# Patient Record
Sex: MALE | Race: WHITE | NOT HISPANIC OR LATINO | Employment: OTHER | URBAN - METROPOLITAN AREA
[De-identification: names, ages, dates, MRNs, and addresses within clinical notes are randomized per-mention and may not be internally consistent; named-entity substitution may affect disease eponyms.]

---

## 2017-02-20 ENCOUNTER — OFFICE VISIT (OUTPATIENT)
Dept: FAMILY MEDICINE CLINIC | Facility: CLINIC | Age: 62
End: 2017-02-20

## 2017-02-20 VITALS
HEART RATE: 73 BPM | SYSTOLIC BLOOD PRESSURE: 133 MMHG | RESPIRATION RATE: 16 BRPM | WEIGHT: 204 LBS | TEMPERATURE: 97.6 F | HEIGHT: 72 IN | DIASTOLIC BLOOD PRESSURE: 77 MMHG | BODY MASS INDEX: 27.63 KG/M2

## 2017-02-20 DIAGNOSIS — F32.5 MAJOR DEPRESSIVE DISORDER WITH SINGLE EPISODE, IN FULL REMISSION (HCC): ICD-10-CM

## 2017-02-20 DIAGNOSIS — E55.9 VITAMIN D DEFICIENCY: ICD-10-CM

## 2017-02-20 DIAGNOSIS — Z79.4 TYPE 2 DIABETES MELLITUS WITH MODERATE NONPROLIFERATIVE RETINOPATHY AND MACULAR EDEMA, WITH LONG-TERM CURRENT USE OF INSULIN, UNSPECIFIED LATERALITY (HCC): ICD-10-CM

## 2017-02-20 DIAGNOSIS — I10 ESSENTIAL HYPERTENSION: Primary | ICD-10-CM

## 2017-02-20 DIAGNOSIS — E78.49 OTHER HYPERLIPIDEMIA: ICD-10-CM

## 2017-02-20 DIAGNOSIS — E11.3319 TYPE 2 DIABETES MELLITUS WITH MODERATE NONPROLIFERATIVE RETINOPATHY AND MACULAR EDEMA, WITH LONG-TERM CURRENT USE OF INSULIN, UNSPECIFIED LATERALITY (HCC): ICD-10-CM

## 2017-02-20 PROCEDURE — 99214 OFFICE O/P EST MOD 30 MIN: CPT | Performed by: FAMILY MEDICINE

## 2017-02-20 RX ORDER — EZETIMIBE 10 MG/1
10 TABLET ORAL NIGHTLY
Qty: 30 TABLET | Refills: 5 | Status: SHIPPED | OUTPATIENT
Start: 2017-02-20 | End: 2017-08-21 | Stop reason: SDUPTHER

## 2017-02-20 RX ORDER — VALSARTAN 320 MG/1
320 TABLET ORAL DAILY
Qty: 30 TABLET | Refills: 5 | Status: SHIPPED | OUTPATIENT
Start: 2017-02-20 | End: 2017-08-21 | Stop reason: SDUPTHER

## 2017-02-20 RX ORDER — BUPROPION HYDROCHLORIDE 150 MG/1
150 TABLET ORAL DAILY
Qty: 30 TABLET | Refills: 5 | Status: SHIPPED | OUTPATIENT
Start: 2017-02-20 | End: 2017-08-21 | Stop reason: SDUPTHER

## 2017-02-20 RX ORDER — ATORVASTATIN CALCIUM 40 MG/1
40 TABLET, FILM COATED ORAL DAILY
Qty: 30 TABLET | Refills: 11 | Status: SHIPPED | OUTPATIENT
Start: 2017-02-20 | End: 2017-03-28

## 2017-02-20 NOTE — PROGRESS NOTES
"Chief Complaint   Patient presents with   • Hypertension   • Hyperlipidemia       Subjective   This patient returns the office for medicine refill.  His blood pressure is controlled on current therapy.  Side effect of constipation noted with irbesartan.  For that reason, we will switch to valsartan.  Depression and stress are not fully controlled with  bupropion.  Due to several home situations we will get referral to psychology.  He is overdue for seeing endocrinology and we will obtain labs and get a referral for that condition.  Labs are due.  Review of Systems   Constitutional: Positive for fatigue (exhausted).   Cardiovascular: Negative for chest pain.       Objective   Visit Vitals   • /77   • Pulse 73   • Temp 97.6 °F (36.4 °C) (Oral)   • Resp 16   • Ht 72\" (182.9 cm)   • Wt 204 lb (92.5 kg)   • BMI 27.67 kg/m2     Body mass index is 27.67 kg/(m^2).  Physical Exam   Constitutional: He is cooperative. No distress.   Eyes: Conjunctivae and lids are normal.   Neck: Carotid bruit is not present. No tracheal deviation present.   Cardiovascular: Normal rate, regular rhythm and normal heart sounds.    No murmur heard.  Pulmonary/Chest: Effort normal and breath sounds normal.   Neurological: He is alert. He is not disoriented.   Skin: Skin is warm and dry.   Psychiatric: He has a normal mood and affect. His speech is normal and behavior is normal. He expresses no suicidal ideation. He expresses no suicidal plans.   [facility]   Vitals reviewed.      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     • atorvastatin (LIPITOR) 40 MG tablet Take 1 tablet by mouth Daily. 30 tablet 11   • buPROPion XL (WELLBUTRIN XL) 150 MG 24 hr tablet Take 1 tablet by mouth Daily for 180 days. 30 tablet 5   • docusate sodium (COLACE) 100 MG capsule Take 1 capsule by mouth 2 (two) times a day. 30 capsule 0   • ezetimibe (ZETIA) 10 MG tablet Take 1 tablet by mouth Every Night for 180 days. 30 tablet 5   • insulin aspart (NOVOLOG FLEXPEN) 100 UNIT/ML solution pen-injector sc pen 15 units ac meal tid 15 mL 5   • insulin detemir (LEVEMIR FLEXPEN) 100 UNIT/ML injection 40 units once daily 15 mL 5   • JANUMET XR  MG tablet sustained-release 24 hour Take 2 tablets by mouth daily with dinner.     • RELION GLUCOSE TEST STRIPS test strip 1 each by Other route 2 (two) times a day. Use as instructed     • vitamin D (ERGOCALCIFEROL) 93491 UNITS capsule capsule Take 1 cap twice weekly 24 capsule 1   • [DISCONTINUED] atorvastatin (LIPITOR) 40 MG tablet Take 1 tablet by mouth daily. 30 tablet 11   • [DISCONTINUED] buPROPion XL (WELLBUTRIN XL) 150 MG 24 hr tablet Take 1 tablet by mouth Daily for 180 days. 30 tablet 5   • [DISCONTINUED] ezetimibe (ZETIA) 10 MG tablet Take 1 tablet by mouth Every Night for 180 days. 30 tablet 5   •  [DISCONTINUED] irbesartan (AVAPRO) 300 MG tablet Take 1 tablet by mouth Every Night for 180 days. 30 tablet 5   • valsartan (DIOVAN) 320 MG tablet Take 1 tablet by mouth Daily for 180 days. 30 tablet 5     No facility-administered encounter medications on file as of 2/20/2017.        Orders Placed This Encounter   Procedures   • Comprehensive metabolic panel   • Lipid Panel With LDL/HDL Ratio   • TSH   • Hemoglobin A1c   • MicroAlbumin, Urine, Random   • Vitamin D 25 Hydroxy   • Ambulatory Referral to Endocrinology     Referral Priority:   Routine     Referral Type:   Consultation     Referral Reason:   Specialty Services Required     Referred to Provider:   Corazon Dubois MD     Requested Specialty:   Endocrinology     Number of Visits Requested:   1   • Ambulatory Referral to Psychology     Referral Priority:   Routine     Referral Type:   Behavorial Health/Psych     Referral Reason:   Specialty Services Required     Requested Specialty:   Psychology     Number of Visits Requested:   1       Continue with current treatment plan.         F/U in 6 months

## 2017-03-23 ENCOUNTER — OFFICE VISIT (OUTPATIENT)
Dept: ENDOCRINOLOGY | Age: 62
End: 2017-03-23

## 2017-03-23 VITALS
WEIGHT: 201.4 LBS | DIASTOLIC BLOOD PRESSURE: 70 MMHG | SYSTOLIC BLOOD PRESSURE: 120 MMHG | HEIGHT: 72 IN | BODY MASS INDEX: 27.28 KG/M2

## 2017-03-23 DIAGNOSIS — N52.1 ERECTILE DYSFUNCTION DUE TO DISEASES CLASSIFIED ELSEWHERE: ICD-10-CM

## 2017-03-23 DIAGNOSIS — E78.5 HYPERLIPIDEMIA, UNSPECIFIED HYPERLIPIDEMIA TYPE: ICD-10-CM

## 2017-03-23 DIAGNOSIS — N18.9 CHRONIC KIDNEY DISEASE, UNSPECIFIED STAGE: ICD-10-CM

## 2017-03-23 DIAGNOSIS — E55.9 VITAMIN D DEFICIENCY: ICD-10-CM

## 2017-03-23 DIAGNOSIS — IMO0001 UNCONTROLLED DIABETES MELLITUS TYPE 2 WITHOUT COMPLICATIONS, UNSPECIFIED LONG TERM INSULIN USE STATUS: Primary | ICD-10-CM

## 2017-03-23 DIAGNOSIS — R53.82 CHRONIC FATIGUE: ICD-10-CM

## 2017-03-23 DIAGNOSIS — I10 ESSENTIAL HYPERTENSION: ICD-10-CM

## 2017-03-23 PROCEDURE — 99214 OFFICE O/P EST MOD 30 MIN: CPT | Performed by: NURSE PRACTITIONER

## 2017-03-23 RX ORDER — INSULIN GLARGINE 300 U/ML
40 INJECTION, SOLUTION SUBCUTANEOUS DAILY
Qty: 3 PEN | Refills: 5 | Status: SHIPPED | OUTPATIENT
Start: 2017-03-23 | End: 2017-12-12 | Stop reason: SDUPTHER

## 2017-03-23 RX ORDER — INSULIN GLULISINE 100 [IU]/ML
INJECTION, SOLUTION SUBCUTANEOUS
Qty: 15 ML | Refills: 5 | Status: SHIPPED | OUTPATIENT
Start: 2017-03-23 | End: 2017-12-12 | Stop reason: SDUPTHER

## 2017-03-23 NOTE — PROGRESS NOTES
"Roselyn Mo is a 61 y.o. male is being seen today at the request of García Rehman MD  Chief Complaint   Patient presents with   • Diabetes     no recent labs; didnt bring BG monitor; test BG 1-2XD   • Vitamin D Deficiency     PT STATES INSURANCE WILL NOT COVER LEVAMIR OR THE NOVALOG   • Hyperlipidemia   • Hypertension     /70  Ht 72\" (182.9 cm)  Wt 201 lb 6.4 oz (91.4 kg)  BMI 27.31 kg/m2  Current Outpatient Prescriptions on File Prior to Visit   Medication Sig   • aspirin  MG EC tablet Take 1 tablet by mouth daily.   • atorvastatin (LIPITOR) 40 MG tablet Take 1 tablet by mouth Daily.   • buPROPion XL (WELLBUTRIN XL) 150 MG 24 hr tablet Take 1 tablet by mouth Daily for 180 days.   • ezetimibe (ZETIA) 10 MG tablet Take 1 tablet by mouth Every Night for 180 days.   • JANUMET XR  MG tablet sustained-release 24 hour Take 2 tablets by mouth daily with dinner.   • RELION GLUCOSE TEST STRIPS test strip 1 each by Other route 2 (two) times a day. Use as instructed   • valsartan (DIOVAN) 320 MG tablet Take 1 tablet by mouth Daily for 180 days.   • vitamin D (ERGOCALCIFEROL) 79644 UNITS capsule capsule Take 1 cap twice weekly   • docusate sodium (COLACE) 100 MG capsule Take 1 capsule by mouth 2 (two) times a day.   • insulin aspart (NOVOLOG FLEXPEN) 100 UNIT/ML solution pen-injector sc pen 15 units ac meal tid   • insulin detemir (LEVEMIR FLEXPEN) 100 UNIT/ML injection 40 units once daily     No current facility-administered medications on file prior to visit.      Family History   Problem Relation Age of Onset   • Diabetes Mother    • Hypertension Mother    • Macular degeneration Mother    • Alcohol abuse Father    • Cancer Father      lung   • Alcohol abuse Brother      Social History   Substance Use Topics   • Smoking status: Never Smoker   • Smokeless tobacco: None   • Alcohol use No     Allergies   Allergen Reactions   • Prozac [Fluoxetine Hcl] Other (See Comments)     shaking "         History of Present Illness  Encounter Diagnoses   Name Primary?   • Uncontrolled diabetes mellitus type 2 without complications, unspecified long term insulin use status Yes   • Vitamin D deficiency    • Essential hypertension    • Hyperlipidemia, unspecified hyperlipidemia type    • Erectile dysfunction due to diseases classified elsewhere    • Chronic kidney disease, unspecified stage    • Chronic fatigue     this is a 61-year-old male patient here today for routine follow-up visit of the above-mentioned problems.  He states he has been out of his insulin since the summer because of insurance formulary changes.  He has been taking some old insulin.  He states his insurance will cover NovoLog or Levemir.  He states his blood sugar readings in the 2-300 range.  He did not bring a blood glucose meter in with him to today's visit.  He's had no significant hypoglycemic reaction or any reason which to go the emergency room.  He is taking his other medications as prescribed however he states he wants to come off the Janumet due to the bad taste of the medication.  He has not had any recent labs done.      The following portions of the patient's history were reviewed and updated as appropriate: allergies, current medications, past family history, past medical history, past social history, past surgical history and problem list.    Review of Systems   Constitutional: Positive for fatigue.   HENT: Negative for trouble swallowing.    Eyes: Negative for visual disturbance.   Respiratory: Negative for shortness of breath.    Cardiovascular: Negative for leg swelling.   Gastrointestinal: Negative for nausea.   Endocrine: Negative for polyphagia.   Genitourinary: Negative for urgency.   Musculoskeletal: Negative for joint swelling.   Skin: Negative for wound.   Allergic/Immunologic: Negative for immunocompromised state.   Neurological: Negative for numbness.   Hematological: Negative for adenopathy.    Psychiatric/Behavioral: The patient is not nervous/anxious.        Objective   Physical Exam   Constitutional: He is oriented to person, place, and time. He appears well-developed and well-nourished. No distress.   HENT:   Head: Normocephalic and atraumatic.   Right Ear: External ear normal.   Left Ear: External ear normal.   Nose: Nose normal.   Eyes: Pupils are equal, round, and reactive to light. Right eye exhibits no discharge. Left eye exhibits no discharge.   Neck: Normal range of motion. Neck supple. Carotid bruit is not present. No tracheal deviation, no edema and no erythema present. No thyromegaly present.   Cardiovascular: Normal rate, regular rhythm, normal heart sounds and intact distal pulses.  Exam reveals no gallop and no friction rub.    No murmur heard.  Pulmonary/Chest: Effort normal and breath sounds normal. No respiratory distress. He has no wheezes. He has no rales.   Abdominal: Soft. Bowel sounds are normal. He exhibits no distension. There is no tenderness.   Musculoskeletal: Normal range of motion. He exhibits no edema or deformity.   Lymphadenopathy:     He has no cervical adenopathy.   Neurological: He is alert and oriented to person, place, and time. Coordination normal.   Skin: Skin is warm and dry. No rash noted. He is not diaphoretic. No erythema. No pallor.   Has bedbug bites on both arms   Psychiatric: He has a normal mood and affect. His behavior is normal. Judgment and thought content normal.   Nursing note and vitals reviewed.    Admission on 08/27/2016, Discharged on 08/27/2016   Component Date Value Ref Range Status   • Glucose 08/27/2016 196* 65 - 99 mg/dL Final   • BUN 08/27/2016 23  8 - 23 mg/dL Final   • Creatinine 08/27/2016 1.18  0.76 - 1.27 mg/dL Final   • Sodium 08/27/2016 142  136 - 145 mmol/L Final   • Potassium 08/27/2016 4.3  3.5 - 5.2 mmol/L Final   • Chloride 08/27/2016 103  98 - 107 mmol/L Final   • CO2 08/27/2016 23.4  22.0 - 29.0 mmol/L Final   • Calcium  08/27/2016 9.5  8.6 - 10.5 mg/dL Final   • Total Protein 08/27/2016 6.9  6.0 - 8.5 g/dL Final   • Albumin 08/27/2016 4.10  3.50 - 5.20 g/dL Final   • ALT (SGPT) 08/27/2016 13  1 - 41 U/L Final   • AST (SGOT) 08/27/2016 17  1 - 40 U/L Final   • Alkaline Phosphatase 08/27/2016 58  39 - 117 U/L Final   • Total Bilirubin 08/27/2016 0.4  0.1 - 1.2 mg/dL Final   • eGFR Non African Amer 08/27/2016 63  >60 mL/min/1.73 Final    <15 Indicative of kidney failure.   • eGFR   Amer 08/27/2016   >60 mL/min/1.73 Final    <15 Indicative of kidney failure.   • Globulin 08/27/2016 2.8  gm/dL Final   • A/G Ratio 08/27/2016 1.5  g/dL Final   • BUN/Creatinine Ratio 08/27/2016 19.5  7.0 - 25.0 Final   • Anion Gap 08/27/2016 15.6  mmol/L Final   • Lipase 08/27/2016 30  13 - 60 U/L Final   • Color, UA 08/27/2016 Yellow  Yellow, Straw Final   • Appearance, UA 08/27/2016 Cloudy* Clear Final   • pH, UA 08/27/2016 8.0  5.0 - 8.0 Final   • Specific Gravity, UA 08/27/2016 1.024  1.005 - 1.030 Final   • Glucose, UA 08/27/2016 100 mg/dL (Trace)* Negative Final   • Ketones, UA 08/27/2016 40 mg/dL (2+)* Negative Final   • Bilirubin, UA 08/27/2016 Negative  Negative Final   • Blood, UA 08/27/2016 Negative  Negative Final   • Protein, UA 08/27/2016 100 mg/dL (2+)* Negative Final   • Leuk Esterase, UA 08/27/2016 Negative  Negative Final   • Nitrite, UA 08/27/2016 Negative  Negative Final   • Urobilinogen, UA 08/27/2016 0.2 E.U./dL  0.2 E.U./dL, 1.0 E.U./dL Final   • Extra Tube 08/27/2016 hold for add-on   Final    Auto resulted   • Extra Tube 08/27/2016 Hold for add-ons.   Final    Auto resulted.   • Extra Tube 08/27/2016 hold for add-on   Final    Auto resulted   • Extra Tube 08/27/2016 Hold for add-ons.   Final    Auto resulted.   • WBC 08/27/2016 9.66  4.50 - 10.70 10*3/mm3 Final   • RBC 08/27/2016 4.69  4.60 - 6.00 10*6/mm3 Final   • Hemoglobin 08/27/2016 12.1* 13.7 - 17.6 g/dL Final   • Hematocrit 08/27/2016 36.5* 40.4 - 52.2 % Final   • MCV  08/27/2016 77.8* 79.8 - 96.2 fL Final   • MCH 08/27/2016 25.8* 27.0 - 32.7 pg Final   • MCHC 08/27/2016 33.2  32.6 - 36.4 g/dL Final   • RDW 08/27/2016 13.5  11.5 - 14.5 % Final   • RDW-SD 08/27/2016 38.0  37.0 - 54.0 fl Final   • MPV 08/27/2016 10.0  6.0 - 12.0 fL Final   • Platelets 08/27/2016 276  140 - 500 10*3/mm3 Final   • Neutrophil % 08/27/2016 84.1* 42.7 - 76.0 % Final   • Lymphocyte % 08/27/2016 10.7* 19.6 - 45.3 % Final   • Monocyte % 08/27/2016 4.6* 5.0 - 12.0 % Final   • Eosinophil % 08/27/2016 0.3  0.3 - 6.2 % Final   • Basophil % 08/27/2016 0.1  0.0 - 1.5 % Final   • Immature Grans % 08/27/2016 0.2  0.0 - 0.5 % Final   • Neutrophils, Absolute 08/27/2016 8.13* 1.90 - 8.10 10*3/mm3 Final   • Lymphocytes, Absolute 08/27/2016 1.03  0.90 - 4.80 10*3/mm3 Final   • Monocytes, Absolute 08/27/2016 0.44  0.20 - 1.20 10*3/mm3 Final   • Eosinophils, Absolute 08/27/2016 0.03  0.00 - 0.70 10*3/mm3 Final   • Basophils, Absolute 08/27/2016 0.01  0.00 - 0.20 10*3/mm3 Final   • Immature Grans, Absolute 08/27/2016 0.02  0.00 - 0.03 10*3/mm3 Final   • RBC, UA 08/27/2016 0-2* None Seen /HPF Final   • WBC, UA 08/27/2016 0-2* None Seen /HPF Final   • Bacteria, UA 08/27/2016 None Seen  None Seen /HPF Final   • Squamous Epithelial Cells, UA 08/27/2016 0-2  None Seen, 0-2 /HPF Final   • Hyaline Casts, UA 08/27/2016 0-2  None Seen /LPF Final   • Methodology 08/27/2016 Automated Microscopy   Final           Assessment/Plan   Carlito was seen today for diabetes, vitamin d deficiency, hyperlipidemia and hypertension.    Diagnoses and all orders for this visit:    Uncontrolled diabetes mellitus type 2 without complications, unspecified long term insulin use status    Vitamin D deficiency    Essential hypertension    Hyperlipidemia, unspecified hyperlipidemia type    Erectile dysfunction due to diseases classified elsewhere    Chronic kidney disease, unspecified stage    Chronic fatigue      Summary, patient was seen and examined.  He  has not been taking his insulin as prescribed due to not having any due to insurance formulary changes.  His labs from last August were reviewed.  He will have extensive laboratory evaluation done today and will be notified of the results along with any further recommendations.  I've changed his insulins that they will be covered under his current formulary plan.  If they are not covered we can always do a prior authorization or changed to formulary.  I started him on kombglyze 2.5/1000 2 pills once daily.  He is to follow-up in 4 months with labs prior.  toujeo 40 units once daily to replace novolog  apidra to replace novolog 15 units prior to each meal  Continue all other meds  Monitor blood sugars closely and notify office if you have any low blood sugars         Current Outpatient Prescriptions:   •  APIDRA SOLOSTAR 100 UNIT/ML solution pen-injector, 15 units ac meals tid, Disp: 15 mL, Rfl: 5  •  aspirin  MG EC tablet, Take 1 tablet by mouth daily., Disp: , Rfl:   •  atorvastatin (LIPITOR) 40 MG tablet, Take 1 tablet by mouth Daily., Disp: 30 tablet, Rfl: 11  •  buPROPion XL (WELLBUTRIN XL) 150 MG 24 hr tablet, Take 1 tablet by mouth Daily for 180 days., Disp: 30 tablet, Rfl: 5  •  ezetimibe (ZETIA) 10 MG tablet, Take 1 tablet by mouth Every Night for 180 days., Disp: 30 tablet, Rfl: 5  •  RELION GLUCOSE TEST STRIPS test strip, 1 each by Other route 2 (two) times a day. Use as instructed, Disp: , Rfl:   •  TOUJEO SOLOSTAR 300 UNIT/ML solution pen-injector, Inject 40 Units under the skin Daily., Disp: 3 pen, Rfl: 5  •  valsartan (DIOVAN) 320 MG tablet, Take 1 tablet by mouth Daily for 180 days., Disp: 30 tablet, Rfl: 5  •  vitamin D (ERGOCALCIFEROL) 22505 UNITS capsule capsule, Take 1 cap twice weekly, Disp: 24 capsule, Rfl: 1  •  docusate sodium (COLACE) 100 MG capsule, Take 1 capsule by mouth 2 (two) times a day., Disp: 30 capsule, Rfl: 0  •  SAXagliptin-MetFORMIN ER 2.5-1000 MG tablet sustained-release  24 hour, Take 2 tablets by mouth Daily., Disp: 60 tablet, Rfl: 5

## 2017-03-23 NOTE — PATIENT INSTRUCTIONS
toujeo 40 units once daily to replace novolog  apidra to replace novolog 15 units prior to each meal  Continue all other meds  Monitor blood sugars closely and notify office if you have any low blood sugars

## 2017-03-25 LAB
25(OH)D3+25(OH)D2 SERPL-MCNC: 20.3 NG/ML (ref 30–100)
ALBUMIN SERPL-MCNC: 4.1 G/DL (ref 3.5–5.2)
ALBUMIN/GLOB SERPL: 1.6 G/DL
ALP SERPL-CCNC: 70 U/L (ref 39–117)
ALT SERPL-CCNC: 16 U/L (ref 1–41)
AST SERPL-CCNC: 16 U/L (ref 1–40)
BILIRUB SERPL-MCNC: 0.5 MG/DL (ref 0.1–1.2)
BUN SERPL-MCNC: 15 MG/DL (ref 8–23)
BUN/CREAT SERPL: 11.3 (ref 7–25)
C PEPTIDE SERPL-MCNC: 2.6 NG/ML (ref 1.1–4.4)
CALCIUM SERPL-MCNC: 9.8 MG/DL (ref 8.6–10.5)
CHLORIDE SERPL-SCNC: 98 MMOL/L (ref 98–107)
CHOLEST SERPL-MCNC: 257 MG/DL (ref 0–200)
CO2 SERPL-SCNC: 27.1 MMOL/L (ref 22–29)
CONV COMMENT: NORMAL
CREAT SERPL-MCNC: 1.33 MG/DL (ref 0.76–1.27)
FT4I SERPL CALC-MCNC: 2.1 (ref 1.2–4.9)
GLOBULIN SER CALC-MCNC: 2.5 GM/DL
GLUCOSE SERPL-MCNC: 308 MG/DL (ref 65–99)
HBA1C MFR BLD: 12.79 % (ref 4.8–5.6)
HDLC SERPL-MCNC: 46 MG/DL (ref 40–60)
LDLC SERPL CALC-MCNC: 172 MG/DL (ref 0–100)
MICROALBUMIN UR-MCNC: 1544.2 UG/ML
POTASSIUM SERPL-SCNC: 4.9 MMOL/L (ref 3.5–5.2)
PROT SERPL-MCNC: 6.6 G/DL (ref 6–8.5)
SHBG SERPL-SCNC: 67.2 NMOL/L (ref 19.3–76.4)
SODIUM SERPL-SCNC: 139 MMOL/L (ref 136–145)
T3FREE SERPL-MCNC: 2.9 PG/ML (ref 2–4.4)
T3RU NFR SERPL: 27 % (ref 24–39)
T4 FREE SERPL-MCNC: 1.28 NG/DL (ref 0.93–1.7)
T4 SERPL-MCNC: 7.7 UG/DL (ref 4.5–12)
TESTOST FREE SERPL-MCNC: 6.8 PG/ML (ref 6.6–18.1)
TESTOST SERPL-MCNC: 498 NG/DL (ref 348–1197)
TRIGL SERPL-MCNC: 197 MG/DL (ref 0–150)
TSH SERPL DL<=0.005 MIU/L-ACNC: 4.04 UIU/ML (ref 0.45–4.5)
VLDLC SERPL CALC-MCNC: 39.4 MG/DL (ref 5–40)

## 2017-03-28 ENCOUNTER — TELEPHONE (OUTPATIENT)
Dept: ENDOCRINOLOGY | Age: 62
End: 2017-03-28

## 2017-03-28 DIAGNOSIS — I10 ESSENTIAL HYPERTENSION: ICD-10-CM

## 2017-03-28 RX ORDER — ATORVASTATIN CALCIUM 80 MG/1
80 TABLET, FILM COATED ORAL DAILY
Qty: 30 TABLET | Refills: 5 | Status: SHIPPED | OUTPATIENT
Start: 2017-03-28 | End: 2018-03-28

## 2017-03-28 NOTE — TELEPHONE ENCOUNTER
CALLED PT AND INFORMED OF LAB RESULTS AND MEDS. PT STATED VERBAL UNDERSTANDING        ----- Message from JI Mello sent at 3/28/2017  1:02 PM EDT -----  Let pt know about labs

## 2017-07-20 DIAGNOSIS — E11.3399 TYPE 2 DIABETES MELLITUS WITH MODERATE NONPROLIFERATIVE RETINOPATHY WITHOUT MACULAR EDEMA, WITH LONG-TERM CURRENT USE OF INSULIN, UNSPECIFIED LATERALITY (HCC): Primary | ICD-10-CM

## 2017-07-20 DIAGNOSIS — E55.9 VITAMIN D DEFICIENCY: ICD-10-CM

## 2017-07-20 DIAGNOSIS — Z79.4 TYPE 2 DIABETES MELLITUS WITH MODERATE NONPROLIFERATIVE RETINOPATHY WITHOUT MACULAR EDEMA, WITH LONG-TERM CURRENT USE OF INSULIN, UNSPECIFIED LATERALITY (HCC): Primary | ICD-10-CM

## 2017-08-20 ENCOUNTER — RESULTS ENCOUNTER (OUTPATIENT)
Dept: ENDOCRINOLOGY | Age: 62
End: 2017-08-20

## 2017-08-20 DIAGNOSIS — E11.3399 TYPE 2 DIABETES MELLITUS WITH MODERATE NONPROLIFERATIVE RETINOPATHY WITHOUT MACULAR EDEMA, WITH LONG-TERM CURRENT USE OF INSULIN, UNSPECIFIED LATERALITY (HCC): ICD-10-CM

## 2017-08-20 DIAGNOSIS — E55.9 VITAMIN D DEFICIENCY: ICD-10-CM

## 2017-08-20 DIAGNOSIS — Z79.4 TYPE 2 DIABETES MELLITUS WITH MODERATE NONPROLIFERATIVE RETINOPATHY WITHOUT MACULAR EDEMA, WITH LONG-TERM CURRENT USE OF INSULIN, UNSPECIFIED LATERALITY (HCC): ICD-10-CM

## 2017-08-21 ENCOUNTER — OFFICE VISIT (OUTPATIENT)
Dept: FAMILY MEDICINE CLINIC | Facility: CLINIC | Age: 62
End: 2017-08-21

## 2017-08-21 VITALS
RESPIRATION RATE: 16 BRPM | DIASTOLIC BLOOD PRESSURE: 68 MMHG | BODY MASS INDEX: 25.73 KG/M2 | TEMPERATURE: 97 F | HEIGHT: 72 IN | SYSTOLIC BLOOD PRESSURE: 107 MMHG | WEIGHT: 190 LBS | HEART RATE: 81 BPM

## 2017-08-21 DIAGNOSIS — I10 ESSENTIAL HYPERTENSION: ICD-10-CM

## 2017-08-21 DIAGNOSIS — E78.49 OTHER HYPERLIPIDEMIA: ICD-10-CM

## 2017-08-21 DIAGNOSIS — I25.9 CHRONIC ISCHEMIC HEART DISEASE: ICD-10-CM

## 2017-08-21 DIAGNOSIS — F32.5 MAJOR DEPRESSIVE DISORDER WITH SINGLE EPISODE, IN FULL REMISSION (HCC): Primary | ICD-10-CM

## 2017-08-21 PROCEDURE — 99214 OFFICE O/P EST MOD 30 MIN: CPT | Performed by: FAMILY MEDICINE

## 2017-08-21 RX ORDER — BUPROPION HYDROCHLORIDE 150 MG/1
150 TABLET ORAL DAILY
Qty: 30 TABLET | Refills: 5 | Status: ON HOLD | OUTPATIENT
Start: 2017-08-21 | End: 2018-02-20

## 2017-08-21 RX ORDER — VALSARTAN 320 MG/1
320 TABLET ORAL DAILY
Qty: 30 TABLET | Refills: 5 | Status: SHIPPED | OUTPATIENT
Start: 2017-08-21 | End: 2018-01-18 | Stop reason: SDUPTHER

## 2017-08-21 RX ORDER — EZETIMIBE 10 MG/1
10 TABLET ORAL NIGHTLY
Qty: 30 TABLET | Refills: 5 | Status: SHIPPED | OUTPATIENT
Start: 2017-08-21 | End: 2017-12-06 | Stop reason: HOSPADM

## 2017-08-21 NOTE — PROGRESS NOTES
"Chief Complaint   Patient presents with   • Hypertension   • Hyperlipidemia       Subjective   This patient presents the office to refill medicines.  Her blood pressure is controlled.  No side effects from valsartan.  He continues to take bupropion for depression and that condition is stable.  He continues to take atorvastatin and Zetia for his hyperlipidemia and history of chronic ischemic heart disease.  That condition is stable.  He continues to suffer from slow transit constipation.  He uses MiraLAX for that condition.  Newer Medications are not on his formulary.  I have reviewed and updated his medications, medical history and problem list during today's office visit.        Social History   Substance Use Topics   • Smoking status: Never Smoker   • Smokeless tobacco: Never Used   • Alcohol use No       Review of Systems   Gastrointestinal: Positive for constipation.   Psychiatric/Behavioral: Negative for dysphoric mood.       Objective   /68  Pulse 81  Temp 97 °F (36.1 °C) (Oral)   Resp 16  Ht 72\" (182.9 cm)  Wt 190 lb (86.2 kg)  BMI 25.77 kg/m2  Body mass index is 25.77 kg/(m^2).  Physical Exam   Constitutional: He is cooperative. No distress.   Eyes: Conjunctivae and lids are normal.   Neck: Carotid bruit is not present. No tracheal deviation present.   Cardiovascular: Normal rate, regular rhythm and normal heart sounds.    No murmur heard.  Pulmonary/Chest: Effort normal and breath sounds normal.   Neurological: He is alert. He is not disoriented.   Skin: Skin is warm and dry.   Psychiatric: He has a normal mood and affect. His speech is normal and behavior is normal.   Vitals reviewed.      Data Reviewed:        Assessment/Plan     Problem List Items Addressed This Visit        Cardiovascular and Mediastinum    Chronic ischemic heart disease    Essential hypertension    Relevant Medications    valsartan (DIOVAN) 320 MG tablet    Hyperlipidemia    Relevant Medications    ezetimibe (ZETIA) 10 MG " tablet       Other    Depression - Primary    Relevant Medications    buPROPion XL (WELLBUTRIN XL) 150 MG 24 hr tablet          Outpatient Encounter Prescriptions as of 8/21/2017   Medication Sig Dispense Refill   • aspirin  MG EC tablet Take 1 tablet by mouth daily.     • atorvastatin (LIPITOR) 80 MG tablet Take 1 tablet by mouth Daily. 30 tablet 5   • docusate sodium (COLACE) 100 MG capsule Take 1 capsule by mouth 2 (two) times a day. 30 capsule 0   • RELION GLUCOSE TEST STRIPS test strip 1 each by Other route 2 (two) times a day. Use as instructed     • SAXagliptin-MetFORMIN ER 2.5-1000 MG tablet sustained-release 24 hour Take 2 tablets by mouth Daily. 60 tablet 5   • TOUJEO SOLOSTAR 300 UNIT/ML solution pen-injector Inject 40 Units under the skin Daily. 3 pen 5   • vitamin D (ERGOCALCIFEROL) 90823 UNITS capsule capsule Take 1 cap twice weekly 24 capsule 1   • APIDRA SOLOSTAR 100 UNIT/ML solution pen-injector 15 units ac meals tid 15 mL 5   • buPROPion XL (WELLBUTRIN XL) 150 MG 24 hr tablet Take 1 tablet by mouth Daily for 180 days. 30 tablet 5   • ezetimibe (ZETIA) 10 MG tablet Take 1 tablet by mouth Every Night for 180 days. 30 tablet 5   • valsartan (DIOVAN) 320 MG tablet Take 1 tablet by mouth Daily for 180 days. 30 tablet 5   • [DISCONTINUED] ezetimibe (ZETIA) 10 MG tablet Take 1 tablet by mouth Every Night for 180 days. 30 tablet 5   • [DISCONTINUED] valsartan (DIOVAN) 320 MG tablet Take 1 tablet by mouth Daily for 180 days. 30 tablet 5     No facility-administered encounter medications on file as of 8/21/2017.        No orders of the defined types were placed in this encounter.      Continue with current treatment plan.         F/U in 6 months

## 2017-11-30 ENCOUNTER — APPOINTMENT (OUTPATIENT)
Dept: CT IMAGING | Facility: HOSPITAL | Age: 62
End: 2017-11-30

## 2017-11-30 ENCOUNTER — APPOINTMENT (OUTPATIENT)
Dept: GENERAL RADIOLOGY | Facility: HOSPITAL | Age: 62
End: 2017-11-30

## 2017-11-30 ENCOUNTER — HOSPITAL ENCOUNTER (INPATIENT)
Facility: HOSPITAL | Age: 62
LOS: 6 days | Discharge: HOME HEALTH | End: 2017-12-06
Attending: EMERGENCY MEDICINE | Admitting: INTERNAL MEDICINE

## 2017-11-30 DIAGNOSIS — R11.11 VOMITING WITHOUT NAUSEA, INTRACTABILITY OF VOMITING NOT SPECIFIED, UNSPECIFIED VOMITING TYPE: ICD-10-CM

## 2017-11-30 DIAGNOSIS — R73.9 HYPERGLYCEMIA: ICD-10-CM

## 2017-11-30 DIAGNOSIS — R19.7 DIARRHEA, UNSPECIFIED TYPE: ICD-10-CM

## 2017-11-30 DIAGNOSIS — E87.20 LACTIC ACID ACIDOSIS: ICD-10-CM

## 2017-11-30 DIAGNOSIS — R41.82 ALTERED MENTAL STATUS, UNSPECIFIED ALTERED MENTAL STATUS TYPE: Primary | ICD-10-CM

## 2017-11-30 LAB
ALBUMIN SERPL-MCNC: 3.9 G/DL (ref 3.5–5.2)
ALBUMIN/GLOB SERPL: 1.2 G/DL
ALP SERPL-CCNC: 79 U/L (ref 39–117)
ALT SERPL W P-5'-P-CCNC: 12 U/L (ref 1–41)
AMPHET+METHAMPHET UR QL: NEGATIVE
ANION GAP SERPL CALCULATED.3IONS-SCNC: 20.3 MMOL/L
ARTERIAL PATENCY WRIST A: POSITIVE
AST SERPL-CCNC: 13 U/L (ref 1–40)
ATMOSPHERIC PRESS: 755.7 MMHG
BACTERIA UR QL AUTO: ABNORMAL /HPF
BARBITURATES UR QL SCN: NEGATIVE
BASE EXCESS BLDA CALC-SCNC: -6.7 MMOL/L (ref 0–2)
BASOPHILS # BLD AUTO: 0.01 10*3/MM3 (ref 0–0.2)
BASOPHILS NFR BLD AUTO: 0.1 % (ref 0–1.5)
BDY SITE: ABNORMAL
BENZODIAZ UR QL SCN: NEGATIVE
BILIRUB SERPL-MCNC: 0.4 MG/DL (ref 0.1–1.2)
BILIRUB UR QL STRIP: NEGATIVE
BUN BLD-MCNC: 22 MG/DL (ref 8–23)
BUN/CREAT SERPL: 18.3 (ref 7–25)
CALCIUM SPEC-SCNC: 9.6 MG/DL (ref 8.6–10.5)
CANNABINOIDS SERPL QL: NEGATIVE
CHLORIDE SERPL-SCNC: 93 MMOL/L (ref 98–107)
CK SERPL-CCNC: 80 U/L (ref 20–200)
CLARITY UR: CLEAR
CO2 SERPL-SCNC: 19.7 MMOL/L (ref 22–29)
COCAINE UR QL: NEGATIVE
COD CRY URNS QL: ABNORMAL /HPF
COLOR UR: YELLOW
CREAT BLD-MCNC: 1.2 MG/DL (ref 0.76–1.27)
D-LACTATE SERPL-SCNC: 3.4 MMOL/L (ref 0.5–2)
DEPRECATED RDW RBC AUTO: 37.8 FL (ref 37–54)
EOSINOPHIL # BLD AUTO: 0.15 10*3/MM3 (ref 0–0.7)
EOSINOPHIL NFR BLD AUTO: 1.8 % (ref 0.3–6.2)
ERYTHROCYTE [DISTWIDTH] IN BLOOD BY AUTOMATED COUNT: 13 % (ref 11.5–14.5)
ETHANOL BLD-MCNC: <10 MG/DL (ref 0–10)
ETHANOL UR QL: <0.01 %
GFR SERPL CREATININE-BSD FRML MDRD: 61 ML/MIN/1.73
GLOBULIN UR ELPH-MCNC: 3.3 GM/DL
GLUCOSE BLD-MCNC: 329 MG/DL (ref 65–99)
GLUCOSE UR STRIP-MCNC: NEGATIVE MG/DL
HCO3 BLDA-SCNC: 17.3 MMOL/L (ref 22–28)
HCT VFR BLD AUTO: 40.8 % (ref 40.4–52.2)
HGB BLD-MCNC: 13.6 G/DL (ref 13.7–17.6)
HGB UR QL STRIP.AUTO: ABNORMAL
HYALINE CASTS UR QL AUTO: ABNORMAL /LPF
IMM GRANULOCYTES # BLD: 0.02 10*3/MM3 (ref 0–0.03)
IMM GRANULOCYTES NFR BLD: 0.2 % (ref 0–0.5)
INR PPP: 0.98 (ref 0.9–1.1)
KETONES UR QL STRIP: ABNORMAL
LEUKOCYTE ESTERASE UR QL STRIP.AUTO: NEGATIVE
LIPASE SERPL-CCNC: 58 U/L (ref 13–60)
LYMPHOCYTES # BLD AUTO: 2.4 10*3/MM3 (ref 0.9–4.8)
LYMPHOCYTES NFR BLD AUTO: 28.5 % (ref 19.6–45.3)
MAGNESIUM SERPL-MCNC: 1.7 MG/DL (ref 1.6–2.4)
MCH RBC QN AUTO: 26.6 PG (ref 27–32.7)
MCHC RBC AUTO-ENTMCNC: 33.3 G/DL (ref 32.6–36.4)
MCV RBC AUTO: 79.8 FL (ref 79.8–96.2)
METHADONE UR QL SCN: NEGATIVE
MODALITY: ABNORMAL
MONOCYTES # BLD AUTO: 0.49 10*3/MM3 (ref 0.2–1.2)
MONOCYTES NFR BLD AUTO: 5.8 % (ref 5–12)
MUCOUS THREADS URNS QL MICRO: ABNORMAL /HPF
NEUTROPHILS # BLD AUTO: 5.36 10*3/MM3 (ref 1.9–8.1)
NEUTROPHILS NFR BLD AUTO: 63.6 % (ref 42.7–76)
NITRITE UR QL STRIP: NEGATIVE
OPIATES UR QL: NEGATIVE
OXYCODONE UR QL SCN: NEGATIVE
PCO2 BLDA: 29.4 MM HG (ref 35–45)
PH BLDA: 7.38 PH UNITS (ref 7.35–7.45)
PH UR STRIP.AUTO: 5.5 [PH] (ref 5–8)
PLATELET # BLD AUTO: 256 10*3/MM3 (ref 140–500)
PMV BLD AUTO: 11 FL (ref 6–12)
PO2 BLDA: 102 MM HG (ref 80–100)
POTASSIUM BLD-SCNC: 3.2 MMOL/L (ref 3.5–5.2)
PROT SERPL-MCNC: 7.2 G/DL (ref 6–8.5)
PROT UR QL STRIP: NEGATIVE
PROTHROMBIN TIME: 12.6 SECONDS (ref 11.7–14.2)
RBC # BLD AUTO: 5.11 10*6/MM3 (ref 4.6–6)
RBC # UR: ABNORMAL /HPF
REF LAB TEST METHOD: ABNORMAL
SAO2 % BLDCOA: 97.9 % (ref 92–99)
SODIUM BLD-SCNC: 133 MMOL/L (ref 136–145)
SP GR UR STRIP: 1.02 (ref 1–1.03)
SQUAMOUS #/AREA URNS HPF: ABNORMAL /HPF
TOTAL RATE: 25 BREATHS/MINUTE
TROPONIN T SERPL-MCNC: 0.01 NG/ML (ref 0–0.03)
UROBILINOGEN UR QL STRIP: ABNORMAL
WBC NRBC COR # BLD: 8.43 10*3/MM3 (ref 4.5–10.7)
WBC UR QL AUTO: ABNORMAL /HPF

## 2017-11-30 PROCEDURE — 80053 COMPREHEN METABOLIC PANEL: CPT | Performed by: EMERGENCY MEDICINE

## 2017-11-30 PROCEDURE — 85610 PROTHROMBIN TIME: CPT | Performed by: EMERGENCY MEDICINE

## 2017-11-30 PROCEDURE — 25010000002 ONDANSETRON PER 1 MG: Performed by: EMERGENCY MEDICINE

## 2017-11-30 PROCEDURE — 81001 URINALYSIS AUTO W/SCOPE: CPT | Performed by: EMERGENCY MEDICINE

## 2017-11-30 PROCEDURE — 80307 DRUG TEST PRSMV CHEM ANLYZR: CPT | Performed by: EMERGENCY MEDICINE

## 2017-11-30 PROCEDURE — 70450 CT HEAD/BRAIN W/O DYE: CPT

## 2017-11-30 PROCEDURE — 93010 ELECTROCARDIOGRAM REPORT: CPT | Performed by: INTERNAL MEDICINE

## 2017-11-30 PROCEDURE — 25010000002 CEFEPIME PER 500 MG: Performed by: EMERGENCY MEDICINE

## 2017-11-30 PROCEDURE — 82565 ASSAY OF CREATININE: CPT

## 2017-11-30 PROCEDURE — 25010000002 LORAZEPAM PER 2 MG

## 2017-11-30 PROCEDURE — 82803 BLOOD GASES ANY COMBINATION: CPT

## 2017-11-30 PROCEDURE — 51702 INSERT TEMP BLADDER CATH: CPT

## 2017-11-30 PROCEDURE — 83735 ASSAY OF MAGNESIUM: CPT | Performed by: EMERGENCY MEDICINE

## 2017-11-30 PROCEDURE — 93005 ELECTROCARDIOGRAM TRACING: CPT | Performed by: EMERGENCY MEDICINE

## 2017-11-30 PROCEDURE — 82550 ASSAY OF CK (CPK): CPT | Performed by: EMERGENCY MEDICINE

## 2017-11-30 PROCEDURE — 36415 COLL VENOUS BLD VENIPUNCTURE: CPT

## 2017-11-30 PROCEDURE — 83690 ASSAY OF LIPASE: CPT | Performed by: EMERGENCY MEDICINE

## 2017-11-30 PROCEDURE — 70496 CT ANGIOGRAPHY HEAD: CPT

## 2017-11-30 PROCEDURE — 85025 COMPLETE CBC W/AUTO DIFF WBC: CPT | Performed by: EMERGENCY MEDICINE

## 2017-11-30 PROCEDURE — 0 IOPAMIDOL PER 1 ML: Performed by: EMERGENCY MEDICINE

## 2017-11-30 PROCEDURE — 25010000002 HALOPERIDOL LACTATE PER 5 MG: Performed by: INTERNAL MEDICINE

## 2017-11-30 PROCEDURE — 87040 BLOOD CULTURE FOR BACTERIA: CPT | Performed by: EMERGENCY MEDICINE

## 2017-11-30 PROCEDURE — 83605 ASSAY OF LACTIC ACID: CPT | Performed by: EMERGENCY MEDICINE

## 2017-11-30 PROCEDURE — 70498 CT ANGIOGRAPHY NECK: CPT

## 2017-11-30 PROCEDURE — 25010000002 VANCOMYCIN 10 G RECONSTITUTED SOLUTION: Performed by: EMERGENCY MEDICINE

## 2017-11-30 PROCEDURE — 25010000003 POTASSIUM CHLORIDE 10 MEQ/100ML SOLUTION: Performed by: EMERGENCY MEDICINE

## 2017-11-30 PROCEDURE — 36600 WITHDRAWAL OF ARTERIAL BLOOD: CPT

## 2017-11-30 PROCEDURE — 71010 HC CHEST PA OR AP: CPT

## 2017-11-30 PROCEDURE — 99291 CRITICAL CARE FIRST HOUR: CPT

## 2017-11-30 PROCEDURE — 84484 ASSAY OF TROPONIN QUANT: CPT | Performed by: EMERGENCY MEDICINE

## 2017-11-30 PROCEDURE — 84145 PROCALCITONIN (PCT): CPT | Performed by: INTERNAL MEDICINE

## 2017-11-30 PROCEDURE — 25010000002 LORAZEPAM PER 2 MG: Performed by: EMERGENCY MEDICINE

## 2017-11-30 RX ORDER — LORAZEPAM 2 MG/ML
1 INJECTION INTRAMUSCULAR ONCE
Status: COMPLETED | OUTPATIENT
Start: 2017-11-30 | End: 2017-11-30

## 2017-11-30 RX ORDER — HALOPERIDOL 5 MG/ML
10 INJECTION INTRAMUSCULAR ONCE
Status: COMPLETED | OUTPATIENT
Start: 2017-11-30 | End: 2017-11-30

## 2017-11-30 RX ORDER — SODIUM CHLORIDE 0.9 % (FLUSH) 0.9 %
1-10 SYRINGE (ML) INJECTION AS NEEDED
Status: DISCONTINUED | OUTPATIENT
Start: 2017-11-30 | End: 2017-12-06 | Stop reason: HOSPADM

## 2017-11-30 RX ORDER — ONDANSETRON 2 MG/ML
4 INJECTION INTRAMUSCULAR; INTRAVENOUS ONCE
Status: COMPLETED | OUTPATIENT
Start: 2017-11-30 | End: 2017-11-30

## 2017-11-30 RX ORDER — HEPARIN SODIUM 5000 [USP'U]/ML
5000 INJECTION, SOLUTION INTRAVENOUS; SUBCUTANEOUS EVERY 8 HOURS SCHEDULED
Status: DISCONTINUED | OUTPATIENT
Start: 2017-11-30 | End: 2017-12-01

## 2017-11-30 RX ORDER — DEXMEDETOMIDINE HYDROCHLORIDE 4 UG/ML
.2-1.5 INJECTION, SOLUTION INTRAVENOUS
Status: DISCONTINUED | OUTPATIENT
Start: 2017-11-30 | End: 2017-12-02

## 2017-11-30 RX ORDER — LORAZEPAM 2 MG/ML
INJECTION INTRAMUSCULAR
Status: COMPLETED
Start: 2017-11-30 | End: 2017-11-30

## 2017-11-30 RX ORDER — POTASSIUM CHLORIDE 7.45 MG/ML
10 INJECTION INTRAVENOUS ONCE
Status: COMPLETED | OUTPATIENT
Start: 2017-11-30 | End: 2017-12-01

## 2017-11-30 RX ADMIN — DEXMEDETOMIDINE HYDROCHLORIDE 0.2 MCG/KG/HR: 4 INJECTION, SOLUTION INTRAVENOUS at 23:39

## 2017-11-30 RX ADMIN — VANCOMYCIN HYDROCHLORIDE 1750 MG: 10 INJECTION, POWDER, LYOPHILIZED, FOR SOLUTION INTRAVENOUS at 23:23

## 2017-11-30 RX ADMIN — SODIUM CHLORIDE 2586 ML: 9 INJECTION, SOLUTION INTRAVENOUS at 23:00

## 2017-11-30 RX ADMIN — LORAZEPAM 1 MG: 2 INJECTION INTRAMUSCULAR; INTRAVENOUS at 23:21

## 2017-11-30 RX ADMIN — LORAZEPAM 1 MG: 2 INJECTION INTRAMUSCULAR; INTRAVENOUS at 23:01

## 2017-11-30 RX ADMIN — POTASSIUM CHLORIDE 10 MEQ: 10 INJECTION, SOLUTION INTRAVENOUS at 23:10

## 2017-11-30 RX ADMIN — WATER 2 G: 1 INJECTION INTRAMUSCULAR; INTRAVENOUS; SUBCUTANEOUS at 23:51

## 2017-11-30 RX ADMIN — LORAZEPAM 1 MG: 2 INJECTION, SOLUTION INTRAMUSCULAR; INTRAVENOUS at 22:00

## 2017-11-30 RX ADMIN — LORAZEPAM 1 MG: 2 INJECTION INTRAMUSCULAR at 22:00

## 2017-11-30 RX ADMIN — IOPAMIDOL 95 ML: 755 INJECTION, SOLUTION INTRAVENOUS at 22:35

## 2017-11-30 RX ADMIN — ONDANSETRON 4 MG: 2 INJECTION INTRAMUSCULAR; INTRAVENOUS at 22:24

## 2017-11-30 RX ADMIN — HALOPERIDOL LACTATE 10 MG: 5 INJECTION, SOLUTION INTRAMUSCULAR at 23:37

## 2017-11-30 RX ADMIN — SODIUM CHLORIDE 1000 ML: 9 INJECTION, SOLUTION INTRAVENOUS at 21:46

## 2017-12-01 ENCOUNTER — APPOINTMENT (OUTPATIENT)
Dept: CT IMAGING | Facility: HOSPITAL | Age: 62
End: 2017-12-01

## 2017-12-01 LAB
AMMONIA BLD-SCNC: 16 UMOL/L (ref 16–60)
ANION GAP SERPL CALCULATED.3IONS-SCNC: 13.8 MMOL/L
APPEARANCE CSF: CLEAR
BASOPHILS # BLD AUTO: 0 10*3/MM3 (ref 0–0.2)
BASOPHILS NFR BLD AUTO: 0 % (ref 0–1.5)
BUN BLD-MCNC: 19 MG/DL (ref 8–23)
BUN/CREAT SERPL: 20 (ref 7–25)
CALCIUM SPEC-SCNC: 8.2 MG/DL (ref 8.6–10.5)
CHLORIDE SERPL-SCNC: 97 MMOL/L (ref 98–107)
CO2 SERPL-SCNC: 23.2 MMOL/L (ref 22–29)
COLOR CSF: COLORLESS
COLOR SPUN CSF: COLORLESS
CREAT BLD-MCNC: 0.95 MG/DL (ref 0.76–1.27)
CRYPTOC AG TITR CSF: NEGATIVE {TITER}
D-LACTATE SERPL-SCNC: 1.7 MMOL/L (ref 0.5–2)
DEPRECATED RDW RBC AUTO: 38.4 FL (ref 37–54)
EOSINOPHIL # BLD AUTO: 0.01 10*3/MM3 (ref 0–0.7)
EOSINOPHIL NFR BLD AUTO: 0.1 % (ref 0.3–6.2)
ERYTHROCYTE [DISTWIDTH] IN BLOOD BY AUTOMATED COUNT: 12.9 % (ref 11.5–14.5)
GFR SERPL CREATININE-BSD FRML MDRD: 80 ML/MIN/1.73
GLUCOSE BLD-MCNC: 338 MG/DL (ref 65–99)
GLUCOSE BLDC GLUCOMTR-MCNC: 115 MG/DL (ref 70–130)
GLUCOSE BLDC GLUCOMTR-MCNC: 117 MG/DL (ref 70–130)
GLUCOSE BLDC GLUCOMTR-MCNC: 132 MG/DL (ref 70–130)
GLUCOSE BLDC GLUCOMTR-MCNC: 137 MG/DL (ref 70–130)
GLUCOSE BLDC GLUCOMTR-MCNC: 301 MG/DL (ref 70–130)
GLUCOSE BLDC GLUCOMTR-MCNC: 311 MG/DL (ref 70–130)
GLUCOSE BLDC GLUCOMTR-MCNC: 332 MG/DL (ref 70–130)
GLUCOSE BLDC GLUCOMTR-MCNC: 53 MG/DL (ref 70–130)
GLUCOSE CSF-MCNC: 112 MG/DL (ref 40–70)
HCT VFR BLD AUTO: 35 % (ref 40.4–52.2)
HGB BLD-MCNC: 11.3 G/DL (ref 13.7–17.6)
HOLD SPECIMEN: NORMAL
IMM GRANULOCYTES # BLD: 0.03 10*3/MM3 (ref 0–0.03)
IMM GRANULOCYTES NFR BLD: 0.2 % (ref 0–0.5)
LYMPHOCYTES # BLD AUTO: 0.66 10*3/MM3 (ref 0.9–4.8)
LYMPHOCYTES NFR BLD AUTO: 5.3 % (ref 19.6–45.3)
MCH RBC QN AUTO: 26.3 PG (ref 27–32.7)
MCHC RBC AUTO-ENTMCNC: 32.3 G/DL (ref 32.6–36.4)
MCV RBC AUTO: 81.6 FL (ref 79.8–96.2)
METHOD: ABNORMAL
MONOCYTES # BLD AUTO: 0.4 10*3/MM3 (ref 0.2–1.2)
MONOCYTES NFR BLD AUTO: 3.2 % (ref 5–12)
NEUTROPHILS # BLD AUTO: 11.27 10*3/MM3 (ref 1.9–8.1)
NEUTROPHILS NFR BLD AUTO: 91.2 % (ref 42.7–76)
NUC CELL # CSF MANUAL: 0 /MM3 (ref 0–5)
PLATELET # BLD AUTO: 213 10*3/MM3 (ref 140–500)
PMV BLD AUTO: 11.3 FL (ref 6–12)
POTASSIUM BLD-SCNC: 4.1 MMOL/L (ref 3.5–5.2)
PROCALCITONIN SERPL-MCNC: 0.07 NG/ML (ref 0.1–0.25)
PROT CSF-MCNC: 66 MG/DL (ref 15–45)
RBC # BLD AUTO: 4.29 10*6/MM3 (ref 4.6–6)
RBC # CSF MANUAL: 2 /MM3 (ref 0–0)
SODIUM BLD-SCNC: 134 MMOL/L (ref 136–145)
SPECIMEN VOL CSF: 2 ML
TUBE # CSF: 3
WBC NRBC COR # BLD: 12.37 10*3/MM3 (ref 4.5–10.7)

## 2017-12-01 PROCEDURE — 83605 ASSAY OF LACTIC ACID: CPT | Performed by: EMERGENCY MEDICINE

## 2017-12-01 PROCEDURE — 0 IOPAMIDOL 61 % SOLUTION: Performed by: EMERGENCY MEDICINE

## 2017-12-01 PROCEDURE — 84157 ASSAY OF PROTEIN OTHER: CPT | Performed by: PSYCHIATRY & NEUROLOGY

## 2017-12-01 PROCEDURE — 85025 COMPLETE CBC W/AUTO DIFF WBC: CPT | Performed by: INTERNAL MEDICINE

## 2017-12-01 PROCEDURE — 87205 SMEAR GRAM STAIN: CPT | Performed by: PSYCHIATRY & NEUROLOGY

## 2017-12-01 PROCEDURE — 87070 CULTURE OTHR SPECIMN AEROBIC: CPT | Performed by: PSYCHIATRY & NEUROLOGY

## 2017-12-01 PROCEDURE — 83916 OLIGOCLONAL BANDS: CPT | Performed by: PSYCHIATRY & NEUROLOGY

## 2017-12-01 PROCEDURE — 89050 BODY FLUID CELL COUNT: CPT | Performed by: PSYCHIATRY & NEUROLOGY

## 2017-12-01 PROCEDURE — 009U3ZX DRAINAGE OF SPINAL CANAL, PERCUTANEOUS APPROACH, DIAGNOSTIC: ICD-10-PCS | Performed by: PSYCHIATRY & NEUROLOGY

## 2017-12-01 PROCEDURE — 63710000001 INSULIN ASPART PER 5 UNITS: Performed by: INTERNAL MEDICINE

## 2017-12-01 PROCEDURE — 87040 BLOOD CULTURE FOR BACTERIA: CPT | Performed by: EMERGENCY MEDICINE

## 2017-12-01 PROCEDURE — 86592 SYPHILIS TEST NON-TREP QUAL: CPT | Performed by: PSYCHIATRY & NEUROLOGY

## 2017-12-01 PROCEDURE — 87327 CRYPTOCOCCUS NEOFORM AG IA: CPT | Performed by: PSYCHIATRY & NEUROLOGY

## 2017-12-01 PROCEDURE — 80048 BASIC METABOLIC PNL TOTAL CA: CPT | Performed by: INTERNAL MEDICINE

## 2017-12-01 PROCEDURE — 74177 CT ABD & PELVIS W/CONTRAST: CPT

## 2017-12-01 PROCEDURE — 88108 CYTOPATH CONCENTRATE TECH: CPT | Performed by: PSYCHIATRY & NEUROLOGY

## 2017-12-01 PROCEDURE — 87015 SPECIMEN INFECT AGNT CONCNTJ: CPT | Performed by: PSYCHIATRY & NEUROLOGY

## 2017-12-01 PROCEDURE — 82040 ASSAY OF SERUM ALBUMIN: CPT | Performed by: PSYCHIATRY & NEUROLOGY

## 2017-12-01 PROCEDURE — 25010000002 HEPARIN (PORCINE) PER 1000 UNITS: Performed by: PSYCHIATRY & NEUROLOGY

## 2017-12-01 PROCEDURE — 82042 OTHER SOURCE ALBUMIN QUAN EA: CPT | Performed by: PSYCHIATRY & NEUROLOGY

## 2017-12-01 PROCEDURE — 82962 GLUCOSE BLOOD TEST: CPT

## 2017-12-01 PROCEDURE — 82140 ASSAY OF AMMONIA: CPT | Performed by: INTERNAL MEDICINE

## 2017-12-01 PROCEDURE — 25010000002 HEPARIN (PORCINE) PER 1000 UNITS: Performed by: INTERNAL MEDICINE

## 2017-12-01 PROCEDURE — 99254 IP/OBS CNSLTJ NEW/EST MOD 60: CPT | Performed by: PSYCHIATRY & NEUROLOGY

## 2017-12-01 PROCEDURE — 82945 GLUCOSE OTHER FLUID: CPT | Performed by: PSYCHIATRY & NEUROLOGY

## 2017-12-01 PROCEDURE — 25010000002 ACYCLOVIR PER 5 MG: Performed by: INTERNAL MEDICINE

## 2017-12-01 PROCEDURE — 63710000001 INSULIN DETEMER PER 5 UNITS: Performed by: INTERNAL MEDICINE

## 2017-12-01 PROCEDURE — 62270 DX LMBR SPI PNXR: CPT | Performed by: PSYCHIATRY & NEUROLOGY

## 2017-12-01 RX ORDER — POTASSIUM CHLORIDE 7.45 MG/ML
10 INJECTION INTRAVENOUS
Status: DISCONTINUED | OUTPATIENT
Start: 2017-12-01 | End: 2017-12-06 | Stop reason: HOSPADM

## 2017-12-01 RX ORDER — DEXTROSE MONOHYDRATE 25 G/50ML
50 INJECTION, SOLUTION INTRAVENOUS
Status: DISCONTINUED | OUTPATIENT
Start: 2017-12-01 | End: 2017-12-06 | Stop reason: HOSPADM

## 2017-12-01 RX ORDER — HEPARIN SODIUM 5000 [USP'U]/ML
5000 INJECTION, SOLUTION INTRAVENOUS; SUBCUTANEOUS EVERY 8 HOURS SCHEDULED
Status: DISCONTINUED | OUTPATIENT
Start: 2017-12-01 | End: 2017-12-06 | Stop reason: HOSPADM

## 2017-12-01 RX ORDER — SODIUM CHLORIDE 9 MG/ML
100 INJECTION, SOLUTION INTRAVENOUS CONTINUOUS
Status: DISCONTINUED | OUTPATIENT
Start: 2017-12-01 | End: 2017-12-02

## 2017-12-01 RX ORDER — DEXTROSE MONOHYDRATE 25 G/50ML
INJECTION, SOLUTION INTRAVENOUS
Status: DISPENSED
Start: 2017-12-01 | End: 2017-12-02

## 2017-12-01 RX ADMIN — INSULIN ASPART 7 UNITS: 100 INJECTION, SOLUTION INTRAVENOUS; SUBCUTANEOUS at 08:21

## 2017-12-01 RX ADMIN — HEPARIN SODIUM 5000 UNITS: 5000 INJECTION, SOLUTION INTRAVENOUS; SUBCUTANEOUS at 23:23

## 2017-12-01 RX ADMIN — ACYCLOVIR SODIUM 800 MG: 1000 INJECTION, SOLUTION INTRAVENOUS at 06:14

## 2017-12-01 RX ADMIN — DEXTROSE MONOHYDRATE 50 ML: 25 INJECTION, SOLUTION INTRAVENOUS at 22:11

## 2017-12-01 RX ADMIN — DEXMEDETOMIDINE HYDROCHLORIDE 0.2 MCG/KG/HR: 4 INJECTION, SOLUTION INTRAVENOUS at 18:41

## 2017-12-01 RX ADMIN — IOPAMIDOL 75 ML: 612 INJECTION, SOLUTION INTRAVENOUS at 01:22

## 2017-12-01 RX ADMIN — HEPARIN SODIUM 5000 UNITS: 5000 INJECTION, SOLUTION INTRAVENOUS; SUBCUTANEOUS at 11:34

## 2017-12-01 RX ADMIN — INSULIN ASPART 7 UNITS: 100 INJECTION, SOLUTION INTRAVENOUS; SUBCUTANEOUS at 18:21

## 2017-12-01 RX ADMIN — INSULIN DETEMIR 30 UNITS: 100 INJECTION, SOLUTION SUBCUTANEOUS at 03:43

## 2017-12-01 RX ADMIN — ACYCLOVIR SODIUM 800 MG: 1000 INJECTION, SOLUTION INTRAVENOUS at 14:17

## 2017-12-01 RX ADMIN — DEXMEDETOMIDINE HYDROCHLORIDE 0.5 MCG/KG/HR: 4 INJECTION, SOLUTION INTRAVENOUS at 07:28

## 2017-12-01 RX ADMIN — INSULIN DETEMIR 30 UNITS: 100 INJECTION, SOLUTION SUBCUTANEOUS at 20:00

## 2017-12-01 RX ADMIN — HEPARIN SODIUM 5000 UNITS: 5000 INJECTION, SOLUTION INTRAVENOUS; SUBCUTANEOUS at 03:40

## 2017-12-01 RX ADMIN — ACYCLOVIR SODIUM 800 MG: 1000 INJECTION, SOLUTION INTRAVENOUS at 22:10

## 2017-12-01 NOTE — ED TRIAGE NOTES
EMS reports fever and chills since yesterday.     Today, pt has had episodes of vomiting, confusion, spa, right sided weakness, headache, hypertension, near syncope, hyperglycemia.     Pt rearended a car today due to symptoms.

## 2017-12-01 NOTE — PAYOR COMM NOTE
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 mild, 0-49%.       IMPRESSION CERVICAL CAROTID CT ANGIOGRAM:      1. Bilateral bifurcation region plaque without significant stenosis                         CRANIAL CTA ANGIOGRAM:       FINDINGS:  The bilateral vertebral arteries are patent and unremarkable.   Basilar artery is widely patent and unremarkable. The basilar artery   gives rise to both posterior cerebral arteries. There are ample and   widely patent posterior communicating arteries present.       Moderate calcified plaque is present along the cavernous segments of   both internal carotid arteries without significant focal narrowing. The   bifurcations are normal. A1 segments normal bilaterally. There is a   small, patent anterior communicating artery present. M1 segments and   bilateral trifurcations are unremarkable. The peripheral branch vessels   are unremarkable.        .             IMPRESSION CRANIAL CT ANGIOGRAM:     1. No significant stenosis or aneurysm identified.               This report was finalized on 11/30/2017 10:58 PM by Eliseo Haro MD.          CT Head Without Contrast   Final Result   1. No acute intracranial abnormality.                           This study was performed with techniques to keep radiation doses as low   as reasonably achievable (ALARA).  Individualized dose reduction   techniques using automated exposure control or adjustment of mA and/or   kV according to the patient size were employed.        This report was finalized on 11/30/2017 10:19 PM by Eliseo Haro MD.          CT Abdomen Pelvis With Contrast    (Results Pending)        I ordered the above noted radiological studies. Interpreted by radiologist. Reviewed by me in PACS.       PROCEDURES  Critical Care  Performed by: BELTRAN HERBERT  Authorized by: BELTRAN HERBERT     Critical care provider statement:     Critical care time (minutes):  45    Critical care time was exclusive of:  Separately billable procedures and treating other patients    Critical care was necessary to treat or prevent imminent or life-threatening deterioration of the following conditions:  CNS failure or compromise and metabolic crisis    Critical care was time spent personally by me on the following activities:  Blood draw for specimens, development of treatment plan with patient or surrogate, discussions with consultants, evaluation of patient's response to treatment, examination of patient, interpretation of cardiac output measurements, obtaining history from patient or surrogate, ordering and performing treatments and interventions, ordering and review of laboratory studies, ordering and review of radiographic studies, re-evaluation of patient's condition and review of old charts               EKG           EKG time: 2354  Rhythm/Rate: NSR, 98 BPM  QRS, axis: LAD, RBBB  ST and T waves: diffuse T wave changes     Interpreted Contemporaneously by me, independently viewed  Similar compared to prior 02/2016        PROGRESS AND CONSULTS  ED Course     2151 Ordered blood work including CBC, blood culture, Urine Drug Screen, Ethanol, Lactic acid, Troponin, and POC glucose, EKG, XR chest, and CT head for further evaluation. Ordered IVF bolus for hydration and Zofran for nausea.     2211 Ordered Cardene and Ativan for sedation  for Ctscan.    2212 Discussed pt with Pritesh (stroke neurologist), who recommended to do a CTA head/neck for further evaluation.     2221 Ordered CTA head/neck for further evaluation.     2238 Ordered Vancomycin and Maxipine as elevated lactic acid. He has no fever. Normal WBC. Uncertain of infection at this time. His xray is suggestive atlextasis in bases and with all the vomiting possible aspiration pneumonia.      2239 Ordered CT abd/pel for further evaluation to r/o any abdominal  source as his prominent presentation symptoms is vomiting.     2256 Ordered Ativan due to the pt's agitation. He has also developed diarrhea in Ed as well.     2325 Ativan seemed to increase his agitation. Precedex and Haldol were ordered per 's request. Seems that Ativan might have increased his agitation.     2344 Discussed pt with  (pulmonology), who will admit.     2405 Rechecked pt who is improved after Precedex and Haldol .Heart rate is 90, BP below 100s. Advised spouse at bedside the CTA head/neck, Troponin, XR chest, and CT head were negative, and plan to get blood cultures and CT head for further evaluation. We discussed plan to treat with antibiotics and admit to ICU. Spouse understands and agrees with plan. All questions addressed.      1:03 AM  Dr. Ernst they will follow up with the CT scan of the abdomen and pelvis.    MEDICAL DECISION MAKING  Results were reviewed/discussed with the patient and they were also made aware of online access. Pt also made aware that some labs, such as cultures, will not be resulted during ER visit and follow up with PMD is necessary.     MDM  Number of Diagnoses or Management Options  Altered mental status, unspecified altered mental status type:   Hyperglycemia:   Lactic acid acidosis:   Vomiting without nausea, intractability of vomiting not specified, unspecified vomiting type:      Amount and/or Complexity of Data Reviewed  Clinical lab tests: reviewed and ordered  (Lactic acid 3.4)  Tests in the radiology section of CPT®:  ordered and reviewed (XR chest - no active airspace disease.   Ct head- No acute intracranial abnormality.   CTA head/neck- no significant stenosis or aneurysm.   CT abd/pel-   )  Tests in the medicine section of CPT®:  reviewed and ordered (See note. )  Decide to obtain previous medical records or to obtain history from someone other than the patient: yes  Review and summarize past medical records: yes (Pt has a h/o type 2 diabetes poorly controlled, hypertension, and depression.  )  Discuss the patient with other providers: yes (Pritesh (stroke neurologist),  (pulmonology))  Independent visualization of images, tracings, or specimens: yes    Patient Progress  Patient progress: stable         DIAGNOSIS  Final diagnoses:   Altered mental status, unspecified altered mental status- Acute encephalopathy   Lactic acid acidosis   Vomiting without nausea, intractability of vomiting not specified, unspecified vomiting type   Hyperglycemia   Diarrhea, unspecified type       DISPOSITION  ADMISSION    Discussed treatment plan and reason for admission with pt/family and admitting physician.  Pt/family voiced understanding of the plan for admission for further testing/treatment as needed.         Latest Documented Vital Signs:  As of 1:04 AM  BP- 174/88 HR- 105 Temp- 96.7 °F (35.9 °C) (Tympanic) O2 sat- 94%    --  Documentation assistance provided by lida Reeves for Dr.Mark Joyce MD.  Information recorded by the scribe was done at my direction and has been verified and validated by me.        Elicia Castro  12/01/17 0029       Damian Cook MD  12/01/17 0105       Electronically signed by Damian Cook MD at 12/1/2017  1:05 AM      Ellie Lawler RN at 11/30/2017  9:55 PM          [facility] reports giving 4mg zofran for vomiting en route.     Ellie Lawler RN  11/30/17 [id number]       Electronically signed by Ellie Lawler RN at 11/30/2017  9:55  PM        Vital Signs (last 24 hours)       11/30 0700  -  12/01 0659 12/01 0700  -  12/01 1126   Most Recent    Temp (°F) 96.7 -  98.3      97.9     97.9 (36.6)    Heart Rate 63 -  112    53 -  64     53    Resp 18 -  22    16 -  17     16    /69 -  (!) 224/134    101/56 -  119/67     109/61    SpO2 (%) 92 -  100    95 -  97     97          Lines, Drains & Airways    Active LDAs     Name:   Placement date:   Placement time:   Site:   Days:    Peripheral IV Line - Single Lumen 11/30/17 2145 basilic vein (medial side of arm), right 20 gauge  11/30/17 2145      less than 1    Peripheral IV Line - Single Lumen 11/30/17 basilic vein (medial side of arm), left 18 gauge  11/30/17          1    Urethral Catheter 12/01/17 0220 silicone coated 16 10 10  12/01/17    0220      less than 1                Hospital Medications (all)       Dose Frequency Start End    acyclovir (ZOVIRAX) 800 mg in sodium chloride 0.9 % 250 mL IVPB 10 mg/kg × 79.9 kg (Ideal) Every 8 Hours 12/1/2017 12/8/2017    Sig - Route: Infuse 800 mg into a venous catheter Every 8 (Eight) Hours. - Intravenous    ceFEPime (MAXIPIME) in SWFI 2g/10ml IV PUSH syringe 2 g Once 11/30/2017 11/30/2017    Sig - Route: Infuse 10 mL into a venous catheter 1 (One) Time. - Intravenous    dexmedetomidine (PRECEDEX) 400 mcg/100 mL (4 mcg/mL) infusion 0.2-1.5 mcg/kg/hr × 86.2 kg Titrated 11/30/2017     Sig - Route: Infuse 17..3 mcg/hr into a venous catheter Dose Adjusted By Provider As Needed. - Intravenous    haloperidol lactate (HALDOL) injection 10 mg 10 mg Once 11/30/2017 11/30/2017    Sig - Route: Infuse 2 mL into a venous catheter 1 (One) Time. - Intravenous    heparin (porcine) 5000 UNIT/ML injection 5,000 Units 5,000 Units Every 8 Hours Scheduled 11/30/2017     Sig - Route: Inject 1 mL under the skin Every 8 (Eight) Hours. - Subcutaneous    insulin aspart (novoLOG) injection 0-9 Units 0-9 Units 4 Times Daily With Meals & Nightly 12/1/2017     Sig - Route:  Inject 0-9 Units under the skin 4 (Four) Times a Day With Meals & at Bedtime. - Subcutaneous    insulin detemir (LEVEMIR) injection 30 Units 30 Units Nightly 12/1/2017     Sig - Route: Inject 30 Units under the skin Every Night. - Subcutaneous    iopamidol (ISOVUE-300) 61 % injection 100 mL 100 mL Once in Imaging 12/1/2017 12/1/2017    Sig - Route: Infuse 100 mL into a venous catheter Once. - Intravenous    iopamidol (ISOVUE-370) 76 % injection 100 mL 100 mL Once in Imaging 11/30/2017 11/30/2017    Sig - Route: Infuse 100 mL into a venous catheter Once. - Intravenous    LORazepam (ATIVAN) injection 1 mg 1 mg Once 11/30/2017 11/30/2017    Sig - Route: Infuse 0.5 mL into a venous catheter 1 (One) Time. - Intravenous    LORazepam (ATIVAN) injection 1 mg 1 mg Once 11/30/2017 11/30/2017    Sig - Route: Infuse 0.5 mL into a venous catheter 1 (One) Time. - Intravenous    LORazepam (ATIVAN) injection 1 mg 1 mg Once 11/30/2017 11/30/2017    Sig - Route: Infuse 0.5 mL into a venous catheter 1 (One) Time. - Intravenous    niCARdipine (CARDENE) 25 mg/250 mL (0.1 mg/mL) 0.9% NS VTB infusion 5-15 mg/hr Titrated 11/30/2017     Sig - Route: Infuse 5-15 mg/hr into a venous catheter Dose Adjusted By Provider As Needed. - Intravenous    ondansetron (ZOFRAN) injection 4 mg 4 mg Once 11/30/2017 11/30/2017    Sig - Route: Infuse 2 mL into a venous catheter 1 (One) Time. - Intravenous    potassium chloride 10 mEq in 100 mL IVPB 10 mEq Once 11/30/2017 12/1/2017    Sig - Route: Infuse 100 mL into a venous catheter 1 (One) Time. - Intravenous    potassium chloride 10 mEq in 100 mL IVPB 10 mEq Every 1 Hour PRN 12/1/2017     Sig - Route: Infuse 100 mL into a venous catheter Every 1 (One) Hour As Needed (See admin Instructions.). - Intravenous    sodium chloride 0.9 % bolus 1,000 mL 1,000 mL Once 11/30/2017 11/30/2017    Sig - Route: Infuse 1,000 mL into a venous catheter 1 (One) Time. - Intravenous    sodium chloride 0.9 % bolus 2,586 mL 30  mL/kg × 86.2 kg Continuous 11/30/2017 12/1/2017    Sig - Route: Infuse 2,586 mL into a venous catheter Continuous. - Intravenous    sodium chloride 0.9 % flush 1-10 mL 1-10 mL As Needed 11/30/2017     Sig - Route: Infuse 1-10 mL into a venous catheter As Needed for Line Care. - Intravenous    vancomycin 1750 mg/500 mL 0.9% NS IVPB ([facility]) 20 mg/kg × 86.2 kg Once 11/30/2017 12/1/2017    Sig - Route: Infuse 500 mL into a venous catheter 1 (One) Time. - Intravenous          Orders (last 24 hrs)     Start     Ordered    12/01/17 0821  POC Glucose Fingerstick  Once      12/01/17 0820    12/01/17 0800  insulin aspart (novoLOG) injection 0-9 Units  4 Times Daily With Meals & Nightly      12/01/17 0032    12/01/17 0725  SLP Consult: Eval & Treat  Once      12/01/17 0724    12/01/17 0725  SLP Evaluation - Failed Bedside Dysphagia Screening  Once      12/01/17 0724    12/01/17 0700  POC Glucose Fingerstick  4 Times Daily Before Meals & at Bedtime      12/01/17 0032    12/01/17 0600  POC Glucose Fingerstick  Every 6 Hours      12/01/17 0032    12/01/17 0600  Ammonia  Morning Draw      12/01/17 0036    12/01/17 0600  Basic Metabolic Panel  Morning Draw      12/01/17 0036    12/01/17 0600  CBC & Differential  Morning Draw      12/01/17 0036    12/01/17 0600  CBC Auto Differential  PROCEDURE ONCE      12/01/17 0036    12/01/17 0600  acyclovir (ZOVIRAX) 800 mg in sodium chloride 0.9 % 250 mL IVPB  Every 8 Hours      12/01/17 0533    12/01/17 0213  Insert Indwelling Urinary Catheter  Once     Comments:  PVR >700 ml    12/01/17 0212    12/01/17 0213  Assess Need for Indwelling Urinary Catheter - Follow Removal Protocol  Continuous     Comments:  Indwelling Urinary Catheter Removal Criteria  Discontinue Indwelling Urinary Catheter Unless One of the Following is Present  Urinary Retention or Obstruction  Chronic Chou Catheter Use  End of Life  Critical Illness with Strict I/O   Tract or Abdominal Surgery  Stage 3/4 Sacral / Perineal  Wound  Required Activity Restriction: Trauma  Required Activity Restriction: Spine Surgery  If Patient is Being Followed by Urology Contact Them PRIOR to Removal  Do Not Remove Indwelling Urinary Catheter Order is Present with a CLINICAL REASON to Maintain the Catheter. Provider is Required to Include a Clinical Reason to Maintain a Urinary Catheter    Chronic Chou Catheter Use (Present on Admission)  Assess for Continued Need & Document Medical Necessity  If Infection is Suspected, Contact the Provider        12/01/17 0212    12/01/17 0213  Catheter Care  Every Shift      12/01/17 0212    12/01/17 0210  POC Glucose Fingerstick  Once      12/01/17 0209    12/01/17 0148  Inpatient Consult to Case Management   Once     Provider:  (Not yet assigned)    12/01/17 0148    12/01/17 0148  Inpatient Consult to Nutrition  Once     Provider:  (Not yet assigned)    12/01/17 0148    12/01/17 0147  Lactic Acid, Reflex  STAT      12/01/17 0146    12/01/17 0122  iopamidol (ISOVUE-300) 61 % injection 100 mL  Once in Imaging      12/01/17 0120    12/01/17 0056  potassium chloride 10 mEq in 100 mL IVPB  Every 1 Hour PRN      12/01/17 0056    12/01/17 0033  insulin detemir (LEVEMIR) injection 30 Units  Nightly      12/01/17 0031    12/01/17 0000  Vital Signs Every Hour and Per Hospital Policy Based on Patient Condition  Every Hour      11/30/17 2353    12/01/17 0000  Intake and Output  Every Hour      11/30/17 2353    11/30/17 2355  heparin (porcine) 5000 UNIT/ML injection 5,000 Units  Every 8 Hours Scheduled      11/30/17 2353    11/30/17 2355  Procalcitonin  STAT      11/30/17 2354    11/30/17 2354  Cardiac Monitoring  Continuous      11/30/17 2353    11/30/17 2354  Continuous Pulse Oximetry  Continuous      11/30/17 2353    11/30/17 2354  Strict Bed Rest  Until Discontinued      11/30/17 2353 11/30/17 2354  Use Mobility Guidelines for Advancement of Activity  Continuous      11/30/17 2353 11/30/17 2354  Height  & Weight  Once      11/30/17 2353    11/30/17 2354  Daily Weights  Daily      11/30/17 2353    11/30/17 2354  Insert Peripheral IV  Once      11/30/17 2353    11/30/17 2354  Saline Lock & Maintain IV Access  Continuous      11/30/17 2353    11/30/17 2354  Full Code  Continuous      11/30/17 2353    11/30/17 2354  VTE Risk Assessment - Moderate Risk  Once      11/30/17 2353    11/30/17 2354  Place Sequential Compression Device  Once      11/30/17 2353    11/30/17 2354  Maintain Sequential Compression Device  Continuous      11/30/17 2353    11/30/17 2354  NPO Diet  Diet Effective Now      11/30/17 2353    11/30/17 2353  sodium chloride 0.9 % flush 1-10 mL  As Needed      11/30/17 2353    11/30/17 2352  Inpatient Admission  Once      11/30/17 2352    11/30/17 2343  Critical Care  Once     Comments:  This order was created via procedure documentation    11/30/17 2342    11/30/17 2341  Inpatient Admission  Once      11/30/17 2341    11/30/17 2337  haloperidol lactate (HALDOL) injection 10 mg  Once      11/30/17 2335    11/30/17 2332  Blood Gas, Arterial  Once      11/30/17 2331    11/30/17 2327  dexmedetomidine (PRECEDEX) 400 mcg/100 mL (4 mcg/mL) infusion  Titrated      11/30/17 2325    11/30/17 2324  Urinalysis, Microscopic Only - Urine, Clean Catch  Once      11/30/17 2323    11/30/17 2322  LORazepam (ATIVAN) injection 1 mg  Once      11/30/17 2320    11/30/17 2258  LORazepam (ATIVAN) injection 1 mg  Once      11/30/17 2256    11/30/17 2245  potassium chloride 10 mEq in 100 mL IVPB  Once      11/30/17 2243    11/30/17 2240  ceFEPime (MAXIPIME) in SWFI 2g/10ml IV PUSH syringe  Once      11/30/17 2238    11/30/17 2240  vancomycin 1750 mg/500 mL 0.9% NS IVPB ([facility])  Once      11/30/17 2238    11/30/17 2240  CT Abdomen Pelvis With Contrast  1 Time Imaging     Comments:  IV CONTRAST ONLY      11/30/17 2239 11/30/17 2236  Lactic Acid, Reflex Timer  Once      11/30/17 2235 11/30/17 2235  iopamidol (ISOVUE-370) 76 %  injection 100 mL  Once in Imaging      11/30/17 2233    11/30/17 2221  CT Angiogram Head With Contrast  1 Time Imaging      11/30/17 2221    11/30/17 2221  CT Angiogram Neck With & Without Contrast  1 Time Imaging      11/30/17 2221    11/30/17 2213  LORazepam (ATIVAN) injection 1 mg  Once      11/30/17 2211    11/30/17 2212  niCARdipine (CARDENE) 25 mg/250 mL (0.1 mg/mL) 0.9% NS VTB infusion  Titrated      11/30/17 2210 11/30/17 2152  sodium chloride 0.9 % bolus 2,586 mL  Continuous      11/30/17 2151 11/30/17 2152  ondansetron (ZOFRAN) injection 4 mg  Once      11/30/17 2151 11/30/17 2151  Cardiac Monitoring  Once,   Status:  Canceled      11/30/17 2151 11/30/17 2151  Pulse Oximetry, Continuous  Per Hospital Policy,   Status:  Canceled      11/30/17 2151    11/30/17 2151  XR Chest 1 View  1 Time Imaging      11/30/17 2151 11/30/17 2151  CK  Once      11/30/17 2151 11/30/17 2151  Magnesium  Once      11/30/17 2151 11/30/17 2151  POC Glucose Fingerstick  Once      11/30/17 2151 11/30/17 2150  Comprehensive Metabolic Panel  Once      11/30/17 2151 11/30/17 2150  CBC & Differential  Once      11/30/17 2151 11/30/17 2150  Protime-INR  Once      11/30/17 2151 11/30/17 2150  Lipase  Once      11/30/17 2151 11/30/17 2150  Urinalysis With / Culture If Indicated - Urine, Catheter  Once      11/30/17 2151 11/30/17 2150  Blood Gas, Arterial  Once      11/30/17 2151 11/30/17 2150  Troponin  Once      11/30/17 2151 11/30/17 2150  Lactic Acid, Plasma  Once      11/30/17 2151 11/30/17 2150  Ethanol  Once      11/30/17 2151 11/30/17 2150  Urine Drug Screen - Urine, Clean Catch  Once      11/30/17 2151 11/30/17 2150  ECG 12 Lead  Once      11/30/17 2151 11/30/17 2150  Blood Culture - Blood,  Once      11/30/17 2151 11/30/17 2150  Blood Culture - Blood,  Once      11/30/17 2151 11/30/17 2150  Insert 2nd peripheral IV  Once      11/30/17 2151 11/30/17 2150  CBC Auto  Differential  PROCEDURE ONCE      11/30/17 2151    11/30/17 2148  sodium chloride 0.9 % bolus 1,000 mL  Once      11/30/17 2146    11/30/17 2146  CT Head Without Contrast  1 Time Imaging      11/30/17 2146    Unscheduled  Potassium  As Needed      12/01/17 0056    --  insulin aspart (novoLOG FLEXPEN) 100 UNIT/ML solution pen-injector sc pen  3 Times Daily With Meals      12/01/17 0102    --  SCANNED - TELEMETRY        11/30/17 0000             Physician Progress Notes (last 24 hours) (Notes from 11/30/2017 11:27 AM through 12/1/2017 11:27 AM)      Brice Reza MD at 12/1/2017  5:33 AM  Version 1 of 1         Had urinary retention. Chou inserted.   Noted the normal CT abdomen.   Start empiric Acyclovir for viral encephalitis. Consider MRI.      Electronically signed by Brice Reza MD at 12/1/2017  5:34 AM
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
## 2017-12-01 NOTE — SIGNIFICANT NOTE
12/01/17 1118   Rehab Treatment   Discipline speech language pathologist   Rehab Evaluation   Evaluation Not Performed other (see comments)  (pt sedated on precedex d/t agitation)

## 2017-12-01 NOTE — PROGRESS NOTES
[facility] INPATIENT PROGRESS NOTE         [facility] EMERGENCY DEPARTMENT    12/1/2017      PATIENT IDENTIFICATION:   Name:  Carlito Mo      MRN:  [id number]     62 y.o.  male             LOS 1    Reason for visit: Follow-up altered mental status with encephalopathy, hypertensive emergency and lactic acidosis      SUBJECTIVE:   Sedate on Precedex drip holding in the emergency room.  Appears oversedated.  We'll discuss with nursing staff weaning Precedex.       Objective   OBJECTIVE:    Vital Sign Min/Max for last 24 hours  Temp  Min: 96.7 °F (35.9 °C)  Max: 98.3 °F (36.8 °C)   BP  Min: 101/56  Max: 224/134   Pulse  Min: 49  Max: 112   Resp  Min: 14  Max: 22   SpO2  Min: 92 %  Max: 100 %   No Data Recorded   Weight  Min: 190 lb (86.2 kg)  Max: 190 lb (86.2 kg)                         Body mass index is 25.07 kg/(m^2).    Intake/Output Summary (Last 24 hours) at 12/01/17 1422  Last data filed at 12/01/17 0614   Gross per 24 hour   Intake             3249 ml   Output             1850 ml   Net             1399 ml         Exam:  GEN:  No distress, appears stated age.  Minimally responsive  EYES:   PERRL, anicteric sclera  ENT:    External ears/nose normal, OP clear  NECK:  No adenopathy, midline trachea  LUNGS: Normal chest on inspection, palpation and diminished bilaterally on auscultation  CV:  Normal S1S2, without murmur  ABD:  Non tender, non distended, no hepatosplenomegaly, +BS  EXT:  No edema, cyanosis or clubbing    Scheduled meds:    acyclovir 10 mg/kg (Ideal) Intravenous Q8H   heparin (porcine) 5,000 Units Subcutaneous Q8H   insulin aspart 0-9 Units Subcutaneous 4x Daily With Meals & Nightly   insulin detemir 30 Units Subcutaneous Nightly     IV meds:                        dexmedetomidine 0.2-1.5 mcg/kg/hr Last Rate: 0.5 mcg/kg/hr (12/01/17 1248)   niCARdipine 5-15 mg/hr      Data Review:    Results from last 7 days  Lab Units 12/01/17  0207 11/30/17  2155   SODIUM mmol/L 134* 133*   POTASSIUM  mmol/L 4.1 3.2*   CHLORIDE mmol/L 97* 93*   CO2 mmol/L 23.2 19.7*   BUN mg/dL 19 22   CREATININE mg/dL 0.95 1.20   GLUCOSE mg/dL 338* 329*   CALCIUM mg/dL 8.2* 9.6         Estimated Creatinine Clearance: 98.3 mL/min (by C-G formula based on Cr of 0.95).    Results from last 7 days  Lab Units 12/01/17 0207 11/30/17 2155   WBC 10*3/mm3 12.37* 8.43   HEMOGLOBIN g/dL 11.3* 13.6*   PLATELETS 10*3/mm3 213 256       Results from last 7 days  Lab Units 11/30/17  2155   INR  0.98       Results from last 7 days  Lab Units 11/30/17 2155   ALT (SGPT) U/L 12   AST (SGOT) U/L 13       Results from last 7 days  Lab Units 11/30/17  2328   PH, ARTERIAL pH units 7.378   PO2 ART mm Hg 102.0*   PCO2, ARTERIAL mm Hg 29.4*   HCO3 ART mmol/L 17.3*       Results from last 7 days  Lab Units 12/01/17 0207 11/30/17 2156 11/30/17 2155   PROCALCITONIN ng/mL  --   --  0.07*   LACTATE mmol/L 1.7 3.4*  --          Imaging and ECG reviewed in EPIC    Micro no growth    )Assessment   ASSESSMENT:   Altered mental status/metabolic encephalopathy and possible encephalitis   Hypertensive emergency  Lactic acidosis  Chronic kidney disease at baseline  Right bundle branch block on ECG  Hypokalemia  Type 2 diabetes      PLAN:  Has had significant agitation requiring Ativan and Haldol.  Now on Precedex drip and seems oversedated.  Discussed with nursing staff bedside wean Precedex drip as able.  Awaiting ICU bed.  Consult neurology for concern of encephalitis.  Continue antibiotics with vancomycin, cefepime and acyclovir.  Control BP.  Replacing potassium.  Control glucose closely.  DVT prophylaxis     Discussed with multidisciplinary ICU team on rounds this morning.      Cct: 32 min      Damian Wright MD  Pulmonary and Critical Care Medicine  [facility], [facility]  12/1/2017    2:22 PM
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
## 2017-12-01 NOTE — PROGRESS NOTES
Had urinary retention. Chou inserted.   Noted the normal CT abdomen.   Start empiric Acyclovir for viral encephalitis. Consider MRI.

## 2017-12-02 ENCOUNTER — APPOINTMENT (OUTPATIENT)
Dept: MRI IMAGING | Facility: HOSPITAL | Age: 62
End: 2017-12-02

## 2017-12-02 LAB
ANION GAP SERPL CALCULATED.3IONS-SCNC: 6.7 MMOL/L
BUN BLD-MCNC: 22 MG/DL (ref 8–23)
BUN/CREAT SERPL: 16.9 (ref 7–25)
CALCIUM SPEC-SCNC: 8.2 MG/DL (ref 8.6–10.5)
CHLORIDE SERPL-SCNC: 109 MMOL/L (ref 98–107)
CO2 SERPL-SCNC: 27.3 MMOL/L (ref 22–29)
CREAT BLD-MCNC: 1.3 MG/DL (ref 0.76–1.27)
CRP SERPL-MCNC: 0.88 MG/DL (ref 0–0.5)
DEPRECATED RDW RBC AUTO: 40.2 FL (ref 37–54)
ERYTHROCYTE [DISTWIDTH] IN BLOOD BY AUTOMATED COUNT: 13.1 % (ref 11.5–14.5)
ERYTHROCYTE [SEDIMENTATION RATE] IN BLOOD: 22 MM/HR (ref 0–20)
GFR SERPL CREATININE-BSD FRML MDRD: 56 ML/MIN/1.73
GLUCOSE BLD-MCNC: 96 MG/DL (ref 65–99)
GLUCOSE BLDC GLUCOMTR-MCNC: 144 MG/DL (ref 70–130)
GLUCOSE BLDC GLUCOMTR-MCNC: 159 MG/DL (ref 70–130)
GLUCOSE BLDC GLUCOMTR-MCNC: 183 MG/DL (ref 70–130)
GLUCOSE BLDC GLUCOMTR-MCNC: 56 MG/DL (ref 70–130)
GLUCOSE BLDC GLUCOMTR-MCNC: 57 MG/DL (ref 70–130)
GLUCOSE BLDC GLUCOMTR-MCNC: 62 MG/DL (ref 70–130)
GLUCOSE BLDC GLUCOMTR-MCNC: 81 MG/DL (ref 70–130)
HCT VFR BLD AUTO: 32.7 % (ref 40.4–52.2)
HGB BLD-MCNC: 10.4 G/DL (ref 13.7–17.6)
MCH RBC QN AUTO: 26.3 PG (ref 27–32.7)
MCHC RBC AUTO-ENTMCNC: 31.8 G/DL (ref 32.6–36.4)
MCV RBC AUTO: 82.8 FL (ref 79.8–96.2)
PLATELET # BLD AUTO: 194 10*3/MM3 (ref 140–500)
PMV BLD AUTO: 11.3 FL (ref 6–12)
POTASSIUM BLD-SCNC: 3.6 MMOL/L (ref 3.5–5.2)
RBC # BLD AUTO: 3.95 10*6/MM3 (ref 4.6–6)
SODIUM BLD-SCNC: 143 MMOL/L (ref 136–145)
TSH SERPL DL<=0.05 MIU/L-ACNC: 7.02 MIU/ML (ref 0.27–4.2)
VIT B12 BLD-MCNC: 388 PG/ML (ref 211–946)
WBC NRBC COR # BLD: 9.42 10*3/MM3 (ref 4.5–10.7)

## 2017-12-02 PROCEDURE — 82962 GLUCOSE BLOOD TEST: CPT

## 2017-12-02 PROCEDURE — 70553 MRI BRAIN STEM W/O & W/DYE: CPT

## 2017-12-02 PROCEDURE — 82607 VITAMIN B-12: CPT | Performed by: PSYCHIATRY & NEUROLOGY

## 2017-12-02 PROCEDURE — 25010000002 HEPARIN (PORCINE) PER 1000 UNITS: Performed by: PSYCHIATRY & NEUROLOGY

## 2017-12-02 PROCEDURE — 99231 SBSQ HOSP IP/OBS SF/LOW 25: CPT | Performed by: PSYCHIATRY & NEUROLOGY

## 2017-12-02 PROCEDURE — 25010000002 HYDRALAZINE PER 20 MG: Performed by: INTERNAL MEDICINE

## 2017-12-02 PROCEDURE — 92610 EVALUATE SWALLOWING FUNCTION: CPT

## 2017-12-02 PROCEDURE — A9577 INJ MULTIHANCE: HCPCS | Performed by: INTERNAL MEDICINE

## 2017-12-02 PROCEDURE — 80048 BASIC METABOLIC PNL TOTAL CA: CPT | Performed by: INTERNAL MEDICINE

## 2017-12-02 PROCEDURE — 85652 RBC SED RATE AUTOMATED: CPT | Performed by: PSYCHIATRY & NEUROLOGY

## 2017-12-02 PROCEDURE — 84443 ASSAY THYROID STIM HORMONE: CPT | Performed by: PSYCHIATRY & NEUROLOGY

## 2017-12-02 PROCEDURE — 63710000001 INSULIN ASPART PER 5 UNITS: Performed by: INTERNAL MEDICINE

## 2017-12-02 PROCEDURE — 25010000002 ACYCLOVIR PER 5 MG: Performed by: INTERNAL MEDICINE

## 2017-12-02 PROCEDURE — 85027 COMPLETE CBC AUTOMATED: CPT | Performed by: INTERNAL MEDICINE

## 2017-12-02 PROCEDURE — 0 GADOBENATE DIMEGLUMINE 529 MG/ML SOLUTION: Performed by: INTERNAL MEDICINE

## 2017-12-02 PROCEDURE — 25010000002 ONDANSETRON PER 1 MG: Performed by: INTERNAL MEDICINE

## 2017-12-02 PROCEDURE — 86140 C-REACTIVE PROTEIN: CPT | Performed by: PSYCHIATRY & NEUROLOGY

## 2017-12-02 RX ORDER — AMLODIPINE BESYLATE 10 MG/1
10 TABLET ORAL
Status: DISCONTINUED | OUTPATIENT
Start: 2017-12-02 | End: 2017-12-06 | Stop reason: HOSPADM

## 2017-12-02 RX ORDER — BUPROPION HYDROCHLORIDE 150 MG/1
150 TABLET ORAL DAILY
Status: DISCONTINUED | OUTPATIENT
Start: 2017-12-02 | End: 2017-12-06 | Stop reason: HOSPADM

## 2017-12-02 RX ORDER — METOPROLOL TARTRATE 100 MG/1
100 TABLET ORAL EVERY 12 HOURS SCHEDULED
Status: DISCONTINUED | OUTPATIENT
Start: 2017-12-02 | End: 2017-12-06 | Stop reason: HOSPADM

## 2017-12-02 RX ORDER — DOCUSATE SODIUM 100 MG/1
100 CAPSULE, LIQUID FILLED ORAL 2 TIMES DAILY
Status: DISCONTINUED | OUTPATIENT
Start: 2017-12-02 | End: 2017-12-06 | Stop reason: HOSPADM

## 2017-12-02 RX ORDER — ONDANSETRON 2 MG/ML
4 INJECTION INTRAMUSCULAR; INTRAVENOUS EVERY 6 HOURS PRN
Status: DISCONTINUED | OUTPATIENT
Start: 2017-12-02 | End: 2017-12-06 | Stop reason: HOSPADM

## 2017-12-02 RX ORDER — ATORVASTATIN CALCIUM 80 MG/1
80 TABLET, FILM COATED ORAL DAILY
Status: DISCONTINUED | OUTPATIENT
Start: 2017-12-02 | End: 2017-12-06 | Stop reason: HOSPADM

## 2017-12-02 RX ORDER — VALSARTAN 320 MG/1
320 TABLET ORAL DAILY
Status: DISCONTINUED | OUTPATIENT
Start: 2017-12-02 | End: 2017-12-02

## 2017-12-02 RX ORDER — HYDRALAZINE HYDROCHLORIDE 20 MG/ML
10 INJECTION INTRAMUSCULAR; INTRAVENOUS EVERY 4 HOURS PRN
Status: DISCONTINUED | OUTPATIENT
Start: 2017-12-02 | End: 2017-12-06 | Stop reason: HOSPADM

## 2017-12-02 RX ORDER — ASPIRIN 325 MG
325 TABLET, DELAYED RELEASE (ENTERIC COATED) ORAL DAILY
Status: DISCONTINUED | OUTPATIENT
Start: 2017-12-02 | End: 2017-12-06 | Stop reason: HOSPADM

## 2017-12-02 RX ADMIN — DEXMEDETOMIDINE HYDROCHLORIDE 0.4 MCG/KG/HR: 4 INJECTION, SOLUTION INTRAVENOUS at 07:49

## 2017-12-02 RX ADMIN — ATORVASTATIN CALCIUM 80 MG: 80 TABLET, FILM COATED ORAL at 17:53

## 2017-12-02 RX ADMIN — METOPROLOL TARTRATE 100 MG: 100 TABLET, FILM COATED ORAL at 18:47

## 2017-12-02 RX ADMIN — AMLODIPINE BESYLATE 10 MG: 10 TABLET ORAL at 17:53

## 2017-12-02 RX ADMIN — SODIUM CHLORIDE 100 ML/HR: 9 INJECTION, SOLUTION INTRAVENOUS at 02:22

## 2017-12-02 RX ADMIN — HEPARIN SODIUM 5000 UNITS: 5000 INJECTION, SOLUTION INTRAVENOUS; SUBCUTANEOUS at 19:49

## 2017-12-02 RX ADMIN — GADOBENATE DIMEGLUMINE 20 ML: 529 INJECTION, SOLUTION INTRAVENOUS at 12:50

## 2017-12-02 RX ADMIN — INSULIN ASPART 2 UNITS: 100 INJECTION, SOLUTION INTRAVENOUS; SUBCUTANEOUS at 21:12

## 2017-12-02 RX ADMIN — ACYCLOVIR SODIUM 800 MG: 1000 INJECTION, SOLUTION INTRAVENOUS at 05:34

## 2017-12-02 RX ADMIN — BUPROPION HYDROCHLORIDE 150 MG: 150 TABLET, EXTENDED RELEASE ORAL at 17:53

## 2017-12-02 RX ADMIN — HEPARIN SODIUM 5000 UNITS: 5000 INJECTION, SOLUTION INTRAVENOUS; SUBCUTANEOUS at 13:19

## 2017-12-02 RX ADMIN — HYDRALAZINE HYDROCHLORIDE: 20 INJECTION INTRAMUSCULAR; INTRAVENOUS at 20:44

## 2017-12-02 RX ADMIN — INSULIN DETEMIR 15 UNITS: 100 INJECTION, SOLUTION SUBCUTANEOUS at 20:44

## 2017-12-02 RX ADMIN — ACYCLOVIR SODIUM 800 MG: 1000 INJECTION, SOLUTION INTRAVENOUS at 13:20

## 2017-12-02 RX ADMIN — SODIUM CHLORIDE 100 ML/HR: 9 INJECTION, SOLUTION INTRAVENOUS at 13:19

## 2017-12-02 RX ADMIN — DEXTROSE MONOHYDRATE 50 ML: 25 INJECTION, SOLUTION INTRAVENOUS at 07:36

## 2017-12-02 RX ADMIN — DEXTROSE MONOHYDRATE 50 ML: 25 INJECTION, SOLUTION INTRAVENOUS at 03:36

## 2017-12-02 RX ADMIN — ASPIRIN 325 MG: 325 TABLET, COATED ORAL at 17:53

## 2017-12-02 RX ADMIN — ONDANSETRON 4 MG: 2 INJECTION INTRAMUSCULAR; INTRAVENOUS at 16:54

## 2017-12-02 RX ADMIN — ACYCLOVIR SODIUM 800 MG: 1000 INJECTION, SOLUTION INTRAVENOUS at 21:21

## 2017-12-02 NOTE — THERAPY EVALUATION
Acute Care - Speech Language Pathology   Swallow Initial Evaluation [facility]     Patient Name: Carlito Mo  : 1955  MRN: [id number]  Today's Date: 2017               Admit Date: 2017    SPEECH-LANGUAGE PATHOLOGY EVALUATION - SWALLOW  Subjective: The patient was seen on this date for a Clinical Swallow evaluation.  Pt was lethargic, but cooperative. Per RN, pt on Precedex and being weened today.   Significant history: Pt admitted with AMS and agitation, personality changes recently, N/V. Neuro w/u in progress.   Objective: Textures given included ice, thin liquid, puree consistency and regular consistency.  Assessment: No s/s of aspiration with any PO. Cues needed at times to continue chewing, as he became sleepy at times with solid PO in his mouth. He was able to clear it with no significant oral residue. Suggest starting with [facility] soft diet with assist with PO while still sleepy/on Precedex. OK to advance as tolerated when alert.  SLP Findings:  Patient presents with functional swallow, without esophageal component.   Recommendations: Diet Textures: thin liquid, mechanical soft consistency food. OK FOR RN TO ADVANCE to regular as tolerated when alert, off Precedex. Medications should be taken whole or crushed with thin liquids.    Recommended Strategies: Upright for PO and small bites and sips. Oral care before breakfast, after all meals and PRN.  Other Recommended Evaluations: Cognitive evaluation when appropriate.    Dysphagia therapy is not recommended. Rationale: Diet tolerance only.        Visit Dx:     ICD-10-CM ICD-9-CM   1. Altered mental status, unspecified altered mental status- Acute encephalopathy R41.82 780.97   2. Lactic acid acidosis E87.2 276.2   3. Vomiting without nausea, intractability of vomiting not specified, unspecified vomiting type R11.11 787.03   4. Hyperglycemia R73.9 790.29   5. Diarrhea, unspecified type R19.7 787.91     Patient Active Problem List   Diagnosis   •  Depression   • Hyperlipidemia   • Chronic ischemic heart disease   • Vitamin D deficiency   • Erectile dysfunction   • Chronic fatigue   • Diabetes mellitus   • Skin lesion of chest wall   • Slow transit constipation   • Atherosclerosis of coronary artery   • Benign prostatic hyperplasia   • Chronic kidney disease   • History of basal cell carcinoma   • Essential hypertension   • Altered mental status     Past Medical History:   Diagnosis Date   • Acute sinusitis    • Anemia    • Arthritis    • Chronic ischemic heart disease    • Depression    • Diabetes mellitus type 2, uncontrolled, without complications    • Heart attack    • Hyperlipidemia    • Hypertension    • Screening for prostate cancer 2011   • Type 2 diabetes mellitus    • Vitamin D deficiency      Past Surgical History:   Procedure Laterality Date   • APPENDECTOMY N/A 02/14/2016    Dr. Alexey Dodson   • CORONARY ARTERY BYPASS GRAFT  11/2001          SWALLOW EVALUATION (last 72 hours)      Swallow Evaluation       12/02/17 1200                Rehab Evaluation    Document Type evaluation  -CP        Subjective Information no complaints  -CP        General Information    Patient Profile Review yes  -CP        Current Diet Limitations NPO  -CP        Prior Level of Function- Communication functional in all spheres  -CP        Prior Level of Function- Swallowing no diet consistency restrictions  -CP        Plans/Goals Discussed With patient  -CP        Barriers to Rehab cognitive status  -CP        Clinical Impression    Patient's Goals For Discharge patient did not state  -CP        SLP Swallowing Diagnosis other (see comments)   WFL  -CP        Rehab Potential/Prognosis, Swallowing good, to achieve stated therapy goals  -CP        Criteria for Skilled Therapeutic Interventions Met skilled criteria for dysphagia intervention met  -CP        FCM, Swallowing 4-->Level 4  -CP        Therapy Frequency PRN  -CP        Predicted Duration Therapy Interv (days)  until discharge  -CP        Expected Duration Therapy Session (min) 15-30 minutes  -CP        SLP Diet Recommendation soft textures;chopped;thin liquids;advance diet as tolerated  -CP        Recommended Feeding/Eating Techniques small sips/bites  -CP        SLP Rec. for Method of Medication Administration meds whole in pudding/applesauce;meds whole with thin liquid  -CP        Monitor For Signs Of Aspiration cough;gurgly voice;throat clearing  -CP        Anticipated Discharge Disposition other (see comments)   unknown  -CP        Cognitive Assessment/Intervention    Current Cognitive/Communication Assessment impaired   lethargic but cooperative as able  -CP        Oral Motor Structure and Function    Oral Motor Anatomy and Physiology patient demonstrates anatomy that is WNL  -CP        Dentition Assessment present and adequate  -CP        Secretion Management WNL/WFL  -CP        Mucosal Quality dry  -CP        Oral Musculature General Assessment WFL (within functional limits)  -CP          User Key  (r) = Recorded By, (t) = Taken By, (c) = Cosigned By    Initials Name Effective Dates    CP Lindsay Retana, MS CCC-SLP 04/13/15 -         EDUCATION  The patient has been educated in the following areas:   Dysphagia (Swallowing Impairment) Modified Diet Instruction.    SLP Recommendation and Plan  SLP Swallowing Diagnosis: other (see comments) (WFL)  SLP Diet Recommendation: soft textures, chopped, thin liquids, advance diet as tolerated  Recommended Feeding/Eating Techniques: small sips/bites  SLP Rec. for Method of Medication Administration: meds whole in pudding/applesauce, meds whole with thin liquid  Monitor For Signs Of Aspiration: cough, gurgly voice, throat clearing     Criteria for Skilled Therapeutic Interventions Met: skilled criteria for dysphagia intervention met  Anticipated Discharge Disposition: other (see comments) (unknown)  Rehab Potential/Prognosis, Swallowing: good, to achieve stated therapy  goals  Therapy Frequency: PRN                        IP SLP Goals       12/02/17 1218          Safely Consume Diet    Safely Consume Diet- SLP, Date Established 12/02/17  -CP      Safely Consume Diet- SLP, Time to Achieve by discharge  -CP        User Key  (r) = Recorded By, (t) = Taken By, (c) = Cosigned By    Initials Name Provider Type    ORLIN Retana MS CCC-SLP Speech and Language Pathologist             SLP Outcome Measures (last 72 hours)      SLP Outcome Measures       12/02/17 1200          SLP Outcome Measures    Outcome Measure Used? Adult NOMS  -CP      FCM Scores    FCM Chosen Swallowing  -CP      Swallowing FCM Score 4  -CP        User Key  (r) = Recorded By, (t) = Taken By, (c) = Cosigned By    Initials Name Effective Dates    MS RADHA Ley 04/13/15 -            Time Calculation:         Time Calculation- SLP       12/02/17 1219          Time Calculation- SLP    SLP Start Time 0730  -CP      SLP Stop Time 0830  -CP      SLP Time Calculation (min) 60 min  -CP        User Key  (r) = Recorded By, (t) = Taken By, (c) = Cosigned By    Initials Name Provider Type    ORLIN Retana MS CCC-SLP Speech and Language Pathologist          Therapy Charges for Today     Code Description Service Date Service Provider Modifiers Qty    [id number] HC ST EVAL ORAL PHARYNG SWALLOW 4 12/2/2017 Lindsay Retana MS CCC-SLP GN 1               MS RADHA Melendez  12/2/2017

## 2017-12-02 NOTE — PROCEDURES
After obtaining consent from wife an LP was performed on precodex.  Also given 1 % lidocaine as a local.  Done in lateral position.  20 g needle inserted into the SAS at about L2/L3 without difficulty.  OP is 220.  Fluid clear and colorless and sent to lab as ordered.  Tolerated procedure without difficulty.    Cristi Moss MD

## 2017-12-02 NOTE — PROGRESS NOTES
Jorge Shukla MD                          [phone]      Patient ID:    Name:  Carlito Mo    MRN:  [id number]    1955   62 y.o.  male            Patient Care Team:  García Rehman MD as PCP - General (Family Medicine)  García Rehman MD as PCP - Family Medicine    LOS: 2    CC/Reason for visit:     Subjective: Pt seen and examined this AM. No acute overnight events noted. Doing better. More awake this AM. LP yesterday. Plan for MRI today. On low dose precedex.       ROS:   Denies any fevers/ chills/ CP/ palpitations/ Nausea/ vomiting/ Diarrhoea  No Worsening cough or SOA.     Objective     Vital Signs past 24hrs    BP range: BP: ()/() 92/55  Pulse range: Heart Rate:  [49-69] 59  Resp rate range: Resp:  [14-16] 16  Temp range: Temp (24hrs), Av.1 °F (36.7 °C), Min:97.9 °F (36.6 °C), Max:98.2 °F (36.8 °C)      Ventilator/Non-Invasive Ventilation Settings     None          O2 Device: room air       258 lb (117 kg); Body mass index is 34.04 kg/(m^2).      Intake/Output Summary (Last 24 hours) at 17 0826  Last data filed at 17 0500   Gross per 24 hour   Intake             2439 ml   Output              500 ml   Net             1939 ml       Exam:    GEN:  No respiratory distress, appears stated age, unkempt. Awake and oriented x 3.   EYES:   EOM-i, anicteric sclera bilat  ENT:    External ears/nose normal, OP clear  NECK:  Supple, midline trachea, No JVD or cervical LApathy  LUNGS: Bilateral breath sounds heard, No adventitious sounds  CV:  Regular rate and rhythm, No murmur  ABD:  Non tender, no distended, no palpable liver or masses  EXT:  No cyanosis or clubbing, no peripheral/sacral edema    Scheduled meds:    acyclovir 10 mg/kg (Ideal) Intravenous Q8H   dextrose      heparin (porcine) 5,000 Units Subcutaneous Q8H   insulin aspart 0-9 Units Subcutaneous 4x Daily With Meals & Nightly   insulin detemir 15 Units Subcutaneous Nightly       IV meds:                         dexmedetomidine 0.2-1.5 mcg/kg/hr Last Rate: 0.4 mcg/kg/hr (12/02/17 0749)   hold 1 each    niCARdipine 5-15 mg/hr    sodium chloride 100 mL/hr Last Rate: 100 mL/hr (12/02/17 0222)       Data Review:        Results from last 7 days  Lab Units 12/02/17  0528 12/01/17  0207 11/30/17  2155   SODIUM mmol/L 143 134* 133*   POTASSIUM mmol/L 3.6 4.1 3.2*   CHLORIDE mmol/L 109* 97* 93*   CO2 mmol/L 27.3 23.2 19.7*   BUN mg/dL 22 19 22   CREATININE mg/dL 1.30* 0.95 1.20   CALCIUM mg/dL 8.2* 8.2* 9.6   BILIRUBIN mg/dL  --   --  0.4   ALK PHOS U/L  --   --  79   ALT (SGPT) U/L  --   --  12   AST (SGOT) U/L  --   --  13   GLUCOSE mg/dL 96 338* 329*   WBC 10*3/mm3 9.42 12.37* 8.43   HEMOGLOBIN g/dL 10.4* 11.3* 13.6*   PLATELETS 10*3/mm3 194 213 256   INR   --   --  0.98   PROCALCITONIN ng/mL  --   --  0.07*       Lab Results   Component Value Date    CALCIUM 8.2 (L) 12/02/2017         Results from last 7 days  Lab Units 12/01/17  1940 12/01/17  0205 11/30/17  2200   BLOODCX   --  No growth at 24 hours No growth at 24 hours   GRAM STAIN RESULT  No organisms seen  --   --          Results from last 7 days  Lab Units 11/30/17  2155   TROPONIN T ng/mL 0.013   CK TOTAL U/L 80       Results Review:    I have reviewed the available laboratory results and reviewed the chest imaging personally    Imaging Results (all)     Procedure Component Value Units Date/Time    CT Head Without Contrast [[id number]] Collected:  11/30/17 2217     Updated:  11/30/17 2222    Narrative:       CRANIAL CT SCAN WITHOUT CONTRAST     CLINICAL HISTORY: headache, ams     COMPARISON: None.     TECHNIQUE: Radiation dose reduction techniques were utilized, including  automated exposure control and exposure modulation based on body size.  Multiple axial images of the head were obtained without contrast.      FINDINGS:  There are no abnormal areas of increased density or mass  effect. Mild atrophy. There are moderately extensive scattered areas  of  decreased density in the white matter likely related to chronic ischemic  gliotic changes.    Ventricles, sulci, and cisterns appear normal. Bone  window images are unremarkable. Several mixed attenuation small nodules  are present in the subcutaneous fat of the scalp and likely of  dermatologic etiology. Please correlate clinically.     Case discussed with the referring physician by telephone at 10:07 PM.                Impression:       1. No acute intracranial abnormality.                     This study was performed with techniques to keep radiation doses as low  as reasonably achievable (ALARA). Individualized dose reduction  techniques using automated exposure control or adjustment of mA and/or  kV according to the patient size were employed.      This report was finalized on 11/30/2017 10:19 PM by Eliseo Haro MD.       CT Angiogram Head With Contrast [[id number]] Collected:  11/30/17 2251     Updated:  11/30/17 2301    Narrative:       CONTRAST CRANIAL CT SCAN, CT ANGIOGRAM NECK, CT ANGIOGRAM HEAD.     HISTORY: Acute CVA,      COMPARISON: 10:01 PM same day.     TECHNIQUE: Initially,thin-section contrast enhanced CT angiogram images  obtained from the aortic arch through the calvarial vertex utilizing  angiographic technique. Multi projection 3-D MIP reformatted images were  supplemented and reviewed. Subsequently, postcontrast cranial CT was  supplemented..     FINDINGS CRANIAL CT: No acute hemorrhage or midline shift is  demonstrated..  Ventricular size and configuration is within normal  limits for age..  Chronic-appearing white matter changes are again noted  and unchanged from previous exam. There is no pathologic enhancement.   Bone window images Reveal no significant osseous findings..             Impression:       No abnormal enhancement        CERVICAL CAROTID CT ANGIOGRAM:     FINDINGS:  The great vessels are widely patent at their origins. The  innominate artery bifurcation is widely patent.  Very mild plaque is  present in the cervical portion of the right common carotid artery.  Moderate plaque is present in the distal bulb and bifurcation extending  into the proximal right internal carotid artery but narrowing is less  than 50%.     There is mild plaque in the cervical segment of the left common carotid  artery with mild to moderate plaque in the distal bulb and bifurcation  again extending into the proximal left internal carotid but not  resulting in a significant narrowing.     Both internal carotid arteries are ectatic distally but widely patent to  the skull base.     The vertebral arteries are patent, the bilateral vertebral show some  luminal plaque and narrowing bilaterally at the C7 level. They are  widely patent more distally.        Per NASCET criteria, stenosis mild, 0-49%.     IMPRESSION CERVICAL CAROTID CT ANGIOGRAM:     1. Bilateral bifurcation region plaque without significant stenosis                   CRANIAL CTA ANGIOGRAM:     FINDINGS:  The bilateral vertebral arteries are patent and unremarkable.  Basilar artery is widely patent and unremarkable. The basilar artery  gives rise to both posterior cerebral arteries. There are ample and  widely patent posterior communicating arteries present.     Moderate calcified plaque is present along the cavernous segments of  both internal carotid arteries without significant focal narrowing. The  bifurcations are normal. A1 segments normal bilaterally. There is a  small, patent anterior communicating artery present. M1 segments and  bilateral trifurcations are unremarkable. The peripheral branch vessels  are unremarkable.       .          IMPRESSION CRANIAL CT ANGIOGRAM:    1. No significant stenosis or aneurysm identified.           This report was finalized on 11/30/2017 10:58 PM by Eliseo Haro MD.       CT Angiogram Neck With & Without Contrast [[id number]] Collected:  11/30/17 2251     Updated:  11/30/17 2301    Narrative:       CONTRAST  CRANIAL CT SCAN, CT ANGIOGRAM NECK, CT ANGIOGRAM HEAD.     HISTORY: Acute CVA,      COMPARISON: 10:01 PM same day.     TECHNIQUE: Initially,thin-section contrast enhanced CT angiogram images  obtained from the aortic arch through the calvarial vertex utilizing  angiographic technique. Multi projection 3-D MIP reformatted images were  supplemented and reviewed. Subsequently, postcontrast cranial CT was  supplemented..     FINDINGS CRANIAL CT: No acute hemorrhage or midline shift is  demonstrated..  Ventricular size and configuration is within normal  limits for age..  Chronic-appearing white matter changes are again noted  and unchanged from previous exam. There is no pathologic enhancement.   Bone window images Reveal no significant osseous findings..             Impression:       No abnormal enhancement        CERVICAL CAROTID CT ANGIOGRAM:     FINDINGS:  The great vessels are widely patent at their origins. The  innominate artery bifurcation is widely patent. Very mild plaque is  present in the cervical portion of the right common carotid artery.  Moderate plaque is present in the distal bulb and bifurcation extending  into the proximal right internal carotid artery but narrowing is less  than 50%.     There is mild plaque in the cervical segment of the left common carotid  artery with mild to moderate plaque in the distal bulb and bifurcation  again extending into the proximal left internal carotid but not  resulting in a significant narrowing.     Both internal carotid arteries are ectatic distally but widely patent to  the skull base.     The vertebral arteries are patent, the bilateral vertebral show some  luminal plaque and narrowing bilaterally at the C7 level. They are  widely patent more distally.        Per NASCET criteria, stenosis mild, 0-49%.     IMPRESSION CERVICAL CAROTID CT ANGIOGRAM:     1. Bilateral bifurcation region plaque without significant stenosis                   CRANIAL CTA ANGIOGRAM:      FINDINGS:  The bilateral vertebral arteries are patent and unremarkable.  Basilar artery is widely patent and unremarkable. The basilar artery  gives rise to both posterior cerebral arteries. There are ample and  widely patent posterior communicating arteries present.     Moderate calcified plaque is present along the cavernous segments of  both internal carotid arteries without significant focal narrowing. The  bifurcations are normal. A1 segments normal bilaterally. There is a  small, patent anterior communicating artery present. M1 segments and  bilateral trifurcations are unremarkable. The peripheral branch vessels  are unremarkable.       .          IMPRESSION CRANIAL CT ANGIOGRAM:    1. No significant stenosis or aneurysm identified.           This report was finalized on 11/30/2017 10:58 PM by Eliseo Haro MD.       CT Abdomen Pelvis With Contrast [[id number]] Collected:  12/01/17 0128     Updated:  12/01/17 0135    Narrative:       CT SCANS ABDOMEN AND PELVIS WITH IV CONTRAST.     HISTORY: vomiting woth ams; R41.82-Altered mental status, unspecified;  E87.2-Acidosis; R11.11-Vomiting without nausea; R73.9-Hyperglycemia,  unspecified; R19.7-Diarrhea, unspecified     COMPARISON: 08/27/2016.     TECHNIQUE: Radiation dose reduction techniques were utilized, including  automated exposure control and exposure modulation based on body size.  Axial images were obtained from the lung bases to the symphysis pubis  with IV contrast only.. Oral contrast was not administered per request.     FINDINGS ABDOMEN CT:  There is contrast in the renal collecting systems  and urinary bladder from CT angiogram performed one day earlier. Solid  organs enhance normally. Aorta is nonaneurysmal. The appendix is not  identified with certainty on this exam without oral contrast.     FINDINGS PELVIS CT:  The opacified urinary bladder without filling  defect demonstrated. There is mild prostate gland enlargement,  unchanged. There is  generalized colonic wall thickening but this is  likely due to nondistention. There are no inflammatory changes to  suggest colitis. Unopacified GI tract nonobstructive. Bilateral L5 pars  defects noted.                Impression:       IMPRESSION :   1. Stable mild prostate gland enlargement.  2. Colonic wall thickening likely due to nondistention, no acute  inflammation demonstrated.  3. Bilateral L5 pars defects.     This report was finalized on 12/1/2017 1:32 AM by Eliseo Haro MD.       XR Chest 1 View [[id number]] Collected:  11/30/17 2320     Updated:  12/01/17 0558    Narrative:       PORTABLE CHEST X-RAY     CLINICAL HISTORY: vomiting with ams     COMPARISON: 02/02/2016.     FINDINGS: Portable AP view of the chest was obtained with overlying  monitor leads in place. The lungs are very poorly aerated. There is  central hilar prominence, likely vascular and accentuated by poor  inspiratory effort. Heart is enlarged and may also be accentuated by  poor inspiration. No edema or significant pleural fluid. The patient is  status post CABG.          Impression:       Poor inspiration with hilar prominence felt to be vascular,  no active airspace disease        This report was finalized on 12/1/2017 5:55 AM by Eliseo Haro MD.             ASSESSMENT:                      Altered mental status / metabolic encephalopathy and possible encephalitis   Hypertensive emergency  Lactic acidosis  SYLVAIN on ? CKD  Right bundle branch block on ECG  Hypokalemia  Type 2 diabetes  Hypoglycemia      PLAN:  Noted hypoglycemic events. Will dec levemir to 15.  LP results noted with elevated protien normal glucose no nucleated cells . Would not expect bacterial meningitis with this pattern. Agree with dc abx but c/w acyclovir.    Will start po diet once agitation improves and start home meds including BP meds. Off cardene drip now.   SYLVAIN likely from hypotension episode. Will c/w IVF with acyclovir.   Will wean off precedex. Use prn  meds as needed. He is oriented x4.   Appreciate neuro input. Will get ECHO given concern for small embolic strokes   Replacing potassium.  DVT prophylaxis   OOB as tolerated     I have discussed my findings and recommendations with patient, nursing staff and consulting provider.     Jorge Shukla MD  12/2/2017

## 2017-12-02 NOTE — PLAN OF CARE
Problem: Inpatient SLP  Goal: Dysphagia- Patient will safely consume diet as per recommendation with no signs/symptoms of aspiration    12/02/17 1218   Safely Consume Diet   Safely Consume Diet- SLP, Date Established 12/02/17   Safely Consume Diet- SLP, Time to Achieve by discharge

## 2017-12-02 NOTE — PLAN OF CARE
Problem: Fall Risk (Adult)  Goal: Identify Related Risk Factors and Signs and Symptoms  Outcome: Ongoing (interventions implemented as appropriate)    12/02/17 0612   Fall Risk   Fall Risk: Related Risk Factors age-related changes;environment unfamiliar   Fall Risk: Signs and Symptoms presence of risk factors         Problem: Pressure Ulcer Risk (Alex Scale) (Adult,Obstetrics,Pediatric)  Goal: Identify Related Risk Factors and Signs and Symptoms  Outcome: Ongoing (interventions implemented as appropriate)    Problem: Confusion, Acute (Adult)  Goal: Identify Related Risk Factors and Signs and Symptoms  Outcome: Ongoing (interventions implemented as appropriate)    Problem: Urine Elimination, Impaired (Adult)  Goal: Identify Related Risk Factors and Signs and Symptoms  Outcome: Ongoing (interventions implemented as appropriate)    Problem: Anxiety (Adult)  Goal: Identify Related Risk Factors and Signs and Symptoms  Outcome: Ongoing (interventions implemented as appropriate)    Problem: Diabetes, Type 2 (Adult)  Goal: Signs and Symptoms of Listed Potential Problems Will be Absent or Manageable (Diabetes, Type 2)  Outcome: Ongoing (interventions implemented as appropriate)

## 2017-12-02 NOTE — PROGRESS NOTES
"Progress Note      PATIENT Carlito Mo    1955   MRN [id number]   ADMIT DATE 2017   LENGTH OF STAY 2 days   ATTENDING Brice Reza MD       CHIEF COMPLAINT: Altered Mental Status and Vomiting      DIAGNOSIS: Altered mental status [R41.82]  Lactic acid acidosis [E87.2]  Hyperglycemia [R73.9]  Altered mental status, unspecified altered mental status type [R41.82]  Diarrhea, unspecified type [R19.7]  Vomiting without nausea, intractability of vomiting not specified, unspecified vomiting type [R11.11]    HISTORY OF PRESENT ILLNESS: Mr. Mo is on low-dose Precedex.  The nurse feels he is doing better.  He has been cooperative and follows commands and is oriented this morning.  The patient voices no complaints except he wants something to eat and drink.    PHYSICAL EXAMINATION:  GENERAL: Reveals a man/woman who appears his/her stated age, in no acute distress.  VITAL SIGNS: BP 92/55  Pulse 59  Temp 98.1 °F (36.7 °C) (Oral)   Resp 16  Ht 73\" (185.4 cm)  Wt 258 lb (117 kg)  SpO2 94%  BMI 34.04 kg/m2  NECK: Carotids are equal without bruits.   HEART: Regular rate and rhythm with no significant murmur or gallop.   EXTREMITIES: Nonedematous. Pulses are intact. There is no rash. There is no hepatosplenomegaly.   NEUROLOGIC: He is awake and alert. He is oriented x3. There is no aphasia. Visual fields are full. Cranial nerves are intact. Power is normal in all 4 extremities. Toes are downgoing.  Neck is supple.      DIAGNOSTIC DATA: Spinal fluid results are unremarkable.    IMPRESSION AND PLAN: The patient looks reasonably good today.  He does better on the mental status exam today but was intubated yesterday.  It is possible that he does not have a neurologic disorder but I think we should do an MRI of the brain with and without contrast.  I'm also going to send blood for a sedimentation rate CRP B12 and TSH.  We will follow with you.    If MRI negative then can stop Acyclovir.        Cristi Wright, " MD  12/2/2017  8:48 AM

## 2017-12-02 NOTE — PLAN OF CARE
Problem: Fall Risk (Adult)  Goal: Identify Related Risk Factors and Signs and Symptoms  Outcome: Outcome(s) achieved Date Met:  12/02/17 12/02/17 1805   Fall Risk   Fall Risk: Related Risk Factors environment unfamiliar   Fall Risk: Signs and Symptoms presence of risk factors       Goal: Absence of Falls  Outcome: Ongoing (interventions implemented as appropriate)    12/02/17 1805   Fall Risk (Adult)   Absence of Falls making progress toward outcome         Problem: Pressure Ulcer Risk (Alex Scale) (Adult,Obstetrics,Pediatric)  Goal: Identify Related Risk Factors and Signs and Symptoms  Outcome: Outcome(s) achieved Date Met:  12/02/17 12/02/17 1805   Pressure Ulcer Risk (Alex Scale)   Related Risk Factors (Pressure Ulcer Risk (Alex Scale)) critical care admission       Goal: Skin Integrity  Outcome: Ongoing (interventions implemented as appropriate)    12/02/17 1805   Pressure Ulcer Risk (Alex Scale) (Adult,Obstetrics,Pediatric)   Skin Integrity making progress toward outcome         Problem: Confusion, Acute (Adult)  Goal: Cognitive/Functional Impairments Minimized  Outcome: Ongoing (interventions implemented as appropriate)    12/02/17 1805   Confusion, Acute (Adult)   Cognitive/Functional Impairments Minimized making progress toward outcome       Goal: Safety  Outcome: Ongoing (interventions implemented as appropriate)    12/02/17 1805   Confusion, Acute (Adult)   Safety making progress toward outcome         Problem: Urine Elimination, Impaired (Adult)  Goal: Effective Urinary Elimination  Outcome: Ongoing (interventions implemented as appropriate)    12/02/17 1805   Urine Elimination, Impaired (Adult)   Effective Urinary Elimination making progress toward outcome       Goal: Effective Containment of Urine  Outcome: Ongoing (interventions implemented as appropriate)    12/02/17 1805   Urine Elimination, Impaired (Adult)   Effective Containment of Urine making progress toward outcome       Goal:  Reduced Incontinence Episodes  Outcome: Ongoing (interventions implemented as appropriate)    12/02/17 1805   Urine Elimination, Impaired (Adult)   Reduced Incontinence Episodes making progress toward outcome         Problem: Anxiety (Adult)  Goal: Identify Related Risk Factors and Signs and Symptoms  Outcome: Ongoing (interventions implemented as appropriate)  Goal: Reduction/Resolution  Outcome: Ongoing (interventions implemented as appropriate)    12/02/17 1805   Anxiety (Adult)   Reduction/Resolution making progress toward outcome         Problem: Diabetes, Type 2 (Adult)  Goal: Signs and Symptoms of Listed Potential Problems Will be Absent or Manageable (Diabetes, Type 2)  Outcome: Ongoing (interventions implemented as appropriate)    12/02/17 1805   Diabetes, Type 2   Problems Assessed (Type 2 Diabetes) all   Problems Present (Type 2 Diabetes) impaired glycemic control;hypoglycemia

## 2017-12-02 NOTE — PLAN OF CARE
Problem: Patient Care Overview (Adult)  Goal: Plan of Care Review  Outcome: Ongoing (interventions implemented as appropriate)    12/02/17 2193   Coping/Psychosocial Response Interventions   Plan Of Care Reviewed With patient;spouse   Patient Care Overview   Progress progress toward functional goals is gradual   Outcome Evaluation   Outcome Summary/Follow up Plan pt remains in ccu, pt had MRI today and tolerated well, was able to wean off Precedex gtt, pt has remained calm and cooperative. F/C d/c'd, pt has voided mulitple times, unable to measue due to urine mixed with stool. Pt blood glucose improved t/o day, pt eating now. Pt c/o nausea, zofran ordered and adminsitered. pt resting comfortably at this time. b/p elevated this evening, meds ordered and administered, will contl. to monitor.

## 2017-12-03 ENCOUNTER — APPOINTMENT (OUTPATIENT)
Dept: CARDIOLOGY | Facility: HOSPITAL | Age: 62
End: 2017-12-03
Attending: INTERNAL MEDICINE

## 2017-12-03 LAB
ANION GAP SERPL CALCULATED.3IONS-SCNC: 14 MMOL/L
BASOPHILS # BLD AUTO: 0.01 10*3/MM3 (ref 0–0.2)
BASOPHILS NFR BLD AUTO: 0.1 % (ref 0–1.5)
BUN BLD-MCNC: 17 MG/DL (ref 8–23)
BUN/CREAT SERPL: 12.2 (ref 7–25)
CALCIUM SPEC-SCNC: 8.6 MG/DL (ref 8.6–10.5)
CHLORIDE SERPL-SCNC: 107 MMOL/L (ref 98–107)
CO2 SERPL-SCNC: 23 MMOL/L (ref 22–29)
CREAT BLD-MCNC: 1.39 MG/DL (ref 0.76–1.27)
DEPRECATED RDW RBC AUTO: 41.1 FL (ref 37–54)
EOSINOPHIL # BLD AUTO: 0.03 10*3/MM3 (ref 0–0.7)
EOSINOPHIL NFR BLD AUTO: 0.4 % (ref 0.3–6.2)
ERYTHROCYTE [DISTWIDTH] IN BLOOD BY AUTOMATED COUNT: 13.5 % (ref 11.5–14.5)
GFR SERPL CREATININE-BSD FRML MDRD: 52 ML/MIN/1.73
GLUCOSE BLD-MCNC: 135 MG/DL (ref 65–99)
GLUCOSE BLDC GLUCOMTR-MCNC: 122 MG/DL (ref 70–130)
GLUCOSE BLDC GLUCOMTR-MCNC: 125 MG/DL (ref 70–130)
GLUCOSE BLDC GLUCOMTR-MCNC: 143 MG/DL (ref 70–130)
GLUCOSE BLDC GLUCOMTR-MCNC: 202 MG/DL (ref 70–130)
GLUCOSE BLDC GLUCOMTR-MCNC: 205 MG/DL (ref 70–130)
GLUCOSE BLDC GLUCOMTR-MCNC: 240 MG/DL (ref 70–130)
HCT VFR BLD AUTO: 35.7 % (ref 40.4–52.2)
HGB BLD-MCNC: 11.2 G/DL (ref 13.7–17.6)
IMM GRANULOCYTES # BLD: 0 10*3/MM3 (ref 0–0.03)
IMM GRANULOCYTES NFR BLD: 0 % (ref 0–0.5)
LYMPHOCYTES # BLD AUTO: 1.22 10*3/MM3 (ref 0.9–4.8)
LYMPHOCYTES NFR BLD AUTO: 14.7 % (ref 19.6–45.3)
MAGNESIUM SERPL-MCNC: 1.7 MG/DL (ref 1.6–2.4)
MCH RBC QN AUTO: 26.2 PG (ref 27–32.7)
MCHC RBC AUTO-ENTMCNC: 31.4 G/DL (ref 32.6–36.4)
MCV RBC AUTO: 83.4 FL (ref 79.8–96.2)
MONOCYTES # BLD AUTO: 0.61 10*3/MM3 (ref 0.2–1.2)
MONOCYTES NFR BLD AUTO: 7.3 % (ref 5–12)
NEUTROPHILS # BLD AUTO: 6.43 10*3/MM3 (ref 1.9–8.1)
NEUTROPHILS NFR BLD AUTO: 77.5 % (ref 42.7–76)
PHOSPHATE SERPL-MCNC: 2.8 MG/DL (ref 2.5–4.5)
PLATELET # BLD AUTO: 216 10*3/MM3 (ref 140–500)
PMV BLD AUTO: 11.3 FL (ref 6–12)
POTASSIUM BLD-SCNC: 3.5 MMOL/L (ref 3.5–5.2)
RBC # BLD AUTO: 4.28 10*6/MM3 (ref 4.6–6)
SODIUM BLD-SCNC: 144 MMOL/L (ref 136–145)
WBC NRBC COR # BLD: 8.3 10*3/MM3 (ref 4.5–10.7)

## 2017-12-03 PROCEDURE — 84100 ASSAY OF PHOSPHORUS: CPT | Performed by: INTERNAL MEDICINE

## 2017-12-03 PROCEDURE — 99233 SBSQ HOSP IP/OBS HIGH 50: CPT | Performed by: PSYCHIATRY & NEUROLOGY

## 2017-12-03 PROCEDURE — 80048 BASIC METABOLIC PNL TOTAL CA: CPT | Performed by: INTERNAL MEDICINE

## 2017-12-03 PROCEDURE — 85025 COMPLETE CBC W/AUTO DIFF WBC: CPT | Performed by: INTERNAL MEDICINE

## 2017-12-03 PROCEDURE — 63710000001 INSULIN ASPART PER 5 UNITS: Performed by: INTERNAL MEDICINE

## 2017-12-03 PROCEDURE — 93306 TTE W/DOPPLER COMPLETE: CPT

## 2017-12-03 PROCEDURE — 25010000002 HEPARIN (PORCINE) PER 1000 UNITS: Performed by: PSYCHIATRY & NEUROLOGY

## 2017-12-03 PROCEDURE — 25010000002 PERFLUTREN (DEFINITY) 8.476 MG IN SODIUM CHLORIDE 0.9 % 10 ML INJECTION: Performed by: INTERNAL MEDICINE

## 2017-12-03 PROCEDURE — 25010000002 ONDANSETRON PER 1 MG: Performed by: INTERNAL MEDICINE

## 2017-12-03 PROCEDURE — 93306 TTE W/DOPPLER COMPLETE: CPT | Performed by: INTERNAL MEDICINE

## 2017-12-03 PROCEDURE — 83735 ASSAY OF MAGNESIUM: CPT | Performed by: INTERNAL MEDICINE

## 2017-12-03 PROCEDURE — 87040 BLOOD CULTURE FOR BACTERIA: CPT | Performed by: PSYCHIATRY & NEUROLOGY

## 2017-12-03 PROCEDURE — 82962 GLUCOSE BLOOD TEST: CPT

## 2017-12-03 RX ADMIN — INSULIN DETEMIR 15 UNITS: 100 INJECTION, SOLUTION SUBCUTANEOUS at 21:52

## 2017-12-03 RX ADMIN — HEPARIN SODIUM 5000 UNITS: 5000 INJECTION, SOLUTION INTRAVENOUS; SUBCUTANEOUS at 04:28

## 2017-12-03 RX ADMIN — ATORVASTATIN CALCIUM 80 MG: 80 TABLET, FILM COATED ORAL at 09:00

## 2017-12-03 RX ADMIN — PERFLUTREN 3 ML: 6.52 INJECTION, SUSPENSION INTRAVENOUS at 15:30

## 2017-12-03 RX ADMIN — HEPARIN SODIUM 5000 UNITS: 5000 INJECTION, SOLUTION INTRAVENOUS; SUBCUTANEOUS at 17:22

## 2017-12-03 RX ADMIN — HEPARIN SODIUM 5000 UNITS: 5000 INJECTION, SOLUTION INTRAVENOUS; SUBCUTANEOUS at 11:36

## 2017-12-03 RX ADMIN — INSULIN ASPART 4 UNITS: 100 INJECTION, SOLUTION INTRAVENOUS; SUBCUTANEOUS at 21:52

## 2017-12-03 RX ADMIN — INSULIN ASPART 4 UNITS: 100 INJECTION, SOLUTION INTRAVENOUS; SUBCUTANEOUS at 11:35

## 2017-12-03 RX ADMIN — INSULIN ASPART 4 UNITS: 100 INJECTION, SOLUTION INTRAVENOUS; SUBCUTANEOUS at 17:22

## 2017-12-03 RX ADMIN — AMLODIPINE BESYLATE 10 MG: 10 TABLET ORAL at 09:00

## 2017-12-03 RX ADMIN — ONDANSETRON 4 MG: 2 INJECTION INTRAMUSCULAR; INTRAVENOUS at 11:35

## 2017-12-03 RX ADMIN — DOCUSATE SODIUM 100 MG: 100 CAPSULE, LIQUID FILLED ORAL at 17:22

## 2017-12-03 RX ADMIN — METOPROLOL TARTRATE 100 MG: 100 TABLET, FILM COATED ORAL at 09:00

## 2017-12-03 RX ADMIN — BUPROPION HYDROCHLORIDE 150 MG: 150 TABLET, EXTENDED RELEASE ORAL at 09:00

## 2017-12-03 RX ADMIN — METOPROLOL TARTRATE 100 MG: 100 TABLET, FILM COATED ORAL at 21:52

## 2017-12-03 RX ADMIN — ASPIRIN 325 MG: 325 TABLET, COATED ORAL at 09:00

## 2017-12-03 NOTE — PLAN OF CARE
"Problem: Fall Risk (Adult)  Goal: Absence of Falls  Outcome: Ongoing (interventions implemented as appropriate)    Problem: Pressure Ulcer Risk (Alex Scale) (Adult,Obstetrics,Pediatric)  Goal: Skin Integrity  Outcome: Ongoing (interventions implemented as appropriate)    Problem: Confusion, Acute (Adult)  Goal: Identify Related Risk Factors and Signs and Symptoms  Outcome: Ongoing (interventions implemented as appropriate)  Goal: Cognitive/Functional Impairments Minimized  Outcome: Ongoing (interventions implemented as appropriate)  Goal: Safety  Outcome: Ongoing (interventions implemented as appropriate)    Problem: Urine Elimination, Impaired (Adult)  Goal: Identify Related Risk Factors and Signs and Symptoms  Outcome: Ongoing (interventions implemented as appropriate)  Goal: Effective Urinary Elimination  Outcome: Ongoing (interventions implemented as appropriate)  Goal: Effective Containment of Urine  Outcome: Ongoing (interventions implemented as appropriate)  Goal: Reduced Incontinence Episodes  Outcome: Ongoing (interventions implemented as appropriate)    Problem: Anxiety (Adult)  Goal: Identify Related Risk Factors and Signs and Symptoms  Outcome: Ongoing (interventions implemented as appropriate)  Goal: Reduction/Resolution  Outcome: Ongoing (interventions implemented as appropriate)    Problem: Diabetes, Type 2 (Adult)  Goal: Signs and Symptoms of Listed Potential Problems Will be Absent or Manageable (Diabetes, Type 2)  Outcome: Ongoing (interventions implemented as appropriate)    Problem: Patient Care Overview (Adult)  Goal: Plan of Care Review  Outcome: Ongoing (interventions implemented as appropriate)    12/03/17 0745   Outcome Evaluation   Outcome Summary/Follow up Plan Pt arrived to floor at lunch time. No c/o pain, just states \"did not sleep well.\" A&O x3 and follows commands. Bed alarm in place. Will continue to monitor patient.       Goal: Adult Individualization and Mutuality  Outcome: " Ongoing (interventions implemented as appropriate)  Goal: Discharge Needs Assessment  Outcome: Ongoing (interventions implemented as appropriate)
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
## 2017-12-03 NOTE — PROGRESS NOTES
Jorge Shukla MD                          [phone]      Patient ID:    Name:  Carlito Mo    MRN:  [id number]    1955   62 y.o.  male            Patient Care Team:  García Rehman MD as PCP - General (Family Medicine)  García Rehman MD as PCP - Family Medicine    LOS: 3    CC/Reason for visit:     Subjective: Pt seen and examined this AM. No acute overnight events noted. Doing better. More awake this AM. MRI done and report noted. BP high overnight. Improved with meds.     ROS:   Denies any fevers/ chills/ CP/ palpitations/ Nausea/ vomiting/ Diarrhoea  No Worsening cough or SOA.     Objective     Vital Signs past 24hrs    BP range: BP: (105-215)/() 154/75  Pulse range: Heart Rate:  [] 83  Resp rate range: Resp:  [16] 16  Temp range: Temp (24hrs), Av.7 °F (37.1 °C), Min:97.7 °F (36.5 °C), Max:99.3 °F (37.4 °C)      Ventilator/Non-Invasive Ventilation Settings     None          O2 Device: room air       258 lb (117 kg); Body mass index is 34.04 kg/(m^2).      Intake/Output Summary (Last 24 hours) at 17 0809  Last data filed at 17 0600   Gross per 24 hour   Intake             2774 ml   Output              425 ml   Net             2349 ml       Exam:    GEN:  No respiratory distress, appears stated age, unkempt. Awake and oriented x 3.   EYES:   EOM-i, anicteric sclera bilat   ENT:    External ears/nose normal, OP clear  NECK:  Supple, midline trachea, No JVD or cervical LApathy  LUNGS: Bilateral breath sounds heard, No adventitious sounds  CV:  Regular rate and rhythm, No murmur  ABD:  Non tender, no distended, no palpable liver or masses  EXT:  No cyanosis or clubbing, no peripheral/sacral edema    Scheduled meds:      amLODIPine 10 mg Oral Q24H   aspirin  mg Oral Daily   atorvastatin 80 mg Oral Daily   buPROPion  mg Oral Daily   docusate sodium 100 mg Oral BID   heparin (porcine) 5,000 Units Subcutaneous Q8H   insulin aspart 0-9  Units Subcutaneous 4x Daily With Meals & Nightly   insulin detemir 15 Units Subcutaneous Nightly   metoprolol tartrate 100 mg Oral Q12H       IV meds:                          hold 1 each       Data Review:        Results from last 7 days  Lab Units 12/02/17 0528 12/01/17  0207 11/30/17  2155   SODIUM mmol/L 143 134* 133*   POTASSIUM mmol/L 3.6 4.1 3.2*   CHLORIDE mmol/L 109* 97* 93*   CO2 mmol/L 27.3 23.2 19.7*   BUN mg/dL 22 19 22   CREATININE mg/dL 1.30* 0.95 1.20   CALCIUM mg/dL 8.2* 8.2* 9.6   BILIRUBIN mg/dL  --   --  0.4   ALK PHOS U/L  --   --  79   ALT (SGPT) U/L  --   --  12   AST (SGOT) U/L  --   --  13   GLUCOSE mg/dL 96 338* 329*   WBC 10*3/mm3 9.42 12.37* 8.43   HEMOGLOBIN g/dL 10.4* 11.3* 13.6*   PLATELETS 10*3/mm3 194 213 256   INR   --   --  0.98   PROCALCITONIN ng/mL  --   --  0.07*       Lab Results   Component Value Date    CALCIUM 8.2 (L) 12/02/2017         Results from last 7 days  Lab Units 12/01/17  1940 12/01/17  0205 11/30/17  2200   BLOODCX   --  No growth at 2 days No growth at 2 days   GRAM STAIN RESULT  No organisms seen  --   --          Results from last 7 days  Lab Units 11/30/17  2155   TROPONIN T ng/mL 0.013   CK TOTAL U/L 80       Results Review:    I have reviewed the available laboratory results and reviewed the chest imaging personally    Imaging Results (all)     Procedure Component Value Units Date/Time    CT Head Without Contrast [[id number]] Collected:  11/30/17 2217     Updated:  11/30/17 2222    Narrative:       CRANIAL CT SCAN WITHOUT CONTRAST     CLINICAL HISTORY: headache, ams     COMPARISON: None.     TECHNIQUE: Radiation dose reduction techniques were utilized, including  automated exposure control and exposure modulation based on body size.  Multiple axial images of the head were obtained without contrast.      FINDINGS:  There are no abnormal areas of increased density or mass  effect. Mild atrophy. There are moderately extensive scattered areas of  decreased  density in the white matter likely related to chronic ischemic  gliotic changes.    Ventricles, sulci, and cisterns appear normal. Bone  window images are unremarkable. Several mixed attenuation small nodules  are present in the subcutaneous fat of the scalp and likely of  dermatologic etiology. Please correlate clinically.     Case discussed with the referring physician by telephone at 10:07 PM.                Impression:       1. No acute intracranial abnormality.                     This study was performed with techniques to keep radiation doses as low  as reasonably achievable (ALARA). Individualized dose reduction  techniques using automated exposure control or adjustment of mA and/or  kV according to the patient size were employed.      This report was finalized on 11/30/2017 10:19 PM by Eliseo Haro MD.       CT Angiogram Head With Contrast [[id number]] Collected:  11/30/17 2251     Updated:  11/30/17 2301    Narrative:       CONTRAST CRANIAL CT SCAN, CT ANGIOGRAM NECK, CT ANGIOGRAM HEAD.     HISTORY: Acute CVA,      COMPARISON: 10:01 PM same day.     TECHNIQUE: Initially,thin-section contrast enhanced CT angiogram images  obtained from the aortic arch through the calvarial vertex utilizing  angiographic technique. Multi projection 3-D MIP reformatted images were  supplemented and reviewed. Subsequently, postcontrast cranial CT was  supplemented..     FINDINGS CRANIAL CT: No acute hemorrhage or midline shift is  demonstrated..  Ventricular size and configuration is within normal  limits for age..  Chronic-appearing white matter changes are again noted  and unchanged from previous exam. There is no pathologic enhancement.   Bone window images Reveal no significant osseous findings..             Impression:       No abnormal enhancement        CERVICAL CAROTID CT ANGIOGRAM:     FINDINGS:  The great vessels are widely patent at their origins. The  innominate artery bifurcation is widely patent. Very mild  plaque is  present in the cervical portion of the right common carotid artery.  Moderate plaque is present in the distal bulb and bifurcation extending  into the proximal right internal carotid artery but narrowing is less  than 50%.     There is mild plaque in the cervical segment of the left common carotid  artery with mild to moderate plaque in the distal bulb and bifurcation  again extending into the proximal left internal carotid but not  resulting in a significant narrowing.     Both internal carotid arteries are ectatic distally but widely patent to  the skull base.     The vertebral arteries are patent, the bilateral vertebral show some  luminal plaque and narrowing bilaterally at the C7 level. They are  widely patent more distally.        Per NASCET criteria, stenosis mild, 0-49%.     IMPRESSION CERVICAL CAROTID CT ANGIOGRAM:     1. Bilateral bifurcation region plaque without significant stenosis                   CRANIAL CTA ANGIOGRAM:     FINDINGS:  The bilateral vertebral arteries are patent and unremarkable.  Basilar artery is widely patent and unremarkable. The basilar artery  gives rise to both posterior cerebral arteries. There are ample and  widely patent posterior communicating arteries present.     Moderate calcified plaque is present along the cavernous segments of  both internal carotid arteries without significant focal narrowing. The  bifurcations are normal. A1 segments normal bilaterally. There is a  small, patent anterior communicating artery present. M1 segments and  bilateral trifurcations are unremarkable. The peripheral branch vessels  are unremarkable.       .          IMPRESSION CRANIAL CT ANGIOGRAM:    1. No significant stenosis or aneurysm identified.           This report was finalized on 11/30/2017 10:58 PM by Eliseo Haro MD.       CT Angiogram Neck With & Without Contrast [[id number]] Collected:  11/30/17 2251     Updated:  11/30/17 2301    Narrative:       CONTRAST CRANIAL CT  SCAN, CT ANGIOGRAM NECK, CT ANGIOGRAM HEAD.     HISTORY: Acute CVA,      COMPARISON: 10:01 PM same day.     TECHNIQUE: Initially,thin-section contrast enhanced CT angiogram images  obtained from the aortic arch through the calvarial vertex utilizing  angiographic technique. Multi projection 3-D MIP reformatted images were  supplemented and reviewed. Subsequently, postcontrast cranial CT was  supplemented..     FINDINGS CRANIAL CT: No acute hemorrhage or midline shift is  demonstrated..  Ventricular size and configuration is within normal  limits for age..  Chronic-appearing white matter changes are again noted  and unchanged from previous exam. There is no pathologic enhancement.   Bone window images Reveal no significant osseous findings..             Impression:       No abnormal enhancement        CERVICAL CAROTID CT ANGIOGRAM:     FINDINGS:  The great vessels are widely patent at their origins. The  innominate artery bifurcation is widely patent. Very mild plaque is  present in the cervical portion of the right common carotid artery.  Moderate plaque is present in the distal bulb and bifurcation extending  into the proximal right internal carotid artery but narrowing is less  than 50%.     There is mild plaque in the cervical segment of the left common carotid  artery with mild to moderate plaque in the distal bulb and bifurcation  again extending into the proximal left internal carotid but not  resulting in a significant narrowing.     Both internal carotid arteries are ectatic distally but widely patent to  the skull base.     The vertebral arteries are patent, the bilateral vertebral show some  luminal plaque and narrowing bilaterally at the C7 level. They are  widely patent more distally.        Per NASCET criteria, stenosis mild, 0-49%.     IMPRESSION CERVICAL CAROTID CT ANGIOGRAM:     1. Bilateral bifurcation region plaque without significant stenosis                   CRANIAL CTA ANGIOGRAM:     FINDINGS:   The bilateral vertebral arteries are patent and unremarkable.  Basilar artery is widely patent and unremarkable. The basilar artery  gives rise to both posterior cerebral arteries. There are ample and  widely patent posterior communicating arteries present.     Moderate calcified plaque is present along the cavernous segments of  both internal carotid arteries without significant focal narrowing. The  bifurcations are normal. A1 segments normal bilaterally. There is a  small, patent anterior communicating artery present. M1 segments and  bilateral trifurcations are unremarkable. The peripheral branch vessels  are unremarkable.       .          IMPRESSION CRANIAL CT ANGIOGRAM:    1. No significant stenosis or aneurysm identified.           This report was finalized on 11/30/2017 10:58 PM by Eliseo Haro MD.       CT Abdomen Pelvis With Contrast [[id number]] Collected:  12/01/17 0128     Updated:  12/01/17 0135    Narrative:       CT SCANS ABDOMEN AND PELVIS WITH IV CONTRAST.     HISTORY: vomiting woth ams; R41.82-Altered mental status, unspecified;  E87.2-Acidosis; R11.11-Vomiting without nausea; R73.9-Hyperglycemia,  unspecified; R19.7-Diarrhea, unspecified     COMPARISON: 08/27/2016.     TECHNIQUE: Radiation dose reduction techniques were utilized, including  automated exposure control and exposure modulation based on body size.  Axial images were obtained from the lung bases to the symphysis pubis  with IV contrast only.. Oral contrast was not administered per request.     FINDINGS ABDOMEN CT:  There is contrast in the renal collecting systems  and urinary bladder from CT angiogram performed one day earlier. Solid  organs enhance normally. Aorta is nonaneurysmal. The appendix is not  identified with certainty on this exam without oral contrast.     FINDINGS PELVIS CT:  The opacified urinary bladder without filling  defect demonstrated. There is mild prostate gland enlargement,  unchanged. There is generalized  colonic wall thickening but this is  likely due to nondistention. There are no inflammatory changes to  suggest colitis. Unopacified GI tract nonobstructive. Bilateral L5 pars  defects noted.                Impression:       IMPRESSION :   1. Stable mild prostate gland enlargement.  2. Colonic wall thickening likely due to nondistention, no acute  inflammation demonstrated.  3. Bilateral L5 pars defects.     This report was finalized on 12/1/2017 1:32 AM by Eliseo Haro MD.       XR Chest 1 View [[id number]] Collected:  11/30/17 2320     Updated:  12/01/17 0558    Narrative:       PORTABLE CHEST X-RAY     CLINICAL HISTORY: vomiting with ams     COMPARISON: 02/02/2016.     FINDINGS: Portable AP view of the chest was obtained with overlying  monitor leads in place. The lungs are very poorly aerated. There is  central hilar prominence, likely vascular and accentuated by poor  inspiratory effort. Heart is enlarged and may also be accentuated by  poor inspiration. No edema or significant pleural fluid. The patient is  status post CABG.          Impression:       Poor inspiration with hilar prominence felt to be vascular,  no active airspace disease        This report was finalized on 12/1/2017 5:55 AM by Eliseo Haro MD.             ASSESSMENT:                      Altered mental status / metabolic encephalopathy and possible encephalitis   Hypertensive emergency  Lactic acidosis  SYLVAIN on ? CKD  Right bundle branch block on ECG  Hypokalemia  Type 2 diabetes  Hypoglycemia      PLAN:  Blood sugars better with decreased dose of Levemir.  LP results noted with elevated protien normal glucose no nucleated cells . Would not expect bacterial meningitis with this pattern.  Noted neurology recommendations to hold acyclovir as well if the MRI doesn't show evidence of encephalitis.  Doing well on blood pressure medications.  We will continue  SYLVAIN likely from hypotension episode.  Urine output better but not measured due to Chou  catheter being removed.  Creatinine slightly worse but we will correct continue to monitor   Appreciate neuro input. Will get ECHO given concern for small embolic strokes   Replacing potassium.  DVT prophylaxis   OOB as tolerated   Will tx out of ICU    I have discussed my findings and recommendations with patient, nursing staff and consulting provider.     Jorge Shukla MD  12/3/2017

## 2017-12-03 NOTE — CONSULTS
"Adult Nutrition  Assessment/PES    Patient Name:  Carlito Mo  YOB: 1955  MRN: [id number]  Admit Date:  11/30/2017    Assessment Date:  12/3/2017    Comments:  Consult, MST 2 per nurse admission screen.  Patient reports fine appetite, says today at lunch is first time he's really eaten since being here.  He reports 40# weight loss x past couple of years.  Says this was unintentional, but then goes on to say that him and his wife started making healthier choices, stopped going to buffets to eat, wife has been trying to lose weight.  Looks like possible 11# weight loss since March of this year.      Will follow for intake and will attempt to interview wife, she is not here at time of visit today.          Reason for Assessment       12/03/17 1351    Reason for Assessment    Reason For Assessment/Visit identified at risk by screening criteria;admission assessment;nurse/nurse practitioner consult    Diagnosis Diagnosis    Cardiac HTN;MI    Endocrine DM Type 2    Neurological AMS    Ortho Osteoarthritis    Psychosocial Depression              Nutrition/Diet History       12/03/17 1353    Nutrition/Diet History    Typical Food/Fluid Intake patient reports fine appetite, first actual meal he has had per patient at lunch today            Anthropometrics       12/03/17 1354    Anthropometrics    Height 185.4 cm (72.99\")    Weight 87.2 kg (192 lb 3.9 oz)    RD Documented Current Weight  87.2 kg (192 lb 3.9 oz)    Anthropometrics (Special Considerations)    RD Calculated BMI (kg/m2) 25.35    Ideal Body Weight (IBW)    Ideal Body Weight (IBW), Male (kg) 84.84    % Ideal Body Weight 103    Usual Body Weight (UBW)    Weight Loss 4.99 kg (11 lb)    % Weight Loss  5.5 %    Weight Loss Time Frame 8 months    Body Mass Index (BMI)    BMI (kg/m2) 25.42    BMI Grade 25 - 29.9 - overweight            Labs/Tests/Procedures/Meds       12/03/17 1356    Labs/Tests/Procedures/Meds    Diagnostic Test/Procedure Review reviewed, " pertinent    Labs/Tests Review Reviewed    Procedure Review MRI;SLP    Swallow eval status Done    Type of SLP Evaluation Bedside    Medication Review Reviewed, pertinent    Significant Vitals reviewed, pertinent            Physical Findings       12/03/17 1356    Physical Findings/Assessment    Additional Documentation Physical Appearance (Group)   seems very out of it    Physical Appearance    Overall Physical Appearance overweight    Skin --   B=20, intact              Nutrition Prescription Ordered       12/03/17 1357    Nutrition Prescription PO    Current PO Diet Regular    Common Modifiers Consistent Carbohydrate            Evaluation of Received Nutrient/Fluid Intake       12/03/17 1357    PO Evaluation    Number of Meals 1    % PO Intake 25            Problem/Interventions:        Problem 1       12/03/17 1357    Nutrition Diagnoses Problem 1    Problem 1 Predicted Suboptimal Intake    Etiology (related to) Factors Affecting Nutrition    Appetite Fair    Signs/Symptoms (evidenced by) Report/Observation    Reported/Observed By Patient                    Intervention Goal       12/03/17 1358    Intervention Goal    General Maintain nutrition;Disease management/therapy;Reduce/improve symptoms    PO Increase intake;PO intake (%)    PO Intake % 75 %    Weight No significant weight loss            Nutrition Intervention       12/03/17 1358    Nutrition Intervention    RD/Tech Action Follow Tx progress;Care plan reviewd;Encourage intake              Education/Evaluation       12/03/17 1358    Education    Education Will Instruct as appropriate    Monitor/Evaluation    Monitor Per protocol;PO intake;Weight;Symptoms        Electronically signed by:  Cayla Sam RD  12/03/17 1:59 PM

## 2017-12-03 NOTE — PLAN OF CARE
Problem: Fall Risk (Adult)  Goal: Absence of Falls  Outcome: Ongoing (interventions implemented as appropriate)    12/03/17 0617   Fall Risk (Adult)   Absence of Falls achieves outcome         Problem: Confusion, Acute (Adult)  Goal: Identify Related Risk Factors and Signs and Symptoms  Outcome: Ongoing (interventions implemented as appropriate)    Problem: Urine Elimination, Impaired (Adult)  Goal: Identify Related Risk Factors and Signs and Symptoms  Outcome: Ongoing (interventions implemented as appropriate)    Problem: Anxiety (Adult)  Goal: Identify Related Risk Factors and Signs and Symptoms  Outcome: Ongoing (interventions implemented as appropriate)    Problem: Diabetes, Type 2 (Adult)  Goal: Signs and Symptoms of Listed Potential Problems Will be Absent or Manageable (Diabetes, Type 2)  Outcome: Ongoing (interventions implemented as appropriate)    Problem: Patient Care Overview (Adult)  Goal: Plan of Care Review  Outcome: Ongoing (interventions implemented as appropriate)

## 2017-12-04 ENCOUNTER — TELEPHONE (OUTPATIENT)
Dept: CARDIOLOGY | Facility: CLINIC | Age: 62
End: 2017-12-04

## 2017-12-04 LAB
ANION GAP SERPL CALCULATED.3IONS-SCNC: 11.3 MMOL/L
AORTIC DIMENSIONLESS INDEX: 0.6 (DI)
BACTERIA SPEC AEROBE CULT: NORMAL
BASOPHILS # BLD AUTO: 0.01 10*3/MM3 (ref 0–0.2)
BASOPHILS NFR BLD AUTO: 0.1 % (ref 0–1.5)
BH CV ECHO MEAS - ACS: 1.8 CM
BH CV ECHO MEAS - AO MAX PG: 9 MMHG
BH CV ECHO MEAS - AO MEAN PG (FULL): 3 MMHG
BH CV ECHO MEAS - AO MEAN PG: 5 MMHG
BH CV ECHO MEAS - AO ROOT AREA (BSA CORRECTED): 1.5
BH CV ECHO MEAS - AO ROOT AREA: 7.5 CM^2
BH CV ECHO MEAS - AO ROOT DIAM: 3.1 CM
BH CV ECHO MEAS - AO V2 MAX: 153 CM/SEC
BH CV ECHO MEAS - AO V2 MEAN: 107 CM/SEC
BH CV ECHO MEAS - AO V2 VTI: 30.2 CM
BH CV ECHO MEAS - ASC AORTA: 3 CM
BH CV ECHO MEAS - AVA(I,A): 1.8 CM^2
BH CV ECHO MEAS - AVA(I,D): 1.8 CM^2
BH CV ECHO MEAS - BSA(HAYCOCK): 2.1 M^2
BH CV ECHO MEAS - BSA: 2.1 M^2
BH CV ECHO MEAS - BZI_BMI: 26 KILOGRAMS/M^2
BH CV ECHO MEAS - BZI_METRIC_HEIGHT: 182.9 CM
BH CV ECHO MEAS - BZI_METRIC_WEIGHT: 87.1 KG
BH CV ECHO MEAS - CONTRAST EF (2CH): 68.9 ML/M^2
BH CV ECHO MEAS - CONTRAST EF 4CH: 68.5 ML/M^2
BH CV ECHO MEAS - EDV(CUBED): 97.3 ML
BH CV ECHO MEAS - EDV(MOD-SP2): 90 ML
BH CV ECHO MEAS - EDV(MOD-SP4): 92 ML
BH CV ECHO MEAS - EDV(TEICH): 97.3 ML
BH CV ECHO MEAS - EF(CUBED): 66.3 %
BH CV ECHO MEAS - EF(MOD-SP2): 68.9 %
BH CV ECHO MEAS - EF(MOD-SP4): 68.5 %
BH CV ECHO MEAS - EF(TEICH): 57.9 %
BH CV ECHO MEAS - ESV(CUBED): 32.8 ML
BH CV ECHO MEAS - ESV(MOD-SP2): 28 ML
BH CV ECHO MEAS - ESV(MOD-SP4): 29 ML
BH CV ECHO MEAS - ESV(TEICH): 41 ML
BH CV ECHO MEAS - FS: 30.4 %
BH CV ECHO MEAS - IVS/LVPW: 0.92
BH CV ECHO MEAS - IVSD: 1.1 CM
BH CV ECHO MEAS - LAT PEAK E' VEL: 6 CM/SEC
BH CV ECHO MEAS - LV DIASTOLIC VOL/BSA (35-75): 43.9 ML/M^2
BH CV ECHO MEAS - LV MASS(C)D: 192.9 GRAMS
BH CV ECHO MEAS - LV MASS(C)DI: 92.1 GRAMS/M^2
BH CV ECHO MEAS - LV MEAN PG: 2 MMHG
BH CV ECHO MEAS - LV SYSTOLIC VOL/BSA (12-30): 13.9 ML/M^2
BH CV ECHO MEAS - LV V1 MAX: 101 CM/SEC
BH CV ECHO MEAS - LV V1 MEAN: 67.7 CM/SEC
BH CV ECHO MEAS - LV V1 VTI: 19 CM
BH CV ECHO MEAS - LVIDD: 4.6 CM
BH CV ECHO MEAS - LVIDS: 3.2 CM
BH CV ECHO MEAS - LVLD AP2: 8.4 CM
BH CV ECHO MEAS - LVLD AP4: 10.1 CM
BH CV ECHO MEAS - LVLS AP2: 7.7 CM
BH CV ECHO MEAS - LVLS AP4: 8.3 CM
BH CV ECHO MEAS - LVOT AREA (M): 2.8 CM^2
BH CV ECHO MEAS - LVOT AREA: 2.8 CM^2
BH CV ECHO MEAS - LVOT DIAM: 1.9 CM
BH CV ECHO MEAS - LVPWD: 1.2 CM
BH CV ECHO MEAS - MED PEAK E' VEL: 4 CM/SEC
BH CV ECHO MEAS - MR MAX PG: 10.2 MMHG
BH CV ECHO MEAS - MR MAX VEL: 160 CM/SEC
BH CV ECHO MEAS - MV A DUR: 0.17 SEC
BH CV ECHO MEAS - MV A MAX VEL: 57.3 CM/SEC
BH CV ECHO MEAS - MV DEC SLOPE: 382 CM/SEC^2
BH CV ECHO MEAS - MV DEC TIME: 0.2 SEC
BH CV ECHO MEAS - MV E MAX VEL: 117 CM/SEC
BH CV ECHO MEAS - MV E/A: 2
BH CV ECHO MEAS - MV MAX PG: 6 MMHG
BH CV ECHO MEAS - MV MEAN PG: 2 MMHG
BH CV ECHO MEAS - MV P1/2T MAX VEL: 131 CM/SEC
BH CV ECHO MEAS - MV P1/2T: 100.4 MSEC
BH CV ECHO MEAS - MV V2 MEAN: 74 CM/SEC
BH CV ECHO MEAS - MV V2 VTI: 34.7 CM
BH CV ECHO MEAS - MVA P1/2T LCG: 1.7 CM^2
BH CV ECHO MEAS - MVA(P1/2T): 2.2 CM^2
BH CV ECHO MEAS - MVA(VTI): 1.6 CM^2
BH CV ECHO MEAS - PA ACC SLOPE: 1339 CM/SEC^2
BH CV ECHO MEAS - PA ACC TIME: 0.08 SEC
BH CV ECHO MEAS - PA MAX PG (FULL): 2.9 MMHG
BH CV ECHO MEAS - PA MAX PG: 4.7 MMHG
BH CV ECHO MEAS - PA PR(ACCEL): 44.4 MMHG
BH CV ECHO MEAS - PA V2 MAX: 108 CM/SEC
BH CV ECHO MEAS - PULM A REVS DUR: 0.13 SEC
BH CV ECHO MEAS - PULM A REVS VEL: 24.3 CM/SEC
BH CV ECHO MEAS - PULM DIAS VEL: 53.6 CM/SEC
BH CV ECHO MEAS - PULM S/D: 0.82
BH CV ECHO MEAS - PULM SYS VEL: 43.8 CM/SEC
BH CV ECHO MEAS - PVA(V,A): 2.4 CM^2
BH CV ECHO MEAS - PVA(V,D): 2.4 CM^2
BH CV ECHO MEAS - QP/QS: 1
BH CV ECHO MEAS - RAP SYSTOLE: 8 MMHG
BH CV ECHO MEAS - RV MAX PG: 1.8 MMHG
BH CV ECHO MEAS - RV MEAN PG: 1 MMHG
BH CV ECHO MEAS - RV V1 MAX: 66.9 CM/SEC
BH CV ECHO MEAS - RV V1 MEAN: 45.1 CM/SEC
BH CV ECHO MEAS - RV V1 VTI: 14.2 CM
BH CV ECHO MEAS - RVOT AREA: 3.8 CM^2
BH CV ECHO MEAS - RVOT DIAM: 2.2 CM
BH CV ECHO MEAS - RVSP: 20.7 MMHG
BH CV ECHO MEAS - SI(AO): 108.9 ML/M^2
BH CV ECHO MEAS - SI(CUBED): 30.8 ML/M^2
BH CV ECHO MEAS - SI(LVOT): 25.7 ML/M^2
BH CV ECHO MEAS - SI(MOD-SP2): 29.6 ML/M^2
BH CV ECHO MEAS - SI(MOD-SP4): 30.1 ML/M^2
BH CV ECHO MEAS - SI(TEICH): 26.9 ML/M^2
BH CV ECHO MEAS - SV(AO): 227.9 ML
BH CV ECHO MEAS - SV(CUBED): 64.6 ML
BH CV ECHO MEAS - SV(LVOT): 53.9 ML
BH CV ECHO MEAS - SV(MOD-SP2): 62 ML
BH CV ECHO MEAS - SV(MOD-SP4): 63 ML
BH CV ECHO MEAS - SV(RVOT): 54 ML
BH CV ECHO MEAS - SV(TEICH): 56.4 ML
BH CV ECHO MEAS - TAPSE (>1.6): 1.4 CM2
BH CV ECHO MEAS - TR MAX VEL: 178 CM/SEC
BH CV VAS BP RIGHT ARM: NORMAL MMHG
BH CV XLRA - RV BASE: 3.3 CM
BH CV XLRA - TDI S': 12 CM/SEC
BUN BLD-MCNC: 19 MG/DL (ref 8–23)
BUN/CREAT SERPL: 13.6 (ref 7–25)
CALCIUM SPEC-SCNC: 8.5 MG/DL (ref 8.6–10.5)
CHLORIDE SERPL-SCNC: 106 MMOL/L (ref 98–107)
CO2 SERPL-SCNC: 24.7 MMOL/L (ref 22–29)
CREAT BLD-MCNC: 1.4 MG/DL (ref 0.76–1.27)
CYTO UR: NORMAL
DEPRECATED RDW RBC AUTO: 41 FL (ref 37–54)
E/E' RATIO: 23
EOSINOPHIL # BLD AUTO: 0.22 10*3/MM3 (ref 0–0.7)
EOSINOPHIL NFR BLD AUTO: 3 % (ref 0.3–6.2)
ERYTHROCYTE [DISTWIDTH] IN BLOOD BY AUTOMATED COUNT: 13.5 % (ref 11.5–14.5)
GFR SERPL CREATININE-BSD FRML MDRD: 51 ML/MIN/1.73
GLUCOSE BLD-MCNC: 69 MG/DL (ref 65–99)
GLUCOSE BLDC GLUCOMTR-MCNC: 174 MG/DL (ref 70–130)
GLUCOSE BLDC GLUCOMTR-MCNC: 187 MG/DL (ref 70–130)
GLUCOSE BLDC GLUCOMTR-MCNC: 213 MG/DL (ref 70–130)
GLUCOSE BLDC GLUCOMTR-MCNC: 74 MG/DL (ref 70–130)
GRAM STN SPEC: NORMAL
HCT VFR BLD AUTO: 34.1 % (ref 40.4–52.2)
HGB BLD-MCNC: 10.7 G/DL (ref 13.7–17.6)
IMM GRANULOCYTES # BLD: 0 10*3/MM3 (ref 0–0.03)
IMM GRANULOCYTES NFR BLD: 0 % (ref 0–0.5)
LAB AP CASE REPORT: NORMAL
LEFT ATRIUM VOLUME INDEX: 33 ML/M2
LYMPHOCYTES # BLD AUTO: 1.55 10*3/MM3 (ref 0.9–4.8)
LYMPHOCYTES NFR BLD AUTO: 21.3 % (ref 19.6–45.3)
Lab: NORMAL
MAGNESIUM SERPL-MCNC: 1.8 MG/DL (ref 1.6–2.4)
MAXIMAL PREDICTED HEART RATE: 158 BPM
MCH RBC QN AUTO: 26.3 PG (ref 27–32.7)
MCHC RBC AUTO-ENTMCNC: 31.4 G/DL (ref 32.6–36.4)
MCV RBC AUTO: 83.8 FL (ref 79.8–96.2)
MONOCYTES # BLD AUTO: 0.69 10*3/MM3 (ref 0.2–1.2)
MONOCYTES NFR BLD AUTO: 9.5 % (ref 5–12)
NEUTROPHILS # BLD AUTO: 4.82 10*3/MM3 (ref 1.9–8.1)
NEUTROPHILS NFR BLD AUTO: 66.1 % (ref 42.7–76)
PATH REPORT.FINAL DX SPEC: NORMAL
PATH REPORT.GROSS SPEC: NORMAL
PHOSPHATE SERPL-MCNC: 2.9 MG/DL (ref 2.5–4.5)
PLATELET # BLD AUTO: 213 10*3/MM3 (ref 140–500)
PMV BLD AUTO: 11.1 FL (ref 6–12)
POTASSIUM BLD-SCNC: 3.5 MMOL/L (ref 3.5–5.2)
RBC # BLD AUTO: 4.07 10*6/MM3 (ref 4.6–6)
REAGIN AB CSF QL: NON REACTIVE
SODIUM BLD-SCNC: 142 MMOL/L (ref 136–145)
STRESS TARGET HR: 134 BPM
WBC NRBC COR # BLD: 7.29 10*3/MM3 (ref 4.5–10.7)

## 2017-12-04 PROCEDURE — 99233 SBSQ HOSP IP/OBS HIGH 50: CPT | Performed by: NURSE PRACTITIONER

## 2017-12-04 PROCEDURE — 63710000001 INSULIN ASPART PER 5 UNITS: Performed by: INTERNAL MEDICINE

## 2017-12-04 PROCEDURE — 97162 PT EVAL MOD COMPLEX 30 MIN: CPT | Performed by: PHYSICAL THERAPIST

## 2017-12-04 PROCEDURE — 83735 ASSAY OF MAGNESIUM: CPT | Performed by: INTERNAL MEDICINE

## 2017-12-04 PROCEDURE — 82962 GLUCOSE BLOOD TEST: CPT

## 2017-12-04 PROCEDURE — 84100 ASSAY OF PHOSPHORUS: CPT | Performed by: INTERNAL MEDICINE

## 2017-12-04 PROCEDURE — 99253 IP/OBS CNSLTJ NEW/EST LOW 45: CPT | Performed by: INTERNAL MEDICINE

## 2017-12-04 PROCEDURE — 25010000002 HEPARIN (PORCINE) PER 1000 UNITS: Performed by: PSYCHIATRY & NEUROLOGY

## 2017-12-04 PROCEDURE — 80048 BASIC METABOLIC PNL TOTAL CA: CPT | Performed by: INTERNAL MEDICINE

## 2017-12-04 PROCEDURE — 85025 COMPLETE CBC W/AUTO DIFF WBC: CPT | Performed by: INTERNAL MEDICINE

## 2017-12-04 PROCEDURE — 97110 THERAPEUTIC EXERCISES: CPT | Performed by: PHYSICAL THERAPIST

## 2017-12-04 PROCEDURE — 63710000001 INSULIN DETEMER PER 5 UNITS: Performed by: INTERNAL MEDICINE

## 2017-12-04 RX ADMIN — INSULIN DETEMIR 15 UNITS: 100 INJECTION, SOLUTION SUBCUTANEOUS at 22:54

## 2017-12-04 RX ADMIN — BUPROPION HYDROCHLORIDE 150 MG: 150 TABLET, EXTENDED RELEASE ORAL at 08:18

## 2017-12-04 RX ADMIN — AMLODIPINE BESYLATE 10 MG: 10 TABLET ORAL at 08:18

## 2017-12-04 RX ADMIN — INSULIN ASPART 2 UNITS: 100 INJECTION, SOLUTION INTRAVENOUS; SUBCUTANEOUS at 17:41

## 2017-12-04 RX ADMIN — ATORVASTATIN CALCIUM 80 MG: 80 TABLET, FILM COATED ORAL at 08:18

## 2017-12-04 RX ADMIN — ASPIRIN 325 MG: 325 TABLET, COATED ORAL at 08:18

## 2017-12-04 RX ADMIN — DOCUSATE SODIUM 100 MG: 100 CAPSULE, LIQUID FILLED ORAL at 08:18

## 2017-12-04 RX ADMIN — HEPARIN SODIUM 5000 UNITS: 5000 INJECTION, SOLUTION INTRAVENOUS; SUBCUTANEOUS at 11:34

## 2017-12-04 RX ADMIN — INSULIN ASPART 2 UNITS: 100 INJECTION, SOLUTION INTRAVENOUS; SUBCUTANEOUS at 21:54

## 2017-12-04 RX ADMIN — DOCUSATE SODIUM 100 MG: 100 CAPSULE, LIQUID FILLED ORAL at 17:41

## 2017-12-04 RX ADMIN — METOPROLOL TARTRATE 100 MG: 100 TABLET, FILM COATED ORAL at 21:55

## 2017-12-04 RX ADMIN — HEPARIN SODIUM 5000 UNITS: 5000 INJECTION, SOLUTION INTRAVENOUS; SUBCUTANEOUS at 20:24

## 2017-12-04 RX ADMIN — HEPARIN SODIUM 5000 UNITS: 5000 INJECTION, SOLUTION INTRAVENOUS; SUBCUTANEOUS at 02:12

## 2017-12-04 RX ADMIN — INSULIN ASPART 4 UNITS: 100 INJECTION, SOLUTION INTRAVENOUS; SUBCUTANEOUS at 11:33

## 2017-12-04 RX ADMIN — METOPROLOL TARTRATE 100 MG: 100 TABLET, FILM COATED ORAL at 08:18

## 2017-12-04 NOTE — TELEPHONE ENCOUNTER
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 He has been sent a reminder, and has been placed in the book.   ThanksAlexandrea
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
## 2017-12-04 NOTE — PLAN OF CARE
Problem: Patient Care Overview (Adult)  Goal: Plan of Care Review    12/04/17 1330   Outcome Evaluation   Outcome Summary/Follow up Plan Pt admitted with AMS, N/V. Pt reports he is indendently mobile at baseline, but slightly unsteady when up to bathroom here. He believes he would walke better if he had his glasses and his wife is supposed to be bringing them today. Pt presents impaired balance and unsteady gait. Pt answered all questions appropriately, but was slow to answer and required cues and repetition at times to follow commands.Pt would benefit from PT to address these impairments and plans to go home at d/c.         Problem: Inpatient Physical Therapy  Goal: Bed Mobility Goal LTG- PT    12/04/17 1330   Bed Mobility PT LTG   Bed Mobility PT LTG, Date Established 12/04/17   Bed Mobility PT LTG, Time to Achieve 1 wk   Bed Mobility PT LTG, Activity Type all bed mobility   Bed Mobility PT LTG, [place] Level independent       Goal: Transfer Training Goal 1 LTG- PT    12/04/17 1330   Transfer Training PT LTG   Transfer Training PT LTG, Date Established 12/04/17   Transfer Training PT LTG, Time to Achieve 1 wk   Transfer Training PT LTG, Activity Type all transfers   Transfer Training PT LTG, [place] Level supervision required       Goal: Gait Training Goal LTG- PT    12/04/17 1330   Gait Training PT LTG   Gait Training Goal PT LTG, Date Established 12/04/17   Gait Training Goal PT LTG, Time to Achieve 1 wk   Gait Training Goal PT LTG, [place] Level supervision required   Gait Training Goal PT LTG, Distance to Achieve 300 ft       Goal: Stair Training Goal LTG- PT    12/04/17 1330   Stair Training PT LTG   Stair Training Goal PT LTG, Date Established 12/04/17   Stair Training Goal PT LTG, Time to Achieve 1 wk   Stair Training Goal PT LTG, Number of Steps 6   Stair Training Goal PT LTG, [place] Level contact guard assist   Stair Training Goal PT LTG, Assist Device 1 handrail       Goal: Patient  Education Goal LTG- PT    12/04/17 1330   Patient Education PT LTG   Patient Education PT LTG, Date Established 12/04/17   Patient Education PT LTG, Time to Achieve 1 wk   Patient Education PT LTG, Education Type HEP   Patient Education PT LTG, Education Understanding demonstrate adequately

## 2017-12-04 NOTE — DISCHARGE PLACEMENT REQUEST
"Carlito Mo (62 y.o. Male)     Date of Birth Social Security Number Address Home Phone MRN    1955  [id number] [address] [phone] [id number]    [place] Marital Status          Religious        Admission Date Admission Type Admitting Provider Attending Provider Department, Room/Bed    11/30/17 Emergency Brice Reza MD Jambeih, Rami, MD [address], 653/1    Discharge Date Discharge Disposition Discharge Destination                      Attending Provider: Brice Reza MD     Allergies:  Prozac [Fluoxetine Hcl]    Isolation:  None   Infection:  None   Code Status:  FULL    Ht:  72.99\" (185.4 cm)   Wt:  195 lb 1.6 oz (88.5 kg)    Admission Cmt:  None   Principal Problem:  None                Active Insurance as of 11/30/2017     Primary Coverage     Payor Plan Insurance Group Employer/Plan Group    [employer]HE[employer] MEDICAID AN[place] MEDICAID [id number]     Payor Plan Address Payor Plan Phone Number Effective From Effective To    [address] [address] [phone] 12/1/2015     [place] [address]       Subscriber Name Subscriber Birth Date Member ID       CARLITO MO 1955 [id number]                 Emergency Contacts      (Rel.) Home Phone Work Phone Mobile Phone    Lissette Mo (Spouse) [phone] -- --              "

## 2017-12-04 NOTE — NURSING NOTE
Awaiting PT/OT evals to assist in determining most appropriate level of rehab needed at discharge.   Will follow progress.     Lila Bentley RN  Acute Rehab Admission Nurse

## 2017-12-04 NOTE — PROGRESS NOTES
Discharge Planning Assessment  [facility]     Patient Name: Carlito Mo  MRN: [id number]  Today's Date: 12/4/2017    Admit Date: 11/30/2017          Discharge Needs Assessment       12/04/17 1012    Living Environment    Lives With spouse    Living Arrangements house    Home Accessibility no concerns    Stair Railings at Home none    Type of Financial/Environmental Concern none    Transportation Available car;family or friend will provide    Living Environment    Primary Care Provided By spouse/significant other    Quality Of Family Relationships supportive;helpful;involved    Able to Return to Prior Living Arrangements other (see comments)    Living Arrangement Comments Patient says he has not worked with PT and has only been to [facility] and back with assist.     Discharge Needs Assessment    Concerns To Be Addressed basic needs concerns;discharge planning concerns;home safety concerns    Readmission Within The Last 30 Days no previous admission in last 30 days    Community Agency Name(S) None    Anticipated Changes Related to Illness inability to work;inability to care for self    Equipment Currently Used at Home none    Equipment Needed After Discharge other (see comments)   Needs PT eval.    Discharge Facility/Level Of Care Needs other (see comments)   PT needs to eval for appropriate level of care.    Current Discharge Risk chronically ill;dependent with mobility/activities of daily living;physical impairment    Discharge Disposition still a patient    Discharge Contact Information if Applicable Lissette Mo wife [phone]    Discharge Planning Comments Undetermined - await PT eval            Discharge Plan       12/04/17 1020    Case Management/Social Work Plan    Plan Undetermined - await PT eval    Patient/Family In Agreement With Plan yes    Additional Comments Spoke with patient in room and with his permission his wife Sofiya [phone] by phone. They are both unsure of what level of care he will need. Patient has  not had PT/OT to see. Patient and wife are both agreeable to whatever level of care he will need. [facility] to [facility] for possible acute rehab need. Neither patient or wife have any choices for subacute rehab. [facility] left in room for wife to review if subacute needed. CCP will continue to follow and assist as needed. Patient will need [place] Medicaid precert. Confirmed face sheet correct. No IMM needed.         Discharge Placement     Facility/Agency Request Status Selected? Address Phone Number Fax Number     [facility] Pending - No Request Sent     [address] [place] [address] [phone]         Zia Garrett RN 12/4/2017 10:27    Called to [facility]                   Expected Discharge Date and Time     Expected Discharge Date Expected Discharge Time    Dec 5, 2017               Demographic Summary       12/04/17 1010    Referral Information    Admission Type inpatient    Arrived From admitted as an inpatient;home or self-care    Referral Source admission list    Reason For Consult discharge planning    Record Reviewed clinical discipline documentation;history and physical;medical record    Contact Information    Permission Granted to Share Information With ;family/designee    Comments Patient is agreeable to me speaking with his wife Lissette.    Primary Care Physician Information    Name Dr. García Rehman            Functional Status       12/04/17 1011    Functional Status Current    Ambulation 2-->assistive person    Transferring 2-->assistive person    Toileting 2-->assistive person    Bathing 2-->assistive person    Dressing 2-->assistive person    Eating 0-->independent    Communication 2-->difficulty speaking (not related to language barrier)   Patient says he sometimes forgets what he was saying while he is talking.    Swallowing (if score 2 or more for any item, consult Rehab Services) 0-->swallows foods/liquids without difficulty     Change in Functional Status Since Onset of Current Illness/Injury yes    Functional Status Prior    Ambulation 0-->independent    Transferring 0-->independent    Toileting 0-->independent    Bathing 0-->independent    Dressing 0-->independent    Eating 0-->independent    Communication 0-->understands/communicates without difficulty    Swallowing 0-->swallows foods/liquids without difficulty    Activity Tolerance    Usual Activity Tolerance good    Current Activity Tolerance moderate            Psychosocial     None            Abuse/Neglect     None            Legal     None            Substance Abuse     None            Patient Forms     None          Zia Garrett RN  Continued Stay Note  [facility]     Patient Name: Carlito Mo  MRN: [id number]  Today's Date: 12/4/2017    Admit Date: 11/30/2017          Discharge Plan       12/04/17 1020    Case Management/Social Work Plan    Plan Undetermined - await PT eval    Patient/Family In Agreement With Plan yes    Additional Comments Spoke with patient in room and with his permission his wife Sofiya [phone] by phone. They are both unsure of what level of care he will need. Patient has not had PT/OT to see. Patient and wife are both agreeable to whatever level of care he will need. [facility] for possible acute rehab need. Neither patient or wife have any choices for subacute rehab. [facility] left in room for wife to review if subacute needed. CCP will continue to follow and assist as needed. Patient will need [place] Medicaid precert. Confirmed face sheet correct. No IMM needed.               Discharge Codes     None        Expected Discharge Date and Time     Expected Discharge Date Expected Discharge Time    Dec 5, 2017             Zia Garrett RN

## 2017-12-04 NOTE — PLAN OF CARE
Problem: Fall Risk (Adult)  Goal: Absence of Falls  Outcome: Ongoing (interventions implemented as appropriate)    Problem: Pressure Ulcer Risk (Alex Scale) (Adult,Obstetrics,Pediatric)  Goal: Skin Integrity  Outcome: Ongoing (interventions implemented as appropriate)    Problem: Confusion, Acute (Adult)  Goal: Identify Related Risk Factors and Signs and Symptoms  Outcome: Ongoing (interventions implemented as appropriate)  Goal: Cognitive/Functional Impairments Minimized  Outcome: Ongoing (interventions implemented as appropriate)  Goal: Safety  Outcome: Ongoing (interventions implemented as appropriate)    Problem: Urine Elimination, Impaired (Adult)  Goal: Identify Related Risk Factors and Signs and Symptoms  Outcome: Ongoing (interventions implemented as appropriate)  Goal: Effective Urinary Elimination  Outcome: Ongoing (interventions implemented as appropriate)  Goal: Effective Containment of Urine  Outcome: Ongoing (interventions implemented as appropriate)  Goal: Reduced Incontinence Episodes  Outcome: Ongoing (interventions implemented as appropriate)    Problem: Anxiety (Adult)  Goal: Identify Related Risk Factors and Signs and Symptoms  Outcome: Ongoing (interventions implemented as appropriate)  Goal: Reduction/Resolution  Outcome: Ongoing (interventions implemented as appropriate)    Problem: Diabetes, Type 2 (Adult)  Goal: Signs and Symptoms of Listed Potential Problems Will be Absent or Manageable (Diabetes, Type 2)  Outcome: Ongoing (interventions implemented as appropriate)    Problem: Patient Care Overview (Adult)  Goal: Plan of Care Review  Outcome: Ongoing (interventions implemented as appropriate)    12/04/17 2   Coping/Psychosocial Response Interventions   Plan Of Care Reviewed With patient   Patient Care Overview   Progress improving   Outcome Evaluation   Outcome Summary/Follow up Plan Pt is alert & oriented x 4. No complaints at this time besides a sore throat. VS stable. Will continue  to monitor.        Goal: Adult Individualization and Mutuality  Outcome: Ongoing (interventions implemented as appropriate)  Goal: Discharge Needs Assessment  Outcome: Ongoing (interventions implemented as appropriate)

## 2017-12-04 NOTE — THERAPY EVALUATION
Acute Care - Physical Therapy Initial Evaluation  [facility]     Patient Name: Carlito Mo  : 1955  MRN: [id number]  Today's Date: 2017                Admit Date: 2017     Visit Dx:    ICD-10-CM ICD-9-CM   1. Altered mental status, unspecified altered mental status- Acute encephalopathy R41.82 780.97   2. Lactic acid acidosis E87.2 276.2   3. Vomiting without nausea, intractability of vomiting not specified, unspecified vomiting type R11.11 787.03   4. Hyperglycemia R73.9 790.29   5. Diarrhea, unspecified type R19.7 787.91     Patient Active Problem List   Diagnosis   • Depression   • Hyperlipidemia   • Chronic ischemic heart disease   • Vitamin D deficiency   • Erectile dysfunction   • Chronic fatigue   • Diabetes mellitus   • Skin lesion of chest wall   • Slow transit constipation   • Atherosclerosis of coronary artery   • Benign prostatic hyperplasia   • Chronic kidney disease   • History of basal cell carcinoma   • Essential hypertension   • Altered mental status     Past Medical History:   Diagnosis Date   • Acute sinusitis    • Anemia    • Arthritis    • Chronic ischemic heart disease    • Depression    • Diabetes mellitus type 2, uncontrolled, without complications    • Heart attack    • Hyperlipidemia    • Hypertension    • Screening for prostate cancer    • Type 2 diabetes mellitus    • Vitamin D deficiency      Past Surgical History:   Procedure Laterality Date   • APPENDECTOMY N/A 2016    Dr. Alexey Dodson   • CORONARY ARTERY BYPASS GRAFT  2001          PT ASSESSMENT (last 72 hours)      PT Evaluation       17 1325 17 1012    Rehab Evaluation    Document Type evaluation  -     Subjective Information agree to therapy;no complaints  -KH     Patient Effort, Rehab Treatment good  -     General Information    General Observations IN BED  -     Pertinent History Of Current Problem AMS, acute infarcts  -KH     Precautions/Limitations fall precautions  -      Prior Level of Function independent:  -     Equipment Currently Used at Home none  -KH none  -VN    Plans/Goals Discussed With patient  -     Living Environment    Lives With  spouse  -    Living Arrangements  house  -VN    Home Accessibility  no concerns  -VN    Stair Railings at Home  none  -VN    Type of Financial/Environmental Concern  none  -VN    Transportation Available  car;family or friend will provide  -VN    Clinical Impression    Criteria for Skilled Therapeutic Interventions Met treatment indicated  -     Rehab Potential good, to achieve stated therapy goals  -     Pain Assessment    Pain Assessment No/denies pain  -     Cognitive Assessment/Intervention    Current Cognitive/Communication Assessment impaired  -     Orientation Status oriented to;person;place;time  -     Follows Commands/Answers Questions 100% of the time  -     Personal Safety mild impairment  -     Personal Safety Interventions fall prevention program maintained;gait belt;nonskid shoes/slippers when out of bed  -     ROM (Range of Motion)    General ROM Detail WFL  -     MMT (Manual Muscle Testing)    General MMT Assessment Detail 4+/5  -     Bed Mobility, Assessment/Treatment    Bed Mob, Supine to Sit, [place] independent  -     Bed Mob, Sit to Supine, [place] not tested  -     Transfer Assessment/Treatment    Transfers, Sit-Stand [place] contact guard assist  -     Transfers, Stand-Sit [place] contact guard assist  -     Gait Assessment/Treatment    Gait, [place] Level contact guard assist;minimum assist (75% patient effort)  -     Gait, Distance (Feet) 150  -     Gait, Gait Deviations eli decreased;decreased heel strike;step length decreased  -     Gait, Safety Issues balance decreased during turns;step length decreased  -     Gait, Impairments strength decreased;impaired balance  -     Gait, Comment unsteady, LOB when turning corners, easily distracted with  varied pace and slightly LOB when looking at objects on desk or walls  -     Therapy Exercises    Bilateral Lower Extremities AROM:;10 reps;ankle pumps/circles;LAQ;hip flexion  -     Positioning and Restraints    Pre-Treatment Position in bed  -     Post Treatment Position bed  -     In Bed sitting EOB;call light within reach;encouraged to call for assist;notified nsg;with other staff   MD in room  -       12/02/17 1200       Rehab Evaluation    Document Type evaluation  -CP     Subjective Information no complaints  -CP     Cognitive Assessment/Intervention    Current Cognitive/Communication Assessment impaired   lethargic but cooperative as able  -CP       User Key  (r) = Recorded By, (t) = Taken By, (c) = Cosigned By    Initials Name Provider Type     Jody Burns, PT Physical Therapist    ORLIN Retana MS CCC-SLP Speech and Language Pathologist    ADIS Garrett, DEMAR Case Manager          Physical Therapy Education     Title: PT OT SLP Therapies (Active)     Topic: Physical Therapy (Active)     Point: Mobility training (Done)    Learning Progress Summary    Learner Readiness Method Response Comment Documented by Status   Patient Acceptance E JEAN,NR   12/04/17 1330 Done               Point: Home exercise program (Done)    Learning Progress Summary    Learner Readiness Method Response Comment Documented by Status   Patient Acceptance E ,NR   12/04/17 1330 Done               Point: Precautions (Done)    Learning Progress Summary    Learner Readiness Method Response Comment Documented by Status   Patient Acceptance E ,NR   12/04/17 1330 Done                      User Key     Initials Effective Dates Name Provider Type [facility] 05/18/15 -  Jody Burns, PT Physical Therapist PT                PT Recommendation and Plan  Anticipated Discharge Disposition: home with home health  Planned Therapy Interventions: balance training, bed mobility training, gait  training, home exercise program, patient/family education, strengthening, transfer training  PT Frequency: daily  Plan of Care Review  Outcome Summary/Follow up Plan: Pt admitted with AMS, N/V. Pt reports he is indendently mobile at baseline, but slightly unsteady when up to bathroom here. He believes he would walke better if he had his glasses and his wife is supposed to be bringing them today. Pt presents impaired balance and unsteady gait. Pt  answered all questions appropriately, but was slow to answer and required cues and repetition at times to follow commands.Pt would benefit from PT to address these impairments and plans to go home at d/c.          IP PT Goals       12/04/17 1330          Bed Mobility PT LTG    Bed Mobility PT LTG, Date Established 12/04/17  -KH      Bed Mobility PT LTG, Time to Achieve 1 wk  -KH      Bed Mobility PT LTG, Activity Type all bed mobility  -KH      Bed Mobility PT LTG, [place] Level independent  -KH      Transfer Training PT LTG    Transfer Training PT LTG, Date Established 12/04/17  -KH      Transfer Training PT LTG, Time to Achieve 1 wk  -KH      Transfer Training PT LTG, Activity Type all transfers  -KH      Transfer Training PT LTG, [place] Level supervision required  -KH      Gait Training PT LTG    Gait Training Goal PT LTG, Date Established 12/04/17  -KH      Gait Training Goal PT LTG, Time to Achieve 1 wk  -KH      Gait Training Goal PT LTG, [place] Level supervision required  -KH      Gait Training Goal PT LTG, Distance to Achieve 300 ft  -KH      Stair Training PT LTG    Stair Training Goal PT LTG, Date Established 12/04/17  -KH      Stair Training Goal PT LTG, Time to Achieve 1 wk  -KH      Stair Training Goal PT LTG, Number of Steps 6  -KH      Stair Training Goal PT LTG, [place] Level contact guard assist  -KH      Stair Training Goal PT LTG, Assist Device 1 handrail  -KH      Patient Education PT LTG    Patient Education PT LTG, Date  Established 12/04/17  -TONY      Patient Education PT LTG, Time to Achieve 1 wk  -TONY      Patient Education PT LTG, Education Type HEP  -      Patient Education PT LTG, Education Understanding demonstrate adequately  -        User Key  (r) = Recorded By, (t) = Taken By, (c) = Cosigned By    Initials Name Provider Type    TONY Burns, PT Physical Therapist                Outcome Measures       12/04/17 1336          How much help from another person do you currently need...    Turning from your back to your side while in flat bed without using bedrails? 4  -KH      Moving from lying on back to sitting on the side of a flat bed without bedrails? 4  -KH      Moving to and from a bed to a chair (including a wheelchair)? 3  -KH      Standing up from a chair using your arms (e.g., wheelchair, bedside chair)? 3  -KH      Climbing 3-5 steps with a railing? 2  -KH      To walk in hospital room? 3  -KH      AM-PAC 6 Clicks Score 19  -KH      Functional Assessment    Outcome Measure Options AM-PAC 6 Clicks Basic Mobility (PT)  -        User Key  (r) = Recorded By, (t) = Taken By, (c) = Cosigned By    Initials Name Provider Type    TONY Burns, DENISSE Physical Therapist           Time Calculation:         PT Charges       12/04/17 1336          Time Calculation    Start Time 1312  -      Stop Time 1325  -      Time Calculation (min) 13 min  -      PT Received On 12/04/17  -TONY      PT - Next Appointment 12/05/17  -TONY      PT Goal Re-Cert Due Date 12/11/17  -TONY        User Key  (r) = Recorded By, (t) = Taken By, (c) = Cosigned By    Initials Name Provider Type    TONY Burns, PT Physical Therapist          Therapy Charges for Today     Code Description Service Date Service Provider Modifiers Qty    95457134260 HC PT EVAL MOD COMPLEXITY 2 12/4/2017 Jody Burns, PT GP 1    02444714616 HC PT THER PROC EA 15 MIN 12/4/2017 Jody Burns, PT GP 1          PT  G-Codes  Outcome Measure Options: AM-PAC 6 Clicks Basic Mobility (PT)      Jody Burns, PT  12/4/2017

## 2017-12-04 NOTE — PROGRESS NOTES
[facility]     Mar/chart reviewed  F/u altered mental status.  Doing well no new complaints    Vital Sign Min/Max for last 24 hours  Temp  Min: 97.8 °F (36.6 °C)  Max: 98.4 °F (36.9 °C)   BP  Min: 125/72  Max: 148/76   Pulse  Min: 66  Max: 70   Resp  Min: 16  Max: 18   SpO2  Min: 93 %  Max: 96 %   No Data Recorded   Weight  Min: 192 lb 3.2 oz (87.2 kg)  Max: 195 lb 1.6 oz (88.5 kg)     Nad, axox3,   perrl, eomi, no icterus,  mmm, no jvd, trachea midline, neck supple,  chest cta bilaterally, no crackles, no wheezes,   rrr,   soft, nt, nd +bs,  no c/c/ 1+ edema    Labs:  Cr 1.4  Bicarb 24  Wbc 7.29  hgb 10.7  plts 213    A/P:  1. Altered mental status -- improved  2. Acute CVA -- likely cardioembolic -- needs NEISHA per neurology  3. CKD  4. RBBB  5. DMII -- on insulin; quite a bit of variablility  6. HTN -- bp okay now      Likely home when neuro work up complete

## 2017-12-04 NOTE — PROGRESS NOTES
"DOS: 2017  NAME: Carlito Mo   : 1955  PCP: García Rehman MD  Chief Complaint   Patient presents with   • Altered Mental Status   • Vomiting     CC: Stroke    Subjective: He denies any headache or any new stroke/TIA symptoms and basically is feeling back to his baseline.   Objective:  Vital signs: /76 (BP Location: Left arm, Patient Position: Lying)  Pulse 69  Temp 97.9 °F (36.6 °C) (Oral)   Resp 18  Ht 72.99\" (185.4 cm)  Wt 195 lb 1.6 oz (88.5 kg)  SpO2 94%  BMI 25.75 kg/m2      Physical Exam:  GENERAL: NAD  HEENT: Normocephalic, atraumatic   COR: RRR  Resp: Even and unlabored  Extremities: Equal pulses, no signs of distal embolization.     Neurological:   MS: AO. Language normal. No neglect. Higher integrative function normal  CN: II-XII normal  Motor: Normal strength and tone throughout.  Sensory: Intact  Coordination: Normal    Results Review:     I reviewed the patient's new clinical results.    Current Medications:    amLODIPine 10 mg Oral Q24H   aspirin  mg Oral Daily   atorvastatin 80 mg Oral Daily   buPROPion  mg Oral Daily   docusate sodium 100 mg Oral BID   heparin (porcine) 5,000 Units Subcutaneous Q8H   insulin aspart 0-9 Units Subcutaneous 4x Daily With Meals & Nightly   insulin detemir 15 Units Subcutaneous Nightly   metoprolol tartrate 100 mg Oral Q12H       hold 1 each       Medications Reviewed:    Laboratory results:  Lab Results   Component Value Date    GLUCOSE 69 2017    CALCIUM 8.5 (L) 2017     2017    K 3.5 2017    CO2 24.7 2017     2017    BUN 19 2017    CREATININE 1.40 (H) 2017    EGFRIFAFRI 66 2017    EGFRIFNONA 51 (L) 2017    BCR 13.6 2017    ANIONGAP 11.3 2017     Lab Results   Component Value Date    WBC 7.29 2017    HGB 10.7 (L) 2017    HCT 34.1 (L) 2017    MCV 83.8 2017     2017          Lab Results   Component Value Date    INR " 0.98 11/30/2017    INR 1.1 02/02/2016    PROTIME 12.6 11/30/2017    PROTIME 13.6 02/02/2016     Lab Results   Component Value Date    TSH 7.020 (H) 12/02/2017     No results found for: LDLCALC  Lab Results   Component Value Date    HGBA1C 12.79 (H) 03/23/2017     Blood Culture   Date Value Ref Range Status   12/03/2017 No growth at less than 24 hours  Preliminary   12/03/2017 No growth at less than 24 hours  Preliminary   12/01/2017 No growth at 3 days  Preliminary   11/30/2017 No growth at 3 days  Preliminary     Pain Management Panel     Pain Management Panel Latest Ref Rng & Units 11/30/2017    BARBITURATES SCREEN Negative Negative    BENZODIAZEPINE SCREEN, URINE Negative Negative    COCAINE SCREEN, URINE Negative Negative    METHADONE SCREEN, URINE Negative Negative        Lab Results   Component Value Date    SEDRATE 22 (H) 12/02/2017     Lab Results   Component Value Date    CRP 0.88 (H) 12/02/2017     LUMBAR PUNCTURE Results:   Lab Results   Component Value Date    COLORCSF Colorless 12/01/2017    APPEARCSF Clear 12/01/2017    RBCCSF 2 (H) 12/01/2017    TNUCCELLS 0 12/01/2017    PROTEINCSF 66.0 (H) 12/01/2017    GLUCCSF 112 (H) 12/01/2017     Lab Results   Component Value Date    CSFCX Culture in progress 12/01/2017     Blood Culture   Date Value Ref Range Status   12/03/2017 No growth at less than 24 hours  Preliminary   12/03/2017 No growth at less than 24 hours  Preliminary   12/01/2017 No growth at 3 days  Preliminary   11/30/2017 No growth at 3 days  Preliminary     Lab Results   Component Value Date    TSH 7.020 (H) 12/02/2017     Lab Results   Component Value Date    HSLIPNRC01 388 12/02/2017     No results found for: LDLCALC  LDL 3/23/17: 172    Review and interpretation of imaging: Per Dr. Wright,  CTA h/n 11/30/17: No significant stenosis  MRI brain 12/2/17: Multiple bilateral infarcts in both MCA distributions as well as ?small infarct in the left in left cerebellum.     TTE 12/3/17: LVEF 68%,  normal size, mild concentric hypertrophy, RV normal size; LA mildly dilated, saline tests negative for PFO; RA normal size; MV with myxomatous changes, annular calcification.     Impression: Mr. Mo is a 61 yo with HTN, DM and CAD who presented on 11/30 with two weeks of personality/behavioral changes that was thought to be encephalopathy. He was prophylactically placed on acyclovir and underwent an LP that was unremarkable. He has been on  mg since MI in 2011. He is followed by Dr. Hall. I reviewed his MRI with Dr. Wright which demonstrates bilateral strokes that are likely cardioembolic. Clinically he is back to his baseline, however, his ROS revealed h/o HA, fatigue and chills; however his blood cultures are negative thus far. Will consult cardiology for possible NEISHA. He will need aggressive control of risk factors including diabetes management (HgA1c 12. 59). Continue neurochecks. He will need PT/OT/ST eval. Continue ASA, Lipitor 80 mg.     Plan:  Consult cardiology, NEISHA  Diabetic educator  Aspirin 325mg  Lipitor 80 mg  Lipid panel  Hydrate  Neurochecks  Non-pharmacological DVT prophylaxis  EKG Tele  PT/OT/ST  Stroke Education  Blood pressure control to <130/80  Goal LDL <70-recommend high dose statins-   Serum glucose < 140    I have discussed the above with Dr. Wright, RN and patient.  Alisia Paula, APRN  12/04/17  9:45 AM      Active Problems:    Altered mental status

## 2017-12-04 NOTE — PLAN OF CARE
Problem: Pressure Ulcer Risk (Alex Scale) (Adult,Obstetrics,Pediatric)  Goal: Skin Integrity  Outcome: Ongoing (interventions implemented as appropriate)    Problem: Confusion, Acute (Adult)  Goal: Identify Related Risk Factors and Signs and Symptoms  Outcome: Ongoing (interventions implemented as appropriate)  Goal: Cognitive/Functional Impairments Minimized  Outcome: Ongoing (interventions implemented as appropriate)  Goal: Safety  Outcome: Ongoing (interventions implemented as appropriate)    Problem: Urine Elimination, Impaired (Adult)  Goal: Identify Related Risk Factors and Signs and Symptoms  Outcome: Ongoing (interventions implemented as appropriate)  Goal: Effective Urinary Elimination  Outcome: Ongoing (interventions implemented as appropriate)  Goal: Effective Containment of Urine  Outcome: Ongoing (interventions implemented as appropriate)  Goal: Reduced Incontinence Episodes  Outcome: Ongoing (interventions implemented as appropriate)    Problem: Anxiety (Adult)  Goal: Identify Related Risk Factors and Signs and Symptoms  Outcome: Ongoing (interventions implemented as appropriate)  Goal: Reduction/Resolution  Outcome: Ongoing (interventions implemented as appropriate)    Problem: Diabetes, Type 2 (Adult)  Goal: Signs and Symptoms of Listed Potential Problems Will be Absent or Manageable (Diabetes, Type 2)  Outcome: Ongoing (interventions implemented as appropriate)    Problem: Patient Care Overview (Adult)  Goal: Plan of Care Review  Outcome: Ongoing (interventions implemented as appropriate)    12/04/17 1636   Coping/Psychosocial Response Interventions   Plan Of Care Reviewed With patient   Outcome Evaluation   Outcome Summary/Follow up Plan pt denies pain, given chloraseptic for throat, given SSI, VSS, NIH stroke scale is 0 per 5 [place] nurse, sr on monitor,       Goal: Adult Individualization and Mutuality  Outcome: Ongoing (interventions implemented as appropriate)  Goal: Discharge Needs  Assessment  Outcome: Ongoing (interventions implemented as appropriate)

## 2017-12-04 NOTE — TELEPHONE ENCOUNTER
----- Message from Anju Landaverde RN sent at 12/4/2017  2:44 PM EST -----  Regarding: NEISHA tomorrow on 12/5/17  This inpatient needs a NEISHA done tomorrow on 12/5/17 to rule out source of stroke.     Orders are in Epic. Please let me know what time and with which Doctor. Thanks!    - Anju

## 2017-12-05 ENCOUNTER — APPOINTMENT (OUTPATIENT)
Dept: CARDIOLOGY | Facility: HOSPITAL | Age: 62
End: 2017-12-05
Attending: INTERNAL MEDICINE

## 2017-12-05 LAB
ALBUMIN CSF-MCNC: 48 MG/DL (ref 11–48)
ALBUMIN SERPL-MCNC: 2.8 G/DL (ref 3.6–4.8)
ANION GAP SERPL CALCULATED.3IONS-SCNC: 9.6 MMOL/L
BACTERIA SPEC AEROBE CULT: NORMAL
BASOPHILS # BLD AUTO: 0.01 10*3/MM3 (ref 0–0.2)
BASOPHILS NFR BLD AUTO: 0.2 % (ref 0–1.5)
BH CV ECHO MEAS - BSA(HAYCOCK): 2.1 M^2
BH CV ECHO MEAS - BSA: 2.1 M^2
BH CV ECHO MEAS - BZI_BMI: 24.4 KILOGRAMS/M^2
BH CV ECHO MEAS - BZI_METRIC_HEIGHT: 185.4 CM
BH CV ECHO MEAS - BZI_METRIC_WEIGHT: 83.9 KG
BH CV ECHO MEAS - EF(MOD-SP4): 45 %
BH CV VAS BP RIGHT ARM: NORMAL MMHG
BUN BLD-MCNC: 22 MG/DL (ref 8–23)
BUN/CREAT SERPL: 17.2 (ref 7–25)
CALCIUM SPEC-SCNC: 8.5 MG/DL (ref 8.6–10.5)
CHLORIDE SERPL-SCNC: 104 MMOL/L (ref 98–107)
CHOLEST SERPL-MCNC: 160 MG/DL (ref 0–200)
CO2 SERPL-SCNC: 24.4 MMOL/L (ref 22–29)
CREAT BLD-MCNC: 1.28 MG/DL (ref 0.76–1.27)
DEPRECATED RDW RBC AUTO: 41.1 FL (ref 37–54)
EOSINOPHIL # BLD AUTO: 0.28 10*3/MM3 (ref 0–0.7)
EOSINOPHIL NFR BLD AUTO: 4.3 % (ref 0.3–6.2)
ERYTHROCYTE [DISTWIDTH] IN BLOOD BY AUTOMATED COUNT: 13.5 % (ref 11.5–14.5)
FIBRINOGEN PPP-MCNC: 527 MG/DL (ref 219–464)
GFR SERPL CREATININE-BSD FRML MDRD: 57 ML/MIN/1.73
GLUCOSE BLD-MCNC: 133 MG/DL (ref 65–99)
GLUCOSE BLDC GLUCOMTR-MCNC: 140 MG/DL (ref 70–130)
GLUCOSE BLDC GLUCOMTR-MCNC: 178 MG/DL (ref 70–130)
GLUCOSE BLDC GLUCOMTR-MCNC: 289 MG/DL (ref 70–130)
GLUCOSE BLDC GLUCOMTR-MCNC: 299 MG/DL (ref 70–130)
HCT VFR BLD AUTO: 30.8 % (ref 40.4–52.2)
HDLC SERPL-MCNC: 45 MG/DL (ref 40–60)
HGB BLD-MCNC: 9.9 G/DL (ref 13.7–17.6)
IGG CSF-MCNC: 5.1 MG/DL (ref 0–8.6)
IGG SERPL-MCNC: 492 MG/DL (ref 700–1600)
IGG SYNTH RATE SER+CSF CALC-MRATE: 5.3 MG/DAY
IMM GRANULOCYTES # BLD: 0.02 10*3/MM3 (ref 0–0.03)
IMM GRANULOCYTES NFR BLD: 0.3 % (ref 0–0.5)
LDLC SERPL CALC-MCNC: 96 MG/DL (ref 0–100)
LDLC/HDLC SERPL: 2.13 {RATIO}
LYMPHOCYTES # BLD AUTO: 1.67 10*3/MM3 (ref 0.9–4.8)
LYMPHOCYTES NFR BLD AUTO: 25.8 % (ref 19.6–45.3)
MAGNESIUM SERPL-MCNC: 1.9 MG/DL (ref 1.6–2.4)
MCH RBC QN AUTO: 26.6 PG (ref 27–32.7)
MCHC RBC AUTO-ENTMCNC: 32.1 G/DL (ref 32.6–36.4)
MCV RBC AUTO: 82.8 FL (ref 79.8–96.2)
MONOCYTES # BLD AUTO: 0.52 10*3/MM3 (ref 0.2–1.2)
MONOCYTES NFR BLD AUTO: 8 % (ref 5–12)
NEUTROPHILS # BLD AUTO: 3.98 10*3/MM3 (ref 1.9–8.1)
NEUTROPHILS NFR BLD AUTO: 61.4 % (ref 42.7–76)
OLIGOCLONAL BANDS.IT SER+CSF QL: NORMAL
PHOSPHATE SERPL-MCNC: 3.2 MG/DL (ref 2.5–4.5)
PLATELET # BLD AUTO: 200 10*3/MM3 (ref 140–500)
PMV BLD AUTO: 11.1 FL (ref 6–12)
POTASSIUM BLD-SCNC: 3.6 MMOL/L (ref 3.5–5.2)
RBC # BLD AUTO: 3.72 10*6/MM3 (ref 4.6–6)
SODIUM BLD-SCNC: 138 MMOL/L (ref 136–145)
TRIGL SERPL-MCNC: 95 MG/DL (ref 0–150)
VLDLC SERPL-MCNC: 19 MG/DL (ref 5–40)
WBC NRBC COR # BLD: 6.48 10*3/MM3 (ref 4.5–10.7)

## 2017-12-05 PROCEDURE — 81240 F2 GENE: CPT | Performed by: NURSE PRACTITIONER

## 2017-12-05 PROCEDURE — 93325 DOPPLER ECHO COLOR FLOW MAPG: CPT

## 2017-12-05 PROCEDURE — 25010000002 FENTANYL CITRATE (PF) 100 MCG/2ML SOLUTION: Performed by: INTERNAL MEDICINE

## 2017-12-05 PROCEDURE — 85303 CLOT INHIBIT PROT C ACTIVITY: CPT | Performed by: NURSE PRACTITIONER

## 2017-12-05 PROCEDURE — 63710000001 INSULIN DETEMER PER 5 UNITS: Performed by: INTERNAL MEDICINE

## 2017-12-05 PROCEDURE — 85300 ANTITHROMBIN III ACTIVITY: CPT | Performed by: NURSE PRACTITIONER

## 2017-12-05 PROCEDURE — 97110 THERAPEUTIC EXERCISES: CPT

## 2017-12-05 PROCEDURE — 85384 FIBRINOGEN ACTIVITY: CPT | Performed by: NURSE PRACTITIONER

## 2017-12-05 PROCEDURE — 83735 ASSAY OF MAGNESIUM: CPT | Performed by: INTERNAL MEDICINE

## 2017-12-05 PROCEDURE — 81241 F5 GENE: CPT | Performed by: NURSE PRACTITIONER

## 2017-12-05 PROCEDURE — 82962 GLUCOSE BLOOD TEST: CPT

## 2017-12-05 PROCEDURE — 63710000001 INSULIN ASPART PER 5 UNITS: Performed by: INTERNAL MEDICINE

## 2017-12-05 PROCEDURE — 80048 BASIC METABOLIC PNL TOTAL CA: CPT | Performed by: INTERNAL MEDICINE

## 2017-12-05 PROCEDURE — 85613 RUSSELL VIPER VENOM DILUTED: CPT | Performed by: NURSE PRACTITIONER

## 2017-12-05 PROCEDURE — 85670 THROMBIN TIME PLASMA: CPT | Performed by: NURSE PRACTITIONER

## 2017-12-05 PROCEDURE — 80061 LIPID PANEL: CPT | Performed by: NURSE PRACTITIONER

## 2017-12-05 PROCEDURE — 84100 ASSAY OF PHOSPHORUS: CPT | Performed by: INTERNAL MEDICINE

## 2017-12-05 PROCEDURE — 85025 COMPLETE CBC W/AUTO DIFF WBC: CPT | Performed by: INTERNAL MEDICINE

## 2017-12-05 PROCEDURE — 85732 THROMBOPLASTIN TIME PARTIAL: CPT | Performed by: NURSE PRACTITIONER

## 2017-12-05 PROCEDURE — 93312 ECHO TRANSESOPHAGEAL: CPT

## 2017-12-05 PROCEDURE — 93325 DOPPLER ECHO COLOR FLOW MAPG: CPT | Performed by: INTERNAL MEDICINE

## 2017-12-05 PROCEDURE — 85705 THROMBOPLASTIN INHIBITION: CPT | Performed by: NURSE PRACTITIONER

## 2017-12-05 PROCEDURE — 83090 ASSAY OF HOMOCYSTEINE: CPT | Performed by: NURSE PRACTITIONER

## 2017-12-05 PROCEDURE — 25010000002 MIDAZOLAM PER 1 MG: Performed by: INTERNAL MEDICINE

## 2017-12-05 PROCEDURE — 25010000002 HEPARIN (PORCINE) PER 1000 UNITS: Performed by: PSYCHIATRY & NEUROLOGY

## 2017-12-05 PROCEDURE — 99233 SBSQ HOSP IP/OBS HIGH 50: CPT | Performed by: NURSE PRACTITIONER

## 2017-12-05 PROCEDURE — 93320 DOPPLER ECHO COMPLETE: CPT | Performed by: INTERNAL MEDICINE

## 2017-12-05 PROCEDURE — 93312 ECHO TRANSESOPHAGEAL: CPT | Performed by: INTERNAL MEDICINE

## 2017-12-05 PROCEDURE — 93320 DOPPLER ECHO COMPLETE: CPT

## 2017-12-05 PROCEDURE — 99152 MOD SED SAME PHYS/QHP 5/>YRS: CPT | Performed by: INTERNAL MEDICINE

## 2017-12-05 RX ORDER — SODIUM CHLORIDE 9 MG/ML
INJECTION, SOLUTION INTRAVENOUS
Status: COMPLETED | OUTPATIENT
Start: 2017-12-05 | End: 2017-12-05

## 2017-12-05 RX ORDER — MIDAZOLAM HYDROCHLORIDE 1 MG/ML
INJECTION INTRAMUSCULAR; INTRAVENOUS
Status: COMPLETED | OUTPATIENT
Start: 2017-12-05 | End: 2017-12-05

## 2017-12-05 RX ORDER — FENTANYL CITRATE 50 UG/ML
INJECTION, SOLUTION INTRAMUSCULAR; INTRAVENOUS
Status: COMPLETED | OUTPATIENT
Start: 2017-12-05 | End: 2017-12-05

## 2017-12-05 RX ADMIN — INSULIN ASPART 6 UNITS: 100 INJECTION, SOLUTION INTRAVENOUS; SUBCUTANEOUS at 11:31

## 2017-12-05 RX ADMIN — ATORVASTATIN CALCIUM 80 MG: 80 TABLET, FILM COATED ORAL at 09:21

## 2017-12-05 RX ADMIN — METOPROLOL TARTRATE 100 MG: 100 TABLET, FILM COATED ORAL at 09:21

## 2017-12-05 RX ADMIN — Medication 2 MG: at 07:31

## 2017-12-05 RX ADMIN — INSULIN DETEMIR 15 UNITS: 100 INJECTION, SOLUTION SUBCUTANEOUS at 21:26

## 2017-12-05 RX ADMIN — HEPARIN SODIUM 5000 UNITS: 5000 INJECTION, SOLUTION INTRAVENOUS; SUBCUTANEOUS at 11:32

## 2017-12-05 RX ADMIN — BENZOCAINE, BUTAMBEN, AND TETRACAINE HYDROCHLORIDE 2 SPRAY: .028; .004; .004 AEROSOL, SPRAY TOPICAL at 07:31

## 2017-12-05 RX ADMIN — AMLODIPINE BESYLATE 10 MG: 10 TABLET ORAL at 09:21

## 2017-12-05 RX ADMIN — FENTANYL CITRATE 25 MCG: 50 INJECTION INTRAMUSCULAR; INTRAVENOUS at 07:34

## 2017-12-05 RX ADMIN — FENTANYL CITRATE 25 MCG: 50 INJECTION INTRAMUSCULAR; INTRAVENOUS at 07:31

## 2017-12-05 RX ADMIN — HEPARIN SODIUM 5000 UNITS: 5000 INJECTION, SOLUTION INTRAVENOUS; SUBCUTANEOUS at 18:08

## 2017-12-05 RX ADMIN — BUPROPION HYDROCHLORIDE 150 MG: 150 TABLET, EXTENDED RELEASE ORAL at 09:21

## 2017-12-05 RX ADMIN — SODIUM CHLORIDE 50 ML/HR: 9 INJECTION, SOLUTION INTRAVENOUS at 07:21

## 2017-12-05 RX ADMIN — INSULIN ASPART 2 UNITS: 100 INJECTION, SOLUTION INTRAVENOUS; SUBCUTANEOUS at 18:08

## 2017-12-05 RX ADMIN — DOCUSATE SODIUM 100 MG: 100 CAPSULE, LIQUID FILLED ORAL at 18:08

## 2017-12-05 RX ADMIN — METOPROLOL TARTRATE 100 MG: 100 TABLET, FILM COATED ORAL at 21:25

## 2017-12-05 RX ADMIN — DOCUSATE SODIUM 100 MG: 100 CAPSULE, LIQUID FILLED ORAL at 09:21

## 2017-12-05 RX ADMIN — ASPIRIN 325 MG: 325 TABLET, COATED ORAL at 09:21

## 2017-12-05 RX ADMIN — Medication 1 MG: at 07:34

## 2017-12-05 RX ADMIN — INSULIN ASPART 6 UNITS: 100 INJECTION, SOLUTION INTRAVENOUS; SUBCUTANEOUS at 21:25

## 2017-12-05 NOTE — NURSING NOTE
Continuing to await OT eval to help determine rehab needs. Currently is ambulating 150' CG- min asst. Having blurred vision this afternoon so therapy unable to work with him. Will continue to follow progress to determine most appropriate level of rehab needed at time of dc    Lila Bentley RN  Acute Rehab Admission Nurse

## 2017-12-05 NOTE — PLAN OF CARE
Problem: Fall Risk (Adult)  Goal: Absence of Falls  Outcome: Ongoing (interventions implemented as appropriate)    Problem: Pressure Ulcer Risk (Alex Scale) (Adult,Obstetrics,Pediatric)  Goal: Skin Integrity  Outcome: Ongoing (interventions implemented as appropriate)    Problem: Confusion, Acute (Adult)  Goal: Identify Related Risk Factors and Signs and Symptoms  Outcome: Ongoing (interventions implemented as appropriate)    Problem: Anxiety (Adult)  Goal: Identify Related Risk Factors and Signs and Symptoms  Outcome: Ongoing (interventions implemented as appropriate)    Problem: Diabetes, Type 2 (Adult)  Goal: Signs and Symptoms of Listed Potential Problems Will be Absent or Manageable (Diabetes, Type 2)  Outcome: Ongoing (interventions implemented as appropriate)    Problem: Patient Care Overview (Adult)  Goal: Plan of Care Review  Outcome: Ongoing (interventions implemented as appropriate)    12/05/17 0310   Coping/Psychosocial Response Interventions   Plan Of Care Reviewed With patient   Patient Care Overview   Progress improving   Outcome Evaluation   Outcome Summary/Follow up Plan Patient rested well throughout the night. NIH stroke score 0 during shift. Patient pleasant, cooperative and calm on shift. Patient has been NPO since midnight for NEISHA in the AM; consent signed on chart. WIll continue to monitor.

## 2017-12-05 NOTE — PROGRESS NOTES
"DOS: 2017  NAME: Carlito Mo   : 1955  PCP: García Rehman MD  Chief Complaint   Patient presents with   • Altered Mental Status   • Vomiting     CC: Stroke    Subjective:  Patient reports bilateral blurred vision after having his NEISHA this am. Patient received Precedex and for procedural sedation and reports that he got his glasses back today after being without them for three days.  He denies any headache or any other stroke/TIA or concerning symptoms although he does report a sore throat . Uvula enlarged on exam and visualized on imaging . Will have radiology review scans to advise.     Objective:  Vital signs: /72 (BP Location: Left arm, Patient Position: Sitting)  Pulse 62  Temp 97.9 °F (36.6 °C) (Oral)   Resp 16  Ht 185.4 cm (72.99\")  Wt 90 kg (198 lb 6.4 oz)  SpO2 93%  BMI 26.18 kg/m2      Physical Exam:  GENERAL: NAD  HEENT: Normocephalic, atraumatic   COR: RRR  Resp: Even and unlabored  Extremities: Equal pulses, no signs of distal embolization.     Neurological:   MS: AO. Language normal. No neglect. Higher integrative function normal  CN: II-XII normal; blurred vision no difficulty with reading. No visual field deficits noted on exam.   Motor: Normal strength and tone throughout.  Sensory: Intact  Coordination: Normal  NIH 0     Results Review:     I reviewed the patient's new clinical results.    Current Medications:    amLODIPine 10 mg Oral Q24H   aspirin  mg Oral Daily   atorvastatin 80 mg Oral Daily   buPROPion  mg Oral Daily   docusate sodium 100 mg Oral BID   heparin (porcine) 5,000 Units Subcutaneous Q8H   insulin aspart 0-9 Units Subcutaneous 4x Daily With Meals & Nightly   insulin detemir 15 Units Subcutaneous Nightly   metoprolol tartrate 100 mg Oral Q12H       hold 1 each       Medications Reviewed:    Laboratory results:  Lab Results   Component Value Date    GLUCOSE 133 (H) 2017    CALCIUM 8.5 (L) 2017     2017    K 3.6 2017    " CO2 24.4 12/05/2017     12/05/2017    BUN 22 12/05/2017    CREATININE 1.28 (H) 12/05/2017    EGFRIFAFRI 66 03/23/2017    EGFRIFNONA 57 (L) 12/05/2017    BCR 17.2 12/05/2017    ANIONGAP 9.6 12/05/2017     Lab Results   Component Value Date    WBC 6.48 12/05/2017    HGB 9.9 (L) 12/05/2017    HCT 30.8 (L) 12/05/2017    MCV 82.8 12/05/2017     12/05/2017        Results from last 7 days  Lab Units 12/05/17  0634   CHOLESTEROL mg/dL 160     Lab Results   Component Value Date    INR 0.98 11/30/2017    INR 1.1 02/02/2016    PROTIME 12.6 11/30/2017    PROTIME 13.6 02/02/2016     Lab Results   Component Value Date    TSH 7.020 (H) 12/02/2017     Lab Results   Component Value Date    LDLCALC 96 12/05/2017     Lab Results   Component Value Date    HGBA1C 12.79 (H) 03/23/2017     Blood Culture   Date Value Ref Range Status   12/03/2017 No growth at less than 24 hours  Preliminary   12/03/2017 No growth at less than 24 hours  Preliminary   12/01/2017 No growth at 3 days  Preliminary   11/30/2017 No growth at 3 days  Preliminary     Pain Management Panel     Pain Management Panel Latest Ref Rng & Units 11/30/2017    BARBITURATES SCREEN Negative Negative    BENZODIAZEPINE SCREEN, URINE Negative Negative    COCAINE SCREEN, URINE Negative Negative    METHADONE SCREEN, URINE Negative Negative        Lab Results   Component Value Date    SEDRATE 22 (H) 12/02/2017     Lab Results   Component Value Date    CRP 0.88 (H) 12/02/2017     LUMBAR PUNCTURE Results:   Lab Results   Component Value Date    COLORCSF Colorless 12/01/2017    APPEARCSF Clear 12/01/2017    RBCCSF 2 (H) 12/01/2017    TNUCCELLS 0 12/01/2017    PROTEINCSF 66.0 (H) 12/01/2017    GLUCCSF 112 (H) 12/01/2017     Lab Results   Component Value Date    CSFCX No growth at 3 days 12/01/2017    BARTQIGM Non Reactive 12/01/2017     Blood Culture   Date Value Ref Range Status   12/03/2017 No growth at less than 24 hours  Preliminary   12/03/2017 No growth at less than  24 hours  Preliminary   12/01/2017 No growth at 3 days  Preliminary   11/30/2017 No growth at 3 days  Preliminary     Lab Results   Component Value Date    TSH 7.020 (H) 12/02/2017     Lab Results   Component Value Date    WDZFRCTL90 388 12/02/2017     Lab Results   Component Value Date    LDLCALC 96 12/05/2017     This SmartLink has not been configured with any valid records.       Review and interpretation of imaging: Per Dr. Wright,  CTA h/n 11/30/17: No significant stenosis  MRI brain 12/2/17: Multiple bilateral infarcts in both MCA distributions as well as ?small infarct in the left in left cerebellum.     TTE 12/3/17: LVEF 68%, normal size, mild concentric hypertrophy, RV normal size; LA mildly dilated, saline tests negative for PFO; RA normal size; MV with myxomatous changes, annular calcification.     NEISHA: 12/5/2017    Left Ventricle  Left ventricular systolic function is mildly decreased. Calculated EF = 45%. Normal left ventricular cavity size and wall thickness noted. Global left ventricular wall motion appears abnormal. Left ventricular diastolic dysfunction is noted.      Right Ventricle  Normal right ventricular cavity size, wall thickness, systolic function and septal motion noted.     Left Atrium  Left atrial cavity size is moderately dilated. No evidence of a left atrial thrombus present. Doppler interrogation shows mildly reduced flow within the left atrial appendage. No evidence of a left atrial appendage thrombus was present. No interatrial septal aneurysm present. Small patent foramen ovale present.     Right Atrium  Normal right atrial size noted.     Aortic Valve  The aortic valve is abnormal in structure. There is mild calcification of the aortic valve.No aortic valve regurgitation is present. No aortic valve stenosis is present.     Mitral Valve  The mitral valve is abnormal in structure. There is moderate bileaflet thickening present. Mild mitral valve regurgitation is present. No  significant mitral valve stenosis is present.     QTC > 511      Impression: Mr. Mo is a 63 yo with HTN, DM and CAD who presented on 11/30 with two weeks of personality/behavioral changes that was thought to be encephalopathy; LP and blood cultures negative. Today I reviewed his MRI with Dr. Metzger today who agrees that this event could be cardioembolic in nature; NEISHA done today. NEISHA revealed moderate left atrial dilation and small patent foramen ovale. We recommend hypercoagulable work-up and Zio patch placed at discharge given the increased risk for a-fib with LV dilation and QTC > 480.      Clinically he is back to his baseline, however, his ROS revealed blurry vision since NEISHA with a non-focal exam. He will need aggressive control of risk factors including diabetes management (HgA1c 12. 59). Continue neurochecks. He will need PT/OT/ST eval. Continue ASA, Lipitor 80 mg.       Dr. Aldana reviewed MRI where questionable calcification were seen on independent viewing of imaging (not mentioned in final report). Jovan Masters clarified that these are tonsilloliths; common benign clustered calcifications.     Plan:  Prolonged cardiac monitor at discharge; Cardiology to read/treat   Hypercoagulable labs; call Neuro office in 10-14 days for results   Diabetic educator  Aspirin 325mg  Lipitor 80 mg  Hydrate  Neurochecks  Non-pharmacological DVT prophylaxis  EKG Tele  PT/OT/ST  Stroke Education  Blood pressure control to <130/80  Goal LDL <70-recommend high dose statins-   Serum glucose < 140  Follow up with Sandrine/Alisia in clinic in 3 months   I have discussed the above with Dr. Metzger, RN and patient.    Kae Bunch, APRN  12/05/17  3:05 PM      Active Problems:    Altered mental status

## 2017-12-05 NOTE — PLAN OF CARE
Problem: Pressure Ulcer Risk (Alex Scale) (Adult,Obstetrics,Pediatric)  Goal: Skin Integrity  Outcome: Ongoing (interventions implemented as appropriate)    Problem: Confusion, Acute (Adult)  Goal: Identify Related Risk Factors and Signs and Symptoms  Outcome: Ongoing (interventions implemented as appropriate)  Goal: Cognitive/Functional Impairments Minimized  Outcome: Ongoing (interventions implemented as appropriate)  Goal: Safety  Outcome: Ongoing (interventions implemented as appropriate)    Problem: Urine Elimination, Impaired (Adult)  Goal: Identify Related Risk Factors and Signs and Symptoms  Outcome: Ongoing (interventions implemented as appropriate)  Goal: Effective Urinary Elimination  Outcome: Ongoing (interventions implemented as appropriate)  Goal: Effective Containment of Urine  Outcome: Ongoing (interventions implemented as appropriate)  Goal: Reduced Incontinence Episodes  Outcome: Ongoing (interventions implemented as appropriate)    Problem: Anxiety (Adult)  Goal: Identify Related Risk Factors and Signs and Symptoms  Outcome: Ongoing (interventions implemented as appropriate)  Goal: Reduction/Resolution  Outcome: Ongoing (interventions implemented as appropriate)    Problem: Diabetes, Type 2 (Adult)  Goal: Signs and Symptoms of Listed Potential Problems Will be Absent or Manageable (Diabetes, Type 2)  Outcome: Ongoing (interventions implemented as appropriate)    Problem: Patient Care Overview (Adult)  Goal: Plan of Care Review  Outcome: Ongoing (interventions implemented as appropriate)    12/05/17 1703   Coping/Psychosocial Response Interventions   Plan Of Care Reviewed With patient   Outcome Evaluation   Outcome Summary/Follow up Plan pt denies pain, after NEISHA complaining of blurred vision, MD aware, VSS, given ssi, will continue to monitor.       Goal: Adult Individualization and Mutuality  Outcome: Ongoing (interventions implemented as appropriate)  Goal: Discharge Needs Assessment  Outcome:  Ongoing (interventions implemented as appropriate)

## 2017-12-05 NOTE — THERAPY TREATMENT NOTE
Acute Care - Physical Therapy Treatment Note  [facility]     Patient Name: Carlito Mo  : 1955  MRN: [id number]  Today's Date: 2017             Admit Date: 2017    Visit Dx:    ICD-10-CM ICD-9-CM   1. Altered mental status, unspecified altered mental status- Acute encephalopathy R41.82 780.97   2. Lactic acid acidosis E87.2 276.2   3. Vomiting without nausea, intractability of vomiting not specified, unspecified vomiting type R11.11 787.03   4. Hyperglycemia R73.9 790.29   5. Diarrhea, unspecified type R19.7 787.91     Patient Active Problem List   Diagnosis   • Depression   • Hyperlipidemia   • Chronic ischemic heart disease   • Vitamin D deficiency   • Erectile dysfunction   • Chronic fatigue   • Diabetes mellitus   • Skin lesion of chest wall   • Slow transit constipation   • Atherosclerosis of coronary artery   • Benign prostatic hyperplasia   • Chronic kidney disease   • History of basal cell carcinoma   • Essential hypertension   • Altered mental status               Adult Rehabilitation Note       17 1511          Rehab Assessment/Intervention    Discipline physical therapist  -MA      Document Type therapy note (daily note)  -MA      Subjective Information agree to therapy   blurred vision today, nsg aware  -MA      Patient Effort, Rehab Treatment good  -MA      Precautions/Limitations fall precautions  -MA      Recorded by [MA] Reina Robles, PT      Pain Assessment    Pain Assessment No/denies pain  -MA      Recorded by [MA] Reina Robles, PT      Vision Assessment/Intervention    Visual Impairment blurred  -MA      Recorded by [MA] Reina Robles, PT      Cognitive Assessment/Intervention    Current Cognitive/Communication Assessment impaired  -MA      Orientation Status oriented to;person;place;time  -MA      Follows Commands/Answers Questions 100% of the time;able to follow single-step instructions;needs cueing;needs increased time  -MA      Personal Safety mild  impairment  -MA      Personal Safety Interventions fall prevention program maintained;gait belt;nonskid shoes/slippers when out of bed  -MA      Recorded by [MA] Reina Robles PT      Bed Mobility, Assessment/Treatment    Bed Mob, Supine to Sit, [place] independent  -MA      Bed Mob, Sit to Supine, [place] independent  -MA      Recorded by [MA] Reina Robles PT      Transfer Assessment/Treatment    Transfers, Sit-Stand [place] supervision required  -MA      Transfers, Stand-Sit [place] supervision required  -MA      Transfers, Sit-Stand-Sit, Assist Device --   no AD  -MA      Recorded by [MA] Reina Robles PT      Gait Assessment/Treatment    Gait, [place] Level contact guard assist;minimum assist (75% patient effort)  -MA      Gait, Assistive Device --   no AD  -MA      Gait, Distance (Feet) 150  -MA      Gait, Gait Pattern Analysis swing-through gait  -MA      Gait, Gait Deviations eli decreased;stride length decreased;step length decreased  -MA      Gait, Safety Issues balance decreased during turns  -MA      Gait, Impairments strength decreased;impaired balance  -MA      Gait, Comment slightly unsteady, occasionally required [place] to avoid obstacles in path. Blurred vision limiting further ambulation.  -MA      Recorded by [MA] Reina Robles PT      Therapy Exercises    Bilateral Lower Extremities AROM:;10 reps;ankle pumps/circles;LAQ;hip flexion  -MA      Recorded by [MA] Reina Robles PT      Positioning and Restraints    Pre-Treatment Position in bed  -MA      Post Treatment Position bed  -MA      In Bed notified nsg;supine;call light within reach;encouraged to call for assist  -MA      Recorded by [MA] Reina Robles PT        User Key  (r) = Recorded By, (t) = Taken By, (c) = Cosigned By    Initials Name Effective Dates    THOMAS Robles, PT 12/13/16 -                 IP PT Goals       12/04/17 1330          Bed Mobility PT LTG    Bed  Mobility PT LTG, Date Established 12/04/17  -KH      Bed Mobility PT LTG, Time to Achieve 1 wk  -KH      Bed Mobility PT LTG, Activity Type all bed mobility  -KH      Bed Mobility PT LTG, [place] Level independent  -KH      Transfer Training PT LTG    Transfer Training PT LTG, Date Established 12/04/17  -KH      Transfer Training PT LTG, Time to Achieve 1 wk  -KH      Transfer Training PT LTG, Activity Type all transfers  -KH      Transfer Training PT LTG, [place] Level supervision required  -KH      Gait Training PT LTG    Gait Training Goal PT LTG, Date Established 12/04/17  -KH      Gait Training Goal PT LTG, Time to Achieve 1 wk  -KH      Gait Training Goal PT LTG, [place] Level supervision required  -KH      Gait Training Goal PT LTG, Distance to Achieve 300 ft  -KH      Stair Training PT LTG    Stair Training Goal PT LTG, Date Established 12/04/17  -KH      Stair Training Goal PT LTG, Time to Achieve 1 wk  -KH      Stair Training Goal PT LTG, Number of Steps 6  -KH      Stair Training Goal PT LTG, [place] Level contact guard assist  -KH      Stair Training Goal PT LTG, Assist Device 1 handrail  -KH      Patient Education PT LTG    Patient Education PT LTG, Date Established 12/04/17  -      Patient Education PT LTG, Time to Achieve 1 wk  -KH      Patient Education PT LTG, Education Type HEP  -KH      Patient Education PT LTG, Education Understanding demonstrate adequately  -KH        User Key  (r) = Recorded By, (t) = Taken By, (c) = Cosigned By    Initials Name Provider Type    TONY Burns, PT Physical Therapist          Physical Therapy Education     Title: PT OT SLP Therapies (Active)     Topic: Physical Therapy (Active)     Point: Mobility training (Done)    Learning Progress Summary    Learner Readiness Method Response Comment Documented by Status   Patient Acceptance E VU  MA 12/05/17 1529 Done    Acceptance E RYANNE PENA 12/04/17 1330 Done               Point: Home  exercise program (Done)    Learning Progress Summary    Learner Readiness Method Response Comment Documented by Status   Patient Acceptance E VU  MA 12/05/17 1529 Done    Acceptance E VU,NR   12/04/17 1330 Done               Point: Precautions (Done)    Learning Progress Summary    Learner Readiness Method Response Comment Documented by Status   Patient Acceptance E VU  MA 12/05/17 1529 Done    Acceptance E VU,NR   12/04/17 1330 Done                      User Key     Initials Effective Dates Name Provider Type Discipline     05/18/15 -  Jody Burns, PT Physical Therapist PT    MA 12/13/16 -  Reina Robles, PT Physical Therapist PT                    PT Recommendation and Plan  Anticipated Discharge Disposition: home with home health  Planned Therapy Interventions: balance training, bed mobility training, gait training, home exercise program, patient/family education, strengthening, transfer training  PT Frequency: daily  Plan of Care Review  Plan Of Care Reviewed With: patient  Outcome Summary/Follow up Plan: Complaint of blurred vision today but agreeable to PT. Ambulated 150' with no AD. Will continue to progress as able. Will plan to practice stairs tomorrow if blurred vision subsided.          Outcome Measures       12/05/17 1500 12/04/17 1336       How much help from another person do you currently need...    Turning from your back to your side while in flat bed without using bedrails? 4  -MA 4  -KH     Moving from lying on back to sitting on the side of a flat bed without bedrails? 4  -MA 4  -KH     Moving to and from a bed to a chair (including a wheelchair)? 3  -MA 3  -KH     Standing up from a chair using your arms (e.g., wheelchair, bedside chair)? 3  -MA 3  -KH     Climbing 3-5 steps with a railing? 2  -MA 2  -KH     To walk in hospital room? 3  -MA 3  -KH     AM-PAC 6 Clicks Score 19  -MA 19  -KH     Functional Assessment    Outcome Measure Options AM-PAC 6 Clicks Basic Mobility  (PT)  -MA AM-PAC 6 Clicks Basic Mobility (PT)  -TONY       User Key  (r) = Recorded By, (t) = Taken By, (c) = Cosigned By    Initials Name Provider Type    TONY Burns, PT Physical Therapist    THOMAS Robles PT Physical Therapist           Time Calculation:         PT Charges       12/05/17 1529          Time Calculation    Start Time 1511  -MA      Stop Time 1529  -MA      Time Calculation (min) 18 min  -MA      PT Received On 12/05/17  -MA      PT - Next Appointment 12/06/17  -MA        User Key  (r) = Recorded By, (t) = Taken By, (c) = Cosigned By    Initials Name Provider Type    THOMAS Robles PT Physical Therapist          Therapy Charges for Today     Code Description Service Date Service Provider Modifiers Qty    [id number] HC PT THER PROC EA 15 MIN 12/5/2017 Reina Robles, PT GP 1          PT G-Codes  Outcome Measure Options: AM-PAC 6 Clicks Basic Mobility (PT)    Reina Robles PT  12/5/2017

## 2017-12-05 NOTE — PROGRESS NOTES
[facility]      Mar/chart reviewed  F/u altered mental status.  Doing well no new complaints  S/p NEISHA    Vital Sign Min/Max for last 24 hours  Temp  Min: 97.7 °F (36.5 °C)  Max: 98 °F (36.7 °C)   BP  Min: 129/68  Max: 168/88   Pulse  Min: 60  Max: 68   Resp  Min: 14  Max: 18   SpO2  Min: 92 %  Max: 99 %   No Data Recorded   Weight  Min: 90 kg (198 lb 6.4 oz)  Max: 90 kg (198 lb 6.4 oz)     Nad, axox3,   perrl, eomi, no icterus,  mmm, no jvd, trachea midline, neck supple,  chest cta bilaterally, no crackles, no wheezes,   rrr,   soft, nt, nd +bs,  no c/c/ 1+ edema     Labs;  Cr 1.28    NEISHA: report reviewed; small pfo ef 45%    A/P:  1. Altered mental status -- improved  2. Acute CVA -- likely cardioembolic -- s/p NEISHA   3. CKD  4. RBBB  5. DMII -- on insulin; quite a bit of variablility  6. HTN -- bp okay now        Likely home when neuro work up complete

## 2017-12-05 NOTE — PLAN OF CARE
Problem: Patient Care Overview (Adult)  Goal: Plan of Care Review    12/05/17 1528   Coping/Psychosocial Response Interventions   Plan Of Care Reviewed With patient   Outcome Evaluation   Outcome Summary/Follow up Plan Complaint of blurred vision today but agreeable to PT. Ambulated 150' with no AD. Will continue to progress as able. Will plan to practice stairs tomorrow if blurred vision subsided.

## 2017-12-05 NOTE — SIGNIFICANT NOTE
12/05/17 1342   Rehab Treatment   Discipline occupational therapist   Rehab Evaluation   Evaluation Not Performed other (see comments)  (Discussed with nurse and pt spouse, nsg and spouse states pt with change in vision since NEISHA. Request hold OT at this time.  1340)

## 2017-12-05 NOTE — SIGNIFICANT NOTE
12/05/17 0927   Rehab Treatment   Discipline occupational therapist   Rehab Evaluation   Evaluation Not Performed other (see comments)  (Just returned from NEISHA per nsg. Will follow up at later time today. 927)

## 2017-12-06 VITALS
TEMPERATURE: 97.8 F | SYSTOLIC BLOOD PRESSURE: 118 MMHG | WEIGHT: 198.4 LBS | OXYGEN SATURATION: 97 % | HEIGHT: 73 IN | HEART RATE: 66 BPM | BODY MASS INDEX: 26.29 KG/M2 | RESPIRATION RATE: 16 BRPM | DIASTOLIC BLOOD PRESSURE: 72 MMHG

## 2017-12-06 PROBLEM — R41.82 ALTERED MENTAL STATUS: Status: RESOLVED | Noted: 2017-11-30 | Resolved: 2017-12-06

## 2017-12-06 LAB
ANION GAP SERPL CALCULATED.3IONS-SCNC: 9.5 MMOL/L
BACTERIA SPEC AEROBE CULT: NORMAL
BASOPHILS # BLD AUTO: 0 10*3/MM3 (ref 0–0.2)
BASOPHILS NFR BLD AUTO: 0 % (ref 0–1.5)
BUN BLD-MCNC: 24 MG/DL (ref 8–23)
BUN/CREAT SERPL: 17.8 (ref 7–25)
CALCIUM SPEC-SCNC: 8.5 MG/DL (ref 8.6–10.5)
CARDIOLIPIN IGG SER IA-ACNC: <9 GPL U/ML (ref 0–14)
CARDIOLIPIN IGM SER IA-ACNC: <9 MPL U/ML (ref 0–12)
CHLORIDE SERPL-SCNC: 105 MMOL/L (ref 98–107)
CO2 SERPL-SCNC: 26.5 MMOL/L (ref 22–29)
CREAT BLD-MCNC: 1.35 MG/DL (ref 0.76–1.27)
CREAT BLDA-MCNC: 1 MG/DL (ref 0.6–1.3)
DEPRECATED RDW RBC AUTO: 40.4 FL (ref 37–54)
EOSINOPHIL # BLD AUTO: 0.24 10*3/MM3 (ref 0–0.7)
EOSINOPHIL NFR BLD AUTO: 3.8 % (ref 0.3–6.2)
ERYTHROCYTE [DISTWIDTH] IN BLOOD BY AUTOMATED COUNT: 13.4 % (ref 11.5–14.5)
GFR SERPL CREATININE-BSD FRML MDRD: 54 ML/MIN/1.73
GLUCOSE BLD-MCNC: 98 MG/DL (ref 65–99)
GLUCOSE BLDC GLUCOMTR-MCNC: 100 MG/DL (ref 70–130)
GLUCOSE BLDC GLUCOMTR-MCNC: 187 MG/DL (ref 70–130)
GLUCOSE BLDC GLUCOMTR-MCNC: 315 MG/DL (ref 70–130)
HCT VFR BLD AUTO: 30.7 % (ref 40.4–52.2)
HGB BLD-MCNC: 9.7 G/DL (ref 13.7–17.6)
IMM GRANULOCYTES # BLD: 0 10*3/MM3 (ref 0–0.03)
IMM GRANULOCYTES NFR BLD: 0 % (ref 0–0.5)
LYMPHOCYTES # BLD AUTO: 1.63 10*3/MM3 (ref 0.9–4.8)
LYMPHOCYTES NFR BLD AUTO: 25.8 % (ref 19.6–45.3)
MAGNESIUM SERPL-MCNC: 1.8 MG/DL (ref 1.6–2.4)
MCH RBC QN AUTO: 26.1 PG (ref 27–32.7)
MCHC RBC AUTO-ENTMCNC: 31.6 G/DL (ref 32.6–36.4)
MCV RBC AUTO: 82.5 FL (ref 79.8–96.2)
MONOCYTES # BLD AUTO: 0.52 10*3/MM3 (ref 0.2–1.2)
MONOCYTES NFR BLD AUTO: 8.2 % (ref 5–12)
NEUTROPHILS # BLD AUTO: 3.92 10*3/MM3 (ref 1.9–8.1)
NEUTROPHILS NFR BLD AUTO: 62.2 % (ref 42.7–76)
PHOSPHATE SERPL-MCNC: 4 MG/DL (ref 2.5–4.5)
PLATELET # BLD AUTO: 219 10*3/MM3 (ref 140–500)
PMV BLD AUTO: 11.5 FL (ref 6–12)
POTASSIUM BLD-SCNC: 3.9 MMOL/L (ref 3.5–5.2)
RBC # BLD AUTO: 3.72 10*6/MM3 (ref 4.6–6)
SODIUM BLD-SCNC: 141 MMOL/L (ref 136–145)
WBC NRBC COR # BLD: 6.31 10*3/MM3 (ref 4.5–10.7)

## 2017-12-06 PROCEDURE — 83735 ASSAY OF MAGNESIUM: CPT | Performed by: INTERNAL MEDICINE

## 2017-12-06 PROCEDURE — 84100 ASSAY OF PHOSPHORUS: CPT | Performed by: INTERNAL MEDICINE

## 2017-12-06 PROCEDURE — 92523 SPEECH SOUND LANG COMPREHEN: CPT

## 2017-12-06 PROCEDURE — 85025 COMPLETE CBC W/AUTO DIFF WBC: CPT | Performed by: INTERNAL MEDICINE

## 2017-12-06 PROCEDURE — 80048 BASIC METABOLIC PNL TOTAL CA: CPT | Performed by: INTERNAL MEDICINE

## 2017-12-06 PROCEDURE — 63710000001 INSULIN ASPART PER 5 UNITS: Performed by: INTERNAL MEDICINE

## 2017-12-06 PROCEDURE — 90661 CCIIV3 VAC ABX FR 0.5 ML IM: CPT | Performed by: INTERNAL MEDICINE

## 2017-12-06 PROCEDURE — 97110 THERAPEUTIC EXERCISES: CPT

## 2017-12-06 PROCEDURE — 82962 GLUCOSE BLOOD TEST: CPT

## 2017-12-06 PROCEDURE — G0008 ADMIN INFLUENZA VIRUS VAC: HCPCS | Performed by: INTERNAL MEDICINE

## 2017-12-06 PROCEDURE — 25010000002 HEPARIN (PORCINE) PER 1000 UNITS: Performed by: PSYCHIATRY & NEUROLOGY

## 2017-12-06 PROCEDURE — 25010000002 INFLUENZA VAC SUBUNIT QUAD 0.5 ML SUSPENSION PREFILLED SYRINGE: Performed by: INTERNAL MEDICINE

## 2017-12-06 RX ORDER — METOPROLOL TARTRATE 100 MG/1
100 TABLET ORAL EVERY 12 HOURS SCHEDULED
Start: 2017-12-06 | End: 2018-08-09

## 2017-12-06 RX ADMIN — ATORVASTATIN CALCIUM 80 MG: 80 TABLET, FILM COATED ORAL at 09:45

## 2017-12-06 RX ADMIN — AMLODIPINE BESYLATE 10 MG: 10 TABLET ORAL at 09:46

## 2017-12-06 RX ADMIN — ASPIRIN 325 MG: 325 TABLET, COATED ORAL at 09:45

## 2017-12-06 RX ADMIN — BUPROPION HYDROCHLORIDE 150 MG: 150 TABLET, EXTENDED RELEASE ORAL at 09:45

## 2017-12-06 RX ADMIN — INSULIN ASPART 2 UNITS: 100 INJECTION, SOLUTION INTRAVENOUS; SUBCUTANEOUS at 12:46

## 2017-12-06 RX ADMIN — HEPARIN SODIUM 5000 UNITS: 5000 INJECTION, SOLUTION INTRAVENOUS; SUBCUTANEOUS at 12:46

## 2017-12-06 RX ADMIN — INSULIN ASPART 7 UNITS: 100 INJECTION, SOLUTION INTRAVENOUS; SUBCUTANEOUS at 17:26

## 2017-12-06 RX ADMIN — A/SINGAPORE/GP1908/2015 IVR-180 (H1N1) (AN A/MICHIGAN/45/2015-LIKE VIRUS), A/SINGAPORE/GP2050/2015 (H3N2) (AN A/HONG KONG/4801/2014 - LIKE VIRUS), B/UTAH/9/2014 (A B/PHUKET/3073/2013-LIKE VIRUS), B/HONG KONG/259/2010 (A B/BRISBANE/60/08-LIKE VIRUS) 0.5 ML: 15; 15; 15; 15 INJECTION, SUSPENSION INTRAMUSCULAR at 18:02

## 2017-12-06 RX ADMIN — METOPROLOL TARTRATE 100 MG: 100 TABLET, FILM COATED ORAL at 09:46

## 2017-12-06 RX ADMIN — HEPARIN SODIUM 5000 UNITS: 5000 INJECTION, SOLUTION INTRAVENOUS; SUBCUTANEOUS at 04:31

## 2017-12-06 NOTE — PLAN OF CARE
Problem: Fall Risk (Adult)  Goal: Absence of Falls  Outcome: Ongoing (interventions implemented as appropriate)    Problem: Pressure Ulcer Risk (Alex Scale) (Adult,Obstetrics,Pediatric)  Goal: Skin Integrity  Outcome: Ongoing (interventions implemented as appropriate)    Problem: Confusion, Acute (Adult)  Goal: Identify Related Risk Factors and Signs and Symptoms  Outcome: Ongoing (interventions implemented as appropriate)    Problem: Urine Elimination, Impaired (Adult)  Goal: Identify Related Risk Factors and Signs and Symptoms  Outcome: Ongoing (interventions implemented as appropriate)    Problem: Anxiety (Adult)  Goal: Identify Related Risk Factors and Signs and Symptoms  Outcome: Ongoing (interventions implemented as appropriate)    Problem: Diabetes, Type 2 (Adult)  Goal: Signs and Symptoms of Listed Potential Problems Will be Absent or Manageable (Diabetes, Type 2)  Outcome: Ongoing (interventions implemented as appropriate)    Problem: Patient Care Overview (Adult)  Goal: Plan of Care Review  Outcome: Ongoing (interventions implemented as appropriate)    12/06/17 0327   Coping/Psychosocial Response Interventions   Plan Of Care Reviewed With patient   Patient Care Overview   Progress improving   Outcome Evaluation   Outcome Summary/Follow up Plan Patient states his blurred vision has improved. no complaints or concerns. patient sleep well throughout the night. will continue to monitor.

## 2017-12-06 NOTE — PLAN OF CARE
Problem: Inpatient SLP  Goal: Dysphagia- Patient will safely consume diet as per recommendation with no signs/symptoms of aspiration  Outcome: Outcome(s) achieved Date Met:  12/06/17 12/06/17 1558   Safely Consume Diet   Safely Consume Diet- SLP, Time to Achieve by discharge   Safely Consume Diet- SLP, Outcome goal met         Problem: Patient Care Overview (Adult)  Goal: Plan of Care Review  Outcome: Ongoing (interventions implemented as appropriate)    12/06/17 1558   Coping/Psychosocial Response Interventions   Plan Of Care Reviewed With patient;family   Patient Care Overview   Progress improving   Outcome Evaluation   Outcome Summary/Follow up Plan Pt seen for diet tolerance w/no issues and cog eval. Pt reports he is at baseline with cog at this time. No difficulty noted w.cog eval. ST to sign off. Please reconsult if neg changes occur.

## 2017-12-06 NOTE — PROGRESS NOTES
Continued Stay Note  [facility]     Patient Name: Carlito Mo  MRN: [id number]  Today's Date: 12/6/2017    Admit Date: 11/30/2017          Discharge Plan       12/06/17 1213    Case Management/Social Work Plan    Plan [facility] -  Acute ??    Patient/Family In Agreement With Plan yes    Additional Comments Lila/ Acute continues to follow for discharge. Still trying to determine appropriate level of care for discharge. CCP will continue to follow and assist as needed.               Discharge Codes     None        Expected Discharge Date and Time     Expected Discharge Date Expected Discharge Time    Dec 6, 2017             Zia Garrett RN

## 2017-12-06 NOTE — DISCHARGE INSTR - LAB
Call 's office in 10-14 days for results of blood work.  [phone]     Call [phone] to schedule visit with stroke clinic in 2 months.     [facility] will be in contact with 's office to get approval for PT OT home health, and will see patient within 48 hours.

## 2017-12-06 NOTE — THERAPY DISCHARGE NOTE
Acute Care - Speech Language Pathology Initial Eval/Discharge  [facility]     Patient Name: Carlito Mo  : 1955  MRN: [id number]  Today's Date: 2017               Admit Date: 2017     Speech-Language/Cognitive-Linguistic Evaluation  Subjective: The patient was seen on this date for a Cognitive-Linguistic Evaluation.  Patient was alert and cooperative.    The patient's history is significant for Encephalopathy, Hypertensive emergency, AMS, CVA.   Objective: The patient was assessed utilizing informal assessment comprising portions of the [place] Test for Differential Diagnosis of Aphasia, the [place] Naming Test, the Mini Inventory of Right Brain Function, and informal measures due to inability to fully participate in a standardized assessment Findings were as follows:  Assessment: The patient demonstrated functional speech-language skills and functional cognition. No deficits were noted with cognition and pt reports no residual memory issues. Pt w/continued visual difficulty, awaiting OT eval. Pt oriented x4, answered yes/no questions with 100% accuracy, demonstrated immediate and delayed recall 100%, story recall 90%, problem solving situations with 100% accuracy, category naming with 100% accuracy, deductive reasoning 100%, and sequencing tasks 100% accuracy.    Recommendations: ST to sign off. Please reconsult if negative changes occur.  Other Recommended Evaluations:     Speech Therapy is not recommended. Rationale pt at functional baseline level cognitively  It is anticipated patient will not need continued speech-language pathology services at the next level of care.       Visit Dx:    ICD-10-CM ICD-9-CM   1. Altered mental status, unspecified altered mental status- Acute encephalopathy R41.82 780.97   2. Lactic acid acidosis E87.2 276.2   3. Vomiting without nausea, intractability of vomiting not specified, unspecified vomiting type R11.11 787.03   4. Hyperglycemia R73.9 790.29   5.  Diarrhea, unspecified type R19.7 787.91     Patient Active Problem List   Diagnosis   • Depression   • Hyperlipidemia   • Chronic ischemic heart disease   • Vitamin D deficiency   • Erectile dysfunction   • Chronic fatigue   • Diabetes mellitus   • Skin lesion of chest wall   • Slow transit constipation   • Atherosclerosis of coronary artery   • Benign prostatic hyperplasia   • Chronic kidney disease   • History of basal cell carcinoma   • Essential hypertension     Past Medical History:   Diagnosis Date   • Acute sinusitis    • Anemia    • Arthritis    • Chronic ischemic heart disease    • Depression    • Diabetes mellitus type 2, uncontrolled, without complications    • Heart attack    • Hyperlipidemia    • Hypertension    • Screening for prostate cancer 2011   • Type 2 diabetes mellitus    • Vitamin D deficiency      Past Surgical History:   Procedure Laterality Date   • APPENDECTOMY N/A 02/14/2016    Dr. Alexey Dodson   • CORONARY ARTERY BYPASS GRAFT  11/2001          SLP EVALUATION (last 72 hours)      SLP Evaluation       12/06/17 1553                Rehab Evaluation    Document Type evaluation;discharge summary;therapy note (daily note)  -JT        Subjective Information no complaints;agree to therapy  -JT        Patient Effort, Rehab Treatment good  -JT        Symptoms Noted During/After Treatment none  -JT        General Information    Patient Profile Review yes  -JT        Subjective Patient Observations alert, up in bed  -JT        Pertinent History Of Current Problem AMS  -JT        Current Diet Limitations thin liquids;regular solid  -JT        Precautions/Limitations, Vision vision impairment, bilaterally  -JT        Precautions/Limitations, Hearing WNL  -JT        Prior Level of Function- Communication functional in all spheres  -JT        Prior Level of Function- Swallowing no diet consistency restrictions  -JT        Plans/Goals Discussed With patient and family;agreed upon  -JT        Barriers  to Rehab none identified  -JT        Clinical Impression    Functional Level At Time Of Evaluation WFL  -JT        Patient's Goals For Discharge patient did not state  -JT        Family Goals For Discharge family did not state  -JT        Criteria for Skilled Therapeutic Interventions Met no problems identified which require skilled intervention  -JT        Rehab Potential good, to achieve stated therapy goals  -JT        Therapy Frequency evaluation only  -JT        Predicted Duration of Therapy Intervention (days/wks) until discharge  -JT        Anticipated Discharge Disposition other (see comments)   unknown  -JT        Pain Assessment    Pain Assessment No/denies pain  -JT        Cognitive Assessment/Intervention    Current Cognitive/Communication Assessment functional  -JT        Orientation Status oriented x 4  -JT        Follows Commands/Answers Questions 100% of the time;able to follow single-step instructions  -JT          User Key  (r) = Recorded By, (t) = Taken By, (c) = Cosigned By    Initials Name Effective Dates    JT Parvin Lugo [place] 12/11/15 -            EDUCATION  The patient has been educated in the following areas:   Dysphagia (Swallowing Impairment).    SLP Recommendation and Plan        Rehab Potential: good, to achieve stated therapy goals  Criteria for Skilled Therapeutic Interventions Met: no problems identified which require skilled intervention  Anticipated Discharge Disposition: other (see comments) (awaiting placement)     Therapy Frequency: evaluation only  Predicted Duration of Therapy Intervention (days/wks): until discharge       Plan of Care Review  Plan Of Care Reviewed With: patient, family  Progress: improving  Outcome Summary/Follow up Plan: Pt seen for diet tolerance w/no issues and cog eval. Pt reports he is at baseline with cog at this time. No difficulty noted w.cog eval. ST to sign off. Please reconsult if neg changes occur.          IP SLP Goals       12/06/17 9952  12/02/17 1218       Safely Consume Diet    Safely Consume Diet- SLP, Date Established  12/02/17  -CP     Safely Consume Diet- SLP, Time to Achieve by discharge  -JT by discharge  -CP     Safely Consume Diet- SLP, Outcome goal met  -JT        User Key  (r) = Recorded By, (t) = Taken By, (c) = Cosigned By    Initials Name Provider Type    CP Lindsay Retana, MS CCC-SLP Speech and Language Pathologist    ASIF Stallworth Speech and Language Pathologist             SLP Outcome Measures (last 72 hours)      SLP Outcome Measures       12/06/17 1500          SLP Outcome Measures    Outcome Measure Used? Adult NOMS  -JT      FCM Scores    FCM Chosen Swallowing  -JT      Swallowing FCM Score 7  -JT        User Key  (r) = Recorded By, (t) = Taken By, (c) = Cosigned By    Initials Name Effective Dates    ASIF Stallworth 12/11/15 -           Time Calculation:         Time Calculation- SLP       12/06/17 1600          Time Calculation- SLP    SLP Start Time 1530  -JT      SLP Stop Time 1600  -JT      SLP Time Calculation (min) 30 min  -JT      SLP Received On 12/06/17  -JT        User Key  (r) = Recorded By, (t) = Taken By, (c) = Cosigned By    Initials Name Provider Type    ASIF Stallworth Speech and Language Pathologist          Therapy Charges for Today     Code Description Service Date Service Provider Modifiers Qty    [id number] [facility] EVAL SPEECH AND PROD W LANG  2 12/6/2017 Parvin Stallworth GN 1             ADULT NOMS (last 72 hours)      Adult NOMS       12/06/17 1500                FCM Scores    FCM Chosen Swallowing  -JT        Swallowing FCM Score 7  -JT          User Key  (r) = Recorded By, (t) = Taken By, (c) = Cosigned By    Initials Name Effective Dates    ASIF Stallworth 12/11/15 -              SLP Discharge Summary  Anticipated Discharge Disposition: other (see comments) (awaiting placement)  Reason for Discharge: At baseline function  Outcomes Achieved: Able to achieve  all goals within established timeline  Discharge Destination: other (comment) (awaiting DC placement)    Parvin Stallworth  12/6/2017

## 2017-12-06 NOTE — DISCHARGE PLACEMENT REQUEST
"Carlito Mo (62 y.o. Male)     Date of Birth Social Security Number Address Home Phone MRN    1955  [address] [phone] [id number]    Scientologist Marital Status          Religion        Admission Date Admission Type Admitting Provider Attending Provider Department, Room/Bed    11/30/17 Emergency Brice Reza MD Jambeih, Rami, MD [address], 653/1    Discharge Date Discharge Disposition Discharge Destination         Home or Self Care             Attending Provider: Brice Reza MD     Allergies:  Prozac [Fluoxetine Hcl]    Isolation:  None   Infection:  None   Code Status:  FULL    Ht:  185.4 cm (72.99\")   Wt:  90 kg (198 lb 6.4 oz)    Admission Cmt:  None   Principal Problem:  None                Active Insurance as of 11/30/2017     Primary Coverage     Payor Plan Insurance Group Employer/Plan Group    ANTHEM MEDICAID ANTHEM MEDICAID [id number]     Payor Plan Address Payor Plan Phone Number Effective From Effective To    [address] [address] [phone] 12/1/2015     [place] [address]       Subscriber Name Subscriber Birth Date Member ID       CARLITO MO 1955 [id number]                 Emergency Contacts      (Rel.) Home Phone Work Phone Mobile Phone    Lissette Mo (Spouse) [phone] -- --              "

## 2017-12-06 NOTE — SIGNIFICANT NOTE
12/06/17 0902   Rehab Treatment   Discipline occupational therapist   Rehab Evaluation   Evaluation Not Performed other (see comments)  (Discussed with pt and nurse. Pt states continues with visual changes. Nsg states will call MD and obtain activity . Nsg and ccp aware continue to hold OT at this time)

## 2017-12-06 NOTE — NURSING NOTE
Pt is currently ambulating 150 ft with CG asst. ST eval and signed off. DIscussed safety with wife who is able to stay and care for pt with 24/7 asst. Plans return home with home health.   No need for acute rehab.     Lila Bentley RN  Acute Rehab Admission Nurse

## 2017-12-06 NOTE — SIGNIFICANT NOTE
12/06/17 1449   Rehab Treatment   Discipline occupational therapist   Rehab Evaluation   Evaluation Not Performed other (see comments)  (still awaiting MD to see regarding pts activity level due to vision changes yesterday post NEISHA. check back tomorrow. 1458)   Recommendation   OT - Next Appointment 12/07/17

## 2017-12-06 NOTE — PROGRESS NOTES
Continued Stay Note  [facility]     Patient Name: Carlito Mo  MRN: [id number]  Today's Date: 12/6/2017    Admit Date: 11/30/2017          Discharge Plan       12/06/17 1602    Case Management/Social Work Plan    Plan [facility]    Patient/Family In Agreement With Plan yes    Additional Comments Noted plans for discharge. Lila/BRENDAN Acute here and patient not needing acute rehab. Spoke with patient and wife and they would like a referral to [facility] as they have used them for their son. Called referral to Nancy/Christy .               Discharge Codes     None        Expected Discharge Date and Time     Expected Discharge Date Expected Discharge Time    Dec 6, 2017             Zia Garrett RN

## 2017-12-06 NOTE — THERAPY TREATMENT NOTE
Acute Care - Physical Therapy Treatment Note  [facility]     Patient Name: Carlito Mo  : 1955  MRN: [id number]  Today's Date: 2017             Admit Date: 2017    Visit Dx:    ICD-10-CM ICD-9-CM   1. Altered mental status, unspecified altered mental status- Acute encephalopathy R41.82 780.97   2. Lactic acid acidosis E87.2 276.2   3. Vomiting without nausea, intractability of vomiting not specified, unspecified vomiting type R11.11 787.03   4. Hyperglycemia R73.9 790.29   5. Diarrhea, unspecified type R19.7 787.91     Patient Active Problem List   Diagnosis   • Depression   • Hyperlipidemia   • Chronic ischemic heart disease   • Vitamin D deficiency   • Erectile dysfunction   • Chronic fatigue   • Diabetes mellitus   • Skin lesion of chest wall   • Slow transit constipation   • Atherosclerosis of coronary artery   • Benign prostatic hyperplasia   • Chronic kidney disease   • History of basal cell carcinoma   • Essential hypertension   • Altered mental status               Adult Rehabilitation Note       17 1459 17 1511       Rehab Assessment/Intervention    Discipline physical therapist  -MG physical therapist  -MA     Document Type therapy note (daily note)  -MG therapy note (daily note)  -MA     Subjective Information agree to therapy   blurred vision, RN aware  -MG agree to therapy   blurred vision today, nsg aware  -MA     Patient Effort, Rehab Treatment good  -MG good  -MA     Precautions/Limitations fall precautions  -MG fall precautions  -MA     Recorded by [MG] Megan Gosselin, PT [MA] Reina Robles, PT     Pain Assessment    Pain Assessment No/denies pain  -MG No/denies pain  -MA     Recorded by [MG] Megan Gosselin, PT [MA] Reina Robles, PT     Vision Assessment/Intervention    Visual Impairment  blurred  -MA     Recorded by  [MA] Reina Robles, PT     Cognitive Assessment/Intervention    Current Cognitive/Communication Assessment impaired  -MG impaired  -MA      Orientation Status oriented to;person;place;time  -MG oriented to;person;place;time  -MA     Follows Commands/Answers Questions 100% of the time;able to follow single-step instructions;needs increased time  -% of the time;able to follow single-step instructions;needs cueing;needs increased time  -MA     Personal Safety mild impairment  -MG mild impairment  -MA     Personal Safety Interventions fall prevention program maintained;gait belt  -MG fall prevention program maintained;gait belt;nonskid shoes/slippers when out of bed  -MA     Recorded by [MG] Megan Gosselin, PT [MA] Reina Robles, PT     Bed Mobility, Assessment/Treatment    Bed Mob, Supine to Sit, [place] independent  -MG independent  -MA     Bed Mob, Sit to Supine, [place] independent  -MG independent  -MA     Recorded by [MG] Megan Gosselin, PT [MA] Reina Robles, PT     Transfer Assessment/Treatment    Transfers, Sit-Stand [place] supervision required  -MG supervision required  -MA     Transfers, Stand-Sit [place] supervision required  -MG supervision required  -MA     Transfers, Sit-Stand-Sit, Assist Device --   no AD  -MG --   no AD  -MA     Recorded by [MG] Megan Gosselin, PT [MA] Reina Robles, PT     Gait Assessment/Treatment    Gait, [place] Level contact guard assist  -MG contact guard assist;minimum assist (75% patient effort)  -MA     Gait, Assistive Device --   no AD  -MG --   no AD  -MA     Gait, Distance (Feet) 150  -  -MA     Gait, Gait Pattern Analysis swing-through gait  -MG swing-through gait  -MA     Gait, Gait Deviations  eli decreased;stride length decreased;step length decreased  -MA     Gait, Safety Issues  balance decreased during turns  -MA     Gait, Impairments  strength decreased;impaired balance  -MA     Gait, Comment decreased balance around obstacles and in busy environments  -MG slightly unsteady, occasionally required Shakeel to avoid obstacles in path. Blurred vision  limiting further ambulation.  -MA     Recorded by [MG] Megan Gosselin, PT [MA] Reina Robles, PT     Stairs Assessment/Treatment    Number of Stairs 10  -MG      Stairs, Handrail Location both sides  -MG      Stairs, [place] Level contact guard assist  -MG      Stairs, Technique Used step to step (ascending);step to step (descending)  -MG      Stairs, Safety Issues sequencing ability decreased;balance decreased during turns  -MG      Stairs, Impairments strength decreased;coordination impaired;impaired balance  -MG      Recorded by [MG] Megan Gosselin, PT      Therapy Exercises    Bilateral Lower Extremities  AROM:;10 reps;ankle pumps/circles;LAQ;hip flexion  -MA     Recorded by  [MA] Reina Robles PT     Positioning and Restraints    Pre-Treatment Position in bed  -MG in bed  -MA     Post Treatment Position bed  -MG bed  -MA     In Bed sitting;encouraged to call for assist;call light within reach;with family/caregiver  -MG notified nsg;supine;call light within reach;encouraged to call for assist  -MA     Recorded by [MG] Megan Gosselin, PT [MA] Reina Robles, PT       User Key  (r) = Recorded By, (t) = Taken By, (c) = Cosigned By    Initials Name Effective Dates    THOMAS Robles, PT 12/13/16 -     MG Megan Gosselin, PT 09/13/17 -                 IP PT Goals       12/04/17 1330          Bed Mobility PT LTG    Bed Mobility PT LTG, Date Established 12/04/17  -KH      Bed Mobility PT LTG, Time to Achieve 1 wk  -KH      Bed Mobility PT LTG, Activity Type all bed mobility  -KH      Bed Mobility PT LTG, [place] Level independent  -KH      Transfer Training PT LTG    Transfer Training PT LTG, Date Established 12/04/17  -KH      Transfer Training PT LTG, Time to Achieve 1 wk  -KH      Transfer Training PT LTG, Activity Type all transfers  -KH      Transfer Training PT LTG, [place] Level supervision required  -KH      Gait Training PT LTG    Gait Training Goal PT LTG, Date Established  12/04/17  -KH      Gait Training Goal PT LTG, Time to Achieve 1 wk  -KH      Gait Training Goal PT LTG, [place] Level supervision required  -KH      Gait Training Goal PT LTG, Distance to Achieve 300 ft  -KH      Stair Training PT LTG    Stair Training Goal PT LTG, Date Established 12/04/17  -KH      Stair Training Goal PT LTG, Time to Achieve 1 wk  -KH      Stair Training Goal PT LTG, Number of Steps 6  -KH      Stair Training Goal PT LTG, [place] Level contact guard assist  -KH      Stair Training Goal PT LTG, Assist Device 1 handrail  -KH      Patient Education PT LTG    Patient Education PT LTG, Date Established 12/04/17  -KH      Patient Education PT LTG, Time to Achieve 1 wk  -KH      Patient Education PT LTG, Education Type HEP  -KH      Patient Education PT LTG, Education Understanding demonstrate adequately  -KH        User Key  (r) = Recorded By, (t) = Taken By, (c) = Cosigned By    Initials Name Provider Type    TONY Burns, PT Physical Therapist          Physical Therapy Education     Title: PT OT SLP Therapies (Done)     Topic: Physical Therapy (Done)     Point: Mobility training (Done)    Learning Progress Summary    Learner Readiness Method Response Comment Documented by Status   Patient Acceptance E VU,NR  MG 12/06/17 1501 Done    Acceptance E VU  CS 12/05/17 1709 Done    Acceptance E VU  MA 12/05/17 1529 Done    Acceptance E VU,NR   12/04/17 1330 Done               Point: Home exercise program (Done)    Learning Progress Summary    Learner Readiness Method Response Comment Documented by Status   Patient Acceptance E VU,NR  MG 12/06/17 1501 Done    Acceptance E VU  MA 12/05/17 1529 Done    Acceptance E VU,NR   12/04/17 1330 Done               Point: Body mechanics (Done)    Learning Progress Summary    Learner Readiness Method Response Comment Documented by Status   Patient Acceptance E VU,NR  MG 12/06/17 1501 Done               Point: Precautions (Done)    Learning  Progress Summary    Learner Readiness Method Response Comment Documented by Status   Patient Acceptance E VU,NR  MG 12/06/17 1501 Done    Acceptance E VU  MA 12/05/17 1529 Done    Acceptance E VU,NR   12/04/17 1330 Done                      User Key     Initials Effective Dates Name Provider Type Discipline     05/18/15 -  Jody Burns, PT Physical Therapist PT     06/16/16 -  Ivana Yeager, RN Registered Nurse Nurse    MA 12/13/16 -  Reina Robles, PT Physical Therapist PT     09/13/17 -  Megan Gosselin, PT Physical Therapist PT                    PT Recommendation and Plan  Anticipated Discharge Disposition: home with home health  Planned Therapy Interventions: balance training, bed mobility training, gait training, home exercise program, patient/family education, strengthening, transfer training  PT Frequency: daily  Plan of Care Review  Outcome Summary/Follow up Plan: Continues to have blurred vision, however functional. Progressed pt to stairs. Recommend assist initially on stairs at home. Recommend OPPT for pt upon d/c to improve LE strength and balance. Family in agreement. Notified discharge planner. Will continue to progress high level balance activities to decreased fall risk.            Outcome Measures       12/06/17 1500 12/05/17 1500 12/04/17 1336    How much help from another person do you currently need...    Turning from your back to your side while in flat bed without using bedrails? 4  -MG 4  -MA 4  -KH    Moving from lying on back to sitting on the side of a flat bed without bedrails? 4  -MG 4  -MA 4  -KH    Moving to and from a bed to a chair (including a wheelchair)? 4  -MG 3  -MA 3  -KH    Standing up from a chair using your arms (e.g., wheelchair, bedside chair)? 3  -MG 3  -MA 3  -KH    Climbing 3-5 steps with a railing? 3  -MG 2  -MA 2  -KH    To walk in hospital room? 3  -MG 3  -MA 3  -KH    AM-PAC 6 Clicks Score 21  -MG 19  -MA 19  -KH    Functional Assessment     Outcome Measure Options AM-PAC 6 Clicks Basic Mobility (PT)  -MG AM-PAC 6 Clicks Basic Mobility (PT)  -MA AM-PAC 6 Clicks Basic Mobility (PT)  -KH      User Key  (r) = Recorded By, (t) = Taken By, (c) = Cosigned By    Initials Name Provider Type    TONY Burns, PT Physical Therapist    MA Reina Robles, PT Physical Therapist    MG Megan Gosselin, PT Physical Therapist           Time Calculation:         PT Charges       12/06/17 1503          Time Calculation    Start Time 1444  -MG      Stop Time 1456  -MG      Time Calculation (min) 12 min  -MG      PT Received On 12/06/17  -MG      PT - Next Appointment 12/07/17  -MG        User Key  (r) = Recorded By, (t) = Taken By, (c) = Cosigned By    Initials Name Provider Type    MG Megan Gosselin, PT Physical Therapist          Therapy Charges for Today     Code Description Service Date Service Provider Modifiers Qty    [id number] HC PT THER PROC EA 15 MIN 12/6/2017 Megan Gosselin, PT GP 1          PT G-Codes  Outcome Measure Options: AM-PAC 6 Clicks Basic Mobility (PT)    Megan Gosselin, PT  12/6/2017

## 2017-12-06 NOTE — PLAN OF CARE
Problem: Patient Care Overview (Adult)  Goal: Plan of Care Review    12/06/17 1502   Outcome Evaluation   Outcome Summary/Follow up Plan Continues to have blurred vision, however functional. Progressed pt to stairs. Recommend assist initially on stairs at home. Recommend OPPT for pt upon d/c to improve LE strength and balance. Family in agreement. Notified discharge planner. Will continue to progress high level balance activities to decreased fall risk.
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
## 2017-12-07 ENCOUNTER — TELEPHONE (OUTPATIENT)
Dept: FAMILY MEDICINE CLINIC | Facility: CLINIC | Age: 62
End: 2017-12-07

## 2017-12-07 ENCOUNTER — TRANSCRIBE ORDERS (OUTPATIENT)
Dept: ADMINISTRATIVE | Facility: HOSPITAL | Age: 62
End: 2017-12-07

## 2017-12-07 DIAGNOSIS — I63.9 ACUTE CVA (CEREBROVASCULAR ACCIDENT) (HCC): Primary | ICD-10-CM

## 2017-12-07 LAB
AT III PPP CHRO-ACNC: 103 % (ref 90–134)
LA NT DPL PPP: 36.2 SEC (ref 0–55)
LA NT DPL/LA NT HPL PPP-RTO: 0.97 RATIO (ref 0–1.4)
LA NT PLATELET PPP: 31.5 SEC (ref 0–51.9)
LUPUS ANTICOAGULANT REFLEX: NORMAL
PROT C PPP-ACNC: 167 % (ref 86–163)
SCREEN DRVVT: 30.7 SEC (ref 0–47)
THROMBIN TIME: 17.7 SEC (ref 0–23)
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
## 2017-12-07 NOTE — PROGRESS NOTES
Case Management Discharge Note    Final Note: Plan home with [facility] JAYLENE Taylor, DEMAR    Discharge Placement     Facility/Agency Request Status Selected? Address Phone Number Fax Number    [facility] Accepted    Yes [address], [facility] [address] [phone]    [facility] Accepted     [address], [address], [facility] [address] [phone]        Zia Garrett RN 12/6/2017 16:02    Called to Gavi               [facility] Pending - No Request Sent     [address] [facility] [address] [phone]         Zia Garrett RN 12/4/2017 10:27    Called to Lila                        Discharge Codes: 06  Discharged/transferred to home under care of organized home health service organization in anticipation of skilled care

## 2017-12-08 LAB
BACTERIA SPEC AEROBE CULT: NORMAL
BACTERIA SPEC AEROBE CULT: NORMAL
HCYS SERPL-SCNC: 19.6 UMOL/L (ref 0–15)

## 2017-12-10 LAB
PS IGG SER-ACNC: 2 GPS IGG (ref 0–11)
PS IGM SER-ACNC: 4 MPS IGM (ref 0–25)

## 2017-12-11 LAB
F2 GENE MUT ANL BLD/T: NORMAL
F5 GENE MUT ANL BLD/T: NORMAL
FACTOR V COMMENT: NORMAL
Lab: NORMAL

## 2017-12-12 ENCOUNTER — OFFICE VISIT (OUTPATIENT)
Dept: FAMILY MEDICINE CLINIC | Facility: CLINIC | Age: 62
End: 2017-12-12

## 2017-12-12 VITALS
HEIGHT: 73 IN | RESPIRATION RATE: 18 BRPM | WEIGHT: 191 LBS | SYSTOLIC BLOOD PRESSURE: 150 MMHG | OXYGEN SATURATION: 95 % | DIASTOLIC BLOOD PRESSURE: 80 MMHG | TEMPERATURE: 98.1 F | HEART RATE: 81 BPM | BODY MASS INDEX: 25.31 KG/M2

## 2017-12-12 DIAGNOSIS — I10 ESSENTIAL HYPERTENSION: ICD-10-CM

## 2017-12-12 DIAGNOSIS — N52.1 ERECTILE DYSFUNCTION DUE TO DISEASES CLASSIFIED ELSEWHERE: ICD-10-CM

## 2017-12-12 DIAGNOSIS — E78.5 HYPERLIPIDEMIA, UNSPECIFIED HYPERLIPIDEMIA TYPE: ICD-10-CM

## 2017-12-12 DIAGNOSIS — R53.82 CHRONIC FATIGUE: ICD-10-CM

## 2017-12-12 DIAGNOSIS — D64.9 ANEMIA, UNSPECIFIED TYPE: Primary | ICD-10-CM

## 2017-12-12 DIAGNOSIS — E55.9 VITAMIN D DEFICIENCY: ICD-10-CM

## 2017-12-12 DIAGNOSIS — IMO0001 UNCONTROLLED DIABETES MELLITUS TYPE 2 WITHOUT COMPLICATIONS, UNSPECIFIED LONG TERM INSULIN USE STATUS: ICD-10-CM

## 2017-12-12 PROCEDURE — 99214 OFFICE O/P EST MOD 30 MIN: CPT | Performed by: NURSE PRACTITIONER

## 2017-12-12 RX ORDER — INSULIN GLULISINE 100 [IU]/ML
INJECTION, SOLUTION SUBCUTANEOUS
Qty: 15 ML | Refills: 5 | Status: SHIPPED | OUTPATIENT
Start: 2017-12-12 | End: 2017-12-18

## 2017-12-12 RX ORDER — INSULIN GLARGINE 300 U/ML
40 INJECTION, SOLUTION SUBCUTANEOUS DAILY
Qty: 3 PEN | Refills: 5 | Status: SHIPPED | OUTPATIENT
Start: 2017-12-12 | End: 2018-01-18 | Stop reason: CLARIF
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
## 2017-12-13 DIAGNOSIS — I67.9 CEREBROVASCULAR DISEASE: Primary | ICD-10-CM

## 2017-12-13 LAB
ALBUMIN SERPL-MCNC: 3.8 G/DL (ref 3.5–5.2)
ALBUMIN/GLOB SERPL: 1.3 G/DL
ALP SERPL-CCNC: 82 U/L (ref 39–117)
ALT SERPL-CCNC: 17 U/L (ref 1–41)
ANTI-PHOSPHATIDIC ACID: NORMAL
ANTI-PHOSPHATIDIC,IGA: 3.9 U/ML
ANTI-PHOSPHATIDIC,IGG: 3.4 U/ML
ANTI-PHOSPHATIDIC,IGM: 2.3 U/ML
ANTI-PHOSPHATIDYL GLYCEROL, IGA: 5.3 U/ML
ANTI-PHOSPHATIDYL GLYCEROL, IGG: 4.3 U/ML
ANTI-PHOSPHATIDYL GLYCEROL, IGM: 3.9 U/ML
ANTI-PHOSPHATIDYL GLYCEROL: NORMAL
ANTI-PHOSPHATIDYL INOSITOL: NORMAL
ANTI-PHOSPHATIDYL,IGA: 7.5 U/ML
ANTI-PHOSPHATIDYL,IGG: 3.2 U/ML
ANTI-PHOSPHATIDYL,IGM: 3.1 U/ML
ANTI-PHOSPHATIDYLETHANOLAMINE, IGA: 1.3 U/ML
ANTI-PHOSPHATIDYLETHANOLAMINE, IGG: 1.2 U/ML
ANTI-PHOSPHATIDYLETHANOLAMINE, IGM: 0.9 U/ML
ANTI-PHOSPHATIDYLETHANOLAMINE: NORMAL
AST SERPL-CCNC: 20 U/L (ref 1–40)
BASOPHILS # BLD AUTO: 0 10*3/MM3 (ref 0–0.2)
BASOPHILS NFR BLD AUTO: 0 % (ref 0–1.5)
BILIRUB SERPL-MCNC: 0.4 MG/DL (ref 0.1–1.2)
BUN SERPL-MCNC: 11 MG/DL (ref 8–23)
BUN/CREAT SERPL: 9.2 (ref 7–25)
CALCIUM SERPL-MCNC: 9.4 MG/DL (ref 8.6–10.5)
CHLORIDE SERPL-SCNC: 99 MMOL/L (ref 98–107)
CO2 SERPL-SCNC: 28.4 MMOL/L (ref 22–29)
CREAT SERPL-MCNC: 1.2 MG/DL (ref 0.76–1.27)
EOSINOPHIL # BLD AUTO: 0.17 10*3/MM3 (ref 0–0.7)
EOSINOPHIL NFR BLD AUTO: 2.3 % (ref 0.3–6.2)
ERYTHROCYTE [DISTWIDTH] IN BLOOD BY AUTOMATED COUNT: 13.4 % (ref 11.5–14.5)
GFR SERPLBLD CREATININE-BSD FMLA CKD-EPI: 61 ML/MIN/1.73
GFR SERPLBLD CREATININE-BSD FMLA CKD-EPI: 74 ML/MIN/1.73
GLOBULIN SER CALC-MCNC: 2.9 GM/DL
GLUCOSE SERPL-MCNC: 120 MG/DL (ref 65–99)
HCT VFR BLD AUTO: 39.5 % (ref 40.4–52.2)
HGB BLD-MCNC: 12.1 G/DL (ref 13.7–17.6)
IMM GRANULOCYTES # BLD: 0.02 10*3/MM3 (ref 0–0.03)
IMM GRANULOCYTES NFR BLD: 0.3 % (ref 0–0.5)
LYMPHOCYTES # BLD AUTO: 1.56 10*3/MM3 (ref 0.9–4.8)
LYMPHOCYTES NFR BLD AUTO: 21.2 % (ref 19.6–45.3)
MCH RBC QN AUTO: 26.1 PG (ref 27–32.7)
MCHC RBC AUTO-ENTMCNC: 30.6 G/DL (ref 32.6–36.4)
MCV RBC AUTO: 85.3 FL (ref 79.8–96.2)
MONOCYTES # BLD AUTO: 0.6 10*3/MM3 (ref 0.2–1.2)
MONOCYTES NFR BLD AUTO: 8.1 % (ref 5–12)
NEUTROPHILS # BLD AUTO: 5.02 10*3/MM3 (ref 1.9–8.1)
NEUTROPHILS NFR BLD AUTO: 68.1 % (ref 42.7–76)
PLATELET # BLD AUTO: 402 10*3/MM3 (ref 140–500)
POTASSIUM SERPL-SCNC: 3.9 MMOL/L (ref 3.5–5.2)
PROT SERPL-MCNC: 6.7 G/DL (ref 6–8.5)
RBC # BLD AUTO: 4.63 10*6/MM3 (ref 4.6–6)
SODIUM SERPL-SCNC: 142 MMOL/L (ref 136–145)
WBC # BLD AUTO: 7.37 10*3/MM3 (ref 4.5–10.7)

## 2017-12-15 ENCOUNTER — APPOINTMENT (OUTPATIENT)
Dept: CARDIOLOGY | Facility: HOSPITAL | Age: 62
End: 2017-12-15

## 2017-12-15 ENCOUNTER — HOSPITAL ENCOUNTER (OUTPATIENT)
Dept: CARDIOLOGY | Facility: HOSPITAL | Age: 62
Discharge: HOME | End: 2017-12-15
Admitting: NURSE PRACTITIONER

## 2017-12-15 DIAGNOSIS — I67.9 CEREBROVASCULAR DISEASE: ICD-10-CM

## 2017-12-15 PROCEDURE — 93225 XTRNL ECG REC<48 HRS REC: CPT

## 2017-12-15 PROCEDURE — 93226 XTRNL ECG REC<48 HR SCAN A/R: CPT

## 2017-12-18 ENCOUNTER — OFFICE VISIT (OUTPATIENT)
Dept: ENDOCRINOLOGY | Age: 62
End: 2017-12-18

## 2017-12-18 VITALS
DIASTOLIC BLOOD PRESSURE: 82 MMHG | HEIGHT: 73 IN | WEIGHT: 191.5 LBS | SYSTOLIC BLOOD PRESSURE: 130 MMHG | BODY MASS INDEX: 25.38 KG/M2

## 2017-12-18 DIAGNOSIS — Z79.4 TYPE 2 DIABETES MELLITUS WITH MODERATE NONPROLIFERATIVE RETINOPATHY WITHOUT MACULAR EDEMA, WITH LONG-TERM CURRENT USE OF INSULIN, UNSPECIFIED LATERALITY (HCC): Primary | ICD-10-CM

## 2017-12-18 DIAGNOSIS — R53.82 CHRONIC FATIGUE: ICD-10-CM

## 2017-12-18 DIAGNOSIS — N52.1 ERECTILE DYSFUNCTION DUE TO DISEASES CLASSIFIED ELSEWHERE: ICD-10-CM

## 2017-12-18 DIAGNOSIS — I10 ESSENTIAL HYPERTENSION: ICD-10-CM

## 2017-12-18 DIAGNOSIS — E78.5 HYPERLIPIDEMIA, UNSPECIFIED HYPERLIPIDEMIA TYPE: ICD-10-CM

## 2017-12-18 DIAGNOSIS — E11.8 CONTROLLED DIABETES MELLITUS TYPE 2 WITH COMPLICATIONS, UNSPECIFIED LONG TERM INSULIN USE STATUS: ICD-10-CM

## 2017-12-18 DIAGNOSIS — E11.3399 TYPE 2 DIABETES MELLITUS WITH MODERATE NONPROLIFERATIVE RETINOPATHY WITHOUT MACULAR EDEMA, WITH LONG-TERM CURRENT USE OF INSULIN, UNSPECIFIED LATERALITY (HCC): Primary | ICD-10-CM

## 2017-12-18 PROCEDURE — 93227 XTRNL ECG REC<48 HR R&I: CPT | Performed by: INTERNAL MEDICINE

## 2017-12-18 PROCEDURE — 99214 OFFICE O/P EST MOD 30 MIN: CPT | Performed by: NURSE PRACTITIONER

## 2017-12-18 RX ORDER — ERGOCALCIFEROL 1.25 MG/1
CAPSULE ORAL
Qty: 24 CAPSULE | Refills: 1 | Status: SHIPPED | OUTPATIENT
Start: 2017-12-18 | End: 2017-12-20 | Stop reason: SDUPTHER

## 2017-12-18 RX ORDER — ERGOCALCIFEROL 1.25 MG/1
CAPSULE ORAL
Qty: 24 CAPSULE | Refills: 1 | Status: SHIPPED | OUTPATIENT
Start: 2017-12-18 | End: 2017-12-18 | Stop reason: SDUPTHER

## 2017-12-18 NOTE — PATIENT INSTRUCTIONS
Stop novolog and continue apidra 15 units prior to each meal. Skip dose if you skip meal  Continue toujeo  Labs pending

## 2017-12-18 NOTE — PROGRESS NOTES
"Subjective   Carlito Mo is a 62 y.o. male is here today for follow-up.  Chief Complaint   Patient presents with   • Diabetes     recent labs, pt test BG 1-2x daily, pt did not bring meter   • Hyperlipidemia     patient has not ravi doing insulin due to being out of pen needles x3-4 weeks   • Hypertension   • Vitamin D Deficiency   • Erectile Dysfunction   • Chronic Kidney Disease     /82  Ht 185.4 cm (72.99\")  Wt 86.9 kg (191 lb 8 oz)  BMI 25.27 kg/m2  Current Outpatient Prescriptions on File Prior to Visit   Medication Sig   • aspirin  MG EC tablet Take 1 tablet by mouth daily.   • atorvastatin (LIPITOR) 80 MG tablet Take 1 tablet by mouth Daily.   • buPROPion XL (WELLBUTRIN XL) 150 MG 24 hr tablet Take 1 tablet by mouth Daily for 180 days.   • docusate sodium (COLACE) 100 MG capsule Take 1 capsule by mouth 2 (two) times a day. (Patient taking differently: Take 100 mg by mouth Daily.)   • metoprolol tartrate (LOPRESSOR) 100 MG tablet Take 1 tablet by mouth Every 12 (Twelve) Hours.   • RELION GLUCOSE TEST STRIPS test strip 1 each by Other route 2 (two) times a day. Use as instructed   • SAXagliptin-MetFORMIN ER 2.5-1000 MG tablet sustained-release 24 hour Take 2 tablets by mouth Daily. Pt taking 50/100 - 2 tablets with evening meal   • valsartan (DIOVAN) 320 MG tablet Take 1 tablet by mouth Daily for 180 days.   • [DISCONTINUED] vitamin D (ERGOCALCIFEROL) 79741 UNITS capsule capsule Take 1 cap twice weekly   • APIDRA SOLOSTAR 100 UNIT/ML solution pen-injector 15 units ac meals tid   • insulin aspart (novoLOG FLEXPEN) 100 UNIT/ML solution pen-injector sc pen Inject  under the skin 3 (Three) Times a Day With Meals. Pen found in pt belongs - unsure dose/frequency   • TOUJEO SOLOSTAR 300 UNIT/ML solution pen-injector Inject 40 Units under the skin Daily.     No current facility-administered medications on file prior to visit.      Family History   Problem Relation Age of Onset   • Diabetes Mother    • " Hypertension Mother    • Macular degeneration Mother    • Alcohol abuse Father    • Cancer Father      lung   • Alcohol abuse Brother      Social History   Substance Use Topics   • Smoking status: Never Smoker   • Smokeless tobacco: Never Used   • Alcohol use No     Allergies   Allergen Reactions   • Prozac [Fluoxetine Hcl] Other (See Comments)     shaking         History of Present Illness   Encounter Diagnoses   Name Primary?   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  He states he was recently in the hospital for a stroke on November 30.  He was admitted for 6 days.  He states since being home his blood sugars have been in the 150-160 range.  He did have a low blood sugar reading of 55 in the hospital.  He recently had a Holter monitor done and will follow-up with a cardiologist.  According to his records he does have mitral valve regurgitation.  He states he has not been taking his insulin because he's been out of pen needles for the past several weeks.  His last hemoglobin A1c reflects his diabetes is poorly controlled.  Patient has no showed for his last several appointments.  He states he has been taking Apidra as well as NovoLog.  Patient was educated today that both of these insulins have the same peak onset and duration and he should only be taking one of them and not both.    The following portions of the patient's history were reviewed and updated as appropriate: allergies, current medications, past family history, past medical history, past social history, past surgical history and problem list.    Review of Systems   Constitutional: Negative for fatigue.   HENT: Negative for trouble swallowing.    Eyes: Negative for visual disturbance.    Respiratory: Negative for shortness of breath.    Cardiovascular: Negative for leg swelling.   Endocrine: Negative for polyuria.   Skin: Negative for wound.   Neurological: Negative for numbness.       Objective   Physical Exam   Constitutional: He is oriented to person, place, and time. He appears well-developed and well-nourished. No distress.   HENT:   Head: Normocephalic and atraumatic.   Right Ear: External ear normal.   Left Ear: External ear normal.   Nose: Nose normal.   Eyes: Pupils are equal, round, and reactive to light. Right eye exhibits no discharge. Left eye exhibits no discharge.   Neck: Normal range of motion. Neck supple. Carotid bruit is not present. No tracheal deviation, no edema and no erythema present. No thyromegaly present.   Cardiovascular: Normal rate, regular rhythm, normal heart sounds and intact distal pulses.  Exam reveals no gallop and no friction rub.    No murmur heard.  Pulmonary/Chest: Effort normal and breath sounds normal. No respiratory distress. He has no wheezes. He has no rales.   Abdominal: Soft. Bowel sounds are normal. He exhibits no distension. There is no tenderness.   Musculoskeletal: Normal range of motion. He exhibits no edema or deformity.   Lymphadenopathy:     He has no cervical adenopathy.   Neurological: He is alert and oriented to person, place, and time. Coordination normal.   Skin: Skin is warm and dry. No rash noted. He is not diaphoretic. No erythema. No pallor.   Has bedbug bites on both arms   Psychiatric: He has a normal mood and affect. His behavior is normal. Judgment and thought content normal.   Nursing note and vitals reviewed.    Lab Results   Component Value Date    HGBA1C 12.79 (H) 03/23/2017         Assessment/Plan   Problems Addressed this Visit        Cardiovascular and Mediastinum    Hyperlipidemia    Essential hypertension       Endocrine    Diabetes mellitus - Primary          In summary, patient was seen and examined.  He had recent labs  and his primary care provider's office which were reviewed.  He has not had any recent labs to evaluate his cholesterol or his diabetes.  He will have additional labs done at today's visit will be notified of the results with any further recommendations.  His insulin medications have been refilled per his request.  He was educated today on his mealtime insulin.  He was given samples of Novolog and toujeo at today's visit.  New prescription for pen needles were sent to his pharmacy.  He will follow-up in 4 months with labs.

## 2017-12-19 LAB
25(OH)D3+25(OH)D2 SERPL-MCNC: 18.4 NG/ML (ref 30–100)
C PEPTIDE SERPL-MCNC: 2.1 NG/ML (ref 1.1–4.4)
CHOLEST SERPL-MCNC: 159 MG/DL (ref 0–200)
FT4I SERPL CALC-MCNC: 2 (ref 1.2–4.9)
HBA1C MFR BLD: 12.11 % (ref 4.8–5.6)
HDLC SERPL-MCNC: 49 MG/DL (ref 40–60)
INTERPRETATION: NORMAL
LDLC SERPL CALC-MCNC: 89 MG/DL (ref 0–100)
Lab: NORMAL
MICROALBUMIN UR-MCNC: 3610.9 UG/ML
T3FREE SERPL-MCNC: 3.2 PG/ML (ref 2–4.4)
T3RU NFR SERPL: 28 % (ref 24–39)
T4 FREE SERPL-MCNC: 1.38 NG/DL (ref 0.93–1.7)
T4 SERPL-MCNC: 7.2 UG/DL (ref 4.5–12)
TRIGL SERPL-MCNC: 105 MG/DL (ref 0–150)
TSH SERPL DL<=0.005 MIU/L-ACNC: 5.49 UIU/ML (ref 0.45–4.5)
VLDLC SERPL CALC-MCNC: 21 MG/DL (ref 5–40)

## 2017-12-20 PROBLEM — E03.9 ACQUIRED HYPOTHYROIDISM: Status: ACTIVE | Noted: 2017-12-20

## 2017-12-20 RX ORDER — ERGOCALCIFEROL 1.25 MG/1
CAPSULE ORAL
Qty: 24 CAPSULE | Refills: 1 | Status: SHIPPED | OUTPATIENT
Start: 2017-12-20 | End: 2019-04-23 | Stop reason: DRUGHIGH

## 2017-12-20 RX ORDER — LEVOTHYROXINE SODIUM 0.05 MG/1
50 TABLET ORAL DAILY
Qty: 30 TABLET | Refills: 5 | Status: SHIPPED | OUTPATIENT
Start: 2017-12-20 | End: 2018-12-04 | Stop reason: HOSPADM

## 2017-12-21 ENCOUNTER — TELEPHONE (OUTPATIENT)
Dept: ENDOCRINOLOGY | Age: 62
End: 2017-12-21

## 2017-12-21 NOTE — TELEPHONE ENCOUNTER
----- Message from JI Mello sent at 12/20/2017  1:45 PM EST -----  Let pt know about labs and med changes.    Spoke with patient and he expressed understanding

## 2017-12-26 ENCOUNTER — TELEPHONE (OUTPATIENT)
Dept: NEUROLOGY | Facility: CLINIC | Age: 62
End: 2017-12-26

## 2017-12-26 NOTE — TELEPHONE ENCOUNTER
I s/w patient and let him know his hypercoag labs were normal and did not show any obv clotting disorder. He is scheduled with Alisia on 3/9/18 at 10am for 3 month hospital f/u.

## 2017-12-26 NOTE — TELEPHONE ENCOUNTER
----- Message from JI Dee sent at 12/19/2017  9:12 AM EST -----  Please let patient know that his hypercoag labs are normal; no obv. Clotting disorder.Follow up with Thomas in 3 months.

## 2018-01-04 ENCOUNTER — PRIOR AUTHORIZATION (OUTPATIENT)
Dept: FAMILY MEDICINE CLINIC | Facility: CLINIC | Age: 63
End: 2018-01-04

## 2018-01-04 NOTE — TELEPHONE ENCOUNTER
PA request for Alma Delia Chu denied; patient has to have documentation of trial and failure or contraindication of Basaglar insulin first; please advise patient

## 2018-01-18 ENCOUNTER — OFFICE VISIT (OUTPATIENT)
Dept: FAMILY MEDICINE CLINIC | Facility: CLINIC | Age: 63
End: 2018-01-18

## 2018-01-18 VITALS
BODY MASS INDEX: 23.72 KG/M2 | SYSTOLIC BLOOD PRESSURE: 179 MMHG | HEIGHT: 73 IN | HEART RATE: 80 BPM | RESPIRATION RATE: 16 BRPM | TEMPERATURE: 96.8 F | DIASTOLIC BLOOD PRESSURE: 94 MMHG | WEIGHT: 179 LBS

## 2018-01-18 DIAGNOSIS — I25.9 CHRONIC ISCHEMIC HEART DISEASE: ICD-10-CM

## 2018-01-18 DIAGNOSIS — E11.3399 TYPE 2 DIABETES MELLITUS WITH MODERATE NONPROLIFERATIVE RETINOPATHY WITHOUT MACULAR EDEMA, WITH LONG-TERM CURRENT USE OF INSULIN, UNSPECIFIED LATERALITY (HCC): Primary | ICD-10-CM

## 2018-01-18 DIAGNOSIS — Z79.4 TYPE 2 DIABETES MELLITUS WITH MODERATE NONPROLIFERATIVE RETINOPATHY WITHOUT MACULAR EDEMA, WITH LONG-TERM CURRENT USE OF INSULIN, UNSPECIFIED LATERALITY (HCC): Primary | ICD-10-CM

## 2018-01-18 DIAGNOSIS — E78.49 OTHER HYPERLIPIDEMIA: ICD-10-CM

## 2018-01-18 DIAGNOSIS — I10 ESSENTIAL HYPERTENSION: ICD-10-CM

## 2018-01-18 PROCEDURE — 99214 OFFICE O/P EST MOD 30 MIN: CPT | Performed by: FAMILY MEDICINE

## 2018-01-18 RX ORDER — AMLODIPINE BESYLATE 5 MG/1
5 TABLET ORAL DAILY
Qty: 30 TABLET | Refills: 5 | Status: SHIPPED | OUTPATIENT
Start: 2018-01-18 | End: 2018-08-09 | Stop reason: SDUPTHER

## 2018-01-18 RX ORDER — INSULIN GLARGINE 100 [IU]/ML
40 INJECTION, SOLUTION SUBCUTANEOUS DAILY
Qty: 4 PEN | Refills: 2 | Status: SHIPPED | OUTPATIENT
Start: 2018-01-18 | End: 2018-02-20 | Stop reason: HOSPADM

## 2018-01-18 RX ORDER — VALSARTAN 320 MG/1
320 TABLET ORAL DAILY
Qty: 30 TABLET | Refills: 5 | Status: SHIPPED | OUTPATIENT
Start: 2018-01-18 | End: 2018-07-31

## 2018-01-18 NOTE — PROGRESS NOTES
"Chief Complaint   Patient presents with   • Hyperlipidemia   • Hypertension   • Diabetes     Insurance won't cover Toujeo       Subjective   This patient presents the office to reevaluate his medical conditions.  Blood pressure is not controlled.  He has had some noncompliance issues with his valsartan.  He will continue that medication daily and we will add amlodipine to his current regimen.    Insurance does not cover his long-term insulin and we will make changes.  He will follow-up with his endocrinology for long-term therapy for his diabetes.  Appointment in one month to recheck blood pressure.  I have reviewed and updated his medications, medical history and problem list during today's office visit.        Social History   Substance Use Topics   • Smoking status: Never Smoker   • Smokeless tobacco: Never Used   • Alcohol use No       Review of Systems   Constitutional: Negative for fatigue.   Respiratory: Positive for shortness of breath.    Cardiovascular: Negative for chest pain.       Objective   /94  Pulse 80  Temp 96.8 °F (36 °C) (Oral)   Resp 16  Ht 185.4 cm (72.99\")  Wt 81.2 kg (179 lb)  BMI 23.62 kg/m2  Body mass index is 23.62 kg/(m^2).  Physical Exam   Constitutional: He is cooperative. No distress.   Eyes: Conjunctivae and lids are normal.   Neck: Carotid bruit is not present. No tracheal deviation present.   Cardiovascular: Normal rate, regular rhythm and normal heart sounds.    No murmur heard.  Pulmonary/Chest: Effort normal and breath sounds normal.   Neurological: He is alert. He is not disoriented.   Skin: Skin is warm and dry.   Psychiatric: He has a normal mood and affect. His speech is normal and behavior is normal.   Vitals reviewed.      Data Reviewed:        Assessment/Plan     Problem List Items Addressed This Visit        Cardiovascular and Mediastinum    Chronic ischemic heart disease    Relevant Medications    amLODIPine (NORVASC) 5 MG tablet    Essential hypertension    " Relevant Medications    amLODIPine (NORVASC) 5 MG tablet    valsartan (DIOVAN) 320 MG tablet    Hyperlipidemia       Endocrine    Diabetes mellitus - Primary    Relevant Medications    Insulin Glargine (BASAGLAR KWIKPEN) 100 UNIT/ML injection pen          Outpatient Encounter Prescriptions as of 1/18/2018   Medication Sig Dispense Refill   • aspirin  MG EC tablet Take 1 tablet by mouth daily.     • atorvastatin (LIPITOR) 80 MG tablet Take 1 tablet by mouth Daily. 30 tablet 5   • buPROPion XL (WELLBUTRIN XL) 150 MG 24 hr tablet Take 1 tablet by mouth Daily for 180 days. 30 tablet 5   • docusate sodium (COLACE) 100 MG capsule Take 1 capsule by mouth 2 (two) times a day. (Patient taking differently: Take 100 mg by mouth Daily.) 30 capsule 0   • insulin aspart (novoLOG FLEXPEN) 100 UNIT/ML solution pen-injector sc pen Inject  under the skin 3 (Three) Times a Day With Meals. Pen found in pt belongs - unsure dose/frequency     • Insulin Pen Needle (B-D ULTRAFINE III SHORT PEN) 31G X 8 MM misc Use to inject 4x daily 150 each 5   • levothyroxine (SYNTHROID) 50 MCG tablet Take 1 tablet by mouth Daily. 30 tablet 5   • metoprolol tartrate (LOPRESSOR) 100 MG tablet Take 1 tablet by mouth Every 12 (Twelve) Hours.     • RELION GLUCOSE TEST STRIPS test strip 1 each by Other route 2 (two) times a day. Use as instructed     • SAXagliptin-MetFORMIN ER 2.5-1000 MG tablet sustained-release 24 hour Take 2 tablets by mouth Daily. Pt taking 50/100 - 2 tablets with evening meal 60 tablet 5   • valsartan (DIOVAN) 320 MG tablet Take 1 tablet by mouth Daily for 180 days. 30 tablet 5   • vitamin D (ERGOCALCIFEROL) 57559 units capsule capsule Take 1 cap 3 times weekly 24 capsule 1   • [DISCONTINUED] TOUJEO SOLOSTAR 300 UNIT/ML solution pen-injector Inject 40 Units under the skin Daily. 3 pen 5   • [DISCONTINUED] valsartan (DIOVAN) 320 MG tablet Take 1 tablet by mouth Daily for 180 days. 30 tablet 5   • amLODIPine (NORVASC) 5 MG tablet  Take 1 tablet by mouth Daily for 180 days. 30 tablet 5   • Insulin Glargine (BASAGLAR KWIKPEN) 100 UNIT/ML injection pen Inject 40 Units under the skin Daily for 90 days. 4 pen 2     No facility-administered encounter medications on file as of 1/18/2018.        No orders of the defined types were placed in this encounter.             F/U in one month

## 2018-02-16 ENCOUNTER — HOSPITAL ENCOUNTER (INPATIENT)
Facility: HOSPITAL | Age: 63
LOS: 4 days | Discharge: HOME | End: 2018-02-20
Attending: EMERGENCY MEDICINE | Admitting: INTERNAL MEDICINE

## 2018-02-16 ENCOUNTER — APPOINTMENT (OUTPATIENT)
Dept: GENERAL RADIOLOGY | Facility: HOSPITAL | Age: 63
End: 2018-02-16

## 2018-02-16 ENCOUNTER — APPOINTMENT (OUTPATIENT)
Dept: CT IMAGING | Facility: HOSPITAL | Age: 63
End: 2018-02-16

## 2018-02-16 DIAGNOSIS — I16.0 HYPERTENSIVE URGENCY: Primary | ICD-10-CM

## 2018-02-16 DIAGNOSIS — F32.5 MAJOR DEPRESSIVE DISORDER WITH SINGLE EPISODE, IN FULL REMISSION (HCC): ICD-10-CM

## 2018-02-16 DIAGNOSIS — G93.40 ENCEPHALOPATHY ACUTE: ICD-10-CM

## 2018-02-16 DIAGNOSIS — Z74.09 IMPAIRED MOBILITY: ICD-10-CM

## 2018-02-16 LAB
ABO GROUP BLD: NORMAL
ALBUMIN SERPL-MCNC: 3.7 G/DL (ref 3.5–5.2)
ALBUMIN/GLOB SERPL: 1.2 G/DL
ALP SERPL-CCNC: 83 U/L (ref 39–117)
ALT SERPL W P-5'-P-CCNC: 14 U/L (ref 1–41)
ANION GAP SERPL CALCULATED.3IONS-SCNC: 15.4 MMOL/L
APTT PPP: 22.8 SECONDS (ref 22.7–35.4)
ARTICHOKE IGE QN: 220 MG/DL (ref 0–100)
AST SERPL-CCNC: 15 U/L (ref 1–40)
BASOPHILS # BLD AUTO: 0.01 10*3/MM3 (ref 0–0.2)
BASOPHILS NFR BLD AUTO: 0.2 % (ref 0–1.5)
BILIRUB SERPL-MCNC: 0.2 MG/DL (ref 0.1–1.2)
BLD GP AB SCN SERPL QL: NEGATIVE
BUN BLD-MCNC: 20 MG/DL (ref 8–23)
BUN/CREAT SERPL: 14.5 (ref 7–25)
CALCIUM SPEC-SCNC: 9.7 MG/DL (ref 8.6–10.5)
CHLORIDE SERPL-SCNC: 91 MMOL/L (ref 98–107)
CHOLEST SERPL-MCNC: 331 MG/DL (ref 0–200)
CO2 SERPL-SCNC: 24.6 MMOL/L (ref 22–29)
CREAT BLD-MCNC: 1.38 MG/DL (ref 0.76–1.27)
DEPRECATED RDW RBC AUTO: 39.3 FL (ref 37–54)
EOSINOPHIL # BLD AUTO: 0.11 10*3/MM3 (ref 0–0.7)
EOSINOPHIL NFR BLD AUTO: 1.7 % (ref 0.3–6.2)
ERYTHROCYTE [DISTWIDTH] IN BLOOD BY AUTOMATED COUNT: 13.8 % (ref 11.5–14.5)
GFR SERPL CREATININE-BSD FRML MDRD: 52 ML/MIN/1.73
GLOBULIN UR ELPH-MCNC: 3.1 GM/DL
GLUCOSE BLD-MCNC: 488 MG/DL (ref 65–99)
HCT VFR BLD AUTO: 37.5 % (ref 40.4–52.2)
HDLC SERPL-MCNC: 47 MG/DL (ref 40–60)
HGB BLD-MCNC: 12.3 G/DL (ref 13.7–17.6)
HOLD SPECIMEN: NORMAL
HOLD SPECIMEN: NORMAL
IMM GRANULOCYTES # BLD: 0.02 10*3/MM3 (ref 0–0.03)
IMM GRANULOCYTES NFR BLD: 0.3 % (ref 0–0.5)
INR PPP: 0.98 (ref 0.9–1.1)
LDLC SERPL CALC-MCNC: ABNORMAL MG/DL (ref 0–100)
LDLC/HDLC SERPL: ABNORMAL {RATIO}
LYMPHOCYTES # BLD AUTO: 1.49 10*3/MM3 (ref 0.9–4.8)
LYMPHOCYTES NFR BLD AUTO: 22.5 % (ref 19.6–45.3)
MAGNESIUM SERPL-MCNC: 1.6 MG/DL (ref 1.6–2.4)
MCH RBC QN AUTO: 25.9 PG (ref 27–32.7)
MCHC RBC AUTO-ENTMCNC: 32.8 G/DL (ref 32.6–36.4)
MCV RBC AUTO: 78.9 FL (ref 79.8–96.2)
MONOCYTES # BLD AUTO: 0.35 10*3/MM3 (ref 0.2–1.2)
MONOCYTES NFR BLD AUTO: 5.3 % (ref 5–12)
NEUTROPHILS # BLD AUTO: 4.63 10*3/MM3 (ref 1.9–8.1)
NEUTROPHILS NFR BLD AUTO: 70 % (ref 42.7–76)
PLATELET # BLD AUTO: 262 10*3/MM3 (ref 140–500)
PMV BLD AUTO: 10.9 FL (ref 6–12)
POTASSIUM BLD-SCNC: 4.4 MMOL/L (ref 3.5–5.2)
PROCALCITONIN SERPL-MCNC: 0.04 NG/ML (ref 0.1–0.25)
PROT SERPL-MCNC: 6.8 G/DL (ref 6–8.5)
PROTHROMBIN TIME: 12.7 SECONDS (ref 11.7–14.2)
RBC # BLD AUTO: 4.75 10*6/MM3 (ref 4.6–6)
RH BLD: POSITIVE
SODIUM BLD-SCNC: 131 MMOL/L (ref 136–145)
TRIGL SERPL-MCNC: 416 MG/DL (ref 0–150)
TROPONIN T SERPL-MCNC: 0.02 NG/ML (ref 0–0.03)
VLDLC SERPL-MCNC: ABNORMAL MG/DL (ref 5–40)
WBC NRBC COR # BLD: 6.61 10*3/MM3 (ref 4.5–10.7)
WHOLE BLOOD HOLD SPECIMEN: NORMAL
WHOLE BLOOD HOLD SPECIMEN: NORMAL

## 2018-02-16 PROCEDURE — 70496 CT ANGIOGRAPHY HEAD: CPT

## 2018-02-16 PROCEDURE — 99291 CRITICAL CARE FIRST HOUR: CPT

## 2018-02-16 PROCEDURE — 81001 URINALYSIS AUTO W/SCOPE: CPT | Performed by: INTERNAL MEDICINE

## 2018-02-16 PROCEDURE — 93005 ELECTROCARDIOGRAM TRACING: CPT | Performed by: EMERGENCY MEDICINE

## 2018-02-16 PROCEDURE — 87086 URINE CULTURE/COLONY COUNT: CPT | Performed by: INTERNAL MEDICINE

## 2018-02-16 PROCEDURE — 83721 ASSAY OF BLOOD LIPOPROTEIN: CPT | Performed by: INTERNAL MEDICINE

## 2018-02-16 PROCEDURE — 70498 CT ANGIOGRAPHY NECK: CPT

## 2018-02-16 PROCEDURE — 85610 PROTHROMBIN TIME: CPT | Performed by: EMERGENCY MEDICINE

## 2018-02-16 PROCEDURE — 84484 ASSAY OF TROPONIN QUANT: CPT | Performed by: EMERGENCY MEDICINE

## 2018-02-16 PROCEDURE — 80053 COMPREHEN METABOLIC PANEL: CPT | Performed by: EMERGENCY MEDICINE

## 2018-02-16 PROCEDURE — 0 IOPAMIDOL PER 1 ML: Performed by: EMERGENCY MEDICINE

## 2018-02-16 PROCEDURE — 85730 THROMBOPLASTIN TIME PARTIAL: CPT | Performed by: EMERGENCY MEDICINE

## 2018-02-16 PROCEDURE — 82565 ASSAY OF CREATININE: CPT

## 2018-02-16 PROCEDURE — 80061 LIPID PANEL: CPT | Performed by: INTERNAL MEDICINE

## 2018-02-16 PROCEDURE — 86850 RBC ANTIBODY SCREEN: CPT | Performed by: EMERGENCY MEDICINE

## 2018-02-16 PROCEDURE — 86901 BLOOD TYPING SEROLOGIC RH(D): CPT | Performed by: EMERGENCY MEDICINE

## 2018-02-16 PROCEDURE — 71045 X-RAY EXAM CHEST 1 VIEW: CPT

## 2018-02-16 PROCEDURE — 84145 PROCALCITONIN (PCT): CPT | Performed by: INTERNAL MEDICINE

## 2018-02-16 PROCEDURE — 93010 ELECTROCARDIOGRAM REPORT: CPT | Performed by: INTERNAL MEDICINE

## 2018-02-16 PROCEDURE — 86900 BLOOD TYPING SEROLOGIC ABO: CPT | Performed by: EMERGENCY MEDICINE

## 2018-02-16 PROCEDURE — 25010000002 ONDANSETRON PER 1 MG: Performed by: EMERGENCY MEDICINE

## 2018-02-16 PROCEDURE — 85025 COMPLETE CBC W/AUTO DIFF WBC: CPT | Performed by: EMERGENCY MEDICINE

## 2018-02-16 PROCEDURE — 83735 ASSAY OF MAGNESIUM: CPT | Performed by: INTERNAL MEDICINE

## 2018-02-16 PROCEDURE — 0042T HC CT CEREBRAL PERFUSION W/WO CONTRAST: CPT

## 2018-02-16 RX ORDER — ONDANSETRON 2 MG/ML
4 INJECTION INTRAMUSCULAR; INTRAVENOUS ONCE
Status: COMPLETED | OUTPATIENT
Start: 2018-02-16 | End: 2018-02-16

## 2018-02-16 RX ORDER — SODIUM CHLORIDE 0.9 % (FLUSH) 0.9 %
1-10 SYRINGE (ML) INJECTION AS NEEDED
Status: DISCONTINUED | OUTPATIENT
Start: 2018-02-16 | End: 2018-02-20 | Stop reason: HOSPADM

## 2018-02-16 RX ORDER — SODIUM CHLORIDE 0.9 % (FLUSH) 0.9 %
10 SYRINGE (ML) INJECTION AS NEEDED
Status: DISCONTINUED | OUTPATIENT
Start: 2018-02-16 | End: 2018-02-20 | Stop reason: HOSPADM

## 2018-02-16 RX ORDER — ONDANSETRON 2 MG/ML
4 INJECTION INTRAMUSCULAR; INTRAVENOUS EVERY 6 HOURS PRN
Status: DISCONTINUED | OUTPATIENT
Start: 2018-02-16 | End: 2018-02-20 | Stop reason: HOSPADM

## 2018-02-16 RX ADMIN — ONDANSETRON 4 MG: 2 INJECTION INTRAMUSCULAR; INTRAVENOUS at 20:51

## 2018-02-16 RX ADMIN — NICARDIPINE HYDROCHLORIDE 5 MG/HR: 2.5 INJECTION INTRAVENOUS at 21:17

## 2018-02-16 RX ADMIN — IOPAMIDOL 150 ML: 755 INJECTION, SOLUTION INTRAVENOUS at 20:46

## 2018-02-17 ENCOUNTER — APPOINTMENT (OUTPATIENT)
Dept: MRI IMAGING | Facility: HOSPITAL | Age: 63
End: 2018-02-17

## 2018-02-17 ENCOUNTER — APPOINTMENT (OUTPATIENT)
Dept: CARDIOLOGY | Facility: HOSPITAL | Age: 63
End: 2018-02-17
Attending: PSYCHIATRY & NEUROLOGY

## 2018-02-17 LAB
AMPHET+METHAMPHET UR QL: NEGATIVE
ANION GAP SERPL CALCULATED.3IONS-SCNC: 10.7 MMOL/L
ANION GAP SERPL CALCULATED.3IONS-SCNC: 14.8 MMOL/L
BACTERIA UR QL AUTO: NORMAL /HPF
BARBITURATES UR QL SCN: NEGATIVE
BASOPHILS # BLD AUTO: 0 10*3/MM3 (ref 0–0.2)
BASOPHILS # BLD AUTO: 0 10*3/MM3 (ref 0–0.2)
BASOPHILS NFR BLD AUTO: 0 % (ref 0–1.5)
BASOPHILS NFR BLD AUTO: 0 % (ref 0–1.5)
BENZODIAZ UR QL SCN: NEGATIVE
BH CV ECHO MEAS - ACS: 2.2 CM
BH CV ECHO MEAS - AO MEAN PG (FULL): 2 MMHG
BH CV ECHO MEAS - AO MEAN PG: 4 MMHG
BH CV ECHO MEAS - AO ROOT AREA (BSA CORRECTED): 1.4
BH CV ECHO MEAS - AO ROOT AREA: 7.5 CM^2
BH CV ECHO MEAS - AO ROOT DIAM: 3.1 CM
BH CV ECHO MEAS - AO V2 MAX: 141 CM/SEC
BH CV ECHO MEAS - AO V2 MEAN: 97.6 CM/SEC
BH CV ECHO MEAS - AO V2 VTI: 29.7 CM
BH CV ECHO MEAS - AVA(I,A): 2 CM^2
BH CV ECHO MEAS - AVA(I,D): 2 CM^2
BH CV ECHO MEAS - BSA(HAYCOCK): 2.2 M^2
BH CV ECHO MEAS - BSA: 2.2 M^2
BH CV ECHO MEAS - BZI_BMI: 26.8 KILOGRAMS/M^2
BH CV ECHO MEAS - BZI_METRIC_HEIGHT: 185.4 CM
BH CV ECHO MEAS - BZI_METRIC_WEIGHT: 92.1 KG
BH CV ECHO MEAS - CONTRAST EF (2CH): 60.4 ML/M^2
BH CV ECHO MEAS - CONTRAST EF 4CH: 62.9 ML/M^2
BH CV ECHO MEAS - EDV(CUBED): 117.6 ML
BH CV ECHO MEAS - EDV(MOD-SP2): 139 ML
BH CV ECHO MEAS - EDV(MOD-SP4): 116 ML
BH CV ECHO MEAS - EDV(TEICH): 112.8 ML
BH CV ECHO MEAS - EF(CUBED): 66.6 %
BH CV ECHO MEAS - EF(MOD-SP2): 60.4 %
BH CV ECHO MEAS - EF(MOD-SP4): 62.9 %
BH CV ECHO MEAS - EF(TEICH): 58 %
BH CV ECHO MEAS - ESV(CUBED): 39.3 ML
BH CV ECHO MEAS - ESV(MOD-SP2): 55 ML
BH CV ECHO MEAS - ESV(MOD-SP4): 43 ML
BH CV ECHO MEAS - ESV(TEICH): 47.4 ML
BH CV ECHO MEAS - FS: 30.6 %
BH CV ECHO MEAS - IVS/LVPW: 1
BH CV ECHO MEAS - IVSD: 1.1 CM
BH CV ECHO MEAS - LAT PEAK E' VEL: 8 CM/SEC
BH CV ECHO MEAS - LV DIASTOLIC VOL/BSA (35-75): 53.6 ML/M^2
BH CV ECHO MEAS - LV MASS(C)D: 200.5 GRAMS
BH CV ECHO MEAS - LV MASS(C)DI: 92.6 GRAMS/M^2
BH CV ECHO MEAS - LV MEAN PG: 2 MMHG
BH CV ECHO MEAS - LV SYSTOLIC VOL/BSA (12-30): 19.9 ML/M^2
BH CV ECHO MEAS - LV V1 MAX: 100 CM/SEC
BH CV ECHO MEAS - LV V1 MEAN: 61.5 CM/SEC
BH CV ECHO MEAS - LV V1 VTI: 19 CM
BH CV ECHO MEAS - LVIDD: 4.9 CM
BH CV ECHO MEAS - LVIDS: 3.4 CM
BH CV ECHO MEAS - LVLD AP2: 9.6 CM
BH CV ECHO MEAS - LVLD AP4: 9.7 CM
BH CV ECHO MEAS - LVLS AP2: 8.3 CM
BH CV ECHO MEAS - LVLS AP4: 8.1 CM
BH CV ECHO MEAS - LVOT AREA (M): 3.1 CM^2
BH CV ECHO MEAS - LVOT AREA: 3.1 CM^2
BH CV ECHO MEAS - LVOT DIAM: 2 CM
BH CV ECHO MEAS - LVPWD: 1.1 CM
BH CV ECHO MEAS - MED PEAK E' VEL: 5 CM/SEC
BH CV ECHO MEAS - MR MAX PG: 43.8 MMHG
BH CV ECHO MEAS - MR MAX VEL: 331 CM/SEC
BH CV ECHO MEAS - MV A DUR: 0.11 SEC
BH CV ECHO MEAS - MV A MAX VEL: 65.4 CM/SEC
BH CV ECHO MEAS - MV DEC SLOPE: 434 CM/SEC^2
BH CV ECHO MEAS - MV DEC TIME: 0.16 SEC
BH CV ECHO MEAS - MV E MAX VEL: 115 CM/SEC
BH CV ECHO MEAS - MV E/A: 1.8
BH CV ECHO MEAS - MV MEAN PG: 2 MMHG
BH CV ECHO MEAS - MV P1/2T MAX VEL: 112 CM/SEC
BH CV ECHO MEAS - MV P1/2T: 75.6 MSEC
BH CV ECHO MEAS - MV V2 MEAN: 61.2 CM/SEC
BH CV ECHO MEAS - MV V2 VTI: 32.5 CM
BH CV ECHO MEAS - MVA P1/2T LCG: 2 CM^2
BH CV ECHO MEAS - MVA(P1/2T): 2.9 CM^2
BH CV ECHO MEAS - MVA(VTI): 1.8 CM^2
BH CV ECHO MEAS - PA ACC SLOPE: 9 CM/SEC^2
BH CV ECHO MEAS - PA ACC TIME: 0.13 SEC
BH CV ECHO MEAS - PA MAX PG (FULL): 1.5 MMHG
BH CV ECHO MEAS - PA MAX PG: 6.7 MMHG
BH CV ECHO MEAS - PA PR(ACCEL): 18.7 MMHG
BH CV ECHO MEAS - PA V2 MAX: 129 CM/SEC
BH CV ECHO MEAS - PULM A REVS DUR: 0.13 SEC
BH CV ECHO MEAS - PULM A REVS VEL: 28.1 CM/SEC
BH CV ECHO MEAS - PULM DIAS VEL: 74.9 CM/SEC
BH CV ECHO MEAS - PULM S/D: 0.69
BH CV ECHO MEAS - PULM SYS VEL: 51.5 CM/SEC
BH CV ECHO MEAS - PVA(V,A): 4 CM^2
BH CV ECHO MEAS - PVA(V,D): 4 CM^2
BH CV ECHO MEAS - QP/QS: 1.6
BH CV ECHO MEAS - RAP SYSTOLE: 8 MMHG
BH CV ECHO MEAS - RV MAX PG: 5.1 MMHG
BH CV ECHO MEAS - RV MEAN PG: 3 MMHG
BH CV ECHO MEAS - RV V1 MAX: 113 CM/SEC
BH CV ECHO MEAS - RV V1 MEAN: 72.2 CM/SEC
BH CV ECHO MEAS - RV V1 VTI: 20.8 CM
BH CV ECHO MEAS - RVOT AREA: 4.5 CM^2
BH CV ECHO MEAS - RVOT DIAM: 2.4 CM
BH CV ECHO MEAS - RVSP: 22.1 MMHG
BH CV ECHO MEAS - SI(AO): 103.5 ML/M^2
BH CV ECHO MEAS - SI(CUBED): 36.2 ML/M^2
BH CV ECHO MEAS - SI(LVOT): 27.6 ML/M^2
BH CV ECHO MEAS - SI(MOD-SP2): 38.8 ML/M^2
BH CV ECHO MEAS - SI(MOD-SP4): 33.7 ML/M^2
BH CV ECHO MEAS - SI(TEICH): 30.2 ML/M^2
BH CV ECHO MEAS - SV(AO): 224.2 ML
BH CV ECHO MEAS - SV(CUBED): 78.3 ML
BH CV ECHO MEAS - SV(LVOT): 59.7 ML
BH CV ECHO MEAS - SV(MOD-SP2): 84 ML
BH CV ECHO MEAS - SV(MOD-SP4): 73 ML
BH CV ECHO MEAS - SV(RVOT): 94.1 ML
BH CV ECHO MEAS - SV(TEICH): 65.4 ML
BH CV ECHO MEAS - TAPSE (>1.6): 2.2 CM2
BH CV ECHO MEAS - TR MAX VEL: 188 CM/SEC
BH CV VAS BP RIGHT ARM: NORMAL MMHG
BH CV XLRA - RV BASE: 3.1 CM
BH CV XLRA - TDI S': 13 CM/SEC
BILIRUB UR QL STRIP: NEGATIVE
BUN BLD-MCNC: 20 MG/DL (ref 8–23)
BUN BLD-MCNC: 21 MG/DL (ref 8–23)
BUN/CREAT SERPL: 13.6 (ref 7–25)
BUN/CREAT SERPL: 15.7 (ref 7–25)
CALCIUM SPEC-SCNC: 9 MG/DL (ref 8.6–10.5)
CALCIUM SPEC-SCNC: 9.1 MG/DL (ref 8.6–10.5)
CANNABINOIDS SERPL QL: NEGATIVE
CHLORIDE SERPL-SCNC: 96 MMOL/L (ref 98–107)
CHLORIDE SERPL-SCNC: 96 MMOL/L (ref 98–107)
CLARITY UR: CLEAR
CO2 SERPL-SCNC: 25.2 MMOL/L (ref 22–29)
CO2 SERPL-SCNC: 29.3 MMOL/L (ref 22–29)
COCAINE UR QL: NEGATIVE
COLOR UR: YELLOW
CREAT BLD-MCNC: 1.27 MG/DL (ref 0.76–1.27)
CREAT BLD-MCNC: 1.54 MG/DL (ref 0.76–1.27)
DEPRECATED RDW RBC AUTO: 38.9 FL (ref 37–54)
DEPRECATED RDW RBC AUTO: 40.2 FL (ref 37–54)
E/E' RATIO: 19
EOSINOPHIL # BLD AUTO: 0 10*3/MM3 (ref 0–0.7)
EOSINOPHIL # BLD AUTO: 0 10*3/MM3 (ref 0–0.7)
EOSINOPHIL NFR BLD AUTO: 0 % (ref 0.3–6.2)
EOSINOPHIL NFR BLD AUTO: 0 % (ref 0.3–6.2)
ERYTHROCYTE [DISTWIDTH] IN BLOOD BY AUTOMATED COUNT: 13.8 % (ref 11.5–14.5)
ERYTHROCYTE [DISTWIDTH] IN BLOOD BY AUTOMATED COUNT: 14 % (ref 11.5–14.5)
GFR SERPL CREATININE-BSD FRML MDRD: 46 ML/MIN/1.73
GFR SERPL CREATININE-BSD FRML MDRD: 57 ML/MIN/1.73
GLUCOSE BLD-MCNC: 228 MG/DL (ref 65–99)
GLUCOSE BLD-MCNC: 451 MG/DL (ref 65–99)
GLUCOSE BLDC GLUCOMTR-MCNC: 134 MG/DL (ref 70–130)
GLUCOSE BLDC GLUCOMTR-MCNC: 200 MG/DL (ref 70–130)
GLUCOSE BLDC GLUCOMTR-MCNC: 203 MG/DL (ref 70–130)
GLUCOSE BLDC GLUCOMTR-MCNC: 231 MG/DL (ref 70–130)
GLUCOSE BLDC GLUCOMTR-MCNC: 403 MG/DL (ref 70–130)
GLUCOSE BLDC GLUCOMTR-MCNC: 403 MG/DL (ref 70–130)
GLUCOSE UR STRIP-MCNC: ABNORMAL MG/DL
HBA1C MFR BLD: 11.6 % (ref 4.8–5.6)
HCT VFR BLD AUTO: 34.5 % (ref 40.4–52.2)
HCT VFR BLD AUTO: 36.2 % (ref 40.4–52.2)
HGB BLD-MCNC: 11.3 G/DL (ref 13.7–17.6)
HGB BLD-MCNC: 11.6 G/DL (ref 13.7–17.6)
HGB UR QL STRIP.AUTO: ABNORMAL
HYALINE CASTS UR QL AUTO: NORMAL /LPF
IMM GRANULOCYTES # BLD: 0.02 10*3/MM3 (ref 0–0.03)
IMM GRANULOCYTES # BLD: 0.04 10*3/MM3 (ref 0–0.03)
IMM GRANULOCYTES NFR BLD: 0.1 % (ref 0–0.5)
IMM GRANULOCYTES NFR BLD: 0.2 % (ref 0–0.5)
INR PPP: 1.02 (ref 0.9–1.1)
INR PPP: 1.04 (ref 0.9–1.1)
KETONES UR QL STRIP: ABNORMAL
LEFT ATRIUM VOLUME INDEX: 30 ML/M2
LEUKOCYTE ESTERASE UR QL STRIP.AUTO: NEGATIVE
LYMPHOCYTES # BLD AUTO: 0.86 10*3/MM3 (ref 0.9–4.8)
LYMPHOCYTES # BLD AUTO: 0.95 10*3/MM3 (ref 0.9–4.8)
LYMPHOCYTES NFR BLD AUTO: 5.3 % (ref 19.6–45.3)
LYMPHOCYTES NFR BLD AUTO: 6.9 % (ref 19.6–45.3)
MAGNESIUM SERPL-MCNC: 1.4 MG/DL (ref 1.6–2.4)
MAXIMAL PREDICTED HEART RATE: 158 BPM
MCH RBC QN AUTO: 25.4 PG (ref 27–32.7)
MCH RBC QN AUTO: 25.9 PG (ref 27–32.7)
MCHC RBC AUTO-ENTMCNC: 32 G/DL (ref 32.6–36.4)
MCHC RBC AUTO-ENTMCNC: 32.8 G/DL (ref 32.6–36.4)
MCV RBC AUTO: 79.1 FL (ref 79.8–96.2)
MCV RBC AUTO: 79.4 FL (ref 79.8–96.2)
METHADONE UR QL SCN: NEGATIVE
MONOCYTES # BLD AUTO: 0.4 10*3/MM3 (ref 0.2–1.2)
MONOCYTES # BLD AUTO: 0.47 10*3/MM3 (ref 0.2–1.2)
MONOCYTES NFR BLD AUTO: 2.9 % (ref 5–12)
MONOCYTES NFR BLD AUTO: 2.9 % (ref 5–12)
NEUTROPHILS # BLD AUTO: 12.43 10*3/MM3 (ref 1.9–8.1)
NEUTROPHILS # BLD AUTO: 14.75 10*3/MM3 (ref 1.9–8.1)
NEUTROPHILS NFR BLD AUTO: 90.1 % (ref 42.7–76)
NEUTROPHILS NFR BLD AUTO: 91.6 % (ref 42.7–76)
NITRITE UR QL STRIP: NEGATIVE
OPIATES UR QL: NEGATIVE
OXYCODONE UR QL SCN: NEGATIVE
PH UR STRIP.AUTO: 7.5 [PH] (ref 5–8)
PLATELET # BLD AUTO: 265 10*3/MM3 (ref 140–500)
PLATELET # BLD AUTO: 272 10*3/MM3 (ref 140–500)
PMV BLD AUTO: 10.6 FL (ref 6–12)
PMV BLD AUTO: 10.9 FL (ref 6–12)
POTASSIUM BLD-SCNC: 3.7 MMOL/L (ref 3.5–5.2)
POTASSIUM BLD-SCNC: 3.8 MMOL/L (ref 3.5–5.2)
PROT UR QL STRIP: ABNORMAL
PROTHROMBIN TIME: 13.2 SECONDS (ref 11.7–14.2)
PROTHROMBIN TIME: 13.4 SECONDS (ref 11.7–14.2)
RBC # BLD AUTO: 4.36 10*6/MM3 (ref 4.6–6)
RBC # BLD AUTO: 4.56 10*6/MM3 (ref 4.6–6)
RBC # UR: NORMAL /HPF
REF LAB TEST METHOD: NORMAL
SODIUM BLD-SCNC: 136 MMOL/L (ref 136–145)
SODIUM BLD-SCNC: 136 MMOL/L (ref 136–145)
SP GR UR STRIP: >=1.03 (ref 1–1.03)
SQUAMOUS #/AREA URNS HPF: NORMAL /HPF
STRESS TARGET HR: 134 BPM
TROPONIN T SERPL-MCNC: 0.02 NG/ML (ref 0–0.03)
TROPONIN T SERPL-MCNC: 0.03 NG/ML (ref 0–0.03)
TROPONIN T SERPL-MCNC: 0.04 NG/ML (ref 0–0.03)
UROBILINOGEN UR QL STRIP: ABNORMAL
WBC NRBC COR # BLD: 13.8 10*3/MM3 (ref 4.5–10.7)
WBC NRBC COR # BLD: 16.12 10*3/MM3 (ref 4.5–10.7)
WBC UR QL AUTO: NORMAL /HPF

## 2018-02-17 PROCEDURE — 63710000001 INSULIN ASPART PER 5 UNITS: Performed by: INTERNAL MEDICINE

## 2018-02-17 PROCEDURE — 84484 ASSAY OF TROPONIN QUANT: CPT | Performed by: INTERNAL MEDICINE

## 2018-02-17 PROCEDURE — 83735 ASSAY OF MAGNESIUM: CPT | Performed by: INTERNAL MEDICINE

## 2018-02-17 PROCEDURE — 80307 DRUG TEST PRSMV CHEM ANLYZR: CPT | Performed by: PSYCHIATRY & NEUROLOGY

## 2018-02-17 PROCEDURE — 63710000001 INSULIN DETEMER PER 5 UNITS: Performed by: INTERNAL MEDICINE

## 2018-02-17 PROCEDURE — 85610 PROTHROMBIN TIME: CPT | Performed by: INTERNAL MEDICINE

## 2018-02-17 PROCEDURE — 70551 MRI BRAIN STEM W/O DYE: CPT

## 2018-02-17 PROCEDURE — 99254 IP/OBS CNSLTJ NEW/EST MOD 60: CPT | Performed by: PSYCHIATRY & NEUROLOGY

## 2018-02-17 PROCEDURE — 85025 COMPLETE CBC W/AUTO DIFF WBC: CPT | Performed by: INTERNAL MEDICINE

## 2018-02-17 PROCEDURE — 82962 GLUCOSE BLOOD TEST: CPT

## 2018-02-17 PROCEDURE — 25010000002 MAGNESIUM SULFATE IN D5W 1G/100ML (PREMIX) 1-5 GM/100ML-% SOLUTION: Performed by: INTERNAL MEDICINE

## 2018-02-17 PROCEDURE — 93306 TTE W/DOPPLER COMPLETE: CPT

## 2018-02-17 PROCEDURE — 80048 BASIC METABOLIC PNL TOTAL CA: CPT | Performed by: INTERNAL MEDICINE

## 2018-02-17 PROCEDURE — 92610 EVALUATE SWALLOWING FUNCTION: CPT

## 2018-02-17 PROCEDURE — 25010000002 ONDANSETRON PER 1 MG: Performed by: INTERNAL MEDICINE

## 2018-02-17 PROCEDURE — 83036 HEMOGLOBIN GLYCOSYLATED A1C: CPT | Performed by: INTERNAL MEDICINE

## 2018-02-17 PROCEDURE — 93306 TTE W/DOPPLER COMPLETE: CPT | Performed by: INTERNAL MEDICINE

## 2018-02-17 RX ORDER — CLOPIDOGREL BISULFATE 75 MG/1
75 TABLET ORAL DAILY
Status: DISCONTINUED | OUTPATIENT
Start: 2018-02-17 | End: 2018-02-20 | Stop reason: HOSPADM

## 2018-02-17 RX ORDER — NICOTINE POLACRILEX 4 MG
15 LOZENGE BUCCAL
Status: DISCONTINUED | OUTPATIENT
Start: 2018-02-17 | End: 2018-02-20 | Stop reason: HOSPADM

## 2018-02-17 RX ORDER — DEXTROSE MONOHYDRATE 25 G/50ML
25 INJECTION, SOLUTION INTRAVENOUS
Status: DISCONTINUED | OUTPATIENT
Start: 2018-02-17 | End: 2018-02-20 | Stop reason: HOSPADM

## 2018-02-17 RX ORDER — MAGNESIUM SULFATE HEPTAHYDRATE 40 MG/ML
2 INJECTION, SOLUTION INTRAVENOUS AS NEEDED
Status: DISCONTINUED | OUTPATIENT
Start: 2018-02-17 | End: 2018-02-20 | Stop reason: HOSPADM

## 2018-02-17 RX ORDER — ATORVASTATIN CALCIUM 80 MG/1
80 TABLET, FILM COATED ORAL NIGHTLY
Status: DISCONTINUED | OUTPATIENT
Start: 2018-02-17 | End: 2018-02-20 | Stop reason: HOSPADM

## 2018-02-17 RX ORDER — MAGNESIUM SULFATE HEPTAHYDRATE 40 MG/ML
4 INJECTION, SOLUTION INTRAVENOUS AS NEEDED
Status: DISCONTINUED | OUTPATIENT
Start: 2018-02-17 | End: 2018-02-20 | Stop reason: HOSPADM

## 2018-02-17 RX ORDER — CHOLECALCIFEROL (VITAMIN D3) 125 MCG
1000 CAPSULE ORAL DAILY
Status: DISCONTINUED | OUTPATIENT
Start: 2018-02-17 | End: 2018-02-20 | Stop reason: HOSPADM

## 2018-02-17 RX ORDER — MAGNESIUM SULFATE 1 G/100ML
1 INJECTION INTRAVENOUS
Status: COMPLETED | OUTPATIENT
Start: 2018-02-17 | End: 2018-02-17

## 2018-02-17 RX ORDER — POTASSIUM CHLORIDE 7.45 MG/ML
10 INJECTION INTRAVENOUS
Status: DISCONTINUED | OUTPATIENT
Start: 2018-02-17 | End: 2018-02-20 | Stop reason: HOSPADM

## 2018-02-17 RX ORDER — ASPIRIN 81 MG/1
81 TABLET ORAL DAILY
Status: DISCONTINUED | OUTPATIENT
Start: 2018-02-18 | End: 2018-02-20 | Stop reason: HOSPADM

## 2018-02-17 RX ORDER — ASPIRIN 300 MG/1
300 SUPPOSITORY RECTAL DAILY
Status: DISCONTINUED | OUTPATIENT
Start: 2018-02-17 | End: 2018-02-17

## 2018-02-17 RX ORDER — ASPIRIN 325 MG
325 TABLET ORAL DAILY
Status: DISCONTINUED | OUTPATIENT
Start: 2018-02-17 | End: 2018-02-17

## 2018-02-17 RX ORDER — SODIUM CHLORIDE 0.9 % (FLUSH) 0.9 %
1-10 SYRINGE (ML) INJECTION AS NEEDED
Status: DISCONTINUED | OUTPATIENT
Start: 2018-02-17 | End: 2018-02-20 | Stop reason: HOSPADM

## 2018-02-17 RX ORDER — AMLODIPINE BESYLATE 5 MG/1
5 TABLET ORAL
Status: DISCONTINUED | OUTPATIENT
Start: 2018-02-17 | End: 2018-02-20 | Stop reason: HOSPADM

## 2018-02-17 RX ADMIN — INSULIN ASPART 5 UNITS: 100 INJECTION, SOLUTION INTRAVENOUS; SUBCUTANEOUS at 08:02

## 2018-02-17 RX ADMIN — INSULIN ASPART 5 UNITS: 100 INJECTION, SOLUTION INTRAVENOUS; SUBCUTANEOUS at 20:44

## 2018-02-17 RX ADMIN — ATORVASTATIN CALCIUM 80 MG: 80 TABLET, FILM COATED ORAL at 20:44

## 2018-02-17 RX ADMIN — INSULIN DETEMIR 20 UNITS: 100 INJECTION, SOLUTION SUBCUTANEOUS at 00:29

## 2018-02-17 RX ADMIN — NICARDIPINE HYDROCHLORIDE 3 MG/HR: 2.5 INJECTION INTRAVENOUS at 03:46

## 2018-02-17 RX ADMIN — ONDANSETRON 4 MG: 2 INJECTION INTRAMUSCULAR; INTRAVENOUS at 02:47

## 2018-02-17 RX ADMIN — ASPIRIN 300 MG: 300 SUPPOSITORY RECTAL at 09:37

## 2018-02-17 RX ADMIN — MAGNESIUM SULFATE HEPTAHYDRATE 1 G: 1 INJECTION, SOLUTION INTRAVENOUS at 04:00

## 2018-02-17 RX ADMIN — INSULIN DETEMIR 20 UNITS: 100 INJECTION, SOLUTION SUBCUTANEOUS at 20:45

## 2018-02-17 RX ADMIN — INSULIN ASPART 14 UNITS: 100 INJECTION, SOLUTION INTRAVENOUS; SUBCUTANEOUS at 01:30

## 2018-02-17 RX ADMIN — MAGNESIUM SULFATE HEPTAHYDRATE 1 G: 1 INJECTION, SOLUTION INTRAVENOUS at 03:09

## 2018-02-17 RX ADMIN — CLOPIDOGREL 75 MG: 75 TABLET, FILM COATED ORAL at 18:26

## 2018-02-17 NOTE — ED PROVIDER NOTES
EMERGENCY DEPARTMENT ENCOUNTER    CHIEF COMPLAINT  Chief Complaint: AMS  History given by: EMS   History limited by: mental status change, aphasia  Room Number: 36/36  PMD: García Rehman MD      HPI:  Pt is a 62 y.o. male who presents with reports of AMS. Per EMS, the pt has a Hx of TIA, and state that they were called as the pt's family reports that the pt's symptoms are similar to his prior TIA. Per EMS, pt was last seen normal by his family at 1330 today.     Duration:  Last known normal at 1330 today   Onset: unknown   Timing: constant   Quality: aphasia   Intensity/Severity: severe  Progression: unchanged   Associated Symptoms: none stated   Aggravating Factors: none stated   Alleviating Factors: none stated   Previous Episodes: pt's symptoms similar compared to prior TIA   Treatment before arrival: none    PAST MEDICAL HISTORY  Active Ambulatory Problems     Diagnosis Date Noted   • Depression    • Hyperlipidemia    • Chronic ischemic heart disease    • Vitamin D deficiency 12/17/2015   • Erectile dysfunction 12/17/2015   • Chronic fatigue 04/25/2016   • Diabetes mellitus 04/25/2016   • Skin lesion of chest wall 06/20/2016   • Slow transit constipation 09/01/2016   • Atherosclerosis of coronary artery 10/16/2012   • Benign prostatic hyperplasia 12/20/2016   • Chronic kidney disease 12/20/2016   • History of basal cell carcinoma 12/20/2016   • Essential hypertension 12/20/2016   • Acquired hypothyroidism 12/20/2017     Resolved Ambulatory Problems     Diagnosis Date Noted   • Anemia    • Arthritis    • Diabetes mellitus out of control    • Type 2 diabetes mellitus    • Acute sinusitis    • Accumulation of fluid in tissues 12/17/2015   • Fatigue 12/17/2015   • Cardiomyopathy, ischemic 12/17/2015   • Acute appendicitis 03/02/2016   • Altered mental status 11/30/2017     Past Medical History:   Diagnosis Date   • Acute sinusitis    • Anemia    • Arthritis    • Chronic ischemic heart disease    • Depression    •  Diabetes mellitus type 2, uncontrolled, without complications    • Heart attack    • Hyperlipidemia    • Hypertension    • Screening for prostate cancer 2011   • TIA (transient ischemic attack)    • Type 2 diabetes mellitus    • Vitamin D deficiency        PAST SURGICAL HISTORY  Past Surgical History:   Procedure Laterality Date   • APPENDECTOMY N/A 02/14/2016    Dr. Alexey Dodson   • CORONARY ARTERY BYPASS GRAFT  11/2001       FAMILY HISTORY  Family History   Problem Relation Age of Onset   • Diabetes Mother    • Hypertension Mother    • Macular degeneration Mother    • Alcohol abuse Father    • Cancer Father      lung   • Alcohol abuse Brother        SOCIAL HISTORY  Social History     Social History   • Marital status:      Spouse name: N/A   • Number of children: N/A   • Years of education: N/A     Occupational History   • Not on file.     Social History Main Topics   • Smoking status: Never Smoker   • Smokeless tobacco: Never Used   • Alcohol use No   • Drug use: No   • Sexual activity: Defer     Other Topics Concern   • Not on file     Social History Narrative       ALLERGIES  Prozac [fluoxetine hcl]    REVIEW OF SYSTEMS  Review of Systems   Unable to perform ROS: Mental status change       PHYSICAL EXAM  ED Triage Vitals   Temp Pulse Resp BP SpO2   -- -- -- -- --             Temp src Heart Rate Source Patient Position BP Location FiO2 (%)   -- -- -- -- --              Physical Exam   Constitutional: He is well-developed, well-nourished, and in no distress.   HENT:   Head: Normocephalic and atraumatic.   Eyes: EOM are normal. Pupils are equal, round, and reactive to light.   Neck: Normal range of motion. Neck supple.   Cardiovascular: Normal rate, regular rhythm and normal heart sounds.    Pulmonary/Chest: Effort normal and breath sounds normal. No respiratory distress.   Abdominal: Soft. There is no tenderness. There is no rebound and no guarding.   Musculoskeletal: Normal range of motion. He  exhibits no edema.   Neurological: He is alert. He has normal sensation and normal strength.   Pt does not follow commands; and withdraws to noxious stimuli.    Skin: Skin is warm and dry.   Psychiatric: Mood and affect normal.   Nursing note and vitals reviewed.      LAB RESULTS  Lab Results (last 24 hours)     Procedure Component Value Units Date/Time    CBC & Differential [[id number]] Collected:  02/16/18 2019    Specimen:  Blood Updated:  02/16/18 2027    Narrative:       The following orders were created for panel order CBC & Differential.  Procedure                               Abnormality         Status                     ---------                               -----------         ------                     CBC Auto Differential[[id number]]        Abnormal            Final result                 Please view results for these tests on the individual orders.    CBC Auto Differential [[id number]]  (Abnormal) Collected:  02/16/18 2019    Specimen:  Blood Updated:  02/16/18 2027     WBC 6.61 10*3/mm3      RBC 4.75 10*6/mm3      Hemoglobin 12.3 (L) g/dL      Hematocrit 37.5 (L) %      MCV 78.9 (L) fL      MCH 25.9 (L) pg      MCHC 32.8 g/dL      RDW 13.8 %      RDW-SD 39.3 fl      MPV 10.9 fL      Platelets 262 10*3/mm3      Neutrophil % 70.0 %      Lymphocyte % 22.5 %      Monocyte % 5.3 %      Eosinophil % 1.7 %      Basophil % 0.2 %      Immature Grans % 0.3 %      Neutrophils, Absolute 4.63 10*3/mm3      Lymphocytes, Absolute 1.49 10*3/mm3      Monocytes, Absolute 0.35 10*3/mm3      Eosinophils, Absolute 0.11 10*3/mm3      Basophils, Absolute 0.01 10*3/mm3      Immature Grans, Absolute 0.02 10*3/mm3     Comprehensive Metabolic Panel [[id number]]  (Abnormal) Collected:  02/16/18 2020    Specimen:  Blood Updated:  02/16/18 2104     Glucose 488 (C) mg/dL      BUN 20 mg/dL      Creatinine 1.38 (H) mg/dL      Sodium 131 (L) mmol/L      Potassium 4.4 mmol/L      Chloride 91 (L) mmol/L      CO2 24.6 mmol/L      Calcium  9.7 mg/dL      Total Protein 6.8 g/dL      Albumin 3.70 g/dL      ALT (SGPT) 14 U/L      AST (SGOT) 15 U/L      Alkaline Phosphatase 83 U/L      Total Bilirubin 0.2 mg/dL      eGFR Non African Amer 52 (L) mL/min/1.73      Globulin 3.1 gm/dL      A/G Ratio 1.2 g/dL      BUN/Creatinine Ratio 14.5     Anion Gap 15.4 mmol/L     Protime-INR [[id number]]  (Normal) Collected:  02/16/18 2020    Specimen:  Blood Updated:  02/16/18 2038     Protime 12.7 Seconds      INR 0.98    aPTT [[id number]]  (Normal) Collected:  02/16/18 2020    Specimen:  Blood Updated:  02/16/18 2038     PTT 22.8 seconds     Troponin [[id number]]  (Normal) Collected:  02/16/18 2020    Specimen:  Blood Updated:  02/16/18 2059     Troponin T 0.023 ng/mL     Narrative:       Troponin T Reference Ranges:  Less than 0.03 ng/mL:    Negative for AMI  0.03 to 0.09 ng/mL:      Indeterminant for AMI  Greater than 0.09 ng/mL: Positive for AMI          I ordered the above labs and reviewed the results    RADIOLOGY  XR Chest 1 View   Preliminary Result   Pulmonary congestion, no definite consolidation or effusion.              CT Angiogram Head With & Without Contrast    (Results Pending)   CT Angiogram Neck With & Without Contrast    (Results Pending)   CT Cerebral Perfusion With & Without Contrast    (Results Pending)        I ordered the above noted radiological studies. Interpreted by radiologist. Discussed with radiologist (Dr. Aldana). Reviewed by me in PACS.       PROCEDURES  Critical Care  Performed by: BELTRAN KEITH  Authorized by: BELTRAN KEITH     Critical care provider statement:     Critical care time (minutes):  40    Critical care was necessary to treat or prevent imminent or life-threatening deterioration of the following conditions:  CNS failure or compromise    Critical care was time spent personally by me on the following activities:  Blood draw for specimens, development of treatment plan with patient or surrogate, discussions with consultants,  evaluation of patient's response to treatment, examination of patient, obtaining history from patient or surrogate, review of old charts, re-evaluation of patient's condition, pulse oximetry, ordering and review of radiographic studies, ordering and review of laboratory studies and ordering and performing treatments and interventions        Interval: baseline at presentation    1a. Level Of Consciousness: 0-->Alert: keenly responsive  1b. LOC Questions: 2-->Answers neither question correctly  1c. LOC Commands: 2-->Performs neither task correctly  2. Best Gaze: 0-->Normal  3. Visual: 0-->No visual loss (cannot assess)  4. Facial Palsy: 0-->Normal symmetrical movements  5a. Motor Arm, Left: 0-->No drift: limb holds 90 (or 45) degrees for full 10 secs  5b. Motor Arm, Right: 0-->No drift: limb holds 90 (or 45) degrees for full 10 secs  6a. Motor Leg, Left: 0-->No drift: leg holds 30 degree position for full 5 secs  6b. Motor Leg, Right: 0-->No drift: leg holds 30 degree position for full 5 secs  7. Limb Ataxia: 0-->Absent (cannot assess)  8. Sensory: 0-->Normal: no sensory loss  9. Best Language: 3-->Mute, global aphasia: no usable speech or auditory comprehension  10. Dysarthria: (UN) Intubated or other physical barrier (limited by aphasia)  11. Extinction and Inattention (formerly Neglect): 0-->No abnormality (unable to assess)    Total (NIH Stroke Scale): 7      Exam limited due to pt's inability to cooperate.     PROGRESS AND CONSULTS  ED Course   2015  Received a call from Dr. Reddy, stroke neurologist and discussed pt's case. Dr. Reddy recommended a CTA head and neck, as well as a CTP. Per Dr. Reddy, pt is not a tPA candidate, but he may be a retrieval candidate in the case of obstruction.   2018  Ordered labs, CXR, CT cerebral perfusion, CTA neck, CTA head, CT head, and EKG for further evaluation, and consults to neurology and neurosurgery.   2045  Per the Rn, the pt has begun vomiting. Ordered zofran.    2047  Rechecked pt. Pt's wife is now at bedside. Pt is now able to speak intermittently without dysarthria, but will not consistently answer questions. Pt's wife states that she came home a 1830 and found the patient having difficulty answering questions, but states that he was able to speak. Per the pt's wife, the pt has become more agitated in the past after ativan.   2056  Per the RN, the pt's b/p is 238/134.  2057  Ordered cardene for hypertension.   2107  Per the RN, the pt is unable to urinate independently, and that the pt has 513cc in his bladder.   2113  Called Dr. Aldana and discussed the pt's case. Per Dr. Aldana the pt's CT and CTA results that show no acute abnormality.   2116  Ordered consult to pulmonology.   2122  Rechecked pt, who is resting comfortably. Discussed with the pt's wife, the pt's negative acute CT or CTA results. Plan to admit the pt to the ICU. Pt's wife understands and agrees with the plan, and all questions were answered.   2136  Ordered insertion of an indwelling catheter.   2138  Received a call from Dr. García and discussed pt's case. Dr. García agreed to admit the pt to the ICU.  2145  Received a call from Dr. Reddy and discussed pt's case. Dr. Reddy said to control the pt's b/p in 140-180 systolic and give the pt a full ASA.  2146  Dr. García is here to see the pt.   MEDICAL DECISION MAKING  Results were reviewed/discussed with the patient and they were also made aware of online access. Pt also made aware that some labs, such as cultures, will not be resulted during ER visit and follow up with PMD is necessary.     MDM  Number of Diagnoses or Management Options  Encephalopathy acute:   Hypertensive urgency:      Amount and/or Complexity of Data Reviewed  Clinical lab tests: reviewed and ordered (Glu 488, Creatinine 1.38, Troponin 0.023, WBC 6.61)  Tests in the radiology section of CPT®: reviewed and ordered (CXR: pulmonary congestion. CTA head: negative acute. CTA neck: negative  acute. CTP: negative acute)  Tests in the medicine section of CPT®: reviewed and ordered (See procedures section for EKG. )  Decide to obtain previous medical records or to obtain history from someone other than the patient: yes (Pt's records in EPIC)  Review and summarize past medical records: yes (Pt was admitted in 11/17 for encephalopathy, hypertensive emergency, and CVA. )  Discuss the patient with other providers: yes (Dr. Reddy (neurologist), Dr. García (pulmonologist))    Critical Care  Total time providing critical care: 30-74 minutes    Patient Progress  Patient progress: other (comment) (Pt is guarded)       Pt is not a tPA candidate due to onset of 7 hours ago.   DIAGNOSIS  Final diagnoses:   Hypertensive urgency   Encephalopathy acute       DISPOSITION  ADMISSION by Dr. García    Discussed treatment plan and reason for admission with pt/family and admitting physician.  Pt/family voiced understanding of the plan for admission for further testing/treatment as needed.     Latest Documented Vital Signs:  As of 9:46 PM  BP- (!) 208/117 HR- 111 Temp- 99 °F (37.2 °C) (Oral) O2 sat- 100%    --  Documentation assistance provided by lida Littlejhon for Dr. Noriega.  Information recorded by the lida was done at my direction and has been verified and validated by me.     Conor Littlejohn  02/16/18 6575       Damian Noriega MD  02/16/18 7854

## 2018-02-17 NOTE — H&P
.     Admission Note    Patient Identification:  Carlito Mo  62 y.o.  male  1955  [id number]            CC: acting strange    History of Present Illness:  Mr. Mo is a 62-year-old prior admission for TIA hypertensive emergency in December with severe diabetes and hypertension who is noncompliant with his medications.  He is brought into the emergency room by EMS secondary to family concerns of altered mental status.  Patient's wife provides much of the history as the patient is completely aphasic.  She does relate that the patient was alert and driving her son to different errands today.  Son reports that the patient was last normal at 4:00 upon interview with the wife.  There is some discrepancy regarding the timing of this is the wife reports that the son is autistic and that accurate history is difficult to obtain from him.  Wife return back to the house around 6:00 this evening and found him to be altered.    In the immediate emergency room his blood pressure is noted to have a systolic blood pressure over 220.  CT scan showed no acute abnormality.  Stroke team was contacted and patient was deemed not to be a TPA candidate.  He was started on Cardene drip and will be admitted to the ICU frequent neurochecks.    Undetected interview the patient will not interact with interviewer.  He will not follow any directions.  He intermittently is dry heaving and apparently is spitting at staff in the emergency room.  He is moving all extremities and his extraocular movement is intact.    Past Medical History:   Diagnosis Date   • Acute sinusitis    • Anemia    • Arthritis    • Chronic ischemic heart disease    • Depression    • Diabetes mellitus type 2, uncontrolled, without complications    • Heart attack    • Hyperlipidemia    • Hypertension    • Screening for prostate cancer 2011   • TIA (transient ischemic attack)    • Type 2 diabetes mellitus    • Vitamin D deficiency        Past Surgical History:   Procedure  Laterality Date   • APPENDECTOMY N/A 02/14/2016    Dr. Alexey Dodson   • CORONARY ARTERY BYPASS GRAFT  11/2001        Social History     Social History   • Marital status:      Spouse name: N/A   • Number of children: N/A   • Years of education: N/A     Occupational History   • Not on file.     Social History Main Topics   • Smoking status: Never Smoker   • Smokeless tobacco: Never Used   • Alcohol use No   • Drug use: No   • Sexual activity: Defer     Other Topics Concern   • Not on file     Social History Narrative       Family History   Problem Relation Age of Onset   • Diabetes Mother    • Hypertension Mother    • Macular degeneration Mother    • Alcohol abuse Father    • Cancer Father      lung   • Alcohol abuse Brother          (Not in a hospital admission)    Allergies   Allergen Reactions   • Prozac [Fluoxetine Hcl] Other (See Comments)     shaking       Review of Systems:  Unable to obtain accurate ROS as patient is unable to answer questions due to Aphasia      Physical Exam:  Vitals:  Vitals:    02/16/18 2020 02/16/18 2055 02/16/18 2115 02/16/18 2141   BP:  (!) 238/134 (!) 224/123 (!) 208/117   BP Location:  Left arm Left arm    Patient Position:  Lying Lying    Pulse:   95 111   Resp:       Temp: 99 °F (37.2 °C)      TempSrc: Oral      SpO2:   100%    Weight:       Height:               Body mass index is 25.07 kg/(m^2).  No intake or output data in the 24 hours ending 02/16/18 2200    Exam:  GENERAL:Ill appearing not oriented will not interact poorly kept chronically ill-appearing  HEENT:  EOMI, nonicteric sclera, no JVD moist mucous membranes  PULMONARY:    CTAB, no wheeze, no crackles, no rhonchi, symmetric air entry  CARDIAC:  Tachycardia regular  ABD: SNTND BS+  EXT: no c/c/e, pulses symmetric 2+ bilaterally  NEURO:  Patient is a phasic will not follow commands will not interact with provider does have movement in all extremities  PSYCH: Agitated  LYMPH: no cervical LAD    Scheduled meds:        Data Review:   I reviewed the patient's medications and new clinical results.  Lab Results   Component Value Date    CALCIUM 9.7 02/16/2018    PHOS 4.0 12/06/2017     Results from last 7 days  Lab Units 02/16/18 2020 02/16/18 2019   AST (SGOT) U/L 15  --    SODIUM mmol/L 131*  --    POTASSIUM mmol/L 4.4  --    CHLORIDE mmol/L 91*  --    CO2 mmol/L 24.6  --    BUN mg/dL 20  --    CREATININE mg/dL 1.38*  --    GLUCOSE mg/dL 488*  --    CALCIUM mg/dL 9.7  --    WBC 10*3/mm3  --  6.61   HEMOGLOBIN g/dL  --  12.3*   PLATELETS 10*3/mm3  --  262   ALT (SGPT) U/L 14  --        Results from last 7 days  Lab Units 02/16/18 2020   TROPONIN T ng/mL 0.023         Estimated Creatinine Clearance: 67.7 mL/min (by C-G formula based on Cr of 1.38).    IMAGING:  I have reviewed all imaging studies since my last documentation.  Imaging Results (most recent)     Procedure Component Value Units Date/Time    CT Angiogram Head With & Without Contrast [[id number]] Updated:  02/16/18 2046    CT Angiogram Neck With & Without Contrast [[id number]] Updated:  02/16/18 2046    CT Cerebral Perfusion With & Without Contrast [[id number]] Updated:  02/16/18 2046    XR Chest 1 View [[id number]] Collected:  02/16/18 2119     Updated:  02/16/18 2119    Narrative:       X-RAY CHEST 1 VIEW.     HISTORY: Acute stroke.     COMPARISON: 11/30/2017.     FINDINGS:  Cardiomediastinal silhouette is within normal limits.         There is no consolidation or effusion. Lung volumes are low. Pulmonary  congestion remains.          Impression:       Pulmonary congestion, no definite consolidation or effusion.                 ASSESSMENT /   PLAN:  HTN Emergency   CVA Versus metabolic encephalopathy  Noncompliance  Hyperglycemia  Hyponatremia  Acute kidney injury  Type II NSTEMI  DMII  HTN  CAD   BPH  CKD  ED  HLD  Depression    Admit to ICU for frequent neurochecks  Stroke care per neurology stroke team - they have been contacted via ED physicians and are managing  this.  SQ insulin per protocol  In AM consideration of psychiatric consult to explore depression as cause of his noncompliance with medications  cardene drip to target SBP of 180, wouldn't go lower given severity of htn unless stroke service feels differently.  HGa1c  Trend trops        Total critical care time was 55 minutes, excluding any separately billable procedure time.    Jean-Paul García MD  [facility]  02/16/18  10:00 PM

## 2018-02-17 NOTE — THERAPY EVALUATION
Acute Care - Speech Language Pathology Initial Evaluation  [facility]     Patient Name: Carlito Mo  : 1955  MRN: [id number]  Today's Date: 2018               SPEECH-LANGUAGE PATHOLOGY EVALUATION - SWALLOW  Subjective: The patient was seen on this date for a Clinical Swallow evaluation.  Patient was alert and cooperative, but slightly drowsy.  Significant history:  Admitted with altered mental status, agitation, aphasia- similar episode in dec 2017 with pt experiencing CVA at that time; Current CT- brain- no acute infarcts identified-MD notes indicate probable encephalopathy; hx- noncompliance with medications.  Objective: Textures given included ice, thin liquid, puree consistency, mechanical soft consistency and regular consistency.  Assessment: Difficulties were noted with none of the above consistencies.  SLP Findings:  Patient presents with functional swallow.  Recommendations: Diet Textures: thin liquid, regular consistency food.  Medications should be taken with thin liquids or  whole with puree   Recommended Strategies: Upright for PO and small bites and sips. Oral care before breakfast, after all meals and PRN.  FEED ONLY WHEN ALERT.    Dysphagia therapy is not recommended. Rationale: Swallow appears functional.    Admit Date: 2018     Visit Dx:    ICD-10-CM ICD-9-CM   1. Hypertensive urgency I16.0 401.9   2. Encephalopathy acute G93.40 348.30     Patient Active Problem List   Diagnosis   • Depression   • Hyperlipidemia   • Chronic ischemic heart disease   • Vitamin D deficiency   • Erectile dysfunction   • Chronic fatigue   • Diabetes mellitus   • Skin lesion of chest wall   • Slow transit constipation   • Atherosclerosis of coronary artery   • Benign prostatic hyperplasia   • Chronic kidney disease   • History of basal cell carcinoma   • Essential hypertension   • Acquired hypothyroidism   • Hypertensive urgency     Past Medical History:   Diagnosis Date   • Acute sinusitis    • Anemia     • Arthritis    • Chronic ischemic heart disease    • Depression    • Diabetes mellitus type 2, uncontrolled, without complications    • Heart attack    • Hyperlipidemia    • Hypertension    • Screening for prostate cancer 2011   • TIA (transient ischemic attack)    • Type 2 diabetes mellitus    • Vitamin D deficiency      Past Surgical History:   Procedure Laterality Date   • APPENDECTOMY N/A 02/14/2016    Dr. Alexey Dodson   • CORONARY ARTERY BYPASS GRAFT  11/2001            EDUCATION  The patient has been educated in the following areas:   Dysphagia (Swallowing Impairment).    SLP Recommendation and Plan              Anticipated Discharge Disposition: home                           IP SLP Goals       02/17/18 1535          Safely Consume Diet    Safely Consume Diet- SLP, Time to Achieve by discharge  -        User Key  (r) = Recorded By, (t) = Taken By, (c) = Cosigned By    Initials Name Provider Type    LAKEISHA George MS CCC-SLP Speech and Language Pathologist             SLP Outcome Measures (last 72 hours)      SLP Outcome Measures       02/17/18 1500          SLP Outcome Measures    Outcome Measure Used? Adult NOMS  -      FCM Scores    Swallowing FCM Score 7  -        User Key  (r) = Recorded By, (t) = Taken By, (c) = Cosigned By    Initials Name Effective Dates    LAKEISHA George MS CCC-SLP 04/13/15 -           Time Calculation:         Time Calculation- SLP       02/17/18 1535          Time Calculation- SLP    SLP Received On 02/17/18  -        User Key  (r) = Recorded By, (t) = Taken By, (c) = Cosigned By    Initials Name Provider Type    LAKEISHA George MS CCC-SLP Speech and Language Pathologist          Therapy Charges for Today     Code Description Service Date Service Provider Modifiers Qty    [id number] HC ST EVAL ORAL PHARYNG SWALLOW 4 2/17/2018 Heike George MS CCC-SLP GN 1             ADULT NOMS (last 72 hours)      Adult NOMS       02/17/18 1500                FCM Scores    Swallowing FCM  Score 7  -          User Key  (r) = Recorded By, (t) = Taken By, (c) = Cosigned By    Initials Name Effective Dates    LAKEIHSA George, MS MALONE-SLP 04/13/15 -                Heike George, MS MALONE-HA  2/17/2018

## 2018-02-17 NOTE — PLAN OF CARE
Problem: Inpatient SLP  Goal: Dysphagia- Patient will safely consume diet as per recommendation with no signs/symptoms of aspiration   02/17/18 1535   Safely Consume Diet   Safely Consume Diet- SLP, Time to Achieve by discharge

## 2018-02-17 NOTE — PROGRESS NOTES
Dr. MEGHAN Amaya    [facility] [address]    2/17/2018    Patient ID:  Name:  Carlito Mo  MRN:  [id number]  1955  62 y.o.  male            CC/Reason for visit: Follow-up for altered mental status    HPI: Patient is now completely oriented, awake, alert without any specific complaints.  He said that when he arrived he was acting strange, and he knew he was confused but he did not understand why he was confused.  He says he was told he had a stroke.  He does have a history of noncompliance with medications.    Vitals:  Vitals:    02/17/18 0753 02/17/18 0805 02/17/18 0900 02/17/18 1158   BP:  156/89 140/81 141/78   BP Location:  Left arm Left arm Right arm   Patient Position:  Lying Lying Lying   Pulse:  95 94 96   Resp:  16 16 16   Temp: 97.8 °F (36.6 °C)   98.3 °F (36.8 °C)   TempSrc: Oral   Oral   SpO2:  100% 99% 100%   Weight:       Height:               Body mass index is 26.85 kg/(m^2).    Intake/Output Summary (Last 24 hours) at 02/17/18 1824  Last data filed at 02/17/18 0446   Gross per 24 hour   Intake              347 ml   Output             1125 ml   Net             -778 ml       Exam:  GEN:  No distress, appears stated age  EYES:   EOM-i, anicteric sclera bilat  ENT:    External ears/nose normal, OP clear  NECK:  Supple, midline trachea  LUNGS: Clear breath sounds bilat, nonlabored breathing  CV:  Normal S1S2, without murmur, no edema  ABD:  Non tender, no enlarged liver or masses  EXT:  No cyanosis or clubbing    Scheduled meds:    amLODIPine 5 mg Oral Q24H   [START ON 2/18/2018] aspirin 81 mg Oral Daily   atorvastatin 80 mg Oral Nightly   clopidogrel 75 mg Oral Daily   insulin aspart 0-14 Units Subcutaneous 4x Daily With Meals & Nightly   insulin detemir 20 Units Subcutaneous Nightly   vitamin B-12 1,000 mcg Oral Daily     IV meds:                          Data Review:   I reviewed the patient's medications and new clinical results.      Results from last 7 days  Lab Units 02/17/18  0224  02/17/18  0149 02/16/18 2020 02/16/18 2019   SODIUM mmol/L 136 136 131*  --    POTASSIUM mmol/L 3.7 3.8 4.4  --    CHLORIDE mmol/L 96* 96* 91*  --    CO2 mmol/L 29.3* 25.2 24.6  --    BUN mg/dL 21 20 20  --    CREATININE mg/dL 1.54* 1.27 1.38*  --    CALCIUM mg/dL 9.0 9.1 9.7  --    BILIRUBIN mg/dL  --   --  0.2  --    ALK PHOS U/L  --   --  83  --    ALT (SGPT) U/L  --   --  14  --    AST (SGOT) U/L  --   --  15  --    GLUCOSE mg/dL 228* 451* 488*  --    WBC 10*3/mm3 13.80* 16.12*  --  6.61   HEMOGLOBIN g/dL 11.6* 11.3*  --  12.3*   PLATELETS 10*3/mm3 265 272  --  262   INR  1.02 1.04 0.98  --    PROCALCITONIN ng/mL  --   --  0.04*  --          Results from last 7 days  Lab Units 02/17/18  1614 02/17/18  0544 02/17/18  0149   TROPONIN T ng/mL 0.038* 0.034* 0.022            ASSESSMENT:   Active Problems:    Hypertensive urgency  Altered mental status  Elevated troponin  Acute kidney injury on chronic kidney disease  Uncontrolled diabetes mellitus type 2  Strip coronary artery disease      PLAN:  He is unclear why this patient was encephalopathic.  It may have been due to severe hypertensive urgency with target organ involvement.  He is now completely oriented.  His blood pressure has normalized on medications.  There is a history of noncompliance with medications.  He is now only on one antihypertensive agent, namely low-dose Norvasc and his blood pressure is already within normal parameters.  We should be careful with aggressive blood pressure lowering in this patient.  Appreciate input from neurology.  Recheck troponin.  If troponin continues to increase, we'll consult cardiology.  The patient does have a history of coronary disease and is on Plavix.        Bernardo Amaya MD  2/17/2018

## 2018-02-17 NOTE — ED NOTES
Pt to ER via EMS. Last seen normal at 1330 by family. Pt is awake but lethargic. Unable to answer questions. Pt holding his hand on his head. Nods when asked if he has a headache.     Ariela Fletcher RN  02/16/18 2005       Ariela Fletcher RN  02/16/18 2008

## 2018-02-17 NOTE — CONSULTS
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      Monocytes, Absolute 0.40 10*3/mm3      Eosinophils, Absolute 0.00 10*3/mm3      Basophils, Absolute 0.00 10*3/mm3      Immature Grans, Absolute 0.02 10*3/mm3     Basic Metabolic Panel [[id number]]  (Abnormal) Collected:  02/17/18 0544    Specimen:  Blood Updated:  02/17/18 0616     Glucose 228 (H) mg/dL      BUN 21 mg/dL      Creatinine 1.54 (H) mg/dL      Sodium 136 mmol/L      Potassium 3.7 mmol/L      Chloride 96 (L) mmol/L      CO2 29.3 (H) mmol/L      Calcium 9.0 mg/dL      eGFR Non African Amer 46 (L) mL/min/1.73      BUN/Creatinine Ratio 13.6     Anion Gap 10.7 mmol/L     Narrative:       GFR Normal >60  Chronic Kidney Disease <60  Kidney Failure <15    Protime-INR [[id number]]  (Normal) Collected:  02/17/18 0544    Specimen:  Blood Updated:  02/17/18 0616     Protime 13.2 Seconds      INR 1.02    Troponin [[id number]]  (Abnormal) Collected:  02/17/18 0544    Specimen:  Blood Updated:  02/17/18 0618     Troponin T 0.034 (H) ng/mL     Narrative:       Troponin T Reference Ranges:  Less than 0.03 ng/mL:    Negative for AMI  0.03 to 0.09 ng/mL:      Indeterminant for AMI  Greater than 0.09 ng/mL: Positive for AMI    POC Glucose Once [[id number]]  (Abnormal) Collected:  02/17/18 0706    Specimen:  Blood Updated:  02/17/18 0707     Glucose 203  (H) mg/dL     Narrative:       Meter: [id number] : [id number] Xiao GONZALEZ RN          .  Imaging Results (last 24 hours)     Procedure Component Value Units Date/Time    XR Chest 1 View [[id number]] Collected:  02/16/18 2119     Updated:  02/16/18 2119    Narrative:       X-RAY CHEST 1 VIEW.     HISTORY: Acute stroke.     COMPARISON: 11/30/2017.     FINDINGS:  Cardiomediastinal silhouette is within normal limits.         There is no consolidation or effusion. Lung volumes are low. Pulmonary  congestion remains.          Impression:       Pulmonary congestion, no definite consolidation or effusion.           CT Angiogram Head With & Without Contrast [[id number]] Collected:  02/16/18 2319     Updated:  02/16/18 2320    Narrative:       STAT CONTRAST-ENHANCED CT ANGIOGRAM OF THE HEAD AND NECK AND CT  PERFUSION STUDY OF THE HEAD 02/16/2018     CLINICAL HISTORY: Acute stroke symptoms team D.      TECHNIQUE: Initially spiral CT images were obtained from the base of the  skull to the vertex without intravenous contrast and images were  reformatted and submitted in 3 mm thick axial CT section with brain  algorithm. There is moderate small vessel disease in the cerebral white  matter, old lacunar infarct in the body of the left caudate nucleus,  posterior limb of the left internal capsule, lateral right thalamus,  left corona radiata region and superior frontoparietal subcortical white  matter but no obvious acute infarct and no intracranial hemorrhage seen  on the noncontrast head CT and the results of the study were  communicated to Dr. Reddy by telephone 02/16/2018 at 8:20 PM and he  requested a CT angiogram of the head and neck and CT perfusion study of  the head and subsequent spiral CT images were obtained through the head  during the arterial phase of contrast and images were reformatted and  analyzed with rapid software analysis for CT perfusion study of the head  and subsequent spiral CT images were obtained  from the top of the aortic  arch up through the great vessels of the head and neck during the  arterial phase of contrast and images were reformatted and submitted in  1 mm thick axial CT section, 1 mm thick sagittal and coronal  reconstructions and 3-D reconstructions were performed to complete the  CT angios of the head and neck and finally spiral CT images were  obtained from the base of the skull to the vertex delayed following  intravenous contrast and images were reformatted and submitted in 3 mm  thick axial CT section with brain algorithm.     COMPARISON: This is correlated to prior CT angiogram of the head and  neck from [facility] on 11/30/2017, prior MRI of the  head 12/02/2017.     FINDINGS: CT HEAD: There is some patchy low-density in the  periventricular extending into the subcortical white matter of the  cerebral hemispheres consistent with moderate small vessel disease.  There is a 3 mm old lacunar infarct extending from the lateral left  thalamus into the posterior limb of the left internal capsule and there  is a 8 x 3 mm old lacunar infarct involving the lateral right thalamus  extending into the posterior limb of the right internal capsule. There  is an old lacunar infarct in the posterior body of the left caudate  nucleus measuring up to 6 mm in size, small old superior left frontal  and parietal subcortical white matter infarcts. The ventricles are  normal in size. I see no mass effect and no midline shift. No  extra-axial fluid collections are identified and there is no evidence of  acute intracranial hemorrhage. No abnormal areas of enhancement are seen  in the head. There is a 1.5 cm mucus retention cyst in the posterior  inferior right maxillary sinus, otherwise the paranasal sinuses, mastoid  air cells and middle ear cavities are clear.      CT PERFUSION STUDY: The CT perfusion study is nondiagnostic     CT ANGIOGRAM OF THE HEAD AND NECK: The nasopharyngeal and  oropharyngeal  airway is collapsed limiting evaluation. The hypopharynx is  unremarkable. The true cords and subglottic airway are unremarkable. The  thyroid gland is within normal limits. The lung apices are clear. The  parotid,  parapharyngeal and submandibular spaces are  symmetric and are normal in appearance. There is disc space narrowing,  degenerative endplate changes at C5-6, posterior spurring, mild canal  and moderate right foraminal narrowing at C5-6. There is mild canal and  mild-to-moderate left foraminal narrowing at C6-7.     CT angiogram images through the neck demonstrate scattered calcified  plaques in the aortic arch. There is anatomic origin of the great  vessels off the aortic arch. The left subclavian artery origin is normal  in appearance. There is an atherosclerotic plaque mildly narrowing the  mid to distal left subclavian artery. There is atherosclerotic plaque  that appears to result in severe stenosis at the left vertebral artery  origin. There are areas of mild-to-moderate stenosis in the proximal  left vertebral artery at the C6-7 level. The remainder of the left  vertebral artery is widely patent to the vertebrobasilar junction. The  left common carotid origin is normal in appearance. No stenosis seen in  the left common carotid artery. There is mixed calcified and  noncalcified plaque involving the posterior lateral wall of the left  common carotid bifurcation extending into the origin of the left  internal carotid artery but there is essentially no stenosis in the left  internal carotid artery using the NASCET criteria. The brachycephalic  artery origin is normal in appearance and no stenosis seen in the  brachycephalic artery, its bifurcation in the right subclavian, the  common carotid artery is normal in appearance and no stenosis seen in  the right subclavian artery. There is a focal mild/moderate stenosis of  the right vertebral origin. There is multifocal up to  moderate stenosis  of the proximal right vertebral artery most pronounced at the C6-7  cervical level that appears slightly more prominent and may be on the  basis of atherosclerotic disease, less likely chronic dissection. The  upper cervical right vertebral artery is within normal limits. There is  a small size intracranial segment right vertebral artery that is patent  to the vertebrobasilar junction. The right common carotid origin is  normal in appearance and no stenosis seen in the right common carotid  artery. There is calcified plaque involving the posterior aspect of the  common carotid bifurcation extending into the medial posterior and  lateral right internal carotid origin but resulting in a mild only 20%  stenosis of the proximal right internal carotid arteries using the  NASCET criteria. The remainder of the right internal carotid artery is  normal in appearance.      CT angiogram images through the head demonstrate small diameter  intracranial segment distal right vertebral artery that is patent to the  vertebrobasilar junction. The larger diameter left vertebral artery is  also patent without stenosis to the vertebrobasilar junction. The  basilar artery and basilar tip is patent. There are hypoplastic P1  segments of the posterior cerebral arteries bilaterally with dominant  bilateral posterior communicating arteries that supply the bulk of the  blood flow to the posterior cerebral artery territories. The upper  cervical petrous segments of the internal carotid arteries are normal in  appearance. Calcified plaques mildly narrow the cavernous internal  carotids. The internal carotid termini are normal in appearance. The  anterior and middle cerebral arteries are normal in appearance. The  anterior communicating artery origin and middle cerebral artery  trifurcation and the posterior communicating artery origins are normal  in appearance.       Impression:          1. CT perfusion study is  nondiagnostic     2. There is moderate small vessel disease in cerebral white matter, tiny  3 mm old lacunar infarct posterior limb left internal capsule and 8 x 3  mm old lacunar infarct extending from the lateral right thalamus into  the posterior limb of the right internal capsule, tiny old lacunar  infarcts in posterior body of the left caudate nucleus, the left corona  radiata region and tiny old lacunar infarcts of the superior left  frontal parietal subcortical white matter. No convincing acute completed  infarct and no intracranial hemorrhage is seen on this exam     3. There is moderate-to-severe stenosis at the left vertebral origin and  mild/moderate stenosis of the proximal cervical segment of the left  vertebral artery at C6-7 cervical level. Otherwise the more dominant  left vertebral artery is widely patent to the vertebrobasilar junction  without additional stenosis. There is a moderate stenosis at the right  vertebral origin and the right vertebral artery is very diseased and  stenotic with moderate stenosis at the C6-7 level that appears slightly  more stenotic at this level than it was on a CT angiogram of the head  and neck 11/30/2017 which may be due to progressive atherosclerotic  disease, conceivably it could be due to a chronic dissection. The right  vertebral artery is patent beyond this point to the vertebrobasilar  junction. The basilar artery is patent but small in diameter and there  is an atretic P1 segment right posterior cerebral artery and markedly  hypoplastic P1 segment left posterior cerebral artery with persistent  fetal origin of the right posterior cerebral artery off the back wall,  supracavernous right internal carotid artery and near persistent fetal  origin of the posterior cerebral artery off the back wall of the  supracavernous left internal carotid artery and thus the vast majority  of the blood flow to the posterior cerebral artery territories is from  the internal  carotid arteries and not the basilar artery and this likely  accounts for the small diameter basilar artery.      4. There is mild 10 to 0% stenosis of the origin of the proximal right  internal carotid arteries on the NASCET criteria, otherwise no carotid  stenosis seen on this exam. The remainder of the CT angiogram of the  head and neck is unremarkable. If there remains clinical suspicion of  acute stroke I recommend a MRI of the brain for more complete assessment  and at the time of MRI of the brain an MRA of the neck could be obtained  with thin cut fat-suppressed T1 and T2-weighted images through the  vertebral arteries to further characterize the areas of stenosis to the  proximal cervical vertebral arteries. The results and recommendations  from this study were communicated to Dr. Reddy by telephone on  02/16/2018 at 9 PM.     Radiation dose reduction techniques were utilized, including automated  exposure control and exposure modulation based on body size.          CT Angiogram Neck With & Without Contrast [[id number]] Collected:  02/16/18 2319     Updated:  02/16/18 2320    Narrative:       STAT CONTRAST-ENHANCED CT ANGIOGRAM OF THE HEAD AND NECK AND CT  PERFUSION STUDY OF THE HEAD 02/16/2018     CLINICAL HISTORY: Acute stroke symptoms team D.      TECHNIQUE: Initially spiral CT images were obtained from the base of the  skull to the vertex without intravenous contrast and images were  reformatted and submitted in 3 mm thick axial CT section with brain  algorithm. There is moderate small vessel disease in the cerebral white  matter, old lacunar infarct in the body of the left caudate nucleus,  posterior limb of the left internal capsule, lateral right thalamus,  left corona radiata region and superior frontoparietal subcortical white  matter but no obvious acute infarct and no intracranial hemorrhage seen  on the noncontrast head CT and the results of the study were  communicated to Dr. Reddy by telephone  02/16/2018 at 8:20 PM and he  requested a CT angiogram of the head and neck and CT perfusion study of  the head and subsequent spiral CT images were obtained through the head  during the arterial phase of contrast and images were reformatted and  analyzed with rapid software analysis for CT perfusion study of the head  and subsequent spiral CT images were obtained from the top of the aortic  arch up through the great vessels of the head and neck during the  arterial phase of contrast and images were reformatted and submitted in  1 mm thick axial CT section, 1 mm thick sagittal and coronal  reconstructions and 3-D reconstructions were performed to complete the  CT angios of the head and neck and finally spiral CT images were  obtained from the base of the skull to the vertex delayed following  intravenous contrast and images were reformatted and submitted in 3 mm  thick axial CT section with brain algorithm.     COMPARISON: This is correlated to prior CT angiogram of the head and  neck from [facility] on 11/30/2017, prior MRI of the  head 12/02/2017.     FINDINGS: CT HEAD: There is some patchy low-density in the  periventricular extending into the subcortical white matter of the  cerebral hemispheres consistent with moderate small vessel disease.  There is a 3 mm old lacunar infarct extending from the lateral left  thalamus into the posterior limb of the left internal capsule and there  is a 8 x 3 mm old lacunar infarct involving the lateral right thalamus  extending into the posterior limb of the right internal capsule. There  is an old lacunar infarct in the posterior body of the left caudate  nucleus measuring up to 6 mm in size, small old superior left frontal  and parietal subcortical white matter infarcts. The ventricles are  normal in size. I see no mass effect and no midline shift. No  extra-axial fluid collections are identified and there is no evidence of  acute intracranial hemorrhage.  No abnormal areas of enhancement are seen  in the head. There is a 1.5 cm mucus retention cyst in the posterior  inferior right maxillary sinus, otherwise the paranasal sinuses, mastoid  air cells and middle ear cavities are clear.      CT PERFUSION STUDY: The CT perfusion study is nondiagnostic     CT ANGIOGRAM OF THE HEAD AND NECK: The nasopharyngeal and oropharyngeal  airway is collapsed limiting evaluation. The hypopharynx is  unremarkable. The true cords and subglottic airway are unremarkable. The  thyroid gland is within normal limits. The lung apices are clear. The  parotid,  parapharyngeal and submandibular spaces are  symmetric and are normal in appearance. There is disc space narrowing,  degenerative endplate changes at C5-6, posterior spurring, mild canal  and moderate right foraminal narrowing at C5-6. There is mild canal and  mild-to-moderate left foraminal narrowing at C6-7.     CT angiogram images through the neck demonstrate scattered calcified  plaques in the aortic arch. There is anatomic origin of the great  vessels off the aortic arch. The left subclavian artery origin is normal  in appearance. There is an atherosclerotic plaque mildly narrowing the  mid to distal left subclavian artery. There is atherosclerotic plaque  that appears to result in severe stenosis at the left vertebral artery  origin. There are areas of mild-to-moderate stenosis in the proximal  left vertebral artery at the C6-7 level. The remainder of the left  vertebral artery is widely patent to the vertebrobasilar junction. The  left common carotid origin is normal in appearance. No stenosis seen in  the left common carotid artery. There is mixed calcified and  noncalcified plaque involving the posterior lateral wall of the left  common carotid bifurcation extending into the origin of the left  internal carotid artery but there is essentially no stenosis in the left  internal carotid artery using the NASCET criteria.  The brachycephalic  artery origin is normal in appearance and no stenosis seen in the  brachycephalic artery, its bifurcation in the right subclavian, the  common carotid artery is normal in appearance and no stenosis seen in  the right subclavian artery. There is a focal mild/moderate stenosis of  the right vertebral origin. There is multifocal up to moderate stenosis  of the proximal right vertebral artery most pronounced at the C6-7  cervical level that appears slightly more prominent and may be on the  basis of atherosclerotic disease, less likely chronic dissection. The  upper cervical right vertebral artery is within normal limits. There is  a small size intracranial segment right vertebral artery that is patent  to the vertebrobasilar junction. The right common carotid origin is  normal in appearance and no stenosis seen in the right common carotid  artery. There is calcified plaque involving the posterior aspect of the  common carotid bifurcation extending into the medial posterior and  lateral right internal carotid origin but resulting in a mild only 20%  stenosis of the proximal right internal carotid arteries using the  NASCET criteria. The remainder of the right internal carotid artery is  normal in appearance.      CT angiogram images through the head demonstrate small diameter  intracranial segment distal right vertebral artery that is patent to the  vertebrobasilar junction. The larger diameter left vertebral artery is  also patent without stenosis to the vertebrobasilar junction. The  basilar artery and basilar tip is patent. There are hypoplastic P1  segments of the posterior cerebral arteries bilaterally with dominant  bilateral posterior communicating arteries that supply the bulk of the  blood flow to the posterior cerebral artery territories. The upper  cervical petrous segments of the internal carotid arteries are normal in  appearance. Calcified plaques mildly narrow the cavernous  internal  carotids. The internal carotid termini are normal in appearance. The  anterior and middle cerebral arteries are normal in appearance. The  anterior communicating artery origin and middle cerebral artery  trifurcation and the posterior communicating artery origins are normal  in appearance.       Impression:          1. CT perfusion study is nondiagnostic     2. There is moderate small vessel disease in cerebral white matter, tiny  3 mm old lacunar infarct posterior limb left internal capsule and 8 x 3  mm old lacunar infarct extending from the lateral right thalamus into  the posterior limb of the right internal capsule, tiny old lacunar  infarcts in posterior body of the left caudate nucleus, the left corona  radiata region and tiny old lacunar infarcts of the superior left  frontal parietal subcortical white matter. No convincing acute completed  infarct and no intracranial hemorrhage is seen on this exam     3. There is moderate-to-severe stenosis at the left vertebral origin and  mild/moderate stenosis of the proximal cervical segment of the left  vertebral artery at C6-7 cervical level. Otherwise the more dominant  left vertebral artery is widely patent to the vertebrobasilar junction  without additional stenosis. There is a moderate stenosis at the right  vertebral origin and the right vertebral artery is very diseased and  stenotic with moderate stenosis at the C6-7 level that appears slightly  more stenotic at this level than it was on a CT angiogram of the head  and neck 11/30/2017 which may be due to progressive atherosclerotic  disease, conceivably it could be due to a chronic dissection. The right  vertebral artery is patent beyond this point to the vertebrobasilar  junction. The basilar artery is patent but small in diameter and there  is an atretic P1 segment right posterior cerebral artery and markedly  hypoplastic P1 segment left posterior cerebral artery with persistent  fetal origin of  the right posterior cerebral artery off the back wall,  supracavernous right internal carotid artery and near persistent fetal  origin of the posterior cerebral artery off the back wall of the  supracavernous left internal carotid artery and thus the vast majority  of the blood flow to the posterior cerebral artery territories is from  the internal carotid arteries and not the basilar artery and this likely  accounts for the small diameter basilar artery.      4. There is mild 10 to 0% stenosis of the origin of the proximal right  internal carotid arteries on the NASCET criteria, otherwise no carotid  stenosis seen on this exam. The remainder of the CT angiogram of the  head and neck is unremarkable. If there remains clinical suspicion of  acute stroke I recommend a MRI of the brain for more complete assessment  and at the time of MRI of the brain an MRA of the neck could be obtained  with thin cut fat-suppressed T1 and T2-weighted images through the  vertebral arteries to further characterize the areas of stenosis to the  proximal cervical vertebral arteries. The results and recommendations  from this study were communicated to Dr. Reddy by telephone on  02/16/2018 at 9 PM.     Radiation dose reduction techniques were utilized, including automated  exposure control and exposure modulation based on body size.          CT Cerebral Perfusion With & Without Contrast [[id number]] Collected:  02/16/18 2319     Updated:  02/16/18 2320    Narrative:       STAT CONTRAST-ENHANCED CT ANGIOGRAM OF THE HEAD AND NECK AND CT  PERFUSION STUDY OF THE HEAD 02/16/2018     CLINICAL HISTORY: Acute stroke symptoms team D.      TECHNIQUE: Initially spiral CT images were obtained from the base of the  skull to the vertex without intravenous contrast and images were  reformatted and submitted in 3 mm thick axial CT section with brain  algorithm. There is moderate small vessel disease in the cerebral white  matter, old lacunar infarct in the  body of the left caudate nucleus,  posterior limb of the left internal capsule, lateral right thalamus,  left corona radiata region and superior frontoparietal subcortical white  matter but no obvious acute infarct and no intracranial hemorrhage seen  on the noncontrast head CT and the results of the study were  communicated to Dr. Reddy by telephone 02/16/2018 at 8:20 PM and he  requested a CT angiogram of the head and neck and CT perfusion study of  the head and subsequent spiral CT images were obtained through the head  during the arterial phase of contrast and images were reformatted and  analyzed with rapid software analysis for CT perfusion study of the head  and subsequent spiral CT images were obtained from the top of the aortic  arch up through the great vessels of the head and neck during the  arterial phase of contrast and images were reformatted and submitted in  1 mm thick axial CT section, 1 mm thick sagittal and coronal  reconstructions and 3-D reconstructions were performed to complete the  CT angios of the head and neck and finally spiral CT images were  obtained from the base of the skull to the vertex delayed following  intravenous contrast and images were reformatted and submitted in 3 mm  thick axial CT section with brain algorithm.     COMPARISON: This is correlated to prior CT angiogram [facility] the head and  neck from [facility] on 11/30/2017, prior MRI of the  head 12/02/2017.     FINDINGS: CT HEAD: There is some patchy low-density in the  periventricular extending into the subcortical white matter of the  cerebral hemispheres consistent with moderate small vessel disease.  There is a 3 mm old lacunar infarct extending from the lateral left  thalamus into the posterior limb of the left internal capsule and there  is a 8 x 3 mm old lacunar infarct involving the lateral right thalamus  extending into the posterior limb of the right internal capsule. There  is an old lacunar  infarct in the posterior body of the left caudate  nucleus measuring up to 6 mm in size, small old superior left frontal  and parietal subcortical white matter infarcts. The ventricles are  normal in size. I see no mass effect and no midline shift. No  extra-axial fluid collections are identified and there is no evidence of  acute intracranial hemorrhage. No abnormal areas of enhancement are seen  in the head. There is a 1.5 cm mucus retention cyst in the posterior  inferior right maxillary sinus, otherwise the paranasal sinuses, mastoid  air cells and middle ear cavities are clear.      CT PERFUSION STUDY: The CT perfusion study is nondiagnostic     CT ANGIOGRAM OF THE HEAD AND NECK: The nasopharyngeal and oropharyngeal  airway is collapsed limiting evaluation. The hypopharynx is  unremarkable. The true cords and subglottic airway are unremarkable. The  thyroid gland is within normal limits. The lung apices are clear. The  parotid,  parapharyngeal and submandibular spaces are  symmetric and are normal in appearance. There is disc space narrowing,  degenerative endplate changes at C5-6, posterior spurring, mild canal  and moderate right foraminal narrowing at C5-6. There is mild canal and  mild-to-moderate left foraminal narrowing at C6-7.     CT angiogram images through the neck demonstrate scattered calcified  plaques in the aortic arch. There is anatomic origin of the great  vessels off the aortic arch. The left subclavian artery origin is normal  in appearance. There is an atherosclerotic plaque mildly narrowing the  mid to distal left subclavian artery. There is atherosclerotic plaque  that appears to result in severe stenosis at the left vertebral artery  origin. There are areas of mild-to-moderate stenosis in the proximal  left vertebral artery at the C6-7 level. The remainder of the left  vertebral artery is widely patent to the vertebrobasilar junction. The  left common carotid origin is normal in  appearance. No stenosis seen in  the left common carotid artery. There is mixed calcified and  noncalcified plaque involving the posterior lateral wall of the left  common carotid bifurcation extending into the origin of the left  internal carotid artery but there is essentially no stenosis in the left  internal carotid artery using the NASCET criteria. The brachycephalic  artery origin is normal in appearance and no stenosis seen in the  brachycephalic artery, its bifurcation in the right subclavian, the  common carotid artery is normal in appearance and no stenosis seen in  the right subclavian artery. There is a focal mild/moderate stenosis of  the right vertebral origin. There is multifocal up to moderate stenosis  of the proximal right vertebral artery most pronounced at the C6-7  cervical level that appears slightly more prominent and may be on the  basis of atherosclerotic disease, less likely chronic dissection. The  upper cervical right vertebral artery is within normal limits. There is  a small size intracranial segment right vertebral artery that is patent  to the vertebrobasilar junction. The right common carotid origin is  normal in appearance and no stenosis seen in the right common carotid  artery. There is calcified plaque involving the posterior aspect of the  common carotid bifurcation extending into the medial posterior and  lateral right internal carotid origin but resulting in a mild only 20%  stenosis of the proximal right internal carotid arteries using the  NASCET criteria. The remainder of the right internal carotid artery is  normal in appearance.      CT angiogram images through the head demonstrate small diameter  intracranial segment distal right vertebral artery that is patent to the  vertebrobasilar junction. The larger diameter left vertebral artery is  also patent without stenosis to the vertebrobasilar junction. The  basilar artery and basilar tip is patent. There are hypoplastic  P1  segments of the posterior cerebral arteries bilaterally with dominant  bilateral posterior communicating arteries that supply the bulk of the  blood flow to the posterior cerebral artery territories. The upper  cervical petrous segments of the internal carotid arteries are normal in  appearance. Calcified plaques mildly narrow the cavernous internal  carotids. The internal carotid termini are normal in appearance. The  anterior and middle cerebral arteries are normal in appearance. The  anterior communicating artery origin and middle cerebral artery  trifurcation and the posterior communicating artery origins are normal  in appearance.       Impression:          1. CT perfusion study is nondiagnostic     2. There is moderate small vessel disease in cerebral white matter, tiny  3 mm old lacunar infarct posterior limb left internal capsule and 8 x 3  mm old lacunar infarct extending from the lateral right thalamus into  the posterior limb of the right internal capsule, tiny old lacunar  infarcts in posterior body of the left caudate nucleus, the left corona  radiata region and tiny old lacunar infarcts of the superior left  frontal parietal subcortical white matter. No convincing acute completed  infarct and no intracranial hemorrhage is seen on this exam     3. There is moderate-to-severe stenosis at the left vertebral origin and  mild/moderate stenosis of the proximal cervical segment of the left  vertebral artery at C6-7 cervical level. Otherwise the more dominant  left vertebral artery is widely patent to the vertebrobasilar junction  without additional stenosis. There is a moderate stenosis at the right  vertebral origin and the right vertebral artery is very diseased and  stenotic with moderate stenosis at the C6-7 level that appears slightly  more stenotic at this level than it was on a CT angiogram of the head  and neck 11/30/2017 which may be due to progressive atherosclerotic  disease, conceivably it  could be due to a chronic dissection. The right  vertebral artery is patent beyond this point to the vertebrobasilar  junction. The basilar artery is patent but small in diameter and there  is an atretic P1 segment right posterior cerebral artery and markedly  hypoplastic P1 segment left posterior cerebral artery with persistent  fetal origin of the right posterior cerebral artery off the back wall,  supracavernous right internal carotid artery and near persistent fetal  origin of the posterior cerebral artery off the back wall of the  supracavernous left internal carotid artery and thus the vast majority  of the blood flow to the posterior cerebral artery territories is from  the internal carotid arteries and not the basilar artery and this likely  accounts for the small diameter basilar artery.      4. There is mild 10 to 0% stenosis of the origin of the proximal right  internal carotid arteries on the NASCET criteria, otherwise no carotid  stenosis seen on this exam. The remainder of the CT angiogram of the  head and neck is unremarkable. If there remains clinical suspicion of  acute stroke I recommend a MRI of the brain for more complete assessment  and at the time of MRI of the brain an MRA of the neck could be obtained  with thin cut fat-suppressed T1 and T2-weighted images through the  vertebral arteries to further characterize the areas of stenosis to the  proximal cervical vertebral arteries. The results and recommendations  from this study were communicated to Dr. Reddy by telephone on  02/16/2018 at 9 PM.     Radiation dose reduction techniques were utilized, including automated  exposure control and exposure modulation based on body size.                For this problem, the following was ordered:  Orders Placed This Encounter   Procedures   • Critical Care   • Urine Culture - Urine, Urine, Clean Catch   • XR Chest 1 View   • CT Angiogram Head With & Without Contrast   • CT Angiogram Neck With & Without  Contrast   • CT Cerebral Perfusion With & Without Contrast   • [place] Draw   • Comprehensive Metabolic Panel   • Protime-INR   • aPTT   • Troponin   • CBC Auto Differential   • Basic Metabolic Panel   • Magnesium   • Protime-INR   • Cholesterol, Total   • Lipid Panel   • Triglycerides   • Troponin   • Urinalysis With / Culture If Indicated - Urine, Clean Catch   • Procalcitonin   • CBC Auto Differential   • LDL Cholesterol, Direct   • Urinalysis, Microscopic Only - Urine, Clean Catch   • CBC Auto Differential   • Hemoglobin A1c   • NPO Diet   • Initiate Department's Acute Stroke Process (Team D, Code 19, etc.)   • Measure Weight   • Head of bed 30 degrees or less   • Vital signs   • Neuro checks   • No Hypotonic Fluids   • Nursing swallow assessment   • Insert Indwelling Urinary Catheter   • Assess Need for Indwelling Urinary Catheter - Follow Removal Protocol   • Catheter Care   • Vital Signs Every Hour and Per Hospital Policy Based on Patient Condition   • Cardiac Monitoring   • Continuous Pulse Oximetry   • Strict Bed Rest   • Use Mobility Guidelines for Advancement of Activity   • Height & Weight   • Daily Weights   • Intake and Output   • Saline Lock & Maintain IV Access   • Place Sequential Compression Device   • Maintain Sequential Compression Device   • Do NOT Hold Basal Insulin When Patient is NPO, Hold Bolus Dose if NPO   • Follow [facility] Hypoglycemia Protocol For Blood Glucose Less Than 70 mg/dL   • Hypoglycemia Treatment - Alert Patient That is Not NPO and Can Safely Swallow   • Hypoglycemia Treatment - Patient Has IV Access - Unresponsive, NPO or Unable To Safely Swallow   • Hypoglycemia Treatment - Patient Without IV Access - Unresponsive, NPO or Unable To Safely Swallow   • Notify Provider of event. Communicate needs and document in EMR.   • Document event and patients response to treatment in EMR, any medications given to be documented on MAR.   • Full Code   • Consult Interventional Neurologist and/or  Stroke Team   • Inpatient Consult to Neurology (on call physician/group)   • Pulmonology (on-call MD unless specified)   • Inpatient Consult to Neurology   • Inpatient Consult to Case Management    • PT Consult: Eval & Treat   • Oxygen Therapy- Nasal Cannula; 2 LPM; Titrate for SPO2: equal to or greater than, 94%   • SLP Consult: Eval & Treat   • SLP Evaluation - Failed Bedside Dysphagia Screening   • POC Glucose Once   • POC Glucose Once   • POC Glucose Q6H   • POC Glucose Once   • POC Glucose Once   • POC Glucose Once   • POC Glucose Once   • ECG 12 Lead   • Type & Screen   • Insert large-bore peripheral IV - Right AC preferred   • Insert Peripheral IV   • Inpatient Admission   • CBC & Differential   • Light Blue Top   • Green Top (Gel)   • Lavender Top   • Gold Top - SST   • CBC & Differential       Problem List Items Addressed This Visit     Hypertensive urgency - Primary      Other Visit Diagnoses     Encephalopathy acute              ASSESSMENT/PLAN: This is a 62 y.o. male who has   Past Medical History:   Diagnosis Date   • Acute sinusitis    • Anemia    • Arthritis    • Chronic ischemic heart disease    • Depression    • Diabetes mellitus type 2, uncontrolled, without complications    • Heart attack    • Hyperlipidemia    • Hypertension    • Screening for prostate cancer 2011   • TIA (transient ischemic attack)    • Type 2 diabetes mellitus    • Vitamin D deficiency     presents with Altered mental status, found to have elevated blood sugars and blood pressure.  Currently on Cardene drip.  Patient had similar presentation in December and was found to have embolic strokes.    1.  Acute encephalopathy (Improved significantly) secondary to acute embolic CVA in combination with uncontrolled hypertension and diabetes: Patient is extremely noncompliant with his medications.  Patient had embolic CVA recently in December 2017.  At that time he had NEISHA.    - Stroke labs; B12, TSH, lipid and A1c.  B12  388 A1c 11.6 TSH 5.4 LDL 89 back in December  - MRI brain: Multiple embolic stroke in different vascular distribution  - Telemetry   - PTOT speech rehabilitation assessment  - DVT prophylaxis  - Swallow study  - Echocardiogram ejection fraction 45% in December.  - Antiplatelet therapy and statins.  - ZIO patch as outpatient at discharge to rule out atrial fibrillation, would like to anticoagulate patient however we do not have a definitive evidence of embolism from atrial fibrillation; also other problem is patient's noncompliance.   - B12 replacement  - Urine drug screen Negative  - Anticoagulation will be considered in the future if there is evidence of atrial fibrillation or other type of hypercoagulable state or if he fails antiplatelet therapy in spite of good compliance.  - At discharge Secondary stroke prophylaxis: Ideal targets for stroke prevention would be Blood pressure < 130/80; B12 > 500 TSH in normal range and LDL < 70; HbA1c < 6.5 and smoking cessation if applicable.        Thank you for allowing us to participate in the care of this patient.    Noelle Reddy MD  02/17/18  8:50 AM

## 2018-02-17 NOTE — SIGNIFICANT NOTE
02/17/18 1356   Rehab Treatment   Discipline occupational therapist   Rehab Evaluation   Evaluation Not Performed patient unavailable for evaluation  (Spoke with RN, pt off floor for ECHO. Will f/u next OT service date.)   Recommendation   OT - Next Appointment 02/19/18

## 2018-02-17 NOTE — PLAN OF CARE
Problem: Fall Risk (Adult)  Goal: Identify Related Risk Factors and Signs and Symptoms  Outcome: Outcome(s) achieved Date Met: 02/17/18    Goal: Absence of Falls  Outcome: Ongoing (interventions implemented as appropriate)      Problem: Infection, Risk/Actual (Adult)  Goal: Identify Related Risk Factors and Signs and Symptoms  Outcome: Outcome(s) achieved Date Met: 02/17/18    Goal: Infection Prevention/Resolution  Outcome: Ongoing (interventions implemented as appropriate)      Problem: Stroke (Ischemic) (Adult)  Goal: Signs and Symptoms of Listed Potential Problems Will be Absent or Manageable (Stroke)  Outcome: Ongoing (interventions implemented as appropriate)      Problem: Confusion, Acute (Adult)  Goal: Identify Related Risk Factors and Signs and Symptoms  Outcome: Outcome(s) achieved Date Met: 02/17/18    Goal: Cognitive/Functional Impairments Minimized  Outcome: Ongoing (interventions implemented as appropriate)    Goal: Safety  Outcome: Ongoing (interventions implemented as appropriate)      Problem: Hypertensive Disease/Crisis (Arterial) (Adult)  Goal: Signs and Symptoms of Listed Potential Problems Will be Absent or Manageable (Hypertensive Disease/Crisis)  Outcome: Ongoing (interventions implemented as appropriate)      Problem: Patient Care Overview (Adult)  Goal: Plan of Care Review  Outcome: Ongoing (interventions implemented as appropriate)   02/17/18 0318   Coping/Psychosocial Response Interventions   Plan Of Care Reviewed With patient   Patient Care Overview   Progress progress toward functional goals as expected   Outcome Evaluation   Outcome Summary/Follow up Plan meds per order, prn meds per request, pt A+O, anxious at times, cardene gtt cont, see labs, see nih, fc in place, no falls, see vs     Goal: Adult Individualization and Mutuality  Outcome: Ongoing (interventions implemented as appropriate)    Goal: Discharge Needs Assessment  Outcome: Ongoing (interventions implemented as  appropriate)

## 2018-02-17 NOTE — PLAN OF CARE
Problem: Patient Care Overview (Adult)  Goal: Plan of Care Review  Outcome: Ongoing (interventions implemented as appropriate)   02/17/18 0318 02/17/18 1207 02/17/18 1343   Coping/Psychosocial Response Interventions   Plan Of Care Reviewed With --  patient --    Patient Care Overview   Progress progress toward functional goals as expected --  --    Outcome Evaluation   Outcome Summary/Follow up Plan --  --  Pt admitted from ED. MRI showed infarct. Swallow failed in ED, awaiting SLP to see. Pt lethargic but aawakened by voice. Has some mild aphasia. Chou for acute retention. WBC elevated. Will need Zio at d/c. VSS. Will continue to monitor.     Goal: Adult Individualization and Mutuality  Outcome: Ongoing (interventions implemented as appropriate)    Goal: Discharge Needs Assessment  Outcome: Ongoing (interventions implemented as appropriate)

## 2018-02-18 LAB
ANION GAP SERPL CALCULATED.3IONS-SCNC: 10.8 MMOL/L
BACTERIA SPEC AEROBE CULT: NO GROWTH
BASOPHILS # BLD AUTO: 0.01 10*3/MM3 (ref 0–0.2)
BASOPHILS NFR BLD AUTO: 0.1 % (ref 0–1.5)
BUN BLD-MCNC: 21 MG/DL (ref 8–23)
BUN/CREAT SERPL: 14.6 (ref 7–25)
CALCIUM SPEC-SCNC: 8.4 MG/DL (ref 8.6–10.5)
CHLORIDE SERPL-SCNC: 101 MMOL/L (ref 98–107)
CHOLEST SERPL-MCNC: 257 MG/DL (ref 0–200)
CO2 SERPL-SCNC: 28.2 MMOL/L (ref 22–29)
CREAT BLD-MCNC: 1.44 MG/DL (ref 0.76–1.27)
DEPRECATED RDW RBC AUTO: 42.8 FL (ref 37–54)
EOSINOPHIL # BLD AUTO: 0.08 10*3/MM3 (ref 0–0.7)
EOSINOPHIL NFR BLD AUTO: 0.8 % (ref 0.3–6.2)
ERYTHROCYTE [DISTWIDTH] IN BLOOD BY AUTOMATED COUNT: 14.3 % (ref 11.5–14.5)
GFR SERPL CREATININE-BSD FRML MDRD: 50 ML/MIN/1.73
GLUCOSE BLD-MCNC: 122 MG/DL (ref 65–99)
GLUCOSE BLDC GLUCOMTR-MCNC: 124 MG/DL (ref 70–130)
GLUCOSE BLDC GLUCOMTR-MCNC: 187 MG/DL (ref 70–130)
GLUCOSE BLDC GLUCOMTR-MCNC: 252 MG/DL (ref 70–130)
GLUCOSE BLDC GLUCOMTR-MCNC: 265 MG/DL (ref 70–130)
HCT VFR BLD AUTO: 34.2 % (ref 40.4–52.2)
HDLC SERPL-MCNC: 50 MG/DL (ref 40–60)
HGB BLD-MCNC: 10.7 G/DL (ref 13.7–17.6)
IMM GRANULOCYTES # BLD: 0.03 10*3/MM3 (ref 0–0.03)
IMM GRANULOCYTES NFR BLD: 0.3 % (ref 0–0.5)
INR PPP: 1.09 (ref 0.9–1.1)
LDLC SERPL CALC-MCNC: 190 MG/DL (ref 0–100)
LDLC/HDLC SERPL: 3.8 {RATIO}
LYMPHOCYTES # BLD AUTO: 2.06 10*3/MM3 (ref 0.9–4.8)
LYMPHOCYTES NFR BLD AUTO: 21.8 % (ref 19.6–45.3)
MAGNESIUM SERPL-MCNC: 2.2 MG/DL (ref 1.6–2.4)
MCH RBC QN AUTO: 25.6 PG (ref 27–32.7)
MCHC RBC AUTO-ENTMCNC: 31.3 G/DL (ref 32.6–36.4)
MCV RBC AUTO: 81.8 FL (ref 79.8–96.2)
MONOCYTES # BLD AUTO: 0.63 10*3/MM3 (ref 0.2–1.2)
MONOCYTES NFR BLD AUTO: 6.7 % (ref 5–12)
NEUTROPHILS # BLD AUTO: 6.62 10*3/MM3 (ref 1.9–8.1)
NEUTROPHILS NFR BLD AUTO: 70.3 % (ref 42.7–76)
PHOSPHATE SERPL-MCNC: 4.9 MG/DL (ref 2.5–4.5)
PLATELET # BLD AUTO: 223 10*3/MM3 (ref 140–500)
PMV BLD AUTO: 10.8 FL (ref 6–12)
POTASSIUM BLD-SCNC: 3.9 MMOL/L (ref 3.5–5.2)
PROTHROMBIN TIME: 13.9 SECONDS (ref 11.7–14.2)
RBC # BLD AUTO: 4.18 10*6/MM3 (ref 4.6–6)
SODIUM BLD-SCNC: 140 MMOL/L (ref 136–145)
TRIGL SERPL-MCNC: 84 MG/DL (ref 0–150)
VLDLC SERPL-MCNC: 16.8 MG/DL (ref 5–40)
WBC NRBC COR # BLD: 9.43 10*3/MM3 (ref 4.5–10.7)

## 2018-02-18 PROCEDURE — 85610 PROTHROMBIN TIME: CPT | Performed by: INTERNAL MEDICINE

## 2018-02-18 PROCEDURE — 63710000001 INSULIN ASPART PER 5 UNITS: Performed by: INTERNAL MEDICINE

## 2018-02-18 PROCEDURE — 36415 COLL VENOUS BLD VENIPUNCTURE: CPT | Performed by: INTERNAL MEDICINE

## 2018-02-18 PROCEDURE — 82962 GLUCOSE BLOOD TEST: CPT

## 2018-02-18 PROCEDURE — 99221 1ST HOSP IP/OBS SF/LOW 40: CPT | Performed by: INTERNAL MEDICINE

## 2018-02-18 PROCEDURE — 97110 THERAPEUTIC EXERCISES: CPT | Performed by: PHYSICAL THERAPIST

## 2018-02-18 PROCEDURE — 84100 ASSAY OF PHOSPHORUS: CPT | Performed by: INTERNAL MEDICINE

## 2018-02-18 PROCEDURE — 85025 COMPLETE CBC W/AUTO DIFF WBC: CPT | Performed by: INTERNAL MEDICINE

## 2018-02-18 PROCEDURE — 80048 BASIC METABOLIC PNL TOTAL CA: CPT | Performed by: INTERNAL MEDICINE

## 2018-02-18 PROCEDURE — 97161 PT EVAL LOW COMPLEX 20 MIN: CPT | Performed by: PHYSICAL THERAPIST

## 2018-02-18 PROCEDURE — 80061 LIPID PANEL: CPT | Performed by: PSYCHIATRY & NEUROLOGY

## 2018-02-18 PROCEDURE — 83735 ASSAY OF MAGNESIUM: CPT | Performed by: INTERNAL MEDICINE

## 2018-02-18 RX ADMIN — INSULIN ASPART 3 UNITS: 100 INJECTION, SOLUTION INTRAVENOUS; SUBCUTANEOUS at 18:22

## 2018-02-18 RX ADMIN — INSULIN DETEMIR 20 UNITS: 100 INJECTION, SOLUTION SUBCUTANEOUS at 21:11

## 2018-02-18 RX ADMIN — CYANOCOBALAMIN TAB 500 MCG 1000 MCG: 500 TAB at 08:46

## 2018-02-18 RX ADMIN — INSULIN ASPART 8 UNITS: 100 INJECTION, SOLUTION INTRAVENOUS; SUBCUTANEOUS at 21:13

## 2018-02-18 RX ADMIN — ASPIRIN 81 MG: 81 TABLET ORAL at 08:46

## 2018-02-18 RX ADMIN — AMLODIPINE BESYLATE 5 MG: 5 TABLET ORAL at 08:46

## 2018-02-18 RX ADMIN — CLOPIDOGREL 75 MG: 75 TABLET, FILM COATED ORAL at 08:46

## 2018-02-18 RX ADMIN — ATORVASTATIN CALCIUM 80 MG: 80 TABLET, FILM COATED ORAL at 21:11

## 2018-02-18 RX ADMIN — INSULIN ASPART 8 UNITS: 100 INJECTION, SOLUTION INTRAVENOUS; SUBCUTANEOUS at 12:53

## 2018-02-18 NOTE — CONSULTS
Patient Name: Carlito Mo  :1955  62 y.o.    Date of Admission: 2018  Date of Consultation:  18  Encounter Provider: Heike Alvarez RN  Place of Service: [facility] CARDIOLOGY  Referring Provider: Jean-Paul García MD  Patient Care Team:  García Rehman MD as PCP - General (Family Medicine)  García Rehman MD as PCP - Family Medicine      Chief complaint: AMS    Consulted for: CVA-cardiac cause?, elevated troponin    History of Present Illness: Mr. Mo is a   62-year-old gentleman with embolic stroke and was worked up in 2017.  NEISHA showed ejection fraction 45% moderate dilated left atrial cavity mildly decreased reduced flow in left atrial appendage.  He did have a small PFO.  He now presents with a hypertensive crisis and a new stroke.  He wore a Holter monitor on prior hospitalization was unremarkable.  We are asked to see for further evaluation        Cardiac Testing:  Echo 18  · Calculated EF = 62.9%  · Left ventricular systolic function is normal.  · Left ventricular diastolic dysfunction (grade I) consistent with impaired relaxation.  · Mild mitral valve regurgitation is present  · Saline test results are negative  Holter Monitor   Study Findings  No symptoms reported during the monitoring period. No complications noted. The predominant rhythm noted during the testing period was sinus rhythm. Premature atrial contractions occured rarely. There were no episodes of supraventricular tachycardia. Premature ventricular contractions occured rarely. There were no episodes of ventricular tachycardia. Sinoatrial node conduction was normal. No atrioventricular block noted.    NEISHA 2017  · Left ventricular systolic function is mildly decreased. Calculated EF = 45%. Normal left ventricular cavity size and wall thickness noted.  · The following segments are akinetic: mid anteroseptal, mid inferoseptal, apical anterior, apical septal and apex.  · Left  atrial cavity size is moderately dilated.  · Doppler interrogation shows mildly reduced flow within the left atrial appendage.  · Small patent foramen ovale present.  · The mitral valve is abnormal in structure. There is moderate bileaflet thickening present.  · Mild mitral valve regurgitation is present.      Past Medical History:   Diagnosis Date   • Acute sinusitis    • Anemia    • Arthritis    • Chronic ischemic heart disease    • Depression    • Diabetes mellitus type 2, uncontrolled, without complications    • Heart attack    • Hyperlipidemia    • Hypertension    • Screening for prostate cancer 2011   • TIA (transient ischemic attack)    • Type 2 diabetes mellitus    • Vitamin D deficiency        Past Surgical History:   Procedure Laterality Date   • APPENDECTOMY N/A 02/14/2016    Dr. Alexey Dodson   • CORONARY ARTERY BYPASS GRAFT  11/2001         Prior to Admission medications    Medication Sig Start Date End Date Taking? Authorizing Provider   amLODIPine (NORVASC) 5 MG tablet Take 1 tablet by mouth Daily for 180 days. 1/18/18 7/17/18 Yes García Rehman MD   aspirin  MG EC tablet Take 1 tablet by mouth daily. 6/27/12  Yes Historical Provider, MD   atorvastatin (LIPITOR) 80 MG tablet Take 1 tablet by mouth Daily. 3/28/17 3/28/18 Yes JI Mello   buPROPion XL (WELLBUTRIN XL) 150 MG 24 hr tablet Take 1 tablet by mouth Daily for 180 days. 8/21/17 2/17/18 Yes García Rehman MD   docusate sodium (COLACE) 100 MG capsule Take 1 capsule by mouth 2 (two) times a day.  Patient taking differently: Take 100 mg by mouth Daily. 8/27/16  Yes NICOLE Mckeon   insulin aspart (novoLOG FLEXPEN) 100 UNIT/ML solution pen-injector sc pen Inject  under the skin 3 (Three) Times a Day With Meals. Pen found in pt belongs - unsure dose/frequency   Yes Historical Provider, MD   Insulin Glargine (BASAGLAR KWIKPEN) 100 UNIT/ML injection pen Inject 40 Units under the skin Daily for 90 days. 1/18/18 4/18/18 Yes García  MD Ori   Insulin Pen Needle (B-D ULTRAFINE III SHORT PEN) 31G X 8 MM misc Use to inject 4x daily 12/18/17  Yes JI Mello   levothyroxine (SYNTHROID) 50 MCG tablet Take 1 tablet by mouth Daily. 12/20/17 12/20/18 Yes JI Mello   metoprolol tartrate (LOPRESSOR) 100 MG tablet Take 1 tablet by mouth Every 12 (Twelve) Hours. 12/6/17  Yes Erich García MD   RELION GLUCOSE TEST STRIPS test strip 1 each by Other route 2 (two) times a day. Use as instructed   Yes Historical Provider, MD   SAXagliptin-MetFORMIN ER 2.5-1000 MG tablet sustained-release 24 hour Take 2 tablets by mouth Daily. Pt taking 50/100 - 2 tablets with evening meal 12/12/17  Yes JI Herman   valsartan (DIOVAN) 320 MG tablet Take 1 tablet by mouth Daily for 180 days. 1/18/18 7/17/18 Yes García Rehman MD   vitamin D (ERGOCALCIFEROL) 17968 units capsule capsule Take 1 cap 3 times weekly 12/20/17  Yes JI Mello       Allergies   Allergen Reactions   • Prozac [Fluoxetine Hcl] Other (See Comments)     shaking       Social History     Social History   • Marital status:      Spouse name: N/A   • Number of children: N/A   • Years of education: N/A     Social History Main Topics   • Smoking status: Never Smoker   • Smokeless tobacco: Never Used   • Alcohol use No   • Drug use: No   • Sexual activity: Defer     Other Topics Concern   • None     Social History Narrative       Family History   Problem Relation Age of Onset   • Diabetes Mother    • Hypertension Mother    • Macular degeneration Mother    • Alcohol abuse Father    • Cancer Father      lung   • Alcohol abuse Brother        REVIEW OF SYSTEMS:   All systems reviewed.  Pertinent positives identified in HPI.  All other systems are negative.      Objective:     Vitals:    02/17/18 1823 02/17/18 2010 02/18/18 0500 02/18/18 0846   BP: 127/65 152/82 141/87 136/71   BP Location:  Left arm Left arm    Patient Position:  Lying Lying    Pulse: 75 75 71 73    Resp: 16 16 15    Temp: 98.2 °F (36.8 °C) 98.4 °F (36.9 °C) 98 °F (36.7 °C)    TempSrc: Oral Oral Oral    SpO2: 98% 96% 97%    Weight:       Height:         Body mass index is 26.85 kg/(m^2).    General Appearance:    Alert, cooperative, in no acute distress   Head:    Normocephalic, without obvious abnormality, atraumatic   Eyes:            Lids and lashes normal, conjunctivae and sclerae normal, no   icterus, no pallor, corneas clear, PERRLA   Ears:    Ears appear intact with no abnormalities noted   Throat:   No oral lesions, no thrush, oral mucosa moist   Neck:   No adenopathy, supple, trachea midline, no thyromegaly, no   carotid bruit, no JVD   Back:     No kyphosis present, no scoliosis present, no skin lesions, erythema or scars, no tenderness to percussion or palpation, range of motion normal   Lungs:     Clear to auscultation,respirations regular, even and unlabored    Heart:    Regular rhythm and normal rate, normal S1 and S2, no murmur, no gallop, no rub, no click   Chest Wall:    No abnormalities observed   Abdomen:     Normal bowel sounds, no masses, no organomegaly, soft        non-tender, non-distended, no guarding, no rebound  tenderness   Extremities:   Moves all extremities well, no edema, no cyanosis, no redness   Pulses:   Pulses palpable and equal bilaterally. Normal radial, carotid, femoral, dorsalis pedis and posterior tibial pulses bilaterally. Normal abdominal aorta   Skin:  Psychiatric:   No bleeding, bruising or rash    Alert and oriented x 3, normal mood and affect   Lab Review:       Results from last 7 days  Lab Units 02/18/18  0503  02/16/18 2020   SODIUM mmol/L 140  < > 131*   POTASSIUM mmol/L 3.9  < > 4.4   CHLORIDE mmol/L 101  < > 91*   CO2 mmol/L 28.2  < > 24.6   BUN mg/dL 21  < > 20   CREATININE mg/dL 1.44*  < > 1.38*   CALCIUM mg/dL 8.4*  < > 9.7   BILIRUBIN mg/dL  --   --  0.2   ALK PHOS U/L  --   --  83   ALT (SGPT) U/L  --   --  14   AST (SGOT) U/L  --   --  15   GLUCOSE  mg/dL 122*  < > 488*   < > = values in this interval not displayed.    Results from last 7 days  Lab Units 02/17/18  1614 02/17/18  0544 02/17/18  0149   TROPONIN T ng/mL 0.038* 0.034* 0.022       Results from last 7 days  Lab Units 02/18/18  0503   WBC 10*3/mm3 9.43   HEMOGLOBIN g/dL 10.7*   HEMATOCRIT % 34.2*   PLATELETS 10*3/mm3 223       Results from last 7 days  Lab Units 02/18/18  0503 02/17/18  0544 02/17/18  0149 02/16/18 2020   INR  1.09 1.02 1.04 0.98   APTT seconds  --   --   --  22.8       Results from last 7 days  Lab Units 02/18/18  0503   MAGNESIUM mg/dL 2.2       Results from last 7 days  Lab Units 02/18/18  0503   CHOLESTEROL mg/dL 257*   TRIGLYCERIDES mg/dL 84   HDL CHOL mg/dL 50   LDL CHOL mg/dL 190*                  I personally viewed and interpreted the patient's EKG/Telemetry data.        Assessment and Plan:       1.  Hypertensive emergency.  Blood pressure clinically better.  2.  Minimally elevated troponin.  This could of course be from enzyme leak from the hypertensive emergency.  Also sometimes troponin can go up with neurologic events.  It is rare but does happen and is described in the literature.  Do not think troponin represents anything clinically significant.  3.  Agree with monitor place a 14 day continuous monitor upon discharge.    Heike Alvarez RN  02/18/18  9:17 AM      Alverto Templeton MD  [facility]  2/18/2018 3:52 PM

## 2018-02-18 NOTE — PROGRESS NOTES
"  Dr. MEGHAN Amaya    [facility] [address]    2/18/2018    Patient ID:  Name:  Carlito Mo  MRN:  [id number]  1955  62 y.o.  male            CC/Reason for visit: Follow-up for altered mental status    HPI: Patient has been completely coherent since yesterday.  His only and main complaint today is anal pruritus.  His wife is in the room, stating that he is back to \"normal\".  Cardiology has seen the patient for his elevated troponin and history of coronary disease.    Vitals:  Vitals:    02/17/18 2010 02/18/18 0500 02/18/18 0846 02/18/18 1323   BP: 152/82 141/87 136/71 154/90   BP Location: Left arm Left arm     Patient Position: Lying Lying     Pulse: 75 71 73 70   Resp: 16 15 18 18   Temp: 98.4 °F (36.9 °C) 98 °F (36.7 °C) 98 °F (36.7 °C) 98 °F (36.7 °C)   TempSrc: Oral Oral Oral Oral   SpO2: 96% 97% 99% 95%   Weight:       Height:               Body mass index is 26.85 kg/(m^2).    Intake/Output Summary (Last 24 hours) at 02/18/18 1711  Last data filed at 02/18/18 0600   Gross per 24 hour   Intake                0 ml   Output              570 ml   Net             -570 ml       Exam:  GEN:  No distress, appears stated age  EYES:   EOM-i, anicteric sclera bilat  ENT:    External ears/nose normal, OP clear  NECK:  Supple, midline trachea  LUNGS: Clear breath sounds bilat, nonlabored breathing  CV:  Normal S1S2, without murmur, no edema  ABD:  Non tender, no enlarged liver or masses  EXT:  No cyanosis or clubbing    Scheduled meds:    amLODIPine 5 mg Oral Q24H   aspirin 81 mg Oral Daily   atorvastatin 80 mg Oral Nightly   clopidogrel 75 mg Oral Daily   insulin aspart 0-14 Units Subcutaneous 4x Daily With Meals & Nightly   insulin detemir 20 Units Subcutaneous Nightly   vitamin B-12 1,000 mcg Oral Daily     IV meds:                           Data Review:   I reviewed the patient's medications and new clinical results.  Lab Results   Component Value Date    CALCIUM 8.4 (L) 02/18/2018    PHOS 4.9 (H) " 02/18/2018    MG 2.2 02/18/2018    MG 1.4 (C) 02/17/2018    MG 1.6 02/16/2018       Results from last 7 days  Lab Units 02/18/18  0503 02/17/18  0544 02/17/18  0149 02/16/18 2020   SODIUM mmol/L 140 136 136 131*   POTASSIUM mmol/L 3.9 3.7 3.8 4.4   CHLORIDE mmol/L 101 96* 96* 91*   CO2 mmol/L 28.2 29.3* 25.2 24.6   BUN mg/dL 21 21 20 20   CREATININE mg/dL 1.44* 1.54* 1.27 1.38*   CALCIUM mg/dL 8.4* 9.0 9.1 9.7   BILIRUBIN mg/dL  --   --   --  0.2   ALK PHOS U/L  --   --   --  83   ALT (SGPT) U/L  --   --   --  14   AST (SGOT) U/L  --   --   --  15   GLUCOSE mg/dL 122* 228* 451* 488*   WBC 10*3/mm3 9.43 13.80* 16.12*  --    HEMOGLOBIN g/dL 10.7* 11.6* 11.3*  --    PLATELETS 10*3/mm3 223 265 272  --    INR  1.09 1.02 1.04 0.98   PROCALCITONIN ng/mL  --   --   --  0.04*       Results from last 7 days  Lab Units 02/16/18  2313   URINECX  No growth             Results from last 7 days  Lab Units 02/17/18  1614 02/17/18  0544 02/17/18  0149   TROPONIN T ng/mL 0.038* 0.034* 0.022         ASSESSMENT:   Hypertensive urgency  Altered mental status  Elevated troponin  Acute kidney injury on chronic kidney disease  Uncontrolled diabetes mellitus type 2  Strip coronary artery disease      PLAN:  Await further workup from cardiology.  Appreciate input from neurology.  I believe the patient became encephalopathic due to hypertensive emergency.  At baseline he seems to be somewhat inappropriate, borderline personality issues.          Bernardo Amaya MD  2/18/2018

## 2018-02-18 NOTE — PLAN OF CARE
Problem: Infection, Risk/Actual (Adult)  Goal: Infection Prevention/Resolution  Outcome: Ongoing (interventions implemented as appropriate)      Problem: Stroke (Ischemic) (Adult)  Goal: Signs and Symptoms of Listed Potential Problems Will be Absent or Manageable (Stroke)  Outcome: Ongoing (interventions implemented as appropriate)      Problem: Patient Care Overview (Adult)  Goal: Plan of Care Review  Outcome: Ongoing (interventions implemented as appropriate)   02/18/18 0612   Coping/Psychosocial Response Interventions   Plan Of Care Reviewed With patient   Patient Care Overview   Progress improving   Outcome Evaluation   Outcome Summary/Follow up Plan Lethargic at beginning of shift, arousable, AxO x 4. awake during PM shift, watching television, no complaints of pain or discomfort, VSS. cont to mon.

## 2018-02-18 NOTE — THERAPY EVALUATION
Acute Care - Physical Therapy Initial Evaluation  [facility]     Patient Name: Carlito Mo  : 1955  MRN: [id number]  Today's Date: 2018         Referring Physician: Monique      Admit Date: 2018     Visit Dx:    ICD-10-CM ICD-9-CM   1. Hypertensive urgency I16.0 401.9   2. Encephalopathy acute G93.40 348.30   3. Impaired mobility Z74.09 799.89     Patient Active Problem List   Diagnosis   • Depression   • Hyperlipidemia   • Chronic ischemic heart disease   • Vitamin D deficiency   • Erectile dysfunction   • Chronic fatigue   • Diabetes mellitus   • Skin lesion of chest wall   • Slow transit constipation   • Atherosclerosis of coronary artery   • Benign prostatic hyperplasia   • Chronic kidney disease   • History of basal cell carcinoma   • Essential hypertension   • Acquired hypothyroidism   • Hypertensive urgency     Past Medical History:   Diagnosis Date   • Acute sinusitis    • Anemia    • Arthritis    • Chronic ischemic heart disease    • Depression    • Diabetes mellitus type 2, uncontrolled, without complications    • Heart attack    • Hyperlipidemia    • Hypertension    • Screening for prostate cancer    • TIA (transient ischemic attack)    • Type 2 diabetes mellitus    • Vitamin D deficiency      Past Surgical History:   Procedure Laterality Date   • APPENDECTOMY N/A 2016    Dr. Alexey Dodsno   • CORONARY ARTERY BYPASS GRAFT  2001          PT ASSESSMENT (last 72 hours)      PT Evaluation       18 1400 18 1528    Rehab Evaluation    Document Type evaluation  -CK evaluation  -SL    Subjective Information agree to therapy;complains of;weakness;dizziness  -CK agree to therapy  -SL    Patient Effort, Rehab Treatment adequate  -CK adequate  -SL    Symptoms Noted During/After Treatment dizziness  -CK none  -SL    General Information    Patient Profile Review yes  -CK     Referring Physician Monique  -WILI     General Observations supine in bed, no acute distress  -CK      Precautions/Limitations fall precautions   Blood pressure  -CK     Prior Level of Function independent:  -CK     Equipment Currently Used at Home none  -CK     Plans/Goals Discussed With patient;agreed upon  -CK     Living Environment    Lives With spouse  -CK     Living Arrangements house  -CK     Transportation Available car;family or friend will provide  -CK     Clinical Impression    PT Diagnosis Impaired mobility  -CK     Criteria for Skilled Therapeutic Interventions Met yes;treatment indicated  -CK     Pathology/Pathophysiology Noted (Describe Specifically for Each System) cardiovascular;musculoskeletal;neuromuscular  -CK     Impairments Found (describe specific impairments) muscle performance;gait, locomotion, and balance;aerobic capacity/endurance  -CK     Rehab Potential good, to achieve stated therapy goals  -CK     Vital Signs    Pre Systolic BP Rehab 160   seated  -CK     Pre Treatment Diastolic BP 84  -CK     Intra Systolic BP Rehab 154   standing  -CK     Intra Treatment Diastolic BP 90  -CK     O2 Delivery Pre Treatment room air  -CK     Pain Assessment    Pain Assessment No/denies pain  -CK     Vision Assessment/Intervention    Visual Impairment WFL with corrective lenses  -CK     Cognitive Assessment/Intervention    Current Cognitive/Communication Assessment functional  -CK functional  -SL    Orientation Status oriented x 4  -CK oriented x 4  -SL    Follows Commands/Answers Questions 100% of the time;able to follow multi-step instructions  -% of the time;able to follow single-step instructions;needs increased time;needs repetition  -SL    Personal Safety decreased awareness, need for assist;decreased awareness, need for safety  -CK decreased insight to deficits  -SL    Personal Safety Interventions supervised activity;nonskid shoes/slippers when out of bed;muscle strengthening facilitated;gait belt;fall prevention program maintained  -CK     ROM (Range of Motion)    General ROM Detail BUE/BLE  AROM full  -CK     MMT (Manual Muscle Testing)    General MMT Assessment Detail BUE/BLE MMT grossly 4/5 with upper/lower quarter screens  -CK     Bed Mobility, Assessment/Treatment    Bed Mob, Supine to Sit, [place] independent  -CK     Bed Mob, Sit to Supine, [place] independent  -CK     Transfer Assessment/Treatment    Transfers, Sit-Stand [place] independent  -CK     Transfers, Stand-Sit [place] independent  -CK     Gait Assessment/Treatment    Gait, [place] Level hand held assist  -CK     Gait, Distance (Feet) 30  -CK     Gait, Gait Deviations eli decreased;decreased heel strike;narrow base;step length decreased   unsteady  -CK     Gait, Safety Issues balance decreased during turns;step length decreased  -CK     Gait, Impairments coordination impaired;impaired balance  -CK     Therapy Exercises    Bilateral Lower Extremities AROM:;15 reps;sitting;ankle pumps/circles;glut sets;hip abduction/adduction;LAQ;quad sets  -CK     Positioning and Restraints    Pre-Treatment Position in bed  -CK     Post Treatment Position chair  -CK     In Chair sitting;reclined;call light within reach;exit alarm on  -CK       02/17/18 1356 02/17/18 1210    Rehab Evaluation    Evaluation Not Performed patient unavailable for evaluation   Spoke with RN, pt off floor for ECHO. Will f/u next OT service date.  -HC     General Information    Equipment Currently Used at Home  none  -RO      02/17/18 0403 02/16/18 2331    Muscle Tone Assessment    Muscle Tone Assessment  Right-Side Extremities  -PB    Right-Side Extremities Muscle Tone Assessment moderately decreased tone  -PB moderately decreased tone  -PB    RLE Muscle Tone Assessment  --  -PB      02/16/18 2330 02/16/18 2230    General Information    Equipment Currently Used at Home  none  -PB    Living Environment    Lives With spouse  -PB     Living Arrangements house  -PB     Home Accessibility bed and bath are not on the first floor  -PB     Stair Railings at  Home inside, present at both sides  -PB     Type of Financial/Environmental Concern none  -PB     Transportation Available car;family or friend will provide  -PB       User Key  (r) = Recorded By, (t) = Taken By, (c) = Cosigned By    Initials Name Provider Type    CK Yoni Rodriguez PT Physical Therapist    LAKEISHA George, MS CCC-SLP Speech and Language Pathologist    PB Lidia Hagen, RN Registered Nurse    LAURIE Pierre OTR Occupational Therapist    IVANA Daniels RN Registered Nurse          Physical Therapy Education     Title: PT OT SLP Therapies (Active)     Topic: Physical Therapy (Active)     Point: Mobility training (Done)    Learning Progress Summary    Learner Readiness Method Response Comment Documented by Status   Patient Acceptance E VU Benefits of therapy and mobility to prevent muscle atrophy CK 02/18/18 1433 Done               Point: Precautions (Done)    Learning Progress Summary    Learner Readiness Method Response Comment Documented by Status   Patient Acceptance E VU Benefits of therapy and mobility to prevent muscle atrophy CK 02/18/18 1433 Done                      User Key     Initials Effective Dates Name Provider Type Discipline    CK 04/24/15 -  Yoni Rodriguez PT Physical Therapist PT                PT Recommendation and Plan  PT Frequency: daily  Plan of Care Review  Plan Of Care Reviewed With: patient  Progress: progress toward functional goals is gradual  Outcome Summary/Follow up Plan: Evaluation complete for skilled therapy services. Pt had not yet been OOB for ambulation activities. BP taken in sitting 160/84 and again in standing 154/90.  Hand held assist for ambulation but increased unsteadiness and reports of dizziness reported so returned to room.  Seated exercises performed and dizziness resolved. He is appropriate for skilled therapy at this time to improve his current level of function and safety in order to return to prior independent status.           IP PT Goals        02/18/18 1434          Bed Mobility PT LTG    Bed Mobility PT LTG, Date Established 02/18/18  -CK      Bed Mobility PT LTG, Time to Achieve 5 days  -CK      Bed Mobility PT LTG, Activity Type all bed mobility  -CK      Bed Mobility PT LTG, [place] Level independent  -CK      Transfer Training PT LTG    Transfer Training PT LTG, Date Established 02/18/18  -CK      Transfer Training PT LTG, Time to Achieve 5 days  -CK      Transfer Training PT LTG, Activity Type bed to chair /chair to bed  -CK      Transfer Training PT LTG, [place] Level independent  -CK      Transfer Training 2 PT LTG    Transfer Training PT 2 LTG, Date Established 02/18/18  -CK      Transfer Training PT 2 LTG, Time to Achieve 5 days  -CK      Transfer Training PT 2 LTG, Activity Type sit to stand/stand to sit  -CK      Transfer Training PT 2 LTG, [place] Level independent  -CK      Gait Training PT LTG    Gait Training Goal PT LTG, Date Established 02/18/18  -CK      Gait Training Goal PT LTG, Time to Achieve 5 days  -CK      Gait Training Goal PT LTG, [place] Level independent  -CK      Gait Training Goal PT LTG, Distance to Achieve 400 feet  -CK      Stair Training PT LTG    Stair Training Goal PT LTG, Date Established 02/18/18  -CK      Stair Training Goal PT LTG, Time to Achieve 5 days  -CK      Stair Training Goal PT LTG, Number of Steps 4  -CK      Stair Training Goal PT LTG, [place] Level independent  -CK      Stair Training Goal PT LTG, Assist Device 1 handrail  -CK        User Key  (r) = Recorded By, (t) = Taken By, (c) = Cosigned By    Initials Name Provider Type    WILI Rodriguez PT Physical Therapist                Outcome Measures       02/18/18 1400          How much help from another person do you currently need...    Turning from your back to your side while in flat bed without using bedrails? 4  -CK      Moving from lying on back to sitting on the side of a flat bed without bedrails? 4  -CK      Moving  to and from a bed to a chair (including a wheelchair)? 3  -CK      Standing up from a chair using your arms (e.g., wheelchair, bedside chair)? 3  -CK      Climbing 3-5 steps with a railing? 2  -CK      To walk in hospital room? 3  -CK      AM-PAC 6 Clicks Score 19  -CK      Functional Assessment    Outcome Measure Options AM-PAC 6 Clicks Basic Mobility (PT)  -CK        User Key  (r) = Recorded By, (t) = Taken By, (c) = Cosigned By    Initials Name Provider Type    WILI Rodriguez, PT Physical Therapist           Time Calculation:         PT Charges       02/18/18 1438          Time Calculation    Start Time 1405  -CK      Stop Time 1430  -CK      Time Calculation (min) 25 min  -CK        User Key  (r) = Recorded By, (t) = Taken By, (c) = Cosigned By    Initials Name Provider Type    WILI Rodriguez, PT Physical Therapist          Therapy Charges for Today     Code Description Service Date Service Provider Modifiers Qty    [id number]  PT THER PROC EA 15 MIN 2/18/2018 Yoni Rodriguez, PT GP 1    [id number]  PT EVAL LOW COMPLEXITY 1 2/18/2018 Yoni Rodriguez, PT GP 1          PT G-Codes  Outcome Measure Options: AM-PAC 6 Clicks Basic Mobility (PT)      Yoni Rodriguez PT  2/18/2018

## 2018-02-18 NOTE — PLAN OF CARE
Problem: Patient Care Overview (Adult)  Goal: Plan of Care Review   02/18/18 1434   Coping/Psychosocial Response Interventions   Plan Of Care Reviewed With patient   Patient Care Overview   Progress progress toward functional goals is gradual   Outcome Evaluation   Outcome Summary/Follow up Plan Evaluation complete for skilled therapy services. Pt had not yet been OOB for ambulation activities. BP taken in sitting 160/84 and again in standing 154/90. Hand held assist for ambulation but increased unsteadiness and reports of dizziness reported so returned to room. Seated exercises performed and dizziness resolved. He is appropriate for skilled therapy at this time to improve his current level of function and safety in order to return to prior independent status.        Problem: Inpatient Physical Therapy  Goal: Bed Mobility Goal LTG- PT   02/18/18 1434   Bed Mobility PT LTG   Bed Mobility PT LTG, Date Established 02/18/18   Bed Mobility PT LTG, Time to Achieve 5 days   Bed Mobility PT LTG, Activity Type all bed mobility   Bed Mobility PT LTG, [place] Level independent     Goal: Transfer Training Goal 1 LTG- PT   02/18/18 1434   Transfer Training PT LTG   Transfer Training PT LTG, Date Established 02/18/18   Transfer Training PT LTG, Time to Achieve 5 days   Transfer Training PT LTG, Activity Type bed to chair /chair to bed   Transfer Training PT LTG, [place] Level independent     Goal: Transfer Training Goal 2 LTG- PT   02/18/18 1434   Transfer Training 2 PT LTG   Transfer Training PT 2 LTG, Date Established 02/18/18   Transfer Training PT 2 LTG, Time to Achieve 5 days   Transfer Training PT 2 LTG, Activity Type sit to stand/stand to sit   Transfer Training PT 2 LTG, [place] Level independent     Goal: Gait Training Goal LTG- PT   02/18/18 1434   Gait Training PT LTG   Gait Training Goal PT LTG, Date Established 02/18/18   Gait Training Goal PT LTG, Time to Achieve 5 days   Gait Training Goal PT LTG,  [place] Level independent   Gait Training Goal PT LTG, Distance to Achieve 400 feet     Goal: Stair Training Goal LTG- PT   02/18/18 1434   Stair Training PT LTG   Stair Training Goal PT LTG, Date Established 02/18/18   Stair Training Goal PT LTG, Time to Achieve 5 days   Stair Training Goal PT LTG, Number of Steps 4   Stair Training Goal PT LTG, [place] Level independent   Stair Training Goal PT LTG, Assist Device 1 handrail

## 2018-02-18 NOTE — PLAN OF CARE
Problem: Patient Care Overview (Adult)  Goal: Plan of Care Review  Outcome: Ongoing (interventions implemented as appropriate)   02/18/18 1434 02/18/18 1447   Coping/Psychosocial Response Interventions   Plan Of Care Reviewed With patient --    Patient Care Overview   Progress progress toward functional goals is gradual --    Outcome Evaluation   Outcome Summary/Follow up Plan --  No c/o of pain. Pt ambulates well with stand by assist. Pt more alert today. Has sat in chair for most of day. VSS. Will continue to monitor.     Goal: Adult Individualization and Mutuality  Outcome: Ongoing (interventions implemented as appropriate)    Goal: Discharge Needs Assessment  Outcome: Ongoing (interventions implemented as appropriate)

## 2018-02-19 LAB
ANION GAP SERPL CALCULATED.3IONS-SCNC: 13.3 MMOL/L
BASOPHILS # BLD AUTO: 0.01 10*3/MM3 (ref 0–0.2)
BASOPHILS NFR BLD AUTO: 0.1 % (ref 0–1.5)
BUN BLD-MCNC: 21 MG/DL (ref 8–23)
BUN/CREAT SERPL: 17.6 (ref 7–25)
CALCIUM SPEC-SCNC: 8.7 MG/DL (ref 8.6–10.5)
CHLORIDE SERPL-SCNC: 98 MMOL/L (ref 98–107)
CO2 SERPL-SCNC: 26.7 MMOL/L (ref 22–29)
CREAT BLD-MCNC: 1.19 MG/DL (ref 0.76–1.27)
CREAT BLDA-MCNC: 1.3 MG/DL (ref 0.6–1.3)
DEPRECATED RDW RBC AUTO: 40.6 FL (ref 37–54)
EOSINOPHIL # BLD AUTO: 0.18 10*3/MM3 (ref 0–0.7)
EOSINOPHIL NFR BLD AUTO: 1.9 % (ref 0.3–6.2)
ERYTHROCYTE [DISTWIDTH] IN BLOOD BY AUTOMATED COUNT: 13.8 % (ref 11.5–14.5)
GFR SERPL CREATININE-BSD FRML MDRD: 62 ML/MIN/1.73
GLUCOSE BLD-MCNC: 67 MG/DL (ref 65–99)
GLUCOSE BLDC GLUCOMTR-MCNC: 110 MG/DL (ref 70–130)
GLUCOSE BLDC GLUCOMTR-MCNC: 141 MG/DL (ref 70–130)
GLUCOSE BLDC GLUCOMTR-MCNC: 228 MG/DL (ref 70–130)
GLUCOSE BLDC GLUCOMTR-MCNC: 362 MG/DL (ref 70–130)
GLUCOSE BLDC GLUCOMTR-MCNC: 59 MG/DL (ref 70–130)
GLUCOSE BLDC GLUCOMTR-MCNC: 67 MG/DL (ref 70–130)
HCT VFR BLD AUTO: 36.2 % (ref 40.4–52.2)
HGB BLD-MCNC: 11.7 G/DL (ref 13.7–17.6)
IMM GRANULOCYTES # BLD: 0.02 10*3/MM3 (ref 0–0.03)
IMM GRANULOCYTES NFR BLD: 0.2 % (ref 0–0.5)
INR PPP: 1.06 (ref 0.9–1.1)
LYMPHOCYTES # BLD AUTO: 2.32 10*3/MM3 (ref 0.9–4.8)
LYMPHOCYTES NFR BLD AUTO: 24 % (ref 19.6–45.3)
MAGNESIUM SERPL-MCNC: 1.9 MG/DL (ref 1.6–2.4)
MCH RBC QN AUTO: 26 PG (ref 27–32.7)
MCHC RBC AUTO-ENTMCNC: 32.3 G/DL (ref 32.6–36.4)
MCV RBC AUTO: 80.4 FL (ref 79.8–96.2)
MONOCYTES # BLD AUTO: 0.7 10*3/MM3 (ref 0.2–1.2)
MONOCYTES NFR BLD AUTO: 7.2 % (ref 5–12)
NEUTROPHILS # BLD AUTO: 6.45 10*3/MM3 (ref 1.9–8.1)
NEUTROPHILS NFR BLD AUTO: 66.6 % (ref 42.7–76)
PLATELET # BLD AUTO: 273 10*3/MM3 (ref 140–500)
PMV BLD AUTO: 10.6 FL (ref 6–12)
POTASSIUM BLD-SCNC: 3.4 MMOL/L (ref 3.5–5.2)
POTASSIUM BLD-SCNC: 4.4 MMOL/L (ref 3.5–5.2)
PROTHROMBIN TIME: 13.6 SECONDS (ref 11.7–14.2)
RBC # BLD AUTO: 4.5 10*6/MM3 (ref 4.6–6)
SODIUM BLD-SCNC: 138 MMOL/L (ref 136–145)
WBC NRBC COR # BLD: 9.68 10*3/MM3 (ref 4.5–10.7)

## 2018-02-19 PROCEDURE — 83735 ASSAY OF MAGNESIUM: CPT | Performed by: INTERNAL MEDICINE

## 2018-02-19 PROCEDURE — 85610 PROTHROMBIN TIME: CPT | Performed by: INTERNAL MEDICINE

## 2018-02-19 PROCEDURE — 97110 THERAPEUTIC EXERCISES: CPT

## 2018-02-19 PROCEDURE — 84132 ASSAY OF SERUM POTASSIUM: CPT | Performed by: INTERNAL MEDICINE

## 2018-02-19 PROCEDURE — 25010000002 POTASSIUM CHLORIDE PER 2 MEQ OF POTASSIUM: Performed by: INTERNAL MEDICINE

## 2018-02-19 PROCEDURE — 99233 SBSQ HOSP IP/OBS HIGH 50: CPT | Performed by: NURSE PRACTITIONER

## 2018-02-19 PROCEDURE — 82962 GLUCOSE BLOOD TEST: CPT

## 2018-02-19 PROCEDURE — 80048 BASIC METABOLIC PNL TOTAL CA: CPT | Performed by: INTERNAL MEDICINE

## 2018-02-19 PROCEDURE — 99232 SBSQ HOSP IP/OBS MODERATE 35: CPT | Performed by: NURSE PRACTITIONER

## 2018-02-19 PROCEDURE — 63710000001 INSULIN ASPART PER 5 UNITS: Performed by: INTERNAL MEDICINE

## 2018-02-19 PROCEDURE — 97165 OT EVAL LOW COMPLEX 30 MIN: CPT

## 2018-02-19 PROCEDURE — 85025 COMPLETE CBC W/AUTO DIFF WBC: CPT | Performed by: INTERNAL MEDICINE

## 2018-02-19 RX ORDER — ESCITALOPRAM OXALATE 10 MG/1
10 TABLET ORAL DAILY
Status: DISCONTINUED | OUTPATIENT
Start: 2018-02-19 | End: 2018-02-20 | Stop reason: HOSPADM

## 2018-02-19 RX ORDER — METOPROLOL TARTRATE 100 MG/1
100 TABLET ORAL EVERY 12 HOURS SCHEDULED
Status: DISCONTINUED | OUTPATIENT
Start: 2018-02-19 | End: 2018-02-20 | Stop reason: HOSPADM

## 2018-02-19 RX ORDER — VALSARTAN 320 MG/1
320 TABLET ORAL DAILY
Status: DISCONTINUED | OUTPATIENT
Start: 2018-02-19 | End: 2018-02-20 | Stop reason: HOSPADM

## 2018-02-19 RX ADMIN — CYANOCOBALAMIN TAB 500 MCG 1000 MCG: 500 TAB at 08:53

## 2018-02-19 RX ADMIN — ESCITALOPRAM 10 MG: 10 TABLET, FILM COATED ORAL at 13:09

## 2018-02-19 RX ADMIN — CLOPIDOGREL 75 MG: 75 TABLET, FILM COATED ORAL at 08:53

## 2018-02-19 RX ADMIN — POTASSIUM CHLORIDE 10 MEQ: 2 INJECTION, SOLUTION, CONCENTRATE INTRAVENOUS at 04:50

## 2018-02-19 RX ADMIN — AMLODIPINE BESYLATE 5 MG: 5 TABLET ORAL at 08:53

## 2018-02-19 RX ADMIN — INSULIN DETEMIR 20 UNITS: 100 INJECTION, SOLUTION SUBCUTANEOUS at 21:13

## 2018-02-19 RX ADMIN — INSULIN ASPART 12 UNITS: 100 INJECTION, SOLUTION INTRAVENOUS; SUBCUTANEOUS at 21:13

## 2018-02-19 RX ADMIN — ASPIRIN 81 MG: 81 TABLET ORAL at 08:53

## 2018-02-19 RX ADMIN — METOPROLOL TARTRATE 100 MG: 100 TABLET, FILM COATED ORAL at 21:12

## 2018-02-19 RX ADMIN — ATORVASTATIN CALCIUM 80 MG: 80 TABLET, FILM COATED ORAL at 21:12

## 2018-02-19 RX ADMIN — INSULIN ASPART 5 UNITS: 100 INJECTION, SOLUTION INTRAVENOUS; SUBCUTANEOUS at 12:08

## 2018-02-19 RX ADMIN — VALSARTAN 320 MG: 320 TABLET, FILM COATED ORAL at 14:57

## 2018-02-19 RX ADMIN — POTASSIUM CHLORIDE 10 MEQ: 2 INJECTION, SOLUTION, CONCENTRATE INTRAVENOUS at 06:01

## 2018-02-19 RX ADMIN — METOPROLOL TARTRATE 100 MG: 100 TABLET, FILM COATED ORAL at 14:56

## 2018-02-19 NOTE — THERAPY DISCHARGE NOTE
Acute Care - Occupational Therapy Initial Eval/Discharge  [facility]     Patient Name: Carlito Mo  : 1955  MRN: [id number]  Today's Date: 2018        Referring Physician:  Maureen      Admit Date: 2018       ICD-10-CM ICD-9-CM   1. Hypertensive urgency I16.0 401.9   2. Encephalopathy acute G93.40 348.30   3. Impaired mobility Z74.09 799.89     Patient Active Problem List   Diagnosis   • Depression   • Hyperlipidemia   • Chronic ischemic heart disease   • Vitamin D deficiency   • Erectile dysfunction   • Chronic fatigue   • Diabetes mellitus   • Skin lesion of chest wall   • Slow transit constipation   • Atherosclerosis of coronary artery   • Benign prostatic hyperplasia   • Chronic kidney disease   • History of basal cell carcinoma   • Essential hypertension   • Acquired hypothyroidism   • Hypertensive urgency     Past Medical History:   Diagnosis Date   • Acute sinusitis    • Anemia    • Arthritis    • Chronic ischemic heart disease    • Depression    • Diabetes mellitus type 2, uncontrolled, without complications    • Heart attack    • Hyperlipidemia    • Hypertension    • Screening for prostate cancer    • TIA (transient ischemic attack)    • Type 2 diabetes mellitus    • Vitamin D deficiency      Past Surgical History:   Procedure Laterality Date   • APPENDECTOMY N/A 2016    Dr. Alexey Dodson   • CORONARY ARTERY BYPASS GRAFT  2001          OT ASSESSMENT FLOWSHEET (last 72 hours)      OT Evaluation       18 1003 18 0953 18 1400 18 1528 18 1356    Rehab Evaluation    Document Type  evaluation;discharge summary  -LE evaluation  -CK evaluation  -SL     Subjective Information  agree to therapy  -LE agree to therapy;complains of;weakness;dizziness  -CK agree to therapy  -SL     Evaluation Not Performed     patient unavailable for evaluation   Spoke with RN, pt off floor for ECHO. Will f/u next OT service date.  -HC    Patient Effort, Rehab Treatment   "good  -LE adequate  -CK adequate  -SL     Symptoms Noted During/After Treatment   dizziness  -CK none  -SL     General Information    Patient Profile Review  yes  -LE yes  -CK      Referring Physician   Maureen Amaya  -CK      General Observations  sitting in chair.  aid in room.  just finished bath with set up per aid/pt  -LE supine in bed, no acute distress  -CK      Pertinent History Of Current Problem  from home with \"incoherence\".  states resolved.   -LE       Precautions/Limitations  fall precautions  -LE fall precautions   Blood pressure  -CK      Prior Level of Function  independent:;driving;ADL's;transfer  -LE independent:  -CK      Equipment Currently Used at Home  grab bar  -LE none  -CK      Plans/Goals Discussed With   patient;agreed upon  -CK      Living Environment    Lives With  spouse  -LE spouse  -CK      Living Arrangements   house  -CK      Transportation Available   car;family or friend will provide  -CK      Clinical Impression    OT Diagnosis  need for assist with personal care  -LE       Criteria for Skilled Therapeutic Interventions Met  current level of function same as previous level of function  -LE       Therapy Frequency  evaluation only  -LE       Anticipated Discharge Disposition home with assist  -LE home with assist   had d/c home last admit with wife supervision for safety  -LE       Vital Signs    Pre Systolic BP Rehab   160   seated  -CK      Pre Treatment Diastolic BP   84  -CK      Intra Systolic BP Rehab   154   standing  -CK      Intra Treatment Diastolic BP   90  -CK      O2 Delivery Pre Treatment  room air  -LE room air  -CK      Pain Assessment    Pain Assessment  No/denies pain  -LE No/denies pain  -CK      Vision Assessment/Intervention    Visual Impairment  --   states usually wears glasses, pt going to ask wife to bring  -LE WFL with corrective lenses  -CK      Vision Comment  denies gross changes with vision  -LE       Cognitive Assessment/Intervention    Current " Cognitive/Communication Assessment  functional  -LE functional  -CK functional  -SL     Orientation Status  oriented x 4  -LE oriented x 4  -CK oriented x 4  -SL     Follows Commands/Answers Questions   100% of the time;able to follow multi-step instructions  -% of the time;able to follow single-step instructions;needs increased time;needs repetition  -SL     Personal Safety  --   slower processing.    -LE decreased awareness, need for assist;decreased awareness, need for safety  -CK decreased insight to deficits  -SL     Personal Safety Interventions  gait belt;fall prevention program maintained;nonskid shoes/slippers when out of bed  -LE supervised activity;nonskid shoes/slippers when out of bed;muscle strengthening facilitated;gait belt;fall prevention program maintained  -CK      ROM (Range of Motion)    General ROM  no range of motion deficits identified   B UE  -LE       General ROM Detail   BUE/BLE AROM full  -CK      MMT (Manual Muscle Testing)    General MMT Assessment  --   B UE 4+/5  -LE       General MMT Assessment Detail   BUE/BLE MMT grossly 4/5 with upper/lower quarter screens  -CK      Bed Mobility, Assessment/Treatment    Bed Mob, Supine to Sit, [place]   independent  -CK      Bed Mob, Sit to Supine, [place]   independent  -CK      Bed Mobility, Comment  already up in chair  -LE       Transfer Assessment/Treatment    Transfers, Sit-Stand [place]  set up required   OT manages IV pole/ catheter.   -LE independent  -CK      Transfers, Stand-Sit [place]  set up required  -LE independent  -CK      Toilet Transfer, [place]  set up required   OT manages IV pole/ catheter.  -LE       Functional Mobility    Functional Mobility- Ind. Level  conditional independence;set up required  -LE       Functional Mobility- Device  --   OT manages IV pole/ catheter.  -LE       Functional Mobility-Distance (Feet)  20   chair to bathroom to toilet to sink to chair  -LE       Functional  Mobility- Comment  --   no LOB  -LE       Upper Body Bathing Assessment/Training    UB Bathing Assess/Train, Comment  set up per aid  -LE       Lower Body Bathing Assessment/Training    LB Bathing Assess/Train, Comment  set up per aid  -LE       Lower Body Dressing Assessment/Training    LB Dressing Assess/Train, Clothing Type  doffing:;donning:;slipper socks  -LE       LB Dressing Assess/Train, Position  supported sitting  -LE       LB Dressing Assess/Train, [place]  conditional independence  -LE       Toileting Assessment/Training    Toileting Assess/Train, Assistive Device  --   pt sits on toilet, attempt to have BM, only flatulence.   -LE       Toileting Assess/Train, Position  sitting;standing  -LE       Toileting Assess/Train, Indepen Level  set up required   OT manage catheter.  pt does hygiene after flatulence.   -LE       Grooming Assessment/Training    Grooming Assess/Train, Position  standing;sink side  -LE       Grooming Assess/Train, Indepen Level  conditional independence  -LE       Therapy Exercises    Bilateral Lower Extremities   AROM:;15 reps;sitting;ankle pumps/circles;glut sets;hip abduction/adduction;LAQ;quad sets  -CK      Sensory Assessment/Intervention    Sensory Impairment  --   denies numb/tingle, clumsiness with B UE  -LE       Positioning and Restraints    Pre-Treatment Position  sitting in chair/recliner  -LE in bed  -CK      Post Treatment Position  chair  -LE chair  -CK      In Chair  notified nsg;reclined;call light within reach;encouraged to call for assist;exit alarm on   with aid  -LE sitting;reclined;call light within reach;exit alarm on  -CK        02/17/18 1210 02/17/18 0403 02/16/18 2331 02/16/18 2330 02/16/18 2230    General Information    Equipment Currently Used at Home none  -RO    none  -PB    Living Environment    Lives With    spouse  -PB     Living Arrangements    house  -PB     Home Accessibility    bed and bath are not on the first floor  -PB     Stair Railings  at Home    inside, present at both sides  -PB     Type of Financial/Environmental Concern    none  -PB     Transportation Available    car;family or friend will provide  -PB     Functional Level Prior    Ambulation 0-->independent  -RO   0-->independent  -PB 0-->independent  -PB    Transferring 0-->independent  -RO   0-->independent  -PB 0-->independent  -PB    Toileting 0-->independent  -RO   0-->independent  -PB 0-->independent  -PB    Bathing 0-->independent  -RO   0-->independent  -PB 0-->independent  -PB    Dressing 0-->independent  -RO   0-->independent  -PB 0-->independent  -PB    Eating 0-->independent  -RO   0-->independent  -PB 0-->independent  -PB    Communication 0-->understands/communicates without difficulty  -RO   0-->understands/communicates without difficulty  -PB 0-->understands/communicates without difficulty  -PB    Swallowing 0-->swallows foods/liquids without difficulty  -RO   0-->swallows foods/liquids without difficulty  -PB 0-->swallows foods/liquids without difficulty  -PB    Muscle Tone Assessment    Muscle Tone Assessment   Right-Side Extremities  -PB      Right-Side Extremities Muscle Tone Assessment  moderately decreased tone  -PB moderately decreased tone  -PB      RLE Muscle Tone Assessment   --  -PB        User Key  (r) = Recorded By, (t) = Taken By, (c) = Cosigned By    Initials Name Effective Dates    ANAHI Alisia Barlow, OTR 04/13/15 -     CK Yoni Rodriguez, PT 04/24/15 -     SL Heike George, MS CCC-SLP 04/13/15 -     PB Lidia Hagen, RN 06/16/16 -     LAURIE Pierre, OTR 08/17/17 -     RO Merari Daniels RN 11/09/17 -           Occupational Therapy Education     Title: PT OT SLP Therapies (Active)     Topic: Occupational Therapy (Resolved)     Point: ADL training (Resolved)    Description: Instruct learner(s) on proper safety adaptation and remediation techniques during self care or transfers.   Instruct in proper use of assistive devices.    Learning Progress Summary    Learner  Readiness Method Response Comment Documented by Status   Patient Acceptance D,TB,E VU,DU Review use of grab bar in shower at home.  Recommend cont SBA while in hospital due to IV/catheter. LE 02/19/18 1000 Done                      User Key     Initials Effective Dates Name Provider Type Discipline    ANAHI 04/13/15 -  FADI Pepe Occupational Therapist OT                OT Recommendation and Plan  Anticipated Discharge Disposition: home with assist  Therapy Frequency: evaluation only  Plan of Care Review  Plan Of Care Reviewed With: patient  Outcome Summary/Follow up Plan: Pt is performing at baseline for ADL and bathroom mobility.  Assist only for IV pole/catheter management.  UE function intact. Pt denies ADL concerns for d/c home.  Will sign off for OT at this time.  Discuss with RN.            OT Goals       02/19/18 1001          Patient Education OT LTG    Patient Education OT LTG, Date Established 02/19/18  -LE      Patient Education OT LTG, Time to Achieve 1 day  -LE      Patient Education OT LTG, Education Type home safety  -LE      Patient Education OT LTG, Education Understanding verbalizes understanding  -LE      Patient Education OT LTG Outcome goal met  -LE        User Key  (r) = Recorded By, (t) = Taken By, (c) = Cosigned By    Initials Name Provider Type    FADI Hartley Occupational Therapist                Outcome Measures       02/19/18 1003 02/19/18 1000 02/18/18 1400    How much help from another person do you currently need...    Turning from your back to your side while in flat bed without using bedrails?   4  -CK    Moving from lying on back to sitting on the side of a flat bed without bedrails?   4  -CK    Moving to and from a bed to a chair (including a wheelchair)?   3  -CK    Standing up from a chair using your arms (e.g., wheelchair, bedside chair)?   3  -CK    Climbing 3-5 steps with a railing?   2  -CK    To walk in hospital room?   3  -CK    AM-PAC 6 Clicks Score   19  -CK     How much help from another is currently needed...    Putting on and taking off regular lower body clothing? 4  -LE      Bathing (including washing, rinsing, and drying) 4  -LE      Toileting (which includes using toilet bed pan or urinal) 4  -LE      Putting on and taking off regular upper body clothing 4  -LE      Taking care of personal grooming (such as brushing teeth) 4  -LE      Eating meals 4  -LE      Score 24  -LE      Functional Assessment    Outcome Measure Options  AM-PAC 6 Clicks Daily Activity (OT)  -LE AM-PAC 6 Clicks Basic Mobility (PT)  -CK      User Key  (r) = Recorded By, (t) = Taken By, (c) = Cosigned By    Initials Name Provider Type    FADI Hartley Occupational Therapist    WILI Rodriguez, PT Physical Therapist          Time Calculation:         Time Calculation- OT       02/19/18 1003          Time Calculation- OT    OT Start Time 0935  -LE      OT Stop Time 0949  -LE      OT Time Calculation (min) 14 min  -LE      OT Received On 02/19/18  -LE        User Key  (r) = Recorded By, (t) = Taken By, (c) = Cosigned By    Initials Name Provider Type    FADI Hartley Occupational Therapist          Therapy Charges for Today     Code Description Service Date Service Provider Modifiers Qty    [id number]  OT EVAL LOW COMPLEXITY 2 2/19/2018 FADI Pepe GO 1               OT Discharge Summary  Anticipated Discharge Disposition: home with assist  Reason for Discharge: At baseline function  Discharge Destination: Home with assist    FADI Pepe  2/19/2018

## 2018-02-19 NOTE — NURSING NOTE
"Diabetes Education  Assessment/Teaching    Patient Name:  Carlito Mo  YOB: 1955  MRN: [id number]  Admit Date:  2/16/2018      Assessment Date:  2/19/2018       Most Recent Value    General Information      Referral From:  MD cardoza    Height  185.4 cm (72.99\")    Height Method  Stated    Weight  92.3 kg (203 lb 7.8 oz)    What type of diabetes do you have?  Type 2    Length of Diabetes Diagnosis  10 + years    Education Preferences     Barriers to Learning  -- [emotional. pt presents with very flat affect. ]    Assessment Topics     Reducing Risk - Assessment  Needs education    Healthy Coping - Assessment  Needs education    DM Goals To resume counseling sessions for mental health.                Most Recent Value    DM Education Needs     Medication  Insulin    Problem Solving  Hyperglycemia    Reducing Risks  Eye exam, Foot care, Neuropathy [Advise pt to start daily foot care/exam at home. ]    Healthy Coping  Depressed. Pt has supportive spouse at bedside. pt reports being depressed. Encourage pt to f/u at [facility] for counseling.     Discharge Plan  Home, Follow-up with PCP, Follow-up with endocrinolgoist [pt reports outpt endo f/u with Matilda Velez. ]    Motivation  Moderate    Teaching Method  Discussion    Patient Response  Verbalized understanding, Needs reinforcement            Electronically signed by:  Lindsay Reza, RN, BSN, CDE   02/19/18 4:57 PM  "

## 2018-02-19 NOTE — PROGRESS NOTES
"  PROGRESS NOTE  Patient Name: Carlito Mo  Age/Sex: 62 y.o. male  : 1955  MRN: [id number]    Date of Admission: 2018  Date of Encounter Visit: 18   LOS: 3 days   Patient Care Team:  García Rehman MD as PCP - General (Family Medicine)  García Rehman MD as PCP - Family Medicine    Chief Complaint: Recurrent CVA presented with altered mental status that has cleared    Interval History: Admitted with altered mental status brain imaging showed new multiple scattered arterial infarct that are likely embolic.  Patient had a recent admission in December with a stroke that he recovered from and at the time NEISHA and Holter were normal, the plan is to go home on a Zio patch and he does have some hypertensive emergency and he was just resumed on his on his home medication and the plan is to discharge home tomorrow if his blood pressure profile is better    REVIEW OF SYSTEMS:   No fever no chills no night sweats no cough no chest pain no dysphagia and no aphasia no tingling or numbness no complaints from the balance problem  Ventilator/Non-Invasive Ventilation Settings     None            Vital Signs  Temp:  [97.5 °F (36.4 °C)-98.7 °F (37.1 °C)] 97.5 °F (36.4 °C)  Heart Rate:  [60-76] 73  Resp:  [18] 18  BP: (137-163)/() 157/111  SpO2:  [97 %-99 %] 98 %  on    O2 Device: room air    Intake/Output Summary (Last 24 hours) at 18 1837  Last data filed at 18 1512   Gross per 24 hour   Intake                0 ml   Output              850 ml   Net             -850 ml     Flowsheet Rows         First Filed Value    Admission Height  185.4 cm (73\") [Previously documented height] Documented at 2018    Admission Weight  86.2 kg (190 lb) Documented at 2018        Body mass index is 26.85 kg/(m^2).  Last 3 weights    18  8812   Weight: 86.2 kg (190 lb) 92.3 kg (203 lb 7.8 oz)       Physical Exam:  GEN:  No acute distress, alert, cooperative, well developed   EYES: "   Sclera clear. No icterus. PERRL. Normal EOM  ENT:   External ears/nose normal, no oral lesions, no thrush, mucous membranes moist  NECK:  Supple, midline trachea, no JVD  LUNGS: Normal chest on inspection, CTAB, no wheezes. No rhonchi. No crackles. Respirations regular, even and unlabored.   CV:  Regular rhythm and rate. Normal S1/S2. No murmurs, gallops, or rubs noted.  ABD:  Soft, non-tender and non-distended. Normal bowel sounds. No guarding  EXT:  Moves all extremities well. No cyanosis. No redness. No edema.   Skin: dry, intact, no bleeding    Results Review:        Results from last 7 days  Lab Units 02/19/18  1140 02/19/18  0332 02/18/18  0503 02/17/18  0544 02/17/18  0149 02/16/18 2025 02/16/18 2020   SODIUM mmol/L  --  138 140 136 136  --  131*   POTASSIUM mmol/L 4.4 3.4* 3.9 3.7 3.8  --  4.4   CHLORIDE mmol/L  --  98 101 96* 96*  --  91*   CO2 mmol/L  --  26.7 28.2 29.3* 25.2  --  24.6   BUN mg/dL  --  21 21 21 20  --  20   CREATININE mg/dL  --  1.19 1.44* 1.54* 1.27 1.30 1.38*   CALCIUM mg/dL  --  8.7 8.4* 9.0 9.1  --  9.7   AST (SGOT) U/L  --   --   --   --   --   --  15   ALT (SGPT) U/L  --   --   --   --   --   --  14   ANION GAP mmol/L  --  13.3 10.8 10.7 14.8  --  15.4   ALBUMIN g/dL  --   --   --   --   --   --  3.70       Results from last 7 days  Lab Units 02/17/18  1614 02/17/18  0544 02/17/18  0149   TROPONIN T ng/mL 0.038* 0.034* 0.022               Results from last 7 days  Lab Units 02/19/18  0332 02/18/18  0503 02/17/18  0544 02/17/18  0149 02/16/18 2019   WBC 10*3/mm3 9.68 9.43 13.80* 16.12* 6.61   HEMOGLOBIN g/dL 11.7* 10.7* 11.6* 11.3* 12.3*   HEMATOCRIT % 36.2* 34.2* 36.2* 34.5* 37.5*   PLATELETS 10*3/mm3 273 223 265 272 262   MCV fL 80.4 81.8 79.4* 79.1* 78.9*   NEUTROPHIL % % 66.6 70.3 90.1* 91.6* 70.0   LYMPHOCYTE % % 24.0 21.8 6.9* 5.3* 22.5   MONOCYTES % % 7.2 6.7 2.9* 2.9* 5.3   EOSINOPHIL % % 1.9 0.8 0.0* 0.0* 1.7   BASOPHIL % % 0.1 0.1 0.0 0.0 0.2   IMM GRAN % % 0.2 0.3 0.1  0.2 0.3       Results from last 7 days  Lab Units 02/19/18  0332 02/18/18  0503 02/17/18  0544  02/16/18 2020   INR  1.06 1.09 1.02  < > 0.98   APTT seconds  --   --   --   --  22.8   < > = values in this interval not displayed.    Results from last 7 days  Lab Units 02/19/18  0332 02/18/18  0503 02/17/18  0149 02/16/18 2020   MAGNESIUM mg/dL 1.9 2.2 1.4* 1.6       Results from last 7 days  Lab Units 02/18/18  0503 02/16/18 2020   CHOLESTEROL mg/dL 257* 331*   TRIGLYCERIDES mg/dL 84 416*   HDL CHOL mg/dL 50 47           Results from last 7 days  Lab Units 02/17/18 0149   HEMOGLOBIN A1C % 11.60*     Glucose   Date/Time Value Ref Range Status   02/19/2018 1701 141 (H) 70 - 130 mg/dL Final   02/19/2018 1057 228 (H) 70 - 130 mg/dL Final   02/19/2018 0651 110 70 - 130 mg/dL Final   02/19/2018 0625 67 (L) 70 - 130 mg/dL Final   02/19/2018 0600 59 (L) 70 - 130 mg/dL Final   02/18/2018 2100 252 (H) 70 - 130 mg/dL Final   02/18/2018 1708 187 (H) 70 - 130 mg/dL Final   02/18/2018 1112 265 (H) 70 - 130 mg/dL Final       Results from last 7 days  Lab Units 02/16/18 2020   PROCALCITONIN ng/mL 0.04*       Results from last 7 days  Lab Units 02/16/18  2313   URINECX  No growth       Results from last 7 days  Lab Units 02/16/18  2313   NITRITE UA  Negative   WBC UA /HPF 0-2   BACTERIA UA /HPF None Seen   SQUAM EPITHEL UA /HPF None Seen   URINECX  No growth               TTE 2/17/18:    · Calculated EF = 62.9%  · Left ventricular systolic function is normal.  · Left ventricular diastolic dysfunction (grade I) consistent with impaired relaxation.  · Mild mitral valve regurgitation is present  · Saline test results are negative       Imaging:   Imaging Results (all)     Procedure Component Value Units Date/Time    XR Chest 1 View [[id number]] Collected:  02/16/18 2119     Updated:  02/19/18 0746    Narrative:       X-RAY CHEST 1 VIEW.     HISTORY: Acute stroke.     COMPARISON: 11/30/2017.     FINDINGS:  Cardiomediastinal  silhouette is within normal limits.         There is no consolidation or effusion. Lung volumes are low. Pulmonary  congestion remains.          Impression:       Pulmonary congestion, no definite consolidation or effusion.         This report was finalized on 2/19/2018 7:43 AM by Dr. Steve Elise MD.             I reviewed the patient's new clinical results.  I personally viewed and interpreted the patient's imaging results:        Medication Review:     amLODIPine 5 mg Oral Q24H   aspirin 81 mg Oral Daily   atorvastatin 80 mg Oral Nightly   clopidogrel 75 mg Oral Daily   escitalopram 10 mg Oral Daily   insulin aspart 0-14 Units Subcutaneous 4x Daily With Meals & Nightly   insulin detemir 20 Units Subcutaneous Nightly   metoprolol tartrate 100 mg Oral Q12H   valsartan 320 mg Oral Daily   vitamin B-12 1,000 mcg Oral Daily            ASSESSMENT:   Acute CVA with history of recent stroke back in December 2017, being discharged back then on aspirin and Lipitor with recurrence  Hypertensive emergency  Coronary artery disease  Chronic kidney disease  Hypertension  Hyperlipidemia  Uncontrolled diabetes mellitus    PLAN:  Zio patch at discharge, on aspirin and Plavix and Lipitor  The repeat imaging showed multiple distribution of small infarct that are likely cardioembolic, and the patient is being managed by cardiology and neurology.  Discussed with     Disposition:     Ivelisse Nicholson MD  02/19/18  6:37 PM      EMR Dragon/Transcription:   Much of this encounter note is an electronic transcription/translation of spoken language to printed text. The electronic translation of spoken language may permit erroneous, or at times, nonsensical words or phrases to be inadvertently transcribed; Although I have reviewed the note for such errors, some may still exist.

## 2018-02-19 NOTE — DISCHARGE INSTRUCTIONS
Patient Risk Factors  -History of Stroke  -Hypertension  -Diabetes Mellitus  -High Cholesterol  -LDL= 190  -A1C=11    Please continue taking Norvasc, Diovan, Lopressor (high blood pressure): aspirin, Plavix (antiplatelet) until further notice from MD. Please keep all follow up appointments.

## 2018-02-19 NOTE — PLAN OF CARE
Problem: Fall Risk (Adult)  Goal: Absence of Falls  Outcome: Ongoing (interventions implemented as appropriate)      Problem: Infection, Risk/Actual (Adult)  Goal: Infection Prevention/Resolution  Outcome: Ongoing (interventions implemented as appropriate)      Problem: Stroke (Ischemic) (Adult)  Goal: Signs and Symptoms of Listed Potential Problems Will be Absent or Manageable (Stroke)  Outcome: Ongoing (interventions implemented as appropriate)   02/19/18 0319   Stroke (Ischemic)   Problems Assessed (Stroke (Ischemic)/TIA) all   Problems Present (Stroke (Ischemic)/TIA) none       Problem: Confusion, Acute (Adult)  Goal: Cognitive/Functional Impairments Minimized  Outcome: Ongoing (interventions implemented as appropriate)    Goal: Safety  Outcome: Ongoing (interventions implemented as appropriate)      Problem: Diabetes, Type 2 (Adult)  Goal: Signs and Symptoms of Listed Potential Problems Will be Absent or Manageable (Diabetes, Type 2)  Outcome: Ongoing (interventions implemented as appropriate)   02/19/18 0319   Diabetes, Type 2   Problems Assessed (Type 2 Diabetes) all   Problems Present (Type 2 Diabetes) impaired glycemic control       Problem: Hypertensive Disease/Crisis (Arterial) (Adult)  Goal: Signs and Symptoms of Listed Potential Problems Will be Absent or Manageable (Hypertensive Disease/Crisis)  Outcome: Ongoing (interventions implemented as appropriate)   02/19/18 0319   Hypertensive Disease/Crisis (Arterial)   Problems Assessed (Hypertensive Disease/Crisis (Arterial)) all   Problems Present (Hypertensive Disease/Crisis (Arterial)) none       Problem: Patient Care Overview (Adult)  Goal: Plan of Care Review  Outcome: Ongoing (interventions implemented as appropriate)   02/19/18 1729   Coping/Psychosocial Response Interventions   Plan Of Care Reviewed With patient   Patient Care Overview   Progress no change   Outcome Evaluation   Outcome Summary/Follow up Plan NIH 0. vital signs stable. LOCX4. no C/O  nausea or vomiting. Chou Cath D/C'ed per protocol this shift. PT started on Lexapro 10 mg PO, no adverse reaction noted this shift. PT Lopressor 100 mg & Diovan 320 mg restarted this shift no adverse reaction noted this shift.

## 2018-02-19 NOTE — THERAPY TREATMENT NOTE
Acute Care - Physical Therapy Treatment Note  [facility]     Patient Name: Carlito Mo  : 1955  MRN: [id number]  Today's Date: 2018        Referring Physician:  Maureen    Admit Date: 2018    Visit Dx:    ICD-10-CM ICD-9-CM   1. Hypertensive urgency I16.0 401.9   2. Encephalopathy acute G93.40 348.30   3. Impaired mobility Z74.09 799.89     Patient Active Problem List   Diagnosis   • Depression   • Hyperlipidemia   • Chronic ischemic heart disease   • Vitamin D deficiency   • Erectile dysfunction   • Chronic fatigue   • Diabetes mellitus   • Skin lesion of chest wall   • Slow transit constipation   • Atherosclerosis of coronary artery   • Benign prostatic hyperplasia   • Chronic kidney disease   • History of basal cell carcinoma   • Essential hypertension   • Acquired hypothyroidism   • Hypertensive urgency               Adult Rehabilitation Note       18 1022          Rehab Assessment/Intervention    Discipline (P)  physical therapist  -BJ      Document Type (P)  therapy note (daily note)  -BJ      Subjective Information (P)  agree to therapy;complains of;fatigue   States he didn't sleep last night  -BJ      Patient Effort, Rehab Treatment (P)  good  -BJ      Precautions/Limitations (P)  fall precautions  -BJ      Recorded by [BJ] Scooby Gorman, PT Student      Pain Assessment    Pain Assessment (P)  No/denies pain  -BJ      Recorded by [BJ] Scooby Gorman, PT Student      Cognitive Assessment/Intervention    Current Cognitive/Communication Assessment (P)  functional  -BJ      Orientation Status (P)  oriented x 4  -BJ      Follows Commands/Answers Questions (P)  100% of the time  -BJ      Personal Safety (P)  WNL/WFL  -BJ      Personal Safety Interventions (P)  fall prevention program maintained;gait belt;muscle strengthening facilitated;nonskid shoes/slippers when out of bed  -BJ      Recorded by [BJ] Scooby Gorman, PT Student      ROM (Range of Motion)    General ROM (P)  no range  of motion deficits identified  -BJ      Recorded by [BJ] Scooby Gorman PT Student      MMT (Manual Muscle Testing)    General MMT Assessment (P)  no strength deficits identified  -BJ      Recorded by [BJ] Scooby Gorman PT Student      Transfer Assessment/Treatment    Transfers, Sit-Stand [place] (P)  stand by assist   IV and catheter management  -BJ      Transfers, Stand-Sit [place] (P)  stand by assist   IV and catheter management  -BJ      Recorded by [BJ] Scooby Gorman PT Student      Gait Assessment/Treatment    Gait, [place] Level (P)  hand held assist  -BJ      Gait, Distance (Feet) (P)  400  -BJ      Gait, Gait Deviations (P)  decreased heel strike;narrow base;step length decreased;stride length decreased  -BJ      Gait, Safety Issues (P)  step length decreased  -BJ      Gait, Impairments (P)  impaired balance  -BJ      Gait, Comment (P)  He was able to walk 400 ft w/o much difficulty.  -BJ      Recorded by [BJ] Scooby Gorman PT Student      Motor Skills/Interventions    Additional Documentation (P)  Balance Skills Training (Group)  -BJ      Recorded by [BJ] Scooby Gorman PT Student      Balance Skills Training    Standing-Balance Activities (P)  Single Limb Stance   [place], backwards walking  -BJ      Standing Balance # of Minutes (P)  1  -BJ      Gait Balance-Level of Assistance (P)  Contact guard  -BJ      Gait Balance Activities (P)  backwards;braiding / front;grapevine;tandem  -BJ      Gait Balance # of Minutes (P)  2  -BJ      Recorded by [BJ] Scooby Gorman, PT Student      Positioning and Restraints    Pre-Treatment Position (P)  sitting in chair/recliner  -BJ      Post Treatment Position (P)  chair  -BJ      In Chair (P)  reclined;notified nsg;call light within reach;encouraged to call for assist;exit alarm on  -BJ      Recorded by [BJ] Scooby Gorman, PT Student        User Key  (r) = Recorded By, (t) = Taken By, (c) = Cosigned By    Initials Name Effective  Dates    BJ Scooby Gorman, PT Student 01/08/18 -                 IP PT Goals       02/18/18 1434          Bed Mobility PT LTG    Bed Mobility PT LTG, Date Established 02/18/18  -CK      Bed Mobility PT LTG, Time to Achieve 5 days  -CK      Bed Mobility PT LTG, Activity Type all bed mobility  -CK      Bed Mobility PT LTG, [place] Level independent  -CK      Transfer Training PT LTG    Transfer Training PT LTG, Date Established 02/18/18  -CK      Transfer Training PT LTG, Time to Achieve 5 days  -CK      Transfer Training PT LTG, Activity Type bed to chair /chair to bed  -CK      Transfer Training PT LTG, [place] Level independent  -CK      Transfer Training 2 PT LTG    Transfer Training PT 2 LTG, Date Established 02/18/18  -CK      Transfer Training PT 2 LTG, Time to Achieve 5 days  -CK      Transfer Training PT 2 LTG, Activity Type sit to stand/stand to sit  -CK      Transfer Training PT 2 LTG, [place] Level independent  -CK      Gait Training PT LTG    Gait Training Goal PT LTG, Date Established 02/18/18  -CK      Gait Training Goal PT LTG, Time to Achieve 5 days  -CK      Gait Training Goal PT LTG, [place] Level independent  -CK      Gait Training Goal PT LTG, Distance to Achieve 400 feet  -CK      Stair Training PT LTG    Stair Training Goal PT LTG, Date Established 02/18/18  -CK      Stair Training Goal PT LTG, Time to Achieve 5 days  -CK      Stair Training Goal PT LTG, Number of Steps 4  -CK      Stair Training Goal PT LTG, [place] Level independent  -CK      Stair Training Goal PT LTG, Assist Device 1 handrail  -CK        User Key  (r) = Recorded By, (t) = Taken By, (c) = Cosigned By    Initials Name Provider Type    CK Yoni Rodriguez PT Physical Therapist          Physical Therapy Education     Title: PT OT SLP Therapies (Active)     Topic: Physical Therapy (Active)     Point: Mobility training (Done)    Learning Progress Summary    Learner Readiness Method Response Comment  Documented by Status   Patient Acceptance E VU  BJ 02/19/18 1031 Done    Acceptance E VU Benefits of therapy and mobility to prevent muscle atrophy CK 02/18/18 1433 Done               Point: Home exercise program (Done)    Learning Progress Summary    Learner Readiness Method Response Comment Documented by Status   Patient Acceptance E VU  BJ 02/19/18 1031 Done               Point: Precautions (Done)    Learning Progress Summary    Learner Readiness Method Response Comment Documented by Status   Patient Acceptance E VU Benefits of therapy and mobility to prevent muscle atrophy CK 02/18/18 1433 Done                      User Key     Initials Effective Dates Name Provider Type Discipline    CK 04/24/15 -  Yoni Rodriguez, PT Physical Therapist PT    BJ 01/08/18 -  Scooby Gorman, PT Student PT Student PT                    PT Recommendation and Plan  PT Frequency: daily  Plan of Care Review  Outcome Summary/Follow up Plan: (P) Pt much improved this date, ambulating 400 ft HHA. He was able to perform some higher level balance activities, but struggled with grapevine/braiding. Plan is to address stairs next session. PT will continue to see to progress balance and ensure safe return to independence.          Outcome Measures       02/19/18 1034 02/19/18 1003 02/19/18 1000    How much help from another person do you currently need...    Turning from your back to your side while in flat bed without using bedrails? (P)  4  -BJ      Moving from lying on back to sitting on the side of a flat bed without bedrails? (P)  4  -BJ      Moving to and from a bed to a chair (including a wheelchair)? (P)  4  -BJ      Standing up from a chair using your arms (e.g., wheelchair, bedside chair)? (P)  4  -BJ      Climbing 3-5 steps with a railing? (P)  3  -BJ      To walk in hospital room? (P)  4  -BJ      AM-PAC 6 Clicks Score (P)  23  -BJ      How much help from another is currently needed...    Putting on and taking off regular lower  body clothing?  4  -LE     Bathing (including washing, rinsing, and drying)  4  -LE     Toileting (which includes using toilet bed pan or urinal)  4  -LE     Putting on and taking off regular upper body clothing  4  -LE     Taking care of personal grooming (such as brushing teeth)  4  -LE     Eating meals  4  -LE     Score  24  -LE     Functional Assessment    Outcome Measure Options (P)  AM-PAC 6 Clicks Basic Mobility (PT)  -BJ  AM-PAC 6 Clicks Daily Activity (OT)  -LE      02/18/18 1400          How much help from another person do you currently need...    Turning from your back to your side while in flat bed without using bedrails? 4  -CK      Moving from lying on back to sitting on the side of a flat bed without bedrails? 4  -CK      Moving to and from a bed to a chair (including a wheelchair)? 3  -CK      Standing up from a chair using your arms (e.g., wheelchair, bedside chair)? 3  -CK      Climbing 3-5 steps with a railing? 2  -CK      To walk in hospital room? 3  -CK      AM-PAC 6 Clicks Score 19  -CK      Functional Assessment    Outcome Measure Options AM-PAC 6 Clicks Basic Mobility (PT)  -CK        User Key  (r) = Recorded By, (t) = Taken By, (c) = Cosigned By    Initials Name Provider Type    ANAHI Barlow, OTR Occupational Therapist    WILI Rodriguez, PT Physical Therapist    ARVIND Gorman, PT Student PT Student           Time Calculation:         PT Charges       02/19/18 1035          Time Calculation    Start Time (P)  1006  -      Stop Time (P)  1020  -      Time Calculation (min) (P)  14 min  -      PT Received On (P)  02/19/18  -      PT - Next Appointment (P)  02/20/18  -        User Key  (r) = Recorded By, (t) = Taken By, (c) = Cosigned By    Initials Name Provider Type    ARVIND Gorman, PT Student PT Student          Therapy Charges for Today     Code Description Service Date Service Provider Modifiers Qty    [id number]  PT THER PROC EA 15 MIN 2/19/2018 Scooby  Sherif, PT Student GP 1          PT G-Codes  Outcome Measure Options: (P) AM-PAC 6 Clicks Basic Mobility (PT)    Scooby Gorman, PT Student  2/19/2018

## 2018-02-19 NOTE — PLAN OF CARE
Problem: Fall Risk (Adult)  Goal: Absence of Falls  Outcome: Ongoing (interventions implemented as appropriate)      Problem: Stroke (Ischemic) (Adult)  Goal: Signs and Symptoms of Listed Potential Problems Will be Absent or Manageable (Stroke)  Outcome: Ongoing (interventions implemented as appropriate)      Problem: Diabetes, Type 2 (Adult)  Goal: Signs and Symptoms of Listed Potential Problems Will be Absent or Manageable (Diabetes, Type 2)  Outcome: Ongoing (interventions implemented as appropriate)      Problem: Hypertensive Disease/Crisis (Arterial) (Adult)  Goal: Signs and Symptoms of Listed Potential Problems Will be Absent or Manageable (Hypertensive Disease/Crisis)  Outcome: Ongoing (interventions implemented as appropriate)      Problem: Patient Care Overview (Adult)  Goal: Plan of Care Review  Outcome: Ongoing (interventions implemented as appropriate)   02/19/18 0318   Coping/Psychosocial Response Interventions   Plan Of Care Reviewed With patient   Patient Care Overview   Progress improving   Outcome Evaluation   Outcome Summary/Follow up Plan A&Ox4. Behavior is appropriate. NIH remains 0. Patient noncompliant with diabetes and hypertension medications. Educated patient on importance of taking medications as prescribed. Will need ziopatch upon discharge.

## 2018-02-19 NOTE — PLAN OF CARE
Problem: Patient Care Overview (Adult)  Goal: Plan of Care Review  Outcome: Outcome(s) achieved Date Met: 02/19/18 02/19/18 1001   Coping/Psychosocial Response Interventions   Plan Of Care Reviewed With patient   Outcome Evaluation   Outcome Summary/Follow up Plan Pt is performing at baseline for ADL and bathroom mobility. Assist only for IV pole/catheter management. UE function intact. Pt denies ADL concerns for d/c home. Will sign off for OT at this time. Discuss with RN.        Problem: Inpatient Occupational Therapy  Goal: Patient Education Goal LTG- OT  Outcome: Outcome(s) achieved Date Met: 02/19/18 02/19/18 1001   Patient Education OT LTG   Patient Education OT LTG, Date Established 02/19/18   Patient Education OT LTG, Time to Achieve 1 day   Patient Education OT LTG, Education Type home safety   Patient Education OT LTG, Education Understanding verbalizes understanding   Patient Education OT LTG Outcome goal met

## 2018-02-19 NOTE — PLAN OF CARE
Problem: Patient Care Overview (Adult)  Goal: Plan of Care Review   02/19/18 1032   Outcome Evaluation   Outcome Summary/Follow up Plan Pt much improved this date, ambulating 400 ft HHA. He was able to perform some higher level balance activities, but struggled with grapevine/braiding. Plan is to address stairs next session. PT will continue to see to progress balance and ensure safe return to independence.

## 2018-02-19 NOTE — PROGRESS NOTES
"DOS: 2018  NAME: Carlito Mo   : 1955  PCP: García Rehman MD  Chief Complaint   Patient presents with   • Altered Mental Status   • Headache     CC: Stroke    Subjective: No new events overnight per RN. Patient OOB in chair, denies headache or any new stroke/TIA symptoms. He does admit to some feelings of depression, denies SI/HI.     Interval History  History taken from: patient chart RN    Objective:  Vital signs: /86 (BP Location: Right arm, Patient Position: Lying)  Pulse 69  Temp 97.7 °F (36.5 °C) (Oral)   Resp 18  Ht 185.4 cm (72.99\")  Wt 92.3 kg (203 lb 7.8 oz)  SpO2 99%  BMI 26.85 kg/m2      Physical Exam:  GENERAL: NAD  HEENT: Normocephalic, atraumatic   COR: RRR  Resp: Even and unlabored  Extremities: Equal pulses, no signs of distal embolization.     Neurological:   MS: AO. Language normal. No neglect. Higher integrative function normal  CN: II-XII normal  Motor: Normal strength and tone throughout.  Sensory: Intact  Coordination: Normal    Results Review:     I reviewed the patient's new clinical results.    Current Medications:  Scheduled Medications:  amLODIPine 5 mg Oral Q24H   aspirin 81 mg Oral Daily   atorvastatin 80 mg Oral Nightly   clopidogrel 75 mg Oral Daily   insulin aspart 0-14 Units Subcutaneous 4x Daily With Meals & Nightly   insulin detemir 20 Units Subcutaneous Nightly   vitamin B-12 1,000 mcg Oral Daily     Infusions:    PRN Medications:  dextrose  •  dextrose  •  glucagon (human recombinant)  •  magnesium sulfate **OR** magnesium sulfate **OR** magnesium sulfate  •  ondansetron  •  potassium chloride  •  sodium chloride  •  sodium chloride  •  sodium chloride    Medications Reviewed: Changes made    Laboratory results:  Lab Results   Component Value Date    GLUCOSE 67 2018    CALCIUM 8.7 2018     2018    K 3.4 (L) 2018    CO2 26.7 2018    CL 98 2018    BUN 21 2018    CREATININE 1.19 2018    EGFRIFAFRI 74 " 12/12/2017    EGFRIFNONA 62 02/19/2018    BCR 17.6 02/19/2018    ANIONGAP 13.3 02/19/2018     Lab Results   Component Value Date    WBC 9.68 02/19/2018    HGB 11.7 (L) 02/19/2018    HCT 36.2 (L) 02/19/2018    MCV 80.4 02/19/2018     02/19/2018        Results from last 7 days  Lab Units 02/18/18  0503 02/16/18 2020   CHOLESTEROL mg/dL 257* 331*     Lab Results   Component Value Date    INR 1.06 02/19/2018    INR 1.09 02/18/2018    INR 1.02 02/17/2018    PROTIME 13.6 02/19/2018    PROTIME 13.9 02/18/2018    PROTIME 13.2 02/17/2018     Lab Results   Component Value Date    TSH 5.490 (H) 12/18/2017     Lab Results   Component Value Date    HGBA1C 11.60 (H) 02/17/2018     Lab Results   Component Value Date    CHOL 257 (H) 02/18/2018    CHLPL 159 12/18/2017    TRIG 84 02/18/2018    HDL 50 02/18/2018     (H) 02/18/2018     Lab Results   Component Value Date    CKTOTAL 80 11/30/2017    TROPONINT 0.038 (H) 02/17/2018     Lab Results   Component Value Date    YVRJABHC24 388 12/02/2017     Review and interpretation of imaging:  CTA/CTP 2/17/18: IMPRESSION:  1. CT perfusion study is nondiagnostic  2. There is moderate small vessel disease in cerebral white matter, tiny  3 mm old lacunar infarct posterior limb left internal capsule and 8 x 3  mm old lacunar infarct extending from the lateral right thalamus into  the posterior limb of the right internal capsule, tiny old lacunar  infarcts in posterior body of the left caudate nucleus, the left corona  radiata region and tiny old lacunar infarcts of the superior left  frontal parietal subcortical white matter. No convincing acute completed  infarct and no intracranial hemorrhage is seen on this exam  3. There is moderate-to-severe stenosis at the left vertebral origin and  mild/moderate stenosis of the proximal cervical segment of the left  vertebral artery at C6-7 cervical level. Otherwise, the more dominant  left vertebral artery is widely patent to the  vertebrobasilar junction  without additional stenosis. There is a moderate stenosis at the right  vertebral origin and the right vertebral artery is very diseased and  stenotic with moderate stenosis at the C6-7 level that appears slightly  more stenotic at this level than it was on a CT angiogram of the head  and neck 11/30/2017 which may be due to progressive atherosclerotic  disease, conceivably it could be due to a chronic dissection but favor  it is atherosclerotic in origin. The right vertebral artery is patent  beyond this point to the vertebrobasilar junction. The basilar artery is  patent but small in diameter and there is an atretic P1 segment right  posterior cerebral artery and markedly hypoplastic P1 segment left  posterior cerebral artery with persistent fetal origin of the right  posterior cerebral artery off the back wall of the supracavernous  segment of the right internal carotid artery and near persistent fetal  origin of the posterior cerebral artery off the back wall of the  supracavernous left internal carotid artery and thus the vast majority  of the blood flow to the posterior cerebral artery territories is from  the internal carotid arteries and not the basilar artery and this likely  accounts for the small diameter basilar artery.  4. There is mild 10 to 20% stenosis of the origin of the proximal right  internal carotid arteries on the NASCET criteria, otherwise no carotid  stenosis seen on this exam. The remainder of the CT angiogram of the  head and neck is unremarkable. If there remains clinical suspicion of  acute stroke I recommend a MRI of the brain for more complete assessment  and at the time of MRI of the brain an MRA of the neck could be obtained  with thin cut fat-suppressed T1 and T2-weighted images through the  vertebral arteries to further characterize the areas of stenosis to the  proximal cervical vertebral arteries.     MRI brain 2/17/18: IMPRESSION:   Several small foci of  acute infarct may represent embolic phenomenon.  Moderate to prominent periventricular white matter disease suggesting  chronic small vessel ischemic change    EKG 2/17/18: SR, RBBB  TTE 2/17/18: Interpretation Summary   · Calculated EF = 62.9%  · Left ventricular systolic function is normal.  · Left ventricular diastolic dysfunction (grade I) consistent with impaired relaxation.  · Mild mitral valve regurgitation is present  · Saline test results are negative     Impression: Mr. Mo is a 61 yo male with DM (uncontrolled), CAD, CKD, HTN, HLD and h/o recent stroke (Dec. 2017)  who presented on 2/16 with AMS.  I discussed his imaging with Dr. Reddy which shows infarcts in multiple arterial distributions likely cardioembolic. He had recent cardiology work-up during his hospitalization in December including NEISHA that was negative and he was discharged on ASA, Lipitor. The patient reports not being compliant with medications (as evidenced by his LDL of 190 and A1C of 11.6). I discussed at length the importance of medication compliance and aggressive risk factor control for stroke prevention. Plavix has been added to his regimen. Plans are for prolonged cardiac monitoring at discharge. Will also start trial of lexapro for depression, discussed R/B and side-effects. Continue ASA, Plavix and Lipitor. Diabetic educator to see. No further neurologic work up. He has follow-up appointment scheduled in March. Please call with questions or concerns.     Plan:  ZIO patch at D/C  Lexapro 10 mg  Aspirin 81mg  Plavix 75mg  Lipitor 80 mg (, goal < 70)  Hydrate  Neurochecks  Non-pharmacological DVT prophylaxis  EKG Tele  PT/OT/ST  Stroke Education  Blood pressure control to <130/80  Goal LDL <70-recommend high dose statins-   Serum glucose < 140    Call 911 for stroke/TIA symptoms  F/U in stroke clinic in March - appointment already scheduled.     I have discussed the above with Dr. Reddy, RN, and patient.  Alisia Paula,  JI  02/19/18  11:07 AM      Active Problems:    Hypertensive urgency

## 2018-02-20 VITALS
DIASTOLIC BLOOD PRESSURE: 68 MMHG | SYSTOLIC BLOOD PRESSURE: 117 MMHG | WEIGHT: 203.48 LBS | TEMPERATURE: 98.1 F | HEART RATE: 56 BPM | RESPIRATION RATE: 16 BRPM | BODY MASS INDEX: 26.97 KG/M2 | HEIGHT: 73 IN | OXYGEN SATURATION: 98 %

## 2018-02-20 PROBLEM — I63.10 CEREBROVASCULAR ACCIDENT (CVA) DUE TO EMBOLISM OF PRECEREBRAL ARTERY: Status: ACTIVE | Noted: 2018-02-20

## 2018-02-20 LAB
ANION GAP SERPL CALCULATED.3IONS-SCNC: 10.9 MMOL/L
BASOPHILS # BLD AUTO: 0.01 10*3/MM3 (ref 0–0.2)
BASOPHILS NFR BLD AUTO: 0.1 % (ref 0–1.5)
BUN BLD-MCNC: 20 MG/DL (ref 8–23)
BUN/CREAT SERPL: 17.1 (ref 7–25)
CALCIUM SPEC-SCNC: 8.6 MG/DL (ref 8.6–10.5)
CHLORIDE SERPL-SCNC: 101 MMOL/L (ref 98–107)
CO2 SERPL-SCNC: 26.1 MMOL/L (ref 22–29)
CREAT BLD-MCNC: 1.17 MG/DL (ref 0.76–1.27)
DEPRECATED RDW RBC AUTO: 41.4 FL (ref 37–54)
EOSINOPHIL # BLD AUTO: 0.21 10*3/MM3 (ref 0–0.7)
EOSINOPHIL NFR BLD AUTO: 2.3 % (ref 0.3–6.2)
ERYTHROCYTE [DISTWIDTH] IN BLOOD BY AUTOMATED COUNT: 14 % (ref 11.5–14.5)
GFR SERPL CREATININE-BSD FRML MDRD: 63 ML/MIN/1.73
GLUCOSE BLD-MCNC: 55 MG/DL (ref 65–99)
GLUCOSE BLDC GLUCOMTR-MCNC: 231 MG/DL (ref 70–130)
GLUCOSE BLDC GLUCOMTR-MCNC: 58 MG/DL (ref 70–130)
GLUCOSE BLDC GLUCOMTR-MCNC: 83 MG/DL (ref 70–130)
GLUCOSE BLDC GLUCOMTR-MCNC: 98 MG/DL (ref 70–130)
HCT VFR BLD AUTO: 35 % (ref 40.4–52.2)
HGB BLD-MCNC: 11 G/DL (ref 13.7–17.6)
IMM GRANULOCYTES # BLD: 0.04 10*3/MM3 (ref 0–0.03)
IMM GRANULOCYTES NFR BLD: 0.4 % (ref 0–0.5)
INR PPP: 1.08 (ref 0.9–1.1)
LYMPHOCYTES # BLD AUTO: 1.73 10*3/MM3 (ref 0.9–4.8)
LYMPHOCYTES NFR BLD AUTO: 19.2 % (ref 19.6–45.3)
MAGNESIUM SERPL-MCNC: 2.1 MG/DL (ref 1.6–2.4)
MCH RBC QN AUTO: 25.6 PG (ref 27–32.7)
MCHC RBC AUTO-ENTMCNC: 31.4 G/DL (ref 32.6–36.4)
MCV RBC AUTO: 81.4 FL (ref 79.8–96.2)
MONOCYTES # BLD AUTO: 0.66 10*3/MM3 (ref 0.2–1.2)
MONOCYTES NFR BLD AUTO: 7.3 % (ref 5–12)
NEUTROPHILS # BLD AUTO: 6.34 10*3/MM3 (ref 1.9–8.1)
NEUTROPHILS NFR BLD AUTO: 70.7 % (ref 42.7–76)
PLATELET # BLD AUTO: 308 10*3/MM3 (ref 140–500)
PMV BLD AUTO: 11.3 FL (ref 6–12)
POTASSIUM BLD-SCNC: 4 MMOL/L (ref 3.5–5.2)
PROTHROMBIN TIME: 13.8 SECONDS (ref 11.7–14.2)
RBC # BLD AUTO: 4.3 10*6/MM3 (ref 4.6–6)
SODIUM BLD-SCNC: 138 MMOL/L (ref 136–145)
WBC NRBC COR # BLD: 8.99 10*3/MM3 (ref 4.5–10.7)

## 2018-02-20 PROCEDURE — 85025 COMPLETE CBC W/AUTO DIFF WBC: CPT | Performed by: INTERNAL MEDICINE

## 2018-02-20 PROCEDURE — 83735 ASSAY OF MAGNESIUM: CPT | Performed by: INTERNAL MEDICINE

## 2018-02-20 PROCEDURE — 97110 THERAPEUTIC EXERCISES: CPT

## 2018-02-20 PROCEDURE — 36415 COLL VENOUS BLD VENIPUNCTURE: CPT | Performed by: INTERNAL MEDICINE

## 2018-02-20 PROCEDURE — 80048 BASIC METABOLIC PNL TOTAL CA: CPT | Performed by: INTERNAL MEDICINE

## 2018-02-20 PROCEDURE — 63710000001 INSULIN ASPART PER 5 UNITS: Performed by: INTERNAL MEDICINE

## 2018-02-20 PROCEDURE — 82962 GLUCOSE BLOOD TEST: CPT

## 2018-02-20 PROCEDURE — 85610 PROTHROMBIN TIME: CPT | Performed by: INTERNAL MEDICINE

## 2018-02-20 RX ORDER — ASPIRIN 81 MG/1
81 TABLET ORAL DAILY
Qty: 30 TABLET | Refills: 3 | Status: SHIPPED | OUTPATIENT
Start: 2018-02-21 | End: 2018-08-09 | Stop reason: SDUPTHER

## 2018-02-20 RX ORDER — BUPROPION HYDROCHLORIDE 150 MG/1
150 TABLET ORAL DAILY
Qty: 30 TABLET | Refills: 1 | Status: SHIPPED | OUTPATIENT
Start: 2018-02-20 | End: 2018-08-09 | Stop reason: SDUPTHER

## 2018-02-20 RX ORDER — CLOPIDOGREL BISULFATE 75 MG/1
75 TABLET ORAL DAILY
Qty: 30 TABLET | Refills: 3 | Status: SHIPPED | OUTPATIENT
Start: 2018-02-21 | End: 2018-08-09 | Stop reason: SDUPTHER

## 2018-02-20 RX ORDER — LEVOTHYROXINE SODIUM 0.05 MG/1
50 TABLET ORAL DAILY
Status: DISCONTINUED | OUTPATIENT
Start: 2018-02-20 | End: 2018-02-20 | Stop reason: HOSPADM

## 2018-02-20 RX ADMIN — METOPROLOL TARTRATE 100 MG: 100 TABLET, FILM COATED ORAL at 08:02

## 2018-02-20 RX ADMIN — CYANOCOBALAMIN TAB 500 MCG 1000 MCG: 500 TAB at 08:01

## 2018-02-20 RX ADMIN — VALSARTAN 320 MG: 320 TABLET, FILM COATED ORAL at 08:02

## 2018-02-20 RX ADMIN — LEVOTHYROXINE SODIUM 50 MCG: 50 TABLET ORAL at 12:15

## 2018-02-20 RX ADMIN — INSULIN ASPART 5 UNITS: 100 INJECTION, SOLUTION INTRAVENOUS; SUBCUTANEOUS at 12:14

## 2018-02-20 RX ADMIN — AMLODIPINE BESYLATE 5 MG: 5 TABLET ORAL at 08:01

## 2018-02-20 RX ADMIN — ASPIRIN 81 MG: 81 TABLET ORAL at 08:01

## 2018-02-20 RX ADMIN — CLOPIDOGREL 75 MG: 75 TABLET, FILM COATED ORAL at 08:01

## 2018-02-20 RX ADMIN — ESCITALOPRAM 10 MG: 10 TABLET, FILM COATED ORAL at 08:01

## 2018-02-20 NOTE — PLAN OF CARE
Problem: Patient Care Overview (Adult)  Goal: Plan of Care Review   02/20/18 0933   Outcome Evaluation   Outcome Summary/Follow up Plan Pt able to climb 13 total stairs this date, 3 w/o railing. He was improved on his higher level balance ex, requiring little to no UE to support himself. He is at or near baseline. PT will d/c as he has no further acute PT needs. Pt in agreement.

## 2018-02-20 NOTE — DISCHARGE SUMMARY
DISCHARGE SUMMARY    Patient Name: Carlito Mo  Age/Sex: 62 y.o. male  : 1955  MRN: [id number]  Patient Care Team:  García Rehman MD as PCP - General (Family Medicine)  García Rehman MD as PCP - Family Medicine       Date of Admit: 2018  Date of Discharge:  18  Discharge Condition: Good    Discharge Diagnoses:  1. Acute CVA with history of recent stroke back in 2017  2. Hypertensive emergency  3. Coronary artery disease  4. Chronic kidney disease  5. Hypertension  6. Hyperlipidemia  7. Uncontrolled diabetes mellitus    History of present illness from H&P from 2018:   Mr. Mo is a 62-year-old prior admission for TIA hypertensive emergency in December with severe diabetes and hypertension who is noncompliant with his medications.  He is brought into the emergency room by EMS secondary to family concerns of altered mental status.  Patient's wife provides much of the history as the patient is completely aphasic.  She does relate that the patient was alert and driving her son to different errands today.  Son reports that the patient was last normal at 4:00 upon interview with the wife.  There is some discrepancy regarding the timing of this is the wife reports that the son is autistic and that accurate history is difficult to obtain from him.  Wife return back to the house around 6:00 this evening and found him to be altered.     In the immediate emergency room his blood pressure is noted to have a systolic blood pressure over 220.  CT scan showed no acute abnormality.  Stroke team was contacted and patient was deemed not to be a TPA candidate.  He was started on Cardene drip and will be admitted to the ICU frequent neurochecks.     Undetected interview the patient will not interact with interviewer.  He will not follow any directions.  He intermittently is dry heaving and apparently is spitting at staff in the emergency room.  He is moving all extremities and his extraocular movement  is intact.    Hospital Course:   Carlito Mo presented to [facility] with complaints of altered mental status on 2/16/18 and was admitted with an acute stroke. CTA of the head and neck showed old lacunar infarcts, moderate to severe stenosis of left vertebral artery, moderate stenosis of right vertebral artery and no sign of acute infarct. MRI on 2/17/18 showed several small foci of acute infarcts suggestive of embolic. He had an echocardiogram on 2/17/18 that showed a normal EF with grade 1 diastolic dysfunction, mild mitral valve regurgitation and negative saline test.  Patient had had a NEISHA back in December that was negative.  Patient has been noncompliant with medications at home and was noted to have an elevated LDL of 190 and A1c of 11.6.  Plavix was added to his home regimen.  He did have a Holter monitor after his December admission that did show sinus rhythm with some PACs and PVCs, but no sign of atrial fibrillation.  He will be discharged on a Zio patch for 2 weeks of cardiac monitoring.  He will continue on his home aspirin and Lipitor as previously prescribed in December.  He did have complaints of worsening depression and was started on Lexapro and will discontinue his home Wellbutrin.  His home metoprolol and valsartan were continued.  He did have an elevated troponin that was likely secondary to hypertensive urgency and denied any anginal symptoms.  He was evaluated by speech therapy and felt to have functional swallowing and no aspiration.    He will follow-up in the stroke clinic in March as previously scheduled.  He will follow-up with Matilda Velez to discuss further management of diabetes.  Follow-up with cardiology for blood pressure management and follow-up on Zio patch.  He was working with physical therapy and was doing well and able to climb stairs and has no further acute PT or OT needs.    Consults:   IP CONSULT TO NEUROLOGY  IP CONSULT TO NEUROLOGY  IP CONSULT TO  PULMONOLOGY  IP CONSULT TO NEUROLOGY  IP CONSULT TO CASE MANAGEMENT   IP CONSULT TO NEURO CLINICAL SPECIALIST  IP CONSULT TO REHAB ADMISSION  IP CONSULT TO CASE MANAGEMENT   IP CONSULT TO DIABETES EDUCATOR  IP CONSULT TO CARDIOLOGY    Significant Discharge Diagnostics   Procedures Performed:         Pertinent Lab Results:    Results from last 7 days  Lab Units 02/20/18  0319 02/19/18  1140 02/19/18  0332 02/18/18  0503 02/17/18  0544 02/17/18  0149 02/16/18 2025 02/16/18 2020   SODIUM mmol/L 138  --  138 140 136 136  --  131*   POTASSIUM mmol/L 4.0 4.4 3.4* 3.9 3.7 3.8  --  4.4   CHLORIDE mmol/L 101  --  98 101 96* 96*  --  91*   CO2 mmol/L 26.1  --  26.7 28.2 29.3* 25.2  --  24.6   BUN mg/dL 20  --  21 21 21 20  --  20   CREATININE mg/dL 1.17  --  1.19 1.44* 1.54* 1.27 1.30 1.38*   GLUCOSE mg/dL 55*  --  67 122* 228* 451*  --  488*   CALCIUM mg/dL 8.6  --  8.7 8.4* 9.0 9.1  --  9.7   AST (SGOT) U/L  --   --   --   --   --   --   --  15   ALT (SGPT) U/L  --   --   --   --   --   --   --  14       Results from last 7 days  Lab Units 02/17/18  1614 02/17/18  0544 02/17/18 0149 02/16/18 2020   TROPONIN T ng/mL 0.038* 0.034* 0.022 0.023       Results from last 7 days  Lab Units 02/20/18  0319 02/19/18  0332 02/18/18  0503 02/17/18  0544 02/17/18 0149 02/16/18 2019   WBC 10*3/mm3 8.99 9.68 9.43 13.80* 16.12* 6.61   HEMOGLOBIN g/dL 11.0* 11.7* 10.7* 11.6* 11.3* 12.3*   HEMATOCRIT % 35.0* 36.2* 34.2* 36.2* 34.5* 37.5*   PLATELETS 10*3/mm3 308 273 223 265 272 262   MCV fL 81.4 80.4 81.8 79.4* 79.1* 78.9*   MCH pg 25.6* 26.0* 25.6* 25.4* 25.9* 25.9*   MCHC g/dL 31.4* 32.3* 31.3* 32.0* 32.8 32.8   RDW % 14.0 13.8 14.3 14.0 13.8 13.8   RDW-SD fl 41.4 40.6 42.8 40.2 38.9 39.3   MPV fL 11.3 10.6 10.8 10.6 10.9 10.9   NEUTROPHIL % % 70.7 66.6 70.3 90.1* 91.6* 70.0   LYMPHOCYTE % % 19.2* 24.0 21.8 6.9* 5.3* 22.5   MONOCYTES % % 7.3 7.2 6.7 2.9* 2.9* 5.3   EOSINOPHIL % % 2.3 1.9 0.8 0.0* 0.0* 1.7    BASOPHIL % % 0.1 0.1 0.1 0.0 0.0 0.2   IMM GRAN % % 0.4 0.2 0.3 0.1 0.2 0.3   NEUTROS ABS 10*3/mm3 6.34 6.45 6.62 12.43* 14.75* 4.63   LYMPHS ABS 10*3/mm3 1.73 2.32 2.06 0.95 0.86* 1.49   MONOS ABS 10*3/mm3 0.66 0.70 0.63 0.40 0.47 0.35   EOS ABS 10*3/mm3 0.21 0.18 0.08 0.00 0.00 0.11   BASOS ABS 10*3/mm3 0.01 0.01 0.01 0.00 0.00 0.01   IMMATURE GRANS (ABS) 10*3/mm3 0.04* 0.02 0.03 0.02 0.04* 0.02       Results from last 7 days  Lab Units 02/20/18  0319 02/19/18  0332 02/18/18  0503 02/17/18  0544 02/17/18  0149 02/16/18 2020   INR  1.08 1.06 1.09 1.02 1.04 0.98   APTT seconds  --   --   --   --   --  22.8       Results from last 7 days  Lab Units 02/20/18  0319 02/19/18  0332 02/18/18  0503 02/17/18  0149 02/16/18 2020   MAGNESIUM mg/dL 2.1 1.9 2.2 1.4* 1.6       Results from last 7 days  Lab Units 02/18/18  0503 02/16/18 2020   CHOLESTEROL mg/dL 257* 331*   TRIGLYCERIDES mg/dL 84 416*   HDL CHOL mg/dL 50 47                   Results from last 7 days  Lab Units 02/16/18 2020   PROCALCITONIN ng/mL 0.04*               Results from last 7 days  Lab Units 02/16/18 2313   URINECX  No growth       Results from last 7 days  Lab Units 02/16/18 2313   NITRITE UA  Negative   WBC UA /HPF 0-2   BACTERIA UA /HPF None Seen   SQUAM EPITHEL UA /HPF None Seen   URINECX  No growth               Imaging Results:  Imaging Results (all)     Procedure Component Value Units Date/Time    MRI Brain Without Contrast [[id number]] Collected:  02/17/18 1132     Updated:  02/17/18 1146    Narrative:       MRI BRAIN WO CONTRAST-     INDICATIONS: Stroke     TECHNIQUE: Multiplanar multisequence noncontrast magnetic resonance  imaging of the brain.     COMPARISON: 12/02/2017, correlated with CT from 02/16/2018     FINDINGS:     The diffusion-weighted images show several small foci of acute infarct  may represent embolic process. For example, left corona radiata, 6 mm on  image 50, right corona radiata, 5 mm on image 54, left malcolm, 7 mm  image  38.     The midline structures appear unremarkable.     The brain parenchyma shows moderate to prominent T2 FLAIR signal  hyperintensities in the periventricular white matter suggesting chronic  small vessel ischemic change in a patient this age.     Flow voids in the major arteries at the base of the brain appear  unremarkable.     Likely mucous retention cyst in the right maxillary sinus. The paranasal  sinuses, mastoid air cells, and orbits appear otherwise unremarkable.  Several subcutaneous densities of the scalp, could for example be  sebaceous cyst, but nonspecific, correlate with clinical exam.             Impression:           Several small foci of acute infarct may represent embolic phenomenon.  Moderate to prominent periventricular white matter disease suggesting  chronic small vessel ischemic change.     Discussed by telephone with patient's nurse, Jonnathan, at time of  interpretation, 1143, 02/17/2018.     This report was finalized on 2/17/2018 11:43 AM by Dr. Drake Dennis MD.       CT Angiogram Neck With & Without Contrast [[id number]] Collected:  02/16/18 2319     Updated:  02/17/18 1209    Narrative:       STAT CONTRAST-ENHANCED CT ANGIOGRAM OF THE HEAD AND NECK AND CT  PERFUSION STUDY OF THE HEAD 02/16/2018     CLINICAL HISTORY: Acute stroke symptoms team D.      TECHNIQUE: Initially spiral CT images were obtained from the base of the  skull to the vertex without intravenous contrast and images were  reformatted and submitted in 3 mm thick axial CT section with brain  algorithm. There is moderate small vessel disease in the cerebral white  matter, old lacunar infarct in the body of the left caudate nucleus,  posterior limb of the left internal capsule, lateral right thalamus,  left corona radiata region and superior frontoparietal subcortical white  matter but no obvious acute infarct and no intracranial hemorrhage seen  on the noncontrast head CT and the results of the study  were  communicated to Dr. Reddy by telephone 02/16/2018 at 8:20 PM and he  requested a CT angiogram of the head and neck and CT perfusion study of  the head and subsequent spiral CT images were obtained through the head  during the arterial phase of contrast and images were reformatted and  analyzed with rapid software analysis for CT perfusion study of the head  and subsequent spiral CT images were obtained from the top of the aortic  arch up through the great vessels of the head and neck during the  arterial phase of contrast and images were reformatted and submitted in  1 mm thick axial CT section, 1 mm thick sagittal and coronal  reconstructions and 3-D reconstructions were performed to complete the  CT angios of the head and neck and finally spiral CT images were  obtained from the base of the skull to the vertex delayed following  intravenous contrast and images were reformatted and submitted in 3 mm  thick axial CT section with brain algorithm.     COMPARISON: This is correlated to prior CT angiogram of the head and  neck from [facility] on 11/30/2017, prior MRI of the  head 12/02/2017.     FINDINGS: CT HEAD: There is some patchy low-density in the  periventricular extending into the subcortical white matter of the  cerebral hemispheres consistent with moderate small vessel disease.  There is a 3 mm old lacunar infarct extending from the lateral left  thalamus into the posterior limb of the left internal capsule and there  is a 8 x 3 mm old lacunar infarct involving the lateral right thalamus  extending into the posterior limb of the right internal capsule. There  is an old lacunar infarct in the posterior body of the left caudate  nucleus measuring up to 6 mm in size, small old superior left frontal  and parietal subcortical white matter infarcts. The ventricles are  normal in size. I see no mass effect and no midline shift. No  extra-axial fluid collections are identified and there is no  evidence of  acute intracranial hemorrhage. No abnormal areas of enhancement are seen  in the head. There is a 1.5 cm mucus retention cyst in the posterior  inferior right maxillary sinus, otherwise the paranasal sinuses, mastoid  air cells and middle ear cavities are clear.      CT PERFUSION STUDY: The CT perfusion study is nondiagnostic     CT ANGIOGRAM OF THE HEAD AND NECK: The nasopharyngeal and oropharyngeal  airway is collapsed limiting evaluation. The hypopharynx is  unremarkable. The true cords and subglottic airway are unremarkable. The  thyroid gland is within normal limits. The lung apices are clear. The  parotid,  parapharyngeal and submandibular spaces are  symmetric and are normal in appearance. There is disc space narrowing,  degenerative endplate changes at C5-6, posterior spurring, mild canal  and moderate right foraminal narrowing at C5-6. There is mild canal and  mild-to-moderate left foraminal narrowing at C6-7.     CT angiogram images through the neck demonstrate scattered calcified  plaques in the aortic arch. There is anatomic origin of the great  vessels off the aortic arch. The left subclavian artery origin is normal  in appearance. There is an atherosclerotic plaque mildly narrowing the  mid to distal left subclavian artery. There is atherosclerotic plaque  that appears to result in severe stenosis at the left vertebral artery  origin. There are areas of mild-to-moderate stenosis in the proximal  left vertebral artery at the C6-7 level. The remainder of the left  vertebral artery is widely patent to the vertebrobasilar junction. The  left common carotid origin is normal in appearance. No stenosis seen in  the left common carotid artery. There is mixed calcified and  noncalcified plaque involving the posterior lateral wall of the left  common carotid bifurcation extending into the origin of the left  internal carotid artery but there is essentially no stenosis in the left  internal  carotid artery using the NASCET criteria. The brachycephalic  artery origin is normal in appearance and no stenosis seen in the  brachycephalic artery, its bifurcation in the right subclavian, the  common carotid artery is normal in appearance and no stenosis seen in  the right subclavian artery. There is a focal mild/moderate stenosis of  the right vertebral origin. There is multifocal up to moderate stenosis  of the proximal right vertebral artery most pronounced at the C6-7  cervical level that appears slightly more prominent and may be on the  basis of atherosclerotic disease, less likely chronic dissection. The  upper cervical right vertebral artery is within normal limits. There is  a small size intracranial segment right vertebral artery that is patent  to the vertebrobasilar junction. The right common carotid origin is  normal in appearance and no stenosis seen in the right common carotid  artery. There is calcified plaque involving the posterior aspect of the  common carotid bifurcation extending into the medial posterior and  lateral right internal carotid origin but resulting in a mild only 20%  stenosis of the proximal right internal carotid arteries using the  NASCET criteria. The remainder of the right internal carotid artery is  normal in appearance.      CT angiogram images through the head demonstrate small diameter  intracranial segment distal right vertebral artery that is patent to the  vertebrobasilar junction. The larger diameter left vertebral artery is  also patent without stenosis to the vertebrobasilar junction. The  basilar artery and basilar tip is patent. There are hypoplastic P1  segments of the posterior cerebral arteries bilaterally with dominant  bilateral posterior communicating arteries that supply the bulk of the  blood flow to the posterior cerebral artery territories. The upper  cervical petrous segments of the internal carotid arteries are normal in  appearance. Calcified  plaques mildly narrow the cavernous internal  carotids. The internal carotid termini are normal in appearance. The  anterior and middle cerebral arteries are normal in appearance. The  anterior communicating artery origin and middle cerebral artery  trifurcation and the posterior communicating artery origins are normal  in appearance.       Impression:          1. CT perfusion study is nondiagnostic     2. There is moderate small vessel disease in cerebral white matter, tiny  3 mm old lacunar infarct posterior limb left internal capsule and 8 x 3  mm old lacunar infarct extending from the lateral right thalamus into  the posterior limb of the right internal capsule, tiny old lacunar  infarcts in posterior body of the left caudate nucleus, the left corona  radiata region and tiny old lacunar infarcts of the superior left  frontal parietal subcortical white matter. No convincing acute completed  infarct and no intracranial hemorrhage is seen on this exam     3. There is moderate-to-severe stenosis at the left vertebral origin and  mild/moderate stenosis of the proximal cervical segment of the left  vertebral artery at C6-7 cervical level. Otherwise, the more dominant  left vertebral artery is widely patent to the vertebrobasilar junction  without additional stenosis. There is a moderate stenosis at the right  vertebral origin and the right vertebral artery is very diseased and  stenotic with moderate stenosis at the C6-7 level that appears slightly  more stenotic at this level than it was on a CT angiogram of the head  and neck 11/30/2017 which may be due to progressive atherosclerotic  disease, conceivably it could be due to a chronic dissection but favor  it is atherosclerotic in origin. The right vertebral artery is patent  beyond this point to the vertebrobasilar junction. The basilar artery is  patent but small in diameter and there is an atretic P1 segment right  posterior cerebral artery and markedly  hypoplastic P1 segment left  posterior cerebral artery with persistent fetal origin of the right  posterior cerebral artery off the back wall of the supracavernous  segment of the right internal carotid artery and near persistent fetal  origin of the posterior cerebral artery off the back wall of the  supracavernous left internal carotid artery and thus the vast majority  of the blood flow to the posterior cerebral artery territories is from  the internal carotid arteries and not the basilar artery and this likely  accounts for the small diameter basilar artery.      4. There is mild 10 to 20% stenosis of the origin of the proximal right  internal carotid arteries on the NASCET criteria, otherwise no carotid  stenosis seen on this exam. The remainder of the CT angiogram of the  head and neck is unremarkable. If there remains clinical suspicion of  acute stroke I recommend a MRI of the brain for more complete assessment  and at the time of MRI of the brain an MRA of the neck could be obtained  with thin cut fat-suppressed T1 and T2-weighted images through the  vertebral arteries to further characterize the areas of stenosis to the  proximal cervical vertebral arteries. The results and recommendations  from this study were communicated to Dr. Reddy by telephone on  02/16/2018 at 9 PM.     Radiation dose reduction techniques were utilized, including automated  exposure control and exposure modulation based on body size.     This report was finalized on 2/17/2018 12:06 PM by Dr. Alexey Aldana MD.       CT Cerebral Perfusion With & Without Contrast [[id number]] Collected:  02/16/18 2319     Updated:  02/17/18 1209    Narrative:       STAT CONTRAST-ENHANCED CT ANGIOGRAM OF THE HEAD AND NECK AND CT  PERFUSION STUDY OF THE HEAD 02/16/2018     CLINICAL HISTORY: Acute stroke symptoms team D.      TECHNIQUE: Initially spiral CT images were obtained from the base of the  skull to the vertex without intravenous contrast and images  were  reformatted and submitted in 3 mm thick axial CT section with brain  algorithm. There is moderate small vessel disease in the cerebral white  matter, old lacunar infarct in the body of the left caudate nucleus,  posterior limb of the left internal capsule, lateral right thalamus,  left corona radiata region and superior frontoparietal subcortical white  matter but no obvious acute infarct and no intracranial hemorrhage seen  on the noncontrast head CT and the results of the study were  communicated to Dr. Reddy by telephone 02/16/2018 at 8:20 PM and he  requested a CT angiogram of the head and neck and CT perfusion study of  the head and subsequent spiral CT images were obtained through the head  during the arterial phase of contrast and images were reformatted and  analyzed with rapid software analysis for CT perfusion study of the head  and subsequent spiral CT images were obtained from the top of the aortic  arch up through the great vessels of the head and neck during the  arterial phase of contrast and images were reformatted and submitted in  1 mm thick axial CT section, 1 mm thick sagittal and coronal  reconstructions and 3-D reconstructions were performed to complete the  CT angios of the head and neck and finally spiral CT images were  obtained from the base of the skull to the vertex delayed following  intravenous contrast and images were reformatted and submitted in 3 mm  thick axial CT section with brain algorithm.     COMPARISON: This is correlated to prior CT angiogram of the head and  neck from [facility] on 11/30/2017, prior MRI of the  head 12/02/2017.     FINDINGS: CT HEAD: There is some patchy low-density in the  periventricular extending into the subcortical white matter of the  cerebral hemispheres consistent with moderate small vessel disease.  There is a 3 mm old lacunar infarct extending from the lateral left  thalamus into the posterior limb of the left internal  capsule and there  is a 8 x 3 mm old lacunar infarct involving the lateral right thalamus  extending into the posterior limb of the right internal capsule. There  is an old lacunar infarct in the posterior body of the left caudate  nucleus measuring up to 6 mm in size, small old superior left frontal  and parietal subcortical white matter infarcts. The ventricles are  normal in size. I see no mass effect and no midline shift. No  extra-axial fluid collections are identified and there is no evidence of  acute intracranial hemorrhage. No abnormal areas of enhancement are seen  in the head. There is a 1.5 cm mucus retention cyst in the posterior  inferior right maxillary sinus, otherwise the paranasal sinuses, mastoid  air cells and middle ear cavities are clear.      CT PERFUSION STUDY: The CT perfusion study is nondiagnostic     CT ANGIOGRAM OF THE HEAD AND NECK: The nasopharyngeal and oropharyngeal  airway is collapsed limiting evaluation. The hypopharynx is  unremarkable. The true cords and subglottic airway are unremarkable. The  thyroid gland is within normal limits. The lung apices are clear. The  parotid,  parapharyngeal and submandibular spaces are  symmetric and are normal in appearance. There is disc space narrowing,  degenerative endplate changes at C5-6, posterior spurring, mild canal  and moderate right foraminal narrowing at C5-6. There is mild canal and  mild-to-moderate left foraminal narrowing at C6-7.     CT angiogram images through the neck demonstrate scattered calcified  plaques in the aortic arch. There is anatomic origin of the great  vessels off the aortic arch. The left subclavian artery origin is normal  in appearance. There is an atherosclerotic plaque mildly narrowing the  mid to distal left subclavian artery. There is atherosclerotic plaque  that appears to result in severe stenosis at the left vertebral artery  origin. There are areas of mild-to-moderate stenosis in the  proximal  left vertebral artery at the C6-7 level. The remainder of the left  vertebral artery is widely patent to the vertebrobasilar junction. The  left common carotid origin is normal in appearance. No stenosis seen in  the left common carotid artery. There is mixed calcified and  noncalcified plaque involving the posterior lateral wall of the left  common carotid bifurcation extending into the origin of the left  internal carotid artery but there is essentially no stenosis in the left  internal carotid artery using the NASCET criteria. The brachycephalic  artery origin is normal in appearance and no stenosis seen in the  brachycephalic artery, its bifurcation in the right subclavian, the  common carotid artery is normal in appearance and no stenosis seen in  the right subclavian artery. There is a focal mild/moderate stenosis of  the right vertebral origin. There is multifocal up to moderate stenosis  of the proximal right vertebral artery most pronounced at the C6-7  cervical level that appears slightly more prominent and may be on the  basis of atherosclerotic disease, less likely chronic dissection. The  upper cervical right vertebral artery is within normal limits. There is  a small size intracranial segment right vertebral artery that is patent  to the vertebrobasilar junction. The right common carotid origin is  normal in appearance and no stenosis seen in the right common carotid  artery. There is calcified plaque involving the posterior aspect of the  common carotid bifurcation extending into the medial posterior and  lateral right internal carotid origin but resulting in a mild only 20%  stenosis of the proximal right internal carotid arteries using the  NASCET criteria. The remainder of the right internal carotid artery is  normal in appearance.      CT angiogram images through the head demonstrate small diameter  intracranial segment distal right vertebral artery that is patent to the  vertebrobasilar  junction. The larger diameter left vertebral artery is  also patent without stenosis to the vertebrobasilar junction. The  basilar artery and basilar tip is patent. There are hypoplastic P1  segments of the posterior cerebral arteries bilaterally with dominant  bilateral posterior communicating arteries that supply the bulk of the  blood flow to the posterior cerebral artery territories. The upper  cervical petrous segments of the internal carotid arteries are normal in  appearance. Calcified plaques mildly narrow the cavernous internal  carotids. The internal carotid termini are normal in appearance. The  anterior and middle cerebral arteries are normal in appearance. The  anterior communicating artery origin and middle cerebral artery  trifurcation and the posterior communicating artery origins are normal  in appearance.       Impression:          1. CT perfusion study is nondiagnostic     2. There is moderate small vessel disease in cerebral white matter, tiny  3 mm old lacunar infarct posterior limb left internal capsule and 8 x 3  mm old lacunar infarct extending from the lateral right thalamus into  the posterior limb of the right internal capsule, tiny old lacunar  infarcts in posterior body of the left caudate nucleus, the left corona  radiata region and tiny old lacunar infarcts of the superior left  frontal parietal subcortical white matter. No convincing acute completed  infarct and no intracranial hemorrhage is seen on this exam     3. There is moderate-to-severe stenosis at the left vertebral origin and  mild/moderate stenosis of the proximal cervical segment of the left  vertebral artery at C6-7 cervical level. Otherwise, the more dominant  left vertebral artery is widely patent to the vertebrobasilar junction  without additional stenosis. There is a moderate stenosis at the right  vertebral origin and the right vertebral artery is very diseased and  stenotic with moderate stenosis at the C6-7 level  that appears slightly  more stenotic at this level than it was on a CT angiogram of the head  and neck 11/30/2017 which may be due to progressive atherosclerotic  disease, conceivably it could be due to a chronic dissection but favor  it is atherosclerotic in origin. The right vertebral artery is patent  beyond this point to the vertebrobasilar junction. The basilar artery is  patent but small in diameter and there is an atretic P1 segment right  posterior cerebral artery and markedly hypoplastic P1 segment left  posterior cerebral artery with persistent fetal origin of the right  posterior cerebral artery off the back wall of the supracavernous  segment of the right internal carotid artery and near persistent fetal  origin of the posterior cerebral artery off the back wall of the  supracavernous left internal carotid artery and thus the vast majority  of the blood flow to the posterior cerebral artery territories is from  the internal carotid arteries and not the basilar artery and this likely  accounts for the small diameter basilar artery.      4. There is mild 10 to 20% stenosis of the origin of the proximal right  internal carotid arteries on the NASCET criteria, otherwise no carotid  stenosis seen on this exam. The remainder of the CT angiogram of the  head and neck is unremarkable. If there remains clinical suspicion of  acute stroke I recommend a MRI of the brain for more complete assessment  and at the time of MRI of the brain an MRA of the neck could be obtained  with thin cut fat-suppressed T1 and T2-weighted images through the  vertebral arteries to further characterize the areas of stenosis to the  proximal cervical vertebral arteries. The results and recommendations  from this study were communicated to Dr. Reddy by telephone on  02/16/2018 at 9 PM.     Radiation dose reduction techniques were utilized, including automated  exposure control and exposure modulation based on body size.     This report  was finalized on 2/17/2018 12:06 PM by Dr. Alexey Aldana MD.       CT Angiogram Head With & Without Contrast [[id number]] Collected:  02/16/18 2319     Updated:  02/17/18 1209    Narrative:       STAT CONTRAST-ENHANCED CT ANGIOGRAM OF THE HEAD AND NECK AND CT  PERFUSION STUDY OF THE HEAD 02/16/2018     CLINICAL HISTORY: Acute stroke symptoms team DBreonna      TECHNIQUE: Initially spiral CT images were obtained from the base of the  skull to the vertex without intravenous contrast and images were  reformatted and submitted in 3 mm thick axial CT section with brain  algorithm. There is moderate small vessel disease in the cerebral white  matter, old lacunar infarct in the body of the left caudate nucleus,  posterior limb of the left internal capsule, lateral right thalamus,  left corona radiata region and superior frontoparietal subcortical white  matter but no obvious acute infarct and no intracranial hemorrhage seen  on the noncontrast head CT and the results of the study were  communicated to Dr. Reddy by telephone 02/16/2018 at 8:20 PM and he  requested a CT angiogram of the head and neck and CT perfusion study of  the head and subsequent spiral CT images were obtained through the head  during the arterial phase of contrast and images were reformatted and  analyzed with rapid software analysis for CT perfusion study of the head  and subsequent spiral CT images were obtained from the top of the aortic  arch up through the great vessels of the head and neck during the  arterial phase of contrast and images were reformatted and submitted in  1 mm thick axial CT section, 1 mm thick sagittal and coronal  reconstructions and 3-D reconstructions were performed to complete the  CT angios of the head and neck and finally spiral CT images were  obtained from the base of the skull to the vertex delayed following  intravenous contrast and images were reformatted and submitted in 3 mm  thick axial CT section with brain algorithm.      COMPARISON: This is correlated to prior CT angiogram of the head and  neck from [facility] on 11/30/2017, prior MRI of the  head 12/02/2017.     FINDINGS: CT HEAD: There is some patchy low-density in the  periventricular extending into the subcortical white matter of the  cerebral hemispheres consistent with moderate small vessel disease.  There is a 3 mm old lacunar infarct extending from the lateral left  thalamus into the posterior limb of the left internal capsule and there  is a 8 x 3 mm old lacunar infarct involving the lateral right thalamus  extending into the posterior limb of the right internal capsule. There  is an old lacunar infarct in the posterior body of the left caudate  nucleus measuring up to 6 mm in size, small old superior left frontal  and parietal subcortical white matter infarcts. The ventricles are  normal in size. I see no mass effect and no midline shift. No  extra-axial fluid collections are identified and there is no evidence of  acute intracranial hemorrhage. No abnormal areas of enhancement are seen  in the head. There is a 1.5 cm mucus retention cyst in the posterior  inferior right maxillary sinus, otherwise the paranasal sinuses, mastoid  air cells and middle ear cavities are clear.      CT PERFUSION STUDY: The CT perfusion study is nondiagnostic     CT ANGIOGRAM OF THE HEAD AND NECK: The nasopharyngeal and oropharyngeal  airway is collapsed limiting evaluation. The hypopharynx is  unremarkable. The true cords and subglottic airway are unremarkable. The  thyroid gland is within normal limits. The lung apices are clear. The  parotid,  parapharyngeal and submandibular spaces are  symmetric and are normal in appearance. There is disc space narrowing,  degenerative endplate changes at C5-6, posterior spurring, mild canal  and moderate right foraminal narrowing at C5-6. There is mild canal and  mild-to-moderate left foraminal narrowing at C6-7.     CT  angiogram images through the neck demonstrate scattered calcified  plaques in the aortic arch. There is anatomic origin of the great  vessels off the aortic arch. The left subclavian artery origin is normal  in appearance. There is an atherosclerotic plaque mildly narrowing the  mid to distal left subclavian artery. There is atherosclerotic plaque  that appears to result in severe stenosis at the left vertebral artery  origin. There are areas of mild-to-moderate stenosis in the proximal  left vertebral artery at the C6-7 level. The remainder of the left  vertebral artery is widely patent to the vertebrobasilar junction. The  left common carotid origin is normal in appearance. No stenosis seen in  the left common carotid artery. There is mixed calcified and  noncalcified plaque involving the posterior lateral wall of the left  common carotid bifurcation extending into the origin of the left  internal carotid artery but there is essentially no stenosis in the left  internal carotid artery using the NASCET criteria. The brachycephalic  artery origin is normal in appearance and no stenosis seen in the  brachycephalic artery, its bifurcation in the right subclavian, the  common carotid artery is normal in appearance and no stenosis seen in  the right subclavian artery. There is a focal mild/moderate stenosis of  the right vertebral origin. There is multifocal up to moderate stenosis  of the proximal right vertebral artery most pronounced at the C6-7  cervical level that appears slightly more prominent and may be on the  basis of atherosclerotic disease, less likely chronic dissection. The  upper cervical right vertebral artery is within normal limits. There is  a small size intracranial segment right vertebral artery that is patent  to the vertebrobasilar junction. The right common carotid origin is  normal in appearance and no stenosis seen in the right common carotid  artery. There is calcified plaque involving the  posterior aspect of the  common carotid bifurcation extending into the medial posterior and  lateral right internal carotid origin but resulting in a mild only 20%  stenosis of the proximal right internal carotid arteries using the  NASCET criteria. The remainder of the right internal carotid artery is  normal in appearance.      CT angiogram images through the head demonstrate small diameter  intracranial segment distal right vertebral artery that is patent to the  vertebrobasilar junction. The larger diameter left vertebral artery is  also patent without stenosis to the vertebrobasilar junction. The  basilar artery and basilar tip is patent. There are hypoplastic P1  segments of the posterior cerebral arteries bilaterally with dominant  bilateral posterior communicating arteries that supply the bulk of the  blood flow to the posterior cerebral artery territories. The upper  cervical petrous segments of the internal carotid arteries are normal in  appearance. Calcified plaques mildly narrow the cavernous internal  carotids. The internal carotid termini are normal in appearance. The  anterior and middle cerebral arteries are normal in appearance. The  anterior communicating artery origin and middle cerebral artery  trifurcation and the posterior communicating artery origins are normal  in appearance.       Impression:          1. CT perfusion study is nondiagnostic     2. There is moderate small vessel disease in cerebral white matter, tiny  3 mm old lacunar infarct posterior limb left internal capsule and 8 x 3  mm old lacunar infarct extending from the lateral right thalamus into  the posterior limb of the right internal capsule, tiny old lacunar  infarcts in posterior body of the left caudate nucleus, the left corona  radiata region and tiny old lacunar infarcts of the superior left  frontal parietal subcortical white matter. No convincing acute completed  infarct and no intracranial hemorrhage is seen on this  exam     3. There is moderate-to-severe stenosis at the left vertebral origin and  mild/moderate stenosis of the proximal cervical segment of the left  vertebral artery at C6-7 cervical level. Otherwise, the more dominant  left vertebral artery is widely patent to the vertebrobasilar junction  without additional stenosis. There is a moderate stenosis at the right  vertebral origin and the right vertebral artery is very diseased and  stenotic with moderate stenosis at the C6-7 level that appears slightly  more stenotic at this level than it was on a CT angiogram of the head  and neck 11/30/2017 which may be due to progressive atherosclerotic  disease, conceivably it could be due to a chronic dissection but favor  it is atherosclerotic in origin. The right vertebral artery is patent  beyond this point to the vertebrobasilar junction. The basilar artery is  patent but small in diameter and there is an atretic P1 segment right  posterior cerebral artery and markedly hypoplastic P1 segment left  posterior cerebral artery with persistent fetal origin of the right  posterior cerebral artery off the back wall of the supracavernous  segment of the right internal carotid artery and near persistent fetal  origin of the posterior cerebral artery off the back wall of the  supracavernous left internal carotid artery and thus the vast majority  of the blood flow to the posterior cerebral artery territories is from  the internal carotid arteries and not the basilar artery and this likely  accounts for the small diameter basilar artery.      4. There is mild 10 to 20% stenosis of the origin of the proximal right  internal carotid arteries on the NASCET criteria, otherwise no carotid  stenosis seen on this exam. The remainder of the CT angiogram of the  head and neck is unremarkable. If there remains clinical suspicion of  acute stroke I recommend a MRI of the brain for more complete assessment  and at the time of MRI of the brain an  MRA of the neck could be obtained  with thin cut fat-suppressed T1 and T2-weighted images through the  vertebral arteries to further characterize the areas of stenosis to the  proximal cervical vertebral arteries. The results and recommendations  from this study were communicated to Dr. Reddy by telephone on  02/16/2018 at 9 PM.     Radiation dose reduction techniques were utilized, including automated  exposure control and exposure modulation based on body size.     This report was finalized on 2/17/2018 12:06 PM by Dr. Alexey Aldana MD.       XR Chest 1 View [[id number]] Collected:  02/16/18 2119     Updated:  02/19/18 0746    Narrative:       X-RAY CHEST 1 VIEW.     HISTORY: Acute stroke.     COMPARISON: 11/30/2017.     FINDINGS:  Cardiomediastinal silhouette is within normal limits.         There is no consolidation or effusion. Lung volumes are low. Pulmonary  congestion remains.          Impression:       Pulmonary congestion, no definite consolidation or effusion.         This report was finalized on 2/19/2018 7:43 AM by Dr. Steve Elise MD.             Objective:   Temp:  [97.5 °F (36.4 °C)-98.1 °F (36.7 °C)] 98.1 °F (36.7 °C)  Heart Rate:  [56-73] 56  Resp:  [16-18] 16  BP: (117-157)/() 117/68   SpO2:  [98 %] 98 %  on    O2 Device: room air    Intake/Output Summary (Last 24 hours) at 02/20/18 1156  Last data filed at 02/20/18 1011   Gross per 24 hour   Intake              420 ml   Output              200 ml   Net              220 ml     Body mass index is 26.85 kg/(m^2).  Last 3 weights    02/16/18 2009 02/16/18  5285   Weight: 86.2 kg (190 lb) 92.3 kg (203 lb 7.8 oz)     Weight change:     Physical Exam:      General: Alert, cooperative, in no acute distress. Slow to respond, but responds appropriately. Less dizziness          HEENT:  No oral lesions. No thrush. Oral mucosa moist.   Chest Wall:  No abnormalities observed.             Neck:  Trachea midline. No JVD.   Pulmonary:  CTAB. No wheezes.  Respirations regular, even and unlabored.          Cardio:  Regular rhythm and normal rate. Normal S1 and S2. No murmur, gallop, rub or click.    Abdominal:  Soft, non-tender and non-distended. Normal bowel sounds.  Extremities:  Moves all extremities well. No cyanosis. No redness. No edema.     Discharge Medications and Instructions:     Discharge Medications   Carlito Mo   Home Medication Instructions LICHA:[id number]    Printed on:02/20/18 8499   Medication Information                      amLODIPine (NORVASC) 5 MG tablet  Take 1 tablet by mouth Daily for 180 days.             aspirin 81 MG EC tablet  Take 1 tablet by mouth Daily.             atorvastatin (LIPITOR) 80 MG tablet  Take 1 tablet by mouth Daily.             buPROPion XL (WELLBUTRIN XL) 150 MG 24 hr tablet  Take 1 tablet by mouth Daily for 180 days.             clopidogrel (PLAVIX) 75 MG tablet  Take 1 tablet by mouth Daily.             docusate sodium (COLACE) 100 MG capsule  Take 1 capsule by mouth 2 (two) times a day.             insulin aspart (novoLOG FLEXPEN) 100 UNIT/ML solution pen-injector sc pen  150-199 mg/dL- 3 units  200-249 mg/dL- 5 units  250-299 mg/dL- 8 units  300-349 mg/dL- 10 units  350-400 mg/dL- 12 units  > 400 - call MD             Insulin Glargine (LANTUS SOLOSTAR) 100 UNIT/ML injection pen  Inject 20 Units under the skin Every Night.             Insulin Pen Needle (B-D ULTRAFINE III SHORT PEN) 31G X 8 MM misc  Use to inject 4x daily             levothyroxine (SYNTHROID) 50 MCG tablet  Take 1 tablet by mouth Daily.             metoprolol tartrate (LOPRESSOR) 100 MG tablet  Take 1 tablet by mouth Every 12 (Twelve) Hours.             RELION GLUCOSE TEST STRIPS test strip  1 each by Other route 2 (two) times a day. Use as instructed             SAXagliptin-MetFORMIN ER 2.5-1000 MG tablet sustained-release 24 hour  Take 2 tablets by mouth Daily. Pt taking 50/100 - 2 tablets with evening meal             valsartan (DIOVAN) 320 MG  tablet  Take 1 tablet by mouth Daily for 180 days.             vitamin D (ERGOCALCIFEROL) 27319 units capsule capsule  Take 1 cap 3 times weekly                 Discharge Diet:         Dietary Orders            Start     Ordered    02/17/18 1525  Diet Regular  Diet Effective Now     Question:  Diet Texture / Consistency  Answer:  Regular    02/17/18 1524          Activity at Discharge:    Activity Instructions     Activity as Tolerated                      Discharge disposition: Home    Discharge Instructions and Follow ups:    Follow-up Information     Follow up with García Rehman MD Follow up in 1 week(s).    Specialty:  Family Medicine    Why:  Bp check and review recent admission    Contact information:    [address]  [place] [id number]  [phone]          Follow up with JI Mello .    Specialty:  Endocrinology    Contact information:    [address] [facility] [address]  [place] [address]  [phone]          Follow up with JI Acosta .    Specialty:  Neurology    Contact information:    [address] [facility]  [place] [address]  [phone]          Future Appointments  Date Time Provider Department Center   3/9/2018 10:00 AM JI Acosta [facility] None   5/18/2018 2:40 PM [facility] None   6/1/2018 2:40 PM JI Mello [facility] None        Medication Reconciliation: Please see electronically completed Med Rec.    Total time spent discharging patient including evaluation, medication reconciliation, arranging follow up, and post hospitalization instructions and education total time exceeds 30 minutes.     Ivelisse Nicholson MD  02/20/18  11:56 AM    EMR Dragon/Transcription:   Much of this encounter note is an electronic transcription/translation of spoken language to printed text. The electronic translation of spoken language may permit erroneous, or at times, nonsensical words or phrases to be inadvertently  transcribed; Although I have reviewed the note for such errors, some may still exist.

## 2018-02-20 NOTE — PROGRESS NOTES
Discharge Planning Assessment  [facility]     Patient Name: Carlito Mo  MRN: [id number]  Today's Date: 2/20/2018    Admit Date: 2/16/2018          Discharge Needs Assessment       02/20/18 1159    Living Environment    Lives With spouse    Living Arrangements house    Home Accessibility bed and bath on same level    Transportation Available car;family or friend will provide    Discharge Needs Assessment    Concerns To Be Addressed denies needs/concerns at this time;medication concerns    Anticipated Changes Related to Illness none    Equipment Currently Used at Home none    Equipment Needed After Discharge none    Discharge Disposition home or self-care            Discharge Plan       02/20/18 1200    Case Management/Social Work Plan    Plan Home w/ spouse    Patient/Family In Agreement With Plan yes    Additional Comments Met w/ patient in room and introduced self and explained role.  Facesheet verified.  Discussed medication costs/ spouse also updated.  Medicaid Presumptive Eligibility paperwork/ [place] with [employer] ([phone]) at bedside.  [employer] prescription drug saving card membered# [id number] attached.  Provided GoodRx coupons found for valsartan 320mg, bupropion xl 150mg, clopidogrel 75mg and $4 list for amlodipine 5mg, metoprolol 50mg, metformin 1000levothyroxine 50mcg.  Provided Pt assistance programs forms for lantus and novolog.          Discharge Placement     No information found        Expected Discharge Date and Time     Expected Discharge Date Expected Discharge Time    Feb 20, 2018               Demographic Summary       02/20/18 1158    Referral Information    Admission Type inpatient    Arrived From admitted as an inpatient    Reason For Consult medication concerns    Contact Information    Permission Granted to Share Information With family/designee            Functional Status       02/20/18 1158    Functional Status Current    Ambulation 0-->independent    Transferring 0-->independent     Toileting 0-->independent    Bathing 0-->independent    Dressing 0-->independent    Eating 0-->independent    Communication 0-->understands/communicates without difficulty    Swallowing (if score 2 or more for any item, consult Rehab Services) 0-->swallows foods/liquids without difficulty    Functional Status Prior    Ambulation 0-->independent    Transferring 0-->independent    Toileting 0-->independent    Bathing 0-->independent    Dressing 0-->independent    Eating 0-->independent    Communication 0-->understands/communicates without difficulty    Swallowing 0-->swallows foods/liquids without difficulty    IADL    Medications independent    Meal Preparation independent    Housekeeping independent    Laundry independent    Shopping independent    Oral Care independent    Cognitive/Perceptual/Developmental    Current Mental Status/Cognitive Functioning no deficits noted                   Wilmer Rincon RN

## 2018-02-20 NOTE — PLAN OF CARE
Problem: Fall Risk (Adult)  Goal: Absence of Falls  Outcome: Ongoing (interventions implemented as appropriate)   02/20/18 0405   Fall Risk (Adult)   Absence of Falls making progress toward outcome       Problem: Patient Care Overview (Adult)  Goal: Plan of Care Review  Outcome: Ongoing (interventions implemented as appropriate)   02/20/18 0405   Coping/Psychosocial Response Interventions   Plan Of Care Reviewed With patient   Patient Care Overview   Progress no change   Outcome Evaluation   Outcome Summary/Follow up Plan NIH 0. AXOX4. VSS. No neurological changes noted throughout shift. Patient resting comfortably at this time. Will continue to monitor progress of patient care.

## 2018-02-20 NOTE — DISCHARGE INSTR - APPOINTMENTS
Call Geovanna with [employer] in 2- 3 days to ID# for medicaid (see paperwork provided by Geovanna with [employer]).  Once you have this number you can submit it to pharmacy for reimbursement under your passport plan.

## 2018-02-20 NOTE — NURSING NOTE
Pt is at baseline with both PT and OT- both have signed off. No need for acute rehab at this time. Please re consult if needed or changes occur.   Will sign off for now.     Lila Bentley RN  Acute Rehab Admission Nurse

## 2018-02-20 NOTE — THERAPY DISCHARGE NOTE
Acute Care - Physical Therapy Treatment Note/Discharge  [facility]     Patient Name: Carlito Mo  : 1955  MRN: [id number]  Today's Date: 2018        Referring Physician:  Maureen    Admit Date: 2018    Visit Dx:    ICD-10-CM ICD-9-CM   1. Hypertensive urgency I16.0 401.9   2. Encephalopathy acute G93.40 348.30   3. Impaired mobility Z74.09 799.89     Patient Active Problem List   Diagnosis   • Depression   • Hyperlipidemia   • Chronic ischemic heart disease   • Vitamin D deficiency   • Erectile dysfunction   • Chronic fatigue   • Diabetes mellitus   • Skin lesion of chest wall   • Slow transit constipation   • Atherosclerosis of coronary artery   • Benign prostatic hyperplasia   • Chronic kidney disease   • History of basal cell carcinoma   • Essential hypertension   • Acquired hypothyroidism   • Hypertensive urgency       Physical Therapy Education     Title: PT OT SLP Therapies (Done)     Topic: Physical Therapy (Resolved)     Point: Mobility training (Resolved)    Learning Progress Summary    Learner Readiness Method Response Comment Documented by Status   Patient Acceptance E VU   18 0932 Done    Acceptance E VU   18 1031 Done    Acceptance E VU Benefits of therapy and mobility to prevent muscle atrophy CK 18 1433 Done               Point: Home exercise program (Resolved)    Learning Progress Summary    Learner Readiness Method Response Comment Documented by Status   Patient Acceptance E VU   18 0932 Done    Acceptance E VU   18 1031 Done               Point: Body mechanics (Resolved)    Learning Progress Summary    Learner Readiness Method Response Comment Documented by Status   Patient Acceptance E VU   18 0932 Done               Point: Precautions (Resolved)    Learning Progress Summary    Learner Readiness Method Response Comment Documented by Status   Patient Acceptance E VU   18 0932 Done    Acceptance E VU Benefits of therapy and  mobility to prevent muscle atrophy CK 02/18/18 1433 Done                      User Key     Initials Effective Dates Name Provider Type Discipline    CK 04/24/15 -  Yoni Rodriguez, PT Physical Therapist PT    BJ 01/08/18 -  Scooby Gorman, PT Student PT Student PT                    IP PT Goals       02/18/18 1434          Bed Mobility PT LTG    Bed Mobility PT LTG, Date Established 02/18/18  -CK      Bed Mobility PT LTG, Time to Achieve 5 days  -CK      Bed Mobility PT LTG, Activity Type all bed mobility  -CK      Bed Mobility PT LTG, [place] Level independent  -CK      Transfer Training PT LTG    Transfer Training PT LTG, Date Established 02/18/18  -CK      Transfer Training PT LTG, Time to Achieve 5 days  -CK      Transfer Training PT LTG, Activity Type bed to chair /chair to bed  -CK      Transfer Training PT LTG, [place] Level independent  -CK      Transfer Training 2 PT LTG    Transfer Training PT 2 LTG, Date Established 02/18/18  -CK      Transfer Training PT 2 LTG, Time to Achieve 5 days  -CK      Transfer Training PT 2 LTG, Activity Type sit to stand/stand to sit  -CK      Transfer Training PT 2 LTG, [place] Level independent  -CK      Gait Training PT LTG    Gait Training Goal PT LTG, Date Established 02/18/18  -CK      Gait Training Goal PT LTG, Time to Achieve 5 days  -CK      Gait Training Goal PT LTG, [place] Level independent  -CK      Gait Training Goal PT LTG, Distance to Achieve 400 feet  -CK      Stair Training PT LTG    Stair Training Goal PT LTG, Date Established 02/18/18  -CK      Stair Training Goal PT LTG, Time to Achieve 5 days  -CK      Stair Training Goal PT LTG, Number of Steps 4  -CK      Stair Training Goal PT LTG, [place] Level independent  -CK      Stair Training Goal PT LTG, Assist Device 1 handrail  -CK        User Key  (r) = Recorded By, (t) = Taken By, (c) = Cosigned By    Initials Name Provider Type    CK Yoni Rodriguez, PT Physical Therapist               Adult Rehabilitation Note       02/20/18 0925 02/19/18 1022       Rehab Assessment/Intervention    Discipline (P)  physical therapist  - physical therapist  -TONY,BJ,KH2     Document Type (P)  therapy note (daily note);discharge summary  - therapy note (daily note)  -KH,BJ,KH2     Subjective Information (P)  agree to therapy;complains of;fatigue   Still not sleeping well.  -BJ agree to therapy;complains of;fatigue   States he didn't sleep last night  -TONY,BJ,KH2     Patient Effort, Rehab Treatment (P)  good  -BJ good  -KH,BJ,KH2     Precautions/Limitations (P)  fall precautions  -BJ fall precautions  -KH,BJ,KH2     Recorded by [ARVIND] Scooby Gorman, PT Student [TONY,BJ,KH2] Jody Burns, PT (r) Scooby Gorman, PT Student (t) Jody Burns, PT (c)     Pain Assessment    Pain Assessment (P)  No/denies pain  -BJ No/denies pain  -KH,BJ,KH2     Recorded by [BJ] Scooby Gorman, PT Student [TONY,BJ,KH2] Jody Burns, PT (r) Scooby Gorman PT Student (t) Jody Burns, PT (c)     Cognitive Assessment/Intervention    Current Cognitive/Communication Assessment  functional  -KH,BJ,KH2     Orientation Status  oriented x 4  -KH,BJ,KH2     Follows Commands/Answers Questions  100% of the time  -KH,BJ,KH2     Personal Safety  WNL/WFL  -KH,BJ,KH2     Personal Safety Interventions  fall prevention program maintained;gait belt;muscle strengthening facilitated;nonskid shoes/slippers when out of bed  -KH,BJ,KH2     Recorded by  [TONY,BJ,KH2] Jody Burns, PT (r) Scooby Gorman PT Student (t) Jody Burns, PT (c)     ROM (Range of Motion)    General ROM  no range of motion deficits identified  -KH,BJ,KH2     Recorded by  [TONY,BJ,KH2] Jody Burns, PT (r) Scooby Gorman PT Student (t) Jody Burns, PT (c)     MMT (Manual Muscle Testing)    General MMT Assessment  no strength deficits identified  -TONY,ARVIND,KH2     Recorded by  [TONY,ARVIND,KH2] Jody Lanier  Katy, PT (r) Scooby Gorman PT Student (t) Jody Burns, PT (c)     Bed Mobility, Assessment/Treatment    Bed Mob, Supine to Sit, [place] (P)  conditional independence  -BJ      Bed Mob, Sit to Supine, [place] (P)  conditional independence  -BJ      Recorded by [BJ] Scooby Gorman PT Student      Transfer Assessment/Treatment    Transfers, Sit-Stand [place] (P)  stand by assist  -BJ stand by assist   IV and catheter management  -KH,BJ,KH2     Transfers, Stand-Sit [place] (P)  stand by assist  -BJ stand by assist   IV and catheter management  -KH,BJ,KH2     Recorded by [BJ] Scooby Gorman, PT Student [KH,BJ,KH2] Jody Burns, PT (r) Scooby Gorman PT Student (t) Jody Burns, PT (c)     Gait Assessment/Treatment    Gait, [place] Level (P)  hand held assist  -BJ hand held assist  -KH,BJ,KH2     Gait, Distance (Feet) (P)  250  -  -KH,BJ,KH2     Gait, Gait Deviations (P)  eli decreased;decreased heel strike;narrow base;step length decreased  -BJ decreased heel strike;narrow base;step length decreased;stride length decreased  -KH,BJ,KH2     Gait, Safety Issues (P)  step length decreased  -BJ step length decreased  -KH,BJ,KH2     Gait, Impairments (P)  impaired balance  -BJ impaired balance  -KH,BJ,KH2     Gait, Comment (P)  He walked 250 ft in order to test stair ability this date.  -BJ He was able to walk 400 ft w/o much difficulty.  -KH,BJ,KH2     Recorded by [BJ] Scooby Gorman, PT Student [KH,BJ,KH2] Jody Burns, PT (r) Scooby Gorman, PT Student (t) Jody Burns, PT (c)     Stairs Assessment/Treatment    Number of Stairs (P)  10   +3 more w/o rail  -BJ      Stairs, Handrail Location (P)  left side (ascending)  -BJ      Stairs, [place] Level (P)  contact guard assist  -BJ      Stairs, Technique Used (P)  step over step (ascending);step over step (descending)  -BJ      Recorded by [BJ] Scooby Gorman, PT  Student      Motor Skills/Interventions    Additional Documentation (P)  Balance Skills Training (Group)  -BJ Balance Skills Training (Group)  -KH,BJ,KH2     Recorded by [BJ] Scooby Gorman, PT Student [KH,BJ,KH2] Jody Burns, PT (r) Scooby Gorman PT Student (t) Jody Burns, PT (c)     Balance Skills Training    Standing-Balance Activities (P)  Single Limb Stance  -BJ Single Limb Stance   Gate, backwards walking  -KH,BJ,KH2     Standing Balance # of Minutes (P)  1   -BJ 1  -KH,BJ,KH2     Gait Balance-Level of Assistance (P)  Contact guard  -BJ Contact guard  -KH,BJ,KH2     Gait Balance Activities (P)  backwards;braiding / front;grapevine;tandem   Improved w/ higher level balance activities  -BJ backwards;braiding / front;grapevine;tandem   Braiding and grapevine needing UE support  -KH,BJ,KH2     Gait Balance # of Minutes (P)  2  -BJ 2  -KH,BJ,KH2     Recorded by [BJ] Scooby Gorman, PT Student [KH,BJ,KH2] Jody Burns, PT (r) Scooby Gorman PT Student (t) Jody Burns, PT (c)     Therapy Exercises    Bilateral Lower Extremities (P)  AROM:;15 reps;sitting;ankle pumps/circles;glut sets;hip abduction/adduction;LAQ;quad sets  -BJ      Recorded by [BJ] Scooby Gorman PT Student      Positioning and Restraints    Pre-Treatment Position (P)  in bed  -BJ sitting in chair/recliner  -KH,BJ,KH2     Post Treatment Position (P)  bed  -BJ chair  -KH,BJ,KH2     In Bed (P)  supine;call light within reach;encouraged to call for assist  -BJ      In Chair  reclined;notified nsg;call light within reach;encouraged to call for assist;exit alarm on  -KH,BJ,KH2     Recorded by [BJ] Scooby Gorman, PT Student [KH,BJ,KH2] Jody Burns, PT (r) Scooby Gorman PT Student (t) Jody Burns, PT (c)       User Key  (r) = Recorded By, (t) = Taken By, (c) = Cosigned By    Initials Name Effective Dates    TONY Burns, PT 05/18/15 -     ARVIND Almodovar  Sherif, PT Student 01/08/18 -           PT Recommendation and Plan  PT Frequency: daily  Plan of Care Review  Outcome Summary/Follow up Plan: (P) Pt able to climb 13 total stairs this date, 3 w/o railing. He was improved on his higher level balance ex, requiring little to no UE to support himself. He is at or near baseline. PT will d/c as he has no further acute PT needs. Pt in agreement.           Outcome Measures       02/20/18 0900 02/19/18 1034 02/19/18 1003    How much help from another person do you currently need...    Turning from your back to your side while in flat bed without using bedrails? (P)  4  -BJ 4  -KH (r) BJ (t) KH (c)     Moving from lying on back to sitting on the side of a flat bed without bedrails? (P)  4  -BJ 4  -KH (r) BJ (t) KH (c)     Moving to and from a bed to a chair (including a wheelchair)? (P)  4  -BJ 4  -KH (r) BJ (t) KH (c)     Standing up from a chair using your arms (e.g., wheelchair, bedside chair)? (P)  4  -BJ 4  -KH (r) BJ (t) KH (c)     Climbing 3-5 steps with a railing? (P)  4  -BJ 3  -KH (r) BJ (t) KH (c)     To walk in hospital room? (P)  4  -BJ 4  -KH (r) BJ (t) KH (c)     AM-PAC 6 Clicks Score (P)  24  -BJ 23  -KH (r) BJ (t)     How much help from another is currently needed...    Putting on and taking off regular lower body clothing?   4  -LE    Bathing (including washing, rinsing, and drying)   4  -LE    Toileting (which includes using toilet bed pan or urinal)   4  -LE    Putting on and taking off regular upper body clothing   4  -LE    Taking care of personal grooming (such as brushing teeth)   4  -LE    Eating meals   4  -LE    Score   24  -LE    Functional Assessment    Outcome Measure Options (P)  AM-PAC 6 Clicks Basic Mobility (PT)  -BJ AM-PAC 6 Clicks Basic Mobility (PT)  -KH (r) BJ (t) KH (c)       02/19/18 1000 02/18/18 1400       How much help from another person do you currently need...    Turning from your back to your side while in flat bed without using  bedrails?  4  -CK     Moving from lying on back to sitting on the side of a flat bed without bedrails?  4  -CK     Moving to and from a bed to a chair (including a wheelchair)?  3  -CK     Standing up from a chair using your arms (e.g., wheelchair, bedside chair)?  3  -CK     Climbing 3-5 steps with a railing?  2  -CK     To walk in hospital room?  3  -CK     AM-PAC 6 Clicks Score  19  -CK     Functional Assessment    Outcome Measure Options AM-PAC 6 Clicks Daily Activity (OT)  -LE AM-PAC 6 Clicks Basic Mobility (PT)  -CK       User Key  (r) = Recorded By, (t) = Taken By, (c) = Cosigned By    Initials Name Provider Type    KH Jody Burns, PT Physical Therapist    ANAHI Barlow, OTR Occupational Therapist    WILI Rodriguez, PT Physical Therapist    ARVIND Gorman, PT Student PT Student           Time Calculation:         PT Charges       02/20/18 0936          Time Calculation    Start Time (P)  0912  -      Stop Time (P)  0925  -      Time Calculation (min) (P)  13 min  -BJ      PT Received On (P)  02/20/18  -        User Key  (r) = Recorded By, (t) = Taken By, (c) = Cosigned By    Initials Name Provider Type    ARVIND Gorman, PT Student PT Student          Therapy Charges for Today     Code Description Service Date Service Provider Modifiers Qty    68858451097 HC PT THER PROC EA 15 MIN 2/19/2018 Scooby Gorman PT Student GP 1    07905664024 HC PT THER PROC EA 15 MIN 2/20/2018 Scooby Gorman PT Student GP 1          PT G-Codes  Outcome Measure Options: (P) AM-PAC 6 Clicks Basic Mobility (PT)         Scooby Gorman PT Student  2/20/2018

## 2018-02-21 ENCOUNTER — HOSPITAL ENCOUNTER (OUTPATIENT)
Dept: CARDIOLOGY | Facility: HOSPITAL | Age: 63
Discharge: HOME | End: 2018-02-21
Admitting: NURSE PRACTITIONER

## 2018-02-21 DIAGNOSIS — R00.2 PALPITATIONS: Primary | ICD-10-CM

## 2018-02-21 DIAGNOSIS — R42 DIZZINESS: ICD-10-CM

## 2018-02-21 PROCEDURE — 0296T HC EXT ECG > 48HR TO 21 DAY RCRD W/CONECT INTL RCRD: CPT

## 2018-03-04 ENCOUNTER — HOSPITAL ENCOUNTER (EMERGENCY)
Facility: HOSPITAL | Age: 63
Discharge: HOME | End: 2018-03-04
Attending: EMERGENCY MEDICINE | Admitting: EMERGENCY MEDICINE

## 2018-03-04 VITALS
DIASTOLIC BLOOD PRESSURE: 79 MMHG | SYSTOLIC BLOOD PRESSURE: 164 MMHG | TEMPERATURE: 98.4 F | WEIGHT: 195 LBS | BODY MASS INDEX: 23.02 KG/M2 | RESPIRATION RATE: 18 BRPM | OXYGEN SATURATION: 96 % | HEIGHT: 77 IN | HEART RATE: 69 BPM

## 2018-03-04 DIAGNOSIS — J06.9 VIRAL UPPER RESPIRATORY TRACT INFECTION: Primary | ICD-10-CM

## 2018-03-04 PROCEDURE — 99283 EMERGENCY DEPT VISIT LOW MDM: CPT

## 2018-03-04 RX ORDER — ONDANSETRON 4 MG/1
4 TABLET, ORALLY DISINTEGRATING ORAL EVERY 6 HOURS PRN
Qty: 12 TABLET | Refills: 0 | Status: SHIPPED | OUTPATIENT
Start: 2018-03-04 | End: 2018-07-31

## 2018-03-04 NOTE — ED PROVIDER NOTES
EMERGENCY DEPARTMENT ENCOUNTER    CHIEF COMPLAINT  Chief Complaint: sore throat  History given by: patient  History limited by: N/A  Room Number: 37/37  PMD: García Rehman MD      HPI:  Pt is a 62 y.o. male who presents with sore throat that started about 2 days ago. He has also had productive cough with clear sputum, mild nausea, 1 episode of vomiting today, and loose stools. He denies trouble swallowing, ear pain, chest pain, dyspnea, fevers, chills, abd pain, and pain and difficulty with urination. He reports that he has had sick contact with spouse who has also had similar sx and was recently diagnosed with URI at [facility]. He reports that he has taken chloraseptic throat spray for sore throat without significant relief. Past Medical History of stroke (on plavix and 81mg ASA), CAD, diabetes, and HTN.     Duration: started about 2 days ago  Timing: intermittent   Location: throat  Radiation: none  Quality: sore  Intensity/Severity: moderate  Progression: unchanged  Associated Symptoms: productive cough with clear sputum, mild nausea, 1 episode of vomiting today, loose stools  Aggravating Factors: none  Alleviating Factors: none  Previous Episodes: none mentioned  Treatment before arrival: Pt reports that he has taken chloraseptic throat spray for sore throat without significant relief.     PAST MEDICAL HISTORY  Active Ambulatory Problems     Diagnosis Date Noted   • Depression    • Hyperlipidemia    • Chronic ischemic heart disease    • Vitamin D deficiency 12/17/2015   • Erectile dysfunction 12/17/2015   • Chronic fatigue 04/25/2016   • Diabetes mellitus 04/25/2016   • Skin lesion of chest wall 06/20/2016   • Slow transit constipation 09/01/2016   • Atherosclerosis of coronary artery 10/16/2012   • Benign prostatic hyperplasia 12/20/2016   • Chronic kidney disease 12/20/2016   • History of basal cell carcinoma 12/20/2016   • Essential hypertension 12/20/2016   • Acquired hypothyroidism 12/20/2017   • Hypertensive  urgency 02/16/2018   • Cerebrovascular accident (CVA) due to embolism of precerebral artery 02/20/2018     Resolved Ambulatory Problems     Diagnosis Date Noted   • Anemia    • Arthritis    • Diabetes mellitus out of control    • Type 2 diabetes mellitus    • Acute sinusitis    • Accumulation of fluid in tissues 12/17/2015   • Fatigue 12/17/2015   • Cardiomyopathy, ischemic 12/17/2015   • Acute appendicitis 03/02/2016   • Altered mental status 11/30/2017     Past Medical History:   Diagnosis Date   • Acute sinusitis    • Anemia    • Arthritis    • Chronic ischemic heart disease    • Depression    • Diabetes mellitus type 2, uncontrolled, without complications    • Heart attack    • Hyperlipidemia    • Hypertension    • Screening for prostate cancer 2011   • TIA (transient ischemic attack)    • Type 2 diabetes mellitus    • Vitamin D deficiency        PAST SURGICAL HISTORY  Past Surgical History:   Procedure Laterality Date   • APPENDECTOMY N/A 02/14/2016    Dr. Alexey Dodson   • CORONARY ARTERY BYPASS GRAFT  11/2001       FAMILY HISTORY  Family History   Problem Relation Age of Onset   • Diabetes Mother    • Hypertension Mother    • Macular degeneration Mother    • Alcohol abuse Father    • Cancer Father      lung   • Alcohol abuse Brother        SOCIAL HISTORY  Social History     Social History   • Marital status:      Spouse name: N/A   • Number of children: N/A   • Years of education: N/A     Occupational History   • Not on file.     Social History Main Topics   • Smoking status: Never Smoker   • Smokeless tobacco: Never Used   • Alcohol use No   • Drug use: No   • Sexual activity: Defer     Other Topics Concern   • Not on file     Social History Narrative         ALLERGIES  Prozac [fluoxetine hcl]    REVIEW OF SYSTEMS  Review of Systems   Constitutional: Negative for chills and fever.   HENT: Positive for sore throat. Negative for ear pain and trouble swallowing.    Respiratory: Positive for cough  (productive cough with clear sputum). Negative for shortness of breath.    Cardiovascular: Negative for chest pain.   Gastrointestinal: Positive for diarrhea (loose stools), nausea (mild) and vomiting (1 episode today). Negative for abdominal pain.   Genitourinary: Negative for difficulty urinating and dysuria.   Musculoskeletal: Negative for back pain.   Skin: Negative for rash.   Neurological: Negative for dizziness.   Psychiatric/Behavioral: The patient is not nervous/anxious.        PHYSICAL EXAM  ED Triage Vitals   Temp Heart Rate Resp BP SpO2   03/04/18 1432 03/04/18 1429 03/04/18 1429 03/04/18 1429 03/04/18 1429   98.4 °F (36.9 °C) 73 18 152/88 97 % WNL     Physical Exam   Constitutional: He is oriented to person, place, and time and well-developed, well-nourished, and in no distress. No distress.   HENT:   Head: Normocephalic and atraumatic.   Right Ear: Tympanic membrane and ear canal normal.   Left Ear: Tympanic membrane and ear canal normal.   Mouth/Throat: Mucous membranes are normal. No oropharyngeal exudate, posterior oropharyngeal edema or posterior oropharyngeal erythema.   Eyes: EOM are normal. No scleral icterus.   Neck: Normal range of motion. Neck supple.   Cardiovascular: Normal rate, regular rhythm and normal heart sounds.    Pulmonary/Chest: Effort normal and breath sounds normal. No respiratory distress.   Abdominal: Soft. There is no tenderness. There is no rebound and no guarding.   Musculoskeletal: Normal range of motion.   Lymphadenopathy:     He has no cervical adenopathy.   Neurological: He is alert and oriented to person, place, and time. He has normal motor skills and normal sensation.   Skin: Skin is warm and dry.   Psychiatric: Mood and affect normal.   Nursing note and vitals reviewed.        PROGRESS AND CONSULTS  3:00 PM- Reviewed implications of results, diagnosis, meds, responsibility to follow up, warning signs and symptoms of possible worsening, potential complications and  "reasons to return to ER with patient. Discussed all results and noted any abnormalities with patient.  Discussed absolute need to recheck abnormalities and condition with PMD. Informed pt that his sx could possibly be due to viral URI. Notified pt of plan to prescribe antiemetic. Advised pt to rest, to well hydrate, and to take OTC cough syrup and mucinex.   Discussed plan for discharge, as there is no emergent indication for admission.  Pt is agreeable and understands need for follow up and repeat testing.  Pt is aware that discharge does not mean that nothing is wrong but it indicates no emergency is present.  Pt is discharged with instructions to follow up with primary care doctor to have their blood pressure rechecked.   3:37 PM- Reviewed pt's history and workup with Dr. Marquez.  At bedside evaluation, they agree with the plan of care.      DIAGNOSIS  Final diagnoses:   Viral upper respiratory tract infection       FOLLOW UP   García Rehman MD  [address] [facility] [address]  [place] [id number]  [phone]    Schedule an appointment as soon as possible for a visit  As needed      RX     Medication List      ondansetron ODT 4 MG disintegrating tablet   Commonly known as:  ZOFRAN-ODT   Take 1 tablet by mouth Every 6 (Six) Hours As Needed for Nausea or   Vomiting.       MEDICATIONS GIVEN IN ER  Medications - No data to display    /79  Pulse 69  Temp 98.4 °F (36.9 °C) (Oral)   Resp 18  Ht 195.6 cm (77\")  Wt 88.5 kg (195 lb)  SpO2 96%  BMI 23.12 kg/m2      I personally reviewed the past medical history, past surgical history, social history, family history, current medications and allergies as they appear in this chart.  The scribe's note accurately reflects the work and decisions made by me.     Documentation assistance provided by lida Mcelroy for ELEUTERIO Zendejas on 3/4/2018 at 3:39 PM. Information recorded by the myriamibagustín was done at my direction and has been verified and validated by " me.     Mojgan Mcelroy  03/04/18 1553       Myrna Elliott, APRN  03/04/18 0303

## 2018-03-04 NOTE — ED PROVIDER NOTES
Pt presents to ED c/o sore throat that began two days ago. Pt also c/o cough and decreased sleep. Pt's wife had similar symptoms recently.    PHYSICAL EXAM    Constitutional: A&O x 3, Pt is resting comfortably and in no acute distress.  HENT: HENT unremarkable    Discussed diagnosis with pt and plan to discharge with URI. Pt understands and agrees with the plan, all questions answered.    I supervised care provided by the midlevel provider.    We have discussed this patient's history, physical exam, and treatment plan.   I have reviewed the note and personally saw and examined the patient and agree with the plan of care.    Documentation assistance provided by lida Rodarte for Dr. Marquez.  Information recorded by the scribe was done at my direction and has been verified and validated by me.         Pamela Rodarte  03/04/18 4479       Elvin Marquez MD  03/04/18 9961

## 2018-03-04 NOTE — ED NOTES
"Pt states \"my throat is sore. My son called 911 because I threw up. My wife was recently diagnosed with a URI\"          Amanda Sorenson RN  03/04/18 9920    "

## 2018-03-04 NOTE — DISCHARGE INSTRUCTIONS
Medications as ordered  Rest, increase fluids  Tylenol or Motrin as needed  Over the counter cough syrup and mucinex  Follow up with pmd as needed  Return to er for fever, chills, vomiting, diarrhea, chest pain, shortness of air, inability to tolerate po fluids or any new or worsening symptoms

## 2018-03-04 NOTE — ED TRIAGE NOTES
Sore throat, cough, vomiting and diarrhea x 3 days. Pt wife had same thing went to [facility] dx with URI. Pt son called EMS.

## 2018-03-05 ENCOUNTER — TELEPHONE (OUTPATIENT)
Dept: SOCIAL WORK | Facility: HOSPITAL | Age: 63
End: 2018-03-05

## 2018-03-05 NOTE — TELEPHONE ENCOUNTER
ED f/u phone call: states he is drinking plenty of fluids, taking meds as advised and not yet feeling any better. No questions/concerns

## 2018-03-09 PROCEDURE — 0298T HOLTER MONITOR - 72 HOUR UP TO 21 DAY: CPT | Performed by: INTERNAL MEDICINE

## 2018-03-14 ENCOUNTER — TELEPHONE (OUTPATIENT)
Dept: NEUROLOGY | Facility: CLINIC | Age: 63
End: 2018-03-14

## 2018-03-14 NOTE — TELEPHONE ENCOUNTER
----- Message from JI Dee sent at 3/14/2018 11:27 AM EDT -----  Per Dr. Reddy; LT cardiac monitor WNL.     No cardiac w/u indicated at this time.

## 2018-03-14 NOTE — TELEPHONE ENCOUNTER
I called and spoke with Mr. Mo.  I let him know that the cardiac monitor he wore was within normal limits as per Dr. Reddy.  No cardiac workup needed at this time.  KD Moore RN

## 2018-04-09 ENCOUNTER — TELEPHONE (OUTPATIENT)
Dept: NEUROLOGY | Facility: CLINIC | Age: 63
End: 2018-04-09

## 2018-04-09 NOTE — TELEPHONE ENCOUNTER
"I called and spoke with Mr. Mo.  He told me he is always very tired.  Not sleeping at night has a 33 year old live in child that wakes him up very early every morning.  Mr. Mo was in the hospital the first part of March with a sore throat but was not admitted.  He said he felt awful.  Better now.  He is driving now without difficult.  I reminded him of his follow up appointment with Alisia WORKMAN on June 4th.  He does not remember seeing the paper work so I resent a packet with date, time and location.  He did tell me his blood pressures have been a little high, 180/105 this morning.  Also his blood sugars run \"a little on the katherine side\".  I encouraged him to make an appointment with his PCP Dr. Rehman to have it checked.  His home medications include:  Norvasc 5 mg daily, Diovan 320 mg daily, Metformin ER 2.5/1000 mg 2 times a day,Lantus at night time, ASA 81 mg EC daily and Plavix 75 mg daily.   mRS 1.  KD Moore RN  "

## 2018-04-18 ENCOUNTER — OFFICE VISIT (OUTPATIENT)
Dept: CARDIOLOGY | Facility: CLINIC | Age: 63
End: 2018-04-18

## 2018-04-18 VITALS
SYSTOLIC BLOOD PRESSURE: 154 MMHG | WEIGHT: 180.2 LBS | BODY MASS INDEX: 24.41 KG/M2 | HEART RATE: 74 BPM | DIASTOLIC BLOOD PRESSURE: 72 MMHG | HEIGHT: 72 IN

## 2018-04-18 DIAGNOSIS — I10 ESSENTIAL HYPERTENSION: Primary | ICD-10-CM

## 2018-04-18 PROCEDURE — 93000 ELECTROCARDIOGRAM COMPLETE: CPT | Performed by: INTERNAL MEDICINE

## 2018-04-18 PROCEDURE — 99214 OFFICE O/P EST MOD 30 MIN: CPT | Performed by: INTERNAL MEDICINE
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
## 2018-04-30 ENCOUNTER — APPOINTMENT (OUTPATIENT)
Dept: CT IMAGING | Facility: HOSPITAL | Age: 63
End: 2018-04-30

## 2018-04-30 ENCOUNTER — HOSPITAL ENCOUNTER (INPATIENT)
Facility: HOSPITAL | Age: 63
LOS: 4 days | Discharge: HOME | End: 2018-05-04
Attending: EMERGENCY MEDICINE | Admitting: INTERNAL MEDICINE

## 2018-04-30 DIAGNOSIS — I67.4 HYPERTENSIVE ENCEPHALOPATHY SYNDROME: Primary | ICD-10-CM

## 2018-04-30 DIAGNOSIS — Z91.14 HISTORY OF MEDICATION NONCOMPLIANCE: ICD-10-CM

## 2018-04-30 DIAGNOSIS — R26.2 DIFFICULTY WALKING: ICD-10-CM

## 2018-04-30 DIAGNOSIS — R73.9 HYPERGLYCEMIA: ICD-10-CM

## 2018-04-30 LAB
ALBUMIN SERPL-MCNC: 4.2 G/DL (ref 3.5–5.2)
ALBUMIN/GLOB SERPL: 1.4 G/DL
ALP SERPL-CCNC: 82 U/L (ref 39–117)
ALT SERPL W P-5'-P-CCNC: 12 U/L (ref 1–41)
AMPHET+METHAMPHET UR QL: NEGATIVE
ANION GAP SERPL CALCULATED.3IONS-SCNC: 12.1 MMOL/L
AST SERPL-CCNC: 12 U/L (ref 1–40)
B-OH-BUTYR SERPL-SCNC: 0.09 MMOL/L (ref 0.02–0.27)
BARBITURATES UR QL SCN: NEGATIVE
BASOPHILS # BLD AUTO: 0 10*3/MM3 (ref 0–0.2)
BASOPHILS NFR BLD AUTO: 0 % (ref 0–1.5)
BENZODIAZ UR QL SCN: NEGATIVE
BILIRUB SERPL-MCNC: 0.3 MG/DL (ref 0.1–1.2)
BUN BLD-MCNC: 16 MG/DL (ref 8–23)
BUN/CREAT SERPL: 10.4 (ref 7–25)
CALCIUM SPEC-SCNC: 10.2 MG/DL (ref 8.6–10.5)
CANNABINOIDS SERPL QL: NEGATIVE
CHLORIDE SERPL-SCNC: 95 MMOL/L (ref 98–107)
CO2 SERPL-SCNC: 28.9 MMOL/L (ref 22–29)
COCAINE UR QL: NEGATIVE
CREAT BLD-MCNC: 1.54 MG/DL (ref 0.76–1.27)
D-LACTATE SERPL-SCNC: 1.9 MMOL/L (ref 0.5–2)
DEPRECATED RDW RBC AUTO: 41.2 FL (ref 37–54)
EOSINOPHIL # BLD AUTO: 0.17 10*3/MM3 (ref 0–0.7)
EOSINOPHIL NFR BLD AUTO: 2.8 % (ref 0.3–6.2)
ERYTHROCYTE [DISTWIDTH] IN BLOOD BY AUTOMATED COUNT: 14.1 % (ref 11.5–14.5)
ETHANOL BLD-MCNC: <10 MG/DL (ref 0–10)
ETHANOL UR QL: <0.01 %
GFR SERPL CREATININE-BSD FRML MDRD: 46 ML/MIN/1.73
GLOBULIN UR ELPH-MCNC: 3 GM/DL
GLUCOSE BLD-MCNC: 368 MG/DL (ref 65–99)
GLUCOSE BLDC GLUCOMTR-MCNC: 229 MG/DL (ref 70–130)
HCT VFR BLD AUTO: 41.4 % (ref 40.4–52.2)
HGB BLD-MCNC: 13.1 G/DL (ref 13.7–17.6)
IMM GRANULOCYTES # BLD: 0 10*3/MM3 (ref 0–0.03)
IMM GRANULOCYTES NFR BLD: 0 % (ref 0–0.5)
LYMPHOCYTES # BLD AUTO: 1.7 10*3/MM3 (ref 0.9–4.8)
LYMPHOCYTES NFR BLD AUTO: 28.2 % (ref 19.6–45.3)
MCH RBC QN AUTO: 25.5 PG (ref 27–32.7)
MCHC RBC AUTO-ENTMCNC: 31.6 G/DL (ref 32.6–36.4)
MCV RBC AUTO: 80.7 FL (ref 79.8–96.2)
METHADONE UR QL SCN: NEGATIVE
MONOCYTES # BLD AUTO: 0.41 10*3/MM3 (ref 0.2–1.2)
MONOCYTES NFR BLD AUTO: 6.8 % (ref 5–12)
NEUTROPHILS # BLD AUTO: 3.74 10*3/MM3 (ref 1.9–8.1)
NEUTROPHILS NFR BLD AUTO: 62.2 % (ref 42.7–76)
OPIATES UR QL: NEGATIVE
OXYCODONE UR QL SCN: NEGATIVE
PLATELET # BLD AUTO: 285 10*3/MM3 (ref 140–500)
PMV BLD AUTO: 11.3 FL (ref 6–12)
POTASSIUM BLD-SCNC: 4.4 MMOL/L (ref 3.5–5.2)
PROT SERPL-MCNC: 7.2 G/DL (ref 6–8.5)
RBC # BLD AUTO: 5.13 10*6/MM3 (ref 4.6–6)
SODIUM BLD-SCNC: 136 MMOL/L (ref 136–145)
WBC NRBC COR # BLD: 6.02 10*3/MM3 (ref 4.5–10.7)

## 2018-04-30 PROCEDURE — 82962 GLUCOSE BLOOD TEST: CPT

## 2018-04-30 PROCEDURE — 25010000002 LORAZEPAM PER 2 MG: Performed by: EMERGENCY MEDICINE

## 2018-04-30 PROCEDURE — 82010 KETONE BODYS QUAN: CPT | Performed by: EMERGENCY MEDICINE

## 2018-04-30 PROCEDURE — 80053 COMPREHEN METABOLIC PANEL: CPT | Performed by: EMERGENCY MEDICINE

## 2018-04-30 PROCEDURE — 80307 DRUG TEST PRSMV CHEM ANLYZR: CPT | Performed by: EMERGENCY MEDICINE

## 2018-04-30 PROCEDURE — 83605 ASSAY OF LACTIC ACID: CPT | Performed by: EMERGENCY MEDICINE

## 2018-04-30 PROCEDURE — 85025 COMPLETE CBC W/AUTO DIFF WBC: CPT | Performed by: EMERGENCY MEDICINE

## 2018-04-30 PROCEDURE — 63710000001 INSULIN REGULAR HUMAN PER 5 UNITS: Performed by: EMERGENCY MEDICINE

## 2018-04-30 PROCEDURE — 36415 COLL VENOUS BLD VENIPUNCTURE: CPT

## 2018-04-30 PROCEDURE — 99285 EMERGENCY DEPT VISIT HI MDM: CPT

## 2018-04-30 PROCEDURE — 25010000002 ONDANSETRON PER 1 MG

## 2018-04-30 PROCEDURE — 70450 CT HEAD/BRAIN W/O DYE: CPT

## 2018-04-30 RX ORDER — SODIUM CHLORIDE 0.9 % (FLUSH) 0.9 %
10 SYRINGE (ML) INJECTION AS NEEDED
Status: DISCONTINUED | OUTPATIENT
Start: 2018-04-30 | End: 2018-05-04 | Stop reason: HOSPADM

## 2018-04-30 RX ORDER — ONDANSETRON 2 MG/ML
4 INJECTION INTRAMUSCULAR; INTRAVENOUS ONCE
Status: COMPLETED | OUTPATIENT
Start: 2018-04-30 | End: 2018-04-30

## 2018-04-30 RX ORDER — OLANZAPINE 10 MG/1
10 INJECTION, POWDER, LYOPHILIZED, FOR SOLUTION INTRAMUSCULAR ONCE
Status: COMPLETED | OUTPATIENT
Start: 2018-04-30 | End: 2018-04-30

## 2018-04-30 RX ORDER — ONDANSETRON 2 MG/ML
INJECTION INTRAMUSCULAR; INTRAVENOUS
Status: COMPLETED
Start: 2018-04-30 | End: 2018-04-30

## 2018-04-30 RX ORDER — LORAZEPAM 2 MG/ML
1 INJECTION INTRAMUSCULAR ONCE
Status: COMPLETED | OUTPATIENT
Start: 2018-04-30 | End: 2018-04-30

## 2018-04-30 RX ADMIN — SODIUM CHLORIDE 1000 ML: 9 INJECTION, SOLUTION INTRAVENOUS at 18:04

## 2018-04-30 RX ADMIN — NICARDIPINE HYDROCHLORIDE 5 MG/HR: 2.5 INJECTION INTRAVENOUS at 23:45

## 2018-04-30 RX ADMIN — HUMAN INSULIN 10 UNITS: 100 INJECTION, SOLUTION SUBCUTANEOUS at 21:35

## 2018-04-30 RX ADMIN — LORAZEPAM 1 MG: 2 INJECTION INTRAMUSCULAR; INTRAVENOUS at 21:36

## 2018-04-30 RX ADMIN — ONDANSETRON 4 MG: 2 INJECTION INTRAMUSCULAR; INTRAVENOUS at 21:36

## 2018-04-30 RX ADMIN — OLANZAPINE 10 MG: 10 INJECTION, POWDER, FOR SOLUTION INTRAMUSCULAR at 17:46

## 2018-04-30 RX ADMIN — METOPROLOL TARTRATE 5 MG: 5 INJECTION, SOLUTION INTRAVENOUS at 21:37

## 2018-04-30 RX ADMIN — METOPROLOL TARTRATE 5 MG: 5 INJECTION, SOLUTION INTRAVENOUS at 18:20

## 2018-05-01 ENCOUNTER — APPOINTMENT (OUTPATIENT)
Dept: MRI IMAGING | Facility: HOSPITAL | Age: 63
End: 2018-05-01

## 2018-05-01 LAB
ANION GAP SERPL CALCULATED.3IONS-SCNC: 12.9 MMOL/L
BASOPHILS # BLD AUTO: 0 10*3/MM3 (ref 0–0.2)
BASOPHILS NFR BLD AUTO: 0 % (ref 0–1.5)
BUN BLD-MCNC: 16 MG/DL (ref 8–23)
BUN/CREAT SERPL: 14.7 (ref 7–25)
CALCIUM SPEC-SCNC: 8.5 MG/DL (ref 8.6–10.5)
CHLORIDE SERPL-SCNC: 101 MMOL/L (ref 98–107)
CO2 SERPL-SCNC: 25.1 MMOL/L (ref 22–29)
CREAT BLD-MCNC: 1.09 MG/DL (ref 0.76–1.27)
DEPRECATED RDW RBC AUTO: 41.7 FL (ref 37–54)
EOSINOPHIL # BLD AUTO: 0.03 10*3/MM3 (ref 0–0.7)
EOSINOPHIL NFR BLD AUTO: 0.4 % (ref 0.3–6.2)
ERYTHROCYTE [DISTWIDTH] IN BLOOD BY AUTOMATED COUNT: 14.3 % (ref 11.5–14.5)
GFR SERPL CREATININE-BSD FRML MDRD: 69 ML/MIN/1.73
GLUCOSE BLD-MCNC: 179 MG/DL (ref 65–99)
GLUCOSE BLDC GLUCOMTR-MCNC: 114 MG/DL (ref 70–130)
GLUCOSE BLDC GLUCOMTR-MCNC: 140 MG/DL (ref 70–130)
GLUCOSE BLDC GLUCOMTR-MCNC: 147 MG/DL (ref 70–130)
GLUCOSE BLDC GLUCOMTR-MCNC: 167 MG/DL (ref 70–130)
GLUCOSE BLDC GLUCOMTR-MCNC: 176 MG/DL (ref 70–130)
GLUCOSE BLDC GLUCOMTR-MCNC: 176 MG/DL (ref 70–130)
HCT VFR BLD AUTO: 33.9 % (ref 40.4–52.2)
HGB BLD-MCNC: 10.7 G/DL (ref 13.7–17.6)
IMM GRANULOCYTES # BLD: 0.02 10*3/MM3 (ref 0–0.03)
IMM GRANULOCYTES NFR BLD: 0.2 % (ref 0–0.5)
LYMPHOCYTES # BLD AUTO: 1.16 10*3/MM3 (ref 0.9–4.8)
LYMPHOCYTES NFR BLD AUTO: 14.5 % (ref 19.6–45.3)
MCH RBC QN AUTO: 25.4 PG (ref 27–32.7)
MCHC RBC AUTO-ENTMCNC: 31.6 G/DL (ref 32.6–36.4)
MCV RBC AUTO: 80.3 FL (ref 79.8–96.2)
MONOCYTES # BLD AUTO: 0.35 10*3/MM3 (ref 0.2–1.2)
MONOCYTES NFR BLD AUTO: 4.4 % (ref 5–12)
NEUTROPHILS # BLD AUTO: 6.48 10*3/MM3 (ref 1.9–8.1)
NEUTROPHILS NFR BLD AUTO: 80.7 % (ref 42.7–76)
PLATELET # BLD AUTO: 233 10*3/MM3 (ref 140–500)
PMV BLD AUTO: 10.8 FL (ref 6–12)
POTASSIUM BLD-SCNC: 4 MMOL/L (ref 3.5–5.2)
PROCALCITONIN SERPL-MCNC: 0.05 NG/ML (ref 0.1–0.25)
RBC # BLD AUTO: 4.22 10*6/MM3 (ref 4.6–6)
SODIUM BLD-SCNC: 139 MMOL/L (ref 136–145)
TROPONIN T SERPL-MCNC: 0.02 NG/ML (ref 0–0.03)
TROPONIN T SERPL-MCNC: 0.03 NG/ML (ref 0–0.03)
TROPONIN T SERPL-MCNC: 0.04 NG/ML (ref 0–0.03)
WBC NRBC COR # BLD: 8.02 10*3/MM3 (ref 4.5–10.7)

## 2018-05-01 PROCEDURE — 84484 ASSAY OF TROPONIN QUANT: CPT | Performed by: INTERNAL MEDICINE

## 2018-05-01 PROCEDURE — 93005 ELECTROCARDIOGRAM TRACING: CPT | Performed by: INTERNAL MEDICINE

## 2018-05-01 PROCEDURE — 25010000002 ENOXAPARIN PER 10 MG: Performed by: INTERNAL MEDICINE

## 2018-05-01 PROCEDURE — 80048 BASIC METABOLIC PNL TOTAL CA: CPT | Performed by: INTERNAL MEDICINE

## 2018-05-01 PROCEDURE — 70551 MRI BRAIN STEM W/O DYE: CPT

## 2018-05-01 PROCEDURE — 99254 IP/OBS CNSLTJ NEW/EST MOD 60: CPT | Performed by: INTERNAL MEDICINE

## 2018-05-01 PROCEDURE — 82962 GLUCOSE BLOOD TEST: CPT

## 2018-05-01 PROCEDURE — 63710000001 INSULIN DETEMER PER 5 UNITS: Performed by: INTERNAL MEDICINE

## 2018-05-01 PROCEDURE — 84145 PROCALCITONIN (PCT): CPT | Performed by: INTERNAL MEDICINE

## 2018-05-01 PROCEDURE — 99253 IP/OBS CNSLTJ NEW/EST LOW 45: CPT | Performed by: PSYCHIATRY & NEUROLOGY

## 2018-05-01 PROCEDURE — 85025 COMPLETE CBC W/AUTO DIFF WBC: CPT | Performed by: INTERNAL MEDICINE

## 2018-05-01 PROCEDURE — 93010 ELECTROCARDIOGRAM REPORT: CPT | Performed by: INTERNAL MEDICINE

## 2018-05-01 PROCEDURE — 63710000001 INSULIN ASPART PER 5 UNITS: Performed by: INTERNAL MEDICINE

## 2018-05-01 RX ORDER — SODIUM CHLORIDE 9 MG/ML
75 INJECTION, SOLUTION INTRAVENOUS CONTINUOUS
Status: DISCONTINUED | OUTPATIENT
Start: 2018-05-01 | End: 2018-05-01

## 2018-05-01 RX ORDER — DOCUSATE SODIUM 100 MG/1
100 CAPSULE, LIQUID FILLED ORAL DAILY
Status: DISCONTINUED | OUTPATIENT
Start: 2018-05-01 | End: 2018-05-04 | Stop reason: HOSPADM

## 2018-05-01 RX ORDER — AMLODIPINE BESYLATE 5 MG/1
5 TABLET ORAL DAILY
Status: DISCONTINUED | OUTPATIENT
Start: 2018-05-01 | End: 2018-05-04 | Stop reason: HOSPADM

## 2018-05-01 RX ORDER — ASPIRIN 81 MG/1
81 TABLET ORAL DAILY
Status: DISCONTINUED | OUTPATIENT
Start: 2018-05-01 | End: 2018-05-04 | Stop reason: HOSPADM

## 2018-05-01 RX ORDER — NICOTINE POLACRILEX 4 MG
15 LOZENGE BUCCAL
Status: DISCONTINUED | OUTPATIENT
Start: 2018-05-01 | End: 2018-05-04 | Stop reason: HOSPADM

## 2018-05-01 RX ORDER — LEVOTHYROXINE SODIUM 0.05 MG/1
50 TABLET ORAL
Status: DISCONTINUED | OUTPATIENT
Start: 2018-05-01 | End: 2018-05-04 | Stop reason: HOSPADM

## 2018-05-01 RX ORDER — METOPROLOL TARTRATE 100 MG/1
100 TABLET ORAL EVERY 12 HOURS SCHEDULED
Status: DISCONTINUED | OUTPATIENT
Start: 2018-05-01 | End: 2018-05-04 | Stop reason: HOSPADM

## 2018-05-01 RX ORDER — SODIUM CHLORIDE 0.9 % (FLUSH) 0.9 %
1-10 SYRINGE (ML) INJECTION AS NEEDED
Status: DISCONTINUED | OUTPATIENT
Start: 2018-05-01 | End: 2018-05-04 | Stop reason: HOSPADM

## 2018-05-01 RX ORDER — ATORVASTATIN CALCIUM 80 MG/1
80 TABLET, FILM COATED ORAL DAILY
Status: DISCONTINUED | OUTPATIENT
Start: 2018-05-01 | End: 2018-05-04 | Stop reason: HOSPADM

## 2018-05-01 RX ORDER — PROMETHAZINE HYDROCHLORIDE 25 MG/ML
12.5 INJECTION, SOLUTION INTRAMUSCULAR; INTRAVENOUS EVERY 6 HOURS PRN
Status: DISCONTINUED | OUTPATIENT
Start: 2018-05-01 | End: 2018-05-04 | Stop reason: HOSPADM

## 2018-05-01 RX ORDER — CLOPIDOGREL BISULFATE 75 MG/1
75 TABLET ORAL DAILY
Status: DISCONTINUED | OUTPATIENT
Start: 2018-05-01 | End: 2018-05-04 | Stop reason: HOSPADM

## 2018-05-01 RX ORDER — DEXTROSE MONOHYDRATE 25 G/50ML
25 INJECTION, SOLUTION INTRAVENOUS
Status: DISCONTINUED | OUTPATIENT
Start: 2018-05-01 | End: 2018-05-04 | Stop reason: HOSPADM

## 2018-05-01 RX ADMIN — CLOPIDOGREL 75 MG: 75 TABLET, FILM COATED ORAL at 13:09

## 2018-05-01 RX ADMIN — ATORVASTATIN CALCIUM 80 MG: 80 TABLET, FILM COATED ORAL at 13:11

## 2018-05-01 RX ADMIN — ENOXAPARIN SODIUM 40 MG: 40 INJECTION SUBCUTANEOUS at 11:48

## 2018-05-01 RX ADMIN — SODIUM CHLORIDE 75 ML/HR: 9 INJECTION, SOLUTION INTRAVENOUS at 00:32

## 2018-05-01 RX ADMIN — METOPROLOL TARTRATE 100 MG: 100 TABLET ORAL at 13:09

## 2018-05-01 RX ADMIN — INSULIN ASPART 2 UNITS: 100 INJECTION, SOLUTION INTRAVENOUS; SUBCUTANEOUS at 21:28

## 2018-05-01 RX ADMIN — INSULIN ASPART 2 UNITS: 100 INJECTION, SOLUTION INTRAVENOUS; SUBCUTANEOUS at 17:50

## 2018-05-01 RX ADMIN — INSULIN DETEMIR 10 UNITS: 100 INJECTION, SOLUTION SUBCUTANEOUS at 21:28

## 2018-05-01 RX ADMIN — ASPIRIN 81 MG: 81 TABLET ORAL at 13:09

## 2018-05-01 NOTE — CONSULTS
Patient Name: Carlito Mo  :1955  62 y.o.    Date of Admission: 2018  Encounter Provider: Amber Murguia MD  Date of Encounter Visit: 18  Place of Service: [facility] CARDIOLOGY  Referring Provider: Robi Castañeda MD  Patient Care Team:  García Rehman MD as PCP - General (Family Medicine)  García Rehman MD as PCP - Family Medicine      Chief complaint: AMS    Consulted for: elevated troponin    History of Present Illness: Mr. Mo is a 61 y/o with a history of ICM, CAD s/p CABG , DM, BPH, CKD, hypothyroidism, embolic CVA  and hyperlipidemia. He presented to the ED last night with c/o AMS and hyperglycemia. On arrival his bp was 212/109, HR 83. On arrival his glucose was 368, bun/creat 16/1.54, unremarkable cbc and neg drug/alcohol screen. CT head was negative. He was started on IVF, lopressor 5mg IV x 2 was given and he was admitted to ICU. He was started on Cardene, which was stopped just after midnight.     He is followed by me and was last seen by Dr. Witt in office on 18. At that time he had no cardiac complaints and bp was 154/72. EKG showed NSR with RBBB. We also saw in  when he was admitted with an embolic stroke, elevated troponin and HTN. An echo was done that showed an EF of 62% and grade I diastolic dysfunction. He wore a Ziopatch that was abnormal-NSR with rare PAC, PVC, and SVT (9 episodes). The elevated troponin (0.038) was not felt to be cardiac in nature so no work up was done for that.     We have been asked to see for elevated troponin, although there is not one done this admission. His current bp is 158/85, HR 90. He has been in NSR with rare PVCs. He is very sleepy this am and difficult to arouse. When admitted he was extremely agitate and received Ativan and Zyprexa. It is reported that there were no complaints; just confusion, agitation and aggression. Home bp meds have not been started.     He is sleepy. Wakes and  answers questions. Wife not present. Spoke with RN. Denies CP or SOA. Aggitation better.     Cardiac Testing:  Echo 2/17/18  · Calculated EF = 62.9%  · Left ventricular systolic function is normal.  · Left ventricular diastolic dysfunction (grade I) consistent with impaired relaxation.  · Mild mitral valve regurgitation is present  · Saline test results are negative.  Holter Monitor 2/21/18  · An abnormal monitor study.  · NSR WITH RARE PAC, PVC, NSSVT, VT    Past Medical History:   Diagnosis Date   • Acute sinusitis    • Anemia    • Arthritis    • Cancer     skin   • Chronic ischemic heart disease    • Depression    • Diabetes mellitus type 2, uncontrolled, without complications    • Heart attack    • Hyperlipidemia    • Hypertension    • Screening for prostate cancer 2011   • Stroke    • TIA (transient ischemic attack)    • Type 2 diabetes mellitus    • Vitamin D deficiency        Past Surgical History:   Procedure Laterality Date   • APPENDECTOMY N/A 02/14/2016    Dr. Alexey Dodson   • CORONARY ARTERY BYPASS GRAFT  11/2001         Prior to Admission medications    Medication Sig Start Date End Date Taking? Authorizing Provider   aspirin 81 MG EC tablet Take 1 tablet by mouth Daily. 2/21/18  Yes JI Hearn   Atorvastatin Calcium (LIPITOR PO) Take  by mouth.   Yes Historical Provider, MD   buPROPion XL (WELLBUTRIN XL) 150 MG 24 hr tablet Take 1 tablet by mouth Daily for 180 days. 2/20/18 8/19/18 Yes JI Hearn   clopidogrel (PLAVIX) 75 MG tablet Take 1 tablet by mouth Daily. 2/21/18  Yes JI Hearn   FEXOFENADINE HCL PO Take  by mouth.   Yes Historical Provider, MD   Insulin Glulisine (APIDRA SC) Inject  under the skin.   Yes Historical Provider, MD   ipratropium-albuterol (COMBIVENT RESPIMAT)  MCG/ACT inhaler Inhale 1 puff.   Yes Historical Provider, MD   metoprolol tartrate (LOPRESSOR) 100 MG tablet Take 1 tablet by mouth Every 12 (Twelve) Hours. 12/6/17  Yes Erich García,  MD   valsartan (DIOVAN) 320 MG tablet Take 1 tablet by mouth Daily for 180 days. 1/18/18 7/17/18 Yes García Rehman MD   vitamin D (ERGOCALCIFEROL) 07618 units capsule capsule Take 1 cap 3 times weekly 12/20/17  Yes JI Mello   amLODIPine (NORVASC) 5 MG tablet Take 1 tablet by mouth Daily for 180 days. 1/18/18 7/17/18  García Rehman MD   docusate sodium (COLACE) 100 MG capsule Take 1 capsule by mouth 2 (two) times a day.  Patient taking differently: Take 100 mg by mouth Daily. 8/27/16   NICOLE Mckeon   insulin aspart (novoLOG FLEXPEN) 100 UNIT/ML solution pen-injector sc pen 150-199 mg/dL- 3 units  200-249 mg/dL- 5 units  250-299 mg/dL- 8 units  300-349 mg/dL- 10 units  350-400 mg/dL- 12 units  > 400 - call MD 2/20/18   JI Hearn   Insulin Glargine (LANTUS SOLOSTAR) 100 UNIT/ML injection pen Inject 20 Units under the skin Every Night. 2/20/18   JI Hearn   Insulin Pen Needle (B-D ULTRAFINE III SHORT PEN) 31G X 8 MM misc Use to inject 4x daily 12/18/17   JI Mello   levothyroxine (SYNTHROID) 50 MCG tablet Take 1 tablet by mouth Daily. 12/20/17 12/20/18  JI Mello   ondansetron ODT (ZOFRAN-ODT) 4 MG disintegrating tablet Take 1 tablet by mouth Every 6 (Six) Hours As Needed for Nausea or Vomiting. 3/4/18   JI Reed   RELION GLUCOSE TEST STRIPS test strip 1 each by Other route 2 (two) times a day. Use as instructed    Historical Provider, MD   SAXagliptin-MetFORMIN ER 2.5-1000 MG tablet sustained-release 24 hour Take 2 tablets by mouth Daily. Pt taking 50/100 - 2 tablets with evening meal 12/12/17   JI Herman       Allergies   Allergen Reactions   • Fluoxetine Other (See Comments)   • Prozac [Fluoxetine Hcl] Other (See Comments)     shaking       Social History     Social History   • Marital status:      Social History Main Topics   • Smoking status: Never Smoker   • Smokeless tobacco: Never Used   • Alcohol use No   •  Drug use: No   • Sexual activity: Defer     Other Topics Concern   • Not on file       Family History   Problem Relation Age of Onset   • Diabetes Mother    • Hypertension Mother    • Macular degeneration Mother    • Alcohol abuse Father    • Cancer Father      lung   • Alcohol abuse Brother        REVIEW OF SYSTEMS:   All other systems reviewed and negative.        Objective:     Vitals:    05/01/18 0705 05/01/18 0735 05/01/18 0805 05/01/18 0835   BP: 153/86 149/87 141/83 153/83   Pulse: 89 87 87 96   Resp:       Temp:       TempSrc:       SpO2: 93% 94% 92% 94%   Weight:       Height:         Body mass index is 24.31 kg/m².    Intake/Output Summary (Last 24 hours) at 05/01/18 0938  Last data filed at 05/01/18 0605   Gross per 24 hour   Intake             1451 ml   Output                0 ml   Net             1451 ml     Constitutional: He is oriented to person, place, and time. He appears well-developed. He does not appear ill.   HENT:   Head: Normocephalic and atraumatic. Head is without contusion.   Right Ear: Hearing normal. No drainage.   Left Ear: Hearing normal. No drainage.   Nose: No nasal deformity. No epistaxis.   Eyes: Lids are normal. Right eye exhibits no exudate. Left eye exhibits no exudate.  Neck: No JVD present. Carotid bruit is not present. No tracheal deviation present. No thyroid mass and no thyromegaly present.   Cardiovascular: Normal rate, regular rhythm and normal heart sounds.    Pulses:       Posterior tibial pulses are 2+ on the right side, and 2+ on the left side.   Pulmonary/Chest: Effort normal and breath sounds normal.   Abdominal: Soft. Normal appearance and bowel sounds are normal. There is no tenderness.   Musculoskeletal: Normal range of motion.        Right shoulder: He exhibits no deformity.        Left shoulder: He exhibits no deformity.   Neurological: He is alert and oriented to person, place, and time. He has normal strength.   Skin: Skin is warm, dry and intact. No rash  noted.   Psychiatric: He has a normal mood and affect. His behavior is normal. Thought content normal.   Vitals reviewed      Lab Review:       Results from last 7 days  Lab Units 04/30/18  1744   SODIUM mmol/L 136   POTASSIUM mmol/L 4.4   CHLORIDE mmol/L 95*   CO2 mmol/L 28.9   BUN mg/dL 16   CREATININE mg/dL 1.54*   CALCIUM mg/dL 10.2   BILIRUBIN mg/dL 0.3   ALK PHOS U/L 82   ALT (SGPT) U/L 12   AST (SGOT) U/L 12   GLUCOSE mg/dL 368*       Results from last 7 days  Lab Units 05/01/18  0041   TROPONIN T ng/mL 0.017       Results from last 7 days  Lab Units 04/30/18  1744   WBC 10*3/mm3 6.02   HEMOGLOBIN g/dL 13.1*   HEMATOCRIT % 41.4   PLATELETS 10*3/mm3 285                    I personally viewed and interpreted the patient's EKG/Telemetry data.        Assessment and Plan:       1.  Chronically elevated troponin.  No acute changes on EKG.  2.  History of stroke.  Transesophageal echocardiogram did not show cardiac source of embolus.  Zio patch did not show atrial fibrillation.  3.  History of coronary artery disease with remote history of bypass surgery in 2001.  3.  Hypertension  4.  Hyperlipidemia  5.  Diabetes  6.  Chronic kidney disease  7.  Acute mental status change.  Neurology consulted.  8.  Right bundle branch block    - Repeat troponin 6 hours after the first.  If stable, no further cardiac workup  - Blood pressure control  - Keep follow-up appointment with me in July    Amber Murguia MD  05/01/18  9:38 AM

## 2018-05-01 NOTE — PLAN OF CARE
Problem: Fall Risk (Adult)  Goal: Identify Related Risk Factors and Signs and Symptoms  Outcome: Ongoing (interventions implemented as appropriate)   05/01/18 0509   Fall Risk (Adult)   Related Risk Factors (Fall Risk) confusion/agitation;culprit medication(s);fatigue/slow reaction;gait/mobility problems;neuro disease/injury;sensory deficits;slippery/uneven surfaces;environment unfamiliar   Signs and Symptoms (Fall Risk) presence of risk factors     Goal: Absence of Fall  Outcome: Ongoing (interventions implemented as appropriate)   05/01/18 0509   Fall Risk (Adult)   Absence of Fall making progress toward outcome       Problem: Skin Injury Risk (Adult)  Goal: Identify Related Risk Factors and Signs and Symptoms  Outcome: Ongoing (interventions implemented as appropriate)   05/01/18 0509   Skin Injury Risk (Adult)   Related Risk Factors (Skin Injury Risk) cognitive impairment;critical care admission;mobility impaired     Goal: Skin Health and Integrity  Outcome: Ongoing (interventions implemented as appropriate)   05/01/18 0509   Skin Injury Risk (Adult)   Skin Health and Integrity making progress toward outcome       Problem: Hypertensive Disease/Crisis (Arterial) (Adult)  Goal: Signs and Symptoms of Listed Potential Problems Will be Absent, Minimized or Managed (Hypertensive Disease/Crisis)  Outcome: Ongoing (interventions implemented as appropriate)   05/01/18 0509   Goal/Outcome Evaluation   Problems Assessed (Hypertensive Disease/Crisis (Arterial)) all   Problems Present (Hypertensive Disease) neurologic deterioration;situational response       Problem: Confusion, Acute (Adult)  Goal: Identify Related Risk Factors and Signs and Symptoms  Outcome: Ongoing (interventions implemented as appropriate)   05/01/18 0509   Confusion, Acute (Adult)   Related Risk Factors (Acute Confusion) other (see comments)  (hypertensive encephalopathy)   Signs and Symptoms (Acute Confusion) acute onset of  symptoms;disorientation;emotional/behavioral disturbances;thought process diminished/disorganized;reasoning/planning disturbed;understanding disturbed     Goal: Cognitive/Functional Impairments Minimized  Outcome: Ongoing (interventions implemented as appropriate)   05/01/18 0509   Confusion, Acute (Adult)   Cognitive/Functional Impairments Minimized making progress toward outcome     Goal: Safety  Outcome: Ongoing (interventions implemented as appropriate)   05/01/18 0509   Confusion, Acute (Adult)   Safety making progress toward outcome

## 2018-05-01 NOTE — ED NOTES
CT brings pt back at this time and reports that they were unable to get pt onto CT bed due to pt flailing around. This RN and DEMAR Argueta attempted to put brief back onto pt and he would not stay still. MD Marquez notified; no new orders received. Will continue to monitor pt and keep CT tech up to date on pt condition.      Theresa Smith RN  04/30/18 2008

## 2018-05-01 NOTE — ED NOTES
RN went into room to give ativan and metoprolol as ordered. Pt began vomiting prior to med administration. MD Marquez notified. Order given for zofran. Pt linens changed and pt cleaned up. Will continue to monitor.      Ellie Carrero RN  04/30/18 7299

## 2018-05-01 NOTE — PLAN OF CARE
Problem: Patient Care Overview  Goal: Plan of Care Review  Outcome: Ongoing (interventions implemented as appropriate)   05/01/18 0511   Coping/Psychosocial   Plan of Care Reviewed With patient   Coping/Psychosocial   Patient Agreement with Plan of Care unable to participate   Plan of Care Review   Progress no change   OTHER   Outcome Summary Pt admitted from ER for hypertensive encephalopathy, pt's BP 200s over 1teens on admission, cardene gtt initiated, pt at that time was agitated and confused. Pt currently off of cardene gtt with BP 120s-140s, pt mostly sleeping, easily arouses but falls right back asleep. Occasionally answers questions but uncooperative with neuro exams. Pt vomited once and ER and once upon arrival to unit, abdomen soft and nondistended. Low grade temps this shift, procal checked and was 0.05. Spouse did not come to hopsital with patient and stated she did not wish to be contacted regarding pt's care. Will continue to monitor. SAGE Resendiz RN

## 2018-05-01 NOTE — H&P
Group: [facility]         H/p  NOTE    Patient Identification:  Carlito Mo  62 y.o.  male  1955  [id number]              CC:     History of Present Illness:  62-year-old gentleman history of diabetes mellitus coronary artery disease and CVA presented to the emergency room with altered mental status.  Patient currently unable to give me any meaningful history.  Per records patient was agitated at home and per EMS patient has had history of multiple similar visit due to noncompliance of his medications.  No family at bedside.  In the emergency room patient was noted to be in hypertensive encephalopathy.  No history of aspiration or neck pain was reported.      Review of Systems  Unable to get with the patient's present condition  Past Medical History:  Past Medical History:   Diagnosis Date   • Acute sinusitis    • Anemia    • Arthritis    • Cancer     skin   • Chronic ischemic heart disease    • Depression    • Diabetes mellitus type 2, uncontrolled, without complications    • Heart attack    • Hyperlipidemia    • Hypertension    • Screening for prostate cancer 2011   • Stroke    • TIA (transient ischemic attack)    • Type 2 diabetes mellitus    • Vitamin D deficiency        Past Surgical History:  Past Surgical History:   Procedure Laterality Date   • APPENDECTOMY N/A 02/14/2016    Dr. Alexey Dodson   • CORONARY ARTERY BYPASS GRAFT  11/2001        Home Meds:  Prescriptions Prior to Admission   Medication Sig Dispense Refill Last Dose   • aspirin 81 MG EC tablet Take 1 tablet by mouth Daily. 30 tablet 3 Taking   • Atorvastatin Calcium (LIPITOR PO) Take  by mouth.      • buPROPion XL (WELLBUTRIN XL) 150 MG 24 hr tablet Take 1 tablet by mouth Daily for 180 days. 30 tablet 1 Taking   • clopidogrel (PLAVIX) 75 MG tablet Take 1 tablet by mouth Daily. 30 tablet 3 Taking   • FEXOFENADINE HCL PO Take  by mouth.      • Insulin Glulisine (APIDRA SC) Inject  under the skin.      • ipratropium-albuterol  (COMBIVENT RESPIMAT)  MCG/ACT inhaler Inhale 1 puff.      • metoprolol tartrate (LOPRESSOR) 100 MG tablet Take 1 tablet by mouth Every 12 (Twelve) Hours.   Unknown   • valsartan (DIOVAN) 320 MG tablet Take 1 tablet by mouth Daily for 180 days. 30 tablet 5 Taking   • vitamin D (ERGOCALCIFEROL) 55993 units capsule capsule Take 1 cap 3 times weekly 24 capsule 1 Taking   • amLODIPine (NORVASC) 5 MG tablet Take 1 tablet by mouth Daily for 180 days. 30 tablet 5 Unknown   • docusate sodium (COLACE) 100 MG capsule Take 1 capsule by mouth 2 (two) times a day. (Patient taking differently: Take 100 mg by mouth Daily.) 30 capsule 0 Taking   • insulin aspart (novoLOG FLEXPEN) 100 UNIT/ML solution pen-injector sc pen 150-199 mg/dL- 3 units  200-249 mg/dL- 5 units  250-299 mg/dL- 8 units  300-349 mg/dL- 10 units  350-400 mg/dL- 12 units  > 400 - call  Units 2 Taking   • Insulin Glargine (LANTUS SOLOSTAR) 100 UNIT/ML injection pen Inject 20 Units under the skin Every Night. 1 pen 3 Taking   • Insulin Pen Needle (B-D ULTRAFINE III SHORT PEN) 31G X 8 MM misc Use to inject 4x daily 150 each 5 Taking   • levothyroxine (SYNTHROID) 50 MCG tablet Take 1 tablet by mouth Daily. 30 tablet 5 Unknown   • ondansetron ODT (ZOFRAN-ODT) 4 MG disintegrating tablet Take 1 tablet by mouth Every 6 (Six) Hours As Needed for Nausea or Vomiting. 12 tablet 0 Unknown   • RELION GLUCOSE TEST STRIPS test strip 1 each by Other route 2 (two) times a day. Use as instructed   Taking   • SAXagliptin-MetFORMIN ER 2.5-1000 MG tablet sustained-release 24 hour Take 2 tablets by mouth Daily. Pt taking 50/100 - 2 tablets with evening meal 60 tablet 5 Unknown       Allergies:  Allergies   Allergen Reactions   • Fluoxetine Other (See Comments)   • Prozac [Fluoxetine Hcl] Other (See Comments)     shaking       Social History:   Social History     Social History   • Marital status:      Spouse name: N/A   • Number of children: N/A   • Years of education:  "N/A     Occupational History   • Not on file.     Social History Main Topics   • Smoking status: Never Smoker   • Smokeless tobacco: Never Used   • Alcohol use No   • Drug use: No   • Sexual activity: Defer     Other Topics Concern   • Not on file     Social History Narrative   • No narrative on file       Family History:  Family History   Problem Relation Age of Onset   • Diabetes Mother    • Hypertension Mother    • Macular degeneration Mother    • Alcohol abuse Father    • Cancer Father      lung   • Alcohol abuse Brother        Physical Exam:  BP (!) 192/113   Pulse 87   Temp 99.3 °F (37.4 °C) (Tympanic)   Resp 18   Ht 182.9 cm (72\")   Wt 86.2 kg (190 lb)   SpO2 95%   BMI 25.77 kg/m²  Body mass index is 25.77 kg/m². 95% 86.2 kg (190 lb)  Physical Exam  Sleepy lethargic and confused  He appears to be disheveled  Neck is supple no JVD no bruit no adenopathy  Chest diminished breath sounds no wheezing or rales  CV is regular rate and rhythm tachycardic  Abdomen is soft nontender no masses felt  Extremities no edema the sinuses no clubbing  CNS disoriented but moves all extremities  No hallucination or delusion or joint deformities no skin rashes    LABS:  Lab Results   Component Value Date    CALCIUM 10.2 04/30/2018    PHOS 4.9 (H) 02/18/2018     Results from last 7 days  Lab Units 04/30/18  1744   SODIUM mmol/L 136   POTASSIUM mmol/L 4.4   CHLORIDE mmol/L 95*   CO2 mmol/L 28.9   BUN mg/dL 16   CREATININE mg/dL 1.54*   GLUCOSE mg/dL 368*   CALCIUM mg/dL 10.2   WBC 10*3/mm3 6.02   HEMOGLOBIN g/dL 13.1*   PLATELETS 10*3/mm3 285   ALT (SGPT) U/L 12   AST (SGOT) U/L 12     Lab Results   Component Value Date    CKTOTAL 80 11/30/2017    TROPONINT 0.038 (H) 02/17/2018               Results from last 7 days  Lab Units 04/30/18  1744   LACTATE mmol/L 1.9                     Lab Results   Component Value Date    TSH 5.490 (H) 12/18/2017     Estimated Creatinine Clearance: 60.6 mL/min (by C-G formula based on SCr of " 1.54 mg/dL (H)).         Imaging: I personally visualized the images of scans/x-rays performed within last 3 days.      Assessment:  Altered mental status  Hypertensive encephalopathy  Hyperglycemia  History of medical noncompliance  Elevated troponin  Acute renal failure  History of CVA      Recommendations:  Etiology of altered mental status possibly hypertensive encephalopathy  We will use Cardene drip to lower blood pressures gradually  Neurology will be consulted for altered mental status  Cardiology reconsulted for elevated troponin  We'll check baseline EKG  Will attempt to lower blood pressure is slowly and gradually and monitor mental status slowly  ICU core measures  No family currently at bedside  Continue to monitor clinical status closely    Critical care time 35 minutes          Robi Castañeda MD  5/1/2018  12:07 AM      Much of this encounter note is an electronic transcription/translation of spoken language to printed text using Dragon Software.

## 2018-05-01 NOTE — ED NOTES
Hematoma noted to right wrist. No witnessed injury. Pt unable to verbalize cause of injury. Ice therapy to be initiated      Ellie Carrero RN  04/30/18 [id number]

## 2018-05-01 NOTE — H&P
Encounter Date: 5/1/2018    CHIEF COMPLAINT:  Altered mental status and referred by .    Patient Active Problem List   Diagnosis   • Depression   • Hyperlipidemia   • Chronic ischemic heart disease   • Vitamin D deficiency   • Erectile dysfunction   • Chronic fatigue   • Diabetes mellitus   • Skin lesion of chest wall   • Slow transit constipation   • Atherosclerosis of coronary artery   • Benign prostatic hyperplasia   • Chronic kidney disease   • History of basal cell carcinoma   • Essential hypertension   • Acquired hypothyroidism   • Hypertensive urgency   • Cerebrovascular accident (CVA) due to embolism of precerebral artery   • Hypertensive encephalopathy syndrome       PRESENT ILLNESS:    Portions of this notes is copied from previous physician encounters, reviewed and edited appropriately.    Background:     Carlito Mo is a 62 y.o. male with history of coronary artery disease, diabetes, TIA, hypertension dyslipidemia now delivered with altered mental status. December 2017 for altered mental status and at that time was diagnosed to have acute CVA likely cardioembolic and had NEISHA which demonstrated a small PFO.  February is admitted with altered mental status.  At that time his blood pressure and blood sugars were significantly elevated.  At that time he had embolic CVA and multiple*vascular distribution. B12 388 A1c 11.6 TSH 5.4 LDL 89.given his noncompliance without the stroke was secondary to his uncontrolled risk factors.  Patient was continued with his Antiplatelet therapy and plan for his Holter monitoring as outpatient.    Patient was readmitted last night with altered mental status.  The emergency was blood sugar and blood pressure was significantly elevated and was found to be encephalopathic.  No family available to give history about medication compliance.  Multiple admissions in the past for medication noncompliance.    Patient examined by the bedside: Patient blood pressure and blood  sugar much better controlled and is completed back to his baseline where he is alert oriented.  Denies any weakness      Review of systems   No neck stiffness  No photophobia  All systems reviewed and are negative.         Past Medical History:   Diagnosis Date   • Acute sinusitis    • Anemia    • Arthritis    • Cancer     skin   • Chronic ischemic heart disease    • Depression    • Diabetes mellitus type 2, uncontrolled, without complications    • Heart attack    • Hyperlipidemia    • Hypertension    • Screening for prostate cancer 2011   • Stroke    • TIA (transient ischemic attack)    • Type 2 diabetes mellitus    • Vitamin D deficiency        Past Surgical History:   Procedure Laterality Date   • APPENDECTOMY N/A 02/14/2016    Dr. Alexey Dodson   • CORONARY ARTERY BYPASS GRAFT  11/2001       Current Facility-Administered Medications   Medication Dose Route Frequency Provider Last Rate Last Dose   • amLODIPine (NORVASC) tablet 5 mg  5 mg Oral Daily Jorge Shukla MD       • aspirin EC tablet 81 mg  81 mg Oral Daily Robi Castañeda MD   81 mg at 05/01/18 1309   • atorvastatin (LIPITOR) tablet 80 mg  80 mg Oral Daily Robi Castañeda MD   80 mg at 05/01/18 1311   • clopidogrel (PLAVIX) tablet 75 mg  75 mg Oral Daily Robi Castañeda MD   75 mg at 05/01/18 1309   • dextrose (D50W) solution 25 g  25 g Intravenous Q15 Min PRN Jorge Shukla MD       • dextrose (GLUTOSE) oral gel 15 g  15 g Oral Q15 Min PRN Jorge Shukla MD       • docusate sodium (COLACE) capsule 100 mg  100 mg Oral Daily Jorge Shukla MD       • enoxaparin (LOVENOX) syringe 40 mg  40 mg Subcutaneous Q24H Jorge Shukla MD   40 mg at 05/01/18 1148   • glucagon (human recombinant) (GLUCAGEN DIAGNOSTIC) injection 1 mg  1 mg Subcutaneous PRN Jorge Shukla MD       • insulin aspart (novoLOG) injection 0-9 Units  0-9 Units Subcutaneous 4x Daily With Meals & Nightly Jorge Shukla MD       •  insulin detemir (LEVEMIR) injection 10 Units  10 Units Subcutaneous Nightly Jorge Shukla MD       • levothyroxine (SYNTHROID, LEVOTHROID) tablet 50 mcg  50 mcg Oral Q AM Robi Castañeda MD       • metoprolol tartrate (LOPRESSOR) tablet 100 mg  100 mg Oral Q12H Jorge Shukla MD   100 mg at 18 1309   • niCARdipine (CARDENE) 25 mg/250 mL (0.1 mg/mL) 0.9% NS VTB infusion  5-15 mg/hr Intravenous Titrated Elvin Marquez MD   Stopped at 18 0042   • promethazine (PHENERGAN) injection 12.5 mg  12.5 mg Intravenous Q6H PRN Robi Castañeda MD       • sodium chloride 0.9 % flush 1-10 mL  1-10 mL Intravenous PRN Robi Castañeda MD       • sodium chloride 0.9 % flush 10 mL  10 mL Intravenous PRN Elvin Marquez MD           Allergies   Allergen Reactions   • Fluoxetine Other (See Comments)   • Prozac [Fluoxetine Hcl] Other (See Comments)     shaking       Social History     Social History   • Marital status:      Spouse name: N/A   • Number of children: N/A   • Years of education: N/A     Occupational History   • Not on file.     Social History Main Topics   • Smoking status: Never Smoker   • Smokeless tobacco: Never Used   • Alcohol use No   • Drug use: No   • Sexual activity: Defer     Other Topics Concern   • Not on file     Social History Narrative   • No narrative on file       Family History   Problem Relation Age of Onset   • Diabetes Mother    • Hypertension Mother    • Macular degeneration Mother    • Alcohol abuse Father    • Cancer Father      lung   • Alcohol abuse Brother        Family Status   Relation Status   • Mother    • Father    • Brother        No current facility-administered medications on file prior to encounter.      Current Outpatient Prescriptions on File Prior to Encounter   Medication Sig   • aspirin 81 MG EC tablet Take 1 tablet by mouth Daily.   • buPROPion XL (WELLBUTRIN XL) 150 MG 24 hr tablet Take 1 tablet by mouth Daily for 180 days.    • clopidogrel (PLAVIX) 75 MG tablet Take 1 tablet by mouth Daily.   • metoprolol tartrate (LOPRESSOR) 100 MG tablet Take 1 tablet by mouth Every 12 (Twelve) Hours.   • valsartan (DIOVAN) 320 MG tablet Take 1 tablet by mouth Daily for 180 days.   • vitamin D (ERGOCALCIFEROL) 31556 units capsule capsule Take 1 cap 3 times weekly   • amLODIPine (NORVASC) 5 MG tablet Take 1 tablet by mouth Daily for 180 days.   • docusate sodium (COLACE) 100 MG capsule Take 1 capsule by mouth 2 (two) times a day. (Patient taking differently: Take 100 mg by mouth Daily.)   • insulin aspart (novoLOG FLEXPEN) 100 UNIT/ML solution pen-injector sc pen 150-199 mg/dL- 3 units  200-249 mg/dL- 5 units  250-299 mg/dL- 8 units  300-349 mg/dL- 10 units  350-400 mg/dL- 12 units  > 400 - call MD   • Insulin Glargine (LANTUS SOLOSTAR) 100 UNIT/ML injection pen Inject 20 Units under the skin Every Night.   • Insulin Pen Needle (B-D ULTRAFINE III SHORT PEN) 31G X 8 MM misc Use to inject 4x daily   • levothyroxine (SYNTHROID) 50 MCG tablet Take 1 tablet by mouth Daily.   • ondansetron ODT (ZOFRAN-ODT) 4 MG disintegrating tablet Take 1 tablet by mouth Every 6 (Six) Hours As Needed for Nausea or Vomiting.   • RELION GLUCOSE TEST STRIPS test strip 1 each by Other route 2 (two) times a day. Use as instructed   • SAXagliptin-MetFORMIN ER 2.5-1000 MG tablet sustained-release 24 hour Take 2 tablets by mouth Daily. Pt taking 50/100 - 2 tablets with evening meal           PHYSICAL EXAMINATION:    Vitals: Patient Vitals for the past 4 hrs:   BP Temp Temp src Pulse SpO2   05/01/18 1309 138/72 - - 73 -   05/01/18 1205 119/70 - - 70 94 %   05/01/18 1200 - 97.4 °F (36.3 °C) Oral - -     Temp:  [97.4 °F (36.3 °C)-99.7 °F (37.6 °C)] 97.4 °F (36.3 °C)  Heart Rate:  [] 73  Resp:  [14-18] 16  BP: (101-215)/() 138/72    General:  pleasant in no acute distress.  HEENT: No pallor or icterus. Neck supple. No Carotid bruits.  Heart: S1 and S2 normal with regular  rhythm.  No murmur.       GENERAL NEUROLOGIC EXAM     Mental Status: Awake alert and oriented to person place and time. Language is normal for comprehension, repetition, naming and fluency. Recent and remote memory were normal.  Attention span and concentration were normal. Fund of knowledge was normal.      Cranial nerves:    Pupils bilaterally equal, no facial asymmetry, tongue is in the midline     Motor:  mild weakness in the right upper and lower extremity      Sensation:  withdraws to pain bilaterally equally     Reflexes: 1+ bilaterally symmetrical with down going toes.    Coordination: finger nose and heel shin test normal bilaterally.     Gait:  deffered     Labs:     Lab Results   Component Value Date    HGB 10.7 (L) 05/01/2018    HCT 33.9 (L) 05/01/2018    WBC 8.02 05/01/2018     05/01/2018     Lab Results   Component Value Date    BUN 16 05/01/2018    CALCIUM 8.5 (L) 05/01/2018     05/01/2018    K 4.0 05/01/2018     05/01/2018    CO2 25.1 05/01/2018     (H) 12/12/2017     Lab Results   Component Value Date    ALT 12 04/30/2018    AST 12 04/30/2018    BILITOT 0.3 04/30/2018     Lab Results   Component Value Date    PHOS 4.9 (H) 02/18/2018    MG 2.1 02/20/2018    PROTIME 13.8 02/20/2018    INR 1.08 02/20/2018     No components found for: POCGLUC  No components found for: A1C  Lab Results   Component Value Date    HDL 50 02/18/2018     (H) 02/18/2018     No components found for: B12  Lab Results   Component Value Date    TSH 5.490 (H) 12/18/2017       Lab Results (last 24 hours)     Procedure Component Value Units Date/Time    POC Glucose Once [[id number]]  (Abnormal) Collected:  05/01/18 1533    Specimen:  Blood Updated:  05/01/18 1534     Glucose 176 (H) mg/dL     Narrative:       Meter: [id number] : [id number] Srinath RAMIREZ    Troponin [[id number]] Collected:  05/01/18 1431    Specimen:  Blood from Arm, Right Updated:  05/01/18 1442    POC Glucose Once [[id number]]   (Abnormal) Collected:  05/01/18 1139    Specimen:  Blood Updated:  05/01/18 1151     Glucose 167 (H) mg/dL     Narrative:       Meter: [id number] : [id number] Cruz Moreno RN    Troponin [[id number]]  (Abnormal) Collected:  05/01/18 0910    Specimen:  Blood Updated:  05/01/18 1027     Troponin T 0.032 (H) ng/mL     Narrative:       Troponin T Reference Ranges:  Less than 0.03 ng/mL:    Negative for AMI  0.03 to 0.09 ng/mL:      Indeterminant for AMI  Greater than 0.09 ng/mL: Positive for AMI    Basic Metabolic Panel [[id number]]  (Abnormal) Collected:  05/01/18 0910    Specimen:  Blood Updated:  05/01/18 1025     Glucose 179 (H) mg/dL      BUN 16 mg/dL      Creatinine 1.09 mg/dL      Sodium 139 mmol/L      Potassium 4.0 mmol/L      Chloride 101 mmol/L      CO2 25.1 mmol/L      Calcium 8.5 (L) mg/dL      eGFR Non African Amer 69 mL/min/1.73      BUN/Creatinine Ratio 14.7     Anion Gap 12.9 mmol/L     Narrative:       GFR Normal >60  Chronic Kidney Disease <60  Kidney Failure <15    CBC & Differential [[id number]] Collected:  05/01/18 0910    Specimen:  Blood Updated:  05/01/18 1002    Narrative:       The following orders were created for panel order CBC & Differential.  Procedure                               Abnormality         Status                     ---------                               -----------         ------                     CBC Auto Differential[[id number]]        Abnormal            Final result                 Please view results for these tests on the individual orders.    CBC Auto Differential [[id number]]  (Abnormal) Collected:  05/01/18 0910    Specimen:  Blood Updated:  05/01/18 1002     WBC 8.02 10*3/mm3      RBC 4.22 (L) 10*6/mm3      Hemoglobin 10.7 (L) g/dL      Hematocrit 33.9 (L) %      MCV 80.3 fL      MCH 25.4 (L) pg      MCHC 31.6 (L) g/dL      RDW 14.3 %      RDW-SD 41.7 fl      MPV 10.8 fL      Platelets 233 10*3/mm3      Neutrophil % 80.7 (H) %      Lymphocyte % 14.5 (L) %       "Monocyte % 4.4 (L) %      Eosinophil % 0.4 %      Basophil % 0.0 %      Immature Grans % 0.2 %      Neutrophils, Absolute 6.48 10*3/mm3      Lymphocytes, Absolute 1.16 10*3/mm3      Monocytes, Absolute 0.35 10*3/mm3      Eosinophils, Absolute 0.03 10*3/mm3      Basophils, Absolute 0.00 10*3/mm3      Immature Grans, Absolute 0.02 10*3/mm3     Troponin [[id number]]  (Normal) Collected:  05/01/18 0041    Specimen:  Blood Updated:  05/01/18 0821     Troponin T 0.017 ng/mL     Narrative:       Troponin T Reference Ranges:  Less than 0.03 ng/mL:    Negative for AMI  0.03 to 0.09 ng/mL:      Indeterminant for AMI  Greater than 0.09 ng/mL: Positive for AMI    POC Glucose Once [[id number]]  (Abnormal) Collected:  05/01/18 0657    Specimen:  Blood Updated:  05/01/18 0723     Glucose 147 (H) mg/dL     Narrative:       Meter: [id number] : [id number] Legal Egg HETAL    POC Glucose Once [[id number]]  (Normal) Collected:  05/01/18 0319    Specimen:  Blood Updated:  05/01/18 0336     Glucose 114 mg/dL     Narrative:       Meter: [id number] : [id number] Spritzsha HETAL    Procalcitonin [[id number]]  (Abnormal) Collected:  05/01/18 0041    Specimen:  Blood Updated:  05/01/18 0209     Procalcitonin 0.05 (L) ng/mL     Narrative:       As a Marker for Sepsis (Non-Neonates):   1. <0.5 ng/mL represents a low risk of severe sepsis and/or septic shock.  1. >2 ng/mL represents a high risk of severe sepsis and/or septic shock.    As a Marker for Lower Respiratory Tract Infections that require antibiotic therapy:  PCT on Admission     Antibiotic Therapy             6-12 Hrs later  > 0.5                Strongly Recommended            >0.25 - <0.5         Recommended  0.1 - 0.25           Discouraged                   Remeasure/reassess PCT  <0.1                 Strongly Discouraged          Remeasure/reassess PCT      As 28 day mortality risk marker: \"Change in Procalcitonin Result\" (> 80 % or <=80 %) if Day 0 (or Day 1) and Day " 4 values are available. Refer to http://[email or web address]-pct-calculator.com/   Change in PCT <=80 %   A decrease of PCT levels below or equal to 80 % defines a positive change in PCT test result representing a higher risk for 28-day all-cause mortality of patients diagnosed with severe sepsis or septic shock.  Change in PCT > 80 %   A decrease of PCT levels of more than 80 % defines a negative change in PCT result representing a lower risk for 28-day all-cause mortality of patients diagnosed with severe sepsis or septic shock.                POC Glucose Once [[id number]]  (Abnormal) Collected:  05/01/18 0012    Specimen:  Blood Updated:  05/01/18 0024     Glucose 140 (H) mg/dL     Narrative:       Meter: [id number] : [id number] Sindy Laguerre RN    POC Glucose Once [[id number]]  (Abnormal) Collected:  04/30/18 2256    Specimen:  Blood Updated:  04/30/18 2257     Glucose 229 (H) mg/dL     Narrative:       Meter: [id number] : [id number] Luis Cardenas RN    Urine Drug Screen - Urine, Clean Catch [[id number]]  (Normal) Collected:  04/30/18 1748    Specimen:  Urine from Urine, Clean Catch Updated:  04/30/18 1831     Amphet/Methamphet, Screen Negative     Barbiturates Screen, Urine Negative     Benzodiazepine Screen, Urine Negative     Cocaine Screen, Urine Negative     Opiate Screen Negative     THC, Screen, Urine Negative     Methadone Screen, Urine Negative     Oxycodone Screen, Urine Negative    Narrative:       Negative Thresholds For Drugs Screened:     Amphetamines               500 ng/ml   Barbiturates               200 ng/ml   Benzodiazepines            100 ng/ml   Cocaine                    300 ng/ml   Methadone                  300 ng/ml   Opiates                    300 ng/ml   Oxycodone                  100 ng/ml   THC                        50 ng/ml    The Normal Value for all drugs tested is negative. This report includes final unconfirmed screening results to be used for medical treatment purposes only.  Unconfirmed results must not be used for non-medical purposes such as employment or legal testing. Clinical consideration should be applied to any drug of abuse test, particulary when unconfirmed results are used.    Ethanol [[id number]] Collected:  04/30/18 1744    Specimen:  Blood Updated:  04/30/18 1825     Ethanol <10 mg/dL      Ethanol % <0.010 %     CBC & Differential [[id number]] Collected:  04/30/18 1744    Specimen:  Blood Updated:  04/30/18 1817    Narrative:       The following orders were created for panel order CBC & Differential.  Procedure                               Abnormality         Status                     ---------                               -----------         ------                     CBC Auto Differential[[id number]]        Abnormal            Final result                 Please view results for these tests on the individual orders.    CBC Auto Differential [[id number]]  (Abnormal) Collected:  04/30/18 1744    Specimen:  Blood Updated:  04/30/18 1817     WBC 6.02 10*3/mm3      RBC 5.13 10*6/mm3      Hemoglobin 13.1 (L) g/dL      Hematocrit 41.4 %      MCV 80.7 fL      MCH 25.5 (L) pg      MCHC 31.6 (L) g/dL      RDW 14.1 %      RDW-SD 41.2 fl      MPV 11.3 fL      Platelets 285 10*3/mm3      Neutrophil % 62.2 %      Lymphocyte % 28.2 %      Monocyte % 6.8 %      Eosinophil % 2.8 %      Basophil % 0.0 %      Immature Grans % 0.0 %      Neutrophils, Absolute 3.74 10*3/mm3      Lymphocytes, Absolute 1.70 10*3/mm3      Monocytes, Absolute 0.41 10*3/mm3      Eosinophils, Absolute 0.17 10*3/mm3      Basophils, Absolute 0.00 10*3/mm3      Immature Grans, Absolute 0.00 10*3/mm3     Ketone Bodies, Serum (Not performed at [place]) [[id number]] Collected:  04/30/18 1744    Specimen:  Blood Updated:  04/30/18 1815    Narrative:       The following orders were created for panel order Ketone Bodies, Serum (Not performed at [place]).  Procedure                               Abnormality         Status                      ---------                               -----------         ------                     Beta Hydroxybutyrate Les...[[id number]]  Normal              Final result                 Please view results for these tests on the individual orders.    Beta Hydroxybutyrate Quantitative [[id number]]  (Normal) Collected:  04/30/18 1744    Specimen:  Blood Updated:  04/30/18 1815     Beta-Hydroxybutyrate Quant 0.090 mmol/L     Comprehensive Metabolic Panel [[id number]]  (Abnormal) Collected:  04/30/18 1744    Specimen:  Blood Updated:  04/30/18 1814     Glucose 368 (H) mg/dL      BUN 16 mg/dL      Creatinine 1.54 (H) mg/dL      Sodium 136 mmol/L      Potassium 4.4 mmol/L      Chloride 95 (L) mmol/L      CO2 28.9 mmol/L      Calcium 10.2 mg/dL      Total Protein 7.2 g/dL      Albumin 4.20 g/dL      ALT (SGPT) 12 U/L      AST (SGOT) 12 U/L      Alkaline Phosphatase 82 U/L      Total Bilirubin 0.3 mg/dL      eGFR Non African Amer 46 (L) mL/min/1.73      Globulin 3.0 gm/dL      A/G Ratio 1.4 g/dL      BUN/Creatinine Ratio 10.4     Anion Gap 12.1 mmol/L     Lactic Acid, Plasma [[id number]]  (Normal) Collected:  04/30/18 1744    Specimen:  Blood Updated:  04/30/18 1812     Lactate 1.9 mmol/L           .  Imaging Results (last 24 hours)     Procedure Component Value Units Date/Time    CT Head Without Contrast [[id number]] Collected:  04/30/18 2141     Updated:  05/01/18 1244    Narrative:       CT HEAD WITHOUT CONTRAST     HISTORY: Encephalopathy.     TECHNIQUE: A noncontrasted CT examination of the brain was performed.     FINDINGS: The brain and ventricles are symmetrical. There is no evidence  of intracranial hemorrhage, hydrocephalus or of abnormal extra-axial  fluid. No focal area of decreased attenuation to suggest acute  infarction is identified. Areas of decreased attenuation are appreciated  involving the white matter of the cerebral bilaterally, nonspecific, but  suggesting small vessels changes. Partially  calcified nodules are  identified involving the soft tissues of the scalp and the left parietal  region posterolaterally.       Impression:       No acute process identified. See above. Further evaluation  could be performed with a MRI examination of brain as indicated.                 Radiation dose reduction techniques were utilized, including automated  exposure control and exposure modulation based on body size.     This report was finalized on 5/1/2018 12:41 PM by Dr. Delmar Yuen MD.             For this problem, the following was ordered:  Orders Placed This Encounter   Procedures   • Critical Care   • CT Head Without Contrast   • Comprehensive Metabolic Panel   • Lactic Acid, Plasma   • CBC Auto Differential   • Beta Hydroxybutyrate Quantitative   • Ethanol   • Urine Drug Screen - Urine, Clean Catch   • Procalcitonin   • Troponin   • Basic Metabolic Panel   • CBC Auto Differential   • Troponin   • Troponin   • Troponin   • Diet Regular; Consistent Carbohydrate   • Vital Signs Every Hour and Per Hospital Policy Based on Patient Condition   • Cardiac Monitoring   • Continuous Pulse Oximetry   • Strict Bed Rest   • Use Mobility Guidelines for Advancement of Activity   • Height & Weight   • Daily Weights   • Intake and Output   • Saline Lock & Maintain IV Access   • Place Compression Stockings (TEDs)   • Remove & Replace Compression Stockings (TEDS) Daily   • Do NOT Hold Basal Insulin When Patient is NPO, Hold Bolus Dose if NPO   • Follow [facility] Hypoglycemia Protocol For Blood Glucose Less Than 70 mg/dL   • Hypoglycemia Treatment - Alert Patient That is Not NPO and Can Safely Swallow   • Hypoglycemia Treatment - Patient Has IV Access - Unresponsive, NPO or Unable To Safely Swallow   • Hypoglycemia Treatment - Patient Without IV Access - Unresponsive, NPO or Unable To Safely Swallow   • Notify Provider of event. Communicate needs and document in EMR.   • Document event and patients response to treatment in EMR,  any medications given to be documented on MAR.   • Full Code   • [facility] (on-call MD unless specified)   • Inpatient Cardiology Consult   • Inpatient Neurology Consult   • POC Glucose Once   • POC Glucose Once   • POC Glucose Once   • POC Glucose Once   • POC Glucose Once   • POC Glucose 4x Daily AC & at Bedtime   • POC Glucose Once   • ECG 12 Lead   • Insert peripheral IV   • Insert Peripheral IV   • Inpatient Admission   • Transfer Patient   • CBC & Differential   • Ketone Bodies, Serum (Not performed at [place])   • CBC & Differential       Problem List Items Addressed This Visit     Hypertensive encephalopathy syndrome - Primary      Other Visit Diagnoses     Hyperglycemia        History of medication noncompliance              ASSESSMENT/PLAN: This is a 62 y.o. male who has   Past Medical History:   Diagnosis Date   • Acute sinusitis    • Anemia    • Arthritis    • Cancer     skin   • Chronic ischemic heart disease    • Depression    • Diabetes mellitus type 2, uncontrolled, without complications    • Heart attack    • Hyperlipidemia    • Hypertension    • Screening for prostate cancer 2011   • Stroke    • TIA (transient ischemic attack)    • Type 2 diabetes mellitus    • Vitamin D deficiency     presents with Acute encephalopathy most likely secondary to uncontrolled hypertension and diabetes.  Cannot rule out acute CVA from noncompliance of his medications.    1.  Acute encephalopathy from uncontrolled hypertension and diabetes but would like Rule out CVA:  Brain MRI  Continue antiplatelet therapy and statins    2. At discharge Secondary stroke prophylaxis: Ideal targets for stroke prevention would be Blood pressure < 130/80; B12 > 500 TSH in normal range and LDL < 70; HbA1c < 6.5 and smoking cessation if applicable.    Will follow with the brain MRI results.    Thank you for allowing us to participate in the care of this patient.    Noelle Reddy MD  05/01/18  3:45 PM

## 2018-05-01 NOTE — ED NOTES
Pt yelling and becoming increasingly anxious. After multiple attempts to calm pt and redirect pt continues to  Mower and swing arms at staff while they are attempting to provide care. When spoken to, pt will answer questions in complete and coherent sentences before returning to yelling and being non cooperative with care. MD Marquez notified and order for ativan placed.      Ellie Carrero RN  04/30/18 [id number]

## 2018-05-01 NOTE — ED NOTES
RN witness pt tear off brief and throw in floor. Bed soaked in urine. Bed cleaned and linins changed. Pt still answering questions and obeying few commands when addressed directly.     Ellie Carrero RN  04/30/18 [id number]

## 2018-05-02 LAB
ANION GAP SERPL CALCULATED.3IONS-SCNC: 10.7 MMOL/L
BASOPHILS # BLD AUTO: 0 10*3/MM3 (ref 0–0.2)
BASOPHILS NFR BLD AUTO: 0 % (ref 0–1.5)
BUN BLD-MCNC: 21 MG/DL (ref 8–23)
BUN/CREAT SERPL: 14.8 (ref 7–25)
CALCIUM SPEC-SCNC: 8.7 MG/DL (ref 8.6–10.5)
CHLORIDE SERPL-SCNC: 103 MMOL/L (ref 98–107)
CHOLEST SERPL-MCNC: 275 MG/DL (ref 0–200)
CO2 SERPL-SCNC: 27.3 MMOL/L (ref 22–29)
CREAT BLD-MCNC: 1.42 MG/DL (ref 0.76–1.27)
DEPRECATED RDW RBC AUTO: 43.1 FL (ref 37–54)
EOSINOPHIL # BLD AUTO: 0.12 10*3/MM3 (ref 0–0.7)
EOSINOPHIL NFR BLD AUTO: 1.4 % (ref 0.3–6.2)
ERYTHROCYTE [DISTWIDTH] IN BLOOD BY AUTOMATED COUNT: 14.5 % (ref 11.5–14.5)
GFR SERPL CREATININE-BSD FRML MDRD: 51 ML/MIN/1.73
GLUCOSE BLD-MCNC: 107 MG/DL (ref 65–99)
GLUCOSE BLDC GLUCOMTR-MCNC: 103 MG/DL (ref 70–130)
GLUCOSE BLDC GLUCOMTR-MCNC: 107 MG/DL (ref 70–130)
GLUCOSE BLDC GLUCOMTR-MCNC: 180 MG/DL (ref 70–130)
GLUCOSE BLDC GLUCOMTR-MCNC: 222 MG/DL (ref 70–130)
HBA1C MFR BLD: 10.6 % (ref 4.8–5.6)
HCT VFR BLD AUTO: 35 % (ref 40.4–52.2)
HDLC SERPL-MCNC: 50 MG/DL (ref 40–60)
HGB BLD-MCNC: 10.8 G/DL (ref 13.7–17.6)
IMM GRANULOCYTES # BLD: 0.02 10*3/MM3 (ref 0–0.03)
IMM GRANULOCYTES NFR BLD: 0.2 % (ref 0–0.5)
LDLC SERPL CALC-MCNC: 209 MG/DL (ref 0–100)
LDLC/HDLC SERPL: 4.18 {RATIO}
LYMPHOCYTES # BLD AUTO: 1.46 10*3/MM3 (ref 0.9–4.8)
LYMPHOCYTES NFR BLD AUTO: 16.8 % (ref 19.6–45.3)
MCH RBC QN AUTO: 25.2 PG (ref 27–32.7)
MCHC RBC AUTO-ENTMCNC: 30.9 G/DL (ref 32.6–36.4)
MCV RBC AUTO: 81.6 FL (ref 79.8–96.2)
MONOCYTES # BLD AUTO: 0.58 10*3/MM3 (ref 0.2–1.2)
MONOCYTES NFR BLD AUTO: 6.7 % (ref 5–12)
NEUTROPHILS # BLD AUTO: 6.53 10*3/MM3 (ref 1.9–8.1)
NEUTROPHILS NFR BLD AUTO: 74.9 % (ref 42.7–76)
PLATELET # BLD AUTO: 240 10*3/MM3 (ref 140–500)
PMV BLD AUTO: 11.1 FL (ref 6–12)
POTASSIUM BLD-SCNC: 3.9 MMOL/L (ref 3.5–5.2)
RBC # BLD AUTO: 4.29 10*6/MM3 (ref 4.6–6)
SODIUM BLD-SCNC: 141 MMOL/L (ref 136–145)
TRIGL SERPL-MCNC: 81 MG/DL (ref 0–150)
TROPONIN T SERPL-MCNC: 0.04 NG/ML (ref 0–0.03)
VLDLC SERPL-MCNC: 16.2 MG/DL (ref 5–40)
WBC NRBC COR # BLD: 8.71 10*3/MM3 (ref 4.5–10.7)

## 2018-05-02 PROCEDURE — 80048 BASIC METABOLIC PNL TOTAL CA: CPT | Performed by: INTERNAL MEDICINE

## 2018-05-02 PROCEDURE — 94760 N-INVAS EAR/PLS OXIMETRY 1: CPT

## 2018-05-02 PROCEDURE — 80061 LIPID PANEL: CPT | Performed by: PSYCHIATRY & NEUROLOGY

## 2018-05-02 PROCEDURE — 83036 HEMOGLOBIN GLYCOSYLATED A1C: CPT | Performed by: PSYCHIATRY & NEUROLOGY

## 2018-05-02 PROCEDURE — 92610 EVALUATE SWALLOWING FUNCTION: CPT

## 2018-05-02 PROCEDURE — 25010000002 ENOXAPARIN PER 10 MG: Performed by: INTERNAL MEDICINE

## 2018-05-02 PROCEDURE — 97161 PT EVAL LOW COMPLEX 20 MIN: CPT

## 2018-05-02 PROCEDURE — 97165 OT EVAL LOW COMPLEX 30 MIN: CPT | Performed by: OCCUPATIONAL THERAPIST

## 2018-05-02 PROCEDURE — 63710000001 INSULIN ASPART PER 5 UNITS: Performed by: INTERNAL MEDICINE

## 2018-05-02 PROCEDURE — 99232 SBSQ HOSP IP/OBS MODERATE 35: CPT | Performed by: NURSE PRACTITIONER

## 2018-05-02 PROCEDURE — 92523 SPEECH SOUND LANG COMPREHEN: CPT

## 2018-05-02 PROCEDURE — 82962 GLUCOSE BLOOD TEST: CPT

## 2018-05-02 PROCEDURE — 99232 SBSQ HOSP IP/OBS MODERATE 35: CPT | Performed by: INTERNAL MEDICINE

## 2018-05-02 PROCEDURE — 94799 UNLISTED PULMONARY SVC/PX: CPT

## 2018-05-02 PROCEDURE — 84484 ASSAY OF TROPONIN QUANT: CPT | Performed by: INTERNAL MEDICINE

## 2018-05-02 PROCEDURE — 85025 COMPLETE CBC W/AUTO DIFF WBC: CPT | Performed by: INTERNAL MEDICINE

## 2018-05-02 PROCEDURE — 97110 THERAPEUTIC EXERCISES: CPT

## 2018-05-02 RX ORDER — CHOLECALCIFEROL (VITAMIN D3) 125 MCG
1000 CAPSULE ORAL DAILY
Status: DISCONTINUED | OUTPATIENT
Start: 2018-05-02 | End: 2018-05-04 | Stop reason: HOSPADM

## 2018-05-02 RX ADMIN — AMLODIPINE BESYLATE 5 MG: 5 TABLET ORAL at 09:09

## 2018-05-02 RX ADMIN — ATORVASTATIN CALCIUM 80 MG: 80 TABLET, FILM COATED ORAL at 09:08

## 2018-05-02 RX ADMIN — METOPROLOL TARTRATE 100 MG: 100 TABLET ORAL at 20:40

## 2018-05-02 RX ADMIN — CLOPIDOGREL 75 MG: 75 TABLET, FILM COATED ORAL at 09:08

## 2018-05-02 RX ADMIN — METOPROLOL TARTRATE 100 MG: 100 TABLET ORAL at 09:09

## 2018-05-02 RX ADMIN — INSULIN DETEMIR 10 UNITS: 100 INJECTION, SOLUTION SUBCUTANEOUS at 22:01

## 2018-05-02 RX ADMIN — LEVOTHYROXINE SODIUM 50 MCG: 50 TABLET ORAL at 05:53

## 2018-05-02 RX ADMIN — ENOXAPARIN SODIUM 40 MG: 40 INJECTION SUBCUTANEOUS at 11:52

## 2018-05-02 RX ADMIN — DOCUSATE SODIUM 100 MG: 100 CAPSULE, LIQUID FILLED ORAL at 09:08

## 2018-05-02 RX ADMIN — INSULIN ASPART 2 UNITS: 100 INJECTION, SOLUTION INTRAVENOUS; SUBCUTANEOUS at 11:53

## 2018-05-02 RX ADMIN — INSULIN ASPART 4 UNITS: 100 INJECTION, SOLUTION INTRAVENOUS; SUBCUTANEOUS at 18:16

## 2018-05-02 RX ADMIN — Medication 1000 MCG: at 18:17

## 2018-05-02 RX ADMIN — ASPIRIN 81 MG: 81 TABLET ORAL at 09:08

## 2018-05-02 NOTE — PLAN OF CARE
Problem: Patient Care Overview  Goal: Plan of Care Review  Outcome: Ongoing (interventions implemented as appropriate)      Problem: Patient Care Overview  Goal: Plan of Care Review  Outcome: Ongoing (interventions implemented as appropriate)   05/02/18 9492   Coping/Psychosocial   Plan of Care Reviewed With patient   OTHER   Outcome Summary pt presents with gait instability, very ataxic gait, he will benefit from PT to address for safe d/c home.

## 2018-05-02 NOTE — PLAN OF CARE
Problem: Fall Risk (Adult)  Goal: Identify Related Risk Factors and Signs and Symptoms  Outcome: Outcome(s) achieved Date Met: 05/02/18 05/02/18 0903   Fall Risk (Adult)   Related Risk Factors (Fall Risk) age-related changes;confusion/agitation;culprit medication(s);depression/anxiety;fatigue/slow reaction;fear of falling;gait/mobility problems;impaired vision;inadequate lighting;neuro disease/injury;objects hard to reach;polypharmacy;sensory deficits;slippery/uneven surfaces;environment unfamiliar   Signs and Symptoms (Fall Risk) presence of risk factors     Goal: Absence of Fall  Outcome: Ongoing (interventions implemented as appropriate)   05/02/18 0903   Fall Risk (Adult)   Absence of Fall making progress toward outcome       Problem: Skin Injury Risk (Adult)  Goal: Identify Related Risk Factors and Signs and Symptoms  Outcome: Outcome(s) achieved Date Met: 05/02/18 05/02/18 0903   Skin Injury Risk (Adult)   Related Risk Factors (Skin Injury Risk) advanced age;cognitive impairment;critical care admission;fluid intake inadequate;mechanical forces;medication;mobility impaired;nutritional deficiencies;tissue perfusion altered     Goal: Skin Health and Integrity  Outcome: Ongoing (interventions implemented as appropriate)   05/02/18 0903   Skin Injury Risk (Adult)   Skin Health and Integrity making progress toward outcome       Problem: Hypertensive Disease/Crisis (Arterial) (Adult)  Goal: Signs and Symptoms of Listed Potential Problems Will be Absent, Minimized or Managed (Hypertensive Disease/Crisis)  Outcome: Ongoing (interventions implemented as appropriate)   05/02/18 0903   Goal/Outcome Evaluation   Problems Assessed (Hypertensive Disease/Crisis (Arterial)) all   Problems Present (Hypertensive Disease) neurologic deterioration;situational response       Problem: Confusion, Acute (Adult)  Goal: Identify Related Risk Factors and Signs and Symptoms  Outcome: Outcome(s) achieved Date Met: 05/02/18 05/02/18  0903   Confusion, Acute (Adult)   Related Risk Factors (Acute Confusion) advanced age;cognitive impairment;dehydration;hypoxemia;infection;malnutrition;medication effects;metabolic abnormalities;mobility decreased;neurological impairment;pain;polypharmacy;sensory deprivation;sleep disturbance;substance use/abuse;urinary retention   Signs and Symptoms (Acute Confusion) acute onset of symptoms;communication disturbed;disorientation;emotional/behavioral disturbances;memory disturbed;perceptions disturbed;reasoning/planning disturbed;thought process diminished/disorganized;understanding disturbed     Goal: Cognitive/Functional Impairments Minimized  Outcome: Ongoing (interventions implemented as appropriate)   05/02/18 0903   Confusion, Acute (Adult)   Cognitive/Functional Impairments Minimized making progress toward outcome     Goal: Safety  Outcome: Ongoing (interventions implemented as appropriate)   05/02/18 0903   Confusion, Acute (Adult)   Safety making progress toward outcome       Problem: Patient Care Overview  Goal: Plan of Care Review   05/02/18 0903   Coping/Psychosocial   Plan of Care Reviewed With patient   Coping/Psychosocial   Patient Agreement with Plan of Care agrees   Plan of Care Review   Progress no change   OTHER   Outcome Summary sleeping, awakens to voice, alert with some confusion. denies pain, no distress noted, vss, meds administered. nih assessed. will continue to monitor.

## 2018-05-02 NOTE — PLAN OF CARE
Problem: Patient Care Overview  Goal: Plan of Care Review   05/02/18 1529   Coping/Psychosocial   Plan of Care Reviewed With patient   Plan of Care Review   Progress no change   OTHER   Outcome Summary pt evaluated for OT. pt was SBA w. tsf, SBA w. LBD socks. UE ROM is 8/8. FMC is good w. opposition. Pt states he showered this am but per nsg he did not, need to assess ADLs to determine independence level w. self-care and if he has further OT needs self-care wise.

## 2018-05-02 NOTE — DISCHARGE INSTRUCTIONS
Ideal targets for stroke prevention would be :  Blood pressure < 130/80; B12 > 500 TSH in normal range and LDL < 70; HbA1c < 6.5 and smoking cessation if applicable.

## 2018-05-02 NOTE — PROGRESS NOTES
Jorge Shukla MD                          [phone]      Patient ID:    Name:  Carlito Mo    MRN:  [id number]    1955   62 y.o.  male            Patient Care Team:  García Rehman MD as PCP - General (Family Medicine)  García Rehman MD as PCP - Family Medicine      CC/Reason for visit:     Subjective: Pt seen and examined this AM. No acute overnight events noted. Doing better.     ROS:   Denies any fevers/ chills/ CP/ palpitations/ Nausea/ vomiting/ Diarrhoea  No Worsening cough or SOA.     Objective     Vital Signs past 24hrs    BP range: BP: (107-143)/(59-87) 126/68  Pulse range: Heart Rate:  [56-76] 60  Resp rate range: Resp:  [16] 16  Temp range: Temp (24hrs), Av.2 °F (36.8 °C), Min:97.9 °F (36.6 °C), Max:98.5 °F (36.9 °C)      Ventilator/Non-Invasive Ventilation Settings     None          Device (Oxygen Therapy): room air       80.4 kg (177 lb 4 oz); Body mass index is 24.04 kg/m².      Intake/Output Summary (Last 24 hours) at 18 1507  Last data filed at 18 1326   Gross per 24 hour   Intake              920 ml   Output              200 ml   Net              720 ml       Exam:    GEN:  No respiratory distress, appears stated age  EYES:   EOM-i, anicteric sclera bilat  ENT:    External ears/nose normal, OP clear  NECK:  Supple, midline trachea, No JVD or cervical LApathy  LUNGS: Bilateral breath sounds heard, Dec BS @ bases  CV:  Regular rate and rhythm, No murmur  ABD:  Non tender, no distended, no palpable liver or masses  EXT:  No cyanosis or clubbing, No peripheral/sacral edema    Scheduled meds:    amLODIPine 5 mg Oral Daily   aspirin 81 mg Oral Daily   atorvastatin 80 mg Oral Daily   clopidogrel 75 mg Oral Daily   docusate sodium 100 mg Oral Daily   enoxaparin 40 mg Subcutaneous Q24H   insulin aspart 0-9 Units Subcutaneous 4x Daily With Meals & Nightly   insulin detemir 10 Units Subcutaneous Nightly   levothyroxine 50 mcg Oral Q AM   metoprolol  tartrate 100 mg Oral Q12H   vitamin B-12 1,000 mcg Oral Daily       IV meds:                        niCARdipine 5-15 mg/hr Last Rate: Stopped (05/01/18 0042)       Data Review:        Results from last 7 days  Lab Units 05/02/18  0454 05/01/18  0910 05/01/18  0041 04/30/18  1744   SODIUM mmol/L 141 139  --  136   POTASSIUM mmol/L 3.9 4.0  --  4.4   CHLORIDE mmol/L 103 101  --  95*   CO2 mmol/L 27.3 25.1  --  28.9   BUN mg/dL 21 16  --  16   CREATININE mg/dL 1.42* 1.09  --  1.54*   CALCIUM mg/dL 8.7 8.5*  --  10.2   BILIRUBIN mg/dL  --   --   --  0.3   ALK PHOS U/L  --   --   --  82   ALT (SGPT) U/L  --   --   --  12   AST (SGOT) U/L  --   --   --  12   GLUCOSE mg/dL 107* 179*  --  368*   WBC 10*3/mm3 8.71 8.02  --  6.02   HEMOGLOBIN g/dL 10.8* 10.7*  --  13.1*   PLATELETS 10*3/mm3 240 233  --  285   PROCALCITONIN ng/mL  --   --  0.05*  --        Lab Results   Component Value Date    CALCIUM 8.7 05/02/2018    PHOS 4.9 (H) 02/18/2018               Results from last 7 days  Lab Units 05/02/18  0454 05/01/18  1431 05/01/18  0910 05/01/18 0041   TROPONIN T ng/mL 0.036* 0.040* 0.032* 0.017       Results Review:    I have reviewed the available laboratory results and reviewed the chest imaging personally    Imaging Results (all)     Procedure Component Value Units Date/Time    MRI Brain Without Contrast [[id number]] Collected:  05/01/18 1914     Updated:  05/01/18 1930    Narrative:       MRI BRAIN WITHOUT CONTRAST     HISTORY: 62-year-old male with agitation. Patient is combative and  confused.     TECHNIQUE: Brain MRI without contrast includes sagittal T1 as well as  axial diffusion, FLAIR, T1, T2, gradient echo sequences.     COMPARISON: Head CT without contrast 04/30/2018, MRI brain without  contrast 02/17/2018, 12/02/2017.     FINDINGS: Within the central superior left frontal lobe white matter  there is an 8 mm focus of restricted diffusion that is new when compared  to the previous MRI of 02/17/2018. Within the left  superior, medial  occipital lobe there is a 5 mm focus restricted diffusion consistent  with an acute infarction. Within the right inferolateral parietal lobe  there is a 5 mm focus restricted diffusion consistent with an acute  infarction. There has been continued evolution of the infarction  demonstrated to be acute on previous brain MRI 02/17/2018. Within the  medial right cerebellar hemisphere there is a very faint focus  restricted diffusion measuring 6 mm consistent with a subacute  infarction. This is anterior to a chronic area of encephalomalacia that  measures 10 mm consistent with a chronic infarction. Additionally,  within the left parietal lobe there is a small cortical area of faint  restricted diffusion consistent with subacute infarction. There is  extensive confluent periventricular and patchy deep cerebral white  matter FLAIR hyperintense signal that is consistent with chronic small  vessel ischemic white matter change. There is no evidence for mass  effect or shift of midline structures. Ventricles are not changed in  size or configuration. No extra-axial fluid collection is evident.     Two small left parietal and single right frontal scalp and right  posterior suboccipital nodules are likely sebaceous cyst and are without  change. Mastoid air cells appear clear. A right maxillary sinus mucus  retention cyst measures 17 x 13 mm.       Impression:       1. Subcentimeter acute bilateral cerebral hemisphere infarctions. There  are additional cerebral and cerebellar infarctions of various ages with  some appearing subacute and some chronic and there has been evolution of  the small infarctions that were demonstrated to be acute on previous  brain MRI 02/17/2018. Infarcts are in various vascular territories  supporting the presence of embolic phenomena.  2. Chronic small vessel ischemic white matter change similar to the  previous exam.     Discussed with Ellie, nurse caring for the patient on  05/01/2018 at 7  PM.     This report was finalized on 5/1/2018 7:27 PM by Dr. Keon Santoyo M.D..       CT Head Without Contrast [[id number]] Collected:  04/30/18 2141     Updated:  05/01/18 1244    Narrative:       CT HEAD WITHOUT CONTRAST     HISTORY: Encephalopathy.     TECHNIQUE: A noncontrasted CT examination of the brain was performed.     FINDINGS: The brain and ventricles are symmetrical. There is no evidence  of intracranial hemorrhage, hydrocephalus or of abnormal extra-axial  fluid. No focal area of decreased attenuation to suggest acute  infarction is identified. Areas of decreased attenuation are appreciated  involving the white matter of the cerebral bilaterally, nonspecific, but  suggesting small vessels changes. Partially calcified nodules are  identified involving the soft tissues of the scalp and the left parietal  region posterolaterally.       Impression:       No acute process identified. See above. Further evaluation  could be performed with a MRI examination of brain as indicated.                 Radiation dose reduction techniques were utilized, including automated  exposure control and exposure modulation based on body size.     This report was finalized on 5/1/2018 12:41 PM by Dr. Delmar Yuen MD.               ASSESSMENT:  Acute on chronic b/l hemispheric CVA   Hypertensive encephalopathy  Hyperglycemia  Medical noncompliance  SYLVAIN on CKD  H/o cardioembolic CVA(12/17)  CAD status post CABG in 2001    PLAN:  Noted for an from cardiology and neurology  Cardiology recommends full anticoagulation but neurology has recommended only dual antiplatelet therapy.  No definitive cardioembolic source. A. fibrillation ruled out with Ziopatch.  We will await final consensus  Had a long discussion with the patient regarding his noncompliance with medications.  He says that it is because his time is taken away by his son who has Asperger syndrome.  I have explained to him the importance of him being  healthy for him to be taking care of a sick child.  He understands and promises to be more compliant  PT ordered.   Taking diet   Statin ordered.   Full code   D/c soon based on PT assessment and final AC plans.    I have discussed my findings and recommendations with patient and nursing staff.     Jorge Shukla MD  5/2/2018

## 2018-05-02 NOTE — THERAPY EVALUATION
Acute Care - Speech Language Pathology Initial Evaluation   [facility]     Patient Name: Carlito Mo  : 1955  MRN: [id number]  Today's Date: 2018               Admit Date: 2018     Visit Dx:    ICD-10-CM ICD-9-CM   1. Hypertensive encephalopathy syndrome I67.4 437.2   2. Hyperglycemia R73.9 790.29   3. History of medication noncompliance Z91.14 V15.81     Patient Active Problem List   Diagnosis   • Depression   • Hyperlipidemia   • Chronic ischemic heart disease   • Vitamin D deficiency   • Erectile dysfunction   • Chronic fatigue   • Diabetes mellitus   • Skin lesion of chest wall   • Slow transit constipation   • Atherosclerosis of coronary artery   • Benign prostatic hyperplasia   • Chronic kidney disease   • History of basal cell carcinoma   • Essential hypertension   • Acquired hypothyroidism   • Hypertensive urgency   • Cerebrovascular accident (CVA) due to embolism of precerebral artery   • Hypertensive encephalopathy syndrome     Past Medical History:   Diagnosis Date   • Acute sinusitis    • Anemia    • Arthritis    • Cancer     skin   • Chronic ischemic heart disease    • Depression    • Diabetes mellitus type 2, uncontrolled, without complications    • Heart attack    • Hyperlipidemia    • Hypertension    • Screening for prostate cancer    • Stroke    • TIA (transient ischemic attack)    • Type 2 diabetes mellitus    • Vitamin D deficiency      Past Surgical History:   Procedure Laterality Date   • APPENDECTOMY N/A 2016    Dr. Alexey Dodson   • CORONARY ARTERY BYPASS GRAFT  2001          SLP EVALUATION (last 72 hours)      SLP SLC Evaluation     Row Name 18 1000                   Communication Assessment/Intervention    Document Type evaluation  -SH        Patient Observations alert;cooperative  -SH        Patient Effort excellent  -SH           General Information    Patient Profile Reviewed yes  -SH        Pertinent History Of Current Problem acute bilateral  strokes as well as chronic and subacute  -        Plans/Goals Discussed with patient  -        Barriers to Rehab cognitive status  -        Patient's Goals for Discharge patient did not state  -           Pain Assessment    Additional Documentation Pain Scale: FACES Pre/Post-Treatment (Group)  -           Pain Scale: Numbers Pre/Post-Treatment    Pain Scale: Numbers, Pretreatment 0/10 - no pain  -           Comprehension Assessment/Intervention    Comprehension Assessment/Intervention Auditory Comprehension  -           Auditory Comprehension Assessment/Intervention    Auditory Comprehension (Communication) mild impairment;other (see comments)   suspect deficits related to short term memory deficits  -        Able to Identify Objects/Pictures (Communication) body part;familiar objects;pictures of common objects;WFL  -        Answers Questions (Communication) yes/no;wh questions;personal;simple;L  -        Able to Follow Commands (Communication) 1-step;2-step;WFL;3-step;mild impairment  -        Successful Auditory Strategies (Communication) repetition  -        Auditory Comprehension Communication, Comment SLP questions short term memory. Will follow for cognitive assessment.  -           Expression Assessment/Intervention    Expression Assessment/Intervention verbal expression  -           Verbal Expression Assessment/Intervention    Verbal Expression mild impairment;other (see comments)   suspect 2/2 short term memory deficits   -        Automatic Speech (Communication) response to greeting;alphabet;counting 1-20;days of week;WFL  -        Repetition sounds;words;phrases;WFL;sentences;mild impairment  -        Phrase Completion automatic/predictable;unpredictable;L  -SH        Responsive Naming simple;complex;WFL  -SH        Confrontational Naming high frequency;low frequency;L  -        Spontaneous/Functional Words simple;complex;L  -SH        Sentence Formulation  simple;WFL  -        Conversational Discourse/Fluency WFL  -        Verbal Expression, Comment Pt presents with errors with repetition with sentences. SLP suspects 2/2 to cognitive deficits. Will follow for cognitive evaluation.   -           Oral Motor Structure and Function    Dentition Assessment natural, present and adequate  -           Motor Speech Assessment/Intervention    Motor Speech Function mild impairment  -        Characteristics Consistent with Dysarthria decreased articulation;slurred speech  -        Initiation of Phonation (Communication) voluntary;involuntary - (e.g. sneezing, laughing, yawning);WFL  -        Automatic Speech (Communication) response to greeting;alphabet;counting 1-20;days of week;mild impairment;other (see comments)   mild imprecisions  -        Verbal Repetition (Communication) monosyllabic words;polysyllabic words;words of increasing length;phrases;WFL;sentences;mild impairment  -        Phase Completion automatic/predictable;unpredictable;WFL  -SH        Conversational Speech (Communication) simple;mild impairment;other (see comments)   mild consonant imprecisions  -        Motor Speech, Comment Pt presents with mild dysarthria in conversation and with diadochokinetic speech tasks  -           SLP Clinical Impressions    SLP Diagnosis Mild dysarthria.   -        Rehab Potential/Prognosis excellent  -        Criteria for Skilled Therapy Interventions Met yes  -        Functional Impact no impact on function  -           Recommendations    Therapy Frequency ([facility] SLC) PRN  -        Predicted Duration Therapy Intervention (Days) until discharge  -        Anticipated Dischage Disposition unknown  -           Communication Treatment Objective and Progress Goals (SLP)    Motor Speech/Dysarthria Treatment Objectives Motor Speech/Dysarthria Treatment Objectives (Group)  -           Motor Speech/Dysarthria Treatment Objectives    Articulation  Selection articulation, SLP goal 1  -           Articulation Goal 1 (SLP)    Improve Articulation Goal 1 (SLP) by over-articulating at word level;by over-articulating at phrase level;by over-articulating in connected speech;90%;independently (over 90% accuracy)  -[facility] Discharge Summary    Discharge Destination unknown  -          User Key  (r) = Recorded By, (t) = Taken By, (c) = Cosigned By    Initials Name Effective Dates     Amanda Gan MS CCC-SLP 03/07/18 -                EDUCATION  The patient has been educated in the following areas:     Communication Impairment.    SLP Recommendation and Plan    SLP Diagnosis: Mild dysarthria.     Rehab Potential/Prognosis: excellent    Criteria for Skilled Therapeutic Interventions Met: demonstrates skilled criteria  Criteria for Skilled Therapy Interventions Met: yes    Anticipated Dischage Disposition: unknown         Therapy Frequency (Swallow): PRN    Predicted Duration Therapy Intervention (Days): until discharge          Plan of Care Review  Plan of Care Reviewed With: patient  Plan of Care Reviewed With: patient  Progress: improving  Outcome Summary: Bedside swallow revealed functional swallow. Pt exhibiting mild dysarthia and c/o cognitive changes, unsure if acute. Pt did exhibit errors with sentence repetitions and 3 step commands. SLP suspects memory deficit. Will follow for cog eval.               SLP GOALS     Row Name 05/02/18 1100 05/02/18 1000          Oral Nutrition/Hydration Goal 1 (SLP)    Oral Nutrition/Hydration Goal 1, SLP Pt will tolerate regular and thins without overt s/s of asp.   -  --        Articulation Goal 1 (SLP)    Improve Articulation Goal 1 (SLP)  -- by over-articulating at word level;by over-articulating at phrase level;by over-articulating in connected speech;90%;independently (over 90% accuracy)  -       User Key  (r) = Recorded By, (t) = Taken By, (c) = Cosigned By    Initials Name Provider Type    OMID SIMONS  MS Malik CCC-SLP Speech and Language Pathologist                 SLP Outcome Measures (last 72 hours)      SLP Outcome Measures     Row Name 05/02/18 1100             SLP Outcome Measures    Outcome Measure Used? Adult NOMS  -         FCM Scores    FCM Chosen Motor Speech  -      Motor Speech FCM Score 6  -SH        User Key  (r) = Recorded By, (t) = Taken By, (c) = Cosigned By    Initials Name Effective Dates     Amanda SIMONS MS FAISAL Gan-SLP 03/07/18 -               Time Calculation:           Time Calculation- SLP     Row Name 05/02/18 1114             Time Calculation- SLP    SLP Start Time 1000  -      SLP Received On 05/02/18  -        User Key  (r) = Recorded By, (t) = Taken By, (c) = Cosigned By    Initials Name Provider Type     Amanda KRISTYN Gan MS CCC-SLP Speech and Language Pathologist          Therapy Charges for Today     Code Description Service Date Service Provider Modifiers Qty    18007866601 HC ST EVAL ORAL PHARYNG SWALLOW 3 5/2/2018 Amanda Gan MS CCC-SLP GN 1    [id number] HC ST EVAL SPEECH AND PROD W LANG  3 5/2/2018 Amanda A MS FAISAL Gan-SLP GN 1             ADULT NOMS (last 72 hours)      Adult NOMS     Row Name 05/02/18 1100                   FCM Scores    FCM Chosen Motor Speech  -        Motor Speech FCM Score 6  -SH          User Key  (r) = Recorded By, (t) = Taken By, (c) = Cosigned By    Initials Name Effective Dates     Amanda A MS FAISAL Gan-SLP 03/07/18 -                MS RADHA Jesus  5/2/2018

## 2018-05-02 NOTE — PROGRESS NOTES
Discharge Planning Assessment   [facility]     Patient Name: Carlito Mo  MRN: [id number]  Today's Date: 5/2/2018    Admit Date: 4/30/2018          Discharge Needs Assessment     Row Name 05/02/18 2541       Living Environment    Lives With spouse;child(carlos), dependent    Current Living Arrangements home/apartment/condo    Potentially Unsafe Housing Conditions other (see comments)   no concerns     Primary Care Provided by self    Provides Primary Care For child(carlos)    Family Caregiver if Needed spouse    Family Caregiver Names Sofiya Mo     Quality of Family Relationships involved;helpful;supportive    Able to Return to Prior Arrangements yes       Resource/Environmental Concerns    Resource/Environmental Concerns none    Transportation Concerns car, none       Transition Planning    Patient/Family Anticipates Transition to home with help/services    Patient/Family Anticipated Services at Transition none    Transportation Anticipated family or friend will provide       Discharge Needs Assessment    Readmission Within the Last 30 Days no previous admission in last 30 days    Concerns to be Addressed no discharge needs identified;denies needs/concerns at this time    Equipment Currently Used at Home none    Anticipated Changes Related to Illness none    Equipment Needed After Discharge none            Discharge Plan     Row Name 05/02/18 2324       Plan    Plan Home with HH/outpatient physical therapy     Patient/Family in Agreement with Plan yes    Plan Comments CCP met with patient at bedside. CCP role explained and discharge planning discussed. Face sheet verified. Patient does not have a POA/living will. Patient's emergency contact is Sofiya Mo [phone]. Patient's PCP is Dr. Rehman. Patient has three steps to the entrance of the home. Patient does have grab bars present in his bathroom. Patient has not used home health or been to SNF. Patient uses [facility] on [address]; patient denies any trouble  remembering to take his medications. Patient states his medications are expensive at times. CCP will follow for HH choice or outpatient PT choice. Glenys GUERRIER         Destination     No service coordination in this encounter.      Durable Medical Equipment     No service coordination in this encounter.      Dialysis/Infusion     No service coordination in this encounter.      Home Medical Care     No service coordination in this encounter.      Social Care     No service coordination in this encounter.                Demographic Summary     Row Name 05/02/18 1643       General Information    Admission Type inpatient    Arrived From emergency department    Referral Source admission list    Reason for Consult discharge planning    Preferred Language English     Used During This Interaction no            Functional Status     Row Name 05/02/18 1643       Functional Status    Usual Activity Tolerance good    Current Activity Tolerance moderate       Functional Status, IADL    Medications independent    Meal Preparation independent    Housekeeping independent    Laundry independent    Shopping independent       Mental Status    General Appearance WDL WDL       Mental Status Summary    Recent Changes in Mental Status/Cognitive Functioning no changes            Psychosocial    No documentation.           Abuse/Neglect    No documentation.           Legal    No documentation.           Substance Abuse    No documentation.           Patient Forms    No documentation.         FAREED Bullock

## 2018-05-02 NOTE — THERAPY EVALUATION
Acute Care - Physical Therapy Initial Evaluation   [facility]     Patient Name: Carlito Mo  : 1955  MRN: [id number]  Today's Date: 2018   Onset of Illness/Injury or Date of Surgery: 18  Date of Referral to PT: 18  Referring Physician: Maureen      Admit Date: 2018    Visit Dx:     ICD-10-CM ICD-9-CM   1. Hypertensive encephalopathy syndrome I67.4 437.2   2. Hyperglycemia R73.9 790.29   3. History of medication noncompliance Z91.14 V15.81   4. Difficulty walking R26.2 719.7     Patient Active Problem List   Diagnosis   • Depression   • Hyperlipidemia   • Chronic ischemic heart disease   • Vitamin D deficiency   • Erectile dysfunction   • Chronic fatigue   • Diabetes mellitus   • Skin lesion of chest wall   • Slow transit constipation   • Atherosclerosis of coronary artery   • Benign prostatic hyperplasia   • Chronic kidney disease   • History of basal cell carcinoma   • Essential hypertension   • Acquired hypothyroidism   • Hypertensive urgency   • Cerebrovascular accident (CVA) due to embolism of precerebral artery   • Hypertensive encephalopathy syndrome     Past Medical History:   Diagnosis Date   • Acute sinusitis    • Anemia    • Arthritis    • Cancer     skin   • Chronic ischemic heart disease    • Depression    • Diabetes mellitus type 2, uncontrolled, without complications    • Heart attack    • Hyperlipidemia    • Hypertension    • Screening for prostate cancer    • Stroke    • TIA (transient ischemic attack)    • Type 2 diabetes mellitus    • Vitamin D deficiency      Past Surgical History:   Procedure Laterality Date   • APPENDECTOMY N/A 2016    Dr. Alexey Dodson   • CORONARY ARTERY BYPASS GRAFT  2001        PT ASSESSMENT (last 12 hours)      Physical Therapy Evaluation     Row Name 18 1448          PT Evaluation Time/Intention    Subjective Information no complaints  -PC     Document Type evaluation  -PC     Mode of Treatment physical therapy  -PC      Patient Effort good  -     Row Name 05/02/18 1448          General Information    Patient Profile Reviewed? yes  -PC     Onset of Illness/Injury or Date of Surgery 04/30/18  -PC     Referring Physician Maureen  -PC     Patient Observations alert;cooperative  -PC     Patient/Family Observations pt is in bed, no acute distress  -PC     Prior Level of Function independent:;all household mobility;community mobility  -     Existing Precautions/Restrictions fall  -PC     Row Name 05/02/18 1448          Relationship/Environment    Lives With spouse  -     Row Name 05/02/18 1448          Cognitive Assessment/Intervention- PT/OT    Orientation Status (Cognition) oriented to;person;place;situation  -PC     Follows Commands (Cognition) WNL  -     Safety Deficit (Cognitive) impulsivity;mild deficit;insight into deficits/self awareness  -     Row Name 05/02/18 1448          Bed Mobility Assessment/Treatment    Bed Mobility Assessment/Treatment supine-sit;sit-supine  -     Supine-Sit Dolores (Bed Mobility) independent  -     Sit-Supine Dolores (Bed Mobility) independent  -     Row Name 05/02/18 1448          Transfer Assessment/Treatment    Transfer Assessment/Treatment sit-stand transfer;stand-sit transfer  -     Sit-Stand Dolores (Transfers) supervision  -     Stand-Sit Dolores (Transfers) supervision  -     Row Name 05/02/18 1448          Gait/Stairs Assessment/Training    Dolores Level (Gait) contact guard  -     Distance in Feet (Gait) 150 ft  -PC     Pattern (Gait) step-through  -PC     Deviations/Abnormal Patterns (Gait) ataxic  -PC     Comment (Gait/Stairs) gait was very ataxic, needed assist for balance, dec balance remi on turns  -     Row Name 05/02/18 1448          General ROM    GENERAL ROM COMMENTS WFL  -PC     Row Name 05/02/18 1448          General Assessment (Manual Muscle Testing)    Comment, General Manual Muscle Testing (MMT) Assessment B UE 4+/5, BLE 4+/5  -PC      Row Name 05/02/18 1448          Pain Scale: Numbers Pre/Post-Treatment    Pain Scale: Numbers, Pretreatment 0/10 - no pain  -PC     Row Name 05/02/18 1448          Physical Therapy Clinical Impression    Date of Referral to PT 05/02/18  -PC     Patient/Family Goals Statement (PT Clinical Impression) return to PLOF  -PC     Criteria for Skilled Interventions Met (PT Clinical Impression) yes;treatment indicated  -PC     Impairments Found (describe specific impairments) gait, locomotion, and balance  -PC     Rehab Potential (PT Clinical Summary) good, to achieve stated therapy goals  -PC     Row Name 05/02/18 1448          Physical Therapy Goals    Gait Training Goal Selection (PT) gait training, PT goal 1  -PC     Row Name 05/02/18 1448          Gait Training Goal 1 (PT)    Activity/Assistive Device (Gait Training Goal 1, PT) gait (walking locomotion);improve balance and speed  -PC     [place] Level (Gait Training Goal 1, PT) supervision required  -PC     Distance (Gait Goal 1, PT) 300 ft  -PC     Time Frame (Gait Training Goal 1, PT) 1 week  -PC     Row Name 05/02/18 1448          Positioning and Restraints    Pre-Treatment Position in bed  -PC     Post Treatment Position bed  -PC     In Bed supine;call light within reach;encouraged to call for assist;exit alarm on  -PC       User Key  (r) = Recorded By, (t) = Taken By, (c) = Cosigned By    Initials Name Provider Type    PC Denise Espinal, PT Physical Therapist              PT Recommendation and Plan  Planned Therapy Interventions (PT Eval): gait training, balance training  Therapy Frequency (PT Clinical Impression): daily  Plan of Care Reviewed With: patient  Outcome Summary: pt presents with gait instability, very ataxic gait, he will benefit from PT to address for safe d/c home.          Outcome Measures     Row Name 05/02/18 1400             How much help from another person do you currently need...    Turning from your back to your side while in flat bed  without using bedrails? 4  -PC      Moving from lying on back to sitting on the side of a flat bed without bedrails? 4  -PC      Moving to and from a bed to a chair (including a wheelchair)? 3  -PC      Standing up from a chair using your arms (e.g., wheelchair, bedside chair)? 4  -PC      Climbing 3-5 steps with a railing? 3  -PC      To walk in hospital room? 3  -PC      AM-PAC 6 Clicks Score 21  -PC         Functional Assessment    Outcome Measure Options AM-PAC 6 Clicks Basic Mobility (PT)  -PC        User Key  (r) = Recorded By, (t) = Taken By, (c) = Cosigned By    Initials Name Provider Type    PC Denise Espinal PT Physical Therapist           Time Calculation:         PT Charges     Row Name 05/02/18 1458             Time Calculation    Start Time 1433  -PC      Stop Time 1446  -PC      Time Calculation (min) 13 min  -PC      PT Received On 05/02/18  -PC      PT - Next Appointment 05/03/18  -PC      PT Goal Re-Cert Due Date 05/10/18  -PC        User Key  (r) = Recorded By, (t) = Taken By, (c) = Cosigned By    Initials Name Provider Type    PC Denise Espinal PT Physical Therapist          Therapy Charges for Today     Code Description Service Date Service Provider Modifiers Qty    81103007138 HC PT EVAL LOW COMPLEXITY 2 5/2/2018 Denise Espinal, PT GP 1    53368247070 HC PT THER PROC EA 15 MIN 5/2/2018 Denise Espinal, PT GP 1          PT G-Codes  Outcome Measure Options: AM-PAC 6 Clicks Basic Mobility (PT)      Denise Espinal PT  5/2/2018

## 2018-05-02 NOTE — PROGRESS NOTES
"DOS: 2018  NAME: Carlito Mo   : 1955  PCP: García Rehman MD  Chief Complaint   Patient presents with   • Altered Mental Status     Stroke    Subjective: No events overnight. No complaints/issues reported.     Patient denies HA, double/blurred vision, dizziness, numbness or loss of sensation or any other new neurological complaints at this time.       Objective:  Vital signs: /68 (BP Location: Left arm, Patient Position: Lying)   Pulse 60   Temp 97.9 °F (36.6 °C) (Oral)   Resp 16   Ht 182.9 cm (72\")   Wt 80.4 kg (177 lb 4 oz)   SpO2 97%   BMI 24.04 kg/m²       HEENT: Normocephalic, atraumatic   COR: RRR  Resp: Even and unlabored  Extremities: Equal pulses, non distal embolization    Neurological:   MS: AO. Language normal. No neglect. Higher integrative function normal  CN: II-XII normal  Motor: 5/5, normal tone  Sensory: Intact  Coordination: Normal (finger to nose and heel to shin)     Laboratory results:  Lab Results   Component Value Date    GLUCOSE 107 (H) 2018    CALCIUM 8.7 2018     2018    K 3.9 2018    CO2 27.3 2018     2018    BUN 21 2018    CREATININE 1.42 (H) 2018    EGFRIFAFRI 74 2017    EGFRIFNONA 51 (L) 2018    BCR 14.8 2018    ANIONGAP 10.7 2018     Lab Results   Component Value Date    WBC 8.71 2018    HGB 10.8 (L) 2018    HCT 35.0 (L) 2018    MCV 81.6 2018     2018     Lab Results   Component Value Date    CHOL 275 (H) 2018    CHOL 257 (H) 2018    CHOL 331 (H) 2018     Lab Results   Component Value Date    HDL 50 2018    HDL 50 2018    HDL 47 2018     Lab Results   Component Value Date     (H) 2018     (H) 2018    LDL  2018      Comment:      Unable to calculate     (H) 2018     Lab Results   Component Value Date    HGBA1C 10.60 (H) 2018     Lab Results   Component Value " Date    CHOL 275 (H) 05/02/2018    CHLPL 159 12/18/2017    TRIG 81 05/02/2018    HDL 50 05/02/2018     (H) 05/02/2018     Lab Results   Component Value Date    TSH 5.490 (H) 12/18/2017     Lab Results   Component Value Date    QVMHIPBY81 388 12/02/2017 12/5/17 TTE   Interpretation Summary     · Left ventricular systolic function is mildly decreased. Calculated EF = 45%. Normal left ventricular cavity size and wall thickness noted.  · The following segments are akinetic: mid anteroseptal, mid inferoseptal, apical anterior, apical septal and apex.  · Left atrial cavity size is moderately dilated.  · Doppler interrogation shows mildly reduced flow within the left atrial appendage.  · Small patent foramen ovale present.  · The mitral valve is abnormal in structure. There is moderate bileaflet thickening present.  · Mild mitral valve regurgitation is present.          02/17/18 NEISHA  Interpretation Summary     · Calculated EF = 62.9%  · Left ventricular systolic function is normal.  · Left ventricular diastolic dysfunction (grade I) consistent with impaired relaxation.  · Mild mitral valve regurgitation is present  · Saline test results are negative.     02/21 ZIO PAtch results           Review and interpretation of imaging:  CT Head   IMPRESSION:  No acute process identified. See above. Further evaluation  could be performed with a MRI examination of brain as indicated    MRI Brain   IMPRESSION:  1. Subcentimeter acute bilateral cerebral hemisphere infarctions. There  are additional cerebral and cerebellar infarctions of various ages with  some appearing subacute and some chronic and there has been evolution of  the small infarctions that were demonstrated to be acute on previous  brain MRI 02/17/2018. Infarcts are in various vascular territories  supporting the presence of embolic phenomena.  2. Chronic small vessel ischemic white matter change similar to the  previous exam.    Impression: This is a 63 yo male  "who presented to [facility] with complaint of AMS. Patient with prior diagnosis of CVA in 12/17 likely cardioembolic and had NEISHA which demonstrated a small PFO. He was admitted again admitted in February 2018 w/ embolic CVA and multiple vascular distribution thought to be d/t noncompliance with modifiable risk factor management.   Imaging revealed cute bilateral cerebral hemisphere infarctions. We recommend aggressive risk factor control and compliance with recommendations. Patient reports that his reason for non-compliance is not d/t cost he says that he has an autistic son who consumes his time and requires him to take his \"train watching\" therefore he is unable to comply with recommendations. We recommend follow up this month in stroke clinic (from prev. Event). Patient reports that he has an appointment in early June. I stressed the importance of keeping the planned follow up. Patient has agreed to need to consult dietician/diabetic educator to assist with lifestyle changes. No further neurology workup indicated. We will sign off and see again upon request.      Plan:  · B-12 oral (as written) B-12 (388)  · Dietician consult   · Diabetic educator consult   · Aggressive risk factor management  · Control BP   · Diabetes   · Cholesterol   · Weight Management   · DAPT as written   · Atorvastatin 80mg as written (LDL >200)   · Follow up in stroke clinic per previous recommendation; if no appointment planned follow up this month      Ideal targets for stroke prevention would be :  Blood pressure < 130/80; B12 > 500 TSH in normal range and LDL < 70; HbA1c < 6.5 and smoking cessation if applicable.      Case reviewed with Dr. Reddy and he agrees w/ plan.     "

## 2018-05-02 NOTE — PLAN OF CARE
Problem: Confusion, Acute (Adult)  Goal: Identify Related Risk Factors and Signs and Symptoms  Outcome: Ongoing (interventions implemented as appropriate)    Goal: Cognitive/Functional Impairments Minimized  Outcome: Ongoing (interventions implemented as appropriate)    Goal: Safety  Outcome: Ongoing (interventions implemented as appropriate)      Problem: Patient Care Overview  Goal: Plan of Care Review  Outcome: Ongoing (interventions implemented as appropriate)   05/02/18 9321   Coping/Psychosocial   Plan of Care Reviewed With patient   Plan of Care Review   Progress improving   OTHER   Outcome Summary Pt slept through the night, lethargic when doing NIH test, no complaints of pain,vss, will continue to monitor.

## 2018-05-02 NOTE — THERAPY EVALUATION
Acute Care - Occupational Therapy Initial Evaluation   [facility]     Patient Name: Carlito Mo  : 1955  MRN: [id number]  Today's Date: 2018  Onset of Illness/Injury or Date of Surgery: 18     Referring Physician: Maureen    Admit Date: 2018       ICD-10-CM ICD-9-CM   1. Hypertensive encephalopathy syndrome I67.4 437.2   2. Hyperglycemia R73.9 790.29   3. History of medication noncompliance Z91.14 V15.81   4. Difficulty walking R26.2 719.7     Patient Active Problem List   Diagnosis   • Depression   • Hyperlipidemia   • Chronic ischemic heart disease   • Vitamin D deficiency   • Erectile dysfunction   • Chronic fatigue   • Diabetes mellitus   • Skin lesion of chest wall   • Slow transit constipation   • Atherosclerosis of coronary artery   • Benign prostatic hyperplasia   • Chronic kidney disease   • History of basal cell carcinoma   • Essential hypertension   • Acquired hypothyroidism   • Hypertensive urgency   • Cerebrovascular accident (CVA) due to embolism of precerebral artery   • Hypertensive encephalopathy syndrome     Past Medical History:   Diagnosis Date   • Acute sinusitis    • Anemia    • Arthritis    • Cancer     skin   • Chronic ischemic heart disease    • Depression    • Diabetes mellitus type 2, uncontrolled, without complications    • Heart attack    • Hyperlipidemia    • Hypertension    • Screening for prostate cancer    • Stroke    • TIA (transient ischemic attack)    • Type 2 diabetes mellitus    • Vitamin D deficiency      Past Surgical History:   Procedure Laterality Date   • APPENDECTOMY N/A 2016    Dr. Alexey Dodson   • CORONARY ARTERY BYPASS GRAFT  2001          OT ASSESSMENT FLOWSHEET (last 72 hours)      Occupational Therapy Evaluation     Row Name 18 1500                   OT Evaluation Time/Intention    Subjective Information complains of;fatigue  -        Document Type evaluation;therapy note (daily note)  -        Mode of Treatment  individual therapy;occupational therapy  -KP        Patient Effort good  -KP           General Information    Patient Profile Reviewed? yes  -KP        Patient Observations cooperative;agree to therapy;lethargic   very tired  -KP        Prior Level of Function independent:;ADL's;transfer  -        Equipment Currently Used at Home none  -KP        Benefits Reviewed patient:;improve function;increase independence;increase strength;increase balance  -           Cognitive Assessment/Intervention- PT/OT    Orientation Status (Cognition) oriented x 4  -KP        Follows Commands (Cognition) WFL  -KP        Safety Deficit (Cognitive) mild deficit  -           Bed Mobility Assessment/Treatment    Supine-Sit [place] (Bed Mobility) independent  -        Sit-Supine [place] (Bed Mobility) independent  -           Functional Mobility    Functional Mobility- Ind. Level supervision required;set up required  -        Functional Mobility- Comment walks in room, to BR, to bed  -           Transfer Assessment/Treatment    Transfer Assessment/Treatment sit-stand transfer;stand-sit transfer  -        Sit-Stand [place] (Transfers) set up;supervision  -        Stand-Sit [place] (Transfers) set up;supervision  -           ADL Assessment/Intervention    BADL Assessment/Intervention lower body dressing  -           Lower Body Dressing Assessment/Training    Lower Body Dressing [place] Level doff;don;socks;supervision  -        Lower Body Dressing Position edge of bed sitting  -           General ROM    GENERAL ROM COMMENTS B UE 8/8  -KP           General Assessment (Manual Muscle Testing)    Comment, General Manual Muscle Testing (MMT) Assessment B UE 4/5  -KP           Motor Assessment/Interventions    Additional Documentation Balance (Group);Balance Interventions (Group);Fine Motor Testing & Training (Group);Functional Endurance Training (Group);Therapeutic Exercise Interventions  (Group);Therapeutic Exercise (Group)  -           Balance    Balance static sitting balance;static standing balance  -           Static Sitting Balance    Level of [place] (Unsupported Sitting, Static Balance) supervision  -        Sitting Position (Unsupported Sitting, Static Balance) sitting on edge of bed  -        Time Able to Maintain Position (Unsupported Sitting, Static Balance) 3 to 4 minutes  -           Static Standing Balance    Level of [place] (Supported Standing, Static Balance) supervision  -        Time Able to Maintain Position (Supported Standing, Static Balance) 2 to 3 minutes  -           Fine Motor Testing & Training    Training Activity, Fine Motor Coordination other (see comments)   opposition in tact B hands  -KP           Functional Endurance Training    Comment, Functional Endurance fair +  -KP           Sensory Assessment/Intervention    Sensory General Assessment no sensation deficits identified  -KP           Positioning and Restraints    Pre-Treatment Position in bed  -KP        Post Treatment Position bed  -KP        In Bed supine;call light within reach;exit alarm on;encouraged to call for assist  -           Pain Scale: Numbers Pre/Post-Treatment    Pain Scale: Numbers, Pretreatment 0/10 - no pain  -           Plan of Care Review    Plan of Care Reviewed With patient  -KP           Clinical Impression (OT)    Criteria for Skilled Therapeutic Interventions Met (OT Eval) treatment indicated;yes  -KP        Rehab Potential (OT Eval) good, to achieve stated therapy goals  -KP        Therapy Frequency (OT Eval) 5 times/wk  -        Care Plan Review (OT) evaluation/treatment results reviewed  -        Anticipated Discharge Disposition (OT) home or self care   home w. family  -           OT Goals    Transfer Goal Selection (OT) transfer, OT goal 1  -        Bathing Goal Selection (OT) bathing, OT goal 1  -KP           Transfer Goal 1 (OT)     Activity/Assistive Device (Transfer Goal 1, OT) toilet;sit-to-stand/stand-to-sit  -KP        [place] Level/Cues Needed (Transfer Goal 1, OT) conditional independence  -KP        Time Frame (Transfer Goal 1, OT) long term goal (LTG);by discharge  -KP        Progress/Outcome (Transfer Goal 1, OT) goal ongoing  -KP           Bathing Goal 1 (OT)    Activity/Assistive Device (Bathing Goal 1, OT) bathing skills, all  -KP        [place] Level/Cues Needed (Bathing Goal 1, OT) conditional independence  -KP        Time Frame (Bathing Goal 1, OT) long term goal (LTG);by discharge  -KP        Progress/Outcomes (Bathing Goal 1, OT) goal ongoing  -KP           Patient Education Goal (OT)    Activity (Patient Education Goal, OT) pt ed on safety w. ADLs and tsf  -KP        [place]/Cues/Accuracy (Memory Goal 2, OT) independent  -KP        Time Frame (Patient Education Goal, OT) long term goal (LTG);by discharge  -KP        Progress/Outcome (Patient Education Goal, OT) goal ongoing  -KP          User Key  (r) = Recorded By, (t) = Taken By, (c) = Cosigned By    Initials Name Effective Dates     Beatriz Balderrama, OTR 04/13/15 -            Occupational Therapy Education     Title: PT OT SLP Therapies (Active)     Topic: Occupational Therapy (Done)     Point: ADL training (Done)     Description: Instruct learner(s) on proper safety adaptation and remediation techniques during self care or transfers.   Instruct in proper use of assistive devices.   Learning Progress Summary     Learner Status Readiness Method Response Comment Documented by    Patient Done Acceptance E,D NR,JEAN,EBONI ed pt on role of OT. ed on POC w. OT. ed on safety w. ADLs and tsf. pt a little unsteady but also very tired and was just woken up by therapist. pt aware of why he is here  05/02/18 6898          Point: Precautions (Done)     Description: Instruct learner(s) on prescribed precautions during self-care and functional transfers.   Learning  Progress Summary     Learner Status Readiness Method Response Comment Documented by    Patient Done Acceptance E,D NR,VU,DU ed pt on role of OT. ed on POC w. OT. ed on safety w. ADLs and tsf. pt a little unsteady but also very tired and was just woken up by therapist. pt aware of why he is here  05/02/18 1528          Point: Body mechanics (Done)     Description: Instruct learner(s) on proper positioning and spine alignment during self-care, functional mobility activities and/or exercises.   Learning Progress Summary     Learner Status Readiness Method Response Comment Documented by    Patient Done Acceptance E,D NR,VU,DU ed pt on role of OT. ed on POC w. OT. ed on safety w. ADLs and tsf. pt a little unsteady but also very tired and was just woken up by therapist. pt aware of why he is here  05/02/18 1528                      User Key     Initials Effective Dates Name Provider Type Discipline     04/13/15 -  Beatriz Balderrama, OTR Occupational Therapist OT                  OT Recommendation and Plan  Outcome Summary/Treatment Plan (OT)  Anticipated Discharge Disposition (OT): home or self care (home w. family)  Therapy Frequency (OT Eval): 5 times/wk  Plan of Care Review  Plan of Care Reviewed With: patient  Plan of Care Reviewed With: patient  Outcome Summary: pt evaluated for OT. pt was SBA w. tsf, SBA w. LBD socks. UE ROM is 8/8. FMC is good w. opposition. Pt states he showered this am but per nsg he did not, need to assess ADLs to determine independence level w. self-care and if he has further OT needs self-care wise.          Outcome Measures     Row Name 05/02/18 1530 05/02/18 1400          How much help from another person do you currently need...    Turning from your back to your side while in flat bed without using bedrails?  -- 4  -PC     Moving from lying on back to sitting on the side of a flat bed without bedrails?  -- 4  -PC     Moving to and from a bed to a chair (including a wheelchair)?   -- 3  -PC     Standing up from a chair using your arms (e.g., wheelchair, bedside chair)?  -- 4  -PC     Climbing 3-5 steps with a railing?  -- 3  -PC     To walk in hospital room?  -- 3  -PC     AM-PAC 6 Clicks Score  -- 21  -PC        How much help from another is currently needed...    Putting on and taking off regular lower body clothing? 3  -KP  --     Bathing (including washing, rinsing, and drying) 3  -KP  --     Toileting (which includes using toilet bed pan or urinal) 3  -KP  --     Putting on and taking off regular upper body clothing 4  -KP  --     Taking care of personal grooming (such as brushing teeth) 4  -KP  --     Eating meals 4  -KP  --     Score 21  -KP  --        Modified [place] Scale    Modified [place] Scale 2 - Slight disability.  Unable to carry out all previous activities but able to look after own affairs without assistance.  -KP  --        Functional Assessment    Outcome Measure Options AM-PAC 6 Clicks Daily Activity (OT);Modified [place]  -KP AM-PAC 6 Clicks Basic Mobility (PT)  -PC       User Key  (r) = Recorded By, (t) = Taken By, (c) = Cosigned By    Initials Name Provider Type     Beatriz Balderrama OTR Occupational Therapist    PC Denise Espinal, PT Physical Therapist          Time Calculation:   OT Start Time: 1406  OT Stop Time: 1416  OT Time Calculation (min): 10 min    Therapy Charges for Today     Code Description Service Date Service Provider Modifiers Qty    [id number]  OT EVAL LOW COMPLEXITY 2 5/2/2018 FADI Smart GO 1               Beatriz Balderrama OTR  5/2/2018

## 2018-05-02 NOTE — PLAN OF CARE
Problem: Patient Care Overview  Goal: Plan of Care Review   05/02/18 1113   Coping/Psychosocial   Plan of Care Reviewed With patient   Coping/Psychosocial   Patient Agreement with Plan of Care agrees   Plan of Care Review   Progress improving   OTHER   Outcome Summary Bedside swallow revealed functional swallow. Pt exhibiting mild dysarthia and c/o cognitive changes, unsure if acute. Pt did exhibit errors with sentence repetitions and 3 step commands. SLP suspects memory deficit. Will follow for cog eval.

## 2018-05-02 NOTE — THERAPY EVALUATION
Acute Care - Speech Language Pathology   Swallow Initial Evaluation  [facility]     Patient Name: Carlito Mo  : 1955  MRN: [id number]  Today's Date: 2018               Admit Date: 2018    Visit Dx:     ICD-10-CM ICD-9-CM   1. Hypertensive encephalopathy syndrome I67.4 437.2   2. Hyperglycemia R73.9 790.29   3. History of medication noncompliance Z91.14 V15.81     Patient Active Problem List   Diagnosis   • Depression   • Hyperlipidemia   • Chronic ischemic heart disease   • Vitamin D deficiency   • Erectile dysfunction   • Chronic fatigue   • Diabetes mellitus   • Skin lesion of chest wall   • Slow transit constipation   • Atherosclerosis of coronary artery   • Benign prostatic hyperplasia   • Chronic kidney disease   • History of basal cell carcinoma   • Essential hypertension   • Acquired hypothyroidism   • Hypertensive urgency   • Cerebrovascular accident (CVA) due to embolism of precerebral artery   • Hypertensive encephalopathy syndrome     Past Medical History:   Diagnosis Date   • Acute sinusitis    • Anemia    • Arthritis    • Cancer     skin   • Chronic ischemic heart disease    • Depression    • Diabetes mellitus type 2, uncontrolled, without complications    • Heart attack    • Hyperlipidemia    • Hypertension    • Screening for prostate cancer    • Stroke    • TIA (transient ischemic attack)    • Type 2 diabetes mellitus    • Vitamin D deficiency      Past Surgical History:   Procedure Laterality Date   • APPENDECTOMY N/A 2016    Dr. Alexey Dodson   • CORONARY ARTERY BYPASS GRAFT  2001          SWALLOW EVALUATION (last 72 hours)      SLP Adult Swallow Evaluation     Row Name 18 1100                   Rehab Evaluation    Document Type evaluation  -SH        Patient Observations alert;cooperative  -SH        Patient Effort excellent  -SH           General Information    Patient Profile Reviewed yes  -SH        Pertinent History Of Current Problem acute bilateral  infarcts, subacute and chronic infarcts as well  -        Current Method of Nutrition regular textures;thin liquids  -        Prior Level of Function-Swallowing no diet consistency restrictions  -        Plans/Goals Discussed with patient  -        Barriers to Rehab cognitive status  -        Patient's Goals for Discharge patient did not state  -           Pain Assessment    Additional Documentation Pain Scale: Numbers Pre/Post-Treatment (Group)  -           Pain Scale: Numbers Pre/Post-Treatment    Pain Scale: Numbers, Pretreatment 0/10 - no pain  -           Oral Motor and Function    Dentition Assessment natural, present and adequate  -        Secretion Management WNL/WFL  -        Volitional Swallow WFL  -        Volitional Cough WFL  -           Oral Musculature and Cranial Nerve Assessment    Oral Motor General Assessment WFL  -           Clinical Swallow Eval    Oral Prep Phase WFL  -        Oral Transit WFL  -        Oral Residue WFL  -        Pharyngeal Phase WFL  -        Clinical Swallow Evaluation Summary No overt s/s of aspiration across trials of thins, puree, mech soft, or regular. Voice clear post swallow. Laryngeal elevation adequate with timely initiation. No oral residue post swallow. Pt denies dysphagia. SLP recs regular and thins.    -           Clinical Impression    SLP Swallowing Diagnosis functional oral phase;functional pharyngeal phase  -        Functional Impact no impact on function  -        Criteria for Skilled Therapeutic Interventions Met demonstrates skilled criteria  -           Recommendations    Therapy Frequency (Swallow) PRN  -        Predicted Duration Therapy Intervention (Days) until discharge  -        SLP Diet Recommendation regular textures;thin liquids  -        Recommended Precautions and Strategies upright posture during/after eating  -        SLP Rec. for Method of Medication Administration meds whole;with thin liquids   -        Monitor for Signs of Aspiration yes;notify SLP if any concerns  -           Swallow Goals (SLP)    Oral Nutrition/Hydration Goal Selection (SLP) oral nutrition/hydration, SLP goal 1  -           Oral Nutrition/Hydration Goal 1 (SLP)    Oral Nutrition/Hydration Goal 1, SLP Pt will tolerate regular and thins without overt s/s of asp.   -          User Key  (r) = Recorded By, (t) = Taken By, (c) = Cosigned By    Initials Name Effective Dates     Amandajohnny Gan MS CCC-SLP 03/07/18 -         EDUCATION  The patient has been educated in the following areas:   Dysphagia (Swallowing Impairment).    SLP Recommendation and Plan  SLP Swallowing Diagnosis: functional oral phase, functional pharyngeal phase  SLP Diet Recommendation: regular textures, thin liquids  Recommended Precautions and Strategies: upright posture during/after eating     Monitor for Signs of Aspiration: yes, notify SLP if any concerns     Criteria for Skilled Therapeutic Interventions Met: demonstrates skilled criteria  Anticipated Dischage Disposition: unknown     Therapy Frequency (Swallow): PRN  Predicted Duration Therapy Intervention (Days): until discharge       Plan of Care Reviewed With: patient  Plan of Care Review  Plan of Care Reviewed With: patient  Progress: improving  Outcome Summary: Bedside swallow revealed functional swallow. Pt exhibiting mild dysarthia and c/o cognitive changes, unsure if acute. Pt did exhibit errors with sentence repetitions and 3 step commands. SLP suspects memory deficit. Will follow for cog eval.           [facility] GOALS     Row Name 05/02/18 1100 05/02/18 1000          Oral Nutrition/Hydration Goal 1 (SLP)    Oral Nutrition/Hydration Goal 1, SLP Pt will tolerate regular and thins without overt s/s of asp.   -  --        Articulation Goal 1 (SLP)    Improve Articulation Goal 1 (SLP)  -- by over-articulating at word level;by over-articulating at phrase level;by over-articulating in connected  speech;90%;independently (over 90% accuracy)  -       User Key  (r) = Recorded By, (t) = Taken By, (c) = Cosigned By    Initials Name Provider Type    OMID Amanda Gan MS CCC-SLP Speech and Language Pathologist             SLP Outcome Measures (last 72 hours)      SLP Outcome Measures     Row Name 05/02/18 1100             SLP Outcome Measures    Outcome Measure Used? Adult NOMS  -         FCM Scores    FCM Chosen Motor Speech  -      Motor Speech FCM Score 6  -        User Key  (r) = Recorded By, (t) = Taken By, (c) = Cosigned By    Initials Name Effective Dates    OMID SIMONS MS FAISAL Gan-SLP 03/07/18 -            Time Calculation:         Time Calculation- SLP     Row Name 05/02/18 1114             Time Calculation- SLP    SLP Start Time 1000  -      SLP Received On 05/02/18  -        User Key  (r) = Recorded By, (t) = Taken By, (c) = Cosigned By    Initials Name Provider Type    OMID Gan MS CCC-SLP Speech and Language Pathologist          Therapy Charges for Today     Code Description Service Date Service Provider Modifiers Qty    [id number]  ST EVAL ORAL PHARYNG SWALLOW 3 5/2/2018 Amanda Gan MS CCC-SLP GN 1    [id number] HC ST EVAL SPEECH AND PROD W LANG  3 5/2/2018 Amanda Gan MS CCC-SLP GN 1               Amanda Gan MS CCC-HA  5/2/2018

## 2018-05-02 NOTE — PROGRESS NOTES
Continued Stay Note   [facility]     Patient Name: Carlito Mo  MRN: [id number]  Today's Date: 5/2/2018    Admit Date: 4/30/2018          Discharge Plan     Row Name 05/02/18 9695       Plan    Plan Home; outpatient PT/OT ([facility]) vs. [facility]     Patient/Family in Agreement with Plan yes    Plan Comments CCP provided patient with [employer] Coupons for his prescriptions. Patient was open to using [facility] if he qualifies for home health; Patient is also open to continuing physical therapy at outpatient [facility]. CCP will f/u tomorrow with [facility]. Glenys DAVE     Row Name 05/02/18 6003       Plan    Plan Home with HH/outpatient physical therapy     Patient/Family in Agreement with Plan yes    Plan Comments CCP met with patient at bedside. CCP role explained and discharge planning discussed. Face sheet verified. Patient does not have a POA/living will. Patient's emergency contact is [facility] [phone]. Patient's PCP is Dr. Rehman. Patient has three steps to the entrance of the home. Patient does have grab bars present in his bathroom. Patient has not used home health or been to SNF. Patient uses [facility] on [address]; patient denies any trouble remembering to take his medications. Patient states his medications are expensive at times. CCP will follow for HH choice or outpatient PT choice. Glenys GUERRIER               Discharge Codes    No documentation.           FAREED Bullock

## 2018-05-02 NOTE — PROGRESS NOTES
"Patient Name: Carlito Mo  :1955  62 y.o.      Patient Care Team:  García Rehman MD as PCP - General (Family Medicine)  García Rehman MD as PCP - Family Medicine    Interval History:   Several troponins checked and all hovering in the same range    Subjective:  Following for chronically elevated troponin     Objective   Vital Signs  Temp:  [97.4 °F (36.3 °C)-98.5 °F (36.9 °C)] 98.3 °F (36.8 °C)  Heart Rate:  [56-81] 62  Resp:  [16] 16  BP: (107-144)/(59-93) 143/87    Intake/Output Summary (Last 24 hours) at 18 0907  Last data filed at 18 0900   Gross per 24 hour   Intake              780 ml   Output              650 ml   Net              130 ml     Flowsheet Rows    Flowsheet Row First Filed Value   Admission Height 182.9 cm (72\") Documented at 2018 1712   Admission Weight 86.2 kg (190 lb) Documented at 2018 1712          Physical Exam:   General Appearance:    Alert, cooperative, in no acute distress   Lungs:     Clear to auscultation.  Normal respiratory effort and rate.      Heart:    Regular rhythm and normal rate, normal S1 and S2, no murmurs, gallops or rubs.     Chest Wall:    No abnormalities observed   Abdomen:     Soft, nontender, positive bowel sounds.     Extremities:   no cyanosis, clubbing or edema.  No marked joint deformities.  Adequate musculoskeletal strength.       Results Review:      Results from last 7 days  Lab Units 18  0454   SODIUM mmol/L 141   POTASSIUM mmol/L 3.9   CHLORIDE mmol/L 103   CO2 mmol/L 27.3   BUN mg/dL 21   CREATININE mg/dL 1.42*   GLUCOSE mg/dL 107*   CALCIUM mg/dL 8.7       Results from last 7 days  Lab Units 18  0454 18  1431 18  0910   TROPONIN T ng/mL 0.036* 0.040* 0.032*       Results from last 7 days  Lab Units 18  0454   WBC 10*3/mm3 8.71   HEMOGLOBIN g/dL 10.8*   HEMATOCRIT % 35.0*   PLATELETS 10*3/mm3 240           Results from last 7 days  Lab Units 18  0454   CHOLESTEROL mg/dL 275*           Results " from last 7 days  Lab Units 05/02/18  0454   CHOLESTEROL mg/dL 275*   TRIGLYCERIDES mg/dL 81   HDL CHOL mg/dL 50   LDL CHOL mg/dL 209*         Medication Review:     amLODIPine 5 mg Oral Daily   aspirin 81 mg Oral Daily   atorvastatin 80 mg Oral Daily   clopidogrel 75 mg Oral Daily   docusate sodium 100 mg Oral Daily   enoxaparin 40 mg Subcutaneous Q24H   insulin aspart 0-9 Units Subcutaneous 4x Daily With Meals & Nightly   insulin detemir 10 Units Subcutaneous Nightly   levothyroxine 50 mcg Oral Q AM   metoprolol tartrate 100 mg Oral Q12H          niCARdipine 5-15 mg/hr Last Rate: Stopped (05/01/18 0042)       Assessment/Plan     1.  Chronically elevated troponin.  No acute changes on EKG.  2.  History of stroke.  Transesophageal echocardiogram did not show cardiac source of embolus.  Zio patch did not show atrial fibrillation.  3.  History of coronary artery disease with remote history of bypass surgery in 2001.  3.  Hypertension  4.  Hyperlipidemia  5.  Diabetes  6.  Chronic kidney disease  7.  Acute mental status change.  Neurology consulted.  8.  Right bundle branch block     - Troponin elevated but stable.  No sign of acute coronary syndrome.  - MRI shows acute and chronic strokes consistent with embolic event.  Previous stroke workup was negative.  I will defer to neurology but would consider adding Eliquis 5 mg twice a day for presumed atrial fibrillation.   - Keep follow-up appointment with me in July    Amber Murguia MD, [facility]  05/02/18  9:07 AM

## 2018-05-03 LAB
ANION GAP SERPL CALCULATED.3IONS-SCNC: 10.4 MMOL/L
BASOPHILS # BLD AUTO: 0 10*3/MM3 (ref 0–0.2)
BASOPHILS NFR BLD AUTO: 0 % (ref 0–1.5)
BUN BLD-MCNC: 21 MG/DL (ref 8–23)
BUN/CREAT SERPL: 17.5 (ref 7–25)
CALCIUM SPEC-SCNC: 8.5 MG/DL (ref 8.6–10.5)
CHLORIDE SERPL-SCNC: 105 MMOL/L (ref 98–107)
CO2 SERPL-SCNC: 26.6 MMOL/L (ref 22–29)
CREAT BLD-MCNC: 1.2 MG/DL (ref 0.76–1.27)
DEPRECATED RDW RBC AUTO: 42.3 FL (ref 37–54)
EOSINOPHIL # BLD AUTO: 0.31 10*3/MM3 (ref 0–0.7)
EOSINOPHIL NFR BLD AUTO: 5.2 % (ref 0.3–6.2)
ERYTHROCYTE [DISTWIDTH] IN BLOOD BY AUTOMATED COUNT: 14.2 % (ref 11.5–14.5)
GFR SERPL CREATININE-BSD FRML MDRD: 61 ML/MIN/1.73
GLUCOSE BLD-MCNC: 78 MG/DL (ref 65–99)
GLUCOSE BLDC GLUCOMTR-MCNC: 104 MG/DL (ref 70–130)
GLUCOSE BLDC GLUCOMTR-MCNC: 194 MG/DL (ref 70–130)
GLUCOSE BLDC GLUCOMTR-MCNC: 230 MG/DL (ref 70–130)
HCT VFR BLD AUTO: 32.7 % (ref 40.4–52.2)
HGB BLD-MCNC: 10 G/DL (ref 13.7–17.6)
IMM GRANULOCYTES # BLD: 0.02 10*3/MM3 (ref 0–0.03)
IMM GRANULOCYTES NFR BLD: 0.3 % (ref 0–0.5)
LYMPHOCYTES # BLD AUTO: 2.06 10*3/MM3 (ref 0.9–4.8)
LYMPHOCYTES NFR BLD AUTO: 34.3 % (ref 19.6–45.3)
MCH RBC QN AUTO: 24.8 PG (ref 27–32.7)
MCHC RBC AUTO-ENTMCNC: 30.6 G/DL (ref 32.6–36.4)
MCV RBC AUTO: 80.9 FL (ref 79.8–96.2)
MONOCYTES # BLD AUTO: 0.47 10*3/MM3 (ref 0.2–1.2)
MONOCYTES NFR BLD AUTO: 7.8 % (ref 5–12)
NEUTROPHILS # BLD AUTO: 3.14 10*3/MM3 (ref 1.9–8.1)
NEUTROPHILS NFR BLD AUTO: 52.4 % (ref 42.7–76)
PLATELET # BLD AUTO: 216 10*3/MM3 (ref 140–500)
PMV BLD AUTO: 10.3 FL (ref 6–12)
POTASSIUM BLD-SCNC: 4.2 MMOL/L (ref 3.5–5.2)
RBC # BLD AUTO: 4.04 10*6/MM3 (ref 4.6–6)
SODIUM BLD-SCNC: 142 MMOL/L (ref 136–145)
WBC NRBC COR # BLD: 6 10*3/MM3 (ref 4.5–10.7)

## 2018-05-03 PROCEDURE — 80048 BASIC METABOLIC PNL TOTAL CA: CPT | Performed by: INTERNAL MEDICINE

## 2018-05-03 PROCEDURE — 97535 SELF CARE MNGMENT TRAINING: CPT

## 2018-05-03 PROCEDURE — 97110 THERAPEUTIC EXERCISES: CPT

## 2018-05-03 PROCEDURE — 92507 TX SP LANG VOICE COMM INDIV: CPT

## 2018-05-03 PROCEDURE — 85025 COMPLETE CBC W/AUTO DIFF WBC: CPT | Performed by: INTERNAL MEDICINE

## 2018-05-03 PROCEDURE — 82962 GLUCOSE BLOOD TEST: CPT

## 2018-05-03 PROCEDURE — 63710000001 INSULIN ASPART PER 5 UNITS: Performed by: INTERNAL MEDICINE

## 2018-05-03 PROCEDURE — 25010000002 ENOXAPARIN PER 10 MG: Performed by: INTERNAL MEDICINE

## 2018-05-03 RX ADMIN — ATORVASTATIN CALCIUM 80 MG: 80 TABLET, FILM COATED ORAL at 09:14

## 2018-05-03 RX ADMIN — ASPIRIN 81 MG: 81 TABLET ORAL at 09:14

## 2018-05-03 RX ADMIN — LEVOTHYROXINE SODIUM 50 MCG: 50 TABLET ORAL at 06:35

## 2018-05-03 RX ADMIN — METOPROLOL TARTRATE 100 MG: 100 TABLET ORAL at 09:14

## 2018-05-03 RX ADMIN — DOCUSATE SODIUM 100 MG: 100 CAPSULE, LIQUID FILLED ORAL at 09:13

## 2018-05-03 RX ADMIN — METOPROLOL TARTRATE 100 MG: 100 TABLET ORAL at 20:20

## 2018-05-03 RX ADMIN — INSULIN ASPART 2 UNITS: 100 INJECTION, SOLUTION INTRAVENOUS; SUBCUTANEOUS at 11:45

## 2018-05-03 RX ADMIN — CLOPIDOGREL 75 MG: 75 TABLET, FILM COATED ORAL at 09:14

## 2018-05-03 RX ADMIN — INSULIN ASPART 4 UNITS: 100 INJECTION, SOLUTION INTRAVENOUS; SUBCUTANEOUS at 22:17

## 2018-05-03 RX ADMIN — AMLODIPINE BESYLATE 5 MG: 5 TABLET ORAL at 09:14

## 2018-05-03 RX ADMIN — ENOXAPARIN SODIUM 40 MG: 40 INJECTION SUBCUTANEOUS at 11:44

## 2018-05-03 RX ADMIN — Medication 1000 MCG: at 09:14

## 2018-05-03 NOTE — PLAN OF CARE
Problem: Patient Care Overview  Goal: Plan of Care Review  Outcome: Ongoing (interventions implemented as appropriate)   05/03/18 1000   Coping/Psychosocial   Plan of Care Reviewed With patient   OTHER   Outcome Summary Attempt for DM ed w/pt this morning at bedside. This pt known to this RN from previous hospital admit. pt again describes depression as a major barrier to better DM care as an outpt. pt has not followed up on counseling- as we discussed at the past admission.

## 2018-05-03 NOTE — PLAN OF CARE
Problem: Fall Risk (Adult)  Goal: Absence of Fall  Outcome: Ongoing (interventions implemented as appropriate)      Problem: Patient Care Overview  Goal: Plan of Care Review  Outcome: Ongoing (interventions implemented as appropriate)   05/03/18 0450   Coping/Psychosocial   Plan of Care Reviewed With patient   Plan of Care Review   Progress improving   OTHER   Outcome Summary Patient with steady gait, ambulating in room, no complaints, possible dc today, will continue to monitor.

## 2018-05-03 NOTE — THERAPY EVALUATION
Acute Care - Speech Language Pathology Initial Evaluation   [facility]     Patient Name: Carlito Mo  : 1955  MRN: [id number]  Today's Date: 5/3/2018  Onset of Illness/Injury or Date of Surgery: 18     Referring Physician: Maureen      Admit Date: 2018     Visit Dx:    ICD-10-CM ICD-9-CM   1. Hypertensive encephalopathy syndrome I67.4 437.2   2. Hyperglycemia R73.9 790.29   3. History of medication noncompliance Z91.14 V15.81   4. Difficulty walking R26.2 719.7     Patient Active Problem List   Diagnosis   • Depression   • Hyperlipidemia   • Chronic ischemic heart disease   • Vitamin D deficiency   • Erectile dysfunction   • Chronic fatigue   • Diabetes mellitus   • Skin lesion of chest wall   • Slow transit constipation   • Atherosclerosis of coronary artery   • Benign prostatic hyperplasia   • Chronic kidney disease   • History of basal cell carcinoma   • Essential hypertension   • Acquired hypothyroidism   • Hypertensive urgency   • Cerebrovascular accident (CVA) due to embolism of precerebral artery   • Hypertensive encephalopathy syndrome     Past Medical History:   Diagnosis Date   • Acute sinusitis    • Anemia    • Arthritis    • Cancer     skin   • Chronic ischemic heart disease    • Depression    • Diabetes mellitus type 2, uncontrolled, without complications    • Heart attack    • Hyperlipidemia    • Hypertension    • Screening for prostate cancer    • Stroke    • TIA (transient ischemic attack)    • Type 2 diabetes mellitus    • Vitamin D deficiency      Past Surgical History:   Procedure Laterality Date   • APPENDECTOMY N/A 2016    Dr. Alexey Dodson   • CORONARY ARTERY BYPASS GRAFT  2001          SLP EVALUATION (last 72 hours)      SLP SLC Evaluation     Row Name 18 1600 18 1000                Communication Assessment/Intervention    Document Type evaluation  -SL evaluation  -SH       Subjective Information no complaints  -SL  --       Patient Observations  alert;cooperative  - alert;cooperative  -       Patient Effort excellent  - excellent  -       Symptoms Noted During/After Treatment none  -  --          General Information    Patient Profile Reviewed yes  - yes  -       Pertinent History Of Current Problem hypertensive encephalopathy  - acute bilateral strokes as well as chronic and subacute  -       Precautions/Limitations, Vision [facility]  --       Precautions/Limitations, Hearing [facility]  --       Prior Level of Function-Communication [facility]  --       Plans/Goals Discussed with patient  - patient  -       Barriers to Rehab none identified  - cognitive status  -       Patient's Goals for Discharge return to home  - patient did not state  -       Family Goals for Discharge family did not state  -  --       Standardized Assessment Used --   informal assessment  -  --          Pain Assessment    Additional Documentation  -- Pain Scale: FACES Pre/Post-Treatment (Group)  -          Pain Scale: Numbers Pre/Post-Treatment    Pain Scale: Numbers, Pretreatment  -- 0/10 - no pain  -          Comprehension Assessment/Intervention    Comprehension Assessment/Intervention  -- Auditory Comprehension  -          Auditory Comprehension Assessment/Intervention    Auditory Comprehension (Communication)  -- mild impairment;other (see comments)   suspect deficits related to short term memory deficits  -       Able to Identify Objects/Pictures (Communication)  -- body part;familiar objects;pictures of common objects;[facility]  -       Answers Questions (Communication)  -- yes/no;wh questions;personal;simple;[facility]  -       Able to Follow Commands (Communication)  -- 1-step;2-step;WFL;3-step;mild impairment  -       Successful Auditory Strategies (Communication)  -- repetition  -       Auditory Comprehension Communication, Comment  -- SLP questions short term memory. Will follow for cognitive assessment.  -          Expression  Assessment/Intervention    Expression Assessment/Intervention  -- verbal expression  -SH          Verbal Expression Assessment/Intervention    Verbal Expression  -- mild impairment;other (see comments)   suspect 2/2 short term memory deficits   -       Automatic Speech (Communication)  -- response to greeting;alphabet;counting 1-20;days of week;WFL  -SH       Repetition  -- sounds;words;phrases;WFL;sentences;mild impairment  -SH       Phrase Completion  -- automatic/predictable;unpredictable;WFL  -SH       Responsive Naming  -- simple;complex;WFL  -SH       Confrontational Naming  -- high frequency;low frequency;L  -SH       Spontaneous/Functional Words  -- simple;complex;WFL  -SH       Sentence Formulation  -- simple;WFL  -SH       Conversational Discourse/Fluency  -- WFL  -       Verbal Expression, Comment  -- Pt presents with errors with repetition with sentences. SLP suspects 2/2 to cognitive deficits. Will follow for cognitive evaluation.   -          Oral Motor Structure and Function    Dentition Assessment  -- natural, present and adequate  -          Motor Speech Assessment/Intervention    Motor Speech Function  -- mild impairment  -       Characteristics Consistent with Dysarthria  -- decreased articulation;slurred speech  -       Initiation of Phonation (Communication)  -- voluntary;involuntary - (e.g. sneezing, laughing, yawning);WFL  -SH       Automatic Speech (Communication)  -- response to greeting;alphabet;counting 1-20;days of week;mild impairment;other (see comments)   mild imprecisions  -       Verbal Repetition (Communication)  -- monosyllabic words;polysyllabic words;words of increasing length;phrases;WFL;sentences;mild impairment  -       Phase Completion  -- automatic/predictable;unpredictable;WFL  -SH       Conversational Speech (Communication)  -- simple;mild impairment;other (see comments)   mild consonant imprecisions  -SH       Motor Speech, Comment  -- Pt presents with  mild dysarthria in conversation and with diadochokinetic speech tasks  -          Cognitive Assessment Intervention- SLP    Cognitive Function (Cognition) WFL  -SL  --       Orientation Status (Cognition) WNL  -SL  --       Memory (Cognitive) simple;functional;immediate;short-term;auditory  -SL  --       Attention (Cognitive) quiet environment;WFL  -SL  --       Thought Organization (Cognitive) concrete divergent;abstract convergent;verbal sequencing;WFL  -SL  --       Reasoning (Cognitive) simple;mod-complex;deductive;analogies;WFL  -SL  --       Problem Solving (Cognitive) simple;multifactorial;WFL  -SL  --       Functional Math (Cognitive) simple;word problems;L  -SL  --       Cognition, Comment Overall, informal assessemnt revealed cognition to be fxnl- 100% for situational problem solving, word deductions, homographs, similarities/differences, complex questions, math word probs, analogies, divergent thinking for thought organziation 9 15 items in 1 min), abstract convergent thinking;   90% for recall of short stories, and for recall of digit seq and sentences; mild tangential thought, but pt easily redirected  -SL  --          SLP Clinical Impressions    SLP Diagnosis Fxnl cognitive linguistic skills  -SL Mild dysarthria.   -       Rehab Potential/Prognosis good  -SL excellent  -       Criteria for Skilled Therapy Interventions Met no problems identified which require skilled intervention;baseline status   pt denies any cog changes/deficits  -SL yes  -SH       Functional Impact no impact on function  -SL no impact on function  -          Recommendations    Therapy Frequency ([facility]) evaluation only  - PRN  -       Predicted Duration Therapy Intervention (Days)  -- until discharge  -       Anticipated Dischage Disposition home  -SL unknown  -          Communication Treatment Objective and Progress Goals (SLP)    Motor Speech/Dysarthria Treatment Objectives  -- Motor Speech/Dysarthria Treatment  Objectives (Group)  -          Motor Speech/Dysarthria Treatment Objectives    Articulation Selection  -- articulation, SLP goal 1  -          Articulation Goal 1 (SLP)    Improve Articulation Goal 1 (SLP)  -- by over-articulating at word level;by over-articulating at phrase level;by over-articulating in connected speech;90%;independently (over 90% accuracy)  -          SLP Discharge Summary    Discharge Destination  -- unknown  -         User Key  (r) = Recorded By, (t) = Taken By, (c) = Cosigned By    Initials Name Effective Dates     Heike George MS CCC-SLP 04/13/15 -      Amanda Gan MS CCC-SLP 03/07/18 -                EDUCATION  The patient has been educated in the following areas:     Cognitive Impairment.    SLP Recommendation and Plan    SLP Diagnosis: Fxnl cognitive linguistic skills    Rehab Potential/Prognosis: good    Criteria for Skilled Therapy Interventions Met: no problems identified which require skilled intervention, baseline status (pt denies any cog changes/deficits)    Anticipated Dischage Disposition: home                         Plan of Care Review  Plan of Care Reviewed With: patient  Plan of Care Reviewed With: patient  Outcome Summary: Informal cognitive eval completed- pt displayed fxnl cognitive  linguistic skills              SLP GOALS     Row Name 05/02/18 1100 05/02/18 1000          Oral Nutrition/Hydration Goal 1 (SLP)    Oral Nutrition/Hydration Goal 1, SLP Pt will tolerate regular and thins without overt s/s of asp.   -  --        Articulation Goal 1 (SLP)    Improve Articulation Goal 1 (SLP)  -- by over-articulating at word level;by over-articulating at phrase level;by over-articulating in connected speech;90%;independently (over 90% accuracy)  -       User Key  (r) = Recorded By, (t) = Taken By, (c) = Cosigned By    Initials Name Provider Type     Amanda Gan, MS CCC-SLP Speech and Language Pathologist                 SLP Outcome Measures (last 72 hours)       SLP Outcome Measures     Row Name 05/02/18 1100             SLP Outcome Measures    Outcome Measure Used? Adult NOMS  -SH         FCM Scores    FCM Chosen Motor Speech  -SH      Motor Speech FCM Score 6  -SH        User Key  (r) = Recorded By, (t) = Taken By, (c) = Cosigned By    Initials Name Effective Dates    OMID Gan MS CCC-SLP 03/07/18 -               Time Calculation:           Time Calculation- SLP     Row Name 05/03/18 1617             Time Calculation- SLP    SLP Received On 05/03/18  -        User Key  (r) = Recorded By, (t) = Taken By, (c) = Cosigned By    Initials Name Provider Type    LAKEISHA George MS CCC-SLP Speech and Language Pathologist          Therapy Charges for Today     Code Description Service Date Service Provider Modifiers Qty    [id number]  ST TREATMENT SPEECH 4 5/3/2018 Heike George MS CCC-SLP GN 1             ADULT NOMS (last 72 hours)      Adult NOMS     Row Name 05/02/18 1100                   FCM Scores    FCM Chosen Motor Speech  -SH        Motor Speech FCM Score 6  -SH          User Key  (r) = Recorded By, (t) = Taken By, (c) = Cosigned By    Initials Name Effective Dates    OMID SIMONS Malik MS CCC-SLP 03/07/18 -                Heike George MS CCC-SLP  5/3/2018

## 2018-05-03 NOTE — CONSULTS
"Adult Nutrition  Assessment/PES    Patient Name:  Carlito Mo  YOB: 1955  MRN: [id number]  Admit Date:  4/30/2018    Assessment Date:  5/3/2018    Comments:  Reviewed diabetic diet with pt and encouraged compliance.          Adult Nutrition Assessment     Row Name 05/03/18 0810       Nutrition Prescription PO    Current PO Diet Regular    Common Modifiers Consistent Carbohydrate       PO Evaluation    % PO Intake 100%    Row Name 05/03/18 0809       Physical Findings    Skin --   intact    Row Name 05/03/18 0808       Nutrition/Diet History    Typical Food/Fluid Intake appetite good       Anthropometrics    Height 182.9 cm (72.01\")    Weight 82.6 kg (182 lb 1.6 oz)       Ideal Body Weight (IBW)    Ideal Body Weight (IBW) (kg) 82.09    % Ideal Body Weight 100.62       Body Mass Index (BMI)    BMI (kg/m2) 24.74    BMI Assessment BMI 25-29.9: overweight       IBW Adjustment, Para/Tetraplegia    5% Adjustment, Para (IBW) 77.99    10% Adjustment, Para (IBW) 73.88    10% Adjustment, Tetra (IBW) 73.88    15% Adjustment, Tetra (IBW) 69.78       Labs/Procedures/Meds    Lab Results Reviewed reviewed    Lab Results Comments glu, A1c, cr, hgb/hct    Row Name 05/03/18 0807       Reason for Assessment    Reason For Assessment physician consult    Diagnosis --   hypertensive enephalopathy, hx DM, depression, CKD, HTN, brain injury    Row Name 05/03/18 0500       Anthropometrics    Weight 82.6 kg (182 lb 1.6 oz)          Problem/Interventions:        Problem 1     Row Name 05/03/18 0810       Nutrition Diagnoses Problem 1    Problem 1 Knowledge Deficit    Etiology (related to) Medical Diagnosis    Endocrine DM                    Intervention Goal     Row Name 05/03/18 0811       Intervention Goal    General Maintain nutrition    PO Maintain intake    Weight Maintain weight            Nutrition Intervention     Row Name 05/03/18 0811       Nutrition Intervention    RD/Tech Action Care plan reviewd;Follow Tx progress "              Education/Evaluation     Row Name 05/03/18 0811       Education    Education Provided education regarding    Provided education regarding Healthy eating for diabetes;Key food habit change       Monitor/Evaluation    Monitor Per protocol        Electronically signed by:  Tracy Walls RD  05/03/18 8:11 AM

## 2018-05-03 NOTE — PLAN OF CARE
Problem: Patient Care Overview  Goal: Plan of Care Review  Outcome: Ongoing (interventions implemented as appropriate)   05/03/18 1206   Coping/Psychosocial   Plan of Care Reviewed With patient   Plan of Care Review   Progress improving   OTHER   Outcome Summary Pt making progress for safety and independence for adls

## 2018-05-03 NOTE — PLAN OF CARE
Problem: Patient Care Overview  Goal: Plan of Care Review   05/03/18 1618   Coping/Psychosocial   Plan of Care Reviewed With patient   OTHER   Outcome Summary Informal cognitive eval completed- pt displayed fxnl cognitive linguistic skills

## 2018-05-03 NOTE — PROGRESS NOTES
Jorge Shukla MD                          [phone]      Patient ID:    Name:  Carlito Mo    MRN:  [id number]    1955   62 y.o.  male            Patient Care Team:  García Rehman MD as PCP - General (Family Medicine)  García Rehman MD as PCP - Family Medicine      CC/Reason for visit:     Subjective: Pt seen and examined this AM. No acute overnight events noted. Doing better.     ROS:   Denies any fevers/ chills/ CP/ palpitations/ Nausea/ vomiting/ Diarrhoea  No Worsening cough or SOA.     Objective     Vital Signs past 24hrs    BP range: BP: (129-148)/(79-86) 143/86  Pulse range: Heart Rate:  [57-69] 57  Resp rate range: Resp:  [16-18] 18  Temp range: Temp (24hrs), Av.9 °F (36.6 °C), Min:97.8 °F (36.6 °C), Max:98.1 °F (36.7 °C)      Ventilator/Non-Invasive Ventilation Settings     None          Device (Oxygen Therapy): room air       82.6 kg (182 lb 1.6 oz); Body mass index is 24.69 kg/m².      Intake/Output Summary (Last 24 hours) at 18 1624  Last data filed at 18 1700   Gross per 24 hour   Intake              240 ml   Output                0 ml   Net              240 ml       Exam:    GEN:  No respiratory distress, appears stated age  EYES:   EOM-i, anicteric sclera bilat  ENT:    External ears/nose normal, OP clear  NECK:  Supple, midline trachea, No JVD or cervical LApathy  LUNGS: Bilateral breath sounds heard, Dec BS @ bases  CV:  Regular rate and rhythm, No murmur  ABD:  Non tender, no distended, no palpable liver or masses  EXT:  No cyanosis or clubbing, No peripheral/sacral edema    Scheduled meds:      amLODIPine 5 mg Oral Daily   aspirin 81 mg Oral Daily   atorvastatin 80 mg Oral Daily   clopidogrel 75 mg Oral Daily   docusate sodium 100 mg Oral Daily   enoxaparin 40 mg Subcutaneous Q24H   insulin aspart 0-9 Units Subcutaneous 4x Daily With Meals & Nightly   insulin detemir 10 Units Subcutaneous Nightly   levothyroxine 50 mcg Oral Q AM    metoprolol tartrate 100 mg Oral Q12H   vitamin B-12 1,000 mcg Oral Daily       IV meds:                           Data Review:        Results from last 7 days  Lab Units 05/03/18  0334 05/02/18  0454 05/01/18  0910 05/01/18  0041 04/30/18  1744   SODIUM mmol/L 142 141 139  --  136   POTASSIUM mmol/L 4.2 3.9 4.0  --  4.4   CHLORIDE mmol/L 105 103 101  --  95*   CO2 mmol/L 26.6 27.3 25.1  --  28.9   BUN mg/dL 21 21 16  --  16   CREATININE mg/dL 1.20 1.42* 1.09  --  1.54*   CALCIUM mg/dL 8.5* 8.7 8.5*  --  10.2   BILIRUBIN mg/dL  --   --   --   --  0.3   ALK PHOS U/L  --   --   --   --  82   ALT (SGPT) U/L  --   --   --   --  12   AST (SGOT) U/L  --   --   --   --  12   GLUCOSE mg/dL 78 107* 179*  --  368*   WBC 10*3/mm3 6.00 8.71 8.02  --  6.02   HEMOGLOBIN g/dL 10.0* 10.8* 10.7*  --  13.1*   PLATELETS 10*3/mm3 216 240 233  --  285   PROCALCITONIN ng/mL  --   --   --  0.05*  --        Lab Results   Component Value Date    CALCIUM 8.5 (L) 05/03/2018    PHOS 4.9 (H) 02/18/2018               Results from last 7 days  Lab Units 05/02/18  0454 05/01/18  1431 05/01/18  0910 05/01/18  0041   TROPONIN T ng/mL 0.036* 0.040* 0.032* 0.017       Results Review:    I have reviewed the available laboratory results and reviewed the chest imaging personally    Imaging Results (all)     Procedure Component Value Units Date/Time    MRI Brain Without Contrast [[id number]] Collected:  05/01/18 1914     Updated:  05/01/18 1930    Narrative:       MRI BRAIN WITHOUT CONTRAST     HISTORY: 62-year-old male with agitation. Patient is combative and  confused.     TECHNIQUE: Brain MRI without contrast includes sagittal T1 as well as  axial diffusion, FLAIR, T1, T2, gradient echo sequences.     COMPARISON: Head CT without contrast 04/30/2018, MRI brain without  contrast 02/17/2018, 12/02/2017.     FINDINGS: Within the central superior left frontal lobe white matter  there is an 8 mm focus of restricted diffusion that is new when compared  to the  previous MRI of 02/17/2018. Within the left superior, medial  occipital lobe there is a 5 mm focus restricted diffusion consistent  with an acute infarction. Within the right inferolateral parietal lobe  there is a 5 mm focus restricted diffusion consistent with an acute  infarction. There has been continued evolution of the infarction  demonstrated to be acute on previous brain MRI 02/17/2018. Within the  medial right cerebellar hemisphere there is a very faint focus  restricted diffusion measuring 6 mm consistent with a subacute  infarction. This is anterior to a chronic area of encephalomalacia that  measures 10 mm consistent with a chronic infarction. Additionally,  within the left parietal lobe there is a small cortical area of faint  restricted diffusion consistent with subacute infarction. There is  extensive confluent periventricular and patchy deep cerebral white  matter FLAIR hyperintense signal that is consistent with chronic small  vessel ischemic white matter change. There is no evidence for mass  effect or shift of midline structures. Ventricles are not changed in  size or configuration. No extra-axial fluid collection is evident.     Two small left parietal and single right frontal scalp and right  posterior suboccipital nodules are likely sebaceous cyst and are without  change. Mastoid air cells appear clear. A right maxillary sinus mucus  retention cyst measures 17 x 13 mm.       Impression:       1. Subcentimeter acute bilateral cerebral hemisphere infarctions. There  are additional cerebral and cerebellar infarctions of various ages with  some appearing subacute and some chronic and there has been evolution of  the small infarctions that were demonstrated to be acute on previous  brain MRI 02/17/2018. Infarcts are in various vascular territories  supporting the presence of embolic phenomena.  2. Chronic small vessel ischemic white matter change similar to the  previous exam.     Discussed with  Ellie, nurse caring for the patient on 05/01/2018 at 7  PM.     This report was finalized on 5/1/2018 7:27 PM by Dr. Keon Santoyo M.D..       CT Head Without Contrast [[id number]] Collected:  04/30/18 2141     Updated:  05/01/18 1244    Narrative:       CT HEAD WITHOUT CONTRAST     HISTORY: Encephalopathy.     TECHNIQUE: A noncontrasted CT examination of the brain was performed.     FINDINGS: The brain and ventricles are symmetrical. There is no evidence  of intracranial hemorrhage, hydrocephalus or of abnormal extra-axial  fluid. No focal area of decreased attenuation to suggest acute  infarction is identified. Areas of decreased attenuation are appreciated  involving the white matter of the cerebral bilaterally, nonspecific, but  suggesting small vessels changes. Partially calcified nodules are  identified involving the soft tissues of the scalp and the left parietal  region posterolaterally.       Impression:       No acute process identified. See above. Further evaluation  could be performed with a MRI examination of brain as indicated.                 Radiation dose reduction techniques were utilized, including automated  exposure control and exposure modulation based on body size.     This report was finalized on 5/1/2018 12:41 PM by Dr. Delmar Yuen MD.               ASSESSMENT:  Acute on chronic b/l hemispheric CVA   Hypertensive encephalopathy  Hyperglycemia  Medical noncompliance  SYLVAIN on CKD  H/o cardioembolic CVA(12/17)  CAD status post CABG in 2001    PLAN:  Noted plans from cardiology and neurology  Cardiology recommends full anticoagulation but defers to neurology who have recommended only dual antiplatelet therapy. No definitive cardioembolic source. A. fibrillation ruled out with [facility]  Non-compliance due to him taking care of son with asperger's. Promises he will do better now.   PT/ST/OT input noted.    Statin ordered.   Full code   D/c in AM. Home with HH.      I have discussed my  findings and recommendations with patient and nursing staff.     Jorge Shukla MD  5/3/2018

## 2018-05-03 NOTE — PLAN OF CARE
Problem: Patient Care Overview  Goal: Plan of Care Review  Outcome: Ongoing (interventions implemented as appropriate)   05/03/18 0859   Coping/Psychosocial   Plan of Care Reviewed With patient   OTHER   Outcome Summary Pt able to increase ambulation distance, but still unsteady w/ ambulation. Occassionally reaching for counters. 3 LOB occurred during turns while ambulating.

## 2018-05-03 NOTE — THERAPY TREATMENT NOTE
Acute Care - Physical Therapy Treatment Note   [facility]     Patient Name: Carlito Mo  : 1955  MRN: [id number]  Today's Date: 5/3/2018  Onset of Illness/Injury or Date of Surgery: 18  Date of Referral to PT: 18  Referring Physician: Maureen    Admit Date: 2018    Visit Dx:    ICD-10-CM ICD-9-CM   1. Hypertensive encephalopathy syndrome I67.4 437.2   2. Hyperglycemia R73.9 790.29   3. History of medication noncompliance Z91.14 V15.81   4. Difficulty walking R26.2 719.7     Patient Active Problem List   Diagnosis   • Depression   • Hyperlipidemia   • Chronic ischemic heart disease   • Vitamin D deficiency   • Erectile dysfunction   • Chronic fatigue   • Diabetes mellitus   • Skin lesion of chest wall   • Slow transit constipation   • Atherosclerosis of coronary artery   • Benign prostatic hyperplasia   • Chronic kidney disease   • History of basal cell carcinoma   • Essential hypertension   • Acquired hypothyroidism   • Hypertensive urgency   • Cerebrovascular accident (CVA) due to embolism of precerebral artery   • Hypertensive encephalopathy syndrome       Therapy Treatment          Rehabilitation Treatment Summary     Row Name 18 0830             Treatment Time/Intention    Discipline physical therapy assistant  -RW      Document Type therapy note (daily note)  -RW      Subjective Information no complaints  -RW      Patient Effort good  -RW      Existing Precautions/Restrictions fall  -RW      Recorded by [RW] Judi Alejandre PTA 18 0842 1842      Row Name 18 0830             Cognitive Assessment/Intervention- PT/OT    Orientation Status (Cognition) oriented x 4  -RW      Follows Commands (Cognition) WFL  -RW      Safety Deficit (Cognitive) mild deficit;insight into deficits/self awareness;awareness of need for assistance  -RW      Personal Safety Interventions fall prevention program maintained;gait belt;nonskid shoes/slippers when out of bed  -RW       Recorded by [RW] Judi Alejandre, PTA 05/03/18 0842 05/03/18 0842      Row Name 05/03/18 0830             Safety Issues, Functional Mobility    Safety Issues Affecting Function (Mobility) at risk behavior observed;awareness of need for assistance;insight into deficits/self awareness;safety precaution awareness;safety precautions follow-through/compliance  -RW      Impairments Affecting Function (Mobility) balance  -RW      Recorded by [RW] Judi Alejandre, RUBINA 05/03/18 0842 05/03/18 0842      Row Name 05/03/18 0830             Bed Mobility Assessment/Treatment    Supine-Sit [place] (Bed Mobility) not tested   Pt up in bathroom  -RW      Sit-Supine [place] (Bed Mobility) independent  -RW      Recorded by [RW] Judi Alejandre PTA 05/03/18 0842 05/03/18 0842      Row Name 05/03/18 0830             Transfer Assessment/Treatment    Transfer Assessment/Treatment sit-stand transfer;stand-sit transfer  -RW      Sit-Stand [place] (Transfers) not tested   Pt up in restroom  -RW      Stand-Sit [place] (Transfers) supervision  -RW      Recorded by [RW] Judi Alejandre, RUBINA 05/03/18 0842 05/03/18 0842      Row Name 05/03/18 0830             Gait/Stairs Assessment/Training    [place] Level (Gait) contact guard;minimum assist (75% patient effort);verbal cues  -RW      Distance in Feet (Gait) 300  -RW      Pattern (Gait) step-through  -RW      Deviations/Abnormal Patterns (Gait) base of support, narrow;ataxic;gait speed decreased  -RW      Comment (Gait/Stairs) 3 LOB during turns requiring Shakeel to correct  -RW      Recorded by [RW] Judi Alejandre, RUBINA 05/03/18 0842 05/03/18 0842      Row Name 05/03/18 0830             Positioning and Restraints    Pre-Treatment Position bathroom  -RW      Post Treatment Position bed  -RW      In Bed supine;call light within reach;encouraged to call for assist;exit alarm on  -RW      Recorded by [RW] Judi Alejandre, PTA 05/03/18 0842 05/03/18 0842      Row Name 05/03/18  0830             Pain Scale: Numbers Pre/Post-Treatment    Pain Scale: Numbers, Pretreatment 0/10 - no pain  -RW      Recorded by [RW] Judi Alejandre PTA 05/03/18 0842 05/03/18 0842      Row Name 05/03/18 0830             Outcome Summary/Treatment Plan (PT)    Anticipated Discharge Disposition (PT) home with home health care  -RW      Recorded by [RW] Judi Alejandre PTA 05/03/18 0842 05/03/18 0842        User Key  (r) = Recorded By, (t) = Taken By, (c) = Cosigned By    Initials Name Effective Dates Discipline     Judi Alejandre PTA 03/07/18 -  PT                     Physical Therapy Education     Title: PT OT SLP Therapies (Active)     Topic: Physical Therapy (Active)     Point: Mobility training (Done)    Learning Progress Summary     Learner Status Readiness Method Response Comment Documented by    Patient Done Acceptance E VU,NR   05/03/18 0839          Point: Body mechanics (Done)    Learning Progress Summary     Learner Status Readiness Method Response Comment Documented by    Patient Done Acceptance E VU,NR   05/03/18 0839          Point: Precautions (Done)    Learning Progress Summary     Learner Status Readiness Method Response Comment Documented by    Patient Done Acceptance E VU,NR   05/03/18 0839                      User Key     Initials Effective Dates Name Provider Type Discipline     03/07/18 -  Judi Alejandre [facility] Physical Therapy Assistant PT                    PT Recommendation and Plan  Anticipated Discharge Disposition (PT): home with home health care  Outcome Summary/Treatment Plan (PT)  Anticipated Discharge Disposition (PT): home with home health care  Plan of Care Reviewed With: patient  Outcome Summary: Pt able to increase ambulation distance, but still unsteady w/ ambulation. Occassionally reaching for counters. 3 LOB occurred during turns while ambulating.           Outcome Measures     Row Name 05/03/18 0830 05/02/18 1530 05/02/18 1400       How much help from another  person do you currently need...    Turning from your back to your side while in flat bed without using bedrails? 4  -RW  -- 4  -PC    Moving from lying on back to sitting on the side of a flat bed without bedrails? 4  -RW  -- 4  -PC    Moving to and from a bed to a chair (including a wheelchair)? 3  -RW  -- 3  -PC    Standing up from a chair using your arms (e.g., wheelchair, bedside chair)? 4  -RW  -- 4  -PC    Climbing 3-5 steps with a railing? 3  -RW  -- 3  -PC    To walk in hospital room? 3  -RW  -- 3  -PC    AM-PAC 6 Clicks Score 21  -RW  -- 21  -PC       How much help from another is currently needed...    Putting on and taking off regular lower body clothing?  -- 3  -KP  --    Bathing (including washing, rinsing, and drying)  -- 3  -KP  --    Toileting (which includes using toilet bed pan or urinal)  -- 3  -KP  --    Putting on and taking off regular upper body clothing  -- 4  -KP  --    Taking care of personal grooming (such as brushing teeth)  -- 4  -KP  --    Eating meals  -- 4  -KP  --    Score  -- 21  -KP  --       Modified [place] Scale    Modified [place] Scale  -- 2 - Slight disability.  Unable to carry out all previous activities but able to look after own affairs without assistance.  -KP  --       Functional Assessment    Outcome Measure Options AM-PAC 6 Clicks Basic Mobility (PT)  -RW AM-PAC 6 Clicks Daily Activity (OT);Modified [place]  -KP AM-PAC 6 Clicks Basic Mobility (PT)  -PC      User Key  (r) = Recorded By, (t) = Taken By, (c) = Cosigned By    Initials Name Provider Type     Beatriz Balderrama, OTR Occupational Therapist    ANNIKA Espinal, PT Physical Therapist    RW Judi Alejandre, PTA Physical Therapy Assistant           Time Calculation:         PT Charges     Row Name 05/03/18 0833             Time Calculation    Start Time 0825 -RW      Stop Time 0833 -RW      Time Calculation (min) 8 min  -RW      PT Received On 05/03/18  -RW      PT - Next Appointment 05/04/18  -RW         User Key  (r) = Recorded By, (t) = Taken By, (c) = Cosigned By    Initials Name Provider Type     Judi Alejandre PTA Physical Therapy Assistant          Therapy Charges for Today     Code Description Service Date Service Provider Modifiers Qty    12616626587 HC PT THER PROC EA 15 MIN 5/3/2018 Judi Alejandre PTA GP 1          PT G-Codes  Outcome Measure Options: AM-PAC 6 Clicks Basic Mobility (PT)    Judi Alejandre PTA  5/3/2018

## 2018-05-03 NOTE — NURSING NOTE
"Diabetes Education  Assessment/Teaching    Patient Name:  Carlito Mo  YOB: 1955  MRN: [id number]  Admit Date:  4/30/2018      Assessment Date:  5/3/2018  Flowsheet Row Most Recent Value   General Information    Referral From: A1c   Height 182.9 cm (72.01\")   Height Method Estimated   Weight 82.6 kg (182 lb 1.6 oz)   Do you test your blood sugar at home? yes   Frequency of checks -- ~every other day per pt   Have you had high blood sugar? (>140mg/dl) yes [based upon current a1C]   Education Preferences   Barriers to Learning -- motivation to change self-care behaviors   Assessment Topics   Reducing Risk - Assessment Needs education   Healthy Coping - Assessment Needs education       Flowsheet Row Most Recent Value   DM Education Needs   Meter Has own   Medication Insulin [Apidra, Lantus rx'd as outpt. ]   Reducing Risks A1C testing [inform pt of current a1C level]   Healthy Coping -- [pt presents w/very flat affect. Pt describes f/u with psychiatry q3 mos. encourage outpt counseling as well.]   Teaching Method Discussion   Patient Response Needs reinforcement          Electronically signed by:  Lindsay Reza, RN, BSN, CDE   05/03/18 10:52 AM  "

## 2018-05-03 NOTE — PLAN OF CARE
Problem: Patient Care Overview  Goal: Plan of Care Review   05/03/18 1717   Coping/Psychosocial   Plan of Care Reviewed With patient   Coping/Psychosocial   Patient Agreement with Plan of Care agrees   Plan of Care Review   Progress improving   OTHER   Outcome Summary VSS. No complaints. Pt has worked with therapies today. Will CTM.

## 2018-05-03 NOTE — THERAPY TREATMENT NOTE
Acute Care - Occupational Therapy Progress Note   [facility]     Patient Name: Carlito Mo  : 1955  MRN: [id number]  Today's Date: 5/3/2018  Onset of Illness/Injury or Date of Surgery: 18     Referring Physician: Maureen    Admit Date: 2018       ICD-10-CM ICD-9-CM   1. Hypertensive encephalopathy syndrome I67.4 437.2   2. Hyperglycemia R73.9 790.29   3. History of medication noncompliance Z91.14 V15.81   4. Difficulty walking R26.2 719.7     Patient Active Problem List   Diagnosis   • Depression   • Hyperlipidemia   • Chronic ischemic heart disease   • Vitamin D deficiency   • Erectile dysfunction   • Chronic fatigue   • Diabetes mellitus   • Skin lesion of chest wall   • Slow transit constipation   • Atherosclerosis of coronary artery   • Benign prostatic hyperplasia   • Chronic kidney disease   • History of basal cell carcinoma   • Essential hypertension   • Acquired hypothyroidism   • Hypertensive urgency   • Cerebrovascular accident (CVA) due to embolism of precerebral artery   • Hypertensive encephalopathy syndrome     Past Medical History:   Diagnosis Date   • Acute sinusitis    • Anemia    • Arthritis    • Cancer     skin   • Chronic ischemic heart disease    • Depression    • Diabetes mellitus type 2, uncontrolled, without complications    • Heart attack    • Hyperlipidemia    • Hypertension    • Screening for prostate cancer    • Stroke    • TIA (transient ischemic attack)    • Type 2 diabetes mellitus    • Vitamin D deficiency      Past Surgical History:   Procedure Laterality Date   • APPENDECTOMY N/A 2016    Dr. Alexey Dodson   • CORONARY ARTERY BYPASS GRAFT  2001       Therapy Treatment          Rehabilitation Treatment Summary     Row Name 18 1155 18 0830          Treatment Time/Intention    Discipline occupational therapist  -SG physical therapy assistant  -RW     Document Type therapy note (daily note)  -SG therapy note (daily note)  -RW     Subjective  Information no complaints  -SG no complaints  -RW     Mode of Treatment occupational therapy  -SG  --     Patient Effort good  -SG good  -RW     Existing Precautions/Restrictions  -- fall  -RW     Recorded by [SG] Heike Louie, OTR 05/03/18 1206 05/03/18 1206 [RW] Judi Alejandre, PTA 05/03/18 0842 05/03/18 0842     Row Name 05/03/18 1155 05/03/18 0830          Cognitive Assessment/Intervention- PT/OT    Orientation Status (Cognition) oriented x 4  -SG oriented x 4  -RW     Follows Commands (Cognition) WFL  -SG WFL  -RW     Safety Deficit (Cognitive)  -- mild deficit;insight into deficits/self awareness;awareness of need for assistance  -RW     Personal Safety Interventions  -- fall prevention program maintained;gait belt;nonskid shoes/slippers when out of bed  -RW     Recorded by [SG] Heike Louie, OTR 05/03/18 1206 05/03/18 1206 [RW] Judi Alejandre, PTA 05/03/18 0842 05/03/18 0842     Row Name 05/03/18 0830             Safety Issues, Functional Mobility    Safety Issues Affecting Function (Mobility) at risk behavior observed;awareness of need for assistance;insight into deficits/self awareness;safety precaution awareness;safety precautions follow-through/compliance  -RW      Impairments Affecting Function (Mobility) balance  -RW      Recorded by [RW] Judi Alejandre, PTA 05/03/18 0842 05/03/18 0842      Row Name 05/03/18 0830             Bed Mobility Assessment/Treatment    Supine-Sit [place] (Bed Mobility) not tested   Pt up in bathroom  -RW      Sit-Supine [place] (Bed Mobility) independent  -RW      Recorded by [RW] Judi Alejandre, PTA 05/03/18 0842 05/03/18 0842      Row Name 05/03/18 0830             Transfer Assessment/Treatment    Transfer Assessment/Treatment sit-stand transfer;stand-sit transfer  -RW      Sit-Stand [place] (Transfers) not tested   Pt up in restroom  -RW      Stand-Sit [place] (Transfers) supervision  -RW      Recorded by [RW] Judi Alejandre, PTA 05/03/18 0842  05/03/18 0842      Row Name 05/03/18 0830             Gait/Stairs Assessment/Training    [place] Level (Gait) contact guard;minimum assist (75% patient effort);verbal cues  -RW      Distance in Feet (Gait) 300  -RW      Pattern (Gait) step-through  -RW      Deviations/Abnormal Patterns (Gait) base of support, narrow;ataxic;gait speed decreased  -RW      Comment (Gait/Stairs) 3 LOB during turns requiring Shakeel to correct  -RW      Recorded by [RW] Judi Alejandre, PTA 05/03/18 0842 05/03/18 0842      Row Name 05/03/18 1155             ADL Assessment/Intervention    BADL Assessment/Intervention --   Pt states has grab bar in shower  -SG      Recorded by [SG] Heike Louie OTR 05/03/18 1206 05/03/18 1206      Row Name 05/03/18 1155             Lower Body Dressing Assessment/Training    Lower Body Dressing [place] Level lower body dressing skills;doff;don;supervision  -SG      Recorded by [SG] FADI Maldonado 05/03/18 1206 05/03/18 1206      Row Name 05/03/18 1155             Static Sitting Balance    Level of [place] (Unsupported Sitting, Static Balance) supervision  -SG      Recorded by [SG] Heike Louie OTR 05/03/18 1206 05/03/18 1206      Row Name 05/03/18 1155 05/03/18 0830          Positioning and Restraints    Pre-Treatment Position in bed  -SG bathroom  -RW     Post Treatment Position bed  -SG bed  -RW     In Bed  -- supine;call light within reach;encouraged to call for assist;exit alarm on  -RW     Recorded by [SG] Heike Louie OTR 05/03/18 1206 05/03/18 1206 [RW] Judi Alejandre, PTA 05/03/18 0842 05/03/18 0842     Row Name 05/03/18 1155 05/03/18 0830          Pain Scale: Numbers Pre/Post-Treatment    Pain Scale: Numbers, Pretreatment 0/10 - no pain  -SG 0/10 - no pain  -RW     Recorded by [SG] Heike Louie OTR 05/03/18 1206 05/03/18 1206 [RW] Judi Alejandre, PTA 05/03/18 0842 05/03/18 0842     Row Name 05/03/18 1155             Plan of Care Review    Plan of Care Reviewed  With patient  -SG      Recorded by [SG] Heike Louie, OTR 05/03/18 1206 05/03/18 1206      Row Name 05/03/18 1155             Outcome Summary/Treatment Plan (OT)    Daily Summary of Progress (OT) progress toward functional goals is good  -SG      Recorded by [SG] Heike Louie, OTR 05/03/18 1206 05/03/18 1206      Row Name 05/03/18 0830             Outcome Summary/Treatment Plan (PT)    Anticipated Discharge Disposition (PT) home with home health care  -RW      Recorded by [RW] Judi Alejandre, PTA 05/03/18 0842 05/03/18 0842        User Key  (r) = Recorded By, (t) = Taken By, (c) = Cosigned By    Initials Name Effective Dates Discipline    SG Heike Louie, OTR 03/07/18 -  OT    RW Judi Alejandre, PTA 03/07/18 -  PT               Occupational Therapy Education     Title: PT OT SLP Therapies (Active)     Topic: Occupational Therapy (Done)     Point: ADL training (Done)     Description: Instruct learner(s) on proper safety adaptation and remediation techniques during self care or transfers.   Instruct in proper use of assistive devices.   Learning Progress Summary     Learner Status Readiness Method Response Comment Documented by    Patient Done Acceptance E,D NR,VU,DU ed pt on role of OT. ed on POC w. OT. ed on safety w. ADLs and tsf. pt a little unsteady but also very tired and was just woken up by therapist. pt aware of why he is here  05/02/18 1528          Point: Precautions (Done)     Description: Instruct learner(s) on prescribed precautions during self-care and functional transfers.   Learning Progress Summary     Learner Status Readiness Method Response Comment Documented by    Patient Done Acceptance E,D NR,VU,DU ed pt on role of OT. ed on POC w. OT. ed on safety w. ADLs and tsf. pt a little unsteady but also very tired and was just woken up by therapist. pt aware of why he is here  05/02/18 1528          Point: Body mechanics (Done)     Description: Instruct learner(s) on proper positioning and  spine alignment during self-care, functional mobility activities and/or exercises.   Learning Progress Summary     Learner Status Readiness Method Response Comment Documented by    Patient Done Acceptance E,D NR,JEAN,EBONI ed pt on role of OT. ed on POC w. OT. ed on safety w. ADLs and tsf. pt a little unsteady but also very tired and was just woken up by therapist. pt aware of why he is here  05/02/18 1528                      User Key     Initials Effective Dates Name Provider Type Discipline     04/13/15 -  Beatriz Balderrama, OTR Occupational Therapist OT                OT Recommendation and Plan  Outcome Summary/Treatment Plan (OT)  Daily Summary of Progress (OT): progress toward functional goals is good  Daily Summary of Progress (OT): progress toward functional goals is good  Plan of Care Review  Plan of Care Reviewed With: patient  Plan of Care Reviewed With: patient  Outcome Summary: Pt making progress for safety and independence for adls        Outcome Measures     Row Name 05/03/18 1208 05/03/18 0830 05/02/18 1530       How much help from another person do you currently need...    Turning from your back to your side while in flat bed without using bedrails?  -- 4  -RW  --    Moving from lying on back to sitting on the side of a flat bed without bedrails?  -- 4  -RW  --    Moving to and from a bed to a chair (including a wheelchair)?  -- 3  -RW  --    Standing up from a chair using your arms (e.g., wheelchair, bedside chair)?  -- 4  -RW  --    Climbing 3-5 steps with a railing?  -- 3  -RW  --    To walk in hospital room?  -- 3  -RW  --    AM-PAC 6 Clicks Score  -- 21  -RW  --       How much help from another is currently needed...    Putting on and taking off regular lower body clothing? 3  -SG  -- 3  -KP    Bathing (including washing, rinsing, and drying) 3  -SG  -- 3  -KP    Toileting (which includes using toilet bed pan or urinal) 3  -SG  -- 3  -KP    Putting on and taking off regular upper body  clothing 3  -SG  -- 4  -KP    Taking care of personal grooming (such as brushing teeth) 3  -SG  -- 4  -KP    Eating meals 4  -SG  -- 4  -KP    Score 19  -SG  -- 21  -KP       Modified [place] Scale    Modified [place] Scale  --  -- 2 - Slight disability.  Unable to carry out all previous activities but able to look after own affairs without assistance.  -       Functional Assessment    Outcome Measure Options  -- AM-PAC 6 Clicks Basic Mobility (PT)  -RW AM-PAC 6 Clicks Daily Activity (OT);Modified [place]  -KP    Row Name 05/02/18 1400             How much help from another person do you currently need...    Turning from your back to your side while in flat bed without using bedrails? 4  -PC      Moving from lying on back to sitting on the side of a flat bed without bedrails? 4  -PC      Moving to and from a bed to a chair (including a wheelchair)? 3  -PC      Standing up from a chair using your arms (e.g., wheelchair, bedside chair)? 4  -PC      Climbing 3-5 steps with a railing? 3  -PC      To walk in hospital room? 3  -PC      AM-PAC 6 Clicks Score 21  -PC         Functional Assessment    Outcome Measure Options AM-PAC 6 Clicks Basic Mobility (PT)  -PC        User Key  (r) = Recorded By, (t) = Taken By, (c) = Cosigned By    Initials Name Provider Type    FLORENTINO Louie OTR Occupational Therapist    MARIBEL Balderrama, OTR Occupational Therapist    PC Denise Espinal, PT Physical Therapist    RW Judi Alejandre, PTA Physical Therapy Assistant           Time Calculation:         Time Calculation- OT     Row Name 05/03/18 1208             Time Calculation- OT    OT Start Time 0844  -      OT Stop Time 0901  -      OT Time Calculation (min) 17 min  -      OT Received On 05/03/18  -        User Key  (r) = Recorded By, (t) = Taken By, (c) = Cosigned By    Initials Name Provider Type    FLORENTINO Louie OTR Occupational Therapist           Therapy Charges for Today     Code Description Service Date  Service Provider Modifiers Qty    90326211335 HC OT SELF CARE/MGMT/TRAIN EA 15 MIN 5/3/2018 Heike Louie, OTR GO 1               Heike Louie OTR  5/3/2018

## 2018-05-04 VITALS
TEMPERATURE: 97.7 F | HEART RATE: 60 BPM | HEIGHT: 72 IN | BODY MASS INDEX: 24.46 KG/M2 | SYSTOLIC BLOOD PRESSURE: 143 MMHG | DIASTOLIC BLOOD PRESSURE: 80 MMHG | OXYGEN SATURATION: 96 % | RESPIRATION RATE: 20 BRPM | WEIGHT: 180.56 LBS

## 2018-05-04 PROBLEM — Z91.14 HISTORY OF MEDICATION NONCOMPLIANCE: Status: ACTIVE | Noted: 2018-05-04

## 2018-05-04 PROBLEM — Z91.148 HISTORY OF MEDICATION NONCOMPLIANCE: Status: RESOLVED | Noted: 2018-05-04 | Resolved: 2018-05-04

## 2018-05-04 PROBLEM — R73.9 HYPERGLYCEMIA: Status: ACTIVE | Noted: 2018-05-04

## 2018-05-04 PROBLEM — R73.9 HYPERGLYCEMIA: Status: RESOLVED | Noted: 2018-05-04 | Resolved: 2018-05-04

## 2018-05-04 PROBLEM — I67.4 HYPERTENSIVE ENCEPHALOPATHY SYNDROME: Status: RESOLVED | Noted: 2018-04-30 | Resolved: 2018-05-04

## 2018-05-04 PROBLEM — Z91.148 HISTORY OF MEDICATION NONCOMPLIANCE: Status: ACTIVE | Noted: 2018-05-04

## 2018-05-04 PROBLEM — Z91.14 HISTORY OF MEDICATION NONCOMPLIANCE: Status: RESOLVED | Noted: 2018-05-04 | Resolved: 2018-05-04

## 2018-05-04 LAB
ACANTHOCYTES BLD QL SMEAR: NORMAL
ANION GAP SERPL CALCULATED.3IONS-SCNC: 10.9 MMOL/L
BASOPHILS # BLD AUTO: 0.01 10*3/MM3 (ref 0–0.2)
BASOPHILS NFR BLD AUTO: 0.2 % (ref 0–1.5)
BUN BLD-MCNC: 20 MG/DL (ref 8–23)
BUN/CREAT SERPL: 17.7 (ref 7–25)
CALCIUM SPEC-SCNC: 8.6 MG/DL (ref 8.6–10.5)
CHLORIDE SERPL-SCNC: 105 MMOL/L (ref 98–107)
CO2 SERPL-SCNC: 23.1 MMOL/L (ref 22–29)
CREAT BLD-MCNC: 1.13 MG/DL (ref 0.76–1.27)
DEPRECATED RDW RBC AUTO: 43 FL (ref 37–54)
EOSINOPHIL # BLD AUTO: 0.29 10*3/MM3 (ref 0–0.7)
EOSINOPHIL NFR BLD AUTO: 5.3 % (ref 0.3–6.2)
ERYTHROCYTE [DISTWIDTH] IN BLOOD BY AUTOMATED COUNT: 14.2 % (ref 11.5–14.5)
GFR SERPL CREATININE-BSD FRML MDRD: 66 ML/MIN/1.73
GLUCOSE BLD-MCNC: 81 MG/DL (ref 65–99)
GLUCOSE BLDC GLUCOMTR-MCNC: 240 MG/DL (ref 70–130)
GLUCOSE BLDC GLUCOMTR-MCNC: 78 MG/DL (ref 70–130)
HCT VFR BLD AUTO: 33.4 % (ref 40.4–52.2)
HGB BLD-MCNC: 10.2 G/DL (ref 13.7–17.6)
IMM GRANULOCYTES # BLD: 0.03 10*3/MM3 (ref 0–0.03)
IMM GRANULOCYTES NFR BLD: 0.6 % (ref 0–0.5)
LARGE PLATELETS: NORMAL
LYMPHOCYTES # BLD AUTO: 1.87 10*3/MM3 (ref 0.9–4.8)
LYMPHOCYTES NFR BLD AUTO: 34.4 % (ref 19.6–45.3)
MCH RBC QN AUTO: 25.1 PG (ref 27–32.7)
MCHC RBC AUTO-ENTMCNC: 30.5 G/DL (ref 32.6–36.4)
MCV RBC AUTO: 82.3 FL (ref 79.8–96.2)
MONOCYTES # BLD AUTO: 0.46 10*3/MM3 (ref 0.2–1.2)
MONOCYTES NFR BLD AUTO: 8.5 % (ref 5–12)
NEUTROPHILS # BLD AUTO: 2.8 10*3/MM3 (ref 1.9–8.1)
NEUTROPHILS NFR BLD AUTO: 51.6 % (ref 42.7–76)
PLATELET # BLD AUTO: 225 10*3/MM3 (ref 140–500)
PMV BLD AUTO: 10.9 FL (ref 6–12)
POTASSIUM BLD-SCNC: 4.7 MMOL/L (ref 3.5–5.2)
RBC # BLD AUTO: 4.06 10*6/MM3 (ref 4.6–6)
SCHISTOCYTES BLD QL SMEAR: NORMAL
SODIUM BLD-SCNC: 139 MMOL/L (ref 136–145)
WBC MORPH BLD: NORMAL
WBC NRBC COR # BLD: 5.43 10*3/MM3 (ref 4.5–10.7)

## 2018-05-04 PROCEDURE — 25010000002 ENOXAPARIN PER 10 MG: Performed by: INTERNAL MEDICINE

## 2018-05-04 PROCEDURE — 80048 BASIC METABOLIC PNL TOTAL CA: CPT | Performed by: INTERNAL MEDICINE

## 2018-05-04 PROCEDURE — 85025 COMPLETE CBC W/AUTO DIFF WBC: CPT | Performed by: INTERNAL MEDICINE

## 2018-05-04 PROCEDURE — 63710000001 INSULIN ASPART PER 5 UNITS: Performed by: INTERNAL MEDICINE

## 2018-05-04 PROCEDURE — 82962 GLUCOSE BLOOD TEST: CPT

## 2018-05-04 PROCEDURE — 63710000001 INSULIN DETEMER PER 5 UNITS: Performed by: INTERNAL MEDICINE

## 2018-05-04 PROCEDURE — 85007 BL SMEAR W/DIFF WBC COUNT: CPT | Performed by: INTERNAL MEDICINE

## 2018-05-04 RX ADMIN — Medication 1000 MCG: at 08:43

## 2018-05-04 RX ADMIN — METOPROLOL TARTRATE 100 MG: 100 TABLET ORAL at 08:43

## 2018-05-04 RX ADMIN — CLOPIDOGREL 75 MG: 75 TABLET, FILM COATED ORAL at 08:43

## 2018-05-04 RX ADMIN — ATORVASTATIN CALCIUM 80 MG: 80 TABLET, FILM COATED ORAL at 08:43

## 2018-05-04 RX ADMIN — ASPIRIN 81 MG: 81 TABLET ORAL at 08:43

## 2018-05-04 RX ADMIN — AMLODIPINE BESYLATE 5 MG: 5 TABLET ORAL at 08:43

## 2018-05-04 RX ADMIN — ENOXAPARIN SODIUM 40 MG: 40 INJECTION SUBCUTANEOUS at 11:27

## 2018-05-04 RX ADMIN — LEVOTHYROXINE SODIUM 50 MCG: 50 TABLET ORAL at 06:26

## 2018-05-04 RX ADMIN — DOCUSATE SODIUM 100 MG: 100 CAPSULE, LIQUID FILLED ORAL at 08:43

## 2018-05-04 RX ADMIN — INSULIN DETEMIR 10 UNITS: 100 INJECTION, SOLUTION SUBCUTANEOUS at 00:17

## 2018-05-04 RX ADMIN — INSULIN ASPART 4 UNITS: 100 INJECTION, SOLUTION INTRAVENOUS; SUBCUTANEOUS at 11:27

## 2018-05-04 NOTE — NURSING NOTE
Received referral through stroke order set. Based on evals, no determined need for inpatient rehab. Will sign off. Thanks, Nahed RN rehab admission nurse [phone]

## 2018-05-04 NOTE — PROGRESS NOTES
Continued Stay Note   [facility]     Patient Name: Carlito Mo  MRN: [id number]  Today's Date: 5/4/2018    Admit Date: 4/30/2018          Discharge Plan     Row Name 05/04/18 1215       Plan    Plan Home with wife, denies any dc needs or concerns    Patient/Family in Agreement with Plan yes    Plan Comments Spoke with Nancy/Ravinder and she states pt does not have insurance and it medicaid pending pts submission of documents to determine eligability. Nancy states pt does not even have PE benefits. CCP spoke with pt and explained, he states she gave him a letter with a stamp on it and will complete when he returns home. Pt stated his wife could assist him. Pt states he has not had insurance since the beginning of the year but has been able to afford his home medications including insulin etc. Pt denies PT or HH need. Pt states wife will drive home. Explained to pt how important submitting his documents are for insurance registration. Nannette BENZ/CCP              Discharge Codes    No documentation.       Expected Discharge Date and Time     Expected Discharge Date Expected Discharge Time    May 4, 2018             Jody Martins RN

## 2018-05-04 NOTE — SIGNIFICANT NOTE
05/04/18 1136   Rehab Treatment   Discipline physical therapy assistant   Reason Treatment Not Performed other (see comments)  (Pt being discharged home. No question or concerns for PT at this time. )

## 2018-05-04 NOTE — PLAN OF CARE
Problem: Patient Care Overview  Goal: Plan of Care Review   05/04/18 0433   Coping/Psychosocial   Plan of Care Reviewed With patient   Plan of Care Review   Progress no change   OTHER   Outcome Summary Pt denies any pain or discomfort.Pt IV was out last night.Called and talked to Dr Castañeda and ok not to replace IV.Possible d/c today.Will continue to monitor.

## 2018-05-10 DIAGNOSIS — E78.49 OTHER HYPERLIPIDEMIA: ICD-10-CM

## 2018-05-10 DIAGNOSIS — E55.9 VITAMIN D DEFICIENCY: ICD-10-CM

## 2018-05-10 DIAGNOSIS — E03.9 ACQUIRED HYPOTHYROIDISM: Primary | ICD-10-CM

## 2018-05-10 DIAGNOSIS — E11.3399 TYPE 2 DIABETES MELLITUS WITH MODERATE NONPROLIFERATIVE RETINOPATHY WITHOUT MACULAR EDEMA, WITH LONG-TERM CURRENT USE OF INSULIN, UNSPECIFIED LATERALITY (HCC): ICD-10-CM

## 2018-05-10 DIAGNOSIS — Z79.4 TYPE 2 DIABETES MELLITUS WITH MODERATE NONPROLIFERATIVE RETINOPATHY WITHOUT MACULAR EDEMA, WITH LONG-TERM CURRENT USE OF INSULIN, UNSPECIFIED LATERALITY (HCC): ICD-10-CM

## 2018-05-22 NOTE — DISCHARGE SUMMARY
"                                                                  PHYSICIAN DISCHARGE SUMMARY                                                                          [facility]      Patient Identification:    Name: Carlito Mo  Age: 62 y.o.  Sex: male  :  1955  MRN: [id number]    Admit date: 2018    Discharge date 2018    Discharged Condition: Stable    Discharge Diagnoses:    Acute on chronic b/l hemispheric CVA             Hypertensive encephalopathy  Hyperglycemia  Medical noncompliance  SYLVAIN on CKD  H/o cardioembolic CVA()  CAD status post CABG in     HPI  \" 62-year-old gentleman history of diabetes mellitus coronary artery disease and CVA presented to the emergency room with altered mental status.  Patient currently unable to give me any meaningful history.  Per records patient was agitated at home and per EMS patient has had history of multiple similar visit due to noncompliance of his medications.  No family at bedside.  In the emergency room patient was noted to be in hypertensive encephalopathy.  No history of aspiration or neck pain was reported\"    Consults:   Patient Care Team:  García Rehman MD as PCP - General (Family Medicine)  García Rehman MD as PCP - Family Medicine    Procedures Performed:         Hospital Course:     Patient was evaluated with the help of cardiology and neurology. He was recommended to be on anticoagulation by cardiology, but neurology felt that dual antiplatelet therapy with aggressive risk factor management would be appropriate.  Given that cardiology has deferred the final decision to neurology.  Patient was discharged on dual antiplatelet medication.  He has no obvious source of cardioembolic phenomenon.  He will be discharged on patch for concerns of undiagnosed atrial fibrillation.  He has recurrent admissions for noncompliance of medications.  He says that he has been busy taking care of his son who has autism spectrum disorder.  He " now understands the importance of being healthy to be able to get for him.  He'll be discharged and follow up with his primary care as well as cardiology and neurology.    Objective:          on       No intake or output data in the 24 hours ending 05/22/18 0901  Body mass index is 24.48 kg/m².  1    05/03/18  0500 05/03/18  0808 05/04/18  0505   Weight: 82.6 kg (182 lb 1.6 oz) 82.6 kg (182 lb 1.6 oz) 81.9 kg (180 lb 8.9 oz)     Weight change:       Physical Exam:  GEN:               No respiratory distress, appears stated age  EYES:              EOM-i, anicteric sclera bilat  ENT:                External ears/nose normal, OP clear  NECK:             Supple, midline trachea, No JVD or cervical LApathy  LUNGS:           Bilateral breath sounds heard, Dec BS @ bases  CV:                  Regular rate and rhythm, No murmur  ABD:                Non tender, no distended, no palpable liver or masses  EXT:                No cyanosis or clubbing, No peripheral/sacral edema      Significant Discharge Diagnostics     Pertinent Lab Results:                            Invalid input(s): LDLCALC                                            Imaging Results:  Imaging Results (all)     Procedure Component Value Units Date/Time    MRI Brain Without Contrast [[id number]] Collected:  05/01/18 1914     Updated:  05/01/18 1930    Narrative:       MRI BRAIN WITHOUT CONTRAST     HISTORY: 62-year-old male with agitation. Patient is combative and  confused.     TECHNIQUE: Brain MRI without contrast includes sagittal T1 as well as  axial diffusion, FLAIR, T1, T2, gradient echo sequences.     COMPARISON: Head CT without contrast 04/30/2018, MRI brain without  contrast 02/17/2018, 12/02/2017.     FINDINGS: Within the central superior left frontal lobe white matter  there is an 8 mm focus of restricted diffusion that is new when compared  to the previous MRI of 02/17/2018. Within the left superior, medial  occipital lobe there is a 5 mm focus  restricted diffusion consistent  with an acute infarction. Within the right inferolateral parietal lobe  there is a 5 mm focus restricted diffusion consistent with an acute  infarction. There has been continued evolution of the infarction  demonstrated to be acute on previous brain MRI 02/17/2018. Within the  medial right cerebellar hemisphere there is a very faint focus  restricted diffusion measuring 6 mm consistent with a subacute  infarction. This is anterior to a chronic area of encephalomalacia that  measures 10 mm consistent with a chronic infarction. Additionally,  within the left parietal lobe there is a small cortical area of faint  restricted diffusion consistent with subacute infarction. There is  extensive confluent periventricular and patchy deep cerebral white  matter FLAIR hyperintense signal that is consistent with chronic small  vessel ischemic white matter change. There is no evidence for mass  effect or shift of midline structures. Ventricles are not changed in  size or configuration. No extra-axial fluid collection is evident.     Two small left parietal and single right frontal scalp and right  posterior suboccipital nodules are likely sebaceous cyst and are without  change. Mastoid air cells appear clear. A right maxillary sinus mucus  retention cyst measures 17 x 13 mm.       Impression:       1. Subcentimeter acute bilateral cerebral hemisphere infarctions. There  are additional cerebral and cerebellar infarctions of various ages with  some appearing subacute and some chronic and there has been evolution of  the small infarctions that were demonstrated to be acute on previous  brain MRI 02/17/2018. Infarcts are in various vascular territories  supporting the presence of embolic phenomena.  2. Chronic small vessel ischemic white matter change similar to the  previous exam.     Discussed with Ellie nurse caring for the patient on 05/01/2018 at 7  PM.     This report was finalized on 5/1/2018  7:27 PM by Dr. Keon Santoyo M.D..       CT Head Without Contrast [[id number]] Collected:  04/30/18 2141     Updated:  05/01/18 1244    Narrative:       CT HEAD WITHOUT CONTRAST     HISTORY: Encephalopathy.     TECHNIQUE: A noncontrasted CT examination of the brain was performed.     FINDINGS: The brain and ventricles are symmetrical. There is no evidence  of intracranial hemorrhage, hydrocephalus or of abnormal extra-axial  fluid. No focal area of decreased attenuation to suggest acute  infarction is identified. Areas of decreased attenuation are appreciated  involving the white matter of the cerebral bilaterally, nonspecific, but  suggesting small vessels changes. Partially calcified nodules are  identified involving the soft tissues of the scalp and the left parietal  region posterolaterally.       Impression:       No acute process identified. See above. Further evaluation  could be performed with a MRI examination of brain as indicated.                 Radiation dose reduction techniques were utilized, including automated  exposure control and exposure modulation based on body size.     This report was finalized on 5/1/2018 12:41 PM by Dr. Delmar Yuen MD.             Discharge Medications and Instructions:     Discharge Medications   [facility] Medication Instructions LICHA:[id number]    Printed on:05/22/18 0901   Medication Information                      amLODIPine (NORVASC) 5 MG tablet  Take 1 tablet by mouth Daily for 180 days.             aspirin 81 MG EC tablet  Take 1 tablet by mouth Daily.             Atorvastatin Calcium (LIPITOR PO)  Take  by mouth.             buPROPion XL (WELLBUTRIN XL) 150 MG 24 hr tablet  Take 1 tablet by mouth Daily for 180 days.             clopidogrel (PLAVIX) 75 MG tablet  Take 1 tablet by mouth Daily.             docusate sodium (COLACE) 100 MG capsule  Take 1 capsule by mouth 2 (two) times a day.             FEXOFENADINE HCL PO  Take  by mouth.              insulin aspart (novoLOG FLEXPEN) 100 UNIT/ML solution pen-injector sc pen  150-199 mg/dL- 3 units  200-249 mg/dL- 5 units  250-299 mg/dL- 8 units  300-349 mg/dL- 10 units  350-400 mg/dL- 12 units  > 400 - call MD             Insulin Glargine (LANTUS SOLOSTAR) 100 UNIT/ML injection pen  Inject 20 Units under the skin Every Night.             Insulin Pen Needle (B-D ULTRAFINE III SHORT PEN) 31G X 8 MM misc  Use to inject 4x daily             ipratropium-albuterol (COMBIVENT RESPIMAT)  MCG/ACT inhaler  Inhale 1 puff.             levothyroxine (SYNTHROID) 50 MCG tablet  Take 1 tablet by mouth Daily.             metoprolol tartrate (LOPRESSOR) 100 MG tablet  Take 1 tablet by mouth Every 12 (Twelve) Hours.             ondansetron ODT (ZOFRAN-ODT) 4 MG disintegrating tablet  Take 1 tablet by mouth Every 6 (Six) Hours As Needed for Nausea or Vomiting.             RELION GLUCOSE TEST STRIPS test strip  1 each by Other route 2 (two) times a day. Use as instructed             SAXagliptin-MetFORMIN ER 2.5-1000 MG tablet sustained-release 24 hour  Take 2 tablets by mouth Daily. Pt taking 50/100 - 2 tablets with evening meal             valsartan (DIOVAN) 320 MG tablet  Take 1 tablet by mouth Daily for 180 days.             vitamin D (ERGOCALCIFEROL) 64788 units capsule capsule  Take 1 cap 3 times weekly                 Disposition:  Home    Follow-up Information     JI Acosta Follow up in 1 month(s).    Specialty:  Neurology  Contact information:  [address]  [facility] [id number] [phone]             García Rehman MD. Schedule an appointment as soon as possible for a visit in 1 week(s).    Specialty:  Family Medicine  Contact information:  [address] [facility] [address]  [facility] [id number] [phone]             Amber Murguia MD. Schedule an appointment as soon as possible for a visit in 1 month(s).    Specialty:  Cardiology  Contact information:  [address] [facility]  [address]  [place] [address] [phone]             Rachael Ramos MD. Schedule an appointment as soon as possible for a visit in 1 month(s).    Specialty:  Endocrinology  Why:  DM management  Contact information:  [address] [facility] [address]  [place] [address] [phone]                   Total time spent discharging patient including evaluation, medication reconciliation, arranging follow up, and post hospitalization instructions and education total time exceeds 30 minutes.    Signed:  Jorge Shukla MD  5/22/2018  9:01 AM

## 2018-06-10 ENCOUNTER — RESULTS ENCOUNTER (OUTPATIENT)
Dept: ENDOCRINOLOGY | Age: 63
End: 2018-06-10

## 2018-06-10 DIAGNOSIS — E11.3399 TYPE 2 DIABETES MELLITUS WITH MODERATE NONPROLIFERATIVE RETINOPATHY WITHOUT MACULAR EDEMA, WITH LONG-TERM CURRENT USE OF INSULIN, UNSPECIFIED LATERALITY (HCC): ICD-10-CM

## 2018-06-10 DIAGNOSIS — E55.9 VITAMIN D DEFICIENCY: ICD-10-CM

## 2018-06-10 DIAGNOSIS — Z79.4 TYPE 2 DIABETES MELLITUS WITH MODERATE NONPROLIFERATIVE RETINOPATHY WITHOUT MACULAR EDEMA, WITH LONG-TERM CURRENT USE OF INSULIN, UNSPECIFIED LATERALITY (HCC): ICD-10-CM

## 2018-06-10 DIAGNOSIS — E03.9 ACQUIRED HYPOTHYROIDISM: ICD-10-CM

## 2018-06-10 DIAGNOSIS — E78.49 OTHER HYPERLIPIDEMIA: ICD-10-CM

## 2018-06-12 RX ORDER — ATORVASTATIN CALCIUM 80 MG/1
TABLET, FILM COATED ORAL
Qty: 90 TABLET | Refills: 4 | OUTPATIENT
Start: 2018-06-12

## 2018-07-02 DIAGNOSIS — E78.5 HYPERLIPIDEMIA: ICD-10-CM

## 2018-07-02 RX ORDER — ATORVASTATIN CALCIUM 40 MG/1
TABLET, FILM COATED ORAL
Qty: 30 TABLET | Refills: 4 | OUTPATIENT
Start: 2018-07-02

## 2018-07-03 RX ORDER — LOSARTAN POTASSIUM 100 MG/1
TABLET ORAL
Qty: 30 TABLET | Refills: 4 | OUTPATIENT
Start: 2018-07-03

## 2018-07-31 ENCOUNTER — OFFICE VISIT (OUTPATIENT)
Dept: CARDIOLOGY | Facility: CLINIC | Age: 63
End: 2018-07-31

## 2018-07-31 ENCOUNTER — TELEPHONE (OUTPATIENT)
Dept: CARDIOLOGY | Facility: CLINIC | Age: 63
End: 2018-07-31

## 2018-07-31 VITALS
RESPIRATION RATE: 16 BRPM | SYSTOLIC BLOOD PRESSURE: 152 MMHG | BODY MASS INDEX: 24.64 KG/M2 | WEIGHT: 181.9 LBS | HEIGHT: 72 IN | HEART RATE: 82 BPM | DIASTOLIC BLOOD PRESSURE: 100 MMHG

## 2018-07-31 DIAGNOSIS — I25.10 ATHEROSCLEROSIS OF CORONARY ARTERY OF NATIVE HEART WITHOUT ANGINA PECTORIS, UNSPECIFIED VESSEL OR LESION TYPE: ICD-10-CM

## 2018-07-31 DIAGNOSIS — I25.9 CHRONIC ISCHEMIC HEART DISEASE: ICD-10-CM

## 2018-07-31 DIAGNOSIS — I10 ESSENTIAL HYPERTENSION: Primary | ICD-10-CM

## 2018-07-31 DIAGNOSIS — E78.49 OTHER HYPERLIPIDEMIA: ICD-10-CM

## 2018-07-31 PROCEDURE — 93000 ELECTROCARDIOGRAM COMPLETE: CPT | Performed by: INTERNAL MEDICINE

## 2018-07-31 PROCEDURE — 99214 OFFICE O/P EST MOD 30 MIN: CPT | Performed by: INTERNAL MEDICINE

## 2018-07-31 RX ORDER — ATORVASTATIN CALCIUM 40 MG/1
40 TABLET, FILM COATED ORAL NIGHTLY
Qty: 30 TABLET | Refills: 11 | Status: SHIPPED | OUTPATIENT
Start: 2018-07-31 | End: 2018-08-09 | Stop reason: SDUPTHER

## 2018-07-31 RX ORDER — LOSARTAN POTASSIUM 100 MG/1
100 TABLET ORAL DAILY
Qty: 30 TABLET | Refills: 11 | Status: SHIPPED | OUTPATIENT
Start: 2018-07-31 | End: 2018-08-09 | Stop reason: SDUPTHER

## 2018-07-31 NOTE — PROGRESS NOTES
PATIENTINFORMATION    Date of Office Visit: 2018  Encounter Provider: Amber Murguia MD  Place of Service: [facility] CARDIOLOGY  Patient Name: Carlito Mo  : 1955    Subjective:     Encounter Date:2018      Patient ID: Carlito Mo is a 63 y.o. male.      History of Present Illness    Mr. Carlito Mo is a 62 year old male patient with a history of CAD/MI (CABG ), HTN, hyperlipidemia, type 2 diabetes mellitus, anemia, chronic kidney disease, and basal cell carcinoma.  I first saw him in 2017 when he was admitted with altered mental status.  He was diagnosed with multiple small acute infarcts.  His echocardiogram from 2017 showed mild left ventricular hypertrophy, mild left atrial enlargement, negative bubble study, normal LV systolic function with an ejection fraction of 68%.  Transesophageal echo in 2017 showed that his LV function was mildly low with an ejection fraction of 45% with focal wall motion abnormalities primarily in the septum and anterior wall and apex.  Small patent foramen ovale was noted there was no significant valvular heart disease or cardiac source of embolus minus the small PFO.  Holter monitor showed a couple of short bursts of SVT but no sustained arrhythmia.     He came back to the hospital in 2017 again with altered mental status.  At this time he had an echocardiogram which shows ejection fraction was 63%, grade 1 diastolic dysfunction and no significant valvular heart disease.  He wore a Zio patch which showed PACs, PVCs and nonsustained SVT.    He was back in the hospital in May 2018 with acute mental status changes and a blood pressure of 212/109.  He was noted at that time to have a chronically elevated troponin without acute EKG changes.    He comes in today for follow-up.  If the first time I've seen him in the office.  His biggest complaint is fatigue.  His wife thinks he may be depressed.  He is  "also not sleeping well at night.  She says at home he is mostly in bed.  He will go on outings with her but then usually uses a cart when he's at the store.  He says he short of breath at rest and with exertion.  He can't think of any exacerbating factors.  He denies any chest pain or palpitations. He has some chronic left leg swelling but no acute edema.    Review of Systems   Constitution: Positive for malaise/fatigue. Negative for fever, weight gain and weight loss.   HENT: Negative for ear pain, hearing loss, nosebleeds and sore throat.    Eyes: Negative for double vision, pain, vision loss in left eye and vision loss in right eye.   Cardiovascular:        See history of present illness.   Respiratory: Negative for cough, shortness of breath, sleep disturbances due to breathing, snoring and wheezing.    Endocrine: Negative for cold intolerance, heat intolerance and polyuria.   Skin: Negative for itching, poor wound healing and rash.   Musculoskeletal: Negative for joint pain, joint swelling and myalgias.   Gastrointestinal: Negative for abdominal pain, diarrhea, hematochezia, nausea and vomiting.   Genitourinary: Negative for hematuria and hesitancy.   Neurological: Negative for numbness, paresthesias and seizures.   Psychiatric/Behavioral: Negative for depression. The patient is not nervous/anxious.            ECG 12 Lead  Date/Time: 7/31/2018 1:34 PM  Performed by: LUDIN BROWN  Authorized by: LUDIN BROWN   Comparison: compared with previous ECG from 4/18/2018  Rhythm: sinus rhythm  BPM: 82  Conduction: right bundle branch block  Clinical impression: abnormal ECG               Objective:     /100 (BP Location: Right arm, Patient Position: Sitting, Cuff Size: Adult)   Pulse 82   Resp 16   Ht 182.9 cm (72\")   Wt 82.5 kg (181 lb 14.4 oz)   BMI 24.67 kg/m²  Body mass index is 24.67 kg/m².     Physical Exam   Constitutional: He appears well-developed.   HENT:   Head: Normocephalic and " atraumatic.   Eyes: Pupils are equal, round, and reactive to light. Conjunctivae and lids are normal. Lids are everted and swept, no foreign bodies found.   Neck: Normal range of motion. No JVD present. Carotid bruit is not present. No tracheal deviation present. No thyroid mass present.   Cardiovascular: Normal rate, regular rhythm and normal heart sounds.    Pulses:       Dorsalis pedis pulses are 2+ on the right side, and 2+ on the left side.   Pulmonary/Chest: Effort normal and breath sounds normal.   Abdominal: Normal appearance and bowel sounds are normal.   Musculoskeletal: Normal range of motion.   Neurological: He is alert. He has normal strength.   Skin: Skin is warm, dry and intact.   Psychiatric: He has a normal mood and affect. His behavior is normal.   Vitals reviewed.          Assessment/Plan:       1.  Chronically elevated troponin.  No acute changes on EKG.  2.  History of stroke.  Transesophageal echocardiogram did not show cardiac source of embolus.  Zio patch did not show atrial fibrillation.  3.  History of coronary artery disease with remote history of bypass surgery in 2001.  Coronary Artery Disease  Assessment  • The patient has no angina    Plan  • Lifestyle modifications discussed include adhering to a heart healthy diet, medication compliance and regular exercise    Subjective - Objective  • There is a history of previous coronary artery bypass graft on or around 1/1/2001  • Current antiplatelet therapy includes aspirin 81 mg and clopidogrel 75 mg      3.  Hypertension.  His blood pressure here is elevated at home generally runs 110 over 160s.  4.  Hyperlipidemia.  He has not been taking his atorvastatin at home.  I refilled it.  5.  Diabetes  6.  Chronic kidney disease  7.  Fatigue and lack of energy.  I strongly encouraged him to try to get more activity in his life and move around more.  I also encouraged him to call Dr. Rehman as I am concerned about depression.  8.  Right bundle  branch block    He will follow up in 6 months with [facility].    Orders Placed This Encounter   Procedures   • ECG 12 Lead     This order was created via procedure documentation        Discharge Medications          Accurate as of 7/31/18  2:20 PM. If you have any questions, ask your nurse or doctor.               New Medications      Instructions Start Date   losartan 100 MG tablet  Commonly known as:  COZAAR  Started by:  Amber Murguia MD   100 mg, Oral, Daily         Changes to Medications      Instructions Start Date   atorvastatin 40 MG tablet  Commonly known as:  LIPITOR  What changed:  · medication strength  · how much to take  · when to take this  Changed by:  Amber Murguia MD   40 mg, Oral, Nightly      docusate sodium 100 MG capsule  Commonly known as:  COLACE  What changed:  when to take this   100 mg, Oral, 2 Times Daily         Continue These Medications      Instructions Start Date   amLODIPine 5 MG tablet  Commonly known as:  NORVASC   5 mg, Oral, Daily      aspirin 81 MG EC tablet   81 mg, Oral, Daily      buPROPion  MG 24 hr tablet  Commonly known as:  WELLBUTRIN XL   150 mg, Oral, Daily      clopidogrel 75 MG tablet  Commonly known as:  PLAVIX   75 mg, Oral, Daily      FEXOFENADINE HCL PO   Oral      insulin aspart 100 UNIT/ML solution pen-injector sc pen  Commonly known as:  novoLOG FLEXPEN   150-199 mg/dL- 3 units 200-249 mg/dL- 5 units 250-299 mg/dL- 8 units 300-349 mg/dL- 10 units 350-400 mg/dL- 12 units > 400 - call MD      Insulin Glargine 100 UNIT/ML injection pen  Commonly known as:  LANTUS SOLOSTAR   20 Units, Subcutaneous, Nightly      Insulin Pen Needle 31G X 8 MM misc  Commonly known as:  B-D ULTRAFINE III SHORT PEN   Use to inject 4x daily      levothyroxine 50 MCG tablet  Commonly known as:  SYNTHROID   50 mcg, Oral, Daily      metoprolol tartrate 100 MG tablet  Commonly known as:  LOPRESSOR   100 mg, Oral, Every 12 Hours Scheduled      RELION GLUCOSE TEST STRIPS test  strip  Generic drug:  glucose blood   1 each, Other, 2 Times Daily, Use as instructed       vitamin D 27754 units capsule capsule  Commonly known as:  ERGOCALCIFEROL   Take 1 cap 3 times weekly         Stop These Medications    ipratropium-albuterol  MCG/ACT inhaler  Commonly known as:  COMBIVENT RESPIMAT  Stopped by:  Amber Murguia MD     ondansetron ODT 4 MG disintegrating tablet  Commonly known as:  ZOFRAN-ODT  Stopped by:  Amber Murguia MD     SAXagliptin-MetFORMIN ER 2.5-1000 MG tablet sustained-release 24 hour  Stopped by:  Amber Murguia MD     valsartan 320 MG tablet  Commonly known as:  DIOVAN  Stopped by:  MD Amber Padilla MD  07/31/18  2:20 PM

## 2018-07-31 NOTE — TELEPHONE ENCOUNTER
At 2:30 pm we found Mr. Mo's container of medications in an exam room.  I called patient at 2:35pm and left patient a message that we had his medications and we would be open until 5pm today.  At 3:00pm patient's wife called back and said she received my message.  Mrs. Mo said their son had an eye appt and she was not for sure if they could come back today to  his medications or not.  I informed Mrs. Mo if they did not  the medications today we would lock them up overnight.  Mrs. Mo said they would either come today or tomorrow to .  gabbie

## 2018-08-09 ENCOUNTER — OFFICE VISIT (OUTPATIENT)
Dept: FAMILY MEDICINE CLINIC | Facility: CLINIC | Age: 63
End: 2018-08-09

## 2018-08-09 VITALS
RESPIRATION RATE: 16 BRPM | HEIGHT: 72 IN | HEART RATE: 78 BPM | TEMPERATURE: 97.1 F | SYSTOLIC BLOOD PRESSURE: 143 MMHG | BODY MASS INDEX: 24.65 KG/M2 | WEIGHT: 182 LBS | DIASTOLIC BLOOD PRESSURE: 77 MMHG

## 2018-08-09 DIAGNOSIS — F32.4 MAJOR DEPRESSIVE DISORDER WITH SINGLE EPISODE, IN PARTIAL REMISSION (HCC): ICD-10-CM

## 2018-08-09 DIAGNOSIS — N18.9 CHRONIC KIDNEY DISEASE, UNSPECIFIED CKD STAGE: ICD-10-CM

## 2018-08-09 DIAGNOSIS — E78.49 OTHER HYPERLIPIDEMIA: ICD-10-CM

## 2018-08-09 DIAGNOSIS — I10 ESSENTIAL HYPERTENSION: Primary | ICD-10-CM

## 2018-08-09 DIAGNOSIS — E55.9 VITAMIN D DEFICIENCY: ICD-10-CM

## 2018-08-09 DIAGNOSIS — K59.01 SLOW TRANSIT CONSTIPATION: ICD-10-CM

## 2018-08-09 DIAGNOSIS — F32.5 MAJOR DEPRESSIVE DISORDER WITH SINGLE EPISODE, IN FULL REMISSION (HCC): ICD-10-CM

## 2018-08-09 DIAGNOSIS — I25.9 CHRONIC ISCHEMIC HEART DISEASE: ICD-10-CM

## 2018-08-09 DIAGNOSIS — I63.10 CEREBROVASCULAR ACCIDENT (CVA) DUE TO EMBOLISM OF PRECEREBRAL ARTERY (HCC): ICD-10-CM

## 2018-08-09 PROBLEM — I16.0 HYPERTENSIVE URGENCY: Status: RESOLVED | Noted: 2018-02-16 | Resolved: 2018-08-09

## 2018-08-09 PROCEDURE — 99214 OFFICE O/P EST MOD 30 MIN: CPT | Performed by: FAMILY MEDICINE

## 2018-08-09 RX ORDER — CLOPIDOGREL BISULFATE 75 MG/1
75 TABLET ORAL DAILY
Qty: 90 TABLET | Refills: 1 | Status: SHIPPED | OUTPATIENT
Start: 2018-08-09 | End: 2019-01-18

## 2018-08-09 RX ORDER — ASPIRIN 81 MG/1
81 TABLET ORAL DAILY
Qty: 90 TABLET | Refills: 1 | Status: SHIPPED | OUTPATIENT
Start: 2018-08-09 | End: 2019-04-23

## 2018-08-09 RX ORDER — BUPROPION HYDROCHLORIDE 150 MG/1
150 TABLET ORAL DAILY
Qty: 90 TABLET | Refills: 1 | Status: SHIPPED | OUTPATIENT
Start: 2018-08-09 | End: 2019-02-06

## 2018-08-09 RX ORDER — ATORVASTATIN CALCIUM 40 MG/1
40 TABLET, FILM COATED ORAL NIGHTLY
Qty: 90 TABLET | Refills: 1 | Status: SHIPPED | OUTPATIENT
Start: 2018-08-09 | End: 2019-04-23

## 2018-08-09 RX ORDER — LOSARTAN POTASSIUM 100 MG/1
100 TABLET ORAL DAILY
Qty: 90 TABLET | Refills: 1 | Status: SHIPPED | OUTPATIENT
Start: 2018-08-09 | End: 2018-09-24 | Stop reason: HOSPADM

## 2018-08-09 RX ORDER — AMLODIPINE BESYLATE 5 MG/1
5 TABLET ORAL DAILY
Qty: 90 TABLET | Refills: 1 | Status: SHIPPED | OUTPATIENT
Start: 2018-08-09 | End: 2019-04-23

## 2018-08-09 RX ORDER — METOPROLOL TARTRATE 100 MG/1
100 TABLET ORAL 2 TIMES DAILY
Qty: 180 TABLET | Refills: 1 | Status: SHIPPED | OUTPATIENT
Start: 2018-08-09 | End: 2018-12-04 | Stop reason: HOSPADM

## 2018-08-09 NOTE — PROGRESS NOTES
"Chief Complaint   Patient presents with   • Cerebrovascular Accident   • Hypertension       Subjective   This patient presents the office to follow-up on recent events.  He ran out of insurance and now has resumed having insurance.  Since our last visit, he has had a stroke.  The MRI has been reviewed.  Results are seen below.  His blood pressure is slightly above goal.  His amlodipine has not been taken recently and we will add that back to his regimen.  He continues to take dual antiplatelet therapy at the rate quest of his neurologist.  Please refer to the discharge summary note on April 2018.  He continues to have problems with slow transit constipation and is overdue for colonoscopy.  He has history of chronic kidney disease and will determine type of that problem based on upcoming labs which are due to be drawn tomorrow.  BPH symptoms are stable.  He remains under the care of cardiology for history of chronic ischemic heart disease.  He also sees endocrinology for diabetes and vitamin D deficiency and thyroid disease.  Medication list has been updated during today's visit.  I have reviewed and updated his medications, medical history and problem list during today's office visit.     Patient Care Team:  García Rehman MD as PCP - General (Family Medicine)  García Rehman MD as PCP - Family Medicine  Amber Murguia MD as Consulting Physician (Cardiology)    Social History   Substance Use Topics   • Smoking status: Never Smoker   • Smokeless tobacco: Never Used      Comment: daily caffiene   • Alcohol use No       Review of Systems   Constitutional: Negative for fatigue.   HENT: Positive for hearing loss.    Cardiovascular: Negative for chest pain.   Gastrointestinal: Positive for constipation.       Objective     /77   Pulse 78   Temp 97.1 °F (36.2 °C) (Oral)   Resp 16   Ht 182.9 cm (72\")   Wt 82.6 kg (182 lb)   BMI 24.68 kg/m²     Body mass index is 24.68 kg/m².    Physical Exam   Constitutional: " He is oriented to person, place, and time. He appears well-developed. No distress.   Eyes: Conjunctivae and lids are normal.   Neck: Carotid bruit is not present.   Cardiovascular: Normal rate, regular rhythm and normal heart sounds.    Pulmonary/Chest: Effort normal. He has decreased breath sounds in the right lower field. He has rales in the right lower field.   Neurological: He is alert and oriented to person, place, and time.   Skin: Skin is warm and dry.   Psychiatric: He has a normal mood and affect. His behavior is normal.   Vitals reviewed.       Data Reviewed:  MRI Brain Without Contrast [[id number]] Collected:  05/01/18 1914        Updated:  05/01/18 1930     Narrative:        MRI BRAIN WITHOUT CONTRAST     HISTORY: 62-year-old male with agitation. Patient is combative and  confused.     TECHNIQUE: Brain MRI without contrast includes sagittal T1 as well as  axial diffusion, FLAIR, T1, T2, gradient echo sequences.     COMPARISON: Head CT without contrast 04/30/2018, MRI brain without  contrast 02/17/2018, 12/02/2017.     FINDINGS: Within the central superior left frontal lobe white matter  there is an 8 mm focus of restricted diffusion that is new when compared  to the previous MRI of 02/17/2018. Within the left superior, medial  occipital lobe there is a 5 mm focus restricted diffusion consistent  with an acute infarction. Within the right inferolateral parietal lobe  there is a 5 mm focus restricted diffusion consistent with an acute  infarction. There has been continued evolution of the infarction  demonstrated to be acute on previous brain MRI 02/17/2018. Within the  medial right cerebellar hemisphere there is a very faint focus  restricted diffusion measuring 6 mm consistent with a subacute  infarction. This is anterior to a chronic area of encephalomalacia that  measures 10 mm consistent with a chronic infarction. Additionally,  within the left parietal lobe there is a small cortical area of  faint  restricted diffusion consistent with subacute infarction. There is  extensive confluent periventricular and patchy deep cerebral white  matter FLAIR hyperintense signal that is consistent with chronic small  vessel ischemic white matter change. There is no evidence for mass  effect or shift of midline structures. Ventricles are not changed in  size or configuration. No extra-axial fluid collection is evident.     Two small left parietal and single right frontal scalp and right  posterior suboccipital nodules are likely sebaceous cyst and are without  change. Mastoid air cells appear clear. A right maxillary sinus mucus  retention cyst measures 17 x 13 mm.        Impression:        1. Subcentimeter acute bilateral cerebral hemisphere infarctions. There  are additional cerebral and cerebellar infarctions of various ages with  some appearing subacute and some chronic and there has been evolution of  the small infarctions that were demonstrated to be acute on previous  brain MRI 02/17/2018. Infarcts are in various vascular territories  supporting the presence of embolic phenomena.  2. Chronic small vessel ischemic white matter change similar to the  previous exam.                Assessment/Plan     Problem List Items Addressed This Visit     Major depressive disorder with single episode, in partial remission (CMS/HCC)    Relevant Medications    buPROPion XL (WELLBUTRIN XL) 150 MG 24 hr tablet    Slow transit constipation    Relevant Orders    Ambulatory Referral to Gastroenterology    Chronic kidney disease    Essential hypertension - Primary    Relevant Medications    metoprolol tartrate (LOPRESSOR) 100 MG tablet    amLODIPine (NORVASC) 5 MG tablet    losartan (COZAAR) 100 MG tablet    Cerebrovascular accident (CVA) due to embolism of precerebral artery (CMS/HCC)    Relevant Medications    clopidogrel (PLAVIX) 75 MG tablet    aspirin 81 MG EC tablet    Other hyperlipidemia    Relevant Medications    atorvastatin  (LIPITOR) 40 MG tablet    Chronic ischemic heart disease    Relevant Medications    metoprolol tartrate (LOPRESSOR) 100 MG tablet    amLODIPine (NORVASC) 5 MG tablet    clopidogrel (PLAVIX) 75 MG tablet    Vitamin D deficiency      Other Visit Diagnoses     Major depressive disorder with single episode, in full remission (CMS/[facility])        Relevant Medications    buPROPion XL (WELLBUTRIN XL) 150 MG 24 hr tablet          Orders Placed This Encounter   Procedures   • Ambulatory Referral to Gastroenterology     Referral Priority:   Routine     Referral Type:   Consultation     Referral Reason:   Specialty Services Required     Referred to Provider:   Grupo Miranda MD     Requested Specialty:   Gastroenterology     Number of Visits Requested:   1         Current Outpatient Prescriptions:   •  aspirin 81 MG EC tablet, Take 1 tablet by mouth Daily for 180 days., Disp: 90 tablet, Rfl: 1  •  atorvastatin (LIPITOR) 40 MG tablet, Take 1 tablet by mouth Every Night for 180 days., Disp: 90 tablet, Rfl: 1  •  buPROPion XL (WELLBUTRIN XL) 150 MG 24 hr tablet, Take 1 tablet by mouth Daily for 180 days., Disp: 90 tablet, Rfl: 1  •  clopidogrel (PLAVIX) 75 MG tablet, Take 1 tablet by mouth Daily for 180 days., Disp: 90 tablet, Rfl: 1  •  docusate sodium (COLACE) 100 MG capsule, Take 1 capsule by mouth 2 (two) times a day. (Patient taking differently: Take 100 mg by mouth Daily.), Disp: 30 capsule, Rfl: 0  •  insulin glulisine (APIDRA) 100 UNIT/ML injection, Inject  under the skin into the appropriate area as directed 3 (Three) Times a Day Before Meals. Sliding scale, Disp: , Rfl:   •  Insulin Pen Needle (B-D ULTRAFINE III SHORT PEN) 31G X 8 MM misc, Use to inject 4x daily, Disp: 150 each, Rfl: 5  •  levothyroxine (SYNTHROID) 50 MCG tablet, Take 1 tablet by mouth Daily., Disp: 30 tablet, Rfl: 5  •  losartan (COZAAR) 100 MG tablet, Take 1 tablet by mouth Daily for 180 days., Disp: 90 tablet, Rfl: 1  •  RELION GLUCOSE TEST STRIPS test  strip, 1 each by Other route 2 (two) times a day. Use as instructed, Disp: , Rfl:   •  vitamin D (ERGOCALCIFEROL) 56073 units capsule capsule, Take 1 cap 3 times weekly, Disp: 24 capsule, Rfl: 1  •  amLODIPine (NORVASC) 5 MG tablet, Take 1 tablet by mouth Daily for 180 days., Disp: 90 tablet, Rfl: 1  •  metoprolol tartrate (LOPRESSOR) 100 MG tablet, Take 1 tablet by mouth 2 (Two) Times a Day for 180 days., Disp: 180 tablet, Rfl: 1    Return in about 1 week (around 8/16/2018) for Recheck hearing and lungs.

## 2018-08-22 ENCOUNTER — OFFICE VISIT (OUTPATIENT)
Dept: GASTROENTEROLOGY | Facility: CLINIC | Age: 63
End: 2018-08-22

## 2018-08-22 ENCOUNTER — TELEPHONE (OUTPATIENT)
Dept: ENDOCRINOLOGY | Age: 63
End: 2018-08-22

## 2018-08-22 VITALS
TEMPERATURE: 97.6 F | DIASTOLIC BLOOD PRESSURE: 82 MMHG | SYSTOLIC BLOOD PRESSURE: 144 MMHG | HEIGHT: 73 IN | BODY MASS INDEX: 24.25 KG/M2 | WEIGHT: 183 LBS

## 2018-08-22 DIAGNOSIS — Z12.11 SCREENING FOR COLORECTAL CANCER: ICD-10-CM

## 2018-08-22 DIAGNOSIS — Z12.12 SCREENING FOR COLORECTAL CANCER: ICD-10-CM

## 2018-08-22 DIAGNOSIS — K59.00 CONSTIPATION, UNSPECIFIED CONSTIPATION TYPE: Primary | ICD-10-CM

## 2018-08-22 PROCEDURE — 99204 OFFICE O/P NEW MOD 45 MIN: CPT | Performed by: INTERNAL MEDICINE

## 2018-08-22 NOTE — PROGRESS NOTES
Chief Complaint   Patient presents with   • Constipation   • Suzanne Mo is a  63 y.o. male here for an initial visit for constipation and screening for colorectal cancer    HPI this 63-year-old white male patient of Dr. García Rehman presents with complaints of constipation for the past 6 months.  His bowel pattern is currently 2-3 days between movements whereas normal bowel function was daily or every other day.  He admits to some rectal pain with straining.  He has tried Colace but with without significant relief.  He believes this may be associated with medication although cannot recall specific precipitating factors.  He also has had cerebrovascular accidents dating back to December of last year as well as May of this year.  He denies any rectal bleeding.  He has undergone previous screening examination of his colon more than 10 years past.  Per his account that evaluation was normal.  He does have a family history of diverticular disease but negative family history for colorectal cancer.  He admits to decrease in physical activity subsequent to his CVAs.  We discussed a trial of water-soluble fiber and I offered colonoscopic evaluation to assess his anatomy and for screening evaluation.    Past Medical History:   Diagnosis Date   • Acute sinusitis    • SYLVAIN (acute kidney injury) (CMS/HCC)     on CKD   • Anemia    • Arthritis    • Cancer (CMS/HCC)     skin   • Chronic ischemic heart disease    • CKD (chronic kidney disease)    • Depression    • Diabetes mellitus type 2, uncontrolled, without complications (CMS/HCC)    • Heart attack    • History of coronary artery disease     with remote history of bypass surgery in 2001   • Hyperglycemia    • Hyperlipidemia    • Hypertension    • Hypertensive encephalopathy    • Mental status change     Acute   • RBBB (right bundle branch block)    • Screening for prostate cancer 2011   • Stroke (CMS/HCC)    • TIA (transient ischemic attack)    • Type 2 diabetes  mellitus (CMS/[facility])    • Vitamin D deficiency        Current Outpatient Prescriptions   Medication Sig Dispense Refill   • amLODIPine (NORVASC) 5 MG tablet Take 1 tablet by mouth Daily for 180 days. 90 tablet 1   • aspirin 81 MG EC tablet Take 1 tablet by mouth Daily for 180 days. 90 tablet 1   • atorvastatin (LIPITOR) 40 MG tablet Take 1 tablet by mouth Every Night for 180 days. 90 tablet 1   • buPROPion XL (WELLBUTRIN XL) 150 MG 24 hr tablet Take 1 tablet by mouth Daily for 180 days. 90 tablet 1   • clopidogrel (PLAVIX) 75 MG tablet Take 1 tablet by mouth Daily for 180 days. 90 tablet 1   • docusate sodium (COLACE) 100 MG capsule Take 1 capsule by mouth 2 (two) times a day. (Patient taking differently: Take 100 mg by mouth Daily.) 30 capsule 0   • insulin glulisine (APIDRA) 100 UNIT/ML injection Inject  under the skin into the appropriate area as directed 3 (Three) Times a Day Before Meals. Sliding scale     • Insulin Pen Needle (B-D ULTRAFINE III SHORT PEN) 31G X 8 MM misc Use to inject 4x daily 150 each 5   • levothyroxine (SYNTHROID) 50 MCG tablet Take 1 tablet by mouth Daily. 30 tablet 5   • losartan (COZAAR) 100 MG tablet Take 1 tablet by mouth Daily for 180 days. 90 tablet 1   • metoprolol tartrate (LOPRESSOR) 100 MG tablet Take 1 tablet by mouth 2 (Two) Times a Day for 180 days. 180 tablet 1   • RELION GLUCOSE TEST STRIPS test strip 1 each by Other route 2 (two) times a day. Use as instructed     • vitamin D (ERGOCALCIFEROL) 55567 units capsule capsule Take 1 cap 3 times weekly 24 capsule 1     No current facility-administered medications for this visit.        PRN Meds:.    Allergies   Allergen Reactions   • Fluoxetine Other (See Comments)   • Prozac [Fluoxetine Hcl] Other (See Comments)     shaking       Social History     Social History   • Marital status:      Spouse name: N/A   • Number of children: N/A   • Years of education: N/A     Occupational History   • Not on file.     Social History Main  Topics   • Smoking status: Never Smoker   • Smokeless tobacco: Never Used      Comment: daily caffiene   • Alcohol use No   • Drug use: No   • Sexual activity: Defer     Other Topics Concern   • Not on file     Social History Narrative   • No narrative on file       Family History   Problem Relation Age of Onset   • Diabetes Mother    • Hypertension Mother    • Macular degeneration Mother    • Alcohol abuse Father    • Cancer Father         lung   • Alcohol abuse Brother        Review of Systems   Constitutional: Negative for activity change, appetite change, fatigue and unexpected weight change.   HENT: Negative for congestion, facial swelling, sore throat, trouble swallowing and voice change.    Eyes: Negative for photophobia and visual disturbance.   Respiratory: Negative for cough and choking.    Cardiovascular: Negative for chest pain.   Gastrointestinal: Positive for abdominal distention and constipation. Negative for abdominal pain, anal bleeding, blood in stool, diarrhea, nausea, rectal pain and vomiting.   Endocrine: Negative for polyphagia.   Musculoskeletal: Negative for arthralgias, gait problem and joint swelling.   Skin: Negative for color change, pallor and rash.   Allergic/Immunologic: Negative for food allergies.   Neurological: Negative for speech difficulty and headaches.   Hematological: Does not bruise/bleed easily.   Psychiatric/Behavioral: Negative for agitation, confusion and sleep disturbance.       Vitals:    08/22/18 0850   BP: 144/82   Temp: 97.6 °F (36.4 °C)       Physical Exam   Constitutional: He is oriented to person, place, and time. He appears well-developed and well-nourished. No distress.   HENT:   Head: Normocephalic.   Mouth/Throat: Oropharynx is clear and moist. No oropharyngeal exudate.   Eyes: Conjunctivae and EOM are normal. No scleral icterus.   Neck: Normal range of motion. No thyromegaly present.   Cardiovascular: Normal rate and regular rhythm.    No murmur  heard.  Pulmonary/Chest: Breath sounds normal. No respiratory distress. He has no wheezes. He has no rales.   Abdominal: Soft. Bowel sounds are normal. He exhibits no distension and no mass. There is no hepatosplenomegaly. There is no tenderness.   Musculoskeletal: Normal range of motion. He exhibits no edema or tenderness.   Lymphadenopathy:     He has no cervical adenopathy.   Neurological: He is alert and oriented to person, place, and time.   Skin: Skin is warm and dry. No rash noted. He is not diaphoretic. No erythema.   Psychiatric: He has a normal mood and affect. His behavior is normal.   Somewhat slow to respond   Vitals reviewed.      ASSESSMENT   #1 constipation: May be associated with recent neurologic issues, may also be medication related.  #2 screening for colorectal cancer      PLAN  Schedule colonoscopy  Initiate water-soluble fiber      ICD-10-CM ICD-9-CM   1. Constipation, unspecified constipation type K59.00 564.00

## 2018-08-22 NOTE — TELEPHONE ENCOUNTER
----- Message from Matthew Subramanian sent at 8/22/2018 11:20 AM EDT -----  Contact: DIAZ PERALES [phone] CALLED TO HAVE A PRESCRIPTION REPLACEMENT FOR insulin glulisine (APIDRA) 100 UNIT/ML injection TO BE CHANGED TO [facility] CALLED INTO [id number] - [place], [place] - [address] - [phone]  - [phone] (Phone)  [phone] (Fax)          Spoke to the pharmacy and informed them that the patient would need to schedule an appointment for any refills. Pharmacy stated that they would relay the message to the patient.

## 2018-08-29 ENCOUNTER — HOSPITAL ENCOUNTER (EMERGENCY)
Facility: HOSPITAL | Age: 63
Discharge: HOME | End: 2018-08-29
Attending: EMERGENCY MEDICINE | Admitting: EMERGENCY MEDICINE

## 2018-08-29 ENCOUNTER — APPOINTMENT (OUTPATIENT)
Dept: CT IMAGING | Facility: HOSPITAL | Age: 63
End: 2018-08-29

## 2018-08-29 VITALS
WEIGHT: 185 LBS | SYSTOLIC BLOOD PRESSURE: 179 MMHG | HEIGHT: 72 IN | OXYGEN SATURATION: 97 % | DIASTOLIC BLOOD PRESSURE: 98 MMHG | BODY MASS INDEX: 25.06 KG/M2 | TEMPERATURE: 97.4 F | HEART RATE: 69 BPM | RESPIRATION RATE: 18 BRPM

## 2018-08-29 DIAGNOSIS — N28.9 RENAL INSUFFICIENCY: ICD-10-CM

## 2018-08-29 DIAGNOSIS — R42 LIGHTHEADEDNESS: Primary | ICD-10-CM

## 2018-08-29 LAB
ALBUMIN SERPL-MCNC: 3.9 G/DL (ref 3.5–5.2)
ALBUMIN/GLOB SERPL: 1.6 G/DL
ALP SERPL-CCNC: 74 U/L (ref 39–117)
ALT SERPL W P-5'-P-CCNC: 17 U/L (ref 1–41)
ANION GAP SERPL CALCULATED.3IONS-SCNC: 10.7 MMOL/L
AST SERPL-CCNC: 13 U/L (ref 1–40)
BASOPHILS # BLD AUTO: 0.01 10*3/MM3 (ref 0–0.2)
BASOPHILS NFR BLD AUTO: 0.1 % (ref 0–1.5)
BILIRUB SERPL-MCNC: 0.4 MG/DL (ref 0.1–1.2)
BUN BLD-MCNC: 26 MG/DL (ref 8–23)
BUN/CREAT SERPL: 13.6 (ref 7–25)
CALCIUM SPEC-SCNC: 9.4 MG/DL (ref 8.6–10.5)
CHLORIDE SERPL-SCNC: 104 MMOL/L (ref 98–107)
CO2 SERPL-SCNC: 26.3 MMOL/L (ref 22–29)
CREAT BLD-MCNC: 1.91 MG/DL (ref 0.76–1.27)
DEPRECATED RDW RBC AUTO: 40.7 FL (ref 37–54)
EOSINOPHIL # BLD AUTO: 0.12 10*3/MM3 (ref 0–0.7)
EOSINOPHIL NFR BLD AUTO: 1.5 % (ref 0.3–6.2)
ERYTHROCYTE [DISTWIDTH] IN BLOOD BY AUTOMATED COUNT: 13.9 % (ref 11.5–14.5)
GFR SERPL CREATININE-BSD FRML MDRD: 36 ML/MIN/1.73
GLOBULIN UR ELPH-MCNC: 2.4 GM/DL
GLUCOSE BLD-MCNC: 259 MG/DL (ref 65–99)
HCT VFR BLD AUTO: 39.6 % (ref 40.4–52.2)
HGB BLD-MCNC: 12.5 G/DL (ref 13.7–17.6)
HOLD SPECIMEN: NORMAL
HOLD SPECIMEN: NORMAL
IMM GRANULOCYTES # BLD: 0.03 10*3/MM3 (ref 0–0.03)
IMM GRANULOCYTES NFR BLD: 0.4 % (ref 0–0.5)
LYMPHOCYTES # BLD AUTO: 1.31 10*3/MM3 (ref 0.9–4.8)
LYMPHOCYTES NFR BLD AUTO: 15.9 % (ref 19.6–45.3)
MCH RBC QN AUTO: 25.6 PG (ref 27–32.7)
MCHC RBC AUTO-ENTMCNC: 31.6 G/DL (ref 32.6–36.4)
MCV RBC AUTO: 81.1 FL (ref 79.8–96.2)
MONOCYTES # BLD AUTO: 0.44 10*3/MM3 (ref 0.2–1.2)
MONOCYTES NFR BLD AUTO: 5.4 % (ref 5–12)
NEUTROPHILS # BLD AUTO: 6.31 10*3/MM3 (ref 1.9–8.1)
NEUTROPHILS NFR BLD AUTO: 76.7 % (ref 42.7–76)
PLATELET # BLD AUTO: 317 10*3/MM3 (ref 140–500)
PMV BLD AUTO: 10.7 FL (ref 6–12)
POTASSIUM BLD-SCNC: 4.1 MMOL/L (ref 3.5–5.2)
PROT SERPL-MCNC: 6.3 G/DL (ref 6–8.5)
RBC # BLD AUTO: 4.88 10*6/MM3 (ref 4.6–6)
SODIUM BLD-SCNC: 141 MMOL/L (ref 136–145)
TROPONIN T SERPL-MCNC: 0.03 NG/ML (ref 0–0.03)
TROPONIN T SERPL-MCNC: 0.05 NG/ML (ref 0–0.03)
WBC NRBC COR # BLD: 8.22 10*3/MM3 (ref 4.5–10.7)
WHOLE BLOOD HOLD SPECIMEN: NORMAL
WHOLE BLOOD HOLD SPECIMEN: NORMAL

## 2018-08-29 PROCEDURE — 80053 COMPREHEN METABOLIC PANEL: CPT | Performed by: PHYSICIAN ASSISTANT

## 2018-08-29 PROCEDURE — 84484 ASSAY OF TROPONIN QUANT: CPT | Performed by: PHYSICIAN ASSISTANT

## 2018-08-29 PROCEDURE — 85025 COMPLETE CBC W/AUTO DIFF WBC: CPT | Performed by: PHYSICIAN ASSISTANT

## 2018-08-29 PROCEDURE — 93005 ELECTROCARDIOGRAM TRACING: CPT | Performed by: PHYSICIAN ASSISTANT

## 2018-08-29 PROCEDURE — 84484 ASSAY OF TROPONIN QUANT: CPT | Performed by: EMERGENCY MEDICINE

## 2018-08-29 PROCEDURE — 70450 CT HEAD/BRAIN W/O DYE: CPT

## 2018-08-29 PROCEDURE — 93010 ELECTROCARDIOGRAM REPORT: CPT | Performed by: INTERNAL MEDICINE

## 2018-08-29 PROCEDURE — 96374 THER/PROPH/DIAG INJ IV PUSH: CPT

## 2018-08-29 PROCEDURE — 25010000002 PROMETHAZINE PER 50 MG: Performed by: EMERGENCY MEDICINE

## 2018-08-29 PROCEDURE — 99283 EMERGENCY DEPT VISIT LOW MDM: CPT

## 2018-08-29 PROCEDURE — 96361 HYDRATE IV INFUSION ADD-ON: CPT

## 2018-08-29 RX ORDER — SODIUM CHLORIDE 0.9 % (FLUSH) 0.9 %
10 SYRINGE (ML) INJECTION AS NEEDED
Status: DISCONTINUED | OUTPATIENT
Start: 2018-08-29 | End: 2018-08-29 | Stop reason: HOSPADM

## 2018-08-29 RX ORDER — PROMETHAZINE HYDROCHLORIDE 25 MG/ML
6.25 INJECTION, SOLUTION INTRAMUSCULAR; INTRAVENOUS ONCE
Status: COMPLETED | OUTPATIENT
Start: 2018-08-29 | End: 2018-08-29

## 2018-08-29 RX ADMIN — PROMETHAZINE HYDROCHLORIDE 6.25 MG: 25 INJECTION INTRAMUSCULAR; INTRAVENOUS at 19:15

## 2018-08-29 RX ADMIN — SODIUM CHLORIDE 1000 ML: 9 INJECTION, SOLUTION INTRAVENOUS at 19:14

## 2018-09-17 ENCOUNTER — PRIOR AUTHORIZATION (OUTPATIENT)
Dept: FAMILY MEDICINE CLINIC | Facility: CLINIC | Age: 63
End: 2018-09-17

## 2018-09-18 ENCOUNTER — ANESTHESIA EVENT (OUTPATIENT)
Dept: GASTROENTEROLOGY | Facility: HOSPITAL | Age: 63
End: 2018-09-18

## 2018-09-18 ENCOUNTER — HOSPITAL ENCOUNTER (OUTPATIENT)
Facility: HOSPITAL | Age: 63
Setting detail: HOSPITAL OUTPATIENT SURGERY
Discharge: HOME | End: 2018-09-18
Attending: INTERNAL MEDICINE | Admitting: INTERNAL MEDICINE

## 2018-09-18 ENCOUNTER — ANESTHESIA (OUTPATIENT)
Dept: GASTROENTEROLOGY | Facility: HOSPITAL | Age: 63
End: 2018-09-18

## 2018-09-18 VITALS
TEMPERATURE: 98.3 F | SYSTOLIC BLOOD PRESSURE: 157 MMHG | OXYGEN SATURATION: 92 % | WEIGHT: 189.7 LBS | HEART RATE: 71 BPM | RESPIRATION RATE: 16 BRPM | DIASTOLIC BLOOD PRESSURE: 86 MMHG | HEIGHT: 72 IN | BODY MASS INDEX: 25.7 KG/M2

## 2018-09-18 DIAGNOSIS — K59.00 CONSTIPATION, UNSPECIFIED CONSTIPATION TYPE: Primary | ICD-10-CM

## 2018-09-18 DIAGNOSIS — Z12.11 SCREENING FOR COLORECTAL CANCER: ICD-10-CM

## 2018-09-18 DIAGNOSIS — Z12.12 SCREENING FOR COLORECTAL CANCER: ICD-10-CM

## 2018-09-18 LAB — GLUCOSE BLDC GLUCOMTR-MCNC: 144 MG/DL (ref 70–130)

## 2018-09-18 PROCEDURE — 82962 GLUCOSE BLOOD TEST: CPT

## 2018-09-18 PROCEDURE — 25010000002 PROPOFOL 10 MG/ML EMULSION: Performed by: ANESTHESIOLOGY

## 2018-09-18 PROCEDURE — 45378 DIAGNOSTIC COLONOSCOPY: CPT | Performed by: INTERNAL MEDICINE

## 2018-09-18 PROCEDURE — S0260 H&P FOR SURGERY: HCPCS | Performed by: INTERNAL MEDICINE

## 2018-09-18 PROCEDURE — 0DJD8ZZ INSPECTION OF LOWER INTESTINAL TRACT, VIA NATURAL OR ARTIFICIAL OPENING ENDOSCOPIC: ICD-10-PCS | Performed by: INTERNAL MEDICINE

## 2018-09-18 RX ORDER — SODIUM CHLORIDE 0.9 % (FLUSH) 0.9 %
3 SYRINGE (ML) INJECTION AS NEEDED
Status: DISCONTINUED | OUTPATIENT
Start: 2018-09-18 | End: 2018-09-18 | Stop reason: HOSPADM

## 2018-09-18 RX ORDER — GLYCOPYRROLATE 0.2 MG/ML
INJECTION INTRAMUSCULAR; INTRAVENOUS AS NEEDED
Status: DISCONTINUED | OUTPATIENT
Start: 2018-09-18 | End: 2018-09-18 | Stop reason: SURG

## 2018-09-18 RX ORDER — LIDOCAINE HYDROCHLORIDE 20 MG/ML
INJECTION, SOLUTION INFILTRATION; PERINEURAL AS NEEDED
Status: DISCONTINUED | OUTPATIENT
Start: 2018-09-18 | End: 2018-09-18 | Stop reason: SURG

## 2018-09-18 RX ORDER — PROPOFOL 10 MG/ML
VIAL (ML) INTRAVENOUS AS NEEDED
Status: DISCONTINUED | OUTPATIENT
Start: 2018-09-18 | End: 2018-09-18 | Stop reason: SURG

## 2018-09-18 RX ORDER — SODIUM CHLORIDE 9 MG/ML
1000 INJECTION, SOLUTION INTRAVENOUS CONTINUOUS
Status: DISCONTINUED | OUTPATIENT
Start: 2018-09-18 | End: 2018-09-18 | Stop reason: HOSPADM

## 2018-09-18 RX ORDER — PROPOFOL 10 MG/ML
VIAL (ML) INTRAVENOUS CONTINUOUS PRN
Status: DISCONTINUED | OUTPATIENT
Start: 2018-09-18 | End: 2018-09-18 | Stop reason: SURG

## 2018-09-18 RX ORDER — LIDOCAINE HYDROCHLORIDE 10 MG/ML
0.5 INJECTION, SOLUTION INFILTRATION; PERINEURAL ONCE AS NEEDED
Status: DISCONTINUED | OUTPATIENT
Start: 2018-09-18 | End: 2018-09-18 | Stop reason: HOSPADM

## 2018-09-18 RX ADMIN — LIDOCAINE HYDROCHLORIDE 60 MG: 20 INJECTION, SOLUTION INFILTRATION; PERINEURAL at 16:02

## 2018-09-18 RX ADMIN — GLYCOPYRROLATE 0.2 MG: 0.2 INJECTION INTRAMUSCULAR; INTRAVENOUS at 16:02

## 2018-09-18 RX ADMIN — PROPOFOL 100 MG: 10 INJECTION, EMULSION INTRAVENOUS at 16:02

## 2018-09-18 RX ADMIN — SODIUM CHLORIDE 1000 ML: 9 INJECTION, SOLUTION INTRAVENOUS at 14:03

## 2018-09-18 RX ADMIN — PROPOFOL 140 MCG/KG/MIN: 10 INJECTION, EMULSION INTRAVENOUS at 16:02

## 2018-09-18 NOTE — ANESTHESIA POSTPROCEDURE EVALUATION
"Patient: Carlito Mo    Procedure Summary     Date:  09/18/18 Room / Location:   [facility]    Anesthesia Start:  1555 Anesthesia Stop:  1618    Procedure:  COLONOSCOPY (N/A ) Diagnosis:       Screening for colorectal cancer      Constipation, unspecified constipation type      (Screening for colorectal cancer [Z12.11, Z12.12])      (Constipation, unspecified constipation type [K59.00])    Surgeon:  Jose Enrique Louie MD Provider:  Drake Yeager MD    Anesthesia Type:  MAC ASA Status:  3          Anesthesia Type: MAC  Last vitals  BP   174/94 (09/18/18 1343)   Temp   36.3 °C (97.3 °F) (09/18/18 1343)   Pulse   64 (09/18/18 1343)   Resp   16 (09/18/18 1343)     SpO2   95 % (09/18/18 1343)     Post Anesthesia Care and Evaluation    Patient location during evaluation: PHASE II  Patient participation: complete - patient participated  Level of consciousness: awake  Pain management: adequate  Airway patency: patent  Anesthetic complications: No anesthetic complications    Cardiovascular status: acceptable  Respiratory status: acceptable  Hydration status: acceptable    Comments: /94 (BP Location: Left arm, Patient Position: Lying)   Pulse 64   Temp 36.3 °C (97.3 °F) (Oral)   Resp 16   Ht 182.9 cm (72\")   Wt 86 kg (189 lb 11.2 oz)   SpO2 95%   BMI 25.73 kg/m²         "

## 2018-09-18 NOTE — ANESTHESIA PREPROCEDURE EVALUATION
Anesthesia Evaluation     Patient summary reviewed and Nursing notes reviewed                Airway   Mallampati: III  TM distance: <3 FB  Neck ROM: full  Possible difficult intubation  Dental - normal exam   (+) poor dentition    Pulmonary    (+) rhonchi,   Cardiovascular - normal exam    ECG reviewed    (+) hypertension, valvular problems/murmurs MR, past MI  >12 months, CABG >6 Months, dysrhythmias, PVD, hyperlipidemia,       Neuro/Psych  (+) TIA, CVA, psychiatric history Depression,     GI/Hepatic/Renal/Endo    (+)   renal disease CRI, diabetes mellitus type 2, hypothyroidism,     Musculoskeletal     Abdominal  - normal exam   Substance History      OB/GYN          Other   (+) arthritis                   Anesthesia Plan    ASA 3     MAC     intravenous induction   Anesthetic plan, all risks, benefits, and alternatives have been provided, discussed and informed consent has been obtained with: patient.

## 2018-09-19 ENCOUNTER — APPOINTMENT (OUTPATIENT)
Dept: GENERAL RADIOLOGY | Facility: HOSPITAL | Age: 63
End: 2018-09-19

## 2018-09-19 ENCOUNTER — ANESTHESIA EVENT (OUTPATIENT)
Dept: GASTROENTEROLOGY | Facility: HOSPITAL | Age: 63
End: 2018-09-19

## 2018-09-19 ENCOUNTER — ANESTHESIA (OUTPATIENT)
Dept: GASTROENTEROLOGY | Facility: HOSPITAL | Age: 63
End: 2018-09-19

## 2018-09-19 ENCOUNTER — HOSPITAL ENCOUNTER (INPATIENT)
Facility: HOSPITAL | Age: 63
LOS: 5 days | Discharge: HOME | End: 2018-09-24
Attending: INTERNAL MEDICINE | Admitting: INTERNAL MEDICINE

## 2018-09-19 DIAGNOSIS — Z12.11 SCREENING FOR COLORECTAL CANCER: ICD-10-CM

## 2018-09-19 DIAGNOSIS — K59.00 CONSTIPATION, UNSPECIFIED CONSTIPATION TYPE: ICD-10-CM

## 2018-09-19 DIAGNOSIS — Z12.12 SCREENING FOR COLORECTAL CANCER: ICD-10-CM

## 2018-09-19 DIAGNOSIS — J96.01 ACUTE RESPIRATORY FAILURE WITH HYPOXIA (HCC): Primary | ICD-10-CM

## 2018-09-19 PROBLEM — R09.02 POSTOPERATIVE HYPOXIA: Status: ACTIVE | Noted: 2018-09-19

## 2018-09-19 PROBLEM — Z98.890 POSTOPERATIVE HYPOXIA: Status: ACTIVE | Noted: 2018-09-19

## 2018-09-19 LAB
GLUCOSE BLDC GLUCOMTR-MCNC: 119 MG/DL (ref 70–130)
GLUCOSE BLDC GLUCOMTR-MCNC: 137 MG/DL (ref 70–130)
TROPONIN T SERPL-MCNC: 0.05 NG/ML (ref 0–0.03)

## 2018-09-19 PROCEDURE — 0DBN8ZZ EXCISION OF SIGMOID COLON, VIA NATURAL OR ARTIFICIAL OPENING ENDOSCOPIC: ICD-10-PCS | Performed by: INTERNAL MEDICINE

## 2018-09-19 PROCEDURE — 25010000002 FUROSEMIDE PER 20 MG: Performed by: ANESTHESIOLOGY

## 2018-09-19 PROCEDURE — 94640 AIRWAY INHALATION TREATMENT: CPT

## 2018-09-19 PROCEDURE — 25010000002 PROPOFOL 10 MG/ML EMULSION: Performed by: ANESTHESIOLOGY

## 2018-09-19 PROCEDURE — 88305 TISSUE EXAM BY PATHOLOGIST: CPT | Performed by: INTERNAL MEDICINE

## 2018-09-19 PROCEDURE — 82962 GLUCOSE BLOOD TEST: CPT

## 2018-09-19 PROCEDURE — 0DBK8ZZ EXCISION OF ASCENDING COLON, VIA NATURAL OR ARTIFICIAL OPENING ENDOSCOPIC: ICD-10-PCS | Performed by: INTERNAL MEDICINE

## 2018-09-19 PROCEDURE — 0DBL8ZZ EXCISION OF TRANSVERSE COLON, VIA NATURAL OR ARTIFICIAL OPENING ENDOSCOPIC: ICD-10-PCS | Performed by: INTERNAL MEDICINE

## 2018-09-19 PROCEDURE — 45385 COLONOSCOPY W/LESION REMOVAL: CPT | Performed by: INTERNAL MEDICINE

## 2018-09-19 PROCEDURE — P9612 CATHETERIZE FOR URINE SPEC: HCPCS

## 2018-09-19 PROCEDURE — 45381 COLONOSCOPY SUBMUCOUS NJX: CPT | Performed by: INTERNAL MEDICINE

## 2018-09-19 PROCEDURE — 25010000002 ONDANSETRON PER 1 MG: Performed by: ANESTHESIOLOGY

## 2018-09-19 PROCEDURE — S0260 H&P FOR SURGERY: HCPCS | Performed by: INTERNAL MEDICINE

## 2018-09-19 PROCEDURE — 71045 X-RAY EXAM CHEST 1 VIEW: CPT

## 2018-09-19 PROCEDURE — 84484 ASSAY OF TROPONIN QUANT: CPT | Performed by: INTERNAL MEDICINE

## 2018-09-19 PROCEDURE — G0378 HOSPITAL OBSERVATION PER HR: HCPCS

## 2018-09-19 PROCEDURE — 94799 UNLISTED PULMONARY SVC/PX: CPT

## 2018-09-19 RX ORDER — PROMETHAZINE HYDROCHLORIDE 12.5 MG/1
12.5 TABLET ORAL ONCE AS NEEDED
Status: DISCONTINUED | OUTPATIENT
Start: 2018-09-19 | End: 2018-09-19 | Stop reason: HOSPADM

## 2018-09-19 RX ORDER — MELATONIN
5000 DAILY
Status: DISCONTINUED | OUTPATIENT
Start: 2018-09-20 | End: 2018-09-24 | Stop reason: HOSPADM

## 2018-09-19 RX ORDER — PROMETHAZINE HYDROCHLORIDE 25 MG/ML
12.5 INJECTION, SOLUTION INTRAMUSCULAR; INTRAVENOUS ONCE AS NEEDED
Status: DISCONTINUED | OUTPATIENT
Start: 2018-09-19 | End: 2018-09-19 | Stop reason: HOSPADM

## 2018-09-19 RX ORDER — LIDOCAINE HYDROCHLORIDE 20 MG/ML
INJECTION, SOLUTION INFILTRATION; PERINEURAL AS NEEDED
Status: DISCONTINUED | OUTPATIENT
Start: 2018-09-19 | End: 2018-09-19 | Stop reason: SURG

## 2018-09-19 RX ORDER — NALOXONE HCL 0.4 MG/ML
0.2 VIAL (ML) INJECTION AS NEEDED
Status: DISCONTINUED | OUTPATIENT
Start: 2018-09-19 | End: 2018-09-19 | Stop reason: HOSPADM

## 2018-09-19 RX ORDER — FENTANYL CITRATE 50 UG/ML
50 INJECTION, SOLUTION INTRAMUSCULAR; INTRAVENOUS
Status: DISCONTINUED | OUTPATIENT
Start: 2018-09-19 | End: 2018-09-19 | Stop reason: HOSPADM

## 2018-09-19 RX ORDER — POTASSIUM CHLORIDE 1.5 G/1.77G
40 POWDER, FOR SOLUTION ORAL AS NEEDED
Status: DISCONTINUED | OUTPATIENT
Start: 2018-09-19 | End: 2018-09-24 | Stop reason: HOSPADM

## 2018-09-19 RX ORDER — PROMETHAZINE HYDROCHLORIDE 25 MG/1
25 SUPPOSITORY RECTAL ONCE AS NEEDED
Status: DISCONTINUED | OUTPATIENT
Start: 2018-09-19 | End: 2018-09-19 | Stop reason: HOSPADM

## 2018-09-19 RX ORDER — LEVOTHYROXINE SODIUM 0.05 MG/1
50 TABLET ORAL DAILY
Status: DISCONTINUED | OUTPATIENT
Start: 2018-09-20 | End: 2018-09-24 | Stop reason: HOSPADM

## 2018-09-19 RX ORDER — LOSARTAN POTASSIUM 100 MG/1
100 TABLET ORAL DAILY
Status: DISCONTINUED | OUTPATIENT
Start: 2018-09-20 | End: 2018-09-20

## 2018-09-19 RX ORDER — ATORVASTATIN CALCIUM 20 MG/1
40 TABLET, FILM COATED ORAL NIGHTLY
Status: DISCONTINUED | OUTPATIENT
Start: 2018-09-19 | End: 2018-09-24 | Stop reason: HOSPADM

## 2018-09-19 RX ORDER — POTASSIUM CHLORIDE 7.45 MG/ML
10 INJECTION INTRAVENOUS
Status: DISCONTINUED | OUTPATIENT
Start: 2018-09-19 | End: 2018-09-24 | Stop reason: HOSPADM

## 2018-09-19 RX ORDER — HYDROMORPHONE HCL 110MG/55ML
0.5 PATIENT CONTROLLED ANALGESIA SYRINGE INTRAVENOUS
Status: DISCONTINUED | OUTPATIENT
Start: 2018-09-19 | End: 2018-09-19 | Stop reason: HOSPADM

## 2018-09-19 RX ORDER — EPHEDRINE SULFATE 50 MG/ML
5 INJECTION, SOLUTION INTRAVENOUS ONCE AS NEEDED
Status: DISCONTINUED | OUTPATIENT
Start: 2018-09-19 | End: 2018-09-19 | Stop reason: HOSPADM

## 2018-09-19 RX ORDER — OXYCODONE AND ACETAMINOPHEN 7.5; 325 MG/1; MG/1
1 TABLET ORAL ONCE AS NEEDED
Status: DISCONTINUED | OUTPATIENT
Start: 2018-09-19 | End: 2018-09-19 | Stop reason: HOSPADM

## 2018-09-19 RX ORDER — PROPOFOL 10 MG/ML
VIAL (ML) INTRAVENOUS AS NEEDED
Status: DISCONTINUED | OUTPATIENT
Start: 2018-09-19 | End: 2018-09-19 | Stop reason: SURG

## 2018-09-19 RX ORDER — FLUMAZENIL 0.1 MG/ML
0.2 INJECTION INTRAVENOUS AS NEEDED
Status: DISCONTINUED | OUTPATIENT
Start: 2018-09-19 | End: 2018-09-19 | Stop reason: HOSPADM

## 2018-09-19 RX ORDER — POTASSIUM CHLORIDE 750 MG/1
40 CAPSULE, EXTENDED RELEASE ORAL AS NEEDED
Status: DISCONTINUED | OUTPATIENT
Start: 2018-09-19 | End: 2018-09-24 | Stop reason: HOSPADM

## 2018-09-19 RX ORDER — PROMETHAZINE HYDROCHLORIDE 25 MG/1
25 TABLET ORAL ONCE AS NEEDED
Status: DISCONTINUED | OUTPATIENT
Start: 2018-09-19 | End: 2018-09-19 | Stop reason: HOSPADM

## 2018-09-19 RX ORDER — MAGNESIUM SULFATE HEPTAHYDRATE 40 MG/ML
2 INJECTION, SOLUTION INTRAVENOUS AS NEEDED
Status: DISCONTINUED | OUTPATIENT
Start: 2018-09-19 | End: 2018-09-24 | Stop reason: HOSPADM

## 2018-09-19 RX ORDER — METOPROLOL TARTRATE 100 MG/1
100 TABLET ORAL 2 TIMES DAILY
Status: DISCONTINUED | OUTPATIENT
Start: 2018-09-19 | End: 2018-09-24 | Stop reason: HOSPADM

## 2018-09-19 RX ORDER — ONDANSETRON 2 MG/ML
4 INJECTION INTRAMUSCULAR; INTRAVENOUS EVERY 6 HOURS PRN
Status: DISCONTINUED | OUTPATIENT
Start: 2018-09-19 | End: 2018-09-24 | Stop reason: HOSPADM

## 2018-09-19 RX ORDER — IPRATROPIUM BROMIDE AND ALBUTEROL SULFATE 2.5; .5 MG/3ML; MG/3ML
3 SOLUTION RESPIRATORY (INHALATION)
Status: DISCONTINUED | OUTPATIENT
Start: 2018-09-19 | End: 2018-09-24 | Stop reason: HOSPADM

## 2018-09-19 RX ORDER — SODIUM CHLORIDE, SODIUM LACTATE, POTASSIUM CHLORIDE, CALCIUM CHLORIDE 600; 310; 30; 20 MG/100ML; MG/100ML; MG/100ML; MG/100ML
1000 INJECTION, SOLUTION INTRAVENOUS CONTINUOUS
Status: DISCONTINUED | OUTPATIENT
Start: 2018-09-19 | End: 2018-09-19

## 2018-09-19 RX ORDER — DOCUSATE SODIUM 100 MG/1
100 CAPSULE, LIQUID FILLED ORAL DAILY PRN
Status: DISCONTINUED | OUTPATIENT
Start: 2018-09-19 | End: 2018-09-23

## 2018-09-19 RX ORDER — ONDANSETRON 2 MG/ML
4 INJECTION INTRAMUSCULAR; INTRAVENOUS ONCE AS NEEDED
Status: COMPLETED | OUTPATIENT
Start: 2018-09-19 | End: 2018-09-19

## 2018-09-19 RX ORDER — DIPHENHYDRAMINE HYDROCHLORIDE 50 MG/ML
12.5 INJECTION INTRAMUSCULAR; INTRAVENOUS
Status: DISCONTINUED | OUTPATIENT
Start: 2018-09-19 | End: 2018-09-19 | Stop reason: HOSPADM

## 2018-09-19 RX ORDER — FUROSEMIDE 10 MG/ML
INJECTION INTRAMUSCULAR; INTRAVENOUS AS NEEDED
Status: DISCONTINUED | OUTPATIENT
Start: 2018-09-19 | End: 2018-09-19 | Stop reason: SURG

## 2018-09-19 RX ORDER — LABETALOL HYDROCHLORIDE 5 MG/ML
5 INJECTION, SOLUTION INTRAVENOUS
Status: DISCONTINUED | OUTPATIENT
Start: 2018-09-19 | End: 2018-09-19 | Stop reason: HOSPADM

## 2018-09-19 RX ORDER — NICOTINE POLACRILEX 4 MG
15 LOZENGE BUCCAL
Status: DISCONTINUED | OUTPATIENT
Start: 2018-09-19 | End: 2018-09-24 | Stop reason: HOSPADM

## 2018-09-19 RX ORDER — ASPIRIN 81 MG/1
81 TABLET ORAL DAILY
Status: DISCONTINUED | OUTPATIENT
Start: 2018-09-20 | End: 2018-09-24 | Stop reason: HOSPADM

## 2018-09-19 RX ORDER — CLOPIDOGREL BISULFATE 75 MG/1
75 TABLET ORAL DAILY
Status: DISCONTINUED | OUTPATIENT
Start: 2018-09-20 | End: 2018-09-24 | Stop reason: HOSPADM

## 2018-09-19 RX ORDER — ACETAMINOPHEN 325 MG/1
650 TABLET ORAL EVERY 4 HOURS PRN
Status: DISCONTINUED | OUTPATIENT
Start: 2018-09-19 | End: 2018-09-24 | Stop reason: HOSPADM

## 2018-09-19 RX ORDER — HYDROCODONE BITARTRATE AND ACETAMINOPHEN 7.5; 325 MG/1; MG/1
1 TABLET ORAL ONCE AS NEEDED
Status: DISCONTINUED | OUTPATIENT
Start: 2018-09-19 | End: 2018-09-19 | Stop reason: HOSPADM

## 2018-09-19 RX ORDER — MEPERIDINE HYDROCHLORIDE 50 MG/ML
INJECTION INTRAMUSCULAR; INTRAVENOUS; SUBCUTANEOUS
Status: COMPLETED
Start: 2018-09-19 | End: 2018-09-19

## 2018-09-19 RX ORDER — MAGNESIUM SULFATE HEPTAHYDRATE 40 MG/ML
4 INJECTION, SOLUTION INTRAVENOUS AS NEEDED
Status: DISCONTINUED | OUTPATIENT
Start: 2018-09-19 | End: 2018-09-24 | Stop reason: HOSPADM

## 2018-09-19 RX ORDER — AMLODIPINE BESYLATE 5 MG/1
5 TABLET ORAL DAILY
Status: DISCONTINUED | OUTPATIENT
Start: 2018-09-20 | End: 2018-09-24 | Stop reason: HOSPADM

## 2018-09-19 RX ORDER — DEXTROSE MONOHYDRATE 25 G/50ML
25 INJECTION, SOLUTION INTRAVENOUS
Status: DISCONTINUED | OUTPATIENT
Start: 2018-09-19 | End: 2018-09-24 | Stop reason: HOSPADM

## 2018-09-19 RX ORDER — SODIUM CHLORIDE 0.9 % (FLUSH) 0.9 %
1-10 SYRINGE (ML) INJECTION AS NEEDED
Status: DISCONTINUED | OUTPATIENT
Start: 2018-09-19 | End: 2018-09-24 | Stop reason: HOSPADM

## 2018-09-19 RX ADMIN — FUROSEMIDE 10 MG: 10 INJECTION, SOLUTION INTRAMUSCULAR; INTRAVENOUS at 15:55

## 2018-09-19 RX ADMIN — LABETALOL HYDROCHLORIDE 10 MG: 5 INJECTION, SOLUTION INTRAVENOUS at 16:05

## 2018-09-19 RX ADMIN — ONDANSETRON 4 MG: 2 INJECTION INTRAMUSCULAR; INTRAVENOUS at 17:09

## 2018-09-19 RX ADMIN — SODIUM CHLORIDE, POTASSIUM CHLORIDE, SODIUM LACTATE AND CALCIUM CHLORIDE: 600; 310; 30; 20 INJECTION, SOLUTION INTRAVENOUS at 14:58

## 2018-09-19 RX ADMIN — LIDOCAINE HYDROCHLORIDE 40 MG: 20 INJECTION, SOLUTION INFILTRATION; PERINEURAL at 14:58

## 2018-09-19 RX ADMIN — MEPERIDINE HYDROCHLORIDE 12.5 MG: 50 INJECTION INTRAMUSCULAR; INTRAVENOUS; SUBCUTANEOUS at 18:44

## 2018-09-19 RX ADMIN — ATORVASTATIN CALCIUM 40 MG: 20 TABLET, FILM COATED ORAL at 23:00

## 2018-09-19 RX ADMIN — SODIUM CHLORIDE, POTASSIUM CHLORIDE, SODIUM LACTATE AND CALCIUM CHLORIDE 1000 ML: 600; 310; 30; 20 INJECTION, SOLUTION INTRAVENOUS at 13:19

## 2018-09-19 RX ADMIN — PROPOFOL 150 MG: 10 INJECTION, EMULSION INTRAVENOUS at 14:58

## 2018-09-19 RX ADMIN — IPRATROPIUM BROMIDE AND ALBUTEROL SULFATE 3 ML: .5; 3 SOLUTION RESPIRATORY (INHALATION) at 23:02

## 2018-09-19 RX ADMIN — FUROSEMIDE 10 MG: 10 INJECTION, SOLUTION INTRAMUSCULAR; INTRAVENOUS at 16:10

## 2018-09-19 RX ADMIN — Medication 10 ML: at 23:00

## 2018-09-19 RX ADMIN — PROPOFOL 150 MG: 10 INJECTION, EMULSION INTRAVENOUS at 15:15

## 2018-09-19 RX ADMIN — METOPROLOL TARTRATE 100 MG: 100 TABLET, FILM COATED ORAL at 23:00

## 2018-09-19 RX ADMIN — LABETALOL HYDROCHLORIDE 10 MG: 5 INJECTION, SOLUTION INTRAVENOUS at 16:30

## 2018-09-19 NOTE — ANESTHESIA POSTPROCEDURE EVALUATION
Patient: Carlito Mo    Procedure Summary     Date:  09/19/18 Room / Location:  [facility] /  [facility]    Anesthesia Start:  1458 Anesthesia Stop:      Procedure:  COLONOSCOPY into cecum and TI with hot snare polypectomies, with ascending polyp lifted with 3 ml saline and tattooed with 1 ml ink (N/A ) Diagnosis:       Colon polyps      Hemorrhoids      Tortuous colon      (Screening for colorectal cancer [Z12.11, Z12.12])      (Constipation, unspecified constipation type [K59.00])    Surgeon:  Jose Enrique Louie MD Provider:  Heladio Akins MD    Anesthesia Type:  MAC ASA Status:  3          Anesthesia Type: MAC  Last vitals  BP   170/100 (09/19/18 1625)   Temp   36.4 °C (97.5 °F) (09/19/18 1614)   Pulse   75 (09/19/18 1625)   Resp   20 (09/19/18 1625)     SpO2   95 % (09/19/18 1625)     Post Anesthesia Care and Evaluation      Comments: Pt. Discharged prior to being evaluated by anesthesia.  Chart is reviewed and no complications are noted.  THIS CASE IS NOT MEDICALLY DIRECTED

## 2018-09-19 NOTE — ANESTHESIA PREPROCEDURE EVALUATION
Anesthesia Evaluation     Patient summary reviewed   NPO Solid Status: > 8 hours  NPO Liquid Status: > 2 hours           Airway   Dental      Pulmonary    (-) COPD, asthma, not a smoker  Cardiovascular     ECG reviewed    (+) hypertension, past MI , CAD, CABG >6 Months, dysrhythmias, PVD, hyperlipidemia,   (-) angina      Neuro/Psych  (+) TIA, CVA, psychiatric history,     GI/Hepatic/Renal/Endo    (+)   diabetes mellitus type 2, hypothyroidism,     Musculoskeletal     Abdominal    Substance History      OB/GYN          Other   (+) arthritis   history of cancer                  Anesthesia Plan    ASA 3     MAC   total IV anesthesia  intravenous induction   Anesthetic plan, all risks, benefits, and alternatives have been provided, discussed and informed consent has been obtained with: patient.

## 2018-09-20 ENCOUNTER — APPOINTMENT (OUTPATIENT)
Dept: CT IMAGING | Facility: HOSPITAL | Age: 63
End: 2018-09-20

## 2018-09-20 ENCOUNTER — APPOINTMENT (OUTPATIENT)
Dept: CARDIOLOGY | Facility: HOSPITAL | Age: 63
End: 2018-09-20
Attending: INTERNAL MEDICINE

## 2018-09-20 PROBLEM — N17.9 AKI (ACUTE KIDNEY INJURY) (HCC): Status: ACTIVE | Noted: 2018-09-20

## 2018-09-20 PROBLEM — Z12.11 SCREENING FOR COLORECTAL CANCER: Status: RESOLVED | Noted: 2018-08-22 | Resolved: 2018-09-20

## 2018-09-20 PROBLEM — K59.00 CONSTIPATION: Status: RESOLVED | Noted: 2018-08-22 | Resolved: 2018-09-20

## 2018-09-20 PROBLEM — R33.9 URINARY RETENTION: Status: ACTIVE | Noted: 2018-09-20

## 2018-09-20 PROBLEM — Z12.12 SCREENING FOR COLORECTAL CANCER: Status: RESOLVED | Noted: 2018-08-22 | Resolved: 2018-09-20

## 2018-09-20 LAB
ALBUMIN SERPL-MCNC: 2.8 G/DL (ref 3.5–5.2)
ALBUMIN/GLOB SERPL: 1.1 G/DL
ALP SERPL-CCNC: 60 U/L (ref 39–117)
ALT SERPL W P-5'-P-CCNC: 12 U/L (ref 1–41)
ANION GAP SERPL CALCULATED.3IONS-SCNC: 11.2 MMOL/L
AORTIC ARCH: 3.39 CM
AORTIC DIMENSIONLESS INDEX: 0.7 (DI)
ASCENDING AORTA: 2.8 CM
AST SERPL-CCNC: 11 U/L (ref 1–40)
B PERT DNA SPEC QL NAA+PROBE: NOT DETECTED
BASOPHILS # BLD AUTO: 0.01 10*3/MM3 (ref 0–0.2)
BASOPHILS NFR BLD AUTO: 0 % (ref 0–1.5)
BH CV ECHO MEAS - ACS: 2.1 CM
BH CV ECHO MEAS - AO MAX PG (FULL): 4.9 MMHG
BH CV ECHO MEAS - AO MAX PG: 9.1 MMHG
BH CV ECHO MEAS - AO MEAN PG (FULL): 3 MMHG
BH CV ECHO MEAS - AO MEAN PG: 5 MMHG
BH CV ECHO MEAS - AO ROOT AREA (BSA CORRECTED): 1.7
BH CV ECHO MEAS - AO ROOT AREA: 9.6 CM^2
BH CV ECHO MEAS - AO ROOT DIAM: 3.5 CM
BH CV ECHO MEAS - AO V2 MAX: 151 CM/SEC
BH CV ECHO MEAS - AO V2 MEAN: 104 CM/SEC
BH CV ECHO MEAS - AO V2 VTI: 26.8 CM
BH CV ECHO MEAS - ASC AORTA: 2.8 CM
BH CV ECHO MEAS - AVA(I,A): 2.5 CM^2
BH CV ECHO MEAS - AVA(I,D): 2.5 CM^2
BH CV ECHO MEAS - AVA(V,A): 2.4 CM^2
BH CV ECHO MEAS - AVA(V,D): 2.4 CM^2
BH CV ECHO MEAS - BSA(HAYCOCK): 2.1 M^2
BH CV ECHO MEAS - BSA: 2.1 M^2
BH CV ECHO MEAS - BZI_BMI: 25.6 KILOGRAMS/M^2
BH CV ECHO MEAS - BZI_METRIC_HEIGHT: 182.9 CM
BH CV ECHO MEAS - BZI_METRIC_WEIGHT: 85.7 KG
BH CV ECHO MEAS - EDV(CUBED): 110.6 ML
BH CV ECHO MEAS - EDV(MOD-SP2): 152 ML
BH CV ECHO MEAS - EDV(MOD-SP4): 154 ML
BH CV ECHO MEAS - EDV(TEICH): 107.5 ML
BH CV ECHO MEAS - EF(CUBED): 67.5 %
BH CV ECHO MEAS - EF(MOD-BP): 46 %
BH CV ECHO MEAS - EF(MOD-SP2): 50 %
BH CV ECHO MEAS - EF(MOD-SP4): 40.9 %
BH CV ECHO MEAS - EF(TEICH): 59 %
BH CV ECHO MEAS - ESV(CUBED): 35.9 ML
BH CV ECHO MEAS - ESV(MOD-SP2): 76 ML
BH CV ECHO MEAS - ESV(MOD-SP4): 91 ML
BH CV ECHO MEAS - ESV(TEICH): 44.1 ML
BH CV ECHO MEAS - FS: 31.3 %
BH CV ECHO MEAS - IVS/LVPW: 1
BH CV ECHO MEAS - IVSD: 1.2 CM
BH CV ECHO MEAS - LAT PEAK E' VEL: 5 CM/SEC
BH CV ECHO MEAS - LV DIASTOLIC VOL/BSA (35-75): 74 ML/M^2
BH CV ECHO MEAS - LV MASS(C)D: 219.1 GRAMS
BH CV ECHO MEAS - LV MASS(C)DI: 105.4 GRAMS/M^2
BH CV ECHO MEAS - LV MAX PG: 4.2 MMHG
BH CV ECHO MEAS - LV MEAN PG: 2 MMHG
BH CV ECHO MEAS - LV SYSTOLIC VOL/BSA (12-30): 43.8 ML/M^2
BH CV ECHO MEAS - LV V1 MAX: 103 CM/SEC
BH CV ECHO MEAS - LV V1 MEAN: 71.1 CM/SEC
BH CV ECHO MEAS - LV V1 VTI: 19 CM
BH CV ECHO MEAS - LVIDD: 4.8 CM
BH CV ECHO MEAS - LVIDS: 3.3 CM
BH CV ECHO MEAS - LVLD AP2: 9.4 CM
BH CV ECHO MEAS - LVLD AP4: 9.5 CM
BH CV ECHO MEAS - LVLS AP2: 8.9 CM
BH CV ECHO MEAS - LVLS AP4: 8.6 CM
BH CV ECHO MEAS - LVOT AREA (M): 3.5 CM^2
BH CV ECHO MEAS - LVOT AREA: 3.5 CM^2
BH CV ECHO MEAS - LVOT DIAM: 2.1 CM
BH CV ECHO MEAS - LVPWD: 1.2 CM
BH CV ECHO MEAS - MED PEAK E' VEL: 4 CM/SEC
BH CV ECHO MEAS - MV A DUR: 0.11 SEC
BH CV ECHO MEAS - MV A MAX VEL: 75.2 CM/SEC
BH CV ECHO MEAS - MV DEC SLOPE: 342 CM/SEC^2
BH CV ECHO MEAS - MV DEC TIME: 0.28 SEC
BH CV ECHO MEAS - MV E MAX VEL: 88.8 CM/SEC
BH CV ECHO MEAS - MV E/A: 1.2
BH CV ECHO MEAS - MV MAX PG: 4.1 MMHG
BH CV ECHO MEAS - MV MEAN PG: 2 MMHG
BH CV ECHO MEAS - MV P1/2T MAX VEL: 101 CM/SEC
BH CV ECHO MEAS - MV P1/2T: 86.5 MSEC
BH CV ECHO MEAS - MV V2 MAX: 101 CM/SEC
BH CV ECHO MEAS - MV V2 MEAN: 66.7 CM/SEC
BH CV ECHO MEAS - MV V2 VTI: 40.3 CM
BH CV ECHO MEAS - MVA P1/2T LCG: 2.2 CM^2
BH CV ECHO MEAS - MVA(P1/2T): 2.5 CM^2
BH CV ECHO MEAS - MVA(VTI): 1.6 CM^2
BH CV ECHO MEAS - PA ACC TIME: 0.06 SEC
BH CV ECHO MEAS - PA MAX PG (FULL): 3.5 MMHG
BH CV ECHO MEAS - PA MAX PG: 5.4 MMHG
BH CV ECHO MEAS - PA PR(ACCEL): 50.7 MMHG
BH CV ECHO MEAS - PA V2 MAX: 116 CM/SEC
BH CV ECHO MEAS - PULM A REVS DUR: 0.1 SEC
BH CV ECHO MEAS - PULM A REVS VEL: 23 CM/SEC
BH CV ECHO MEAS - PULM DIAS VEL: 56.9 CM/SEC
BH CV ECHO MEAS - PULM S/D: 0.89
BH CV ECHO MEAS - PULM SYS VEL: 50.5 CM/SEC
BH CV ECHO MEAS - PVA(V,A): 2.7 CM^2
BH CV ECHO MEAS - PVA(V,D): 2.7 CM^2
BH CV ECHO MEAS - QP/QS: 0.85
BH CV ECHO MEAS - RAP SYSTOLE: 8 MMHG
BH CV ECHO MEAS - RV MAX PG: 1.8 MMHG
BH CV ECHO MEAS - RV MEAN PG: 1 MMHG
BH CV ECHO MEAS - RV V1 MAX: 68 CM/SEC
BH CV ECHO MEAS - RV V1 MEAN: 47.7 CM/SEC
BH CV ECHO MEAS - RV V1 VTI: 12.4 CM
BH CV ECHO MEAS - RVOT AREA: 4.5 CM^2
BH CV ECHO MEAS - RVOT DIAM: 2.4 CM
BH CV ECHO MEAS - RVSP: 41 MMHG
BH CV ECHO MEAS - SI(AO): 124 ML/M^2
BH CV ECHO MEAS - SI(CUBED): 35.9 ML/M^2
BH CV ECHO MEAS - SI(LVOT): 31.6 ML/M^2
BH CV ECHO MEAS - SI(MOD-SP2): 36.5 ML/M^2
BH CV ECHO MEAS - SI(MOD-SP4): 30.3 ML/M^2
BH CV ECHO MEAS - SI(TEICH): 30.5 ML/M^2
BH CV ECHO MEAS - SUP REN AO DIAM: 2.11 CM
BH CV ECHO MEAS - SV(AO): 257.8 ML
BH CV ECHO MEAS - SV(CUBED): 74.7 ML
BH CV ECHO MEAS - SV(LVOT): 65.8 ML
BH CV ECHO MEAS - SV(MOD-SP2): 76 ML
BH CV ECHO MEAS - SV(MOD-SP4): 63 ML
BH CV ECHO MEAS - SV(RVOT): 56.1 ML
BH CV ECHO MEAS - SV(TEICH): 63.4 ML
BH CV ECHO MEAS - TAPSE (>1.6): 2.39 CM2
BH CV ECHO MEAS - TR MAX VEL: 285 CM/SEC
BH CV ECHO MEASUREMENTS AVERAGE E/E' RATIO: 19.73
BH CV VAS BP RIGHT ARM: NORMAL MMHG
BH CV XLRA - RV BASE: 3.43 CM
BH CV XLRA - TDI S': 12.1 CM/SEC
BILIRUB SERPL-MCNC: 0.4 MG/DL (ref 0.1–1.2)
BUN BLD-MCNC: 24 MG/DL (ref 8–23)
BUN/CREAT SERPL: 12.2 (ref 7–25)
C PNEUM DNA NPH QL NAA+NON-PROBE: NOT DETECTED
CALCIUM SPEC-SCNC: 8.6 MG/DL (ref 8.6–10.5)
CHLORIDE SERPL-SCNC: 101 MMOL/L (ref 98–107)
CO2 SERPL-SCNC: 26.8 MMOL/L (ref 22–29)
CREAT BLD-MCNC: 1.97 MG/DL (ref 0.76–1.27)
DEPRECATED RDW RBC AUTO: 41.6 FL (ref 37–54)
EOSINOPHIL # BLD AUTO: 0.12 10*3/MM3 (ref 0–0.7)
EOSINOPHIL NFR BLD AUTO: 0.6 % (ref 0.3–6.2)
ERYTHROCYTE [DISTWIDTH] IN BLOOD BY AUTOMATED COUNT: 14.2 % (ref 11.5–14.5)
FLUAV H1 2009 PAND RNA NPH QL NAA+PROBE: NOT DETECTED
FLUAV H1 HA GENE NPH QL NAA+PROBE: NOT DETECTED
FLUAV H3 RNA NPH QL NAA+PROBE: NOT DETECTED
FLUAV SUBTYP SPEC NAA+PROBE: NOT DETECTED
FLUBV RNA ISLT QL NAA+PROBE: NOT DETECTED
GFR SERPL CREATININE-BSD FRML MDRD: 35 ML/MIN/1.73
GLOBULIN UR ELPH-MCNC: 2.5 GM/DL
GLUCOSE BLD-MCNC: 106 MG/DL (ref 65–99)
GLUCOSE BLDC GLUCOMTR-MCNC: 120 MG/DL (ref 70–130)
GLUCOSE BLDC GLUCOMTR-MCNC: 181 MG/DL (ref 70–130)
GLUCOSE BLDC GLUCOMTR-MCNC: 201 MG/DL (ref 70–130)
GLUCOSE BLDC GLUCOMTR-MCNC: 228 MG/DL (ref 70–130)
HADV DNA SPEC NAA+PROBE: NOT DETECTED
HCOV 229E RNA SPEC QL NAA+PROBE: NOT DETECTED
HCOV HKU1 RNA SPEC QL NAA+PROBE: NOT DETECTED
HCOV NL63 RNA SPEC QL NAA+PROBE: NOT DETECTED
HCOV OC43 RNA SPEC QL NAA+PROBE: NOT DETECTED
HCT VFR BLD AUTO: 36 % (ref 40.4–52.2)
HGB BLD-MCNC: 11.2 G/DL (ref 13.7–17.6)
HMPV RNA NPH QL NAA+NON-PROBE: NOT DETECTED
HPIV1 RNA SPEC QL NAA+PROBE: NOT DETECTED
HPIV2 RNA SPEC QL NAA+PROBE: NOT DETECTED
HPIV3 RNA NPH QL NAA+PROBE: NOT DETECTED
HPIV4 P GENE NPH QL NAA+PROBE: NOT DETECTED
IMM GRANULOCYTES # BLD: 0.07 10*3/MM3 (ref 0–0.03)
IMM GRANULOCYTES NFR BLD: 0.3 % (ref 0–0.5)
LEFT ATRIUM VOLUME INDEX: 38 ML/M2
LYMPHOCYTES # BLD AUTO: 1.29 10*3/MM3 (ref 0.9–4.8)
LYMPHOCYTES NFR BLD AUTO: 6.2 % (ref 19.6–45.3)
M PNEUMO IGG SER IA-ACNC: NOT DETECTED
MAXIMAL PREDICTED HEART RATE: 157 BPM
MCH RBC QN AUTO: 25.1 PG (ref 27–32.7)
MCHC RBC AUTO-ENTMCNC: 31.1 G/DL (ref 32.6–36.4)
MCV RBC AUTO: 80.5 FL (ref 79.8–96.2)
MONOCYTES # BLD AUTO: 1.05 10*3/MM3 (ref 0.2–1.2)
MONOCYTES NFR BLD AUTO: 5 % (ref 5–12)
NEUTROPHILS # BLD AUTO: 18.28 10*3/MM3 (ref 1.9–8.1)
NEUTROPHILS NFR BLD AUTO: 87.9 % (ref 42.7–76)
NT-PROBNP SERPL-MCNC: 4907 PG/ML (ref 5–900)
PLATELET # BLD AUTO: 264 10*3/MM3 (ref 140–500)
PMV BLD AUTO: 10.6 FL (ref 6–12)
POTASSIUM BLD-SCNC: 4.3 MMOL/L (ref 3.5–5.2)
PROCALCITONIN SERPL-MCNC: 16.38 NG/ML (ref 0.1–0.25)
PROT SERPL-MCNC: 5.3 G/DL (ref 6–8.5)
RBC # BLD AUTO: 4.47 10*6/MM3 (ref 4.6–6)
RHINOVIRUS RNA SPEC NAA+PROBE: NOT DETECTED
RSV RNA NPH QL NAA+NON-PROBE: NOT DETECTED
SODIUM BLD-SCNC: 139 MMOL/L (ref 136–145)
STRESS TARGET HR: 133 BPM
WBC NRBC COR # BLD: 20.82 10*3/MM3 (ref 4.5–10.7)

## 2018-09-20 PROCEDURE — G0378 HOSPITAL OBSERVATION PER HR: HCPCS

## 2018-09-20 PROCEDURE — 93306 TTE W/DOPPLER COMPLETE: CPT

## 2018-09-20 PROCEDURE — 99253 IP/OBS CNSLTJ NEW/EST LOW 45: CPT | Performed by: INTERNAL MEDICINE

## 2018-09-20 PROCEDURE — 83880 ASSAY OF NATRIURETIC PEPTIDE: CPT | Performed by: INTERNAL MEDICINE

## 2018-09-20 PROCEDURE — 87486 CHLMYD PNEUM DNA AMP PROBE: CPT | Performed by: INTERNAL MEDICINE

## 2018-09-20 PROCEDURE — 87070 CULTURE OTHR SPECIMN AEROBIC: CPT | Performed by: INTERNAL MEDICINE

## 2018-09-20 PROCEDURE — 82962 GLUCOSE BLOOD TEST: CPT

## 2018-09-20 PROCEDURE — 87040 BLOOD CULTURE FOR BACTERIA: CPT | Performed by: INTERNAL MEDICINE

## 2018-09-20 PROCEDURE — 87205 SMEAR GRAM STAIN: CPT | Performed by: INTERNAL MEDICINE

## 2018-09-20 PROCEDURE — 93005 ELECTROCARDIOGRAM TRACING: CPT | Performed by: INTERNAL MEDICINE

## 2018-09-20 PROCEDURE — 94799 UNLISTED PULMONARY SVC/PX: CPT

## 2018-09-20 PROCEDURE — 87581 M.PNEUMON DNA AMP PROBE: CPT | Performed by: INTERNAL MEDICINE

## 2018-09-20 PROCEDURE — 63710000001 INSULIN ASPART PER 5 UNITS: Performed by: INTERNAL MEDICINE

## 2018-09-20 PROCEDURE — 87633 RESP VIRUS 12-25 TARGETS: CPT | Performed by: INTERNAL MEDICINE

## 2018-09-20 PROCEDURE — 99213 OFFICE O/P EST LOW 20 MIN: CPT | Performed by: INTERNAL MEDICINE

## 2018-09-20 PROCEDURE — 85025 COMPLETE CBC W/AUTO DIFF WBC: CPT | Performed by: INTERNAL MEDICINE

## 2018-09-20 PROCEDURE — 93306 TTE W/DOPPLER COMPLETE: CPT | Performed by: INTERNAL MEDICINE

## 2018-09-20 PROCEDURE — 71250 CT THORAX DX C-: CPT

## 2018-09-20 PROCEDURE — 93010 ELECTROCARDIOGRAM REPORT: CPT | Performed by: INTERNAL MEDICINE

## 2018-09-20 PROCEDURE — 70450 CT HEAD/BRAIN W/O DYE: CPT

## 2018-09-20 PROCEDURE — 84145 PROCALCITONIN (PCT): CPT | Performed by: INTERNAL MEDICINE

## 2018-09-20 PROCEDURE — 87798 DETECT AGENT NOS DNA AMP: CPT | Performed by: INTERNAL MEDICINE

## 2018-09-20 PROCEDURE — 25010000002 PERFLUTREN (DEFINITY) 8.476 MG IN SODIUM CHLORIDE 0.9 % 10 ML INJECTION: Performed by: INTERNAL MEDICINE

## 2018-09-20 PROCEDURE — 80053 COMPREHEN METABOLIC PANEL: CPT | Performed by: INTERNAL MEDICINE

## 2018-09-20 PROCEDURE — 25010000002 PIPERACILLIN SOD-TAZOBACTAM PER 1 G: Performed by: INTERNAL MEDICINE

## 2018-09-20 RX ORDER — FUROSEMIDE 40 MG/1
40 TABLET ORAL
Status: DISCONTINUED | OUTPATIENT
Start: 2018-09-20 | End: 2018-09-20

## 2018-09-20 RX ORDER — TAMSULOSIN HYDROCHLORIDE 0.4 MG/1
0.4 CAPSULE ORAL DAILY
Status: DISCONTINUED | OUTPATIENT
Start: 2018-09-20 | End: 2018-09-21

## 2018-09-20 RX ADMIN — METOPROLOL TARTRATE 100 MG: 100 TABLET, FILM COATED ORAL at 08:36

## 2018-09-20 RX ADMIN — FUROSEMIDE 40 MG: 40 TABLET ORAL at 08:36

## 2018-09-20 RX ADMIN — IPRATROPIUM BROMIDE AND ALBUTEROL SULFATE 3 ML: .5; 3 SOLUTION RESPIRATORY (INHALATION) at 15:30

## 2018-09-20 RX ADMIN — AMLODIPINE BESYLATE 5 MG: 5 TABLET ORAL at 08:36

## 2018-09-20 RX ADMIN — LOSARTAN POTASSIUM 100 MG: 100 TABLET, FILM COATED ORAL at 11:10

## 2018-09-20 RX ADMIN — INSULIN ASPART 4 UNITS: 100 INJECTION, SOLUTION INTRAVENOUS; SUBCUTANEOUS at 21:13

## 2018-09-20 RX ADMIN — LEVOTHYROXINE SODIUM 50 MCG: 50 TABLET ORAL at 08:36

## 2018-09-20 RX ADMIN — TAZOBACTAM SODIUM AND PIPERACILLIN SODIUM 4.5 G: 500; 4 INJECTION, SOLUTION INTRAVENOUS at 19:30

## 2018-09-20 RX ADMIN — VITAMIN D, TAB 1000IU (100/BT) 5000 UNITS: 25 TAB at 08:36

## 2018-09-20 RX ADMIN — INSULIN ASPART 4 UNITS: 100 INJECTION, SOLUTION INTRAVENOUS; SUBCUTANEOUS at 08:43

## 2018-09-20 RX ADMIN — INSULIN ASPART 2 UNITS: 100 INJECTION, SOLUTION INTRAVENOUS; SUBCUTANEOUS at 12:01

## 2018-09-20 RX ADMIN — IPRATROPIUM BROMIDE AND ALBUTEROL SULFATE 3 ML: .5; 3 SOLUTION RESPIRATORY (INHALATION) at 20:34

## 2018-09-20 RX ADMIN — ASPIRIN 81 MG: 81 TABLET, FILM COATED ORAL at 08:36

## 2018-09-20 RX ADMIN — CLOPIDOGREL 75 MG: 75 TABLET, FILM COATED ORAL at 08:36

## 2018-09-20 RX ADMIN — IPRATROPIUM BROMIDE AND ALBUTEROL SULFATE 3 ML: .5; 3 SOLUTION RESPIRATORY (INHALATION) at 06:36

## 2018-09-20 RX ADMIN — TAMSULOSIN HYDROCHLORIDE 0.4 MG: 0.4 CAPSULE ORAL at 17:58

## 2018-09-20 RX ADMIN — ATORVASTATIN CALCIUM 40 MG: 20 TABLET, FILM COATED ORAL at 21:13

## 2018-09-20 RX ADMIN — PERFLUTREN 2 ML: 6.52 INJECTION, SUSPENSION INTRAVENOUS at 10:30

## 2018-09-20 RX ADMIN — FUROSEMIDE 40 MG: 40 TABLET ORAL at 17:58

## 2018-09-20 RX ADMIN — METOPROLOL TARTRATE 100 MG: 100 TABLET, FILM COATED ORAL at 21:13

## 2018-09-21 ENCOUNTER — TELEPHONE (OUTPATIENT)
Dept: GASTROENTEROLOGY | Facility: CLINIC | Age: 63
End: 2018-09-21

## 2018-09-21 PROBLEM — I50.9 ACUTE CONGESTIVE HEART FAILURE (HCC): Status: ACTIVE | Noted: 2018-09-21

## 2018-09-21 PROBLEM — J18.9 PNEUMONIA: Status: ACTIVE | Noted: 2018-09-21

## 2018-09-21 PROBLEM — J96.01 ACUTE RESPIRATORY FAILURE WITH HYPOXIA (HCC): Status: ACTIVE | Noted: 2018-09-21

## 2018-09-21 LAB
ANION GAP SERPL CALCULATED.3IONS-SCNC: 11.1 MMOL/L
BACTERIA UR QL AUTO: ABNORMAL /HPF
BASOPHILS # BLD AUTO: 0 10*3/MM3 (ref 0–0.2)
BASOPHILS NFR BLD AUTO: 0 % (ref 0–1.5)
BILIRUB UR QL STRIP: NEGATIVE
BUN BLD-MCNC: 25 MG/DL (ref 8–23)
BUN/CREAT SERPL: 11.8 (ref 7–25)
CALCIUM SPEC-SCNC: 8.4 MG/DL (ref 8.6–10.5)
CHLORIDE SERPL-SCNC: 100 MMOL/L (ref 98–107)
CLARITY UR: ABNORMAL
CO2 SERPL-SCNC: 26.9 MMOL/L (ref 22–29)
COLOR UR: YELLOW
CREAT BLD-MCNC: 2.11 MG/DL (ref 0.76–1.27)
CYTO UR: NORMAL
DEPRECATED RDW RBC AUTO: 41.8 FL (ref 37–54)
EOSINOPHIL # BLD AUTO: 0.42 10*3/MM3 (ref 0–0.7)
EOSINOPHIL NFR BLD AUTO: 2.9 % (ref 0.3–6.2)
ERYTHROCYTE [DISTWIDTH] IN BLOOD BY AUTOMATED COUNT: 14.3 % (ref 11.5–14.5)
GFR SERPL CREATININE-BSD FRML MDRD: 32 ML/MIN/1.73
GLUCOSE BLD-MCNC: 87 MG/DL (ref 65–99)
GLUCOSE BLDC GLUCOMTR-MCNC: 102 MG/DL (ref 70–130)
GLUCOSE BLDC GLUCOMTR-MCNC: 175 MG/DL (ref 70–130)
GLUCOSE BLDC GLUCOMTR-MCNC: 184 MG/DL (ref 70–130)
GLUCOSE BLDC GLUCOMTR-MCNC: 218 MG/DL (ref 70–130)
GLUCOSE UR STRIP-MCNC: ABNORMAL MG/DL
GRAN CASTS URNS QL MICRO: ABNORMAL /LPF
HCT VFR BLD AUTO: 38 % (ref 40.4–52.2)
HGB BLD-MCNC: 11.7 G/DL (ref 13.7–17.6)
HGB UR QL STRIP.AUTO: ABNORMAL
HYALINE CASTS UR QL AUTO: ABNORMAL /LPF
IMM GRANULOCYTES # BLD: 0.03 10*3/MM3 (ref 0–0.03)
IMM GRANULOCYTES NFR BLD: 0.2 % (ref 0–0.5)
KETONES UR QL STRIP: NEGATIVE
LAB AP CASE REPORT: NORMAL
LEUKOCYTE ESTERASE UR QL STRIP.AUTO: NEGATIVE
LYMPHOCYTES # BLD AUTO: 1.21 10*3/MM3 (ref 0.9–4.8)
LYMPHOCYTES NFR BLD AUTO: 8.4 % (ref 19.6–45.3)
MCH RBC QN AUTO: 24.8 PG (ref 27–32.7)
MCHC RBC AUTO-ENTMCNC: 30.8 G/DL (ref 32.6–36.4)
MCV RBC AUTO: 80.5 FL (ref 79.8–96.2)
MONOCYTES # BLD AUTO: 0.9 10*3/MM3 (ref 0.2–1.2)
MONOCYTES NFR BLD AUTO: 6.2 % (ref 5–12)
NEUTROPHILS # BLD AUTO: 11.9 10*3/MM3 (ref 1.9–8.1)
NEUTROPHILS NFR BLD AUTO: 82.3 % (ref 42.7–76)
NITRITE UR QL STRIP: NEGATIVE
PATH REPORT.FINAL DX SPEC: NORMAL
PATH REPORT.GROSS SPEC: NORMAL
PH UR STRIP.AUTO: <=5 [PH] (ref 5–8)
PLATELET # BLD AUTO: 232 10*3/MM3 (ref 140–500)
PMV BLD AUTO: 10.6 FL (ref 6–12)
POTASSIUM BLD-SCNC: 4 MMOL/L (ref 3.5–5.2)
PROCALCITONIN SERPL-MCNC: 13.89 NG/ML (ref 0.1–0.25)
PROT UR QL STRIP: ABNORMAL
RBC # BLD AUTO: 4.72 10*6/MM3 (ref 4.6–6)
RBC # UR: ABNORMAL /HPF
REF LAB TEST METHOD: ABNORMAL
SODIUM BLD-SCNC: 138 MMOL/L (ref 136–145)
SP GR UR STRIP: 1.02 (ref 1–1.03)
SQUAMOUS #/AREA URNS HPF: ABNORMAL /HPF
UROBILINOGEN UR QL STRIP: ABNORMAL
WBC NRBC COR # BLD: 14.46 10*3/MM3 (ref 4.5–10.7)
WBC UR QL AUTO: ABNORMAL /HPF

## 2018-09-21 PROCEDURE — 63710000001 INSULIN ASPART PER 5 UNITS: Performed by: INTERNAL MEDICINE

## 2018-09-21 PROCEDURE — 99213 OFFICE O/P EST LOW 20 MIN: CPT | Performed by: INTERNAL MEDICINE

## 2018-09-21 PROCEDURE — 80048 BASIC METABOLIC PNL TOTAL CA: CPT | Performed by: INTERNAL MEDICINE

## 2018-09-21 PROCEDURE — 81001 URINALYSIS AUTO W/SCOPE: CPT | Performed by: INTERNAL MEDICINE

## 2018-09-21 PROCEDURE — 94799 UNLISTED PULMONARY SVC/PX: CPT

## 2018-09-21 PROCEDURE — 84145 PROCALCITONIN (PCT): CPT | Performed by: INTERNAL MEDICINE

## 2018-09-21 PROCEDURE — 25010000002 PIPERACILLIN SOD-TAZOBACTAM PER 1 G: Performed by: INTERNAL MEDICINE

## 2018-09-21 PROCEDURE — 99233 SBSQ HOSP IP/OBS HIGH 50: CPT | Performed by: INTERNAL MEDICINE

## 2018-09-21 PROCEDURE — 85025 COMPLETE CBC W/AUTO DIFF WBC: CPT | Performed by: INTERNAL MEDICINE

## 2018-09-21 PROCEDURE — 25010000002 FUROSEMIDE PER 20 MG: Performed by: INTERNAL MEDICINE

## 2018-09-21 PROCEDURE — 82962 GLUCOSE BLOOD TEST: CPT

## 2018-09-21 RX ORDER — TAMSULOSIN HYDROCHLORIDE 0.4 MG/1
0.4 CAPSULE ORAL 2 TIMES DAILY
Status: DISCONTINUED | OUTPATIENT
Start: 2018-09-21 | End: 2018-09-24 | Stop reason: HOSPADM

## 2018-09-21 RX ORDER — FUROSEMIDE 10 MG/ML
40 INJECTION INTRAMUSCULAR; INTRAVENOUS EVERY 6 HOURS
Status: COMPLETED | OUTPATIENT
Start: 2018-09-21 | End: 2018-09-21

## 2018-09-21 RX ADMIN — TAZOBACTAM SODIUM AND PIPERACILLIN SODIUM 3.38 G: 375; 3 INJECTION, SOLUTION INTRAVENOUS at 18:14

## 2018-09-21 RX ADMIN — IPRATROPIUM BROMIDE AND ALBUTEROL SULFATE 3 ML: .5; 3 SOLUTION RESPIRATORY (INHALATION) at 21:10

## 2018-09-21 RX ADMIN — INSULIN ASPART 4 UNITS: 100 INJECTION, SOLUTION INTRAVENOUS; SUBCUTANEOUS at 21:35

## 2018-09-21 RX ADMIN — Medication 10 ML: at 12:20

## 2018-09-21 RX ADMIN — AMLODIPINE BESYLATE 5 MG: 5 TABLET ORAL at 08:52

## 2018-09-21 RX ADMIN — ASPIRIN 81 MG: 81 TABLET, FILM COATED ORAL at 08:52

## 2018-09-21 RX ADMIN — INSULIN ASPART 2 UNITS: 100 INJECTION, SOLUTION INTRAVENOUS; SUBCUTANEOUS at 12:20

## 2018-09-21 RX ADMIN — LEVOTHYROXINE SODIUM 50 MCG: 50 TABLET ORAL at 06:32

## 2018-09-21 RX ADMIN — METOPROLOL TARTRATE 100 MG: 100 TABLET, FILM COATED ORAL at 21:34

## 2018-09-21 RX ADMIN — METOPROLOL TARTRATE 100 MG: 100 TABLET, FILM COATED ORAL at 08:52

## 2018-09-21 RX ADMIN — VITAMIN D, TAB 1000IU (100/BT) 5000 UNITS: 25 TAB at 08:52

## 2018-09-21 RX ADMIN — INSULIN ASPART 2 UNITS: 100 INJECTION, SOLUTION INTRAVENOUS; SUBCUTANEOUS at 18:14

## 2018-09-21 RX ADMIN — CLOPIDOGREL 75 MG: 75 TABLET, FILM COATED ORAL at 08:52

## 2018-09-21 RX ADMIN — IPRATROPIUM BROMIDE AND ALBUTEROL SULFATE 3 ML: .5; 3 SOLUTION RESPIRATORY (INHALATION) at 07:07

## 2018-09-21 RX ADMIN — TAZOBACTAM SODIUM AND PIPERACILLIN SODIUM 3.38 G: 375; 3 INJECTION, SOLUTION INTRAVENOUS at 08:52

## 2018-09-21 RX ADMIN — ATORVASTATIN CALCIUM 40 MG: 20 TABLET, FILM COATED ORAL at 21:33

## 2018-09-21 RX ADMIN — FUROSEMIDE 40 MG: 10 INJECTION, SOLUTION INTRAMUSCULAR; INTRAVENOUS at 18:14

## 2018-09-21 RX ADMIN — TAMSULOSIN HYDROCHLORIDE 0.4 MG: 0.4 CAPSULE ORAL at 21:33

## 2018-09-21 RX ADMIN — TAZOBACTAM SODIUM AND PIPERACILLIN SODIUM 3.38 G: 375; 3 INJECTION, SOLUTION INTRAVENOUS at 00:46

## 2018-09-21 RX ADMIN — IPRATROPIUM BROMIDE AND ALBUTEROL SULFATE 3 ML: .5; 3 SOLUTION RESPIRATORY (INHALATION) at 15:04

## 2018-09-21 RX ADMIN — TAMSULOSIN HYDROCHLORIDE 0.4 MG: 0.4 CAPSULE ORAL at 08:52

## 2018-09-21 RX ADMIN — FUROSEMIDE 40 MG: 10 INJECTION, SOLUTION INTRAMUSCULAR; INTRAVENOUS at 12:20

## 2018-09-21 RX ADMIN — Medication 10 ML: at 18:14

## 2018-09-21 NOTE — TELEPHONE ENCOUNTER
Called pt and advised per Dr Louie that the colon polyps that removed were not cancerous but 3 were precancerous .  He recommends a repeat c/s in 3 yrs and f/u sooner as needed.     Pt verb understanding.     C/s placed in recall for 09/19/2021.

## 2018-09-21 NOTE — TELEPHONE ENCOUNTER
----- Message from Jose Enrique PONCE MD sent at 9/21/2018  3:17 PM EDT -----  Regarding: Biopsy results  Okay to call results, recommend follow-up colonoscopy in 3 years.  Office follow up sooner as needed.  ----- Message -----  From: Lab, Background User  Sent: 9/21/2018   3:05 PM  To: Jose Enrique PONCE MD

## 2018-09-22 LAB
ANION GAP SERPL CALCULATED.3IONS-SCNC: 10.8 MMOL/L
ANION GAP SERPL CALCULATED.3IONS-SCNC: 11.8 MMOL/L
BACTERIA SPEC RESP CULT: NORMAL
BASOPHILS # BLD AUTO: 0.01 10*3/MM3 (ref 0–0.2)
BASOPHILS NFR BLD AUTO: 0.1 % (ref 0–1.5)
BUN BLD-MCNC: 23 MG/DL (ref 8–23)
BUN BLD-MCNC: 24 MG/DL (ref 8–23)
BUN/CREAT SERPL: 11.5 (ref 7–25)
BUN/CREAT SERPL: 13.6 (ref 7–25)
CALCIUM SPEC-SCNC: 8.4 MG/DL (ref 8.6–10.5)
CALCIUM SPEC-SCNC: 8.5 MG/DL (ref 8.6–10.5)
CHLORIDE SERPL-SCNC: 97 MMOL/L (ref 98–107)
CHLORIDE SERPL-SCNC: 98 MMOL/L (ref 98–107)
CO2 SERPL-SCNC: 26.2 MMOL/L (ref 22–29)
CO2 SERPL-SCNC: 29.2 MMOL/L (ref 22–29)
CREAT BLD-MCNC: 1.77 MG/DL (ref 0.76–1.27)
CREAT BLD-MCNC: 2 MG/DL (ref 0.76–1.27)
DEPRECATED RDW RBC AUTO: 40.7 FL (ref 37–54)
EOSINOPHIL # BLD AUTO: 0.5 10*3/MM3 (ref 0–0.7)
EOSINOPHIL NFR BLD AUTO: 4.7 % (ref 0.3–6.2)
ERYTHROCYTE [DISTWIDTH] IN BLOOD BY AUTOMATED COUNT: 14 % (ref 11.5–14.5)
GFR SERPL CREATININE-BSD FRML MDRD: 34 ML/MIN/1.73
GFR SERPL CREATININE-BSD FRML MDRD: 39 ML/MIN/1.73
GLUCOSE BLD-MCNC: 155 MG/DL (ref 65–99)
GLUCOSE BLD-MCNC: 228 MG/DL (ref 65–99)
GLUCOSE BLDC GLUCOMTR-MCNC: 151 MG/DL (ref 70–130)
GLUCOSE BLDC GLUCOMTR-MCNC: 217 MG/DL (ref 70–130)
GLUCOSE BLDC GLUCOMTR-MCNC: 230 MG/DL (ref 70–130)
GLUCOSE BLDC GLUCOMTR-MCNC: 231 MG/DL (ref 70–130)
GRAM STN SPEC: NORMAL
HCT VFR BLD AUTO: 35.1 % (ref 40.4–52.2)
HGB BLD-MCNC: 10.9 G/DL (ref 13.7–17.6)
IMM GRANULOCYTES # BLD: 0.03 10*3/MM3 (ref 0–0.03)
IMM GRANULOCYTES NFR BLD: 0.3 % (ref 0–0.5)
LYMPHOCYTES # BLD AUTO: 1.2 10*3/MM3 (ref 0.9–4.8)
LYMPHOCYTES NFR BLD AUTO: 11.4 % (ref 19.6–45.3)
MAGNESIUM SERPL-MCNC: 1.8 MG/DL (ref 1.6–2.4)
MCH RBC QN AUTO: 24.9 PG (ref 27–32.7)
MCHC RBC AUTO-ENTMCNC: 31.1 G/DL (ref 32.6–36.4)
MCV RBC AUTO: 80.3 FL (ref 79.8–96.2)
MONOCYTES # BLD AUTO: 0.7 10*3/MM3 (ref 0.2–1.2)
MONOCYTES NFR BLD AUTO: 6.6 % (ref 5–12)
NEUTROPHILS # BLD AUTO: 8.13 10*3/MM3 (ref 1.9–8.1)
NEUTROPHILS NFR BLD AUTO: 76.9 % (ref 42.7–76)
NT-PROBNP SERPL-MCNC: 2863 PG/ML (ref 5–900)
PLATELET # BLD AUTO: 226 10*3/MM3 (ref 140–500)
PMV BLD AUTO: 10.6 FL (ref 6–12)
POTASSIUM BLD-SCNC: 3.8 MMOL/L (ref 3.5–5.2)
POTASSIUM BLD-SCNC: 4 MMOL/L (ref 3.5–5.2)
PROCALCITONIN SERPL-MCNC: 7.16 NG/ML (ref 0.1–0.25)
RBC # BLD AUTO: 4.37 10*6/MM3 (ref 4.6–6)
SODIUM BLD-SCNC: 136 MMOL/L (ref 136–145)
SODIUM BLD-SCNC: 137 MMOL/L (ref 136–145)
WBC NRBC COR # BLD: 10.57 10*3/MM3 (ref 4.5–10.7)

## 2018-09-22 PROCEDURE — 99232 SBSQ HOSP IP/OBS MODERATE 35: CPT | Performed by: INTERNAL MEDICINE

## 2018-09-22 PROCEDURE — 83880 ASSAY OF NATRIURETIC PEPTIDE: CPT | Performed by: INTERNAL MEDICINE

## 2018-09-22 PROCEDURE — 93010 ELECTROCARDIOGRAM REPORT: CPT | Performed by: INTERNAL MEDICINE

## 2018-09-22 PROCEDURE — 80048 BASIC METABOLIC PNL TOTAL CA: CPT | Performed by: NURSE PRACTITIONER

## 2018-09-22 PROCEDURE — 82962 GLUCOSE BLOOD TEST: CPT

## 2018-09-22 PROCEDURE — 80048 BASIC METABOLIC PNL TOTAL CA: CPT | Performed by: INTERNAL MEDICINE

## 2018-09-22 PROCEDURE — 83735 ASSAY OF MAGNESIUM: CPT | Performed by: NURSE PRACTITIONER

## 2018-09-22 PROCEDURE — 94799 UNLISTED PULMONARY SVC/PX: CPT

## 2018-09-22 PROCEDURE — 85025 COMPLETE CBC W/AUTO DIFF WBC: CPT | Performed by: INTERNAL MEDICINE

## 2018-09-22 PROCEDURE — 63710000001 INSULIN ASPART PER 5 UNITS: Performed by: INTERNAL MEDICINE

## 2018-09-22 PROCEDURE — 84145 PROCALCITONIN (PCT): CPT | Performed by: INTERNAL MEDICINE

## 2018-09-22 PROCEDURE — 93005 ELECTROCARDIOGRAM TRACING: CPT | Performed by: INTERNAL MEDICINE

## 2018-09-22 PROCEDURE — 25010000002 PIPERACILLIN SOD-TAZOBACTAM PER 1 G: Performed by: INTERNAL MEDICINE

## 2018-09-22 RX ADMIN — INSULIN ASPART 2 UNITS: 100 INJECTION, SOLUTION INTRAVENOUS; SUBCUTANEOUS at 08:11

## 2018-09-22 RX ADMIN — INSULIN ASPART 4 UNITS: 100 INJECTION, SOLUTION INTRAVENOUS; SUBCUTANEOUS at 21:37

## 2018-09-22 RX ADMIN — TAZOBACTAM SODIUM AND PIPERACILLIN SODIUM 3.38 G: 375; 3 INJECTION, SOLUTION INTRAVENOUS at 08:10

## 2018-09-22 RX ADMIN — IPRATROPIUM BROMIDE AND ALBUTEROL SULFATE 3 ML: .5; 3 SOLUTION RESPIRATORY (INHALATION) at 06:41

## 2018-09-22 RX ADMIN — INSULIN ASPART 4 UNITS: 100 INJECTION, SOLUTION INTRAVENOUS; SUBCUTANEOUS at 12:34

## 2018-09-22 RX ADMIN — INSULIN ASPART 4 UNITS: 100 INJECTION, SOLUTION INTRAVENOUS; SUBCUTANEOUS at 17:37

## 2018-09-22 RX ADMIN — ATORVASTATIN CALCIUM 40 MG: 20 TABLET, FILM COATED ORAL at 21:37

## 2018-09-22 RX ADMIN — ASPIRIN 81 MG: 81 TABLET, FILM COATED ORAL at 08:10

## 2018-09-22 RX ADMIN — IPRATROPIUM BROMIDE AND ALBUTEROL SULFATE 3 ML: .5; 3 SOLUTION RESPIRATORY (INHALATION) at 20:37

## 2018-09-22 RX ADMIN — LEVOTHYROXINE SODIUM 50 MCG: 50 TABLET ORAL at 06:14

## 2018-09-22 RX ADMIN — METOPROLOL TARTRATE 100 MG: 100 TABLET, FILM COATED ORAL at 21:37

## 2018-09-22 RX ADMIN — TAMSULOSIN HYDROCHLORIDE 0.4 MG: 0.4 CAPSULE ORAL at 08:10

## 2018-09-22 RX ADMIN — CLOPIDOGREL 75 MG: 75 TABLET, FILM COATED ORAL at 08:11

## 2018-09-22 RX ADMIN — METOPROLOL TARTRATE 100 MG: 100 TABLET, FILM COATED ORAL at 08:11

## 2018-09-22 RX ADMIN — TAMSULOSIN HYDROCHLORIDE 0.4 MG: 0.4 CAPSULE ORAL at 21:37

## 2018-09-22 RX ADMIN — TAZOBACTAM SODIUM AND PIPERACILLIN SODIUM 3.38 G: 375; 3 INJECTION, SOLUTION INTRAVENOUS at 01:03

## 2018-09-22 RX ADMIN — IPRATROPIUM BROMIDE AND ALBUTEROL SULFATE 3 ML: .5; 3 SOLUTION RESPIRATORY (INHALATION) at 14:53

## 2018-09-22 RX ADMIN — AMLODIPINE BESYLATE 5 MG: 5 TABLET ORAL at 08:11

## 2018-09-22 RX ADMIN — IPRATROPIUM BROMIDE AND ALBUTEROL SULFATE 3 ML: .5; 3 SOLUTION RESPIRATORY (INHALATION) at 10:20

## 2018-09-22 RX ADMIN — MAGNESIUM SULFATE HEPTAHYDRATE 4 G: 40 INJECTION, SOLUTION INTRAVENOUS at 22:56

## 2018-09-22 RX ADMIN — VITAMIN D, TAB 1000IU (100/BT) 5000 UNITS: 25 TAB at 08:11

## 2018-09-22 RX ADMIN — TAZOBACTAM SODIUM AND PIPERACILLIN SODIUM 3.38 G: 375; 3 INJECTION, SOLUTION INTRAVENOUS at 17:37

## 2018-09-23 LAB
ANION GAP SERPL CALCULATED.3IONS-SCNC: 11.7 MMOL/L
BASOPHILS # BLD AUTO: 0 10*3/MM3 (ref 0–0.2)
BASOPHILS NFR BLD AUTO: 0 % (ref 0–1.5)
BUN BLD-MCNC: 21 MG/DL (ref 8–23)
BUN/CREAT SERPL: 11.9 (ref 7–25)
CALCIUM SPEC-SCNC: 8.5 MG/DL (ref 8.6–10.5)
CHLORIDE SERPL-SCNC: 99 MMOL/L (ref 98–107)
CO2 SERPL-SCNC: 28.3 MMOL/L (ref 22–29)
CREAT BLD-MCNC: 1.76 MG/DL (ref 0.76–1.27)
DEPRECATED RDW RBC AUTO: 40.3 FL (ref 37–54)
EOSINOPHIL # BLD AUTO: 0.41 10*3/MM3 (ref 0–0.7)
EOSINOPHIL NFR BLD AUTO: 4.8 % (ref 0.3–6.2)
ERYTHROCYTE [DISTWIDTH] IN BLOOD BY AUTOMATED COUNT: 13.7 % (ref 11.5–14.5)
GFR SERPL CREATININE-BSD FRML MDRD: 39 ML/MIN/1.73
GLUCOSE BLD-MCNC: 132 MG/DL (ref 65–99)
GLUCOSE BLDC GLUCOMTR-MCNC: 129 MG/DL (ref 70–130)
GLUCOSE BLDC GLUCOMTR-MCNC: 199 MG/DL (ref 70–130)
GLUCOSE BLDC GLUCOMTR-MCNC: 210 MG/DL (ref 70–130)
GLUCOSE BLDC GLUCOMTR-MCNC: 218 MG/DL (ref 70–130)
HCT VFR BLD AUTO: 35 % (ref 40.4–52.2)
HGB BLD-MCNC: 10.9 G/DL (ref 13.7–17.6)
IMM GRANULOCYTES # BLD: 0.02 10*3/MM3 (ref 0–0.03)
IMM GRANULOCYTES NFR BLD: 0.2 % (ref 0–0.5)
LYMPHOCYTES # BLD AUTO: 1.46 10*3/MM3 (ref 0.9–4.8)
LYMPHOCYTES NFR BLD AUTO: 17.1 % (ref 19.6–45.3)
MAGNESIUM SERPL-MCNC: 2.5 MG/DL (ref 1.6–2.4)
MCH RBC QN AUTO: 25.1 PG (ref 27–32.7)
MCHC RBC AUTO-ENTMCNC: 31.1 G/DL (ref 32.6–36.4)
MCV RBC AUTO: 80.5 FL (ref 79.8–96.2)
MONOCYTES # BLD AUTO: 0.73 10*3/MM3 (ref 0.2–1.2)
MONOCYTES NFR BLD AUTO: 8.6 % (ref 5–12)
NEUTROPHILS # BLD AUTO: 5.91 10*3/MM3 (ref 1.9–8.1)
NEUTROPHILS NFR BLD AUTO: 69.3 % (ref 42.7–76)
PLATELET # BLD AUTO: 248 10*3/MM3 (ref 140–500)
PMV BLD AUTO: 10.9 FL (ref 6–12)
POTASSIUM BLD-SCNC: 4.2 MMOL/L (ref 3.5–5.2)
RBC # BLD AUTO: 4.35 10*6/MM3 (ref 4.6–6)
SODIUM BLD-SCNC: 139 MMOL/L (ref 136–145)
WBC NRBC COR # BLD: 8.53 10*3/MM3 (ref 4.5–10.7)

## 2018-09-23 PROCEDURE — 99232 SBSQ HOSP IP/OBS MODERATE 35: CPT | Performed by: INTERNAL MEDICINE

## 2018-09-23 PROCEDURE — 25010000002 PIPERACILLIN SOD-TAZOBACTAM PER 1 G: Performed by: INTERNAL MEDICINE

## 2018-09-23 PROCEDURE — 82962 GLUCOSE BLOOD TEST: CPT

## 2018-09-23 PROCEDURE — 85025 COMPLETE CBC W/AUTO DIFF WBC: CPT | Performed by: INTERNAL MEDICINE

## 2018-09-23 PROCEDURE — 80048 BASIC METABOLIC PNL TOTAL CA: CPT | Performed by: INTERNAL MEDICINE

## 2018-09-23 PROCEDURE — 94799 UNLISTED PULMONARY SVC/PX: CPT

## 2018-09-23 PROCEDURE — 63710000001 INSULIN ASPART PER 5 UNITS: Performed by: INTERNAL MEDICINE

## 2018-09-23 PROCEDURE — 25010000002 ONDANSETRON PER 1 MG: Performed by: INTERNAL MEDICINE

## 2018-09-23 PROCEDURE — 94762 N-INVAS EAR/PLS OXIMTRY CONT: CPT

## 2018-09-23 PROCEDURE — 83735 ASSAY OF MAGNESIUM: CPT | Performed by: INTERNAL MEDICINE

## 2018-09-23 RX ORDER — FUROSEMIDE 40 MG/1
40 TABLET ORAL
Status: DISCONTINUED | OUTPATIENT
Start: 2018-09-23 | End: 2018-09-24 | Stop reason: HOSPADM

## 2018-09-23 RX ORDER — DOCUSATE SODIUM 100 MG/1
100 CAPSULE, LIQUID FILLED ORAL 2 TIMES DAILY
Status: DISCONTINUED | OUTPATIENT
Start: 2018-09-23 | End: 2018-09-24 | Stop reason: HOSPADM

## 2018-09-23 RX ORDER — AMOXICILLIN AND CLAVULANATE POTASSIUM 875; 125 MG/1; MG/1
1 TABLET, FILM COATED ORAL EVERY 12 HOURS SCHEDULED
Status: DISCONTINUED | OUTPATIENT
Start: 2018-09-23 | End: 2018-09-24 | Stop reason: HOSPADM

## 2018-09-23 RX ADMIN — ONDANSETRON 4 MG: 2 INJECTION INTRAMUSCULAR; INTRAVENOUS at 23:15

## 2018-09-23 RX ADMIN — DOCUSATE SODIUM 100 MG: 100 CAPSULE, LIQUID FILLED ORAL at 11:24

## 2018-09-23 RX ADMIN — TAMSULOSIN HYDROCHLORIDE 0.4 MG: 0.4 CAPSULE ORAL at 21:15

## 2018-09-23 RX ADMIN — IPRATROPIUM BROMIDE AND ALBUTEROL SULFATE 3 ML: .5; 3 SOLUTION RESPIRATORY (INHALATION) at 19:50

## 2018-09-23 RX ADMIN — DOCUSATE SODIUM 100 MG: 100 CAPSULE, LIQUID FILLED ORAL at 21:27

## 2018-09-23 RX ADMIN — INSULIN ASPART 4 UNITS: 100 INJECTION, SOLUTION INTRAVENOUS; SUBCUTANEOUS at 11:30

## 2018-09-23 RX ADMIN — IPRATROPIUM BROMIDE AND ALBUTEROL SULFATE 3 ML: .5; 3 SOLUTION RESPIRATORY (INHALATION) at 10:15

## 2018-09-23 RX ADMIN — IPRATROPIUM BROMIDE AND ALBUTEROL SULFATE 3 ML: .5; 3 SOLUTION RESPIRATORY (INHALATION) at 14:14

## 2018-09-23 RX ADMIN — CLOPIDOGREL 75 MG: 75 TABLET, FILM COATED ORAL at 09:07

## 2018-09-23 RX ADMIN — TAZOBACTAM SODIUM AND PIPERACILLIN SODIUM 3.38 G: 375; 3 INJECTION, SOLUTION INTRAVENOUS at 03:20

## 2018-09-23 RX ADMIN — VITAMIN D, TAB 1000IU (100/BT) 5000 UNITS: 25 TAB at 09:08

## 2018-09-23 RX ADMIN — AMOXICILLIN AND CLAVULANATE POTASSIUM 1 TABLET: 875; 125 TABLET, FILM COATED ORAL at 11:24

## 2018-09-23 RX ADMIN — METOPROLOL TARTRATE 100 MG: 100 TABLET, FILM COATED ORAL at 09:07

## 2018-09-23 RX ADMIN — LEVOTHYROXINE SODIUM 50 MCG: 50 TABLET ORAL at 06:26

## 2018-09-23 RX ADMIN — AMLODIPINE BESYLATE 5 MG: 5 TABLET ORAL at 09:07

## 2018-09-23 RX ADMIN — AMOXICILLIN AND CLAVULANATE POTASSIUM 1 TABLET: 875; 125 TABLET, FILM COATED ORAL at 21:15

## 2018-09-23 RX ADMIN — METOPROLOL TARTRATE 100 MG: 100 TABLET, FILM COATED ORAL at 21:15

## 2018-09-23 RX ADMIN — TAMSULOSIN HYDROCHLORIDE 0.4 MG: 0.4 CAPSULE ORAL at 09:07

## 2018-09-23 RX ADMIN — ASPIRIN 81 MG: 81 TABLET, FILM COATED ORAL at 09:07

## 2018-09-23 RX ADMIN — ATORVASTATIN CALCIUM 40 MG: 20 TABLET, FILM COATED ORAL at 21:15

## 2018-09-23 RX ADMIN — FUROSEMIDE 40 MG: 40 TABLET ORAL at 17:21

## 2018-09-23 RX ADMIN — IPRATROPIUM BROMIDE AND ALBUTEROL SULFATE 3 ML: .5; 3 SOLUTION RESPIRATORY (INHALATION) at 06:30

## 2018-09-23 RX ADMIN — FUROSEMIDE 40 MG: 40 TABLET ORAL at 11:24

## 2018-09-23 RX ADMIN — INSULIN ASPART 4 UNITS: 100 INJECTION, SOLUTION INTRAVENOUS; SUBCUTANEOUS at 17:21

## 2018-09-23 RX ADMIN — INSULIN ASPART 2 UNITS: 100 INJECTION, SOLUTION INTRAVENOUS; SUBCUTANEOUS at 21:27

## 2018-09-24 VITALS
TEMPERATURE: 98.1 F | OXYGEN SATURATION: 87 % | BODY MASS INDEX: 24.64 KG/M2 | WEIGHT: 181.9 LBS | DIASTOLIC BLOOD PRESSURE: 77 MMHG | HEART RATE: 66 BPM | HEIGHT: 72 IN | SYSTOLIC BLOOD PRESSURE: 136 MMHG | RESPIRATION RATE: 20 BRPM

## 2018-09-24 LAB
ANION GAP SERPL CALCULATED.3IONS-SCNC: 9.6 MMOL/L
BUN BLD-MCNC: 20 MG/DL (ref 8–23)
BUN/CREAT SERPL: 13.2 (ref 7–25)
CALCIUM SPEC-SCNC: 8.5 MG/DL (ref 8.6–10.5)
CHLORIDE SERPL-SCNC: 99 MMOL/L (ref 98–107)
CO2 SERPL-SCNC: 28.4 MMOL/L (ref 22–29)
CREAT BLD-MCNC: 1.52 MG/DL (ref 0.76–1.27)
GFR SERPL CREATININE-BSD FRML MDRD: 47 ML/MIN/1.73
GLUCOSE BLD-MCNC: 164 MG/DL (ref 65–99)
GLUCOSE BLDC GLUCOMTR-MCNC: 159 MG/DL (ref 70–130)
GLUCOSE BLDC GLUCOMTR-MCNC: 209 MG/DL (ref 70–130)
POTASSIUM BLD-SCNC: 4.1 MMOL/L (ref 3.5–5.2)
SODIUM BLD-SCNC: 137 MMOL/L (ref 136–145)

## 2018-09-24 PROCEDURE — 94799 UNLISTED PULMONARY SVC/PX: CPT

## 2018-09-24 PROCEDURE — 82962 GLUCOSE BLOOD TEST: CPT

## 2018-09-24 PROCEDURE — 63710000001 INSULIN ASPART PER 5 UNITS: Performed by: INTERNAL MEDICINE

## 2018-09-24 PROCEDURE — 99233 SBSQ HOSP IP/OBS HIGH 50: CPT | Performed by: INTERNAL MEDICINE

## 2018-09-24 PROCEDURE — 80048 BASIC METABOLIC PNL TOTAL CA: CPT | Performed by: INTERNAL MEDICINE

## 2018-09-24 RX ORDER — AMOXICILLIN AND CLAVULANATE POTASSIUM 875; 125 MG/1; MG/1
1 TABLET, FILM COATED ORAL EVERY 12 HOURS SCHEDULED
Qty: 5 TABLET | Refills: 0 | Status: SHIPPED | OUTPATIENT
Start: 2018-09-24 | End: 2018-09-27

## 2018-09-24 RX ORDER — POTASSIUM CHLORIDE 750 MG/1
20 TABLET, FILM COATED, EXTENDED RELEASE ORAL DAILY
Qty: 60 TABLET | Refills: 0 | Status: SHIPPED | OUTPATIENT
Start: 2018-09-24 | End: 2018-10-24

## 2018-09-24 RX ORDER — FUROSEMIDE 40 MG/1
40 TABLET ORAL 2 TIMES DAILY
Qty: 60 TABLET | Refills: 0 | OUTPATIENT
Start: 2018-09-24 | End: 2021-05-24 | Stop reason: HOSPADM

## 2018-09-24 RX ORDER — TAMSULOSIN HYDROCHLORIDE 0.4 MG/1
0.4 CAPSULE ORAL 2 TIMES DAILY
Qty: 60 CAPSULE | Refills: 0 | Status: SHIPPED | OUTPATIENT
Start: 2018-09-24 | End: 2018-10-24

## 2018-09-24 RX ADMIN — FUROSEMIDE 40 MG: 40 TABLET ORAL at 08:39

## 2018-09-24 RX ADMIN — TAMSULOSIN HYDROCHLORIDE 0.4 MG: 0.4 CAPSULE ORAL at 08:38

## 2018-09-24 RX ADMIN — INSULIN ASPART 2 UNITS: 100 INJECTION, SOLUTION INTRAVENOUS; SUBCUTANEOUS at 08:39

## 2018-09-24 RX ADMIN — VITAMIN D, TAB 1000IU (100/BT) 5000 UNITS: 25 TAB at 08:38

## 2018-09-24 RX ADMIN — ASPIRIN 81 MG: 81 TABLET, FILM COATED ORAL at 08:38

## 2018-09-24 RX ADMIN — LEVOTHYROXINE SODIUM 50 MCG: 50 TABLET ORAL at 06:36

## 2018-09-24 RX ADMIN — AMOXICILLIN AND CLAVULANATE POTASSIUM 1 TABLET: 875; 125 TABLET, FILM COATED ORAL at 08:39

## 2018-09-24 RX ADMIN — AMLODIPINE BESYLATE 5 MG: 5 TABLET ORAL at 08:39

## 2018-09-24 RX ADMIN — DOCUSATE SODIUM 100 MG: 100 CAPSULE, LIQUID FILLED ORAL at 08:38

## 2018-09-24 RX ADMIN — INSULIN ASPART 4 UNITS: 100 INJECTION, SOLUTION INTRAVENOUS; SUBCUTANEOUS at 12:55

## 2018-09-24 RX ADMIN — METOPROLOL TARTRATE 100 MG: 100 TABLET, FILM COATED ORAL at 08:39

## 2018-09-24 RX ADMIN — IPRATROPIUM BROMIDE AND ALBUTEROL SULFATE 3 ML: .5; 3 SOLUTION RESPIRATORY (INHALATION) at 13:16

## 2018-09-24 RX ADMIN — CLOPIDOGREL 75 MG: 75 TABLET, FILM COATED ORAL at 08:39

## 2018-09-25 ENCOUNTER — READMISSION MANAGEMENT (OUTPATIENT)
Dept: CALL CENTER | Facility: HOSPITAL | Age: 63
End: 2018-09-25

## 2018-09-25 LAB
BACTERIA SPEC AEROBE CULT: NORMAL
BACTERIA SPEC AEROBE CULT: NORMAL

## 2018-09-25 NOTE — OUTREACH NOTE
Prep Survey      Responses   Facility patient discharged from?  [place]   Is patient eligible?  Yes   Discharge diagnosis  **Pneumonia    Does the patient have one of the following disease processes/diagnoses(primary or secondary)?  COPD/Pneumonia   Is there a DME ordered?  Yes   What DME was ordered?  [employer]   Prep survey completed?  Yes          Mary Ellen Domingo RN

## 2018-09-27 ENCOUNTER — READMISSION MANAGEMENT (OUTPATIENT)
Dept: CALL CENTER | Facility: HOSPITAL | Age: 63
End: 2018-09-27

## 2018-09-27 NOTE — OUTREACH NOTE
COPD/PN Week 1 Survey      Responses   Facility patient discharged from?  [place]   Does the patient have one of the following disease processes/diagnoses(primary or secondary)?  COPD/Pneumonia   Is there a successful TCM telephone encounter documented?  No   Was the primary reason for admission:  Pneumonia   Week 1 attempt successful?  Yes   Call start time  1522   Call end time  1528   Discharge diagnosis  **Pneumonia    Meds reviewed with patient/caregiver?  Yes   Is the patient having any side effects they believe may be caused by any medication additions or changes?  No   Does the patient have all medications ordered at discharge?  Yes   Is the patient taking all medications as directed (includes completed medication regime)?  Yes   Does the patient have a primary care provider?   Yes   Does the patient have an appointment with their PCP or pulmonologist within 7 days of discharge?  Yes   Has home health visited the patient within 72 hours of discharge?  N/A   What DME was ordered?  [employer]   Has all DME been delivered?  Yes   Comments  Patient states that his wife is in the hospital and is going to be transferred to a rehab facility.   Did the patient receive a copy of their discharge instructions?  Yes   Nursing interventions  Reviewed instructions with patient   What is the patient's perception of their health status since discharge?  Same   Are the patient's immunizations up to date?   No   Nursing interventions  Advised patient to discuss with provider at next visit   Is the patient/caregiver able to teach back the hierarchy of who to call/visit for symptoms/problems? PCP, Specialist, Home health nurse, Urgent Care, ED, 911  Yes   Week 1 call completed?  Yes          Heike Barahona RN

## 2018-10-06 ENCOUNTER — READMISSION MANAGEMENT (OUTPATIENT)
Dept: CALL CENTER | Facility: HOSPITAL | Age: 63
End: 2018-10-06

## 2018-10-06 NOTE — OUTREACH NOTE
COPD/PN Week 2 Survey      Responses   Facility patient discharged from?  [place]   Does the patient have one of the following disease processes/diagnoses(primary or secondary)?  COPD/Pneumonia   Was the primary reason for admission:  Pneumonia   Week 2 attempt successful?  No   Unsuccessful attempts  Attempt 1          Cara Swann RN

## 2018-10-10 ENCOUNTER — READMISSION MANAGEMENT (OUTPATIENT)
Dept: CALL CENTER | Facility: HOSPITAL | Age: 63
End: 2018-10-10

## 2018-10-10 NOTE — OUTREACH NOTE
COPD/PN Week 2 Survey      Responses   Facility patient discharged from?  [facility]   Does the patient have one of the following disease processes/diagnoses(primary or secondary)?  COPD/Pneumonia   Was the primary reason for admission:  Pneumonia   Week 2 attempt successful?  Yes   Call start time  1436   Call end time  1444   Discharge diagnosis  **Pneumonia    Meds reviewed with patient/caregiver?  Yes   Is the patient taking all medications as directed (includes completed medication regime)?  Yes   Has the patient kept scheduled appointments due by today?  N/A   DME comments  Pt says he does not have any portable O2 Advised to call [facility] and let them know and explain that he needs instructions on how to use   What is the patient's perception of their health status since discharge?  Improving   Additional teach back comments  Pt says he is breathing Ok now with out O2. Wife is having surgery and will be spending night in hospital. Pt will be able to go home tonight. Ecnouraged to rest when possible.   Is the patient/caregiver able to teach back signs and symptoms of worsening condition:  Fever/chills, Shortness of breath, Chest pain   Is the patient/caregiver able to teach back importance of completing antibiotic course of treatment?  Yes   Week 2 call completed?  Yes          Tatiana Ramos RN

## 2018-10-16 ENCOUNTER — APPOINTMENT (OUTPATIENT)
Dept: LAB | Facility: HOSPITAL | Age: 63
End: 2018-10-16

## 2018-10-16 ENCOUNTER — OFFICE VISIT (OUTPATIENT)
Dept: CARDIOLOGY | Facility: CLINIC | Age: 63
End: 2018-10-16

## 2018-10-16 VITALS
DIASTOLIC BLOOD PRESSURE: 110 MMHG | HEIGHT: 72 IN | WEIGHT: 183.4 LBS | BODY MASS INDEX: 24.84 KG/M2 | HEART RATE: 82 BPM | SYSTOLIC BLOOD PRESSURE: 186 MMHG

## 2018-10-16 DIAGNOSIS — N18.9 CHRONIC KIDNEY DISEASE, UNSPECIFIED CKD STAGE: ICD-10-CM

## 2018-10-16 DIAGNOSIS — E78.49 OTHER HYPERLIPIDEMIA: ICD-10-CM

## 2018-10-16 DIAGNOSIS — E11.3399 TYPE 2 DIABETES MELLITUS WITH MODERATE NONPROLIFERATIVE RETINOPATHY WITHOUT MACULAR EDEMA, WITH LONG-TERM CURRENT USE OF INSULIN, UNSPECIFIED LATERALITY (HCC): ICD-10-CM

## 2018-10-16 DIAGNOSIS — Z79.4 TYPE 2 DIABETES MELLITUS WITH MODERATE NONPROLIFERATIVE RETINOPATHY WITHOUT MACULAR EDEMA, WITH LONG-TERM CURRENT USE OF INSULIN, UNSPECIFIED LATERALITY (HCC): ICD-10-CM

## 2018-10-16 DIAGNOSIS — I10 ESSENTIAL HYPERTENSION: ICD-10-CM

## 2018-10-16 DIAGNOSIS — I10 ESSENTIAL HYPERTENSION: Primary | ICD-10-CM

## 2018-10-16 DIAGNOSIS — I63.10 CEREBROVASCULAR ACCIDENT (CVA) DUE TO EMBOLISM OF PRECEREBRAL ARTERY (HCC): ICD-10-CM

## 2018-10-16 DIAGNOSIS — R29.818 SUSPECTED SLEEP APNEA: ICD-10-CM

## 2018-10-16 DIAGNOSIS — I50.42 CHRONIC COMBINED SYSTOLIC AND DIASTOLIC CONGESTIVE HEART FAILURE (HCC): Primary | ICD-10-CM

## 2018-10-16 LAB
ANION GAP SERPL CALCULATED.3IONS-SCNC: 8.5 MMOL/L
BUN BLD-MCNC: 18 MG/DL (ref 8–23)
BUN/CREAT SERPL: 11.8 (ref 7–25)
CALCIUM SPEC-SCNC: 9.7 MG/DL (ref 8.6–10.5)
CHLORIDE SERPL-SCNC: 102 MMOL/L (ref 98–107)
CO2 SERPL-SCNC: 29.5 MMOL/L (ref 22–29)
CREAT BLD-MCNC: 1.53 MG/DL (ref 0.76–1.27)
GFR SERPL CREATININE-BSD FRML MDRD: 46 ML/MIN/1.73
GLUCOSE BLD-MCNC: 171 MG/DL (ref 65–99)
POTASSIUM BLD-SCNC: 4.5 MMOL/L (ref 3.5–5.2)
SODIUM BLD-SCNC: 140 MMOL/L (ref 136–145)

## 2018-10-16 PROCEDURE — 99214 OFFICE O/P EST MOD 30 MIN: CPT | Performed by: NURSE PRACTITIONER

## 2018-10-16 PROCEDURE — 36415 COLL VENOUS BLD VENIPUNCTURE: CPT | Performed by: NURSE PRACTITIONER

## 2018-10-16 PROCEDURE — 93000 ELECTROCARDIOGRAM COMPLETE: CPT | Performed by: NURSE PRACTITIONER

## 2018-10-16 PROCEDURE — 80048 BASIC METABOLIC PNL TOTAL CA: CPT | Performed by: NURSE PRACTITIONER

## 2018-10-16 RX ORDER — AMOXICILLIN AND CLAVULANATE POTASSIUM 875; 125 MG/1; MG/1
1 TABLET, FILM COATED ORAL 2 TIMES DAILY
COMMUNITY
End: 2018-11-07

## 2018-10-16 NOTE — PROGRESS NOTES
Date of Office Visit: 10/16/2018  Encounter Provider: Nadine Blake, JULIO C, APRN  Place of Service: [facility] CARDIOLOGY  Patient Name: Carlito Mo  :1955      Subjective:     Chief Complaint:  Hospital follow-up congestive heart failure, acute respiratory failure, chronic ischemic heart disease.    History of Present Illness:  Carlito Mo is a 63 y.o. male patient of Dr. Murguia.  This is my first time seeing this patient in the office and I reviewed his records.    Patient has a history of coronary artery disease, congestive heart failure, respiratory failure, chronic kidney disease, hypothyroidism, type 2 diabetes.    Patient was first seen by Dr. Murguia 2017 when he was admitted with altered mental status.  He was diagnosed with multiple small acute infarcts.  Echocardiogram 2017 showed mild left ventricular hypertrophy, mild left atrial enlargement, negative bubble study, normal LV systolic function with an ejection fraction of 60%.  Transesophageal echo 2017 showed that his LV function was mildly low with an ejection fraction of 45% with focal wall motion abnormalities primarily in the septum and anterior wall and apex.  Small patent foramen ovale was noted and no significant valvular heart disease or cardiac source of emboli minus a small PFO were seen.  Holter monitor showed a couple of short bursts of SVT but no sustained arrhythmia.    He came back to the hospital in 2017 again with altered mental status.  At this time he had an echocardiogram which shows ejection fraction was 63%, grade 1 diastolic dysfunction and no significant valvular heart disease.  He wore a Zio patch which showed PACs, PVCs and nonsustained SVT.     He was back in the hospital in May 2018 with acute mental status changes and a blood pressure of 212/109.  He was noted at that time to have a chronically elevated troponin without acute EKG changes.    Patient was  last seen in office by Dr. Murguia 7/31/18.  At this point it was noted that this was the first time he had been seen in the office.  His biggest complaint was fatigue.  His wife thought that he may be depressed.  He was not sleeping well at night.  He would stay home a lot in bed.  He reported shortness of breath both at rest and with exertion.  He denied chest pain or palpitations.  He reported some chronic left leg swelling but no acute edema.  He was instructed to follow-up in 6 months or sooner if needed.    Patient was admitted to the hospital 9/19/18-9/24/18 due to postoperative hypoxia and persistent lethargy after having a colonoscopy.  He underwent workup for the hypoxia, including chest x-ray followed by chest CT and these were suggestive of pneumonia.  Patient had a significantly elevated pro calcitonin and white blood cell count.  He was started on Zosyn and responded well to antibiotics.  He was transitioned to Augmentin to complete one week total course of antibiotics.  He was seen by cardiology for potential heart failure exacerbation.  He was found to have a reduced ejection fraction compared with prior studies and was felt to have mild exacerbation of heart failure.  Patient with diuresis.  He was able to be taken off of oxygen during the day and only required 2 L at night.  He was prescribed oxygen to wear at nighttime upon discharge.  He had some urinary retention and was seen by urology and started on Flomax.  Patient was felt to be medically stable.  He was discharged home and instructed to follow-up outpatient with gastroenterology, cardiology, and primary care.    Patient presents to office today for hospital follow-up appointment.  Patient reports he has been feeling tired since hospital discharge due to his wife recently braking her leg so he has been having to take care of her as well as taking care of their special needs son.  Patient reports a history of some chronic intermittent chest wall  pain and muscle soreness that occurs to a scar that he has; reproducible with palpation.  He denies any chest pain with activities.  He has a history of some shortness of breath with activities and reports that this is about the same as it was since hospital discharge, not worsening.  He has a history of some mild intermittent dependent edema.  He reports he has some chronic intermittent dizziness when he goes down the stairs but that this is not new or worsening.  Patient denies any palpitations, racing heartbeat sensation, syncope, near-syncope, recent falls, abnormal bleeding.  Patient was discharged on Lasix 40 mg twice a day, however he thinks that he may only be taking the Lasix once a day.  He will check Lasix dose when he gets home.  Patient's blood pressure is elevated in the office today however he reports he has not taken his blood pressure medications yet today and that his blood pressure outside the office has been staying closer to 140/90.  At this point we would recommend sleep medicine referral to rule out sleep apnea.  Patient has a follow-up appointment with primary care provider 10/30/18 and will keep this as scheduled.  Patient to continue to monitor blood pressure regularly, keep a log, and call office with readings in about 2 weeks.    Patient has a follow-up appointment scheduled with Dr. Murguia 11/14/18.  He will keep his follow-up appointment as scheduled or follow-up sooner if needed for any new or worsening symptoms or other problems/concerns.        Past Medical History:   Diagnosis Date   • Acquired hypothyroidism    • Acute congestive heart failure (CMS/HCC)    • Acute respiratory failure with hypoxia (CMS/HCC)    • Acute sinusitis    • SYLVAIN (acute kidney injury) (CMS/HCC)     on CKD   • Anemia    • Arthritis    • Cancer (CMS/HCC)     skin   • Chronic ischemic heart disease    • CKD (chronic kidney disease)    • Depression    • Diabetes mellitus type 2, uncontrolled, without complications  (CMS/HCC)    • Disease of thyroid gland    • Fatigue    • Heart attack (CMS/HCC)    • History of coronary artery disease     with remote history of bypass surgery in 2001   • History of transfusion    • Hyperglycemia    • Hyperlipidemia    • Hypertension    • Hypertensive encephalopathy    • Mental status change     Acute   • Pneumonia    • RBBB (right bundle branch block)    • Screening for prostate cancer 2011   • Stroke (CMS/HCC)    • TIA (transient ischemic attack)    • Type 2 diabetes mellitus (CMS/HCC)    • Urinary retention    • Vitamin D deficiency      Past Surgical History:   Procedure Laterality Date   • APPENDECTOMY N/A 02/14/2016    Dr. Alexey Dodson   • COLONOSCOPY      over 20 years ago.  no polyps    • COLONOSCOPY N/A 9/18/2018    Procedure: COLONOSCOPY;  Surgeon: Jose Enrique Louie MD;  Location: [facility] ENDOSCOPY;  Service: Gastroenterology   • COLONOSCOPY N/A 9/19/2018    Procedure: COLONOSCOPY into cecum and TI with hot snare polypectomies, with ascending polyp lifted with 3 ml saline and tattooed with 1 ml ink;  Surgeon: Jose Enrique Louie MD;  Location: [facility] ENDOSCOPY;  Service: Gastroenterology   • CORONARY ARTERY BYPASS GRAFT  11/2001   • TONSILLECTOMY     • VASECTOMY       Outpatient Medications Prior to Visit   Medication Sig Dispense Refill   • amLODIPine (NORVASC) 5 MG tablet Take 1 tablet by mouth Daily for 180 days. 90 tablet 1   • aspirin 81 MG EC tablet Take 1 tablet by mouth Daily for 180 days. 90 tablet 1   • atorvastatin (LIPITOR) 40 MG tablet Take 1 tablet by mouth Every Night for 180 days. 90 tablet 1   • buPROPion XL (WELLBUTRIN XL) 150 MG 24 hr tablet Take 1 tablet by mouth Daily for 180 days. 90 tablet 1   • clopidogrel (PLAVIX) 75 MG tablet Take 1 tablet by mouth Daily for 180 days. 90 tablet 1   • docusate sodium (COLACE) 100 MG capsule Take 1 capsule by mouth 2 (two) times a day. (Patient taking differently: Take 100 mg by mouth Daily.) 30 capsule 0   • furosemide  (LASIX) 40 MG tablet Take 1 tablet by mouth 2 (Two) Times a Day for 30 days. 60 tablet 0   • insulin glulisine (APIDRA) 100 UNIT/ML injection Inject  under the skin into the appropriate area as directed 3 (Three) Times a Day Before Meals. Sliding scale     • Insulin Pen Needle (B-D ULTRAFINE III SHORT PEN) 31G X 8 MM misc Use to inject 4x daily 150 each 5   • levothyroxine (SYNTHROID) 50 MCG tablet Take 1 tablet by mouth Daily. 30 tablet 5   • metoprolol tartrate (LOPRESSOR) 100 MG tablet Take 1 tablet by mouth 2 (Two) Times a Day for 180 days. 180 tablet 1   • potassium chloride (K-DUR) 10 MEQ CR tablet Take 2 tablets by mouth Daily for 30 days. 60 tablet 0   • RELION GLUCOSE TEST STRIPS test strip 1 each by Other route 2 (two) times a day. Use as instructed     • tamsulosin (FLOMAX) 0.4 MG capsule 24 hr capsule Take 1 capsule by mouth 2 (Two) Times a Day for 30 days. 60 capsule 0   • vitamin D (ERGOCALCIFEROL) 05172 units capsule capsule Take 1 cap 3 times weekly 24 capsule 1     No facility-administered medications prior to visit.        Allergies as of 10/16/2018 - Reviewed 10/16/2018   Allergen Reaction Noted   • Fluoxetine Other (See Comments) 03/12/2018   • Prozac [fluoxetine hcl] Other (See Comments) 12/17/2015     Social History     Social History   • Marital status:      Spouse name: N/A   • Number of children: N/A   • Years of education: N/A     Occupational History   • Not on file.     Social History Main Topics   • Smoking status: Never Smoker   • Smokeless tobacco: Never Used      Comment: daily caffiene   • Alcohol use No   • Drug use: No   • Sexual activity: Defer     Other Topics Concern   • Not on file     Social History Narrative   • No narrative on file     Family History   Problem Relation Age of Onset   • Diabetes Mother    • Hypertension Mother    • Macular degeneration Mother    • Alcohol abuse Father    • Cancer Father         lung   • Alcohol abuse Brother        Review of Systems  "  Constitution: Positive for malaise/fatigue. Negative for chills, fever, night sweats, weight gain and weight loss.   HENT: Negative for ear pain, hearing loss, nosebleeds and sore throat.    Eyes: Negative for blurred vision, double vision, redness, vision loss in left eye, vision loss in right eye and visual disturbance.   Cardiovascular:        SEE HPI.    Respiratory: Positive for cough. Negative for hemoptysis, shortness of breath, snoring and wheezing.    Endocrine: Negative for cold intolerance and heat intolerance.   Skin: Negative for itching, rash and suspicious lesions.   Musculoskeletal: Negative for joint pain, joint swelling and myalgias.   Gastrointestinal: Negative for abdominal pain, diarrhea, hematemesis, melena, nausea and vomiting.   Genitourinary: Negative for dysuria, frequency and hematuria.   Neurological: Negative for dizziness, headaches, numbness, paresthesias and seizures.   Psychiatric/Behavioral: Positive for depression. Negative for altered mental status. The patient is not nervous/anxious.           Objective:     Vitals:    10/16/18 1336   BP: (!) 186/110   BP Location: Right arm   Pulse: 82   Weight: 83.2 kg (183 lb 6.4 oz)   Height: 182.9 cm (72\")   Patient reports he has not taken BP meds yet today.  Asymptomatic in office.      Body mass index is 24.87 kg/m².        PHYSICAL EXAM:  Physical Exam   Constitutional: He is oriented to person, place, and time. He appears well-developed and well-nourished. No distress.   HENT:   Head: Normocephalic and atraumatic.   Eyes: Pupils are equal, round, and reactive to light. No scleral icterus.   Neck: Neck supple. No JVD present. Carotid bruit is not present. No tracheal deviation present.   Cardiovascular: Normal rate, regular rhythm, normal heart sounds and intact distal pulses.  Exam reveals no gallop and no friction rub.    No murmur heard.  Pulses:       Radial pulses are 2+ on the right side, and 2+ on the left side.        Posterior " tibial pulses are 2+ on the right side, and 2+ on the left side.   Pulmonary/Chest: Effort normal and breath sounds normal. No respiratory distress. He has no wheezes. He has no rales.   Abdominal: Soft. Bowel sounds are normal. He exhibits no distension. There is no tenderness. There is no rebound and no guarding.   Musculoskeletal: He exhibits edema (1+ bilateral ankle edema.  ). He exhibits no tenderness or deformity.   Neurological: He is alert and oriented to person, place, and time.   Skin: Skin is warm and dry. No rash noted. He is not diaphoretic. No erythema.   Psychiatric: He has a normal mood and affect. His behavior is normal. Judgment normal.             ECG 12 Lead  Date/Time: 10/16/2018 1:51 PM  Performed by: ZUNILDA MARX  Authorized by: ZUNILDA MARX   Comparison: compared with previous ECG from 9/22/2018  Similar to previous ECG  Rhythm: sinus rhythm  Rate: normal  BPM: 82  Conduction: right bundle branch block  Other findings comments: anterior infarct, age indeterminate  Clinical impression: abnormal ECG            Assessment:       Diagnosis Plan   1. Chronic combined systolic and diastolic congestive heart failure (CMS/HCC)     2. Essential hypertension  ECG 12 Lead   3. Suspected sleep apnea  Ambulatory Referral to Sleep Medicine   4. Chronic kidney disease, unspecified CKD stage     5. Other hyperlipidemia     6. Cerebrovascular accident (CVA) due to embolism of precerebral artery (CMS/HCC)     7. 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 Chronic systolic and diastolic heart failure: Patient denies any worsening shortness of breath since hospital discharge.  He reports mild intermittent lower extremity edema.  He denies worsening edema since hospital discharge.  2. History of stroke: Patient takes aspirin 81 mg daily, atorvastatin 40 mg  nightly, Plavix 75 mg daily.  3. Coronary artery disease: History of bypass surgery in 2001.  Patient on aspirin, atorvastatin.  4. Hypertension: Patient on amlodipine, Lasix, metoprolol.  Patient unsure of the current Lasix dose that he is taking.  5. Hyperlipidemia: Managed by outside provider.  Patient on atorvastatin daily.  6. Diabetes: Managed by outside provider.  Patient on insulin therapy.  7. Chronic kidney disease  8. Chronically elevated troponin  9. Right bundle branch block: Still present on today's EKG.      Keep your 11/14/18 follow-up appointment with Dr. Murguia as scheduled or follow-up sooner if needed for any new or worsening symptoms or other problems/concerns.             Your medication list          Accurate as of 10/16/18 11:59 PM. If you have any questions, ask your nurse or doctor.               CHANGE how you take these medications      Instructions Last Dose Given Next Dose Due   docusate sodium 100 MG capsule  Commonly known as:  COLACE  What changed:  when to take this      Take 1 capsule by mouth 2 (two) times a day.          CONTINUE taking these medications      Instructions Last Dose Given Next Dose Due   amLODIPine 5 MG tablet  Commonly known as:  NORVASC      Take 1 tablet by mouth Daily for 180 days.       amoxicillin-clavulanate 875-125 MG per tablet  Commonly known as:  AUGMENTIN      Take 1 tablet by mouth 2 (Two) Times a Day.       APIDRA 100 UNIT/ML injection  Generic drug:  insulin glulisine      Inject  under the skin into the appropriate area as directed 3 (Three) Times a Day Before Meals. Sliding scale       aspirin 81 MG EC tablet      Take 1 tablet by mouth Daily for 180 days.       atorvastatin 40 MG tablet  Commonly known as:  LIPITOR      Take 1 tablet by mouth Every Night for 180 days.       buPROPion  MG 24 hr tablet  Commonly known as:  WELLBUTRIN XL      Take 1 tablet by mouth Daily for 180 days.       clopidogrel 75 MG tablet  Commonly known as:  PLAVIX       Take 1 tablet by mouth Daily for 180 days.       furosemide 40 MG tablet  Commonly known as:  LASIX      Take 1 tablet by mouth 2 (Two) Times a Day for 30 days.       Insulin Pen Needle 31G X 8 MM misc  Commonly known as:  B-D ULTRAFINE III SHORT PEN      Use to inject 4x daily       levothyroxine 50 MCG tablet  Commonly known as:  SYNTHROID      Take 1 tablet by mouth Daily.       metoprolol tartrate 100 MG tablet  Commonly known as:  LOPRESSOR      Take 1 tablet by mouth 2 (Two) Times a Day for 180 days.       potassium chloride 10 MEQ CR tablet  Commonly known as:  K-DUR      Take 2 tablets by mouth Daily for 30 days.       RELION GLUCOSE TEST STRIPS test strip  Generic drug:  glucose blood      1 each by Other route 2 (two) times a day. Use as instructed       tamsulosin 0.4 MG capsule 24 hr capsule  Commonly known as:  FLOMAX      Take 1 capsule by mouth 2 (Two) Times a Day for 30 days.       vitamin D 83609 units capsule capsule  Commonly known as:  ERGOCALCIFEROL      Take 1 cap 3 times weekly          I did not stop or change the above medications.  Patient's medication list was updated to reflect medications he is currently taking including medication changes made by other providers.               Nadine Blake, JULIO C, APRN  10/16/2018       Dictated utilizing Dragon dictation

## 2018-10-19 ENCOUNTER — READMISSION MANAGEMENT (OUTPATIENT)
Dept: CALL CENTER | Facility: HOSPITAL | Age: 63
End: 2018-10-19

## 2018-10-22 ENCOUNTER — TELEPHONE (OUTPATIENT)
Dept: CARDIOLOGY | Facility: CLINIC | Age: 63
End: 2018-10-22

## 2018-10-22 NOTE — TELEPHONE ENCOUNTER
Please let patient know that his recent BMP looks pretty similar to his previous one.    He needs to keep his November follow-up appointment with Dr. Murguia as scheduled.    We will repeat a BMP level when he comes into the office in November.    He also needs to follow-up with his primary care provider if he has not already done so.    His blood sugar was elevated and he needs to follow-up with his primary care provider regarding this.

## 2018-10-28 ENCOUNTER — READMISSION MANAGEMENT (OUTPATIENT)
Dept: CALL CENTER | Facility: HOSPITAL | Age: 63
End: 2018-10-28

## 2018-10-28 NOTE — OUTREACH NOTE
COPD/PN Week 4 Survey      Responses   Facility patient discharged from?  [facility]   Does the patient have one of the following disease processes/diagnoses(primary or secondary)?  COPD/Pneumonia   Was the primary reason for admission:  Pneumonia   Week 4 attempt successful?  No          Heike Barahona RN

## 2018-10-29 ENCOUNTER — TELEPHONE (OUTPATIENT)
Dept: CARDIOLOGY | Facility: CLINIC | Age: 63
End: 2018-10-29

## 2018-10-29 PROBLEM — R29.818 SUSPECTED SLEEP APNEA: Status: ACTIVE | Noted: 2018-10-29

## 2018-11-07 ENCOUNTER — OFFICE VISIT (OUTPATIENT)
Dept: GASTROENTEROLOGY | Facility: CLINIC | Age: 63
End: 2018-11-07

## 2018-11-07 VITALS
HEIGHT: 72 IN | BODY MASS INDEX: 23.86 KG/M2 | TEMPERATURE: 98.1 F | DIASTOLIC BLOOD PRESSURE: 80 MMHG | SYSTOLIC BLOOD PRESSURE: 128 MMHG | WEIGHT: 176.2 LBS

## 2018-11-07 DIAGNOSIS — K59.00 CONSTIPATION, UNSPECIFIED CONSTIPATION TYPE: Primary | ICD-10-CM

## 2018-11-07 DIAGNOSIS — D12.6 ADENOMATOUS POLYP OF COLON, UNSPECIFIED PART OF COLON: ICD-10-CM

## 2018-11-07 PROCEDURE — 99213 OFFICE O/P EST LOW 20 MIN: CPT | Performed by: INTERNAL MEDICINE

## 2018-11-07 NOTE — PROGRESS NOTES
Chief Complaint   Patient presents with   • Follow-up   • Constipation        Carlito oM is a  63 y.o. male here for a follow up visit for polyps, history of constipation    HPI this 63-year-old white male patient of Dr. Jyoti Reynolds returns in follow-up since he had undergone colonoscopic evaluation September 19.  That study revealed polyps in the sigmoid and transverse colon as well as the ascending colon.  The ascending colon polyp was hyperplastic and removed in a piecemeal fashion with tattoo applied.  The other polyps were removed in total and all 3 of those were adenomatous.  I advised she have follow-up examination of his colon in 3 years time.  He was admitted post-procedurally in September because of level of sedation and respiratory depression.  He did undergo evaluation by the cardiology and urology services at that time.  He does have a history of constipation but reports this is well controlled at present.  I advised he call as needed for follow-up otherwise we will anticipate follow-up examination of his colon in September 2021.    Past Medical History:   Diagnosis Date   • Acquired hypothyroidism    • Acute congestive heart failure (CMS/HCC)    • Acute respiratory failure with hypoxia (CMS/HCC)    • Acute sinusitis    • SYLVAIN (acute kidney injury) (CMS/HCC)     on CKD   • Anemia    • Arthritis    • Cancer (CMS/HCC)     skin   • Chronic ischemic heart disease    • CKD (chronic kidney disease)    • Depression    • Diabetes mellitus type 2, uncontrolled, without complications (CMS/HCC)    • Disease of thyroid gland    • Fatigue    • Heart attack (CMS/HCC)    • History of coronary artery disease     with remote history of bypass surgery in 2001   • History of transfusion    • Hyperglycemia    • Hyperlipidemia    • Hypertension    • Hypertensive encephalopathy    • Mental status change     Acute   • Pneumonia    • RBBB (right bundle branch block)    • Screening for prostate cancer 2011   • Stroke  (CMS/[facility])    • TIA (transient ischemic attack)    • Type 2 diabetes mellitus (CMS/[facility])    • Urinary retention    • Vitamin D deficiency        Current Outpatient Prescriptions   Medication Sig Dispense Refill   • amLODIPine (NORVASC) 5 MG tablet Take 1 tablet by mouth Daily for 180 days. 90 tablet 1   • aspirin 81 MG EC tablet Take 1 tablet by mouth Daily for 180 days. 90 tablet 1   • atorvastatin (LIPITOR) 40 MG tablet Take 1 tablet by mouth Every Night for 180 days. 90 tablet 1   • buPROPion XL (WELLBUTRIN XL) 150 MG 24 hr tablet Take 1 tablet by mouth Daily for 180 days. 90 tablet 1   • clopidogrel (PLAVIX) 75 MG tablet Take 1 tablet by mouth Daily for 180 days. 90 tablet 1   • docusate sodium (COLACE) 100 MG capsule Take 1 capsule by mouth 2 (two) times a day. (Patient taking differently: Take 100 mg by mouth Daily.) 30 capsule 0   • insulin glulisine (APIDRA) 100 UNIT/ML injection Inject  under the skin into the appropriate area as directed 3 (Three) Times a Day Before Meals. Sliding scale     • Insulin Pen Needle (B-D ULTRAFINE III SHORT PEN) 31G X 8 MM misc Use to inject 4x daily 150 each 5   • levothyroxine (SYNTHROID) 50 MCG tablet Take 1 tablet by mouth Daily. 30 tablet 5   • metoprolol tartrate (LOPRESSOR) 100 MG tablet Take 1 tablet by mouth 2 (Two) Times a Day for 180 days. 180 tablet 1   • RELION GLUCOSE TEST STRIPS test strip 1 each by Other route 2 (two) times a day. Use as instructed     • vitamin D (ERGOCALCIFEROL) 77250 units capsule capsule Take 1 cap 3 times weekly 24 capsule 1     No current facility-administered medications for this visit.        PRN Meds:.    Allergies   Allergen Reactions   • Fluoxetine Other (See Comments)   • Prozac [Fluoxetine Hcl] Other (See Comments)     shaking       Social History     Social History   • Marital status:      Spouse name: N/A   • Number of children: N/A   • Years of education: N/A     Occupational History   • Not on file.     Social History Main  Topics   • Smoking status: Never Smoker   • Smokeless tobacco: Never Used      Comment: daily caffiene   • Alcohol use No   • Drug use: No   • Sexual activity: Defer     Other Topics Concern   • Not on file     Social History Narrative   • No narrative on file       Family History   Problem Relation Age of Onset   • Diabetes Mother    • Hypertension Mother    • Macular degeneration Mother    • Alcohol abuse Father    • Cancer Father         lung   • Alcohol abuse Brother        Review of Systems   Constitutional: Negative for activity change, appetite change, fatigue and unexpected weight change.   HENT: Negative for congestion, facial swelling, sore throat, trouble swallowing and voice change.    Eyes: Negative for photophobia and visual disturbance.   Respiratory: Negative for cough and choking.    Cardiovascular: Negative for chest pain.   Gastrointestinal: Positive for constipation. Negative for abdominal distention, abdominal pain, anal bleeding, blood in stool, diarrhea, nausea, rectal pain and vomiting.   Endocrine: Negative for polyphagia.   Musculoskeletal: Negative for arthralgias, gait problem and joint swelling.   Skin: Negative for color change, pallor and rash.   Allergic/Immunologic: Negative for food allergies.   Neurological: Negative for speech difficulty and headaches.   Hematological: Does not bruise/bleed easily.   Psychiatric/Behavioral: Negative for agitation, confusion and sleep disturbance.       Vitals:    11/07/18 0904   BP: 128/80   Temp: 98.1 °F (36.7 °C)       Physical Exam   Constitutional: He is oriented to person, place, and time. He appears well-developed and well-nourished.   HENT:   Head: Normocephalic.   Mouth/Throat: Oropharynx is clear and moist.   Eyes: Conjunctivae and EOM are normal.   Neck: Normal range of motion.   Cardiovascular: Normal rate and regular rhythm.    Pulmonary/Chest: Breath sounds normal.   Abdominal: Soft. Bowel sounds are normal.   Musculoskeletal: Normal  range of motion.   Neurological: He is alert and oriented to person, place, and time.   Skin: Skin is warm and dry.   Psychiatric: He has a normal mood and affect. His behavior is normal.       ASSESSMENT  #1 constipation  #2 history of polyps      PLAN  Office follow-up as needed  Follow-up colonoscopy September 2021      ICD-10-CM ICD-9-CM   1. Constipation, unspecified constipation type K59.00 564.00   2. Adenomatous polyp of colon, unspecified part of colon D12.6 211.3

## 2018-11-14 ENCOUNTER — OFFICE VISIT (OUTPATIENT)
Dept: CARDIOLOGY | Facility: CLINIC | Age: 63
End: 2018-11-14

## 2018-11-14 VITALS
BODY MASS INDEX: 23.57 KG/M2 | HEART RATE: 62 BPM | HEIGHT: 72 IN | WEIGHT: 174 LBS | SYSTOLIC BLOOD PRESSURE: 150 MMHG | DIASTOLIC BLOOD PRESSURE: 88 MMHG | RESPIRATION RATE: 16 BRPM

## 2018-11-14 DIAGNOSIS — I50.43 ACUTE ON CHRONIC COMBINED SYSTOLIC AND DIASTOLIC CONGESTIVE HEART FAILURE (HCC): Primary | ICD-10-CM

## 2018-11-14 PROCEDURE — 99214 OFFICE O/P EST MOD 30 MIN: CPT | Performed by: INTERNAL MEDICINE

## 2018-11-14 PROCEDURE — 93000 ELECTROCARDIOGRAM COMPLETE: CPT | Performed by: INTERNAL MEDICINE

## 2018-11-14 NOTE — PROGRESS NOTES
PATIENTINFORMATION    Date of Office Visit: 2018  Encounter Provider: Amber Murguia MD  Place of Service: [facility] CARDIOLOGY  Patient Name: Carlito Mo  : 1955    Subjective:     Encounter Date:2018      Patient ID: Carlito Mo is a 63 y.o. male.      History of Present Illness    Mr. Carlito Mo is a 62 year old male patient with a history of CAD/MI (CABG ), HTN, hyperlipidemia, type 2 diabetes mellitus, anemia, chronic kidney disease, and basal cell carcinoma.  I first saw him in 2017 when he was admitted with altered mental status.  He was diagnosed with multiple small acute infarcts.  His echocardiogram from 2017 showed mild left ventricular hypertrophy, mild left atrial enlargement, negative bubble study, normal LV systolic function with an ejection fraction of 68%.  Transesophageal echo in 2017 showed that his LV function was mildly low with an ejection fraction of 45% with focal wall motion abnormalities primarily in the septum and anterior wall and apex.  Small patent foramen ovale was noted there was no significant valvular heart disease or cardiac source of embolus minus the small PFO.  Holter monitor showed a couple of short bursts of SVT but no sustained arrhythmia.      He came back to the hospital in 2017 again with altered mental status.  At this time he had an echocardiogram which shows ejection fraction was 63%, grade 1 diastolic dysfunction and no significant valvular heart disease.  He wore a Zio patch which showed PACs, PVCs and nonsustained SVT.     He was back in the hospital in May 2018 with acute mental status changes and a blood pressure of 212/109.  He was noted at that time to have a chronically elevated troponin without acute EKG changes.     Sadly he was hospitalized again in 2018 after he was hypoxic and persistently lethargic after a colonoscopy.  During that admission he did have an  echocardiogram which revealed his LV function had decreased and his ejection fraction was now 46%.  There was grade II diastolic dysfunction and mildly elevated pulmonary pressures with a left pleural effusion.  He was treated for pneumonia.  He was also diuresed and seemed to be back to his baseline although he was discharged with 2 liters of oxygen at night.      He comes in to followup with me today and appears to be a bit more unkempt than usual.  His wife broke her leg and is in a rehabilitation facility.  They have a son who is on the autism spectrum, and cannot take care of himself, and so the patient has been trying to help his son.  He feels like his breathing is better.  He still has some occasional edema.  This is better when he uses Lasix.  He has been using the oxygen at night on an as needed basis if he feels short of breath.  He is no longer having chest pain.      Review of Systems   Constitution: Positive for malaise/fatigue. Negative for fever, weight gain and weight loss.   HENT: Negative for ear pain, hearing loss, nosebleeds and sore throat.    Eyes: Negative for double vision, pain, vision loss in left eye and vision loss in right eye.   Cardiovascular:        See history of present illness.   Respiratory: Negative for cough, shortness of breath, sleep disturbances due to breathing, snoring and wheezing.    Endocrine: Positive for cold intolerance and heat intolerance. Negative for polyuria.   Skin: Negative for itching, poor wound healing and rash.   Musculoskeletal: Negative for joint pain, joint swelling and myalgias.   Gastrointestinal: Negative for abdominal pain, diarrhea, hematochezia, nausea and vomiting.   Genitourinary: Negative for hematuria and hesitancy.   Neurological: Negative for numbness, paresthesias and seizures.   Psychiatric/Behavioral: Positive for depression. The patient is nervous/anxious.            ECG 12 Lead  Date/Time: 11/14/2018 1:34 PM  Performed by: Shantal  "Amber SIMONS MD  Authorized by: Amber Murguia MD   Comparison: compared with previous ECG from 10/16/2018  Similar to previous ECG  Rhythm: sinus rhythm  BPM: 62  Conduction: right bundle branch block  Other findings: LVH with strain  Clinical impression: abnormal ECG               Objective:     /88 (BP Location: Right arm, Patient Position: Sitting, Cuff Size: Adult)   Pulse 62   Resp 16   Ht 182.9 cm (72\")   Wt 78.9 kg (174 lb)   BMI 23.60 kg/m²  Body mass index is 23.6 kg/m².     Physical Exam   Constitutional: He appears well-developed. He has a sickly appearance.   HENT:   Head: Normocephalic and atraumatic.   Eyes: Conjunctivae and lids are normal. Pupils are equal, round, and reactive to light. Lids are everted and swept, no foreign bodies found.   Neck: Normal range of motion. No JVD present. Carotid bruit is not present. No tracheal deviation present. No thyroid mass present.   Cardiovascular: Normal rate, regular rhythm and normal heart sounds.   Pulses:       Dorsalis pedis pulses are 2+ on the right side, and 2+ on the left side.   Pulmonary/Chest: Effort normal and breath sounds normal.   Abdominal: Normal appearance and bowel sounds are normal.   Musculoskeletal: Normal range of motion.   Neurological: He is alert. He has normal strength.   Skin: Skin is warm, dry and intact.   Psychiatric: He has a normal mood and affect. His behavior is normal.   Vitals reviewed.          Assessment/Plan:          1.  Acute on chronic systolic and diastolic heart failure.  On oral Lasix.  BUN and creatinine stable.  He appears euvolemic.  Heart Failure  Subjective/Objective    • Physical exam findings negative for elevated JVP.      2.  History of stroke.  Transesophageal echocardiogram did not show cardiac source of embolus.  Zio patch did not show atrial fibrillation.  3.  History of coronary artery disease with remote history of bypass surgery in 2001.  3.  Hypertension.  Controlled.   4. "  Hyperlipidemia.  He is supposed to be on atorvastatin but has questionable compliance.  5.  Diabetes  6.  Chronic kidney disease  7.  Fatigue and lack of energy.  8.  Right bundle branch block  9.  Chronically elevated troponin.    He will see Massiel in 3 months      Orders Placed This Encounter   Procedures   • ECG 12 Lead     This order was created via procedure documentation        Discharge Medications           Accurate as of 11/14/18  2:19 PM. If you have any questions, ask your nurse or doctor.               Changes to Medications      Instructions Start Date   docusate sodium 100 MG capsule  Commonly known as:  COLACE  What changed:  when to take this   100 mg, Oral, 2 Times Daily         Continue These Medications      Instructions Start Date   amLODIPine 5 MG tablet  Commonly known as:  NORVASC   5 mg, Oral, Daily      APIDRA 100 UNIT/ML injection  Generic drug:  insulin glulisine   Subcutaneous, 3 Times Daily Before Meals, Sliding scale       aspirin 81 MG EC tablet   81 mg, Oral, Daily      atorvastatin 40 MG tablet  Commonly known as:  LIPITOR   40 mg, Oral, Nightly      buPROPion  MG 24 hr tablet  Commonly known as:  WELLBUTRIN XL   150 mg, Oral, Daily      clopidogrel 75 MG tablet  Commonly known as:  PLAVIX   75 mg, Oral, Daily      Insulin Pen Needle 31G X 8 MM misc  Commonly known as:  B-D ULTRAFINE III SHORT PEN   Use to inject 4x daily      levothyroxine 50 MCG tablet  Commonly known as:  SYNTHROID   50 mcg, Oral, Daily      metoprolol tartrate 100 MG tablet  Commonly known as:  LOPRESSOR   100 mg, Oral, 2 Times Daily      RELION GLUCOSE TEST STRIPS test strip  Generic drug:  glucose blood   1 each, Other, 2 Times Daily, Use as instructed       vitamin D 19498 units capsule capsule  Commonly known as:  ERGOCALCIFEROL   Take 1 cap 3 times weekly                    Amber Murguia MD  11/14/18  2:19 PM

## 2018-11-30 ENCOUNTER — HOSPITAL ENCOUNTER (INPATIENT)
Facility: HOSPITAL | Age: 63
LOS: 4 days | Discharge: HOME | End: 2018-12-04
Attending: EMERGENCY MEDICINE | Admitting: INTERNAL MEDICINE

## 2018-11-30 ENCOUNTER — APPOINTMENT (OUTPATIENT)
Dept: GENERAL RADIOLOGY | Facility: HOSPITAL | Age: 63
End: 2018-11-30

## 2018-11-30 ENCOUNTER — APPOINTMENT (OUTPATIENT)
Dept: CT IMAGING | Facility: HOSPITAL | Age: 63
End: 2018-11-30

## 2018-11-30 DIAGNOSIS — N18.30 CKD (CHRONIC KIDNEY DISEASE) STAGE 3, GFR 30-59 ML/MIN (HCC): ICD-10-CM

## 2018-11-30 DIAGNOSIS — R59.1 LYMPHADENOPATHY: ICD-10-CM

## 2018-11-30 DIAGNOSIS — I50.9 ACUTE CONGESTIVE HEART FAILURE, UNSPECIFIED HEART FAILURE TYPE (HCC): Primary | ICD-10-CM

## 2018-11-30 DIAGNOSIS — R41.0 CONFUSION: ICD-10-CM

## 2018-11-30 DIAGNOSIS — J90 PLEURAL EFFUSION, BILATERAL: ICD-10-CM

## 2018-11-30 DIAGNOSIS — Z74.09 IMPAIRED FUNCTIONAL MOBILITY, BALANCE, GAIT, AND ENDURANCE: ICD-10-CM

## 2018-11-30 LAB
ALBUMIN SERPL-MCNC: 3.3 G/DL (ref 3.5–5.2)
ALBUMIN/GLOB SERPL: 1.3 G/DL
ALP SERPL-CCNC: 76 U/L (ref 39–117)
ALT SERPL W P-5'-P-CCNC: 19 U/L (ref 1–41)
ANION GAP SERPL CALCULATED.3IONS-SCNC: 9.8 MMOL/L
AST SERPL-CCNC: 16 U/L (ref 1–40)
BASOPHILS # BLD AUTO: 0 10*3/MM3 (ref 0–0.2)
BASOPHILS NFR BLD AUTO: 0 % (ref 0–1.5)
BILIRUB SERPL-MCNC: 0.3 MG/DL (ref 0.1–1.2)
BUN BLD-MCNC: 26 MG/DL (ref 8–23)
BUN/CREAT SERPL: 14.3 (ref 7–25)
CALCIUM SPEC-SCNC: 8.7 MG/DL (ref 8.6–10.5)
CHLORIDE SERPL-SCNC: 108 MMOL/L (ref 98–107)
CO2 SERPL-SCNC: 25.2 MMOL/L (ref 22–29)
CREAT BLD-MCNC: 1.82 MG/DL (ref 0.76–1.27)
DEPRECATED RDW RBC AUTO: 41.7 FL (ref 37–54)
EOSINOPHIL # BLD AUTO: 0.22 10*3/MM3 (ref 0–0.7)
EOSINOPHIL NFR BLD AUTO: 3.2 % (ref 0.3–6.2)
ERYTHROCYTE [DISTWIDTH] IN BLOOD BY AUTOMATED COUNT: 13.8 % (ref 11.5–14.5)
GFR SERPL CREATININE-BSD FRML MDRD: 38 ML/MIN/1.73
GLOBULIN UR ELPH-MCNC: 2.6 GM/DL
GLUCOSE BLD-MCNC: 212 MG/DL (ref 65–99)
HCT VFR BLD AUTO: 32.4 % (ref 40.4–52.2)
HGB BLD-MCNC: 10.1 G/DL (ref 13.7–17.6)
IMM GRANULOCYTES # BLD: 0 10*3/MM3 (ref 0–0.03)
IMM GRANULOCYTES NFR BLD: 0 % (ref 0–0.5)
LYMPHOCYTES # BLD AUTO: 1.46 10*3/MM3 (ref 0.9–4.8)
LYMPHOCYTES NFR BLD AUTO: 20.9 % (ref 19.6–45.3)
MCH RBC QN AUTO: 25.5 PG (ref 27–32.7)
MCHC RBC AUTO-ENTMCNC: 31.2 G/DL (ref 32.6–36.4)
MCV RBC AUTO: 81.8 FL (ref 79.8–96.2)
MONOCYTES # BLD AUTO: 0.64 10*3/MM3 (ref 0.2–1.2)
MONOCYTES NFR BLD AUTO: 9.2 % (ref 5–12)
NEUTROPHILS # BLD AUTO: 4.66 10*3/MM3 (ref 1.9–8.1)
NEUTROPHILS NFR BLD AUTO: 66.7 % (ref 42.7–76)
NT-PROBNP SERPL-MCNC: 2555 PG/ML (ref 5–900)
PLATELET # BLD AUTO: 224 10*3/MM3 (ref 140–500)
PMV BLD AUTO: 10.4 FL (ref 6–12)
POTASSIUM BLD-SCNC: 4.5 MMOL/L (ref 3.5–5.2)
PROT SERPL-MCNC: 5.9 G/DL (ref 6–8.5)
RBC # BLD AUTO: 3.96 10*6/MM3 (ref 4.6–6)
SODIUM BLD-SCNC: 143 MMOL/L (ref 136–145)
TROPONIN T SERPL-MCNC: 0.05 NG/ML (ref 0–0.03)
WBC NRBC COR # BLD: 6.98 10*3/MM3 (ref 4.5–10.7)

## 2018-11-30 PROCEDURE — 84484 ASSAY OF TROPONIN QUANT: CPT | Performed by: EMERGENCY MEDICINE

## 2018-11-30 PROCEDURE — 93005 ELECTROCARDIOGRAM TRACING: CPT | Performed by: EMERGENCY MEDICINE

## 2018-11-30 PROCEDURE — 71046 X-RAY EXAM CHEST 2 VIEWS: CPT

## 2018-11-30 PROCEDURE — 93010 ELECTROCARDIOGRAM REPORT: CPT | Performed by: INTERNAL MEDICINE

## 2018-11-30 PROCEDURE — 85025 COMPLETE CBC W/AUTO DIFF WBC: CPT | Performed by: EMERGENCY MEDICINE

## 2018-11-30 PROCEDURE — 83880 ASSAY OF NATRIURETIC PEPTIDE: CPT | Performed by: EMERGENCY MEDICINE

## 2018-11-30 PROCEDURE — 70450 CT HEAD/BRAIN W/O DYE: CPT

## 2018-11-30 PROCEDURE — 99285 EMERGENCY DEPT VISIT HI MDM: CPT

## 2018-11-30 PROCEDURE — 25010000002 FUROSEMIDE PER 20 MG: Performed by: EMERGENCY MEDICINE

## 2018-11-30 PROCEDURE — 80053 COMPREHEN METABOLIC PANEL: CPT | Performed by: EMERGENCY MEDICINE

## 2018-11-30 RX ORDER — SODIUM CHLORIDE 0.9 % (FLUSH) 0.9 %
3 SYRINGE (ML) INJECTION EVERY 12 HOURS SCHEDULED
Status: DISCONTINUED | OUTPATIENT
Start: 2018-11-30 | End: 2018-12-05 | Stop reason: HOSPADM

## 2018-11-30 RX ORDER — DEXTROSE MONOHYDRATE 25 G/50ML
25 INJECTION, SOLUTION INTRAVENOUS
Status: DISCONTINUED | OUTPATIENT
Start: 2018-11-30 | End: 2018-12-05 | Stop reason: HOSPADM

## 2018-11-30 RX ORDER — FUROSEMIDE 10 MG/ML
40 INJECTION INTRAMUSCULAR; INTRAVENOUS ONCE
Status: COMPLETED | OUTPATIENT
Start: 2018-11-30 | End: 2018-11-30

## 2018-11-30 RX ORDER — SODIUM CHLORIDE 0.9 % (FLUSH) 0.9 %
1-10 SYRINGE (ML) INJECTION AS NEEDED
Status: DISCONTINUED | OUTPATIENT
Start: 2018-11-30 | End: 2018-12-05 | Stop reason: HOSPADM

## 2018-11-30 RX ORDER — ACETAMINOPHEN 325 MG/1
650 TABLET ORAL EVERY 4 HOURS PRN
Status: DISCONTINUED | OUTPATIENT
Start: 2018-11-30 | End: 2018-12-05 | Stop reason: HOSPADM

## 2018-11-30 RX ORDER — NICOTINE POLACRILEX 4 MG
15 LOZENGE BUCCAL
Status: DISCONTINUED | OUTPATIENT
Start: 2018-11-30 | End: 2018-12-05 | Stop reason: HOSPADM

## 2018-11-30 RX ADMIN — FUROSEMIDE 40 MG: 10 INJECTION, SOLUTION INTRAMUSCULAR; INTRAVENOUS at 23:41

## 2018-12-01 ENCOUNTER — APPOINTMENT (OUTPATIENT)
Dept: ULTRASOUND IMAGING | Facility: HOSPITAL | Age: 63
End: 2018-12-01

## 2018-12-01 PROBLEM — J90 PLEURAL EFFUSION: Status: ACTIVE | Noted: 2018-12-01

## 2018-12-01 PROBLEM — I50.43 ACUTE ON CHRONIC COMBINED SYSTOLIC AND DIASTOLIC CONGESTIVE HEART FAILURE (HCC): Status: ACTIVE | Noted: 2018-09-21

## 2018-12-01 LAB
ANION GAP SERPL CALCULATED.3IONS-SCNC: 11.9 MMOL/L
APPEARANCE FLD: ABNORMAL
B PERT DNA SPEC QL NAA+PROBE: NOT DETECTED
BACTERIA UR QL AUTO: NORMAL /HPF
BILIRUB UR QL STRIP: NEGATIVE
BUN BLD-MCNC: 24 MG/DL (ref 8–23)
BUN/CREAT SERPL: 14.6 (ref 7–25)
C PNEUM DNA NPH QL NAA+NON-PROBE: NOT DETECTED
CALCIUM SPEC-SCNC: 8.8 MG/DL (ref 8.6–10.5)
CHLORIDE SERPL-SCNC: 106 MMOL/L (ref 98–107)
CLARITY UR: CLEAR
CO2 SERPL-SCNC: 23.1 MMOL/L (ref 22–29)
COLOR FLD: YELLOW
COLOR UR: YELLOW
CREAT BLD-MCNC: 1.64 MG/DL (ref 0.76–1.27)
DEPRECATED RDW RBC AUTO: 41.6 FL (ref 37–54)
ERYTHROCYTE [DISTWIDTH] IN BLOOD BY AUTOMATED COUNT: 13.7 % (ref 11.5–14.5)
FLUAV H1 2009 PAND RNA NPH QL NAA+PROBE: NOT DETECTED
FLUAV H1 HA GENE NPH QL NAA+PROBE: NOT DETECTED
FLUAV H3 RNA NPH QL NAA+PROBE: NOT DETECTED
FLUAV SUBTYP SPEC NAA+PROBE: NOT DETECTED
FLUBV RNA ISLT QL NAA+PROBE: NOT DETECTED
GFR SERPL CREATININE-BSD FRML MDRD: 43 ML/MIN/1.73
GLUCOSE BLD-MCNC: 100 MG/DL (ref 65–99)
GLUCOSE BLDC GLUCOMTR-MCNC: 102 MG/DL (ref 70–130)
GLUCOSE BLDC GLUCOMTR-MCNC: 130 MG/DL (ref 70–130)
GLUCOSE BLDC GLUCOMTR-MCNC: 178 MG/DL (ref 70–130)
GLUCOSE BLDC GLUCOMTR-MCNC: 230 MG/DL (ref 70–130)
GLUCOSE BLDC GLUCOMTR-MCNC: 238 MG/DL (ref 70–130)
GLUCOSE FLD-MCNC: 186 MG/DL
GLUCOSE UR STRIP-MCNC: NEGATIVE MG/DL
HADV DNA SPEC NAA+PROBE: NOT DETECTED
HCOV 229E RNA SPEC QL NAA+PROBE: NOT DETECTED
HCOV HKU1 RNA SPEC QL NAA+PROBE: NOT DETECTED
HCOV NL63 RNA SPEC QL NAA+PROBE: NOT DETECTED
HCOV OC43 RNA SPEC QL NAA+PROBE: NOT DETECTED
HCT VFR BLD AUTO: 32.5 % (ref 40.4–52.2)
HGB BLD-MCNC: 10 G/DL (ref 13.7–17.6)
HGB UR QL STRIP.AUTO: NEGATIVE
HMPV RNA NPH QL NAA+NON-PROBE: NOT DETECTED
HPIV1 RNA SPEC QL NAA+PROBE: NOT DETECTED
HPIV2 RNA SPEC QL NAA+PROBE: NOT DETECTED
HPIV3 RNA NPH QL NAA+PROBE: NOT DETECTED
HPIV4 P GENE NPH QL NAA+PROBE: NOT DETECTED
HYALINE CASTS UR QL AUTO: NORMAL /LPF
INR PPP: 1.14 (ref 0.9–1.1)
KETONES UR QL STRIP: NEGATIVE
LDH FLD-CCNC: 37 U/L
LDH SERPL-CCNC: 179 U/L (ref 135–225)
LEUKOCYTE ESTERASE UR QL STRIP.AUTO: NEGATIVE
LYMPHOCYTES NFR FLD MANUAL: 87 %
M PNEUMO IGG SER IA-ACNC: NOT DETECTED
MCH RBC QN AUTO: 25.3 PG (ref 27–32.7)
MCHC RBC AUTO-ENTMCNC: 30.8 G/DL (ref 32.6–36.4)
MCV RBC AUTO: 82.1 FL (ref 79.8–96.2)
MONOS+MACROS NFR FLD: 4 %
NEUTROPHILS NFR FLD MANUAL: 9 %
NITRITE UR QL STRIP: NEGATIVE
PH FLD: 7.57 [PH]
PH UR STRIP.AUTO: 5.5 [PH] (ref 5–8)
PLATELET # BLD AUTO: 211 10*3/MM3 (ref 140–500)
PMV BLD AUTO: 10.2 FL (ref 6–12)
POTASSIUM BLD-SCNC: 3.7 MMOL/L (ref 3.5–5.2)
PROCALCITONIN SERPL-MCNC: 0.03 NG/ML (ref 0.1–0.25)
PROT FLD-MCNC: 1.3 G/DL
PROT UR QL STRIP: ABNORMAL
PROTHROMBIN TIME: 14.4 SECONDS (ref 11.7–14.2)
RBC # BLD AUTO: 3.96 10*6/MM3 (ref 4.6–6)
RBC # FLD AUTO: 55 /MM3
RBC # UR: NORMAL /HPF
REF LAB TEST METHOD: NORMAL
RHINOVIRUS RNA SPEC NAA+PROBE: NOT DETECTED
RSV RNA NPH QL NAA+NON-PROBE: NOT DETECTED
SODIUM BLD-SCNC: 141 MMOL/L (ref 136–145)
SP GR UR STRIP: 1.01 (ref 1–1.03)
SQUAMOUS #/AREA URNS HPF: NORMAL /HPF
TROPONIN T SERPL-MCNC: 0.04 NG/ML (ref 0–0.03)
TROPONIN T SERPL-MCNC: 0.05 NG/ML (ref 0–0.03)
TSH SERPL DL<=0.05 MIU/L-ACNC: 5.34 MIU/ML (ref 0.27–4.2)
URATE SERPL-MCNC: 6.9 MG/DL (ref 3.4–7)
UROBILINOGEN UR QL STRIP: ABNORMAL
WBC # FLD: 244 /MM3
WBC NRBC COR # BLD: 6.21 10*3/MM3 (ref 4.5–10.7)
WBC UR QL AUTO: NORMAL /HPF

## 2018-12-01 PROCEDURE — 85027 COMPLETE CBC AUTOMATED: CPT | Performed by: NURSE PRACTITIONER

## 2018-12-01 PROCEDURE — 63710000001 INSULIN LISPRO (HUMAN) PER 5 UNITS: Performed by: NURSE PRACTITIONER

## 2018-12-01 PROCEDURE — 87070 CULTURE OTHR SPECIMN AEROBIC: CPT | Performed by: INTERNAL MEDICINE

## 2018-12-01 PROCEDURE — 84484 ASSAY OF TROPONIN QUANT: CPT | Performed by: NURSE PRACTITIONER

## 2018-12-01 PROCEDURE — 87075 CULTR BACTERIA EXCEPT BLOOD: CPT | Performed by: INTERNAL MEDICINE

## 2018-12-01 PROCEDURE — 82962 GLUCOSE BLOOD TEST: CPT

## 2018-12-01 PROCEDURE — 87633 RESP VIRUS 12-25 TARGETS: CPT | Performed by: INTERNAL MEDICINE

## 2018-12-01 PROCEDURE — 84550 ASSAY OF BLOOD/URIC ACID: CPT | Performed by: INTERNAL MEDICINE

## 2018-12-01 PROCEDURE — 81001 URINALYSIS AUTO W/SCOPE: CPT | Performed by: EMERGENCY MEDICINE

## 2018-12-01 PROCEDURE — 83615 LACTATE (LD) (LDH) ENZYME: CPT | Performed by: INTERNAL MEDICINE

## 2018-12-01 PROCEDURE — 80048 BASIC METABOLIC PNL TOTAL CA: CPT | Performed by: NURSE PRACTITIONER

## 2018-12-01 PROCEDURE — 87486 CHLMYD PNEUM DNA AMP PROBE: CPT | Performed by: INTERNAL MEDICINE

## 2018-12-01 PROCEDURE — 85610 PROTHROMBIN TIME: CPT | Performed by: INTERNAL MEDICINE

## 2018-12-01 PROCEDURE — 87581 M.PNEUMON DNA AMP PROBE: CPT | Performed by: INTERNAL MEDICINE

## 2018-12-01 PROCEDURE — 36415 COLL VENOUS BLD VENIPUNCTURE: CPT | Performed by: NURSE PRACTITIONER

## 2018-12-01 PROCEDURE — 84145 PROCALCITONIN (PCT): CPT | Performed by: INTERNAL MEDICINE

## 2018-12-01 PROCEDURE — 84157 ASSAY OF PROTEIN OTHER: CPT | Performed by: INTERNAL MEDICINE

## 2018-12-01 PROCEDURE — 99254 IP/OBS CNSLTJ NEW/EST MOD 60: CPT | Performed by: INTERNAL MEDICINE

## 2018-12-01 PROCEDURE — 89051 BODY FLUID CELL COUNT: CPT | Performed by: INTERNAL MEDICINE

## 2018-12-01 PROCEDURE — 88112 CYTOPATH CELL ENHANCE TECH: CPT | Performed by: INTERNAL MEDICINE

## 2018-12-01 PROCEDURE — 25010000002 FUROSEMIDE PER 20 MG: Performed by: INTERNAL MEDICINE

## 2018-12-01 PROCEDURE — 84443 ASSAY THYROID STIM HORMONE: CPT | Performed by: INTERNAL MEDICINE

## 2018-12-01 PROCEDURE — 87015 SPECIMEN INFECT AGNT CONCNTJ: CPT | Performed by: INTERNAL MEDICINE

## 2018-12-01 PROCEDURE — 87798 DETECT AGENT NOS DNA AMP: CPT | Performed by: INTERNAL MEDICINE

## 2018-12-01 PROCEDURE — 82945 GLUCOSE OTHER FLUID: CPT | Performed by: INTERNAL MEDICINE

## 2018-12-01 PROCEDURE — 99255 IP/OBS CONSLTJ NEW/EST HI 80: CPT | Performed by: PSYCHIATRY & NEUROLOGY

## 2018-12-01 PROCEDURE — 87205 SMEAR GRAM STAIN: CPT | Performed by: INTERNAL MEDICINE

## 2018-12-01 PROCEDURE — 0W993ZZ DRAINAGE OF RIGHT PLEURAL CAVITY, PERCUTANEOUS APPROACH: ICD-10-PCS | Performed by: RADIOLOGY

## 2018-12-01 PROCEDURE — 83986 ASSAY PH BODY FLUID NOS: CPT | Performed by: INTERNAL MEDICINE

## 2018-12-01 PROCEDURE — 76942 ECHO GUIDE FOR BIOPSY: CPT

## 2018-12-01 RX ORDER — METOPROLOL TARTRATE 100 MG/1
100 TABLET ORAL 2 TIMES DAILY
Status: DISCONTINUED | OUTPATIENT
Start: 2018-12-01 | End: 2018-12-03

## 2018-12-01 RX ORDER — CLOPIDOGREL BISULFATE 75 MG/1
75 TABLET ORAL DAILY
Status: DISCONTINUED | OUTPATIENT
Start: 2018-12-01 | End: 2018-12-01

## 2018-12-01 RX ORDER — FUROSEMIDE 10 MG/ML
40 INJECTION INTRAMUSCULAR; INTRAVENOUS EVERY 12 HOURS
Status: COMPLETED | OUTPATIENT
Start: 2018-12-01 | End: 2018-12-02

## 2018-12-01 RX ORDER — ATORVASTATIN CALCIUM 20 MG/1
40 TABLET, FILM COATED ORAL NIGHTLY
Status: DISCONTINUED | OUTPATIENT
Start: 2018-12-01 | End: 2018-12-05 | Stop reason: HOSPADM

## 2018-12-01 RX ORDER — ASPIRIN 81 MG/1
81 TABLET ORAL DAILY
Status: DISCONTINUED | OUTPATIENT
Start: 2018-12-01 | End: 2018-12-05 | Stop reason: HOSPADM

## 2018-12-01 RX ORDER — CLOPIDOGREL BISULFATE 75 MG/1
75 TABLET ORAL DAILY
Status: DISCONTINUED | OUTPATIENT
Start: 2018-12-02 | End: 2018-12-05 | Stop reason: HOSPADM

## 2018-12-01 RX ORDER — LEVOTHYROXINE SODIUM 0.05 MG/1
50 TABLET ORAL DAILY
Status: DISCONTINUED | OUTPATIENT
Start: 2018-12-01 | End: 2018-12-02

## 2018-12-01 RX ORDER — BUPROPION HYDROCHLORIDE 150 MG/1
150 TABLET ORAL DAILY
Status: DISCONTINUED | OUTPATIENT
Start: 2018-12-01 | End: 2018-12-05 | Stop reason: HOSPADM

## 2018-12-01 RX ORDER — LIDOCAINE HYDROCHLORIDE 10 MG/ML
20 INJECTION, SOLUTION INFILTRATION; PERINEURAL ONCE
Status: COMPLETED | OUTPATIENT
Start: 2018-12-01 | End: 2018-12-01

## 2018-12-01 RX ADMIN — INSULIN LISPRO 4 UNITS: 100 INJECTION, SOLUTION INTRAVENOUS; SUBCUTANEOUS at 12:09

## 2018-12-01 RX ADMIN — FUROSEMIDE 40 MG: 10 INJECTION, SOLUTION INTRAMUSCULAR; INTRAVENOUS at 09:45

## 2018-12-01 RX ADMIN — LEVOTHYROXINE SODIUM 50 MCG: 50 TABLET ORAL at 08:40

## 2018-12-01 RX ADMIN — BUPROPION HYDROCHLORIDE 150 MG: 150 TABLET, FILM COATED, EXTENDED RELEASE ORAL at 08:40

## 2018-12-01 RX ADMIN — INSULIN LISPRO 4 UNITS: 100 INJECTION, SOLUTION INTRAVENOUS; SUBCUTANEOUS at 21:34

## 2018-12-01 RX ADMIN — ATORVASTATIN CALCIUM 40 MG: 20 TABLET, FILM COATED ORAL at 02:10

## 2018-12-01 RX ADMIN — SODIUM CHLORIDE, PRESERVATIVE FREE 3 ML: 5 INJECTION INTRAVENOUS at 01:35

## 2018-12-01 RX ADMIN — ASPIRIN 81 MG: 81 TABLET, DELAYED RELEASE ORAL at 08:40

## 2018-12-01 RX ADMIN — SODIUM CHLORIDE, PRESERVATIVE FREE 3 ML: 5 INJECTION INTRAVENOUS at 20:01

## 2018-12-01 RX ADMIN — LIDOCAINE HYDROCHLORIDE 20 ML: 10 INJECTION, SOLUTION INFILTRATION; PERINEURAL at 14:23

## 2018-12-01 RX ADMIN — INSULIN LISPRO 3 UNITS: 100 INJECTION, SOLUTION INTRAVENOUS; SUBCUTANEOUS at 01:36

## 2018-12-01 RX ADMIN — ACETAMINOPHEN 650 MG: 325 TABLET, FILM COATED ORAL at 16:06

## 2018-12-01 RX ADMIN — CLOPIDOGREL 75 MG: 75 TABLET, FILM COATED ORAL at 08:40

## 2018-12-01 RX ADMIN — METOPROLOL TARTRATE 100 MG: 100 TABLET, FILM COATED ORAL at 20:00

## 2018-12-01 RX ADMIN — ATORVASTATIN CALCIUM 40 MG: 20 TABLET, FILM COATED ORAL at 20:00

## 2018-12-01 RX ADMIN — FUROSEMIDE 40 MG: 10 INJECTION, SOLUTION INTRAMUSCULAR; INTRAVENOUS at 21:34

## 2018-12-01 RX ADMIN — METOPROLOL TARTRATE 100 MG: 100 TABLET, FILM COATED ORAL at 08:40

## 2018-12-01 NOTE — ED NOTES
Pt has been notified of urine specimen.   Pt stated he urinated right before coming in      [facility]  11/30/18 2032

## 2018-12-01 NOTE — CONSULTS
Date of Consultation: 18    Referral Provider: Asim Hogue MD     Reason for Consultation: Shortness of breath, CHF    Encounter Provider: Mauricio Corona MD    Group of Service: [facility]     Patient Name: Carlito Mo    :1955    Chief complaint:  Shortness of breath    History of Present Illness:    Mr. Mo is a 63 year old man who follows with Dr. Murguia and has history coronary artery disease s/p CABG , hypertension, hyperlipidemia, type 2 diabetes, and chronic kidney disease. He was initially seen in consultation in 2017 when admitted for multiple, small CVAs. His echocardiogram showed LVH, mild left atrial enlargement, and EF 68%. He had a NEISHA which showed EF 45% with focal wall abnormalities in the septum, anterior wall, and apex. A small PFO was also noted. He was readmitted in 2017 with AMS. An echocardiogram showed EF 63% with grade I LV dysfunction. A Zio patch showed PACs, PVCs, and NSVT. He was again hospitalized in May 2018 with AMS and /109. He had an elevated troponin which was noted to be chronically elevated; no EKG changes noted. He had colonoscopy in 2018 and became hypoxic following procedure. He was admitted and had echocardiogram which showed EF 46%, grade II diastolic dysfunction, RVSP 35-45mmHg and left pleural effusion. He was last seen in office on 18; no changes were warranted at that time.     He presented to ED on 18 with intermittent dyspnea with exertion. He had a CT chest on 18 at a [facility] which showed bilateral pleural effusions and pulmonary edema. He was referred to ED. Upon arrival, /83 with HR 62; ekg showed sinus bradycardia. Labs: BUN 26, Crea 1.82 proBNP 2550 and troponin 0.047, 0.042, and 0.048. He was treated with IV lasix and admitted for further evaluation.  He does state that he has been short of breath the last several weeks.  This is mainly with  activity, and has been worsening.  He has not had any true chest pain, although he has felt pressure when he is short of breath.  He also seems to be confused at times, although he has had this in the past as well.    Telemetry:  Echocardiogram 9/20/18  Interpretation Summary     · Left ventricular systolic function is mildly decreased. Calculated EF = 46%. Estimated EF was in agreement with the calculated EF. Normal left ventricular cavity size noted. Global left ventricular wall motion appears abnormal. Left ventricular wall thickness is consistent with mild concentric hypertrophy. Left ventricular diastolic dysfunction is noted (grade II w/high LAP) consistent with pseudonormalization. There is no evidence of a left ventricular thrombus present.  · See wall scoring digram.  · Estimated right ventricular systolic pressure from tricuspid regurgitation is mildly elevated (35-45 mmHg). Calculated right ventricular systolic pressure from tricuspid regurgitation is 41 mmHg.  · There is a left pleural effusion.     Echocardiogram 2/17/18  Interpretation Summary     · Calculated EF = 62.9%  · Left ventricular systolic function is normal.  · Left ventricular diastolic dysfunction (grade I) consistent with impaired relaxation.  · Mild mitral valve regurgitation is present  · Saline test results are negative.     NEISHA 12/5/17  Interpretation Summary     · Left ventricular systolic function is mildly decreased. Calculated EF = 45%. Normal left ventricular cavity size and wall thickness noted.  · The following segments are akinetic: mid anteroseptal, mid inferoseptal, apical anterior, apical septal and apex.  · Left atrial cavity size is moderately dilated.  · Doppler interrogation shows mildly reduced flow within the left atrial appendage.  · Small patent foramen ovale present.  · The mitral valve is abnormal in structure. There is moderate bileaflet thickening present.  · Mild mitral valve regurgitation is present.      Echocardiogram 12/3/17  Interpretation Summary     · Peak velocity of the flow distal to the aortic valve is 153 cm/s.  · Aortic valve maximum pressure gradient is 9 mmHg.  · Aortic valve dimensionless index is 0.6.  · The mitral valve peak gradient is 6 mmHg.  · Left ventricular wall thickness is consistent with mild concentric hypertrophy.  · Left atrial cavity size is mildly dilated.  · SALINE BUBBLE STUDY NEG FOR PFO  · Left Ventricle: Calculated EF = 68.5%.  · There is no evidence of pericardial effusion       Past Medical History:   Diagnosis Date   • Acquired hypothyroidism    • Acute congestive heart failure (CMS/HCC)    • Acute respiratory failure with hypoxia (CMS/HCC)    • Acute sinusitis    • SYLVAIN (acute kidney injury) (CMS/HCC)     on CKD   • Anemia    • Arthritis    • Cancer (CMS/HCC)     skin   • Chronic ischemic heart disease    • CKD (chronic kidney disease)    • Depression    • Diabetes mellitus type 2, uncontrolled, without complications (CMS/HCC)    • Disease of thyroid gland    • Fatigue    • Heart attack (CMS/HCC)    • History of coronary artery disease     with remote history of bypass surgery in 2001   • History of transfusion    • Hyperglycemia    • Hyperlipidemia    • Hypertension    • Hypertensive encephalopathy    • Mental status change     Acute   • Pneumonia    • RBBB (right bundle branch block)    • Screening for prostate cancer 2011   • Stroke (CMS/HCC)    • TIA (transient ischemic attack)    • Type 2 diabetes mellitus (CMS/HCC)    • Urinary retention    • Vitamin D deficiency          Past Surgical History:   Procedure Laterality Date   • APPENDECTOMY N/A 02/14/2016    Dr. Alexey Dodson   • COLONOSCOPY      over 20 years ago.  no polyps    • CORONARY ARTERY BYPASS GRAFT  11/2001   • TONSILLECTOMY     • VASECTOMY           Allergies   Allergen Reactions   • Fluoxetine Other (See Comments)   • Prozac [Fluoxetine Hcl] Other (See Comments)     shaking         No current  "facility-administered medications on file prior to encounter.      Current Outpatient Medications on File Prior to Encounter   Medication Sig Dispense Refill   • amLODIPine (NORVASC) 5 MG tablet Take 1 tablet by mouth Daily for 180 days. 90 tablet 1   • aspirin 81 MG EC tablet Take 1 tablet by mouth Daily for 180 days. 90 tablet 1   • atorvastatin (LIPITOR) 40 MG tablet Take 1 tablet by mouth Every Night for 180 days. 90 tablet 1   • buPROPion XL (WELLBUTRIN XL) 150 MG 24 hr tablet Take 1 tablet by mouth Daily for 180 days. 90 tablet 1   • clopidogrel (PLAVIX) 75 MG tablet Take 1 tablet by mouth Daily for 180 days. 90 tablet 1   • docusate sodium (COLACE) 100 MG capsule Take 1 capsule by mouth 2 (two) times a day. (Patient taking differently: Take 100 mg by mouth As Needed for Constipation.) 30 capsule 0   • insulin glulisine (APIDRA) 100 UNIT/ML injection Inject  under the skin into the appropriate area as directed 3 (Three) Times a Day Before Meals. Sliding scale    Pt states \"I rarely take it, maybe once a week. I just don't like sticking my self.\"     • Insulin Pen Needle (B-D ULTRAFINE III SHORT PEN) 31G X 8 MM misc Use to inject 4x daily 150 each 5   • levothyroxine (SYNTHROID) 50 MCG tablet Take 1 tablet by mouth Daily. 30 tablet 5   • metoprolol tartrate (LOPRESSOR) 100 MG tablet Take 1 tablet by mouth 2 (Two) Times a Day for 180 days. 180 tablet 1   • RELION GLUCOSE TEST STRIPS test strip 1 each by Other route 2 (two) times a day. Use as instructed     • vitamin D (ERGOCALCIFEROL) 68883 units capsule capsule Take 1 cap 3 times weekly 24 capsule 1         Social History     Socioeconomic History   • Marital status:      Spouse name: Not on file   • Number of children: Not on file   • Years of education: Not on file   • Highest education level: Not on file   Social Needs   • Financial resource strain: Not on file   • Food insecurity - worry: Not on file   • Food insecurity - inability: Not on file   • " "Transportation needs - medical: Not on file   • Transportation needs - non-medical: Not on file   Occupational History   • Not on file   Tobacco Use   • Smoking status: Never Smoker   • Smokeless tobacco: Never Used   • Tobacco comment: daily caffiene   Substance and Sexual Activity   • Alcohol use: No   • Drug use: No   • Sexual activity: Defer   Other Topics Concern   • Not on file   Social History Narrative   • Not on file         Family History   Problem Relation Age of Onset   • Diabetes Mother    • Hypertension Mother    • Macular degeneration Mother    • Alcohol abuse Father    • Cancer Father         lung   • Alcohol abuse Brother        REVIEW OF SYSTEMS:   Pertinent positives were noted in the history of present illness above.  Otherwise, all other systems were reviewed, and are negative.     Objective:     Vitals:    12/01/18 1400 12/01/18 1440 12/01/18 1933 12/01/18 2000   BP: 136/77 135/75 133/74    BP Location: Left arm Left arm Left arm    Patient Position: Lying Lying Lying    Pulse: 57 55 54 58   Resp: 18  18    Temp:   97.8 °F (36.6 °C)    TempSrc:   Oral    SpO2: 98% 100%     Weight:       Height:         Body mass index is 24.43 kg/m².  Flowsheet Rows      First Filed Value   Admission Height  182.9 cm (72\") Documented at 11/30/2018 1904   Admission Weight  87.2 kg (192 lb 3.2 oz) Documented at 11/30/2018 2000           General:    No acute distress, alert and oriented x4, pleasant                   Head:    Normocephalic, atraumatic.   Eyes:          Conjunctivae and sclerae normal, no icterus, PERRLA   Throat:   No oral lesions, no thrush, oral mucosa moist.    Neck:   Supple, trachea midline.   Lungs:     Decreased breath sounds right base, bilateral rales at bases    Heart:    Regular rhythm and normal rate.  II/VI SM throughout.   Abdomen:     Soft, non-tender, non-distended, positive bowel sounds.    Extremities:   1-2+ edema.     Pulses:   Pulses palpable and equal bilaterally.    Skin:   " No bleeding or rash.   Neuro:   Non-focal.  Moves all extremities well.    Psychiatric:   Mildly blunted affect.     Lab Review:                Results from last 7 days   Lab Units  12/01/18   0548   SODIUM mmol/L  141   POTASSIUM mmol/L  3.7   CHLORIDE mmol/L  106   CO2 mmol/L  23.1   BUN mg/dL  24*   CREATININE mg/dL  1.64*   GLUCOSE mg/dL  100*   CALCIUM mg/dL  8.8     Results from last 7 days   Lab Units  12/01/18   0548  12/01/18   0202  11/30/18 2008   TROPONIN T ng/mL  0.048*  0.042*  0.047*     Results from last 7 days   Lab Units  12/01/18   0548   WBC 10*3/mm3  6.21   HEMOGLOBIN g/dL  10.0*   HEMATOCRIT %  32.5*   PLATELETS 10*3/mm3  211     Results from last 7 days   Lab Units  12/01/18   0945   INR   1.14*                  Telemetry:      EKG (reviewed by me personally):          Assessment:   1. Acute on chronic combined CHF (last EF about 45%)  2. Bilateral pleural effusions  3. Intermittent confusion and altered mental status  4. CAD status post CABG in 2001  5. Multiple small CVAs 11/2017  6. Acute kidney injury with CKD  7. Chronically elevated troponin   8. Mediastinal lymphadenopathy of CT scan   9. Diabetes   10. Chronic anemia     Plan:       The patient currently has multiple issues.  However, he does have clear evidence of worsening congestive heart failure.  He had pulmonary edema and significant bilateral pleural effusions on his outpatient CT scan of the chest prior to admission.  He is renal function is slightly worse than his baseline, although it improved overnight after diuresis.  I agree with continuing him on Lasix at 40 mg every 12 hours for 3 more doses.  He is on metoprolol 100 mg twice a day, although pending his echocardiogram, this may need to be changed to one of the beta blockers approved for congestive heart failure.  He is not on an ACE inhibitor secondary to his renal function.  An echocardiogram has been ordered and is pending.  I will defer to pulmonology as to whether  the pleural effusions need to be drained.  He will continue on aspirin and Plavix, as well as Lipitor for his history of coronary artery disease.  I will also check thyroid function tests in the morning to ensure that hypothyroidism is not contributing to his congestive heart failure.  If his ejection fraction has dropped significantly, he may need an eventual cardiac catheterization.  The echocardiogram will determine several next steps.  Neurology is following him for his altered mental status.    Thank you very much this consult.    Jonatan Corona M.D.

## 2018-12-01 NOTE — PLAN OF CARE
Problem: Patient Care Overview  Goal: Plan of Care Review  Outcome: Ongoing (interventions implemented as appropriate)   12/01/18 9428   Coping/Psychosocial   Plan of Care Reviewed With patient   Plan of Care Review   Progress improving   OTHER   Outcome Summary VSS. Medicated for pain x1. Thoracentesis- 2L off. Dressing to R side of back C/D/I. Respiratory panel negative. Pulmonology consulted. MRI brain, EEG, and echo scheduled. Up with stand-by assist. Bed alarm in place. Continue to monitor.

## 2018-12-01 NOTE — CONSULTS
"[facility]    Reason for consult: lad, pleural effusion, possible pna    HPI:  Mr. Mo is a 64yo WM with a history of chronic systolic chf.  He presented to the Emergency room last night with a history of intermittent shortness of breath for about a week now.  It is worse with exertion and better with rest. Symptoms moderate.   A CT chest was ordered and done at a [facility] facility, I can see the report in Care everywhere desrcribing bilateral effusions and infiltrates, this was done on 11/28/2018.  He had some \"mild mediastinal lad\" as well with largest measured value listed being 1.2cm.  CXR here actually looks improved from the last one in our system and the left lung look pretty clear to me.    Past Medical History:   Diagnosis Date   • Acquired hypothyroidism    • Acute congestive heart failure (CMS/HCC)    • Acute respiratory failure with hypoxia (CMS/HCC)    • Acute sinusitis    • SYLVAIN (acute kidney injury) (CMS/HCC)     on CKD   • Anemia    • Arthritis    • Cancer (CMS/HCC)     skin   • Chronic ischemic heart disease    • CKD (chronic kidney disease)    • Depression    • Diabetes mellitus type 2, uncontrolled, without complications (CMS/HCC)    • Disease of thyroid gland    • Fatigue    • Heart attack (CMS/HCC)    • History of coronary artery disease     with remote history of bypass surgery in 2001   • History of transfusion    • Hyperglycemia    • Hyperlipidemia    • Hypertension    • Hypertensive encephalopathy    • Mental status change     Acute   • Pneumonia    • RBBB (right bundle branch block)    • Screening for prostate cancer 2011   • Stroke (CMS/HCC)    • TIA (transient ischemic attack)    • Type 2 diabetes mellitus (CMS/HCC)    • Urinary retention    • Vitamin D deficiency      Social History     Socioeconomic History   • Marital status:      Spouse name: Not on file   • Number of children: Not on file   • Years of education: Not on file   • Highest education level: Not on file "   Tobacco Use   • Smoking status: Never Smoker   • Smokeless tobacco: Never Used   • Tobacco comment: daily caffiene   Substance and Sexual Activity   • Alcohol use: No   • Drug use: No   • Sexual activity: Defer       MEDS: reviewed   ALL: prozac  ROS:  Unable to perform ROS: Other (pt is a vague historian)   Constitutional: Negative for fever.   Respiratory: Positive for shortness of breath. Negative for cough.    Cardiovascular: Positive for chest pain and leg swelling (BLE edema).   Gastrointestinal: Negative for abdominal pain, nausea and vomiting.   Psychiatric/Behavioral: Positive for confusion.     Vital Sign Min/Max for last 24 hours  Temp  Min: 97.5 °F (36.4 °C)  Max: 98.1 °F (36.7 °C)   BP  Min: 134/71  Max: 155/95   Pulse  Min: 57  Max: 65   Resp  Min: 16  Max: 20   SpO2  Min: 93 %  Max: 97 %   No Data Recorded   Weight  Min: 81.1 kg (178 lb 12.8 oz)  Max: 87.2 kg (192 lb 3.2 oz)     GEN:   appears ill, alert, a little confused  HEENT: PERRL, EOMI, no icterus, mmm, no jvd, trachea midline, neck supple  CHEST: decreased right base, no wheezes, + crackles bibasilar, no use of accessory muscles  CV: RRR, no m/g/r  ABD: soft, nt, nd +bs, no hepatosplenomegaly  EXT: no c/c/ 1+edema  SKIN: no rashes, no xanthomas, nl turgor  LYMPH: no palpable cervical or supraclavicular lymphadenopathy  NEURO: CN 2-12 intact and symmetric bilaterally  PSYCH: nl affect, nl orientation, nl judgement, nl mood  MSK: no kyphoscoliosis, 5/5 strength  ue and le bilaterally    Labs:  Wbc 6.9 (nl diff)  hgb 10   plts 211  Cr 1.64 (1.82)  Bicarb 23  Trop 0.048  probnp 2555    CXR: reviewed, agree with dictated report  CT chest: reviewed images in our system from prior; reviewed report in [employer] system    A/P:  1. Acute on chronic systolic chf -- agree with diuresis  2. Pleural effusions -- actually appears improved on cxr compared with prior imaging, but is substantial and persistent, may very well be contributing to DE LEON, can do  diagnostic and therapeutic thoracentesis.  3. Mediastinal lymphadenopathy -- mild, likely reactive, would recommend repeat ct chest in 4 months time  4. CKD  5. Anemia  6. Elevated troponin -- suggest cards to see  7. H/o CVA    Discussed with Dr. deleon and Dr. dunham

## 2018-12-01 NOTE — CONSULTS
"CC:AMS    HPI:  Carlito Mo is a  63 y.o.  left-handed white male who we are asked to see in neurologic consultation by Ashley Campbell NP with altered mental status.  The patient was admitted yesterday with an approximate two-week history of feeling as though he was \"in a fog is \"as well as some recent falls.  Patient was having shortness of breath or chest pain and was found to have a large right pleural effusion/infiltrate lower lobe and pulmonary edema on chest CT ordered by his primary physician and he was sent for admission through the emergency room.  A head CT was obtained which shows some chronic white matter changes and some encephalomalacia/arachnoid cyst involving the left cerebellar hemisphere.  No acute changes are seen.  There were no changes compared to a previous head CT 9/20/18.    Patient describes some balance trouble and some postural lightheadedness.  He describes numbness tingling and burning in his feet recently also.  He has a 10-15 year history of diabetes.  He also has hyperlipidemia, hypertension, hypothyroidism, chronic kidney disease, coronary artery disease status post MI 2011.    Laboratory evaluation showed troponin levels were elevated but his creatinine is also elevated at 1.64.  His CBC was normal.  Urinalysis was negative.  Blood sugar 100.  He describes some additional symptoms such as chills but no fevers and no cough at all.  He describes a minor dull headache.    Background symptoms include depression which she states is not any better as well as daytime drowsiness and knowledge of nocturnal snoring.  He states he has been told at least a couple times of the suspicion he has sleep apnea.    He denies history of significant head trauma meningitis seizures or syncope.  He states that he had a stroke in January of this year and that it caused memory trouble.  He denies that it caused any one-sided symptoms such as weakness numbness tingling visual change or speech or language " disturbance.  He states he has been having some trouble with his vision which is progressively getting worse but he has not yet seen his eye doctor regarding this.  He also indicates that he does not smoke or drink alcoholic beverages.     Patient indicates his wife is currently in rehabilitation for a leg fracture.        Past Medical History:   Diagnosis Date   • Acquired hypothyroidism    • Acute congestive heart failure (CMS/HCC)    • Acute respiratory failure with hypoxia (CMS/HCC)    • Acute sinusitis    • SYLVAIN (acute kidney injury) (CMS/[facility])     on CKD   • Anemia    • Arthritis    • Cancer (CMS/HCC)     skin   • Chronic ischemic heart disease    • CKD (chronic kidney disease)    • Depression    • Diabetes mellitus type 2, uncontrolled, without complications (CMS/HCC)    • Disease of thyroid gland    • Fatigue    • Heart attack (CMS/HCC)    • History of coronary artery disease     with remote history of bypass surgery in 2001   • History of transfusion    • Hyperglycemia    • Hyperlipidemia    • Hypertension    • Hypertensive encephalopathy    • Mental status change     Acute   • Pneumonia    • RBBB (right bundle branch block)    • Screening for prostate cancer 2011   • Stroke (CMS/[facility])    • TIA (transient ischemic attack)    • Type 2 diabetes mellitus (CMS/HCC)    • Urinary retention    • Vitamin D deficiency          Past Surgical History:   Procedure Laterality Date   • APPENDECTOMY N/A 02/14/2016    Dr. Alexey Dodson   • COLONOSCOPY      over 20 years ago.  no polyps    • CORONARY ARTERY BYPASS GRAFT  11/2001   • TONSILLECTOMY     • VASECTOMY             Current Facility-Administered Medications:   •  acetaminophen (TYLENOL) tablet 650 mg, 650 mg, Oral, Q4H PRN, Ashley Campbell E, APRN  •  aspirin EC tablet 81 mg, 81 mg, Oral, Daily, Ashley Campbell, APRN, 81 mg at 12/01/18 0840  •  atorvastatin (LIPITOR) tablet 40 mg, 40 mg, Oral, Nightly, Ashley Campbell E, APRN, 40 mg at 12/01/18 0210  •  buPROPion XL  (WELLBUTRIN XL) 24 hr tablet 150 mg, 150 mg, Oral, Daily, Ashley Campbell, APRN, 150 mg at 12/01/18 0840  •  [START ON 12/2/2018] clopidogrel (PLAVIX) tablet 75 mg, 75 mg, Oral, Daily, Jean-Paul García MD  •  dextrose (D50W) 25 g/ 50mL Intravenous Solution 25 g, 25 g, Intravenous, Q15 Min PRN, Ashley Campbell, APRN  •  dextrose (GLUTOSE) oral gel 15 g, 15 g, Oral, Q15 Min PRN, Ashley Campbell E, APRN  •  furosemide (LASIX) injection 40 mg, 40 mg, Intravenous, Q12H, Alexey Haddad MD  •  glucagon (human recombinant) (GLUCAGEN DIAGNOSTIC) injection 1 mg, 1 mg, Subcutaneous, PRN, Ashley Campbell E, APRN  •  Hold medication, 1 each, Does not apply, Continuous PRN, Alexey Haddad MD  •  insulin lispro (humaLOG) injection 0-9 Units, 0-9 Units, Subcutaneous, 4x Daily With Meals & Nightly, Ashley Campbell, APRN, 3 Units at 12/01/18 0136  •  levothyroxine (SYNTHROID, LEVOTHROID) tablet 50 mcg, 50 mcg, Oral, Daily, Ashley Campbell, APRN, 50 mcg at 12/01/18 0840  •  metoprolol tartrate (LOPRESSOR) tablet 100 mg, 100 mg, Oral, BID, Ashley Campbell, APRN, 100 mg at 12/01/18 0840  •  sodium chloride 0.9 % flush 1-10 mL, 1-10 mL, Intravenous, PRN, Ashley Campbell E, APRN  •  sodium chloride 0.9 % flush 3 mL, 3 mL, Intravenous, Q12H, Ashley Campbell, APRN, 3 mL at 12/01/18 0135      Family History   Problem Relation Age of Onset   • Diabetes Mother    • Hypertension Mother    • Macular degeneration Mother    • Alcohol abuse Father    • Cancer Father         lung   • Alcohol abuse Brother          Social History     Socioeconomic History   • Marital status:      Spouse name: Not on file   • Number of children: Not on file   • Years of education: Not on file   • Highest education level: Not on file   Social Needs   • Financial resource strain: Not on file   • Food insecurity - worry: Not on file   • Food insecurity - inability: Not on file   • Transportation needs - medical: Not on file   • Transportation needs -  non-medical: Not on file   Occupational History   • Not on file   Tobacco Use   • Smoking status: Never Smoker   • Smokeless tobacco: Never Used   • Tobacco comment: daily caffiene   Substance and Sexual Activity   • Alcohol use: No   • Drug use: No   • Sexual activity: Defer   Other Topics Concern   • Not on file   Social History Narrative   • Not on file         Allergies   Allergen Reactions   • Fluoxetine Other (See Comments)   • Prozac [Fluoxetine Hcl] Other (See Comments)     shaking         Pain Scale: 1/10        ROS:  Review of Systems   Constitutional: Negative for activity change, appetite change .  Positive for fatigue.   HENT: Negative for rhinorrhea and trouble swallowing. Negative for ear pain, facial swelling and hearing loss.    Eyes: Positive for visual disturbance. Negative for pain, redness and itching.   Respiratory: Negative for cough, choking.  Positive for shortness of breath.    Cardiovascular: positive for chest pain and leg swelling.   Gastrointestinal: Negative for abdominal pain, nausea and vomiting.   Endocrine: Negative for cold intolerance and heat intolerance.   Musculoskeletal: Positive for gait problem.   Skin: Positive for bruise right lateral thigh for falling on his wife's wheelchair.  Out of car.    Allergic/Immunologic: Negative for environmental allergies. Negative for food allergies.   Neurological: Negative for tremors.  Positive for numbness in the feet.  Positive for orthostatic dizziness negative for seizures, syncope, facial asymmetry, speech difficulty,   Hematological: Negative for adenopathy. Does not bruise/bleed easily.   Psychiatric/Behavioral: Negative for agitation, behavioral problems.  Positive for confusion, decreased concentration, dysphoric mood, positive for sleep disturbance . The patient is not nervous/anxious and is not hyperactive.            Physical Exam:  Vitals:    12/01/18 0210 12/01/18 0410 12/01/18 0546 12/01/18 0733   BP:  134/71  150/75   BP  Location:  Left arm  Left arm   Patient Position:  Lying  Lying   Pulse: 58 57  62   Resp:  18  16   Temp:  97.5 °F (36.4 °C)  97.5 °F (36.4 °C)   TempSrc:  Oral  Oral   SpO2:  95%  95%   Weight:   81.7 kg (180 lb 1.9 oz)    Height:         Supine blood pressure 160/90; standing blood pressure 140/70 and delayed 3 minutes 144/70.  This was in the left arm.    Body mass index is 24.43 kg/m².    Physical Exam  Gen.: Well-developed white male no acute distress  HEENT: Normocephalic no evidence of trauma.  Discs are poorly seen due to cataracts.  Throat negative  Neck: Supple.  No thyromegaly.  No cervical bruits.  Radial pulses were strong and simultaneous.  Heart: Regular rate and rhythm with a grade 1 to 2/6 systolic murmur.  Pedal edema present more in the left leg and (chronic after vein stripping for his bypass surgery)      Neurological Exam:   Mental status: Patient is awake alert oriented conversant provides a good history with evidence of depressed affect.  There is frequently a slight delay in his responses.  HCF: No aphasia or apraxia or dysarthria.  Recent and remote memory intact.  Knowledge of recent events intact.  Mini-Mental state score was 30/30 and his clock draw was 4/4.  Cranial nerves: 2-12 intact  Motor: Normal tone and bulk.  There is mild weakness of intrinsic hand muscles and toe extension and an flexion area all other muscles tested in the arms and legs were 5/5.  Reflexes: Trace to +1 in the arms +2 at the knees.  Downgoing toe signs  Sensory: Reduced light touch and pinprick in the feet.  Light touch was also reduced just above the ankles.  Vibration sense was reduced at the ankles and position sense was intact in the great toes.  Cerebellar: Finger to nose rapid movement heel-to-shin normal  Gait and Station: Mildly broad-based.  Mildly unsteady.  He states he is mildly lightheaded.  No Romberg no drift        Results:      Lab Results   Component Value Date    GLUCOSE 100 (H) 12/01/2018     BUN 24 (H) 12/01/2018    CREATININE 1.64 (H) 12/01/2018    EGFRIFNONA 43 (L) 12/01/2018    EGFRIFAFRI 74 12/12/2017    BCR 14.6 12/01/2018    CO2 23.1 12/01/2018    CALCIUM 8.8 12/01/2018    PROTENTOTREF 6.7 12/12/2017    ALBUMIN 3.30 (L) 11/30/2018    LABIL2 1.3 12/12/2017    AST 16 11/30/2018    ALT 19 11/30/2018       Lab Results   Component Value Date    WBC 6.21 12/01/2018    HGB 10.0 (L) 12/01/2018    HCT 32.5 (L) 12/01/2018    MCV 82.1 12/01/2018     12/01/2018         .No results found for: RPR      Lab Results   Component Value Date    TSH 5.490 (H) 12/18/2017    B3LZFZH 7.2 12/18/2017    THYROIDAB 12 08/20/2015         Lab Results   Component Value Date    KTYTTMYD06 388 12/02/2017         No results found for: FOLATE      Lab Results   Component Value Date    HGBA1C 10.60 (H) 05/02/2018       CT brain films reviewed as noted above      Assessment:   1.  Altered mental status-suspect combination of depression, sleep apnea with excessive daytime somnolence and medical conditions such as his heart failure to be origin of this.  He has a normal Mini-Mental state and clock draw.  2.  Abnormal CT scan of the brain-small vessel changes seen on head CT.  No change since previous study.  3.  Diabetic polyneuropathy due to poorly controlled diabetes which appears moderate in severity.  4.  Orthostatic hypotension most likely due to his diabetic neuropathy and may be exacerbated by diuretics and antihypertensives.  5.  Excessive daytime drowsiness-most likely associated with depression and sleep apnea          Plan:  1.  Agree with MRI of the brain and EEG  2.  Labs including sedimentation rate, RPR, B12 and folate, thyroid functions  3.  Recommend target control of his vascular risk factors and aspirin 81 mg daily  4.  Recommend outpatient evaluation of sleep apnea and continued efforts to treat his depression which appears to be treatment resistant.                            .

## 2018-12-01 NOTE — ED NOTES
"Patient states, \"on Thursday my primary care ordered a CT scan and I got results today and he told me to come the ER, the scan showed I had pneumonia, CHF, and pulmonary edema. I went to my doctor because chance been confused, chest pain and swelling in my legs, its hard to concentrate.\"   C/o SOB, tired with exertion.   Denies n/v/d.  Cardiac hx, hx CVA.   Patient denies CP, SOB, or any other s/s at this time. Patient in NAD at this time, VSS, call light within reach, patient alert.        Nida Schmitt, RN  11/30/18 1957    "

## 2018-12-01 NOTE — H&P
[facility] Admission H&P    Patient Care Team:  Jyoti Reynolds MD as PCP - General (Internal Medicine)  Amber Murguia MD as Consulting Physician (Cardiology)    Chief complaint: Shortness of breath    History of Present Illness    This is a 63-year-old male who is known to me from recent admission back in September when he had respiratory failure after colonoscopy and was found to have pneumonia.  He read presented to the emergency room last night at the past of his primary care physician after he went to the office complaining of dyspnea on exertion.  He had a CT scan of the chest that showed moderate size bilateral pleural effusions and possible right-sided pneumonia along with persistent mild mediastinal lymphadenopathy.  The patient was admitted overnight last night.  Today he states that he is mildly short of breath at rest but more so when he gets up and exerts himself.  He denies any cough or fever.  He states that his legs have been swelling over the past several weeks.  He has a history of noncompliance of medications and is unable to tell me if he has been taking his diuretics at home.  He denies any chest pain or palpitations.  X-ray in the emergency room showed a persistent right-sided pleural effusion.  He states that he has been feeling quite confused over the past several days but is currently awake, alert, and oriented ×3.    Past Medical History:   Diagnosis Date   • Acquired hypothyroidism    • Acute congestive heart failure (CMS/HCC)    • Acute respiratory failure with hypoxia (CMS/HCC)    • Acute sinusitis    • SYLVAIN (acute kidney injury) (CMS/HCC)     on CKD   • Anemia    • Arthritis    • Cancer (CMS/HCC)     skin   • Chronic ischemic heart disease    • CKD (chronic kidney disease)    • Depression    • Diabetes mellitus type 2, uncontrolled, without complications (CMS/HCC)    • Disease of thyroid gland    • Fatigue    • Heart attack (CMS/HCC)    • History of coronary artery disease     with  remote history of bypass surgery in 2001   • History of transfusion    • Hyperglycemia    • Hyperlipidemia    • Hypertension    • Hypertensive encephalopathy    • Mental status change     Acute   • Pneumonia    • RBBB (right bundle branch block)    • Screening for prostate cancer 2011   • Stroke (CMS/HCC)    • TIA (transient ischemic attack)    • Type 2 diabetes mellitus (CMS/HCC)    • Urinary retention    • Vitamin D deficiency      Past Surgical History:   Procedure Laterality Date   • APPENDECTOMY N/A 02/14/2016    Dr. Alexey Dodson   • COLONOSCOPY      over 20 years ago.  no polyps    • CORONARY ARTERY BYPASS GRAFT  11/2001   • TONSILLECTOMY     • VASECTOMY       Family History   Problem Relation Age of Onset   • Diabetes Mother    • Hypertension Mother    • Macular degeneration Mother    • Alcohol abuse Father    • Cancer Father         lung   • Alcohol abuse Brother      Social History     Tobacco Use   • Smoking status: Never Smoker   • Smokeless tobacco: Never Used   • Tobacco comment: daily caffiene   Substance Use Topics   • Alcohol use: No   • Drug use: No     Medications Prior to Admission   Medication Sig Dispense Refill Last Dose   • amLODIPine (NORVASC) 5 MG tablet Take 1 tablet by mouth Daily for 180 days. 90 tablet 1 Taking   • aspirin 81 MG EC tablet Take 1 tablet by mouth Daily for 180 days. 90 tablet 1 Taking   • atorvastatin (LIPITOR) 40 MG tablet Take 1 tablet by mouth Every Night for 180 days. 90 tablet 1 Taking   • buPROPion XL (WELLBUTRIN XL) 150 MG 24 hr tablet Take 1 tablet by mouth Daily for 180 days. 90 tablet 1 Taking   • clopidogrel (PLAVIX) 75 MG tablet Take 1 tablet by mouth Daily for 180 days. 90 tablet 1 Taking   • docusate sodium (COLACE) 100 MG capsule Take 1 capsule by mouth 2 (two) times a day. (Patient taking differently: Take 100 mg by mouth As Needed for Constipation.) 30 capsule 0 Taking   • insulin glulisine (APIDRA) 100 UNIT/ML injection Inject  under the skin into the  "appropriate area as directed 3 (Three) Times a Day Before Meals. Sliding scale    Pt states \"I rarely take it, maybe once a week. I just don't like sticking my self.\"   Patient Taking Differently at Unknown time   • Insulin Pen Needle (B-D ULTRAFINE III SHORT PEN) 31G X 8 MM misc Use to inject 4x daily 150 each 5 Taking   • levothyroxine (SYNTHROID) 50 MCG tablet Take 1 tablet by mouth Daily. 30 tablet 5 Taking   • metoprolol tartrate (LOPRESSOR) 100 MG tablet Take 1 tablet by mouth 2 (Two) Times a Day for 180 days. 180 tablet 1 Taking   • RELION GLUCOSE TEST STRIPS test strip 1 each by Other route 2 (two) times a day. Use as instructed   Taking   • vitamin D (ERGOCALCIFEROL) 90783 units capsule capsule Take 1 cap 3 times weekly 24 capsule 1 Taking     Allergies:  Fluoxetine and Prozac [fluoxetine hcl]    Review of Systems   Constitutional: Negative for chills and fever.   HENT: Negative for congestion and sore throat.    Eyes: Negative for visual disturbance.   Respiratory: Positive for shortness of breath. Negative for cough, chest tightness and wheezing.    Cardiovascular: Positive for leg swelling. Negative for chest pain and palpitations.   Gastrointestinal: Negative for abdominal distention, abdominal pain, diarrhea, nausea and vomiting.   Endocrine: Negative for polydipsia and polyuria.   Genitourinary: Negative for difficulty urinating, dysuria, frequency and urgency.   Musculoskeletal: Negative for arthralgias and myalgias.   Skin: Negative for color change and rash.   Neurological: Negative for dizziness and light-headedness.   Psychiatric/Behavioral: Positive for confusion.        PHYSICAL EXAM    Vital Signs  tMax 24 hrs:  Temp (24hrs), Av.7 °F (36.5 °C), Min:97.5 °F (36.4 °C), Max:98.1 °F (36.7 °C)    Vitals Ranges:  Temp:  [97.5 °F (36.4 °C)-98.1 °F (36.7 °C)] 97.5 °F (36.4 °C)  Heart Rate:  [57-65] 62  Resp:  [16-20] 16  BP: (134-155)/(71-95) 150/75    Physical Exam   Constitutional: He is " oriented to person, place, and time. He appears well-developed and well-nourished.   HENT:   Head: Normocephalic and atraumatic.   Eyes: EOM are normal. Pupils are equal, round, and reactive to light.   Neck: Neck supple. No tracheal deviation present.   Cardiovascular: Normal rate and regular rhythm. Exam reveals no gallop.   No murmur heard.  Pulmonary/Chest: Effort normal. No respiratory distress. He has no wheezes.   Decreased breath sounds bilaterally, right greater than left   Abdominal: Soft. Bowel sounds are normal. He exhibits no distension. There is no tenderness.   Musculoskeletal: He exhibits edema. He exhibits no tenderness.   2+ bilateral pitting lower extremity edema   Neurological: He is alert and oriented to person, place, and time. No cranial nerve deficit.   Skin: Skin is warm and dry.   Nursing note and vitals reviewed.      Results Review:  Results from last 7 days   Lab Units  12/01/18 0548   WBC 10*3/mm3  6.21   HEMOGLOBIN g/dL  10.0*   HEMATOCRIT %  32.5*   PLATELETS 10*3/mm3  211     Results from last 7 days   Lab Units  12/01/18 0548  11/30/18 2008   SODIUM mmol/L  141  143   POTASSIUM mmol/L  3.7  4.5   CHLORIDE mmol/L  106  108*   CO2 mmol/L  23.1  25.2   BUN mg/dL  24*  26*   CREATININE mg/dL  1.64*  1.82*   CALCIUM mg/dL  8.8  8.7   BILIRUBIN mg/dL   --   0.3   ALK PHOS U/L   --   76   ALT (SGPT) U/L   --   19   AST (SGOT) U/L   --   16   GLUCOSE mg/dL  100*  212*     Head CT:  The brain and ventricles are symmetrical. There is no evidence  of intracranial hemorrhage, hydrocephalus or of abnormal extra-axial  fluid. Areas of decreased attenuation are appreciated involving the  white matter of the cerebral hemispheres bilaterally, nonspecific but  suggesting small vessel ischemic disease, moderate. The appearance is  similar to the prior examination.     Partially calcified scalp nodules are present in the left parietal  region, unchanged. A mucus retention cysts involving the  right maxillary  sinus is noted.     Further evaluation could be performed with a MRI examination the brain  as indicated.    CXR:  There is a moderate-sized subpulmonic right pleural effusion. A similar  sized effusion was present on the previous chest x-ray. This produces  compressive atelectasis of the right lower lobe. Superimposed right  lower lobe pneumonia cannot be excluded. The left lung appears free of  infiltrates. Only a very tiny left pleural effusion is evident. The  heart is normal in size. Stigmata of previous coronary artery bypass  procedure are evident.     I reviewed the patient's new clinical results.  I reviewed the patient's new imaging results and agree with the interpretation.        Active Hospital Problems    Diagnosis Date Noted   • Pleural effusion [J90] 12/01/2018   • Acute on chronic combined systolic and diastolic congestive heart failure (CMS/HCC) [I50.43] 09/21/2018   • Cerebrovascular accident (CVA) due to embolism of precerebral artery (CMS/[facility]) [I63.10] 02/20/2018   • Acquired hypothyroidism [E03.9] 12/20/2017   • Essential hypertension [I10] 12/20/2016   • CKD (chronic kidney disease) stage 3, GFR 30-59 ml/min (CMS/[facility]) [N18.3] 12/20/2016   • Type 2 diabetes mellitus with ophthalmic complication (CMS/[facility]) [E11.39] 04/25/2016      Resolved Hospital Problems   No resolved problems to display.       Assessment & Plan    The patient was admitted overnight last night.  He has persistent pleural effusion based upon imaging and has never had a thoracentesis before.  Therefore, we will go ahead and proceed with this with appropriate studies.  I do not think he has pneumonia.  This may be more heart failure exacerbation.  I will start him on IV diuretics.  I have discussed with Dr. García of pulmonology who was kind enough to assist in his care.  He will continue to have follow-up scans to monitor the mild mediastinal lymphadenopathy.  We will also ask cardiology to see him.  Neurology  evaluated the patient this morning and is recommending MRI and EEG along with lab work.  We will monitor his renal function with diuresis.  Currently appears near his baseline.  Additional plans based on his clinical course.    I discussed the patients findings and my recommendations with patient, nursing staff and consulting provider    Alexey Haddad MD  12/01/18  10:03 AM

## 2018-12-01 NOTE — PLAN OF CARE
Problem: Patient Care Overview  Goal: Plan of Care Review  Outcome: Ongoing (interventions implemented as appropriate)   12/01/18 0510   Coping/Psychosocial   Plan of Care Reviewed With patient   Plan of Care Review   Progress no change   OTHER   Outcome Summary VSS; sinus meagan at time - metoprolol held; pt admitted w/acute CHF; gen weakness; no c/o pain, n/v, CP, or soa at this time; pt slept well; RML & RLL diminished breath sounds; no acute distress noted; will continue to monitor       Problem: Fall Risk (Adult)  Goal: Identify Related Risk Factors and Signs and Symptoms  Outcome: Ongoing (interventions implemented as appropriate)      Problem: Cardiac: Heart Failure (Adult)  Goal: Signs and Symptoms of Listed Potential Problems Will be Absent, Minimized or Managed (Cardiac: Heart Failure)  Outcome: Ongoing (interventions implemented as appropriate)

## 2018-12-01 NOTE — ED PROVIDER NOTES
" EMERGENCY DEPARTMENT ENCOUNTER    Room Number:  18/18  PCP: Jyoti Reynolds MD   Cardiologist: Dr. Murguia  Historian: Patient, Family  History Limited By: Patient is a vague historian       HPI  Chief Complaint: Dyspnea  Context: Carlito Mo is a 63 y.o. male with h/o stroke who presents to the ED c/o intermittent episodes of dyspnea onset about a week ago. Pt reports that his dyspnea worsens with exertion and improves with rest. Pt reports that he has also had intermittent episodes of sternal chest pain, confusion, and BLE edema. Pt denies documented fever, cough, nausea, vomiting, and abdominal pain. Pt states that he was seen at his PMD's office for his symptoms on 11/28/18 and had a CT Chest performed. Pt reports that earlier today, pt received a phone call from his PMD's office informing him that pt's CT Chest showed bilateral pleural effusions/pulmonary edema c/w CHF and possible right-sided pneumonia. Consequently, pt was referred to the ER for further evaluation. There are no other complaints at this time.       Location: Respiratory/cardiac  Radiation: N/A  Character: \"difficulty breathing\"  Duration: Onset about a week ago  Severity: Moderate  Progression: Waxing and waning  Aggravating Factors: Exertion  Alleviating Factors: Rest        MEDICAL RECORD REVIEW    Pt was seen at his PMD's office on 11/28/18 secondary to enlarged lymph nodes. Pt had a CT Chest performed at [facility] that showed:   1. Interval development of moderate to large bilateral pleural effusions as well as mild pulmonary edema.    2. Moderate right infrahilar atelectasis/infiltrate.    3. Mild mediastinal adenopathy, likely reactive. Follow-up contrast-enhanced CT examination can be performed in 3 months for surveillance      Pt's creatinine was 1.53 about a month ago. Pt's BNP was 2,863 about 2 months ago.           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, in partial remission (CMS/[facility])    • Other hyperlipidemia    • Chronic ischemic heart disease    • Vitamin D deficiency 12/17/2015   • Erectile dysfunction 12/17/2015   • Chronic fatigue 04/25/2016   • Type 2 diabetes mellitus with ophthalmic complication (CMS/[facility]) 04/25/2016   • Skin lesion of chest wall 06/20/2016   • Slow transit constipation 09/01/2016   • Benign prostatic hyperplasia with nocturia 12/20/2016   • Chronic kidney disease 12/20/2016   • History of basal cell carcinoma 12/20/2016   • Essential hypertension 12/20/2016   • Acquired hypothyroidism 12/20/2017   • Cerebrovascular accident (CVA) due to embolism of precerebral artery (CMS/[facility]) 02/20/2018   • Urinary retention 09/20/2018   • SYLVAIN (acute kidney injury) (CMS/[facility]) 09/20/2018   • Pneumonia 09/21/2018   • Acute congestive heart failure (CMS/[facility]) 09/21/2018   • Acute respiratory failure with hypoxia (CMS/[facility]) 09/21/2018   • Suspected sleep apnea 10/29/2018     Resolved Ambulatory Problems     Diagnosis Date Noted   • Anemia    • Arthritis    • Diabetes mellitus out of control (CMS/[facility])    • Type 2 diabetes mellitus (CMS/[facility])    • Acute sinusitis    • Accumulation of fluid in tissues 12/17/2015   • Fatigue 12/17/2015   • Cardiomyopathy, ischemic 12/17/2015   • Acute appendicitis 03/02/2016   • Atherosclerosis of coronary artery 10/16/2012   • Altered mental status 11/30/2017   • Hypertensive urgency 02/16/2018   • Hypertensive encephalopathy syndrome 04/30/2018   • History of medication noncompliance 05/04/2018   • Hyperglycemia 05/04/2018   • Screening for colorectal cancer 08/22/2018   • Constipation 08/22/2018     Past Medical History:   Diagnosis Date   • Acquired hypothyroidism    • Acute congestive heart failure (CMS/[facility])    • Acute respiratory failure with hypoxia (CMS/[facility])    • Acute sinusitis    • SYLVAIN (acute kidney injury) (CMS/[facility])    • Anemia    • Arthritis    • Cancer (CMS/[facility])    • Chronic ischemic heart disease    • CKD  (chronic kidney disease)    • Depression    • Diabetes mellitus type 2, uncontrolled, without complications (CMS/HCC)    • Disease of thyroid gland    • Fatigue    • Heart attack (CMS/HCC)    • History of coronary artery disease    • History of transfusion    • Hyperglycemia    • Hyperlipidemia    • Hypertension    • Hypertensive encephalopathy    • Mental status change    • Pneumonia    • RBBB (right bundle branch block)    • Screening for prostate cancer 2011   • Stroke (CMS/HCC)    • TIA (transient ischemic attack)    • Type 2 diabetes mellitus (CMS/HCC)    • Urinary retention    • Vitamin D deficiency          PAST SURGICAL HISTORY  Past Surgical History:   Procedure Laterality Date   • APPENDECTOMY N/A 02/14/2016    Dr. Alexey Dodson   • COLONOSCOPY      over 20 years ago.  no polyps    • CORONARY ARTERY BYPASS GRAFT  11/2001   • TONSILLECTOMY     • VASECTOMY           FAMILY HISTORY  Family History   Problem Relation Age of Onset   • Diabetes Mother    • Hypertension Mother    • Macular degeneration Mother    • Alcohol abuse Father    • Cancer Father         lung   • Alcohol abuse Brother          SOCIAL HISTORY  Social History     Socioeconomic History   • Marital status:      Spouse name: Not on file   • Number of children: Not on file   • Years of education: Not on file   • Highest education level: Not on file   Social Needs   • Financial resource strain: Not on file   • Food insecurity - worry: Not on file   • Food insecurity - inability: Not on file   • Transportation needs - medical: Not on file   • Transportation needs - non-medical: Not on file   Occupational History   • Not on file   Tobacco Use   • Smoking status: Never Smoker   • Smokeless tobacco: Never Used   • Tobacco comment: daily caffiene   Substance and Sexual Activity   • Alcohol use: No   • Drug use: No   • Sexual activity: Defer   Other Topics Concern   • Not on file   Social History Narrative   • Not on file          ALLERGIES  Fluoxetine and Prozac [fluoxetine hcl]        REVIEW OF SYSTEMS  Review of Systems   Unable to perform ROS: Other (pt is a vague historian)   Constitutional: Negative for fever.   Respiratory: Positive for shortness of breath. Negative for cough.    Cardiovascular: Positive for chest pain and leg swelling (BLE edema).   Gastrointestinal: Negative for abdominal pain, nausea and vomiting.   Psychiatric/Behavioral: Positive for confusion.            PHYSICAL EXAM  ED Triage Vitals   Temp Heart Rate Resp BP SpO2   11/30/18 1904 11/30/18 1904 11/30/18 1904 11/30/18 2000 11/30/18 1904   98.1 °F (36.7 °C) 62 20 149/83 96 %      Temp src Heart Rate Source Patient Position BP Location FiO2 (%)   11/30/18 1904 11/30/18 1904 -- -- --   Oral Monitor            Physical Exam   Constitutional: He is oriented to person, place, and time. No distress.   HENT:   Head: Normocephalic.   Mouth/Throat: Mucous membranes are normal.   Eyes: EOM are normal. Pupils are equal, round, and reactive to light.   Neck: Normal range of motion. Neck supple.   Cardiovascular: Normal rate, regular rhythm and normal heart sounds.   Pulmonary/Chest: Effort normal. No respiratory distress. He has decreased breath sounds (bilaterally). He has no wheezes. He has no rhonchi. He has rales (bilaterally).   Abdominal: Soft. There is no tenderness. There is no rebound and no guarding.   Musculoskeletal: Normal range of motion. He exhibits edema (2+ BLE edema).   Neurological: He is alert and oriented to person, place, and time. He has normal sensation.   Pt is slow to answer questions, but answers the questions appropriately.    Skin: Skin is warm and dry.   Psychiatric: Mood and affect normal.   Nursing note and vitals reviewed.          LAB RESULTS  Recent Results (from the past 24 hour(s))   Comprehensive Metabolic Panel    Collection Time: 11/30/18  8:08 PM   Result Value Ref Range    Glucose 212 (H) 65 - 99 mg/dL    BUN 26 (H) 8 - 23  mg/dL    Creatinine 1.82 (H) 0.76 - 1.27 mg/dL    Sodium 143 136 - 145 mmol/L    Potassium 4.5 3.5 - 5.2 mmol/L    Chloride 108 (H) 98 - 107 mmol/L    CO2 25.2 22.0 - 29.0 mmol/L    Calcium 8.7 8.6 - 10.5 mg/dL    Total Protein 5.9 (L) 6.0 - 8.5 g/dL    Albumin 3.30 (L) 3.50 - 5.20 g/dL    ALT (SGPT) 19 1 - 41 U/L    AST (SGOT) 16 1 - 40 U/L    Alkaline Phosphatase 76 39 - 117 U/L    Total Bilirubin 0.3 0.1 - 1.2 mg/dL    eGFR Non African Amer 38 (L) >60 mL/min/1.73    Globulin 2.6 gm/dL    A/G Ratio 1.3 g/dL    BUN/Creatinine Ratio 14.3 7.0 - 25.0    Anion Gap 9.8 mmol/L   BNP    Collection Time: 11/30/18  8:08 PM   Result Value Ref Range    proBNP 2,555.0 (H) 5.0 - 900.0 pg/mL   Troponin    Collection Time: 11/30/18  8:08 PM   Result Value Ref Range    Troponin T 0.047 (H) 0.000 - 0.030 ng/mL   CBC Auto Differential    Collection Time: 11/30/18  8:08 PM   Result Value Ref Range    WBC 6.98 4.50 - 10.70 10*3/mm3    RBC 3.96 (L) 4.60 - 6.00 10*6/mm3    Hemoglobin 10.1 (L) 13.7 - 17.6 g/dL    Hematocrit 32.4 (L) 40.4 - 52.2 %    MCV 81.8 79.8 - 96.2 fL    MCH 25.5 (L) 27.0 - 32.7 pg    MCHC 31.2 (L) 32.6 - 36.4 g/dL    RDW 13.8 11.5 - 14.5 %    RDW-SD 41.7 37.0 - 54.0 fl    MPV 10.4 6.0 - 12.0 fL    Platelets 224 140 - 500 10*3/mm3    Neutrophil % 66.7 42.7 - 76.0 %    Lymphocyte % 20.9 19.6 - 45.3 %    Monocyte % 9.2 5.0 - 12.0 %    Eosinophil % 3.2 0.3 - 6.2 %    Basophil % 0.0 0.0 - 1.5 %    Immature Grans % 0.0 0.0 - 0.5 %    Neutrophils, Absolute 4.66 1.90 - 8.10 10*3/mm3    Lymphocytes, Absolute 1.46 0.90 - 4.80 10*3/mm3    Monocytes, Absolute 0.64 0.20 - 1.20 10*3/mm3    Eosinophils, Absolute 0.22 0.00 - 0.70 10*3/mm3    Basophils, Absolute 0.00 0.00 - 0.20 10*3/mm3    Immature Grans, Absolute 0.00 0.00 - 0.03 10*3/mm3       Ordered the above labs and reviewed the results.        RADIOLOGY  CT Head Without Contrast   Final Result    FINDINGS: The brain and ventricles are symmetrical. There is no evidence  of  intracranial hemorrhage, hydrocephalus or of abnormal extra-axial  fluid. Areas of decreased attenuation are appreciated involving the  white matter of the cerebral hemispheres bilaterally, nonspecific but  suggesting small vessel ischemic disease, moderate. The appearance is  similar to the prior examination.     Partially calcified scalp nodules are present in the left parietal  region, unchanged. A mucus retention cysts involving the right maxillary  sinus is noted.     Further evaluation could be performed with a MRI examination the brain  as indicated.      XR Chest 2 View   Final Result    There is a moderate-sized subpulmonic right pleural effusion. A similar  sized effusion was present on the previous chest x-ray. This produces  compressive atelectasis of the right lower lobe. Superimposed right  lower lobe pneumonia cannot be excluded. The left lung appears free of  infiltrates. Only a very tiny left pleural effusion is evident. The  heart is normal in size. Stigmata of previous coronary artery bypass  procedure are evident.           Ordered the above noted radiological studies. Reviewed by me in PACS.  Spoke with Dr. Yuen (radiologist) regarding CT Head scan results.          PROCEDURES  Procedures      EKG:          EKG time: 20:01  Rhythm/Rate: Sinus bradycardia rate 53  P waves and MN: Normal P waves  QRS, axis: RBBB   ST and T waves: T wave inversions in V2, V3     Interpreted Contemporaneously by me, independently viewed  Similar compared to prior 09/22/18        MEDICATIONS GIVEN IN ER  Medications   dextrose (GLUTOSE) oral gel 15 g (not administered)   dextrose (D50W) 25 g/ 50mL Intravenous Solution 25 g (not administered)   glucagon (human recombinant) (GLUCAGEN DIAGNOSTIC) injection 1 mg (not administered)   sodium chloride 0.9 % flush 3 mL (not administered)   sodium chloride 0.9 % flush 1-10 mL (not administered)   acetaminophen (TYLENOL) tablet 650 mg (not administered)   insulin lispro  (humaLOG) injection 0-9 Units (0 Units Subcutaneous Hold 11/30/18 2346)   furosemide (LASIX) injection 40 mg (40 mg Intravenous Given 11/30/18 2341)             PROGRESS AND CONSULTS  ED Course as of Dec 01 0000   Fri Nov 30, 2018   2304 11:04 PM  Patient here for confusion and CT showing CHF.  His doctor told him to come in immediately.  His confusion is intermittent.  CT head negative.  His CT chest and labs show that he is in CHF.  Given lasix.  Discussed with Dr. Corona and Ashley with [facility].  Will admit.  [SL]      ED Course User Index  [SL] Jerome Liu MD       8:07 PM:  Blood work, UA, CXR, CT Head, and EKG ordered for further evaluation.     9:06 PM:  Placed call to the cardiologist to discuss further course of care.     9:13 PM:  Rechecked pt. Pt is resting comfortably and appears in no acute distress. Informed pt and family that pt's BNP today is 2,555 and creatinine is 1.82. Pt's CT Head shows chronic degenerative changes, but nothing acute otherwise. Pt and family were also informed of pt's CT Chest results from 11/28/18, suggesting that there has been development of bilateral pleural effusions/pulmonary edema. Will discuss further course of care with the cardiologist. However, need for pt's admission to the hospital for further evaluation and management/treatment. Pt and family agree with plan. Decision time to admit: Now.     9:22 PM:  Discussed the case with Dr. Corona, cardiologist. He would like pt admitted to the hospitalist and he will consult during the admission.     10:06 PM:  Placed call to A for admission.     10:13 PM:  Discussed case with Ms. Ramirez, NP for Dr. Hogue, hospitalist. They will admit pt to a telemetry bed for further evaluation and management/treatment.     11:05 PM:  Lasix ordered to diurese pt.    Pt reports that he is unable to provide a urine specimen at this time.       MEDICAL DECISION MAKING      MDM  Number of Diagnoses or Management Options     Amount  and/or Complexity of Data Reviewed  Clinical lab tests: ordered and reviewed (BNP is 2,555.)  Tests in the radiology section of CPT®: ordered and reviewed (CXR:  There is a moderate-sized subpulmonic right pleural effusion. A similar  sized effusion was present on the previous chest x-ray. This produces  compressive atelectasis of the right lower lobe. Superimposed right  lower lobe pneumonia cannot be excluded. The left lung appears free of  infiltrates. Only a very tiny left pleural effusion is evident. The  heart is normal in size. Stigmata of previous coronary artery bypass  procedure are evident.)  Tests in the medicine section of CPT®: reviewed and ordered (EKG interpreted.   )  Discussion of test results with the performing providers: yes (CT Head results d/w radiologist.   )  Decide to obtain previous medical records or to obtain history from someone other than the patient: yes  Discuss the patient with other providers: yes (Discussed the case with Dr. Corona, cardiologist.   Discussed case with Ms. Ramirez, NP for Dr. Hogue, hospitalist.)    Patient Progress  Patient progress: stable             DIAGNOSIS  Final diagnoses:   Acute congestive heart failure, unspecified heart failure type (CMS/HCC)   Pleural effusion, bilateral   Confusion           DISPOSITION  Pt admitted to telemetry.    ADMISSION    Discussed treatment plan and reason for admission with pt/family and admitting physician.  Pt/family voiced understanding of the plan for admission for further testing/treatment as needed.         Latest Documented Vital Signs:  As of 11:46 PM  BP- 138/74 HR- 60 Temp- 98.1 °F (36.7 °C) (Oral) O2 sat- 94%        --  Documentation assistance provided by lida Mcelroy for Dr. Noe MD.  Information recorded by the scribe was done at my direction and has been verified and validated by me.       Luisito Mcelroy  12/01/18 0000       Jerome Liu MD  12/01/18 0149

## 2018-12-02 ENCOUNTER — APPOINTMENT (OUTPATIENT)
Dept: CARDIOLOGY | Facility: HOSPITAL | Age: 63
End: 2018-12-02
Attending: INTERNAL MEDICINE

## 2018-12-02 ENCOUNTER — APPOINTMENT (OUTPATIENT)
Dept: MRI IMAGING | Facility: HOSPITAL | Age: 63
End: 2018-12-02

## 2018-12-02 LAB
ANION GAP SERPL CALCULATED.3IONS-SCNC: 10.8 MMOL/L
AORTIC DIMENSIONLESS INDEX: 0.6 (DI)
BASOPHILS # BLD AUTO: 0.01 10*3/MM3 (ref 0–0.2)
BASOPHILS NFR BLD AUTO: 0.2 % (ref 0–1.5)
BH CV ECHO MEAS - ACS: 1.8 CM
BH CV ECHO MEAS - AO MAX PG (FULL): 4.1 MMHG
BH CV ECHO MEAS - AO MAX PG: 6.5 MMHG
BH CV ECHO MEAS - AO MEAN PG (FULL): 3 MMHG
BH CV ECHO MEAS - AO MEAN PG: 4 MMHG
BH CV ECHO MEAS - AO ROOT AREA (BSA CORRECTED): 1.1
BH CV ECHO MEAS - AO ROOT AREA: 3.8 CM^2
BH CV ECHO MEAS - AO ROOT DIAM: 2.2 CM
BH CV ECHO MEAS - AO V2 MAX: 127 CM/SEC
BH CV ECHO MEAS - AO V2 MEAN: 91.1 CM/SEC
BH CV ECHO MEAS - AO V2 VTI: 30.1 CM
BH CV ECHO MEAS - AVA(I,A): 2 CM^2
BH CV ECHO MEAS - AVA(I,D): 2 CM^2
BH CV ECHO MEAS - AVA(V,A): 1.9 CM^2
BH CV ECHO MEAS - AVA(V,D): 1.9 CM^2
BH CV ECHO MEAS - BSA(HAYCOCK): 2 M^2
BH CV ECHO MEAS - BSA: 2 M^2
BH CV ECHO MEAS - BZI_BMI: 23.2 KILOGRAMS/M^2
BH CV ECHO MEAS - BZI_METRIC_HEIGHT: 182.9 CM
BH CV ECHO MEAS - BZI_METRIC_WEIGHT: 77.6 KG
BH CV ECHO MEAS - EDV(CUBED): 103.8 ML
BH CV ECHO MEAS - EDV(MOD-SP2): 112 ML
BH CV ECHO MEAS - EDV(MOD-SP4): 126 ML
BH CV ECHO MEAS - EDV(TEICH): 102.4 ML
BH CV ECHO MEAS - EF(CUBED): 58.7 %
BH CV ECHO MEAS - EF(MOD-BP): 43 %
BH CV ECHO MEAS - EF(MOD-SP2): 49.1 %
BH CV ECHO MEAS - EF(MOD-SP4): 40.5 %
BH CV ECHO MEAS - EF(TEICH): 50.3 %
BH CV ECHO MEAS - ESV(CUBED): 42.9 ML
BH CV ECHO MEAS - ESV(MOD-SP2): 57 ML
BH CV ECHO MEAS - ESV(MOD-SP4): 75 ML
BH CV ECHO MEAS - ESV(TEICH): 50.9 ML
BH CV ECHO MEAS - FS: 25.5 %
BH CV ECHO MEAS - IVS/LVPW: 0.93
BH CV ECHO MEAS - IVSD: 1.4 CM
BH CV ECHO MEAS - LAT PEAK E' VEL: 7 CM/SEC
BH CV ECHO MEAS - LV DIASTOLIC VOL/BSA (35-75): 63.2 ML/M^2
BH CV ECHO MEAS - LV MASS(C)D: 279.4 GRAMS
BH CV ECHO MEAS - LV MASS(C)DI: 140.2 GRAMS/M^2
BH CV ECHO MEAS - LV MAX PG: 2.4 MMHG
BH CV ECHO MEAS - LV MEAN PG: 1 MMHG
BH CV ECHO MEAS - LV SYSTOLIC VOL/BSA (12-30): 37.6 ML/M^2
BH CV ECHO MEAS - LV V1 MAX: 76.7 CM/SEC
BH CV ECHO MEAS - LV V1 MEAN: 55.9 CM/SEC
BH CV ECHO MEAS - LV V1 VTI: 19.2 CM
BH CV ECHO MEAS - LVIDD: 4.7 CM
BH CV ECHO MEAS - LVIDS: 3.5 CM
BH CV ECHO MEAS - LVLD AP2: 8.9 CM
BH CV ECHO MEAS - LVLD AP4: 9 CM
BH CV ECHO MEAS - LVLS AP2: 8.1 CM
BH CV ECHO MEAS - LVLS AP4: 8.4 CM
BH CV ECHO MEAS - LVOT AREA (M): 3.1 CM^2
BH CV ECHO MEAS - LVOT AREA: 3.1 CM^2
BH CV ECHO MEAS - LVOT DIAM: 2 CM
BH CV ECHO MEAS - LVPWD: 1.5 CM
BH CV ECHO MEAS - MED PEAK E' VEL: 4 CM/SEC
BH CV ECHO MEAS - MR MAX PG: 74.6 MMHG
BH CV ECHO MEAS - MR MAX VEL: 432 CM/SEC
BH CV ECHO MEAS - MV A DUR: 0.16 SEC
BH CV ECHO MEAS - MV A MAX VEL: 29 CM/SEC
BH CV ECHO MEAS - MV DEC SLOPE: 317 CM/SEC^2
BH CV ECHO MEAS - MV DEC TIME: 0.31 SEC
BH CV ECHO MEAS - MV E MAX VEL: 112 CM/SEC
BH CV ECHO MEAS - MV E/A: 3.9
BH CV ECHO MEAS - MV MAX PG: 8 MMHG
BH CV ECHO MEAS - MV MEAN PG: 2 MMHG
BH CV ECHO MEAS - MV P1/2T MAX VEL: 135 CM/SEC
BH CV ECHO MEAS - MV P1/2T: 124.7 MSEC
BH CV ECHO MEAS - MV V2 MAX: 141 CM/SEC
BH CV ECHO MEAS - MV V2 MEAN: 56.1 CM/SEC
BH CV ECHO MEAS - MV V2 VTI: 37.9 CM
BH CV ECHO MEAS - MVA P1/2T LCG: 1.6 CM^2
BH CV ECHO MEAS - MVA(P1/2T): 1.8 CM^2
BH CV ECHO MEAS - MVA(VTI): 1.6 CM^2
BH CV ECHO MEAS - PA ACC TIME: 0.09 SEC
BH CV ECHO MEAS - PA MAX PG (FULL): 2 MMHG
BH CV ECHO MEAS - PA MAX PG: 3.9 MMHG
BH CV ECHO MEAS - PA PR(ACCEL): 40.8 MMHG
BH CV ECHO MEAS - PA V2 MAX: 99 CM/SEC
BH CV ECHO MEAS - PVA(V,A): 2.7 CM^2
BH CV ECHO MEAS - PVA(V,D): 2.7 CM^2
BH CV ECHO MEAS - QP/QS: 1.1
BH CV ECHO MEAS - RV MAX PG: 1.9 MMHG
BH CV ECHO MEAS - RV MEAN PG: 1 MMHG
BH CV ECHO MEAS - RV V1 MAX: 69.5 CM/SEC
BH CV ECHO MEAS - RV V1 MEAN: 45.2 CM/SEC
BH CV ECHO MEAS - RV V1 VTI: 17.1 CM
BH CV ECHO MEAS - RVOT AREA: 3.8 CM^2
BH CV ECHO MEAS - RVOT DIAM: 2.2 CM
BH CV ECHO MEAS - SI(AO): 57.4 ML/M^2
BH CV ECHO MEAS - SI(CUBED): 30.6 ML/M^2
BH CV ECHO MEAS - SI(LVOT): 30.3 ML/M^2
BH CV ECHO MEAS - SI(MOD-SP2): 27.6 ML/M^2
BH CV ECHO MEAS - SI(MOD-SP4): 25.6 ML/M^2
BH CV ECHO MEAS - SI(TEICH): 25.8 ML/M^2
BH CV ECHO MEAS - SV(AO): 114.4 ML
BH CV ECHO MEAS - SV(CUBED): 60.9 ML
BH CV ECHO MEAS - SV(LVOT): 60.3 ML
BH CV ECHO MEAS - SV(MOD-SP2): 55 ML
BH CV ECHO MEAS - SV(MOD-SP4): 51 ML
BH CV ECHO MEAS - SV(RVOT): 65 ML
BH CV ECHO MEAS - SV(TEICH): 51.5 ML
BH CV ECHO MEAS - TAPSE (>1.6): 2.6 CM2
BH CV ECHO MEAS - TR MAX VEL: 324 CM/SEC
BH CV ECHO MEASUREMENTS AVERAGE E/E' RATIO: 20.36
BH CV VAS BP RIGHT ARM: NORMAL MMHG
BH CV XLRA - RV BASE: 3.6 CM
BH CV XLRA - TDI S': 11 CM/SEC
BUN BLD-MCNC: 25 MG/DL (ref 8–23)
BUN/CREAT SERPL: 14 (ref 7–25)
CALCIUM SPEC-SCNC: 9 MG/DL (ref 8.6–10.5)
CHLORIDE SERPL-SCNC: 103 MMOL/L (ref 98–107)
CO2 SERPL-SCNC: 27.2 MMOL/L (ref 22–29)
CREAT BLD-MCNC: 1.78 MG/DL (ref 0.76–1.27)
DEPRECATED RDW RBC AUTO: 41.2 FL (ref 37–54)
EOSINOPHIL # BLD AUTO: 0.2 10*3/MM3 (ref 0–0.7)
EOSINOPHIL NFR BLD AUTO: 3.2 % (ref 0.3–6.2)
ERYTHROCYTE [DISTWIDTH] IN BLOOD BY AUTOMATED COUNT: 13.8 % (ref 11.5–14.5)
GFR SERPL CREATININE-BSD FRML MDRD: 39 ML/MIN/1.73
GLUCOSE BLD-MCNC: 89 MG/DL (ref 65–99)
GLUCOSE BLDC GLUCOMTR-MCNC: 144 MG/DL (ref 70–130)
GLUCOSE BLDC GLUCOMTR-MCNC: 228 MG/DL (ref 70–130)
GLUCOSE BLDC GLUCOMTR-MCNC: 88 MG/DL (ref 70–130)
HCT VFR BLD AUTO: 35.1 % (ref 40.4–52.2)
HGB BLD-MCNC: 10.6 G/DL (ref 13.7–17.6)
IMM GRANULOCYTES # BLD: 0 10*3/MM3 (ref 0–0.03)
IMM GRANULOCYTES NFR BLD: 0 % (ref 0–0.5)
LEFT ATRIUM VOLUME INDEX: 36 ML/M2
LYMPHOCYTES # BLD AUTO: 1.31 10*3/MM3 (ref 0.9–4.8)
LYMPHOCYTES NFR BLD AUTO: 20.7 % (ref 19.6–45.3)
MAXIMAL PREDICTED HEART RATE: 157 BPM
MCH RBC QN AUTO: 24.7 PG (ref 27–32.7)
MCHC RBC AUTO-ENTMCNC: 30.2 G/DL (ref 32.6–36.4)
MCV RBC AUTO: 81.8 FL (ref 79.8–96.2)
MONOCYTES # BLD AUTO: 0.58 10*3/MM3 (ref 0.2–1.2)
MONOCYTES NFR BLD AUTO: 9.1 % (ref 5–12)
NEUTROPHILS # BLD AUTO: 4.24 10*3/MM3 (ref 1.9–8.1)
NEUTROPHILS NFR BLD AUTO: 66.8 % (ref 42.7–76)
PLATELET # BLD AUTO: 241 10*3/MM3 (ref 140–500)
PMV BLD AUTO: 10.5 FL (ref 6–12)
POTASSIUM BLD-SCNC: 4.4 MMOL/L (ref 3.5–5.2)
RBC # BLD AUTO: 4.29 10*6/MM3 (ref 4.6–6)
SODIUM BLD-SCNC: 141 MMOL/L (ref 136–145)
STRESS TARGET HR: 133 BPM
T4 FREE SERPL-MCNC: 1.27 NG/DL (ref 0.93–1.7)
TSH SERPL DL<=0.05 MIU/L-ACNC: 4.93 MIU/ML (ref 0.27–4.2)
WBC NRBC COR # BLD: 6.34 10*3/MM3 (ref 4.5–10.7)

## 2018-12-02 PROCEDURE — 70551 MRI BRAIN STEM W/O DYE: CPT

## 2018-12-02 PROCEDURE — 93306 TTE W/DOPPLER COMPLETE: CPT

## 2018-12-02 PROCEDURE — 99232 SBSQ HOSP IP/OBS MODERATE 35: CPT | Performed by: INTERNAL MEDICINE

## 2018-12-02 PROCEDURE — 80048 BASIC METABOLIC PNL TOTAL CA: CPT | Performed by: INTERNAL MEDICINE

## 2018-12-02 PROCEDURE — 84443 ASSAY THYROID STIM HORMONE: CPT | Performed by: INTERNAL MEDICINE

## 2018-12-02 PROCEDURE — 93306 TTE W/DOPPLER COMPLETE: CPT | Performed by: INTERNAL MEDICINE

## 2018-12-02 PROCEDURE — 63710000001 INSULIN LISPRO (HUMAN) PER 5 UNITS: Performed by: NURSE PRACTITIONER

## 2018-12-02 PROCEDURE — 86593 SYPHILIS TEST NON-TREP QUANT: CPT | Performed by: INTERNAL MEDICINE

## 2018-12-02 PROCEDURE — 85025 COMPLETE CBC W/AUTO DIFF WBC: CPT | Performed by: INTERNAL MEDICINE

## 2018-12-02 PROCEDURE — 25010000002 FUROSEMIDE PER 20 MG: Performed by: INTERNAL MEDICINE

## 2018-12-02 PROCEDURE — 82962 GLUCOSE BLOOD TEST: CPT

## 2018-12-02 PROCEDURE — 84439 ASSAY OF FREE THYROXINE: CPT | Performed by: INTERNAL MEDICINE

## 2018-12-02 PROCEDURE — 25010000002 PERFLUTREN (DEFINITY) 8.476 MG IN SODIUM CHLORIDE 0.9 % 10 ML INJECTION: Performed by: INTERNAL MEDICINE

## 2018-12-02 RX ORDER — LEVOTHYROXINE SODIUM 0.07 MG/1
75 TABLET ORAL DAILY
Status: DISCONTINUED | OUTPATIENT
Start: 2018-12-03 | End: 2018-12-05 | Stop reason: HOSPADM

## 2018-12-02 RX ORDER — AMLODIPINE BESYLATE 5 MG/1
5 TABLET ORAL
Status: DISCONTINUED | OUTPATIENT
Start: 2018-12-02 | End: 2018-12-05 | Stop reason: HOSPADM

## 2018-12-02 RX ADMIN — METOPROLOL TARTRATE 100 MG: 100 TABLET, FILM COATED ORAL at 22:40

## 2018-12-02 RX ADMIN — INSULIN LISPRO 4 UNITS: 100 INJECTION, SOLUTION INTRAVENOUS; SUBCUTANEOUS at 13:49

## 2018-12-02 RX ADMIN — CLOPIDOGREL 75 MG: 75 TABLET, FILM COATED ORAL at 08:51

## 2018-12-02 RX ADMIN — METOPROLOL TARTRATE 100 MG: 100 TABLET, FILM COATED ORAL at 08:51

## 2018-12-02 RX ADMIN — PERFLUTREN 3 ML: 6.52 INJECTION, SUSPENSION INTRAVENOUS at 13:00

## 2018-12-02 RX ADMIN — SODIUM CHLORIDE, PRESERVATIVE FREE 3 ML: 5 INJECTION INTRAVENOUS at 08:51

## 2018-12-02 RX ADMIN — ATORVASTATIN CALCIUM 40 MG: 20 TABLET, FILM COATED ORAL at 22:40

## 2018-12-02 RX ADMIN — BUPROPION HYDROCHLORIDE 150 MG: 150 TABLET, FILM COATED, EXTENDED RELEASE ORAL at 08:51

## 2018-12-02 RX ADMIN — SODIUM CHLORIDE, PRESERVATIVE FREE 3 ML: 5 INJECTION INTRAVENOUS at 22:42

## 2018-12-02 RX ADMIN — LEVOTHYROXINE SODIUM 50 MCG: 50 TABLET ORAL at 08:51

## 2018-12-02 RX ADMIN — INSULIN LISPRO 4 UNITS: 100 INJECTION, SOLUTION INTRAVENOUS; SUBCUTANEOUS at 17:22

## 2018-12-02 RX ADMIN — AMLODIPINE BESYLATE 5 MG: 5 TABLET ORAL at 15:10

## 2018-12-02 RX ADMIN — FUROSEMIDE 40 MG: 10 INJECTION, SOLUTION INTRAMUSCULAR; INTRAVENOUS at 08:50

## 2018-12-02 RX ADMIN — ASPIRIN 81 MG: 81 TABLET, DELAYED RELEASE ORAL at 08:51

## 2018-12-02 NOTE — PLAN OF CARE
Problem: Patient Care Overview  Goal: Plan of Care Review  Outcome: Ongoing (interventions implemented as appropriate)   12/02/18 0511   Coping/Psychosocial   Plan of Care Reviewed With patient   OTHER   Outcome Summary Patient resting in bed. Not in any disttess. Vital signs as charted. Dressing to right side post Thoracenthesos dry and intact. Fall precautions enforced. Monitored.     Goal: Individualization and Mutuality  Outcome: Ongoing (interventions implemented as appropriate)    Goal: Discharge Needs Assessment  Outcome: Ongoing (interventions implemented as appropriate)      Problem: Fall Risk (Adult)  Goal: Identify Related Risk Factors and Signs and Symptoms  Outcome: Ongoing (interventions implemented as appropriate)    Goal: Absence of Fall  Outcome: Ongoing (interventions implemented as appropriate)      Problem: Cardiac: Heart Failure (Adult)  Goal: Signs and Symptoms of Listed Potential Problems Will be Absent, Minimized or Managed (Cardiac: Heart Failure)  Outcome: Ongoing (interventions implemented as appropriate)

## 2018-12-02 NOTE — PLAN OF CARE
Problem: Patient Care Overview  Goal: Plan of Care Review  Outcome: Ongoing (interventions implemented as appropriate)   12/02/18 1769   Coping/Psychosocial   Plan of Care Reviewed With patient   Plan of Care Review   Progress improving   OTHER   Outcome Summary Transfer from  because of abnormal MRI, Nihss 0, no complaint of pain     Goal: Individualization and Mutuality  Outcome: Ongoing (interventions implemented as appropriate)      Problem: Fall Risk (Adult)  Goal: Absence of Fall  Outcome: Ongoing (interventions implemented as appropriate)      Problem: Cardiac: Heart Failure (Adult)  Goal: Signs and Symptoms of Listed Potential Problems Will be Absent, Minimized or Managed (Cardiac: Heart Failure)  Outcome: Ongoing (interventions implemented as appropriate)

## 2018-12-02 NOTE — PROGRESS NOTES
" LOS: 2 days     Name: Carlito Mo  Age: 63 y.o.  Sex: male  :  1955  MRN: [id number]         Primary Care Physician: Jyoti Reynolds MD    Subjective   Subjective  No new complaints at present.  Denies shortness of breath or chest pain.  Feels better after having thoracentesis yesterday.    Objective   Vital Signs  Temp:  [97.8 °F (36.6 °C)-98.2 °F (36.8 °C)] 97.9 °F (36.6 °C)  Heart Rate:  [54-60] 60  Resp:  [16-18] 16  BP: (126-150)/(66-78) 150/77  Body mass index is 23.19 kg/m².    Objective:  General Appearance:  Comfortable and in no acute distress.    Vital signs: (most recent): Blood pressure 150/77, pulse 60, temperature 97.9 °F (36.6 °C), temperature source Oral, resp. rate 16, height 182.9 cm (72\"), weight 77.6 kg (171 lb), SpO2 98 %.    Lungs:  Normal effort and normal respiratory rate.  There are decreased breath sounds.  (Decreased breath sounds at the bilateral bases)  Heart: Normal rate.  Regular rhythm.    Abdomen: Abdomen is soft.  Bowel sounds are normal.   There is no abdominal tenderness.     Extremities: There is no dependent edema or local swelling.    Neurological: Patient is alert and oriented to person, place and time.    Skin:  Warm and dry.              Results Review:       I reviewed the patient's new clinical results.    Results from last 7 days   Lab Units  18   [id number]  18   WBC 10*3/mm3  6.34  6.21  6.98   HEMOGLOBIN g/dL  10.6*  10.0*  10.1*   PLATELETS 10*3/mm3  241  211  224     Results from last 7 days   Lab Units  18   0606  1848  18   SODIUM mmol/L  141  141  143   POTASSIUM mmol/L  4.4  3.7  4.5   CHLORIDE mmol/L  103  106  108*   CO2 mmol/L  27.2  23.1  25.2   BUN mg/dL  25*  24*  26*   CREATININE mg/dL  1.78*  1.64*  1.82*   CALCIUM mg/dL  9.0  8.8  8.7   GLUCOSE mg/dL  89  100*  212*     Results from last 7 days   Lab Units  18   0945   INR   1.14*     Scheduled Meds:     aspirin 81 mg Oral " Daily   atorvastatin 40 mg Oral Nightly   buPROPion  mg Oral Daily   clopidogrel 75 mg Oral Daily   insulin lispro 0-9 Units Subcutaneous 4x Daily With Meals & Nightly   levothyroxine 50 mcg Oral Daily   metoprolol tartrate 100 mg Oral BID   sodium chloride 3 mL Intravenous Q12H     PRN Meds:   •  acetaminophen  •  dextrose  •  dextrose  •  glucagon (human recombinant)  •  hold  •  sodium chloride  Continuous Infusions:    hold 1 each       Assessment/Plan   Active Hospital Problems    Diagnosis Date Noted   • Pleural effusion [J90] 12/01/2018   • Acute on chronic combined systolic and diastolic congestive heart failure (CMS/[facility]) [I50.43] 09/21/2018   • Cerebrovascular accident (CVA) due to embolism of precerebral artery (CMS/[facility]) [I63.10] 02/20/2018   • Acquired hypothyroidism [E03.9] 12/20/2017   • Essential hypertension [I10] 12/20/2016   • CKD (chronic kidney disease) stage 3, GFR 30-59 ml/min (CMS/[facility]) [N18.3] 12/20/2016   • Type 2 diabetes mellitus with ophthalmic complication (CMS/[facility]) [E11.39] 04/25/2016      Resolved Hospital Problems   No resolved problems to display.       Assessment & Plan    - Pleural effusion appears to be transudative.  Likely related to his heart failure.  Cytology pending.  - No hypoxia today and no respiratory distress.  - Received 3 doses of IV Lasix and appears euvolemic at this time.  - Echocardiogram has been ordered with results pending.  He'll uses to help determine his medication regimen moving forward and whether he needs cardiac catheterization.  Cardiology following.  - MRI of the brain has been reported to show both acute and subacute infarcts.  This was phoned to the nurse just a little while ago with official report pending.  Continue aspirin and statin.  Additional workup per neurology.  EEG pending  - I will go ahead and transfer him to a neuro floor  - Renal function appears stable.  Continue to monitor.  - Glucose stable  - TSH is mildly elevated.  I will  slightly increase the dose of his Synthroid.    Alexey Haddad MD  [facility]  12/02/18  1:35 PM

## 2018-12-02 NOTE — PROGRESS NOTES
LOS: 2 days   Patient Care Team:  Jyoti Reynolds MD as PCP - General (Internal Medicine)  Amber Murguia MD as Consulting Physician (Cardiology)    Chief Complaint: Follow-up for acute on chronic diastolic CHF, bilateral pleural effusions, CAD status post CABG    Interval History: Found to have multiple infarcts on MRI of brain, including a small right frontal lobe infarct.  Echo with EF 43%.  Had 2 liters drained off the right lung.  SOA slightly better.  No CP.      Vital Signs:  Temp:  [97.4 °F (36.3 °C)-98.2 °F (36.8 °C)] 97.4 °F (36.3 °C)  Heart Rate:  [54-60] 55  Resp:  [16-18] 16  BP: (126-150)/(66-81) 146/81    Intake/Output Summary (Last 24 hours) at 12/2/2018 1543  Last data filed at 12/2/2018 0341  Gross per 24 hour   Intake 340 ml   Output 1475 ml   Net -1135 ml       Physical Exam:   General Appearance:    No acute distress, alert and oriented x4   Lungs:     Clear to auscultation bilaterally     Heart:    Regular rhythm and normal rate.  II/VI SM throughout.   Abdomen:     Soft, non-tender, non-distended.    Extremities:   1-2+ edema      Results Review:    Results from last 7 days   Lab Units  12/02/18   0606   SODIUM mmol/L  141   POTASSIUM mmol/L  4.4   CHLORIDE mmol/L  103   CO2 mmol/L  27.2   BUN mg/dL  25*   CREATININE mg/dL  1.78*   GLUCOSE mg/dL  89   CALCIUM mg/dL  9.0     Results from last 7 days   Lab Units  12/01/18   0548  12/01/18   0202  11/30/18 2008   TROPONIN T ng/mL  0.048*  0.042*  0.047*     Results from last 7 days   Lab Units  12/02/18   0606   WBC 10*3/mm3  6.34   HEMOGLOBIN g/dL  10.6*   HEMATOCRIT %  35.1*   PLATELETS 10*3/mm3  241     Results from last 7 days   Lab Units  12/01/18   0945   INR   1.14*                   I reviewed the patient's new clinical results.        Assessment:  1. Acute on chronic combined CHF - EF 43%  2. Bilateral pleural effusions - status post thoracentesis on right on 12/2/18 (2 liters)  3. Intermittent confusion and altered mental  status  4. CAD status post CABG in 2001  5. Multiple small CVAs 11/2017.  Small acute infarct right frontal lobe/  6. Acute kidney injury with CKD  7. Chronically elevated troponin   8. Mediastinal lymphadenopathy of CT scan   9. Diabetes   10. Chronic anemia     Plan:  -Small acute CVA noted on MRI of brain.  No obvious embolic source on echo.  If NEISHA is needed, this will need to wait until he is better from a CHF standpoint.  Neuro following.  -2 liters drained from right lung today.  -Continue Lasix 40 mg IV bid.  Watch renal function.  -ASA and Plavix given CAD  -Not on ACEI secondary to renal function.    Mauricio Corona MD  12/02/18  3:43 PM

## 2018-12-02 NOTE — PROGRESS NOTES
[facility]     Mar/chart reviewed  F/u pleural effusion  Pt not in room    Vital Sign Min/Max for last 24 hours  Temp  Min: 97.8 °F (36.6 °C)  Max: 98.2 °F (36.8 °C)   BP  Min: 126/78  Max: 150/77   Pulse  Min: 54  Max: 60   Resp  Min: 16  Max: 18   SpO2  Min: 96 %  Max: 100 %   No Data Recorded   Weight  Min: 77.6 kg (171 lb)  Max: 77.6 kg (171 lb 1.2 oz)     Exam deferred off floor    Labs:  Total protein 1.3  ldh 3.7  Glucose 186    A/P:  1. Acute on chronic systolic chf -- agree with diuresis  2. Pleural effusions -- clearly transudate  3. Mediastinal lymphadenopathy -- mild, likely reactive, would recommend repeat ct chest in 4 months time  4. CKD  5. Anemia  6. Elevated troponin -- per cards  7. H/o CVA

## 2018-12-03 ENCOUNTER — APPOINTMENT (OUTPATIENT)
Dept: GENERAL RADIOLOGY | Facility: HOSPITAL | Age: 63
End: 2018-12-03
Attending: INTERNAL MEDICINE

## 2018-12-03 ENCOUNTER — APPOINTMENT (OUTPATIENT)
Dept: NEUROLOGY | Facility: HOSPITAL | Age: 63
End: 2018-12-03
Attending: INTERNAL MEDICINE

## 2018-12-03 LAB
ANION GAP SERPL CALCULATED.3IONS-SCNC: 12 MMOL/L
BASOPHILS # BLD AUTO: 0.01 10*3/MM3 (ref 0–0.2)
BASOPHILS NFR BLD AUTO: 0.1 % (ref 0–1.5)
BUN BLD-MCNC: 27 MG/DL (ref 8–23)
BUN/CREAT SERPL: 16.6 (ref 7–25)
CALCIUM SPEC-SCNC: 8.7 MG/DL (ref 8.6–10.5)
CHLORIDE SERPL-SCNC: 100 MMOL/L (ref 98–107)
CHOLEST SERPL-MCNC: 124 MG/DL (ref 0–200)
CO2 SERPL-SCNC: 27 MMOL/L (ref 22–29)
CREAT BLD-MCNC: 1.63 MG/DL (ref 0.76–1.27)
DEPRECATED RDW RBC AUTO: 41.2 FL (ref 37–54)
EOSINOPHIL # BLD AUTO: 0.26 10*3/MM3 (ref 0–0.7)
EOSINOPHIL NFR BLD AUTO: 3.5 % (ref 0.3–6.2)
ERYTHROCYTE [DISTWIDTH] IN BLOOD BY AUTOMATED COUNT: 13.7 % (ref 11.5–14.5)
GFR SERPL CREATININE-BSD FRML MDRD: 43 ML/MIN/1.73
GLUCOSE BLD-MCNC: 123 MG/DL (ref 65–99)
GLUCOSE BLDC GLUCOMTR-MCNC: 192 MG/DL (ref 70–130)
GLUCOSE BLDC GLUCOMTR-MCNC: 200 MG/DL (ref 70–130)
HBA1C MFR BLD: 8.8 % (ref 4.8–5.6)
HCT VFR BLD AUTO: 33.9 % (ref 40.4–52.2)
HDLC SERPL-MCNC: 49 MG/DL (ref 40–60)
HGB BLD-MCNC: 10.4 G/DL (ref 13.7–17.6)
IMM GRANULOCYTES # BLD: 0 10*3/MM3 (ref 0–0.03)
IMM GRANULOCYTES NFR BLD: 0 % (ref 0–0.5)
LDLC SERPL CALC-MCNC: 61 MG/DL (ref 0–100)
LDLC/HDLC SERPL: 1.24 {RATIO}
LYMPHOCYTES # BLD AUTO: 1.83 10*3/MM3 (ref 0.9–4.8)
LYMPHOCYTES NFR BLD AUTO: 24.8 % (ref 19.6–45.3)
MCH RBC QN AUTO: 25.1 PG (ref 27–32.7)
MCHC RBC AUTO-ENTMCNC: 30.7 G/DL (ref 32.6–36.4)
MCV RBC AUTO: 81.7 FL (ref 79.8–96.2)
MONOCYTES # BLD AUTO: 0.63 10*3/MM3 (ref 0.2–1.2)
MONOCYTES NFR BLD AUTO: 8.5 % (ref 5–12)
NEUTROPHILS # BLD AUTO: 4.65 10*3/MM3 (ref 1.9–8.1)
NEUTROPHILS NFR BLD AUTO: 63.1 % (ref 42.7–76)
PLATELET # BLD AUTO: 252 10*3/MM3 (ref 140–500)
PMV BLD AUTO: 10.4 FL (ref 6–12)
POTASSIUM BLD-SCNC: 4.1 MMOL/L (ref 3.5–5.2)
RBC # BLD AUTO: 4.15 10*6/MM3 (ref 4.6–6)
SODIUM BLD-SCNC: 139 MMOL/L (ref 136–145)
TRIGL SERPL-MCNC: 72 MG/DL (ref 0–150)
VLDLC SERPL-MCNC: 14.4 MG/DL (ref 5–40)
WBC NRBC COR # BLD: 7.38 10*3/MM3 (ref 4.5–10.7)

## 2018-12-03 PROCEDURE — 85025 COMPLETE CBC W/AUTO DIFF WBC: CPT | Performed by: INTERNAL MEDICINE

## 2018-12-03 PROCEDURE — 95816 EEG AWAKE AND DROWSY: CPT | Performed by: PSYCHIATRY & NEUROLOGY

## 2018-12-03 PROCEDURE — 99233 SBSQ HOSP IP/OBS HIGH 50: CPT | Performed by: INTERNAL MEDICINE

## 2018-12-03 PROCEDURE — 83036 HEMOGLOBIN GLYCOSYLATED A1C: CPT | Performed by: INTERNAL MEDICINE

## 2018-12-03 PROCEDURE — 80061 LIPID PANEL: CPT | Performed by: INTERNAL MEDICINE

## 2018-12-03 PROCEDURE — 80048 BASIC METABOLIC PNL TOTAL CA: CPT | Performed by: INTERNAL MEDICINE

## 2018-12-03 PROCEDURE — 82962 GLUCOSE BLOOD TEST: CPT

## 2018-12-03 PROCEDURE — 97162 PT EVAL MOD COMPLEX 30 MIN: CPT

## 2018-12-03 PROCEDURE — 95816 EEG AWAKE AND DROWSY: CPT

## 2018-12-03 PROCEDURE — 63710000001 INSULIN LISPRO (HUMAN) PER 5 UNITS: Performed by: NURSE PRACTITIONER

## 2018-12-03 PROCEDURE — 71046 X-RAY EXAM CHEST 2 VIEWS: CPT

## 2018-12-03 RX ORDER — FUROSEMIDE 40 MG/1
40 TABLET ORAL DAILY
Status: DISCONTINUED | OUTPATIENT
Start: 2018-12-03 | End: 2018-12-05 | Stop reason: HOSPADM

## 2018-12-03 RX ORDER — METOPROLOL SUCCINATE 100 MG/1
100 TABLET, EXTENDED RELEASE ORAL
Status: DISCONTINUED | OUTPATIENT
Start: 2018-12-04 | End: 2018-12-05 | Stop reason: HOSPADM

## 2018-12-03 RX ADMIN — METOPROLOL TARTRATE 100 MG: 100 TABLET, FILM COATED ORAL at 08:34

## 2018-12-03 RX ADMIN — INSULIN LISPRO 4 UNITS: 100 INJECTION, SOLUTION INTRAVENOUS; SUBCUTANEOUS at 22:10

## 2018-12-03 RX ADMIN — SODIUM CHLORIDE, PRESERVATIVE FREE 3 ML: 5 INJECTION INTRAVENOUS at 22:12

## 2018-12-03 RX ADMIN — CLOPIDOGREL 75 MG: 75 TABLET, FILM COATED ORAL at 08:34

## 2018-12-03 RX ADMIN — ATORVASTATIN CALCIUM 40 MG: 20 TABLET, FILM COATED ORAL at 22:10

## 2018-12-03 RX ADMIN — AMLODIPINE BESYLATE 5 MG: 5 TABLET ORAL at 08:34

## 2018-12-03 RX ADMIN — BUPROPION HYDROCHLORIDE 150 MG: 150 TABLET, FILM COATED, EXTENDED RELEASE ORAL at 08:34

## 2018-12-03 RX ADMIN — INSULIN LISPRO 2 UNITS: 100 INJECTION, SOLUTION INTRAVENOUS; SUBCUTANEOUS at 17:03

## 2018-12-03 RX ADMIN — LEVOTHYROXINE SODIUM 75 MCG: 75 TABLET ORAL at 08:34

## 2018-12-03 RX ADMIN — SODIUM CHLORIDE, PRESERVATIVE FREE 3 ML: 5 INJECTION INTRAVENOUS at 08:34

## 2018-12-03 RX ADMIN — FUROSEMIDE 40 MG: 40 TABLET ORAL at 12:23

## 2018-12-03 RX ADMIN — ASPIRIN 81 MG: 81 TABLET, DELAYED RELEASE ORAL at 08:34

## 2018-12-03 NOTE — PROGRESS NOTES
" LOS: 3 days     Name: Carlito Mo  Age: 63 y.o.  Sex: male  :  1955  MRN: [id number]         Primary Care Physician: Jyoti Reynolds MD    Subjective   Subjective  No new complaints.  Feels better today.  No overnight events.    Objective   Vital Signs  Temp:  [97.3 °F (36.3 °C)-97.8 °F (36.6 °C)] 97.3 °F (36.3 °C)  Heart Rate:  [53-60] 56  Resp:  [16-18] 18  BP: (125-152)/(65-85) 152/85  Body mass index is 23.86 kg/m².    Objective:  General Appearance:  Comfortable and in no acute distress.    Vital signs: (most recent): Blood pressure 152/85, pulse 56, temperature 97.3 °F (36.3 °C), temperature source Oral, resp. rate 18, height 182.9 cm (72\"), weight 79.8 kg (175 lb 14.4 oz), SpO2 96 %.    Lungs:  Normal effort and normal respiratory rate.    Heart: Normal rate.  Regular rhythm.    Abdomen: Abdomen is soft.  Bowel sounds are normal.   There is no abdominal tenderness.     Extremities: There is no dependent edema or local swelling.    Neurological: Patient is alert and oriented to person, place and time.    Skin:  Warm and dry.              Results Review:       I reviewed the patient's new clinical results.    Results from last 7 days   Lab Units  18   0449  1848  18   WBC 10*3/mm3  7.38  6.34  6.21  6.98   HEMOGLOBIN g/dL  10.4*  10.6*  10.0*  10.1*   PLATELETS 10*3/mm3  252  241  211  224     Results from last 7 days   Lab Units  18   0450  18   0606  1848  18   SODIUM mmol/L  139  141  141  143   POTASSIUM mmol/L  4.1  4.4  3.7  4.5   CHLORIDE mmol/L  100  103  106  108*   CO2 mmol/L  27.0  27.2  23.1  25.2   BUN mg/dL  27*  25*  24*  26*   CREATININE mg/dL  1.63*  1.78*  1.64*  1.82*   CALCIUM mg/dL  8.7  9.0  8.8  8.7   GLUCOSE mg/dL  123*  89  100*  212*     Results from last 7 days   Lab Units  18   0945   INR   1.14*       Scheduled Meds:     amLODIPine 5 mg Oral Q24H   aspirin 81 mg Oral Daily "   atorvastatin 40 mg Oral Nightly   buPROPion  mg Oral Daily   clopidogrel 75 mg Oral Daily   furosemide 40 mg Oral Daily   insulin lispro 0-9 Units Subcutaneous 4x Daily With Meals & Nightly   levothyroxine 75 mcg Oral Daily   [START ON 12/4/2018] metoprolol succinate  mg Oral Q24H   sodium chloride 3 mL Intravenous Q12H     PRN Meds:   •  acetaminophen  •  dextrose  •  dextrose  •  glucagon (human recombinant)  •  hold  •  sodium chloride  Continuous Infusions:    hold 1 each       Assessment/Plan   Active Hospital Problems    Diagnosis Date Noted   • Pleural effusion [J90] 12/01/2018   • Acute on chronic combined systolic and diastolic congestive heart failure (CMS/[facility]) [I50.43] 09/21/2018   • Cerebrovascular accident (CVA) due to embolism of precerebral artery (CMS/[facility]) [I63.10] 02/20/2018   • Acquired hypothyroidism [E03.9] 12/20/2017   • Essential hypertension [I10] 12/20/2016   • CKD (chronic kidney disease) stage 3, GFR 30-59 ml/min (CMS/[facility]) [N18.3] 12/20/2016   • Type 2 diabetes mellitus with ophthalmic complication (CMS/[facility]) [E11.39] 04/25/2016      Resolved Hospital Problems   No resolved problems to display.       Assessment & Plan    - Pleural effusion appears to be transudative.  Likely related to his heart failure.  Cytology pending.  - No hypoxia today and no respiratory distress.  - Received 3 doses of IV Lasix and appears euvolemic at this time.  Oral Lasix has been started  -  discussed with Dr. garcia of cardiology who has cleared him for discharge.  He will follow-up in one week for lab work  - MRI of the brain has been reported to show both acute and subacute infarcts.  Continue aspirin and statin.   Awaiting additional input from neurology.  EEG pending  - Renal function appears stable.  Continue to monitor.  - Glucose stable  - TSH is mildly elevated.  I will slightly increase the dose of his Synthroid.  - Discharge home today if okay with neurology      Alexey Haddad,  MD [facility]  12/03/18  12:41 PM

## 2018-12-03 NOTE — PAYOR COMM NOTE
"Carlito Sanchez (63 y.o. Male)     PLEASE SEE ATTACHED CLINICAL REVIEW. PT. WAS ADMITTED TO [facility] ON 11/30/18 TO A MONITORED TELEM FLOOR.     REF#[id number]    PLEASE CALL   OR  [phone] WITH INPT AUTH AND DAYS APPROVED. [facility] IS DRG WITH LOCAL [place] [facility].     THANK YOU    JERALD MCCALLUM LPN [facility]    Date of Birth Social Security Number Address Home Phone MRN    1955  [id number] [place] [phone] [id number]    [employer]abapt[employer] Marital Status          Nondenominational        Admission Date Admission Type Admitting Provider Attending Provider Department, Room/Bed    11/30/18 Emergency Alexey Haddad MD [place], Robert Russell, MD [address], [address]    Discharge Date Discharge Disposition Discharge Destination                       Attending Provider:  Alexey Haddad MD    Allergies:  Fluoxetine, Prozac [Fluoxetine Hcl]    Isolation:  None   Infection:  None   Code Status:  CPR    Ht:  182.9 cm (72\")   Wt:  79.8 kg (175 lb 14.4 oz)    Admission Cmt:  None   Principal Problem:  None                Active Insurance as of 11/30/2018     Primary Coverage     Payor Plan Insurance Group Employer/Plan Group    ANTHEM MEDICAID ANTHEM MEDICAID [id number]     Payor Plan Address Payor Plan Phone Number Payor Plan Fax Number Effective Dates    [address] [address] [phone]  7/1/2018 - None Entered    [facility] [id number]       Subscriber Name Subscriber Birth Date Member ID       CARLITO SANCHEZ 1955 [id number]                 Emergency Contacts      (Rel.) Home Phone Work Phone Mobile Phone    Lissette Sanchez (Spouse) [phone] -- [phone]               History & Physical      Alexey Haddad MD at 12/1/2018 10:03 AM          A Admission H&P    Patient Care Team:  Jyoti Reynolds MD as PCP - General (Internal Medicine)  Amber Murguia MD as Consulting Physician (Cardiology)    Chief complaint: Shortness " of breath    History of Present Illness    This is a 63-year-old male who is known to me from recent admission back in September when he had respiratory failure after colonoscopy and was found to have pneumonia.  He read presented to the emergency room last night at the past of his primary care physician after he went to the office complaining of dyspnea on exertion.  He had a CT scan of the chest that showed moderate size bilateral pleural effusions and possible right-sided pneumonia along with persistent mild mediastinal lymphadenopathy.  The patient was admitted overnight last night.  Today he states that he is mildly short of breath at rest but more so when he gets up and exerts himself.  He denies any cough or fever.  He states that his legs have been swelling over the past several weeks.  He has a history of noncompliance of medications and is unable to tell me if he has been taking his diuretics at home.  He denies any chest pain or palpitations.  X-ray in the emergency room showed a persistent right-sided pleural effusion.  He states that he has been feeling quite confused over the past several days but is currently awake, alert, and oriented ×3.    Past Medical History:   Diagnosis Date   • Acquired hypothyroidism    • Acute congestive heart failure (CMS/HCC)    • Acute respiratory failure with hypoxia (CMS/HCC)    • Acute sinusitis    • SYLVAIN (acute kidney injury) (CMS/HCC)     on CKD   • Anemia    • Arthritis    • Cancer (CMS/HCC)     skin   • Chronic ischemic heart disease    • CKD (chronic kidney disease)    • Depression    • Diabetes mellitus type 2, uncontrolled, without complications (CMS/HCC)    • Disease of thyroid gland    • Fatigue    • Heart attack (CMS/HCC)    • History of coronary artery disease     with remote history of bypass surgery in 2001   • History of transfusion    • Hyperglycemia    • Hyperlipidemia    • Hypertension    • Hypertensive encephalopathy    • Mental status change     Acute  "  • Pneumonia    • RBBB (right bundle branch block)    • Screening for prostate cancer 2011   • Stroke (CMS/HCC)    • TIA (transient ischemic attack)    • Type 2 diabetes mellitus (CMS/HCC)    • Urinary retention    • Vitamin D deficiency      Past Surgical History:   Procedure Laterality Date   • APPENDECTOMY N/A 02/14/2016    Dr. Alexey Dodson   • COLONOSCOPY      over 20 years ago.  no polyps    • CORONARY ARTERY BYPASS GRAFT  11/2001   • TONSILLECTOMY     • VASECTOMY       Family History   Problem Relation Age of Onset   • Diabetes Mother    • Hypertension Mother    • Macular degeneration Mother    • Alcohol abuse Father    • Cancer Father         lung   • Alcohol abuse Brother      Social History     Tobacco Use   • Smoking status: Never Smoker   • Smokeless tobacco: Never Used   • Tobacco comment: daily caffiene   Substance Use Topics   • Alcohol use: No   • Drug use: No     Medications Prior to Admission   Medication Sig Dispense Refill Last Dose   • amLODIPine (NORVASC) 5 MG tablet Take 1 tablet by mouth Daily for 180 days. 90 tablet 1 Taking   • aspirin 81 MG EC tablet Take 1 tablet by mouth Daily for 180 days. 90 tablet 1 Taking   • atorvastatin (LIPITOR) 40 MG tablet Take 1 tablet by mouth Every Night for 180 days. 90 tablet 1 Taking   • buPROPion XL (WELLBUTRIN XL) 150 MG 24 hr tablet Take 1 tablet by mouth Daily for 180 days. 90 tablet 1 Taking   • clopidogrel (PLAVIX) 75 MG tablet Take 1 tablet by mouth Daily for 180 days. 90 tablet 1 Taking   • docusate sodium (COLACE) 100 MG capsule Take 1 capsule by mouth 2 (two) times a day. (Patient taking differently: Take 100 mg by mouth As Needed for Constipation.) 30 capsule 0 Taking   • insulin glulisine (APIDRA) 100 UNIT/ML injection Inject  under the skin into the appropriate area as directed 3 (Three) Times a Day Before Meals. Sliding scale    Pt states \"I rarely take it, maybe once a week. I just don't like sticking my self.\"   Patient Taking " Differently at Unknown time   • Insulin Pen Needle (B-D ULTRAFINE III SHORT PEN) 31G X 8 MM misc Use to inject 4x daily 150 each 5 Taking   • levothyroxine (SYNTHROID) 50 MCG tablet Take 1 tablet by mouth Daily. 30 tablet 5 Taking   • metoprolol tartrate (LOPRESSOR) 100 MG tablet Take 1 tablet by mouth 2 (Two) Times a Day for 180 days. 180 tablet 1 Taking   • RELION GLUCOSE TEST STRIPS test strip 1 each by Other route 2 (two) times a day. Use as instructed   Taking   • vitamin D (ERGOCALCIFEROL) 78659 units capsule capsule Take 1 cap 3 times weekly 24 capsule 1 Taking     Allergies:  Fluoxetine and Prozac [fluoxetine hcl]    Review of Systems   Constitutional: Negative for chills and fever.   HENT: Negative for congestion and sore throat.    Eyes: Negative for visual disturbance.   Respiratory: Positive for shortness of breath. Negative for cough, chest tightness and wheezing.    Cardiovascular: Positive for leg swelling. Negative for chest pain and palpitations.   Gastrointestinal: Negative for abdominal distention, abdominal pain, diarrhea, nausea and vomiting.   Endocrine: Negative for polydipsia and polyuria.   Genitourinary: Negative for difficulty urinating, dysuria, frequency and urgency.   Musculoskeletal: Negative for arthralgias and myalgias.   Skin: Negative for color change and rash.   Neurological: Negative for dizziness and light-headedness.   Psychiatric/Behavioral: Positive for confusion.        PHYSICAL EXAM    Vital Signs  tMax 24 hrs:  Temp (24hrs), Av.7 °F (36.5 °C), Min:97.5 °F (36.4 °C), Max:98.1 °F (36.7 °C)    Vitals Ranges:  Temp:  [97.5 °F (36.4 °C)-98.1 °F (36.7 °C)] 97.5 °F (36.4 °C)  Heart Rate:  [57-65] 62  Resp:  [16-20] 16  BP: (134-155)/(71-95) 150/75    Physical Exam   Constitutional: He is oriented to person, place, and time. He appears well-developed and well-nourished.   HENT:   Head: Normocephalic and atraumatic.   Eyes: EOM are normal. Pupils are equal, round, and reactive  to light.   Neck: Neck supple. No tracheal deviation present.   Cardiovascular: Normal rate and regular rhythm. Exam reveals no gallop.   No murmur heard.  Pulmonary/Chest: Effort normal. No respiratory distress. He has no wheezes.   Decreased breath sounds bilaterally, right greater than left   Abdominal: Soft. Bowel sounds are normal. He exhibits no distension. There is no tenderness.   Musculoskeletal: He exhibits edema. He exhibits no tenderness.   2+ bilateral pitting lower extremity edema   Neurological: He is alert and oriented to person, place, and time. No cranial nerve deficit.   Skin: Skin is warm and dry.   Nursing note and vitals reviewed.        Active Hospital Problems    Diagnosis Date Noted   • Pleural effusion [J90] 12/01/2018   • Acute on chronic combined systolic and diastolic congestive heart failure (CMS/[facility]) [I50.43] 09/21/2018   • Cerebrovascular accident (CVA) due to embolism of precerebral artery (CMS/[facility]) [I63.10] 02/20/2018   • Acquired hypothyroidism [E03.9] 12/20/2017   • Essential hypertension [I10] 12/20/2016   • CKD (chronic kidney disease) stage 3, GFR 30-59 ml/min (CMS/[facility]) [N18.3] 12/20/2016   • Type 2 diabetes mellitus with ophthalmic complication (CMS/[facility]) [E11.39] 04/25/2016      Resolved Hospital Problems   No resolved problems to display.       Assessment & Plan    The patient was admitted overnight last night.  He has persistent pleural effusion based upon imaging and has never had a thoracentesis before.  Therefore, we will go ahead and proceed with this with appropriate studies.  I do not think he has pneumonia.  This may be more heart failure exacerbation.  I will start him on IV diuretics.  I have discussed with Dr. García of pulmonology who was kind enough to assist in his care.  He will continue to have follow-up scans to monitor the mild mediastinal lymphadenopathy.  We will also ask cardiology to see him.  Neurology evaluated the patient this morning and is  "recommending MRI and EEG along with lab work.  We will monitor his renal function with diuresis.  Currently appears near his baseline.  Additional plans based on his clinical course.    I discussed the patients findings and my recommendations with patient, nursing staff and consulting provider    Alexey Haddad MD  12/01/18  10:03 AM              Electronically signed by Alexey Haddad MD at 12/1/2018 10:12 AM          Emergency Department Notes      Jerome Liu MD at 11/30/2018  7:50 PM           EMERGENCY DEPARTMENT ENCOUNTER    Room Number:  18/18  PCP: Jyoti Reynolds MD   Cardiologist: Dr. Murguia  Historian: Patient, Family  History Limited By: Patient is a vague historian       HPI  Chief Complaint: Dyspnea  Context: Carlito Mo is a 63 y.o. male with h/o stroke who presents to the ED c/o intermittent episodes of dyspnea onset about a week ago. Pt reports that his dyspnea worsens with exertion and improves with rest. Pt reports that he has also had intermittent episodes of sternal chest pain, confusion, and BLE edema. Pt denies documented fever, cough, nausea, vomiting, and abdominal pain. Pt states that he was seen at his PMD's office for his symptoms on 11/28/18 and had a CT Chest performed. Pt reports that earlier today, pt received a phone call from his PMD's office informing him that pt's CT Chest showed bilateral pleural effusions/pulmonary edema c/w CHF and possible right-sided pneumonia. Consequently, pt was referred to the ER for further evaluation. There are no other complaints at this time.       Location: Respiratory/cardiac  Radiation: N/A  Character: \"difficulty breathing\"  Duration: Onset about a week ago  Severity: Moderate  Progression: Waxing and waning  Aggravating Factors: Exertion  Alleviating Factors: Rest        MEDICAL RECORD REVIEW    Pt was seen at his PMD's office on 11/28/18 secondary to enlarged lymph nodes. Pt had a CT Chest [facility] at [facility] [facility] that showed:   1. Interval development of moderate to large bilateral pleural effusions as well as mild pulmonary edema.    2. Moderate right infrahilar atelectasis/infiltrate.    3. Mild mediastinal adenopathy, likely reactive. Follow-up contrast-enhanced CT examination can be performed in 3 months for surveillance      Pt's creatinine was 1.53 about a month ago. Pt's BNP was 2,863 about 2 months ago.           PAST MEDICAL HISTORY  Active Ambulatory Problems     Diagnosis Date Noted   • Major depressive disorder with single episode, in partial remission (CMS/[facility])    • Other hyperlipidemia    • Chronic ischemic heart disease    • Vitamin D deficiency 12/17/2015   • Erectile dysfunction 12/17/2015   • Chronic fatigue 04/25/2016   • Type 2 diabetes mellitus with ophthalmic complication (CMS/[facility]) 04/25/2016   • Skin lesion of chest wall 06/20/2016   • Slow transit constipation 09/01/2016   • Benign prostatic hyperplasia with nocturia 12/20/2016   • Chronic kidney disease 12/20/2016   • History of basal cell carcinoma 12/20/2016   • Essential hypertension 12/20/2016   • Acquired hypothyroidism 12/20/2017   • Cerebrovascular accident (CVA) due to embolism of precerebral artery (CMS/[facility]) 02/20/2018   • Urinary retention 09/20/2018   • SYLVAIN (acute kidney injury) (CMS/[facility]) 09/20/2018   • Pneumonia 09/21/2018   • Acute congestive heart failure (CMS/[facility]) 09/21/2018   • Acute respiratory failure with hypoxia (CMS/[facility]) 09/21/2018   • Suspected sleep apnea 10/29/2018     Resolved Ambulatory Problems     Diagnosis Date Noted   • Anemia    • Arthritis    • Diabetes mellitus out of control (CMS/[facility])    • Type 2 diabetes mellitus (CMS/[facility])    • Acute sinusitis    • Accumulation of fluid in tissues 12/17/2015   • Fatigue 12/17/2015   • Cardiomyopathy, ischemic 12/17/2015   • Acute appendicitis 03/02/2016   • Atherosclerosis of coronary artery 10/16/2012   • Altered mental status 11/30/2017   • Hypertensive urgency 02/16/2018    • Hypertensive encephalopathy syndrome 04/30/2018   • History of medication noncompliance 05/04/2018   • Hyperglycemia 05/04/2018   • Screening for colorectal cancer 08/22/2018   • Constipation 08/22/2018     Past Medical History:   Diagnosis Date   • Acquired hypothyroidism    • Acute congestive heart failure (CMS/HCC)    • Acute respiratory failure with hypoxia (CMS/HCC)    • Acute sinusitis    • SYLVAIN (acute kidney injury) (CMS/HCC)    • Anemia    • Arthritis    • Cancer (CMS/HCC)    • Chronic ischemic heart disease    • CKD (chronic kidney disease)    • Depression    • Diabetes mellitus type 2, uncontrolled, without complications (CMS/HCC)    • Disease of thyroid gland    • Fatigue    • Heart attack (CMS/HCC)    • History of coronary artery disease    • History of transfusion    • Hyperglycemia    • Hyperlipidemia    • Hypertension    • Hypertensive encephalopathy    • Mental status change    • Pneumonia    • RBBB (right bundle branch block)    • Screening for prostate cancer 2011   • Stroke (CMS/HCC)    • TIA (transient ischemic attack)    • Type 2 diabetes mellitus (CMS/HCC)    • Urinary retention    • Vitamin D deficiency          PAST SURGICAL HISTORY  Past Surgical History:   Procedure Laterality Date   • APPENDECTOMY N/A 02/14/2016    Dr. Alexey Dodson   • COLONOSCOPY      over 20 years ago.  no polyps    • CORONARY ARTERY BYPASS GRAFT  11/2001   • TONSILLECTOMY     • VASECTOMY           FAMILY HISTORY  Family History   Problem Relation Age of Onset   • Diabetes Mother    • Hypertension Mother    • Macular degeneration Mother    • Alcohol abuse Father    • Cancer Father         lung   • Alcohol abuse Brother          SOCIAL HISTORY  Social History     Socioeconomic History   • Marital status:      Spouse name: Not on file   • Number of children: Not on file   • Years of education: Not on file   • Highest education level: Not on file   Social Needs   • Financial resource strain: Not on file   •  Food insecurity - worry: Not on file   • Food insecurity - inability: Not on file   • Transportation needs - medical: Not on file   • Transportation needs - non-medical: Not on file   Occupational History   • Not on file   Tobacco Use   • Smoking status: Never Smoker   • Smokeless tobacco: Never Used   • Tobacco comment: daily caffiene   Substance and Sexual Activity   • Alcohol use: No   • Drug use: No   • Sexual activity: Defer   Other Topics Concern   • Not on file   Social History Narrative   • Not on file         ALLERGIES  Fluoxetine and Prozac [fluoxetine hcl]        REVIEW OF SYSTEMS  Review of Systems   Unable to perform ROS: Other (pt is a vague historian)   Constitutional: Negative for fever.   Respiratory: Positive for shortness of breath. Negative for cough.    Cardiovascular: Positive for chest pain and leg swelling (BLE edema).   Gastrointestinal: Negative for abdominal pain, nausea and vomiting.   Psychiatric/Behavioral: Positive for confusion.            PHYSICAL EXAM  ED Triage Vitals   Temp Heart Rate Resp BP SpO2   11/30/18 1904 11/30/18 1904 11/30/18 1904 11/30/18 2000 11/30/18 1904   98.1 °F (36.7 °C) 62 20 149/83 96 %      Temp src Heart Rate Source Patient Position BP Location FiO2 (%)   11/30/18 1904 11/30/18 1904 -- -- --   Oral Monitor            Physical Exam   Constitutional: He is oriented to person, place, and time. No distress.   HENT:   Head: Normocephalic.   Mouth/Throat: Mucous membranes are normal.   Eyes: EOM are normal. Pupils are equal, round, and reactive to light.   Neck: Normal range of motion. Neck supple.   Cardiovascular: Normal rate, regular rhythm and normal heart sounds.   Pulmonary/Chest: Effort normal. No respiratory distress. He has decreased breath sounds (bilaterally). He has no wheezes. He has no rhonchi. He has rales (bilaterally).   Abdominal: Soft. There is no tenderness. There is no rebound and no guarding.   Musculoskeletal: Normal range of motion. He  exhibits edema (2+ BLE edema).   Neurological: He is alert and oriented to person, place, and time. He has normal sensation.   Pt is slow to answer questions, but answers the questions appropriately.    Skin: Skin is warm and dry.   Psychiatric: Mood and affect normal.   Nursing note and vitals reviewed.              PROCEDURES  Procedures      EKG:          EKG time: 20:01  Rhythm/Rate: Sinus bradycardia rate 53  P waves and MI: Normal P waves  QRS, axis: RBBB   ST and T waves: T wave inversions in V2, V3     Interpreted Contemporaneously by me, independently viewed  Similar compared to prior 09/22/18        MEDICATIONS GIVEN IN ER  Medications   dextrose (GLUTOSE) oral gel 15 g (not administered)   dextrose (D50W) 25 g/ 50mL Intravenous Solution 25 g (not administered)   glucagon (human recombinant) (GLUCAGEN DIAGNOSTIC) injection 1 mg (not administered)   sodium chloride 0.9 % flush 3 mL (not administered)   sodium chloride 0.9 % flush 1-10 mL (not administered)   acetaminophen (TYLENOL) tablet 650 mg (not administered)   insulin lispro (humaLOG) injection 0-9 Units (0 Units Subcutaneous Hold 11/30/18 2346)   furosemide (LASIX) injection 40 mg (40 mg Intravenous Given 11/30/18 2341)             PROGRESS AND CONSULTS  ED Course as of Dec 01 0000   Fri Nov 30, 2018   2304 11:04 PM  Patient here for confusion and CT showing CHF.  His doctor told him to come in immediately.  His confusion is intermittent.  CT head negative.  His CT chest and labs show that he is in CHF.  Given lasix.  Discussed with Dr. Corona and Ashley with A.  Will admit.  [SL]      ED Course User Index  [SL] Jerome Liu MD       8:07 PM:  Blood work, UA, CXR, CT Head, and EKG ordered for further evaluation.     9:06 PM:  Placed call to the cardiologist to discuss further course of care.     9:13 PM:  Rechecked pt. Pt is resting comfortably and appears in no acute distress. Informed pt and family that pt's BNP today is 2,555 and  creatinine is 1.82. Pt's CT Head shows chronic degenerative changes, but nothing acute otherwise. Pt and family were also informed of pt's CT Chest results from 11/28/18, suggesting that there has been development of bilateral pleural effusions/pulmonary edema. Will discuss further course of care with the cardiologist. However, need for pt's admission to the hospital for further evaluation and management/treatment. Pt and family agree with plan. Decision time to admit: Now.     9:22 PM:  Discussed the case with Dr. Corona, cardiologist. He would like pt admitted to the hospitalist and he will consult during the admission.     10:06 PM:  Placed call to [facility] for admission.     10:13 PM:  Discussed case with Ms. Ramirez, NP for Dr. Hogue, hospitalist. They will admit pt to a telemetry bed for further evaluation and management/treatment.     11:05 PM:  Lasix ordered to [facility] pt.    Pt reports that he is unable to provide a urine specimen at this time.       MEDICAL DECISION MAKING      MDM  Number of Diagnoses or Management Options     Amount and/or Complexity of Data Reviewed  Clinical lab tests: ordered and reviewed (BNP is 2,555.)  Tests in the radiology section of CPT®:  ordered and reviewed (CXR:  There is a moderate-sized subpulmonic right pleural effusion. A similar  sized effusion was present on the previous chest x-ray. This produces  compressive atelectasis of the right lower lobe. Superimposed right  lower lobe pneumonia cannot be excluded. The left lung appears free of  infiltrates. Only a very tiny left pleural effusion is evident. The  heart is normal in size. Stigmata of previous coronary artery bypass  procedure are evident.)  Tests in the medicine section of CPT®:  reviewed and ordered (EKG interpreted.   )  Discussion of test results with the performing providers: yes (CT Head results d/w radiologist.   )  Decide to obtain previous medical records or to obtain history from someone other than the  "patient: yes  Discuss the patient with other providers: yes (Discussed the case with Dr. Corona, cardiologist.   Discussed case with Ms. Ramirez, NP for Dr. Hogue, hospitalist.)    Patient Progress  Patient progress: stable             DIAGNOSIS  Final diagnoses:   Acute congestive heart failure, unspecified heart failure type (CMS/HCC)   Pleural effusion, bilateral   Confusion           DISPOSITION  Pt admitted to telemetry.    ADMISSION    Discussed treatment plan and reason for admission with pt/family and admitting physician.  Pt/family voiced understanding of the plan for admission for further testing/treatment as needed.         Latest Documented Vital Signs:  As of 11:46 PM  BP- 138/74 HR- 60 Temp- 98.1 °F (36.7 °C) (Oral) O2 sat- 94%        --  Documentation assistance provided by lida Mcelroy for Dr. Noe MD.  Information recorded by the scribe was done at my direction and has been verified and validated by me.       Luisito Mcelroy  12/01/18 0000       Jerome Liu MD  12/01/18 0149      Electronically signed by Jerome Liu MD at 12/1/2018  1:49 AM     Nida Schmitt RN at 11/30/2018  7:52 PM        Patient states, \"on Thursday my primary care ordered a CT scan and I got results today and he told me to come the ER, the scan showed I had pneumonia, CHF, and pulmonary edema. I went to my doctor because chance been confused, chest pain and swelling in my legs, its hard to concentrate.\"   C/o SOB, tired with exertion.   Denies n/v/d.  Cardiac hx, hx CVA.   Patient denies CP, SOB, or any other s/s at this time. Patient in NAD at this time, VSS, call light within reach, patient alert.        Nida Schmitt RN  11/30/18 1957      Electronically signed by Nida Schmitt RN at 11/30/2018  7:57 PM     Maegan Mcgill at 11/30/2018  8:31 PM        Pt has been notified of urine specimen.   Pt stated he urinated right before coming in      Maegan Mcgill  11/30/18 " 2032      Electronically signed by Maegan Mcgill at 11/30/2018  8:32 PM     Maegan Mcgill at 11/30/2018  9:49 PM        Pt encouraged to urinate.  Pt stated he is trying.     Maegan Mcgill  11/30/18 2150      Electronically signed by Maegan Mcgill at 11/30/2018  9:50 PM       Intake & Output (last 3 days)       11/30 0701 - 12/01 0700 12/01 0701 - 12/02 0700 12/02 0701 - 12/03 0700 12/03 0701 - 12/04 0700    P.O.  580      Total Intake(mL/kg)  580 (7.5)      Urine (mL/kg/hr) 1250 2475 (1.3)      Other  2000      Total Output 1250 4475      Net -1250 -3895              Urine Unmeasured Occurrence  1 x 1 x     Stool Unmeasured Occurrence   1 x         Lines, Drains & Airways    Active LDAs     Name:   Placement date:   Placement time:   Site:   Days:    Peripheral IV 11/30/18 2009 Right Antecubital   11/30/18 2009    Antecubital   2                Lab Results (all)     Procedure Component Value Units Date/Time    Non-gynecologic Cytology [[id number]] Collected:  12/01/18 1424    Specimen:  Body Fluid from Pleural Cavity Updated:  12/03/18 0731    Hemoglobin A1c [[id number]]  (Abnormal) Collected:  12/03/18 0449    Specimen:  Blood Updated:  12/03/18 0638     Hemoglobin A1C 8.80 %     Narrative:       Basic Metabolic Panel [[id number]]  (Abnormal) Collected:  12/03/18 0450    Specimen:  Blood Updated:  12/03/18 0553     Glucose 123 mg/dL      BUN 27 mg/dL      Creatinine 1.63 mg/dL      Sodium 139 mmol/L      Potassium 4.1 mmol/L      Chloride 100 mmol/L      CO2 27.0 mmol/L      Calcium 8.7 mg/dL      eGFR Non African Amer 43 mL/min/1.73      BUN/Creatinine Ratio 16.6     Anion Gap 12.0 mmol/L     Narrative:       GFR Normal >60  Chronic Kidney Disease <60  Kidney Failure <15    Lipid Panel [[id number]] Collected:  12/03/18 0450    Specimen:  Blood Updated:  12/03/18 0553     Total Cholesterol 124 mg/dL      Triglycerides 72 mg/dL      HDL Cholesterol 49 mg/dL      LDL Cholesterol  61 mg/dL       VLDL Cholesterol 14.4 mg/dL      LDL/HDL Ratio 1.24    Narrative:       CBC & Differential [[id number]] Collected:  12/03/18 0449    Specimen:  Blood Updated:  12/03/18 0543    Narrative:       CBC Auto Differential [[id number]]  (Abnormal) Collected:  12/03/18 0449    Specimen:  Blood Updated:  12/03/18 0543     WBC 7.38 10*3/mm3      RBC 4.15 10*6/mm3      Hemoglobin 10.4 g/dL      Hematocrit 33.9 %      MCV 81.7 fL      MCH 25.1 pg      MCHC 30.7 g/dL      RDW 13.7 %      RDW-SD 41.2 fl      MPV 10.4 fL      Platelets 252 10*3/mm3      Neutrophil % 63.1 %      Lymphocyte % 24.8 %      Monocyte % 8.5 %      Eosinophil % 3.5 %      Basophil % 0.1 %      Immature Grans % 0.0 %      Neutrophils, Absolute 4.65 10*3/mm3      Lymphocytes, Absolute 1.83 10*3/mm3      Monocytes, Absolute 0.63 10*3/mm3      Eosinophils, Absolute 0.26 10*3/mm3      Basophils, Absolute 0.01 10*3/mm3      Immature Grans, Absolute 0.00 10*3/mm3     POC Glucose Once [[id number]]  (Abnormal) Collected:  12/02/18 2203    Specimen:  Blood Updated:  12/02/18 2205     Glucose 144 mg/dL     T4, Free [[id number]]  (Normal) Collected:  12/02/18 0606    Specimen:  Blood Updated:  12/02/18 1424     Free T4 1.27 ng/dL     POC Glucose Once [[id number]]  (Abnormal) Collected:  12/02/18 1346    Specimen:  Blood Updated:  12/02/18 1347     Glucose 228 mg/dL     Body Fluid Culture - Body Fluid, Pleural Cavity [[id number]] Collected:  12/01/18 1424    Specimen:  Body Fluid from Pleural Cavity Updated:  12/02/18 0956     BF Culture No growth     Gram Stain No organisms seen      Rare (1+) WBCs seen    TSH [[id number]]  (Abnormal) Collected:  12/02/18 0606    Specimen:  Blood Updated:  12/02/18 0716     TSH 4.930 mIU/mL     Basic Metabolic Panel [[id number]]  (Abnormal) Collected:  12/02/18 0606    Specimen:  Blood Updated:  12/02/18 0707     Glucose 89 mg/dL      BUN 25 mg/dL      Creatinine 1.78 mg/dL      Sodium 141 mmol/L      Potassium 4.4 mmol/L      Chloride 103  mmol/L      CO2 27.2 mmol/L      Calcium 9.0 mg/dL      eGFR Non African Amer 39 mL/min/1.73      BUN/Creatinine Ratio 14.0     Anion Gap 10.8 mmol/L     Narrative:       GFR Normal >60  Chronic Kidney Disease <60  Kidney Failure <15    CBC & Differential [[id number]] Collected:  12/02/18 0606    Specimen:  Blood Updated:  12/02/18 0659    Narrative:       CBC Auto Differential [[id number]]  (Abnormal) Collected:  12/02/18 0606    Specimen:  Blood Updated:  12/02/18 0659     WBC 6.34 10*3/mm3      RBC 4.29 10*6/mm3      Hemoglobin 10.6 g/dL      Hematocrit 35.1 %      MCV 81.8 fL      MCH 24.7 pg      MCHC 30.2 g/dL      RDW 13.8 %      RDW-SD 41.2 fl      MPV 10.5 fL      Platelets 241 10*3/mm3      Neutrophil % 66.8 %      Lymphocyte % 20.7 %      Monocyte % 9.1 %      Eosinophil % 3.2 %      Basophil % 0.2 %      Immature Grans % 0.0 %      Neutrophils, Absolute 4.24 10*3/mm3      Lymphocytes, Absolute 1.31 10*3/mm3      Monocytes, Absolute 0.58 10*3/mm3      Eosinophils, Absolute 0.20 10*3/mm3      Basophils, Absolute 0.01 10*3/mm3      Immature Grans, Absolute 0.00 10*3/mm3     RPR Quant [[id number]] Collected:  12/02/18 0606    Specimen:  Blood Updated:  12/02/18 0634    POC Glucose Once [[id number]]  (Normal) Collected:  12/02/18 0620    Specimen:  Blood Updated:  12/02/18 0621     Glucose 88 mg/dL     POC Glucose Once [[id number]]  (Abnormal) Collected:  12/01/18 2025    Specimen:  Blood Updated:  12/01/18 2028     Glucose 238 mg/dL     Lactate Dehydrogenase, Body Fluid - Body Fluid, Pleural Cavity [[id number]] Collected:  12/01/18 1424    Specimen:  Body Fluid from Pleural Cavity Updated:  12/01/18 1759     Lactate Dehydrogenase (LD), Fluid 37 U/L     Narrative:       Body Fluid Cell Count With Differential - Body Fluid, Pleural Cavity [[id number]] Collected:  12/01/18 1424    Specimen:  Body Fluid from Pleural Cavity Updated:  12/01/18 1291    Narrative:       Body fluid differential - Body Fluid, Pleural Cavity  [[id number]] Collected:  12/01/18 1424    Specimen:  Body Fluid from Pleural Cavity Updated:  12/01/18 1745     Neutrophils, Fluid 9 %      Lymphocytes, Fluid 87 %      Mononuclear, Fluid 4 %     Protein, Body Fluid - Pleural Fluid, Pleural Cavity [[id number]] Collected:  12/01/18 1424    Specimen:  Pleural Fluid from Pleural Cavity Updated:  12/01/18 1734     Protein, Total, Fluid 1.3 g/dL     Narrative:       Glucose, Body Fluid - Pleural Fluid, Pleural Cavity [[id number]] Collected:  12/01/18 1424    Specimen:  Pleural Fluid from Pleural Cavity Updated:  12/01/18 1734     Glucose, Fluid 186 mg/dL     Narrative:       Body fluid cell count - Body Fluid, Pleural Cavity [[id number]]  (Abnormal) Collected:  12/01/18 1424    Specimen:  Body Fluid from Pleural Cavity Updated:  12/01/18 1718     Color, Fluid Yellow     Appearance, Fluid Hazy     WBC, Fluid 244 /mm3      RBC, Fluid 55 /mm3     pH, Body Fluid - Body Fluid, Pleural Cavity [[id number]] Collected:  12/01/18 1424    Specimen:  Body Fluid from Pleural Cavity Updated:  12/01/18 1718     pH, Fluid 7.57    Narrative:       Anaerobic Culture - Pleural Fluid, Pleural Cavity [[id number]] Collected:  12/01/18 1424    Specimen:  Pleural Fluid from Pleural Cavity Updated:  12/01/18 1618    POC Glucose Once [[id number]]  (Normal) Collected:  12/01/18 1601    Specimen:  Blood Updated:  12/01/18 1602     Glucose 130 mg/dL     Respiratory Panel, PCR - Swab, Nasopharynx [[id number]]  (Normal) Collected:  12/01/18 0950    Specimen:  Swab from Nasopharynx Updated:  12/01/18 1306     ADENOVIRUS, PCR Not Detected     Coronavirus 229E Not Detected     Coronavirus HKU1 Not Detected     Coronavirus NL63 Not Detected     Coronavirus OC43 Not Detected     Human Metapneumovirus Not Detected     Human Rhinovirus/Enterovirus Not Detected     Influenza B PCR Not Detected     Parainfluenza Virus 1 Not Detected     Parainfluenza Virus 2 Not Detected     Parainfluenza Virus 3 Not Detected      Parainfluenza Virus 4 Not Detected     Bordetella pertussis pcr Not Detected     Influenza A H1 2009 PCR Not Detected     Chlamydophila pneumoniae PCR Not Detected     Mycoplasma pneumo by PCR Not Detected     Influenza A PCR Not Detected     Influenza A H3 Not Detected     Influenza A H1 Not Detected     RSV, PCR Not Detected    POC Glucose Once [[id number]]  (Abnormal) Collected:  12/01/18 1055    Specimen:  Blood Updated:  12/01/18 1056     Glucose 230 mg/dL     Protime-INR [[id number]]  (Abnormal) Collected:  12/01/18 0945    Specimen:  Blood Updated:  12/01/18 1023     Protime 14.4 Seconds      INR 1.14    TSH [[id number]]  (Abnormal) Collected:  12/01/18 0907    Specimen:  Blood Updated:  12/01/18 0947     TSH 5.340 mIU/mL     Procalcitonin [[id number]]  (Abnormal) Collected:  12/01/18 0600    Specimen:  Blood Updated:  12/01/18 0946     Procalcitonin 0.03 ng/mL     Narrative:       Lactate Dehydrogenase [[id number]]  (Normal) Collected:  12/01/18 0907    Specimen:  Blood Updated:  12/01/18 0940      U/L     Uric Acid [[id number]]  (Normal) Collected:  12/01/18 0548    Specimen:  Blood Updated:  12/01/18 0915     Uric Acid 6.9 mg/dL     Troponin [[id number]]  (Abnormal) Collected:  12/01/18 0548    Specimen:  Blood Updated:  12/01/18 0721     Troponin T 0.048 ng/mL     Narrative:       Basic Metabolic Panel [[id number]]  (Abnormal) Collected:  12/01/18 0548    Specimen:  Blood Updated:  12/01/18 0641     Glucose 100 mg/dL      BUN 24 mg/dL      Creatinine 1.64 mg/dL      Sodium 141 mmol/L      Potassium 3.7 mmol/L      Chloride 106 mmol/L      CO2 23.1 mmol/L      Calcium 8.8 mg/dL      eGFR Non African Amer 43 mL/min/1.73      BUN/Creatinine Ratio 14.6     Anion Gap 11.9 mmol/L     Narrative:       GFR Normal >60  Chronic Kidney Disease <60  Kidney Failure <15    CBC (No Diff) [[id number]]  (Abnormal) Collected:  12/01/18 0548    Specimen:  Blood Updated:  12/01/18 0621     WBC 6.21 10*3/mm3      RBC 3.96  10*6/mm3      Hemoglobin 10.0 g/dL      Hematocrit 32.5 %      MCV 82.1 fL      MCH 25.3 pg      MCHC 30.8 g/dL      RDW 13.7 %      RDW-SD 41.6 fl      MPV 10.2 fL      Platelets 211 10*3/mm3     POC Glucose Once [[id number]]  (Normal) Collected:  12/01/18 0607    Specimen:  Blood Updated:  12/01/18 0608     Glucose 102 mg/dL     Troponin [[id number]]  (Abnormal) Collected:  12/01/18 0202    Specimen:  Blood Updated:  12/01/18 0248     Troponin T 0.042 ng/mL     Narrative:       POC Glucose Once [[id number]]  (Abnormal) Collected:  12/01/18 0121    Specimen:  Blood Updated:  12/01/18 0123     Glucose 178 mg/dL     Urinalysis With Microscopic If Indicated (No Culture) - Urine, Clean Catch [[id number]]  (Abnormal) Collected:  12/01/18 0046    Specimen:  Urine, Clean Catch Updated:  12/01/18 0056     Color, UA Yellow     Appearance, UA Clear     pH, UA 5.5     Specific Gravity, UA 1.014     Glucose, UA Negative     Ketones, UA Negative     Bilirubin, UA Negative     Blood, UA Negative     Protein,  mg/dL (2+)     Leuk Esterase, UA Negative     Nitrite, UA Negative     Urobilinogen, UA 0.2 E.U./dL    Urinalysis, Microscopic Only - Urine, Clean Catch [[id number]] Collected:  12/01/18 0046    Specimen:  Urine, Clean Catch Updated:  12/01/18 0056     RBC, UA 0-2 /HPF      WBC, UA 0-2 /HPF      Bacteria, UA None Seen /HPF      Squamous Epithelial Cells, UA 0-2 /HPF      Hyaline Casts, UA 0-2 /LPF      Methodology Automated Microscopy    Troponin [[id number]]  (Abnormal) Collected:  11/30/18 2008    Specimen:  Blood from Arm, Right Updated:  11/30/18 2040     Troponin T 0.047 ng/mL     Narrative:       Comprehensive Metabolic Panel [[id number]]  (Abnormal) Collected:  11/30/18 2008    Specimen:  Blood from Arm, Right Updated:  11/30/18 2038     Glucose 212 mg/dL      BUN 26 mg/dL      Creatinine 1.82 mg/dL      Sodium 143 mmol/L      Potassium 4.5 mmol/L      Chloride 108 mmol/L      CO2 25.2 mmol/L      Calcium 8.7 mg/dL       Total Protein 5.9 g/dL      Albumin 3.30 g/dL      ALT (SGPT) 19 U/L      AST (SGOT) 16 U/L      Alkaline Phosphatase 76 U/L      Total Bilirubin 0.3 mg/dL      eGFR Non African Amer 38 mL/min/1.73      Globulin 2.6 gm/dL      A/G Ratio 1.3 g/dL      BUN/Creatinine Ratio 14.3     Anion Gap 9.8 mmol/L     BNP [[id number]]  (Abnormal) Collected:  11/30/18 2008    Specimen:  Blood from Arm, Right Updated:  11/30/18 2036     proBNP 2,555.0 pg/mL     Narrative:       CBC & Differential [[id number]] Collected:  11/30/18 2008    Specimen:  Blood Updated:  11/30/18 2033    Narrative:       CBC Auto Differential [[id number]]  (Abnormal) Collected:  11/30/18 2008    Specimen:  Blood from Arm, Right Updated:  11/30/18 2033     WBC 6.98 10*3/mm3      RBC 3.96 10*6/mm3      Hemoglobin 10.1 g/dL      Hematocrit 32.4 %      MCV 81.8 fL      MCH 25.5 pg      MCHC 31.2 g/dL      RDW 13.8 %      RDW-SD 41.7 fl      MPV 10.4 fL      Platelets 224 10*3/mm3      Neutrophil % 66.7 %      Lymphocyte % 20.9 %      Monocyte % 9.2 %      Eosinophil % 3.2 %      Basophil % 0.0 %      Immature Grans % 0.0 %      Neutrophils, Absolute 4.66 10*3/mm3      Lymphocytes, Absolute 1.46 10*3/mm3      Monocytes, Absolute 0.64 10*3/mm3      Eosinophils, Absolute 0.22 10*3/mm3      Basophils, Absolute 0.00 10*3/mm3      Immature Grans, Absolute 0.00 10*3/mm3           Imaging Results (all)     Procedure Component Value Units Date/Time    XR Chest PA & Lateral [[id number]] Updated:  12/03/18 0757    MRI Brain Without Contrast [[id number]] Collected:  12/02/18 1434     Updated:  12/03/18 0640    Narrative:       MRI BRAIN WITHOUT CONTRAST     HISTORY: Confusion.     COMPARISON: CT head 11/30/2018.     TECHNIQUE: A MRI examination of the brain was performed utilizing  sagittal T1, axial diffusion, T1, T2, gradient echo T2, and T2 FLAIR  sequences.     FINDINGS: The diffusion sequence demonstrates an area of increased  signal intensity involving the  subcortical white matter of left frontal  lobe superiorly and posteriorly measuring 8 mm in diameter. No  convincing signal loss is noted on the ADC map. This does correspond to  an area of increased signal intensity on the T2 FLAIR sequence and may  represent a T2 shine through effect. This area of T2 hyperintensity was  not present on the MRI examination of 05/01/2018. An area of increased  signal intensity is noted involving the subcortical white matter of the  right frontal lobe superiolaterally measuring 8 mm in size on the  diffusion sequence as well. This does demonstrate signal loss on the ADC  map consistent with a small acute infarct.     There is expected flow-void in the basilar artery and in the distal  aspect of the internal carotid arteries bilaterally on the axial T2  sequence. There is moderate to severe small vessel ischemic disease and  multiple remote infarcts including multiple small infarcts involving the  cerebral hemispheres bilaterally, the malcolm to the right of midline,  lacunar infarcts involving the thalamus on the left, posterior limb of  the internal the right and posterior body of the caudate nucleus on the  left. There is no evidence of signal loss on the ADC map.     Multiple scalp nodules are appreciated.       Impression:       Extensive small vessel ischemic disease and multiple lacunar  infarcts. There is a small acute infarct involving the subcortical white  matter of the right frontal lobe superiolaterally. There is increased  signal intensity on the diffusion weighted sequence without convincing  signal loss on the ADC map involving of the superior aspect of the left  frontal lobe posteriorly as well as. This may represent a T2 shine  through effect or potentially a subacute infarct. It corresponds to an  area of T2 FLAIR and T2 hyperintensity which is new versus 05/01/2018.  The presence of potential infarction multiple vascular distributions  raise the possibility of a  proximal or cardiac source.     The above information was called to the patient's nurse at time of  dictation. The patient's nurse is to relay the information to the  clinical service.     This report was finalized on 12/3/2018 6:37 AM by Dr. Delmar Yuen M.D.        Thoracentesis [[id number]] Collected:  12/01/18 1615    Specimen:  Body Fluid Updated:  12/01/18 1627    Narrative:       ULTRASOUND-GUIDED RIGHT THORACENTESIS     HISTORY: Right pleural effusion. Shortness of breath.     TECHNIQUE: Following sterile prep and local anesthetic, a special  thoracentesis catheter was inserted into the right-sided pleural  effusion by Dr. Pearson. Then 2000 mL of pleural fluid was then  removed with suction technique. The patient tolerated the procedure well  and there were no immediate complications.     This report was finalized on 12/1/2018 4:24 PM by Dr. Nigel Pearson M.D.       CT Head Without Contrast [[id number]] Collected:  11/30/18 2044     Updated:  11/30/18 2057    Narrative:       CT HEAD WITHOUT CONTRAST     HISTORY: Confusion.     COMPARISON: CT head 9/20/2018.     FINDINGS: The brain and ventricles are symmetrical. There is no evidence  of intracranial hemorrhage, hydrocephalus or of abnormal extra-axial  fluid. Areas of decreased attenuation are appreciated involving the  white matter of the cerebral hemispheres bilaterally, nonspecific but  suggesting small vessel ischemic disease, moderate. The appearance is  similar to the prior examination.     Partially calcified scalp nodules are present in the left parietal  region, unchanged. A mucus retention cysts involving the right maxillary  sinus is noted.     Further evaluation could be performed with a MRI examination the brain  as indicated.     The above information was called to and discussed with Dr. Liu.           Radiation dose reduction techniques were utilized, including automated  exposure control and exposure modulation based on body  size.     This report was finalized on 11/30/2018 8:54 PM by Dr. Delmar Yuen M.D.       XR Chest 2 View [[id number]] Collected:  11/30/18 2043     Updated:  11/30/18 2050    Narrative:       PA AND LATERAL CHEST     CLINICAL HISTORY: Dyspnea     Compared to previous chest x-ray dated 9/19/2018.     There is a moderate-sized subpulmonic right pleural effusion. A similar  sized effusion was present on the previous chest x-ray. This produces  compressive atelectasis of the right lower lobe. Superimposed right  lower lobe pneumonia cannot be excluded. The left lung appears free of  infiltrates. Only a very tiny left pleural effusion is evident. The  heart is normal in size. Stigmata of previous coronary artery bypass  procedure are evident.     This report was finalized on 11/30/2018 8:47 PM by Dr. Javad Mckee M.D.             ECG/EMG Results (all)     Procedure Component Value Units Date/Time    ECG 12 Lead [[id number]] Collected:  11/30/18 2001     Updated:  12/01/18 1343    Narrative:       RR Interval= 1053 ms  AR Interval= 195 ms  QRSD Interval= 139 ms  QT Interval= 475 ms  QTc Interval= 463 ms  Heart Rate= 57 ms  P Axis= 4 deg  QRS Axis= -66 deg  T Wave Axis= 63 deg  I: 40 Axis= 58 deg  T: 40 Axis= 174 deg  ST Axis= 90 deg  Sinus rhythm  Right bundle branch block  Anteroseptal infarct, age indeterminate  No Change from Prior Tracing  Electronically Signed by:  [facility] 01-Dec-2018 13:41:41  Date and Time of Study: 2018-11-30 20:01:45    Adult Transthoracic Echo Complete W/ Cont if Necessary Per Protocol [[id number]] Collected:  12/02/18 1225     Updated:  12/02/18 1538    Narrative:       · There is severe hypokinesis of the distal anteroseptum and the anterior   apex.  · Left ventricular systolic function is mildly decreased. Calculated EF =   43%  · Normal right ventricular systolic function noted. Right ventricular   cavity is mildly dilated.  · Left atrial cavity size is dilated.  · The  mitral valve leaflets are thickened and myxomatous. There is mild   bileaflet mitral valve prolapse.  · Mild mitral valve regurgitation is present  · There is a trivial pericardial effusion.             Orders (all)     Start     Ordered    12/02/18 1600  Neuro Checks  Every 4 Hours      12/02/18 1341    12/02/18 1342  NIHSS Assessment  Every Shift     Comments:  Turn off all sedation medications prior to performing assessment. Assessment to be performed upon admission, transfer to another unit, discharge, and with neurological decline. If NIHSS change is greater than or equal to 4 and/or neurological decline is noted notify physician.    12/02/18 1341    12/02/18 1332  Transfer Patient  Once      12/02/18 1332    12/01/18 0855  Inpatient Pulmonology Consult  Once     Specialty:  Pulmonary Disease  Provider:  Jean-Paul García MD    12/01/18 0855    12/01/18 0834  Diet Regular; Cardiac, Consistent Carbohydrate, Low Sodium  Diet Effective Now      12/01/18 0833    12/01/18 0700  POC Glucose 4x Daily AC & at Bedtime  4 Times Daily Before Meals & at Bedtime      11/30/18 2217    12/01/18 0200  Troponin  Every 6 Hours      11/30/18 2217    12/01/18 0000  Vital Signs  Every 4 Hours      11/30/18 2217    11/30/18 2310  Inpatient Admission  Once      11/30/18 2309    11/30/18 2216  Place Sequential Compression Device  Once      11/30/18 2217    11/30/18 2216  Maintain Sequential Compression Device  Continuous      11/30/18 2217    11/30/18 2216  Diet Regular; Cardiac, Consistent Carbohydrate  Diet Effective Now,   Status:  Canceled      11/30/18 2217    11/30/18 2216  Inpatient Cardiology Consult  Once     Specialty:  Cardiology  Provider:  Tapan Jean Baptiste III, MD    11/30/18 2217 11/30/18 2216  Inpatient Neurology Consult General  Once     Specialty:  Neurology  Provider:  Klever Sahni MD    11/30/18 2217 11/30/18 2215  sodium chloride 0.9 % flush 1-10 mL  As Needed      11/30/18 2217 11/30/18 2215  Intake  & Output  Every Shift      11/30/18 2217 11/30/18 2215  Weigh Patient  Once      11/30/18 2217 11/30/18 2215  Do NOT Hold Basal Insulin When Patient is NPO, Hold Bolus Dose if NPO  Continuous      11/30/18 2217 11/30/18 2215  Follow [facility] Hypoglycemia Protocol For Blood Glucose Less Than 70 mg/dL  Until Discontinued      11/30/18 2217 11/30/18 2215  dextrose (GLUTOSE) oral gel 15 g  Every 15 Minutes PRN      11/30/18 2217 11/30/18 2215  dextrose (D50W) 25 g/ 50mL Intravenous Solution 25 g  Every 15 Minutes PRN      11/30/18 2217 11/30/18 2215  glucagon (human recombinant) (GLUCAGEN DIAGNOSTIC) injection 1 mg  As Needed      11/30/18 2217 11/30/18 2215  Hypoglycemia Treatment - Alert Patient That is Not NPO and Can Safely Swallow  Until Discontinued     Comments:  Administer 4 oz Fruit Juice OR 4 oz Regular Soda OR 8 oz Milk OR 15-30 grams (1 tube) of Glucose Gel.  Recheck Blood Glucose 15 Minutes After Ingestion, Repeat Treatment & Continue to Recheck Blood Sugar Every 15 Minutes Until Blood Glucose is 70 mg/dL or Higher.  Once Blood Glucose is 70 mg/dL or Higher and if It Will Be more than 60 Minutes Until the Next Meal, Provide Appropriate Snack (Including Carbohydrate Food) Based on Meal Plan Order. Give Meal Tray As Soon As Possible.    11/30/18 2217 11/30/18 2215  Hypoglycemia Treatment - Patient Has IV Access - Unresponsive, NPO or Unable To Safely Swallow  Until Discontinued     Comments:  Administer 25g (50ml) D50W IV Push.  Recheck Blood Glucose 15 Minutes After Administration, if Blood Glucose Remains Less Than 70 mg/dl, Repeat Treatment   Recheck Blood Glucose 15 Minutes After 2nd Administration, if Blood Glucose Remains Less Than 70 mg/dL After 2nd Dose of D50W, Contact Provider for Further Treatment Orders & Consider Adding IVF With D5W for Maintenance    11/30/18 2217 11/30/18 2215  Hypoglycemia Treatment - Patient Without IV Access - Unresponsive, NPO or Unable To Safely  Swallow  Until Discontinued     Comments:  Administer 1mg Glucagon SQ & Establish IV Access.  Turn Patient on Side - Nausea / Vomiting May Occur.  Recheck Blood Glucose 15 Minutes After Administration.  If Blood Glucose Remains Less Than 70, Administer 25g D50W IV Push (50ml).  Recheck Blood Glucose 15 Minutes After Administration of D50W, if Blood Glucose Remains Less Than 70 mg/dl, Contact Provider for Further Treatment Orders & Consider Adding IVF With D5 for Maintenance    11/30/18 2217 11/30/18 2207  [facility] (on-call MD unless specified)  Once     Specialty:  Internal Medicine  Provider:  (Not yet assigned)    11/30/18 2206 11/30/18 2107  [facility] (on-call MD unless specified)  Once     Specialty:  Cardiology  Provider:  Mauricio Corona MD    11/30/18 2106    Signed and Held  Catheter Irrigation  As Needed      Signed and Held             Physician Progress Notes (all)      Mauricio Corona MD at 12/2/2018  3:42 PM             LOS: 2 days   Patient Care Team:  Jyoti Reynolds MD as PCP - General (Internal Medicine)  Amber Murguia MD as Consulting Physician (Cardiology)    Chief Complaint: Follow-up for acute on chronic diastolic CHF, bilateral pleural effusions, CAD status post CABG    Interval History: Found to have multiple infarcts on MRI of brain, including a small right frontal lobe infarct.  Echo with EF 43%.  Had 2 liters drained off the right lung.  SOA slightly better.  No CP.      Vital Signs:  Temp:  [97.4 °F (36.3 °C)-98.2 °F (36.8 °C)] 97.4 °F (36.3 °C)  Heart Rate:  [54-60] 55  Resp:  [16-18] 16  BP: (126-150)/(66-81) 146/81    Intake/Output Summary (Last 24 hours) at 12/2/2018 1543  Last data filed at 12/2/2018 0341  Gross per 24 hour   Intake 340 ml   Output 1475 ml   Net -1135 ml       Physical Exam:   General Appearance:    No acute distress, alert and oriented x4   Lungs:     Clear to auscultation bilaterally     Heart:    Regular rhythm and normal rate.  II/VI SM throughout.  "  Abdomen:     Soft, non-tender, non-distended.    Extremities:   1-2+ edema          I reviewed the patient's new clinical results.        Assessment:  1. Acute on chronic combined CHF - EF 43%  2. Bilateral pleural effusions - status post thoracentesis on right on 18 (2 liters)  3. Intermittent confusion and altered mental status  4. CAD status post CABG in   5. Multiple small CVAs 2017.  Small acute infarct right frontal lobe/  6. Acute kidney injury with CKD  7. Chronically elevated troponin   8. Mediastinal lymphadenopathy of CT scan   9. Diabetes   10. Chronic anemia     Plan:  -Small acute CVA noted on MRI of brain.  No obvious embolic source on echo.  If NEISHA is needed, this will need to wait until he is better from a CHF standpoint.  Neuro following.  -2 liters drained from right lung today.  -Continue Lasix 40 mg IV bid.  Watch renal function.  -ASA and Plavix given CAD  -Not on ACEI secondary to renal function.    Mauricio Corona MD  18  3:43 PM        Electronically signed by Mauricio Corona MD at 2018  4:00 PM     Alexey Haddad MD at 2018  1:35 PM           LOS: 2 days     Name: Carlito Mo  Age: 63 y.o.  Sex: male  :  1955  MRN: [id number]         Primary Care Physician: Jyoti Reynolds MD    Subjective   Subjective  No new complaints at present.  Denies shortness of breath or chest pain.  Feels better after having thoracentesis yesterday.    Objective   Vital Signs  Temp:  [97.8 °F (36.6 °C)-98.2 °F (36.8 °C)] 97.9 °F (36.6 °C)  Heart Rate:  [54-60] 60  Resp:  [16-18] 16  BP: (126-150)/(66-78) 150/77  Body mass index is 23.19 kg/m².    Objective:  General Appearance:  Comfortable and in no acute distress.    Vital signs: (most recent): Blood pressure 150/77, pulse 60, temperature 97.9 °F (36.6 °C), temperature source Oral, resp. rate 16, height 182.9 cm (72\"), weight 77.6 kg (171 lb), SpO2 98 %.    Lungs:  Normal effort and normal " respiratory rate.  There are decreased breath sounds.  (Decreased breath sounds at the bilateral bases)  Heart: Normal rate.  Regular rhythm.    Abdomen: Abdomen is soft.  Bowel sounds are normal.   There is no abdominal tenderness.     Extremities: There is no dependent edema or local swelling.    Neurological: Patient is alert and oriented to person, place and time.    Skin:  Warm and dry.            Assessment/Plan   Active Hospital Problems    Diagnosis Date Noted   • Pleural effusion [J90] 12/01/2018   • Acute on chronic combined systolic and diastolic congestive heart failure (CMS/[facility]) [I50.43] 09/21/2018   • Cerebrovascular accident (CVA) due to embolism of precerebral artery (CMS/[facility]) [I63.10] 02/20/2018   • Acquired hypothyroidism [E03.9] 12/20/2017   • Essential hypertension [I10] 12/20/2016   • CKD (chronic kidney disease) stage 3, GFR 30-59 ml/min (CMS/HCC) [N18.3] 12/20/2016   • Type 2 diabetes mellitus with ophthalmic complication (CMS/HCC) [E11.39] 04/25/2016      Resolved Hospital Problems   No resolved problems to display.       Assessment & Plan    - Pleural effusion appears to be transudative.  Likely related to his heart failure.  Cytology pending.  - No hypoxia today and no respiratory distress.  - Received 3 doses of IV Lasix and appears euvolemic at this time.  - Echocardiogram has been ordered with results pending.  He'll uses to help determine his medication regimen moving forward and whether he needs cardiac catheterization.  Cardiology following.  - MRI of the brain has been reported to show both acute and subacute infarcts.  This was phoned to the nurse just a little while ago with official report pending.  Continue aspirin and statin.  Additional workup per neurology.  EEG pending  - I will go ahead and transfer him to a neuro floor  - Renal function appears stable.  Continue to monitor.  - Glucose stable  - TSH is mildly elevated.  I will slightly increase the dose of his  "Synthroid.    Alexey Haddad MD  [facility]  12/02/18  1:35 PM    Electronically signed by Alexey Haddad MD at 12/2/2018  1:41 PM     Erich García MD at 12/2/2018  1:07 PM          [facility]     Mar/chart reviewed  F/u pleural effusion  Pt not in room    Vital Sign Min/Max for last 24 hours  Temp  Min: 97.8 °F (36.6 °C)  Max: 98.2 °F (36.8 °C)   BP  Min: 126/78  Max: 150/77   Pulse  Min: 54  Max: 60   Resp  Min: 16  Max: 18   SpO2  Min: 96 %  Max: 100 %   No Data Recorded   Weight  Min: 77.6 kg (171 lb)  Max: 77.6 kg (171 lb 1.2 oz)     Exam deferred off floor    Labs:  Total protein 1.3  ldh 3.7  Glucose 186    A/P:  1. Acute on chronic systolic chf -- agree with diuresis  2. Pleural effusions -- clearly transudate  3. Mediastinal lymphadenopathy -- mild, likely reactive, would recommend repeat ct chest in 4 months time  4. CKD  5. Anemia  6. Elevated troponin -- per cards  7. H/o CVA        Electronically signed by Erich García MD at 12/2/2018  1:09 PM          Consult Notes (all)      Jonatan Marie MD at 12/1/2018  9:26 AM          CC:AMS    HPI:  Carlito Mo is a  63 y.o.  left-handed white male who we are asked to see in neurologic consultation by Ashley Campbell NP with altered mental status.  The patient was admitted yesterday with an approximate two-week history of feeling as though he was \"in a fog is \"as well as some recent falls.  Patient was having shortness of breath or chest pain and was found to have a large right pleural effusion/infiltrate lower lobe and pulmonary edema on chest CT ordered by his primary physician and he was sent for admission through the emergency room.  A head CT was obtained which shows some chronic white matter changes and some encephalomalacia/arachnoid cyst involving the left cerebellar hemisphere.  No acute changes are seen.  There were no changes compared to a previous head CT 9/20/18.    Patient " describes some balance trouble and some postural lightheadedness.  He describes numbness tingling and burning in his feet recently also.  He has a 10-15 year history of diabetes.  He also has hyperlipidemia, hypertension, hypothyroidism, chronic kidney disease, coronary artery disease status post MI 2011.    Laboratory evaluation showed troponin levels were elevated but his creatinine is also elevated at 1.64.  His CBC was normal.  Urinalysis was negative.  Blood sugar 100.  He describes some additional symptoms such as chills but no fevers and no cough at all.  He describes a minor dull headache.    Background symptoms include depression which she states is not any better as well as daytime drowsiness and knowledge of nocturnal snoring.  He states he has been told at least a couple times of the suspicion he has sleep apnea.    He denies history of significant head trauma meningitis seizures or syncope.  He states that he had a stroke in January of this year and that it caused memory trouble.  He denies that it caused any one-sided symptoms such as weakness numbness tingling visual change or speech or language disturbance.  He states he has been having some trouble with his vision which is progressively getting worse but he has not yet seen his eye doctor regarding this.  He also indicates that he does not smoke or drink alcoholic beverages.     Patient indicates his wife is currently in rehabilitation for a leg fracture.      Pain Scale: 1/10        ROS:  Review of Systems   Constitutional: Negative for activity change, appetite change .  Positive for fatigue.   HENT: Negative for rhinorrhea and trouble swallowing. Negative for ear pain, facial swelling and hearing loss.    Eyes: Positive for visual disturbance. Negative for pain, redness and itching.   Respiratory: Negative for cough, choking.  Positive for shortness of breath.    Cardiovascular: positive for chest pain and leg swelling.   Gastrointestinal:  Negative for abdominal pain, nausea and vomiting.   Endocrine: Negative for cold intolerance and heat intolerance.   Musculoskeletal: Positive for gait problem.   Skin: Positive for bruise right lateral thigh for falling on his wife's wheelchair.  Out of car.    Allergic/Immunologic: Negative for environmental allergies. Negative for food allergies.   Neurological: Negative for tremors.  Positive for numbness in the feet.  Positive for orthostatic dizziness negative for seizures, syncope, facial asymmetry, speech difficulty,   Hematological: Negative for adenopathy. Does not bruise/bleed easily.   Psychiatric/Behavioral: Negative for agitation, behavioral problems.  Positive for confusion, decreased concentration, dysphoric mood, positive for sleep disturbance . The patient is not nervous/anxious and is not hyperactive.            Physical Exam:  Vitals:    12/01/18 0210 12/01/18 0410 12/01/18 0546 12/01/18 0733   BP:  134/71  150/75   BP Location:  Left arm  Left arm   Patient Position:  Lying  Lying   Pulse: 58 57  62   Resp:  18  16   Temp:  97.5 °F (36.4 °C)  97.5 °F (36.4 °C)   TempSrc:  Oral  Oral   SpO2:  95%  95%   Weight:   81.7 kg (180 lb 1.9 oz)    Height:         Supine blood pressure 160/90; standing blood pressure 140/70 and delayed 3 minutes 144/70.  This was in the left arm.    Body mass index is 24.43 kg/m².    Physical Exam  Gen.: Well-developed white male no acute distress  HEENT: Normocephalic no evidence of trauma.  Discs are poorly seen due to cataracts.  Throat negative  Neck: Supple.  No thyromegaly.  No cervical bruits.  Radial pulses were strong and simultaneous.  Heart: Regular rate and rhythm with a grade 1 to 2/6 systolic murmur.  Pedal edema present more in the left leg and (chronic after vein stripping for his bypass surgery)      Neurological Exam:   Mental status: Patient is awake alert oriented conversant provides a good history with evidence of depressed affect.  There is  frequently a slight delay in his responses.  HCF: No aphasia or apraxia or dysarthria.  Recent and remote memory intact.  Knowledge of recent events intact.  Mini-Mental state score was 30/30 and his clock draw was 4/4.  Cranial nerves: 2-12 intact  Motor: Normal tone and bulk.  There is mild weakness of intrinsic hand muscles and toe extension and an flexion area all other muscles tested in the arms and legs were 5/5.  Reflexes: Trace to +1 in the arms +2 at the knees.  Downgoing toe signs  Sensory: Reduced light touch and pinprick in the feet.  Light touch was also reduced just above the ankles.  Vibration sense was reduced at the ankles and position sense was intact in the great toes.  Cerebellar: Finger to nose rapid movement heel-to-shin normal  Gait and Station: Mildly broad-based.  Mildly unsteady.  He states he is mildly lightheaded.  No Romberg no drift        Lab Results   Component Value Date    TSH 5.490 (H) 12/18/2017    E3BZTIF 7.2 12/18/2017    THYROIDAB 12 08/20/2015         Lab Results   Component Value Date    GZTVOZOK73 388 12/02/2017         No results found for: FOLATE      Lab Results   Component Value Date    HGBA1C 10.60 (H) 05/02/2018       CT brain films reviewed as noted above      Assessment:   1.  Altered mental status-suspect combination of depression, sleep apnea with excessive daytime somnolence and medical conditions such as his heart failure to be origin of this.  He has a normal Mini-Mental state and clock draw.  2.  Abnormal CT scan of the brain-small vessel changes seen on head CT.  No change since previous study.  3.  Diabetic polyneuropathy due to poorly controlled diabetes which appears moderate in severity.  4.  Orthostatic hypotension most likely due to his diabetic neuropathy and may be exacerbated by diuretics and antihypertensives.  5.  Excessive daytime drowsiness-most likely associated with depression and sleep apnea          Plan:  1.  Agree with MRI of the brain and  "EEG  2.  Labs including sedimentation rate, RPR, B12 and folate, thyroid functions  3.  Recommend target control of his vascular risk factors and aspirin 81 mg daily  4.  Recommend outpatient evaluation of sleep apnea and continued efforts to treat his depression which appears to be treatment resistant.                            .    Electronically signed by Jonatan Marie MD at 12/1/2018  9:57 AM     Erich García MD at 12/1/2018  9:16 AM      Consult Orders    1. Inpatient Pulmonology Consult [[id number]] ordered by Alexey Haddad MD at 12/01/18 0855                [facility]    Reason for consult: lad, pleural effusion, possible pna    HPI:  Mr. Mo is a 64yo WM with a history of chronic systolic chf.  He presented to the Emergency room last night with a history of intermittent shortness of breath for about a week now.  It is worse with exertion and better with rest. Symptoms moderate.   A CT chest was ordered and done at a [place] facility, I can see the report in Care everywhere desrcribing bilateral effusions and infiltrates, this was done on 11/28/2018.  He had some \"mild mediastinal lad\" as well with largest measured value listed being 1.2cm.  CXR here actually looks improved from the last one in our system and the left lung look pretty clear to me.  MEDS: reviewed   ALL: prozac  ROS:  Unable to perform ROS: Other (pt is a vague historian)   Constitutional: Negative for fever.   Respiratory: Positive for shortness of breath. Negative for cough.    Cardiovascular: Positive for chest pain and leg swelling (BLE edema).   Gastrointestinal: Negative for abdominal pain, nausea and vomiting.   Psychiatric/Behavioral: Positive for confusion.     Vital Sign Min/Max for last 24 hours  Temp  Min: 97.5 °F (36.4 °C)  Max: 98.1 °F (36.7 °C)   BP  Min: 134/71  Max: 155/95   Pulse  Min: 57  Max: 65   Resp  Min: 16  Max: 20   SpO2  Min: 93 %  Max: 97 %   No Data Recorded   Weight  Min: 81.1 kg " (178 lb 12.8 oz)  Max: 87.2 kg (192 lb 3.2 oz)     GEN:   appears ill, alert, a little confused  HEENT: PERRL, EOMI, no icterus, mmm, no jvd, trachea midline, neck supple  CHEST: decreased right base, no wheezes, + crackles bibasilar, no use of accessory muscles  CV: RRR, no m/g/r  ABD: soft, nt, nd +bs, no hepatosplenomegaly  EXT: no c/c/ 1+edema  SKIN: no rashes, no xanthomas, nl turgor  LYMPH: no palpable cervical or supraclavicular lymphadenopathy  NEURO: CN 2-12 intact and symmetric bilaterally  PSYCH: nl affect, nl orientation, nl judgement, nl mood  MSK: no kyphoscoliosis, 5/5 strength  ue and le bilaterally    Labs:  Wbc 6.9 (nl diff)  hgb 10   plts 211  Cr 1.64 (1.82)  Bicarb 23  Trop 0.048  probnp 2555    CXR: reviewed, agree with dictated report  CT chest: reviewed images in our system from prior; reviewed report in [employer] system    A/P:  1. Acute on chronic systolic chf -- agree with diuresis  2. Pleural effusions -- actually appears improved on cxr compared with prior imaging, but is substantial and persistent, may very well be contributing to DE LEON, can do diagnostic and therapeutic thoracentesis.  3. Mediastinal lymphadenopathy -- mild, likely reactive, would recommend repeat ct chest in 4 months time  4. CKD  5. Anemia  6. Elevated troponin -- suggest cards to see  7. H/o CVA    Discussed with Dr. deleon and Dr. dunham      Electronically signed by Erich García MD at 2018  9:46 AM     Mauricio Corona MD at 2018  8:15 AM      Consult Orders    1. Inpatient Cardiology Consult [[id number]] ordered by Ashley Campbell APRN at 183                Date of Consultation: 18    Referral Provider: Asim Hogue MD     Reason for Consultation: Shortness of breath, CHF    Encounter Provider: Mauricio Corona MD    Group of Service: [facility]     Patient Name: Carlito Mo    :1955    Chief complaint:  Shortness of breath    History of  Present Illness:    Mr. Mo is a 63 year old man who follows with Dr. Murguia and has history coronary artery disease s/p CABG 2001, hypertension, hyperlipidemia, type 2 diabetes, and chronic kidney disease. He was initially seen in consultation in November 2017 when admitted for multiple, small CVAs. His echocardiogram showed LVH, mild left atrial enlargement, and EF 68%. He had a NEISHA which showed EF 45% with focal wall abnormalities in the septum, anterior wall, and apex. A small PFO was also noted. He was readmitted in February 2017 with AMS. An echocardiogram showed EF 63% with grade I LV dysfunction. A Zio patch showed PACs, PVCs, and NSVT. He was again hospitalized in May 2018 with AMS and /109. He had an elevated troponin which was noted to be chronically elevated; no EKG changes noted. He had colonoscopy in September 2018 and became hypoxic following procedure. He was admitted and had echocardiogram which showed EF 46%, grade II diastolic dysfunction, RVSP 35-45mmHg and left pleural effusion. He was last seen in office on 11/14/18; no changes were warranted at that time.     He presented to ED on 11/20/18 with intermittent dyspnea with exertion. He had a CT chest on 11/28/18 at a [facility] which showed bilateral pleural effusions and pulmonary edema. He was referred to ED. Upon arrival, /83 with HR 62; ekg showed sinus bradycardia. Labs: BUN 26, Crea 1.82 proBNP 2550 and troponin 0.047, 0.042, and 0.048. He was treated with IV lasix and admitted for further evaluation.  He does state that he has been short of breath the last several weeks.  This is mainly with activity, and has been worsening.  He has not had any true chest pain, although he has felt pressure when he is short of breath.  He also seems to be confused at times, although he has had this in the past as well.    REVIEW OF SYSTEMS:   Pertinent positives were noted in the history of present illness above.  Otherwise, all  "other systems were reviewed, and are negative.     Objective:     Vitals:    12/01/18 1400 12/01/18 1440 12/01/18 1933 12/01/18 2000   BP: 136/77 135/75 133/74    BP Location: Left arm Left arm Left arm    Patient Position: Lying Lying Lying    Pulse: 57 55 54 58   Resp: 18 18    Temp:   97.8 °F (36.6 °C)    TempSrc:   Oral    SpO2: 98% 100%     Weight:       Height:         Body mass index is 24.43 kg/m².  Flowsheet Rows      First Filed Value   Admission Height  182.9 cm (72\") Documented at 11/30/2018 1904   Admission Weight  87.2 kg (192 lb 3.2 oz) Documented at 11/30/2018 2000           General:    No acute distress, alert and oriented x4, pleasant                   Head:    Normocephalic, atraumatic.   Eyes:          Conjunctivae and sclerae normal, no icterus, PERRLA   Throat:   No oral lesions, no thrush, oral mucosa moist.    Neck:   Supple, trachea midline.   Lungs:     Decreased breath sounds right base, bilateral rales at bases    Heart:    Regular rhythm and normal rate.  II/VI SM throughout.   Abdomen:     Soft, non-tender, non-distended, positive bowel sounds.    Extremities:   1-2+ edema.     Pulses:   Pulses palpable and equal bilaterally.    Skin:   No bleeding or rash.   Neuro:   Non-focal.  Moves all extremities well.    Psychiatric:   Mildly blunted affect.       Assessment:   1. Acute on chronic combined CHF (last EF about 45%)  2. Bilateral pleural effusions  3. Intermittent confusion and altered mental status  4. CAD status post CABG in 2001  5. Multiple small CVAs 11/2017  6. Acute kidney injury with CKD  7. Chronically elevated troponin   8. Mediastinal lymphadenopathy of CT scan   9. Diabetes   10. Chronic anemia     Plan:       The patient currently has multiple issues.  However, he does have clear evidence of worsening congestive heart failure.  He had pulmonary edema and significant bilateral pleural effusions on his outpatient CT scan of the chest prior to admission.  He is renal " function is slightly worse than his baseline, although it improved overnight after diuresis.  I agree with continuing him on Lasix at 40 mg every 12 hours for 3 more doses.  He is on metoprolol 100 mg twice a day, although pending his echocardiogram, this may need to be changed to one of the beta blockers approved for congestive heart failure.  He is not on an ACE inhibitor secondary to his renal function.  An echocardiogram has been ordered and is pending.  I will defer to pulmonology as to whether the pleural effusions need to be drained.  He will continue on aspirin and Plavix, as well as Lipitor for his history of coronary artery disease.  I will also check thyroid function tests in the morning to ensure that hypothyroidism is not contributing to his congestive heart failure.  If his ejection fraction has dropped significantly, he may need an eventual cardiac catheterization.  The echocardiogram will determine several next steps.  Neurology is following him for his altered mental status.    Thank you very much this consult.    Jonatan Corona M.D.  Electronically signed by Mauricio Corona MD at 12/1/2018  8:56 PM       All medication doses during the admission are shown, including meds that are no longer on order.   Scheduled Meds Sorted by Name   for Carlito Mo as of 11/27/18 through 12/3/18     1 Day 3 Days 7 Days 10 Days < Today >    Legend:                          Inactive     Active     Other Encounter    Linked               Medications 11/27/18 11/28/18 11/29/18 11/30/18 12/01/18 12/02/18 12/03/18   amLODIPine (NORVASC) tablet 5 mg   Dose: 5 mg  Freq: Every 24 Hours Scheduled Route: PO  Start: 12/02/18 1430    Admin Instructions:   Caution: Look alike/sound alike drug alert. Avoid grapefruit juice.         [id number]        aspirin EC tablet 81 mg   Dose: 81 mg  Freq: Daily Route: PO  Start: 12/01/18 0900    Admin Instructions:   Swallow tablet whole.  Do not crush, chew, or split.  Do not  exceed 4 grams of aspirin in a 24 hr period.    If given for pain, use the following pain scale:   Mild Pain = Pain Score of 1-3, CPOT 1-2  Moderate Pain = Pain Score of 4-6, CPOT 3-4  Severe Pain = Pain Score of 7-10, CPOT 5-8        0840      0851      0834        atorvastatin (LIPITOR) tablet 40 mg   Dose: 40 mg  Freq: Nightly Route: PO  Start: 12/01/18 0052    Admin Instructions:   Avoid grapefruit juice.        0210 2000 2240 2100        buPROPion XL (WELLBUTRIN XL) 24 hr tablet 150 mg   Dose: 150 mg  Freq: Daily Route: PO  Start: 12/01/18 0900    Admin Instructions:   Swallow whole. Do not crush, chew, or split tablet.        0840      0851      0834        clopidogrel (PLAVIX) tablet 75 mg   Dose: 75 mg  Freq: Daily Route: PO  Start: 12/02/18 0900         0851      0834        clopidogrel (PLAVIX) tablet 75 mg   Dose: 75 mg  Freq: Daily Route: PO  Start: 12/01/18 0900 End: 12/01/18 0856        0840     0856-D/C'd        furosemide (LASIX) injection 40 mg   Dose: 40 mg  Freq: Every 12 Hours Route: IV  Start: 12/01/18 0945 End: 12/02/18 0850        0945     2134      0850         furosemide (LASIX) injection 40 mg   Dose: 40 mg  Freq: Once Route: IV  Start: 11/30/18 2307 End: 11/30/18 2341       2341           insulin lispro (humaLOG) injection 0-9 Units   Dose: 0-9 Units  Freq: 4 Times Daily With Meals & Nightly Route: SC  Start: 11/30/18 221    Admin Instructions:   Correction - Moderate Dose.  40-60 units/day total insulin dose or average weight, on oral agents    Blood glucose 150-199 mg/dL - 2 units  Blood glucose 200-249 mg/dL - 4 units  Blood glucose 250-299 mg/dL - 6 units  Blood glucose 300-349 mg/dL - 7 units  Blood glucose 350-400 mg/dL - 8 units  Blood glucose greater than 400 mg/dL - 9 units and call provider   Caution: Look alike/sound alike drug alert       2346 [C]      0138 [phone]     (2229)      (0822)     1200      1800     2100        levothyroxine (SYNTHROID, LEVOTHROID) tablet 50 mcg   Dose: 50 mcg  Freq: Daily Route: PO  Start: 12/01/18 0900 End: 12/02/18 1341    Admin Instructions:   Take on empty stomach.        0840      0851     1341-D/C'd       levothyroxine (SYNTHROID, LEVOTHROID) tablet 75 mcg   Dose: 75 mcg  Freq: Daily Route: PO  Start: 12/03/18 0900 End: 12/20/18 0859    Admin Instructions:   Take on empty stomach.          0834        lidocaine (XYLOCAINE) 1 % injection 20 mL   Dose: 20 mL  Freq: Once Route: SC  Start: 12/01/18 1530 End: 12/01/18 1423        1423 [C]          metoprolol tartrate (LOPRESSOR) tablet 100 mg   Dose: 100 mg  Freq: 2 Times Daily Route: PO  Start: 12/01/18 0052        (0210) [C]     0840     2000      0851     2240      0834     2100        perflutren (DEFINITY) 8.476 mg in sodium chloride 0.9 % 10 mL injection   Dose: 3 mL  Freq: Once Route: IV  Start: 12/02/18 1300 End: 12/02/18 1300    Admin Instructions:   Mix 1.3 mL of mechanically activated Definity with 8.7 mL of normal saline. A total of 3 mL of the resulting Definity solution was administered.         1300         sodium chloride 0.9 % flush 3 mL   Dose: 3 mL  Freq: Every 12 Hours Scheduled Route: IV  Start: 11/30/18 2219        0135        2001 0851     2242      0834     2100        Medications 11/27/18 11/28/18 11/29/18 11/30/18 12/01/18 12/02/18 12/03/18       Continuous Meds Sorted by Name   for Carlito Mo as of 11/27/18 through 12/3/18    Legend:                          Inactive     Active     Other Encounter    Linked               Medications 11/27/18 11/28/18 11/29/18 11/30/18 12/01/18 12/02/18 12/03/18   Hold medication   Dose: 1 each  Freq: Continuous PRN Route: XX  PRN Comment: For 24 Hours Prior to Thoracentesis  Start: 12/01/18 0852    Order specific questions:   Medication(s) Anticoagulation  Hold Details Hold next dose(s) (please specify in comment field)  Which Dose Future dose (specify in  rationale/details)  Rationale/Details For 24 hours before thoracentesis                 PRN Meds Sorted by Name   for Carlito Mo as of 11/27/18 through 12/3/18    Legend:                          Inactive     Active     Other Encounter    Linked               Medications 11/27/18 11/28/18 11/29/18 11/30/18 12/01/18 12/02/18 12/03/18   acetaminophen (TYLENOL) tablet 650 mg   Dose: 650 mg  Freq: Every 4 Hours PRN Route: PO  PRN Reason: Mild Pain   Start: 11/30/18 2216    Admin Instructions:   Do not exceed 4 grams of acetaminophen in a 24 hr period.    If given for pain, use the following pain scale:   Mild Pain = Pain Score of 1-3, CPOT 1-2  Moderate Pain = Pain Score of 4-6, CPOT 3-4  Severe Pain = Pain Score of 7-10, CPOT 5-8        1606          dextrose (D50W) 25 g/ 50mL Intravenous Solution 25 g   Dose: 25 g  Freq: Every 15 Minutes PRN Route: IV  PRN Reason: Low Blood Sugar  PRN Comment: Blood Sugar Less Than 70  Start: 11/30/18 2215    Admin Instructions:   Blood sugar less than 70; patient has IV access - Unresponsive, NPO or Unable To Safely Swallow             dextrose (GLUTOSE) oral gel 15 g   Dose: 15 g  Freq: Every 15 Minutes PRN Route: PO  PRN Reason: Low Blood Sugar  PRN Comment: Blood sugar less than 70  Start: 11/30/18 2215    Admin Instructions:   BS<70, Patient Alert, Is not NPO, Can safely swallow.             glucagon (human recombinant) (GLUCAGEN DIAGNOSTIC) injection 1 mg   Dose: 1 mg  Freq: As Needed Route: SC  PRN Comment: Blood Glucose Less Than 70  Start: 11/30/18 2215    Admin Instructions:   Blood Glucose Less Than 70 - Patient Without IV Access - Unresponsive, NPO or Unable To Safely Swallow             Hold medication   Dose: 1 each  Freq: Continuous PRN Route: XX  PRN Comment: For 24 Hours Prior to Thoracentesis  Start: 12/01/18 0852    Order specific questions:   Medication(s) Anticoagulation  Hold Details Hold next dose(s) (please specify in comment field)  Which Dose Future dose  (specify in rationale/details)  Rationale/Details For 24 hours before thoracentesis             sodium chloride 0.9 % flush 1-10 mL   Dose: 1-10 mL  Freq: As Needed Route: IV  PRN Reason: Line Care  Start: 11/30/18 2215             Medications 11/27/18 11/28/18 11/29/18 11/30/18 12/01/18 12/02/18 12/03/18

## 2018-12-03 NOTE — PLAN OF CARE
Problem: Patient Care Overview  Goal: Plan of Care Review   12/03/18 8491   OTHER   Outcome Summary Pt A&Ox4. VSS. Up moving well. no complaints. Will monitor.

## 2018-12-03 NOTE — PLAN OF CARE
Problem: Patient Care Overview  Goal: Plan of Care Review  Outcome: Ongoing (interventions implemented as appropriate)   12/03/18 0440   Coping/Psychosocial   Plan of Care Reviewed With patient   Plan of Care Review   Progress improving   OTHER   Outcome Summary Nihss was zero, pt was medicated as ordered & tolerated well, vss, no distress I will continue to monitor.     Goal: Individualization and Mutuality  Outcome: Ongoing (interventions implemented as appropriate)    Goal: Discharge Needs Assessment  Outcome: Ongoing (interventions implemented as appropriate)    Goal: Interprofessional Rounds/Family Conf  Outcome: Ongoing (interventions implemented as appropriate)      Problem: Fall Risk (Adult)  Goal: Identify Related Risk Factors and Signs and Symptoms  Outcome: Ongoing (interventions implemented as appropriate)    Goal: Absence of Fall  Outcome: Ongoing (interventions implemented as appropriate)      Problem: Cardiac: Heart Failure (Adult)  Goal: Signs and Symptoms of Listed Potential Problems Will be Absent, Minimized or Managed (Cardiac: Heart Failure)  Outcome: Ongoing (interventions implemented as appropriate)

## 2018-12-03 NOTE — PROGRESS NOTES
"Patient Name: Carlito Mo  :1955  63 y.o.      Patient Care Team:  Jyoti Reynolds MD as PCP - General (Internal Medicine)  Amber Murguia MD as Consulting Physician (Cardiology)    Interval History:   No ins and outs recorded.  Weight unreliable as it was done on a bed scale today for reason.    Subjective:  Following for acute systolic congestive heart failure     Objective   Vital Signs  Temp:  [97.3 °F (36.3 °C)-97.8 °F (36.6 °C)] 97.3 °F (36.3 °C)  Heart Rate:  [53-60] 56  Resp:  [16-18] 18  BP: (125-152)/(65-85) 152/85  No intake or output data in the 24 hours ending 18 0847  Flowsheet Rows      First Filed Value   Admission Height  182.9 cm (72\") Documented at 2018 1904   Admission Weight  87.2 kg (192 lb 3.2 oz) Documented at 2018          Physical Exam:   General Appearance:    Alert, cooperative, in no acute distress   Lungs:     Clear to auscultation.  Normal respiratory effort and rate.      Heart:    Regular rhythm and normal rate, normal S1 and S2, no murmurs, gallops or rubs.     Chest Wall:    No abnormalities observed   Abdomen:     Soft, nontender, positive bowel sounds.     Extremities:   no cyanosis, clubbing or edema.  No marked joint deformities.  Adequate musculoskeletal strength.       Results Review:    Results from last 7 days   Lab Units  18   0450   SODIUM mmol/L  139   POTASSIUM mmol/L  4.1   CHLORIDE mmol/L  100   CO2 mmol/L  27.0   BUN mg/dL  27*   CREATININE mg/dL  1.63*   GLUCOSE mg/dL  123*   CALCIUM mg/dL  8.7     Results from last 7 days   Lab Units  18   0548  18   0202  18   TROPONIN T ng/mL  0.048*  0.042*  0.047*     Results from last 7 days   Lab Units  18   0449   WBC 10*3/mm3  7.38   HEMOGLOBIN g/dL  10.4*   HEMATOCRIT %  33.9*   PLATELETS 10*3/mm3  252     Results from last 7 days   Lab Units  18   0945   INR   1.14*     Results from last 7 days   Lab Units  18   0450   CHOLESTEROL mg/dL "  124         Results from last 7 days   Lab Units  12/03/18   0450   CHOLESTEROL mg/dL  124   TRIGLYCERIDES mg/dL  72   HDL CHOL mg/dL  49   LDL CHOL mg/dL  61         Medication Review:     amLODIPine 5 mg Oral Q24H   aspirin 81 mg Oral Daily   atorvastatin 40 mg Oral Nightly   buPROPion  mg Oral Daily   clopidogrel 75 mg Oral Daily   insulin lispro 0-9 Units Subcutaneous 4x Daily With Meals & Nightly   levothyroxine 75 mcg Oral Daily   metoprolol tartrate 100 mg Oral BID   sodium chloride 3 mL Intravenous Q12H          hold 1 each       Assessment/Plan     1.  Acute on chronic systolic congestive heart failure.  Repeat echo ejection fraction 43% which is not much different than his last ejection fraction.  We'll transition to Toprol-XL since metoprolol tartrate is not indicated for congestive heart failure.  I do not see that he is on a diuretic as an outpatient.  Kidney function handled IV diuresis well.  I will start him on oral Lasix today.  No ACE inhibitor or angiotensin receptor blocker secondary to kidney disease.  2.  Coronary artery disease status post CABG in 2001.  3.  History of multiple small strokes in November 2017.  Diagnosed on this admission with a small acute stroke.  4.  Chronic kidney disease  5.  Chronically elevated troponin.  No acute changes.  6.  Diabetes  7.  Chronic anemia  8.  Pleural effusion.  2 L drained from the right lung on December 1, 2018.    - PO lasix  - Toprol XL    Amber Murguia MD, [facility]  12/03/18  8:47 AM

## 2018-12-03 NOTE — PROGRESS NOTES
Discharge Planning Assessment  [facility]     Patient Name: Carlito Mo  MRN: [id number]  Today's Date: 12/3/2018    Admit Date: 11/30/2018    Discharge Needs Assessment     Row Name 12/03/18 1410       Living Environment    Lives With  child(carlos), adult;spouse    Current Living Arrangements  home/apartment/condo tri level home    Primary Care Provided by  self    Quality of Family Relationships  supportive    Able to Return to Prior Arrangements  yes       Transition Planning    Patient/Family Anticipates Transition to  home with family       Discharge Needs Assessment    Readmission Within the Last 30 Days  no previous admission in last 30 days    Concerns to be Addressed  no discharge needs identified;denies needs/concerns at this time    Equipment Currently Used at Home  oxygen        Discharge Plan     Row Name 12/03/18 1966       Plan    Plan  Plans are home.  CCP will follow     Patient/Family in Agreement with Plan  yes    Plan Comments  CCP spoke with pt @ bedside, confirmed face sheet and pharmacy info with pt.  Pt states he is IDAL's, has only o2 @ hs.  Pt admits he is not very compliant with use of o2, but he also states his tubing needs replacing.  CCP made call to [facility] to have them follow up after dc to assist with equipment needs.  Pt states he lives with spouse and 2 adult sons in tri level home. Pt's wife is currently in rehab for broken leg ([place]) and due to be released home on 12/4.  Pt states plans are home with no needs anticipated at this time. CCP will follow. Zahida Jimenez RN        Destination      No service coordination in this encounter.      Durable Medical Equipment      No service coordination in this encounter.      Dialysis/Infusion      No service coordination in this encounter.      Home Medical Care      No service coordination in this encounter.      Community Resources      No service coordination in this encounter.          Demographic Summary    No  documentation.       Functional Status     Row Name 12/03/18 1327       Functional Status, IADL    Medications  independent    Meal Preparation  independent       Mental Status Summary    Recent Changes in Mental Status/Cognitive Functioning  no changes        Psychosocial    No documentation.       Abuse/Neglect    No documentation.       Legal     Row Name 12/03/18 1327       Financial/Legal    Who Manages Finances if Patient Unable  Pt states he has a living will, he is aware copy not on file.  Encouraged pt to bring copy for medical records        Substance Abuse    No documentation.       Patient Forms    No documentation.           Zahida Jimenez RN

## 2018-12-03 NOTE — THERAPY EVALUATION
Acute Care - Physical Therapy Initial Evaluation  [facility]     Patient Name: Carlito Mo  : 1955  MRN: [id number]  Today's Date: 12/3/2018   Onset of Illness/Injury or Date of Surgery: 18  Date of Referral to PT: 18  Referring Physician: Catie      Admit Date: 2018    Visit Dx:     ICD-10-CM ICD-9-CM   1. Acute congestive heart failure, unspecified heart failure type (CMS/HCC) I50.9 428.0   2. Pleural effusion, bilateral J90 511.9   3. Confusion R41.0 298.9   4. Impaired functional mobility, balance, gait, and endurance Z74.09 V49.89     Patient Active Problem List   Diagnosis   • Major depressive disorder with single episode, in partial remission (CMS/HCC)   • Other hyperlipidemia   • Chronic ischemic heart disease   • Vitamin D deficiency   • Erectile dysfunction   • Chronic fatigue   • Type 2 diabetes mellitus with ophthalmic complication (CMS/[facility])   • Skin lesion of chest wall   • Slow transit constipation   • Benign prostatic hyperplasia with nocturia   • CKD (chronic kidney disease) stage 3, GFR 30-59 ml/min (CMS/HCC)   • History of basal cell carcinoma   • Essential hypertension   • Acquired hypothyroidism   • Cerebrovascular accident (CVA) due to embolism of precerebral artery (CMS/HCC)   • Urinary retention   • SYLVAIN (acute kidney injury) (CMS/HCC)   • Pneumonia   • Acute on chronic combined systolic and diastolic congestive heart failure (CMS/HCC)   • Acute respiratory failure with hypoxia (CMS/HCC)   • Suspected sleep apnea   • Pleural effusion     Past Medical History:   Diagnosis Date   • Acquired hypothyroidism    • Acute congestive heart failure (CMS/HCC)    • Acute respiratory failure with hypoxia (CMS/HCC)    • Acute sinusitis    • SYLVAIN (acute kidney injury) (CMS/HCC)     on CKD   • Anemia    • Arthritis    • Cancer (CMS/HCC)     skin   • Chronic ischemic heart disease    • CKD (chronic kidney disease)    • Depression    • Diabetes mellitus type 2, uncontrolled,  without complications (CMS/HCC)    • Disease of thyroid gland    • Fatigue    • Heart attack (CMS/HCC)    • History of coronary artery disease     with remote history of bypass surgery in 2001   • History of transfusion    • Hyperglycemia    • Hyperlipidemia    • Hypertension    • Hypertensive encephalopathy    • Mental status change     Acute   • Pneumonia    • RBBB (right bundle branch block)    • Screening for prostate cancer 2011   • Stroke (CMS/HCC)    • TIA (transient ischemic attack)    • Type 2 diabetes mellitus (CMS/HCC)    • Urinary retention    • Vitamin D deficiency      Past Surgical History:   Procedure Laterality Date   • APPENDECTOMY N/A 02/14/2016    Dr. Alexey Dodson   • COLONOSCOPY      over 20 years ago.  no polyps    • CORONARY ARTERY BYPASS GRAFT  11/2001   • TONSILLECTOMY     • VASECTOMY          PT ASSESSMENT (last 12 hours)      Physical Therapy Evaluation     Row Name 12/03/18 1532          PT Evaluation Time/Intention    Subjective Information  no complaints  -     Document Type  evaluation  -     Mode of Treatment  individual therapy;physical therapy  -     Patient Effort  good  -     Symptoms Noted During/After Treatment  none  -     Row Name 12/03/18 1535          General Information    Patient Profile Reviewed?  yes  -     Onset of Illness/Injury or Date of Surgery  11/30/18  -     Referring Physician  Catie  -     Patient Observations  cooperative;agree to therapy;alert  -     General Observations of Patient  pt supine in bed no acute distress  -     Equipment Currently Used at Home  none  -     Pertinent History of Current Functional Problem  CVA  -     Existing Precautions/Restrictions  fall  -     Risks Reviewed  patient:  -     Benefits Reviewed  patient:  -     Row Name 12/03/18 1534          Cognitive Assessment/Intervention- PT/OT    Orientation Status (Cognition)  oriented x 3  -     Follows Commands (Cognition)  WFL  -     Safety  Deficit (Cognitive)  mild deficit  -[facility] Name 12/03/18 1531          Bed Mobility Assessment/Treatment    Bed Mobility Assessment/Treatment  supine-sit;sit-supine  -     Supine-Sit [place] (Bed Mobility)  supervision  -     Sit-Supine [place] (Bed Mobility)  supervision  -[facility] Name 12/03/18 1531          Transfer Assessment/Treatment    Transfer Assessment/Treatment  sit-stand transfer;stand-sit transfer  -     Sit-Stand [place] (Transfers)  contact guard  -     Stand-Sit [place] (Transfers)  contact guard  -[facility] Name 12/03/18 1531          Gait/Stairs Assessment/Training    [place] Level (Gait)  contact guard;stand by assist  -     Distance in Feet (Gait)  150  -     Pattern (Gait)  swing-through  -     Deviations/Abnormal Patterns (Gait)  eli decreased;base of support, wide;ataxic  -     Comment (Gait/Stairs)  occassionally reaching for counter tops and wall. VCs for improved mechanics  -[facility] Name 12/03/18 1531          General ROM    GENERAL ROM COMMENTS  BLEs WFL  -[facility] Name 12/03/18 1531          MMT (Manual Muscle Testing)    General MMT Comments  BLEs grossly at least 4/5  -[facility] Name 12/03/18 1531          Pain Assessment    Additional Documentation  Pain Scale: Numbers Pre/Post-Treatment (Group)  -[facility] Name 12/03/18 1531          Pain Scale: Numbers Pre/Post-Treatment    Pain Scale: Numbers, Pretreatment  0/10 - no pain  -     Pain Scale: Numbers, Post-Treatment  0/10 - no pain  -[facility] Name 12/03/18 1531          Plan of Care Review    Plan of Care Reviewed With  patient  -[facility] Name 12/03/18 [facility]          Physical Therapy Clinical Impression    Date of Referral to PT  12/03/18  -     Criteria for Skilled Interventions Met (PT Clinical Impression)  yes  -     Pathology/Pathophysiology Noted (Describe Specifically for Each System)  musculoskeletal;neuromuscular  -     Impairments Found (describe specific  impairments)  ROM;joint integrity and mobility;gait, locomotion, and balance  -     Functional Limitations in Following Categories (Describe Specific Limitations)  home management;self-care;work  -     Rehab Potential (PT Clinical Summary)  good, to achieve stated therapy goals  -     Row Name 12/03/18 1531          Physical Therapy Goals    Transfer Goal Selection (PT)  transfer, PT goal 1  -     Gait Training Goal Selection (PT)  gait training, PT goal 1  -     Stairs Goal Selection (PT)  stairs, PT goal 1  -     Additional Documentation  Stairs Goal Selection (PT) (Row)  -[facility] Name 12/03/18 1531          Transfer Goal 1 (PT)    Activity/Assistive Device (Transfer Goal 1, PT)  transfers, all  -     [place] Level/Cues Needed (Transfer Goal 1, PT)  independent  -     Time Frame (Transfer Goal 1, PT)  long term goal (LTG);1 week  -[facility] Name 12/03/18 1531          Gait Training Goal 1 (PT)    [place] Level (Gait Training Goal 1, PT)  independent  -     Distance (Gait Goal 1, PT)  300  -     Time Frame (Gait Training Goal 1, PT)  long term goal (LTG);1 week  -[facility] Name 12/03/18 1531          Stairs Goal 1 (PT)    Activity/Assistive Device (Stairs Goal 1, PT)  ascending stairs;descending stairs  -     [place] Level/Cues Needed (Stairs Goal 1, PT)  supervision required  -     Number of Stairs (Stairs Goal 1, PT)  8  -     Time Frame (Stairs Goal 1, PT)  long term goal (LTG);1 week  -[facility] Name 12/03/18 1531          Positioning and Restraints    Pre-Treatment Position  in bed  -     Post Treatment Position  bed  -     In Bed  supine;call light within reach;encouraged to call for assist;exit alarm on  -       User Key  (r) = Recorded By, (t) = Taken By, (c) = Cosigned By    Initials Name Provider Type     Alisia Luna, PT Physical Therapist        Physical Therapy Education     Title: PT OT SLP Therapies (Done)     Topic: Physical Therapy (Done)      Point: Mobility training (Done)     Learning Progress Summary           Patient Acceptance, E, VU,NR by  at 12/3/2018  3:41 PM                   Point: Home exercise program (Done)     Learning Progress Summary           Patient Acceptance, E, VU,NR by  at 12/3/2018  3:41 PM                   Point: Body mechanics (Done)     Learning Progress Summary           Patient Acceptance, E, VU,NR by  at 12/3/2018  3:41 PM                   Point: Precautions (Done)     Learning Progress Summary           Patient Acceptance, E, VU,NR by  at 12/3/2018  3:41 PM                               User Key     Initials Effective Dates Name Provider Type Discipline     04/03/18 -  Alisia Luna, PT Physical Therapist PT              PT Recommendation and Plan  Anticipated Discharge Disposition (PT): home with OP services  Planned Therapy Interventions (PT Eval): balance training, bed mobility training, gait training, home exercise program, ROM (range of motion), stair training, strengthening, stretching, transfer training  Therapy Frequency (PT Clinical Impression): daily  Outcome Summary/Treatment Plan (PT)  Anticipated Discharge Disposition (PT): home with OP services  Plan of Care Reviewed With: patient  Outcome Summary: pt presents w, altered gait mechanics (slowed eli, WBOS, mild ataxia) reaches for furniture, VCs for improved gait/balance, s/p acute/subacute CVAs. pt reports independent baseline, does use AD however reports multiple falls in past 6 mos. pt may benefit from skilled PT to address functional deficits, recommend [facility] vs [place] PT at [place].   Outcome Measures     Row Name 12/03/18 1500             How much help from another person do you currently need...    Turning from your back to your side while in flat bed without using bedrails?  4  -LH      Moving from lying on back to sitting on the side of a flat bed without bedrails?  4  -LH      Moving to and from a bed to a chair (including a wheelchair)?  4  -LH       Standing up from a chair using your arms (e.g., wheelchair, bedside chair)?  3  -      Climbing 3-5 steps with a railing?  3  -      To walk in hospital room?  3  -      AM-PAC 6 Clicks Score  21  -         Functional Assessment    Outcome Measure Options  AM-PAC 6 Clicks Basic Mobility (PT)  -        User Key  (r) = Recorded By, (t) = Taken By, (c) = Cosigned By    Initials Name Provider Type     Alisia Luna, PT Physical Therapist         Time Calculation:   PT Charges     Row Name 12/03/18 1544             Time Calculation    Start Time  1514  -      Stop Time  1530  -      Time Calculation (min)  16 min  -      PT Received On  12/03/18  -      PT - Next Appointment  12/04/18  -      PT Goal Re-Cert Due Date  12/10/18  -        User Key  (r) = Recorded By, (t) = Taken By, (c) = Cosigned By    Initials Name Provider Type     Alisia Luna, PT Physical Therapist        Therapy Suggested Charges     Code   Minutes Charges    None           Therapy Charges for Today     Code Description Service Date Service Provider Modifiers Qty    44486712329 HC PT EVAL MOD COMPLEXITY 2 12/3/2018 Alisia Luna, PT GP 1          PT G-Codes  Outcome Measure Options: AM-PAC 6 Clicks Basic Mobility (PT)  AM-PAC 6 Clicks Score: 21      Alisia Luna, PT  12/3/2018

## 2018-12-03 NOTE — PLAN OF CARE
Problem: Patient Care Overview  Goal: Plan of Care Review   12/03/18 1250   Coping/Psychosocial   Plan of Care Reviewed With patient   OTHER   Outcome Summary pt presents w, altered gait mechanics (slowed eli, WBOS, mild ataxia) reaches for furniture, VCs for improved gait/balance, s/p acute/subacute CVAs. pt reports independent baseline, does use AD however reports multiple falls in past 6 mos. pt may benefit from skilled PT to address functional deficits, recommend [facility] vs [place] PT at [place].

## 2018-12-04 VITALS
DIASTOLIC BLOOD PRESSURE: 77 MMHG | OXYGEN SATURATION: 100 % | SYSTOLIC BLOOD PRESSURE: 144 MMHG | HEART RATE: 72 BPM | BODY MASS INDEX: 24.22 KG/M2 | WEIGHT: 178.8 LBS | RESPIRATION RATE: 16 BRPM | HEIGHT: 72 IN | TEMPERATURE: 97.8 F

## 2018-12-04 LAB
CYTO UR: NORMAL
GLUCOSE BLDC GLUCOMTR-MCNC: 186 MG/DL (ref 70–130)
GLUCOSE BLDC GLUCOMTR-MCNC: 189 MG/DL (ref 70–130)
GLUCOSE BLDC GLUCOMTR-MCNC: 193 MG/DL (ref 70–130)
GLUCOSE BLDC GLUCOMTR-MCNC: 93 MG/DL (ref 70–130)
LAB AP CASE REPORT: NORMAL
PATH REPORT.FINAL DX SPEC: NORMAL
PATH REPORT.GROSS SPEC: NORMAL
RPR SER-TITR: NON REACTIVE {TITER}

## 2018-12-04 PROCEDURE — 82962 GLUCOSE BLOOD TEST: CPT

## 2018-12-04 PROCEDURE — 99232 SBSQ HOSP IP/OBS MODERATE 35: CPT | Performed by: PSYCHIATRY & NEUROLOGY

## 2018-12-04 PROCEDURE — 63710000001 INSULIN LISPRO (HUMAN) PER 5 UNITS: Performed by: NURSE PRACTITIONER

## 2018-12-04 RX ORDER — METOPROLOL SUCCINATE 100 MG/1
100 TABLET, EXTENDED RELEASE ORAL
Qty: 30 TABLET | Refills: 0 | Status: SHIPPED | OUTPATIENT
Start: 2018-12-05 | End: 2019-01-04

## 2018-12-04 RX ORDER — FUROSEMIDE 40 MG/1
40 TABLET ORAL DAILY
Qty: 30 TABLET | Refills: 0 | OUTPATIENT
Start: 2018-12-05 | End: 2021-05-24 | Stop reason: HOSPADM

## 2018-12-04 RX ORDER — LEVOTHYROXINE SODIUM 0.07 MG/1
75 TABLET ORAL DAILY
Qty: 30 TABLET | Refills: 0 | Status: SHIPPED | OUTPATIENT
Start: 2018-12-05 | End: 2019-01-04

## 2018-12-04 RX ADMIN — BUPROPION HYDROCHLORIDE 150 MG: 150 TABLET, FILM COATED, EXTENDED RELEASE ORAL at 08:12

## 2018-12-04 RX ADMIN — METOPROLOL SUCCINATE 100 MG: 100 TABLET, FILM COATED, EXTENDED RELEASE ORAL at 08:12

## 2018-12-04 RX ADMIN — INSULIN LISPRO 2 UNITS: 100 INJECTION, SOLUTION INTRAVENOUS; SUBCUTANEOUS at 17:15

## 2018-12-04 RX ADMIN — SODIUM CHLORIDE, PRESERVATIVE FREE 3 ML: 5 INJECTION INTRAVENOUS at 08:12

## 2018-12-04 RX ADMIN — INSULIN LISPRO 2 UNITS: 100 INJECTION, SOLUTION INTRAVENOUS; SUBCUTANEOUS at 12:08

## 2018-12-04 RX ADMIN — ASPIRIN 81 MG: 81 TABLET, DELAYED RELEASE ORAL at 08:12

## 2018-12-04 RX ADMIN — CLOPIDOGREL 75 MG: 75 TABLET, FILM COATED ORAL at 08:12

## 2018-12-04 RX ADMIN — AMLODIPINE BESYLATE 5 MG: 5 TABLET ORAL at 08:12

## 2018-12-04 RX ADMIN — FUROSEMIDE 40 MG: 40 TABLET ORAL at 08:12

## 2018-12-04 RX ADMIN — LEVOTHYROXINE SODIUM 75 MCG: 75 TABLET ORAL at 08:12

## 2018-12-04 RX ADMIN — SODIUM CHLORIDE, PRESERVATIVE FREE 3 ML: 5 INJECTION INTRAVENOUS at 20:12

## 2018-12-04 NOTE — CONSULTS
"Diabetes Education  Assessment/Teaching    Patient Name:  Carlito Mo  YOB: 1955  MRN: [id number]  Admit Date:  11/30/2018      Assessment Date:  12/4/2018    Most Recent Value   General Information    Height  182.9 cm (72\")   Weight  81.1 kg (178 lb 12.8 oz)   Weight Method  Bed scale   Pregnancy Assessment   Diabetes History   What type of diabetes do you have?  Type 2   Do you test your blood sugar at home?  yes   Frequency of checks  every other day   Who performs the test?  self   Typical readings  100's   Do you have any diabetes complications?  heart disease, circulation problems   Education Preferences   Nutrition Information   Assessment Topics   Taking Medication - Assessment  Needs education   Problem Solving - Assessment  Needs education   Reducing Risk - Assessment  Needs education   Healthy Coping - Assessment  Competent   Monitoring - Assessment  Needs education   DM Goals   Taking Medication - Goal  Tomorrow            Most Recent Value   DM Education Needs   Meter  Has own   Frequency of Testing  Daily [Encouraged to take blood sugars at least daily]   Medication  Insulin   Problem Solving  Hypoglycemia, Hyperglycemia, Sick days, Signs, Symptoms, Treatment   Reducing Risks  A1C testing   Discharge Plan  Home   Motivation  Moderate   Teaching Method  Explanation, Discussion   Patient Response  Verbalized understanding            Other Comments:  Patient states that he went to  insulin last week but the pharmacy was out of this particular insulin.  He states that it is currently ready and he will  on the way home.  He says that he has been on and off of insulin for a year and at one point did not have insurance and could not afford it.  He currently has Medicaid and pays for his insulin in full.  Thank you for this referral.        Electronically signed by:  Myah Reyes RN  12/04/18 2:23 PM  "

## 2018-12-04 NOTE — PROGRESS NOTES
Continued Stay Note  [facility]     Patient Name: Carlito Mo  MRN: [id number]  Today's Date: 12/4/2018    Admit Date: 11/30/2018    Discharge Plan     Row Name 12/04/18 1402       Plan    Plan Comments  CCP received a call from Dr. Reynolds office regarding pt's meds.  They state pt has insulin ready, he has not picked up from [facility] pharmacy for 2 weeks.  CCP spoke with pt @ bedside, he states he didn't  because he was here, then when I reminded him it was ready prior to adm, pt states he didn't know it was there.  He said he picked up other meds last week and they did not tell him about the insulin.  CCP discussed that pt had told nursing staff on adm that he was non compliant since he didn't like giving himself shots. He states he will  meds after dc.   DM educator consult made to see prior to dc. Zahida Jimenez RN        Discharge Codes    No documentation.       Expected Discharge Date and Time     Expected Discharge Date Expected Discharge Time    Dec 4, 2018             Zahida Jimenez RN

## 2018-12-04 NOTE — PLAN OF CARE
Problem: Patient Care Overview  Goal: Plan of Care Review  Outcome: Ongoing (interventions implemented as appropriate)   12/04/18 0608   Coping/Psychosocial   Plan of Care Reviewed With patient   Plan of Care Review   Progress improving   OTHER   Outcome Summary Pt slept well with stable vital signs,Nihss was zero, no distress noticed, I will continue to monitor.     Goal: Individualization and Mutuality  Outcome: Ongoing (interventions implemented as appropriate)    Goal: Discharge Needs Assessment  Outcome: Ongoing (interventions implemented as appropriate)    Goal: Interprofessional Rounds/Family Conf  Outcome: Ongoing (interventions implemented as appropriate)      Problem: Fall Risk (Adult)  Goal: Identify Related Risk Factors and Signs and Symptoms  Outcome: Ongoing (interventions implemented as appropriate)    Goal: Absence of Fall  Outcome: Ongoing (interventions implemented as appropriate)      Problem: Cardiac: Heart Failure (Adult)  Goal: Signs and Symptoms of Listed Potential Problems Will be Absent, Minimized or Managed (Cardiac: Heart Failure)  Outcome: Ongoing (interventions implemented as appropriate)

## 2018-12-04 NOTE — NURSING NOTE
Spoke with Dr. Marie with neurology who said he would not be by to see the patient until after his office hours today. Called Dr. Haddad to update him. Dr. Haddad said to continue with discharge after seen by neuro this evening.

## 2018-12-04 NOTE — DISCHARGE SUMMARY
Date of Admission: 11/30/2018  Date of Discharge:  12/4/2018  Primary Care Physician: Jyoti Reynolds MD     Discharge Diagnosis:  Active Hospital Problems    Diagnosis Date Noted   • **Acute on chronic combined systolic and diastolic congestive heart failure (CMS/HCC) [I50.43] 09/21/2018   • Pleural effusion [J90] 12/01/2018   • Cerebrovascular accident (CVA) due to embolism of precerebral artery (CMS/HCC) [I63.10] 02/20/2018   • Acquired hypothyroidism [E03.9] 12/20/2017   • Essential hypertension [I10] 12/20/2016   • CKD (chronic kidney disease) stage 3, GFR 30-59 ml/min (CMS/[facility]) [N18.3] 12/20/2016   • Type 2 diabetes mellitus with ophthalmic complication (CMS/[facility]) [E11.39] 04/25/2016      Resolved Hospital Problems   No resolved problems to display.       DETAILS OF HOSPITAL STAY     Pertinent Test Results and Procedures Performed    Chest x-ray:  There is a moderate-sized subpulmonic right pleural effusion. A similar  sized effusion was present on the previous chest x-ray. This produces  compressive atelectasis of the right lower lobe. Superimposed right  lower lobe pneumonia cannot be excluded. The left lung appears free of  infiltrates. Only a very tiny left pleural effusion is evident. The  heart is normal in size. Stigmata of previous coronary artery bypass  procedure are evident.    Head CT:  The brain and ventricles are symmetrical. There is no evidence  of intracranial hemorrhage, hydrocephalus or of abnormal extra-axial  fluid. Areas of decreased attenuation are appreciated involving the  white matter of the cerebral hemispheres bilaterally, nonspecific but  suggesting small vessel ischemic disease, moderate. The appearance is  similar to the prior examination.     Partially calcified scalp nodules are present in the left parietal  region, unchanged. A mucus retention cysts involving the right maxillary  sinus is noted.    Brain MRI:  Extensive small vessel ischemic disease and multiple  lacunar  infarcts. There is a small acute infarct involving the subcortical white  matter of the right frontal lobe superiolaterally. There is increased  signal intensity on the diffusion weighted sequence without convincing  signal loss on the ADC map involving of the superior aspect of the left  frontal lobe posteriorly as well as. This may represent a T2 shine  through effect or potentially a subacute infarct. It corresponds to an  area of T2 FLAIR and T2 hyperintensity which is new versus 05/01/2018.  The presence of potential infarction multiple vascular distributions  raise the possibility of a proximal or cardiac source.    EEG:  Normal EEG during wakefulness and early drowsiness.  No focal or epileptiform activities were seen.    Hospital Course  This is a 63-year-old male who presented to the emergency room at the behest of his primary care physician after he was found to have worsening dyspnea on exertion and a CT scan of the chest that showed moderate size bilateral pleural effusions and possible right-sided pneumonia along with persistent mild mediastinal lymphadenopathy.  Upon admission he underwent right-sided thoracentesis that demonstrated a transudative process.  He was evaluated by pulmonology and cardiology.  He was felt to have acute exacerbation of heart failure and given diuretics to which he responded nicely.  He is no longer hypoxic and symptoms of shortness of breath are improved.  He has been transitioned to oral diuretics.  Cardiology has cleared him for discharge.  Pulmonology would like to repeat a chest CT in 4 months.  This has been ordered and he will follow-up with Dr. Erich García after this has been performed.  He did have some intermittent confusion prior to hospitalization and was evaluated by neurology who checked an MRI of the brain that showed evidence of an acute stroke as described above.  Looking to the chart it does not appear that neurology has seen him since the results  of his brain MRI.  I have reconsult them this morning to reevaluate his case to determine if he needs any additional workup or adjustment of his medications.  He is on aspirin and Plavix.  Pending neurology clearance for discharge today I anticipate he will be released home.  He is currently medically stable.    I have instructed the patient and written order for him to have a BMP in 1 week with results sent to Dr. Murguia of cardiology.  Additionally he should have a repeat TSH in 6 weeks through his primary care physician's office.      Physical Exam at Discharge:  General: No acute distress, AAOx3  HEENT: EOMI, PERRL  Cardiovascular: +s1 and s2, RRR  Lungs: No rhonchi or wheezing  Abdomen: soft, nontender    Consults:   Consults     Date and Time Order Name Status Description    12/4/2018 0839 Inpatient Neurology Consult Stroke      12/1/2018 0855 Inpatient Pulmonology Consult Completed     11/30/2018 2217 Inpatient Neurology Consult General      11/30/2018 2217 Inpatient Cardiology Consult Completed     11/30/2018 2206 [facility] (on-call MD unless specified) Completed     11/30/2018 2106 [facility] (on-call MD unless specified) Completed             Condition on Discharge: Stable, improved    Discharge Disposition  Home or Self Care    Discharge Medications     Discharge Medications      New Medications      Instructions Start Date   furosemide 40 MG tablet  Commonly known as:  LASIX   40 mg, Oral, Daily      metoprolol succinate  MG 24 hr tablet  Commonly known as:  TOPROL-XL   100 mg, Oral, Every 24 Hours Scheduled         Changes to Medications      Instructions Start Date   docusate sodium 100 MG capsule  Commonly known as:  COLACE  What changed:    · when to take this  · reasons to take this   100 mg, Oral, 2 Times Daily      levothyroxine 75 MCG tablet  Commonly known as:  SYNTHROID, LEVOTHROID  What changed:    · medication strength  · how much to take   75 mcg, Oral, Daily         Continue These Medications       "Instructions Start Date   amLODIPine 5 MG tablet  Commonly known as:  NORVASC   5 mg, Oral, Daily      APIDRA 100 UNIT/ML injection  Generic drug:  insulin glulisine   Subcutaneous, 3 Times Daily Before Meals, Sliding scale  Pt states \"I rarely take it, maybe once a week. I just don't like sticking my self.\"      aspirin 81 MG EC tablet   81 mg, Oral, Daily      atorvastatin 40 MG tablet  Commonly known as:  LIPITOR   40 mg, Oral, Nightly      buPROPion  MG 24 hr tablet  Commonly known as:  WELLBUTRIN XL   150 mg, Oral, Daily      clopidogrel 75 MG tablet  Commonly known as:  PLAVIX   75 mg, Oral, Daily      Insulin Pen Needle 31G X 8 MM misc  Commonly known as:  B-D ULTRAFINE III SHORT PEN   Use to inject 4x daily      RELION GLUCOSE TEST STRIPS test strip  Generic drug:  glucose blood   1 each, Other, 2 Times Daily, Use as instructed       vitamin D 33071 units capsule capsule  Commonly known as:  ERGOCALCIFEROL   Take 1 cap 3 times weekly         Stop These Medications    metoprolol tartrate 100 MG tablet  Commonly known as:  LOPRESSOR            Discharge Diet:   Diet Instructions     Diet: Regular, Consistent Carbohydrate, Cardiac, Specialty Diet; Thin Liquids, No Restrictions; Low Sodium      Discharge Diet:   Regular  Consistent Carbohydrate  Cardiac  Specialty Diet       Fluid Consistency:  Thin Liquids, No Restrictions    Specialty Diets:  Low Sodium          Activity at Discharge:   Activity Instructions     Activity as Tolerated            Follow-up Appointments  Future Appointments   Date Time Provider Department Center   12/13/2018  1:45 PM Albert Wing MD [facility] None   1/31/2019  1:00 PM Nadine Blake DNP, JI [facility] None   2/15/2019  3:00 PM Nadine Blake DNP, APRNEREYDA [facility] None     Additional Instructions for the Follow-ups that You Need to Schedule     Discharge Follow-up with PCP   As directed       Currently Documented PCP:    Jyoti Reynolds MD    PCP " Phone Number:    [phone]     Follow Up Details:  1 week         Discharge Follow-up with Specialty: Dr. Erich García [facility] follow up in office [phone] in four months after ct chest with contrast for lymphadenopathy   As directed      Specialty:  Dr. Erich García [facility] follow up in office [phone] in four months after ct chest with contrast for lymphadenopathy         Discharge Follow-up with Specialty: Neurology stroke clinic in 3 months   As directed      Specialty:  Neurology stroke clinic in 3 months         Discharge Follow-up with Specified Provider: Dr. García of pulmonology in 4 months   As directed      To:  Dr. García of pulmonology in 4 months         Discharge Follow-up with Specified Provider: Dr. Murguia of cardiology in 3-4 weeks   As directed      To:  Dr. Murguia of cardiology in 3-4 weeks         Basic Metabolic Panel    Dec 11, 2018 (Approximate)      CT Chest With Contrast    Apr 08, 2019 (Approximate)            Test Results Pending at Discharge   Order Current Status    Anaerobic Culture - Pleural Fluid, Pleural Cavity In process    Non-gynecologic Cytology In process    Body Fluid Culture - Body Fluid, Pleural Cavity Preliminary result          I have examined and discussed discharge planning with the patient today.     Alexey Haddad MD  12/04/18  8:43 AM    Time: Discharge greater than 30 min

## 2018-12-04 NOTE — PROGRESS NOTES
I see that he is going to be discharged today.  I'm okay with that from my standpoint.  I will arrange for my  to call and set up a follow-up with my nurse practitioner in one week.  He will need a basic metabolic panel this time.  I will see him back in the office in 6 weeks.  Discharge on current medications including Toprol-XL and Lasix (which is new)

## 2018-12-05 ENCOUNTER — READMISSION MANAGEMENT (OUTPATIENT)
Dept: CALL CENTER | Facility: HOSPITAL | Age: 63
End: 2018-12-05

## 2018-12-05 NOTE — NURSING NOTE
Pt discharged home with his belongings and his wife.  Pt refused vital signs at discharge.  Pt discharged.

## 2018-12-05 NOTE — PAYOR COMM NOTE
"Carlito Sanchez (63 y.o. Male)     PLEASE SEE ATTACHED DC SUMMARY    REF#[id number]    THANK YOU    JERALD MCCALLUM LPN [facility]    Date of Birth Social Security Number Address Home Phone MRN    1955  [id number] [place] [address] [phone] [id number]    Mandaen Marital Status          Cheondoism        Admission Date Admission Type Admitting Provider Attending Provider Department, Room/Bed    11/30/18 Emergency Alexey Haddad MD  [address], S503/1    Discharge Date Discharge Disposition Discharge Destination        12/4/2018 Home or Self Care              Attending Provider:  (none)   Allergies:  Fluoxetine, Prozac [Fluoxetine Hcl]    Isolation:  None   Infection:  None   Code Status:  Prior    Ht:  182.9 cm (72\")   Wt:  81.1 kg (178 lb 12.8 oz)    Admission Cmt:  None   Principal Problem:  Acute on chronic combined systolic and diastolic congestive heart failure (CMS/HCC) [I50.43]                 Active Insurance as of 11/30/2018     Primary Coverage     Payor Plan Insurance Group Employer/Plan Group    [employer]HE[employer] MEDICAID ANTH MEDICAID [id number]     Payor Plan Address Payor Plan Phone Number Payor Plan Fax Number Effective Dates    [address] [address] [phone]  7/1/2018 - None Entered    [facility] [address]       Subscriber Name Subscriber Birth Date Member ID       CARLITO SANCHEZ 1955 [id number]                 Emergency Contacts      (Rel.) Home Phone Work Phone Mobile Phone    Lissette Sanchez (Spouse) [phone] -- [phone]               Discharge Summary      Alexey Haddad MD at 12/4/2018  8:43 AM            Date of Admission: 11/30/2018  Date of Discharge:  12/4/2018  Primary Care Physician: Jyoti Reynolds MD     Discharge Diagnosis:  Active Hospital Problems    Diagnosis Date Noted   • **Acute on chronic combined systolic and diastolic congestive heart failure (CMS/HCC) [I50.43] 09/21/2018   • Pleural effusion " [J90] 12/01/2018   • Cerebrovascular accident (CVA) due to embolism of precerebral artery (CMS/[facility]) [I63.10] 02/20/2018   • Acquired hypothyroidism [E03.9] 12/20/2017   • Essential hypertension [I10] 12/20/2016   • CKD (chronic kidney disease) stage 3, GFR 30-59 ml/min (CMS/[facility]) [N18.3] 12/20/2016   • Type 2 diabetes mellitus with ophthalmic complication (CMS/[facility]) [E11.39] 04/25/2016      Resolved Hospital Problems   No resolved problems to display.       DETAILS OF HOSPITAL STAY     Pertinent Test Results and Procedures Performed    Chest x-ray:  There is a moderate-sized subpulmonic right pleural effusion. A similar  sized effusion was present on the previous chest x-ray. This produces  compressive atelectasis of the right lower lobe. Superimposed right  lower lobe pneumonia cannot be excluded. The left lung appears free of  infiltrates. Only a very tiny left pleural effusion is evident. The  heart is normal in size. Stigmata of previous coronary artery bypass  procedure are evident.    Head CT:  The brain and ventricles are symmetrical. There is no evidence  of intracranial hemorrhage, hydrocephalus or of abnormal extra-axial  fluid. Areas of decreased attenuation are appreciated involving the  white matter of the cerebral hemispheres bilaterally, nonspecific but  suggesting small vessel ischemic disease, moderate. The appearance is  similar to the prior examination.     Partially calcified scalp nodules are present in the left parietal  region, unchanged. A mucus retention cysts involving the right maxillary  sinus is noted.    Brain MRI:  Extensive small vessel ischemic disease and multiple lacunar  infarcts. There is a small acute infarct involving the subcortical white  matter of the right frontal lobe superiolaterally. There is increased  signal intensity on the diffusion weighted sequence without convincing  signal loss on the ADC map involving of the superior aspect of the left  frontal lobe posteriorly as  well as. This may represent a T2 shine  through effect or potentially a subacute infarct. It corresponds to an  area of T2 FLAIR and T2 hyperintensity which is new versus 05/01/2018.  The presence of potential infarction multiple vascular distributions  raise the possibility of a proximal or cardiac source.    EEG:  Normal EEG during wakefulness and early drowsiness.  No focal or epileptiform activities were seen.    Hospital Course  This is a 63-year-old male who presented to the emergency room at the behest of his primary care physician after he was found to have worsening dyspnea on exertion and a CT scan of the chest that showed moderate size bilateral pleural effusions and possible right-sided pneumonia along with persistent mild mediastinal lymphadenopathy.  Upon admission he underwent right-sided thoracentesis that demonstrated a transudative process.  He was evaluated by pulmonology and cardiology.  He was felt to have acute exacerbation of heart failure and given diuretics to which he responded nicely.  He is no longer hypoxic and symptoms of shortness of breath are improved.  He has been transitioned to oral diuretics.  Cardiology has cleared him for discharge.  Pulmonology would like to repeat a chest CT in 4 months.  This has been ordered and he will follow-up with Dr. Erich García after this has been performed.  He did have some intermittent confusion prior to hospitalization and was evaluated by neurology who checked an MRI of the brain that showed evidence of an acute stroke as described above.  Looking to the chart it does not appear that neurology has seen him since the results of his brain MRI.  I have reconsult them this morning to reevaluate his case to determine if he needs any additional workup or adjustment of his medications.  He is on aspirin and Plavix.  Pending neurology clearance for discharge today I anticipate he will be released home.  He is currently medically stable.    I have  "instructed the patient and written order for him to have a BMP in 1 week with results sent to Dr. Murguia of cardiology.  Additionally he should have a repeat TSH in 6 weeks through his primary care physician's office.      Physical Exam at Discharge:  General: No acute distress, AAOx3  HEENT: EOMI, PERRL  Cardiovascular: +s1 and s2, RRR  Lungs: No rhonchi or wheezing  Abdomen: soft, nontender    Consults:   Consults     Date and Time Order Name Status Description    12/4/2018 0839 Inpatient Neurology Consult Stroke      12/1/2018 0855 Inpatient Pulmonology Consult Completed     11/30/2018 2217 Inpatient Neurology Consult General      11/30/2018 2217 Inpatient Cardiology Consult Completed     11/30/2018 2206 [facility] (on-call MD unless specified) Completed     11/30/2018 2106 [facility] (on-call MD unless specified) Completed             Condition on Discharge: Stable, improved    Discharge Disposition  Home or Self Care    Discharge Medications     Discharge Medications      New Medications      Instructions Start Date   furosemide 40 MG tablet  Commonly known as:  LASIX   40 mg, Oral, Daily      metoprolol succinate  MG 24 hr tablet  Commonly known as:  TOPROL-XL   100 mg, Oral, Every 24 Hours Scheduled         Changes to Medications      Instructions Start Date   docusate sodium 100 MG capsule  Commonly known as:  COLACE  What changed:    · when to take this  · reasons to take this   100 mg, Oral, 2 Times Daily      levothyroxine 75 MCG tablet  Commonly known as:  SYNTHROID, LEVOTHROID  What changed:    · medication strength  · how much to take   75 mcg, Oral, Daily         Continue These Medications      Instructions Start Date   amLODIPine 5 MG tablet  Commonly known as:  NORVASC   5 mg, Oral, Daily      APIDRA 100 UNIT/ML injection  Generic drug:  insulin glulisine   Subcutaneous, 3 Times Daily Before Meals, Sliding scale  Pt states \"I rarely take it, maybe once a week. I just don't like sticking my self.\"      aspirin " 81 MG EC tablet   81 mg, Oral, Daily      atorvastatin 40 MG tablet  Commonly known as:  LIPITOR   40 mg, Oral, Nightly      buPROPion  MG 24 hr tablet  Commonly known as:  WELLBUTRIN XL   150 mg, Oral, Daily      clopidogrel 75 MG tablet  Commonly known as:  PLAVIX   75 mg, Oral, Daily      Insulin Pen Needle 31G X 8 MM misc  Commonly known as:  B-D ULTRAFINE III SHORT PEN   Use to inject 4x daily      RELION GLUCOSE TEST STRIPS test strip  Generic drug:  glucose blood   1 each, Other, 2 Times Daily, Use as instructed       vitamin D 77255 units capsule capsule  Commonly known as:  ERGOCALCIFEROL   Take 1 cap 3 times weekly         Stop These Medications    metoprolol tartrate 100 MG tablet  Commonly known as:  LOPRESSOR            Discharge Diet:   Diet Instructions     Diet: Regular, Consistent Carbohydrate, Cardiac, Specialty Diet; Thin Liquids, No Restrictions; Low Sodium      Discharge Diet:   Regular  Consistent Carbohydrate  Cardiac  Specialty Diet       Fluid Consistency:  Thin Liquids, No Restrictions    Specialty Diets:  Low Sodium          Activity at Discharge:   Activity Instructions     Activity as Tolerated            Follow-up Appointments  Future Appointments   Date Time Provider Department Center   12/13/2018  1:45 PM Albert Wing MD [facility] None   1/31/2019  1:00 PM Nadine Blake DNP, [facility] None   2/15/2019  3:00 PM Nadine Blake DNP, [facility] None     Additional Instructions for the Follow-ups that You Need to Schedule     Discharge Follow-up with PCP   As directed       Currently Documented PCP:    Jyoti Reynolds MD    PCP Phone Number:    [phone]     Follow Up Details:  1 week         Discharge Follow-up with Specialty: Dr. [facility] follow up in office [phone] in four months after ct chest with contrast for lymphadenopathy   As directed      Specialty:  Dr. [facility] follow  up in office [phone] in four months after ct chest with contrast for lymphadenopathy         Discharge Follow-up with Specialty: Neurology stroke clinic in 3 months   As directed      Specialty:  Neurology stroke clinic in 3 months         Discharge Follow-up with Specified Provider: Dr. García of pulmonology in 4 months   As directed      To:  Dr. García of pulmonology in 4 months         Discharge Follow-up with Specified Provider: Dr. Murguia of cardiology in 3-4 weeks   As directed      To:  Dr. Murguia of cardiology in 3-4 weeks         Basic Metabolic Panel    Dec 11, 2018 (Approximate)      CT Chest With Contrast    Apr 08, 2019 (Approximate)            Test Results Pending at Discharge   Order Current Status    Anaerobic Culture - Pleural Fluid, Pleural Cavity In process    Non-gynecologic Cytology In process    Body Fluid Culture - Body Fluid, Pleural Cavity Preliminary result          I have examined and discussed discharge planning with the patient today.     Alexey Haddad MD  12/04/18  8:43 AM    Time: Discharge greater than 30 min            Electronically signed by Alexey Haddad MD at 12/4/2018  8:47 AM

## 2018-12-05 NOTE — PROGRESS NOTES
Neurology follow-up note    CC: Altered mental status    S: No new symptoms other than some feeling of weakness while standing at the sink shaving.    O: vitals: 145/70-66-18; 97.7  Exam: Patient is awake alert and conversant.  No evidence of any aphasia or apraxia.  Cranial nerves II-12 is intact  Motor: No drift  Cerebellar: Coordination intact    MRI of the brain films and report reviewed.  There are 2 diffusion weighted abnormalities in the cortex 1 on the right one the left.  They're quite small.  They mention ADC mapping abnormality but I am not convinced.  There are T2 lesions in the same locations.  I believe these represent T2 shine through.  The patient's presentation does not fit with stroke and he has no EEG abnormality to go along with it.    Echocardiogram shows reduced ejection fraction with focal hypokinesis of the distal anteroseptal region consistent with old infarction.  EF equal 43%.  Left atrium is dilated.    Hemoglobin A1c was 8.8%.  Cholesterol 124 HDL 49 and LDL 61 paternal strength 72    The patient's previous CT angiogram was reviewed ROM 2/16/18.  There was no hemodynamically significant carotid territory stenosis.  The vertebrobasilar system demonstrates tandem stenoses in the left vertebral artery and moderate stenosis of the right vertebral artery.    A/P:  1.  Altered mental status, multifactorial-resolved.  2.  Rather extensive small vessel disease of the brain.  3.  Poorly controlled diabetes-encourage better control.    4.  Orthostatic hypotension-discussed use of support hose and following supine and standing blood pressures at home.  The patient states he does have a blood pressure monitoring device.    No further investigation is needed from the neurological perspective.  He can be released.

## 2018-12-06 ENCOUNTER — READMISSION MANAGEMENT (OUTPATIENT)
Dept: CALL CENTER | Facility: HOSPITAL | Age: 63
End: 2018-12-06

## 2018-12-06 ENCOUNTER — NURSE TRIAGE (OUTPATIENT)
Dept: CALL CENTER | Facility: HOSPITAL | Age: 63
End: 2018-12-06

## 2018-12-06 LAB
BACTERIA FLD CULT: NORMAL
BACTERIA SPEC ANAEROBE CULT: NORMAL
GRAM STN SPEC: NORMAL
GRAM STN SPEC: NORMAL

## 2018-12-06 NOTE — TELEPHONE ENCOUNTER
"He was having trouble reaching Dr. Trujillo office, . This nurse called the number on discharge papers,  said they are having phone issues today not all calls are making it through, caleb pt. To keep trying this number [phone] for the appt.     Reason for Disposition  • Health Information question, no triage required and triager able to answer question    Additional Information  • Negative: [1] Caller is not with the adult (patient) AND [2] reporting urgent symptoms  • Negative: Lab result questions  • Negative: Medication questions  • Negative: Caller cannot be reached by phone  • Negative: Caller has already spoken to PCP or another triager  • Negative: RN needs further essential information from caller in order to complete triage  • Negative: Requesting regular office appointment  • Negative: [1] Caller requesting NON-URGENT health information AND [2] PCP's office is the best resource  • Negative: General information question, no triage required and triager able to answer question  • Negative: Question about upcoming scheduled test, no triage required and triager able to answer question  • Negative: [1] Caller is not with the adult (patient) AND [2] probable NON-URGENT symptoms    Answer Assessment - Initial Assessment Questions  1. REASON FOR CALL or QUESTION: \"What is your reason for calling today?\" or \"How can I best help you?\" or \"What question do you have that I can help answer?\"      Needing information on Dr. García, Dr. Trujillo    Protocols used: INFORMATION ONLY CALL-ADULT-      "

## 2018-12-06 NOTE — OUTREACH NOTE
CHF Week 1 Survey      Responses   Facility patient discharged from?  [facility]   Does the patient have one of the following disease processes/diagnoses(primary or secondary)?  CHF   Is there a successful TCM telephone encounter documented?  No   CHF Week 1 attempt successful?  Yes   Call start time  1456   Rescheduled  Rescheduled-pt requested [driving, requested to reschedule]   Call end time  1457   Discharge diagnosis  Acute on chronic combined systolic and diastolic congestive heart failure          Amber Mcmillan RN

## 2018-12-06 NOTE — OUTREACH NOTE
Prep Survey      Responses   Facility patient discharged from?  [facility]   Is patient eligible?  Yes   Discharge diagnosis  Acute on chronic combined systolic and diastolic congestive heart failure   Does the patient have one of the following disease processes/diagnoses(primary or secondary)?  CHF   Does the patient have Home health ordered?  No   Is there a DME ordered?  No   Prep survey completed?  Yes          Cara Swann RN

## 2018-12-08 ENCOUNTER — READMISSION MANAGEMENT (OUTPATIENT)
Dept: CALL CENTER | Facility: HOSPITAL | Age: 63
End: 2018-12-08

## 2018-12-08 NOTE — OUTREACH NOTE
CHF Week 1 Survey      Responses   Facility patient discharged from?  [facility]   Does the patient have one of the following disease processes/diagnoses(primary or secondary)?  CHF   Is there a successful TCM telephone encounter documented?  No   CHF Week 1 attempt successful?  No   Rescheduled  Revoked [no answer after asking to reschedue]          Jason Rehman RN

## 2018-12-12 ENCOUNTER — OFFICE VISIT (OUTPATIENT)
Dept: CARDIOLOGY | Facility: CLINIC | Age: 63
End: 2018-12-12

## 2018-12-12 VITALS
SYSTOLIC BLOOD PRESSURE: 122 MMHG | BODY MASS INDEX: 23.03 KG/M2 | DIASTOLIC BLOOD PRESSURE: 70 MMHG | HEIGHT: 72 IN | WEIGHT: 170 LBS | HEART RATE: 85 BPM

## 2018-12-12 DIAGNOSIS — J90 PLEURAL EFFUSION: ICD-10-CM

## 2018-12-12 DIAGNOSIS — I63.9 CEREBROVASCULAR ACCIDENT (CVA), UNSPECIFIED MECHANISM (HCC): ICD-10-CM

## 2018-12-12 DIAGNOSIS — I10 ESSENTIAL HYPERTENSION: ICD-10-CM

## 2018-12-12 DIAGNOSIS — Z79.4 TYPE 2 DIABETES MELLITUS WITH MODERATE NONPROLIFERATIVE RETINOPATHY WITHOUT MACULAR EDEMA, WITH LONG-TERM CURRENT USE OF INSULIN, UNSPECIFIED LATERALITY (HCC): ICD-10-CM

## 2018-12-12 DIAGNOSIS — I50.42 CHRONIC COMBINED SYSTOLIC AND DIASTOLIC CONGESTIVE HEART FAILURE (HCC): Primary | ICD-10-CM

## 2018-12-12 DIAGNOSIS — N18.30 CKD (CHRONIC KIDNEY DISEASE) STAGE 3, GFR 30-59 ML/MIN (HCC): ICD-10-CM

## 2018-12-12 DIAGNOSIS — I25.9 CHRONIC ISCHEMIC HEART DISEASE: ICD-10-CM

## 2018-12-12 DIAGNOSIS — E11.3399 TYPE 2 DIABETES MELLITUS WITH MODERATE NONPROLIFERATIVE RETINOPATHY WITHOUT MACULAR EDEMA, WITH LONG-TERM CURRENT USE OF INSULIN, UNSPECIFIED LATERALITY (HCC): ICD-10-CM

## 2018-12-12 PROBLEM — I50.9 CHRONIC CHF (CONGESTIVE HEART FAILURE): Status: ACTIVE | Noted: 2018-12-12

## 2018-12-12 PROCEDURE — 93000 ELECTROCARDIOGRAM COMPLETE: CPT | Performed by: NURSE PRACTITIONER

## 2018-12-12 PROCEDURE — 99214 OFFICE O/P EST MOD 30 MIN: CPT | Performed by: NURSE PRACTITIONER

## 2018-12-12 NOTE — PROGRESS NOTES
Date of Office Visit: 2018  Encounter Provider: Nadine Blake, JULIO C, APRN  Place of Service: [facility] CARDIOLOGY  Patient Name: Carlito Mo  :1955      Subjective:     Chief Complaint:  Hospital follow-up, hypertension, CHF.    History of Present Illness:  Carlito Mo is a 63 y.o. male patient of Dr. Murguia.    Patient has a history of coronary artery disease, congestive heart failure, respiratory failure, chronic kidney disease, hypothyroidism, type 2 diabetes.     Patient was first seen by Dr. Murguia 2017 when he was admitted with altered mental status.  He was diagnosed with multiple small acute infarcts.  Echocardiogram 2017 showed mild left ventricular hypertrophy, mild left atrial enlargement, negative bubble study, normal LV systolic function with an ejection fraction of 60%.  Transesophageal echo 2017 showed that his LV function was mildly low with an ejection fraction of 45% with focal wall motion abnormalities primarily in the septum and anterior wall and apex.  Small patent foramen ovale was noted and no significant valvular heart disease or cardiac source of emboli minus a small PFO were seen.  Holter monitor showed a couple of short bursts of SVT but no sustained arrhythmia.     He came back to the hospital in 2017 again with altered mental status.  At this time he had an echocardiogram which shows ejection fraction was 63%, grade 1 diastolic dysfunction and no significant valvular heart disease.  He wore a Zio patch which showed PACs, PVCs and nonsustained SVT.     He was back in the hospital in May 2018 with acute mental status changes and a blood pressure of 212/109.  He was noted at that time to have a chronically elevated troponin without acute EKG changes.     Patient was last seen in office by Dr. Murguia 18.  At this point it was noted that this was the first time he had been seen in the office.  His biggest  complaint was fatigue.  His wife thought that he may be depressed.  He was not sleeping well at night.  He would stay home a lot in bed.  He reported shortness of breath both at rest and with exertion.  He denied chest pain or palpitations.  He reported some chronic left leg swelling but no acute edema.  He was instructed to follow-up in 6 months or sooner if needed.     Patient was admitted to the hospital 9/19/18-9/24/18 due to postoperative hypoxia and persistent lethargy after having a colonoscopy.  He underwent workup for the hypoxia, including chest x-ray followed by chest CT and these were suggestive of pneumonia.  Patient had a significantly elevated pro calcitonin and white blood cell count.  He was started on Zosyn and responded well to antibiotics.  He was transitioned to Augmentin to complete one week total course of antibiotics.  He was seen by cardiology for potential heart failure exacerbation.  He was found to have a reduced ejection fraction compared with prior studies and was felt to have mild exacerbation of heart failure.  Patient with diuresis.  He was able to be taken off of oxygen during the day and only required 2 L at night.  He was prescribed oxygen to wear at nighttime upon discharge.  He had some urinary retention and was seen by urology and started on Flomax.  Patient was felt to be medically stable.  He was discharged home and instructed to follow-up outpatient with gastroenterology, cardiology, and primary care.     Patient presented to office 10/16/18 for hospital follow-up appointment.  Patient reported he had been feeling tired since hospital discharge due to his wife recently braking her leg so he had been having to take care of her as well as taking care of their special needs son.  Patient reported a history of some chronic intermittent chest wall pain and muscle soreness that occurs to a scar that he has; reproducible with palpation.  He denied any chest pain with activities.   He had a history of some shortness of breath with activities and reported that this was about the same as it was since hospital discharge, not worsening.  He had a history of some mild intermittent dependent edema. Patient denied any palpitations, racing heartbeat sensation, syncope, near-syncope, recent falls, abnormal bleeding.  Patient was discharged on Lasix 40 mg twice a day, however he thought that he may only be taking the Lasix once a day.  He was asked to check Lasix dose when he got home.  Patient's blood pressure was elevated in the office however he reported he had not taken his blood pressure medications yet and that his blood pressure outside the office has been staying closer to 140/90.  At this point sleep medicine referral was recommended to rule out sleep apnea.  Patient had a follow-up appointment with primary care provider 10/30/18 and was asked to keep this appointment.  Patient to continue to monitor blood pressure regularly, keep a log, and call office with readings in about 2 weeks.    Patient was seen in the office for follow-up by Dr. Murguia 11/14/18.  At this point it was noted that he appeared a bit more unkempt than usual.  He felt like his breathing was better.  He still had occasional edema.  This was better when he uses Lasix.  He had been using oxygen at night on an as-needed basis if he felt short of breath.  He was no longer having chest pain.  He was instructed to follow-up in office in 3 months or sooner if needed.    Patient presented to the ER 11/20/18 with intermittent dyspnea on exertion.  He had a CT of his chest at [facility] that showed bilateral pleural effusions and pulmonary edema.  He was referred to the ED.  Upon arrival blood pressure was 149/83.  Heart rate 62 bpm.  EKG showed sinus bradycardia.  He was treated with IV Lasix and admitted for further evaluation.  He mainly noticed the shortness of breath with activities, and felt like it had been worsening over the last  several weeks.  He had not had any true chest pain, although he did have some pressure when he was short of breath.  He also seemed confused at times, although this had been noted in the past as well.  He diuresed well in the hospital.  His condition improved.  He was changed to metoprolol succinate since tartrate was not dictated for CHF.    Patient had echocardiogram done 12/2/18:  Interpretation Summary   · There is severe hypokinesis of the distal anteroseptum and the anterior apex.  · Left ventricular systolic function is mildly decreased. Calculated EF = 43%  · Normal right ventricular systolic function noted. Right ventricular cavity is mildly dilated.  · Left atrial cavity size is dilated.  · The mitral valve leaflets are thickened and myxomatous. There is mild bileaflet mitral valve prolapse.  · Mild mitral valve regurgitation is present  · There is a trivial pericardial effusion.       Patient presents to office today for hospital follow-up appointment.  Patient's wife is with him in the office today, per patient preference.  Patient reports he still feels tired and somewhat weak since hospital discharge.  He denies any new or worsening symptoms.  He continues to have shortness of breath with exertion, even with going down the stairs, however he reports that this is not worsening since hospital discharge and it is not occurring at rest.  He uses oxygen at night.  He continues to have some lower extremity edema, however it has not worsened since hospital discharge.  He actually has very little edema present on exam today.  He feels some intermittent dizziness that occurs both at rest and with activities, although it does seem worse when he is standing for long periods of time.  He denies any chest pain, PND, cough, wheezing, palpitations, racing heartbeat sensation, syncope, falls, abnormal bleeding.  He appears stable and pretty euvolemic in office today.  Patient has not been monitoring blood pressure,  heart rate, or weight since hospital discharge.  He was given a blood pressure log in office today and asked to record daily blood pressure readings 1-2 hours after morning medications, as well as recording his heart rate and weight.  If he gains 3 pounds in one day or 5 pounds in a week he is to call the office as he may need an extra dose of diuretic.  He has seen his primary care provider's partner since hospital discharge.  He will bring his blood pressure cuff to his next office visit with us as we can check it against a manual reading to ensure it is accurate.  He has a follow-up appointment with pulmonology January 10, a follow-up appointment with nephrology January 15, an appointment with neurology March 9.  He reports that he could not get into see neurology any sooner than this, though he reports he is on a waiting list for sooner appointment.    Patient to schedule 6 week hospital follow-up appointment with Dr. Murguia or follow-up sooner if needed for any new, recurrent, or worsening symptoms or other problems/concerns.        Past Medical History:   Diagnosis Date   • Acquired hypothyroidism    • Acute congestive heart failure (CMS/HCC)    • Acute respiratory failure with hypoxia (CMS/HCC)    • Acute sinusitis    • SYLVAIN (acute kidney injury) (CMS/HCC)     on CKD   • Anemia    • Arthritis    • Cancer (CMS/HCC)     skin   • Chronic ischemic heart disease    • CKD (chronic kidney disease)    • Depression    • Diabetes mellitus type 2, uncontrolled, without complications (CMS/HCC)    • Disease of thyroid gland    • Fatigue    • Heart attack (CMS/HCC)    • History of coronary artery disease     with remote history of bypass surgery in 2001   • History of transfusion    • Hyperglycemia    • Hyperlipidemia    • Hypertension    • Hypertensive encephalopathy    • Mental status change     Acute   • Pleural effusion    • Pneumonia    • RBBB (right bundle branch block)    • Screening for prostate cancer 2011   • Stroke  "(CMS/[facility])    • TIA (transient ischemic attack)    • Type 2 diabetes mellitus (CMS/[facility])    • Urinary retention    • Vitamin D deficiency      Past Surgical History:   Procedure Laterality Date   • APPENDECTOMY N/A 02/14/2016    Dr. Alexey Dodson   • COLONOSCOPY      over 20 years ago.  no polyps    • CORONARY ARTERY BYPASS GRAFT  11/2001   • TONSILLECTOMY     • VASECTOMY       Outpatient Medications Prior to Visit   Medication Sig Dispense Refill   • amLODIPine (NORVASC) 5 MG tablet Take 1 tablet by mouth Daily for 180 days. 90 tablet 1   • aspirin 81 MG EC tablet Take 1 tablet by mouth Daily for 180 days. 90 tablet 1   • atorvastatin (LIPITOR) 40 MG tablet Take 1 tablet by mouth Every Night for 180 days. 90 tablet 1   • buPROPion XL (WELLBUTRIN XL) 150 MG 24 hr tablet Take 1 tablet by mouth Daily for 180 days. 90 tablet 1   • clopidogrel (PLAVIX) 75 MG tablet Take 1 tablet by mouth Daily for 180 days. 90 tablet 1   • docusate sodium (COLACE) 100 MG capsule Take 1 capsule by mouth 2 (two) times a day. (Patient taking differently: Take 100 mg by mouth As Needed for Constipation.) 30 capsule 0   • furosemide (LASIX) 40 MG tablet Take 1 tablet by mouth Daily for 30 days. 30 tablet 0   • insulin glulisine (APIDRA) 100 UNIT/ML injection Inject  under the skin into the appropriate area as directed 3 (Three) Times a Day Before Meals. Sliding scale    Pt states \"I rarely take it, maybe once a week. I just don't like sticking my self.\"     • Insulin Pen Needle (B-D ULTRAFINE III SHORT PEN) 31G X 8 MM misc Use to inject 4x daily 150 each 5   • levothyroxine (SYNTHROID, LEVOTHROID) 75 MCG tablet Take 1 tablet by mouth Daily for 30 doses. 30 tablet 0   • metoprolol succinate XL (TOPROL-XL) 100 MG 24 hr tablet Take 1 tablet by mouth Daily for 30 days. 30 tablet 0   • RELION GLUCOSE TEST STRIPS test strip 1 each by Other route 2 (two) times a day. Use as instructed     • vitamin D (ERGOCALCIFEROL) 95881 units capsule capsule " Take 1 cap 3 times weekly 24 capsule 1     No facility-administered medications prior to visit.        Allergies as of 12/12/2018 - Reviewed 12/12/2018   Allergen Reaction Noted   • Fluoxetine Other (See Comments) 03/12/2018   • Prozac [fluoxetine hcl] Other (See Comments) 12/17/2015     Social History     Socioeconomic History   • Marital status:      Spouse name: Not on file   • Number of children: Not on file   • Years of education: Not on file   • Highest education level: Not on file   Social Needs   • Financial resource strain: Not on file   • Food insecurity - worry: Not on file   • Food insecurity - inability: Not on file   • Transportation needs - medical: Not on file   • Transportation needs - non-medical: Not on file   Occupational History   • Not on file   Tobacco Use   • Smoking status: Never Smoker   • Smokeless tobacco: Never Used   • Tobacco comment: daily caffiene   Substance and Sexual Activity   • Alcohol use: No   • Drug use: No   • Sexual activity: Defer   Other Topics Concern   • Not on file   Social History Narrative   • Not on file     Family History   Problem Relation Age of Onset   • Diabetes Mother    • Hypertension Mother    • Macular degeneration Mother    • Alcohol abuse Father    • Cancer Father         lung   • Alcohol abuse Brother        Review of Systems   Constitution: Positive for malaise/fatigue and weight loss (3 lb). Negative for chills, fever, night sweats and weight gain.   HENT: Negative for ear pain, hearing loss, nosebleeds and sore throat.    Eyes: Positive for blurred vision. Negative for double vision, redness, vision loss in left eye and vision loss in right eye.   Cardiovascular: Positive for leg swelling.   Respiratory: Positive for shortness of breath. Negative for cough, hemoptysis, snoring and wheezing.    Endocrine: Negative for cold intolerance and heat intolerance.   Skin: Positive for itching and rash. Negative for color change and poor wound healing.  "  Musculoskeletal: Negative for joint pain, joint swelling and myalgias.   Gastrointestinal: Negative for abdominal pain, diarrhea, hematemesis, melena, nausea and vomiting.   Genitourinary: Positive for decreased libido and frequency. Negative for dysuria and hematuria.        ED   Neurological: Positive for dizziness. Negative for headaches, numbness, paresthesias and seizures.        Hx stroke   Psychiatric/Behavioral: Positive for depression. Negative for altered mental status. The patient is not nervous/anxious.           Objective:     Vitals:    12/12/18 1129   BP: 122/70   BP Location: Right arm   Pulse: 85   Weight: 77.1 kg (170 lb)   Height: 182.9 cm (72\")     Body mass index is 23.06 kg/m².      PHYSICAL EXAM:  Physical Exam   Constitutional: He is oriented to person, place, and time. He appears well-developed and well-nourished. No distress.   HENT:   Head: Normocephalic and atraumatic.   Eyes: Pupils are equal, round, and reactive to light.   Neck: Neck supple. No JVD present. Carotid bruit is not present. No tracheal deviation present.   Cardiovascular: Normal rate, regular rhythm and intact distal pulses. Exam reveals no gallop and no friction rub.   Murmur (Grade I/VI systolic murmur throughout ) heard.  Pulses:       Radial pulses are 2+ on the right side, and 2+ on the left side.        Posterior tibial pulses are 2+ on the right side, and 2+ on the left side.   Pulmonary/Chest: Effort normal and breath sounds normal. No respiratory distress. He has no wheezes. He has no rales.   Abdominal: Soft. Bowel sounds are normal. He exhibits no distension. There is no tenderness. There is no guarding.   Musculoskeletal: He exhibits edema (Trace sockline edema). He exhibits no tenderness or deformity.   Neurological: He is alert and oriented to person, place, and time.   Skin: Skin is warm and dry. No rash noted. He is not diaphoretic. No erythema.   Psychiatric: He has a normal mood and affect. His behavior " is normal. Judgment normal.         ECG 12 Lead  Date/Time: 12/12/2018 11:46 AM  Performed by: Nadine Blake DNP, APRN  Authorized by: Nadine Blake DNP, JI   Comparison: compared with previous ECG from 11/30/2018  Similar to previous ECG  Rhythm: sinus rhythm  Rate: normal  BPM: 85  Conduction: right bundle branch block  Other findings comments: Probable prior anterospetal infart- also seen on previous ECG  Clinical impression: abnormal ECG            Assessment:       Diagnosis Plan   1. Chronic combined systolic and diastolic congestive heart failure (CMS/HCC)     2. Essential hypertension     3. Chronic ischemic heart disease     4. Cerebrovascular accident (CVA), unspecified mechanism (CMS/[facility])     5. CKD (chronic kidney disease) stage 3, GFR 30-59 ml/min (CMS/[facility])     6. Pleural effusion     7. 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 Chronic systolic congestive heart failure: Patient appears pretty euvolemic in the office today.  Only trace sock line edema present.  No pitting edema.  Shortness of breath has not worsened since hospital discharge.  He is no longer short of breath at rest.  No PND.  We will continue his 40 mg of Lasix daily.  Patient is to monitor heart rate, blood pressure, and daily weights.  He will call the office if weight increases 3 pounds in 1 day or 5 pounds in a week, at which point another diuretic pill may be indicated.  2. Hypertension: Blood pressure well controlled in the office today at 122/70 mmHg.  No ACE inhibitor or angiotensin receptor blocker secondary to kidney disease.  Patient reports he has not been checking blood pressure recently at home.  He will start keeping a log and call if it is staying above 130 systolic or above 80 diastolic.  He will bring blood pressure cuff to next appointment.  3. Coronary artery  "disease: Status post CABG in 2001.  4. Stroke: History of multiple small strokes in November 2017.  Diagnosed on this admission with a small acute stroke.  Patient has appointment with neurology March 9.  He also reports he is on a waiting list for us sooner neurology follow-up.  5. Chronic kidney disease.  Patient has appointment with nephrology 1/15/18.  6. Chronically elevated troponin.  7. Diabetes: Managed by outside provider.  8. Pleural effusion: 2 L drained from right lung on 12/1/18.  Patient has appointment with pulmonology 1/10/18.    Patient to schedule 6 week hospital follow-up appointment with Dr. Murguia or follow-up sooner if needed for any new, recurrent, or worsening symptoms or other problems/concerns.           Your medication list           Accurate as of 12/12/18  1:02 PM. If you have any questions, ask your nurse or doctor.               CHANGE how you take these medications      Instructions Last Dose Given Next Dose Due   docusate sodium 100 MG capsule  Commonly known as:  COLACE  What changed:    · when to take this  · reasons to take this      Take 1 capsule by mouth 2 (two) times a day.          CONTINUE taking these medications      Instructions Last Dose Given Next Dose Due   amLODIPine 5 MG tablet  Commonly known as:  NORVASC      Take 1 tablet by mouth Daily for 180 days.       APIDRA 100 UNIT/ML injection  Generic drug:  insulin glulisine      Inject  under the skin into the appropriate area as directed 3 (Three) Times a Day Before Meals. Sliding scale    Pt states \"I rarely take it, maybe once a week. I just don't like sticking my self.\"       aspirin 81 MG EC tablet      Take 1 tablet by mouth Daily for 180 days.       atorvastatin 40 MG tablet  Commonly known as:  LIPITOR      Take 1 tablet by mouth Every Night for 180 days.       buPROPion  MG 24 hr tablet  Commonly known as:  WELLBUTRIN XL      Take 1 tablet by mouth Daily for 180 days.       clopidogrel 75 MG " tablet  Commonly known as:  PLAVIX      Take 1 tablet by mouth Daily for 180 days.       furosemide 40 MG tablet  Commonly known as:  LASIX      Take 1 tablet by mouth Daily for 30 days.       Insulin Pen Needle 31G X 8 MM misc  Commonly known as:  B-D ULTRAFINE III SHORT PEN      Use to inject 4x daily       levothyroxine 75 MCG tablet  Commonly known as:  SYNTHROID, LEVOTHROID      Take 1 tablet by mouth Daily for 30 doses.       metoprolol succinate  MG 24 hr tablet  Commonly known as:  TOPROL-XL      Take 1 tablet by mouth Daily for 30 days.       RELION GLUCOSE TEST STRIPS test strip  Generic drug:  glucose blood      1 each by Other route 2 (two) times a day. Use as instructed       vitamin D 14804 units capsule capsule  Commonly known as:  ERGOCALCIFEROL      Take 1 cap 3 times weekly          I did not stop or change the above medications.  Patient's medication list was updated to reflect medications he is currently taking including medication changes made by other providers.           Nadine Blake, JULIO C, APRN  12/12/2018       Dictated utilizing Dragon dictation

## 2018-12-13 ENCOUNTER — OFFICE VISIT (OUTPATIENT)
Dept: SLEEP MEDICINE | Facility: HOSPITAL | Age: 63
End: 2018-12-13
Attending: INTERNAL MEDICINE

## 2018-12-13 ENCOUNTER — TELEPHONE (OUTPATIENT)
Dept: NEUROLOGY | Facility: CLINIC | Age: 63
End: 2018-12-13

## 2018-12-13 VITALS
WEIGHT: 170 LBS | HEIGHT: 72 IN | HEART RATE: 79 BPM | BODY MASS INDEX: 23.03 KG/M2 | DIASTOLIC BLOOD PRESSURE: 59 MMHG | SYSTOLIC BLOOD PRESSURE: 115 MMHG

## 2018-12-13 DIAGNOSIS — I63.10 CEREBROVASCULAR ACCIDENT (CVA) DUE TO EMBOLISM OF PRECEREBRAL ARTERY (HCC): Primary | ICD-10-CM

## 2018-12-13 DIAGNOSIS — R06.83 SNORING: ICD-10-CM

## 2018-12-13 DIAGNOSIS — G47.33 OSA (OBSTRUCTIVE SLEEP APNEA): ICD-10-CM

## 2018-12-13 DIAGNOSIS — I50.43 ACUTE ON CHRONIC COMBINED SYSTOLIC AND DIASTOLIC CONGESTIVE HEART FAILURE (HCC): ICD-10-CM

## 2018-12-13 DIAGNOSIS — I10 ESSENTIAL HYPERTENSION: ICD-10-CM

## 2018-12-13 DIAGNOSIS — R29.818 SUSPECTED SLEEP APNEA: ICD-10-CM

## 2018-12-13 PROCEDURE — G0463 HOSPITAL OUTPT CLINIC VISIT: HCPCS

## 2018-12-13 NOTE — PROGRESS NOTES
[facility] SLEEP MEDICINE      Carlito Mo  63 y.o.  male  1955    PCP:Jyoti Reynolds MD  Referring physician: Amber Murguia MD      Type of service: Initial consult.    Chief complaint: Snoring, nocturnal hypoxia, recent history of stroke and congestive heart failure      History of present illness;  This is a 63 y.o. male being referred for evaluation of sleep apnea.  The patient reports symptoms of snoring, daytime excessive sleepiness and fatigue.  In addition patient also gives a history of waking up choking and gasping for breath.  Normally goes to bed around 11p.m. and wakes up around 8 a.m, still feels not rested well and tired.     Patient has multiple medical problems which includes congestive heart failure with ejection fraction of 43%.  History of pleural effusion secondary to heart failure and underwent thoracenteses.  On also he has multiple strokes.  While in the hospital he was also found to have nonsteroidal hypoxic respiratory failure and was sent home on oxygen until the sleep studies done.      Past Medical History:   Diagnosis Date   • Acquired hypothyroidism    • Acute congestive heart failure (CMS/HCC)    • Acute respiratory failure with hypoxia (CMS/HCC)    • Acute sinusitis    • SYLVAIN (acute kidney injury) (CMS/HCC)     on CKD   • Anemia    • Arthritis    • Cancer (CMS/HCC)     skin   • Chronic ischemic heart disease    • CKD (chronic kidney disease)    • Depression    • Diabetes mellitus type 2, uncontrolled, without complications (CMS/HCC)    • Disease of thyroid gland    • Fatigue    • Heart attack (CMS/HCC)    • History of coronary artery disease     with remote history of bypass surgery in 2001   • History of transfusion    • Hyperglycemia    • Hyperlipidemia    • Hypertension    • Hypertensive encephalopathy    • Mental status change     Acute   • Pleural effusion    • Pneumonia    • RBBB (right bundle branch block)    • Screening for prostate cancer 2011   •  "Stroke (CMS/[facility])    • TIA (transient ischemic attack)    • Type 2 diabetes mellitus (CMS/[facility])    • Urinary retention    • Vitamin D deficiency        Medications:    Current Outpatient Medications:   •  amLODIPine (NORVASC) 5 MG tablet, Take 1 tablet by mouth Daily for 180 days., Disp: 90 tablet, Rfl: 1  •  aspirin 81 MG EC tablet, Take 1 tablet by mouth Daily for 180 days., Disp: 90 tablet, Rfl: 1  •  atorvastatin (LIPITOR) 40 MG tablet, Take 1 tablet by mouth Every Night for 180 days., Disp: 90 tablet, Rfl: 1  •  buPROPion XL (WELLBUTRIN XL) 150 MG 24 hr tablet, Take 1 tablet by mouth Daily for 180 days., Disp: 90 tablet, Rfl: 1  •  clopidogrel (PLAVIX) 75 MG tablet, Take 1 tablet by mouth Daily for 180 days., Disp: 90 tablet, Rfl: 1  •  docusate sodium (COLACE) 100 MG capsule, Take 1 capsule by mouth 2 (two) times a day. (Patient taking differently: Take 100 mg by mouth As Needed for Constipation.), Disp: 30 capsule, Rfl: 0  •  furosemide (LASIX) 40 MG tablet, Take 1 tablet by mouth Daily for 30 days., Disp: 30 tablet, Rfl: 0  •  insulin glulisine (APIDRA) 100 UNIT/ML injection, Inject  under the skin into the appropriate area as directed 3 (Three) Times a Day Before Meals. Sliding scale  Pt states \"I rarely take it, maybe once a week. I just don't like sticking my self.\", Disp: , Rfl:   •  Insulin Pen Needle (B-D ULTRAFINE III SHORT PEN) 31G X 8 MM misc, Use to inject 4x daily, Disp: 150 each, Rfl: 5  •  levothyroxine (SYNTHROID, LEVOTHROID) 75 MCG tablet, Take 1 tablet by mouth Daily for 30 doses., Disp: 30 tablet, Rfl: 0  •  metoprolol succinate XL (TOPROL-XL) 100 MG 24 hr tablet, Take 1 tablet by mouth Daily for 30 days., Disp: 30 tablet, Rfl: 0  •  RELION GLUCOSE TEST STRIPS test strip, 1 each by Other route 2 (two) times a day. Use as instructed, Disp: , Rfl:   •  vitamin D (ERGOCALCIFEROL) 30205 units capsule capsule, Take 1 cap 3 times weekly, Disp: 24 capsule, Rfl: 1    Social history:  Shift work: " "no  Tobacco use: no  Alcohol use: no  Caffeinated drinks: 2  Over-the-counter sleeping aid: no  Narcotic medications: no    Review of systems:  [place] Sleepiness Scale: Total score: 6   Positive for snoring, witnessed apneas, fatigue and daytime excessive sleepiness,   Negative for shortness of breath, cough, wheezing, chest pain, nausea and vomiting, Swelling of feet  Morning symptoms  Dry mouth yes  Moring headaches no  Nasal Congestion no  Leg movements no  Nocturia (how many times/night) 2-4  Memory Problems yes    Physical exam:  Vitals:    12/13/18 1300   BP: 115/59   Pulse: 79   Weight: 77.1 kg (170 lb)   Height: 182.9 cm (72\")    Body mass index is 23.06 kg/m². Neck Circumference: 14 inches  HEENT: Head is atraumatic, pupils are round equal and reacting to light,  no nasal septal defects or deviation and the nasal passages are clear, tonsils are not enlarged, oral airway Mallampati class III  NECK: No lymphadenopathy, trachea is in the midline  RESPIRATORY SYSTEM: Breath sounds are equal on both sides, there are no wheezes or crackles  CARDIOVASULAR SYSTEM: Heart sounds are regular and normal, there are no murmurs or thrills  ABDOMEN: Soft, no hepatosplenomegaly, no evidence of ascites  EXTREMITES: No cyanosis, clubbing. 1+ edema  NEUROLOGICAL SYSTEM: Oriented x 3, no gross neurological defects    Assessment and plan:  · Mixed sleep apnea:  I strongly suspect that the patient has mixed sleep apnea which includes both obstructive and central apnea.  He has multiple risk factors, which includes heart failure, stroke and nocturnal hypoxia.  He needs a split night sleep study to evaluate his sleep apnea.  If he has obstructive and central sleep apnea he may need a BiPAP with the backup rate or ASV titration to treat his sleep apnea.  I have scheduled a in lab split night study.  Once the studies done, I would recommend further treatment  · Congestive heart failure  · History of stroke  · Nighttime hypoxia  · Hx " of  pleural effusion secondary to heart failure      Albert Wing MD, FCCP  Pulmonary, Critical Care and sleep Medicine

## 2018-12-13 NOTE — TELEPHONE ENCOUNTER
----- Message from Jenifer Theodore sent at 12/7/2018 12:12 PM EST -----  Contact: Patient  Pt called to [facility] appt w/Dr Trujillo for today.  Pt sts his d/c paperwork says to call and make a f/u w/Cassandra in 3 days from d/c.  I see one is [facility] with Alisia on 03/09/19 as a 3 mo f/u.    Pt sts he is feeling okay other than feeling a little week.

## 2018-12-13 NOTE — TELEPHONE ENCOUNTER
I s/w pt and scheduled his hospital f/u appt with Alisia on 1/16/19 at 4pm. Mailed packet today as well. Pt requested earlier appt then 3 months due to his weakness. I let him know to contact me anytime with any questions/concerns.

## 2018-12-17 ENCOUNTER — TELEPHONE (OUTPATIENT)
Dept: CARDIOLOGY | Facility: CLINIC | Age: 63
End: 2018-12-17

## 2018-12-17 NOTE — TELEPHONE ENCOUNTER
Called pt and told him to take Lasix BID until the weight comes off and then go back to QD. And to call back if the weight continues to rise.    Pt agreeable.    Maryanne Costa RN

## 2018-12-17 NOTE — TELEPHONE ENCOUNTER
12/17/18  11:26 AM  Carlito Mo  1955    Home Phone [phone]   Mobile [phone]       Carlito Mo is a patient of Dr. Murguia, calling in w/SOB, dizziness and a 5-lb weight gain from 12/13/18-12/17/18.    Current BP/HR/weights are:    12/12 122/70 85   12/13 109/73 85 162.4lb  12/14 131/89 87 164.8  12/15 140/86 77 165.8  12/16 122/77 83 165.6  12/17 114/75 92 167.6    His current cardiac meds are:    Furosemide 40mg daily  Metoprolol succinate XL 100mg daily  Amlodipine 5mg daily    Does he need to double up on his Lasix for a day or two?    He is scheduled to be seen on 1/18/19 by Dr. Murguia.    Maryanne Costa RN

## 2018-12-17 NOTE — TELEPHONE ENCOUNTER
Increase lasix to BID until weight back to normal and then back to QD. Call back if weight continues to go up

## 2018-12-26 ENCOUNTER — TELEPHONE (OUTPATIENT)
Dept: CARDIOLOGY | Facility: CLINIC | Age: 63
End: 2018-12-26

## 2018-12-26 NOTE — TELEPHONE ENCOUNTER
Spoke with patient. He has some medications at [facility] pharmacy that he needs to . He plans to do that by Thursday. He has not been following good diet the past couple days with the holiday. He evi increase lasix x 2 days from 40 mg to 60 mg. He will continue on losartan 100 mg daily now. He does not have Toprol Xl.     He is to keep following his weights daily. Will call him back on Friday to reassess his symptoms and revisit his medication list once he has picked up his medications.     He verbalized understanding.     AL

## 2018-12-26 NOTE — TELEPHONE ENCOUNTER
12/26/18  10:31 AM  Carlito Mo  1955    Home Phone [phone]   Mobile [phone]       Carlito Mo is a patient of Dr Murguia and Massiel, who are both out.  He is calling in today stating he gained 11 lbs in about 30 hours.  He did over do it on michelle. He is complaining of increase SOA, no edema, feeling weak and wooseie, feels heaviness in his chest.  bp today 159/93 hr 78 wt 174    Cardiac meds reviewed with patient.  Please help clarify meds as patient taking meds not on epic list!    Furosemide 40mg daily (taking)  Metoprolol succinate 100mg dialy (he is not taking, doesn't have prescription on this med)  Amlodipine 5mg daily (taking)  Clopidogrel 75 mg (taking)  Losartan potassium 100mg dialy (taking, not on epic list.  Last noted when pt saw Dr Murguia on 7/31/18, then it dropped off, but I cant find where is was changed)    Does he need a med change?  Should he be taking the metoprolol and Losartan?  Does he need to be seen?  Alisia Hernandez RN  Triage nurse

## 2018-12-28 ENCOUNTER — TELEPHONE (OUTPATIENT)
Dept: CARDIOLOGY | Facility: CLINIC | Age: 63
End: 2018-12-28

## 2018-12-28 ENCOUNTER — HOSPITAL ENCOUNTER (OUTPATIENT)
Dept: SLEEP MEDICINE | Facility: HOSPITAL | Age: 63
Discharge: HOME | End: 2018-12-28
Attending: INTERNAL MEDICINE | Admitting: INTERNAL MEDICINE

## 2018-12-28 DIAGNOSIS — I10 ESSENTIAL HYPERTENSION: ICD-10-CM

## 2018-12-28 DIAGNOSIS — I50.43 ACUTE ON CHRONIC COMBINED SYSTOLIC AND DIASTOLIC CONGESTIVE HEART FAILURE (HCC): ICD-10-CM

## 2018-12-28 DIAGNOSIS — R06.83 SNORING: ICD-10-CM

## 2018-12-28 DIAGNOSIS — R29.818 SUSPECTED SLEEP APNEA: ICD-10-CM

## 2018-12-28 DIAGNOSIS — I63.10 CEREBROVASCULAR ACCIDENT (CVA) DUE TO EMBOLISM OF PRECEREBRAL ARTERY (HCC): ICD-10-CM

## 2018-12-28 DIAGNOSIS — G47.33 OSA (OBSTRUCTIVE SLEEP APNEA): ICD-10-CM

## 2018-12-28 PROCEDURE — 95810 POLYSOM 6/> YRS 4/> PARAM: CPT

## 2018-12-28 NOTE — TELEPHONE ENCOUNTER
Called patient to follow up on shortness of breath, weights and to verify medications. I left voicemail for him to return my call.      AL

## 2019-01-11 ENCOUNTER — TRANSCRIBE ORDERS (OUTPATIENT)
Dept: ADMINISTRATIVE | Facility: HOSPITAL | Age: 64
End: 2019-01-11

## 2019-01-11 DIAGNOSIS — R59.1 LAD (LYMPHADENOPATHY): Primary | ICD-10-CM

## 2019-01-16 ENCOUNTER — OFFICE VISIT (OUTPATIENT)
Dept: NEUROLOGY | Facility: CLINIC | Age: 64
End: 2019-01-16

## 2019-01-16 VITALS
DIASTOLIC BLOOD PRESSURE: 60 MMHG | BODY MASS INDEX: 23.46 KG/M2 | HEIGHT: 73 IN | WEIGHT: 177 LBS | OXYGEN SATURATION: 93 % | SYSTOLIC BLOOD PRESSURE: 130 MMHG | HEART RATE: 74 BPM

## 2019-01-16 DIAGNOSIS — E78.49 OTHER HYPERLIPIDEMIA: ICD-10-CM

## 2019-01-16 DIAGNOSIS — Z79.4 TYPE 2 DIABETES MELLITUS WITH MODERATE NONPROLIFERATIVE RETINOPATHY WITHOUT MACULAR EDEMA, WITH LONG-TERM CURRENT USE OF INSULIN, UNSPECIFIED LATERALITY (HCC): ICD-10-CM

## 2019-01-16 DIAGNOSIS — I10 ESSENTIAL HYPERTENSION: ICD-10-CM

## 2019-01-16 DIAGNOSIS — I63.10 CEREBROVASCULAR ACCIDENT (CVA) DUE TO EMBOLISM OF PRECEREBRAL ARTERY (HCC): Primary | ICD-10-CM

## 2019-01-16 DIAGNOSIS — E11.3399 TYPE 2 DIABETES MELLITUS WITH MODERATE NONPROLIFERATIVE RETINOPATHY WITHOUT MACULAR EDEMA, WITH LONG-TERM CURRENT USE OF INSULIN, UNSPECIFIED LATERALITY (HCC): ICD-10-CM

## 2019-01-16 PROCEDURE — 99215 OFFICE O/P EST HI 40 MIN: CPT | Performed by: NURSE PRACTITIONER

## 2019-01-16 RX ORDER — FUROSEMIDE 20 MG/1
20 TABLET ORAL 2 TIMES DAILY
COMMUNITY
End: 2019-02-06

## 2019-01-16 RX ORDER — LOSARTAN POTASSIUM 100 MG/1
100 TABLET ORAL DAILY
Status: ON HOLD | COMMUNITY
End: 2019-04-25 | Stop reason: SDUPTHER

## 2019-01-16 RX ORDER — TAMSULOSIN HYDROCHLORIDE 0.4 MG/1
1 CAPSULE ORAL DAILY
COMMUNITY

## 2019-01-16 RX ORDER — FEXOFENADINE HCL 180 MG/1
180 TABLET ORAL DAILY
COMMUNITY
End: 2019-02-06

## 2019-01-16 RX ORDER — LEVOTHYROXINE SODIUM 0.07 MG/1
75 TABLET ORAL DAILY
Status: ON HOLD | COMMUNITY
End: 2021-05-21

## 2019-01-16 NOTE — PROGRESS NOTES
"DOS: 2019  NAME: Carlito Mo   : 1955  PCP: Jyoti Reynolds MD    Chief Complaint   Patient presents with   • Cerebrovascular Accident      SUBJECTIVE  Neurological Problem:  63 y.o. LHW  male CAD (CABG x 4, ), DM, CKD, HTN, HLD,CHF, YAW and strokes who presents today for stroke f/u. He is unaccompanied.     Interval History:   -Dec 2017: Mr. Mo presented on 17 with two weeks of personality/behavioral changes that was thought to be encephalopathy. He was prophylactically placed on acyclovir and underwent an LP that was unremarkable. He had been on  mg since MI in , followed by Dr. Murguia. His MRI  Showed bilateral strokes that were likely cardioembolic. He had a NEISHA that showed akinetic segments, moderately dilated LA and small PFO. Prolonged cardiac monitoring showed no evidence of afib. D/C summary notes patient was discharged on  mg and Lipitor 80 mg.     -2018: Patient presented again with AMS and alteration in speech in the setting of hypertensive urgency and hyperglycemia secondary to medication noncompliance. He was found to have new infarcts in multiple arterial distributions. Per Dr Reddy's notes, anticoagulation was considered; however with no definitive evidence for source of embolism and patient's noncompliance patient was discharged on DAPT, atatin and urged better medication compliance and aggressive control of risk factors.     -May 2018: Patient again presented with AMS and reports of medication noncompliance and was found to have small acute bilateral cerebral hemisphere infarcts. Per review of records, cardiology recommended empiric treatment with Eliquis but deferred to neurology. Per D/C summary patient was discharged on ASA 81 mg, Plavix 75 mg and statin.      -Dec 2018: Patient presented with two weeks of feeling \"in a fog\" and was found to have a large right pleural effusion s/p thoracentesis with acute on chronic CHF. He was evaluated by Dr." "Marie for AMS. MRI showed DWI abnormalities, T2 shine through vs. Small cortical acute infarcts. No changes were made to patient's regimen.     He presents today and continues on ASA, Plavix and Lipitor 40 mg and is tolerating well. He reports missing medications once or twice a week he does not take his medication due to having an autisitc son and not having enough time to take care of himself. Hisautistic son called several times during exam, which clearly was irritating patient. He denies headaches but he feels that he has residual symptoms of \"mentally missing something\" with poor memory, slurring speech and \"just slow\". He denies any vision changes, weakness or any new stroke/TIA symptoms.  He had a sleep study last year and was tole he did not have YAW. He denies smoking or alcohol use. He denies any other drug use. 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    Review of Systems:Review of Systems   Constitutional: Positive for fatigue. Negative for activity change, appetite change, chills, diaphoresis, fever and unexpected weight change.   HENT: Negative.    Eyes: Negative.    Respiratory: Negative.    Cardiovascular: Negative.    Gastrointestinal: Negative.    Endocrine: Negative.    Genitourinary: Negative.    Musculoskeletal: Negative.    Skin: Negative.    Allergic/Immunologic: Negative.    Neurological: Positive for dizziness, tremors, speech difficulty and weakness. Negative for seizures, syncope, facial asymmetry, light-headedness, numbness and headaches.   Hematological: Negative.    Psychiatric/Behavioral: Positive for sleep disturbance. Negative for agitation, behavioral problems, confusion, decreased concentration, dysphoric mood, hallucinations, self-injury and suicidal ideas. The patient is not nervous/anxious and is not hyperactive.      Current Medications:   Current Outpatient Medications:   •  amLODIPine (NORVASC) 5 MG tablet, Take 1 tablet by " "mouth Daily for 180 days., Disp: 90 tablet, Rfl: 1  •  aspirin 81 MG EC tablet, Take 1 tablet by mouth Daily for 180 days., Disp: 90 tablet, Rfl: 1  •  atorvastatin (LIPITOR) 40 MG tablet, Take 1 tablet by mouth Every Night for 180 days., Disp: 90 tablet, Rfl: 1  •  buPROPion XL (WELLBUTRIN XL) 150 MG 24 hr tablet, Take 1 tablet by mouth Daily for 180 days., Disp: 90 tablet, Rfl: 1  •  clopidogrel (PLAVIX) 75 MG tablet, Take 1 tablet by mouth Daily for 180 days., Disp: 90 tablet, Rfl: 1  •  docusate sodium (COLACE) 100 MG capsule, Take 1 capsule by mouth 2 (two) times a day. (Patient taking differently: Take 100 mg by mouth As Needed for Constipation.), Disp: 30 capsule, Rfl: 0  •  fexofenadine (ALLEGRA) 180 MG tablet, Take 180 mg by mouth Daily., Disp: , Rfl:   •  furosemide (LASIX) 20 MG tablet, Take 20 mg by mouth 2 (Two) Times a Day., Disp: , Rfl:   •  insulin glulisine (APIDRA) 100 UNIT/ML injection, Inject  under the skin into the appropriate area as directed 3 (Three) Times a Day Before Meals. Sliding scale  Pt states \"I rarely take it, maybe once a week. I just don't like sticking my self.\", Disp: , Rfl:   •  Insulin Pen Needle (B-D ULTRAFINE III SHORT PEN) 31G X 8 MM misc, Use to inject 4x daily, Disp: 150 each, Rfl: 5  •  levothyroxine (SYNTHROID, LEVOTHROID) 75 MCG tablet, Take 75 mcg by mouth Daily., Disp: , Rfl:   •  losartan (COZAAR) 100 MG tablet, Take 100 mg by mouth Daily., Disp: , Rfl:   •  RELION GLUCOSE TEST STRIPS test strip, 1 each by Other route 2 (two) times a day. Use as instructed, Disp: , Rfl:   •  tamsulosin (FLOMAX) 0.4 MG capsule 24 hr capsule, Take 1 capsule by mouth Every Night., Disp: , Rfl:   •  vitamin D (ERGOCALCIFEROL) 12343 units capsule capsule, Take 1 cap 3 times weekly, Disp: 24 capsule, Rfl: 1    The following portions of the patient's history were reviewed and updated as appropriate: allergies, current medications, past family history, past medical history, past social " history, past surgical history and problem list.    OBJECTIVE  Vitals:    01/16/19 1559   BP: 130/60   Pulse: 74   SpO2: 93%       Diagnostics:  MRI brain 12/2/17: Moderate diffuse atrophy and prominent chronic small vessel  ischemic change. There are multiple small acute infarcts in both  cerebral hemispheres as described above and raising the concern of  cardiac etiology and further clinical correlation is recommended  MRI brain 2/17/18: Several small foci of acute infarct may represent embolic phenomenon.  Moderate to prominent periventricular white matter disease suggesting  chronic small vessel ischemic change.  MRI brain 12/2/18: IMPRESSION:  Extensive small vessel ischemic disease and multiple lacunar  infarcts. There is a small acute infarct involving the subcortical white  matter of the right frontal lobe superiolaterally. There is increased  signal intensity on the diffusion weighted sequence without convincing  signal loss on the ADC map involving of the superior aspect of the left  frontal lobe posteriorly as well as. This may represent a T2 shine  through effect or potentially a subacute infarct. It corresponds to an  area of T2 FLAIR and T2 hyperintensity which is new versus 05/01/2018.  The presence of potential infarction multiple vascular distributions  raise the possibility of a proximal or cardiac source.    CTA head/neck 11/30/17:   CTP, CTA h/n 2/16/18: IMPRESSION:  1. CT perfusion study is nondiagnostic  2. There is moderate small vessel disease in cerebral white matter, tiny  3 mm old lacunar infarct posterior limb left internal capsule and 8 x 3  mm old lacunar infarct extending from the lateral right thalamus into  the posterior limb of the right internal capsule, tiny old lacunar  infarcts in posterior body of the left caudate nucleus, the left corona  radiata region and tiny old lacunar infarcts of the superior left  frontal parietal subcortical white matter. No convincing acute  completed  infarct and no intracranial hemorrhage is seen on this exam  3. There is moderate-to-severe stenosis at the left vertebral origin and  mild/moderate stenosis of the proximal cervical segment of the left  vertebral artery at C6-7 cervical level. Otherwise, the more dominant  left vertebral artery is widely patent to the vertebrobasilar junction  without additional stenosis. There is a moderate stenosis at the right  vertebral origin and the right vertebral artery is very diseased and  stenotic with moderate stenosis at the C6-7 level that appears slightly  more stenotic at this level than it was on a CT angiogram of the head  and neck 11/30/2017 which may be due to progressive atherosclerotic  disease, conceivably it could be due to a chronic dissection but favor  it is atherosclerotic in origin. The right vertebral artery is patent  beyond this point to the vertebrobasilar junction. The basilar artery is  patent but small in diameter and there is an atretic P1 segment right  posterior cerebral artery and markedly hypoplastic P1 segment left  posterior cerebral artery with persistent fetal origin of the right  posterior cerebral artery off the back wall of the supracavernous  segment of the right internal carotid artery and near persistent fetal  origin of the posterior cerebral artery off the back wall of the  supracavernous left internal carotid artery and thus the vast majority  of the blood flow to the posterior cerebral artery territories is from  the internal carotid arteries and not the basilar artery and this likely  accounts for the small diameter basilar artery.   4. There is mild 10 to 20% stenosis of the origin of the proximal right  internal carotid arteries on the NASCET criteria, otherwise no carotid  stenosis seen on this exam. The remainder of the CT angiogram of the  head and neck is unremarkable. If there remains clinical suspicion of  acute stroke I recommend a MRI of the brain for more  complete assessment  and at the time of MRI of the brain an MRA of the neck could be obtained  with thin cut fat-suppressed T1 and T2-weighted images through the  vertebral arteries to further characterize the areas of stenosis to the  proximal cervical vertebral arteries. The results and recommendations  from this study were communicated to Dr. Reddy by telephone on  02/16/2018 at 9 PM.    NEISHA 12/4/17: Interpretation Summary   · Left ventricular systolic function is mildly decreased. Calculated EF = 45%. Normal left ventricular cavity size and wall thickness noted.  · The following segments are akinetic: mid anteroseptal, mid inferoseptal, apical anterior, apical septal and apex.  · Left atrial cavity size is moderately dilated.  · Doppler interrogation shows mildly reduced flow within the left atrial appendage.  · Small patent foramen ovale present.  · The mitral valve is abnormal in structure. There is moderate bileaflet thickening present.  · Mild mitral valve regurgitation is present.       Laboratory Results:         Lab Results   Component Value Date    WBC 7.38 12/03/2018    HGB 10.4 (L) 12/03/2018    HCT 33.9 (L) 12/03/2018    MCV 81.7 12/03/2018     12/03/2018     Lab Results   Component Value Date    GLUCOSE 123 (H) 12/03/2018    BUN 27 (H) 12/03/2018    CREATININE 1.63 (H) 12/03/2018    EGFRIFNONA 43 (L) 12/03/2018    EGFRIFAFRI 74 12/12/2017    BCR 16.6 12/03/2018    K 4.1 12/03/2018    CO2 27.0 12/03/2018    CALCIUM 8.7 12/03/2018    PROTENTOTREF 6.7 12/12/2017    ALBUMIN 3.30 (L) 11/30/2018    LABIL2 1.3 12/12/2017    AST 16 11/30/2018    ALT 19 11/30/2018     Lab Results   Component Value Date    HGBA1C 8.80 (H) 12/03/2018     Lab Results   Component Value Date    CHOL 124 12/03/2018    CHOL 275 (H) 05/02/2018    CHOL 257 (H) 02/18/2018     Lab Results   Component Value Date    HDL 49 12/03/2018    HDL 50 05/02/2018    HDL 50 02/18/2018     Lab Results   Component Value Date    LDL 61  12/03/2018     (H) 05/02/2018     (H) 02/18/2018     Lab Results   Component Value Date    TRIG 72 12/03/2018    TRIG 81 05/02/2018    TRIG 84 02/18/2018     No results found for: RPR  Lab Results   Component Value Date    TSH 4.930 (H) 12/02/2018     Lab Results   Component Value Date    [id number] 388 12/02/2017       Physical Examination:   General Appearance:   Well developed, well nourished, disheveled appearance, alert, and cooperative.  HEENT: Normocephalic.  Smaller palpebral fissure on the right  Neck and Spine: Normal range of motion.  Normal alignment. No mass or tenderness. No bruits.  Cardiac: Regular rate and rhythm. No murmurs.  Peripheral Vasculature: Radial pulses are equal and symmetric. No signs of distal embolization.  Extremities:    No edema or deformities. Normal joint ROM.  Skin:    No rashes or birth marks.    Neurological examination:  Higher Integrative  Function: Oriented to time, place and person. Normal registration, recall (3/3) attention span and concentration. Normal language including comprehension, spontaneous speech, repetition, reading, writing, naming and vocabulary. No neglect with normal visual-spatial function and construction. Normal fund of knowledge and higher integrative function.  CN II: Pupils are equal, round, and reactive to light. Normal visual acuity and visual fields.    CN III IV VI: Extraocular movements are full without nystagmus.   CN V: Normal facial sensation and strength of muscles of mastication.  CN VII: Facial movements are symmetric. No weakness.  CN VIII:   Auditory acuity is normal.  CN IX & X:   Symmetric palatal movement.  CN XI: Sternocleidomastoid and trapezius are normal.  No weakness.  CN XII:   The tongue is midline.  No atrophy or fasciculations.  Motor: Normal muscle strength, bulk and tone in upper and lower extremities.  No fasciculations, rigidity, spasticity, or abnormal movements.  Sensation: Normal to light touch,  vibration, temperature, and proprioception in arms and legs. Normal graphesthesia and no extinction on DSS.  Station and Gait: Normal gait and station.    Coordination: Finger to nose test shows no dysmetria.  Heel to shin normal.    Impression:  Mr. Mo is dong fair following his multiple admissions in the past year for recurrent infarcts in multiple vascular territories in the setting of uncontrolled risk factors and poor compliance with medication regimen. His neurologic exam today is essentially normal. The etiology of his events could certainly be due to atherosclerotic disease secondary to his uncontrolled risk risk factors and poor compliance, however cardioembolic source cannot be totally excluded as patient has moderately dilated LA, PFO and multiple akinetic segments. I d/w Dr. Trujillo and given patients repeated failures on ASA and Plavix, would recommend empiric treatment with anticoagulation, along with f/u MRI brain in 6 months. Patient has scheduled f/u with cardiology this week. I discussed with patient at length the risk factors for stroke and the importance of medication compliance and aggressive risk factor control.    Plan:     Recommend empiric Tx with anticoagulation   Strongly encouraged medication compliance  Continue statin therapy for goal LDL < 70  Monitor BP regularly and record  Secondary stroke prevention: Ideal targets for stroke prevention would be Blood pressure < 130/80; B12 > 500 TSH in normal range and LDL < 70; HbA1c < 6.5 and smoking cessation if applicable.    Call 911 for stroke symptoms  Follow-up in 6 months with repeat MRI    I spent 40 minutes face to face with patient, with  > 50% spent counseling patient regarding diagnosis, review of diagnostics, personal risk factors for stroke and importance of risk factor control for stroke prevention.     Carlito was seen today for cerebrovascular accident.    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

## 2019-01-17 ENCOUNTER — TELEPHONE (OUTPATIENT)
Dept: NEUROLOGY | Facility: CLINIC | Age: 64
End: 2019-01-17

## 2019-01-17 DIAGNOSIS — I63.89 CEREBROVASCULAR ACCIDENT (CVA) DUE TO OTHER MECHANISM (HCC): Primary | ICD-10-CM

## 2019-01-17 NOTE — TELEPHONE ENCOUNTER
Notified patient that Alisia Paula had reviewed his case with Dr. Trujillo and because patient has had recurrent strokes on ASA and Plavix, that the patient should be empirically be placed on a blood thinner.  Notified the patient that he can d/w cardiology tomorrow and if they agree, would recommend anticoagulation.   Notified that he will need to have repeat MRI in 6 months, and order will be placed

## 2019-01-17 NOTE — TELEPHONE ENCOUNTER
----- Message from JI Acosta sent at 1/17/2019  1:38 PM EST -----  Can you please notify patient that I reviewed his case with Dr. Trujillo and because he has had recurrent strokes on ASA and Plavix, that the patient should be empirically be placed on a blood thinner. He can d/w cardiology tomorrow and if they agree, would recommend anticoagulation. He would also like the patient to have a repeat MRI in 6 months.

## 2019-01-18 ENCOUNTER — OFFICE VISIT (OUTPATIENT)
Dept: CARDIOLOGY | Facility: CLINIC | Age: 64
End: 2019-01-18

## 2019-01-18 ENCOUNTER — TELEPHONE (OUTPATIENT)
Dept: PHARMACY | Facility: HOSPITAL | Age: 64
End: 2019-01-18

## 2019-01-18 VITALS
DIASTOLIC BLOOD PRESSURE: 90 MMHG | SYSTOLIC BLOOD PRESSURE: 160 MMHG | WEIGHT: 179.8 LBS | RESPIRATION RATE: 16 BRPM | HEART RATE: 75 BPM | HEIGHT: 73 IN | BODY MASS INDEX: 23.83 KG/M2

## 2019-01-18 DIAGNOSIS — I50.43 ACUTE ON CHRONIC COMBINED SYSTOLIC AND DIASTOLIC CONGESTIVE HEART FAILURE (HCC): Primary | ICD-10-CM

## 2019-01-18 DIAGNOSIS — I63.10 CEREBROVASCULAR ACCIDENT (CVA) DUE TO EMBOLISM OF PRECEREBRAL ARTERY (HCC): ICD-10-CM

## 2019-01-18 DIAGNOSIS — E11.3399 TYPE 2 DIABETES MELLITUS WITH MODERATE NONPROLIFERATIVE RETINOPATHY WITHOUT MACULAR EDEMA, WITH LONG-TERM CURRENT USE OF INSULIN, UNSPECIFIED LATERALITY (HCC): ICD-10-CM

## 2019-01-18 DIAGNOSIS — G47.33 OSA (OBSTRUCTIVE SLEEP APNEA): ICD-10-CM

## 2019-01-18 DIAGNOSIS — I10 ESSENTIAL HYPERTENSION: ICD-10-CM

## 2019-01-18 DIAGNOSIS — N18.30 CKD (CHRONIC KIDNEY DISEASE) STAGE 3, GFR 30-59 ML/MIN (HCC): ICD-10-CM

## 2019-01-18 DIAGNOSIS — Z79.4 TYPE 2 DIABETES MELLITUS WITH MODERATE NONPROLIFERATIVE RETINOPATHY WITHOUT MACULAR EDEMA, WITH LONG-TERM CURRENT USE OF INSULIN, UNSPECIFIED LATERALITY (HCC): ICD-10-CM

## 2019-01-18 DIAGNOSIS — E78.49 OTHER HYPERLIPIDEMIA: ICD-10-CM

## 2019-01-18 DIAGNOSIS — I25.9 CHRONIC ISCHEMIC HEART DISEASE: ICD-10-CM

## 2019-01-18 DIAGNOSIS — J96.01 ACUTE RESPIRATORY FAILURE WITH HYPOXIA (HCC): ICD-10-CM

## 2019-01-18 PROCEDURE — 99214 OFFICE O/P EST MOD 30 MIN: CPT | Performed by: INTERNAL MEDICINE

## 2019-01-18 PROCEDURE — 93000 ELECTROCARDIOGRAM COMPLETE: CPT | Performed by: INTERNAL MEDICINE

## 2019-01-18 RX ORDER — WARFARIN SODIUM 2 MG/1
2 TABLET ORAL DAILY
Qty: 60 TABLET | Refills: 2 | Status: SHIPPED | OUTPATIENT
Start: 2019-01-18 | End: 2019-01-28 | Stop reason: SDUPTHER

## 2019-01-18 NOTE — PROGRESS NOTES
PATIENTINFORMATION    Date of Office Visit: 2019  Encounter Provider: Amber Murguia MD  Place of Service: [facility] CARDIOLOGY  Patient Name: Carlito Mo  : 1955    Subjective:     Encounter Date:2019      Patient ID: Carlito Mo is a 63 y.o. male.      History of Present Illness    Mr. Carlito Mo is a 62 year old male patient with a history of CAD/MI (CABG ), HTN, hyperlipidemia, type 2 diabetes mellitus, anemia, chronic kidney disease, and basal cell carcinoma.  I first saw him in 2017 when he was admitted with altered mental status.  He was diagnosed with multiple small acute infarcts.  His echocardiogram from 2017 showed mild left ventricular hypertrophy, mild left atrial enlargement, negative bubble study, normal LV systolic function with an ejection fraction of 68%.  Transesophageal echo in 2017 showed that his LV function was mildly low with an ejection fraction of 45% with focal wall motion abnormalities primarily in the septum and anterior wall and apex.  Small patent foramen ovale was noted there was no significant valvular heart disease or cardiac source of embolus minus the small PFO.  Holter monitor showed a couple of short bursts of SVT but no sustained arrhythmia.      He came back to the hospital in 2017 again with altered mental status.  At this time he had an echocardiogram which shows ejection fraction was 63%, grade 1 diastolic dysfunction and no significant valvular heart disease.  He wore a Zio patch which showed PACs, PVCs and nonsustained SVT.     He was back in the hospital in May 2018 with acute mental status changes and a blood pressure of 212/109.  He was noted at that time to have a chronically elevated troponin without acute EKG changes.     Sadly he was hospitalized again in 2018 after he was hypoxic and persistently lethargic after a colonoscopy.  During that admission he did have an  echocardiogram which revealed his LV function had decreased and his ejection fraction was now 46%.  There was grade II diastolic dysfunction and mildly elevated pulmonary pressures with a left pleural effusion.  He was treated for pneumonia.  He was also diuresed and seemed to be back to his baseline although he was discharged with 2 liters of oxygen at night.       He comes in today for followup. He denies any chest pain. He says he feels some shortness of breath when he goes down stairs. He does not notice it so much when he goes up stairs. He says he is afraid he is going to fall when he goes down stairs. He has had increased emotional stress because of his son whom he cares for who has Asperger's syndrome. He has noted some mild lower extremity edema. He has not had orthopnea. He did have 1 episode of paroxysmal nocturnal dyspnea a week or so ago which resolved when he got up to go to the bathroom.      Review of Systems   Constitution: Positive for malaise/fatigue. Negative for fever, weight gain and weight loss.   HENT: Positive for sore throat. Negative for ear pain, hearing loss and nosebleeds.    Eyes: Positive for blurred vision. Negative for double vision, pain, vision loss in left eye and vision loss in right eye.   Cardiovascular: Positive for dyspnea on exertion.        See history of present illness.   Respiratory: Positive for shortness of breath and snoring. Negative for cough, sleep disturbances due to breathing and wheezing.    Endocrine: Negative for cold intolerance, heat intolerance and polyuria.   Skin: Negative for itching, poor wound healing and rash.   Musculoskeletal: Negative for joint pain, joint swelling and myalgias.   Gastrointestinal: Negative for abdominal pain, diarrhea, hematochezia, nausea and vomiting.   Genitourinary: Positive for frequency. Negative for hematuria and hesitancy.   Neurological: Positive for dizziness and light-headedness. Negative for numbness, paresthesias and  "seizures.   Psychiatric/Behavioral: Positive for depression. The patient is nervous/anxious.            ECG 12 Lead  Date/Time: 1/18/2019 11:50 AM  Performed by: Amber Murguia MD  Authorized by: Amber Murguia MD   Comparison: compared with previous ECG from 12/12/2018  Similar to previous ECG  Rhythm: sinus rhythm  BPM: 75  Conduction: right bundle branch block  Clinical impression: abnormal ECG               Objective:     /90 (BP Location: Left arm, Patient Position: Sitting, Cuff Size: Adult)   Pulse 75   Resp 16   Ht 185.4 cm (73\")   Wt 81.6 kg (179 lb 12.8 oz)   BMI 23.72 kg/m²  Body mass index is 23.72 kg/m².     Physical Exam   Constitutional: He appears well-developed.   HENT:   Head: Normocephalic and atraumatic.   Eyes: Conjunctivae and lids are normal. Pupils are equal, round, and reactive to light. Lids are everted and swept, no foreign bodies found.   Neck: Normal range of motion. No JVD present. Carotid bruit is not present. No tracheal deviation present. No thyroid mass present.   Cardiovascular: Normal rate, regular rhythm and normal heart sounds.   Pulses:       Dorsalis pedis pulses are 2+ on the right side, and 2+ on the left side.   Pulmonary/Chest: Effort normal and breath sounds normal.   Abdominal: Normal appearance and bowel sounds are normal.   Musculoskeletal: Normal range of motion.   Neurological: He is alert. He has normal strength.   Skin: Skin is warm, dry and intact.   Psychiatric: He has a normal mood and affect. His behavior is normal.   Vitals reviewed.            Assessment/Plan:       1.  Acute on chronic systolic and diastolic heart failure.  On oral Lasix.  BUN and creatinine stable.  He appears euvolemic.  Heart Failure  Subjective/Objective  • The patient reports dyspnea    • Physical exam findings negative for elevated JVP.       2.  History of stroke.  Transesophageal echocardiogram did not show cardiac source of embolus.  Zio patch did not show atrial " fibrillation.  I reviewed the notes from his neurologist which stated he has had further strokes on aspirin and Plavix and they recommended anticoagulation.  Given chronic kidney disease, I recommend warfarin.  I referred him to the anticoagulation clinic for INR monitoring.    3.  History of coronary artery disease with remote history of bypass surgery in 2001.  Coronary Artery Disease  Assessment  • The patient has no angina    Plan  • Lifestyle modifications discussed include adhering to a heart healthy diet, regular exercise and regular monitoring of cholesterol and blood pressure    Subjective - Objective  • Current antiplatelet therapy includes aspirin 81 mg      3.  Hypertension.  Controlled.   4.  Hyperlipidemia.  He is supposed to be on atorvastatin but has questionable compliance.  5.  Diabetes  6.  Chronic kidney disease  7.  Fatigue and lack of energy.  8.  Right bundle branch block  9.  Chronically elevated troponin.    Follow up with Massiel in 3 months to make sure he is tolerating anticoagulation.     Orders Placed This Encounter   Procedures   • Ambulatory Referral to Anticoagulation Monitoring     Referral Priority:   Routine     Referral Type:   Monitoring     Referral Reason:   Specialty Services Required     Requested Specialty:   Cardiology     Number of Visits Requested:   1   • ECG 12 Lead     This order was created via procedure documentation        Discharge Medications           Accurate as of 1/18/19 12:34 PM. If you have any questions, ask your nurse or doctor.               New Medications      Instructions Start Date   warfarin 2 MG tablet  Commonly known as:  COUMADIN  Started by:  Amber Murguia MD   2 mg, Oral, Daily         Changes to Medications      Instructions Start Date   docusate sodium 100 MG capsule  Commonly known as:  COLACE  What changed:    · when to take this  · reasons to take this   100 mg, Oral, 2 Times Daily         Continue These Medications      Instructions  "Start Date   amLODIPine 5 MG tablet  Commonly known as:  NORVASC   5 mg, Oral, Daily      APIDRA 100 UNIT/ML injection  Generic drug:  insulin glulisine   Subcutaneous, 3 Times Daily Before Meals, Sliding scale  Pt states \"I rarely take it, maybe once a week. I just don't like sticking my self.\"      aspirin 81 MG EC tablet   81 mg, Oral, Daily      atorvastatin 40 MG tablet  Commonly known as:  LIPITOR   40 mg, Oral, Nightly      buPROPion  MG 24 hr tablet  Commonly known as:  WELLBUTRIN XL   150 mg, Oral, Daily      fexofenadine 180 MG tablet  Commonly known as:  ALLEGRA   180 mg, Oral, Daily      furosemide 20 MG tablet  Commonly known as:  LASIX   20 mg, Oral, 2 Times Daily      Insulin Pen Needle 31G X 8 MM misc  Commonly known as:  B-D ULTRAFINE III SHORT PEN   Use to inject 4x daily      levothyroxine 75 MCG tablet  Commonly known as:  SYNTHROID, LEVOTHROID   75 mcg, Oral, Daily      losartan 100 MG tablet  Commonly known as:  COZAAR   100 mg, Oral, Daily      RELION GLUCOSE TEST STRIPS test strip  Generic drug:  glucose blood   1 each, Other, 2 Times Daily, Use as instructed       tamsulosin 0.4 MG capsule 24 hr capsule  Commonly known as:  FLOMAX   1 capsule, Oral, Nightly      vitamin D 56207 units capsule capsule  Commonly known as:  ERGOCALCIFEROL   Take 1 cap 3 times weekly         Stop These Medications    clopidogrel 75 MG tablet  Commonly known as:  PLAVIX  Stopped by:  MD Amber Padilla MD  01/18/19  12:34 PM    "

## 2019-01-21 ENCOUNTER — ANTICOAGULATION VISIT (OUTPATIENT)
Dept: PHARMACY | Facility: HOSPITAL | Age: 64
End: 2019-01-21

## 2019-01-21 DIAGNOSIS — I63.10 CEREBROVASCULAR ACCIDENT (CVA) DUE TO EMBOLISM OF PRECEREBRAL ARTERY (HCC): ICD-10-CM

## 2019-01-21 LAB
INR PPP: 1 (ref 0.91–1.09)
PROTHROMBIN TIME: 12.3 SECONDS (ref 10–13.8)

## 2019-01-21 PROCEDURE — 36416 COLLJ CAPILLARY BLOOD SPEC: CPT

## 2019-01-21 PROCEDURE — G0463 HOSPITAL OUTPT CLINIC VISIT: HCPCS

## 2019-01-21 PROCEDURE — 85610 PROTHROMBIN TIME: CPT

## 2019-01-21 NOTE — PROGRESS NOTES
Anticoagulation Clinic Progress Note  Anticoagulation Summary  As of 2019    INR goal:   2.0-3.0   TTR:   --   INR used for dosing:      Warfarin maintenance plan:   4 mg every day   Weekly warfarin total:   28 mg   Plan last modified:   Aron Ulloa RPH (2019)   Next INR check:   2019   Target end date:       Indications    Cerebrovascular accident (CVA) due to embolism of precerebral artery (CMS/HCC) [I63.10]             Anticoagulation Episode Summary     INR check location:       Preferred lab:       Send INR reminders to:    [facility] [place]    Comments:         Anticoagulation Care Providers     Provider Role Specialty Phone number    Amber Murguia MD Referring Cardiology [phone]          Clinic Interview:  Patient Findings     Negatives:   Signs/symptoms of thrombosis, Signs/symptoms of bleeding,   Laboratory test error suspected, Change in health, Change in alcohol use,   Change in activity, Upcoming invasive procedure, Emergency department   visit, Upcoming dental procedure, Missed doses, Extra doses, Change in   medications, Change in diet/appetite, Hospital admission, Bruising, Other   complaints      Clinical Outcomes     Negatives:   Major bleeding event, Thromboembolic event,   Anticoagulation-related hospital admission, Anticoagulation-related ED   visit, Anticoagulation-related fatality        Education:  Carlito Mo is a new start in the [facility]. We discussed the followin) Warfarin's indication, mechanism, and dosing  2) Enforced the importance of taking warfarin as instructed and at the same time every day, preferably in the evening so that we can make dose adjustments more easily following subsequent clinic visits  3) What he should do about a missed dose; pts can take missed doses within about 12 hours of their usual scheduled dose, but he was instructed on the importance of not doubling up on doses unless told to do so by the  [facility]  4) Explained possible side effects of warfarin therapy, including increased risk of bleeding, s/sx of bleeding and s/sx of any additional clots/PE/CVA.   5) Discussed monitoring of warfarin, the INR, goal INR range, and the frequency of monitoring  6) Reviewed drug/food/tobacco/EtOH interactions and provided written information covering these topics in more detail, explaining that green, leafy vegetables interact most heavily with warfarin  7) Instructed the pt not to take or discontinue any medications without informing his physician/pharmacist and reminded him to inform us of any dietary changes, as well  8) Explained that he would be coming into the clinic more frequently in these first few weeks of therapy as we try to adjust his dose and achieve a therapeutic INR x 2 consecutive readings. Once that is achieved, patient will follow up in clinic every 4 weeks, on average.    He stated no problems with transportation or scheduling clinic appts in this manner. he expressed understanding of the information provided and has no additional questions at this time.    Carlito Mo was presented with a copy of the Patients Rights and Responsibilities. he expressed verbal consent and agreement to receive care in the [facility] under the current collaborative care agreement with [facility].     PT IS A NEW START TO WARFARIN    Plan:  1. INR is Subtherapeutic today- see above in Anticoagulation Summary.   Will instruct Carlito Mo to Change their warfarin regimen- see above in Anticoagulation Summary.  2. Follow up in 3 days  3. Patient declines warfarin refills.  4. Verbal and written information provided. Patient expresses understanding and has no further questions at this time.    Aron Ulloa [facility]

## 2019-01-24 ENCOUNTER — ANTICOAGULATION VISIT (OUTPATIENT)
Dept: PHARMACY | Facility: HOSPITAL | Age: 64
End: 2019-01-24

## 2019-01-24 DIAGNOSIS — I63.10 CEREBROVASCULAR ACCIDENT (CVA) DUE TO EMBOLISM OF PRECEREBRAL ARTERY (HCC): ICD-10-CM

## 2019-01-24 LAB
INR PPP: 1.2 (ref 0.91–1.09)
PROTHROMBIN TIME: 14.4 SECONDS (ref 10–13.8)

## 2019-01-24 PROCEDURE — G0463 HOSPITAL OUTPT CLINIC VISIT: HCPCS

## 2019-01-24 PROCEDURE — 36416 COLLJ CAPILLARY BLOOD SPEC: CPT

## 2019-01-24 PROCEDURE — 85610 PROTHROMBIN TIME: CPT

## 2019-01-24 NOTE — PROGRESS NOTES
Anticoagulation Clinic Progress Note    Anticoagulation Summary  As of 2019    INR goal:   2.0-3.0   TTR:   --   INR used for dosin.2! (2019)   Warfarin maintenance plan:   4 mg every day   Weekly warfarin total:   28 mg   Plan last modified:   Aron Ulloa RPH (2019)   Next INR check:   2019   Priority:   High   Target end date:       Indications    Cerebrovascular accident (CVA) due to embolism of precerebral artery (CMS/HCC) [I63.10]             Anticoagulation Episode Summary     INR check location:       Preferred lab:       Send INR reminders to:    [facility]    Comments:         Anticoagulation Care Providers     Provider Role Specialty Phone number    Amber Murguia MD Referring Cardiology [phone]          Clinic Interview:  Patient Findings     Negatives:   Signs/symptoms of thrombosis, Signs/symptoms of bleeding,   Laboratory test error suspected, Change in health, Change in alcohol use,   Change in activity, Upcoming invasive procedure, Emergency department   visit, Upcoming dental procedure, Missed doses, Extra doses, Change in   medications, Change in diet/appetite, Hospital admission, Bruising, Other   complaints      Clinical Outcomes     Negatives:   Major bleeding event, Thromboembolic event,   Anticoagulation-related hospital admission, Anticoagulation-related ED   visit, Anticoagulation-related fatality        INR History:  Anticoagulation Monitoring 2019   INR 1.0 1.2   INR Date 2019   INR Goal 2.0-3.0 2.0-3.0   Trend - Same   Last Week Total 2 mg 14 mg   Next Week Total 28 mg 38 mg   Sun - 4 mg   Mon 4 mg -   Tue 4 mg -   Wed 4 mg -   Thu - 8 mg ()   Fri - 8 mg ()   Sat - 6 mg ()   Visit Report - -       Plan:  1. INR is Subtherapeutic today- see above in Anticoagulation Summary.  Will instruct Carlito JANINE Mo to Increase their warfarin regimen- see above in Anticoagulation Summary.  2. Follow up in 4  days  3. Patient declines warfarin refills.  4. Verbal and written information provided. Patient expresses understanding and has no further questions at this time.    Aron Ulloa RP

## 2019-01-24 NOTE — PROGRESS NOTES
Anticoagulation Clinic Progress Note    Anticoagulation Summary  As of 2019    INR goal:   2.0-3.0   TTR:   --   INR used for dosin.2! (2019)   Warfarin maintenance plan:   4 mg every day   Weekly warfarin total:   28 mg   Plan last modified:   Aron Ulloa RPH (2019)   Next INR check:   2019   Priority:   High   Target end date:       Indications    Cerebrovascular accident (CVA) due to embolism of precerebral artery (CMS/HCC) [I63.10]             Anticoagulation Episode Summary     INR check location:       Preferred lab:       Send INR reminders to:    [facility]    Comments:         Anticoagulation Care Providers     Provider Role Specialty Phone number    Amber Murguia MD Referring Cardiology [phone]          Clinic Interview:  Patient Findings     Negatives:   Signs/symptoms of thrombosis, Signs/symptoms of bleeding,   Laboratory test error suspected, Change in health, Change in alcohol use,   Change in activity, Upcoming invasive procedure, Emergency department   visit, Upcoming dental procedure, Missed doses, Extra doses, Change in   medications, Change in diet/appetite, Hospital admission, Bruising, Other   complaints      Clinical Outcomes     Negatives:   Major bleeding event, Thromboembolic event,   Anticoagulation-related hospital admission, Anticoagulation-related ED   visit, Anticoagulation-related fatality        INR History:  Anticoagulation Monitoring 2019   INR 1.0 1.2   INR Date 2019   INR Goal 2.0-3.0 2.0-3.0   Trend - Same   Last Week Total 2 mg 14 mg   Next Week Total 28 mg 38 mg   Sun - 4 mg   Mon 4 mg -   Tue 4 mg -   Wed 4 mg -   Thu - 8 mg ()   Fri - 8 mg ()   Sat - 6 mg ()   Visit Report - -       Plan:  1. INR is Subtherapeutic today- see above in Anticoagulation Summary.  New start--will increase doses and recheck early next week.    Will instruct Carlito Mo to Increase their warfarin  regimen- see above in Anticoagulation Summary.  2. Follow up in 4 days  3. Patient declines warfarin refills.  4. Verbal and written information provided. Discussed risks of clotting if INR low and bleeding if INR high. Patient expresses understanding and has no further questions at this time.    Sera La [facility]

## 2019-01-24 NOTE — PROGRESS NOTES
Anticoagulation Clinic Progress Note    Anticoagulation Summary  As of 2019    INR goal:   2.0-3.0   TTR:   --   INR used for dosin.2! (2019)   Warfarin maintenance plan:   4 mg every day   Weekly warfarin total:   28 mg   Plan last modified:   Aron Ulloa RPH (2019)   Next INR check:   2019   Target end date:       Indications    Cerebrovascular accident (CVA) due to embolism of precerebral artery (CMS/HCC) [I63.10]             Anticoagulation Episode Summary     INR check location:       Preferred lab:       Send INR reminders to:   [facility] [place]    Comments:         Anticoagulation Care Providers     Provider Role Specialty Phone number    Amber Murguia MD Referring Cardiology [phone]          Clinic Interview:  Patient Findings     Negatives:   Signs/symptoms of thrombosis, Signs/symptoms of bleeding,   Laboratory test error suspected, Change in health, Change in alcohol use,   Change in activity, Upcoming invasive procedure, Emergency department   visit, Upcoming dental procedure, Missed doses, Extra doses, Change in   medications, Change in diet/appetite, Hospital admission, Bruising, Other   complaints      Clinical Outcomes     Negatives:   Major bleeding event, Thromboembolic event,   Anticoagulation-related hospital admission, Anticoagulation-related ED   visit, Anticoagulation-related fatality        INR History:  Anticoagulation Monitoring 2019   INR 1.0 1.2   INR Date 2019   INR Goal 2.0-3.0 2.0-3.0   Trend - Same   Last Week Total 2 mg 14 mg   Next Week Total 28 mg 38 mg   Sun - 4 mg   Mon 4 mg -   Tue 4 mg -   Wed 4 mg -   Thu - 8 mg ()   Fri - 8 mg ()   Sat - 6 mg ()   Visit Report - -       Plan:  1. INR is therapeutic today- see above in Anticoagulation Summary.   Will instruct Carlito JANINE Mo to continue their warfarin regimen- see above in Anticoagulation Summary.  2. Follow up in 1 weeks.  3. Patient  declines warfarin refills.  4. Verbal and written information provided. Patient expresses understanding and has no further questions at this time.    Kae Alberts

## 2019-01-28 ENCOUNTER — ANTICOAGULATION VISIT (OUTPATIENT)
Dept: PHARMACY | Facility: HOSPITAL | Age: 64
End: 2019-01-28

## 2019-01-28 DIAGNOSIS — I63.10 CEREBROVASCULAR ACCIDENT (CVA) DUE TO EMBOLISM OF PRECEREBRAL ARTERY (HCC): ICD-10-CM

## 2019-01-28 LAB
INR PPP: 1.7 (ref 0.91–1.09)
PROTHROMBIN TIME: 20.5 SECONDS (ref 10–13.8)

## 2019-01-28 PROCEDURE — 36416 COLLJ CAPILLARY BLOOD SPEC: CPT

## 2019-01-28 PROCEDURE — 85610 PROTHROMBIN TIME: CPT

## 2019-01-28 PROCEDURE — G0463 HOSPITAL OUTPT CLINIC VISIT: HCPCS

## 2019-01-28 RX ORDER — WARFARIN SODIUM 2 MG/1
TABLET ORAL
Qty: 90 TABLET | Refills: 0 | Status: SHIPPED | OUTPATIENT
Start: 2019-01-28 | End: 2019-03-04 | Stop reason: SDUPTHER

## 2019-01-28 NOTE — PROGRESS NOTES
Anticoagulation Clinic Progress Note    Anticoagulation Summary  As of 1/28/2019    INR goal:   2.0-3.0   TTR:   --   INR used for dosing:      Warfarin maintenance plan:   6 mg on Mon, Wed, Fri; 4 mg all other days   Weekly warfarin total:   34 mg   Plan last modified:   Aron Ulloa RPH (1/28/2019)   Next INR check:   2/1/2019   Priority:   High   Target end date:       Indications    Cerebrovascular accident (CVA) due to embolism of precerebral artery (CMS/HCC) [I63.10]             Anticoagulation Episode Summary     INR check location:       Preferred lab:       Send INR reminders to:    [facility]    Comments:         Anticoagulation Care Providers     Provider Role Specialty Phone number    Amber Murguia MD Referring Cardiology [phone]          Clinic Interview:  Patient Findings     Negatives:   Signs/symptoms of thrombosis, Signs/symptoms of bleeding,   Laboratory test error suspected, Change in health, Change in alcohol use,   Change in activity, Upcoming invasive procedure, Emergency department   visit, Upcoming dental procedure, Missed doses, Extra doses, Change in   medications, Change in diet/appetite, Hospital admission, Bruising, Other   complaints      Clinical Outcomes     Negatives:   Major bleeding event, Thromboembolic event,   Anticoagulation-related hospital admission, Anticoagulation-related ED   visit, Anticoagulation-related fatality        INR History:  Anticoagulation Monitoring 1/21/2019 1/24/2019 1/28/2019   INR 1.0 1.2 -   INR Date 1/21/2019 1/24/2019 -   INR Goal 2.0-3.0 2.0-3.0 2.0-3.0   Trend - Same Up   Last Week Total 2 mg 14 mg 38 mg   Next Week Total 28 mg 38 mg 34 mg   Sun - 4 mg -   Mon 4 mg - 6 mg   Tue 4 mg - 4 mg   Wed 4 mg - 6 mg   Thu - 8 mg (1/24) 4 mg   Fri - 8 mg (1/25) -   Sat - 6 mg (1/26) -   Visit Report - - -   Some recent data might be hidden       Plan:  1. INR is Subtherapeutic today- see above in Anticoagulation Summary.  Will  instruct Carlito Mo to Increase their warfarin regimen- see above in Anticoagulation Summary.  2. Follow up in 4 days  3. Patient desires warfarin refills.  4. Verbal and written information provided. Patient expresses understanding and has no further questions at this time.    Aron Ulloa [facility]

## 2019-01-31 ENCOUNTER — ANTICOAGULATION VISIT (OUTPATIENT)
Dept: PHARMACY | Facility: HOSPITAL | Age: 64
End: 2019-01-31

## 2019-01-31 DIAGNOSIS — G47.33 OSA (OBSTRUCTIVE SLEEP APNEA): Primary | ICD-10-CM

## 2019-01-31 DIAGNOSIS — I63.10 CEREBROVASCULAR ACCIDENT (CVA) DUE TO EMBOLISM OF PRECEREBRAL ARTERY (HCC): ICD-10-CM

## 2019-01-31 DIAGNOSIS — R06.83 SNORING: ICD-10-CM

## 2019-01-31 LAB
INR PPP: 1.9 (ref 0.91–1.09)
PROTHROMBIN TIME: 22.6 SECONDS (ref 10–13.8)

## 2019-01-31 PROCEDURE — 85610 PROTHROMBIN TIME: CPT

## 2019-01-31 PROCEDURE — 36416 COLLJ CAPILLARY BLOOD SPEC: CPT

## 2019-01-31 RX ORDER — ZOLPIDEM TARTRATE 5 MG/1
5 TABLET ORAL NIGHTLY PRN
Qty: 2 TABLET | Refills: 0 | Status: SHIPPED | OUTPATIENT
Start: 2019-01-31 | End: 2019-04-18

## 2019-01-31 NOTE — PROGRESS NOTES
Anticoagulation Clinic Progress Note    Anticoagulation Summary  As of 2019    INR goal:   2.0-3.0   TTR:   0.0 % (3 d)   INR used for dosin.9! (2019)   Warfarin maintenance plan:   6 mg on Mon, Wed, Fri; 4 mg all other days   Weekly warfarin total:   34 mg   No change documented:   Victorina Perry   Plan last modified:   Aron Ulloa RP (2019)   Next INR check:   2019   Priority:   Critical   Target end date:       Indications    Cerebrovascular accident (CVA) due to embolism of precerebral artery (CMS/HCC) [I63.10]             Anticoagulation Episode Summary     INR check location:       Preferred lab:       Send INR reminders to:    [facility] [place]    Comments:         Anticoagulation Care Providers     Provider Role Specialty Phone number    Amber Murguia MD Referring Cardiology [phone]          Clinic Interview:  Patient Findings     Negatives:   Signs/symptoms of thrombosis, Signs/symptoms of bleeding,   Laboratory test error suspected, Change in health, Change in alcohol use,   Change in activity, Upcoming invasive procedure, Emergency department   visit, Upcoming dental procedure, Missed doses, Extra doses, Change in   medications, Change in diet/appetite, Hospital admission, Bruising, Other   complaints      Clinical Outcomes     Negatives:   Major bleeding event, Thromboembolic event,   Anticoagulation-related hospital admission, Anticoagulation-related ED   visit, Anticoagulation-related fatality        INR History:  Anticoagulation Monitoring 2019   INR 1.2 1.7 1.9   INR Date 2019   INR Goal 2.0-3.0 2.0-3.0 2.0-3.0   Trend Same Up Same   Last Week Total 14 mg 38 mg 42 mg   Next Week Total 38 mg 34 mg 34 mg   Sun 4 mg - 4 mg   Mon - 6 mg 6 mg   Tue - 4 mg -   Wed - 6 mg -   Thu 8 mg () 4 mg 4 mg   Fri 8 mg () - 6 mg   Sat 6 mg () - 4 mg   Visit Report - - -   Some recent data might be  hidden       Plan:  1. INR is therapeutic today- see above in Anticoagulation Summary.   Will instruct Carlito Mo to continue their warfarin regimen- see above in Anticoagulation Summary.  2. Follow up in 1 weeks.  3. Patient declines warfarin refills.  4. Verbal and written information provided. Patient expresses understanding and has no further questions at this time.    Victorina Perry

## 2019-02-05 ENCOUNTER — ANTICOAGULATION VISIT (OUTPATIENT)
Dept: PHARMACY | Facility: HOSPITAL | Age: 64
End: 2019-02-05

## 2019-02-05 DIAGNOSIS — I63.10 CEREBROVASCULAR ACCIDENT (CVA) DUE TO EMBOLISM OF PRECEREBRAL ARTERY (HCC): ICD-10-CM

## 2019-02-05 LAB
INR PPP: 1.7 (ref 0.91–1.09)
PROTHROMBIN TIME: 20.9 SECONDS (ref 10–13.8)

## 2019-02-05 PROCEDURE — 36416 COLLJ CAPILLARY BLOOD SPEC: CPT

## 2019-02-05 PROCEDURE — G0463 HOSPITAL OUTPT CLINIC VISIT: HCPCS

## 2019-02-05 PROCEDURE — 85610 PROTHROMBIN TIME: CPT

## 2019-02-05 NOTE — PROGRESS NOTES
Anticoagulation Clinic Progress Note    Anticoagulation Summary  As of 2/5/2019    INR goal:   2.0-3.0   TTR:   0.0 % (1.1 wk)   INR used for dosing:      Warfarin maintenance plan:   4 mg on Sun, Thu; 6 mg all other days   Weekly warfarin total:   38 mg   Plan last modified:   Sera La RPH (2/5/2019)   Next INR check:   2/12/2019   Priority:   Critical   Target end date:       Indications    Cerebrovascular accident (CVA) due to embolism of precerebral artery (CMS/HCC) [I63.10]             Anticoagulation Episode Summary     INR check location:       Preferred lab:       [facility]    Comments:         Anticoagulation Care Providers     Provider Role Specialty Phone number    Amber Murguia MD Referring Cardiology [phone]          Clinic Interview:      INR History:  Anticoagulation Monitoring 1/28/2019 1/31/2019 2/5/2019   INR 1.7 1.9 -   INR Date 1/28/2019 1/31/2019 -   INR Goal 2.0-3.0 2.0-3.0 2.0-3.0   Trend Up Same Up   Last Week Total 38 mg 42 mg 34 mg   Next Week Total 34 mg 34 mg 38 mg   Sun - 4 mg 4 mg   Mon 6 mg 6 mg 6 mg   Tue 4 mg - 6 mg   Wed 6 mg - 6 mg   Thu 4 mg 4 mg 4 mg   Fri - 6 mg 6 mg   Sat - 4 mg 6 mg   Visit Report - - -   Some recent data might be hidden       Plan:  1. INR is Subtherapeutic today- see above in Anticoagulation Summary.  Will instruct Carlito MEHTA Chepe to Increase their warfarin regimen- see above in Anticoagulation Summary.  2. Follow up in 1 week  3. Patient declines warfarin refills.  4. Verbal and written information provided. Patient expresses understanding and has no further questions at this time.    Sera La RPH

## 2019-02-06 RX ORDER — POTASSIUM CHLORIDE 750 MG/1
20 TABLET, FILM COATED, EXTENDED RELEASE ORAL DAILY
Status: ON HOLD | COMMUNITY
End: 2019-04-25 | Stop reason: SDUPTHER

## 2019-02-06 RX ORDER — FUROSEMIDE 40 MG/1
40 TABLET ORAL DAILY
Status: ON HOLD | COMMUNITY
End: 2019-04-25 | Stop reason: SDUPTHER

## 2019-02-06 RX ORDER — INSULIN GLARGINE 100 [IU]/ML
14 INJECTION, SOLUTION SUBCUTANEOUS NIGHTLY
Status: ON HOLD | COMMUNITY
End: 2019-07-06 | Stop reason: SDUPTHER

## 2019-02-07 ENCOUNTER — HOSPITAL ENCOUNTER (OUTPATIENT)
Dept: SLEEP MEDICINE | Facility: HOSPITAL | Age: 64
Discharge: HOME | End: 2019-02-07
Attending: INTERNAL MEDICINE | Admitting: INTERNAL MEDICINE

## 2019-02-07 DIAGNOSIS — G47.33 OSA (OBSTRUCTIVE SLEEP APNEA): ICD-10-CM

## 2019-02-07 DIAGNOSIS — R06.83 SNORING: ICD-10-CM

## 2019-02-07 PROCEDURE — 95811 POLYSOM 6/>YRS CPAP 4/> PARM: CPT

## 2019-02-11 ENCOUNTER — TELEPHONE (OUTPATIENT)
Dept: SLEEP MEDICINE | Facility: HOSPITAL | Age: 64
End: 2019-02-11

## 2019-02-11 NOTE — TELEPHONE ENCOUNTER
Patient is to return call if he has questions about sleep study results or had a preferred DME (sending orders to [facility]) patient needs follow up as well

## 2019-02-12 ENCOUNTER — ANTICOAGULATION VISIT (OUTPATIENT)
Dept: PHARMACY | Facility: HOSPITAL | Age: 64
End: 2019-02-12

## 2019-02-12 DIAGNOSIS — I63.10 CEREBROVASCULAR ACCIDENT (CVA) DUE TO EMBOLISM OF PRECEREBRAL ARTERY (HCC): ICD-10-CM

## 2019-02-12 LAB
INR PPP: 1.9 (ref 0.91–1.09)
PROTHROMBIN TIME: 23.3 SECONDS (ref 10–13.8)

## 2019-02-12 PROCEDURE — 85610 PROTHROMBIN TIME: CPT

## 2019-02-12 PROCEDURE — 36416 COLLJ CAPILLARY BLOOD SPEC: CPT

## 2019-02-12 PROCEDURE — G0463 HOSPITAL OUTPT CLINIC VISIT: HCPCS

## 2019-02-12 NOTE — PROGRESS NOTES
Anticoagulation Clinic Progress Note    Anticoagulation Summary  As of 2019    INR goal:   2.0-3.0   TTR:   0.0 % (2.1 wk)   INR used for dosin.9! (2019)   Warfarin maintenance plan:   6 mg every day   Weekly warfarin total:   42 mg   Plan last modified:   Aron Ulloa RPH (2019)   Next INR check:   2019   Priority:   Critical   Target end date:       Indications    Cerebrovascular accident (CVA) due to embolism of precerebral artery (CMS/HCC) [I63.10]             Anticoagulation Episode Summary     INR check location:       Preferred lab:       Send INR reminders to:    [facility]    Comments:         Anticoagulation Care Providers     Provider Role Specialty Phone number    Amber Murguia MD Referring Cardiology [phone]          Clinic Interview:  Patient Findings     Negatives:   Signs/symptoms of thrombosis, Signs/symptoms of bleeding,   Laboratory test error suspected, Change in health, Change in alcohol use,   Change in activity, Upcoming invasive procedure, Emergency department   visit, Upcoming dental procedure, Missed doses, Extra doses, Change in   medications, Change in diet/appetite, Hospital admission, Bruising, Other   complaints      Clinical Outcomes     Negatives:   Major bleeding event, Thromboembolic event,   Anticoagulation-related hospital admission, Anticoagulation-related ED   visit, Anticoagulation-related fatality        INR History:  Anticoagulation Monitoring 2019   INR 1.9 1.7 1.9   INR Date 2019   INR Goal 2.0-3.0 2.0-3.0 2.0-3.0   Trend Same Up Up   Last Week Total 42 mg 34 mg 38 mg   Next Week Total 34 mg 38 mg 42 mg   Sun 4 mg 4 mg 6 mg   Mon 6 mg 6 mg 6 mg   Tue - 6 mg 6 mg   Wed - 6 mg 6 mg   Thu 4 mg 4 mg 6 mg   Fri 6 mg 6 mg 6 mg   Sat 4 mg 6 mg 6 mg   Visit Report - - -   Some recent data might be hidden       Plan:  1. INR is therapeutic today- see above in Anticoagulation  Summary.   Will instruct Carlito JANINE Mo to continue their warfarin regimen- see above in Anticoagulation Summary.  2. Follow up in 1 weeks.  3. Patient declines warfarin refills.  4. Verbal and written information provided. Patient expresses understanding and has no further questions at this time.    Victorina Perry

## 2019-02-12 NOTE — PROGRESS NOTES
Anticoagulation Clinic Progress Note    Anticoagulation Summary  As of 2019    INR goal:   2.0-3.0   TTR:   0.0 % (2.1 wk)   INR used for dosin.9! (2019)   Warfarin maintenance plan:   6 mg every day   Weekly warfarin total:   42 mg   Plan last modified:   Aron Ulloa RPH (2019)   Next INR check:   2019   Priority:   Critical   Target end date:       Indications    Cerebrovascular accident (CVA) due to embolism of precerebral artery (CMS/HCC) [I63.10]             Anticoagulation Episode Summary     INR check location:       Preferred lab:       Send INR reminders to:    [facility]    Comments:         Anticoagulation Care Providers     Provider Role Specialty Phone number    Amber Murguia MD Referring Cardiology [phone]          Clinic Interview:  Patient Findings     Negatives:   Signs/symptoms of thrombosis, Signs/symptoms of bleeding,   Laboratory test error suspected, Change in health, Change in alcohol use,   Change in activity, Upcoming invasive procedure, Emergency department   visit, Upcoming dental procedure, Missed doses, Extra doses, Change in   medications, Change in diet/appetite, Hospital admission, Bruising, Other   complaints      Clinical Outcomes     Negatives:   Major bleeding event, Thromboembolic event,   Anticoagulation-related hospital admission, Anticoagulation-related ED   visit, Anticoagulation-related fatality        INR History:  Anticoagulation Monitoring 2019   INR 1.9 1.7 1.9   INR Date 2019   INR Goal 2.0-3.0 2.0-3.0 2.0-3.0   Trend Same Up Up   Last Week Total 42 mg 34 mg 38 mg   Next Week Total 34 mg 38 mg 42 mg   Sun 4 mg 4 mg 6 mg   Mon 6 mg 6 mg 6 mg   Tue - 6 mg 6 mg   Wed - 6 mg 6 mg   Thu 4 mg 4 mg 6 mg   Fri 6 mg 6 mg 6 mg   Sat 4 mg 6 mg 6 mg   Visit Report - - -   Some recent data might be hidden       Plan:  1. INR is Subtherapeutic today- see above in Anticoagulation  Summary.  Will instruct Carlito MEHTA Chepe to Increase their warfarin regimen- see above in Anticoagulation Summary.  2. Follow up in 1 week  3. Patient declines warfarin refills.  4. Verbal and written information provided. Patient expresses understanding and has no further questions at this time.    Aron Ulloa [facility]

## 2019-02-19 ENCOUNTER — ANTICOAGULATION VISIT (OUTPATIENT)
Dept: PHARMACY | Facility: HOSPITAL | Age: 64
End: 2019-02-19

## 2019-02-19 DIAGNOSIS — I63.10 CEREBROVASCULAR ACCIDENT (CVA) DUE TO EMBOLISM OF PRECEREBRAL ARTERY (HCC): ICD-10-CM

## 2019-02-19 LAB
INR PPP: 2.1 (ref 0.91–1.09)
PROTHROMBIN TIME: 24.8 SECONDS (ref 10–13.8)

## 2019-02-19 PROCEDURE — 85610 PROTHROMBIN TIME: CPT

## 2019-02-19 PROCEDURE — 36416 COLLJ CAPILLARY BLOOD SPEC: CPT

## 2019-02-19 NOTE — PROGRESS NOTES
Anticoagulation Clinic Progress Note    Anticoagulation Summary  As of 2019    INR goal:   2.0-3.0   TTR:   15.6 % (3.1 wk)   INR used for dosin.1 (2019)   Warfarin maintenance plan:   6 mg every day   Weekly warfarin total:   42 mg   No change documented:   Margareth Diaz   Plan last modified:   Aron Ulloa [facility] (2019)   Next INR check:   3/5/2019   Priority:   High   Target end date:       Indications    Cerebrovascular accident (CVA) due to embolism of precerebral artery (CMS/HCC) [I63.10]             Anticoagulation Episode Summary     INR check location:       Preferred lab:       Send INR reminders to:    [facility]    Comments:         Anticoagulation Care Providers     Provider Role Specialty Phone number    Amber Murguia MD Referring Cardiology [phone]          Clinic Interview:  Patient Findings     Negatives:   Signs/symptoms of thrombosis, Signs/symptoms of bleeding,   Laboratory test error suspected, Change in health, Change in alcohol use,   Change in activity, Upcoming invasive procedure, Emergency department   visit, Upcoming dental procedure, Missed doses, Extra doses, Change in   medications, Change in diet/appetite, Hospital admission, Bruising, Other   complaints      Clinical Outcomes     Negatives:   Major bleeding event, Thromboembolic event,   Anticoagulation-related hospital admission, Anticoagulation-related ED   visit, Anticoagulation-related fatality        INR History:  Anticoagulation Monitoring 2019   INR 1.7 1.9 2.1   INR Date 2019   INR Goal 2.0-3.0 2.0-3.0 2.0-3.0   Trend Up Up Same   Last Week Total 34 mg 38 mg 42 mg   Next Week Total 38 mg 42 mg 42 mg   Sun 4 mg 6 mg 6 mg   Mon 6 mg 6 mg 6 mg   Tue 6 mg 6 mg 6 mg   Wed 6 mg 6 mg 6 mg   Thu 4 mg 6 mg 6 mg   Fri 6 mg 6 mg 6 mg   Sat 6 mg 6 mg 6 mg   Visit Report - - -   Some recent data might be hidden       Plan:  1. INR is  therapeutic today- see above in Anticoagulation Summary.   Will instruct Carlito Mo to continue their warfarin regimen- see above in Anticoagulation Summary.  2. Follow up in 2 weeks.  3. Patient declines warfarin refills.  4. Verbal and written information provided. Patient expresses understanding and has no further questions at this time.    Margareth Diaz

## 2019-02-25 ENCOUNTER — HOSPITAL ENCOUNTER (OUTPATIENT)
Dept: CT IMAGING | Facility: HOSPITAL | Age: 64
Discharge: HOME | End: 2019-02-25
Attending: INTERNAL MEDICINE | Admitting: INTERNAL MEDICINE

## 2019-02-25 ENCOUNTER — APPOINTMENT (OUTPATIENT)
Dept: CT IMAGING | Facility: HOSPITAL | Age: 64
End: 2019-02-25
Attending: INTERNAL MEDICINE

## 2019-02-25 DIAGNOSIS — R59.1 LAD (LYMPHADENOPATHY): ICD-10-CM

## 2019-02-25 PROCEDURE — 82565 ASSAY OF CREATININE: CPT

## 2019-02-25 PROCEDURE — 71260 CT THORAX DX C+: CPT

## 2019-02-25 PROCEDURE — 25010000002 IOPAMIDOL 61 % SOLUTION: Performed by: INTERNAL MEDICINE

## 2019-02-25 RX ADMIN — IOPAMIDOL 100 ML: 612 INJECTION, SOLUTION INTRAVENOUS at 13:17

## 2019-02-26 LAB — CREAT BLDA-MCNC: 1.9 MG/DL (ref 0.6–1.3)

## 2019-03-04 RX ORDER — WARFARIN SODIUM 2 MG/1
TABLET ORAL
Qty: 270 TABLET | Refills: 0 | Status: SHIPPED | OUTPATIENT
Start: 2019-03-04 | End: 2019-03-26 | Stop reason: SDUPTHER

## 2019-03-05 ENCOUNTER — ANTICOAGULATION VISIT (OUTPATIENT)
Dept: PHARMACY | Facility: HOSPITAL | Age: 64
End: 2019-03-05

## 2019-03-05 DIAGNOSIS — I63.10 CEREBROVASCULAR ACCIDENT (CVA) DUE TO EMBOLISM OF PRECEREBRAL ARTERY (HCC): ICD-10-CM

## 2019-03-05 LAB — INR PPP: 1.8 (ref 0.9–1.1)

## 2019-03-05 PROCEDURE — G0463 HOSPITAL OUTPT CLINIC VISIT: HCPCS

## 2019-03-05 PROCEDURE — 85610 PROTHROMBIN TIME: CPT

## 2019-03-05 PROCEDURE — 36416 COLLJ CAPILLARY BLOOD SPEC: CPT

## 2019-03-05 NOTE — PROGRESS NOTES
Anticoagulation Clinic Progress Note    Anticoagulation Summary  As of 3/5/2019    INR goal:   2.0-3.0   TTR:   22.2 % (1.2 mo)   INR used for dosin.80! (3/5/2019)   Warfarin maintenance plan:   6 mg every day   Weekly warfarin total:   42 mg   Plan last modified:   Sera La RPH (3/5/2019)   Next INR check:   3/12/2019   Priority:   High   Target end date:       Indications    Cerebrovascular accident (CVA) due to embolism of precerebral artery (CMS/HCC) [I63.10]             Anticoagulation Episode Summary     INR check location:       Preferred lab:       [facility] [place]    Comments:         Anticoagulation Care Providers     Provider Role Specialty Phone number    Amber Murguia MD Referring Cardiology [phone]          Clinic Interview:      INR History:  Anticoagulation Monitoring 2019 2019 3/5/2019   INR 1.9 2.1 1.80   INR Date 2019 2019 3/5/2019   INR Goal 2.0-3.0 2.0-3.0 2.0-3.0   Trend Up Same Same   Last Week Total 38 mg 42 mg 40 mg   Next Week Total 42 mg 42 mg 44 mg   Sun 6 mg 6 mg 6 mg   Mon 6 mg 6 mg 6 mg   Tue 6 mg 6 mg 8 mg (3/5)   Wed 6 mg 6 mg 6 mg   Thu 6 mg 6 mg 6 mg   Fri 6 mg 6 mg 6 mg   Sat 6 mg 6 mg 6 mg   Visit Report - - -   Some recent data might be hidden       Plan:  1. INR is Subtherapeutic today- see above in Anticoagulation Summary.  Will instruct Carlito Mo to Continue their warfarin regimen- see above in Anticoagulation Summary.  He will take 8mg today instead of 6mg then resume 6mg daily.  2. Follow up in 1 week  3. Patient declines warfarin refills.  4. Verbal and written information provided. Patient expresses understanding and has no further questions at this time.    Sera La RPH

## 2019-03-12 ENCOUNTER — ANTICOAGULATION VISIT (OUTPATIENT)
Dept: PHARMACY | Facility: HOSPITAL | Age: 64
End: 2019-03-12

## 2019-03-12 DIAGNOSIS — I63.10 CEREBROVASCULAR ACCIDENT (CVA) DUE TO EMBOLISM OF PRECEREBRAL ARTERY (HCC): ICD-10-CM

## 2019-03-12 LAB
INR PPP: 2.5 (ref 0.91–1.09)
PROTHROMBIN TIME: 29.9 SECONDS (ref 10–13.8)

## 2019-03-12 PROCEDURE — 85610 PROTHROMBIN TIME: CPT

## 2019-03-12 PROCEDURE — 36416 COLLJ CAPILLARY BLOOD SPEC: CPT

## 2019-03-12 NOTE — PROGRESS NOTES
Anticoagulation Clinic Progress Note    Anticoagulation Summary  As of 3/12/2019    INR goal:   2.0-3.0   TTR:   30.8 % (1.4 mo)   INR used for dosin.5 (3/12/2019)   Warfarin maintenance plan:   6 mg every day   Weekly warfarin total:   42 mg   Plan last modified:   Sera La RPH (3/5/2019)   Next INR check:   3/26/2019   Priority:   High   Target end date:       Indications    Cerebrovascular accident (CVA) due to embolism of precerebral artery (CMS/HCC) [I63.10]             Anticoagulation Episode Summary     INR check location:       Preferred lab:       [facility] [place]    Comments:         Anticoagulation Care Providers     Provider Role Specialty Phone number    Amber Murguia MD Referring Cardiology [phone]          Clinic Interview:      INR History:  Anticoagulation Monitoring 2019 3/5/2019 3/12/2019   INR 2.1 1.80 2.5   INR Date 2019 3/5/2019 3/12/2019   INR Goal 2.0-3.0 2.0-3.0 2.0-3.0   Trend Same Same Same   Last Week Total 42 mg 40 mg 44 mg   Next Week Total 42 mg 44 mg 42 mg   Sun 6 mg 6 mg 6 mg   Mon 6 mg 6 mg 6 mg   Tue 6 mg 8 mg (3/5) 6 mg   Wed 6 mg 6 mg 6 mg   Thu 6 mg 6 mg 6 mg   Fri 6 mg 6 mg 6 mg   Sat 6 mg 6 mg 6 mg   Visit Report - - -   Some recent data might be hidden       Plan:  1. INR is Therapeutic today- see above in Anticoagulation Summary.  Will instruct Carlito JANINE Mo to Continue their warfarin regimen- see above in Anticoagulation Summary.  2. Follow up in 2 weeks  3. Patient declines warfarin refills.  4. Verbal and written information provided. Patient expresses understanding and has no further questions at this time.    Sera La RPH

## 2019-03-22 ENCOUNTER — TELEPHONE (OUTPATIENT)
Dept: SLEEP MEDICINE | Facility: HOSPITAL | Age: 64
End: 2019-03-22

## 2019-03-26 ENCOUNTER — ANTICOAGULATION VISIT (OUTPATIENT)
Dept: PHARMACY | Facility: HOSPITAL | Age: 64
End: 2019-03-26

## 2019-03-26 DIAGNOSIS — I63.10 CEREBROVASCULAR ACCIDENT (CVA) DUE TO EMBOLISM OF PRECEREBRAL ARTERY (HCC): ICD-10-CM

## 2019-03-26 LAB
INR PPP: 2.5 (ref 0.91–1.09)
PROTHROMBIN TIME: 29.7 SECONDS (ref 10–13.8)

## 2019-03-26 PROCEDURE — 36416 COLLJ CAPILLARY BLOOD SPEC: CPT

## 2019-03-26 PROCEDURE — 85610 PROTHROMBIN TIME: CPT

## 2019-03-26 RX ORDER — WARFARIN SODIUM 2 MG/1
TABLET ORAL
Qty: 270 TABLET | Refills: 0 | Status: SHIPPED | OUTPATIENT
Start: 2019-03-26 | End: 2019-04-23

## 2019-03-26 NOTE — PROGRESS NOTES
Anticoagulation Clinic Progress Note    Anticoagulation Summary  As of 3/26/2019    INR goal:   2.0-3.0   TTR:   47.6 % (1.9 mo)   INR used for dosin.5 (3/26/2019)   Warfarin maintenance plan:   6 mg every day   Weekly warfarin total:   42 mg   No change documented:   Kae Alberts   Plan last modified:   Sera La RPH (3/5/2019)   Next INR check:   2019   Priority:   High   Target end date:       Indications    Cerebrovascular accident (CVA) due to embolism of precerebral artery (CMS/HCC) [I63.10]             Anticoagulation Episode Summary     INR check location:       Preferred lab:       Send INR reminders to:    [facility]    Comments:         Anticoagulation Care Providers     Provider Role Specialty Phone number    Amber Murguia MD Referring Cardiology [phone]          Clinic Interview:  Patient Findings     Negatives:   Signs/symptoms of thrombosis, Signs/symptoms of bleeding,   Laboratory test error suspected, Change in health, Change in alcohol use,   Change in activity, Upcoming invasive procedure, Emergency department   visit, Upcoming dental procedure, Missed doses, Extra doses, Change in   medications, Change in diet/appetite, Hospital admission, Bruising, Other   complaints      Clinical Outcomes     Negatives:   Major bleeding event, Thromboembolic event,   Anticoagulation-related hospital admission, Anticoagulation-related ED   visit, Anticoagulation-related fatality        INR History:  Anticoagulation Monitoring 3/5/2019 3/12/2019 3/26/2019   INR 1.80 2.5 2.5   INR Date 3/5/2019 3/12/2019 3/26/2019   INR Goal 2.0-3.0 2.0-3.0 2.0-3.0   Trend Same Same Same   Last Week Total 40 mg 44 mg 42 mg   Next Week Total 44 mg 42 mg 42 mg   Sun 6 mg 6 mg 6 mg   Mon 6 mg 6 mg 6 mg   Tue 8 mg (3/5) 6 mg 6 mg   Wed 6 mg 6 mg 6 mg   Thu 6 mg 6 mg 6 mg   Fri 6 mg 6 mg 6 mg   Sat 6 mg 6 mg 6 mg   Visit Report - - -   Some recent data might be hidden       Plan:  1. INR is  therapeutic today- see above in Anticoagulation Summary.   Will instruct Carlito Mo to continue their warfarin regimen- see above in Anticoagulation Summary.  2. Follow up in 4 weeks.  3. Patient desires warfarin refills.  4. Verbal and written information provided. Patient expresses understanding and has no further questions at this time.    Kae Alberts

## 2019-04-18 ENCOUNTER — OFFICE VISIT (OUTPATIENT)
Dept: CARDIOLOGY | Facility: CLINIC | Age: 64
End: 2019-04-18

## 2019-04-18 VITALS
HEART RATE: 74 BPM | WEIGHT: 196.8 LBS | HEIGHT: 73 IN | SYSTOLIC BLOOD PRESSURE: 130 MMHG | BODY MASS INDEX: 26.08 KG/M2 | DIASTOLIC BLOOD PRESSURE: 78 MMHG

## 2019-04-18 DIAGNOSIS — I50.42 CHRONIC COMBINED SYSTOLIC AND DIASTOLIC CONGESTIVE HEART FAILURE (HCC): ICD-10-CM

## 2019-04-18 DIAGNOSIS — N18.30 CKD (CHRONIC KIDNEY DISEASE) STAGE 3, GFR 30-59 ML/MIN (HCC): ICD-10-CM

## 2019-04-18 DIAGNOSIS — I63.10 CEREBROVASCULAR ACCIDENT (CVA) DUE TO EMBOLISM OF PRECEREBRAL ARTERY (HCC): ICD-10-CM

## 2019-04-18 DIAGNOSIS — I10 ESSENTIAL HYPERTENSION: ICD-10-CM

## 2019-04-18 DIAGNOSIS — I49.3 FREQUENT PVCS: Primary | ICD-10-CM

## 2019-04-18 DIAGNOSIS — E78.49 OTHER HYPERLIPIDEMIA: ICD-10-CM

## 2019-04-18 DIAGNOSIS — I25.10 CORONARY ARTERY DISEASE INVOLVING NATIVE CORONARY ARTERY OF NATIVE HEART WITHOUT ANGINA PECTORIS: ICD-10-CM

## 2019-04-18 PROCEDURE — 93000 ELECTROCARDIOGRAM COMPLETE: CPT | Performed by: NURSE PRACTITIONER

## 2019-04-18 PROCEDURE — 99214 OFFICE O/P EST MOD 30 MIN: CPT | Performed by: NURSE PRACTITIONER

## 2019-04-18 RX ORDER — BUPROPION HYDROCHLORIDE 150 MG/1
150 TABLET ORAL DAILY
Status: ON HOLD | COMMUNITY
Start: 2019-02-07 | End: 2021-05-21

## 2019-04-18 NOTE — PROGRESS NOTES
Date of Office Visit: 2019  Encounter Provider: Nadine Blake, JULIO C, APRN  Place of Service: [facility] CARDIOLOGY  Patient Name: Carlito Mo  :1955        Subjective:     Chief Complaint:  Follow-up, coronary artery disease, congestive heart failure, anticoagulation therapy, frequent PVCs.      History of Present Illness:  Carlito Mo is a 63 y.o. male patient of Dr. Murguia.  I am seeing this patient in the office today and I reviewed his records.    Patient has a history of coronary artery disease, congestive heart failure, respiratory failure, chronic kidney disease, hypothyroidism, type 2 diabetes.     Patient was first seen by Dr. Murguia 2017 when he was admitted with altered mental status.  He was diagnosed with multiple small acute infarcts.  Echocardiogram 2017 showed mild left ventricular hypertrophy, mild left atrial enlargement, negative bubble study, normal LV systolic function with an ejection fraction of 60%.  Transesophageal echo 2017 showed that his LV function was mildly low with an ejection fraction of 45% with focal wall motion abnormalities primarily in the septum and anterior wall and apex.  Small patent foramen ovale was noted and no significant valvular heart disease or cardiac source of emboli minus a small PFO were seen.  Holter monitor showed a couple of short bursts of SVT but no sustained arrhythmia.     He came back to the hospital in 2017 again with altered mental status.  At this time he had an echocardiogram which shows ejection fraction was 63%, grade 1 diastolic dysfunction and no significant valvular heart disease.  He wore a Zio patch which showed PACs, PVCs and nonsustained SVT.     He was back in the hospital in May 2018 with acute mental status changes and a blood pressure of 212/109.  He was noted at that time to have a chronically elevated troponin without acute EKG changes.     Patient was last  seen in office by Dr. Murguia 7/31/18.  At this point it was noted that this was the first time he had been seen in the office.  His biggest complaint was fatigue.  His wife thought that he may be depressed.  He was not sleeping well at night.  He would stay home a lot in bed.  He reported shortness of breath both at rest and with exertion.  He denied chest pain or palpitations.  He reported some chronic left leg swelling but no acute edema.  He was instructed to follow-up in 6 months or sooner if needed.     Patient was admitted to the hospital 9/19/18-9/24/18 due to postoperative hypoxia and persistent lethargy after having a colonoscopy.  He underwent workup for the hypoxia, including chest x-ray followed by chest CT and these were suggestive of pneumonia.  Patient had a significantly elevated pro calcitonin and white blood cell count.  He was started on Zosyn and responded well to antibiotics.  He was transitioned to Augmentin to complete one week total course of antibiotics.  He was seen by cardiology for potential heart failure exacerbation.  He was found to have a reduced ejection fraction compared with prior studies and was felt to have mild exacerbation of heart failure.  Patient with diuresis.  He was able to be taken off of oxygen during the day and only required 2 L at night.  He was prescribed oxygen to wear at nighttime upon discharge.  He had some urinary retention and was seen by urology and started on Flomax.  Patient was felt to be medically stable.  He was discharged home and instructed to follow-up outpatient with gastroenterology, cardiology, and primary care.     Patient presented to the ER 11/20/18 with intermittent dyspnea on exertion.  He had a CT of his chest at [facility] that showed bilateral pleural effusions and pulmonary edema.  He was referred to the ED.  Upon arrival blood pressure was 149/83.  Heart rate 62 bpm.  EKG showed sinus bradycardia.  He was treated with IV Lasix and admitted  for further evaluation.  He mainly noticed the shortness of breath with activities, and felt like it had been worsening over the last several weeks.  He had not had any true chest pain, although he did have some pressure when he was short of breath.  He also seemed confused at times, although this had been noted in the past as well.  He diuresed well in the hospital.  His condition improved.  He was changed to metoprolol succinate since tartrate was not dictated for CHF.     Patient had echocardiogram done 12/2/18:  Interpretation Summary   · There is severe hypokinesis of the distal anteroseptum and the anterior apex.  · Left ventricular systolic function is mildly decreased. Calculated EF = 43%  · Normal right ventricular systolic function noted. Right ventricular cavity is mildly dilated.  · Left atrial cavity size is dilated.  · The mitral valve leaflets are thickened and myxomatous. There is mild bileaflet mitral valve prolapse.  · Mild mitral valve regurgitation is present  · There is a trivial pericardial effusion.        Patient was seen back in office by Dr. Murguia 1/18/19.  At this point he denied chest pain.  He has some shortness of breath going down the stairs.  Did not notice as much going up the stairs.  He was noted to have increased emotional stress.  Mild lower extremity swelling seen.  No orthopnea.  Had one episode of PND a week or so prior which had resolved when he got up to go the bathroom.  He was started on anticoagulation with warfarin due to history of chronic kidney disease.  He was referred to the anticoagulation clinic.  Was instructed to follow-up in 3 months.      Patient presents to office today for follow-up appointment.  Patient reports he has been doing okay overall since last visit.  About the same.  He is still under a lot of stress due to caring for his autistic son.  He reports that he does not get enough sleep.  On days when he is very deficient in sleep he has some dizziness,  which is better after taking a nap.  No other associated symptoms.  He has some chronic lower extremity swelling, however he reports that is at baseline today.  Left greater than right, which she reports is chronic due to history of vein grafting from left lower extremity.  He has some chronic fatigue, about the same as last visit.  History of obstructive sleep apnea and using CPAP every night.  Denies chest pain, shortness of breath, shortness of breath with exertion, palpitations, fluttering sensation, racing heartbeat sensation, syncope, near syncope, falls, or abnormal bleeding. It appears that INRs have been therapeutic since 2/19/19.  He went into the bigeminy on the end of his EKG today.  He had frequent extrasystoles when auscultated, however not still in bigeminy by the sound of it.  He reports he drinks about 44 ounces of caffeine a day.  He was advised to cut this back to no more than 8 ounces of caffeine, if he must have some.  Completely asymptomatic and not having any symptoms in the office while having PVCs.  Would like to get a 24-hour Holter monitor to see PVC burden.  We will get this scheduled today.  Blood pressures have been elevated recently at home, however he has been checking them first thing in the morning before medications.  Blood pressure is normal in the office today.  Recommended that he check blood pressure at least 2 hours after morning medications after resting 10-15 minutes, keep a log, and call if they are staying greater than 130 systolic or 80 diastolic.  Patient verbalized understanding of plan.  He has not been measuring weight using consistent scale.  Recommend he use the same scale every morning and measure weight first thing in the morning before eating or drinking anything after emptying bladder.  Keep a log and call if he gains 3 pounds in 1 day or 5 pounds in a week.    Patient will follow up with Dr. Murguia in 6 months or sooner if needed for any new, recurrent, or  worsening symptoms or other problems/concerns.  He would also like to follow-up with APRN in the meantime in 3 months.        Past Medical History:   Diagnosis Date   • Acquired hypothyroidism    • Acute congestive heart failure (CMS/HCC)    • Acute respiratory failure with hypoxia (CMS/HCC)    • Acute sinusitis    • SYLVAIN (acute kidney injury) (CMS/HCC)     on CKD   • Anemia    • Arthritis    • Cancer (CMS/HCC)     skin   • Chronic ischemic heart disease    • CKD (chronic kidney disease)    • Depression    • Diabetes mellitus type 2, uncontrolled, without complications (CMS/HCC)    • Disease of thyroid gland    • Fatigue    • Heart attack (CMS/HCC)    • History of coronary artery disease     with remote history of bypass surgery in 2001   • History of transfusion    • Hyperglycemia    • Hyperlipidemia    • Hypertension    • Hypertensive encephalopathy    • Mental status change     Acute   • Pleural effusion    • Pneumonia    • RBBB (right bundle branch block)    • Screening for prostate cancer 2011   • Stroke (CMS/HCC)    • TIA (transient ischemic attack)    • Type 2 diabetes mellitus (CMS/HCC)    • Urinary retention    • Vitamin D deficiency      Past Surgical History:   Procedure Laterality Date   • APPENDECTOMY N/A 02/14/2016    Dr. Alexey Dodson   • COLONOSCOPY      over 20 years ago.  no polyps    • COLONOSCOPY N/A 9/18/2018    Procedure: COLONOSCOPY;  Surgeon: Jose Enrique Louie MD;  Location: [facility] ENDOSCOPY;  Service: Gastroenterology   • COLONOSCOPY N/A 9/19/2018    IH, EH, polyps (TAs w/low grade dysplasia)   • CORONARY ARTERY BYPASS GRAFT  11/2001   • TONSILLECTOMY     • VASECTOMY       Outpatient Medications Prior to Visit   Medication Sig Dispense Refill   • buPROPion XL (WELLBUTRIN XL) 150 MG 24 hr tablet Take 1 tablet by mouth Daily.     • docusate sodium (COLACE) 100 MG capsule Take 1 capsule by mouth 2 (two) times a day. (Patient taking differently: Take 100 mg by mouth As Needed for  Constipation.) 30 capsule 0   • furosemide (LASIX) 40 MG tablet Take 40 mg by mouth Daily. Half tablet daily     • Insulin Glargine (BASAGLAR KWIKPEN) 100 UNIT/ML injection pen Inject 14 Units under the skin into the appropriate area as directed Every Night.     • Insulin Lispro (ADMELOG SOLOSTAR SC) Inject 2 Units under the skin into the appropriate area as directed 3 (Three) Times a Day.     • Insulin Pen Needle (B-D ULTRAFINE III SHORT PEN) 31G X 8 MM misc Use to inject 4x daily 150 each 5   • levothyroxine (SYNTHROID, LEVOTHROID) 75 MCG tablet Take 75 mcg by mouth Daily.     • losartan (COZAAR) 100 MG tablet Take 100 mg by mouth Daily.     • potassium chloride (K-DUR) 10 MEQ CR tablet Take 10 mEq by mouth 2 (Two) Times a Day.     • RELION GLUCOSE TEST STRIPS test strip 1 each by Other route 2 (two) times a day. Use as instructed     • tamsulosin (FLOMAX) 0.4 MG capsule 24 hr capsule Take 1 capsule by mouth Every Night.     • vitamin D (ERGOCALCIFEROL) 64751 units capsule capsule Take 1 cap 3 times weekly 24 capsule 1   • warfarin (COUMADIN) 2 MG tablet Take three tablets by mouth daily, or as directed by the [facility]. 270 tablet 0   • amLODIPine (NORVASC) 5 MG tablet Take 1 tablet by mouth Daily for 180 days. 90 tablet 1   • aspirin 81 MG EC tablet Take 1 tablet by mouth Daily for 180 days. 90 tablet 1   • atorvastatin (LIPITOR) 40 MG tablet Take 1 tablet by mouth Every Night for 180 days. 90 tablet 1   • ipratropium-albuterol (COMBIVENT RESPIMAT)  MCG/ACT inhaler Inhale 1 puff Every 6 (Six) Hours.     • zolpidem (AMBIEN) 5 MG tablet Take 1 tablet by mouth At Night As Needed for Sleep. Bring the medication to the sleep lab  DO NOT USE AT HOME 2 tablet 0     No facility-administered medications prior to visit.        Allergies as of 04/18/2019 - Reviewed 04/18/2019   Allergen Reaction Noted   • Fluoxetine Other (See Comments) 03/12/2018   • Prozac [fluoxetine hcl] Other (See Comments)  "12/17/2015     Social History     Socioeconomic History   • Marital status:      Spouse name: Not on file   • Number of children: Not on file   • Years of education: Not on file   • Highest education level: Not on file   Tobacco Use   • Smoking status: Never Smoker   • Smokeless tobacco: Never Used   • Tobacco comment: daily caffiene   Substance and Sexual Activity   • Alcohol use: No   • Drug use: No   • Sexual activity: Defer     Family History   Problem Relation Age of Onset   • Diabetes Mother    • Hypertension Mother    • Macular degeneration Mother    • Alcohol abuse Father    • Cancer Father         lung   • Alcohol abuse Brother        Review of Systems   Constitution: Negative for chills, fever, malaise/fatigue, weight gain and weight loss.   HENT: Negative for ear pain, hearing loss, nosebleeds and sore throat.    Eyes: Negative for blurred vision, double vision, redness, vision loss in left eye, vision loss in right eye and visual disturbance.   Cardiovascular:        SEE HPI.    Respiratory: Negative for cough, shortness of breath, snoring and wheezing.    Endocrine: Negative for cold intolerance and heat intolerance.   Skin: Negative for itching, rash and suspicious lesions.   Musculoskeletal: Negative for joint pain, joint swelling and myalgias.   Gastrointestinal: Negative for abdominal pain, diarrhea, hematemesis, melena, nausea and vomiting.   Genitourinary: Negative for dysuria, frequency and hematuria.   Neurological: Negative for dizziness, headaches, numbness, paresthesias and seizures.   Psychiatric/Behavioral: Negative for altered mental status and depression. The patient is not nervous/anxious.           Objective:     Vitals:    04/18/19 1519   BP: 130/78   BP Location: Right arm   Pulse: 74   Weight: 89.3 kg (196 lb 12.8 oz)   Height: 185.4 cm (73\")     Body mass index is 25.96 kg/m².      PHYSICAL EXAM:  Physical Exam   Constitutional: He is oriented to person, place, and time. He " appears well-developed and well-nourished. No distress.   HENT:   Head: Normocephalic and atraumatic.   Eyes: Pupils are equal, round, and reactive to light.   Neck: Neck supple. No JVD present. Carotid bruit is not present. No tracheal deviation present.   Cardiovascular: Normal rate, regular rhythm, normal heart sounds and intact distal pulses. Frequent extrasystoles are present. Exam reveals no gallop and no friction rub.   No murmur heard.  Pulses:       Radial pulses are 2+ on the right side, and 2+ on the left side.        Posterior tibial pulses are 2+ on the right side, and 2+ on the left side.   Pulmonary/Chest: Effort normal and breath sounds normal. No respiratory distress. He has no wheezes. He has no rales.   Abdominal: Soft. Bowel sounds are normal. He exhibits no distension. There is no tenderness.   Musculoskeletal: He exhibits edema (Trace edema to bilateral ankles). He exhibits no tenderness or deformity.   Neurological: He is alert and oriented to person, place, and time.   Skin: Skin is warm and dry. No rash noted. He is not diaphoretic. No erythema.   Psychiatric: He has a normal mood and affect. His behavior is normal. Judgment normal.           ECG 12 Lead  Date/Time: 4/18/2019 3:37 PM  Performed by: Nadine Blake DNP, APRN  Authorized by: Nadine Blake DNP, APRN   Comparison: compared with previous ECG from 1/18/2019  Comparison to previous ECG: PVCs not seen on previous ECG.  Rhythm: sinus rhythm  Ectopy comments: Frequent PVCs  Rate: normal  BPM: 74  Conduction: right bundle branch block    Clinical impression: abnormal EKG            Assessment:       Diagnosis Plan   1. Frequent PVCs  Holter Monitor - 24 Hour   2. Chronic combined systolic and diastolic congestive heart failure (CMS/HCC)     3. Coronary artery disease involving native coronary artery of native heart without angina pectoris     4. Cerebrovascular accident (CVA) due to embolism of precerebral artery (CMS/HCC)      5. Essential hypertension     6. Other hyperlipidemia     7. CKD (chronic kidney disease) stage 3, GFR 30-59 ml/min (CMS/[facility])           Plan:     1. Frequent PVCs: Went into bigeminy on the end of EKG, however on auscultation he was no longer in bigeminy.  Ordering 24-hour Holter monitor today.  Patient currently drinking 44 ounces of caffeine a day.  Recommended that he drink no more than 8 ounces a day, and he must have some caffeine.  Limit as much as possible.  2. Chronic systolic and diastolic heart failure: Remains on Lasix.  Appears euvolemic in the office today.  Lungs clear.  Monitor daily weights and call if he gains 3 pounds in 1 day or 5 pounds in a week.  3. Coronary artery disease: With remote history of bypass graft in 2001.  Denies anginal symptoms.  4. History of stroke: Patient had further strokes on aspirin and Plavix.  He was started on warfarin anticoagulation therapy.  Managed by anticoagulation clinic.  5. Hypertension: Blood pressure well controlled in the office today.  However patient reports some elevated readings outside the office, often in the 140s.  However he has been checking this first thing in the morning before taking medications.  Recommended checking blood pressure at least 2 hours after morning medications after resting 10-15 minutes, keep a log, and call if blood pressure staying greater than 130/80.  He verbalized understanding.  6. Hyperlipidemia: Managed by outside provider.  On atorvastatin.  7. Diabetes: Managed by outside provider.  8. Chronic kidney disease: Managed by outside provider.  9. Right bundle branch block.  10. Chronically elevated troponin.    Patient will follow up with Dr. Murguia in 6 months or sooner if needed for any new, recurrent, or worsening symptoms or other problems/concerns.  He would also like to follow-up with APRN in the meantime in 3 months.    Greater than 13 minutes of 25-minute office visit was spent face-to-face with patient discussing  blood pressure monitoring, heart rate monitoring, monitoring of symptoms of heart failure, watching salt intake, and recommendations to use compression socks, recommendations for Holter monitor and limiting caffeine, as well as findings and symptoms that warrant sooner call to our office, sooner follow-up visit, or ER visit.           Your medication list           Accurate as of 4/18/19  4:11 PM. If you have any questions, ask your nurse or doctor.               CHANGE how you take these medications      Instructions Last Dose Given Next Dose Due   docusate sodium 100 MG capsule  Commonly known as:  COLACE  What changed:    · when to take this  · reasons to take this      Take 1 capsule by mouth 2 (two) times a day.          CONTINUE taking these medications      Instructions Last Dose Given Next Dose Due   ADMELOG SOLOSTAR SC      Inject 2 Units under the skin into the appropriate area as directed 3 (Three) Times a Day.       amLODIPine 5 MG tablet  Commonly known as:  NORVASC      Take 1 tablet by mouth Daily for 180 days.       aspirin 81 MG EC tablet      Take 1 tablet by mouth Daily for 180 days.       atorvastatin 40 MG tablet  Commonly known as:  LIPITOR      Take 1 tablet by mouth Every Night for 180 days.       buPROPion  MG 24 hr tablet  Commonly known as:  WELLBUTRIN XL      Take 1 tablet by mouth Daily.       furosemide 40 MG tablet  Commonly known as:  LASIX      Take 40 mg by mouth Daily. Half tablet daily       Insulin Glargine 100 UNIT/ML injection pen  Commonly known as:  BASAGLAR KWIKPEN      Inject 14 Units under the skin into the appropriate area as directed Every Night.       Insulin Pen Needle 31G X 8 MM misc  Commonly known as:  B-D ULTRAFINE III SHORT PEN      Use to inject 4x daily       levothyroxine 75 MCG tablet  Commonly known as:  SYNTHROID, LEVOTHROID      Take 75 mcg by mouth Daily.       losartan 100 MG tablet  Commonly known as:  COZAAR      Take 100 mg by mouth Daily.        potassium chloride 10 MEQ CR tablet  Commonly known as:  K-DUR      Take 10 mEq by mouth 2 (Two) Times a Day.       RELION GLUCOSE TEST STRIPS test strip  Generic drug:  glucose blood      1 each by Other route 2 (two) times a day. Use as instructed       tamsulosin 0.4 MG capsule 24 hr capsule  Commonly known as:  FLOMAX      Take 1 capsule by mouth Every Night.       vitamin D 48705 units capsule capsule  Commonly known as:  ERGOCALCIFEROL      Take 1 cap 3 times weekly       warfarin 2 MG tablet  Commonly known as:  COUMADIN      Take three tablets by mouth daily, or as directed by the [facility].          STOP taking these medications    ipratropium-albuterol  MCG/ACT inhaler  Commonly known as:  COMBIVENT RESPIMAT  Stopped by:  Nadine Blake DNP, APRN        zolpidem 5 MG tablet  Commonly known as:  AMBIEN  Stopped by:  Nadine Blake DNP, APRN               I did not stop or change the above medications.  Patient's medication list was updated to reflect medications they are currently taking including medication changes made by other providers.          Thanks,    Nadine Blake DNP, APRN  04/18/2019             Dictated utilizing Dragon dictation

## 2019-04-23 ENCOUNTER — APPOINTMENT (OUTPATIENT)
Dept: GENERAL RADIOLOGY | Facility: HOSPITAL | Age: 64
End: 2019-04-23

## 2019-04-23 ENCOUNTER — HOSPITAL ENCOUNTER (OUTPATIENT)
Facility: HOSPITAL | Age: 64
Setting detail: OBSERVATION
Discharge: HOME | End: 2019-04-26
Attending: EMERGENCY MEDICINE | Admitting: INTERNAL MEDICINE

## 2019-04-23 DIAGNOSIS — I25.10 CORONARY ARTERY DISEASE INVOLVING NATIVE CORONARY ARTERY, ANGINA PRESENCE UNSPECIFIED, UNSPECIFIED WHETHER NATIVE OR TRANSPLANTED HEART: ICD-10-CM

## 2019-04-23 DIAGNOSIS — I49.3 PVC (PREMATURE VENTRICULAR CONTRACTION): ICD-10-CM

## 2019-04-23 DIAGNOSIS — I25.118 CORONARY ARTERY DISEASE INVOLVING NATIVE CORONARY ARTERY OF NATIVE HEART WITH OTHER FORM OF ANGINA PECTORIS (HCC): Primary | ICD-10-CM

## 2019-04-23 LAB
ALBUMIN SERPL-MCNC: 3.8 G/DL (ref 3.5–5.2)
ALBUMIN/GLOB SERPL: 1.6 G/DL
ALP SERPL-CCNC: 79 U/L (ref 39–117)
ALT SERPL W P-5'-P-CCNC: 24 U/L (ref 1–41)
ANION GAP SERPL CALCULATED.3IONS-SCNC: 12.2 MMOL/L
AST SERPL-CCNC: 23 U/L (ref 1–40)
BASOPHILS # BLD AUTO: 0.01 10*3/MM3 (ref 0–0.2)
BASOPHILS NFR BLD AUTO: 0.2 % (ref 0–1.5)
BILIRUB SERPL-MCNC: 0.3 MG/DL (ref 0.2–1.2)
BUN BLD-MCNC: 31 MG/DL (ref 8–23)
BUN/CREAT SERPL: 14.2 (ref 7–25)
CALCIUM SPEC-SCNC: 8.7 MG/DL (ref 8.6–10.5)
CHLORIDE SERPL-SCNC: 101 MMOL/L (ref 98–107)
CO2 SERPL-SCNC: 23.8 MMOL/L (ref 22–29)
CREAT BLD-MCNC: 2.19 MG/DL (ref 0.76–1.27)
DEPRECATED RDW RBC AUTO: 39.4 FL (ref 37–54)
EOSINOPHIL # BLD AUTO: 0.22 10*3/MM3 (ref 0–0.4)
EOSINOPHIL NFR BLD AUTO: 3.4 % (ref 0.3–6.2)
ERYTHROCYTE [DISTWIDTH] IN BLOOD BY AUTOMATED COUNT: 13 % (ref 12.3–15.4)
GFR SERPL CREATININE-BSD FRML MDRD: 31 ML/MIN/1.73
GLOBULIN UR ELPH-MCNC: 2.4 GM/DL
GLUCOSE BLD-MCNC: 195 MG/DL (ref 65–99)
GLUCOSE BLDC GLUCOMTR-MCNC: 214 MG/DL (ref 70–130)
HCT VFR BLD AUTO: 32.2 % (ref 37.5–51)
HGB BLD-MCNC: 10 G/DL (ref 13–17.7)
HOLD SPECIMEN: NORMAL
HOLD SPECIMEN: NORMAL
IMM GRANULOCYTES # BLD AUTO: 0.02 10*3/MM3 (ref 0–0.05)
IMM GRANULOCYTES NFR BLD AUTO: 0.3 % (ref 0–0.5)
INR PPP: 1.74 (ref 0.9–1.1)
LYMPHOCYTES # BLD AUTO: 1.14 10*3/MM3 (ref 0.7–3.1)
LYMPHOCYTES NFR BLD AUTO: 17.4 % (ref 19.6–45.3)
MAGNESIUM SERPL-MCNC: 1.9 MG/DL (ref 1.6–2.4)
MCH RBC QN AUTO: 25.6 PG (ref 26.6–33)
MCHC RBC AUTO-ENTMCNC: 31.1 G/DL (ref 31.5–35.7)
MCV RBC AUTO: 82.4 FL (ref 79–97)
MONOCYTES # BLD AUTO: 0.56 10*3/MM3 (ref 0.1–0.9)
MONOCYTES NFR BLD AUTO: 8.6 % (ref 5–12)
NEUTROPHILS # BLD AUTO: 4.59 10*3/MM3 (ref 1.7–7)
NEUTROPHILS NFR BLD AUTO: 70.1 % (ref 42.7–76)
NRBC BLD AUTO-RTO: 0 /100 WBC (ref 0–0.2)
PLATELET # BLD AUTO: 225 10*3/MM3 (ref 140–450)
PMV BLD AUTO: 10.9 FL (ref 6–12)
POTASSIUM BLD-SCNC: 4.6 MMOL/L (ref 3.5–5.2)
PROT SERPL-MCNC: 6.2 G/DL (ref 6–8.5)
PROTHROMBIN TIME: 20 SECONDS (ref 11.7–14.2)
RBC # BLD AUTO: 3.91 10*6/MM3 (ref 4.14–5.8)
SODIUM BLD-SCNC: 137 MMOL/L (ref 136–145)
TROPONIN T SERPL-MCNC: 0.04 NG/ML (ref 0–0.03)
TROPONIN T SERPL-MCNC: 0.06 NG/ML (ref 0–0.03)
WBC NRBC COR # BLD: 6.54 10*3/MM3 (ref 3.4–10.8)
WHOLE BLOOD HOLD SPECIMEN: NORMAL
WHOLE BLOOD HOLD SPECIMEN: NORMAL

## 2019-04-23 PROCEDURE — 71045 X-RAY EXAM CHEST 1 VIEW: CPT

## 2019-04-23 PROCEDURE — 93005 ELECTROCARDIOGRAM TRACING: CPT

## 2019-04-23 PROCEDURE — 82962 GLUCOSE BLOOD TEST: CPT

## 2019-04-23 PROCEDURE — 63710000001 INSULIN GLARGINE PER 5 UNITS: Performed by: INTERNAL MEDICINE

## 2019-04-23 PROCEDURE — G0378 HOSPITAL OBSERVATION PER HR: HCPCS

## 2019-04-23 PROCEDURE — 93010 ELECTROCARDIOGRAM REPORT: CPT | Performed by: INTERNAL MEDICINE

## 2019-04-23 PROCEDURE — 85025 COMPLETE CBC W/AUTO DIFF WBC: CPT | Performed by: EMERGENCY MEDICINE

## 2019-04-23 PROCEDURE — 83735 ASSAY OF MAGNESIUM: CPT | Performed by: EMERGENCY MEDICINE

## 2019-04-23 PROCEDURE — 99285 EMERGENCY DEPT VISIT HI MDM: CPT

## 2019-04-23 PROCEDURE — 84484 ASSAY OF TROPONIN QUANT: CPT | Performed by: INTERNAL MEDICINE

## 2019-04-23 PROCEDURE — 80053 COMPREHEN METABOLIC PANEL: CPT | Performed by: EMERGENCY MEDICINE

## 2019-04-23 PROCEDURE — 93005 ELECTROCARDIOGRAM TRACING: CPT | Performed by: EMERGENCY MEDICINE

## 2019-04-23 PROCEDURE — 85610 PROTHROMBIN TIME: CPT | Performed by: EMERGENCY MEDICINE

## 2019-04-23 PROCEDURE — 84484 ASSAY OF TROPONIN QUANT: CPT | Performed by: EMERGENCY MEDICINE

## 2019-04-23 RX ORDER — POTASSIUM CHLORIDE 1.5 G/1.77G
20 POWDER, FOR SOLUTION ORAL DAILY
Status: DISCONTINUED | OUTPATIENT
Start: 2019-04-24 | End: 2019-04-26 | Stop reason: HOSPADM

## 2019-04-23 RX ORDER — AMLODIPINE BESYLATE 5 MG/1
5 TABLET ORAL EVERY MORNING
COMMUNITY
End: 2019-04-26 | Stop reason: HOSPADM

## 2019-04-23 RX ORDER — LEVOTHYROXINE SODIUM 0.07 MG/1
75 TABLET ORAL
Status: DISCONTINUED | OUTPATIENT
Start: 2019-04-24 | End: 2019-04-26 | Stop reason: HOSPADM

## 2019-04-23 RX ORDER — SODIUM CHLORIDE 0.9 % (FLUSH) 0.9 %
10 SYRINGE (ML) INJECTION AS NEEDED
Status: DISCONTINUED | OUTPATIENT
Start: 2019-04-23 | End: 2019-04-26 | Stop reason: HOSPADM

## 2019-04-23 RX ORDER — ASPIRIN 81 MG/1
81 TABLET ORAL EVERY MORNING
COMMUNITY
End: 2020-07-22

## 2019-04-23 RX ORDER — WARFARIN SODIUM 2 MG/1
6 TABLET ORAL DAILY
COMMUNITY
End: 2019-08-30 | Stop reason: SDUPTHER

## 2019-04-23 RX ORDER — TAMSULOSIN HYDROCHLORIDE 0.4 MG/1
0.4 CAPSULE ORAL NIGHTLY
Status: DISCONTINUED | OUTPATIENT
Start: 2019-04-23 | End: 2019-04-26 | Stop reason: HOSPADM

## 2019-04-23 RX ORDER — AMLODIPINE BESYLATE 5 MG/1
5 TABLET ORAL EVERY MORNING
Status: DISCONTINUED | OUTPATIENT
Start: 2019-04-24 | End: 2019-04-25

## 2019-04-23 RX ORDER — ASPIRIN 81 MG/1
81 TABLET ORAL EVERY MORNING
Status: DISCONTINUED | OUTPATIENT
Start: 2019-04-24 | End: 2019-04-26 | Stop reason: HOSPADM

## 2019-04-23 RX ORDER — INSULIN GLARGINE 100 [IU]/ML
14 INJECTION, SOLUTION SUBCUTANEOUS NIGHTLY
Status: DISCONTINUED | OUTPATIENT
Start: 2019-04-23 | End: 2019-04-26 | Stop reason: HOSPADM

## 2019-04-23 RX ORDER — ATORVASTATIN CALCIUM 40 MG/1
40 TABLET, FILM COATED ORAL NIGHTLY
COMMUNITY
End: 2021-05-24 | Stop reason: HOSPADM

## 2019-04-23 RX ORDER — ATORVASTATIN CALCIUM 20 MG/1
40 TABLET, FILM COATED ORAL NIGHTLY
Status: DISCONTINUED | OUTPATIENT
Start: 2019-04-23 | End: 2019-04-26 | Stop reason: HOSPADM

## 2019-04-23 RX ORDER — BUPROPION HYDROCHLORIDE 150 MG/1
150 TABLET ORAL DAILY
Status: DISCONTINUED | OUTPATIENT
Start: 2019-04-24 | End: 2019-04-26 | Stop reason: HOSPADM

## 2019-04-23 RX ORDER — DOCUSATE SODIUM 100 MG/1
100 CAPSULE, LIQUID FILLED ORAL AS NEEDED
Status: DISCONTINUED | OUTPATIENT
Start: 2019-04-23 | End: 2019-04-26 | Stop reason: HOSPADM

## 2019-04-23 RX ORDER — LOSARTAN POTASSIUM 100 MG/1
100 TABLET ORAL DAILY
Status: DISCONTINUED | OUTPATIENT
Start: 2019-04-24 | End: 2019-04-25

## 2019-04-23 RX ORDER — NITROGLYCERIN 0.4 MG/1
0.4 TABLET SUBLINGUAL
Status: DISCONTINUED | OUTPATIENT
Start: 2019-04-23 | End: 2019-04-26 | Stop reason: HOSPADM

## 2019-04-23 RX ADMIN — TAMSULOSIN HYDROCHLORIDE 0.4 MG: 0.4 CAPSULE ORAL at 23:20

## 2019-04-23 RX ADMIN — INSULIN GLARGINE 14 UNITS: 100 INJECTION, SOLUTION SUBCUTANEOUS at 22:23

## 2019-04-23 RX ADMIN — NITROGLYCERIN 1 INCH: 20 OINTMENT TOPICAL at 23:20

## 2019-04-23 RX ADMIN — NITROGLYCERIN 1 INCH: 20 OINTMENT TOPICAL at 16:58

## 2019-04-23 RX ADMIN — ATORVASTATIN CALCIUM 40 MG: 20 TABLET, FILM COATED ORAL at 22:23

## 2019-04-23 RX ADMIN — NITROGLYCERIN 0.4 MG: 0.4 TABLET SUBLINGUAL at 16:06

## 2019-04-24 ENCOUNTER — APPOINTMENT (OUTPATIENT)
Dept: NUCLEAR MEDICINE | Facility: HOSPITAL | Age: 64
End: 2019-04-24

## 2019-04-24 LAB
ANION GAP SERPL CALCULATED.3IONS-SCNC: 9.6 MMOL/L
BH CV STRESS COMMENTS STAGE 1: NORMAL
BH CV STRESS DOSE REGADENOSON STAGE 1: 0.4
BH CV STRESS DURATION MIN STAGE 1: 0
BH CV STRESS DURATION SEC STAGE 1: 10
BH CV STRESS PROTOCOL 1: NORMAL
BH CV STRESS RECOVERY BP: NORMAL MMHG
BH CV STRESS RECOVERY HR: 76 BPM
BH CV STRESS STAGE 1: 1
BUN BLD-MCNC: 31 MG/DL (ref 8–23)
BUN/CREAT SERPL: 16.3 (ref 7–25)
CALCIUM SPEC-SCNC: 8.4 MG/DL (ref 8.6–10.5)
CHLORIDE SERPL-SCNC: 104 MMOL/L (ref 98–107)
CO2 SERPL-SCNC: 25.4 MMOL/L (ref 22–29)
CREAT BLD-MCNC: 1.9 MG/DL (ref 0.76–1.27)
GFR SERPL CREATININE-BSD FRML MDRD: 36 ML/MIN/1.73
GLUCOSE BLD-MCNC: 85 MG/DL (ref 65–99)
GLUCOSE BLDC GLUCOMTR-MCNC: 194 MG/DL (ref 70–130)
GLUCOSE BLDC GLUCOMTR-MCNC: 69 MG/DL (ref 70–130)
GLUCOSE BLDC GLUCOMTR-MCNC: 78 MG/DL (ref 70–130)
LV EF NUC BP: 44 %
MAXIMAL PREDICTED HEART RATE: 157 BPM
PERCENT MAX PREDICTED HR: 57.96 %
POTASSIUM BLD-SCNC: 4.4 MMOL/L (ref 3.5–5.2)
SODIUM BLD-SCNC: 139 MMOL/L (ref 136–145)
STRESS BASELINE BP: NORMAL MMHG
STRESS BASELINE HR: 69 BPM
STRESS PERCENT HR: 68 %
STRESS POST PEAK BP: NORMAL MMHG
STRESS POST PEAK HR: 91 BPM
STRESS TARGET HR: 133 BPM
TROPONIN T SERPL-MCNC: 0.04 NG/ML (ref 0–0.03)

## 2019-04-24 PROCEDURE — 80048 BASIC METABOLIC PNL TOTAL CA: CPT | Performed by: NURSE PRACTITIONER

## 2019-04-24 PROCEDURE — G0378 HOSPITAL OBSERVATION PER HR: HCPCS

## 2019-04-24 PROCEDURE — 63710000001 INSULIN GLARGINE PER 5 UNITS: Performed by: INTERNAL MEDICINE

## 2019-04-24 PROCEDURE — A9500 TC99M SESTAMIBI: HCPCS | Performed by: INTERNAL MEDICINE

## 2019-04-24 PROCEDURE — 93016 CV STRESS TEST SUPVJ ONLY: CPT | Performed by: INTERNAL MEDICINE

## 2019-04-24 PROCEDURE — 99220 PR INITIAL OBSERVATION CARE/DAY 70 MINUTES: CPT | Performed by: INTERNAL MEDICINE

## 2019-04-24 PROCEDURE — 82962 GLUCOSE BLOOD TEST: CPT

## 2019-04-24 PROCEDURE — 63710000001 INSULIN LISPRO (HUMAN) PER 5 UNITS: Performed by: INTERNAL MEDICINE

## 2019-04-24 PROCEDURE — 93005 ELECTROCARDIOGRAM TRACING: CPT | Performed by: NURSE PRACTITIONER

## 2019-04-24 PROCEDURE — 84484 ASSAY OF TROPONIN QUANT: CPT | Performed by: INTERNAL MEDICINE

## 2019-04-24 PROCEDURE — 25010000002 REGADENOSON 0.4 MG/5ML SOLUTION: Performed by: INTERNAL MEDICINE

## 2019-04-24 PROCEDURE — 78452 HT MUSCLE IMAGE SPECT MULT: CPT

## 2019-04-24 PROCEDURE — 93017 CV STRESS TEST TRACING ONLY: CPT

## 2019-04-24 PROCEDURE — 93010 ELECTROCARDIOGRAM REPORT: CPT | Performed by: INTERNAL MEDICINE

## 2019-04-24 PROCEDURE — 0 TECHNETIUM SESTAMIBI: Performed by: INTERNAL MEDICINE

## 2019-04-24 PROCEDURE — 93018 CV STRESS TEST I&R ONLY: CPT | Performed by: INTERNAL MEDICINE

## 2019-04-24 PROCEDURE — 78452 HT MUSCLE IMAGE SPECT MULT: CPT | Performed by: INTERNAL MEDICINE

## 2019-04-24 RX ORDER — SODIUM CHLORIDE 0.9 % (FLUSH) 0.9 %
3 SYRINGE (ML) INJECTION EVERY 12 HOURS SCHEDULED
Status: DISCONTINUED | OUTPATIENT
Start: 2019-04-24 | End: 2019-04-26 | Stop reason: HOSPADM

## 2019-04-24 RX ORDER — SODIUM CHLORIDE 0.9 % (FLUSH) 0.9 %
3-10 SYRINGE (ML) INJECTION AS NEEDED
Status: DISCONTINUED | OUTPATIENT
Start: 2019-04-24 | End: 2019-04-26 | Stop reason: HOSPADM

## 2019-04-24 RX ORDER — ACETAMINOPHEN 325 MG/1
650 TABLET ORAL EVERY 4 HOURS PRN
Status: DISCONTINUED | OUTPATIENT
Start: 2019-04-24 | End: 2019-04-26 | Stop reason: HOSPADM

## 2019-04-24 RX ORDER — METOPROLOL SUCCINATE 25 MG/1
25 TABLET, EXTENDED RELEASE ORAL
Status: DISCONTINUED | OUTPATIENT
Start: 2019-04-24 | End: 2019-04-26 | Stop reason: HOSPADM

## 2019-04-24 RX ADMIN — NITROGLYCERIN 1 INCH: 20 OINTMENT TOPICAL at 05:47

## 2019-04-24 RX ADMIN — LOSARTAN POTASSIUM 100 MG: 100 TABLET, FILM COATED ORAL at 13:30

## 2019-04-24 RX ADMIN — TAMSULOSIN HYDROCHLORIDE 0.4 MG: 0.4 CAPSULE ORAL at 20:10

## 2019-04-24 RX ADMIN — ATORVASTATIN CALCIUM 40 MG: 20 TABLET, FILM COATED ORAL at 20:10

## 2019-04-24 RX ADMIN — LEVOTHYROXINE SODIUM 75 MCG: 75 TABLET ORAL at 05:47

## 2019-04-24 RX ADMIN — INSULIN LISPRO 2 UNITS: 100 INJECTION, SOLUTION INTRAVENOUS; SUBCUTANEOUS at 17:38

## 2019-04-24 RX ADMIN — NITROGLYCERIN 1 INCH: 20 OINTMENT TOPICAL at 13:32

## 2019-04-24 RX ADMIN — BUPROPION HYDROCHLORIDE 150 MG: 150 TABLET, FILM COATED, EXTENDED RELEASE ORAL at 13:29

## 2019-04-24 RX ADMIN — REGADENOSON 0.4 MG: 0.08 INJECTION, SOLUTION INTRAVENOUS at 11:40

## 2019-04-24 RX ADMIN — TECHNETIUM TC 99M SESTAMIBI 1 DOSE: 1 INJECTION INTRAVENOUS at 11:40

## 2019-04-24 RX ADMIN — TECHNETIUM TC 99M SESTAMIBI 1 DOSE: 1 INJECTION INTRAVENOUS at 09:35

## 2019-04-24 RX ADMIN — NITROGLYCERIN 1 INCH: 20 OINTMENT TOPICAL at 17:38

## 2019-04-24 RX ADMIN — INSULIN GLARGINE 14 UNITS: 100 INJECTION, SOLUTION SUBCUTANEOUS at 22:36

## 2019-04-24 RX ADMIN — SODIUM CHLORIDE, PRESERVATIVE FREE 3 ML: 5 INJECTION INTRAVENOUS at 22:37

## 2019-04-24 RX ADMIN — AMLODIPINE BESYLATE 5 MG: 5 TABLET ORAL at 13:30

## 2019-04-24 RX ADMIN — METOPROLOL SUCCINATE 25 MG: 25 TABLET, FILM COATED, EXTENDED RELEASE ORAL at 17:38

## 2019-04-24 RX ADMIN — ASPIRIN 81 MG: 81 TABLET, DELAYED RELEASE ORAL at 13:29

## 2019-04-24 RX ADMIN — DOCUSATE SODIUM 100 MG: 100 CAPSULE, LIQUID FILLED ORAL at 20:11

## 2019-04-25 ENCOUNTER — APPOINTMENT (OUTPATIENT)
Dept: SLEEP MEDICINE | Facility: HOSPITAL | Age: 64
End: 2019-04-25

## 2019-04-25 LAB
ANION GAP SERPL CALCULATED.3IONS-SCNC: 7.7 MMOL/L
BUN BLD-MCNC: 29 MG/DL (ref 8–23)
BUN/CREAT SERPL: 14.6 (ref 7–25)
CALCIUM SPEC-SCNC: 8 MG/DL (ref 8.6–10.5)
CHLORIDE SERPL-SCNC: 107 MMOL/L (ref 98–107)
CO2 SERPL-SCNC: 25.3 MMOL/L (ref 22–29)
CREAT BLD-MCNC: 1.98 MG/DL (ref 0.76–1.27)
GFR SERPL CREATININE-BSD FRML MDRD: 34 ML/MIN/1.73
GLUCOSE BLD-MCNC: 123 MG/DL (ref 65–99)
GLUCOSE BLDC GLUCOMTR-MCNC: 109 MG/DL (ref 70–130)
GLUCOSE BLDC GLUCOMTR-MCNC: 128 MG/DL (ref 70–130)
GLUCOSE BLDC GLUCOMTR-MCNC: 167 MG/DL (ref 70–130)
POTASSIUM BLD-SCNC: 4.4 MMOL/L (ref 3.5–5.2)
SODIUM BLD-SCNC: 140 MMOL/L (ref 136–145)

## 2019-04-25 PROCEDURE — 82962 GLUCOSE BLOOD TEST: CPT

## 2019-04-25 PROCEDURE — G0378 HOSPITAL OBSERVATION PER HR: HCPCS

## 2019-04-25 PROCEDURE — 63710000001 INSULIN GLARGINE PER 5 UNITS: Performed by: INTERNAL MEDICINE

## 2019-04-25 PROCEDURE — 99217 PR OBSERVATION CARE DISCHARGE MANAGEMENT: CPT | Performed by: NURSE PRACTITIONER

## 2019-04-25 PROCEDURE — 80048 BASIC METABOLIC PNL TOTAL CA: CPT | Performed by: INTERNAL MEDICINE

## 2019-04-25 RX ORDER — LOSARTAN POTASSIUM 50 MG/1
50 TABLET ORAL ONCE
Status: COMPLETED | OUTPATIENT
Start: 2019-04-25 | End: 2019-04-25

## 2019-04-25 RX ORDER — LOSARTAN POTASSIUM 50 MG/1
100 TABLET ORAL DAILY
Qty: 30 TABLET | Refills: 11 | Status: SHIPPED | OUTPATIENT
Start: 2019-04-25 | End: 2019-04-26 | Stop reason: SDUPTHER

## 2019-04-25 RX ORDER — METOPROLOL SUCCINATE 25 MG/1
25 TABLET, EXTENDED RELEASE ORAL
Qty: 30 TABLET | Refills: 11 | Status: SHIPPED | OUTPATIENT
Start: 2019-04-26 | End: 2020-06-04 | Stop reason: HOSPADM

## 2019-04-25 RX ORDER — METOPROLOL SUCCINATE 25 MG/1
25 TABLET, EXTENDED RELEASE ORAL ONCE
Status: COMPLETED | OUTPATIENT
Start: 2019-04-25 | End: 2019-04-25

## 2019-04-25 RX ORDER — FUROSEMIDE 40 MG/1
20 TABLET ORAL DAILY
Status: ON HOLD
Start: 2019-04-25 | End: 2019-07-06 | Stop reason: SDUPTHER

## 2019-04-25 RX ORDER — LOSARTAN POTASSIUM 50 MG/1
50 TABLET ORAL DAILY
Status: DISCONTINUED | OUTPATIENT
Start: 2019-04-26 | End: 2019-04-26 | Stop reason: HOSPADM

## 2019-04-25 RX ORDER — POTASSIUM CHLORIDE 750 MG/1
10 TABLET, FILM COATED, EXTENDED RELEASE ORAL DAILY
Start: 2019-04-25 | End: 2019-07-02 | Stop reason: ALTCHOICE

## 2019-04-25 RX ORDER — WARFARIN SODIUM 6 MG/1
6 TABLET ORAL DAILY
Status: DISCONTINUED | OUTPATIENT
Start: 2019-04-25 | End: 2019-04-26 | Stop reason: HOSPADM

## 2019-04-25 RX ADMIN — METOPROLOL SUCCINATE 25 MG: 25 TABLET, FILM COATED, EXTENDED RELEASE ORAL at 14:22

## 2019-04-25 RX ADMIN — SODIUM CHLORIDE, PRESERVATIVE FREE 3 ML: 5 INJECTION INTRAVENOUS at 09:57

## 2019-04-25 RX ADMIN — SODIUM CHLORIDE, PRESERVATIVE FREE 3 ML: 5 INJECTION INTRAVENOUS at 21:15

## 2019-04-25 RX ADMIN — AMLODIPINE BESYLATE 5 MG: 5 TABLET ORAL at 07:11

## 2019-04-25 RX ADMIN — WARFARIN SODIUM 6 MG: 6 TABLET ORAL at 10:54

## 2019-04-25 RX ADMIN — INSULIN GLARGINE 14 UNITS: 100 INJECTION, SOLUTION SUBCUTANEOUS at 21:15

## 2019-04-25 RX ADMIN — BUPROPION HYDROCHLORIDE 150 MG: 150 TABLET, FILM COATED, EXTENDED RELEASE ORAL at 09:57

## 2019-04-25 RX ADMIN — POTASSIUM CHLORIDE 20 MEQ: 1.5 POWDER, FOR SOLUTION ORAL at 09:57

## 2019-04-25 RX ADMIN — ASPIRIN 81 MG: 81 TABLET, DELAYED RELEASE ORAL at 07:09

## 2019-04-25 RX ADMIN — LOSARTAN POTASSIUM 50 MG: 100 TABLET, FILM COATED ORAL at 14:22

## 2019-04-25 RX ADMIN — TAMSULOSIN HYDROCHLORIDE 0.4 MG: 0.4 CAPSULE ORAL at 21:15

## 2019-04-25 RX ADMIN — ATORVASTATIN CALCIUM 40 MG: 20 TABLET, FILM COATED ORAL at 21:14

## 2019-04-25 RX ADMIN — LEVOTHYROXINE SODIUM 75 MCG: 75 TABLET ORAL at 07:11

## 2019-04-26 VITALS
BODY MASS INDEX: 25.44 KG/M2 | DIASTOLIC BLOOD PRESSURE: 70 MMHG | SYSTOLIC BLOOD PRESSURE: 118 MMHG | RESPIRATION RATE: 16 BRPM | HEART RATE: 71 BPM | TEMPERATURE: 97.5 F | OXYGEN SATURATION: 96 % | WEIGHT: 187.8 LBS | HEIGHT: 72 IN

## 2019-04-26 LAB
ANION GAP SERPL CALCULATED.3IONS-SCNC: 8.1 MMOL/L
BUN BLD-MCNC: 35 MG/DL (ref 8–23)
BUN/CREAT SERPL: 16.4 (ref 7–25)
CALCIUM SPEC-SCNC: 8.6 MG/DL (ref 8.6–10.5)
CHLORIDE SERPL-SCNC: 108 MMOL/L (ref 98–107)
CO2 SERPL-SCNC: 24.9 MMOL/L (ref 22–29)
CREAT BLD-MCNC: 2.14 MG/DL (ref 0.76–1.27)
GFR SERPL CREATININE-BSD FRML MDRD: 31 ML/MIN/1.73
GLUCOSE BLD-MCNC: 70 MG/DL (ref 65–99)
GLUCOSE BLDC GLUCOMTR-MCNC: 66 MG/DL (ref 70–130)
GLUCOSE BLDC GLUCOMTR-MCNC: 69 MG/DL (ref 70–130)
INR PPP: 1.73 (ref 0.9–1.1)
POTASSIUM BLD-SCNC: 4.6 MMOL/L (ref 3.5–5.2)
PROTHROMBIN TIME: 19.9 SECONDS (ref 11.7–14.2)
SODIUM BLD-SCNC: 141 MMOL/L (ref 136–145)

## 2019-04-26 PROCEDURE — 63710000001 INSULIN LISPRO (HUMAN) PER 5 UNITS: Performed by: INTERNAL MEDICINE

## 2019-04-26 PROCEDURE — 85610 PROTHROMBIN TIME: CPT | Performed by: NURSE PRACTITIONER

## 2019-04-26 PROCEDURE — G0378 HOSPITAL OBSERVATION PER HR: HCPCS

## 2019-04-26 PROCEDURE — 99213 OFFICE O/P EST LOW 20 MIN: CPT | Performed by: NURSE PRACTITIONER

## 2019-04-26 PROCEDURE — 80048 BASIC METABOLIC PNL TOTAL CA: CPT | Performed by: NURSE PRACTITIONER

## 2019-04-26 PROCEDURE — 82962 GLUCOSE BLOOD TEST: CPT

## 2019-04-26 RX ORDER — LOSARTAN POTASSIUM 50 MG/1
50 TABLET ORAL DAILY
Qty: 30 TABLET | Refills: 11 | Status: SHIPPED | OUTPATIENT
Start: 2019-04-26 | End: 2019-11-13

## 2019-04-26 RX ADMIN — SODIUM CHLORIDE, PRESERVATIVE FREE 3 ML: 5 INJECTION INTRAVENOUS at 08:28

## 2019-04-26 RX ADMIN — WARFARIN SODIUM 6 MG: 6 TABLET ORAL at 08:33

## 2019-04-26 RX ADMIN — BUPROPION HYDROCHLORIDE 150 MG: 150 TABLET, FILM COATED, EXTENDED RELEASE ORAL at 08:33

## 2019-04-26 RX ADMIN — LOSARTAN POTASSIUM 50 MG: 50 TABLET, FILM COATED ORAL at 08:33

## 2019-04-26 RX ADMIN — LEVOTHYROXINE SODIUM 75 MCG: 75 TABLET ORAL at 06:16

## 2019-04-26 RX ADMIN — POTASSIUM CHLORIDE 20 MEQ: 1.5 POWDER, FOR SOLUTION ORAL at 08:33

## 2019-04-26 RX ADMIN — METOPROLOL SUCCINATE 25 MG: 25 TABLET, FILM COATED, EXTENDED RELEASE ORAL at 08:33

## 2019-04-26 RX ADMIN — ASPIRIN 81 MG: 81 TABLET, DELAYED RELEASE ORAL at 06:16

## 2019-04-26 RX ADMIN — INSULIN LISPRO 2 UNITS: 100 INJECTION, SOLUTION INTRAVENOUS; SUBCUTANEOUS at 08:33

## 2019-04-27 ENCOUNTER — READMISSION MANAGEMENT (OUTPATIENT)
Dept: CALL CENTER | Facility: HOSPITAL | Age: 64
End: 2019-04-27

## 2019-04-28 NOTE — OUTREACH NOTE
Prep Survey      Responses   Facility patient discharged from?  [place]   Is patient eligible?  Yes   Discharge diagnosis  Coronary artery disease   Does the patient have one of the following disease processes/diagnoses(primary or secondary)?  Other   Does the patient have Home health ordered?  Yes   What is the Home health agency?   [facility]   Is there a DME ordered?  No   Prep survey completed?  Yes          Nadira Perdue RN

## 2019-04-29 ENCOUNTER — READMISSION MANAGEMENT (OUTPATIENT)
Dept: CALL CENTER | Facility: HOSPITAL | Age: 64
End: 2019-04-29

## 2019-04-29 NOTE — OUTREACH NOTE
Medical Week 1 Survey      Responses   Facility patient discharged from?  [place]   Does the patient have one of the following disease processes/diagnoses(primary or secondary)?  Other   Is there a successful TCM telephone encounter documented?  No   Week 1 attempt successful?  Yes   Call start time  1836   Call end time  1840   Discharge diagnosis  Coronary artery disease   Is patient permission given to speak with other caregiver?  Yes   Meds reviewed with patient/caregiver?  Yes   Is the patient having any side effects they believe may be caused by any medication additions or changes?  No   Does the patient have all medications ordered at discharge?  Yes   Is the patient taking all medications as directed (includes completed medication regime)?  Yes   Does the patient have a primary care provider?   Yes   Does the patient have an appointment with their PCP within 7 days of discharge?  Yes   Has the patient kept scheduled appointments due by today?  N/A   What is the Home health agency?   [facility]   Has home health visited the patient within 72 hours of discharge?  N/A   Psychosocial issues?  No   Did the patient receive a copy of their discharge instructions?  Yes   Nursing interventions  Reviewed instructions with patient   What is the patient's perception of their health status since discharge?  Improving   Is the patient/caregiver able to teach back signs and symptoms related to disease process for when to call PCP?  Yes   Is the patient/caregiver able to teach back signs and symptoms related to disease process for when to call 911?  Yes   Is the patient/caregiver able to teach back the hierarchy of who to call/visit for symptoms/problems? PCP, Specialist, Home health nurse, Urgent Care, ED, 911  Yes   Additional teach back comments  He says he is doing.  Will  batteries to begin daily weights.  Has appts this week.   Week 1 call completed?  Yes          Cara Swann RN

## 2019-05-02 ENCOUNTER — ANTICOAGULATION VISIT (OUTPATIENT)
Dept: PHARMACY | Facility: HOSPITAL | Age: 64
End: 2019-05-02

## 2019-05-02 ENCOUNTER — TELEPHONE (OUTPATIENT)
Dept: CARDIOLOGY | Facility: CLINIC | Age: 64
End: 2019-05-02

## 2019-05-02 ENCOUNTER — OFFICE VISIT (OUTPATIENT)
Dept: CARDIOLOGY | Facility: CLINIC | Age: 64
End: 2019-05-02

## 2019-05-02 VITALS
HEIGHT: 73 IN | DIASTOLIC BLOOD PRESSURE: 70 MMHG | SYSTOLIC BLOOD PRESSURE: 122 MMHG | HEART RATE: 62 BPM | WEIGHT: 197.6 LBS | BODY MASS INDEX: 26.19 KG/M2

## 2019-05-02 DIAGNOSIS — G47.33 OSA (OBSTRUCTIVE SLEEP APNEA): ICD-10-CM

## 2019-05-02 DIAGNOSIS — N28.9 RENAL INSUFFICIENCY: ICD-10-CM

## 2019-05-02 DIAGNOSIS — I10 ESSENTIAL HYPERTENSION: ICD-10-CM

## 2019-05-02 DIAGNOSIS — I63.10 CEREBROVASCULAR ACCIDENT (CVA) DUE TO EMBOLISM OF PRECEREBRAL ARTERY (HCC): ICD-10-CM

## 2019-05-02 DIAGNOSIS — I50.9 CHRONIC CONGESTIVE HEART FAILURE, UNSPECIFIED HEART FAILURE TYPE (HCC): ICD-10-CM

## 2019-05-02 DIAGNOSIS — I25.10 CORONARY ARTERY DISEASE INVOLVING NATIVE CORONARY ARTERY OF NATIVE HEART WITHOUT ANGINA PECTORIS: Primary | ICD-10-CM

## 2019-05-02 LAB
INR PPP: 1.7 (ref 0.91–1.09)
PROTHROMBIN TIME: 20.6 SECONDS (ref 10–13.8)

## 2019-05-02 PROCEDURE — 36416 COLLJ CAPILLARY BLOOD SPEC: CPT

## 2019-05-02 PROCEDURE — 85610 PROTHROMBIN TIME: CPT

## 2019-05-02 PROCEDURE — G0463 HOSPITAL OUTPT CLINIC VISIT: HCPCS

## 2019-05-02 PROCEDURE — 99214 OFFICE O/P EST MOD 30 MIN: CPT | Performed by: NURSE PRACTITIONER

## 2019-05-02 PROCEDURE — 93000 ELECTROCARDIOGRAM COMPLETE: CPT | Performed by: NURSE PRACTITIONER

## 2019-05-02 NOTE — TELEPHONE ENCOUNTER
5/2/19  I spoke with patient - he had his inr ckd today and is to have the HM next week - he will have labs drawn then/shine

## 2019-05-02 NOTE — PROGRESS NOTES
Anticoagulation Clinic Progress Note    Anticoagulation Summary  As of 2019    INR goal:   2.0-3.0   TTR:   48.5 % (3.1 mo)   INR used for dosin.7! (2019)   Warfarin maintenance plan:   9 mg every Thu; 6 mg all other days   Weekly warfarin total:   45 mg   Plan last modified:   Hong Burgess RPH (2019)   Next INR check:   2019   Priority:   High   Target end date:       Indications    Cerebrovascular accident (CVA) due to embolism of precerebral artery (CMS/HCC) [I63.10]             Anticoagulation Episode Summary     INR check location:       Preferred lab:       Send INR reminders to:    [facility]    Comments:         Anticoagulation Care Providers     Provider Role Specialty Phone number    Amber Murguia MD Referring Cardiology [phone]          Clinic Interview:  Patient Findings     Positives:   Change in medications, Hospital admission    Negatives:   Signs/symptoms of thrombosis, Signs/symptoms of bleeding,   Laboratory test error suspected, Change in health, Change in alcohol use,   Change in activity, Upcoming invasive procedure, Emergency department   visit, Upcoming dental procedure, Missed doses, Extra doses, Change in   diet/appetite, Bruising, Other complaints    Comments:   Hospitalized for chest pain. Metoprolol added to home meds.      Clinical Outcomes     Negatives:   Major bleeding event, Thromboembolic event,   Anticoagulation-related hospital admission, Anticoagulation-related ED   visit, Anticoagulation-related fatality    Comments:   Hospitalized for chest pain. Metoprolol added to home meds.        INR History:  Anticoagulation Monitoring 3/12/2019 3/26/2019 2019   INR 2.5 2.5 1.7   INR Date 3/12/2019 3/26/2019 2019   INR Goal 2.0-3.0 2.0-3.0 2.0-3.0   Trend Same Same Up   Last Week Total 44 mg 42 mg 42 mg   Next Week Total 42 mg 42 mg 45 mg   Sun 6 mg 6 mg 6 mg   Mon 6 mg 6 mg 6 mg   Tue 6 mg 6 mg 6 mg   Wed 6 mg 6 mg 6 mg   Thu 6  mg 6 mg 9 mg   Fri 6 mg 6 mg 6 mg   Sat 6 mg 6 mg 6 mg   Visit Report - - -   Some recent data might be hidden       Plan:  1. INR is Subtherapeutic today- see above in Anticoagulation Summary.  Will instruct Carlito MEHTA Chepe to Increase their warfarin regimen- see above in Anticoagulation Summary.  2. Follow up in 2 weeks  3. Patient declines warfarin refills.  4. Verbal and written information provided. Patient expresses understanding and has no further questions at this time.    Hong Burgess RP

## 2019-05-02 NOTE — PROGRESS NOTES
Date of Office Visit: 2019  Encounter Provider: Nadine Blake, JULIO C, APRN  Place of Service: [facility] CARDIOLOGY  Patient Name: Carlito Mo  :1955        Subjective:     Chief Complaint:  Follow-up, coronary artery disease, cardiomyopathy, congestive heart failure, hospital follow-up.      History of Present Illness:  Carlito Mo is a 63 y.o. male patient of Dr. Murguia.  I am seeing this patient in the office today and I reviewed his records.    Patient has a history of coronary artery disease, cardiomyopathy, congestive heart failure, respiratory failure, chronic kidney disease, hypothyroidism, type 2 diabetes, history of stroke while on dual antiplatelet therapy now on warfarin.     Patient was first seen by Dr. Murguia 2017 when he was admitted with altered mental status.  He was diagnosed with multiple small acute infarcts.  Echocardiogram 2017 showed mild left ventricular hypertrophy, mild left atrial enlargement, negative bubble study, normal LV systolic function with an ejection fraction of 60%.  Transesophageal echo 2017 showed that his LV function was mildly low with an ejection fraction of 45% with focal wall motion abnormalities primarily in the septum and anterior wall and apex.  Small patent foramen ovale was noted and no significant valvular heart disease or cardiac source of emboli minus a small PFO were seen.  Holter monitor showed a couple of short bursts of SVT but no sustained arrhythmia.  He was seen back in the hospital 2017 in May 2018 with altered mental status, as well as heart failure exacerbations.  Last echocardiogram 2018 showed mildly decreased left ventricular systolic function with EF of 43%.  Severe hypokinesis of the distal anterior septum and anterior apex noted.  Mild mitral valve regurgitation was present, left atrial cavity size was dilated, right ventricular cavity was mildly dilated, with  trivial pericardial effusion.    Patient presented to the ER 4/23/2019 complaining of intermittent chest discomfort overnight.  Troponin was elevated 0.045 and 0.044.  Troponins have been elevated in this range prior admissions as well.  He had nitroglycerin paste which helped with the pain.  Prior to this chest pain episode he had not had any other episodes of chest pain.  Nuclear stress test was ordered which showed medium sized infarct in the apex without significant ischemia.  Beta-blockade was restarted.  Cardiac catheterization will be considered of chest pain persisted.  He was discharged 4/26/2019.      Patient presents to office today for follow-up appointment.  Patient reports he still has been under a lot of stress since he got out of the hospital.  His mother-in-law is now in the hospital.  He continues to have some shortness of breath with activities, which she reports is chronic and not new or worsening.  He continues to have palpitations a couple times a week that last a couple of seconds.  He has not gotten his Holter monitor done yet and will have him schedule this on his way out today.  He feels like his lower extremity swelling is about at baseline.  He still has the occasional dizziness when he is very tired or has not gotten enough sleep.  Chronic fatigue which she attributes to not enough sleep and stress.  He denies any recurrent chest pain since he has been out of the hospital.  Denies syncope, near syncope, falls, or abnormal bleeding.  He has been using his CPAP machine every night.  Blood pressure and heart rate are well controlled in the office today.  Unfortunately he has a [employer] fish box with him in the office today.  I discussed with him that there is a lot of salt in fast foods and fried foods and that that is something that could worsen his heart failure and land him back in the hospital.  He verbalized understanding.  He is going to continue to monitor daily weights and  call if he gains 3 pounds in 1 day or 5 pounds in a week or if he has any increased shortness of breath or lower extremity swelling.  He will also call right away if he develops any recurrent chest pain episodes or go to the ER depending on severity.          Past Medical History:   Diagnosis Date   • Acquired hypothyroidism    • Acute congestive heart failure (CMS/HCC)    • Acute respiratory failure with hypoxia (CMS/HCC)    • Acute sinusitis    • SYLVAIN (acute kidney injury) (CMS/HCC)     on CKD   • Anemia    • Arthritis    • CAD (coronary artery disease)    • Cancer (CMS/HCC)     skin   • Chronic combined systolic and diastolic congestive heart failure (CMS/HCC)    • Chronic ischemic heart disease    • CKD (chronic kidney disease)    • Depression    • Diabetes mellitus type 2, uncontrolled, without complications (CMS/HCC)    • Disease of thyroid gland    • Fatigue    • Frequent PVCs    • Heart attack (CMS/HCC)    • History of coronary artery disease     with remote history of bypass surgery in 2001   • History of transfusion    • Hyperglycemia    • Hyperlipidemia    • Hypertension    • Hypertensive encephalopathy    • Mental status change     Acute   • Pleural effusion    • Pneumonia    • RBBB (right bundle branch block)    • Screening for prostate cancer 2011   • Stroke (CMS/HCC)    • TIA (transient ischemic attack)    • Type 2 diabetes mellitus (CMS/HCC)    • Urinary retention    • Vitamin D deficiency      Past Surgical History:   Procedure Laterality Date   • APPENDECTOMY N/A 02/14/2016    Dr. Alexey Dodson   • COLONOSCOPY      over 20 years ago.  no polyps    • COLONOSCOPY N/A 9/18/2018    Procedure: COLONOSCOPY;  Surgeon: Jose Enrique Louie MD;  Location: [facility] ENDOSCOPY;  Service: Gastroenterology   • COLONOSCOPY N/A 9/19/2018    IH, EH, polyps (TAs w/low grade dysplasia)   • CORONARY ARTERY BYPASS GRAFT  11/2001   • TONSILLECTOMY     • VASECTOMY       Outpatient Medications Prior to Visit   Medication  Sig Dispense Refill   • aspirin 81 MG EC tablet Take 81 mg by mouth Every Morning.     • atorvastatin (LIPITOR) 40 MG tablet Take 40 mg by mouth Every Night.     • buPROPion XL (WELLBUTRIN XL) 150 MG 24 hr tablet Take 1 tablet by mouth Daily.     • Cholecalciferol (VITAMIN D3) 5000 units capsule capsule Take 5,000 Units by mouth Daily.     • docusate sodium (COLACE) 100 MG capsule Take 1 capsule by mouth 2 (two) times a day. (Patient taking differently: Take 100 mg by mouth As Needed for Constipation.) 30 capsule 0   • furosemide (LASIX) 40 MG tablet Take 0.5 tablets by mouth Daily.     • Insulin Glargine (BASAGLAR KWIKPEN) 100 UNIT/ML injection pen Inject 14 Units under the skin into the appropriate area as directed Every Night.     • insulin glulisine (APIDRA) 100 UNIT/ML injection Inject 2 Units under the skin into the appropriate area as directed 3 (Three) Times a Day Before Meals.     • levothyroxine (SYNTHROID, LEVOTHROID) 75 MCG tablet Take 75 mcg by mouth Daily.     • losartan (COZAAR) 50 MG tablet Take 1 tablet by mouth Daily. 30 tablet 11   • metoprolol succinate XL (TOPROL-XL) 25 MG 24 hr tablet Take 1 tablet by mouth Daily. 30 tablet 11   • potassium chloride (K-DUR) 10 MEQ CR tablet Take 1 tablet by mouth Daily.     • tamsulosin (FLOMAX) 0.4 MG capsule 24 hr capsule Take 1 capsule by mouth Every Night.     • warfarin (COUMADIN) 2 MG tablet Take 6 mg by mouth Daily.       No facility-administered medications prior to visit.        Allergies as of 05/02/2019 - Reviewed 05/02/2019   Allergen Reaction Noted   • Fluoxetine Other (See Comments) 03/12/2018   • Prozac [fluoxetine hcl] Other (See Comments) 12/17/2015     Social History     Socioeconomic History   • Marital status:      Spouse name: Not on file   • Number of children: Not on file   • Years of education: Not on file   • Highest education level: Not on file   Tobacco Use   • Smoking status: Never Smoker   • Smokeless tobacco: Never Used   •  "Tobacco comment: daily caffiene   Substance and Sexual Activity   • Alcohol use: No   • Drug use: No   • Sexual activity: Defer     Family History   Problem Relation Age of Onset   • Diabetes Mother    • Hypertension Mother    • Macular degeneration Mother    • Alcohol abuse Father    • Cancer Father         lung   • Alcohol abuse Brother        Review of Systems   Constitution: Negative for chills, fever, malaise/fatigue, weight gain and weight loss.   HENT: Negative for ear pain, hearing loss, nosebleeds and sore throat.    Eyes: Negative for blurred vision, double vision, redness, vision loss in left eye, vision loss in right eye and visual disturbance.   Cardiovascular:        SEE HPI.    Respiratory: Negative for cough, shortness of breath, snoring and wheezing.    Endocrine: Negative for cold intolerance and heat intolerance.   Skin: Negative for itching, rash and suspicious lesions.   Musculoskeletal: Negative for joint pain, joint swelling and myalgias.   Gastrointestinal: Negative for abdominal pain, diarrhea, hematemesis, melena, nausea and vomiting.   Genitourinary: Negative for dysuria, frequency and hematuria.   Neurological: Negative for dizziness, headaches, numbness, paresthesias and seizures.   Psychiatric/Behavioral: Negative for altered mental status and depression. The patient is not nervous/anxious.           Objective:     Vitals:    05/02/19 1344   BP: 122/70   BP Location: Left arm   Pulse: 62   Weight: 89.6 kg (197 lb 9.6 oz)   Height: 185.4 cm (73\")     Body mass index is 26.07 kg/m².      PHYSICAL EXAM:  Physical Exam   Constitutional: He is oriented to person, place, and time. He appears well-developed and well-nourished. No distress.   HENT:   Head: Normocephalic and atraumatic.   Eyes: Pupils are equal, round, and reactive to light.   Neck: Neck supple. No JVD present. Carotid bruit is not present. No tracheal deviation present.   Cardiovascular: Normal rate, regular rhythm, normal " heart sounds and intact distal pulses. Exam reveals no gallop and no friction rub.   No murmur heard.  Pulses:       Radial pulses are 2+ on the right side, and 2+ on the left side.        Posterior tibial pulses are 2+ on the right side, and 2+ on the left side.   Pulmonary/Chest: Effort normal and breath sounds normal. No respiratory distress. He has no wheezes. He has no rales.   Abdominal: Soft. Bowel sounds are normal. He exhibits no distension. There is no tenderness.   Musculoskeletal: He exhibits edema (trace to bilateral ankles). He exhibits no tenderness or deformity.   Neurological: He is alert and oriented to person, place, and time.   Skin: Skin is warm and dry. No rash noted. He is not diaphoretic. No erythema.   Psychiatric: He has a normal mood and affect. His behavior is normal. Judgment normal.           ECG 12 Lead  Date/Time: 5/2/2019 2:05 PM  Performed by: Nadine Blake DNP, JI  Authorized by: Nadine Blake DNP, JI   Comparison: compared with previous ECG from 4/24/2019  Similar to previous ECG  Rhythm: sinus rhythm  Rate: normal  BPM: 62  Conduction: right bundle branch block  Other findings: non-specific ST-T wave changes  Other findings comments: Previous infarct, age indeterminate.  Comments: No significant changes from previous ECG.              Assessment:       Diagnosis Plan   1. Coronary artery disease involving native coronary artery of native heart without angina pectoris  ECG 12 Lead   2. Chronic congestive heart failure, unspecified heart failure type (CMS/HCC)  ECG 12 Lead    Basic Metabolic Panel   3. YAW (obstructive sleep apnea)     4. Essential hypertension  ECG 12 Lead   5. Renal insufficiency  Basic Metabolic Panel         Plan:     1. Coronary artery disease: Recent stress test that showed prior infarct but no significant ischemia.  Patient denies any recurrent chest pain since hospital discharge.  Beta-blocker has been added to medical therapy.  He will notify  the office right away if he develops any recurrent symptoms, at which point we will need to consider possible heart catheterization based on the nature of symptoms.  2. Cardiomyopathy and recurrent heart failure: Patient actually appears pretty euvolemic in the office today.  He only has trace edema on exam, which he has on a chronic basis.  His lungs are clear.  3. Palpitations and history of frequent PVCs: Patient to schedule Holter monitor on way out today.  4. Hypertension: Blood pressure well controlled in the office today.  5. History of recurrent CVA while on dapsone therapy, now on warfarin.  6. Hyperlipidemia: Managed by outside provider.  7. Diabetes: Managed by outside provider.  8. Chronic kidney disease: Managed by outside provider.  We will have patient recheck a BMP within the next couple of weeks.  9. Obstructive sleep apnea: Has been using CPAP every night.    Patient to schedule 3 month hospital follow-up appointment with Dr. Murguia or follow-up sooner if needed for any new, recurrent, or worsening symptoms or other problems/concerns.    Signs/symptoms of when to call the office for sooner appointment discussed in detail, including calling for increased shortness of breath, increased lower extremity swelling, weight gain of 3 pounds in 1 day or 5 pounds in a week, any recurrent chest discomfort or other new or worsening symptoms.  Also discussed importance of low-salt diet.           Your medication list           Accurate as of 5/2/19  2:24 PM. If you have any questions, ask your nurse or doctor.               CHANGE how you take these medications      Instructions Last Dose Given Next Dose Due   docusate sodium 100 MG capsule  Commonly known as:  COLACE  What changed:    · when to take this  · reasons to take this      Take 1 capsule by mouth 2 (two) times a day.          CONTINUE taking these medications      Instructions Last Dose Given Next Dose Due   APIDRA 100 UNIT/ML injection  Generic drug:   insulin glulisine      Inject 2 Units under the skin into the appropriate area as directed 3 (Three) Times a Day Before Meals.       aspirin 81 MG EC tablet      Take 81 mg by mouth Every Morning.       atorvastatin 40 MG tablet  Commonly known as:  LIPITOR      Take 40 mg by mouth Every Night.       buPROPion  MG 24 hr tablet  Commonly known as:  WELLBUTRIN XL      Take 1 tablet by mouth Daily.       furosemide 40 MG tablet  Commonly known as:  LASIX      Take 0.5 tablets by mouth Daily.       Insulin Glargine 100 UNIT/ML injection pen  Commonly known as:  BASAGLAR KWIKPEN      Inject 14 Units under the skin into the appropriate area as directed Every Night.       levothyroxine 75 MCG tablet  Commonly known as:  SYNTHROID, LEVOTHROID      Take 75 mcg by mouth Daily.       losartan 50 MG tablet  Commonly known as:  COZAAR      Take 1 tablet by mouth Daily.       metoprolol succinate XL 25 MG 24 hr tablet  Commonly known as:  TOPROL-XL      Take 1 tablet by mouth Daily.       potassium chloride 10 MEQ CR tablet  Commonly known as:  K-DUR      Take 1 tablet by mouth Daily.       tamsulosin 0.4 MG capsule 24 hr capsule  Commonly known as:  FLOMAX      Take 1 capsule by mouth Every Night.       vitamin D3 5000 units capsule capsule      Take 5,000 Units by mouth Daily.       warfarin 2 MG tablet  Commonly known as:  COUMADIN      Take 6 mg by mouth Daily.              I did not stop or change the above medications.  Patient's medication list was updated to reflect medications they are currently taking including medication changes made by other providers.          Thanks,    Nadine Blake, JULIO C, APRN  05/02/2019             Dictated utilizing Dragon dictation

## 2019-05-02 NOTE — TELEPHONE ENCOUNTER
Please call patient and ask him to stop by the outpatient lab at the hospital when he comes in for next INR check or when he comes in to get his Holter monitor placed.     Please also remind him to get INR rechecked this week through anticoagulation clinic.

## 2019-05-07 ENCOUNTER — READMISSION MANAGEMENT (OUTPATIENT)
Dept: CALL CENTER | Facility: HOSPITAL | Age: 64
End: 2019-05-07

## 2019-05-07 NOTE — OUTREACH NOTE
Medical Week 2 Survey      Responses   Facility patient discharged from?  [facility]   Does the patient have one of the following disease processes/diagnoses(primary or secondary)?  Other   Week 2 attempt successful?  Yes   Call start time  1231   Discharge diagnosis  Coronary artery disease   Call end time  1233   Meds reviewed with patient/caregiver?  Yes   Is the patient having any side effects they believe may be caused by any medication additions or changes?  No   Does the patient have all medications ordered at discharge?  Yes   Is the patient taking all medications as directed (includes completed medication regime)?  Yes   Does the patient have a primary care provider?   Yes   Does the patient have an appointment with their PCP within 7 days of discharge?  Yes   Has the patient kept scheduled appointments due by today?  N/A   What is the Home health agency?   [facility]   Has home health visited the patient within 72 hours of discharge?  Yes   Has all DME been delivered?  Yes   Psychosocial issues?  No   Did the patient receive a copy of their discharge instructions?  Yes   Nursing interventions  Reviewed instructions with patient   What is the patient's perception of their health status since discharge?  Improving   Is the patient/caregiver able to teach back signs and symptoms related to disease process for when to call PCP?  Yes   Is the patient/caregiver able to teach back signs and symptoms related to disease process for when to call 911?  Yes   Is the patient/caregiver able to teach back the hierarchy of who to call/visit for symptoms/problems? PCP, Specialist, Home health nurse, Urgent Care, ED, 911  Yes   Additional teach back comments  He says he is doing.  Will  batteries to begin daily weights.  Has appts this week.   Week 2 Call Completed?  Yes          Jason Rehman RN

## 2019-05-09 ENCOUNTER — OFFICE VISIT (OUTPATIENT)
Dept: SLEEP MEDICINE | Facility: HOSPITAL | Age: 64
End: 2019-05-09

## 2019-05-09 VITALS
HEIGHT: 72 IN | SYSTOLIC BLOOD PRESSURE: 105 MMHG | HEART RATE: 63 BPM | WEIGHT: 205 LBS | OXYGEN SATURATION: 91 % | BODY MASS INDEX: 27.77 KG/M2 | DIASTOLIC BLOOD PRESSURE: 60 MMHG

## 2019-05-09 DIAGNOSIS — G47.33 OSA ON CPAP: Primary | ICD-10-CM

## 2019-05-09 DIAGNOSIS — R06.83 SNORING: ICD-10-CM

## 2019-05-09 DIAGNOSIS — Z99.89 OSA ON CPAP: Primary | ICD-10-CM

## 2019-05-09 PROCEDURE — 99213 OFFICE O/P EST LOW 20 MIN: CPT | Performed by: INTERNAL MEDICINE

## 2019-05-09 PROCEDURE — G0463 HOSPITAL OUTPT CLINIC VISIT: HCPCS

## 2019-05-09 NOTE — PROGRESS NOTES
SLEEP CLINIC FOLLOW UP PROGRESS NOTE.    [facility] SLEEP MEDICINE    Carlito Mo  63 y.o.  male  1955    PCP: Jyoti Reynolds MD      Date of visit: 5/9/2019      INTERM HISTORY:  This is a 63 y.o. years old patient who has a history of sleep apnea and is on CPAP.  Patient's smartcard download shows 96% compliance and more than 4 hours usage is also 76%.  Unfortunately the residual AHI is still high at 18.9/h.  Patient has a very large leak which is amounting to 63% and average duration of 4 hours and 21 minutes.  Because of his large leak his sleep apnea is not well treated.  He underwent mask fitting in the sleep center by our technician and hopefully we can reduce the leak as the patient was not putting on the mask properly    The Smart card download has been reviewed by me and shows the following..  Compliance;86 %  > 4 hr use, 76 %  Residual AHI: 18.9 /hr (normal less than 5)      PAST MEDICAL HISTORY:  · Obstructive sleep apnea  Past Medical History:   Diagnosis Date   • Acquired hypothyroidism    • Acute congestive heart failure (CMS/HCC)    • Acute respiratory failure with hypoxia (CMS/HCC)    • Acute sinusitis    • SYLVAIN (acute kidney injury) (CMS/HCC)     on CKD   • Anemia    • Arthritis    • CAD (coronary artery disease)    • Cancer (CMS/HCC)     skin   • Chronic combined systolic and diastolic congestive heart failure (CMS/HCC)    • Chronic ischemic heart disease    • CKD (chronic kidney disease)    • Depression    • Diabetes mellitus type 2, uncontrolled, without complications (CMS/HCC)    • Disease of thyroid gland    • Fatigue    • Frequent PVCs    • Heart attack (CMS/HCC)    • History of coronary artery disease     with remote history of bypass surgery in 2001   • History of transfusion    • Hyperglycemia    • Hyperlipidemia    • Hypertension    • Hypertensive encephalopathy    • Mental status change     Acute   • Pleural effusion    • Pneumonia    • RBBB (right bundle  branch block)    • Screening for prostate cancer 2011   • Stroke (CMS/HCC)    • TIA (transient ischemic attack)    • Type 2 diabetes mellitus (CMS/HCC)    • Urinary retention    • Vitamin D deficiency        MEDICATIONS: reviewed by me    Current Outpatient Medications:   •  aspirin 81 MG EC tablet, Take 81 mg by mouth Every Morning., Disp: , Rfl:   •  atorvastatin (LIPITOR) 40 MG tablet, Take 40 mg by mouth Every Night., Disp: , Rfl:   •  buPROPion XL (WELLBUTRIN XL) 150 MG 24 hr tablet, Take 1 tablet by mouth Daily., Disp: , Rfl:   •  Cholecalciferol (VITAMIN D3) 5000 units capsule capsule, Take 5,000 Units by mouth Daily., Disp: , Rfl:   •  docusate sodium (COLACE) 100 MG capsule, Take 1 capsule by mouth 2 (two) times a day. (Patient taking differently: Take 100 mg by mouth As Needed for Constipation.), Disp: 30 capsule, Rfl: 0  •  furosemide (LASIX) 40 MG tablet, Take 0.5 tablets by mouth Daily., Disp: , Rfl:   •  Insulin Glargine (BASAGLAR KWIKPEN) 100 UNIT/ML injection pen, Inject 14 Units under the skin into the appropriate area as directed Every Night., Disp: , Rfl:   •  insulin glulisine (APIDRA) 100 UNIT/ML injection, Inject 2 Units under the skin into the appropriate area as directed 3 (Three) Times a Day Before Meals., Disp: , Rfl:   •  levothyroxine (SYNTHROID, LEVOTHROID) 75 MCG tablet, Take 75 mcg by mouth Daily., Disp: , Rfl:   •  losartan (COZAAR) 50 MG tablet, Take 1 tablet by mouth Daily., Disp: 30 tablet, Rfl: 11  •  metoprolol succinate XL (TOPROL-XL) 25 MG 24 hr tablet, Take 1 tablet by mouth Daily., Disp: 30 tablet, Rfl: 11  •  potassium chloride (K-DUR) 10 MEQ CR tablet, Take 1 tablet by mouth Daily., Disp: , Rfl:   •  tamsulosin (FLOMAX) 0.4 MG capsule 24 hr capsule, Take 1 capsule by mouth Every Night., Disp: , Rfl:   •  warfarin (COUMADIN) 2 MG tablet, Take 6 mg by mouth Daily., Disp: , Rfl:     Allergies   Allergen Reactions   • Fluoxetine Other (See Comments)   • Prozac [Fluoxetine Hcl]  "Other (See Comments)     shaking    reviewed by me    SOCIAL, FAMILY HISTORY: Medical records are reviewed and noted by me.    REVIEW OF SYSTEMS:   [place] Sleepiness Scale :Total score: 9   Snoring: Yes  Feels refreshed after waking up: No  Morning headache: No  Nasal congestion: No  Leg movements: No    PHYSICAL EXAMINATION:  Vitals:    05/09/19 1420   BP: 105/60   Pulse: 63   SpO2: 91%   Weight: 93 kg (205 lb)   Height: 182.9 cm (72\")    Body mass index is 27.8 kg/m².    HEENT: Unremarkable, pupils are round equal and reacting to light, nasal passage is clear   NECK: No lymphadenopathy, throat is clear, oral airway Mallampati class 3  RESPRATORY SYSTEM: Breath sounds are equal on both sides and are normal, no wheezes or crackles  CARDIOVASULAR SYSTEM: Heart rate is regular without murmur  ABDOMEN: Soft, no ascites, no hepatosplenomegaly.  EXTREMITIES: No cyanosis, clubbing or edema       ASSESSMENT AND PLAN:  · Obstructive sleep apnea, patient has a very large leak.  Mask fitting was done and patient will continue to use the CPAP for the next 4 to 6 weeks and return to the clinic for another compliance check.  The DME company is [employer]  · Large mask leak  · Snoring        Albert Wing MD, [facility]  Sleep Medicine. 5/9/2019   [facility]                "

## 2019-05-14 ENCOUNTER — READMISSION MANAGEMENT (OUTPATIENT)
Dept: CALL CENTER | Facility: HOSPITAL | Age: 64
End: 2019-05-14

## 2019-05-14 NOTE — OUTREACH NOTE
Medical Week 3 Survey      Responses   Facility patient discharged from?  [facility]   Does the patient have one of the following disease processes/diagnoses(primary or secondary)?  Other   Week 3 attempt successful?  Yes   Call start time  1619   Call end time  1622   Medication alerts for this patient  no  medication added   Meds reviewed with patient/caregiver?  Yes   Is the patient taking all medications as directed (includes completed medication regime)?  Yes   Comments regarding appointments  has seen the doctor , wanting  him to continue with medication   Has the patient kept scheduled appointments due by today?  Yes   What is the patient's perception of their health status since discharge?  New symptoms unrelated to diagnosis [Son is Aust[place] and is having problems and he is under stress]   Week 3 Call Completed?  Yes   Wrap up additional comments  not sleeping, under stress          Kenya Lyn RN

## 2019-05-16 ENCOUNTER — TELEPHONE (OUTPATIENT)
Dept: CARDIOLOGY | Facility: CLINIC | Age: 64
End: 2019-05-16

## 2019-05-16 NOTE — TELEPHONE ENCOUNTER
Patient's Holter monitor showed only occasional PVCs, though they did occur in a bigeminal pattern when present.  First-degree AV block noted.    Called and discussed with patient.    Still taking metoprolol.  Reports only occasional palpitations. Avoids alcohol but drinks 32oz caffeine daily.    Advised that he drink no more than 8 ounces of caffeine a day, if that.  New, recurrent, or worsening symptoms prior to next visit.  At this point we will keep metoprolol dose the same.

## 2019-05-22 ENCOUNTER — READMISSION MANAGEMENT (OUTPATIENT)
Dept: CALL CENTER | Facility: HOSPITAL | Age: 64
End: 2019-05-22

## 2019-05-22 NOTE — OUTREACH NOTE
Medical Week 4 Survey      Responses   Facility patient discharged from?  [facility]   Does the patient have one of the following disease processes/diagnoses(primary or secondary)?  Other   Week 4 attempt successful?  No          Amber Mcmillan RN

## 2019-05-25 ENCOUNTER — APPOINTMENT (OUTPATIENT)
Dept: GENERAL RADIOLOGY | Facility: HOSPITAL | Age: 64
End: 2019-05-25

## 2019-05-25 ENCOUNTER — HOSPITAL ENCOUNTER (EMERGENCY)
Facility: HOSPITAL | Age: 64
Discharge: HOME | End: 2019-05-25
Attending: EMERGENCY MEDICINE

## 2019-05-25 VITALS
BODY MASS INDEX: 28.13 KG/M2 | OXYGEN SATURATION: 92 % | DIASTOLIC BLOOD PRESSURE: 74 MMHG | TEMPERATURE: 97.5 F | RESPIRATION RATE: 20 BRPM | WEIGHT: 207.7 LBS | HEIGHT: 72 IN | HEART RATE: 63 BPM | SYSTOLIC BLOOD PRESSURE: 136 MMHG

## 2019-05-25 DIAGNOSIS — N18.9 HISTORY OF ANEMIA DUE TO CHRONIC KIDNEY DISEASE: ICD-10-CM

## 2019-05-25 DIAGNOSIS — Z86.2 HISTORY OF ANEMIA DUE TO CHRONIC KIDNEY DISEASE: ICD-10-CM

## 2019-05-25 DIAGNOSIS — R60.0 BILATERAL LOWER EXTREMITY EDEMA: Primary | ICD-10-CM

## 2019-05-25 LAB
ALBUMIN SERPL-MCNC: 3.7 G/DL (ref 3.5–5.2)
ALBUMIN/GLOB SERPL: 1.7 G/DL
ALP SERPL-CCNC: 80 U/L (ref 39–117)
ALT SERPL W P-5'-P-CCNC: 26 U/L (ref 1–41)
ANION GAP SERPL CALCULATED.3IONS-SCNC: 11.9 MMOL/L
AST SERPL-CCNC: 19 U/L (ref 1–40)
BASOPHILS # BLD AUTO: 0.01 10*3/MM3 (ref 0–0.2)
BASOPHILS NFR BLD AUTO: 0.2 % (ref 0–1.5)
BILIRUB SERPL-MCNC: 0.4 MG/DL (ref 0.2–1.2)
BUN BLD-MCNC: 33 MG/DL (ref 8–23)
BUN/CREAT SERPL: 12.7 (ref 7–25)
CALCIUM SPEC-SCNC: 8.5 MG/DL (ref 8.6–10.5)
CHLORIDE SERPL-SCNC: 107 MMOL/L (ref 98–107)
CO2 SERPL-SCNC: 22.1 MMOL/L (ref 22–29)
CREAT BLD-MCNC: 2.59 MG/DL (ref 0.76–1.27)
DEPRECATED RDW RBC AUTO: 41.1 FL (ref 37–54)
EOSINOPHIL # BLD AUTO: 0.17 10*3/MM3 (ref 0–0.4)
EOSINOPHIL NFR BLD AUTO: 2.7 % (ref 0.3–6.2)
ERYTHROCYTE [DISTWIDTH] IN BLOOD BY AUTOMATED COUNT: 13.6 % (ref 12.3–15.4)
GFR SERPL CREATININE-BSD FRML MDRD: 25 ML/MIN/1.73
GLOBULIN UR ELPH-MCNC: 2.2 GM/DL
GLUCOSE BLD-MCNC: 261 MG/DL (ref 65–99)
HCT VFR BLD AUTO: 31.5 % (ref 37.5–51)
HGB BLD-MCNC: 9.4 G/DL (ref 13–17.7)
HOLD SPECIMEN: NORMAL
HOLD SPECIMEN: NORMAL
IMM GRANULOCYTES # BLD AUTO: 0.01 10*3/MM3 (ref 0–0.05)
IMM GRANULOCYTES NFR BLD AUTO: 0.2 % (ref 0–0.5)
INR PPP: 1.29 (ref 0.9–1.1)
LYMPHOCYTES # BLD AUTO: 1.06 10*3/MM3 (ref 0.7–3.1)
LYMPHOCYTES NFR BLD AUTO: 16.8 % (ref 19.6–45.3)
MCH RBC QN AUTO: 24.6 PG (ref 26.6–33)
MCHC RBC AUTO-ENTMCNC: 29.8 G/DL (ref 31.5–35.7)
MCV RBC AUTO: 82.5 FL (ref 79–97)
MONOCYTES # BLD AUTO: 0.55 10*3/MM3 (ref 0.1–0.9)
MONOCYTES NFR BLD AUTO: 8.7 % (ref 5–12)
NEUTROPHILS # BLD AUTO: 4.52 10*3/MM3 (ref 1.7–7)
NEUTROPHILS NFR BLD AUTO: 71.4 % (ref 42.7–76)
NRBC BLD AUTO-RTO: 0 /100 WBC (ref 0–0.2)
NT-PROBNP SERPL-MCNC: 2887 PG/ML (ref 5–900)
PLATELET # BLD AUTO: 266 10*3/MM3 (ref 140–450)
PMV BLD AUTO: 11.1 FL (ref 6–12)
POTASSIUM BLD-SCNC: 5 MMOL/L (ref 3.5–5.2)
PROT SERPL-MCNC: 5.9 G/DL (ref 6–8.5)
PROTHROMBIN TIME: 15.8 SECONDS (ref 11.7–14.2)
RBC # BLD AUTO: 3.82 10*6/MM3 (ref 4.14–5.8)
SODIUM BLD-SCNC: 141 MMOL/L (ref 136–145)
TROPONIN T SERPL-MCNC: 0.07 NG/ML (ref 0–0.03)
WBC NRBC COR # BLD: 6.32 10*3/MM3 (ref 3.4–10.8)
WHOLE BLOOD HOLD SPECIMEN: NORMAL
WHOLE BLOOD HOLD SPECIMEN: NORMAL

## 2019-05-25 PROCEDURE — 80053 COMPREHEN METABOLIC PANEL: CPT | Performed by: EMERGENCY MEDICINE

## 2019-05-25 PROCEDURE — 99284 EMERGENCY DEPT VISIT MOD MDM: CPT

## 2019-05-25 PROCEDURE — 84484 ASSAY OF TROPONIN QUANT: CPT | Performed by: EMERGENCY MEDICINE

## 2019-05-25 PROCEDURE — 85610 PROTHROMBIN TIME: CPT

## 2019-05-25 PROCEDURE — 71046 X-RAY EXAM CHEST 2 VIEWS: CPT

## 2019-05-25 PROCEDURE — 83880 ASSAY OF NATRIURETIC PEPTIDE: CPT | Performed by: EMERGENCY MEDICINE

## 2019-05-25 PROCEDURE — 85025 COMPLETE CBC W/AUTO DIFF WBC: CPT

## 2019-05-25 PROCEDURE — 93005 ELECTROCARDIOGRAM TRACING: CPT | Performed by: EMERGENCY MEDICINE

## 2019-05-25 RX ORDER — SODIUM CHLORIDE 0.9 % (FLUSH) 0.9 %
10 SYRINGE (ML) INJECTION AS NEEDED
Status: DISCONTINUED | OUTPATIENT
Start: 2019-05-25 | End: 2019-05-26 | Stop reason: HOSPADM

## 2019-05-31 ENCOUNTER — TELEPHONE (OUTPATIENT)
Dept: PHARMACY | Facility: HOSPITAL | Age: 64
End: 2019-05-31

## 2019-06-03 ENCOUNTER — ANTICOAGULATION VISIT (OUTPATIENT)
Dept: PHARMACY | Facility: HOSPITAL | Age: 64
End: 2019-06-03

## 2019-06-03 DIAGNOSIS — I63.10 CEREBROVASCULAR ACCIDENT (CVA) DUE TO EMBOLISM OF PRECEREBRAL ARTERY (HCC): ICD-10-CM

## 2019-06-03 LAB
INR PPP: 1.8 (ref 0.91–1.09)
PROTHROMBIN TIME: 21.8 SECONDS (ref 10–13.8)

## 2019-06-03 PROCEDURE — G0463 HOSPITAL OUTPT CLINIC VISIT: HCPCS

## 2019-06-03 PROCEDURE — 36416 COLLJ CAPILLARY BLOOD SPEC: CPT

## 2019-06-03 PROCEDURE — 85610 PROTHROMBIN TIME: CPT

## 2019-06-03 NOTE — PROGRESS NOTES
"Anticoagulation Clinic Progress Note    Anticoagulation Summary  As of 6/3/2019    INR goal:   2.0-3.0   TTR:   36.2 % (4.2 mo)   INR used for dosin.8! (6/3/2019)   Warfarin maintenance plan:   8 mg every Mon, Thu; 6 mg all other days   Weekly warfarin total:   46 mg   Plan last modified:   Gregory Mei RPH (6/3/2019)   Next INR check:   2019   Priority:   High   Target end date:       Indications    Cerebrovascular accident (CVA) due to embolism of precerebral artery (CMS/HCC) [I63.10]             Anticoagulation Episode Summary     INR check location:       Preferred lab:       Send INR reminders to:    [facility]    Comments:         Anticoagulation Care Providers     Provider Role Specialty Phone number    Amber Murguia MD Referring Cardiology [phone]          Clinic Interview:  Patient Findings     Positives:   Change in health, Missed doses, Hospital admission    Negatives:   Signs/symptoms of thrombosis, Signs/symptoms of bleeding,   Laboratory test error suspected, Change in alcohol use, Change in   activity, Upcoming invasive procedure, Emergency department visit,   Upcoming dental procedure, Extra doses, Change in medications, Change in   diet/appetite, Bruising, Other complaints    Comments:   Admitted with CHF exacerbation in [facility] in   May; discharged ~; reports he was off warfarin during this admission   but cannot describe why (\"they messed up my medications\"). Pt requests to   not split warfarin tablets.      Clinical Outcomes     Negatives:   Major bleeding event, Thromboembolic event,   Anticoagulation-related hospital admission, Anticoagulation-related ED   visit, Anticoagulation-related fatality    Comments:   Admitted with CHF exacerbation in [facility] in   May; discharged ~; reports he was off warfarin during this admission   but cannot describe why (\"they messed up my medications\"). Pt requests to   not split " warfarin tablets.        INR History:  Anticoagulation Monitoring 3/26/2019 5/2/2019 6/3/2019   INR 2.5 1.7 1.8   INR Date 3/26/2019 5/2/2019 6/3/2019   INR Goal 2.0-3.0 2.0-3.0 2.0-3.0   Trend Same Up Up   Last Week Total 42 mg 42 mg 45 mg   Next Week Total 42 mg 45 mg 46 mg   Sun 6 mg 6 mg 6 mg   Mon 6 mg 6 mg 8 mg   Tue 6 mg 6 mg 6 mg   Wed 6 mg 6 mg 6 mg   Thu 6 mg 9 mg 8 mg   Fri 6 mg 6 mg 6 mg   Sat 6 mg 6 mg 6 mg   Visit Report - - -   Some recent data might be hidden       Plan:  1. INR is Subtherapeutic today- see above in Anticoagulation Summary.  Will instruct Carlito JANINE Mo to Increase their warfarin regimen- see above in Anticoagulation Summary.  2. Follow up in 2 weeks  3. Patient declines warfarin refills.  4. Verbal and written information provided. Patient expresses understanding and has no further questions at this time.    Gregory Mei RP

## 2019-06-20 ENCOUNTER — ANTICOAGULATION VISIT (OUTPATIENT)
Dept: PHARMACY | Facility: HOSPITAL | Age: 64
End: 2019-06-20

## 2019-06-20 ENCOUNTER — OFFICE VISIT (OUTPATIENT)
Dept: SLEEP MEDICINE | Facility: HOSPITAL | Age: 64
End: 2019-06-20

## 2019-06-20 VITALS
OXYGEN SATURATION: 94 % | HEART RATE: 64 BPM | WEIGHT: 194.6 LBS | BODY MASS INDEX: 26.36 KG/M2 | SYSTOLIC BLOOD PRESSURE: 138 MMHG | DIASTOLIC BLOOD PRESSURE: 77 MMHG | HEIGHT: 72 IN

## 2019-06-20 DIAGNOSIS — G47.33 OSA ON CPAP: Primary | ICD-10-CM

## 2019-06-20 DIAGNOSIS — I63.10 CEREBROVASCULAR ACCIDENT (CVA) DUE TO EMBOLISM OF PRECEREBRAL ARTERY (HCC): ICD-10-CM

## 2019-06-20 DIAGNOSIS — Z99.89 OSA ON CPAP: Primary | ICD-10-CM

## 2019-06-20 LAB
INR PPP: 2.4 (ref 0.91–1.09)
PROTHROMBIN TIME: 29.3 SECONDS (ref 10–13.8)

## 2019-06-20 PROCEDURE — G0463 HOSPITAL OUTPT CLINIC VISIT: HCPCS

## 2019-06-20 PROCEDURE — 99213 OFFICE O/P EST LOW 20 MIN: CPT | Performed by: INTERNAL MEDICINE

## 2019-06-20 PROCEDURE — 85610 PROTHROMBIN TIME: CPT

## 2019-06-20 PROCEDURE — 36416 COLLJ CAPILLARY BLOOD SPEC: CPT

## 2019-06-20 NOTE — PROGRESS NOTES
Anticoagulation Clinic Progress Note    Anticoagulation Summary  As of 2019    INR goal:   2.0-3.0   TTR:   39.8 % (4.8 mo)   INR used for dosin.4 (2019)   Warfarin maintenance plan:   8 mg every Mon, Thu; 6 mg all other days   Weekly warfarin total:   46 mg   No change documented:   Aron Ulloa RPH   Plan last modified:   Gregory Mei RPH (6/3/2019)   Next INR check:   2019   Priority:   High   Target end date:       Indications    Cerebrovascular accident (CVA) due to embolism of precerebral artery (CMS/HCC) [I63.10]             Anticoagulation Episode Summary     INR check location:       Preferred lab:       Send INR reminders to:    [facility]    Comments:         Anticoagulation Care Providers     Provider Role Specialty Phone number    Amber Murguia MD Referring Cardiology [phone]          Clinic Interview:  Patient Findings     Positives:   Missed doses        INR History:  Anticoagulation Monitoring 2019 6/3/2019 2019   INR 1.7 1.8 2.4   INR Date 2019 6/3/2019 2019   INR Goal 2.0-3.0 2.0-3.0 2.0-3.0   Trend Up Up Same   Last Week Total 42 mg 45 mg 34 mg   Next Week Total 45 mg 46 mg 46 mg   Sun 6 mg 6 mg 6 mg   Mon 6 mg 8 mg 8 mg   Tue 6 mg 6 mg 6 mg   Wed 6 mg 6 mg 6 mg   Thu 9 mg 8 mg 8 mg   Fri 6 mg 6 mg 6 mg   Sat 6 mg 6 mg 6 mg   Visit Report - - -   Some recent data might be hidden       Plan:  1. INR is therapeutic today- see above in Anticoagulation Summary.   Will instruct Carlito Mo to continue their warfarin regimen- see above in Anticoagulation Summary.  2. Follow up in 2 weeks.  3. Patient declines warfarin refills.  4. Verbal and written information provided. Patient expresses understanding and has no further questions at this time.    Victorina Perry

## 2019-06-20 NOTE — PROGRESS NOTES
[facility]  [address]  3rd [place] [address]  Phone   Fax     SLEEP CLINIC FOLLOW UP PROGRESS NOTE.    Carlito MEHTA Chepe  1955  64 y.o.  male      PCP: Jyoti Reynolds MD      Date of visit: 6/20/2019    Chief Complaint   Patient presents with   • Follow-up   • Sleep Apnea       INTERM HISTORY:  This is a 64 y.o. years old patient who has a history of sleep apnea and is on CPAP.  He was seen in the clinic on 9 May where he had a large leak.  We have fitted him and mask.  Patient is here for another follow-up.  Unfortunately I still see a large leak which amounts to about 62% with an average duration of 4 hours and 7 minutes.  He still has a high residual AHI at 14/h.  After talking to the patient it was determined that the patient was not tightening the straps.  We have reeducated the patient about tightening and also we have given him a medium air touch cushion mask.  The pressure was tested at 13 cm and the leak he is down to 38% which is acceptable.  Patient is instructed to use this mask for 4 to 6 weeks and follow-up in the clinic.    Sleep schedule  Normally goes to bed at 11 PM  Wakes up at 7 AM  Feels refreshed after waking up: No    The Smart card downloaded on 6/20/2019 has been reviewed by me and shows the following..  Compliance; 100% %  > 4 hr use, 90% %  Average use of the device 6 hours and 41 minutes per night  Residual AHI: 14 /hr (normal less than 5).  Large leak is detected      PAST MEDICAL HISTORY:  · Obstructive sleep apnea  Past Medical History:   Diagnosis Date   • Acquired hypothyroidism    • Acute congestive heart failure (CMS/HCC)    • Acute respiratory failure with hypoxia (CMS/HCC)    • Acute sinusitis    • SYLVAIN (acute kidney injury) (CMS/HCC)     on CKD   • Anemia    • Arthritis    • CAD (coronary artery disease)    • Cancer (CMS/HCC)     skin   • Chronic combined systolic and diastolic congestive heart failure (CMS/HCC)    •  Chronic ischemic heart disease    • CKD (chronic kidney disease)    • Depression    • Diabetes mellitus type 2, uncontrolled, without complications (CMS/HCC)    • Disease of thyroid gland    • Fatigue    • Frequent PVCs    • Heart attack (CMS/HCC)    • History of coronary artery disease     with remote history of bypass surgery in 2001   • History of transfusion    • Hyperglycemia    • Hyperlipidemia    • Hypertension    • Hypertensive encephalopathy    • Mental status change     Acute   • Pleural effusion    • Pneumonia    • RBBB (right bundle branch block)    • Screening for prostate cancer 2011   • Stroke (CMS/HCC)    • TIA (transient ischemic attack)    • Type 2 diabetes mellitus (CMS/HCC)    • Urinary retention    • Vitamin D deficiency        MEDICATIONS: reviewed by me    Current Outpatient Medications:   •  aspirin 81 MG EC tablet, Take 81 mg by mouth Every Morning., Disp: , Rfl:   •  atorvastatin (LIPITOR) 40 MG tablet, Take 40 mg by mouth Every Night., Disp: , Rfl:   •  buPROPion XL (WELLBUTRIN XL) 150 MG 24 hr tablet, Take 1 tablet by mouth Daily., Disp: , Rfl:   •  Cholecalciferol (VITAMIN D3) 5000 units capsule capsule, Take 5,000 Units by mouth Daily., Disp: , Rfl:   •  docusate sodium (COLACE) 100 MG capsule, Take 1 capsule by mouth 2 (two) times a day. (Patient taking differently: Take 100 mg by mouth As Needed for Constipation.), Disp: 30 capsule, Rfl: 0  •  furosemide (LASIX) 40 MG tablet, Take 0.5 tablets by mouth Daily., Disp: , Rfl:   •  Insulin Glargine (BASAGLAR KWIKPEN) 100 UNIT/ML injection pen, Inject 14 Units under the skin into the appropriate area as directed Every Night., Disp: , Rfl:   •  insulin glulisine (APIDRA) 100 UNIT/ML injection, Inject 2 Units under the skin into the appropriate area as directed 3 (Three) Times a Day Before Meals., Disp: , Rfl:   •  levothyroxine (SYNTHROID, LEVOTHROID) 75 MCG tablet, Take 75 mcg by mouth Daily., Disp: , Rfl:   •  losartan (COZAAR) 50 MG  "tablet, Take 1 tablet by mouth Daily., Disp: 30 tablet, Rfl: 11  •  metoprolol succinate XL (TOPROL-XL) 25 MG 24 hr tablet, Take 1 tablet by mouth Daily., Disp: 30 tablet, Rfl: 11  •  potassium chloride (K-DUR) 10 MEQ CR tablet, Take 1 tablet by mouth Daily., Disp: , Rfl:   •  tamsulosin (FLOMAX) 0.4 MG capsule 24 hr capsule, Take 1 capsule by mouth Every Night., Disp: , Rfl:   •  warfarin (COUMADIN) 2 MG tablet, Take 6 mg by mouth Daily., Disp: , Rfl:     Allergies   Allergen Reactions   • Fluoxetine Other (See Comments)   • Prozac [Fluoxetine Hcl] Other (See Comments)     shaking    reviewed by me    SOCIAL, FAMILY HISTORY: Medical records are reviewed and noted by me.    REVIEW OF SYSTEMS:   [place] Sleepiness Scale :Total score: 7   Snoring: Yes  Morning headache: No  Nasal congestion: No  Leg movements: No  Leg swelling no  Irregular heart beat no  Heart burn no    PHYSICAL EXAMINATION:  Vitals:    06/20/19 1446   BP: 138/77   BP Location: Left arm   Patient Position: Sitting   Cuff Size: Large Adult   Pulse: 64   SpO2: 94%   Weight: 88.3 kg (194 lb 9.6 oz)   Height: 182.9 cm (72\")    Body mass index is 26.39 kg/m².    HEENT: pupils are round equal and reacting to light and accommodation, nasal passage is clear, no nasal polyps, no lymphadenopathy, throat is clear, oral airway Mallampati class 3  RESPRATORY SYSTEM: Breath sounds are equal on both sides and are normal, no wheezes or crackles  CARDIOVASULAR SYSTEM: Heart rate is regular without murmur  ABDOMEN: Soft, no ascites, no hepatosplenomegaly.  EXTREMITIES: No cyanosis, clubbing or edema       ASSESSMENT AND PLAN:  · Obstructive sleep apnea, patient's compliance is good but he still has a very high leak.  Patient was fitted with a medium air touch cushion mask pressure tested for 13 cm and the air leak is only 38%.  Patient is instructed to use this mask and see me in about 4 to 6 weeks for follow-up.  If the patient continues to exhibit large leak then " it means that the patient is not tightening the straps.  The DME company is [employer]  · Mask fitting done in the clinic  ·         Albert Wing MD, [facility]  Sleep [facility]. 6/20/2019   [facility]

## 2019-07-02 ENCOUNTER — APPOINTMENT (OUTPATIENT)
Dept: GENERAL RADIOLOGY | Facility: HOSPITAL | Age: 64
End: 2019-07-02

## 2019-07-02 ENCOUNTER — HOSPITAL ENCOUNTER (INPATIENT)
Facility: HOSPITAL | Age: 64
LOS: 4 days | Discharge: HOME | End: 2019-07-06
Attending: EMERGENCY MEDICINE | Admitting: INTERNAL MEDICINE

## 2019-07-02 DIAGNOSIS — R06.02 SHORTNESS OF BREATH: ICD-10-CM

## 2019-07-02 DIAGNOSIS — I50.9 ACUTE ON CHRONIC CONGESTIVE HEART FAILURE, UNSPECIFIED HEART FAILURE TYPE (HCC): Primary | ICD-10-CM

## 2019-07-02 DIAGNOSIS — N18.30 CKD (CHRONIC KIDNEY DISEASE) STAGE 3, GFR 30-59 ML/MIN (HCC): ICD-10-CM

## 2019-07-02 DIAGNOSIS — I50.23 ACUTE ON CHRONIC SYSTOLIC CONGESTIVE HEART FAILURE (HCC): ICD-10-CM

## 2019-07-02 DIAGNOSIS — I25.9 CHRONIC ISCHEMIC HEART DISEASE: ICD-10-CM

## 2019-07-02 PROBLEM — R77.8 ELEVATED TROPONIN: Status: ACTIVE | Noted: 2019-07-02

## 2019-07-02 PROBLEM — R79.89 ELEVATED TROPONIN: Status: ACTIVE | Noted: 2019-07-02

## 2019-07-02 LAB
ANION GAP SERPL CALCULATED.3IONS-SCNC: 12.2 MMOL/L (ref 5–15)
BASOPHILS # BLD AUTO: 0.01 10*3/MM3 (ref 0–0.2)
BASOPHILS NFR BLD AUTO: 0.2 % (ref 0–1.5)
BUN BLD-MCNC: 31 MG/DL (ref 8–23)
BUN/CREAT SERPL: 19.5 (ref 7–25)
CALCIUM SPEC-SCNC: 9.5 MG/DL (ref 8.6–10.5)
CHLORIDE SERPL-SCNC: 111 MMOL/L (ref 98–107)
CO2 SERPL-SCNC: 22.8 MMOL/L (ref 22–29)
CREAT BLD-MCNC: 1.59 MG/DL (ref 0.76–1.27)
DEPRECATED RDW RBC AUTO: 43.7 FL (ref 37–54)
EOSINOPHIL # BLD AUTO: 0.2 10*3/MM3 (ref 0–0.4)
EOSINOPHIL NFR BLD AUTO: 3 % (ref 0.3–6.2)
ERYTHROCYTE [DISTWIDTH] IN BLOOD BY AUTOMATED COUNT: 14.9 % (ref 12.3–15.4)
GFR SERPL CREATININE-BSD FRML MDRD: 44 ML/MIN/1.73
GLUCOSE BLD-MCNC: 119 MG/DL (ref 65–99)
GLUCOSE BLDC GLUCOMTR-MCNC: 243 MG/DL (ref 70–130)
HCT VFR BLD AUTO: 33.4 % (ref 37.5–51)
HGB BLD-MCNC: 10.1 G/DL (ref 13–17.7)
IMM GRANULOCYTES # BLD AUTO: 0.01 10*3/MM3 (ref 0–0.05)
IMM GRANULOCYTES NFR BLD AUTO: 0.2 % (ref 0–0.5)
INR PPP: 1.62 (ref 0.9–1.1)
LYMPHOCYTES # BLD AUTO: 1.38 10*3/MM3 (ref 0.7–3.1)
LYMPHOCYTES NFR BLD AUTO: 20.8 % (ref 19.6–45.3)
MCH RBC QN AUTO: 24.5 PG (ref 26.6–33)
MCHC RBC AUTO-ENTMCNC: 30.2 G/DL (ref 31.5–35.7)
MCV RBC AUTO: 80.9 FL (ref 79–97)
MONOCYTES # BLD AUTO: 0.6 10*3/MM3 (ref 0.1–0.9)
MONOCYTES NFR BLD AUTO: 9 % (ref 5–12)
NEUTROPHILS # BLD AUTO: 4.43 10*3/MM3 (ref 1.7–7)
NEUTROPHILS NFR BLD AUTO: 66.8 % (ref 42.7–76)
NRBC BLD AUTO-RTO: 0 /100 WBC (ref 0–0.2)
NT-PROBNP SERPL-MCNC: 5677 PG/ML (ref 5–900)
PLATELET # BLD AUTO: 278 10*3/MM3 (ref 140–450)
PMV BLD AUTO: 10.3 FL (ref 6–12)
POTASSIUM BLD-SCNC: 4.3 MMOL/L (ref 3.5–5.2)
PROTHROMBIN TIME: 18.9 SECONDS (ref 11.7–14.2)
RBC # BLD AUTO: 4.13 10*6/MM3 (ref 4.14–5.8)
SODIUM BLD-SCNC: 146 MMOL/L (ref 136–145)
TROPONIN T SERPL-MCNC: 0.08 NG/ML (ref 0–0.03)
WBC NRBC COR # BLD: 6.63 10*3/MM3 (ref 3.4–10.8)

## 2019-07-02 PROCEDURE — 85025 COMPLETE CBC W/AUTO DIFF WBC: CPT | Performed by: EMERGENCY MEDICINE

## 2019-07-02 PROCEDURE — 85610 PROTHROMBIN TIME: CPT | Performed by: INTERNAL MEDICINE

## 2019-07-02 PROCEDURE — 99284 EMERGENCY DEPT VISIT MOD MDM: CPT

## 2019-07-02 PROCEDURE — 84484 ASSAY OF TROPONIN QUANT: CPT | Performed by: EMERGENCY MEDICINE

## 2019-07-02 PROCEDURE — 93005 ELECTROCARDIOGRAM TRACING: CPT | Performed by: EMERGENCY MEDICINE

## 2019-07-02 PROCEDURE — 93010 ELECTROCARDIOGRAM REPORT: CPT | Performed by: INTERNAL MEDICINE

## 2019-07-02 PROCEDURE — 85610 PROTHROMBIN TIME: CPT | Performed by: EMERGENCY MEDICINE

## 2019-07-02 PROCEDURE — 83880 ASSAY OF NATRIURETIC PEPTIDE: CPT | Performed by: EMERGENCY MEDICINE

## 2019-07-02 PROCEDURE — 25010000002 FUROSEMIDE PER 20 MG: Performed by: EMERGENCY MEDICINE

## 2019-07-02 PROCEDURE — 71046 X-RAY EXAM CHEST 2 VIEWS: CPT

## 2019-07-02 PROCEDURE — 82962 GLUCOSE BLOOD TEST: CPT

## 2019-07-02 PROCEDURE — 80048 BASIC METABOLIC PNL TOTAL CA: CPT | Performed by: EMERGENCY MEDICINE

## 2019-07-02 RX ORDER — SODIUM CHLORIDE 0.9 % (FLUSH) 0.9 %
10 SYRINGE (ML) INJECTION AS NEEDED
Status: DISCONTINUED | OUTPATIENT
Start: 2019-07-02 | End: 2019-07-06 | Stop reason: HOSPADM

## 2019-07-02 RX ORDER — NICOTINE POLACRILEX 4 MG
15 LOZENGE BUCCAL
Status: DISCONTINUED | OUTPATIENT
Start: 2019-07-02 | End: 2019-07-05 | Stop reason: SDUPTHER

## 2019-07-02 RX ORDER — FERROUS SULFATE 325(65) MG
325 TABLET ORAL
Status: DISCONTINUED | OUTPATIENT
Start: 2019-07-03 | End: 2019-07-06 | Stop reason: HOSPADM

## 2019-07-02 RX ORDER — MELATONIN
1000 DAILY
Status: DISCONTINUED | OUTPATIENT
Start: 2019-07-03 | End: 2019-07-06 | Stop reason: HOSPADM

## 2019-07-02 RX ORDER — SODIUM CHLORIDE 0.9 % (FLUSH) 0.9 %
3 SYRINGE (ML) INJECTION EVERY 12 HOURS SCHEDULED
Status: DISCONTINUED | OUTPATIENT
Start: 2019-07-03 | End: 2019-07-06 | Stop reason: HOSPADM

## 2019-07-02 RX ORDER — AMLODIPINE BESYLATE 5 MG/1
5 TABLET ORAL DAILY
COMMUNITY
End: 2020-06-04 | Stop reason: HOSPADM

## 2019-07-02 RX ORDER — ASPIRIN 81 MG/1
324 TABLET, CHEWABLE ORAL ONCE
Status: COMPLETED | OUTPATIENT
Start: 2019-07-03 | End: 2019-07-03

## 2019-07-02 RX ORDER — ASPIRIN 81 MG/1
81 TABLET ORAL EVERY MORNING
Status: DISCONTINUED | OUTPATIENT
Start: 2019-07-03 | End: 2019-07-06 | Stop reason: HOSPADM

## 2019-07-02 RX ORDER — DEXTROSE MONOHYDRATE 25 G/50ML
25 INJECTION, SOLUTION INTRAVENOUS
Status: DISCONTINUED | OUTPATIENT
Start: 2019-07-02 | End: 2019-07-05 | Stop reason: SDUPTHER

## 2019-07-02 RX ORDER — FUROSEMIDE 10 MG/ML
60 INJECTION INTRAMUSCULAR; INTRAVENOUS
Status: DISCONTINUED | OUTPATIENT
Start: 2019-07-03 | End: 2019-07-05

## 2019-07-02 RX ORDER — INSULIN GLARGINE 100 [IU]/ML
14 INJECTION, SOLUTION SUBCUTANEOUS NIGHTLY
Status: DISCONTINUED | OUTPATIENT
Start: 2019-07-03 | End: 2019-07-04

## 2019-07-02 RX ORDER — ASPIRIN 81 MG/1
324 TABLET, CHEWABLE ORAL ONCE
Status: COMPLETED | OUTPATIENT
Start: 2019-07-02 | End: 2019-07-02

## 2019-07-02 RX ORDER — ONDANSETRON 2 MG/ML
4 INJECTION INTRAMUSCULAR; INTRAVENOUS EVERY 6 HOURS PRN
Status: DISCONTINUED | OUTPATIENT
Start: 2019-07-02 | End: 2019-07-06 | Stop reason: HOSPADM

## 2019-07-02 RX ORDER — METOPROLOL SUCCINATE 25 MG/1
25 TABLET, EXTENDED RELEASE ORAL
Status: DISCONTINUED | OUTPATIENT
Start: 2019-07-03 | End: 2019-07-06 | Stop reason: HOSPADM

## 2019-07-02 RX ORDER — SODIUM CHLORIDE 0.9 % (FLUSH) 0.9 %
3-10 SYRINGE (ML) INJECTION AS NEEDED
Status: DISCONTINUED | OUTPATIENT
Start: 2019-07-02 | End: 2019-07-06 | Stop reason: HOSPADM

## 2019-07-02 RX ORDER — BUPROPION HYDROCHLORIDE 150 MG/1
150 TABLET ORAL DAILY
Status: DISCONTINUED | OUTPATIENT
Start: 2019-07-03 | End: 2019-07-06 | Stop reason: HOSPADM

## 2019-07-02 RX ORDER — LOSARTAN POTASSIUM 50 MG/1
50 TABLET ORAL DAILY
Status: DISCONTINUED | OUTPATIENT
Start: 2019-07-03 | End: 2019-07-06 | Stop reason: HOSPADM

## 2019-07-02 RX ORDER — FUROSEMIDE 10 MG/ML
60 INJECTION INTRAMUSCULAR; INTRAVENOUS ONCE
Status: COMPLETED | OUTPATIENT
Start: 2019-07-02 | End: 2019-07-02

## 2019-07-02 RX ORDER — ATORVASTATIN CALCIUM 20 MG/1
40 TABLET, FILM COATED ORAL NIGHTLY
Status: DISCONTINUED | OUTPATIENT
Start: 2019-07-03 | End: 2019-07-06 | Stop reason: HOSPADM

## 2019-07-02 RX ORDER — IPRATROPIUM BROMIDE AND ALBUTEROL SULFATE 2.5; .5 MG/3ML; MG/3ML
3 SOLUTION RESPIRATORY (INHALATION)
Status: DISCONTINUED | OUTPATIENT
Start: 2019-07-03 | End: 2019-07-05

## 2019-07-02 RX ORDER — LEVOTHYROXINE SODIUM 0.07 MG/1
75 TABLET ORAL
Status: DISCONTINUED | OUTPATIENT
Start: 2019-07-03 | End: 2019-07-06 | Stop reason: HOSPADM

## 2019-07-02 RX ORDER — TAMSULOSIN HYDROCHLORIDE 0.4 MG/1
0.4 CAPSULE ORAL NIGHTLY
Status: DISCONTINUED | OUTPATIENT
Start: 2019-07-03 | End: 2019-07-06 | Stop reason: HOSPADM

## 2019-07-02 RX ORDER — NITROGLYCERIN 0.4 MG/1
0.4 TABLET SUBLINGUAL
Status: DISCONTINUED | OUTPATIENT
Start: 2019-07-02 | End: 2019-07-06 | Stop reason: HOSPADM

## 2019-07-02 RX ORDER — AMLODIPINE BESYLATE 5 MG/1
5 TABLET ORAL DAILY
Status: DISCONTINUED | OUTPATIENT
Start: 2019-07-03 | End: 2019-07-06 | Stop reason: HOSPADM

## 2019-07-02 RX ORDER — WARFARIN SODIUM 6 MG/1
6 TABLET ORAL DAILY
Status: DISCONTINUED | OUTPATIENT
Start: 2019-07-03 | End: 2019-07-02 | Stop reason: DRUGHIGH

## 2019-07-02 RX ORDER — FERROUS SULFATE 325(65) MG
325 TABLET ORAL
Status: ON HOLD | COMMUNITY
End: 2021-05-21

## 2019-07-02 RX ADMIN — ASPIRIN 324 MG: 81 TABLET, CHEWABLE ORAL at 19:39

## 2019-07-02 RX ADMIN — SODIUM CHLORIDE, PRESERVATIVE FREE 3 ML: 5 INJECTION INTRAVENOUS at 23:33

## 2019-07-02 RX ADMIN — FUROSEMIDE 60 MG: 10 INJECTION, SOLUTION INTRAMUSCULAR; INTRAVENOUS at 19:36

## 2019-07-02 RX ADMIN — SODIUM CHLORIDE, PRESERVATIVE FREE 10 ML: 5 INJECTION INTRAVENOUS at 22:30

## 2019-07-02 NOTE — ED PROVIDER NOTES
EMERGENCY DEPARTMENT ENCOUNTER    Room Number:  40/40  Date seen:  7/2/2019  Time seen: 6:15 PM  PCP: Jyoti Reynolds MD  Historian: patient      HPI:  Chief Complaint: shortness of breath  A complete HPI/ROS/PMH/PSH/SH/FH are unobtainable due to: nothing  Context: Carlito Mo is a 64 y.o. male who presents to the ED c/o shortness of breath which has been ongoing for the past two days. The patient denies orthopnea. The patient also complains of associated worsening of chronic bilateral leg swelling for the past day. The patient reports he was seen at [facility] at which time he was diagnosed with congestive heart failure and was advised to come to the ER for further evaluation. The patient reports he had midsternal chest pain while he was at  which has since resolved. The patient reports hx of CABG x 4 in 2001. The patient also reports a hx of COPD and combined systolic and diastolic congestive heart failure. The patient reports he is not compliant with his supplemental home oxygen that he was directed to wear 16 hours daily. The patient denies being a current or former smoker but states his son smokes. There are no other complaints at this time.    The patient reports his dry weight is 190 pounds. His current weight is 200 pounds.    Location: respiratory  Quality: shortness of breath  Intensity/Severity: moderate  Duration: two days  Onset quality: gradual  Timing: constant  Progression: not specified  Aggravating Factors: The patient denies orthopnea.   Alleviating Factors: none specified  Previous Episodes: The patient has a hx of COPD.  Treatment before arrival: The patient reports he was seen at [facility] at which time he was diagnosed with congestive heart failure and was advised to come to the ER for further evaluation.  Associated Symptoms: bilateral lower leg swelling, unexpected weight gain of 10 pounds, and midsternal chest pain (resolved)    PAST MEDICAL HISTORY  Active Ambulatory Problems      Diagnosis Date Noted   • Major depressive disorder with single episode, in partial remission (CMS/HCC)    • Other hyperlipidemia    • Chronic ischemic heart disease    • Vitamin D deficiency 12/17/2015   • Erectile dysfunction 12/17/2015   • Chronic fatigue 04/25/2016   • Type 2 diabetes mellitus with ophthalmic complication (CMS/HCC) 04/25/2016   • Skin lesion of chest wall 06/20/2016   • Slow transit constipation 09/01/2016   • Benign prostatic hyperplasia with nocturia 12/20/2016   • CKD (chronic kidney disease) stage 3, GFR 30-59 ml/min (CMS/HCC) 12/20/2016   • History of basal cell carcinoma 12/20/2016   • Essential hypertension 12/20/2016   • Acquired hypothyroidism 12/20/2017   • Cerebrovascular accident (CVA) due to embolism of precerebral artery (CMS/HCC) 02/20/2018   • Urinary retention 09/20/2018   • SYLVAIN (acute kidney injury) (CMS/HCC) 09/20/2018   • Pneumonia 09/21/2018   • Chronic combined systolic and diastolic congestive heart failure (CMS/HCC) 09/21/2018   • Acute respiratory failure with hypoxia (CMS/HCC) 09/21/2018   • Suspected sleep apnea 10/29/2018   • Pleural effusion 12/01/2018   • Chronic CHF (congestive heart failure) (CMS/HCC) 12/12/2018   • Snoring 12/13/2018   • YAW on CPAP 12/13/2018   • Coronary artery disease involving native coronary artery of native heart without angina pectoris 04/18/2019   • Coronary artery disease 04/23/2019     Resolved Ambulatory Problems     Diagnosis Date Noted   • Anemia    • Arthritis    • Diabetes mellitus out of control (CMS/HCC)    • Type 2 diabetes mellitus (CMS/HCC)    • Acute sinusitis    • Accumulation of fluid in tissues 12/17/2015   • Fatigue 12/17/2015   • Cardiomyopathy, ischemic 12/17/2015   • Acute appendicitis 03/02/2016   • Atherosclerosis of coronary artery 10/16/2012   • Altered mental status 11/30/2017   • Hypertensive urgency 02/16/2018   • Hypertensive encephalopathy syndrome 04/30/2018   • History of medication noncompliance  05/04/2018   • Hyperglycemia 05/04/2018   • Screening for colorectal cancer 08/22/2018   • Constipation 08/22/2018     Past Medical History:   Diagnosis Date   • Acquired hypothyroidism    • Acute congestive heart failure (CMS/HCC)    • Acute respiratory failure with hypoxia (CMS/HCC)    • Acute sinusitis    • SYLVAIN (acute kidney injury) (CMS/HCC)    • Anemia    • Arthritis    • CAD (coronary artery disease)    • Cancer (CMS/HCC)    • Chronic combined systolic and diastolic congestive heart failure (CMS/HCC)    • Chronic ischemic heart disease    • CKD (chronic kidney disease)    • Depression    • Diabetes mellitus type 2, uncontrolled, without complications (CMS/HCC)    • Disease of thyroid gland    • Fatigue    • Frequent PVCs    • Heart attack (CMS/HCC)    • History of coronary artery disease    • History of transfusion    • Hyperglycemia    • Hyperlipidemia    • Hypertension    • Hypertensive encephalopathy    • Mental status change    • Pleural effusion    • Pneumonia    • RBBB (right bundle branch block)    • Screening for prostate cancer 2011   • Stroke (CMS/HCC)    • TIA (transient ischemic attack)    • Type 2 diabetes mellitus (CMS/HCC)    • Urinary retention    • Vitamin D deficiency          PAST SURGICAL HISTORY  Past Surgical History:   Procedure Laterality Date   • APPENDECTOMY N/A 02/14/2016    Dr. Alexey Dodson   • COLONOSCOPY      over 20 years ago.  no polyps    • COLONOSCOPY N/A 9/18/2018    Procedure: COLONOSCOPY;  Surgeon: Jose Enrique Louie MD;  Location: [facility] ENDOSCOPY;  Service: Gastroenterology   • COLONOSCOPY N/A 9/19/2018    IH, EH, polyps (TAs w/low grade dysplasia)   • CORONARY ARTERY BYPASS GRAFT  11/2001   • TONSILLECTOMY     • VASECTOMY           FAMILY HISTORY  Family History   Problem Relation Age of Onset   • Diabetes Mother    • Hypertension Mother    • Macular degeneration Mother    • Alcohol abuse Father    • Cancer Father         lung   • Alcohol abuse Brother           SOCIAL HISTORY  Social History     Socioeconomic History   • Marital status:      Spouse name: Not on file   • Number of children: Not on file   • Years of education: Not on file   • Highest education level: Not on file   Tobacco Use   • Smoking status: Never Smoker   • Smokeless tobacco: Never Used   • Tobacco comment: daily caffiene   Substance and Sexual Activity   • Alcohol use: No   • Drug use: No   • Sexual activity: Defer         ALLERGIES  Fluoxetine and Prozac [fluoxetine hcl]        REVIEW OF SYSTEMS  Review of Systems   Constitutional: Positive for unexpected weight change (10 pound weight gain). Negative for fever.   HENT: Negative for sore throat.    Respiratory: Positive for shortness of breath.    Cardiovascular: Positive for chest pain (midsternal; resolved) and leg swelling (worsening of chronic).   Gastrointestinal: Negative for abdominal pain.   Endocrine: Negative for polyuria.   Genitourinary: Negative for dysuria.   Musculoskeletal: Negative for neck pain.   Skin: Negative for rash.   Neurological: Negative for headaches.   All other systems reviewed and are negative.           PHYSICAL EXAM  ED Triage Vitals [07/02/19 1757]   Temp Heart Rate Resp BP SpO2   97 °F (36.1 °C) 69 15 -- 91 %      Temp src Heart Rate Source Patient Position BP Location FiO2 (%)   -- -- -- -- --         GENERAL: not distressed  HENT: nares patent, JVD to the lower mandible  EYES: no scleral icterus  CV: regular rhythm, regular rate, S3  RESPIRATORY: normal effort, bibasilar crackles, 2+ radial pulses bilaterally  ABDOMEN: soft, nontender  MUSCULOSKELETAL: no deformity, 3+ edema bilaterally with left worse than right  NEURO: alert, LIGHT, FC  SKIN: warm, dry    Vital signs and nursing notes reviewed.          LAB RESULTS  Recent Results (from the past 24 hour(s))   Basic Metabolic Panel    Collection Time: 07/02/19  6:14 PM   Result Value Ref Range    Glucose 119 (H) 65 - 99 mg/dL    BUN 31 (H) 8 - 23  mg/dL    Creatinine 1.59 (H) 0.76 - 1.27 mg/dL    Sodium 146 (H) 136 - 145 mmol/L    Potassium 4.3 3.5 - 5.2 mmol/L    Chloride 111 (H) 98 - 107 mmol/L    CO2 22.8 22.0 - 29.0 mmol/L    Calcium 9.5 8.6 - 10.5 mg/dL    eGFR Non African Amer 44 (L) >60 mL/min/1.73    BUN/Creatinine Ratio 19.5 7.0 - 25.0    Anion Gap 12.2 5.0 - 15.0 mmol/L   BNP    Collection Time: 07/02/19  6:14 PM   Result Value Ref Range    proBNP 5,677.0 (H) 5.0 - 900.0 pg/mL   Troponin    Collection Time: 07/02/19  6:14 PM   Result Value Ref Range    Troponin T 0.077 (C) 0.000 - 0.030 ng/mL   Protime-INR    Collection Time: 07/02/19  6:14 PM   Result Value Ref Range    Protime 18.9 (H) 11.7 - 14.2 Seconds    INR 1.62 (H) 0.90 - 1.10   CBC Auto Differential    Collection Time: 07/02/19  6:14 PM   Result Value Ref Range    WBC 6.63 3.40 - 10.80 10*3/mm3    RBC 4.13 (L) 4.14 - 5.80 10*6/mm3    Hemoglobin 10.1 (L) 13.0 - 17.7 g/dL    Hematocrit 33.4 (L) 37.5 - 51.0 %    MCV 80.9 79.0 - 97.0 fL    MCH 24.5 (L) 26.6 - 33.0 pg    MCHC 30.2 (L) 31.5 - 35.7 g/dL    RDW 14.9 12.3 - 15.4 %    RDW-SD 43.7 37.0 - 54.0 fl    MPV 10.3 6.0 - 12.0 fL    Platelets 278 140 - 450 10*3/mm3    Neutrophil % 66.8 42.7 - 76.0 %    Lymphocyte % 20.8 19.6 - 45.3 %    Monocyte % 9.0 5.0 - 12.0 %    Eosinophil % 3.0 0.3 - 6.2 %    Basophil % 0.2 0.0 - 1.5 %    Immature Grans % 0.2 0.0 - 0.5 %    Neutrophils, Absolute 4.43 1.70 - 7.00 10*3/mm3    Lymphocytes, Absolute 1.38 0.70 - 3.10 10*3/mm3    Monocytes, Absolute 0.60 0.10 - 0.90 10*3/mm3    Eosinophils, Absolute 0.20 0.00 - 0.40 10*3/mm3    Basophils, Absolute 0.01 0.00 - 0.20 10*3/mm3    Immature Grans, Absolute 0.01 0.00 - 0.05 10*3/mm3    nRBC 0.0 0.0 - 0.2 /100 WBC       Ordered the above labs and reviewed the results.        RADIOLOGY  XR Chest 2 View   Final Result   Interval worsening, with increased opacities at the mid to   lower lungs, increased pleural effusions. Continued follow-up   recommended.       This  report was finalized on 7/2/2019 6:50 PM by Dr. Drake Dennis M.D.                   PROCEDURES  Procedures        EKG:           EKG time: 1817  Rhythm/Rate: SR, 66  P waves and AL: nml; nml  QRS, axis: RBBB  ST and T waves: nml; nml     Interpreted Contemporaneously by me, independently viewed  Unchanged compared to prior from 05/25/2019.              MEDICATIONS GIVEN IN ER  Medications   sodium chloride 0.9 % flush 10 mL (not administered)   furosemide (LASIX) injection 60 mg (60 mg Intravenous Given 7/2/19 1936)   aspirin chewable tablet 324 mg (324 mg Oral Given 7/2/19 1939)                   PROGRESS AND CONSULTS  ED Course as of Jul 02 1959 Tue Jul 02, 2019 1822 Read outside records from [facility].  He was diagnosed with heart failure.  I agree with this conclusion as he has notable lower extremity edema with an S3 and bibasilar crackles.  [TD]      ED Course User Index  [TD] Riki Ambrosio II, MD     1806 Ordered CXR, EKG, and blood work for further evaluation.    1908 Ordered Lasix to treat fluid retention. Also ordered 324 mg ASA as an anticoagulant. Placed call to [facility] for admission.    1949 Received a call from Dr. Awad ([facility]) and discussed pt's case. Dr. Awad agreed with plan to admit the patient to [facility] for further evaluation and management.      MEDICAL DECISION MAKING      MDM  Number of Diagnoses or Management Options  Acute on chronic congestive heart failure, unspecified heart failure type (CMS/HCC):   Shortness of breath:   Diagnosis management comments: Patient presents with CHF exacerbation.  Admit for IV diuresis.  Have extremely low suspicion for pulmonary embolism, ACS (troponin is at his baseline), thoracic aortic dissection, pneumonia.       Amount and/or Complexity of Data Reviewed  Clinical lab tests: ordered and reviewed (BNP: 5677)  Tests in the radiology section of CPT®: ordered and reviewed (Xr Chest 2 View:  Interval worsening, with increased opacities  at the mid to lower lungs, increased pleural effusions. Continued follow-up recommended.)  Tests in the medicine section of CPT®: reviewed and ordered (See procedure notes for EKG interpretation.)  Decide to obtain previous medical records or to obtain history from someone other than the patient: yes (Epic)  Review and summarize past medical records: yes (The patient was seen at [facility] at which time he was diagnosed with congestive heart failure and was advised to come to the ER for further evaluation.)  Discuss the patient with other providers: yes (Dr. Awad ([facility]))  Independent visualization of images, tracings, or specimens: yes    Patient Progress  Patient progress: stable             DIAGNOSIS  Final diagnoses:   Acute on chronic congestive heart failure, unspecified heart failure type (CMS/HCC)   Shortness of breath         DISPOSITION  ADMISSION TO [facility]    Discussed treatment plan and reason for admission with pt/family and admitting physician.  Pt/family voiced understanding of the plan for admission for further testing/treatment as needed.            Latest Documented Vital Signs:  As of 7:59 PM  BP- 171/98 HR- 64 Temp- 97 °F (36.1 °C) O2 sat- 93%        --  Documentation assistance provided by lida Apodaca for Dr. Hebert MD.  Information recorded by the scribe was done at my direction and has been verified and validated by me.       Nathaly Apodaca  07/02/19 1959       Riki Ambrosio II, MD  07/02/19 7145

## 2019-07-02 NOTE — ED NOTES
"Pt non compliant with home O2, states \"I am prescribed 16 hours a day to wear it, but I just cant\"       Jojo Al, RN  07/02/19 7234    "

## 2019-07-03 LAB
ALBUMIN SERPL-MCNC: 3.4 G/DL (ref 3.5–5.2)
ALBUMIN/GLOB SERPL: 1.4 G/DL
ALP SERPL-CCNC: 72 U/L (ref 39–117)
ALT SERPL W P-5'-P-CCNC: 17 U/L (ref 1–41)
ANION GAP SERPL CALCULATED.3IONS-SCNC: 11.1 MMOL/L (ref 5–15)
AST SERPL-CCNC: 15 U/L (ref 1–40)
B PARAPERT DNA SPEC QL NAA+PROBE: NOT DETECTED
B PERT DNA SPEC QL NAA+PROBE: NOT DETECTED
BASOPHILS # BLD AUTO: 0.01 10*3/MM3 (ref 0–0.2)
BASOPHILS NFR BLD AUTO: 0.2 % (ref 0–1.5)
BILIRUB SERPL-MCNC: 0.3 MG/DL (ref 0.2–1.2)
BUN BLD-MCNC: 31 MG/DL (ref 8–23)
BUN/CREAT SERPL: 18.9 (ref 7–25)
C PNEUM DNA NPH QL NAA+NON-PROBE: NOT DETECTED
CALCIUM SPEC-SCNC: 9 MG/DL (ref 8.6–10.5)
CHLORIDE SERPL-SCNC: 108 MMOL/L (ref 98–107)
CO2 SERPL-SCNC: 23.9 MMOL/L (ref 22–29)
CREAT BLD-MCNC: 1.64 MG/DL (ref 0.76–1.27)
DEPRECATED RDW RBC AUTO: 42.9 FL (ref 37–54)
EOSINOPHIL # BLD AUTO: 0.22 10*3/MM3 (ref 0–0.4)
EOSINOPHIL NFR BLD AUTO: 3.6 % (ref 0.3–6.2)
ERYTHROCYTE [DISTWIDTH] IN BLOOD BY AUTOMATED COUNT: 14.7 % (ref 12.3–15.4)
FLUAV H1 2009 PAND RNA NPH QL NAA+PROBE: NOT DETECTED
FLUAV H1 HA GENE NPH QL NAA+PROBE: NOT DETECTED
FLUAV H3 RNA NPH QL NAA+PROBE: NOT DETECTED
FLUAV SUBTYP SPEC NAA+PROBE: NOT DETECTED
FLUBV RNA ISLT QL NAA+PROBE: NOT DETECTED
GFR SERPL CREATININE-BSD FRML MDRD: 43 ML/MIN/1.73
GLOBULIN UR ELPH-MCNC: 2.4 GM/DL
GLUCOSE BLD-MCNC: 166 MG/DL (ref 65–99)
GLUCOSE BLDC GLUCOMTR-MCNC: 119 MG/DL (ref 70–130)
GLUCOSE BLDC GLUCOMTR-MCNC: 84 MG/DL (ref 70–130)
GLUCOSE BLDC GLUCOMTR-MCNC: 86 MG/DL (ref 70–130)
GLUCOSE BLDC GLUCOMTR-MCNC: 89 MG/DL (ref 70–130)
HADV DNA SPEC NAA+PROBE: NOT DETECTED
HBA1C MFR BLD: 8.3 % (ref 4.8–5.6)
HCOV 229E RNA SPEC QL NAA+PROBE: NOT DETECTED
HCOV HKU1 RNA SPEC QL NAA+PROBE: NOT DETECTED
HCOV NL63 RNA SPEC QL NAA+PROBE: NOT DETECTED
HCOV OC43 RNA SPEC QL NAA+PROBE: NOT DETECTED
HCT VFR BLD AUTO: 30.2 % (ref 37.5–51)
HGB BLD-MCNC: 9.2 G/DL (ref 13–17.7)
HMPV RNA NPH QL NAA+NON-PROBE: NOT DETECTED
HPIV1 RNA SPEC QL NAA+PROBE: NOT DETECTED
HPIV2 RNA SPEC QL NAA+PROBE: NOT DETECTED
HPIV3 RNA NPH QL NAA+PROBE: NOT DETECTED
HPIV4 P GENE NPH QL NAA+PROBE: NOT DETECTED
IMM GRANULOCYTES # BLD AUTO: 0.01 10*3/MM3 (ref 0–0.05)
IMM GRANULOCYTES NFR BLD AUTO: 0.2 % (ref 0–0.5)
INR PPP: 1.63 (ref 0.9–1.1)
INR PPP: 1.72 (ref 0.9–1.1)
LYMPHOCYTES # BLD AUTO: 1.2 10*3/MM3 (ref 0.7–3.1)
LYMPHOCYTES NFR BLD AUTO: 19.7 % (ref 19.6–45.3)
M PNEUMO IGG SER IA-ACNC: NOT DETECTED
MCH RBC QN AUTO: 24.7 PG (ref 26.6–33)
MCHC RBC AUTO-ENTMCNC: 30.5 G/DL (ref 31.5–35.7)
MCV RBC AUTO: 81 FL (ref 79–97)
MONOCYTES # BLD AUTO: 0.55 10*3/MM3 (ref 0.1–0.9)
MONOCYTES NFR BLD AUTO: 9 % (ref 5–12)
NEUTROPHILS # BLD AUTO: 4.11 10*3/MM3 (ref 1.7–7)
NEUTROPHILS NFR BLD AUTO: 67.3 % (ref 42.7–76)
NRBC BLD AUTO-RTO: 0 /100 WBC (ref 0–0.2)
PLATELET # BLD AUTO: 220 10*3/MM3 (ref 140–450)
PMV BLD AUTO: 10.3 FL (ref 6–12)
POTASSIUM BLD-SCNC: 3.8 MMOL/L (ref 3.5–5.2)
PROT SERPL-MCNC: 5.8 G/DL (ref 6–8.5)
PROTHROMBIN TIME: 19 SECONDS (ref 11.7–14.2)
PROTHROMBIN TIME: 19.9 SECONDS (ref 11.7–14.2)
RBC # BLD AUTO: 3.73 10*6/MM3 (ref 4.14–5.8)
RHINOVIRUS RNA SPEC NAA+PROBE: NOT DETECTED
RSV RNA NPH QL NAA+NON-PROBE: NOT DETECTED
SODIUM BLD-SCNC: 143 MMOL/L (ref 136–145)
WBC NRBC COR # BLD: 6.1 10*3/MM3 (ref 3.4–10.8)

## 2019-07-03 PROCEDURE — 83036 HEMOGLOBIN GLYCOSYLATED A1C: CPT | Performed by: INTERNAL MEDICINE

## 2019-07-03 PROCEDURE — 0100U HC BIOFIRE FILMARRAY RESP PANEL 2: CPT | Performed by: INTERNAL MEDICINE

## 2019-07-03 PROCEDURE — 82962 GLUCOSE BLOOD TEST: CPT

## 2019-07-03 PROCEDURE — 85025 COMPLETE CBC W/AUTO DIFF WBC: CPT | Performed by: INTERNAL MEDICINE

## 2019-07-03 PROCEDURE — 87581 M.PNEUMON DNA AMP PROBE: CPT | Performed by: INTERNAL MEDICINE

## 2019-07-03 PROCEDURE — 87798 DETECT AGENT NOS DNA AMP: CPT | Performed by: INTERNAL MEDICINE

## 2019-07-03 PROCEDURE — 87486 CHLMYD PNEUM DNA AMP PROBE: CPT | Performed by: INTERNAL MEDICINE

## 2019-07-03 PROCEDURE — 94799 UNLISTED PULMONARY SVC/PX: CPT

## 2019-07-03 PROCEDURE — 63710000001 INSULIN LISPRO (HUMAN) PER 5 UNITS: Performed by: INTERNAL MEDICINE

## 2019-07-03 PROCEDURE — 80053 COMPREHEN METABOLIC PANEL: CPT | Performed by: INTERNAL MEDICINE

## 2019-07-03 PROCEDURE — 63710000001 INSULIN GLARGINE PER 5 UNITS: Performed by: INTERNAL MEDICINE

## 2019-07-03 PROCEDURE — 85610 PROTHROMBIN TIME: CPT | Performed by: INTERNAL MEDICINE

## 2019-07-03 PROCEDURE — 94640 AIRWAY INHALATION TREATMENT: CPT

## 2019-07-03 PROCEDURE — 25010000002 FUROSEMIDE PER 20 MG: Performed by: INTERNAL MEDICINE

## 2019-07-03 PROCEDURE — 87633 RESP VIRUS 12-25 TARGETS: CPT | Performed by: INTERNAL MEDICINE

## 2019-07-03 RX ORDER — WARFARIN SODIUM 7.5 MG/1
7.5 TABLET ORAL
Status: DISCONTINUED | OUTPATIENT
Start: 2019-07-03 | End: 2019-07-05 | Stop reason: DRUGHIGH

## 2019-07-03 RX ADMIN — ASPIRIN 324 MG: 81 TABLET, CHEWABLE ORAL at 02:20

## 2019-07-03 RX ADMIN — IPRATROPIUM BROMIDE AND ALBUTEROL SULFATE 3 ML: 2.5; .5 SOLUTION RESPIRATORY (INHALATION) at 19:55

## 2019-07-03 RX ADMIN — INSULIN GLARGINE 14 UNITS: 100 INJECTION, SOLUTION SUBCUTANEOUS at 02:17

## 2019-07-03 RX ADMIN — ASPIRIN 81 MG: 81 TABLET, COATED ORAL at 06:23

## 2019-07-03 RX ADMIN — ATORVASTATIN CALCIUM 40 MG: 20 TABLET, FILM COATED ORAL at 02:19

## 2019-07-03 RX ADMIN — IPRATROPIUM BROMIDE AND ALBUTEROL SULFATE 3 ML: 2.5; .5 SOLUTION RESPIRATORY (INHALATION) at 11:05

## 2019-07-03 RX ADMIN — INSULIN GLARGINE 14 UNITS: 100 INJECTION, SOLUTION SUBCUTANEOUS at 22:03

## 2019-07-03 RX ADMIN — METOPROLOL SUCCINATE 25 MG: 25 TABLET, FILM COATED, EXTENDED RELEASE ORAL at 08:05

## 2019-07-03 RX ADMIN — INSULIN LISPRO 2 UNITS: 100 INJECTION, SOLUTION INTRAVENOUS; SUBCUTANEOUS at 07:13

## 2019-07-03 RX ADMIN — FUROSEMIDE 60 MG: 10 INJECTION, SOLUTION INTRAMUSCULAR; INTRAVENOUS at 17:40

## 2019-07-03 RX ADMIN — TAMSULOSIN HYDROCHLORIDE 0.4 MG: 0.4 CAPSULE ORAL at 22:03

## 2019-07-03 RX ADMIN — FERROUS SULFATE TAB 325 MG (65 MG ELEMENTAL FE) 325 MG: 325 (65 FE) TAB at 08:05

## 2019-07-03 RX ADMIN — AMLODIPINE BESYLATE 5 MG: 5 TABLET ORAL at 08:05

## 2019-07-03 RX ADMIN — LEVOTHYROXINE SODIUM 75 MCG: 75 TABLET ORAL at 06:23

## 2019-07-03 RX ADMIN — IPRATROPIUM BROMIDE AND ALBUTEROL SULFATE 3 ML: 2.5; .5 SOLUTION RESPIRATORY (INHALATION) at 15:44

## 2019-07-03 RX ADMIN — TAMSULOSIN HYDROCHLORIDE 0.4 MG: 0.4 CAPSULE ORAL at 02:19

## 2019-07-03 RX ADMIN — ATORVASTATIN CALCIUM 40 MG: 20 TABLET, FILM COATED ORAL at 22:03

## 2019-07-03 RX ADMIN — WARFARIN SODIUM 7.5 MG: 7.5 TABLET ORAL at 17:40

## 2019-07-03 RX ADMIN — WARFARIN SODIUM 6.5 MG: 2.5 TABLET ORAL at 02:19

## 2019-07-03 RX ADMIN — VITAMIN D, TAB 1000IU (100/BT) 1000 UNITS: 25 TAB at 08:05

## 2019-07-03 RX ADMIN — FUROSEMIDE 60 MG: 10 INJECTION, SOLUTION INTRAMUSCULAR; INTRAVENOUS at 08:05

## 2019-07-03 RX ADMIN — INSULIN LISPRO 2 UNITS: 100 INJECTION, SOLUTION INTRAVENOUS; SUBCUTANEOUS at 11:25

## 2019-07-03 RX ADMIN — SODIUM CHLORIDE, PRESERVATIVE FREE 3 ML: 5 INJECTION INTRAVENOUS at 08:05

## 2019-07-03 RX ADMIN — IPRATROPIUM BROMIDE AND ALBUTEROL SULFATE 3 ML: 2.5; .5 SOLUTION RESPIRATORY (INHALATION) at 08:15

## 2019-07-03 RX ADMIN — LOSARTAN POTASSIUM 50 MG: 50 TABLET, FILM COATED ORAL at 08:05

## 2019-07-03 RX ADMIN — SODIUM CHLORIDE, PRESERVATIVE FREE 3 ML: 5 INJECTION INTRAVENOUS at 22:03

## 2019-07-03 RX ADMIN — BUPROPION HYDROCHLORIDE 150 MG: 150 TABLET, FILM COATED, EXTENDED RELEASE ORAL at 08:05

## 2019-07-03 RX ADMIN — INSULIN LISPRO 2 UNITS: 100 INJECTION, SOLUTION INTRAVENOUS; SUBCUTANEOUS at 17:40

## 2019-07-03 NOTE — H&P
Internal medicine history and physical  INTERNAL MEDICINE   [facility]       Patient Identification:  Name: Carlito Mo  Age: 64 y.o.  Sex: male  :  1955  MRN: [id number]                   Primary Care Physician: Jyoti Reynolds MD                                   Chief Complaint: Increasing swelling of the legs and shortness of breath worse over the course of last week.    History of Present Illness:   Patient is a 64-year-old male with complicated past medical history has been noticing progressive weight gain of 10 pounds over the course of last 1 month, history of shortness of breath over the course of last 2 weeks and increasing swelling of his legs over the course of last 7 days has been having significant worsening in the last 2 days resulting in him being brought to the emergency room after he was advised by the [facility] earlier as it was thought that he was having exacerbation of underlying congestive heart failure.  Patient also had an episode of chest discomfort that lasted for a few minutes and went away and currently does not have any symptoms of chest pain.  Patient is a nonchalant vague historian and requires lots of effort to get the account of his symptoms from him.  Last week or so has been complaining of chills and has been having sore throat for a day or so.      Past Medical History:  Past Medical History:   Diagnosis Date   • Acquired hypothyroidism    • Acute congestive heart failure (CMS/HCC)    • Acute respiratory failure with hypoxia (CMS/HCC)    • Acute sinusitis    • SYLVAIN (acute kidney injury) (CMS/HCC)     on CKD   • Anemia    • Arthritis    • CAD (coronary artery disease)    • Cancer (CMS/HCC)     skin   • Chronic combined systolic and diastolic congestive heart failure (CMS/HCC)    • Chronic ischemic heart disease    • CKD (chronic kidney disease)    • COPD (chronic obstructive pulmonary disease) (CMS/HCC)    • Depression    • Diabetes  mellitus type 2, uncontrolled, without complications (CMS/HCC)    • Disease of thyroid gland    • Fatigue    • Frequent PVCs    • Heart attack (CMS/HCC)    • History of coronary artery disease     with remote history of bypass surgery in 2001   • History of transfusion    • Hyperglycemia    • Hyperlipidemia    • Hypertension    • Hypertensive encephalopathy    • Mental status change     Acute   • Pleural effusion    • Pneumonia    • RBBB (right bundle branch block)    • Screening for prostate cancer 2011   • Stroke (CMS/HCC)    • TIA (transient ischemic attack)    • Type 2 diabetes mellitus (CMS/HCC)    • Urinary retention    • Vitamin D deficiency      Past Surgical History:  Past Surgical History:   Procedure Laterality Date   • APPENDECTOMY N/A 02/14/2016    Dr. Alexey Dodson   • COLONOSCOPY      over 20 years ago.  no polyps    • COLONOSCOPY N/A 9/18/2018    Procedure: COLONOSCOPY;  Surgeon: Jose Enrique Louie MD;  Location: [facility] ENDOSCOPY;  Service: Gastroenterology   • COLONOSCOPY N/A 9/19/2018    IH, EH, polyps (TAs w/low grade dysplasia)   • CORONARY ARTERY BYPASS GRAFT  11/2001   • TONSILLECTOMY     • VASECTOMY        Home Meds:  Medications Prior to Admission   Medication Sig Dispense Refill Last Dose   • amLODIPine (NORVASC) 5 MG tablet Take 5 mg by mouth Daily.      • aspirin 81 MG EC tablet Take 81 mg by mouth Every Morning.   Taking   • atorvastatin (LIPITOR) 40 MG tablet Take 40 mg by mouth Every Night.   Taking   • buPROPion XL (WELLBUTRIN XL) 150 MG 24 hr tablet Take 150 mg by mouth Daily.   Taking   • Cholecalciferol (VITAMIN D3) 5000 units capsule capsule Take 5,000 Units by mouth Daily.   Taking   • ferrous sulfate 325 (65 FE) MG tablet Take 325 mg by mouth Daily With Breakfast.      • furosemide (LASIX) 40 MG tablet Take 0.5 tablets by mouth Daily.   Taking   • Insulin Glargine (BASAGLAR KWIKPEN) 100 UNIT/ML injection pen Inject 14 Units under the skin into the appropriate area as  directed Every Night. Patient says he does not take consistently   Taking   • insulin glulisine (APIDRA) 100 UNIT/ML injection Inject 2 Units under the skin into the appropriate area as directed 3 (Three) Times a Day Before Meals. Patient says he does not take consistently   Taking   • ipratropium-albuterol (COMBIVENT RESPIMAT)  MCG/ACT inhaler Inhale 1 puff 4 (Four) Times a Day As Needed for Wheezing.      • levothyroxine (SYNTHROID, LEVOTHROID) 75 MCG tablet Take 75 mcg by mouth Daily.   Taking   • losartan (COZAAR) 50 MG tablet Take 1 tablet by mouth Daily. 30 tablet 11 Taking   • metoprolol succinate XL (TOPROL-XL) 25 MG 24 hr tablet Take 1 tablet by mouth Daily. 30 tablet 11 Taking   • tamsulosin (FLOMAX) 0.4 MG capsule 24 hr capsule Take 1 capsule by mouth Every Night.   Taking   • warfarin (COUMADIN) 2 MG tablet Take 6 mg by mouth Daily.   7/2/2019 at 1400     Current Meds:     Current Facility-Administered Medications:   •  [COMPLETED] Insert peripheral IV, , , Once **AND** sodium chloride 0.9 % flush 10 mL, 10 mL, Intravenous, PRN, Riki Ambrosio II, MD, 10 mL at 07/02/19 5040  Allergies:  Allergies   Allergen Reactions   • Fluoxetine Other (See Comments)   • Prozac [Fluoxetine Hcl] Other (See Comments)     shaking     Social History:   Social History     Tobacco Use   • Smoking status: Never Smoker   • Smokeless tobacco: Never Used   • Tobacco comment: daily caffiene   Substance Use Topics   • Alcohol use: No      Family History:  Family History   Problem Relation Age of Onset   • Diabetes Mother    • Hypertension Mother    • Macular degeneration Mother    • Alcohol abuse Father    • Cancer Father         lung   • Alcohol abuse Brother           Review of Systems  See history of present illness and past medical history.   Constitutional: Positive for unexpected weight change (10 pound weight gain). Negative for fever.   HENT: Negative for sore throat.    Respiratory: Positive for shortness of  "breath.    Cardiovascular: Positive for chest pain (midsternal; resolved) and leg swelling (worsening of chronic).   Gastrointestinal: Negative for abdominal pain.   Endocrine: Negative for polyuria.   Genitourinary: Negative for dysuria.   Musculoskeletal: Negative for neck pain.   Skin: Negative for rash.   Neurological: Negative for headaches.   All other systems reviewed and are negative.        Vitals:   /98 (BP Location: Left arm, Patient Position: Lying)   Pulse 88   Temp 97.8 °F (36.6 °C) (Oral)   Resp 20   Ht 182.9 cm (72\")   Wt 86.4 kg (190 lb 8 oz)   SpO2 90%   BMI 25.84 kg/m²   I/O:     Intake/Output Summary (Last 24 hours) at 7/2/2019 9135  Last data filed at 7/2/2019 2138  Gross per 24 hour   Intake --   Output 200 ml   Net -200 ml     Exam:  General Appearance:    Alert, cooperative, no distress, appears stated age and currently does not appear to be in any acute distress.   Head:    Normocephalic, without obvious abnormality, atraumatic   Eyes:    PERRL, conjunctiva/corneas clear, EOM's intact, both eyes   Ears:    Normal external ear canals, both ears   Nose:   Nares normal, septum midline, mucosa normal, no drainage    or sinus tenderness   Throat:   Lips, tongue, gums normal; oral mucosa pink and moist   Neck:   Supple, symmetrical, trachea midline, no adenopathy;     thyroid:  no enlargement/tenderness/nodules; no carotid    bruit or JVD   Back:     Symmetric, no curvature, ROM normal, no CVA tenderness   Lungs:    Bibasilar crackles no use of accessory muscles of breathing noted scattered rhonchi anteriorly   Chest Wall:    No tenderness or deformity    Heart:    Regular rate and rhythm, S1 and S2 normal, no murmur, rub   or gallop   Abdomen:     Soft, non-tender, bowel sounds active all four quadrants,     no masses, no hepatomegaly, no splenomegaly   Extremities:  Bilateral lower extremity edema   Pulses:   Pulses palpable in all extremities; symmetric all extremities   Skin:   " Skin color normal, Skin is warm and dry,  no rashes or palpable lesions   Neurologic:   CNII-XII intact, motor strength grossly intact, sensation grossly intact to light touch, no focal deficits noted       Data Review:      I reviewed the patient's new clinical results.  Results from last 7 days   Lab Units 07/02/19  1814   WBC 10*3/mm3 6.63   HEMOGLOBIN g/dL 10.1*   PLATELETS 10*3/mm3 278     Results from last 7 days   Lab Units 07/02/19  1814   SODIUM mmol/L 146*   POTASSIUM mmol/L 4.3   CHLORIDE mmol/L 111*   CO2 mmol/L 22.8   BUN mg/dL 31*   CREATININE mg/dL 1.59*   CALCIUM mg/dL 9.5   GLUCOSE mg/dL 119*     Xr Chest 2 View    Result Date: 7/2/2019  Interval worsening, with increased opacities at the mid to lower lungs, increased pleural effusions. Continued follow-up recommended.  This report was finalized on 7/2/2019 6:50 PM by Dr. Drake Dennis M.D.      ECG 12 Lead   Preliminary Result   HEART RATE= 66  bpm   RR Interval= 908  ms   FL Interval= 189  ms   P Horizontal Axis= -9  deg   P Front Axis= 2  deg   QRSD Interval= 140  ms   QT Interval= 444  ms   QRS Axis= 162  deg   T Wave Axis= 29  deg   - ABNORMAL ECG -   Sinus rhythm   Nonspecific intraventricular conduction delay   Inferior infarct, old   Anteroseptal infarct, age indeterminate   Electronically Signed By:    Date and Time of Study: 2019-07-02 18:17:53        Troponin 0 0.077  BNP 5677  Assessment:  Active Hospital Problems    Diagnosis POA   • **Acute on chronic congestive heart failure (CMS/HCC) [I50.9] Yes   • Elevated troponin [R74.8] Unknown   • YAW on CPAP [G47.33, Z99.89] Not Applicable   • Acute respiratory failure with hypoxia (CMS/[facility]) [J96.01] Yes   • Chronic combined systolic and diastolic congestive heart failure (CMS/HCC) [I50.42] Yes   • SYLVAIN (acute kidney injury) (CMS/[facility]) [N17.9] Yes   • Acquired hypothyroidism [E03.9] Yes   • Benign prostatic hyperplasia with nocturia [N40.1, R35.1] Yes     0-1x/night     • CKD (chronic  kidney disease) stage 3, GFR 30-59 ml/min (CMS/[facility]) [N18.3] Yes   • Type 2 diabetes mellitus with ophthalmic complication (CMS/[facility]) [E11.39] Yes   • Chronic ischemic heart disease [I25.9] Yes       Medical decision making:  Acute exacerbation of chronic combined systolic/diastolic congestive heart failure manifested his weight gain, lower extremity edema and increasing shortness of breath.  Plan is to admit the patient provide him with oxygen IV diuretics Daily weight and consider cardiology evaluation.  Systemic subjective chills with sore throat-rule out systemic respiratory viral illness contributing to this exacerbation check respiratory viral panel.  Mild chest pain with indeterminate elevation of troponin-plan is to check serial troponin and cardiology evaluation.  COPD with hypoxia and CPAP but patient appears and claims that he is not completely compliant with this recommendation and does not use oxygen as he is supposed to use his CPAP as he supposed to.  Family is concerned about his ongoing smoking though patient denies it.  Plan is to monitor his oxygen saturation provide him with as needed nebulizer treatment.  Diabetes mellitus-continue his home regimen provided with Accu-Cheks and sliding scale coverage.  Hypothyroidism-continues thyroid replacement therapy.  Chronic kidney disease-monitor his renal function avoid nephrotoxic agent and hypotensive episodes.      Tamara Awad MD   7/2/2019  11:24 PM  Much of this encounter note is an electronic transcription/translation of spoken language to printed text. The electronic translation of spoken language may permit erroneous, or at times, nonsensical words or phrases to be inadvertently transcribed; Although I have reviewed the note for such errors, some may still exist

## 2019-07-03 NOTE — PROGRESS NOTES
Discharge Planning Assessment  [facility]     Patient Name: Carlito Mo  MRN: [id number]  Today's Date: 7/3/2019    Admit Date: 7/2/2019    Discharge Needs Assessment     Row Name 07/03/19 7751       Living Environment    Lives With  spouse    Current Living Arrangements  home/apartment/condo    Primary Care Provided by  self    Provides Primary Care For  no one    Family Caregiver if Needed  spouse    Family Caregiver Names  spouse, Lissette Mo, [phone]       Resource/Environmental Concerns    Resource/Environmental Concerns  none    Transportation Concerns  car, none       Transition Planning    Patient/Family Anticipates Transition to  home with family    Patient/Family Anticipated Services at Transition  none    Transportation Anticipated  family or friend will provide       Discharge Needs Assessment    Readmission Within the Last 30 Days  no previous admission in last 30 days    Concerns to be Addressed  no discharge needs identified;denies needs/concerns at this time    Equipment Currently Used at Home  bipap/cpap;glucometer;oxygen    Anticipated Changes Related to Illness  none    Equipment Needed After Discharge  none    Offered/Gave Vendor List  yes        Discharge Plan     Row Name 07/03/19 7638       Plan    Plan  Home with spouse    Patient/Family in Agreement with Plan  yes    Plan Comments  Facesheet information was verified.  Patient lives with his spouse, Lissette oM, [phone], in a house and is IADLs with use of his home O2.  [facility] provides his DME of oxygen and CPAP.  He also uses a glucometer.  He prefers [facility] for his medications and his PCP is documented in EPIC.  He has had no history of rehab.  He plans home at [place] and his spouse will transport..........................Venessa Pacheco RN        Destination      No service coordination in this encounter.      Durable Medical Equipment      No service coordination in this encounter.      Dialysis/Infusion      No service coordination  in this encounter.      Home Medical Care      No service coordination in this encounter.      Therapy      No service coordination in this encounter.      Community Resources      No service coordination in this encounter.          Demographic Summary     Row Name 07/03/19 1721       General Information    Admission Type  inpatient    Arrived From  home    Referral Source  admission list    Preferred Language  English       Contact Information    Permission Granted to Share Info With  family/designee;    Contact Information Comments  spouse, Lissette Mo, [phone]        Functional Status     Row Name 07/03/19 1722       Functional Status    Usual Activity Tolerance  good    Current Activity Tolerance  moderate       Functional Status, IADL    Medications  independent    Meal Preparation  independent    Housekeeping  independent    Laundry  independent    Shopping  independent       Mental Status    General Appearance WDL  WDL       Mental Status Summary    Recent Changes in Mental Status/Cognitive Functioning  no changes       Employment/    Employment Status  retired        Psychosocial    No documentation.       Abuse/Neglect    No documentation.       Legal    No documentation.       Substance Abuse    No documentation.       Patient Forms    No documentation.           Venessa Pacheco RN

## 2019-07-03 NOTE — PROGRESS NOTES
Clinical Pharmacy Services: Medication History    Carlito Mo is a 64 y.o. male presenting to [facility] for   Chief Complaint   Patient presents with   • Shortness of Breath       He  has a past medical history of Acquired hypothyroidism, Acute congestive heart failure (CMS/HCC), Acute respiratory failure with hypoxia (CMS/[facility]), Acute sinusitis, SYLVAIN (acute kidney injury) (CMS/[facility]), Anemia, Arthritis, CAD (coronary artery disease), Cancer (CMS/[facility]), Chronic combined systolic and diastolic congestive heart failure (CMS/[facility]), Chronic ischemic heart disease, CKD (chronic kidney disease), Depression, Diabetes mellitus type 2, uncontrolled, without complications (CMS/[facility]), Disease of thyroid gland, Fatigue, Frequent PVCs, Heart attack (CMS/[facility]), History of coronary artery disease, History of transfusion, Hyperglycemia, Hyperlipidemia, Hypertension, Hypertensive encephalopathy, Mental status change, Pleural effusion, Pneumonia, RBBB (right bundle branch block), Screening for prostate cancer (2011), Stroke (CMS/[facility]), TIA (transient ischemic attack), Type 2 diabetes mellitus (CMS/[facility]), Urinary retention, and Vitamin D deficiency.    Allergies as of 07/02/2019 - Reviewed 07/02/2019   Allergen Reaction Noted   • Fluoxetine Other (See Comments) 03/12/2018   • Prozac [fluoxetine hcl] Other (See Comments) 12/17/2015       Medication information was obtained from: Patient Medication Pack  Pharmacy and Phone Number: [facility] [phone]    Prior to Admission Medications     Prescriptions Last Dose Informant Patient Reported? Taking?    amLODIPine (NORVASC) 5 MG tablet  Medication Bottle Yes Yes    Take 5 mg by mouth Daily.    aspirin 81 MG EC tablet  Medication Bottle Yes Yes    Take 81 mg by mouth Every Morning.    atorvastatin (LIPITOR) 40 MG tablet  Medication Bottle Yes Yes    Take 40 mg by mouth Every Night.    buPROPion XL (WELLBUTRIN XL) 150 MG 24 hr tablet  Medication Bottle Yes Yes    Take 150 mg by  mouth Daily.    Cholecalciferol (VITAMIN D3) 5000 units capsule capsule  Medication Bottle Yes Yes    Take 5,000 Units by mouth Daily.    ferrous sulfate 325 (65 FE) MG tablet  Medication Bottle Yes Yes    Take 325 mg by mouth Daily With Breakfast.    furosemide (LASIX) 40 MG tablet  Medication Bottle No Yes    Take 0.5 tablets by mouth Daily.    Insulin Glargine (BASAGLAR KWIKPEN) 100 UNIT/ML injection pen  Medication Bottle Yes Yes    Inject 14 Units under the skin into the appropriate area as directed Every Night. Patient says he does not take consistently    insulin glulisine (APIDRA) 100 UNIT/ML injection  Medication Bottle Yes Yes    Inject 2 Units under the skin into the appropriate area as directed 3 (Three) Times a Day Before Meals. Patient says he does not take consistently    ipratropium-albuterol (COMBIVENT RESPIMAT)  MCG/ACT inhaler  Self Yes Yes    Inhale 1 puff 4 (Four) Times a Day As Needed for Wheezing.    levothyroxine (SYNTHROID, LEVOTHROID) 75 MCG tablet  Medication Bottle Yes Yes    Take 75 mcg by mouth Daily.    losartan (COZAAR) 50 MG tablet  Medication Bottle No Yes    Take 1 tablet by mouth Daily.    metoprolol succinate XL (TOPROL-XL) 25 MG 24 hr tablet  Medication Bottle No Yes    Take 1 tablet by mouth Daily.    tamsulosin (FLOMAX) 0.4 MG capsule 24 hr capsule  Medication Bottle Yes Yes    Take 1 capsule by mouth Every Night.    warfarin (COUMADIN) 2 MG tablet  Medication Bottle Yes Yes    Take 6 mg by mouth Daily.            Medication notes: Removed: docusate, potassium chloride per patient. No longer taking.   Added: Amlodipine, Ferrous Sulfate, and Combivent (per patient and medication bottles brought in). Patient reported he is not consistent with insulin because he doesn't like injections.       This medication list is complete to the best of my knowledge as of 7/2/2019    Please call if questions.    Marely Melendez, Medication History Technician  7/2/2019 9:33 PM

## 2019-07-03 NOTE — PLAN OF CARE
Problem: Patient Care Overview  Goal: Plan of Care Review  Outcome: Ongoing (interventions implemented as appropriate)   07/03/19 9169   Coping/Psychosocial   Plan of Care Reviewed With patient;spouse   Plan of Care Review   Progress improving   OTHER   Outcome Summary remains on 2L oxygen per baseline for patient. denies pain. good UO w/IV lasix. cardiology consulted. VSS. family at bedside. will continue to monitor--CHRISTIAN Lindsey RN     Goal: Individualization and Mutuality  Outcome: Ongoing (interventions implemented as appropriate)    Goal: Discharge Needs Assessment  Outcome: Ongoing (interventions implemented as appropriate)    Goal: Interprofessional Rounds/Family Conf  Outcome: Ongoing (interventions implemented as appropriate)      Problem: Fall Risk (Adult)  Goal: Identify Related Risk Factors and Signs and Symptoms  Outcome: Ongoing (interventions implemented as appropriate)    Goal: Absence of Fall  Outcome: Ongoing (interventions implemented as appropriate)

## 2019-07-03 NOTE — PROGRESS NOTES
Pharmacy Consult: Warfarin Dosing/ Monitoring    Carlito Mo is a 64 y.o. male, estimated creatinine clearance is 57.4 mL/min (A) (by C-G formula based on SCr of 1.59 mg/dL (H)). weighing 86.4 kg (190 lb 8 oz).     has a past medical history of Acquired hypothyroidism, Acute congestive heart failure (CMS/[facility]), Acute respiratory failure with hypoxia (CMS/[facility]), Acute sinusitis, SYLVAIN (acute kidney injury) (CMS/[facility]), Anemia, Arthritis, CAD (coronary artery disease), Cancer (CMS/[facility]), Chronic combined systolic and diastolic congestive heart failure (CMS/[facility]), Chronic ischemic heart disease, CKD (chronic kidney disease), COPD (chronic obstructive pulmonary disease) (CMS/[facility]), Depression, Diabetes mellitus type 2, uncontrolled, without complications (CMS/[facility]), Disease of thyroid gland, Fatigue, Frequent PVCs, Heart attack (CMS/[facility]), History of coronary artery disease, History of transfusion, Hyperglycemia, Hyperlipidemia, Hypertension, Hypertensive encephalopathy, Mental status change, Pleural effusion, Pneumonia, RBBB (right bundle branch block), Screening for prostate cancer (2011), Stroke (CMS/[facility]), TIA (transient ischemic attack), Type 2 diabetes mellitus (CMS/[facility]), Urinary retention, and Vitamin D deficiency.    Social History     Tobacco Use   • Smoking status: Never Smoker   • Smokeless tobacco: Never Used   • Tobacco comment: daily caffiene   Substance Use Topics   • Alcohol use: No   • Drug use: No       Results from last 7 days   Lab Units 07/02/19  1814   INR  1.62*   HEMOGLOBIN g/dL 10.1*   HEMATOCRIT % 33.4*   PLATELETS 10*3/mm3 278     Results from last 7 days   Lab Units 07/02/19  1814   SODIUM mmol/L 146*   POTASSIUM mmol/L 4.3   CHLORIDE mmol/L 111*   CO2 mmol/L 22.8   BUN mg/dL 31*   CREATININE mg/dL 1.59*   CALCIUM mg/dL 9.5   GLUCOSE mg/dL 119*     Anticoagulation history: Coumadin 6 mg po daily    Hospital Anticoagulation:  Consulting provider: Tamara Awad  Start date: 7/2/19  Indication: A Fib  Target  INR: 2-3  Expected duration:     Bridge Therapy:                 Date 6/3/19 6/20 7/2          INR 1.8 2.4 1.62          Warfarin dose               Potential drug interactions:      Relevant nutrition status:      Other:      Education complete?/ Date:      Assessment/Plan:  Dose : Increase to 6.5 mg po daily  Monitor INR daily  Follow up      Pharmacy will continue to follow until discharge or discontinuation of warfarin.   Damien Stubbs [facility]  7/2/2019

## 2019-07-03 NOTE — PAYOR COMM NOTE
"Carlito Sanchez (64 y.o. Male)                             ATTENTION; AUTH PENDING [id number] , CLINICAL FOR NURSE REVIEW,                         FACILITY NPI [id number], REPLY TO UR DEPT, RADHAMES VARELA [facility]  OR                         UR  [phone]         Date of Birth Social Security Number Address Home Phone MRN    1955  [id number] [facility] [address] [phone] [id number]    Yazidi Marital Status          Religious        Admission Date Admission Type Admitting Provider Attending Provider Department, Room/Bed    19 Emergency Tamara Awad MD Broaddus, Emmett J., MD [address], [address]/    Discharge Date Discharge Disposition Discharge Destination                       Attending Provider:  Flo Sherman MD    Allergies:  Fluoxetine, Prozac [Fluoxetine Hcl]    Isolation:  None   Infection:  None   Code Status:  CPR    Ht:  182.9 cm (72\")   Wt:  85.5 kg (188 lb 9.6 oz)    Admission Cmt:  None   Principal Problem:  Acute on chronic congestive heart failure (CMS/HCC) [I50.9]                 Active Insurance as of 2019     Primary Coverage     Payor Plan Insurance Group Employer/Plan Group    ANTHEM MEDICAID ANTHEM MEDICAID [id number]     Payor Plan Address Payor Plan Phone Number Payor Plan Fax Number Effective Dates    [address] [address] [phone]  2018 - None Entered    [facility] [id number]       Subscriber Name Subscriber Birth Date Member ID       CARLITO SANCHEZ 1955 [id number]                 Emergency Contacts      (Rel.) Home Phone Work Phone Mobile Phone    Lissette Sanchez (Spouse) [phone] -- --               History & Physical      Tamara Awad MD at 2019 11:24 PM          Internal medicine history and physical  INTERNAL MEDICINE   [facility]       Patient Identification:  Name: Carlito Sanchez  Age: 64 y.o.  Sex: male  :  1955  MRN: [id number]                   Primary Care " Physician: Jyoti Reynolds MD                                   Chief Complaint: Increasing swelling of the legs and shortness of breath worse over the course of last week.    History of Present Illness:   Patient is a 64-year-old male with complicated past medical history has been noticing progressive weight gain of 10 pounds over the course of last 1 month, history of shortness of breath over the course of last 2 weeks and increasing swelling of his legs over the course of last 7 days has been having significant worsening in the last 2 days resulting in him being brought to the emergency room after he was advised by the [facility] earlier as it was thought that he was having exacerbation of underlying congestive heart failure.  Patient also had an episode of chest discomfort that lasted for a few minutes and went away and currently does not have any symptoms of chest pain.  Patient is a nonchalant vague historian and requires lots of effort to get the account of his symptoms from him.  Last week or so has been complaining of chills and has been having sore throat for a day or so.      Past Medical History:  Past Medical History:   Diagnosis Date   • Acquired hypothyroidism    • Acute congestive heart failure (CMS/HCC)    • Acute respiratory failure with hypoxia (CMS/HCC)    • Acute sinusitis    • SYLVAIN (acute kidney injury) (CMS/HCC)     on CKD   • Anemia    • Arthritis    • CAD (coronary artery disease)    • Cancer (CMS/HCC)     skin   • Chronic combined systolic and diastolic congestive heart failure (CMS/HCC)    • Chronic ischemic heart disease    • CKD (chronic kidney disease)    • COPD (chronic obstructive pulmonary disease) (CMS/HCC)    • Depression    • Diabetes mellitus type 2, uncontrolled, without complications (CMS/HCC)    • Disease of thyroid gland    • Fatigue    • Frequent PVCs    • Heart attack (CMS/HCC)    • History of coronary artery disease     with remote history of bypass  surgery in 2001   • History of transfusion    • Hyperglycemia    • Hyperlipidemia    • Hypertension    • Hypertensive encephalopathy    • Mental status change     Acute   • Pleural effusion    • Pneumonia    • RBBB (right bundle branch block)    • Screening for prostate cancer 2011   • Stroke (CMS/HCC)    • TIA (transient ischemic attack)    • Type 2 diabetes mellitus (CMS/HCC)    • Urinary retention    • Vitamin D deficiency      Past Surgical History:  Past Surgical History:   Procedure Laterality Date   • APPENDECTOMY N/A 02/14/2016    Dr. Alexey Dodson   • COLONOSCOPY      over 20 years ago.  no polyps    • COLONOSCOPY N/A 9/18/2018    Procedure: COLONOSCOPY;  Surgeon: Jose Enrique Louie MD;  Location: [facility] ENDOSCOPY;  Service: Gastroenterology   • COLONOSCOPY N/A 9/19/2018    IH, EH, polyps (TAs w/low grade dysplasia)   • CORONARY ARTERY BYPASS GRAFT  11/2001   • TONSILLECTOMY     • VASECTOMY        Home Meds:  Medications Prior to Admission   Medication Sig Dispense Refill Last Dose   • amLODIPine (NORVASC) 5 MG tablet Take 5 mg by mouth Daily.      • aspirin 81 MG EC tablet Take 81 mg by mouth Every Morning.   Taking   • atorvastatin (LIPITOR) 40 MG tablet Take 40 mg by mouth Every Night.   Taking   • buPROPion XL (WELLBUTRIN XL) 150 MG 24 hr tablet Take 150 mg by mouth Daily.   Taking   • Cholecalciferol (VITAMIN D3) 5000 units capsule capsule Take 5,000 Units by mouth Daily.   Taking   • ferrous sulfate 325 (65 FE) MG tablet Take 325 mg by mouth Daily With Breakfast.      • furosemide (LASIX) 40 MG tablet Take 0.5 tablets by mouth Daily.   Taking   • Insulin Glargine (BASAGLAR KWIKPEN) 100 UNIT/ML injection pen Inject 14 Units under the skin into the appropriate area as directed Every Night. Patient says he does not take consistently   Taking   • insulin glulisine (APIDRA) 100 UNIT/ML injection Inject 2 Units under the skin into the appropriate area as directed 3 (Three) Times a Day Before Meals.  Patient says he does not take consistently   Taking   • ipratropium-albuterol (COMBIVENT RESPIMAT)  MCG/ACT inhaler Inhale 1 puff 4 (Four) Times a Day As Needed for Wheezing.      • levothyroxine (SYNTHROID, LEVOTHROID) 75 MCG tablet Take 75 mcg by mouth Daily.   Taking   • losartan (COZAAR) 50 MG tablet Take 1 tablet by mouth Daily. 30 tablet 11 Taking   • metoprolol succinate XL (TOPROL-XL) 25 MG 24 hr tablet Take 1 tablet by mouth Daily. 30 tablet 11 Taking   • tamsulosin (FLOMAX) 0.4 MG capsule 24 hr capsule Take 1 capsule by mouth Every Night.   Taking   • warfarin (COUMADIN) 2 MG tablet Take 6 mg by mouth Daily.   7/2/2019 at 1400     Current Meds:     Current Facility-Administered Medications:   •  [COMPLETED] Insert peripheral IV, , , Once **AND** sodium chloride 0.9 % flush 10 mL, 10 mL, Intravenous, PRN, Riki Ambrosio II, MD, 10 mL at 07/02/19 6440  Allergies:  Allergies   Allergen Reactions   • Fluoxetine Other (See Comments)   • Prozac [Fluoxetine Hcl] Other (See Comments)     shaking     Social History:   Social History     Tobacco Use   • Smoking status: Never Smoker   • Smokeless tobacco: Never Used   • Tobacco comment: daily caffiene   Substance Use Topics   • Alcohol use: No      Family History:  Family History   Problem Relation Age of Onset   • Diabetes Mother    • Hypertension Mother    • Macular degeneration Mother    • Alcohol abuse Father    • Cancer Father         lung   • Alcohol abuse Brother           Review of Systems  See history of present illness and past medical history.   Constitutional: Positive for unexpected weight change (10 pound weight gain). Negative for fever.   HENT: Negative for sore throat.    Respiratory: Positive for shortness of breath.    Cardiovascular: Positive for chest pain (midsternal; resolved) and leg swelling (worsening of chronic).   Gastrointestinal: Negative for abdominal pain.   Endocrine: Negative for polyuria.   Genitourinary: Negative for  "dysuria.   Musculoskeletal: Negative for neck pain.   Skin: Negative for rash.   Neurological: Negative for headaches.   All other systems reviewed and are negative.        Vitals:   /98 (BP Location: Left arm, Patient Position: Lying)   Pulse 88   Temp 97.8 °F (36.6 °C) (Oral)   Resp 20   Ht 182.9 cm (72\")   Wt 86.4 kg (190 lb 8 oz)   SpO2 90%   BMI 25.84 kg/m²    I/O:     Intake/Output Summary (Last 24 hours) at 7/2/2019 1000  Last data filed at 7/2/2019 2138  Gross per 24 hour   Intake --   Output 200 ml   Net -200 ml     Exam:  General Appearance:    Alert, cooperative, no distress, appears stated age and currently does not appear to be in any acute distress.   Head:    Normocephalic, without obvious abnormality, atraumatic   Eyes:    PERRL, conjunctiva/corneas clear, EOM's intact, both eyes   Ears:    Normal external ear canals, both ears   Nose:   Nares normal, septum midline, mucosa normal, no drainage    or sinus tenderness   Throat:   Lips, tongue, gums normal; oral mucosa pink and moist   Neck:   Supple, symmetrical, trachea midline, no adenopathy;     thyroid:  no enlargement/tenderness/nodules; no carotid    bruit or JVD   Back:     Symmetric, no curvature, ROM normal, no CVA tenderness   Lungs:    Bibasilar crackles no use of accessory muscles of breathing noted scattered rhonchi anteriorly   Chest Wall:    No tenderness or deformity    Heart:    Regular rate and rhythm, S1 and S2 normal, no murmur, rub   or gallop   Abdomen:     Soft, non-tender, bowel sounds active all four quadrants,     no masses, no hepatomegaly, no splenomegaly   Extremities:  Bilateral lower extremity edema   Pulses:   Pulses palpable in all extremities; symmetric all extremities   Skin:   Skin color normal, Skin is warm and dry,  no rashes or palpable lesions   Neurologic:   CNII-XII intact, motor strength grossly intact, sensation grossly intact to light touch, no focal deficits noted       Data Review:      I " reviewed the patient's new clinical results.  Results from last 7 days   Lab Units 07/02/19  1814   WBC 10*3/mm3 6.63   HEMOGLOBIN g/dL 10.1*   PLATELETS 10*3/mm3 278     Results from last 7 days   Lab Units 07/02/19  1814   SODIUM mmol/L 146*   POTASSIUM mmol/L 4.3   CHLORIDE mmol/L 111*   CO2 mmol/L 22.8   BUN mg/dL 31*   CREATININE mg/dL 1.59*   CALCIUM mg/dL 9.5   GLUCOSE mg/dL 119*     Xr Chest 2 View    Result Date: 7/2/2019  Interval worsening, with increased opacities at the mid to lower lungs, increased pleural effusions. Continued follow-up recommended.  This report was finalized on 7/2/2019 6:50 PM by Dr. Drake Dennis M.D.      ECG 12 Lead   Preliminary Result   HEART RATE= 66  bpm   RR Interval= 908  ms   IL Interval= 189  ms   P Horizontal Axis= -9  deg   P Front Axis= 2  deg   QRSD Interval= 140  ms   QT Interval= 444  ms   QRS Axis= 162  deg   T Wave Axis= 29  deg   - ABNORMAL ECG -   Sinus rhythm   Nonspecific intraventricular conduction delay   Inferior infarct, old   Anteroseptal infarct, age indeterminate   Electronically Signed By:    Date and Time of Study: 2019-07-02 18:17:53        Troponin 0 0.077  BNP 5677  Assessment:  Active Hospital Problems    Diagnosis POA   • **Acute on chronic congestive heart failure (CMS/HCC) [I50.9] Yes   • Elevated troponin [R74.8] Unknown   • YAW on CPAP [G47.33, Z99.89] Not Applicable   • Acute respiratory failure with hypoxia (CMS/[facility]) [J96.01] Yes   • Chronic combined systolic and diastolic congestive heart failure (CMS/HCC) [I50.42] Yes   • SYLVAIN (acute kidney injury) (CMS/[facility]) [N17.9] Yes   • Acquired hypothyroidism [E03.9] Yes   • Benign prostatic hyperplasia with nocturia [N40.1, R35.1] Yes     0-1x/night     • CKD (chronic kidney disease) stage 3, GFR 30-59 ml/min (CMS/[facility]) [N18.3] Yes   • Type 2 diabetes mellitus with ophthalmic complication (CMS/[facility]) [E11.39] Yes   • Chronic ischemic heart disease [I25.9] Yes       Medical decision making:  Acute  exacerbation of chronic combined systolic/diastolic congestive heart failure manifested his weight gain, lower extremity edema and increasing shortness of breath.  Plan is to admit the patient provide him with oxygen IV diuretics Daily weight and consider cardiology evaluation.  Systemic subjective chills with sore throat-rule out systemic respiratory viral illness contributing to this exacerbation check respiratory viral panel.  Mild chest pain with indeterminate elevation of troponin-plan is to check serial troponin and cardiology evaluation.  COPD with hypoxia and CPAP but patient appears and claims that he is not completely compliant with this recommendation and does not use oxygen as he is supposed to use his CPAP as he supposed to.  Family is concerned about his ongoing smoking though patient denies it.  Plan is to monitor his oxygen saturation provide him with as needed nebulizer treatment.  Diabetes mellitus-continue his home regimen provided with Accu-Cheks and sliding scale coverage.  Hypothyroidism-continues thyroid replacement therapy.  Chronic kidney disease-monitor his renal function avoid nephrotoxic agent and hypotensive episodes.      Tamara Awad MD   7/2/2019  11:24 PM  Much of this encounter note is an electronic transcription/translation of spoken language to printed text. The electronic translation of spoken language may permit erroneous, or at times, nonsensical words or phrases to be inadvertently transcribed; Although I have reviewed the note for such errors, some may still exist      Electronically signed by Tamara Awad MD at 7/3/2019  5:46 AM          Emergency Department Notes      Massiel Mccrary, RN at 7/2/2019  5:57 PM        Pt c/o SOA and wheezing x a few days, sent from urgent care.     Electronically signed by Massiel Mccrary RN at 7/2/2019  5:59 PM     Riki Ambrosio II, MD at 7/2/2019  6:15 PM           EMERGENCY DEPARTMENT ENCOUNTER    Room Number:  40/40  Date seen:   7/2/2019  Time seen: 6:15 PM  PCP: Jyoti Reynolds MD  Historian: patient      HPI:  Chief Complaint: shortness of breath  A complete HPI/ROS/PMH/PSH/SH/FH are unobtainable due to: nothing  Context: Carltio Mo is a 64 y.o. male who presents to the ED c/o shortness of breath which has been ongoing for the past two days. The patient denies orthopnea. The patient also complains of associated worsening of chronic bilateral leg swelling for the past day. The patient reports he was seen at [facility] at which time he was diagnosed with congestive heart failure and was advised to come to the ER for further evaluation. The patient reports he had midsternal chest pain while he was at  which has since resolved. The patient reports hx of CABG x 4 in 2001. The patient also reports a hx of COPD and combined systolic and diastolic congestive heart failure. The patient reports he is not compliant with his supplemental home oxygen that he was directed to wear 16 hours daily. The patient denies being a current or former smoker but states his son smokes. There are no other complaints at this time.    The patient reports his dry weight is 190 pounds. His current weight is 200 pounds.    Location: respiratory  Quality: shortness of breath  Intensity/Severity: moderate  Duration: two days  Onset quality: gradual  Timing: constant  Progression: not specified  Aggravating Factors: The patient denies orthopnea.   Alleviating Factors: none specified  Previous Episodes: The patient has a hx of COPD.  Treatment before arrival: The patient reports he was seen at [facility] at which time he was diagnosed with congestive heart failure and was advised to come to the ER for further evaluation.  Associated Symptoms: bilateral lower leg swelling, unexpected weight gain of 10 pounds, and midsternal chest pain (resolved)    PAST MEDICAL HISTORY  Active Ambulatory Problems     Diagnosis Date Noted   • Major depressive disorder with single  episode, in partial remission (CMS/HCC)    • Other hyperlipidemia    • Chronic ischemic heart disease    • Vitamin D deficiency 12/17/2015   • Erectile dysfunction 12/17/2015   • Chronic fatigue 04/25/2016   • Type 2 diabetes mellitus with ophthalmic complication (CMS/HCC) 04/25/2016   • Skin lesion of chest wall 06/20/2016   • Slow transit constipation 09/01/2016   • Benign prostatic hyperplasia with nocturia 12/20/2016   • CKD (chronic kidney disease) stage 3, GFR 30-59 ml/min (CMS/HCC) 12/20/2016   • History of basal cell carcinoma 12/20/2016   • Essential hypertension 12/20/2016   • Acquired hypothyroidism 12/20/2017   • Cerebrovascular accident (CVA) due to embolism of precerebral artery (CMS/HCC) 02/20/2018   • Urinary retention 09/20/2018   • SYLVAIN (acute kidney injury) (CMS/HCC) 09/20/2018   • Pneumonia 09/21/2018   • Chronic combined systolic and diastolic congestive heart failure (CMS/HCC) 09/21/2018   • Acute respiratory failure with hypoxia (CMS/HCC) 09/21/2018   • Suspected sleep apnea 10/29/2018   • Pleural effusion 12/01/2018   • Chronic CHF (congestive heart failure) (CMS/HCC) 12/12/2018   • Snoring 12/13/2018   • YAW on CPAP 12/13/2018   • Coronary artery disease involving native coronary artery of native heart without angina pectoris 04/18/2019   • Coronary artery disease 04/23/2019     Resolved Ambulatory Problems     Diagnosis Date Noted   • Anemia    • Arthritis    • Diabetes mellitus out of control (CMS/HCC)    • Type 2 diabetes mellitus (CMS/HCC)    • Acute sinusitis    • Accumulation of fluid in tissues 12/17/2015   • Fatigue 12/17/2015   • Cardiomyopathy, ischemic 12/17/2015   • Acute appendicitis 03/02/2016   • Atherosclerosis of coronary artery 10/16/2012   • Altered mental status 11/30/2017   • Hypertensive urgency 02/16/2018   • Hypertensive encephalopathy syndrome 04/30/2018   • History of medication noncompliance 05/04/2018   • Hyperglycemia 05/04/2018   • Screening for colorectal cancer  08/22/2018   • Constipation 08/22/2018     Past Medical History:   Diagnosis Date   • Acquired hypothyroidism    • Acute congestive heart failure (CMS/HCC)    • Acute respiratory failure with hypoxia (CMS/HCC)    • Acute sinusitis    • SYLVAIN (acute kidney injury) (CMS/HCC)    • Anemia    • Arthritis    • CAD (coronary artery disease)    • Cancer (CMS/HCC)    • Chronic combined systolic and diastolic congestive heart failure (CMS/HCC)    • Chronic ischemic heart disease    • CKD (chronic kidney disease)    • Depression    • Diabetes mellitus type 2, uncontrolled, without complications (CMS/HCC)    • Disease of thyroid gland    • Fatigue    • Frequent PVCs    • Heart attack (CMS/HCC)    • History of coronary artery disease    • History of transfusion    • Hyperglycemia    • Hyperlipidemia    • Hypertension    • Hypertensive encephalopathy    • Mental status change    • Pleural effusion    • Pneumonia    • RBBB (right bundle branch block)    • Screening for prostate cancer 2011   • Stroke (CMS/HCC)    • TIA (transient ischemic attack)    • Type 2 diabetes mellitus (CMS/HCC)    • Urinary retention    • Vitamin D deficiency          PAST SURGICAL HISTORY  Past Surgical History:   Procedure Laterality Date   • APPENDECTOMY N/A 02/14/2016    Dr. Alexey Dodson   • COLONOSCOPY      over 20 years ago.  no polyps    • COLONOSCOPY N/A 9/18/2018    Procedure: COLONOSCOPY;  Surgeon: Jose Enrique Louie MD;  Location: [facility] ENDOSCOPY;  Service: Gastroenterology   • COLONOSCOPY N/A 9/19/2018    IH, EH, polyps (TAs w/low grade dysplasia)   • CORONARY ARTERY BYPASS GRAFT  11/2001   • TONSILLECTOMY     • VASECTOMY           FAMILY HISTORY  Family History   Problem Relation Age of Onset   • Diabetes Mother    • Hypertension Mother    • Macular degeneration Mother    • Alcohol abuse Father    • Cancer Father         lung   • Alcohol abuse Brother          SOCIAL HISTORY  Social History     Socioeconomic History   • Marital status:       Spouse name: Not on file   • Number of children: Not on file   • Years of education: Not on file   • Highest education level: Not on file   Tobacco Use   • Smoking status: Never Smoker   • Smokeless tobacco: Never Used   • Tobacco comment: daily caffiene   Substance and Sexual Activity   • Alcohol use: No   • Drug use: No   • Sexual activity: Defer         ALLERGIES  Fluoxetine and Prozac [fluoxetine hcl]        REVIEW OF SYSTEMS  Review of Systems   Constitutional: Positive for unexpected weight change (10 pound weight gain). Negative for fever.   HENT: Negative for sore throat.    Respiratory: Positive for shortness of breath.    Cardiovascular: Positive for chest pain (midsternal; resolved) and leg swelling (worsening of chronic).   Gastrointestinal: Negative for abdominal pain.   Endocrine: Negative for polyuria.   Genitourinary: Negative for dysuria.   Musculoskeletal: Negative for neck pain.   Skin: Negative for rash.   Neurological: Negative for headaches.   All other systems reviewed and are negative.           PHYSICAL EXAM  ED Triage Vitals [07/02/19 1757]   Temp Heart Rate Resp BP SpO2   97 °F (36.1 °C) 69 15 -- 91 %      Temp src Heart Rate Source Patient Position BP Location FiO2 (%)   -- -- -- -- --         GENERAL: not distressed  HENT: nares patent, JVD to the lower mandible  EYES: no scleral icterus  CV: regular rhythm, regular rate, S3  RESPIRATORY: normal effort, bibasilar crackles, 2+ radial pulses bilaterally  ABDOMEN: soft, nontender  MUSCULOSKELETAL: no deformity, 3+ edema bilaterally with left worse than right  NEURO: alert, LIGHT, FC  SKIN: warm, dry    Vital signs and nursing notes reviewed.          LAB RESULTS  Recent Results (from the past 24 hour(s))   Basic Metabolic Panel    Collection Time: 07/02/19  6:14 PM   Result Value Ref Range    Glucose 119 (H) 65 - 99 mg/dL    BUN 31 (H) 8 - 23 mg/dL    Creatinine 1.59 (H) 0.76 - 1.27 mg/dL    Sodium 146 (H) 136 - 145 mmol/L     Potassium 4.3 3.5 - 5.2 mmol/L    Chloride 111 (H) 98 - 107 mmol/L    CO2 22.8 22.0 - 29.0 mmol/L    Calcium 9.5 8.6 - 10.5 mg/dL    eGFR Non African Amer 44 (L) >60 mL/min/1.73    BUN/Creatinine Ratio 19.5 7.0 - 25.0    Anion Gap 12.2 5.0 - 15.0 mmol/L   BNP    Collection Time: 07/02/19  6:14 PM   Result Value Ref Range    proBNP 5,677.0 (H) 5.0 - 900.0 pg/mL   Troponin    Collection Time: 07/02/19  6:14 PM   Result Value Ref Range    Troponin T 0.077 (C) 0.000 - 0.030 ng/mL   Protime-INR    Collection Time: 07/02/19  6:14 PM   Result Value Ref Range    Protime 18.9 (H) 11.7 - 14.2 Seconds    INR 1.62 (H) 0.90 - 1.10   CBC Auto Differential    Collection Time: 07/02/19  6:14 PM   Result Value Ref Range    WBC 6.63 3.40 - 10.80 10*3/mm3    RBC 4.13 (L) 4.14 - 5.80 10*6/mm3    Hemoglobin 10.1 (L) 13.0 - 17.7 g/dL    Hematocrit 33.4 (L) 37.5 - 51.0 %    MCV 80.9 79.0 - 97.0 fL    MCH 24.5 (L) 26.6 - 33.0 pg    MCHC 30.2 (L) 31.5 - 35.7 g/dL    RDW 14.9 12.3 - 15.4 %    RDW-SD 43.7 37.0 - 54.0 fl    MPV 10.3 6.0 - 12.0 fL    Platelets 278 140 - 450 10*3/mm3    Neutrophil % 66.8 42.7 - 76.0 %    Lymphocyte % 20.8 19.6 - 45.3 %    Monocyte % 9.0 5.0 - 12.0 %    Eosinophil % 3.0 0.3 - 6.2 %    Basophil % 0.2 0.0 - 1.5 %    Immature Grans % 0.2 0.0 - 0.5 %    Neutrophils, Absolute 4.43 1.70 - 7.00 10*3/mm3    Lymphocytes, Absolute 1.38 0.70 - 3.10 10*3/mm3    Monocytes, Absolute 0.60 0.10 - 0.90 10*3/mm3    Eosinophils, Absolute 0.20 0.00 - 0.40 10*3/mm3    Basophils, Absolute 0.01 0.00 - 0.20 10*3/mm3    Immature Grans, Absolute 0.01 0.00 - 0.05 10*3/mm3    nRBC 0.0 0.0 - 0.2 /100 WBC       Ordered the above labs and reviewed the results.        RADIOLOGY  XR Chest 2 View   Final Result   Interval worsening, with increased opacities at the mid to   lower lungs, increased pleural effusions. Continued follow-up   recommended.       This report was finalized on 7/2/2019 6:50 PM by Dr. Drake Dennis M.D.                    PROCEDURES  Procedures        EKG:           EKG time: 1817  Rhythm/Rate: SR, 66  P waves and [place]: nml; nml  QRS, axis: RBBB  ST and T waves: nml; nml     Interpreted Contemporaneously by me, independently viewed  Unchanged compared to prior from 05/25/2019.              MEDICATIONS GIVEN IN ER  Medications   sodium chloride 0.9 % flush 10 mL (not administered)   furosemide (LASIX) injection 60 mg (60 mg Intravenous Given 7/2/19 1936)   aspirin chewable tablet 324 mg (324 mg Oral Given 7/2/19 1939)                   PROGRESS AND CONSULTS  ED Course as of Jul 02 1959   Tue Jul 02, 2019   1822 Read outside records from [facility].  He was diagnosed with heart failure.  I agree with this conclusion as he has notable lower extremity edema with an S3 and bibasilar crackles.  [TD]      ED Course User Index  [TD] Riki Ambrosio II, MD     1806 Ordered CXR, EKG, and blood work for further evaluation.    1908 Ordered Lasix to treat fluid retention. Also ordered 324 mg ASA as an anticoagulant. Placed call to [facility] for admission.    1949 Received a call from Dr. Awad ([facility]) and discussed pt's case. Dr. Awad agreed with plan to admit the patient to [facility] for further evaluation and management.      MEDICAL DECISION MAKING      MDM  Number of Diagnoses or Management Options  Acute on chronic congestive heart failure, unspecified heart failure type (CMS/HCC):   Shortness of breath:   Diagnosis management comments: Patient presents with CHF exacerbation.  Admit for IV diuresis.  Have extremely low suspicion for pulmonary embolism, ACS (troponin is at his baseline), thoracic aortic dissection, pneumonia.       Amount and/or Complexity of Data Reviewed  Clinical lab tests: ordered and reviewed (BNP: 5677)  Tests in the radiology section of CPT®:  ordered and reviewed (Xr Chest 2 View:  Interval worsening, with increased opacities at the mid to lower lungs, increased pleural effusions. Continued follow-up  "recommended.)  Tests in the medicine section of CPT®:  reviewed and ordered (See procedure notes for EKG interpretation.)  Decide to obtain previous medical records or to obtain history from someone other than the patient: yes (Epic)  Review and summarize past medical records: yes (The patient was seen at [facility] at which time he was diagnosed with congestive heart failure and was advised to come to the ER for further evaluation.)  Discuss the patient with other providers: yes (Dr. Awad ([facility]))  Independent visualization of images, tracings, or specimens: yes    Patient Progress  Patient progress: stable             DIAGNOSIS  Final diagnoses:   Acute on chronic congestive heart failure, unspecified heart failure type (CMS/HCC)   Shortness of breath         DISPOSITION  ADMISSION TO [facility]    Discussed treatment plan and reason for admission with pt/family and admitting physician.  Pt/family voiced understanding of the plan for admission for further testing/treatment as needed.            Latest Documented Vital Signs:  As of 7:59 PM  BP- 171/98 HR- 64 Temp- 97 °F (36.1 °C) O2 sat- 93%        --  Documentation assistance provided by lida Apodaca for Dr. Hebert MD.  Information recorded by the scribe was done at my direction and has been verified and validated by me.       Nathaly Apodaca  07/02/19 1959       Riki Ambrosio II, MD  07/02/19 2228      Electronically signed by Riki Ambrosio II, MD at 7/2/2019 10:28 PM     Jojo Al RN at 7/2/2019  6:20 PM        Pt non compliant with home O2, states \"I am prescribed 16 hours a day to wear it, but I just cant\"       Jojo Al, DEMAR  07/02/19 1821      Electronically signed by Jojo Al RN at 7/2/2019  6:21 PM       ICU Vital Signs     Row Name 07/03/19 1225 07/03/19 1100 07/03/19 1015 07/03/19 0816 07/03/19 0805       Vitals    Pulse  65  --  67  --  64    Heart Rate Source  --  Monitor  --  Monitor  Monitor    Resp  " "--  16  --  18  18    Resp Rate Source  --  --  --  --  Visual       Oxygen Therapy    SpO2  95 %  --  93 %  --  92 %    Pulse Oximetry Type  Continuous  Continuous  Continuous  Continuous  Continuous    Device (Oxygen Therapy)  nasal cannula  nasal cannula  nasal cannula  nasal cannula  nasal cannula    Flow (L/min)  2  2  2  2  2    Row Name 07/03/19 0725 07/03/19 0500 07/03/19 0219 07/03/19 0146 07/03/19 0100       Height and Weight    Weight  --  85.5 kg (188 lb 9.6 oz)  --  --  --    Weight Method  --  Standing scale  --  --  --       Vitals    Temp  97.4 °F (36.3 °C)  --  --  97.6 °F (36.4 °C)  --    Temp src  Oral  --  --  Oral  --    Pulse  65  --  --  66  66    Heart Rate Source  Monitor  --  --  Monitor  --    Resp  19  --  --  16  --    Resp Rate Source  Visual  --  --  Visual  --    BP  134/75  --  --  143/92  --    BP Location  Left arm  --  --  Left arm  --    BP Method  Automatic  --  --  Automatic  --    Patient Position  Lying  --  --  Lying  --       Oxygen Therapy    SpO2  92 %  --  --  94 %  --    Pulse Oximetry Type  Continuous  --  --  Continuous  --    Device (Oxygen Therapy)  nasal cannula  --  nasal cannula  nasal cannula  --    Flow (L/min)  2  --  2  2  --    Row Name 07/02/19 2300 07/02/19 2220 07/02/19 2158 07/02/19 2035 07/02/19 1903       Height and Weight    Height  --  --  182.9 cm (72\")  --  --    Height Method  --  --  Stated  --  --    Weight  --  --  86.4 kg (190 lb 8 oz)  --  --    Weight Method  --  --  Bed scale  --  --    Ideal Body Weight (IBW) (kg)  --  --  82.07  --  --    BSA (Calculated - sq m)  --  --  2.09 sq meters  --  --    BMI (Calculated)  --  --  25.8  --  --    Weight in (lb) to have BMI = 25  --  --  183.9  --  --       Vitals    Temp  --  --  97.8 °F (36.6 °C)  --  --    Temp src  --  --  Oral  --  --    Pulse  --  --  88  77  64    Heart Rate Source  --  --  Monitor  --  --    Resp  --  --  20  --  --    Resp Rate Source  --  --  Visual  --  --    BP  --  --  " "175/98  176/99  171/98    Noninvasive MAP (mmHg)  --  --  --  113  144    BP Location  --  --  Left arm  --  --    BP Method  --  --  Automatic  --  --    Patient Position  --  --  Lying  --  --       Oxygen Therapy    SpO2  91 %  --  90 %  93 %  93 %    Pulse Oximetry Type  --  --  Continuous  --  --    Device (Oxygen Therapy)  --  nasal cannula  nasal cannula  --  --    Flow (L/min)  --  2  2  --  --    Row Name 07/02/19 18:15:04 07/02/19 1757                Height and Weight    Height  --  182.9 cm (72\")       Height Method  --  Stated       Weight  86.2 kg (190 lb)  --       Weight Method  Stated  --       Ideal Body Weight (IBW) (kg)  --  82.07       Weight in (lb) to have BMI = 25  --  183.9          Vitals    Temp  --  97 °F (36.1 °C)       Pulse  --  69       Resp  --  15       BP  152/97  --          Oxygen Therapy    SpO2  --  91 %       Device (Oxygen Therapy)  --  room air           Lines, Drains & Airways    Active LDAs     Name:   Placement date:   Placement time:   Site:   Days:    Peripheral IV 07/02/19 1822 Right Antecubital   07/02/19 1822    Antecubital   less than 1         Inactive LDAs     None                Hospital Medications (all)       Dose Frequency Start End    amLODIPine (NORVASC) tablet 5 mg 5 mg Daily 7/3/2019     Sig - Route: Take 1 tablet by mouth Daily. - Oral    aspirin chewable tablet 324 mg 324 mg Once 7/2/2019 7/2/2019    Sig - Route: Chew 4 tablets 1 (One) Time. - Oral    aspirin chewable tablet 324 mg 324 mg Once 7/3/2019 7/3/2019    Sig - Route: Chew 4 tablets 1 (One) Time. - Oral    aspirin EC tablet 81 mg 81 mg Every Morning 7/3/2019     Sig - Route: Take 1 tablet by mouth Every Morning. - Oral    atorvastatin (LIPITOR) tablet 40 mg 40 mg Nightly 7/3/2019     Sig - Route: Take 2 tablets by mouth Every Night. - Oral    buPROPion XL (WELLBUTRIN XL) 24 hr tablet 150 mg 150 mg Daily 7/3/2019     Sig - Route: Take 1 tablet by mouth Daily. - Oral    cholecalciferol (VITAMIN " D3) tablet 1,000 Units 1,000 Units Daily 7/3/2019     Sig - Route: Take 1 tablet by mouth Daily. - Oral    dextrose (D50W) 25 g/ 50mL Intravenous Solution 25 g 25 g Every 15 Minutes PRN 7/2/2019     Sig - Route: Infuse 50 mL into a venous catheter Every 15 (Fifteen) Minutes As Needed for Low Blood Sugar (Blood Sugar Less Than 70). - Intravenous    dextrose (GLUTOSE) oral gel 15 g 15 g Every 15 Minutes PRN 7/2/2019     Sig - Route: Take 15 application by mouth Every 15 (Fifteen) Minutes As Needed for Low Blood Sugar (Blood sugar less than 70). - Oral    ferrous sulfate tablet 325 mg 325 mg Daily With Breakfast 7/3/2019     Sig - Route: Take 1 tablet by mouth Daily With Breakfast. - Oral    furosemide (LASIX) injection 60 mg 60 mg Once 7/2/2019 7/2/2019    Sig - Route: Infuse 6 mL into a venous catheter 1 (One) Time. - Intravenous    furosemide (LASIX) injection 60 mg 60 mg 2 Times Daily (Diuretics) 7/3/2019     Sig - Route: Infuse 6 mL into a venous catheter 2 (Two) Times a Day. - Intravenous    glucagon (human recombinant) (GLUCAGEN DIAGNOSTIC) injection 1 mg 1 mg As Needed 7/2/2019     Sig - Route: Inject 1 mg under the skin into the appropriate area as directed As Needed for Low Blood Sugar (Blood Glucose Less Than 70). - Subcutaneous    insulin glargine (LANTUS) injection 14 Units 14 Units Nightly 7/3/2019     Sig - Route: Inject 14 Units under the skin into the appropriate area as directed Every Night. - Subcutaneous    insulin lispro (humaLOG) injection 0-9 Units 0-9 Units 4 Times Daily With Meals & Nightly 7/3/2019     Sig - Route: Inject 0-9 Units under the skin into the appropriate area as directed 4 (Four) Times a Day With Meals & at Bedtime. - Subcutaneous    insulin lispro (humaLOG) injection 2 Units 2 Units 3 Times Daily Before Meals 7/3/2019     Sig - Route: Inject 2 Units under the skin into the appropriate area as directed 3 (Three) Times a Day Before Meals. - Subcutaneous    ipratropium-albuterol  "(DUO-NEB) nebulizer solution 3 mL 3 mL 4 Times Daily - RT 7/3/2019     Sig - Route: Take 3 mL by nebulization 4 (Four) Times a Day. - Nebulization    levothyroxine (SYNTHROID, LEVOTHROID) tablet 75 mcg 75 mcg Every Early Morning 7/3/2019     Sig - Route: Take 1 tablet by mouth Every Morning. - Oral    losartan (COZAAR) tablet 50 mg 50 mg Daily 7/3/2019     Sig - Route: Take 1 tablet by mouth Daily. - Oral    metoprolol succinate XL (TOPROL-XL) 24 hr tablet 25 mg 25 mg Every 24 Hours Scheduled 7/3/2019     Sig - Route: Take 1 tablet by mouth Daily. - Oral    nitroglycerin (NITROSTAT) SL tablet 0.4 mg 0.4 mg Every 5 Minutes PRN 7/2/2019     Sig - Route: Place 1 tablet under the tongue Every 5 (Five) Minutes As Needed for Chest Pain (Chest Pain With Systolic Blood Pressure Greater Than 100). - Sublingual    ondansetron (ZOFRAN) injection 4 mg 4 mg Every 6 Hours PRN 7/2/2019     Sig - Route: Infuse 2 mL into a venous catheter Every 6 (Six) Hours As Needed for Nausea or Vomiting. - Intravenous    Pharmacy to dose warfarin  Continuous PRN 7/2/2019     Sig - Route: Continuous As Needed for Consult (to keep INR ). - Does not apply    sodium chloride 0.9 % flush 10 mL 10 mL As Needed 7/2/2019     Sig - Route: Infuse 10 mL into a venous catheter As Needed for Line Care. - Intravenous    Linked Group 1:  \"And\" Linked Group Details        sodium chloride 0.9 % flush 3 mL 3 mL Every 12 Hours Scheduled 7/3/2019     Sig - Route: Infuse 3 mL into a venous catheter Every 12 (Twelve) Hours. - Intravenous    sodium chloride 0.9 % flush 3-10 mL 3-10 mL As Needed 7/2/2019     Sig - Route: Infuse 3-10 mL into a venous catheter As Needed for Line Care. - Intravenous    tamsulosin (FLOMAX) 24 hr capsule 0.4 mg 0.4 mg Nightly 7/3/2019     Sig - Route: Take 1 capsule by mouth Every Night. - Oral    warfarin (COUMADIN) tablet 7.5 mg 7.5 mg Daily Warfarin 7/3/2019     Sig - Route: Take 1 tablet by mouth Daily. - Oral    warfarin (COUMADIN) " tablet 6 mg (Discontinued) 6 mg Daily 7/3/2019 7/2/2019    Sig - Route: Take 1 tablet by mouth Daily. - Oral    Reason for Discontinue: Dose adjustment    warfarin (COUMADIN) tablet 6.5 mg (Discontinued) 6.5 mg Daily Warfarin 7/3/2019 7/3/2019    Sig - Route: Take 6.5 mg by mouth Daily. - Oral    Reason for Discontinue: Dose adjustment          Orders (last 24 hrs)     Start     Ordered    07/03/19 1800  warfarin (COUMADIN) tablet 7.5 mg  Daily Warfarin      07/03/19 1235    07/03/19 1119  POC Glucose Once  Once      07/03/19 1109    07/03/19 0900  furosemide (LASIX) injection 60 mg  2 Times Daily (Diuretics)      07/02/19 2327 07/03/19 0900  buPROPion XL (WELLBUTRIN XL) 24 hr tablet 150 mg  Daily      07/02/19 2327 07/03/19 0900  cholecalciferol (VITAMIN D3) tablet 1,000 Units  Daily      07/02/19 2327 07/03/19 0900  warfarin (COUMADIN) tablet 6 mg  Daily,   Status:  Discontinued      07/02/19 2327 07/03/19 0900  metoprolol succinate XL (TOPROL-XL) 24 hr tablet 25 mg  Every 24 Hours Scheduled      07/02/19 2327 07/03/19 0900  losartan (COZAAR) tablet 50 mg  Daily      07/02/19 2327 07/03/19 0900  amLODIPine (NORVASC) tablet 5 mg  Daily      07/02/19 2327 07/03/19 0800  ferrous sulfate tablet 325 mg  Daily With Breakfast      07/02/19 2327 07/03/19 0800  insulin lispro (humaLOG) injection 0-9 Units  4 Times Daily With Meals & Nightly      07/02/19 2327 07/03/19 0730  insulin lispro (humaLOG) injection 2 Units  3 Times Daily Before Meals      07/02/19 2327 07/03/19 0700  aspirin EC tablet 81 mg  Every Morning      07/02/19 2327 07/03/19 0700  POC Glucose 4x Daily AC & at Bedtime  4 Times Daily Before Meals & at Bedtime      07/02/19 2327 07/03/19 0638  POC Glucose Once  Once      07/03/19 0635    07/03/19 0600  levothyroxine (SYNTHROID, LEVOTHROID) tablet 75 mcg  Every Early Morning      07/02/19 3199    07/03/19 0600  Comprehensive Metabolic Panel  Morning Draw      07/02/19 0826     07/03/19 0600  CBC Auto Differential  Morning Draw      07/02/19 2327 07/03/19 0600  Protime-INR  Morning Draw,   Status:  Canceled      07/02/19 2327 07/03/19 0600  Hemoglobin A1c  Morning Draw      07/02/19 2327 07/03/19 0030  aspirin chewable tablet 324 mg  Once      07/02/19 2327 07/03/19 0030  tamsulosin (FLOMAX) 24 hr capsule 0.4 mg  Nightly      07/02/19 2327 07/03/19 0030  atorvastatin (LIPITOR) tablet 40 mg  Nightly      07/02/19 2327 07/03/19 0015  insulin glargine (LANTUS) injection 14 Units  Nightly      07/02/19 2327 07/03/19 0015  ipratropium-albuterol (DUO-NEB) nebulizer solution 3 mL  4 Times Daily - RT      07/02/19 2327 07/03/19 0015  sodium chloride 0.9 % flush 3 mL  Every 12 Hours Scheduled      07/02/19 2327 07/03/19 0008  Respiratory Panel, PCR - Swab, Nasopharynx  Once      07/03/19 0007    07/03/19 0000  Vital Signs  Every 4 Hours      07/02/19 2327 07/03/19 0000  warfarin (COUMADIN) tablet 6.5 mg  Daily Warfarin,   Status:  Discontinued      07/02/19 2349 07/02/19 2328  Protime-INR  Daily      07/02/19 2327 07/02/19 2327  Diet Regular; Cardiac, Consistent Carbohydrate, Renal  Diet Effective Now      07/02/19 2327 07/02/19 2326  ondansetron (ZOFRAN) injection 4 mg  Every 6 Hours PRN      07/02/19 2327 07/02/19 2326  Code Status and Medical Interventions:  Continuous      07/02/19 2327 07/02/19 2326  Intake & Output  Every Shift      07/02/19 2327 07/02/19 2326  Weigh Patient  Once      07/02/19 2327 07/02/19 2326  Do NOT Hold Basal or Correction Scale Insulin When Patient is NPO, Hold Scheduled Mealtime (Bolus) Insulin if NPO  Continuous      07/02/19 2327 07/02/19 2326  Follow [facility] Hypoglycemia Standing Orders For Blood Glucose Less Than 70 mg/dL  Until Discontinued      07/02/19 2327 07/02/19 2326  Hypoglycemia Treatment - Alert Patient That is Not NPO and Can Safely Swallow  Until Discontinued     Comments:  Administer 4 oz Fruit  Juice OR 4 oz Regular Soda OR 8 oz Milk OR 15-30 grams (1 tube) of Glucose Gel.  Recheck Blood Glucose 15 Minutes After Ingestion, Repeat Treatment & Continue to Recheck Blood Sugar Every 15 Minutes Until Blood Glucose is 70 mg/dL or Higher.  Once Blood Glucose is 70 mg/dL or Higher and if It Will Be more than 60 Minutes Until the Next Meal, Provide Appropriate Snack (Including Carbohydrate Food) Based on Meal Plan Order. Give Meal Tray As Soon As Possible.    07/02/19 2327 07/02/19 2326  Hypoglycemia Treatment - Patient Has IV Access - Unresponsive, NPO or Unable To Safely Swallow  Until Discontinued     Comments:  Administer 25g (50ml) D50W IV Push.  Recheck Blood Glucose 15 Minutes After Administration, if Blood Glucose Remains Less Than 70 mg/dl, Repeat Treatment   Recheck Blood Glucose 15 Minutes After 2nd Administration, if Blood Glucose Remains Less Than 70 mg/dL After 2nd Dose of D50W, Contact Provider for Further Treatment Orders & Consider Adding IVF With D5W for Maintenance    07/02/19 2327 07/02/19 2326  Hypoglycemia Treatment - Patient Without IV Access - Unresponsive, NPO or Unable To Safely Swallow  Until Discontinued     Comments:  Administer 1mg Glucagon SQ & Establish IV Access.  Turn Patient on Side - Nausea / Vomiting May Occur.  Recheck Blood Glucose 15 Minutes After Administration.  If Blood Glucose Remains Less Than 70, Administer 25g D50W IV Push (50ml).  Recheck Blood Glucose 15 Minutes After Administration of D50W, if Blood Glucose Remains Less Than 70 mg/dl, Contact Provider for Further Treatment Orders & Consider Adding IVF With D5 for Maintenance    07/02/19 2327 07/02/19 2326  Hypoglycemia Treatment - Document Event & Patient Response to Interventions EMR, Document Medications on MAR  Continuous      07/02/19 2327 07/02/19 2326  Notify Provider - Hypoglycemia Treatment  Until Discontinued      07/02/19 2327 07/02/19 2326  Oxygen Therapy- Nasal Cannula; Titrate for SPO2:  90% - 95%  Continuous,   Status:  Canceled      07/02/19 2327    07/02/19 2326  Insert Peripheral IV  Once      07/02/19 2327 07/02/19 2326  Saline Lock & Maintain IV Access  Continuous,   Status:  Canceled      07/02/19 2327    07/02/19 2326  VTE Prophylaxis Not Indicated:  Once      07/02/19 2327    07/02/19 2326  Saline Lock  Continuous      07/02/19 2327 07/02/19 2326  Continuous Cardiac Monitoring  Continuous      07/02/19 2327 07/02/19 2326  Continuous Pulse Oximetry  Continuous     Comments:  For Unresponsiveness, Acute Dyspnea, Cyanosis or Suspected Hypoxemia.  Notify Physician    07/02/19 2327 07/02/19 2326  Oxygen Therapy- Nasal Cannula; Titrate for SPO2: 90% - 95%  Continuous     Comments:  For Unresponsiveness, Acute Dyspnea, Cyanosis or Suspected Hypoxemia.  Notify Physician    07/02/19 2327 07/02/19 2326  May Be Off Telemetry for Tests  Continuous      07/02/19 2327 07/02/19 2326  ACLS Protocol For Life Threatening Dysrhythmias (Unless Code Status Indicates Otherwise)  Continuous      07/02/19 2327 07/02/19 2326  Notify Provider if ACLS Protocol Activated  Until Discontinued      07/02/19 2327 07/02/19 2325  nitroglycerin (NITROSTAT) SL tablet 0.4 mg  Every 5 Minutes PRN      07/02/19 2327 07/02/19 2325  dextrose (GLUTOSE) oral gel 15 g  Every 15 Minutes PRN      07/02/19 2327 07/02/19 2325  dextrose (D50W) 25 g/ 50mL Intravenous Solution 25 g  Every 15 Minutes PRN      07/02/19 2327 07/02/19 2325  glucagon (human recombinant) (GLUCAGEN DIAGNOSTIC) injection 1 mg  As Needed      07/02/19 2327 07/02/19 2325  sodium chloride 0.9 % flush 3-10 mL  As Needed      07/02/19 2327 07/02/19 2324  Pharmacy to dose warfarin  Continuous PRN      07/02/19 2327 07/02/19 2243  POC Glucose Once  Once      07/02/19 2240    07/02/19 1950  Inpatient Admission  Once      07/02/19 1949    07/02/19 1910  aspirin chewable tablet 324 mg  Once      07/02/19 1908    07/02/19 1907   furosemide (LASIX) injection 60 mg  Once      07/02/19 1907    07/02/19 1909  [facility] (on-call MD unless specified) Details  Once     Specialty:  Hospitalist  Provider:  (Not yet assigned)    07/02/19 1908    07/02/19 1817  ECG 12 Lead  Once      07/03/19 1118    07/02/19 1807  CBC & Differential  Once      07/02/19 1806    07/02/19 1807  Basic Metabolic Panel  Once      07/02/19 1806    07/02/19 1807  BNP  Once      07/02/19 1806    07/02/19 1807  Troponin  Once      07/02/19 1806    07/02/19 1807  Protime-INR  Once      07/02/19 1806    07/02/19 1807  Insert peripheral IV  Once      07/02/19 1806    07/02/19 1807  Monitor Blood Pressure  Per Hospital Policy      07/02/19 1806    07/02/19 1807  Cardiac Monitoring  Once,   Status:  Canceled      07/02/19 1806    07/02/19 1807  Pulse Oximetry, Continuous  Continuous,   Status:  Canceled      07/02/19 1806    07/02/19 1807  XR Chest 2 View  1 Time Imaging      07/02/19 1806    07/02/19 1807  ECG 12 Lead  Once      07/02/19 1806    07/02/19 1807  CBC Auto Differential  PROCEDURE ONCE      07/02/19 1806    07/02/19 1806  sodium chloride 0.9 % flush 10 mL  As Needed      07/02/19 1806    Unscheduled  ECG 12 Lead  As Needed     Comments:  Nurse to Release if Patient Expericences Acute Chest Pain or Dysrhythmias    07/02/19 2327    Unscheduled  Potassium  As Needed     Comments:  For Ventricular Arrhythmias      07/02/19 2327    Unscheduled  Magnesium  As Needed     Comments:  For Ventricular Arrhythmias      07/02/19 2327    Unscheduled  Troponin  As Needed     Comments:  For Chest Pain      07/02/19 2327    Unscheduled  Digoxin Level  As Needed     Comments:  For Atrial Arrhythmias      07/02/19 2327    Unscheduled  Blood Gas, Arterial  As Needed     Comments:  Per O2 PolicyNotify Physician      07/02/19 2327    --  amLODIPine (NORVASC) 5 MG tablet  Daily      07/02/19 2131    --  ferrous sulfate 325 (65 FE) MG tablet  Daily With Breakfast      07/02/19 2131    --   ipratropium-albuterol (COMBIVENT RESPIMAT)  MCG/ACT inhaler  4 Times Daily PRN      07/02/19 0150

## 2019-07-03 NOTE — PLAN OF CARE
Problem: Patient Care Overview  Goal: Plan of Care Review  Outcome: Ongoing (interventions implemented as appropriate)   07/02/19 0041   Coping/Psychosocial   Plan of Care Reviewed With patient   Plan of Care Review   Progress no change   OTHER   Outcome Summary Pt admitted to the unit w/ SOA and increased BLE edema. A/C CHF. Given IV lasix in ED. Awaiting admission orders. VSS. BP elevated. NSR. 2LO2. Pt is unsure which medications he has had today.       Goal: Individualization and Mutuality  Outcome: Ongoing (interventions implemented as appropriate)    Goal: Discharge Needs Assessment  Outcome: Ongoing (interventions implemented as appropriate)    Goal: Interprofessional Rounds/Family Conf  Outcome: Ongoing (interventions implemented as appropriate)      Problem: Fall Risk (Adult)  Goal: Identify Related Risk Factors and Signs and Symptoms  Outcome: Ongoing (interventions implemented as appropriate)    Goal: Absence of Fall  Outcome: Ongoing (interventions implemented as appropriate)      Problem: Cardiac Output Decreased (Adult)  Goal: Identify Related Risk Factors and Signs and Symptoms  Outcome: Ongoing (interventions implemented as appropriate)    Goal: Effective Tissue Perfusion  Outcome: Ongoing (interventions implemented as appropriate)      Problem: Fluid Volume Excess (Adult)  Goal: Identify Related Risk Factors and Signs and Symptoms  Outcome: Ongoing (interventions implemented as appropriate)    Goal: Optimal Fluid Balance  Outcome: Ongoing (interventions implemented as appropriate)

## 2019-07-03 NOTE — PROGRESS NOTES
"DAILY PROGRESS NOTE  [facility]    Patient Identification:  Name: Carlito Mo  Age: 64 y.o.  Sex: male  :  1955  MRN: [id number]         Primary Care Physician: Jyoti Reynolds MD      Subjective  No new complaints.  Believes he is breathing somewhat better.    Objective:  General Appearance:  Comfortable, well-appearing, in no acute distress and not in pain.    Vital signs: (most recent): Blood pressure 138/79, pulse 61, temperature 97.3 °F (36.3 °C), temperature source Oral, resp. rate 16, height 182.9 cm (72\"), weight 85.5 kg (188 lb 9.6 oz), SpO2 92 %.    Lungs:  Normal effort and normal respiratory rate.  He is not in respiratory distress.  No stridor.  There are decreased breath sounds.  No rales, wheezes or rhonchi.  (Dull bases.)  Heart: Normal rate.  Regular rhythm.  Positive for murmur.    Extremities: There is dependent edema.  (Nearly 3+ pretibial edema bilaterally.)  Neurological: Patient is alert and oriented to person, place and time.    Skin:  Warm and dry.                Vital signs in last 24 hours:  Temp:  [97 °F (36.1 °C)-97.8 °F (36.6 °C)] 97.3 °F (36.3 °C)  Heart Rate:  [61-88] 61  Resp:  [15-20] 16  BP: (134-176)/(75-99) 138/79    Intake/Output:    Intake/Output Summary (Last 24 hours) at 7/3/2019 1556  Last data filed at 7/3/2019 1337  Gross per 24 hour   Intake 250 ml   Output 3895 ml   Net -3645 ml         Results from last 7 days   Lab Units 19  0320 19  1814   WBC 10*3/mm3 6.10 6.63   HEMOGLOBIN g/dL 9.2* 10.1*   PLATELETS 10*3/mm3 220 278     Results from last 7 days   Lab Units 19  0320 19  1814   SODIUM mmol/L 143 146*   POTASSIUM mmol/L 3.8 4.3   CHLORIDE mmol/L 108* 111*   CO2 mmol/L 23.9 22.8   BUN mg/dL 31* 31*   CREATININE mg/dL 1.64* 1.59*   GLUCOSE mg/dL 166* 119*   Estimated Creatinine Clearance: 55 mL/min (A) (by C-G formula based on SCr of 1.64 mg/dL (H)).  Results from last 7 days   Lab Units 19  0320 19  1814 "   CALCIUM mg/dL 9.0 9.5   ALBUMIN g/dL 3.40*  --      Results from last 7 days   Lab Units 07/03/19  0320   ALBUMIN g/dL 3.40*   BILIRUBIN mg/dL 0.3   ALK PHOS U/L 72   AST (SGOT) U/L 15   ALT (SGPT) U/L 17       Assessment:    Acute on chronic congestive heart failure: Continue diuresis.  Bring his cardiologist in on the case.    Chronic ischemic heart disease    Type 2 diabetes mellitus with ophthalmic complication: Continue present regime.    CKD (chronic kidney disease) stage 3, GFR 30-59 ml/min (CMS/HCC)    Acquired hypothyroidism: Check A1c.    Acute respiratory failure with hypoxia (CMS/HCC)    YAW on CPAP    Elevated troponin    Anemia: Check iron, folate etc.  Monitor.        Plan:  Please see above.    Flo Sherman MD  7/3/2019  3:56 PM

## 2019-07-03 NOTE — PROGRESS NOTES
Pharmacy Consult: Warfarin Dosing/ Monitoring    Carlito Mo is a 64 y.o. male, estimated creatinine clearance is 55 mL/min (A) (by C-G formula based on SCr of 1.64 mg/dL (H)). weighing 85.5 kg (188 lb 9.6 oz).     has a past medical history of Acquired hypothyroidism, Acute congestive heart failure (CMS/[facility]), Acute respiratory failure with hypoxia (CMS/[facility]), Acute sinusitis, SYLVAIN (acute kidney injury) (CMS/[facility]), Anemia, Arthritis, CAD (coronary artery disease), Cancer (CMS/[facility]), Chronic combined systolic and diastolic congestive heart failure (CMS/[facility]), Chronic ischemic heart disease, CKD (chronic kidney disease), COPD (chronic obstructive pulmonary disease) (CMS/[facility]), Depression, Diabetes mellitus type 2, uncontrolled, without complications (CMS/[facility]), Disease of thyroid gland, Fatigue, Frequent PVCs, Heart attack (CMS/[facility]), History of coronary artery disease, History of transfusion, Hyperglycemia, Hyperlipidemia, Hypertension, Hypertensive encephalopathy, Mental status change, Pleural effusion, Pneumonia, RBBB (right bundle branch block), Screening for prostate cancer (2011), Stroke (CMS/[facility]), TIA (transient ischemic attack), Type 2 diabetes mellitus (CMS/[facility]), Urinary retention, and Vitamin D deficiency.    Social History     Tobacco Use   • Smoking status: Never Smoker   • Smokeless tobacco: Never Used   • Tobacco comment: daily caffiene   Substance Use Topics   • Alcohol use: No   • Drug use: No       Results from last 7 days   Lab Units 07/03/19  0320 07/02/19  2349 07/02/19  1814   INR  1.72* 1.63* 1.62*   HEMOGLOBIN g/dL 9.2*  --  10.1*   HEMATOCRIT % 30.2*  --  33.4*   PLATELETS 10*3/mm3 220  --  278     Results from last 7 days   Lab Units 07/03/19  0320 07/02/19  1814   SODIUM mmol/L 143 146*   POTASSIUM mmol/L 3.8 4.3   CHLORIDE mmol/L 108* 111*   CO2 mmol/L 23.9 22.8   BUN mg/dL 31* 31*   CREATININE mg/dL 1.64* 1.59*   CALCIUM mg/dL 9.0 9.5   BILIRUBIN mg/dL 0.3  --    ALK PHOS U/L 72  --    ALT (SGPT)  U/L 17  --    AST (SGOT) U/L 15  --    GLUCOSE mg/dL 166* 119*     Anticoagulation history: 6mg daily (took 2mg tablets x3)    Hospital Anticoagulation:  Consulting provider: Dr. Awad  Start date: 7/2/19  Indication: AFIB  Target INR: 2-3  Expected duration: indefinite  Bridge Therapy: none    Date 7/2 7/3           INR 1.63 1.72           Warfarin dose 6.5mg 7.5mg             Potential drug interactions:     Relevant nutrition status: Diet Regular; Cardiac, Consistent Carbohydrate, Renal     Education complete?/ Date: not yet    Assessment/Plan:  Dose: increase daily dose to 7.5mg (INR is under target of 2-3)  Monitor INR daily.  Follow up tomorrow am.    Pharmacy will continue to follow until discharge or discontinuation of warfarin.   García Gerber BRYAN  7/3/2019

## 2019-07-04 LAB
ANION GAP SERPL CALCULATED.3IONS-SCNC: 12.6 MMOL/L (ref 5–15)
BASOPHILS # BLD AUTO: 0.01 10*3/MM3 (ref 0–0.2)
BASOPHILS NFR BLD AUTO: 0.1 % (ref 0–1.5)
BUN BLD-MCNC: 33 MG/DL (ref 8–23)
BUN/CREAT SERPL: 18.6 (ref 7–25)
CALCIUM SPEC-SCNC: 9.3 MG/DL (ref 8.6–10.5)
CHLORIDE SERPL-SCNC: 103 MMOL/L (ref 98–107)
CO2 SERPL-SCNC: 25.4 MMOL/L (ref 22–29)
CREAT BLD-MCNC: 1.77 MG/DL (ref 0.76–1.27)
DEPRECATED RDW RBC AUTO: 41.9 FL (ref 37–54)
EOSINOPHIL # BLD AUTO: 0.08 10*3/MM3 (ref 0–0.4)
EOSINOPHIL NFR BLD AUTO: 1 % (ref 0.3–6.2)
ERYTHROCYTE [DISTWIDTH] IN BLOOD BY AUTOMATED COUNT: 14.5 % (ref 12.3–15.4)
FOLATE SERPL-MCNC: 9.24 NG/ML (ref 4.78–24.2)
GFR SERPL CREATININE-BSD FRML MDRD: 39 ML/MIN/1.73
GLUCOSE BLD-MCNC: 44 MG/DL (ref 65–99)
GLUCOSE BLDC GLUCOMTR-MCNC: 195 MG/DL (ref 70–130)
GLUCOSE BLDC GLUCOMTR-MCNC: 260 MG/DL (ref 70–130)
GLUCOSE BLDC GLUCOMTR-MCNC: 45 MG/DL (ref 70–130)
GLUCOSE BLDC GLUCOMTR-MCNC: 47 MG/DL (ref 70–130)
GLUCOSE BLDC GLUCOMTR-MCNC: 56 MG/DL (ref 70–130)
GLUCOSE BLDC GLUCOMTR-MCNC: 71 MG/DL (ref 70–130)
HCT VFR BLD AUTO: 34 % (ref 37.5–51)
HGB BLD-MCNC: 10.5 G/DL (ref 13–17.7)
IMM GRANULOCYTES # BLD AUTO: 0.03 10*3/MM3 (ref 0–0.05)
IMM GRANULOCYTES NFR BLD AUTO: 0.4 % (ref 0–0.5)
INR PPP: 1.99 (ref 0.9–1.1)
IRON 24H UR-MRATE: 23 MCG/DL (ref 59–158)
IRON SATN MFR SERPL: 7 % (ref 20–50)
LYMPHOCYTES # BLD AUTO: 0.97 10*3/MM3 (ref 0.7–3.1)
LYMPHOCYTES NFR BLD AUTO: 12.3 % (ref 19.6–45.3)
MCH RBC QN AUTO: 24.4 PG (ref 26.6–33)
MCHC RBC AUTO-ENTMCNC: 30.9 G/DL (ref 31.5–35.7)
MCV RBC AUTO: 78.9 FL (ref 79–97)
MONOCYTES # BLD AUTO: 0.54 10*3/MM3 (ref 0.1–0.9)
MONOCYTES NFR BLD AUTO: 6.9 % (ref 5–12)
NEUTROPHILS # BLD AUTO: 6.25 10*3/MM3 (ref 1.7–7)
NEUTROPHILS NFR BLD AUTO: 79.3 % (ref 42.7–76)
NRBC BLD AUTO-RTO: 0 /100 WBC (ref 0–0.2)
PLATELET # BLD AUTO: 293 10*3/MM3 (ref 140–450)
PMV BLD AUTO: 11 FL (ref 6–12)
POTASSIUM BLD-SCNC: 3.6 MMOL/L (ref 3.5–5.2)
PROTHROMBIN TIME: 22.3 SECONDS (ref 11.7–14.2)
RBC # BLD AUTO: 4.31 10*6/MM3 (ref 4.14–5.8)
RETICS # AUTO: 0.04 10*6/MM3 (ref 0.02–0.13)
RETICS/RBC NFR AUTO: 0.93 % (ref 0.7–1.9)
SODIUM BLD-SCNC: 141 MMOL/L (ref 136–145)
TIBC SERPL-MCNC: 349 MCG/DL (ref 298–536)
TRANSFERRIN SERPL-MCNC: 234 MG/DL (ref 200–360)
TSH SERPL DL<=0.05 MIU/L-ACNC: 2.61 MIU/ML (ref 0.27–4.2)
VIT B12 BLD-MCNC: 416 PG/ML (ref 211–946)
WBC NRBC COR # BLD: 7.88 10*3/MM3 (ref 3.4–10.8)

## 2019-07-04 PROCEDURE — 85610 PROTHROMBIN TIME: CPT | Performed by: INTERNAL MEDICINE

## 2019-07-04 PROCEDURE — 63710000001 INSULIN LISPRO (HUMAN) PER 5 UNITS: Performed by: INTERNAL MEDICINE

## 2019-07-04 PROCEDURE — 25010000002 FUROSEMIDE PER 20 MG: Performed by: INTERNAL MEDICINE

## 2019-07-04 PROCEDURE — 99253 IP/OBS CNSLTJ NEW/EST LOW 45: CPT | Performed by: INTERNAL MEDICINE

## 2019-07-04 PROCEDURE — 80048 BASIC METABOLIC PNL TOTAL CA: CPT | Performed by: HOSPITALIST

## 2019-07-04 PROCEDURE — 83540 ASSAY OF IRON: CPT | Performed by: HOSPITALIST

## 2019-07-04 PROCEDURE — 82962 GLUCOSE BLOOD TEST: CPT

## 2019-07-04 PROCEDURE — 85025 COMPLETE CBC W/AUTO DIFF WBC: CPT | Performed by: HOSPITALIST

## 2019-07-04 PROCEDURE — 82607 VITAMIN B-12: CPT | Performed by: HOSPITALIST

## 2019-07-04 PROCEDURE — 82746 ASSAY OF FOLIC ACID SERUM: CPT | Performed by: HOSPITALIST

## 2019-07-04 PROCEDURE — 63710000001 INSULIN LISPRO (HUMAN) PER 5 UNITS: Performed by: HOSPITALIST

## 2019-07-04 PROCEDURE — 63710000001 INSULIN GLARGINE PER 5 UNITS: Performed by: HOSPITALIST

## 2019-07-04 PROCEDURE — 94799 UNLISTED PULMONARY SVC/PX: CPT

## 2019-07-04 PROCEDURE — 85045 AUTOMATED RETICULOCYTE COUNT: CPT | Performed by: HOSPITALIST

## 2019-07-04 PROCEDURE — 84443 ASSAY THYROID STIM HORMONE: CPT | Performed by: HOSPITALIST

## 2019-07-04 PROCEDURE — 84466 ASSAY OF TRANSFERRIN: CPT | Performed by: HOSPITALIST

## 2019-07-04 RX ORDER — INSULIN GLARGINE 100 [IU]/ML
8 INJECTION, SOLUTION SUBCUTANEOUS NIGHTLY
Status: DISCONTINUED | OUTPATIENT
Start: 2019-07-04 | End: 2019-07-06 | Stop reason: HOSPADM

## 2019-07-04 RX ORDER — DEXTROSE MONOHYDRATE 25 G/50ML
25 INJECTION, SOLUTION INTRAVENOUS
Status: DISCONTINUED | OUTPATIENT
Start: 2019-07-04 | End: 2019-07-06 | Stop reason: HOSPADM

## 2019-07-04 RX ORDER — NICOTINE POLACRILEX 4 MG
15 LOZENGE BUCCAL
Status: DISCONTINUED | OUTPATIENT
Start: 2019-07-04 | End: 2019-07-06 | Stop reason: HOSPADM

## 2019-07-04 RX ADMIN — SODIUM CHLORIDE, PRESERVATIVE FREE 3 ML: 5 INJECTION INTRAVENOUS at 20:00

## 2019-07-04 RX ADMIN — LEVOTHYROXINE SODIUM 75 MCG: 75 TABLET ORAL at 06:44

## 2019-07-04 RX ADMIN — LOSARTAN POTASSIUM 50 MG: 50 TABLET, FILM COATED ORAL at 09:10

## 2019-07-04 RX ADMIN — WARFARIN SODIUM 7.5 MG: 7.5 TABLET ORAL at 20:00

## 2019-07-04 RX ADMIN — SODIUM CHLORIDE, PRESERVATIVE FREE 3 ML: 5 INJECTION INTRAVENOUS at 09:11

## 2019-07-04 RX ADMIN — ATORVASTATIN CALCIUM 40 MG: 20 TABLET, FILM COATED ORAL at 20:00

## 2019-07-04 RX ADMIN — VITAMIN D, TAB 1000IU (100/BT) 1000 UNITS: 25 TAB at 09:10

## 2019-07-04 RX ADMIN — INSULIN LISPRO 2 UNITS: 100 INJECTION, SOLUTION INTRAVENOUS; SUBCUTANEOUS at 21:29

## 2019-07-04 RX ADMIN — IPRATROPIUM BROMIDE AND ALBUTEROL SULFATE 3 ML: 2.5; .5 SOLUTION RESPIRATORY (INHALATION) at 07:53

## 2019-07-04 RX ADMIN — ASPIRIN 81 MG: 81 TABLET, COATED ORAL at 06:44

## 2019-07-04 RX ADMIN — IPRATROPIUM BROMIDE AND ALBUTEROL SULFATE 3 ML: 2.5; .5 SOLUTION RESPIRATORY (INHALATION) at 15:30

## 2019-07-04 RX ADMIN — FERROUS SULFATE TAB 325 MG (65 MG ELEMENTAL FE) 325 MG: 325 (65 FE) TAB at 09:10

## 2019-07-04 RX ADMIN — INSULIN LISPRO 6 UNITS: 100 INJECTION, SOLUTION INTRAVENOUS; SUBCUTANEOUS at 11:46

## 2019-07-04 RX ADMIN — IPRATROPIUM BROMIDE AND ALBUTEROL SULFATE 3 ML: 2.5; .5 SOLUTION RESPIRATORY (INHALATION) at 19:55

## 2019-07-04 RX ADMIN — METOPROLOL SUCCINATE 25 MG: 25 TABLET, FILM COATED, EXTENDED RELEASE ORAL at 09:10

## 2019-07-04 RX ADMIN — FUROSEMIDE 60 MG: 10 INJECTION, SOLUTION INTRAMUSCULAR; INTRAVENOUS at 20:01

## 2019-07-04 RX ADMIN — BUPROPION HYDROCHLORIDE 150 MG: 150 TABLET, FILM COATED, EXTENDED RELEASE ORAL at 09:10

## 2019-07-04 RX ADMIN — FUROSEMIDE 60 MG: 10 INJECTION, SOLUTION INTRAMUSCULAR; INTRAVENOUS at 09:10

## 2019-07-04 RX ADMIN — AMLODIPINE BESYLATE 5 MG: 5 TABLET ORAL at 09:10

## 2019-07-04 RX ADMIN — INSULIN GLARGINE 8 UNITS: 100 INJECTION, SOLUTION SUBCUTANEOUS at 21:28

## 2019-07-04 RX ADMIN — TAMSULOSIN HYDROCHLORIDE 0.4 MG: 0.4 CAPSULE ORAL at 20:00

## 2019-07-04 RX ADMIN — INSULIN LISPRO 2 UNITS: 100 INJECTION, SOLUTION INTRAVENOUS; SUBCUTANEOUS at 11:46

## 2019-07-04 NOTE — PROGRESS NOTES
"DAILY PROGRESS NOTE  [facility]    Patient Identification:  Name: Carlito Mo  Age: 64 y.o.  Sex: male  :  1955  MRN: [id number]         Primary Care Physician: Jyoti Reynolds MD      Subjective  No new complaints.  States he is breathing easier.    Objective:  General Appearance:  Comfortable, well-appearing, in no acute distress and not in pain.    Vital signs: (most recent): Blood pressure 131/67, pulse 69, temperature 97.3 °F (36.3 °C), temperature source Oral, resp. rate 20, height 182.9 cm (72\"), weight 82.9 kg (182 lb 12.8 oz), SpO2 98 %.    Lungs:  Normal effort and normal respiratory rate.  He is not in respiratory distress.  No stridor.  There are decreased breath sounds.  No rales, wheezes or rhonchi.  (Dull bases.)  Heart: Normal rate.  Regular rhythm.    Extremities: There is dependent edema.  (Partially pitting pretibial edema.)  Neurological: Patient is alert and oriented to person, place and time.    Skin:  Warm and dry.                Vital signs in last 24 hours:  Temp:  [97.3 °F (36.3 °C)-98 °F (36.7 °C)] 97.3 °F (36.3 °C)  Heart Rate:  [62-69] 69  Resp:  [16-20] 20  BP: (127-166)/(66-87) 131/67    Intake/Output:    Intake/Output Summary (Last 24 hours) at 2019 1817  Last data filed at 2019 1725  Gross per 24 hour   Intake 240 ml   Output 3255 ml   Net -3015 ml         Results from last 7 days   Lab Units 19  0631 19  0320 19  1814   WBC 10*3/mm3 7.88 6.10 6.63   HEMOGLOBIN g/dL 10.5* 9.2* 10.1*   PLATELETS 10*3/mm3 293 220 278     Results from last 7 days   Lab Units 19  0631 19  0320 19  1814   SODIUM mmol/L 141 143 146*   POTASSIUM mmol/L 3.6 3.8 4.3   CHLORIDE mmol/L 103 108* 111*   CO2 mmol/L 25.4 23.9 22.8   BUN mg/dL 33* 31* 31*   CREATININE mg/dL 1.77* 1.64* 1.59*   GLUCOSE mg/dL 44* 166* 119*   Estimated Creatinine Clearance: 49.4 mL/min (A) (by C-G formula based on SCr of 1.77 mg/dL (H)).  Results from last 7 days "   Lab Units 07/04/19  0631 07/03/19  0320 07/02/19  1814   CALCIUM mg/dL 9.3 9.0 9.5   ALBUMIN g/dL  --  3.40*  --      Results from last 7 days   Lab Units 07/03/19  0320   ALBUMIN g/dL 3.40*   BILIRUBIN mg/dL 0.3   ALK PHOS U/L 72   AST (SGOT) U/L 15   ALT (SGPT) U/L 17       Assessment:     Acute on chronic congestive heart failure: Continue diuresis. Cardiology input appreciated.     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     CKD (chronic kidney disease) stage 3, GFR 30-59 ml/min:  Creat about his baseline.     Acquired hypothyroidism:     Acute respiratory failure with hypoxia:  Wean O2.     YAW on CPAP    Elevated troponin    Anemia - VIJAYA/ACD:       Plan:  Please see above.    Flo Sherman MD  7/4/2019  6:17 PM

## 2019-07-04 NOTE — CONSULTS
Patient Name: Carlito Mo  :1955  64 y.o.    Date of Admission: 2019  Date of Consultation:  19  Encounter Provider: Maude Cervantes MD  Place of Service: [facility] CARDIOLOGY  Referring Provider: Tamara Awad MD  Patient Care Team:  Jyoti Reynolds MD as PCP - General (Internal Medicine)  Amber Murguia MD as Consulting Physician (Cardiology)  Sera La RP as Pharmacist      Chief complaint:  CHF- new to me  History of Present Illness:    Carlito Mo is a 63 y.o. male patient of Dr. Murguia. Patient has a history of coronary artery disease, cardiomyopathy ischemic, EF 40-45%, respiratory failure, chronic kidney disease, hypothyroidism, type 2 diabetes, history of stroke while on dual antiplatelet therapy now on warfarin.     Patient was first seen by Dr. Murguia 2017 when he was admitted with altered mental status.  He was diagnosed with multiple small acute infarcts.  Transesophageal echo 2017 showed that his LV function was mildly low with an ejection fraction of 45% with focal wall motion abnormalities primarily in the septum and anterior wall and apex.  Small patent foramen ovale was noted and no significant valvular heart disease or cardiac source of emboli minus a small PFO were seen.  Holter monitor showed a couple of short bursts of SVT but no sustained atrial fib.      Patient presented to the ER 2019 complaining of intermittent chest discomfort overnight.  Troponins were mildly elevated in the setting of kidney injury and plateaued aroun 0.04.  Nuclear stress test was ordered which showed medium sized infarct in the apex without significant ischemia.  Beta-blockade was restarted.       He was seen in clinic for follow-up in May 2019 by our nurse practitioner Massiel Blake.  At that time he complained of shortness of breath and dizziness.  He was noncompliant with sodium restricted diet.  His diuretics were not changed.      He  returned to the hospital on July 2, 2019.  He complained of a progressive weight gain of 10 pounds, lower extremity edema, chest discomfort/shortness of breath.  He has been diuresed by the hospitalist service to good effect with IV Lasix.  This morning he complains of ongoing dyspnea and lower extremity edema, though he states is improved.  He states that while he was at home he had minimal urine output with his oral Lasix regimen.  He also admits to being noncompliant with sodium restricted diet.  He does say that he has been taking his medications regularly.  Chest pain has resolved.  He complains of mild orthopnea without PND.        Past Medical History:   Diagnosis Date   • Acquired hypothyroidism    • Acute congestive heart failure (CMS/HCC)    • Acute respiratory failure with hypoxia (CMS/[facility])    • Acute sinusitis    • SYLVAIN (acute kidney injury) (CMS/[facility])     on CKD   • Anemia    • Arthritis    • CAD (coronary artery disease)    • Cancer (CMS/[facility])     skin   • Chronic combined systolic and diastolic congestive heart failure (CMS/[facility])    • Chronic ischemic heart disease    • CKD (chronic kidney disease)    • COPD (chronic obstructive pulmonary disease) (CMS/[facility])    • Depression    • Diabetes mellitus type 2, uncontrolled, without complications (CMS/[facility])    • Disease of thyroid gland    • Fatigue    • Frequent PVCs    • Heart attack (CMS/[facility])    • History of coronary artery disease     with remote history of bypass surgery in 2001   • History of transfusion    • Hyperglycemia    • Hyperlipidemia    • Hypertension    • Hypertensive encephalopathy    • Mental status change     Acute   • Pleural effusion    • Pneumonia    • RBBB (right bundle branch block)    • Screening for prostate cancer 2011   • Stroke (CMS/[facility])    • TIA (transient ischemic attack)    • Type 2 diabetes mellitus (CMS/[facility])    • Urinary retention    • Vitamin D deficiency        Past Surgical History:   Procedure Laterality Date   • APPENDECTOMY  N/A 02/14/2016    Dr. Alexey Dodson   • COLONOSCOPY      over 20 years ago.  no polyps    • COLONOSCOPY N/A 9/18/2018    Procedure: COLONOSCOPY;  Surgeon: Jose Enrique Louie MD;  Location: [facility] ENDOSCOPY;  Service: Gastroenterology   • COLONOSCOPY N/A 9/19/2018    IH, EH, polyps (TAs w/low grade dysplasia)   • CORONARY ARTERY BYPASS GRAFT  11/2001   • TONSILLECTOMY     • VASECTOMY           Prior to Admission medications    Medication Sig Start Date End Date Taking? Authorizing Provider   amLODIPine (NORVASC) 5 MG tablet Take 5 mg by mouth Daily.   Yes Alonzo Dean MD   aspirin 81 MG EC tablet Take 81 mg by mouth Every Morning.   Yes Alonzo Dean MD   atorvastatin (LIPITOR) 40 MG tablet Take 40 mg by mouth Every Night.   Yes Alonzo Dean MD   buPROPion XL (WELLBUTRIN XL) 150 MG 24 hr tablet Take 150 mg by mouth Daily. 2/7/19  Yes Alonzo Dean MD   Cholecalciferol (VITAMIN D3) 5000 units capsule capsule Take 5,000 Units by mouth Daily.   Yes Alonzo Dean MD   ferrous sulfate 325 (65 FE) MG tablet Take 325 mg by mouth Daily With Breakfast.   Yes Alonzo Dean MD   furosemide (LASIX) 40 MG tablet Take 0.5 tablets by mouth Daily. 4/25/19  Yes Noemy Rea APRN   Insulin Glargine (BASAGLAR KWIKPEN) 100 UNIT/ML injection pen Inject 14 Units under the skin into the appropriate area as directed Every Night. Patient says he does not take consistently   Yes Alonzo Dean MD   insulin glulisine (APIDRA) 100 UNIT/ML injection Inject 2 Units under the skin into the appropriate area as directed 3 (Three) Times a Day Before Meals. Patient says he does not take consistently   Yes Alonzo Dean MD   ipratropium-albuterol (COMBIVENT RESPIMAT)  MCG/ACT inhaler Inhale 1 puff 4 (Four) Times a Day As Needed for Wheezing.   Yes Alonzo Dean MD   levothyroxine (SYNTHROID, LEVOTHROID) 75 MCG tablet Take 75 mcg by mouth Daily.   Yes Jermaine  MD Alonzo   losartan (COZAAR) 50 MG tablet Take 1 tablet by mouth Daily. 4/26/19  Yes Noemy Rea APRN   metoprolol succinate XL (TOPROL-XL) 25 MG 24 hr tablet Take 1 tablet by mouth Daily. 4/26/19  Yes Noemy Rea APRN   tamsulosin (FLOMAX) 0.4 MG capsule 24 hr capsule Take 1 capsule by mouth Every Night.   Yes Provider, MD Alonzo   warfarin (COUMADIN) 2 MG tablet Take 6 mg by mouth Daily.   Yes Provider, MD Alonzo       Allergies   Allergen Reactions   • Fluoxetine Other (See Comments)   • Prozac [Fluoxetine Hcl] Other (See Comments)     shaking       Social History     Socioeconomic History   • Marital status:      Spouse name: Not on file   • Number of children: Not on file   • Years of education: Not on file   • Highest education level: Not on file   Tobacco Use   • Smoking status: Never Smoker   • Smokeless tobacco: Never Used   • Tobacco comment: daily caffiene   Substance and Sexual Activity   • Alcohol use: No   • Drug use: No   • Sexual activity: Defer       Family History   Problem Relation Age of Onset   • Diabetes Mother    • Hypertension Mother    • Macular degeneration Mother    • Alcohol abuse Father    • Cancer Father         lung   • Alcohol abuse Brother        REVIEW OF SYSTEMS:   All systems reviewed.  Pertinent positives identified in HPI.  All other systems are negative.      Objective:     Vitals:    07/04/19 0018 07/04/19 0649 07/04/19 0746 07/04/19 0753   BP: 142/78  127/66    BP Location: Left arm  Left arm    Patient Position: Lying  Sitting    Pulse: 68  64 65   Resp: 16  16 16   Temp: 97.7 °F (36.5 °C)  98 °F (36.7 °C)    TempSrc: Oral  Oral    SpO2: 94%   96%   Weight:  82.9 kg (182 lb 12.8 oz)     Height:         Body mass index is 24.79 kg/m².    General Appearance:    Alert, cooperative, in no acute distress   Head:    Normocephalic, without obvious abnormality, atraumatic   Eyes:            Lids and lashes normal, conjunctivae and sclerae normal,  no   icterus, no pallor, corneas clear, PERRLA   Ears:    Ears appear intact with no abnormalities noted   Throat:   No oral lesions, no thrush, oral mucosa moist   Neck:   No adenopathy, supple, trachea midline, no thyromegaly, no   carotid bruit, neck veins are flat when laying at 30 degrees.   Back:     No kyphosis present, no scoliosis present, no skin lesions, erythema or scars, no tenderness to percussion or palpation, range of motion normal   Lungs:    Decreased sounds bilateral bases.    Heart:    Regular rhythm and normal rate, normal S1 and S2, no murmur, no gallop, no rub, no click   Chest Wall:    No abnormalities observed   Abdomen:     Normal bowel sounds, no masses, no organomegaly, soft        non-tender, non-distended, no guarding, no rebound  tenderness   Extremities:  +1 lower extremity edema.   Pulses:   Pulses palpable and equal bilaterally. Normal radial, carotid, femoral, dorsalis pedis and posterior tibial pulses bilaterally. Normal abdominal aorta   Skin:  Psychiatric:   No bleeding, bruising or rash    Alert and oriented x 3, normal mood and affect   Lab Review:     Results from last 7 days   Lab Units 07/04/19  0631 07/03/19  0320   SODIUM mmol/L 141 143   POTASSIUM mmol/L 3.6 3.8   CHLORIDE mmol/L 103 108*   CO2 mmol/L 25.4 23.9   BUN mg/dL 33* 31*   CREATININE mg/dL 1.77* 1.64*   CALCIUM mg/dL 9.3 9.0   BILIRUBIN mg/dL  --  0.3   ALK PHOS U/L  --  72   ALT (SGPT) U/L  --  17   AST (SGOT) U/L  --  15   GLUCOSE mg/dL 44* 166*     Results from last 7 days   Lab Units 07/02/19  1814   TROPONIN T ng/mL 0.077*     Results from last 7 days   Lab Units 07/04/19  0631   WBC 10*3/mm3 7.88   HEMOGLOBIN g/dL 10.5*   HEMATOCRIT % 34.0*   PLATELETS 10*3/mm3 293     Results from last 7 days   Lab Units 07/04/19  0631 07/03/19  0320 07/02/19  2349   INR  1.99* 1.72* 1.63*                       I personally viewed and interpreted the patient's EKG/Telemetry data.  Normal sinus rhythm, inferior infarct,  old.  Unchanged from prior EKG.      Assessment and Plan:       This is a 64-year-old man with chronic systolic heart failure, who presents with acute on chronic decompensated heart failure exacerbation.  He has diuresed well with Lasix 60 IV twice daily.  I would continue this dose of 60mg IV BID.  The goal should be 1 to 2 L of urine output daily.  He is on Toprol 25 daily.  He is not on an ACE or an ARB as far as I can tell.  I presume this is for chronic kidney insufficiency.     CAD:  No further chest pain.  EKG is unchanged.  Continue beta-blocker.  Recent nuclear stress showed inferior infarct without ischemia.    CKD: Renal function around baseline.  Will continue diuretic as above.    CVA, remote: No cardioembolic source is been identified based on prior testing.  Is on warfarin as an outpatient.  INR is currently subtherapeutic at 1.99.      Thank you for including me in the care of this patient. Will continue to follow. Please call with any further questions or concerns.     Maude Cervantes MD  07/04/19  10:10 AM

## 2019-07-04 NOTE — PLAN OF CARE
Problem: Patient Care Overview  Goal: Plan of Care Review  Outcome: Ongoing (interventions implemented as appropriate)   07/04/19 0043   Coping/Psychosocial   Plan of Care Reviewed With patient   Plan of Care Review   Progress improving   OTHER   Outcome Summary No significant changes overnight. On 2LO2. Pt taking shallow breaths w/ pursed lips. Gave pt IS and encouraged T/C/D. BLE edema. Pt remains on IV lasix. Blood sugar dropped this am to 45. Tx and back up to 70s.  VSS. afebrile. NSR.      Goal: Individualization and Mutuality  Outcome: Ongoing (interventions implemented as appropriate)    Goal: Discharge Needs Assessment  Outcome: Ongoing (interventions implemented as appropriate)    Goal: Interprofessional Rounds/Family Conf  Outcome: Ongoing (interventions implemented as appropriate)      Problem: Fall Risk (Adult)  Goal: Identify Related Risk Factors and Signs and Symptoms  Outcome: Ongoing (interventions implemented as appropriate)    Goal: Absence of Fall  Outcome: Ongoing (interventions implemented as appropriate)      Problem: Cardiac Output Decreased (Adult)  Goal: Identify Related Risk Factors and Signs and Symptoms  Outcome: Ongoing (interventions implemented as appropriate)    Goal: Effective Tissue Perfusion  Outcome: Ongoing (interventions implemented as appropriate)      Problem: Fluid Volume Excess (Adult)  Goal: Identify Related Risk Factors and Signs and Symptoms  Outcome: Ongoing (interventions implemented as appropriate)    Goal: Optimal Fluid Balance  Outcome: Ongoing (interventions implemented as appropriate)

## 2019-07-04 NOTE — PROGRESS NOTES
Pharmacy Consult: Warfarin Dosing/ Monitoring    Carlito Mo is a 64 y.o. male, estimated creatinine clearance is 49.4 mL/min (A) (by C-G formula based on SCr of 1.77 mg/dL (H)). weighing 82.9 kg (182 lb 12.8 oz).     has a past medical history of Acquired hypothyroidism, Acute congestive heart failure (CMS/[facility]), Acute respiratory failure with hypoxia (CMS/[facility]), Acute sinusitis, SYLVAIN (acute kidney injury) (CMS/[facility]), Anemia, Arthritis, CAD (coronary artery disease), Cancer (CMS/[facility]), Chronic combined systolic and diastolic congestive heart failure (CMS/[facility]), Chronic ischemic heart disease, CKD (chronic kidney disease), COPD (chronic obstructive pulmonary disease) (CMS/[facility]), Depression, Diabetes mellitus type 2, uncontrolled, without complications (CMS/[facility]), Disease of thyroid gland, Fatigue, Frequent PVCs, Heart attack (CMS/[facility]), History of coronary artery disease, History of transfusion, Hyperglycemia, Hyperlipidemia, Hypertension, Hypertensive encephalopathy, Mental status change, Pleural effusion, Pneumonia, RBBB (right bundle branch block), Screening for prostate cancer (2011), Stroke (CMS/[facility]), TIA (transient ischemic attack), Type 2 diabetes mellitus (CMS/[facility]), Urinary retention, and Vitamin D deficiency.    Social History     Tobacco Use   • Smoking status: Never Smoker   • Smokeless tobacco: Never Used   • Tobacco comment: daily caffiene   Substance Use Topics   • Alcohol use: No   • Drug use: No       Results from last 7 days   Lab Units 07/04/19  0631 07/03/19  0320 07/02/19  2349 07/02/19  1814   INR  1.99* 1.72* 1.63* 1.62*   HEMOGLOBIN g/dL 10.5* 9.2*  --  10.1*   HEMATOCRIT % 34.0* 30.2*  --  33.4*   PLATELETS 10*3/mm3 293 220  --  278     Results from last 7 days   Lab Units 07/04/19  0631 07/03/19  0320 07/02/19  1814   SODIUM mmol/L 141 143 146*   POTASSIUM mmol/L 3.6 3.8 4.3   CHLORIDE mmol/L 103 108* 111*   CO2 mmol/L 25.4 23.9 22.8   BUN mg/dL 33* 31* 31*   CREATININE mg/dL 1.77* 1.64* 1.59*    CALCIUM mg/dL 9.3 9.0 9.5   BILIRUBIN mg/dL  --  0.3  --    ALK PHOS U/L  --  72  --    ALT (SGPT) U/L  --  17  --    AST (SGOT) U/L  --  15  --    GLUCOSE mg/dL 44* 166* 119*     Anticoagulation history: Per [facility] anticoagulation clinic visit note on 6/20/19 - home dose of warfarin is 8 mg every Mon, Thu; 6 mg all other days (weekly dose = 46 mg)    Hospital Anticoagulation:  Consulting provider: Dr. Awad  Start date: 7/2/19  Indication: CVA  Target INR: 2-3  Expected duration: indefinite  Bridge Therapy: none    Date 6/20 7/2 7/3 7/4          INR 2.4 1.63 1.72 1.99          Warfarin dose  6.5mg 7.5mg 7.5 mg            Potential drug interactions:   Aspirin and Levothyroxine - can increase INR (both are home meds)    Relevant nutrition status: Diet Regular; Cardiac, Consistent Carbohydrate, Renal     Education complete?/ Date: not yet    Assessment/Plan:  INR 1.99 today - will give 7.5 mg dose today and may consider changing to home dose of 8 mg every Mon, Thu; 6 mg all other days tomorrow.  Monitor INR daily   Follow up tomorrow am.    Pharmacy will continue to follow until discharge or discontinuation of warfarin.     Lubna Nieves, PharmD, BCPS

## 2019-07-04 NOTE — PLAN OF CARE
Problem: Patient Care Overview  Goal: Plan of Care Review  Outcome: Ongoing (interventions implemented as appropriate)   07/04/19 3150   Coping/Psychosocial   Plan of Care Reviewed With patient   Plan of Care Review   Progress improving   OTHER   Outcome Summary lasix remains iv. diuresing well. no complaints. safety maintained will continue to monitor. resting comfortably.        Problem: Fall Risk (Adult)  Goal: Identify Related Risk Factors and Signs and Symptoms  Outcome: Outcome(s) achieved Date Met: 07/04/19    Goal: Absence of Fall  Outcome: Ongoing (interventions implemented as appropriate)      Problem: Cardiac Output Decreased (Adult)  Goal: Identify Related Risk Factors and Signs and Symptoms  Outcome: Outcome(s) achieved Date Met: 07/04/19    Goal: Effective Tissue Perfusion  Outcome: Ongoing (interventions implemented as appropriate)      Problem: Fluid Volume Excess (Adult)  Goal: Identify Related Risk Factors and Signs and Symptoms  Outcome: Outcome(s) achieved Date Met: 07/04/19    Goal: Optimal Fluid Balance  Outcome: Ongoing (interventions implemented as appropriate)

## 2019-07-05 LAB
ANION GAP SERPL CALCULATED.3IONS-SCNC: 11.5 MMOL/L (ref 5–15)
BUN BLD-MCNC: 35 MG/DL (ref 8–23)
BUN/CREAT SERPL: 18.6 (ref 7–25)
CALCIUM SPEC-SCNC: 8.6 MG/DL (ref 8.6–10.5)
CHLORIDE SERPL-SCNC: 102 MMOL/L (ref 98–107)
CO2 SERPL-SCNC: 28.5 MMOL/L (ref 22–29)
CREAT BLD-MCNC: 1.88 MG/DL (ref 0.76–1.27)
GFR SERPL CREATININE-BSD FRML MDRD: 36 ML/MIN/1.73
GLUCOSE BLD-MCNC: 73 MG/DL (ref 65–99)
GLUCOSE BLDC GLUCOMTR-MCNC: 103 MG/DL (ref 70–130)
GLUCOSE BLDC GLUCOMTR-MCNC: 116 MG/DL (ref 70–130)
GLUCOSE BLDC GLUCOMTR-MCNC: 132 MG/DL (ref 70–130)
GLUCOSE BLDC GLUCOMTR-MCNC: 73 MG/DL (ref 70–130)
INR PPP: 2.26 (ref 0.9–1.1)
POTASSIUM BLD-SCNC: 4 MMOL/L (ref 3.5–5.2)
PROTHROMBIN TIME: 24.6 SECONDS (ref 11.7–14.2)
SODIUM BLD-SCNC: 142 MMOL/L (ref 136–145)

## 2019-07-05 PROCEDURE — 63710000001 INSULIN GLARGINE PER 5 UNITS: Performed by: HOSPITALIST

## 2019-07-05 PROCEDURE — 99233 SBSQ HOSP IP/OBS HIGH 50: CPT | Performed by: INTERNAL MEDICINE

## 2019-07-05 PROCEDURE — 85610 PROTHROMBIN TIME: CPT | Performed by: INTERNAL MEDICINE

## 2019-07-05 PROCEDURE — 63710000001 INSULIN LISPRO (HUMAN) PER 5 UNITS: Performed by: INTERNAL MEDICINE

## 2019-07-05 PROCEDURE — 94799 UNLISTED PULMONARY SVC/PX: CPT

## 2019-07-05 PROCEDURE — 25010000002 FUROSEMIDE PER 20 MG: Performed by: INTERNAL MEDICINE

## 2019-07-05 PROCEDURE — 82962 GLUCOSE BLOOD TEST: CPT

## 2019-07-05 PROCEDURE — 80048 BASIC METABOLIC PNL TOTAL CA: CPT | Performed by: HOSPITALIST

## 2019-07-05 RX ORDER — WARFARIN SODIUM 4 MG/1
8 TABLET ORAL
Status: DISCONTINUED | OUTPATIENT
Start: 2019-07-08 | End: 2019-07-06 | Stop reason: HOSPADM

## 2019-07-05 RX ORDER — WARFARIN SODIUM 6 MG/1
6 TABLET ORAL
Status: DISCONTINUED | OUTPATIENT
Start: 2019-07-05 | End: 2019-07-06 | Stop reason: HOSPADM

## 2019-07-05 RX ORDER — FUROSEMIDE 40 MG/1
40 TABLET ORAL DAILY
Status: DISCONTINUED | OUTPATIENT
Start: 2019-07-05 | End: 2019-07-06 | Stop reason: HOSPADM

## 2019-07-05 RX ORDER — IPRATROPIUM BROMIDE AND ALBUTEROL SULFATE 2.5; .5 MG/3ML; MG/3ML
3 SOLUTION RESPIRATORY (INHALATION) EVERY 4 HOURS PRN
Status: DISCONTINUED | OUTPATIENT
Start: 2019-07-05 | End: 2019-07-06 | Stop reason: HOSPADM

## 2019-07-05 RX ADMIN — METOPROLOL SUCCINATE 25 MG: 25 TABLET, FILM COATED, EXTENDED RELEASE ORAL at 08:44

## 2019-07-05 RX ADMIN — LEVOTHYROXINE SODIUM 75 MCG: 75 TABLET ORAL at 05:37

## 2019-07-05 RX ADMIN — INSULIN LISPRO 2 UNITS: 100 INJECTION, SOLUTION INTRAVENOUS; SUBCUTANEOUS at 11:43

## 2019-07-05 RX ADMIN — AMLODIPINE BESYLATE 5 MG: 5 TABLET ORAL at 08:44

## 2019-07-05 RX ADMIN — INSULIN GLARGINE 8 UNITS: 100 INJECTION, SOLUTION SUBCUTANEOUS at 21:17

## 2019-07-05 RX ADMIN — FERROUS SULFATE TAB 325 MG (65 MG ELEMENTAL FE) 325 MG: 325 (65 FE) TAB at 08:44

## 2019-07-05 RX ADMIN — FUROSEMIDE 60 MG: 10 INJECTION, SOLUTION INTRAMUSCULAR; INTRAVENOUS at 08:44

## 2019-07-05 RX ADMIN — INSULIN LISPRO 2 UNITS: 100 INJECTION, SOLUTION INTRAVENOUS; SUBCUTANEOUS at 17:13

## 2019-07-05 RX ADMIN — SODIUM CHLORIDE, PRESERVATIVE FREE 3 ML: 5 INJECTION INTRAVENOUS at 21:18

## 2019-07-05 RX ADMIN — FUROSEMIDE 40 MG: 40 TABLET ORAL at 16:34

## 2019-07-05 RX ADMIN — BUPROPION HYDROCHLORIDE 150 MG: 150 TABLET, FILM COATED, EXTENDED RELEASE ORAL at 08:44

## 2019-07-05 RX ADMIN — SODIUM CHLORIDE, PRESERVATIVE FREE 3 ML: 5 INJECTION INTRAVENOUS at 08:45

## 2019-07-05 RX ADMIN — VITAMIN D, TAB 1000IU (100/BT) 1000 UNITS: 25 TAB at 08:44

## 2019-07-05 RX ADMIN — ASPIRIN 81 MG: 81 TABLET, COATED ORAL at 05:37

## 2019-07-05 RX ADMIN — IPRATROPIUM BROMIDE AND ALBUTEROL SULFATE 3 ML: 2.5; .5 SOLUTION RESPIRATORY (INHALATION) at 06:52

## 2019-07-05 RX ADMIN — WARFARIN SODIUM 6 MG: 6 TABLET ORAL at 17:14

## 2019-07-05 RX ADMIN — TAMSULOSIN HYDROCHLORIDE 0.4 MG: 0.4 CAPSULE ORAL at 21:17

## 2019-07-05 RX ADMIN — LOSARTAN POTASSIUM 50 MG: 50 TABLET, FILM COATED ORAL at 08:44

## 2019-07-05 RX ADMIN — ATORVASTATIN CALCIUM 40 MG: 20 TABLET, FILM COATED ORAL at 21:17

## 2019-07-05 NOTE — PROGRESS NOTES
"Patient Name: Carlito Mo  :1955  64 y.o.      Patient Care Team:  Jyoti Reynolds MD as PCP - General (Internal Medicine)  Amber Murguia MD as Consulting Physician (Cardiology)  Sera La RPH as Pharmacist    Interval History:   Diuresed well.    Subjective:  Following for acute on chronic systolic congestive heart failure    Objective   Vital Signs  Temp:  [97.3 °F (36.3 °C)-98 °F (36.7 °C)] 97.5 °F (36.4 °C)  Heart Rate:  [66-74] 66  Resp:  [16-20] 20  BP: (114-131)/(67-75) 130/75    Intake/Output Summary (Last 24 hours) at 2019 1000  Last data filed at 2019 0800  Gross per 24 hour   Intake 360 ml   Output 2600 ml   Net -2240 ml     Flowsheet Rows      First Filed Value   Admission Height  182.9 cm (72\") Documented at 2019 1757   Admission Weight  86.2 kg (190 lb) Documented at 2019 1815          Physical Exam:   General Appearance:    Alert, cooperative, in no acute distress   Lungs:     Clear to auscultation.  Normal respiratory effort and rate.      Heart:    Regular rhythm and normal rate, normal S1 and S2, no murmurs, gallops or rubs.     Chest Wall:    No abnormalities observed   Abdomen:     Soft, nontender, positive bowel sounds.     Extremities:   no cyanosis, clubbing or edema.  No marked joint deformities.  Adequate musculoskeletal strength.       Results Review:    Results from last 7 days   Lab Units 19  0432   SODIUM mmol/L 142   POTASSIUM mmol/L 4.0   CHLORIDE mmol/L 102   CO2 mmol/L 28.5   BUN mg/dL 35*   CREATININE mg/dL 1.88*   GLUCOSE mg/dL 73   CALCIUM mg/dL 8.6     Results from last 7 days   Lab Units 19  1814   TROPONIN T ng/mL 0.077*     Results from last 7 days   Lab Units 19  0631   WBC 10*3/mm3 7.88   HEMOGLOBIN g/dL 10.5*   HEMATOCRIT % 34.0*   PLATELETS 10*3/mm3 293     Results from last 7 days   Lab Units 19  0432 19  0631 19  0320   INR  2.26* 1.99* 1.72*                     Medication Review:     amLODIPine " 5 mg Oral Daily   aspirin 81 mg Oral QAM   atorvastatin 40 mg Oral Nightly   buPROPion  mg Oral Daily   cholecalciferol 1,000 Units Oral Daily   ferrous sulfate 325 mg Oral Daily With Breakfast   furosemide 60 mg Intravenous BID   insulin glargine 8 Units Subcutaneous Nightly   insulin lispro 0-7 Units Subcutaneous 4x Daily With Meals & Nightly   insulin lispro 2 Units Subcutaneous TID AC   ipratropium-albuterol 3 mL Nebulization 4x Daily - RT   levothyroxine 75 mcg Oral Q AM   losartan 50 mg Oral Daily   metoprolol succinate XL 25 mg Oral Q24H   sodium chloride 3 mL Intravenous Q12H   tamsulosin 0.4 mg Oral Nightly   [START ON 7/8/2019] warfarin 8 mg Oral Once per day on Mon Thu   And      warfarin 6 mg Oral Once per day on Sun Tue Wed Fri Sat          Pharmacy to dose warfarin        Assessment/Plan     1.  Acute on chronic systolic congestive heart failure.  BUN, creatinine and CO2 are climbing.   2.  Coronary artery disease with a history of an apical infarct.  Chronically elevated troponin.  3.  Chronic kidney disease  4.  History of stroke.  On warfarin.  5.  Diabetes  6.  Hypothyroid  7.  Hypertension    Stop IV diuretics today and restart oral diuretics.  Hopefully home tomorrow.    Amber Murguia MD, [facility]  07/05/19  10:00 AM

## 2019-07-05 NOTE — PROGRESS NOTES
"DAILY PROGRESS NOTE  [facility]    Patient Identification:  Name: Carlito Mo  Age: 64 y.o.  Sex: male  :  1955  MRN: [id number]         Primary Care Physician: Jyoti Reynolds MD      Subjective  No c/o    Objective:  General Appearance:  Comfortable, well-appearing, in no acute distress and not in pain.    Vital signs: (most recent): Blood pressure 124/76, pulse 67, temperature 97.6 °F (36.4 °C), temperature source Oral, resp. rate 16, height 182.9 cm (72\"), weight 82.9 kg (182 lb 12.8 oz), SpO2 95 %.    Lungs:  Normal effort and normal respiratory rate.  Breath sounds clear to auscultation.    Heart: Normal rate.  Regular rhythm.    Neurological: Patient is alert and oriented to person, place and time.    Skin:  Warm and dry.                Vital signs in last 24 hours:  Temp:  [97.5 °F (36.4 °C)-98 °F (36.7 °C)] 97.6 °F (36.4 °C)  Heart Rate:  [66-74] 67  Resp:  [16-20] 16  BP: (114-130)/(68-76) 124/76    Intake/Output:    Intake/Output Summary (Last 24 hours) at 2019 191  Last data filed at 2019 1751  Gross per 24 hour   Intake 240 ml   Output 2275 ml   Net -2035 ml         Results from last 7 days   Lab Units 19  0631 19  0320 19  1814   WBC 10*3/mm3 7.88 6.10 6.63   HEMOGLOBIN g/dL 10.5* 9.2* 10.1*   PLATELETS 10*3/mm3 293 220 278     Results from last 7 days   Lab Units 19  0432 19  0631 19  0320 19  1814   SODIUM mmol/L 142 141 143 146*   POTASSIUM mmol/L 4.0 3.6 3.8 4.3   CHLORIDE mmol/L 102 103 108* 111*   CO2 mmol/L 28.5 25.4 23.9 22.8   BUN mg/dL 35* 33* 31* 31*   CREATININE mg/dL 1.88* 1.77* 1.64* 1.59*   GLUCOSE mg/dL 73 44* 166* 119*   Estimated Creatinine Clearance: 46.5 mL/min (A) (by C-G formula based on SCr of 1.88 mg/dL (H)).  Results from last 7 days   Lab Units 19  0432 19  0631 19  0320 19  1814   CALCIUM mg/dL 8.6 9.3 9.0 9.5   ALBUMIN g/dL  --   --  3.40*  --      Results from last 7 days "   Lab Units 07/03/19  0320   ALBUMIN g/dL 3.40*   BILIRUBIN mg/dL 0.3   ALK PHOS U/L 72   AST (SGOT) U/L 15   ALT (SGPT) U/L 17       Assessment:  Acute on chronic congestive heart failure:  Switch to PO diuretics today. Cardiology input appreciated.     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     CKD (chronic kidney disease) stage 3, GFR 30-59 ml/min:  Creat about his baseline.     Acquired hypothyroidism:     Acute respiratory failure with hypoxia:  Wean O2.     YAW on CPAP    Elevated troponin    Anemia - VIJAYA/ACD:       Plan:  Please see above.  Probably D/C tomorrow.     Flo Sherman MD  7/5/2019  7:19 PM

## 2019-07-05 NOTE — PLAN OF CARE
Problem: Patient Care Overview  Goal: Plan of Care Review  Outcome: Ongoing (interventions implemented as appropriate)   07/05/19 1602   Coping/Psychosocial   Plan of Care Reviewed With patient;spouse   Plan of Care Review   Progress improving   OTHER   Outcome Summary diuretic changed to po. hopefully home tomorrow. room air. no complaints. resting comfortably with spouse at side. safety maintained will continue to monitor.       Problem: Fall Risk (Adult)  Goal: Absence of Fall  Outcome: Ongoing (interventions implemented as appropriate)      Problem: Cardiac Output Decreased (Adult)  Goal: Effective Tissue Perfusion  Outcome: Ongoing (interventions implemented as appropriate)      Problem: Fluid Volume Excess (Adult)  Goal: Optimal Fluid Balance  Outcome: Ongoing (interventions implemented as appropriate)

## 2019-07-05 NOTE — PLAN OF CARE
Problem: Patient Care Overview  Goal: Plan of Care Review  Outcome: Ongoing (interventions implemented as appropriate)    Goal: Individualization and Mutuality  Outcome: Ongoing (interventions implemented as appropriate)    Goal: Discharge Needs Assessment  Outcome: Ongoing (interventions implemented as appropriate)    Goal: Interprofessional Rounds/Family Conf  Outcome: Ongoing (interventions implemented as appropriate)      Problem: Fall Risk (Adult)  Goal: Absence of Fall  Outcome: Ongoing (interventions implemented as appropriate)      Problem: Cardiac Output Decreased (Adult)  Goal: Effective Tissue Perfusion  Outcome: Ongoing (interventions implemented as appropriate)      Problem: Fluid Volume Excess (Adult)  Goal: Optimal Fluid Balance  Outcome: Ongoing (interventions implemented as appropriate)

## 2019-07-05 NOTE — PROGRESS NOTES
Pharmacy Consult: Warfarin Dosing/ Monitoring    Carlito Mo is a 64 y.o. male, estimated creatinine clearance is 46.5 mL/min (A) (by C-G formula based on SCr of 1.88 mg/dL (H)). weighing 82.9 kg (182 lb 12.8 oz).     has a past medical history of Acquired hypothyroidism, Acute congestive heart failure (CMS/[facility]), Acute respiratory failure with hypoxia (CMS/[facility]), Acute sinusitis, SYLVAIN (acute kidney injury) (CMS/[facility]), Anemia, Arthritis, CAD (coronary artery disease), Cancer (CMS/[facility]), Chronic combined systolic and diastolic congestive heart failure (CMS/[facility]), Chronic ischemic heart disease, CKD (chronic kidney disease), COPD (chronic obstructive pulmonary disease) (CMS/[facility]), Depression, Diabetes mellitus type 2, uncontrolled, without complications (CMS/[facility]), Disease of thyroid gland, Fatigue, Frequent PVCs, Heart attack (CMS/[facility]), History of coronary artery disease, History of transfusion, Hyperglycemia, Hyperlipidemia, Hypertension, Hypertensive encephalopathy, Mental status change, Pleural effusion, Pneumonia, RBBB (right bundle branch block), Screening for prostate cancer (2011), Stroke (CMS/[facility]), TIA (transient ischemic attack), Type 2 diabetes mellitus (CMS/[facility]), Urinary retention, and Vitamin D deficiency.    Social History     Tobacco Use   • Smoking status: Never Smoker   • Smokeless tobacco: Never Used   • Tobacco comment: daily caffiene   Substance Use Topics   • Alcohol use: No   • Drug use: No       Results from last 7 days   Lab Units 07/05/19 0432 07/04/19  0631 07/03/19  0320 07/02/19  2349 07/02/19  1814   INR  2.26* 1.99* 1.72* 1.63* 1.62*   HEMOGLOBIN g/dL  --  10.5* 9.2*  --  10.1*   HEMATOCRIT %  --  34.0* 30.2*  --  33.4*   PLATELETS 10*3/mm3  --  293 220  --  278     Results from last 7 days   Lab Units 07/05/19  0432 07/04/19  0631 07/03/19  0320   SODIUM mmol/L 142 141 143   POTASSIUM mmol/L 4.0 3.6 3.8   CHLORIDE mmol/L 102 103 108*   CO2 mmol/L 28.5 25.4 23.9   BUN mg/dL 35* 33* 31*    CREATININE mg/dL 1.88* 1.77* 1.64*   CALCIUM mg/dL 8.6 9.3 9.0   BILIRUBIN mg/dL  --   --  0.3   ALK PHOS U/L  --   --  72   ALT (SGPT) U/L  --   --  17   AST (SGOT) U/L  --   --  15   GLUCOSE mg/dL 73 44* 166*     Anticoagulation history: Per [facility] anticoagulation clinic visit note on 6/20/19 - home dose of warfarin is 8 mg every Mon, Thu; 6 mg all other days (weekly dose = 46 mg)    Hospital Anticoagulation:  Consulting provider: Dr. Awad  Start date: 7/2/19  Indication: CVA  Target INR: 2-3  Expected duration: indefinite  Bridge Therapy: none    Date 6/20 7/2 7/3 7/4 7/5         INR 2.4 1.63 1.72 1.99 2.26         Warfarin dose  6.5mg 7.5mg 7.5 mg            Potential drug interactions:   Aspirin and Levothyroxine - can increase INR (both are home meds)    Relevant nutrition status: Diet Regular; Cardiac, Consistent Carbohydrate, Renal     Education complete?/ Date: not yet    Assessment/Plan:  INR 2.26 today at goal 2-3 - will  resume home dose of 8 mg every Mon, Thu; 6 mg all other days   Monitor INR daily   Follow up tomorrow am.    Pharmacy will continue to follow until discharge or discontinuation of warfarin.     Yo Verma [facility]

## 2019-07-06 ENCOUNTER — TELEPHONE (OUTPATIENT)
Dept: CARDIOLOGY | Facility: CLINIC | Age: 64
End: 2019-07-06

## 2019-07-06 VITALS
TEMPERATURE: 97.8 F | RESPIRATION RATE: 16 BRPM | HEART RATE: 65 BPM | HEIGHT: 72 IN | BODY MASS INDEX: 23.8 KG/M2 | SYSTOLIC BLOOD PRESSURE: 136 MMHG | WEIGHT: 175.7 LBS | DIASTOLIC BLOOD PRESSURE: 84 MMHG | OXYGEN SATURATION: 93 %

## 2019-07-06 LAB
ANION GAP SERPL CALCULATED.3IONS-SCNC: 11.5 MMOL/L (ref 5–15)
BUN BLD-MCNC: 34 MG/DL (ref 8–23)
BUN/CREAT SERPL: 17.7 (ref 7–25)
CALCIUM SPEC-SCNC: 8.9 MG/DL (ref 8.6–10.5)
CHLORIDE SERPL-SCNC: 99 MMOL/L (ref 98–107)
CO2 SERPL-SCNC: 28.5 MMOL/L (ref 22–29)
CREAT BLD-MCNC: 1.92 MG/DL (ref 0.76–1.27)
GFR SERPL CREATININE-BSD FRML MDRD: 35 ML/MIN/1.73
GLUCOSE BLD-MCNC: 100 MG/DL (ref 65–99)
GLUCOSE BLDC GLUCOMTR-MCNC: 110 MG/DL (ref 70–130)
GLUCOSE BLDC GLUCOMTR-MCNC: 168 MG/DL (ref 70–130)
GLUCOSE BLDC GLUCOMTR-MCNC: 62 MG/DL (ref 70–130)
INR PPP: 2.33 (ref 0.9–1.1)
POTASSIUM BLD-SCNC: 4 MMOL/L (ref 3.5–5.2)
PROTHROMBIN TIME: 25.3 SECONDS (ref 11.7–14.2)
SODIUM BLD-SCNC: 139 MMOL/L (ref 136–145)

## 2019-07-06 PROCEDURE — 85610 PROTHROMBIN TIME: CPT | Performed by: INTERNAL MEDICINE

## 2019-07-06 PROCEDURE — 63710000001 INSULIN LISPRO (HUMAN) PER 5 UNITS: Performed by: HOSPITALIST

## 2019-07-06 PROCEDURE — 82962 GLUCOSE BLOOD TEST: CPT

## 2019-07-06 PROCEDURE — 80048 BASIC METABOLIC PNL TOTAL CA: CPT | Performed by: HOSPITALIST

## 2019-07-06 PROCEDURE — 99232 SBSQ HOSP IP/OBS MODERATE 35: CPT | Performed by: NURSE PRACTITIONER

## 2019-07-06 PROCEDURE — 63710000001 INSULIN LISPRO (HUMAN) PER 5 UNITS: Performed by: INTERNAL MEDICINE

## 2019-07-06 RX ORDER — FUROSEMIDE 40 MG/1
40 TABLET ORAL DAILY
Start: 2019-07-06 | End: 2020-06-04 | Stop reason: HOSPADM

## 2019-07-06 RX ORDER — INSULIN GLARGINE 100 [IU]/ML
8 INJECTION, SOLUTION SUBCUTANEOUS NIGHTLY
Status: ON HOLD
Start: 2019-07-06 | End: 2021-05-24 | Stop reason: SDUPTHER

## 2019-07-06 RX ADMIN — METOPROLOL SUCCINATE 25 MG: 25 TABLET, FILM COATED, EXTENDED RELEASE ORAL at 09:00

## 2019-07-06 RX ADMIN — VITAMIN D, TAB 1000IU (100/BT) 1000 UNITS: 25 TAB at 09:01

## 2019-07-06 RX ADMIN — LOSARTAN POTASSIUM 50 MG: 50 TABLET, FILM COATED ORAL at 09:01

## 2019-07-06 RX ADMIN — ASPIRIN 81 MG: 81 TABLET, COATED ORAL at 06:14

## 2019-07-06 RX ADMIN — FUROSEMIDE 40 MG: 40 TABLET ORAL at 09:01

## 2019-07-06 RX ADMIN — SODIUM CHLORIDE, PRESERVATIVE FREE 3 ML: 5 INJECTION INTRAVENOUS at 09:02

## 2019-07-06 RX ADMIN — LEVOTHYROXINE SODIUM 75 MCG: 75 TABLET ORAL at 06:14

## 2019-07-06 RX ADMIN — BUPROPION HYDROCHLORIDE 150 MG: 150 TABLET, FILM COATED, EXTENDED RELEASE ORAL at 09:01

## 2019-07-06 RX ADMIN — FERROUS SULFATE TAB 325 MG (65 MG ELEMENTAL FE) 325 MG: 325 (65 FE) TAB at 09:01

## 2019-07-06 RX ADMIN — INSULIN LISPRO 2 UNITS: 100 INJECTION, SOLUTION INTRAVENOUS; SUBCUTANEOUS at 11:26

## 2019-07-06 RX ADMIN — INSULIN LISPRO 2 UNITS: 100 INJECTION, SOLUTION INTRAVENOUS; SUBCUTANEOUS at 11:16

## 2019-07-06 RX ADMIN — AMLODIPINE BESYLATE 5 MG: 5 TABLET ORAL at 09:01

## 2019-07-06 NOTE — DISCHARGE SUMMARY
PHYSICIAN DISCHARGE SUMMARY                                                                        [facility]    Patient Identification:  Name: Carlito Mo  Age: 64 y.o.  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  Presently 1.92.    Acquired hypothyroidism:     Acute respiratory failure with hypoxia:      YAW on CPAP    Elevated troponin    Anemia - VIJAYA/ACD:      Hospital Course:  64-year-old gentleman with a history of CHF with last echocardiogram showing a ejection fraction of 43% with associated severe hypokinesia of the distal anterior septum and anterior apex.  He presents with a history of increased leg swelling as well as shortness of breath over the previous week.  Please H&P for full details.  Work-up included a chest x-ray which showed increase in his previously noted pleural effusions.  BNP was 5677 which is significantly above his baseline.  Troponin was negative and EKG was nonacute.  Patient was admitted and his cardiologist consulted in case.  He was diuresed initially with intravenous loop diuretics.  He responded very nicely and yesterday was switched with oral loop diuretics.  He is continued to show stability on this and feels well this morning.  He does have chronic kidney disease with baseline creatinine bouncing around anywhere between 1.7 and 2.0.  This morning is 1.92.  He will be discharged on Lasix 40 mg daily which is increased from 20 prior to admission.  I will have his renal function and lites  "rechecked on Tuesday as well as a follow-up INR as he is on chronic Coumadin.      Consults:     Consults     Date and Time Order Name Status Description    7/3/2019 9745 Inpatient Cardiology Consult Completed     7/2/2019 3290 [facility] (on-call MD unless specified) Details Completed             Discharge Exam:  Physical Exam   General Appearance:  Comfortable, well-appearing, in no acute distress and not in pain.    Vital signs: (most recent): Blood pressure 123/71, pulse 62, temperature 97.9 °F (36.6 °C), temperature source Oral, resp. rate 16, height 182.9 cm (72\"), weight 79.7 kg (175 lb 11.2 oz), SpO2 95 %.    Lungs:  Normal effort and normal respiratory rate.  Breath sounds clear to auscultation.    Heart: Normal rate.  Regular rhythm.    Extremities: There is no dependent edema.    Neurological: Patient is alert and oriented to person, place and time.    Skin:  Warm and dry.         Disposition:  Home    Patient Instructions:      Discharge Medications      Changes to Medications      Instructions Start Date   furosemide 40 MG tablet  Commonly known as:  LASIX  What changed:  how much to take   40 mg, Oral, Daily      Insulin Glargine 100 UNIT/ML injection pen  Commonly known as:  BASAGLAR KWIKPEN  What changed:  how much to take   8 Units, Subcutaneous, Nightly, Patient says he does not take consistently         Continue These Medications      Instructions Start Date   amLODIPine 5 MG tablet  Commonly known as:  NORVASC   5 mg, Oral, Daily      APIDRA 100 UNIT/ML injection  Generic drug:  insulin glulisine   2 Units, Subcutaneous, 3 Times Daily Before Meals, Patient says he does not take consistently      aspirin 81 MG EC tablet   81 mg, Oral, Every Morning      atorvastatin 40 MG tablet  Commonly known as:  LIPITOR   40 mg, Oral, Nightly      buPROPion  MG 24 hr tablet  Commonly known as:  WELLBUTRIN XL   150 mg, Oral, Daily      ferrous sulfate 325 (65 FE) MG tablet   325 mg, Oral, Daily With Breakfast    "   ipratropium-albuterol  MCG/ACT inhaler  Commonly known as:  COMBIVENT RESPIMAT   1 puff, Inhalation, 4 Times Daily PRN      levothyroxine 75 MCG tablet  Commonly known as:  SYNTHROID, LEVOTHROID   75 mcg, Oral, Daily      losartan 50 MG tablet  Commonly known as:  COZAAR   50 mg, Oral, Daily      metoprolol succinate XL 25 MG 24 hr tablet  Commonly known as:  TOPROL-XL   25 mg, Oral, Every 24 Hours Scheduled      tamsulosin 0.4 MG capsule 24 hr capsule  Commonly known as:  FLOMAX   1 capsule, Oral, Nightly      vitamin D3 5000 units capsule capsule   5,000 Units, Oral, Daily      warfarin 2 MG tablet  Commonly known as:  COUMADIN   6 mg, Oral, Daily, Patient takes 8 mg every Mon, Thu; 6 mg all other days           Diet Instructions     Diet: Consistent Carbohydrate, Cardiac      Discharge Diet:   Consistent Carbohydrate  Cardiac           Future Appointments   Date Time Provider Department Center   7/25/2019  2:30 PM Albert Wing MD [facility] None   7/29/2019  1:45 PM [facility] 1 [facility]   7/31/2019  1:30 PM Alisia Paula APRN [facility] None   8/30/2019 11:40 AM Amber Murguia MD [facility] None     Additional Instructions for the Follow-ups that You Need to Schedule     Discharge Follow-up with PCP   As directed       Currently Documented PCP:    Jyoti Reynolds MD    PCP Phone Number:    [phone]     Follow Up Details:  1 week         Discharge Follow-up with Specialty: Cardiology, Nephrology   As directed      Specialty:  Cardiology, Nephrology    Follow Up Details:  As directed.         Basic metabolic panel    Jul 09, 2019 (Approximate)      Protime-INR    Jul 09, 2019 (Approximate)      Basic Metabolic Panel    Jul 11, 2019 (Approximate)        Follow-up Information     Nadine Blake, JULIO C, APRN. Schedule an appointment as soon as possible for a visit in 1 week(s).    Specialties:  Nurse Practitioner, Cardiology  Contact information:  [address]  [facility] [address] [phone]             Amber Murguia MD Follow up on 8/30/2019.    Specialty:  Cardiology  Why:  Keep your 8/30/19 follow-up appointment at 11:40am with Dr. Murguia as scheduled.    Contact information:  [address]  [place] [address]  [phone]             Trevor Huddleston MD. Schedule an appointment as soon as possible for a visit in 1 week(s).    Specialty:  Nephrology  Contact information:  [address]  [place] [address]  [phone]             [facility] CARDIOLOGY Follow up in 1 week(s).    Specialty:  Cardiology  Why:  Call for appointment with APRN within one week after discharge.  Contact information:  [address] [facility] [address] [phone]           Jyoti Reynolds MD .    Specialty:  Internal Medicine  Why:  1 week  Contact information:  [address]  [facility] [address]  [phone]                 Discharge Order (From admission, onward)    Start     Ordered    07/06/19 1414  Discharge patient  Once     Expected Discharge Date:  07/06/19    Discharge Disposition:  Home or Self Care    Physician of Record for Attribution - Please select from Treatment Team:  FLO DUMONT [[id number]]    Review needed by CMO to determine Physician of Record:  No       Question Answer Comment   Physician of Record for Attribution - Please select from Treatment Team FLO DUMONT    Review needed by CMO to determine Physician of Record No        07/06/19 1420            Total time spent discharging patient including evaluation,post hospitalization follow up,  medication and post hospitalization instructions and education total time exceeds 30 minutes.    Signed:  Flo Dumont MD  7/6/2019  2:21 PM    EMR Dragon/Transcription disclaimer:   Much of this encounter note is an electronic transcription/translation of spoken language to printed text. The electronic translation of  spoken language may permit erroneous, or at times, nonsensical words or phrases to be inadvertently transcribed; Although I have reviewed the note for such errors, some may still exist.

## 2019-07-06 NOTE — PLAN OF CARE
Problem: Patient Care Overview  Goal: Plan of Care Review   07/06/19 1314   Coping/Psychosocial   Plan of Care Reviewed With patient   Plan of Care Review   Progress improving   OTHER   Outcome Summary Pt VSS. Pt blood glucose monitored closely. Pt continues to recieve diuretics per MD order. will continune to monitor pt.     Goal: Individualization and Mutuality  Outcome: Ongoing (interventions implemented as appropriate)    Goal: Discharge Needs Assessment  Outcome: Ongoing (interventions implemented as appropriate)    Goal: Interprofessional Rounds/Family Conf  Outcome: Ongoing (interventions implemented as appropriate)      Problem: Fall Risk (Adult)  Goal: Absence of Fall  Outcome: Ongoing (interventions implemented as appropriate)      Problem: Cardiac Output Decreased (Adult)  Goal: Effective Tissue Perfusion  Outcome: Ongoing (interventions implemented as appropriate)      Problem: Fluid Volume Excess (Adult)  Goal: Optimal Fluid Balance  Outcome: Ongoing (interventions implemented as appropriate)

## 2019-07-06 NOTE — PROGRESS NOTES
Pharmacy Consult: Warfarin Dosing/ Monitoring    Carlito Mo is a 64 y.o. male, estimated creatinine clearance is 43.8 mL/min (A) (by C-G formula based on SCr of 1.92 mg/dL (H)). weighing 79.7 kg (175 lb 11.2 oz).     has a past medical history of Acquired hypothyroidism, Acute congestive heart failure (CMS/[facility]), Acute respiratory failure with hypoxia (CMS/Formerly [facility]), Acute sinusitis, SYLVAIN (acute kidney injury) (CMS/Formerly [facility]), Anemia, Arthritis, CAD (coronary artery disease), Cancer (CMS/[facility]ly [facility]), Chronic combined systolic and diastolic congestive heart failure (CMS/[facility]), Chronic ischemic heart disease, CKD (chronic kidney disease), COPD (chronic obstructive pulmonary disease) (CMS/[facility]), Depression, Diabetes mellitus type 2, uncontrolled, without complications (CMS/[facility]), Disease of thyroid gland, Fatigue, Frequent PVCs, Heart attack (CMS/[facility]), History of coronary artery disease, History of transfusion, Hyperglycemia, Hyperlipidemia, Hypertension, Hypertensive encephalopathy, Mental status change, Pleural effusion, Pneumonia, RBBB (right bundle branch block), Screening for prostate cancer (2011), Stroke (CMS/[facility]), TIA (transient ischemic attack), Type 2 diabetes mellitus (CMS/[facility]ly [facility]), Urinary retention, and Vitamin D deficiency.    Social History     Tobacco Use   • Smoking status: Never Smoker   • Smokeless tobacco: Never Used   • Tobacco comment: daily caffiene   Substance Use Topics   • Alcohol use: No   • Drug use: No       Results from last 7 days   Lab Units 07/06/19  0308 07/05/19  0432 07/04/19 0631 07/03/19  0320 07/02/19  2349 07/02/19  1814   INR  2.33* 2.26* 1.99* 1.72* 1.63* 1.62*   HEMOGLOBIN g/dL  --   --  10.5* 9.2*  --  10.1*   HEMATOCRIT %  --   --  34.0* 30.2*  --  33.4*   PLATELETS 10*3/mm3  --   --  293 220  --  278     Results from last 7 days   Lab Units 07/06/19  0308 07/05/19  0432 07/04/19  0631 07/03/19  0320   SODIUM mmol/L 139 142 141 143   POTASSIUM mmol/L 4.0 4.0 3.6 3.8   CHLORIDE mmol/L 99 102 103  108*   CO2 mmol/L 28.5 28.5 25.4 23.9   BUN mg/dL 34* 35* 33* 31*   CREATININE mg/dL 1.92* 1.88* 1.77* 1.64*   CALCIUM mg/dL 8.9 8.6 9.3 9.0   BILIRUBIN mg/dL  --   --   --  0.3   ALK PHOS U/L  --   --   --  72   ALT (SGPT) U/L  --   --   --  17   AST (SGOT) U/L  --   --   --  15   GLUCOSE mg/dL 100* 73 44* 166*     Anticoagulation history: Per [facility] anticoagulation clinic visit note on 6/20/19 - home dose of warfarin is 8 mg every Mon, Thu; 6 mg all other days (weekly dose = 46 mg)    Hospital Anticoagulation:  Consulting provider: Dr. Awad  Start date: 7/2/19  Indication: CVA  Target INR: 2-3  Expected duration: indefinite  Bridge Therapy: none    Date 6/20 7/2 7/3 7/4 7/5 7/6        INR 2.4 1.63 1.72 1.99 2.26 2.33        Warfarin dose  6.5mg 7.5mg 7.5 mg 6mg 6mg          Potential drug interactions:   Aspirin and Levothyroxine - can increase INR (both are home meds)    Relevant nutrition status: Diet Regular; Cardiac, Consistent Carbohydrate, Renal     Education complete?/ Date: not yet    Assessment/Plan:  INR 2.33 today at goal 2-3 - will  resume home dose of 8 mg every Mon, Thu; 6 mg all other days   Monitor INR daily   Follow up tomorrow am.    Pharmacy will continue to follow until discharge or discontinuation of warfarin.   Rhoda Lima, PharmD, BCACP

## 2019-07-06 NOTE — PROGRESS NOTES
"DAILY PROGRESS NOTE  [facility]    Patient Identification:  Name: Carlito Mo  Age: 64 y.o.  Sex: male  :  1955  MRN: [id number]         Primary Care Physician: Jyoti Reynolds MD      Subjective  No complaints.    Objective:  General Appearance:  Comfortable, well-appearing, in no acute distress and not in pain.    Vital signs: (most recent): Blood pressure 123/71, pulse 62, temperature 97.9 °F (36.6 °C), temperature source Oral, resp. rate 16, height 182.9 cm (72\"), weight 79.7 kg (175 lb 11.2 oz), SpO2 95 %.    Lungs:  Normal effort and normal respiratory rate.  Breath sounds clear to auscultation.    Heart: Normal rate.  Regular rhythm.    Extremities: There is no dependent edema.    Neurological: Patient is alert and oriented to person, place and time.    Skin:  Warm and dry.                Vital signs in last 24 hours:  Temp:  [97.4 °F (36.3 °C)-97.9 °F (36.6 °C)] 97.9 °F (36.6 °C)  Heart Rate:  [62-67] 62  Resp:  [16] 16  BP: (123-143)/(71-82) 123/71    Intake/Output:    Intake/Output Summary (Last 24 hours) at 2019 1016  Last data filed at 2019 0904  Gross per 24 hour   Intake 720 ml   Output 2100 ml   Net -1380 ml         Results from last 7 days   Lab Units 19  0631 19  0320 19  1814   WBC 10*3/mm3 7.88 6.10 6.63   HEMOGLOBIN g/dL 10.5* 9.2* 10.1*   PLATELETS 10*3/mm3 293 220 278     Results from last 7 days   Lab Units 19  0308 19  0432 19  0631 19  0320 19  1814   SODIUM mmol/L 139 142 141 143 146*   POTASSIUM mmol/L 4.0 4.0 3.6 3.8 4.3   CHLORIDE mmol/L 99 102 103 108* 111*   CO2 mmol/L 28.5 28.5 25.4 23.9 22.8   BUN mg/dL 34* 35* 33* 31* 31*   CREATININE mg/dL 1.92* 1.88* 1.77* 1.64* 1.59*   GLUCOSE mg/dL 100* 73 44* 166* 119*   Estimated Creatinine Clearance: 43.8 mL/min (A) (by C-G formula based on SCr of 1.92 mg/dL (H)).  Results from last 7 days   Lab Units 19  0308 19  0432 19  0631 " 07/03/19  0320 07/02/19  1814   CALCIUM mg/dL 8.9 8.6 9.3 9.0 9.5   ALBUMIN g/dL  --   --   --  3.40*  --      Results from last 7 days   Lab Units 07/03/19  0320   ALBUMIN g/dL 3.40*   BILIRUBIN mg/dL 0.3   ALK PHOS U/L 72   AST (SGOT) U/L 15   ALT (SGPT) U/L 17       Assessment:  Acute on chronic congestive heart failure:  Now on p.o. diuretics. Cardiology input appreciated.     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    CKD (chronic kidney disease) stage 3, GFR 30-59 ml/min: Baseline creatinine runs about 1.7-2.0.  Presently 1.92.  Will need to be followed fairly closely on discharge.  Consider holding Cozaar if it continues to increase.    Acquired hypothyroidism:     Acute respiratory failure with hypoxia:  Wean O2.     YAW on CPAP    Elevated troponin    Anemia - VIJAYA/ACD:        Plan:  Please see above.  Discharge when okay with cardiology.    Flo Sherman MD  7/6/2019  10:16 AM

## 2019-07-06 NOTE — PLAN OF CARE
Problem: Patient Care Overview  Goal: Plan of Care Review  Outcome: Ongoing (interventions implemented as appropriate)   07/06/19 0516   Coping/Psychosocial   Plan of Care Reviewed With patient   Plan of Care Review   Progress improving   OTHER   Outcome Summary Pt a&ox4. Handling po lasix well. Hopefully dc home today. No c/o pain or discomfort. VS stable. Currently resting comfortably in bed. Will continue to monitor.      Goal: Individualization and Mutuality  Outcome: Ongoing (interventions implemented as appropriate)    Goal: Discharge Needs Assessment  Outcome: Ongoing (interventions implemented as appropriate)    Goal: Interprofessional Rounds/Family Conf  Outcome: Ongoing (interventions implemented as appropriate)      Problem: Fall Risk (Adult)  Goal: Absence of Fall  Outcome: Ongoing (interventions implemented as appropriate)      Problem: Cardiac Output Decreased (Adult)  Goal: Effective Tissue Perfusion  Outcome: Ongoing (interventions implemented as appropriate)      Problem: Fluid Volume Excess (Adult)  Goal: Optimal Fluid Balance  Outcome: Ongoing (interventions implemented as appropriate)

## 2019-07-06 NOTE — TELEPHONE ENCOUNTER
Please call patient to have him schedule appointment to see me within the next week and also have him keep his 8/30/19 follow-up with Dr. Murguia as scheduled.     Okay to add him onto my schedule at 9:30am on Thursday or Friday (7/11 or 7/12) if these spots are not already taken. If these are already full then please come to me for another place to schedule him.      Thanks,  JI Reed

## 2019-07-06 NOTE — PROGRESS NOTES
LOS: 4 days   Patient Care Team:  Jyoti Reynolds MD as PCP - General (Internal Medicine)  Amber Murguia MD as Consulting Physician (Cardiology)  Sera La RPH as Pharmacist      Chief Complaint:  Acute on chronic systolic congestive heart failure      Interval History:    Patient on PO diuretics.  Baseline creatinine usually 1.7-2.0, now 1.92 this AM.  Patient reports he has upcoming nephrology appointment scheduled.  Appears euvolemic.  Denies chest discomfort or SOA.  Reports he feels ready to go home.  On room air.          Objective   Vital Signs  Temp:  [97.4 °F (36.3 °C)-97.9 °F (36.6 °C)] 97.9 °F (36.6 °C)  Heart Rate:  [62-67] 62  Resp:  [16] 16  BP: (123-143)/(71-82) 123/71    Intake/Output Summary (Last 24 hours) at 7/6/2019 1203  Last data filed at 7/6/2019 0904  Gross per 24 hour   Intake 720 ml   Output 1800 ml   Net -1080 ml           Physical Exam   Constitutional: He is oriented to person, place, and time. He appears well-developed and well-nourished. No distress.   Neck: Neck supple. No JVD present.   Cardiovascular: Normal rate, regular rhythm, normal heart sounds and intact distal pulses. Exam reveals no gallop and no friction rub.   No murmur heard.  Pulmonary/Chest: Effort normal and breath sounds normal. No respiratory distress. He has no wheezes. He has no rales.   Abdominal: Soft. Bowel sounds are normal. He exhibits no distension. There is no tenderness. There is no guarding.   Musculoskeletal: Normal range of motion. He exhibits no edema or tenderness.   Neurological: He is alert and oriented to person, place, and time.   Skin: Skin is warm and dry. He is not diaphoretic. No erythema.   Psychiatric: He has a normal mood and affect.           Results Review:      Results from last 7 days   Lab Units 07/06/19  0308 07/05/19  0432 07/04/19  0631   SODIUM mmol/L 139 142 141   POTASSIUM mmol/L 4.0 4.0 3.6   CHLORIDE mmol/L 99 102 103   CO2 mmol/L 28.5 28.5 25.4   BUN mg/dL 34* 35*  33*   CREATININE mg/dL 1.92* 1.88* 1.77*   GLUCOSE mg/dL 100* 73 44*   CALCIUM mg/dL 8.9 8.6 9.3     Results from last 7 days   Lab Units 07/02/19  1814   TROPONIN T ng/mL 0.077*     Results from last 7 days   Lab Units 07/04/19  0631 07/03/19  0320 07/02/19  1814   WBC 10*3/mm3 7.88 6.10 6.63   HEMOGLOBIN g/dL 10.5* 9.2* 10.1*   HEMATOCRIT % 34.0* 30.2* 33.4*   PLATELETS 10*3/mm3 293 220 278     Results from last 7 days   Lab Units 07/06/19  0308 07/05/19  0432 07/04/19  0631   INR  2.33* 2.26* 1.99*                   I reviewed the patient's new clinical results.  I reviewed patient's EKG/Telemetry data.            Medication Review:     amLODIPine 5 mg Oral Daily   aspirin 81 mg Oral QAM   atorvastatin 40 mg Oral Nightly   buPROPion  mg Oral Daily   cholecalciferol 1,000 Units Oral Daily   ferrous sulfate 325 mg Oral Daily With Breakfast   furosemide 40 mg Oral Daily   insulin glargine 8 Units Subcutaneous Nightly   insulin lispro 0-7 Units Subcutaneous 4x Daily With Meals & Nightly   insulin lispro 2 Units Subcutaneous TID AC   levothyroxine 75 mcg Oral Q AM   losartan 50 mg Oral Daily   metoprolol succinate XL 25 mg Oral Q24H   sodium chloride 3 mL Intravenous Q12H   tamsulosin 0.4 mg Oral Nightly   [START ON 7/8/2019] warfarin 8 mg Oral Once per day on Mon Thu   And      warfarin 6 mg Oral Once per day on Sun Tue Wed Fri Sat         Pharmacy to dose warfarin              Assessment/Plan     1.  Acute on chronic systolic congestive heart failure: IV diuretics were stopped yesterday and patient was transitioned to oral diuretics.  Continues to appear euvolemic.  Creatinine did go up today, though not higher than it has been fluctuating within the last few of months. Patient reports he has appointment with nephrology coming up within the next few weeks, which he was asked to keep.  Will need BMP within 1 week of discharge.  Will need to follow-up with cardiology within 1 week of discharge.      2.  Coronary  artery disease with history of apical infarct and chronically elevated troponin    3.  Chronic kidney disease: as above.  BMP within 1 week of discharge.  Patient to verify upcoming follow-up with nephrology within the next 3 weeks.    4.  History of stroke: On warfarin.    5.  Hypertension:  BP stable.      6.  Diabetes    7.  Hypothyroidism      -Ok to discharge from cardiac standpoint.  -When patient is discharged he will need close follow-up with cardiology APRN within 1 week of discharge as well as keeping 8/30/2019 follow-up with Dr. Murguia.  -He will need BMP within 1 week of hospital discharge as well.   -Keep upcoming appointment with nephrology.           Thanks,    Nadine Blake, JULIO C, APRN  07/06/19  12:03 PM

## 2019-07-07 ENCOUNTER — READMISSION MANAGEMENT (OUTPATIENT)
Dept: CALL CENTER | Facility: HOSPITAL | Age: 64
End: 2019-07-07

## 2019-07-07 NOTE — OUTREACH NOTE
Prep Survey      Responses   Facility patient discharged from?  [facility]   Is patient eligible?  Yes   Discharge diagnosis  Acute on chronic congestive heart failure   Does the patient have one of the following disease processes/diagnoses(primary or secondary)?  CHF   Does the patient have Home health ordered?  No   Is there a DME ordered?  Yes   What DME was ordered?  Oxygen and CPAP   Prep survey completed?  Yes          Liliana Mcmillan RN

## 2019-07-09 ENCOUNTER — READMISSION MANAGEMENT (OUTPATIENT)
Dept: CALL CENTER | Facility: HOSPITAL | Age: 64
End: 2019-07-09

## 2019-07-09 NOTE — OUTREACH NOTE
CHF Week 1 Survey      Responses   Facility patient discharged from?  [facility]   Does the patient have one of the following disease processes/diagnoses(primary or secondary)?  CHF   Is there a successful TCM telephone encounter documented?  No   CHF Week 1 attempt successful?  Yes   Call start time  1006   Call end time  1017   Medication alerts for this patient  has started  his new change in the Lasix,  still taking 20mg    Meds reviewed with patient/caregiver?  Yes   Is the patient having any side effects they believe may be caused by any medication additions or changes?  No   Does the patient have all medications ordered at discharge?  No   Comments regarding appointments  advise to call his provider for an appointment this coming week   Does the patient have a primary care provider?   Yes   Does the patient have an appointment with their PCP within 7 days of discharge?  No   What is preventing the patient from scheduling follow up appointments within 7 days of discharge?  Haven't had time   Nursing Interventions  Advised patient to make appointment   Has the patient kept scheduled appointments due by today?  N/A [has not made the appointment, but other appointments are schedule for 8/30]   Has home health visited the patient within 72 hours of discharge?  N/A   What DME was ordered?  Oxygen and CPAP   Has all DME been delivered?  Yes   Psychosocial issues?  No   Did the patient receive a copy of their discharge instructions?  Yes   Nursing interventions  Reviewed instructions with patient   What is the patient's perception of their health status since discharge?  Improving   Is the patient weighing daily?  No [stress the importance of weighing daily]   Does the patient have scales?  Yes   Daily weight interventions  Education provided on importance of daily weight   Is the patient able to teach back Heart Failure diet management?  Yes   Is the patient able to teach back Heart Failure Zones?  No [review the  zones with the patient and he feels he is in the yellow zone]   Is the patient able to teach back signs and symptoms of worsening condition? (i.e. weight gain, shortness of air, etc.)  No    CHF Week 1 call completed?  Yes   Wrap up additional comments  encourage the patient to take the correct dose of the Lasix          Kenya Lyn RN

## 2019-07-09 NOTE — PROGRESS NOTES
Continued Stay Note  [facility]     Patient Name: Carlito Mo  MRN: [id number]  Today's Date: 7/9/2019    Admit Date: 7/2/2019    Discharge Plan     Row Name 07/09/19 1645       Plan    Final Discharge Disposition Code  01 - home or self-care    Final Note  home        Discharge Codes    No documentation.       Expected Discharge Date and Time     Expected Discharge Date Expected Discharge Time    Jul 6, 2019             Venessa Pacheco RN

## 2019-07-16 ENCOUNTER — TELEPHONE (OUTPATIENT)
Dept: CARDIOLOGY | Facility: CLINIC | Age: 64
End: 2019-07-16

## 2019-07-16 ENCOUNTER — OFFICE VISIT (OUTPATIENT)
Dept: CARDIOLOGY | Facility: CLINIC | Age: 64
End: 2019-07-16

## 2019-07-16 VITALS
WEIGHT: 190 LBS | BODY MASS INDEX: 25.73 KG/M2 | OXYGEN SATURATION: 98 % | SYSTOLIC BLOOD PRESSURE: 150 MMHG | HEART RATE: 57 BPM | DIASTOLIC BLOOD PRESSURE: 80 MMHG | HEIGHT: 72 IN

## 2019-07-16 DIAGNOSIS — I10 ESSENTIAL HYPERTENSION: ICD-10-CM

## 2019-07-16 DIAGNOSIS — E78.49 OTHER HYPERLIPIDEMIA: ICD-10-CM

## 2019-07-16 DIAGNOSIS — I25.10 CORONARY ARTERY DISEASE INVOLVING NATIVE CORONARY ARTERY OF NATIVE HEART WITHOUT ANGINA PECTORIS: ICD-10-CM

## 2019-07-16 DIAGNOSIS — I50.42 CHRONIC COMBINED SYSTOLIC AND DIASTOLIC CONGESTIVE HEART FAILURE (HCC): Primary | ICD-10-CM

## 2019-07-16 DIAGNOSIS — G47.33 OSA ON CPAP: ICD-10-CM

## 2019-07-16 DIAGNOSIS — Z99.89 OSA ON CPAP: ICD-10-CM

## 2019-07-16 PROCEDURE — 99214 OFFICE O/P EST MOD 30 MIN: CPT | Performed by: NURSE PRACTITIONER

## 2019-07-16 PROCEDURE — 93000 ELECTROCARDIOGRAM COMPLETE: CPT | Performed by: NURSE PRACTITIONER

## 2019-07-16 NOTE — PROGRESS NOTES
Date of Office Visit: 2019  Encounter Provider: Nadine Blake, JULIO C, APRN  Place of Service: [facility] CARDIOLOGY  Patient Name: Carlito Mo  :1955        Subjective:     Chief Complaint:  Follow-up, congestive heart failure, coronary artery disease, cardiomyopathy.      History of Present Illness:  Carlito Mo is a 64 y.o. male patient of Dr. Murguia.  I am seeing this patient in the office today and I reviewed his records.    Patient has a history of   Patient has a history of coronary artery disease, cardiomyopathy, congestive heart failure, respiratory failure, chronic kidney disease, hypothyroidism, type 2 diabetes, history of stroke while on dual antiplatelet therapy now on warfarin.    Patient was first seen by Dr. Murguia 2017 when he was admitted with altered mental status.  He was diagnosed with multiple small acute infarcts.  Echocardiogram 2017 showed mild left ventricular hypertrophy, mild left atrial enlargement, negative bubble study, normal LV systolic function with an ejection fraction of 60%.  Transesophageal echo 2017 showed that his LV function was mildly low with an ejection fraction of 45% with focal wall motion abnormalities primarily in the septum and anterior wall and apex.  Small patent foramen ovale was noted and no significant valvular heart disease or cardiac source of emboli minus a small PFO were seen.  Holter monitor showed a couple of short bursts of SVT but no sustained arrhythmia.  He was seen back in the hospital 2017 in May 2018 with altered mental status, as well as heart failure exacerbations.  Last echocardiogram 2018 showed mildly decreased left ventricular systolic function with EF of 43%.  Severe hypokinesis of the distal anterior septum and anterior apex noted.  Mild mitral valve regurgitation was present, left atrial cavity size was dilated, right ventricular cavity was mildly dilated, with  trivial pericardial effusion.     Patient presented to the ER 4/23/2019 complaining of intermittent chest discomfort overnight.  Troponin was elevated 0.045 and 0.044.  Troponins have been elevated in this range prior admissions as well.  He had nitroglycerin paste which helped with the pain.  Prior to this chest pain episode he had not had any other episodes of chest pain.  Nuclear stress test was ordered which showed medium sized infarct in the apex without significant ischemia.  Beta-blockade was restarted.  Cardiac catheterization to be considered if chest pain recurs or becomes persistent.      Patient presented to the hospital 7/2/2019 reporting increased leg swelling and shortness of breath over the previous week.  Chest x-ray showed increased and previously noted pleural effusions.  BNP was elevated above baseline.  Troponin was negative and EKG did not show acute changes.  Patient was admitted for IV diuresis and was then transitioned to oral diuretics.  He continued to improve with creatinine elevated though not beyond previous baseline range.  He was discharged 7/6/2019.      Patient presents to office today for follow-up appointment.  Patient reports he has been doing okay overall since hospital discharge.  He is trying to avoid salt.  Unfortunately his scale is broken so he has not been checking daily weights.  He is going to try to purchase a new one.  His blood pressure is slightly elevated in the office today, however he reports it has been lower at home, closer to 130 systolic.  He will continue to monitor and call if it staying greater than 130/80.  He continues to have some chronic shortness of breath with exertion which is about the same as what it usually is.  He does feel that his lower extremity swelling has improved since discharge.  He uses compression socks occasionally and does feel that the swelling is better with compression socks.  He gets some lightheadedness with rapid position  changes and we discussed staying hydrated and changing positions more slowly.  He continues to have some chronic fatigue and reports he has been using his CPAP every night but does not feel like he sleeps well with it.  He has an appointment with sleep medicine coming up which she will keep to discuss with them in more detail.  He denies any chest pain, palpitations, racing heartbeat sensation, syncope, near syncope, falls, or abnormal bleeding.  He has an appointment with nephrology this afternoon where he expects to have lab work done.          Past Medical History:   Diagnosis Date   • Acquired hypothyroidism    • Acute congestive heart failure (CMS/HCC)    • Acute respiratory failure with hypoxia (CMS/HCC)    • Acute sinusitis    • SYLVAIN (acute kidney injury) (CMS/[facility])     on CKD   • Anemia    • Arthritis    • CAD (coronary artery disease)    • Cancer (CMS/[facility])     skin   • Chronic combined systolic and diastolic congestive heart failure (CMS/HCC)    • Chronic ischemic heart disease    • CKD (chronic kidney disease)    • COPD (chronic obstructive pulmonary disease) (CMS/[facility])    • Depression    • Diabetes mellitus type 2, uncontrolled, without complications (CMS/[facility])    • Disease of thyroid gland    • Fatigue    • Frequent PVCs    • Heart attack (CMS/HCC)    • History of coronary artery disease     with remote history of bypass surgery in 2001   • History of transfusion    • Hyperglycemia    • Hyperlipidemia    • Hypertension    • Hypertensive encephalopathy    • Mental status change     Acute   • YAW on CPAP    • Pleural effusion    • Pneumonia    • RBBB (right bundle branch block)    • Screening for prostate cancer 2011   • Stroke (CMS/[facility])    • TIA (transient ischemic attack)    • Type 2 diabetes mellitus (CMS/[facility])    • Urinary retention    • Vitamin D deficiency      Past Surgical History:   Procedure Laterality Date   • APPENDECTOMY N/A 02/14/2016    Dr. Alexey Dodson   • COLONOSCOPY      over 20 years ago.   no polyps    • COLONOSCOPY N/A 9/18/2018    Procedure: COLONOSCOPY;  Surgeon: Jose Enrique Louie MD;  Location: [facility] ENDOSCOPY;  Service: Gastroenterology   • COLONOSCOPY N/A 9/19/2018    IH, EH, polyps (TAs w/low grade dysplasia)   • CORONARY ARTERY BYPASS GRAFT  11/2001   • TONSILLECTOMY     • VASECTOMY       Outpatient Medications Prior to Visit   Medication Sig Dispense Refill   • amLODIPine (NORVASC) 5 MG tablet Take 5 mg by mouth Daily.     • aspirin 81 MG EC tablet Take 81 mg by mouth Every Morning.     • atorvastatin (LIPITOR) 40 MG tablet Take 40 mg by mouth Every Night.     • buPROPion XL (WELLBUTRIN XL) 150 MG 24 hr tablet Take 150 mg by mouth Daily.     • Cholecalciferol (VITAMIN D3) 5000 units capsule capsule Take 5,000 Units by mouth Daily.     • ferrous sulfate 325 (65 FE) MG tablet Take 325 mg by mouth Daily With Breakfast.     • furosemide (LASIX) 40 MG tablet Take 1 tablet by mouth Daily.     • Insulin Glargine (BASAGLAR KWIKPEN) 100 UNIT/ML injection pen Inject 8 Units under the skin into the appropriate area as directed Every Night. Patient says he does not take consistently     • insulin glulisine (APIDRA) 100 UNIT/ML injection Inject 2 Units under the skin into the appropriate area as directed 3 (Three) Times a Day Before Meals. Patient says he does not take consistently     • levothyroxine (SYNTHROID, LEVOTHROID) 75 MCG tablet Take 75 mcg by mouth Daily.     • losartan (COZAAR) 50 MG tablet Take 1 tablet by mouth Daily. 30 tablet 11   • metoprolol succinate XL (TOPROL-XL) 25 MG 24 hr tablet Take 1 tablet by mouth Daily. 30 tablet 11   • tamsulosin (FLOMAX) 0.4 MG capsule 24 hr capsule Take 1 capsule by mouth Every Night.     • warfarin (COUMADIN) 2 MG tablet Take 6 mg by mouth Daily. Patient takes 8 mg every Mon, Thu; 6 mg all other days     • ipratropium-albuterol (COMBIVENT RESPIMAT)  MCG/ACT inhaler Inhale 1 puff 4 (Four) Times a Day As Needed for Wheezing.       No  facility-administered medications prior to visit.        Allergies as of 07/16/2019 - Reviewed 07/16/2019   Allergen Reaction Noted   • Fluoxetine Other (See Comments) 03/12/2018   • Prozac [fluoxetine hcl] Other (See Comments) 12/17/2015     Social History     Socioeconomic History   • Marital status:      Spouse name: Not on file   • Number of children: Not on file   • Years of education: Not on file   • Highest education level: Not on file   Tobacco Use   • Smoking status: Never Smoker   • Smokeless tobacco: Never Used   • Tobacco comment: daily caffiene   Substance and Sexual Activity   • Alcohol use: No   • Drug use: No   • Sexual activity: Defer     Family History   Problem Relation Age of Onset   • Diabetes Mother    • Hypertension Mother    • Macular degeneration Mother    • Alcohol abuse Father    • Cancer Father         lung   • Alcohol abuse Brother        Review of Systems   Constitution: Positive for malaise/fatigue. Negative for chills, fever, weight gain and weight loss.   HENT: Negative for ear pain, hearing loss, nosebleeds and sore throat.    Eyes: Negative for blurred vision, double vision, redness, vision loss in left eye and vision loss in right eye.   Cardiovascular: Positive for leg swelling.        SEE HPI.    Respiratory: Positive for shortness of breath. Negative for cough, snoring and wheezing.    Endocrine: Positive for cold intolerance. Negative for heat intolerance.   Skin: Negative for itching, rash and suspicious lesions.   Musculoskeletal: Negative for joint pain, joint swelling and myalgias.   Gastrointestinal: Negative for abdominal pain, diarrhea, hematemesis, melena, nausea and vomiting.   Genitourinary: Positive for decreased libido and frequency. Negative for dysuria and hematuria.        ED   Neurological: Negative for dizziness, headaches, numbness, paresthesias and seizures.   Psychiatric/Behavioral: Positive for depression. Negative for altered mental status. The  "patient is not nervous/anxious.           Objective:     Vitals:    07/16/19 1040   BP: 150/80   BP Location: Left arm   Pulse: 57   SpO2: 98%   Weight: 86.2 kg (190 lb)   Height: 182.9 cm (72\")     Body mass index is 25.77 kg/m².      PHYSICAL EXAM:  Physical Exam   Constitutional: He is oriented to person, place, and time. He appears well-developed and well-nourished. No distress.   HENT:   Head: Normocephalic and atraumatic.   Eyes: Pupils are equal, round, and reactive to light.   Neck: Neck supple. No JVD present. Carotid bruit is not present. No tracheal deviation present.   Cardiovascular: Normal rate, regular rhythm, normal heart sounds and intact distal pulses. Exam reveals no gallop and no friction rub.   No murmur heard.  Pulses:       Radial pulses are 2+ on the right side, and 2+ on the left side.        Posterior tibial pulses are 2+ on the right side, and 2+ on the left side.   Pulmonary/Chest: Effort normal and breath sounds normal. No respiratory distress. He has no wheezes. He has no rales.   Abdominal: Soft. Bowel sounds are normal. He exhibits no distension. There is no tenderness.   Musculoskeletal: He exhibits edema (trace nonpitting ). He exhibits no tenderness or deformity.   Neurological: He is alert and oriented to person, place, and time.   Skin: Skin is warm and dry. No rash noted. He is not diaphoretic. No erythema.   Psychiatric: He has a normal mood and affect. His behavior is normal. Judgment normal.           ECG 12 Lead  Date/Time: 7/16/2019 10:55 AM  Performed by: Nadine Blake DNP, APRN  Authorized by: Nadine Blake DNP, APRN   Comparison: compared with previous ECG from 7/2/2019  Rhythm: sinus rhythm  Rate: bradycardic  BPM: 58  Conduction: right bundle branch block  Other findings comments: Anterior infarct, age indeterminant; also seen on previous ECG.    Clinical impression: abnormal EKG  Comments: No significant changes from previous ECG.              Assessment:    "    Diagnosis Plan   1. Chronic combined systolic and diastolic congestive heart failure (CMS/HCC)     2. Coronary artery disease involving native coronary artery of native heart without angina pectoris     3. Essential hypertension     4. Other hyperlipidemia     5. YAW on CPAP           Plan:     1. Chronic combined systolic and diastolic congestive heart failure: Appears euvolemic in the office today.  Lungs clear and only trace nonpitting edema.  Patient sees nephrology this afternoon where he will have repeat kidney function test done.  He will continue to avoid salt.  Unfortunately his scale is broken but he is going to try to purchase a new one.  He will call right away for any increased shortness of breath, lower extremity swelling, or weight gain.    Heart Failure  Plan  • The patient has received heart failure education on the following topics: dietary sodium restriction, medication instructions, symptom management and weight monitoring    Subjective/Objective  • The patient reports dyspnea    • Physical exam findings negative for rales and elevated JVP.      2. Coronary artery disease: Recent stress test showed prior infarct but no significant ischemia.  Patient remains on beta-blocker therapy.  Denies anginal symptoms at this time.  May need to consider heart catheterization if he has recurrence of chest discomfort symptoms.    Coronary Artery Disease  Assessment  • The patient has no angina    Subjective - Objective  • Current antiplatelet therapy includes aspirin 81 mg      3. Palpitations with history of PVCs: Remains on metoprolol.  Denies palpitations.  4. Hypertension: Blood pressure somewhat elevated in the office today, however he reports he has been checking it at home and it has been closer to 130 systolic.  He will continue to monitor and call if it is staying greater than 130/80.  5. History of recurrent CVA: Now on warfarin.  Followed by neurology with upcoming appointment  scheduled.  6. Hyperlipidemia: Managed by outside provider.  7. Diabetes: Managed by outside provider.  8. Chronic kidney disease: Managed by nephrology.  Patient to keep upcoming nephrology appointment.  Will recheck BMP.  9. Obstructive sleep apnea: On CPAP therapy.      Patient to keep 8/30/2019 follow-up with Dr. Murguia as scheduled or follow-up sooner if needed for any new, recurrent, or worsening symptoms or other problems/concerns.    Greater than 13 of 25-minute office visit was spent face-to-face with patient discussing signs/symptoms of when to call the office for sooner appointment, including calling for increased shortness of breath, increased lower extremity swelling, weight gain, chest discomfort or other new or worsening symptoms, importance of low-salt diet, blood pressure goals and monitoring, and needed follow-up with our office and outside offices.           Your medication list           Accurate as of 7/16/19 10:58 AM. If you have any questions, ask your nurse or doctor.               CONTINUE taking these medications      Instructions Last Dose Given Next Dose Due   amLODIPine 5 MG tablet  Commonly known as:  NORVASC      Take 5 mg by mouth Daily.       APIDRA 100 UNIT/ML injection  Generic drug:  insulin glulisine      Inject 2 Units under the skin into the appropriate area as directed 3 (Three) Times a Day Before Meals. Patient says he does not take consistently       aspirin 81 MG EC tablet      Take 81 mg by mouth Every Morning.       atorvastatin 40 MG tablet  Commonly known as:  LIPITOR      Take 40 mg by mouth Every Night.       buPROPion  MG 24 hr tablet  Commonly known as:  WELLBUTRIN XL      Take 150 mg by mouth Daily.       ferrous sulfate 325 (65 FE) MG tablet      Take 325 mg by mouth Daily With Breakfast.       furosemide 40 MG tablet  Commonly known as:  LASIX      Take 1 tablet by mouth Daily.       Insulin Glargine 100 UNIT/ML injection pen  Commonly known as:  BASAGLAR  KWIKPEN      Inject 8 Units under the skin into the appropriate area as directed Every Night. Patient says he does not take consistently       ipratropium-albuterol  MCG/ACT inhaler  Commonly known as:  COMBIVENT RESPIMAT      Inhale 1 puff 4 (Four) Times a Day As Needed for Wheezing.       levothyroxine 75 MCG tablet  Commonly known as:  SYNTHROID, LEVOTHROID      Take 75 mcg by mouth Daily.       losartan 50 MG tablet  Commonly known as:  COZAAR      Take 1 tablet by mouth Daily.       metoprolol succinate XL 25 MG 24 hr tablet  Commonly known as:  TOPROL-XL      Take 1 tablet by mouth Daily.       tamsulosin 0.4 MG capsule 24 hr capsule  Commonly known as:  FLOMAX      Take 1 capsule by mouth Every Night.       vitamin D3 5000 units capsule capsule      Take 5,000 Units by mouth Daily.       warfarin 2 MG tablet  Commonly known as:  COUMADIN      Take 6 mg by mouth Daily. Patient takes 8 mg every Mon, Thu; 6 mg all other days              I did not stop or change the above medications..  Patient's medication list was updated to reflect medications they are currently taking including medication changes made by other providers.        Thanks,    Nadine Blake, JULIO C, APRN  07/16/2019         Dictated utilizing Dragon dictation

## 2019-07-17 ENCOUNTER — ANTICOAGULATION VISIT (OUTPATIENT)
Dept: PHARMACY | Facility: HOSPITAL | Age: 64
End: 2019-07-17

## 2019-07-17 ENCOUNTER — READMISSION MANAGEMENT (OUTPATIENT)
Dept: CALL CENTER | Facility: HOSPITAL | Age: 64
End: 2019-07-17

## 2019-07-17 DIAGNOSIS — I63.10 CEREBROVASCULAR ACCIDENT (CVA) DUE TO EMBOLISM OF PRECEREBRAL ARTERY (HCC): ICD-10-CM

## 2019-07-17 LAB
INR PPP: 1.8 (ref 0.91–1.09)
PROTHROMBIN TIME: 21 SECONDS (ref 10–13.8)

## 2019-07-17 PROCEDURE — 36416 COLLJ CAPILLARY BLOOD SPEC: CPT

## 2019-07-17 PROCEDURE — G0463 HOSPITAL OUTPT CLINIC VISIT: HCPCS

## 2019-07-17 PROCEDURE — 85610 PROTHROMBIN TIME: CPT

## 2019-07-17 NOTE — PROGRESS NOTES
Anticoagulation Clinic Progress Note    Anticoagulation Summary  As of 2019    INR goal:   2.0-3.0   TTR:   40.7 % (5.2 mo)   INR used for dosin.8! (2019)   Warfarin maintenance plan:   8 mg every Mon, Thu; 6 mg all other days   Weekly warfarin total:   46 mg   Plan last modified:   Gregory Mei RPH (6/3/2019)   Next INR check:   2019   Priority:   High   Target end date:       Indications    Cerebrovascular accident (CVA) due to embolism of precerebral artery (CMS/HCC) [I63.10]             Anticoagulation Episode Summary     INR check location:       Preferred lab:       Send INR reminders to:    [facility] CLINICAL POOL    Comments:         Anticoagulation Care Providers     Provider Role Specialty Phone number    Amber Murguia MD Referring Cardiology [phone]          Clinic Interview:  Patient Findings     Positives:   Change in medications, Hospital admission    Negatives:   Signs/symptoms of thrombosis, Signs/symptoms of bleeding,   Laboratory test error suspected, Change in health, Change in alcohol use,   Change in activity, Upcoming invasive procedure, Emergency department   visit, Upcoming dental procedure, Missed doses, Extra doses, Change in   diet/appetite, Bruising, Other complaints    Comments:   CHF hospitalization 2 wks ago. Increased furosemide.      Clinical Outcomes     Negatives:   Major bleeding event, Thromboembolic event,   Anticoagulation-related hospital admission, Anticoagulation-related ED   visit, Anticoagulation-related fatality    Comments:   CHF hospitalization 2 wks ago. Increased furosemide.        INR History:  Anticoagulation Monitoring 6/3/2019 2019 2019   INR 1.8 2.4 1.8   INR Date 6/3/2019 2019 2019   INR Goal 2.0-3.0 2.0-3.0 2.0-3.0   Trend Up Same Same   Last Week Total 45 mg 34 mg 46 mg   Next Week Total 46 mg 46 mg 48 mg   Sun 6 mg 6 mg 6 mg   Mon 8 mg 8 mg 8 mg   Tue 6 mg 6 mg 6 mg   Wed 6 mg 6 mg 8 mg (); Otherwise  6 mg   Thu 8 mg 8 mg 8 mg   Fri 6 mg 6 mg 6 mg   Sat 6 mg 6 mg 6 mg   Visit Report - - -   Some recent data might be hidden       Plan:  1. INR is Subtherapeutic today- see above in Anticoagulation Summary.  Will instruct Carlito MEHTA Chepe to Change their warfarin regimen- see above in Anticoagulation Summary.  2. Follow up in 2 weeks  3. Patient declines warfarin refills.  4. Verbal and written information provided. Patient expresses understanding and has no further questions at this time.    Gregory Mei [facility]

## 2019-07-17 NOTE — OUTREACH NOTE
CHF Week 2 Survey      Responses   Facility patient discharged from?  [place]   Does the patient have one of the following disease processes/diagnoses(primary or secondary)?  CHF   Week 2 attempt successful?  Yes   Call start time  1012   Call end time  1025   Discharge diagnosis  Acute on chronic congestive heart failure   Meds reviewed with patient/caregiver?  Yes   Is the patient taking all medications as directed (includes completed medication regime)?  No [Taking Lasix 20 mg tablet daily. The dose change of 40 mg daily at discharge, the pharmacy did not receive script for change.]   Nursing Interventions  Nurse provided patient education   Medication comments  Message sent to casemanagement   Does the patient have a primary care provider?   Yes   Has the patient kept scheduled appointments due by today?  Yes   Comments  Has seen PCP.   Has home health visited the patient within 72 hours of discharge?  N/A   What DME was ordered?  Has O2 and CPAP wears O2 off and on throughout the day Cpap at HS   Psychosocial issues?  No   Did the patient receive a copy of their discharge instructions?  Yes   What is the patient's perception of their health status since discharge?  Improving   Nursing interventions  Nurse provided patient education   Is the patient weighing daily?  No   Does the patient have scales?  -- [Patient says he needs a new scale.]   Daily weight interventions  Education provided on importance of daily weight   Is the patient able to teach back Heart Failure Zones?  Yes   Is the patient able to teach back signs and symptoms of worsening condition? (i.e. weight gain, shortness of air, etc.)  Yes   CHF Week 2 call completed?  Yes   Wrap up additional comments  Note sent to case management regarding Lasix and scales.          Татьяна Sahni RN

## 2019-07-17 NOTE — OUTREACH NOTE
Case Management Call Center Follow-up      Responses   [facility] Call Center Tracking Reason?  No Script   Has the Call Center Case Management Follow-up issue been resolved?  Yes   Follow-up Comments  HR CCP reviewed all notes and spoke with Mr. Mo and the pharmacist at [facility].  Pt was discharged on 7/6/2019 on Lasix 40mg Daily.  No prescription was sent at that time to [facility] due to the fact that Mr. Mo had a supply of Lasix 40 tablets and was taking 1/2 tablet or 20mg daily prior to admission.  On 7/10/2019 Mr. Mo's PCP Dr. DICK Reynolds ( or her representative) called [facility] and gave them a new prescription for Lasix 40mg tabs with the instructions to take 20mg daily.  Mr. Mo has been using his supply of Lasix 20mg to take a total dose of 40mg daily.  He is now needing a refill but [facility] does not have the newest dosage.  CCP discussed this with pt and at his request, CCP placed a call to Dr. DICK Reynolds's office and spoke with Aida.  She will give the request for a new prescription to Dr. Reynolds and they will see that he has the proper dosage.  Referred her to the visit notes from his visit with his cardiologist dated yesterday 7/16/2019 for any clarification needed and provided CCP contact information as well.  Mr. Mo knows to follow up with [facility] to make sure the new prescription has been called in and to call Dr. Reynolds directly if not.  Addendum:  Call placed back to Mr. Mo to discuss his broken scale.  He states that yes his was broken and he has not yet been able to get a replacement.  CCP offered a replacement scale if pt is able to come to the hospital to pick it up.  He was appreciative and states that he will come today before 5 pm.          Cassandra Tinsley RN    7/17/2019, 2:31 PM

## 2019-07-25 ENCOUNTER — OFFICE VISIT (OUTPATIENT)
Dept: SLEEP MEDICINE | Facility: HOSPITAL | Age: 64
End: 2019-07-25

## 2019-07-25 ENCOUNTER — READMISSION MANAGEMENT (OUTPATIENT)
Dept: CALL CENTER | Facility: HOSPITAL | Age: 64
End: 2019-07-25

## 2019-07-25 VITALS
BODY MASS INDEX: 26.57 KG/M2 | HEIGHT: 72 IN | SYSTOLIC BLOOD PRESSURE: 131 MMHG | OXYGEN SATURATION: 95 % | DIASTOLIC BLOOD PRESSURE: 66 MMHG | HEART RATE: 62 BPM | WEIGHT: 196.2 LBS

## 2019-07-25 DIAGNOSIS — Z79.4 TYPE 2 DIABETES MELLITUS WITH MODERATE NONPROLIFERATIVE RETINOPATHY WITHOUT MACULAR EDEMA, WITH LONG-TERM CURRENT USE OF INSULIN, UNSPECIFIED LATERALITY (HCC): ICD-10-CM

## 2019-07-25 DIAGNOSIS — Z99.89 OSA ON CPAP: Primary | ICD-10-CM

## 2019-07-25 DIAGNOSIS — G47.33 OSA ON CPAP: Primary | ICD-10-CM

## 2019-07-25 DIAGNOSIS — E11.3399 TYPE 2 DIABETES MELLITUS WITH MODERATE NONPROLIFERATIVE RETINOPATHY WITHOUT MACULAR EDEMA, WITH LONG-TERM CURRENT USE OF INSULIN, UNSPECIFIED LATERALITY (HCC): ICD-10-CM

## 2019-07-25 DIAGNOSIS — I50.42 CHRONIC COMBINED SYSTOLIC AND DIASTOLIC CONGESTIVE HEART FAILURE (HCC): ICD-10-CM

## 2019-07-25 PROBLEM — R06.83 SNORING: Status: RESOLVED | Noted: 2018-12-13 | Resolved: 2019-07-25

## 2019-07-25 PROCEDURE — G0463 HOSPITAL OUTPT CLINIC VISIT: HCPCS

## 2019-07-25 PROCEDURE — 99214 OFFICE O/P EST MOD 30 MIN: CPT | Performed by: INTERNAL MEDICINE

## 2019-07-25 NOTE — PROGRESS NOTES
[facility]  [address] [address]  Phone   Fax       SLEEP CLINIC FOLLOW UP PROGRESS NOTE.    Carlito MEHTA Chepe  1955  64 y.o.  male      PCP: Jyoti Reynolds MD      Date of visit: 7/25/2019    Chief Complaint   Patient presents with   • Sleep Apnea   • Follow-up       INTERM HISTORY:  This is a 64 y.o. years old patient who has a history of sleep apnea and is on CPAP.  Patient reports good compliance with the device.  Patient also has a history of diabetes mellitus and congestive heart failure was recently in the hospital.  Patient reports that his compliance is much better and he feels much better.  He uses oxygen at nighttime with the CPAP.    Sleep schedule  Normally goes to bed at 11 PM  Wakes up at 7 AM  Feels refreshed after waking up:     The Smart card downloaded on 7/25/2019 has been reviewed by me and shows the following..  Compliance; 85 %  > 4 hr use, 20 %  Average use of the device 5 hr with 53 minutes per night  Residual AHI: 6 /hr (normal less than 5)      PAST MEDICAL HISTORY:  · Obstructive sleep apnea  Past Medical History:   Diagnosis Date   • Acquired hypothyroidism    • Acute congestive heart failure (CMS/HCC)    • Acute respiratory failure with hypoxia (CMS/HCC)    • Acute sinusitis    • SYLVAIN (acute kidney injury) (CMS/HCC)     on CKD   • Anemia    • Arthritis    • CAD (coronary artery disease)    • Cancer (CMS/HCC)     skin   • Chronic combined systolic and diastolic congestive heart failure (CMS/HCC)    • Chronic ischemic heart disease    • CKD (chronic kidney disease)    • COPD (chronic obstructive pulmonary disease) (CMS/HCC)    • Depression    • Diabetes mellitus type 2, uncontrolled, without complications (CMS/HCC)    • Disease of thyroid gland    • Fatigue    • Frequent PVCs    • Heart attack (CMS/HCC)    • History of coronary artery disease     with remote history of bypass surgery in 2001   • History of  transfusion    • Hyperglycemia    • Hyperlipidemia    • Hypertension    • Hypertensive encephalopathy    • Mental status change     Acute   • YAW on CPAP    • Pleural effusion    • Pneumonia    • RBBB (right bundle branch block)    • Screening for prostate cancer 2011   • Stroke (CMS/[facility])    • TIA (transient ischemic attack)    • Type 2 diabetes mellitus (CMS/[facility])    • Urinary retention    • Vitamin D deficiency        MEDICATIONS: reviewed by me    Current Outpatient Medications:   •  amLODIPine (NORVASC) 5 MG tablet, Take 5 mg by mouth Daily., Disp: , Rfl:   •  aspirin 81 MG EC tablet, Take 81 mg by mouth Every Morning., Disp: , Rfl:   •  atorvastatin (LIPITOR) 40 MG tablet, Take 40 mg by mouth Every Night., Disp: , Rfl:   •  buPROPion XL (WELLBUTRIN XL) 150 MG 24 hr tablet, Take 150 mg by mouth Daily., Disp: , Rfl:   •  Cholecalciferol (VITAMIN D3) 5000 units capsule capsule, Take 5,000 Units by mouth Daily., Disp: , Rfl:   •  ferrous sulfate 325 (65 FE) MG tablet, Take 325 mg by mouth Daily With Breakfast., Disp: , Rfl:   •  furosemide (LASIX) 40 MG tablet, Take 1 tablet by mouth Daily., Disp: , Rfl:   •  Insulin Glargine (BASAGLAR KWIKPEN) 100 UNIT/ML injection pen, Inject 8 Units under the skin into the appropriate area as directed Every Night. Patient says he does not take consistently, Disp: , Rfl:   •  insulin glulisine (APIDRA) 100 UNIT/ML injection, Inject 2 Units under the skin into the appropriate area as directed 3 (Three) Times a Day Before Meals. Patient says he does not take consistently, Disp: , Rfl:   •  ipratropium-albuterol (COMBIVENT RESPIMAT)  MCG/ACT inhaler, Inhale 1 puff 4 (Four) Times a Day As Needed for Wheezing., Disp: , Rfl:   •  levothyroxine (SYNTHROID, LEVOTHROID) 75 MCG tablet, Take 75 mcg by mouth Daily., Disp: , Rfl:   •  losartan (COZAAR) 50 MG tablet, Take 1 tablet by mouth Daily., Disp: 30 tablet, Rfl: 11  •  metoprolol succinate XL (TOPROL-XL) 25 MG 24 hr tablet, Take 1  "tablet by mouth Daily., Disp: 30 tablet, Rfl: 11  •  tamsulosin (FLOMAX) 0.4 MG capsule 24 hr capsule, Take 1 capsule by mouth Every Night., Disp: , Rfl:   •  warfarin (COUMADIN) 2 MG tablet, Take 6 mg by mouth Daily. Patient takes 8 mg every Mon, Thu; 6 mg all other days, Disp: , Rfl:     Allergies   Allergen Reactions   • Fluoxetine Other (See Comments)   • Prozac [Fluoxetine Hcl] Other (See Comments)     shaking    reviewed by me    SOCIAL, FAMILY HISTORY: Medical records are reviewed and noted by me.    REVIEW OF SYSTEMS:   Pollock Sleepiness Scale :Total score: 9   Snoring: No  Morning headache: No  Nasal congestion: No  Leg movements: No  Leg swelling yes  Irregular heart beat no  Heart burn no    PHYSICAL EXAMINATION:  Vitals:    07/25/19 1300   BP: 131/66   BP Location: Left arm   Patient Position: Sitting   Pulse: 62   SpO2: 95%   Weight: 89 kg (196 lb 3.2 oz)   Height: 182.9 cm (72\")    Body mass index is 26.61 kg/m².    HEENT: pupils are round equal and reacting to light and accommodation, nasal passage is clear, no nasal polyps, no lymphadenopathy, throat is clear, oral airway Mallampati class 3  RESPRATORY SYSTEM: Breath sounds are equal on both sides and are normal, no wheezes or crackles  CARDIOVASULAR SYSTEM: Heart rate is regular without murmur  ABDOMEN: Soft, no ascites, no hepatosplenomegaly.  EXTREMITIES: No cyanosis, clubbing.  1+ pitting edema      ASSESSMENT AND PLAN:  · Obstructive sleep apnea, patient is using the CPAP with good compliance for treatment of sleep apnea.  I have reviewed the smart card down load and discussed with the patient the download data and encouarged the patient to continue to use the CPAP. The residual AHI is acceptable.  The device is benefiting the patient.  The patient is also instructed to get the CPAP supplies from the [employer] and see me in 1 year for follow-up.  The prescription for supplies has been sent to the [employer].  The [employer] company is " [place]  · Nocturnal hypoxia, patient is on oxygen at nighttime.  I have encouraged him to use it with the CPAP  · Diabetes mellitus  · History of congestive heart failure who is on diuretics recently got discharged from the hospital          Albert Wing MD, [facility]  Sleep Medicine.(Board-certified)  [facility]   7/25/2019

## 2019-07-25 NOTE — OUTREACH NOTE
CHF Week 3 Survey      Responses   Facility patient discharged from?  [place]   Does the patient have one of the following disease processes/diagnoses(primary or secondary)?  CHF   Week 3 attempt successful?  Yes   Call start time  1107   Call end time  1113   Discharge diagnosis  Acute on chronic congestive heart failure   Meds reviewed with patient/caregiver?  Yes   Is the patient taking all medications as directed (includes completed medication regime)?  Yes   Medication comments  Feels congested in  nose and I suggested he may wish to call PCP for suggestions of OTC allergy meds or he may wish to try to clear nostril with saline spary.    Has the patient kept scheduled appointments due by today?  Yes   Psychosocial issues?  No   What is the patient's perception of their health status since discharge?  Improving   Nursing interventions  Nurse provided patient education   Is the patient weighing daily?  No   Is the patient able to teach back Heart Failure Zones?  Yes   Is the patient able to teach back signs and symptoms of worsening condition? (i.e. weight gain, shortness of air, etc.)  Yes   Is the patient/caregiver able to teach back the hierarchy of who to call/visit for symptoms/problems? PCP, Specialist, Home health nurse, Urgent Care, ED, 911  Yes   Additional teach back comments  Pt is not weighing daily and I discussed this with him and what s/s to watch for regarding FO.    CHF Week 3 call completed?  Yes          Leeanna Conley RN

## 2019-07-29 ENCOUNTER — HOSPITAL ENCOUNTER (OUTPATIENT)
Dept: MRI IMAGING | Facility: HOSPITAL | Age: 64
Discharge: HOME | End: 2019-07-29
Admitting: NURSE PRACTITIONER

## 2019-07-29 DIAGNOSIS — I63.89 CEREBROVASCULAR ACCIDENT (CVA) DUE TO OTHER MECHANISM (HCC): ICD-10-CM

## 2019-07-29 PROCEDURE — 70553 MRI BRAIN STEM W/O & W/DYE: CPT

## 2019-07-29 PROCEDURE — 82565 ASSAY OF CREATININE: CPT

## 2019-07-29 PROCEDURE — A9577 INJ MULTIHANCE: HCPCS | Performed by: NURSE PRACTITIONER

## 2019-07-29 PROCEDURE — 0 GADOBENATE DIMEGLUMINE 529 MG/ML SOLUTION: Performed by: NURSE PRACTITIONER

## 2019-07-29 RX ADMIN — GADOBENATE DIMEGLUMINE 18 ML: 529 INJECTION, SOLUTION INTRAVENOUS at 15:03

## 2019-07-30 ENCOUNTER — TELEPHONE (OUTPATIENT)
Dept: NEUROLOGY | Facility: CLINIC | Age: 64
End: 2019-07-30

## 2019-07-30 LAB — CREAT BLDA-MCNC: 1.9 MG/DL (ref 0.6–1.3)

## 2019-07-30 NOTE — TELEPHONE ENCOUNTER
Dr Salas with [facility] called in reference to patient's MRI he had completed.  Dr Salas is requesting a phone call back.

## 2019-07-30 NOTE — TELEPHONE ENCOUNTER
I took a phone call from  and/or concerning this patient's most recent brain MRI which showed 2 new infarcts,  5 mm left temporal and right frontal.  + hemosiderin deposition, throughout,    [Vs Dec 2018 MRI]    Summary: He is having multiple CVAs, on old and new MRI.      DDX- amyloid vs other [ vasculitis or cardioembolic]    Thus the infarct are continuing, multiple vascular regions.    Record review from the January office visit noted   1] cardial infarction in 2011 with bilateral infarcts on MRI and abnormal NEISHA but no atrial fibrillation treated with aspirin plus Lipitor   2] February 2018 evaluation for new infarcts in the setting of hypertensive urgency and evaluated by Dr. Reddy, noted to have some noncompliance.  Aggressive control of risk factors urged.  3] May 2018 several new infarcts noted with altered mental status on admission.  Discharged on aspirin plus Plavix plus statin.  Eliquis was discussed  4] December 2018 presented with pleural effusion.  Possible small cortical acute infarcts on MRI.  This was not definite as he also had acute on chronic heart failure.  Medicines were not changed  5] January 17, 2019-office visit tolerating aspirin plus Plavix plus Lipitor.  He he noted residual memory.  Still employed.  Al[place] discussed with Dr. Trujillo and empiric treatment with anticoagulation discussed because of recurrent strokes on aspirin and Plavix.  The plan was to have patient discuss with cardiology the same week  6] cardiology visit with Dr. Murguia.  NEISHA was negative.  ZIO Patch was negative for fibrillation.  Due to stroke history but also chronic kidney disease warfarin was recommended.  EKG revealed right bundle branch block.  Troponin chronically elevated  7] out reach note from Katrin Coronado reconciled his medicines.  Metoprolol and Plavix have been stopped  8] February 6 PCP visit noted fatigue  9] hospital admission April 23 through April 25 for coronary artery disease presented with  shortness of breath.  Medium size infarct in the apex noted on stress test.  Still on Coumadin    I will discuss with Ms. Gagnon.  APpartently Dr Jackson has been involved with this case. 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

## 2019-07-31 ENCOUNTER — ANTICOAGULATION VISIT (OUTPATIENT)
Dept: PHARMACY | Facility: HOSPITAL | Age: 64
End: 2019-07-31

## 2019-07-31 ENCOUNTER — OFFICE VISIT (OUTPATIENT)
Dept: NEUROLOGY | Facility: CLINIC | Age: 64
End: 2019-07-31

## 2019-07-31 VITALS
SYSTOLIC BLOOD PRESSURE: 152 MMHG | WEIGHT: 202.8 LBS | HEIGHT: 72 IN | RESPIRATION RATE: 18 BRPM | DIASTOLIC BLOOD PRESSURE: 84 MMHG | BODY MASS INDEX: 27.47 KG/M2 | HEART RATE: 65 BPM | OXYGEN SATURATION: 96 %

## 2019-07-31 DIAGNOSIS — I15.2 HIGH BLOOD PRESSURE ASSOCIATED WITH DIABETES (HCC): ICD-10-CM

## 2019-07-31 DIAGNOSIS — I25.9 CHRONIC ISCHEMIC HEART DISEASE: ICD-10-CM

## 2019-07-31 DIAGNOSIS — I63.10 CEREBROVASCULAR ACCIDENT (CVA) DUE TO EMBOLISM OF PRECEREBRAL ARTERY (HCC): ICD-10-CM

## 2019-07-31 DIAGNOSIS — E11.59 HIGH BLOOD PRESSURE ASSOCIATED WITH DIABETES (HCC): ICD-10-CM

## 2019-07-31 DIAGNOSIS — E11.3399 TYPE 2 DIABETES MELLITUS WITH MODERATE NONPROLIFERATIVE RETINOPATHY WITHOUT MACULAR EDEMA, WITH LONG-TERM CURRENT USE OF INSULIN, UNSPECIFIED LATERALITY (HCC): ICD-10-CM

## 2019-07-31 DIAGNOSIS — I63.10 CEREBROVASCULAR ACCIDENT (CVA) DUE TO EMBOLISM OF PRECEREBRAL ARTERY (HCC): Primary | ICD-10-CM

## 2019-07-31 DIAGNOSIS — E78.49 OTHER HYPERLIPIDEMIA: ICD-10-CM

## 2019-07-31 DIAGNOSIS — Z79.4 TYPE 2 DIABETES MELLITUS WITH MODERATE NONPROLIFERATIVE RETINOPATHY WITHOUT MACULAR EDEMA, WITH LONG-TERM CURRENT USE OF INSULIN, UNSPECIFIED LATERALITY (HCC): ICD-10-CM

## 2019-07-31 DIAGNOSIS — I25.10 CORONARY ARTERY DISEASE INVOLVING NATIVE CORONARY ARTERY OF NATIVE HEART WITHOUT ANGINA PECTORIS: ICD-10-CM

## 2019-07-31 PROBLEM — I63.9 RECURRENT STROKES: Status: ACTIVE | Noted: 2018-02-20

## 2019-07-31 PROBLEM — G47.00 INSOMNIA: Status: ACTIVE | Noted: 2019-02-06

## 2019-07-31 PROBLEM — Z09 HOSPITAL DISCHARGE FOLLOW-UP: Status: ACTIVE | Noted: 2019-05-31

## 2019-07-31 PROBLEM — Z12.11 COLON CANCER SCREENING: Status: ACTIVE | Noted: 2018-10-30

## 2019-07-31 PROBLEM — R59.9 ENLARGED LYMPH NODES: Status: ACTIVE | Noted: 2018-10-30

## 2019-07-31 PROBLEM — D50.9 IRON DEFICIENCY ANEMIA: Status: ACTIVE | Noted: 2018-10-02

## 2019-07-31 PROBLEM — R26.89 FUNCTIONAL GAIT ABNORMALITY: Status: ACTIVE | Noted: 2018-10-30

## 2019-07-31 PROBLEM — I25.2 HISTORY OF MYOCARDIAL INFARCTION: Status: ACTIVE | Noted: 2019-02-06

## 2019-07-31 LAB
INR PPP: 2.8 (ref 0.91–1.09)
PROTHROMBIN TIME: 33.8 SECONDS (ref 10–13.8)

## 2019-07-31 PROCEDURE — 99214 OFFICE O/P EST MOD 30 MIN: CPT | Performed by: NURSE PRACTITIONER

## 2019-07-31 PROCEDURE — 85610 PROTHROMBIN TIME: CPT

## 2019-07-31 PROCEDURE — 36416 COLLJ CAPILLARY BLOOD SPEC: CPT

## 2019-07-31 RX ORDER — WARFARIN SODIUM 2 MG/1
TABLET ORAL
Qty: 270 TABLET | Refills: 0 | Status: SHIPPED | OUTPATIENT
Start: 2019-07-31 | End: 2019-08-30 | Stop reason: SDUPTHER

## 2019-07-31 RX ORDER — WARFARIN SODIUM 6 MG/1
TABLET ORAL
COMMUNITY
Start: 2019-01-28 | End: 2019-08-30 | Stop reason: SDUPTHER

## 2019-07-31 NOTE — PROGRESS NOTES
"DOS: 2019  NAME: Carlito Mo   : 1955  PCP: Jyoti Reynolds MD    Chief Complaint   Patient presents with   • Cerebrovascular Accident     6 month, follow up      SUBJECTIVE  Neurological Problem:  64 y.o. LHW male with CAD (CABG x 4, ), DM, CKD, HTN, HLD,CHF, YAW (on CPAP) and strokes who presents today for stroke f/u. He is unaccompanied but spouse is on speaker phone.     Interval History:   Mr. Mo has a history of recurrent strokes:   -Dec 2017: presented with two weeks of personality/behavioral changes that was thought to be encephalopathy. He was prophylactically placed on acyclovir and underwent an LP that was unremarkable. He had been on  mg since MI in , followed by Dr. Murguia. His MRI showed bilateral strokes that were likely cardioembolic. He had a NESIHA that showed akinetic segments, moderately dilated LA and small PFO. Prolonged cardiac monitoring showed no evidence of afib. D/C summary notes patient was discharged on  mg and Lipitor 80 mg.     -2018: presented again with AMS and alteration in speech in the setting of hypertensive urgency and hyperglycemia secondary to medication noncompliance. He was found to have new infarcts in multiple arterial distributions. Per Dr Reddy's notes, anticoagulation was considered; however with no definitive evidence for source of embolism and patient's noncompliance patient was discharged on DAPT, statin and urged better medication compliance and aggressive control of risk factors.     -May 2018: Patient again presented with AMS and reports of medication noncompliance, hypertensive and hyperglycemic and was found to have small acute bilateral cerebral hemisphere infarcts. Per review of records, cardiology recommended empiric treatment with Eliquis but deferred to neurology. Per D/C summary patient was discharged on ASA 81 mg, Plavix 75 mg and statin.      -Dec 2018: Patient presented with two weeks of feeling \"in a fog\" and was " found to have a large right pleural effusion s/p thoracentesis with acute on chronic CHF. He was evaluated by Dr. Marie for AMS. MRI showed DWI abnormalities, T2 shine through vs. Small cortical acute infarcts. No changes were made to patient's regimen.     -Jan 2019: F/U office visit, on on ASA, Plavix and Lipitor 40 mg, no new neurologic events. D/W Dr. Trujillo and given patients repeated failures on ASA and Plavix, recommended empiric treatment with anticoagulation, along with f/u MRI brain in 6 months. He subsequently followed up with cardiology and started on anticoagulation, warfarin being monitored at INR clinic.    -Apr 2019: presented to ER with CP, indeterminate troponins, stress test showed infarct of apex/peripical areas, no ischemia, mildly reduced EF. INR on admission 1.72.  D/C'd on warfarin, ASA 81 and Lipitor 40    -July 2019: Admitted to [facility] with CHF exacerbation. INR on admission 1.62, eventually therapeutic during course of hospital stay, then again subtherapeutic following d/c at INR clinic on 7/17 at 1.8.    He presents today after having an outpatient f/u MRI done yesterday (results reported to Dr. Martínez) that again showed two new small bilateral hemispheric infarcts, small vessel ischemic changes and also multifocal areas of hemosiderin deposition, concerning for amyloid vs  cardioembolic etiology or other vasculopathy.     He denies any new stroke/TIA symptoms, including no headaches, vision changes, speech difficulty or unilateral weakness. He denies any recurrence of confusion. He does report some SOA but denies CP. He reports being compliant with medications. INR today is therapeutic.   He denies smoking or alcohol use. He has an autistic son. He is retired .       Review of Systems:Review of Systems   Constitutional: Negative for activity change, appetite change and fatigue.   HENT: Negative for facial swelling, trouble swallowing and voice change.    Eyes: Negative  for photophobia, pain and visual disturbance.   Respiratory: Negative for chest tightness, shortness of breath and wheezing.    Cardiovascular: Positive for leg swelling. Negative for chest pain and palpitations.   Gastrointestinal: Positive for diarrhea. Negative for abdominal pain, constipation, nausea and vomiting.   Endocrine: Negative for polydipsia and polyphagia.   Musculoskeletal: Negative for back pain, joint swelling and neck pain.   Skin: Negative for rash and wound.   Neurological: Positive for weakness and light-headedness. Negative for dizziness, tremors, seizures, syncope, facial asymmetry, speech difficulty, numbness and headaches.   Hematological: Bruises/bleeds easily.   Psychiatric/Behavioral: Positive for sleep disturbance. Negative for agitation, behavioral problems, confusion, decreased concentration, dysphoric mood, hallucinations, self-injury and suicidal ideas. The patient is not nervous/anxious and is not hyperactive.     Above ROS reviewed    Current Medications:   Current Outpatient Medications:   •  amLODIPine (NORVASC) 5 MG tablet, Take 5 mg by mouth Daily., Disp: , Rfl:   •  aspirin 81 MG EC tablet, Take 81 mg by mouth Every Morning., Disp: , Rfl:   •  atorvastatin (LIPITOR) 40 MG tablet, Take 40 mg by mouth Every Night., Disp: , Rfl:   •  Cholecalciferol (VITAMIN D3) 5000 units capsule capsule, Take 5,000 Units by mouth Daily., Disp: , Rfl:   •  ferrous sulfate 325 (65 FE) MG tablet, Take 325 mg by mouth Daily With Breakfast., Disp: , Rfl:   •  furosemide (LASIX) 40 MG tablet, Take 1 tablet by mouth Daily., Disp: , Rfl:   •  Insulin Glargine (BASAGLAR KWIKPEN) 100 UNIT/ML injection pen, Inject 8 Units under the skin into the appropriate area as directed Every Night. Patient says he does not take consistently, Disp: , Rfl:   •  insulin glulisine (APIDRA) 100 UNIT/ML injection, Inject 2 Units under the skin into the appropriate area as directed 3 (Three) Times a Day Before Meals.  Patient says he does not take consistently, Disp: , Rfl:   •  losartan (COZAAR) 50 MG tablet, Take 1 tablet by mouth Daily., Disp: 30 tablet, Rfl: 11  •  metoprolol succinate XL (TOPROL-XL) 25 MG 24 hr tablet, Take 1 tablet by mouth Daily., Disp: 30 tablet, Rfl: 11  •  warfarin (COUMADIN) 2 MG tablet, Take 6 mg by mouth Daily. Patient takes 8 mg every Mon, Thu; 6 mg all other days, Disp: , Rfl:   •  warfarin (COUMADIN) 6 MG tablet, Takes 6mg Daily, 8mg Mondays and Thursdays, Disp: , Rfl:   •  buPROPion XL (WELLBUTRIN XL) 150 MG 24 hr tablet, Take 150 mg by mouth Daily., Disp: , Rfl:   •  ipratropium-albuterol (COMBIVENT RESPIMAT)  MCG/ACT inhaler, Inhale 1 puff 4 (Four) Times a Day As Needed for Wheezing., Disp: , Rfl:   •  levothyroxine (SYNTHROID, LEVOTHROID) 75 MCG tablet, Take 75 mcg by mouth Daily., Disp: , Rfl:   •  tamsulosin (FLOMAX) 0.4 MG capsule 24 hr capsule, Take 1 capsule by mouth Every Night., Disp: , Rfl:   •  warfarin (COUMADIN) 2 MG tablet, Take three tablets by mouth daily, or as directed by the [facility]., Disp: 270 tablet, Rfl: 0    The following portions of the patient's history were reviewed and updated as appropriate: allergies, current medications, past family history, past medical history, past social history, past surgical history and problem list.    OBJECTIVE  Vitals:    07/31/19 1302   BP: 152/84   Pulse: 65   Resp: 18   SpO2: 96%       Diagnostics:  MRI brain 7/30/19: IMPRESSION:  There is a new area of diffusion hyperintensity involving  the white matter of the left temporal lobe posteriorly and with a second  smaller infarct involving the subcortical white matter of the right  frontal lobe superiorly and posteriorly. These are in multiple vascular  distributions. Considerations would include amyloid angiopathy, a  proximal/cardiac source or underlying vasculopathy.      EKG 7/16/19: Sinus David      Laboratory Results:         Lab Results   Component Value  Date    WBC 7.88 07/04/2019    HGB 10.5 (L) 07/04/2019    HCT 34.0 (L) 07/04/2019    MCV 78.9 (L) 07/04/2019     07/04/2019     Lab Results   Component Value Date    GLUCOSE 100 (H) 07/06/2019    BUN 34 (H) 07/06/2019    CREATININE 1.90 (H) 07/29/2019    EGFRIFNONA 35 (L) 07/06/2019    EGFRIFAFRI 74 12/12/2017    BCR 17.7 07/06/2019    K 4.0 07/06/2019    CO2 28.5 07/06/2019    CALCIUM 8.9 07/06/2019    PROTENTOTREF 6.7 12/12/2017    ALBUMIN 3.40 (L) 07/03/2019    LABIL2 1.5 05/31/2019    AST 15 07/03/2019    ALT 17 07/03/2019     Lab Results   Component Value Date    HGBA1C 8.30 (H) 07/03/2019     Lab Results   Component Value Date    CHOL 124 12/03/2018    CHOL 275 (H) 05/02/2018    CHOL 257 (H) 02/18/2018     Lab Results   Component Value Date    HDL 53 04/09/2019    HDL 49 12/03/2018    HDL 50 05/02/2018     Lab Results   Component Value Date    LDL 66 04/09/2019    LDL 61 12/03/2018     (H) 05/02/2018     Lab Results   Component Value Date    TRIG 84 04/09/2019    TRIG 72 12/03/2018    TRIG 81 05/02/2018     No results found for: RPR  Lab Results   Component Value Date    TSH 2.610 07/04/2019     Lab Results   Component Value Date    RCRNEDIR76 416 07/04/2019     Lab Results   Component Value Date    INR 2.8 (H) 07/31/2019    INR 1.8 (H) 07/17/2019    INR 2.33 (H) 07/06/2019    PROTIME 33.8 (H) 07/31/2019    PROTIME 21.0 (H) 07/17/2019    PROTIME 25.3 (H) 07/06/2019         Physical Examination:   General Appearance:   Well developed, well nourished, somewhat dissheveled, alert, and cooperative.  HEENT: Normocephalic.    Neck and Spine: Normal range of motion.  Normal alignment. No mass or tenderness. No bruits.  Cardiac: Regular rate and rhythm.   Peripheral Vasculature: Radial pulses are equal and symmetric. No signs of distal embolization.  Extremities:    BLE edema. .  Skin:    No rashes or birth marks. Pallor.  Psychiatric:    Euthymic. Mildly flat.    Neurological examination:  Higher  Integrative  Function: Oriented to time, place and person. Normal registration, attention span and concentration. Normal language including comprehension, spontaneous speech, repetition, reading, naming and vocabulary. No neglect. Normal fund of knowledge and higher integrative function.  CN II: Pupils are equal, round, and reactive to light. Normal visual acuity and visual fields.    CN III IV VI: Extraocular movements are full without nystagmus.   CN V: Normal facial sensation and strength of muscles of mastication.  CN VII: Facial movements are symmetric. No weakness.  CN VIII:   Auditory acuity is normal.  CN IX & X:   Symmetric palatal movement.  CN XI: Sternocleidomastoid and trapezius are normal.  No weakness.  CN XII:   The tongue is midline.  No atrophy or fasciculations.  Motor: Normal muscle strength, bulk and tone in upper and lower extremities.  No fasciculations, rigidity, spasticity, or abnormal movements.   Sensation: Normal to light touch in arms and legs. Normal graphesthesia and no extinction on DSS.  Station and Gait: Normal gait and station.    Coordination: Finger to nose test shows no dysmetria.  Heel to shin normal.    Impression:  Mr. Mo presents for follow-up for recurrent infarcts in multiple vascular territories with repeat MRI done yesterday again showing new small b/l strokes. Previously he was treated with DAPT + statin with recurrence in the setting ?compliance and uncontrolled RFs. He has been treated with warfarin + ASA 81 mg + statin since January but has had of multiple documented instances of subtherapeutic INR. The etiology of his recurrent insults is not clear but distribution points to a proximal source ie cardioembolic (LV areas with severe hypokinesis, dilated LA and PFO), also however, his most recent MRI also shows multiple focal areas of hemosiderin deposition concerning for amyloid and increased risk of ICH with anticoagulation. I reviewed this case with Dr. Kramer  today who recommends close monitoring at INR clinic and referral to  vascular neurolgoy for further evaluation and diagnostic/treatment recommendations. I discussed this with patient (and spouse via speaker phone) and they voice understanding and agree with plan. We reviewed the signs/symptoms of stroke and the importance of calling 911 if he were to have any of these. I reiterated the importance of BP control as he was elevated today. He will monitor regularly.     Plan:     Referral to [facility] vascular neurology  For now continue current medication regimen  Monitor BP regularly, SPB goal < 130  Call 911 for signs/symptoms of stroke   Secondary stroke prevention: Ideal targets for stroke prevention would be Blood pressure < 130/80; B12 > 500 TSH in normal range and LDL < 70; HbA1c < 6.5 and smoking cessation if applicable.    I spent 30 minutes face to face with patient (and spouse via phone), with  > 50% spent counseling patient regarding diagnosis, review of diagnostics, personal risk factors for stroke and importance of risk factor control for stroke prevention.     Carlito was seen today for cerebrovascular accident.    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

## 2019-07-31 NOTE — PROGRESS NOTES
Anticoagulation Clinic Progress Note    Anticoagulation Summary  As of 2019    INR goal:   2.0-3.0   TTR:   44.0 % (5.6 mo)   INR used for dosin.8 (2019)   Warfarin maintenance plan:   8 mg every Mon, Thu; 6 mg all other days   Weekly warfarin total:   46 mg   No change documented:   Kae Alberts   Plan last modified:   Gregory Mei RPH (6/3/2019)   Next INR check:   8/15/2019   Priority:   High   Target end date:       Indications    Cerebrovascular accident (CVA) due to embolism of precerebral artery (CMS/HCC) [I63.10]             Anticoagulation Episode Summary     INR check location:       Preferred lab:       Send INR reminders to:   [facility]    Comments:         Anticoagulation Care Providers     Provider Role Specialty Phone number    Amber Murguia MD Referring Cardiology [phone]          Clinic Interview:  Patient Findings     Negatives:   Signs/symptoms of thrombosis, Signs/symptoms of bleeding,   Laboratory test error suspected, Change in health, Change in alcohol use,   Change in activity, Upcoming invasive procedure, Emergency department   visit, Upcoming dental procedure, Missed doses, Extra doses, Change in   medications, Change in diet/appetite, Hospital admission, Bruising, Other   complaints      Clinical Outcomes     Negatives:   Major bleeding event, Thromboembolic event,   Anticoagulation-related hospital admission, Anticoagulation-related ED   visit, Anticoagulation-related fatality        INR History:  Anticoagulation Monitoring 2019   INR 2.4 1.8 2.8   INR Date 2019   INR Goal 2.0-3.0 2.0-3.0 2.0-3.0   Trend Same Same Same   Last Week Total 34 mg 46 mg 46 mg   Next Week Total 46 mg 48 mg 46 mg   Sun 6 mg 6 mg 6 mg   Mon 8 mg 8 mg 8 mg   Tue 6 mg 6 mg 6 mg   Wed 6 mg 8 mg (); Otherwise 6 mg 6 mg   Thu 8 mg 8 mg 8 mg   Fri 6 mg 6 mg 6 mg   Sat 6 mg 6 mg 6 mg   Visit Report - - -   Some recent  data might be hidden       Plan:  1. INR is therapeutic today- see above in Anticoagulation Summary.   Will instruct Carlito Mo to continue their warfarin regimen- see above in Anticoagulation Summary.  2. Follow up in 2 weeks.  3. Patient declines warfarin refills.  4. Verbal and written information provided. Patient expresses understanding and has no further questions at this time.    Kae Alberts

## 2019-08-01 ENCOUNTER — READMISSION MANAGEMENT (OUTPATIENT)
Dept: CALL CENTER | Facility: HOSPITAL | Age: 64
End: 2019-08-01

## 2019-08-01 NOTE — OUTREACH NOTE
CHF Week 4 Survey      Responses   Facility patient discharged from?  [place]   Does the patient have one of the following disease processes/diagnoses(primary or secondary)?  CHF   Week 4 attempt successful?  Yes   Call start time  1437   Call end time  1442   Meds reviewed with patient/caregiver?  Yes   Is the patient taking all medications as directed (includes completed medication regime)?  Yes   Medication comments  Coumadin dosage is being re-calculated due to INR.   Has the patient kept scheduled appointments due by today?  Yes   What is the patient's perception of their health status since discharge?  Improving   Nursing interventions  Nurse provided patient education   Is the patient weighing daily?  No   Is the patient able to teach back Heart Failure Zones?  Yes   Additional teach back comments  Pt states he is doing better but is tired. Pt reports he has an autistic son who is difficult to mangae. Pt has been to most all follow up appointmetns.   Week 4 Call Completed?  Yes   Would the patient like one additional call?  No   Graduated  Yes   Did the patient feel the follow up calls were helpful during their recovery period?  Yes   Was the number of calls appropriate?  Yes          Heike Cox RN

## 2019-08-30 ENCOUNTER — ANTICOAGULATION VISIT (OUTPATIENT)
Dept: PHARMACY | Facility: HOSPITAL | Age: 64
End: 2019-08-30

## 2019-08-30 ENCOUNTER — OFFICE VISIT (OUTPATIENT)
Dept: CARDIOLOGY | Facility: CLINIC | Age: 64
End: 2019-08-30

## 2019-08-30 VITALS
RESPIRATION RATE: 16 BRPM | BODY MASS INDEX: 25.47 KG/M2 | HEART RATE: 61 BPM | SYSTOLIC BLOOD PRESSURE: 140 MMHG | WEIGHT: 188 LBS | DIASTOLIC BLOOD PRESSURE: 84 MMHG | HEIGHT: 72 IN

## 2019-08-30 DIAGNOSIS — I63.9 RECURRENT STROKES (HCC): ICD-10-CM

## 2019-08-30 DIAGNOSIS — I15.2 HIGH BLOOD PRESSURE ASSOCIATED WITH DIABETES (HCC): ICD-10-CM

## 2019-08-30 DIAGNOSIS — I50.22 CHRONIC SYSTOLIC CONGESTIVE HEART FAILURE (HCC): Primary | ICD-10-CM

## 2019-08-30 DIAGNOSIS — E11.59 HIGH BLOOD PRESSURE ASSOCIATED WITH DIABETES (HCC): ICD-10-CM

## 2019-08-30 DIAGNOSIS — I25.10 CORONARY ARTERY DISEASE INVOLVING NATIVE CORONARY ARTERY OF NATIVE HEART WITHOUT ANGINA PECTORIS: ICD-10-CM

## 2019-08-30 LAB
INR PPP: 2.8 (ref 0.91–1.09)
PROTHROMBIN TIME: 33.6 SECONDS (ref 10–13.8)

## 2019-08-30 PROCEDURE — 93000 ELECTROCARDIOGRAM COMPLETE: CPT | Performed by: INTERNAL MEDICINE

## 2019-08-30 PROCEDURE — 36416 COLLJ CAPILLARY BLOOD SPEC: CPT

## 2019-08-30 PROCEDURE — 85610 PROTHROMBIN TIME: CPT

## 2019-08-30 PROCEDURE — 99214 OFFICE O/P EST MOD 30 MIN: CPT | Performed by: INTERNAL MEDICINE

## 2019-08-30 RX ORDER — WARFARIN SODIUM 2 MG/1
TABLET ORAL
Qty: 300 TABLET | Refills: 0 | Status: SHIPPED | OUTPATIENT
Start: 2019-08-30 | End: 2019-10-21 | Stop reason: SDUPTHER

## 2019-08-30 NOTE — PROGRESS NOTES
PATIENTINFORMATION    Date of Office Visit: 2019  Encounter Provider: Amber Murguia MD  Place of Service: [facility] CARDIOLOGY  Patient Name: Carlito Mo  : 1955    Subjective:     Encounter Date:2019      Patient ID: Carlito Mo is a 64 y.o. male.      History of Present Illness    Mr. Carlito Mo is a male patient with a history of CAD/MI (CABG ), HTN, hyperlipidemia, type 2 diabetes mellitus, anemia, chronic kidney disease, and basal cell carcinoma.  I first saw him in 2017 when he was admitted with altered mental status.  He was diagnosed with multiple small acute infarcts.  His echocardiogram from 2017 showed mild left ventricular hypertrophy, mild left atrial enlargement, negative bubble study, normal LV systolic function with an ejection fraction of 68%.  Transesophageal echo in 2017 showed that his LV function was mildly low with an ejection fraction of 45% with focal wall motion abnormalities primarily in the septum and anterior wall and apex.  Small patent foramen ovale was noted there was no significant valvular heart disease or cardiac source of embolus minus the small PFO.  Holter monitor showed a couple of short bursts of SVT but no sustained arrhythmia.      He came back to the hospital in 2017 again with altered mental status.  At this time he had an echocardiogram which shows ejection fraction was 63%, grade 1 diastolic dysfunction and no significant valvular heart disease.  He wore a Zio patch which showed PACs, PVCs and nonsustained SVT.     He was back in the hospital in May 2018 with acute mental status changes and a blood pressure of 212/109.  He was noted at that time to have a chronically elevated troponin without acute EKG changes.     Sadly he was hospitalized again in 2018 after he was hypoxic and persistently lethargic after a colonoscopy.  During that admission he did have an echocardiogram  which revealed his LV function had decreased and his ejection fraction was now 46%.  There was grade II diastolic dysfunction and mildly elevated pulmonary pressures with a left pleural effusion.  He was treated for pneumonia.  He was also diuresed and seemed to be back to his baseline although he was discharged with 2 liters of oxygen at night.      He comes in today for follow-up.  He has shortness of breath but says is at baseline.  He denies orthopnea.  He reports compliance with his CPAP.  He has chronic left lower extremity edema from where his vein was removed.  He monitors his blood pressure at home and its normal.     Review of Systems   Constitution: Positive for chills, malaise/fatigue and weight gain. Negative for fever and weight loss.   HENT: Negative for ear pain, hearing loss, nosebleeds and sore throat.    Eyes: Positive for blurred vision. Negative for double vision, pain, vision loss in left eye and vision loss in right eye.   Cardiovascular:        See history of present illness.   Respiratory: Positive for shortness of breath. Negative for cough, sleep disturbances due to breathing, snoring and wheezing.    Endocrine: Positive for cold intolerance. Negative for heat intolerance and polyuria.   Skin: Negative for itching, poor wound healing and rash.   Musculoskeletal: Positive for joint swelling. Negative for joint pain and myalgias.   Gastrointestinal: Negative for abdominal pain, diarrhea, hematochezia, nausea and vomiting.   Genitourinary: Negative for hematuria and hesitancy.   Neurological: Positive for headaches. Negative for numbness, paresthesias and seizures.   Psychiatric/Behavioral: Positive for depression. The patient is not nervous/anxious.            ECG 12 Lead  Date/Time: 8/30/2019 11:55 AM  Performed by: Amber Murguia MD  Authorized by: Amber Murguia MD   Comparison: compared with previous ECG from 1/18/2019  Similar to previous ECG  Rhythm: sinus rhythm  BPM:  "61  Conduction: right bundle branch block  Q waves: V2 and V3      Clinical impression: abnormal EKG               Objective:     /84 (BP Location: Left arm, Patient Position: Sitting, Cuff Size: Adult)   Pulse 61   Resp 16   Ht 182.9 cm (72\")   Wt 85.3 kg (188 lb)   BMI 25.50 kg/m²  Body mass index is 25.5 kg/m².     Physical Exam   Constitutional: He appears well-developed.   HENT:   Head: Normocephalic and atraumatic.   Eyes: Conjunctivae and lids are normal. Pupils are equal, round, and reactive to light. Lids are everted and swept, no foreign bodies found.   Neck: Normal range of motion. No JVD present. Carotid bruit is not present. No tracheal deviation present. No thyroid mass present.   Cardiovascular: Normal rate, regular rhythm and normal heart sounds.   Pulses:       Dorsalis pedis pulses are 2+ on the right side, and 2+ on the left side.   LLE edema   Pulmonary/Chest: Effort normal and breath sounds normal.   Abdominal: Normal appearance and bowel sounds are normal.   Musculoskeletal: Normal range of motion.   Neurological: He is alert. He has normal strength.   Skin: Skin is warm, dry and intact.   Psychiatric: He has a normal mood and affect. His behavior is normal.   Vitals reviewed.          Assessment/Plan:       1.  Acute on chronic systolic and diastolic heart failure.  On oral Lasix.  BUN and creatinine stable.  He appears euvolemic.  Heart Failure  Subjective/Objective  • The patient reports dyspnea    • Physical exam findings negative for elevated JVP.        2.  History of stroke.  Transesophageal echocardiogram did not show cardiac source of embolus.  Zio patch did not show atrial fibrillation.  I reviewed the notes from his neurologist which stated he has had further strokes on aspirin and Plavix and they recommended anticoagulation.  Given chronic kidney disease, I recommend warfarin.  I referred him to the anticoagulation clinic for INR monitoring.     3.  History of coronary " artery disease with remote history of bypass surgery in 2001.  Coronary Artery Disease  Assessment  • The patient has no angina     Plan  • Lifestyle modifications discussed include adhering to a heart healthy diet, regular exercise and regular monitoring of cholesterol and blood pressure     Subjective - Objective  • Current antiplatelet therapy includes aspirin 81 mg      3.  Hypertension.  Controlled.   4.  Hyperlipidemia.  He is supposed to be on atorvastatin but has questionable compliance.  5.  Diabetes  6.  Chronic kidney disease  7.  Fatigue and lack of energy.  8.  Right bundle branch block  9.  Chronically elevated troponin.    6 months with [facility]    Orders Placed This Encounter   Procedures   • ECG 12 Lead     This order was created via procedure documentation        Discharge Medications           Accurate as of 8/30/19  3:26 PM. If you have any questions, ask your nurse or doctor.               Changes to Medications      Instructions Start Date   warfarin 2 MG tablet  Commonly known as:  COUMADIN  What changed:    · how much to take  · how to take this  · when to take this  · additional instructions   Take 4 tablets (8 mg) by mouth every Mon and Thurs, and take 3 tablets (6 mg) by mouth all other days or as directed.         Continue These Medications      Instructions Start Date   amLODIPine 5 MG tablet  Commonly known as:  NORVASC   5 mg, Oral, Daily      APIDRA 100 UNIT/ML injection  Generic drug:  insulin glulisine   2 Units, Subcutaneous, 3 Times Daily Before Meals, Patient says he does not take consistently      aspirin 81 MG EC tablet   81 mg, Oral, Every Morning      atorvastatin 40 MG tablet  Commonly known as:  LIPITOR   40 mg, Oral, Nightly      buPROPion  MG 24 hr tablet  Commonly known as:  WELLBUTRIN XL   150 mg, Oral, Daily      ferrous sulfate 325 (65 FE) MG tablet   325 mg, Oral, Daily With Breakfast      furosemide 40 MG tablet  Commonly known as:  LASIX   40 mg, Oral, Daily       Insulin Glargine 100 UNIT/ML injection pen  Commonly known as:  BASAGLAR KWIKPEN   8 Units, Subcutaneous, Nightly, Patient says he does not take consistently      levothyroxine 75 MCG tablet  Commonly known as:  SYNTHROID, LEVOTHROID   75 mcg, Oral, Daily      losartan 50 MG tablet  Commonly known as:  COZAAR   50 mg, Oral, Daily      metoprolol succinate XL 25 MG 24 hr tablet  Commonly known as:  TOPROL-XL   25 mg, Oral, Every 24 Hours Scheduled      tamsulosin 0.4 MG capsule 24 hr capsule  Commonly known as:  FLOMAX   1 capsule, Oral, Nightly      vitamin D3 5000 units capsule capsule   5,000 Units, Oral, Daily         Stop These Medications    ipratropium-albuterol  MCG/ACT inhaler  Commonly known as:  COMBIVENT RESPIMAT  Stopped by:  MD Amber Padilla MD  08/30/19  3:26 PM

## 2019-08-30 NOTE — PROGRESS NOTES
Anticoagulation Clinic Progress Note    Anticoagulation Summary  As of 2019    INR goal:   2.0-3.0   TTR:   52.4 % (6.6 mo)   INR used for dosin.8 (2019)   Warfarin maintenance plan:   8 mg every Mon, Thu; 6 mg all other days   Weekly warfarin total:   46 mg   No change documented:   Gregory Mei RPH   Plan last modified:   Gregory Mei RPH (6/3/2019)   Next INR check:   2019   Priority:   High   Target end date:       Indications    Recurrent strokes (CMS/HCC) [I63.9]             Anticoagulation Episode Summary     INR check location:       Preferred lab:       Send INR reminders to:    [facility]    Comments:         Anticoagulation Care Providers     Provider Role Specialty Phone number    Amber Murguia MD Referring Cardiology [phone]          Clinic Interview:  Patient Findings     Negatives:   Signs/symptoms of thrombosis, Signs/symptoms of bleeding,   Laboratory test error suspected, Change in health, Change in alcohol use,   Change in activity, Upcoming invasive procedure, Emergency department   visit, Upcoming dental procedure, Missed doses, Extra doses, Change in   medications, Change in diet/appetite, Hospital admission, Bruising, Other   complaints      Clinical Outcomes     Negatives:   Major bleeding event, Thromboembolic event,   Anticoagulation-related hospital admission, Anticoagulation-related ED   visit, Anticoagulation-related fatality        INR History:  Anticoagulation Monitoring 2019   INR 1.8 2.8 2.8   INR Date 2019   INR Goal 2.0-3.0 2.0-3.0 2.0-3.0   Trend Same Same Same   Last Week Total 46 mg 46 mg 46 mg   Next Week Total 48 mg 46 mg 46 mg   Sun 6 mg 6 mg 6 mg   Mon 8 mg 8 mg 8 mg   Tue 6 mg 6 mg 6 mg   Wed 8 mg (); Otherwise 6 mg 6 mg 6 mg   Thu 8 mg 8 mg 8 mg   Fri 6 mg 6 mg 6 mg   Sat 6 mg 6 mg 6 mg   Visit Report - - -   Some recent data might be hidden       Plan:  1. INR is  Therapeutic today- see above in Anticoagulation Summary.  Will instruct Carlito Mo to Continue their warfarin regimen- see above in Anticoagulation Summary.  2. Follow up in 4 weeks  3. Patient desires warfarin refills with updated warfarin instructions.  4. Verbal and written information provided. Patient expresses understanding and has no further questions at this time.    Gregory Mei [facility]

## 2019-10-07 ENCOUNTER — ANTICOAGULATION VISIT (OUTPATIENT)
Dept: PHARMACY | Facility: HOSPITAL | Age: 64
End: 2019-10-07

## 2019-10-07 DIAGNOSIS — I63.9 RECURRENT STROKES (HCC): ICD-10-CM

## 2019-10-07 LAB
INR PPP: 3.5 (ref 0.91–1.09)
PROTHROMBIN TIME: 41.4 SECONDS (ref 10–13.8)

## 2019-10-07 PROCEDURE — 85610 PROTHROMBIN TIME: CPT

## 2019-10-07 PROCEDURE — G0463 HOSPITAL OUTPT CLINIC VISIT: HCPCS

## 2019-10-07 PROCEDURE — 36416 COLLJ CAPILLARY BLOOD SPEC: CPT

## 2019-10-07 NOTE — PROGRESS NOTES
Anticoagulation Clinic Progress Note    Anticoagulation Summary  As of 10/7/2019    INR goal:   2.0-3.0   TTR:   48.5 % (7.9 mo)   INR used for dosing:   3.5! (10/7/2019)   Warfarin maintenance plan:   8 mg every Mon, Thu; 6 mg all other days   Weekly warfarin total:   46 mg   Plan last modified:   Gregory Mei RPH (6/3/2019)   Next INR check:   10/21/2019   Priority:   High   Target end date:       Indications    Recurrent strokes (CMS/HCC) [I63.9]             Anticoagulation Episode Summary     INR check location:       Preferred lab:       Send INR reminders to:    [facility]    Comments:         Anticoagulation Care Providers     Provider Role Specialty Phone number    Amber Murguia MD Referring Cardiology [phone]          Clinic Interview:  Patient Findings     Positives:   Change in health    Negatives:   Signs/symptoms of thrombosis, Signs/symptoms of bleeding,   Laboratory test error suspected, Change in alcohol use, Change in   activity, Upcoming invasive procedure, Emergency department visit,   Upcoming dental procedure, Missed doses, Extra doses, Change in   medications, Change in diet/appetite, Hospital admission, Bruising, Other   complaints    Comments:   Patient reports swelling in legs for the past 1-2 weeks.      Clinical Outcomes     Negatives:   Major bleeding event, Thromboembolic event,   Anticoagulation-related hospital admission, Anticoagulation-related ED   visit, Anticoagulation-related fatality    Comments:   Patient reports swelling in legs for the past 1-2 weeks.        INR History:  Anticoagulation Monitoring 7/31/2019 8/30/2019 10/7/2019   INR 2.8 2.8 3.5   INR Date 7/31/2019 8/30/2019 10/7/2019   INR Goal 2.0-3.0 2.0-3.0 2.0-3.0   Trend Same Same Same   Last Week Total 46 mg 46 mg 46 mg   Next Week Total 46 mg 46 mg 42 mg   Sun 6 mg 6 mg 6 mg   Mon 8 mg 8 mg 4 mg (10/7); Otherwise 8 mg   Tue 6 mg 6 mg 6 mg   Wed 6 mg 6 mg 6 mg   Thu 8 mg 8 mg 8 mg   Fri 6 mg  6 mg 6 mg   Sat 6 mg 6 mg 6 mg   Visit Report - - -   Some recent data might be hidden       Plan:  1. INR is Supratherapeutic today- see above in Anticoagulation Summary.  Will instruct Carlito JANINE Mo to Change their warfarin regimen- see above in Anticoagulation Summary.  2. Follow up in 2 weeks  3. Patient declines warfarin refills.  4. Verbal and written information provided. Patient expresses understanding and has no further questions at this time.    Mitali Francis, Pharmacy Intern

## 2019-10-07 NOTE — PROGRESS NOTES
I have supervised and reviewed the notes, assessments, and/or procedures performed by Mitali Francis, PharmD Candidate. The documented assessment and plan were developed cooperatively, and the plan was implemented in my presence. I concur with her documentation of this patient.    Tavares Ortega, IvonD

## 2019-10-21 ENCOUNTER — ANTICOAGULATION VISIT (OUTPATIENT)
Dept: PHARMACY | Facility: HOSPITAL | Age: 64
End: 2019-10-21

## 2019-10-21 DIAGNOSIS — I63.9 RECURRENT STROKES (HCC): ICD-10-CM

## 2019-10-21 LAB
INR PPP: 2.5 (ref 0.91–1.09)
PROTHROMBIN TIME: 30.1 SECONDS (ref 10–13.8)

## 2019-10-21 PROCEDURE — 36416 COLLJ CAPILLARY BLOOD SPEC: CPT

## 2019-10-21 PROCEDURE — 85610 PROTHROMBIN TIME: CPT

## 2019-10-21 RX ORDER — WARFARIN SODIUM 2 MG/1
TABLET ORAL
Qty: 300 TABLET | Refills: 0 | Status: SHIPPED | OUTPATIENT
Start: 2019-10-21 | End: 2019-11-18 | Stop reason: SDUPTHER

## 2019-10-21 NOTE — PROGRESS NOTES
Anticoagulation Clinic Progress Note    Anticoagulation Summary  As of 10/21/2019    INR goal:   2.0-3.0   TTR:   48.6 % (8.4 mo)   INR used for dosin.5 (10/21/2019)   Warfarin maintenance plan:   8 mg every Mon, Thu; 6 mg all other days   Weekly warfarin total:   46 mg   No change documented:   Victorina Perry   Plan last modified:   Gregory Mei [facility] (6/3/2019)   Next INR check:   2019   Priority:   High   Target end date:       Indications    Recurrent strokes (CMS/HCC) [I63.9]             Anticoagulation Episode Summary     INR check location:       Preferred lab:       Send INR reminders to:   [facility] CLINICAL POOL    Comments:         Anticoagulation Care Providers     Provider Role Specialty Phone number    Amber Murguia MD Referring Cardiology [phone]          Clinic Interview:      INR History:  Anticoagulation Monitoring 2019 10/7/2019 10/21/2019   INR 2.8 3.5 2.5   INR Date 2019 10/7/2019 10/21/2019   INR Goal 2.0-3.0 2.0-3.0 2.0-3.0   Trend Same Same Same   Last Week Total 46 mg 46 mg 46 mg   Next Week Total 46 mg 42 mg 46 mg   Sun 6 mg 6 mg 6 mg   Mon 8 mg 4 mg (10/7); Otherwise 8 mg 8 mg   Tue 6 mg 6 mg 6 mg   Wed 6 mg 6 mg 6 mg   Thu 8 mg 8 mg 8 mg   Fri 6 mg 6 mg 6 mg   Sat 6 mg 6 mg 6 mg   Visit Report - - -   Some recent data might be hidden       Plan:  1. INR is therapeutic today- see above in Anticoagulation Summary.   Will instruct Carlito MEHTA Chepe to continue their warfarin regimen- see above in Anticoagulation Summary.  2. Follow up in 4 weeks.  3. Patient declines warfarin refills.  4. Verbal and written information provided. Patient expresses understanding and has no further questions at this time.    Victorina Perry

## 2019-11-13 ENCOUNTER — TELEPHONE (OUTPATIENT)
Dept: CARDIOLOGY | Facility: CLINIC | Age: 64
End: 2019-11-13

## 2019-11-13 RX ORDER — LISINOPRIL 20 MG/1
20 TABLET ORAL DAILY
Qty: 30 TABLET | Refills: 11 | Status: SHIPPED | OUTPATIENT
Start: 2019-11-13 | End: 2020-04-07 | Stop reason: SDUPTHER

## 2019-11-13 NOTE — TELEPHONE ENCOUNTER
"11/13/19  Katarina with [facility] called regarding pt's Rx for Losartan - all \"sartans\" are on backorder - she is asking if a change to Lisinopril would be acceptable.  Ph [phone]./shine  "

## 2019-11-13 NOTE — TELEPHONE ENCOUNTER
I placed an order for lisinopril 20 mg a day.  Let him know that if he starts to get a cough to let me know it could be from the lisinopril.

## 2019-11-14 NOTE — TELEPHONE ENCOUNTER
11/14/19  I called pt - got [facility] that said mail box is full and cannot accept messages.  - Maya please try to call him tomorrow/shine

## 2019-11-18 ENCOUNTER — ANTICOAGULATION VISIT (OUTPATIENT)
Dept: PHARMACY | Facility: HOSPITAL | Age: 64
End: 2019-11-18

## 2019-11-18 DIAGNOSIS — I63.9 RECURRENT STROKES (HCC): ICD-10-CM

## 2019-11-18 LAB
INR PPP: 2.6 (ref 0.91–1.09)
PROTHROMBIN TIME: 31.5 SECONDS (ref 10–13.8)

## 2019-11-18 PROCEDURE — 85610 PROTHROMBIN TIME: CPT

## 2019-11-18 PROCEDURE — 36416 COLLJ CAPILLARY BLOOD SPEC: CPT

## 2019-11-18 RX ORDER — WARFARIN SODIUM 2 MG/1
TABLET ORAL
Qty: 300 TABLET | Refills: 1 | Status: SHIPPED | OUTPATIENT
Start: 2019-11-18 | End: 2020-06-04 | Stop reason: HOSPADM

## 2019-11-18 NOTE — PROGRESS NOTES
Anticoagulation Clinic Progress Note    Anticoagulation Summary  As of 2019    INR goal:   2.0-3.0   TTR:   53.8 % (9.3 mo)   INR used for dosin.6 (2019)   Warfarin maintenance plan:   8 mg every Mon, Thu; 6 mg all other days   Weekly warfarin total:   46 mg   No change documented:   Victorina Perry   Plan last modified:   Gregory Mei RP (6/3/2019)   Next INR check:   2019   Priority:   High   Target end date:       Indications    Recurrent strokes (CMS/HCC) [I63.9]             Anticoagulation Episode Summary     INR check location:       Preferred lab:       [facility]    Comments:         Anticoagulation Care Providers     Provider Role Specialty Phone number    Amber Murguia MD Referring Cardiology [phone]          Clinic Interview:  Patient Findings     Negatives:   Signs/symptoms of thrombosis, Signs/symptoms of bleeding,   Laboratory test error suspected, Change in health, Change in alcohol use,   Change in activity, Upcoming invasive procedure, Emergency department   visit, Upcoming dental procedure, Missed doses, Extra doses, Change in   medications, Change in diet/appetite, Hospital admission, Bruising, Other   complaints      Clinical Outcomes     Negatives:   Major bleeding event, Thromboembolic event,   Anticoagulation-related hospital admission, Anticoagulation-related ED   visit, Anticoagulation-related fatality        INR History:  Anticoagulation Monitoring 10/7/2019 10/21/2019 2019   INR 3.5 2.5 2.6   INR Date 10/7/2019 10/21/2019 2019   INR Goal 2.0-3.0 2.0-3.0 2.0-3.0   Trend Same Same Same   Last Week Total 46 mg 46 mg 46 mg   Next Week Total 42 mg 46 mg 46 mg   Sun 6 mg 6 mg 6 mg   Mon 4 mg (10/7); Otherwise 8 mg 8 mg 8 mg   Tue 6 mg 6 mg 6 mg   Wed 6 mg 6 mg 6 mg   Thu 8 mg 8 mg 8 mg   Fri 6 mg 6 mg 6 mg   Sat 6 mg 6 mg 6 mg   Visit Report - - -   Some recent data might be hidden       Plan:  1.  INR is therapeutic today- see above in Anticoagulation Summary.   Will instruct Carlito Mo to continue their warfarin regimen- see above in Anticoagulation Summary.  2. Follow up in 4 weeks.  3. Patient desires warfarin refills.  4. Verbal and written information provided. Patient expresses understanding and has no further questions at this time.    Victorina Perry

## 2019-11-22 ENCOUNTER — TELEPHONE (OUTPATIENT)
Dept: CARDIOLOGY | Facility: CLINIC | Age: 64
End: 2019-11-22

## 2019-11-22 NOTE — TELEPHONE ENCOUNTER
requesting cardiac clearance for inguinal hernia repair.     Assessment/Plan:      Assessment       1.  Acute on chronic systolic and diastolic heart failure.  On oral Lasix.  BUN and creatinine stable.  He appears euvolemic.  Heart Failure  Subjective/Objective  • The patient reports dyspnea    • Physical exam findings negative for elevated JVP.        2.  History of stroke.  Transesophageal echocardiogram did not show cardiac source of embolus.  Zio patch did not show atrial fibrillation.  I reviewed the notes from his neurologist which stated he has had further strokes on aspirin and Plavix and they recommended anticoagulation.  Given chronic kidney disease, I recommend warfarin.  I referred him to the anticoagulation clinic for INR monitoring.     3.  History of coronary artery disease with remote history of bypass surgery in 2001.  Coronary Artery Disease  Assessment  • The patient has no angina     Plan  • Lifestyle modifications discussed include adhering to a heart healthy diet, regular exercise and regular monitoring of cholesterol and blood pressure     Subjective - Objective  • Current antiplatelet therapy includes aspirin 81 mg      3.  Hypertension.  Controlled.   4.  Hyperlipidemia.  He is supposed to be on atorvastatin but has questionable compliance.  5.  Diabetes  6.  Chronic kidney disease  7.  Fatigue and lack of energy.  8.  Right bundle branch block  9.  Chronically elevated troponin.     6 months with [facility]

## 2019-11-25 NOTE — TELEPHONE ENCOUNTER
The cardiac release paperwork was in her in box in her office. If you could please get that and fax it along with the letter in the Pt's chart for their cardiac clearance please

## 2019-11-26 NOTE — TELEPHONE ENCOUNTER
Then I will fax the letter and the most recent office note from Jovana Caceres. Sending this to you in case  office calls about anything

## 2019-11-26 NOTE — TELEPHONE ENCOUNTER
I looked for it yesterday afternoon and it was not in there. Her inbox was empty except for what I had put in there.

## 2019-12-16 ENCOUNTER — ANTICOAGULATION VISIT (OUTPATIENT)
Dept: PHARMACY | Facility: HOSPITAL | Age: 64
End: 2019-12-16

## 2019-12-16 DIAGNOSIS — I63.9 RECURRENT STROKES (HCC): ICD-10-CM

## 2019-12-16 LAB
INR PPP: 1.2 (ref 0.91–1.09)
PROTHROMBIN TIME: 13.8 SECONDS (ref 10–13.8)

## 2019-12-16 PROCEDURE — 36416 COLLJ CAPILLARY BLOOD SPEC: CPT

## 2019-12-16 PROCEDURE — 85610 PROTHROMBIN TIME: CPT

## 2019-12-16 PROCEDURE — G0463 HOSPITAL OUTPT CLINIC VISIT: HCPCS

## 2019-12-16 NOTE — PROGRESS NOTES
Anticoagulation Clinic Progress Note    Anticoagulation Summary  As of 2019    INR goal:   2.0-3.0   TTR:   52.8 % (10.2 mo)   INR used for dosin.2! (2019)   Warfarin maintenance plan:   8 mg every Mon, Thu; 6 mg all other days   Weekly warfarin total:   46 mg   Plan last modified:   Gregory Mei RPH (6/3/2019)   Next INR check:   2019   Priority:   High   Target end date:       Indications    Recurrent strokes (CMS/HCC) [I63.9]             Anticoagulation Episode Summary     INR check location:       Preferred lab:       Send INR reminders to:    [facility]    Comments:         Anticoagulation Care Providers     Provider Role Specialty Phone number    Amber Murguia MD Referring Cardiology [phone]          Clinic Interview:  Patient Findings     Positives:   Missed doses, Other complaints    Negatives:   Signs/symptoms of thrombosis, Signs/symptoms of bleeding,   Laboratory test error suspected, Change in health, Change in alcohol use,   Change in activity, Upcoming invasive procedure, Emergency department   visit, Upcoming dental procedure, Extra doses, Change in medications,   Change in diet/appetite, Hospital admission, Bruising    Comments:   Held warfarin x 5 days for hernia procedure ; resumed   . Fell  (tripped over cable); no trauma to head.      Clinical Outcomes     Negatives:   Major bleeding event, Thromboembolic event,   Anticoagulation-related hospital admission, Anticoagulation-related ED   visit, Anticoagulation-related fatality    Comments:   Held warfarin x 5 days for hernia procedure ; resumed   . Fell  (tripped over cable); no trauma to head.        INR History:  Anticoagulation Monitoring 10/21/2019 2019 2019   INR 2.5 2.6 1.2   INR Date 10/21/2019 2019 2019   INR Goal 2.0-3.0 2.0-3.0 2.0-3.0   Trend Same Same Same   Last Week Total 46 mg 46 mg 46 mg   Next Week Total 46 mg 46 mg 52 mg   Sun 6  mg 6 mg 6 mg   Mon 8 mg 8 mg 10 mg (12/16)   Tue 6 mg 6 mg 10 mg (12/17)   Wed 6 mg 6 mg 6 mg   Thu 8 mg 8 mg 8 mg   Fri 6 mg 6 mg 6 mg   Sat 6 mg 6 mg 6 mg   Visit Report - - -   Some recent data might be hidden       Plan:  1. INR is Subtherapeutic today- see above in Anticoagulation Summary.  Will instruct Carlito MEHTA Chepe to Change their warfarin regimen- see above in Anticoagulation Summary.  2. Follow up in 1 week  3. Patient declines warfarin refills.  4. Verbal and written information provided. Patient expresses understanding and has no further questions at this time.    Gregory Mei [facility]

## 2019-12-23 ENCOUNTER — ANTICOAGULATION VISIT (OUTPATIENT)
Dept: PHARMACY | Facility: HOSPITAL | Age: 64
End: 2019-12-23

## 2019-12-23 DIAGNOSIS — I63.9 RECURRENT STROKES (HCC): ICD-10-CM

## 2019-12-23 LAB
INR PPP: 2 (ref 0.91–1.09)
PROTHROMBIN TIME: 24.3 SECONDS (ref 10–13.8)

## 2019-12-23 PROCEDURE — 85610 PROTHROMBIN TIME: CPT

## 2019-12-23 PROCEDURE — 36416 COLLJ CAPILLARY BLOOD SPEC: CPT

## 2019-12-23 NOTE — PROGRESS NOTES
I have supervised and reviewed the notes, assessments, and/or procedures performed by our PharmD Candidate. The documented assessment and plan were developed cooperatively. I concur with the documentation of this patient encounter.    Sera La RP

## 2019-12-23 NOTE — PROGRESS NOTES
Anticoagulation Clinic Progress Note    Anticoagulation Summary  As of 2019    INR goal:   2.0-3.0   TTR:   51.6 % (10.5 mo)   INR used for dosin.0 (2019)   Warfarin maintenance plan:   8 mg every Mon, Thu; 6 mg all other days   Weekly warfarin total:   46 mg   No change documented:   Sadia Chawla, Pharmacy Intern   Plan last modified:   Gregory Mei [facility] (6/3/2019)   Next INR check:   2020   Priority:   High   Target end date:       Indications    Recurrent strokes (CMS/HCC) [I63.9]             Anticoagulation Episode Summary     INR check location:       Preferred lab:       Send INR reminders to:    [facility]    Comments:         Anticoagulation Care Providers     Provider Role Specialty Phone number    Amber Murguia MD Referring Cardiology [phone]          Clinic Interview:  Patient Findings     Negatives:   Signs/symptoms of thrombosis, Signs/symptoms of bleeding,   Laboratory test error suspected, Change in health, Change in alcohol use,   Change in activity, Upcoming invasive procedure, Emergency department   visit, Upcoming dental procedure, Missed doses, Extra doses, Change in   medications, Change in diet/appetite, Hospital admission, Bruising, Other   complaints      Clinical Outcomes     Negatives:   Major bleeding event, Thromboembolic event,   Anticoagulation-related hospital admission, Anticoagulation-related ED   visit, Anticoagulation-related fatality        INR History:  Anticoagulation Monitoring 2019   INR 2.6 1.2 2.0   INR Date 2019   INR Goal 2.0-3.0 2.0-3.0 2.0-3.0   Trend Same Same Same   Last Week Total 46 mg 46 mg 52 mg   Next Week Total 46 mg 52 mg 46 mg   Sun 6 mg 6 mg 6 mg   Mon 8 mg 10 mg () 8 mg   Tue 6 mg 10 mg () 6 mg   Wed 6 mg 6 mg 6 mg   Thu 8 mg 8 mg 8 mg   Fri 6 mg 6 mg 6 mg   Sat 6 mg 6 mg 6 mg   Visit Report - - -   Some recent data might be hidden        Plan:  1. INR is Therapeutic today- see above in Anticoagulation Summary.  Will instruct Carlito Mo to Continue their warfarin regimen- see above in Anticoagulation Summary.  2. Follow up in 1 month  3. Patient declines warfarin refills.  4. Verbal and written information provided. Patient expresses understanding and has no further questions at this time.    Sadia Chawla, Pharmacy Intern

## 2020-02-11 ENCOUNTER — TELEPHONE (OUTPATIENT)
Dept: PHARMACY | Facility: HOSPITAL | Age: 65
End: 2020-02-11

## 2020-02-17 ENCOUNTER — ANTICOAGULATION VISIT (OUTPATIENT)
Dept: PHARMACY | Facility: HOSPITAL | Age: 65
End: 2020-02-17

## 2020-02-17 DIAGNOSIS — I63.9 RECURRENT STROKES (HCC): ICD-10-CM

## 2020-02-17 LAB
INR PPP: 2 (ref 0.91–1.09)
PROTHROMBIN TIME: 24.5 SECONDS (ref 10–13.8)

## 2020-02-17 PROCEDURE — 36416 COLLJ CAPILLARY BLOOD SPEC: CPT

## 2020-02-17 PROCEDURE — 85610 PROTHROMBIN TIME: CPT

## 2020-02-17 NOTE — PROGRESS NOTES
"Anticoagulation Clinic Progress Note    Anticoagulation Summary  As of 2020    INR goal:   2.0-3.0   TTR:   58.9 % (1 y)   INR used for dosin.0 (2020)   Warfarin maintenance plan:   8 mg every Mon, Thu; 6 mg all other days   Weekly warfarin total:   46 mg   Plan last modified:   Gregory Mei RPH (6/3/2019)   Next INR check:   3/2/2020   Priority:   High   Target end date:       Indications    Recurrent strokes (CMS/HCC) [I63.9]             Anticoagulation Episode Summary     INR check location:       Preferred lab:       Send INR reminders to:    [facility]    Comments:         Anticoagulation Care Providers     Provider Role Specialty Phone number    Amber Murguia MD Referring Cardiology [phone]          Clinic Interview:  Patient Findings     Positives:   Missed doses, Extra doses, Change in diet/appetite    Negatives:   Signs/symptoms of thrombosis, Signs/symptoms of bleeding,   Laboratory test error suspected, Change in health, Change in alcohol use,   Change in activity, Upcoming invasive procedure, Emergency department   visit, Upcoming dental procedure, Change in medications, Hospital   admission, Bruising, Other complaints    Comments:   Pt reports missing \"at least 2\" doses the last 4 days &   taking an extra 2 mg on 2 days when he had to take 6 mg to make up for his   missed doses. Pt reports having jaspal tea a couple of times and having   dark tarry stools the last few months. Pt was instructed to follow up with   his PCP for dark/tarry stools.       Clinical Outcomes     Negatives:   Major bleeding event, Thromboembolic event,   Anticoagulation-related hospital admission, Anticoagulation-related ED   visit, Anticoagulation-related fatality    Comments:   Pt reports missing \"at least 2\" doses the last 4 days &   taking an extra 2 mg on 2 days when he had to take 6 mg to make up for his   missed doses. Pt reports having jaspal tea a couple of times and having "   dark tarry stools the last few months. Pt was instructed to follow up with   his PCP for dark/tarry stools.         INR History:  Anticoagulation Monitoring 12/16/2019 12/23/2019 2/17/2020   INR 1.2 2.0 2.0   INR Date 12/16/2019 12/23/2019 2/17/2020   INR Goal 2.0-3.0 2.0-3.0 2.0-3.0   Trend Same Same Same   Last Week Total 46 mg 52 mg 36 mg   Next Week Total 52 mg 46 mg 48 mg   Sun 6 mg 6 mg 6 mg   Mon 10 mg (12/16) 8 mg 10 mg (2/17); Otherwise 8 mg   Tue 10 mg (12/17) 6 mg 6 mg   Wed 6 mg 6 mg 6 mg   Thu 8 mg 8 mg 8 mg   Fri 6 mg 6 mg 6 mg   Sat 6 mg 6 mg 6 mg   Visit Report - - -   Some recent data might be hidden       Plan:  1. INR is Therapeutic today- see above in Anticoagulation Summary.  Will instruct Carlito JANINE Mo to Change their warfarin regimen (boost with 10 mg today)- see above in Anticoagulation Summary.  2. Follow up in 2 weeks  3. Patient declines warfarin refills.  4. Verbal and written information provided. Patient expresses understanding and has no further questions at this time.    Zohaib Francis, Pharmacy Intern

## 2020-02-27 ENCOUNTER — CONSULT (OUTPATIENT)
Dept: ONCOLOGY | Facility: CLINIC | Age: 65
End: 2020-02-27

## 2020-02-27 ENCOUNTER — LAB (OUTPATIENT)
Dept: OTHER | Facility: HOSPITAL | Age: 65
End: 2020-02-27

## 2020-02-27 VITALS
TEMPERATURE: 97.7 F | OXYGEN SATURATION: 97 % | WEIGHT: 202.5 LBS | SYSTOLIC BLOOD PRESSURE: 164 MMHG | DIASTOLIC BLOOD PRESSURE: 82 MMHG | BODY MASS INDEX: 28.35 KG/M2 | HEART RATE: 73 BPM | RESPIRATION RATE: 16 BRPM | HEIGHT: 71 IN

## 2020-02-27 DIAGNOSIS — D50.9 IRON DEFICIENCY ANEMIA, UNSPECIFIED IRON DEFICIENCY ANEMIA TYPE: Primary | ICD-10-CM

## 2020-02-27 DIAGNOSIS — N18.30 ANEMIA, CHRONIC RENAL FAILURE, STAGE 3 (MODERATE) (HCC): ICD-10-CM

## 2020-02-27 DIAGNOSIS — D64.9 CHRONIC ANEMIA: Primary | ICD-10-CM

## 2020-02-27 DIAGNOSIS — D63.1 ANEMIA, CHRONIC RENAL FAILURE, STAGE 3 (MODERATE) (HCC): ICD-10-CM

## 2020-02-27 LAB
BASOPHILS # BLD AUTO: 0.01 10*3/MM3 (ref 0–0.2)
BASOPHILS NFR BLD AUTO: 0.2 % (ref 0–1.5)
DEPRECATED RDW RBC AUTO: 41.1 FL (ref 37–54)
EOSINOPHIL # BLD AUTO: 0.33 10*3/MM3 (ref 0–0.4)
EOSINOPHIL NFR BLD AUTO: 5.1 % (ref 0.3–6.2)
ERYTHROCYTE [DISTWIDTH] IN BLOOD BY AUTOMATED COUNT: 14 % (ref 12.3–15.4)
FERRITIN SERPL-MCNC: 74.1 NG/ML (ref 30–400)
HCT VFR BLD AUTO: 30.3 % (ref 37.5–51)
HGB BLD-MCNC: 9.8 G/DL (ref 13–17.7)
IMM GRANULOCYTES # BLD AUTO: 0.04 10*3/MM3 (ref 0–0.05)
IMM GRANULOCYTES NFR BLD AUTO: 0.6 % (ref 0–0.5)
IRON 24H UR-MRATE: 36 MCG/DL (ref 59–158)
IRON SATN MFR SERPL: 11 % (ref 20–50)
LYMPHOCYTES # BLD AUTO: 1.15 10*3/MM3 (ref 0.7–3.1)
LYMPHOCYTES NFR BLD AUTO: 17.6 % (ref 19.6–45.3)
MCH RBC QN AUTO: 25.7 PG (ref 26.6–33)
MCHC RBC AUTO-ENTMCNC: 32.3 G/DL (ref 31.5–35.7)
MCV RBC AUTO: 79.5 FL (ref 79–97)
MONOCYTES # BLD AUTO: 0.57 10*3/MM3 (ref 0.1–0.9)
MONOCYTES NFR BLD AUTO: 8.7 % (ref 5–12)
NEUTROPHILS # BLD AUTO: 4.42 10*3/MM3 (ref 1.7–7)
NEUTROPHILS NFR BLD AUTO: 67.8 % (ref 42.7–76)
NRBC BLD AUTO-RTO: 0 /100 WBC (ref 0–0.2)
PLATELET # BLD AUTO: 231 10*3/MM3 (ref 140–450)
PMV BLD AUTO: 10.3 FL (ref 6–12)
RBC # BLD AUTO: 3.81 10*6/MM3 (ref 4.14–5.8)
TIBC SERPL-MCNC: 335 MCG/DL (ref 298–536)
TRANSFERRIN SERPL-MCNC: 225 MG/DL (ref 200–360)
WBC NRBC COR # BLD: 6.52 10*3/MM3 (ref 3.4–10.8)

## 2020-02-27 PROCEDURE — 99205 OFFICE O/P NEW HI 60 MIN: CPT | Performed by: INTERNAL MEDICINE

## 2020-02-27 PROCEDURE — 82728 ASSAY OF FERRITIN: CPT | Performed by: INTERNAL MEDICINE

## 2020-02-27 PROCEDURE — 36415 COLL VENOUS BLD VENIPUNCTURE: CPT

## 2020-02-27 PROCEDURE — 83540 ASSAY OF IRON: CPT | Performed by: INTERNAL MEDICINE

## 2020-02-27 PROCEDURE — 84466 ASSAY OF TRANSFERRIN: CPT | Performed by: INTERNAL MEDICINE

## 2020-02-27 PROCEDURE — 85025 COMPLETE CBC W/AUTO DIFF WBC: CPT | Performed by: INTERNAL MEDICINE

## 2020-02-27 RX ORDER — FOLIC ACID 1 MG/1
1 TABLET ORAL DAILY
Qty: 90 TABLET | Refills: 3 | Status: SHIPPED | OUTPATIENT
Start: 2020-02-27 | End: 2021-02-09

## 2020-02-27 RX ORDER — DIPHENHYDRAMINE HYDROCHLORIDE 50 MG/ML
50 INJECTION INTRAMUSCULAR; INTRAVENOUS AS NEEDED
Status: CANCELLED | OUTPATIENT
Start: 2020-03-03

## 2020-02-27 RX ORDER — SODIUM CHLORIDE 9 MG/ML
250 INJECTION, SOLUTION INTRAVENOUS ONCE
Status: CANCELLED | OUTPATIENT
Start: 2020-03-03

## 2020-02-27 RX ORDER — METHYLPREDNISOLONE SODIUM SUCCINATE 125 MG/2ML
125 INJECTION, POWDER, LYOPHILIZED, FOR SOLUTION INTRAMUSCULAR; INTRAVENOUS AS NEEDED
Status: CANCELLED | OUTPATIENT
Start: 2020-03-03

## 2020-02-27 RX ORDER — LANOLIN ALCOHOL/MO/W.PET/CERES
1000 CREAM (GRAM) TOPICAL DAILY
Qty: 90 TABLET | Refills: 3 | Status: SHIPPED | OUTPATIENT
Start: 2020-02-27 | End: 2021-02-09

## 2020-02-27 NOTE — PROGRESS NOTES
REASON FOR CONSULTATION:     Provide an opinion on any further workup or treatment for chronic anemia                             REQUESTING PHYSICIAN: Jerome Diallo MD     RECORDS OBTAINED:  Records of the patients history including those obtained from the referring provider were reviewed and summarized in detail.    HISTORY OF PRESENT ILLNESS:  The patient is a 64 y.o. male with history of hypertension, congestive heart failure, diabetes who presents today for initial evaluation of chronic anemia.  The patient presents by himself today.    He notes that he was officially diagnosed with anemia about 6 months ago.  He was started on oral ferrous sulfate 325 mg twice a day at that time, but this was increased to three times a day about a month ago.  He reports having very dark and solid stools and some constipation since he started taking the oral iron.  He reports taking a stool softener only as needed for his constipation.     He is not taking any vitamin supplements at this time.      Today, he reports feeling very exhausted, especially with taking care of his son with Autism.  He also reports difficulty sleeping at night due to this.  He also reports occasional mediastinal chest pain, but he denies any abdominal pain.    The patient is anticoagulated on Coumadin due to his history of recurrent strokes.  He denies any abnormal bleeding, such as melena or hematochezia.  He reports he has had chronic leg swelling since he had coronary bypass surgery about 9 years ago.  However, he denies history of a prior DVT/PE.     He has history of stage III CKD, and his nephrologist is Dr. Nima Huddleston.    He also has history of diabetes mellitus, and his glucose level is typically around the 150's.    The patient denies any history of alcohol or tobacco use.  He lives at home with both of his sons, and he reports that his son with Autism smokes cigarettes.  He reports his other son is on dialysis.    Per our  records, the patient has had persistent anemia since August 2015.  I have reviewed laboratory studies from 8/20/2015, he had hemoglobin 12.5.  His hemoglobin electrodes over time, and most recently has been less than 10 g since 11/20/2019.  He is laboratory study on 10/8/2018 reported ferritin 43.3 ng/mL, free iron 45 TIBC 295 and iron saturation 15%.  His vitamin B12 level was 495 pg/mL.  His hemoglobin was 11.6 MCV 77.9 MCHC 31.9, with WBC 6100 and platelets 384,000 10/2/2018.     On 7/4/2019, had a repeat laboratory study reporting iron saturation 7%, free iron 23 and a TIBC 349, without a ferritin level.  Serum folate was 9.2 ng/mL and vitamin B12 at 416 pg/mL.  His hemoglobin today was 10.5, MCV 78.9 in the normal platelets 293,000 WBC 7900.  Patient has stage III chronic and will insufficiency with most recent baseline creatinine 1.8 on 1/16/2020.    Laboratory studies today, 2/27/2020, show anemia hemoglobin 9.8 MCV 79.5, MCHC 32.3, platelets 231,000 and the WBC 6520.    Past Medical History:   Diagnosis Date   • Acquired hypothyroidism    • Acute congestive heart failure (CMS/HCC)    • Acute respiratory failure with hypoxia (CMS/HCC)    • Acute sinusitis    • SYLVAIN (acute kidney injury) (CMS/[facility])     on CKD   • Anemia    • Arthritis    • CAD (coronary artery disease)    • Cancer (CMS/HCC)     skin   • Chronic combined systolic and diastolic congestive heart failure (CMS/HCC)    • Chronic ischemic heart disease    • CKD (chronic kidney disease)    • COPD (chronic obstructive pulmonary disease) (CMS/HCC)    • Depression    • Diabetes mellitus type 2, uncontrolled, without complications (CMS/HCC)    • Disease of thyroid gland    • Fatigue    • Frequent PVCs    • Heart attack (CMS/HCC)    • History of coronary artery disease     with remote history of bypass surgery in 2001   • History of transfusion    • Hyperglycemia    • Hyperlipidemia    • Hypertension    • Hypertensive encephalopathy    • Mental status change      Acute   • YAW on CPAP    • Pleural effusion    • Pneumonia    • RBBB (right bundle branch block)    • Screening for prostate cancer 2011   • Stroke (CMS/HCC)    • TIA (transient ischemic attack)    • Type 2 diabetes mellitus (CMS/HCC)    • Urinary retention    • Vitamin D deficiency      Past Surgical History:   Procedure Laterality Date   • APPENDECTOMY N/A 02/14/2016    Dr. Alexey Dodson   • COLONOSCOPY      over 20 years ago.  no polyps    • COLONOSCOPY N/A 9/18/2018    Procedure: COLONOSCOPY;  Surgeon: Jose Enrique Louie MD;  Location: [facility] ENDOSCOPY;  Service: Gastroenterology   • COLONOSCOPY N/A 9/19/2018    IH, EH, polyps (TAs w/low grade dysplasia)   • CORONARY ARTERY BYPASS GRAFT  11/2001   • HERNIA REPAIR Left 12/05/2019   • TONSILLECTOMY     • VASECTOMY       ONCOLOGIC/HEMATOLOGIC HISTORY: (History from previous dates can be found in the separate document.)   See HPI.    MEDICATIONS    Current Outpatient Medications:   •  amLODIPine (NORVASC) 5 MG tablet, Take 5 mg by mouth Daily., Disp: , Rfl:   •  aspirin 81 MG EC tablet, Take 81 mg by mouth Every Morning., Disp: , Rfl:   •  atorvastatin (LIPITOR) 40 MG tablet, Take 40 mg by mouth Every Night., Disp: , Rfl:   •  buPROPion XL (WELLBUTRIN XL) 150 MG 24 hr tablet, Take 150 mg by mouth Daily., Disp: , Rfl:   •  Cholecalciferol (VITAMIN D3) 5000 units capsule capsule, Take 5,000 Units by mouth Daily., Disp: , Rfl:   •  ferrous sulfate 325 (65 FE) MG tablet, Take 325 mg by mouth Daily With Breakfast., Disp: , Rfl:   •  furosemide (LASIX) 40 MG tablet, Take 1 tablet by mouth Daily., Disp: , Rfl:   •  Insulin Glargine (BASAGLAR KWIKPEN) 100 UNIT/ML injection pen, Inject 8 Units under the skin into the appropriate area as directed Every Night. Patient says he does not take consistently, Disp: , Rfl:   •  insulin glulisine (APIDRA) 100 UNIT/ML injection, Inject 2 Units under the skin into the appropriate area as directed 3 (Three) Times a Day Before  Meals. Patient says he does not take consistently, Disp: , Rfl:   •  levothyroxine (SYNTHROID, LEVOTHROID) 75 MCG tablet, Take 75 mcg by mouth Daily., Disp: , Rfl:   •  lisinopril (PRINIVIL,ZESTRIL) 20 MG tablet, Take 1 tablet by mouth Daily., Disp: 30 tablet, Rfl: 11  •  metoprolol succinate XL (TOPROL-XL) 25 MG 24 hr tablet, Take 1 tablet by mouth Daily., Disp: 30 tablet, Rfl: 11  •  tamsulosin (FLOMAX) 0.4 MG capsule 24 hr capsule, Take 1 capsule by mouth Every Night., Disp: , Rfl:   •  warfarin (COUMADIN) 2 MG tablet, Take 4 tablets (8 mg) by mouth every Mon and Thurs, and take 3 tablets (6 mg) by mouth all other days or as directed., Disp: 300 tablet, Rfl: 1  •  folic acid (FOLVITE) 1 MG tablet, Take 1 tablet by mouth Daily., Disp: 90 tablet, Rfl: 3  •  vitamin B-12 (CYANOCOBALAMIN) 1000 MCG tablet, Take 1 tablet by mouth Daily., Disp: 90 tablet, Rfl: 3    ALLERGIES:     Allergies   Allergen Reactions   • Fluoxetine Unknown - High Severity   • Prozac [Fluoxetine Hcl] Other (See Comments)     shaking       SOCIAL HISTORY:       Social History     Socioeconomic History   • Marital status:      Spouse name: Lissette   • Number of children: Not on file   • Years of education: Not on file   • Highest education level: Not on file   Tobacco Use   • Smoking status: Never Smoker   • Smokeless tobacco: Never Used   • Tobacco comment: daily caffeine   Substance and Sexual Activity   • Alcohol use: No   • Drug use: No   • Sexual activity: Defer   College education, retired  and .      FAMILY HISTORY:   Father  of lung cancer at age 65.  Mother  of accident from a fall at age of 93.  Brother was diagnosed of liver cancer at age 45, passed away at about age of 50, he also had liver cirrhosis.  Patient has 3 children, one his kidney failure on dialysis with diabetes.  Another son he has autism.  Eldest child is in good health condition.    Family History   Problem Relation Age of  "Onset   • Diabetes Mother    • Hypertension Mother    • Macular degeneration Mother    • Alcohol abuse Father    • Cancer Father         lung   • Heart disease Father    • Alcohol abuse Brother        REVIEW OF SYSTEMS:  Review of Systems   Constitutional: Positive for diaphoresis (Some night sweats), fatigue and unexpected weight change (Weight gain). Negative for chills and fever.   HENT: Positive for sore throat. Negative for hearing loss, nosebleeds and tinnitus.    Eyes: Positive for visual disturbance (Poor vision). Negative for pain.   Respiratory: Positive for shortness of breath (Exertional dyspnea). Negative for cough and wheezing.    Cardiovascular: Positive for chest pain (occasional) and leg swelling (chronic). Negative for palpitations.   Gastrointestinal: Positive for constipation (occasional). Negative for abdominal pain, blood in stool, diarrhea, nausea and vomiting.        Very dark stools with taking oral iron   Endocrine: Positive for cold intolerance. Negative for heat intolerance, polydipsia and polyuria.   Genitourinary: Positive for frequency. Negative for difficulty urinating, dysuria and hematuria.   Musculoskeletal: Positive for joint swelling (Ankle swelling). Negative for arthralgias and myalgias.   Skin: Negative for rash and wound.        Pruritus without skin rashes.   Allergic/Immunologic: Negative for environmental allergies.   Neurological: Positive for dizziness. Negative for syncope and numbness.   Hematological: Does not bruise/bleed easily.   Psychiatric/Behavioral: Positive for sleep disturbance (difficulty sleeping). Negative for dysphoric mood. The patient is not nervous/anxious.               Vitals:    02/27/20 1306   BP: 164/82   Pulse: 73   Resp: 16   Temp: 97.7 °F (36.5 °C)   SpO2: 97%   Weight: 91.9 kg (202 lb 8 oz)   Height: 180 cm (70.87\")   PainSc:   3   PainLoc: Comment: chest area     Current Status 2/27/2020   ECOG score 0       PHYSICAL EXAM:    CONSTITUTIONAL:  " Vital signs reviewed.  No distress, looks comfortable.  EYES:  Conjunctiva and lids unremarkable.  PERRLA  EARS,NOSE,MOUTH,THROAT:  Ears and nose appear unremarkable.  Lips, teeth, gums appear unremarkable.  RESPIRATORY:  Normal respiratory effort.  Lungs clear to auscultation bilaterally.  CARDIOVASCULAR:  Normal S1, S2.  No murmurs rubs or gallops.  2+ edema in the left leg, 1+ edema in the right leg.  GASTROINTESTINAL: Abdomen appears unremarkable.  Nontender.  No hepatomegaly.  No splenomegaly.  LYMPHATIC:  No cervical, supraclavicular, axillary lymphadenopathy.  MUSCULOSKELETAL:  Unremarkable gait and station.  Unremarkable digits/nails.  No cyanosis or clubbing.   SKIN:  Warm.  No rashes.  PSYCHIATRIC:  Normal judgment and insight.  Normal mood and affect.    RECENT LABS:    Lab Results   Component Value Date    NEUTROABS 4.42 02/27/2020      Lab Results   Component Value Date    WBC 6.52 02/27/2020    HGB 9.8 (L) 02/27/2020    HCT 30.3 (L) 02/27/2020    MCV 79.5 02/27/2020     02/27/2020     Lab Results   Component Value Date    GLUCOSE 100 (H) 07/06/2019    BUN 26 (H) 01/16/2020    CREATININE 1.8 (H) 01/16/2020    EGFRIFNONA 35 (L) 07/06/2019    EGFRIFAFRI 74 12/12/2017    BCR 14.4 01/16/2020    K 4.5 01/16/2020    CO2 26 01/16/2020    CALCIUM 9.1 01/16/2020    PROTENTOTREF 6.7 12/12/2017    ALBUMIN 4.0 11/20/2019    LABIL2 1.5 11/20/2019    AST 20 11/20/2019    ALT 18 11/20/2019        Lab Results   Component Value Date    IRON 61 11/20/2019    TIBC 248 (L) 11/20/2019    FERRITIN 43.3 10/08/2018        Assessment/Plan   Iron deficiency anemia, unspecified iron deficiency anemia type  - Ferritin  - Iron Profile    Anemia, chronic renal failure, stage 3 (moderate) (CMS/HCC)    Carlito Mo     ASSESSMENT:  1.  Chronic anemia with evidence of iron deficiency.  Per our records, the patient has had persistent anemia since August 2015.    · I have reviewed laboratory studies for the past 4 years, with  documented iron deficiency.    · He was started on oral ferrous sulfate 325 mg twice daily about 6 months ago, but this was increased to three times a day about a month ago.  He reports very dark stools and occasional constipation with taking the oral iron.  · Laboratory studies today, 2/27/2020, show persistent anemia.  We will also check his iron studies today.  This patient likely has anemia with multiple factorial including iron deficiency, and the stage III renal insufficiency more other things.  He is a candidate for HAI such as Procrit injection, however his iron deficiency may be fixed first.  · I discussed with the patient today that I recommend giving him IV Injectafer.  We will arrange for 2 doses of weekly IV Injectafer, starting next week.  We will also start him on Procrit injections in 4 weeks, repeating every 2 weeks.  · He is not taking any vitamin supplements at this time.  I recommend starting the patient on oral folic acid and vitamin B12 supplementation.  I will prescribe the patient oral folic acid 1 mg daily and vitamin B12 1000 mcg daily.  I E-scribed the prescriptions to the patient's pharmacy today, for a 1-year supply.   · He may continue on the oral iron three times a day.   · I will follow up with him in 6 weeks for re-evaluation with labs CBC, iron profile, and ferritin.    2.  History of CKD, stage III.  Followed by nephrology, Dr. Nima Huddleston.      PLAN:  1. We will check his iron profile and ferritin level today.  We will arrange for 2 doses of weekly IV Injectafer, starting next week.    2. We will also start him on Procrit injections in 4 weeks, repeating every 2 weeks.  3. Start oral folic acid 1 mg daily and vitamin B12 at 1000 mcg daily.  I E-scribed the prescriptions to the patient's pharmacy today, for a 1-year supply.   4. He may continue on oral ferrous sulfate 325 mg three times a day.   5. Continue anticoagulation with Coumadin.  6. Continue taking stool softener only as  needed for his constipation.  7. Continue follow up with Dr. Nima Huddleston for his CKD.  8. Follow up with me in 6 weeks with labs CBC, iron profile, and ferritin.    By signing my name here, I Luis Franklin, attest that all documentation on 02/27/20 at 2:10 PM has been prepared under the direction and in the presence of Dr. Boogie Hoyos MD.    I reviewed the note scribed by Luis Franklin and made appropriate corrections.     Addendum:  Lab Results   Component Value Date    IRON 36 (L) 02/27/2020    TIBC 335 02/27/2020    FERRITIN 74.10 02/27/2020     Iron saturation 11%     This patient has iron deficiency in the background of stage III chronic renal insufficiency.  He qualifies for intravenous iron therapy.  We will treat him as planned.        BOOGIE HOYOS M.D., Ph.D.    2/27/2020          CC: Jyoti Reynolds M.D.      Florencia Caballero M.D.   Amber Murguia M.D.   Jerome Diallo M.D.   Nima Huddleston M.D.

## 2020-03-05 ENCOUNTER — INFUSION (OUTPATIENT)
Dept: ONCOLOGY | Facility: HOSPITAL | Age: 65
End: 2020-03-05

## 2020-03-05 VITALS
TEMPERATURE: 98.1 F | BODY MASS INDEX: 28.7 KG/M2 | WEIGHT: 205 LBS | SYSTOLIC BLOOD PRESSURE: 158 MMHG | OXYGEN SATURATION: 88 % | HEART RATE: 62 BPM | DIASTOLIC BLOOD PRESSURE: 81 MMHG

## 2020-03-05 DIAGNOSIS — N18.30 ANEMIA, CHRONIC RENAL FAILURE, STAGE 3 (MODERATE) (HCC): Primary | ICD-10-CM

## 2020-03-05 DIAGNOSIS — D63.1 ANEMIA, CHRONIC RENAL FAILURE, STAGE 3 (MODERATE) (HCC): Primary | ICD-10-CM

## 2020-03-05 DIAGNOSIS — D50.9 IRON DEFICIENCY ANEMIA, UNSPECIFIED IRON DEFICIENCY ANEMIA TYPE: ICD-10-CM

## 2020-03-05 PROCEDURE — 25010000002 FERRIC CARBOXYMALTOSE 750 MG/15ML SOLUTION 15 ML VIAL: Performed by: INTERNAL MEDICINE

## 2020-03-05 PROCEDURE — 63710000001 PROCHLORPERAZINE MALEATE PER 5 MG: Performed by: INTERNAL MEDICINE

## 2020-03-05 PROCEDURE — 96365 THER/PROPH/DIAG IV INF INIT: CPT

## 2020-03-05 RX ORDER — SODIUM CHLORIDE 9 MG/ML
250 INJECTION, SOLUTION INTRAVENOUS ONCE
Status: CANCELLED | OUTPATIENT
Start: 2020-03-12

## 2020-03-05 RX ORDER — SODIUM CHLORIDE 9 MG/ML
250 INJECTION, SOLUTION INTRAVENOUS ONCE
Status: COMPLETED | OUTPATIENT
Start: 2020-03-05 | End: 2020-03-05

## 2020-03-05 RX ORDER — PROCHLORPERAZINE MALEATE 5 MG/1
10 TABLET ORAL ONCE
Status: COMPLETED | OUTPATIENT
Start: 2020-03-05 | End: 2020-03-05

## 2020-03-05 RX ORDER — DIPHENHYDRAMINE HYDROCHLORIDE 50 MG/ML
50 INJECTION INTRAMUSCULAR; INTRAVENOUS AS NEEDED
Status: CANCELLED | OUTPATIENT
Start: 2020-03-12

## 2020-03-05 RX ORDER — METHYLPREDNISOLONE SODIUM SUCCINATE 125 MG/2ML
125 INJECTION, POWDER, LYOPHILIZED, FOR SOLUTION INTRAMUSCULAR; INTRAVENOUS AS NEEDED
Status: CANCELLED | OUTPATIENT
Start: 2020-03-12

## 2020-03-05 RX ORDER — PROCHLORPERAZINE MALEATE 5 MG/1
10 TABLET ORAL ONCE
Status: CANCELLED
Start: 2020-03-12

## 2020-03-05 RX ADMIN — PROCHLORPERAZINE MALEATE 10 MG: 5 TABLET ORAL at 15:27

## 2020-03-05 RX ADMIN — FERRIC CARBOXYMALTOSE INJECTION 750 MG: 50 INJECTION, SOLUTION INTRAVENOUS at 15:29

## 2020-03-05 RX ADMIN — SODIUM CHLORIDE 250 ML: 9 INJECTION, SOLUTION INTRAVENOUS at 15:28

## 2020-03-06 ENCOUNTER — ANTICOAGULATION VISIT (OUTPATIENT)
Dept: PHARMACY | Facility: HOSPITAL | Age: 65
End: 2020-03-06

## 2020-03-06 DIAGNOSIS — I63.9 RECURRENT STROKES (HCC): ICD-10-CM

## 2020-03-06 DIAGNOSIS — I63.10 CEREBROVASCULAR ACCIDENT (CVA) DUE TO EMBOLISM OF PRECEREBRAL ARTERY (HCC): Primary | ICD-10-CM

## 2020-03-06 LAB
INR PPP: 2.7 (ref 0.91–1.09)
PROTHROMBIN TIME: 33 SECONDS (ref 10–13.8)

## 2020-03-06 PROCEDURE — 85610 PROTHROMBIN TIME: CPT

## 2020-03-06 PROCEDURE — 36416 COLLJ CAPILLARY BLOOD SPEC: CPT

## 2020-03-06 NOTE — PROGRESS NOTES
I have supervised and reviewed the notes, assessments, and/or procedures performed by our PharmD Candidate. The documented assessment and plan were developed cooperatively. I concur with the documentation of this patient encounter.    Gregory Mei RP

## 2020-03-06 NOTE — PROGRESS NOTES
Anticoagulation Clinic Progress Note    Anticoagulation Summary  As of 3/6/2020    INR goal:   2.0-3.0   TTR:   60.8 % (1.1 y)   INR used for dosin.7 (3/6/2020)   Warfarin maintenance plan:   8 mg every Mon, Thu; 6 mg all other days   Weekly warfarin total:   46 mg   No change documented:   Zohaib Francis, Pharmacy Intern   Plan last modified:   Gregory Mei RP (6/3/2019)   Next INR check:   3/26/2020   Priority:   High   Target end date:       Indications    Recurrent strokes (CMS/HCC) [I63.9]             Anticoagulation Episode Summary     INR check location:       Preferred lab:       Send INR reminders to:    [facility]    Comments:         Anticoagulation Care Providers     Provider Role Specialty Phone number    Amber Murguia MD Referring Cardiology [phone]          Clinic Interview:  Patient Findings     Positives:   Change in health, Change in medications    Negatives:   Signs/symptoms of thrombosis, Signs/symptoms of bleeding,   Laboratory test error suspected, Change in alcohol use, Change in   activity, Upcoming invasive procedure, Emergency department visit,   Upcoming dental procedure, Missed doses, Extra doses, Change in   diet/appetite, Hospital admission, Bruising, Other complaints    Comments:   Pt started B12, folic acid, increased iron, increased   lisinopril last week (no intxn). Pt notes increased swelling since last   appt (has not talked to PCP about it).      Clinical Outcomes     Negatives:   Major bleeding event, Thromboembolic event,   Anticoagulation-related hospital admission, Anticoagulation-related ED   visit, Anticoagulation-related fatality    Comments:   Pt started B12, folic acid, increased iron, increased   lisinopril last week (no intxn). Pt notes increased swelling since last   appt (has not talked to PCP about it).        INR History:  Anticoagulation Monitoring 2019 2020 3/6/2020   INR 2.0 2.0 2.7   INR Date 2019  3/6/2020   INR Goal 2.0-3.0 2.0-3.0 2.0-3.0   Trend Same Same Same   Last Week Total 52 mg 36 mg 46 mg   Next Week Total 46 mg 48 mg 46 mg   Sun 6 mg 6 mg 6 mg   Mon 8 mg 10 mg (2/17); Otherwise 8 mg 8 mg   Tue 6 mg 6 mg 6 mg   Wed 6 mg 6 mg 6 mg   Thu 8 mg 8 mg 8 mg   Fri 6 mg 6 mg 6 mg   Sat 6 mg 6 mg 6 mg   Visit Report - - -   Some recent data might be hidden       Plan:  1. INR is Therapeutic today- see above in Anticoagulation Summary.  Will instruct Carlito Mo to Continue their warfarin regimen- see above in Anticoagulation Summary.  2. Follow up in ~3 weeks in lab (pt has CBC lab on 3/26, will coordinate with this draw to have INR done, as well).  3. Patient declines warfarin refills.  4. Verbal and written information provided. Patient expresses understanding and has no further questions at this time.    Zohaib Francis, Pharmacy Intern

## 2020-03-10 ENCOUNTER — OFFICE VISIT (OUTPATIENT)
Dept: CARDIOLOGY | Facility: CLINIC | Age: 65
End: 2020-03-10

## 2020-03-10 VITALS
DIASTOLIC BLOOD PRESSURE: 82 MMHG | WEIGHT: 198.8 LBS | HEART RATE: 66 BPM | BODY MASS INDEX: 27.83 KG/M2 | HEIGHT: 71 IN | SYSTOLIC BLOOD PRESSURE: 150 MMHG

## 2020-03-10 DIAGNOSIS — I10 ESSENTIAL HYPERTENSION: ICD-10-CM

## 2020-03-10 DIAGNOSIS — I25.10 CORONARY ARTERY DISEASE INVOLVING NATIVE CORONARY ARTERY OF NATIVE HEART WITHOUT ANGINA PECTORIS: ICD-10-CM

## 2020-03-10 DIAGNOSIS — Z99.89 OSA ON CPAP: ICD-10-CM

## 2020-03-10 DIAGNOSIS — G47.33 OSA ON CPAP: ICD-10-CM

## 2020-03-10 DIAGNOSIS — I50.42 CHRONIC COMBINED SYSTOLIC AND DIASTOLIC CONGESTIVE HEART FAILURE (HCC): Primary | ICD-10-CM

## 2020-03-10 DIAGNOSIS — I63.9 RECURRENT STROKES (HCC): ICD-10-CM

## 2020-03-10 PROCEDURE — 99213 OFFICE O/P EST LOW 20 MIN: CPT | Performed by: NURSE PRACTITIONER

## 2020-03-10 PROCEDURE — 93000 ELECTROCARDIOGRAM COMPLETE: CPT | Performed by: NURSE PRACTITIONER

## 2020-03-10 NOTE — PROGRESS NOTES
Date of Office Visit: 03/10/2020  Encounter Provider: Nadine Blake, JULIO C, APRN  Place of Service: [facility] CARDIOLOGY  Patient Name: Carlito Mo  :1955        Subjective:     Chief Complaint:  Follow-up, chronic combined systolic and diastolic heart failure, coronary disease, hypertension.      History of Present Illness:  Carlito Mo is a 64 y.o. male patient of Dr. Murguia.  I am seeing this patient in the office today and I have reviewed his records.    Patient has a history of   Patient has a history of coronary artery disease, cardiomyopathy, congestive heart failure, respiratory failure, chronic kidney disease, hypothyroidism, type 2 diabetes, history of stroke while on dual antiplatelet therapy now on warfarin.     Patient was first seen by Dr. Murguia 2017 when he was admitted with altered mental status.  He was diagnosed with multiple small acute infarcts.  Echocardiogram 2017 showed mild left ventricular hypertrophy, mild left atrial enlargement, negative bubble study, normal LV systolic function with an ejection fraction of 60%.  Transesophageal echo 2017 showed that his LV function was mildly low with an ejection fraction of 45% with focal wall motion abnormalities primarily in the septum and anterior wall and apex.  Small patent foramen ovale was noted and no significant valvular heart disease or cardiac source of emboli minus a small PFO were seen.  Holter monitor showed a couple of short bursts of SVT but no sustained arrhythmia.  He was seen back in the hospital 2017 in May 2018 with altered mental status, as well as heart failure exacerbations.  Last echocardiogram 2018 showed mildly decreased left ventricular systolic function with EF of 43%.  Severe hypokinesis of the distal anterior septum and anterior apex noted.  Mild mitral valve regurgitation was present, left atrial cavity size was dilated, right ventricular  cavity was mildly dilated, with trivial pericardial effusion. Patient presented to the ER 4/23/2019 complaining of intermittent chest discomfort overnight.  Troponin was elevated 0.045 and 0.044.  Troponins have been elevated in this range prior admissions as well.  He had nitroglycerin paste which helped with the pain.  Prior to this chest pain episode he had not had any other episodes of chest pain.  Nuclear stress test was ordered which showed medium sized infarct in the apex without significant ischemia.  Beta-blockade was restarted.  Cardiac catheterization to be considered if chest pain recurs or becomes persistent.  Patient presented to the hospital 7/2/2019 reporting increased leg swelling and shortness of breath over the previous week.  Chest x-ray showed increased and previously noted pleural effusions.  BNP was elevated above baseline.  Troponin was negative and EKG did not show acute changes.  Patient was admitted for IV diuresis and was then transitioned to oral diuretics.  He continued to improve with creatinine elevated though not beyond previous baseline range.  He was discharged 7/6/2019.      Patient presents to office today for follow-up appointment.  Patient reports he continues to have a lot of fatigue.  He has a son with special needs who does not let him sleep much.  Overall from a heart standpoint he is doing about the same as last visit.  He is now being followed by [facility].  He reports that he does get some weight fluctuation and some occasional increased swelling from baseline and has been trying to watch his salt which consists of not adding salt to foods and avoiding French fries but he still is not reading nutrition labels and he still continues to eat other fast food.  Recommended looking at nutrition labels and staying less than 2000 mg of sodium a day and avoiding fast food.  He appears fairly euvolemic in the office today though he is not wearing compression  socks and I encouraged him to start using these again.  He has some chronic left lower extremity swelling where he had vein grafting in the past.  He feels that his swelling is at baseline and reports his weight has been stable over the last week.  He gets some lightheadedness when he first gets up in the morning and he reports he gets up quickly.  Recommended changing positions more slowly and notifying office if this does not improve or if it worsens.  He might get a rare nosebleed which she attributes to the dryness of his CPAP and if this recurs he will discuss with the sleep apnea doctor whether or not humidification is needed.  He reports his shortness of breath is at baseline.  He might get some rare incisional chest discomfort at rest however nothing exertional.  He is followed by nephrology, Dr. Huddleston.  He is also followed by hematology for chronic anemia.  He reports blood pressure at home runs about 130s over 80s to 90 however this is before medications.  He denies any syncope, falls, or abnormal bleeding.          Past Medical History:   Diagnosis Date   • Acquired hypothyroidism    • Acute congestive heart failure (CMS/HCC)    • Acute respiratory failure with hypoxia (CMS/[facility])    • Acute sinusitis    • SYLVAIN (acute kidney injury) (CMS/[facility])     on CKD   • Anemia    • Arthritis    • CAD (coronary artery disease)    • Cancer (CMS/HCC)     skin   • Chronic combined systolic and diastolic congestive heart failure (CMS/HCC)    • Chronic ischemic heart disease    • CKD (chronic kidney disease)    • COPD (chronic obstructive pulmonary disease) (CMS/HCC)    • Depression    • Diabetes mellitus type 2, uncontrolled, without complications (CMS/HCC)    • Disease of thyroid gland    • Fatigue    • Frequent PVCs    • Heart attack (CMS/HCC)    • History of coronary artery disease     with remote history of bypass surgery in 2001   • History of transfusion    • Hyperglycemia    • Hyperlipidemia    • Hypertension    •  Hypertensive encephalopathy    • Mental status change     Acute   • YAW on CPAP    • Pleural effusion    • Pneumonia    • RBBB (right bundle branch block)    • Screening for prostate cancer 2011   • Stroke (CMS/HCC)    • TIA (transient ischemic attack)    • Type 2 diabetes mellitus (CMS/HCC)    • Urinary retention    • Vitamin D deficiency      Past Surgical History:   Procedure Laterality Date   • APPENDECTOMY N/A 02/14/2016    Dr. Alexey Dodson   • COLONOSCOPY      over 20 years ago.  no polyps    • COLONOSCOPY N/A 9/18/2018    Procedure: COLONOSCOPY;  Surgeon: Jose Enrique Louie MD;  Location: [facility] ENDOSCOPY;  Service: Gastroenterology   • COLONOSCOPY N/A 9/19/2018    IH, EH, polyps (TAs w/low grade dysplasia)   • CORONARY ARTERY BYPASS GRAFT  11/2001   • HERNIA REPAIR Left 12/05/2019   • TONSILLECTOMY     • VASECTOMY       Outpatient Medications Prior to Visit   Medication Sig Dispense Refill   • amLODIPine (NORVASC) 5 MG tablet Take 5 mg by mouth Daily.     • aspirin 81 MG EC tablet Take 81 mg by mouth Every Morning.     • atorvastatin (LIPITOR) 40 MG tablet Take 40 mg by mouth Every Night.     • buPROPion XL (WELLBUTRIN XL) 150 MG 24 hr tablet Take 150 mg by mouth Daily.     • Cholecalciferol (VITAMIN D3) 5000 units capsule capsule Take 5,000 Units by mouth Daily.     • ferrous sulfate 325 (65 FE) MG tablet Take 325 mg by mouth 3 (Three) Times a Day With Meals.     • folic acid (FOLVITE) 1 MG tablet Take 1 tablet by mouth Daily. 90 tablet 3   • furosemide (LASIX) 40 MG tablet Take 1 tablet by mouth Daily.     • Insulin Glargine (BASAGLAR KWIKPEN) 100 UNIT/ML injection pen Inject 8 Units under the skin into the appropriate area as directed Every Night. Patient says he does not take consistently     • insulin glulisine (APIDRA) 100 UNIT/ML injection Inject 2 Units under the skin into the appropriate area as directed 3 (Three) Times a Day Before Meals. Patient says he does not take consistently     •  levothyroxine (SYNTHROID, LEVOTHROID) 75 MCG tablet Take 75 mcg by mouth Daily.     • lisinopril (PRINIVIL,ZESTRIL) 20 MG tablet Take 1 tablet by mouth Daily. (Patient taking differently: Take 40 mg by mouth Daily.) 30 tablet 11   • metoprolol succinate XL (TOPROL-XL) 25 MG 24 hr tablet Take 1 tablet by mouth Daily. 30 tablet 11   • tamsulosin (FLOMAX) 0.4 MG capsule 24 hr capsule Take 1 capsule by mouth Every Night.     • vitamin B-12 (CYANOCOBALAMIN) 1000 MCG tablet Take 1 tablet by mouth Daily. 90 tablet 3   • warfarin (COUMADIN) 2 MG tablet Take 4 tablets (8 mg) by mouth every Mon and Thurs, and take 3 tablets (6 mg) by mouth all other days or as directed. 300 tablet 1     No facility-administered medications prior to visit.        Allergies as of 03/10/2020 - Reviewed 03/10/2020   Allergen Reaction Noted   • Fluoxetine Unknown - High Severity 03/12/2018   • Prozac [fluoxetine hcl] Other (See Comments) 12/17/2015     Social History     Socioeconomic History   • Marital status:      Spouse name: Lissette   • Number of children: Not on file   • Years of education: Not on file   • Highest education level: Not on file   Tobacco Use   • Smoking status: Never Smoker   • Smokeless tobacco: Never Used   • Tobacco comment: daily caffeine   Substance and Sexual Activity   • Alcohol use: No   • Drug use: No   • Sexual activity: Defer     Family History   Problem Relation Age of Onset   • Diabetes Mother    • Hypertension Mother    • Macular degeneration Mother    • Alcohol abuse Father    • Cancer Father         lung   • Heart disease Father    • Alcohol abuse Brother        Review of Systems   Constitution: Positive for chills, decreased appetite and malaise/fatigue. Negative for fever, weight gain and weight loss.   HENT: Positive for nosebleeds and sore throat. Negative for ear pain and hearing loss.    Eyes: Positive for blurred vision and pain. Negative for double vision, redness, vision loss in left eye and vision  "loss in right eye.   Cardiovascular: Positive for leg swelling.   Respiratory: Positive for shortness of breath and snoring. Negative for cough and wheezing.    Endocrine: Positive for cold intolerance. Negative for heat intolerance.   Skin: Negative for itching, rash and suspicious lesions.   Musculoskeletal: Positive for joint swelling. Negative for joint pain and myalgias.   Gastrointestinal: Negative for abdominal pain, diarrhea, hematemesis, melena, nausea and vomiting.   Genitourinary: Positive for decreased libido and frequency. Negative for dysuria and hematuria.        ED   Neurological: Positive for dizziness. Negative for headaches, numbness and seizures.        History of stroke   Psychiatric/Behavioral: Positive for depression. Negative for altered mental status. The patient is nervous/anxious.           Objective:     Vitals:    03/10/20 1144   BP: 150/82   BP Location: Right arm   Pulse: 66   Weight: 90.2 kg (198 lb 12.8 oz)   Height: 180.3 cm (71\")     Body mass index is 27.73 kg/m².      PHYSICAL EXAM:  Physical Exam   Constitutional: He is oriented to person, place, and time. He appears well-developed and well-nourished. No distress.   HENT:   Head: Normocephalic and atraumatic.   Eyes: Pupils are equal, round, and reactive to light.   Neck: Neck supple. No JVD present. Carotid bruit is not present.   Cardiovascular: Normal rate, regular rhythm, normal heart sounds and intact distal pulses. Exam reveals no gallop and no friction rub.   No murmur heard.  Pulses:       Radial pulses are 2+ on the right side, and 2+ on the left side.        Posterior tibial pulses are 2+ on the right side, and 2+ on the left side.   Pulmonary/Chest: Effort normal and breath sounds normal. No respiratory distress. He has no wheezes. He has no rales.   Abdominal: Soft. Bowel sounds are normal. He exhibits no distension. There is no tenderness.   Musculoskeletal: He exhibits edema (Trace to 1+, bilateral ankles). He " exhibits no tenderness.   Neurological: He is alert and oriented to person, place, and time.   Skin: Skin is warm and dry. He is not diaphoretic. No erythema.   Psychiatric: He has a normal mood and affect. His behavior is normal. Judgment normal.           ECG 12 Lead  Date/Time: 3/10/2020 1:04 PM  Performed by: Nadine Blake DNP, APRN  Authorized by: Nadine Blake DNP, JI   Comparison: compared with previous ECG from 5/25/2019  Similar to previous ECG  Rhythm: sinus rhythm  Rate: normal  BPM: 66  Conduction: right bundle branch block and 1st degree AV block  Other findings comments: Previous infarct    Clinical impression: abnormal EKG              Assessment:       Diagnosis Plan   1. Chronic combined systolic and diastolic congestive heart failure (CMS/HCC)     2. Coronary artery disease involving native coronary artery of native heart without angina pectoris     3. YAW on CPAP     4. Recurrent strokes (CMS/HCC)     5. Essential hypertension           Plan:     1. Chronic systolic and diastolic heart failure: On PO Lasix.  He does have some lower extremity swelling on exam however this is mild and he feels that his swelling is at baseline and he reports that his weight has stayed stable over the last week on his home scale (~192 lb).  Recommended using compression socks, continuing to avoid salt and staying less than 2000 mg a day, checking daily weights and calling for weight gain of more than 2 pounds in 1 day or 5 pounds in a week.  He is now followed by [facility] as well.  2. Coronary artery disease: With remote history of bypass graft surgery in 2001.  Stress test 4/2019 showed medium size infarct in the apex/periapical areas but no significant ischemia.  EF was mildly reduced at 44%, similar to EF on previous echocardiogram.  Patient on beta-blocker therapy, aspirin, atorvastatin.  Denies anginal symptoms.  We will continue to monitor.  3. History of stroke: NEISHA did not show  cardiac source of embolus.  ZIO Patch did not show atrial fibrillation.  Given that patient had recurrent strokes on aspirin and Plavix anticoagulation therapy was recommended by neurology.  Given chronic kidney disease warfarin was recommended.  4. Hypertension: Blood pressure is elevated in the office today however patient has not taken medications yet.  He reports blood pressure usually running 130s over 80s to 90 before medications at home.  I asked him to check blood pressure after medications and call if staying greater than 130/80 on average.  5. Hyperlipidemia: Managed by outside provider.  On statin therapy.  6. Diabetes: Managed by outside provider  7. Chronic kidney disease: Followed by Dr. Huddleston.  Lisinopril recently increased by Dr. Huddleston.  8. Chronic anemia: Followed by nephrology and hematology.  Denies abnormal bleeding.  9. Chronic fatigue  10. Chronically elevated troponin  11. Right bundle branch block    Patient to follow-up with Dr. Murguia in 6 months or sooner if needed for any new, recurrent, or worsening symptoms or other problems/concerns.           Your medication list           Accurate as of March 10, 2020  1:16 PM. If you have any questions, ask your nurse or doctor.               CHANGE how you take these medications      Instructions Last Dose Given Next Dose Due   lisinopril 20 MG tablet  Commonly known as:  PRINIVIL,ZESTRIL  What changed:  how much to take      Take 1 tablet by mouth Daily.          CONTINUE taking these medications      Instructions Last Dose Given Next Dose Due   amLODIPine 5 MG tablet  Commonly known as:  NORVASC      Take 5 mg by mouth Daily.       APIDRA 100 UNIT/ML injection  Generic drug:  insulin glulisine      Inject 2 Units under the skin into the appropriate area as directed 3 (Three) Times a Day Before Meals. Patient says he does not take consistently       aspirin 81 MG EC tablet      Take 81 mg by mouth Every Morning.       atorvastatin 40 MG  tablet  Commonly known as:  LIPITOR      Take 40 mg by mouth Every Night.       buPROPion  MG 24 hr tablet  Commonly known as:  WELLBUTRIN XL      Take 150 mg by mouth Daily.       ferrous sulfate 325 (65 FE) MG tablet      Take 325 mg by mouth 3 (Three) Times a Day With Meals.       folic acid 1 MG tablet  Commonly known as:  FOLVITE      Take 1 tablet by mouth Daily.       furosemide 40 MG tablet  Commonly known as:  LASIX      Take 1 tablet by mouth Daily.       Insulin Glargine 100 UNIT/ML injection pen  Commonly known as:  BASAGLAR KWIKPEN      Inject 8 Units under the skin into the appropriate area as directed Every Night. Patient says he does not take consistently       levothyroxine 75 MCG tablet  Commonly known as:  SYNTHROID, LEVOTHROID      Take 75 mcg by mouth Daily.       metoprolol succinate XL 25 MG 24 hr tablet  Commonly known as:  TOPROL-XL      Take 1 tablet by mouth Daily.       tamsulosin 0.4 MG capsule 24 hr capsule  Commonly known as:  FLOMAX      Take 1 capsule by mouth Every Night.       vitamin B-12 1000 MCG tablet  Commonly known as:  CYANOCOBALAMIN      Take 1 tablet by mouth Daily.       vitamin D3 125 MCG (5000 UT) capsule capsule      Take 5,000 Units by mouth Daily.       warfarin 2 MG tablet  Commonly known as:  COUMADIN      Take 4 tablets (8 mg) by mouth every Mon and Thurs, and take 3 tablets (6 mg) by mouth all other days or as directed.              I did not stop or change the above medications.  Patient's medication list was updated to reflect medications they are currently taking including medication changes made by other providers.        Thanks,    Nadine Blake, JULIO C, APRN  03/10/2020         Dictated utilizing Dragon dictation

## 2020-03-12 ENCOUNTER — INFUSION (OUTPATIENT)
Dept: ONCOLOGY | Facility: HOSPITAL | Age: 65
End: 2020-03-12

## 2020-03-12 VITALS
OXYGEN SATURATION: 93 % | HEART RATE: 64 BPM | TEMPERATURE: 98.8 F | BODY MASS INDEX: 28.03 KG/M2 | SYSTOLIC BLOOD PRESSURE: 153 MMHG | DIASTOLIC BLOOD PRESSURE: 78 MMHG | WEIGHT: 201 LBS

## 2020-03-12 DIAGNOSIS — N18.30 ANEMIA, CHRONIC RENAL FAILURE, STAGE 3 (MODERATE) (HCC): Primary | ICD-10-CM

## 2020-03-12 DIAGNOSIS — D63.1 ANEMIA, CHRONIC RENAL FAILURE, STAGE 3 (MODERATE) (HCC): Primary | ICD-10-CM

## 2020-03-12 DIAGNOSIS — D50.9 IRON DEFICIENCY ANEMIA, UNSPECIFIED IRON DEFICIENCY ANEMIA TYPE: ICD-10-CM

## 2020-03-12 PROCEDURE — 25010000002 FERRIC CARBOXYMALTOSE 750 MG/15ML SOLUTION 15 ML VIAL: Performed by: INTERNAL MEDICINE

## 2020-03-12 PROCEDURE — 63710000001 PROCHLORPERAZINE MALEATE PER 5 MG: Performed by: INTERNAL MEDICINE

## 2020-03-12 PROCEDURE — 96365 THER/PROPH/DIAG IV INF INIT: CPT

## 2020-03-12 PROCEDURE — 96374 THER/PROPH/DIAG INJ IV PUSH: CPT

## 2020-03-12 RX ORDER — SODIUM CHLORIDE 9 MG/ML
250 INJECTION, SOLUTION INTRAVENOUS ONCE
Status: CANCELLED | OUTPATIENT
Start: 2020-03-19

## 2020-03-12 RX ORDER — CARVEDILOL 6.25 MG/1
6.25 TABLET ORAL 2 TIMES DAILY WITH MEALS
COMMUNITY
Start: 2020-03-04 | End: 2020-06-04 | Stop reason: HOSPADM

## 2020-03-12 RX ORDER — PROCHLORPERAZINE MALEATE 5 MG/1
10 TABLET ORAL ONCE
Status: COMPLETED | OUTPATIENT
Start: 2020-03-12 | End: 2020-03-12

## 2020-03-12 RX ORDER — PROCHLORPERAZINE MALEATE 5 MG/1
10 TABLET ORAL ONCE
Status: CANCELLED
Start: 2020-03-19

## 2020-03-12 RX ORDER — METHYLPREDNISOLONE SODIUM SUCCINATE 125 MG/2ML
125 INJECTION, POWDER, LYOPHILIZED, FOR SOLUTION INTRAMUSCULAR; INTRAVENOUS AS NEEDED
Status: CANCELLED | OUTPATIENT
Start: 2020-03-19

## 2020-03-12 RX ORDER — DIPHENHYDRAMINE HYDROCHLORIDE 50 MG/ML
50 INJECTION INTRAMUSCULAR; INTRAVENOUS AS NEEDED
Status: CANCELLED | OUTPATIENT
Start: 2020-03-19

## 2020-03-12 RX ORDER — SODIUM CHLORIDE 9 MG/ML
250 INJECTION, SOLUTION INTRAVENOUS ONCE
Status: COMPLETED | OUTPATIENT
Start: 2020-03-12 | End: 2020-03-12

## 2020-03-12 RX ORDER — LISINOPRIL 40 MG/1
40 TABLET ORAL DAILY
COMMUNITY
Start: 2020-03-04 | End: 2020-06-04 | Stop reason: HOSPADM

## 2020-03-12 RX ADMIN — FERRIC CARBOXYMALTOSE INJECTION 750 MG: 50 INJECTION, SOLUTION INTRAVENOUS at 16:13

## 2020-03-12 RX ADMIN — PROCHLORPERAZINE MALEATE 10 MG: 5 TABLET ORAL at 16:10

## 2020-03-12 RX ADMIN — SODIUM CHLORIDE 250 ML: 9 INJECTION, SOLUTION INTRAVENOUS at 16:13

## 2020-03-26 ENCOUNTER — LAB (OUTPATIENT)
Dept: LAB | Facility: HOSPITAL | Age: 65
End: 2020-03-26

## 2020-03-26 ENCOUNTER — INFUSION (OUTPATIENT)
Dept: ONCOLOGY | Facility: HOSPITAL | Age: 65
End: 2020-03-26

## 2020-03-26 DIAGNOSIS — N18.30 ANEMIA, CHRONIC RENAL FAILURE, STAGE 3 (MODERATE) (HCC): ICD-10-CM

## 2020-03-26 DIAGNOSIS — D50.9 IRON DEFICIENCY ANEMIA, UNSPECIFIED IRON DEFICIENCY ANEMIA TYPE: ICD-10-CM

## 2020-03-26 DIAGNOSIS — D63.1 ANEMIA, CHRONIC RENAL FAILURE, STAGE 3 (MODERATE) (HCC): ICD-10-CM

## 2020-03-26 LAB
BASOPHILS # BLD AUTO: 0.01 10*3/MM3 (ref 0–0.2)
BASOPHILS NFR BLD AUTO: 0.1 % (ref 0–1.5)
DEPRECATED RDW RBC AUTO: 45.8 FL (ref 37–54)
EOSINOPHIL # BLD AUTO: 0.2 10*3/MM3 (ref 0–0.4)
EOSINOPHIL NFR BLD AUTO: 2.8 % (ref 0.3–6.2)
ERYTHROCYTE [DISTWIDTH] IN BLOOD BY AUTOMATED COUNT: 14.8 % (ref 12.3–15.4)
FERRITIN SERPL-MCNC: 860.8 NG/ML (ref 30–400)
HCT VFR BLD AUTO: 34 % (ref 37.5–51)
HGB BLD-MCNC: 10.6 G/DL (ref 13–17.7)
IMM GRANULOCYTES # BLD AUTO: 0.02 10*3/MM3 (ref 0–0.05)
IMM GRANULOCYTES NFR BLD AUTO: 0.3 % (ref 0–0.5)
INR PPP: 2.48 (ref 0.9–1.1)
IRON 24H UR-MRATE: 66 MCG/DL (ref 59–158)
IRON SATN MFR SERPL: 24 % (ref 14–48)
LYMPHOCYTES # BLD AUTO: 0.94 10*3/MM3 (ref 0.7–3.1)
LYMPHOCYTES NFR BLD AUTO: 13.1 % (ref 19.6–45.3)
MCH RBC QN AUTO: 26.5 PG (ref 26.6–33)
MCHC RBC AUTO-ENTMCNC: 31.2 G/DL (ref 31.5–35.7)
MCV RBC AUTO: 85 FL (ref 79–97)
MONOCYTES # BLD AUTO: 0.57 10*3/MM3 (ref 0.1–0.9)
MONOCYTES NFR BLD AUTO: 7.9 % (ref 5–12)
NEUTROPHILS # BLD AUTO: 5.44 10*3/MM3 (ref 1.7–7)
NEUTROPHILS NFR BLD AUTO: 75.8 % (ref 42.7–76)
NRBC BLD AUTO-RTO: 0 /100 WBC (ref 0–0.2)
PLATELET # BLD AUTO: 191 10*3/MM3 (ref 140–450)
PMV BLD AUTO: 9.5 FL (ref 6–12)
PROTHROMBIN TIME: 26.6 SECONDS (ref 11.7–14.2)
RBC # BLD AUTO: 4 10*6/MM3 (ref 4.14–5.8)
TIBC SERPL-MCNC: 274 MCG/DL (ref 249–505)
TRANSFERRIN SERPL-MCNC: 196 MG/DL (ref 200–360)
WBC NRBC COR # BLD: 7.18 10*3/MM3 (ref 3.4–10.8)

## 2020-03-26 PROCEDURE — 82728 ASSAY OF FERRITIN: CPT

## 2020-03-26 PROCEDURE — 85025 COMPLETE CBC W/AUTO DIFF WBC: CPT

## 2020-03-26 PROCEDURE — 36415 COLL VENOUS BLD VENIPUNCTURE: CPT

## 2020-03-26 PROCEDURE — 84466 ASSAY OF TRANSFERRIN: CPT

## 2020-03-26 PROCEDURE — G0463 HOSPITAL OUTPT CLINIC VISIT: HCPCS

## 2020-03-26 PROCEDURE — 85610 PROTHROMBIN TIME: CPT

## 2020-03-26 PROCEDURE — 83540 ASSAY OF IRON: CPT

## 2020-03-26 NOTE — PROGRESS NOTES
Hgb today is 10.6.  No procrit today per guidelines.   Iron labs pending.  Patient without questions or concerns at this time.   Given copy of labs.

## 2020-03-27 ENCOUNTER — ANTICOAGULATION VISIT (OUTPATIENT)
Dept: PHARMACY | Facility: HOSPITAL | Age: 65
End: 2020-03-27

## 2020-03-27 DIAGNOSIS — I63.9 RECURRENT STROKES (HCC): ICD-10-CM

## 2020-03-27 NOTE — PROGRESS NOTES
Anticoagulation Clinic Progress Note    Anticoagulation Summary  As of 3/27/2020    INR goal:   2.0-3.0   TTR:   62.7 % (1.1 y)   INR used for dosin.48 (3/26/2020)   Warfarin maintenance plan:   8 mg every Mon, Thu; 6 mg all other days   Weekly warfarin total:   46 mg   No change documented:   Gregory Mei RPH   Plan last modified:   Gregory Mei RPH (6/3/2019)   Next INR check:   2020   Priority:   High   Target end date:       Indications    Recurrent strokes (CMS/HCC) [I63.9]             Anticoagulation Episode Summary     INR check location:       Preferred lab:       Send INR reminders to:    [facility]    Comments:         Anticoagulation Care Providers     Provider Role Specialty Phone number    Amber Murguia MD Referring Cardiology [phone]            Clinic Interview:  Patient Findings     Negatives:   Signs/symptoms of thrombosis, Signs/symptoms of bleeding,   Laboratory test error suspected, Change in health, Change in alcohol use,   Change in activity, Upcoming invasive procedure, Emergency department   visit, Upcoming dental procedure, Missed doses, Extra doses, Change in   medications, Change in diet/appetite, Hospital admission, Bruising, Other   complaints      Clinical Outcomes     Negatives:   Major bleeding event, Thromboembolic event,   Anticoagulation-related hospital admission, Anticoagulation-related ED   visit, Anticoagulation-related fatality        INR History:  Anticoagulation Monitoring 2020 3/6/2020 3/27/2020   INR 2.0 2.7 2.48   INR Date 2020 3/6/2020 3/26/2020   INR Goal 2.0-3.0 2.0-3.0 2.0-3.0   Trend Same Same Same   Last Week Total 36 mg 46 mg 46 mg   Next Week Total 48 mg 46 mg 46 mg   Sun 6 mg 6 mg 6 mg   Mon 10 mg (); Otherwise 8 mg 8 mg 8 mg   Tue 6 mg 6 mg 6 mg   Wed 6 mg 6 mg 6 mg   Thu 8 mg 8 mg 8 mg   Fri 6 mg 6 mg 6 mg   Sat 6 mg 6 mg 6 mg   Visit Report - - -   Some recent data might be hidden       Plan:  1. INR is  Therapeutic today- see above in Anticoagulation Summary.   Will instruct Carlito Mo to Continue their warfarin regimen- see above in Anticoagulation Summary.  2. Follow up in 6 weeks  3. They have been instructed to call if any changes in medications, doses, concerns, etc. Patient expresses understanding and has no further questions at this time.    Gregory Mei [facility]

## 2020-04-07 ENCOUNTER — OFFICE VISIT (OUTPATIENT)
Dept: ONCOLOGY | Facility: CLINIC | Age: 65
End: 2020-04-07

## 2020-04-07 ENCOUNTER — LAB (OUTPATIENT)
Dept: LAB | Facility: HOSPITAL | Age: 65
End: 2020-04-07

## 2020-04-07 ENCOUNTER — APPOINTMENT (OUTPATIENT)
Dept: ONCOLOGY | Facility: HOSPITAL | Age: 65
End: 2020-04-07

## 2020-04-07 VITALS
DIASTOLIC BLOOD PRESSURE: 70 MMHG | HEIGHT: 71 IN | TEMPERATURE: 98.1 F | OXYGEN SATURATION: 96 % | BODY MASS INDEX: 25.26 KG/M2 | RESPIRATION RATE: 18 BRPM | WEIGHT: 180.4 LBS | SYSTOLIC BLOOD PRESSURE: 137 MMHG | HEART RATE: 67 BPM

## 2020-04-07 DIAGNOSIS — N18.30 ANEMIA, CHRONIC RENAL FAILURE, STAGE 3 (MODERATE) (HCC): ICD-10-CM

## 2020-04-07 DIAGNOSIS — D50.9 IRON DEFICIENCY ANEMIA, UNSPECIFIED IRON DEFICIENCY ANEMIA TYPE: Primary | ICD-10-CM

## 2020-04-07 DIAGNOSIS — D63.1 ANEMIA, CHRONIC RENAL FAILURE, STAGE 3 (MODERATE) (HCC): ICD-10-CM

## 2020-04-07 DIAGNOSIS — R53.82 CHRONIC FATIGUE: ICD-10-CM

## 2020-04-07 LAB
BASOPHILS # BLD AUTO: 0.01 10*3/MM3 (ref 0–0.2)
BASOPHILS NFR BLD AUTO: 0.2 % (ref 0–1.5)
DEPRECATED RDW RBC AUTO: 44.5 FL (ref 37–54)
EOSINOPHIL # BLD AUTO: 0.21 10*3/MM3 (ref 0–0.4)
EOSINOPHIL NFR BLD AUTO: 3.7 % (ref 0.3–6.2)
ERYTHROCYTE [DISTWIDTH] IN BLOOD BY AUTOMATED COUNT: 14.6 % (ref 12.3–15.4)
FERRITIN SERPL-MCNC: 741.8 NG/ML (ref 30–400)
HCT VFR BLD AUTO: 36.1 % (ref 37.5–51)
HGB BLD-MCNC: 11.5 G/DL (ref 13–17.7)
IMM GRANULOCYTES # BLD AUTO: 0.01 10*3/MM3 (ref 0–0.05)
IMM GRANULOCYTES NFR BLD AUTO: 0.2 % (ref 0–0.5)
IRON 24H UR-MRATE: 71 MCG/DL (ref 59–158)
IRON SATN MFR SERPL: 27 % (ref 14–48)
LYMPHOCYTES # BLD AUTO: 0.82 10*3/MM3 (ref 0.7–3.1)
LYMPHOCYTES NFR BLD AUTO: 14.5 % (ref 19.6–45.3)
MCH RBC QN AUTO: 26.9 PG (ref 26.6–33)
MCHC RBC AUTO-ENTMCNC: 31.9 G/DL (ref 31.5–35.7)
MCV RBC AUTO: 84.3 FL (ref 79–97)
MONOCYTES # BLD AUTO: 0.45 10*3/MM3 (ref 0.1–0.9)
MONOCYTES NFR BLD AUTO: 7.9 % (ref 5–12)
NEUTROPHILS # BLD AUTO: 4.17 10*3/MM3 (ref 1.7–7)
NEUTROPHILS NFR BLD AUTO: 73.5 % (ref 42.7–76)
NRBC BLD AUTO-RTO: 0 /100 WBC (ref 0–0.2)
PLATELET # BLD AUTO: 191 10*3/MM3 (ref 140–450)
PMV BLD AUTO: 10.4 FL (ref 6–12)
RBC # BLD AUTO: 4.28 10*6/MM3 (ref 4.14–5.8)
TIBC SERPL-MCNC: 265 MCG/DL (ref 249–505)
TRANSFERRIN SERPL-MCNC: 189 MG/DL (ref 200–360)
WBC NRBC COR # BLD: 5.67 10*3/MM3 (ref 3.4–10.8)

## 2020-04-07 PROCEDURE — 83540 ASSAY OF IRON: CPT

## 2020-04-07 PROCEDURE — 82728 ASSAY OF FERRITIN: CPT

## 2020-04-07 PROCEDURE — 85025 COMPLETE CBC W/AUTO DIFF WBC: CPT

## 2020-04-07 PROCEDURE — 36415 COLL VENOUS BLD VENIPUNCTURE: CPT

## 2020-04-07 PROCEDURE — 99213 OFFICE O/P EST LOW 20 MIN: CPT | Performed by: NURSE PRACTITIONER

## 2020-04-07 PROCEDURE — 84466 ASSAY OF TRANSFERRIN: CPT

## 2020-04-07 NOTE — PROGRESS NOTES
REASON FOR FOLLOW-UP:   Multifactorial anemia in the setting of stage III CKD and iron deficiency.    HISTORY OF PRESENT ILLNESS:  The patient is a 64 y.o. male with a history of chronic anemia in the setting of CKD and iron deficiency.  The patient was seen in consultation 6 weeks ago and found to be iron deficient with a ferritin of 73, iron saturation of 11%.  We discussed at that time that he could be a candidate for Procrit as well though we needed to get his iron stores replete.  Dr. Hoyos also recommended the patient start on oral folic acid as well as vitamin B12.    The patient went on to receive IV Injectafer x2 doses in early March and repeat iron studies done 3/26/2020 showed improvement in iron saturation of 24%.    As he is reviewed back today we discussed that his hemoglobin has markedly improved up to 11.5.  He is encouraged by this.  Nevertheless he remains fatigued though he understands this is multifaceted including that he cares for his autistic son.  We discussed at least from an iron deficiency standpoint we have corrected this and this is no longer attributing to his fatigue.    Past Medical History:   Diagnosis Date   • Acquired hypothyroidism    • Acute congestive heart failure (CMS/HCC)    • Acute respiratory failure with hypoxia (CMS/HCC)    • Acute sinusitis    • SYLVAIN (acute kidney injury) (CMS/HCC)     on CKD   • Anemia    • Arthritis    • CAD (coronary artery disease)    • Cancer (CMS/HCC)     skin   • Chronic combined systolic and diastolic congestive heart failure (CMS/HCC)    • Chronic ischemic heart disease    • CKD (chronic kidney disease)    • COPD (chronic obstructive pulmonary disease) (CMS/HCC)    • Depression    • Diabetes mellitus type 2, uncontrolled, without complications (CMS/HCC)    • Disease of thyroid gland    • Fatigue    • Frequent PVCs    • Heart attack (CMS/HCC)    • History of coronary artery disease     with remote history of bypass surgery in 2001   •  History of transfusion    • Hyperglycemia    • Hyperlipidemia    • Hypertension    • Hypertensive encephalopathy    • Mental status change     Acute   • YAW on CPAP    • Pleural effusion    • Pneumonia    • RBBB (right bundle branch block)    • Screening for prostate cancer 2011   • Stroke (CMS/HCC)    • TIA (transient ischemic attack)    • Type 2 diabetes mellitus (CMS/HCC)    • Urinary retention    • Vitamin D deficiency      Past Surgical History:   Procedure Laterality Date   • APPENDECTOMY N/A 02/14/2016    Dr. Alexey Dodson   • COLONOSCOPY      over 20 years ago.  no polyps    • COLONOSCOPY N/A 9/18/2018    Procedure: COLONOSCOPY;  Surgeon: Jose Enrique Louie MD;  Location: [facility] ENDOSCOPY;  Service: Gastroenterology   • COLONOSCOPY N/A 9/19/2018    IH, EH, polyps (TAs w/low grade dysplasia)   • CORONARY ARTERY BYPASS GRAFT  11/2001   • HERNIA REPAIR Left 12/05/2019   • TONSILLECTOMY     • VASECTOMY       ONCOLOGIC/HEMATOLOGIC HISTORY:  history of hypertension, congestive heart failure, diabetes who presents today for initial evaluation of chronic anemia.  The patient presents by himself today.    He notes that he was officially diagnosed with anemia about 6 months ago.  He was started on oral ferrous sulfate 325 mg twice a day at that time, but this was increased to three times a day about a month ago.  He reports having very dark and solid stools and some constipation since he started taking the oral iron.  He reports taking a stool softener only as needed for his constipation.     He is not taking any vitamin supplements at this time.      Today, he reports feeling very exhausted, especially with taking care of his son with Autism.  He also reports difficulty sleeping at night due to this.  He also reports occasional mediastinal chest pain, but he denies any abdominal pain.    The patient is anticoagulated on Coumadin due to his history of recurrent strokes.  He denies any abnormal bleeding, such as  melena or hematochezia.  He reports he has had chronic leg swelling since he had coronary bypass surgery about 9 years ago.  However, he denies history of a prior DVT/PE.     He has history of stage III CKD, and his nephrologist is Dr. Nima Huddleston.    He also has history of diabetes mellitus, and his glucose level is typically around the 150's.    The patient denies any history of alcohol or tobacco use.  He lives at home with both of his sons, and he reports that his son with Autism smokes cigarettes.  He reports his other son is on dialysis.    Per our records, the patient has had persistent anemia since August 2015.  I have reviewed laboratory studies from 8/20/2015, he had hemoglobin 12.5.  His hemoglobin electrodes over time, and most recently has been less than 10 g since 11/20/2019.  He is laboratory study on 10/8/2018 reported ferritin 43.3 ng/mL, free iron 45 TIBC 295 and iron saturation 15%.  His vitamin B12 level was 495 pg/mL.  His hemoglobin was 11.6 MCV 77.9 MCHC 31.9, with WBC 6100 and platelets 384,000 10/2/2018.     On 7/4/2019, had a repeat laboratory study reporting iron saturation 7%, free iron 23 and a TIBC 349, without a ferritin level.  Serum folate was 9.2 ng/mL and vitamin B12 at 416 pg/mL.  His hemoglobin today was 10.5, MCV 78.9 in the normal platelets 293,000 WBC 7900.  Patient has stage III chronic and will insufficiency with most recent baseline creatinine 1.8 on 1/16/2020.    Laboratory studies 2/27/2020 show anemia hemoglobin 9.8 MCV 79.5, MCHC 32.3, platelets 231,000 and the WBC 6520.    Patient given IV Injectafer x2 doses on 3/5 and 3/12/2020.  Repeat iron studies 3/26/2020 with iron saturation of 24%, TIBC 274, iron 66, ferritin 860.  Hemoglobin improving up to 11.5 as of 4/7/2020.    MEDICATIONS    Current Outpatient Medications:   •  amLODIPine (NORVASC) 5 MG tablet, Take 5 mg by mouth Daily., Disp: , Rfl:   •  aspirin 81 MG EC tablet, Take 81 mg by mouth Every Morning., Disp:  , Rfl:   •  atorvastatin (LIPITOR) 40 MG tablet, Take 40 mg by mouth Every Night., Disp: , Rfl:   •  buPROPion XL (WELLBUTRIN XL) 150 MG 24 hr tablet, Take 150 mg by mouth Daily., Disp: , Rfl:   •  carvedilol (COREG) 6.25 MG tablet, Take 6.25 mg by mouth 2 (Two) Times a Day With Meals., Disp: , Rfl:   •  Cholecalciferol (VITAMIN D3) 5000 units capsule capsule, Take 5,000 Units by mouth Daily., Disp: , Rfl:   •  ferrous sulfate 325 (65 FE) MG tablet, Take 325 mg by mouth 3 (Three) Times a Day With Meals., Disp: , Rfl:   •  folic acid (FOLVITE) 1 MG tablet, Take 1 tablet by mouth Daily., Disp: 90 tablet, Rfl: 3  •  furosemide (LASIX) 40 MG tablet, Take 1 tablet by mouth Daily., Disp: , Rfl:   •  Insulin Glargine (BASAGLAR KWIKPEN) 100 UNIT/ML injection pen, Inject 8 Units under the skin into the appropriate area as directed Every Night. Patient says he does not take consistently, Disp: , Rfl:   •  insulin glulisine (APIDRA) 100 UNIT/ML injection, Inject 2 Units under the skin into the appropriate area as directed 3 (Three) Times a Day Before Meals. Patient says he does not take consistently, Disp: , Rfl:   •  levothyroxine (SYNTHROID, LEVOTHROID) 75 MCG tablet, Take 75 mcg by mouth Daily., Disp: , Rfl:   •  lisinopril (PRINIVIL,ZESTRIL) 40 MG tablet, Take 40 mg by mouth Daily., Disp: , Rfl:   •  metoprolol succinate XL (TOPROL-XL) 25 MG 24 hr tablet, Take 1 tablet by mouth Daily., Disp: 30 tablet, Rfl: 11  •  tamsulosin (FLOMAX) 0.4 MG capsule 24 hr capsule, Take 1 capsule by mouth Every Night., Disp: , Rfl:   •  vitamin B-12 (CYANOCOBALAMIN) 1000 MCG tablet, Take 1 tablet by mouth Daily., Disp: 90 tablet, Rfl: 3  •  warfarin (COUMADIN) 2 MG tablet, Take 4 tablets (8 mg) by mouth every Mon and Thurs, and take 3 tablets (6 mg) by mouth all other days or as directed., Disp: 300 tablet, Rfl: 1    ALLERGIES:     Allergies   Allergen Reactions   • Fluoxetine Unknown - High Severity   • Prozac [Fluoxetine Hcl] Other (See  Comments)     shaking       SOCIAL HISTORY:       Social History     Socioeconomic History   • Marital status:      Spouse name: Lissette   • Number of children: 3   • Years of education: college   • Highest education level: Not on file   Occupational History     Employer: RETIRED   Tobacco Use   • Smoking status: Never Smoker   • Smokeless tobacco: Never Used   • Tobacco comment: daily caffeine   Substance and Sexual Activity   • Alcohol use: No   • Drug use: No   • Sexual activity: Defer   College education, retired  and .      FAMILY HISTORY:   Father  of lung cancer at age 65.  Mother  of accident from a fall at age of 93.  Brother was diagnosed of liver cancer at age 45, passed away at about age of 50, he also had liver cirrhosis.  Patient has 3 children, one his kidney failure on dialysis with diabetes.  Another son he has autism.  Eldest child is in good health condition.    Family History   Problem Relation Age of Onset   • Diabetes Mother    • Hypertension Mother    • Macular degeneration Mother    • Alcohol abuse Father    • Cancer Father         lung,  age 65   • Heart disease Father    • Alcohol abuse Brother    • Cirrhosis Brother          age 50   • Liver cancer Brother 45   • Diabetes Son    • Kidney failure Son    • Autism Son        REVIEW OF SYSTEMS:  Review of Systems   Constitutional: Positive for diaphoresis (Some night sweats), fatigue and unexpected weight change (Weight gain). Negative for chills and fever.   HENT: Positive for sore throat. Negative for hearing loss, nosebleeds and tinnitus.    Eyes: Positive for visual disturbance (Poor vision). Negative for pain.   Respiratory: Positive for shortness of breath (Exertional dyspnea). Negative for cough and wheezing.    Cardiovascular: Positive for chest pain (occasional) and leg swelling (chronic). Negative for palpitations.   Gastrointestinal: Positive for constipation (occasional).  "Negative for abdominal pain, blood in stool, diarrhea, nausea and vomiting.   Endocrine: Positive for cold intolerance. Negative for heat intolerance, polydipsia and polyuria.   Genitourinary: Positive for frequency. Negative for difficulty urinating, dysuria and hematuria.   Musculoskeletal: Positive for joint swelling (Ankle swelling). Negative for arthralgias and myalgias.   Skin: Negative for rash and wound.        Pruritus without skin rashes.   Allergic/Immunologic: Negative for environmental allergies.   Neurological: Positive for dizziness. Negative for syncope and numbness.   Hematological: Does not bruise/bleed easily.   Psychiatric/Behavioral: Positive for sleep disturbance (difficulty sleeping). Negative for dysphoric mood. The patient is not nervous/anxious.               Vitals:    04/07/20 1303   BP: 137/70   Pulse: 67   Resp: 18   Temp: 98.1 °F (36.7 °C)   TempSrc: Oral   SpO2: 96%   Weight: 81.8 kg (180 lb 6.4 oz)   Height: 180.3 cm (70.98\")   PainSc: 0-No pain     Current Status 4/7/2020   ECOG score 0       PHYSICAL EXAM:    CONSTITUTIONAL:  Vital signs reviewed.    No distress, looks comfortable.  EYES:  Conjunctiva and lids unremarkable.  Extraocular eye movements intact.  EARS,NOSE,MOUTH,THROAT:  Ears and nose appear unremarkable.  Lips, teeth, gums appear unremarkable.  RESPIRATORY:  Normal respiratory effort.    CARDIOVASCULAR:    No significant lower extremity edema.  SKIN: No wounds.  No rashes.  MUSCULOSKELETAL/EXTREMITIES: No clubbing or cyanosis.  No apparent unilateral weakness.  NEURO: CN 2-12 appear intact. No focal neurological deficits noted.  PSYCHIATRIC:  Normal judgment and insight.  Normal mood and affect.      RECENT LABS:    Lab Results   Component Value Date    NEUTROABS 4.17 04/07/2020      Lab Results   Component Value Date    WBC 5.67 04/07/2020    HGB 11.5 (L) 04/07/2020    HCT 36.1 (L) 04/07/2020    MCV 84.3 04/07/2020     04/07/2020     Lab Results   Component " Value Date    GLUCOSE 100 (H) 07/06/2019    BUN 26 (H) 01/16/2020    CREATININE 1.8 (H) 01/16/2020    EGFRIFNONA 35 (L) 07/06/2019    EGFRIFAFRI 74 12/12/2017    BCR 14.4 01/16/2020    K 4.5 01/16/2020    CO2 26 01/16/2020    CALCIUM 9.1 01/16/2020    PROTENTOTREF 6.7 12/12/2017    ALBUMIN 4.0 11/20/2019    LABIL2 1.5 11/20/2019    AST 20 11/20/2019    ALT 18 11/20/2019        Lab Results   Component Value Date    IRON 66 03/26/2020    TIBC 274 03/26/2020    FERRITIN 860.80 (H) 03/26/2020        Assessment/Plan   There are no diagnoses linked to this encounter.  Carlito Mo     ASSESSMENT:  1.  Chronic anemia with evidence of iron deficiency.  Per our records, the patient has had persistent anemia since August 2015.    · I have reviewed laboratory studies for the past 4 years, with documented iron deficiency.    · He was started on oral ferrous sulfate 325 mg twice daily about 6 months ago, but this was increased to three times a day as of January 2020. Laboratory studies today, 2/27/2020, show persistent anemia.  We will also check his iron studies today.  This patient likely has anemia with multiple factorial including iron deficiency, and the stage III renal insufficiency more other things.  He is a candidate for HAI such as Procrit injection, however iron deficiency needs correcting first.  Patient also started on oral B12 and folic acid.  · Patient receiving IV Injectafer x2 doses on 3/5 and 3/12/2020.   · Repeat iron studies 3/26/2020 showing repletion of iron with iron saturation of 24%, ferritin 860.  Hemoglobin up to 10.6.  · Patient reviewed today, 4/7/2020.  Hemoglobin has improved further up to 11.5.  We discussed that this point he has responded well to IV iron with significant improvement in his hemoglobin at this time he is not a candidate for Procrit.  We discussed that if his hemoglobin were to drop below 10 we could then proceed with Procrit injections.  We would tentatively plan to give these  every 2 weeks as needed.  For now considering the covert pandemic and the desire to keep him away from the office is much as possible I think it is reasonable to wait 6 weeks before bringing him back and he is agreeable to this.  I did encourage him to call should he develop worsening symptoms such as worsening fatigue or shortness of breath.  He verbalized understanding.    2.  History of CKD, stage III.  Followed by nephrology, Dr. Nima Huddleston.      PLAN:  1. Per Dr. Hoyos, patient to continue oral iron 3 times a day as tolerated.  2. Continue oral folic acid and B12.  3. Continue anticoagulation with Coumadin.  4. Continue follow-up with Dr. Nima Huddleston for CKD.  5. Patient will return in 6 weeks for follow-up with Dr. Hoyos, with CBC, CMP, ferritin and iron profile.  6. Continue taking stool softener only as needed for his constipation.    CC: Jyoti Reynolds M.D.      Florencia Caballero M.D.   Amber Murguia M.D.   Jerome Diallo M.D.   Nima Huddleston M.D.

## 2020-05-02 ENCOUNTER — HOSPITAL ENCOUNTER (EMERGENCY)
Facility: HOSPITAL | Age: 65
Discharge: HOME | End: 2020-05-02
Attending: EMERGENCY MEDICINE | Admitting: EMERGENCY MEDICINE

## 2020-05-02 ENCOUNTER — APPOINTMENT (OUTPATIENT)
Dept: GENERAL RADIOLOGY | Facility: HOSPITAL | Age: 65
End: 2020-05-02

## 2020-05-02 VITALS
RESPIRATION RATE: 14 BRPM | WEIGHT: 177 LBS | HEIGHT: 71 IN | DIASTOLIC BLOOD PRESSURE: 74 MMHG | HEART RATE: 80 BPM | BODY MASS INDEX: 24.78 KG/M2 | OXYGEN SATURATION: 99 % | SYSTOLIC BLOOD PRESSURE: 127 MMHG | TEMPERATURE: 98.3 F

## 2020-05-02 DIAGNOSIS — R06.02 SHORTNESS OF BREATH: Primary | ICD-10-CM

## 2020-05-02 DIAGNOSIS — M25.511 ACUTE PAIN OF RIGHT SHOULDER: ICD-10-CM

## 2020-05-02 LAB
ALBUMIN SERPL-MCNC: 3.3 G/DL (ref 3.5–5.2)
ALBUMIN/GLOB SERPL: 1.2 G/DL
ALP SERPL-CCNC: 69 U/L (ref 39–117)
ALT SERPL W P-5'-P-CCNC: 35 U/L (ref 1–41)
ANION GAP SERPL CALCULATED.3IONS-SCNC: 11.6 MMOL/L (ref 5–15)
AST SERPL-CCNC: 24 U/L (ref 1–40)
BASOPHILS # BLD AUTO: 0.01 10*3/MM3 (ref 0–0.2)
BASOPHILS NFR BLD AUTO: 0.2 % (ref 0–1.5)
BILIRUB SERPL-MCNC: 0.3 MG/DL (ref 0.2–1.2)
BUN BLD-MCNC: 36 MG/DL (ref 8–23)
BUN/CREAT SERPL: 19.6 (ref 7–25)
CALCIUM SPEC-SCNC: 8.7 MG/DL (ref 8.6–10.5)
CHLORIDE SERPL-SCNC: 104 MMOL/L (ref 98–107)
CO2 SERPL-SCNC: 24.4 MMOL/L (ref 22–29)
CREAT BLD-MCNC: 1.84 MG/DL (ref 0.76–1.27)
DEPRECATED RDW RBC AUTO: 44.9 FL (ref 37–54)
EOSINOPHIL # BLD AUTO: 0.13 10*3/MM3 (ref 0–0.4)
EOSINOPHIL NFR BLD AUTO: 2.3 % (ref 0.3–6.2)
ERYTHROCYTE [DISTWIDTH] IN BLOOD BY AUTOMATED COUNT: 14.7 % (ref 12.3–15.4)
GFR SERPL CREATININE-BSD FRML MDRD: 37 ML/MIN/1.73
GLOBULIN UR ELPH-MCNC: 2.7 GM/DL
GLUCOSE BLD-MCNC: 185 MG/DL (ref 65–99)
HCT VFR BLD AUTO: 31.6 % (ref 37.5–51)
HGB BLD-MCNC: 10.3 G/DL (ref 13–17.7)
IMM GRANULOCYTES # BLD AUTO: 0.02 10*3/MM3 (ref 0–0.05)
IMM GRANULOCYTES NFR BLD AUTO: 0.4 % (ref 0–0.5)
INR PPP: 2.42 (ref 0.9–1.1)
LYMPHOCYTES # BLD AUTO: 1.09 10*3/MM3 (ref 0.7–3.1)
LYMPHOCYTES NFR BLD AUTO: 19.2 % (ref 19.6–45.3)
MCH RBC QN AUTO: 27 PG (ref 26.6–33)
MCHC RBC AUTO-ENTMCNC: 32.6 G/DL (ref 31.5–35.7)
MCV RBC AUTO: 82.9 FL (ref 79–97)
MONOCYTES # BLD AUTO: 0.47 10*3/MM3 (ref 0.1–0.9)
MONOCYTES NFR BLD AUTO: 8.3 % (ref 5–12)
NEUTROPHILS # BLD AUTO: 3.95 10*3/MM3 (ref 1.7–7)
NEUTROPHILS NFR BLD AUTO: 69.6 % (ref 42.7–76)
NRBC BLD AUTO-RTO: 0 /100 WBC (ref 0–0.2)
NT-PROBNP SERPL-MCNC: 1533 PG/ML (ref 5–900)
PLATELET # BLD AUTO: 197 10*3/MM3 (ref 140–450)
PMV BLD AUTO: 10.3 FL (ref 6–12)
POTASSIUM BLD-SCNC: 3.9 MMOL/L (ref 3.5–5.2)
PROT SERPL-MCNC: 6 G/DL (ref 6–8.5)
PROTHROMBIN TIME: 26.1 SECONDS (ref 11.7–14.2)
RBC # BLD AUTO: 3.81 10*6/MM3 (ref 4.14–5.8)
SODIUM BLD-SCNC: 140 MMOL/L (ref 136–145)
TROPONIN T SERPL-MCNC: 0.09 NG/ML (ref 0–0.03)
WBC NRBC COR # BLD: 5.67 10*3/MM3 (ref 3.4–10.8)

## 2020-05-02 PROCEDURE — 99283 EMERGENCY DEPT VISIT LOW MDM: CPT

## 2020-05-02 PROCEDURE — 99284 EMERGENCY DEPT VISIT MOD MDM: CPT

## 2020-05-02 PROCEDURE — 80053 COMPREHEN METABOLIC PANEL: CPT | Performed by: EMERGENCY MEDICINE

## 2020-05-02 PROCEDURE — 93005 ELECTROCARDIOGRAM TRACING: CPT | Performed by: EMERGENCY MEDICINE

## 2020-05-02 PROCEDURE — 83880 ASSAY OF NATRIURETIC PEPTIDE: CPT | Performed by: EMERGENCY MEDICINE

## 2020-05-02 PROCEDURE — 25010000002 KETOROLAC TROMETHAMINE PER 15 MG: Performed by: EMERGENCY MEDICINE

## 2020-05-02 PROCEDURE — 71045 X-RAY EXAM CHEST 1 VIEW: CPT

## 2020-05-02 PROCEDURE — 85610 PROTHROMBIN TIME: CPT | Performed by: EMERGENCY MEDICINE

## 2020-05-02 PROCEDURE — 85025 COMPLETE CBC W/AUTO DIFF WBC: CPT | Performed by: EMERGENCY MEDICINE

## 2020-05-02 PROCEDURE — 96372 THER/PROPH/DIAG INJ SC/IM: CPT

## 2020-05-02 PROCEDURE — 84484 ASSAY OF TROPONIN QUANT: CPT | Performed by: EMERGENCY MEDICINE

## 2020-05-02 PROCEDURE — 93010 ELECTROCARDIOGRAM REPORT: CPT | Performed by: INTERNAL MEDICINE

## 2020-05-02 PROCEDURE — 96374 THER/PROPH/DIAG INJ IV PUSH: CPT

## 2020-05-02 RX ORDER — SODIUM CHLORIDE 0.9 % (FLUSH) 0.9 %
10 SYRINGE (ML) INJECTION AS NEEDED
Status: DISCONTINUED | OUTPATIENT
Start: 2020-05-02 | End: 2020-05-02 | Stop reason: HOSPADM

## 2020-05-02 RX ORDER — HYDROCODONE BITARTRATE AND ACETAMINOPHEN 5; 325 MG/1; MG/1
1 TABLET ORAL ONCE
Status: COMPLETED | OUTPATIENT
Start: 2020-05-02 | End: 2020-05-02

## 2020-05-02 RX ORDER — KETOROLAC TROMETHAMINE 30 MG/ML
30 INJECTION, SOLUTION INTRAMUSCULAR; INTRAVENOUS ONCE
Status: COMPLETED | OUTPATIENT
Start: 2020-05-02 | End: 2020-05-02

## 2020-05-02 RX ADMIN — KETOROLAC TROMETHAMINE 30 MG: 30 INJECTION, SOLUTION INTRAMUSCULAR at 16:43

## 2020-05-02 RX ADMIN — HYDROCODONE BITARTRATE AND ACETAMINOPHEN 1 TABLET: 5; 325 TABLET ORAL at 15:38

## 2020-05-02 NOTE — ED PROVIDER NOTES
EMERGENCY DEPARTMENT ENCOUNTER    Room Number:  15/15  Date seen:  5/2/2020  Time seen: 3:23 PM  PCP: Florencia Caballero MD  Historian: Patient      HPI:  Chief Complaint: Right shoulder pain, short of breath  A complete HPI/ROS/PMH/PSH/SH/FH are unobtainable due to: Nothing  Context: Carlito Mo is a 64 y.o. male who presents to the ED c/o right shoulder pain and shortness of breath.  He reports he had the right shoulder pain yesterday went to urgent care where he was reportedly diagnosed with pneumonia.  He was prescribed cefdinir and also albuterol.  He denies fever or chills.  He has had no cough.  He does report some shortness of breath.  He reports that he thinks he strained something causing his right shoulder pain.  He has had no chest pain other than some chronic pain he has in his old sternotomy incision site from CABG surgery 9 years ago.  He has had some nausea and vomiting as well.  He denies abdominal pain.  He has taken Tylenol for the shoulder pain without improvement.            PAST MEDICAL HISTORY  Active Ambulatory Problems     Diagnosis Date Noted   • Major depressive disorder with single episode, in partial remission (CMS/[facility])    • Other hyperlipidemia    • Chronic ischemic heart disease    • Vitamin D deficiency 12/17/2015   • Erectile dysfunction 12/17/2015   • Chronic fatigue 04/25/2016   • Type 2 diabetes mellitus with ophthalmic complication (CMS/[facility]) 04/25/2016   • Skin lesion of chest wall 06/20/2016   • Slow transit constipation 09/01/2016   • Benign prostatic hyperplasia with nocturia 12/20/2016   • CKD (chronic kidney disease) stage 3, GFR 30-59 ml/min (CMS/[facility]) 12/20/2016   • History of basal cell carcinoma 12/20/2016   • High blood pressure associated with diabetes (CMS/[facility]) 12/20/2016   • Acquired hypothyroidism 12/20/2017   • Recurrent strokes (CMS/[facility]) 02/20/2018   • Urinary retention 09/20/2018   • SYLVAIN (acute kidney injury) (CMS/[facility]) 09/20/2018   • Pneumonia 09/21/2018   •  Chronic combined systolic and diastolic congestive heart failure (CMS/[facility]) 09/21/2018   • Acute respiratory failure with hypoxia (CMS/[facility]) 09/21/2018   • Suspected sleep apnea 10/29/2018   • Pleural effusion 12/01/2018   • Chronic CHF (congestive heart failure) (CMS/[facility]) 12/12/2018   • YAW on CPAP 12/13/2018   • Coronary artery disease involving native coronary artery of native heart without angina pectoris 04/18/2019   • Coronary artery disease 04/23/2019   • Acute on chronic congestive heart failure (CMS/[facility]) 07/02/2019   • Elevated troponin 07/02/2019   • Colon cancer screening 10/30/2018   • Enlarged lymph nodes 10/30/2018   • Functional gait abnormality 10/30/2018   • History of myocardial infarction 02/06/2019   • Hospital discharge follow-up 05/31/2019   • Insomnia 02/06/2019   • Iron deficiency anemia 10/02/2018   • Major depression, recurrent (CMS/[facility]) 10/16/2012   • Coronary artery disease involving native coronary artery of native heart without angina pectoris 10/16/2012   • Anemia, chronic renal failure, stage 3 (moderate) (CMS/[facility]) 02/27/2020   • Essential hypertension 03/10/2020     Resolved Ambulatory Problems     Diagnosis Date Noted   • Anemia    • Arthritis    • Diabetes mellitus out of control (CMS/[facility])    • Type 2 diabetes mellitus (CMS/[facility])    • Acute sinusitis    • Accumulation of fluid in tissues 12/17/2015   • Fatigue 12/17/2015   • Cardiomyopathy, ischemic 12/17/2015   • Acute appendicitis 03/02/2016   • Atherosclerosis of coronary artery 10/16/2012   • Altered mental status 11/30/2017   • Hypertensive urgency 02/16/2018   • Hypertensive encephalopathy syndrome 04/30/2018   • History of medication noncompliance 05/04/2018   • Hyperglycemia 05/04/2018   • Screening for colorectal cancer 08/22/2018   • Constipation 08/22/2018   • Snoring 12/13/2018     Past Medical History:   Diagnosis Date   • Acute congestive heart failure (CMS/[facility])    • CAD (coronary artery disease)    • Cancer (CMS/[facility])     • CKD (chronic kidney disease)    • COPD (chronic obstructive pulmonary disease) (CMS/HCC)    • Depression    • Diabetes mellitus type 2, uncontrolled, without complications (CMS/HCC)    • Disease of thyroid gland    • Frequent PVCs    • Heart attack (CMS/HCC)    • History of coronary artery disease    • History of transfusion    • Hyperlipidemia    • Hypertension    • Hypertensive encephalopathy    • Mental status change    • RBBB (right bundle branch block)    • Screening for prostate cancer    • Stroke (CMS/HCC)    • TIA (transient ischemic attack)          PAST SURGICAL HISTORY  Past Surgical History:   Procedure Laterality Date   • APPENDECTOMY N/A 2016    Dr. Alexey Dodson   • COLONOSCOPY      over 20 years ago.  no polyps    • COLONOSCOPY N/A 2018    Procedure: COLONOSCOPY;  Surgeon: Jose Enrique Louie MD;  Location: [facility] ENDOSCOPY;  Service: Gastroenterology   • COLONOSCOPY N/A 2018    IH, EH, polyps (TAs w/low grade dysplasia)   • CORONARY ARTERY BYPASS GRAFT  2001   • HERNIA REPAIR Left 2019   • TONSILLECTOMY     • VASECTOMY           FAMILY HISTORY  Family History   Problem Relation Age of Onset   • Diabetes Mother    • Hypertension Mother    • Macular degeneration Mother    • Alcohol abuse Father    • Cancer Father         lung,  age 65   • Heart disease Father    • Alcohol abuse Brother    • Cirrhosis Brother          age 50   • Liver cancer Brother 45   • Diabetes Son    • Kidney failure Son    • Autism Son          SOCIAL HISTORY  Social History     Socioeconomic History   • Marital status:      Spouse name: Lissette   • Number of children: 3   • Years of education: college   • Highest education level: Not on file   Occupational History     Employer: RETIRED   Tobacco Use   • Smoking status: Never Smoker   • Smokeless tobacco: Never Used   • Tobacco comment: daily caffeine   Substance and Sexual Activity   • Alcohol use: No   • Drug use: No   •  Sexual activity: Defer         ALLERGIES  Fluoxetine and Prozac [fluoxetine hcl]        REVIEW OF SYSTEMS  Review of Systems   Constitutional: Negative for diaphoresis and fever.   HENT: Negative for congestion.    Eyes: Negative for visual disturbance.   Respiratory: Positive for shortness of breath. Negative for cough.    Cardiovascular: Negative for palpitations.   Gastrointestinal: Positive for nausea and vomiting. Negative for blood in stool.   Endocrine: Negative for polyuria.   Genitourinary: Negative for flank pain.   Musculoskeletal: Positive for arthralgias. Negative for joint swelling.   Skin: Negative for wound.   Neurological: Negative for seizures.   Hematological: Negative for adenopathy.   Psychiatric/Behavioral: Negative for sleep disturbance.            PHYSICAL EXAM  ED Triage Vitals [05/02/20 1422]   Temp Heart Rate Resp BP SpO2   98.3 °F (36.8 °C) 66 22 171/79 100 %      Temp src Heart Rate Source Patient Position BP Location FiO2 (%)   Oral -- -- -- --         GENERAL: Awake and alert, no acute distress  HENT: nares patent  EYES: no scleral icterus  CV: regular rhythm, normal rate, no murmur, old sternotomy scar  RESPIRATORY: normal effort, lungs clear to auscultation bilaterally  ABDOMEN: soft, nondistended, nontender throughout  MUSCULOSKELETAL: no deformity, mild point tenderness over the right anterior deltoid.  2+ right radial pulse.  NEURO: alert, moves all extremities, follows commands  SKIN: warm, dry    Vital signs and nursing notes reviewed.          LAB RESULTS  Recent Results (from the past 24 hour(s))   Protime-INR    Collection Time: 05/02/20  3:12 PM   Result Value Ref Range    Protime 26.1 (H) 11.7 - 14.2 Seconds    INR 2.42 (H) 0.90 - 1.10   Comprehensive Metabolic Panel    Collection Time: 05/02/20  3:12 PM   Result Value Ref Range    Glucose 185 (H) 65 - 99 mg/dL    BUN 36 (H) 8 - 23 mg/dL    Creatinine 1.84 (H) 0.76 - 1.27 mg/dL    Sodium 140 136 - 145 mmol/L    Potassium  3.9 3.5 - 5.2 mmol/L    Chloride 104 98 - 107 mmol/L    CO2 24.4 22.0 - 29.0 mmol/L    Calcium 8.7 8.6 - 10.5 mg/dL    Total Protein 6.0 6.0 - 8.5 g/dL    Albumin 3.30 (L) 3.50 - 5.20 g/dL    ALT (SGPT) 35 1 - 41 U/L    AST (SGOT) 24 1 - 40 U/L    Alkaline Phosphatase 69 39 - 117 U/L    Total Bilirubin 0.3 0.2 - 1.2 mg/dL    eGFR Non African Amer 37 (L) >60 mL/min/1.73    Globulin 2.7 gm/dL    A/G Ratio 1.2 g/dL    BUN/Creatinine Ratio 19.6 7.0 - 25.0    Anion Gap 11.6 5.0 - 15.0 mmol/L   BNP    Collection Time: 05/02/20  3:12 PM   Result Value Ref Range    proBNP 1,533.0 (H) 5.0 - 900.0 pg/mL   Troponin    Collection Time: 05/02/20  3:12 PM   Result Value Ref Range    Troponin T 0.089 (C) 0.000 - 0.030 ng/mL   CBC Auto Differential    Collection Time: 05/02/20  3:12 PM   Result Value Ref Range    WBC 5.67 3.40 - 10.80 10*3/mm3    RBC 3.81 (L) 4.14 - 5.80 10*6/mm3    Hemoglobin 10.3 (L) 13.0 - 17.7 g/dL    Hematocrit 31.6 (L) 37.5 - 51.0 %    MCV 82.9 79.0 - 97.0 fL    MCH 27.0 26.6 - 33.0 pg    MCHC 32.6 31.5 - 35.7 g/dL    RDW 14.7 12.3 - 15.4 %    RDW-SD 44.9 37.0 - 54.0 fl    MPV 10.3 6.0 - 12.0 fL    Platelets 197 140 - 450 10*3/mm3    Neutrophil % 69.6 42.7 - 76.0 %    Lymphocyte % 19.2 (L) 19.6 - 45.3 %    Monocyte % 8.3 5.0 - 12.0 %    Eosinophil % 2.3 0.3 - 6.2 %    Basophil % 0.2 0.0 - 1.5 %    Immature Grans % 0.4 0.0 - 0.5 %    Neutrophils, Absolute 3.95 1.70 - 7.00 10*3/mm3    Lymphocytes, Absolute 1.09 0.70 - 3.10 10*3/mm3    Monocytes, Absolute 0.47 0.10 - 0.90 10*3/mm3    Eosinophils, Absolute 0.13 0.00 - 0.40 10*3/mm3    Basophils, Absolute 0.01 0.00 - 0.20 10*3/mm3    Immature Grans, Absolute 0.02 0.00 - 0.05 10*3/mm3    nRBC 0.0 0.0 - 0.2 /100 WBC       Ordered the above labs and reviewed the results.        RADIOLOGY  Xr Chest 1 View    Result Date: 5/2/2020  PORTABLE CHEST X-RAY  HISTORY: Shortness of breath.  Portable frontal chest x-ray is provided and correlated chest x-ray 07/02/2019 and  04/23/2019.  FINDINGS: There are sternal wires and mediastinal clips. The cardiomediastinal silhouette is normal. The lungs are clear and the costophrenic sulci are dry. Vascular volume is normal.      Prior sternotomy and coronary bypass. No acute normality is present.  This report was finalized on 5/2/2020 3:33 PM by Dr. Klever Guerrero M.D.        Ordered the above noted radiological studies. Reviewed by me in PACS.            PROCEDURES  Procedures            MEDICATIONS GIVEN IN ER  Medications   HYDROcodone-acetaminophen (NORCO) 5-325 MG per tablet 1 tablet (1 tablet Oral Given 5/2/20 1538)   ketorolac (TORADOL) injection 30 mg (30 mg Intramuscular Given 5/2/20 1643)                   MEDICAL DECISION MAKING, PROGRESS, and CONSULTS    ED Course as of May 02 2234   Sat May 02, 2020   1512 Echo reviewed from 12-18 which showed slightly decreased LV systolic function with EF 43%.  There is also severe hypokinesis of the distal anterior septum and anterior apex.  Patient has a history of congestive heart failure, diabetes, hyperlipidemia, hypertension, right bundle branch block, coronary artery disease.  I see no prior cath report available for review.  It appears she had a stress test with myocardial perfusion 4/24/2019 there was a medium size infarct located in the apex with no significant ischemia noted.  EF reported at 44% at that time.    [JR]   1542 EKG          EKG time: 1535  Rhythm/Rate: Sinus rhythm, 61  P waves and DC: Short DC  QRS, axis: RBBB and LAFB  ST and T waves: No acute ischemic changes    Interpreted Contemporaneously by me, independently viewed  Similar compared to prior 7/2/2019          [JR]   1613 Appears chronically elevated   Troponin T(!!): 0.089 [JR]   1614 Stable compared to prior   Creatinine(!): 1.84 [JR]   1614 INR(!): 2.42 [JR]   1614 stable   Hemoglobin(!): 10.3 [JR]   1614 CXR reviewed and clear.     [JR]   1616 Patient has a chronically elevated troponin as was documented  during his cardiology office visit on 3/10/2020.  I do not suspect acute coronary syndrome today.    [JR]   1616 He has no hypoxia.  He has baseline renal insufficiency, chronic anemia, clear chest x-ray, therapeutic INR of 2.42, and chronically elevated troponin.  His right shoulder pain is reproducible with palpation and therefore not likely suggestive of an anginal equivalent.  He has no indication for admission at this time.  He is appropriate for discharge home with PCP follow-up.    [JR]   1617 Given he is afebrile and has a normal chest x-ray, I do not suspect COVID at this time.    [JR]      ED Course User Index  [JR] Wilner Gillis MD       MDM       Patient was placed in face mask in first look. Patient was wearing facemask when I entered the room and throughout our encounter. I wore full protective equipment throughout this patient encounter including a face mask, eye shield, gown and gloves. Hand hygiene was performed before donning protective equipment and after removal when leaving the room.      DIAGNOSIS  Final diagnoses:   Shortness of breath   Acute pain of right shoulder         DISPOSITION  DISCHARGE    Patient discharged in stable condition.    Reviewed implications of results, diagnosis, meds, responsibility to follow up, warning signs and symptoms of possible worsening, potential complications and reasons to return to ER.    Patient/Family voiced understanding of above instructions.    Discussed plan for discharge, as there is no emergent indication for admission. Patient referred to primary care provider for BP management due to today's BP. Pt/family is agreeable and understands need for follow up and repeat testing.  Pt is aware that discharge does not mean that nothing is wrong but it indicates no emergency is present that requires admission and they must continue care with follow-up as given below or physician of their choice.     FOLLOW-UP  Florencia Caballero MD  [address] [facility]  [place] [address]  [phone]    Schedule an appointment as soon as possible for a visit            Medication List      No changes were made to your prescriptions during this visit.                   Latest Documented Vital Signs:  As of 22:34  BP- 127/74 HR- 80 Temp- 98.3 °F (36.8 °C) (Oral) O2 sat- 99%        --    Please note that portions of this were completed with a voice recognition program.          Wilner Gillis MD  05/02/20 [id number]

## 2020-05-02 NOTE — ED TRIAGE NOTES
Ems reports pt was at urgent care yesterday and was diagnosed with Pneumonia. Pt reports right sided  Shoulder pain and upper abdominal pain when taking a deep breath.   Patient placed in a mask at triage. Triage RN also wearing a mask.

## 2020-05-19 ENCOUNTER — APPOINTMENT (OUTPATIENT)
Dept: ONCOLOGY | Facility: HOSPITAL | Age: 65
End: 2020-05-19

## 2020-05-19 ENCOUNTER — TELEMEDICINE (OUTPATIENT)
Dept: ONCOLOGY | Facility: CLINIC | Age: 65
End: 2020-05-19

## 2020-05-19 ENCOUNTER — APPOINTMENT (OUTPATIENT)
Dept: LAB | Facility: HOSPITAL | Age: 65
End: 2020-05-19

## 2020-05-19 DIAGNOSIS — T45.4X5A ADVERSE EFFECT OF IRON AND ITS COMPOUNDS, INITIAL ENCOUNTER: ICD-10-CM

## 2020-05-19 DIAGNOSIS — D63.1 ANEMIA, CHRONIC RENAL FAILURE, STAGE 3 (MODERATE) (HCC): Primary | ICD-10-CM

## 2020-05-19 DIAGNOSIS — N18.30 ANEMIA, CHRONIC RENAL FAILURE, STAGE 3 (MODERATE) (HCC): Primary | ICD-10-CM

## 2020-05-19 DIAGNOSIS — D50.9 IRON DEFICIENCY ANEMIA, UNSPECIFIED IRON DEFICIENCY ANEMIA TYPE: ICD-10-CM

## 2020-05-19 PROCEDURE — 99213 OFFICE O/P EST LOW 20 MIN: CPT | Performed by: INTERNAL MEDICINE

## 2020-05-19 NOTE — PROGRESS NOTES
REASON FOR FOLLOW-UP:     1. Multifactorial anemia in the setting of stage III CKD and iron deficiency.  Patient is on oral iron supplement.   2. Patient was given Injectafer 2 doses in March 2020.    HISTORY OF PRESENT ILLNESS:  The patient is a 64 y.o. male with a history of chronic anemia in the setting of CKD and iron deficiency.      Patient reports he has been taking oral iron 3 times a day, with significant constipation.  He also takes oral folic acid and vitamin B12.    The patient received IV Injectafer x 2 doses in early March and repeat iron studies done 3/26/2020 showed improvement in iron saturation of 24% and ferritin 860 ng/mL.    His hemoglobin improved to 11.5 on 4/7/2020.    Recently on 5/2/2020 his hemoglobin drops at 10.3 with MCV 82.9, MCHC 32.6, normal platelets 197,000 and a WBC 5670.      Patient takes Coumadin, his INR was therapeutic at 2.42 on 5/2/2020.    Patient reports no abnormal bleeding.      Past Medical History:   Diagnosis Date   • Acquired hypothyroidism    • Acute congestive heart failure (CMS/HCC)    • Acute respiratory failure with hypoxia (CMS/HCC)    • Acute sinusitis    • SYLVAIN (acute kidney injury) (CMS/HCC)     on CKD   • Anemia    • Arthritis    • CAD (coronary artery disease)    • Cancer (CMS/HCC)     skin   • Chronic combined systolic and diastolic congestive heart failure (CMS/HCC)    • Chronic ischemic heart disease    • CKD (chronic kidney disease)    • COPD (chronic obstructive pulmonary disease) (CMS/HCC)    • Depression    • Diabetes mellitus type 2, uncontrolled, without complications (CMS/HCC)    • Disease of thyroid gland    • Fatigue    • Frequent PVCs    • Heart attack (CMS/HCC)    • History of coronary artery disease     with remote history of bypass surgery in 2001   • History of transfusion    • Hyperglycemia    • Hyperlipidemia    • Hypertension    • Hypertensive encephalopathy    • Mental status change     Acute   • YAW on CPAP    • Pleural effusion     • Pneumonia    • RBBB (right bundle branch block)    • Screening for prostate cancer 2011   • Stroke (CMS/HCC)    • TIA (transient ischemic attack)    • Type 2 diabetes mellitus (CMS/HCC)    • Urinary retention    • Vitamin D deficiency      Past Surgical History:   Procedure Laterality Date   • APPENDECTOMY N/A 02/14/2016    Dr. Alexey Dodson   • COLONOSCOPY      over 20 years ago.  no polyps    • COLONOSCOPY N/A 9/18/2018    Procedure: COLONOSCOPY;  Surgeon: Jose Enrique Louie MD;  Location: [facility] ENDOSCOPY;  Service: Gastroenterology   • COLONOSCOPY N/A 9/19/2018    IH, EH, polyps (TAs w/low grade dysplasia)   • CORONARY ARTERY BYPASS GRAFT  11/2001   • HERNIA REPAIR Left 12/05/2019   • TONSILLECTOMY     • VASECTOMY       ONCOLOGIC/HEMATOLOGIC HISTORY:  history of hypertension, congestive heart failure, diabetes who presents today for initial evaluation of chronic anemia.  The patient presents by himself today.    He notes that he was officially diagnosed with anemia about 6 months ago.  He was started on oral ferrous sulfate 325 mg twice a day at that time, but this was increased to three times a day about a month ago.  He reports having very dark and solid stools and some constipation since he started taking the oral iron.  He reports taking a stool softener only as needed for his constipation.     He is not taking any vitamin supplements at this time.      Today, he reports feeling very exhausted, especially with taking care of his son with Autism.  He also reports difficulty sleeping at night due to this.  He also reports occasional mediastinal chest pain, but he denies any abdominal pain.    The patient is anticoagulated on Coumadin due to his history of recurrent strokes.  He denies any abnormal bleeding, such as melena or hematochezia.  He reports he has had chronic leg swelling since he had coronary bypass surgery about 9 years ago.  However, he denies history of a prior DVT/PE.     He has  history of stage III CKD, and his nephrologist is Dr. Nima Huddleston.    He also has history of diabetes mellitus, and his glucose level is typically around the 150's.    The patient denies any history of alcohol or tobacco use.  He lives at home with both of his sons, and he reports that his son with Autism smokes cigarettes.  He reports his other son is on dialysis.    Per our records, the patient has had persistent anemia since August 2015.  I have reviewed laboratory studies from 8/20/2015, he had hemoglobin 12.5.  His hemoglobin electrodes over time, and most recently has been less than 10 g since 11/20/2019.  He is laboratory study on 10/8/2018 reported ferritin 43.3 ng/mL, free iron 45 TIBC 295 and iron saturation 15%.  His vitamin B12 level was 495 pg/mL.  His hemoglobin was 11.6 MCV 77.9 MCHC 31.9, with WBC 6100 and platelets 384,000 10/2/2018.     On 7/4/2019, had a repeat laboratory study reporting iron saturation 7%, free iron 23 and a TIBC 349, without a ferritin level.  Serum folate was 9.2 ng/mL and vitamin B12 at 416 pg/mL.  His hemoglobin today was 10.5, MCV 78.9 in the normal platelets 293,000 WBC 7900.  Patient has stage III chronic and will insufficiency with most recent baseline creatinine 1.8 on 1/16/2020.    Laboratory studies 2/27/2020 show anemia hemoglobin 9.8 MCV 79.5, MCHC 32.3, platelets 231,000 and the WBC 6520.    Patient given IV Injectafer x2 doses on 3/5 and 3/12/2020.  Repeat iron studies 3/26/2020 with iron saturation of 24%, TIBC 274, iron 66, ferritin 860.  Hemoglobin improving up to 11.5 as of 4/7/2020.    MEDICATIONS    Current Outpatient Medications:   •  amLODIPine (NORVASC) 5 MG tablet, Take 5 mg by mouth Daily., Disp: , Rfl:   •  aspirin 81 MG EC tablet, Take 81 mg by mouth Every Morning., Disp: , Rfl:   •  atorvastatin (LIPITOR) 40 MG tablet, Take 40 mg by mouth Every Night., Disp: , Rfl:   •  buPROPion XL (WELLBUTRIN XL) 150 MG 24 hr tablet, Take 150 mg by mouth Daily.,  Disp: , Rfl:   •  carvedilol (COREG) 6.25 MG tablet, Take 6.25 mg by mouth 2 (Two) Times a Day With Meals., Disp: , Rfl:   •  Cholecalciferol (VITAMIN D3) 5000 units capsule capsule, Take 5,000 Units by mouth Daily., Disp: , Rfl:   •  ferrous sulfate 325 (65 FE) MG tablet, Take 325 mg by mouth 3 (Three) Times a Day With Meals., Disp: , Rfl:   •  folic acid (FOLVITE) 1 MG tablet, Take 1 tablet by mouth Daily., Disp: 90 tablet, Rfl: 3  •  furosemide (LASIX) 40 MG tablet, Take 1 tablet by mouth Daily., Disp: , Rfl:   •  Insulin Glargine (BASAGLAR KWIKPEN) 100 UNIT/ML injection pen, Inject 8 Units under the skin into the appropriate area as directed Every Night. Patient says he does not take consistently, Disp: , Rfl:   •  insulin glulisine (APIDRA) 100 UNIT/ML injection, Inject 2 Units under the skin into the appropriate area as directed 3 (Three) Times a Day Before Meals. Patient says he does not take consistently, Disp: , Rfl:   •  levothyroxine (SYNTHROID, LEVOTHROID) 75 MCG tablet, Take 75 mcg by mouth Daily., Disp: , Rfl:   •  lisinopril (PRINIVIL,ZESTRIL) 40 MG tablet, Take 40 mg by mouth Daily., Disp: , Rfl:   •  metoprolol succinate XL (TOPROL-XL) 25 MG 24 hr tablet, Take 1 tablet by mouth Daily., Disp: 30 tablet, Rfl: 11  •  tamsulosin (FLOMAX) 0.4 MG capsule 24 hr capsule, Take 1 capsule by mouth Every Night., Disp: , Rfl:   •  vitamin B-12 (CYANOCOBALAMIN) 1000 MCG tablet, Take 1 tablet by mouth Daily., Disp: 90 tablet, Rfl: 3  •  warfarin (COUMADIN) 2 MG tablet, Take 4 tablets (8 mg) by mouth every Mon and Thurs, and take 3 tablets (6 mg) by mouth all other days or as directed., Disp: 300 tablet, Rfl: 1    ALLERGIES:     Allergies   Allergen Reactions   • Fluoxetine Unknown - High Severity   • Prozac [Fluoxetine Hcl] Other (See Comments)     shaking       SOCIAL HISTORY:       Social History     Socioeconomic History   • Marital status:      Spouse name: Lissette   • Number of children: 3   • Years of  education: college   • Highest education level: Not on file   Occupational History     Employer: RETIRED   Tobacco Use   • Smoking status: Never Smoker   • Smokeless tobacco: Never Used   • Tobacco comment: daily caffeine   Substance and Sexual Activity   • Alcohol use: No   • Drug use: No   • Sexual activity: Defer   College education, retired  and .      FAMILY HISTORY:   Father  of lung cancer at age 65.  Mother  of accident from a fall at age of 93.  Brother was diagnosed of liver cancer at age 45, passed away at about age of 50, he also had liver cirrhosis.  Patient has 3 children, one his kidney failure on dialysis with diabetes.  Another son he has autism.  Eldest child is in good health condition.    Family History   Problem Relation Age of Onset   • Diabetes Mother    • Hypertension Mother    • Macular degeneration Mother    • Alcohol abuse Father    • Cancer Father         lung,  age 65   • Heart disease Father    • Alcohol abuse Brother    • Cirrhosis Brother          age 50   • Liver cancer Brother 45   • Diabetes Son    • Kidney failure Son    • Autism Son        REVIEW OF SYSTEMS:  Review of Systems   Constitutional: Positive for diaphoresis (Some night sweats), fatigue and unexpected weight change (Weight gain). Negative for chills and fever.   HENT: Positive for sore throat. Negative for hearing loss, nosebleeds and tinnitus.    Eyes: Positive for visual disturbance (Poor vision). Negative for pain.   Respiratory: Positive for shortness of breath (Exertional dyspnea). Negative for cough and wheezing.    Cardiovascular: Positive for chest pain (occasional) and leg swelling (chronic). Negative for palpitations.   Gastrointestinal: Positive for constipation (occasional). Negative for abdominal pain, blood in stool, diarrhea, nausea and vomiting.   Endocrine: Positive for cold intolerance. Negative for heat intolerance, polydipsia and polyuria.    Genitourinary: Positive for frequency. Negative for difficulty urinating, dysuria and hematuria.   Musculoskeletal: Positive for joint swelling (Ankle swelling). Negative for arthralgias and myalgias.   Skin: Negative for rash and wound.        Pruritus without skin rashes.   Allergic/Immunologic: Negative for environmental allergies.   Neurological: Positive for dizziness. Negative for syncope and numbness.   Hematological: Does not bruise/bleed easily.   Psychiatric/Behavioral: Positive for sleep disturbance (difficulty sleeping). Negative for dysphoric mood. The patient is not nervous/anxious.               There were no vitals filed for this visit.   This is a telemedicine, through video conference.  Limited to visual examination only.      PHYSICAL EXAM:    CONSTITUTIONAL: No distress, looks comfortable.  EYES:  Conjunctiva and lids unremarkable.    EARS,NOSE,MOUTH,THROAT:  Lips appear unremarkable.  RESPIRATORY:  Normal respiratory effort.    PSYCHIATRIC:  Normal judgment and insight.  Normal mood and affect.      RECENT LABS:    Lab Results   Component Value Date    NEUTROABS 3.95 05/02/2020      Lab Results   Component Value Date    WBC 5.67 05/02/2020    HGB 10.3 (L) 05/02/2020    HCT 31.6 (L) 05/02/2020    MCV 82.9 05/02/2020     05/02/2020     Lab Results   Component Value Date    GLUCOSE 185 (H) 05/02/2020    BUN 36 (H) 05/02/2020    CREATININE 1.84 (H) 05/02/2020    EGFRIFNONA 37 (L) 05/02/2020    EGFRIFAFRI 74 12/12/2017    BCR 19.6 05/02/2020    K 3.9 05/02/2020    CO2 24.4 05/02/2020    CALCIUM 8.7 05/02/2020    PROTENTOTREF 6.7 12/12/2017    ALBUMIN 3.30 (L) 05/02/2020    LABIL2 1.5 11/20/2019    AST 24 05/02/2020    ALT 35 05/02/2020        Lab Results   Component Value Date    IRON 71 04/07/2020    TIBC 265 04/07/2020    FERRITIN 741.80 (H) 04/07/2020   Iron saturation 27%.    Assessment/Plan     ASSESSMENT:  1.  Chronic anemia with evidence of iron deficiency in the setting of stage III  chronic renal insufficiency.    · Per our records, the patient has had persistent anemia since August 2015.  We have reviewed laboratory studies for the past 4 years, with documented iron deficiency.    · He was started on oral ferrous sulfate 325 mg twice daily about 6 months ago, but this was increased to three times a day as of January 2020. Laboratory studies today, 2/27/2020, show persistent anemia.  We will also check his iron studies today.  This patient likely has anemia with multiple factorial including iron deficiency, and the stage III renal insufficiency more other things.  He is a candidate for HAI such as Procrit injection, however iron deficiency needs correcting first.  Patient also started on oral B12 and folic acid.  · Patient receiving IV Injectafer x2 doses on 3/5 and 3/12/2020.   · Repeat iron studies 3/26/2020 showing repletion of iron with iron saturation of 24%, ferritin 860.  Hemoglobin up to 10.6.  · Patient reviewed 4/7/2020.  Hemoglobin has improved further up to 11.5.  We discussed that this point he has responded well to IV iron with significant improvement in his hemoglobin at this time he is not a candidate for Procrit.  We discussed that if his hemoglobin were to drop below 10 we could then proceed with Procrit injections.    · Hemoglobin decreased at 10.3 on 5/2/2020.    · On 5/19/2020 patient reports no bleeding.  He is on Coumadin anticoagulation.  Patient reports having significant constipation on oral iron 3 times a day.  We discussed and asked him to decrease to twice a day.    2.  History of CKD, stage III.  Followed by nephrology, Dr. Nima Huddleston.      PLAN:  1. Decrease oral iron 2 times a day.  2. Continue oral folic acid and B12 daily.  3. Continue anticoagulation with Coumadin.  4. Continue follow-up with Dr. Nima Huddleston for CKD.  5. Patient will return in 2 months for follow-up with me including labs CBC, CMP, ferritin and iron profile.  6. Continue taking stool  softener only as needed for his constipation.    This is a video visit to discuss his recent laboratory study results and treatment plan.  Due to the pandemic coronavirus infection, to decrease the risk for exposure, we arranged this video conference and the patient is agreeable.      BOOGIE CABRALES M.D., Ph.D.    5/19/2020      CC: Jyoti Reynolds M.D.      Florencia Caballero M.D.   Amber Murguia M.D.   Jerome Diallo M.D.   Nima Huddleston M.D.

## 2020-05-23 PROBLEM — T45.4X5A ADVERSE EFFECT OF IRON AND ITS COMPOUNDS, INITIAL ENCOUNTER: Status: ACTIVE | Noted: 2020-05-23

## 2020-05-25 ENCOUNTER — APPOINTMENT (OUTPATIENT)
Dept: GENERAL RADIOLOGY | Facility: HOSPITAL | Age: 65
End: 2020-05-25

## 2020-05-25 ENCOUNTER — HOSPITAL ENCOUNTER (INPATIENT)
Facility: HOSPITAL | Age: 65
LOS: 9 days | Discharge: HOME HEALTH | End: 2020-06-04
Attending: EMERGENCY MEDICINE | Admitting: INTERNAL MEDICINE

## 2020-05-25 ENCOUNTER — APPOINTMENT (OUTPATIENT)
Dept: CT IMAGING | Facility: HOSPITAL | Age: 65
End: 2020-05-25

## 2020-05-25 DIAGNOSIS — R19.5 HEME POSITIVE STOOL: ICD-10-CM

## 2020-05-25 DIAGNOSIS — G90.3 NEUROGENIC ORTHOSTATIC HYPOTENSION (HCC): ICD-10-CM

## 2020-05-25 DIAGNOSIS — N18.9 ACUTE ON CHRONIC RENAL INSUFFICIENCY: Primary | ICD-10-CM

## 2020-05-25 DIAGNOSIS — Z86.010 HISTORY OF COLON POLYPS: ICD-10-CM

## 2020-05-25 DIAGNOSIS — R53.1 GENERALIZED WEAKNESS: ICD-10-CM

## 2020-05-25 DIAGNOSIS — D64.9 ANEMIA: ICD-10-CM

## 2020-05-25 DIAGNOSIS — D63.1 ANEMIA, CHRONIC RENAL FAILURE, STAGE 3 (MODERATE) (HCC): ICD-10-CM

## 2020-05-25 DIAGNOSIS — R77.8 ELEVATED TROPONIN: ICD-10-CM

## 2020-05-25 DIAGNOSIS — S40.011A CONTUSION OF RIGHT SHOULDER, INITIAL ENCOUNTER: ICD-10-CM

## 2020-05-25 DIAGNOSIS — N28.9 ACUTE ON CHRONIC RENAL INSUFFICIENCY: Primary | ICD-10-CM

## 2020-05-25 DIAGNOSIS — S29.012A UPPER BACK STRAIN, INITIAL ENCOUNTER: ICD-10-CM

## 2020-05-25 DIAGNOSIS — R42 LIGHTHEADEDNESS: ICD-10-CM

## 2020-05-25 DIAGNOSIS — N18.30 ANEMIA, CHRONIC RENAL FAILURE, STAGE 3 (MODERATE) (HCC): ICD-10-CM

## 2020-05-25 DIAGNOSIS — D64.9 ACUTE ON CHRONIC ANEMIA: ICD-10-CM

## 2020-05-25 PROBLEM — E11.65 TYPE 2 DIABETES MELLITUS WITH HYPERGLYCEMIA: Status: ACTIVE | Noted: 2020-05-25

## 2020-05-25 PROBLEM — R29.6 FALLS FREQUENTLY: Status: ACTIVE | Noted: 2020-05-25

## 2020-05-25 LAB
ALBUMIN SERPL-MCNC: 3.7 G/DL (ref 3.5–5.2)
ALBUMIN/GLOB SERPL: 1.7 G/DL
ALP SERPL-CCNC: 72 U/L (ref 39–117)
ALT SERPL W P-5'-P-CCNC: 54 U/L (ref 1–41)
ANION GAP SERPL CALCULATED.3IONS-SCNC: 9.7 MMOL/L (ref 5–15)
AST SERPL-CCNC: 15 U/L (ref 1–40)
BACTERIA UR QL AUTO: NORMAL /HPF
BASOPHILS # BLD AUTO: 0.02 10*3/MM3 (ref 0–0.2)
BASOPHILS NFR BLD AUTO: 0.2 % (ref 0–1.5)
BILIRUB SERPL-MCNC: 0.3 MG/DL (ref 0.2–1.2)
BILIRUB UR QL STRIP: NEGATIVE
BUN BLD-MCNC: 60 MG/DL (ref 8–23)
BUN/CREAT SERPL: 23.2 (ref 7–25)
CALCIUM SPEC-SCNC: 8.8 MG/DL (ref 8.6–10.5)
CHLORIDE SERPL-SCNC: 99 MMOL/L (ref 98–107)
CLARITY UR: CLEAR
CO2 SERPL-SCNC: 25.3 MMOL/L (ref 22–29)
COLOR UR: YELLOW
CREAT BLD-MCNC: 2.59 MG/DL (ref 0.76–1.27)
DEPRECATED RDW RBC AUTO: 45.4 FL (ref 37–54)
EOSINOPHIL # BLD AUTO: 0.12 10*3/MM3 (ref 0–0.4)
EOSINOPHIL NFR BLD AUTO: 1.5 % (ref 0.3–6.2)
ERYTHROCYTE [DISTWIDTH] IN BLOOD BY AUTOMATED COUNT: 14.6 % (ref 12.3–15.4)
GFR SERPL CREATININE-BSD FRML MDRD: 25 ML/MIN/1.73
GLOBULIN UR ELPH-MCNC: 2.2 GM/DL
GLUCOSE BLD-MCNC: 398 MG/DL (ref 65–99)
GLUCOSE BLDC GLUCOMTR-MCNC: 439 MG/DL (ref 70–130)
GLUCOSE UR STRIP-MCNC: ABNORMAL MG/DL
HCT VFR BLD AUTO: 35.8 % (ref 37.5–51)
HGB BLD-MCNC: 11.5 G/DL (ref 13–17.7)
HGB UR QL STRIP.AUTO: NEGATIVE
HOLD SPECIMEN: NORMAL
HOLD SPECIMEN: NORMAL
HYALINE CASTS UR QL AUTO: NORMAL /LPF
IMM GRANULOCYTES # BLD AUTO: 0.07 10*3/MM3 (ref 0–0.05)
IMM GRANULOCYTES NFR BLD AUTO: 0.8 % (ref 0–0.5)
INR PPP: 1.68 (ref 0.9–1.1)
INR PPP: 1.69 (ref 0.9–1.1)
KETONES UR QL STRIP: NEGATIVE
LEUKOCYTE ESTERASE UR QL STRIP.AUTO: NEGATIVE
LYMPHOCYTES # BLD AUTO: 1.33 10*3/MM3 (ref 0.7–3.1)
LYMPHOCYTES NFR BLD AUTO: 16.1 % (ref 19.6–45.3)
MAGNESIUM SERPL-MCNC: 2.1 MG/DL (ref 1.6–2.4)
MCH RBC QN AUTO: 27.2 PG (ref 26.6–33)
MCHC RBC AUTO-ENTMCNC: 32.1 G/DL (ref 31.5–35.7)
MCV RBC AUTO: 84.6 FL (ref 79–97)
MONOCYTES # BLD AUTO: 0.68 10*3/MM3 (ref 0.1–0.9)
MONOCYTES NFR BLD AUTO: 8.2 % (ref 5–12)
NEUTROPHILS # BLD AUTO: 6.05 10*3/MM3 (ref 1.7–7)
NEUTROPHILS NFR BLD AUTO: 73.2 % (ref 42.7–76)
NITRITE UR QL STRIP: NEGATIVE
NRBC BLD AUTO-RTO: 0 /100 WBC (ref 0–0.2)
PH UR STRIP.AUTO: <=5 [PH] (ref 5–8)
PLATELET # BLD AUTO: 241 10*3/MM3 (ref 140–450)
PMV BLD AUTO: 10.5 FL (ref 6–12)
POTASSIUM BLD-SCNC: 4.7 MMOL/L (ref 3.5–5.2)
PROT SERPL-MCNC: 5.9 G/DL (ref 6–8.5)
PROT UR QL STRIP: ABNORMAL
PROTHROMBIN TIME: 19.5 SECONDS (ref 11.7–14.2)
PROTHROMBIN TIME: 19.5 SECONDS (ref 11.7–14.2)
RBC # BLD AUTO: 4.23 10*6/MM3 (ref 4.14–5.8)
RBC # UR: NORMAL /HPF
REF LAB TEST METHOD: NORMAL
SODIUM BLD-SCNC: 134 MMOL/L (ref 136–145)
SP GR UR STRIP: 1.02 (ref 1–1.03)
SQUAMOUS #/AREA URNS HPF: NORMAL /HPF
T4 FREE SERPL-MCNC: 1.67 NG/DL (ref 0.93–1.7)
TROPONIN T SERPL-MCNC: 0.1 NG/ML (ref 0–0.03)
TSH SERPL DL<=0.05 MIU/L-ACNC: 1.72 UIU/ML (ref 0.27–4.2)
UROBILINOGEN UR QL STRIP: ABNORMAL
WBC NRBC COR # BLD: 8.27 10*3/MM3 (ref 3.4–10.8)
WBC UR QL AUTO: NORMAL /HPF
WHOLE BLOOD HOLD SPECIMEN: NORMAL
WHOLE BLOOD HOLD SPECIMEN: NORMAL

## 2020-05-25 PROCEDURE — 82962 GLUCOSE BLOOD TEST: CPT

## 2020-05-25 PROCEDURE — 85610 PROTHROMBIN TIME: CPT | Performed by: EMERGENCY MEDICINE

## 2020-05-25 PROCEDURE — 85610 PROTHROMBIN TIME: CPT | Performed by: INTERNAL MEDICINE

## 2020-05-25 PROCEDURE — G0378 HOSPITAL OBSERVATION PER HR: HCPCS

## 2020-05-25 PROCEDURE — 73030 X-RAY EXAM OF SHOULDER: CPT

## 2020-05-25 PROCEDURE — 81001 URINALYSIS AUTO W/SCOPE: CPT | Performed by: EMERGENCY MEDICINE

## 2020-05-25 PROCEDURE — 72072 X-RAY EXAM THORAC SPINE 3VWS: CPT

## 2020-05-25 PROCEDURE — 84443 ASSAY THYROID STIM HORMONE: CPT | Performed by: EMERGENCY MEDICINE

## 2020-05-25 PROCEDURE — 84439 ASSAY OF FREE THYROXINE: CPT | Performed by: EMERGENCY MEDICINE

## 2020-05-25 PROCEDURE — 63710000001 INSULIN LISPRO (HUMAN) PER 5 UNITS: Performed by: INTERNAL MEDICINE

## 2020-05-25 PROCEDURE — 84484 ASSAY OF TROPONIN QUANT: CPT | Performed by: EMERGENCY MEDICINE

## 2020-05-25 PROCEDURE — 80053 COMPREHEN METABOLIC PANEL: CPT | Performed by: EMERGENCY MEDICINE

## 2020-05-25 PROCEDURE — 85025 COMPLETE CBC W/AUTO DIFF WBC: CPT | Performed by: EMERGENCY MEDICINE

## 2020-05-25 PROCEDURE — 93010 ELECTROCARDIOGRAM REPORT: CPT | Performed by: INTERNAL MEDICINE

## 2020-05-25 PROCEDURE — 83735 ASSAY OF MAGNESIUM: CPT | Performed by: EMERGENCY MEDICINE

## 2020-05-25 PROCEDURE — 99284 EMERGENCY DEPT VISIT MOD MDM: CPT

## 2020-05-25 PROCEDURE — 70450 CT HEAD/BRAIN W/O DYE: CPT

## 2020-05-25 PROCEDURE — 63710000001 INSULIN GLARGINE PER 5 UNITS: Performed by: INTERNAL MEDICINE

## 2020-05-25 PROCEDURE — 93005 ELECTROCARDIOGRAM TRACING: CPT | Performed by: EMERGENCY MEDICINE

## 2020-05-25 RX ORDER — NICOTINE POLACRILEX 4 MG
15 LOZENGE BUCCAL
Status: DISCONTINUED | OUTPATIENT
Start: 2020-05-25 | End: 2020-06-04 | Stop reason: HOSPADM

## 2020-05-25 RX ORDER — WARFARIN SODIUM 4 MG/1
8 TABLET ORAL
Status: DISCONTINUED | OUTPATIENT
Start: 2020-05-28 | End: 2020-05-27

## 2020-05-25 RX ORDER — SODIUM CHLORIDE 0.9 % (FLUSH) 0.9 %
10 SYRINGE (ML) INJECTION AS NEEDED
Status: DISCONTINUED | OUTPATIENT
Start: 2020-05-25 | End: 2020-05-27

## 2020-05-25 RX ORDER — INSULIN GLARGINE 100 [IU]/ML
12 INJECTION, SOLUTION SUBCUTANEOUS ONCE
Status: COMPLETED | OUTPATIENT
Start: 2020-05-25 | End: 2020-05-25

## 2020-05-25 RX ORDER — INSULIN GLARGINE 100 [IU]/ML
8 INJECTION, SOLUTION SUBCUTANEOUS NIGHTLY
Status: DISCONTINUED | OUTPATIENT
Start: 2020-05-25 | End: 2020-05-25 | Stop reason: CLARIF

## 2020-05-25 RX ORDER — WARFARIN SODIUM 1 MG/1
1 TABLET ORAL
Status: DISCONTINUED | OUTPATIENT
Start: 2020-05-25 | End: 2020-05-25

## 2020-05-25 RX ORDER — SODIUM CHLORIDE 0.9 % (FLUSH) 0.9 %
10 SYRINGE (ML) INJECTION EVERY 12 HOURS SCHEDULED
Status: DISCONTINUED | OUTPATIENT
Start: 2020-05-25 | End: 2020-05-27

## 2020-05-25 RX ORDER — FOLIC ACID 1 MG/1
1 TABLET ORAL DAILY
Status: DISCONTINUED | OUTPATIENT
Start: 2020-05-25 | End: 2020-06-04 | Stop reason: HOSPADM

## 2020-05-25 RX ORDER — TAMSULOSIN HYDROCHLORIDE 0.4 MG/1
0.4 CAPSULE ORAL NIGHTLY
Status: DISCONTINUED | OUTPATIENT
Start: 2020-05-25 | End: 2020-06-04 | Stop reason: HOSPADM

## 2020-05-25 RX ORDER — SODIUM CHLORIDE 0.9 % (FLUSH) 0.9 %
10 SYRINGE (ML) INJECTION AS NEEDED
Status: DISCONTINUED | OUTPATIENT
Start: 2020-05-25 | End: 2020-06-04 | Stop reason: HOSPADM

## 2020-05-25 RX ORDER — MELATONIN
1000 DAILY
Status: DISCONTINUED | OUTPATIENT
Start: 2020-05-25 | End: 2020-06-04 | Stop reason: HOSPADM

## 2020-05-25 RX ORDER — LEVOTHYROXINE SODIUM 0.07 MG/1
75 TABLET ORAL DAILY
Status: DISCONTINUED | OUTPATIENT
Start: 2020-05-25 | End: 2020-06-02

## 2020-05-25 RX ORDER — DEXTROSE MONOHYDRATE 25 G/50ML
25 INJECTION, SOLUTION INTRAVENOUS
Status: DISCONTINUED | OUTPATIENT
Start: 2020-05-25 | End: 2020-06-04 | Stop reason: HOSPADM

## 2020-05-25 RX ORDER — CHOLECALCIFEROL (VITAMIN D3) 125 MCG
1000 CAPSULE ORAL DAILY
Status: DISCONTINUED | OUTPATIENT
Start: 2020-05-25 | End: 2020-06-04 | Stop reason: HOSPADM

## 2020-05-25 RX ORDER — CARVEDILOL 6.25 MG/1
6.25 TABLET ORAL 2 TIMES DAILY WITH MEALS
Status: DISCONTINUED | OUTPATIENT
Start: 2020-05-25 | End: 2020-05-27

## 2020-05-25 RX ORDER — ASPIRIN 325 MG
325 TABLET ORAL ONCE
Status: COMPLETED | OUTPATIENT
Start: 2020-05-25 | End: 2020-05-25

## 2020-05-25 RX ORDER — WARFARIN SODIUM 6 MG/1
6 TABLET ORAL
Status: DISCONTINUED | OUTPATIENT
Start: 2020-05-26 | End: 2020-05-27

## 2020-05-25 RX ORDER — AMLODIPINE BESYLATE 5 MG/1
5 TABLET ORAL DAILY
Status: DISCONTINUED | OUTPATIENT
Start: 2020-05-25 | End: 2020-05-28

## 2020-05-25 RX ORDER — ONDANSETRON 2 MG/ML
4 INJECTION INTRAMUSCULAR; INTRAVENOUS EVERY 6 HOURS PRN
Status: DISCONTINUED | OUTPATIENT
Start: 2020-05-25 | End: 2020-06-04 | Stop reason: HOSPADM

## 2020-05-25 RX ORDER — SODIUM CHLORIDE 9 MG/ML
100 INJECTION, SOLUTION INTRAVENOUS CONTINUOUS
Status: ACTIVE | OUTPATIENT
Start: 2020-05-25 | End: 2020-05-26

## 2020-05-25 RX ORDER — ASPIRIN 81 MG/1
81 TABLET ORAL EVERY MORNING
Status: DISCONTINUED | OUTPATIENT
Start: 2020-05-26 | End: 2020-06-04 | Stop reason: HOSPADM

## 2020-05-25 RX ORDER — INSULIN GLARGINE 100 [IU]/ML
20 INJECTION, SOLUTION SUBCUTANEOUS NIGHTLY
Status: DISCONTINUED | OUTPATIENT
Start: 2020-05-26 | End: 2020-05-28

## 2020-05-25 RX ORDER — BUPROPION HYDROCHLORIDE 150 MG/1
150 TABLET ORAL DAILY
Status: DISCONTINUED | OUTPATIENT
Start: 2020-05-25 | End: 2020-06-04 | Stop reason: HOSPADM

## 2020-05-25 RX ORDER — INSULIN GLARGINE 100 [IU]/ML
8 INJECTION, SOLUTION SUBCUTANEOUS NIGHTLY
Status: DISCONTINUED | OUTPATIENT
Start: 2020-05-25 | End: 2020-05-25

## 2020-05-25 RX ORDER — ATORVASTATIN CALCIUM 20 MG/1
40 TABLET, FILM COATED ORAL NIGHTLY
Status: DISCONTINUED | OUTPATIENT
Start: 2020-05-25 | End: 2020-06-04 | Stop reason: HOSPADM

## 2020-05-25 RX ORDER — NITROGLYCERIN 0.4 MG/1
0.4 TABLET SUBLINGUAL
Status: DISCONTINUED | OUTPATIENT
Start: 2020-05-25 | End: 2020-06-04 | Stop reason: HOSPADM

## 2020-05-25 RX ORDER — FERROUS SULFATE 325(65) MG
325 TABLET ORAL
Status: DISCONTINUED | OUTPATIENT
Start: 2020-05-25 | End: 2020-06-04 | Stop reason: HOSPADM

## 2020-05-25 RX ADMIN — CARVEDILOL 6.25 MG: 6.25 TABLET, FILM COATED ORAL at 20:25

## 2020-05-25 RX ADMIN — SODIUM CHLORIDE 100 ML/HR: 9 INJECTION, SOLUTION INTRAVENOUS at 20:28

## 2020-05-25 RX ADMIN — FERROUS SULFATE TAB 325 MG (65 MG ELEMENTAL FE) 325 MG: 325 (65 FE) TAB at 20:25

## 2020-05-25 RX ADMIN — INSULIN GLARGINE 12 UNITS: 100 INJECTION, SOLUTION SUBCUTANEOUS at 21:54

## 2020-05-25 RX ADMIN — ASPIRIN 325 MG: 325 TABLET ORAL at 17:15

## 2020-05-25 RX ADMIN — FOLIC ACID 1 MG: 1 TABLET ORAL at 20:25

## 2020-05-25 RX ADMIN — INSULIN GLARGINE 8 UNITS: 100 INJECTION, SOLUTION SUBCUTANEOUS at 21:07

## 2020-05-25 RX ADMIN — BUPROPION HYDROCHLORIDE 150 MG: 150 TABLET, FILM COATED, EXTENDED RELEASE ORAL at 20:25

## 2020-05-25 RX ADMIN — INSULIN LISPRO 2 UNITS: 100 INJECTION, SOLUTION INTRAVENOUS; SUBCUTANEOUS at 21:08

## 2020-05-25 RX ADMIN — SODIUM CHLORIDE, PRESERVATIVE FREE 10 ML: 5 INJECTION INTRAVENOUS at 20:26

## 2020-05-25 RX ADMIN — INSULIN LISPRO 9 UNITS: 100 INJECTION, SOLUTION INTRAVENOUS; SUBCUTANEOUS at 21:07

## 2020-05-25 RX ADMIN — Medication 1000 MCG: at 20:26

## 2020-05-25 RX ADMIN — VITAMIN D, TAB 1000IU (100/BT) 1000 UNITS: 25 TAB at 20:26

## 2020-05-25 RX ADMIN — AMLODIPINE BESYLATE 5 MG: 5 TABLET ORAL at 20:26

## 2020-05-25 RX ADMIN — SODIUM CHLORIDE 1000 ML: 9 INJECTION, SOLUTION INTRAVENOUS at 17:15

## 2020-05-25 RX ADMIN — ATORVASTATIN CALCIUM 40 MG: 20 TABLET, FILM COATED ORAL at 20:26

## 2020-05-25 RX ADMIN — TAMSULOSIN HYDROCHLORIDE 0.4 MG: 0.4 CAPSULE ORAL at 20:25

## 2020-05-26 PROBLEM — I50.42 CHRONIC COMBINED SYSTOLIC AND DIASTOLIC CONGESTIVE HEART FAILURE: Status: ACTIVE | Noted: 2018-12-12

## 2020-05-26 LAB
ALBUMIN SERPL-MCNC: 3.3 G/DL (ref 3.5–5.2)
ALBUMIN/GLOB SERPL: 1.6 G/DL
ALP SERPL-CCNC: 61 U/L (ref 39–117)
ALT SERPL W P-5'-P-CCNC: 46 U/L (ref 1–41)
ANION GAP SERPL CALCULATED.3IONS-SCNC: 7.1 MMOL/L (ref 5–15)
AST SERPL-CCNC: 15 U/L (ref 1–40)
BASOPHILS # BLD AUTO: 0.02 10*3/MM3 (ref 0–0.2)
BASOPHILS NFR BLD AUTO: 0.2 % (ref 0–1.5)
BILIRUB SERPL-MCNC: 0.2 MG/DL (ref 0.2–1.2)
BUN BLD-MCNC: 51 MG/DL (ref 8–23)
BUN/CREAT SERPL: 26.2 (ref 7–25)
CALCIUM SPEC-SCNC: 8.7 MG/DL (ref 8.6–10.5)
CHLORIDE SERPL-SCNC: 107 MMOL/L (ref 98–107)
CO2 SERPL-SCNC: 24.9 MMOL/L (ref 22–29)
CREAT BLD-MCNC: 1.95 MG/DL (ref 0.76–1.27)
D-LACTATE SERPL-SCNC: 0.8 MMOL/L (ref 0.5–2)
DEPRECATED RDW RBC AUTO: 44.4 FL (ref 37–54)
EOSINOPHIL # BLD AUTO: 0.1 10*3/MM3 (ref 0–0.4)
EOSINOPHIL NFR BLD AUTO: 1.1 % (ref 0.3–6.2)
ERYTHROCYTE [DISTWIDTH] IN BLOOD BY AUTOMATED COUNT: 14.7 % (ref 12.3–15.4)
GFR SERPL CREATININE-BSD FRML MDRD: 35 ML/MIN/1.73
GLOBULIN UR ELPH-MCNC: 2.1 GM/DL
GLUCOSE BLD-MCNC: 78 MG/DL (ref 65–99)
GLUCOSE BLDC GLUCOMTR-MCNC: 130 MG/DL (ref 70–130)
GLUCOSE BLDC GLUCOMTR-MCNC: 202 MG/DL (ref 70–130)
GLUCOSE BLDC GLUCOMTR-MCNC: 209 MG/DL (ref 70–130)
GLUCOSE BLDC GLUCOMTR-MCNC: 75 MG/DL (ref 70–130)
HBA1C MFR BLD: 9.6 % (ref 4.8–5.6)
HCT VFR BLD AUTO: 32.4 % (ref 37.5–51)
HEMOCCULT STL QL: POSITIVE
HGB BLD-MCNC: 10.9 G/DL (ref 13–17.7)
IMM GRANULOCYTES # BLD AUTO: 0.05 10*3/MM3 (ref 0–0.05)
IMM GRANULOCYTES NFR BLD AUTO: 0.6 % (ref 0–0.5)
INR PPP: 2.04 (ref 0.9–1.1)
LYMPHOCYTES # BLD AUTO: 1.79 10*3/MM3 (ref 0.7–3.1)
LYMPHOCYTES NFR BLD AUTO: 20 % (ref 19.6–45.3)
MCH RBC QN AUTO: 27.6 PG (ref 26.6–33)
MCHC RBC AUTO-ENTMCNC: 33.6 G/DL (ref 31.5–35.7)
MCV RBC AUTO: 82 FL (ref 79–97)
MONOCYTES # BLD AUTO: 0.74 10*3/MM3 (ref 0.1–0.9)
MONOCYTES NFR BLD AUTO: 8.3 % (ref 5–12)
NEUTROPHILS # BLD AUTO: 6.24 10*3/MM3 (ref 1.7–7)
NEUTROPHILS NFR BLD AUTO: 69.8 % (ref 42.7–76)
NRBC BLD AUTO-RTO: 0 /100 WBC (ref 0–0.2)
PLATELET # BLD AUTO: 221 10*3/MM3 (ref 140–450)
PMV BLD AUTO: 10.4 FL (ref 6–12)
POTASSIUM BLD-SCNC: 4.3 MMOL/L (ref 3.5–5.2)
PROT SERPL-MCNC: 5.4 G/DL (ref 6–8.5)
PROTHROMBIN TIME: 22.7 SECONDS (ref 11.7–14.2)
RBC # BLD AUTO: 3.95 10*6/MM3 (ref 4.14–5.8)
SODIUM BLD-SCNC: 139 MMOL/L (ref 136–145)
TROPONIN T SERPL-MCNC: 0.07 NG/ML (ref 0–0.03)
WBC NRBC COR # BLD: 8.94 10*3/MM3 (ref 3.4–10.8)

## 2020-05-26 PROCEDURE — 85025 COMPLETE CBC W/AUTO DIFF WBC: CPT | Performed by: INTERNAL MEDICINE

## 2020-05-26 PROCEDURE — 82962 GLUCOSE BLOOD TEST: CPT

## 2020-05-26 PROCEDURE — 85610 PROTHROMBIN TIME: CPT | Performed by: INTERNAL MEDICINE

## 2020-05-26 PROCEDURE — 97162 PT EVAL MOD COMPLEX 30 MIN: CPT

## 2020-05-26 PROCEDURE — 84484 ASSAY OF TROPONIN QUANT: CPT | Performed by: INTERNAL MEDICINE

## 2020-05-26 PROCEDURE — 82272 OCCULT BLD FECES 1-3 TESTS: CPT | Performed by: INTERNAL MEDICINE

## 2020-05-26 PROCEDURE — 99222 1ST HOSP IP/OBS MODERATE 55: CPT | Performed by: NURSE PRACTITIONER

## 2020-05-26 PROCEDURE — 83036 HEMOGLOBIN GLYCOSYLATED A1C: CPT | Performed by: INTERNAL MEDICINE

## 2020-05-26 PROCEDURE — 83605 ASSAY OF LACTIC ACID: CPT | Performed by: INTERNAL MEDICINE

## 2020-05-26 PROCEDURE — 63710000001 INSULIN LISPRO (HUMAN) PER 5 UNITS: Performed by: INTERNAL MEDICINE

## 2020-05-26 PROCEDURE — 63710000001 INSULIN GLARGINE PER 5 UNITS: Performed by: INTERNAL MEDICINE

## 2020-05-26 PROCEDURE — 80053 COMPREHEN METABOLIC PANEL: CPT | Performed by: INTERNAL MEDICINE

## 2020-05-26 RX ORDER — SODIUM CHLORIDE 9 MG/ML
75 INJECTION, SOLUTION INTRAVENOUS CONTINUOUS
Status: ACTIVE | OUTPATIENT
Start: 2020-05-26 | End: 2020-05-27

## 2020-05-26 RX ADMIN — INSULIN GLARGINE 20 UNITS: 100 INJECTION, SOLUTION SUBCUTANEOUS at 20:24

## 2020-05-26 RX ADMIN — INSULIN LISPRO 4 UNITS: 100 INJECTION, SOLUTION INTRAVENOUS; SUBCUTANEOUS at 11:56

## 2020-05-26 RX ADMIN — SODIUM CHLORIDE 75 ML/HR: 9 INJECTION, SOLUTION INTRAVENOUS at 18:45

## 2020-05-26 RX ADMIN — FERROUS SULFATE TAB 325 MG (65 MG ELEMENTAL FE) 325 MG: 325 (65 FE) TAB at 11:57

## 2020-05-26 RX ADMIN — SODIUM CHLORIDE, PRESERVATIVE FREE 10 ML: 5 INJECTION INTRAVENOUS at 08:46

## 2020-05-26 RX ADMIN — LEVOTHYROXINE SODIUM 75 MCG: 75 TABLET ORAL at 08:45

## 2020-05-26 RX ADMIN — AMLODIPINE BESYLATE 5 MG: 5 TABLET ORAL at 08:45

## 2020-05-26 RX ADMIN — BUPROPION HYDROCHLORIDE 150 MG: 150 TABLET, FILM COATED, EXTENDED RELEASE ORAL at 08:45

## 2020-05-26 RX ADMIN — ATORVASTATIN CALCIUM 40 MG: 20 TABLET, FILM COATED ORAL at 20:22

## 2020-05-26 RX ADMIN — FERROUS SULFATE TAB 325 MG (65 MG ELEMENTAL FE) 325 MG: 325 (65 FE) TAB at 17:34

## 2020-05-26 RX ADMIN — FERROUS SULFATE TAB 325 MG (65 MG ELEMENTAL FE) 325 MG: 325 (65 FE) TAB at 08:45

## 2020-05-26 RX ADMIN — WARFARIN 6 MG: 6 TABLET ORAL at 17:34

## 2020-05-26 RX ADMIN — CARVEDILOL 6.25 MG: 6.25 TABLET, FILM COATED ORAL at 17:34

## 2020-05-26 RX ADMIN — VITAMIN D, TAB 1000IU (100/BT) 1000 UNITS: 25 TAB at 08:45

## 2020-05-26 RX ADMIN — SODIUM CHLORIDE, PRESERVATIVE FREE 10 ML: 5 INJECTION INTRAVENOUS at 20:22

## 2020-05-26 RX ADMIN — TAMSULOSIN HYDROCHLORIDE 0.4 MG: 0.4 CAPSULE ORAL at 20:21

## 2020-05-26 RX ADMIN — Medication 1000 MCG: at 08:45

## 2020-05-26 RX ADMIN — FOLIC ACID 1 MG: 1 TABLET ORAL at 08:45

## 2020-05-26 RX ADMIN — ASPIRIN 81 MG: 81 TABLET, COATED ORAL at 06:16

## 2020-05-26 RX ADMIN — CARVEDILOL 6.25 MG: 6.25 TABLET, FILM COATED ORAL at 08:46

## 2020-05-27 ENCOUNTER — APPOINTMENT (OUTPATIENT)
Dept: CARDIOLOGY | Facility: HOSPITAL | Age: 65
End: 2020-05-27

## 2020-05-27 ENCOUNTER — APPOINTMENT (OUTPATIENT)
Dept: GENERAL RADIOLOGY | Facility: HOSPITAL | Age: 65
End: 2020-05-27

## 2020-05-27 PROBLEM — R10.30 LOWER ABDOMINAL PAIN: Status: ACTIVE | Noted: 2020-05-27

## 2020-05-27 PROBLEM — M25.511 ACUTE PAIN OF RIGHT SHOULDER: Status: ACTIVE | Noted: 2020-05-27

## 2020-05-27 LAB
ALBUMIN SERPL-MCNC: 3.1 G/DL (ref 3.5–5.2)
ALBUMIN/GLOB SERPL: 1.5 G/DL
ALP SERPL-CCNC: 56 U/L (ref 39–117)
ALT SERPL W P-5'-P-CCNC: 41 U/L (ref 1–41)
ANION GAP SERPL CALCULATED.3IONS-SCNC: 7.3 MMOL/L (ref 5–15)
AORTIC DIMENSIONLESS INDEX: 0.7 (DI)
AST SERPL-CCNC: 19 U/L (ref 1–40)
BH CV ECHO MEAS - ACS: 2.1 CM
BH CV ECHO MEAS - AO MAX PG: 7 MMHG
BH CV ECHO MEAS - AO MEAN PG (FULL): 2 MMHG
BH CV ECHO MEAS - AO MEAN PG: 4 MMHG
BH CV ECHO MEAS - AO ROOT AREA (BSA CORRECTED): 1.8
BH CV ECHO MEAS - AO ROOT AREA: 9.6 CM^2
BH CV ECHO MEAS - AO ROOT DIAM: 3.5 CM
BH CV ECHO MEAS - AO V2 MAX: 134 CM/SEC
BH CV ECHO MEAS - AO V2 MEAN: 102 CM/SEC
BH CV ECHO MEAS - AO V2 VTI: 28 CM
BH CV ECHO MEAS - AVA(I,A): 2.2 CM^2
BH CV ECHO MEAS - AVA(I,D): 2.2 CM^2
BH CV ECHO MEAS - BSA(HAYCOCK): 1.9 M^2
BH CV ECHO MEAS - BSA: 1.9 M^2
BH CV ECHO MEAS - BZI_BMI: 22.6 KILOGRAMS/M^2
BH CV ECHO MEAS - BZI_METRIC_HEIGHT: 180.3 CM
BH CV ECHO MEAS - BZI_METRIC_WEIGHT: 73.5 KG
BH CV ECHO MEAS - EDV(CUBED): 91.1 ML
BH CV ECHO MEAS - EDV(MOD-SP2): 136 ML
BH CV ECHO MEAS - EDV(MOD-SP4): 165 ML
BH CV ECHO MEAS - EDV(TEICH): 92.4 ML
BH CV ECHO MEAS - EF(CUBED): 73.2 %
BH CV ECHO MEAS - EF(MOD-BP): 60 %
BH CV ECHO MEAS - EF(MOD-SP2): 60.3 %
BH CV ECHO MEAS - EF(MOD-SP4): 61.8 %
BH CV ECHO MEAS - EF(TEICH): 65.2 %
BH CV ECHO MEAS - ESV(CUBED): 24.4 ML
BH CV ECHO MEAS - ESV(MOD-SP2): 54 ML
BH CV ECHO MEAS - ESV(MOD-SP4): 63 ML
BH CV ECHO MEAS - ESV(TEICH): 32.2 ML
BH CV ECHO MEAS - FS: 35.6 %
BH CV ECHO MEAS - IVS/LVPW: 1.3
BH CV ECHO MEAS - IVSD: 1.5 CM
BH CV ECHO MEAS - LA DIMENSION: 4.7 CM
BH CV ECHO MEAS - LA/AO: 1.3
BH CV ECHO MEAS - LAT PEAK E' VEL: 8.7 CM/SEC
BH CV ECHO MEAS - LV DIASTOLIC VOL/BSA (35-75): 85.6 ML/M^2
BH CV ECHO MEAS - LV MASS(C)D: 235.3 GRAMS
BH CV ECHO MEAS - LV MASS(C)DI: 122 GRAMS/M^2
BH CV ECHO MEAS - LV MEAN PG: 2 MMHG
BH CV ECHO MEAS - LV SYSTOLIC VOL/BSA (12-30): 32.7 ML/M^2
BH CV ECHO MEAS - LV V1 MAX: 89 CM/SEC
BH CV ECHO MEAS - LV V1 MEAN: 63.3 CM/SEC
BH CV ECHO MEAS - LV V1 VTI: 19.6 CM
BH CV ECHO MEAS - LVIDD: 4.5 CM
BH CV ECHO MEAS - LVIDS: 2.9 CM
BH CV ECHO MEAS - LVLD AP2: 9.3 CM
BH CV ECHO MEAS - LVLD AP4: 9.6 CM
BH CV ECHO MEAS - LVLS AP2: 9.1 CM
BH CV ECHO MEAS - LVLS AP4: 8.3 CM
BH CV ECHO MEAS - LVOT AREA (M): 3.1 CM^2
BH CV ECHO MEAS - LVOT AREA: 3.1 CM^2
BH CV ECHO MEAS - LVOT DIAM: 2 CM
BH CV ECHO MEAS - LVPWD: 1.2 CM
BH CV ECHO MEAS - MED PEAK E' VEL: 5.8 CM/SEC
BH CV ECHO MEAS - MV A DUR: 0.22 SEC
BH CV ECHO MEAS - MV A MAX VEL: 90.8 CM/SEC
BH CV ECHO MEAS - MV DEC SLOPE: 188 CM/SEC^2
BH CV ECHO MEAS - MV DEC TIME: 0.27 SEC
BH CV ECHO MEAS - MV E MAX VEL: 75.5 CM/SEC
BH CV ECHO MEAS - MV E/A: 0.83
BH CV ECHO MEAS - MV MEAN PG: 2 MMHG
BH CV ECHO MEAS - MV P1/2T MAX VEL: 90.2 CM/SEC
BH CV ECHO MEAS - MV P1/2T: 140.5 MSEC
BH CV ECHO MEAS - MV V2 MEAN: 64 CM/SEC
BH CV ECHO MEAS - MV V2 VTI: 32.3 CM
BH CV ECHO MEAS - MVA P1/2T LCG: 2.4 CM^2
BH CV ECHO MEAS - MVA(P1/2T): 1.6 CM^2
BH CV ECHO MEAS - MVA(VTI): 1.9 CM^2
BH CV ECHO MEAS - PA ACC SLOPE: 707 CM/SEC^2
BH CV ECHO MEAS - PA ACC TIME: 0.13 SEC
BH CV ECHO MEAS - PA MAX PG: 3.5 MMHG
BH CV ECHO MEAS - PA PR(ACCEL): 21.9 MMHG
BH CV ECHO MEAS - PA V2 MAX: 93.7 CM/SEC
BH CV ECHO MEAS - PULM A REVS DUR: 0.16 SEC
BH CV ECHO MEAS - PULM A REVS VEL: 21.2 CM/SEC
BH CV ECHO MEAS - PULM DIAS VEL: 32.4 CM/SEC
BH CV ECHO MEAS - PULM S/D: 0.93
BH CV ECHO MEAS - PULM SYS VEL: 30.1 CM/SEC
BH CV ECHO MEAS - QP/QS: 0.91
BH CV ECHO MEAS - RV MEAN PG: 1 MMHG
BH CV ECHO MEAS - RV V1 MEAN: 49.1 CM/SEC
BH CV ECHO MEAS - RV V1 VTI: 16.1 CM
BH CV ECHO MEAS - RVOT AREA: 3.5 CM^2
BH CV ECHO MEAS - RVOT DIAM: 2.1 CM
BH CV ECHO MEAS - SI(AO): 139.7 ML/M^2
BH CV ECHO MEAS - SI(CUBED): 34.6 ML/M^2
BH CV ECHO MEAS - SI(LVOT): 31.9 ML/M^2
BH CV ECHO MEAS - SI(MOD-SP2): 42.5 ML/M^2
BH CV ECHO MEAS - SI(MOD-SP4): 52.9 ML/M^2
BH CV ECHO MEAS - SI(TEICH): 31.2 ML/M^2
BH CV ECHO MEAS - SV(AO): 269.4 ML
BH CV ECHO MEAS - SV(CUBED): 66.7 ML
BH CV ECHO MEAS - SV(LVOT): 61.6 ML
BH CV ECHO MEAS - SV(MOD-SP2): 82 ML
BH CV ECHO MEAS - SV(MOD-SP4): 102 ML
BH CV ECHO MEAS - SV(RVOT): 55.8 ML
BH CV ECHO MEAS - SV(TEICH): 60.2 ML
BH CV ECHO MEAS - TAPSE (>1.6): 2.3 CM2
BH CV ECHO MEASUREMENTS AVERAGE E/E' RATIO: 10.41
BH CV XLRA - RV BASE: 3.7 CM
BH CV XLRA - RV LENGTH: 8.5 CM
BH CV XLRA - RV MID: 3.8 CM
BH CV XLRA - TDI S': 10.4 CM/SEC
BILIRUB SERPL-MCNC: 0.2 MG/DL (ref 0.2–1.2)
BUN BLD-MCNC: 35 MG/DL (ref 8–23)
BUN/CREAT SERPL: 22.3 (ref 7–25)
CALCIUM SPEC-SCNC: 8.4 MG/DL (ref 8.6–10.5)
CHLORIDE SERPL-SCNC: 109 MMOL/L (ref 98–107)
CO2 SERPL-SCNC: 24.7 MMOL/L (ref 22–29)
CREAT BLD-MCNC: 1.57 MG/DL (ref 0.76–1.27)
DEPRECATED RDW RBC AUTO: 45.1 FL (ref 37–54)
ERYTHROCYTE [DISTWIDTH] IN BLOOD BY AUTOMATED COUNT: 14.6 % (ref 12.3–15.4)
GFR SERPL CREATININE-BSD FRML MDRD: 45 ML/MIN/1.73
GLOBULIN UR ELPH-MCNC: 2.1 GM/DL
GLUCOSE BLD-MCNC: 60 MG/DL (ref 65–99)
GLUCOSE BLDC GLUCOMTR-MCNC: 143 MG/DL (ref 70–130)
GLUCOSE BLDC GLUCOMTR-MCNC: 191 MG/DL (ref 70–130)
GLUCOSE BLDC GLUCOMTR-MCNC: 224 MG/DL (ref 70–130)
GLUCOSE BLDC GLUCOMTR-MCNC: 58 MG/DL (ref 70–130)
HCT VFR BLD AUTO: 32.3 % (ref 37.5–51)
HGB BLD-MCNC: 10.5 G/DL (ref 13–17.7)
INR PPP: 2.12 (ref 0.9–1.1)
LEFT ATRIUM VOLUME INDEX: 37 ML/M2
MAGNESIUM SERPL-MCNC: 2.1 MG/DL (ref 1.6–2.4)
MCH RBC QN AUTO: 26.9 PG (ref 26.6–33)
MCHC RBC AUTO-ENTMCNC: 32.5 G/DL (ref 31.5–35.7)
MCV RBC AUTO: 82.8 FL (ref 79–97)
PHOSPHATE SERPL-MCNC: 3 MG/DL (ref 2.5–4.5)
PLATELET # BLD AUTO: 205 10*3/MM3 (ref 140–450)
PMV BLD AUTO: 10.1 FL (ref 6–12)
POTASSIUM BLD-SCNC: 4 MMOL/L (ref 3.5–5.2)
PROT SERPL-MCNC: 5.2 G/DL (ref 6–8.5)
PROTHROMBIN TIME: 23.4 SECONDS (ref 11.7–14.2)
RBC # BLD AUTO: 3.9 10*6/MM3 (ref 4.14–5.8)
SODIUM BLD-SCNC: 141 MMOL/L (ref 136–145)
WBC NRBC COR # BLD: 5.93 10*3/MM3 (ref 3.4–10.8)

## 2020-05-27 PROCEDURE — 82962 GLUCOSE BLOOD TEST: CPT

## 2020-05-27 PROCEDURE — 93306 TTE W/DOPPLER COMPLETE: CPT

## 2020-05-27 PROCEDURE — 84100 ASSAY OF PHOSPHORUS: CPT | Performed by: INTERNAL MEDICINE

## 2020-05-27 PROCEDURE — 25010000002 PERFLUTREN (DEFINITY) 8.476 MG IN SODIUM CHLORIDE (PF) 0.9 % 10 ML INJECTION: Performed by: NURSE PRACTITIONER

## 2020-05-27 PROCEDURE — 63710000001 INSULIN LISPRO (HUMAN) PER 5 UNITS: Performed by: INTERNAL MEDICINE

## 2020-05-27 PROCEDURE — 85610 PROTHROMBIN TIME: CPT | Performed by: INTERNAL MEDICINE

## 2020-05-27 PROCEDURE — 83735 ASSAY OF MAGNESIUM: CPT | Performed by: INTERNAL MEDICINE

## 2020-05-27 PROCEDURE — 99222 1ST HOSP IP/OBS MODERATE 55: CPT | Performed by: INTERNAL MEDICINE

## 2020-05-27 PROCEDURE — 93306 TTE W/DOPPLER COMPLETE: CPT | Performed by: INTERNAL MEDICINE

## 2020-05-27 PROCEDURE — 85027 COMPLETE CBC AUTOMATED: CPT | Performed by: NURSE PRACTITIONER

## 2020-05-27 PROCEDURE — 74018 RADEX ABDOMEN 1 VIEW: CPT

## 2020-05-27 PROCEDURE — 80053 COMPREHEN METABOLIC PANEL: CPT | Performed by: INTERNAL MEDICINE

## 2020-05-27 PROCEDURE — 63710000001 INSULIN GLARGINE PER 5 UNITS: Performed by: INTERNAL MEDICINE

## 2020-05-27 PROCEDURE — 99232 SBSQ HOSP IP/OBS MODERATE 35: CPT | Performed by: INTERNAL MEDICINE

## 2020-05-27 RX ORDER — LIDOCAINE 50 MG/G
1 PATCH TOPICAL
Status: DISCONTINUED | OUTPATIENT
Start: 2020-05-27 | End: 2020-06-04 | Stop reason: HOSPADM

## 2020-05-27 RX ORDER — METOPROLOL SUCCINATE 25 MG/1
25 TABLET, EXTENDED RELEASE ORAL
Status: DISCONTINUED | OUTPATIENT
Start: 2020-05-27 | End: 2020-06-03

## 2020-05-27 RX ADMIN — LEVOTHYROXINE SODIUM 75 MCG: 75 TABLET ORAL at 09:42

## 2020-05-27 RX ADMIN — ASPIRIN 81 MG: 81 TABLET, COATED ORAL at 05:17

## 2020-05-27 RX ADMIN — VITAMIN D, TAB 1000IU (100/BT) 1000 UNITS: 25 TAB at 09:42

## 2020-05-27 RX ADMIN — Medication 1000 MCG: at 09:43

## 2020-05-27 RX ADMIN — LIDOCAINE 1 PATCH: 50 PATCH TOPICAL at 14:27

## 2020-05-27 RX ADMIN — FERROUS SULFATE TAB 325 MG (65 MG ELEMENTAL FE) 325 MG: 325 (65 FE) TAB at 09:43

## 2020-05-27 RX ADMIN — CARVEDILOL 6.25 MG: 6.25 TABLET, FILM COATED ORAL at 09:43

## 2020-05-27 RX ADMIN — ATORVASTATIN CALCIUM 40 MG: 20 TABLET, FILM COATED ORAL at 20:41

## 2020-05-27 RX ADMIN — SODIUM CHLORIDE, PRESERVATIVE FREE 10 ML: 5 INJECTION INTRAVENOUS at 09:43

## 2020-05-27 RX ADMIN — AMLODIPINE BESYLATE 5 MG: 5 TABLET ORAL at 09:43

## 2020-05-27 RX ADMIN — BUPROPION HYDROCHLORIDE 150 MG: 150 TABLET, FILM COATED, EXTENDED RELEASE ORAL at 09:42

## 2020-05-27 RX ADMIN — TAMSULOSIN HYDROCHLORIDE 0.4 MG: 0.4 CAPSULE ORAL at 20:52

## 2020-05-27 RX ADMIN — SODIUM CHLORIDE 75 ML/HR: 9 INJECTION, SOLUTION INTRAVENOUS at 09:42

## 2020-05-27 RX ADMIN — FERROUS SULFATE TAB 325 MG (65 MG ELEMENTAL FE) 325 MG: 325 (65 FE) TAB at 12:26

## 2020-05-27 RX ADMIN — INSULIN LISPRO 4 UNITS: 100 INJECTION, SOLUTION INTRAVENOUS; SUBCUTANEOUS at 17:37

## 2020-05-27 RX ADMIN — METOPROLOL SUCCINATE 25 MG: 25 TABLET, EXTENDED RELEASE ORAL at 17:36

## 2020-05-27 RX ADMIN — FERROUS SULFATE TAB 325 MG (65 MG ELEMENTAL FE) 325 MG: 325 (65 FE) TAB at 17:37

## 2020-05-27 RX ADMIN — PERFLUTREN 2 ML: 6.52 INJECTION, SUSPENSION INTRAVENOUS at 09:45

## 2020-05-27 RX ADMIN — FOLIC ACID 1 MG: 1 TABLET ORAL at 09:43

## 2020-05-27 RX ADMIN — INSULIN GLARGINE 20 UNITS: 100 INJECTION, SOLUTION SUBCUTANEOUS at 20:52

## 2020-05-28 LAB
ALBUMIN SERPL-MCNC: 3 G/DL (ref 3.5–5.2)
ANION GAP SERPL CALCULATED.3IONS-SCNC: 6.5 MMOL/L (ref 5–15)
BUN BLD-MCNC: 32 MG/DL (ref 8–23)
BUN/CREAT SERPL: 18.7 (ref 7–25)
CALCIUM SPEC-SCNC: 8.5 MG/DL (ref 8.6–10.5)
CHLORIDE SERPL-SCNC: 107 MMOL/L (ref 98–107)
CO2 SERPL-SCNC: 23.5 MMOL/L (ref 22–29)
CREAT BLD-MCNC: 1.71 MG/DL (ref 0.76–1.27)
DEPRECATED RDW RBC AUTO: 42.5 FL (ref 37–54)
ERYTHROCYTE [DISTWIDTH] IN BLOOD BY AUTOMATED COUNT: 14.5 % (ref 12.3–15.4)
GFR SERPL CREATININE-BSD FRML MDRD: 40 ML/MIN/1.73
GLUCOSE BLD-MCNC: 98 MG/DL (ref 65–99)
GLUCOSE BLDC GLUCOMTR-MCNC: 120 MG/DL (ref 70–130)
GLUCOSE BLDC GLUCOMTR-MCNC: 58 MG/DL (ref 70–130)
GLUCOSE BLDC GLUCOMTR-MCNC: 72 MG/DL (ref 70–130)
GLUCOSE BLDC GLUCOMTR-MCNC: 91 MG/DL (ref 70–130)
GLUCOSE BLDC GLUCOMTR-MCNC: 98 MG/DL (ref 70–130)
HCT VFR BLD AUTO: 30.3 % (ref 37.5–51)
HGB BLD-MCNC: 9.9 G/DL (ref 13–17.7)
INR PPP: 2.04 (ref 0.9–1.1)
MCH RBC QN AUTO: 26.8 PG (ref 26.6–33)
MCHC RBC AUTO-ENTMCNC: 32.7 G/DL (ref 31.5–35.7)
MCV RBC AUTO: 81.9 FL (ref 79–97)
PHOSPHATE SERPL-MCNC: 3.2 MG/DL (ref 2.5–4.5)
PLATELET # BLD AUTO: 185 10*3/MM3 (ref 140–450)
PMV BLD AUTO: 10.7 FL (ref 6–12)
POTASSIUM BLD-SCNC: 4.2 MMOL/L (ref 3.5–5.2)
PROTHROMBIN TIME: 22.7 SECONDS (ref 11.7–14.2)
RBC # BLD AUTO: 3.7 10*6/MM3 (ref 4.14–5.8)
SODIUM BLD-SCNC: 137 MMOL/L (ref 136–145)
WBC NRBC COR # BLD: 6.9 10*3/MM3 (ref 3.4–10.8)

## 2020-05-28 PROCEDURE — 97110 THERAPEUTIC EXERCISES: CPT

## 2020-05-28 PROCEDURE — 80069 RENAL FUNCTION PANEL: CPT | Performed by: INTERNAL MEDICINE

## 2020-05-28 PROCEDURE — 63710000001 INSULIN LISPRO (HUMAN) PER 5 UNITS: Performed by: INTERNAL MEDICINE

## 2020-05-28 PROCEDURE — 99232 SBSQ HOSP IP/OBS MODERATE 35: CPT | Performed by: NURSE PRACTITIONER

## 2020-05-28 PROCEDURE — 82962 GLUCOSE BLOOD TEST: CPT

## 2020-05-28 PROCEDURE — 85610 PROTHROMBIN TIME: CPT | Performed by: INTERNAL MEDICINE

## 2020-05-28 PROCEDURE — 85027 COMPLETE CBC AUTOMATED: CPT | Performed by: NURSE PRACTITIONER

## 2020-05-28 PROCEDURE — 99232 SBSQ HOSP IP/OBS MODERATE 35: CPT | Performed by: INTERNAL MEDICINE

## 2020-05-28 PROCEDURE — 63710000001 INSULIN GLARGINE PER 5 UNITS: Performed by: NURSE PRACTITIONER

## 2020-05-28 RX ORDER — INSULIN GLARGINE 100 [IU]/ML
15 INJECTION, SOLUTION SUBCUTANEOUS NIGHTLY
Status: DISCONTINUED | OUTPATIENT
Start: 2020-05-28 | End: 2020-05-29

## 2020-05-28 RX ORDER — LIDOCAINE 50 MG/G
OINTMENT TOPICAL AS NEEDED
Status: DISCONTINUED | OUTPATIENT
Start: 2020-05-28 | End: 2020-06-04 | Stop reason: HOSPADM

## 2020-05-28 RX ORDER — AMLODIPINE BESYLATE 2.5 MG/1
2.5 TABLET ORAL DAILY
Status: DISCONTINUED | OUTPATIENT
Start: 2020-05-29 | End: 2020-05-29

## 2020-05-28 RX ADMIN — SODIUM CHLORIDE, PRESERVATIVE FREE 10 ML: 5 INJECTION INTRAVENOUS at 08:27

## 2020-05-28 RX ADMIN — BUPROPION HYDROCHLORIDE 150 MG: 150 TABLET, FILM COATED, EXTENDED RELEASE ORAL at 08:26

## 2020-05-28 RX ADMIN — FOLIC ACID 1 MG: 1 TABLET ORAL at 08:26

## 2020-05-28 RX ADMIN — VITAMIN D, TAB 1000IU (100/BT) 1000 UNITS: 25 TAB at 08:26

## 2020-05-28 RX ADMIN — TAMSULOSIN HYDROCHLORIDE 0.4 MG: 0.4 CAPSULE ORAL at 21:06

## 2020-05-28 RX ADMIN — AMLODIPINE BESYLATE 5 MG: 5 TABLET ORAL at 08:25

## 2020-05-28 RX ADMIN — FERROUS SULFATE TAB 325 MG (65 MG ELEMENTAL FE) 325 MG: 325 (65 FE) TAB at 12:20

## 2020-05-28 RX ADMIN — LIDOCAINE 1 PATCH: 50 PATCH TOPICAL at 08:27

## 2020-05-28 RX ADMIN — Medication 1000 MCG: at 08:26

## 2020-05-28 RX ADMIN — INSULIN LISPRO 5 UNITS: 100 INJECTION, SOLUTION INTRAVENOUS; SUBCUTANEOUS at 08:26

## 2020-05-28 RX ADMIN — INSULIN LISPRO 5 UNITS: 100 INJECTION, SOLUTION INTRAVENOUS; SUBCUTANEOUS at 17:34

## 2020-05-28 RX ADMIN — METOPROLOL SUCCINATE 25 MG: 25 TABLET, EXTENDED RELEASE ORAL at 12:20

## 2020-05-28 RX ADMIN — LIDOCAINE: 50 OINTMENT TOPICAL at 17:34

## 2020-05-28 RX ADMIN — ASPIRIN 81 MG: 81 TABLET, COATED ORAL at 08:26

## 2020-05-28 RX ADMIN — INSULIN GLARGINE 15 UNITS: 100 INJECTION, SOLUTION SUBCUTANEOUS at 21:06

## 2020-05-28 RX ADMIN — LEVOTHYROXINE SODIUM 75 MCG: 75 TABLET ORAL at 08:26

## 2020-05-28 RX ADMIN — INSULIN LISPRO 5 UNITS: 100 INJECTION, SOLUTION INTRAVENOUS; SUBCUTANEOUS at 12:20

## 2020-05-28 RX ADMIN — FERROUS SULFATE TAB 325 MG (65 MG ELEMENTAL FE) 325 MG: 325 (65 FE) TAB at 08:26

## 2020-05-28 RX ADMIN — FERROUS SULFATE TAB 325 MG (65 MG ELEMENTAL FE) 325 MG: 325 (65 FE) TAB at 17:34

## 2020-05-28 RX ADMIN — ATORVASTATIN CALCIUM 40 MG: 20 TABLET, FILM COATED ORAL at 21:06

## 2020-05-29 ENCOUNTER — ANESTHESIA EVENT (OUTPATIENT)
Dept: GASTROENTEROLOGY | Facility: HOSPITAL | Age: 65
End: 2020-05-29

## 2020-05-29 ENCOUNTER — ANESTHESIA (OUTPATIENT)
Dept: GASTROENTEROLOGY | Facility: HOSPITAL | Age: 65
End: 2020-05-29

## 2020-05-29 PROBLEM — R19.5 HEME POSITIVE STOOL: Status: ACTIVE | Noted: 2020-05-25

## 2020-05-29 PROBLEM — D64.9 ACUTE ON CHRONIC ANEMIA: Status: ACTIVE | Noted: 2020-05-25

## 2020-05-29 LAB
ALBUMIN SERPL-MCNC: 2.7 G/DL (ref 3.5–5.2)
ANION GAP SERPL CALCULATED.3IONS-SCNC: 5.9 MMOL/L (ref 5–15)
BUN BLD-MCNC: 37 MG/DL (ref 8–23)
BUN/CREAT SERPL: 21.8 (ref 7–25)
CALCIUM SPEC-SCNC: 8.3 MG/DL (ref 8.6–10.5)
CHLORIDE SERPL-SCNC: 107 MMOL/L (ref 98–107)
CO2 SERPL-SCNC: 23.1 MMOL/L (ref 22–29)
CREAT BLD-MCNC: 1.7 MG/DL (ref 0.76–1.27)
DEPRECATED RDW RBC AUTO: 44.4 FL (ref 37–54)
ERYTHROCYTE [DISTWIDTH] IN BLOOD BY AUTOMATED COUNT: 14.6 % (ref 12.3–15.4)
GFR SERPL CREATININE-BSD FRML MDRD: 41 ML/MIN/1.73
GLUCOSE BLD-MCNC: 131 MG/DL (ref 65–99)
GLUCOSE BLDC GLUCOMTR-MCNC: 124 MG/DL (ref 70–130)
GLUCOSE BLDC GLUCOMTR-MCNC: 145 MG/DL (ref 70–130)
GLUCOSE BLDC GLUCOMTR-MCNC: 246 MG/DL (ref 70–130)
GLUCOSE BLDC GLUCOMTR-MCNC: 42 MG/DL (ref 70–130)
GLUCOSE BLDC GLUCOMTR-MCNC: 52 MG/DL (ref 70–130)
GLUCOSE BLDC GLUCOMTR-MCNC: 67 MG/DL (ref 70–130)
GLUCOSE BLDC GLUCOMTR-MCNC: 77 MG/DL (ref 70–130)
HCT VFR BLD AUTO: 29.4 % (ref 37.5–51)
HGB BLD-MCNC: 9.7 G/DL (ref 13–17.7)
INR PPP: 1.5 (ref 0.9–1.1)
MCH RBC QN AUTO: 27.4 PG (ref 26.6–33)
MCHC RBC AUTO-ENTMCNC: 33 G/DL (ref 31.5–35.7)
MCV RBC AUTO: 83.1 FL (ref 79–97)
PHOSPHATE SERPL-MCNC: 3.4 MG/DL (ref 2.5–4.5)
PLATELET # BLD AUTO: 168 10*3/MM3 (ref 140–450)
PMV BLD AUTO: 9.6 FL (ref 6–12)
POTASSIUM BLD-SCNC: 4.5 MMOL/L (ref 3.5–5.2)
PROTHROMBIN TIME: 17.8 SECONDS (ref 11.7–14.2)
RBC # BLD AUTO: 3.54 10*6/MM3 (ref 4.14–5.8)
SARS-COV-2 RNA RESP QL NAA+PROBE: NOT DETECTED
SODIUM BLD-SCNC: 136 MMOL/L (ref 136–145)
WBC NRBC COR # BLD: 7.02 10*3/MM3 (ref 3.4–10.8)

## 2020-05-29 PROCEDURE — 25010000002 GLUCAGON (HUMAN RECOMBINANT) 1 MG RECONSTITUTED SOLUTION: Performed by: INTERNAL MEDICINE

## 2020-05-29 PROCEDURE — 87635 SARS-COV-2 COVID-19 AMP PRB: CPT | Performed by: INTERNAL MEDICINE

## 2020-05-29 PROCEDURE — 99232 SBSQ HOSP IP/OBS MODERATE 35: CPT | Performed by: NURSE PRACTITIONER

## 2020-05-29 PROCEDURE — 82962 GLUCOSE BLOOD TEST: CPT

## 2020-05-29 PROCEDURE — 0DB68ZX EXCISION OF STOMACH, VIA NATURAL OR ARTIFICIAL OPENING ENDOSCOPIC, DIAGNOSTIC: ICD-10-PCS | Performed by: INTERNAL MEDICINE

## 2020-05-29 PROCEDURE — 88305 TISSUE EXAM BY PATHOLOGIST: CPT | Performed by: INTERNAL MEDICINE

## 2020-05-29 PROCEDURE — 80069 RENAL FUNCTION PANEL: CPT | Performed by: INTERNAL MEDICINE

## 2020-05-29 PROCEDURE — 0DB58ZX EXCISION OF ESOPHAGUS, VIA NATURAL OR ARTIFICIAL OPENING ENDOSCOPIC, DIAGNOSTIC: ICD-10-PCS | Performed by: INTERNAL MEDICINE

## 2020-05-29 PROCEDURE — 43239 EGD BIOPSY SINGLE/MULTIPLE: CPT | Performed by: INTERNAL MEDICINE

## 2020-05-29 PROCEDURE — 99232 SBSQ HOSP IP/OBS MODERATE 35: CPT | Performed by: INTERNAL MEDICINE

## 2020-05-29 PROCEDURE — 85610 PROTHROMBIN TIME: CPT | Performed by: INTERNAL MEDICINE

## 2020-05-29 PROCEDURE — 25010000002 PROPOFOL 10 MG/ML EMULSION: Performed by: ANESTHESIOLOGY

## 2020-05-29 PROCEDURE — 0DB98ZX EXCISION OF DUODENUM, VIA NATURAL OR ARTIFICIAL OPENING ENDOSCOPIC, DIAGNOSTIC: ICD-10-PCS | Performed by: INTERNAL MEDICINE

## 2020-05-29 PROCEDURE — 85027 COMPLETE CBC AUTOMATED: CPT | Performed by: NURSE PRACTITIONER

## 2020-05-29 RX ORDER — SODIUM CHLORIDE 9 MG/ML
30 INJECTION, SOLUTION INTRAVENOUS CONTINUOUS PRN
Status: DISCONTINUED | OUTPATIENT
Start: 2020-05-29 | End: 2020-05-30

## 2020-05-29 RX ORDER — PROPOFOL 10 MG/ML
VIAL (ML) INTRAVENOUS AS NEEDED
Status: DISCONTINUED | OUTPATIENT
Start: 2020-05-29 | End: 2020-05-29 | Stop reason: SURG

## 2020-05-29 RX ORDER — PROPOFOL 10 MG/ML
VIAL (ML) INTRAVENOUS CONTINUOUS PRN
Status: DISCONTINUED | OUTPATIENT
Start: 2020-05-29 | End: 2020-05-29 | Stop reason: SURG

## 2020-05-29 RX ORDER — SODIUM CHLORIDE, SODIUM LACTATE, POTASSIUM CHLORIDE, CALCIUM CHLORIDE 600; 310; 30; 20 MG/100ML; MG/100ML; MG/100ML; MG/100ML
30 INJECTION, SOLUTION INTRAVENOUS CONTINUOUS
Status: DISCONTINUED | OUTPATIENT
Start: 2020-05-29 | End: 2020-05-30

## 2020-05-29 RX ORDER — INSULIN GLARGINE 100 [IU]/ML
15 INJECTION, SOLUTION SUBCUTANEOUS EVERY MORNING
Status: DISCONTINUED | OUTPATIENT
Start: 2020-05-30 | End: 2020-06-04 | Stop reason: HOSPADM

## 2020-05-29 RX ORDER — SODIUM CHLORIDE, SODIUM LACTATE, POTASSIUM CHLORIDE, CALCIUM CHLORIDE 600; 310; 30; 20 MG/100ML; MG/100ML; MG/100ML; MG/100ML
INJECTION, SOLUTION INTRAVENOUS CONTINUOUS PRN
Status: DISCONTINUED | OUTPATIENT
Start: 2020-05-29 | End: 2020-05-29 | Stop reason: SURG

## 2020-05-29 RX ORDER — LIDOCAINE HYDROCHLORIDE 20 MG/ML
INJECTION, SOLUTION INFILTRATION; PERINEURAL AS NEEDED
Status: DISCONTINUED | OUTPATIENT
Start: 2020-05-29 | End: 2020-05-29 | Stop reason: SURG

## 2020-05-29 RX ADMIN — LIDOCAINE: 50 OINTMENT TOPICAL at 21:32

## 2020-05-29 RX ADMIN — SODIUM CHLORIDE 30 ML/HR: 9 INJECTION, SOLUTION INTRAVENOUS at 13:42

## 2020-05-29 RX ADMIN — SODIUM CHLORIDE, POTASSIUM CHLORIDE, SODIUM LACTATE AND CALCIUM CHLORIDE: 600; 310; 30; 20 INJECTION, SOLUTION INTRAVENOUS at 16:33

## 2020-05-29 RX ADMIN — TAMSULOSIN HYDROCHLORIDE 0.4 MG: 0.4 CAPSULE ORAL at 21:32

## 2020-05-29 RX ADMIN — PROPOFOL 140 MG: 10 INJECTION, EMULSION INTRAVENOUS at 16:34

## 2020-05-29 RX ADMIN — LIDOCAINE 1 PATCH: 50 PATCH TOPICAL at 09:19

## 2020-05-29 RX ADMIN — SODIUM CHLORIDE, PRESERVATIVE FREE 10 ML: 5 INJECTION INTRAVENOUS at 09:22

## 2020-05-29 RX ADMIN — LIDOCAINE HYDROCHLORIDE 40 MG: 20 INJECTION, SOLUTION INFILTRATION; PERINEURAL at 16:33

## 2020-05-29 RX ADMIN — PROPOFOL 14 MCG/KG/MIN: 10 INJECTION, EMULSION INTRAVENOUS at 16:34

## 2020-05-29 RX ADMIN — DEXTROSE MONOHYDRATE 25 G: 500 INJECTION PARENTERAL at 17:23

## 2020-05-29 RX ADMIN — DEXTROSE MONOHYDRATE 25 G: 500 INJECTION PARENTERAL at 06:33

## 2020-05-29 RX ADMIN — FERROUS SULFATE TAB 325 MG (65 MG ELEMENTAL FE) 325 MG: 325 (65 FE) TAB at 18:31

## 2020-05-29 RX ADMIN — GLUCAGON HYDROCHLORIDE 1 MG: KIT at 11:53

## 2020-05-29 RX ADMIN — ATORVASTATIN CALCIUM 40 MG: 20 TABLET, FILM COATED ORAL at 21:32

## 2020-05-29 NOTE — ANESTHESIA POSTPROCEDURE EVALUATION
"Patient: Carlito Mo    Procedure Summary     Date:  05/29/20 Room / Location:  [facility] ENDOSCOPY 8 / [facility] ENDOSCOPY    Anesthesia Start:  1632 Anesthesia Stop:  1652    Procedure:  ESOPHAGOGASTRODUODENOSCOPY with biopsies (N/A Esophagus) Diagnosis:       Acute on chronic anemia      Heme positive stool      (Acute on chronic anemia [D64.9])      (Heme positive stool [R19.5])    Surgeon:  Amanda Lovelace MD Provider:  Angella Marie MD    Anesthesia Type:  MAC ASA Status:  4          Anesthesia Type: MAC    Vitals  Vitals Value Taken Time   /49 5/29/2020  5:00 PM   Temp     Pulse 54 5/29/2020  5:00 PM   Resp 16 5/29/2020  5:00 PM   SpO2 98 % 5/29/2020  5:00 PM           Post Anesthesia Care and Evaluation    Patient location during evaluation: PHASE II  Patient participation: complete - patient participated  Level of consciousness: awake and alert  Pain management: adequate  Airway patency: patent  Anesthetic complications: No anesthetic complications    Cardiovascular status: acceptable  Respiratory status: acceptable  Hydration status: acceptable    Comments: /49 (BP Location: Right arm, Patient Position: Lying)   Pulse 54   Temp 36.4 °C (97.6 °F) (Oral)   Resp 16   Ht 180.3 cm (71\")   Wt 78.7 kg (173 lb 6.4 oz)   SpO2 98%   BMI 24.18 kg/m²         "

## 2020-05-29 NOTE — ANESTHESIA PREPROCEDURE EVALUATION
Anesthesia Evaluation     Patient summary reviewed and Nursing notes reviewed                Airway   Mallampati: I  TM distance: >3 FB  Neck ROM: full  No difficulty expected  Dental - normal exam     Pulmonary - normal exam   (+) pneumonia , pleural effusion, COPD, sleep apnea,   Cardiovascular - normal exam    (+) hypertension, past MI , CAD, CABG, dysrhythmias, CHF , hyperlipidemia,       Neuro/Psych  (+) TIA, CVA, psychiatric history Depression,     GI/Hepatic/Renal/Endo    (+)   renal disease CRI, diabetes mellitus,     Musculoskeletal     Abdominal  - normal exam    Bowel sounds: normal.   Substance History - negative use     OB/GYN negative ob/gyn ROS         Other   arthritis,                      Anesthesia Plan    ASA 4     MAC       Anesthetic plan, all risks, benefits, and alternatives have been provided, discussed and informed consent has been obtained with: patient.

## 2020-05-30 PROBLEM — G90.3 NEUROGENIC ORTHOSTATIC HYPOTENSION (HCC): Status: ACTIVE | Noted: 2020-05-30

## 2020-05-30 LAB
ALBUMIN SERPL-MCNC: 3 G/DL (ref 3.5–5.2)
ANION GAP SERPL CALCULATED.3IONS-SCNC: 7.7 MMOL/L (ref 5–15)
BASOPHILS # BLD AUTO: 0.01 10*3/MM3 (ref 0–0.2)
BASOPHILS NFR BLD AUTO: 0.1 % (ref 0–1.5)
BUN BLD-MCNC: 33 MG/DL (ref 8–23)
BUN/CREAT SERPL: 20.5 (ref 7–25)
CALCIUM SPEC-SCNC: 8.6 MG/DL (ref 8.6–10.5)
CHLORIDE SERPL-SCNC: 107 MMOL/L (ref 98–107)
CO2 SERPL-SCNC: 25.3 MMOL/L (ref 22–29)
CREAT BLD-MCNC: 1.61 MG/DL (ref 0.76–1.27)
DEPRECATED RDW RBC AUTO: 43.4 FL (ref 37–54)
EOSINOPHIL # BLD AUTO: 0.19 10*3/MM3 (ref 0–0.4)
EOSINOPHIL NFR BLD AUTO: 2.5 % (ref 0.3–6.2)
ERYTHROCYTE [DISTWIDTH] IN BLOOD BY AUTOMATED COUNT: 14.4 % (ref 12.3–15.4)
GFR SERPL CREATININE-BSD FRML MDRD: 43 ML/MIN/1.73
GLUCOSE BLD-MCNC: 232 MG/DL (ref 65–99)
GLUCOSE BLDC GLUCOMTR-MCNC: 210 MG/DL (ref 70–130)
GLUCOSE BLDC GLUCOMTR-MCNC: 218 MG/DL (ref 70–130)
GLUCOSE BLDC GLUCOMTR-MCNC: 243 MG/DL (ref 70–130)
HCT VFR BLD AUTO: 30.6 % (ref 37.5–51)
HGB BLD-MCNC: 10.1 G/DL (ref 13–17.7)
IMM GRANULOCYTES # BLD AUTO: 0.04 10*3/MM3 (ref 0–0.05)
IMM GRANULOCYTES NFR BLD AUTO: 0.5 % (ref 0–0.5)
INR PPP: 1.24 (ref 0.9–1.1)
LYMPHOCYTES # BLD AUTO: 1.03 10*3/MM3 (ref 0.7–3.1)
LYMPHOCYTES NFR BLD AUTO: 13.3 % (ref 19.6–45.3)
MCH RBC QN AUTO: 27.4 PG (ref 26.6–33)
MCHC RBC AUTO-ENTMCNC: 33 G/DL (ref 31.5–35.7)
MCV RBC AUTO: 82.9 FL (ref 79–97)
MONOCYTES # BLD AUTO: 0.67 10*3/MM3 (ref 0.1–0.9)
MONOCYTES NFR BLD AUTO: 8.7 % (ref 5–12)
NEUTROPHILS # BLD AUTO: 5.78 10*3/MM3 (ref 1.7–7)
NEUTROPHILS NFR BLD AUTO: 74.9 % (ref 42.7–76)
NRBC BLD AUTO-RTO: 0 /100 WBC (ref 0–0.2)
PHOSPHATE SERPL-MCNC: 3.1 MG/DL (ref 2.5–4.5)
PLATELET # BLD AUTO: 180 10*3/MM3 (ref 140–450)
PMV BLD AUTO: 10.3 FL (ref 6–12)
POTASSIUM BLD-SCNC: 4.9 MMOL/L (ref 3.5–5.2)
PROTHROMBIN TIME: 15.3 SECONDS (ref 11.7–14.2)
RBC # BLD AUTO: 3.69 10*6/MM3 (ref 4.14–5.8)
SODIUM BLD-SCNC: 140 MMOL/L (ref 136–145)
WBC NRBC COR # BLD: 7.72 10*3/MM3 (ref 3.4–10.8)

## 2020-05-30 PROCEDURE — 63710000001 INSULIN LISPRO (HUMAN) PER 5 UNITS: Performed by: INTERNAL MEDICINE

## 2020-05-30 PROCEDURE — 82962 GLUCOSE BLOOD TEST: CPT

## 2020-05-30 PROCEDURE — 85610 PROTHROMBIN TIME: CPT | Performed by: INTERNAL MEDICINE

## 2020-05-30 PROCEDURE — 85025 COMPLETE CBC W/AUTO DIFF WBC: CPT | Performed by: INTERNAL MEDICINE

## 2020-05-30 PROCEDURE — 99233 SBSQ HOSP IP/OBS HIGH 50: CPT | Performed by: INTERNAL MEDICINE

## 2020-05-30 PROCEDURE — 80069 RENAL FUNCTION PANEL: CPT | Performed by: INTERNAL MEDICINE

## 2020-05-30 PROCEDURE — 99232 SBSQ HOSP IP/OBS MODERATE 35: CPT | Performed by: INTERNAL MEDICINE

## 2020-05-30 PROCEDURE — 97110 THERAPEUTIC EXERCISES: CPT

## 2020-05-30 PROCEDURE — 63710000001 INSULIN GLARGINE PER 5 UNITS: Performed by: INTERNAL MEDICINE

## 2020-05-30 RX ORDER — MIDODRINE HYDROCHLORIDE 2.5 MG/1
2.5 TABLET ORAL
Status: DISCONTINUED | OUTPATIENT
Start: 2020-05-30 | End: 2020-06-02

## 2020-05-30 RX ADMIN — SODIUM CHLORIDE, POTASSIUM CHLORIDE, SODIUM LACTATE AND CALCIUM CHLORIDE 30 ML/HR: 600; 310; 30; 20 INJECTION, SOLUTION INTRAVENOUS at 06:27

## 2020-05-30 RX ADMIN — MIDODRINE HYDROCHLORIDE 2.5 MG: 2.5 TABLET ORAL at 17:50

## 2020-05-30 RX ADMIN — TAMSULOSIN HYDROCHLORIDE 0.4 MG: 0.4 CAPSULE ORAL at 20:50

## 2020-05-30 RX ADMIN — INSULIN LISPRO 3 UNITS: 100 INJECTION, SOLUTION INTRAVENOUS; SUBCUTANEOUS at 12:36

## 2020-05-30 RX ADMIN — FERROUS SULFATE TAB 325 MG (65 MG ELEMENTAL FE) 325 MG: 325 (65 FE) TAB at 09:30

## 2020-05-30 RX ADMIN — ASPIRIN 81 MG: 81 TABLET, COATED ORAL at 06:28

## 2020-05-30 RX ADMIN — METOPROLOL SUCCINATE 25 MG: 25 TABLET, EXTENDED RELEASE ORAL at 09:32

## 2020-05-30 RX ADMIN — ATORVASTATIN CALCIUM 40 MG: 20 TABLET, FILM COATED ORAL at 20:50

## 2020-05-30 RX ADMIN — FOLIC ACID 1 MG: 1 TABLET ORAL at 09:31

## 2020-05-30 RX ADMIN — FERROUS SULFATE TAB 325 MG (65 MG ELEMENTAL FE) 325 MG: 325 (65 FE) TAB at 17:50

## 2020-05-30 RX ADMIN — VITAMIN D, TAB 1000IU (100/BT) 1000 UNITS: 25 TAB at 09:30

## 2020-05-30 RX ADMIN — MIDODRINE HYDROCHLORIDE 2.5 MG: 2.5 TABLET ORAL at 12:36

## 2020-05-30 RX ADMIN — INSULIN LISPRO 3 UNITS: 100 INJECTION, SOLUTION INTRAVENOUS; SUBCUTANEOUS at 08:13

## 2020-05-30 RX ADMIN — BUPROPION HYDROCHLORIDE 150 MG: 150 TABLET, FILM COATED, EXTENDED RELEASE ORAL at 09:30

## 2020-05-30 RX ADMIN — FERROUS SULFATE TAB 325 MG (65 MG ELEMENTAL FE) 325 MG: 325 (65 FE) TAB at 12:36

## 2020-05-30 RX ADMIN — INSULIN LISPRO 3 UNITS: 100 INJECTION, SOLUTION INTRAVENOUS; SUBCUTANEOUS at 17:50

## 2020-05-30 RX ADMIN — Medication 1000 MCG: at 09:32

## 2020-05-30 RX ADMIN — LIDOCAINE 1 PATCH: 50 PATCH TOPICAL at 09:31

## 2020-05-30 RX ADMIN — INSULIN GLARGINE 15 UNITS: 100 INJECTION, SOLUTION SUBCUTANEOUS at 07:33

## 2020-05-30 RX ADMIN — LEVOTHYROXINE SODIUM 75 MCG: 75 TABLET ORAL at 09:31

## 2020-05-31 ENCOUNTER — PREP FOR SURGERY (OUTPATIENT)
Dept: OTHER | Facility: HOSPITAL | Age: 65
End: 2020-05-31

## 2020-05-31 DIAGNOSIS — Z86.010 HISTORY OF COLON POLYPS: ICD-10-CM

## 2020-05-31 DIAGNOSIS — D64.9 ANEMIA: Primary | ICD-10-CM

## 2020-05-31 PROBLEM — Z86.0100 HISTORY OF COLON POLYPS: Status: ACTIVE | Noted: 2020-05-25

## 2020-05-31 LAB
ALBUMIN SERPL-MCNC: 2.8 G/DL (ref 3.5–5.2)
ANION GAP SERPL CALCULATED.3IONS-SCNC: 5 MMOL/L (ref 5–15)
BUN BLD-MCNC: 41 MG/DL (ref 8–23)
BUN/CREAT SERPL: 23 (ref 7–25)
CALCIUM SPEC-SCNC: 8.2 MG/DL (ref 8.6–10.5)
CHLORIDE SERPL-SCNC: 108 MMOL/L (ref 98–107)
CO2 SERPL-SCNC: 23 MMOL/L (ref 22–29)
CREAT BLD-MCNC: 1.78 MG/DL (ref 0.76–1.27)
GFR SERPL CREATININE-BSD FRML MDRD: 39 ML/MIN/1.73
GLUCOSE BLD-MCNC: 109 MG/DL (ref 65–99)
GLUCOSE BLDC GLUCOMTR-MCNC: 112 MG/DL (ref 70–130)
GLUCOSE BLDC GLUCOMTR-MCNC: 175 MG/DL (ref 70–130)
GLUCOSE BLDC GLUCOMTR-MCNC: 242 MG/DL (ref 70–130)
GLUCOSE BLDC GLUCOMTR-MCNC: 279 MG/DL (ref 70–130)
INR PPP: 1.17 (ref 0.9–1.1)
MAGNESIUM SERPL-MCNC: 2 MG/DL (ref 1.6–2.4)
PHOSPHATE SERPL-MCNC: 3.1 MG/DL (ref 2.5–4.5)
POTASSIUM BLD-SCNC: 5.1 MMOL/L (ref 3.5–5.2)
PROTHROMBIN TIME: 14.6 SECONDS (ref 11.7–14.2)
SODIUM BLD-SCNC: 136 MMOL/L (ref 136–145)

## 2020-05-31 PROCEDURE — 63710000001 INSULIN GLARGINE PER 5 UNITS: Performed by: INTERNAL MEDICINE

## 2020-05-31 PROCEDURE — 99232 SBSQ HOSP IP/OBS MODERATE 35: CPT | Performed by: INTERNAL MEDICINE

## 2020-05-31 PROCEDURE — 99232 SBSQ HOSP IP/OBS MODERATE 35: CPT | Performed by: NURSE PRACTITIONER

## 2020-05-31 PROCEDURE — 63710000001 INSULIN LISPRO (HUMAN) PER 5 UNITS: Performed by: INTERNAL MEDICINE

## 2020-05-31 PROCEDURE — 83735 ASSAY OF MAGNESIUM: CPT | Performed by: INTERNAL MEDICINE

## 2020-05-31 PROCEDURE — 85610 PROTHROMBIN TIME: CPT | Performed by: INTERNAL MEDICINE

## 2020-05-31 PROCEDURE — 82962 GLUCOSE BLOOD TEST: CPT

## 2020-05-31 PROCEDURE — 80069 RENAL FUNCTION PANEL: CPT | Performed by: INTERNAL MEDICINE

## 2020-05-31 RX ADMIN — ATORVASTATIN CALCIUM 40 MG: 20 TABLET, FILM COATED ORAL at 20:51

## 2020-05-31 RX ADMIN — METOPROLOL SUCCINATE 25 MG: 25 TABLET, EXTENDED RELEASE ORAL at 09:05

## 2020-05-31 RX ADMIN — Medication 1000 MCG: at 09:05

## 2020-05-31 RX ADMIN — FERROUS SULFATE TAB 325 MG (65 MG ELEMENTAL FE) 325 MG: 325 (65 FE) TAB at 09:04

## 2020-05-31 RX ADMIN — BUPROPION HYDROCHLORIDE 150 MG: 150 TABLET, FILM COATED, EXTENDED RELEASE ORAL at 09:03

## 2020-05-31 RX ADMIN — TAMSULOSIN HYDROCHLORIDE 0.4 MG: 0.4 CAPSULE ORAL at 20:51

## 2020-05-31 RX ADMIN — ASPIRIN 81 MG: 81 TABLET, COATED ORAL at 06:34

## 2020-05-31 RX ADMIN — VITAMIN D, TAB 1000IU (100/BT) 1000 UNITS: 25 TAB at 09:03

## 2020-05-31 RX ADMIN — INSULIN GLARGINE 15 UNITS: 100 INJECTION, SOLUTION SUBCUTANEOUS at 07:29

## 2020-05-31 RX ADMIN — LEVOTHYROXINE SODIUM 75 MCG: 75 TABLET ORAL at 09:05

## 2020-05-31 RX ADMIN — POLYETHYLENE GLYCOL 3350 119 G: 17 POWDER, FOR SOLUTION ORAL at 18:32

## 2020-05-31 RX ADMIN — FOLIC ACID 1 MG: 1 TABLET ORAL at 09:04

## 2020-05-31 RX ADMIN — FERROUS SULFATE TAB 325 MG (65 MG ELEMENTAL FE) 325 MG: 325 (65 FE) TAB at 11:39

## 2020-05-31 RX ADMIN — MIDODRINE HYDROCHLORIDE 2.5 MG: 2.5 TABLET ORAL at 06:34

## 2020-05-31 RX ADMIN — LIDOCAINE 1 PATCH: 50 PATCH TOPICAL at 09:05

## 2020-05-31 RX ADMIN — MIDODRINE HYDROCHLORIDE 2.5 MG: 2.5 TABLET ORAL at 17:21

## 2020-05-31 RX ADMIN — MIDODRINE HYDROCHLORIDE 2.5 MG: 2.5 TABLET ORAL at 11:40

## 2020-05-31 RX ADMIN — INSULIN LISPRO 3 UNITS: 100 INJECTION, SOLUTION INTRAVENOUS; SUBCUTANEOUS at 17:21

## 2020-05-31 RX ADMIN — INSULIN LISPRO 4 UNITS: 100 INJECTION, SOLUTION INTRAVENOUS; SUBCUTANEOUS at 11:39

## 2020-05-31 RX ADMIN — FERROUS SULFATE TAB 325 MG (65 MG ELEMENTAL FE) 325 MG: 325 (65 FE) TAB at 17:20

## 2020-06-01 ENCOUNTER — ANESTHESIA (OUTPATIENT)
Dept: GASTROENTEROLOGY | Facility: HOSPITAL | Age: 65
End: 2020-06-01

## 2020-06-01 ENCOUNTER — ANESTHESIA EVENT (OUTPATIENT)
Dept: GASTROENTEROLOGY | Facility: HOSPITAL | Age: 65
End: 2020-06-01

## 2020-06-01 LAB
ANION GAP SERPL CALCULATED.3IONS-SCNC: 7 MMOL/L (ref 5–15)
BUN BLD-MCNC: 39 MG/DL (ref 8–23)
BUN/CREAT SERPL: 23.8 (ref 7–25)
CALCIUM SPEC-SCNC: 8.5 MG/DL (ref 8.6–10.5)
CHLORIDE SERPL-SCNC: 106 MMOL/L (ref 98–107)
CO2 SERPL-SCNC: 24 MMOL/L (ref 22–29)
CREAT BLD-MCNC: 1.64 MG/DL (ref 0.76–1.27)
CYTO UR: NORMAL
GFR SERPL CREATININE-BSD FRML MDRD: 42 ML/MIN/1.73
GLUCOSE BLD-MCNC: 114 MG/DL (ref 65–99)
GLUCOSE BLDC GLUCOMTR-MCNC: 209 MG/DL (ref 70–130)
GLUCOSE BLDC GLUCOMTR-MCNC: 278 MG/DL (ref 70–130)
GLUCOSE BLDC GLUCOMTR-MCNC: 89 MG/DL (ref 70–130)
GLUCOSE BLDC GLUCOMTR-MCNC: 95 MG/DL (ref 70–130)
INR PPP: 1.11 (ref 0.9–1.1)
LAB AP CASE REPORT: NORMAL
LAB AP CLINICAL INFORMATION: NORMAL
PATH REPORT.FINAL DX SPEC: NORMAL
PATH REPORT.GROSS SPEC: NORMAL
POTASSIUM BLD-SCNC: 5.2 MMOL/L (ref 3.5–5.2)
PROTHROMBIN TIME: 14 SECONDS (ref 11.7–14.2)
SODIUM BLD-SCNC: 137 MMOL/L (ref 136–145)

## 2020-06-01 PROCEDURE — 0DBK8ZZ EXCISION OF ASCENDING COLON, VIA NATURAL OR ARTIFICIAL OPENING ENDOSCOPIC: ICD-10-PCS | Performed by: INTERNAL MEDICINE

## 2020-06-01 PROCEDURE — 80048 BASIC METABOLIC PNL TOTAL CA: CPT | Performed by: INTERNAL MEDICINE

## 2020-06-01 PROCEDURE — 85610 PROTHROMBIN TIME: CPT | Performed by: INTERNAL MEDICINE

## 2020-06-01 PROCEDURE — 99232 SBSQ HOSP IP/OBS MODERATE 35: CPT | Performed by: NURSE PRACTITIONER

## 2020-06-01 PROCEDURE — 63710000001 INSULIN LISPRO (HUMAN) PER 5 UNITS: Performed by: INTERNAL MEDICINE

## 2020-06-01 PROCEDURE — 45385 COLONOSCOPY W/LESION REMOVAL: CPT | Performed by: INTERNAL MEDICINE

## 2020-06-01 PROCEDURE — 88305 TISSUE EXAM BY PATHOLOGIST: CPT | Performed by: INTERNAL MEDICINE

## 2020-06-01 PROCEDURE — 82962 GLUCOSE BLOOD TEST: CPT

## 2020-06-01 PROCEDURE — 45381 COLONOSCOPY SUBMUCOUS NJX: CPT | Performed by: INTERNAL MEDICINE

## 2020-06-01 PROCEDURE — 25010000002 PROPOFOL 10 MG/ML EMULSION: Performed by: ANESTHESIOLOGY

## 2020-06-01 DEVICE — DEV CLIP ENDO RESOLUTION360 CONTRL ROT 235CM: Type: IMPLANTABLE DEVICE | Site: ASCENDING COLON | Status: FUNCTIONAL

## 2020-06-01 RX ORDER — SODIUM CHLORIDE 9 MG/ML
1000 INJECTION, SOLUTION INTRAVENOUS CONTINUOUS
Status: DISCONTINUED | OUTPATIENT
Start: 2020-06-01 | End: 2020-06-01

## 2020-06-01 RX ORDER — PROPOFOL 10 MG/ML
VIAL (ML) INTRAVENOUS CONTINUOUS PRN
Status: DISCONTINUED | OUTPATIENT
Start: 2020-06-01 | End: 2020-06-01 | Stop reason: SURG

## 2020-06-01 RX ORDER — PROPOFOL 10 MG/ML
VIAL (ML) INTRAVENOUS AS NEEDED
Status: DISCONTINUED | OUTPATIENT
Start: 2020-06-01 | End: 2020-06-01 | Stop reason: SURG

## 2020-06-01 RX ORDER — LIDOCAINE HYDROCHLORIDE 20 MG/ML
INJECTION, SOLUTION INFILTRATION; PERINEURAL AS NEEDED
Status: DISCONTINUED | OUTPATIENT
Start: 2020-06-01 | End: 2020-06-01 | Stop reason: SURG

## 2020-06-01 RX ADMIN — PROPOFOL 100 MG: 10 INJECTION, EMULSION INTRAVENOUS at 10:45

## 2020-06-01 RX ADMIN — INSULIN LISPRO 3 UNITS: 100 INJECTION, SOLUTION INTRAVENOUS; SUBCUTANEOUS at 17:25

## 2020-06-01 RX ADMIN — LEVOTHYROXINE SODIUM 75 MCG: 75 TABLET ORAL at 08:29

## 2020-06-01 RX ADMIN — LIDOCAINE 1 PATCH: 50 PATCH TOPICAL at 08:29

## 2020-06-01 RX ADMIN — MIDODRINE HYDROCHLORIDE 2.5 MG: 2.5 TABLET ORAL at 12:02

## 2020-06-01 RX ADMIN — FERROUS SULFATE TAB 325 MG (65 MG ELEMENTAL FE) 325 MG: 325 (65 FE) TAB at 12:02

## 2020-06-01 RX ADMIN — MIDODRINE HYDROCHLORIDE 2.5 MG: 2.5 TABLET ORAL at 08:29

## 2020-06-01 RX ADMIN — PROPOFOL 100 MCG/KG/MIN: 10 INJECTION, EMULSION INTRAVENOUS at 10:45

## 2020-06-01 RX ADMIN — POLYETHYLENE GLYCOL 3350 119 G: 17 POWDER, FOR SOLUTION ORAL at 05:26

## 2020-06-01 RX ADMIN — LIDOCAINE HYDROCHLORIDE 60 MG: 20 INJECTION, SOLUTION INFILTRATION; PERINEURAL at 10:45

## 2020-06-01 RX ADMIN — FERROUS SULFATE TAB 325 MG (65 MG ELEMENTAL FE) 325 MG: 325 (65 FE) TAB at 17:24

## 2020-06-01 RX ADMIN — SODIUM CHLORIDE 1000 ML: 9 INJECTION, SOLUTION INTRAVENOUS at 10:04

## 2020-06-01 RX ADMIN — MIDODRINE HYDROCHLORIDE 2.5 MG: 2.5 TABLET ORAL at 17:25

## 2020-06-01 RX ADMIN — ATORVASTATIN CALCIUM 40 MG: 20 TABLET, FILM COATED ORAL at 21:18

## 2020-06-01 RX ADMIN — METOPROLOL SUCCINATE 25 MG: 25 TABLET, EXTENDED RELEASE ORAL at 08:29

## 2020-06-01 RX ADMIN — TAMSULOSIN HYDROCHLORIDE 0.4 MG: 0.4 CAPSULE ORAL at 21:18

## 2020-06-01 NOTE — PROGRESS NOTES
Continued Stay Note  [facility]     Patient Name: Carlito Mo  MRN: [id number]  Today's Date: 6/1/2020    Admit Date: 5/25/2020    Discharge Plan     Row Name 06/01/20 1502       Plan    Plan  Home with [facility]    Plan Comments  CCP met with patient at bedside for follow up. Patient stated his plan remains to return home with [facility]. Patient denies any discharge needs at this time. CCP will continue to follow for needs. Jaclyn GUERRIER        Discharge Codes    No documentation.             JEANETTE Lewis

## 2020-06-01 NOTE — PROGRESS NOTES
"    Patient Name: Carlito Mo  :1955  65 y.o.      Patient Care Team:  Florencia Caballero MD as PCP - General (Internal Medicine)  Amber Murguia MD as Consulting Physician (Cardiology)  Sera La [facility] as Pharmacist  Seth Ladd MD as Surgeon (General Surgery)  Kim Hoyos MD PhD as Consulting Physician (Hematology and Oncology)  Jerome Diallo MD as Referring Physician (Family Medicine)    Chief Complaint: follow up chest pain, orthostasis    Interval History: he is just back from endoscopy. He feels wiped out.        Objective   Vital Signs  Temp:  [97.7 °F (36.5 °C)-98 °F (36.7 °C)] 97.7 °F (36.5 °C)  Heart Rate:  [55-87] 57  Resp:  [12-18] 16  BP: ()/(45-84) 140/84    Intake/Output Summary (Last 24 hours) at 2020 1450  Last data filed at 2020 0734  Gross per 24 hour   Intake --   Output 1045 ml   Net -1045 ml     Flowsheet Rows      First Filed Value   Admission Height  180.3 cm (71\") Documented at 2020 1547   Admission Weight  74.8 kg (165 lb) Documented at 2020 1547          Physical Exam:   General Appearance:    Alert, cooperative, in no acute distress   Lungs:     Clear to auscultation.  Normal respiratory effort and rate.      Heart:    Regular rhythm and normal rate, normal S1 and S2, no murmurs, gallops or rubs.     Chest Wall:    No abnormalities observed   Abdomen:     Soft, nontender, positive bowel sounds.     Extremities:   no cyanosis, clubbing or edema.  No marked joint deformities.  Adequate musculoskeletal strength.       Results Review:    Results from last 7 days   Lab Units 20  0625   SODIUM mmol/L 137   POTASSIUM mmol/L 5.2   CHLORIDE mmol/L 106   CO2 mmol/L 24.0   BUN mg/dL 39*   CREATININE mg/dL 1.64*   GLUCOSE mg/dL 114*   CALCIUM mg/dL 8.5*     Results from last 7 days   Lab Units 20  0603 20  1557   TROPONIN T ng/mL 0.065* 0.104*     Results from last 7 days   Lab Units 20  0643   WBC 10*3/mm3 7.72 "   HEMOGLOBIN g/dL 10.1*   HEMATOCRIT % 30.6*   PLATELETS 10*3/mm3 180     Results from last 7 days   Lab Units 06/01/20  0625 05/31/20  0632 05/30/20  0643   INR  1.11* 1.17* 1.24*     Results from last 7 days   Lab Units 05/31/20  0632   MAGNESIUM mg/dL 2.0                   Medication Review:     aspirin 81 mg Oral QAM   atorvastatin 40 mg Oral Nightly   buPROPion  mg Oral Daily   cholecalciferol 1,000 Units Oral Daily   ferrous sulfate 325 mg Oral TID With Meals   folic acid 1 mg Oral Daily   insulin glargine 15 Units Subcutaneous QAM   insulin lispro 0-7 Units Subcutaneous TID AC   levothyroxine 75 mcg Oral Daily   lidocaine 1 patch Transdermal Q24H   metoprolol succinate XL 25 mg Oral Q24H   midodrine 2.5 mg Oral TID AC   tamsulosin 0.4 mg Oral Nightly   vitamin B-12 1,000 mcg Oral Daily          Pharmacy to dose warfarin     sodium chloride 1,000 mL Last Rate: 1,000 mL (06/01/20 1004)       Assessment/Plan   1. Elevated troponin - this is chronic. EKG nonischemic. Stress one year ago with apical infarct, no ischemia, echocardiogram this admission with wall motion abnormality consistent with this.     Given comorbidities, favor medical therapy. We had to stop amlodipine. Nitrate would worsen ortho stasis. Symptoms are pretty stable at this point. Continue beta blocker.      2. SYLVAIN on chronic kidney disease - nephrology is following.      3. Falls/weakness/fatigue/weight loss - still with SBP 80's with position change when working with physical therapy. He was symptomatic.       4. Diabetes mellitus type II - could be an autonomic component to ortho stasis.     5. History of stroke , small PFO. On warfarin which is on hold.     6. Obstructive sleep apnea - cpap     7. Chronic combined systolic and diastolic heart failure - compensated without diuretic.  Monitor. He follows with [place] [facility].     8. History of coronary artery disease - remote bypass 2001. On aspirin/statin.      9.  Hypertension with orthostatic hypotension - He is normotensive when supine and still seems orthostatic. Midodrine was added. Remains orthostatic today, although likely volume depleted from colon prep. Continue same and reassess tomorrow. He is getting one liter fluid bolus.     10. Anemia, positive heme occult stool - EGD with esophagitis. Colonoscopy with one polyp which was resected. No source of bleeding. Outpatient capsule study recommended.     JI Mcmullen  [facility]  06/01/20  14:50

## 2020-06-01 NOTE — PROGRESS NOTES
"   LOS: 6 days    Patient Care Team:  Florencia Caballero MD as PCP - General (Internal Medicine)  Amber Murguia MD as Consulting Physician (Cardiology)  [facility] as Pharmacist  Seth Ladd MD as Surgeon (General Surgery)  Kim Hoyos MD PhD as Consulting Physician (Hematology and Oncology)  Jerome Diallo MD as Referring Physician (Family Medicine)    Chief Complaint:    Chief Complaint   Patient presents with   • Fall   • Weakness - Generalized     Follow UP SYLVAIN CKD3  Subjective     Interval History:  Anxious about colonoscopy but o/w feels well; no dyspnea       Objective     Vital Signs  Temp:  [97.7 °F (36.5 °C)-98.2 °F (36.8 °C)] 97.7 °F (36.5 °C)  Heart Rate:  [56-87] 67  Resp:  [16-18] 16  BP: ()/(45-78) 127/76    Flowsheet Rows      First Filed Value   Admission Height  180.3 cm (71\") Documented at 05/25/2020 1547   Admission Weight  74.8 kg (165 lb) Documented at 05/25/2020 1547          I/O this shift:  In: -   Out: 120 [Urine:120]  I/O last 3 completed shifts:  In: 720 [P.O.:720]  Out: 1875 [Urine:1875]    Intake/Output Summary (Last 24 hours) at 6/1/2020 0809  Last data filed at 6/1/2020 0734  Gross per 24 hour   Intake 480 ml   Output 1345 ml   Net -865 ml       Physical Exam:  GEN NAD, alert, comfortable  Neck supple no JVD  Lungs CTA bilat no rales  CV RRR no m/g  abd soft NT/ND  vasc no pedal/ankle edema        Results Review:    Results from last 7 days   Lab Units 06/01/20  0625 05/31/20  0632 05/30/20  0643  05/27/20  0608 05/26/20  0603 05/25/20  1557   SODIUM mmol/L 137 136 140   < > 141 139 134*   POTASSIUM mmol/L 5.2 5.1 4.9   < > 4.0 4.3 4.7   CHLORIDE mmol/L 106 108* 107   < > 109* 107 99   CO2 mmol/L 24.0 23.0 25.3   < > 24.7 24.9 25.3   BUN mg/dL 39* 41* 33*   < > 35* 51* 60*   CREATININE mg/dL 1.64* 1.78* 1.61*   < > 1.57* 1.95* 2.59*   CALCIUM mg/dL 8.5* 8.2* 8.6   < > 8.4* 8.7 8.8   BILIRUBIN mg/dL  --   --   --   --  0.2 0.2 0.3   ALK PHOS U/L  --   " --   --   --  56 61 72   ALT (SGPT) U/L  --   --   --   --  41 46* 54*   AST (SGOT) U/L  --   --   --   --  19 15 15   GLUCOSE mg/dL 114* 109* 232*   < > 60* 78 398*    < > = values in this interval not displayed.       Estimated Creatinine Clearance: 50.6 mL/min (A) (by C-G formula based on SCr of 1.64 mg/dL (H)).    Results from last 7 days   Lab Units 05/31/20  0632 05/30/20  0643 05/29/20  0704  05/27/20  0608 05/25/20  1557   MAGNESIUM mg/dL 2.0  --   --   --  2.1 2.1   PHOSPHORUS mg/dL 3.1 3.1 3.4   < > 3.0  --     < > = values in this interval not displayed.             Results from last 7 days   Lab Units 05/30/20  0643 05/29/20  0704 05/28/20  0653 05/27/20  0608 05/26/20  0603   WBC 10*3/mm3 7.72 7.02 6.90 5.93 8.94   HEMOGLOBIN g/dL 10.1* 9.7* 9.9* 10.5* 10.9*   PLATELETS 10*3/mm3 180 168 185 205 221       Results from last 7 days   Lab Units 06/01/20  0625 05/31/20  0632 05/30/20  0643 05/29/20  0704 05/28/20  0653   INR  1.11* 1.17* 1.24* 1.50* 2.04*         Imaging Results (Last 24 Hours)     ** No results found for the last 24 hours. **          aspirin 81 mg Oral QAM   atorvastatin 40 mg Oral Nightly   buPROPion  mg Oral Daily   cholecalciferol 1,000 Units Oral Daily   ferrous sulfate 325 mg Oral TID With Meals   folic acid 1 mg Oral Daily   insulin glargine 15 Units Subcutaneous QAM   insulin lispro 0-7 Units Subcutaneous TID AC   levothyroxine 75 mcg Oral Daily   lidocaine 1 patch Transdermal Q24H   metoprolol succinate XL 25 mg Oral Q24H   midodrine 2.5 mg Oral TID AC   tamsulosin 0.4 mg Oral Nightly   vitamin B-12 1,000 mcg Oral Daily       Pharmacy to dose warfarin        Medication Review:   Current Facility-Administered Medications   Medication Dose Route Frequency Provider Last Rate Last Dose   • aspirin EC tablet 81 mg  81 mg Oral QAM Amanda Lovelace MD   Stopped at 06/01/20 0700   • atorvastatin (LIPITOR) tablet 40 mg  40 mg Oral Nightly Amanda Lovelace MD   40 mg at 05/31/20 2051    • buPROPion XL (WELLBUTRIN XL) 24 hr tablet 150 mg  150 mg Oral Daily Amanda Lovelace MD   150 mg at 05/31/20 0903   • cholecalciferol (VITAMIN D3) tablet 1,000 Units  1,000 Units Oral Daily Amanda Lovelace MD   1,000 Units at 05/31/20 0903   • dextrose (D50W) 25 g/ 50mL Intravenous Solution 25 g  25 g Intravenous Q15 Min PRN Amanda Lovelace MD   25 g at 05/29/20 1723   • dextrose (GLUTOSE) oral gel 15 g  15 g Oral Q15 Min PRN Amanda Lovelace MD       • ferrous sulfate tablet 325 mg  325 mg Oral TID With Meals Amanda Lovelace MD   325 mg at 05/31/20 1720   • folic acid (FOLVITE) tablet 1 mg  1 mg Oral Daily Amanda Lovelace MD   1 mg at 05/31/20 0904   • glucagon (human recombinant) (GLUCAGEN DIAGNOSTIC) injection 1 mg  1 mg Subcutaneous Q15 Min PRN Amanda Lovelace MD   1 mg at 05/29/20 1153   • insulin glargine (LANTUS) injection 15 Units  15 Units Subcutaneous QAM Amanda Lovelace MD   Stopped at 06/01/20 0805   • insulin lispro (humaLOG) injection 0-7 Units  0-7 Units Subcutaneous TID AC Amanda Lovelace MD   3 Units at 05/31/20 1721   • levothyroxine (SYNTHROID, LEVOTHROID) tablet 75 mcg  75 mcg Oral Daily Amanda Lovelace MD   75 mcg at 05/31/20 0905   • lidocaine (LIDODERM) 5 % 1 patch  1 patch Transdermal Q24H Amanda Lovelace MD   1 patch at 05/31/20 0905   • lidocaine (XYLOCAINE) 5 % ointment   Topical PRN Amanda Lovelace MD       • metoprolol succinate XL (TOPROL-XL) 24 hr tablet 25 mg  25 mg Oral Q24H Amanda Lovelace MD   25 mg at 05/31/20 0905   • midodrine (PROAMATINE) tablet 2.5 mg  2.5 mg Oral TID AC Christiano Badillo MD   2.5 mg at 05/31/20 1721   • nitroglycerin (NITROSTAT) SL tablet 0.4 mg  0.4 mg Sublingual Q5 Min PRN Amanda Lovelace MD       • ondansetron (ZOFRAN) injection 4 mg  4 mg Intravenous Q6H PRN Amanda Lovelace MD       • Pharmacy to dose warfarin   Does not apply Continuous PRN Amanda Lovelace MD       • sodium chloride 0.9 % flush 10 mL  10 mL Intravenous PRN Amanda Lovelace,  MD   10 mL at 05/29/20 0922   • tamsulosin (FLOMAX) 24 hr capsule 0.4 mg  0.4 mg Oral Nightly Amanda Lovelace MD   0.4 mg at 05/31/20 2051   • vitamin B-12 (CYANOCOBALAMIN) tablet 1,000 mcg  1,000 mcg Oral Daily Amanda Lovelace MD   1,000 mcg at 05/31/20 0905       Assessment/Plan   1.  SYLVAIN on CKD3: nonoliguric, likely due to hypotension and hypovolemia. improved, Cr stable 1.6.  Followed by Dr. rByan Huddleston in our office.  ACE inhibitor is on hold.  K low 5's.    2.  Progressive weakness and recent falls; has lost 30 pounds over the last 6 months  3.  Anemia: last iron stores look good on 4/7/2020.  Heme-positive stools, peptic ulcer disease on EGD; hb 10.1  4.  PVD with prior strokes on AC  5.  DM2 with early DR; poor control by A1c  6.  Orthostatic hypotension.  BP stable on low dose midodrine.  Compression stockings ordered but not in place yet; on low dose toprol XL  7.  Ischemic cardiomyopathy - compensated w/o diuretic     Plan  - colonoscopy today  - change boost supplement to renvela due to mild hyperkalemia       SYLVAIN (acute kidney injury) (CMS/HCC)    Chronic ischemic heart disease    CKD (chronic kidney disease) stage 3, GFR 30-59 ml/min (CMS/HCC)    Acquired hypothyroidism    Recurrent strokes (CMS/HCC)    Chronic combined systolic and diastolic congestive heart failure (CMS/HCC)    YAW on CPAP    Elevated troponin    Anemia, chronic renal failure, stage 3 (moderate) (CMS/HCC)    Essential hypertension    Acute on chronic renal insufficiency    Generalized weakness    Falls frequently    Type 2 diabetes mellitus with hyperglycemia (CMS/HCC)    Acute pain of right shoulder    Lower abdominal pain    Acute on chronic anemia    Heme positive stool    Neurogenic orthostatic hypotension (CMS/HCC)    Anemia    History of colon polyps              Jose Enrique Montelongo MD  06/01/20  08:09

## 2020-06-01 NOTE — NURSING NOTE
Spoke with Tracy Nicole.  She is aware patient needs to be fitted for Jobst stocking.  CASSIE Salazar RN

## 2020-06-01 NOTE — PROGRESS NOTES
Name: Carlito Mo ADMIT: 2020   : 1955  PCP: Florencia Caballero MD    MRN: [id number] LOS: 6 days   AGE/SEX: 65 y.o. male  ROOM: [facility]/     Subjective   Subjective   CC: generalized weakness, light-headedness on standing  No acute events. Patient has no new complaints. Taking PO. No CP/dyspnea/f/c/n/v/d.  No melena or hematochezia. Had colonoscopy today and tolerated well.    Objective   Objective   Vital Signs  Temp:  [97.7 °F (36.5 °C)-98 °F (36.7 °C)] 97.7 °F (36.5 °C)  Heart Rate:  [55-87] 57  Resp:  [12-18] 16  BP: ()/(50-84) 140/84  SpO2:  [94 %-100 %] 97 %  on  Flow (L/min):  [2] 2;   Device (Oxygen Therapy): nasal cannula  Body mass index is 24.49 kg/m².  Physical Exam   Constitutional: He is oriented to person, place, and time. He has a sickly appearance. No distress.   HENT:   Head: Normocephalic and atraumatic.   Mouth/Throat: Oropharynx is clear and moist.   Eyes: Pupils are equal, round, and reactive to light. Conjunctivae and EOM are normal.   Neck: Neck supple. No JVD present.   Cardiovascular: Normal rate, normal heart sounds and intact distal pulses.   Pulmonary/Chest: Effort normal and breath sounds normal. No respiratory distress.   Abdominal: Soft. Bowel sounds are normal. He exhibits no distension. There is no tenderness.   Musculoskeletal: He exhibits no edema or deformity.   Neurological: He is alert and oriented to person, place, and time.   Skin: Skin is warm and dry. He is not diaphoretic.   Psychiatric: He has a normal mood and affect. His behavior is normal.   Nursing note and vitals reviewed.     Results Review:       I reviewed the patient's new clinical results.  I reviewed the patient's telemetry.  Results from last 7 days   Lab Units 20  0643 20  0704 20  0653 20  0608   WBC 10*3/mm3 7.72 7.02 6.90 5.93   HEMOGLOBIN g/dL 10.1* 9.7* 9.9* 10.5*   PLATELETS 10*3/mm3 180 168 185 205     Results from last 7 days   Lab Units 20  0629  05/31/20 0632 05/30/20  0643 05/29/20  0704   SODIUM mmol/L 137 136 140 136   POTASSIUM mmol/L 5.2 5.1 4.9 4.5   CHLORIDE mmol/L 106 108* 107 107   CO2 mmol/L 24.0 23.0 25.3 23.1   BUN mg/dL 39* 41* 33* 37*   CREATININE mg/dL 1.64* 1.78* 1.61* 1.70*   GLUCOSE mg/dL 114* 109* 232* 131*   Estimated Creatinine Clearance: 50.6 mL/min (A) (by C-G formula based on SCr of 1.64 mg/dL (H)).  Results from last 7 days   Lab Units 05/31/20 0632 05/30/20 0643 05/29/20  0704 05/28/20  0653 05/27/20  0608 05/26/20  0603   ALBUMIN g/dL 2.80* 3.00* 2.70* 3.00* 3.10* 3.30*   BILIRUBIN mg/dL  --   --   --   --  0.2 0.2   ALK PHOS U/L  --   --   --   --  56 61   AST (SGOT) U/L  --   --   --   --  19 15   ALT (SGPT) U/L  --   --   --   --  41 46*     Results from last 7 days   Lab Units 06/01/20  0625 05/31/20 0632 05/30/20  0643 05/29/20  0704 05/28/20  0653 05/27/20  0608   CALCIUM mg/dL 8.5* 8.2* 8.6 8.3* 8.5* 8.4*   ALBUMIN g/dL  --  2.80* 3.00* 2.70* 3.00* 3.10*   MAGNESIUM mg/dL  --  2.0  --   --   --  2.1   PHOSPHORUS mg/dL  --  3.1 3.1 3.4 3.2 3.0     Results from last 7 days   Lab Units 05/26/20  0603   LACTATE mmol/L 0.8     Glucose   Date/Time Value Ref Range Status   06/01/2020 1651 209 (H) 70 - 130 mg/dL Final   06/01/2020 1202 95 70 - 130 mg/dL Final   06/01/2020 0555 89 70 - 130 mg/dL Final   05/31/2020 2024 175 (H) 70 - 130 mg/dL Final   05/31/2020 1636 242 (H) 70 - 130 mg/dL Final   05/31/2020 1109 279 (H) 70 - 130 mg/dL Final   05/31/2020 0657 112 70 - 130 mg/dL Final       XR Abdomen KUB  ONE VIEW ABDOMEN     HISTORY: Abdominal pain.     There is a moderate amount of bowel gas in the stomach and to a lesser  extent bowel gas scattered in the colon. There is no evidence of  small-bowel obstruction or significant ileus.     This report was finalized on 5/27/2020 2:47 PM by Dr. Nigel Pearson M.D.     Adult Transthoracic Echo Complete W/ Cont if Necessary Per Protocol  · Left ventricular systolic function is normal.  Calculated EF = 60%. There   is hypokinesis of the distal anteroseptal, distal inferoseptal and the   apex. Diastolic function is indeterminate.  · There is mild concentric hypertrophy of the left ventricle.  · Normal right ventricular cavity size, wall thickness, systolic function  · Left atrial cavity size is mildly dilated.  · No significant valvular abnormality noted.  · Compared to prior study on 12/2/2018 left ventricular ejection fraction   has improved.         aspirin 81 mg Oral QAM   atorvastatin 40 mg Oral Nightly   buPROPion  mg Oral Daily   cholecalciferol 1,000 Units Oral Daily   ferrous sulfate 325 mg Oral TID With Meals   folic acid 1 mg Oral Daily   insulin glargine 15 Units Subcutaneous QAM   insulin lispro 0-7 Units Subcutaneous TID AC   levothyroxine 75 mcg Oral Daily   lidocaine 1 patch Transdermal Q24H   metoprolol succinate XL 25 mg Oral Q24H   midodrine 2.5 mg Oral TID AC   tamsulosin 0.4 mg Oral Nightly   vitamin B-12 1,000 mcg Oral Daily       Pharmacy to dose warfarin     sodium chloride 1,000 mL Last Rate: 1,000 mL (06/01/20 1004)   Diet Regular; Consistent Carbohydrate       Assessment/Plan     Active Hospital Problems    Diagnosis  POA   • **SYLVAIN (acute kidney injury) (CMS/HCC) [N17.9]  Yes   • Neurogenic orthostatic hypotension (CMS/HCC) [G90.3]  Yes   • Acute pain of right shoulder [M25.511]  Yes   • Lower abdominal pain [R10.30]  Yes   • Acute on chronic renal insufficiency [N28.9, N18.9]  Yes   • Generalized weakness [R53.1]  Yes   • Falls frequently [R29.6]  Not Applicable   • Type 2 diabetes mellitus with hyperglycemia (CMS/HCC) [E11.65]  Yes   • Acute on chronic anemia [D64.9]  Yes   • Heme positive stool [R19.5]  Yes   • Anemia [D64.9]  Unknown   • History of colon polyps [Z86.010]  Unknown   • Essential hypertension [I10]  Yes   • Anemia, chronic renal failure, stage 3 (moderate) (CMS/HCC) [N18.3, D63.1]  Yes   • Elevated troponin [R79.89]  Yes   • YAW on CPAP [G47.33,  Z99.89]  Not Applicable   • Chronic combined systolic and diastolic congestive heart failure (CMS/HCC) [I50.42]  Yes   • Recurrent strokes (CMS/HCC) [I63.9]  Yes   • Acquired hypothyroidism [E03.9]  Yes   • CKD (chronic kidney disease) stage 3, GFR 30-59 ml/min (CMS/[facility]) [N18.3]  Yes   • Chronic ischemic heart disease [I25.9]  Yes      Resolved Hospital Problems   No resolved problems to display.   Symptomatic Anemia  - likely source of weakness and falls along with orthostasis  - Hgb stable and no acute bleeding  - FOBT is positive-EGD 5/29 with esophagitis and gastritis (biopsied) as well as small hiatal hernia, continue on BID PPI-will need follow up EGD in 3 months to assess healing  - colonoscopy 6/1/20 with stool in the colon and an ascending colon polyp which was removed  - continue Fe replacement  - appreciate GI recs    SYLVAIN on CKD Stage 3  - pre-renal from hypotension, hypovolemia in the setting of lisinopril and lasix which are held  - creatinine 2.59 on admission, improving-baseline creatinine is 1.8  - appreciate nephrology recs    Orthostatic Hypotension  - persistent despite volume correction and holding amlodipine  - likely due to autonomic neuropathy from diabetes mellitus  - midodrine started-will follow orthostatic blood pressures  - awaiting compression stockings    Ischemic Heart Disease with Chronic Combined Systolic and Diastolic CHF  - no anginal symptoms, continue ASA, statin; troponin is chronically elevated  - off amlodipine due to orthostatic hypotension  - appears euvolemic  - continue on metoprolol  - monitor volume status in the hospital  - appreciate cardiology recs    Type 2 DM  - complications include nephropathy, retinopathy, and neuropathy  - BG improved  - continue 15 units lantus QAM   - cover with ssi/hypoglycemia protocol    SCDs for DVT prophylaxis.  Full code.  Discussed with patient and nursing staff.  Disposition home tomorrow or Wednesday      Eliseo Gross,  MD [facility]  06/01/20  17:49

## 2020-06-01 NOTE — ANESTHESIA POSTPROCEDURE EVALUATION
"Patient: Carlito Mo    Procedure Summary     Date:  06/01/20 Room / Location:  [facility] ENDOSCOPY 1 /  [facility]    Anesthesia Start:  1042 Anesthesia Stop:  1118    Procedure:  COLONOSCOPY (N/A ) Diagnosis:       Colon polyps      (Anemia [D64.9])      (History of colon polyps [Z86.010])    Surgeon:  Jose Enrique Louie MD Provider:  Javad Juares MD    Anesthesia Type:  MAC ASA Status:  4          Anesthesia Type: MAC    Vitals  Vitals Value Taken Time   /69 6/1/2020 11:25 AM   Temp 36.7 °C (98 °F) 6/1/2020 11:17 AM   Pulse 65 6/1/2020 11:25 AM   Resp 14 6/1/2020 11:25 AM   SpO2 100 % 6/1/2020 11:25 AM           Post Anesthesia Care and Evaluation    Patient location during evaluation: PACU  Patient participation: complete - patient participated  Level of consciousness: awake  Pain score: 0  Pain management: adequate  Airway patency: patent  Anesthetic complications: No anesthetic complications  PONV Status: none  Cardiovascular status: acceptable  Respiratory status: acceptable  Hydration status: acceptable    Comments: /69   Pulse 65   Temp 36.7 °C (98 °F) (Oral)   Resp 14   Ht 180.3 cm (71\")   Wt 79.7 kg (175 lb 9.6 oz)   SpO2 100%   BMI 24.49 kg/m²       "

## 2020-06-01 NOTE — PROGRESS NOTES
Pharmacy Consult: Warfarin Dosing/ Monitoring    Carlito Mo is a 65 y.o. male, estimated creatinine clearance is 50.6 mL/min (A) (by C-G formula based on SCr of 1.64 mg/dL (H)). weighing 79.7 kg (175 lb 9.6 oz).     has a past medical history of Acquired hypothyroidism, Acute congestive heart failure (CMS/HCC), Acute respiratory failure with hypoxia (CMS/[facility]), Acute sinusitis, SYLVAIN (acute kidney injury) (CMS/[facility]), Anemia, Arthritis, CAD (coronary artery disease), Cancer (CMS/HCC), Chronic combined systolic and diastolic congestive heart failure (CMS/[facility]), Chronic ischemic heart disease, CKD (chronic kidney disease), COPD (chronic obstructive pulmonary disease) (CMS/[facility]), Depression, Diabetes mellitus type 2, uncontrolled, without complications (CMS/[facility]), Disease of thyroid gland, Fatigue, Frequent PVCs, Heart attack (CMS/[facility]), History of coronary artery disease, History of transfusion, Hyperglycemia, Hyperlipidemia, Hypertension, Hypertensive encephalopathy, Mental status change, YAW on CPAP, Pleural effusion, Pneumonia, RBBB (right bundle branch block), Screening for prostate cancer (2011), Stroke (CMS/[facility]), TIA (transient ischemic attack), Type 2 diabetes mellitus (CMS/[facility]), Urinary retention, and Vitamin D deficiency.    Social History     Tobacco Use    Smoking status: Never Smoker    Smokeless tobacco: Never Used    Tobacco comment: daily caffeine   Substance Use Topics    Alcohol use: No    Drug use: No       Results from last 7 days   Lab Units 06/01/20  0625 05/31/20  0632 05/30/20  0643 05/29/20  0704 05/28/20  0653 05/27/20  0608 05/26/20  0603  05/25/20  1557   INR  1.11* 1.17* 1.24* 1.50* 2.04* 2.12* 2.04*   < > 1.69*   HEMOGLOBIN g/dL  --   --  10.1* 9.7* 9.9* 10.5* 10.9*  --  11.5*   HEMATOCRIT %  --   --  30.6* 29.4* 30.3* 32.3* 32.4*  --  35.8*   PLATELETS 10*3/mm3  --   --  180 168 185 205 221  --  241    < > = values in this interval not displayed.     Results from last 7 days   Lab Units  06/01/20  0625 05/31/20  0632 05/30/20  0643  05/27/20  0608 05/26/20  0603 05/25/20  1557   SODIUM mmol/L 137 136 140   < > 141 139 134*   POTASSIUM mmol/L 5.2 5.1 4.9   < > 4.0 4.3 4.7   CHLORIDE mmol/L 106 108* 107   < > 109* 107 99   CO2 mmol/L 24.0 23.0 25.3   < > 24.7 24.9 25.3   BUN mg/dL 39* 41* 33*   < > 35* 51* 60*   CREATININE mg/dL 1.64* 1.78* 1.61*   < > 1.57* 1.95* 2.59*   CALCIUM mg/dL 8.5* 8.2* 8.6   < > 8.4* 8.7 8.8   BILIRUBIN mg/dL  --   --   --   --  0.2 0.2 0.3   ALK PHOS U/L  --   --   --   --  56 61 72   ALT (SGPT) U/L  --   --   --   --  41 46* 54*   AST (SGOT) U/L  --   --   --   --  19 15 15   GLUCOSE mg/dL 114* 109* 232*   < > 60* 78 398*    < > = values in this interval not displayed.     Anticoagulation history: h/o recurrent strokes anticoagulated on warfarin. Managed by BRENDAN Ramesh, last noted checkup 3/27/20 - INR 2.48, warfarin 8mg M/Th and Warfarin 6mg all other days (also confirmed by patient). Regimen has largely provided therapeutic concentrations over past 6 months.    Hospital Anticoagulation:  Consulting provider: Dr. Awad  Start date: 5/25/20  Indication: Other-full anticoagulation, recurrent strokes  Target INR: 2-3  Expected duration: Long-term   Bridge Therapy: No    Date 5/25 5/26 5/27 5/28 5/29 5/30 5/31 6/1      INR 1.69 2.04 2.12 2.04 1.50 1.24 1.17 1.11     Warfarin dose 8mg (taken pta) 6mg held held held held held held       Potential drug interactions:   Aspirin - may increase risk of bleeding  Levothyroxine - May enhance the anticoagulant effect of warfarin.     Relevant nutrition status: Regular cardiac diet    Other: 5/29 EGD- significant esophagitis     Education complete?/ Date: Not at this time    Assessment/Plan:  Today's INR=1.1 which is below goal range of 2-3. Warfarin is currently being held for C-scope planned for 6/1/20 - last dose received 5/26/20. Spoke to Ofelia Morris with [facility] and she clarified with Dr. Badillo that patient is having a  C-scope on Monday and warfarin will continued to be held until after procedure. Daily INR on order. Clinical Rph to follow-up tomorrow AM with lab value.     Pharmacy will continue to follow until discharge or discontinuation of warfarin.     Thank you for allowing me to participate in the care of your patient,    García De Jesus, PharmD  06/01/20 09:12

## 2020-06-01 NOTE — PLAN OF CARE
Problem: Patient Care Overview  Goal: Plan of Care Review  Outcome: Ongoing (interventions implemented as appropriate)  Flowsheets (Taken 6/1/2020 2751)  Outcome Summary: Pt is alert and oriented.  He has not c/o pain.  Colonoscopy completed this am.  Still have dark liquid stools.  VSS.  [employer] contacted to get jobst stockings ordered for patient.  Will continue to monitor patient.  Goal: Individualization and Mutuality  Outcome: Ongoing (interventions implemented as appropriate)  Goal: Discharge Needs Assessment  Outcome: Ongoing (interventions implemented as appropriate)  Goal: Interprofessional Rounds/Family Conf  Outcome: Ongoing (interventions implemented as appropriate)     Problem: Fall Risk (Adult)  Goal: Absence of Fall  Outcome: Ongoing (interventions implemented as appropriate)     Problem: Renal Failure/Kidney Injury, Acute (Adult)  Goal: Signs and Symptoms of Listed Potential Problems Will be Absent, Minimized or Managed (Renal Failure/Kidney Injury, Acute)  Outcome: Ongoing (interventions implemented as appropriate)     Problem: Pain, Acute (Adult)  Goal: Acceptable Pain Control/Comfort Level  Outcome: Ongoing (interventions implemented as appropriate)

## 2020-06-01 NOTE — ANESTHESIA PREPROCEDURE EVALUATION
Anesthesia Evaluation     Patient summary reviewed and Nursing notes reviewed                Airway   Mallampati: I  TM distance: >3 FB  Neck ROM: full  No difficulty expected  Dental - normal exam     Pulmonary - normal exam   (+) pneumonia , pleural effusion, COPD, sleep apnea,   Cardiovascular - normal exam    (+) hypertension, past MI , CAD, CABG, dysrhythmias, CHF , hyperlipidemia,       Neuro/Psych  (+) TIA, CVA,     GI/Hepatic/Renal/Endo    (+)   renal disease, diabetes mellitus,     Musculoskeletal     Abdominal  - normal exam    Bowel sounds: normal.   Substance History - negative use     OB/GYN negative ob/gyn ROS         Other   arthritis,    history of cancer                    Anesthesia Plan    ASA 4     MAC       Anesthetic plan, all risks, benefits, and alternatives have been provided, discussed and informed consent has been obtained with: patient.

## 2020-06-02 ENCOUNTER — APPOINTMENT (OUTPATIENT)
Dept: NUCLEAR MEDICINE | Facility: HOSPITAL | Age: 65
End: 2020-06-02

## 2020-06-02 LAB
ALBUMIN SERPL-MCNC: 2.7 G/DL (ref 3.5–5.2)
ANION GAP SERPL CALCULATED.3IONS-SCNC: 4 MMOL/L (ref 5–15)
BASOPHILS # BLD AUTO: 0.01 10*3/MM3 (ref 0–0.2)
BASOPHILS NFR BLD AUTO: 0.1 % (ref 0–1.5)
BUN BLD-MCNC: 34 MG/DL (ref 8–23)
BUN/CREAT SERPL: 21.9 (ref 7–25)
CALCIUM SPEC-SCNC: 8.5 MG/DL (ref 8.6–10.5)
CHLORIDE SERPL-SCNC: 104 MMOL/L (ref 98–107)
CO2 SERPL-SCNC: 23 MMOL/L (ref 22–29)
CREAT BLD-MCNC: 1.55 MG/DL (ref 0.76–1.27)
CYTO UR: NORMAL
DEPRECATED RDW RBC AUTO: 44.8 FL (ref 37–54)
EOSINOPHIL # BLD AUTO: 0.14 10*3/MM3 (ref 0–0.4)
EOSINOPHIL NFR BLD AUTO: 1.3 % (ref 0.3–6.2)
ERYTHROCYTE [DISTWIDTH] IN BLOOD BY AUTOMATED COUNT: 14.5 % (ref 12.3–15.4)
GFR SERPL CREATININE-BSD FRML MDRD: 45 ML/MIN/1.73
GLUCOSE BLD-MCNC: 160 MG/DL (ref 65–99)
GLUCOSE BLDC GLUCOMTR-MCNC: 156 MG/DL (ref 70–130)
GLUCOSE BLDC GLUCOMTR-MCNC: 164 MG/DL (ref 70–130)
GLUCOSE BLDC GLUCOMTR-MCNC: 167 MG/DL (ref 70–130)
GLUCOSE BLDC GLUCOMTR-MCNC: 294 MG/DL (ref 70–130)
HCT VFR BLD AUTO: 24.9 % (ref 37.5–51)
HCT VFR BLD AUTO: 25.5 % (ref 37.5–51)
HGB BLD-MCNC: 8.3 G/DL (ref 13–17.7)
HGB BLD-MCNC: 8.4 G/DL (ref 13–17.7)
IMM GRANULOCYTES # BLD AUTO: 0.04 10*3/MM3 (ref 0–0.05)
IMM GRANULOCYTES NFR BLD AUTO: 0.4 % (ref 0–0.5)
INR PPP: 1.15 (ref 0.9–1.1)
LAB AP CASE REPORT: NORMAL
LAB AP DIAGNOSIS COMMENT: NORMAL
LYMPHOCYTES # BLD AUTO: 1.01 10*3/MM3 (ref 0.7–3.1)
LYMPHOCYTES NFR BLD AUTO: 9.5 % (ref 19.6–45.3)
MCH RBC QN AUTO: 28 PG (ref 26.6–33)
MCHC RBC AUTO-ENTMCNC: 33.3 G/DL (ref 31.5–35.7)
MCV RBC AUTO: 84.1 FL (ref 79–97)
MONOCYTES # BLD AUTO: 0.65 10*3/MM3 (ref 0.1–0.9)
MONOCYTES NFR BLD AUTO: 6.1 % (ref 5–12)
NEUTROPHILS # BLD AUTO: 8.73 10*3/MM3 (ref 1.7–7)
NEUTROPHILS NFR BLD AUTO: 82.6 % (ref 42.7–76)
NRBC BLD AUTO-RTO: 0 /100 WBC (ref 0–0.2)
PATH REPORT.FINAL DX SPEC: NORMAL
PATH REPORT.GROSS SPEC: NORMAL
PHOSPHATE SERPL-MCNC: 2.8 MG/DL (ref 2.5–4.5)
PLATELET # BLD AUTO: 168 10*3/MM3 (ref 140–450)
PMV BLD AUTO: 10.3 FL (ref 6–12)
POTASSIUM BLD-SCNC: 4.7 MMOL/L (ref 3.5–5.2)
PROTHROMBIN TIME: 14.4 SECONDS (ref 11.7–14.2)
RBC # BLD AUTO: 2.96 10*6/MM3 (ref 4.14–5.8)
SODIUM BLD-SCNC: 131 MMOL/L (ref 136–145)
WBC NRBC COR # BLD: 10.58 10*3/MM3 (ref 3.4–10.8)

## 2020-06-02 PROCEDURE — 85610 PROTHROMBIN TIME: CPT | Performed by: INTERNAL MEDICINE

## 2020-06-02 PROCEDURE — 63710000001 INSULIN LISPRO (HUMAN) PER 5 UNITS: Performed by: INTERNAL MEDICINE

## 2020-06-02 PROCEDURE — A9560 TC99M LABELED RBC: HCPCS | Performed by: INTERNAL MEDICINE

## 2020-06-02 PROCEDURE — 85014 HEMATOCRIT: CPT | Performed by: INTERNAL MEDICINE

## 2020-06-02 PROCEDURE — 0 TECHNETIUM LABELED RED BLOOD CELLS: Performed by: INTERNAL MEDICINE

## 2020-06-02 PROCEDURE — 85018 HEMOGLOBIN: CPT | Performed by: INTERNAL MEDICINE

## 2020-06-02 PROCEDURE — 82962 GLUCOSE BLOOD TEST: CPT

## 2020-06-02 PROCEDURE — 78278 ACUTE GI BLOOD LOSS IMAGING: CPT

## 2020-06-02 PROCEDURE — 80069 RENAL FUNCTION PANEL: CPT | Performed by: INTERNAL MEDICINE

## 2020-06-02 PROCEDURE — 99232 SBSQ HOSP IP/OBS MODERATE 35: CPT | Performed by: INTERNAL MEDICINE

## 2020-06-02 PROCEDURE — 99232 SBSQ HOSP IP/OBS MODERATE 35: CPT | Performed by: NURSE PRACTITIONER

## 2020-06-02 PROCEDURE — 97110 THERAPEUTIC EXERCISES: CPT | Performed by: PHYSICAL THERAPIST

## 2020-06-02 PROCEDURE — 63710000001 INSULIN GLARGINE PER 5 UNITS: Performed by: INTERNAL MEDICINE

## 2020-06-02 PROCEDURE — 85025 COMPLETE CBC W/AUTO DIFF WBC: CPT | Performed by: INTERNAL MEDICINE

## 2020-06-02 RX ORDER — MIDODRINE HYDROCHLORIDE 5 MG/1
10 TABLET ORAL
Status: DISCONTINUED | OUTPATIENT
Start: 2020-06-02 | End: 2020-06-04 | Stop reason: HOSPADM

## 2020-06-02 RX ORDER — MIDODRINE HYDROCHLORIDE 5 MG/1
5 TABLET ORAL
Status: DISCONTINUED | OUTPATIENT
Start: 2020-06-02 | End: 2020-06-02

## 2020-06-02 RX ORDER — HEPARIN SODIUM (PORCINE) LOCK FLUSH IV SOLN 100 UNIT/ML 100 UNIT/ML
0.3 SOLUTION INTRAVENOUS
Status: DISCONTINUED | OUTPATIENT
Start: 2020-06-02 | End: 2020-06-04 | Stop reason: HOSPADM

## 2020-06-02 RX ORDER — WARFARIN SODIUM 4 MG/1
8 TABLET ORAL
Status: DISCONTINUED | OUTPATIENT
Start: 2020-06-02 | End: 2020-06-02

## 2020-06-02 RX ORDER — LEVOTHYROXINE SODIUM 0.07 MG/1
75 TABLET ORAL
Status: DISCONTINUED | OUTPATIENT
Start: 2020-06-03 | End: 2020-06-04 | Stop reason: HOSPADM

## 2020-06-02 RX ADMIN — FERROUS SULFATE TAB 325 MG (65 MG ELEMENTAL FE) 325 MG: 325 (65 FE) TAB at 08:41

## 2020-06-02 RX ADMIN — MIDODRINE HYDROCHLORIDE 2.5 MG: 2.5 TABLET ORAL at 07:28

## 2020-06-02 RX ADMIN — MIDODRINE HYDROCHLORIDE 5 MG: 5 TABLET ORAL at 12:30

## 2020-06-02 RX ADMIN — METOPROLOL SUCCINATE 25 MG: 25 TABLET, EXTENDED RELEASE ORAL at 08:41

## 2020-06-02 RX ADMIN — FERROUS SULFATE TAB 325 MG (65 MG ELEMENTAL FE) 325 MG: 325 (65 FE) TAB at 12:31

## 2020-06-02 RX ADMIN — LIDOCAINE 1 PATCH: 50 PATCH TOPICAL at 08:41

## 2020-06-02 RX ADMIN — ATORVASTATIN CALCIUM 40 MG: 20 TABLET, FILM COATED ORAL at 21:10

## 2020-06-02 RX ADMIN — MIDODRINE HYDROCHLORIDE 10 MG: 5 TABLET ORAL at 17:30

## 2020-06-02 RX ADMIN — INSULIN LISPRO 2 UNITS: 100 INJECTION, SOLUTION INTRAVENOUS; SUBCUTANEOUS at 17:30

## 2020-06-02 RX ADMIN — Medication 1000 MCG: at 08:41

## 2020-06-02 RX ADMIN — SODIUM CHLORIDE 500 ML: 9 INJECTION, SOLUTION INTRAVENOUS at 12:31

## 2020-06-02 RX ADMIN — FOLIC ACID 1 MG: 1 TABLET ORAL at 08:41

## 2020-06-02 RX ADMIN — ASPIRIN 81 MG: 81 TABLET, COATED ORAL at 07:28

## 2020-06-02 RX ADMIN — LIDOCAINE: 50 OINTMENT TOPICAL at 21:09

## 2020-06-02 RX ADMIN — SODIUM CHLORIDE, PRESERVATIVE FREE 10 ML: 5 INJECTION INTRAVENOUS at 21:10

## 2020-06-02 RX ADMIN — BUPROPION HYDROCHLORIDE 150 MG: 150 TABLET, FILM COATED, EXTENDED RELEASE ORAL at 08:41

## 2020-06-02 RX ADMIN — LEVOTHYROXINE SODIUM 75 MCG: 75 TABLET ORAL at 07:28

## 2020-06-02 RX ADMIN — INSULIN LISPRO 2 UNITS: 100 INJECTION, SOLUTION INTRAVENOUS; SUBCUTANEOUS at 08:40

## 2020-06-02 RX ADMIN — INSULIN LISPRO 5 UNITS: 100 INJECTION, SOLUTION INTRAVENOUS; SUBCUTANEOUS at 17:31

## 2020-06-02 RX ADMIN — VITAMIN D, TAB 1000IU (100/BT) 1000 UNITS: 25 TAB at 08:41

## 2020-06-02 RX ADMIN — TECHNETIUM TC 99M-LABELED RED BLOOD CELLS 1 DOSE: KIT at 13:20

## 2020-06-02 RX ADMIN — INSULIN GLARGINE 15 UNITS: 100 INJECTION, SOLUTION SUBCUTANEOUS at 08:42

## 2020-06-02 RX ADMIN — TAMSULOSIN HYDROCHLORIDE 0.4 MG: 0.4 CAPSULE ORAL at 21:09

## 2020-06-02 RX ADMIN — FERROUS SULFATE TAB 325 MG (65 MG ELEMENTAL FE) 325 MG: 325 (65 FE) TAB at 17:30

## 2020-06-02 RX ADMIN — INSULIN LISPRO 4 UNITS: 100 INJECTION, SOLUTION INTRAVENOUS; SUBCUTANEOUS at 12:38

## 2020-06-02 NOTE — PLAN OF CARE
Denies pain or SOA but reports feeling tired. Pt reporting black stools. H/H noted. NM RBC tag scan completed. B/P drops when stands. Midodrine increased  and 500 ml NS bolus given. Telemetry SR w/ 1*AVB and BBB.  Problem: Patient Care Overview  Goal: Plan of Care Review  Outcome: Ongoing (interventions implemented as appropriate)  Goal: Individualization and Mutuality  Outcome: Ongoing (interventions implemented as appropriate)  Goal: Discharge Needs Assessment  Outcome: Ongoing (interventions implemented as appropriate)  Goal: Interprofessional Rounds/Family Conf  Outcome: Ongoing (interventions implemented as appropriate)     Problem: Patient Care Overview  Goal: Plan of Care Review  Outcome: Ongoing (interventions implemented as appropriate)  Goal: Individualization and Mutuality  Outcome: Ongoing (interventions implemented as appropriate)  Goal: Discharge Needs Assessment  Outcome: Ongoing (interventions implemented as appropriate)  Goal: Interprofessional Rounds/Family Conf  Outcome: Ongoing (interventions implemented as appropriate)     Problem: Fall Risk (Adult)  Goal: Absence of Fall  Outcome: Ongoing (interventions implemented as appropriate)     Problem: Renal Failure/Kidney Injury, Acute (Adult)  Goal: Signs and Symptoms of Listed Potential Problems Will be Absent, Minimized or Managed (Renal Failure/Kidney Injury, Acute)  Outcome: Ongoing (interventions implemented as appropriate)     Problem: Pain, Acute (Adult)  Goal: Acceptable Pain Control/Comfort Level  Outcome: Ongoing (interventions implemented as appropriate)

## 2020-06-02 NOTE — THERAPY TREATMENT NOTE
Patient Name: Carlito Mo  : 1955    MRN: [id number]                              Today's Date: 2020       Admit Date: 2020    Visit Dx:     ICD-10-CM ICD-9-CM   1. Acute on chronic renal insufficiency N28.9 593.9    N18.9 585.9   2. Generalized weakness R53.1 780.79   3. Elevated troponin R79.89 790.6   4. Contusion of right shoulder, initial encounter S40.011A 923.00   5. Upper back strain, initial encounter S29.012A 847.1   6. Lightheadedness R42 780.4   7. Acute on chronic anemia D64.9 285.9   8. Heme positive stool R19.5 792.1   9. Anemia D64.9 285.9   10. History of colon polyps Z86.010 V12.72     Patient Active Problem List   Diagnosis   • Major depressive disorder with single episode, in partial remission (CMS/[facility])   • Other hyperlipidemia   • Chronic ischemic heart disease   • Vitamin D deficiency   • Erectile dysfunction   • Chronic fatigue   • Type 2 diabetes mellitus with ophthalmic complication (CMS/[facility])   • Skin lesion of chest wall   • Slow transit constipation   • Benign prostatic hyperplasia with nocturia   • CKD (chronic kidney disease) stage 3, GFR 30-59 ml/min (CMS/[facility])   • History of basal cell carcinoma   • High blood pressure associated with diabetes (CMS/[facility])   • Acquired hypothyroidism   • Recurrent strokes (CMS/[facility])   • Urinary retention   • SYLVAIN (acute kidney injury) (CMS/[facility])   • Pneumonia   • Chronic combined systolic and diastolic congestive heart failure (CMS/HCC)   • Acute respiratory failure with hypoxia (CMS/[facility])   • Suspected sleep apnea   • Pleural effusion   • Chronic combined systolic and diastolic congestive heart failure (CMS/HCC)   • YAW on CPAP   • Coronary artery disease involving native coronary artery of native heart without angina pectoris   • Coronary artery disease   • Acute on chronic congestive heart failure (CMS/HCC)   • Elevated troponin   • Colon cancer screening   • Enlarged lymph nodes   • Functional gait abnormality   • History of myocardial  infarction   • Hospital discharge follow-up   • Insomnia   • Iron deficiency anemia   • Major depression, recurrent (CMS/HCC)   • Coronary artery disease involving native coronary artery of native heart without angina pectoris   • Anemia, chronic renal failure, stage 3 (moderate) (CMS/HCC)   • Essential hypertension   • Adverse effect of iron and its compounds, initial encounter   • Acute on chronic renal insufficiency   • Generalized weakness   • Falls frequently   • Type 2 diabetes mellitus with hyperglycemia (CMS/HCC)   • Acute pain of right shoulder   • Lower abdominal pain   • Acute on chronic anemia   • Heme positive stool   • Neurogenic orthostatic hypotension (CMS/HCC)   • Anemia   • History of colon polyps     Past Medical History:   Diagnosis Date   • Acquired hypothyroidism    • Acute congestive heart failure (CMS/HCC)    • Acute respiratory failure with hypoxia (CMS/HCC)    • Acute sinusitis    • SYLVAIN (acute kidney injury) (CMS/HCC)     on CKD   • Anemia    • Arthritis    • CAD (coronary artery disease)    • Cancer (CMS/HCC)     skin   • Chronic combined systolic and diastolic congestive heart failure (CMS/HCC)    • Chronic ischemic heart disease    • CKD (chronic kidney disease)    • COPD (chronic obstructive pulmonary disease) (CMS/HCC)    • Depression    • Diabetes mellitus type 2, uncontrolled, without complications (CMS/HCC)    • Disease of thyroid gland    • Fatigue    • Frequent PVCs    • Heart attack (CMS/HCC)    • History of coronary artery disease     with remote history of bypass surgery in 2001   • History of transfusion    • Hyperglycemia    • Hyperlipidemia    • Hypertension    • Hypertensive encephalopathy    • Mental status change     Acute   • YAW on CPAP    • Pleural effusion    • Pneumonia    • RBBB (right bundle branch block)    • Screening for prostate cancer 2011   • Stroke (CMS/HCC)    • TIA (transient ischemic attack)    • Type 2 diabetes mellitus (CMS/HCC)    • Urinary retention     • Vitamin D deficiency      Past Surgical History:   Procedure Laterality Date   • APPENDECTOMY N/A 02/14/2016    Dr. Alexey Dodson   • COLONOSCOPY      over 20 years ago.  no polyps    • COLONOSCOPY N/A 9/18/2018    Procedure: COLONOSCOPY;  Surgeon: Jose Enrique Louie MD;  Location: [facility] ENDOSCOPY;  Service: Gastroenterology   • COLONOSCOPY N/A 9/19/2018    IH, EH, polyps (TAs w/low grade dysplasia)   • COLONOSCOPY N/A 6/1/2020    Procedure: COLONOSCOPY;  Surgeon: Jose Enrique Louie MD;  Location: [facility] ENDOSCOPY;  Service: Gastroenterology;  Laterality: N/A;  Pre op-Anemia, Melena, History of Polyps  Post op-To Cecum/TI, Polyp, Poor Prep   • CORONARY ARTERY BYPASS GRAFT  11/2001   • ENDOSCOPY N/A 5/29/2020    Procedure: ESOPHAGOGASTRODUODENOSCOPY with biopsies;  Surgeon: Amanda Lovelace MD;  Location: [facility] ENDOSCOPY;  Service: Gastroenterology;  Laterality: N/A;  pre-anemia, dark stools  post-esophagitis, hiatal hernia   • HERNIA REPAIR Left 12/05/2019   • TONSILLECTOMY     • VASECTOMY       General Information     Row Name 06/02/20 1159          PT Evaluation Time/Intention    Document Type  therapy note (daily note)  -     Mode of Treatment  physical therapy  -       User Key  (r) = Recorded By, (t) = Taken By, (c) = Cosigned By    Initials Name Provider Type    Jody Nelson, PT Physical Therapist        Mobility     Row Name 06/02/20 1159          Bed Mobility Assessment/Treatment    Bed Mobility Assessment/Treatment  supine-sit;sit-supine  -     Supine-Sit [place] (Bed Mobility)  supervision  -     Sit-Supine [place] (Bed Mobility)  supervision;verbal cues  -     Row Name 06/02/20 1159          Sit-Stand Transfer    Sit-Stand [place] (Transfers)  contact guard  -     Assistive Device (Sit-Stand Transfers)  walker, front-wheeled  -     Row Name 06/02/20 1159          Gait/Stairs Assessment/Training    Gait/Stairs Assessment/Training  gait/ambulation  independence  -     [place] Level (Gait)  contact guard  -     Assistive Device (Gait)  cane, straight  -     Distance in Feet (Gait)  20 ft, 60 ft with seated rest between  -     Deviations/Abnormal Patterns (Gait)  gait speed decreased  -     Bilateral Gait Deviations  forward flexed posture  -       User Key  (r) = Recorded By, (t) = Taken By, (c) = Cosigned By    Initials Name Provider Type    Jody Nelson PT Physical Therapist        Obj/Interventions     [facility] Name 06/02/20 1200          Therapeutic Exercise    Comment (Therapeutic Exercise)  BLE AP, LAQ, seated marching x 10 reps  -       User Key  (r) = Recorded By, (t) = Taken By, (c) = Cosigned By    Initials Name Provider Type    Jody Nelson PT Physical Therapist        Goals/Plan    No documentation.       Clinical Impression     Row Name 06/02/20 1200          Pain Assessment    Additional Documentation  Pain Scale: Numbers Pre/Post-Treatment (Group)  -KH     Row Name 06/02/20 1200          Pain Scale: Numbers Pre/Post-Treatment    Pain Scale: Numbers, Pretreatment  0/10 - no pain  -     Pain Scale: Numbers, Post-Treatment  0/10 - no pain  -KH     Row Name 06/02/20 1200          Plan of Care Review    Plan of Care Reviewed With  patient  -     Outcome Summary  Pt was agreeable to therapy. He tolerated increased ambulation distance despite c/o dizziness. he ambualted 20 ft on first walk then 60 ft after a seated rest break.   -[facility] Name 06/02/20 1200          Positioning and Restraints    Pre-Treatment Position  in bed  -     Post Treatment Position  chair  -     In Chair  reclined;call light within reach;encouraged to call for assist;exit alarm on;notified nsg  -       User Key  (r) = Recorded By, (t) = Taken By, (c) = Cosigned By    Initials Name Provider Type    Jody Nelson PT Physical Therapist        Outcome Measures     [facility] Name 06/02/20 1202          How much help from  another person do you currently need...    Turning from your back to your side while in flat bed without using bedrails?  4  -KH     Moving from lying on back to sitting on the side of a flat bed without bedrails?  4  -KH     Moving to and from a bed to a chair (including a wheelchair)?  3  -KH     Standing up from a chair using your arms (e.g., wheelchair, bedside chair)?  3  -KH     Climbing 3-5 steps with a railing?  3  -KH     To walk in hospital room?  3  -KH     AM-PAC 6 Clicks Score (PT)  20  -KH     Row Name 06/02/20 1202          Functional Assessment    Outcome Measure Options  AM-PAC 6 Clicks Basic Mobility (PT)  -       User Key  (r) = Recorded By, (t) = Taken By, (c) = Cosigned By    Initials Name Provider Type    Jody Nelson, PT Physical Therapist        Physical Therapy Education                 Title: PT OT SLP Therapies (Done)     Topic: Physical Therapy (Done)     Point: Mobility training (Done)     Description:   Instruct learner(s) on safety and technique for assisting patient out of bed, chair or wheelchair.  Instruct in the proper use of assistive devices, such as walker, crutches, cane or brace.              Patient Friendly Description:   It's important to get you on your feet again, but we need to do so in a way that is safe for you. Falling has serious consequences, and your personal safety is the most important thing of all.        When it's time to get out of bed, one of us or a family member will sit next to you on the bed to give you support.     If your doctor or nurse tells you to use a walker, crutches, a cane, or a brace, be sure you use it every time you get out of bed, even if you think you don't need it.    Learning Progress Summary           Patient Acceptance, E,TB, VU by TONY at 6/2/2020 1202    Acceptance, E, VU by EM at 5/30/2020 1351    Acceptance, E, VU,NR by MS at 5/28/2020 1157    Acceptance, E, VU,NR by MS at 5/26/2020 1524                   Point: Home  exercise program (Done)     Description:   Instruct learner(s) on appropriate technique for monitoring, assisting and/or progressing patient with therapeutic exercises and activities.              Learning Progress Summary           Patient Acceptance, E,TB, VU by  at 6/2/2020 1202                   Point: Body mechanics (Done)     Description:   Instruct learner(s) on proper positioning and spine alignment for patient and/or caregiver during mobility tasks and/or exercises.              Learning Progress Summary           Patient Acceptance, E,TB, VU by  at 6/2/2020 1202    Acceptance, E, VU,NR by MS at 5/28/2020 1157    Acceptance, E, VU,NR by MS at 5/26/2020 1524                   Point: Precautions (Done)     Description:   Instruct learner(s) on prescribed precautions during mobility and gait tasks              Learning Progress Summary           Patient Acceptance, E,TB, VU by  at 6/2/2020 1202    Acceptance, E, VU,NR by MS at 5/28/2020 1157    Acceptance, E, VU,NR by MS at 5/26/2020 1524                               User Key     Initials Effective Dates Name Provider Type Discipline     02/05/19 -  Jody Burns, PT Physical Therapist PT    EM 04/03/18 -  Mary Jo Hernandez, PT Physical Therapist PT    MS 03/04/19 -  Reina Jean-Baptiste, PT Physical Therapist PT              PT Recommendation and Plan     Plan of Care Reviewed With: patient  Outcome Summary: Pt was agreeable to therapy. He tolerated increased ambulation distance despite c/o dizziness. he ambualted 20 ft on first walk then 60 ft after a seated rest break.      Time Calculation:   PT Charges     Row Name 06/02/20 1159             Time Calculation    Start Time  0935  -      Stop Time  0952  -      Time Calculation (min)  17 min  -      PT Received On  06/02/20  -      PT - Next Appointment  06/03/20  -         Time Calculation- PT    Total Timed Code Minutes- PT  17 minute(s)  -        User Key  (r) = Recorded  By, (t) = Taken By, (c) = Cosigned By    Initials Name Provider Type     Jody Burns, PT Physical Therapist        Therapy Charges for Today     Code Description Service Date Service Provider Modifiers Qty    86757971239 HC PT THER PROC EA 15 MIN 6/2/2020 Jody Burns, PT GP 1          PT G-Codes  Outcome Measure Options: AM-PAC 6 Clicks Basic Mobility (PT)  AM-PAC 6 Clicks Score (PT): 20    Jody Burns, PT  6/2/2020

## 2020-06-02 NOTE — PROGRESS NOTES
Name: Carlito Mo ADMIT: 2020   : 1955  PCP: Florencia Caballero MD    MRN: [id number] LOS: 7 days   AGE/SEX: 65 y.o. male  ROOM: [facility]     Subjective   Subjective   CC: generalized weakness, light-headedness on standing  No acute events. Patient feels a little weaker and more light-headed today. Taking PO. No CP/dyspnea/f/c/n/v/d.      Objective   Objective   Vital Signs  Temp:  [97.9 °F (36.6 °C)-98.4 °F (36.9 °C)] 98.3 °F (36.8 °C)  Heart Rate:  [64-96] 96  Resp:  [16-18] 18  BP: ()/(49-79) 101/77  SpO2:  [94 %-97 %] 95 %  on   ;   Device (Oxygen Therapy): room air  Body mass index is 24.11 kg/m².  Physical Exam   Constitutional: He is oriented to person, place, and time. He has a sickly appearance. No distress.   HENT:   Head: Normocephalic and atraumatic.   Mouth/Throat: Oropharynx is clear and moist.   Eyes: Pupils are equal, round, and reactive to light. Conjunctivae and EOM are normal.   Neck: Neck supple. No JVD present.   Cardiovascular: Normal rate, normal heart sounds and intact distal pulses.   Pulmonary/Chest: Effort normal and breath sounds normal. No respiratory distress.   Abdominal: Soft. Bowel sounds are normal. He exhibits no distension. There is no tenderness.   Musculoskeletal: He exhibits no edema or deformity.   Neurological: He is alert and oriented to person, place, and time.   Skin: Skin is warm and dry. He is not diaphoretic.   Psychiatric: He has a normal mood and affect. His behavior is normal.   Nursing note and vitals reviewed.     Results Review:       I reviewed the patient's new clinical results.  I reviewed the patient's telemetry.  Results from last 7 days   Lab Units 20  1239 20  0640 20  0643 20  0704 20  0653   WBC 10*3/mm3  --  10.58 7.72 7.02 6.90   HEMOGLOBIN g/dL 8.4* 8.3* 10.1* 9.7* 9.9*   PLATELETS 10*3/mm3  --  168 180 168 185     Results from last 7 days   Lab Units 20  0640 20  0625 20  0632  05/30/20 0643   SODIUM mmol/L 131* 137 136 140   POTASSIUM mmol/L 4.7 5.2 5.1 4.9   CHLORIDE mmol/L 104 106 108* 107   CO2 mmol/L 23.0 24.0 23.0 25.3   BUN mg/dL 34* 39* 41* 33*   CREATININE mg/dL 1.55* 1.64* 1.78* 1.61*   GLUCOSE mg/dL 160* 114* 109* 232*   Estimated Creatinine Clearance: 52.7 mL/min (A) (by C-G formula based on SCr of 1.55 mg/dL (H)).  Results from last 7 days   Lab Units 06/02/20 0640 05/31/20 0632 05/30/20 0643 05/29/20 0704 05/27/20  0608   ALBUMIN g/dL 2.70* 2.80* 3.00* 2.70*   < > 3.10*   BILIRUBIN mg/dL  --   --   --   --   --  0.2   ALK PHOS U/L  --   --   --   --   --  56   AST (SGOT) U/L  --   --   --   --   --  19   ALT (SGPT) U/L  --   --   --   --   --  41    < > = values in this interval not displayed.     Results from last 7 days   Lab Units 06/02/20 0640 06/01/20 0625 05/31/20 0632 05/30/20 0643 05/29/20  0704 05/27/20  0608   CALCIUM mg/dL 8.5* 8.5* 8.2* 8.6 8.3*   < > 8.4*   ALBUMIN g/dL 2.70*  --  2.80* 3.00* 2.70*   < > 3.10*   MAGNESIUM mg/dL  --   --  2.0  --   --   --  2.1   PHOSPHORUS mg/dL 2.8  --  3.1 3.1 3.4   < > 3.0    < > = values in this interval not displayed.         Glucose   Date/Time Value Ref Range Status   06/02/2020 1141 294 (H) 70 - 130 mg/dL Final   06/02/2020 0556 167 (H) 70 - 130 mg/dL Final   06/01/2020 2101 278 (H) 70 - 130 mg/dL Final   06/01/2020 1651 209 (H) 70 - 130 mg/dL Final   06/01/2020 1202 95 70 - 130 mg/dL Final   06/01/2020 0555 89 70 - 130 mg/dL Final   05/31/2020 2024 175 (H) 70 - 130 mg/dL Final       XR Abdomen KUB  ONE VIEW ABDOMEN     HISTORY: Abdominal pain.     There is a moderate amount of bowel gas in the stomach and to a lesser  extent bowel gas scattered in the colon. There is no evidence of  small-bowel obstruction or significant ileus.     This report was finalized on 5/27/2020 2:47 PM by Dr. Nigel Pearson M.D.     Adult Transthoracic Echo Complete W/ Cont if Necessary Per Protocol  · Left ventricular systolic  function is normal. Calculated EF = 60%. There   is hypokinesis of the distal anteroseptal, distal inferoseptal and the   apex. Diastolic function is indeterminate.  · There is mild concentric hypertrophy of the left ventricle.  · Normal right ventricular cavity size, wall thickness, systolic function  · Left atrial cavity size is mildly dilated.  · No significant valvular abnormality noted.  · Compared to prior study on 12/2/2018 left ventricular ejection fraction   has improved.         aspirin 81 mg Oral QAM   atorvastatin 40 mg Oral Nightly   buPROPion  mg Oral Daily   cholecalciferol 1,000 Units Oral Daily   ferrous sulfate 325 mg Oral TID With Meals   folic acid 1 mg Oral Daily   heparin 0.3 mL Other Once in imaging   insulin glargine 15 Units Subcutaneous QAM   insulin lispro 0-7 Units Subcutaneous TID AC   insulin lispro 5 Units Subcutaneous TID With Meals   [START ON 6/3/2020] levothyroxine 75 mcg Oral Q AM   lidocaine 1 patch Transdermal Q24H   metoprolol succinate XL 25 mg Oral Q24H   midodrine 10 mg Oral TID AC   tamsulosin 0.4 mg Oral Nightly   vitamin B-12 1,000 mcg Oral Daily       Pharmacy to dose warfarin    Diet Regular; Consistent Carbohydrate       Assessment/Plan     Active Hospital Problems    Diagnosis  POA   • **SYLVAIN (acute kidney injury) (CMS/HCC) [N17.9]  Yes   • Neurogenic orthostatic hypotension (CMS/HCC) [G90.3]  Yes   • Acute pain of right shoulder [M25.511]  Yes   • Lower abdominal pain [R10.30]  Yes   • Acute on chronic renal insufficiency [N28.9, N18.9]  Yes   • Generalized weakness [R53.1]  Yes   • Falls frequently [R29.6]  Not Applicable   • Type 2 diabetes mellitus with hyperglycemia (CMS/HCC) [E11.65]  Yes   • Acute on chronic anemia [D64.9]  Yes   • Heme positive stool [R19.5]  Yes   • Anemia [D64.9]  Unknown   • History of colon polyps [Z86.010]  Unknown   • Essential hypertension [I10]  Yes   • Anemia, chronic renal failure, stage 3 (moderate) (CMS/HCC) [N18.3, D63.1]   Yes   • Elevated troponin [R79.89]  Yes   • YAW on CPAP [G47.33, Z99.89]  Not Applicable   • Chronic combined systolic and diastolic congestive heart failure (CMS/HCC) [I50.42]  Yes   • Recurrent strokes (CMS/HCC) [I63.9]  Yes   • Acquired hypothyroidism [E03.9]  Yes   • CKD (chronic kidney disease) stage 3, GFR 30-59 ml/min (CMS/HCC) [N18.3]  Yes   • Chronic ischemic heart disease [I25.9]  Yes      Resolved Hospital Problems   No resolved problems to display.   Symptomatic Anemia  - likely source of weakness and falls and likely a factor in his orthostasis  - FOBT is positive-EGD 5/29 with esophagitis and gastritis (biopsied) as well as small hiatal hernia, continue on BID PPI-will need follow up EGD in 3 months to assess healing  - colonoscopy 6/1/20 with stool in the colon and an ascending colon polyp which was removed  - continue Fe replacement  - hgb is lower today and I have ordered a repeat-he will also have a tagged RBC scan  - appreciate GI recs    SYLVAIN on CKD Stage 3  - pre-renal from hypotension, hypovolemia in the setting of lisinopril and lasix which are held  - creatinine 2.59 on admission, improving-baseline creatinine is 1.8  - appreciate nephrology recs    Orthostatic Hypotension  - persistent despite volume correction and holding amlodipine  - likely due to autonomic neuropathy from diabetes mellitus  - midodrine increased and compression stockings given-will follow orthostatic blood pressures    Ischemic Heart Disease with Chronic Combined Systolic and Diastolic CHF  - no anginal symptoms, continue ASA, statin; troponin is chronically elevated  - off amlodipine due to orthostatic hypotension  - continue on metoprolol  - monitor volume status in the hospital  - appreciate cardiology recs    Type 2 DM  - complications include nephropathy, retinopathy, and neuropathy  - continue 15 units lantus QAM   - start humalog 5 units TID with meals  - cover with ssi/hypoglycemia protocol    SCDs for DVT  prophylaxis.  Full code.  Discussed with patient and nursing staff.  Disposition to be determined.    Eliseo Gross MD  [facility]  06/02/20  14:31

## 2020-06-02 NOTE — PROGRESS NOTES
"    Patient Name: Carlito Mo  :1955  65 y.o.      Patient Care Team:  Florencia Caballero MD as PCP - General (Internal Medicine)  Amber Murguia MD as Consulting Physician (Cardiology)  [facility] as Pharmacist  Seth Ladd MD as Surgeon (General Surgery)  Kim Hoyos MD PhD as Consulting Physician (Hematology and Oncology)  Jerome Diallo MD as Referring Physician (Family Medicine)    Chief Complaint: follow up chest pain, orthostasis    Interval History: walked into the [facility] today. Still symptomatic with light headed and dizziness.         Objective   Vital Signs  Temp:  [97.7 °F (36.5 °C)-98.4 °F (36.9 °C)] 98.3 °F (36.8 °C)  Heart Rate:  [57-96] 96  Resp:  [16-18] 18  BP: ()/(49-84) 101/77    Intake/Output Summary (Last 24 hours) at 2020 1125  Last data filed at 2020 0937  Gross per 24 hour   Intake 360 ml   Output 1200 ml   Net -840 ml     Flowsheet Rows      First Filed Value   Admission Height  180.3 cm (71\") Documented at 2020 1547   Admission Weight  74.8 kg (165 lb) Documented at 2020 1547          Physical Exam:   General Appearance:    Alert, cooperative, in no acute distress   Lungs:     Clear to auscultation.  Normal respiratory effort and rate.      Heart:    Regular rhythm and normal rate, normal S1 and S2, no murmurs, gallops or rubs.     Chest Wall:    No abnormalities observed   Abdomen:     Soft, nontender, positive bowel sounds.     Extremities:   no cyanosis, clubbing or edema.  No marked joint deformities.  Adequate musculoskeletal strength.       Results Review:    Results from last 7 days   Lab Units 20  0640   SODIUM mmol/L 131*   POTASSIUM mmol/L 4.7   CHLORIDE mmol/L 104   CO2 mmol/L 23.0   BUN mg/dL 34*   CREATININE mg/dL 1.55*   GLUCOSE mg/dL 160*   CALCIUM mg/dL 8.5*         Results from last 7 days   Lab Units 20  0640   WBC 10*3/mm3 10.58   HEMOGLOBIN g/dL 8.3*   HEMATOCRIT % 24.9*   PLATELETS 10*3/mm3 168 "     Results from last 7 days   Lab Units 06/02/20  0640 06/01/20  0625 05/31/20  0632   INR  1.15* 1.11* 1.17*     Results from last 7 days   Lab Units 05/31/20  0632   MAGNESIUM mg/dL 2.0                   Medication Review:     aspirin 81 mg Oral QAM   atorvastatin 40 mg Oral Nightly   buPROPion  mg Oral Daily   cholecalciferol 1,000 Units Oral Daily   ferrous sulfate 325 mg Oral TID With Meals   folic acid 1 mg Oral Daily   insulin glargine 15 Units Subcutaneous QAM   insulin lispro 0-7 Units Subcutaneous TID AC   [START ON 6/3/2020] levothyroxine 75 mcg Oral Q AM   lidocaine 1 patch Transdermal Q24H   metoprolol succinate XL 25 mg Oral Q24H   midodrine 5 mg Oral TID AC   sodium chloride 500 mL Intravenous Once   tamsulosin 0.4 mg Oral Nightly   vitamin B-12 1,000 mcg Oral Daily          Pharmacy to dose warfarin        Assessment/Plan   1. Elevated troponin - this is chronic. EKG nonischemic. Stress one year ago with apical infarct, no ischemia, echocardiogram this admission with wall motion abnormality consistent with this.     Given comorbidities, favor medical therapy. We had to stop amlodipine. Nitrate would worsen ortho stasis. Symptoms are pretty stable at this point. Continue beta blocker.      2. SYLVAIN on chronic kidney disease - nephrology is following. Near baseline.      3. Falls/weakness/fatigue/weight loss - will symptomatic from orthostasis.     4. Diabetes mellitus type II - autonomic component to ortho stasis.     5. History of stroke , small PFO. On warfarin which is on hold. Resume today.      6. Obstructive sleep apnea - cpap     7. Chronic combined systolic and diastolic heart failure - compensated without diuretic.  Monitor. He follows with [facility].     8. History of coronary artery disease - remote bypass 2001. On aspirin/statin.      9. Hypertension with orthostatic hypotension - He is normotensive when supine and still seems orthostatic. Midodrine was added.  Remains orthostatic today, s/p IVF yesterday.     10. Anemia, positive heme occult stool - EGD with esophagitis. Colonoscopy with one polyp which was resected. No source of bleeding. Outpatient capsule study recommended.     Resume warfarin when ok with GI. Hgb dropped today and tagged red blood cell scan has been ordered. Increase midodrine.     JI Mcmullen  [facility]  06/02/20  11:25

## 2020-06-02 NOTE — PROGRESS NOTES
"   LOS: 7 days    Patient Care Team:  Florencia Caballero MD as PCP - General (Internal Medicine)  Amber Murguia MD as Consulting Physician (Cardiology)  [facility] as Pharmacist  Seth Ladd MD as Surgeon (General Surgery)  Kim Hoyos MD PhD as Consulting Physician (Hematology and Oncology)  Jerome Diallo MD as Referring Physician (Family Medicine)    Chief Complaint:    Chief Complaint   Patient presents with   • Fall   • Weakness - Generalized     Follow UP SYLVAIN CKD3  Subjective     Interval History: colonoscopy yest.  C/o fatigue; no dyspnea.      Objective     Vital Signs  Temp:  [97.7 °F (36.5 °C)-98.4 °F (36.9 °C)] 98.3 °F (36.8 °C)  Heart Rate:  [55-91] 67  Resp:  [12-18] 16  BP: ()/(50-84) 115/71    Flowsheet Rows      First Filed Value   Admission Height  180.3 cm (71\") Documented at 05/25/2020 1547   Admission Weight  74.8 kg (165 lb) Documented at 05/25/2020 1547          No intake/output data recorded.  I/O last 3 completed shifts:  In: 120 [P.O.:120]  Out: 2095 [Urine:2095]    Intake/Output Summary (Last 24 hours) at 6/2/2020 0736  Last data filed at 6/2/2020 0557  Gross per 24 hour   Intake 120 ml   Output 1200 ml   Net -1080 ml       Physical Exam:  GEN frail WM I nno acute distress  Neck supple no JVD  Lungs CTA bilat no rales  CV RRR no m/ga  abd soft NT/ND  vasc no pedal/ankle edema, +comp stcokings     Results Review:    Results from last 7 days   Lab Units 06/01/20  0625 05/31/20  0632 05/30/20  0643  05/27/20  0608   SODIUM mmol/L 137 136 140   < > 141   POTASSIUM mmol/L 5.2 5.1 4.9   < > 4.0   CHLORIDE mmol/L 106 108* 107   < > 109*   CO2 mmol/L 24.0 23.0 25.3   < > 24.7   BUN mg/dL 39* 41* 33*   < > 35*   CREATININE mg/dL 1.64* 1.78* 1.61*   < > 1.57*   CALCIUM mg/dL 8.5* 8.2* 8.6   < > 8.4*   BILIRUBIN mg/dL  --   --   --   --  0.2   ALK PHOS U/L  --   --   --   --  56   ALT (SGPT) U/L  --   --   --   --  41   AST (SGOT) U/L  --   --   --   --  19   GLUCOSE " mg/dL 114* 109* 232*   < > 60*    < > = values in this interval not displayed.       Estimated Creatinine Clearance: 49.8 mL/min (A) (by C-G formula based on SCr of 1.64 mg/dL (H)).    Results from last 7 days   Lab Units 05/31/20  0632 05/30/20  0643 05/29/20  0704  05/27/20  0608   MAGNESIUM mg/dL 2.0  --   --   --  2.1   PHOSPHORUS mg/dL 3.1 3.1 3.4   < > 3.0    < > = values in this interval not displayed.             Results from last 7 days   Lab Units 06/02/20  0640 05/30/20  0643 05/29/20  0704 05/28/20  0653 05/27/20  0608   WBC 10*3/mm3 10.58 7.72 7.02 6.90 5.93   HEMOGLOBIN g/dL 8.3* 10.1* 9.7* 9.9* 10.5*   PLATELETS 10*3/mm3 168 180 168 185 205       Results from last 7 days   Lab Units 06/02/20  0640 06/01/20  0625 05/31/20  0632 05/30/20  0643 05/29/20  0704   INR  1.15* 1.11* 1.17* 1.24* 1.50*         Imaging Results (Last 24 Hours)     ** No results found for the last 24 hours. **          aspirin 81 mg Oral QAM   atorvastatin 40 mg Oral Nightly   buPROPion  mg Oral Daily   cholecalciferol 1,000 Units Oral Daily   ferrous sulfate 325 mg Oral TID With Meals   folic acid 1 mg Oral Daily   insulin glargine 15 Units Subcutaneous QAM   insulin lispro 0-7 Units Subcutaneous TID AC   levothyroxine 75 mcg Oral Daily   lidocaine 1 patch Transdermal Q24H   metoprolol succinate XL 25 mg Oral Q24H   midodrine 2.5 mg Oral TID AC   tamsulosin 0.4 mg Oral Nightly   vitamin B-12 1,000 mcg Oral Daily       Pharmacy to dose warfarin        Medication Review:   Current Facility-Administered Medications   Medication Dose Route Frequency Provider Last Rate Last Dose   • aspirin EC tablet 81 mg  81 mg Oral QAM Jose Enrique Louie MD   81 mg at 06/02/20 0728   • atorvastatin (LIPITOR) tablet 40 mg  40 mg Oral Nightly Jose Enrique Louie MD   40 mg at 06/01/20 2118   • buPROPion XL (WELLBUTRIN XL) 24 hr tablet 150 mg  150 mg Oral Daily Jose Enrique Louie MD   150 mg at 05/31/20 0903   • cholecalciferol (VITAMIN  D3) tablet 1,000 Units  1,000 Units Oral Daily Jose Enrique Louie MD   1,000 Units at 05/31/20 0903   • dextrose (D50W) 25 g/ 50mL Intravenous Solution 25 g  25 g Intravenous Q15 Min PRN Jose Enrique Louie MD   25 g at 05/29/20 1723   • dextrose (GLUTOSE) oral gel 15 g  15 g Oral Q15 Min PRN Jose Enrique Louie MD       • ferrous sulfate tablet 325 mg  325 mg Oral TID With Meals Jose Enrique Louie MD   325 mg at 06/01/20 1724   • folic acid (FOLVITE) tablet 1 mg  1 mg Oral Daily Jose Enrique Louie MD   1 mg at 05/31/20 0904   • glucagon (human recombinant) (GLUCAGEN DIAGNOSTIC) injection 1 mg  1 mg Subcutaneous Q15 Min PRN Jose Enrique Louie MD   1 mg at 05/29/20 1153   • insulin glargine (LANTUS) injection 15 Units  15 Units Subcutaneous QAM Jose Enrique Louie MD   Stopped at 06/01/20 0805   • insulin lispro (humaLOG) injection 0-7 Units  0-7 Units Subcutaneous TID AC Jose Enrique Louie MD   3 Units at 06/01/20 1725   • levothyroxine (SYNTHROID, LEVOTHROID) tablet 75 mcg  75 mcg Oral Daily Jose Enrique Louie MD   75 mcg at 06/02/20 0728   • lidocaine (LIDODERM) 5 % 1 patch  1 patch Transdermal Q24H Jose Enrique Louie MD   1 patch at 06/01/20 0829   • lidocaine (XYLOCAINE) 5 % ointment   Topical PRN Jose Enrique Louie MD       • metoprolol succinate XL (TOPROL-XL) 24 hr tablet 25 mg  25 mg Oral Q24H Jose Enrique Louie MD   25 mg at 06/01/20 0829   • midodrine (PROAMATINE) tablet 2.5 mg  2.5 mg Oral TID AC Jose Enrique Louie MD   2.5 mg at 06/02/20 0728   • nitroglycerin (NITROSTAT) SL tablet 0.4 mg  0.4 mg Sublingual Q5 Min PRN Jose Enrique Louie MD       • ondansetron (ZOFRAN) injection 4 mg  4 mg Intravenous Q6H PRN Jose Enrique Louie MD       • Pharmacy to dose warfarin   Does not apply Continuous PRN Jose Enrique Louie MD       • sodium chloride 0.9 % flush 10 mL  10 mL Intravenous PRN Jose Enrique Louie MD   10 mL at 05/29/20 0922   • tamsulosin (FLOMAX) 24 hr  capsule 0.4 mg  0.4 mg Oral Nightly Jose Enrique Louie MD   0.4 mg at 06/01/20 2118   • vitamin B-12 (CYANOCOBALAMIN) tablet 1,000 mcg  1,000 mcg Oral Daily Jose Enrique Louie MD   1,000 mcg at 05/31/20 0905       Assessment/Plan   1.  SYLVAIN on CKD3: nonoliguric, likely due to hypotension and hypovolemia. improved, Cr stable 1.6 (yest, labs pending today).  Followed by Dr. Bryan Huddleston in our office.  ACE inhibitor is on hold.  K been low 5's so changed boost to renvela supplement.    2.  Progressive weakness and recent falls; has lost 30 pounds over the last 6 months  3.  Anemia: last iron stores look good on 4/7/2020.  Heme-positive stools, peptic ulcer disease on EGD; cscope with polyp, resected; hgb down 8.3  4.  PVD with prior strokes on AC  5.  DM2 with early DR; poor control by A1c  6.  HTN with orthostatic hypotension.   Compression stockings in place; on midodrine 2.5 TID; low dose toprol; s/p IVF yest  7.  Ischemic cardiomyopathy - compensated w/o diuretic    Plan  - follow up labs & repeat orthostatics this AM; compression stockings placed       SYLVAIN (acute kidney injury) (CMS/HCC)    Chronic ischemic heart disease    CKD (chronic kidney disease) stage 3, GFR 30-59 ml/min (CMS/HCC)    Acquired hypothyroidism    Recurrent strokes (CMS/HCC)    Chronic combined systolic and diastolic congestive heart failure (CMS/HCC)    YAW on CPAP    Elevated troponin    Anemia, chronic renal failure, stage 3 (moderate) (CMS/HCC)    Essential hypertension    Acute on chronic renal insufficiency    Generalized weakness    Falls frequently    Type 2 diabetes mellitus with hyperglycemia (CMS/HCC)    Acute pain of right shoulder    Lower abdominal pain    Acute on chronic anemia    Heme positive stool    Neurogenic orthostatic hypotension (CMS/HCC)    Anemia    History of colon polyps              Jose Enrique Montelongo MD  06/02/20  07:36

## 2020-06-02 NOTE — PLAN OF CARE
Patient  resting  at  this  time.   Has  not  voiced  complaint  of  pain.  Jobst   stocking  on.  Continues  orthostatic  blood  pressure   every   shift  with   noted  drops.    No  stools  this  shift.    SR  with  BBB  and   1 AV  block  on  monitor.   Possible  discharge  today.   Nursing  will  continue  to monitor.  Problem: Patient Care Overview  Goal: Plan of Care Review  Outcome: Ongoing (interventions implemented as appropriate)  Flowsheets (Taken 6/2/2020 4046)  Progress: improving  Plan of Care Reviewed With: patient     Problem: Pain, Acute (Adult)  Goal: Acceptable Pain Control/Comfort Level  Outcome: Ongoing (interventions implemented as appropriate)  Flowsheets (Taken 6/2/2020 7899)  Acceptable Pain Control/Comfort Level: making progress toward outcome

## 2020-06-02 NOTE — PLAN OF CARE
Problem: Patient Care Overview  Goal: Plan of Care Review  Flowsheets (Taken 6/2/2020 1200)  Outcome Summary: Pt was agreeable to therapy. He tolerated increased ambulation distance despite c/o dizziness. he ambualted 20 ft on first walk then 60 ft after a seated rest break.   Patient was wearing a face mask during this therapy encounter. Therapist used appropriate personal protective equipment including mask and gloves.  Mask used was standard procedure mask. Appropriate PPE was worn during the entire therapy session. Hand hygiene was completed before and after therapy session. Patient is not in enhanced droplet precautions.

## 2020-06-02 NOTE — PROGRESS NOTES
"[facility], [facility]  Inpatient Progress Note    Reason for Follow Up: Chronic anemia    Subjective     Interval History: Patient up in chair, describes some lethargy.  Still passing \"black\" stool, he is on iron therapy.  Drop in hemoglobin noted.  Advised with negative findings from upper and lower endoscopy that we consider some form of small bowel evaluation.  Will order a tagged red blood cell scan to see if we can localize source.  We will also monitor H&H in the event it drops further and he requires transfusion.      Current Facility-Administered Medications:   •  aspirin EC tablet 81 mg, 81 mg, Oral, Liban MUIR Michael V, MD, 81 mg at 06/02/20 0728  •  atorvastatin (LIPITOR) tablet 40 mg, 40 mg, Oral, Nightly, Jose Enrique Louie MD, 40 mg at 06/01/20 2118  •  buPROPion XL (WELLBUTRIN XL) 24 hr tablet 150 mg, 150 mg, Oral, Daily, Jose Enrique Louie MD, 150 mg at 06/02/20 0841  •  cholecalciferol (VITAMIN D3) tablet 1,000 Units, 1,000 Units, Oral, Daily, Jose Enrique Louie MD, 1,000 Units at 06/02/20 0841  •  dextrose (D50W) 25 g/ 50mL Intravenous Solution 25 g, 25 g, Intravenous, Q15 Min PRN, Jose Enrique Louie MD, 25 g at 05/29/20 1723  •  dextrose (GLUTOSE) oral gel 15 g, 15 g, Oral, Q15 Min PRN, Jose Enrique Louie MD  •  ferrous sulfate tablet 325 mg, 325 mg, Oral, TID With Meals, Jose Enrique Louie MD, 325 mg at 06/02/20 0841  •  folic acid (FOLVITE) tablet 1 mg, 1 mg, Oral, Daily, Jose Enrique Louie MD, 1 mg at 06/02/20 0841  •  glucagon (human recombinant) (GLUCAGEN DIAGNOSTIC) injection 1 mg, 1 mg, Subcutaneous, Q15 Min PRN, Jose Enrique Louie MD, 1 mg at 05/29/20 1153  •  insulin glargine (LANTUS) injection 15 Units, 15 Units, Subcutaneous, Liban MUIR Michael V, MD, 15 Units at 06/02/20 0842  •  insulin lispro (humaLOG) injection 0-7 Units, 0-7 Units, Subcutaneous, TID AC, Jose Enrique Louie MD, 2 Units at 06/02/20 0840  •  [START ON 6/3/2020] " levothyroxine (SYNTHROID, LEVOTHROID) tablet 75 mcg, 75 mcg, Oral, Q AM, Eliseo Gross MD  •  lidocaine (LIDODERM) 5 % 1 patch, 1 patch, Transdermal, Q24H, Jose Enrique Louie MD, 1 patch at 06/02/20 0841  •  lidocaine (XYLOCAINE) 5 % ointment, , Topical, PRN, Jose Enrique Louie MD  •  metoprolol succinate XL (TOPROL-XL) 24 hr tablet 25 mg, 25 mg, Oral, Q24H, Jose Enrique Louie MD, 25 mg at 06/02/20 0841  •  midodrine (PROAMATINE) tablet 2.5 mg, 2.5 mg, Oral, TID AC, Jose Enrique Louie MD, 2.5 mg at 06/02/20 0728  •  nitroglycerin (NITROSTAT) SL tablet 0.4 mg, 0.4 mg, Sublingual, Q5 Min PRN, Jose Enrique Louie MD  •  ondansetron (ZOFRAN) injection 4 mg, 4 mg, Intravenous, Q6H PRN, Jose Enrique Louie MD  •  Pharmacy to dose warfarin, , Does not apply, Continuous PRN, Jose Enrique Louie MD  •  sodium chloride 0.9 % flush 10 mL, 10 mL, Intravenous, PRN, Jose Enrique Louie MD, 10 mL at 05/29/20 0922  •  tamsulosin (FLOMAX) 24 hr capsule 0.4 mg, 0.4 mg, Oral, Nightly, Jose Enrique Louie MD, 0.4 mg at 06/01/20 2118  •  vitamin B-12 (CYANOCOBALAMIN) tablet 1,000 mcg, 1,000 mcg, Oral, Daily, Jose Enrique Louie MD, 1,000 mcg at 06/02/20 0841  Review of Systems:    All systems were reviewed and negative except for:  Gastrointestinal: positive for  See HPI    Objective     Vital Signs  Temp:  [97.7 °F (36.5 °C)-98.4 °F (36.9 °C)] 98.3 °F (36.8 °C)  Heart Rate:  [55-91] 67  Resp:  [12-18] 16  BP: ()/(50-84) 115/71  Body mass index is 24.11 kg/m².    Intake/Output Summary (Last 24 hours) at 6/2/2020 1021  Last data filed at 6/2/2020 0557  Gross per 24 hour   Intake 120 ml   Output 1200 ml   Net -1080 ml     No intake/output data recorded.     Physical Exam:   General: patient awake, alert and cooperative   Eyes: Normal lids and lashes, no scleral icterus   Neck: supple, normal ROM   Skin: warm and dry, not jaundiced   Cardiovascular: regular rhythm and rate, no murmurs auscultated   Pulm:  clear to auscultation bilaterally, regular and unlabored   Abdomen: soft, nontender, nondistended; normal bowel sounds   Extremities: no rash or edema   Psychiatric: Normal mood and behavior; memory intact     Results Review:     I reviewed the patient's new clinical results.    Results from last 7 days   Lab Units 06/02/20  0640 05/30/20  0643 05/29/20  0704   WBC 10*3/mm3 10.58 7.72 7.02   HEMOGLOBIN g/dL 8.3* 10.1* 9.7*   HEMATOCRIT % 24.9* 30.6* 29.4*   PLATELETS 10*3/mm3 168 180 168     Results from last 7 days   Lab Units 06/02/20  0640 06/01/20  0625 05/31/20  0632  05/27/20  0608   SODIUM mmol/L 131* 137 136   < > 141   POTASSIUM mmol/L 4.7 5.2 5.1   < > 4.0   CHLORIDE mmol/L 104 106 108*   < > 109*   CO2 mmol/L 23.0 24.0 23.0   < > 24.7   BUN mg/dL 34* 39* 41*   < > 35*   CREATININE mg/dL 1.55* 1.64* 1.78*   < > 1.57*   CALCIUM mg/dL 8.5* 8.5* 8.2*   < > 8.4*   BILIRUBIN mg/dL  --   --   --   --  0.2   ALK PHOS U/L  --   --   --   --  56   ALT (SGPT) U/L  --   --   --   --  41   AST (SGOT) U/L  --   --   --   --  19   GLUCOSE mg/dL 160* 114* 109*   < > 60*    < > = values in this interval not displayed.     Results from last 7 days   Lab Units 06/02/20  0640 06/01/20  0625 05/31/20  0632   INR  1.15* 1.11* 1.17*     Lab Results   Lab Value Date/Time    LIPASE 58 11/30/2017 2155    LIPASE 30 08/27/2016 1627    LIPASE 30 02/14/2016 0635       Radiology:  XR Abdomen KUB   Final Result      CT Head Without Contrast   Final Result   Evidence of moderate small vessel chronic ischemic change as   described. Right maxillary sinus polyp. No acute intracranial   abnormalities were identified.       This report was finalized on 5/26/2020 2:11 AM by Dr. Javad Mckee M.D.          XR Shoulder 2+ View Right   Final Result      XR Spine Thoracic 3 View   Final Result          Assessment/Plan     Patient Active Problem List   Diagnosis   • Major depressive disorder with single episode, in partial remission (CMS/[facility])    • Other hyperlipidemia   • Chronic ischemic heart disease   • Vitamin D deficiency   • Erectile dysfunction   • Chronic fatigue   • Type 2 diabetes mellitus with ophthalmic complication (CMS/HCC)   • Skin lesion of chest wall   • Slow transit constipation   • Benign prostatic hyperplasia with nocturia   • CKD (chronic kidney disease) stage 3, GFR 30-59 ml/min (CMS/HCC)   • History of basal cell carcinoma   • High blood pressure associated with diabetes (CMS/HCC)   • Acquired hypothyroidism   • Recurrent strokes (CMS/HCC)   • Urinary retention   • SYLVAIN (acute kidney injury) (CMS/HCC)   • Pneumonia   • Chronic combined systolic and diastolic congestive heart failure (CMS/HCC)   • Acute respiratory failure with hypoxia (CMS/HCC)   • Suspected sleep apnea   • Pleural effusion   • Chronic combined systolic and diastolic congestive heart failure (CMS/HCC)   • YAW on CPAP   • Coronary artery disease involving native coronary artery of native heart without angina pectoris   • Coronary artery disease   • Acute on chronic congestive heart failure (CMS/HCC)   • Elevated troponin   • Colon cancer screening   • Enlarged lymph nodes   • Functional gait abnormality   • History of myocardial infarction   • Hospital discharge follow-up   • Insomnia   • Iron deficiency anemia   • Major depression, recurrent (CMS/HCC)   • Coronary artery disease involving native coronary artery of native heart without angina pectoris   • Anemia, chronic renal failure, stage 3 (moderate) (CMS/HCC)   • Essential hypertension   • Adverse effect of iron and its compounds, initial encounter   • Acute on chronic renal insufficiency   • Generalized weakness   • Falls frequently   • Type 2 diabetes mellitus with hyperglycemia (CMS/HCC)   • Acute pain of right shoulder   • Lower abdominal pain   • Acute on chronic anemia   • Heme positive stool   • Neurogenic orthostatic hypotension (CMS/HCC)   • Anemia   • History of colon polyps        Assessment:  1. Chronic anemia: Drop in hemoglobin.  2. Status post EGD with esophagitis and colonoscopy with small ascending colon polyp removed.  3. History of polyps.      Plan:  · Schedule tagged red blood cell scan  · Monitor H&H every 12 hours  I discussed the patients findings and my recommendations with patient and nursing staff.    Jose Enrique Louie MD

## 2020-06-02 NOTE — PROGRESS NOTES
Pharmacy Consult: Warfarin Dosing/ Monitoring    Carlito Mo is a 65 y.o. male, estimated creatinine clearance is 50.6 mL/min (A) (by C-G formula based on SCr of 1.64 mg/dL (H)). weighing 79.7 kg (175 lb 9.6 oz).     has a past medical history of Acquired hypothyroidism, Acute congestive heart failure (CMS/HCC), Acute respiratory failure with hypoxia (CMS/[facility]), Acute sinusitis, SYLVAIN (acute kidney injury) (CMS/[facility]), Anemia, Arthritis, CAD (coronary artery disease), Cancer (CMS/HCC), Chronic combined systolic and diastolic congestive heart failure (CMS/[facility]), Chronic ischemic heart disease, CKD (chronic kidney disease), COPD (chronic obstructive pulmonary disease) (CMS/[facility]), Depression, Diabetes mellitus type 2, uncontrolled, without complications (CMS/[facility]), Disease of thyroid gland, Fatigue, Frequent PVCs, Heart attack (CMS/[facility]), History of coronary artery disease, History of transfusion, Hyperglycemia, Hyperlipidemia, Hypertension, Hypertensive encephalopathy, Mental status change, YAW on CPAP, Pleural effusion, Pneumonia, RBBB (right bundle branch block), Screening for prostate cancer (2011), Stroke (CMS/[facility]), TIA (transient ischemic attack), Type 2 diabetes mellitus (CMS/[facility]), Urinary retention, and Vitamin D deficiency.    Social History     Tobacco Use    Smoking status: Never Smoker    Smokeless tobacco: Never Used    Tobacco comment: daily caffeine   Substance Use Topics    Alcohol use: No    Drug use: No       Results from last 7 days   Lab Units 06/01/20  0625 05/31/20  0632 05/30/20  0643 05/29/20  0704 05/28/20  0653 05/27/20  0608 05/26/20  0603  05/25/20  1557   INR  1.11* 1.17* 1.24* 1.50* 2.04* 2.12* 2.04*   < > 1.69*   HEMOGLOBIN g/dL  --   --  10.1* 9.7* 9.9* 10.5* 10.9*  --  11.5*   HEMATOCRIT %  --   --  30.6* 29.4* 30.3* 32.3* 32.4*  --  35.8*   PLATELETS 10*3/mm3  --   --  180 168 185 205 221  --  241    < > = values in this interval not displayed.     Results from last 7 days   Lab Units  06/01/20  0625 05/31/20  0632 05/30/20  0643  05/27/20  0608 05/26/20  0603 05/25/20  1557   SODIUM mmol/L 137 136 140   < > 141 139 134*   POTASSIUM mmol/L 5.2 5.1 4.9   < > 4.0 4.3 4.7   CHLORIDE mmol/L 106 108* 107   < > 109* 107 99   CO2 mmol/L 24.0 23.0 25.3   < > 24.7 24.9 25.3   BUN mg/dL 39* 41* 33*   < > 35* 51* 60*   CREATININE mg/dL 1.64* 1.78* 1.61*   < > 1.57* 1.95* 2.59*   CALCIUM mg/dL 8.5* 8.2* 8.6   < > 8.4* 8.7 8.8   BILIRUBIN mg/dL  --   --   --   --  0.2 0.2 0.3   ALK PHOS U/L  --   --   --   --  56 61 72   ALT (SGPT) U/L  --   --   --   --  41 46* 54*   AST (SGOT) U/L  --   --   --   --  19 15 15   GLUCOSE mg/dL 114* 109* 232*   < > 60* 78 398*    < > = values in this interval not displayed.     Anticoagulation history: h/o recurrent strokes anticoagulated on warfarin. Managed by BRENDAN Ramesh, last noted checkup 3/27/20 - INR 2.48, warfarin 8mg M/Th and Warfarin 6mg all other days (also confirmed by patient). Regimen has largely provided therapeutic concentrations over past 6 months.    Hospital Anticoagulation:  Consulting provider: Dr. Awad  Start date: 5/25/20  Indication: Other-full anticoagulation, recurrent strokes  Target INR: 2-3  Expected duration: Long-term   Bridge Therapy: No    Date 5/25 5/26 5/27 5/28 5/29 5/30 5/31 6/1   6/2    INR 1.69 2.04 2.12 2.04 1.50 1.24 1.17 1.11 1.15    Warfarin dose 8mg (taken pta) 6mg held held held held held held  held      Potential drug interactions:   Aspirin - may increase risk of bleeding  Levothyroxine - May enhance the anticoagulant effect of warfarin.     Relevant nutrition status: Regular cardiac diet    Other: 5/29 EGD- significant esophagitis     Education complete?/ Date: Not at this time    Assessment/Plan:  Today's INR= 1.15   per cardiology::::Resume warfarin when ok with GI. Hgb dropped today and tagged red blood cell scan has been ordered by GI    Pharmacy will continue to follow until discharge or discontinuation of warfarin.     Thank you  for allowing me to participate in the care of your patient,   Rhonda Haque, [facility]  6/2/20

## 2020-06-03 ENCOUNTER — TELEPHONE (OUTPATIENT)
Dept: GASTROENTEROLOGY | Facility: CLINIC | Age: 65
End: 2020-06-03

## 2020-06-03 DIAGNOSIS — K20.90 ESOPHAGITIS: Primary | ICD-10-CM

## 2020-06-03 PROBLEM — N17.9 AKI (ACUTE KIDNEY INJURY): Status: RESOLVED | Noted: 2018-09-20 | Resolved: 2020-06-03

## 2020-06-03 LAB
ABO GROUP BLD: NORMAL
ALBUMIN SERPL-MCNC: 2.6 G/DL (ref 3.5–5.2)
ANION GAP SERPL CALCULATED.3IONS-SCNC: 5.7 MMOL/L (ref 5–15)
BASOPHILS # BLD AUTO: 0.01 10*3/MM3 (ref 0–0.2)
BASOPHILS NFR BLD AUTO: 0.1 % (ref 0–1.5)
BLD GP AB SCN SERPL QL: NEGATIVE
BUN BLD-MCNC: 42 MG/DL (ref 8–23)
BUN/CREAT SERPL: 26.9 (ref 7–25)
CALCIUM SPEC-SCNC: 8.4 MG/DL (ref 8.6–10.5)
CHLORIDE SERPL-SCNC: 108 MMOL/L (ref 98–107)
CO2 SERPL-SCNC: 24.3 MMOL/L (ref 22–29)
CREAT BLD-MCNC: 1.56 MG/DL (ref 0.76–1.27)
DEPRECATED RDW RBC AUTO: 44.2 FL (ref 37–54)
EOSINOPHIL # BLD AUTO: 0.18 10*3/MM3 (ref 0–0.4)
EOSINOPHIL NFR BLD AUTO: 2.6 % (ref 0.3–6.2)
ERYTHROCYTE [DISTWIDTH] IN BLOOD BY AUTOMATED COUNT: 14.6 % (ref 12.3–15.4)
GFR SERPL CREATININE-BSD FRML MDRD: 45 ML/MIN/1.73
GLUCOSE BLD-MCNC: 98 MG/DL (ref 65–99)
GLUCOSE BLDC GLUCOMTR-MCNC: 105 MG/DL (ref 70–130)
GLUCOSE BLDC GLUCOMTR-MCNC: 194 MG/DL (ref 70–130)
GLUCOSE BLDC GLUCOMTR-MCNC: 202 MG/DL (ref 70–130)
GLUCOSE BLDC GLUCOMTR-MCNC: 261 MG/DL (ref 70–130)
HCT VFR BLD AUTO: 23.8 % (ref 37.5–51)
HGB BLD-MCNC: 7.8 G/DL (ref 13–17.7)
IMM GRANULOCYTES # BLD AUTO: 0.04 10*3/MM3 (ref 0–0.05)
IMM GRANULOCYTES NFR BLD AUTO: 0.6 % (ref 0–0.5)
INR PPP: 1.14 (ref 0.9–1.1)
LYMPHOCYTES # BLD AUTO: 1.18 10*3/MM3 (ref 0.7–3.1)
LYMPHOCYTES NFR BLD AUTO: 17.3 % (ref 19.6–45.3)
MCH RBC QN AUTO: 27.2 PG (ref 26.6–33)
MCHC RBC AUTO-ENTMCNC: 32.8 G/DL (ref 31.5–35.7)
MCV RBC AUTO: 82.9 FL (ref 79–97)
MONOCYTES # BLD AUTO: 0.65 10*3/MM3 (ref 0.1–0.9)
MONOCYTES NFR BLD AUTO: 9.5 % (ref 5–12)
NEUTROPHILS # BLD AUTO: 4.78 10*3/MM3 (ref 1.7–7)
NEUTROPHILS NFR BLD AUTO: 69.9 % (ref 42.7–76)
NRBC BLD AUTO-RTO: 0 /100 WBC (ref 0–0.2)
PHOSPHATE SERPL-MCNC: 3.5 MG/DL (ref 2.5–4.5)
PLATELET # BLD AUTO: 160 10*3/MM3 (ref 140–450)
PMV BLD AUTO: 10.3 FL (ref 6–12)
POTASSIUM BLD-SCNC: 5 MMOL/L (ref 3.5–5.2)
PROTHROMBIN TIME: 14.3 SECONDS (ref 11.7–14.2)
RBC # BLD AUTO: 2.87 10*6/MM3 (ref 4.14–5.8)
RH BLD: POSITIVE
SODIUM BLD-SCNC: 138 MMOL/L (ref 136–145)
T&S EXPIRATION DATE: NORMAL
WBC NRBC COR # BLD: 6.84 10*3/MM3 (ref 3.4–10.8)

## 2020-06-03 PROCEDURE — 99232 SBSQ HOSP IP/OBS MODERATE 35: CPT | Performed by: NURSE PRACTITIONER

## 2020-06-03 PROCEDURE — 85025 COMPLETE CBC W/AUTO DIFF WBC: CPT | Performed by: INTERNAL MEDICINE

## 2020-06-03 PROCEDURE — 97110 THERAPEUTIC EXERCISES: CPT

## 2020-06-03 PROCEDURE — P9016 RBC LEUKOCYTES REDUCED: HCPCS

## 2020-06-03 PROCEDURE — 86901 BLOOD TYPING SEROLOGIC RH(D): CPT | Performed by: INTERNAL MEDICINE

## 2020-06-03 PROCEDURE — 86923 COMPATIBILITY TEST ELECTRIC: CPT

## 2020-06-03 PROCEDURE — 85610 PROTHROMBIN TIME: CPT | Performed by: INTERNAL MEDICINE

## 2020-06-03 PROCEDURE — 86900 BLOOD TYPING SEROLOGIC ABO: CPT

## 2020-06-03 PROCEDURE — 63710000001 INSULIN LISPRO (HUMAN) PER 5 UNITS: Performed by: INTERNAL MEDICINE

## 2020-06-03 PROCEDURE — 80069 RENAL FUNCTION PANEL: CPT | Performed by: INTERNAL MEDICINE

## 2020-06-03 PROCEDURE — 86900 BLOOD TYPING SEROLOGIC ABO: CPT | Performed by: INTERNAL MEDICINE

## 2020-06-03 PROCEDURE — 99232 SBSQ HOSP IP/OBS MODERATE 35: CPT | Performed by: INTERNAL MEDICINE

## 2020-06-03 PROCEDURE — 82962 GLUCOSE BLOOD TEST: CPT

## 2020-06-03 PROCEDURE — 86850 RBC ANTIBODY SCREEN: CPT | Performed by: INTERNAL MEDICINE

## 2020-06-03 PROCEDURE — 63710000001 INSULIN GLARGINE PER 5 UNITS: Performed by: INTERNAL MEDICINE

## 2020-06-03 PROCEDURE — 36430 TRANSFUSION BLD/BLD COMPNT: CPT

## 2020-06-03 RX ORDER — PANTOPRAZOLE SODIUM 40 MG/1
40 TABLET, DELAYED RELEASE ORAL
Status: DISCONTINUED | OUTPATIENT
Start: 2020-06-04 | End: 2020-06-04 | Stop reason: HOSPADM

## 2020-06-03 RX ORDER — BISMUTH SUBSALICYLATE 262 MG/1
524 TABLET, CHEWABLE ORAL EVERY 6 HOURS
Status: DISCONTINUED | OUTPATIENT
Start: 2020-06-03 | End: 2020-06-04 | Stop reason: HOSPADM

## 2020-06-03 RX ORDER — DOXYCYCLINE 100 MG/1
100 CAPSULE ORAL EVERY 12 HOURS SCHEDULED
Status: DISCONTINUED | OUTPATIENT
Start: 2020-06-03 | End: 2020-06-04 | Stop reason: HOSPADM

## 2020-06-03 RX ORDER — METRONIDAZOLE 250 MG/1
250 TABLET ORAL EVERY 6 HOURS SCHEDULED
Status: DISCONTINUED | OUTPATIENT
Start: 2020-06-03 | End: 2020-06-04 | Stop reason: HOSPADM

## 2020-06-03 RX ADMIN — Medication 1000 MCG: at 09:18

## 2020-06-03 RX ADMIN — INSULIN GLARGINE 15 UNITS: 100 INJECTION, SOLUTION SUBCUTANEOUS at 07:04

## 2020-06-03 RX ADMIN — Medication 524 MG: at 20:15

## 2020-06-03 RX ADMIN — TAMSULOSIN HYDROCHLORIDE 0.4 MG: 0.4 CAPSULE ORAL at 20:15

## 2020-06-03 RX ADMIN — INSULIN LISPRO 3 UNITS: 100 INJECTION, SOLUTION INTRAVENOUS; SUBCUTANEOUS at 11:55

## 2020-06-03 RX ADMIN — MIDODRINE HYDROCHLORIDE 10 MG: 5 TABLET ORAL at 07:04

## 2020-06-03 RX ADMIN — INSULIN LISPRO 2 UNITS: 100 INJECTION, SOLUTION INTRAVENOUS; SUBCUTANEOUS at 17:55

## 2020-06-03 RX ADMIN — ATORVASTATIN CALCIUM 40 MG: 20 TABLET, FILM COATED ORAL at 20:15

## 2020-06-03 RX ADMIN — INSULIN LISPRO 5 UNITS: 100 INJECTION, SOLUTION INTRAVENOUS; SUBCUTANEOUS at 11:55

## 2020-06-03 RX ADMIN — MIDODRINE HYDROCHLORIDE 10 MG: 5 TABLET ORAL at 11:54

## 2020-06-03 RX ADMIN — Medication 524 MG: at 14:54

## 2020-06-03 RX ADMIN — SODIUM CHLORIDE, PRESERVATIVE FREE 10 ML: 5 INJECTION INTRAVENOUS at 09:19

## 2020-06-03 RX ADMIN — FERROUS SULFATE TAB 325 MG (65 MG ELEMENTAL FE) 325 MG: 325 (65 FE) TAB at 09:18

## 2020-06-03 RX ADMIN — FOLIC ACID 1 MG: 1 TABLET ORAL at 09:18

## 2020-06-03 RX ADMIN — METRONIDAZOLE 250 MG: 250 TABLET ORAL at 14:39

## 2020-06-03 RX ADMIN — DOXYCYCLINE 100 MG: 100 CAPSULE ORAL at 20:15

## 2020-06-03 RX ADMIN — INSULIN LISPRO 5 UNITS: 100 INJECTION, SOLUTION INTRAVENOUS; SUBCUTANEOUS at 09:19

## 2020-06-03 RX ADMIN — LIDOCAINE: 50 OINTMENT TOPICAL at 20:15

## 2020-06-03 RX ADMIN — MIDODRINE HYDROCHLORIDE 10 MG: 5 TABLET ORAL at 17:54

## 2020-06-03 RX ADMIN — VITAMIN D, TAB 1000IU (100/BT) 1000 UNITS: 25 TAB at 09:19

## 2020-06-03 RX ADMIN — INSULIN LISPRO 5 UNITS: 100 INJECTION, SOLUTION INTRAVENOUS; SUBCUTANEOUS at 17:55

## 2020-06-03 RX ADMIN — DOXYCYCLINE 100 MG: 100 CAPSULE ORAL at 14:39

## 2020-06-03 RX ADMIN — FERROUS SULFATE TAB 325 MG (65 MG ELEMENTAL FE) 325 MG: 325 (65 FE) TAB at 11:54

## 2020-06-03 RX ADMIN — LEVOTHYROXINE SODIUM 75 MCG: 75 TABLET ORAL at 07:00

## 2020-06-03 RX ADMIN — ASPIRIN 81 MG: 81 TABLET, COATED ORAL at 07:04

## 2020-06-03 RX ADMIN — LIDOCAINE 1 PATCH: 50 PATCH TOPICAL at 09:19

## 2020-06-03 RX ADMIN — BUPROPION HYDROCHLORIDE 150 MG: 150 TABLET, FILM COATED, EXTENDED RELEASE ORAL at 09:19

## 2020-06-03 RX ADMIN — FERROUS SULFATE TAB 325 MG (65 MG ELEMENTAL FE) 325 MG: 325 (65 FE) TAB at 17:54

## 2020-06-03 RX ADMIN — METRONIDAZOLE 250 MG: 250 TABLET ORAL at 23:26

## 2020-06-03 NOTE — TELEPHONE ENCOUNTER
He will need 3 year recall on the Kaur's but office follow up with Dr Louie as he will likely need to repeat the EGD in ~3 months to reassess esophagitis

## 2020-06-03 NOTE — PROGRESS NOTES
Name: Carlito Mo ADMIT: 2020   : 1955  PCP: Florencia Caballero MD    MRN: [id number] LOS: 8 days   AGE/SEX: 65 y.o. male  ROOM: [facility]     Subjective   Subjective   CC: generalized weakness, light-headedness on standing  No acute events. Patient feels about the same. Taking PO. No CP/dyspnea/f/c/n/v/d.  Orthostatic symptoms are unchanged.      Objective   Objective   Vital Signs  Temp:  [97.7 °F (36.5 °C)-97.9 °F (36.6 °C)] 97.7 °F (36.5 °C)  Heart Rate:  [59-68] 59  Resp:  [16-18] 16  BP: ()/(52-78) 135/78  SpO2:  [94 %-98 %] 95 %  on   ;   Device (Oxygen Therapy): room air  Body mass index is 24.24 kg/m².  Physical Exam   Constitutional: He is oriented to person, place, and time. He has a sickly appearance. No distress.   HENT:   Head: Normocephalic and atraumatic.   Mouth/Throat: Oropharynx is clear and moist.   Eyes: Pupils are equal, round, and reactive to light. Conjunctivae and EOM are normal.   Neck: Neck supple. No JVD present.   Cardiovascular: Normal rate, normal heart sounds and intact distal pulses.   Pulmonary/Chest: Effort normal and breath sounds normal. No respiratory distress.   Abdominal: Soft. Bowel sounds are normal. He exhibits no distension. There is no tenderness.   Musculoskeletal: He exhibits no edema or deformity.   Neurological: He is alert and oriented to person, place, and time.   Skin: Skin is warm and dry. He is not diaphoretic.   Psychiatric: He has a normal mood and affect. His behavior is normal.   Nursing note and vitals reviewed.     Results Review:       I reviewed the patient's new clinical results.  I reviewed the patient's telemetry.  Results from last 7 days   Lab Units 20  0602 20  1239 20  0640 20  0643 20  0704   WBC 10*3/mm3 6.84  --  10.58 7.72 7.02   HEMOGLOBIN g/dL 7.8* 8.4* 8.3* 10.1* 9.7*   PLATELETS 10*3/mm3 160  --  168 180 168     Results from last 7 days   Lab Units 20  0602 20  0640  06/01/20  0625 05/31/20  0632   SODIUM mmol/L 138 131* 137 136   POTASSIUM mmol/L 5.0 4.7 5.2 5.1   CHLORIDE mmol/L 108* 104 106 108*   CO2 mmol/L 24.3 23.0 24.0 23.0   BUN mg/dL 42* 34* 39* 41*   CREATININE mg/dL 1.56* 1.55* 1.64* 1.78*   GLUCOSE mg/dL 98 160* 114* 109*   Estimated Creatinine Clearance: 52.6 mL/min (A) (by C-G formula based on SCr of 1.56 mg/dL (H)).  Results from last 7 days   Lab Units 06/03/20  0602 06/02/20  0640 05/31/20  0632 05/30/20  0643   ALBUMIN g/dL 2.60* 2.70* 2.80* 3.00*     Results from last 7 days   Lab Units 06/03/20  0602 06/02/20  0640 06/01/20  0625 05/31/20  0632 05/30/20  0643   CALCIUM mg/dL 8.4* 8.5* 8.5* 8.2* 8.6   ALBUMIN g/dL 2.60* 2.70*  --  2.80* 3.00*   MAGNESIUM mg/dL  --   --   --  2.0  --    PHOSPHORUS mg/dL 3.5 2.8  --  3.1 3.1         Glucose   Date/Time Value Ref Range Status   06/03/2020 1110 202 (H) 70 - 130 mg/dL Final   06/03/2020 0602 105 70 - 130 mg/dL Final   06/02/2020 2122 156 (H) 70 - 130 mg/dL Final   06/02/2020 1642 164 (H) 70 - 130 mg/dL Final   06/02/2020 1141 294 (H) 70 - 130 mg/dL Final   06/02/2020 0556 167 (H) 70 - 130 mg/dL Final   06/01/2020 2101 278 (H) 70 - 130 mg/dL Final       NM GI Blood Loss  Narrative: NM GI BLOOD LOSS-     INDICATIONS: Gastrointestinal bleeding     TECHNIQUE: Bleeding scan     COMPARISON: None available     FINDINGS:     Radiotracer: 30.3 mCi technetium 99m radiolabeled red blood cells,  intravenous. Projections of the abdomen and pelvis were obtained at 2  seconds interval is for 2 minutes, then 2 minute intervals for 60  minutes. Cine sequence was created.        Expected soft tissue localization of radiotracer is noted. No intestinal  hemorrhage is identified at the time of the exam.           Impression:    No intestinal hemorrhage is identified at the time of the exam.  Follow-up/further evaluation can be obtained as indications persist.     This report was finalized on 6/2/2020 2:35 PM by Dr. Drake FIGUEROA  NOEMÍ Dennis.         aspirin 81 mg Oral QAM   atorvastatin 40 mg Oral Nightly   buPROPion  mg Oral Daily   cholecalciferol 1,000 Units Oral Daily   ferrous sulfate 325 mg Oral TID With Meals   folic acid 1 mg Oral Daily   heparin 0.3 mL Other Once in imaging   insulin glargine 15 Units Subcutaneous QAM   insulin lispro 0-7 Units Subcutaneous TID AC   insulin lispro 5 Units Subcutaneous TID With Meals   levothyroxine 75 mcg Oral Q AM   lidocaine 1 patch Transdermal Q24H   midodrine 10 mg Oral TID AC   tamsulosin 0.4 mg Oral Nightly   vitamin B-12 1,000 mcg Oral Daily       Pharmacy to dose warfarin    Diet Regular; Consistent Carbohydrate, Low Potassium       Assessment/Plan     Active Hospital Problems    Diagnosis  POA   • **SYLVAIN (acute kidney injury) (CMS/HCC) [N17.9]  Yes   • Neurogenic orthostatic hypotension (CMS/HCC) [G90.3]  Yes   • Acute pain of right shoulder [M25.511]  Yes   • Lower abdominal pain [R10.30]  Yes   • Acute on chronic renal insufficiency [N28.9, N18.9]  Yes   • Generalized weakness [R53.1]  Yes   • Falls frequently [R29.6]  Not Applicable   • Type 2 diabetes mellitus with hyperglycemia (CMS/[facility]) [E11.65]  Yes   • Acute on chronic anemia [D64.9]  Yes   • Heme positive stool [R19.5]  Yes   • Anemia [D64.9]  Unknown   • History of colon polyps [Z86.010]  Unknown   • Essential hypertension [I10]  Yes   • Anemia, chronic renal failure, stage 3 (moderate) (CMS/HCC) [N18.3, D63.1]  Yes   • Elevated troponin [R79.89]  Yes   • YAW on CPAP [G47.33, Z99.89]  Not Applicable   • Chronic combined systolic and diastolic congestive heart failure (CMS/HCC) [I50.42]  Yes   • Recurrent strokes (CMS/HCC) [I63.9]  Yes   • Acquired hypothyroidism [E03.9]  Yes   • CKD (chronic kidney disease) stage 3, GFR 30-59 ml/min (CMS/HCC) [N18.3]  Yes   • Chronic ischemic heart disease [I25.9]  Yes      Resolved Hospital Problems   No resolved problems to display.   Symptomatic Anemia  - likely source of weakness and  falls and likely a factor in his orthostasis  - acute due to blood loss superimposed on chronic anemia from CKD stage 3  - FOBT is positive-EGD 5/29 with esophagitis and gastritis (biopsied) as well as small hiatal hernia, continue on BID PPI-will need follow up EGD in 3 months to assess healing  - colonoscopy 6/1/20 with stool in the colon and an ascending colon polyp which was removed   - continue Fe replacement  - tagged RBC scan is negative  - given ongoing symptoms and orthostasis will transfuse a unit of PRBCs  - appreciate GI recs    SYLVAIN on CKD Stage 3  - pre-renal from hypotension, hypovolemia in the setting of lisinopril and lasix which are held  - creatinine 2.59 on admission, stable-baseline creatinine is 1.8  - appreciate nephrology recs    Orthostatic Hypotension  - persistent despite volume correction and holding amlodipine  - likely due to autonomic neuropathy from diabetes mellitus  - midodrine increased   - compression stockings changed to thigh-high  - PRBCs as above  - can consider abdominal binder if this issue persists and volume/anemia optimized  - will follow orthostatic blood pressures  - stop metoprolol    Ischemic Heart Disease with Chronic Combined Systolic and Diastolic CHF  - no anginal symptoms, continue ASA, statin; troponin is chronically elevated  - off amlodipine due to orthostatic hypotension, also stopped metoprolol-will monitor for anginal symptoms  - monitor volume status in the hospital  - appreciate cardiology recs    Type 2 DM  - complications include nephropathy, retinopathy, and neuropathy  - continue 15 units lantus QAM   - humalog 5 units TID with meals  - cover with ssi/hypoglycemia protocol    SCDs for DVT prophylaxis.  Full code.  Discussed with patient, nursing staff, consulting provider and care team on multidisciplinary rounds.  Disposition to be determined.    D/w Dr. Reginald Gross MD  [facility]  06/03/20  11:59

## 2020-06-03 NOTE — PLAN OF CARE
Patient alert follows commands, assist with brp. No c/o pain or nausea noted. All oral meds given. Removed lidocaine patch from right shoulder. 328 bladder scan this am. Orthostatic completed this shift. No acute distress noted will continue to monitor.

## 2020-06-03 NOTE — PLAN OF CARE
Problem: Patient Care Overview  Goal: Plan of Care Review  Outcome: Ongoing (interventions implemented as appropriate)  Flowsheets  Taken 6/3/2020 1428  Progress: no change  Plan of Care Reviewed With: patient  Taken 6/3/2020 1423  Outcome Summary: Pt amb 10-15' to bathroom and then 20' back to recliner chair using cane. Pt c/o fatigue and exhaustion after attempting to use bathroom. Pt may benefit from trial of r wx due to decreased balance, c/o dizziness, and decreased endurance with activities. Pt has cane at home that he uses with stairs but he may benefit from r wx for amb of any significant distances. Cont PT for ther ex, gt/transfer training, HEP progression, and balance as tolerated.  Patient was wearing a face mask during this therapy encounter. Therapist used appropriate personal protective equipment including mask and gloves.  Mask used was standard procedure mask. Appropriate PPE was worn during the entire therapy session. Hand hygiene was completed before and after therapy session. Patient is not in enhanced droplet precautions.

## 2020-06-03 NOTE — THERAPY TREATMENT NOTE
Patient Name: Carlito Mo  : 1955    MRN: [id number]                              Today's Date: 6/3/2020       Admit Date: 2020    Visit Dx:     ICD-10-CM ICD-9-CM   1. Acute on chronic renal insufficiency N28.9 593.9    N18.9 585.9   2. Generalized weakness R53.1 780.79   3. Elevated troponin R79.89 790.6   4. Contusion of right shoulder, initial encounter S40.011A 923.00   5. Upper back strain, initial encounter S29.012A 847.1   6. Lightheadedness R42 780.4   7. Acute on chronic anemia D64.9 285.9   8. Heme positive stool R19.5 792.1   9. Anemia D64.9 285.9   10. History of colon polyps Z86.010 V12.72     Patient Active Problem List   Diagnosis   • Major depressive disorder with single episode, in partial remission (CMS/HCC)   • Other hyperlipidemia   • Chronic ischemic heart disease   • Vitamin D deficiency   • Erectile dysfunction   • Chronic fatigue   • Type 2 diabetes mellitus with ophthalmic complication (CMS/HCC)   • Skin lesion of chest wall   • Slow transit constipation   • Benign prostatic hyperplasia with nocturia   • CKD (chronic kidney disease) stage 3, GFR 30-59 ml/min (CMS/HCC)   • History of basal cell carcinoma   • High blood pressure associated with diabetes (CMS/HCC)   • Acquired hypothyroidism   • Recurrent strokes (CMS/HCC)   • Urinary retention   • Pneumonia   • Chronic combined systolic and diastolic congestive heart failure (CMS/HCC)   • Acute respiratory failure with hypoxia (CMS/HCC)   • Suspected sleep apnea   • Pleural effusion   • Chronic combined systolic and diastolic congestive heart failure (CMS/HCC)   • YAW on CPAP   • Coronary artery disease involving native coronary artery of native heart without angina pectoris   • Coronary artery disease   • Acute on chronic congestive heart failure (CMS/HCC)   • Elevated troponin   • Colon cancer screening   • Enlarged lymph nodes   • Functional gait abnormality   • History of myocardial infarction   • Hospital discharge  follow-up   • Insomnia   • Iron deficiency anemia   • Major depression, recurrent (CMS/HCC)   • Coronary artery disease involving native coronary artery of native heart without angina pectoris   • Anemia, chronic renal failure, stage 3 (moderate) (CMS/HCC)   • Essential hypertension   • Adverse effect of iron and its compounds, initial encounter   • Acute on chronic renal insufficiency   • Generalized weakness   • Falls frequently   • Type 2 diabetes mellitus with hyperglycemia (CMS/HCC)   • Acute pain of right shoulder   • Lower abdominal pain   • Acute on chronic anemia   • Heme positive stool   • Neurogenic orthostatic hypotension (CMS/HCC)   • Symptomatic anemia   • History of colon polyps     Past Medical History:   Diagnosis Date   • Acquired hypothyroidism    • Acute congestive heart failure (CMS/HCC)    • Acute respiratory failure with hypoxia (CMS/HCC)    • Acute sinusitis    • SYLVAIN (acute kidney injury) (CMS/HCC)     on CKD   • Anemia    • Arthritis    • CAD (coronary artery disease)    • Cancer (CMS/HCC)     skin   • Chronic combined systolic and diastolic congestive heart failure (CMS/HCC)    • Chronic ischemic heart disease    • CKD (chronic kidney disease)    • COPD (chronic obstructive pulmonary disease) (CMS/HCC)    • Depression    • Diabetes mellitus type 2, uncontrolled, without complications (CMS/HCC)    • Disease of thyroid gland    • Fatigue    • Frequent PVCs    • Heart attack (CMS/HCC)    • History of coronary artery disease     with remote history of bypass surgery in 2001   • History of transfusion    • Hyperglycemia    • Hyperlipidemia    • Hypertension    • Hypertensive encephalopathy    • Mental status change     Acute   • YAW on CPAP    • Pleural effusion    • Pneumonia    • RBBB (right bundle branch block)    • Screening for prostate cancer 2011   • Stroke (CMS/HCC)    • TIA (transient ischemic attack)    • Type 2 diabetes mellitus (CMS/HCC)    • Urinary retention    • Vitamin D  deficiency      Past Surgical History:   Procedure Laterality Date   • APPENDECTOMY N/A 02/14/2016    Dr. Alexey Dodson   • COLONOSCOPY      over 20 years ago.  no polyps    • COLONOSCOPY N/A 9/18/2018    Procedure: COLONOSCOPY;  Surgeon: Jose Enrique Louie MD;  Location: [facility] ENDOSCOPY;  Service: Gastroenterology   • COLONOSCOPY N/A 9/19/2018    IH, EH, polyps (TAs w/low grade dysplasia)   • COLONOSCOPY N/A 6/1/2020    Procedure: COLONOSCOPY;  Surgeon: Jose Enrique Louie MD;  Location: [facility] ENDOSCOPY;  Service: Gastroenterology;  Laterality: N/A;  Pre op-Anemia, Melena, History of Polyps  Post op-To Cecum/TI, Polyp, Poor Prep   • CORONARY ARTERY BYPASS GRAFT  11/2001   • ENDOSCOPY N/A 5/29/2020    Procedure: ESOPHAGOGASTRODUODENOSCOPY with biopsies;  Surgeon: Amanda Lovelace MD;  Location: [facility] ENDOSCOPY;  Service: Gastroenterology;  Laterality: N/A;  pre-anemia, dark stools  post-esophagitis, hiatal hernia   • HERNIA REPAIR Left 12/05/2019   • TONSILLECTOMY     • VASECTOMY       General Information     Row Name 06/03/20 1404          PT Evaluation Time/Intention    Document Type  therapy note (daily note)  -DJ     Mode of Treatment  physical therapy;individual therapy  -     Row Name 06/03/20 1405          General Information    Patient Profile Reviewed?  yes  -DJ     Existing Precautions/Restrictions  fall  -DJ     Row Name 06/03/20 1405          Cognitive Assessment/Intervention- PT/OT    Orientation Status (Cognition)  oriented x 3  -DJ     Cognitive Assessment/Intervention Comment  Pleasant and cooperative; eager to get up and use bathroom  -     Row Name 06/03/20 1406          Safety Issues, Functional Mobility    Impairments Affecting Function (Mobility)  endurance/activity tolerance;balance  -DJ       User Key  (r) = Recorded By, (t) = Taken By, (c) = Cosigned By    Initials Name Provider Type    Leslie Patel, PT Physical Therapist        Mobility     Row Name 06/03/20 1407           Bed Mobility Assessment/Treatment    Bed Mobility Assessment/Treatment  supine-sit  -DJ     Supine-Sit Robeson (Bed Mobility)  contact guard;verbal cues  -DJ     Row Name 06/03/20 1406          Transfer Assessment/Treatment    Comment (Transfers)  sit/stand from EOB and commode using cane  -DJ     Row Name 06/03/20 1406          Bed-Chair Transfer    Bed-Chair Robeson (Transfers)  not tested  -DJ     Row Name 06/03/20 1406          Sit-Stand Transfer    Sit-Stand Robeson (Transfers)  contact guard;verbal cues  -DJ     Assistive Device (Sit-Stand Transfers)  walker, front-wheeled  -DJ     Row Name 06/03/20 1406          Gait/Stairs Assessment/Training    Gait/Stairs Assessment/Training  gait/ambulation assistive device;distance ambulated;gait pattern  -DJ     Robeson Level (Gait)  contact guard;2 person assist  -DJ     Assistive Device (Gait)  cane, straight  -DJ     Distance in Feet (Gait)  15' to bathroom; 20-25' back to recliner chair  -DJ     Pattern (Gait)  step-to  -DJ     Deviations/Abnormal Patterns (Gait)  gait speed decreased;eli decreased;festinating/shuffling  -DJ     Bilateral Gait Deviations  forward flexed posture  -DJ     Comment (Gait/Stairs)  Pt amb 10-15' to bathroom and then 20' back to recliner chair using cane. Pt c/o fatigue and exhaustion after attempting to use bathroom. Pt may benefit from trial of r wx due to decreased balance, c/o dizziness, and decreased endurance with activities. Pt has cane at home that he uses with stairs but he may benefit from r wx for amb of any significance  -DJ       User Key  (r) = Recorded By, (t) = Taken By, (c) = Cosigned By    Initials Name Provider Type    Leslie Patel, PT Physical Therapist        Obj/Interventions     Row Name 06/03/20 1412          Static Sitting Balance    Level of Robeson (Unsupported Sitting, Static Balance)  supervision  -DJ     Sitting Position (Unsupported Sitting, Static Balance)  sitting on edge of  bed  -DJ     Time Able to Maintain Position (Unsupported Sitting, Static Balance)  45 to 60 seconds  -DJ       User Key  (r) = Recorded By, (t) = Taken By, (c) = Cosigned By    Initials Name Provider Type    Leslie Patel, PT Physical Therapist        Goals/Plan    No documentation.       Clinical Impression     Row Name 06/03/20 1423          Pain Scale: Numbers Pre/Post-Treatment    Pre/Post Treatment Pain Comment  c/c is exhaustion but he does voice that he wants to try to amb later in day with nsg staff  -DJ     Row Name 06/03/20 1423          Plan of Care Review    Plan of Care Reviewed With  patient  -DJ     Progress  no change  -DJ     Outcome Summary  Pt amb 10-15' to bathroom and then 20' back to recliner chair using cane. Pt c/o fatigue and exhaustion after attempting to use bathroom. Pt may benefit from trial of r wx due to decreased balance, c/o dizziness, and decreased endurance with activities. Pt has cane at home that he uses with stairs but he may benefit from r wx for amb of any significance Cont PT for ther ex, gt/transfer training, HEP progression, and balance as tolerated.  -DJ     Row Name 06/03/20 1423          Physical Therapy Clinical Impression    Criteria for Skilled Interventions Met (PT Clinical Impression)  yes;treatment indicated  -DJ     Row Name 06/03/20 1423          Vital Signs    Post Systolic BP Rehab  108  -DJ     Post Treatment Diastolic BP  61  -DJ     O2 Delivery Pre Treatment  room air  -DJ     O2 Delivery Intra Treatment  room air  -DJ     O2 Delivery Post Treatment  room air  -DJ     Pre Patient Position  Supine  -DJ     Intra Patient Position  Standing  -DJ     Post Patient Position  Sitting  -DJ     Row Name 06/03/20 1423          Positioning and Restraints    Pre-Treatment Position  in bed  -DJ     Post Treatment Position  chair  -DJ     In Chair  reclined;call light within reach;notified nsg;encouraged to call for assist;exit alarm on;legs elevated  -DJ       User Key   (r) = Recorded By, (t) = Taken By, (c) = Cosigned By    Initials Name Provider Type    Leslie Patel PT Physical Therapist        Outcome Measures     Row Name 06/03/20 1427          How much help from another person do you currently need...    Turning from your back to your side while in flat bed without using bedrails?  4  -DJ     Moving from lying on back to sitting on the side of a flat bed without bedrails?  4  -DJ     Moving to and from a bed to a chair (including a wheelchair)?  3  -DJ     Standing up from a chair using your arms (e.g., wheelchair, bedside chair)?  3  -DJ     Climbing 3-5 steps with a railing?  2  -DJ     To walk in hospital room?  3  -DJ     AM-PAC 6 Clicks Score (PT)  19  -DJ     Row Name 06/03/20 1427          Functional Assessment    Outcome Measure Options  AM-PAC 6 Clicks Basic Mobility (PT)  -DJ       User Key  (r) = Recorded By, (t) = Taken By, (c) = Cosigned By    Initials Name Provider Type    Leslie Patel PT Physical Therapist        Physical Therapy Education                 Title: PT OT SLP Therapies (Done)     Topic: Physical Therapy (Done)     Point: Mobility training (Done)     Description:   Instruct learner(s) on safety and technique for assisting patient out of bed, chair or wheelchair.  Instruct in the proper use of assistive devices, such as walker, crutches, cane or brace.              Patient Friendly Description:   It's important to get you on your feet again, but we need to do so in a way that is safe for you. Falling has serious consequences, and your personal safety is the most important thing of all.        When it's time to get out of bed, one of us or a family member will sit next to you on the bed to give you support.     If your doctor or nurse tells you to use a walker, crutches, a cane, or a brace, be sure you use it every time you get out of bed, even if you think you don't need it.    Learning Progress Summary           Patient Acceptance, E,D,  VU,NR by DJ at 6/3/2020 1427    Acceptance, E,TB, VU by KH at 6/2/2020 1202    Acceptance, E, VU by EM at 5/30/2020 1351    Acceptance, E, VU,NR by MS at 5/28/2020 1157    Acceptance, E, VU,NR by MS at 5/26/2020 1524                   Point: Home exercise program (Done)     Description:   Instruct learner(s) on appropriate technique for monitoring, assisting and/or progressing patient with therapeutic exercises and activities.              Learning Progress Summary           Patient Acceptance, E,TB, VU by KH at 6/2/2020 1202                   Point: Body mechanics (Done)     Description:   Instruct learner(s) on proper positioning and spine alignment for patient and/or caregiver during mobility tasks and/or exercises.              Learning Progress Summary           Patient Acceptance, E,D, VU,NR by DJ at 6/3/2020 1427    Acceptance, E,TB, VU by KH at 6/2/2020 1202    Acceptance, E, VU,NR by MS at 5/28/2020 1157    Acceptance, E, VU,NR by MS at 5/26/2020 1524                   Point: Precautions (Done)     Description:   Instruct learner(s) on prescribed precautions during mobility and gait tasks              Learning Progress Summary           Patient Acceptance, E,D, VU,NR by  at 6/3/2020 1427    Acceptance, E,TB, VU by  at 6/2/2020 1202    Acceptance, E, VU,NR by MS at 5/28/2020 1157    Acceptance, E, VU,NR by MS at 5/26/2020 1524                               User Key     Initials Effective Dates Name Provider Type Discipline     02/05/19 -  Jody Burns, PT Physical Therapist PT    EM 04/03/18 -  Mary Jo Hernandez PT Physical Therapist PT    MS 03/04/19 -  Reina Jean-Baptiste, PT Physical Therapist PT    DJ 10/25/19 -  Leslie Rodriges PT Physical Therapist PT              PT Recommendation and Plan     Plan of Care Reviewed With: patient  Progress: no change  Outcome Summary: Pt amb 10-15' to bathroom and then 20' back to recliner chair using cane. Pt c/o fatigue and exhaustion after  attempting to use bathroom. Pt may benefit from trial of r wx due to decreased balance, c/o dizziness, and decreased endurance with activities. Pt has cane at home that he uses with stairs but he may benefit from r wx for amb of any significance Cont PT for ther ex, gt/transfer training, HEP progression, and balance as tolerated.     Time Calculation:   PT Charges     Row Name 06/03/20 1430 06/03/20 1429          Time Calculation    Start Time  --  1330  -DJ     Stop Time  --  1348  -DJ     Time Calculation (min)  --  18 min  -DJ     PT Non-Billable Time (min)  10 min  -DJ  --     PT Received On  06/03/20  -DJ  --     PT - Next Appointment  06/04/20  -DJ  --       User Key  (r) = Recorded By, (t) = Taken By, (c) = Cosigned By    Initials Name Provider Type    Leslie Patel PT Physical Therapist        Therapy Charges for Today     Code Description Service Date Service Provider Modifiers Qty    90053512341 HC PT THER PROC EA 15 MIN 6/3/2020 Leslie Rodriges, PT GP 1    27824255765 HC PT THER SUPP EA 15 MIN 6/3/2020 Leslie Rodriges, PT GP 1          PT G-Codes  Outcome Measure Options: AM-PAC 6 Clicks Basic Mobility (PT)  AM-PAC 6 Clicks Score (PT): 19    Leslie Rodriges PT  6/3/2020

## 2020-06-03 NOTE — PROGRESS NOTES
"   LOS: 8 days    Patient Care Team:  Florencia Caballero MD as PCP - General (Internal Medicine)  Amber Murguia MD as Consulting Physician (Cardiology)  [facility] as Pharmacist  Seth Ladd MD as Surgeon (General Surgery)  Kim Hoyos MD PhD as Consulting Physician (Hematology and Oncology)  Jerome Diallo MD as Referring Physician (Family Medicine)    Chief Complaint:    Chief Complaint   Patient presents with   • Fall   • Weakness - Generalized     Follow UP CKD3  Subjective     Interval History: still severely orthostatic, SBP mid 70s; very fatigued; no dyspnea      Objective     Vital Signs  Temp:  [97.9 °F (36.6 °C)-98.3 °F (36.8 °C)] 97.9 °F (36.6 °C)  Heart Rate:  [64-96] 64  Resp:  [16-18] 16  BP: ()/(49-77) 75/61    Flowsheet Rows      First Filed Value   Admission Height  180.3 cm (71\") Documented at 05/25/2020 1547   Admission Weight  74.8 kg (165 lb) Documented at 05/25/2020 1547          No intake/output data recorded.  I/O last 3 completed shifts:  In: 360 [P.O.:360]  Out: 1475 [Urine:1475]    Intake/Output Summary (Last 24 hours) at 6/3/2020 0704  Last data filed at 6/3/2020 0612  Gross per 24 hour   Intake 240 ml   Output 575 ml   Net -335 ml       Physical Exam:  GEN nad, fatigued  Neck supple no JVD  Lungs CTA bilat no rales  CV RRR no m/g  abd soft NT/ND  vasc no edema, BLE knee high compression stockings        Results Review:    Results from last 7 days   Lab Units 06/03/20  0602 06/02/20  0640 06/01/20  0625   SODIUM mmol/L 138 131* 137   POTASSIUM mmol/L 5.0 4.7 5.2   CHLORIDE mmol/L 108* 104 106   CO2 mmol/L 24.3 23.0 24.0   BUN mg/dL 42* 34* 39*   CREATININE mg/dL 1.56* 1.55* 1.64*   CALCIUM mg/dL 8.4* 8.5* 8.5*   GLUCOSE mg/dL 98 160* 114*       Estimated Creatinine Clearance: 52.6 mL/min (A) (by C-G formula based on SCr of 1.56 mg/dL (H)).    Results from last 7 days   Lab Units 06/03/20  0602 06/02/20  0640 05/31/20  0632   MAGNESIUM mg/dL  --   --  2.0 "   PHOSPHORUS mg/dL 3.5 2.8 3.1             Results from last 7 days   Lab Units 06/03/20  0602 06/02/20  1239 06/02/20  0640 05/30/20  0643 05/29/20  0704 05/28/20  0653   WBC 10*3/mm3 6.84  --  10.58 7.72 7.02 6.90   HEMOGLOBIN g/dL 7.8* 8.4* 8.3* 10.1* 9.7* 9.9*   PLATELETS 10*3/mm3 160  --  168 180 168 185       Results from last 7 days   Lab Units 06/03/20  0602 06/02/20  0640 06/01/20  0625 05/31/20  0632 05/30/20  0643   INR  1.14* 1.15* 1.11* 1.17* 1.24*         Imaging Results (Last 24 Hours)     Procedure Component Value Units Date/Time    NM GI Blood Loss [[id number]] Collected:  06/02/20 1434     Updated:  06/02/20 1438    Narrative:       NM GI BLOOD LOSS-     INDICATIONS: Gastrointestinal bleeding     TECHNIQUE: Bleeding scan     COMPARISON: None available     FINDINGS:     Radiotracer: 30.3 mCi technetium 99m radiolabeled red blood cells,  intravenous. Projections of the abdomen and pelvis were obtained at 2  seconds interval is for 2 minutes, then 2 minute intervals for 60  minutes. Cine sequence was created.        Expected soft tissue localization of radiotracer is noted. No intestinal  hemorrhage is identified at the time of the exam.             Impression:          No intestinal hemorrhage is identified at the time of the exam.  Follow-up/further evaluation can be obtained as indications persist.     This report was finalized on 6/2/2020 2:35 PM by Dr. Drake Dennis M.D.             aspirin 81 mg Oral QAM   atorvastatin 40 mg Oral Nightly   buPROPion  mg Oral Daily   cholecalciferol 1,000 Units Oral Daily   ferrous sulfate 325 mg Oral TID With Meals   folic acid 1 mg Oral Daily   heparin 0.3 mL Other Once in imaging   insulin glargine 15 Units Subcutaneous QAM   insulin lispro 0-7 Units Subcutaneous TID AC   insulin lispro 5 Units Subcutaneous TID With Meals   levothyroxine 75 mcg Oral Q AM   lidocaine 1 patch Transdermal Q24H   metoprolol succinate XL 25 mg Oral Q24H   midodrine 10 mg  Oral TID AC   tamsulosin 0.4 mg Oral Nightly   vitamin B-12 1,000 mcg Oral Daily       Pharmacy to dose warfarin        Medication Review:   Current Facility-Administered Medications   Medication Dose Route Frequency Provider Last Rate Last Dose   • aspirin EC tablet 81 mg  81 mg Oral Jose Enrique Jacobson MD   81 mg at 06/02/20 0728   • atorvastatin (LIPITOR) tablet 40 mg  40 mg Oral Nightly Jose Enrique Louie MD   40 mg at 06/02/20 2110   • buPROPion XL (WELLBUTRIN XL) 24 hr tablet 150 mg  150 mg Oral Daily Jose Enrique Louie MD   150 mg at 06/02/20 0841   • cholecalciferol (VITAMIN D3) tablet 1,000 Units  1,000 Units Oral Daily Jose Enrique Louie MD   1,000 Units at 06/02/20 0841   • dextrose (D50W) 25 g/ 50mL Intravenous Solution 25 g  25 g Intravenous Q15 Min PRN Jose Enrique Louie MD   25 g at 05/29/20 1723   • dextrose (GLUTOSE) oral gel 15 g  15 g Oral Q15 Min PRN Jose Enrique Louie MD       • ferrous sulfate tablet 325 mg  325 mg Oral TID With Meals Jose Enrique Louie MD   325 mg at 06/02/20 1730   • folic acid (FOLVITE) tablet 1 mg  1 mg Oral Daily Jose Enrique Louie MD   1 mg at 06/02/20 0841   • glucagon (human recombinant) (GLUCAGEN DIAGNOSTIC) injection 1 mg  1 mg Subcutaneous Q15 Min PRN Jose Enrique Louie MD   1 mg at 05/29/20 1153   • heparin injection 30 Units  0.3 mL Other Once in imaging Eliseo Gross MD       • insulin glargine (LANTUS) injection 15 Units  15 Units Subcutaneous Jose Enrique Jacobson MD   15 Units at 06/02/20 0842   • insulin lispro (humaLOG) injection 0-7 Units  0-7 Units Subcutaneous TID AC Jose Enrique Louie MD   2 Units at 06/02/20 1730   • insulin lispro (humaLOG) injection 5 Units  5 Units Subcutaneous TID With Meals Eliseo Gross MD   5 Units at 06/02/20 1731   • levothyroxine (SYNTHROID, LEVOTHROID) tablet 75 mcg  75 mcg Oral Q AM Eliseo Gross MD       • lidocaine (LIDODERM) 5 % 1 patch  1 patch Transdermal Q24H  Jose Enrique Louie MD   1 patch at 06/02/20 0841   • lidocaine (XYLOCAINE) 5 % ointment   Topical PRN Jose Enrique Louie MD       • metoprolol succinate XL (TOPROL-XL) 24 hr tablet 25 mg  25 mg Oral Q24H Jose Enrique Louie MD   25 mg at 06/02/20 0841   • midodrine (PROAMATINE) tablet 10 mg  10 mg Oral TID AC Noemy Rea, APRN   10 mg at 06/02/20 1730   • nitroglycerin (NITROSTAT) SL tablet 0.4 mg  0.4 mg Sublingual Q5 Min PRN Jose Enrique Louie MD       • ondansetron (ZOFRAN) injection 4 mg  4 mg Intravenous Q6H PRN Jose Enrique Louie MD       • Pharmacy to dose warfarin   Does not apply Continuous PRN Jose Enrique Louie MD       • sodium chloride 0.9 % flush 10 mL  10 mL Intravenous PRN Jose Enrique Louie MD   10 mL at 06/02/20 2110   • tamsulosin (FLOMAX) 24 hr capsule 0.4 mg  0.4 mg Oral Nightly Jose Enrique Louie MD   0.4 mg at 06/02/20 2109   • vitamin B-12 (CYANOCOBALAMIN) tablet 1,000 mcg  1,000 mcg Oral Daily Jose Enrique Louie MD   1,000 mcg at 06/02/20 0841       Assessment/Plan   1.  SYLVAIN on CKD3: nonoliguric, likely due to hypotension and hypovolemia. Resolved, Cr stable 1.5.  Followed by Dr. Bryan Huddleston in our office.  ACE inhibitor is on hold.  K been low 5's so changed boost to renvela supplement and will add K restriction to diet    2.  Progressive weakness and recent falls; has lost 30 pounds over the last 6 months  3.  Anemia: Heme-positive stools, peptic ulcer disease on EGD; cscope with polyp, resected; hgb down 7.8; tagged RBC scan NEG  4.  PVD with prior strokes on AC  5.  DM2 with early DR; poor control by A1c  6.  HTN with severe orthostatic hypotension.  Intermittent IVF has not helped.  SBP mid 70s with standing this AM.  Midodrine inc'd 10 TID.  Will try to change knee high to thigh compression  -- BP up to 135/75 back in bed   7.  Ischemic cardiomyopathy - compensated w/o diuretic    Plan  - midodrine inc'd 10 TID  - try to get thigh high compression, d/w  RN  - questions for cardiology: can toprol XL be stopped?  And is there any role for pyridostigmine in this situation?   - may need transfusion, defer to Dr Gross       SYLVAIN (acute kidney injury) (CMS/HCC)    Chronic ischemic heart disease    CKD (chronic kidney disease) stage 3, GFR 30-59 ml/min (CMS/HCC)    Acquired hypothyroidism    Recurrent strokes (CMS/HCC)    Chronic combined systolic and diastolic congestive heart failure (CMS/HCC)    YAW on CPAP    Elevated troponin    Anemia, chronic renal failure, stage 3 (moderate) (CMS/HCC)    Essential hypertension    Acute on chronic renal insufficiency    Generalized weakness    Falls frequently    Type 2 diabetes mellitus with hyperglycemia (CMS/HCC)    Acute pain of right shoulder    Lower abdominal pain    Acute on chronic anemia    Heme positive stool    Neurogenic orthostatic hypotension (CMS/HCC)    Anemia    History of colon polyps              Jose Enrique Montelongo MD  06/03/20  07:04

## 2020-06-03 NOTE — PROGRESS NOTES
Continued Stay Note  [facility]     Patient Name: Carlito Mo  MRN: [id number]  Today's Date: 6/3/2020    Admit Date: 5/25/2020    Discharge Plan     Row Name 06/03/20 1632       Plan    Plan  Home with [facility] and rolling walker     Patient/Family in Agreement with Plan  yes    Plan Comments  CCP met with patient at bedside. Discussed d/c planning. Patient confirmed plan is to return home with [facility]. Patient reports he normally uses a cane for mobility. Patient requested CCP talk with his spouse, Lissette Mo. [facility] made outbound call to [facility] [phone]; discussed d/c planning. Patient's spouse confirmed plan is to return home with [facility] and states he will need rolling walker and would like to use [employer]. CCP notified Rhonda/[employer].  Glenys GUERRIER         Discharge Codes    No documentation.             FAREED Bullock

## 2020-06-03 NOTE — PLAN OF CARE
C/O feeling tired. 1 unit of PRBC's transfused.  Orthostatic blood pressures noted. Started on meds for H Pylori. Telemetry SR w/ 1*AVB.  Problem: Patient Care Overview  Goal: Plan of Care Review  Outcome: Ongoing (interventions implemented as appropriate)  Goal: Individualization and Mutuality  Outcome: Ongoing (interventions implemented as appropriate)  Goal: Discharge Needs Assessment  Outcome: Ongoing (interventions implemented as appropriate)  Goal: Interprofessional Rounds/Family Conf  Outcome: Ongoing (interventions implemented as appropriate)     Problem: Patient Care Overview  Goal: Plan of Care Review  Outcome: Ongoing (interventions implemented as appropriate)  Goal: Individualization and Mutuality  Outcome: Ongoing (interventions implemented as appropriate)  Goal: Discharge Needs Assessment  Outcome: Ongoing (interventions implemented as appropriate)  Goal: Interprofessional Rounds/Family Conf  Outcome: Ongoing (interventions implemented as appropriate)     Problem: Fall Risk (Adult)  Goal: Absence of Fall  Outcome: Ongoing (interventions implemented as appropriate)     Problem: Renal Failure/Kidney Injury, Acute (Adult)  Goal: Signs and Symptoms of Listed Potential Problems Will be Absent, Minimized or Managed (Renal Failure/Kidney Injury, Acute)  Outcome: Ongoing (interventions implemented as appropriate)     Problem: Pain, Acute (Adult)  Goal: Acceptable Pain Control/Comfort Level  Outcome: Ongoing (interventions implemented as appropriate)

## 2020-06-03 NOTE — PROGRESS NOTES
"    Patient Name: Carlito Mo  :1955  65 y.o.      Patient Care Team:  Florencia Caballero MD as PCP - General (Internal Medicine)  Amber Murguia MD as Consulting Physician (Cardiology)  [facility] as Pharmacist  Seth Ladd MD as Surgeon (General Surgery)  Kim Hoyos MD PhD as Consulting Physician (Hematology and Oncology)  Jerome Diallo MD as Referring Physician (Family Medicine)    Chief Complaint: follow up chest pain, orthostasis    Interval History: he has not had any more chest pain. Still symptomatic with orthostatic hypotension and it is limiting physical activity.     He feels tired today.        Objective   Vital Signs  Temp:  [97.7 °F (36.5 °C)-97.9 °F (36.6 °C)] 97.7 °F (36.5 °C)  Heart Rate:  [59-96] 59  Resp:  [16-18] 16  BP: ()/(49-78) 135/78    Intake/Output Summary (Last 24 hours) at 6/3/2020 0957  Last data filed at 6/3/2020 0612  Gross per 24 hour   Intake --   Output 575 ml   Net -575 ml     Flowsheet Rows      First Filed Value   Admission Height  180.3 cm (71\") Documented at 2020 1547   Admission Weight  74.8 kg (165 lb) Documented at 2020 1547          Physical Exam:   General Appearance:    Alert, cooperative, in no acute distress   Lungs:     Clear to auscultation.  Normal respiratory effort and rate.      Heart:    Regular rhythm and normal rate, normal S1 and S2, no murmurs, gallops or rubs.     Chest Wall:    No abnormalities observed   Abdomen:     Soft, nontender, positive bowel sounds.     Extremities:   no cyanosis, clubbing or edema.  No marked joint deformities.  Adequate musculoskeletal strength.       Results Review:    Results from last 7 days   Lab Units 20  0602   SODIUM mmol/L 138   POTASSIUM mmol/L 5.0   CHLORIDE mmol/L 108*   CO2 mmol/L 24.3   BUN mg/dL 42*   CREATININE mg/dL 1.56*   GLUCOSE mg/dL 98   CALCIUM mg/dL 8.4*         Results from last 7 days   Lab Units 20  0602   WBC 10*3/mm3 6.84   HEMOGLOBIN " g/dL 7.8*   HEMATOCRIT % 23.8*   PLATELETS 10*3/mm3 160     Results from last 7 days   Lab Units 06/03/20  0602 06/02/20  0640 06/01/20  0625   INR  1.14* 1.15* 1.11*     Results from last 7 days   Lab Units 05/31/20  0632   MAGNESIUM mg/dL 2.0                   Medication Review:     aspirin 81 mg Oral QAM   atorvastatin 40 mg Oral Nightly   buPROPion  mg Oral Daily   cholecalciferol 1,000 Units Oral Daily   ferrous sulfate 325 mg Oral TID With Meals   folic acid 1 mg Oral Daily   heparin 0.3 mL Other Once in imaging   insulin glargine 15 Units Subcutaneous QAM   insulin lispro 0-7 Units Subcutaneous TID AC   insulin lispro 5 Units Subcutaneous TID With Meals   levothyroxine 75 mcg Oral Q AM   lidocaine 1 patch Transdermal Q24H   midodrine 10 mg Oral TID AC   tamsulosin 0.4 mg Oral Nightly   vitamin B-12 1,000 mcg Oral Daily          Pharmacy to dose warfarin        Assessment/Plan   1. Elevated troponin - this is chronic. EKG nonischemic. Stress one year ago with apical infarct, no ischemia, echocardiogram this admission with wall motion abnormality consistent with this.     Given comorbidities, favor medical therapy. We had to stop amlodipine. Nitrate would worsen ortho stasis. Symptoms are pretty stable at this point.      2. SYLVAIN on chronic kidney disease - nephrology is following. Near baseline.      3. Falls/weakness/fatigue/weight loss - still symptomatic from orthostasis.     4. Diabetes mellitus type II - autonomic component to ortho stasis.     5. History of stroke , small PFO. On warfarin which is on hold. Resume today.      6. Obstructive sleep apnea - cpap     7. Chronic combined systolic and diastolic heart failure - compensated without diuretic.  Monitor. He follows with [facility].     8. History of coronary artery disease - remote bypass 2001. On aspirin/statin.      9. Hypertension with orthostatic hypotension - He is normotensive when supine and still seems orthostatic.  Midodrine was added and increased 6/2 Remains orthostatic today.     10. Anemia, positive heme occult stool - EGD with esophagitis. Colonoscopy with one polyp which was resected. No source of bleeding. Outpatient capsule study recommended. Nuclear bleeding scan was negative. Warfarin is still on hold. Hgb down to 7.8 today. May benefit from transfusion given CAD/angina. Did not complain of chest pain today.     Stop metoprolol succinate. EF is now preserved. He may have angina but we will have to see how he does. Discussed with Dr. Badillo/Dr. Montelongo, no role for pyridostigmine in this situation.     JI Mcmullen  [facility]  06/03/20  09:57

## 2020-06-03 NOTE — TELEPHONE ENCOUNTER
----- Message from Amanda Lovelace MD sent at 6/3/2020  1:10 PM EDT -----  H pylori gastritis on his EGD.  Will start treatment while he is hospitalized.    Evidence of esophageal inflammation, non dysplastic Kaur's.

## 2020-06-03 NOTE — PROGRESS NOTES
H pylori gastritis on his EGD.  Will start treatment while he is hospitalized.    Evidence of esophageal inflammation, non dysplastic Kaur's.

## 2020-06-03 NOTE — PROGRESS NOTES
[facility]  Inpatient Progress Note    Reason for Follow Up: Chronic anemia    Subjective     Interval History:   Tagged RBC scan was negative, still slight drop in H&H.  Path negative on polyp removed.    Current Facility-Administered Medications:   •  aspirin EC tablet 81 mg, 81 mg, Oral, Liban MUIR Michael V, MD, 81 mg at 06/03/20 0704  •  atorvastatin (LIPITOR) tablet 40 mg, 40 mg, Oral, Nightly, Jose Enrique Louie MD, 40 mg at 06/02/20 2110  •  bismuth subsalicylate (PEPTO BISMOL) chewable tablet 524 mg, 524 mg, Oral, Q6H, Amanda Lovelace MD  •  buPROPion XL (WELLBUTRIN XL) 24 hr tablet 150 mg, 150 mg, Oral, Daily, Jose Enrique Louie MD, 150 mg at 06/03/20 0919  •  cholecalciferol (VITAMIN D3) tablet 1,000 Units, 1,000 Units, Oral, Daily, Jose Enrique Louie MD, 1,000 Units at 06/03/20 0919  •  dextrose (D50W) 25 g/ 50mL Intravenous Solution 25 g, 25 g, Intravenous, Q15 Min PRN, Jose Enrique Louie MD, 25 g at 05/29/20 1723  •  dextrose (GLUTOSE) oral gel 15 g, 15 g, Oral, Q15 Min PRN, Jose Enrique Louie MD  •  doxycycline (MONODOX) capsule 100 mg, 100 mg, Oral, Q12H, Amanda Lovelace MD  •  ferrous sulfate tablet 325 mg, 325 mg, Oral, TID With Meals, Jose Enrique Louie MD, 325 mg at 06/03/20 1154  •  folic acid (FOLVITE) tablet 1 mg, 1 mg, Oral, Daily, Jose Enrique Louie MD, 1 mg at 06/03/20 0918  •  glucagon (human recombinant) (GLUCAGEN DIAGNOSTIC) injection 1 mg, 1 mg, Subcutaneous, Q15 Min PRN, Jose Enrique Louie MD, 1 mg at 05/29/20 1153  •  heparin injection 30 Units, 0.3 mL, Other, Once in imaging, Eliseo Gross MD  •  insulin glargine (LANTUS) injection 15 Units, 15 Units, Subcutaneous, QALiban MOORE Michael V, MD, 15 Units at 06/03/20 0704  •  insulin lispro (humaLOG) injection 0-7 Units, 0-7 Units, Subcutaneous, TID ACLiban Michael V, MD, 3 Units at 06/03/20 1155  •  insulin lispro (humaLOG) injection 5 Units, 5 Units, Subcutaneous,  TID With Meals, Eliseo Gross MD, 5 Units at 06/03/20 1155  •  levothyroxine (SYNTHROID, LEVOTHROID) tablet 75 mcg, 75 mcg, Oral, Q AM, Eliseo Gross MD, 75 mcg at 06/03/20 0700  •  lidocaine (LIDODERM) 5 % 1 patch, 1 patch, Transdermal, Q24H, Jose Enrique Louie MD, 1 patch at 06/03/20 0919  •  lidocaine (XYLOCAINE) 5 % ointment, , Topical, PRN, Jose Enrique Louie MD  •  metroNIDAZOLE (FLAGYL) tablet 250 mg, 250 mg, Oral, Q6H, Amanda Lovelace MD  •  midodrine (PROAMATINE) tablet 10 mg, 10 mg, Oral, TID AC, Noemy Rea, APRN, 10 mg at 06/03/20 1154  •  nitroglycerin (NITROSTAT) SL tablet 0.4 mg, 0.4 mg, Sublingual, Q5 Min PRN, Jose Enrique Louie MD  •  ondansetron (ZOFRAN) injection 4 mg, 4 mg, Intravenous, Q6H PRN, Jose Enrique Louie MD  •  [START ON 6/4/2020] pantoprazole (PROTONIX) EC tablet 40 mg, 40 mg, Oral, Q AM, Amanda Lovelace MD  •  Pharmacy to dose warfarin, , Does not apply, Continuous PRN, Jose Enrique Louie MD  •  sodium chloride 0.9 % flush 10 mL, 10 mL, Intravenous, PRN, Jose Enrique Louie MD, 10 mL at 06/03/20 0919  •  tamsulosin (FLOMAX) 24 hr capsule 0.4 mg, 0.4 mg, Oral, Nightly, Jose Enrique Louie MD, 0.4 mg at 06/02/20 2109  •  vitamin B-12 (CYANOCOBALAMIN) tablet 1,000 mcg, 1,000 mcg, Oral, Daily, Jose Enrique Louie MD, 1,000 mcg at 06/03/20 0918  Review of Systems:    All systems were reviewed and negative except for:  Gastrointestinal: positive for  See HPI    Objective     Vital Signs  Temp:  [97.7 °F (36.5 °C)-97.9 °F (36.6 °C)] 97.7 °F (36.5 °C)  Heart Rate:  [59-68] 59  Resp:  [16-18] 16  BP: ()/(52-78) 135/78  Body mass index is 24.24 kg/m².    Intake/Output Summary (Last 24 hours) at 6/3/2020 1338  Last data filed at 6/3/2020 0612  Gross per 24 hour   Intake --   Output 575 ml   Net -575 ml     No intake/output data recorded.     Physical Exam:   General: patient awake, alert and cooperative   Eyes: Normal lids and lashes, no scleral  icterus   Neck: supple, normal ROM   Skin: warm and dry, not jaundiced   Cardiovascular: regular rhythm and rate, no murmurs auscultated   Pulm: clear to auscultation bilaterally, regular and unlabored   Abdomen: soft, nontender, nondistended; normal bowel sounds   Extremities: no rash or edema   Psychiatric: Normal mood and behavior; memory intact     Results Review:     I reviewed the patient's new clinical results.    Results from last 7 days   Lab Units 06/03/20  0602 06/02/20  1239 06/02/20  0640 05/30/20  0643   WBC 10*3/mm3 6.84  --  10.58 7.72   HEMOGLOBIN g/dL 7.8* 8.4* 8.3* 10.1*   HEMATOCRIT % 23.8* 25.5* 24.9* 30.6*   PLATELETS 10*3/mm3 160  --  168 180     Results from last 7 days   Lab Units 06/03/20  0602 06/02/20  0640 06/01/20  0625   SODIUM mmol/L 138 131* 137   POTASSIUM mmol/L 5.0 4.7 5.2   CHLORIDE mmol/L 108* 104 106   CO2 mmol/L 24.3 23.0 24.0   BUN mg/dL 42* 34* 39*   CREATININE mg/dL 1.56* 1.55* 1.64*   CALCIUM mg/dL 8.4* 8.5* 8.5*   GLUCOSE mg/dL 98 160* 114*     Results from last 7 days   Lab Units 06/03/20  0602 06/02/20  0640 06/01/20  0625   INR  1.14* 1.15* 1.11*     Lab Results   Lab Value Date/Time    LIPASE 58 11/30/2017 2155    LIPASE 30 08/27/2016 1627    LIPASE 30 02/14/2016 0635       Radiology:  NM GI Blood Loss   Final Result       No intestinal hemorrhage is identified at the time of the exam.   Follow-up/further evaluation can be obtained as indications persist.       This report was finalized on 6/2/2020 2:35 PM by Dr. Drake Dennis M.D.          XR Abdomen KUB   Final Result      CT Head Without Contrast   Final Result   Evidence of moderate small vessel chronic ischemic change as   described. Right maxillary sinus polyp. No acute intracranial   abnormalities were identified.       This report was finalized on 5/26/2020 2:11 AM by Dr. Javad Jolgren,   M.D.          XR Shoulder 2+ View Right   Final Result      XR Spine Thoracic 3 View   Final Result           Assessment/Plan     Patient Active Problem List   Diagnosis   • Major depressive disorder with single episode, in partial remission (CMS/HCC)   • Other hyperlipidemia   • Chronic ischemic heart disease   • Vitamin D deficiency   • Erectile dysfunction   • Chronic fatigue   • Type 2 diabetes mellitus with ophthalmic complication (CMS/HCC)   • Skin lesion of chest wall   • Slow transit constipation   • Benign prostatic hyperplasia with nocturia   • CKD (chronic kidney disease) stage 3, GFR 30-59 ml/min (CMS/HCC)   • History of basal cell carcinoma   • High blood pressure associated with diabetes (CMS/HCC)   • Acquired hypothyroidism   • Recurrent strokes (CMS/HCC)   • Urinary retention   • Pneumonia   • Chronic combined systolic and diastolic congestive heart failure (CMS/HCC)   • Acute respiratory failure with hypoxia (CMS/HCC)   • Suspected sleep apnea   • Pleural effusion   • Chronic combined systolic and diastolic congestive heart failure (CMS/HCC)   • YAW on CPAP   • Coronary artery disease involving native coronary artery of native heart without angina pectoris   • Coronary artery disease   • Acute on chronic congestive heart failure (CMS/HCC)   • Elevated troponin   • Colon cancer screening   • Enlarged lymph nodes   • Functional gait abnormality   • History of myocardial infarction   • Hospital discharge follow-up   • Insomnia   • Iron deficiency anemia   • Major depression, recurrent (CMS/HCC)   • Coronary artery disease involving native coronary artery of native heart without angina pectoris   • Anemia, chronic renal failure, stage 3 (moderate) (CMS/HCC)   • Essential hypertension   • Adverse effect of iron and its compounds, initial encounter   • Acute on chronic renal insufficiency   • Generalized weakness   • Falls frequently   • Type 2 diabetes mellitus with hyperglycemia (CMS/HCC)   • Acute pain of right shoulder   • Lower abdominal pain   • Acute on chronic anemia   • Heme positive stool   •  Neurogenic orthostatic hypotension (CMS/HCC)   • Symptomatic anemia   • History of colon polyps       Assessment:  1. Chronic anemia: Continued drop in hemoglobin  2. Status post EGD and colonoscopy  3. History of polyps      Plan:  · Continue to monitor H&H daily  · Would offer capsule enteroscopy in outpatient setting upon discharge  I discussed the patients findings and my recommendations with patient.    Jose Enrique Louie MD

## 2020-06-04 ENCOUNTER — TELEPHONE (OUTPATIENT)
Dept: GASTROENTEROLOGY | Facility: CLINIC | Age: 65
End: 2020-06-04

## 2020-06-04 VITALS
SYSTOLIC BLOOD PRESSURE: 120 MMHG | HEIGHT: 71 IN | DIASTOLIC BLOOD PRESSURE: 63 MMHG | TEMPERATURE: 97.7 F | BODY MASS INDEX: 24.74 KG/M2 | WEIGHT: 176.7 LBS | HEART RATE: 79 BPM | RESPIRATION RATE: 18 BRPM | OXYGEN SATURATION: 92 %

## 2020-06-04 PROBLEM — B96.81 GASTRITIS DUE TO HELICOBACTER SPECIES: Status: ACTIVE | Noted: 2020-06-04

## 2020-06-04 PROBLEM — K29.70 GASTRITIS DUE TO HELICOBACTER SPECIES: Status: ACTIVE | Noted: 2020-06-04

## 2020-06-04 PROBLEM — R10.30 LOWER ABDOMINAL PAIN: Status: RESOLVED | Noted: 2020-05-27 | Resolved: 2020-06-04

## 2020-06-04 LAB
ALBUMIN SERPL-MCNC: 2.6 G/DL (ref 3.5–5.2)
ANION GAP SERPL CALCULATED.3IONS-SCNC: 7.1 MMOL/L (ref 5–15)
BASOPHILS # BLD AUTO: 0 10*3/MM3 (ref 0–0.2)
BASOPHILS NFR BLD AUTO: 0 % (ref 0–1.5)
BH BB BLOOD EXPIRATION DATE: NORMAL
BH BB BLOOD TYPE BARCODE: 7300
BH BB DISPENSE STATUS: NORMAL
BH BB PRODUCT CODE: NORMAL
BH BB UNIT NUMBER: NORMAL
BUN BLD-MCNC: 40 MG/DL (ref 8–23)
BUN/CREAT SERPL: 25.6 (ref 7–25)
CALCIUM SPEC-SCNC: 8.8 MG/DL (ref 8.6–10.5)
CHLORIDE SERPL-SCNC: 108 MMOL/L (ref 98–107)
CO2 SERPL-SCNC: 22.9 MMOL/L (ref 22–29)
CREAT BLD-MCNC: 1.56 MG/DL (ref 0.76–1.27)
CROSSMATCH INTERPRETATION: NORMAL
DEPRECATED RDW RBC AUTO: 44.7 FL (ref 37–54)
EOSINOPHIL # BLD AUTO: 0.16 10*3/MM3 (ref 0–0.4)
EOSINOPHIL NFR BLD AUTO: 2.5 % (ref 0.3–6.2)
ERYTHROCYTE [DISTWIDTH] IN BLOOD BY AUTOMATED COUNT: 14.6 % (ref 12.3–15.4)
GFR SERPL CREATININE-BSD FRML MDRD: 45 ML/MIN/1.73
GLUCOSE BLD-MCNC: 129 MG/DL (ref 65–99)
GLUCOSE BLDC GLUCOMTR-MCNC: 112 MG/DL (ref 70–130)
GLUCOSE BLDC GLUCOMTR-MCNC: 168 MG/DL (ref 70–130)
HCT VFR BLD AUTO: 24.9 % (ref 37.5–51)
HGB BLD-MCNC: 8.2 G/DL (ref 13–17.7)
IMM GRANULOCYTES # BLD AUTO: 0.04 10*3/MM3 (ref 0–0.05)
IMM GRANULOCYTES NFR BLD AUTO: 0.6 % (ref 0–0.5)
INR PPP: 1.07 (ref 0.9–1.1)
LYMPHOCYTES # BLD AUTO: 0.98 10*3/MM3 (ref 0.7–3.1)
LYMPHOCYTES NFR BLD AUTO: 15.5 % (ref 19.6–45.3)
MCH RBC QN AUTO: 27.7 PG (ref 26.6–33)
MCHC RBC AUTO-ENTMCNC: 32.9 G/DL (ref 31.5–35.7)
MCV RBC AUTO: 84.1 FL (ref 79–97)
MONOCYTES # BLD AUTO: 0.64 10*3/MM3 (ref 0.1–0.9)
MONOCYTES NFR BLD AUTO: 10.1 % (ref 5–12)
NEUTROPHILS # BLD AUTO: 4.52 10*3/MM3 (ref 1.7–7)
NEUTROPHILS NFR BLD AUTO: 71.3 % (ref 42.7–76)
NRBC BLD AUTO-RTO: 0 /100 WBC (ref 0–0.2)
PHOSPHATE SERPL-MCNC: 3.4 MG/DL (ref 2.5–4.5)
PLATELET # BLD AUTO: 174 10*3/MM3 (ref 140–450)
PMV BLD AUTO: 10.2 FL (ref 6–12)
POTASSIUM BLD-SCNC: 4.7 MMOL/L (ref 3.5–5.2)
PROTHROMBIN TIME: 13.6 SECONDS (ref 11.7–14.2)
RBC # BLD AUTO: 2.96 10*6/MM3 (ref 4.14–5.8)
SODIUM BLD-SCNC: 138 MMOL/L (ref 136–145)
UNIT  ABO: NORMAL
UNIT  RH: NORMAL
WBC NRBC COR # BLD: 6.34 10*3/MM3 (ref 3.4–10.8)

## 2020-06-04 PROCEDURE — 80069 RENAL FUNCTION PANEL: CPT | Performed by: INTERNAL MEDICINE

## 2020-06-04 PROCEDURE — 63710000001 INSULIN LISPRO (HUMAN) PER 5 UNITS: Performed by: INTERNAL MEDICINE

## 2020-06-04 PROCEDURE — 99232 SBSQ HOSP IP/OBS MODERATE 35: CPT | Performed by: NURSE PRACTITIONER

## 2020-06-04 PROCEDURE — 85610 PROTHROMBIN TIME: CPT | Performed by: INTERNAL MEDICINE

## 2020-06-04 PROCEDURE — 82962 GLUCOSE BLOOD TEST: CPT

## 2020-06-04 PROCEDURE — 99232 SBSQ HOSP IP/OBS MODERATE 35: CPT | Performed by: INTERNAL MEDICINE

## 2020-06-04 PROCEDURE — 85025 COMPLETE CBC W/AUTO DIFF WBC: CPT | Performed by: INTERNAL MEDICINE

## 2020-06-04 PROCEDURE — 63710000001 INSULIN GLARGINE PER 5 UNITS: Performed by: INTERNAL MEDICINE

## 2020-06-04 RX ORDER — BISMUTH SUBSALICYLATE 262 MG/1
524 TABLET, CHEWABLE ORAL EVERY 6 HOURS
Qty: 72 TABLET | Refills: 0 | Status: SHIPPED | OUTPATIENT
Start: 2020-06-04 | End: 2020-06-13

## 2020-06-04 RX ORDER — PANTOPRAZOLE SODIUM 40 MG/1
40 TABLET, DELAYED RELEASE ORAL DAILY
Qty: 30 TABLET | Refills: 0 | Status: ON HOLD | OUTPATIENT
Start: 2020-06-04 | End: 2021-05-21

## 2020-06-04 RX ORDER — DOXYCYCLINE 100 MG/1
100 CAPSULE ORAL EVERY 12 HOURS SCHEDULED
Qty: 17 CAPSULE | Refills: 0 | Status: SHIPPED | OUTPATIENT
Start: 2020-06-04 | End: 2020-06-13

## 2020-06-04 RX ORDER — MIDODRINE HYDROCHLORIDE 10 MG/1
10 TABLET ORAL
Qty: 30 TABLET | Refills: 0 | Status: SHIPPED | OUTPATIENT
Start: 2020-06-04 | End: 2020-07-22

## 2020-06-04 RX ORDER — METRONIDAZOLE 250 MG/1
250 TABLET ORAL EVERY 6 HOURS SCHEDULED
Qty: 37 TABLET | Refills: 0 | Status: SHIPPED | OUTPATIENT
Start: 2020-06-04 | End: 2020-06-14

## 2020-06-04 RX ORDER — LIDOCAINE 50 MG/G
1 PATCH TOPICAL
Qty: 15 EACH | Refills: 0 | Status: SHIPPED | OUTPATIENT
Start: 2020-06-05 | End: 2020-07-22

## 2020-06-04 RX ADMIN — INSULIN GLARGINE 15 UNITS: 100 INJECTION, SOLUTION SUBCUTANEOUS at 08:19

## 2020-06-04 RX ADMIN — SODIUM CHLORIDE, PRESERVATIVE FREE 10 ML: 5 INJECTION INTRAVENOUS at 08:20

## 2020-06-04 RX ADMIN — FERROUS SULFATE TAB 325 MG (65 MG ELEMENTAL FE) 325 MG: 325 (65 FE) TAB at 12:19

## 2020-06-04 RX ADMIN — MIDODRINE HYDROCHLORIDE 10 MG: 5 TABLET ORAL at 06:53

## 2020-06-04 RX ADMIN — FOLIC ACID 1 MG: 1 TABLET ORAL at 08:19

## 2020-06-04 RX ADMIN — LEVOTHYROXINE SODIUM 75 MCG: 75 TABLET ORAL at 05:49

## 2020-06-04 RX ADMIN — LIDOCAINE 1 PATCH: 50 PATCH TOPICAL at 08:19

## 2020-06-04 RX ADMIN — VITAMIN D, TAB 1000IU (100/BT) 1000 UNITS: 25 TAB at 08:19

## 2020-06-04 RX ADMIN — INSULIN LISPRO 2 UNITS: 100 INJECTION, SOLUTION INTRAVENOUS; SUBCUTANEOUS at 08:33

## 2020-06-04 RX ADMIN — INSULIN LISPRO 5 UNITS: 100 INJECTION, SOLUTION INTRAVENOUS; SUBCUTANEOUS at 12:19

## 2020-06-04 RX ADMIN — METRONIDAZOLE 250 MG: 250 TABLET ORAL at 12:19

## 2020-06-04 RX ADMIN — Medication 1000 MCG: at 08:19

## 2020-06-04 RX ADMIN — Medication 524 MG: at 13:57

## 2020-06-04 RX ADMIN — BUPROPION HYDROCHLORIDE 150 MG: 150 TABLET, FILM COATED, EXTENDED RELEASE ORAL at 08:19

## 2020-06-04 RX ADMIN — Medication 524 MG: at 08:19

## 2020-06-04 RX ADMIN — MIDODRINE HYDROCHLORIDE 10 MG: 5 TABLET ORAL at 12:19

## 2020-06-04 RX ADMIN — PANTOPRAZOLE SODIUM 40 MG: 40 TABLET, DELAYED RELEASE ORAL at 06:53

## 2020-06-04 RX ADMIN — FERROUS SULFATE TAB 325 MG (65 MG ELEMENTAL FE) 325 MG: 325 (65 FE) TAB at 08:19

## 2020-06-04 RX ADMIN — METRONIDAZOLE 250 MG: 250 TABLET ORAL at 05:49

## 2020-06-04 RX ADMIN — Medication 524 MG: at 02:39

## 2020-06-04 RX ADMIN — DOXYCYCLINE 100 MG: 100 CAPSULE ORAL at 08:19

## 2020-06-04 RX ADMIN — ASPIRIN 81 MG: 81 TABLET, COATED ORAL at 06:53

## 2020-06-04 RX ADMIN — INSULIN LISPRO 5 UNITS: 100 INJECTION, SOLUTION INTRAVENOUS; SUBCUTANEOUS at 08:19

## 2020-06-04 NOTE — PLAN OF CARE
Pt is alert and oriented.  He is discharging this afternoon.  Not able to get through to his PCP to make a 1 week follow up.  Instructions in discharge paperwork.  Bonnie will be delivering his jobst stockings.  Will monitor patient until he is discharged.  CASSIE Salazar RN

## 2020-06-04 NOTE — TELEPHONE ENCOUNTER
----- Message from Helen Kraus sent at 6/4/2020  3:02 PM EDT -----  Regarding: FW: Schedule outpatient capsule  Please place order for Capsule per Dr. Blancas    ----- Message -----  From: Yesenia Blancas MD  Sent: 6/4/2020   2:54 PM EDT  To: Marely Bello  Subject: Schedule outpatient capsule                      Patient needs patient capsule study due to melena, iron deficiency anemia

## 2020-06-04 NOTE — DISCHARGE SUMMARY
Date of Admission: 5/25/2020  Date of Discharge:  6/4/2020  Primary Care Physician: Florencia Caballero MD     Discharge Diagnosis:  Active Hospital Problems    Diagnosis  POA   • Helicobacter pylori gastritis [K29.70, B96.81]  Yes   • Neurogenic orthostatic hypotension (CMS/HCC) [G90.3]  Yes   • Acute pain of right shoulder [M25.511]  Yes   • Acute on chronic renal insufficiency [N28.9, N18.9]  Yes   • Generalized weakness [R53.1]  Yes   • Falls frequently [R29.6]  Not Applicable   • Type 2 diabetes mellitus with hyperglycemia (CMS/HCC) [E11.65]  Yes   • Acute on chronic anemia [D64.9]  Yes   • Heme positive stool [R19.5]  Yes   • Symptomatic anemia [D64.9]  Yes   • History of colon polyps [Z86.010]  Yes   • Essential hypertension [I10]  Yes   • Anemia, chronic renal failure, stage 3 (moderate) (CMS/HCC) [N18.3, D63.1]  Yes   • Elevated troponin [R79.89]  Yes   • YAW on CPAP [G47.33, Z99.89]  Not Applicable   • Chronic combined systolic and diastolic congestive heart failure (CMS/HCC) [I50.42]  Yes   • Recurrent strokes (CMS/HCC) [I63.9]  Yes   • Acquired hypothyroidism [E03.9]  Yes   • CKD (chronic kidney disease) stage 3, GFR 30-59 ml/min (CMS/HCC) [N18.3]  Yes   • Chronic ischemic heart disease [I25.9]  Yes      Resolved Hospital Problems    Diagnosis Date Resolved POA   • **SYLVAIN (acute kidney injury) (CMS/HCC) [N17.9] 06/03/2020 Yes   • Lower abdominal pain [R10.30] 06/04/2020 Yes       Presenting Problem/History of Present Illness:  Lightheadedness [R42]  Elevated troponin [R79.89]  Acute on chronic renal insufficiency [N28.9, N18.9]  Generalized weakness [R53.1]  Upper back strain, initial encounter [S29.012A]  Contusion of right shoulder, initial encounter [S40.011A]  Acute on chronic renal insufficiency [N28.9, N18.9]     Hospital Course:  The patient is a 65 y.o. male with a history of ischemic heart disease, chronic combined systolic and diastolic congestive heart failure, type 2 diabetes mellitus, atrial  fibrillation on warfarin, and CVA who presented with progressive generalized weakness and falls. Please see admission H&P from 5/25/20 for further details.  He was found to have an acute kidney injury and anemia. He did have a small amount of IV fluid resuscitation after which his SYLVAIN resolved. GI was consulted for anemia. He had no gross bleeding initially but hemoccult was positive.  Gastroenterology was consulted. He had an ECG which showed non-bleeding gastritis, and biopsy was positive for H. Pylori for which he will be treated with a course of antibiotics. He had a colonoscopy which showed some old blood and a polyp in the ascending colon which was removed-pathology is pending on this.  He will need to follow up with gastroenterology for these issues and a capsule endoscopy will be considered as an outpatient. His hemoglobin was stable and there were no signs of bleeding at the time of discharge. He will be continued on his aspirin and his warfarin will be held for the time being. He should keep close outpatient follow up with his outpatient physicians to determine when he can restart his coumadin-optimally would want to insure his hemoglobin is at least stable.   The patient had rather pronounced orthostatic hypotension.  His antihypertensives were discontinued and his volume status corrected. This issue still remained, dropping to the 70s systolic on standing.  He was started on midodrine and transfused a unit of PRBCs which did help-he was still orthostatic at the time of discharge but he is only dropping to the mid-90s systolic now and his symptoms are much better. I have ordered thigh-high compression stockings which his family will  as an outpatient.  I strongly suspect that he has a component of autonomic neuropathy from his diabetes.    The patient is feeling much better and will be discharged home. He will need to keep follow up with his outpatient physicians as below.    Exam Today:  Blood  "pressure 120/63, pulse 79, temperature 97.7 °F (36.5 °C), temperature source Oral, resp. rate 18, height 180.3 cm (71\"), weight 80.2 kg (176 lb 11.2 oz), SpO2 92 %.  Constitutional: He is oriented to person, place, and time. He has a sickly appearance. No distress.   Head: Normocephalic and atraumatic.   Mouth/Throat: Oropharynx is clear and moist.   Eyes: Pupils are equal, round, and reactive to light. Conjunctivae and EOM are normal.   Neck: Neck supple. No JVD present.   Cardiovascular: Normal rate, normal heart sounds and intact distal pulses.   Pulmonary/Chest: Effort normal and breath sounds normal. No respiratory distress.   Abdominal: Soft. Bowel sounds are normal. He exhibits no distension. There is no tenderness.   Musculoskeletal: He exhibits no edema or deformity.   Neurological: He is alert and oriented to person, place, and time.   Skin: Skin is warm and dry. He is not diaphoretic.   Psychiatric: He has a normal mood and affect. His behavior is normal.   Nursing note and vitals reviewed.    Procedures Performed:  Procedure(s):  COLONOSCOPY            Consults:   Consults     Date and Time Order Name Status Description    5/27/2020 1225 Inpatient Gastroenterology Consult Completed     5/26/2020 1348 Inpatient Cardiology Consult Completed     5/25/2020 1843 Inpatient Nephrology Consult Completed     5/25/2020 1724 [facility] (on-call MD unless specified) Details Completed            Discharge Disposition:  Home or Self Care    Discharge Medications:     Discharge Medications      New Medications      Instructions Start Date   bismuth subsalicylate 262 MG chewable tablet  Commonly known as:  PEPTO BISMOL   524 mg, Oral, Every 6 Hours      doxycycline 100 MG capsule  Commonly known as:  MONODOX   100 mg, Oral, Every 12 Hours Scheduled      lidocaine 5 %  Commonly known as:  LIDODERM   1 patch, Transdermal, Every 24 Hours Scheduled, Remove & Discard patch within 12 hours or as directed by MD   Start Date:  June 5, " 2020     metroNIDAZOLE 250 MG tablet  Commonly known as:  FLAGYL   250 mg, Oral, Every 6 Hours Scheduled      midodrine 10 MG tablet  Commonly known as:  PROAMATINE   10 mg, Oral, 3 Times Daily Before Meals      pantoprazole 40 MG EC tablet  Commonly known as:  PROTONIX   40 mg, Oral, Daily         Continue These Medications      Instructions Start Date   Apidra 100 UNIT/ML injection  Generic drug:  insulin glulisine   2 Units, Subcutaneous, 3 Times Daily Before Meals, Patient says he does not take consistently      aspirin 81 MG EC tablet   81 mg, Oral, Every Morning      atorvastatin 40 MG tablet  Commonly known as:  LIPITOR   40 mg, Oral, Nightly      buPROPion  MG 24 hr tablet  Commonly known as:  WELLBUTRIN XL   150 mg, Oral, Daily      ferrous sulfate 325 (65 FE) MG tablet   325 mg, Oral, 3 Times Daily With Meals      folic acid 1 MG tablet  Commonly known as:  FOLVITE   1 mg, Oral, Daily      Insulin Glargine 100 UNIT/ML injection pen  Commonly known as:  BASAGLAR KWIKPEN   8 Units, Subcutaneous, Nightly, Patient says he does not take consistently      levothyroxine 75 MCG tablet  Commonly known as:  SYNTHROID, LEVOTHROID   75 mcg, Oral, Daily      tamsulosin 0.4 MG capsule 24 hr capsule  Commonly known as:  FLOMAX   1 capsule, Oral, Nightly      vitamin B-12 1000 MCG tablet  Commonly known as:  CYANOCOBALAMIN   1,000 mcg, Oral, Daily      vitamin D3 125 MCG (5000 UT) capsule capsule   5,000 Units, Oral, Daily         Stop These Medications    amLODIPine 5 MG tablet  Commonly known as:  NORVASC     carvedilol 6.25 MG tablet  Commonly known as:  COREG     furosemide 40 MG tablet  Commonly known as:  LASIX     lisinopril 40 MG tablet  Commonly known as:  PRINIVIL,ZESTRIL     metoprolol succinate XL 25 MG 24 hr tablet  Commonly known as:  TOPROL-XL     warfarin 2 MG tablet  Commonly known as:  COUMADIN            Discharge Diet:   Diet Instructions     Diet: Consistent Carbohydrate, Regular, Specialty  Diet; Thin Liquids, No Restrictions; Low Potassium      Discharge Diet:   Consistent Carbohydrate  Regular  Specialty Diet       Fluid Consistency:  Thin Liquids, No Restrictions    Specialty Diets:  Low Potassium          Activity at Discharge:   Activity Instructions     Activity as Tolerated            Follow-up Appointments:  Future Appointments   Date Time Provider Department Center   6/11/2020  3:00 PM Candy Dale APRN [facility] None   7/14/2020 12:00 PM LAB CHAIR 4 [facility]  LAB [facility]   7/14/2020 12:40 PM Kim Hoyos MD PhD [facility]   7/23/2020  2:00 PM Albert Wing MD [facility] None   10/5/2020  2:20 PM Amber Murguia MD [facility] None     Additional Instructions for the Follow-ups that You Need to Schedule     Discharge Follow-up with PCP   As directed       Currently Documented PCP:    Florencia Caballero MD    PCP Phone Number:    [phone]     Follow Up Details:  1 week         Discharge Follow-up with Specialty: Gastroenterology   As directed      Specialty:  Gastroenterology    Follow Up Details:  2 weeks or as scheduled         Discharge Follow-up with Specialty: cardiology   As directed      Specialty:  cardiology    Follow Up Details:  1 week or as scheduled         Discharge Follow-up with Specified Provider: Dr. Montelongo (Nephrology); 2 Weeks   As directed      To:  Dr. Montelongo (Nephrology)    Follow Up:  2 Weeks         CBC & Differential    Jun 09, 2020 (Approximate)      Manual Differential:  No                  Eliseo Gross MD  06/04/20  14:37    Time Spent on Discharge Activities: Greater than 30 minutes.

## 2020-06-04 NOTE — PROGRESS NOTES
"[facility]    Patient Name: Carlito oM  :1955  65 y.o.  LOS: 9  Encounter Provider: JI Guthrie      Patient Care Team:  Florencia Caballero MD as PCP - General (Internal Medicine)  Amber Murguia MD as Consulting Physician (Cardiology)  [facility] as Pharmacist  Seth Ladd MD as Surgeon (General Surgery)  Kim Hoyos MD PhD as Consulting Physician (Hematology and Oncology)  Jerome Diallo MD as Referring Physician (Family Medicine)    Chief Complaint: Follow-up elevated troponin, CHF, CAD, orthostatic hypotension    Interval History: Patient reports decreased episodes of lightheadedness today.  He reports that since stopping beta-blocker therapy he has had one episode of chest pain this morning that occurred while eating breakfast.  Denies any subsequent episodes.  Currently denies chest pain or shortness of breath.       Objective   Vital Signs  Temp:  [97.6 °F (36.4 °C)-98.2 °F (36.8 °C)] 97.8 °F (36.6 °C)  Heart Rate:  [] 79  Resp:  [16-20] 18  BP: ()/(59-83) 106/61    Intake/Output Summary (Last 24 hours) at 2020 1236  Last data filed at 2020 1057  Gross per 24 hour   Intake 1095 ml   Output 1550 ml   Net -455 ml     Flowsheet Rows      First Filed Value   Admission Height  180.3 cm (71\") Documented at 2020 1547   Admission Weight  74.8 kg (165 lb) Documented at 2020 1547            Physical Exam   Constitutional: He is oriented to person, place, and time. Vital signs are normal. He appears well-developed and well-nourished.   Eyes: Conjunctivae are normal.   Neck: Carotid bruit is not present.   Cardiovascular: Normal rate, regular rhythm and normal heart sounds.   No murmur heard.  Pulmonary/Chest: Effort normal and breath sounds normal.   Abdominal: Normal appearance.   Musculoskeletal: Normal range of motion.   No pedal edema   Neurological: He is alert and oriented to person, place, and time. GCS eye " subscore is 4. GCS verbal subscore is 5. GCS motor subscore is 6.   Skin: Skin is warm and dry.   Psychiatric: He has a normal mood and affect. His speech is normal and behavior is normal. Judgment and thought content normal. Cognition and memory are normal.         Pertinent Test Results:  Results from last 7 days   Lab Units 06/04/20  0712 06/03/20  0602 06/02/20  0640 06/01/20  0625 05/31/20  0632 05/30/20  0643 05/29/20  0704   SODIUM mmol/L 138 138 131* 137 136 140 136   POTASSIUM mmol/L 4.7 5.0 4.7 5.2 5.1 4.9 4.5   CHLORIDE mmol/L 108* 108* 104 106 108* 107 107   CO2 mmol/L 22.9 24.3 23.0 24.0 23.0 25.3 23.1   BUN mg/dL 40* 42* 34* 39* 41* 33* 37*   CREATININE mg/dL 1.56* 1.56* 1.55* 1.64* 1.78* 1.61* 1.70*   GLUCOSE mg/dL 129* 98 160* 114* 109* 232* 131*   CALCIUM mg/dL 8.8 8.4* 8.5* 8.5* 8.2* 8.6 8.3*         Results from last 7 days   Lab Units 06/04/20  0712 06/03/20  0602 06/02/20  1239 06/02/20  0640 05/30/20  0643 05/29/20  0704   WBC 10*3/mm3 6.34 6.84  --  10.58 7.72 7.02   HEMOGLOBIN g/dL 8.2* 7.8* 8.4* 8.3* 10.1* 9.7*   HEMATOCRIT % 24.9* 23.8* 25.5* 24.9* 30.6* 29.4*   PLATELETS 10*3/mm3 174 160  --  168 180 168     Results from last 7 days   Lab Units 06/04/20  0712 06/03/20  0602 06/02/20  0640 06/01/20  0625 05/31/20  0632 05/30/20  0643 05/29/20  0704   INR  1.07 1.14* 1.15* 1.11* 1.17* 1.24* 1.50*     Results from last 7 days   Lab Units 05/31/20  0632   MAGNESIUM mg/dL 2.0           Invalid input(s): LDLCALC                Medication Review:     aspirin 81 mg Oral QAM   atorvastatin 40 mg Oral Nightly   bismuth subsalicylate 524 mg Oral Q6H   buPROPion  mg Oral Daily   cholecalciferol 1,000 Units Oral Daily   doxycycline 100 mg Oral Q12H   ferrous sulfate 325 mg Oral TID With Meals   folic acid 1 mg Oral Daily   heparin 0.3 mL Other Once in imaging   insulin glargine 15 Units Subcutaneous QAM   insulin lispro 0-7 Units Subcutaneous TID AC   insulin lispro 5 Units Subcutaneous TID With  Meals   levothyroxine 75 mcg Oral Q AM   lidocaine 1 patch Transdermal Q24H   metroNIDAZOLE 250 mg Oral Q6H   midodrine 10 mg Oral TID AC   pantoprazole 40 mg Oral Q AM   tamsulosin 0.4 mg Oral Nightly   vitamin B-12 1,000 mcg Oral Daily          Pharmacy to dose warfarin        Assessment/Plan   1. Elevated troponin: This is chronic.  No ischemic changes noted on ECG.  Stress 1 year ago with apical infarct, no ischemia.  Echocardiogram this admission with wall motion abnormality consistent with this.  Given patient's multiple comorbidities favor medical therapy.  Amlodipine stopped.  Add nitrates would worsen orthostasis.  Symptoms stable thus far.  2. SYLVAIN on CKD: Nephrology following.  Creatinine near baseline.  3. Frequent falls with generalized weakness, fatigue, weight loss: Symptomatic from orthostasis  4. Type 2 diabetes  5. History of stroke: Small PFO.  Pharmacy dosing Coumadin.  6. YAW on CPAP  7. Chronic combined heart failure: Compensated without diuretic.  Continue to monitor.  Patient follows [place] [facility]  8. History of CAD with CABG 2001: On aspirin and statin therapy  9. HTN with orthostatic hypotension.  Beta-blocker stopped yesterday.  Midodrin was added to his regimen and increased on 6/2.  Patient still orthostatic this morning.  10. Anemia with positive Hemoccult stool: EGD with esophagitis.  Colonoscopy with 1 polyp which was resected.  No source of bleeding.  Outpatient capsule study recommended.  Nuclear bleeding scan negative.  Pharmacy now dosing Coumadin, will monitor for bleeding closely.  Hemoglobin 8.2 today.    -No episodes of angina since stopping beta-blocker therapy.    -Orthostasis improved with increased doses of Midodrin.    -Okay to discharge from cardiovascular standpoint.  Follow-up JI Burt in 1 week.  We will discuss resumption of warfarin therapy at the follow-up appointment.  Discussed with [facility] and would prefer to hold and ensure patient's  hemoglobin is stable in 1 week.  Repeat CBC ordered for 1 week.    JI Guthrie  [facility]  06/04/20  12:36 PM

## 2020-06-04 NOTE — NURSING NOTE
Spoke with [facility].  Measurements for stockings given and they will be delivered to the hospital.  CASSIE Salazar RN

## 2020-06-04 NOTE — TELEPHONE ENCOUNTER
----- Message from Yesenia Blancas MD sent at 6/4/2020  2:54 PM EDT -----  Regarding: Schedule outpatient capsule  Patient needs patient capsule study due to melena, iron deficiency anemia

## 2020-06-04 NOTE — PROGRESS NOTES
Continued Stay Note  [facility]     Patient Name: Carlito Mo  MRN: [id number]  Today's Date: 6/4/2020    Admit Date: 5/25/2020    Discharge Plan     Row Name 06/04/20 1352       Plan    Plan  Home with wife and [facility]    Plan Comments  Met with patient at bedside, confirmed discharge plan is to return home with family support and [facility].  Left message for Jovana, regarding patient discharge this afternoon.  Per patient, wife will transport.         Discharge Codes    No documentation.       Expected Discharge Date and Time     Expected Discharge Date Expected Discharge Time    Jun 4, 2020             Mary Ellen Villela RN

## 2020-06-04 NOTE — PROGRESS NOTES
[facility]  Inpatient Progress Note    Reason for Follow Up:  Chronic anemia    Subjective     Interval History:   Denies abdominal pain or nausea.  Tolerating antibiotics.    Current Facility-Administered Medications:   •  aspirin EC tablet 81 mg, 81 mg, Oral, QAM, Jose Enrique Louie MD, 81 mg at 06/04/20 0653  •  atorvastatin (LIPITOR) tablet 40 mg, 40 mg, Oral, Nightly, Jose Enrique Louie MD, 40 mg at 06/03/20 2015  •  bismuth subsalicylate (PEPTO BISMOL) chewable tablet 524 mg, 524 mg, Oral, Q6H, Amanda Lovelace MD, 524 mg at 06/04/20 1357  •  buPROPion XL (WELLBUTRIN XL) 24 hr tablet 150 mg, 150 mg, Oral, Daily, Jose Enrique Louie MD, 150 mg at 06/04/20 0819  •  cholecalciferol (VITAMIN D3) tablet 1,000 Units, 1,000 Units, Oral, Daily, Jose Enrique Louie MD, 1,000 Units at 06/04/20 0819  •  dextrose (D50W) 25 g/ 50mL Intravenous Solution 25 g, 25 g, Intravenous, Q15 Min PRN, Jose Enrique Louie MD, 25 g at 05/29/20 1723  •  dextrose (GLUTOSE) oral gel 15 g, 15 g, Oral, Q15 Min PRN, Jose Enrique Louie MD  •  doxycycline (MONODOX) capsule 100 mg, 100 mg, Oral, Q12H, Amanda Lovelace MD, 100 mg at 06/04/20 0819  •  ferrous sulfate tablet 325 mg, 325 mg, Oral, TID With Meals, Jose Enrique Louie MD, 325 mg at 06/04/20 1219  •  folic acid (FOLVITE) tablet 1 mg, 1 mg, Oral, Daily, Jose Enrique Louie MD, 1 mg at 06/04/20 0819  •  glucagon (human recombinant) (GLUCAGEN DIAGNOSTIC) injection 1 mg, 1 mg, Subcutaneous, Q15 Min PRN, Jose Enrique Louie MD, 1 mg at 05/29/20 1153  •  heparin injection 30 Units, 0.3 mL, Other, Once in imaging, Eliseo Gross MD  •  insulin glargine (LANTUS) injection 15 Units, 15 Units, Subcutaneous, QAMLiban Michael V, MD, 15 Units at 06/04/20 0819  •  insulin lispro (humaLOG) injection 0-7 Units, 0-7 Units, Subcutaneous, TID ACLiban Michael V, MD, 2 Units at 06/04/20 0833  •  insulin lispro (humaLOG) injection 5 Units, 5  Units, Subcutaneous, TID With Meals, Eliseo Gross MD, 5 Units at 06/04/20 1219  •  levothyroxine (SYNTHROID, LEVOTHROID) tablet 75 mcg, 75 mcg, Oral, Q AM, Eliseo Gross MD, 75 mcg at 06/04/20 0549  •  lidocaine (LIDODERM) 5 % 1 patch, 1 patch, Transdermal, Q24H, Jose Enrique Louie MD, 1 patch at 06/04/20 0819  •  lidocaine (XYLOCAINE) 5 % ointment, , Topical, PRN, Jose Enrique Louie MD  •  metroNIDAZOLE (FLAGYL) tablet 250 mg, 250 mg, Oral, Q6H, Amanda Lovelace MD, 250 mg at 06/04/20 1219  •  midodrine (PROAMATINE) tablet 10 mg, 10 mg, Oral, TID AC, Noemy Rea, APRN, 10 mg at 06/04/20 1219  •  nitroglycerin (NITROSTAT) SL tablet 0.4 mg, 0.4 mg, Sublingual, Q5 Min PRN, Jose Enrique Louie MD  •  ondansetron (ZOFRAN) injection 4 mg, 4 mg, Intravenous, Q6H PRN, Jose Enrique Louie MD  •  pantoprazole (PROTONIX) EC tablet 40 mg, 40 mg, Oral, Q AM, Amanda Lovelace MD, 40 mg at 06/04/20 0653  •  Pharmacy to dose warfarin, , Does not apply, Continuous PRN, Jose Enrique Louie MD  •  sodium chloride 0.9 % flush 10 mL, 10 mL, Intravenous, PRN, Jose Enrique Louie MD, 10 mL at 06/04/20 0820  •  tamsulosin (FLOMAX) 24 hr capsule 0.4 mg, 0.4 mg, Oral, Nightly, Jose Enrique Louie MD, 0.4 mg at 06/03/20 2015  •  vitamin B-12 (CYANOCOBALAMIN) tablet 1,000 mcg, 1,000 mcg, Oral, Daily, Jose Enrique Louie MD, 1,000 mcg at 06/04/20 0819  Review of Systems:   Negative for fevers or chills, negative for shortness of air or cough    Objective     Vital Signs  Temp:  [97.7 °F (36.5 °C)-98.2 °F (36.8 °C)] 97.7 °F (36.5 °C)  Heart Rate:  [] 79  Resp:  [16-20] 18  BP: ()/(59-83) 120/63  Body mass index is 24.64 kg/m².    Intake/Output Summary (Last 24 hours) at 6/4/2020 1430  Last data filed at 6/4/2020 1356  Gross per 24 hour   Intake 735 ml   Output 1550 ml   Net -815 ml     I/O this shift:  In: 240 [P.O.:240]  Out: 725 [Urine:725]     Physical Exam:   General: patient awake, alert and  cooperative   Eyes: Normal lids and lashes, no scleral icterus   Neck: supple, normal ROM   Skin: warm and dry, not jaundiced   Pulm:  regular and unlabored   Abdomen: soft, nontender, nondistended   Extremities: no rash or edema   Psychiatric: Normal mood and behavior; memory intact     Results Review:     I reviewed the patient's new clinical results.    Results from last 7 days   Lab Units 06/04/20  0712 06/03/20  0602 06/02/20  1239 06/02/20  0640   WBC 10*3/mm3 6.34 6.84  --  10.58   HEMOGLOBIN g/dL 8.2* 7.8* 8.4* 8.3*   HEMATOCRIT % 24.9* 23.8* 25.5* 24.9*   PLATELETS 10*3/mm3 174 160  --  168     Results from last 7 days   Lab Units 06/04/20  0712 06/03/20  0602 06/02/20  0640   SODIUM mmol/L 138 138 131*   POTASSIUM mmol/L 4.7 5.0 4.7   CHLORIDE mmol/L 108* 108* 104   CO2 mmol/L 22.9 24.3 23.0   BUN mg/dL 40* 42* 34*   CREATININE mg/dL 1.56* 1.56* 1.55*   CALCIUM mg/dL 8.8 8.4* 8.5*   GLUCOSE mg/dL 129* 98 160*     Results from last 7 days   Lab Units 06/04/20  0712 06/03/20  0602 06/02/20  0640   INR  1.07 1.14* 1.15*     Lab Results   Lab Value Date/Time    LIPASE 58 11/30/2017 2155    LIPASE 30 08/27/2016 1627    LIPASE 30 02/14/2016 0635       Radiology:  NM GI Blood Loss   Final Result       No intestinal hemorrhage is identified at the time of the exam.   Follow-up/further evaluation can be obtained as indications persist.       This report was finalized on 6/2/2020 2:35 PM by Dr. Drake Dennis M.D.          XR Abdomen KUB   Final Result      CT Head Without Contrast   Final Result   Evidence of moderate small vessel chronic ischemic change as   described. Right maxillary sinus polyp. No acute intracranial   abnormalities were identified.       This report was finalized on 5/26/2020 2:11 AM by Dr. Javad Mckee M.D.          XR Shoulder 2+ View Right   Final Result      XR Spine Thoracic 3 View   Final Result          Assessment/Plan     Patient Active Problem List   Diagnosis   • Major  depressive disorder with single episode, in partial remission (CMS/HCC)   • Other hyperlipidemia   • Chronic ischemic heart disease   • Vitamin D deficiency   • Erectile dysfunction   • Chronic fatigue   • Type 2 diabetes mellitus with ophthalmic complication (CMS/HCC)   • Skin lesion of chest wall   • Slow transit constipation   • Benign prostatic hyperplasia with nocturia   • CKD (chronic kidney disease) stage 3, GFR 30-59 ml/min (CMS/HCC)   • History of basal cell carcinoma   • High blood pressure associated with diabetes (CMS/HCC)   • Acquired hypothyroidism   • Recurrent strokes (CMS/HCC)   • Urinary retention   • Pneumonia   • Chronic combined systolic and diastolic congestive heart failure (CMS/HCC)   • Acute respiratory failure with hypoxia (CMS/HCC)   • Suspected sleep apnea   • Pleural effusion   • Chronic combined systolic and diastolic congestive heart failure (CMS/HCC)   • YAW on CPAP   • Coronary artery disease involving native coronary artery of native heart without angina pectoris   • Coronary artery disease   • Acute on chronic congestive heart failure (CMS/HCC)   • Elevated troponin   • Colon cancer screening   • Enlarged lymph nodes   • Functional gait abnormality   • History of myocardial infarction   • Hospital discharge follow-up   • Insomnia   • Iron deficiency anemia   • Major depression, recurrent (CMS/HCC)   • Coronary artery disease involving native coronary artery of native heart without angina pectoris   • Anemia, chronic renal failure, stage 3 (moderate) (CMS/HCC)   • Essential hypertension   • Adverse effect of iron and its compounds, initial encounter   • Acute on chronic renal insufficiency   • Generalized weakness   • Falls frequently   • Type 2 diabetes mellitus with hyperglycemia (CMS/HCC)   • Acute pain of right shoulder   • Acute on chronic anemia   • Heme positive stool   • Neurogenic orthostatic hypotension (CMS/HCC)   • Symptomatic anemia   • History of colon polyps   •  Helicobacter pylori gastritis       Assessment:  1. Chronic anemia   2. Status post EGD and colonoscopy  3. History of polyps  4. H. pylori gastritis-treatment initiated with doxycycline, Flagyl, bismuth and PPI x 14 days  5. GERD with Kaur's      Plan:  · hemolobin stable-plans for discharge noted  · Complete therapy for H. pylori- will need outpatient breath test  · Daily PPI due to Kaur's for completion of H. pylori regimen  · continue iron supplementation  · Will arrange for outpatient capsule evaluation due to anemia  · Will need outpatient follow-up with Dr. Louie    I discussed the patients findings and my recommendations with patient.    Yesenia Blancas MD

## 2020-06-04 NOTE — TELEPHONE ENCOUNTER
Sent staff message to Cassandra Cervantes to place order. Also sent to Alice Ambrosio for Approval before scheduling

## 2020-06-04 NOTE — PLAN OF CARE
Patient  resting  at this  time.  Sleeping off  and on.  Continue  treatment for  H Pylori. No  dark  stools  at this time.  Ambulated  about  50 feet this  shift.  PRN  Lidocaine  applied  right  shoulder for  pain. Needs  Prescription  for   thigh  high  JOBST  stocking.  SR  with AV block  and BBB  on monitor. Possible  discharge  today.  Nursing will  continue  to monitor.  Problem: Patient Care Overview  Goal: Plan of Care Review  Outcome: Ongoing (interventions implemented as appropriate)  Flowsheets (Taken 6/4/2020 0327)  Progress: improving  Plan of Care Reviewed With: patient     Problem: Fall Risk (Adult)  Goal: Absence of Fall  Outcome: Ongoing (interventions implemented as appropriate)  Flowsheets (Taken 6/4/2020 0327)  Absence of Fall: making progress toward outcome     Problem: Renal Failure/Kidney Injury, Acute (Adult)  Goal: Signs and Symptoms of Listed Potential Problems Will be Absent, Minimized or Managed (Renal Failure/Kidney Injury, Acute)  Outcome: Ongoing (interventions implemented as appropriate)  Flowsheets (Taken 6/4/2020 0327)  Problems Assessed (Acute Renal Failure/Kidney Injury): all  Problems Present (ARF/Kidney Injury): electrolyte imbalance; situational response     Problem: Pain, Acute (Adult)  Goal: Acceptable Pain Control/Comfort Level  Outcome: Ongoing (interventions implemented as appropriate)  Flowsheets (Taken 6/4/2020 0327)  Acceptable Pain Control/Comfort Level: making progress toward outcome

## 2020-06-04 NOTE — PROGRESS NOTES
"   LOS: 9 days    Patient Care Team:  Florencia Caballero MD as PCP - General (Internal Medicine)  Amber Murguia MD as Consulting Physician (Cardiology)  Sera [facility] as Pharmacist  Seth Ladd MD as Surgeon (General Surgery)  Kim Hoyos MD PhD as Consulting Physician (Hematology and Oncology)  Jerome Diallo MD as Referring Physician (Family Medicine)    Chief Complaint:    Chief Complaint   Patient presents with   • Fall   • Weakness - Generalized     Follow UP SYLVAIN CKD3  Subjective     Interval History: transfused 1u PRBC yest; more stable with ambulation yest; denies dyspnea; still fatigued; resting BPs better ie 150s systolic this aM      Objective     Vital Signs  Temp:  [97.6 °F (36.4 °C)-98.2 °F (36.8 °C)] 97.8 °F (36.6 °C)  Heart Rate:  [] 64  Resp:  [17-20] 18  BP: (108-182)/(60-80) 156/79    Flowsheet Rows      First Filed Value   Admission Height  180.3 cm (71\") Documented at 05/25/2020 1547   Admission Weight  74.8 kg (165 lb) Documented at 05/25/2020 1547          I/O this shift:  In: -   Out: 250 [Urine:250]  I/O last 3 completed shifts:  In: 855 [P.O.:570; Blood:285]  Out: 1925 [Urine:1925]    Intake/Output Summary (Last 24 hours) at 6/4/2020 0751  Last data filed at 6/4/2020 0735  Gross per 24 hour   Intake 855 ml   Output 1600 ml   Net -745 ml       Physical Exam:  GEN frail WM in no distress  Neck supple no JVD  Lungs CTA bilat no rales  CV RRR no m/g  abd soft NT/ND  vasc no pedal/ankle edema 2+ radial pulses, knee high compression        Results Review:    Results from last 7 days   Lab Units 06/03/20  0602 06/02/20  0640 06/01/20  0625   SODIUM mmol/L 138 131* 137   POTASSIUM mmol/L 5.0 4.7 5.2   CHLORIDE mmol/L 108* 104 106   CO2 mmol/L 24.3 23.0 24.0   BUN mg/dL 42* 34* 39*   CREATININE mg/dL 1.56* 1.55* 1.64*   CALCIUM mg/dL 8.4* 8.5* 8.5*   GLUCOSE mg/dL 98 160* 114*       Estimated Creatinine Clearance: 53.6 mL/min (A) (by C-G formula based on SCr of 1.56 " mg/dL (H)).    Results from last 7 days   Lab Units 06/03/20  0602 06/02/20  0640 05/31/20  0632   MAGNESIUM mg/dL  --   --  2.0   PHOSPHORUS mg/dL 3.5 2.8 3.1             Results from last 7 days   Lab Units 06/04/20  0712 06/03/20  0602 06/02/20  1239 06/02/20  0640 05/30/20  0643 05/29/20  0704   WBC 10*3/mm3 6.34 6.84  --  10.58 7.72 7.02   HEMOGLOBIN g/dL 8.2* 7.8* 8.4* 8.3* 10.1* 9.7*   PLATELETS 10*3/mm3 174 160  --  168 180 168       Results from last 7 days   Lab Units 06/04/20  0712 06/03/20  0602 06/02/20  0640 06/01/20  0625 05/31/20  0632   INR  1.07 1.14* 1.15* 1.11* 1.17*         Imaging Results (Last 24 Hours)     ** No results found for the last 24 hours. **          aspirin 81 mg Oral QAM   atorvastatin 40 mg Oral Nightly   bismuth subsalicylate 524 mg Oral Q6H   buPROPion  mg Oral Daily   cholecalciferol 1,000 Units Oral Daily   doxycycline 100 mg Oral Q12H   ferrous sulfate 325 mg Oral TID With Meals   folic acid 1 mg Oral Daily   heparin 0.3 mL Other Once in imaging   insulin glargine 15 Units Subcutaneous QAM   insulin lispro 0-7 Units Subcutaneous TID AC   insulin lispro 5 Units Subcutaneous TID With Meals   levothyroxine 75 mcg Oral Q AM   lidocaine 1 patch Transdermal Q24H   metroNIDAZOLE 250 mg Oral Q6H   midodrine 10 mg Oral TID AC   pantoprazole 40 mg Oral Q AM   tamsulosin 0.4 mg Oral Nightly   vitamin B-12 1,000 mcg Oral Daily       Pharmacy to dose warfarin        Medication Review:   Current Facility-Administered Medications   Medication Dose Route Frequency Provider Last Rate Last Dose   • aspirin EC tablet 81 mg  81 mg Oral QAM Jose Enrique Louie MD   81 mg at 06/04/20 0653   • atorvastatin (LIPITOR) tablet 40 mg  40 mg Oral Nightly Jose Enrique Louie MD   40 mg at 06/03/20 2015   • bismuth subsalicylate (PEPTO BISMOL) chewable tablet 524 mg  524 mg Oral Q6H Amanda Lovelace MD   524 mg at 06/04/20 0239   • buPROPion XL (WELLBUTRIN XL) 24 hr tablet 150 mg  150 mg Oral  Daily Jose Enrique Louie MD   150 mg at 06/03/20 0919   • cholecalciferol (VITAMIN D3) tablet 1,000 Units  1,000 Units Oral Daily Jose Enrique Louie MD   1,000 Units at 06/03/20 0919   • dextrose (D50W) 25 g/ 50mL Intravenous Solution 25 g  25 g Intravenous Q15 Min PRN Jose Enrique Louie MD   25 g at 05/29/20 1723   • dextrose (GLUTOSE) oral gel 15 g  15 g Oral Q15 Min PRN Jose Enrique Louie MD       • doxycycline (MONODOX) capsule 100 mg  100 mg Oral Q12H Amanda Lovelace MD   100 mg at 06/03/20 2015   • ferrous sulfate tablet 325 mg  325 mg Oral TID With Meals Jose Enrique Louie MD   325 mg at 06/03/20 1754   • folic acid (FOLVITE) tablet 1 mg  1 mg Oral Daily Jose Enrique Louie MD   1 mg at 06/03/20 0918   • glucagon (human recombinant) (GLUCAGEN DIAGNOSTIC) injection 1 mg  1 mg Subcutaneous Q15 Min PRN Jose Enrique Louie MD   1 mg at 05/29/20 1153   • heparin injection 30 Units  0.3 mL Other Once in imaging Eliseo Gross MD       • insulin glargine (LANTUS) injection 15 Units  15 Units Subcutaneous QAM Jose Enrique Louie MD   15 Units at 06/03/20 0704   • insulin lispro (humaLOG) injection 0-7 Units  0-7 Units Subcutaneous TID AC Jose Enrique Louie MD   2 Units at 06/03/20 1755   • insulin lispro (humaLOG) injection 5 Units  5 Units Subcutaneous TID With Meals Eliseo Gross MD   5 Units at 06/03/20 1755   • levothyroxine (SYNTHROID, LEVOTHROID) tablet 75 mcg  75 mcg Oral Q AM Eliseo Gross MD   75 mcg at 06/04/20 0549   • lidocaine (LIDODERM) 5 % 1 patch  1 patch Transdermal Q24H Jose Enrique Louie MD   1 patch at 06/03/20 0919   • lidocaine (XYLOCAINE) 5 % ointment   Topical PRN Jose Enrique Louie MD       • metroNIDAZOLE (FLAGYL) tablet 250 mg  250 mg Oral Q6H Amanda Lovelace MD   250 mg at 06/04/20 0549   • midodrine (PROAMATINE) tablet 10 mg  10 mg Oral TID AC Noemy Rea APRN   10 mg at 06/04/20 0653   • nitroglycerin (NITROSTAT) SL tablet 0.4 mg  0.4  mg Sublingual Q5 Min PRN Jose Enriuqe Louie MD       • ondansetron (ZOFRAN) injection 4 mg  4 mg Intravenous Q6H PRN Jose Enrique Louie MD       • pantoprazole (PROTONIX) EC tablet 40 mg  40 mg Oral Q AM Amanda Lovelace MD   40 mg at 06/04/20 0653   • Pharmacy to dose warfarin   Does not apply Continuous PRN Jose Enrique Louie MD       • sodium chloride 0.9 % flush 10 mL  10 mL Intravenous PRN Jose Enrique Louie MD   10 mL at 06/03/20 0919   • tamsulosin (FLOMAX) 24 hr capsule 0.4 mg  0.4 mg Oral Nightly Jose Enrique Louie MD   0.4 mg at 06/03/20 2015   • vitamin B-12 (CYANOCOBALAMIN) tablet 1,000 mcg  1,000 mcg Oral Daily Jose Enrique Louie MD   1,000 mcg at 06/03/20 0918       Assessment/Plan   1.  SYLVAIN on CKD3: nonoliguric, likely due to hypotension and hypovolemia. Resolved, Cr stable 1.5 (yest).  Followed by Dr. Bryan Huddleston in our office.  ACE inhibitor is on hold.  K been low 5's so changed boost to renvela supplement and added K restriction to diet    2.  Progressive weakness and recent falls; has lost 30 pounds over the last 6 months  3.  Anemia: Heme-positive stools, peptic ulcer disease on EGD; cscope with polyp, resected; hgb up slightly 7.8 to 8.2 following 1u PRBC yest; tagged RBC scan NEG  4.  PVD with prior strokes on AC  5.  DM2 with early DR; poor control by A1c  6.  HTN with severe orthostatic hypotension.  Intermittent IVF has not helped.  Midodrine inc'd 10 TID.  Will try to change knee high to thigh compression; resting BPs better following transfusion, will see if less orthostatic  -- d/w Dr Badillo yesterday  7.  Ischemic cardiomyopathy - compensated w/o diuretic; wt fluctuating low to mid 170s    Plan  - follow up labs & repeat orthostatics this AM  - d/w RN, may get Rx from Dr Gross for thigh high compression to obtain as outpatient   - note plan for DC home per CCP rather than rehab       Chronic ischemic heart disease    CKD (chronic kidney disease) stage 3, GFR 30-59 ml/min  (CMS/HCC)    Acquired hypothyroidism    Recurrent strokes (CMS/HCC)    Chronic combined systolic and diastolic congestive heart failure (CMS/HCC)    YAW on CPAP    Elevated troponin    Anemia, chronic renal failure, stage 3 (moderate) (CMS/HCC)    Essential hypertension    Acute on chronic renal insufficiency    Generalized weakness    Falls frequently    Type 2 diabetes mellitus with hyperglycemia (CMS/HCC)    Acute pain of right shoulder    Lower abdominal pain    Acute on chronic anemia    Heme positive stool    Neurogenic orthostatic hypotension (CMS/HCC)    Symptomatic anemia    History of colon polyps              Jose Enrique Montelongo MD  06/04/20  07:51

## 2020-06-04 NOTE — PROGRESS NOTES
Pharmacy Consult: Warfarin Dosing/ Monitoring    Carlito Mo is a 65 y.o. male, estimated creatinine clearance is 50.6 mL/min (A) (by C-G formula based on SCr of 1.64 mg/dL (H)). weighing 79.7 kg (175 lb 9.6 oz).     has a past medical history of Acquired hypothyroidism, Acute congestive heart failure (CMS/HCC), Acute respiratory failure with hypoxia (CMS/[facility]), Acute sinusitis, SYLVAIN (acute kidney injury) (CMS/[facility]), Anemia, Arthritis, CAD (coronary artery disease), Cancer (CMS/HCC), Chronic combined systolic and diastolic congestive heart failure (CMS/[facility]), Chronic ischemic heart disease, CKD (chronic kidney disease), COPD (chronic obstructive pulmonary disease) (CMS/[facility]), Depression, Diabetes mellitus type 2, uncontrolled, without complications (CMS/[facility]), Disease of thyroid gland, Fatigue, Frequent PVCs, Heart attack (CMS/[facility]), History of coronary artery disease, History of transfusion, Hyperglycemia, Hyperlipidemia, Hypertension, Hypertensive encephalopathy, Mental status change, YAW on CPAP, Pleural effusion, Pneumonia, RBBB (right bundle branch block), Screening for prostate cancer (2011), Stroke (CMS/[facility]), TIA (transient ischemic attack), Type 2 diabetes mellitus (CMS/[facility]), Urinary retention, and Vitamin D deficiency.    Social History     Tobacco Use    Smoking status: Never Smoker    Smokeless tobacco: Never Used    Tobacco comment: daily caffeine   Substance Use Topics    Alcohol use: No    Drug use: No       Results from last 7 days   Lab Units 06/01/20  0625 05/31/20  0632 05/30/20  0643 05/29/20  0704 05/28/20  0653 05/27/20  0608 05/26/20  0603  05/25/20  1557   INR  1.11* 1.17* 1.24* 1.50* 2.04* 2.12* 2.04*   < > 1.69*   HEMOGLOBIN g/dL  --   --  10.1* 9.7* 9.9* 10.5* 10.9*  --  11.5*   HEMATOCRIT %  --   --  30.6* 29.4* 30.3* 32.3* 32.4*  --  35.8*   PLATELETS 10*3/mm3  --   --  180 168 185 205 221  --  241    < > = values in this interval not displayed.     Results from last 7 days   Lab Units  06/01/20  0625 05/31/20  0632 05/30/20  0643  05/27/20  0608 05/26/20  0603 05/25/20  1557   SODIUM mmol/L 137 136 140   < > 141 139 134*   POTASSIUM mmol/L 5.2 5.1 4.9   < > 4.0 4.3 4.7   CHLORIDE mmol/L 106 108* 107   < > 109* 107 99   CO2 mmol/L 24.0 23.0 25.3   < > 24.7 24.9 25.3   BUN mg/dL 39* 41* 33*   < > 35* 51* 60*   CREATININE mg/dL 1.64* 1.78* 1.61*   < > 1.57* 1.95* 2.59*   CALCIUM mg/dL 8.5* 8.2* 8.6   < > 8.4* 8.7 8.8   BILIRUBIN mg/dL  --   --   --   --  0.2 0.2 0.3   ALK PHOS U/L  --   --   --   --  56 61 72   ALT (SGPT) U/L  --   --   --   --  41 46* 54*   AST (SGOT) U/L  --   --   --   --  19 15 15   GLUCOSE mg/dL 114* 109* 232*   < > 60* 78 398*    < > = values in this interval not displayed.     Anticoagulation history: h/o recurrent strokes anticoagulated on warfarin. Managed by BRENDAN Ramesh, last noted checkup 3/27/20 - INR 2.48, warfarin 8mg M/Th and Warfarin 6mg all other days (also confirmed by patient). Regimen has largely provided therapeutic concentrations over past 6 months.    Hospital Anticoagulation:  Consulting provider: Dr. Awad  Start date: 5/25/20  Indication: Other-full anticoagulation, recurrent strokes  Target INR: 2-3  Expected duration: Long-term   Bridge Therapy: No    Date 5/25 5/26 5/27 5/28 5/29 5/30 5/31 6/1   6/2 6/3 6/4   INR 1.69 2.04 2.12 2.04 1.50 1.24 1.17 1.11 1.15 1.14 1.07   Warfarin dose 8mg (taken pta) 6mg held held held held held held  held held hold     Potential drug interactions:   Aspirin (home med), bismuth subsalicylate - may increase risk of bleeding  Levothyroxine (home med), metronidazole, doxycycline - May enhance the anticoagulant effect of warfarin.     Relevant nutrition status: Regular cardiac diet    Other: 5/29 EGD+colonoscopy- significant esophagitis, gastritis 2/2 H.pylori (Quad therapy started), colon polp removed.     Education complete?/ Date: TBD    Assessment/Plan:  Today's INR= 1.07. S/P 1 unit of blood yesterday with response observed in  Hgb. Continue to hold warfarin and follow GI/cardiology plans for appropriate resumption of therapy.    Pharmacy will continue to follow until discharge or discontinuation of warfarin.     Thank you for allowing me to participate in the care of your patient,  Eugenia Brambila, PharmD

## 2020-06-05 ENCOUNTER — TELEPHONE (OUTPATIENT)
Dept: GASTROENTEROLOGY | Facility: CLINIC | Age: 65
End: 2020-06-05

## 2020-06-05 ENCOUNTER — READMISSION MANAGEMENT (OUTPATIENT)
Dept: CALL CENTER | Facility: HOSPITAL | Age: 65
End: 2020-06-05

## 2020-06-05 NOTE — OUTREACH NOTE
Prep Survey      Responses   [facility] facility patient discharged from?  [facility]   Is LACE score < 7 ?  No   Eligibility  Readm Mgmt   Discharge diagnosis  CHF, colonoscopy   COVID-19 Test Status  Negative   Does the patient have one of the following disease processes/diagnoses(primary or secondary)?  CHF   Does the patient have Home health ordered?  Yes   What is the Home health agency?   [facility]   Is there a DME ordered?  Yes   What DME was ordered?  [employer] stockings from [place]   Prep survey completed?  Yes          Cara Swann RN

## 2020-06-05 NOTE — TELEPHONE ENCOUNTER
Called and left patient a voicemail to contact the office to make a 4-6 week hospital follow up with .

## 2020-06-05 NOTE — TELEPHONE ENCOUNTER
Heidi Elmore  You 33 minutes ago (10:21 AM)      Can you place a order for me please     **Case request placed for EGD in 3mo's - message to Dr Louie.  Helen Cruz RN.

## 2020-06-08 ENCOUNTER — READMISSION MANAGEMENT (OUTPATIENT)
Dept: CALL CENTER | Facility: HOSPITAL | Age: 65
End: 2020-06-08

## 2020-06-08 ENCOUNTER — TELEPHONE (OUTPATIENT)
Dept: SOCIAL WORK | Facility: HOSPITAL | Age: 65
End: 2020-06-08

## 2020-06-08 NOTE — OUTREACH NOTE
CHF Week 1 Survey      Responses   [facility] patient discharged from?  [place]   Does the patient have one of the following disease processes/diagnoses(primary or secondary)?  CHF   Is there a successful TCM telephone encounter documented?  No   CHF Week 1 attempt successful?  Yes   Call start time  1103   Call end time  1113   Discharge diagnosis  CHF, colonoscopy   Is patient permission given to speak with other caregiver?  Yes   List who call center can speak with  wife, Jenifer Simmons reviewed with patient/caregiver?  Yes   Is the patient having any side effects they believe may be caused by any medication additions or changes?  No   Does the patient have all medications ordered at discharge?  Yes   Is the patient taking all medications as directed (includes completed medication regime)?  Yes   Does the patient have a primary care provider?   Yes   Does the patient have an appointment with their PCP within 7 days of discharge?  Yes   Has the patient kept scheduled appointments due by today?  N/A   Comments  Dr Murguia Thursday 06/11/2020 Will make appt with PCP today   What is the Home health agency?   [facility]   Has home health visited the patient within 72 hours of discharge?  Yes   What DME was ordered?  Jobst stockings from [employer]   Has all DME been delivered?  Yes   DME comments  Wears O2 most nights   Psychosocial issues?  No   Comments  Says stools are still dark. Advised to call MD. Discussed Pepto can cause dark stools.   Did the patient receive a copy of their discharge instructions?  Yes   Nursing interventions  Reviewed instructions with patient   What is the patient's perception of their health status since discharge?  Improving   Nursing interventions  Nurse provided patient education   Is the patient weighing daily?  No   Does the patient have scales?  Yes   Is the patient able to teach back Heart Failure diet management?  Yes   Is the patient able to teach back Heart Failure Zones?  Yes [between  green yellow zone]   Is the patient able to teach back signs and symptoms of worsening condition? (i.e. weight gain, shortness of air, etc.)  Yes   Is the patient/caregiver able to teach back the hierarchy of who to call/visit for symptoms/problems? PCP, Specialist, Home health nurse, Urgent Care, ED, 911  Yes    CHF Week 1 call completed?  Yes          Татьяна Sahni RN

## 2020-06-08 NOTE — TELEPHONE ENCOUNTER
Spoke with Amber with [employer]  [phone]- Ref ID [id number]-itvo morning and she noted that the prior auth was still pending. Received inbound fax with approval- placed call to [facility] to inform- spoke with Eliseo- he informed that he would run it through- he noted it processed without problem- he stated that they would be reaching out to patient- I informed I called this morning and had to leave vmm- he called back this afternoon and left vmm- I called him back and received vmm again.  Eliseo informed that they deliver his medications- they would reach out to him and schedule delivery.  Angelito BENZ,CCP

## 2020-06-10 PROBLEM — K20.90 ESOPHAGITIS: Status: ACTIVE | Noted: 2020-06-10

## 2020-06-11 ENCOUNTER — OFFICE VISIT (OUTPATIENT)
Dept: CARDIOLOGY | Facility: CLINIC | Age: 65
End: 2020-06-11

## 2020-06-11 ENCOUNTER — TELEPHONE (OUTPATIENT)
Dept: PHARMACY | Facility: HOSPITAL | Age: 65
End: 2020-06-11

## 2020-06-11 ENCOUNTER — HOSPITAL ENCOUNTER (OUTPATIENT)
Dept: CARDIOLOGY | Facility: HOSPITAL | Age: 65
Discharge: HOME | End: 2020-06-11
Admitting: NURSE PRACTITIONER

## 2020-06-11 VITALS
DIASTOLIC BLOOD PRESSURE: 76 MMHG | HEIGHT: 71 IN | WEIGHT: 179 LBS | OXYGEN SATURATION: 98 % | SYSTOLIC BLOOD PRESSURE: 180 MMHG | HEART RATE: 70 BPM | BODY MASS INDEX: 25.06 KG/M2

## 2020-06-11 DIAGNOSIS — D63.1 ANEMIA, CHRONIC RENAL FAILURE, STAGE 3 (MODERATE) (HCC): ICD-10-CM

## 2020-06-11 DIAGNOSIS — G47.33 OSA ON CPAP: ICD-10-CM

## 2020-06-11 DIAGNOSIS — I95.1 ORTHOSTATIC HYPOTENSION: ICD-10-CM

## 2020-06-11 DIAGNOSIS — I25.10 CORONARY ARTERY DISEASE INVOLVING NATIVE CORONARY ARTERY OF NATIVE HEART WITHOUT ANGINA PECTORIS: ICD-10-CM

## 2020-06-11 DIAGNOSIS — N18.30 ANEMIA, CHRONIC RENAL FAILURE, STAGE 3 (MODERATE) (HCC): ICD-10-CM

## 2020-06-11 DIAGNOSIS — I10 ESSENTIAL HYPERTENSION: ICD-10-CM

## 2020-06-11 DIAGNOSIS — Z99.89 OSA ON CPAP: ICD-10-CM

## 2020-06-11 DIAGNOSIS — I50.42 CHRONIC COMBINED SYSTOLIC AND DIASTOLIC CONGESTIVE HEART FAILURE (HCC): Primary | ICD-10-CM

## 2020-06-11 LAB
BASOPHILS # BLD AUTO: 0.01 10*3/MM3 (ref 0–0.2)
BASOPHILS NFR BLD AUTO: 0.2 % (ref 0–1.5)
DEPRECATED RDW RBC AUTO: 46.3 FL (ref 37–54)
EOSINOPHIL # BLD AUTO: 0.2 10*3/MM3 (ref 0–0.4)
EOSINOPHIL NFR BLD AUTO: 3.8 % (ref 0.3–6.2)
ERYTHROCYTE [DISTWIDTH] IN BLOOD BY AUTOMATED COUNT: 14.5 % (ref 12.3–15.4)
HCT VFR BLD AUTO: 28.8 % (ref 37.5–51)
HGB BLD-MCNC: 9.4 G/DL (ref 13–17.7)
IMM GRANULOCYTES # BLD AUTO: 0.02 10*3/MM3 (ref 0–0.05)
IMM GRANULOCYTES NFR BLD AUTO: 0.4 % (ref 0–0.5)
LYMPHOCYTES # BLD AUTO: 0.98 10*3/MM3 (ref 0.7–3.1)
LYMPHOCYTES NFR BLD AUTO: 18.6 % (ref 19.6–45.3)
MCH RBC QN AUTO: 28.3 PG (ref 26.6–33)
MCHC RBC AUTO-ENTMCNC: 32.6 G/DL (ref 31.5–35.7)
MCV RBC AUTO: 86.7 FL (ref 79–97)
MONOCYTES # BLD AUTO: 0.5 10*3/MM3 (ref 0.1–0.9)
MONOCYTES NFR BLD AUTO: 9.5 % (ref 5–12)
NEUTROPHILS # BLD AUTO: 3.56 10*3/MM3 (ref 1.7–7)
NEUTROPHILS NFR BLD AUTO: 67.5 % (ref 42.7–76)
NRBC BLD AUTO-RTO: 0 /100 WBC (ref 0–0.2)
PLATELET # BLD AUTO: 221 10*3/MM3 (ref 140–450)
PMV BLD AUTO: 9.5 FL (ref 6–12)
RBC # BLD AUTO: 3.32 10*6/MM3 (ref 4.14–5.8)
WBC NRBC COR # BLD: 5.27 10*3/MM3 (ref 3.4–10.8)

## 2020-06-11 PROCEDURE — 85025 COMPLETE CBC W/AUTO DIFF WBC: CPT | Performed by: NURSE PRACTITIONER

## 2020-06-11 PROCEDURE — 93000 ELECTROCARDIOGRAM COMPLETE: CPT | Performed by: NURSE PRACTITIONER

## 2020-06-11 PROCEDURE — 99214 OFFICE O/P EST MOD 30 MIN: CPT | Performed by: NURSE PRACTITIONER

## 2020-06-11 PROCEDURE — 36415 COLL VENOUS BLD VENIPUNCTURE: CPT

## 2020-06-11 NOTE — PROGRESS NOTES
Date of Office Visit: 2020  Encounter Provider: Nadine Blake, JULIO C, APRN  Place of Service: [facility] CARDIOLOGY  Patient Name: Carlito Mo  :1955        Subjective:     Chief Complaint:  Follow-up, heart failure, coronary disease, hypertension with orthostatic hypotension.    History of Present Illness:  Carlito Mo is a 65 y.o. male patient of Dr. Murguia.  I am seeing this patient in the office today and I have reviewed his records.    Patient has a history of   Patient has a history of coronary artery disease, cardiomyopathy, congestive heart failure, respiratory failure, chronic kidney disease, hypothyroidism, type 2 diabetes, history of stroke while on dual antiplatelet therapy now on warfarin.     Patient was first seen by Dr. Murguia 2017 when he was admitted with altered mental status.  He was diagnosed with multiple small acute infarcts.  Echocardiogram 2017 showed mild left ventricular hypertrophy, mild left atrial enlargement, negative bubble study, normal LV systolic function with an ejection fraction of 60%.  Transesophageal echo 2017 showed that his LV function was mildly low with an ejection fraction of 45% with focal wall motion abnormalities primarily in the septum and anterior wall and apex.  Small patent foramen ovale was noted and no significant valvular heart disease or cardiac source of emboli minus a small PFO were seen.  Holter monitor showed a couple of short bursts of SVT but no sustained arrhythmia.  He was seen back in the hospital 2017 in May 2018 with altered mental status, as well as heart failure exacerbations.  Last echocardiogram 2018 showed mildly decreased left ventricular systolic function with EF of 43%.  Severe hypokinesis of the distal anterior septum and anterior apex noted.  Mild mitral valve regurgitation was present, left atrial cavity size was dilated, right ventricular cavity was mildly  dilated, with trivial pericardial effusion. Patient presented to the ER 4/23/2019 complaining of intermittent chest discomfort overnight.  Troponin was elevated 0.045 and 0.044.  Troponins have been elevated in this range prior admissions as well.  He had nitroglycerin paste which helped with the pain.  Prior to this chest pain episode he had not had any other episodes of chest pain.  Nuclear stress test was ordered which showed medium sized infarct in the apex without significant ischemia.  Beta-blockade was restarted.  Cardiac catheterization to be considered if chest pain recurs or becomes persistent.  Patient presented to the hospital 7/2/2019 reporting increased leg swelling and shortness of breath over the previous week.  Chest x-ray showed increased and previously noted pleural effusions.  BNP was elevated above baseline.  Troponin was negative and EKG did not show acute changes.  Patient was admitted for IV diuresis and was then transitioned to oral diuretics.  He continued to improve with creatinine elevated though not beyond previous baseline range.  He was discharged 7/6/2019.  When patient was seen in office 3/2020 by APRN he denied anginal symptoms.  He was being followed by [facility] for his chronic heart failure.    Patient was hospitalized 5/25/20-6/4/20 with acute kidney injury and anemia.  Echocardiogram 5/27/2020 showed normal LV systolic function with EF of 60%, hypokinesis of the distal anteroseptal, distal inferior septal, and apex, mild concentric LVH, mildly dilated left atrial cavity.  He received small amount of IV fluid resuscitation after which SYLVAIN resolved.  GI was consulted for anemia.  No gross bleeding initially but Hemoccult was positive.  Patient had EGD showing nonbleeding gastritis and biopsies were positive for H. pylori for which he was to be treated with antibiotics.  Colonoscopy showed some old blood and a polyp in the ascending colon which was removed.  He  was instructed to follow-up with gastroenterology as an outpatient with consideration for possible capsule endoscopy.  Hemoglobin was stable at discharge with no signs of bleeding.  He was continued on aspirin warfarin was held for the time being.  Antihypertensives were discontinued for orthostatic hypotension.  He was started on midodrine for continued orthostatic hypotension despite fluid resuscitation and discontinuation of blood pressure medications.  He was transfused with a unit of packed red blood cells.  Compression socks are recommended as well.  Orthostatic hypotension was felt to be a component of autonomic neuropathy from diabetes.      Patient presents to office today for follow-up appointment.  Patient reports he is doing okay since hospital discharge.  He still has some fatigue but feels that this is improving since hospital discharge.  He still gets some mild shortness of breath with activities but also feels that this is improving since hospital discharge.  Denies any chest pain, palpitations, syncope, near syncope, falls, or edema.  He still gets them lightheadedness with rapid position changes but reports that this is improved on the Middaugh drain and with use of compression socks.  He denies falls since hospital discharge.  He reports that he has not seen any abnormal bleeding--no blood in urine or stool that he is seen--however he does continue to have dark stool though he was told that this could be from Pepto-Bismol and this was going on in the hospital as well.  He appears euvolemic on exam.  Orthostatic blood pressures in the office today showed quite a drop from lying, to sitting, to standing.  However patient reports that the blood pressures were checked on his right arm and he has some joint pain to his right shoulder and so some of the high readings could have been from the shoulder pain.  Blood pressure on recheck while sitting on exam table was 148/80.  He reports that he has been  monitoring blood pressure at home since hospital discharge and has been staying around 130/90.        Past Medical History:   Diagnosis Date   • Acquired hypothyroidism    • Acute congestive heart failure (CMS/HCC)    • Acute respiratory failure with hypoxia (CMS/HCC)    • Acute sinusitis    • SYLVAIN (acute kidney injury) (CMS/HCC)     on CKD   • Anemia    • Arthritis    • CAD (coronary artery disease)    • Cancer (CMS/HCC)     skin   • Chronic combined systolic and diastolic congestive heart failure (CMS/HCC)    • Chronic ischemic heart disease    • CKD (chronic kidney disease)    • COPD (chronic obstructive pulmonary disease) (CMS/HCC)    • Depression    • Diabetes mellitus type 2, uncontrolled, without complications    • Disease of thyroid gland    • Fatigue    • Frequent PVCs    • Heart attack (CMS/HCC)    • History of coronary artery disease     with remote history of bypass surgery in 2001   • History of transfusion    • Hyperglycemia    • Hyperlipidemia    • Hypertension    • Hypertensive encephalopathy    • Mental status change     Acute   • YAW on CPAP    • Pleural effusion    • Pneumonia    • RBBB (right bundle branch block)    • Screening for prostate cancer 2011   • Stroke (CMS/HCC)    • TIA (transient ischemic attack)    • Type 2 diabetes mellitus (CMS/HCC)    • Urinary retention    • Vitamin D deficiency      Past Surgical History:   Procedure Laterality Date   • APPENDECTOMY N/A 02/14/2016    Dr. Alexey Dodson   • COLONOSCOPY      over 20 years ago.  no polyps    • COLONOSCOPY N/A 9/18/2018    Procedure: COLONOSCOPY;  Surgeon: Jose Enrique Louie MD;  Location: [facility] ENDOSCOPY;  Service: Gastroenterology   • COLONOSCOPY N/A 9/19/2018    IH, EH, polyps (TAs w/low grade dysplasia)   • COLONOSCOPY N/A 6/1/2020    Procedure: COLONOSCOPY;  Surgeon: Jose Enrique Louie MD;  Location: [facility] ENDOSCOPY;  Service: Gastroenterology;  Laterality: N/A;  Pre op-Anemia, Melena, History of Polyps  Post op-To  Cecum/TI, Polyp, Poor Prep   • CORONARY ARTERY BYPASS GRAFT  11/2001   • ENDOSCOPY N/A 5/29/2020    Procedure: ESOPHAGOGASTRODUODENOSCOPY with biopsies;  Surgeon: Amanda Lovelace MD;  Location: [facility] ENDOSCOPY;  Service: Gastroenterology;  Laterality: N/A;  pre-anemia, dark stools  post-esophagitis, hiatal hernia   • HERNIA REPAIR Left 12/05/2019   • TONSILLECTOMY     • VASECTOMY       Outpatient Medications Prior to Visit   Medication Sig Dispense Refill   • aspirin 81 MG EC tablet Take 81 mg by mouth Every Morning.     • atorvastatin (LIPITOR) 40 MG tablet Take 40 mg by mouth Every Night.     • bismuth subsalicylate (PEPTO BISMOL) 262 MG chewable tablet Chew 2 tablets Every 6 (Six) Hours for 36 doses. 72 tablet 0   • buPROPion XL (WELLBUTRIN XL) 150 MG 24 hr tablet Take 150 mg by mouth Daily.     • Cholecalciferol (VITAMIN D3) 5000 units capsule capsule Take 5,000 Units by mouth Daily.     • doxycycline (MONODOX) 100 MG capsule Take 1 capsule by mouth Every 12 (Twelve) Hours for 17 doses. Indications: Infection Within the Abdomen 17 capsule 0   • ferrous sulfate 325 (65 FE) MG tablet Take 325 mg by mouth 3 (Three) Times a Day With Meals.     • folic acid (FOLVITE) 1 MG tablet Take 1 tablet by mouth Daily. 90 tablet 3   • Insulin Glargine (BASAGLAR KWIKPEN) 100 UNIT/ML injection pen Inject 8 Units under the skin into the appropriate area as directed Every Night. Patient says he does not take consistently     • insulin glulisine (APIDRA) 100 UNIT/ML injection Inject 2 Units under the skin into the appropriate area as directed 3 (Three) Times a Day Before Meals. Patient says he does not take consistently     • levothyroxine (SYNTHROID, LEVOTHROID) 75 MCG tablet Take 75 mcg by mouth Daily.     • lidocaine (LIDODERM) 5 % Place 1 patch on the skin as directed by provider Daily. Remove & Discard patch within 12 hours or as directed by MD 15 each 0   • metroNIDAZOLE (FLAGYL) 250 MG tablet Take 1 tablet by mouth Every 6  (Six) Hours for 37 doses. Indications: Infection Within the Abdomen 37 tablet 0   • midodrine (PROAMATINE) 10 MG tablet Take 1 tablet by mouth 3 (Three) Times a Day Before Meals. 30 tablet 0   • pantoprazole (PROTONIX) 40 MG EC tablet Take 1 tablet by mouth Daily. 30 tablet 0   • tamsulosin (FLOMAX) 0.4 MG capsule 24 hr capsule Take 1 capsule by mouth Every Night.     • vitamin B-12 (CYANOCOBALAMIN) 1000 MCG tablet Take 1 tablet by mouth Daily. 90 tablet 3     No facility-administered medications prior to visit.        Allergies as of 2020 - Reviewed 2020   Allergen Reaction Noted   • Fluoxetine Unknown - High Severity 2018   • Prozac [fluoxetine hcl] Other (See Comments) 2015     Social History     Socioeconomic History   • Marital status:      Spouse name: Lissette   • Number of children: 3   • Years of education: college   • Highest education level: Not on file   Occupational History     Employer: RETIRED   Tobacco Use   • Smoking status: Never Smoker   • Smokeless tobacco: Never Used   • Tobacco comment: daily caffeine   Substance and Sexual Activity   • Alcohol use: No   • Drug use: No   • Sexual activity: Defer     Family History   Problem Relation Age of Onset   • Diabetes Mother    • Hypertension Mother    • Macular degeneration Mother    • Alcohol abuse Father    • Cancer Father         lung,  age 65   • Heart disease Father    • Alcohol abuse Brother    • Cirrhosis Brother          age 50   • Liver cancer Brother 45   • Diabetes Son    • Kidney failure Son    • Autism Son        Review of Systems   Constitution: Positive for decreased appetite and malaise/fatigue. Negative for chills, fever, weight gain and weight loss.   HENT: Negative for ear pain, hearing loss, nosebleeds and sore throat.    Eyes: Negative for double vision, redness, vision loss in left eye and vision loss in right eye.   Cardiovascular:        SEE HPI   Respiratory: Negative for cough,  "shortness of breath, snoring and wheezing.    Endocrine: Negative for cold intolerance and heat intolerance.   Skin: Negative for itching, rash and suspicious lesions.   Musculoskeletal: Positive for joint pain and myalgias. Negative for joint swelling.   Gastrointestinal: Negative for abdominal pain, diarrhea, hematemesis, melena, nausea and vomiting.   Genitourinary: Negative for dysuria, frequency and hematuria.        ED   Neurological: Positive for dizziness. Negative for headaches, numbness, paresthesias and seizures.   Psychiatric/Behavioral: Positive for depression. Negative for altered mental status. The patient is not nervous/anxious.           Objective:     Vitals:    06/11/20 0958 06/11/20 1013 06/11/20 1014   BP: 160/78 136/76 180/76   BP Location: Right arm Right arm    Patient Position: Sitting Standing    Pulse: 79 80 70   SpO2: 98% 99% 98%   Weight: 81.2 kg (179 lb)     Height: 180.3 cm (71\")       Patient reports that he was having shoulder pain when lying down and therefore attributes this to the high blood pressure reading with lying.    BP ON RECHECK 148/80.       Body mass index is 24.97 kg/m².      PHYSICAL EXAM:  Physical Exam   Constitutional: He is oriented to person, place, and time. He appears well-developed and well-nourished. No distress.   HENT:   Head: Normocephalic and atraumatic.   Eyes: Pupils are equal, round, and reactive to light.   Neck: Neck supple. No JVD present.   Cardiovascular: Normal rate, regular rhythm, normal heart sounds and intact distal pulses. Exam reveals no gallop and no friction rub.   No murmur heard.  Pulses:       Radial pulses are 2+ on the right side, and 2+ on the left side.        Posterior tibial pulses are 2+ on the right side, and 2+ on the left side.   Pulmonary/Chest: Effort normal and breath sounds normal. No respiratory distress. He has no wheezes. He has no rales.   Abdominal: Soft. Bowel sounds are normal. He exhibits no distension. There is no " tenderness.   Musculoskeletal: He exhibits no edema, tenderness or deformity.   No edema.  Compression socks in place bilaterally.   Neurological: He is alert and oriented to person, place, and time.   Skin: Skin is warm and dry. No rash noted. He is not diaphoretic. No erythema.   Psychiatric: He has a normal mood and affect. His behavior is normal. Judgment normal.           ECG 12 Lead  Date/Time: 6/11/2020 10:37 AM  Performed by: Nadine Blake DNP, APRN  Authorized by: Nadine Blake DNP, JI   Comparison: compared with previous ECG from 5/25/2020  Similar to previous ECG  Rhythm: sinus rhythm  Rate: normal  BPM: 74  Conduction: right bundle branch block  Other findings comments: Previous anteroseptal infarct  Comments: No significant changes from previous EKG              Assessment:       Diagnosis Plan   1. Chronic combined systolic and diastolic congestive heart failure (CMS/HCC)     2. Essential hypertension     3. Orthostatic hypotension     4. Coronary artery disease involving native coronary artery of native heart without angina pectoris     5. YAW on CPAP           Plan:     1. Heart failure: Normal systolic function on last echo though wall abnormalities still seen consistent with previous MI.  He appears euvolemic on exam today.  2. Hypertension with recent orthostatic hypotension: Cannot use fludrocortisone due to renal dysfunction and CHF.  On midodrine.  Orthostatic hypotension possibly related to diabetic autonomic dysfunction.  He reports his dizziness with position changes and stable with the midodrine.  He has not had any falls, syncope, or near syncope since hospital discharge.  Blood pressure little high with lying in the office today however he reports it hurts his right shoulder to lie down so this may not be accurate.  Blood pressure on recheck a little high at 148/80.  He reports he has been checking blood pressure at home and has been staying around 130/90.  I asked that he  keep a log daily and call with blood pressure readings in 1 week or sooner for high or low readings.  3. Anemia: Has been followed by nephrology and hematology in the past.  Now also followed by GI.  Has follow-up with them scheduled in a few weeks.  Patient to get repeat CBC today to ensure hemoglobin stable.  If stable then will check with GI to find out if he is safe to resume warfarin with close monitoring of hemoglobin and repeat CBC 1 week after restarting or if they prefer for him to hold off until they see him in the office.  4. Chronically elevated troponin  5. Right bundle branch block  6. CKD with recent SYLVAIN: Follows with nephrology, Dr. Huddleston.  7. Type 2 diabetes: Managed by outside provider.  8. History of stroke: NEISHA in the past without cardiac source of embolus.  ZIO Patch did not show atrial fibrillation.  However given recurrent strokes on aspirin and Plavix and concomitant kidney disease neurology had recommended warfarin anticoagulation therapy.  9. YAW on CPAP: Has been using nighttime oxygen recently but has not been using his CPAP recently due to needing a new filter.  Discussed that untreated sleep apnea could also be contributing to his orthostasis and recommended replacing parts as indicated and using nightly if possible.  Continue to follow with sleep medicine.  10. Esophagitis with H pylori: Being followed by GI.  To get possible capsule study as an outpatient.    Patient to schedule 4-6 week hospital follow-up with Dr. Murguia or follow-up sooner if needed for any new, recurrent, or worsening symptoms or other issues/concerns.           Your medication list           Accurate as of June 11, 2020 10:40 AM. If you have any questions, ask your nurse or doctor.               CONTINUE taking these medications      Instructions Last Dose Given Next Dose Due   Apidra 100 UNIT/ML injection  Generic drug:  insulin glulisine      Inject 2 Units under the skin into the appropriate area as directed 3  (Three) Times a Day Before Meals. Patient says he does not take consistently       aspirin 81 MG EC tablet      Take 81 mg by mouth Every Morning.       atorvastatin 40 MG tablet  Commonly known as:  LIPITOR      Take 40 mg by mouth Every Night.       bismuth subsalicylate 262 MG chewable tablet  Commonly known as:  PEPTO BISMOL      Chew 2 tablets Every 6 (Six) Hours for 36 doses.       buPROPion  MG 24 hr tablet  Commonly known as:  WELLBUTRIN XL      Take 150 mg by mouth Daily.       doxycycline 100 MG capsule  Commonly known as:  MONODOX      Take 1 capsule by mouth Every 12 (Twelve) Hours for 17 doses. Indications: Infection Within the Abdomen       ferrous sulfate 325 (65 FE) MG tablet      Take 325 mg by mouth 3 (Three) Times a Day With Meals.       folic acid 1 MG tablet  Commonly known as:  FOLVITE      Take 1 tablet by mouth Daily.       Insulin Glargine 100 UNIT/ML injection pen  Commonly known as:  BASAGLAR KWIKPEN      Inject 8 Units under the skin into the appropriate area as directed Every Night. Patient says he does not take consistently       levothyroxine 75 MCG tablet  Commonly known as:  SYNTHROID, LEVOTHROID      Take 75 mcg by mouth Daily.       lidocaine 5 %  Commonly known as:  LIDODERM      Place 1 patch on the skin as directed by provider Daily. Remove & Discard patch within 12 hours or as directed by MD       metroNIDAZOLE 250 MG tablet  Commonly known as:  FLAGYL      Take 1 tablet by mouth Every 6 (Six) Hours for 37 doses. Indications: Infection Within the Abdomen       midodrine 10 MG tablet  Commonly known as:  PROAMATINE      Take 1 tablet by mouth 3 (Three) Times a Day Before Meals.       pantoprazole 40 MG EC tablet  Commonly known as:  PROTONIX      Take 1 tablet by mouth Daily.       tamsulosin 0.4 MG capsule 24 hr capsule  Commonly known as:  FLOMAX      Take 1 capsule by mouth Every Night.       vitamin B-12 1000 MCG tablet  Commonly known as:  CYANOCOBALAMIN      Take 1  tablet by mouth Daily.       vitamin D3 125 MCG (5000 UT) capsule capsule      Take 5,000 Units by mouth Daily.              I did not stop or change the above medications.  Patient's medication list was updated to reflect medications they are currently taking including medication changes made by other providers.        Thanks,    Nadine Blake, DNP, APRN  06/11/2020         Dictated utilizing Dragon dictation

## 2020-06-15 ENCOUNTER — READMISSION MANAGEMENT (OUTPATIENT)
Dept: CALL CENTER | Facility: HOSPITAL | Age: 65
End: 2020-06-15

## 2020-06-15 ENCOUNTER — TELEPHONE (OUTPATIENT)
Dept: GASTROENTEROLOGY | Facility: CLINIC | Age: 65
End: 2020-06-15

## 2020-06-15 ENCOUNTER — TELEPHONE (OUTPATIENT)
Dept: CARDIOLOGY | Facility: CLINIC | Age: 65
End: 2020-06-15

## 2020-06-15 NOTE — TELEPHONE ENCOUNTER
----- Message from Alice Ambroiso sent at 6/12/2020  9:13 AM EDT -----  Regarding: RE: Schedule outpatient capsule  Pt has medicare does not need prior auth he meets medical guidelines both egd cls done in the last year and documented anemia  ----- Message -----  From: Helen Kraus  Sent: 6/11/2020  11:14 AM EDT  To: Alice Hebert  Subject: FW: Schedule outpatient capsule                  Could you please get this approved so it can be scheduled. Thank you!    ----- Message -----  From: Marely Velez  Sent: 6/9/2020   2:20 PM EDT  To: Helen Kraus  Subject: FW: Schedule outpatient capsule                  Can you schedule this please?  Thanks!  ----- Message -----  From: Yesenia Blancas MD  Sent: 6/4/2020   2:54 PM EDT  To: Marely Bello  Subject: Schedule outpatient capsule                      Patient needs patient capsule study due to melena, iron deficiency anemia

## 2020-06-15 NOTE — OUTREACH NOTE
CHF Week 2 Survey      Responses   [facility] patient discharged from?  [place]   Does the patient have one of the following disease processes/diagnoses(primary or secondary)?  CHF   Week 2 attempt successful?  No   Unsuccessful attempts  Attempt 1          Jenae Sidhu RN

## 2020-06-17 ENCOUNTER — READMISSION MANAGEMENT (OUTPATIENT)
Dept: CALL CENTER | Facility: HOSPITAL | Age: 65
End: 2020-06-17

## 2020-06-17 NOTE — OUTREACH NOTE
CHF Week 2 Survey      Responses   [facility] patient discharged from?  [place]   Does the patient have one of the following disease processes/diagnoses(primary or secondary)?  CHF   Week 2 attempt successful?  No   Unsuccessful attempts  Attempt 2          Ingrid Candelario LPN

## 2020-06-24 ENCOUNTER — READMISSION MANAGEMENT (OUTPATIENT)
Dept: CALL CENTER | Facility: HOSPITAL | Age: 65
End: 2020-06-24

## 2020-06-24 NOTE — OUTREACH NOTE
CHF Week 3 Survey      Responses   [facility] patient discharged from?  [place]   Does the patient have one of the following disease processes/diagnoses(primary or secondary)?  CHF   Week 3 attempt successful?  No   Unsuccessful attempts  Attempt 1          Jason Rehman RN

## 2020-06-26 ENCOUNTER — READMISSION MANAGEMENT (OUTPATIENT)
Dept: CALL CENTER | Facility: HOSPITAL | Age: 65
End: 2020-06-26

## 2020-06-26 NOTE — OUTREACH NOTE
CHF Week 3 Survey      Responses   [facility] [place] patient discharged from?  [place]   Does the patient have one of the following disease processes/diagnoses(primary or secondary)?  CHF   Week 3 attempt successful?  Yes   Call start time  1653   Call end time  1656   Discharge diagnosis  CHF, colonoscopy   Meds reviewed with patient/caregiver?  Yes   Is the patient taking all medications as directed (includes completed medication regime)?  Yes   Has the patient kept scheduled appointments due by today?  Yes   What is the Home health agency?   [facility]   Has home health visited the patient within 72 hours of discharge?  Yes   DME comments  Wears O2 most nights   Psychosocial issues?  No   Comments  Pt reports that he is still having SOA and anemia and arm pain. Still having weakness, he is working with PT thru . MD has done some labs to recheck H/H.    What is the patient's perception of their health status since discharge?  Same   Is the patient weighing daily?  No   Does the patient have scales?  Yes   Is the patient/caregiver able to teach back the hierarchy of who to call/visit for symptoms/problems? PCP, Specialist, Home health nurse, Urgent Care, ED, 911  Yes   CHF Week 3 call completed?  Yes          Jenae Sidhu RN

## 2020-07-06 ENCOUNTER — OFFICE VISIT (OUTPATIENT)
Dept: GASTROENTEROLOGY | Facility: CLINIC | Age: 65
End: 2020-07-06

## 2020-07-06 VITALS — BODY MASS INDEX: 25.06 KG/M2 | TEMPERATURE: 98.4 F | HEIGHT: 71 IN | WEIGHT: 179 LBS

## 2020-07-06 DIAGNOSIS — D50.9 IRON DEFICIENCY ANEMIA, UNSPECIFIED IRON DEFICIENCY ANEMIA TYPE: Primary | ICD-10-CM

## 2020-07-06 PROCEDURE — 99214 OFFICE O/P EST MOD 30 MIN: CPT | Performed by: INTERNAL MEDICINE

## 2020-07-06 NOTE — PROGRESS NOTES
Chief Complaint   Patient presents with   • Follow-up     hospital follow up   • Anemia   • Abdominal Pain        Carlito Mo is a  65 y.o. male here for a follow up visit for hospital follow-up since seen in June of this year.    HPI this 65-year-old white male patient of Dr. Florencia Caballero was seen in late May and June while hospitalized for melena and iron deficiency anemia.  He had undergone upper endoscopy on May 29 that revealed grade C esophagitis.  Colonoscopy performed on June 1 did reveal polyp that was removed.  Unfortunately, the path results were inconclusive for that polyp.  He states he continues to have black stools and complains of weakness and dyspnea on exertion.  He reports he has not had lab work done since discharge.  Review of records indicate lab work done on June 26 with a hemoglobin of 9.4 hematocrit of 31.5.  I will repeat his CBC as well as iron studies at this time.  He was also being considered for capsule enteroscopy and this will be scheduled accordingly.    Past Medical History:   Diagnosis Date   • Acquired hypothyroidism    • Acute congestive heart failure (CMS/HCC)    • Acute respiratory failure with hypoxia (CMS/HCC)    • Acute sinusitis    • SYLVAIN (acute kidney injury) (CMS/HCC)     on CKD   • Anemia    • Arthritis    • CAD (coronary artery disease)    • Cancer (CMS/HCC)     skin   • Chronic combined systolic and diastolic congestive heart failure (CMS/HCC)    • Chronic ischemic heart disease    • CKD (chronic kidney disease)    • COPD (chronic obstructive pulmonary disease) (CMS/HCC)    • Depression    • Diabetes mellitus type 2, uncontrolled, without complications    • Disease of thyroid gland    • Fatigue    • Frequent PVCs    • Heart attack (CMS/HCC)    • History of coronary artery disease     with remote history of bypass surgery in 2001   • History of transfusion    • Hyperglycemia    • Hyperlipidemia    • Hypertension    • Hypertensive encephalopathy    • Mental status  change     Acute   • YAW on CPAP    • Pleural effusion    • Pneumonia    • RBBB (right bundle branch block)    • Screening for prostate cancer 2011   • Stroke (CMS/[facility])    • TIA (transient ischemic attack)    • Type 2 diabetes mellitus (CMS/HCC)    • Urinary retention    • Vitamin D deficiency        Current Outpatient Medications   Medication Sig Dispense Refill   • aspirin 81 MG EC tablet Take 81 mg by mouth Every Morning.     • atorvastatin (LIPITOR) 40 MG tablet Take 40 mg by mouth Every Night.     • buPROPion XL (WELLBUTRIN XL) 150 MG 24 hr tablet Take 150 mg by mouth Daily.     • Cholecalciferol (VITAMIN D3) 5000 units capsule capsule Take 5,000 Units by mouth Daily.     • DOXYCYCLINE PO Take  by mouth 2 (two) times a day.     • ferrous sulfate 325 (65 FE) MG tablet Take 325 mg by mouth 3 (Three) Times a Day With Meals.     • folic acid (FOLVITE) 1 MG tablet Take 1 tablet by mouth Daily. 90 tablet 3   • Insulin Glargine (BASAGLAR KWIKPEN) 100 UNIT/ML injection pen Inject 8 Units under the skin into the appropriate area as directed Every Night. Patient says he does not take consistently     • insulin glulisine (APIDRA) 100 UNIT/ML injection Inject 2 Units under the skin into the appropriate area as directed 3 (Three) Times a Day Before Meals. Patient says he does not take consistently     • levothyroxine (SYNTHROID, LEVOTHROID) 75 MCG tablet Take 75 mcg by mouth Daily.     • lidocaine (LIDODERM) 5 % Place 1 patch on the skin as directed by provider Daily. Remove & Discard patch within 12 hours or as directed by MD 15 each 0   • midodrine (PROAMATINE) 10 MG tablet Take 1 tablet by mouth 3 (Three) Times a Day Before Meals. 30 tablet 0   • pantoprazole (PROTONIX) 40 MG EC tablet Take 1 tablet by mouth Daily. 30 tablet 0   • tamsulosin (FLOMAX) 0.4 MG capsule 24 hr capsule Take 1 capsule by mouth Every Night.     • vitamin B-12 (CYANOCOBALAMIN) 1000 MCG tablet Take 1 tablet by mouth Daily. 90 tablet 3     No  current facility-administered medications for this visit.        PRN Meds:.    Allergies   Allergen Reactions   • Fluoxetine Unknown - High Severity   • Prozac [Fluoxetine Hcl] Other (See Comments)     shaking       Social History     Socioeconomic History   • Marital status:      Spouse name: Lissette   • Number of children: 3   • Years of education: college   • Highest education level: Not on file   Occupational History     Employer: RETIRED   Tobacco Use   • Smoking status: Never Smoker   • Smokeless tobacco: Never Used   • Tobacco comment: daily caffeine   Substance and Sexual Activity   • Alcohol use: No   • Drug use: No   • Sexual activity: Defer       Family History   Problem Relation Age of Onset   • Diabetes Mother    • Hypertension Mother    • Macular degeneration Mother    • Alcohol abuse Father    • Cancer Father         lung,  age 65   • Heart disease Father    • Alcohol abuse Brother    • Cirrhosis Brother          age 50   • Liver cancer Brother 45   • Diabetes Son    • Kidney failure Son    • Autism Son        Review of Systems   Constitutional: Positive for fatigue. Negative for activity change, appetite change and unexpected weight change.   HENT: Negative for congestion, facial swelling, sore throat, trouble swallowing and voice change.    Eyes: Negative for photophobia and visual disturbance.   Respiratory: Positive for shortness of breath. Negative for cough and choking.    Cardiovascular: Negative for chest pain.   Gastrointestinal: Negative for abdominal distention, abdominal pain, anal bleeding, blood in stool, constipation, diarrhea, nausea, rectal pain and vomiting.        Melena   Endocrine: Negative for polyphagia.   Musculoskeletal: Negative for arthralgias, gait problem and joint swelling.   Skin: Negative for color change, pallor and rash.   Allergic/Immunologic: Negative for food allergies.   Neurological: Negative for speech difficulty and headaches.    Hematological: Does not bruise/bleed easily.   Psychiatric/Behavioral: Negative for agitation, confusion and sleep disturbance.       Vitals:    07/06/20 1510   Temp: 98.4 °F (36.9 °C)       Physical Exam   Constitutional: He is oriented to person, place, and time. He appears well-developed and well-nourished.   HENT:   Head: Normocephalic.   Mouth/Throat: Oropharynx is clear and moist.   Eyes: Conjunctivae and EOM are normal.   Neck: Normal range of motion.   Cardiovascular: Normal rate and regular rhythm.   Pulmonary/Chest: Breath sounds normal.   Abdominal: Soft. Bowel sounds are normal.   Musculoskeletal: Normal range of motion.   Neurological: He is alert and oriented to person, place, and time.   Skin: Skin is warm and dry.   Psychiatric: He has a normal mood and affect. His behavior is normal.       ASSESSMENT  #1 iron deficiency anemia: Work-up has included EGD and colonoscopy, capsule enteroscopy is pending.  #2 melena: Concern with ongoing blood loss      PLAN  CBC, iron studies  Schedule capsule enteroscopy  We will call patient with results and further recommendations      ICD-10-CM ICD-9-CM   1. Iron deficiency anemia, unspecified iron deficiency anemia type D50.9 280.9

## 2020-07-07 ENCOUNTER — READMISSION MANAGEMENT (OUTPATIENT)
Dept: CALL CENTER | Facility: HOSPITAL | Age: 65
End: 2020-07-07

## 2020-07-07 NOTE — OUTREACH NOTE
"CHF Week 4 Survey      Responses   [facility] [place] patient discharged from?  [place]   Does the patient have one of the following disease processes/diagnoses(primary or secondary)?  CHF   Week 4 attempt successful?  Yes   Call start time  1251   Call end time  1253   Discharge diagnosis  CHF, colonoscopy   Meds reviewed with patient/caregiver?  Yes   Is the patient taking all medications as directed (includes completed medication regime)?  Yes   Has the patient kept scheduled appointments due by today?  Yes   Is the patient still receiving Home Health Services?  No   Pulse Ox monitoring  None   What is the patient's perception of their health status since discharge?  Improving   Is the patient weighing daily?  No [Pt reports, \"I'm weighing every other day.\"]   Is the patient able to teach back Heart Failure Zones?  Yes   Is the patient able to teach back signs and symptoms of worsening condition? (i.e. weight gain, shortness of air, etc.)  Yes   Week 4 Call Completed?  Yes   Would the patient like one additional call?  No   Graduated  Yes   Did the patient feel the follow up calls were helpful during their recovery period?  Yes   Was the number of calls appropriate?  Yes          Ariane Izaguirre RN  "

## 2020-07-14 ENCOUNTER — LAB (OUTPATIENT)
Dept: LAB | Facility: HOSPITAL | Age: 65
End: 2020-07-14

## 2020-07-14 ENCOUNTER — OFFICE VISIT (OUTPATIENT)
Dept: ONCOLOGY | Facility: CLINIC | Age: 65
End: 2020-07-14

## 2020-07-14 VITALS
DIASTOLIC BLOOD PRESSURE: 78 MMHG | HEART RATE: 83 BPM | SYSTOLIC BLOOD PRESSURE: 148 MMHG | TEMPERATURE: 99.5 F | BODY MASS INDEX: 26.44 KG/M2 | HEIGHT: 71 IN | RESPIRATION RATE: 16 BRPM | WEIGHT: 188.9 LBS | OXYGEN SATURATION: 93 %

## 2020-07-14 DIAGNOSIS — D63.1 ANEMIA, CHRONIC RENAL FAILURE, STAGE 3 (MODERATE) (HCC): ICD-10-CM

## 2020-07-14 DIAGNOSIS — D63.1 ANEMIA, CHRONIC RENAL FAILURE, STAGE 3 (MODERATE) (HCC): Primary | ICD-10-CM

## 2020-07-14 DIAGNOSIS — N18.30 ANEMIA, CHRONIC RENAL FAILURE, STAGE 3 (MODERATE) (HCC): ICD-10-CM

## 2020-07-14 DIAGNOSIS — D50.9 IRON DEFICIENCY ANEMIA, UNSPECIFIED IRON DEFICIENCY ANEMIA TYPE: ICD-10-CM

## 2020-07-14 DIAGNOSIS — N18.30 ANEMIA, CHRONIC RENAL FAILURE, STAGE 3 (MODERATE) (HCC): Primary | ICD-10-CM

## 2020-07-14 LAB
ALBUMIN SERPL-MCNC: 3.7 G/DL (ref 3.5–5.2)
ALBUMIN/GLOB SERPL: 1.4 G/DL (ref 1.1–2.4)
ALP SERPL-CCNC: 78 U/L (ref 38–116)
ALT SERPL W P-5'-P-CCNC: 18 U/L (ref 0–41)
ANION GAP SERPL CALCULATED.3IONS-SCNC: 10.4 MMOL/L (ref 5–15)
AST SERPL-CCNC: 19 U/L (ref 0–40)
BASOPHILS # BLD AUTO: 0.01 10*3/MM3 (ref 0–0.2)
BASOPHILS NFR BLD AUTO: 0.1 % (ref 0–1.5)
BILIRUB SERPL-MCNC: 0.4 MG/DL (ref 0.2–1.2)
BUN SERPL-MCNC: 27 MG/DL (ref 6–20)
BUN/CREAT SERPL: 13.6 (ref 7.3–30)
CALCIUM SPEC-SCNC: 9.1 MG/DL (ref 8.5–10.2)
CHLORIDE SERPL-SCNC: 105 MMOL/L (ref 98–107)
CO2 SERPL-SCNC: 24.6 MMOL/L (ref 22–29)
CREAT SERPL-MCNC: 1.99 MG/DL (ref 0.7–1.3)
DEPRECATED RDW RBC AUTO: 43.5 FL (ref 37–54)
EOSINOPHIL # BLD AUTO: 0.23 10*3/MM3 (ref 0–0.4)
EOSINOPHIL NFR BLD AUTO: 3.2 % (ref 0.3–6.2)
ERYTHROCYTE [DISTWIDTH] IN BLOOD BY AUTOMATED COUNT: 13.2 % (ref 12.3–15.4)
FERRITIN SERPL-MCNC: 437.5 NG/ML (ref 30–400)
FOLATE SERPL-MCNC: >20 NG/ML (ref 4.78–24.2)
GFR SERPL CREATININE-BSD FRML MDRD: 34 ML/MIN/1.73
GLOBULIN UR ELPH-MCNC: 2.6 GM/DL (ref 1.8–3.5)
GLUCOSE SERPL-MCNC: 202 MG/DL (ref 74–124)
HCT VFR BLD AUTO: 31.8 % (ref 37.5–51)
HGB BLD-MCNC: 10.2 G/DL (ref 13–17.7)
IMM GRANULOCYTES # BLD AUTO: 0.05 10*3/MM3 (ref 0–0.05)
IMM GRANULOCYTES NFR BLD AUTO: 0.7 % (ref 0–0.5)
IRON 24H UR-MRATE: 52 MCG/DL (ref 59–158)
IRON SATN MFR SERPL: 19 % (ref 14–48)
LYMPHOCYTES # BLD AUTO: 0.93 10*3/MM3 (ref 0.7–3.1)
LYMPHOCYTES NFR BLD AUTO: 12.8 % (ref 19.6–45.3)
MCH RBC QN AUTO: 28.4 PG (ref 26.6–33)
MCHC RBC AUTO-ENTMCNC: 32.1 G/DL (ref 31.5–35.7)
MCV RBC AUTO: 88.6 FL (ref 79–97)
MONOCYTES # BLD AUTO: 0.51 10*3/MM3 (ref 0.1–0.9)
MONOCYTES NFR BLD AUTO: 7 % (ref 5–12)
NEUTROPHILS NFR BLD AUTO: 5.56 10*3/MM3 (ref 1.7–7)
NEUTROPHILS NFR BLD AUTO: 76.2 % (ref 42.7–76)
NRBC BLD AUTO-RTO: 0 /100 WBC (ref 0–0.2)
PLATELET # BLD AUTO: 215 10*3/MM3 (ref 140–450)
PMV BLD AUTO: 9.1 FL (ref 6–12)
POTASSIUM SERPL-SCNC: 4.3 MMOL/L (ref 3.5–4.7)
PROT SERPL-MCNC: 6.3 G/DL (ref 6.3–8)
RBC # BLD AUTO: 3.59 10*6/MM3 (ref 4.14–5.8)
SODIUM SERPL-SCNC: 140 MMOL/L (ref 134–145)
TIBC SERPL-MCNC: 267 MCG/DL (ref 249–505)
TRANSFERRIN SERPL-MCNC: 191 MG/DL (ref 200–360)
VIT B12 BLD-MCNC: 1636 PG/ML (ref 211–946)
WBC # BLD AUTO: 7.29 10*3/MM3 (ref 3.4–10.8)

## 2020-07-14 PROCEDURE — 84466 ASSAY OF TRANSFERRIN: CPT

## 2020-07-14 PROCEDURE — 99214 OFFICE O/P EST MOD 30 MIN: CPT | Performed by: INTERNAL MEDICINE

## 2020-07-14 PROCEDURE — 82607 VITAMIN B-12: CPT | Performed by: INTERNAL MEDICINE

## 2020-07-14 PROCEDURE — 36415 COLL VENOUS BLD VENIPUNCTURE: CPT

## 2020-07-14 PROCEDURE — 82746 ASSAY OF FOLIC ACID SERUM: CPT | Performed by: INTERNAL MEDICINE

## 2020-07-14 PROCEDURE — 82728 ASSAY OF FERRITIN: CPT

## 2020-07-14 PROCEDURE — 85025 COMPLETE CBC W/AUTO DIFF WBC: CPT

## 2020-07-14 PROCEDURE — 80053 COMPREHEN METABOLIC PANEL: CPT

## 2020-07-14 PROCEDURE — 83540 ASSAY OF IRON: CPT

## 2020-07-22 ENCOUNTER — OFFICE VISIT (OUTPATIENT)
Dept: CARDIOLOGY | Facility: CLINIC | Age: 65
End: 2020-07-22

## 2020-07-22 VITALS
DIASTOLIC BLOOD PRESSURE: 80 MMHG | HEART RATE: 77 BPM | BODY MASS INDEX: 26.32 KG/M2 | OXYGEN SATURATION: 98 % | HEIGHT: 71 IN | WEIGHT: 188 LBS | SYSTOLIC BLOOD PRESSURE: 140 MMHG

## 2020-07-22 DIAGNOSIS — Z99.89 OSA ON CPAP: ICD-10-CM

## 2020-07-22 DIAGNOSIS — E11.3399 TYPE 2 DIABETES MELLITUS WITH MODERATE NONPROLIFERATIVE RETINOPATHY WITHOUT MACULAR EDEMA, WITH LONG-TERM CURRENT USE OF INSULIN, UNSPECIFIED LATERALITY (HCC): ICD-10-CM

## 2020-07-22 DIAGNOSIS — Z79.4 TYPE 2 DIABETES MELLITUS WITH MODERATE NONPROLIFERATIVE RETINOPATHY WITHOUT MACULAR EDEMA, WITH LONG-TERM CURRENT USE OF INSULIN, UNSPECIFIED LATERALITY (HCC): ICD-10-CM

## 2020-07-22 DIAGNOSIS — I50.42 CHRONIC COMBINED SYSTOLIC AND DIASTOLIC CONGESTIVE HEART FAILURE (HCC): Primary | ICD-10-CM

## 2020-07-22 DIAGNOSIS — I25.10 CORONARY ARTERY DISEASE INVOLVING NATIVE CORONARY ARTERY OF NATIVE HEART WITHOUT ANGINA PECTORIS: ICD-10-CM

## 2020-07-22 DIAGNOSIS — I10 ESSENTIAL HYPERTENSION: ICD-10-CM

## 2020-07-22 DIAGNOSIS — G47.33 OSA ON CPAP: ICD-10-CM

## 2020-07-22 DIAGNOSIS — I63.9 RECURRENT STROKES (HCC): ICD-10-CM

## 2020-07-22 DIAGNOSIS — G90.3 NEUROGENIC ORTHOSTATIC HYPOTENSION (HCC): ICD-10-CM

## 2020-07-22 DIAGNOSIS — E78.49 OTHER HYPERLIPIDEMIA: ICD-10-CM

## 2020-07-22 PROBLEM — I48.91 ATRIAL FIBRILLATION (HCC): Status: ACTIVE | Noted: 2020-06-26

## 2020-07-22 PROCEDURE — 99214 OFFICE O/P EST MOD 30 MIN: CPT | Performed by: INTERNAL MEDICINE

## 2020-07-22 PROCEDURE — 93000 ELECTROCARDIOGRAM COMPLETE: CPT | Performed by: INTERNAL MEDICINE

## 2020-07-22 NOTE — PROGRESS NOTES
PATIENTINFORMATION    Date of Office Visit: 20  Encounter Provider: Amber Murguia MD  Place of Service: [facility] CARDIOLOGY  Patient Name: Carlito Mo  : 1955    Subjective:         Patient ID: Carlito Mo is a 65 y.o. male.      History of Present Illness    Mr. Carlito Mo is a male patient with a history of CAD/MI (CABG ), HTN, hyperlipidemia, type 2 diabetes mellitus, anemia, chronic kidney disease, and basal cell carcinoma.  I first saw him in 2017 when he was admitted with altered mental status.  He was diagnosed with multiple small acute infarcts.  His echocardiogram from 2017 showed mild left ventricular hypertrophy, mild left atrial enlargement, negative bubble study, normal LV systolic function with an ejection fraction of 68%.  Transesophageal echo in 2017 showed that his LV function was mildly low with an ejection fraction of 45% with focal wall motion abnormalities primarily in the septum and anterior wall and apex.  Small patent foramen ovale was noted there was no significant valvular heart disease or cardiac source of embolus minus the small PFO.  Holter monitor showed a couple of short bursts of SVT but no sustained arrhythmia.      He came back to the hospital in 2017 again with altered mental status.  At this time he had an echocardiogram which shows ejection fraction was 63%, grade 1 diastolic dysfunction and no significant valvular heart disease.  He wore a Zio patch which showed PACs, PVCs and nonsustained SVT.     He was back in the hospital in May 2018 with acute mental status changes and a blood pressure of 212/109.  He was noted at that time to have a chronically elevated troponin without acute EKG changes.     Sadly he was hospitalized again in 2018 after he was hypoxic and persistently lethargic after a colonoscopy.  During that admission he did have an echocardiogram which revealed his LV  function had decreased and his ejection fraction was now 46%.  There was grade II diastolic dysfunction and mildly elevated pulmonary pressures with a left pleural effusion.  He was treated for pneumonia.  He was also diuresed and seemed to be back to his baseline although he was discharged with 2 liters of oxygen at night.      He was hospitalized in June 2020 with progressive weakness and acute kidney injury with anemia.  He required some fluid resuscitation.  EGD showed  non-showed nonbleeding gastritis he had pronounced orthostatic hypotension and his medications were discontinued.  He followed up with [facility] in June 2020 he was not having any bleeding at that time.  He was orthostatic in the office at that visit he was on Midrin.  He is off all hypertensive medications.  He has been following with Dr. Louie.  Systolic murmur at the right upper sternal border.      Unfortunately he is continuing to have melena.  He is still orthostatic.  He feels dizzy when he stands up.  He denies any chest pain.  He says he is not exercising as much as he was.  He was walking with his son in preparation for a trip to [place] but since that was canceled he has not been regularly exercising.  He has some occasional shortness of breath.    Review of Systems   Constitution: Positive for malaise/fatigue. Negative for fever, weight gain and weight loss.   HENT: Negative for ear pain, hearing loss, nosebleeds and sore throat.    Eyes: Positive for blurred vision, vision loss in left eye and vision loss in right eye. Negative for double vision and pain.   Cardiovascular:        See history of present illness.   Respiratory: Negative for cough, shortness of breath, sleep disturbances due to breathing, snoring and wheezing.    Endocrine: Negative for cold intolerance, heat intolerance and polyuria.   Skin: Negative for itching, poor wound healing and rash.   Musculoskeletal: Negative for joint pain, joint swelling and myalgias.  "  Gastrointestinal: Negative for abdominal pain, diarrhea, hematochezia, nausea and vomiting.   Genitourinary: Negative for hematuria and hesitancy.   Neurological: Positive for light-headedness. Negative for numbness, paresthesias and seizures.   Psychiatric/Behavioral: Positive for depression. The patient is not nervous/anxious.            ECG 12 Lead  Date/Time: 7/22/2020 3:25 PM  Performed by: Amber Murguia MD  Authorized by: Amber Murguia MD   Comparison: compared with previous ECG from 6/11/2020  Similar to previous ECG  Rhythm: sinus rhythm  BPM: 72  Conduction: right bundle branch block    Clinical impression: abnormal EKG               Objective:     /80 (BP Location: Left arm)   Pulse 77   Ht 180.3 cm (71\")   Wt 85.3 kg (188 lb)   SpO2 98%   BMI 26.22 kg/m²  Body mass index is 26.22 kg/m².     Physical Exam   Constitutional: He appears well-developed.   HENT:   Head: Normocephalic and atraumatic.   Eyes: Pupils are equal, round, and reactive to light. Conjunctivae and lids are normal. Lids are everted and swept, no foreign bodies found.   Neck: Normal range of motion. No JVD present. Carotid bruit is not present. No tracheal deviation present. No thyroid mass present.   Cardiovascular: Normal rate, regular rhythm and normal heart sounds.   Pulses:       Dorsalis pedis pulses are 2+ on the right side, and 2+ on the left side.   Mild bilateral nonpitting edema   Pulmonary/Chest: Effort normal and breath sounds normal.   Abdominal: Normal appearance and bowel sounds are normal.   Musculoskeletal: Normal range of motion.   Neurological: He is alert. He has normal strength.   Skin: Skin is warm, dry and intact.   Psychiatric: He has a normal mood and affect. His behavior is normal.   Vitals reviewed.      Lab Review: I reviewed labs from July 2020      Assessment/Plan:       1.  Chronic systolic and diastolic heart failure.  Unfortunately he is not clear on his medications.  He says he is " taking Lasix but he does not know how much it is.  He says his medications just come in a box and he takes them.  I know he is off of beta-blockers and angiotensin receptor blockers because of his significant orthostatic hypotension.  I am not going to try to resume any today.  Maybe we can add back a beta-blocker if he remains stable.    2.  Coronary artery disease.  He had a bypass surgery in 2001.  I am going to stop his aspirin since he is still having melena and a recent EGD showed gastritis.  3.  History of stroke.  Transesophageal echocardiogram did not show cardiac source of embolus.  Zio patch did not show atrial fibrillation.  His neurologist recommended anticoagulation since he had repeat strokes on aspirin and Plavix.  He was on warfarin but that has been discontinued due to his GI bleeding.  4.  Hypertension as per #1.  He believes he is no longer taking Midrin but he is not sure.  5.  Hyperlipidemia.  Atorvastatin is listed in his med list.  6.  Chronic kidney disease.  Baseline creatinine is about 1.6  7.  Right bundle branch block  8.  Chronically elevated troponin  9.  GI bleed with gastritis but continuing to have melena.  Follows with Dr. Louie  10.  Diabetes on insulin  11.  Obstructive sleep apnea on CPAP  12.  Orthostatic hypotension likely secondary to neuropathy from diabetes.    I think we does need to keep a really close eye on him and hopefully get him back on at least a beta-blocker.  I will have him follow-up with Massiel next month.    No orders of the defined types were placed in this encounter.       Discharge Medications           Accurate as of July 22, 2020  1:58 PM. If you have any questions, ask your nurse or doctor.               Continue These Medications      Instructions Start Date   Apidra 100 UNIT/ML injection  Generic drug:  insulin glulisine   2 Units, Subcutaneous, 3 Times Daily Before Meals, Patient says he does not take consistently      aspirin 81 MG EC tablet   81  mg, Oral, Every Morning      atorvastatin 40 MG tablet  Commonly known as:  LIPITOR   40 mg, Oral, Nightly      buPROPion  MG 24 hr tablet  Commonly known as:  WELLBUTRIN XL   150 mg, Oral, Daily      ferrous sulfate 325 (65 FE) MG tablet   325 mg, Oral, 3 Times Daily With Meals      folic acid 1 MG tablet  Commonly known as:  FOLVITE   1 mg, Oral, Daily      Insulin Glargine 100 UNIT/ML injection pen  Commonly known as:  BASAGLAR KWIKPEN   8 Units, Subcutaneous, Nightly, Patient says he does not take consistently      levothyroxine 75 MCG tablet  Commonly known as:  SYNTHROID, LEVOTHROID   75 mcg, Oral, Daily      pantoprazole 40 MG EC tablet  Commonly known as:  PROTONIX   40 mg, Oral, Daily      tamsulosin 0.4 MG capsule 24 hr capsule  Commonly known as:  FLOMAX   1 capsule, Oral, Nightly      vitamin B-12 1000 MCG tablet  Commonly known as:  CYANOCOBALAMIN   1,000 mcg, Oral, Daily      vitamin D3 125 MCG (5000 UT) capsule capsule   5,000 Units, Oral, Daily         Stop These Medications    DOXYCYCLINE PO  Stopped by:  Amber Murguia MD     lidocaine 5 %  Commonly known as:  LIDODERM  Stopped by:  Amber Murguia MD     midodrine 10 MG tablet  Commonly known as:  PROAMATINE  Stopped by:  MD Amber Padilla MD  07/22/20  13:58

## 2020-07-23 ENCOUNTER — OFFICE VISIT (OUTPATIENT)
Dept: SLEEP MEDICINE | Facility: HOSPITAL | Age: 65
End: 2020-07-23

## 2020-07-23 VITALS
BODY MASS INDEX: 26.4 KG/M2 | HEART RATE: 80 BPM | OXYGEN SATURATION: 95 % | HEIGHT: 71 IN | DIASTOLIC BLOOD PRESSURE: 79 MMHG | SYSTOLIC BLOOD PRESSURE: 143 MMHG | WEIGHT: 188.6 LBS

## 2020-07-23 DIAGNOSIS — I63.9 RECURRENT STROKES (HCC): ICD-10-CM

## 2020-07-23 DIAGNOSIS — Z79.4 TYPE 2 DIABETES MELLITUS WITH MODERATE NONPROLIFERATIVE RETINOPATHY WITHOUT MACULAR EDEMA, WITH LONG-TERM CURRENT USE OF INSULIN, UNSPECIFIED LATERALITY (HCC): ICD-10-CM

## 2020-07-23 DIAGNOSIS — Z99.89 OSA ON CPAP: Primary | ICD-10-CM

## 2020-07-23 DIAGNOSIS — E11.3399 TYPE 2 DIABETES MELLITUS WITH MODERATE NONPROLIFERATIVE RETINOPATHY WITHOUT MACULAR EDEMA, WITH LONG-TERM CURRENT USE OF INSULIN, UNSPECIFIED LATERALITY (HCC): ICD-10-CM

## 2020-07-23 DIAGNOSIS — G47.34 SLEEP RELATED HYPOXIA: ICD-10-CM

## 2020-07-23 DIAGNOSIS — G47.33 OSA ON CPAP: Primary | ICD-10-CM

## 2020-07-23 DIAGNOSIS — N18.30 CKD (CHRONIC KIDNEY DISEASE) STAGE 3, GFR 30-59 ML/MIN (HCC): ICD-10-CM

## 2020-07-23 PROCEDURE — 99212 OFFICE O/P EST SF 10 MIN: CPT | Performed by: INTERNAL MEDICINE

## 2020-07-23 PROCEDURE — G0463 HOSPITAL OUTPT CLINIC VISIT: HCPCS

## 2020-07-23 NOTE — PROGRESS NOTES
[facility]  [address] [facility] [address] [address]  Phone   Fax       SLEEP CLINIC FOLLOW UP PROGRESS NOTE.    Carlito MEHTA Chepe  1955  65 y.o.  male      PCP: Florencia Caballero MD      Date of visit: 7/23/2020    Chief Complaint   Patient presents with   • Sleep Apnea       INTERM HISTORY:  This is a 65 y.o. years old patient who has a history of sleep apnea and is on CPAP.  Unfortunately patient is not able to use the CPAP.  He uses oxygen at nighttime at 2 L.  He has multiple medical problems which includes recurrent strokes, chronic kidney disease and diabetes with ophthalmic complications and neuropathy.  Quite recently patient was discharged from the hospital after having a GI bleed.    Sleep schedule  Normally goes to bed at 12 midnight  Wakes up at 8 AM  Do you feel refreshed after waking up ?:  No      Patient is unable to use the CPAP  He is using oxygen at 2 L at nighttime  DME: [facility]      Past Medical History:   Diagnosis Date   • Acquired hypothyroidism    • Acute congestive heart failure (CMS/HCC)    • Acute respiratory failure with hypoxia (CMS/HCC)    • Acute sinusitis    • SYLVAIN (acute kidney injury) (CMS/HCC)     on CKD   • Anemia    • Arthritis    • CAD (coronary artery disease)    • Cancer (CMS/HCC)     skin   • Chronic combined systolic and diastolic congestive heart failure (CMS/HCC)    • Chronic ischemic heart disease    • CKD (chronic kidney disease)    • COPD (chronic obstructive pulmonary disease) (CMS/HCC)    • Depression    • Diabetes mellitus type 2, uncontrolled, without complications    • Disease of thyroid gland    • Fatigue    • Frequent PVCs    • Heart attack (CMS/HCC)    • History of coronary artery disease     with remote history of bypass surgery in 2001   • History of transfusion    • Hyperglycemia    • Hyperlipidemia    • Hypertension    • Hypertensive encephalopathy    • Mental status change     Acute   • YAW on CPAP     • Pleural effusion    • Pneumonia    • RBBB (right bundle branch block)    • Screening for prostate cancer 2011   • Stroke (CMS/HCC)    • TIA (transient ischemic attack)    • Type 2 diabetes mellitus (CMS/HCC)    • Urinary retention    • Vitamin D deficiency        MEDICATIONS: reviewed by me    Current Outpatient Medications:   •  atorvastatin (LIPITOR) 40 MG tablet, Take 40 mg by mouth Every Night., Disp: , Rfl:   •  buPROPion XL (WELLBUTRIN XL) 150 MG 24 hr tablet, Take 150 mg by mouth Daily., Disp: , Rfl:   •  Cholecalciferol (VITAMIN D3) 5000 units capsule capsule, Take 5,000 Units by mouth Daily., Disp: , Rfl:   •  ferrous sulfate 325 (65 FE) MG tablet, Take 325 mg by mouth 3 (Three) Times a Day With Meals., Disp: , Rfl:   •  folic acid (FOLVITE) 1 MG tablet, Take 1 tablet by mouth Daily., Disp: 90 tablet, Rfl: 3  •  Insulin Glargine (BASAGLAR KWIKPEN) 100 UNIT/ML injection pen, Inject 8 Units under the skin into the appropriate area as directed Every Night. Patient says he does not take consistently, Disp: , Rfl:   •  insulin glulisine (APIDRA) 100 UNIT/ML injection, Inject 2 Units under the skin into the appropriate area as directed 3 (Three) Times a Day Before Meals. Patient says he does not take consistently, Disp: , Rfl:   •  levothyroxine (SYNTHROID, LEVOTHROID) 75 MCG tablet, Take 75 mcg by mouth Daily., Disp: , Rfl:   •  pantoprazole (PROTONIX) 40 MG EC tablet, Take 1 tablet by mouth Daily., Disp: 30 tablet, Rfl: 0  •  tamsulosin (FLOMAX) 0.4 MG capsule 24 hr capsule, Take 1 capsule by mouth Every Night., Disp: , Rfl:   •  vitamin B-12 (CYANOCOBALAMIN) 1000 MCG tablet, Take 1 tablet by mouth Daily., Disp: 90 tablet, Rfl: 3    Allergies   Allergen Reactions   • Fluoxetine Unknown - High Severity   • Prozac [Fluoxetine Hcl] Other (See Comments)     shaking    reviewed by me    SOCIAL, FAMILY HISTORY: Medical records are reviewed and noted by me.  History of smoking no  History of alcohol use  "no  Caffeine use 2-3    REVIEW OF SYSTEMS:   Jewell Sleepiness Scale :Total score: 4   Snoring: No      PHYSICAL EXAMINATION:  Vitals:    07/23/20 1424   BP: 143/79   Pulse: 80   SpO2: 95%   Weight: 85.5 kg (188 lb 9.6 oz)   Height: 180.3 cm (71\")    Body mass index is 26.3 kg/m².    Katrin patient looks weak and pale uses a walker for ambulation.  He looks chronically sick.  HEENT: pupils are round equal and reacting to light and accommodation, nasal passage is clear, no nasal polyps, no lymphadenopathy, throat is clear,  RESPRATORY SYSTEM: Breath sounds are equal on both sides and are diminished, no wheezes or crackles  CARDIOVASULAR SYSTEM: Heart rate is regular without murmur  ABDOMEN: Soft, no ascites, no hepatosplenomegaly.  EXTREMITIES: No cyanosis, clubbing or edema       ASSESSMENT AND PLAN:  · Obstructive sleep apnea, patient is unable to use the CPAP  · Sleep-related hypoxia, I have talked to the patient at great length and instructed him to continue to use oxygen as it would benefit him.  · Chronic kidney disease  · Multiple strokes  · Cognitive impairment  · Return to clinic as needed for follow-up        Albert Wing MD, [facility]  Sleep Medicine.(Board-certified)  [facility]  Medical Director for [facility]  7/23/2020               "

## 2020-08-26 ENCOUNTER — OFFICE VISIT (OUTPATIENT)
Dept: CARDIOLOGY | Facility: CLINIC | Age: 65
End: 2020-08-26

## 2020-08-26 VITALS
SYSTOLIC BLOOD PRESSURE: 118 MMHG | HEIGHT: 71 IN | WEIGHT: 188.4 LBS | HEART RATE: 75 BPM | BODY MASS INDEX: 26.38 KG/M2 | DIASTOLIC BLOOD PRESSURE: 62 MMHG

## 2020-08-26 DIAGNOSIS — I25.10 CORONARY ARTERY DISEASE INVOLVING NATIVE CORONARY ARTERY OF NATIVE HEART WITHOUT ANGINA PECTORIS: Primary | ICD-10-CM

## 2020-08-26 DIAGNOSIS — I63.9 RECURRENT STROKES (HCC): ICD-10-CM

## 2020-08-26 DIAGNOSIS — G47.34 SLEEP RELATED HYPOXIA: ICD-10-CM

## 2020-08-26 DIAGNOSIS — I50.32 CHRONIC DIASTOLIC HEART FAILURE (HCC): ICD-10-CM

## 2020-08-26 DIAGNOSIS — G47.33 OSA (OBSTRUCTIVE SLEEP APNEA): ICD-10-CM

## 2020-08-26 DIAGNOSIS — I10 ESSENTIAL HYPERTENSION: ICD-10-CM

## 2020-08-26 PROCEDURE — 93000 ELECTROCARDIOGRAM COMPLETE: CPT | Performed by: NURSE PRACTITIONER

## 2020-08-26 PROCEDURE — 99214 OFFICE O/P EST MOD 30 MIN: CPT | Performed by: NURSE PRACTITIONER

## 2020-08-26 RX ORDER — ASPIRIN 81 MG/1
81 TABLET ORAL DAILY
COMMUNITY
End: 2022-09-19 | Stop reason: HOSPADM

## 2020-08-26 RX ORDER — LISINOPRIL 20 MG/1
20 TABLET ORAL DAILY
COMMUNITY
Start: 2020-08-06 | End: 2020-10-27 | Stop reason: SDUPTHER

## 2020-08-26 RX ORDER — AMLODIPINE BESYLATE 5 MG/1
5 TABLET ORAL DAILY
COMMUNITY
Start: 2020-07-30 | End: 2020-08-26

## 2020-08-26 RX ORDER — METOPROLOL SUCCINATE 25 MG/1
25 TABLET, EXTENDED RELEASE ORAL DAILY
Qty: 30 TABLET | Refills: 0 | Status: SHIPPED | OUTPATIENT
Start: 2020-08-26 | End: 2020-10-27 | Stop reason: SDUPTHER

## 2020-08-26 RX ORDER — FUROSEMIDE 20 MG/1
20 TABLET ORAL DAILY
Status: ON HOLD | COMMUNITY
Start: 2020-08-06 | End: 2020-09-15

## 2020-08-26 NOTE — PROGRESS NOTES
Date of Office Visit: 2020  Encounter Provider: Nadine Blake, JULIO C, APRN  Place of Service: [facility] CARDIOLOGY  Patient Name: Carlito Mo  :1955        Subjective:     Chief Complaint:  Follow-up, heart failure, orthostatic hypotension      History of Present Illness:  Carlito Mo is a 65 y.o. male patient of Dr. Murguia.  I am seeing this patient in the office today and I have reviewed his records.    Patient has a history of   Patient has a history of coronary artery disease, cardiomyopathy, congestive heart failure, respiratory failure, chronic kidney disease, hypothyroidism, type 2 diabetes, the static hypotension, history of stroke while on dual antiplatelet therapy with warfarin recommended.     Patient was first seen by Dr. Murguia 2017 when he was admitted with altered mental status.  He was diagnosed with multiple small acute infarcts.  Echocardiogram 2017 showed mild left ventricular hypertrophy, mild left atrial enlargement, negative bubble study, normal LV systolic function with an ejection fraction of 60%.  Transesophageal echo 2017 showed that his LV function was mildly low with an ejection fraction of 45% with focal wall motion abnormalities primarily in the septum and anterior wall and apex.  Small patent foramen ovale was noted and no significant valvular heart disease or cardiac source of emboli minus a small PFO were seen.  Holter monitor showed a couple of short bursts of SVT but no sustained arrhythmia.  He was seen back in the hospital 2017 in May 2018 with altered mental status, as well as heart failure exacerbations.  Last echocardiogram 2018 showed mildly decreased left ventricular systolic function with EF of 43%.  Severe hypokinesis of the distal anterior septum and anterior apex noted.  Mild mitral valve regurgitation was present, left atrial cavity size was dilated, right ventricular cavity was mildly  dilated, with trivial pericardial effusion. Patient presented to the ER 4/23/2019 complaining of intermittent chest discomfort overnight.  Troponin was elevated 0.045 and 0.044.  Troponins have been elevated in this range prior admissions as well.  He had nitroglycerin paste which helped with the pain.  Prior to this chest pain episode he had not had any other episodes of chest pain.  Nuclear stress test was ordered which showed medium sized infarct in the apex without significant ischemia.  Beta-blockade was restarted.  Cardiac catheterization to be considered if chest pain recurs or becomes persistent.  Patient presented to the hospital 7/2/2019 reporting increased leg swelling and shortness of breath over the previous week.  Chest x-ray showed increased and previously noted pleural effusions.  BNP was elevated above baseline.  Troponin was negative and EKG did not show acute changes.  Patient was admitted for IV diuresis and was then transitioned to oral diuretics.  He continued to improve with creatinine elevated though not beyond previous baseline range.  He was discharged 7/6/2019.  When patient was seen in office 3/2020 by JI he denied anginal symptoms.  He was being followed by [facility] for his chronic heart failure.  He was hospitalized 5/2020 with SYLVAIN and anemia.  Echo 5/27/2020 showed normal LV systolic functionwith EF of 60%, hypokinesis of the distal anteroseptal, distal inferior septal, and apex, mild concentric LVH, mildly dilated left atrial cavity.  EGD showed nonbleeding gastritis and he had pronounced orthostatic hypotension.  Antihypertensives were stopped and he was placed on Midodrine.  He was seen back in the office 7/2020 by Dr. Murguia at which point he was still orthostatic and continuing to have melena.  Patient was instructed to follow-up with APRN in 1 month and consider resuming low-dose beta-blocker if he remains stable.      Patient presents to office today for  follow-up appointment.  Patient reports he is doing okay overall since last visit, about the same.  He is not sure how he started taking amlodipine again, does not recall any high blood pressure readings recently, and there is some question whether he ever stopped it after discharge.  He continues to get some dizziness with position changes, about the same as last visit.  He is mildly orthostatic in the office today.  He denies any chest pain/discomfort.  His chronic shortness of breath is unchanged.  Denies palpitations or racing heartbeat sensation.  He gets some occasional lower extremity swelling and uses compression socks as needed though it was recommended to use them daily.  He reports weight has remaining stable in 180s.  He continues to have a lot of fatigue.  He recently saw sleep medicine and he was reported that he could not use CPAP regularly so he would use oxygen when not using CPAP machine.  He reports that he does use his CPAP sometimes though.            Past Medical History:   Diagnosis Date   • Acquired hypothyroidism    • Acute congestive heart failure (CMS/HCC)    • Acute respiratory failure with hypoxia (CMS/HCC)    • Acute sinusitis    • SYLVAIN (acute kidney injury) (CMS/[facility])     on CKD   • Anemia    • Arthritis    • CAD (coronary artery disease)    • Cancer (CMS/HCC)     skin   • Chronic combined systolic and diastolic congestive heart failure (CMS/HCC)    • Chronic ischemic heart disease    • CKD (chronic kidney disease)    • COPD (chronic obstructive pulmonary disease) (CMS/HCC)    • Depression    • Diabetes mellitus type 2, uncontrolled, without complications    • Disease of thyroid gland    • Fatigue    • Frequent PVCs    • Heart attack (CMS/HCC)    • History of coronary artery disease     with remote history of bypass surgery in 2001   • History of transfusion    • Hyperglycemia    • Hyperlipidemia    • Hypertension    • Hypertensive encephalopathy    • Mental status change     Acute   •  YAW on CPAP    • Pleural effusion    • Pneumonia    • RBBB (right bundle branch block)    • Screening for prostate cancer 2011   • Stroke (CMS/HCC)    • TIA (transient ischemic attack)    • Type 2 diabetes mellitus (CMS/HCC)    • Urinary retention    • Vitamin D deficiency      Past Surgical History:   Procedure Laterality Date   • APPENDECTOMY N/A 02/14/2016    Dr. Alexey Dodson   • COLONOSCOPY      over 20 years ago.  no polyps    • COLONOSCOPY N/A 9/18/2018    Procedure: COLONOSCOPY;  Surgeon: Jose Enrique Louie MD;  Location: [facility] ENDOSCOPY;  Service: Gastroenterology   • COLONOSCOPY N/A 9/19/2018    IH, EH, polyps (TAs w/low grade dysplasia)   • COLONOSCOPY N/A 6/1/2020    Procedure: COLONOSCOPY;  Surgeon: Jose Enrique Louie MD;  Location: [facility] ENDOSCOPY;  Service: Gastroenterology;  Laterality: N/A;  Pre op-Anemia, Melena, History of Polyps  Post op-To Cecum/TI, Polyp, Poor Prep   • CORONARY ARTERY BYPASS GRAFT  11/2001   • ENDOSCOPY N/A 5/29/2020    Procedure: ESOPHAGOGASTRODUODENOSCOPY with biopsies;  Surgeon: Amanda Lovelace MD;  Location: [facility] ENDOSCOPY;  Service: Gastroenterology;  Laterality: N/A;  pre-anemia, dark stools  post-esophagitis, hiatal hernia   • HERNIA REPAIR Left 12/05/2019   • TONSILLECTOMY     • VASECTOMY       Outpatient Medications Prior to Visit   Medication Sig Dispense Refill   • aspirin 81 MG EC tablet Take 81 mg by mouth Daily.     • atorvastatin (LIPITOR) 40 MG tablet Take 40 mg by mouth Every Night.     • buPROPion XL (WELLBUTRIN XL) 150 MG 24 hr tablet Take 150 mg by mouth Daily.     • Cholecalciferol (VITAMIN D3) 5000 units capsule capsule Take 5,000 Units by mouth Daily.     • ferrous sulfate 325 (65 FE) MG tablet Take 325 mg by mouth 3 (Three) Times a Day With Meals.     • folic acid (FOLVITE) 1 MG tablet Take 1 tablet by mouth Daily. 90 tablet 3   • furosemide (LASIX) 20 MG tablet Take 20 mg by mouth Daily.     • Insulin Glargine (BASAGLAR KWIKPEN) 100  UNIT/ML injection pen Inject 8 Units under the skin into the appropriate area as directed Every Night. Patient says he does not take consistently     • insulin glulisine (APIDRA) 100 UNIT/ML injection Inject 2 Units under the skin into the appropriate area as directed 3 (Three) Times a Day Before Meals. Patient says he does not take consistently     • levothyroxine (SYNTHROID, LEVOTHROID) 75 MCG tablet Take 75 mcg by mouth Daily.     • lisinopril (PRINIVIL,ZESTRIL) 20 MG tablet Take 20 mg by mouth Daily.     • tamsulosin (FLOMAX) 0.4 MG capsule 24 hr capsule Take 1 capsule by mouth Every Night.     • vitamin B-12 (CYANOCOBALAMIN) 1000 MCG tablet Take 1 tablet by mouth Daily. 90 tablet 3   • amLODIPine (NORVASC) 5 MG tablet Take 5 mg by mouth Daily.     • pantoprazole (PROTONIX) 40 MG EC tablet Take 1 tablet by mouth Daily. 30 tablet 0     No facility-administered medications prior to visit.        Allergies as of 2020 - Reviewed 2020   Allergen Reaction Noted   • Fluoxetine Unknown - High Severity 2018   • Prozac [fluoxetine hcl] Other (See Comments) 2015     Social History     Socioeconomic History   • Marital status:      Spouse name: Lissette   • Number of children: 3   • Years of education: college   • Highest education level: Not on file   Occupational History     Employer: RETIRED   Tobacco Use   • Smoking status: Never Smoker   • Smokeless tobacco: Never Used   • Tobacco comment: daily caffeine   Substance and Sexual Activity   • Alcohol use: No   • Drug use: No   • Sexual activity: Defer     Family History   Problem Relation Age of Onset   • Diabetes Mother    • Hypertension Mother    • Macular degeneration Mother    • Alcohol abuse Father    • Cancer Father         lung,  age 65   • Heart disease Father    • Alcohol abuse Brother    • Cirrhosis Brother          age 50   • Liver cancer Brother 45   • Diabetes Son    • Kidney failure Son    • Autism Son        Review  "of Systems   Constitution: Positive for malaise/fatigue. Negative for chills, fever, weight gain and weight loss.   HENT: Negative for ear pain, hearing loss, nosebleeds and sore throat.    Eyes: Positive for blurred vision (chronic, not acute). Negative for double vision and redness.   Cardiovascular:        See hpi   Respiratory: Negative for cough, shortness of breath, snoring and wheezing.    Endocrine: Positive for cold intolerance. Negative for heat intolerance.   Skin: Negative for itching, rash and suspicious lesions.   Musculoskeletal: Negative for joint pain, joint swelling and myalgias.   Gastrointestinal: Negative for abdominal pain, diarrhea, hematemesis, melena, nausea and vomiting.   Genitourinary: Positive for frequency. Negative for dysuria and hematuria.        ED   Neurological: Positive for dizziness. Negative for headaches, numbness, paresthesias and seizures.   Psychiatric/Behavioral: Negative for altered mental status and depression. The patient is not nervous/anxious.           Objective:     Vitals:    08/26/20 1410   BP: 118/62   BP Location: Left arm   Cuff Size: Adult   Pulse: 75   Weight: 85.5 kg (188 lb 6.4 oz)   Height: 180.3 cm (71\")     Body mass index is 26.28 kg/m².      PHYSICAL EXAM:  Physical Exam   Constitutional: He is oriented to person, place, and time. He appears well-developed and well-nourished. No distress.   HENT:   Head: Normocephalic and atraumatic.   Eyes: Pupils are equal, round, and reactive to light.   Neck: Neck supple. No JVD present. Carotid bruit is not present.   Cardiovascular: Normal rate, regular rhythm, normal heart sounds and intact distal pulses. Exam reveals no gallop and no friction rub.   No murmur heard.  Pulses:       Radial pulses are 2+ on the right side, and 2+ on the left side.        Posterior tibial pulses are 2+ on the right side, and 2+ on the left side.   Pulmonary/Chest: Effort normal and breath sounds normal. No respiratory distress. He " has no wheezes. He has no rales.   Abdominal: Soft. Bowel sounds are normal. He exhibits no distension.   Musculoskeletal: He exhibits edema (trace ankle). He exhibits no tenderness or deformity.   Neurological: He is alert and oriented to person, place, and time.   Skin: Skin is warm and dry. No rash noted. He is not diaphoretic. No erythema.   Psychiatric: He has a normal mood and affect. His behavior is normal. Judgment normal.           ECG 12 Lead  Date/Time: 8/26/2020 2:50 PM  Performed by: Nadine Blake DNP, APRN  Authorized by: Nadine Blake DNP, APRN   Comparison: compared with previous ECG from 7/22/2020  Similar to previous ECG  Rhythm: sinus rhythm  Rate: normal  BPM: 75  Conduction: right bundle branch block    Clinical impression: abnormal EKG  Comments: No significant changes from previous EKG              Assessment:       Diagnosis Plan   1. Coronary artery disease involving native coronary artery of native heart without angina pectoris     2. Chronic diastolic heart failure (CMS/HCC)     3. Recurrent strokes (CMS/HCC)     4. Sleep related hypoxia     5. YAW (obstructive sleep apnea)     6. Essential hypertension           Plan:     1. Chronic combined systolic and diastolic heart failure: EF actually normal on last echo.  He appears fairly euvolemic on exam.  Lungs clear and only trace lower extremity swelling which is normal for him.  2. Orthostatic hypotension: Felt to be secondary to neuropathy from diabetes.  He somehow got back on amlodipine which is likely lacerating his symptoms.  He does not believe that he has been taking midodrine but is not sure.  Nephrology did want him on lisinopril and he has been taking this.  At this point will stop amlodipine.  Add back low-dose metoprolol XL given history of cardiomyopathy.  Continue low-dose lisinopril per nephrology recommendations.  He will call with updated heart rate and blood pressure readings next week or sooner for issues or  concerns.  Will make further changes from there.  3. Coronary artery disease: With bypass grafting in 2001.  Denies anginal symptoms.  4. History of stroke: Transesophageal echo did not show cardiac source of embolus.  ZIO Patch did not show atrial fibrillation.  However neurologist had recommended anticoagulation given repeat stroke on aspirin and Plavix.  He was on warfarin but this was discontinued due to GI bleeding.  He will continue to follow with GI to see if he is able to resume this at some point once symptoms resolve or are felt to be controlled.  5. Hypertension: Blood pressure well controlled in the office today.  Patient to continue to monitor and call with updated readings next week, as above.  6. Hyperlipidemia: On statin therapy.  7. Chronic kidney disease: On lisinopril per nephrology.  8. Right bundle branch block  9. Chronically elevated troponin  10. GI bleed with gastritis: Followed by Dr. Louie.  He is going to call to see if he needs sooner appointment as he continues to have some dark stool that was also on iron.  He does believe that they are aware of this but will confirm.  11. Diabetes on insulin  12. Obstructive sleep apnea: Only occasionally using CPAP.  Using oxygen at night otherwise.  Followed by sleep medicine slow recently.  13. Chronic fatigue: Multifactorial.    Patient to keep October follow-up with Dr. Murguia as scheduled or follow-up sooner if needed for any new, recurrent, or worsening symptoms or other issues/concerns.             Your medication list           Accurate as of August 26, 2020  2:48 PM. If you have any questions, ask your nurse or doctor.               START taking these medications      Instructions Last Dose Given Next Dose Due   metoprolol succinate XL 25 MG 24 hr tablet  Commonly known as:  TOPROL-XL  Started by:  Nadine Blake, JULIO C, APRN      Take 1 tablet by mouth Daily.          CONTINUE taking these medications      Instructions Last Dose Given  Next Dose Due   Apidra 100 UNIT/ML injection  Generic drug:  insulin glulisine      Inject 2 Units under the skin into the appropriate area as directed 3 (Three) Times a Day Before Meals. Patient says he does not take consistently       aspirin 81 MG EC tablet      Take 81 mg by mouth Daily.       atorvastatin 40 MG tablet  Commonly known as:  LIPITOR      Take 40 mg by mouth Every Night.       buPROPion  MG 24 hr tablet  Commonly known as:  WELLBUTRIN XL      Take 150 mg by mouth Daily.       ferrous sulfate 325 (65 FE) MG tablet      Take 325 mg by mouth 3 (Three) Times a Day With Meals.       folic acid 1 MG tablet  Commonly known as:  FOLVITE      Take 1 tablet by mouth Daily.       furosemide 20 MG tablet  Commonly known as:  LASIX      Take 20 mg by mouth Daily.       Insulin Glargine 100 UNIT/ML injection pen  Commonly known as:  BASAGLAR KWIKPEN      Inject 8 Units under the skin into the appropriate area as directed Every Night. Patient says he does not take consistently       levothyroxine 75 MCG tablet  Commonly known as:  SYNTHROID, LEVOTHROID      Take 75 mcg by mouth Daily.       lisinopril 20 MG tablet  Commonly known as:  PRINIVIL,ZESTRIL      Take 20 mg by mouth Daily.       pantoprazole 40 MG EC tablet  Commonly known as:  PROTONIX      Take 1 tablet by mouth Daily.       tamsulosin 0.4 MG capsule 24 hr capsule  Commonly known as:  FLOMAX      Take 1 capsule by mouth Every Night.       vitamin B-12 1000 MCG tablet  Commonly known as:  CYANOCOBALAMIN      Take 1 tablet by mouth Daily.       vitamin D3 125 MCG (5000 UT) capsule capsule      Take 5,000 Units by mouth Daily.          STOP taking these medications    amLODIPine 5 MG tablet  Commonly known as:  NORVASC  Stopped by:  Nadine Blake, JULIO C, APRN              Where to Get Your Medications      These medications were sent to [facility] [place] - [address] [facility] - [phone]  - [phone]   [id number]  [address], [facility] [address]    Phone:  [phone]   · metoprolol succinate XL 25 MG 24 hr tablet         The above medication changes may not have been made by this provider.  Patient's medication list was updated to reflect medications they are currently taking including medication changes made by other providers.        Thanks,    Nadine Blake, JULIO C, APRN  08/26/2020           Dictated utilizing Dragon dictation

## 2020-09-11 ENCOUNTER — TRANSCRIBE ORDERS (OUTPATIENT)
Dept: ADMINISTRATIVE | Facility: HOSPITAL | Age: 65
End: 2020-09-11

## 2020-09-11 DIAGNOSIS — Z01.818 OTHER SPECIFIED PRE-OPERATIVE EXAMINATION: Primary | ICD-10-CM

## 2020-09-12 ENCOUNTER — LAB (OUTPATIENT)
Dept: LAB | Facility: HOSPITAL | Age: 65
End: 2020-09-12

## 2020-09-12 DIAGNOSIS — Z01.818 OTHER SPECIFIED PRE-OPERATIVE EXAMINATION: ICD-10-CM

## 2020-09-12 PROCEDURE — U0004 COV-19 TEST NON-CDC HGH THRU: HCPCS

## 2020-09-12 PROCEDURE — C9803 HOPD COVID-19 SPEC COLLECT: HCPCS

## 2020-09-14 LAB — SARS-COV-2 RNA RESP QL NAA+PROBE: NOT DETECTED

## 2020-09-15 ENCOUNTER — HOSPITAL ENCOUNTER (OUTPATIENT)
Facility: HOSPITAL | Age: 65
Setting detail: HOSPITAL OUTPATIENT SURGERY
Discharge: HOME | End: 2020-09-15
Attending: INTERNAL MEDICINE | Admitting: INTERNAL MEDICINE

## 2020-09-15 ENCOUNTER — ANESTHESIA (OUTPATIENT)
Dept: GASTROENTEROLOGY | Facility: HOSPITAL | Age: 65
End: 2020-09-15

## 2020-09-15 ENCOUNTER — ANESTHESIA EVENT (OUTPATIENT)
Dept: GASTROENTEROLOGY | Facility: HOSPITAL | Age: 65
End: 2020-09-15

## 2020-09-15 VITALS
RESPIRATION RATE: 16 BRPM | HEART RATE: 77 BPM | OXYGEN SATURATION: 99 % | DIASTOLIC BLOOD PRESSURE: 75 MMHG | SYSTOLIC BLOOD PRESSURE: 143 MMHG

## 2020-09-15 DIAGNOSIS — K20.90 ESOPHAGITIS: ICD-10-CM

## 2020-09-15 LAB
GLUCOSE BLDC GLUCOMTR-MCNC: 125 MG/DL (ref 70–130)
UREASE TISS QL: POSITIVE

## 2020-09-15 PROCEDURE — S0260 H&P FOR SURGERY: HCPCS | Performed by: INTERNAL MEDICINE

## 2020-09-15 PROCEDURE — 25010000002 PROPOFOL 10 MG/ML EMULSION: Performed by: ANESTHESIOLOGY

## 2020-09-15 PROCEDURE — 88305 TISSUE EXAM BY PATHOLOGIST: CPT | Performed by: INTERNAL MEDICINE

## 2020-09-15 PROCEDURE — 43239 EGD BIOPSY SINGLE/MULTIPLE: CPT | Performed by: INTERNAL MEDICINE

## 2020-09-15 PROCEDURE — 87081 CULTURE SCREEN ONLY: CPT | Performed by: INTERNAL MEDICINE

## 2020-09-15 PROCEDURE — 82962 GLUCOSE BLOOD TEST: CPT

## 2020-09-15 RX ORDER — LIDOCAINE HYDROCHLORIDE 20 MG/ML
INJECTION, SOLUTION INFILTRATION; PERINEURAL AS NEEDED
Status: DISCONTINUED | OUTPATIENT
Start: 2020-09-15 | End: 2020-09-15 | Stop reason: SURG

## 2020-09-15 RX ORDER — SODIUM CHLORIDE, SODIUM LACTATE, POTASSIUM CHLORIDE, CALCIUM CHLORIDE 600; 310; 30; 20 MG/100ML; MG/100ML; MG/100ML; MG/100ML
30 INJECTION, SOLUTION INTRAVENOUS CONTINUOUS PRN
Status: DISCONTINUED | OUTPATIENT
Start: 2020-09-15 | End: 2020-09-15 | Stop reason: HOSPADM

## 2020-09-15 RX ORDER — PROPOFOL 10 MG/ML
VIAL (ML) INTRAVENOUS AS NEEDED
Status: DISCONTINUED | OUTPATIENT
Start: 2020-09-15 | End: 2020-09-15 | Stop reason: SURG

## 2020-09-15 RX ORDER — PROPOFOL 10 MG/ML
VIAL (ML) INTRAVENOUS CONTINUOUS PRN
Status: DISCONTINUED | OUTPATIENT
Start: 2020-09-15 | End: 2020-09-15 | Stop reason: SURG

## 2020-09-15 RX ORDER — GLYCOPYRROLATE 0.2 MG/ML
INJECTION INTRAMUSCULAR; INTRAVENOUS AS NEEDED
Status: DISCONTINUED | OUTPATIENT
Start: 2020-09-15 | End: 2020-09-15 | Stop reason: SURG

## 2020-09-15 RX ADMIN — SODIUM CHLORIDE, POTASSIUM CHLORIDE, SODIUM LACTATE AND CALCIUM CHLORIDE 30 ML/HR: 600; 310; 30; 20 INJECTION, SOLUTION INTRAVENOUS at 13:10

## 2020-09-15 RX ADMIN — GLYCOPYRROLATE 0.2 MCG: 0.2 INJECTION INTRAMUSCULAR; INTRAVENOUS at 13:50

## 2020-09-15 RX ADMIN — PROPOFOL 100 MG: 10 INJECTION, EMULSION INTRAVENOUS at 13:50

## 2020-09-15 RX ADMIN — PROPOFOL 100 MCG/KG/MIN: 10 INJECTION, EMULSION INTRAVENOUS at 13:50

## 2020-09-15 RX ADMIN — LIDOCAINE HYDROCHLORIDE 50 MG: 20 INJECTION, SOLUTION INFILTRATION; PERINEURAL at 13:50

## 2020-09-15 NOTE — NURSING NOTE
Awaiting for pt's wife to pull around the building to  pt. Pt's wife car is being jumpstarted by the security in the parking lot

## 2020-09-15 NOTE — H&P
[facility]  Pre Procedure History & Physical    Chief Complaint:   Esophagitis, melena    Subjective     HPI:   This 65-year-old male presents the endoscopy suite for upper endoscopic evaluation.  He had a history of hospitalization with iron deficiency anemia that warranted upper and lower endoscopy.  Those studies revealed a colon polyp and grade C esophagitis.  He has reported subsequent melena.  He is undergoing upper endoscopy to reassess for bleeding source.    Past Medical History:   Past Medical History:   Diagnosis Date   • Acquired hypothyroidism    • Acute congestive heart failure (CMS/HCC)    • Acute respiratory failure with hypoxia (CMS/HCC)    • Acute sinusitis    • SYLVAIN (acute kidney injury) (CMS/HCC)     on CKD   • Anemia    • Arthritis    • CAD (coronary artery disease)    • Cancer (CMS/HCC)     skin   • Chronic combined systolic and diastolic congestive heart failure (CMS/HCC)    • Chronic ischemic heart disease    • CKD (chronic kidney disease)    • COPD (chronic obstructive pulmonary disease) (CMS/HCC)    • Depression    • Diabetes mellitus type 2, uncontrolled, without complications    • Disease of thyroid gland    • Fatigue    • Frequent PVCs    • Heart attack (CMS/HCC)    • History of coronary artery disease     with remote history of bypass surgery in 2001   • History of transfusion    • Hyperglycemia    • Hyperlipidemia    • Hypertension    • Hypertensive encephalopathy    • Mental status change     Acute   • YAW on CPAP    • Pleural effusion    • Pneumonia    • RBBB (right bundle branch block)    • Screening for prostate cancer 2011   • Stroke (CMS/HCC)    • TIA (transient ischemic attack)    • Type 2 diabetes mellitus (CMS/HCC)    • Urinary retention    • Vitamin D deficiency        Past Surgical History:  Past Surgical History:   Procedure Laterality Date   • APPENDECTOMY N/A 02/14/2016    Dr. Alexey Dodson   • COLONOSCOPY      over 20 years ago.  no polyps    •  COLONOSCOPY N/A 2018    Procedure: COLONOSCOPY;  Surgeon: Jose Enrique Louie MD;  Location:  [facility];  Service: Gastroenterology   • COLONOSCOPY N/A 2018    IH, EH, polyps (TAs w/low grade dysplasia)   • COLONOSCOPY N/A 2020    Procedure: COLONOSCOPY;  Surgeon: Jose Enrique Louie MD;  Location:  [facility];  Service: Gastroenterology;  Laterality: N/A;  Pre op-Anemia, Melena, History of Polyps  Post op-To Cecum/TI, Polyp, Poor Prep   • CORONARY ARTERY BYPASS GRAFT  2001   • ENDOSCOPY N/A 2020    Procedure: ESOPHAGOGASTRODUODENOSCOPY with biopsies;  Surgeon: mAanda Lovelace MD;  Location:  [facility];  Service: Gastroenterology;  Laterality: N/A;  pre-anemia, dark stools  post-esophagitis, hiatal hernia   • HERNIA REPAIR Left 2019   • TONSILLECTOMY     • VASECTOMY         Family History:  Family History   Problem Relation Age of Onset   • Diabetes Mother    • Hypertension Mother    • Macular degeneration Mother    • Alcohol abuse Father    • Cancer Father         lung,  age 65   • Heart disease Father    • Alcohol abuse Brother    • Cirrhosis Brother          age 50   • Liver cancer Brother 45   • Diabetes Son    • Kidney failure Son    • Autism Son        Social History:   reports that he has never smoked. He has never used smokeless tobacco. He reports that he does not drink alcohol or use drugs.    Medications:   No medications prior to admission.       Allergies:  Fluoxetine and Prozac [fluoxetine hcl]    ROS:    Pertinent items are noted in HPI, all other systems reviewed and negative     Objective     There were no vitals taken for this visit.    Physical Exam   Constitutional: Pt is oriented to person, place, and time and well-developed, well-nourished, and in no distress.   Mouth/Throat: Oropharynx is clear and moist.   Neck: Normal range of motion.   Cardiovascular: Normal rate, regular rhythm and normal heart sounds.    Pulmonary/Chest:  Effort normal and breath sounds normal.   Abdominal: Soft. Nontender  Skin: Skin is warm and dry.   Psychiatric: Mood, memory, affect and judgment normal.     Assessment/Plan     Diagnosis:  Esophagitis  Melena    Anticipated Surgical Procedure:  EGD    The risks, benefits, and alternatives of this procedure have been discussed with the patient or the responsible party- the patient understands and agrees to proceed.

## 2020-09-15 NOTE — ANESTHESIA POSTPROCEDURE EVALUATION
Patient: Carlito Mo    Procedure Summary     Date: 09/15/20 Room / Location:  [facility] /  [facility]    Anesthesia Start: 1347 Anesthesia Stop: 1413    Procedure: ESOPHAGOGASTRODUODENOSCOPY WITH BIOPSIES (N/A Esophagus) Diagnosis:       Esophagitis      Gastritis      Hiatal hernia      (Esophagitis [K20.9])    Surgeon: Jose Enrique Louie MD Provider: Javad Juares MD    Anesthesia Type: MAC ASA Status: 4          Anesthesia Type: MAC    Vitals  Vitals Value Taken Time   /75 09/15/20 1436   Temp     Pulse 77 09/15/20 1436   Resp 16 09/15/20 1436   SpO2 99 % 09/15/20 1436           Post Anesthesia Care and Evaluation    Patient location during evaluation: PACU  Patient participation: complete - patient participated  Level of consciousness: awake  Pain score: 0  Pain management: adequate  Airway patency: patent  Anesthetic complications: No anesthetic complications  PONV Status: none  Cardiovascular status: acceptable  Respiratory status: acceptable  Hydration status: acceptable    Comments: /75 (BP Location: Left arm, Patient Position: Lying)   Pulse 77   Resp 16   SpO2 99%

## 2020-09-17 LAB
LAB AP CASE REPORT: NORMAL
PATH REPORT.FINAL DX SPEC: NORMAL
PATH REPORT.GROSS SPEC: NORMAL

## 2020-09-21 ENCOUNTER — TELEPHONE (OUTPATIENT)
Dept: GASTROENTEROLOGY | Facility: CLINIC | Age: 65
End: 2020-09-21

## 2020-09-21 RX ORDER — CLARITHROMYCIN 500 MG/1
500 TABLET, COATED ORAL 2 TIMES DAILY
Qty: 28 TABLET | Refills: 0 | Status: SHIPPED | OUTPATIENT
Start: 2020-09-21 | End: 2020-10-27

## 2020-09-21 RX ORDER — AMOXICILLIN 500 MG/1
1000 CAPSULE ORAL 2 TIMES DAILY
Qty: 56 CAPSULE | Refills: 0 | Status: SHIPPED | OUTPATIENT
Start: 2020-09-21 | End: 2020-10-06

## 2020-09-21 RX ORDER — LANSOPRAZOLE 30 MG/1
30 CAPSULE, DELAYED RELEASE ORAL 2 TIMES DAILY
Qty: 28 CAPSULE | Refills: 0 | Status: ON HOLD | OUTPATIENT
Start: 2020-09-21 | End: 2021-05-21

## 2020-09-21 NOTE — TELEPHONE ENCOUNTER
----- Message from Jose Enrique PONCE MD sent at 9/19/2020  1:02 PM EDT -----  Regarding: Biopsy results  Okay to notify patient tested positive for helical factor pylori, would treat with Prevpac or Pylera and once complete resume once daily PPI.  ----- Message -----  From: Lab, Background User  Sent: 9/15/2020   4:03 PM EDT  To: Jose Enrique PONCE MD

## 2020-09-21 NOTE — TELEPHONE ENCOUNTER
Called pt and advised per Dr Louie that his duodenum bx was bening.  The stomach bx showed h pylori gastritis. The ge junction bx showed reflux esophagitis.  He recommends treatment with prevacid bid, amoxicillin bid and biaxin bid for two weeks.  Confirmed allergies with pt . Advised pt to hold his lipitor and his pantoprazole for the 2 wks he is taking this treatment . ADvised he can resume them once he has completed his meds. Advised we will escribe the meds to his [facility].  Pt verb understanding.

## 2020-10-06 ENCOUNTER — APPOINTMENT (OUTPATIENT)
Dept: ONCOLOGY | Facility: HOSPITAL | Age: 65
End: 2020-10-06

## 2020-10-06 ENCOUNTER — LAB (OUTPATIENT)
Dept: LAB | Facility: HOSPITAL | Age: 65
End: 2020-10-06

## 2020-10-06 ENCOUNTER — OFFICE VISIT (OUTPATIENT)
Dept: ONCOLOGY | Facility: CLINIC | Age: 65
End: 2020-10-06

## 2020-10-06 VITALS
DIASTOLIC BLOOD PRESSURE: 68 MMHG | RESPIRATION RATE: 16 BRPM | TEMPERATURE: 96.6 F | HEART RATE: 75 BPM | OXYGEN SATURATION: 92 % | BODY MASS INDEX: 26.9 KG/M2 | HEIGHT: 71 IN | WEIGHT: 192.1 LBS | SYSTOLIC BLOOD PRESSURE: 119 MMHG

## 2020-10-06 DIAGNOSIS — N18.30 ANEMIA, CHRONIC RENAL FAILURE, STAGE 3 (MODERATE) (HCC): Primary | ICD-10-CM

## 2020-10-06 DIAGNOSIS — N18.30 ANEMIA, CHRONIC RENAL FAILURE, STAGE 3 (MODERATE) (HCC): ICD-10-CM

## 2020-10-06 DIAGNOSIS — D63.1 ANEMIA, CHRONIC RENAL FAILURE, STAGE 3 (MODERATE) (HCC): ICD-10-CM

## 2020-10-06 DIAGNOSIS — N18.9 ACUTE ON CHRONIC RENAL INSUFFICIENCY: ICD-10-CM

## 2020-10-06 DIAGNOSIS — N28.9 ACUTE ON CHRONIC RENAL INSUFFICIENCY: ICD-10-CM

## 2020-10-06 DIAGNOSIS — D63.1 ANEMIA, CHRONIC RENAL FAILURE, STAGE 3 (MODERATE) (HCC): Primary | ICD-10-CM

## 2020-10-06 LAB
ALBUMIN SERPL-MCNC: 3.8 G/DL (ref 3.5–5.2)
ALBUMIN/GLOB SERPL: 1.5 G/DL (ref 1.1–2.4)
ALP SERPL-CCNC: 92 U/L (ref 38–116)
ALT SERPL W P-5'-P-CCNC: 27 U/L (ref 0–41)
ANION GAP SERPL CALCULATED.3IONS-SCNC: 7.8 MMOL/L (ref 5–15)
AST SERPL-CCNC: 25 U/L (ref 0–40)
BASOPHILS # BLD AUTO: 0.02 10*3/MM3 (ref 0–0.2)
BASOPHILS NFR BLD AUTO: 0.3 % (ref 0–1.5)
BILIRUB SERPL-MCNC: 0.3 MG/DL (ref 0.2–1.2)
BUN SERPL-MCNC: 46 MG/DL (ref 6–20)
BUN/CREAT SERPL: 17.2 (ref 7.3–30)
CALCIUM SPEC-SCNC: 9.2 MG/DL (ref 8.5–10.2)
CHLORIDE SERPL-SCNC: 109 MMOL/L (ref 98–107)
CO2 SERPL-SCNC: 24.2 MMOL/L (ref 22–29)
CREAT SERPL-MCNC: 2.67 MG/DL (ref 0.7–1.3)
DEPRECATED RDW RBC AUTO: 39.8 FL (ref 37–54)
EOSINOPHIL # BLD AUTO: 0.28 10*3/MM3 (ref 0–0.4)
EOSINOPHIL NFR BLD AUTO: 4.5 % (ref 0.3–6.2)
ERYTHROCYTE [DISTWIDTH] IN BLOOD BY AUTOMATED COUNT: 13 % (ref 12.3–15.4)
FERRITIN SERPL-MCNC: 329.1 NG/ML (ref 30–400)
GFR SERPL CREATININE-BSD FRML MDRD: 24 ML/MIN/1.73
GLOBULIN UR ELPH-MCNC: 2.6 GM/DL (ref 1.8–3.5)
GLUCOSE SERPL-MCNC: 170 MG/DL (ref 74–124)
HCT VFR BLD AUTO: 34.7 % (ref 37.5–51)
HGB BLD-MCNC: 10.8 G/DL (ref 13–17.7)
IMM GRANULOCYTES # BLD AUTO: 0.02 10*3/MM3 (ref 0–0.05)
IMM GRANULOCYTES NFR BLD AUTO: 0.3 % (ref 0–0.5)
IRON 24H UR-MRATE: 48 MCG/DL (ref 59–158)
IRON SATN MFR SERPL: 16 % (ref 14–48)
LYMPHOCYTES # BLD AUTO: 1.09 10*3/MM3 (ref 0.7–3.1)
LYMPHOCYTES NFR BLD AUTO: 17.6 % (ref 19.6–45.3)
MCH RBC QN AUTO: 26.5 PG (ref 26.6–33)
MCHC RBC AUTO-ENTMCNC: 31.1 G/DL (ref 31.5–35.7)
MCV RBC AUTO: 85 FL (ref 79–97)
MONOCYTES # BLD AUTO: 0.4 10*3/MM3 (ref 0.1–0.9)
MONOCYTES NFR BLD AUTO: 6.5 % (ref 5–12)
NEUTROPHILS NFR BLD AUTO: 4.39 10*3/MM3 (ref 1.7–7)
NEUTROPHILS NFR BLD AUTO: 70.8 % (ref 42.7–76)
NRBC BLD AUTO-RTO: 0 /100 WBC (ref 0–0.2)
PLATELET # BLD AUTO: 211 10*3/MM3 (ref 140–450)
PMV BLD AUTO: 9.4 FL (ref 6–12)
POTASSIUM SERPL-SCNC: 4.7 MMOL/L (ref 3.5–4.7)
PROT SERPL-MCNC: 6.4 G/DL (ref 6.3–8)
RBC # BLD AUTO: 4.08 10*6/MM3 (ref 4.14–5.8)
SODIUM SERPL-SCNC: 141 MMOL/L (ref 134–145)
TIBC SERPL-MCNC: 298 MCG/DL (ref 249–505)
TRANSFERRIN SERPL-MCNC: 213 MG/DL (ref 200–360)
WBC # BLD AUTO: 6.2 10*3/MM3 (ref 3.4–10.8)

## 2020-10-06 PROCEDURE — 36415 COLL VENOUS BLD VENIPUNCTURE: CPT

## 2020-10-06 PROCEDURE — 83540 ASSAY OF IRON: CPT

## 2020-10-06 PROCEDURE — 80053 COMPREHEN METABOLIC PANEL: CPT

## 2020-10-06 PROCEDURE — 82728 ASSAY OF FERRITIN: CPT

## 2020-10-06 PROCEDURE — 85025 COMPLETE CBC W/AUTO DIFF WBC: CPT

## 2020-10-06 PROCEDURE — 99214 OFFICE O/P EST MOD 30 MIN: CPT | Performed by: INTERNAL MEDICINE

## 2020-10-06 PROCEDURE — 84466 ASSAY OF TRANSFERRIN: CPT

## 2020-10-27 ENCOUNTER — OFFICE VISIT (OUTPATIENT)
Dept: CARDIOLOGY | Facility: CLINIC | Age: 65
End: 2020-10-27

## 2020-10-27 VITALS
HEART RATE: 71 BPM | TEMPERATURE: 97 F | BODY MASS INDEX: 26.04 KG/M2 | WEIGHT: 186 LBS | SYSTOLIC BLOOD PRESSURE: 144 MMHG | HEIGHT: 71 IN | OXYGEN SATURATION: 98 % | DIASTOLIC BLOOD PRESSURE: 82 MMHG

## 2020-10-27 DIAGNOSIS — I50.32 CHRONIC DIASTOLIC HEART FAILURE (HCC): ICD-10-CM

## 2020-10-27 DIAGNOSIS — E78.49 OTHER HYPERLIPIDEMIA: ICD-10-CM

## 2020-10-27 DIAGNOSIS — I10 ESSENTIAL HYPERTENSION: ICD-10-CM

## 2020-10-27 DIAGNOSIS — I25.10 CORONARY ARTERY DISEASE INVOLVING NATIVE CORONARY ARTERY OF NATIVE HEART WITHOUT ANGINA PECTORIS: Primary | ICD-10-CM

## 2020-10-27 PROCEDURE — 93000 ELECTROCARDIOGRAM COMPLETE: CPT | Performed by: INTERNAL MEDICINE

## 2020-10-27 PROCEDURE — 99214 OFFICE O/P EST MOD 30 MIN: CPT | Performed by: INTERNAL MEDICINE

## 2020-10-27 RX ORDER — METOPROLOL SUCCINATE 50 MG/1
50 TABLET, EXTENDED RELEASE ORAL DAILY
Qty: 90 TABLET | Refills: 3 | Status: SHIPPED | OUTPATIENT
Start: 2020-10-27 | End: 2021-05-24 | Stop reason: HOSPADM

## 2020-10-27 RX ORDER — LISINOPRIL 20 MG/1
20 TABLET ORAL DAILY
Qty: 90 TABLET | Refills: 3 | Status: ON HOLD | OUTPATIENT
Start: 2020-10-27 | End: 2021-05-21

## 2020-10-27 NOTE — PROGRESS NOTES
PATIENTINFORMATION    Date of Office Visit: 10/27/20  Encounter Provider: Amber Murguia MD  Place of Service: [facility]  Patient Name: Carlito Mo  : 1955    Subjective:         Patient ID: Carlito Mo is a 65 y.o. male.      History of Present Illness    Mr. Carlito Mo is a male patient with a history of CAD/MI (CABG ), HTN, hyperlipidemia, type 2 diabetes mellitus, anemia, chronic kidney disease, and basal cell carcinoma.  I first saw him in 2017 when he was admitted with altered mental status.  He was diagnosed with multiple small acute infarcts.  His echocardiogram from 2017 showed mild left ventricular hypertrophy, mild left atrial enlargement, negative bubble study, normal LV systolic function with an ejection fraction of 68%.  Transesophageal echo in 2017 showed that his LV function was mildly low with an ejection fraction of 45% with focal wall motion abnormalities primarily in the septum and anterior wall and apex.  Small patent foramen ovale was noted there was no significant valvular heart disease or cardiac source of embolus minus the small PFO.  Holter monitor showed a couple of short bursts of SVT but no sustained arrhythmia.      He came back to the hospital in 2017 again with altered mental status.  At this time he had an echocardiogram which shows ejection fraction was 63%, grade 1 diastolic dysfunction and no significant valvular heart disease.  He wore a Zio patch which showed PACs, PVCs and nonsustained SVT.     He was back in the hospital in May 2018 with acute mental status changes and a blood pressure of 212/109.  He was noted at that time to have a chronically elevated troponin without acute EKG changes.     Sadly he was hospitalized again in 2018 after he was hypoxic and persistently lethargic after a colonoscopy.  During that admission he did have an echocardiogram which revealed his LV  function had decreased and his ejection fraction was now 46%.  There was grade II diastolic dysfunction and mildly elevated pulmonary pressures with a left pleural effusion.  He was treated for pneumonia.  He was also diuresed and seemed to be back to his baseline although he was discharged with 2 liters of oxygen at night.      Echo 5/27/20  · Left ventricular systolic function is normal. Calculated EF = 60%. There is hypokinesis of the distal anteroseptal, distal inferoseptal and the apex. Diastolic function is indeterminate.  · There is mild concentric hypertrophy of the left ventricle.  · Normal right ventricular cavity size, wall thickness, systolic function  · Left atrial cavity size is mildly dilated.  · No significant valvular abnormality noted.  · Compared to prior study on 12/2/2018 left ventricular ejection fraction has improved.     He was hospitalized in June 2020 with progressive weakness and acute kidney injury with anemia.  He required some fluid resuscitation.  EGD showed  non-showed nonbleeding gastritis he had pronounced orthostatic hypotension and his medications were discontinued.  He followed up with [facility] in June 2020 he was not having any bleeding at that time.  He was orthostatic in the office at that visit he was on Midrin.  He is off all hypertensive medications.  He has been following with Dr. Louie for gastritis and esophagitis and melena.  This is better.  He is on iron.    He has some chronic lower extremity edema.  He denies chest pain.  He still having some slight dizziness but better than over the summer.  His breathing is at baseline.  His blood pressure is up.  He is off Midrin.  He is back on some lisinopril and Toprol     Review of Systems   Constitution: Positive for malaise/fatigue. Negative for fever, weight gain and weight loss.   HENT: Negative for ear pain, hearing loss, nosebleeds and sore throat.    Eyes: Positive for blurred vision. Negative for double vision, pain,  "vision loss in left eye and vision loss in right eye.   Cardiovascular: Positive for dyspnea on exertion.        See history of present illness.   Respiratory: Positive for snoring. Negative for cough, shortness of breath, sleep disturbances due to breathing and wheezing.    Endocrine: Positive for cold intolerance. Negative for heat intolerance and polyuria.   Skin: Negative for itching, poor wound healing and rash.   Musculoskeletal: Negative for joint pain, joint swelling and myalgias.   Gastrointestinal: Negative for abdominal pain, diarrhea, hematochezia, nausea and vomiting.   Genitourinary: Positive for decreased libido. Negative for hematuria and hesitancy.   Neurological: Positive for excessive daytime sleepiness. Negative for numbness, paresthesias and seizures.   Psychiatric/Behavioral: Positive for depression. The patient is not nervous/anxious.            ECG 12 Lead    Date/Time: 10/27/2020 12:19 PM  Performed by: Amber Murguia MD  Authorized by: Amber Murguia MD   Comparison: compared with previous ECG from 8/26/2020  Similar to previous ECG  Rhythm: sinus rhythm  BPM: 67  Conduction: right bundle branch block  ST Segments: ST segments normal  T Waves: T waves normal    Clinical impression: abnormal EKG               Objective:     /82 (BP Location: Left arm)   Pulse 71   Temp 97 °F (36.1 °C) (Infrared)   Ht 180.3 cm (71\")   Wt 84.4 kg (186 lb)   SpO2 98%   BMI 25.94 kg/m²  Body mass index is 25.94 kg/m².     Vitals signs reviewed.   Constitutional:       Appearance: Normal appearance. Well-developed.   Eyes:      General: Lids are normal. Lids are everted, no foreign bodies appreciated.      Conjunctiva/sclera: Conjunctivae normal.      Pupils: Pupils are equal, round, and reactive to light.   HENT:      Head: Normocephalic and atraumatic.   Neck:      Musculoskeletal: Normal range of motion.      Thyroid: No thyroid mass.      Vascular: No carotid bruit or JVD.      Trachea: No " tracheal deviation.   Pulmonary:      Effort: Pulmonary effort is normal.      Breath sounds: Normal breath sounds.   Cardiovascular:      Normal rate. Regular rhythm.   Pulses:     Dorsalis pedis: 2+ bilaterally.  Abdominal:      General: Bowel sounds are normal.   Musculoskeletal: Normal range of motion.   Skin:     General: Skin is warm and dry.   Neurological:      Mental Status: Alert.   Psychiatric:         Behavior: Behavior normal.         Lab Review:   Component      Latest Ref Rng & Units 6/26/2020 7/14/2020 9/15/2020 10/6/2020                Glucose      74 - 124 mg/dL 197 (H) 202 (H) 125 170 (H)   BUN      6 - 20 mg/dL 23 (H) 27 (H)  46 (H)   Creatinine      0.70 - 1.30 mg/dL 1.7 (H) 1.99 (HH)  2.67 (HH)   Sodium      134 - 145 mmol/L 139 140  141   Potassium      3.5 - 4.7 mmol/L 4.6 4.3  4.7   Chloride      98 - 107 mmol/L 105 105  109 (H)   CO2      22.0 - 29.0 mmol/L  24.6  24.2   Calcium      8.5 - 10.2 mg/dL 8.9 9.1  9.2   Total Protein      6.3 - 8.0 g/dL  6.3  6.4   Albumin      3.50 - 5.20 g/dL  3.70  3.80   ALT (SGPT)      0 - 41 U/L  18  27   AST (SGOT)      0 - 40 U/L  19  25   Alkaline Phosphatase      38 - 116 U/L  78  92   Total Bilirubin      0.2 - 1.2 mg/dL  0.4  0.3   eGFR Non African Am      >60 mL/min/1.73  34 (L)  24 (L)   Globulin      1.8 - 3.5 gm/dL  2.6  2.6   A/G Ratio      1.1 - 2.4 g/dL  1.4  1.5   BUN/Creatinine Ratio      7.3 - 30.0 13.0 13.6  17.2   Anion Gap      5.0 - 15.0 mmol/L  10.4  7.8         Assessment/Plan:       1.  Chronic systolic and diastolic heart failure.  He has back on lisinopril and Toprol-XL.  He is not on any diuretics.  He looks pretty euvolemic.  I am going to defer any diuretic therapy to his nephrologist given his kidney function.  2.  Coronary artery disease.  He had a bypass surgery in 2001.  He is off aspirin due to melena.  3.  History of stroke.  Transesophageal echocardiogram did not show cardiac source of embolus.  Zio patch did not show  atrial fibrillation.  His neurologist recommended anticoagulation since he had repeat strokes on aspirin and Plavix.  He was on warfarin but that has been discontinued due to his GI bleeding.  4.  Hypertension.  His blood pressure is up a little bit.  I am going to increase Toprol-XL up to 25 mg a day.  5.  Hyperlipidemia.  Atorvastatin.  His lipids are followed by his primary doctor.  6.  Chronic kidney disease.  Baseline creatinine is about 1.6.  Most recent creatinine was 2.67 on October 6 of 2020.  He follows with Dr. Huddleston  7.  Right bundle branch block  8.  Chronically elevated troponin  9.    Gastritis.  Followed by Dr. Louie.  10.  Diabetes on insulin  11.  Obstructive sleep apnea on CPAP  12.  Orthostatic hypotension likely secondary to neuropathy from diabetes.    He is going to start checking his blood pressure at home and let me know if it is a problem.  Follow-up with [facility]iel in 3 months.     Orders Placed This Encounter   Procedures   • ECG 12 Lead     This order was created via procedure documentation        Discharge Medications          Accurate as of October 27, 2020 12:23 PM. If you have any questions, ask your nurse or doctor.            Changes to Medications      Instructions Start Date   metoprolol succinate XL 50 MG 24 hr tablet  Commonly known as: TOPROL-XL  What changed:   · medication strength  · how much to take  Changed by: Amber Murguia MD   50 mg, Oral, Daily         Continue These Medications      Instructions Start Date   Apidra 100 UNIT/ML injection  Generic drug: insulin glulisine   2 Units, Subcutaneous, 3 Times Daily Before Meals, Patient says he does not take consistently      aspirin 81 MG EC tablet   81 mg, Oral, Daily      atorvastatin 40 MG tablet  Commonly known as: LIPITOR   40 mg, Oral, Nightly      buPROPion  MG 24 hr tablet  Commonly known as: WELLBUTRIN XL   150 mg, Oral, Daily      ferrous sulfate 325 (65 FE) MG tablet   325 mg, Oral, 3 Times Daily With  Meals      folic acid 1 MG tablet  Commonly known as: FOLVITE   1 mg, Oral, Daily      Insulin Glargine 100 UNIT/ML injection pen  Commonly known as: BASAGLAR KWIKPEN   8 Units, Subcutaneous, Nightly, Patient says he does not take consistently      lansoprazole 30 MG capsule  Commonly known as: PREVACID   30 mg, Oral, 2 Times Daily      levothyroxine 75 MCG tablet  Commonly known as: SYNTHROID, LEVOTHROID   75 mcg, Oral, Daily      lisinopril 20 MG tablet  Commonly known as: PRINIVIL,ZESTRIL   20 mg, Oral, Daily      pantoprazole 40 MG EC tablet  Commonly known as: PROTONIX   40 mg, Oral, Daily      tamsulosin 0.4 MG capsule 24 hr capsule  Commonly known as: FLOMAX   1 capsule, Oral, Nightly      vitamin B-12 1000 MCG tablet  Commonly known as: CYANOCOBALAMIN   1,000 mcg, Oral, Daily      vitamin D3 125 MCG (5000 UT) capsule capsule   5,000 Units, Oral, Daily         Stop These Medications    clarithromycin 500 MG tablet  Commonly known as: BIAXIN  Stopped by: MD Amber Padilla MD  10/27/20  12:23 EDT

## 2020-10-29 ENCOUNTER — ANTICOAGULATION VISIT (OUTPATIENT)
Dept: PHARMACY | Facility: HOSPITAL | Age: 65
End: 2020-10-29

## 2020-10-29 DIAGNOSIS — I63.9 RECURRENT STROKES (HCC): ICD-10-CM

## 2020-10-29 NOTE — PROGRESS NOTES
"This visit is for documentation purposes only: Please see 10/27/20 Cardiology Note: \"His neurologist recommended anticoagulation since he had repeat strokes on aspirin and Plavix.  He was on warfarin but that has been discontinued due to his GI bleeding.\"    It appears that warfarin is not planned to be restarted, as Mr. Marina warfarin has been discontinued now since his 5/25/20 hospital admission  No longer following in the [facility]. It has been a pleasure being part of his care. If warfarin is resumed in future, we would be happy to again participate in his care upon referral.   "

## 2021-01-06 ENCOUNTER — LAB (OUTPATIENT)
Dept: LAB | Facility: HOSPITAL | Age: 66
End: 2021-01-06

## 2021-01-06 ENCOUNTER — OFFICE VISIT (OUTPATIENT)
Dept: ONCOLOGY | Facility: CLINIC | Age: 66
End: 2021-01-06

## 2021-01-06 VITALS
RESPIRATION RATE: 16 BRPM | OXYGEN SATURATION: 98 % | DIASTOLIC BLOOD PRESSURE: 78 MMHG | BODY MASS INDEX: 25.34 KG/M2 | WEIGHT: 181 LBS | HEART RATE: 56 BPM | SYSTOLIC BLOOD PRESSURE: 130 MMHG | TEMPERATURE: 97.8 F | HEIGHT: 71 IN

## 2021-01-06 DIAGNOSIS — N18.30 ANEMIA, CHRONIC RENAL FAILURE, STAGE 3 (MODERATE) (HCC): Primary | ICD-10-CM

## 2021-01-06 DIAGNOSIS — N18.30 ANEMIA, CHRONIC RENAL FAILURE, STAGE 3 (MODERATE) (HCC): ICD-10-CM

## 2021-01-06 DIAGNOSIS — D63.1 ANEMIA, CHRONIC RENAL FAILURE, STAGE 3 (MODERATE) (HCC): ICD-10-CM

## 2021-01-06 DIAGNOSIS — D50.9 IRON DEFICIENCY ANEMIA, UNSPECIFIED IRON DEFICIENCY ANEMIA TYPE: ICD-10-CM

## 2021-01-06 DIAGNOSIS — D63.1 ANEMIA, CHRONIC RENAL FAILURE, STAGE 3 (MODERATE) (HCC): Primary | ICD-10-CM

## 2021-01-06 LAB
ANION GAP SERPL CALCULATED.3IONS-SCNC: 8.4 MMOL/L (ref 5–15)
BASOPHILS # BLD AUTO: 0.02 10*3/MM3 (ref 0–0.2)
BASOPHILS NFR BLD AUTO: 0.3 % (ref 0–1.5)
BUN SERPL-MCNC: 45 MG/DL (ref 6–20)
BUN/CREAT SERPL: 19 (ref 7.3–30)
CALCIUM SPEC-SCNC: 9.3 MG/DL (ref 8.5–10.2)
CHLORIDE SERPL-SCNC: 104 MMOL/L (ref 98–107)
CO2 SERPL-SCNC: 25.6 MMOL/L (ref 22–29)
CREAT SERPL-MCNC: 2.37 MG/DL (ref 0.7–1.3)
DEPRECATED RDW RBC AUTO: 41.3 FL (ref 37–54)
EOSINOPHIL # BLD AUTO: 0.23 10*3/MM3 (ref 0–0.4)
EOSINOPHIL NFR BLD AUTO: 3.3 % (ref 0.3–6.2)
ERYTHROCYTE [DISTWIDTH] IN BLOOD BY AUTOMATED COUNT: 13.3 % (ref 12.3–15.4)
FERRITIN SERPL-MCNC: 415.2 NG/ML (ref 30–400)
GFR SERPL CREATININE-BSD FRML MDRD: 28 ML/MIN/1.73
GLUCOSE SERPL-MCNC: 228 MG/DL (ref 74–124)
HCT VFR BLD AUTO: 33.7 % (ref 37.5–51)
HGB BLD-MCNC: 10.7 G/DL (ref 13–17.7)
IMM GRANULOCYTES # BLD AUTO: 0.04 10*3/MM3 (ref 0–0.05)
IMM GRANULOCYTES NFR BLD AUTO: 0.6 % (ref 0–0.5)
IRON 24H UR-MRATE: 63 MCG/DL (ref 59–158)
IRON SATN MFR SERPL: 21 % (ref 14–48)
LYMPHOCYTES # BLD AUTO: 1.2 10*3/MM3 (ref 0.7–3.1)
LYMPHOCYTES NFR BLD AUTO: 17 % (ref 19.6–45.3)
MCH RBC QN AUTO: 27.4 PG (ref 26.6–33)
MCHC RBC AUTO-ENTMCNC: 31.8 G/DL (ref 31.5–35.7)
MCV RBC AUTO: 86.2 FL (ref 79–97)
MONOCYTES # BLD AUTO: 0.53 10*3/MM3 (ref 0.1–0.9)
MONOCYTES NFR BLD AUTO: 7.5 % (ref 5–12)
NEUTROPHILS NFR BLD AUTO: 5.04 10*3/MM3 (ref 1.7–7)
NEUTROPHILS NFR BLD AUTO: 71.3 % (ref 42.7–76)
NRBC BLD AUTO-RTO: 0 /100 WBC (ref 0–0.2)
PLATELET # BLD AUTO: 220 10*3/MM3 (ref 140–450)
PMV BLD AUTO: 9.7 FL (ref 6–12)
POTASSIUM SERPL-SCNC: 4.8 MMOL/L (ref 3.5–4.7)
RBC # BLD AUTO: 3.91 10*6/MM3 (ref 4.14–5.8)
SODIUM SERPL-SCNC: 138 MMOL/L (ref 134–145)
TIBC SERPL-MCNC: 301 MCG/DL (ref 249–505)
TRANSFERRIN SERPL-MCNC: 215 MG/DL (ref 200–360)
WBC # BLD AUTO: 7.06 10*3/MM3 (ref 3.4–10.8)

## 2021-01-06 PROCEDURE — 80048 BASIC METABOLIC PNL TOTAL CA: CPT

## 2021-01-06 PROCEDURE — 85025 COMPLETE CBC W/AUTO DIFF WBC: CPT

## 2021-01-06 PROCEDURE — 99213 OFFICE O/P EST LOW 20 MIN: CPT | Performed by: INTERNAL MEDICINE

## 2021-01-06 PROCEDURE — 82728 ASSAY OF FERRITIN: CPT

## 2021-01-06 PROCEDURE — 83540 ASSAY OF IRON: CPT

## 2021-01-06 PROCEDURE — 84466 ASSAY OF TRANSFERRIN: CPT

## 2021-01-06 PROCEDURE — 36415 COLL VENOUS BLD VENIPUNCTURE: CPT

## 2021-01-06 RX ORDER — CARVEDILOL 6.25 MG/1
6.25 TABLET ORAL 2 TIMES DAILY WITH MEALS
COMMUNITY
Start: 2020-12-11 | End: 2021-05-24 | Stop reason: HOSPADM

## 2021-01-06 RX ORDER — FUROSEMIDE 20 MG/1
20 TABLET ORAL
COMMUNITY
Start: 2020-12-17 | End: 2021-05-24 | Stop reason: HOSPADM

## 2021-01-06 RX ORDER — FUROSEMIDE 40 MG/1
40 TABLET ORAL 3 TIMES WEEKLY
Status: ON HOLD | COMMUNITY
Start: 2020-11-17 | End: 2021-05-24 | Stop reason: SDUPTHER

## 2021-01-06 RX ORDER — HYDRALAZINE HYDROCHLORIDE 50 MG/1
50 TABLET, FILM COATED ORAL 3 TIMES DAILY
COMMUNITY
Start: 2020-12-17 | End: 2021-05-24 | Stop reason: HOSPADM

## 2021-01-06 RX ORDER — HYDRALAZINE HYDROCHLORIDE 25 MG/1
50 TABLET, FILM COATED ORAL 3 TIMES DAILY
Status: ON HOLD | COMMUNITY
Start: 2020-12-15 | End: 2021-05-21

## 2021-01-11 NOTE — PROGRESS NOTES
REASON FOR FOLLOW-UP:     1. Multifactorial anemia in the setting of stage III CKD and iron deficiency.  Patient is on oral iron supplement 3 times a day.   2. Persistent iron deficiency despite oral iron treatment, patient was given Injectafer 2 doses in March 2020.  3. Decreased oral iron to twice a day in October 2020 due to constipation.    HISTORY OF PRESENT ILLNESS:  The patient is a 65 y.o. male with history of chronic anemia in the setting of CKD and iron deficiency, presented today for 3-month evaluation.  Patient reports he is at baseline condition denies bleeding.  He is taking oral iron twice a day with some improvement of his constipation.  He continues on oral vitamin B12 and folic acid.    Laboratory study today on 1/6/2021 reported stable anemia with Hb 10.7, normal platelets and the WBC.  Iron study reported ferritin 450 and iron saturation 21%.      Past Medical History:   Diagnosis Date   • Acquired hypothyroidism    • Acute congestive heart failure (CMS/HCC)    • Acute respiratory failure with hypoxia (CMS/HCC)    • Acute sinusitis    • SYLVAIN (acute kidney injury) (CMS/HCC)     on CKD   • Anemia    • Arthritis    • CAD (coronary artery disease)    • Cancer (CMS/HCC)     skin   • Chronic combined systolic and diastolic congestive heart failure (CMS/HCC)    • Chronic ischemic heart disease    • CKD (chronic kidney disease)    • COPD (chronic obstructive pulmonary disease) (CMS/HCC)    • Depression    • Diabetes mellitus type 2, uncontrolled, without complications    • Disease of thyroid gland    • Fatigue    • Frequent PVCs    • Heart attack (CMS/HCC)    • History of coronary artery disease     with remote history of bypass surgery in 2001   • History of transfusion    • Hyperglycemia    • Hyperlipidemia    • Hypertension    • Hypertensive encephalopathy    • Mental status change     Acute   • YAW on CPAP    • Pleural effusion    • Pneumonia    • RBBB (right bundle branch block)    • Screening  for prostate cancer 2011   • Stroke (CMS/HCC)    • TIA (transient ischemic attack)    • Type 2 diabetes mellitus (CMS/[facility])    • Urinary retention    • Vitamin D deficiency      Past Surgical History:   Procedure Laterality Date   • APPENDECTOMY N/A 02/14/2016    Dr. Alexey Dodson   • COLONOSCOPY      over 20 years ago.  no polyps    • COLONOSCOPY N/A 9/18/2018    Procedure: COLONOSCOPY;  Surgeon: Jose Enrique Louie MD;  Location: [facility] ENDOSCOPY;  Service: Gastroenterology   • COLONOSCOPY N/A 9/19/2018    IH, EH, polyps (TAs w/low grade dysplasia)   • COLONOSCOPY N/A 6/1/2020    Procedure: COLONOSCOPY;  Surgeon: Jose Enrique Louie MD;  Location: [facility] ENDOSCOPY;  Service: Gastroenterology;  Laterality: N/A;  Pre op-Anemia, Melena, History of Polyps  Post op-To Cecum/TI, Polyp, Poor Prep   • CORONARY ARTERY BYPASS GRAFT  11/2001   • ENDOSCOPY N/A 5/29/2020    Procedure: ESOPHAGOGASTRODUODENOSCOPY with biopsies;  Surgeon: Amanda Lovelace MD;  Location: [facility] ENDOSCOPY;  Service: Gastroenterology;  Laterality: N/A;  pre-anemia, dark stools  post-esophagitis, hiatal hernia   • ENDOSCOPY N/A 9/15/2020    Procedure: ESOPHAGOGASTRODUODENOSCOPY WITH BIOPSIES;  Surgeon: Jose Enrique Louie MD;  Location: [facility] ENDOSCOPY;  Service: Gastroenterology;  Laterality: N/A;  pre: history of melena and esophagitis  post: mild esophagitis and gastritis, small hiatal hernia   • HERNIA REPAIR Left 12/05/2019   • TONSILLECTOMY     • VASECTOMY       ONCOLOGIC/HEMATOLOGIC HISTORY:  history of hypertension, congestive heart failure, diabetes who presents today for initial evaluation of chronic anemia.  The patient presents by himself today.    He notes that he was officially diagnosed with anemia about 6 months ago.  He was started on oral ferrous sulfate 325 mg twice a day at that time, but this was increased to three times a day about a month ago.  He reports having very dark and solid stools and some constipation since he  started taking the oral iron.  He reports taking a stool softener only as needed for his constipation.     He is not taking any vitamin supplements at this time.      Today, he reports feeling very exhausted, especially with taking care of his son with Autism.  He also reports difficulty sleeping at night due to this.  He also reports occasional mediastinal chest pain, but he denies any abdominal pain.    The patient is anticoagulated on Coumadin due to his history of recurrent strokes.  He denies any abnormal bleeding, such as melena or hematochezia.  He reports he has had chronic leg swelling since he had coronary bypass surgery about 9 years ago.  However, he denies history of a prior DVT/PE.     He has history of stage III CKD, and his nephrologist is Dr. Nima Huddleston.    He also has history of diabetes mellitus, and his glucose level is typically around the 150's.    The patient denies any history of alcohol or tobacco use.  He lives at home with both of his sons, and he reports that his son with Autism smokes cigarettes.  He reports his other son is on dialysis.    Per our records, the patient has had persistent anemia since August 2015.  I have reviewed laboratory studies from 8/20/2015, he had hemoglobin 12.5.  His hemoglobin electrodes over time, and most recently has been less than 10 g since 11/20/2019.  He is laboratory study on 10/8/2018 reported ferritin 43.3 ng/mL, free iron 45 TIBC 295 and iron saturation 15%.  His vitamin B12 level was 495 pg/mL.  His hemoglobin was 11.6 MCV 77.9 MCHC 31.9, with WBC 6100 and platelets 384,000 10/2/2018.     On 7/4/2019, had a repeat laboratory study reporting iron saturation 7%, free iron 23 and a TIBC 349, without a ferritin level.  Serum folate was 9.2 ng/mL and vitamin B12 at 416 pg/mL.  His hemoglobin today was 10.5, MCV 78.9 in the normal platelets 293,000 WBC 7900.  Patient has stage III chronic and will insufficiency with most recent baseline creatinine 1.8  on 1/16/2020.    Laboratory studies 2/27/2020 show anemia hemoglobin 9.8 MCV 79.5, MCHC 32.3, platelets 231,000 and the WBC 6520.    Patient given IV Injectafer x2 doses on 3/5 and 3/12/2020.  Repeat iron studies 3/26/2020 with iron saturation of 24%, TIBC 274, iron 66, ferritin 860.  Hemoglobin improving up to 11.5 as of 4/7/2020.    Patient reported on 5/19/2020 he has been taking oral iron 3 times a day, with significant constipation.  He also takes oral folic acid and vitamin B12.    The patient received IV Injectafer x 2 doses in early March and repeat iron studies done 3/26/2020 showed improvement in iron saturation of 24% and ferritin 860 ng/mL.    His hemoglobin improved to 11.5 on 4/7/2020.    Recently on 5/2/2020 his hemoglobin drops at 10.3 with MCV 82.9, MCHC 32.6, normal platelets 197,000 and a WBC 5670.      Patient takes Coumadin, his INR was therapeutic at 2.42 on 5/2/2020.    Patient reports no abnormal bleeding.      Laboratory studies, 7/14/2020, show Hb 10.2 MCV 88.6, platelets 215,000 and WBC 7290.  Iron study reported ferritin 437, iron saturation 19% with free iron 52 TIBC 267.  Chemistry lab reported creatinine 1.99, otherwise unremarkable.      Laboratory study on 10/6/2020 reported slightly improved hemoglobin 10.8, normal platelets 211,000 and a WBC 6200.  Iron study reported ferritin 329 and iron saturation 16%.     Chemistry lab on 10/6/2020 reported worsening renal function with creatinine 2.67, and elevated glucose 170.  Normal liver function panel and electrolytes.       MEDICATIONS    Current Outpatient Medications:   •  aspirin 81 MG EC tablet, Take 81 mg by mouth Daily., Disp: , Rfl:   •  atorvastatin (LIPITOR) 40 MG tablet, Take 40 mg by mouth Every Night., Disp: , Rfl:   •  buPROPion XL (WELLBUTRIN XL) 150 MG 24 hr tablet, Take 150 mg by mouth Daily., Disp: , Rfl:   •  carvedilol (COREG) 6.25 MG tablet, , Disp: , Rfl:   •  Cholecalciferol (VITAMIN D3) 5000 units capsule capsule,  Take 5,000 Units by mouth Daily., Disp: , Rfl:   •  ferrous sulfate 325 (65 FE) MG tablet, Take 325 mg by mouth 3 (Three) Times a Day With Meals., Disp: , Rfl:   •  folic acid (FOLVITE) 1 MG tablet, Take 1 tablet by mouth Daily., Disp: 90 tablet, Rfl: 3  •  furosemide (LASIX) 20 MG tablet, , Disp: , Rfl:   •  furosemide (LASIX) 40 MG tablet, , Disp: , Rfl:   •  hydrALAZINE (APRESOLINE) 25 MG tablet, Take 25 mg by mouth Daily., Disp: , Rfl:   •  hydrALAZINE (APRESOLINE) 50 MG tablet, , Disp: , Rfl:   •  Insulin Glargine (BASAGLAR KWIKPEN) 100 UNIT/ML injection pen, Inject 8 Units under the skin into the appropriate area as directed Every Night. Patient says he does not take consistently, Disp: , Rfl:   •  insulin glulisine (APIDRA) 100 UNIT/ML injection, Inject 2 Units under the skin into the appropriate area as directed 3 (Three) Times a Day Before Meals. Patient says he does not take consistently, Disp: , Rfl:   •  lansoprazole (PREVACID) 30 MG capsule, Take 1 capsule by mouth 2 (Two) Times a Day., Disp: 28 capsule, Rfl: 0  •  levothyroxine (SYNTHROID, LEVOTHROID) 75 MCG tablet, Take 75 mcg by mouth Daily., Disp: , Rfl:   •  lisinopril (PRINIVIL,ZESTRIL) 20 MG tablet, Take 1 tablet by mouth Daily., Disp: 90 tablet, Rfl: 3  •  metoprolol succinate XL (TOPROL-XL) 50 MG 24 hr tablet, Take 1 tablet by mouth Daily., Disp: 90 tablet, Rfl: 3  •  pantoprazole (PROTONIX) 40 MG EC tablet, Take 1 tablet by mouth Daily., Disp: 30 tablet, Rfl: 0  •  tamsulosin (FLOMAX) 0.4 MG capsule 24 hr capsule, Take 1 capsule by mouth Every Night., Disp: , Rfl:   •  vitamin B-12 (CYANOCOBALAMIN) 1000 MCG tablet, Take 1 tablet by mouth Daily., Disp: 90 tablet, Rfl: 3    ALLERGIES:     Allergies   Allergen Reactions   • Prozac [Fluoxetine Hcl] Other (See Comments)     shaking       SOCIAL HISTORY:       Social History     Socioeconomic History   • Marital status:      Spouse name: Lissette   • Number of children: 3   • Years of education:  college   • Highest education level: Not on file   Occupational History     Employer: RETIRED   Tobacco Use   • Smoking status: Never Smoker   • Smokeless tobacco: Never Used   • Tobacco comment: daily caffeine   Substance and Sexual Activity   • Alcohol use: No   • Drug use: No   • Sexual activity: Defer   College education, retired  and .      FAMILY HISTORY:   Father  of lung cancer at age 65.  Mother  of accident from a fall at age of 93.  Brother was diagnosed of liver cancer at age 45, passed away at about age of 50, he also had liver cirrhosis.  Patient has 3 children, one his kidney failure on dialysis with diabetes.  Another son he has autism.  Eldest child is in good health condition.    Family History   Problem Relation Age of Onset   • Diabetes Mother    • Hypertension Mother    • Macular degeneration Mother    • Alcohol abuse Father    • Cancer Father         lung,  age 65   • Heart disease Father    • Alcohol abuse Brother    • Cirrhosis Brother          age 50   • Liver cancer Brother 45   • Diabetes Son    • Kidney failure Son    • Autism Son        REVIEW OF SYSTEMS:  Review of Systems   Constitutional: Positive for fatigue. Negative for activity change, chills, diaphoresis, fever and unexpected weight change.   HENT: Negative for hearing loss, nosebleeds and sore throat.    Eyes: Positive for visual disturbance (Poor vision). Negative for pain.   Respiratory: Positive for shortness of breath (Exertional dyspnea). Negative for cough and wheezing.    Cardiovascular: Positive for chest pain (occasional) and leg swelling (chronic, improved). Negative for palpitations.   Gastrointestinal: Positive for constipation (Improved). Negative for abdominal pain, blood in stool, diarrhea, nausea and vomiting.   Endocrine: Positive for cold intolerance. Negative for heat intolerance, polydipsia and polyuria.   Genitourinary: Negative for difficulty  "urinating, dysuria and hematuria.   Musculoskeletal: Negative for arthralgias, joint swelling and myalgias.   Skin: Negative for color change and pallor.        Pruritus without skin rashes.   Allergic/Immunologic: Negative for environmental allergies and immunocompromised state.   Neurological: Positive for dizziness. Negative for syncope and numbness.   Hematological: Does not bruise/bleed easily.   Psychiatric/Behavioral: Positive for sleep disturbance (difficulty sleeping). Negative for agitation and dysphoric mood. The patient is not nervous/anxious.               Vitals:    01/06/21 1426   BP: 130/78   Pulse: 56   Resp: 16   Temp: 97.8 °F (36.6 °C)   SpO2: 98%   Weight: 82.1 kg (181 lb)   Height: 180.3 cm (70.98\")   PainSc: 0-No pain        PHYSICAL EXAM:      GENERAL:  Well-developed, well-nourished male in no acute distress.   SKIN:  Warm, dry without rashes, purpura or petechiae.  HEAD:  Normocephalic.  EYES:  Pupils equal, round.  Conjunctivae normal.  NOSE:  Patient wears mask due to the pandemic coronavirus infection.   NECK:  Supple; no thyromegaly or masses.  CHEST: Normal respiratory effort.  Lungs clear to auscultation. Good airflow.  CARDIAC:  Regular rate and rhythm without murmurs. Normal S1,S2.  ABDOMEN:  Soft, nontender with no organomegaly or masses.  EXTREMITIES:  No clubbing, cyanosis, bilateral 1+ LE.   NEUROLOGICAL:  Cranial Nerves II-XII grossly intact.  PSYCHIATRIC:  Normal affect and mood.        RECENT LABS:    Lab Results   Component Value Date    NEUTROABS 5.04 01/06/2021      Lab Results   Component Value Date    WBC 7.06 01/06/2021    HGB 10.7 (L) 01/06/2021    HCT 33.7 (L) 01/06/2021    MCV 86.2 01/06/2021     01/06/2021     Lab Results   Component Value Date    GLUCOSE 228 (H) 01/06/2021    BUN 45 (H) 01/06/2021    CREATININE 2.37 (C) 01/06/2021    EGFRIFNONA 28 (L) 01/06/2021    EGFRIFAFRI 74 12/12/2017    BCR 19.0 01/06/2021    K 4.8 (H) 01/06/2021    CO2 25.6 01/06/2021 "    CALCIUM 9.3 01/06/2021    PROTENTOTREF 6.7 12/12/2017    ALBUMIN 3.80 10/06/2020    LABIL2 1.5 11/20/2019    AST 25 10/06/2020    ALT 27 10/06/2020        Lab Results   Component Value Date    IRON 63 01/06/2021    TIBC 301 01/06/2021    FERRITIN 415.20 (H) 01/06/2021   Iron saturation 21% on 1/6/2021        Assessment/Plan     ASSESSMENT:  1.  Chronic anemia with evidence of iron deficiency in the setting of stage III chronic renal insufficiency.    · Per our records, the patient has had persistent anemia since August 2015.  We have reviewed laboratory studies for the past 4 years, with documented iron deficiency.    · He was started on oral ferrous sulfate 325 mg twice daily about 6 months ago, but this was increased to three times a day as of January 2020. Laboratory studies today, 2/27/2020, show persistent anemia.  We will also check his iron studies today.  This patient likely has anemia with multiple factorial including iron deficiency, and the stage III renal insufficiency more other things.  He is a candidate for HAI such as Procrit injection, however iron deficiency needs correcting first.  Patient also started on oral B12 and folic acid.  · Patient receiving IV Injectafer x2 doses on 3/5 and 3/12/2020.   · Repeat iron studies 3/26/2020 showing repletion of iron with iron saturation of 24%, ferritin 860.  Hemoglobin up to 10.6.  · Patient reviewed 4/7/2020.  Hemoglobin has improved further up to 11.5.  We discussed that this point he has responded well to IV iron with significant improvement in his hemoglobin at this time he is not a candidate for Procrit.  We discussed that if his hemoglobin were to drop below 10 we could then proceed with Procrit injections.    · Hemoglobin decreased at 10.3 on 5/2/2020.   · On 5/19/2020 patient reports no bleeding.  He is on Coumadin anticoagulation.  Patient reports having significant constipation on oral iron 3 times a day.  We discussed and asked him to decrease  to twice a day.  · 7/14/2020 Hemoglobin is 10.2.   · On 10/6/2020, hemoglobin 10.8, free iron 48 TIBC 298 and iron saturation 16% with a ferritin 329 ng/mL.   · Lab study 1/6/2021 reported hemoglobin 10.7, ferritin 450 and iron saturation 21%.  Continue oral iron twice a day.    2.  History of CKD, stage III.  Followed by nephrology, Dr. Nima Huddleston.  · Worsening renal function with creatinine 2.67 on 10/6/2020, has acute on chronic renal insufficiency.  This is probably due to Lasix.  I asked patient to hold Lasix and lisinopril for 3 days because of elevated creatinine level.  He should follow up with his nephrologist Dr. Huddleston.   · Improved creatinine 2.37 on 1/6/2021.  Patient will continue follow-up with nephrology service.    PLAN:  1. Patient will continue to take Ferrous Sulfate 2-3 times daily.  2. Continue follow-up with Dr. Huddleston for CKD.    3. Continue oral folic acid and B12 daily.   4. Continue anticoagulation with Coumadin.   5. Patient will return in 6 months for follow-up with me including labs CBC, BMP, ferritin and iron profile.       BOOGIE CABRALES M.D., Ph.D.    1/6/2021    CC:    Amber Murguia M.D.   Jerome Diallo M.D.   GUILLERMINA Huddleston M.D.

## 2021-02-09 RX ORDER — LANOLIN ALCOHOL/MO/W.PET/CERES
CREAM (GRAM) TOPICAL
Qty: 205 TABLET | Refills: 0 | Status: SHIPPED | OUTPATIENT
Start: 2021-02-09 | End: 2022-02-04 | Stop reason: HOSPADM

## 2021-02-09 RX ORDER — FOLIC ACID 1 MG/1
TABLET ORAL
Qty: 205 TABLET | Refills: 0 | Status: SHIPPED | OUTPATIENT
Start: 2021-02-09 | End: 2022-02-04 | Stop reason: HOSPADM

## 2021-02-12 ENCOUNTER — TELEPHONE (OUTPATIENT)
Dept: ONCOLOGY | Facility: CLINIC | Age: 66
End: 2021-02-12

## 2021-02-12 NOTE — TELEPHONE ENCOUNTER
Caller: PETE    Relationship to patient: [facility]    Best call back number: [phone]    [facility] NORMALLY HAS RX OF levothyroxine (SYNTHROID, LEVOTHROID) 75 MCG  AND buPROPion XL (WELLBUTRIN XL) 150 MG FILLED BY DR GREER, BUT PHARMACY HAS BEEN UNABLE TO REACH HER OFFICE. PHARMACY IS ASKING IF DR CABRALES COULD SIGN OFF ON HAVING THESE REFILLED? PLEASE CALL BACK WHEN POSSIBLE

## 2021-02-12 NOTE — TELEPHONE ENCOUNTER
Returned call to pharmacy and to patient informing them that pts prescribing physician needs to refill his levothyroxine and wellbutrin  Left VM  message at pharmacy and with pt

## 2021-03-16 ENCOUNTER — BULK ORDERING (OUTPATIENT)
Dept: CASE MANAGEMENT | Facility: OTHER | Age: 66
End: 2021-03-16

## 2021-03-16 DIAGNOSIS — Z23 IMMUNIZATION DUE: ICD-10-CM

## 2021-05-20 ENCOUNTER — APPOINTMENT (OUTPATIENT)
Dept: GENERAL RADIOLOGY | Facility: HOSPITAL | Age: 66
End: 2021-05-20

## 2021-05-20 ENCOUNTER — APPOINTMENT (OUTPATIENT)
Dept: CT IMAGING | Facility: HOSPITAL | Age: 66
End: 2021-05-20

## 2021-05-20 ENCOUNTER — HOSPITAL ENCOUNTER (INPATIENT)
Facility: HOSPITAL | Age: 66
LOS: 4 days | Discharge: HOME | End: 2021-05-24
Attending: EMERGENCY MEDICINE | Admitting: INTERNAL MEDICINE

## 2021-05-20 DIAGNOSIS — D64.9 ANEMIA, UNSPECIFIED TYPE: ICD-10-CM

## 2021-05-20 DIAGNOSIS — R47.01 EXPRESSIVE APHASIA: Primary | ICD-10-CM

## 2021-05-20 DIAGNOSIS — I50.9 ACUTE CONGESTIVE HEART FAILURE, UNSPECIFIED HEART FAILURE TYPE (HCC): ICD-10-CM

## 2021-05-20 LAB
ALBUMIN SERPL-MCNC: 3.3 G/DL (ref 3.5–5.2)
ALBUMIN/GLOB SERPL: 1.4 G/DL
ALP SERPL-CCNC: 81 U/L (ref 39–117)
ALT SERPL W P-5'-P-CCNC: 9 U/L (ref 1–41)
ANION GAP SERPL CALCULATED.3IONS-SCNC: 6 MMOL/L (ref 5–15)
AST SERPL-CCNC: 9 U/L (ref 1–40)
BASOPHILS # BLD AUTO: 0 10*3/MM3 (ref 0–0.2)
BASOPHILS NFR BLD AUTO: 0 % (ref 0–1.5)
BILIRUB SERPL-MCNC: 0.2 MG/DL (ref 0–1.2)
BUN SERPL-MCNC: 33 MG/DL (ref 8–23)
BUN/CREAT SERPL: 15.4 (ref 7–25)
CALCIUM SPEC-SCNC: 8 MG/DL (ref 8.6–10.5)
CHLORIDE SERPL-SCNC: 111 MMOL/L (ref 98–107)
CO2 SERPL-SCNC: 24 MMOL/L (ref 22–29)
CREAT SERPL-MCNC: 2.14 MG/DL (ref 0.76–1.27)
DEPRECATED RDW RBC AUTO: 39.7 FL (ref 37–54)
EOSINOPHIL # BLD AUTO: 0.24 10*3/MM3 (ref 0–0.4)
EOSINOPHIL NFR BLD AUTO: 4.3 % (ref 0.3–6.2)
ERYTHROCYTE [DISTWIDTH] IN BLOOD BY AUTOMATED COUNT: 13.1 % (ref 12.3–15.4)
GFR SERPL CREATININE-BSD FRML MDRD: 31 ML/MIN/1.73
GLOBULIN UR ELPH-MCNC: 2.3 GM/DL
GLUCOSE BLDC GLUCOMTR-MCNC: 212 MG/DL (ref 70–130)
GLUCOSE SERPL-MCNC: 183 MG/DL (ref 65–99)
HCT VFR BLD AUTO: 23.3 % (ref 37.5–51)
HGB BLD-MCNC: 7.1 G/DL (ref 13–17.7)
IMM GRANULOCYTES # BLD AUTO: 0.03 10*3/MM3 (ref 0–0.05)
IMM GRANULOCYTES NFR BLD AUTO: 0.5 % (ref 0–0.5)
LYMPHOCYTES # BLD AUTO: 0.9 10*3/MM3 (ref 0.7–3.1)
LYMPHOCYTES NFR BLD AUTO: 16.1 % (ref 19.6–45.3)
MCH RBC QN AUTO: 25.8 PG (ref 26.6–33)
MCHC RBC AUTO-ENTMCNC: 30.5 G/DL (ref 31.5–35.7)
MCV RBC AUTO: 84.7 FL (ref 79–97)
MONOCYTES # BLD AUTO: 0.54 10*3/MM3 (ref 0.1–0.9)
MONOCYTES NFR BLD AUTO: 9.7 % (ref 5–12)
NEUTROPHILS NFR BLD AUTO: 3.87 10*3/MM3 (ref 1.7–7)
NEUTROPHILS NFR BLD AUTO: 69.4 % (ref 42.7–76)
NRBC BLD AUTO-RTO: 0 /100 WBC (ref 0–0.2)
NT-PROBNP SERPL-MCNC: ABNORMAL PG/ML (ref 0–900)
PLATELET # BLD AUTO: 201 10*3/MM3 (ref 140–450)
PMV BLD AUTO: 11 FL (ref 6–12)
POTASSIUM SERPL-SCNC: 4.1 MMOL/L (ref 3.5–5.2)
PROT SERPL-MCNC: 5.6 G/DL (ref 6–8.5)
RBC # BLD AUTO: 2.75 10*6/MM3 (ref 4.14–5.8)
SODIUM SERPL-SCNC: 141 MMOL/L (ref 136–145)
TROPONIN T SERPL-MCNC: 0.08 NG/ML (ref 0–0.03)
WBC # BLD AUTO: 5.58 10*3/MM3 (ref 3.4–10.8)

## 2021-05-20 PROCEDURE — 85045 AUTOMATED RETICULOCYTE COUNT: CPT | Performed by: NURSE PRACTITIONER

## 2021-05-20 PROCEDURE — 93005 ELECTROCARDIOGRAM TRACING: CPT | Performed by: EMERGENCY MEDICINE

## 2021-05-20 PROCEDURE — 82962 GLUCOSE BLOOD TEST: CPT

## 2021-05-20 PROCEDURE — U0005 INFEC AGEN DETEC AMPLI PROBE: HCPCS | Performed by: EMERGENCY MEDICINE

## 2021-05-20 PROCEDURE — 93010 ELECTROCARDIOGRAM REPORT: CPT | Performed by: INTERNAL MEDICINE

## 2021-05-20 PROCEDURE — 99285 EMERGENCY DEPT VISIT HI MDM: CPT

## 2021-05-20 PROCEDURE — 82728 ASSAY OF FERRITIN: CPT | Performed by: NURSE PRACTITIONER

## 2021-05-20 PROCEDURE — 84484 ASSAY OF TROPONIN QUANT: CPT | Performed by: EMERGENCY MEDICINE

## 2021-05-20 PROCEDURE — 84466 ASSAY OF TRANSFERRIN: CPT | Performed by: NURSE PRACTITIONER

## 2021-05-20 PROCEDURE — 83540 ASSAY OF IRON: CPT | Performed by: NURSE PRACTITIONER

## 2021-05-20 PROCEDURE — 71045 X-RAY EXAM CHEST 1 VIEW: CPT

## 2021-05-20 PROCEDURE — 83880 ASSAY OF NATRIURETIC PEPTIDE: CPT | Performed by: EMERGENCY MEDICINE

## 2021-05-20 PROCEDURE — 70450 CT HEAD/BRAIN W/O DYE: CPT

## 2021-05-20 PROCEDURE — U0004 COV-19 TEST NON-CDC HGH THRU: HCPCS | Performed by: EMERGENCY MEDICINE

## 2021-05-20 PROCEDURE — 85025 COMPLETE CBC W/AUTO DIFF WBC: CPT | Performed by: EMERGENCY MEDICINE

## 2021-05-20 PROCEDURE — 80053 COMPREHEN METABOLIC PANEL: CPT | Performed by: EMERGENCY MEDICINE

## 2021-05-20 RX ORDER — FUROSEMIDE 10 MG/ML
40 INJECTION INTRAMUSCULAR; INTRAVENOUS ONCE
Status: COMPLETED | OUTPATIENT
Start: 2021-05-20 | End: 2021-05-21

## 2021-05-20 RX ORDER — SODIUM CHLORIDE 0.9 % (FLUSH) 0.9 %
10 SYRINGE (ML) INJECTION AS NEEDED
Status: DISCONTINUED | OUTPATIENT
Start: 2021-05-20 | End: 2021-05-24 | Stop reason: HOSPADM

## 2021-05-21 ENCOUNTER — APPOINTMENT (OUTPATIENT)
Dept: CARDIOLOGY | Facility: HOSPITAL | Age: 66
End: 2021-05-21

## 2021-05-21 ENCOUNTER — APPOINTMENT (OUTPATIENT)
Dept: MRI IMAGING | Facility: HOSPITAL | Age: 66
End: 2021-05-21

## 2021-05-21 PROBLEM — I50.33 ACUTE ON CHRONIC DIASTOLIC HEART FAILURE (HCC): Status: ACTIVE | Noted: 2018-09-21

## 2021-05-21 PROBLEM — R53.81 DEBILITY: Status: ACTIVE | Noted: 2020-05-25

## 2021-05-21 PROBLEM — I63.9 CVA (CEREBRAL VASCULAR ACCIDENT) (HCC): Status: ACTIVE | Noted: 2021-05-20

## 2021-05-21 LAB
ABO GROUP BLD: NORMAL
ALBUMIN SERPL-MCNC: 3.4 G/DL (ref 3.5–5.2)
ALBUMIN/GLOB SERPL: 1.5 G/DL
ALP SERPL-CCNC: 79 U/L (ref 39–117)
ALT SERPL W P-5'-P-CCNC: 9 U/L (ref 1–41)
AMPHET+METHAMPHET UR QL: NEGATIVE
ANION GAP SERPL CALCULATED.3IONS-SCNC: 9.3 MMOL/L (ref 5–15)
AORTIC DIMENSIONLESS INDEX: 0.7 (DI)
AST SERPL-CCNC: 10 U/L (ref 1–40)
BARBITURATES UR QL SCN: NEGATIVE
BENZODIAZ UR QL SCN: NEGATIVE
BH CV ECHO MEAS - AO MAX PG (FULL): 2.2 MMHG
BH CV ECHO MEAS - AO MAX PG: 4.2 MMHG
BH CV ECHO MEAS - AO MEAN PG (FULL): 2 MMHG
BH CV ECHO MEAS - AO MEAN PG: 3 MMHG
BH CV ECHO MEAS - AO ROOT AREA (BSA CORRECTED): 1.6
BH CV ECHO MEAS - AO ROOT AREA: 8.6 CM^2
BH CV ECHO MEAS - AO ROOT DIAM: 3.3 CM
BH CV ECHO MEAS - AO V2 MAX: 103 CM/SEC
BH CV ECHO MEAS - AO V2 MEAN: 74.5 CM/SEC
BH CV ECHO MEAS - AO V2 VTI: 24.5 CM
BH CV ECHO MEAS - ASC AORTA: 2.8 CM
BH CV ECHO MEAS - AVA(I,A): 2.3 CM^2
BH CV ECHO MEAS - AVA(I,D): 2.3 CM^2
BH CV ECHO MEAS - AVA(V,A): 2.2 CM^2
BH CV ECHO MEAS - AVA(V,D): 2.2 CM^2
BH CV ECHO MEAS - BSA(HAYCOCK): 2.1 M^2
BH CV ECHO MEAS - BSA: 2 M^2
BH CV ECHO MEAS - BZI_BMI: 24.7 KILOGRAMS/M^2
BH CV ECHO MEAS - BZI_METRIC_HEIGHT: 182.9 CM
BH CV ECHO MEAS - BZI_METRIC_WEIGHT: 82.6 KG
BH CV ECHO MEAS - EDV(CUBED): 117.6 ML
BH CV ECHO MEAS - EDV(MOD-SP2): 142 ML
BH CV ECHO MEAS - EDV(MOD-SP4): 98 ML
BH CV ECHO MEAS - EDV(TEICH): 112.8 ML
BH CV ECHO MEAS - EF(CUBED): 66.6 %
BH CV ECHO MEAS - EF(MOD-BP): 47.9 %
BH CV ECHO MEAS - EF(MOD-SP2): 46.5 %
BH CV ECHO MEAS - EF(MOD-SP4): 49 %
BH CV ECHO MEAS - EF(TEICH): 58 %
BH CV ECHO MEAS - ESV(CUBED): 39.3 ML
BH CV ECHO MEAS - ESV(MOD-SP2): 76 ML
BH CV ECHO MEAS - ESV(MOD-SP4): 50 ML
BH CV ECHO MEAS - ESV(TEICH): 47.4 ML
BH CV ECHO MEAS - FS: 30.6 %
BH CV ECHO MEAS - IVS/LVPW: 1.1
BH CV ECHO MEAS - IVSD: 1 CM
BH CV ECHO MEAS - LAT PEAK E' VEL: 4 CM/SEC
BH CV ECHO MEAS - LV DIASTOLIC VOL/BSA (35-75): 47.9 ML/M^2
BH CV ECHO MEAS - LV MASS(C)D: 164.3 GRAMS
BH CV ECHO MEAS - LV MASS(C)DI: 80.3 GRAMS/M^2
BH CV ECHO MEAS - LV MAX PG: 2 MMHG
BH CV ECHO MEAS - LV MEAN PG: 1 MMHG
BH CV ECHO MEAS - LV SYSTOLIC VOL/BSA (12-30): 24.4 ML/M^2
BH CV ECHO MEAS - LV V1 MAX: 70.9 CM/SEC
BH CV ECHO MEAS - LV V1 MEAN: 45.8 CM/SEC
BH CV ECHO MEAS - LV V1 VTI: 17.6 CM
BH CV ECHO MEAS - LVIDD: 4.9 CM
BH CV ECHO MEAS - LVIDS: 3.4 CM
BH CV ECHO MEAS - LVLD AP2: 9.2 CM
BH CV ECHO MEAS - LVLD AP4: 8.6 CM
BH CV ECHO MEAS - LVLS AP2: 9.1 CM
BH CV ECHO MEAS - LVLS AP4: 8.6 CM
BH CV ECHO MEAS - LVOT AREA (M): 3.1 CM^2
BH CV ECHO MEAS - LVOT AREA: 3.1 CM^2
BH CV ECHO MEAS - LVOT DIAM: 2 CM
BH CV ECHO MEAS - LVPWD: 0.9 CM
BH CV ECHO MEAS - MED PEAK E' VEL: 4.5 CM/SEC
BH CV ECHO MEAS - MR MAX PG: 76.4 MMHG
BH CV ECHO MEAS - MR MAX VEL: 437 CM/SEC
BH CV ECHO MEAS - MV A DUR: 0.13 SEC
BH CV ECHO MEAS - MV A MAX VEL: 27.9 CM/SEC
BH CV ECHO MEAS - MV DEC SLOPE: 575 CM/SEC^2
BH CV ECHO MEAS - MV DEC TIME: 157 SEC
BH CV ECHO MEAS - MV E MAX VEL: 87 CM/SEC
BH CV ECHO MEAS - MV E/A: 3.1
BH CV ECHO MEAS - MV MAX PG: 5.4 MMHG
BH CV ECHO MEAS - MV MEAN PG: 2 MMHG
BH CV ECHO MEAS - MV P1/2T MAX VEL: 113.5 CM/SEC
BH CV ECHO MEAS - MV P1/2T: 57.8 MSEC
BH CV ECHO MEAS - MV V2 MAX: 115.5 CM/SEC
BH CV ECHO MEAS - MV V2 MEAN: 52.6 CM/SEC
BH CV ECHO MEAS - MV V2 VTI: 35.4 CM
BH CV ECHO MEAS - MVA P1/2T LCG: 1.9 CM^2
BH CV ECHO MEAS - MVA(P1/2T): 3.8 CM^2
BH CV ECHO MEAS - MVA(VTI): 1.6 CM^2
BH CV ECHO MEAS - RAP SYSTOLE: 8 MMHG
BH CV ECHO MEAS - SI(AO): 102.4 ML/M^2
BH CV ECHO MEAS - SI(CUBED): 38.3 ML/M^2
BH CV ECHO MEAS - SI(LVOT): 27 ML/M^2
BH CV ECHO MEAS - SI(MOD-SP2): 32.2 ML/M^2
BH CV ECHO MEAS - SI(MOD-SP4): 23.5 ML/M^2
BH CV ECHO MEAS - SI(TEICH): 31.9 ML/M^2
BH CV ECHO MEAS - SV(AO): 209.5 ML
BH CV ECHO MEAS - SV(CUBED): 78.3 ML
BH CV ECHO MEAS - SV(LVOT): 55.3 ML
BH CV ECHO MEAS - SV(MOD-SP2): 66 ML
BH CV ECHO MEAS - SV(MOD-SP4): 48 ML
BH CV ECHO MEAS - SV(TEICH): 65.4 ML
BH CV ECHO MEAS - TAPSE (>1.6): 1.9 CM
BH CV ECHO MEASUREMENTS AVERAGE E/E' RATIO: 20.47
BH CV XLRA - RV BASE: 3.5 CM
BH CV XLRA - TDI S': 7 CM/SEC
BH CV XLRA MEAS LEFT DIST CCA EDV: -10.9 CM/SEC
BH CV XLRA MEAS LEFT DIST CCA PSV: -58.3 CM/SEC
BH CV XLRA MEAS LEFT DIST ICA EDV: -20.1 CM/SEC
BH CV XLRA MEAS LEFT DIST ICA PSV: -68.2 CM/SEC
BH CV XLRA MEAS LEFT MID ICA EDV: -19.8 CM/SEC
BH CV XLRA MEAS LEFT MID ICA PSV: -91.3 CM/SEC
BH CV XLRA MEAS LEFT PROX CCA EDV: 14.2 CM/SEC
BH CV XLRA MEAS LEFT PROX CCA PSV: 67.3 CM/SEC
BH CV XLRA MEAS LEFT PROX ECA EDV: -6.6 CM/SEC
BH CV XLRA MEAS LEFT PROX ECA PSV: -92.1 CM/SEC
BH CV XLRA MEAS LEFT PROX ICA EDV: -20.5 CM/SEC
BH CV XLRA MEAS LEFT PROX ICA PSV: -67.9 CM/SEC
BH CV XLRA MEAS LEFT PROX SCLA PSV: 138 CM/SEC
BH CV XLRA MEAS LEFT VERTEBRAL A EDV: -5.5 CM/SEC
BH CV XLRA MEAS LEFT VERTEBRAL A PSV: -31.3 CM/SEC
BH CV XLRA MEAS RIGHT DIST CCA EDV: -11.4 CM/SEC
BH CV XLRA MEAS RIGHT DIST CCA PSV: -55.5 CM/SEC
BH CV XLRA MEAS RIGHT DIST ICA EDV: -17.1 CM/SEC
BH CV XLRA MEAS RIGHT DIST ICA PSV: -58.3 CM/SEC
BH CV XLRA MEAS RIGHT MID ICA EDV: -19 CM/SEC
BH CV XLRA MEAS RIGHT MID ICA PSV: -64 CM/SEC
BH CV XLRA MEAS RIGHT PROX CCA EDV: 11.9 CM/SEC
BH CV XLRA MEAS RIGHT PROX CCA PSV: 53.6 CM/SEC
BH CV XLRA MEAS RIGHT PROX ECA PSV: -66.9 CM/SEC
BH CV XLRA MEAS RIGHT PROX ICA EDV: -12.8 CM/SEC
BH CV XLRA MEAS RIGHT PROX ICA PSV: -57.8 CM/SEC
BH CV XLRA MEAS RIGHT PROX SCLA PSV: 54.6 CM/SEC
BH CV XLRA MEAS RIGHT VERTEBRAL A EDV: -7.6 CM/SEC
BH CV XLRA MEAS RIGHT VERTEBRAL A PSV: -37.5 CM/SEC
BILIRUB SERPL-MCNC: 0.2 MG/DL (ref 0–1.2)
BLD GP AB SCN SERPL QL: NEGATIVE
BUN SERPL-MCNC: 35 MG/DL (ref 8–23)
BUN/CREAT SERPL: 14.8 (ref 7–25)
CALCIUM SPEC-SCNC: 8.6 MG/DL (ref 8.6–10.5)
CANNABINOIDS SERPL QL: NEGATIVE
CHLORIDE SERPL-SCNC: 107 MMOL/L (ref 98–107)
CHOLEST SERPL-MCNC: 166 MG/DL (ref 0–200)
CO2 SERPL-SCNC: 23.7 MMOL/L (ref 22–29)
COCAINE UR QL: NEGATIVE
CREAT SERPL-MCNC: 2.37 MG/DL (ref 0.76–1.27)
DEPRECATED RDW RBC AUTO: 41.8 FL (ref 37–54)
ERYTHROCYTE [DISTWIDTH] IN BLOOD BY AUTOMATED COUNT: 13.4 % (ref 12.3–15.4)
FERRITIN SERPL-MCNC: 335 NG/ML (ref 30–400)
FOLATE SERPL-MCNC: >20 NG/ML (ref 4.78–24.2)
GFR SERPL CREATININE-BSD FRML MDRD: 28 ML/MIN/1.73
GLOBULIN UR ELPH-MCNC: 2.3 GM/DL
GLUCOSE BLDC GLUCOMTR-MCNC: 123 MG/DL (ref 70–130)
GLUCOSE BLDC GLUCOMTR-MCNC: 144 MG/DL (ref 70–130)
GLUCOSE BLDC GLUCOMTR-MCNC: 238 MG/DL (ref 70–130)
GLUCOSE BLDC GLUCOMTR-MCNC: 245 MG/DL (ref 70–130)
GLUCOSE SERPL-MCNC: 134 MG/DL (ref 65–99)
HBA1C MFR BLD: 8.3 % (ref 4.8–5.6)
HCT VFR BLD AUTO: 32.1 % (ref 37.5–51)
HCT VFR BLD AUTO: 32.7 % (ref 37.5–51)
HDLC SERPL-MCNC: 46 MG/DL (ref 40–60)
HEMOCCULT STL QL: NEGATIVE
HGB BLD-MCNC: 10 G/DL (ref 13–17.7)
HGB BLD-MCNC: 10.1 G/DL (ref 13–17.7)
IRON 24H UR-MRATE: 37 MCG/DL (ref 59–158)
IRON SATN MFR SERPL: 14 % (ref 20–50)
LDLC SERPL CALC-MCNC: 108 MG/DL (ref 0–100)
LDLC/HDLC SERPL: 2.34 {RATIO}
LEFT ARM BP: NORMAL MMHG
LEFT ATRIUM VOLUME INDEX: 33 ML/M2
MAXIMAL PREDICTED HEART RATE: 155 BPM
MCH RBC QN AUTO: 26.6 PG (ref 26.6–33)
MCHC RBC AUTO-ENTMCNC: 31.5 G/DL (ref 31.5–35.7)
MCV RBC AUTO: 84.7 FL (ref 79–97)
METHADONE UR QL SCN: NEGATIVE
OPIATES UR QL: NEGATIVE
OXYCODONE UR QL SCN: NEGATIVE
PLATELET # BLD AUTO: 229 10*3/MM3 (ref 140–450)
PMV BLD AUTO: 11 FL (ref 6–12)
POTASSIUM SERPL-SCNC: 4.1 MMOL/L (ref 3.5–5.2)
PROT SERPL-MCNC: 5.7 G/DL (ref 6–8.5)
QT INTERVAL: 469 MS
RBC # BLD AUTO: 3.79 10*6/MM3 (ref 4.14–5.8)
RETICS # AUTO: 0.03 10*6/MM3 (ref 0.02–0.13)
RETICS/RBC NFR AUTO: 1 % (ref 0.7–1.9)
RH BLD: POSITIVE
RIGHT ARM BP: NORMAL MMHG
SARS-COV-2 ORF1AB RESP QL NAA+PROBE: NOT DETECTED
SODIUM SERPL-SCNC: 140 MMOL/L (ref 136–145)
STRESS TARGET HR: 132 BPM
T&S EXPIRATION DATE: NORMAL
T4 FREE SERPL-MCNC: 1.47 NG/DL (ref 0.93–1.7)
TIBC SERPL-MCNC: 268 MCG/DL (ref 298–536)
TRANSFERRIN SERPL-MCNC: 180 MG/DL (ref 200–360)
TRIGL SERPL-MCNC: 61 MG/DL (ref 0–150)
TROPONIN T SERPL-MCNC: 0.08 NG/ML (ref 0–0.03)
TSH SERPL DL<=0.05 MIU/L-ACNC: 3.87 UIU/ML (ref 0.27–4.2)
VIT B12 BLD-MCNC: 1374 PG/ML (ref 211–946)
VLDLC SERPL-MCNC: 12 MG/DL (ref 5–40)
WBC # BLD AUTO: 7.2 10*3/MM3 (ref 3.4–10.8)

## 2021-05-21 PROCEDURE — 85018 HEMOGLOBIN: CPT | Performed by: NURSE PRACTITIONER

## 2021-05-21 PROCEDURE — 25010000002 FUROSEMIDE PER 20 MG: Performed by: EMERGENCY MEDICINE

## 2021-05-21 PROCEDURE — 97166 OT EVAL MOD COMPLEX 45 MIN: CPT

## 2021-05-21 PROCEDURE — 86901 BLOOD TYPING SEROLOGIC RH(D): CPT | Performed by: EMERGENCY MEDICINE

## 2021-05-21 PROCEDURE — 80307 DRUG TEST PRSMV CHEM ANLYZR: CPT | Performed by: PSYCHIATRY & NEUROLOGY

## 2021-05-21 PROCEDURE — 93306 TTE W/DOPPLER COMPLETE: CPT

## 2021-05-21 PROCEDURE — 80053 COMPREHEN METABOLIC PANEL: CPT | Performed by: NURSE PRACTITIONER

## 2021-05-21 PROCEDURE — 82962 GLUCOSE BLOOD TEST: CPT

## 2021-05-21 PROCEDURE — 70551 MRI BRAIN STEM W/O DYE: CPT

## 2021-05-21 PROCEDURE — 86850 RBC ANTIBODY SCREEN: CPT | Performed by: EMERGENCY MEDICINE

## 2021-05-21 PROCEDURE — 83036 HEMOGLOBIN GLYCOSYLATED A1C: CPT | Performed by: NURSE PRACTITIONER

## 2021-05-21 PROCEDURE — 63710000001 INSULIN LISPRO (HUMAN) PER 5 UNITS: Performed by: INTERNAL MEDICINE

## 2021-05-21 PROCEDURE — 93306 TTE W/DOPPLER COMPLETE: CPT | Performed by: INTERNAL MEDICINE

## 2021-05-21 PROCEDURE — 84484 ASSAY OF TROPONIN QUANT: CPT | Performed by: INTERNAL MEDICINE

## 2021-05-21 PROCEDURE — 63710000001 INSULIN GLARGINE PER 5 UNITS: Performed by: NURSE PRACTITIONER

## 2021-05-21 PROCEDURE — 80061 LIPID PANEL: CPT | Performed by: NURSE PRACTITIONER

## 2021-05-21 PROCEDURE — 85027 COMPLETE CBC AUTOMATED: CPT | Performed by: NURSE PRACTITIONER

## 2021-05-21 PROCEDURE — 97535 SELF CARE MNGMENT TRAINING: CPT

## 2021-05-21 PROCEDURE — 82272 OCCULT BLD FECES 1-3 TESTS: CPT | Performed by: NURSE PRACTITIONER

## 2021-05-21 PROCEDURE — 84439 ASSAY OF FREE THYROXINE: CPT | Performed by: PSYCHIATRY & NEUROLOGY

## 2021-05-21 PROCEDURE — 82746 ASSAY OF FOLIC ACID SERUM: CPT | Performed by: NURSE PRACTITIONER

## 2021-05-21 PROCEDURE — 25010000002 FUROSEMIDE PER 20 MG: Performed by: NURSE PRACTITIONER

## 2021-05-21 PROCEDURE — 99222 1ST HOSP IP/OBS MODERATE 55: CPT | Performed by: PSYCHIATRY & NEUROLOGY

## 2021-05-21 PROCEDURE — 85014 HEMATOCRIT: CPT | Performed by: NURSE PRACTITIONER

## 2021-05-21 PROCEDURE — 99222 1ST HOSP IP/OBS MODERATE 55: CPT | Performed by: INTERNAL MEDICINE

## 2021-05-21 PROCEDURE — 82607 VITAMIN B-12: CPT | Performed by: NURSE PRACTITIONER

## 2021-05-21 PROCEDURE — 93880 EXTRACRANIAL BILAT STUDY: CPT

## 2021-05-21 PROCEDURE — 36415 COLL VENOUS BLD VENIPUNCTURE: CPT | Performed by: NURSE PRACTITIONER

## 2021-05-21 PROCEDURE — 84443 ASSAY THYROID STIM HORMONE: CPT | Performed by: PSYCHIATRY & NEUROLOGY

## 2021-05-21 PROCEDURE — 86900 BLOOD TYPING SEROLOGIC ABO: CPT | Performed by: EMERGENCY MEDICINE

## 2021-05-21 RX ORDER — LISINOPRIL 10 MG/1
10 TABLET ORAL DAILY
Status: ON HOLD | COMMUNITY
End: 2021-05-24 | Stop reason: SDUPTHER

## 2021-05-21 RX ORDER — TAMSULOSIN HYDROCHLORIDE 0.4 MG/1
0.4 CAPSULE ORAL NIGHTLY
Status: DISCONTINUED | OUTPATIENT
Start: 2021-05-21 | End: 2021-05-24 | Stop reason: HOSPADM

## 2021-05-21 RX ORDER — NICOTINE POLACRILEX 4 MG
15 LOZENGE BUCCAL
Status: DISCONTINUED | OUTPATIENT
Start: 2021-05-21 | End: 2021-05-24 | Stop reason: HOSPADM

## 2021-05-21 RX ORDER — ONDANSETRON 2 MG/ML
4 INJECTION INTRAMUSCULAR; INTRAVENOUS EVERY 6 HOURS PRN
Status: DISCONTINUED | OUTPATIENT
Start: 2021-05-21 | End: 2021-05-24 | Stop reason: HOSPADM

## 2021-05-21 RX ORDER — ASPIRIN 81 MG/1
81 TABLET ORAL DAILY
Status: DISCONTINUED | OUTPATIENT
Start: 2021-05-21 | End: 2021-05-24 | Stop reason: HOSPADM

## 2021-05-21 RX ORDER — CARVEDILOL 6.25 MG/1
6.25 TABLET ORAL 2 TIMES DAILY WITH MEALS
Status: DISCONTINUED | OUTPATIENT
Start: 2021-05-21 | End: 2021-05-21

## 2021-05-21 RX ORDER — CARVEDILOL 6.25 MG/1
6.25 TABLET ORAL 2 TIMES DAILY WITH MEALS
Status: DISCONTINUED | OUTPATIENT
Start: 2021-05-21 | End: 2021-05-22

## 2021-05-21 RX ORDER — ASPIRIN 300 MG/1
300 SUPPOSITORY RECTAL DAILY
Status: DISCONTINUED | OUTPATIENT
Start: 2021-05-21 | End: 2021-05-21

## 2021-05-21 RX ORDER — INSULIN GLARGINE 100 [IU]/ML
8 INJECTION, SOLUTION SUBCUTANEOUS NIGHTLY
Status: DISCONTINUED | OUTPATIENT
Start: 2021-05-21 | End: 2021-05-22

## 2021-05-21 RX ORDER — FOLIC ACID 1 MG/1
1000 TABLET ORAL DAILY
Status: DISCONTINUED | OUTPATIENT
Start: 2021-05-21 | End: 2021-05-24 | Stop reason: HOSPADM

## 2021-05-21 RX ORDER — SODIUM CHLORIDE 0.9 % (FLUSH) 0.9 %
10 SYRINGE (ML) INJECTION EVERY 12 HOURS SCHEDULED
Status: DISCONTINUED | OUTPATIENT
Start: 2021-05-21 | End: 2021-05-24 | Stop reason: HOSPADM

## 2021-05-21 RX ORDER — CHOLECALCIFEROL (VITAMIN D3) 125 MCG
1000 CAPSULE ORAL DAILY
Status: DISCONTINUED | OUTPATIENT
Start: 2021-05-21 | End: 2021-05-24 | Stop reason: HOSPADM

## 2021-05-21 RX ORDER — ASPIRIN 325 MG
325 TABLET ORAL DAILY
Status: DISCONTINUED | OUTPATIENT
Start: 2021-05-21 | End: 2021-05-21

## 2021-05-21 RX ORDER — ACETAMINOPHEN 325 MG/1
650 TABLET ORAL EVERY 4 HOURS PRN
Status: DISCONTINUED | OUTPATIENT
Start: 2021-05-21 | End: 2021-05-24 | Stop reason: HOSPADM

## 2021-05-21 RX ORDER — INSULIN GLARGINE 100 [IU]/ML
8 INJECTION, SOLUTION SUBCUTANEOUS NIGHTLY
Status: DISCONTINUED | OUTPATIENT
Start: 2021-05-21 | End: 2021-05-21 | Stop reason: CLARIF

## 2021-05-21 RX ORDER — ATORVASTATIN CALCIUM 80 MG/1
80 TABLET, FILM COATED ORAL NIGHTLY
Status: DISCONTINUED | OUTPATIENT
Start: 2021-05-21 | End: 2021-05-24 | Stop reason: HOSPADM

## 2021-05-21 RX ORDER — ACETAMINOPHEN 650 MG/1
650 SUPPOSITORY RECTAL EVERY 4 HOURS PRN
Status: DISCONTINUED | OUTPATIENT
Start: 2021-05-21 | End: 2021-05-24 | Stop reason: HOSPADM

## 2021-05-21 RX ORDER — INSULIN LISPRO 100 [IU]/ML
0-7 INJECTION, SOLUTION INTRAVENOUS; SUBCUTANEOUS
Status: DISCONTINUED | OUTPATIENT
Start: 2021-05-21 | End: 2021-05-24 | Stop reason: HOSPADM

## 2021-05-21 RX ORDER — DEXTROSE MONOHYDRATE 25 G/50ML
25 INJECTION, SOLUTION INTRAVENOUS
Status: DISCONTINUED | OUTPATIENT
Start: 2021-05-21 | End: 2021-05-24 | Stop reason: HOSPADM

## 2021-05-21 RX ORDER — SODIUM CHLORIDE 0.9 % (FLUSH) 0.9 %
10 SYRINGE (ML) INJECTION AS NEEDED
Status: DISCONTINUED | OUTPATIENT
Start: 2021-05-21 | End: 2021-05-24 | Stop reason: HOSPADM

## 2021-05-21 RX ORDER — FUROSEMIDE 10 MG/ML
40 INJECTION INTRAMUSCULAR; INTRAVENOUS ONCE
Status: COMPLETED | OUTPATIENT
Start: 2021-05-21 | End: 2021-05-21

## 2021-05-21 RX ADMIN — ASPIRIN 325 MG: 325 TABLET ORAL at 08:31

## 2021-05-21 RX ADMIN — FOLIC ACID 1000 MCG: 1 TABLET ORAL at 14:33

## 2021-05-21 RX ADMIN — SODIUM CHLORIDE, PRESERVATIVE FREE 10 ML: 5 INJECTION INTRAVENOUS at 01:18

## 2021-05-21 RX ADMIN — SODIUM CHLORIDE, PRESERVATIVE FREE 10 ML: 5 INJECTION INTRAVENOUS at 08:32

## 2021-05-21 RX ADMIN — ATORVASTATIN CALCIUM 80 MG: 80 TABLET, FILM COATED ORAL at 01:17

## 2021-05-21 RX ADMIN — ATORVASTATIN CALCIUM 80 MG: 80 TABLET, FILM COATED ORAL at 21:22

## 2021-05-21 RX ADMIN — SODIUM CHLORIDE, PRESERVATIVE FREE 10 ML: 5 INJECTION INTRAVENOUS at 21:22

## 2021-05-21 RX ADMIN — TAMSULOSIN HYDROCHLORIDE 0.4 MG: 0.4 CAPSULE ORAL at 21:22

## 2021-05-21 RX ADMIN — INSULIN GLARGINE 8 UNITS: 100 INJECTION, SOLUTION SUBCUTANEOUS at 21:22

## 2021-05-21 RX ADMIN — FUROSEMIDE 40 MG: 10 INJECTION, SOLUTION INTRAMUSCULAR; INTRAVENOUS at 00:10

## 2021-05-21 RX ADMIN — FUROSEMIDE 40 MG: 10 INJECTION, SOLUTION INTRAMUSCULAR; INTRAVENOUS at 11:42

## 2021-05-21 RX ADMIN — APIXABAN 5 MG: 5 TABLET, FILM COATED ORAL at 21:22

## 2021-05-21 RX ADMIN — Medication 1000 MCG: at 14:33

## 2021-05-21 RX ADMIN — INSULIN LISPRO 3 UNITS: 100 INJECTION, SOLUTION INTRAVENOUS; SUBCUTANEOUS at 11:42

## 2021-05-21 RX ADMIN — APIXABAN 5 MG: 5 TABLET, FILM COATED ORAL at 14:33

## 2021-05-21 RX ADMIN — CARVEDILOL 6.25 MG: 6.25 TABLET, FILM COATED ORAL at 14:33

## 2021-05-22 PROBLEM — Q21.12 PATENT FORAMEN OVALE: Status: ACTIVE | Noted: 2021-05-22

## 2021-05-22 PROBLEM — J90 PLEURAL EFFUSION: Status: ACTIVE | Noted: 2021-05-22

## 2021-05-22 LAB
ALBUMIN SERPL-MCNC: 2.8 G/DL (ref 3.5–5.2)
ALBUMIN/GLOB SERPL: 1.2 G/DL
ALP SERPL-CCNC: 72 U/L (ref 39–117)
ALT SERPL W P-5'-P-CCNC: 10 U/L (ref 1–41)
ANION GAP SERPL CALCULATED.3IONS-SCNC: 6.1 MMOL/L (ref 5–15)
AST SERPL-CCNC: 11 U/L (ref 1–40)
BILIRUB SERPL-MCNC: 0.3 MG/DL (ref 0–1.2)
BUN SERPL-MCNC: 34 MG/DL (ref 8–23)
BUN/CREAT SERPL: 16.7 (ref 7–25)
CALCIUM SPEC-SCNC: 8.4 MG/DL (ref 8.6–10.5)
CHLORIDE SERPL-SCNC: 106 MMOL/L (ref 98–107)
CO2 SERPL-SCNC: 26.9 MMOL/L (ref 22–29)
CREAT SERPL-MCNC: 2.04 MG/DL (ref 0.76–1.27)
DEPRECATED RDW RBC AUTO: 40.1 FL (ref 37–54)
ERYTHROCYTE [DISTWIDTH] IN BLOOD BY AUTOMATED COUNT: 13.2 % (ref 12.3–15.4)
GFR SERPL CREATININE-BSD FRML MDRD: 33 ML/MIN/1.73
GLOBULIN UR ELPH-MCNC: 2.4 GM/DL
GLUCOSE BLDC GLUCOMTR-MCNC: 133 MG/DL (ref 70–130)
GLUCOSE BLDC GLUCOMTR-MCNC: 153 MG/DL (ref 70–130)
GLUCOSE BLDC GLUCOMTR-MCNC: 161 MG/DL (ref 70–130)
GLUCOSE BLDC GLUCOMTR-MCNC: 75 MG/DL (ref 70–130)
GLUCOSE SERPL-MCNC: 70 MG/DL (ref 65–99)
HCT VFR BLD AUTO: 30.1 % (ref 37.5–51)
HGB BLD-MCNC: 9.7 G/DL (ref 13–17.7)
MCH RBC QN AUTO: 26.9 PG (ref 26.6–33)
MCHC RBC AUTO-ENTMCNC: 32.2 G/DL (ref 31.5–35.7)
MCV RBC AUTO: 83.6 FL (ref 79–97)
PLATELET # BLD AUTO: 222 10*3/MM3 (ref 140–450)
PMV BLD AUTO: 10.9 FL (ref 6–12)
POTASSIUM SERPL-SCNC: 4.3 MMOL/L (ref 3.5–5.2)
PROT SERPL-MCNC: 5.2 G/DL (ref 6–8.5)
RBC # BLD AUTO: 3.6 10*6/MM3 (ref 4.14–5.8)
SODIUM SERPL-SCNC: 139 MMOL/L (ref 136–145)
WBC # BLD AUTO: 5.47 10*3/MM3 (ref 3.4–10.8)

## 2021-05-22 PROCEDURE — 63710000001 INSULIN GLARGINE PER 5 UNITS: Performed by: INTERNAL MEDICINE

## 2021-05-22 PROCEDURE — 36415 COLL VENOUS BLD VENIPUNCTURE: CPT | Performed by: NURSE PRACTITIONER

## 2021-05-22 PROCEDURE — 25010000002 FUROSEMIDE PER 20 MG: Performed by: INTERNAL MEDICINE

## 2021-05-22 PROCEDURE — 82962 GLUCOSE BLOOD TEST: CPT

## 2021-05-22 PROCEDURE — 80053 COMPREHEN METABOLIC PANEL: CPT | Performed by: NURSE PRACTITIONER

## 2021-05-22 PROCEDURE — 85027 COMPLETE CBC AUTOMATED: CPT | Performed by: NURSE PRACTITIONER

## 2021-05-22 PROCEDURE — 63710000001 INSULIN LISPRO (HUMAN) PER 5 UNITS: Performed by: INTERNAL MEDICINE

## 2021-05-22 PROCEDURE — 99233 SBSQ HOSP IP/OBS HIGH 50: CPT | Performed by: INTERNAL MEDICINE

## 2021-05-22 RX ORDER — UREA 10 %
3 LOTION (ML) TOPICAL NIGHTLY
Status: DISCONTINUED | OUTPATIENT
Start: 2021-05-22 | End: 2021-05-24 | Stop reason: HOSPADM

## 2021-05-22 RX ORDER — FUROSEMIDE 40 MG/1
40 TABLET ORAL DAILY
Status: DISCONTINUED | OUTPATIENT
Start: 2021-05-22 | End: 2021-05-24 | Stop reason: HOSPADM

## 2021-05-22 RX ORDER — INSULIN GLARGINE 100 [IU]/ML
5 INJECTION, SOLUTION SUBCUTANEOUS NIGHTLY
Status: DISCONTINUED | OUTPATIENT
Start: 2021-05-22 | End: 2021-05-23

## 2021-05-22 RX ORDER — HYDRALAZINE HYDROCHLORIDE 50 MG/1
50 TABLET, FILM COATED ORAL EVERY 8 HOURS SCHEDULED
Status: DISCONTINUED | OUTPATIENT
Start: 2021-05-22 | End: 2021-05-22

## 2021-05-22 RX ORDER — SPIRONOLACTONE 25 MG/1
25 TABLET ORAL DAILY
Status: DISCONTINUED | OUTPATIENT
Start: 2021-05-22 | End: 2021-05-24 | Stop reason: HOSPADM

## 2021-05-22 RX ORDER — LISINOPRIL 10 MG/1
10 TABLET ORAL DAILY
Status: DISCONTINUED | OUTPATIENT
Start: 2021-05-22 | End: 2021-05-22

## 2021-05-22 RX ORDER — FUROSEMIDE 10 MG/ML
40 INJECTION INTRAMUSCULAR; INTRAVENOUS ONCE
Status: COMPLETED | OUTPATIENT
Start: 2021-05-22 | End: 2021-05-22

## 2021-05-22 RX ORDER — LISINOPRIL 20 MG/1
20 TABLET ORAL DAILY
Status: DISCONTINUED | OUTPATIENT
Start: 2021-05-23 | End: 2021-05-23

## 2021-05-22 RX ORDER — CARVEDILOL 12.5 MG/1
12.5 TABLET ORAL 2 TIMES DAILY WITH MEALS
Status: DISCONTINUED | OUTPATIENT
Start: 2021-05-22 | End: 2021-05-23

## 2021-05-22 RX ORDER — HYDRALAZINE HYDROCHLORIDE 50 MG/1
50 TABLET, FILM COATED ORAL 3 TIMES DAILY PRN
Status: DISCONTINUED | OUTPATIENT
Start: 2021-05-22 | End: 2021-05-24 | Stop reason: HOSPADM

## 2021-05-22 RX ADMIN — SODIUM CHLORIDE, PRESERVATIVE FREE 10 ML: 5 INJECTION INTRAVENOUS at 08:10

## 2021-05-22 RX ADMIN — APIXABAN 5 MG: 5 TABLET, FILM COATED ORAL at 21:12

## 2021-05-22 RX ADMIN — Medication 3 MG: at 21:12

## 2021-05-22 RX ADMIN — LISINOPRIL 10 MG: 10 TABLET ORAL at 08:10

## 2021-05-22 RX ADMIN — TAMSULOSIN HYDROCHLORIDE 0.4 MG: 0.4 CAPSULE ORAL at 21:12

## 2021-05-22 RX ADMIN — HYDRALAZINE HYDROCHLORIDE 50 MG: 50 TABLET, FILM COATED ORAL at 06:52

## 2021-05-22 RX ADMIN — INSULIN GLARGINE 5 UNITS: 100 INJECTION, SOLUTION SUBCUTANEOUS at 21:13

## 2021-05-22 RX ADMIN — INSULIN LISPRO 2 UNITS: 100 INJECTION, SOLUTION INTRAVENOUS; SUBCUTANEOUS at 11:32

## 2021-05-22 RX ADMIN — CARVEDILOL 6.25 MG: 6.25 TABLET, FILM COATED ORAL at 08:10

## 2021-05-22 RX ADMIN — FUROSEMIDE 40 MG: 40 TABLET ORAL at 08:10

## 2021-05-22 RX ADMIN — CARVEDILOL 12.5 MG: 12.5 TABLET, FILM COATED ORAL at 18:10

## 2021-05-22 RX ADMIN — FOLIC ACID 1000 MCG: 1 TABLET ORAL at 08:10

## 2021-05-22 RX ADMIN — ASPIRIN 81 MG: 81 TABLET, COATED ORAL at 08:10

## 2021-05-22 RX ADMIN — ATORVASTATIN CALCIUM 80 MG: 80 TABLET, FILM COATED ORAL at 21:12

## 2021-05-22 RX ADMIN — APIXABAN 5 MG: 5 TABLET, FILM COATED ORAL at 08:10

## 2021-05-22 RX ADMIN — SPIRONOLACTONE 25 MG: 25 TABLET ORAL at 11:32

## 2021-05-22 RX ADMIN — SODIUM CHLORIDE, PRESERVATIVE FREE 10 ML: 5 INJECTION INTRAVENOUS at 21:12

## 2021-05-22 RX ADMIN — Medication 1000 MCG: at 08:10

## 2021-05-22 RX ADMIN — FUROSEMIDE 40 MG: 10 INJECTION, SOLUTION INTRAVENOUS at 11:33

## 2021-05-22 NOTE — PROGRESS NOTES
[facility] Medicine Services  PROGRESS NOTE    Patient Name: Carlito Mo  : 1955  MRN: [id number]    Date of Admission: 2021  Primary Care Physician: Florencia Caballero MD    Subjective   Subjective     CC:  Follow-up stroke.    HPI:  Patient remains relatively poor historian but is resting comfortably in bed.  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  NIH currently 1.  Patient denies new complaints.  He is unable to give me much information regarding his home medicines.    Review of Systems  No current fevers or chills  No current shortness of breath or cough  No current nausea, vomiting, or diarrhea  No current chest pain or palpitations    Objective   Objective     Vital Signs:   Temp:  [97.4 °F (36.3 °C)-97.9 °F (36.6 °C)] 97.9 °F (36.6 °C)  Heart Rate:  [58-61] 61  Resp:  [16-18] 16  BP: (151-184)/(85-96) 153/86  Total (NIH Stroke Scale): 1     Physical Exam:  Constitutional:Awake, alert  HENT: NCAT, mucous membranes moist, neck supple  Respiratory: Clear to auscultation bilaterally, respiratory effort normal, nonlabored breathing   Cardiovascular: RRR, normal radial pulses  Gastrointestinal: Positive bowel sounds, soft, nontender, nondistended  Musculoskeletal: Normal musculature for age, no lower extremity edema, BMI  Psychiatric: Flat affect, cooperative, conversational  Neurologic: Word finding difficulties improved, oriented, follows commands  Skin: No rashes or jaundice, warm      Results Reviewed:  Results from last 7 days   Lab Units 21  0531 21  0941 21  2150   WBC 10*3/mm3 5.47  --  7.20 5.58   HEMOGLOBIN g/dL 9.7* 10.0* 10.1* 7.1*   HEMATOCRIT % 30.1* 32.7* 32.1* 23.3*   PLATELETS 10*3/mm3 222  --  229 201     Results from last 7 days   Lab Units 21  0531 21  0941 21  2150   SODIUM mmol/L 139  --  140 141   POTASSIUM mmol/L 4.3  --  4.1 4.1   CHLORIDE mmol/L 106  --  107 111*   CO2 mmol/L 26.9  --  23.7 24.0   BUN mg/dL  34*  --  35* 33*   CREATININE mg/dL 2.04*  --  2.37* 2.14*   GLUCOSE mg/dL 70  --  134* 183*   CALCIUM mg/dL 8.4*  --  8.6 8.0*   ALT (SGPT) U/L 10  --  9 9   AST (SGOT) U/L 11  --  10 9   TROPONIN T ng/mL  --  0.082*  --  0.076*   PROBNP pg/mL  --   --   --  10,859.0*     Estimated Creatinine Clearance: 43.1 mL/min (A) (by C-G formula based on SCr of 2.04 mg/dL (H)).    Microbiology Results Abnormal     Procedure Component Value - Date/Time    COVID PRE-OP / PRE-PROCEDURE SCREENING ORDER (NO ISOLATION) - Swab, Nasopharynx [[id number]]  (Normal) Collected: 05/20/21 2356    Lab Status: Final result Specimen: Swab from Nasopharynx Updated: 05/21/21 1310    Narrative:      The following orders were created for panel order COVID PRE-OP / PRE-PROCEDURE SCREENING ORDER (NO ISOLATION) - Swab, Nasopharynx.  Procedure                               Abnormality         Status                     ---------                               -----------         ------                     COVID-19,APTIMA PANTHER,...[[id number]]  Normal              Final result                 Please view results for these tests on the individual orders.    COVID-19,APTIMA PANTHER,[facility]AR IN-HOUSE, NP/OP SWAB IN UTM/VTM/SALINE TRANSPORT MEDIA,24 HR TAT - Swab, Nasopharynx [[id number]]  (Normal) Collected: 05/20/21 2356    Lab Status: Final result Specimen: Swab from Nasopharynx Updated: 05/21/21 1310     COVID19 Not Detected    Narrative:      Fact sheet for providers: https://www.fda.gov/media/699691/download     Fact sheet for patients: https://www.fda.gov/media/951019/download    Test performed by RT PCR.          Imaging Results (Last 24 Hours)     ** No results found for the last 24 hours. **          Results for orders placed during the hospital encounter of 05/20/21    Adult Transthoracic Echo Complete W/ Cont if Necessary Per Protocol (With Agitated Saline)    Interpretation Summary  · There is moderate to severe global hypokinesis  · Left  ventricular ejection fraction appears to be 31 - 35%.  · Left ventricular diastolic function is consistent with (grade III w/high LAP) fixed restrictive pattern  · The right ventricular cavity is mildly dilated. Moderately reduced right ventricular systolic function noted.  · The left atrial cavity is moderately dilated.  · Mild mitral valve regurgitation is present  · There is a small (<1cm) pericardial effusion.  · There is a large left pleural effusion.      I have reviewed the medications:  Scheduled Meds:apixaban, 5 mg, Oral, Q12H  aspirin, 81 mg, Oral, Daily  atorvastatin, 80 mg, Oral, Nightly  carvedilol, 6.25 mg, Oral, BID With Meals  folic acid, 1,000 mcg, Oral, Daily  furosemide, 40 mg, Oral, Daily  hydrALAZINE, 50 mg, Oral, Q8H  insulin glargine, 8 Units, Subcutaneous, Nightly  insulin lispro, 0-7 Units, Subcutaneous, TID AC  lisinopril, 10 mg, Oral, Daily  melatonin, 3 mg, Oral, Nightly  sodium chloride, 10 mL, Intravenous, Q12H  tamsulosin, 0.4 mg, Oral, Nightly  vitamin B-12, 1,000 mcg, Oral, Daily      Continuous Infusions:   PRN Meds:.•  acetaminophen **OR** acetaminophen  •  dextrose  •  dextrose  •  glucagon (human recombinant)  •  ondansetron  •  [COMPLETED] Insert peripheral IV **AND** sodium chloride  •  sodium chloride    Assessment/Plan   Assessment & Plan     Active Hospital Problems    Diagnosis  POA   • **Embolic stroke (CMS/HCC) [I63.9]  Yes   • Patent foramen ovale [Q21.1]  Not Applicable   • Debility [R53.81]  Yes   • Essential hypertension [I10]  Yes   • Anemia, chronic renal failure, stage 3 (moderate) (CMS/HCC) [N18.30, D63.1]  Yes   • Chronically elevated troponin [R77.8]  Yes   • Coronary artery disease involving native coronary artery of native heart without angina pectoris [I25.10]  Yes   • Insomnia [G47.00]  Yes   • YAW (obstructive sleep apnea) [G47.33]  Yes   • Acute on chronic combined systolic and diastolic congestive heart failure (CMS/HCC) [I50.43]  Yes   • Acquired  hypothyroidism [E03.9]  Yes   • Stage 3b chronic kidney disease (CMS/HCC) [N18.32]  Yes   • 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        Brief Hospital Course to date:  Carlito Mo is a 65 y.o. male presents with word finding difficulty and found to have embolic strokes.    Embolic strokes:  MRI showing multiple new areas of infarction with chronic microhemorrhages. Echo 5/27/2020 with EF 60% and normal systolic function. TTE repeat ordered. Neurology consulted. Allowing permissive hypertension for now, but will normalize BP once cleared by neurology. ASA/statin continued. Previously had recurrent strokes with Asa/plavix and had GI bleeding while on warfarin.  Neurology now have patient on aspirin and Eliquis and recommend follow-up in neurology clinic in 3 months.    Acute on chronic diastolic HF/CAD/hypertension/chronically elevated troponin: Cardiology consulted. Not felt to be cardiac chest pain and troponin chronically up d/t renal issues. Received IV Lasix 40 mg x 2 doses for hypervolemia and pulmonary edema.  Now transition to oral Lasix..  Home medications include both carvedilol and metoprolol.  Most recent cardiology note from last year indicates metoprolol succinate 50 mg daily.  Patient now may benefit better from carvedilol for improved blood pressure control.  Continue carvedilol and monitor.    Chronic kidney disease:  Followed by Dr. Huddleston. Creatinine close to more recent baseline of 2.3-2.6.  Currently at baseline renal function. Avoid nephrotoxins    Type 2 diabetes mellitus:  Basal insulin decreased from home levels.  Correction scale.  Monitor glucose and adjust as needed.    Hyperlipidemia: Statin.  Stable.    Essential hypertension: Initially allowed permissive hypertension following stroke.  Gradually working to normalize.  Hydralazine and lisinopril  restarted now.    Anemia:  Initial ER hemoglobin 7.1.  Repeat was approximately 10.  We suspect lab error.  His chronic baseline is around 10.  Plan to monitor.  He is reportedly plan to do capsule endoscopy with outpatient gastroenterology at some point.  We recommend he continue with this plan.    DVT Prophylaxis: Anticoagulation    Disposition: Patient would like to go home with home health when stabilized.    CODE STATUS:   Code Status and Medical Interventions:   Ordered at: 05/21/21 0023     Code Status:    CPR     Medical Interventions (Level of Support Prior to Arrest):    Full       Delmar Carmichael MD  05/22/21

## 2021-05-22 NOTE — PLAN OF CARE
Goal Outcome Evaluation:  Plan of Care Reviewed With: patient  Progress: no change  Outcome Summary: VSS overnight on room air, NIH=1, no changes observed to neuro status since initial shift assess, no c/o pain, no s/s distress observed, will cont to monitor.

## 2021-05-22 NOTE — PROGRESS NOTES
Hospital Follow Up    LOS:  LOS: 2 days   Patient Name: Carlito Mo  Age/Sex: 65 y.o. male  : 1955  MRN: [id number]    Day of Service: 21   Length of Stay: 2  Encounter Provider: Alverto Templeton MD  Place of Service: [facility] CARDIOLOGY  Patient Care Team:  Florencia Caballero MD as PCP - General (Internal Medicine)  Amber Murguia MD as Consulting Physician (Cardiology)  Sera La BRYAN as Pharmacist  OSeth Conte MD as Surgeon (General Surgery)  Kim Hoyos MD PhD as Consulting Physician (Hematology and Oncology)  Jerome Diallo MD as Referring Physician (Family Medicine)  Jose Enrique Louie MD as Consulting Physician (Gastroenterology)  Nima Huddleston MD as Consulting Physician (Nephrology)    Subjective:     Chief Complaint: Recurrent strokes    Interval History: Patient says is back to baseline doing well with no new issues.    Objective:     Objective:  Temp:  [97.4 °F (36.3 °C)-97.9 °F (36.6 °C)] 97.9 °F (36.6 °C)  Heart Rate:  [58-61] 61  Resp:  [16-18] 16  BP: (151-184)/(85-96) 153/86     Intake/Output Summary (Last 24 hours) at 2021 0917  Last data filed at 2021 2300  Gross per 24 hour   Intake 600 ml   Output 955 ml   Net -355 ml     Body mass index is 25.22 kg/m².      21  0050 21  1439 21  0300   Weight: 82.8 kg (182 lb 9.6 oz) 82.6 kg (182 lb) 84.4 kg (186 lb)     Weight change: -0.272 kg (-9.6 oz)      Physical Exam:   General : Alert, cooperative, in no acute distress.  Neuro: Alert,cooperative and oriented.  Lungs: CTAB. Normal respiratory effort and rate.  CV: Regular rate and rhythm, normal S1 and S2, no murmurs, gallops or rubs.  ABD: Soft, nontender, nondistended. Positive bowel sounds.  Extr: No edema or cyanosis, moves all extremities.    Lab Review:   Results from last 7 days   Lab Units 21  0531 21  0411   SODIUM mmol/L 139 140   POTASSIUM mmol/L 4.3 4.1   CHLORIDE mmol/L  106 107   CO2 mmol/L 26.9 23.7   BUN mg/dL 34* 35*   CREATININE mg/dL 2.04* 2.37*   GLUCOSE mg/dL 70 134*   CALCIUM mg/dL 8.4* 8.6   AST (SGOT) U/L 11 10   ALT (SGPT) U/L 10 9     Results from last 7 days   Lab Units 05/21/21  0941 05/20/21  2150   TROPONIN T ng/mL 0.082* 0.076*     Results from last 7 days   Lab Units 05/22/21  0531 05/21/21  0941 05/21/21  0411   WBC 10*3/mm3 5.47  --  7.20   HEMOGLOBIN g/dL 9.7* 10.0* 10.1*   HEMATOCRIT % 30.1* 32.7* 32.1*   PLATELETS 10*3/mm3 222  --  229             Results from last 7 days   Lab Units 05/21/21  0411   CHOLESTEROL mg/dL 166   TRIGLYCERIDES mg/dL 61   HDL CHOL mg/dL 46     Results from last 7 days   Lab Units 05/20/21  2150   PROBNP pg/mL 10,859.0*     Results from last 7 days   Lab Units 05/21/21  0941   TSH uIU/mL 3.870       Current Medications:   Scheduled Meds:apixaban, 5 mg, Oral, Q12H  aspirin, 81 mg, Oral, Daily  atorvastatin, 80 mg, Oral, Nightly  carvedilol, 6.25 mg, Oral, BID With Meals  folic acid, 1,000 mcg, Oral, Daily  furosemide, 40 mg, Oral, Daily  hydrALAZINE, 50 mg, Oral, Q8H  insulin glargine, 5 Units, Subcutaneous, Nightly  insulin lispro, 0-7 Units, Subcutaneous, TID AC  lisinopril, 10 mg, Oral, Daily  melatonin, 3 mg, Oral, Nightly  sodium chloride, 10 mL, Intravenous, Q12H  tamsulosin, 0.4 mg, Oral, Nightly  vitamin B-12, 1,000 mcg, Oral, Daily    Interpretation Summary    · There is moderate to severe global hypokinesis  · Left ventricular ejection fraction appears to be 31 - 35%.  · Left ventricular diastolic function is consistent with (grade III w/high LAP) fixed restrictive pattern  · The right ventricular cavity is mildly dilated. Moderately reduced right ventricular systolic function noted.  · The left atrial cavity is moderately dilated.  · Mild mitral valve regurgitation is present  · There is a small (<1cm) pericardial effusion.  · There is a large left pleural effusion.       Continuous Infusions:     Allergies:  Allergies    Allergen Reactions   • Prozac [Fluoxetine Hcl] Other (See Comments)     shaking       Assessment:       Embolic stroke (CMS/HCC)    Other hyperlipidemia    Type 2 diabetes mellitus with ophthalmic complication (CMS/HCC)    Stage 3b chronic kidney disease (CMS/HCC)    Acquired hypothyroidism    Acute on chronic combined systolic and diastolic congestive heart failure (CMS/HCC)    YAW (obstructive sleep apnea)    Coronary artery disease involving native coronary artery of native heart without angina pectoris    Chronically elevated troponin    Insomnia    Anemia, chronic renal failure, stage 3 (moderate) (CMS/HCC)    Essential hypertension    Debility    Patent foramen ovale        Plan:   1.  Recurrent strokes.  Reviewed neurology's note.  Absolutely agree with the Eliquis.  2.  Cardiomyopathy echo on 5/27/2020 showed ejection fraction of 60%.  His EF is now half of that.  He is on a beta-blocker and an ACE inhibitor.  His cardiomyopathy in my opinion go along with the atrial fibrillation although we have never officially seen it it would be highly consistent with it.  He has not had any types of chest discomfort.  Again anticoagulation with Eliquis optimizing medications.  He also has a large pleural effusion we will start some diuretics and see if we can get him feeling better from aspect.  3.  Hypertension again going to initiate diuretics should help this also.        Alverto Templeton MD  05/22/21  09:17 EDT

## 2021-05-23 ENCOUNTER — APPOINTMENT (OUTPATIENT)
Dept: CARDIOLOGY | Facility: HOSPITAL | Age: 66
End: 2021-05-23

## 2021-05-23 LAB
ALBUMIN SERPL-MCNC: 3 G/DL (ref 3.5–5.2)
ALBUMIN/GLOB SERPL: 1.4 G/DL
ALP SERPL-CCNC: 73 U/L (ref 39–117)
ALT SERPL W P-5'-P-CCNC: 11 U/L (ref 1–41)
ANION GAP SERPL CALCULATED.3IONS-SCNC: 8 MMOL/L (ref 5–15)
AST SERPL-CCNC: 12 U/L (ref 1–40)
BH CV LOWER VASCULAR LEFT COMMON FEMORAL AUGMENT: NORMAL
BH CV LOWER VASCULAR LEFT COMMON FEMORAL COMPETENT: NORMAL
BH CV LOWER VASCULAR LEFT COMMON FEMORAL COMPRESS: NORMAL
BH CV LOWER VASCULAR LEFT COMMON FEMORAL PHASIC: NORMAL
BH CV LOWER VASCULAR LEFT COMMON FEMORAL SPONT: NORMAL
BH CV LOWER VASCULAR LEFT DISTAL FEMORAL COMPRESS: NORMAL
BH CV LOWER VASCULAR LEFT GASTRONEMIUS COMPRESS: NORMAL
BH CV LOWER VASCULAR LEFT GREATER SAPH BK COMPRESS: NORMAL
BH CV LOWER VASCULAR LEFT LESSER SAPH COMPRESS: NORMAL
BH CV LOWER VASCULAR LEFT MID FEMORAL AUGMENT: NORMAL
BH CV LOWER VASCULAR LEFT MID FEMORAL COMPETENT: NORMAL
BH CV LOWER VASCULAR LEFT MID FEMORAL COMPRESS: NORMAL
BH CV LOWER VASCULAR LEFT MID FEMORAL PHASIC: NORMAL
BH CV LOWER VASCULAR LEFT MID FEMORAL SPONT: NORMAL
BH CV LOWER VASCULAR LEFT PERONEAL COMPRESS: NORMAL
BH CV LOWER VASCULAR LEFT POPLITEAL AUGMENT: NORMAL
BH CV LOWER VASCULAR LEFT POPLITEAL COMPETENT: NORMAL
BH CV LOWER VASCULAR LEFT POPLITEAL COMPRESS: NORMAL
BH CV LOWER VASCULAR LEFT POPLITEAL PHASIC: NORMAL
BH CV LOWER VASCULAR LEFT POPLITEAL SPONT: NORMAL
BH CV LOWER VASCULAR LEFT POSTERIOR TIBIAL COMPRESS: NORMAL
BH CV LOWER VASCULAR LEFT PROFUNDA FEMORAL COMPRESS: NORMAL
BH CV LOWER VASCULAR LEFT PROXIMAL FEMORAL COMPRESS: NORMAL
BH CV LOWER VASCULAR LEFT SAPHENOFEMORAL JUNCTION COMPRESS: NORMAL
BH CV LOWER VASCULAR RIGHT COMMON FEMORAL AUGMENT: NORMAL
BH CV LOWER VASCULAR RIGHT COMMON FEMORAL COMPETENT: NORMAL
BH CV LOWER VASCULAR RIGHT COMMON FEMORAL COMPRESS: NORMAL
BH CV LOWER VASCULAR RIGHT COMMON FEMORAL PHASIC: NORMAL
BH CV LOWER VASCULAR RIGHT COMMON FEMORAL SPONT: NORMAL
BH CV LOWER VASCULAR RIGHT DISTAL FEMORAL COMPRESS: NORMAL
BH CV LOWER VASCULAR RIGHT GASTRONEMIUS COMPRESS: NORMAL
BH CV LOWER VASCULAR RIGHT GREATER SAPH AK COMPRESS: NORMAL
BH CV LOWER VASCULAR RIGHT GREATER SAPH BK COMPRESS: NORMAL
BH CV LOWER VASCULAR RIGHT LESSER SAPH COMPRESS: NORMAL
BH CV LOWER VASCULAR RIGHT MID FEMORAL AUGMENT: NORMAL
BH CV LOWER VASCULAR RIGHT MID FEMORAL COMPETENT: NORMAL
BH CV LOWER VASCULAR RIGHT MID FEMORAL COMPRESS: NORMAL
BH CV LOWER VASCULAR RIGHT MID FEMORAL PHASIC: NORMAL
BH CV LOWER VASCULAR RIGHT MID FEMORAL SPONT: NORMAL
BH CV LOWER VASCULAR RIGHT PERONEAL COMPRESS: NORMAL
BH CV LOWER VASCULAR RIGHT POPLITEAL AUGMENT: NORMAL
BH CV LOWER VASCULAR RIGHT POPLITEAL COMPETENT: NORMAL
BH CV LOWER VASCULAR RIGHT POPLITEAL COMPRESS: NORMAL
BH CV LOWER VASCULAR RIGHT POPLITEAL PHASIC: NORMAL
BH CV LOWER VASCULAR RIGHT POPLITEAL SPONT: NORMAL
BH CV LOWER VASCULAR RIGHT POSTERIOR TIBIAL COMPRESS: NORMAL
BH CV LOWER VASCULAR RIGHT PROFUNDA FEMORAL COMPRESS: NORMAL
BH CV LOWER VASCULAR RIGHT PROXIMAL FEMORAL COMPRESS: NORMAL
BH CV LOWER VASCULAR RIGHT SAPHENOFEMORAL JUNCTION COMPRESS: NORMAL
BILIRUB SERPL-MCNC: 0.3 MG/DL (ref 0–1.2)
BUN SERPL-MCNC: 35 MG/DL (ref 8–23)
BUN/CREAT SERPL: 18.4 (ref 7–25)
CALCIUM SPEC-SCNC: 8.4 MG/DL (ref 8.6–10.5)
CHLORIDE SERPL-SCNC: 104 MMOL/L (ref 98–107)
CO2 SERPL-SCNC: 27 MMOL/L (ref 22–29)
CREAT SERPL-MCNC: 1.9 MG/DL (ref 0.76–1.27)
DEPRECATED RDW RBC AUTO: 38.8 FL (ref 37–54)
ERYTHROCYTE [DISTWIDTH] IN BLOOD BY AUTOMATED COUNT: 13 % (ref 12.3–15.4)
GFR SERPL CREATININE-BSD FRML MDRD: 36 ML/MIN/1.73
GLOBULIN UR ELPH-MCNC: 2.1 GM/DL
GLUCOSE BLDC GLUCOMTR-MCNC: 181 MG/DL (ref 70–130)
GLUCOSE BLDC GLUCOMTR-MCNC: 193 MG/DL (ref 70–130)
GLUCOSE BLDC GLUCOMTR-MCNC: 201 MG/DL (ref 70–130)
GLUCOSE BLDC GLUCOMTR-MCNC: 97 MG/DL (ref 70–130)
GLUCOSE SERPL-MCNC: 85 MG/DL (ref 65–99)
HCT VFR BLD AUTO: 32 % (ref 37.5–51)
HGB BLD-MCNC: 10.1 G/DL (ref 13–17.7)
MCH RBC QN AUTO: 26.3 PG (ref 26.6–33)
MCHC RBC AUTO-ENTMCNC: 31.6 G/DL (ref 31.5–35.7)
MCV RBC AUTO: 83.3 FL (ref 79–97)
PLATELET # BLD AUTO: 241 10*3/MM3 (ref 140–450)
PMV BLD AUTO: 10.4 FL (ref 6–12)
POTASSIUM SERPL-SCNC: 4.2 MMOL/L (ref 3.5–5.2)
PROT SERPL-MCNC: 5.1 G/DL (ref 6–8.5)
RBC # BLD AUTO: 3.84 10*6/MM3 (ref 4.14–5.8)
SODIUM SERPL-SCNC: 139 MMOL/L (ref 136–145)
WBC # BLD AUTO: 5.74 10*3/MM3 (ref 3.4–10.8)

## 2021-05-23 PROCEDURE — 80053 COMPREHEN METABOLIC PANEL: CPT | Performed by: NURSE PRACTITIONER

## 2021-05-23 PROCEDURE — 93970 EXTREMITY STUDY: CPT

## 2021-05-23 PROCEDURE — 25010000002 FUROSEMIDE PER 20 MG: Performed by: INTERNAL MEDICINE

## 2021-05-23 PROCEDURE — 63710000001 INSULIN GLARGINE PER 5 UNITS: Performed by: INTERNAL MEDICINE

## 2021-05-23 PROCEDURE — 99233 SBSQ HOSP IP/OBS HIGH 50: CPT | Performed by: INTERNAL MEDICINE

## 2021-05-23 PROCEDURE — 63710000001 INSULIN LISPRO (HUMAN) PER 5 UNITS: Performed by: INTERNAL MEDICINE

## 2021-05-23 PROCEDURE — 85027 COMPLETE CBC AUTOMATED: CPT | Performed by: NURSE PRACTITIONER

## 2021-05-23 PROCEDURE — 82962 GLUCOSE BLOOD TEST: CPT

## 2021-05-23 RX ORDER — INSULIN GLARGINE 100 [IU]/ML
3 INJECTION, SOLUTION SUBCUTANEOUS NIGHTLY
Status: DISCONTINUED | OUTPATIENT
Start: 2021-05-23 | End: 2021-05-24 | Stop reason: HOSPADM

## 2021-05-23 RX ORDER — FUROSEMIDE 10 MG/ML
40 INJECTION INTRAMUSCULAR; INTRAVENOUS ONCE
Status: COMPLETED | OUTPATIENT
Start: 2021-05-23 | End: 2021-05-23

## 2021-05-23 RX ORDER — INSULIN LISPRO 100 [IU]/ML
2 INJECTION, SOLUTION INTRAVENOUS; SUBCUTANEOUS
Status: DISCONTINUED | OUTPATIENT
Start: 2021-05-23 | End: 2021-05-23

## 2021-05-23 RX ORDER — CARVEDILOL 25 MG/1
25 TABLET ORAL 2 TIMES DAILY WITH MEALS
Status: DISCONTINUED | OUTPATIENT
Start: 2021-05-23 | End: 2021-05-24 | Stop reason: HOSPADM

## 2021-05-23 RX ADMIN — INSULIN LISPRO 2 UNITS: 100 INJECTION, SOLUTION INTRAVENOUS; SUBCUTANEOUS at 17:25

## 2021-05-23 RX ADMIN — Medication 1000 MCG: at 09:26

## 2021-05-23 RX ADMIN — LISINOPRIL 20 MG: 20 TABLET ORAL at 09:26

## 2021-05-23 RX ADMIN — FOLIC ACID 1000 MCG: 1 TABLET ORAL at 09:26

## 2021-05-23 RX ADMIN — INSULIN GLARGINE 3 UNITS: 100 INJECTION, SOLUTION SUBCUTANEOUS at 22:21

## 2021-05-23 RX ADMIN — APIXABAN 5 MG: 5 TABLET, FILM COATED ORAL at 09:26

## 2021-05-23 RX ADMIN — FUROSEMIDE 40 MG: 10 INJECTION, SOLUTION INTRAVENOUS at 14:51

## 2021-05-23 RX ADMIN — ATORVASTATIN CALCIUM 80 MG: 80 TABLET, FILM COATED ORAL at 20:28

## 2021-05-23 RX ADMIN — SODIUM CHLORIDE, PRESERVATIVE FREE 10 ML: 5 INJECTION INTRAVENOUS at 09:26

## 2021-05-23 RX ADMIN — CARVEDILOL 12.5 MG: 12.5 TABLET, FILM COATED ORAL at 09:26

## 2021-05-23 RX ADMIN — ASPIRIN 81 MG: 81 TABLET, COATED ORAL at 09:26

## 2021-05-23 RX ADMIN — CARVEDILOL 25 MG: 25 TABLET, FILM COATED ORAL at 17:26

## 2021-05-23 RX ADMIN — SODIUM CHLORIDE, PRESERVATIVE FREE 10 ML: 5 INJECTION INTRAVENOUS at 20:28

## 2021-05-23 RX ADMIN — FUROSEMIDE 40 MG: 40 TABLET ORAL at 09:26

## 2021-05-23 RX ADMIN — APIXABAN 5 MG: 5 TABLET, FILM COATED ORAL at 20:28

## 2021-05-23 RX ADMIN — TAMSULOSIN HYDROCHLORIDE 0.4 MG: 0.4 CAPSULE ORAL at 20:28

## 2021-05-23 RX ADMIN — SPIRONOLACTONE 25 MG: 25 TABLET ORAL at 09:26

## 2021-05-23 RX ADMIN — Medication 3 MG: at 20:28

## 2021-05-23 NOTE — PROGRESS NOTES
[facility] Medicine Services  PROGRESS NOTE    Patient Name: Carlito Mo  : 1955  MRN: [id number]    Date of Admission: 2021  Primary Care Physician: Florencia Caballero MD    Subjective   Subjective     CC:  Follow-up stroke.    HPI:  Continues to be poor historian.  No new complaints.     Review of Systems  No current fevers or chills  No current shortness of breath or cough  No current nausea, vomiting, or diarrhea  No current chest pain or palpitations    Objective   Objective     Vital Signs:   Temp:  [97.2 °F (36.2 °C)-98 °F (36.7 °C)] 97.3 °F (36.3 °C)  Heart Rate:  [57-73] 61  Resp:  [16-18] 16  BP: (125-176)/(82-96) 158/85  Total (NIH Stroke Scale): 0     Physical Exam:  Constitutional:Awake, alert  HENT: NCAT, mucous membranes moist, neck supple  Respiratory: Clear to auscultation bilaterally, respiratory effort normal, nonlabored breathing   Cardiovascular: RRR, normal radial pulses  Gastrointestinal: Positive bowel sounds, soft, nontender, nondistended  Musculoskeletal: Normal musculature for age, no lower extremity edema, BMI 25  Psychiatric: Flat affect, cooperative, conversational  Neurologic: Word finding difficulties improved, oriented, follows commands  Skin: No rashes or jaundice, warm      Results Reviewed:  Results from last 7 days   Lab Units 21  0721  0521  0941 21  0411   WBC 10*3/mm3 5.74 5.47  --  7.20   HEMOGLOBIN g/dL 10.1* 9.7* 10.0* 10.1*   HEMATOCRIT % 32.0* 30.1* 32.7* 32.1*   PLATELETS 10*3/mm3 241 222  --  229     Results from last 7 days   Lab Units 21  0705 21  0531 21  0941 21  0411 21  2150   SODIUM mmol/L 139 139  --  140 141   POTASSIUM mmol/L 4.2 4.3  --  4.1 4.1   CHLORIDE mmol/L 104 106  --  107 111*   CO2 mmol/L 27.0 26.9  --  23.7 24.0   BUN mg/dL 35* 34*  --  35* 33*   CREATININE mg/dL 1.90* 2.04*  --  2.37* 2.14*   GLUCOSE mg/dL 85 70  --  134* 183*   CALCIUM mg/dL 8.4* 8.4*  --  8.6 8.0*    ALT (SGPT) U/L 11 10  --  9 9   AST (SGOT) U/L 12 11  --  10 9   TROPONIN T ng/mL  --   --  0.082*  --  0.076*   PROBNP pg/mL  --   --   --   --  10,859.0*     Estimated Creatinine Clearance: 46.3 mL/min (A) (by C-G formula based on SCr of 1.9 mg/dL (H)).    Microbiology Results Abnormal     Procedure Component Value - Date/Time    COVID PRE-OP / PRE-PROCEDURE SCREENING ORDER (NO ISOLATION) - Swab, Nasopharynx [[id number]]  (Normal) Collected: 05/20/21 2356    Lab Status: Final result Specimen: Swab from Nasopharynx Updated: 05/21/21 1310    Narrative:      The following orders were created for panel order COVID PRE-OP / PRE-PROCEDURE SCREENING ORDER (NO ISOLATION) - Swab, Nasopharynx.  Procedure                               Abnormality         Status                     ---------                               -----------         ------                     COVID-19,APTIMA PANTHER,...[[id number]]  Normal              Final result                 Please view results for these tests on the individual orders.    COVID-19,APTIMA PANTHER,SUKUMAR IN-HOUSE, NP/OP SWAB IN UTM/VTM/SALINE TRANSPORT MEDIA,24 HR TAT - Swab, Nasopharynx [[id number]]  (Normal) Collected: 05/20/21 2356    Lab Status: Final result Specimen: Swab from Nasopharynx Updated: 05/21/21 1310     COVID19 Not Detected    Narrative:      Fact sheet for providers: https://www.fda.gov/media/137718/download     Fact sheet for patients: https://www.fda.gov/media/176349/download    Test performed by RT PCR.          Imaging Results (Last 24 Hours)     ** No results found for the last 24 hours. **          Results for orders placed during the hospital encounter of 05/20/21    Adult Transthoracic Echo Complete W/ Cont if Necessary Per Protocol (With Agitated Saline)    Interpretation Summary  · There is moderate to severe global hypokinesis  · Left ventricular ejection fraction appears to be 31 - 35%.  · Left ventricular diastolic function is consistent with (grade III  w/high LAP) fixed restrictive pattern  · The right ventricular cavity is mildly dilated. Moderately reduced right ventricular systolic function noted.  · The left atrial cavity is moderately dilated.  · Mild mitral valve regurgitation is present  · There is a small (<1cm) pericardial effusion.  · There is a large left pleural effusion.      I have reviewed the medications:  Scheduled Meds:apixaban, 5 mg, Oral, Q12H  aspirin, 81 mg, Oral, Daily  atorvastatin, 80 mg, Oral, Nightly  carvedilol, 25 mg, Oral, BID With Meals  folic acid, 1,000 mcg, Oral, Daily  furosemide, 40 mg, Intravenous, Once  furosemide, 40 mg, Oral, Daily  insulin glargine, 5 Units, Subcutaneous, Nightly  insulin lispro, 0-7 Units, Subcutaneous, TID AC  [START ON 5/24/2021] lisinopril, 30 mg, Oral, Daily  melatonin, 3 mg, Oral, Nightly  sodium chloride, 10 mL, Intravenous, Q12H  spironolactone, 25 mg, Oral, Daily  tamsulosin, 0.4 mg, Oral, Nightly  vitamin B-12, 1,000 mcg, Oral, Daily      Continuous Infusions:   PRN Meds:.•  acetaminophen **OR** acetaminophen  •  dextrose  •  dextrose  •  glucagon (human recombinant)  •  hydrALAZINE  •  ondansetron  •  [COMPLETED] Insert peripheral IV **AND** sodium chloride  •  sodium chloride    Assessment/Plan   Assessment & Plan     Active Hospital Problems    Diagnosis  POA   • **Embolic stroke (CMS/HCC) [I63.9]  Yes   • Patent foramen ovale [Q21.1]  Not Applicable   • Pleural effusion [J90]  Yes   • Debility [R53.81]  Yes   • Essential hypertension [I10]  Yes   • Anemia, chronic renal failure, stage 3 (moderate) (CMS/HCC) [N18.30, D63.1]  Yes   • Chronically elevated troponin [R77.8]  Yes   • Coronary artery disease involving native coronary artery of native heart without angina pectoris [I25.10]  Yes   • Insomnia [G47.00]  Yes   • YAW (obstructive sleep apnea) [G47.33]  Yes   • Acute on chronic combined systolic and diastolic congestive heart failure (CMS/HCC) [I50.43]  Yes   • Acquired hypothyroidism  [E03.9]  Yes   • Stage 3b chronic kidney disease (CMS/HCC) [N18.32]  Yes   • 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        Brief Hospital Course to date:  Carlito Mo is a 65 y.o. male presents with word finding difficulty and found to have embolic strokes.    Discussion: Stable today. Insulin further adjusted.  Laboratory stable to and renal function below baseline.  Plan is for diuresis for pleural effusion that is felt to be heart failure related.  Appreciate cardiology recommendations.  Possibly home in the next couple days as long as volume status is improved and if patient has good support at home.    Embolic strokes:  MRI showing multiple new areas of infarction with chronic microhemorrhages. Echo 5/27/2020 with EF 60% and normal systolic function. TTE repeat ordered. Neurology consulted. Allowing permissive hypertension for now, but will normalize BP once cleared by neurology. ASA/statin continued. Previously had recurrent strokes with Asa/plavix and had GI bleeding while on warfarin.  Neurology now have patient on aspirin and Eliquis and recommend follow-up in neurology clinic in 3 months.    Acute on chronic diastolic HF/CAD/hypertension/chronically elevated troponin: Cardiology consulted. Not felt to be cardiac chest pain and troponin chronically up d/t renal issues. Received IV Lasix for acute heart failure doses for hypervolemia and pulmonary edema.  Now transitioned to oral Lasix..  Home medications include both carvedilol and metoprolol.  Most recent cardiology note from last year indicates metoprolol succinate 50 mg daily.  Patient now may benefit better from carvedilol for improved blood pressure control.  Continue carvedilol and monitor.    Chronic kidney disease:  Followed by Dr. Huddleston. Creatinine close to more recent baseline of 2.3-2.6.  Currently at  baseline renal function. Avoid nephrotoxins    Type 2 diabetes mellitus:  Basal insulin further decreased from home levels.  Correction scale.  Monitor glucose and adjust as needed.    Hyperlipidemia: Statin.  Stable.    Essential hypertension: Initially allowed permissive hypertension following stroke.  Gradually working to normalize.  Hydralazine and lisinopril restarted now.    Anemia:  Initial ER hemoglobin 7.1.  Repeat was approximately 10.  We suspect lab error.  His chronic baseline is around 10.  Plan to monitor.  He is reportedly plan to do capsule endoscopy with outpatient gastroenterology at some point.  We recommend he continue with this plan.    DVT Prophylaxis: Anticoagulation    Disposition: Patient would like to go home with home health when stabilized.    CODE STATUS:   Code Status and Medical Interventions:   Ordered at: 05/21/21 0023     Code Status:    CPR     Medical Interventions (Level of Support Prior to Arrest):    Full       Delmar Carmichael MD  05/23/21

## 2021-05-23 NOTE — PLAN OF CARE
Goal Outcome Evaluation:  Plan of Care Reviewed With: patient  Progress: improving  Outcome Summary: NIH 0 this shift, very sleepy he states he hasn't slept well. Vascular studies complete and pending. BP meds adjusted today per md. updated wife. She is concerned that he has stairs to enter the house and to get to bedrm, bathroom but they are on the same level. PT consulted to yaniv tomorrow. no c/o

## 2021-05-23 NOTE — PROGRESS NOTES
Hospital Follow Up    LOS:  LOS: 3 days   Patient Name: Carlito Mo  Age/Sex: 65 y.o. male  : 1955  MRN: [id number]    Day of Service: 21   Length of Stay: 3  Encounter Provider: Alverto Templeton MD  Place of Service: [facility] CARDIOLOGY  Patient Care Team:  Florencia Caballero MD as PCP - General (Internal Medicine)  Amber Murguia MD as Consulting Physician (Cardiology)  Sera La RPH as Pharmacist  OSeth Conte MD as Surgeon (General Surgery)  Kim Hoyos MD PhD as Consulting Physician (Hematology and Oncology)  Jerome Diallo MD as Referring Physician (Family Medicine)  Jose Enrique Louie MD as Consulting Physician (Gastroenterology)  Nima Huddleston MD as Consulting Physician (Nephrology)    Subjective:     Chief Complaint: Recurrent strokes    Interval History: Patient says he is no different today.    Objective:     Objective:  Temp:  [97.2 °F (36.2 °C)-98 °F (36.7 °C)] 97.3 °F (36.3 °C)  Heart Rate:  [57-73] 61  Resp:  [16-18] 16  BP: (125-176)/(82-96) 158/85     Intake/Output Summary (Last 24 hours) at 2021 0953  Last data filed at 2021 0500  Gross per 24 hour   Intake 180 ml   Output 2760 ml   Net -2580 ml     Body mass index is 25.22 kg/m².      21  0050 21  1439 21  0300   Weight: 82.8 kg (182 lb 9.6 oz) 82.6 kg (182 lb) 84.4 kg (186 lb)     Weight change:       Physical Exam:   General : Alert, cooperative, in no acute distress.  Neuro: Alert,cooperative and oriented.  Lungs: CTAB. Normal respiratory effort and rate.  CV: Regular rate and rhythm, normal S1 and S2, no murmurs, gallops or rubs.  ABD: Soft, nontender, nondistended. Positive bowel sounds.  Extr: No edema or cyanosis, moves all extremities.    Lab Review:   Results from last 7 days   Lab Units 21  0705 21  0531   SODIUM mmol/L 139 139   POTASSIUM mmol/L 4.2 4.3   CHLORIDE mmol/L 104 106   CO2 mmol/L 27.0 26.9   BUN mg/dL  35* 34*   CREATININE mg/dL 1.90* 2.04*   GLUCOSE mg/dL 85 70   CALCIUM mg/dL 8.4* 8.4*   AST (SGOT) U/L 12 11   ALT (SGPT) U/L 11 10     Results from last 7 days   Lab Units 05/21/21  0941 05/20/21  2150   TROPONIN T ng/mL 0.082* 0.076*     Results from last 7 days   Lab Units 05/23/21  0706 05/22/21  0531   WBC 10*3/mm3 5.74 5.47   HEMOGLOBIN g/dL 10.1* 9.7*   HEMATOCRIT % 32.0* 30.1*   PLATELETS 10*3/mm3 241 222             Results from last 7 days   Lab Units 05/21/21  0411   CHOLESTEROL mg/dL 166   TRIGLYCERIDES mg/dL 61   HDL CHOL mg/dL 46     Results from last 7 days   Lab Units 05/20/21  2150   PROBNP pg/mL 10,859.0*     Results from last 7 days   Lab Units 05/21/21  0941   TSH uIU/mL 3.870       Current Medications:   Scheduled Meds:apixaban, 5 mg, Oral, Q12H  aspirin, 81 mg, Oral, Daily  atorvastatin, 80 mg, Oral, Nightly  carvedilol, 12.5 mg, Oral, BID With Meals  folic acid, 1,000 mcg, Oral, Daily  furosemide, 40 mg, Oral, Daily  insulin glargine, 5 Units, Subcutaneous, Nightly  insulin lispro, 0-7 Units, Subcutaneous, TID AC  lisinopril, 20 mg, Oral, Daily  melatonin, 3 mg, Oral, Nightly  sodium chloride, 10 mL, Intravenous, Q12H  spironolactone, 25 mg, Oral, Daily  tamsulosin, 0.4 mg, Oral, Nightly  vitamin B-12, 1,000 mcg, Oral, Daily      Continuous Infusions:     Allergies:  Allergies   Allergen Reactions   • Prozac [Fluoxetine Hcl] Other (See Comments)     shaking       Assessment:       Embolic stroke (CMS/[facility])    Other hyperlipidemia    Type 2 diabetes mellitus with ophthalmic complication (CMS/[facility])    Stage 3b chronic kidney disease (CMS/[facility])    Acquired hypothyroidism    Acute on chronic combined systolic and diastolic congestive heart failure (CMS/[facility])    YAW (obstructive sleep apnea)    Coronary artery disease involving native coronary artery of native heart without angina pectoris    Chronically elevated troponin    Insomnia    Anemia, chronic renal failure, stage 3 (moderate) (CMS/[facility])     Essential hypertension    Debility    Patent foramen ovale    Pleural effusion        Plan:      1. Recurrent strokes. Patient is on Eliquis tolerating it well.  2. New onset cardiomyopathy. Patient ejection fraction on 5/27 2020 was 60% now is 30 to 35%. proBNP is elevated I added low-dose Aldactone yesterday. We will give a another dose of IV Lasix today see if he responds better to that. Blood pressure will tolerate titrating both his ACE inhibitor and his beta-blocker. We will adjust all his medications.  3. Hypertension see problem #2  4. Patent foramen ovale  5. Chronic renal insufficiency kidney functions holding nicely. Increasing ACE inhibitor as well as getting more diuretics today need to follow closely.  6. We will see if he responds to medicine adjustment.    Alverto Templeton MD  05/23/21  09:53 EDT

## 2021-05-24 ENCOUNTER — TRANSCRIBE ORDERS (OUTPATIENT)
Dept: HOME HEALTH SERVICES | Facility: HOME HEALTHCARE | Age: 66
End: 2021-05-24

## 2021-05-24 VITALS
WEIGHT: 186 LBS | OXYGEN SATURATION: 95 % | DIASTOLIC BLOOD PRESSURE: 79 MMHG | HEART RATE: 59 BPM | HEIGHT: 72 IN | SYSTOLIC BLOOD PRESSURE: 137 MMHG | RESPIRATION RATE: 16 BRPM | BODY MASS INDEX: 25.19 KG/M2 | TEMPERATURE: 98 F

## 2021-05-24 PROBLEM — I42.9 CARDIOMYOPATHY: Status: ACTIVE | Noted: 2021-05-24

## 2021-05-24 LAB
ALBUMIN SERPL-MCNC: 3.1 G/DL (ref 3.5–5.2)
ALBUMIN/GLOB SERPL: 1.3 G/DL
ALP SERPL-CCNC: 84 U/L (ref 39–117)
ALT SERPL W P-5'-P-CCNC: 11 U/L (ref 1–41)
ANION GAP SERPL CALCULATED.3IONS-SCNC: 7.9 MMOL/L (ref 5–15)
AST SERPL-CCNC: 10 U/L (ref 1–40)
BILIRUB SERPL-MCNC: 0.3 MG/DL (ref 0–1.2)
BUN SERPL-MCNC: 40 MG/DL (ref 8–23)
BUN/CREAT SERPL: 20 (ref 7–25)
CALCIUM SPEC-SCNC: 8.6 MG/DL (ref 8.6–10.5)
CHLORIDE SERPL-SCNC: 104 MMOL/L (ref 98–107)
CO2 SERPL-SCNC: 26.1 MMOL/L (ref 22–29)
CREAT SERPL-MCNC: 2 MG/DL (ref 0.76–1.27)
DEPRECATED RDW RBC AUTO: 39.2 FL (ref 37–54)
ERYTHROCYTE [DISTWIDTH] IN BLOOD BY AUTOMATED COUNT: 13.2 % (ref 12.3–15.4)
GFR SERPL CREATININE-BSD FRML MDRD: 34 ML/MIN/1.73
GLOBULIN UR ELPH-MCNC: 2.4 GM/DL
GLUCOSE BLDC GLUCOMTR-MCNC: 124 MG/DL (ref 70–130)
GLUCOSE SERPL-MCNC: 107 MG/DL (ref 65–99)
HCT VFR BLD AUTO: 32.5 % (ref 37.5–51)
HGB BLD-MCNC: 10.6 G/DL (ref 13–17.7)
MCH RBC QN AUTO: 26.8 PG (ref 26.6–33)
MCHC RBC AUTO-ENTMCNC: 32.6 G/DL (ref 31.5–35.7)
MCV RBC AUTO: 82.1 FL (ref 79–97)
PLATELET # BLD AUTO: 238 10*3/MM3 (ref 140–450)
PMV BLD AUTO: 10.7 FL (ref 6–12)
POTASSIUM SERPL-SCNC: 4.6 MMOL/L (ref 3.5–5.2)
PROT SERPL-MCNC: 5.5 G/DL (ref 6–8.5)
RBC # BLD AUTO: 3.96 10*6/MM3 (ref 4.14–5.8)
SODIUM SERPL-SCNC: 138 MMOL/L (ref 136–145)
WBC # BLD AUTO: 6.16 10*3/MM3 (ref 3.4–10.8)

## 2021-05-24 PROCEDURE — 99232 SBSQ HOSP IP/OBS MODERATE 35: CPT | Performed by: INTERNAL MEDICINE

## 2021-05-24 PROCEDURE — 85027 COMPLETE CBC AUTOMATED: CPT | Performed by: NURSE PRACTITIONER

## 2021-05-24 PROCEDURE — 97110 THERAPEUTIC EXERCISES: CPT

## 2021-05-24 PROCEDURE — 97162 PT EVAL MOD COMPLEX 30 MIN: CPT

## 2021-05-24 PROCEDURE — 80053 COMPREHEN METABOLIC PANEL: CPT | Performed by: NURSE PRACTITIONER

## 2021-05-24 PROCEDURE — 82962 GLUCOSE BLOOD TEST: CPT

## 2021-05-24 PROCEDURE — 36415 COLL VENOUS BLD VENIPUNCTURE: CPT | Performed by: NURSE PRACTITIONER

## 2021-05-24 RX ORDER — ACETAMINOPHEN 325 MG/1
650 TABLET ORAL EVERY 4 HOURS PRN
Status: ON HOLD
Start: 2021-05-24 | End: 2022-02-01

## 2021-05-24 RX ORDER — SPIRONOLACTONE 25 MG/1
25 TABLET ORAL DAILY
Qty: 30 TABLET | Refills: 0 | Status: SHIPPED | OUTPATIENT
Start: 2021-05-25 | End: 2021-12-14

## 2021-05-24 RX ORDER — LISINOPRIL 10 MG/1
20 TABLET ORAL DAILY
Start: 2021-05-24 | End: 2021-12-22 | Stop reason: HOSPADM

## 2021-05-24 RX ORDER — CARVEDILOL 25 MG/1
25 TABLET ORAL 2 TIMES DAILY WITH MEALS
Qty: 60 TABLET | Refills: 0 | Status: ON HOLD | OUTPATIENT
Start: 2021-05-24 | End: 2021-12-22 | Stop reason: SDUPTHER

## 2021-05-24 RX ORDER — LANOLIN ALCOHOL/MO/W.PET/CERES
3 CREAM (GRAM) TOPICAL NIGHTLY
Qty: 30 TABLET | Refills: 0 | Status: ON HOLD | OUTPATIENT
Start: 2021-05-24 | End: 2022-02-01

## 2021-05-24 RX ORDER — INSULIN GLARGINE 100 [IU]/ML
4 INJECTION, SOLUTION SUBCUTANEOUS NIGHTLY
Start: 2021-05-24 | End: 2022-02-08 | Stop reason: SDUPTHER

## 2021-05-24 RX ORDER — ATORVASTATIN CALCIUM 80 MG/1
80 TABLET, FILM COATED ORAL NIGHTLY
Qty: 30 TABLET | Refills: 0 | Status: ON HOLD | OUTPATIENT
Start: 2021-05-24 | End: 2022-02-01

## 2021-05-24 RX ORDER — FUROSEMIDE 40 MG/1
40 TABLET ORAL DAILY
Start: 2021-05-24 | End: 2021-12-22 | Stop reason: HOSPADM

## 2021-05-24 RX ADMIN — CARVEDILOL 25 MG: 25 TABLET, FILM COATED ORAL at 10:25

## 2021-05-24 RX ADMIN — LISINOPRIL 30 MG: 20 TABLET ORAL at 10:25

## 2021-05-24 RX ADMIN — ASPIRIN 81 MG: 81 TABLET, COATED ORAL at 13:03

## 2021-05-24 RX ADMIN — SPIRONOLACTONE 25 MG: 25 TABLET ORAL at 13:03

## 2021-05-24 RX ADMIN — FOLIC ACID 1000 MCG: 1 TABLET ORAL at 10:25

## 2021-05-24 RX ADMIN — FUROSEMIDE 40 MG: 40 TABLET ORAL at 10:25

## 2021-05-24 RX ADMIN — CARVEDILOL 25 MG: 25 TABLET, FILM COATED ORAL at 16:33

## 2021-05-24 RX ADMIN — SODIUM CHLORIDE, PRESERVATIVE FREE 10 ML: 5 INJECTION INTRAVENOUS at 10:26

## 2021-05-24 RX ADMIN — APIXABAN 5 MG: 5 TABLET, FILM COATED ORAL at 10:26

## 2021-05-24 RX ADMIN — Medication 1000 MCG: at 10:26

## 2021-05-24 NOTE — THERAPY EVALUATION
Patient Name: Carlito Mo  : 1955    MRN: [id number]                              Today's Date: 2021       Admit Date: 2021    Visit Dx:     ICD-10-CM ICD-9-CM   1. Expressive aphasia  R47.01 784.3   2. Anemia, unspecified type  D64.9 285.9   3. Acute congestive heart failure, unspecified heart failure type (CMS/HCC)  I50.9 428.0     Patient Active Problem List   Diagnosis   • Major depressive disorder with single episode, in partial remission (CMS/HCC)   • Other hyperlipidemia   • Vitamin D deficiency   • Erectile dysfunction   • Chronic fatigue   • Type 2 diabetes mellitus with ophthalmic complication (CMS/HCC)   • Skin lesion of chest wall   • Slow transit constipation   • Benign prostatic hyperplasia with nocturia   • Stage 3b chronic kidney disease (CMS/HCC)   • History of basal cell carcinoma   • High blood pressure associated with diabetes (CMS/HCC)   • Acquired hypothyroidism   • Recurrent strokes (CMS/[facility])   • Urinary retention   • Pneumonia   • Acute on chronic combined systolic and diastolic congestive heart failure (CMS/HCC)   • Acute respiratory failure with hypoxia (CMS/HCC)   • Suspected sleep apnea   • Pleural effusion   • YAW (obstructive sleep apnea)   • Coronary artery disease involving native coronary artery of native heart without angina pectoris   • Chronically elevated troponin   • Colon cancer screening   • Enlarged lymph nodes   • Functional gait abnormality   • History of myocardial infarction   • Hospital discharge follow-up   • Insomnia   • Iron deficiency anemia   • Major depression, recurrent (CMS/HCC)   • Anemia, chronic renal failure, stage 3 (moderate) (CMS/HCC)   • Essential hypertension   • Adverse effect of iron and its compounds, initial encounter   • Acute on chronic renal insufficiency   • Debility   • Falls frequently   • Acute pain of right shoulder   • Acute on chronic anemia   • Heme positive stool   • Neurogenic orthostatic hypotension (CMS/HCC)   •  Symptomatic anemia   • History of colon polyps   • Helicobacter pylori gastritis   • Esophagitis   • Sleep related hypoxia   • Embolic stroke (CMS/HCC)   • Patent foramen ovale   • Pleural effusion   • Cardiomyopathy (CMS/HCC)     Past Medical History:   Diagnosis Date   • Acquired hypothyroidism    • Acute congestive heart failure (CMS/HCC)    • Acute respiratory failure with hypoxia (CMS/HCC)    • Acute sinusitis    • SYLVAIN (acute kidney injury) (CMS/HCC)     on CKD   • Anemia    • Arthritis    • CAD (coronary artery disease)    • Cancer (CMS/HCC)     skin   • Chronic combined systolic and diastolic congestive heart failure (CMS/HCC)    • Chronic ischemic heart disease    • CKD (chronic kidney disease)    • COPD (chronic obstructive pulmonary disease) (CMS/HCC)    • Depression    • Diabetes mellitus type 2, uncontrolled, without complications    • Disease of thyroid gland    • Fatigue    • Frequent PVCs    • Heart attack (CMS/HCC)    • History of coronary artery disease     with remote history of bypass surgery in 2001   • History of transfusion    • Hyperglycemia    • Hyperlipidemia    • Hypertension    • Hypertensive encephalopathy    • Mental status change     Acute   • YAW on CPAP    • Pleural effusion    • Pneumonia    • RBBB (right bundle branch block)    • Screening for prostate cancer 2011   • Stroke (CMS/HCC)    • TIA (transient ischemic attack)    • Type 2 diabetes mellitus (CMS/HCC)    • Urinary retention    • Vitamin D deficiency      Past Surgical History:   Procedure Laterality Date   • APPENDECTOMY N/A 02/14/2016    Dr. Alexey Dodson   • COLONOSCOPY      over 20 years ago.  no polyps    • COLONOSCOPY N/A 9/18/2018    Procedure: COLONOSCOPY;  Surgeon: Jose Enrique Louie MD;  Location: [facility] ENDOSCOPY;  Service: Gastroenterology   • COLONOSCOPY N/A 9/19/2018    IH, EH, polyps (TAs w/low grade dysplasia)   • COLONOSCOPY N/A 6/1/2020    Procedure: COLONOSCOPY;  Surgeon: Jose Enrique Louie MD;   Location:  [facility] ENDOSCOPY;  Service: Gastroenterology;  Laterality: N/A;  Pre op-Anemia, Melena, History of Polyps  Post op-To Cecum/TI, Polyp, Poor Prep   • CORONARY ARTERY BYPASS GRAFT  11/2001   • ENDOSCOPY N/A 5/29/2020    Procedure: ESOPHAGOGASTRODUODENOSCOPY with biopsies;  Surgeon: Amanda Lovelace MD;  Location:  [facility] ENDOSCOPY;  Service: Gastroenterology;  Laterality: N/A;  pre-anemia, dark stools  post-esophagitis, hiatal hernia   • ENDOSCOPY N/A 9/15/2020    Procedure: ESOPHAGOGASTRODUODENOSCOPY WITH BIOPSIES;  Surgeon: Jose Enrique Louie MD;  [facility];  Service: Gastroenterology;  Laterality: N/A;  pre: history of melena and esophagitis  post: mild esophagitis and gastritis, small hiatal hernia   • HERNIA REPAIR Left 12/05/2019   • TONSILLECTOMY     • VASECTOMY       General Information     Row Name 05/24/21 1348          Physical Therapy Time and Intention    Document Type  evaluation;therapy note (daily note)  -PC     Mode of Treatment  physical therapy  -PC     Row Name 05/24/21 1348          General Information    Patient Profile Reviewed  yes  -PC     Prior Level of Function  independent:;all household mobility  -PC     Existing Precautions/Restrictions  fall  -PC     Row Name 05/24/21 1348          Home Main Entrance    Number of Stairs, Main Entrance  none  -PC     Row Name 05/24/21 1348          Stairs Within Home, Primary    Stairs, Within Home, Primary  (S) pt lives in a tri level with 4 stairs to floor with family room/kitchen, 8-10 stairs to bedroom  -PC     Row Name 05/24/21 1348          Cognition    Orientation Status (Cognition)  oriented x 4  -PC     Row Name 05/24/21 1345          Safety Issues, Functional Mobility    Impairments Affecting Function (Mobility)  balance;endurance/activity tolerance  -PC       User Key  (r) = Recorded By, (t) = Taken By, (c) = Cosigned By    Initials Name Provider Type    PC Denise Espinal, PT Physical Therapist        Mobility      [facility] Name 05/24/21 1349          Bed Mobility    Supine-Sit [place] (Bed Mobility)  independent  -PC     Row Name 05/24/21 1349          Sit-Stand Transfer    Sit-Stand [place] (Transfers)  supervision  -PC     Row Name 05/24/21 1349          Gait/Stairs (Locomotion)    [place] Level (Gait)  standby assist  -PC     Distance in Feet (Gait)  150 ft, pt reaching for walls, furniture, uses cane at home, several standing rests due to fatigue, SOA  -PC     [place] Level (Stairs)  supervision  -PC     Handrail Location (Stairs)  both sides  -     Number of Steps (Stairs)  10  -PC     Ascending Technique (Stairs)  step-over-step  -PC     Descending Technique (Stairs)  step-to-step  -PC     Comment (Gait/Stairs)  fatigue with needing frequent rests  -       User Key  (r) = Recorded By, (t) = Taken By, (c) = Cosigned By    Initials Name Provider Type    PC Denise Espinal, PT Physical Therapist        Obj/Interventions     Row Name 05/24/21 1352          Range of Motion Comprehensive    General Range of Motion  no range of motion deficits identified  -PC     Row Name 05/24/21 1352          Strength Comprehensive (MMT)    Comment, General Manual Muscle Testing (MMT) Assessment  no focal deficits, fatigues quickly, thin with dec mm mass  -PC     Row Name 05/24/21 1352          Balance    Static Sitting Balance  WFL  -PC     Dynamic Sitting Balance  WFL  -PC     Static Standing Balance  WFL  -PC     Dynamic Standing Balance  mild impairment  -PC     Row Name 05/24/21 1352          Sensory Assessment (Somatosensory)    Sensory Assessment (Somatosensory)  sensation intact  -       User Key  (r) = Recorded By, (t) = Taken By, (c) = Cosigned By    Initials Name Provider Type    PC Denise Espinal, PT Physical Therapist        Goals/Plan     Row Name 05/24/21 1402          Gait Training Goal 1 (PT)    Activity/Assistive Device (Gait Training Goal 1, PT)  gait (walking locomotion);assistive device use  -      [place] Level (Gait Training Goal 1, PT)  standby assist  -PC     Distance (Gait Training Goal 1, PT)  150 ft  -PC     Time Frame (Gait Training Goal 1, PT)  1 week  -PC       User Key  (r) = Recorded By, (t) = Taken By, (c) = Cosigned By    Initials Name Provider Type    PC Denise Espinal, PT Physical Therapist        Clinical Impression     Row Name 05/24/21 1990          Pain Scale: FACES Pre/Post-Treatment    Pain: FACES Scale, Pretreatment  0-->no hurt  -PC     Row Name 05/24/21 7918          Plan of Care Review    Plan of Care Reviewed With  patient  -PC     Outcome Summary  Pt adm with B frontal CVAs with exp aphasia which has resolved, pt has hx of previous CVA with L sided weakness. Pt presents with general weakness, decreased activity tolerance with fatigue with activity. Pt is moving fairly well but needs frequent rest breaks due to feeling SOA, reaching for walls and furniture, rec pt use his cane at home, pt lives with spouse and autistic son, wife reports that she is not able to assist physically, pt has a tri level home with 4 stairs to family room, 8 -10 stiars to bedroom, Pt was independent with bed mobility and sit to stand transfers, required supervision to walk and to go up and down 10 stairs. Pt needed frequent rest breaks but did not have difficulty with going up/down a step physically. Pt is appropriate to go home with assist of wife but there is an inherent fall risk due to chronic medical conditions. PT will follow pt while here to maximize safety for home  -PC     Row Name 05/24/21 8754          Therapy Assessment/Plan (PT)    Rehab Potential (PT)  good, to achieve stated therapy goals  -PC     Criteria for Skilled Interventions Met (PT)  yes;meets criteria  -PC     Row Name 05/24/21 4725          Positioning and Restraints    Pre-Treatment Position  in bed  -PC     Post Treatment Position  bed  -PC     In Bed  supine;call light within reach;encouraged to call for assist  -PC        User Key  (r) = Recorded By, (t) = Taken By, (c) = Cosigned By    Initials Name Provider Type    PC Denise Espinal, PT Physical Therapist        Outcome Measures     Row Name 05/24/21 1403          How much help from another person do you currently need...    Turning from your back to your side while in flat bed without using bedrails?  4  -PC     Moving from lying on back to sitting on the side of a flat bed without bedrails?  4  -PC     Moving to and from a bed to a chair (including a wheelchair)?  4  -PC     Standing up from a chair using your arms (e.g., wheelchair, bedside chair)?  4  -PC     Climbing 3-5 steps with a railing?  3  -PC     To walk in hospital room?  3  -PC     AM-PAC 6 Clicks Score (PT)  22  -PC     Row Name 05/24/21 1403          Modified Jay Scale    Modified Jay Scale  3 - Moderate disability.  Requiring some help, but able to walk without assistance.  -PC     Row Name 05/24/21 1403          Functional Assessment    Outcome Measure Options  AM-PAC 6 Clicks Basic Mobility (PT)  -PC       User Key  (r) = Recorded By, (t) = Taken By, (c) = Cosigned By    Initials Name Provider Type    PC Denise Espinal PT Physical Therapist        Physical Therapy Education                 Title: PT OT SLP Therapies (In Progress)     Topic: Physical Therapy (In Progress)     Point: Mobility training (Done)     Learning Progress Summary           Patient Acceptance, E,D, DU by  at 5/24/2021 1404                   Point: Home exercise program (Not Started)     Learner Progress:  Not documented in this visit.          Point: Body mechanics (Not Started)     Learner Progress:  Not documented in this visit.          Point: Precautions (Not Started)     Learner Progress:  Not documented in this visit.                      User Key     Initials Effective Dates Name Provider Type Discipline     04/03/18 -  Denise Espinal PT Physical Therapist PT              PT Recommendation and Plan  Planned Therapy  Interventions (PT): strengthening, gait training  Plan of Care Reviewed With: patient  Outcome Summary: Pt adm with B frontal CVAs with exp aphasia which has resolved, pt has hx of previous CVA with L sided weakness. Pt presents with general weakness, decreased activity tolerance with fatigue with activity. Pt is moving fairly well but needs frequent rest breaks due to feeling SOA, reaching for walls and furniture, rec pt use his cane at home, pt lives with spouse and autistic son, wife reports that she is not able to assist physically, pt has a tri level home with 4 stairs to family room, 8 -10 stiars to bedroom, Pt was independent with bed mobility and sit to stand transfers, required supervision to walk and to go up and down 10 stairs. Pt needed frequent rest breaks but did not have difficulty with going up/down a step physically. Pt is appropriate to go home with assist of wife but there is an inherent fall risk due to chronic medical conditions. PT will follow pt while here to maximize safety for home     Time Calculation:   PT Charges     Row Name 05/24/21 1404             Time Calculation    Start Time  1315  -PC      Stop Time  1346  -PC      Time Calculation (min)  31 min  -PC      PT Received On  05/24/21  -PC      PT - Next Appointment  05/25/21  -PC      PT Goal Re-Cert Due Date  05/31/21  -PC        User Key  (r) = Recorded By, (t) = Taken By, (c) = Cosigned By    Initials Name Provider Type    PC Denise Espinal PT Physical Therapist        Therapy Charges for Today     Code Description Service Date Service Provider Modifiers Qty    07318556175 HC PT EVAL MOD COMPLEXITY 2 5/24/2021 Denise Espinal, PT GP 1    24297503714 HC PT THER PROC EA 15 MIN 5/24/2021 Denise Espinal, PT GP 1          PT G-Codes  Outcome Measure Options: AM-PAC 6 Clicks Basic Mobility (PT)  AM-PAC 6 Clicks Score (PT): 22  AM-PAC 6 Clicks Score (OT): 19  Modified Keren Scale: 3 - Moderate disability.  Requiring some help, but  able to walk without assistance.    Denise Espinal, PT  5/24/2021

## 2021-05-24 NOTE — PLAN OF CARE
Goal Outcome Evaluation:  Plan of Care Reviewed With: patient     Outcome Summary: Pt adm with B frontal CVAs with exp aphasia which has resolved, pt has hx of previous CVA with L sided weakness. Pt presents with general weakness, decreased activity tolerance with fatigue with activity. Pt is moving fairly well but needs frequent rest breaks due to feeling SOA, reaching for walls and furniture, rec pt use his cane at home, pt lives with spouse and autistic son, wife reports that she is not able to assist physically, pt has a tri level home with 4 stairs to family room, 8 -10 stiars to bedroom, Pt was independent with bed mobility and sit to stand transfers, required supervision to walk and to go up and down 10 stairs. Pt needed frequent rest breaks but did not have difficulty with going up/down a step physically. Pt is appropriate to go home with assist of wife but there is an inherent fall risk due to chronic medical conditions. PT will follow pt while here to maximize safety for home  Patient was intermittently wearing a face mask during this therapy encounter. Therapist used appropriate personal protective equipment including eye protection, mask, and gloves.  Mask used was standard procedure mask. Appropriate PPE was worn during the entire therapy session. Hand hygiene was completed before and after therapy session. Patient is not in enhanced droplet precautions.

## 2021-05-24 NOTE — PROGRESS NOTES
Patient was counseled extensively on Eliquis including the followin) Eliquis's indication, mechanism of action, and dosing  2) Enforced the importance of taking Eliquis as instructed every day and that it is a twice a day medication.  3) Explained possible side effects of Eliquis therapy, including increased risk of bleeding, s/sx of bleeding, and s/sx of any additional clots/PE/CVA.   4) Emphasized the importance of going to the emergency room if any of the following occur: Falling and hitting your head; noticing bright red blood in urine or dark/tarry stools; vomiting up blood or vomit has a coffee-ground like texture; coughing up blood  5) Discussed the importance of speaking with physician/pharmacist when starting any new medications to check for drug interactions with Eliquis  6) Discussed the importance of informing any surgeon or proceduralist that you are on Eliquis and may need to go off prior to the procedure (including spinal/epidural procedures)  7) Discussed what to do about a missed dose (Take as soon as you remember and if it is closer to the time for your next dose, skip the missed dose and go back to your normal time; DO NOT double up doses)  8) Instructed the pt not to take or discontinue any medications without informing his physician/pharmacist     I also explained to the patient that this medication may have a high copay associated with it and to let the cardiologist know if it is unaffordable.     Patient expressed understanding and had no further questions.       Francine Clark, PharmD

## 2021-05-24 NOTE — PROGRESS NOTES
[facility] Medicine Services  PROGRESS NOTE    Patient Name: Carlito Mo  : 1955  MRN: [id number]    Date of Admission: 2021  Primary Care Physician: Florencia Caballero MD    Subjective   Subjective     CC:  Follow-up stroke.    HPI:  Reports he is doing well today.  Denies any new issues.  Breathing comfortably on room air.    Review of Systems  No current fevers or chills  No current shortness of breath or cough  No current nausea, vomiting, or diarrhea  No current chest pain or palpitations    Objective   Objective     Vital Signs:   Temp:  [97.5 °F (36.4 °C)-98.5 °F (36.9 °C)] 97.5 °F (36.4 °C)  Heart Rate:  [55-69] 55  Resp:  [16-18] 17  BP: (123-150)/(63-82) 123/73  Total (NIH Stroke Scale): 0     Physical Exam:  Constitutional:Awake, alert  HENT: NCAT, mucous membranes moist, neck supple  Respiratory: Clear to auscultation bilaterally, respiratory effort normal, nonlabored breathing   Cardiovascular: RRR, normal radial pulses  Gastrointestinal: Positive bowel sounds, soft, nontender, nondistended  Musculoskeletal: Normal musculature for age, no lower extremity edema, BMI 25  Psychiatric: Flat affect, cooperative, conversational  Neurologic: Word finding difficulties improved with relatively clear speech, oriented, follows commands  Skin: No rashes or jaundice, warm      Results Reviewed:  Results from last 7 days   Lab Units 21  0510 05/23/21  0706 21  0531   WBC 10*3/mm3 6.16 5.74 5.47   HEMOGLOBIN g/dL 10.6* 10.1* 9.7*   HEMATOCRIT % 32.5* 32.0* 30.1*   PLATELETS 10*3/mm3 238 241 222     Results from last 7 days   Lab Units 21  0510 21  0705 21  0531 21  0941 21  2150   SODIUM mmol/L 138 139 139  --  141   POTASSIUM mmol/L 4.6 4.2 4.3  --  4.1   CHLORIDE mmol/L 104 104 106  --  111*   CO2 mmol/L 26.1 27.0 26.9  --  24.0   BUN mg/dL 40* 35* 34*  --  33*   CREATININE mg/dL 2.00* 1.90* 2.04*  --  2.14*   GLUCOSE mg/dL 107* 85 70  --  183*   CALCIUM  mg/dL 8.6 8.4* 8.4*  --  8.0*   ALT (SGPT) U/L 11 11 10  --  9   AST (SGOT) U/L 10 12 11  --  9   TROPONIN T ng/mL  --   --   --  0.082* 0.076*   PROBNP pg/mL  --   --   --   --  10,859.0*     Estimated Creatinine Clearance: 43.4 mL/min (A) (by C-G formula based on SCr of 2 mg/dL (H)).    Microbiology Results Abnormal     Procedure Component Value - Date/Time    COVID PRE-OP / PRE-PROCEDURE SCREENING ORDER (NO ISOLATION) - Swab, Nasopharynx [[id number]]  (Normal) Collected: 05/20/21 2356    Lab Status: Final result Specimen: Swab from Nasopharynx Updated: 05/21/21 1310    Narrative:      The following orders were created for panel order COVID PRE-OP / PRE-PROCEDURE SCREENING ORDER (NO ISOLATION) - Swab, Nasopharynx.  Procedure                               Abnormality         Status                     ---------                               -----------         ------                     COVID-19,APTIMA PANTHER,...[[id number]]  Normal              Final result                 Please view results for these tests on the individual orders.    COVID-19,APTIMA PANTHER,[facility]AR IN-HOUSE, NP/OP SWAB IN UTM/VTM/SALINE TRANSPORT MEDIA,24 HR TAT - Swab, Nasopharynx [[id number]]  (Normal) Collected: 05/20/21 2356    Lab Status: Final result Specimen: Swab from Nasopharynx Updated: 05/21/21 1310     COVID19 Not Detected    Narrative:      Fact sheet for providers: https://www.fda.gov/media/648426/download     Fact sheet for patients: https://www.fda.gov/media/246547/download    Test performed by RT PCR.          Imaging Results (Last 24 Hours)     ** No results found for the last 24 hours. **          Results for orders placed during the hospital encounter of 05/20/21    Adult Transthoracic Echo Complete W/ Cont if Necessary Per Protocol (With Agitated Saline)    Interpretation Summary  · There is moderate to severe global hypokinesis  · Left ventricular ejection fraction appears to be 31 - 35%.  · Left ventricular diastolic function  is consistent with (grade III w/high LAP) fixed restrictive pattern  · The right ventricular cavity is mildly dilated. Moderately reduced right ventricular systolic function noted.  · The left atrial cavity is moderately dilated.  · Mild mitral valve regurgitation is present  · There is a small (<1cm) pericardial effusion.  · There is a large left pleural effusion.      I have reviewed the medications:  Scheduled Meds:apixaban, 5 mg, Oral, Q12H  aspirin, 81 mg, Oral, Daily  atorvastatin, 80 mg, Oral, Nightly  carvedilol, 25 mg, Oral, BID With Meals  folic acid, 1,000 mcg, Oral, Daily  furosemide, 40 mg, Oral, Daily  insulin glargine, 3 Units, Subcutaneous, Nightly  insulin lispro, 0-7 Units, Subcutaneous, TID AC  lisinopril, 30 mg, Oral, Daily  melatonin, 3 mg, Oral, Nightly  sodium chloride, 10 mL, Intravenous, Q12H  spironolactone, 25 mg, Oral, Daily  tamsulosin, 0.4 mg, Oral, Nightly  vitamin B-12, 1,000 mcg, Oral, Daily      Continuous Infusions:   PRN Meds:.•  acetaminophen **OR** acetaminophen  •  dextrose  •  dextrose  •  glucagon (human recombinant)  •  hydrALAZINE  •  ondansetron  •  [COMPLETED] Insert peripheral IV **AND** sodium chloride  •  sodium chloride    Assessment/Plan   Assessment & Plan     Active Hospital Problems    Diagnosis  POA   • **Embolic stroke (CMS/HCC) [I63.9]  Yes   • Cardiomyopathy (CMS/HCC) [I42.9]  Yes   • Patent foramen ovale [Q21.1]  Not Applicable   • Pleural effusion [J90]  Yes   • Debility [R53.81]  Yes   • Essential hypertension [I10]  Yes   • Anemia, chronic renal failure, stage 3 (moderate) (CMS/HCC) [N18.30, D63.1]  Yes   • Chronically elevated troponin [R77.8]  Yes   • Coronary artery disease involving native coronary artery of native heart without angina pectoris [I25.10]  Yes   • Insomnia [G47.00]  Yes   • YAW (obstructive sleep apnea) [G47.33]  Yes   • Acute on chronic combined systolic and diastolic congestive heart failure (CMS/HCC) [I50.43]  Yes   • Acquired  hypothyroidism [E03.9]  Yes   • Stage 3b chronic kidney disease (CMS/HCC) [N18.32]  Yes   • 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        Brief Hospital Course to date:  Carlito Mo is a 66 y.o. male presents with word finding difficulty and found to have embolic strokes.    Discussion:   Remained stable.  Morning glucose 107.  Continue current insulin regimen.  Currently on oral diuretics.  Receiving medical management for new cardiomyopathy.  Volume status has improved.  Possible for discharge home with home health today if cardiology in agreement    Embolic strokes:  MRI showing multiple new areas of infarction with chronic microhemorrhages. Echo 5/27/2020 with EF 60% and normal systolic function. TTE repeat ordered. Neurology consulted. Allowing permissive hypertension for now, but will normalize BP once cleared by neurology. ASA/statin continued. Previously had recurrent strokes with Asa/plavix and had GI bleeding while on warfarin.  Neurology now have patient on aspirin and Eliquis and recommend follow-up in neurology clinic in 3 months.    Acute on chronic diastolic HF/CAD/hypertension/chronically elevated troponin: Cardiology consulted. Not felt to be cardiac chest pain and troponin chronically up d/t renal issues. Received IV Lasix for acute heart failure doses for hypervolemia and pulmonary edema.  Now transitioned to oral Lasix..  Home medications include both carvedilol and metoprolol.  Most recent cardiology note from last year indicates metoprolol succinate 50 mg daily.  Patient now may benefit better from carvedilol for improved blood pressure control.  Continue carvedilol and monitor.    Chronic kidney disease:  Followed by Dr. Huddleston. Creatinine close to more recent baseline of 2.3-2.6.  Currently at baseline renal function. Avoid nephrotoxins    Type 2  diabetes mellitus:  Basal insulin further decreased from home levels.  Correction scale.  Monitor glucose and adjust as needed.    Hyperlipidemia: Statin.  Stable.    Essential hypertension: Initially allowed permissive hypertension following stroke.  Gradually working to normalize.  Hydralazine and lisinopril restarted now.    Anemia:  Initial ER hemoglobin 7.1.  Repeat was approximately 10.  We suspect lab error.  His chronic baseline is around 10.  Plan to monitor.  He is reportedly plan to do capsule endoscopy with outpatient gastroenterology at some point.  We recommend he continue with this plan.    DVT Prophylaxis: Anticoagulation    Disposition: Patient would like to go home with home health when stabilized.    CODE STATUS:   Code Status and Medical Interventions:   Ordered at: 05/21/21 0023     Code Status:    CPR     Medical Interventions (Level of Support Prior to Arrest):    Full       Delmar Carmichael MD  05/24/21

## 2021-05-24 NOTE — DISCHARGE SUMMARY
[facility] Medicine Services  DISCHARGE SUMMARY    Patient Name: Carlito Mo  : 1955  MRN: [id number]    Date of Admission: 2021  9:40 PM  Date of Discharge:  2021  Primary Care Physician: Florencia Caballero MD    Consults     Date and Time Order Name Status Description    2021 12:23 AM Inpatient Neurology Consult Stroke Completed     2021 12:21 AM Inpatient Cardiology Consult Completed     2021 11:20 PM [facility] (on-call MD unless specified) Details Completed           Hospital Course     Presenting Problem:   Expressive aphasia [R47.01]  Anemia, unspecified type [D64.9]  Acute congestive heart failure, unspecified heart failure type (CMS/HCC) [I50.9]    Active Hospital Problems    Diagnosis  POA   • **Embolic stroke (CMS/HCC) [I63.9]  Yes   • Cardiomyopathy (CMS/HCC) [I42.9]  Yes   • Patent foramen ovale [Q21.1]  Not Applicable   • Pleural effusion [J90]  Yes   • Debility [R53.81]  Yes   • Essential hypertension [I10]  Yes   • Anemia, chronic renal failure, stage 3 (moderate) (CMS/HCC) [N18.30, D63.1]  Yes   • Chronically elevated troponin [R77.8]  Yes   • Coronary artery disease involving native coronary artery of native heart without angina pectoris [I25.10]  Yes   • Insomnia [G47.00]  Yes   • YAW (obstructive sleep apnea) [G47.33]  Yes   • Acute on chronic combined systolic and diastolic congestive heart failure (CMS/HCC) [I50.43]  Yes   • Acquired hypothyroidism [E03.9]  Yes   • Stage 3b chronic kidney disease (CMS/HCC) [N18.32]  Yes   • 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          Hospital Course:  Carlito Mo is a 66 y.o. male presents with word finding difficulty and found to have embolic strokes.     Discussion:   Evaluated by physical therapy today and felt to be stable to discharge home.  Currently able to do stairs.  Case  discussed with cardiology consult team who is cleared for discharge today.  I discussed discharge cardiac medications with their service and ordered as below.     Embolic strokes:  MRI showing multiple new areas of infarction felt to be embolic with chronic microhemorrhages. Echo 5/27/2020 with EF 60% and normal systolic function. TTE repeat ordered. Neurology consulted. Allowing permissive hypertension for now, but will normalize BP once cleared by neurology. ASA/statin continued. Previously had recurrent strokes with Asa/plavix and had GI bleeding while on warfarin.  Neurology now have patient on aspirin and Eliquis and recommend follow-up in neurology clinic in 3 months.     Acute on chronic diastolic HF/CAD/hypertension/chronically elevated troponin: Cardiology consulted. Not felt to be cardiac chest pain and troponin chronically up d/t renal issues. Received IV Lasix for acute heart failure doses for hypervolemia and pulmonary edema.  Now transitioned to oral Lasix and cardiology recommended 40 mg daily..   New cardiomyopathy on echocardiogram.  Beta-blocker now carvedilol increased, lisinopril increased, spironolactone added and hydralazine stopped per cardiology.  Cardiology to arrange follow-up in their clinic in 1 week and I recommend BMP to continue monitoring of potassium level and renal function.     Chronic kidney disease:  Followed by Dr. Huddleston. Creatinine close to more recent baseline of 2.3-2.6.  Currently at baseline renal function.  Patient will follow up with his nephrologist at discharge.     Type 2 diabetes mellitus:  Monitor glucose and adjust insulin as needed.  Eating less after acute illness.  Home basal insulin decreased for discharge.  May need to return back to home dose if appetite increases.  Primary care follow-up.     Hyperlipidemia: Statin.  Stable.     Essential hypertension: Medications adjusted per my discussions with cardiology as per above.     Anemia:  Initial ER hemoglobin 7.1.   Repeat was approximately 10.  We suspect lab error.  His chronic baseline is around 10.  Plan to monitor.  He is reportedly plan to do capsule endoscopy with outpatient gastroenterology at some point.  We recommend he continue with this previous plan.     At the time of discharge patient was told to take all medications as prescribed, keep all follow-up appointments, and call their doctor or return to the hospital with any worsening or concerning symptoms.    Please note that this note was made using Dragon voice recognition software        Day of Discharge     HPI:   Reports he is doing well today.  Denies any new issues.  Breathing comfortably on room air.     ROS:  No current fevers or chills  No current shortness of breath or cough  No current nausea, vomiting, or diarrhea  No current chest pain or palpitations    Vital Signs:   Temp:  [97.5 °F (36.4 °C)-98.5 °F (36.9 °C)] 98 °F (36.7 °C)  Heart Rate:  [55-66] 59  Resp:  [16-18] 16  BP: (123-150)/(63-82) 137/79     Physical Exam:  Constitutional:Awake, alert  HENT: NCAT, mucous membranes moist, neck supple  Respiratory: Clear to auscultation bilaterally, respiratory effort normal, nonlabored breathing   Cardiovascular: RRR, normal radial pulses  Gastrointestinal: Positive bowel sounds, soft, nontender, nondistended  Musculoskeletal: Normal musculature for age, no lower extremity edema, BMI 25  Psychiatric: Flat affect, cooperative, conversational  Neurologic: Word finding difficulties improved with relatively clear speech, oriented, follows commands  Skin: No rashes or jaundice, warm    Pertinent  and/or Most Recent Results     Results from last 7 days   Lab Units 05/24/21  0510 05/23/21  0706 05/23/21  0705 05/22/21  0531 05/21/21  0941 05/21/21  0411 05/20/21  2150   WBC 10*3/mm3 6.16 5.74  --  5.47  --  7.20 5.58   HEMOGLOBIN g/dL 10.6* 10.1*  --  9.7* 10.0* 10.1* 7.1*   HEMATOCRIT % 32.5* 32.0*  --  30.1* 32.7* 32.1* 23.3*   PLATELETS 10*3/mm3 238 241  --   222  --  229 201   SODIUM mmol/L 138  --  139 139  --  140 141   POTASSIUM mmol/L 4.6  --  4.2 4.3  --  4.1 4.1   CHLORIDE mmol/L 104  --  104 106  --  107 111*   CO2 mmol/L 26.1  --  27.0 26.9  --  23.7 24.0   BUN mg/dL 40*  --  35* 34*  --  35* 33*   CREATININE mg/dL 2.00*  --  1.90* 2.04*  --  2.37* 2.14*   GLUCOSE mg/dL 107*  --  85 70  --  134* 183*   CALCIUM mg/dL 8.6  --  8.4* 8.4*  --  8.6 8.0*     Results from last 7 days   Lab Units 05/24/21  0510 05/23/21  0705 05/22/21  0531 05/21/21  0411 05/20/21  2150   BILIRUBIN mg/dL 0.3 0.3 0.3 0.2 0.2   ALK PHOS U/L 84 73 72 79 81   ALT (SGPT) U/L 11 11 10 9 9   AST (SGOT) U/L 10 12 11 10 9     Results from last 7 days   Lab Units 05/21/21  0411   CHOLESTEROL mg/dL 166   TRIGLYCERIDES mg/dL 61   HDL CHOL mg/dL 46     Results from last 7 days   Lab Units 05/21/21  0941 05/21/21  0411 05/20/21  2150   TSH uIU/mL 3.870  --   --    HEMOGLOBIN A1C %  --  8.30*  --    PROBNP pg/mL  --   --  10,859.0*   TROPONIN T ng/mL 0.082*  --  0.076*       Brief Urine Lab Results  (Last result in the past 365 days)      Color   Clarity   Blood   Leuk Est   Nitrite   Protein   CREAT   Urine HCG        05/25/20 1717 Yellow Clear Negative Negative Negative 100 mg/dL (2+)               Microbiology Results Abnormal     Procedure Component Value - Date/Time    COVID PRE-OP / PRE-PROCEDURE SCREENING ORDER (NO ISOLATION) - Swab, Nasopharynx [[id number]]  (Normal) Collected: 05/20/21 4075    Lab Status: Final result Specimen: Swab from Nasopharynx Updated: 05/21/21 1310    Narrative:      The following orders were created for panel order COVID PRE-OP / PRE-PROCEDURE SCREENING ORDER (NO ISOLATION) - Swab, Nasopharynx.  Procedure                               Abnormality         Status                     ---------                               -----------         ------                     COVID-19,APTIMA PANTHER,...[[id number]]  Normal              Final result                 Please view  results for these tests on the individual orders.    COVID-19,APTIMA PANTHER,[facility] IN-HOUSE, NP/OP SWAB IN UTM/VTM/SALINE TRANSPORT MEDIA,24 HR TAT - Swab, Nasopharynx [[id number]]  (Normal) Collected: 05/20/21 2356    Lab Status: Final result Specimen: Swab from Nasopharynx Updated: 05/21/21 1310     COVID19 Not Detected    Narrative:      Fact sheet for providers: https://www.fda.gov/media/065861/download     Fact sheet for patients: https://www.fda.gov/media/080907/download    Test performed by RT PCR.          Imaging Results (All)     Procedure Component Value Units Date/Time    MRI Brain Without Contrast [[id number]] Collected: 05/21/21 0853     Updated: 05/21/21 0906    Narrative:      MRI OF THE BRAIN WITHOUT CONTRAST     CLINICAL HISTORY: Expressive aphasia.     TECHNIQUE: MRI of the brain was obtained with sagittal T1, axial T1,  axial FLAIR, axial T2, axial diffusion, and axial gradient echo images.     COMPARISON: Comparison is made to previous MRI of the brain dated  07/29/2019.     FINDINGS:     There are foci of restricted diffusion which involve the anterior left  frontal lobe cortex and white matter, in addition to the left insular  cortex. These are seen as multiple discontiguous foci of small areas of  restricted diffusion. The largest of these areas measures approximately  11 mm in diameter. For the most part, these areas are subcentimeter in  size. Findings are compatible with areas of acute infarction within left  MCA distribution. Additionally, there is a focus of restricted diffusion  involving the white matter of the right cingulate gyrus measuring up to  approximately 11 mm in diameter, compatible with an acute infarct within  the right GARTH distribution.     There are severe and confluent changes of chronic small vessel ischemic  phenomena. Old lacunar disease is seen within the thalami, lentiform  nuclei, and the caudates.     There are findings compatible with a small chronic infarct within  the  right cerebellar hemisphere extending to the adjacent cerebellar vermis.  This focus of encephalomalacia measures up to 1.7 x 1.1 cm in greatest  axial dimensions. Subtle areas of susceptibility artifact are seen at  this site on the prior exam suggesting some chronic blood products at  this site. This chronic infarct is unchanged in appearance since the  prior examination. Additional subcentimeter chronic infarcts are  identified within the left cerebellar hemisphere, compatible with  chronic infarcts within the left PICA distribution. Within the superior  and lateral aspect of the left parietal lobe, there is a focus of  encephalomalacia measuring up to approximately 2.9 x 2.1 cm in greatest  axial dimensions, compatible with a chronic infarct within the left MCA  distribution.     On the prior study of 07/29/2019, there were punctate areas of  susceptibility artifact appreciated within the white matter of the right  cerebellar hemisphere, the corticomedullary interface of the right  frontal lobe, the left internal capsule, the thalami, and the  corticomedullary interfaces of the left frontal lobe, as well as both  parietal lobes. Most of these areas are not well delineated on the less  sensitive gradient echo sequence. The findings within the thalami and  the the right cerebral hemisphere are suggestive of hypertensive  encephalopathic changes, although some of the additional areas of  susceptibility artifact could be due to chronic microhemorrhages  secondary to amyloid. Overall, no convincing interval change is noted.     The major intracranial flow related signal voids are unremarkable. The  midline intracranial anatomy is unremarkable.     There is mild-to-moderate mucosal thickening within the sphenoid sinus  ethmoid air cells. A mucous retention cyst is appreciated within the  right maxillary sinus.       Impression:         Multiple small scattered discontiguous foci of restricted diffusion are  noted  within the anterior aspect left frontal lobe and the left insular  cortex compatible with multiple small foci of acute infarction. These  acute infarcts are within the left MCA distribution. Also, within the  anterior aspect of the right frontal lobe involving the white matter of  the right cingulate gyrus, there is an additional focus of restricted  diffusion measuring up to 11 mm in diameter, compatible with an acute  infarct within the right GARTH distribution.     Again noted are severe extensive and confluent changes of chronic small  vessel ischemic phenomena. Extensive old lacunar disease is also  identified.     Chronic microhemorrhages are appreciated potentially representative of a  combination of hypertensive encephalopathic changes and chronic  microhemorrhages secondary to amyloid.     Again noted is a chronic infarct within the right cerebellar hemisphere  within the right PICA distribution. Also, there are findings compatible  with a chronic infarct within the superior aspect of the left parietal  lobe and the left MCA distribution. These chronic infarcts were also  evident on the prior examination.     These findings were discussed with Dr. Kramer on 05/21/2021 at  approximately 8:27 AM.     This report was finalized on 5/21/2021 9:03 AM by Dr. Matthieu Ortega M.D.       CT Head Without Contrast [[id number]] Collected: 05/20/21 2353     Updated: 05/20/21 2359    Narrative:      CT HEAD WITHOUT CONTRAST     HISTORY: Expressive achalasia     COMPARISON: 05/26/2020     TECHNIQUE: Axial CT imaging was obtained through the brain. No IV  contrast was administered.     FINDINGS:  No acute intracranial hemorrhage is seen. There is diffuse atrophy.  There is fairly extensive periventricular and deep white matter  microangiopathic change. There is no midline shift or mass effect.  Mucous retention cyst is again noted within the right maxillary sinus.  Partially calcified scalp lesions are noted, and may represent  sebaceous  cysts.       Impression:      No acute intracranial findings.     Radiation dose reduction techniques were utilized, including automated  exposure control and exposure modulation based on body size.     This report was finalized on 5/20/2021 11:56 PM by Dr. Alice Allen M.D.       XR Chest 1 View [[id number]] Collected: 05/20/21 2213     Updated: 05/20/21 2217    Narrative:      SINGLE VIEW OF THE CHEST     HISTORY: Shortness of breath     COMPARISON: 05/02/2020     FINDINGS:  Cardiomegaly is present. There is vascular congestion. Patient is status  post median sternotomy with coronary artery bypass grafting. Dense  bibasilar consolidation is identified, as are bilateral pleural  effusions, right greater than left. No pneumothorax is seen.       Impression:         1. Cardiomegaly with vascular congestion.  2. Nonspecific bibasilar consolidation and bilateral pleural effusions.     This report was finalized on 5/20/2021 10:14 PM by Dr. Alice Allen M.D.             Results for orders placed during the hospital encounter of 05/20/21    Duplex Venous Lower Extremity - Bilateral CAR    Interpretation Summary  · Normal bilateral lower extremity venous duplex scan.      Results for orders placed during the hospital encounter of 05/20/21    Duplex Venous Lower Extremity - Bilateral CAR    Interpretation Summary  · Normal bilateral lower extremity venous duplex scan.      Results for orders placed during the hospital encounter of 05/20/21    Adult Transthoracic Echo Complete W/ Cont if Necessary Per Protocol (With Agitated Saline)    Interpretation Summary  · There is moderate to severe global hypokinesis  · Left ventricular ejection fraction appears to be 31 - 35%.  · Left ventricular diastolic function is consistent with (grade III w/high LAP) fixed restrictive pattern  · The right ventricular cavity is mildly dilated. Moderately reduced right ventricular systolic function noted.  · The left atrial  cavity is moderately dilated.  · Mild mitral valve regurgitation is present  · There is a small (<1cm) pericardial effusion.  · There is a large left pleural effusion.      Discharge Details        Discharge Medications      New Medications      Instructions Start Date   acetaminophen 325 MG tablet  Commonly known as: TYLENOL   650 mg, Oral, Every 4 Hours PRN      apixaban 5 MG tablet tablet  Commonly known as: ELIQUIS   5 mg, Oral, Every 12 Hours Scheduled      melatonin 3 MG tablet   3 mg, Oral, Nightly      spironolactone 25 MG tablet  Commonly known as: ALDACTONE   25 mg, Oral, Daily   Start Date: May 25, 2021        Changes to Medications      Instructions Start Date   atorvastatin 80 MG tablet  Commonly known as: LIPITOR  What changed:   · medication strength  · how much to take   80 mg, Oral, Nightly      BASAGLAR KWIKPEN 100 UNIT/ML injection pen  What changed: how much to take   4 Units, Subcutaneous, Nightly, Patient says he does not take consistently      carvedilol 25 MG tablet  Commonly known as: COREG  What changed:   · medication strength  · how much to take  · how to take this   25 mg, Oral, 2 Times Daily With Meals      furosemide 40 MG tablet  Commonly known as: LASIX  What changed:   · how to take this  · when to take this  · additional instructions  · Another medication with the same name was removed. Continue taking this medication, and follow the directions you see here.   40 mg, Oral, Daily      lisinopril 10 MG tablet  Commonly known as: PRINIVIL,ZESTRIL  What changed: how much to take   20 mg, Oral, Daily         Continue These Medications      Instructions Start Date   Apidra 100 UNIT/ML injection  Generic drug: insulin glulisine   2 Units, Subcutaneous, 3 Times Daily Before Meals, Patient says he does not take consistently      aspirin 81 MG EC tablet   81 mg, Oral, Daily      folic acid 1 MG tablet  Commonly known as: FOLVITE   TAKE ONE TABLET BY MOUTH DAILY      tamsulosin 0.4 MG capsule  24 hr capsule  Commonly known as: FLOMAX   1 capsule, Oral, Nightly      vitamin B-12 1000 MCG tablet  Commonly known as: CYANOCOBALAMIN   TAKE ONE TABLET BY MOUTH DAILY      vitamin D3 125 MCG (5000 UT) capsule capsule   5,000 Units, Oral, Daily         Stop These Medications    hydrALAZINE 50 MG tablet  Commonly known as: APRESOLINE     metoprolol succinate XL 50 MG 24 hr tablet  Commonly known as: TOPROL-XL            Allergies   Allergen Reactions   • Prozac [Fluoxetine Hcl] Other (See Comments)     shaking         Discharge Disposition:  Home or Self Care    Diet:  Hospital:  Diet Order   Procedures   • Diet Regular; Cardiac, Consistent Carbohydrate       Activity:  Activity Instructions     Up WIth Assist                 CODE STATUS:    Code Status and Medical Interventions:   Ordered at: 05/21/21 0023     Code Status:    CPR     Medical Interventions (Level of Support Prior to Arrest):    Full       Future Appointments   Date Time Provider Department Center   6/2/2021 10:30 AM Nida Lopes APRN [facility]   7/7/2021  2:10 PM LAB CHAIR 4 [facility]  LAB [facility] [place]   7/7/2021  2:40 PM Kim Hoyos MD PhD [facility] [place]   9/29/2021  9:00 AM Alisia Paula APRN [facility]       Additional Instructions for the Follow-ups that You Need to Schedule     Discharge Follow-up with PCP   As directed       Currently Documented PCP:    Florencia Caballero MD    PCP Phone Number:    [phone]     Follow Up Details: 1 to 2 weeks primary care follow-up.         Discharge Follow-up with Specified Provider: [facility] in 3 months.   As directed      To: [facility] follow-[facility] in 3 months.         Discharge Follow-up with Specified Provider: Cardiology clinic follow-up in 1 week.  Call cardiology clinic with questions.   As directed      To: Cardiology clinic follow-up in 1 week.  Call cardiology clinic with questions.         Discharge Follow-up with  Specified Provider: Follow-up with your nephrologist in 2 to 4 weeks.   As directed      To: Follow-up with your nephrologist in 2 to 4 weeks.                     Delmar Carmichael MD  05/24/21      Time Spent on Discharge:  I spent approximately 40  minutes on this discharge activity which included: face-to-face encounter with the patient, reviewing the data in the system, coordination of the care with the nursing staff as well as consultants, documentation, and entering orders.

## 2021-05-24 NOTE — PROGRESS NOTES
"Patient Name: Carlito Mo  :1955  66 y.o.      Patient Care Team:  Florencia Caballero MD as PCP - General (Internal Medicine)  Amber Murguia MD as Consulting Physician (Cardiology)  Sera La [facility] as Pharmacist  Seth Ladd MD as Surgeon (General Surgery)  Kim Hoyos MD PhD as Consulting Physician (Hematology and Oncology)  Jerome Diallo MD as Referring Physician (Family Medicine)  Jose Enrique Louie MD as Consulting Physician (Gastroenterology)  Nima Huddleston MD as Consulting Physician (Nephrology)    Interval History:   Admitted with strokelike symptoms.    Subjective:  Following for cardiomyopathy    Objective 1.  Leukocytosis and urinary tract infection.  2.  Mitral valve prolapse with mitral regurgitation 1.  Leukocytosis with urinary tract infection.  Decided to stop hello hello hello  Vital Signs  Temp:  [97.5 °F (36.4 °C)-98.5 °F (36.9 °C)] 97.5 °F (36.4 °C)  Heart Rate:  [55-69] 62  Resp:  [16-18] 17  BP: (123-150)/(63-82) 123/73    Intake/Output Summary (Last 24 hours) at 2021 1253  Last data filed at 2021 2222  Gross per 24 hour   Intake 100 ml   Output 1135 ml   Net -1035 ml     Flowsheet Rows      First Filed Value   Admission Height  182.9 cm (72\") Documented at 2021 0050   Admission Weight  82.8 kg (182 lb 9.6 oz) Documented at 2021 0050          Physical Exam:   General Appearance:    Alert, cooperative, in no acute distress   Lungs:     Clear to auscultation.  Normal respiratory effort and rate.      Heart:    Regular rhythm and normal rate, normal S1 and S2, no murmurs, gallops or rubs.     Chest Wall:    No abnormalities observed   Abdomen:     Soft, nontender, positive bowel sounds.     Extremities:   no cyanosis, clubbing or edema.  No marked joint deformities.  Adequate musculoskeletal strength.       Results Review:    Results from last 7 days   Lab Units 21  0510   SODIUM mmol/L 138   POTASSIUM mmol/L 4.6   CHLORIDE " mmol/L 104   CO2 mmol/L 26.1   BUN mg/dL 40*   CREATININE mg/dL 2.00*   GLUCOSE mg/dL 107*   CALCIUM mg/dL 8.6     Results from last 7 days   Lab Units 05/21/21  0941 05/20/21  2150   TROPONIN T ng/mL 0.082* 0.076*     Results from last 7 days   Lab Units 05/24/21  0510   WBC 10*3/mm3 6.16   HEMOGLOBIN g/dL 10.6*   HEMATOCRIT % 32.5*   PLATELETS 10*3/mm3 238         Results from last 7 days   Lab Units 05/21/21  0411   CHOLESTEROL mg/dL 166         Results from last 7 days   Lab Units 05/21/21  0411   CHOLESTEROL mg/dL 166   TRIGLYCERIDES mg/dL 61   HDL CHOL mg/dL 46   LDL CHOL mg/dL 108*         Medication Review:   apixaban, 5 mg, Oral, Q12H  aspirin, 81 mg, Oral, Daily  atorvastatin, 80 mg, Oral, Nightly  carvedilol, 25 mg, Oral, BID With Meals  folic acid, 1,000 mcg, Oral, Daily  furosemide, 40 mg, Oral, Daily  insulin glargine, 3 Units, Subcutaneous, Nightly  insulin lispro, 0-7 Units, Subcutaneous, TID AC  lisinopril, 30 mg, Oral, Daily  melatonin, 3 mg, Oral, Nightly  sodium chloride, 10 mL, Intravenous, Q12H  spironolactone, 25 mg, Oral, Daily  tamsulosin, 0.4 mg, Oral, Nightly  vitamin B-12, 1,000 mcg, Oral, Daily              Assessment/Plan         1.  Chronic systolic and diastolic heart failure.    Currently on carvedilol, lisinopril, spironolactone and oral Lasix.  2.  Coronary artery disease.  He had a bypass surgery in 2001.   He is currently on aspirin 81 mg a day.  In the past we have had to stop that because of melena.  3.  Stroke.  This appears to be acute.  He has a history of strokes.  He had a transesophageal echocardiogram which did not show a cardiac source of embolus.  Zio patch did not show atrial fibrillation.  Full anticoagulation with Eliquis was recommended by his neurologist because he had a stroke on aspirin and Plavix combination.  He had a transthoracic echocardiogram showing his ejection fraction to be about 30 to 35%.  I do not think a repeat transesophageal echocardiogram will  yield much information.  He probably just needs to be on full anticoagulation and will have to see if he tolerates it.  4.  Hypertension.    Monitor.  5.  Hyperlipidemia.  Continue atorvastatin 80 a day.  6.  Chronic kidney disease.  He follows with Dr. Huddleston  7.  Right bundle branch block  8.  Chronically elevated troponin  9.    Gastritis.  Followed by Dr. Louie.  10.  Diabetes on insulin  11.  Obstructive sleep apnea on CPAP  12.  Orthostatic hypotension likely secondary to neuropathy from diabetes.    I agree with discharge today on 40 mg of Lasix.  I will contact our schedulers to get him set up for a follow-up appointment in 1 week.       Amber Murguia MD, [facility]  05/24/21  12:53 EDT

## 2021-05-24 NOTE — CASE MANAGEMENT/SOCIAL WORK
Continued Stay Note  [facility]     Patient Name: Carlito Mo  MRN: [id number]  Today's Date: 5/24/2021    Admit Date: 5/20/2021    Discharge Plan     Row Name 05/24/21 1538       Plan    Plan  Plans are home with [facility].        Discharge Codes    No documentation.       Expected Discharge Date and Time     Expected Discharge Date Expected Discharge Time    May 24, 2021             Zahida Jimenez RN

## 2021-05-24 NOTE — DISCHARGE PLACEMENT REQUEST
"Carlito Mo (66 y.o. Male)     Date of Birth Social Security Number Address Home Phone MRN    1955  [id number] [facility] [phone] [id number]    Jehovah's witness Marital Status          Zoroastrian        Admission Date Admission Type Admitting Provider Attending Provider Department, Room/Bed    5/20/21 Emergency Delmar Carmichael MD Furlow, Stephen Matthew, MD [address], [address]    Discharge Date Discharge Disposition Discharge Destination                       Attending Provider: Delmar Carmichael MD    Allergies: Prozac [Fluoxetine Hcl]    Isolation: None   Infection: None   Code Status: CPR    Ht: 182.9 cm (72.01\")   Wt: 84.4 kg (186 lb)    Admission Cmt: None   Principal Problem: Embolic stroke (CMS/HCC) [I63.9]                 Active Insurance as of 5/20/2021     Primary Coverage     Payor Plan Insurance Group Employer/Plan Group    MEDICARE MEDICARE A & B      Payor Plan Address Payor Plan Phone Number Payor Plan Fax Number Effective Dates    [address] [phone]  5/1/2020 - None Entered    [facility] [place] [address]       Subscriber Name Subscriber Birth Date Member ID       CARLITO MO 1955 [id number]           Secondary Coverage     Payor Plan Insurance Group Employer/Plan Group    ANTHEM MEDICAID ANTHEM MEDICAID [id number]     Payor Plan Address Payor Plan Phone Number Payor Plan Fax Number Effective Dates    [address] [address] [phone]  7/1/2018 - None Entered    [facility] [address]       Subscriber Name Subscriber Birth Date Member ID       CARLITO MO 1955 [id number]                 Emergency Contacts      (Rel.) Home Phone Work Phone Mobile Phone    Lissette Mo (Spouse) [phone] -- --              "

## 2021-05-25 ENCOUNTER — READMISSION MANAGEMENT (OUTPATIENT)
Dept: CALL CENTER | Facility: HOSPITAL | Age: 66
End: 2021-05-25

## 2021-05-25 NOTE — OUTREACH NOTE
Prep Survey      Responses   [facility] facility patient discharged from?  [place]   Is LACE score < 7 ?  No   Emergency Room discharge w/ pulse ox?  No   Eligibility  Readm Mgmt   Discharge diagnosis  Embolic stroke   Does the patient have one of the following disease processes/diagnoses(primary or secondary)?  Stroke (TIA)   Does the patient have Home health ordered?  Yes   What is the Home health agency?   [facility]   Is there a DME ordered?  No   Prep survey completed?  Yes          Amber Mcmillan RN

## 2021-05-26 NOTE — CASE MANAGEMENT/SOCIAL WORK
Case Management Discharge Note      Final Note: Pt was dc'd home with [facility]    Provided Post Acute Provider List?: Yes  Post Acute Provider List: Home Health    Selected Continued Care - Discharged on 5/24/2021 Admission date: 5/20/2021 - Discharge disposition: Home or Self Care    Destination    No services have been selected for the patient.              Durable Medical Equipment    No services have been selected for the patient.              Dialysis/Infusion    No services have been selected for the patient.              Home Medical Care     Service Provider Selected Services Address Phone Fax Patient Preferred    [facility] [place]  [facility] [address] [address] [phone] --          Therapy    No services have been selected for the patient.              Community Resources    No services have been selected for the patient.                  Transportation Services  Private: Car    Final Discharge Disposition Code: 06 - home with home health care

## 2021-06-01 ENCOUNTER — READMISSION MANAGEMENT (OUTPATIENT)
Dept: CALL CENTER | Facility: HOSPITAL | Age: 66
End: 2021-06-01

## 2021-06-01 NOTE — OUTREACH NOTE
Stroke Week 1 Survey      Responses   [facility] patient discharged from?  [place]   Does the patient have one of the following disease processes/diagnoses(primary or secondary)?  Stroke (TIA)   Week 1 attempt successful?  No   Unsuccessful attempts  Attempt 1          Cara Swann RN

## 2021-06-02 ENCOUNTER — TELEPHONE (OUTPATIENT)
Dept: CARDIOLOGY | Facility: CLINIC | Age: 66
End: 2021-06-02

## 2021-06-02 ENCOUNTER — OFFICE VISIT (OUTPATIENT)
Dept: CARDIOLOGY | Facility: CLINIC | Age: 66
End: 2021-06-02

## 2021-06-02 VITALS
HEART RATE: 55 BPM | SYSTOLIC BLOOD PRESSURE: 115 MMHG | DIASTOLIC BLOOD PRESSURE: 70 MMHG | BODY MASS INDEX: 22 KG/M2 | HEIGHT: 72 IN | WEIGHT: 162.4 LBS

## 2021-06-02 DIAGNOSIS — Z79.4 TYPE 2 DIABETES MELLITUS WITH MODERATE NONPROLIFERATIVE RETINOPATHY WITHOUT MACULAR EDEMA, WITH LONG-TERM CURRENT USE OF INSULIN, UNSPECIFIED LATERALITY (HCC): ICD-10-CM

## 2021-06-02 DIAGNOSIS — I50.32 CHRONIC DIASTOLIC HEART FAILURE (HCC): ICD-10-CM

## 2021-06-02 DIAGNOSIS — N18.30 ANEMIA, CHRONIC RENAL FAILURE, STAGE 3 (MODERATE) (HCC): ICD-10-CM

## 2021-06-02 DIAGNOSIS — Z99.89 OSA ON CPAP: ICD-10-CM

## 2021-06-02 DIAGNOSIS — G47.33 OSA ON CPAP: ICD-10-CM

## 2021-06-02 DIAGNOSIS — I10 ESSENTIAL HYPERTENSION: ICD-10-CM

## 2021-06-02 DIAGNOSIS — G47.34 SLEEP RELATED HYPOXIA: ICD-10-CM

## 2021-06-02 DIAGNOSIS — G47.33 OSA (OBSTRUCTIVE SLEEP APNEA): ICD-10-CM

## 2021-06-02 DIAGNOSIS — I25.10 CORONARY ARTERY DISEASE INVOLVING NATIVE CORONARY ARTERY OF NATIVE HEART WITHOUT ANGINA PECTORIS: Primary | ICD-10-CM

## 2021-06-02 DIAGNOSIS — Z51.81 ENCOUNTER FOR THERAPEUTIC DRUG LEVEL MONITORING: ICD-10-CM

## 2021-06-02 DIAGNOSIS — E11.3399 TYPE 2 DIABETES MELLITUS WITH MODERATE NONPROLIFERATIVE RETINOPATHY WITHOUT MACULAR EDEMA, WITH LONG-TERM CURRENT USE OF INSULIN, UNSPECIFIED LATERALITY (HCC): ICD-10-CM

## 2021-06-02 DIAGNOSIS — D63.1 ANEMIA, CHRONIC RENAL FAILURE, STAGE 3 (MODERATE) (HCC): ICD-10-CM

## 2021-06-02 DIAGNOSIS — I63.9 RECURRENT STROKES (HCC): ICD-10-CM

## 2021-06-02 DIAGNOSIS — E78.49 OTHER HYPERLIPIDEMIA: ICD-10-CM

## 2021-06-02 PROCEDURE — 99214 OFFICE O/P EST MOD 30 MIN: CPT | Performed by: NURSE PRACTITIONER

## 2021-06-02 PROCEDURE — 93000 ELECTROCARDIOGRAM COMPLETE: CPT | Performed by: NURSE PRACTITIONER

## 2021-06-02 NOTE — PROGRESS NOTES
Date of Office Visit: 21  Encounter Provider: JI Triplett  Place of Service: [facility] CARDIOLOGY  Patient Name: Carlito Mo  :1955    Chief Complaint   Patient presents with   • Coronary artery disease involving native coronary artery of    • Chronic combined systolic and diastolic congestive heart haylie   • Edema   • Dizziness   • Shortness of Breath   • Follow-up   :     HPI: Carlito Mo is a 66 y.o. male  with history of CAD with MI and CABG in , HTN, HLD, type 2 diabetes, anemia, chronic kidney disease, Hx CVAs in 2017, and basel cell carcinoma.  His ECHO in Decemeber 2017 [id number] mild LV hypertrophy, mild left atrial enlargement with negative bubble, normal LV systolic fx, and EF of 68%.  NEISHA ECHO in 2017 showed a lower EF of 45% with focal wall motion abnormalities primarliy in the septum and anterior wall, small patent foramen ovale, no significant valvular disease, or cardiac emboli source minus the small PFO. 24 hour Holter monitor on 12-15-17 showed a couple short bursts of SVT, but no sustained arrhythmias.  72 hour monitor in 2018 after a    Hospital visit Showed predominant NSR, rare PACs, 9 episodes of SVT peaking at 169, rare PVCs, and no VT.  A week later another ECHO calculated EF of 62.9 and normal LV function, grade 1 dysfunction, and mild MVR.    Came back to the hospital in May 2018 with acute mental status changes and hypertensive issues, and chronically elevated troponins without EKG changes.    Hospitalized again in  after a hypoxic episode s/p colonoscopy.  During this admission he did have another ECHO showing LV dysfunction and EF changes, they have decreased since prior.  There was now grade 2 dysfunction and mildly elevated pulmonary pressures with a left pleural effusion. He was treated for PNA, diuresed, and sent home on oxygen.      ECHO on 20 showed EF 60%, hypokinesis of the distal  anteroseptal, distal inferoseptal, and apex. Intermediate diastolic dysfunction.  Mild hypertrophic LV, normal RV fx, dilated left atrial cavity, and still no significant valvular disease.      Back into the hospital in June 2020 with weakness, acute on chronic kidney injury, and anemia. EGD showed gastritis, but otherwise no bleeding, and Iron started. He was also very hypotensive during this time and HTN meds were discontinued and placed on midodrine at that time. He has sense been put back on his lisinopril for HTN.     He was hospitalized May 2020 and diagnosed with new areas of cerebral infarction felt to be embolic.  Evidence of some chronic microhemorrhages.  Repeat ECHO 5-21 showed progressive LV fx decline with current EF of 31-35%, grade 3 LV dysfunction now, moderate to severe global hypokinesis.  Mildly dilated RV cavity, moderately reduced RV fx, milld MVR, small pericardial effusion (< 1cm), and left pleural effusion.  Carotid US as well showed Rt ICA 16-49% and Lt ICA 16-49% stenosis.  Cardiology evaluated.  He had chronically elevated troponin which was not felt to be acute.  He received IV Lasix for acute heart failure and pulmonary edema.  He was transitioned to oral diuretic and medications were adjusted for new cardiomyopathy.  He was started on spironolactone and hydralazine was stopped.  He was switched to carvedilol and lisinopril was increased.  Nephrology evaluated and felt his baseline creatinine was 2.3-2.6.  His creatinine was 2.00 at discharge with normal potassium.  He was discharged on Eliquis in addition to aspirin.      Patient last saw Dr. Murguia 10-27-20, this will be the first time seeing Mr. Mo for hospital discharge follow-up.. I have reviewed his chart.  On presentation patient was very quiet, yawning, fatigued, and withdrawn requiring several attempts to engage in conversation.  He states that he does occasionally feel fatigued, but is able to get 7 to 8 hours asleep at night  with no issues lying flat.  Trouble wearing his CPAP nightly.  He states that he is pretty stationary, does sitting exercises with physical therapy and some activity in the house, but wife has to encourage him for some to get out and about.  He is lacking routine exercise.  While at home he does experience the occasional baseline shortness of breath but with activity he denies change or intensity progression.  He did state that he had a fall last week in the bathroom but this was related to tripping thus mechanical and not related to lightheadedness or syncope.  He denies blacking out or syncopal events.  He does complain about the occasional lightheadedness, 2-3 times a week associated with standing or moving around, but this is self-limited and does not interfere with ADLs.  He denies chest pain, tightness, or palpitations.  No issues with medication changes from hospital visit.  Still taking Eliquis twice a day for stroke protection.  Denies bruising or bleeding, but does not check his stool.  Encouraged to do so and report any change.  Tolerating increase Lipitor dose, denies muscle aches.  Denies issues with new Lasix and spironolactone dose, lower extremity edema minimal/trace, and he states much better than before.  Weight is down 20 pounds as well.  He denies dietary concerns or loss of appetite.        Allergies   Allergen Reactions   • Prozac [Fluoxetine Hcl] Other (See Comments)     shaking           Family and social history reviewed.     Review of Systems   Constitutional: Positive for malaise/fatigue and weight loss. Negative for chills, decreased appetite, diaphoresis and night sweats.   Cardiovascular: Positive for leg swelling. Negative for chest pain, dyspnea on exertion, irregular heartbeat, orthopnea and palpitations.   Respiratory: Positive for shortness of breath, sleep disturbances due to breathing and snoring. Negative for cough, hemoptysis, sputum production and wheezing.    Neurological:  "Positive for focal weakness and light-headedness. Negative for dizziness, loss of balance and vertigo.     All other systems were reviewed and are negative          Objective:     Vitals:    06/02/21 1111   BP: 115/70   BP Location: Left arm   Patient Position: Sitting   Pulse: 55   Weight: 73.7 kg (162 lb 6.4 oz)   Height: 182.9 cm (72\")     Body mass index is 22.03 kg/m².    PHYSICAL EXAM:  Vitals and nursing note reviewed.   Constitutional:       Appearance: Not in distress. Frail.   Pulmonary:      Effort: Pulmonary effort is normal.      Breath sounds: Decreased air movement present. Examination of the right-lower field reveals decreased breath sounds. Examination of the left-lower field reveals decreased breath sounds. Decreased breath sounds present. No wheezing. No rhonchi. No rales.   Chest:      Chest wall: Not tender to palpatation. Not reproducing clinical pain.   Cardiovascular:      PMI at left midclavicular line. Bradycardia present. Regular rhythm.      Murmurs: There is no murmur.   Pulses:     Intact distal pulses.   Edema:     Ankle: bilateral trace edema of the ankle.     Feet: bilateral trace edema of the feet.  Neurological:      Mental Status: Alert and oriented to person, place and time.           ECG 12 Lead    Date/Time: 6/2/2021 12:02 PM  Performed by: Nida Lopes APRN  Authorized by: Nida Lopes APRN   Comparison: compared with previous ECG   Similar to previous ECG  Rhythm: sinus rhythm  Rate: bradycardic  Conduction: right bundle branch block and left anterior fascicular block  T inversion: V1 and V2  QRS axis: normal    Clinical impression: abnormal EKG  Comments: Similar to last month, no signs of ischemia              Current Outpatient Medications   Medication Sig Dispense Refill   • acetaminophen (TYLENOL) 325 MG tablet Take 2 tablets by mouth Every 4 (Four) Hours As Needed for Mild Pain  or Fever (Temperature greater than or equal to 37 C).     • apixaban (ELIQUIS) 5 MG " tablet tablet Take 1 tablet by mouth Every 12 (Twelve) Hours. Indications: Other - full anticoagulation 60 tablet 0   • aspirin 81 MG EC tablet Take 81 mg by mouth Daily.     • atorvastatin (LIPITOR) 80 MG tablet Take 1 tablet by mouth Every Night. 30 tablet 0   • carvedilol (COREG) 25 MG tablet Take 1 tablet by mouth 2 (Two) Times a Day With Meals. 60 tablet 0   • Cholecalciferol (VITAMIN D3) 5000 units capsule capsule Take 5,000 Units by mouth Daily.     • folic acid (FOLVITE) 1 MG tablet TAKE ONE TABLET BY MOUTH DAILY 205 tablet 0   • furosemide (LASIX) 40 MG tablet Take 1 tablet by mouth Daily.     • Insulin Glargine (BASAGLAR KWIKPEN) 100 UNIT/ML injection pen Inject 4 Units under the skin into the appropriate area as directed Every Night. Patient says he does not take consistently     • insulin glulisine (APIDRA) 100 UNIT/ML injection Inject 2 Units under the skin into the appropriate area as directed 3 (Three) Times a Day Before Meals. Patient says he does not take consistently     • lisinopril (PRINIVIL,ZESTRIL) 10 MG tablet Take 2 tablets by mouth Daily.     • melatonin 3 MG tablet Take 1 tablet by mouth Every Night. 30 tablet 0   • spironolactone (ALDACTONE) 25 MG tablet Take 1 tablet by mouth Daily. 30 tablet 0   • tamsulosin (FLOMAX) 0.4 MG capsule 24 hr capsule Take 1 capsule by mouth Every Night.     • vitamin B-12 (CYANOCOBALAMIN) 1000 MCG tablet TAKE ONE TABLET BY MOUTH DAILY 205 tablet 0     No current facility-administered medications for this visit.     Assessment:       Diagnosis Plan   1. Coronary artery disease involving native coronary artery of native heart without angina pectoris     2. Chronic diastolic heart failure (CMS/HCC)     3. Essential hypertension     4. Recurrent strokes (CMS/HCC)     5. Sleep related hypoxia     6. YAW (obstructive sleep apnea)     7. Other hyperlipidemia     8. YAW on CPAP     9. Type 2 diabetes mellitus with moderate nonproliferative retinopathy without macular  edema, with long-term current use of insulin, unspecified laterality (CMS/HCC)     10. Anemia, chronic renal failure, stage 3 (moderate) (CMS/HCC)     11. Encounter for therapeutic drug level monitoring  Basic Metabolic Panel        Orders Placed This Encounter   Procedures   • Basic Metabolic Panel     Standing Status:   Future     Standing Expiration Date:   6/2/2022     Order Specific Question:   Release to patient     Answer:   Immediate   • ECG 12 Lead     This order was created via procedure documentation     Order Specific Question:   Release to patient     Answer:   Immediate         Plan:   1.  66 y.o. male  with history of CAD with MI and CABG in 2001, HTN, HLD, type 2 diabetes, anemia, chronic kidney disease, Hx CVAs in 2017, and basel cell carcinoma. Multiple hospital visits over the last few years, progressive health decline with ECHO last month showing a grade 3 dysfunction and reduced EF down to 31-35%. Recent Carotid US showed mild to moderate stenosis both ICA.   - on GDMT with carvedilol, lisinopril, furosemide, & spironolactone. We will check renal function   2. CAD -continue baby aspirin daily and carvedilol 25 mg daily.  3. CHF -no verbalized issues on new furosemide and spironolactone dose.  Weight is down 20 pounds without dietary changes.  Lower extremity edema is minimal and lungs clear, so continue current dose.  We will check kidney function with home health services.  Previous creatinine was 2.0 last month prior to medication changes.  4. HTN -blood pressure at goal, stable at home currently being monitored by home health and he does not check otherwise as often as he should.  Encouraging him to check daily.  No change.   5. YAW -states he does have a machine, but does not wear it like he supposed to.  But room for improvement, and educated about necessity.  6. Hx of CVA's to be maintained on Eliquis 5 mg twice daily and 81 mg aspirin daily.  No mental status changes since hospital visit  last month.  No signs of bruising or blood loss.  7. HLD now on maximum intensity atorvastatin, atorvastatin 80 mg daily.  Denies muscle aches and pains.  8. T2DM -does not check his blood sugars like he is supposed to.  He states he might check it once every 2 to 3 days.  He said 2 days ago blood sugar was 141.  Asked to check at least daily in the a.m. on empty stomach.  9. Anemia -on vitamin B12 and folic acid daily.  Hemoglobin on June 21 was 10.6.  10.  Fall-suffered a mechanical fall last week, not related to lightheadedness or syncope.            Follow up in 6 weeks.             It has been a pleasure to participate in this patient's care.      Thank you,  JI Triplett      **I used Dragon to dictate this note:**

## 2021-06-02 NOTE — TELEPHONE ENCOUNTER
Have called Titus Gutierrez at [phone] and left a very detailed msg with pt's demographics including phone number and lab orders for Friday.    Yolande could you follow up on this tomorrow please as I will be out.     Thanks.

## 2021-06-02 NOTE — TELEPHONE ENCOUNTER
Please contact [facility] health intake to arrange for BMP to be drawn visit on Friday. Order is in epic

## 2021-06-03 NOTE — TELEPHONE ENCOUNTER
I called [facility] to be certain they had the orders.  They will go out Friday to draw BMP and fax the results to us. / LION

## 2021-06-03 NOTE — TELEPHONE ENCOUNTER
I called and left a message with [facility] Intake to call and let me know they have received our message to draw a BMP tomorrow. / LION

## 2021-06-04 ENCOUNTER — LAB REQUISITION (OUTPATIENT)
Dept: LAB | Facility: HOSPITAL | Age: 66
End: 2021-06-04

## 2021-06-04 DIAGNOSIS — Z00.00 ENCOUNTER FOR GENERAL ADULT MEDICAL EXAMINATION WITHOUT ABNORMAL FINDINGS: ICD-10-CM

## 2021-06-04 LAB
ANION GAP SERPL CALCULATED.3IONS-SCNC: 9.5 MMOL/L (ref 5–15)
BUN SERPL-MCNC: 45 MG/DL (ref 8–23)
BUN/CREAT SERPL: 23.9 (ref 7–25)
CALCIUM SPEC-SCNC: 9.1 MG/DL (ref 8.6–10.5)
CHLORIDE SERPL-SCNC: 106 MMOL/L (ref 98–107)
CO2 SERPL-SCNC: 24.5 MMOL/L (ref 22–29)
CREAT SERPL-MCNC: 1.88 MG/DL (ref 0.76–1.27)
GFR SERPL CREATININE-BSD FRML MDRD: 36 ML/MIN/1.73
GLUCOSE SERPL-MCNC: 140 MG/DL (ref 65–99)
POTASSIUM SERPL-SCNC: 4.5 MMOL/L (ref 3.5–5.2)
SODIUM SERPL-SCNC: 140 MMOL/L (ref 136–145)

## 2021-06-04 PROCEDURE — 80048 BASIC METABOLIC PNL TOTAL CA: CPT | Performed by: NURSE PRACTITIONER

## 2021-06-04 NOTE — TELEPHONE ENCOUNTER
Nida Lopes out of the office this week.    Please let patient know that creatinine has some improvement though is still elevated.      Does he follow with a nephrologist?  Please also make sure he is not taking any NSAIDs such as Advil, ibuprofen, naproxen, Aleve.

## 2021-06-09 NOTE — TELEPHONE ENCOUNTER
Spoke with patient.  He is seeing Dr. Huddleston, Nephrology.  He does not take Ibuprofen, Aleve or any NSAIDS. / LION

## 2021-06-09 NOTE — TELEPHONE ENCOUNTER
Okay, please fax copy of labs to Dr. Huddleston and make sure he continues to follow with him to ensure okay to continue current dose of diuretics.

## 2021-06-10 ENCOUNTER — TELEPHONE (OUTPATIENT)
Dept: CARDIOLOGY | Facility: CLINIC | Age: 66
End: 2021-06-10

## 2021-06-10 NOTE — TELEPHONE ENCOUNTER
----- Message from JI Triplett sent at 6/10/2021  8:20 AM EDT -----  Reviewed labs and recommend No changes. Would continue current regimen sine renal function is stable. Please advise him to continue current dosages.

## 2021-06-11 NOTE — TELEPHONE ENCOUNTER
Faxed copy of lab results to Dr. Huddleston.  Fax# [phone].  Faxed confirmation received.      I called and left a message for patient to contact Dr. Huddleston to ensure OKAY to continue current dose of diuretics./ LION

## 2021-06-24 ENCOUNTER — TELEPHONE (OUTPATIENT)
Dept: NEUROLOGY | Facility: CLINIC | Age: 66
End: 2021-06-24

## 2021-07-12 ENCOUNTER — TELEPHONE (OUTPATIENT)
Dept: ONCOLOGY | Facility: CLINIC | Age: 66
End: 2021-07-12

## 2021-07-12 NOTE — TELEPHONE ENCOUNTER
Caller: Carlito Mo    Relationship to patient: Self    Best call back number: [phone]    Chief complaint: PT NEEDS TO RESCHEDULE     Type of visit:  FOLLOW UP    Requested date: AFTERNOON    If rescheduling, when is the original appointment: 07/07    Additional notes:

## 2021-07-27 ENCOUNTER — OFFICE VISIT (OUTPATIENT)
Dept: ONCOLOGY | Facility: CLINIC | Age: 66
End: 2021-07-27

## 2021-07-27 ENCOUNTER — LAB (OUTPATIENT)
Dept: LAB | Facility: HOSPITAL | Age: 66
End: 2021-07-27

## 2021-07-27 VITALS
TEMPERATURE: 98.2 F | SYSTOLIC BLOOD PRESSURE: 154 MMHG | HEART RATE: 71 BPM | WEIGHT: 171.1 LBS | RESPIRATION RATE: 16 BRPM | BODY MASS INDEX: 23.17 KG/M2 | HEIGHT: 72 IN | DIASTOLIC BLOOD PRESSURE: 71 MMHG | OXYGEN SATURATION: 97 %

## 2021-07-27 DIAGNOSIS — D63.1 ANEMIA, CHRONIC RENAL FAILURE, STAGE 4 (SEVERE) (HCC): Primary | ICD-10-CM

## 2021-07-27 DIAGNOSIS — N18.30 ANEMIA, CHRONIC RENAL FAILURE, STAGE 3 (MODERATE) (HCC): ICD-10-CM

## 2021-07-27 DIAGNOSIS — N18.4 ANEMIA, CHRONIC RENAL FAILURE, STAGE 4 (SEVERE) (HCC): Primary | ICD-10-CM

## 2021-07-27 DIAGNOSIS — D63.1 ANEMIA, CHRONIC RENAL FAILURE, STAGE 3 (MODERATE) (HCC): ICD-10-CM

## 2021-07-27 LAB
ALBUMIN SERPL-MCNC: 3.4 G/DL (ref 3.5–5.2)
ALBUMIN/GLOB SERPL: 1.3 G/DL (ref 1.1–2.4)
ALP SERPL-CCNC: 89 U/L (ref 38–116)
ALT SERPL W P-5'-P-CCNC: 15 U/L (ref 0–41)
ANION GAP SERPL CALCULATED.3IONS-SCNC: 8.9 MMOL/L (ref 5–15)
AST SERPL-CCNC: 15 U/L (ref 0–40)
BASOPHILS # BLD AUTO: 0.03 10*3/MM3 (ref 0–0.2)
BASOPHILS NFR BLD AUTO: 0.5 % (ref 0–1.5)
BILIRUB SERPL-MCNC: 0.2 MG/DL (ref 0.2–1.2)
BUN SERPL-MCNC: 36 MG/DL (ref 6–20)
BUN/CREAT SERPL: 15.2 (ref 7.3–30)
CALCIUM SPEC-SCNC: 8.8 MG/DL (ref 8.5–10.2)
CHLORIDE SERPL-SCNC: 103 MMOL/L (ref 98–107)
CO2 SERPL-SCNC: 26.1 MMOL/L (ref 22–29)
CREAT SERPL-MCNC: 2.37 MG/DL (ref 0.7–1.3)
DEPRECATED RDW RBC AUTO: 43.7 FL (ref 37–54)
EOSINOPHIL # BLD AUTO: 0.21 10*3/MM3 (ref 0–0.4)
EOSINOPHIL NFR BLD AUTO: 3.7 % (ref 0.3–6.2)
ERYTHROCYTE [DISTWIDTH] IN BLOOD BY AUTOMATED COUNT: 13.9 % (ref 12.3–15.4)
FERRITIN SERPL-MCNC: 455.8 NG/ML (ref 30–400)
GFR SERPL CREATININE-BSD FRML MDRD: 28 ML/MIN/1.73
GLOBULIN UR ELPH-MCNC: 2.6 GM/DL (ref 1.8–3.5)
GLUCOSE SERPL-MCNC: 225 MG/DL (ref 74–124)
HCT VFR BLD AUTO: 33.5 % (ref 37.5–51)
HGB BLD-MCNC: 10.4 G/DL (ref 13–17.7)
IMM GRANULOCYTES # BLD AUTO: 0.03 10*3/MM3 (ref 0–0.05)
IMM GRANULOCYTES NFR BLD AUTO: 0.5 % (ref 0–0.5)
IRON 24H UR-MRATE: 62 MCG/DL (ref 59–158)
IRON SATN MFR SERPL: 23 % (ref 14–48)
LYMPHOCYTES # BLD AUTO: 1.15 10*3/MM3 (ref 0.7–3.1)
LYMPHOCYTES NFR BLD AUTO: 20 % (ref 19.6–45.3)
MCH RBC QN AUTO: 26.4 PG (ref 26.6–33)
MCHC RBC AUTO-ENTMCNC: 31 G/DL (ref 31.5–35.7)
MCV RBC AUTO: 85 FL (ref 79–97)
MONOCYTES # BLD AUTO: 0.52 10*3/MM3 (ref 0.1–0.9)
MONOCYTES NFR BLD AUTO: 9.1 % (ref 5–12)
NEUTROPHILS NFR BLD AUTO: 3.8 10*3/MM3 (ref 1.7–7)
NEUTROPHILS NFR BLD AUTO: 66.2 % (ref 42.7–76)
NRBC BLD AUTO-RTO: 0 /100 WBC (ref 0–0.2)
PLATELET # BLD AUTO: 221 10*3/MM3 (ref 140–450)
PMV BLD AUTO: 9.7 FL (ref 6–12)
POTASSIUM SERPL-SCNC: 4.4 MMOL/L (ref 3.5–4.7)
PROT SERPL-MCNC: 6 G/DL (ref 6.3–8)
RBC # BLD AUTO: 3.94 10*6/MM3 (ref 4.14–5.8)
SODIUM SERPL-SCNC: 138 MMOL/L (ref 134–145)
TIBC SERPL-MCNC: 267 MCG/DL (ref 249–505)
TRANSFERRIN SERPL-MCNC: 191 MG/DL (ref 200–360)
WBC # BLD AUTO: 5.74 10*3/MM3 (ref 3.4–10.8)

## 2021-07-27 PROCEDURE — 36415 COLL VENOUS BLD VENIPUNCTURE: CPT

## 2021-07-27 PROCEDURE — 99213 OFFICE O/P EST LOW 20 MIN: CPT | Performed by: INTERNAL MEDICINE

## 2021-07-27 PROCEDURE — 80053 COMPREHEN METABOLIC PANEL: CPT

## 2021-07-27 PROCEDURE — 82728 ASSAY OF FERRITIN: CPT

## 2021-07-27 PROCEDURE — 83540 ASSAY OF IRON: CPT

## 2021-07-27 PROCEDURE — 84466 ASSAY OF TRANSFERRIN: CPT

## 2021-07-27 PROCEDURE — 85025 COMPLETE CBC W/AUTO DIFF WBC: CPT

## 2021-07-27 RX ORDER — FERROUS SULFATE 325(65) MG
325 TABLET ORAL 3 TIMES DAILY
COMMUNITY
Start: 2021-06-02 | End: 2021-07-27 | Stop reason: SDUPTHER

## 2021-07-27 RX ORDER — BUPROPION HYDROCHLORIDE 150 MG/1
TABLET ORAL
COMMUNITY
Start: 2021-06-08 | End: 2022-03-08 | Stop reason: HOSPADM

## 2021-07-27 RX ORDER — PSEUDOEPHEDRINE HCL 30 MG
100 TABLET ORAL
Status: ON HOLD | COMMUNITY
Start: 2021-06-02 | End: 2022-02-01

## 2021-07-27 RX ORDER — FERROUS SULFATE 325(65) MG
325 TABLET ORAL
Qty: 180 TABLET | Refills: 3 | Status: SHIPPED | OUTPATIENT
Start: 2021-07-27 | End: 2022-02-04 | Stop reason: HOSPADM

## 2021-07-27 RX ORDER — LEVOTHYROXINE SODIUM 0.07 MG/1
TABLET ORAL
COMMUNITY
Start: 2021-06-08 | End: 2022-02-08 | Stop reason: SDUPTHER

## 2021-07-27 RX ORDER — POTASSIUM CHLORIDE 750 MG/1
TABLET, FILM COATED, EXTENDED RELEASE ORAL
COMMUNITY
End: 2021-12-14

## 2021-09-01 ENCOUNTER — OFFICE VISIT (OUTPATIENT)
Dept: CARDIOLOGY | Facility: CLINIC | Age: 66
End: 2021-09-01

## 2021-09-01 VITALS
WEIGHT: 177 LBS | HEIGHT: 72 IN | BODY MASS INDEX: 23.98 KG/M2 | HEART RATE: 70 BPM | DIASTOLIC BLOOD PRESSURE: 72 MMHG | SYSTOLIC BLOOD PRESSURE: 154 MMHG

## 2021-09-01 DIAGNOSIS — I50.43 ACUTE ON CHRONIC COMBINED SYSTOLIC AND DIASTOLIC CONGESTIVE HEART FAILURE (HCC): Primary | ICD-10-CM

## 2021-09-01 DIAGNOSIS — Q21.12 PATENT FORAMEN OVALE: ICD-10-CM

## 2021-09-01 DIAGNOSIS — E78.49 OTHER HYPERLIPIDEMIA: ICD-10-CM

## 2021-09-01 DIAGNOSIS — I25.10 CORONARY ARTERY DISEASE INVOLVING NATIVE CORONARY ARTERY OF NATIVE HEART WITHOUT ANGINA PECTORIS: ICD-10-CM

## 2021-09-01 PROCEDURE — 99214 OFFICE O/P EST MOD 30 MIN: CPT | Performed by: INTERNAL MEDICINE

## 2021-09-01 PROCEDURE — 93000 ELECTROCARDIOGRAM COMPLETE: CPT | Performed by: INTERNAL MEDICINE

## 2021-09-01 NOTE — PROGRESS NOTES
CARDIOLOGY    Amber Murguia MD    ENCOUNTER DATE:  09/01/2021    Carlito Mo / 66 y.o. / male        CHIEF COMPLAINT / REASON FOR OFFICE VISIT     Follow-up (6 week), Coronary Artery Disease, and Congestive Heart Failure      HISTORY OF PRESENT ILLNESS       HPI    Carlito Mo is a 66 y.o. male     Mr. Carlito Mo is a male patient with a history of CAD/MI (CABG 2001), HTN, hyperlipidemia, type 2 diabetes mellitus, anemia, chronic kidney disease, and basal cell carcinoma.  I first saw him in November 2017 when he was admitted with altered mental status.  He was diagnosed with multiple small acute infarcts.  His echocardiogram from December 2017 showed mild left ventricular hypertrophy, mild left atrial enlargement, negative bubble study, normal LV systolic function with an ejection fraction of 68%.  Transesophageal echo in December 2017 showed that his LV function was mildly low with an ejection fraction of 45% with focal wall motion abnormalities primarily in the septum and anterior wall and apex.  Small patent foramen ovale was noted there was no significant valvular heart disease or cardiac source of embolus minus the small PFO.  Holter monitor showed a couple of short bursts of SVT but no sustained arrhythmia.      He came back to the hospital in February 2017 again with altered mental status.  At this time he had an echocardiogram which shows ejection fraction was 63%, grade 1 diastolic dysfunction and no significant valvular heart disease.  He wore a Zio patch which showed PACs, PVCs and nonsustained SVT.     He was back in the hospital in May 2018 with acute mental status changes and a blood pressure of 212/109.  He was noted at that time to have a chronically elevated troponin without acute EKG changes.     Sadly he was hospitalized again in 09/2018 after he was hypoxic and persistently lethargic after a colonoscopy.  During that admission he did have an echocardiogram which revealed his LV  function had decreased and his ejection fraction was now 46%.  There was grade II diastolic dysfunction and mildly elevated pulmonary pressures with a left pleural effusion.  He was treated for pneumonia.  He was also diuresed and seemed to be back to his baseline although he was discharged with 2 liters of oxygen at night.       Echo 5/27/20  · Left ventricular systolic function is normal. Calculated EF = 60%. There is hypokinesis of the distal anteroseptal, distal inferoseptal and the apex. Diastolic function is indeterminate.  · There is mild concentric hypertrophy of the left ventricle.  · Normal right ventricular cavity size, wall thickness, systolic function  · Left atrial cavity size is mildly dilated.  · No significant valvular abnormality noted.  · Compared to prior study on 12/2/2018 left ventricular ejection fraction has improved.     He was hospitalized in June 2020 with progressive weakness and acute kidney injury with anemia.  He required some fluid resuscitation.  EGD showed  non-showed nonbleeding gastritis he had pronounced orthostatic hypotension and his medications were discontinued.  He followed up with [facility] in June 2020 he was not having any bleeding at that time.  He was orthostatic in the office at that visit he was on Midrin.  He is off all hypertensive medications.  He has been following with Dr. Louie for gastritis and esophagitis and melena.  This is better.  He is on iron.     Echocardiogram May 2021:  · There is moderate to severe global hypokinesis  · Left ventricular ejection fraction appears to be 31 - 35%.  · Left ventricular diastolic function is consistent with (grade III w/high LAP) fixed restrictive pattern  · The right ventricular cavity is mildly dilated. Moderately reduced right ventricular systolic function noted.  · The left atrial cavity is moderately dilated.  · Mild mitral valve regurgitation is present  · There is a small (<1cm) pericardial effusion.  · There is a  "large left pleural effusion.       He diuresed but has had some weight gain.  Has had some lower extremity edema.  He thinks his breathing is at baseline.  He has not been compliant with medications.      REVIEW OF SYSTEMS     Review of Systems   Constitutional: Negative for chills, fever, weight gain and weight loss.   Cardiovascular: Negative for leg swelling.   Respiratory: Negative for cough, snoring and wheezing.    Hematologic/Lymphatic: Negative for bleeding problem. Does not bruise/bleed easily.   Skin: Negative for color change.   Musculoskeletal: Negative for falls, joint pain and myalgias.   Gastrointestinal: Negative for melena.   Genitourinary: Negative for hematuria.   Neurological: Negative for excessive daytime sleepiness.   Psychiatric/Behavioral: Negative for depression. The patient is not nervous/anxious.          VITAL SIGNS     Visit Vitals  /72 (BP Location: Right arm)   Pulse 70   Ht 182.9 cm (72\")   Wt 80.3 kg (177 lb)   BMI 24.01 kg/m²         Wt Readings from Last 3 Encounters:   09/01/21 80.3 kg (177 lb)   07/27/21 77.6 kg (171 lb 1.6 oz)   06/02/21 73.7 kg (162 lb 6.4 oz)     Body mass index is 24.01 kg/m².      PHYSICAL EXAMINATION     Constitutional:       General: Not in acute distress.  Neck:      Vascular: No carotid bruit or JVD.   Pulmonary:      Effort: Pulmonary effort is normal.      Breath sounds: Normal breath sounds.   Cardiovascular:      Normal rate. Regular rhythm.      Murmurs: There is no murmur.   Psychiatric:         Mood and Affect: Mood and affect normal.           REVIEWED DATA       ECG 12 Lead    Date/Time: 9/1/2021 2:47 PM  Performed by: Amber Murguia MD  Authorized by: Amber Murguia MD   Comparison: compared with previous ECG from 6/2/2021  Similar to previous ECG  Rhythm: sinus rhythm  BPM: 53  Conduction: conduction normal  ST Segments: ST segments normal  T Waves: T waves normal    Clinical impression: normal ECG              Lipid Panel  "   Lipid Panel 5/21/21   Total Cholesterol 166   Triglycerides 61   HDL Cholesterol 46   VLDL Cholesterol 12   LDL Cholesterol  108 (A)   LDL/HDL Ratio 2.34   (A) Abnormal value              Lab Results   Component Value Date    GLUCOSE 225 (H) 07/27/2021    BUN 36 (H) 07/27/2021    CREATININE 2.37 (C) 07/27/2021    EGFRIFNONA 28 (L) 07/27/2021    EGFRIFAFRI 74 12/12/2017    BCR 15.2 07/27/2021    K 4.4 07/27/2021    CO2 26.1 07/27/2021    CALCIUM 8.8 07/27/2021    PROTENTOTREF 6.7 12/12/2017    ALBUMIN 3.40 (L) 07/27/2021    LABIL2 1.5 11/20/2019    AST 15 07/27/2021    ALT 15 07/27/2021       ASSESSMENT & PLAN      Diagnosis Plan   1. Acute on chronic combined systolic and diastolic congestive heart failure (CMS/HCC)     2. Coronary artery disease involving native coronary artery of native heart without angina pectoris     3. Other hyperlipidemia     4. Patent foramen ovale         1.  Chronic systolic and diastolic heart failure.  He has back on lisinopril and Toprol-XL.    Encouraged him to be compliant with his medications including his diuretics..  2.  Coronary artery disease.  He had a bypass surgery in 2001.  He is off aspirin due to melena.  3.  History of stroke.  Transesophageal echocardiogram did not show cardiac source of embolus.  Zio patch did not show atrial fibrillation.  His neurologist recommended anticoagulation since he had repeat strokes on aspirin and Plavix.  He was on warfarin but that has been discontinued due to his GI bleeding.  4.  Hypertension.  His blood pressure is up a little bit.  5.  Hyperlipidemia.  Atorvastatin.  His lipids are followed by his primary doctor.  6.  Chronic kidney disease. He follows with Dr. Huddleston  7.  Right bundle branch block  8.  Chronically elevated troponin  9.    Gastritis.  Followed by Dr. Louie.  10.  Diabetes on insulin  11.  Obstructive sleep apnea on CPAP  12.  Orthostatic hypotension likely secondary to neuropathy from diabetes.     Follow-up with  Nida in 3 months      Orders Placed This Encounter   Procedures   • ECG 12 Lead     This order was created via procedure documentation     Order Specific Question:   Release to patient     Answer:   Immediate           MEDICATIONS         Discharge Medications          Accurate as of September 1, 2021  2:49 PM. If you have any questions, ask your nurse or doctor.            Continue These Medications      Instructions Start Date   acetaminophen 325 MG tablet  Commonly known as: TYLENOL   650 mg, Oral, Every 4 Hours PRN      Apidra 100 UNIT/ML injection  Generic drug: insulin glulisine   2 Units, Subcutaneous, 3 Times Daily Before Meals, Patient says he does not take consistently      aspirin 81 MG EC tablet   81 mg, Oral, Daily      atorvastatin 80 MG tablet  Commonly known as: LIPITOR   80 mg, Oral, Nightly      BASAGLAR KWIKPEN 100 UNIT/ML injection pen   4 Units, Subcutaneous, Nightly, Patient says he does not take consistently      buPROPion  MG 24 hr tablet  Commonly known as: WELLBUTRIN XL   TAKE ONE TABLET BY MOUTH EVERY MORNING      carvedilol 25 MG tablet  Commonly known as: COREG   25 mg, Oral, 2 Times Daily With Meals      docusate sodium 100 MG capsule   100 mg, Oral      Eliquis 5 MG tablet tablet  Generic drug: apixaban   5 mg, Oral, Every 12 Hours Scheduled      ferrous sulfate 325 (65 FE) MG tablet   325 mg, Oral, Daily With Breakfast & Dinner      folic acid 1 MG tablet  Commonly known as: FOLVITE   TAKE ONE TABLET BY MOUTH DAILY      furosemide 40 MG tablet  Commonly known as: LASIX   40 mg, Oral, Daily      levothyroxine 75 MCG tablet  Commonly known as: SYNTHROID, LEVOTHROID   TAKE ONE TABLET BY MOUTH DAILY      lisinopril 10 MG tablet  Commonly known as: PRINIVIL,ZESTRIL   20 mg, Oral, Daily      melatonin 3 MG tablet   3 mg, Oral, Nightly      potassium chloride 10 MEQ CR tablet   potassium chloride ER 10 mEq tablet,extended release      spironolactone 25 MG tablet  Commonly known as:  ALDACTONE   25 mg, Oral, Daily      tamsulosin 0.4 MG capsule 24 hr capsule  Commonly known as: FLOMAX   1 capsule, Oral, Nightly      vitamin B-12 1000 MCG tablet  Commonly known as: CYANOCOBALAMIN   TAKE ONE TABLET BY MOUTH DAILY      vitamin D3 125 MCG (5000 UT) capsule capsule   5,000 Units, Oral, Daily               Amber Murguia MD  09/01/21  14:49 EDT    **Dragon Disclaimer:   Much of this encounter note is an electronic transcription/translation of spoken language to printed text. The electronic translation of spoken language may permit erroneous, or at times, nonsensical words or phrases to be inadvertently transcribed. Although I have reviewed the note for such errors, some may still exist.

## 2021-09-26 NOTE — PROGRESS NOTES
Case Management Discharge Note      Final Note: Home with [facility], family and walker and [employer] stockings from [place].     Provided Post Acute Provider List?: Yes  Post Acute Provider List: Home Health  Delivered To: Patient, Support Person  Support Person: Gave verbal instructions for CMS Compare Web site  Method of Delivery: Telephone    Destination      No service has been selected for the patient.      Durable Medical Equipment      No service has been selected for the patient.      Dialysis/Infusion      No service has been selected for the patient.      Home Medical Care - Selection Complete      Service Provider Request Status Selected Services Address Phone Number Fax Number    [facility] [place] Selected Home Health Services [address] [address] [phone]      Therapy      No service has been selected for the patient.      Community Resources      No service has been selected for the patient.        Transportation Services  Private: Car    Final Discharge Disposition Code: 06 - home with home health care   98.3

## 2021-09-29 ENCOUNTER — OFFICE VISIT (OUTPATIENT)
Dept: NEUROLOGY | Facility: CLINIC | Age: 66
End: 2021-09-29

## 2021-09-29 VITALS
HEART RATE: 72 BPM | SYSTOLIC BLOOD PRESSURE: 130 MMHG | DIASTOLIC BLOOD PRESSURE: 90 MMHG | OXYGEN SATURATION: 98 % | HEIGHT: 72 IN | RESPIRATION RATE: 16 BRPM | BODY MASS INDEX: 23.3 KG/M2 | WEIGHT: 172 LBS

## 2021-09-29 DIAGNOSIS — I25.10 CORONARY ARTERY DISEASE INVOLVING NATIVE CORONARY ARTERY OF NATIVE HEART WITHOUT ANGINA PECTORIS: ICD-10-CM

## 2021-09-29 DIAGNOSIS — G47.33 OSA (OBSTRUCTIVE SLEEP APNEA): ICD-10-CM

## 2021-09-29 DIAGNOSIS — I63.9 RECURRENT STROKES (HCC): Primary | ICD-10-CM

## 2021-09-29 DIAGNOSIS — I10 ESSENTIAL HYPERTENSION: ICD-10-CM

## 2021-09-29 DIAGNOSIS — Z91.81 AT RISK FOR FALLS: ICD-10-CM

## 2021-09-29 DIAGNOSIS — Q21.12 PATENT FORAMEN OVALE: ICD-10-CM

## 2021-09-29 PROCEDURE — 99215 OFFICE O/P EST HI 40 MIN: CPT | Performed by: NURSE PRACTITIONER

## 2021-12-13 ENCOUNTER — APPOINTMENT (OUTPATIENT)
Dept: GENERAL RADIOLOGY | Facility: HOSPITAL | Age: 66
End: 2021-12-13

## 2021-12-13 ENCOUNTER — APPOINTMENT (OUTPATIENT)
Dept: CT IMAGING | Facility: HOSPITAL | Age: 66
End: 2021-12-13

## 2021-12-13 ENCOUNTER — HOSPITAL ENCOUNTER (INPATIENT)
Facility: HOSPITAL | Age: 66
LOS: 8 days | Discharge: HOME | End: 2021-12-22
Attending: EMERGENCY MEDICINE | Admitting: STUDENT IN AN ORGANIZED HEALTH CARE EDUCATION/TRAINING PROGRAM

## 2021-12-13 DIAGNOSIS — N18.30 STAGE 3 CHRONIC KIDNEY DISEASE, UNSPECIFIED WHETHER STAGE 3A OR 3B CKD (HCC): ICD-10-CM

## 2021-12-13 DIAGNOSIS — R06.02 MILD SHORTNESS OF BREATH: ICD-10-CM

## 2021-12-13 DIAGNOSIS — R79.89 ELEVATED BRAIN NATRIURETIC PEPTIDE (BNP) LEVEL: ICD-10-CM

## 2021-12-13 DIAGNOSIS — R10.30 LOWER ABDOMINAL PAIN: Primary | ICD-10-CM

## 2021-12-13 DIAGNOSIS — J90 PLEURAL EFFUSION: ICD-10-CM

## 2021-12-13 LAB
ALBUMIN SERPL-MCNC: 3.1 G/DL (ref 3.5–5.2)
ALBUMIN/GLOB SERPL: 1.1 G/DL
ALP SERPL-CCNC: 86 U/L (ref 39–117)
ALT SERPL W P-5'-P-CCNC: 8 U/L (ref 1–41)
ANION GAP SERPL CALCULATED.3IONS-SCNC: 7.3 MMOL/L (ref 5–15)
AST SERPL-CCNC: 13 U/L (ref 1–40)
BACTERIA UR QL AUTO: ABNORMAL /HPF
BASOPHILS # BLD AUTO: 0.02 10*3/MM3 (ref 0–0.2)
BASOPHILS NFR BLD AUTO: 0.3 % (ref 0–1.5)
BILIRUB SERPL-MCNC: 0.2 MG/DL (ref 0–1.2)
BILIRUB UR QL STRIP: NEGATIVE
BUN SERPL-MCNC: 26 MG/DL (ref 8–23)
BUN/CREAT SERPL: 9.8 (ref 7–25)
CALCIUM SPEC-SCNC: 9 MG/DL (ref 8.6–10.5)
CHLORIDE SERPL-SCNC: 107 MMOL/L (ref 98–107)
CLARITY UR: CLEAR
CO2 SERPL-SCNC: 23.7 MMOL/L (ref 22–29)
COLOR UR: YELLOW
CREAT SERPL-MCNC: 2.64 MG/DL (ref 0.76–1.27)
DEPRECATED RDW RBC AUTO: 40.7 FL (ref 37–54)
EOSINOPHIL # BLD AUTO: 0.23 10*3/MM3 (ref 0–0.4)
EOSINOPHIL NFR BLD AUTO: 3.8 % (ref 0.3–6.2)
ERYTHROCYTE [DISTWIDTH] IN BLOOD BY AUTOMATED COUNT: 13.2 % (ref 12.3–15.4)
GFR SERPL CREATININE-BSD FRML MDRD: 24 ML/MIN/1.73
GLOBULIN UR ELPH-MCNC: 2.8 GM/DL
GLUCOSE SERPL-MCNC: 190 MG/DL (ref 65–99)
GLUCOSE UR STRIP-MCNC: ABNORMAL MG/DL
HCT VFR BLD AUTO: 37 % (ref 37.5–51)
HGB BLD-MCNC: 11.7 G/DL (ref 13–17.7)
HGB UR QL STRIP.AUTO: ABNORMAL
HOLD SPECIMEN: NORMAL
HOLD SPECIMEN: NORMAL
HYALINE CASTS UR QL AUTO: ABNORMAL /LPF
IMM GRANULOCYTES # BLD AUTO: 0.02 10*3/MM3 (ref 0–0.05)
IMM GRANULOCYTES NFR BLD AUTO: 0.3 % (ref 0–0.5)
KETONES UR QL STRIP: NEGATIVE
LEUKOCYTE ESTERASE UR QL STRIP.AUTO: NEGATIVE
LIPASE SERPL-CCNC: 33 U/L (ref 13–60)
LYMPHOCYTES # BLD AUTO: 0.97 10*3/MM3 (ref 0.7–3.1)
LYMPHOCYTES NFR BLD AUTO: 15.9 % (ref 19.6–45.3)
MCH RBC QN AUTO: 26.7 PG (ref 26.6–33)
MCHC RBC AUTO-ENTMCNC: 31.6 G/DL (ref 31.5–35.7)
MCV RBC AUTO: 84.3 FL (ref 79–97)
MONOCYTES # BLD AUTO: 0.44 10*3/MM3 (ref 0.1–0.9)
MONOCYTES NFR BLD AUTO: 7.2 % (ref 5–12)
NEUTROPHILS NFR BLD AUTO: 4.43 10*3/MM3 (ref 1.7–7)
NEUTROPHILS NFR BLD AUTO: 72.5 % (ref 42.7–76)
NITRITE UR QL STRIP: NEGATIVE
NRBC BLD AUTO-RTO: 0 /100 WBC (ref 0–0.2)
NT-PROBNP SERPL-MCNC: 7027 PG/ML (ref 0–900)
PH UR STRIP.AUTO: 5.5 [PH] (ref 5–8)
PLATELET # BLD AUTO: 283 10*3/MM3 (ref 140–450)
PMV BLD AUTO: 10.1 FL (ref 6–12)
POTASSIUM SERPL-SCNC: 4 MMOL/L (ref 3.5–5.2)
PROT SERPL-MCNC: 5.9 G/DL (ref 6–8.5)
PROT UR QL STRIP: ABNORMAL
RBC # BLD AUTO: 4.39 10*6/MM3 (ref 4.14–5.8)
RBC # UR STRIP: ABNORMAL /HPF
REF LAB TEST METHOD: ABNORMAL
SODIUM SERPL-SCNC: 138 MMOL/L (ref 136–145)
SP GR UR STRIP: 1.02 (ref 1–1.03)
SQUAMOUS #/AREA URNS HPF: ABNORMAL /HPF
UROBILINOGEN UR QL STRIP: ABNORMAL
WBC # UR STRIP: ABNORMAL /HPF
WBC NRBC COR # BLD: 6.11 10*3/MM3 (ref 3.4–10.8)
WHOLE BLOOD HOLD SPECIMEN: NORMAL
WHOLE BLOOD HOLD SPECIMEN: NORMAL

## 2021-12-13 PROCEDURE — 83690 ASSAY OF LIPASE: CPT

## 2021-12-13 PROCEDURE — 74176 CT ABD & PELVIS W/O CONTRAST: CPT

## 2021-12-13 PROCEDURE — 83880 ASSAY OF NATRIURETIC PEPTIDE: CPT | Performed by: EMERGENCY MEDICINE

## 2021-12-13 PROCEDURE — 36415 COLL VENOUS BLD VENIPUNCTURE: CPT

## 2021-12-13 PROCEDURE — 85025 COMPLETE CBC W/AUTO DIFF WBC: CPT

## 2021-12-13 PROCEDURE — 99285 EMERGENCY DEPT VISIT HI MDM: CPT

## 2021-12-13 PROCEDURE — 25010000002 ONDANSETRON PER 1 MG: Performed by: NURSE PRACTITIONER

## 2021-12-13 PROCEDURE — 71045 X-RAY EXAM CHEST 1 VIEW: CPT

## 2021-12-13 PROCEDURE — 87635 SARS-COV-2 COVID-19 AMP PRB: CPT | Performed by: NURSE PRACTITIONER

## 2021-12-13 PROCEDURE — 81001 URINALYSIS AUTO W/SCOPE: CPT

## 2021-12-13 PROCEDURE — 80053 COMPREHEN METABOLIC PANEL: CPT

## 2021-12-13 RX ORDER — LISINOPRIL 40 MG/1
40 TABLET ORAL ONCE
Status: COMPLETED | OUTPATIENT
Start: 2021-12-13 | End: 2021-12-13

## 2021-12-13 RX ORDER — ONDANSETRON 2 MG/ML
4 INJECTION INTRAMUSCULAR; INTRAVENOUS ONCE
Status: COMPLETED | OUTPATIENT
Start: 2021-12-13 | End: 2021-12-13

## 2021-12-13 RX ORDER — SODIUM CHLORIDE 0.9 % (FLUSH) 0.9 %
10 SYRINGE (ML) INJECTION AS NEEDED
Status: DISCONTINUED | OUTPATIENT
Start: 2021-12-13 | End: 2021-12-22 | Stop reason: HOSPADM

## 2021-12-13 RX ORDER — AMLODIPINE BESYLATE 5 MG/1
10 TABLET ORAL ONCE
Status: COMPLETED | OUTPATIENT
Start: 2021-12-13 | End: 2021-12-13

## 2021-12-13 RX ADMIN — ONDANSETRON 4 MG: 2 INJECTION INTRAMUSCULAR; INTRAVENOUS at 21:46

## 2021-12-13 RX ADMIN — LISINOPRIL 40 MG: 40 TABLET ORAL at 22:26

## 2021-12-13 RX ADMIN — SODIUM CHLORIDE 500 ML: 9 INJECTION, SOLUTION INTRAVENOUS at 21:50

## 2021-12-13 RX ADMIN — AMLODIPINE BESYLATE 10 MG: 5 TABLET ORAL at 22:26

## 2021-12-13 NOTE — ED TRIAGE NOTES
Patient to ER from home via EMS for complaints of n/v/ high bloods sugar 345 with ems and abd pain x 3 days. Patient in mask this RN in PPE

## 2021-12-14 PROBLEM — R19.7 DIARRHEA: Status: ACTIVE | Noted: 2021-12-14

## 2021-12-14 LAB
ANION GAP SERPL CALCULATED.3IONS-SCNC: 7.2 MMOL/L (ref 5–15)
BASOPHILS # BLD AUTO: 0.01 10*3/MM3 (ref 0–0.2)
BASOPHILS NFR BLD AUTO: 0.2 % (ref 0–1.5)
BUN SERPL-MCNC: 24 MG/DL (ref 8–23)
BUN/CREAT SERPL: 10.8 (ref 7–25)
CALCIUM SPEC-SCNC: 8.2 MG/DL (ref 8.6–10.5)
CHLORIDE SERPL-SCNC: 110 MMOL/L (ref 98–107)
CO2 SERPL-SCNC: 21.8 MMOL/L (ref 22–29)
CREAT SERPL-MCNC: 2.23 MG/DL (ref 0.76–1.27)
DEPRECATED RDW RBC AUTO: 39.2 FL (ref 37–54)
EOSINOPHIL # BLD AUTO: 0.27 10*3/MM3 (ref 0–0.4)
EOSINOPHIL NFR BLD AUTO: 4.1 % (ref 0.3–6.2)
ERYTHROCYTE [DISTWIDTH] IN BLOOD BY AUTOMATED COUNT: 13.3 % (ref 12.3–15.4)
GFR SERPL CREATININE-BSD FRML MDRD: 30 ML/MIN/1.73
GLUCOSE BLDC GLUCOMTR-MCNC: 156 MG/DL (ref 70–130)
GLUCOSE BLDC GLUCOMTR-MCNC: 169 MG/DL (ref 70–130)
GLUCOSE SERPL-MCNC: 104 MG/DL (ref 65–99)
HCT VFR BLD AUTO: 30.2 % (ref 37.5–51)
HEMOCCULT STL QL: NEGATIVE
HGB BLD-MCNC: 9.9 G/DL (ref 13–17.7)
IMM GRANULOCYTES # BLD AUTO: 0.01 10*3/MM3 (ref 0–0.05)
IMM GRANULOCYTES NFR BLD AUTO: 0.2 % (ref 0–0.5)
LYMPHOCYTES # BLD AUTO: 1.44 10*3/MM3 (ref 0.7–3.1)
LYMPHOCYTES NFR BLD AUTO: 22.1 % (ref 19.6–45.3)
MCH RBC QN AUTO: 26.8 PG (ref 26.6–33)
MCHC RBC AUTO-ENTMCNC: 32.8 G/DL (ref 31.5–35.7)
MCV RBC AUTO: 81.6 FL (ref 79–97)
MONOCYTES # BLD AUTO: 0.62 10*3/MM3 (ref 0.1–0.9)
MONOCYTES NFR BLD AUTO: 9.5 % (ref 5–12)
NEUTROPHILS NFR BLD AUTO: 4.17 10*3/MM3 (ref 1.7–7)
NEUTROPHILS NFR BLD AUTO: 63.9 % (ref 42.7–76)
NRBC BLD AUTO-RTO: 0 /100 WBC (ref 0–0.2)
PLATELET # BLD AUTO: 233 10*3/MM3 (ref 140–450)
PMV BLD AUTO: 10.2 FL (ref 6–12)
POTASSIUM SERPL-SCNC: 3.7 MMOL/L (ref 3.5–5.2)
RBC # BLD AUTO: 3.7 10*6/MM3 (ref 4.14–5.8)
SARS-COV-2 RNA PNL SPEC NAA+PROBE: NOT DETECTED
SODIUM SERPL-SCNC: 139 MMOL/L (ref 136–145)
T4 FREE SERPL-MCNC: 1.07 NG/DL (ref 0.93–1.7)
TSH SERPL DL<=0.05 MIU/L-ACNC: 4.3 UIU/ML (ref 0.27–4.2)
WBC NRBC COR # BLD: 6.52 10*3/MM3 (ref 3.4–10.8)

## 2021-12-14 PROCEDURE — 63710000001 INSULIN LISPRO (HUMAN) PER 5 UNITS: Performed by: NURSE PRACTITIONER

## 2021-12-14 PROCEDURE — 25010000002 FUROSEMIDE PER 20 MG: Performed by: STUDENT IN AN ORGANIZED HEALTH CARE EDUCATION/TRAINING PROGRAM

## 2021-12-14 PROCEDURE — 99222 1ST HOSP IP/OBS MODERATE 55: CPT | Performed by: INTERNAL MEDICINE

## 2021-12-14 PROCEDURE — 84439 ASSAY OF FREE THYROXINE: CPT | Performed by: NURSE PRACTITIONER

## 2021-12-14 PROCEDURE — 82272 OCCULT BLD FECES 1-3 TESTS: CPT | Performed by: NURSE PRACTITIONER

## 2021-12-14 PROCEDURE — 80048 BASIC METABOLIC PNL TOTAL CA: CPT | Performed by: NURSE PRACTITIONER

## 2021-12-14 PROCEDURE — 63710000001 INSULIN GLARGINE PER 5 UNITS: Performed by: NURSE PRACTITIONER

## 2021-12-14 PROCEDURE — 82962 GLUCOSE BLOOD TEST: CPT

## 2021-12-14 PROCEDURE — 84443 ASSAY THYROID STIM HORMONE: CPT | Performed by: NURSE PRACTITIONER

## 2021-12-14 PROCEDURE — 85025 COMPLETE CBC W/AUTO DIFF WBC: CPT | Performed by: NURSE PRACTITIONER

## 2021-12-14 RX ORDER — NITROGLYCERIN 0.4 MG/1
0.4 TABLET SUBLINGUAL
Status: DISCONTINUED | OUTPATIENT
Start: 2021-12-14 | End: 2021-12-22 | Stop reason: HOSPADM

## 2021-12-14 RX ORDER — LISINOPRIL 20 MG/1
20 TABLET ORAL DAILY
Status: DISCONTINUED | OUTPATIENT
Start: 2021-12-14 | End: 2021-12-18

## 2021-12-14 RX ORDER — CARVEDILOL 25 MG/1
25 TABLET ORAL 2 TIMES DAILY WITH MEALS
Status: DISCONTINUED | OUTPATIENT
Start: 2021-12-14 | End: 2021-12-21

## 2021-12-14 RX ORDER — AMLODIPINE BESYLATE 5 MG/1
5 TABLET ORAL DAILY
Status: DISCONTINUED | OUTPATIENT
Start: 2021-12-14 | End: 2021-12-21

## 2021-12-14 RX ORDER — TAMSULOSIN HYDROCHLORIDE 0.4 MG/1
0.4 CAPSULE ORAL NIGHTLY
Status: DISCONTINUED | OUTPATIENT
Start: 2021-12-14 | End: 2021-12-22 | Stop reason: HOSPADM

## 2021-12-14 RX ORDER — LEVOTHYROXINE SODIUM 0.07 MG/1
75 TABLET ORAL
Status: DISCONTINUED | OUTPATIENT
Start: 2021-12-15 | End: 2021-12-22 | Stop reason: HOSPADM

## 2021-12-14 RX ORDER — FERROUS SULFATE 325(65) MG
325 TABLET ORAL
Status: DISCONTINUED | OUTPATIENT
Start: 2021-12-14 | End: 2021-12-22 | Stop reason: HOSPADM

## 2021-12-14 RX ORDER — ATORVASTATIN CALCIUM 80 MG/1
80 TABLET, FILM COATED ORAL NIGHTLY
Status: DISCONTINUED | OUTPATIENT
Start: 2021-12-14 | End: 2021-12-22 | Stop reason: HOSPADM

## 2021-12-14 RX ORDER — NICOTINE POLACRILEX 4 MG
15 LOZENGE BUCCAL
Status: DISCONTINUED | OUTPATIENT
Start: 2021-12-14 | End: 2021-12-22 | Stop reason: HOSPADM

## 2021-12-14 RX ORDER — FUROSEMIDE 10 MG/ML
40 INJECTION INTRAMUSCULAR; INTRAVENOUS EVERY 12 HOURS
Status: DISCONTINUED | OUTPATIENT
Start: 2021-12-14 | End: 2021-12-15

## 2021-12-14 RX ORDER — ASPIRIN 81 MG/1
81 TABLET ORAL DAILY
Status: DISCONTINUED | OUTPATIENT
Start: 2021-12-14 | End: 2021-12-22 | Stop reason: HOSPADM

## 2021-12-14 RX ORDER — INSULIN LISPRO 100 [IU]/ML
0-9 INJECTION, SOLUTION INTRAVENOUS; SUBCUTANEOUS
Status: DISCONTINUED | OUTPATIENT
Start: 2021-12-14 | End: 2021-12-22 | Stop reason: HOSPADM

## 2021-12-14 RX ORDER — POTASSIUM CHLORIDE 1.5 G/1.77G
40 POWDER, FOR SOLUTION ORAL AS NEEDED
Status: DISCONTINUED | OUTPATIENT
Start: 2021-12-14 | End: 2021-12-21

## 2021-12-14 RX ORDER — INSULIN GLARGINE 100 [IU]/ML
4 INJECTION, SOLUTION SUBCUTANEOUS NIGHTLY
Status: DISCONTINUED | OUTPATIENT
Start: 2021-12-14 | End: 2021-12-16

## 2021-12-14 RX ORDER — POTASSIUM CHLORIDE 750 MG/1
40 TABLET, FILM COATED, EXTENDED RELEASE ORAL AS NEEDED
Status: DISCONTINUED | OUTPATIENT
Start: 2021-12-14 | End: 2021-12-21

## 2021-12-14 RX ORDER — FOLIC ACID 1 MG/1
1000 TABLET ORAL DAILY
Status: DISCONTINUED | OUTPATIENT
Start: 2021-12-14 | End: 2021-12-22 | Stop reason: HOSPADM

## 2021-12-14 RX ORDER — BISACODYL 10 MG
10 SUPPOSITORY, RECTAL RECTAL DAILY PRN
Status: DISCONTINUED | OUTPATIENT
Start: 2021-12-14 | End: 2021-12-22 | Stop reason: HOSPADM

## 2021-12-14 RX ORDER — AMLODIPINE BESYLATE 5 MG/1
5 TABLET ORAL DAILY
COMMUNITY
End: 2021-12-22 | Stop reason: HOSPADM

## 2021-12-14 RX ORDER — SODIUM CHLORIDE 0.9 % (FLUSH) 0.9 %
10 SYRINGE (ML) INJECTION EVERY 12 HOURS SCHEDULED
Status: DISCONTINUED | OUTPATIENT
Start: 2021-12-14 | End: 2021-12-22 | Stop reason: HOSPADM

## 2021-12-14 RX ORDER — POLYETHYLENE GLYCOL 3350 17 G/17G
17 POWDER, FOR SOLUTION ORAL DAILY PRN
Status: DISCONTINUED | OUTPATIENT
Start: 2021-12-14 | End: 2021-12-17

## 2021-12-14 RX ORDER — DEXTROSE MONOHYDRATE 25 G/50ML
25 INJECTION, SOLUTION INTRAVENOUS
Status: DISCONTINUED | OUTPATIENT
Start: 2021-12-14 | End: 2021-12-14 | Stop reason: SDUPTHER

## 2021-12-14 RX ORDER — INSULIN GLARGINE 100 [IU]/ML
4 INJECTION, SOLUTION SUBCUTANEOUS NIGHTLY
Status: DISCONTINUED | OUTPATIENT
Start: 2021-12-14 | End: 2021-12-14 | Stop reason: CLARIF

## 2021-12-14 RX ORDER — BUPROPION HYDROCHLORIDE 150 MG/1
150 TABLET ORAL DAILY
Status: DISCONTINUED | OUTPATIENT
Start: 2021-12-14 | End: 2021-12-22 | Stop reason: HOSPADM

## 2021-12-14 RX ORDER — MAGNESIUM SULFATE HEPTAHYDRATE 40 MG/ML
4 INJECTION, SOLUTION INTRAVENOUS AS NEEDED
Status: DISCONTINUED | OUTPATIENT
Start: 2021-12-14 | End: 2021-12-21

## 2021-12-14 RX ORDER — SODIUM CHLORIDE 0.9 % (FLUSH) 0.9 %
10 SYRINGE (ML) INJECTION AS NEEDED
Status: DISCONTINUED | OUTPATIENT
Start: 2021-12-14 | End: 2021-12-22 | Stop reason: HOSPADM

## 2021-12-14 RX ORDER — ACETAMINOPHEN 325 MG/1
650 TABLET ORAL EVERY 6 HOURS PRN
Status: DISCONTINUED | OUTPATIENT
Start: 2021-12-14 | End: 2021-12-22 | Stop reason: HOSPADM

## 2021-12-14 RX ORDER — ACETAMINOPHEN 325 MG/1
650 TABLET ORAL EVERY 4 HOURS PRN
Status: DISCONTINUED | OUTPATIENT
Start: 2021-12-14 | End: 2021-12-22 | Stop reason: HOSPADM

## 2021-12-14 RX ORDER — CHOLECALCIFEROL (VITAMIN D3) 125 MCG
1000 CAPSULE ORAL DAILY
Status: DISCONTINUED | OUTPATIENT
Start: 2021-12-14 | End: 2021-12-22 | Stop reason: HOSPADM

## 2021-12-14 RX ORDER — DEXTROSE MONOHYDRATE 25 G/50ML
25 INJECTION, SOLUTION INTRAVENOUS
Status: DISCONTINUED | OUTPATIENT
Start: 2021-12-14 | End: 2021-12-22 | Stop reason: HOSPADM

## 2021-12-14 RX ORDER — NICOTINE POLACRILEX 4 MG
15 LOZENGE BUCCAL
Status: DISCONTINUED | OUTPATIENT
Start: 2021-12-14 | End: 2021-12-14 | Stop reason: SDUPTHER

## 2021-12-14 RX ORDER — AMOXICILLIN 250 MG
2 CAPSULE ORAL DAILY
Status: DISCONTINUED | OUTPATIENT
Start: 2021-12-14 | End: 2021-12-17

## 2021-12-14 RX ORDER — MAGNESIUM SULFATE HEPTAHYDRATE 40 MG/ML
2 INJECTION, SOLUTION INTRAVENOUS AS NEEDED
Status: DISCONTINUED | OUTPATIENT
Start: 2021-12-14 | End: 2021-12-21

## 2021-12-14 RX ADMIN — ATORVASTATIN CALCIUM 80 MG: 80 TABLET, FILM COATED ORAL at 20:45

## 2021-12-14 RX ADMIN — FUROSEMIDE 40 MG: 10 INJECTION, SOLUTION INTRAMUSCULAR; INTRAVENOUS at 20:45

## 2021-12-14 RX ADMIN — ASPIRIN 81 MG: 81 TABLET, COATED ORAL at 15:33

## 2021-12-14 RX ADMIN — BUPROPION HYDROCHLORIDE 150 MG: 150 TABLET, EXTENDED RELEASE ORAL at 15:33

## 2021-12-14 RX ADMIN — LISINOPRIL 20 MG: 20 TABLET ORAL at 17:29

## 2021-12-14 RX ADMIN — SODIUM CHLORIDE, PRESERVATIVE FREE 10 ML: 5 INJECTION INTRAVENOUS at 11:17

## 2021-12-14 RX ADMIN — INSULIN GLARGINE 4 UNITS: 100 INJECTION, SOLUTION SUBCUTANEOUS at 22:12

## 2021-12-14 RX ADMIN — TAMSULOSIN HYDROCHLORIDE 0.4 MG: 0.4 CAPSULE ORAL at 20:45

## 2021-12-14 RX ADMIN — DOCUSATE SODIUM 50MG AND SENNOSIDES 8.6MG 2 TABLET: 8.6; 5 TABLET, FILM COATED ORAL at 13:53

## 2021-12-14 RX ADMIN — AMLODIPINE BESYLATE 5 MG: 5 TABLET ORAL at 15:33

## 2021-12-14 RX ADMIN — CARVEDILOL 25 MG: 25 TABLET, FILM COATED ORAL at 17:29

## 2021-12-14 RX ADMIN — FOLIC ACID 1000 MCG: 1 TABLET ORAL at 15:33

## 2021-12-14 RX ADMIN — Medication 1000 MCG: at 15:33

## 2021-12-14 RX ADMIN — FERROUS SULFATE TAB 325 MG (65 MG ELEMENTAL FE) 325 MG: 325 (65 FE) TAB at 17:29

## 2021-12-14 RX ADMIN — INSULIN LISPRO 2 UNITS: 100 INJECTION, SOLUTION INTRAVENOUS; SUBCUTANEOUS at 17:29

## 2021-12-14 RX ADMIN — INSULIN LISPRO 2 UNITS: 100 INJECTION, SOLUTION INTRAVENOUS; SUBCUTANEOUS at 22:10

## 2021-12-14 RX ADMIN — SODIUM CHLORIDE, PRESERVATIVE FREE 10 ML: 5 INJECTION INTRAVENOUS at 20:46

## 2021-12-14 RX ADMIN — FUROSEMIDE 40 MG: 10 INJECTION, SOLUTION INTRAMUSCULAR; INTRAVENOUS at 07:41

## 2021-12-14 NOTE — PLAN OF CARE
Goal Outcome Evaluation:  Plan of Care Reviewed With: patient        Progress: no change  Outcome Summary: VSS. new admit from ER. pt has no complaints. pt afib on monitor. on RA. urinal within reach. thoracentesis scheduled for tomorrow, consent signed. asked pt if wife could bring home cpap for night time use. bed alarm on. waiting for stool sample, pt aware of it. will CTM, safety maintained.

## 2021-12-14 NOTE — H&P
"    Patient Name:  Carlito Mo  YOB: 1955  MRN:  [id number]  Admit Date:  12/13/2021  Patient Care Team:  Florencia Caballero MD as PCP - General (Internal Medicine)  Amber Murguia MD as Consulting Physician (Cardiology)  Sera La [facility] as Pharmacist  Seth Ladd MD as Surgeon (General Surgery)  Kim Hoyos MD PhD as Consulting Physician (Hematology and Oncology)  Jerome Diallo MD as Referring Physician (Family Medicine)  Jose Enrique Louie MD as Consulting Physician (Gastroenterology)  Nima Huddleston MD as Consulting Physician (Nephrology)      Subjective   History Present Illness     Chief Complaint   Patient presents with   • Abdominal Pain   • Hyperglycemia     patient hasnt taking meds   • Nausea   • Vomiting   • Cough       Mr. Mo is a 66 y.o. never smoker with a history of type 2 diabetes mellitus, TIA, CAD status post CABG x5 vessel, COPD, CKD stage IIIb who presents to [facility] ER chief complaint of abdominal pain with associated nausea, vomiting, hypoglycemia, admitted for acute on chronic combined systolic and diastolic congestive heart failure.    Answering all questions appropriate appears reliable historian.  Reportedly does not wear oxygen at home & occasionally wears CPAP but less often now with concerns for recall.    Vomiting x1 after eating [employer].  Denies abdominal pain.  Reports second-hand exposure. Admits to missed doses of diuretics at home due to complaints of urinating \"too much\" and also reportedly 'out of diuretics'.  Complains of shortness of breath with CT is a very large right pleural effusion.    Recommendations pending hospital course.  Details below and assessment/plan.    History of Present Illness  Review of Systems   Constitutional: Positive for chills. Negative for fever.   HENT: Negative for congestion and rhinorrhea.    Respiratory: Positive for cough (intermittent, dry) and shortness of breath.    Cardiovascular: " Positive for chest pain and leg swelling.   Gastrointestinal: Positive for diarrhea (non-bloody), nausea and vomiting. Negative for abdominal pain.   Endocrine: Negative for polydipsia, polyphagia and polyuria.   Genitourinary: Negative for difficulty urinating and dysuria.   Musculoskeletal: Positive for gait problem (2/2 generalized weakness). Negative for myalgias.   Skin: Negative for rash and wound.   Neurological: Positive for dizziness, weakness (generalized) and light-headedness. Negative for syncope, numbness and headaches.   Psychiatric/Behavioral: Negative for confusion and hallucinations.        Personal History     Past Medical History:   Diagnosis Date   • Acquired hypothyroidism    • Acute congestive heart failure (HCC)    • Acute respiratory failure with hypoxia (HCC)    • Acute sinusitis    • SYLVAIN (acute kidney injury) (HCC)     on CKD   • Anemia    • Arthritis    • CAD (coronary artery disease)    • Cancer (HCC)     skin   • Chronic combined systolic and diastolic congestive heart failure (HCC)    • Chronic ischemic heart disease    • CKD (chronic kidney disease)    • COPD (chronic obstructive pulmonary disease) (HCC)    • Depression    • Diabetes mellitus type 2, uncontrolled, without complications    • Disease of thyroid gland    • Fatigue    • Frequent PVCs    • Heart attack (HCC)    • History of coronary artery disease     with remote history of bypass surgery in 2001   • History of transfusion    • Hyperglycemia    • Hyperlipidemia    • Hypertension    • Hypertensive encephalopathy    • Mental status change     Acute   • YAW on CPAP    • Pleural effusion    • Pneumonia    • RBBB (right bundle branch block)    • Screening for prostate cancer 2011   • Stroke (HCC)    • TIA (transient ischemic attack)    • Type 2 diabetes mellitus (HCC)    • Urinary retention    • Vitamin D deficiency      Past Surgical History:   Procedure Laterality Date   • APPENDECTOMY N/A 02/14/2016    Dr. Alexey Dodson    • COLONOSCOPY      over 20 years ago.  no polyps    • COLONOSCOPY N/A 2018    Procedure: COLONOSCOPY;  Surgeon: Jose Enrique Louie MD;  Location: [facility] ENDOSCOPY;  Service: Gastroenterology   • COLONOSCOPY N/A 2018    IH, EH, polyps (TAs w/low grade dysplasia)   • COLONOSCOPY N/A 2020    Procedure: COLONOSCOPY;  Surgeon: Jose Enrique Louie MD;  [facility];  Service: Gastroenterology;  Laterality: N/A;  Pre op-Anemia, Melena, History of Polyps  Post op-To Cecum/TI, Polyp, Poor Prep   • CORONARY ARTERY BYPASS GRAFT  2001   • ENDOSCOPY N/A 2020    Procedure: ESOPHAGOGASTRODUODENOSCOPY with biopsies;  Surgeon: Amanda Lovelace MD;  Location: [facility] ENDOSCOPY;  Service: Gastroenterology;  Laterality: N/A;  pre-anemia, dark stools  post-esophagitis, hiatal hernia   • ENDOSCOPY N/A 9/15/2020    Procedure: ESOPHAGOGASTRODUODENOSCOPY WITH BIOPSIES;  Surgeon: Jose Enrique Louie MD;  Location: [facility] ENDOSCOPY;  Service: Gastroenterology;  Laterality: N/A;  pre: history of melena and esophagitis  post: mild esophagitis and gastritis, small hiatal hernia   • HERNIA REPAIR Left 2019   • TONSILLECTOMY     • VASECTOMY       Family History   Problem Relation Age of Onset   • Diabetes Mother    • Hypertension Mother    • Macular degeneration Mother    • Alcohol abuse Father    • Cancer Father         lung,  age 65   • Heart disease Father    • Alcohol abuse Brother    • Cirrhosis Brother          age 50   • Liver cancer Brother 45   • Diabetes Son    • Kidney failure Son    • Autism Son      Social History     Tobacco Use   • Smoking status: Never Smoker   • Smokeless tobacco: Never Used   • Tobacco comment: daily caffeine   Vaping Use   • Vaping Use: Never used   Substance Use Topics   • Alcohol use: No   • Drug use: No     No current facility-administered medications on file prior to encounter.     Current Outpatient Medications on File Prior to Encounter    Medication Sig Dispense Refill   • amLODIPine (NORVASC) 5 MG tablet Take 5 mg by mouth Daily.     • aspirin 81 MG EC tablet Take 81 mg by mouth Daily.     • buPROPion XL (WELLBUTRIN XL) 150 MG 24 hr tablet TAKE ONE TABLET BY MOUTH EVERY MORNING     • carvedilol (COREG) 25 MG tablet Take 1 tablet by mouth 2 (Two) Times a Day With Meals. 60 tablet 0   • Cholecalciferol (VITAMIN D3) 5000 units capsule capsule Take 5,000 Units by mouth Daily.     • docusate sodium 100 MG capsule Take 100 mg by mouth.     • ferrous sulfate 325 (65 FE) MG tablet Take 1 tablet by mouth Daily With Breakfast & Dinner. 180 tablet 3   • folic acid (FOLVITE) 1 MG tablet TAKE ONE TABLET BY MOUTH DAILY 205 tablet 0   • furosemide (LASIX) 40 MG tablet Take 1 tablet by mouth Daily.     • levothyroxine (SYNTHROID, LEVOTHROID) 75 MCG tablet TAKE ONE TABLET BY MOUTH DAILY     • lisinopril (PRINIVIL,ZESTRIL) 10 MG tablet Take 2 tablets by mouth Daily.     • tamsulosin (FLOMAX) 0.4 MG capsule 24 hr capsule Take 1 capsule by mouth Every Night.     • vitamin B-12 (CYANOCOBALAMIN) 1000 MCG tablet TAKE ONE TABLET BY MOUTH DAILY 205 tablet 0   • acetaminophen (TYLENOL) 325 MG tablet Take 2 tablets by mouth Every 4 (Four) Hours As Needed for Mild Pain  or Fever (Temperature greater than or equal to 37 C).     • apixaban (ELIQUIS) 5 MG tablet tablet Take 1 tablet by mouth Every 12 (Twelve) Hours. Indications: Other - full anticoagulation 60 tablet 0   • atorvastatin (LIPITOR) 80 MG tablet Take 1 tablet by mouth Every Night. 30 tablet 0   • Insulin Glargine (BASAGLAR KWIKPEN) 100 UNIT/ML injection pen Inject 4 Units under the skin into the appropriate area as directed Every Night. Patient says he does not take consistently     • insulin glulisine (APIDRA) 100 UNIT/ML injection Inject 2 Units under the skin into the appropriate area as directed 3 (Three) Times a Day Before Meals. Patient says he does not take consistently     • melatonin 3 MG tablet Take 1  tablet by mouth Every Night. 30 tablet 0   • [DISCONTINUED] potassium chloride 10 MEQ CR tablet potassium chloride ER 10 mEq tablet,extended release     • [DISCONTINUED] spironolactone (ALDACTONE) 25 MG tablet Take 1 tablet by mouth Daily. 30 tablet 0     Allergies   Allergen Reactions   • Prozac [Fluoxetine Hcl] Unknown - Low Severity     shaking       Objective    Objective     Vital Signs  Temp:  [97.3 °F (36.3 °C)-97.8 °F (36.6 °C)] 97.3 °F (36.3 °C)  Heart Rate:  [] 73  Resp:  [18] 18  BP: (134-221)/() 160/94  SpO2:  [91 %-100 %] 95 %  on   ;   Device (Oxygen Therapy): room air  Body mass index is 25.77 kg/m².    Physical Exam  Constitutional:       General: He is not in acute distress.     Appearance: He is ill-appearing. He is not toxic-appearing.      Comments: Unkempt, chronically ill    HENT:      Head: Normocephalic and atraumatic.   Eyes:      Extraocular Movements: Extraocular movements intact.      Conjunctiva/sclera: Conjunctivae normal.   Cardiovascular:      Rate and Rhythm: Normal rate.      Heart sounds: Normal heart sounds.   Pulmonary:      Effort: Pulmonary effort is normal.      Comments: Diminished on expiration throughout lung fields  Abdominal:      General: Bowel sounds are normal. There is no distension.      Palpations: Abdomen is soft.      Tenderness: There is no abdominal tenderness. There is no guarding.   Musculoskeletal:         General: No tenderness.      Cervical back: Normal range of motion and neck supple.      Right lower leg: Edema present.      Left lower leg: Edema present.   Skin:     General: Skin is warm and dry.   Neurological:      Mental Status: He is alert and oriented to person, place, and time.      Cranial Nerves: No cranial nerve deficit.      Motor: Weakness (generalized) present.   Psychiatric:         Behavior: Behavior normal.         Thought Content: Thought content normal.         Results Review:  I reviewed the patient's new clinical  results.  I reviewed the patient's new imaging results and agree with the interpretation.  I reviewed the patient's other test results and agree with the interpretation  I personally viewed and interpreted the patient's EKG/Telemetry data  Discussed with ED provider.    Lab Results (last 24 hours)     Procedure Component Value Units Date/Time    CBC & Differential [[id number]]  (Abnormal) Collected: 12/13/21 1614    Specimen: Blood Updated: 12/13/21 1703    Narrative:      The following orders were created for panel order CBC & Differential.  Procedure                               Abnormality         Status                     ---------                               -----------         ------                     CBC Auto Differential[[id number]]        Abnormal            Final result                 Please view results for these tests on the individual orders.    Comprehensive Metabolic Panel [[id number]]  (Abnormal) Collected: 12/13/21 1614    Specimen: Blood Updated: 12/13/21 1728     Glucose 190 mg/dL      BUN 26 mg/dL      Creatinine 2.64 mg/dL      Sodium 138 mmol/L      Potassium 4.0 mmol/L      Chloride 107 mmol/L      CO2 23.7 mmol/L      Calcium 9.0 mg/dL      Total Protein 5.9 g/dL      Albumin 3.10 g/dL      ALT (SGPT) 8 U/L      AST (SGOT) 13 U/L      Alkaline Phosphatase 86 U/L      Total Bilirubin 0.2 mg/dL      eGFR Non African Amer 24 mL/min/1.73      Globulin 2.8 gm/dL      A/G Ratio 1.1 g/dL      BUN/Creatinine Ratio 9.8     Anion Gap 7.3 mmol/L     Narrative:      GFR Normal >60  Chronic Kidney Disease <60  Kidney Failure <15      Lipase [[id number]]  (Normal) Collected: 12/13/21 1614    Specimen: Blood Updated: 12/13/21 1728     Lipase 33 U/L     CBC Auto Differential [[id number]]  (Abnormal) Collected: 12/13/21 1614    Specimen: Blood Updated: 12/13/21 1703     WBC 6.11 10*3/mm3      RBC 4.39 10*6/mm3      Hemoglobin 11.7 g/dL      Hematocrit 37.0 %      MCV 84.3 fL      MCH 26.7 pg      MCHC 31.6  g/dL      RDW 13.2 %      RDW-SD 40.7 fl      MPV 10.1 fL      Platelets 283 10*3/mm3      Neutrophil % 72.5 %      Lymphocyte % 15.9 %      Monocyte % 7.2 %      Eosinophil % 3.8 %      Basophil % 0.3 %      Immature Grans % 0.3 %      Neutrophils, Absolute 4.43 10*3/mm3      Lymphocytes, Absolute 0.97 10*3/mm3      Monocytes, Absolute 0.44 10*3/mm3      Eosinophils, Absolute 0.23 10*3/mm3      Basophils, Absolute 0.02 10*3/mm3      Immature Grans, Absolute 0.02 10*3/mm3      nRBC 0.0 /100 WBC     BNP [[id number]]  (Abnormal) Collected: 12/13/21 1614    Specimen: Blood Updated: 12/13/21 2213     proBNP 7,027.0 pg/mL     Narrative:      Among patients with dyspnea, NT-proBNP is highly sensitive for the detection of acute congestive heart failure. In addition NT-proBNP of <300 pg/ml effectively rules out acute congestive heart failure with 99% negative predictive value.    Results may be falsely decreased if patient taking Biotin.      Urinalysis With Microscopic If Indicated (No Culture) - Urine, Clean Catch [[id number]]  (Abnormal) Collected: 12/13/21 1628    Specimen: Urine, Clean Catch Updated: 12/13/21 1653     Color, UA Yellow     Appearance, UA Clear     pH, UA 5.5     Specific Gravity, UA 1.025     Glucose,  mg/dL (2+)     Ketones, UA Negative     Bilirubin, UA Negative     Blood, UA Moderate (2+)     Protein, UA >=300 mg/dL (3+)     Leuk Esterase, UA Negative     Nitrite, UA Negative     Urobilinogen, UA 0.2 E.U./dL    Urinalysis, Microscopic Only - Urine, Clean Catch [[id number]]  (Abnormal) Collected: 12/13/21 1628    Specimen: Urine, Clean Catch Updated: 12/13/21 1708     RBC, UA 6-12 /HPF      WBC, UA 3-5 /HPF      Bacteria, UA None Seen /HPF      Squamous Epithelial Cells, UA None Seen /HPF      Hyaline Casts, UA 7-12 /LPF      Methodology Manual Light Microscopy    COVID PRE-OP / PRE-PROCEDURE SCREENING ORDER (NO ISOLATION) - Swab, Nasopharynx [[id number]]  (Normal) Collected: 12/13/21 2979     Specimen: Swab from Nasopharynx Updated: 12/14/21 0041    Narrative:      The following orders were created for panel order COVID PRE-OP / PRE-PROCEDURE SCREENING ORDER (NO ISOLATION) - Swab, Nasopharynx.  Procedure                               Abnormality         Status                     ---------                               -----------         ------                     COVID-19,[facility] IN-HOUSE...[[id number]]  Normal              Final result                 Please view results for these tests on the individual orders.    COVID-19,[facility] IN-HOUSE CEPHEID/ERROL NP SWAB IN TRANSPORT MEDIA 8-12 HR TAT - Swab, Nasopharynx [[id number]]  (Normal) Collected: 12/13/21 2304    Specimen: Swab from Nasopharynx Updated: 12/14/21 0041     COVID19 Not Detected    Narrative:      Fact sheet for providers: https://www.fda.gov/media/124257/download    Fact sheet for patients: https://www.fda.gov/media/840102/download    Test performed by PCR.    CBC & Differential [[id number]]  (Abnormal) Collected: 12/14/21 0421    Specimen: Blood Updated: 12/14/21 0449    Narrative:      The following orders were created for panel order CBC & Differential.  Procedure                               Abnormality         Status                     ---------                               -----------         ------                     CBC Auto Differential[[id number]]        Abnormal            Final result                 Please view results for these tests on the individual orders.    Basic Metabolic Panel [[id number]]  (Abnormal) Collected: 12/14/21 0421    Specimen: Blood Updated: 12/14/21 0511     Glucose 104 mg/dL      BUN 24 mg/dL      Creatinine 2.23 mg/dL      Sodium 139 mmol/L      Potassium 3.7 mmol/L      Comment: Slight hemolysis detected by analyzer. Results may be affected.        Chloride 110 mmol/L      CO2 21.8 mmol/L      Calcium 8.2 mg/dL      eGFR Non African Amer 30 mL/min/1.73      BUN/Creatinine Ratio 10.8     Anion Gap 7.2  mmol/L     Narrative:      GFR Normal >60  Chronic Kidney Disease <60  Kidney Failure <15      CBC Auto Differential [[id number]]  (Abnormal) Collected: 12/14/21 0421    Specimen: Blood Updated: 12/14/21 0449     WBC 6.52 10*3/mm3      RBC 3.70 10*6/mm3      Hemoglobin 9.9 g/dL      Hematocrit 30.2 %      MCV 81.6 fL      MCH 26.8 pg      MCHC 32.8 g/dL      RDW 13.3 %      RDW-SD 39.2 fl      MPV 10.2 fL      Platelets 233 10*3/mm3      Neutrophil % 63.9 %      Lymphocyte % 22.1 %      Monocyte % 9.5 %      Eosinophil % 4.1 %      Basophil % 0.2 %      Immature Grans % 0.2 %      Neutrophils, Absolute 4.17 10*3/mm3      Lymphocytes, Absolute 1.44 10*3/mm3      Monocytes, Absolute 0.62 10*3/mm3      Eosinophils, Absolute 0.27 10*3/mm3      Basophils, Absolute 0.01 10*3/mm3      Immature Grans, Absolute 0.01 10*3/mm3      nRBC 0.0 /100 WBC     TSH [[id number]] Collected: 12/14/21 0421    Specimen: Blood Updated: 12/14/21 1354    T4, Free [[id number]] Collected: 12/14/21 0421    Specimen: Blood Updated: 12/14/21 1354          Imaging Results (Last 24 Hours)     Procedure Component Value Units Date/Time    XR Chest 1 View [[id number]] Collected: 12/14/21 0008     Updated: 12/14/21 0012    Narrative:      SINGLE VIEW OF THE CHEST     HISTORY: Shortness of air     COMPARISON: 05/20/2021     FINDINGS:  Cardiomegaly is present. There is vascular congestion. There is dense  bibasilar consolidation, as well as bilateral pleural effusions, right  greater than left. No pneumothorax is seen.       Impression:      As above.     This report was finalized on 12/14/2021 12:09 AM by Dr. lAice Allen M.D.       CT Abdomen Pelvis Without Contrast [[id number]] Collected: 12/13/21 2154     Updated: 12/13/21 2203    Narrative:      CT OF THE ABDOMEN AND PELVIS WITHOUT CONTRAST     HISTORY: Mid abdominal pain. Nausea and vomiting.     COMPARISON: 12/01/2017     TECHNIQUE: Axial CT imaging was obtained through the abdomen and pelvis.  No  IV contrast was administered.     FINDINGS:  Images through the lung bases straight bibasilar atelectasis. The  patient has a large right pleural effusion and moderate to large left  pleural effusion. The stomach and duodenum are unremarkable. No focal  hepatic lesions are seen. Adrenal glands and spleen, pancreas, and  gallbladder are grossly unremarkable. There is significant respiratory  artifact. No hydronephrosis is seen on either side. No urinary stones  are seen. There is calcification of the aorta. Prostate gland is  enlarged and protrudes into the base of the bladder. Urinary bladder is  otherwise unremarkable. There is no bowel obstruction. Appendix is  surgically absent. There is body wall edema. No acute osseous  abnormalities are seen.       Impression:         1. No acute intra-abdominal or intrapelvic process seen.  2. Large right pleural effusion and moderate to large left pleural  effusion.     Radiation dose reduction techniques were utilized, including automated  exposure control and exposure modulation based on body size.     This report was finalized on 12/13/2021 9:59 PM by Dr. Alice Allen M.D.             Results for orders placed during the hospital encounter of 05/20/21    Adult Transthoracic Echo Complete W/ Cont if Necessary Per Protocol (With Agitated Saline)    Interpretation Summary  · There is moderate to severe global hypokinesis  · Left ventricular ejection fraction appears to be 31 - 35%.  · Left ventricular diastolic function is consistent with (grade III w/high LAP) fixed restrictive pattern  · The right ventricular cavity is mildly dilated. Moderately reduced right ventricular systolic function noted.  · The left atrial cavity is moderately dilated.  · Mild mitral valve regurgitation is present  · There is a small (<1cm) pericardial effusion.  · There is a large left pleural effusion.      No orders to display        Assessment/Plan     Active Hospital Problems     Diagnosis  POA   • **Acute on chronic combined systolic and diastolic congestive heart failure (HCC) [I50.43]  Yes   • Diarrhea [R19.7]  Yes   • Lower abdominal pain [R10.30]  Yes   • Cardiomyopathy (HCC) [I42.9]  Yes   • Patent foramen ovale [Q21.1]  Not Applicable   • Pleural effusion, bilateral [J90]  Yes   • Essential hypertension [I10]  Yes   • Anemia, chronic renal failure, stage 3 (moderate) (HCC) [N18.30, D63.1]  Yes   • Chronically elevated troponin [R77.8]  Yes   • Coronary artery disease involving native coronary artery of native heart without angina pectoris [I25.10]  Yes   • YAW (obstructive sleep apnea) [G47.33]  Yes   • Pleural effusion [J90]  Yes   • Hypertensive urgency [I16.0]  Yes   • Acquired hypothyroidism [E03.9]  Yes   • Stage 3b chronic kidney disease (HCC) [N18.32]  Yes   • 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       Mr. Mo is a 66 y.o. never smoker with a history of type 2 diabetes mellitus, TIA, CAD status post CABG x5 vessel, COPD, CKD stage IIIb who presents to [facility] ER chief complaint of abdominal pain with associated nausea, vomiting, hypoglycemia, admitted for acute on chronic combined systolic and diastolic congestive heart failure.      Acute on chronic combined systolic and diastolic congestive heart failure (HCC) /   Cardiomyopathy (HCC) /   Patent foramen ovale /chronically elevated troponin /coronary artery disease  Cardiology following--repeat EKG  Orthostatic blood pressures      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)  A1c in a.m.  Moderate dose lispro sliding scale  Cardiac consistent carb diet      Stage 3b chronic kidney disease (HCC)  Serum creatinine baseline 2.3-2.6  Avoid nephrotoxins  Consider nephrology consultation and monitor labs in the setting of aggressive IV diuretic      Acquired hypothyroidism  Ordering TSH, free T4 given history of  noncompliance  Continue levothyroxine per home dose regimen      Hypertensive urgency / Essential hypertension   Likely due to noncompliance  BP improving with trend following lisinopril 40 mg p.o. once, amlodipine 10 mg p.o. once  Antihypertensive agents      Pleural effusion, bilateral  Noted on lung bases of CT abdomen pelvis  Pulmonary consultation for possible thoracentesis  Hold anticoagulation; however, patient reportedly not compliant with most meds and unsure if taking Eliquis recently      YAW (obstructive sleep apnea)  Ordering NIPPV       Anemia, chronic renal failure, stage 3 (moderate) (HCC)  History of GI bleed (denies hematemesis, melena, hematochezia)  Serum hemoglobin 9.9 decreased from 11.7 (12/13)  No overt signs of active bleeding  Ordering stool Hemoccult  Monitor labs      Lower abdominal pain / Diarrhea  Afebrile, absence of leukocytosis (no current suspicion for bacterial process)  CT abdomen pelvis no acute intra-abdominal process  GI PCR      · I discussed the patient's findings and my recommendations with Dr. Juan.    VTE Prophylaxis - SCD  Code Status - CPR       JI Sheriff  [facility]  12/14/21  14:01 EST

## 2021-12-14 NOTE — ED NOTES
Nursing report ED to floor  Carlito Mo  66 y.o.  male    HPI :   Chief Complaint   Patient presents with   • Abdominal Pain   • Hyperglycemia     patient hasnt taking meds   • Nausea   • Vomiting   • Cough       Admitting doctor:   Emily Juan DO    Admitting diagnosis:   The primary encounter diagnosis was Lower abdominal pain. Diagnoses of Mild shortness of breath, Elevated brain natriuretic peptide (BNP) level, Stage 3 chronic kidney disease, unspecified whether stage 3a or 3b CKD (HCC), and Pleural effusion were also pertinent to this visit.    Code status:   Current Code Status     Date Active Code Status Order ID Comments User Context       12/14/2021 0045 CPR (Attempt to Resuscitate) [id number]  Maya Archibald, APRN ED     Advance Care Planning Activity      Questions for Current Code Status     Question Answer    Code Status (Patient has no pulse and is not breathing) CPR (Attempt to Resuscitate)    Medical Interventions (Patient has pulse or is breathing) Full Support          Allergies:   Prozac [fluoxetine hcl]    Intake and Output    Intake/Output Summary (Last 24 hours) at 12/14/2021 1211  Last data filed at 12/14/2021 1210  Gross per 24 hour   Intake 500 ml   Output 500 ml   Net 0 ml       Weight:       12/13/21 2116   Weight: 86.2 kg (190 lb)       Most recent vitals:   Vitals:    12/14/21 0916 12/14/21 0923 12/14/21 1116 12/14/21 1121   BP: 156/87  151/91 152/86   Pulse: 74 77 100 69   Resp:       Temp:       SpO2: 93% 92% 96% 91%   Weight:       Height:           Active LDAs/IV Access:   Lines, Drains & Airways     Active LDAs     Name Placement date Placement time Site Days    Peripheral IV 12/13/21 2119 Left Antecubital 12/13/21 2119  Antecubital  less than 1                Labs (abnormal labs have a star):   Labs Reviewed   COMPREHENSIVE METABOLIC PANEL - Abnormal; Notable for the following components:       Result Value    Glucose 190 (*)     BUN 26 (*)     Creatinine 2.64 (*)      Total Protein 5.9 (*)     Albumin 3.10 (*)     eGFR Non  Amer 24 (*)     All other components within normal limits    Narrative:     GFR Normal >60  Chronic Kidney Disease <60  Kidney Failure <15     URINALYSIS W/ MICROSCOPIC IF INDICATED (NO CULTURE) - Abnormal; Notable for the following components:    Glucose,  mg/dL (2+) (*)     Blood, UA Moderate (2+) (*)     Protein, UA >=300 mg/dL (3+) (*)     All other components within normal limits   CBC WITH AUTO DIFFERENTIAL - Abnormal; Notable for the following components:    Hemoglobin 11.7 (*)     Hematocrit 37.0 (*)     Lymphocyte % 15.9 (*)     All other components within normal limits   URINALYSIS, MICROSCOPIC ONLY - Abnormal; Notable for the following components:    RBC, UA 6-12 (*)     WBC, UA 3-5 (*)     All other components within normal limits   BNP (IN-HOUSE) - Abnormal; Notable for the following components:    proBNP 7,027.0 (*)     All other components within normal limits    Narrative:     Among patients with dyspnea, NT-proBNP is highly sensitive for the detection of acute congestive heart failure. In addition NT-proBNP of <300 pg/ml effectively rules out acute congestive heart failure with 99% negative predictive value.    Results may be falsely decreased if patient taking Biotin.     BASIC METABOLIC PANEL - Abnormal; Notable for the following components:    Glucose 104 (*)     BUN 24 (*)     Creatinine 2.23 (*)     Chloride 110 (*)     CO2 21.8 (*)     Calcium 8.2 (*)     eGFR Non  Amer 30 (*)     All other components within normal limits    Narrative:     GFR Normal >60  Chronic Kidney Disease <60  Kidney Failure <15     CBC WITH AUTO DIFFERENTIAL - Abnormal; Notable for the following components:    RBC 3.70 (*)     Hemoglobin 9.9 (*)     Hematocrit 30.2 (*)     All other components within normal limits   COVID-19,[facility] IN-HOUSE CEPHEID/ERROL, NP SWAB IN TRANSPORT MEDIA 8-12 HR TAT - Normal    Narrative:     Fact sheet for providers:  https://www.fda.gov/media/953274/download    Fact sheet for patients: https://www.fda.gov/media/236470/download    Test performed by PCR.   LIPASE - Normal   COVID PRE-OP / PRE-PROCEDURE SCREENING ORDER (NO ISOLATION)    Narrative:     The following orders were created for panel order COVID PRE-OP / PRE-PROCEDURE SCREENING ORDER (NO ISOLATION) - Swab, Nasopharynx.  Procedure                               Abnormality         Status                     ---------                               -----------         ------                     COVID-19,[facility] IN-HOUSE...[[id number]]  Normal              Final result                 Please view results for these tests on the individual orders.   GASTROINTESTINAL PANEL, PCR   RAINBOW DRAW    Narrative:     The following orders were created for panel order [place] Draw.  Procedure                               Abnormality         Status                     ---------                               -----------         ------                     Green Top (Gel)[[id number]]                                  Final result               Lavender Top[[id number]]                                     Final result               Gold Top - SST[[id number]]                                   Final result               Light Blue Top[[id number]]                                   Final result                 Please view results for these tests on the individual orders.   POCT GLUCOSE FINGERSTICK   POCT GLUCOSE FINGERSTICK   POCT GLUCOSE FINGERSTICK   POCT GLUCOSE FINGERSTICK   POCT GLUCOSE FINGERSTICK   POCT GLUCOSE FINGERSTICK   CBC AND DIFFERENTIAL    Narrative:     The following orders were created for panel order CBC & Differential.  Procedure                               Abnormality         Status                     ---------                               -----------         ------                     CBC Auto Differential[[id number]]        Abnormal            Final result                  Please view results for these tests on the individual orders.   GREEN TOP   LAVENDER TOP   GOLD TOP - SST   LIGHT BLUE TOP   CBC AND DIFFERENTIAL    Narrative:     The following orders were created for panel order CBC & Differential.  Procedure                               Abnormality         Status                     ---------                               -----------         ------                     CBC Auto Differential[[id number]]        Abnormal            Final result                 Please view results for these tests on the individual orders.       EKG:   No orders to display       Meds given in ED:   Medications   sodium chloride 0.9 % flush 10 mL (has no administration in time range)   sodium chloride 0.9 % flush 10 mL (has no administration in time range)   sodium chloride 0.9 % flush 10 mL (10 mL Intravenous Given 12/14/21 1117)   sodium chloride 0.9 % flush 10 mL (has no administration in time range)   dextrose (GLUTOSE) oral gel 15 g (has no administration in time range)   dextrose (D50W) (25 g/50 mL) IV injection 25 g (has no administration in time range)   glucagon (human recombinant) (GLUCAGEN DIAGNOSTIC) injection 1 mg (has no administration in time range)   nitroglycerin (NITROSTAT) SL tablet 0.4 mg (has no administration in time range)   acetaminophen (TYLENOL) tablet 650 mg (has no administration in time range)   furosemide (LASIX) injection 40 mg (40 mg Intravenous Given 12/14/21 0741)   sodium chloride 0.9 % bolus 500 mL (0 mL Intravenous Stopped 12/13/21 2236)   ondansetron (ZOFRAN) injection 4 mg (4 mg Intravenous Given 12/13/21 2146)   lisinopril (PRINIVIL,ZESTRIL) tablet 40 mg (40 mg Oral Given 12/13/21 2226)   amLODIPine (NORVASC) tablet 10 mg (10 mg Oral Given 12/13/21 2226)       Imaging results:  CT Abdomen Pelvis Without Contrast    Result Date: 12/13/2021   1. No acute intra-abdominal or intrapelvic process seen. 2. Large right pleural effusion and moderate to large left  pleural effusion.  Radiation dose reduction techniques were utilized, including automated exposure control and exposure modulation based on body size.  This report was finalized on 12/13/2021 9:59 PM by Dr. Alice Allen M.D.      XR Chest 1 View    Result Date: 12/14/2021  As above.  This report was finalized on 12/14/2021 12:09 AM by Dr. Alice Allen M.D.        Ambulatory status:   - with assist    Social issues:   Social History     Socioeconomic History   • Marital status:      Spouse name: Lissette   • Number of children: 3   • Years of education: college   Tobacco Use   • Smoking status: Never Smoker   • Smokeless tobacco: Never Used   • Tobacco comment: daily caffeine   Vaping Use   • Vaping Use: Never used   Substance and Sexual Activity   • Alcohol use: No   • Drug use: No   • Sexual activity: Defer       NIH Stroke Scale:        Nursing report ED to floor:       Jose Enrique Dominguez RN  12/14/21 1212

## 2021-12-14 NOTE — CONSULTS
Date of Hospital Visit: 2021  Encounter Provider: MD Sanjuana  Place of Service: [facility] CARDIOLOGY  Patient Name: Carlito Mo  :1955  Referral Provider: Delta Marie MD    Chief complaint N/V and abdominal pain     History of Present Illness Carlito Mo is a 66 year old pt  Of Dr. Murguia with a history of chronic systolic heart failure, CKD II (seen by Dr. Huddleston), CAD/MI s/p CABG in , HLD, DM II, and basal cell carcinoma.     Pt was initially seen in  when he was adnitted for altered mental status. He was diagnosed with multiple small acute infarcts. An ECHO I  2017 showed  mild left ventricular hypertrophy, mild left atrial enlargement, negative bubble study, normal LV systolic function with an ejection fraction of 68%.  Transesophageal echo in 2017 showed that his LV function was mildly low with an ejection fraction of 45% with focal wall motion abnormalities primarily in the septum and anterior wall and apex.  Small patent foramen ovale was noted there was no significant valvular heart disease or cardiac source of embolus minus the small PFO. A holter showed a couple of short bursts of SVT but no sustained arrhythmia    Pt presented in 2018 with hypoxia and lethargy after a colonoscopy. AN ECHO then showed LV function had decreased and his ejection fraction was now 46%.  There was grade II diastolic dysfunction and mildly elevated pulmonary pressures with a left pleural effusion.  He was treated for pneumonia and diuresed.     In  pt presented with progressive weakness and SYLVAIN and anemia. He required IVFs. EGD showed nonbleeding gastritis he had pronounced orthostatic hypotension and his medications were discontinued.His follow up in  he denied any bleeding. He was orthostatic and was on Midrin.     He had an echo in May 2021 which showed an EF of 30 to 35%.  He had no angina at the time and ischemic work-up was deferred.  Patient had  been doing well until about a week ago.  He has been out of his heart failure medications for at least a week.  He is not compliant with sodium or volume restriction.  He drinks several pops per day because there is a special at the [employer].  States that he does not check his blood glucose at home but the last time was checked it was over 300.  He denies orthopnea, PND or edema.  No angina.  No palpitations, dizziness or syncope.    Pt presented to ER with complaints of nausea, vomiting and abdominal pain after eating a [employer] hamburger. In ER BUN/CR 26/2.64,HGB 11.7,  BNP 7027, UA negative for nitrates and bacteria, CT of ABD/pelvis showed No acute intra-abdominal or intrapelvic process seen. 2. Large right pleural effusion and moderate to large left pleural effusion.  EKG was performed but is not crossed over into the system and I do not have anything for evaluation at time of interview.  Patient is comfortable at time of interview.  He has no cardiac complaints at this time.    HPI was reviewed, updated and amended when necessary.    ECHO 5/21/21    · There is moderate to severe global hypokinesis  · Left ventricular ejection fraction appears to be 31 - 35%.  · Left ventricular diastolic function is consistent with (grade III w/high LAP) fixed restrictive pattern  · The right ventricular cavity is mildly dilated. Moderately reduced right ventricular systolic function noted.  · The left atrial cavity is moderately dilated.  · Mild mitral valve regurgitation is present  · There is a small (<1cm) pericardial effusion.  · There is a large left pleural effusion.           Stress Test 4/24/19      · Myocardial perfusion imaging indicates a medium-sized infarct located in the apex/periapical areas with no significant ischemia noted.  · Left ventricular ejection fraction is mildly reduced (Calculated EF = 44%).        Past Medical History:   Diagnosis Date   • Acquired hypothyroidism    • Acute congestive heart failure  (CMS/HCC)    • Acute respiratory failure with hypoxia (CMS/HCC)    • Acute sinusitis    • SYLVAIN (acute kidney injury) (CMS/HCC)     on CKD   • Anemia    • Arthritis    • CAD (coronary artery disease)    • Cancer (CMS/HCC)     skin   • Chronic combined systolic and diastolic congestive heart failure (CMS/HCC)    • Chronic ischemic heart disease    • CKD (chronic kidney disease)    • COPD (chronic obstructive pulmonary disease) (CMS/HCC)    • Depression    • Diabetes mellitus type 2, uncontrolled, without complications    • Disease of thyroid gland    • Fatigue    • Frequent PVCs    • Heart attack (CMS/HCC)    • History of coronary artery disease     with remote history of bypass surgery in 2001   • History of transfusion    • Hyperglycemia    • Hyperlipidemia    • Hypertension    • Hypertensive encephalopathy    • Mental status change     Acute   • YAW on CPAP    • Pleural effusion    • Pneumonia    • RBBB (right bundle branch block)    • Screening for prostate cancer 2011   • Stroke (CMS/HCC)    • TIA (transient ischemic attack)    • Type 2 diabetes mellitus (CMS/HCC)    • Urinary retention    • Vitamin D deficiency        Past Surgical History:   Procedure Laterality Date   • APPENDECTOMY N/A 02/14/2016    Dr. Alexey Dodson   • COLONOSCOPY      over 20 years ago.  no polyps    • COLONOSCOPY N/A 9/18/2018    Procedure: COLONOSCOPY;  Surgeon: Jose Enrique Louie MD;  Location: [facility] ENDOSCOPY;  Service: Gastroenterology   • COLONOSCOPY N/A 9/19/2018    IH, EH, polyps (TAs w/low grade dysplasia)   • COLONOSCOPY N/A 6/1/2020    Procedure: COLONOSCOPY;  Surgeon: Jose Enrique Louie MD;  Location: [facility] ENDOSCOPY;  Service: Gastroenterology;  Laterality: N/A;  Pre op-Anemia, Melena, History of Polyps  Post op-To Cecum/TI, Polyp, Poor Prep   • CORONARY ARTERY BYPASS GRAFT  11/2001   • ENDOSCOPY N/A 5/29/2020    Procedure: ESOPHAGOGASTRODUODENOSCOPY with biopsies;  Surgeon: Amanda Lovelace MD;  Location: [facility]  ENDOSCOPY;  Service: Gastroenterology;  Laterality: N/A;  pre-anemia, dark stools  post-esophagitis, hiatal hernia   • ENDOSCOPY N/A 9/15/2020    Procedure: ESOPHAGOGASTRODUODENOSCOPY WITH BIOPSIES;  Surgeon: Jose Enrique Louie MD;  Location: [facility] ENDOSCOPY;  Service: Gastroenterology;  Laterality: N/A;  pre: history of melena and esophagitis  post: mild esophagitis and gastritis, small hiatal hernia   • HERNIA REPAIR Left 2019   • TONSILLECTOMY     • VASECTOMY         (Not in a hospital admission)      Current Meds  Scheduled Meds:furosemide, 40 mg, Intravenous, Q12H  sodium chloride, 10 mL, Intravenous, Q12H      Continuous Infusions:   PRN Meds:.•  acetaminophen  •  dextrose  •  dextrose  •  glucagon (human recombinant)  •  nitroglycerin  •  sodium chloride  •  [COMPLETED] Insert peripheral IV **AND** sodium chloride  •  sodium chloride    Allergies as of 2021 - Reviewed 2021   Allergen Reaction Noted   • Prozac [fluoxetine hcl] Unknown - Low Severity 2015       Social History     Socioeconomic History   • Marital status:      Spouse name: Lissette   • Number of children: 3   • Years of education: college   Tobacco Use   • Smoking status: Never Smoker   • Smokeless tobacco: Never Used   • Tobacco comment: daily caffeine   Vaping Use   • Vaping Use: Never used   Substance and Sexual Activity   • Alcohol use: No   • Drug use: No   • Sexual activity: Defer       Family History   Problem Relation Age of Onset   • Diabetes Mother    • Hypertension Mother    • Macular degeneration Mother    • Alcohol abuse Father    • Cancer Father         lung,  age 65   • Heart disease Father    • Alcohol abuse Brother    • Cirrhosis Brother          age 50   • Liver cancer Brother 45   • Diabetes Son    • Kidney failure Son    • Autism Son      Past surgical, medical, social and family history reviewed, updated and amended.    Review of Systems   Constitutional: Negative for chills and  "fever.   HENT: Negative for hoarse voice and sore throat.    Eyes: Negative for double vision and photophobia.   Cardiovascular: Positive for dyspnea on exertion and leg swelling. Negative for chest pain, near-syncope, orthopnea, palpitations, paroxysmal nocturnal dyspnea and syncope.   Respiratory: Positive for shortness of breath. Negative for cough and wheezing.    Skin: Negative for poor wound healing and rash.   Musculoskeletal: Negative for arthritis and joint swelling.   Gastrointestinal: Positive for nausea. Negative for bloating, abdominal pain, hematemesis and hematochezia.   Neurological: Negative for dizziness and focal weakness.   Psychiatric/Behavioral: Negative for depression and suicidal ideas.            Objective:   Temp:  [97.8 °F (36.6 °C)] 97.8 °F (36.6 °C)  Heart Rate:  [64-96] 74  Resp:  [18] 18  BP: (134-221)/() 157/90  Body mass index is 25.77 kg/m².  Flowsheet Rows      First Filed Value   Admission Height 182.9 cm (72\") Documented at 12/13/2021 2116   Admission Weight 86.2 kg (190 lb) Documented at 12/13/2021 2116        Vitals:    12/14/21 0700   BP:    Pulse: 74   Resp:    Temp:    SpO2: 93%       Vitals reviewed.   Constitutional:       Appearance: Healthy appearance. Not in distress.   Eyes:      Conjunctiva/sclera: Conjunctivae normal.      Pupils: Pupils are equal, round, and reactive to light.   Neck:      Vascular: No JVR. JVD normal.   Pulmonary:      Effort: Pulmonary effort is normal.      Breath sounds: No wheezing. No rhonchi. Rales present.      Comments: Absent breath sounds bibasilar zones  Chest:      Chest wall: Not tender to palpatation.   Cardiovascular:      PMI at left midclavicular line. Normal rate. Regular rhythm. Normal S1. Normal S2.      Murmurs: There is a systolic murmur.      No gallop. No click. No rub.   Pulses:     Intact distal pulses.   Edema:     Peripheral edema present.  Abdominal:      General: Bowel sounds are normal.      Palpations: Abdomen " is soft.      Tenderness: There is no abdominal tenderness.   Musculoskeletal: Normal range of motion.         General: No tenderness. Skin:     General: Skin is warm and dry.   Neurological:      General: No focal deficit present.      Mental Status: Alert and oriented to person, place and time.                 Lab Review:      Results from last 7 days   Lab Units 12/14/21  0421 12/13/21  1614 12/13/21  1614   SODIUM mmol/L 139   < > 138   POTASSIUM mmol/L 3.7   < > 4.0   CHLORIDE mmol/L 110*   < > 107   CO2 mmol/L 21.8*   < > 23.7   BUN mg/dL 24*   < > 26*   CREATININE mg/dL 2.23*   < > 2.64*   CALCIUM mg/dL 8.2*   < > 9.0   BILIRUBIN mg/dL  --   --  0.2   ALK PHOS U/L  --   --  86   ALT (SGPT) U/L  --   --  8   AST (SGOT) U/L  --   --  13   GLUCOSE mg/dL 104*   < > 190*    < > = values in this interval not displayed.         @LABRCNTbnp@  Results from last 7 days   Lab Units 12/14/21  0421 12/13/21  1614   WBC 10*3/mm3 6.52 6.11   HEMOGLOBIN g/dL 9.9* 11.7*   HEMATOCRIT % 30.2* 37.0*   PLATELETS 10*3/mm3 233 283             @LABRCNTIP(chol,trig,hdl,ldl)                    I personally viewed and interpreted the patient's EKG/Telemetry data  )  Patient Active Problem List   Diagnosis   • Major depressive disorder with single episode, in partial remission ([facility])   • Other hyperlipidemia   • Vitamin D deficiency   • Erectile dysfunction   • Chronic fatigue   • Type 2 diabetes mellitus with ophthalmic complication ([facility])   • Skin lesion of chest wall   • Slow transit constipation   • Benign prostatic hyperplasia with nocturia   • Stage 3b chronic kidney disease (HCC)   • History of basal cell carcinoma   • High blood pressure associated with diabetes ([facility])   • Acquired hypothyroidism   • Hypertensive urgency   • Recurrent strokes ([facility])   • Urinary retention   • Pneumonia   • Acute on chronic combined systolic and diastolic congestive heart failure (HCC)   • Acute respiratory failure with hypoxia ([facility])   • Suspected  sleep apnea   • Pleural effusion   • YAW (obstructive sleep apnea)   • Coronary artery disease involving native coronary artery of native heart without angina pectoris   • Chronically elevated troponin   • Colon cancer screening   • Enlarged lymph nodes   • Functional gait abnormality   • History of myocardial infarction   • Hospital discharge follow-up   • Insomnia   • Iron deficiency anemia   • Major depression, recurrent (HCC)   • Anemia, chronic renal failure, stage 3 (moderate) (HCC)   • Essential hypertension   • Adverse effect of iron and its compounds, initial encounter   • Acute on chronic renal insufficiency   • Debility   • Falls frequently   • Acute pain of right shoulder   • Acute on chronic anemia   • Heme positive stool   • Neurogenic orthostatic hypotension (HCC)   • Symptomatic anemia   • History of colon polyps   • Helicobacter pylori gastritis   • Esophagitis   • Sleep related hypoxia   • Embolic stroke (HCC)   • Patent foramen ovale   • Pleural effusion, bilateral   • Cardiomyopathy (HCC)   • Lower abdominal pain     Assessment and Plan:    1. Acute on chronic systolic heart failure -secondary medical noncompliance.  Will continue high-dose Lasix.  Will consult pulmonary for thoracentesis.  He is not getting his apixaban right now.  Uncertain when last dose was.  Will restart home lisinopril and beta-blocker.  Repeat EKG.  No angina.  Counseled at length on need for lifestyle modifications and medical compliance.  2. CAD -no angina at this time.  Will check repeat EKG.  3. Orthostatic hypotension -significant hypertension at this time.  Will check orthostatic measurements.  Was on midodrine in the past.  4. Bilateral pleural effusions -will need consideration for thoracentesis.  5. CKD -creatinine is approximately at baseline.  Will consider nephrology consult.  6. History of stroke -on apixaban for prevention.  Not certain when his last dose was.  Will hold until thoracentesis is  complete.  7. History of GI bleed  8. Uncontrolled diabetes  9. YAW on CPAP    Klever Wei MD  12/14/21  08:42 EST.  Time spent in reviewing chart, discussion and examination:

## 2021-12-14 NOTE — CONSULTS
Group: [facility]         CONSULT NOTE    Patient Identification:    Carlito Mo  66 y.o.  male  1955  [id number]            Patient Care Team:  Florencia Caballero MD as PCP - General (Internal Medicine)  Amber Murguia MD as Consulting Physician (Cardiology)  Sera La RPH as Pharmacist  Seth Ladd MD as Surgeon (General Surgery)  Kim Hoyos MD PhD as Consulting Physician (Hematology and Oncology)  Jerome Diallo MD as Referring Physician (Family Medicine)  Jose Enrique Louie MD as Consulting Physician (Gastroenterology)  Nima Huddleston MD as Consulting Physician (Nephrology)    Requesting physician: Dr. Juan    Reason for Consultation: Acute congestive heart failure, bilateral pleural effusions    CC: Shortness of breath, pedal edema    History of Present Illness: Patient is a pleasant 66-year-old white male with a complex cardiac history including CAD s/p CABG, cardiomyopathy, CKD 2, diabetes who sees Dr. Murguia in the clinic and has been reportedly noncompliant with his medications and came to the hospital complaining of nausea vomiting and abdominal discomfort and noted to be in congestive heart failure.  Patient underwent a CT of the abdomen and showed no acute intra-abdominal process but evidence of large right pleural effusion which was confirmed on the chest x-ray as well.  There is evidence of pulmonary venous congestion as well.  We have been asked to evaluate the patient and assist in the management of the pleural effusions.    Patient already has been evaluated by cardiology and started on diuretic regimen.  Nephrology is consulted due to concern for CKD.  Patient admits that he is noncompliant and states that he does not need the medications anymore as he is feeling well but otherwise does not have a good reason why he is not taking medications.  He was supposed to be on anticoagulation for stroke and he is not using it as well.    I have reviewed  the H&P as well as the notes from the other consultants taking care of the patient this admission as well as previous hospital notes (if any available) from our group physicians and summarized above      Objective     Review of Systems:  Constitutional: No fever, chills, weight loss or loss of appetite.   ENMT: No sinus congestion, post nasal drip, epistaxis, sore throat  Cardiovascular: No chest pain, palpitation. + legs swelling.    Respiratory: No hemoptysis, orthopnea, PND.  Gastrointestinal: No constipation, diarrhea or abdominal pain   Neurology: No headache, focal weakness, numbness or dizziness.   Musculoskeletal: No joint stiffness or swelling.   Psychiatry: No agitation or behavioral changes  Lymphatic: No swollen neck glands.  Integumentary: No rash.    Past Medical History:  Past Medical History:   Diagnosis Date   • Acquired hypothyroidism    • Acute congestive heart failure (HCC)    • Acute respiratory failure with hypoxia (HCC)    • Acute sinusitis    • SYLVAIN (acute kidney injury) (HCC)     on CKD   • Anemia    • Arthritis    • CAD (coronary artery disease)    • Cancer (HCC)     skin   • Chronic combined systolic and diastolic congestive heart failure (HCC)    • Chronic ischemic heart disease    • CKD (chronic kidney disease)    • COPD (chronic obstructive pulmonary disease) (HCC)    • Depression    • Diabetes mellitus type 2, uncontrolled, without complications    • Disease of thyroid gland    • Fatigue    • Frequent PVCs    • Heart attack (HCC)    • History of coronary artery disease     with remote history of bypass surgery in 2001   • History of transfusion    • Hyperglycemia    • Hyperlipidemia    • Hypertension    • Hypertensive encephalopathy    • Mental status change     Acute   • YAW on CPAP    • Pleural effusion    • Pneumonia    • RBBB (right bundle branch block)    • Screening for prostate cancer 2011   • Stroke (HCC)    • TIA (transient ischemic attack)    • Type 2 diabetes mellitus (HCC)     • Urinary retention    • Vitamin D deficiency        Past Surgical History:  Past Surgical History:   Procedure Laterality Date   • APPENDECTOMY N/A 02/14/2016    Dr. Alexey Dodson   • COLONOSCOPY      over 20 years ago.  no polyps    • COLONOSCOPY N/A 9/18/2018    Procedure: COLONOSCOPY;  Surgeon: Jose Enrique Louie MD;  Location: [facility] ENDOSCOPY;  Service: Gastroenterology   • COLONOSCOPY N/A 9/19/2018    IH, EH, polyps (TAs w/low grade dysplasia)   • COLONOSCOPY N/A 6/1/2020    Procedure: COLONOSCOPY;  Surgeon: Jose Enrique Louie MD;  Location: [facility] ENDOSCOPY;  Service: Gastroenterology;  Laterality: N/A;  Pre op-Anemia, Melena, History of Polyps  Post op-To Cecum/TI, Polyp, Poor Prep   • CORONARY ARTERY BYPASS GRAFT  11/2001   • ENDOSCOPY N/A 5/29/2020    Procedure: ESOPHAGOGASTRODUODENOSCOPY with biopsies;  Surgeon: Amanda Lovelace MD;  Location: [facility] ENDOSCOPY;  Service: Gastroenterology;  Laterality: N/A;  pre-anemia, dark stools  post-esophagitis, hiatal hernia   • ENDOSCOPY N/A 9/15/2020    Procedure: ESOPHAGOGASTRODUODENOSCOPY WITH BIOPSIES;  Surgeon: Jose Enrique Louie MD;  Location: [facility] ENDOSCOPY;  Service: Gastroenterology;  Laterality: N/A;  pre: history of melena and esophagitis  post: mild esophagitis and gastritis, small hiatal hernia   • HERNIA REPAIR Left 12/05/2019   • TONSILLECTOMY     • VASECTOMY          Home Meds:  Medications Prior to Admission   Medication Sig Dispense Refill Last Dose   • amLODIPine (NORVASC) 5 MG tablet Take 5 mg by mouth Daily.   Past Week at Unknown time   • aspirin 81 MG EC tablet Take 81 mg by mouth Daily.   Past Week at Unknown time   • buPROPion XL (WELLBUTRIN XL) 150 MG 24 hr tablet TAKE ONE TABLET BY MOUTH EVERY MORNING   Past Week at Unknown time   • carvedilol (COREG) 25 MG tablet Take 1 tablet by mouth 2 (Two) Times a Day With Meals. 60 tablet 0 Past Week at Unknown time   • Cholecalciferol (VITAMIN D3) 5000 units capsule capsule Take  5,000 Units by mouth Daily.   Past Month at Unknown time   • docusate sodium 100 MG capsule Take 100 mg by mouth.   Past Month at Unknown time   • ferrous sulfate 325 (65 FE) MG tablet Take 1 tablet by mouth Daily With Breakfast & Dinner. 180 tablet 3 Past Week at Unknown time   • folic acid (FOLVITE) 1 MG tablet TAKE ONE TABLET BY MOUTH DAILY 205 tablet 0 Past Week at Unknown time   • furosemide (LASIX) 40 MG tablet Take 1 tablet by mouth Daily.   Past Week at Unknown time   • levothyroxine (SYNTHROID, LEVOTHROID) 75 MCG tablet TAKE ONE TABLET BY MOUTH DAILY   Past Week at Unknown time   • lisinopril (PRINIVIL,ZESTRIL) 10 MG tablet Take 2 tablets by mouth Daily.   Past Week at Unknown time   • tamsulosin (FLOMAX) 0.4 MG capsule 24 hr capsule Take 1 capsule by mouth Every Night.   Past Week at Unknown time   • vitamin B-12 (CYANOCOBALAMIN) 1000 MCG tablet TAKE ONE TABLET BY MOUTH DAILY 205 tablet 0 Past Week at Unknown time   • acetaminophen (TYLENOL) 325 MG tablet Take 2 tablets by mouth Every 4 (Four) Hours As Needed for Mild Pain  or Fever (Temperature greater than or equal to 37 C).   More than a month at Unknown time   • apixaban (ELIQUIS) 5 MG tablet tablet Take 1 tablet by mouth Every 12 (Twelve) Hours. Indications: Other - full anticoagulation 60 tablet 0 More than a month at Unknown time   • atorvastatin (LIPITOR) 80 MG tablet Take 1 tablet by mouth Every Night. 30 tablet 0    • Insulin Glargine (BASAGLAR KWIKPEN) 100 UNIT/ML injection pen Inject 4 Units under the skin into the appropriate area as directed Every Night. Patient says he does not take consistently      • insulin glulisine (APIDRA) 100 UNIT/ML injection Inject 2 Units under the skin into the appropriate area as directed 3 (Three) Times a Day Before Meals. Patient says he does not take consistently      • melatonin 3 MG tablet Take 1 tablet by mouth Every Night. 30 tablet 0        Allergies:  Allergies   Allergen Reactions   • Prozac [Fluoxetine  "Hcl] Unknown - Low Severity     shaking       Social History:   Social History     Socioeconomic History   • Marital status:      Spouse name: Lissette   • Number of children: 3   • Years of education: college   Tobacco Use   • Smoking status: Never Smoker   • Smokeless tobacco: Never Used   • Tobacco comment: daily caffeine   Vaping Use   • Vaping Use: Never used   Substance and Sexual Activity   • Alcohol use: No   • Drug use: No   • Sexual activity: Defer       Family History:  Family History   Problem Relation Age of Onset   • Diabetes Mother    • Hypertension Mother    • Macular degeneration Mother    • Alcohol abuse Father    • Cancer Father         lung,  age 65   • Heart disease Father    • Alcohol abuse Brother    • Cirrhosis Brother          age 50   • Liver cancer Brother 45   • Diabetes Son    • Kidney failure Son    • Autism Son        Physical Exam:  /94 (BP Location: Right arm, Patient Position: Lying)   Pulse 73   Temp 97.3 °F (36.3 °C) (Oral)   Resp 18   Ht 182.9 cm (72\")   Wt 86.2 kg (190 lb)   SpO2 95%   BMI 25.77 kg/m²  Body mass index is 25.77 kg/m². 95% 86.2 kg (190 lb)    Physical Exam     Constitutional: Middle aged pt in bed, No acute respiratory distress, + accessory muscle use  Head: - NCAT  Eyes: No pallor, Anicteric conjunctiva, EOMI.  ENMT:  Mallampati 3, no oral thrush. Moist MM.   NECK: Trachea midline, No thyromegaly, no palpable cervical LNpathy  Heart: RRR, no murmur. 2+ pedal edema   Lungs: HAYDEN +, decreased breath sound at the bases.  No wheezes/ crackles heard    Abdomen: Soft. No tenderness, guarding or rigidity. No palpable masses  Extremities: Extremities warm and well perfused. No cyanosis/ clubbing  Neuro: Conscious, answers appropriately, no gross focal neuro deficits  Psych: Mood and affect appropriate    PPE recommended per [facility] infectious disease Isolation protocol for the current clinical scenario(as mentioned below) was " followed.     LABS:  Results from last 7 days   Lab Units 12/14/21  0421 12/13/21  1614   SODIUM mmol/L 139 138   POTASSIUM mmol/L 3.7 4.0   CHLORIDE mmol/L 110* 107   CO2 mmol/L 21.8* 23.7   BUN mg/dL 24* 26*   CREATININE mg/dL 2.23* 2.64*   CALCIUM mg/dL 8.2* 9.0   BILIRUBIN mg/dL  --  0.2   ALK PHOS U/L  --  86   ALT (SGPT) U/L  --  8   AST (SGOT) U/L  --  13   GLUCOSE mg/dL 104* 190*   WBC 10*3/mm3 6.52 6.11   HEMOGLOBIN g/dL 9.9* 11.7*   PLATELETS 10*3/mm3 233 283   PROBNP pg/mL  --  7,027.0*       Lab Results   Component Value Date    CALCIUM 8.2 (L) 12/14/2021    PHOS 3.4 06/04/2020                    Results Review:    I have reviewed the relevant laboratory results and independently reviewed the chest imaging from this hospitalization including the available echocardiogram reports personally and summarized it if/ when appropriate below    Assessment   Bilateral- Rt>Lt pl effusion   Acute on chronic systolic heart failure  Medical noncompliance  Previous CVA on Eliquis; noncompliant  CKD  CAD s/p prior set CABG  Previous GI bleed  YAW on CPAP    RECOMMENDATIONS:  Patient's pleural effusions are likely from congestive heart failure in setting of medical noncompliance with diuretics and medications.  Low clinical concern for pneumonia or need for antibiotics currently.  Agree with current treatment plan from cardiology including diuretics.  Will order right-sided ultrasound-guided thoracentesis.  Noted patient is not taking his anticoagulant and would be okay to restart postprocedure  Agree with nephrology evaluation for CKD.  Full code    Guarded prognosis    Thank you for letting me participate in the care of this pleasant patient.  I have discussed my findings and recommendations with patient.     Jorge Shukla MD  12/14/2021  17:17 EST

## 2021-12-14 NOTE — ED NOTES
Nursing report ED to floor  Carlito Mo  66 y.o.  male    HPI :   Chief Complaint   Patient presents with   • Abdominal Pain   • Hyperglycemia     patient hasnt taking meds   • Nausea   • Vomiting   • Cough       Admitting doctor:   Emily Juan DO    Admitting diagnosis:   The primary encounter diagnosis was Lower abdominal pain. Diagnoses of Mild shortness of breath, Elevated brain natriuretic peptide (BNP) level, Stage 3 chronic kidney disease, unspecified whether stage 3a or 3b CKD (HCC), and Pleural effusion were also pertinent to this visit.    Code status:   Current Code Status     Date Active Code Status Order ID Comments User Context       12/14/2021 0045 CPR (Attempt to Resuscitate) [id number]  Maya Archibald, APRN ED     Advance Care Planning Activity      Questions for Current Code Status     Question Answer    Code Status (Patient has no pulse and is not breathing) CPR (Attempt to Resuscitate)    Medical Interventions (Patient has pulse or is breathing) Full Support          Allergies:   Prozac [fluoxetine hcl]    Intake and Output    Intake/Output Summary (Last 24 hours) at 12/14/2021 1212  Last data filed at 12/14/2021 1210  Gross per 24 hour   Intake 500 ml   Output 500 ml   Net 0 ml       Weight:       12/13/21 2116   Weight: 86.2 kg (190 lb)       Most recent vitals:   Vitals:    12/14/21 0916 12/14/21 0923 12/14/21 1116 12/14/21 1121   BP: 156/87  151/91 152/86   Pulse: 74 77 100 69   Resp:       Temp:       SpO2: 93% 92% 96% 91%   Weight:       Height:           Active LDAs/IV Access:   Lines, Drains & Airways     Active LDAs     Name Placement date Placement time Site Days    Peripheral IV 12/13/21 2119 Left Antecubital 12/13/21 2119  Antecubital  less than 1                Labs (abnormal labs have a star):   Labs Reviewed   COMPREHENSIVE METABOLIC PANEL - Abnormal; Notable for the following components:       Result Value    Glucose 190 (*)     BUN 26 (*)     Creatinine 2.64 (*)      Total Protein 5.9 (*)     Albumin 3.10 (*)     eGFR Non  Amer 24 (*)     All other components within normal limits    Narrative:     GFR Normal >60  Chronic Kidney Disease <60  Kidney Failure <15     URINALYSIS W/ MICROSCOPIC IF INDICATED (NO CULTURE) - Abnormal; Notable for the following components:    Glucose,  mg/dL (2+) (*)     Blood, UA Moderate (2+) (*)     Protein, UA >=300 mg/dL (3+) (*)     All other components within normal limits   CBC WITH AUTO DIFFERENTIAL - Abnormal; Notable for the following components:    Hemoglobin 11.7 (*)     Hematocrit 37.0 (*)     Lymphocyte % 15.9 (*)     All other components within normal limits   URINALYSIS, MICROSCOPIC ONLY - Abnormal; Notable for the following components:    RBC, UA 6-12 (*)     WBC, UA 3-5 (*)     All other components within normal limits   BNP (IN-HOUSE) - Abnormal; Notable for the following components:    proBNP 7,027.0 (*)     All other components within normal limits    Narrative:     Among patients with dyspnea, NT-proBNP is highly sensitive for the detection of acute congestive heart failure. In addition NT-proBNP of <300 pg/ml effectively rules out acute congestive heart failure with 99% negative predictive value.    Results may be falsely decreased if patient taking Biotin.     BASIC METABOLIC PANEL - Abnormal; Notable for the following components:    Glucose 104 (*)     BUN 24 (*)     Creatinine 2.23 (*)     Chloride 110 (*)     CO2 21.8 (*)     Calcium 8.2 (*)     eGFR Non  Amer 30 (*)     All other components within normal limits    Narrative:     GFR Normal >60  Chronic Kidney Disease <60  Kidney Failure <15     CBC WITH AUTO DIFFERENTIAL - Abnormal; Notable for the following components:    RBC 3.70 (*)     Hemoglobin 9.9 (*)     Hematocrit 30.2 (*)     All other components within normal limits   COVID-19,[facility] IN-HOUSE CEPHEID/ERROL, NP SWAB IN TRANSPORT MEDIA 8-12 HR TAT - Normal    Narrative:     Fact sheet for providers:  https://www.fda.gov/media/470144/download    Fact sheet for patients: https://www.fda.gov/media/280659/download    Test performed by PCR.   LIPASE - Normal   COVID PRE-OP / PRE-PROCEDURE SCREENING ORDER (NO ISOLATION)    Narrative:     The following orders were created for panel order COVID PRE-OP / PRE-PROCEDURE SCREENING ORDER (NO ISOLATION) - Swab, Nasopharynx.  Procedure                               Abnormality         Status                     ---------                               -----------         ------                     COVID-19,[facility] IN-HOUSE...[[id number]]  Normal              Final result                 Please view results for these tests on the individual orders.   GASTROINTESTINAL PANEL, PCR   RAINBOW DRAW    Narrative:     The following orders were created for panel order [place] Draw.  Procedure                               Abnormality         Status                     ---------                               -----------         ------                     Green Top (Gel)[[id number]]                                  Final result               Lavender Top[[id number]]                                     Final result               Gold Top - SST[[id number]]                                   Final result               Light Blue Top[[id number]]                                   Final result                 Please view results for these tests on the individual orders.   POCT GLUCOSE FINGERSTICK   POCT GLUCOSE FINGERSTICK   POCT GLUCOSE FINGERSTICK   POCT GLUCOSE FINGERSTICK   POCT GLUCOSE FINGERSTICK   POCT GLUCOSE FINGERSTICK   CBC AND DIFFERENTIAL    Narrative:     The following orders were created for panel order CBC & Differential.  Procedure                               Abnormality         Status                     ---------                               -----------         ------                     CBC Auto Differential[[id number]]        Abnormal            Final result                  Please view results for these tests on the individual orders.   GREEN TOP   LAVENDER TOP   GOLD TOP - SST   LIGHT BLUE TOP   CBC AND DIFFERENTIAL    Narrative:     The following orders were created for panel order CBC & Differential.  Procedure                               Abnormality         Status                     ---------                               -----------         ------                     CBC Auto Differential[[id number]]        Abnormal            Final result                 Please view results for these tests on the individual orders.       EKG:   No orders to display       Meds given in ED:   Medications   sodium chloride 0.9 % flush 10 mL (has no administration in time range)   sodium chloride 0.9 % flush 10 mL (has no administration in time range)   sodium chloride 0.9 % flush 10 mL (10 mL Intravenous Given 12/14/21 1117)   sodium chloride 0.9 % flush 10 mL (has no administration in time range)   dextrose (GLUTOSE) oral gel 15 g (has no administration in time range)   dextrose (D50W) (25 g/50 mL) IV injection 25 g (has no administration in time range)   glucagon (human recombinant) (GLUCAGEN DIAGNOSTIC) injection 1 mg (has no administration in time range)   nitroglycerin (NITROSTAT) SL tablet 0.4 mg (has no administration in time range)   acetaminophen (TYLENOL) tablet 650 mg (has no administration in time range)   furosemide (LASIX) injection 40 mg (40 mg Intravenous Given 12/14/21 0741)   sodium chloride 0.9 % bolus 500 mL (0 mL Intravenous Stopped 12/13/21 2236)   ondansetron (ZOFRAN) injection 4 mg (4 mg Intravenous Given 12/13/21 2146)   lisinopril (PRINIVIL,ZESTRIL) tablet 40 mg (40 mg Oral Given 12/13/21 2226)   amLODIPine (NORVASC) tablet 10 mg (10 mg Oral Given 12/13/21 2226)       Imaging results:  CT Abdomen Pelvis Without Contrast    Result Date: 12/13/2021   1. No acute intra-abdominal or intrapelvic process seen. 2. Large right pleural effusion and moderate to large left  pleural effusion.  Radiation dose reduction techniques were utilized, including automated exposure control and exposure modulation based on body size.  This report was finalized on 12/13/2021 9:59 PM by Dr. Alice Allen M.D.      XR Chest 1 View    Result Date: 12/14/2021  As above.  This report was finalized on 12/14/2021 12:09 AM by Dr. Alice Allen M.D.        Ambulatory status:   - with assist     Social issues:   Social History     Socioeconomic History   • Marital status:      Spouse name: Lissette   • Number of children: 3   • Years of education: college   Tobacco Use   • Smoking status: Never Smoker   • Smokeless tobacco: Never Used   • Tobacco comment: daily caffeine   Vaping Use   • Vaping Use: Never used   Substance and Sexual Activity   • Alcohol use: No   • Drug use: No   • Sexual activity: Defer       NIH Stroke Scale:        Nursing report ED to floor:       Julia Hagen RN  12/14/21 1211

## 2021-12-14 NOTE — ED PROVIDER NOTES
EMERGENCY DEPARTMENT ENCOUNTER    Room Number:  01/01  Date seen:  12/13/2021  Time seen: 21:25 EST  PCP: Florencia Caballero MD  Historian: patient    HPI:  Chief complaint:n/v x 1, abdominal pain  A complete HPI/ROS/PMH/PSH/SH/FH are unobtainable due to: n/a  Context:Carlito Mo is a 66 y.o. male who presents to the ED with c/o one episode of n/v with some abdominal pain after eating a [employer] hamburger.  He states n/v is gone as is abdominal pain and it was made better by vomiting.  He has some elevated BP and has been out of his lasix.  He has CKD stage III and his nephrologist is Dr. Huddleston.     Patient was placed in face mask in first look. Patient was wearing facemask when I entered the room and throughout our encounter. I wore full protective equipment throughout this patient encounter including a N95 face mask, eye shield and gloves. Hand hygiene/washing of hands was performed before donning protective equipment and after removal when leaving the room.    MEDICAL RECORD REVIEW    ALLERGIES  Prozac [fluoxetine hcl]    PAST MEDICAL HISTORY  Active Ambulatory Problems     Diagnosis Date Noted   • Major depressive disorder with single episode, in partial remission (HCC)    • Other hyperlipidemia    • Vitamin D deficiency 12/17/2015   • Erectile dysfunction 12/17/2015   • Chronic fatigue 04/25/2016   • Type 2 diabetes mellitus with ophthalmic complication ([facility]) 04/25/2016   • Skin lesion of chest wall 06/20/2016   • Slow transit constipation 09/01/2016   • Benign prostatic hyperplasia with nocturia 12/20/2016   • Stage 3b chronic kidney disease (HCC) 12/20/2016   • History of basal cell carcinoma 12/20/2016   • High blood pressure associated with diabetes ([facility]) 12/20/2016   • Acquired hypothyroidism 12/20/2017   • Recurrent strokes ([facility]) 02/20/2018   • Urinary retention 09/20/2018   • Pneumonia 09/21/2018   • Acute on chronic combined systolic and diastolic congestive heart failure (HCC) 09/21/2018   • Acute  respiratory failure with hypoxia ([facility]) 09/21/2018   • Suspected sleep apnea 10/29/2018   • Pleural effusion 12/01/2018   • YAW (obstructive sleep apnea) 12/13/2018   • Coronary artery disease involving native coronary artery of native heart without angina pectoris 04/18/2019   • Chronically elevated troponin 07/02/2019   • Colon cancer screening 10/30/2018   • Enlarged lymph nodes 10/30/2018   • Functional gait abnormality 10/30/2018   • History of myocardial infarction 02/06/2019   • Hospital discharge follow-up 05/31/2019   • Insomnia 02/06/2019   • Iron deficiency anemia 10/02/2018   • Major depression, recurrent ([facility]) 10/16/2012   • Anemia, chronic renal failure, stage 3 (moderate) ([facility]) 02/27/2020   • Essential hypertension 03/10/2020   • Adverse effect of iron and its compounds, initial encounter 05/23/2020   • Acute on chronic renal insufficiency 05/25/2020   • Debility 05/25/2020   • Falls frequently 05/25/2020   • Acute pain of right shoulder 05/27/2020   • Acute on chronic anemia 05/25/2020   • Heme positive stool 05/25/2020   • Neurogenic orthostatic hypotension (HCC) 05/30/2020   • Symptomatic anemia 05/25/2020   • History of colon polyps 05/25/2020   • Helicobacter pylori gastritis 06/04/2020   • Esophagitis 06/10/2020   • Sleep related hypoxia 07/23/2020   • Embolic stroke ([facility]) 05/20/2021   • Patent foramen ovale 05/22/2021   • Pleural effusion 05/22/2021   • Cardiomyopathy ([facility]) 05/24/2021     Resolved Ambulatory Problems     Diagnosis Date Noted   • Anemia    • Arthritis    • Diabetes mellitus out of control ([facility])    • Type 2 diabetes mellitus ([facility])    • Acute sinusitis    • Accumulation of fluid in tissues 12/17/2015   • Fatigue 12/17/2015   • Cardiomyopathy, ischemic 12/17/2015   • Acute appendicitis 03/02/2016   • Atherosclerosis of coronary artery 10/16/2012   • Altered mental status 11/30/2017   • Hypertensive urgency 02/16/2018   • Hypertensive encephalopathy syndrome 04/30/2018   • History of  medication noncompliance 05/04/2018   • Hyperglycemia 05/04/2018   • Screening for colorectal cancer 08/22/2018   • Constipation 08/22/2018   • SYLVAIN (acute kidney injury) (HCC) 09/20/2018   • Snoring 12/13/2018   • Lower abdominal pain 05/27/2020     Past Medical History:   Diagnosis Date   • Acute congestive heart failure (CMS/HCC)    • CAD (coronary artery disease)    • Cancer (CMS/HCC)    • Chronic combined systolic and diastolic congestive heart failure (CMS/HCC)    • Chronic ischemic heart disease    • CKD (chronic kidney disease)    • COPD (chronic obstructive pulmonary disease) (CMS/HCC)    • Depression    • Diabetes mellitus type 2, uncontrolled, without complications    • Disease of thyroid gland    • Frequent PVCs    • Heart attack (CMS/HCC)    • History of coronary artery disease    • History of transfusion    • Hyperlipidemia    • Hypertension    • Hypertensive encephalopathy    • Mental status change    • YAW on CPAP    • RBBB (right bundle branch block)    • Screening for prostate cancer 2011   • Stroke (CMS/HCC)    • TIA (transient ischemic attack)        PAST SURGICAL HISTORY  Past Surgical History:   Procedure Laterality Date   • APPENDECTOMY N/A 02/14/2016    Dr. Alexey Dodson   • COLONOSCOPY      over 20 years ago.  no polyps    • COLONOSCOPY N/A 9/18/2018    Procedure: COLONOSCOPY;  Surgeon: Jose Enrique Louie MD;  Location: [facility] ENDOSCOPY;  Service: Gastroenterology   • COLONOSCOPY N/A 9/19/2018    IH, EH, polyps (TAs w/low grade dysplasia)   • COLONOSCOPY N/A 6/1/2020    Procedure: COLONOSCOPY;  Surgeon: Jose Enrique Louie MD;  Location: [facility] ENDOSCOPY;  Service: Gastroenterology;  Laterality: N/A;  Pre op-Anemia, Melena, History of Polyps  Post op-To Cecum/TI, Polyp, Poor Prep   • CORONARY ARTERY BYPASS GRAFT  11/2001   • ENDOSCOPY N/A 5/29/2020    Procedure: ESOPHAGOGASTRODUODENOSCOPY with biopsies;  Surgeon: Amanda Lovelace MD;  Location: [facility] ENDOSCOPY;  Service:  Gastroenterology;  Laterality: N/A;  pre-anemia, dark stools  post-esophagitis, hiatal hernia   • ENDOSCOPY N/A 9/15/2020    Procedure: ESOPHAGOGASTRODUODENOSCOPY WITH BIOPSIES;  Surgeon: Jose Enrique Louie MD;  Location: [facility] ENDOSCOPY;  Service: Gastroenterology;  Laterality: N/A;  pre: history of melena and esophagitis  post: mild esophagitis and gastritis, small hiatal hernia   • HERNIA REPAIR Left 2019   • TONSILLECTOMY     • VASECTOMY         FAMILY HISTORY  Family History   Problem Relation Age of Onset   • Diabetes Mother    • Hypertension Mother    • Macular degeneration Mother    • Alcohol abuse Father    • Cancer Father         lung,  age 65   • Heart disease Father    • Alcohol abuse Brother    • Cirrhosis Brother          age 50   • Liver cancer Brother 45   • Diabetes Son    • Kidney failure Son    • Autism Son        SOCIAL HISTORY  Social History     Socioeconomic History   • Marital status:      Spouse name: Lissette   • Number of children: 3   • Years of education: college   Tobacco Use   • Smoking status: Never Smoker   • Smokeless tobacco: Never Used   • Tobacco comment: daily caffeine   Vaping Use   • Vaping Use: Never used   Substance and Sexual Activity   • Alcohol use: No   • Drug use: No   • Sexual activity: Defer       REVIEW OF SYSTEMS  Review of Systems    All systems reviewed and negative except for those discussed in HPI.     PHYSICAL EXAM    ED Triage Vitals [21 1525]   Temp Heart Rate Resp BP SpO2   97.8 °F (36.6 °C) 84 18 150/96 97 %      Temp src Heart Rate Source Patient Position BP Location FiO2 (%)   -- -- -- -- --     Physical Exam    I have reviewed the triage vital signs and nursing notes.      GENERAL: not distressed, disheveled and chronically ill appearing.   HENT: nares patent, mm moist  EYES: no scleral icterus  NECK: no ROM limitations  CV: regular rhythm, regular rate, ? Murmur, no rubs, no gallups  RESPIRATORY: normal effort,  diminished in bases  ABDOMEN: soft, mild lower abdominal tenderness  : deferred  MUSCULOSKELETAL: no deformity, 3+ pitting edema to BLE's.   NEURO: alert, moves all extremities, follows commands  SKIN: warm, dry    LAB RESULTS  Recent Results (from the past 24 hour(s))   Comprehensive Metabolic Panel    Collection Time: 12/13/21  4:14 PM    Specimen: Blood   Result Value Ref Range    Glucose 190 (H) 65 - 99 mg/dL    BUN 26 (H) 8 - 23 mg/dL    Creatinine 2.64 (H) 0.76 - 1.27 mg/dL    Sodium 138 136 - 145 mmol/L    Potassium 4.0 3.5 - 5.2 mmol/L    Chloride 107 98 - 107 mmol/L    CO2 23.7 22.0 - 29.0 mmol/L    Calcium 9.0 8.6 - 10.5 mg/dL    Total Protein 5.9 (L) 6.0 - 8.5 g/dL    Albumin 3.10 (L) 3.50 - 5.20 g/dL    ALT (SGPT) 8 1 - 41 U/L    AST (SGOT) 13 1 - 40 U/L    Alkaline Phosphatase 86 39 - 117 U/L    Total Bilirubin 0.2 0.0 - 1.2 mg/dL    eGFR Non African Amer 24 (L) >60 mL/min/1.73    Globulin 2.8 gm/dL    A/G Ratio 1.1 g/dL    BUN/Creatinine Ratio 9.8 7.0 - 25.0    Anion Gap 7.3 5.0 - 15.0 mmol/L   Lipase    Collection Time: 12/13/21  4:14 PM    Specimen: Blood   Result Value Ref Range    Lipase 33 13 - 60 U/L   Green Top (Gel)    Collection Time: 12/13/21  4:14 PM   Result Value Ref Range    Extra Tube Hold for add-ons.    Lavender Top    Collection Time: 12/13/21  4:14 PM   Result Value Ref Range    Extra Tube hold for add-on    Gold Top - SST    Collection Time: 12/13/21  4:14 PM   Result Value Ref Range    Extra Tube Hold for add-ons.    Light Blue Top    Collection Time: 12/13/21  4:14 PM   Result Value Ref Range    Extra Tube hold for add-on    CBC Auto Differential    Collection Time: 12/13/21  4:14 PM    Specimen: Blood   Result Value Ref Range    WBC 6.11 3.40 - 10.80 10*3/mm3    RBC 4.39 4.14 - 5.80 10*6/mm3    Hemoglobin 11.7 (L) 13.0 - 17.7 g/dL    Hematocrit 37.0 (L) 37.5 - 51.0 %    MCV 84.3 79.0 - 97.0 fL    MCH 26.7 26.6 - 33.0 pg    MCHC 31.6 31.5 - 35.7 g/dL    RDW 13.2 12.3 - 15.4 %     RDW-SD 40.7 37.0 - 54.0 fl    MPV 10.1 6.0 - 12.0 fL    Platelets 283 140 - 450 10*3/mm3    Neutrophil % 72.5 42.7 - 76.0 %    Lymphocyte % 15.9 (L) 19.6 - 45.3 %    Monocyte % 7.2 5.0 - 12.0 %    Eosinophil % 3.8 0.3 - 6.2 %    Basophil % 0.3 0.0 - 1.5 %    Immature Grans % 0.3 0.0 - 0.5 %    Neutrophils, Absolute 4.43 1.70 - 7.00 10*3/mm3    Lymphocytes, Absolute 0.97 0.70 - 3.10 10*3/mm3    Monocytes, Absolute 0.44 0.10 - 0.90 10*3/mm3    Eosinophils, Absolute 0.23 0.00 - 0.40 10*3/mm3    Basophils, Absolute 0.02 0.00 - 0.20 10*3/mm3    Immature Grans, Absolute 0.02 0.00 - 0.05 10*3/mm3    nRBC 0.0 0.0 - 0.2 /100 WBC   BNP    Collection Time: 12/13/21  4:14 PM    Specimen: Blood   Result Value Ref Range    proBNP 7,027.0 (H) 0.0 - 900.0 pg/mL   Urinalysis With Microscopic If Indicated (No Culture) - Urine, Clean Catch    Collection Time: 12/13/21  4:28 PM    Specimen: Urine, Clean Catch   Result Value Ref Range    Color, UA Yellow Yellow, Straw    Appearance, UA Clear Clear    pH, UA 5.5 5.0 - 8.0    Specific Gravity, UA 1.025 1.005 - 1.030    Glucose,  mg/dL (2+) (A) Negative    Ketones, UA Negative Negative    Bilirubin, UA Negative Negative    Blood, UA Moderate (2+) (A) Negative    Protein, UA >=300 mg/dL (3+) (A) Negative    Leuk Esterase, UA Negative Negative    Nitrite, UA Negative Negative    Urobilinogen, UA 0.2 E.U./dL 0.2 - 1.0 E.U./dL   Urinalysis, Microscopic Only - Urine, Clean Catch    Collection Time: 12/13/21  4:28 PM    Specimen: Urine, Clean Catch   Result Value Ref Range    RBC, UA 6-12 (A) None Seen, 0-2 /HPF    WBC, UA 3-5 (A) None Seen, 0-2 /HPF    Bacteria, UA None Seen None Seen /HPF    Squamous Epithelial Cells, UA None Seen None Seen, 0-2 /HPF    Hyaline Casts, UA 7-12 None Seen /LPF    Methodology Manual Light Microscopy          RADIOLOGY RESULTS  CT Abdomen Pelvis Without Contrast    Result Date: 12/13/2021  CT OF THE ABDOMEN AND PELVIS WITHOUT CONTRAST  HISTORY: Mid abdominal  pain. Nausea and vomiting.  COMPARISON: 12/01/2017  TECHNIQUE: Axial CT imaging was obtained through the abdomen and pelvis. No IV contrast was administered.  FINDINGS: Images through the lung bases straight bibasilar atelectasis. The patient has a large right pleural effusion and moderate to large left pleural effusion. The stomach and duodenum are unremarkable. No focal hepatic lesions are seen. Adrenal glands and spleen, pancreas, and gallbladder are grossly unremarkable. There is significant respiratory artifact. No hydronephrosis is seen on either side. No urinary stones are seen. There is calcification of the aorta. Prostate gland is enlarged and protrudes into the base of the bladder. Urinary bladder is otherwise unremarkable. There is no bowel obstruction. Appendix is surgically absent. There is body wall edema. No acute osseous abnormalities are seen.       1. No acute intra-abdominal or intrapelvic process seen. 2. Large right pleural effusion and moderate to large left pleural effusion.  Radiation dose reduction techniques were utilized, including automated exposure control and exposure modulation based on body size.  This report was finalized on 12/13/2021 9:59 PM by Dr. Alice Allen M.D.           PROGRESS, DATA ANALYSIS, CONSULTS AND MEDICAL DECISION MAKING  All labs have been independently reviewed by me.  All radiology studies have been reviewed by me and discussed with radiologist dictating the report.  EKG's independently viewed and interpreted by me unless stated otherwise. Discussion below represents my analysis of pertinent findings related to patient's condition, differential diagnosis, treatment plan and final disposition.     ED Course as of 12/13/21 2330   Mon Dec 13, 2021   2132 Blood, UA(!): Moderate (2+) [EW]   2132 BUN(!): 26 [EW]   2132 Creatinine(!): 2.64  Creatinine was 2.47 [EW]   2132  recently [EW]   2256 I discussed workup with patient.      MDM/dispo:  Abdominal images on CT  "scan are not acute.  He does have large right sided and moderate left sided pleural effusion and has been out of many of his home medications.  He has h/o CHF with elevated BNP today.  He agrees with my offer of admission to help diurese him and get him back on his home meds.  I discussed admission with JI Priest on-call for A and he agrees to admit to Dr. Marie. [EW]      ED Course User Index  [EW] Katrin Aguilar APRN     DDX: Differential diagnosis includes but is not limited to:  - hepatobiliary pathology such as cholecystitis, cholangitis, and symptomatic cholelithiasis  - Pancreatitis  - Dyspepsia  - Small bowel obstruction  - Appendicitis  - Diverticulitis  - UTI including pyelonephritis  - Ureteral stone  - Zoster  - Colitis, including infectious and ischemic  - Atypical ACS    MDM: as above in ED course    Reviewed pt's history and workup with Dr. Marquez.  After a bedside evaluation, Dr. Marquez agrees with the plan of care.    Based on the patient's lab findings and presenting symptoms, the doctor and I feel it is appropriate to admit the patient for further management, evaluation, and treatment.  I have discussed this with the admitting team.  I have also discussed this with the patient/family.  They are in agreement with admission.          Disposition vitals:  BP (!) 199/114   Pulse 86   Temp 97.8 °F (36.6 °C)   Resp 18   Ht 182.9 cm (72\")   Wt 86.2 kg (190 lb)   SpO2 97%   BMI 25.77 kg/m²       DIAGNOSIS  Final diagnoses:   Lower abdominal pain   Mild shortness of breath   Elevated brain natriuretic peptide (BNP) level   Stage 3 chronic kidney disease, unspecified whether stage 3a or 3b CKD (HCC)   Pleural effusion       Admission     Katrin Aguilar APRN  12/13/21 2330    "

## 2021-12-15 ENCOUNTER — APPOINTMENT (OUTPATIENT)
Dept: GENERAL RADIOLOGY | Facility: HOSPITAL | Age: 66
End: 2021-12-15

## 2021-12-15 ENCOUNTER — APPOINTMENT (OUTPATIENT)
Dept: ULTRASOUND IMAGING | Facility: HOSPITAL | Age: 66
End: 2021-12-15

## 2021-12-15 LAB
25(OH)D3 SERPL-MCNC: 15 NG/ML (ref 30–100)
AMYLASE FLD-CCNC: 18 U/L
ANION GAP SERPL CALCULATED.3IONS-SCNC: 8.9 MMOL/L (ref 5–15)
APPEARANCE FLD: CLEAR
BUN SERPL-MCNC: 29 MG/DL (ref 8–23)
BUN/CREAT SERPL: 10.9 (ref 7–25)
CALCIUM SPEC-SCNC: 8.1 MG/DL (ref 8.6–10.5)
CHLORIDE SERPL-SCNC: 109 MMOL/L (ref 98–107)
CO2 SERPL-SCNC: 24.1 MMOL/L (ref 22–29)
COLOR FLD: YELLOW
CREAT SERPL-MCNC: 2.65 MG/DL (ref 0.76–1.27)
CREAT UR-MCNC: 69.7 MG/DL
DEPRECATED RDW RBC AUTO: 40.8 FL (ref 37–54)
ERYTHROCYTE [DISTWIDTH] IN BLOOD BY AUTOMATED COUNT: 13.5 % (ref 12.3–15.4)
GFR SERPL CREATININE-BSD FRML MDRD: 24 ML/MIN/1.73
GLUCOSE BLDC GLUCOMTR-MCNC: 184 MG/DL (ref 70–130)
GLUCOSE BLDC GLUCOMTR-MCNC: 186 MG/DL (ref 70–130)
GLUCOSE BLDC GLUCOMTR-MCNC: 93 MG/DL (ref 70–130)
GLUCOSE BLDC GLUCOMTR-MCNC: 97 MG/DL (ref 70–130)
GLUCOSE FLD-MCNC: 119 MG/DL
GLUCOSE SERPL-MCNC: 73 MG/DL (ref 65–99)
HBA1C MFR BLD: 7.68 % (ref 4.8–5.6)
HCT VFR BLD AUTO: 29.5 % (ref 37.5–51)
HGB BLD-MCNC: 9.4 G/DL (ref 13–17.7)
LDH FLD-CCNC: <31 U/L
LDH SERPL-CCNC: 166 U/L (ref 135–225)
LYMPHOCYTES NFR FLD MANUAL: 70 %
MAGNESIUM SERPL-MCNC: 1.8 MG/DL (ref 1.6–2.4)
MCH RBC QN AUTO: 26.4 PG (ref 26.6–33)
MCHC RBC AUTO-ENTMCNC: 31.9 G/DL (ref 31.5–35.7)
MCV RBC AUTO: 82.9 FL (ref 79–97)
METHOD: NORMAL
MONOCYTES NFR FLD: 11 %
NEUTROPHILS NFR FLD MANUAL: 19 %
NUC CELL # FLD: 101 /MM3
PH FLD: 6.99 [PH]
PHOSPHATE SERPL-MCNC: 4.4 MG/DL (ref 2.5–4.5)
PLATELET # BLD AUTO: 234 10*3/MM3 (ref 140–450)
PMV BLD AUTO: 10.7 FL (ref 6–12)
POTASSIUM SERPL-SCNC: 4.2 MMOL/L (ref 3.5–5.2)
PROT ?TM UR-MCNC: 369 MG/DL
PROT FLD-MCNC: <1 G/DL
PROT SERPL-MCNC: 4.8 G/DL (ref 6–8.5)
PROT/CREAT UR: 5294.1 MG/G CREA (ref 0–200)
PTH-INTACT SERPL-MCNC: 107 PG/ML (ref 15–65)
QT INTERVAL: 511 MS
RBC # BLD AUTO: 3.56 10*6/MM3 (ref 4.14–5.8)
RBC # FLD AUTO: 15 /MM3
SODIUM SERPL-SCNC: 142 MMOL/L (ref 136–145)
VIT B12 BLD-MCNC: 1062 PG/ML (ref 211–946)
WBC NRBC COR # BLD: 5.68 10*3/MM3 (ref 3.4–10.8)

## 2021-12-15 PROCEDURE — 93005 ELECTROCARDIOGRAM TRACING: CPT | Performed by: INTERNAL MEDICINE

## 2021-12-15 PROCEDURE — 84155 ASSAY OF PROTEIN SERUM: CPT | Performed by: INTERNAL MEDICINE

## 2021-12-15 PROCEDURE — 87070 CULTURE OTHR SPECIMN AEROBIC: CPT | Performed by: INTERNAL MEDICINE

## 2021-12-15 PROCEDURE — 97166 OT EVAL MOD COMPLEX 45 MIN: CPT

## 2021-12-15 PROCEDURE — 82607 VITAMIN B-12: CPT | Performed by: INTERNAL MEDICINE

## 2021-12-15 PROCEDURE — 84157 ASSAY OF PROTEIN OTHER: CPT | Performed by: INTERNAL MEDICINE

## 2021-12-15 PROCEDURE — 87205 SMEAR GRAM STAIN: CPT | Performed by: INTERNAL MEDICINE

## 2021-12-15 PROCEDURE — 88305 TISSUE EXAM BY PATHOLOGIST: CPT | Performed by: INTERNAL MEDICINE

## 2021-12-15 PROCEDURE — 86334 IMMUNOFIX E-PHORESIS SERUM: CPT | Performed by: INTERNAL MEDICINE

## 2021-12-15 PROCEDURE — 83615 LACTATE (LD) (LDH) ENZYME: CPT | Performed by: INTERNAL MEDICINE

## 2021-12-15 PROCEDURE — 82962 GLUCOSE BLOOD TEST: CPT

## 2021-12-15 PROCEDURE — 0 LIDOCAINE 1 % SOLUTION: Performed by: RADIOLOGY

## 2021-12-15 PROCEDURE — 82945 GLUCOSE OTHER FLUID: CPT | Performed by: INTERNAL MEDICINE

## 2021-12-15 PROCEDURE — 84100 ASSAY OF PHOSPHORUS: CPT | Performed by: STUDENT IN AN ORGANIZED HEALTH CARE EDUCATION/TRAINING PROGRAM

## 2021-12-15 PROCEDURE — 82784 ASSAY IGA/IGD/IGG/IGM EACH: CPT | Performed by: INTERNAL MEDICINE

## 2021-12-15 PROCEDURE — 83735 ASSAY OF MAGNESIUM: CPT | Performed by: STUDENT IN AN ORGANIZED HEALTH CARE EDUCATION/TRAINING PROGRAM

## 2021-12-15 PROCEDURE — 82150 ASSAY OF AMYLASE: CPT | Performed by: INTERNAL MEDICINE

## 2021-12-15 PROCEDURE — 85027 COMPLETE CBC AUTOMATED: CPT | Performed by: STUDENT IN AN ORGANIZED HEALTH CARE EDUCATION/TRAINING PROGRAM

## 2021-12-15 PROCEDURE — 87206 SMEAR FLUORESCENT/ACID STAI: CPT | Performed by: INTERNAL MEDICINE

## 2021-12-15 PROCEDURE — 87015 SPECIMEN INFECT AGNT CONCNTJ: CPT | Performed by: INTERNAL MEDICINE

## 2021-12-15 PROCEDURE — 80048 BASIC METABOLIC PNL TOTAL CA: CPT | Performed by: STUDENT IN AN ORGANIZED HEALTH CARE EDUCATION/TRAINING PROGRAM

## 2021-12-15 PROCEDURE — 89051 BODY FLUID CELL COUNT: CPT | Performed by: INTERNAL MEDICINE

## 2021-12-15 PROCEDURE — 83036 HEMOGLOBIN GLYCOSYLATED A1C: CPT | Performed by: NURSE PRACTITIONER

## 2021-12-15 PROCEDURE — 84156 ASSAY OF PROTEIN URINE: CPT | Performed by: INTERNAL MEDICINE

## 2021-12-15 PROCEDURE — 76942 ECHO GUIDE FOR BIOPSY: CPT

## 2021-12-15 PROCEDURE — 99232 SBSQ HOSP IP/OBS MODERATE 35: CPT | Performed by: INTERNAL MEDICINE

## 2021-12-15 PROCEDURE — 71046 X-RAY EXAM CHEST 2 VIEWS: CPT

## 2021-12-15 PROCEDURE — 97162 PT EVAL MOD COMPLEX 30 MIN: CPT

## 2021-12-15 PROCEDURE — 87116 MYCOBACTERIA CULTURE: CPT | Performed by: INTERNAL MEDICINE

## 2021-12-15 PROCEDURE — 82306 VITAMIN D 25 HYDROXY: CPT | Performed by: INTERNAL MEDICINE

## 2021-12-15 PROCEDURE — 0W993ZZ DRAINAGE OF RIGHT PLEURAL CAVITY, PERCUTANEOUS APPROACH: ICD-10-PCS | Performed by: PATHOLOGY

## 2021-12-15 PROCEDURE — 63710000001 INSULIN LISPRO (HUMAN) PER 5 UNITS: Performed by: NURSE PRACTITIONER

## 2021-12-15 PROCEDURE — 83970 ASSAY OF PARATHORMONE: CPT | Performed by: INTERNAL MEDICINE

## 2021-12-15 PROCEDURE — 88112 CYTOPATH CELL ENHANCE TECH: CPT | Performed by: INTERNAL MEDICINE

## 2021-12-15 PROCEDURE — 93010 ELECTROCARDIOGRAM REPORT: CPT | Performed by: INTERNAL MEDICINE

## 2021-12-15 PROCEDURE — 97530 THERAPEUTIC ACTIVITIES: CPT

## 2021-12-15 PROCEDURE — 82570 ASSAY OF URINE CREATININE: CPT | Performed by: INTERNAL MEDICINE

## 2021-12-15 PROCEDURE — 83883 ASSAY NEPHELOMETRY NOT SPEC: CPT | Performed by: INTERNAL MEDICINE

## 2021-12-15 PROCEDURE — 83986 ASSAY PH BODY FLUID NOS: CPT | Performed by: INTERNAL MEDICINE

## 2021-12-15 PROCEDURE — 97535 SELF CARE MNGMENT TRAINING: CPT

## 2021-12-15 PROCEDURE — 25010000002 ONDANSETRON PER 1 MG: Performed by: STUDENT IN AN ORGANIZED HEALTH CARE EDUCATION/TRAINING PROGRAM

## 2021-12-15 PROCEDURE — 63710000001 INSULIN GLARGINE PER 5 UNITS: Performed by: NURSE PRACTITIONER

## 2021-12-15 RX ORDER — TORSEMIDE 20 MG/1
60 TABLET ORAL DAILY
Status: DISCONTINUED | OUTPATIENT
Start: 2021-12-15 | End: 2021-12-18

## 2021-12-15 RX ORDER — ONDANSETRON 2 MG/ML
4 INJECTION INTRAMUSCULAR; INTRAVENOUS EVERY 6 HOURS PRN
Status: DISCONTINUED | OUTPATIENT
Start: 2021-12-15 | End: 2021-12-22 | Stop reason: HOSPADM

## 2021-12-15 RX ORDER — LIDOCAINE HYDROCHLORIDE 10 MG/ML
10 INJECTION, SOLUTION INFILTRATION; PERINEURAL ONCE
Status: COMPLETED | OUTPATIENT
Start: 2021-12-15 | End: 2021-12-15

## 2021-12-15 RX ADMIN — INSULIN LISPRO 2 UNITS: 100 INJECTION, SOLUTION INTRAVENOUS; SUBCUTANEOUS at 17:18

## 2021-12-15 RX ADMIN — SODIUM CHLORIDE, PRESERVATIVE FREE 10 ML: 5 INJECTION INTRAVENOUS at 22:52

## 2021-12-15 RX ADMIN — FOLIC ACID 1000 MCG: 1 TABLET ORAL at 08:08

## 2021-12-15 RX ADMIN — ACETAMINOPHEN 650 MG: 325 TABLET, FILM COATED ORAL at 11:53

## 2021-12-15 RX ADMIN — APIXABAN 5 MG: 5 TABLET, FILM COATED ORAL at 22:51

## 2021-12-15 RX ADMIN — INSULIN LISPRO 2 UNITS: 100 INJECTION, SOLUTION INTRAVENOUS; SUBCUTANEOUS at 22:51

## 2021-12-15 RX ADMIN — APIXABAN 5 MG: 5 TABLET, FILM COATED ORAL at 12:45

## 2021-12-15 RX ADMIN — LEVOTHYROXINE SODIUM 75 MCG: 0.07 TABLET ORAL at 06:17

## 2021-12-15 RX ADMIN — Medication 1000 MCG: at 08:08

## 2021-12-15 RX ADMIN — ASPIRIN 81 MG: 81 TABLET, COATED ORAL at 08:08

## 2021-12-15 RX ADMIN — CARVEDILOL 25 MG: 25 TABLET, FILM COATED ORAL at 08:08

## 2021-12-15 RX ADMIN — BUPROPION HYDROCHLORIDE 150 MG: 150 TABLET, EXTENDED RELEASE ORAL at 08:08

## 2021-12-15 RX ADMIN — AMLODIPINE BESYLATE 5 MG: 5 TABLET ORAL at 08:08

## 2021-12-15 RX ADMIN — FERROUS SULFATE TAB 325 MG (65 MG ELEMENTAL FE) 325 MG: 325 (65 FE) TAB at 08:08

## 2021-12-15 RX ADMIN — ONDANSETRON 4 MG: 2 INJECTION INTRAMUSCULAR; INTRAVENOUS at 11:53

## 2021-12-15 RX ADMIN — TAMSULOSIN HYDROCHLORIDE 0.4 MG: 0.4 CAPSULE ORAL at 22:51

## 2021-12-15 RX ADMIN — FERROUS SULFATE TAB 325 MG (65 MG ELEMENTAL FE) 325 MG: 325 (65 FE) TAB at 17:18

## 2021-12-15 RX ADMIN — LIDOCAINE HYDROCHLORIDE 7 ML: 10 INJECTION, SOLUTION INFILTRATION; PERINEURAL at 10:38

## 2021-12-15 RX ADMIN — TORSEMIDE 60 MG: 20 TABLET ORAL at 11:52

## 2021-12-15 RX ADMIN — ATORVASTATIN CALCIUM 80 MG: 80 TABLET, FILM COATED ORAL at 22:51

## 2021-12-15 RX ADMIN — INSULIN GLARGINE 4 UNITS: 100 INJECTION, SOLUTION SUBCUTANEOUS at 22:52

## 2021-12-15 RX ADMIN — LISINOPRIL 20 MG: 20 TABLET ORAL at 08:08

## 2021-12-15 RX ADMIN — INSULIN LISPRO 2 UNITS: 100 INJECTION, SOLUTION INTRAVENOUS; SUBCUTANEOUS at 08:08

## 2021-12-15 NOTE — CONSULTS
Referring Provider: Emily Juan DO   Reason for Consultation: SYLVAIN    Subjective     Chief complaint   Chief Complaint   Patient presents with   • Abdominal Pain   • Hyperglycemia     patient hasnt taking meds   • Nausea   • Vomiting   • Cough       History of present illness:        86 years old white male with a past medical history of hypertension, poorly controlled diabetes mellitus type 2 and chronic kidney disease stage IV follows Dr. Bryan Huddleston.  Baseline creatinine appears to be around 2.3 mg/dL.  Patient presented to the emergency department because of abdominal pain nausea and vomiting.  His creatinine was 2.6 mg/dL on admission.  Chest x-ray showed right pleural effusion.  Patient was started on IV diuretic.  We were consulted to help with management of acute kidney injury and volume management.    Past Medical History:   Diagnosis Date   • Acquired hypothyroidism    • Acute congestive heart failure (HCC)    • Acute respiratory failure with hypoxia (HCC)    • Acute sinusitis    • SYLVAIN (acute kidney injury) (HCC)     on CKD   • Anemia    • Arthritis    • CAD (coronary artery disease)    • Cancer (HCC)     skin   • Chronic combined systolic and diastolic congestive heart failure (HCC)    • Chronic ischemic heart disease    • CKD (chronic kidney disease)    • COPD (chronic obstructive pulmonary disease) (HCC)    • Depression    • Diabetes mellitus type 2, uncontrolled, without complications    • Disease of thyroid gland    • Fatigue    • Frequent PVCs    • Heart attack (HCC)    • History of coronary artery disease     with remote history of bypass surgery in 2001   • History of transfusion    • Hyperglycemia    • Hyperlipidemia    • Hypertension    • Hypertensive encephalopathy    • Mental status change     Acute   • YAW on CPAP    • Pleural effusion    • Pneumonia    • RBBB (right bundle branch block)    • Screening for prostate cancer 2011   • Stroke (HCC)    • TIA (transient ischemic attack)    •  Type 2 diabetes mellitus (HCC)    • Urinary retention    • Vitamin D deficiency      Past Surgical History:   Procedure Laterality Date   • APPENDECTOMY N/A 2016    Dr. Alexey Dodson   • COLONOSCOPY      over 20 years ago.  no polyps    • COLONOSCOPY N/A 2018    Procedure: COLONOSCOPY;  Surgeon: Jose Enrique Louie MD;  Location: [facility] ENDOSCOPY;  Service: Gastroenterology   • COLONOSCOPY N/A 2018    IH, EH, polyps (TAs w/low grade dysplasia)   • COLONOSCOPY N/A 2020    Procedure: COLONOSCOPY;  Surgeon: Jose Enrique Louie MD;  Location: [facility] ENDOSCOPY;  Service: Gastroenterology;  Laterality: N/A;  Pre op-Anemia, Melena, History of Polyps  Post op-To Cecum/TI, Polyp, Poor Prep   • CORONARY ARTERY BYPASS GRAFT  2001   • ENDOSCOPY N/A 2020    Procedure: ESOPHAGOGASTRODUODENOSCOPY with biopsies;  Surgeon: Amanda Lovelace MD;  Location: [facility] ENDOSCOPY;  Service: Gastroenterology;  Laterality: N/A;  pre-anemia, dark stools  post-esophagitis, hiatal hernia   • ENDOSCOPY N/A 9/15/2020    Procedure: ESOPHAGOGASTRODUODENOSCOPY WITH BIOPSIES;  Surgeon: Jose Enrique Louie MD;  Location: [facility] ENDOSCOPY;  Service: Gastroenterology;  Laterality: N/A;  pre: history of melena and esophagitis  post: mild esophagitis and gastritis, small hiatal hernia   • HERNIA REPAIR Left 2019   • TONSILLECTOMY     • VASECTOMY       Family History   Problem Relation Age of Onset   • Diabetes Mother    • Hypertension Mother    • Macular degeneration Mother    • Alcohol abuse Father    • Cancer Father         lung,  age 65   • Heart disease Father    • Alcohol abuse Brother    • Cirrhosis Brother          age 50   • Liver cancer Brother 45   • Diabetes Son    • Kidney failure Son    • Autism Son      Social History     Tobacco Use   • Smoking status: Never Smoker   • Smokeless tobacco: Never Used   • Tobacco comment: daily caffeine   Vaping Use   • Vaping Use: Never used   Substance  Use Topics   • Alcohol use: No   • Drug use: No     Medications Prior to Admission   Medication Sig Dispense Refill Last Dose   • amLODIPine (NORVASC) 5 MG tablet Take 5 mg by mouth Daily.   Past Week at Unknown time   • aspirin 81 MG EC tablet Take 81 mg by mouth Daily.   Past Week at Unknown time   • buPROPion XL (WELLBUTRIN XL) 150 MG 24 hr tablet TAKE ONE TABLET BY MOUTH EVERY MORNING   Past Week at Unknown time   • carvedilol (COREG) 25 MG tablet Take 1 tablet by mouth 2 (Two) Times a Day With Meals. 60 tablet 0 Past Week at Unknown time   • Cholecalciferol (VITAMIN D3) 5000 units capsule capsule Take 5,000 Units by mouth Daily.   Past Month at Unknown time   • docusate sodium 100 MG capsule Take 100 mg by mouth.   Past Month at Unknown time   • ferrous sulfate 325 (65 FE) MG tablet Take 1 tablet by mouth Daily With Breakfast & Dinner. 180 tablet 3 Past Week at Unknown time   • folic acid (FOLVITE) 1 MG tablet TAKE ONE TABLET BY MOUTH DAILY 205 tablet 0 Past Week at Unknown time   • furosemide (LASIX) 40 MG tablet Take 1 tablet by mouth Daily.   Past Week at Unknown time   • levothyroxine (SYNTHROID, LEVOTHROID) 75 MCG tablet TAKE ONE TABLET BY MOUTH DAILY   Past Week at Unknown time   • lisinopril (PRINIVIL,ZESTRIL) 10 MG tablet Take 2 tablets by mouth Daily.   Past Week at Unknown time   • tamsulosin (FLOMAX) 0.4 MG capsule 24 hr capsule Take 1 capsule by mouth Every Night.   Past Week at Unknown time   • vitamin B-12 (CYANOCOBALAMIN) 1000 MCG tablet TAKE ONE TABLET BY MOUTH DAILY 205 tablet 0 Past Week at Unknown time   • acetaminophen (TYLENOL) 325 MG tablet Take 2 tablets by mouth Every 4 (Four) Hours As Needed for Mild Pain  or Fever (Temperature greater than or equal to 37 C).   More than a month at Unknown time   • apixaban (ELIQUIS) 5 MG tablet tablet Take 1 tablet by mouth Every 12 (Twelve) Hours. Indications: Other - full anticoagulation 60 tablet 0 More than a month at Unknown time   • atorvastatin  "(LIPITOR) 80 MG tablet Take 1 tablet by mouth Every Night. 30 tablet 0    • Insulin Glargine (BASAGLAR KWIKPEN) 100 UNIT/ML injection pen Inject 4 Units under the skin into the appropriate area as directed Every Night. Patient says he does not take consistently      • insulin glulisine (APIDRA) 100 UNIT/ML injection Inject 2 Units under the skin into the appropriate area as directed 3 (Three) Times a Day Before Meals. Patient says he does not take consistently      • melatonin 3 MG tablet Take 1 tablet by mouth Every Night. 30 tablet 0      Allergies:  Prozac [fluoxetine hcl]    Review of Systems  Pertinent items are noted in HPI.    Objective     Vital Signs  Temp:  [97.3 °F (36.3 °C)-97.9 °F (36.6 °C)] 97.5 °F (36.4 °C)  Heart Rate:  [] 62  Resp:  [18-19] 18  BP: (113-160)/(65-98) 113/65    Flowsheet Rows      First Filed Value   Admission Height 182.9 cm (72\") Documented at 12/13/2021 2116   Admission Weight 86.2 kg (190 lb) Documented at 12/13/2021 2116           No intake/output data recorded.  I/O last 3 completed shifts:  In: 1100 [P.O.:600; IV Piggyback:500]  Out: 1250 [Urine:1250]    Intake/Output Summary (Last 24 hours) at 12/15/2021 0712  Last data filed at 12/15/2021 0619  Gross per 24 hour   Intake 600 ml   Output 1250 ml   Net -650 ml       Physical Exam:     General Appearance:    Alert, cooperative, in no acute distress   Head:    Normocephalic, without obvious abnormality, atraumatic   Eyes:            Lids and lashes normal, conjunctivae and sclerae normal, no   icterus, no pallor, corneas clear, PERRLA   Ears:    Ears appear intact with no abnormalities noted   Throat:   No oral lesions, no thrush, oral mucosa moist   Neck:   No adenopathy, supple, trachea midline, no thyromegaly, no   carotid bruit, no JVD   Back:     No kyphosis present, no scoliosis present, no skin lesions,      erythema or scars, no tenderness to percussion or                   palpation,   range of motion normal "   Lungs:     Clear to auscultation,respirations regular, even and                  unlabored    Heart:    Regular rhythm and normal rate, normal S1 and S2, no            murmur, no gallop, no rub, no click   Chest Wall:    No abnormalities observed   Abdomen:     Normal bowel sounds, no masses, no organomegaly, soft        non-tender, non-distended, no guarding, no rebound                tenderness   Rectal:     Deferred   Extremities:   Moves all extremities well, no edema, no cyanosis, no             redness   Pulses:   Pulses palpable and equal bilaterally   Skin:   No bleeding, bruising or rash               Results Review:  Results from last 7 days   Lab Units 12/15/21  0410 12/14/21  0421 12/13/21  1614   SODIUM mmol/L 142 139 138   POTASSIUM mmol/L 4.2 3.7 4.0   CHLORIDE mmol/L 109* 110* 107   CO2 mmol/L 24.1 21.8* 23.7   BUN mg/dL 29* 24* 26*   CREATININE mg/dL 2.65* 2.23* 2.64*   CALCIUM mg/dL 8.1* 8.2* 9.0   BILIRUBIN mg/dL  --   --  0.2   ALK PHOS U/L  --   --  86   ALT (SGPT) U/L  --   --  8   AST (SGOT) U/L  --   --  13   GLUCOSE mg/dL 73 104* 190*       Estimated Creatinine Clearance: 33.1 mL/min (A) (by C-G formula based on SCr of 2.65 mg/dL (H)).    Results from last 7 days   Lab Units 12/15/21  0410   MAGNESIUM mg/dL 1.8   PHOSPHORUS mg/dL 4.4       Results from last 7 days   Lab Units 12/15/21  0410 12/14/21  0421 12/13/21  1614   WBC 10*3/mm3 5.68 6.52 6.11   HEMOGLOBIN g/dL 9.4* 9.9* 11.7*   PLATELETS 10*3/mm3 234 233 283             Active Medications  amLODIPine, 5 mg, Oral, Daily  aspirin, 81 mg, Oral, Daily  atorvastatin, 80 mg, Oral, Nightly  buPROPion XL, 150 mg, Oral, Daily  carvedilol, 25 mg, Oral, BID With Meals  ferrous sulfate, 325 mg, Oral, Daily With Breakfast & Dinner  folic acid, 1,000 mcg, Oral, Daily  furosemide, 40 mg, Intravenous, Q12H  insulin glargine, 4 Units, Subcutaneous, Nightly  insulin lispro, 0-9 Units, Subcutaneous, 4x Daily With Meals & Nightly  levothyroxine, 75  mcg, Oral, Q AM  lisinopril, 20 mg, Oral, Daily  senna-docusate sodium, 2 tablet, Oral, Daily  sodium chloride, 10 mL, Intravenous, Q12H  tamsulosin, 0.4 mg, Oral, Nightly  vitamin B-12, 1,000 mcg, Oral, Daily      hold, 1 each        Assessment/Plan       Acute on chronic combined systolic and diastolic congestive heart failure (HCC)    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)    Stage 3b chronic kidney disease (HCC)    Acquired hypothyroidism    Hypertensive urgency    Pleural effusion    YAW (obstructive sleep apnea)    Coronary artery disease involving native coronary artery of native heart without angina pectoris    Chronically elevated troponin    Anemia, chronic renal failure, stage 3 (moderate) (HCC)    Essential hypertension    Patent foramen ovale    Pleural effusion, bilateral    Cardiomyopathy (HCC)    Lower abdominal pain    Diarrhea    -Acute kidney injury on chronic kidney disease stage IV: His kidney function has been fluctuating but most recently had been in the range between 2 and 2.3 mg/dL.  Creatinine today is 2.6 mg/dL.  Patient appears to be euvolemic.  He is going to undergo thoracentesis. Will stop IV diuretic and start him on oral torsemide 40 mg p.o. daily.  The patient is noncompliant and we had lengthy discussion about importance of compliance.    -Proteinuria: Most likely due to diabetic nephropathy.  Will send free serum kappa light chain  -Pleural effusion: Patient is going to undergo thoracentesis today  -Diabetes mellitus type 2: Noncompliant with the treatment  -Hypertension  -Acute on chronic congestive heart failure last ejection fraction 31-35% with fixed restrictive diastolic congestive heart failure  -Anemia of chronic illness: Last hemoglobin 9.4.  Last iron profile appears to be adequate  -Coronary artery disease  -History of stroke  -History of obstructive sleep apnea  -History of noncompliance            Vitor Parmar MD  12/15/21  07:12  EST

## 2021-12-15 NOTE — PROGRESS NOTES
"    Name: Carlito Mo ADMIT: 2021   : 1955  PCP: Florencia Caballero MD    MRN: [id number] LOS: 1 days   AGE/SEX: 66 y.o. male  ROOM: 30/     Subjective   Subjective   Seen sitting up in chair.  Thinks his lower extremity swelling has slightly improved.  Says he is feeling \"not better but not worse\".  Understands the plan for thoracentesis today.    Review of Systems  Denies chest pain, abdominal pain, dysuria, fevers     Objective   Objective   Vital Signs  Temp:  [97.3 °F (36.3 °C)-98.4 °F (36.9 °C)] 98.4 °F (36.9 °C)  Heart Rate:  [] 58  Resp:  [16-19] 16  BP: (113-160)/(60-98) 113/60  SpO2:  [87 %-98 %] 97 %  on  Flow (L/min):  [2] 2;   Device (Oxygen Therapy): room air  Body mass index is 25.51 kg/m².  Physical Exam  Constitutional:       General: He is not in acute distress.     Appearance: He is not diaphoretic.      Comments: Chronically ill-appearing   HENT:      Mouth/Throat:      Mouth: Mucous membranes are moist.   Eyes:      General: No scleral icterus.  Cardiovascular:      Rate and Rhythm: Normal rate and regular rhythm.      Heart sounds: No murmur heard.  No gallop.    Pulmonary:      Effort: Pulmonary effort is normal. No respiratory distress.      Breath sounds: No wheezing or rhonchi.   Abdominal:      General: There is no distension.      Palpations: Abdomen is soft.      Tenderness: There is no abdominal tenderness. There is no guarding.   Skin:     General: Skin is warm and dry.   Neurological:      Mental Status: He is alert.         Results Review     I reviewed the patient's new clinical results.  Results from last 7 days   Lab Units 12/15/21  0410 21  04221  1614   WBC 10*3/mm3 5.68 6.52 6.11   HEMOGLOBIN g/dL 9.4* 9.9* 11.7*   PLATELETS 10*3/mm3 234 233 283     Results from last 7 days   Lab Units 12/15/21  0410 21  04221  1614   SODIUM mmol/L 142 139 138   POTASSIUM mmol/L 4.2 3.7 4.0   CHLORIDE mmol/L 109* 110* 107   CO2 mmol/L 24.1 " 21.8* 23.7   BUN mg/dL 29* 24* 26*   CREATININE mg/dL 2.65* 2.23* 2.64*   GLUCOSE mg/dL 73 104* 190*   Estimated Creatinine Clearance: 33.1 mL/min (A) (by C-G formula based on SCr of 2.65 mg/dL (H)).  Results from last 7 days   Lab Units 12/13/21  1614   ALBUMIN g/dL 3.10*   BILIRUBIN mg/dL 0.2   ALK PHOS U/L 86   AST (SGOT) U/L 13   ALT (SGPT) U/L 8     Results from last 7 days   Lab Units 12/15/21  0410 12/14/21  0421 12/13/21  1614   CALCIUM mg/dL 8.1* 8.2* 9.0   ALBUMIN g/dL  --   --  3.10*   MAGNESIUM mg/dL 1.8  --   --    PHOSPHORUS mg/dL 4.4  --   --        COVID19   Date Value Ref Range Status   12/13/2021 Not Detected Not Detected - Ref. Range Final   05/20/2021 Not Detected Not Detected - Ref. Range Final     Hemoglobin A1C   Date/Time Value Ref Range Status   12/15/2021 0410 7.68 (H) 4.80 - 5.60 % Final     Glucose   Date/Time Value Ref Range Status   12/15/2021 0609 93 70 - 130 mg/dL Final     Comment:     Meter: [id number] : [id number] Tegan Ingram JAMES   12/14/2021 2126 169 (H) 70 - 130 mg/dL Final     Comment:     Meter: [id number] : [id number] Tegan Ingram JAMES   12/14/2021 1614 156 (H) 70 - 130 mg/dL Final     Comment:     Meter: [id number] : [id number] Ojeda Laraleoncio RAMIREZ       XR Chest 1 View  Narrative: SINGLE VIEW OF THE CHEST     HISTORY: Shortness of air     COMPARISON: 05/20/2021     FINDINGS:  Cardiomegaly is present. There is vascular congestion. There is dense  bibasilar consolidation, as well as bilateral pleural effusions, right  greater than left. No pneumothorax is seen.     Impression: As above.     This report was finalized on 12/14/2021 12:09 AM by Dr. Alice Allen M.D.       I reviewed the patient's daily medications.  Scheduled Medications  amLODIPine, 5 mg, Oral, Daily  aspirin, 81 mg, Oral, Daily  atorvastatin, 80 mg, Oral, Nightly  buPROPion XL, 150 mg, Oral, Daily  carvedilol, 25 mg, Oral, BID With Meals  ferrous sulfate, 325 mg, Oral, Daily With Breakfast &  Dinner  folic acid, 1,000 mcg, Oral, Daily  insulin glargine, 4 Units, Subcutaneous, Nightly  insulin lispro, 0-9 Units, Subcutaneous, 4x Daily With Meals & Nightly  levothyroxine, 75 mcg, Oral, Q AM  lisinopril, 20 mg, Oral, Daily  senna-docusate sodium, 2 tablet, Oral, Daily  sodium chloride, 10 mL, Intravenous, Q12H  tamsulosin, 0.4 mg, Oral, Nightly  torsemide, 60 mg, Oral, Daily  vitamin B-12, 1,000 mcg, Oral, Daily    Infusions  hold, 1 each    Diet  Diet Regular; Cardiac, Consistent Carbohydrate       Assessment/Plan     Active Hospital Problems    Diagnosis  POA   • **Acute on chronic combined systolic and diastolic congestive heart failure (HCC) [I50.43]  Yes   • Diarrhea [R19.7]  Yes   • Lower abdominal pain [R10.30]  Yes   • Cardiomyopathy ([facility]) [I42.9]  Yes   • Patent foramen ovale [Q21.1]  Not Applicable   • Pleural effusion, bilateral [J90]  Yes   • Essential hypertension [I10]  Yes   • Anemia, chronic renal failure, stage 3 (moderate) ([facility]) [N18.30, D63.1]  Yes   • Chronically elevated troponin [R77.8]  Yes   • Coronary artery disease involving native coronary artery of native heart without angina pectoris [I25.10]  Yes   • YAW (obstructive sleep apnea) [G47.33]  Yes   • Pleural effusion [J90]  Yes   • Hypertensive urgency [I16.0]  Yes   • Acquired hypothyroidism [E03.9]  Yes   • Stage 3b chronic kidney disease ([facility]) [N18.32]  Yes   • Type 2 diabetes mellitus with ophthalmic complication ([facility]) [E11.39]  Yes      Resolved Hospital Problems   No resolved problems to display.       66-year-old male with a history of diabetes, coronary artery disease status post CABG, CKD, chronic systolic heart failure, and CVA in May 2021 who presented with abdominal pain, nausea and vomiting.  He is admitted for acute on chronic combined systolic and diastolic heart failure and bilateral pleural effusions, likely due to medication noncompliance.    Acute on chronic systolic and diastolic heart failure  -Torsemide 60 mg  daily.  Continue beta-blocker and ACE inhibitor.  Cardiology following  - nephrology following to assist with diuresis in the setting of CKD     Bilateral pleural effusions  -Diuresis as above.  Pulmonology following, thoracentesis today.     History of stroke, May 2021  -On apixaban.  Held on admission for thoracentesis, resume after     Type 2 diabetes  -Start basal and sliding scale insulin     CKD stage III  -Baseline creatinine appears to be 1.9-2.2.  Nephrology following     Hypertensive urgency- resolved  -Resume home amlodipine, Coreg, lisinopril. Lasix as above      · Eliquis (home med) for DVT prophylaxis.   · Full code.  · Discussed with patient and nursing staff.  · Anticipate discharge home when cleared by consultants.      Emily Juan, [facility]  12/15/21  10:58 EST    Patient was placed in face mask on first look.  I wore protective equipment throughout this patient encounter including a face mask, gloves and protective eyewear.  Hand hygiene was performed before donning protective equipment and after removal when leaving the room.

## 2021-12-15 NOTE — PLAN OF CARE
Goal Outcome Evaluation:  Plan of Care Reviewed With: patient           Outcome Summary: Patient is 65 yo male admitted to [facility] with acute on chronic heart failure. PMH significant for DM, CAD, CVA, HTN. Patient lives with family, reports 3 steps to enter home, tri-level home. Patient uses a cane at baseline. Today, patient performed sit to stand with SBA, ambulated 200 feet with STC and CGA. Patient demonstrates impairments consisting of generalized weakness, decreased activity tolerance and may benefit from skilled PT. Anticipate patient will return home at d/c.  Patient was intermittently wearing a face mask during this therapy encounter. Therapist used appropriate personal protective equipment including eye protection, mask, and gloves.  Mask used was standard procedure mask. Appropriate PPE was worn during the entire therapy session. Hand hygiene was completed before and after therapy session. Patient is not in enhanced droplet precautions.

## 2021-12-15 NOTE — PROGRESS NOTES
Jorge Shukla MD                          [phone]      Patient ID:    Name:  Carlito Mo    MRN:  [id number]    1955   66 y.o.  male            Patient Care Team:  Florencia Caballero MD as PCP - General (Internal Medicine)  Amber Murguia MD as Consulting Physician (Cardiology)  Sera La RP as Pharmacist  OSeth Conte MD as Surgeon (General Surgery)  Kim Hoyos MD PhD as Consulting Physician (Hematology and Oncology)  Jerome Diallo MD as Referring Physician (Family Medicine)  Jose Enrique Louie MD as Consulting Physician (Gastroenterology)  Nima Huddleston MD as Consulting Physician (Nephrology)    CC/ Reason for visit: Bilateral pleural effusions, congestive heart failure, CKD, medical noncompliance    Subjective: Pt seen and examined this AM. No acute overnight events noted. Doing better.  Underwent ultrasound-guided thoracentesis uneventfully and states that he is feeling little better.    ROS: Denies any subjective fevers, syncope or presyncopal events, new neurological deficits, nausea or vomiting currently    Objective     Vital Signs past 24hrs    BP range: BP: ()/(58-98) 94/58  Pulse range: Heart Rate:  [54-78] 56  Resp rate range: Resp:  [16-19] 18  Temp range: Temp (24hrs), Av.9 °F (36.6 °C), Min:97.5 °F (36.4 °C), Max:98.4 °F (36.9 °C)      Ventilator/Non-Invasive Ventilation Settings (From admission, onward)             Start     Ordered    21 1356  NIPPV (CPAP or BIPAP)  Until Discontinued        Question Answer Comment   Indication: Sleep Apnea    Type: AutoCPAP    Min Pressure 10        21 1356                Device (Oxygen Therapy): room air       85.3 kg (188 lb 1.6 oz); Body mass index is 25.51 kg/m².      Intake/Output Summary (Last 24 hours) at 12/15/2021 1609  Last data filed at 12/15/2021 1508  Gross per 24 hour   Intake 770 ml   Output 3100 ml   Net -2330 ml       PHYSICAL EXAM    Constitutional: Middle aged pt in bed, No acute respiratory distress, + accessory muscle use  Head: - NCAT  Eyes: No pallor, Anicteric conjunctiva, EOMI.  ENMT:  Mallampati 3, no oral thrush. Moist MM.   NECK: Trachea midline, No thyromegaly, no palpable cervical LNpathy  Heart: RRR, no murmur. 1+ pedal edema   Lungs: HAYDEN +, decreased breath sound at the bases.  No wheezes/ crackles heard    Abdomen: Soft. No tenderness, guarding or rigidity. No palpable masses  Extremities: Extremities warm and well perfused. No cyanosis/ clubbing  Neuro: Conscious, answers appropriately, no gross focal neuro deficits  Psych: Mood and affect appropriate    PPE recommended per [facility] infectious disease Isolation protocol for the current clinical scenario(as mentioned below) was followed.     Scheduled meds:  amLODIPine, 5 mg, Oral, Daily  apixaban, 5 mg, Oral, Q12H  aspirin, 81 mg, Oral, Daily  atorvastatin, 80 mg, Oral, Nightly  buPROPion XL, 150 mg, Oral, Daily  carvedilol, 25 mg, Oral, BID With Meals  ferrous sulfate, 325 mg, Oral, Daily With Breakfast & Dinner  folic acid, 1,000 mcg, Oral, Daily  insulin glargine, 4 Units, Subcutaneous, Nightly  insulin lispro, 0-9 Units, Subcutaneous, 4x Daily With Meals & Nightly  levothyroxine, 75 mcg, Oral, Q AM  lisinopril, 20 mg, Oral, Daily  senna-docusate sodium, 2 tablet, Oral, Daily  sodium chloride, 10 mL, Intravenous, Q12H  tamsulosin, 0.4 mg, Oral, Nightly  torsemide, 60 mg, Oral, Daily  vitamin B-12, 1,000 mcg, Oral, Daily        IV meds:                      hold, 1 each        Data Review:      Results from last 7 days   Lab Units 12/15/21  0410 12/14/21  0421 12/13/21  1614   SODIUM mmol/L 142 139 138   POTASSIUM mmol/L 4.2 3.7 4.0   CHLORIDE mmol/L 109* 110* 107   CO2 mmol/L 24.1 21.8* 23.7   BUN mg/dL 29* 24* 26*   CREATININE mg/dL 2.65* 2.23* 2.64*   CALCIUM mg/dL 8.1* 8.2* 9.0   BILIRUBIN mg/dL  --   --  0.2   ALK PHOS U/L  --   --  86   ALT (SGPT) U/L  --   --   8   AST (SGOT) U/L  --   --  13   GLUCOSE mg/dL 73 104* 190*   WBC 10*3/mm3 5.68 6.52 6.11   HEMOGLOBIN g/dL 9.4* 9.9* 11.7*   PLATELETS 10*3/mm3 234 233 283   PROBNP pg/mL  --   --  7,027.0*       Lab Results   Component Value Date    CALCIUM 8.1 (L) 12/15/2021    PHOS 4.4 12/15/2021                    Results Review:    I have reviewed the relevant laboratory results and independently reviewed the chest imaging from this hospitalization including the available echocardiogram reports personally and summarized it if/ when appropriate below    Assessment    Bilateral- Rt>Lt pl effusion s/p Rt thora- 2L; 12/15   Acute on chronic systolic heart failure  Medical noncompliance  Previous CVA on Eliquis; noncompliant  CKD  CAD s/p prior set CABG  Previous GI bleed  YAW on CPAP     RECOMMENDATIONS:  Patient underwent right-sided ultrasound-guided thoracentesis and 2 L of fluid was removed successfully.  Patient states that he is feeling well better.  Still remains on room air.  Ongoing diuresis per cardiology.  Would hold off on left-sided thoracentesis for now until we have any respiratory issues.  We will follow up with chest x-ray.  Nephrology now on board and managing diuretics.  Medical compliance recommended    Full code          Jorge Shukla MD  12/15/2021

## 2021-12-15 NOTE — CASE MANAGEMENT/SOCIAL WORK
Discharge Planning Assessment  [facility]     Patient Name: Carlito Mo  MRN: [id number]  Today's Date: 12/15/2021    Admit Date: 12/13/2021     Discharge Needs Assessment     Row Name 12/15/21 1429       Living Environment    Lives With spouse    Name(s) of Who Lives With Patient spouse Lissette Mo [phone].    Current Living Arrangements home/apartment/condo    Primary Care Provided by self; spouse/significant other    Provides Primary Care For no one, unable/limited ability to care for self    Family Caregiver if Needed spouse    Family Caregiver Names spouse Lissette Mo [phone].    Quality of Family Relationships helpful; involved; supportive    Able to Return to Prior Arrangements yes       Resource/Environmental Concerns    Resource/Environmental Concerns home accessibility    Home Accessibility Concerns stairs to enter home; stairs to access bedroom or bathroom  3 steps with no handrails to enter his 2 story home with 11 steps and 2 handrails (both are currently broken) to get to the bedrooms in his home.       Transition Planning    Patient/Family Anticipates Transition to home with family    Patient/Family Anticipated Services at Transition none    Transportation Anticipated family or friend will provide       Discharge Needs Assessment    Equipment Currently Used at Home cpap; oxygen    Concerns to be Addressed discharge planning    Anticipated Changes Related to Illness none    Equipment Needed After Discharge none    Current Discharge Risk physical impairment               Discharge Plan     Row Name 12/15/21 1421       Plan    Plan Plans home; denies needs.    Provided Post Acute Provider List? N/A    N/A Provider List Comment Denies HH/SNF needs.    Provided Post Acute Provider Quality & Resource List? N/A    N/A Quality & Resource List Comment Denies HH/SNF needs.    Patient/Family in Agreement with Plan yes    Plan Comments Spoke to the patient; explained role of CCP, verified facesheet and  discussed discharge planning needs.  The patient plans to return home upon d/c with assistance from his spouse Lissette Mo [phone].  The patient has a cpap and is on 2-3 liters of home O2 with [employer].  The patient has 3 steps with no handrails to enter his 2 story home with 11 steps and 2 handrails (both are currently broken) to get to the bedrooms in his home. The patient’s pharmacy is [facility] on [address] and the patient states he has no trouble remembering to take his medication and states that his money gets tight when he has to purchase certain medication.  The patient is agreeable to using [facility]. The patient states that he has used [facility] in the past, denies any SNF history, was encouraged to bring his POA documents, states that his wife can transport him to appointments and will transport him home upon d/c.  The patient currently denies any d/c needs and CCP will follow to assist with any d/c needs that may arise.  FAREED So              Continued Care and Services - Admitted Since 12/13/2021    Coordination has not been started for this encounter.          Demographic Summary     Row Name 12/15/21 1428       General Information    Admission Type inpatient    Arrived From home    Referral Source admission list    Reason for Consult discharge planning    Preferred Language English     Used During This Interaction no               Functional Status     Row Name 12/15/21 1428       Functional Status    Usual Activity Tolerance good    Current Activity Tolerance moderate       Functional Status, IADL    Medications independent    Meal Preparation independent    Housekeeping independent    Laundry independent    Shopping independent       Mental Status Summary    Recent Changes in Mental Status/Cognitive Functioning no changes               Psychosocial    No documentation.                Abuse/Neglect    No documentation.                Legal    No documentation.                 Substance Abuse    No documentation.                Patient Forms    No documentation.                   FAREED Core[place]

## 2021-12-15 NOTE — THERAPY EVALUATION
Patient Name: Carlito Mo  : 1955    MRN: [id number]                              Today's Date: 12/15/2021       Admit Date: 2021    Visit Dx:     ICD-10-CM ICD-9-CM   1. Lower abdominal pain  R10.30 789.09   2. Mild shortness of breath  R06.02 786.05   3. Elevated brain natriuretic peptide (BNP) level  R79.89 790.99   4. Stage 3 chronic kidney disease, unspecified whether stage 3a or 3b CKD (HCC)  N18.30 585.3   5. Pleural effusion  J90 511.9     Patient Active Problem List   Diagnosis   • Major depressive disorder with single episode, in partial remission (HCC)   • Other hyperlipidemia   • Vitamin D deficiency   • Erectile dysfunction   • Chronic fatigue   • Type 2 diabetes mellitus with ophthalmic complication ([facility])   • Skin lesion of chest wall   • Slow transit constipation   • Benign prostatic hyperplasia with nocturia   • Stage 3b chronic kidney disease (HCC)   • History of basal cell carcinoma   • High blood pressure associated with diabetes ([facility])   • Acquired hypothyroidism   • Hypertensive urgency   • Recurrent strokes ([facility])   • Urinary retention   • Pneumonia   • Acute on chronic combined systolic and diastolic congestive heart failure (HCC)   • Acute respiratory failure with hypoxia ([facility])   • Suspected sleep apnea   • Pleural effusion   • YAW (obstructive sleep apnea)   • Coronary artery disease involving native coronary artery of native heart without angina pectoris   • Chronically elevated troponin   • Colon cancer screening   • Enlarged lymph nodes   • Functional gait abnormality   • History of myocardial infarction   • Hospital discharge follow-up   • Insomnia   • Iron deficiency anemia   • Major depression, recurrent (HCC)   • Anemia, chronic renal failure, stage 3 (moderate) (HCC)   • Essential hypertension   • Adverse effect of iron and its compounds, initial encounter   • Acute on chronic renal insufficiency   • Debility   • Falls frequently   • Acute pain of right shoulder   •  Acute on chronic anemia   • Heme positive stool   • Neurogenic orthostatic hypotension (HCC)   • Symptomatic anemia   • History of colon polyps   • Helicobacter pylori gastritis   • Esophagitis   • Sleep related hypoxia   • Embolic stroke (HCC)   • Patent foramen ovale   • Pleural effusion, bilateral   • Cardiomyopathy (HCC)   • Lower abdominal pain   • Diarrhea     Past Medical History:   Diagnosis Date   • Acquired hypothyroidism    • Acute congestive heart failure (HCC)    • Acute respiratory failure with hypoxia (HCC)    • Acute sinusitis    • SYLVAIN (acute kidney injury) (HCC)     on CKD   • Anemia    • Arthritis    • CAD (coronary artery disease)    • Cancer (HCC)     skin   • Chronic combined systolic and diastolic congestive heart failure (HCC)    • Chronic ischemic heart disease    • CKD (chronic kidney disease)    • COPD (chronic obstructive pulmonary disease) (HCC)    • Depression    • Diabetes mellitus type 2, uncontrolled, without complications    • Disease of thyroid gland    • Fatigue    • Frequent PVCs    • Heart attack (HCC)    • History of coronary artery disease     with remote history of bypass surgery in 2001   • History of transfusion    • Hyperglycemia    • Hyperlipidemia    • Hypertension    • Hypertensive encephalopathy    • Mental status change     Acute   • YAW on CPAP    • Pleural effusion    • Pneumonia    • RBBB (right bundle branch block)    • Screening for prostate cancer 2011   • Stroke (HCC)    • TIA (transient ischemic attack)    • Type 2 diabetes mellitus (HCC)    • Urinary retention    • Vitamin D deficiency      Past Surgical History:   Procedure Laterality Date   • APPENDECTOMY N/A 02/14/2016    Dr. Alexey Dodson   • COLONOSCOPY      over 20 years ago.  no polyps    • COLONOSCOPY N/A 9/18/2018    Procedure: COLONOSCOPY;  Surgeon: Jose Enrique Louie MD;  Location: [facility] ENDOSCOPY;  Service: Gastroenterology   • COLONOSCOPY N/A 9/19/2018    IH, EH, polyps (TAs w/low grade  dysplasia)   • COLONOSCOPY N/A 6/1/2020    Procedure: COLONOSCOPY;  Surgeon: Jose Enrique Louie MD;  Location:  [facility];  Service: Gastroenterology;  Laterality: N/A;  Pre op-Anemia, Melena, History of Polyps  Post op-To Cecum/TI, Polyp, Poor Prep   • CORONARY ARTERY BYPASS GRAFT  11/2001   • ENDOSCOPY N/A 5/29/2020    Procedure: ESOPHAGOGASTRODUODENOSCOPY with biopsies;  Surgeon: Amanda Lovelace MD;  Location:  [facility]OSCOPY;  Service: Gastroenterology;  Laterality: N/A;  pre-anemia, dark stools  post-esophagitis, hiatal hernia   • ENDOSCOPY N/A 9/15/2020    Procedure: ESOPHAGOGASTRODUODENOSCOPY WITH BIOPSIES;  Surgeon: Jose Enrique Louie MD;  Location:  [facility];  Service: Gastroenterology;  Laterality: N/A;  pre: history of melena and esophagitis  post: mild esophagitis and gastritis, small hiatal hernia   • HERNIA REPAIR Left 12/05/2019   • TONSILLECTOMY     • VASECTOMY        General Information     Row Name 12/15/21 0857          OT Time and Intention    Document Type evaluation  -     Mode of Treatment individual therapy; occupational therapy  -     Row Name 12/15/21 0857          General Information    Patient Profile Reviewed yes  -JW     Prior Level of Function independent:; ADL's; all household mobility  Pt reports being independent with ADLs, mobility using a cane. Reports 1 recent fall  -     Existing Precautions/Restrictions fall  -     Barriers to Rehab none identified  -     Row Name 12/15/21 0857          Living Environment    Lives With spouse; child(carlos), adult  -     Row Name 12/15/21 0857          Cognition    Orientation Status (Cognition) oriented x 4  -     Row Name 12/15/21 0857          Safety Issues, Functional Mobility    Impairments Affecting Function (Mobility) balance; endurance/activity tolerance; shortness of breath; strength  -           User Key  (r) = Recorded By, (t) = Taken By, (c) = Cosigned By    Initials Name Provider Type    CONNOR Cyr  "DANGELO Griffin Occupational Therapist                 Mobility/ADL's     Row Name 12/15/21 0858          Bed Mobility    Bed Mobility supine-sit  -     Supine-Sit [place] (Bed Mobility) modified independence  -     Assistive Device (Bed Mobility) head of bed elevated  -     Row Name 12/15/21 0858          Transfers    Transfers bed-chair transfer; sit-stand transfer  -     Bed-Chair [place] (Transfers) standby assist  -     Assistive Device (Bed-Chair Transfers) cane, straight  -     Sit-Stand [place] (Transfers) standby assist  -     Row Name 12/15/21 0858          Sit-Stand Transfer    Assistive Device (Sit-Stand Transfers) cane, straight  -     Row Name 12/15/21 0858          Functional Mobility    Functional Mobility- Ind. Level standby assist  -     Functional Mobility- Device straight cane  -     Functional Mobility- Comment fxl ambulation around room to simualte household mobility. Use of SPC, no LOB but sluightly unsteady. Reports feeling \"weak on my feet\"  -     Row Name 12/15/21 0858          Activities of Daily Living    BADL Assessment/Intervention grooming  -[facility] Name 12/15/21 0858          Grooming Assessment/Training    [place] Level (Grooming) grooming skills; wash face, hands; oral care regimen; supervision  -     Position (Grooming) sink side; unsupported standing  -     Comment (Grooming) SBA for prolonged static standing balance at the sink while performing oral care and grooming tasks  -           User Key  (r) = Recorded By, (t) = Taken By, (c) = Cosigned By    Initials Name Provider Type     Gaby Cyr OT Occupational Therapist               Obj/Interventions     Row Name 12/15/21 0900          Sensory Assessment (Somatosensory)    Sensory Assessment (Somatosensory) sensation intact  -[facility] Name 12/15/21 0900          Vision Assessment/Intervention    Visual Impairment/Limitations corrective lenses full-time  -     Row Name 12/15/21 " 0900          Range of Motion Comprehensive    General Range of Motion no range of motion deficits identified  -[facility] Name 12/15/21 0900          Strength Comprehensive (MMT)    General Manual Muscle Testing (MMT) Assessment upper extremity strength deficits identified  -     Comment, General Manual Muscle Testing (MMT) Assessment generalized weakness in BUEs, 4/5 grossly  -[facility] Name 12/15/21 0900          Motor Skills    Motor Skills functional endurance  -     Functional Endurance fair. DE LEON with fxl ambulation in room, SpO2 s/p ax=90% on RA  -[facility] Name 12/15/21 0900          Balance    Balance Assessment sitting static balance; sitting dynamic balance; standing static balance; standing dynamic balance  -     Static Sitting Balance WFL; unsupported; sitting, edge of bed  -     Dynamic Sitting Balance WFL; unsupported; sitting, edge of bed  -     Static Standing Balance WFL; unsupported; standing  -     Dynamic Standing Balance mild impairment; standing  -     Balance Interventions sitting; standing; sit to stand; supported; static; dynamic; occupation based/functional task  -           User Key  (r) = Recorded By, (t) = Taken By, (c) = Cosigned By    Initials Name Provider Type     Gaby Cyr OT Occupational Therapist               Goals/Plan     Row Name 12/15/21 1010          Transfer Goal 1 (OT)    Activity/Assistive Device (Transfer Goal 1, OT) transfers, all; toilet; shower chair  -     [place] Level/Cues Needed (Transfer Goal 1, OT) modified independence  [facility]     Time Frame (Transfer Goal 1, OT) short term goal (STG); 2 weeks  -     Progress/Outcome (Transfer Goal 1, OT) goal ongoing  -JW     Row Name 12/15/21 1010          Bathing Goal 1 (OT)    Activity/Device (Bathing Goal 1, OT) bathing skills, all; grab bar, tub/shower; hand-held shower spray hose; shower chair  -     [place] Level/Cues Needed (Bathing Goal 1, OT) modified [facility]     Time  Frame (Bathing Goal 1, OT) short term goal (STG); 2 weeks  -     Progress/Outcomes (Bathing Goal 1, OT) goal ongoing  -JW     Row Name 12/15/21 1010          Dressing Goal 1 (OT)    Activity/Device (Dressing Goal 1, OT) dressing skills, all; lower body dressing  -     [place]/Cues Needed (Dressing Goal 1, OT) modified independence  -     Time Frame (Dressing Goal 1, OT) short term goal (STG); 2 weeks  -     Progress/Outcome (Dressing Goal 1, OT) goal ongoing  -JW     Row Name 12/15/21 1010          Strength Goal 1 (OT)    Strength Goal 1 (OT) Pt will participate in UB therex to promote strength for ADLs and mobility.  -     Time Frame (Strength Goal 1, OT) short term goal (STG); 2 weeks  -     Progress/Outcome (Strength Goal 1, OT) goal ongoing  -JW     Row Name 12/15/21 1010          Therapy Assessment/Plan (OT)    Planned Therapy Interventions (OT) activity tolerance training; BADL retraining; functional balance retraining; occupation/activity based interventions; strengthening exercise; transfer/mobility retraining  -           User Key  (r) = Recorded By, (t) = Taken By, (c) = Cosigned By    Initials Name Provider Type     Gaby Cyr OT Occupational Therapist               Clinical Impression     Row Name 12/15/21 0901          Pain Assessment    Additional Documentation Pain Scale: Numbers Pre/Post-Treatment (Group)  -JW     Row Name 12/15/21 0901          Pain Scale: Numbers Pre/Post-Treatment    Pretreatment Pain Rating 0/10 - no pain  -JW     Row Name 12/15/21 0901          Plan of Care Review    Plan of Care Reviewed With patient  -     Outcome Summary Pt is a 67 y/o male who presents to [facility] with abdominal pain, nausea and vomiting.  He is admitted for acute on chronic systolic and diastolic heart failure and bilateral pleural effusions. Plans for thoracentesis today. H/O: CVA, CAD s/p CABG, CHF, GIB. Pt lives with his wife and kids and reports being independent with ADLs and fxl  mobility using a SP cane. Does report 1 recent fall. Pt presents A&Ox4, pleasant and agreeable, dec endurance, activity tolerance, strength and balance during assessment. Rere for bed mobility, STS with SBA using SP cane. Pt performs fxl ambulation around the room with slight unsteadiness, DE LEON noted and req's 1 standing rest break. Pt completes oral care standing at the sink, ambulates back around bed to chair. Pt left [facility] with all needs in reach. Will benefit from OT services in the acute care setting to address fxl deficits, recommend d/c home with family assist as needed.  -     Row Name 12/15/21 0901          Therapy Assessment/Plan (OT)    Rehab Potential (OT) good, to achieve stated therapy goals  -     Criteria for Skilled Therapeutic Interventions Met (OT) yes; skilled treatment is necessary  -     Therapy Frequency (OT) 3 times/wk  -     Row Name 12/15/21 0901          Therapy Plan Review/Discharge Plan (OT)    Anticipated Discharge Disposition (OT) home with assist  -     Row Name 12/15/21 0901          Positioning and Restraints    Pre-Treatment Position in bed  -     Post Treatment Position chair  -JW     In Chair sitting; call light within reach; encouraged to call for assist; exit alarm on; legs elevated  -           User Key  (r) = Recorded By, (t) = Taken By, (c) = Cosigned By    Initials Name Provider Type    Gaby Rai, DANGELO Occupational Therapist               Outcome Measures     Row Name 12/15/21 1013          How much help from another is currently needed...    Putting on and taking off regular lower body clothing? 3  -JW     Bathing (including washing, rinsing, and drying) 3  -     Toileting (which includes using toilet bed pan or urinal) 3  -JW     Putting on and taking off regular upper body clothing 3  -JW     Taking care of personal grooming (such as brushing teeth) 3  -JW     Eating meals 4  -JW     AM-PAC 6 Clicks Score (OT) 19  -     Row Name 12/15/21 1013           Functional Assessment    Outcome Measure Options AM-PAC 6 Clicks Daily Activity (OT)  -           User Key  (r) = Recorded By, (t) = Taken By, (c) = Cosigned By    Initials Name Provider Type    Gaby Rai OT Occupational Therapist                Occupational Therapy Education                 Title: PT OT SLP Therapies (In Progress)     Topic: Occupational Therapy (In Progress)     Point: ADL training (Done)     Description:   Instruct learner(s) on proper safety adaptation and remediation techniques during self care or transfers.   Instruct in proper use of assistive devices.              Learning Progress Summary           Patient Acceptance, E, VU by CONNOR at 12/15/2021 1014    Comment: role of OT, plan of care, home safety                   Point: Home exercise program (Not Started)     Description:   Instruct learner(s) on appropriate technique for monitoring, assisting and/or progressing therapeutic exercises/activities.              Learner Progress:  Not documented in this visit.          Point: Precautions (Done)     Description:   Instruct learner(s) on prescribed precautions during self-care and functional transfers.              Learning Progress Summary           Patient Acceptance, E, VU by CONNOR at 12/15/2021 1014    Comment: role of OT, plan of care, home safety                   Point: Body mechanics (Done)     Description:   Instruct learner(s) on proper positioning and spine alignment during self-care, functional mobility activities and/or exercises.              Learning Progress Summary           Patient Acceptance, E, VU by  at 12/15/2021 1014    Comment: role of OT, plan of care, home safety                               User Key     Initials Effective Dates Name Provider Type [facility] 06/10/21 -  Gaby Cyr OT Occupational Therapist OT              OT Recommendation and Plan  Planned Therapy Interventions (OT): activity tolerance training, BADL retraining, functional balance  retraining, occupation/activity based interventions, strengthening exercise, transfer/mobility retraining  Therapy Frequency (OT): 3 times/wk  Plan of Care Review  Plan of Care Reviewed With: patient  Outcome Summary: Pt is a 67 y/o male who presents to [facility] with abdominal pain, nausea and vomiting.  He is admitted for acute on chronic systolic and diastolic heart failure and bilateral pleural effusions. Plans for thoracentesis today. H/O: CVA, CAD s/p CABG, CHF, GIB. Pt lives with his wife and kids and reports being independent with ADLs and fxl mobility using a SP cane. Does report 1 recent fall. Pt presents A&Ox4, pleasant and agreeable, dec endurance, activity tolerance, strength and balance during assessment. Rere for bed mobility, STS with SBA using SP cane. Pt performs fxl ambulation around the room with slight unsteadiness, DE LEON noted and req's 1 standing rest break. Pt completes oral care standing at the sink, ambulates back around bed to chair. Pt left [facility] with all needs in reach. Will benefit from OT services in the acute care setting to address fxl deficits, recommend d/c home with family assist as needed.     Time Calculation:    Time Calculation- OT     Row Name 12/15/21 1015             Time Calculation- OT    OT Start Time 0842  -      OT Stop Time 0855  -      OT Time Calculation (min) 13 min  -      Total Timed Code Minutes- OT 8 minute(s)  -      OT Received On 12/15/21  -      OT - Next Appointment 12/17/21  -      OT Goal Re-Cert Due Date 12/29/21  -              Timed Charges    84109 - OT Self Care/Mgmt Minutes 8  -              Untimed Charges    OT Eval/Re-eval Minutes 5  -JW              Total Minutes    Timed Charges Total Minutes 8  -      Untimed Charges Total Minutes 5  -JW       Total Minutes 13  -            User Key  (r) = Recorded By, (t) = Taken By, (c) = Cosigned By    Initials Name Provider Type    Gaby Rai, DANGELO Occupational Therapist              Therapy  Charges for Today     Code Description Service Date Service Provider Modifiers Qty    68038316138 HC OT SELF CARE/MGMT/TRAIN EA 15 MIN 12/15/2021 Gaby Cyr OT GO 1    80062206547 HC OT EVAL MOD COMPLEXITY 2 12/15/2021 Gaby Cyr OT GO 1               Gaby Cyr OT  12/15/2021

## 2021-12-15 NOTE — PLAN OF CARE
Goal Outcome Evaluation:  Plan of Care Reviewed With: patient           Outcome Summary: VSS. Pt to have thoracentesis today. IV lasix given. Output recorded when using urinal. Pt denies pain or nausea. Up to BR with stand-by assist. Stool sample obtained--occult neg, Gi panel pending. Pt does not have c-pap from home (wife to bring), 2L NC placed while sleeping. Will CTM, safety maintained.

## 2021-12-15 NOTE — PLAN OF CARE
Goal Outcome Evaluation:  Thoracentesis today, 2L off. C/o headache and some mild pain and nausea. Resolved with zofran and tylenol. Still on RA. Need stool sample. Urine was sent down today. Evening coreg held for bradycardia and borderline bp. Restarted eliquis today.

## 2021-12-15 NOTE — PLAN OF CARE
Goal Outcome Evaluation:  Plan of Care Reviewed With: patient     Outcome Summary: Pt is a 65 y/o male who presents to [facility] with abdominal pain, nausea and vomiting.  He is admitted for acute on chronic systolic and diastolic heart failure and bilateral pleural effusions. Plans for thoracentesis today. H/O: CVA, CAD s/p CABG, CHF, GIB. Pt lives with his wife and kids and reports being independent with ADLs and fxl mobility using a SP cane. Does report 1 recent fall. Pt presents A&Ox4, pleasant and agreeable, dec endurance, activity tolerance, strength and balance during assessment. Rere for bed mobility, STS with SBA using SP cane. Pt performs fxl ambulation around the room with slight unsteadiness, DE LEON noted and req's 1 standing rest break. Pt completes oral care standing at the sink, ambulates back around bed to chair. Pt left [facility] with all needs in reach. Will benefit from OT services in the acute care setting to address fxl deficits, recommend d/c home with family assist as needed.      Patient was not wearing a face mask during this therapy encounter. Therapist used appropriate personal protective equipment including mask, gloves, and eye protection. Hand hygiene was completed before and after therapy session.

## 2021-12-15 NOTE — THERAPY EVALUATION
Patient Name: Carlito Mo  : 1955    MRN: [id number]                              Today's Date: 12/15/2021       Admit Date: 2021    Visit Dx:     ICD-10-CM ICD-9-CM   1. Lower abdominal pain  R10.30 789.09   2. Mild shortness of breath  R06.02 786.05   3. Elevated brain natriuretic peptide (BNP) level  R79.89 790.99   4. Stage 3 chronic kidney disease, unspecified whether stage 3a or 3b CKD (HCC)  N18.30 585.3   5. Pleural effusion  J90 511.9     Patient Active Problem List   Diagnosis   • Major depressive disorder with single episode, in partial remission (HCC)   • Other hyperlipidemia   • Vitamin D deficiency   • Erectile dysfunction   • Chronic fatigue   • Type 2 diabetes mellitus with ophthalmic complication ([facility])   • Skin lesion of chest wall   • Slow transit constipation   • Benign prostatic hyperplasia with nocturia   • Stage 3b chronic kidney disease (HCC)   • History of basal cell carcinoma   • High blood pressure associated with diabetes ([facility])   • Acquired hypothyroidism   • Hypertensive urgency   • Recurrent strokes ([facility])   • Urinary retention   • Pneumonia   • Acute on chronic combined systolic and diastolic congestive heart failure (HCC)   • Acute respiratory failure with hypoxia ([facility])   • Suspected sleep apnea   • Pleural effusion   • YAW (obstructive sleep apnea)   • Coronary artery disease involving native coronary artery of native heart without angina pectoris   • Chronically elevated troponin   • Colon cancer screening   • Enlarged lymph nodes   • Functional gait abnormality   • History of myocardial infarction   • Hospital discharge follow-up   • Insomnia   • Iron deficiency anemia   • Major depression, recurrent (HCC)   • Anemia, chronic renal failure, stage 3 (moderate) (HCC)   • Essential hypertension   • Adverse effect of iron and its compounds, initial encounter   • Acute on chronic renal insufficiency   • Debility   • Falls frequently   • Acute pain of right shoulder   •  Acute on chronic anemia   • Heme positive stool   • Neurogenic orthostatic hypotension (HCC)   • Symptomatic anemia   • History of colon polyps   • Helicobacter pylori gastritis   • Esophagitis   • Sleep related hypoxia   • Embolic stroke (HCC)   • Patent foramen ovale   • Pleural effusion, bilateral   • Cardiomyopathy (HCC)   • Lower abdominal pain   • Diarrhea     Past Medical History:   Diagnosis Date   • Acquired hypothyroidism    • Acute congestive heart failure (HCC)    • Acute respiratory failure with hypoxia (HCC)    • Acute sinusitis    • SYLVAIN (acute kidney injury) (HCC)     on CKD   • Anemia    • Arthritis    • CAD (coronary artery disease)    • Cancer (HCC)     skin   • Chronic combined systolic and diastolic congestive heart failure (HCC)    • Chronic ischemic heart disease    • CKD (chronic kidney disease)    • COPD (chronic obstructive pulmonary disease) (HCC)    • Depression    • Diabetes mellitus type 2, uncontrolled, without complications    • Disease of thyroid gland    • Fatigue    • Frequent PVCs    • Heart attack (HCC)    • History of coronary artery disease     with remote history of bypass surgery in 2001   • History of transfusion    • Hyperglycemia    • Hyperlipidemia    • Hypertension    • Hypertensive encephalopathy    • Mental status change     Acute   • YAW on CPAP    • Pleural effusion    • Pneumonia    • RBBB (right bundle branch block)    • Screening for prostate cancer 2011   • Stroke (HCC)    • TIA (transient ischemic attack)    • Type 2 diabetes mellitus (HCC)    • Urinary retention    • Vitamin D deficiency      Past Surgical History:   Procedure Laterality Date   • APPENDECTOMY N/A 02/14/2016    Dr. Alexey Dodson   • COLONOSCOPY      over 20 years ago.  no polyps    • COLONOSCOPY N/A 9/18/2018    Procedure: COLONOSCOPY;  Surgeon: Jose Enrique Louie MD;  Location: [facility] ENDOSCOPY;  Service: Gastroenterology   • COLONOSCOPY N/A 9/19/2018    IH, EH, polyps (TAs w/low grade  dysplasia)   • COLONOSCOPY N/A 6/1/2020    Procedure: COLONOSCOPY;  Surgeon: Jose Enrique Louie MD;  Location:  [facility];  Service: Gastroenterology;  Laterality: N/A;  Pre op-Anemia, Melena, History of Polyps  Post op-To Cecum/TI, Polyp, Poor Prep   • CORONARY ARTERY BYPASS GRAFT  11/2001   • ENDOSCOPY N/A 5/29/2020    Procedure: ESOPHAGOGASTRODUODENOSCOPY with biopsies;  Surgeon: Amanda Lovelace MD;  Location:  [facility]OSCOPY;  Service: Gastroenterology;  Laterality: N/A;  pre-anemia, dark stools  post-esophagitis, hiatal hernia   • ENDOSCOPY N/A 9/15/2020    Procedure: ESOPHAGOGASTRODUODENOSCOPY WITH BIOPSIES;  Surgeon: Jose Enrique Louie MD;  Location:  [facility];  Service: Gastroenterology;  Laterality: N/A;  pre: history of melena and esophagitis  post: mild esophagitis and gastritis, small hiatal hernia   • HERNIA REPAIR Left 12/05/2019   • TONSILLECTOMY     • VASECTOMY        General Information     Row Name 12/15/21 1014          Physical Therapy Time and Intention    Document Type evaluation  -EM     Mode of Treatment individual therapy; physical therapy  -EM     Row Name 12/15/21 1014          General Information    Patient Profile Reviewed yes  -EM     Prior Level of Function independent:  uses a cane  -EM     Existing Precautions/Restrictions fall  -EM     Barriers to Rehab none identified  -EM     Row Name 12/15/21 1014          Living Environment    Lives With child(carlos), adult; spouse  -EM     Row Name 12/15/21 1014          Home Main Entrance    Number of Stairs, Main Entrance three  -EM     Row Name 12/15/21 1014          Stairs Within Home, Primary    Stairs, Within Home, Primary pt states he lives in trilevel home  -EM     Row Name 12/15/21 1014          Cognition    Orientation Status (Cognition) oriented x 4  -EM     Row Name 12/15/21 1014          Safety Issues, Functional Mobility    Impairments Affecting Function (Mobility) balance; endurance/activity tolerance;  strength; shortness of breath  -EM           User Key  (r) = Recorded By, (t) = Taken By, (c) = Cosigned By    Initials Name Provider Type    Mary Jo Manuel PT Physical Therapist               Mobility     Row Name 12/15/21 1015          Bed Mobility    Comment (Bed Mobility) not tested, up in chair  -EM     Row Name 12/15/21 1015          Sit-Stand Transfer    Sit-Stand [place] (Transfers) standby assist  -EM     Assistive Device (Sit-Stand Transfers) cane, straight  -EM     Row Name 12/15/21 1015          Gait/Stairs (Locomotion)    [place] Level (Gait) contact guard  -EM     Assistive Device (Gait) cane, straight  -EM     Distance in Feet (Gait) 200  -EM     Deviations/Abnormal Patterns (Gait) gait speed decreased; stride length decreased  -EM           User Key  (r) = Recorded By, (t) = Taken By, (c) = Cosigned By    Initials Name Provider Type    Mary Jo Manuel PT Physical Therapist               Obj/Interventions     Row Name 12/15/21 1017          Range of Motion Comprehensive    General Range of Motion no range of motion deficits identified  -EM     [facility] Name 12/15/21 1017          Strength Comprehensive (MMT)    General Manual Muscle Testing (MMT) Assessment other (see comments)  -EM     Comment, General Manual Muscle Testing (MMT) Assessment generalized weakness noted, no focal deficits identified  -EM     [facility] Name 12/15/21 1017          Balance    Static Sitting Balance WNL  -EM     Dynamic Sitting Balance WNL  -EM     Static Standing Balance WNL  -EM     Dynamic Standing Balance mild impairment  -EM     [facility] Name 12/15/21 1017          Sensory Assessment (Somatosensory)    Sensory Assessment (Somatosensory) sensation intact  -EM           User Key  (r) = Recorded By, (t) = Taken By, (c) = Cosigned By    Initials Name Provider Type    Mary Jo Manuel PT Physical Therapist               Goals/Plan     Row Name 12/15/21 1027          Gait Training Goal 1 (PT)     Activity/Assistive Device (Gait Training Goal 1, PT) gait (walking locomotion); cane, straight  -EM     [place] Level (Gait Training Goal 1, PT) standby assist  -EM     Distance (Gait Training Goal 1, PT) 200  -EM     Time Frame (Gait Training Goal 1, PT) 1 week  -EM           User Key  (r) = Recorded By, (t) = Taken By, (c) = Cosigned By    Initials Name Provider Type    EM Mary Jo Hernandez, PT Physical Therapist               Clinical Impression     Row Name 12/15/21 1019          Pain Scale: Numbers Pre/Post-Treatment    Pretreatment Pain Rating 0/10 - no pain  -EM     Row Name 12/15/21 1019          Plan of Care Review    Plan of Care Reviewed With patient  -EM     Outcome Summary Patient is 65 yo male admitted to [facility] with acute on chronic heart failure. PMH significant for DM, CAD, CVA, HTN. Patient lives with family, reports 3 steps to enter home, tri-level home. Patient uses a cane at baseline. Today, patient performed sit to stand with SBA, ambulated 200 feet with STC and CGA. Patient demonstrates impairments consisting of generalized weakness, decreased activity tolerance and may benefit from skilled PT. Anticipate patient will return home at d/c.  -EM     Row Name 12/15/21 1019          Therapy Assessment/Plan (PT)    Patient/Family Therapy Goals Statement (PT) go home  -EM     Rehab Potential (PT) good, to achieve stated therapy goals  -EM     Criteria for Skilled Interventions Met (PT) yes; skilled treatment is necessary  -EM     Row Name 12/15/21 1019          Positioning and Restraints    Pre-Treatment Position sitting in chair/recliner  -EM     Post Treatment Position chair  -EM     In Chair reclined; call light within reach; exit alarm on  -EM           User Key  (r) = Recorded By, (t) = Taken By, (c) = Cosigned By    Initials Name Provider Type    EM Mary Jo Hernandez, PT Physical Therapist               Outcome Measures     Row Name 12/15/21 1027          How much help from another  person do you currently need...    Turning from your back to your side while in flat bed without using bedrails? 4  -EM     Moving from lying on back to sitting on the side of a flat bed without bedrails? 3  -EM     Moving to and from a bed to a chair (including a wheelchair)? 3  -EM     Standing up from a chair using your arms (e.g., wheelchair, bedside chair)? 3  -EM     Climbing 3-5 steps with a railing? 3  -EM     To walk in hospital room? 3  -EM     AM-PAC 6 Clicks Score (PT) 19  -EM     Row Name 12/15/21 1027 12/15/21 1013       Functional Assessment    Outcome Measure Options AM-PAC 6 Clicks Basic Mobility (PT)  -EM AM-PAC 6 Clicks Daily Activity (OT)  -          User Key  (r) = Recorded By, (t) = Taken By, (c) = Cosigned By    Initials Name Provider Type     Mary Jo Hernandez, PT Physical Therapist    Gaby Rai OT Occupational Therapist                             Physical Therapy Education                 Title: PT OT SLP Therapies (In Progress)     Topic: Physical Therapy (In Progress)     Point: Mobility training (Done)     Learning Progress Summary           Patient Acceptance, E, VU by EM at 12/15/2021 1028                   Point: Home exercise program (Not Started)     Learner Progress:  Not documented in this visit.          Point: Body mechanics (Not Started)     Learner Progress:  Not documented in this visit.          Point: Precautions (Not Started)     Learner Progress:  Not documented in this visit.                      User Key     Initials Effective Dates Name Provider Type Discipline     06/16/21 -  Mary Jo Hernandez, PT Physical Therapist PT              PT Recommendation and Plan  Planned Therapy Interventions (PT): gait training, home exercise program, patient/family education  Plan of Care Reviewed With: patient  Outcome Summary: Patient is 65 yo male admitted [facility] with acute on chronic heart failure. PMH significant for DM, CAD, CVA, HTN. Patient lives with family,  reports 3 steps to enter home, tri-level home. Patient uses a cane at baseline. Today, patient performed sit to stand with SBA, ambulated 200 feet with STC and CGA. Patient demonstrates impairments consisting of generalized weakness, decreased activity tolerance and may benefit from skilled PT. Anticipate patient will return home at d/c.     Time Calculation:    PT Charges     Row Name 12/15/21 1030             Time Calculation    Start Time 0933  -EM      Stop Time 0949  -EM      Time Calculation (min) 16 min  -EM      PT Received On 12/15/21  -EM      PT - Next Appointment 12/17/21  -EM      PT Goal Re-Cert Due Date 12/22/21  -EM              Time Calculation- PT    Total Timed Code Minutes- PT 8 minute(s)  -EM              Timed Charges    73637 - PT Therapeutic Activity Minutes 8  -EM              Total Minutes    Timed Charges Total Minutes 8  -EM       Total Minutes 8  -EM            User Key  (r) = Recorded By, (t) = Taken By, (c) = Cosigned By    Initials Name Provider Type    EM Mary Jo Hernandez, PT Physical Therapist              Therapy Charges for Today     Code Description Service Date Service Provider Modifiers Qty    38054149886 HC PT THERAPEUTIC ACT EA 15 MIN 12/15/2021 Mary Jo Hernandez, PT GP 1    07291348655 HC PT EVAL MOD COMPLEXITY 2 12/15/2021 Mary Jo Hernandez, PT GP 1          PT G-Codes  Outcome Measure Options: AM-PAC 6 Clicks Basic Mobility (PT)  AM-PAC 6 Clicks Score (PT): 19  AM-PAC 6 Clicks Score (OT): 19    Mary Jo Hernandez PT  12/15/2021

## 2021-12-16 PROBLEM — N18.4 CKD (CHRONIC KIDNEY DISEASE) STAGE 4, GFR 15-29 ML/MIN: Status: ACTIVE | Noted: 2021-12-16

## 2021-12-16 LAB
25(OH)D3 SERPL-MCNC: 15.5 NG/ML (ref 30–100)
ANION GAP SERPL CALCULATED.3IONS-SCNC: 7.6 MMOL/L (ref 5–15)
BUN SERPL-MCNC: 39 MG/DL (ref 8–23)
BUN/CREAT SERPL: 12.7 (ref 7–25)
CALCIUM SPEC-SCNC: 8.2 MG/DL (ref 8.6–10.5)
CHLORIDE SERPL-SCNC: 106 MMOL/L (ref 98–107)
CO2 SERPL-SCNC: 23.4 MMOL/L (ref 22–29)
CREAT SERPL-MCNC: 3.07 MG/DL (ref 0.76–1.27)
CYTO UR: NORMAL
DEPRECATED RDW RBC AUTO: 42.7 FL (ref 37–54)
ERYTHROCYTE [DISTWIDTH] IN BLOOD BY AUTOMATED COUNT: 13.8 % (ref 12.3–15.4)
FERRITIN SERPL-MCNC: 237 NG/ML (ref 30–400)
FOLATE SERPL-MCNC: 12.9 NG/ML (ref 4.78–24.2)
GFR SERPL CREATININE-BSD FRML MDRD: 20 ML/MIN/1.73
GLUCOSE BLDC GLUCOMTR-MCNC: 164 MG/DL (ref 70–130)
GLUCOSE BLDC GLUCOMTR-MCNC: 165 MG/DL (ref 70–130)
GLUCOSE BLDC GLUCOMTR-MCNC: 194 MG/DL (ref 70–130)
GLUCOSE BLDC GLUCOMTR-MCNC: 67 MG/DL (ref 70–130)
GLUCOSE BLDC GLUCOMTR-MCNC: 72 MG/DL (ref 70–130)
GLUCOSE SERPL-MCNC: 62 MG/DL (ref 65–99)
HCT VFR BLD AUTO: 31.1 % (ref 37.5–51)
HGB BLD-MCNC: 9.6 G/DL (ref 13–17.7)
IGA SERPL-MCNC: 188 MG/DL (ref 61–437)
IGG SERPL-MCNC: 518 MG/DL (ref 603–1613)
IGM SERPL-MCNC: 93 MG/DL (ref 20–172)
IRON 24H UR-MRATE: 35 MCG/DL (ref 59–158)
IRON SATN MFR SERPL: 14 % (ref 20–50)
KAPPA LC FREE SER-MCNC: 47.6 MG/L (ref 3.3–19.4)
KAPPA LC FREE/LAMBDA FREE SER: 1.43 {RATIO} (ref 0.26–1.65)
LAB AP CASE REPORT: NORMAL
LAMBDA LC FREE SERPL-MCNC: 33.3 MG/L (ref 5.7–26.3)
MCH RBC QN AUTO: 26.2 PG (ref 26.6–33)
MCHC RBC AUTO-ENTMCNC: 30.9 G/DL (ref 31.5–35.7)
MCV RBC AUTO: 84.7 FL (ref 79–97)
PATH REPORT.FINAL DX SPEC: NORMAL
PATH REPORT.GROSS SPEC: NORMAL
PLATELET # BLD AUTO: 240 10*3/MM3 (ref 140–450)
PMV BLD AUTO: 10.8 FL (ref 6–12)
POTASSIUM SERPL-SCNC: 4.4 MMOL/L (ref 3.5–5.2)
PROT PATTERN SERPL IFE-IMP: ABNORMAL
PTH-INTACT SERPL-MCNC: 113 PG/ML (ref 15–65)
RBC # BLD AUTO: 3.67 10*6/MM3 (ref 4.14–5.8)
SODIUM SERPL-SCNC: 137 MMOL/L (ref 136–145)
TIBC SERPL-MCNC: 250 MCG/DL (ref 298–536)
TRANSFERRIN SERPL-MCNC: 168 MG/DL (ref 200–360)
WBC NRBC COR # BLD: 5.32 10*3/MM3 (ref 3.4–10.8)

## 2021-12-16 PROCEDURE — 99232 SBSQ HOSP IP/OBS MODERATE 35: CPT | Performed by: INTERNAL MEDICINE

## 2021-12-16 PROCEDURE — 82306 VITAMIN D 25 HYDROXY: CPT | Performed by: INTERNAL MEDICINE

## 2021-12-16 PROCEDURE — 82746 ASSAY OF FOLIC ACID SERUM: CPT | Performed by: INTERNAL MEDICINE

## 2021-12-16 PROCEDURE — 25010000002 EPOETIN ALFA-EPBX 10000 UNIT/ML SOLUTION: Performed by: INTERNAL MEDICINE

## 2021-12-16 PROCEDURE — 80048 BASIC METABOLIC PNL TOTAL CA: CPT | Performed by: STUDENT IN AN ORGANIZED HEALTH CARE EDUCATION/TRAINING PROGRAM

## 2021-12-16 PROCEDURE — 85027 COMPLETE CBC AUTOMATED: CPT | Performed by: STUDENT IN AN ORGANIZED HEALTH CARE EDUCATION/TRAINING PROGRAM

## 2021-12-16 PROCEDURE — 83540 ASSAY OF IRON: CPT | Performed by: INTERNAL MEDICINE

## 2021-12-16 PROCEDURE — 84466 ASSAY OF TRANSFERRIN: CPT | Performed by: INTERNAL MEDICINE

## 2021-12-16 PROCEDURE — 82728 ASSAY OF FERRITIN: CPT | Performed by: STUDENT IN AN ORGANIZED HEALTH CARE EDUCATION/TRAINING PROGRAM

## 2021-12-16 PROCEDURE — 82962 GLUCOSE BLOOD TEST: CPT

## 2021-12-16 PROCEDURE — 63710000001 INSULIN LISPRO (HUMAN) PER 5 UNITS: Performed by: NURSE PRACTITIONER

## 2021-12-16 PROCEDURE — 83970 ASSAY OF PARATHORMONE: CPT | Performed by: INTERNAL MEDICINE

## 2021-12-16 RX ADMIN — LISINOPRIL 20 MG: 20 TABLET ORAL at 08:19

## 2021-12-16 RX ADMIN — TAMSULOSIN HYDROCHLORIDE 0.4 MG: 0.4 CAPSULE ORAL at 20:29

## 2021-12-16 RX ADMIN — FERROUS SULFATE TAB 325 MG (65 MG ELEMENTAL FE) 325 MG: 325 (65 FE) TAB at 17:00

## 2021-12-16 RX ADMIN — CARVEDILOL 25 MG: 25 TABLET, FILM COATED ORAL at 17:00

## 2021-12-16 RX ADMIN — APIXABAN 5 MG: 5 TABLET, FILM COATED ORAL at 08:19

## 2021-12-16 RX ADMIN — ATORVASTATIN CALCIUM 80 MG: 80 TABLET, FILM COATED ORAL at 20:29

## 2021-12-16 RX ADMIN — FERROUS SULFATE TAB 325 MG (65 MG ELEMENTAL FE) 325 MG: 325 (65 FE) TAB at 08:19

## 2021-12-16 RX ADMIN — AMLODIPINE BESYLATE 5 MG: 5 TABLET ORAL at 08:19

## 2021-12-16 RX ADMIN — Medication 1000 MCG: at 08:18

## 2021-12-16 RX ADMIN — EPOETIN ALFA-EPBX 10000 UNITS: 10000 INJECTION, SOLUTION INTRAVENOUS; SUBCUTANEOUS at 12:23

## 2021-12-16 RX ADMIN — CARVEDILOL 25 MG: 25 TABLET, FILM COATED ORAL at 08:18

## 2021-12-16 RX ADMIN — INSULIN LISPRO 2 UNITS: 100 INJECTION, SOLUTION INTRAVENOUS; SUBCUTANEOUS at 22:06

## 2021-12-16 RX ADMIN — SODIUM CHLORIDE, PRESERVATIVE FREE 10 ML: 5 INJECTION INTRAVENOUS at 20:30

## 2021-12-16 RX ADMIN — FOLIC ACID 1000 MCG: 1 TABLET ORAL at 08:18

## 2021-12-16 RX ADMIN — LEVOTHYROXINE SODIUM 75 MCG: 0.07 TABLET ORAL at 06:35

## 2021-12-16 RX ADMIN — INSULIN LISPRO 2 UNITS: 100 INJECTION, SOLUTION INTRAVENOUS; SUBCUTANEOUS at 12:30

## 2021-12-16 RX ADMIN — TORSEMIDE 60 MG: 20 TABLET ORAL at 08:19

## 2021-12-16 RX ADMIN — ASPIRIN 81 MG: 81 TABLET, COATED ORAL at 08:18

## 2021-12-16 RX ADMIN — APIXABAN 5 MG: 5 TABLET, FILM COATED ORAL at 20:29

## 2021-12-16 RX ADMIN — INSULIN LISPRO 2 UNITS: 100 INJECTION, SOLUTION INTRAVENOUS; SUBCUTANEOUS at 17:00

## 2021-12-16 RX ADMIN — BUPROPION HYDROCHLORIDE 150 MG: 150 TABLET, EXTENDED RELEASE ORAL at 08:18

## 2021-12-16 NOTE — PROGRESS NOTES
"      [facility]    Patient Name: Carlito Mo  :1955  66 y.o.  LOS: 1  Encounter Provider: Klever Wei Jr, MD      Patient Care Team:  Florencia Caballero MD as PCP - General (Internal Medicine)  Amber Murguia MD as Consulting Physician (Cardiology)  [facility] as Pharmacist  OSeth Conte MD as Surgeon (General Surgery)  Kim Hoyos MD PhD as Consulting Physician (Hematology and Oncology)  Jerome Diallo MD as Referring Physician (Family Medicine)  Jose Enrique Louie MD as Consulting Physician (Gastroenterology)  Nima Huddleston MD as Consulting Physician (Nephrology)    Chief Complaint: Follow-up acute on chronic combined systolic and diastolic heart failure, large bilateral pleural effusions,    Interval History: Thoracentesis today.  Pulmonary complaints improved.       Objective   Vital Signs  Temp:  [97.5 °F (36.4 °C)-98.4 °F (36.9 °C)] 97.9 °F (36.6 °C)  Heart Rate:  [54-64] 59  Resp:  [16-18] 18  BP: ()/(58-86) 108/68    Intake/Output Summary (Last 24 hours) at 12/15/2021 1937  Last data filed at 12/15/2021 1746  Gross per 24 hour   Intake 990 ml   Output 3050 ml   Net -2060 ml     Flowsheet Rows      First Filed Value   Admission Height 182.9 cm (72\") Documented at 2021   Admission Weight 86.2 kg (190 lb) Documented at 2021            Physical Exam  Vitals reviewed.   Constitutional:       Appearance: Healthy appearance. Not in distress.   Eyes:      Conjunctiva/sclera: Conjunctivae normal.      Pupils: Pupils are equal, round, and reactive to light.   Neck:      Vascular: No JVR. JVD normal.   Pulmonary:      Effort: Pulmonary effort is normal.      Breath sounds: No wheezing. No rhonchi. Rales present.      Comments: Absent breath sounds bibasilar zones  Chest:      Chest wall: Not tender to palpatation.   Cardiovascular:      PMI at left midclavicular line. Normal rate. Regular rhythm. Normal S1. Normal S2.      " Murmurs: There is a systolic murmur.      No gallop. No click. No rub.   Pulses:     Intact distal pulses.   Edema:     Peripheral edema present.  Abdominal:      General: Bowel sounds are normal.      Palpations: Abdomen is soft.      Tenderness: There is no abdominal tenderness.   Musculoskeletal: Normal range of motion.         General: No tenderness. Skin:     General: Skin is warm and dry.   Neurological:      General: No focal deficit present.      Mental Status: Alert and oriented to person, place and time.     Physical exam was reviewed, updated and amended when necessary    Pertinent Test Results:  Results from last 7 days   Lab Units 12/15/21  0410 12/14/21  0421 12/13/21  1614   SODIUM mmol/L 142 139 138   POTASSIUM mmol/L 4.2 3.7 4.0   CHLORIDE mmol/L 109* 110* 107   CO2 mmol/L 24.1 21.8* 23.7   BUN mg/dL 29* 24* 26*   CREATININE mg/dL 2.65* 2.23* 2.64*   GLUCOSE mg/dL 73 104* 190*   CALCIUM mg/dL 8.1* 8.2* 9.0   AST (SGOT) U/L  --   --  13   ALT (SGPT) U/L  --   --  8         Results from last 7 days   Lab Units 12/15/21  0410 12/14/21  0421 12/13/21  1614   WBC 10*3/mm3 5.68 6.52 6.11   HEMOGLOBIN g/dL 9.4* 9.9* 11.7*   HEMATOCRIT % 29.5* 30.2* 37.0*   PLATELETS 10*3/mm3 234 233 283         Results from last 7 days   Lab Units 12/15/21  0410   MAGNESIUM mg/dL 1.8           Invalid input(s): LDLCALC  Results from last 7 days   Lab Units 12/13/21  1614   PROBNP pg/mL 7,027.0*     Results from last 7 days   Lab Units 12/14/21  0421   TSH uIU/mL 4.300*           Medication Review:   amLODIPine, 5 mg, Oral, Daily  apixaban, 5 mg, Oral, Q12H  aspirin, 81 mg, Oral, Daily  atorvastatin, 80 mg, Oral, Nightly  buPROPion XL, 150 mg, Oral, Daily  carvedilol, 25 mg, Oral, BID With Meals  ferrous sulfate, 325 mg, Oral, Daily With Breakfast & Dinner  folic acid, 1,000 mcg, Oral, Daily  insulin glargine, 4 Units, Subcutaneous, Nightly  insulin lispro, 0-9 Units, Subcutaneous, 4x Daily With Meals &  Nightly  levothyroxine, 75 mcg, Oral, Q AM  lisinopril, 20 mg, Oral, Daily  senna-docusate sodium, 2 tablet, Oral, Daily  sodium chloride, 10 mL, Intravenous, Q12H  tamsulosin, 0.4 mg, Oral, Nightly  torsemide, 60 mg, Oral, Daily  vitamin B-12, 1,000 mcg, Oral, Daily         hold, 1 each        Assessment/Plan   1. Acute on chronic systolic heart failure -secondary medical noncompliance.  Will continue diuretics per renal.    2 L removed with thoracentesis.    Pulmonary complaints are much improved. Will continue home lisinopril and beta-blocker.  EKG without acute ischemic changes.  No angina.  Counseled at length on need for lifestyle modifications and medical compliance.  2. CAD -no angina at this time.    Continue medical management.  3. Orthostatic hypotension -check orthostatics in the morning.  4. Bilateral pleural effusions -status post thoracentesis today  5. CKD -creatinine is approximately at baseline.    Nephrology following  6. History of stroke -on apixaban for prevention.  Not certain when his last dose was.    Eliquis was restarted.  7. History of GI bleed  8. Uncontrolled diabetes  9. YAW on CPAP  10. History of stroke -restart anticoagulation      Klever Wei Jr, MD  [facility]  12/15/21  19:37 EST

## 2021-12-16 NOTE — PROGRESS NOTES
NEPHROLOGY PROGRESS NOTE    PATIENT IDENTIFICATION:   Name:  Carlito Mo      MRN:  [id number]     66 y.o.  male             Reason for visit: SYLVAIN    SUBJECTIVE:     Seen and examined.  Had thoracentesis yesterday.  Currently sitting up in chair.  Feeling good.  Denies shortness of air        OBJECTIVE:  Vitals:    12/15/21 1930 12/15/21 2306 12/16/21 0348 12/16/21 0723   BP: 108/68 131/78  112/66   BP Location: Right arm Right arm  Right arm   Patient Position: Lying Lying  Lying   Pulse: 59 60  64   Resp: 18 18  18   Temp: 97.9 °F (36.6 °C) 97.8 °F (36.6 °C)  97.9 °F (36.6 °C)   TempSrc: Oral Oral  Oral   SpO2: 96% 97%     Weight:   84.6 kg (186 lb 8 oz)    Height:               Body mass index is 25.29 kg/m².    Intake/Output Summary (Last 24 hours) at 12/16/2021 0810  Last data filed at 12/16/2021 0348  Gross per 24 hour   Intake 780 ml   Output 2750 ml   Net -1970 ml         Exam:  GEN:  No distress, appears stated age  EYES:   Anicteric sclera  ENT:    External ears/nose normal, MM are moist  NECK:  No adenopathy, JVP none  LUNGS: Normal chest on inspection; not labored  CV:  Normal S1S2, without murmur  ABD:  Non-tender, non-distended, no hepatosplenomegaly, +BS  EXT:  No edema; no cyanosis; clubbing    Scheduled meds:  amLODIPine, 5 mg, Oral, Daily  apixaban, 5 mg, Oral, Q12H  aspirin, 81 mg, Oral, Daily  atorvastatin, 80 mg, Oral, Nightly  buPROPion XL, 150 mg, Oral, Daily  carvedilol, 25 mg, Oral, BID With Meals  ferrous sulfate, 325 mg, Oral, Daily With Breakfast & Dinner  folic acid, 1,000 mcg, Oral, Daily  insulin lispro, 0-9 Units, Subcutaneous, 4x Daily With Meals & Nightly  levothyroxine, 75 mcg, Oral, Q AM  lisinopril, 20 mg, Oral, Daily  senna-docusate sodium, 2 tablet, Oral, Daily  sodium chloride, 10 mL, Intravenous, Q12H  tamsulosin, 0.4 mg, Oral, Nightly  torsemide, 60 mg, Oral, Daily  vitamin B-12, 1,000 mcg, Oral, Daily      IV meds:                      hold, 1 each        Data  Review:    Results from last 7 days   Lab Units 12/16/21  0625 12/15/21  0410 12/14/21  0421 12/13/21  1614 12/13/21  1614   SODIUM mmol/L 137 142 139   < > 138   POTASSIUM mmol/L 4.4 4.2 3.7   < > 4.0   CHLORIDE mmol/L 106 109* 110*   < > 107   CO2 mmol/L 23.4 24.1 21.8*   < > 23.7   BUN mg/dL 39* 29* 24*   < > 26*   CREATININE mg/dL 3.07* 2.65* 2.23*   < > 2.64*   CALCIUM mg/dL 8.2* 8.1* 8.2*   < > 9.0   BILIRUBIN mg/dL  --   --   --   --  0.2   ALK PHOS U/L  --   --   --   --  86   ALT (SGPT) U/L  --   --   --   --  8   AST (SGOT) U/L  --   --   --   --  13   GLUCOSE mg/dL 62* 73 104*   < > 190*    < > = values in this interval not displayed.       Estimated Creatinine Clearance: 28.3 mL/min (A) (by C-G formula based on SCr of 3.07 mg/dL (H)).    Results from last 7 days   Lab Units 12/15/21  0410   MAGNESIUM mg/dL 1.8   PHOSPHORUS mg/dL 4.4       Results from last 7 days   Lab Units 12/16/21  0625 12/15/21  0410 12/14/21  0421 12/13/21  1614   WBC 10*3/mm3 5.32 5.68 6.52 6.11   HEMOGLOBIN g/dL 9.6* 9.4* 9.9* 11.7*   PLATELETS 10*3/mm3 240 234 233 283                   ASSESSMENT:     Acute on chronic combined systolic and diastolic congestive heart failure (HCC)    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)    Stage 3b chronic kidney disease (HCC)    Acquired hypothyroidism    Hypertensive urgency    Pleural effusion    YAW (obstructive sleep apnea)    Coronary artery disease involving native coronary artery of native heart without angina pectoris    Chronically elevated troponin    Anemia, chronic renal failure, stage 3 (moderate) (HCC)    Essential hypertension    Patent foramen ovale    Pleural effusion, bilateral    Cardiomyopathy (HCC)    Lower abdominal pain    Diarrhea      -Acute kidney injury on chronic kidney disease stage IV: His kidney function has been fluctuating but most recently had been in the range between 2 and 2.3 mg/dL.  Creatinine today is 2.6 mg/dL.  Patient appears to be  euvolemic.  He is going to undergo thoracentesis.  The patient is noncompliant and we had lengthy discussion about importance of compliance.     -Proteinuria: Most likely due to diabetic nephropathy.  Awaiting  free serum kappa light chain  -Pleural effusion:  Had thoracentesis yesterday December 15  -Diabetes mellitus type 2: Noncompliant with the treatment  -Hypertension  -Acute on chronic congestive heart failure last ejection fraction 31-35% with fixed restrictive diastolic congestive heart failure  -Anemia of chronic illness: Last hemoglobin 9.6.  Last iron profile appears to be adequate  -Coronary artery disease  -History of stroke  -History of obstructive sleep apnea  -History of noncompliance      PLAN:    Kidney function has worsened further to 3 mg/dL.  Will continue current dose of diuretic  Patient is likely heading toward dialysis in the near future  Continue to monitor kidney function and will hold on discharge that his kidney function starts at the level of  Discussed with the patient importance of compliance  Repeat iron profile.  Will  need HAI  Check PTH and vitamin D level  Surveillance labs    Vitor Parmar MD  12/16/2021    08:10 EST

## 2021-12-16 NOTE — PROGRESS NOTES
"[facility]    Patient Name: Carlito Mo  :1955  66 y.o.  LOS: 2  Encounter Provider: Klever Wei Jr, MD      Patient Care Team:  Florencia Caballero MD as PCP - General (Internal Medicine)  Amber Murguia MD as Consulting Physician (Cardiology)  Sera La [facility] as Pharmacist  OSeth Conte MD as Surgeon (General Surgery)  Kim Hoyos MD PhD as Consulting Physician (Hematology and Oncology)  Jerome Diallo MD as Referring Physician (Family Medicine)  Jose Enrique Louie MD as Consulting Physician (Gastroenterology)  Nima Huddleston MD as Consulting Physician (Nephrology)    Chief Complaint: Follow-up acute on chronic combined systolic and diastolic heart failure, bilateral pleural effusions, history of stroke on chronic anticoagulation    Interval History: No acute issues overnight.  Orthostatics negative.  Blood pressure and heart rate are well controlled.       Objective   Vital Signs  Temp:  [97.8 °F (36.6 °C)-97.9 °F (36.6 °C)] 97.9 °F (36.6 °C)  Heart Rate:  [59-64] 64  Resp:  [18] 18  BP: (108-131)/(66-78) 120/73    Intake/Output Summary (Last 24 hours) at 2021 1335  Last data filed at 2021 0920  Gross per 24 hour   Intake 700 ml   Output 650 ml   Net 50 ml     Flowsheet Rows      First Filed Value   Admission Height 182.9 cm (72\") Documented at 2021   Admission Weight 86.2 kg (190 lb) Documented at 2021            Physical Exam  Vitals reviewed.   Constitutional:       Appearance: Healthy appearance. Not in distress.   Eyes:      Conjunctiva/sclera: Conjunctivae normal.      Pupils: Pupils are equal, round, and reactive to light.   Neck:      Vascular: No JVR. JVD normal.   Pulmonary:      Effort: Pulmonary effort is normal.      Breath sounds: No wheezing. No rhonchi. Rales present.      Comments: Improved aeration with bibasilar zones  Chest:      Chest wall: Not tender to palpatation.   Cardiovascular:      PMI at " left midclavicular line. Normal rate. Regular rhythm. Normal S1. Normal S2.      Murmurs: There is a systolic murmur.      No gallop. No click. No rub.   Pulses:     Intact distal pulses.   Edema:     Peripheral edema present.  Abdominal:      General: Bowel sounds are normal.      Palpations: Abdomen is soft.      Tenderness: There is no abdominal tenderness.   Musculoskeletal: Normal range of motion.         General: No tenderness. Skin:     General: Skin is warm and dry.   Neurological:      General: No focal deficit present.      Mental Status: Alert and oriented to person, place and time.     Pertinent Test Results:  Results from last 7 days   Lab Units 12/16/21  0625 12/15/21  0410 12/14/21  0421 12/13/21  1614   SODIUM mmol/L 137 142 139 138   POTASSIUM mmol/L 4.4 4.2 3.7 4.0   CHLORIDE mmol/L 106 109* 110* 107   CO2 mmol/L 23.4 24.1 21.8* 23.7   BUN mg/dL 39* 29* 24* 26*   CREATININE mg/dL 3.07* 2.65* 2.23* 2.64*   GLUCOSE mg/dL 62* 73 104* 190*   CALCIUM mg/dL 8.2* 8.1* 8.2* 9.0   AST (SGOT) U/L  --   --   --  13   ALT (SGPT) U/L  --   --   --  8         Results from last 7 days   Lab Units 12/16/21  0625 12/15/21  0410 12/14/21  0421 12/13/21  1614   WBC 10*3/mm3 5.32 5.68 6.52 6.11   HEMOGLOBIN g/dL 9.6* 9.4* 9.9* 11.7*   HEMATOCRIT % 31.1* 29.5* 30.2* 37.0*   PLATELETS 10*3/mm3 240 234 233 283         Results from last 7 days   Lab Units 12/15/21  0410   MAGNESIUM mg/dL 1.8           Invalid input(s): LDLCALC  Results from last 7 days   Lab Units 12/13/21  1614   PROBNP pg/mL 7,027.0*     Results from last 7 days   Lab Units 12/14/21 0421   TSH uIU/mL 4.300*           Medication Review:   amLODIPine, 5 mg, Oral, Daily  apixaban, 5 mg, Oral, Q12H  aspirin, 81 mg, Oral, Daily  atorvastatin, 80 mg, Oral, Nightly  buPROPion XL, 150 mg, Oral, Daily  carvedilol, 25 mg, Oral, BID With Meals  epoetin lane/lane-epbx, 10,000 Units, Subcutaneous, Weekly  ferrous sulfate, 325 mg, Oral, Daily With Breakfast &  Dinner  folic acid, 1,000 mcg, Oral, Daily  insulin lispro, 0-9 Units, Subcutaneous, 4x Daily With Meals & Nightly  levothyroxine, 75 mcg, Oral, Q AM  lisinopril, 20 mg, Oral, Daily  senna-docusate sodium, 2 tablet, Oral, Daily  sodium chloride, 10 mL, Intravenous, Q12H  tamsulosin, 0.4 mg, Oral, Nightly  torsemide, 60 mg, Oral, Daily  vitamin B-12, 1,000 mcg, Oral, Daily         hold, 1 each        Assessment/Plan     1. Acute on chronic systolic heart failure -secondary medical noncompliance.  Will continue diuretics per renal.    2 L removed with thoracentesis.    Pulmonary complaints are much improved. Will continue home lisinopril and beta-blocker.  EKG without acute ischemic changes.  No angina.  Counseled at length on need for lifestyle modifications and medical compliance.  He is orthostatic negative.  Blood pressure and heart rate are extremely well controlled.  Renal function is slightly worse.  Followed by nephrology.  2. CAD -no angina at this time.    Continue medical management.  3. Orthostatic hypotension -orthostatic negative  4. Bilateral pleural effusions -status post thoracentesis today  5. CKD -creatinine is approximately at baseline.    Nephrology following  6. Uncontrolled diabetes  7. YAW on CPAP  8. History of stroke -continue anticoagulation        History of GI bleed    Cardiovascular issues are stable.  I will see the patient as needed.  He needs to continue lifestyle modification once leaving the hospital and we need to confirm as best we can that he has access to medications from a financial standpoint.    Klever Wei Jr, MD  [facility]  12/16/21  13:35 EST

## 2021-12-16 NOTE — PLAN OF CARE
Goal Outcome Evaluation:  Plan of Care Reviewed With: patient           Outcome Summary: VSS. Thoracentesis site soft and intact. Pt denies pain. 1L NC placed while sleeping, O2 sats maintained. Blood sugar low this morning, replacement given. Up to 72. Will CTM, safety maintained.

## 2021-12-16 NOTE — PROGRESS NOTES
Jorge Shukla MD                          [phone]      Patient ID:    Name:  Carlito Mo    MRN:  [id number]    1955   66 y.o.  male            Patient Care Team:  Florencia Caballero MD as PCP - General (Internal Medicine)  Amber Murguia MD as Consulting Physician (Cardiology)  Sera La RPH as Pharmacist  OSeth Conte MD as Surgeon (General Surgery)  Kim Hoyos MD PhD as Consulting Physician (Hematology and Oncology)  Jerome Diallo MD as Referring Physician (Family Medicine)  Jose Enrique Louie MD as Consulting Physician (Gastroenterology)  Nima Huddleston MD as Consulting Physician (Nephrology)    CC/ Reason for visit: Bilateral pleural effusions, congestive heart failure, CKD, medical noncompliance    Subjective: Pt seen and examined this AM. No acute overnight events noted.  Stable with regards to oxygen needs.  Noted nocturnal desaturations which is not uncommon with undiagnosed/treated sleep apnea.  Noted some issues with renal function while being diuresed.    ROS: Denies any subjective fevers, syncope or presyncopal events, new neurological deficits, nausea or vomiting currently    Objective     Vital Signs past 24hrs    BP range: BP: (108-131)/(66-78) 120/73  Pulse range: Heart Rate:  [59-64] 64  Resp rate range: Resp:  [18] 18  Temp range: Temp (24hrs), Av.9 °F (36.6 °C), Min:97.8 °F (36.6 °C), Max:97.9 °F (36.6 °C)      Ventilator/Non-Invasive Ventilation Settings (From admission, onward)             Start     Ordered    21 1356  NIPPV (CPAP or BIPAP)  Until Discontinued        Question Answer Comment   Indication: Sleep Apnea    Type: AutoCPAP    Min Pressure 10        21 1356                Device (Oxygen Therapy): room air       84.6 kg (186 lb 8 oz); Body mass index is 25.29 kg/m².      Intake/Output Summary (Last 24 hours) at 2021 1536  Last data filed at 2021 1320  Gross per 24 hour   Intake  940 ml   Output 550 ml   Net 390 ml       PHYSICAL EXAM   Constitutional: Middle aged pt in bed, No acute respiratory distress, + accessory muscle use  Head: - NCAT  Eyes: No pallor, Anicteric conjunctiva, EOMI.  ENMT:  Mallampati 3, no oral thrush. Moist MM.   NECK: Trachea midline, No thyromegaly, no palpable cervical LNpathy  Heart: RRR, no murmur. 1+ pedal edema   Lungs: HAYDEN +, decreased breath sound at the bases.  No wheezes/ crackles heard    Abdomen: Soft. No tenderness, guarding or rigidity. No palpable masses  Extremities: Extremities warm and well perfused. No cyanosis/ clubbing  Neuro: Conscious, answers appropriately, no gross focal neuro deficits  Psych: Mood and affect appropriate    PPE recommended per [facility] infectious disease Isolation protocol for the current clinical scenario(as mentioned below) was followed.     Scheduled meds:  amLODIPine, 5 mg, Oral, Daily  apixaban, 5 mg, Oral, Q12H  aspirin, 81 mg, Oral, Daily  atorvastatin, 80 mg, Oral, Nightly  buPROPion XL, 150 mg, Oral, Daily  carvedilol, 25 mg, Oral, BID With Meals  epoetin lane/lane-epbx, 10,000 Units, Subcutaneous, Weekly  ferrous sulfate, 325 mg, Oral, Daily With Breakfast & Dinner  folic acid, 1,000 mcg, Oral, Daily  insulin lispro, 0-9 Units, Subcutaneous, 4x Daily With Meals & Nightly  levothyroxine, 75 mcg, Oral, Q AM  lisinopril, 20 mg, Oral, Daily  senna-docusate sodium, 2 tablet, Oral, Daily  sodium chloride, 10 mL, Intravenous, Q12H  tamsulosin, 0.4 mg, Oral, Nightly  torsemide, 60 mg, Oral, Daily  vitamin B-12, 1,000 mcg, Oral, Daily        IV meds:                      hold, 1 each        Data Review:      Results from last 7 days   Lab Units 12/16/21  0625 12/15/21  0410 12/14/21  0421 12/13/21  1614 12/13/21  1614   SODIUM mmol/L 137 142 139   < > 138   POTASSIUM mmol/L 4.4 4.2 3.7   < > 4.0   CHLORIDE mmol/L 106 109* 110*   < > 107   CO2 mmol/L 23.4 24.1 21.8*   < > 23.7   BUN mg/dL 39* 29* 24*   < > 26*    CREATININE mg/dL 3.07* 2.65* 2.23*   < > 2.64*   CALCIUM mg/dL 8.2* 8.1* 8.2*   < > 9.0   BILIRUBIN mg/dL  --   --   --   --  0.2   ALK PHOS U/L  --   --   --   --  86   ALT (SGPT) U/L  --   --   --   --  8   AST (SGOT) U/L  --   --   --   --  13   GLUCOSE mg/dL 62* 73 104*   < > 190*   WBC 10*3/mm3 5.32 5.68 6.52   < > 6.11   HEMOGLOBIN g/dL 9.6* 9.4* 9.9*   < > 11.7*   PLATELETS 10*3/mm3 240 234 233   < > 283   PROBNP pg/mL  --   --   --   --  7,027.0*    < > = values in this interval not displayed.       Lab Results   Component Value Date    CALCIUM 8.2 (L) 12/16/2021    PHOS 4.4 12/15/2021       Results from last 7 days   Lab Units 12/15/21  1040   BODYFLDCX  No growth              Results Review:    I have reviewed the relevant laboratory results and independently reviewed the chest imaging from this hospitalization including the available echocardiogram reports personally and summarized it if/ when appropriate below    Assessment    Bilateral- Rt>Lt pl effusion s/p Rt thora- 2L; 12/15   Acute on chronic systolic heart failure  Medical noncompliance  Previous CVA on Eliquis; noncompliant  CKD  CAD s/p prior set CABG  Previous GI bleed  YAW on CPAP     RECOMMENDATIONS:  Patient stable from a respiratory standpoint and on room air.  Ongoing diuresis per nephrology.  Would hold off on left-sided thoracentesis for now until we have any respiratory issues.  We will follow up with chest x-ray.  Noted nephrology concerns regarding worsening renal function and potential need for dialysis unfortunately.  Okay for PAP at night if patient can get his home machine  Medical compliance recommended    Jorge Shukla MD  12/16/2021

## 2021-12-16 NOTE — PLAN OF CARE
Goal Outcome Evaluation:  Awaiting nephrology clearance for discharge. No BM today, still need sample. No complaints from patient.

## 2021-12-16 NOTE — PROGRESS NOTES
Name: Carlito Mo ADMIT: 2021   : 1955  PCP: Florencia Caballero MD    MRN: [id number] LOS: 2 days   AGE/SEX: 66 y.o. male  ROOM: [facility]/     Subjective   Subjective   Feeling improved after thoracentesis yesterday.  Still some lower extremity swelling but improving.  Appetite good.  Spoke on the phone with his wife    Review of Systems  Denies chest pain, abdominal pain, dysuria, fevers     Objective   Objective   Vital Signs  Temp:  [97.7 °F (36.5 °C)-97.9 °F (36.6 °C)] 97.9 °F (36.6 °C)  Heart Rate:  [54-64] 64  Resp:  [16-18] 18  BP: ()/(58-78) 120/73  SpO2:  [96 %-97 %] 97 %  on  Flow (L/min):  [1] 1;   Device (Oxygen Therapy): room air  Body mass index is 25.29 kg/m².  Physical Exam  Constitutional:       General: He is not in acute distress.     Appearance: He is not diaphoretic.      Comments: Chronically ill-appearing   HENT:      Mouth/Throat:      Mouth: Mucous membranes are moist.   Eyes:      General: No scleral icterus.  Cardiovascular:      Rate and Rhythm: Normal rate and regular rhythm.      Heart sounds: No murmur heard.  No gallop.    Pulmonary:      Effort: Pulmonary effort is normal. No respiratory distress.      Breath sounds: No wheezing or rhonchi.   Abdominal:      General: There is no distension.      Palpations: Abdomen is soft.      Tenderness: There is no abdominal tenderness. There is no guarding.   Skin:     General: Skin is warm and dry.   Neurological:      Mental Status: He is alert.         Results Review     I reviewed the patient's new clinical results.  Results from last 7 days   Lab Units 21  0625 12/15/21  0410 12/14/21  0421 12/13/21  1614   WBC 10*3/mm3 5.32 5.68 6.52 6.11   HEMOGLOBIN g/dL 9.6* 9.4* 9.9* 11.7*   PLATELETS 10*3/mm3 240 234 233 283     Results from last 7 days   Lab Units 21  0625 12/15/21  0410 21  1614   SODIUM mmol/L 137 142 139 138   POTASSIUM mmol/L 4.4 4.2 3.7 4.0   CHLORIDE mmol/L 106 109* 110* 107    CO2 mmol/L 23.4 24.1 21.8* 23.7   BUN mg/dL 39* 29* 24* 26*   CREATININE mg/dL 3.07* 2.65* 2.23* 2.64*   GLUCOSE mg/dL 62* 73 104* 190*   Estimated Creatinine Clearance: 28.3 mL/min (A) (by C-G formula based on SCr of 3.07 mg/dL (H)).  Results from last 7 days   Lab Units 12/13/21  1614   ALBUMIN g/dL 3.10*   BILIRUBIN mg/dL 0.2   ALK PHOS U/L 86   AST (SGOT) U/L 13   ALT (SGPT) U/L 8     Results from last 7 days   Lab Units 12/16/21  0625 12/15/21  0410 12/14/21  0421 12/13/21  1614   CALCIUM mg/dL 8.2* 8.1* 8.2* 9.0   ALBUMIN g/dL  --   --   --  3.10*   MAGNESIUM mg/dL  --  1.8  --   --    PHOSPHORUS mg/dL  --  4.4  --   --        COVID19   Date Value Ref Range Status   12/13/2021 Not Detected Not Detected - Ref. Range Final   05/20/2021 Not Detected Not Detected - Ref. Range Final     Hemoglobin A1C   Date/Time Value Ref Range Status   12/15/2021 0410 7.68 (H) 4.80 - 5.60 % Final     Glucose   Date/Time Value Ref Range Status   12/16/2021 0639 72 70 - 130 mg/dL Final     Comment:     Meter: [id number] : [id number] Jose Davidaston Lucero    12/16/2021 0615 67 (L) 70 - 130 mg/dL Final     Comment:     Meter: [id number] : [id number] Prakash Lucero    12/15/2021 2024 184 (H) 70 - 130 mg/dL Final     Comment:     Meter: [id number] : [id number] Jose Davidaston Lucero    12/15/2021 1602 186 (H) 70 - 130 mg/dL Final     Comment:     Meter: [id number] : [id number] Miguel Rene    12/15/2021 1127 97 70 - 130 mg/dL Final     Comment:     Meter: [id number] : [id number] Miguel RAMIREZ   12/15/2021 0609 93 70 - 130 mg/dL Final     Comment:     Meter: [id number] : [id number] Tegan RAMIREZ   12/14/2021 2126 169 (H) 70 - 130 mg/dL Final     Comment:     Meter: [id number] : [id number] Tegan RAMIREZ       XR Chest 2 View  Narrative: TWO VIEW CHEST X-RAY     HISTORY: Postop thoracentesis.     TECHNIQUE: A total of three x-ray images of the chest are provided and  are correlated with  images from a sonographically guided right  thoracentesis of 2 L performed earlier today and chest x-ray 12/13/2021.     FINDINGS: There are sternal wires. There has been significant  improvement in the appearance of the right hemithorax. The lateral view  shows persistent blunting of the costophrenic angle posteriorly but the  right pleural effusion is substantially decreased in size. There is also  a small residual left pleural effusion. Central pulmonary vasculature is  engorged and there is mild interstitial edema. There is no pneumothorax.     Impression: No pneumothorax following right thoracentesis. There is  persistent pulmonary vascular engorgement with mild interstitial edema.     US Thoracentesis  Narrative: ULTRASOUND-GUIDED RIGHT THORACENTESIS.     HISTORY: Pleural effusion.     After signed informed consent was obtained the patient was prepped and  draped in the usual sterile fashion. Lidocaine was used for local  anesthesia.     Ultrasound guidance was used to place a 5 Telugu Yueh catheter into the  right pleural fluid collection. 2 L of clear light yellow was removed.  Sample was sent to the lab.     Confirmatory images were obtained.     Patient tolerated the procedure well with no complications.     Impression: Ultrasound-guided right thoracentesis as described.        This report was finalized on 12/15/2021 11:02 AM by Dr. Fernando Marie M.D.       I reviewed the patient's daily medications.  Scheduled Medications  amLODIPine, 5 mg, Oral, Daily  apixaban, 5 mg, Oral, Q12H  aspirin, 81 mg, Oral, Daily  atorvastatin, 80 mg, Oral, Nightly  buPROPion XL, 150 mg, Oral, Daily  carvedilol, 25 mg, Oral, BID With Meals  epoetin lane/lane-epbx, 10,000 Units, Subcutaneous, Weekly  ferrous sulfate, 325 mg, Oral, Daily With Breakfast & Dinner  folic acid, 1,000 mcg, Oral, Daily  insulin lispro, 0-9 Units, Subcutaneous, 4x Daily With Meals & Nightly  levothyroxine, 75 mcg, Oral, Q AM  lisinopril, 20 mg,  Oral, Daily  senna-docusate sodium, 2 tablet, Oral, Daily  sodium chloride, 10 mL, Intravenous, Q12H  tamsulosin, 0.4 mg, Oral, Nightly  torsemide, 60 mg, Oral, Daily  vitamin B-12, 1,000 mcg, Oral, Daily    Infusions  hold, 1 each    Diet  Diet Regular; Cardiac, Consistent Carbohydrate    I personally reviewed his chest x-ray which shows no pneumothorax after thoracentesis       Assessment/Plan     Active Hospital Problems    Diagnosis  POA   • **Acute on chronic combined systolic and diastolic congestive heart failure (HCC) [I50.43]  Yes   • CKD (chronic kidney disease) stage 4, GFR 15-29 ml/min ([facility]) [N18.4]  Yes   • Diarrhea [R19.7]  Yes   • Lower abdominal pain [R10.30]  Yes   • Cardiomyopathy ([facility]) [I42.9]  Yes   • Patent foramen ovale [Q21.1]  Not Applicable   • Pleural effusion, bilateral [J90]  Yes   • Essential hypertension [I10]  Yes   • Anemia, chronic renal failure, stage 3 (moderate) ([facility]) [N18.30, D63.1]  Yes   • Chronically elevated troponin [R77.8]  Yes   • Coronary artery disease involving native coronary artery of native heart without angina pectoris [I25.10]  Yes   • YAW (obstructive sleep apnea) [G47.33]  Yes   • Pleural effusion [J90]  Yes   • Hypertensive urgency [I16.0]  Yes   • Acquired hypothyroidism [E03.9]  Yes   • Type 2 diabetes mellitus with ophthalmic complication ([facility]) [E11.39]  Yes   • Vitamin D deficiency [E55.9]  Yes      Resolved Hospital Problems   No resolved problems to display.       66-year-old male with a history of diabetes, coronary artery disease status post CABG, CKD, chronic systolic heart failure, and CVA in May 2021 who presented with abdominal pain, nausea and vomiting.  He is admitted for acute on chronic combined systolic and diastolic heart failure and bilateral pleural effusions, likely due to medication noncompliance.    Today: Kidney function worsening.  Continue diuresis per nephrology.    Acute on chronic systolic and diastolic heart failure  -Torsemide 60  mg daily.  Continue beta-blocker and ACE inhibitor.  Cardiology following  - nephrology following to assist with diuresis in the setting of CKD     Bilateral pleural effusions  -Status post thoracentesis, 2 L removed on 12/15.  Diuresis as above.       History of stroke, May 2021  -On apixaban     Type 2 diabetes  -Hold basal insulin due to some hypoglycemia, continue sliding scale insulin     CKD stage IV  -Baseline creatinine appears to be 2-2.3.    -Creatinine worsening today, diuresis per nephrology     Hypertensive urgency- resolved  -Resume home amlodipine, Coreg, lisinopril. Lasix as above      · Eliquis (home med) for DVT prophylaxis.   · Full code.  · Discussed with patient and nursing staff.  And family  · Anticipate discharge home when cleared by consultants.      Emily Juan, [facility]  12/16/21  10:42 EST    Patient was placed in face mask on first look.  I wore protective equipment throughout this patient encounter including a face mask, gloves and protective eyewear.  Hand hygiene was performed before donning protective equipment and after removal when leaving the room.

## 2021-12-16 NOTE — PROGRESS NOTES
Enter Query Response Below      Query Response: Acute on chronic combined systolic and diastolic heart failure secondary to cardiomyopathy             If applicable, please update the problem list.     Patient: Carlito Mo        : 1955  Account: [id number]           Admit Date: 2021        Options to Respond to Query:    1. Access the Encounter     a. From the To-Do Side bar, click Respond With Note.     b. Click New Note     c. Answer query within the yellow box.                d. Update the Problem List if applicable.     Dr. Wei:     Patient admitted with acute on chronic combined systolic and diastolic congestive heart failure. Patient has a history of hypertension and cardiomyopathy. Per physician progress note, patient's last echocardiogram was  with an ejection fraction of 31-35%. Patient was treated with IV Lasix, Demadex, and oral Coreg.     After study, can you further clarify the etiology of congestive heart failure as:    Acute on chronic combined systolic and diastolic congestive heart failure due to hypertension   Acute on chronic combined systolic and diastolic congestive heart failure due to cardiomyopathy   Other (please specify)_________________  Clinically indeterminable     By submitting this query, we are merely seeking further clarification of documentation to accurately reflect all conditions that you are monitoring, evaluating, treating or that extend the hospitalization or utilize additional resources of care. Please utilize your independent clinical judgment when addressing the question(s) above.     This query and your response, once completed, will be entered into the legal medical record.    Sincerely,  Rubina ARELLANO RN, CCDS  Clinical Documentation Integrity Program   [email or web address]

## 2021-12-17 ENCOUNTER — HOME HEALTH ADMISSION (OUTPATIENT)
Dept: HOME HEALTH SERVICES | Facility: HOME HEALTHCARE | Age: 66
End: 2021-12-17

## 2021-12-17 LAB
ANION GAP SERPL CALCULATED.3IONS-SCNC: 8.7 MMOL/L (ref 5–15)
BUN SERPL-MCNC: 47 MG/DL (ref 8–23)
BUN/CREAT SERPL: 13.5 (ref 7–25)
CALCIUM SPEC-SCNC: 8.2 MG/DL (ref 8.6–10.5)
CHLORIDE SERPL-SCNC: 108 MMOL/L (ref 98–107)
CO2 SERPL-SCNC: 23.3 MMOL/L (ref 22–29)
CREAT SERPL-MCNC: 3.49 MG/DL (ref 0.76–1.27)
DEPRECATED RDW RBC AUTO: 42.3 FL (ref 37–54)
ERYTHROCYTE [DISTWIDTH] IN BLOOD BY AUTOMATED COUNT: 13.8 % (ref 12.3–15.4)
GFR SERPL CREATININE-BSD FRML MDRD: 18 ML/MIN/1.73
GLUCOSE BLDC GLUCOMTR-MCNC: 100 MG/DL (ref 70–130)
GLUCOSE BLDC GLUCOMTR-MCNC: 112 MG/DL (ref 70–130)
GLUCOSE BLDC GLUCOMTR-MCNC: 163 MG/DL (ref 70–130)
GLUCOSE BLDC GLUCOMTR-MCNC: 298 MG/DL (ref 70–130)
GLUCOSE SERPL-MCNC: 102 MG/DL (ref 65–99)
HCT VFR BLD AUTO: 29.6 % (ref 37.5–51)
HGB BLD-MCNC: 9.4 G/DL (ref 13–17.7)
MCH RBC QN AUTO: 26.4 PG (ref 26.6–33)
MCHC RBC AUTO-ENTMCNC: 31.8 G/DL (ref 31.5–35.7)
MCV RBC AUTO: 83.1 FL (ref 79–97)
PLATELET # BLD AUTO: 229 10*3/MM3 (ref 140–450)
PMV BLD AUTO: 10.9 FL (ref 6–12)
POTASSIUM SERPL-SCNC: 5 MMOL/L (ref 3.5–5.2)
RBC # BLD AUTO: 3.56 10*6/MM3 (ref 4.14–5.8)
SODIUM SERPL-SCNC: 140 MMOL/L (ref 136–145)
WBC NRBC COR # BLD: 5.91 10*3/MM3 (ref 3.4–10.8)

## 2021-12-17 PROCEDURE — 80048 BASIC METABOLIC PNL TOTAL CA: CPT | Performed by: STUDENT IN AN ORGANIZED HEALTH CARE EDUCATION/TRAINING PROGRAM

## 2021-12-17 PROCEDURE — 63710000001 INSULIN LISPRO (HUMAN) PER 5 UNITS: Performed by: NURSE PRACTITIONER

## 2021-12-17 PROCEDURE — 85027 COMPLETE CBC AUTOMATED: CPT | Performed by: STUDENT IN AN ORGANIZED HEALTH CARE EDUCATION/TRAINING PROGRAM

## 2021-12-17 PROCEDURE — 97116 GAIT TRAINING THERAPY: CPT

## 2021-12-17 PROCEDURE — 82962 GLUCOSE BLOOD TEST: CPT

## 2021-12-17 RX ORDER — AMOXICILLIN 250 MG
2 CAPSULE ORAL 2 TIMES DAILY
Status: DISCONTINUED | OUTPATIENT
Start: 2021-12-17 | End: 2021-12-22 | Stop reason: HOSPADM

## 2021-12-17 RX ORDER — POLYETHYLENE GLYCOL 3350 17 G/17G
17 POWDER, FOR SOLUTION ORAL DAILY
Status: DISCONTINUED | OUTPATIENT
Start: 2021-12-17 | End: 2021-12-22 | Stop reason: HOSPADM

## 2021-12-17 RX ADMIN — CARVEDILOL 25 MG: 25 TABLET, FILM COATED ORAL at 17:16

## 2021-12-17 RX ADMIN — AMLODIPINE BESYLATE 5 MG: 5 TABLET ORAL at 08:02

## 2021-12-17 RX ADMIN — FERROUS SULFATE TAB 325 MG (65 MG ELEMENTAL FE) 325 MG: 325 (65 FE) TAB at 17:16

## 2021-12-17 RX ADMIN — INSULIN LISPRO 2 UNITS: 100 INJECTION, SOLUTION INTRAVENOUS; SUBCUTANEOUS at 11:36

## 2021-12-17 RX ADMIN — TAMSULOSIN HYDROCHLORIDE 0.4 MG: 0.4 CAPSULE ORAL at 20:42

## 2021-12-17 RX ADMIN — DOCUSATE SODIUM 50MG AND SENNOSIDES 8.6MG 2 TABLET: 8.6; 5 TABLET, FILM COATED ORAL at 20:42

## 2021-12-17 RX ADMIN — POLYETHYLENE GLYCOL 3350 17 G: 17 POWDER, FOR SOLUTION ORAL at 11:36

## 2021-12-17 RX ADMIN — SODIUM CHLORIDE, PRESERVATIVE FREE 10 ML: 5 INJECTION INTRAVENOUS at 20:42

## 2021-12-17 RX ADMIN — LISINOPRIL 20 MG: 20 TABLET ORAL at 08:02

## 2021-12-17 RX ADMIN — Medication 1000 MCG: at 08:02

## 2021-12-17 RX ADMIN — DOCUSATE SODIUM 50MG AND SENNOSIDES 8.6MG 2 TABLET: 8.6; 5 TABLET, FILM COATED ORAL at 08:02

## 2021-12-17 RX ADMIN — TORSEMIDE 60 MG: 20 TABLET ORAL at 08:02

## 2021-12-17 RX ADMIN — BUPROPION HYDROCHLORIDE 150 MG: 150 TABLET, EXTENDED RELEASE ORAL at 08:02

## 2021-12-17 RX ADMIN — APIXABAN 5 MG: 5 TABLET, FILM COATED ORAL at 08:02

## 2021-12-17 RX ADMIN — LEVOTHYROXINE SODIUM 75 MCG: 0.07 TABLET ORAL at 06:41

## 2021-12-17 RX ADMIN — FOLIC ACID 1000 MCG: 1 TABLET ORAL at 08:02

## 2021-12-17 RX ADMIN — CARVEDILOL 25 MG: 25 TABLET, FILM COATED ORAL at 08:02

## 2021-12-17 RX ADMIN — SODIUM CHLORIDE, PRESERVATIVE FREE 10 ML: 5 INJECTION INTRAVENOUS at 08:02

## 2021-12-17 RX ADMIN — ATORVASTATIN CALCIUM 80 MG: 80 TABLET, FILM COATED ORAL at 20:42

## 2021-12-17 RX ADMIN — APIXABAN 5 MG: 5 TABLET, FILM COATED ORAL at 20:42

## 2021-12-17 RX ADMIN — ASPIRIN 81 MG: 81 TABLET, COATED ORAL at 08:02

## 2021-12-17 RX ADMIN — FERROUS SULFATE TAB 325 MG (65 MG ELEMENTAL FE) 325 MG: 325 (65 FE) TAB at 08:02

## 2021-12-17 RX ADMIN — INSULIN LISPRO 6 UNITS: 100 INJECTION, SOLUTION INTRAVENOUS; SUBCUTANEOUS at 21:12

## 2021-12-17 NOTE — PLAN OF CARE
Goal Outcome Evaluation:              Outcome Summary: VSS no complaints of pain. thoracentesis site soft and intact. Maintain safety and continue to montior.

## 2021-12-17 NOTE — CASE MANAGEMENT/SOCIAL WORK
Continued Stay Note  [facility]     Patient Name: Carlito Mo  MRN: [id number]  Today's Date: 12/17/2021    Admit Date: 12/13/2021     Discharge Plan     Row Name 12/17/21 [id number]       Plan    Plan Home. [facility] referral pending    Patient/Family in Agreement with Plan yes    Plan Comments CCP notified by [facility] that they are unable to accept patient due to staffing. Spoke with patient who requests referral to [facility]. massiel martinez rn/ccp    Row Name 12/17/21 [id number]       Plan    Plan Plans for home. [facility] referral pending    Patient/Family in Agreement with Plan yes    Plan Comments CCP followed up with patient regarding discharge planning. Noted patient had thoracentesis and creatinine remains elevated. Patient is agreeable to home health at discharge for nursing support and requests referral to [facility]. [facility] referral made and [facility] notified. Massiel Martinez RN/CCP               Discharge Codes    No documentation.               Expected Discharge Date and Time     Expected Discharge Date Expected Discharge Time    Dec 16, 2021             Amanda Martinez

## 2021-12-17 NOTE — PLAN OF CARE
Goal Outcome Evaluation:  Plan of Care Reviewed With: patient        Progress: improving  Outcome Summary: VSS. blood sugars treated with sliding scale insulin 1x. pt had BM this shift. standby up in room with cane. pt has good appetite. eliquis given this shift. no complaints this shift.  will CTM, safety maintained.

## 2021-12-17 NOTE — DISCHARGE PLACEMENT REQUEST
"Carlito Mo (66 y.o. Male)             Date of Birth Social Security Number Address Home Phone MRN    1955  [id number] [facility] [address] [phone] [id number]    Yazdanism Marital Status             Anglican        Admission Date Admission Type Admitting Provider Attending Provider Department, Room/Bed    12/13/21 Emergency Emily Juan DO George, Katherine, DO [address], [address]/1    Discharge Date Discharge Disposition Discharge Destination                         Attending Provider: Emily Juan DO    Allergies: Prozac [Fluoxetine Hcl]    Isolation: None   Infection: None   Code Status: CPR   Advance Care Planning Activity    Ht: 182.9 cm (72\")   Wt: 85.7 kg (189 lb)    Admission Cmt: None   Principal Problem: Acute on chronic combined systolic and diastolic congestive heart failure (HCC) [I50.43]                 Active Insurance as of 12/13/2021     Primary Coverage     Payor Plan Insurance Group Employer/Plan Group    MEDICARE MEDICARE A & B      Payor Plan Address Payor Plan Phone Number Payor Plan Fax Number Effective Dates    [address] [phone]  5/1/2020 - None Entered    [place] [id number]       Subscriber Name Subscriber Birth Date Member ID       CARLITO MO 1955 [id number]                 Emergency Contacts      (Rel.) Home Phone Work Phone Mobile Phone    Lissette Mo (Spouse) [phone] -- --            "

## 2021-12-17 NOTE — THERAPY TREATMENT NOTE
Patient Name: Carlito Mo  : 1955    MRN: [id number]                              Today's Date: 2021       Admit Date: 2021    Visit Dx:     ICD-10-CM ICD-9-CM   1. Lower abdominal pain  R10.30 789.09   2. Mild shortness of breath  R06.02 786.05   3. Elevated brain natriuretic peptide (BNP) level  R79.89 790.99   4. Stage 3 chronic kidney disease, unspecified whether stage 3a or 3b CKD (HCC)  N18.30 585.3   5. Pleural effusion  J90 511.9     Patient Active Problem List   Diagnosis   • Major depressive disorder with single episode, in partial remission ([facility])   • Other hyperlipidemia   • Vitamin D deficiency   • Erectile dysfunction   • Chronic fatigue   • Type 2 diabetes mellitus with ophthalmic complication ([facility])   • Skin lesion of chest wall   • Slow transit constipation   • Benign prostatic hyperplasia with nocturia   • History of basal cell carcinoma   • High blood pressure associated with diabetes ([facility])   • Acquired hypothyroidism   • Hypertensive urgency   • Recurrent strokes ([facility])   • Urinary retention   • Pneumonia   • Acute on chronic combined systolic and diastolic congestive heart failure ([facility])   • Acute respiratory failure with hypoxia (HCC)   • Suspected sleep apnea   • Pleural effusion   • YAW (obstructive sleep apnea)   • Coronary artery disease involving native coronary artery of native heart without angina pectoris   • Chronically elevated troponin   • Colon cancer screening   • Enlarged lymph nodes   • Functional gait abnormality   • History of myocardial infarction   • Hospital discharge follow-up   • Insomnia   • Iron deficiency anemia   • Major depression, recurrent (HCC)   • Anemia, chronic renal failure, stage 3 (moderate) (HCC)   • Essential hypertension   • Adverse effect of iron and its compounds, initial encounter   • Acute on chronic renal insufficiency   • Debility   • Falls frequently   • Acute pain of right shoulder   • Acute on chronic anemia   • Heme positive  stool   • Neurogenic orthostatic hypotension (HCC)   • Symptomatic anemia   • History of colon polyps   • Helicobacter pylori gastritis   • Esophagitis   • Sleep related hypoxia   • Embolic stroke (HCC)   • Patent foramen ovale   • Pleural effusion, bilateral   • Cardiomyopathy (HCC)   • Lower abdominal pain   • Diarrhea   • CKD (chronic kidney disease) stage 4, GFR 15-29 ml/min (HCC)     Past Medical History:   Diagnosis Date   • Acquired hypothyroidism    • Acute congestive heart failure (HCC)    • Acute respiratory failure with hypoxia (HCC)    • Acute sinusitis    • SYLVAIN (acute kidney injury) (HCC)     on CKD   • Anemia    • Arthritis    • CAD (coronary artery disease)    • Cancer (HCC)     skin   • Chronic combined systolic and diastolic congestive heart failure (HCC)    • Chronic ischemic heart disease    • CKD (chronic kidney disease)    • COPD (chronic obstructive pulmonary disease) (HCC)    • Depression    • Diabetes mellitus type 2, uncontrolled, without complications    • Disease of thyroid gland    • Fatigue    • Frequent PVCs    • Heart attack (HCC)    • History of coronary artery disease     with remote history of bypass surgery in 2001   • History of transfusion    • Hyperglycemia    • Hyperlipidemia    • Hypertension    • Hypertensive encephalopathy    • Mental status change     Acute   • YAW on CPAP    • Pleural effusion    • Pneumonia    • RBBB (right bundle branch block)    • Screening for prostate cancer 2011   • Stroke (HCC)    • TIA (transient ischemic attack)    • Type 2 diabetes mellitus (HCC)    • Urinary retention    • Vitamin D deficiency      Past Surgical History:   Procedure Laterality Date   • APPENDECTOMY N/A 02/14/2016    Dr. Alexey Dodson   • COLONOSCOPY      over 20 years ago.  no polyps    • COLONOSCOPY N/A 9/18/2018    Procedure: COLONOSCOPY;  Surgeon: Jose Enrique Louie MD;  Location: [facility] ENDOSCOPY;  Service: Gastroenterology   • COLONOSCOPY N/A 9/19/2018    IH, EH,  polyps (TAs w/low grade dysplasia)   • COLONOSCOPY N/A 6/1/2020    Procedure: COLONOSCOPY;  Surgeon: Jose Enrique Louie MD;  Location: [facility] ENDOSCOPY;  Service: Gastroenterology;  Laterality: N/A;  Pre op-Anemia, Melena, History of Polyps  Post op-To Cecum/TI, Polyp, Poor Prep   • CORONARY ARTERY BYPASS GRAFT  11/2001   • ENDOSCOPY N/A 5/29/2020    Procedure: ESOPHAGOGASTRODUODENOSCOPY with biopsies;  Surgeon: Amanda Lovelace MD;  Location: [facility] ENDOSCOPY;  Service: Gastroenterology;  Laterality: N/A;  pre-anemia, dark stools  post-esophagitis, hiatal hernia   • ENDOSCOPY N/A 9/15/2020    Procedure: ESOPHAGOGASTRODUODENOSCOPY WITH BIOPSIES;  Surgeon: Jose Enrique Louie MD;  Location: [facility] ENDOSCOPY;  Service: Gastroenterology;  Laterality: N/A;  pre: history of melena and esophagitis  post: mild esophagitis and gastritis, small hiatal hernia   • HERNIA REPAIR Left 12/05/2019   • TONSILLECTOMY     • VASECTOMY        General Information     Row Name 12/17/21 0919          Physical Therapy Time and Intention    Document Type therapy note (daily note)  -DJ     Mode of Treatment individual therapy; physical therapy  -DJ     Row Name 12/17/21 0919          General Information    Patient Profile Reviewed yes  -DJ     Existing Precautions/Restrictions fall  -DJ     Row Name 12/17/21 0919          Cognition    Orientation Status (Cognition) oriented x 4  -DJ     Row Name 12/17/21 0919          Safety Issues, Functional Mobility    Comment, Safety Issues/Impairments (Mobility) gt belt, nonskid socks  -DJ           User Key  (r) = Recorded By, (t) = Taken By, (c) = Cosigned By    Initials Name Provider Type    DJ Leslie Rodriges, PT Physical Therapist               Mobility     Row Name 12/17/21 0920          Bed Mobility    Bed Mobility supine-sit; sit-supine  -DJ     Supine-Sit [place] (Bed Mobility) modified independence  -DJ     Sit-Supine [place] (Bed Mobility) modified independence  -DJ     Assistive  Device (Bed Mobility) head of bed elevated  -DJ     Comment (Bed Mobility) able to reposition self in bed  -DJ     Row Name 12/17/21 0920          Transfers    Comment (Transfers) sit/stand from EOB  -DJ     Row Name 12/17/21 0920          Bed-Chair Transfer    Bed-Chair [place] (Transfers) not tested  -DJ     Row Name 12/17/21 0920          Sit-Stand Transfer    Sit-Stand [place] (Transfers) standby assist; verbal cues  -DJ     Assistive Device (Sit-Stand Transfers) cane, straight  -DJ     Row Name 12/17/21 0920          Gait/Stairs (Locomotion)    [place] Level (Gait) contact guard; verbal cues  -DJ     Assistive Device (Gait) cane, straight  -DJ     Distance in Feet (Gait) 200'  -DJ     Deviations/Abnormal Patterns (Gait) gait speed decreased; stride length decreased; eli decreased  -DJ     Bilateral Gait Deviations forward flexed posture  -DJ     [place] Level (Stairs) verbal cues; contact guard  -DJ     Assistive Device (Stairs) cane, straight  -DJ     Handrail Location (Stairs) both sides  -DJ     Number of Steps (Stairs) 5  -DJ     Ascending Technique (Stairs) step-to-step  -DJ     Descending Technique (Stairs) step-to-step  -DJ     Comment (Gait/Stairs) Pt amb 200' with cane and CGA on level surfaces - occasionally unsteady but 0 LOB, very slow pace. Pt ascended and descended 5 stairs  with cane and handrail and CGA - again slow and cautious, poor endurance limits further amb distance or stairs.  -DJ           User Key  (r) = Recorded By, (t) = Taken By, (c) = Cosigned By    Initials Name Provider Type    Leslie Patel, PT Physical Therapist               Obj/Interventions     Row Name 12/17/21 0924          Balance    Balance Interventions sitting; standing; sit to stand; supported; weight shifting activity  -DJ     Comment, Balance unsteady, may improve with use of wx vs cane. He has both at home  -DJ           User Key  (r) = Recorded By, (t) = Taken By, (c) = Cosigned By     Initials Name Provider Type    Leslie Patel, PT Physical Therapist               Goals/Plan    No documentation.                Clinical Impression     Row Name 12/17/21 0924          Pain    Additional Documentation Pain Scale: Numbers Pre/Post-Treatment (Group)  -DJ     Row Name 12/17/21 0924          Pain Scale: Numbers Pre/Post-Treatment    Pretreatment Pain Rating 0/10 - no pain  -DJ     Pre/Posttreatment Pain Comment c/c is poor endurance  -DJ     Row Name 12/17/21 0924          Plan of Care Review    Plan of Care Reviewed With patient  -DJ     Progress improving  -DJ     Outcome Summary Pt awake in bed in NAD, pleasant and cooperative. Pt essentially independent [facility] bed mobility today and req SBA for sit/stand from EOB. Pt amb 200' with cane on level surfaces - unsteady and very slow but 0 LOB. Pt negotiated 5 stairs with cane and handrail and CGA. Decreased endurance limits further amb distance or more stairs. Progressing well, amb may be more steady with wx - he has both cane and wx at home.  -     Row Name 12/17/21 0924          Therapy Assessment/Plan (PT)    Criteria for Skilled Interventions Met (PT) skilled treatment is necessary  -     Row Name 12/17/21 0924          Vital Signs    O2 Delivery Pre Treatment room air  -DJ     O2 Delivery Intra Treatment room air  -DJ     O2 Delivery Post Treatment room air  -DJ     Pre Patient Position Supine  -DJ     Intra Patient Position Standing  -DJ     Post Patient Position Supine  -DJ     Row Name 12/17/21 0924          Positioning and Restraints    Pre-Treatment Position in bed  -DJ     Post Treatment Position bed  -DJ     In Bed supine; call light within reach; encouraged to call for assist  no exit alarm on when PT arrived; pt reports he goes to bathroom ad socorro  -DJ           User Key  (r) = Recorded By, (t) = Taken By, (c) = Cosigned By    Initials Name Provider Type    Leslie Patel, PT Physical Therapist               Outcome Measures     Row  Name 12/17/21 0928          How much help from another person do you currently need...    Turning from your back to your side while in flat bed without using bedrails? 4  -DJ     Moving from lying on back to sitting on the side of a flat bed without bedrails? 4  -DJ     Moving to and from a bed to a chair (including a wheelchair)? 3  -DJ     Standing up from a chair using your arms (e.g., wheelchair, bedside chair)? 3  -DJ     Climbing 3-5 steps with a railing? 3  -DJ     To walk in hospital room? 3  -DJ     AM-PAC 6 Clicks Score (PT) 20  -DJ     Row Name 12/17/21 0928          Functional Assessment    Outcome Measure Options AM-PAC 6 Clicks Basic Mobility (PT)  -           User Key  (r) = Recorded By, (t) = Taken By, (c) = Cosigned By    Initials Name Provider Type    Leslie Patel, DENISSE Physical Therapist                             Physical Therapy Education                 Title: PT OT SLP Therapies (In Progress)     Topic: Physical Therapy (In Progress)     Point: Mobility training (Done)     Learning Progress Summary           Patient Acceptance, E, VU by  at 12/17/2021 0929    Acceptance, E, VU by EM at 12/15/2021 1028                   Point: Home exercise program (Not Started)     Learner Progress:  Not documented in this visit.          Point: Body mechanics (Done)     Learning Progress Summary           Patient Acceptance, E, VU by  at 12/17/2021 0929                   Point: Precautions (Done)     Learning Progress Summary           Patient Acceptance, E, VU by  at 12/17/2021 0929                               User Key     Initials Effective Dates Name Provider Type Discipline    EM 06/16/21 -  Mary Jo Hernandez PT Physical Therapist PT     10/25/19 -  Leslie Rodriges PT Physical Therapist PT              PT Recommendation and Plan     Plan of Care Reviewed With: patient  Progress: improving  Outcome Summary: Pt awake in bed in NAD, pleasant and cooperative. Pt essentially independent iw  bed mobility today and req SBA for sit/stand from EOB. Pt amb 200' with cane on level surfaces - unsteady and very slow but 0 LOB. Pt negotiated 5 stairs with cane and handrail and CGA. Decreased endurance limits further amb distance or more stairs. Progressing well, amb may be more steady with wx - he has both cane and wx at home.     Time Calculation:    PT Charges     Row Name 12/17/21 0930             Time Calculation    Start Time 0836  -DJ      Stop Time 0852  -DJ      Time Calculation (min) 16 min  -DJ      PT Non-Billable Time (min) 10 min  -DJ      PT Received On 12/17/21  -DJ      PT - Next Appointment 12/18/21  -DJ            User Key  (r) = Recorded By, (t) = Taken By, (c) = Cosigned By    Initials Name Provider Type    Leslie Patel, PT Physical Therapist              Therapy Charges for Today     Code Description Service Date Service Provider Modifiers Qty    20453649350 HC GAIT TRAINING EA 15 MIN 12/17/2021 Leslie Rodriges, PT GP 1          PT G-Codes  Outcome Measure Options: AM-PAC 6 Clicks Basic Mobility (PT)  AM-PAC 6 Clicks Score (PT): 20  AM-PAC 6 Clicks Score (OT): 19    Leslie Rodriges PT  12/17/2021

## 2021-12-17 NOTE — PLAN OF CARE
Goal Outcome Evaluation:  Plan of Care Reviewed With: patient        Progress: improving  Outcome Summary: Pt awake in bed in NAD, pleasant and cooperative. Pt essentially independent iw bed mobility today and req SBA for sit/stand from EOB. Pt amb 200' with cane on level surfaces - unsteady and very slow but 0 LOB. Pt negotiated 5 stairs with cane and handrail and CGA. Decreased endurance limits further amb distance or more stairs. Progressing well, amb may be more steady with wx - he has both cane and wx at home.Cont PT to address functional deficits and progress as tolerated to prepare for d/c home.   Patient was intermittently wearing a face mask during this therapy encounter. Therapist used appropriate personal protective equipment including eye protection, mask, and gloves.  Mask used was standard procedure mask. Appropriate PPE was worn during the entire therapy session. Hand hygiene was completed before and after therapy session. Patient is not in enhanced droplet precautions.

## 2021-12-17 NOTE — PROGRESS NOTES
Name: Carlito Mo ADMIT: 2021   : 1955  PCP: Florencia Caballero MD    MRN: [id number] LOS: 3 days   AGE/SEX: 66 y.o. male  ROOM: [facility]     Subjective   Subjective   Reports feeling tired.  He walked up and down the stairs with physical therapy, felt short of breath afterwards.  Says breakfast was terrible.    Review of Systems  Denies chest pain, abdominal pain, dysuria, fevers     Objective   Objective   Vital Signs  Temp:  [97.8 °F (36.6 °C)-98.5 °F (36.9 °C)] 98 °F (36.7 °C)  Heart Rate:  [58-67] 62  Resp:  [15-16] 15  BP: (110-130)/(65-77) 130/77  SpO2:  [95 %-98 %] 96 %  on   ;   Device (Oxygen Therapy): room air  Body mass index is 25.63 kg/m².  Physical Exam  Constitutional:       General: He is not in acute distress.     Appearance: He is not diaphoretic.      Comments: Chronically ill-appearing   HENT:      Mouth/Throat:      Mouth: Mucous membranes are moist.   Eyes:      General: No scleral icterus.  Cardiovascular:      Rate and Rhythm: Normal rate and regular rhythm.      Heart sounds: No murmur heard.  No gallop.    Pulmonary:      Effort: Pulmonary effort is normal. No respiratory distress.      Breath sounds: No wheezing or rhonchi.   Abdominal:      General: There is no distension.      Palpations: Abdomen is soft.      Tenderness: There is no abdominal tenderness. There is no guarding.   Musculoskeletal:         General: Swelling (1+ to ankles (improving)) present.   Skin:     General: Skin is warm and dry.   Neurological:      Mental Status: He is alert.         Results Review     I reviewed the patient's new clinical results.  Results from last 7 days   Lab Units 21  0556 21  0625 12/15/21  0410 12/14/21  042   WBC 10*3/mm3 5.91 5.32 5.68 6.52   HEMOGLOBIN g/dL 9.4* 9.6* 9.4* 9.9*   PLATELETS 10*3/mm3 229 240 234 233     Results from last 7 days   Lab Units 21  0556 21  0625 12/15/21  0410 21  0421   SODIUM mmol/L 140 137 142 139   POTASSIUM  mmol/L 5.0 4.4 4.2 3.7   CHLORIDE mmol/L 108* 106 109* 110*   CO2 mmol/L 23.3 23.4 24.1 21.8*   BUN mg/dL 47* 39* 29* 24*   CREATININE mg/dL 3.49* 3.07* 2.65* 2.23*   GLUCOSE mg/dL 102* 62* 73 104*   Estimated Creatinine Clearance: 25.2 mL/min (A) (by C-G formula based on SCr of 3.49 mg/dL (H)).  Results from last 7 days   Lab Units 12/13/21  1614   ALBUMIN g/dL 3.10*   BILIRUBIN mg/dL 0.2   ALK PHOS U/L 86   AST (SGOT) U/L 13   ALT (SGPT) U/L 8     Results from last 7 days   Lab Units 12/17/21  0556 12/16/21  0625 12/15/21  0410 12/14/21  0421 12/13/21  1614 12/13/21  1614   CALCIUM mg/dL 8.2* 8.2* 8.1* 8.2*   < > 9.0   ALBUMIN g/dL  --   --   --   --   --  3.10*   MAGNESIUM mg/dL  --   --  1.8  --   --   --    PHOSPHORUS mg/dL  --   --  4.4  --   --   --     < > = values in this interval not displayed.       COVID19   Date Value Ref Range Status   12/13/2021 Not Detected Not Detected - Ref. Range Final   05/20/2021 Not Detected Not Detected - Ref. Range Final     Hemoglobin A1C   Date/Time Value Ref Range Status   12/15/2021 0410 7.68 (H) 4.80 - 5.60 % Final     Glucose   Date/Time Value Ref Range Status   12/17/2021 0604 100 70 - 130 mg/dL Final     Comment:     Meter: [id number] : [id number] Nba Emily NA   12/16/2021 2131 194 (H) 70 - 130 mg/dL Final     Comment:     Meter: [id number] : [id number] Jeremy Mcleod NA   12/16/2021 1620 164 (H) 70 - 130 mg/dL Final     Comment:     Meter: [id number] : [id number] Deanneángel Yepez NA   12/16/2021 1225 165 (H) 70 - 130 mg/dL Final     Comment:     Meter: [id number] : [id number] Deanne Yepez NA   12/16/2021 0639 72 70 - 130 mg/dL Final     Comment:     Meter: [id number] : [id number] Prakash RAMIREZ   12/16/2021 0615 67 (L) 70 - 130 mg/dL Final     Comment:     Meter: [id number] : [id number] Prakash RAMIREZ   12/15/2021 2024 184 (H) 70 - 130 mg/dL Final     Comment:     Meter: [id number] : [id number] Prakash RAMIREZ       XR Chest 2  View  Narrative: TWO VIEW CHEST X-RAY     HISTORY: Postop thoracentesis.     TECHNIQUE: A total of three x-ray images of the chest are provided and  are correlated with images from a sonographically guided right  thoracentesis of 2 L performed earlier today and chest x-ray 12/13/2021.     FINDINGS: There are sternal wires. There has been significant  improvement in the appearance of the right hemithorax. The lateral view  shows persistent blunting of the costophrenic angle posteriorly but the  right pleural effusion is substantially decreased in size. There is also  a small residual left pleural effusion. Central pulmonary vasculature is  engorged and there is mild interstitial edema. There is no pneumothorax.     Impression: No pneumothorax following right thoracentesis. There is  persistent pulmonary vascular engorgement with mild interstitial edema.     This report was finalized on 12/16/2021 12:50 PM by Dr. Klever Guerrero M.D.       I reviewed the patient's daily medications.  Scheduled Medications  amLODIPine, 5 mg, Oral, Daily  apixaban, 5 mg, Oral, Q12H  aspirin, 81 mg, Oral, Daily  atorvastatin, 80 mg, Oral, Nightly  buPROPion XL, 150 mg, Oral, Daily  carvedilol, 25 mg, Oral, BID With Meals  epoetin lane/lane-epbx, 10,000 Units, Subcutaneous, Weekly  ferrous sulfate, 325 mg, Oral, Daily With Breakfast & Dinner  folic acid, 1,000 mcg, Oral, Daily  insulin lispro, 0-9 Units, Subcutaneous, 4x Daily With Meals & Nightly  levothyroxine, 75 mcg, Oral, Q AM  lisinopril, 20 mg, Oral, Daily  polyethylene glycol, 17 g, Oral, Daily  senna-docusate sodium, 2 tablet, Oral, BID  sodium chloride, 10 mL, Intravenous, Q12H  tamsulosin, 0.4 mg, Oral, Nightly  torsemide, 60 mg, Oral, Daily  vitamin B-12, 1,000 mcg, Oral, Daily    Infusions  hold, 1 each    Diet  Diet Regular; Cardiac, Consistent Carbohydrate    I personally reviewed his chest x-ray which shows no pneumothorax after thoracentesis       Assessment/Plan      Active Hospital Problems    Diagnosis  POA   • **Acute on chronic combined systolic and diastolic congestive heart failure (HCC) [I50.43]  Yes   • CKD (chronic kidney disease) stage 4, GFR 15-29 ml/min ([facility]) [N18.4]  Yes   • Diarrhea [R19.7]  Yes   • Lower abdominal pain [R10.30]  Yes   • Cardiomyopathy ([facility]) [I42.9]  Yes   • Patent foramen ovale [Q21.1]  Not Applicable   • Pleural effusion, bilateral [J90]  Yes   • Essential hypertension [I10]  Yes   • Anemia, chronic renal failure, stage 3 (moderate) ([facility]) [N18.30, D63.1]  Yes   • Chronically elevated troponin [R77.8]  Yes   • Coronary artery disease involving native coronary artery of native heart without angina pectoris [I25.10]  Yes   • YAW (obstructive sleep apnea) [G47.33]  Yes   • Pleural effusion [J90]  Yes   • Hypertensive urgency [I16.0]  Yes   • Acquired hypothyroidism [E03.9]  Yes   • Type 2 diabetes mellitus with ophthalmic complication ([facility]) [E11.39]  Yes   • Vitamin D deficiency [E55.9]  Yes      Resolved Hospital Problems   No resolved problems to display.       66-year-old male with a history of diabetes, coronary artery disease status post CABG, CKD, chronic systolic heart failure, and CVA in May 2021 who presented with abdominal pain, nausea and vomiting.  He is admitted for acute on chronic combined systolic and diastolic heart failure and bilateral pleural effusions, likely due to medication noncompliance.    Today: Kidney function continues to worsen.  Follow-up nephrology recommendations    Acute on chronic systolic and diastolic heart failure  -Torsemide 60 mg daily.  Continue beta-blocker and ACE inhibitor.  Cardiology following  - nephrology following to assist with diuresis in the setting of CKD.  His lower extremity edema is improving daily     Bilateral pleural effusions  -Status post right thoracentesis, 2 L removed on 12/15.  Pulmonology recommends hold off on left sided thoracentesis unless his respiratory status worsens.     -Diuresis as above.       History of stroke, May 2021  -On apixaban     Type 2 diabetes  -Stopped basal insulin due to some hypoglycemia, continue sliding scale insulin     CKD stage IV  -Baseline creatinine appears to be 2-2.3.    -Creatinine worsening again today, diuresis per nephrology     Hypertensive urgency- resolved  -Resume home amlodipine, Coreg, lisinopril, torsemide      · Eliquis (home med) for DVT prophylaxis.   · Full code.  · Discussed with patient and nursing staff.  And family  · Anticipate discharge home when cleared by consultants.      Emily Juan [facility]  12/17/21  10:52 EST    Patient was placed in face mask on first look.  I wore protective equipment throughout this patient encounter including a face mask, gloves and protective eyewear.  Hand hygiene was performed before donning protective equipment and after removal when leaving the room.

## 2021-12-17 NOTE — PROGRESS NOTES
NEPHROLOGY PROGRESS NOTE    PATIENT IDENTIFICATION:   Name:  Carlito Mo      MRN:  [id number]     66 y.o.  male             Reason for visit: SYLVAIN    SUBJECTIVE:     Seen and examined.  Feeling good.  Denies shortness of air        OBJECTIVE:  Vitals:    12/16/21 1934 12/16/21 2347 12/17/21 0422 12/17/21 0731   BP: 113/68 110/68  130/77   BP Location: Right arm Right arm  Right arm   Patient Position: Lying Lying  Lying   Pulse: 67 62     Resp: 16 16  15   Temp: 97.8 °F (36.6 °C) 98.5 °F (36.9 °C)  98 °F (36.7 °C)   TempSrc: Oral Oral  Oral   SpO2: 96% 95%  96%   Weight:   85.7 kg (189 lb)    Height:               Body mass index is 25.63 kg/m².    Intake/Output Summary (Last 24 hours) at 12/17/2021 0938  Last data filed at 12/17/2021 0800  Gross per 24 hour   Intake 838 ml   Output 500 ml   Net 338 ml         Exam:  GEN:  No distress, appears stated age  EYES:   Anicteric sclera  ENT:    External ears/nose normal, MM are moist  NECK:  No adenopathy, JVP none  LUNGS: Normal chest on inspection; not labored  CV:  Normal S1S2, without murmur  ABD:  Non-tender, non-distended, no hepatosplenomegaly, +BS  EXT:  No edema; no cyanosis; clubbing    Scheduled meds:  amLODIPine, 5 mg, Oral, Daily  apixaban, 5 mg, Oral, Q12H  aspirin, 81 mg, Oral, Daily  atorvastatin, 80 mg, Oral, Nightly  buPROPion XL, 150 mg, Oral, Daily  carvedilol, 25 mg, Oral, BID With Meals  epoetin lane/lane-epbx, 10,000 Units, Subcutaneous, Weekly  ferrous sulfate, 325 mg, Oral, Daily With Breakfast & Dinner  folic acid, 1,000 mcg, Oral, Daily  insulin lispro, 0-9 Units, Subcutaneous, 4x Daily With Meals & Nightly  levothyroxine, 75 mcg, Oral, Q AM  lisinopril, 20 mg, Oral, Daily  senna-docusate sodium, 2 tablet, Oral, Daily  sodium chloride, 10 mL, Intravenous, Q12H  tamsulosin, 0.4 mg, Oral, Nightly  torsemide, 60 mg, Oral, Daily  vitamin B-12, 1,000 mcg, Oral, Daily      IV meds:                      hold, 1 each        Data Review:    Results  from last 7 days   Lab Units 12/17/21  0556 12/16/21  0625 12/15/21  0410 12/14/21  0421 12/13/21  1614   SODIUM mmol/L 140 137 142   < > 138   POTASSIUM mmol/L 5.0 4.4 4.2   < > 4.0   CHLORIDE mmol/L 108* 106 109*   < > 107   CO2 mmol/L 23.3 23.4 24.1   < > 23.7   BUN mg/dL 47* 39* 29*   < > 26*   CREATININE mg/dL 3.49* 3.07* 2.65*   < > 2.64*   CALCIUM mg/dL 8.2* 8.2* 8.1*   < > 9.0   BILIRUBIN mg/dL  --   --   --   --  0.2   ALK PHOS U/L  --   --   --   --  86   ALT (SGPT) U/L  --   --   --   --  8   AST (SGOT) U/L  --   --   --   --  13   GLUCOSE mg/dL 102* 62* 73   < > 190*    < > = values in this interval not displayed.       Estimated Creatinine Clearance: 25.2 mL/min (A) (by C-G formula based on SCr of 3.49 mg/dL (H)).    Results from last 7 days   Lab Units 12/15/21  0410   MAGNESIUM mg/dL 1.8   PHOSPHORUS mg/dL 4.4       Results from last 7 days   Lab Units 12/17/21  0556 12/16/21  0625 12/15/21  0410 12/14/21 0421 12/13/21  1614   WBC 10*3/mm3 5.91 5.32 5.68 6.52 6.11   HEMOGLOBIN g/dL 9.4* 9.6* 9.4* 9.9* 11.7*   PLATELETS 10*3/mm3 229 240 234 233 283                   ASSESSMENT:     Acute on chronic combined systolic and diastolic congestive heart failure ([facility])    Vitamin D deficiency    Type 2 diabetes mellitus with ophthalmic complication ([facility])    Acquired hypothyroidism    Hypertensive urgency    Pleural effusion    YAW (obstructive sleep apnea)    Coronary artery disease involving native coronary artery of native heart without angina pectoris    Chronically elevated troponin    Anemia, chronic renal failure, stage 3 (moderate) ([facility])    Essential hypertension    Patent foramen ovale    Pleural effusion, bilateral    Cardiomyopathy ([facility])    Lower abdominal pain    Diarrhea    CKD (chronic kidney disease) stage 4, GFR 15-29 ml/min (HCC)      -Acute kidney injury on chronic kidney disease stage IV: His kidney function has been fluctuating but most recently had been in the range between 2 and 2.3 mg/dL.   Creatinine today is 2.6 mg/dL.  Patient appears to be euvolemic.  He is going to undergo thoracentesis.  The patient is noncompliant and we had lengthy discussion about importance of compliance.     -Proteinuria: Most likely due to diabetic nephropathy.  Awaiting  free serum kappa light chain  -Pleural effusion:  Had thoracentesis yesterday December 15  -Diabetes mellitus type 2: Noncompliant with the treatment  -Hypertension  -Acute on chronic congestive heart failure last ejection fraction 31-35% with fixed restrictive diastolic congestive heart failure  -Anemia of chronic illness: Last hemoglobin 9.6.  Last iron profile appears to be adequate  -Coronary artery disease  -History of stroke  -History of obstructive sleep apnea  -History of noncompliance      PLAN:    Kidney function has worsened further to 3.4 mg/dL.  Plan to hold diuretic  Patient is likely heading toward dialysis in the near future  Continue to monitor kidney function and will hold on discharge that his kidney function starts at the level of  Discussed with the patient importance of compliance  Start iron supplement  Start vitamin D  Surveillance labs    Vitor Parmar MD  12/17/2021    09:38 EST

## 2021-12-17 NOTE — PROGRESS NOTES
Jorge Shukla MD                          [phone]      Patient ID:    Name:  Carlito Mo    MRN:  [id number]    1955   66 y.o.  male            Patient Care Team:  Florencia Caballero MD as PCP - General (Internal Medicine)  Amber Murguia MD as Consulting Physician (Cardiology)  Sera La RPH as Pharmacist  Seth Ladd MD as Surgeon (General Surgery)  Kim Hoyos MD PhD as Consulting Physician (Hematology and Oncology)  Jerome Diallo MD as Referring Physician (Family Medicine)  Jose Enrique Louie MD as Consulting Physician (Gastroenterology)  Nima Huddleston MD as Consulting Physician (Nephrology)    CC/ Reason for visit: Bilateral pleural effusions, congestive heart failure, CKD, medical noncompliance    Subjective: Pt seen and examined this AM. No acute overnight events noted.  Not requiring supplemental oxygen.  Ongoing diuresis with some concern for renal dysfunction and potential need for dialysis.  Working with physical therapy    ROS: Denies any subjective fevers, syncope or presyncopal events, new neurological deficits, nausea or vomiting currently    Objective     Vital Signs past 24hrs    BP range: BP: (109-130)/(66-77) 109/66  Pulse range: Heart Rate:  [62-67] 64  Resp rate range: Resp:  [15-16] 16  Temp range: Temp (24hrs), Av °F (36.7 °C), Min:97.8 °F (36.6 °C), Max:98.5 °F (36.9 °C)      Ventilator/Non-Invasive Ventilation Settings (From admission, onward)             Start     Ordered    21 1356  NIPPV (CPAP or BIPAP)  Until Discontinued        Question Answer Comment   Indication: Sleep Apnea    Type: AutoCPAP    Min Pressure 10        21 1356                Device (Oxygen Therapy): room air       85.7 kg (189 lb); Body mass index is 25.63 kg/m².      Intake/Output Summary (Last 24 hours) at 2021 1556  Last data filed at 2021 1400  Gross per 24 hour   Intake 838 ml   Output 500 ml   Net 338 ml        PHYSICAL EXAM   Constitutional: Middle aged pt in bed, No acute respiratory distress, + accessory muscle use  Head: - NCAT  Eyes: No pallor, Anicteric conjunctiva, EOMI.  ENMT:  Mallampati 3, no oral thrush. Moist MM.   NECK: Trachea midline, No thyromegaly, no palpable cervical LNpathy  Heart: RRR, no murmur. 1+ pedal edema   Lungs: HAYDEN +, decreased breath sound at the bases.  No wheezes/ crackles heard    Abdomen: Soft. No tenderness, guarding or rigidity. No palpable masses  Extremities: Extremities warm and well perfused. No cyanosis/ clubbing  Neuro: Conscious, answers appropriately, no gross focal neuro deficits  Psych: Mood and affect appropriate    PPE recommended per [facility] ([place] infectious disease Isolation protocol for the current clinical scenario(as mentioned below) was followed.     Scheduled meds:  amLODIPine, 5 mg, Oral, Daily  apixaban, 5 mg, Oral, Q12H  aspirin, 81 mg, Oral, Daily  atorvastatin, 80 mg, Oral, Nightly  buPROPion XL, 150 mg, Oral, Daily  carvedilol, 25 mg, Oral, BID With Meals  epoetin lane/lane-epbx, 10,000 Units, Subcutaneous, Weekly  ferrous sulfate, 325 mg, Oral, Daily With Breakfast & Dinner  folic acid, 1,000 mcg, Oral, Daily  insulin lispro, 0-9 Units, Subcutaneous, 4x Daily With Meals & Nightly  levothyroxine, 75 mcg, Oral, Q AM  lisinopril, 20 mg, Oral, Daily  polyethylene glycol, 17 g, Oral, Daily  senna-docusate sodium, 2 tablet, Oral, BID  sodium chloride, 10 mL, Intravenous, Q12H  tamsulosin, 0.4 mg, Oral, Nightly  torsemide, 60 mg, Oral, Daily  vitamin B-12, 1,000 mcg, Oral, Daily        IV meds:                      hold, 1 each        Data Review:      Results from last 7 days   Lab Units 12/17/21  0556 12/16/21  0625 12/15/21  0410 12/14/21  0421 12/13/21  1614   SODIUM mmol/L 140 137 142   < > 138   POTASSIUM mmol/L 5.0 4.4 4.2   < > 4.0   CHLORIDE mmol/L 108* 106 109*   < > 107   CO2 mmol/L 23.3 23.4 24.1   < > 23.7   BUN mg/dL 47* 39* 29*   < > 26*    CREATININE mg/dL 3.49* 3.07* 2.65*   < > 2.64*   CALCIUM mg/dL 8.2* 8.2* 8.1*   < > 9.0   BILIRUBIN mg/dL  --   --   --   --  0.2   ALK PHOS U/L  --   --   --   --  86   ALT (SGPT) U/L  --   --   --   --  8   AST (SGOT) U/L  --   --   --   --  13   GLUCOSE mg/dL 102* 62* 73   < > 190*   WBC 10*3/mm3 5.91 5.32 5.68   < > 6.11   HEMOGLOBIN g/dL 9.4* 9.6* 9.4*   < > 11.7*   PLATELETS 10*3/mm3 229 240 234   < > 283   PROBNP pg/mL  --   --   --   --  7,027.0*    < > = values in this interval not displayed.       Lab Results   Component Value Date    CALCIUM 8.2 (L) 12/17/2021    PHOS 4.4 12/15/2021       Results from last 7 days   Lab Units 12/15/21  1040   BODYFLDCX  No growth at 2 days              Results Review:    I have reviewed the relevant laboratory results and independently reviewed the chest imaging from this hospitalization including the available echocardiogram reports personally and summarized it if/ when appropriate below    Assessment    Bilateral- Rt>Lt pl effusion s/p Rt thora- 2L; 12/15   Acute on chronic systolic heart failure  Medical noncompliance  Previous CVA on Eliquis; noncompliant  CKD  CAD s/p prior set CABG  Previous GI bleed  YAW on CPAP     RECOMMENDATIONS:  Patient stable from a respiratory standpoint and on room air.  Ongoing diuresis per nephrology.    Would hold off on left-sided thoracentesis for now until we have any respiratory issues.  We will follow up with chest x-ray when euvolemic.  Noted nephrology concerns regarding worsening renal function and potential need for dialysis unfortunately.  OOB/ PT    Jorge Shukla MD  12/17/2021

## 2021-12-17 NOTE — CASE MANAGEMENT/SOCIAL WORK
Continued Stay Note  [facility]     Patient Name: Carlito Mo  MRN: [id number]  Today's Date: 12/17/2021    Admit Date: 12/13/2021     Discharge Plan     Row Name 12/17/21 1454       Plan    Plan Plans for home. [facility] referral pending    Patient/Family in Agreement with Plan yes    Plan Comments CCP followed up with patient regarding discharge planning. Noted patient had thoracentesis and creatinine remains elevated. Patient is agreeable to home health at discharge for nursing support and requests referral to [facility]. [facility] referral made and [facility] notified. Massiel Deluca, RN/CCP               Discharge Codes    No documentation.               Expected Discharge Date and Time     Expected Discharge Date Expected Discharge Time    Dec 16, 2021             Amanda Deluca

## 2021-12-18 LAB
ANION GAP SERPL CALCULATED.3IONS-SCNC: 9.5 MMOL/L (ref 5–15)
BUN SERPL-MCNC: 57 MG/DL (ref 8–23)
BUN/CREAT SERPL: 15.7 (ref 7–25)
CALCIUM SPEC-SCNC: 8.4 MG/DL (ref 8.6–10.5)
CHLORIDE SERPL-SCNC: 105 MMOL/L (ref 98–107)
CO2 SERPL-SCNC: 23.5 MMOL/L (ref 22–29)
CREAT SERPL-MCNC: 3.62 MG/DL (ref 0.76–1.27)
DEPRECATED RDW RBC AUTO: 39.6 FL (ref 37–54)
ERYTHROCYTE [DISTWIDTH] IN BLOOD BY AUTOMATED COUNT: 13.4 % (ref 12.3–15.4)
GFR SERPL CREATININE-BSD FRML MDRD: 17 ML/MIN/1.73
GLUCOSE BLDC GLUCOMTR-MCNC: 134 MG/DL (ref 70–130)
GLUCOSE BLDC GLUCOMTR-MCNC: 175 MG/DL (ref 70–130)
GLUCOSE BLDC GLUCOMTR-MCNC: 213 MG/DL (ref 70–130)
GLUCOSE BLDC GLUCOMTR-MCNC: 88 MG/DL (ref 70–130)
GLUCOSE SERPL-MCNC: 81 MG/DL (ref 65–99)
HCT VFR BLD AUTO: 29.2 % (ref 37.5–51)
HGB BLD-MCNC: 9.4 G/DL (ref 13–17.7)
MCH RBC QN AUTO: 26.1 PG (ref 26.6–33)
MCHC RBC AUTO-ENTMCNC: 32.2 G/DL (ref 31.5–35.7)
MCV RBC AUTO: 81.1 FL (ref 79–97)
PLATELET # BLD AUTO: 222 10*3/MM3 (ref 140–450)
PMV BLD AUTO: 11.1 FL (ref 6–12)
POTASSIUM SERPL-SCNC: 4.5 MMOL/L (ref 3.5–5.2)
RBC # BLD AUTO: 3.6 10*6/MM3 (ref 4.14–5.8)
SODIUM SERPL-SCNC: 138 MMOL/L (ref 136–145)
WBC NRBC COR # BLD: 6.48 10*3/MM3 (ref 3.4–10.8)

## 2021-12-18 PROCEDURE — 97530 THERAPEUTIC ACTIVITIES: CPT

## 2021-12-18 PROCEDURE — 85027 COMPLETE CBC AUTOMATED: CPT | Performed by: STUDENT IN AN ORGANIZED HEALTH CARE EDUCATION/TRAINING PROGRAM

## 2021-12-18 PROCEDURE — 82962 GLUCOSE BLOOD TEST: CPT

## 2021-12-18 PROCEDURE — 63710000001 INSULIN LISPRO (HUMAN) PER 5 UNITS: Performed by: NURSE PRACTITIONER

## 2021-12-18 PROCEDURE — 80048 BASIC METABOLIC PNL TOTAL CA: CPT | Performed by: STUDENT IN AN ORGANIZED HEALTH CARE EDUCATION/TRAINING PROGRAM

## 2021-12-18 RX ORDER — ERGOCALCIFEROL 1.25 MG/1
50000 CAPSULE ORAL
Status: DISCONTINUED | OUTPATIENT
Start: 2021-12-18 | End: 2021-12-22 | Stop reason: HOSPADM

## 2021-12-18 RX ADMIN — APIXABAN 2.5 MG: 5 TABLET, FILM COATED ORAL at 08:18

## 2021-12-18 RX ADMIN — INSULIN LISPRO 2 UNITS: 100 INJECTION, SOLUTION INTRAVENOUS; SUBCUTANEOUS at 17:05

## 2021-12-18 RX ADMIN — Medication 1000 MCG: at 08:18

## 2021-12-18 RX ADMIN — FERROUS SULFATE TAB 325 MG (65 MG ELEMENTAL FE) 325 MG: 325 (65 FE) TAB at 17:05

## 2021-12-18 RX ADMIN — CARVEDILOL 25 MG: 25 TABLET, FILM COATED ORAL at 17:05

## 2021-12-18 RX ADMIN — ASPIRIN 81 MG: 81 TABLET, COATED ORAL at 08:18

## 2021-12-18 RX ADMIN — APIXABAN 2.5 MG: 5 TABLET, FILM COATED ORAL at 21:00

## 2021-12-18 RX ADMIN — AMLODIPINE BESYLATE 5 MG: 5 TABLET ORAL at 08:17

## 2021-12-18 RX ADMIN — ERGOCALCIFEROL 50000 UNITS: 1.25 CAPSULE ORAL at 08:18

## 2021-12-18 RX ADMIN — BUPROPION HYDROCHLORIDE 150 MG: 150 TABLET, EXTENDED RELEASE ORAL at 08:18

## 2021-12-18 RX ADMIN — LEVOTHYROXINE SODIUM 75 MCG: 0.07 TABLET ORAL at 06:42

## 2021-12-18 RX ADMIN — TAMSULOSIN HYDROCHLORIDE 0.4 MG: 0.4 CAPSULE ORAL at 21:00

## 2021-12-18 RX ADMIN — POLYETHYLENE GLYCOL 3350 17 G: 17 POWDER, FOR SOLUTION ORAL at 08:18

## 2021-12-18 RX ADMIN — FERROUS SULFATE TAB 325 MG (65 MG ELEMENTAL FE) 325 MG: 325 (65 FE) TAB at 08:17

## 2021-12-18 RX ADMIN — FOLIC ACID 1000 MCG: 1 TABLET ORAL at 08:18

## 2021-12-18 RX ADMIN — SODIUM CHLORIDE, PRESERVATIVE FREE 10 ML: 5 INJECTION INTRAVENOUS at 21:01

## 2021-12-18 RX ADMIN — SODIUM CHLORIDE, PRESERVATIVE FREE 10 ML: 5 INJECTION INTRAVENOUS at 08:18

## 2021-12-18 RX ADMIN — INSULIN LISPRO 4 UNITS: 100 INJECTION, SOLUTION INTRAVENOUS; SUBCUTANEOUS at 21:05

## 2021-12-18 RX ADMIN — DOCUSATE SODIUM 50MG AND SENNOSIDES 8.6MG 2 TABLET: 8.6; 5 TABLET, FILM COATED ORAL at 08:18

## 2021-12-18 RX ADMIN — CARVEDILOL 25 MG: 25 TABLET, FILM COATED ORAL at 08:17

## 2021-12-18 RX ADMIN — ATORVASTATIN CALCIUM 80 MG: 80 TABLET, FILM COATED ORAL at 21:01

## 2021-12-18 NOTE — PROGRESS NOTES
Name: Carlito Mo ADMIT: 2021   : 1955  PCP: Florencia Caballero MD    MRN: [id number] LOS: 4 days   AGE/SEX: 66 y.o. male  ROOM: [facility]/     Subjective   Subjective   Patient is resting in bed, asleep on my arrival.  Awakens easily to verbal stimulus.  His creatinine has been trending up and he was reportedly told by nephrology that he might need dialysis if his kidney function does not improve.  His diuretics have been held.    Review of Systems  Denies chest pain, abdominal pain, dysuria, fevers     Objective   Objective   Vital Signs  Temp:  [97.4 °F (36.3 °C)-98 °F (36.7 °C)] 97.4 °F (36.3 °C)  Heart Rate:  [58-63] 63  Resp:  [15-17] 17  BP: ()/(57-77) 99/57  SpO2:  [92 %-97 %] 95 %  on   ;   Device (Oxygen Therapy): room air  Body mass index is 25.44 kg/m².  Physical Exam  Constitutional:       General: He is not in acute distress.     Appearance: He is not diaphoretic.      Comments: Chronically ill-appearing   HENT:      Mouth/Throat:      Mouth: Mucous membranes are moist.   Eyes:      General: No scleral icterus.  Cardiovascular:      Rate and Rhythm: Normal rate and regular rhythm.      Heart sounds: No murmur heard.  No gallop.    Pulmonary:      Effort: Pulmonary effort is normal. No respiratory distress.      Breath sounds: No wheezing or rhonchi.   Abdominal:      General: There is no distension.      Palpations: Abdomen is soft.      Tenderness: There is no abdominal tenderness. There is no guarding.   Musculoskeletal:         General: Swelling (1+ to ankles (improving)) present.   Skin:     General: Skin is warm and dry.   Neurological:      Mental Status: He is alert.         Results Review     I reviewed the patient's new clinical results.  Results from last 7 days   Lab Units 21  0502 21  0556 21  0625 12/15/21  0410   WBC 10*3/mm3 6.48 5.91 5.32 5.68   HEMOGLOBIN g/dL 9.4* 9.4* 9.6* 9.4*   PLATELETS 10*3/mm3 222 229 240 234     Results from last 7 days    Lab Units 12/18/21  0502 12/17/21  0556 12/16/21  0625 12/15/21  0410   SODIUM mmol/L 138 140 137 142   POTASSIUM mmol/L 4.5 5.0 4.4 4.2   CHLORIDE mmol/L 105 108* 106 109*   CO2 mmol/L 23.5 23.3 23.4 24.1   BUN mg/dL 57* 47* 39* 29*   CREATININE mg/dL 3.62* 3.49* 3.07* 2.65*   GLUCOSE mg/dL 81 102* 62* 73   Estimated Creatinine Clearance: 24.2 mL/min (A) (by C-G formula based on SCr of 3.62 mg/dL (H)).  Results from last 7 days   Lab Units 12/13/21  1614   ALBUMIN g/dL 3.10*   BILIRUBIN mg/dL 0.2   ALK PHOS U/L 86   AST (SGOT) U/L 13   ALT (SGPT) U/L 8     Results from last 7 days   Lab Units 12/18/21  0502 12/17/21  0556 12/16/21  0625 12/15/21  0410 12/14/21  0421 12/13/21  1614   CALCIUM mg/dL 8.4* 8.2* 8.2* 8.1*   < > 9.0   ALBUMIN g/dL  --   --   --   --   --  3.10*   MAGNESIUM mg/dL  --   --   --  1.8  --   --    PHOSPHORUS mg/dL  --   --   --  4.4  --   --     < > = values in this interval not displayed.       COVID19   Date Value Ref Range Status   12/13/2021 Not Detected Not Detected - Ref. Range Final   05/20/2021 Not Detected Not Detected - Ref. Range Final     Glucose   Date/Time Value Ref Range Status   12/18/2021 1120 134 (H) 70 - 130 mg/dL Final     Comment:     Meter: [id number] : [id number] Xavier Hayley    12/18/2021 0641 88 70 - 130 mg/dL Final     Comment:     Meter: [id number] : [id number] [email or web address]    12/17/2021 2045 298 (H) 70 - 130 mg/dL Final     Comment:     Meter: [id number] : [id number] Sonny Salgado NA   12/17/2021 1621 112 70 - 130 mg/dL Final     Comment:     Meter: [id number] : [id number] Xavier Hayley JAMES   12/17/2021 1121 163 (H) 70 - 130 mg/dL Final     Comment:     Meter: [id number] : [id number] Xavier Hayley JAMES   12/17/2021 0604 100 70 - 130 mg/dL Final     Comment:     Meter: [id number] : [id number] Nba Diaz    12/16/2021 2131 194 (H) 70 - 130 mg/dL Final     Comment:     Meter: [id number] : [id number] Jeremy RAMIREZ       XR  Chest 2 View  Narrative: TWO VIEW CHEST X-RAY     HISTORY: Postop thoracentesis.     TECHNIQUE: A total of three x-ray images of the chest are provided and  are correlated with images from a sonographically guided right  thoracentesis of 2 L performed earlier today and chest x-ray 12/13/2021.     FINDINGS: There are sternal wires. There has been significant  improvement in the appearance of the right hemithorax. The lateral view  shows persistent blunting of the costophrenic angle posteriorly but the  right pleural effusion is substantially decreased in size. There is also  a small residual left pleural effusion. Central pulmonary vasculature is  engorged and there is mild interstitial edema. There is no pneumothorax.     Impression: No pneumothorax following right thoracentesis. There is  persistent pulmonary vascular engorgement with mild interstitial edema.     This report was finalized on 12/16/2021 12:50 PM by Dr. Klever Guerrero M.D.       I reviewed the patient's daily medications.  Scheduled Medications  amLODIPine, 5 mg, Oral, Daily  apixaban, 2.5 mg, Oral, Q12H  aspirin, 81 mg, Oral, Daily  atorvastatin, 80 mg, Oral, Nightly  buPROPion XL, 150 mg, Oral, Daily  carvedilol, 25 mg, Oral, BID With Meals  epoetin lane/lane-epbx, 10,000 Units, Subcutaneous, Weekly  ferrous sulfate, 325 mg, Oral, Daily With Breakfast & Dinner  folic acid, 1,000 mcg, Oral, Daily  insulin lispro, 0-9 Units, Subcutaneous, 4x Daily With Meals & Nightly  levothyroxine, 75 mcg, Oral, Q AM  polyethylene glycol, 17 g, Oral, Daily  senna-docusate sodium, 2 tablet, Oral, BID  sodium chloride, 10 mL, Intravenous, Q12H  tamsulosin, 0.4 mg, Oral, Nightly  vitamin B-12, 1,000 mcg, Oral, Daily  vitamin D, 50,000 Units, Oral, Q7 Days    Infusions  hold, 1 each    Diet  Diet Regular; Cardiac, Consistent Carbohydrate       Assessment/Plan     Active Hospital Problems    Diagnosis  POA   • **Acute on chronic combined systolic and diastolic  congestive heart failure (HCC) [I50.43]  Yes   • CKD (chronic kidney disease) stage 4, GFR 15-29 ml/min (HCC) [N18.4]  Yes   • Diarrhea [R19.7]  Yes   • Lower abdominal pain [R10.30]  Yes   • Cardiomyopathy (HCC) [I42.9]  Yes   • Patent foramen ovale [Q21.1]  Not Applicable   • Pleural effusion, bilateral [J90]  Yes   • Essential hypertension [I10]  Yes   • Anemia, chronic renal failure, stage 3 (moderate) (HCC) [N18.30, D63.1]  Yes   • Chronically elevated troponin [R77.8]  Yes   • Coronary artery disease involving native coronary artery of native heart without angina pectoris [I25.10]  Yes   • YAW (obstructive sleep apnea) [G47.33]  Yes   • Pleural effusion [J90]  Yes   • Hypertensive urgency [I16.0]  Yes   • Acquired hypothyroidism [E03.9]  Yes   • 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) [E11.39]  Yes   • Vitamin D deficiency [E55.9]  Yes      Resolved Hospital Problems   No resolved problems to display.       66-year-old male with a history of diabetes, coronary artery disease status post CABG, CKD, chronic systolic heart failure, and CVA in May 2021 who presented with abdominal pain, nausea and vomiting.  He is admitted for acute on chronic combined systolic and diastolic heart failure and bilateral pleural effusions, likely due to medication noncompliance.    Acute on chronic systolic and diastolic heart failure  - Torsemide 60 mg daily- held.  Continue beta-blocker and ACE inhibitor.  Cardiology following  - nephrology following to assist with diuresis in the setting of CKD.  His lower extremity edema is improving daily  -Creatinine continues to worsen, lisinopril and torsemide has been held by nephrology, hopefully kidney function stabilizes/improves in the next 48 to 72 hours however this patient may need dialysis     Bilateral pleural effusions  -Status post right thoracentesis, 2 L removed on 12/15.  Pulmonology recommends hold off on left sided thoracentesis unless his respiratory  status worsens.    -Diuresis-held     History of stroke, May 2021  -On apixaban     Type 2 diabetes  -Stopped basal insulin due to some hypoglycemia, continue sliding scale insulin     CKD stage IV  -Baseline creatinine appears to be 2-2.3.    -Creatinine worsening again today, diuresis held by nephrology     Hypertensive urgency- resolved  -Resume home amlodipine, Coreg,       · Eliquis (home med) for DVT prophylaxis.   · Full code.  · Discussed with patient and nursing staff.  And family  · Anticipate discharge home when cleared by consultants.      Odilia Ayala MD  [facility]  12/18/21  14:16 EST    Patient was placed in face mask on first look.  I wore protective equipment throughout this patient encounter including a face mask, gloves and protective eyewear.  Hand hygiene was performed protective equipment and after removal when leaving the room.

## 2021-12-18 NOTE — PROGRESS NOTES
PROGRESS NOTE  Patient Name: Carlito Mo  Age/Sex: 66 y.o. male  : 1955  MRN: [id number]    Date of Admission: 2021  Date of Encounter Visit: 21   LOS: 4 days   Patient Care Team:  Florencia Caballero MD as PCP - General (Internal Medicine)  Amber Murguia MD as Consulting Physician (Cardiology)  Sera La RPH as Pharmacist  OSeth Conte MD as Surgeon (General Surgery)  Kim Hoyos MD PhD as Consulting Physician (Hematology and Oncology)  Jerome Diallo MD as Referring Physician (Family Medicine)  Jose Enrique Louie MD as Consulting Physician (Gastroenterology)  Nima Huddleston MD as Consulting Physician (Nephrology)    Chief Complaint:pleural effusion    Hospital course: had 2 liters drained from the right chest, left thoracentesis on hold. Currently on RA, diuretics per renal, currently on hold because of the worsening renal failure. No complaints at rest, positive DE LEON.       REVIEW OF SYSTEMS:   CONSTITUTIONAL: no fever or chills  CARDIOVASCULAR: No chest pain, chest pressure or chest discomfort. No palpitations positive edema    RESPIRATORY:  DE LEON. no cough or sputum.   GASTROINTESTINAL: No anorexia, nausea, vomiting or diarrhea. No abdominal pain or blood.   HEMATOLOGIC: No bleeding or bruising.     Ventilator/Non-Invasive Ventilation Settings (From admission, onward)             Start     Ordered    21 1356  NIPPV (CPAP or BIPAP)  Until Discontinued,   Status:  Canceled        Question Answer Comment   Indication: Sleep Apnea    Type: AutoCPAP    Min Pressure 10        21 1356                  Vital Signs  Temp:  [97.4 °F (36.3 °C)-98 °F (36.7 °C)] 97.4 °F (36.3 °C)  Heart Rate:  [58-63] 63  Resp:  [15-17] 17  BP: ()/(57-77) 99/57  SpO2:  [92 %-97 %] 95 %  on    Device (Oxygen Therapy): room air    Intake/Output Summary (Last 24 hours) at 2021 1449  Last data filed at 2021 1341  Gross per 24 hour   Intake 240 ml   Output 450 ml  "  Net -210 ml     Flowsheet Rows      First Filed Value   Admission Height 182.9 cm (72\") Documented at 12/13/2021 2116   Admission Weight 86.2 kg (190 lb) Documented at 12/13/2021 2116        Body mass index is 25.44 kg/m².      12/16/21  0348 12/17/21  0422 12/18/21  0522   Weight: 84.6 kg (186 lb 8 oz) 85.7 kg (189 lb) 85.1 kg (187 lb 9.6 oz)       Physical Exam:  GEN:  No acute distress, alert, cooperative, well developed   EYES:   Sclerae clear. No icterus. PERRL. Normal EOM  ENT:   External ears/nose normal, no oral lesions, no thrush, mucous membranes moist  NECK:  Supple, midline trachea, no JVD  LUNGS: Normal chest on inspection,decreased BS bilatreally remi on the left .   CV:  Regular rhythm and rate. Normal S1/S2. No murmurs, gallops, or rubs noted.  ABD:  Soft, nontender and nondistended. Normal bowel sounds. No guarding  EXT:  Moves all extremities well. No cyanosis. No redness. 2 + edema.   Skin: Dry, intact, no bleeding    Results Review:    Results From Last 14 Days   Lab Units 12/16/21  0625 12/15/21  0410   FERRITIN ng/mL 237.00  --    LDH U/L  --  166     Results from last 7 days   Lab Units 12/18/21  0502 12/17/21  0556 12/16/21  0625 12/15/21  0410 12/14/21  0421 12/13/21  1614   SODIUM mmol/L 138 140 137 142 139 138   POTASSIUM mmol/L 4.5 5.0 4.4 4.2 3.7 4.0   CHLORIDE mmol/L 105 108* 106 109* 110* 107   CO2 mmol/L 23.5 23.3 23.4 24.1 21.8* 23.7   BUN mg/dL 57* 47* 39* 29* 24* 26*   CREATININE mg/dL 3.62* 3.49* 3.07* 2.65* 2.23* 2.64*   CALCIUM mg/dL 8.4* 8.2* 8.2* 8.1* 8.2* 9.0   AST (SGOT) U/L  --   --   --   --   --  13   ALT (SGPT) U/L  --   --   --   --   --  8   ANION GAP mmol/L 9.5 8.7 7.6 8.9 7.2 7.3   ALBUMIN g/dL  --   --   --   --   --  3.10*         Results from last 7 days   Lab Units 12/14/21  0421   TSH uIU/mL 4.300*     Results from last 7 days   Lab Units 12/13/21  1614   PROBNP pg/mL 7,027.0*     Results from last 7 days   Lab Units 12/18/21  0502 12/17/21  0556 12/16/21  0625 " 12/15/21  0410 12/14/21  0421 12/13/21  1614   WBC 10*3/mm3 6.48 5.91 5.32 5.68 6.52 6.11   HEMOGLOBIN g/dL 9.4* 9.4* 9.6* 9.4* 9.9* 11.7*   HEMATOCRIT % 29.2* 29.6* 31.1* 29.5* 30.2* 37.0*   PLATELETS 10*3/mm3 222 229 240 234 233 283   MCV fL 81.1 83.1 84.7 82.9 81.6 84.3   NEUTROPHIL % %  --   --   --   --  63.9 72.5   LYMPHOCYTE % %  --   --   --   --  22.1 15.9*   MONOCYTES % %  --   --   --   --  9.5 7.2   EOSINOPHIL % %  --   --   --   --  4.1 3.8   BASOPHIL % %  --   --   --   --  0.2 0.3   IMM GRAN % %  --   --   --   --  0.2 0.3         Results from last 7 days   Lab Units 12/15/21  0410   MAGNESIUM mg/dL 1.8           Invalid input(s): LDLCALC      Results from last 7 days   Lab Units 12/15/21  0410   HEMOGLOBIN A1C % 7.68*     Glucose   Date/Time Value Ref Range Status   12/18/2021 1120 134 (H) 70 - 130 mg/dL Final     Comment:     M[facility]: [id number] : [id number] [facility]   12/18/2021 0641 88 70 - 130 mg/dL Final     Comment:     Meter: [id number] : [id number] Sonny Naomi    12/17/2021 2045 298 (H) 70 - 130 mg/dL Final     Comment:     Meter: [id number] : [id number] Sonny Naomi    12/17/2021 1621 112 70 - 130 mg/dL Final     Comment:     Meter: [id number] : [id number] [facility]   12/17/2021 1121 163 (H) 70 - 130 mg/dL Final     Comment:     Meter: [id number] : [id number] [facility]   12/17/2021 0604 100 70 - 130 mg/dL Final     Comment:     Meter: [id number] : [id number] Nba Diaz NA   12/16/2021 2131 194 (H) 70 - 130 mg/dL Final     Comment:     Meter: [id number] : [id number] Jeremy Mcleod NA   12/16/2021 1620 164 (H) 70 - 130 mg/dL Final     Comment:     Meter: [id number] : [id number] Deanne RAMIREZ         Results from last 7 days   Lab Units 12/15/21  1040   BODYFLDCX  No growth at 3 days     Results from last 7 days   Lab Units 12/13/21  1628   NITRITE UA  Negative   WBC UA /HPF 3-5*   BACTERIA UA /HPF None Seen   SQUAM EPITHEL UA  /HPF None Seen     Results from last 7 days   Lab Units 12/13/21  2304   COVID19  Not Detected     Results from last 7 days   Lab Units 12/15/21  1716   CREATININE UR mg/dL 69.7   PROTEIN TOTAL URINE mg/dL 369.0           Imaging:   Imaging Results (All)     Procedure Component Value Units Date/Time     I reviewed the patient's new clinical results.  I personally viewed and interpreted the patient's imaging results:        Medication Review:   amLODIPine, 5 mg, Oral, Daily  apixaban, 2.5 mg, Oral, Q12H  aspirin, 81 mg, Oral, Daily  atorvastatin, 80 mg, Oral, Nightly  buPROPion XL, 150 mg, Oral, Daily  carvedilol, 25 mg, Oral, BID With Meals  epoetin lane/lane-epbx, 10,000 Units, Subcutaneous, Weekly  ferrous sulfate, 325 mg, Oral, Daily With Breakfast & Dinner  folic acid, 1,000 mcg, Oral, Daily  insulin lispro, 0-9 Units, Subcutaneous, 4x Daily With Meals & Nightly  levothyroxine, 75 mcg, Oral, Q AM  polyethylene glycol, 17 g, Oral, Daily  senna-docusate sodium, 2 tablet, Oral, BID  sodium chloride, 10 mL, Intravenous, Q12H  tamsulosin, 0.4 mg, Oral, Nightly  vitamin B-12, 1,000 mcg, Oral, Daily  vitamin D, 50,000 Units, Oral, Q7 Days        hold, 1 each        ASSESSMENT:   1. Bilateral pleural effusion right more than left status post thoracentesis 2 L on 12/15/2021  2. Acute on chronic systolic heart failure  3. Medical noncompliance  4. Previous CVA on Eliquis; noncompliant  5. CKD  6. CAD s/p prior set CABG  7. Previous GI bleed  8. YAW on CPAP    PLAN:  Continue to hold on the left-sided thoracentesis as long as the patient remained stable   Diuretics per renal, complicated by renal failure despite the residual volume overload   Discussed with patient  Will follow   Disposition: per primary team  Ivelisse Nicholson MD  12/18/21  14:49 EST         Dictated utilizing Dragon dictation

## 2021-12-18 NOTE — THERAPY TREATMENT NOTE
Patient Name: Carlito Mo  : 1955    MRN: [id number]                              Today's Date: 2021       Admit Date: 2021    Visit Dx:     ICD-10-CM ICD-9-CM   1. Lower abdominal pain  R10.30 789.09   2. Mild shortness of breath  R06.02 786.05   3. Elevated brain natriuretic peptide (BNP) level  R79.89 790.99   4. Stage 3 chronic kidney disease, unspecified whether stage 3a or 3b CKD (HCC)  N18.30 585.3   5. Pleural effusion  J90 511.9     Patient Active Problem List   Diagnosis   • Major depressive disorder with single episode, in partial remission (HCC)   • Other hyperlipidemia   • Vitamin D deficiency   • Erectile dysfunction   • Chronic fatigue   • Type 2 diabetes mellitus with ophthalmic complication ([facility])   • Skin lesion of chest wall   • Slow transit constipation   • Benign prostatic hyperplasia with nocturia   • History of basal cell carcinoma   • High blood pressure associated with diabetes ([facility])   • Acquired hypothyroidism   • Hypertensive urgency   • Recurrent strokes ([facility])   • Urinary retention   • Pneumonia   • Acute on chronic combined systolic and diastolic congestive heart failure ([facility])   • Acute respiratory failure with hypoxia (HCC)   • Suspected sleep apnea   • Pleural effusion   • YAW (obstructive sleep apnea)   • Coronary artery disease involving native coronary artery of native heart without angina pectoris   • Chronically elevated troponin   • Colon cancer screening   • Enlarged lymph nodes   • Functional gait abnormality   • History of myocardial infarction   • Hospital discharge follow-up   • Insomnia   • Iron deficiency anemia   • Major depression, recurrent (HCC)   • Anemia, chronic renal failure, stage 3 (moderate) (HCC)   • Essential hypertension   • Adverse effect of iron and its compounds, initial encounter   • Acute on chronic renal insufficiency   • Debility   • Falls frequently   • Acute pain of right shoulder   • Acute on chronic anemia   • Heme positive  stool   • Neurogenic orthostatic hypotension (HCC)   • Symptomatic anemia   • History of colon polyps   • Helicobacter pylori gastritis   • Esophagitis   • Sleep related hypoxia   • Embolic stroke (HCC)   • Patent foramen ovale   • Pleural effusion, bilateral   • Cardiomyopathy (HCC)   • Lower abdominal pain   • Diarrhea   • CKD (chronic kidney disease) stage 4, GFR 15-29 ml/min (HCC)     Past Medical History:   Diagnosis Date   • Acquired hypothyroidism    • Acute congestive heart failure (HCC)    • Acute respiratory failure with hypoxia (HCC)    • Acute sinusitis    • SYLVAIN (acute kidney injury) (HCC)     on CKD   • Anemia    • Arthritis    • CAD (coronary artery disease)    • Cancer (HCC)     skin   • Chronic combined systolic and diastolic congestive heart failure (HCC)    • Chronic ischemic heart disease    • CKD (chronic kidney disease)    • COPD (chronic obstructive pulmonary disease) (HCC)    • Depression    • Diabetes mellitus type 2, uncontrolled, without complications    • Disease of thyroid gland    • Fatigue    • Frequent PVCs    • Heart attack (HCC)    • History of coronary artery disease     with remote history of bypass surgery in 2001   • History of transfusion    • Hyperglycemia    • Hyperlipidemia    • Hypertension    • Hypertensive encephalopathy    • Mental status change     Acute   • YAW on CPAP    • Pleural effusion    • Pneumonia    • RBBB (right bundle branch block)    • Screening for prostate cancer 2011   • Stroke (HCC)    • TIA (transient ischemic attack)    • Type 2 diabetes mellitus (HCC)    • Urinary retention    • Vitamin D deficiency      Past Surgical History:   Procedure Laterality Date   • APPENDECTOMY N/A 02/14/2016    Dr. Alexey Dodson   • COLONOSCOPY      over 20 years ago.  no polyps    • COLONOSCOPY N/A 9/18/2018    Procedure: COLONOSCOPY;  Surgeon: Jose Enrique Louie MD;  Location: [facility] ENDOSCOPY;  Service: Gastroenterology   • COLONOSCOPY N/A 9/19/2018    IH, EH,  polyps (TAs w/low grade dysplasia)   • COLONOSCOPY N/A 6/1/2020    Procedure: COLONOSCOPY;  Surgeon: Jose Enrique Louie MD;  Location: [facility] ENDOSCOPY;  Service: Gastroenterology;  Laterality: N/A;  Pre op-Anemia, Melena, History of Polyps  Post op-To Cecum/TI, Polyp, Poor Prep   • CORONARY ARTERY BYPASS GRAFT  11/2001   • ENDOSCOPY N/A 5/29/2020    Procedure: ESOPHAGOGASTRODUODENOSCOPY with biopsies;  Surgeon: Amanda Lovelace MD;  Location: [facility] ENDOSCOPY;  Service: Gastroenterology;  Laterality: N/A;  pre-anemia, dark stools  post-esophagitis, hiatal hernia   • ENDOSCOPY N/A 9/15/2020    Procedure: ESOPHAGOGASTRODUODENOSCOPY WITH BIOPSIES;  Surgeon: Jose Enrique Louie MD;  Location: [facility] ENDOSCOPY;  Service: Gastroenterology;  Laterality: N/A;  pre: history of melena and esophagitis  post: mild esophagitis and gastritis, small hiatal hernia   • HERNIA REPAIR Left 12/05/2019   • TONSILLECTOMY     • VASECTOMY        General Information     Row Name 12/18/21 1616          Physical Therapy Time and Intention    Document Type therapy note (daily note)  -DB     Mode of Treatment individual therapy; physical therapy  -DB     Row Name 12/18/21 1616          General Information    Patient Profile Reviewed yes  -DB           User Key  (r) = Recorded By, (t) = Taken By, (c) = Cosigned By    Initials Name Provider Type    DB Theresa De Luna PT Physical Therapist               Mobility     Row Name 12/18/21 1617          Sit-Stand Transfer    Sit-Stand [place] (Transfers) standby assist; verbal cues  -DB     Assistive Device (Sit-Stand Transfers) cane, straight  -DB     Row Name 12/18/21 1617          Gait/Stairs (Locomotion)    [place] Level (Gait) contact guard; verbal cues  -DB     Assistive Device (Gait) cane, straight  -DB     Distance in Feet (Gait) 200'  -DB     Deviations/Abnormal Patterns (Gait) gait speed decreased; stride length decreased; eli decreased  -DB     Bilateral Gait Deviations  forward flexed posture  -DB     [place] Level (Stairs) verbal cues; contact guard  -DB     Handrail Location (Stairs) both sides  -DB     Number of Steps (Stairs) 9  -DB     Ascending Technique (Stairs) step-to-step  -DB     Descending Technique (Stairs) step-to-step  -DB     Comment (Gait/Stairs) Pt ambulates with straight cane, refusing to attempt use of RW. Pt occasionally holding onto wall or HR when ambulating, but no LOB  -DB           User Key  (r) = Recorded By, (t) = Taken By, (c) = Cosigned By    Initials Name Provider Type    DB Theresa De Luna, PT Physical Therapist               Obj/Interventions     Row Name 12/18/21 1619          Balance    Balance Assessment sitting static balance; sitting dynamic balance; standing static balance; standing dynamic balance  -DB     Static Sitting Balance WFL; sitting, edge of bed  -DB     Dynamic Sitting Balance WFL; sitting, edge of bed  -DB     Static Standing Balance WFL; supported  -DB     Dynamic Standing Balance mild impairment; supported  -DB     Balance Interventions sitting; standing; sit to stand  -DB           User Key  (r) = Recorded By, (t) = Taken By, (c) = Cosigned By    Initials Name Provider Type    DB Theresa De Luna, PT Physical Therapist               Goals/Plan    No documentation.                Clinical Impression     Row Name 12/18/21 1610          Pain Scale: Numbers Pre/Post-Treatment    Pain Intervention(s) Ambulation/increased activity; Repositioned  -DB     Row Name 12/18/21 1617          Plan of Care Review    Plan of Care Reviewed With patient  -DB     Progress improving  -DB     Outcome Summary Pt agreeable to work with PT today. SBA for STS, CGA for ambulation 200' with SPC and CGA for performing 9 steps. Seated rest break in between ambulation and stairs. Pt continues to benefit from skilled PT.  -DB     Row Name 12/18/21 1616          Vital Signs    O2 Delivery Pre Treatment room air  -DB     Intra SpO2 (%) 95  -DB     O2  Delivery Intra Treatment room air  -DB     Post SpO2 (%) 90  -DB     O2 Delivery Post Treatment room air  -DB     Pre Patient Position Sitting  -DB     Intra Patient Position Standing  -DB     Post Patient Position Sitting  -DB     Row Name 12/18/21 1619          Positioning and Restraints    Pre-Treatment Position sitting in chair/recliner  -DB     Post Treatment Position chair  -DB     In Chair reclined; call light within reach; encouraged to call for assist; exit alarm on  -DB           User Key  (r) = Recorded By, (t) = Taken By, (c) = Cosigned By    Initials Name Provider Type    Theresa Bunn, DENISSE Physical Therapist               Outcome Measures     Row Name 12/18/21 1620          How much help from another person do you currently need...    Turning from your back to your side while in flat bed without using bedrails? 4  -DB     Moving from lying on back to sitting on the side of a flat bed without bedrails? 4  -DB     Moving to and from a bed to a chair (including a wheelchair)? 3  -DB     Standing up from a chair using your arms (e.g., wheelchair, bedside chair)? 3  -DB     Climbing 3-5 steps with a railing? 3  -DB     To walk in hospital room? 3  -DB     AM-PAC 6 Clicks Score (PT) 20  -DB     Row Name 12/18/21 1620          Functional Assessment    Outcome Measure Options AM-PAC 6 Clicks Basic Mobility (PT)  -DB           User Key  (r) = Recorded By, (t) = Taken By, (c) = Cosigned By    Initials Name Provider Type    Theresa Bunn, DENISSE Physical Therapist                             Physical Therapy Education                 Title: PT OT SLP Therapies (Done)     Topic: Physical Therapy (Done)     Point: Mobility training (Done)     Learning Progress Summary           Patient Acceptance, E, VU by DB at 12/18/2021 1621    Acceptance, E, VU by TG at 12/18/2021 0507    Acceptance, E, VU by VALERIE at 12/17/2021 0929    Acceptance, E, VU by EM at 12/15/2021 1028                   Point: Home exercise  program (Done)     Learning Progress Summary           Patient Acceptance, E, VU by DB at 12/18/2021 1621    Acceptance, E, VU by TG at 12/18/2021 0507                   Point: Body mechanics (Done)     Learning Progress Summary           Patient Acceptance, E, VU by DB at 12/18/2021 1621    Acceptance, E, VU by TG at 12/18/2021 0507    Acceptance, E, VU by DJ at 12/17/2021 0929                   Point: Precautions (Done)     Learning Progress Summary           Patient Acceptance, E, VU by DB at 12/18/2021 1621    Acceptance, E, VU by TG at 12/18/2021 0507    Acceptance, E, VU by DJ at 12/17/2021 0929                               User Key     Initials Effective Dates Name Provider Type Discipline    EM 06/16/21 -  Mary Jo Hernandez, PT Physical Therapist PT    TG 06/16/21 -  Kirstin Almonte RN Registered Nurse Nurse    DB 06/16/21 -  Theresa De Luna, PT Physical Therapist PT    DJ 10/25/19 -  Leslie Rodriges, DENISSE Physical Therapist PT              PT Recommendation and Plan     Plan of Care Reviewed With: patient  Progress: improving  Outcome Summary: Pt agreeable to work with PT today. SBA for STS, CGA for ambulation 200' with SPC and CGA for performing 9 steps. Seated rest break in between ambulation and stairs. Pt continues to benefit from skilled PT.     Time Calculation:    PT Charges     Row Name 12/18/21 1621 12/18/21 0815          Time Calculation    Start Time 1557  -DB --     Stop Time 1615  -DB --     Time Calculation (min) 18 min  -DB --     PT Received On 12/18/21  -DB --     PT - Next Appointment 12/20/21  -DB 12/19/21  -AR            Time Calculation- PT    Total Timed Code Minutes- PT 18 minute(s)  -DB --           User Key  (r) = Recorded By, (t) = Taken By, (c) = Cosigned By    Initials Name Provider Type    AR Kellen Archibald, PT Physical Therapist    DB Theresa De Luna, PT Physical Therapist              Therapy Charges for Today     Code Description Service Date Service Provider Modifiers  Qty    [id number]  PT THERAPEUTIC ACT EA 15 MIN 12/18/2021 Theresa De Luna, PT GP 1          PT G-Codes  Outcome Measure Options: AM-PAC 6 Clicks Basic Mobility (PT)  AM-PAC 6 Clicks Score (PT): 20  AM-PAC 6 Clicks Score (OT): 19    Theresa De Luna PT  12/18/2021

## 2021-12-18 NOTE — PLAN OF CARE
Goal Outcome Evaluation:  Plan of Care Reviewed With: patient        Progress: improving  Outcome Summary: Pt agreeable to work with PT today. SBA for STS, CGA for ambulation 200' with SPC and CGA for performing 9 steps. Seated rest break in between ambulation and stairs. Pt continues to benefit from skilled PT.

## 2021-12-18 NOTE — PROGRESS NOTES
NEPHROLOGY PROGRESS NOTE    PATIENT IDENTIFICATION:   Name:  Carlito Mo      MRN:  [id number]     66 y.o.  male             Reason for visit: SYLVAIN    SUBJECTIVE:     Seen and examined.  Feeling good.  Denies shortness of air        OBJECTIVE:  Vitals:    12/17/21 1942 12/17/21 2323 12/18/21 0522 12/18/21 0737   BP: 113/65 133/77  124/68   BP Location: Right arm Right arm  Right arm   Patient Position: Lying Lying  Lying   Pulse: 58 63     Resp: 16 16  15   Temp: 97.9 °F (36.6 °C) 98 °F (36.7 °C)  97.4 °F (36.3 °C)   TempSrc: Oral Oral  Oral   SpO2: 92% 93%  97%   Weight:   85.1 kg (187 lb 9.6 oz)    Height:               Body mass index is 25.44 kg/m².    Intake/Output Summary (Last 24 hours) at 12/18/2021 0832  Last data filed at 12/17/2021 1400  Gross per 24 hour   Intake 240 ml   Output --   Net 240 ml         Exam:  GEN:  No distress, appears stated age  EYES:   Anicteric sclera  ENT:    External ears/nose normal, MM are moist  NECK:  No adenopathy, JVP none  LUNGS: Normal chest on inspection; not labored  CV:  Normal S1S2, without murmur  ABD:  Non-tender, non-distended, no hepatosplenomegaly, +BS  EXT:  No edema; no cyanosis; clubbing    Scheduled meds:  amLODIPine, 5 mg, Oral, Daily  apixaban, 2.5 mg, Oral, Q12H  aspirin, 81 mg, Oral, Daily  atorvastatin, 80 mg, Oral, Nightly  buPROPion XL, 150 mg, Oral, Daily  carvedilol, 25 mg, Oral, BID With Meals  epoetin lane/lane-epbx, 10,000 Units, Subcutaneous, Weekly  ferrous sulfate, 325 mg, Oral, Daily With Breakfast & Dinner  folic acid, 1,000 mcg, Oral, Daily  insulin lispro, 0-9 Units, Subcutaneous, 4x Daily With Meals & Nightly  levothyroxine, 75 mcg, Oral, Q AM  polyethylene glycol, 17 g, Oral, Daily  senna-docusate sodium, 2 tablet, Oral, BID  sodium chloride, 10 mL, Intravenous, Q12H  tamsulosin, 0.4 mg, Oral, Nightly  vitamin B-12, 1,000 mcg, Oral, Daily  vitamin D, 50,000 Units, Oral, Q7 Days      IV meds:                      hold, 1 each        Data  Review:    Results from last 7 days   Lab Units 12/18/21  0502 12/17/21  0556 12/16/21  0625 12/14/21  0421 12/13/21  1614   SODIUM mmol/L 138 140 137   < > 138   POTASSIUM mmol/L 4.5 5.0 4.4   < > 4.0   CHLORIDE mmol/L 105 108* 106   < > 107   CO2 mmol/L 23.5 23.3 23.4   < > 23.7   BUN mg/dL 57* 47* 39*   < > 26*   CREATININE mg/dL 3.62* 3.49* 3.07*   < > 2.64*   CALCIUM mg/dL 8.4* 8.2* 8.2*   < > 9.0   BILIRUBIN mg/dL  --   --   --   --  0.2   ALK PHOS U/L  --   --   --   --  86   ALT (SGPT) U/L  --   --   --   --  8   AST (SGOT) U/L  --   --   --   --  13   GLUCOSE mg/dL 81 102* 62*   < > 190*    < > = values in this interval not displayed.       Estimated Creatinine Clearance: 24.2 mL/min (A) (by C-G formula based on SCr of 3.62 mg/dL (H)).    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)    Acquired hypothyroidism    Hypertensive urgency    Pleural effusion    YAW (obstructive sleep apnea)    Coronary artery disease involving native coronary artery of native heart without angina pectoris    Chronically elevated troponin    Anemia, chronic renal failure, stage 3 (moderate) (HCC)    Essential hypertension    Patent foramen ovale    Pleural effusion, bilateral    Cardiomyopathy (HCC)    Lower abdominal pain    Diarrhea    CKD (chronic kidney disease) stage 4, GFR 15-29 ml/min (HCC)      -Acute kidney injury on chronic kidney disease stage IV: His kidney function has been fluctuating but most recently had been in the range between  2 and 2.3 mg/dL.  Creatinine today is 3.6 mg/dL.  Patient appears to be euvolemic.  He is going to undergo thoracentesis.  The patient is noncompliant and we had lengthy discussion about importance of compliance.     -Proteinuria: Most likely due to diabetic nephropathy.  Awaiting  free serum kappa light chain  -Pleural effusion:  Had thoracentesis yesterday December 15  -Diabetes mellitus type 2: Noncompliant with the treatment  -Hypertension  -Acute on chronic congestive heart failure last ejection fraction 31-35% with fixed restrictive diastolic congestive heart failure  -Anemia of chronic illness: Last hemoglobin 9.4.  Last iron profile appears to be adequate  -Coronary artery disease  -History of stroke  -History of obstructive sleep apnea  -History of noncompliance      PLAN:    Kidney function has worsened further to 3.4 mg/dL.  Plan to hold diuretic. expect kidney function to stabilize over the next 48 to 72 hours  Patient is likely heading toward dialysis in the near future  Continue to monitor kidney function and will hold on discharge that his kidney function starts at the level of  Discussed with the patient importance of compliance  Surveillance labs    Vitor Parmar MD  12/18/2021    08:32 EST

## 2021-12-18 NOTE — PLAN OF CARE
Goal Outcome Evaluation:              Outcome Summary: Pt seemed to rest well however pt denies this. Pt had no c/o pain or discomfort, Cont to monitor, safety maintained.

## 2021-12-19 LAB
ANION GAP SERPL CALCULATED.3IONS-SCNC: 11.2 MMOL/L (ref 5–15)
BUN SERPL-MCNC: 60 MG/DL (ref 8–23)
BUN/CREAT SERPL: 17.1 (ref 7–25)
CALCIUM SPEC-SCNC: 8.5 MG/DL (ref 8.6–10.5)
CHLORIDE SERPL-SCNC: 104 MMOL/L (ref 98–107)
CO2 SERPL-SCNC: 21.8 MMOL/L (ref 22–29)
CREAT SERPL-MCNC: 3.5 MG/DL (ref 0.76–1.27)
DEPRECATED RDW RBC AUTO: 38.8 FL (ref 37–54)
ERYTHROCYTE [DISTWIDTH] IN BLOOD BY AUTOMATED COUNT: 13.3 % (ref 12.3–15.4)
GFR SERPL CREATININE-BSD FRML MDRD: 18 ML/MIN/1.73
GLUCOSE BLDC GLUCOMTR-MCNC: 110 MG/DL (ref 70–130)
GLUCOSE BLDC GLUCOMTR-MCNC: 143 MG/DL (ref 70–130)
GLUCOSE BLDC GLUCOMTR-MCNC: 157 MG/DL (ref 70–130)
GLUCOSE BLDC GLUCOMTR-MCNC: 169 MG/DL (ref 70–130)
GLUCOSE SERPL-MCNC: 106 MG/DL (ref 65–99)
HCT VFR BLD AUTO: 29.3 % (ref 37.5–51)
HGB BLD-MCNC: 9.5 G/DL (ref 13–17.7)
MCH RBC QN AUTO: 26.1 PG (ref 26.6–33)
MCHC RBC AUTO-ENTMCNC: 32.4 G/DL (ref 31.5–35.7)
MCV RBC AUTO: 80.5 FL (ref 79–97)
PLATELET # BLD AUTO: 223 10*3/MM3 (ref 140–450)
PMV BLD AUTO: 10.8 FL (ref 6–12)
POTASSIUM SERPL-SCNC: 4.7 MMOL/L (ref 3.5–5.2)
RBC # BLD AUTO: 3.64 10*6/MM3 (ref 4.14–5.8)
SODIUM SERPL-SCNC: 137 MMOL/L (ref 136–145)
WBC NRBC COR # BLD: 5.08 10*3/MM3 (ref 3.4–10.8)

## 2021-12-19 PROCEDURE — 85027 COMPLETE CBC AUTOMATED: CPT | Performed by: STUDENT IN AN ORGANIZED HEALTH CARE EDUCATION/TRAINING PROGRAM

## 2021-12-19 PROCEDURE — 82962 GLUCOSE BLOOD TEST: CPT

## 2021-12-19 PROCEDURE — 80048 BASIC METABOLIC PNL TOTAL CA: CPT | Performed by: STUDENT IN AN ORGANIZED HEALTH CARE EDUCATION/TRAINING PROGRAM

## 2021-12-19 PROCEDURE — 63710000001 INSULIN LISPRO (HUMAN) PER 5 UNITS: Performed by: NURSE PRACTITIONER

## 2021-12-19 RX ADMIN — CARVEDILOL 25 MG: 25 TABLET, FILM COATED ORAL at 08:48

## 2021-12-19 RX ADMIN — FOLIC ACID 1000 MCG: 1 TABLET ORAL at 08:48

## 2021-12-19 RX ADMIN — ASPIRIN 81 MG: 81 TABLET, COATED ORAL at 08:48

## 2021-12-19 RX ADMIN — CARVEDILOL 25 MG: 25 TABLET, FILM COATED ORAL at 18:15

## 2021-12-19 RX ADMIN — FERROUS SULFATE TAB 325 MG (65 MG ELEMENTAL FE) 325 MG: 325 (65 FE) TAB at 18:15

## 2021-12-19 RX ADMIN — APIXABAN 2.5 MG: 5 TABLET, FILM COATED ORAL at 08:48

## 2021-12-19 RX ADMIN — TAMSULOSIN HYDROCHLORIDE 0.4 MG: 0.4 CAPSULE ORAL at 20:39

## 2021-12-19 RX ADMIN — SODIUM CHLORIDE, PRESERVATIVE FREE 10 ML: 5 INJECTION INTRAVENOUS at 20:41

## 2021-12-19 RX ADMIN — AMLODIPINE BESYLATE 5 MG: 5 TABLET ORAL at 08:48

## 2021-12-19 RX ADMIN — APIXABAN 2.5 MG: 5 TABLET, FILM COATED ORAL at 20:38

## 2021-12-19 RX ADMIN — ATORVASTATIN CALCIUM 80 MG: 80 TABLET, FILM COATED ORAL at 20:38

## 2021-12-19 RX ADMIN — SODIUM CHLORIDE, PRESERVATIVE FREE 10 ML: 5 INJECTION INTRAVENOUS at 08:49

## 2021-12-19 RX ADMIN — INSULIN LISPRO 2 UNITS: 100 INJECTION, SOLUTION INTRAVENOUS; SUBCUTANEOUS at 18:15

## 2021-12-19 RX ADMIN — INSULIN LISPRO 2 UNITS: 100 INJECTION, SOLUTION INTRAVENOUS; SUBCUTANEOUS at 12:18

## 2021-12-19 RX ADMIN — FERROUS SULFATE TAB 325 MG (65 MG ELEMENTAL FE) 325 MG: 325 (65 FE) TAB at 08:48

## 2021-12-19 RX ADMIN — BUPROPION HYDROCHLORIDE 150 MG: 150 TABLET, EXTENDED RELEASE ORAL at 08:49

## 2021-12-19 RX ADMIN — Medication 1000 MCG: at 08:48

## 2021-12-19 RX ADMIN — LEVOTHYROXINE SODIUM 75 MCG: 0.07 TABLET ORAL at 06:56

## 2021-12-19 NOTE — PROGRESS NOTES
PROGRESS NOTE  Patient Name: Carlito Mo  Age/Sex: 66 y.o. male  : 1955  MRN: [id number]    Date of Admission: 2021  Date of Encounter Visit: 21   LOS: 5 days   Patient Care Team:  Florencia Caballero MD as PCP - General (Internal Medicine)  Amber Murguia MD as Consulting Physician (Cardiology)  Sera La RPH as Pharmacist  OSeth Conte MD as Surgeon (General Surgery)  Kim Hoyos MD PhD as Consulting Physician (Hematology and Oncology)  Jerome Diallo MD as Referring Physician (Family Medicine)  Jose Enrique Louie MD as Consulting Physician (Gastroenterology)  Nima Huddleston MD as Consulting Physician (Nephrology)    Chief Complaint:pleural effusion    Hospital course: had 2 liters drained from the right chest, left thoracentesis on hold. Currently on RA, diuretics per renal, currently on hold because of the worsening renal failure. No complaints at rest, positive DE LEON.       REVIEW OF SYSTEMS:   CONSTITUTIONAL: no fever or chills  CARDIOVASCULAR: No chest pain, chest pressure or chest discomfort. No palpitations positive edema    RESPIRATORY:  DE LEON. no cough or sputum.   GASTROINTESTINAL: No anorexia, nausea, vomiting or diarrhea. No abdominal pain or blood.   HEMATOLOGIC: No bleeding or bruising.     Ventilator/Non-Invasive Ventilation Settings (From admission, onward)             Start     Ordered    21 1356  NIPPV (CPAP or BIPAP)  Until Discontinued,   Status:  Canceled        Question Answer Comment   Indication: Sleep Apnea    Type: AutoCPAP    Min Pressure 10        21 1356                  Vital Signs  Temp:  [97.6 °F (36.4 °C)] 97.6 °F (36.4 °C)  Heart Rate:  [59-61] 60  Resp:  [17-18] 17  BP: ()/(57-81) 144/81  SpO2:  [95 %-98 %] 97 %  on    Device (Oxygen Therapy): room air    Intake/Output Summary (Last 24 hours) at 2021 1338  Last data filed at 2021 1222  Gross per 24 hour   Intake 440 ml   Output 1525 ml   Net -1085 ml  "    Flowsheet Rows      First Filed Value   Admission Height 182.9 cm (72\") Documented at 12/13/2021 2116   Admission Weight 86.2 kg (190 lb) Documented at 12/13/2021 2116        Body mass index is 25.24 kg/m².      12/17/21  0422 12/18/21  0522 12/19/21  0519   Weight: 85.7 kg (189 lb) 85.1 kg (187 lb 9.6 oz) 84.4 kg (186 lb 1.6 oz)       Physical Exam:  GEN:  No acute distress, alert, cooperative, well developed   breathing is non  labored     Results Review:    Results From Last 14 Days   Lab Units 12/16/21  0625 12/15/21  0410   FERRITIN ng/mL 237.00  --    LDH U/L  --  166     Results from last 7 days   Lab Units 12/19/21  0556 12/18/21  0502 12/17/21  0556 12/16/21  0625 12/15/21  0410 12/14/21  0421 12/13/21  1614   SODIUM mmol/L 137 138 140 137 142 139 138   POTASSIUM mmol/L 4.7 4.5 5.0 4.4 4.2 3.7 4.0   CHLORIDE mmol/L 104 105 108* 106 109* 110* 107   CO2 mmol/L 21.8* 23.5 23.3 23.4 24.1 21.8* 23.7   BUN mg/dL 60* 57* 47* 39* 29* 24* 26*   CREATININE mg/dL 3.50* 3.62* 3.49* 3.07* 2.65* 2.23* 2.64*   CALCIUM mg/dL 8.5* 8.4* 8.2* 8.2* 8.1* 8.2* 9.0   AST (SGOT) U/L  --   --   --   --   --   --  13   ALT (SGPT) U/L  --   --   --   --   --   --  8   ANION GAP mmol/L 11.2 9.5 8.7 7.6 8.9 7.2 7.3   ALBUMIN g/dL  --   --   --   --   --   --  3.10*         Results from last 7 days   Lab Units 12/14/21  0421   TSH uIU/mL 4.300*     Results from last 7 days   Lab Units 12/13/21  1614   PROBNP pg/mL 7,027.0*     Results from last 7 days   Lab Units 12/19/21  0556 12/18/21  0502 12/17/21  0556 12/16/21  0625 12/15/21  0410 12/14/21  0421 12/13/21  1614   WBC 10*3/mm3 5.08 6.48 5.91 5.32 5.68 6.52 6.11   HEMOGLOBIN g/dL 9.5* 9.4* 9.4* 9.6* 9.4* 9.9* 11.7*   HEMATOCRIT % 29.3* 29.2* 29.6* 31.1* 29.5* 30.2* 37.0*   PLATELETS 10*3/mm3 223 222 229 240 234 233 283   MCV fL 80.5 81.1 83.1 84.7 82.9 81.6 84.3   NEUTROPHIL % %  --   --   --   --   --  63.9 72.5   LYMPHOCYTE % %  --   --   --   --   --  22.1 15.9*   MONOCYTES % " %  --   --   --   --   --  9.5 7.2   EOSINOPHIL % %  --   --   --   --   --  4.1 3.8   BASOPHIL % %  --   --   --   --   --  0.2 0.3   IMM GRAN % %  --   --   --   --   --  0.2 0.3         Results from last 7 days   Lab Units 12/15/21  0410   MAGNESIUM mg/dL 1.8           Invalid input(s): LDLCALC      Results from last 7 days   Lab Units 12/15/21  0410   HEMOGLOBIN A1C % 7.68*     Glucose   Date/Time Value Ref Range Status   12/19/2021 1109 157 (H) 70 - 130 mg/dL Final     Comment:     Meter: [id number] : [id number] Xavier RAMIREZ   12/19/2021 0631 110 70 - 130 mg/dL Final     Comment:     Meter: [id number] : [id number] Rene VILLALOBOS   12/18/2021 2055 213 (H) 70 - 130 mg/dL Final     Comment:     Meter: [id number] : [id number] Rene VILLALOBOS   12/18/2021 1652 175 (H) 70 - 130 mg/dL Final     Comment:     Meter: [id number] : [id number] Lukasz Landaverde   12/18/2021 1120 134 (H) 70 - 130 mg/dL Final     Comment:     Meter: [id number] : [id number] Xavier RAMIREZ   12/18/2021 0641 88 70 - 130 mg/dL Final     Comment:     Meter: [id number] : [id number] Sonny RAMIREZ   12/17/2021 2045 298 (H) 70 - 130 mg/dL Final     Comment:     Meter: [id number] : [id number] Sonny RAMIREZ   12/17/2021 1621 112 70 - 130 mg/dL Final     Comment:     Meter: [id number] : [id number] Xavier RAMIREZ         Results from last 7 days   Lab Units 12/15/21  1040   BODYFLDCX  No growth at 4 days     Results from last 7 days   Lab Units 12/13/21  1628   NITRITE UA  Negative   WBC UA /HPF 3-5*   BACTERIA UA /HPF None Seen   SQUAM EPITHEL UA /HPF None Seen     Results from last 7 days   Lab Units 12/13/21  2304   COVID19  Not Detected     Results from last 7 days   Lab Units 12/15/21  1716   CREATININE UR mg/dL 69.7   PROTEIN TOTAL URINE mg/dL 369.0           Imaging:   Imaging Results (All)     Procedure Component Value Units Date/Time     I reviewed the patient's new clinical results.  I  personally viewed and interpreted the patient's imaging results:        Medication Review:   amLODIPine, 5 mg, Oral, Daily  apixaban, 2.5 mg, Oral, Q12H  aspirin, 81 mg, Oral, Daily  atorvastatin, 80 mg, Oral, Nightly  buPROPion XL, 150 mg, Oral, Daily  carvedilol, 25 mg, Oral, BID With Meals  epoetin lane/lane-epbx, 10,000 Units, Subcutaneous, Weekly  ferrous sulfate, 325 mg, Oral, Daily With Breakfast & Dinner  folic acid, 1,000 mcg, Oral, Daily  insulin lispro, 0-9 Units, Subcutaneous, 4x Daily With Meals & Nightly  levothyroxine, 75 mcg, Oral, Q AM  polyethylene glycol, 17 g, Oral, Daily  senna-docusate sodium, 2 tablet, Oral, BID  sodium chloride, 10 mL, Intravenous, Q12H  tamsulosin, 0.4 mg, Oral, Nightly  vitamin B-12, 1,000 mcg, Oral, Daily  vitamin D, 50,000 Units, Oral, Q7 Days        hold, 1 each        ASSESSMENT:   1. Bilateral pleural effusion right more than left status post thoracentesis 2 L on 12/15/2021  2. Acute on chronic systolic heart failure  3. Medical noncompliance  4. Previous CVA on Eliquis; noncompliant  5. CKD  6. CAD s/p prior set CABG  7. Previous GI bleed  8. YAW on CPAP    PLAN:  Patient has been doing well compensating on room air with no need for addressing the left-sided effusion.  Will follow on an as-needed basis, please contact us in case of any worsening respiratory complaints.  Disposition: per primary team  Ivelisse Nicholson MD  12/19/21  13:38 EST         Dictated utilizing Dragon dictation

## 2021-12-19 NOTE — PLAN OF CARE
Goal Outcome Evaluation:  Plan of Care Reviewed With: patient, spouse-Vs as noted-eating well-accucks as noted-pt denies pain-presently on room air-up with assistance-family at side-safety maintained this shift-hoping to be discharged tomorrow

## 2021-12-19 NOTE — PROGRESS NOTES
Name: Carlito Mo ADMIT: 2021   : 1955  PCP: Florencia Caballero MD    MRN: [id number] LOS: 5 days   AGE/SEX: 66 y.o. male  ROOM: [facility]     Subjective   Subjective   Patient is resting in bed, speaking with his wife on his cell phone.  He reports feeling well, still feels like his legs are swollen.  His kidney function has stabilized overnight, his diuretics were continued to be held per nephrology.    Review of Systems  Denies chest pain, abdominal pain, dysuria, fevers     Objective   Objective   Vital Signs  Temp:  [97.6 °F (36.4 °C)] 97.6 °F (36.4 °C)  Heart Rate:  [59-61] 60  Resp:  [17-18] 17  BP: (116-144)/(65-81) 144/81  SpO2:  [97 %-98 %] 97 %  on   ;   Device (Oxygen Therapy): room air  Body mass index is 25.24 kg/m².  Physical Exam  Constitutional:       General: He is not in acute distress.     Appearance: He is not diaphoretic.      Comments: Chronically ill-appearing   HENT:      Mouth/Throat:      Mouth: Mucous membranes are moist.   Eyes:      General: No scleral icterus.  Cardiovascular:      Rate and Rhythm: Normal rate and regular rhythm.      Heart sounds: No murmur heard.  No gallop.    Pulmonary:      Effort: Pulmonary effort is normal. No respiratory distress.      Breath sounds: No wheezing or rhonchi.   Abdominal:      General: There is no distension.      Palpations: Abdomen is soft.      Tenderness: There is no abdominal tenderness. There is no guarding.   Musculoskeletal:         General: Swelling (1+ to ankles (improving)) present.   Skin:     General: Skin is warm and dry.   Neurological:      Mental Status: He is alert.         Results Review     I reviewed the patient's new clinical results.  Results from last 7 days   Lab Units 21  0556 21  0502 21  0556 21  0625   WBC 10*3/mm3 5.08 6.48 5.91 5.32   HEMOGLOBIN g/dL 9.5* 9.4* 9.4* 9.6*   PLATELETS 10*3/mm3 223 222 229 240     Results from last 7 days   Lab Units 21  0556 21  0502  12/17/21  0556 12/16/21  0625   SODIUM mmol/L 137 138 140 137   POTASSIUM mmol/L 4.7 4.5 5.0 4.4   CHLORIDE mmol/L 104 105 108* 106   CO2 mmol/L 21.8* 23.5 23.3 23.4   BUN mg/dL 60* 57* 47* 39*   CREATININE mg/dL 3.50* 3.62* 3.49* 3.07*   GLUCOSE mg/dL 106* 81 102* 62*   Estimated Creatinine Clearance: 24.8 mL/min (A) (by C-G formula based on SCr of 3.5 mg/dL (H)).  Results from last 7 days   Lab Units 12/13/21  1614   ALBUMIN g/dL 3.10*   BILIRUBIN mg/dL 0.2   ALK PHOS U/L 86   AST (SGOT) U/L 13   ALT (SGPT) U/L 8     Results from last 7 days   Lab Units 12/19/21  0556 12/18/21  0502 12/17/21  0556 12/16/21  0625 12/15/21  0410 12/15/21  0410 12/14/21  0421 12/13/21  1614   CALCIUM mg/dL 8.5* 8.4* 8.2* 8.2*   < > 8.1*   < > 9.0   ALBUMIN g/dL  --   --   --   --   --   --   --  3.10*   MAGNESIUM mg/dL  --   --   --   --   --  1.8  --   --    PHOSPHORUS mg/dL  --   --   --   --   --  4.4  --   --     < > = values in this interval not displayed.       COVID19   Date Value Ref Range Status   12/13/2021 Not Detected Not Detected - Ref. Range Final   05/20/2021 Not Detected Not Detected - Ref. Range Final     Glucose   Date/Time Value Ref Range Status   12/19/2021 1109 157 (H) 70 - 130 mg/dL Final     Comment:     Meter: [id number] : [id number] Park Hayley JAMES   12/19/2021 0631 110 70 - 130 mg/dL Final     Comment:     Meter: [id number] : [id number] Rene Murray CNA   12/18/2021 2055 213 (H) 70 - 130 mg/dL Final     Comment:     Meter: [id number] : [id number] López Terri CNA   12/18/2021 1652 175 (H) 70 - 130 mg/dL Final     Comment:     Meter: [id number] : [id number] Lukasz Landaverde   12/18/2021 1120 134 (H) 70 - 130 mg/dL Final     Comment:     Meter: [id number] : [id number] Xavier RAMIREZ   12/18/2021 0641 88 70 - 130 mg/dL Final     Comment:     Meter: [id number] : [id number] Sonny RAMIREZ   12/17/2021 2045 298 (H) 70 - 130 mg/dL Final     Comment:     Meter: [id number] :  [id number] [facility]       XR Chest 2 View  Narrative: TWO VIEW CHEST X-RAY     HISTORY: Postop thoracentesis.     TECHNIQUE: A total of three x-ray images of the chest are provided and  are correlated with images from a sonographically guided right  thoracentesis of 2 L performed earlier today and chest x-ray 12/13/2021.     FINDINGS: There are sternal wires. There has been significant  improvement in the appearance of the right hemithorax. The lateral view  shows persistent blunting of the costophrenic angle posteriorly but the  right pleural effusion is substantially decreased in size. There is also  a small residual left pleural effusion. Central pulmonary vasculature is  engorged and there is mild interstitial edema. There is no pneumothorax.     Impression: No pneumothorax following right thoracentesis. There is  persistent pulmonary vascular engorgement with mild interstitial edema.     This report was finalized on 12/16/2021 12:50 PM by Dr. Klever Guerrero M.D.       I reviewed the patient's daily medications.  Scheduled Medications  amLODIPine, 5 mg, Oral, Daily  apixaban, 2.5 mg, Oral, Q12H  aspirin, 81 mg, Oral, Daily  atorvastatin, 80 mg, Oral, Nightly  buPROPion XL, 150 mg, Oral, Daily  carvedilol, 25 mg, Oral, BID With Meals  epoetin lane/alne-epbx, 10,000 Units, Subcutaneous, Weekly  ferrous sulfate, 325 mg, Oral, Daily With Breakfast & Dinner  folic acid, 1,000 mcg, Oral, Daily  insulin lispro, 0-9 Units, Subcutaneous, 4x Daily With Meals & Nightly  levothyroxine, 75 mcg, Oral, Q AM  polyethylene glycol, 17 g, Oral, Daily  senna-docusate sodium, 2 tablet, Oral, BID  sodium chloride, 10 mL, Intravenous, Q12H  tamsulosin, 0.4 mg, Oral, Nightly  vitamin B-12, 1,000 mcg, Oral, Daily  vitamin D, 50,000 Units, Oral, Q7 Days    Infusions  hold, 1 each    Diet  Diet Regular; Cardiac, Consistent Carbohydrate       Assessment/Plan     Active Hospital Problems    Diagnosis  POA   • **Acute on chronic  combined systolic and diastolic congestive heart failure (HCC) [I50.43]  Yes   • CKD (chronic kidney disease) stage 4, GFR 15-29 ml/min (HCC) [N18.4]  Yes   • Diarrhea [R19.7]  Yes   • Lower abdominal pain [R10.30]  Yes   • Cardiomyopathy (HCC) [I42.9]  Yes   • Patent foramen ovale [Q21.1]  Not Applicable   • Pleural effusion, bilateral [J90]  Yes   • Essential hypertension [I10]  Yes   • Anemia, chronic renal failure, stage 3 (moderate) (HCC) [N18.30, D63.1]  Yes   • Chronically elevated troponin [R77.8]  Yes   • Coronary artery disease involving native coronary artery of native heart without angina pectoris [I25.10]  Yes   • YAW (obstructive sleep apnea) [G47.33]  Yes   • Pleural effusion [J90]  Yes   • Hypertensive urgency [I16.0]  Yes   • Acquired hypothyroidism [E03.9]  Yes   • Type 2 diabetes mellitus with ophthalmic complication ([facility]) [E11.39]  Yes   • Vitamin D deficiency [E55.9]  Yes      Resolved Hospital Problems   No resolved problems to display.       66-year-old male with a history of diabetes, coronary artery disease status post CABG, CKD, chronic systolic heart failure, and CVA in May 2021 who presented with abdominal pain, nausea and vomiting.  He is admitted for acute on chronic combined systolic and diastolic heart failure and bilateral pleural effusions, likely due to medication noncompliance.    Acute on chronic systolic and diastolic heart failure  - Torsemide 60 mg daily- held.  Continue beta-blocker and ACE inhibitor.  Cardiology following  - nephrology following to assist with diuresis in the setting of CKD.  His lower extremity edema is improving daily  -Creatinine stabilizes today, lisinopril and torsemide has been held by nephrology, if kidney function remains stable may be able to discharge tomorrow per nephrology     Bilateral pleural effusions  -Status post right thoracentesis, 2 L removed on 12/15.  Pulmonology recommends hold off on left sided thoracentesis unless his respiratory  status worsens.    -Diuresis-held     History of stroke, May 2021  -On apixaban     Type 2 diabetes  -Stopped basal insulin due to some hypoglycemia, continue sliding scale insulin     CKD stage IV  -Baseline creatinine appears to be 2-2.3.    -Creatinine worsening again today, diuresis held by nephrology     Hypertensive urgency- resolved  -Resume home amlodipine, Coreg,       · Eliquis (home med) for DVT prophylaxis.   · Full code.  · Discussed with patient and nursing staff.  And family  · Anticipate discharge home when cleared by consultants.      Odilia Ayala MD  [facility]  12/19/21  13:42 EST    Patient was placed in face mask on first look.  I wore protective equipment throughout this patient encounter including a face mask, gloves and protective eyewear.  Hand hygiene was performed protective equipment and after removal when leaving the room.

## 2021-12-19 NOTE — PROGRESS NOTES
NEPHROLOGY PROGRESS NOTE    PATIENT IDENTIFICATION:   Name:  Carlito Mo      MRN:  [id number]     66 y.o.  male             Reason for visit: SYLVAIN    SUBJECTIVE:     Seen and examined.  Feeling good.  Denies shortness of air.  No new issues        OBJECTIVE:  Vitals:    12/18/21 1938 12/18/21 2354 12/19/21 0519 12/19/21 0745   BP: 122/74 116/65  144/81   BP Location: Right arm Right arm  Right arm   Patient Position: Sitting Lying  Lying   Pulse: 59 60     Resp: 18 18 17   Temp:  97.6 °F (36.4 °C)  97.6 °F (36.4 °C)   TempSrc: Oral Oral  Oral   SpO2: 98% 98%  97%   Weight:   84.4 kg (186 lb 1.6 oz)    Height:               Body mass index is 25.24 kg/m².    Intake/Output Summary (Last 24 hours) at 12/19/2021 1055  Last data filed at 12/19/2021 0900  Gross per 24 hour   Intake 440 ml   Output 1475 ml   Net -1035 ml         Exam:  GEN:  No distress, appears stated age  EYES:   Anicteric sclera  ENT:    External ears/nose normal, MM are moist  NECK:  No adenopathy, JVP none  LUNGS: Normal chest on inspection; not labored  CV:  Normal S1S2, without murmur  ABD:  Non-tender, non-distended, no hepatosplenomegaly, +BS  EXT:  No edema; no cyanosis; clubbing    Scheduled meds:  amLODIPine, 5 mg, Oral, Daily  apixaban, 2.5 mg, Oral, Q12H  aspirin, 81 mg, Oral, Daily  atorvastatin, 80 mg, Oral, Nightly  buPROPion XL, 150 mg, Oral, Daily  carvedilol, 25 mg, Oral, BID With Meals  epoetin lane/lane-epbx, 10,000 Units, Subcutaneous, Weekly  ferrous sulfate, 325 mg, Oral, Daily With Breakfast & Dinner  folic acid, 1,000 mcg, Oral, Daily  insulin lispro, 0-9 Units, Subcutaneous, 4x Daily With Meals & Nightly  levothyroxine, 75 mcg, Oral, Q AM  polyethylene glycol, 17 g, Oral, Daily  senna-docusate sodium, 2 tablet, Oral, BID  sodium chloride, 10 mL, Intravenous, Q12H  tamsulosin, 0.4 mg, Oral, Nightly  vitamin B-12, 1,000 mcg, Oral, Daily  vitamin D, 50,000 Units, Oral, Q7 Days      IV meds:                      hold, 1  each        Data Review:    Results from last 7 days   Lab Units 12/19/21  0556 12/18/21  0502 12/17/21  0556 12/14/21  0421 12/13/21  1614   SODIUM mmol/L 137 138 140   < > 138   POTASSIUM mmol/L 4.7 4.5 5.0   < > 4.0   CHLORIDE mmol/L 104 105 108*   < > 107   CO2 mmol/L 21.8* 23.5 23.3   < > 23.7   BUN mg/dL 60* 57* 47*   < > 26*   CREATININE mg/dL 3.50* 3.62* 3.49*   < > 2.64*   CALCIUM mg/dL 8.5* 8.4* 8.2*   < > 9.0   BILIRUBIN mg/dL  --   --   --   --  0.2   ALK PHOS U/L  --   --   --   --  86   ALT (SGPT) U/L  --   --   --   --  8   AST (SGOT) U/L  --   --   --   --  13   GLUCOSE mg/dL 106* 81 102*   < > 190*    < > = values in this interval not displayed.       Estimated Creatinine Clearance: 24.8 mL/min (A) (by C-G formula based on SCr of 3.5 mg/dL (H)).    Results from last 7 days   Lab Units 12/15/21  0410   MAGNESIUM mg/dL 1.8   PHOSPHORUS mg/dL 4.4       Results from last 7 days   Lab Units 12/19/21  0556 12/18/21  0502 12/17/21  0556 12/16/21  0625 12/15/21  0410   WBC 10*3/mm3 5.08 6.48 5.91 5.32 5.68   HEMOGLOBIN g/dL 9.5* 9.4* 9.4* 9.6* 9.4*   PLATELETS 10*3/mm3 223 222 229 240 234                   ASSESSMENT:     Acute on chronic combined systolic and diastolic congestive heart failure (HCC)    Vitamin D deficiency    Type 2 diabetes mellitus with ophthalmic complication ([facility])    Acquired hypothyroidism    Hypertensive urgency    Pleural effusion    YAW (obstructive sleep apnea)    Coronary artery disease involving native coronary artery of native heart without angina pectoris    Chronically elevated troponin    Anemia, chronic renal failure, stage 3 (moderate) (HCC)    Essential hypertension    Patent foramen ovale    Pleural effusion, bilateral    Cardiomyopathy (HCC)    Lower abdominal pain    Diarrhea    CKD (chronic kidney disease) stage 4, GFR 15-29 ml/min ([facility])      -Acute kidney injury on chronic kidney disease stage IV: His kidney function has been fluctuating but most recently had been in  the range between 2 and 2.3 mg/dL.  Creatinine today is 3.6 mg/dL.  Patient appears to be euvolemic.  He is going to undergo thoracentesis.  The patient is noncompliant and we had lengthy discussion about importance of compliance.     -Proteinuria: Most likely due to diabetic nephropathy.  Awaiting  free serum kappa light chain  -Pleural effusion:  Had thoracentesis yesterday December 15  -Diabetes mellitus type 2: Noncompliant with the treatment  -Hypertension  -Acute on chronic congestive heart failure last ejection fraction 31-35% with fixed restrictive diastolic congestive heart failure  -Anemia of chronic illness: Last hemoglobin 9.4.  Last iron profile appears to be adequate  -Coronary artery disease  -History of stroke  -History of obstructive sleep apnea  -History of noncompliance      PLAN:    Kidney function plateaued with creatinine 3.5.  Expect kidney function to improve continue to improve.  If creatinine is better tomorrow patient will be cleared to be discharged home to follow-up with nephrology as an outpatient  Patient is likely heading toward dialysis in the near future  Discussed with the patient importance of compliance  Surveillance labs    Vitor Parmar MD  12/19/2021    10:55 EST

## 2021-12-20 LAB
ANION GAP SERPL CALCULATED.3IONS-SCNC: 8.4 MMOL/L (ref 5–15)
BACTERIA FLD CULT: NORMAL
BASOPHILS # BLD AUTO: 0.02 10*3/MM3 (ref 0–0.2)
BASOPHILS NFR BLD AUTO: 0.4 % (ref 0–1.5)
BUN SERPL-MCNC: 64 MG/DL (ref 8–23)
BUN/CREAT SERPL: 17.2 (ref 7–25)
CALCIUM SPEC-SCNC: 8.2 MG/DL (ref 8.6–10.5)
CHLORIDE SERPL-SCNC: 105 MMOL/L (ref 98–107)
CO2 SERPL-SCNC: 21.6 MMOL/L (ref 22–29)
CREAT SERPL-MCNC: 3.72 MG/DL (ref 0.76–1.27)
DEPRECATED RDW RBC AUTO: 40.5 FL (ref 37–54)
EOSINOPHIL # BLD AUTO: 0.2 10*3/MM3 (ref 0–0.4)
EOSINOPHIL NFR BLD AUTO: 3.6 % (ref 0.3–6.2)
ERYTHROCYTE [DISTWIDTH] IN BLOOD BY AUTOMATED COUNT: 13.5 % (ref 12.3–15.4)
GFR SERPL CREATININE-BSD FRML MDRD: 16 ML/MIN/1.73
GLUCOSE BLDC GLUCOMTR-MCNC: 115 MG/DL (ref 70–130)
GLUCOSE BLDC GLUCOMTR-MCNC: 133 MG/DL (ref 70–130)
GLUCOSE BLDC GLUCOMTR-MCNC: 173 MG/DL (ref 70–130)
GLUCOSE BLDC GLUCOMTR-MCNC: 177 MG/DL (ref 70–130)
GLUCOSE SERPL-MCNC: 172 MG/DL (ref 65–99)
GRAM STN SPEC: NORMAL
HCT VFR BLD AUTO: 30.6 % (ref 37.5–51)
HGB BLD-MCNC: 9.7 G/DL (ref 13–17.7)
IMM GRANULOCYTES # BLD AUTO: 0.02 10*3/MM3 (ref 0–0.05)
IMM GRANULOCYTES NFR BLD AUTO: 0.4 % (ref 0–0.5)
LYMPHOCYTES # BLD AUTO: 0.94 10*3/MM3 (ref 0.7–3.1)
LYMPHOCYTES NFR BLD AUTO: 16.9 % (ref 19.6–45.3)
MCH RBC QN AUTO: 26.4 PG (ref 26.6–33)
MCHC RBC AUTO-ENTMCNC: 31.7 G/DL (ref 31.5–35.7)
MCV RBC AUTO: 83.2 FL (ref 79–97)
MONOCYTES # BLD AUTO: 0.6 10*3/MM3 (ref 0.1–0.9)
MONOCYTES NFR BLD AUTO: 10.8 % (ref 5–12)
NEUTROPHILS NFR BLD AUTO: 3.78 10*3/MM3 (ref 1.7–7)
NEUTROPHILS NFR BLD AUTO: 67.9 % (ref 42.7–76)
NRBC BLD AUTO-RTO: 0 /100 WBC (ref 0–0.2)
PLATELET # BLD AUTO: 226 10*3/MM3 (ref 140–450)
PMV BLD AUTO: 10.6 FL (ref 6–12)
POTASSIUM SERPL-SCNC: 5.5 MMOL/L (ref 3.5–5.2)
RBC # BLD AUTO: 3.68 10*6/MM3 (ref 4.14–5.8)
SODIUM SERPL-SCNC: 135 MMOL/L (ref 136–145)
WBC NRBC COR # BLD: 5.56 10*3/MM3 (ref 3.4–10.8)

## 2021-12-20 PROCEDURE — 63710000001 INSULIN LISPRO (HUMAN) PER 5 UNITS: Performed by: NURSE PRACTITIONER

## 2021-12-20 PROCEDURE — 80048 BASIC METABOLIC PNL TOTAL CA: CPT | Performed by: STUDENT IN AN ORGANIZED HEALTH CARE EDUCATION/TRAINING PROGRAM

## 2021-12-20 PROCEDURE — 85025 COMPLETE CBC W/AUTO DIFF WBC: CPT | Performed by: STUDENT IN AN ORGANIZED HEALTH CARE EDUCATION/TRAINING PROGRAM

## 2021-12-20 PROCEDURE — 82962 GLUCOSE BLOOD TEST: CPT

## 2021-12-20 PROCEDURE — 86335 IMMUNFIX E-PHORSIS/URINE/CSF: CPT | Performed by: INTERNAL MEDICINE

## 2021-12-20 RX ORDER — SODIUM CHLORIDE 450 MG/100ML
100 INJECTION, SOLUTION INTRAVENOUS ONCE
Status: COMPLETED | OUTPATIENT
Start: 2021-12-20 | End: 2021-12-20

## 2021-12-20 RX ADMIN — CARVEDILOL 25 MG: 25 TABLET, FILM COATED ORAL at 18:40

## 2021-12-20 RX ADMIN — ATORVASTATIN CALCIUM 80 MG: 80 TABLET, FILM COATED ORAL at 20:16

## 2021-12-20 RX ADMIN — AMLODIPINE BESYLATE 5 MG: 5 TABLET ORAL at 09:36

## 2021-12-20 RX ADMIN — TAMSULOSIN HYDROCHLORIDE 0.4 MG: 0.4 CAPSULE ORAL at 20:16

## 2021-12-20 RX ADMIN — FERROUS SULFATE TAB 325 MG (65 MG ELEMENTAL FE) 325 MG: 325 (65 FE) TAB at 09:36

## 2021-12-20 RX ADMIN — SODIUM CHLORIDE 100 ML/HR: 4.5 INJECTION, SOLUTION INTRAVENOUS at 15:05

## 2021-12-20 RX ADMIN — BUPROPION HYDROCHLORIDE 150 MG: 150 TABLET, EXTENDED RELEASE ORAL at 09:36

## 2021-12-20 RX ADMIN — FERROUS SULFATE TAB 325 MG (65 MG ELEMENTAL FE) 325 MG: 325 (65 FE) TAB at 18:40

## 2021-12-20 RX ADMIN — INSULIN LISPRO 2 UNITS: 100 INJECTION, SOLUTION INTRAVENOUS; SUBCUTANEOUS at 13:17

## 2021-12-20 RX ADMIN — SODIUM ZIRCONIUM CYCLOSILICATE 10 G: 10 POWDER, FOR SUSPENSION ORAL at 15:05

## 2021-12-20 RX ADMIN — ASPIRIN 81 MG: 81 TABLET, COATED ORAL at 09:36

## 2021-12-20 RX ADMIN — SODIUM CHLORIDE, PRESERVATIVE FREE 10 ML: 5 INJECTION INTRAVENOUS at 09:38

## 2021-12-20 RX ADMIN — INSULIN LISPRO 2 UNITS: 100 INJECTION, SOLUTION INTRAVENOUS; SUBCUTANEOUS at 20:59

## 2021-12-20 RX ADMIN — FOLIC ACID 1000 MCG: 1 TABLET ORAL at 09:36

## 2021-12-20 RX ADMIN — Medication 1000 MCG: at 09:36

## 2021-12-20 RX ADMIN — SODIUM CHLORIDE, PRESERVATIVE FREE 10 ML: 5 INJECTION INTRAVENOUS at 20:16

## 2021-12-20 RX ADMIN — CARVEDILOL 25 MG: 25 TABLET, FILM COATED ORAL at 09:36

## 2021-12-20 RX ADMIN — APIXABAN 2.5 MG: 5 TABLET, FILM COATED ORAL at 20:16

## 2021-12-20 RX ADMIN — APIXABAN 2.5 MG: 5 TABLET, FILM COATED ORAL at 09:36

## 2021-12-20 RX ADMIN — LEVOTHYROXINE SODIUM 75 MCG: 0.07 TABLET ORAL at 06:14

## 2021-12-20 NOTE — PROGRESS NOTES
NEPHROLOGY PROGRESS NOTE    PATIENT IDENTIFICATION:   Name:  Carlito Mo      MRN:  [id number]     66 y.o.  male             Reason for visit: SYLVAIN    SUBJECTIVE:     Seen and examined.  Feeling good.  Denies shortness of air.  No new issues        OBJECTIVE:  Vitals:    12/19/21 2309 12/20/21 0519 12/20/21 0720 12/20/21 1237   BP: 126/73  135/75 118/70   BP Location: Right arm  Right arm Right arm   Patient Position: Lying  Lying Lying   Pulse: 63  61 64   Resp: 17  18 18   Temp: 98.1 °F (36.7 °C)  97.6 °F (36.4 °C) 97.6 °F (36.4 °C)   TempSrc: Oral  Oral Oral   SpO2: 93%  91% 94%   Weight:  84.4 kg (186 lb)     Height:               Body mass index is 25.23 kg/m².    Intake/Output Summary (Last 24 hours) at 12/20/2021 1321  Last data filed at 12/20/2021 1237  Gross per 24 hour   Intake 220 ml   Output 551 ml   Net -331 ml         Exam:  GEN:  No distress, appears stated age  EYES:   Anicteric sclera  ENT:    External ears/nose normal, MM are moist  NECK:  No adenopathy, JVP none  LUNGS: Normal chest on inspection; not labored  CV:  Normal S1S2, without murmur  ABD:  Non-tender, non-distended, no hepatosplenomegaly, +BS  EXT:  No edema; no cyanosis; clubbing    Scheduled meds:  amLODIPine, 5 mg, Oral, Daily  apixaban, 2.5 mg, Oral, Q12H  aspirin, 81 mg, Oral, Daily  atorvastatin, 80 mg, Oral, Nightly  buPROPion XL, 150 mg, Oral, Daily  carvedilol, 25 mg, Oral, BID With Meals  epoetin lane/lane-epbx, 10,000 Units, Subcutaneous, Weekly  ferrous sulfate, 325 mg, Oral, Daily With Breakfast & Dinner  folic acid, 1,000 mcg, Oral, Daily  insulin lispro, 0-9 Units, Subcutaneous, 4x Daily With Meals & Nightly  levothyroxine, 75 mcg, Oral, Q AM  polyethylene glycol, 17 g, Oral, Daily  senna-docusate sodium, 2 tablet, Oral, BID  sodium chloride, 10 mL, Intravenous, Q12H  tamsulosin, 0.4 mg, Oral, Nightly  vitamin B-12, 1,000 mcg, Oral, Daily  vitamin D, 50,000 Units, Oral, Q7 Days      IV meds:                      hold,  1 each        Data Review:    Results from last 7 days   Lab Units 12/20/21  1100 12/19/21  0556 12/18/21  0502 12/14/21  0421 12/13/21  1614   SODIUM mmol/L 135* 137 138   < > 138   POTASSIUM mmol/L 5.5* 4.7 4.5   < > 4.0   CHLORIDE mmol/L 105 104 105   < > 107   CO2 mmol/L 21.6* 21.8* 23.5   < > 23.7   BUN mg/dL 64* 60* 57*   < > 26*   CREATININE mg/dL 3.72* 3.50* 3.62*   < > 2.64*   CALCIUM mg/dL 8.2* 8.5* 8.4*   < > 9.0   BILIRUBIN mg/dL  --   --   --   --  0.2   ALK PHOS U/L  --   --   --   --  86   ALT (SGPT) U/L  --   --   --   --  8   AST (SGOT) U/L  --   --   --   --  13   GLUCOSE mg/dL 172* 106* 81   < > 190*    < > = values in this interval not displayed.       Estimated Creatinine Clearance: 23.3 mL/min (A) (by C-G formula based on SCr of 3.72 mg/dL (H)).    Results from last 7 days   Lab Units 12/15/21  0410   MAGNESIUM mg/dL 1.8   PHOSPHORUS mg/dL 4.4       Results from last 7 days   Lab Units 12/20/21  1100 12/19/21  0556 12/18/21  0502 12/17/21  0556 12/16/21  0625   WBC 10*3/mm3 5.56 5.08 6.48 5.91 5.32   HEMOGLOBIN g/dL 9.7* 9.5* 9.4* 9.4* 9.6*   PLATELETS 10*3/mm3 226 223 222 229 240                   ASSESSMENT:     Acute on chronic combined systolic and diastolic congestive heart failure (HCC)    Vitamin D deficiency    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)    Acquired hypothyroidism    Hypertensive urgency    Pleural effusion    YAW (obstructive sleep apnea)    Coronary artery disease involving native coronary artery of native heart without angina pectoris    Chronically elevated troponin    Anemia, chronic renal failure, stage 3 (moderate) (HCC)    Essential hypertension    Patent foramen ovale    Pleural effusion, bilateral    Cardiomyopathy (HCC)    Lower abdominal pain    Diarrhea    CKD (chronic kidney disease) stage 4, GFR 15-29 ml/min ([facility])      -Acute kidney injury on chronic kidney disease stage IV: His kidney function has been fluctuating but most recently had  been in the range between 2 and 2.3 mg/dL.  Creatinine today is 3.6 mg/dL.  Patient appears to be euvolemic.  He is going to undergo thoracentesis.  The patient is noncompliant and we had lengthy discussion about importance of compliance.     -Proteinuria: Most likely due to diabetic nephropathy.  Awaiting  free serum kappa light chain  -Pleural effusion:  Had thoracentesis yesterday December 15  -Diabetes mellitus type 2: Noncompliant with the treatment  -Hypertension  -Acute on chronic congestive heart failure last ejection fraction 31-35% with fixed restrictive diastolic congestive heart failure  -Anemia of chronic illness: Last hemoglobin 9.4.  Last iron profile appears to be adequate  -Coronary artery disease  -History of stroke  -History of obstructive sleep apnea  -History of noncompliance      PLAN:  Kidney function slightly up to 3.7 mg/dL with BUN 64.  Potassium at 5.5.. Still expect kidney function to improve continue to improve.   Continue to hold on discharge   Start patient on Lokelma   patient is likely heading toward dialysis in the near future  Discussed with the patient importance of compliance  Surveillance labs    Vitor Parmar MD  12/20/2021    13:21 EST

## 2021-12-20 NOTE — PLAN OF CARE
Goal Outcome Evaluation:              Outcome Summary: Pt was agreeable to therapy. He is ambulating with SB-CGA with STC. He denies interest in using a rolling walker. He states he feels comfortable with the stairs after practicing last session. He hope to d/c home today and is safe to do so. PT will sign off.  Patient was intermittently wearing a face mask during this therapy encounter. Therapist used appropriate personal protective equipment including mask and gloves.  Mask used was standard procedure mask. Appropriate PPE was worn during the entire therapy session. Hand hygiene was completed before and after therapy session. Patient is not in enhanced droplet precautions.

## 2021-12-20 NOTE — PLAN OF CARE
Goal Outcome Evaluation:              Outcome Summary: VSS no new complaints up in room. Maintain safety and continue to monitor

## 2021-12-20 NOTE — PROGRESS NOTES
Name: Carlito Mo ADMIT: 2021   : 1955  PCP: Florencia Caballero MD    MRN: [id number] LOS: 6 days   AGE/SEX: 66 y.o. male  ROOM: [facility]/     Subjective   Subjective   Patient resting in bed.  He is complaining of feeling tired today but otherwise has no specific complaints.  Kidney function modestly worse today.  Potassium is 5.5.  He has been started on Lokelma by nephrology.    Review of Systems   Constitutional: Positive for fatigue. Negative for fever.   HENT: Negative for sinus pressure and sneezing.    Eyes: Negative for pain and redness.   Respiratory: Negative for cough and shortness of breath.    Cardiovascular: Positive for leg swelling. Negative for chest pain.   Gastrointestinal: Negative for abdominal pain, nausea and vomiting.   Skin: Negative for rash and wound.   Neurological: Negative for seizures and headaches.       Objective   Objective   Vital Signs  Temp:  [97.6 °F (36.4 °C)-98.1 °F (36.7 °C)] 97.6 °F (36.4 °C)  Heart Rate:  [59-64] 64  Resp:  [17-18] 18  BP: (104-135)/(60-75) 118/70  SpO2:  [91 %-94 %] 94 %  on   ;   Device (Oxygen Therapy): room air  Body mass index is 25.23 kg/m².  Physical Exam  Constitutional:       General: He is not in acute distress.     Appearance: He is not diaphoretic.      Comments: Chronically ill-appearing   HENT:      Mouth/Throat:      Mouth: Mucous membranes are moist.   Eyes:      General: No scleral icterus.  Cardiovascular:      Rate and Rhythm: Normal rate and regular rhythm.      Heart sounds: No murmur heard.  No gallop.    Pulmonary:      Effort: Pulmonary effort is normal. No respiratory distress.      Breath sounds: No wheezing or rhonchi.   Abdominal:      General: There is no distension.      Palpations: Abdomen is soft.      Tenderness: There is no abdominal tenderness. There is no guarding.   Musculoskeletal:         General: Swelling (1+ to ankles (improving)) present.   Skin:     General: Skin is warm and dry.   Neurological:       Mental Status: He is alert.         Results Review     I reviewed the patient's new clinical results.  Results from last 7 days   Lab Units 12/20/21  1100 12/19/21  0556 12/18/21  0502 12/17/21  0556   WBC 10*3/mm3 5.56 5.08 6.48 5.91   HEMOGLOBIN g/dL 9.7* 9.5* 9.4* 9.4*   PLATELETS 10*3/mm3 226 223 222 229     Results from last 7 days   Lab Units 12/20/21  1100 12/19/21  0556 12/18/21  0502 12/17/21  0556   SODIUM mmol/L 135* 137 138 140   POTASSIUM mmol/L 5.5* 4.7 4.5 5.0   CHLORIDE mmol/L 105 104 105 108*   CO2 mmol/L 21.6* 21.8* 23.5 23.3   BUN mg/dL 64* 60* 57* 47*   CREATININE mg/dL 3.72* 3.50* 3.62* 3.49*   GLUCOSE mg/dL 172* 106* 81 102*   Estimated Creatinine Clearance: 23.3 mL/min (A) (by C-G formula based on SCr of 3.72 mg/dL (H)).  Results from last 7 days   Lab Units 12/13/21  1614   ALBUMIN g/dL 3.10*   BILIRUBIN mg/dL 0.2   ALK PHOS U/L 86   AST (SGOT) U/L 13   ALT (SGPT) U/L 8     Results from last 7 days   Lab Units 12/20/21  1100 12/19/21  0556 12/18/21  0502 12/17/21  0556 12/16/21  0625 12/15/21  0410 12/14/21  0421 12/13/21  1614   CALCIUM mg/dL 8.2* 8.5* 8.4* 8.2*   < > 8.1*   < > 9.0   ALBUMIN g/dL  --   --   --   --   --   --   --  3.10*   MAGNESIUM mg/dL  --   --   --   --   --  1.8  --   --    PHOSPHORUS mg/dL  --   --   --   --   --  4.4  --   --     < > = values in this interval not displayed.       COVID19   Date Value Ref Range Status   12/13/2021 Not Detected Not Detected - Ref. Range Final   05/20/2021 Not Detected Not Detected - Ref. Range Final     Glucose   Date/Time Value Ref Range Status   12/20/2021 1055 173 (H) 70 - 130 mg/dL Final     Comment:     Meter: [id number] : [id number] Leticia Ibanez TOMMY   12/20/2021 0613 115 70 - 130 mg/dL Final     Comment:     Meter: [id number] : [id number] Sonny Naomi NA   12/19/2021 2026 143 (H) 70 - 130 mg/dL Final     Comment:     Meter: [id number] : [id number] Sonny Naomi NA   12/19/2021 1643 169 (H) 70 - 130 mg/dL Final      Comment:     Meter: [id number] : [id number] Xavier Hardy NA   12/19/2021 1109 157 (H) 70 - 130 mg/dL Final     Comment:     Meter: [id number] : [id number] Xavier Hardy NA   12/19/2021 0631 110 70 - 130 mg/dL Final     Comment:     Meter: [id number] : [id number] Rene Murray CNA   12/18/2021 2055 213 (H) 70 - 130 mg/dL Final     Comment:     Meter: [id number] : [id number] Rene Murray CNA       XR Chest 2 View  Narrative: TWO VIEW CHEST X-RAY     HISTORY: Postop thoracentesis.     TECHNIQUE: A total of three x-ray images of the chest are provided and  are correlated with images from a sonographically guided right  thoracentesis of 2 L performed earlier today and chest x-ray 12/13/2021.     FINDINGS: There are sternal wires. There has been significant  improvement in the appearance of the right hemithorax. The lateral view  shows persistent blunting of the costophrenic angle posteriorly but the  right pleural effusion is substantially decreased in size. There is also  a small residual left pleural effusion. Central pulmonary vasculature is  engorged and there is mild interstitial edema. There is no pneumothorax.     Impression: No pneumothorax following right thoracentesis. There is  persistent pulmonary vascular engorgement with mild interstitial edema.     This report was finalized on 12/16/2021 12:50 PM by Dr. Klever Guerrero M.D.       I reviewed the patient's daily medications.  Scheduled Medications  amLODIPine, 5 mg, Oral, Daily  apixaban, 2.5 mg, Oral, Q12H  aspirin, 81 mg, Oral, Daily  atorvastatin, 80 mg, Oral, Nightly  buPROPion XL, 150 mg, Oral, Daily  carvedilol, 25 mg, Oral, BID With Meals  epoetin lane/lane-epbx, 10,000 Units, Subcutaneous, Weekly  ferrous sulfate, 325 mg, Oral, Daily With Breakfast & Dinner  folic acid, 1,000 mcg, Oral, Daily  insulin lispro, 0-9 Units, Subcutaneous, 4x Daily With Meals & Nightly  levothyroxine, 75 mcg, Oral, Q AM  polyethylene glycol, 17  g, Oral, Daily  senna-docusate sodium, 2 tablet, Oral, BID  sodium chloride, 100 mL/hr, Intravenous, Once  sodium chloride, 10 mL, Intravenous, Q12H  sodium zirconium cyclosilicate, 10 g, Oral, Daily  tamsulosin, 0.4 mg, Oral, Nightly  vitamin B-12, 1,000 mcg, Oral, Daily  vitamin D, 50,000 Units, Oral, Q7 Days    Infusions  hold, 1 each    Diet  Diet Regular; Cardiac, Consistent Carbohydrate       Assessment/Plan     Active Hospital Problems    Diagnosis  POA   • **Acute on chronic combined systolic and diastolic congestive heart failure (HCC) [I50.43]  Yes   • CKD (chronic kidney disease) stage 4, GFR 15-29 ml/min ([facility]) [N18.4]  Yes   • Diarrhea [R19.7]  Yes   • Lower abdominal pain [R10.30]  Yes   • Cardiomyopathy ([facility]) [I42.9]  Yes   • Patent foramen ovale [Q21.1]  Not Applicable   • Pleural effusion, bilateral [J90]  Yes   • Essential hypertension [I10]  Yes   • Anemia, chronic renal failure, stage 3 (moderate) ([facility]) [N18.30, D63.1]  Yes   • Chronically elevated troponin [R77.8]  Yes   • Coronary artery disease involving native coronary artery of native heart without angina pectoris [I25.10]  Yes   • YAW (obstructive sleep apnea) [G47.33]  Yes   • Pleural effusion [J90]  Yes   • Hypertensive urgency [I16.0]  Yes   • Acquired hypothyroidism [E03.9]  Yes   • Type 2 diabetes mellitus with ophthalmic complication ([facility]) [E11.39]  Yes   • Vitamin D deficiency [E55.9]  Yes      Resolved Hospital Problems   No resolved problems to display.       66-year-old male with a history of diabetes, coronary artery disease status post CABG, CKD, chronic systolic heart failure, and CVA in May 2021 who presented with abdominal pain, nausea and vomiting.  He is admitted for acute on chronic combined systolic and diastolic heart failure and bilateral pleural effusions, likely due to medication noncompliance.    Acute on chronic systolic and diastolic heart failure  - Torsemide 60 mg daily- held.  Continue beta-blocker; ACE  inhibitor- held.  Cardiology following  - nephrology following to assist with diuresis in the setting of CKD.  His lower extremity edema is improving daily  -Creatinine modestly worse, lisinopril and torsemide has been held by nephrology     Bilateral pleural effusions  -Status post right thoracentesis, 2 L removed on 12/15.  Pulmonology recommends hold off on left sided thoracentesis unless his respiratory status worsens.    -Diuresis-held     History of stroke, May 2021  -On apixaban     Type 2 diabetes  -Stopped basal insulin due to some hypoglycemia, continue sliding scale insulin     CKD stage IV with mild hyperkalemia  -Baseline creatinine appears to be 2-2.3.    -Creatinine worsening again today, diuresis held by nephrology  - Cr 3.7, potassium is elevated to 5.5; started on Lokelma by nephrology     Hypertensive urgency- resolved  -Resume home amlodipine, Coreg,     · Eliquis (home med) for DVT prophylaxis.   · Full code.  · Discussed with patient and nursing staff.  And family  · Anticipate discharge home when cleared by consultants.      Odilia Ayala MD  [facility]  12/20/21  14:52 EST    Patient was placed in face mask on first look.  I wore protective equipment throughout this patient encounter including a face mask, gloves and protective eyewear.  Hand hygiene was performed protective equipment and after removal when leaving the room.

## 2021-12-21 ENCOUNTER — APPOINTMENT (OUTPATIENT)
Dept: ULTRASOUND IMAGING | Facility: HOSPITAL | Age: 66
End: 2021-12-21

## 2021-12-21 LAB
ALBUMIN SERPL-MCNC: 2.7 G/DL (ref 3.5–5.2)
ANION GAP SERPL CALCULATED.3IONS-SCNC: 9.8 MMOL/L (ref 5–15)
BUN SERPL-MCNC: 64 MG/DL (ref 8–23)
BUN/CREAT SERPL: 16 (ref 7–25)
CALCIUM SPEC-SCNC: 8.4 MG/DL (ref 8.6–10.5)
CHLORIDE SERPL-SCNC: 105 MMOL/L (ref 98–107)
CO2 SERPL-SCNC: 22.2 MMOL/L (ref 22–29)
CREAT SERPL-MCNC: 4 MG/DL (ref 0.76–1.27)
GFR SERPL CREATININE-BSD FRML MDRD: 15 ML/MIN/1.73
GLUCOSE BLDC GLUCOMTR-MCNC: 105 MG/DL (ref 70–130)
GLUCOSE BLDC GLUCOMTR-MCNC: 146 MG/DL (ref 70–130)
GLUCOSE BLDC GLUCOMTR-MCNC: 366 MG/DL (ref 70–130)
GLUCOSE BLDC GLUCOMTR-MCNC: 72 MG/DL (ref 70–130)
GLUCOSE SERPL-MCNC: 149 MG/DL (ref 65–99)
PHOSPHATE SERPL-MCNC: 4.8 MG/DL (ref 2.5–4.5)
POTASSIUM SERPL-SCNC: 4.4 MMOL/L (ref 3.5–5.2)
SODIUM SERPL-SCNC: 137 MMOL/L (ref 136–145)

## 2021-12-21 PROCEDURE — 82962 GLUCOSE BLOOD TEST: CPT

## 2021-12-21 PROCEDURE — 76775 US EXAM ABDO BACK WALL LIM: CPT

## 2021-12-21 PROCEDURE — 63710000001 INSULIN LISPRO (HUMAN) PER 5 UNITS: Performed by: NURSE PRACTITIONER

## 2021-12-21 PROCEDURE — 80069 RENAL FUNCTION PANEL: CPT | Performed by: INTERNAL MEDICINE

## 2021-12-21 RX ORDER — TORSEMIDE 20 MG/1
60 TABLET ORAL DAILY
Status: DISCONTINUED | OUTPATIENT
Start: 2021-12-21 | End: 2021-12-22 | Stop reason: HOSPADM

## 2021-12-21 RX ORDER — CARVEDILOL 12.5 MG/1
12.5 TABLET ORAL 2 TIMES DAILY WITH MEALS
Status: DISCONTINUED | OUTPATIENT
Start: 2021-12-21 | End: 2021-12-22 | Stop reason: HOSPADM

## 2021-12-21 RX ADMIN — AMLODIPINE BESYLATE 5 MG: 5 TABLET ORAL at 08:29

## 2021-12-21 RX ADMIN — BUPROPION HYDROCHLORIDE 150 MG: 150 TABLET, EXTENDED RELEASE ORAL at 08:29

## 2021-12-21 RX ADMIN — TORSEMIDE 60 MG: 20 TABLET ORAL at 16:38

## 2021-12-21 RX ADMIN — SODIUM CHLORIDE, PRESERVATIVE FREE 10 ML: 5 INJECTION INTRAVENOUS at 08:30

## 2021-12-21 RX ADMIN — ASPIRIN 81 MG: 81 TABLET, COATED ORAL at 08:30

## 2021-12-21 RX ADMIN — FOLIC ACID 1000 MCG: 1 TABLET ORAL at 08:29

## 2021-12-21 RX ADMIN — ATORVASTATIN CALCIUM 80 MG: 80 TABLET, FILM COATED ORAL at 21:06

## 2021-12-21 RX ADMIN — CARVEDILOL 12.5 MG: 12.5 TABLET, FILM COATED ORAL at 18:04

## 2021-12-21 RX ADMIN — SODIUM ZIRCONIUM CYCLOSILICATE 10 G: 10 POWDER, FOR SUSPENSION ORAL at 08:30

## 2021-12-21 RX ADMIN — FERROUS SULFATE TAB 325 MG (65 MG ELEMENTAL FE) 325 MG: 325 (65 FE) TAB at 18:04

## 2021-12-21 RX ADMIN — FERROUS SULFATE TAB 325 MG (65 MG ELEMENTAL FE) 325 MG: 325 (65 FE) TAB at 08:30

## 2021-12-21 RX ADMIN — LEVOTHYROXINE SODIUM 75 MCG: 0.07 TABLET ORAL at 06:25

## 2021-12-21 RX ADMIN — APIXABAN 2.5 MG: 5 TABLET, FILM COATED ORAL at 21:06

## 2021-12-21 RX ADMIN — APIXABAN 2.5 MG: 5 TABLET, FILM COATED ORAL at 08:29

## 2021-12-21 RX ADMIN — DOCUSATE SODIUM 50MG AND SENNOSIDES 8.6MG 2 TABLET: 8.6; 5 TABLET, FILM COATED ORAL at 21:06

## 2021-12-21 RX ADMIN — CARVEDILOL 25 MG: 25 TABLET, FILM COATED ORAL at 08:30

## 2021-12-21 RX ADMIN — SODIUM CHLORIDE, PRESERVATIVE FREE 10 ML: 5 INJECTION INTRAVENOUS at 21:06

## 2021-12-21 RX ADMIN — INSULIN LISPRO 8 UNITS: 100 INJECTION, SOLUTION INTRAVENOUS; SUBCUTANEOUS at 11:45

## 2021-12-21 RX ADMIN — Medication 1000 MCG: at 08:29

## 2021-12-21 RX ADMIN — TAMSULOSIN HYDROCHLORIDE 0.4 MG: 0.4 CAPSULE ORAL at 21:06

## 2021-12-21 NOTE — PLAN OF CARE
Goal Outcome Evaluation:  Plan of Care Reviewed With: patient           Outcome Summary: pt had no c/o pain or discomfort. pt up in chair most of shift. 0.45% NS bolus and lokelma given today. VSS. safety maintained. CTM

## 2021-12-21 NOTE — CASE MANAGEMENT/SOCIAL WORK
Continued Stay Note  [facility]     Patient Name: Carlito Mo  MRN: [id number]  Today's Date: 12/21/2021    Admit Date: 12/13/2021     Discharge Plan     Row Name 12/21/21 1608       Plan    Plan Home with [place] home health    Patient/Family in Agreement with Plan yes    Plan Comments CCP met with patient at bedside to discuss discharge planning. Noted PT/OT have signed off. Patient remains agreeable to home health for nursing visits. CCP discussed with patient that he may need dialysis and he states that he drives some or that his wife or son could transport him. CCP following clinical course to assist with discharge planning. serenity martinez rn/ccp               Discharge Codes    No documentation.               Expected Discharge Date and Time     Expected Discharge Date Expected Discharge Time    Dec 22, 2021             Amanda Martinez

## 2021-12-21 NOTE — PROGRESS NOTES
[facility] Progress Note      Patient Name: Carlito Mo  : 1955  MRN: [id number]  Primary Care Physician:  Florencia Caballero MD  Date of admission: 2021    Subjective     Interval History:   Follow Up SYLVAIN on CKD IV.  Tired. Not sleeping because bed too short. Eating, but does not like the food. No nausea.  Bowels moving.  Occas frequency of urination.  Not soa.  Usual weight 180-185.     Review of Systems:   As noted above    Objective     Vitals:   Temp:  [97.2 °F (36.2 °C)-97.8 °F (36.6 °C)] 97.3 °F (36.3 °C)  Heart Rate:  [58-63] 63  Resp:  [18] 18  BP: (103-119)/(50-71) 103/50    Intake/Output Summary (Last 24 hours) at 2021 1546  Last data filed at 2021 1320  Gross per 24 hour   Intake 1080 ml   Output 400 ml   Net 680 ml       Physical Exam:    General Appearance: alert, oriented x 3, no acute distress . Sitting up in chair.  Chronically ill .  Skin: warm and dry  HEENT: oral mucosa normal, nonicteric sclera  Neck: supple, no JVD  Lungs: Clear to auscultation.  Heart: RRR, normal S1 and S2  Abdomen: soft, nontender, non-distended. + bs  Extremities:1+ lower ext edema.   Neuro: normal speech and mental status     Scheduled Meds:     apixaban, 2.5 mg, Oral, Q12H  aspirin, 81 mg, Oral, Daily  atorvastatin, 80 mg, Oral, Nightly  buPROPion XL, 150 mg, Oral, Daily  carvedilol, 12.5 mg, Oral, BID With Meals  epoetin lane/lane-epbx, 10,000 Units, Subcutaneous, Weekly  ferrous sulfate, 325 mg, Oral, Daily With Breakfast & Dinner  folic acid, 1,000 mcg, Oral, Daily  insulin lispro, 0-9 Units, Subcutaneous, 4x Daily With Meals & Nightly  levothyroxine, 75 mcg, Oral, Q AM  polyethylene glycol, 17 g, Oral, Daily  senna-docusate sodium, 2 tablet, Oral, BID  sodium chloride, 10 mL, Intravenous, Q12H  sodium zirconium cyclosilicate, 10 g, Oral, Daily  tamsulosin, 0.4 mg, Oral, Nightly  torsemide, 60 mg, Oral, Daily  vitamin B-12, 1,000 mcg, Oral, Daily  vitamin D,  50,000 Units, Oral, Q7 Days      IV Meds:   hold, 1 each        Results Reviewed:   I have personally reviewed the results from the time of this admission to 12/21/2021 15:46 EST     Results from last 7 days   Lab Units 12/21/21  1310 12/20/21  1100 12/19/21  0556   SODIUM mmol/L 137 135* 137   POTASSIUM mmol/L 4.4 5.5* 4.7   CHLORIDE mmol/L 105 105 104   CO2 mmol/L 22.2 21.6* 21.8*   BUN mg/dL 64* 64* 60*   CREATININE mg/dL 4.00* 3.72* 3.50*   CALCIUM mg/dL 8.4* 8.2* 8.5*   GLUCOSE mg/dL 149* 172* 106*       Estimated Creatinine Clearance: 22 mL/min (A) (by C-G formula based on SCr of 4 mg/dL (H)).    Results from last 7 days   Lab Units 12/21/21  1310 12/15/21  0410   MAGNESIUM mg/dL  --  1.8   PHOSPHORUS mg/dL 4.8* 4.4             Results from last 7 days   Lab Units 12/20/21  1100 12/19/21  0556 12/18/21  0502 12/17/21  0556 12/16/21  0625   WBC 10*3/mm3 5.56 5.08 6.48 5.91 5.32   HEMOGLOBIN g/dL 9.7* 9.5* 9.4* 9.4* 9.6*   PLATELETS 10*3/mm3 226 223 222 229 240             Assessment / Plan     ASSESSMENT:  1. Abigail on CKD IV. Baseline crt 2.6.  Creatinine rising.  Mild volume excess by exam, weight up.  Discussed po fluid restriction.  Resume diuretic today.  BP too controlled. DC norvasc. No uremic signs or symptoms. Check Bladder scan, PVR, and renal US to make sure no retention .  2. Chronic combined systolic and fixed restrictive diastolic heart failure. Resume diuretic today.   3. Anemia CKD.  HAI.  4. DM2 uncontrolled.  Nephrotic proteinuria.   5. YAW  PLAN:  1. K restrict diet.  2. Torsemide 60 mg daily.  3. Bladder scan, PVR.  Renal US.    Zahida Hsu MD  12/21/21  15:46 EST    [facility]  [phone]

## 2021-12-21 NOTE — SIGNIFICANT NOTE
12/21/21 0806   OTHER   Discipline occupational therapist   Rehab Time/Intention   Session Not Performed other (see comments)  (OT spoke with pt, pt reporting no further acute OT needs at this time and no concerns with returning home at d/c. OT to s/o at this time. RN aware.)

## 2021-12-21 NOTE — PLAN OF CARE
Goal Outcome Evaluation:              Outcome Summary: VSS no new complaints up to bathroom with assist. Maintain safety and continue to monitor.

## 2021-12-22 ENCOUNTER — HOME HEALTH ADMISSION (OUTPATIENT)
Dept: HOME HEALTH SERVICES | Facility: HOME HEALTHCARE | Age: 66
End: 2021-12-22

## 2021-12-22 VITALS
TEMPERATURE: 97.2 F | RESPIRATION RATE: 18 BRPM | DIASTOLIC BLOOD PRESSURE: 87 MMHG | OXYGEN SATURATION: 92 % | WEIGHT: 188.2 LBS | SYSTOLIC BLOOD PRESSURE: 131 MMHG | BODY MASS INDEX: 25.49 KG/M2 | HEIGHT: 72 IN | HEART RATE: 71 BPM

## 2021-12-22 LAB
ALBUMIN SERPL-MCNC: 2.7 G/DL (ref 3.5–5.2)
ANION GAP SERPL CALCULATED.3IONS-SCNC: 9.7 MMOL/L (ref 5–15)
BUN SERPL-MCNC: 66 MG/DL (ref 8–23)
BUN/CREAT SERPL: 16.9 (ref 7–25)
CALCIUM SPEC-SCNC: 8.5 MG/DL (ref 8.6–10.5)
CHLORIDE SERPL-SCNC: 106 MMOL/L (ref 98–107)
CO2 SERPL-SCNC: 20.3 MMOL/L (ref 22–29)
CREAT SERPL-MCNC: 3.91 MG/DL (ref 0.76–1.27)
GFR SERPL CREATININE-BSD FRML MDRD: 15 ML/MIN/1.73
GLUCOSE BLDC GLUCOMTR-MCNC: 124 MG/DL (ref 70–130)
GLUCOSE BLDC GLUCOMTR-MCNC: 162 MG/DL (ref 70–130)
GLUCOSE SERPL-MCNC: 132 MG/DL (ref 65–99)
HBV SURFACE AG SERPL QL IA: NORMAL
INTERPRETATION UR IFE-IMP: NORMAL
PHOSPHATE SERPL-MCNC: 5.4 MG/DL (ref 2.5–4.5)
POTASSIUM SERPL-SCNC: 4.5 MMOL/L (ref 3.5–5.2)
SODIUM SERPL-SCNC: 136 MMOL/L (ref 136–145)

## 2021-12-22 PROCEDURE — 87340 HEPATITIS B SURFACE AG IA: CPT | Performed by: INTERNAL MEDICINE

## 2021-12-22 PROCEDURE — 63710000001 INSULIN LISPRO (HUMAN) PER 5 UNITS: Performed by: NURSE PRACTITIONER

## 2021-12-22 PROCEDURE — 82962 GLUCOSE BLOOD TEST: CPT

## 2021-12-22 PROCEDURE — 80069 RENAL FUNCTION PANEL: CPT | Performed by: INTERNAL MEDICINE

## 2021-12-22 RX ORDER — AMOXICILLIN 250 MG
2 CAPSULE ORAL 2 TIMES DAILY
Qty: 60 TABLET | Refills: 0 | Status: ON HOLD | OUTPATIENT
Start: 2021-12-22 | End: 2022-02-01

## 2021-12-22 RX ORDER — CARVEDILOL 25 MG/1
12.5 TABLET ORAL 2 TIMES DAILY WITH MEALS
Qty: 60 TABLET | Refills: 0
Start: 2021-12-22 | End: 2022-02-04 | Stop reason: HOSPADM

## 2021-12-22 RX ORDER — TORSEMIDE 20 MG/1
60 TABLET ORAL DAILY
Qty: 30 TABLET | Refills: 0 | Status: SHIPPED | OUTPATIENT
Start: 2021-12-23 | End: 2022-02-04 | Stop reason: HOSPADM

## 2021-12-22 RX ADMIN — FERROUS SULFATE TAB 325 MG (65 MG ELEMENTAL FE) 325 MG: 325 (65 FE) TAB at 08:30

## 2021-12-22 RX ADMIN — TORSEMIDE 60 MG: 20 TABLET ORAL at 08:30

## 2021-12-22 RX ADMIN — CARVEDILOL 12.5 MG: 12.5 TABLET, FILM COATED ORAL at 08:30

## 2021-12-22 RX ADMIN — BUPROPION HYDROCHLORIDE 150 MG: 150 TABLET, EXTENDED RELEASE ORAL at 08:30

## 2021-12-22 RX ADMIN — Medication 1000 MCG: at 08:31

## 2021-12-22 RX ADMIN — FOLIC ACID 1000 MCG: 1 TABLET ORAL at 08:30

## 2021-12-22 RX ADMIN — SODIUM ZIRCONIUM CYCLOSILICATE 10 G: 10 POWDER, FOR SUSPENSION ORAL at 08:30

## 2021-12-22 RX ADMIN — LEVOTHYROXINE SODIUM 75 MCG: 0.07 TABLET ORAL at 06:37

## 2021-12-22 RX ADMIN — INSULIN LISPRO 2 UNITS: 100 INJECTION, SOLUTION INTRAVENOUS; SUBCUTANEOUS at 11:44

## 2021-12-22 RX ADMIN — ASPIRIN 81 MG: 81 TABLET, COATED ORAL at 08:30

## 2021-12-22 RX ADMIN — APIXABAN 2.5 MG: 5 TABLET, FILM COATED ORAL at 08:30

## 2021-12-22 NOTE — DISCHARGE SUMMARY
"                                                                   PHYSICIAN DISCHARGE SUMMARY                                                                        [facility]    Patient Identification:  Name: Carlito Mo  Age: 66 y.o.  Sex: male  :  1955  MRN: [id number]  Primary Care Physician: Florencia Caballero MD    Admit date: 2021  Discharge date and time: 2021     Discharged Condition: good    Discharge Diagnoses:   Acute on chronic systolic and diastolic CHF:  Bilateral pleural effusion secondary to above:  Hypertensive urgency:  CKD stage IV/SYLVAIN:  Diabetes type 2:  Chronic anticoagulation with history of CVA:  Anemia: Multifactorial including chronic kidney disease, anemia chronic disease, iron deficiency.  Hyperkalemia:  Coronary disease:  Obstructive sleep apnea:  Patent carlson ovale:      Hospital Course:  66-year-old gentleman with multiple medical issues.  He presents to the emergency room after an episode of nausea vomiting.  There is not consistency and the history is rather abdominal pain was associated with this.  He was short of air on presentation with increased lower extremity edema and admits that he had stopped taking his diuretics secondary to urinating \"too much\".  Later he stated that he was out of diuretics.  Presenting blood pressure was 178/102.  Abdominal CT is unremarkable for any intra-abdominal acute changes.  There does not appear to be any ongoing abdominal complaints thereafter.  Chest x-ray showed pulmonary vascular gesturing with bilateral large pleural effusions, the right being the most prominent.  Patient was admitted and seen by both his nephrologist and cardiologist.  Pulmonology was also consulted on the case.  Large-volume thoracentesis on the right was performed due to shortness of air and this did improve his respiratory status.  Nephrology has been attempting diuresis on this gentleman.  Has been difficult due to worsening of the " renal function.  Renal ultrasound did not show any evidence of acute changes.  There is no evidence of any obstruction.  Creatinine is finally plateaued just a little bit under 4.  He has been cleared for discharge by all involved services.  It is very likely that within the next few months he is going to be requiring hemodialysis.      Consults:     Consults     Date and Time Order Name Status Description    12/14/2021 12:05 PM Inpatient Nephrology Consult Completed     12/14/2021 12:00 PM Inpatient Pulmonology Consult Completed     12/14/2021 12:45 AM Inpatient Cardiology Consult Completed     12/13/2021 10:57 PM [facility] (on-call MD unless specified) Details              Discharge Exam:  Afebrile vital signs stable.  Well-developed nourished male no apparent distress.  Lungs clear to auscultation but still dullness in the right base.  Heart regular rate and rhythm.  Extremities no clubbing cyanosis.  Trace pretibial edema bilaterally.  Alert oriented conversant cooperative.     Disposition:  Home    Patient Instructions:      Discharge Medications      New Medications      Instructions Start Date   sennosides-docusate 8.6-50 MG per tablet  Commonly known as: PERICOLACE   2 tablets, Oral, 2 Times Daily      sodium zirconium cyclosilicate 10 g pack  Commonly known as: LOKELMA   10 g, Oral, Daily   Start Date: December 23, 2021     torsemide 20 MG tablet  Commonly known as: DEMADEX   60 mg, Oral, Daily   Start Date: December 23, 2021        Changes to Medications      Instructions Start Date   apixaban 2.5 MG tablet tablet  Commonly known as: ELIQUIS  What changed:   · medication strength  · how much to take   2.5 mg, Oral, Every 12 Hours Scheduled      carvedilol 25 MG tablet  Commonly known as: COREG  What changed: how much to take   12.5 mg, Oral, 2 Times Daily With Meals         Continue These Medications      Instructions Start Date   acetaminophen 325 MG tablet  Commonly known as: TYLENOL   650 mg, Oral, Every 4  Hours PRN      Apidra 100 UNIT/ML injection  Generic drug: insulin glulisine   2 Units, Subcutaneous, 3 Times Daily Before Meals, Patient says he does not take consistently      aspirin 81 MG EC tablet   81 mg, Oral, Daily      atorvastatin 80 MG tablet  Commonly known as: LIPITOR   80 mg, Oral, Nightly      BASAGLAR KWIKPEN 100 UNIT/ML injection pen   4 Units, Subcutaneous, Nightly, Patient says he does not take consistently      buPROPion  MG 24 hr tablet  Commonly known as: WELLBUTRIN XL   TAKE ONE TABLET BY MOUTH EVERY MORNING      docusate sodium 100 MG capsule   100 mg, Oral      ferrous sulfate 325 (65 FE) MG tablet   325 mg, Oral, Daily With Breakfast & Dinner      folic acid 1 MG tablet  Commonly known as: FOLVITE   TAKE ONE TABLET BY MOUTH DAILY      levothyroxine 75 MCG tablet  Commonly known as: SYNTHROID, LEVOTHROID   TAKE ONE TABLET BY MOUTH DAILY      melatonin 3 MG tablet   3 mg, Oral, Nightly      tamsulosin 0.4 MG capsule 24 hr capsule  Commonly known as: FLOMAX   1 capsule, Oral, Nightly      vitamin B-12 1000 MCG tablet  Commonly known as: CYANOCOBALAMIN   TAKE ONE TABLET BY MOUTH DAILY      vitamin D3 125 MCG (5000 UT) capsule capsule   5,000 Units, Oral, Daily         Stop These Medications    amLODIPine 5 MG tablet  Commonly known as: NORVASC     furosemide 40 MG tablet  Commonly known as: LASIX     lisinopril 10 MG tablet  Commonly known as: PRINIVIL,ZESTRIL          Diet Instructions     Diet: Consistent Carbohydrate      Discharge Diet: Consistent Carbohydrate    Low potassium 2 g (50 mEq) per 24 hours.        Future Appointments   Date Time Provider Department Center   12/28/2021 11:00 AM Nida Loeps APRN [facility]   2/9/2022 12:10 PM LAB CHAIR 5 [facility]  LAB [facility]   2/9/2022 12:40 PM Kim Hoyos MD PhD [facility]   6/29/2022  2:00 PM Alisia Paula APRN [facility]     Additional Instructions for the Follow-ups that You Need to Schedule      Discharge Follow-up with PCP   As directed       Currently Documented PCP:    Florencia Cablalero MD    PCP Phone Number:    [phone]     Follow Up Details: 1 week         Discharge Follow-up with Specialty: Nephrology   As directed      Specialty: Nephrology    Follow Up Details: As directed            Contact information for follow-up providers     Florencia Caballero MD .    Specialty: Internal Medicine  Why: 1 week  Contact information:  [address] [facility] [address]  [phone]                   Contact information for after-discharge care     Home Medical Care     [facility] .    Service: Home Health Services  Contact information:  [address] [place] [id number] [phone]                           Discharge Order (From admission, onward)     Start     Ordered    12/22/21 1210  Discharge patient  Once        Expected Discharge Date: 12/22/21    Discharge Disposition: Home or Self Care    Physician of Record for Attribution - Please select from Treatment Team: FLO SHERMAN [[id number]]    Review needed by CMO to determine Physician of Record: No       Question Answer Comment   Physician of Record for Attribution - Please select from Treatment Team FLO SHERMAN    Review needed by CMO to determine Physician of Record No        12/22/21 1216                  Total time spent discharging patient including evaluation,post hospitalization follow up,  medication and post hospitalization instructions and education total time exceeds 30 minutes.    Signed:  Flo Sherman MD  12/22/2021  12:16 EST    EMR Dragon/Transcription disclaimer:   Much of this encounter note is an electronic transcription/translation of spoken language to printed text. The electronic translation of spoken language may permit erroneous, or at times, nonsensical words or phrases to be inadvertently transcribed; Although I have reviewed the note for such errors, some may still  exist.

## 2021-12-22 NOTE — CASE MANAGEMENT/SOCIAL WORK
Continued Stay Note  [facility]     Patient Name: Carlito Mo  MRN: [id number]  Today's Date: 12/22/2021    Admit Date: 12/13/2021     Discharge Plan     Row Name 12/22/21 1318       Plan    Plan Comments CCP noted discharge orders. Dr. Rivas declines to order HH and patient made aware. PT/OT signed off with no HH recommendations. Patient aware he can contact PCP if he still wishes to have home health. serenity martinez rn/ccp    Final Discharge Disposition Code 01 - home or self-care    Final Note --               Discharge Codes    No documentation.               Expected Discharge Date and Time     Expected Discharge Date Expected Discharge Time    Dec 22, 2021             Amanda Martinez

## 2021-12-22 NOTE — PROGRESS NOTES
Clinical Pharmacy Services: Heart Failure Education    Carlito Mo was counseled over heart failure prior to discharge.     The patient was also counseled on all heart failure medications prior to discharge including name of medication, indication, and possible side effects.    Additional counseling points included but were not limited to:  1. Weigh yourself every morning after emptying your bladder and compare your weight to the day before  2. Keep the total amount of fluids you drink to only 6-8 glasses each day (48-64 ounces) or as otherwise directed by your physician  3. Take your medicine exactly as prescribed  4. Check for swelling in your feet, ankles, legs, and stomach  5. Eat foods that are low in salt or salt-free. Limit your sodium intake to <2000mg/day  6. Balance activity and rest periods  7. Do not smoke, and limit alcohol intake (No more than 2 drinks a day for men and 1 drink a day for women)    He was also instructed to contact his heart doctor immediately if he notices any of the following signs:  1. Weight gain of >2 pounds in 1 day or 5 pounds in 1 week  2. Vomiting and/or diarrhea that lasts more than 2 days  3. Feeling more shortness of breath than usual  4. Increased swelling in your feet, ankles, legs, or stomach  5. Development of a dry, hacking cough OR a productive cough with frothy sputum  6. Feeling more tired and having less energy to complete daily tasks  7. Feeling light-headed or dizzy  8. Having difficulty breathing when lying down flat     The patient was provided with educational materials for the above counseling points we reviewed to keep as a reference.     All other questions from the patient were answered at this time.   ?  Aron Ulloa [facility]    COVID-19 Precautions:  -The patient was wearing a mask during the interview session.  -I remained 6 feet away from the patient and was not present in the room for longer than 15 minutes.  -I was wearing a face mask and eye  protection during the interview session.

## 2021-12-22 NOTE — CASE MANAGEMENT/SOCIAL WORK
Case Management Discharge Note      Final Note: home via family with [facility]    Provided Post Acute Provider List?: N/A  N/A Provider List Comment: Denies HH/SNF needs.  Provided Post Acute Provider Quality & Resource List?: N/A  N/A Quality & Resource List Comment: Denies HH/SNF needs.    Selected Continued Care - Admitted Since 12/13/2021     Destination    No services have been selected for the patient.              Durable Medical Equipment    No services have been selected for the patient.              Dialysis/Infusion    No services have been selected for the patient.              Home Medical Care Coordination complete.    Service Provider Selected Services Address Phone Fax Patient Preferred    [facility] [address] [place] [id number] [phone] --          Therapy    No services have been selected for the patient.              Community Resources    No services have been selected for the patient.              Community & DME    No services have been selected for the patient.                  Transportation Services  Private: Car    Final Discharge Disposition Code: 06 - home with home health care

## 2021-12-22 NOTE — PROGRESS NOTES
"DAILY PROGRESS NOTE  [facility]    Patient Identification:  Name: Carlito Mo  Age: 66 y.o.  Sex: male  :  1955  MRN: [id number]         Primary Care Physician: Florencia Caballero MD      Subjective  Patient with no acute complaints.    Objective:  General Appearance:  Comfortable, well-appearing, in no acute distress and not in pain.    Vital signs: (most recent): Blood pressure 124/72, pulse 63, temperature 97.3 °F (36.3 °C), temperature source Oral, resp. rate 18, height 182.9 cm (72\"), weight 85.5 kg (188 lb 6.4 oz), SpO2 91 %.    Lungs:  Normal effort and normal respiratory rate.  Breath sounds clear to auscultation.    Heart: Normal rate.  Regular rhythm.    Extremities: There is dependent edema.    Neurological: Patient is alert and oriented to person, place and time.    Skin:  Warm and dry.                Vital signs in last 24 hours:  Temp:  [97.2 °F (36.2 °C)-97.8 °F (36.6 °C)] 97.3 °F (36.3 °C)  Heart Rate:  [58-63] 63  Resp:  [18] 18  BP: (103-124)/(50-72) 124/72    Intake/Output:    Intake/Output Summary (Last 24 hours) at 2021 1918  Last data filed at 2021 1720  Gross per 24 hour   Intake 1080 ml   Output 300 ml   Net 780 ml         Results from last 7 days   Lab Units 21  1100 21  0556 21  0502 21  0556 21  0625 12/15/21  0410   WBC 10*3/mm3 5.56 5.08 6.48 5.91 5.32 5.68   HEMOGLOBIN g/dL 9.7* 9.5* 9.4* 9.4* 9.6* 9.4*   PLATELETS 10*3/mm3 226 223 222 229 240 234     Results from last 7 days   Lab Units 21  1310 21  1100 21  0556 21  0502 21  0556 21  0625 12/15/21  0410   SODIUM mmol/L 137 135* 137 138 140 137 142   POTASSIUM mmol/L 4.4 5.5* 4.7 4.5 5.0 4.4 4.2   CHLORIDE mmol/L 105 105 104 105 108* 106 109*   CO2 mmol/L 22.2 21.6* 21.8* 23.5 23.3 23.4 24.1   BUN mg/dL 64* 64* 60* 57* 47* 39* 29*   CREATININE mg/dL 4.00* 3.72* 3.50* 3.62* 3.49* 3.07* 2.65*   GLUCOSE mg/dL 149* 172* 106* 81 102* 62* " 73   Estimated Creatinine Clearance: 22 mL/min (A) (by C-G formula based on SCr of 4 mg/dL (H)).  Results from last 7 days   Lab Units 12/21/21  1310 12/20/21  1100 12/19/21  0556 12/18/21  0502 12/17/21  0556 12/16/21  0625 12/15/21  0410   CALCIUM mg/dL 8.4* 8.2* 8.5* 8.4* 8.2* 8.2* 8.1*   ALBUMIN g/dL 2.70*  --   --   --   --   --   --    MAGNESIUM mg/dL  --   --   --   --   --   --  1.8   PHOSPHORUS mg/dL 4.8*  --   --   --   --   --  4.4     Results from last 7 days   Lab Units 12/21/21  1310   ALBUMIN g/dL 2.70*       Assessment:    Acute on chronic combined systolic and diastolic congestive heart failure ([facility])    Vitamin D deficiency    Type 2 diabetes mellitus with ophthalmic complication ([facility])    Acquired hypothyroidism    Hypertensive urgency    Pleural effusion    YAW (obstructive sleep apnea)    Coronary artery disease involving native coronary artery of native heart without angina pectoris    Chronically elevated troponin    Anemia, chronic renal failure, stage 3 (moderate) ([facility])    Essential hypertension    Patent foramen ovale    Pleural effusion, bilateral    Cardiomyopathy ([facility])    Lower abdominal pain    Diarrhea    CKD (chronic kidney disease) stage 4, GFR 15-29 ml/min ([facility])    66-year-old male with a history of diabetes, coronary artery disease status post CABG, CKD, chronic systolic heart failure, and CVA in May 2021 who presented with abdominal pain, nausea and vomiting.  He is admitted for acute on chronic combined systolic and diastolic heart failure and bilateral pleural effusions, likely due to medication noncompliance.     Acute on chronic systolic and diastolic heart failure  -Diuresis per nephrology.  Resume today.  Continue beta-blocker; ACE inhibitor- held.  Cardiology following    SYLVAIN:  Creatinine continues to increase.  Nephrology put appreciated.  Allowing blood pressure to drift up for improved renal perfusion.  Renal ultrasound and postvoid residual pending.     Bilateral pleural  effusions  -Status post right thoracentesis, 2 L removed on 12/15.  Pulmonology recommends hold off on left sided thoracentesis unless his respiratory status worsens.      History of stroke, May 2021  -On apixaban     Type 2 diabetes  -Stopped basal insulin due to some hypoglycemia, continue sliding scale insulin     Hyperkalemia  Continue sodium zirconium.     Hypertensive urgency- resolved    Anemia:  Multifactorial including chronic kidney disease, anemia of chronic disease, iron deficiency.    All problems new to this examiner.    Plan:  Please see above.  Discussed with RN.    Flo Sherman MD  12/21/2021  19:18 EST

## 2021-12-22 NOTE — CASE MANAGEMENT/SOCIAL WORK
Case Management Discharge Note      Final Note: Home via family    Provided Post Acute Provider List?: N/A  N/A Provider List Comment: Denies HH/SNF needs.  Provided Post Acute Provider Quality & Resource List?: N/A  N/A Quality & Resource List Comment: Denies HH/SNF needs.    Selected Continued Care - Admitted Since 12/13/2021     Destination    No services have been selected for the patient.              Durable Medical Equipment    No services have been selected for the patient.              Dialysis/Infusion    No services have been selected for the patient.              Home Medical Care    No services have been selected for the patient.              Therapy    No services have been selected for the patient.              Community Resources    No services have been selected for the patient.              Community & DME    No services have been selected for the patient.                  Transportation Services  Private: Car    Final Discharge Disposition Code: 01 - home or self-care

## 2021-12-22 NOTE — DISCHARGE INSTRUCTIONS
Torsemide Oral Tablets  What is this medicine?  TORSEMIDE (TORE se mide) is a diuretic. It helps you make more urine and lose salt and water from your body. It treats swelling from heart, kidney, or liver disease. It also treats high blood pressure.  This medicine may be used for other purposes; ask your health care provider or pharmacist if you have questions.  COMMON BRAND NAME(S): Demadex  What should I tell my health care provider before I take this medicine?  They need to know if you have any of these conditions:  · high or low levels of electrolytes, like magnesium, potassium, and sodium, in your blood  · diabetes  · gout  · kidney disease  · liver disease  · an unusual or allergic reaction to torsemide, povidone, other medicines, foods, dyes, or preservatives  · pregnant or trying to get pregnant  · breast-feeding  How should I use this medicine?  Take this drug by mouth with water. Take it as directed on the prescription label at the same time every day. Keep taking it unless your health care provider tells you to stop.  Talk to your health care provider about the use of this drug in children. Special care may be needed.  Overdosage: If you think you have taken too much of this medicine contact a poison control center or emergency room at once.  NOTE: This medicine is only for you. Do not share this medicine with others.  What if I miss a dose?  If you miss a dose, take it as soon as you can. If it is almost time for your next dose, take only that dose. Do not take double or extra doses.  What may interact with this medicine?  · alcohol  · aspirin and aspirin-like medicines  · celecoxib  · certain medicines for blood pressure, heart disease, irregular heartbeat  · certain medicines for cholesterol like cholestyramine  · certain medicines for diabetes  · cisplatin  · cyclosporine  · ephedra  · ginseng  · lithium  · medicines for infection like acyclovir, adefovir, amphotericin B, bacitracin, cidofovir,  foscarnet, ganciclovir, gentamicin, pentamidine, vancomycin  · medicines that relax muscles for surgery  · NSAIDs, medicines for pain and inflammation, like ibuprofen or naproxen  · other diuretics  · pamidronate  · probenecid  · rifampin  · steroid medicines like prednisone or cortisone  · warfarin  · zoledronic acid  This list may not describe all possible interactions. Give your health care provider a list of all the medicines, herbs, non-prescription drugs, or dietary supplements you use. Also tell them if you smoke, drink alcohol, or use illegal drugs. Some items may interact with your medicine.  What should I watch for while using this medicine?  Visit your health care provider for regular checks on your progress. Tell your health care provider if your symptoms do not start to get better or if they get worse. Check your blood pressure regularly. Ask your health care provider what your blood pressure should be. Also, find out when you should contact him or her. You may need blood work done while you are taking this drug.  Do not treat yourself for coughs, colds, or pain while using this drug without asking your health care provider for advice. Some drugs may increase your blood pressure.  This drug may increase blood sugar. Ask your health care provider if changes in diet or drugs are needed if you have diabetes.  You may need to be on a special diet while you are taking this drug. Ask your health care provider. Also, find out how many glasses of fluids you need to drink each day.  Check with your health care provider if you have severe diarrhea, nausea, and vomiting, or if you sweat a lot. The loss of too much body fluid may make it dangerous for you to take this drug.  You may get drowsy or dizzy. Do not drive, use machinery, or do anything that needs mental alertness until you know how this drug affects you. Do not stand or sit up quickly, especially if you are an older patient. This reduces the risk of  dizzy or fainting spells. Alcohol may interfere with the effects of this drug. Avoid alcoholic drinks.  What side effects may I notice from receiving this medicine?  Side effects that you should report to your doctor or health care professional as soon as possible:  · allergic reactions (skin rash, itching or hives; swelling of the face, lips, or tongue)  · decreased hearing, ringing in the ears  · electrolyte imbalance (increased thirst; loss of appetite; severe diarrhea; unusual sweating; vomiting)  · kidney injury (trouble passing urine or change in the amount of urine)  · low potassium (trouble breathing, chest pain; dizziness; fast, irregular heartbeat; feeling faints or lightheaded, falls; muscle cramps or pain)  Side effects that usually do not require medical attention (report to your doctor or health care professional if they continue or are bothersome):  · passing large amounts of urine  · stomach pain  This list may not describe all possible side effects. Call your doctor for medical advice about side effects. You may report side effects to FDA at 4-938-OML-6124.  Where should I keep my medicine?  Keep out of the reach of children and pets.  Store at room temperature between 15 and 30 degrees C (59 and 86 degrees F). Do not freeze. Throw away any unused drug after the expiration date.  NOTE: This sheet is a summary. It may not cover all possible information. If you have questions about this medicine, talk to your doctor, pharmacist, or health care provider.  © 2021 [employer]/Gold Standard (2020-09-03 19:52:40)    Sodium Zirconium Cyclosilicate oral suspension  What is this medicine?  SODIUM ZIRCONIUM CYCLOSILICATE (TIM ely um zir KOE nee um SYE kloe ORLY i srinivasan) takes potassium out of the body by binding to it in the intestines. It is used to treat too much potassium in the body.  This medicine may be used for other purposes; ask your health care provider or pharmacist if you have questions.  COMMON BRAND  NAME(S): JERRY  What should I tell my health care provider before I take this medicine?  They need to know if you have any of these conditions:  · blockage in your bowel  · diet low in salt  · dry hard stool that will not pass out of your rectum  · heart disease  · kidney disease  · an unusual or allergic reaction to sodium zirconium cyclosilicate, other medicines, foods, dyes, or preservatives  · pregnant or trying to get pregnant  · breast-feeding  How should I use this medicine?  Take this medicine by mouth. Follow the directions on the prescription label. Take other medicines at least 2 hours before or 2 hours after this medicine. Take your medicine at regular intervals. Do not take it more often than directed. Do not stop taking except on your doctor's advice.  Talk to your pediatrician regarding the use of this medicine in children. Special care may be needed.  Overdosage: If you think you have taken too much of this medicine contact a poison control center or emergency room at once.  NOTE: This medicine is only for you. Do not share this medicine with others.  What if I miss a dose?  If you miss a dose, take it as soon as you can. If it is almost time for your next dose, take only that dose. Do not take double or extra doses.  What may interact with this medicine?  Take other oral medicines 2 hours before or 2 hours after this medicine.  This list may not describe all possible interactions. Give your health care provider a list of all the medicines, herbs, non-prescription drugs, or dietary supplements you use. Also tell them if you smoke, drink alcohol, or use illegal drugs. Some items may interact with your medicine.  What should I watch for while using this medicine?  Tell your doctor or healthcare professional if your symptoms do not start to get better or if they get worse.  You may need blood work done while you are taking this medicine.  What side effects may I notice from receiving this  medicine?  Side effects that you should report to your doctor or health care professional as soon as possible:  · allergic reactions like skin rash, itching or hives, swelling of the face, lips, or tongue  · signs and symptoms of low potassium like muscle cramps or muscle pain; chest pain; dizziness; feeling faint or lightheaded, falls; palpitations; breathing problems; or fast, irregular heartbeat  · swelling of ankles, feet, hands  This list may not describe all possible side effects. Call your doctor for medical advice about side effects. You may report side effects to FDA at 7-637-DJF-3833.  Where should I keep my medicine?  Keep out of the reach of children.  Store at room temperature between 15 and 30 degrees C (59 and 86 degrees F). Throw away any unused medicine after the expiration date.  NOTE: This sheet is a summary. It may not cover all possible information. If you have questions about this medicine, talk to your doctor, pharmacist, or health care provider.  © 2021 [employer] Standard (2018-05-21 14:17:48)    Senna tablets or capsules  What is this medicine?  SENNA (SEN uh) is a laxative. This medicine is used to relieve constipation. It may also be used to empty and prepare the bowel for surgery or examination.  This medicine may be used for other purposes; ask your health care provider or pharmacist if you have questions.  COMMON BRAND NAME(S): Black Draught, Ex-Lax, Consuelo-brenna, Lax-Pills, Perdiem, Senexon, Senna, Senna-Lax, Senna-Tabs, Senna-Time, SennaGen, Sennatural, Senokot, Senokot Extra Strength, Senokot Xtra, SenoSol, SenoSol-X, Uni-Cenna  What should I tell my health care provider before I take this medicine?  They need to know if you have any of these conditions:  · blockage in your bowel  · inflammatory bowel disease  · stomach or intestine problems  · sudden change in bowel habits lasting more than 2 weeks  · vomiting  · an unusual or allergic reaction to senna, other medicines, foods,  dyes, or preservatives  · pregnant or trying to get pregnant  · breast-feeding  How should I use this medicine?  Take this medicine by mouth with a full glass of water. Follow the directions on the product label. Take exactly as directed. Do not take your medicine more often than directed.  Talk to your pediatrician regarding the use of this medicine in children. While this medicine may be prescribed for children as young as 2 years for selected conditions, precautions do apply.  Overdosage: If you think you have taken too much of this medicine contact a poison control center or emergency room at once.  NOTE: This medicine is only for you. Do not share this medicine with others.  What if I miss a dose?  If you miss a dose, take it as soon as you can. If it is almost time for your next dose, take only that dose. Do not take double or extra doses.  What may interact with this medicine?  Interactions are not expected. Do not use any other laxative products without talking to your healthcare professional.  This list may not describe all possible interactions. Give your health care provider a list of all the medicines, herbs, non-prescription drugs, or dietary supplements you use. Also tell them if you smoke, drink alcohol, or use illegal drugs. Some items may interact with your medicine.  What should I watch for while using this medicine?  Do not use for more than 1 week unless otherwise directed by your doctor or health care professional.  Stop using this medicine and contact your doctor or health care professional if you have rectal bleeding or do not have a bowel movement after use. These could be signs of a more serious condition.  What side effects may I notice from receiving this medicine?  Side effects that you should report to your doctor or health care professional as soon as possible:  · allergic reactions like skin rash, itching or hives, swelling of the face, lips, or tongue  · bloody or black, tarry  stools  · breathing problems  · muscle weakness  · stomach pain  · unusually weak or tired  · vomiting  Side effects that usually do not require medical attention (report to your doctor or health care professional if they continue or are bothersome):  · discolored urine (red or orange)  · gas  · upset stomach  This list may not describe all possible side effects. Call your doctor for medical advice about side effects. You may report side effects to FDA at 7-328-FEU-2418.  Where should I keep my medicine?  Keep out of the reach of children.  Store at room temperature between 15 and 30 degrees C (59 and 86 degrees F). Keep container tightly closed. Throw away any unused medicine after the expiration date.  NOTE: This sheet is a summary. It may not cover all possible information. If you have questions about this medicine, talk to your doctor, pharmacist, or health care provider.  © 2021 [employer]/Gold Standard (2019-02-08 09:09:21)    Heart Failure, Self Care  Heart failure is a serious condition. This sheet explains things you need to do to take care of yourself at home. To help you stay as healthy as possible, you may be asked to change your diet, take certain medicines, and make other changes in your life. Your doctor may also give you more specific instructions. If you have problems or questions, call your doctor.  What are the risks?  Having heart failure makes it more likely for you to have some problems. These problems can get worse if you do not take good care of yourself. Problems may include:  · Blood clotting problems. This may cause a stroke.  · Damage to the kidneys, liver, or lungs.  · Abnormal heart rhythms.  Supplies needed:  · Scale for weighing yourself.  · Blood pressure monitor.  · Notebook.  · Medicines.  How to care for yourself when you have heart failure  Medicines  Take over-the-counter and prescription medicines only as told by your doctor. Take your medicines every day.  · Do not stop taking  your medicine unless your doctor tells you to do so.  · Do not skip any medicines.  · Get your prescriptions refilled before you run out of medicine. This is important.  Eating and drinking    · Eat heart-healthy foods. Talk with a diet specialist (dietitian) to create an eating plan.  · Choose foods that:  ? Have no trans fat.  ? Are low in saturated fat and cholesterol.  · Choose healthy foods, such as:  ? Fresh or frozen fruits and vegetables.  ? Fish.  ? Low-fat (lean) meats.  ? Legumes, such as beans, peas, and lentils.  ? Fat-free or low-fat dairy products.  ? Whole-grain foods.  ? High-fiber foods.  · Limit salt (sodium) if told by your doctor. Ask your diet specialist to tell you which seasonings are healthy for your heart.  · Cook in healthy ways instead of frying. Healthy ways of cooking include roasting, grilling, broiling, baking, poaching, steaming, and stir-frying.  · Limit how much fluid you drink, if told by your doctor.    Alcohol use  · Do not drink alcohol if:  ? Your doctor tells you not to drink.  ? Your heart was damaged by alcohol, or you have very bad heart failure.  ? You are pregnant, may be pregnant, or are planning to become pregnant.  · If you drink alcohol:  ? Limit how much you use to:  § 0-1 drink a day for women.  § 0-2 drinks a day for men.  ? Be aware of how much alcohol is in your drink. In the [place] one drink equals one 12 oz bottle of beer (355 mL), one 5 oz glass of wine (148 mL), or one 1½ oz glass of hard liquor (44 mL).  Lifestyle    · Do not use any products that contain nicotine or tobacco, such as cigarettes, e-cigarettes, and chewing tobacco. If you need help quitting, ask your doctor.  ? Do not use nicotine gum or patches before talking to your doctor.  · Do not use illegal drugs.  · Lose weight if told by your doctor.  · Do physical activity if told by your doctor. Talk to your doctor before you begin an exercise if:  ? You are an older adult.  ? You have very bad  heart failure.  · Learn to manage stress. If you need help, ask your doctor.  · Get rehab (rehabilitation) to help you stay independent and to help with your quality of life.  · Plan time to rest when you get tired.    Check weight and blood pressure    · Weigh yourself every day. This will help you to know if fluid is building up in your body.  ? Weigh yourself every morning after you pee (urinate) and before you eat breakfast.  ? Wear the same amount of clothing each time.  ? Write down your daily weight. Give your record to your doctor.  · Check and write down your blood pressure as told by your doctor.  · Check your pulse as told by your doctor.    Dealing with very hot and very cold weather  · If it is very hot:  ? Avoid activities that take a lot of energy.  ? Use air conditioning or fans, or find a cooler place.  ? Avoid caffeine and alcohol.  ? Wear clothing that is loose-fitting, lightweight, and light-colored.  · If it is very cold:  ? Avoid activities that take a lot of energy.  ? Layer your clothes.  ? Wear mittens or gloves, a hat, and a scarf when you go outside.  ? Avoid alcohol.  Follow these instructions at home:  · Stay up to date with shots (vaccines). Get pneumococcal and flu (influenza) shots.  · Keep all follow-up visits as told by your doctor. This is important.  Contact a doctor if:  · You gain weight quickly.  · You have increasing shortness of breath.  · You cannot do your normal activities.  · You get tired easily.  · You cough a lot.  · You don't feel like eating or feel like you may vomit (nauseous).  · You become puffy (swell) in your hands, feet, ankles, or belly (abdomen).  · You cannot sleep well because it is hard to breathe.  · You feel like your heart is beating fast (palpitations).  · You get dizzy when you stand up.  Get help right away if:  · You have trouble breathing.  · You or someone else notices a change in your behavior, such as having trouble staying awake.  · You have  chest pain or discomfort.  · You pass out (faint).  These symptoms may be an emergency. Do not wait to see if the symptoms will go away. Get medical help right away. Call your local emergency services (911 in the [place] Do not drive yourself to the hospital.  Summary  · Heart failure is a serious condition. To care for yourself, you may have to change your diet, take medicines, and make other lifestyle changes.  · Take your medicines every day. Do not stop taking them unless your doctor tells you to do so.  · Eat heart-healthy foods, such as fresh or frozen fruits and vegetables, fish, lean meats, legumes, fat-free or low-fat dairy products, and whole-grain or high-fiber foods.  · Ask your doctor if you can drink alcohol. You may have to stop alcohol use if you have very bad heart failure.  · Contact your doctor if you gain weight quickly or feel that your heart is beating too fast. Get help right away if you pass out, or have chest pain or trouble breathing.  This information is not intended to replace advice given to you by your health care provider. Make sure you discuss any questions you have with your health care provider.  Document Revised: 03/31/2020 Document Reviewed: 04/01/2020  [employer] Patient Education © 2021 [employer]    Chronic Kidney Disease, Adult  Chronic kidney disease is when lasting damage happens to the kidneys slowly over a long time. The kidneys help to:  · Make pee (urine).  · Make hormones.  · Keep the right amount of fluids and chemicals in the body.  Most often, this disease does not go away. You must take steps to help keep the kidney damage from getting worse. If steps are not taken, the kidneys might stop working forever.  What are the causes?  · Diabetes.  · High blood pressure.  · Diseases that affect the heart and blood vessels.  · Other kidney diseases.  · Diseases of the body's disease-fighting system.  · A problem with the flow of pee.  · Infections of the organs that make  pee, store it, and take it out of the body.  · Swelling or irritation of your blood vessels.  What increases the risk?  · Getting older.  · Having someone in your family who has kidney disease or kidney failure.  · Having a disease caused by genes.  · Taking medicines often that harm the kidneys.  · Being near or having contact with harmful substances.  · Being very overweight.  · Using tobacco now or in the past.  What are the signs or symptoms?  · Feeling very tired.  · Having a swollen face, legs, ankles, or feet.  · Feeling like you may vomit or vomiting.  · Not feeling hungry.  · Being confused or not able to focus.  · Twitches and cramps in the leg muscles or other muscles.  · Dry, itchy skin.  · A taste of metal in your mouth.  · Making less pee, or making more pee.  · Shortness of breath.  · Trouble sleeping.  You may also become anemic or get weak bones. Anemic means there is not enough red blood cells or hemoglobin in your blood.  You may get symptoms slowly. You may not notice them until the kidney damage gets very bad.  How is this treated?  Often, there is no cure for this disease. Treatment can help with symptoms and help keep the disease from getting worse. You may need to:  · Avoid alcohol.  · Avoid foods that are high in salt, potassium, phosphorous, and protein.  · Take medicines for symptoms and to help control other conditions.  · Have dialysis. This treatment gets harmful waste out of your body.  · Treat other problems that cause your kidney disease or make it worse.  Follow these instructions at home:  Medicines  · Take over-the-counter and prescription medicines only as told by your doctor.  · Do not take any new medicines, vitamins, or supplements unless your doctor says it is okay.  Lifestyle    · Do not smoke or use any products that contain nicotine or tobacco. If you need help quitting, ask your doctor.  · If you drink alcohol:  ? Limit how much you use to:  § 0-1 drink a day for women  who are not pregnant.  § 0-2 drinks a day for men.  ? Know how much alcohol is in your drink. In the [place] one drink equals one 12 oz bottle of beer (355 mL), one 5 oz glass of wine (148 mL), or one 1½ oz glass of hard liquor (44 mL).  · Stay at a healthy weight. If you need help losing weight, ask your doctor.    General instructions    · Follow instructions from your doctor about what you cannot eat or drink.  · Track your blood pressure at home. Tell your doctor about any changes.  · If you have diabetes, track your blood sugar.  · Exercise at least 30 minutes a day, 5 days a week.  · Keep your shots (vaccinations) up to date.  · Keep all follow-up visits.    Where to find more information  · American Association of Kidney Patients: www.aakp.org  · National Kidney Foundation: www.kidney.org  · [employer]: www.akfinc.org  · Life Options: www.lifeoptions.org  · Kidney School: www.kidneyschool.org  Contact a doctor if:  · Your symptoms get worse.  · You get new symptoms.  Get help right away if:  · You get symptoms of end-stage kidney disease. These include:  ? Headaches.  ? Losing feeling in your hands or feet.  ? Easy bruising.  ? Having hiccups often.  ? Chest pain.  ? Shortness of breath.  ? Lack of menstrual periods, in women.  · You have a fever.  · You make less pee than normal.  · You have pain or you bleed when you pee or poop.  These symptoms may be an emergency. Get help right away. Call your local emergency services (911 in the [place]  · Do not wait to see if the symptoms will go away.  · Do not drive yourself to the hospital.  Summary  · Chronic kidney disease is when lasting damage happens to the kidneys slowly over a long time.  · Causes of this disease include diabetes and high blood pressure.  · Often, there is no cure for this disease. Treatment can help symptoms and help keep the disease from getting worse.  · Treatment may involve lifestyle changes, medicines, and dialysis.  This  information is not intended to replace advice given to you by your health care provider. Make sure you discuss any questions you have with your health care provider.  Document Revised: 03/24/2021 Document Reviewed: 03/24/2021  [employer] Patient Education © 2021 [employer]

## 2021-12-22 NOTE — PROGRESS NOTES
[facility] Progress Note      Patient Name: Carlito Mo  : 1955  MRN: [id number]  Primary Care Physician:  Florencia Caballero MD  Date of admission: 2021    Subjective     Interval History:   Follow Up SYLVAIN on CKD IV.  Tired.  No shortness of air or chest pain    Review of Systems:   As noted above    Objective     Vitals:   Temp:  [97.3 °F (36.3 °C)-97.5 °F (36.4 °C)] 97.3 °F (36.3 °C)  Heart Rate:  [58-71] 71  Resp:  [18] 18  BP: (103-133)/(50-77) 133/70    Intake/Output Summary (Last 24 hours) at 2021 1129  Last data filed at 2021 0928  Gross per 24 hour   Intake 930 ml   Output 1300 ml   Net -370 ml       Physical Exam:    General Appearance: alert, oriented x 3, no acute distress . Chronically ill .  Skin: warm and dry  HEENT: oral mucosa normal, nonicteric sclera  Neck: supple, no JVD  Lungs: Clear to auscultation.  Heart: RRR, normal S1 and S2  Abdomen: soft, nontender, non-distended. + bs  Extremities:1+ lower ext edema.   Neuro: normal speech and mental status     Scheduled Meds:     apixaban, 2.5 mg, Oral, Q12H  aspirin, 81 mg, Oral, Daily  atorvastatin, 80 mg, Oral, Nightly  buPROPion XL, 150 mg, Oral, Daily  carvedilol, 12.5 mg, Oral, BID With Meals  epoetin lane/lane-epbx, 10,000 Units, Subcutaneous, Weekly  ferrous sulfate, 325 mg, Oral, Daily With Breakfast & Dinner  folic acid, 1,000 mcg, Oral, Daily  insulin lispro, 0-9 Units, Subcutaneous, 4x Daily With Meals & Nightly  levothyroxine, 75 mcg, Oral, Q AM  polyethylene glycol, 17 g, Oral, Daily  senna-docusate sodium, 2 tablet, Oral, BID  sodium chloride, 10 mL, Intravenous, Q12H  sodium zirconium cyclosilicate, 10 g, Oral, Daily  tamsulosin, 0.4 mg, Oral, Nightly  torsemide, 60 mg, Oral, Daily  vitamin B-12, 1,000 mcg, Oral, Daily  vitamin D, 50,000 Units, Oral, Q7 Days      IV Meds:        Results Reviewed:   I have personally reviewed the results from the time of this admission to 2021  11:29 EST     Results from last 7 days   Lab Units 12/22/21  0508 12/21/21  1310 12/20/21  1100   SODIUM mmol/L 136 137 135*   POTASSIUM mmol/L 4.5 4.4 5.5*   CHLORIDE mmol/L 106 105 105   CO2 mmol/L 20.3* 22.2 21.6*   BUN mg/dL 66* 64* 64*   CREATININE mg/dL 3.91* 4.00* 3.72*   CALCIUM mg/dL 8.5* 8.4* 8.2*   GLUCOSE mg/dL 132* 149* 172*       Estimated Creatinine Clearance: 22.4 mL/min (A) (by C-G formula based on SCr of 3.91 mg/dL (H)).    Results from last 7 days   Lab Units 12/22/21  0508 12/21/21  1310   PHOSPHORUS mg/dL 5.4* 4.8*             Results from last 7 days   Lab Units 12/20/21  1100 12/19/21  0556 12/18/21  0502 12/17/21  0556 12/16/21  0625   WBC 10*3/mm3 5.56 5.08 6.48 5.91 5.32   HEMOGLOBIN g/dL 9.7* 9.5* 9.4* 9.4* 9.6*   PLATELETS 10*3/mm3 226 223 222 229 240             Assessment / Plan     ASSESSMENT:  1. Abigail on CKD IV. Baseline crt 2.6.  Creatinine rising.  Mild volume excess by exam, weight up.  Discussed po fluid restriction.  Resume diuretic today. No uremic signs or symptoms.   2. Chronic combined systolic and fixed restrictive diastolic heart failure. Resume diuretic today.   3. Anemia CKD.  HAI.  4. DM2 uncontrolled.  Nephrotic proteinuria.   5. YAW  PLAN:  1.  Okay to discharge from kidney standpoint.  She is heading toward dialysis  2. Torsemide 60 mg daily.  3.  Patient will need BMP and nephrology follow-up in 1 to 2 weeks    Vitor Parmar MD  12/22/21  11:29 EST    [facility] \[facility]\""  [phone]

## 2021-12-23 ENCOUNTER — READMISSION MANAGEMENT (OUTPATIENT)
Dept: CALL CENTER | Facility: HOSPITAL | Age: 66
End: 2021-12-23

## 2021-12-23 NOTE — OUTREACH NOTE
Prep Survey      Responses   [facility] facility patient discharged from? [place]   Is LACE score < 7 ? No   Emergency Room discharge w/ pulse ox? No   Eligibility Readm Mgmt   Discharge diagnosis Acute on chronic systolic and diastolic CHF   Does the patient have one of the following disease processes/diagnoses(primary or secondary)? Other   Does the patient have Home health ordered? No   Is there a DME ordered? No   Medication alerts for this patient Demadex    Prep survey completed? Yes          Katie Vasquez RN

## 2021-12-28 ENCOUNTER — OFFICE VISIT (OUTPATIENT)
Dept: CARDIOLOGY | Facility: CLINIC | Age: 66
End: 2021-12-28

## 2021-12-28 VITALS
WEIGHT: 183 LBS | OXYGEN SATURATION: 98 % | RESPIRATION RATE: 16 BRPM | DIASTOLIC BLOOD PRESSURE: 80 MMHG | SYSTOLIC BLOOD PRESSURE: 130 MMHG | BODY MASS INDEX: 24.79 KG/M2 | HEART RATE: 69 BPM | HEIGHT: 72 IN

## 2021-12-28 DIAGNOSIS — Z79.4 TYPE 2 DIABETES MELLITUS WITH MODERATE NONPROLIFERATIVE RETINOPATHY WITHOUT MACULAR EDEMA, WITH LONG-TERM CURRENT USE OF INSULIN, UNSPECIFIED LATERALITY (HCC): ICD-10-CM

## 2021-12-28 DIAGNOSIS — Z51.81 ENCOUNTER FOR THERAPEUTIC DRUG LEVEL MONITORING: ICD-10-CM

## 2021-12-28 DIAGNOSIS — G47.33 OSA (OBSTRUCTIVE SLEEP APNEA): ICD-10-CM

## 2021-12-28 DIAGNOSIS — I25.10 CORONARY ARTERY DISEASE INVOLVING NATIVE CORONARY ARTERY OF NATIVE HEART WITHOUT ANGINA PECTORIS: Primary | ICD-10-CM

## 2021-12-28 DIAGNOSIS — I10 ESSENTIAL HYPERTENSION: ICD-10-CM

## 2021-12-28 DIAGNOSIS — N18.4 CKD (CHRONIC KIDNEY DISEASE), STAGE IV (HCC): ICD-10-CM

## 2021-12-28 DIAGNOSIS — E11.3399 TYPE 2 DIABETES MELLITUS WITH MODERATE NONPROLIFERATIVE RETINOPATHY WITHOUT MACULAR EDEMA, WITH LONG-TERM CURRENT USE OF INSULIN, UNSPECIFIED LATERALITY (HCC): ICD-10-CM

## 2021-12-28 PROCEDURE — 99214 OFFICE O/P EST MOD 30 MIN: CPT | Performed by: NURSE PRACTITIONER

## 2021-12-28 NOTE — PROGRESS NOTES
Date of Office Visit: 21  Encounter Provider: JI Triplett  Place of Service: [facility] CARDIOLOGY  Patient Name: Carlito Mo  :1955    Chief Complaint   Patient presents with   • Coronary Artery Disease   :     HPI: Carlito Mo is a 66 y.o. male  with CAD with MI and CABG in , HTN, HLD, type 2 diabetes, anemia, chronic kidney disease, Hx CVAs in 2017, and basel cell carcinoma.  His ECHO in 2017 [id number] mild LV hypertrophy, mild left atrial enlargement with negative bubble, normal LV systolic fx, and EF of 68%.  NEISHA ECHO in 2017 showed a lower EF of 45% with focal wall motion abnormalities primarliy in the septum and anterior wall, small patent foramen ovale, no significant valvular disease, or cardiac emboli source minus the small PFO. 24 hour Holter monitor on 12-15-17 showed a couple short bursts of SVT, but no sustained arrhythmias.  72 hour monitor in 2018 after a    Hospital visit Showed predominant NSR, rare PACs, 9 episodes of SVT peaking at 169, rare PVCs, and no VT.  A week later another ECHO calculated EF of 62.9 and normal LV function, grade 1 dysfunction, and mild MVR.    He was hospitalized May 2020 and diagnosed with new areas of cerebral infarction felt to be embolic.  Evidence of some chronic microhemorrhages.  Repeat ECHO  showed progressive LV fx decline with current EF of 31-35%, grade 3 LV dysfunction now, moderate to severe global hypokinesis.  Mildly dilated RV cavity, moderately reduced RV fx, milld MVR, small pericardial effusion (< 1cm), and left pleural effusion.    He received IV Lasix for acute heart failure and pulmonary edema.  He was transitioned to oral diuretic and medications were adjusted for new cardiomyopathy.  He was started on spironolactone and hydralazine was stopped.  He was switched to carvedilol and lisinopril was increased.  Nephrology evaluated and felt his baseline  "creatinine was 2.3-2.6.  His creatinine was 2.00 at discharge with normal potassium.  He was discharged on Eliquis in addition to aspirin.       He presented to the emergency room December 2021 with nausea and vomiting.  He admitted to not taking his diuretic.  His blood pressure was elevated 178/102.  Chest x-ray showed pulmonary vascular congestion with bilateral pleural effusions.  Nephrology and cardiology were consulted.  He was seen by pulmonary who recommended thoracentesis    His creatinine plateaued just a little under 4.0.  It was discussed that he would start dialysis in the next few months.  Furosemide was stopped.  Lisinopril was not continued as well as amlodipine.  Carvedilol was decreased.  Torsemide was adjusted to 60 mg at discharge.  He presents today for hospital discharge follow-up.  He states he is checking his weights which are stable.  Allergies   Allergen Reactions   • Prozac [Fluoxetine Hcl] Other (See Comments)     shaking           Family and social history reviewed.     ROS  All other systems were reviewed and are negative          Objective:     Vitals:    12/28/21 1120   BP: 130/80   BP Location: Right arm   Patient Position: Sitting   Cuff Size: Adult   Pulse: 69   Resp: 16   SpO2: 98%   Weight: 83 kg (183 lb)   Height: 182.9 cm (72.01\")     Body mass index is 24.81 kg/m².    PHYSICAL EXAM:  Constitutional:       General: Not in acute distress.     Appearance: Well-developed. Not diaphoretic.   HENT:      Head: Normocephalic.   Pulmonary:      Effort: Pulmonary effort is normal. No respiratory distress.      Breath sounds: Normal breath sounds. No wheezing. No rhonchi. No rales.   Cardiovascular:      Normal rate. Regular rhythm.   Pulses:     Radial: 2+ bilaterally.  Edema:     Ankle: bilateral trace edema of the ankle.  Skin:     General: Skin is warm and dry. There is no cyanosis.      Findings: No rash.   Neurological:      Mental Status: Alert and oriented to person, place, and " time.   Psychiatric:         Behavior: Behavior normal.         Thought Content: Thought content normal.         Judgment: Judgment normal.         Procedures      Current Outpatient Medications   Medication Sig Dispense Refill   • acetaminophen (TYLENOL) 325 MG tablet Take 2 tablets by mouth Every 4 (Four) Hours As Needed for Mild Pain  or Fever (Temperature greater than or equal to 37 C).     • apixaban (ELIQUIS) 2.5 MG tablet tablet Take 1 tablet by mouth Every 12 (Twelve) Hours. Indications: Other - full anticoagulation 60 tablet 0   • aspirin 81 MG EC tablet Take 81 mg by mouth Daily.     • atorvastatin (LIPITOR) 80 MG tablet Take 1 tablet by mouth Every Night. 30 tablet 0   • buPROPion XL (WELLBUTRIN XL) 150 MG 24 hr tablet TAKE ONE TABLET BY MOUTH EVERY MORNING     • carvedilol (COREG) 25 MG tablet Take 0.5 tablets by mouth 2 (Two) Times a Day With Meals. 60 tablet 0   • Cholecalciferol (VITAMIN D3) 5000 units capsule capsule Take 5,000 Units by mouth Daily.     • docusate sodium 100 MG capsule Take 100 mg by mouth.     • ferrous sulfate 325 (65 FE) MG tablet Take 1 tablet by mouth Daily With Breakfast & Dinner. 180 tablet 3   • folic acid (FOLVITE) 1 MG tablet TAKE ONE TABLET BY MOUTH DAILY 205 tablet 0   • Insulin Glargine (BASAGLAR KWIKPEN) 100 UNIT/ML injection pen Inject 4 Units under the skin into the appropriate area as directed Every Night. Patient says he does not take consistently     • insulin glulisine (APIDRA) 100 UNIT/ML injection Inject 2 Units under the skin into the appropriate area as directed 3 (Three) Times a Day Before Meals. Patient says he does not take consistently     • levothyroxine (SYNTHROID, LEVOTHROID) 75 MCG tablet TAKE ONE TABLET BY MOUTH DAILY     • melatonin 3 MG tablet Take 1 tablet by mouth Every Night. 30 tablet 0   • sennosides-docusate (PERICOLACE) 8.6-50 MG per tablet Take 2 tablets by mouth 2 (Two) Times a Day. 60 tablet 0   • sodium zirconium cyclosilicate (LOKELMA)  10 g pack Mix contents of one packet and take by mouth Daily. 30 packet 0   • tamsulosin (FLOMAX) 0.4 MG capsule 24 hr capsule Take 1 capsule by mouth Every Night.     • torsemide (DEMADEX) 20 MG tablet Take 3 tablets by mouth Daily. 30 tablet 0   • vitamin B-12 (CYANOCOBALAMIN) 1000 MCG tablet TAKE ONE TABLET BY MOUTH DAILY 205 tablet 0     No current facility-administered medications for this visit.     Assessment:       Diagnosis Plan   1. Coronary artery disease involving native coronary artery of native heart without angina pectoris     2. Essential hypertension     3. Type 2 diabetes mellitus with moderate nonproliferative retinopathy without macular edema, with long-term current use of insulin, unspecified laterality ([facility])     4. YAW (obstructive sleep apnea)     5. Encounter for therapeutic drug level monitoring  Basic Metabolic Panel   6. CKD (chronic kidney disease), stage IV ([facility])  Basic Metabolic Panel        Orders Placed This Encounter   Procedures   • Basic Metabolic Panel     Standing Status:   Future     Standing Expiration Date:   12/28/2022     Order Specific Question:   Release to patient     Answer:   Immediate         Plan:       1.  66 y.o. male  with coronary artery disease, myocardial infarction and CABG in 2001.  No angina continue the same  2.  Chronic systolic congestive heart failure.  Last echo May 2021 showed ejection fraction 30-35%.  Recently inpatient and furosemide was stopped.  He is now on torsemide 60 mg daily.  Goal-directed medical therapy includes carvedilol.  Not on ACE/ARB or Arni or spironolactone due to renal function.  I will help to get follow-up arranged with nephrology  4.  Hypertension blood pressure appears stable continue the same 5. YAW -states he does have a machine, but does not wear it like he supposed to.   6. Hx of CVA's to be maintained on Eliquis  2.5 mg twice daily and 81 mg aspirin daily.  He is on low-dose Eliquis due to renal function  7. HLD now on  maximum intensity atorvastatin, atorvastatin 80 mg daily.  Denies muscle aches and pains.  8. T2DM -  9. Anemia -on vitamin B12 and folic acid daily.    Last hemoglobin was stable at 9.78 days ago  10.  Stage IV kidney disease-I called and scheduled an appointment with Dr. Bryan Huddleston on this upcoming Monday.  Patient was given his appointment time and advised to keep that for follow-up renal function and recommendations.  Plan is for possible dialysis to start in the next few months      follow-up in approximately 8 weeks.  Call questions or concerns or significant weight gain          It has been a pleasure to participate in this patient's care.      Thank you,  JI Triplett      **I used Dragon to dictate this note:**

## 2022-01-03 ENCOUNTER — READMISSION MANAGEMENT (OUTPATIENT)
Dept: CALL CENTER | Facility: HOSPITAL | Age: 67
End: 2022-01-03

## 2022-01-03 NOTE — OUTREACH NOTE
Medical Week 2 Survey      Responses   [facility] patient discharged from? [place]   Does the patient have one of the following disease processes/diagnoses(primary or secondary)? Other   Week 2 attempt successful? No   Unsuccessful attempts Attempt 1          Pam Garza RN

## 2022-01-06 ENCOUNTER — READMISSION MANAGEMENT (OUTPATIENT)
Dept: CALL CENTER | Facility: HOSPITAL | Age: 67
End: 2022-01-06

## 2022-01-06 NOTE — OUTREACH NOTE
Medical Week 2 Survey      Responses   [facility] patient discharged from? [place]   Does the patient have one of the following disease processes/diagnoses(primary or secondary)? Other   Week 2 attempt successful? No   Unsuccessful attempts Attempt 2          Heike Barahona RN

## 2022-01-14 ENCOUNTER — READMISSION MANAGEMENT (OUTPATIENT)
Dept: CALL CENTER | Facility: HOSPITAL | Age: 67
End: 2022-01-14

## 2022-01-14 NOTE — OUTREACH NOTE
Medical Week 3 Survey      Responses   [facility] [place] patient discharged from? [place]   Does the patient have one of the following disease processes/diagnoses(primary or secondary)? Other   Week 3 attempt successful? No   Unsuccessful attempts Attempt 1          Rachel Hooker RN

## 2022-01-26 LAB
MYCOBACTERIUM SPEC CULT: NORMAL
NIGHT BLUE STAIN TISS: NORMAL

## 2022-01-27 ENCOUNTER — TELEPHONE (OUTPATIENT)
Dept: ONCOLOGY | Facility: CLINIC | Age: 67
End: 2022-01-27

## 2022-01-27 NOTE — TELEPHONE ENCOUNTER
HUB TO READ:  Left msg for pt to call to update his supplemental ins to medicare.  Epic indicating pt may now have [place] Medicaid as a supplement.  Please send me a staff msg when pt calls with this info.  ThanksKatelyn at [facility]

## 2022-02-01 ENCOUNTER — APPOINTMENT (OUTPATIENT)
Dept: GENERAL RADIOLOGY | Facility: HOSPITAL | Age: 67
End: 2022-02-01

## 2022-02-01 ENCOUNTER — HOSPITAL ENCOUNTER (INPATIENT)
Facility: HOSPITAL | Age: 67
LOS: 2 days | Discharge: HOME | End: 2022-02-04
Attending: EMERGENCY MEDICINE | Admitting: HOSPITALIST

## 2022-02-01 DIAGNOSIS — R53.83 OTHER FATIGUE: ICD-10-CM

## 2022-02-01 DIAGNOSIS — Z91.14 NONCOMPLIANCE WITH MEDICATIONS: ICD-10-CM

## 2022-02-01 DIAGNOSIS — I10 HYPERTENSION NOT AT GOAL: Primary | ICD-10-CM

## 2022-02-01 PROBLEM — I16.1 HYPERTENSIVE EMERGENCY: Status: ACTIVE | Noted: 2022-02-01

## 2022-02-01 LAB
ALBUMIN SERPL-MCNC: 3.5 G/DL (ref 3.5–5.2)
ALBUMIN/GLOB SERPL: 1.3 G/DL
ALP SERPL-CCNC: 90 U/L (ref 39–117)
ALT SERPL W P-5'-P-CCNC: 23 U/L (ref 1–41)
ANION GAP SERPL CALCULATED.3IONS-SCNC: 12 MMOL/L (ref 5–15)
AST SERPL-CCNC: 15 U/L (ref 1–40)
B PARAPERT DNA SPEC QL NAA+PROBE: NOT DETECTED
B PERT DNA SPEC QL NAA+PROBE: NOT DETECTED
BACTERIA UR QL AUTO: NORMAL /HPF
BASOPHILS # BLD AUTO: 0.01 10*3/MM3 (ref 0–0.2)
BASOPHILS NFR BLD AUTO: 0.2 % (ref 0–1.5)
BILIRUB SERPL-MCNC: 0.2 MG/DL (ref 0–1.2)
BILIRUB UR QL STRIP: NEGATIVE
BUN SERPL-MCNC: 62 MG/DL (ref 8–23)
BUN/CREAT SERPL: 18.1 (ref 7–25)
C PNEUM DNA NPH QL NAA+NON-PROBE: NOT DETECTED
CALCIUM SPEC-SCNC: 9.1 MG/DL (ref 8.6–10.5)
CHLORIDE SERPL-SCNC: 102 MMOL/L (ref 98–107)
CLARITY UR: CLEAR
CO2 SERPL-SCNC: 26 MMOL/L (ref 22–29)
COLOR UR: YELLOW
CREAT SERPL-MCNC: 3.43 MG/DL (ref 0.76–1.27)
DEPRECATED RDW RBC AUTO: 40.5 FL (ref 37–54)
EOSINOPHIL # BLD AUTO: 0.16 10*3/MM3 (ref 0–0.4)
EOSINOPHIL NFR BLD AUTO: 2.5 % (ref 0.3–6.2)
ERYTHROCYTE [DISTWIDTH] IN BLOOD BY AUTOMATED COUNT: 14 % (ref 12.3–15.4)
FLUAV SUBTYP SPEC NAA+PROBE: NOT DETECTED
FLUBV RNA ISLT QL NAA+PROBE: NOT DETECTED
GFR SERPL CREATININE-BSD FRML MDRD: 18 ML/MIN/1.73
GLOBULIN UR ELPH-MCNC: 2.6 GM/DL
GLUCOSE BLDC GLUCOMTR-MCNC: 153 MG/DL (ref 70–130)
GLUCOSE SERPL-MCNC: 227 MG/DL (ref 65–99)
GLUCOSE UR STRIP-MCNC: ABNORMAL MG/DL
HADV DNA SPEC NAA+PROBE: NOT DETECTED
HCOV 229E RNA SPEC QL NAA+PROBE: NOT DETECTED
HCOV HKU1 RNA SPEC QL NAA+PROBE: NOT DETECTED
HCOV NL63 RNA SPEC QL NAA+PROBE: NOT DETECTED
HCOV OC43 RNA SPEC QL NAA+PROBE: NOT DETECTED
HCT VFR BLD AUTO: 37.2 % (ref 37.5–51)
HGB BLD-MCNC: 12 G/DL (ref 13–17.7)
HGB UR QL STRIP.AUTO: ABNORMAL
HMPV RNA NPH QL NAA+NON-PROBE: NOT DETECTED
HPIV1 RNA ISLT QL NAA+PROBE: NOT DETECTED
HPIV2 RNA SPEC QL NAA+PROBE: NOT DETECTED
HPIV3 RNA NPH QL NAA+PROBE: NOT DETECTED
HPIV4 P GENE NPH QL NAA+PROBE: NOT DETECTED
HYALINE CASTS UR QL AUTO: NORMAL /LPF
IMM GRANULOCYTES # BLD AUTO: 0.02 10*3/MM3 (ref 0–0.05)
IMM GRANULOCYTES NFR BLD AUTO: 0.3 % (ref 0–0.5)
KETONES UR QL STRIP: NEGATIVE
LEUKOCYTE ESTERASE UR QL STRIP.AUTO: NEGATIVE
LYMPHOCYTES # BLD AUTO: 1.22 10*3/MM3 (ref 0.7–3.1)
LYMPHOCYTES NFR BLD AUTO: 19.4 % (ref 19.6–45.3)
M PNEUMO IGG SER IA-ACNC: NOT DETECTED
MCH RBC QN AUTO: 26.3 PG (ref 26.6–33)
MCHC RBC AUTO-ENTMCNC: 32.3 G/DL (ref 31.5–35.7)
MCV RBC AUTO: 81.4 FL (ref 79–97)
MONOCYTES # BLD AUTO: 0.61 10*3/MM3 (ref 0.1–0.9)
MONOCYTES NFR BLD AUTO: 9.7 % (ref 5–12)
NEUTROPHILS NFR BLD AUTO: 4.26 10*3/MM3 (ref 1.7–7)
NEUTROPHILS NFR BLD AUTO: 67.9 % (ref 42.7–76)
NITRITE UR QL STRIP: NEGATIVE
NRBC BLD AUTO-RTO: 0 /100 WBC (ref 0–0.2)
NT-PROBNP SERPL-MCNC: 3346 PG/ML (ref 0–900)
PH UR STRIP.AUTO: 5.5 [PH] (ref 5–8)
PLATELET # BLD AUTO: 204 10*3/MM3 (ref 140–450)
PMV BLD AUTO: 10.7 FL (ref 6–12)
POTASSIUM SERPL-SCNC: 3.9 MMOL/L (ref 3.5–5.2)
PROT SERPL-MCNC: 6.1 G/DL (ref 6–8.5)
PROT UR QL STRIP: ABNORMAL
QT INTERVAL: 491 MS
RBC # BLD AUTO: 4.57 10*6/MM3 (ref 4.14–5.8)
RBC # UR STRIP: NORMAL /HPF
REF LAB TEST METHOD: NORMAL
RHINOVIRUS RNA SPEC NAA+PROBE: NOT DETECTED
RSV RNA NPH QL NAA+NON-PROBE: NOT DETECTED
SARS-COV-2 RNA NPH QL NAA+NON-PROBE: NOT DETECTED
SODIUM SERPL-SCNC: 140 MMOL/L (ref 136–145)
SP GR UR STRIP: 1.01 (ref 1–1.03)
SQUAMOUS #/AREA URNS HPF: NORMAL /HPF
T4 FREE SERPL-MCNC: 1.39 NG/DL (ref 0.93–1.7)
TROPONIN T SERPL-MCNC: 0.1 NG/ML (ref 0–0.03)
TROPONIN T SERPL-MCNC: 0.11 NG/ML (ref 0–0.03)
TSH SERPL DL<=0.05 MIU/L-ACNC: 1.28 UIU/ML (ref 0.27–4.2)
UROBILINOGEN UR QL STRIP: ABNORMAL
WBC # UR STRIP: NORMAL /HPF
WBC NRBC COR # BLD: 6.28 10*3/MM3 (ref 3.4–10.8)

## 2022-02-01 PROCEDURE — 93005 ELECTROCARDIOGRAM TRACING: CPT | Performed by: PHYSICIAN ASSISTANT

## 2022-02-01 PROCEDURE — 80053 COMPREHEN METABOLIC PANEL: CPT | Performed by: PHYSICIAN ASSISTANT

## 2022-02-01 PROCEDURE — 0202U NFCT DS 22 TRGT SARS-COV-2: CPT | Performed by: PHYSICIAN ASSISTANT

## 2022-02-01 PROCEDURE — 84439 ASSAY OF FREE THYROXINE: CPT | Performed by: PHYSICIAN ASSISTANT

## 2022-02-01 PROCEDURE — 84443 ASSAY THYROID STIM HORMONE: CPT | Performed by: PHYSICIAN ASSISTANT

## 2022-02-01 PROCEDURE — 83880 ASSAY OF NATRIURETIC PEPTIDE: CPT | Performed by: PHYSICIAN ASSISTANT

## 2022-02-01 PROCEDURE — 85025 COMPLETE CBC W/AUTO DIFF WBC: CPT | Performed by: PHYSICIAN ASSISTANT

## 2022-02-01 PROCEDURE — 84484 ASSAY OF TROPONIN QUANT: CPT | Performed by: PHYSICIAN ASSISTANT

## 2022-02-01 PROCEDURE — 82962 GLUCOSE BLOOD TEST: CPT

## 2022-02-01 PROCEDURE — 36415 COLL VENOUS BLD VENIPUNCTURE: CPT

## 2022-02-01 PROCEDURE — 81001 URINALYSIS AUTO W/SCOPE: CPT | Performed by: PHYSICIAN ASSISTANT

## 2022-02-01 PROCEDURE — G0378 HOSPITAL OBSERVATION PER HR: HCPCS

## 2022-02-01 PROCEDURE — 93010 ELECTROCARDIOGRAM REPORT: CPT | Performed by: INTERNAL MEDICINE

## 2022-02-01 PROCEDURE — 99284 EMERGENCY DEPT VISIT MOD MDM: CPT

## 2022-02-01 PROCEDURE — 71045 X-RAY EXAM CHEST 1 VIEW: CPT

## 2022-02-01 PROCEDURE — 25010000002 HYDRALAZINE PER 20 MG: Performed by: PHYSICIAN ASSISTANT

## 2022-02-01 RX ORDER — LEVOTHYROXINE SODIUM 0.07 MG/1
75 TABLET ORAL DAILY
Status: DISCONTINUED | OUTPATIENT
Start: 2022-02-02 | End: 2022-02-04 | Stop reason: HOSPADM

## 2022-02-01 RX ORDER — HYDRALAZINE HYDROCHLORIDE 20 MG/ML
10 INJECTION INTRAMUSCULAR; INTRAVENOUS ONCE
Status: COMPLETED | OUTPATIENT
Start: 2022-02-01 | End: 2022-02-01

## 2022-02-01 RX ORDER — ATORVASTATIN CALCIUM 80 MG/1
80 TABLET, FILM COATED ORAL NIGHTLY
Status: DISCONTINUED | OUTPATIENT
Start: 2022-02-01 | End: 2022-02-02

## 2022-02-01 RX ORDER — SODIUM CHLORIDE 0.9 % (FLUSH) 0.9 %
10 SYRINGE (ML) INJECTION AS NEEDED
Status: DISCONTINUED | OUTPATIENT
Start: 2022-02-01 | End: 2022-02-04

## 2022-02-01 RX ORDER — SODIUM CHLORIDE 0.9 % (FLUSH) 0.9 %
10 SYRINGE (ML) INJECTION EVERY 12 HOURS SCHEDULED
Status: DISCONTINUED | OUTPATIENT
Start: 2022-02-01 | End: 2022-02-02

## 2022-02-01 RX ORDER — TAMSULOSIN HYDROCHLORIDE 0.4 MG/1
0.4 CAPSULE ORAL NIGHTLY
Status: DISCONTINUED | OUTPATIENT
Start: 2022-02-01 | End: 2022-02-04 | Stop reason: HOSPADM

## 2022-02-01 RX ORDER — LABETALOL HYDROCHLORIDE 5 MG/ML
10 INJECTION, SOLUTION INTRAVENOUS ONCE
Status: COMPLETED | OUTPATIENT
Start: 2022-02-01 | End: 2022-02-01

## 2022-02-01 RX ORDER — INSULIN LISPRO 100 [IU]/ML
2 INJECTION, SOLUTION INTRAVENOUS; SUBCUTANEOUS
Status: DISCONTINUED | OUTPATIENT
Start: 2022-02-02 | End: 2022-02-02

## 2022-02-01 RX ORDER — DEXTROSE MONOHYDRATE 25 G/50ML
25 INJECTION, SOLUTION INTRAVENOUS
Status: DISCONTINUED | OUTPATIENT
Start: 2022-02-01 | End: 2022-02-02

## 2022-02-01 RX ORDER — FERROUS SULFATE 325(65) MG
325 TABLET ORAL
Status: DISCONTINUED | OUTPATIENT
Start: 2022-02-01 | End: 2022-02-02

## 2022-02-01 RX ORDER — TORSEMIDE 20 MG/1
60 TABLET ORAL DAILY
Status: DISCONTINUED | OUTPATIENT
Start: 2022-02-02 | End: 2022-02-02

## 2022-02-01 RX ORDER — BUPROPION HYDROCHLORIDE 150 MG/1
150 TABLET ORAL EVERY MORNING
Status: DISCONTINUED | OUTPATIENT
Start: 2022-02-02 | End: 2022-02-04 | Stop reason: HOSPADM

## 2022-02-01 RX ORDER — CARVEDILOL 3.12 MG/1
12.5 TABLET ORAL 2 TIMES DAILY WITH MEALS
Status: DISCONTINUED | OUTPATIENT
Start: 2022-02-01 | End: 2022-02-02

## 2022-02-01 RX ORDER — FOLIC ACID 1 MG/1
1000 TABLET ORAL DAILY
Status: DISCONTINUED | OUTPATIENT
Start: 2022-02-02 | End: 2022-02-02

## 2022-02-01 RX ORDER — NICOTINE POLACRILEX 4 MG
15 LOZENGE BUCCAL
Status: DISCONTINUED | OUTPATIENT
Start: 2022-02-01 | End: 2022-02-02

## 2022-02-01 RX ORDER — ASPIRIN 81 MG/1
81 TABLET ORAL DAILY
Status: DISCONTINUED | OUTPATIENT
Start: 2022-02-02 | End: 2022-02-04 | Stop reason: HOSPADM

## 2022-02-01 RX ORDER — INSULIN LISPRO 100 [IU]/ML
0-9 INJECTION, SOLUTION INTRAVENOUS; SUBCUTANEOUS
Status: DISCONTINUED | OUTPATIENT
Start: 2022-02-02 | End: 2022-02-02

## 2022-02-01 RX ADMIN — ATORVASTATIN CALCIUM 80 MG: 80 TABLET, FILM COATED ORAL at 22:39

## 2022-02-01 RX ADMIN — INSULIN GLARGINE-YFGN 4 UNITS: 100 INJECTION, SOLUTION SUBCUTANEOUS at 22:39

## 2022-02-01 RX ADMIN — LABETALOL HYDROCHLORIDE 10 MG: 5 INJECTION, SOLUTION INTRAVENOUS at 18:59

## 2022-02-01 RX ADMIN — CARVEDILOL 12.5 MG: 12.5 TABLET, FILM COATED ORAL at 23:46

## 2022-02-01 RX ADMIN — SODIUM CHLORIDE, PRESERVATIVE FREE 10 ML: 5 INJECTION INTRAVENOUS at 22:40

## 2022-02-01 RX ADMIN — FERROUS SULFATE TAB 325 MG (65 MG ELEMENTAL FE) 325 MG: 325 (65 FE) TAB at 22:39

## 2022-02-01 RX ADMIN — TAMSULOSIN HYDROCHLORIDE 0.4 MG: 0.4 CAPSULE ORAL at 22:39

## 2022-02-01 RX ADMIN — HYDRALAZINE HYDROCHLORIDE 10 MG: 20 INJECTION INTRAMUSCULAR; INTRAVENOUS at 20:38

## 2022-02-01 RX ADMIN — APIXABAN 2.5 MG: 2.5 TABLET, FILM COATED ORAL at 22:39

## 2022-02-01 NOTE — ED NOTES
Pt wearing mask. Myself had mask, and eye wear/PPE when present with pt. Hand hygiene performed before and after pt care.     Amanda Reed, PCT  02/01/22 [id number]

## 2022-02-01 NOTE — ED PROVIDER NOTES
EMERGENCY DEPARTMENT ENCOUNTER    Room Number: 115/1  Date seen: 2/2/2022  Time seen: 15:00 EST  PCP: Florencia Caballero MD    Spoken Language:  English  Language interpretation services not needed     CHIEF COMPLAINT: fatigue    Cardiologist: Amber Murguia MD  Nephrologist: Bryan Huddleston MD    HPI: Carlito Mo is a 66 y.o. male with PMH of HTN, HLD, DM, CAD/MI (s/p CABG in 2001), multiple CVAs, CKD (stage 3b), sCHF, COPD, YAW (non-compliant with c-pap per recent cardiology note), hypothroidism and iron deficiency anemia presenting to the ED for evaluation of fatigue. The history is being obtained by the patient and by review of the medical chart.  The patient presents complaining of shortness of breath, chest tightness, fatigue and cough for the past 3 to 4 days.  When his symptoms started, the patient states he also had generalized abdominal pain and sore throat.  The symptoms have since resolved.  The patient denies fever, chills, nausea, vomiting or diarrhea.  The patient has had his initial 2 Covid vaccines, but is overdue for his booster.  He denies any recent sick contacts.  The patient states that he has recently ran out of some of his medications, but he is unsure which medications these are.      MEDICAL RECORD REVIEW:  Reviewed in EPIC. The patient was seen to establish care by Dr. Huddleston, nephrologist, on 01/03/2022.  He increased the patient's torsemide to 80 mg daily and recommended that he follow up in 1 month.  If the patient's renal function does not improve, then he will need to consider initiating dialysis.    PAST MEDICAL HISTORY  Past Medical History:   Diagnosis Date   • Acquired hypothyroidism    • Acute congestive heart failure (HCC)    • Acute respiratory failure with hypoxia (HCC)    • Acute sinusitis    • SYLVAIN (acute kidney injury) (HCC)     on CKD   • Anemia    • Arthritis    • CAD (coronary artery disease)    • Cancer (HCC)     skin   • Chronic combined systolic and diastolic congestive  heart failure (HCC)    • Chronic ischemic heart disease    • CKD (chronic kidney disease)    • COPD (chronic obstructive pulmonary disease) (HCC)    • Depression    • Diabetes mellitus type 2, uncontrolled, without complications    • Disease of thyroid gland    • Fatigue    • Frequent PVCs    • Heart attack (HCC)    • History of coronary artery disease     with remote history of bypass surgery in 2001   • History of transfusion    • Hyperglycemia    • Hyperlipidemia    • Hypertension    • Hypertensive encephalopathy    • Mental status change     Acute   • YAW on CPAP    • Pleural effusion    • Pneumonia    • RBBB (right bundle branch block)    • Screening for prostate cancer 2011   • Stroke (HCC)    • TIA (transient ischemic attack)    • Type 2 diabetes mellitus (HCC)    • Urinary retention    • Vitamin D deficiency        PAST SURGICAL HISTORY  Past Surgical History:   Procedure Laterality Date   • APPENDECTOMY N/A 02/14/2016    Dr. Alexey Dodson   • COLONOSCOPY      over 20 years ago.  no polyps    • COLONOSCOPY N/A 9/18/2018    Procedure: COLONOSCOPY;  Surgeon: Jose Enrique Louie MD;  Location: [facility] ENDOSCOPY;  Service: Gastroenterology   • COLONOSCOPY N/A 9/19/2018    IH, EH, polyps (TAs w/low grade dysplasia)   • COLONOSCOPY N/A 6/1/2020    Procedure: COLONOSCOPY;  Surgeon: Jose Enrique Louie MD;  Location: [facility] ENDOSCOPY;  Service: Gastroenterology;  Laterality: N/A;  Pre op-Anemia, Melena, History of Polyps  Post op-To Cecum/TI, Polyp, Poor Prep   • CORONARY ARTERY BYPASS GRAFT  11/2001   • ENDOSCOPY N/A 5/29/2020    Procedure: ESOPHAGOGASTRODUODENOSCOPY with biopsies;  Surgeon: Amanda Lovelace MD;  Location: [facility] ENDOSCOPY;  Service: Gastroenterology;  Laterality: N/A;  pre-anemia, dark stools  post-esophagitis, hiatal hernia   • ENDOSCOPY N/A 9/15/2020    Procedure: ESOPHAGOGASTRODUODENOSCOPY WITH BIOPSIES;  Surgeon: Jose Enrique Louie MD;  Location: [facility] ENDOSCOPY;  Service:  Gastroenterology;  Laterality: N/A;  pre: history of melena and esophagitis  post: mild esophagitis and gastritis, small hiatal hernia   • HERNIA REPAIR Left 2019   • TONSILLECTOMY     • VASECTOMY         FAMILY HISTORY  Family History   Problem Relation Age of Onset   • Diabetes Mother    • Hypertension Mother    • Macular degeneration Mother    • Alcohol abuse Father    • Cancer Father         lung,  age 65   • Heart disease Father    • Alcohol abuse Brother    • Cirrhosis Brother          age 50   • Liver cancer Brother 45   • Diabetes Son    • Kidney failure Son    • Autism Son        SOCIAL HISTORY  Social History     Socioeconomic History   • Marital status:      Spouse name: Lissette   • Number of children: 3   • Years of education: college   Tobacco Use   • Smoking status: Never Smoker   • Smokeless tobacco: Never Used   • Tobacco comment: daily caffeine   Vaping Use   • Vaping Use: Never used   Substance and Sexual Activity   • Alcohol use: No   • Drug use: No   • Sexual activity: Defer       CURRENT MEDICATIONS  Prior to Admission medications    Medication Sig Start Date End Date Taking? Authorizing Provider   acetaminophen (TYLENOL) 325 MG tablet Take 2 tablets by mouth Every 4 (Four) Hours As Needed for Mild Pain  or Fever (Temperature greater than or equal to 37 C). 21   Delmar Carmichael MD   apixaban (ELIQUIS) 2.5 MG tablet tablet Take 1 tablet by mouth Every 12 (Twelve) Hours. Indications: Other - full anticoagulation 21   Flo Sherman MD   aspirin 81 MG EC tablet Take 81 mg by mouth Daily.    Alonzo Dean MD   atorvastatin (LIPITOR) 80 MG tablet Take 1 tablet by mouth Every Night. 21   Delmar Carmichael MD   buPROPion XL (WELLBUTRIN XL) 150 MG 24 hr tablet TAKE ONE TABLET BY MOUTH EVERY MORNING 21   ProviderAlonzo MD   carvedilol (COREG) 25 MG tablet Take 0.5 tablets by mouth 2 (Two) Times a Day With Meals. 21    Flo Sherman MD   Cholecalciferol (VITAMIN D3) 5000 units capsule capsule Take 5,000 Units by mouth Daily.    Alonzo Dean MD   docusate sodium 100 MG capsule Take 100 mg by mouth. 6/2/21 6/2/22  Alonzo Dean MD   ferrous sulfate 325 (65 FE) MG tablet Take 1 tablet by mouth Daily With Breakfast & Dinner. 7/27/21   Kim Hoyos MD PhD   folic acid (FOLVITE) 1 MG tablet TAKE ONE TABLET BY MOUTH DAILY 2/9/21   Kim Hoyos MD PhD   Insulin Glargine (BASAGLAR KWIKPEN) 100 UNIT/ML injection pen Inject 4 Units under the skin into the appropriate area as directed Every Night. Patient says he does not take consistently 5/24/21   Delmar Carmichael MD   insulin glulisine (APIDRA) 100 UNIT/ML injection Inject 2 Units under the skin into the appropriate area as directed 3 (Three) Times a Day Before Meals. Patient says he does not take consistently    Alonzo Dean MD   levothyroxine (SYNTHROID, LEVOTHROID) 75 MCG tablet TAKE ONE TABLET BY MOUTH DAILY 6/8/21   Alonzo Dean MD   melatonin 3 MG tablet Take 1 tablet by mouth Every Night. 5/24/21   Delmar Carmichael MD   sennosides-docusate (PERICOLACE) 8.6-50 MG per tablet Take 2 tablets by mouth 2 (Two) Times a Day. 12/22/21   Flo Sherman MD   sodium zirconium cyclosilicate (LOKELMA) 10 g pack Mix contents of one packet and take by mouth Daily. 12/23/21   Flo Sherman MD   tamsulosin (FLOMAX) 0.4 MG capsule 24 hr capsule Take 1 capsule by mouth Every Night.    Alonzo Dean MD   torsemide (DEMADEX) 20 MG tablet Take 3 tablets by mouth Daily. 12/23/21   Flo Sherman MD   vitamin B-12 (CYANOCOBALAMIN) 1000 MCG tablet TAKE ONE TABLET BY MOUTH DAILY 2/9/21   Kim Hoyos MD PhD       ALLERGIES  Prozac [fluoxetine hcl]    REVIEW OF SYSTEMS  All systems reviewed and negative except for those discussed in HPI.     PHYSICAL EXAM  ED Triage Vitals [02/01/22 1251]   Temp Heart Rate Resp BP  SpO2   96 °F (35.6 °C) 56 16 169/91 99 %      Temp src Heart Rate Source Patient Position BP Location FiO2 (%)   Tympanic Monitor Sitting Left arm --       Physical Exam  Constitutional:       Appearance: He is ill-appearing.   HENT:      Head: Normocephalic and atraumatic.      Mouth/Throat:      Mouth: Mucous membranes are moist.   Eyes:      Extraocular Movements: Extraocular movements intact.      Pupils: Pupils are equal, round, and reactive to light.   Cardiovascular:      Rate and Rhythm: Regular rhythm. Bradycardia present.   Pulmonary:      Effort: Pulmonary effort is normal.      Breath sounds: Normal breath sounds.   Abdominal:      General: There is no distension.      Palpations: Abdomen is soft.      Tenderness: There is no abdominal tenderness.   Musculoskeletal:      Cervical back: Normal range of motion.   Skin:     General: Skin is warm and dry.      Coloration: Skin is pale.   Neurological:      General: No focal deficit present.      Mental Status: He is alert and oriented to person, place, and time.   Psychiatric:         Mood and Affect: Mood normal.         Behavior: Behavior normal.         Thought Content: Thought content normal.         Judgment: Judgment normal.         PROCEDURES  Procedures  None    LABS  Recent Results (from the past 24 hour(s))   Comprehensive Metabolic Panel    Collection Time: 02/01/22  3:19 PM    Specimen: Blood   Result Value Ref Range    Glucose 227 (H) 65 - 99 mg/dL    BUN 62 (H) 8 - 23 mg/dL    Creatinine 3.43 (H) 0.76 - 1.27 mg/dL    Sodium 140 136 - 145 mmol/L    Potassium 3.9 3.5 - 5.2 mmol/L    Chloride 102 98 - 107 mmol/L    CO2 26.0 22.0 - 29.0 mmol/L    Calcium 9.1 8.6 - 10.5 mg/dL    Total Protein 6.1 6.0 - 8.5 g/dL    Albumin 3.50 3.50 - 5.20 g/dL    ALT (SGPT) 23 1 - 41 U/L    AST (SGOT) 15 1 - 40 U/L    Alkaline Phosphatase 90 39 - 117 U/L    Total Bilirubin 0.2 0.0 - 1.2 mg/dL    eGFR Non African Amer 18 (L) >60 mL/min/1.73    Globulin 2.6 gm/dL     A/G Ratio 1.3 g/dL    BUN/Creatinine Ratio 18.1 7.0 - 25.0    Anion Gap 12.0 5.0 - 15.0 mmol/L   TSH    Collection Time: 02/01/22  3:19 PM    Specimen: Blood   Result Value Ref Range    TSH 1.280 0.270 - 4.200 uIU/mL   T4, Free    Collection Time: 02/01/22  3:19 PM    Specimen: Blood   Result Value Ref Range    Free T4 1.39 0.93 - 1.70 ng/dL   BNP    Collection Time: 02/01/22  3:19 PM    Specimen: Blood   Result Value Ref Range    proBNP 3,346.0 (H) 0.0 - 900.0 pg/mL   CBC Auto Differential    Collection Time: 02/01/22  3:19 PM    Specimen: Blood   Result Value Ref Range    WBC 6.28 3.40 - 10.80 10*3/mm3    RBC 4.57 4.14 - 5.80 10*6/mm3    Hemoglobin 12.0 (L) 13.0 - 17.7 g/dL    Hematocrit 37.2 (L) 37.5 - 51.0 %    MCV 81.4 79.0 - 97.0 fL    MCH 26.3 (L) 26.6 - 33.0 pg    MCHC 32.3 31.5 - 35.7 g/dL    RDW 14.0 12.3 - 15.4 %    RDW-SD 40.5 37.0 - 54.0 fl    MPV 10.7 6.0 - 12.0 fL    Platelets 204 140 - 450 10*3/mm3    Neutrophil % 67.9 42.7 - 76.0 %    Lymphocyte % 19.4 (L) 19.6 - 45.3 %    Monocyte % 9.7 5.0 - 12.0 %    Eosinophil % 2.5 0.3 - 6.2 %    Basophil % 0.2 0.0 - 1.5 %    Immature Grans % 0.3 0.0 - 0.5 %    Neutrophils, Absolute 4.26 1.70 - 7.00 10*3/mm3    Lymphocytes, Absolute 1.22 0.70 - 3.10 10*3/mm3    Monocytes, Absolute 0.61 0.10 - 0.90 10*3/mm3    Eosinophils, Absolute 0.16 0.00 - 0.40 10*3/mm3    Basophils, Absolute 0.01 0.00 - 0.20 10*3/mm3    Immature Grans, Absolute 0.02 0.00 - 0.05 10*3/mm3    nRBC 0.0 0.0 - 0.2 /100 WBC   Troponin    Collection Time: 02/01/22  3:19 PM    Specimen: Blood   Result Value Ref Range    Troponin T 0.106 (C) 0.000 - 0.030 ng/mL   Respiratory Panel PCR w/COVID-19(SARS-CoV-2) SUKUMAR/TERRELL/RAISSA/PAD/COR/MAD/ARNALDO In-House, NP Swab in [facility], 3-4 HR TAT - Swab, Nasopharynx    Collection Time: 02/01/22  3:21 PM    Specimen: Nasopharynx; Swab   Result Value Ref Range    ADENOVIRUS, PCR Not Detected Not Detected    Coronavirus 229E Not Detected Not Detected    Coronavirus HKU1 Not  Detected Not Detected    Coronavirus NL63 Not Detected Not Detected    Coronavirus OC43 Not Detected Not Detected    COVID19 Not Detected Not Detected - Ref. Range    Human Metapneumovirus Not Detected Not Detected    Human Rhinovirus/Enterovirus Not Detected Not Detected    Influenza A PCR Not Detected Not Detected    Influenza B PCR Not Detected Not Detected    Parainfluenza Virus 1 Not Detected Not Detected    Parainfluenza Virus 2 Not Detected Not Detected    Parainfluenza Virus 3 Not Detected Not Detected    Parainfluenza Virus 4 Not Detected Not Detected    RSV, PCR Not Detected Not Detected    Bordetella pertussis pcr Not Detected Not Detected    Bordetella parapertussis PCR Not Detected Not Detected    Chlamydophila pneumoniae PCR Not Detected Not Detected    Mycoplasma pneumo by PCR Not Detected Not Detected   Urinalysis With Microscopic If Indicated (No Culture) - Urine, Clean Catch    Collection Time: 02/01/22  3:28 PM    Specimen: Urine, Clean Catch   Result Value Ref Range    Color, UA Yellow Yellow, Straw    Appearance, UA Clear Clear    pH, UA 5.5 5.0 - 8.0    Specific Gravity, UA 1.010 1.005 - 1.030    Glucose,  mg/dL (Trace) (A) Negative    Ketones, UA Negative Negative    Bilirubin, UA Negative Negative    Blood, UA Trace (A) Negative    Protein, UA >=300 mg/dL (3+) (A) Negative    Leuk Esterase, UA Negative Negative    Nitrite, UA Negative Negative    Urobilinogen, UA 0.2 E.U./dL 0.2 - 1.0 E.U./dL   Urinalysis, Microscopic Only - Urine, Clean Catch    Collection Time: 02/01/22  3:28 PM    Specimen: Urine, Clean Catch   Result Value Ref Range    RBC, UA 0-2 None Seen, 0-2 /HPF    WBC, UA 0-2 None Seen, 0-2 /HPF    Bacteria, UA None Seen None Seen /HPF    Squamous Epithelial Cells, UA 0-2 None Seen, 0-2 /HPF    Hyaline Casts, UA 3-6 None Seen /LPF    Methodology Automated Microscopy    ECG 12 Lead    Collection Time: 02/01/22  3:35 PM   Result Value Ref Range    QT Interval 491 ms   Troponin     Collection Time: 02/01/22  5:33 PM    Specimen: Blood   Result Value Ref Range    Troponin T 0.099 (C) 0.000 - 0.030 ng/mL   POC Glucose Once    Collection Time: 02/01/22 10:39 PM    Specimen: Blood   Result Value Ref Range    Glucose 153 (H) 70 - 130 mg/dL       RADIOLOGY  XR Chest 1 View   Final Result          MEDICATIONS GIVEN IN THE ER  Medications   apixaban (ELIQUIS) tablet 2.5 mg (2.5 mg Oral Given 2/1/22 2239)   carvedilol (COREG) tablet 12.5 mg (12.5 mg Oral Given 2/1/22 2346)   insulin lispro (ADMELOG) injection 2 Units (has no administration in time range)   aspirin EC tablet 81 mg (has no administration in time range)   atorvastatin (LIPITOR) tablet 80 mg (80 mg Oral Given 2/1/22 2239)   torsemide (DEMADEX) tablet 60 mg (has no administration in time range)   insulin glargine (LANTUS, SEMGLEE) injection 4 Units (4 Units Subcutaneous Given 2/1/22 2239)   ferrous sulfate tablet 325 mg (325 mg Oral Given 2/1/22 2239)   tamsulosin (FLOMAX) 24 hr capsule 0.4 mg (0.4 mg Oral Given 2/1/22 2239)   levothyroxine (SYNTHROID, LEVOTHROID) tablet 75 mcg (has no administration in time range)   folic acid (FOLVITE) tablet 1,000 mcg (has no administration in time range)   buPROPion XL (WELLBUTRIN XL) 24 hr tablet 150 mg (has no administration in time range)   sodium chloride 0.9 % flush 10 mL (10 mL Intravenous Given 2/1/22 2240)   sodium chloride 0.9 % flush 10 mL (has no administration in time range)   dextrose (GLUTOSE) oral gel 15 g (has no administration in time range)   dextrose (D50W) (25 g/50 mL) IV injection 25 g (has no administration in time range)   glucagon (human recombinant) (GLUCAGEN DIAGNOSTIC) injection 1 mg (has no administration in time range)   insulin lispro (ADMELOG) injection 0-9 Units (has no administration in time range)   labetalol (NORMODYNE,TRANDATE) injection 10 mg (10 mg Intravenous Given 2/1/22 1859)   hydrALAZINE (APRESOLINE) injection 10 mg (10 mg Intravenous Given 2/1/22 2038)        MEDICAL DECISION MAKING, CONSULTS AND PROGRESS NOTES  All labs and all radiology studies were have been viewed and interpreted by me.   Discussion below represents my analysis of pertinent findings related to patient's condition, differential diagnosis, treatment plan and final disposition.    Differential diagnosis includes but is not limited to:  -COVID  -CHF  -acute coronary syndrome  -pulmonary embolism  -pneumothorax  -pneumonia  -asthma/COPD  -deconditioning  -anemia  -anxiety       PPE: The patient was placed in a face mask in first look. Patient was wearing facemask when I entered the room and throughout our encounter. I wore full protective equipment throughout this patient encounter including a face mask, eye protection and gloves. Hand hygiene was performed before donning protective equipment and after removal when leaving the room.    AS OF 00:35 EST VITALS:    BP - 154/77  HR - 60  TEMP - 97.5 °F (36.4 °C) (Oral)  O2 SATS - 99%    ED Course as of 02/02/22 0035   Tue Feb 01, 2022 2030 I discussed the patient's care with XUAN Jones with the observation unit.  He is agreeable to admission. [AR]   2046 Current BP is 161/85. [AR]      ED Course User Index  [AR] Fabby Barnes PA     Based on the patient's lab findings and presenting symptoms, the doctor and I feel it is appropriate to admit the patient for further management, evaluation, and treatment.  I have discussed this with the admitting team.  I have also discussed this with the patient/family.  They are in agreement with admission.      DIAGNOSIS   Diagnosis Plan   1. Hypertension not at goal     2. Other fatigue     3. Noncompliance with medications         DISPOSITION  ED Disposition     ED Disposition Condition Comment    Decision to Admit          RX  Medications   apixaban (ELIQUIS) tablet 2.5 mg (2.5 mg Oral Given 2/1/22 2239)   carvedilol (COREG) tablet 12.5 mg (12.5 mg Oral Given 2/1/22 2346)   insulin lispro (ADMELOG)  injection 2 Units (has no administration in time range)   aspirin EC tablet 81 mg (has no administration in time range)   atorvastatin (LIPITOR) tablet 80 mg (80 mg Oral Given 2/1/22 2239)   torsemide (DEMADEX) tablet 60 mg (has no administration in time range)   insulin glargine (LANTUS, SEMGLEE) injection 4 Units (4 Units Subcutaneous Given 2/1/22 2239)   ferrous sulfate tablet 325 mg (325 mg Oral Given 2/1/22 2239)   tamsulosin (FLOMAX) 24 hr capsule 0.4 mg (0.4 mg Oral Given 2/1/22 2239)   levothyroxine (SYNTHROID, LEVOTHROID) tablet 75 mcg (has no administration in time range)   folic acid (FOLVITE) tablet 1,000 mcg (has no administration in time range)   buPROPion XL (WELLBUTRIN XL) 24 hr tablet 150 mg (has no administration in time range)   sodium chloride 0.9 % flush 10 mL (10 mL Intravenous Given 2/1/22 2240)   sodium chloride 0.9 % flush 10 mL (has no administration in time range)   dextrose (GLUTOSE) oral gel 15 g (has no administration in time range)   dextrose (D50W) (25 g/50 mL) IV injection 25 g (has no administration in time range)   glucagon (human recombinant) (GLUCAGEN DIAGNOSTIC) injection 1 mg (has no administration in time range)   insulin lispro (ADMELOG) injection 0-9 Units (has no administration in time range)   labetalol (NORMODYNE,TRANDATE) injection 10 mg (10 mg Intravenous Given 2/1/22 1859)   hydrALAZINE (APRESOLINE) injection 10 mg (10 mg Intravenous Given 2/1/22 2038)          Medication List      No changes were made to your prescriptions during this visit.       Provider Attestation:  I personally reviewed the past medical history, past surgical history, social history, family history, current medications and allergies as they appear in the chart. I reviewed the patient's history, physical, lab/imaging results and overall care with Dr. Rodriguez who is in agreement with the patient's treatment plan.    EMR Dragon/Transcription disclaimer:  Some of this encounter note is an electronic  transcription/translation of spoken language to printed text. The electronic translation of spoken language may permit erroneous, or at times, nonsensical words or phrases to be inadvertently transcribed; Although I have reviewed the note for such errors, some may still exist.    Provider note signed by:         Fabby Barnes PA  02/02/22 0035

## 2022-02-01 NOTE — ED NOTES
Called lab to change covid sample to a resp panel.  Marianne in lab will take care of it     Degonda, Janet, RN  02/01/22 8748

## 2022-02-02 PROBLEM — I10 HYPERTENSION NOT AT GOAL: Status: ACTIVE | Noted: 2022-02-02

## 2022-02-02 PROBLEM — I16.1 HYPERTENSIVE EMERGENCY: Status: RESOLVED | Noted: 2022-02-01 | Resolved: 2022-02-02

## 2022-02-02 LAB
ANION GAP SERPL CALCULATED.3IONS-SCNC: 11.7 MMOL/L (ref 5–15)
BUN SERPL-MCNC: 57 MG/DL (ref 8–23)
BUN/CREAT SERPL: 18.4 (ref 7–25)
CALCIUM SPEC-SCNC: 9.1 MG/DL (ref 8.6–10.5)
CHLORIDE SERPL-SCNC: 103 MMOL/L (ref 98–107)
CO2 SERPL-SCNC: 24.3 MMOL/L (ref 22–29)
CREAT SERPL-MCNC: 3.1 MG/DL (ref 0.76–1.27)
GFR SERPL CREATININE-BSD FRML MDRD: 20 ML/MIN/1.73
GLUCOSE BLDC GLUCOMTR-MCNC: 148 MG/DL (ref 70–130)
GLUCOSE BLDC GLUCOMTR-MCNC: 162 MG/DL (ref 70–130)
GLUCOSE BLDC GLUCOMTR-MCNC: 193 MG/DL (ref 70–130)
GLUCOSE BLDC GLUCOMTR-MCNC: 216 MG/DL (ref 70–130)
GLUCOSE BLDC GLUCOMTR-MCNC: 81 MG/DL (ref 70–130)
GLUCOSE SERPL-MCNC: 164 MG/DL (ref 65–99)
POTASSIUM SERPL-SCNC: 3.5 MMOL/L (ref 3.5–5.2)
QT INTERVAL: 482 MS
SODIUM SERPL-SCNC: 139 MMOL/L (ref 136–145)
TROPONIN T SERPL-MCNC: 0.1 NG/ML (ref 0–0.03)
TROPONIN T SERPL-MCNC: 0.11 NG/ML (ref 0–0.03)

## 2022-02-02 PROCEDURE — 82962 GLUCOSE BLOOD TEST: CPT

## 2022-02-02 PROCEDURE — 93010 ELECTROCARDIOGRAM REPORT: CPT | Performed by: INTERNAL MEDICINE

## 2022-02-02 PROCEDURE — 63710000001 INSULIN LISPRO (HUMAN) PER 5 UNITS: Performed by: HOSPITALIST

## 2022-02-02 PROCEDURE — 80048 BASIC METABOLIC PNL TOTAL CA: CPT | Performed by: NURSE PRACTITIONER

## 2022-02-02 PROCEDURE — 93005 ELECTROCARDIOGRAM TRACING: CPT | Performed by: NURSE PRACTITIONER

## 2022-02-02 PROCEDURE — 63710000001 INSULIN LISPRO (HUMAN) PER 5 UNITS: Performed by: NURSE PRACTITIONER

## 2022-02-02 PROCEDURE — 84484 ASSAY OF TROPONIN QUANT: CPT | Performed by: NURSE PRACTITIONER

## 2022-02-02 RX ORDER — CARVEDILOL 6.25 MG/1
6.25 TABLET ORAL 2 TIMES DAILY WITH MEALS
Status: DISCONTINUED | OUTPATIENT
Start: 2022-02-02 | End: 2022-02-03

## 2022-02-02 RX ORDER — FAMOTIDINE 20 MG/1
20 TABLET, FILM COATED ORAL 2 TIMES DAILY
Status: DISCONTINUED | OUTPATIENT
Start: 2022-02-02 | End: 2022-02-04 | Stop reason: HOSPADM

## 2022-02-02 RX ORDER — SODIUM CHLORIDE 9 MG/ML
75 INJECTION, SOLUTION INTRAVENOUS CONTINUOUS
Status: DISCONTINUED | OUTPATIENT
Start: 2022-02-02 | End: 2022-02-02

## 2022-02-02 RX ORDER — LABETALOL HYDROCHLORIDE 5 MG/ML
10 INJECTION, SOLUTION INTRAVENOUS ONCE
Status: DISCONTINUED | OUTPATIENT
Start: 2022-02-02 | End: 2022-02-02

## 2022-02-02 RX ORDER — ATORVASTATIN CALCIUM 20 MG/1
10 TABLET, FILM COATED ORAL NIGHTLY
Status: DISCONTINUED | OUTPATIENT
Start: 2022-02-02 | End: 2022-02-04 | Stop reason: HOSPADM

## 2022-02-02 RX ORDER — INSULIN LISPRO 100 [IU]/ML
0-9 INJECTION, SOLUTION INTRAVENOUS; SUBCUTANEOUS
Status: DISCONTINUED | OUTPATIENT
Start: 2022-02-02 | End: 2022-02-04 | Stop reason: HOSPADM

## 2022-02-02 RX ORDER — HYDRALAZINE HYDROCHLORIDE 20 MG/ML
10 INJECTION INTRAMUSCULAR; INTRAVENOUS ONCE
Status: DISCONTINUED | OUTPATIENT
Start: 2022-02-02 | End: 2022-02-02

## 2022-02-02 RX ORDER — TORSEMIDE 20 MG/1
20 TABLET ORAL
Status: DISCONTINUED | OUTPATIENT
Start: 2022-02-02 | End: 2022-02-04 | Stop reason: HOSPADM

## 2022-02-02 RX ORDER — ONDANSETRON 2 MG/ML
4 INJECTION INTRAMUSCULAR; INTRAVENOUS EVERY 6 HOURS PRN
Status: DISCONTINUED | OUTPATIENT
Start: 2022-02-02 | End: 2022-02-04

## 2022-02-02 RX ADMIN — INSULIN LISPRO 4 UNITS: 100 INJECTION, SOLUTION INTRAVENOUS; SUBCUTANEOUS at 21:06

## 2022-02-02 RX ADMIN — TORSEMIDE 60 MG: 20 TABLET ORAL at 09:06

## 2022-02-02 RX ADMIN — SODIUM CHLORIDE, PRESERVATIVE FREE 10 ML: 5 INJECTION INTRAVENOUS at 09:08

## 2022-02-02 RX ADMIN — SODIUM CHLORIDE 75 ML/HR: 9 INJECTION, SOLUTION INTRAVENOUS at 03:49

## 2022-02-02 RX ADMIN — TORSEMIDE 20 MG: 20 TABLET ORAL at 17:46

## 2022-02-02 RX ADMIN — INSULIN GLARGINE-YFGN 10 UNITS: 100 INJECTION, SOLUTION SUBCUTANEOUS at 21:08

## 2022-02-02 RX ADMIN — TAMSULOSIN HYDROCHLORIDE 0.4 MG: 0.4 CAPSULE ORAL at 21:06

## 2022-02-02 RX ADMIN — APIXABAN 2.5 MG: 2.5 TABLET, FILM COATED ORAL at 21:06

## 2022-02-02 RX ADMIN — INSULIN LISPRO 2 UNITS: 100 INJECTION, SOLUTION INTRAVENOUS; SUBCUTANEOUS at 09:04

## 2022-02-02 RX ADMIN — APIXABAN 2.5 MG: 2.5 TABLET, FILM COATED ORAL at 09:02

## 2022-02-02 RX ADMIN — LEVOTHYROXINE SODIUM 75 MCG: 0.07 TABLET ORAL at 06:42

## 2022-02-02 RX ADMIN — BUPROPION HYDROCHLORIDE 150 MG: 150 TABLET, EXTENDED RELEASE ORAL at 09:02

## 2022-02-02 RX ADMIN — INSULIN LISPRO 2 UNITS: 100 INJECTION, SOLUTION INTRAVENOUS; SUBCUTANEOUS at 12:22

## 2022-02-02 RX ADMIN — FAMOTIDINE 20 MG: 20 TABLET, FILM COATED ORAL at 21:06

## 2022-02-02 RX ADMIN — FOLIC ACID 1000 MCG: 1 TABLET ORAL at 09:02

## 2022-02-02 RX ADMIN — CARVEDILOL 12.5 MG: 12.5 TABLET, FILM COATED ORAL at 12:24

## 2022-02-02 RX ADMIN — CARVEDILOL 6.25 MG: 6.25 TABLET, FILM COATED ORAL at 17:46

## 2022-02-02 RX ADMIN — ASPIRIN 81 MG: 81 TABLET, COATED ORAL at 09:02

## 2022-02-02 RX ADMIN — ATORVASTATIN CALCIUM 10 MG: 20 TABLET, FILM COATED ORAL at 21:06

## 2022-02-02 RX ADMIN — FERROUS SULFATE TAB 325 MG (65 MG ELEMENTAL FE) 325 MG: 325 (65 FE) TAB at 09:02

## 2022-02-02 RX ADMIN — INSULIN LISPRO 2 UNITS: 100 INJECTION, SOLUTION INTRAVENOUS; SUBCUTANEOUS at 09:03

## 2022-02-02 NOTE — ED NOTES
Pt in mask throughout encounter. This ERT was in appropriate ppe.       Nik Gorman, PCT  02/01/22 1924

## 2022-02-02 NOTE — PLAN OF CARE
Problem: Fall Injury Risk  Goal: Absence of Fall and Fall-Related Injury  Outcome: Ongoing, Not Progressing  Intervention: Identify and Manage Contributors to Fall Injury Risk  Recent Flowsheet Documentation  Taken 2/2/2022 0352 by Nicole Harris RN  Medication Review/Management:   medications reviewed   high-risk medications identified  Taken 2/2/2022 0011 by Nicole Harris RN  Medication Review/Management:   medications reviewed   high-risk medications identified  Self-Care Promotion: independence encouraged  Taken 2/1/2022 2204 by Nicole Harris RN  Medication Review/Management:   medications reviewed   high-risk medications identified  Intervention: Promote Injury-Free Environment  Recent Flowsheet Documentation  Taken 2/2/2022 0352 by Nicole Harris RN  Safety Promotion/Fall Prevention:   fall prevention program maintained   assistive device/personal items within reach   activity supervised   safety round/check completed   room organization consistent   nonskid shoes/slippers when out of bed   muscle strengthening facilitated  Taken 2/2/2022 0200 by Nicole Harris RN  Safety Promotion/Fall Prevention:   fall prevention program maintained   mobility aid in reach   safety round/check completed   room organization consistent  Taken 2/2/2022 0011 by Nicole Harris RN  Safety Promotion/Fall Prevention:   activity supervised   lighting adjusted   fall prevention program maintained   clutter free environment maintained   nonskid shoes/slippers when out of bed   room organization consistent   safety round/check completed  Taken 2/1/2022 2204 by Nicole Harris RN  Safety Promotion/Fall Prevention: activity supervised   Goal Outcome Evaluation:  Plan of Care Reviewed With: patient

## 2022-02-02 NOTE — CONSULTS
Internal medicine consult    Referring physician   Dr. Baltazar    Chief complaint  Fatigue tired  Generalized weakness    Reason for consult  Follow medical problems and take over the care    History of present illness  66-year-old white male with very complex past medical history including diabetes hypertension hyperlipidemia chronic kidney disease and patent foramen ovale on anticoagulation admitted through emergency room with fatigue tired generalized weakness.  Patient has increased shortness of breath chest tightness cough for last 3 to 4 days.  Patient denies any fever chills abdominal pain nausea vomiting diarrhea.  Patient ran out of medication for last 1 week.  Patient admitted to the observation unit due to hypertensive urgency and blood pressure stabilized but continued to have symptoms admitted to the monitored bed and I am asked to follow medical problems and take over the care.    PAST MEDICAL HISTORY  Diabetes mellitus  Hypertension  Hyperlipidemia   Hypothyroidism  Coronary artery disease  Congestive heart failure  Depression  BPH  History of CVA on anticoagulation  Patent foramen ovale  Obstructive sleep apnea  Chronic kidney disease stage IV  Gastroesophageal reflux disease     PAST SURGICAL HISTORY              Procedure Laterality Date   • APPENDECTOMY N/A 02/14/2016     Dr. Alexey Dodson   • COLONOSCOPY         over 20 years ago.  no polyps    • COLONOSCOPY N/A 9/18/2018     Procedure: COLONOSCOPY;  Surgeon: Jose Enrique Louie MD;  Location: [facility] ENDOSCOPY;  Service: Gastroenterology   • COLONOSCOPY N/A 9/19/2018     IH, EH, polyps (TAs w/low grade dysplasia)   • COLONOSCOPY N/A 6/1/2020     Procedure: COLONOSCOPY;  Surgeon: Jose Enrique Louie MD;  Location: [facility] ENDOSCOPY;  Service: Gastroenterology;  Laterality: N/A;  Pre op-Anemia, Melena, History of Polyps  Post op-To Cecum/TI, Polyp, Poor Prep   • CORONARY ARTERY BYPASS GRAFT   11/2001   • ENDOSCOPY N/A 5/29/2020     Procedure:  "ESOPHAGOGASTRODUODENOSCOPY with biopsies;  Surgeon: Amanda Lovelace MD;  Location: [facility] ENDOSCOPY;  Service: Gastroenterology;  Laterality: N/A;  pre-anemia, dark stools  post-esophagitis, hiatal hernia   • ENDOSCOPY N/A 9/15/2020     Procedure: ESOPHAGOGASTRODUODENOSCOPY WITH BIOPSIES;  Surgeon: Jose Enrique Louie MD;  Location: [facility] ENDOSCOPY;  Service: Gastroenterology;  Laterality: N/A;  pre: history of melena and esophagitis  post: mild esophagitis and gastritis, small hiatal hernia   • HERNIA REPAIR Left 2019   • TONSILLECTOMY       • VASECTOMY             FAMILY HISTORY           Problem Relation Age of Onset   • Diabetes Mother     • Hypertension Mother     • Macular degeneration Mother     • Alcohol abuse Father     • Cancer Father           lung,  age 65   • Heart disease Father     • Alcohol abuse Brother     • Cirrhosis Brother            age 50   • Liver cancer Brother 45   • Diabetes Son     • Kidney failure Son     • Autism Son        SOCIAL HISTORY                 Socioeconomic History   • Marital status:        Spouse name: Lissette   • Number of children: 3   • Years of education: college   Tobacco Use   • Smoking status: Never Smoker   • Smokeless tobacco: Never Used   • Tobacco comment: daily caffeine   Vaping Use   • Vaping Use: Never used   Substance and Sexual Activity   • Alcohol use: No   • Drug use: No   • Sexual activity: Defer         ALLERGIES  Prozac [fluoxetine hcl]  Home medications reviewed     REVIEW OF SYSTEMS  All systems reviewed and negative except for those discussed in HPI.      PHYSICAL EXAM  Blood pressure 138/69, pulse 55, temperature 97.4 °F (36.3 °C), temperature source Oral, resp. rate 18, height 182.9 cm (72\"), weight 81.6 kg (180 lb), SpO2 100 %.    Constitutional:       Appearance: He is ill-appearing.   HENT:      Head: Normocephalic and atraumatic.      Mouth/Throat:      Mouth: Mucous membranes are moist.   Eyes:      Extraocular " Movements: Extraocular movements intact.      Pupils: Pupils are equal, round, and reactive to light.   Cardiovascular:      Rate and Rhythm: Regular rhythm. Bradycardia present.   Pulmonary:      Effort: Pulmonary effort is normal.      Breath sounds: Normal breath sounds.   Abdominal:      General: There is no distension.      Palpations: Abdomen is soft.      Tenderness: There is no abdominal tenderness.   Musculoskeletal:      Cervical back: Normal range of motion.   Skin:     General: Skin is warm and dry.      Coloration: Skin is pale.   Neurological:      General: No focal deficit present.      Mental Status: He is alert and oriented to person, place, and time.   Psychiatric:         Mood and Affect: Mood normal.         Behavior: Behavior normal.         Thought Content: Thought content normal.         Judgment: Judgment normal.      LABS  Lab Results (last 24 hours)     Procedure Component Value Units Date/Time    POC Glucose Once [[id number]]  (Normal) Collected: 02/02/22 1615    Specimen: Blood Updated: 02/02/22 1616     Glucose 81 mg/dL      Comment: Meter: [id number] : [id number] Chalino RAMIREZ       POC Glucose Once [[id number]]  (Abnormal) Collected: 02/02/22 1130    Specimen: Blood Updated: 02/02/22 1133     Glucose 193 mg/dL      Comment: Meter: [id number] : [id number] Chalino RAMIREZ       Troponin [[id number]]  (Abnormal) Collected: 02/02/22 0642    Specimen: Blood Updated: 02/02/22 0855     Troponin T 0.098 ng/mL     Narrative:      Troponin T Reference Range:  <= 0.03 ng/mL-   Negative for AMI  >0.03 ng/mL-     Abnormal for myocardial necrosis.  Clinicians would have to utilize clinical acumen, EKG, Troponin and serial changes to determine if it is an Acute Myocardial Infarction or myocardial injury due to an underlying chronic condition.       Results may be falsely decreased if patient taking Biotin.      POC Glucose Once [[id number]]  (Abnormal) Collected: 02/02/22 0834     Specimen: Blood Updated: 02/02/22 0836     Glucose 162 mg/dL      Comment: Meter: [id number] : [id number] Chalino Gil JAMES       Troponin [[id number]]  (Abnormal) Collected: 02/02/22 0348    Specimen: Blood Updated: 02/02/22 0439     Troponin T 0.108 ng/mL     Narrative:      Troponin T Reference Range:  <= 0.03 ng/mL-   Negative for AMI  >0.03 ng/mL-     Abnormal for myocardial necrosis.  Clinicians would have to utilize clinical acumen, EKG, Troponin and serial changes to determine if it is an Acute Myocardial Infarction or myocardial injury due to an underlying chronic condition.       Results may be falsely decreased if patient taking Biotin.      Basic Metabolic Panel [[id number]]  (Abnormal) Collected: 02/02/22 0348    Specimen: Blood Updated: 02/02/22 0434     Glucose 164 mg/dL      BUN 57 mg/dL      Creatinine 3.10 mg/dL      Sodium 139 mmol/L      Potassium 3.5 mmol/L      Chloride 103 mmol/L      CO2 24.3 mmol/L      Calcium 9.1 mg/dL      eGFR Non African Amer 20 mL/min/1.73      BUN/Creatinine Ratio 18.4     Anion Gap 11.7 mmol/L     Narrative:      GFR Normal >60  Chronic Kidney Disease <60  Kidney Failure <15      POC Glucose Once [[id number]]  (Abnormal) Collected: 02/02/22 0337    Specimen: Blood Updated: 02/02/22 0344     Glucose 148 mg/dL      Comment: Meter: [id number] : [id number] Hebert Mariano RN       POC Glucose Once [[id number]]  (Abnormal) Collected: 02/01/22 2239    Specimen: Blood Updated: 02/01/22 2241     Glucose 153 mg/dL      Comment: Meter: [id number] : [id number] Beata Vines RN       Troponin [[id number]]  (Abnormal) Collected: 02/01/22 1733    Specimen: Blood Updated: 02/01/22 1804     Troponin T 0.099 ng/mL     Narrative:      Troponin T Reference Range:  <= 0.03 ng/mL-   Negative for AMI  >0.03 ng/mL-     Abnormal for myocardial necrosis.  Clinicians would have to utilize clinical acumen, EKG, Troponin and serial changes to determine if it is an Acute Myocardial Infarction  or myocardial injury due to an underlying chronic condition.       Results may be falsely decreased if patient taking Biotin.             XR CHEST 1 VW-     Clinical: Fatigue     COMPARISON examination 12/15/2021     FINDINGS: Median sternotomy wires and vascular markers in position.  Cardiac size upper limits of normal. No pleural effusion, vascular  congestion or acute airspace disease is demonstrated.     CONCLUSION: No active disease of the chest     This report was finalized on 2/1/2022 3:27 PM by Dr. Bryan Burnham          ECG 12 Lead  Component   Ref Range & Units 2/2/22 0342 2/1/22 1535 12/15/21 1248 5/20/21 2206   QT Interval   ms 482  491  511  469              HEART RATE= 60  bpm  RR Interval= 1000  ms  CT Interval= 202  ms  P Horizontal Axis= -3  deg  P Front Axis= 29  deg  QRSD Interval= 155  ms  QT Interval= 482  ms  QRS Axis= -76  deg  T Wave Axis= 80  deg  - ABNORMAL ECG -  Sinus rhythm  Right bundle branch block             Current Facility-Administered Medications:   •  apixaban (ELIQUIS) tablet 2.5 mg, 2.5 mg, Oral, Q12H, Qadah, Abdalhakim, APRN, 2.5 mg at 02/02/22 0902  •  aspirin EC tablet 81 mg, 81 mg, Oral, Daily, Qadah, Abdalhakim, APRN, 81 mg at 02/02/22 0902  •  atorvastatin (LIPITOR) tablet 80 mg, 80 mg, Oral, Nightly, Qadah, Abdalhakim, APRN, 80 mg at 02/01/22 2239  •  buPROPion XL (WELLBUTRIN XL) 24 hr tablet 150 mg, 150 mg, Oral, QAM, Qadah, Abdalhakim, APRN, 150 mg at 02/02/22 0902  •  carvedilol (COREG) tablet 6.25 mg, 6.25 mg, Oral, BID With Meals, Josue Pickens MD  •  dextrose (D50W) (25 g/50 mL) IV injection 25 g, 25 g, Intravenous, Q15 Min PRN, Qadah, Abdalhakim, APRN  •  dextrose (GLUTOSE) oral gel 15 g, 15 g, Oral, Q15 Min PRN, Qadah, Abdalhakim, APRN  •  glucagon (human recombinant) (GLUCAGEN DIAGNOSTIC) injection 1 mg, 1 mg, Subcutaneous, Q15 Min PRN, Juan Eaton, JI  •  insulin glargine (LANTUS, SEMGLEE) injection 10 Units, 10 Units, Subcutaneous, Nightly, Jassmed,  MD Ora  •  insulin lispro (ADMELOG) injection 0-9 Units, 0-9 Units, Subcutaneous, 4x Daily With Meals & Nightly, Ora Pickens MD  •  levothyroxine (SYNTHROID, LEVOTHROID) tablet 75 mcg, 75 mcg, Oral, Daily, Qadah, Abdalhakim, APRN, 75 mcg at 02/02/22 0642  •  ondansetron (ZOFRAN) injection 4 mg, 4 mg, Intravenous, Q6H PRN, Qadah, Abdalhakim, APRN  •  sodium chloride 0.9 % flush 10 mL, 10 mL, Intravenous, PRN, Qadah, Abdalhakim, APRN  •  sodium chloride 0.9 % infusion, 75 mL/hr, Intravenous, Continuous, Qadah, Abdalhakim, APRN, Last Rate: 75 mL/hr at 02/02/22 0349, 75 mL/hr at 02/02/22 0349  •  tamsulosin (FLOMAX) 24 hr capsule 0.4 mg, 0.4 mg, Oral, Nightly, Qadah, Abdalhakim, APRN, 0.4 mg at 02/01/22 2239  •  torsemide (DEMADEX) tablet 60 mg, 60 mg, Oral, Daily, Qadah, Abdalhakim, APRN, 60 mg at 02/02/22 0906    ASSESSMENT  Hypertensive urgency  Elevated troponin with known coronary artery disease  Congestive heart failure  Diabetes mellitus  Hypertension  Hyperlipidemia  Chronic kidney disease  Gastroesophageal reflux disease  Noncompliance with medications    PLAN  Agree with current care  Stabilize blood pressure  Serial cardiac enzymes and EKG  Repeat 2D echo  Accu-Cheks sliding scale insulin  Nephrology consult  Cardiology to follow patient  Adjust home medications  Stress ulcer DVT prophylaxis  Supportive care  Patient is full code  Discussed with nursing staff  Follow closely and further recommendation according to hospital course    ORA PICKENS MD

## 2022-02-02 NOTE — H&P
.   [facility]   HISTORY AND PHYSICAL    Patient Name: Carlito Mo  : 1955  MRN: [id number]  Primary Care Physician:  Florencia Caballero MD  Date of admission: 2022    Subjective   Subjective     Chief Complaint: Fatigue    HPI:    Carlito Mo is a 66 y.o. male robust medical history of hypertension, upper thyroidism, hyperlipidemia, CHF, CKD stage IV, DM 2, chronic anticoagulation with history of CVA, anemia, CAD, YAW and COPD presents to [facility] emergency department complaining of fatigue and generalized weakness.  Patient reports generalized weakness and fatigue for the past 3 to 4 days.  Nothing improves or worsen symptoms.  Patient was found to be hypertensive and was medicated with 10 mg labetalol which was not effective and was given 10 mg of hydralazine.  Patient reports he was discharged from [facility] on 21 for CHF exacerbation and bilateral pleural effusion.  According to patient's chart he has had thoracentesis due to increased shortness of air due to pleural effusion secondary to CHF exacerbation.  Patient is very poor historian and compliant taking medication per cardiology's note.  Currently patient denies headache, dizziness, or visual disturbance.  Patient denies experiencing any chest pain or shortness of breath today or the past few days. Denies abdominal pain, nausea, vomiting, or diarrhea. Denies cough, fever, or lower extremity edema. Denies hematuria, dysuria, polyuria, polyphagia, or frequency/urgency.    Patient states that he has been out of his medication for the past 2 weeks. States he has switched to [facility] pharmacy and has not been able to get refill on his medication because the previous pharmacy indicated that he had 90 days refill back in December. Patient reports last time he had taken his Eliquis was 2 weeks ago. Patient is unable to provide us with a list of his home medication. I had reviewed his discharge summary and ordered the meds  that he was DC'd on. I have instructed nursing staff to call [facility] pharmacy in a.m. to get an updated list of his home medication.    While ambulating to the bathroom patient became lightheaded and was eased to the floor by the attending nurse.  Patient was assisted to the chair with the help of the staff and was found to be hypotensive at 98/57 with a MAP of 68.  Patient became diaphoretic and had an episode of emesis.  EKG, troponin, POC glucose, and 4 mg Zofran ordered.  EKG demonstrated normal sinus rhythm with no ischemic changes from previous EKG.  Troponin slightly elevated 0.108 and AST glucose 148.    Review of Systems   All systems were reviewed and negative except for: All systems are reviewed and negative except for the mentioned above in HPI    Personal History     Past Medical History:   Diagnosis Date   • Acquired hypothyroidism    • Acute congestive heart failure (HCC)    • Acute respiratory failure with hypoxia (HCC)    • Acute sinusitis    • SYLVAIN (acute kidney injury) (HCC)     on CKD   • Anemia    • Arthritis    • CAD (coronary artery disease)    • Cancer (HCC)     skin   • Chronic combined systolic and diastolic congestive heart failure (HCC)    • Chronic ischemic heart disease    • CKD (chronic kidney disease)    • COPD (chronic obstructive pulmonary disease) (HCC)    • Depression    • Diabetes mellitus type 2, uncontrolled, without complications    • Disease of thyroid gland    • Fatigue    • Frequent PVCs    • Heart attack (HCC)    • History of coronary artery disease     with remote history of bypass surgery in 2001   • History of transfusion    • Hyperglycemia    • Hyperlipidemia    • Hypertension    • Hypertensive encephalopathy    • Mental status change     Acute   • YAW on CPAP    • Pleural effusion    • Pneumonia    • RBBB (right bundle branch block)    • Screening for prostate cancer 2011   • Stroke (HCC)    • TIA (transient ischemic attack)    • Type 2 diabetes mellitus (HCC)    •  Urinary retention    • Vitamin D deficiency        Past Surgical History:   Procedure Laterality Date   • APPENDECTOMY N/A 02/14/2016    Dr. Alexey Dodson   • COLONOSCOPY      over 20 years ago.  no polyps    • COLONOSCOPY N/A 9/18/2018    Procedure: COLONOSCOPY;  Surgeon: Jose Enrique Louie MD;  Location: [facility] ENDOSCOPY;  Service: Gastroenterology   • COLONOSCOPY N/A 9/19/2018    IH, EH, polyps (TAs w/low grade dysplasia)   • COLONOSCOPY N/A 6/1/2020    Procedure: COLONOSCOPY;  Surgeon: Jose Enrique Louie MD;  Location: [facility] ENDOSCOPY;  Service: Gastroenterology;  Laterality: N/A;  Pre op-Anemia, Melena, History of Polyps  Post op-To Cecum/TI, Polyp, Poor Prep   • CORONARY ARTERY BYPASS GRAFT  11/2001   • ENDOSCOPY N/A 5/29/2020    Procedure: ESOPHAGOGASTRODUODENOSCOPY with biopsies;  Surgeon: Amanda Lovelace MD;  Location: [facility] ENDOSCOPY;  Service: Gastroenterology;  Laterality: N/A;  pre-anemia, dark stools  post-esophagitis, hiatal hernia   • ENDOSCOPY N/A 9/15/2020    Procedure: ESOPHAGOGASTRODUODENOSCOPY WITH BIOPSIES;  Surgeon: Jose Enrique Louie MD;  Location: [facility] ENDOSCOPY;  Service: Gastroenterology;  Laterality: N/A;  pre: history of melena and esophagitis  post: mild esophagitis and gastritis, small hiatal hernia   • HERNIA REPAIR Left 12/05/2019   • TONSILLECTOMY     • VASECTOMY         Family History: family history includes Alcohol abuse in his brother and father; Autism in his son; Cancer in his father; Cirrhosis in his brother; Diabetes in his mother and son; Heart disease in his father; Hypertension in his mother; Kidney failure in his son; Liver cancer (age of onset: 45) in his brother; Macular degeneration in his mother. Otherwise pertinent FHx was reviewed and not pertinent to current issue.    Social History:  reports that he has never smoked. He has never used smokeless tobacco. He reports that he does not drink alcohol and does not use drugs.    Home  Medications:  BASAGLAR KWIKPEN, apixaban, aspirin, buPROPion XL, carvedilol, ferrous sulfate, folic acid, insulin glulisine, levothyroxine, sodium zirconium cyclosilicate, tamsulosin, torsemide, vitamin B-12, and vitamin D3    Allergies:  Allergies   Allergen Reactions   • Prozac [Fluoxetine Hcl] Other (See Comments)     shaking       Objective   Objective     Vitals:   Temp:  [96 °F (35.6 °C)-97.7 °F (36.5 °C)] 97.7 °F (36.5 °C)  Heart Rate:  [48-65] 63  Resp:  [16-20] 20  BP: (126-208)/() 126/81  Physical Exam    Constitutional: Awake, alert   Eyes: PERRLA, sclerae anicteric, no conjunctival injection   HENT: NCAT, mucous membranes moist   Neck: Supple, no thyromegaly, no lymphadenopathy, trachea midline   Respiratory: Clear to auscultation bilaterally, nonlabored respirations    Cardiovascular: RRR, +murmurs, no rubs, or gallops, palpable pedal pulses bilaterally   Gastrointestinal: Positive bowel sounds, soft, nontender, nondistended   Musculoskeletal: No bilateral ankle edema, no clubbing or cyanosis to extremities   Psychiatric: Appropriate affect, cooperative   Neurologic: Oriented x 3, strength symmetric in all extremities, Cranial Nerves grossly intact to confrontation, speech clear   Skin: No rashes     Result Review    Result Review:  I have personally reviewed the results from the time of this admission to 2/1/2022 21:41 EST and agree with these findings:  []  Laboratory  []  Microbiology  []  Radiology  []  EKG/Telemetry   []  Cardiology/Vascular   []  Pathology  []  Old records  []  Other:  Most notable findings include:  EKG negative for any ischemic changes  Initial troponin was 0.106 and repeat at 1733 was 0.099 his baseline troponin is usually elevated due to CKD  BNP 3, 346, glucose 227, creatinine 3.43, GFR 18, TSH 1.28 WBC 6.28 and hemoglobin 12    Assessment/Plan   Assessment / Plan     Brief Patient Summary:  Carlito Mo is a 66 y.o. male who was seen and evaluated the ED for fatigue  underlies weakness. Patient has been admitted to observation for further evaluation and management of his hypertension medications.       Active Hospital Problems:  Active Hospital Problems    Diagnosis    • Hypertensive emergency      Plan:   Hypertensive emergency  -Patient was given 10 mg of labetalol and 10 mg of hydralazine at the ED which has improved his blood pressure to 126/81 and heart rate 63  -Continue carvedilol 25 mg twice daily  -Vital signs per nursing  -BP meds refill after getting updated list of his meds from [facility] pharmacy in a.m. and patient to follow-up with his PCP     CHF  BNP 3346 and we will continue torsemide 60 mg daily  Monitor intake and output    DM2  Chronic condition, continue home medication  Insulin sliding scale  Accu-Chek before meals and at bedtime    CAD and CVA  Continue Eliquis 2.5 twice daily    Hypothyroidism  TSH 1.280 and T4 1.39 and will continue home medication        DVT prophylaxis:  Medical DVT prophylaxis orders are present.    CODE STATUS:       Admission Status:  I believe this patient meets admission status.    Electronically signed by JI Ny, 02/01/22, 9:41 PM EST.

## 2022-02-02 NOTE — PLAN OF CARE
Goal Outcome Evaluation:              Outcome Summary: patient resting in room VSS, patient is being admitted r/t orthostatic BP, generalized weakness, unable to care for self, in room at this time with no complaints or questions at this time.

## 2022-02-02 NOTE — PROGRESS NOTES
[facility]     Progress Note    Name: Carlito Mo ADMIT: 2022   : 1955  PCP: Florencia Caballero MD    MRN: [id number] LOS: 0 days   AGE/SEX: 66 y.o. male  ROOM: [facility]/1     Subjective   Subjective     Subjective   Patient reports that he was here in December and given prescriptions at that time. He states that he ran out of medications and now needs these refilled. Patient reportedly had an episode of bilateral lower extremity weakness last night and states he was lowered to the ground while walking to the bathroom last night. He denies chest pain or shortness of breath, but reports he has been having lower extremity weakness for about a week. He was found to be hypertensive in ED last night and given several medications with improvement.    Review of Systems   Neurological: Positive for weakness.   All other systems reviewed and are negative.         Objective   Objective     Objective   Vital Signs  Temp:  [96 °F (35.6 °C)-97.7 °F (36.5 °C)] 97.5 °F (36.4 °C)  Heart Rate:  [48-74] 61  Resp:  [16-20] 17  BP: ()/() 128/69  SpO2:  [98 %-100 %] 99 %  on   ;   Device (Oxygen Therapy): room air  Body mass index is 24.41 kg/m².  Physical Exam  Vitals and nursing note reviewed.   Constitutional:       General: He is not in acute distress.     Appearance: Normal appearance. He is normal weight.   HENT:      Head: Normocephalic and atraumatic.      Nose: Nose normal.      Mouth/Throat:      Mouth: Mucous membranes are moist.   Eyes:      Extraocular Movements: Extraocular movements intact.   Cardiovascular:      Rate and Rhythm: Normal rate and regular rhythm.      Pulses: Normal pulses.      Heart sounds: Normal heart sounds.   Pulmonary:      Effort: Pulmonary effort is normal. No respiratory distress.      Breath sounds: Normal breath sounds.   Abdominal:      General: Abdomen is flat. Bowel sounds are normal.      Palpations: Abdomen is soft.   Musculoskeletal:         General: Normal range of  motion.      Cervical back: Normal range of motion and neck supple. No rigidity.   Skin:     General: Skin is warm and dry.   Neurological:      General: No focal deficit present.      Mental Status: He is alert and oriented to person, place, and time. Mental status is at baseline.   Psychiatric:         Mood and Affect: Mood normal.         Behavior: Behavior normal.         Thought Content: Thought content normal.         Judgment: Judgment normal.         Results Review     I reviewed the patient's new clinical results.  Results from last 7 days   Lab Units 02/01/22  1519   WBC 10*3/mm3 6.28   HEMOGLOBIN g/dL 12.0*   PLATELETS 10*3/mm3 204     Results from last 7 days   Lab Units 02/02/22  0348 02/01/22  1519   SODIUM mmol/L 139 140   POTASSIUM mmol/L 3.5 3.9   CHLORIDE mmol/L 103 102   CO2 mmol/L 24.3 26.0   BUN mg/dL 57* 62*   CREATININE mg/dL 3.10* 3.43*   GLUCOSE mg/dL 164* 227*   Estimated Creatinine Clearance: 27.1 mL/min (A) (by C-G formula based on SCr of 3.1 mg/dL (H)).  Results from last 7 days   Lab Units 02/01/22  1519   ALBUMIN g/dL 3.50   BILIRUBIN mg/dL 0.2   ALK PHOS U/L 90   AST (SGOT) U/L 15   ALT (SGPT) U/L 23     Results from last 7 days   Lab Units 02/02/22  0348 02/01/22  1519   CALCIUM mg/dL 9.1 9.1   ALBUMIN g/dL  --  3.50       COVID19   Date Value Ref Range Status   02/01/2022 Not Detected Not Detected - Ref. Range Final   12/13/2021 Not Detected Not Detected - Ref. Range Final     Glucose   Date/Time Value Ref Range Status   02/02/2022 0834 162 (H) 70 - 130 mg/dL Final     Comment:     Meter: [id number] : [id number] Chalino RAMIREZ   02/02/2022 0337 148 (H) 70 - 130 mg/dL Final     Comment:     Meter: [id number] : [id number] Hebert Mariano RN   02/01/2022 2239 153 (H) 70 - 130 mg/dL Final     Comment:     Meter: [id number] : [id number] Beata Vines RN       XR Chest 1 View  XR CHEST 1 VW-     Clinical: Fatigue     COMPARISON examination 12/15/2021     FINDINGS: Median  sternotomy wires and vascular markers in position.  Cardiac size upper limits of normal. No pleural effusion, vascular  congestion or acute airspace disease is demonstrated.     CONCLUSION: No active disease of the chest     This report was finalized on 2/1/2022 3:27 PM by Dr. Bryan Burnham M.D.       Scheduled Medications  apixaban, 2.5 mg, Oral, Q12H  aspirin, 81 mg, Oral, Daily  atorvastatin, 80 mg, Oral, Nightly  buPROPion XL, 150 mg, Oral, QAM  carvedilol, 12.5 mg, Oral, BID With Meals  ferrous sulfate, 325 mg, Oral, Daily With Breakfast & Dinner  folic acid, 1,000 mcg, Oral, Daily  insulin glargine, 4 Units, Subcutaneous, Nightly  insulin lispro, 0-9 Units, Subcutaneous, TID AC  insulin lispro, 2 Units, Subcutaneous, TID AC  levothyroxine, 75 mcg, Oral, Daily  sodium chloride, 500 mL, Intravenous, Once  sodium chloride, 10 mL, Intravenous, Q12H  tamsulosin, 0.4 mg, Oral, Nightly  torsemide, 60 mg, Oral, Daily    Infusions  sodium chloride, 75 mL/hr, Last Rate: 75 mL/hr (02/02/22 0344)    Diet  Diet Regular; Consistent Carbohydrate         Assessment/Plan   Assessment / Plan       Active Hospital Problems    Diagnosis  POA   • **Hypertensive urgency [I16.0]  Yes      Resolved Hospital Problems    Diagnosis Date Resolved POA   • Hypertensive emergency [I16.1] 02/02/2022 Yes       66 y.o. male admitted with hypertensive urgency to observation unit for further workup.     Hypertensive urgency  -ran out of his new medication prescriptions and states he ran out and since he hasn't seen his PCP, needs these refilled and can likely be discharged with another requested follow-up with his PCP which he reports he has scheduled  -Vital signs per nursing  -BP meds refill after getting updated list of his meds from [facility] in a.m. and patient to follow-up with his PCP     Elevated troponin  -likely related to his renal function which is at baseline     CHF  BNP 3346 and we will continue torsemide 60 mg  daily  Monitor intake and output     DM2  Chronic condition, continue home medication  Insulin sliding scale  Accu-Chek before meals and at bedtime     CAD and CVA  Continue Eliquis 2.5 twice daily     Hypothyroidism  TSH 1.280 and T4 1.39 and will continue home medication       · SCDs for DVT prophylaxis.  · Full code.  · Discussed with patient.  · Anticipate discharge today.    This progress note will additionally serve as discharge summary.     JI Torres  [facility]  02/02/22  09:52 EST

## 2022-02-02 NOTE — CONSULTS
"Diabetes Education  Assessment/Teaching    Patient Name:  Carlito Mo  YOB: 1955  MRN: [id number]  Admit Date:  2/1/2022      Assessment Date:  2/2/2022    Most Recent Value   General Information     Referral From: Other (comment)  [RN consult]   Height 182.9 cm (72\")   Height Method Stated   Weight 81.6 kg (180 lb)   Weight Method Stated   Pregnancy Assessment    Diabetes History    What type of diabetes do you have? Type 2   Current DM knowledge fair   Have you had diabetes education/teaching in the past? yes   When and where was your diabetes education? Pt seen during previous admissions with last consultation May 2021 when A1c 8.3%   Do you test your blood sugar at home? yes   Frequency of checks Every other day   Meter type Relion from [employer]   Who performs the test? self   Have You Felt Down, Depressed or Hopeless? yes  [Pt states he is depressed wtih multiple stressors.  Pt primarily voiced stressed dealing with his special needs son who now lives with pt.]   Education Preferences    Barriers to Learning other (comment)  [None noted]   Nutrition Information    Assessment Topics    Healthy Coping - Assessment Needs education   DM Goals    Taking Medication - Goal 0-7 days from discharge   Healthy Coping - Goal 0-30 days from discharge   Monitoring - Goal 0-7 days from discharge   Contact Plan Follow-up medical care            Most Recent Value   DM Education Needs    Meter Has own   Meter Type Other (comment)  [Relion]   Reducing Risks A1C testing  [No current A1c but A1c was 7.6% December 2021]   Healthy Coping Depressed   Discharge Plan Follow-up with endocrinolgoist   Motivation Engaged   Teaching Method Discussion,  Explanation   Patient Response Verbalized understanding        Other Comments:  Met with pt in room while pt was on speaker phone with spouse.  Pt admits to non-adherence with monitoring as well as insulin.  Pt states only monitors his glucose every other day and not taking " his insulin daily.  Discussed reasons for non-compliance to which pt states he is depressed and spouse states depression is an aspect.  Pt states he is not being treated for depression.  Discussed depression is a major barrier to proper diabetes self care and needed to be addressed.      Pt was being followed by [facility].  Pt states his provider no longer in that office.  Recommended spouse call office immediately and ask for new provider in that office for new appointment.  Spouse verbalize understanding.    Pt states has adequate supplies at home.      Above discussed with APRN.    Electronically signed by:  Sharif Neal RN, [facility]  02/02/22 13:16 EST

## 2022-02-03 ENCOUNTER — APPOINTMENT (OUTPATIENT)
Dept: CARDIOLOGY | Facility: HOSPITAL | Age: 67
End: 2022-02-03

## 2022-02-03 LAB
ALBUMIN SERPL-MCNC: 3.2 G/DL (ref 3.5–5.2)
ALBUMIN/GLOB SERPL: 1.4 G/DL
ALP SERPL-CCNC: 79 U/L (ref 39–117)
ALT SERPL W P-5'-P-CCNC: 17 U/L (ref 1–41)
ANION GAP SERPL CALCULATED.3IONS-SCNC: 12.1 MMOL/L (ref 5–15)
AST SERPL-CCNC: 14 U/L (ref 1–40)
BASOPHILS # BLD AUTO: 0.02 10*3/MM3 (ref 0–0.2)
BASOPHILS NFR BLD AUTO: 0.3 % (ref 0–1.5)
BH CV ECHO MEAS - ACS: 1.8 CM
BH CV ECHO MEAS - AO MAX PG: 6 MMHG
BH CV ECHO MEAS - AO MEAN PG (FULL): 1.6 MMHG
BH CV ECHO MEAS - AO MEAN PG: 3.1 MMHG
BH CV ECHO MEAS - AO ROOT AREA (BSA CORRECTED): 1.3
BH CV ECHO MEAS - AO ROOT AREA: 5.2 CM^2
BH CV ECHO MEAS - AO ROOT DIAM: 2.6 CM
BH CV ECHO MEAS - AO V2 MAX: 120 CM/SEC
BH CV ECHO MEAS - AO V2 MEAN: 82.2 CM/SEC
BH CV ECHO MEAS - AO V2 VTI: 23.5 CM
BH CV ECHO MEAS - ASC AORTA: 3 CM
BH CV ECHO MEAS - AVA(I,A): 2.2 CM^2
BH CV ECHO MEAS - AVA(I,D): 2.2 CM^2
BH CV ECHO MEAS - BSA(HAYCOCK): 2 M^2
BH CV ECHO MEAS - BSA: 2 M^2
BH CV ECHO MEAS - BZI_BMI: 24.4 KILOGRAMS/M^2
BH CV ECHO MEAS - BZI_METRIC_HEIGHT: 182.9 CM
BH CV ECHO MEAS - BZI_METRIC_WEIGHT: 81.6 KG
BH CV ECHO MEAS - EDV(CUBED): 85.1 ML
BH CV ECHO MEAS - EDV(MOD-SP2): 118 ML
BH CV ECHO MEAS - EDV(MOD-SP4): 129 ML
BH CV ECHO MEAS - EDV(TEICH): 87.6 ML
BH CV ECHO MEAS - EF(CUBED): 57.7 %
BH CV ECHO MEAS - EF(MOD-BP): 56 %
BH CV ECHO MEAS - EF(MOD-SP2): 55.1 %
BH CV ECHO MEAS - EF(MOD-SP4): 56.6 %
BH CV ECHO MEAS - EF(TEICH): 49.6 %
BH CV ECHO MEAS - ESV(CUBED): 36 ML
BH CV ECHO MEAS - ESV(MOD-SP2): 53 ML
BH CV ECHO MEAS - ESV(MOD-SP4): 56 ML
BH CV ECHO MEAS - ESV(TEICH): 44.2 ML
BH CV ECHO MEAS - FS: 24.9 %
BH CV ECHO MEAS - IVS/LVPW: 1
BH CV ECHO MEAS - IVSD: 1.2 CM
BH CV ECHO MEAS - LAT PEAK E' VEL: 5 CM/SEC
BH CV ECHO MEAS - LV DIASTOLIC VOL/BSA (35-75): 63.3 ML/M^2
BH CV ECHO MEAS - LV MASS(C)D: 197.2 GRAMS
BH CV ECHO MEAS - LV MASS(C)DI: 96.8 GRAMS/M^2
BH CV ECHO MEAS - LV MAX PG: 3 MMHG
BH CV ECHO MEAS - LV MEAN PG: 1.5 MMHG
BH CV ECHO MEAS - LV SYSTOLIC VOL/BSA (12-30): 27.5 ML/M^2
BH CV ECHO MEAS - LV V1 MAX: 87 CM/SEC
BH CV ECHO MEAS - LV V1 MEAN: 56.5 CM/SEC
BH CV ECHO MEAS - LV V1 VTI: 17.3 CM
BH CV ECHO MEAS - LVIDD: 4.4 CM
BH CV ECHO MEAS - LVIDS: 3.3 CM
BH CV ECHO MEAS - LVLD AP2: 9.4 CM
BH CV ECHO MEAS - LVLD AP4: 8.8 CM
BH CV ECHO MEAS - LVLS AP2: 8.9 CM
BH CV ECHO MEAS - LVLS AP4: 8.1 CM
BH CV ECHO MEAS - LVOT AREA (M): 3.1 CM^2
BH CV ECHO MEAS - LVOT AREA: 3 CM^2
BH CV ECHO MEAS - LVOT DIAM: 2 CM
BH CV ECHO MEAS - LVPWD: 1.2 CM
BH CV ECHO MEAS - MED PEAK E' VEL: 5 CM/SEC
BH CV ECHO MEAS - MV A DUR: 0.12 SEC
BH CV ECHO MEAS - MV A MAX VEL: 84.4 CM/SEC
BH CV ECHO MEAS - MV DEC SLOPE: 225.1 CM/SEC^2
BH CV ECHO MEAS - MV DEC TIME: 246 SEC
BH CV ECHO MEAS - MV E MAX VEL: 64.7 CM/SEC
BH CV ECHO MEAS - MV E/A: 0.77
BH CV ECHO MEAS - MV MEAN PG: 1.2 MMHG
BH CV ECHO MEAS - MV P1/2T MAX VEL: 73.6 CM/SEC
BH CV ECHO MEAS - MV P1/2T: 95.8 MSEC
BH CV ECHO MEAS - MV V2 MEAN: 51.5 CM/SEC
BH CV ECHO MEAS - MV V2 VTI: 32.3 CM
BH CV ECHO MEAS - MVA P1/2T LCG: 3 CM^2
BH CV ECHO MEAS - MVA(P1/2T): 2.3 CM^2
BH CV ECHO MEAS - MVA(VTI): 1.6 CM^2
BH CV ECHO MEAS - PA ACC SLOPE: 14.3 CM/SEC^2
BH CV ECHO MEAS - PA ACC TIME: 0.13 SEC
BH CV ECHO MEAS - PA MAX PG (FULL): 1 MMHG
BH CV ECHO MEAS - PA MAX PG: 2.5 MMHG
BH CV ECHO MEAS - PA PR(ACCEL): 22 MMHG
BH CV ECHO MEAS - PA V2 MAX: 79 CM/SEC
BH CV ECHO MEAS - PVA(V,A): 3 CM^2
BH CV ECHO MEAS - PVA(V,D): 3 CM^2
BH CV ECHO MEAS - QP/QS: 1
BH CV ECHO MEAS - RAP SYSTOLE: 3 MMHG
BH CV ECHO MEAS - RV MAX PG: 1.5 MMHG
BH CV ECHO MEAS - RV MEAN PG: 0.62 MMHG
BH CV ECHO MEAS - RV V1 MAX: 60.2 CM/SEC
BH CV ECHO MEAS - RV V1 MEAN: 35.8 CM/SEC
BH CV ECHO MEAS - RV V1 VTI: 13.4 CM
BH CV ECHO MEAS - RVOT AREA: 4 CM^2
BH CV ECHO MEAS - RVOT DIAM: 2.3 CM
BH CV ECHO MEAS - SI(AO): 60 ML/M^2
BH CV ECHO MEAS - SI(CUBED): 24.1 ML/M^2
BH CV ECHO MEAS - SI(LVOT): 25.7 ML/M^2
BH CV ECHO MEAS - SI(MOD-SP2): 31.9 ML/M^2
BH CV ECHO MEAS - SI(MOD-SP4): 35.8 ML/M^2
BH CV ECHO MEAS - SI(TEICH): 21.3 ML/M^2
BH CV ECHO MEAS - SV(AO): 122.3 ML
BH CV ECHO MEAS - SV(CUBED): 49.1 ML
BH CV ECHO MEAS - SV(LVOT): 52.4 ML
BH CV ECHO MEAS - SV(MOD-SP2): 65 ML
BH CV ECHO MEAS - SV(MOD-SP4): 73 ML
BH CV ECHO MEAS - SV(RVOT): 53.4 ML
BH CV ECHO MEAS - SV(TEICH): 43.4 ML
BH CV ECHO MEAS - TAPSE (>1.6): 2.1 CM
BH CV ECHO MEASUREMENTS AVERAGE E/E' RATIO: 12.94
BH CV XLRA - RV BASE: 3.1 CM
BH CV XLRA - RV LENGTH: 7.9 CM
BH CV XLRA - RV MID: 3.1 CM
BILIRUB SERPL-MCNC: 0.2 MG/DL (ref 0–1.2)
BUN SERPL-MCNC: 62 MG/DL (ref 8–23)
BUN/CREAT SERPL: 20.7 (ref 7–25)
CALCIUM SPEC-SCNC: 8.7 MG/DL (ref 8.6–10.5)
CHLORIDE SERPL-SCNC: 102 MMOL/L (ref 98–107)
CHOLEST SERPL-MCNC: 256 MG/DL (ref 0–200)
CO2 SERPL-SCNC: 24.9 MMOL/L (ref 22–29)
CREAT SERPL-MCNC: 3 MG/DL (ref 0.76–1.27)
DEPRECATED RDW RBC AUTO: 44.2 FL (ref 37–54)
EOSINOPHIL # BLD AUTO: 0.26 10*3/MM3 (ref 0–0.4)
EOSINOPHIL NFR BLD AUTO: 4.4 % (ref 0.3–6.2)
ERYTHROCYTE [DISTWIDTH] IN BLOOD BY AUTOMATED COUNT: 14.2 % (ref 12.3–15.4)
GFR SERPL CREATININE-BSD FRML MDRD: 21 ML/MIN/1.73
GLOBULIN UR ELPH-MCNC: 2.3 GM/DL
GLUCOSE BLDC GLUCOMTR-MCNC: 115 MG/DL (ref 70–130)
GLUCOSE BLDC GLUCOMTR-MCNC: 135 MG/DL (ref 70–130)
GLUCOSE BLDC GLUCOMTR-MCNC: 213 MG/DL (ref 70–130)
GLUCOSE BLDC GLUCOMTR-MCNC: 257 MG/DL (ref 70–130)
GLUCOSE SERPL-MCNC: 115 MG/DL (ref 65–99)
HBA1C MFR BLD: 9.38 % (ref 4.8–5.6)
HCT VFR BLD AUTO: 37 % (ref 37.5–51)
HDLC SERPL-MCNC: 48 MG/DL (ref 40–60)
HGB BLD-MCNC: 11.5 G/DL (ref 13–17.7)
IMM GRANULOCYTES # BLD AUTO: 0.02 10*3/MM3 (ref 0–0.05)
IMM GRANULOCYTES NFR BLD AUTO: 0.3 % (ref 0–0.5)
LDLC SERPL CALC-MCNC: 193 MG/DL (ref 0–100)
LDLC/HDLC SERPL: 3.97 {RATIO}
LEFT ATRIUM VOLUME INDEX: 28 ML/M2
LYMPHOCYTES # BLD AUTO: 1.29 10*3/MM3 (ref 0.7–3.1)
LYMPHOCYTES NFR BLD AUTO: 21.9 % (ref 19.6–45.3)
MAXIMAL PREDICTED HEART RATE: 154 BPM
MCH RBC QN AUTO: 26.3 PG (ref 26.6–33)
MCHC RBC AUTO-ENTMCNC: 31.1 G/DL (ref 31.5–35.7)
MCV RBC AUTO: 84.5 FL (ref 79–97)
MONOCYTES # BLD AUTO: 0.59 10*3/MM3 (ref 0.1–0.9)
MONOCYTES NFR BLD AUTO: 10 % (ref 5–12)
NEUTROPHILS NFR BLD AUTO: 3.71 10*3/MM3 (ref 1.7–7)
NEUTROPHILS NFR BLD AUTO: 63.1 % (ref 42.7–76)
NRBC BLD AUTO-RTO: 0 /100 WBC (ref 0–0.2)
PLATELET # BLD AUTO: 198 10*3/MM3 (ref 140–450)
PMV BLD AUTO: 10.7 FL (ref 6–12)
POTASSIUM SERPL-SCNC: 3.7 MMOL/L (ref 3.5–5.2)
PROT SERPL-MCNC: 5.5 G/DL (ref 6–8.5)
QT INTERVAL: 488 MS
RBC # BLD AUTO: 4.38 10*6/MM3 (ref 4.14–5.8)
SODIUM SERPL-SCNC: 139 MMOL/L (ref 136–145)
STRESS TARGET HR: 131 BPM
TRIGL SERPL-MCNC: 87 MG/DL (ref 0–150)
TROPONIN T SERPL-MCNC: 0.11 NG/ML (ref 0–0.03)
TSH SERPL DL<=0.05 MIU/L-ACNC: 1.45 UIU/ML (ref 0.27–4.2)
VLDLC SERPL-MCNC: 15 MG/DL (ref 5–40)
WBC NRBC COR # BLD: 5.89 10*3/MM3 (ref 3.4–10.8)

## 2022-02-03 PROCEDURE — 93306 TTE W/DOPPLER COMPLETE: CPT | Performed by: INTERNAL MEDICINE

## 2022-02-03 PROCEDURE — 99222 1ST HOSP IP/OBS MODERATE 55: CPT | Performed by: INTERNAL MEDICINE

## 2022-02-03 PROCEDURE — 97530 THERAPEUTIC ACTIVITIES: CPT

## 2022-02-03 PROCEDURE — 80061 LIPID PANEL: CPT | Performed by: HOSPITALIST

## 2022-02-03 PROCEDURE — 25010000002 PERFLUTREN (DEFINITY) 8.476 MG IN SODIUM CHLORIDE (PF) 0.9 % 10 ML INJECTION: Performed by: HOSPITALIST

## 2022-02-03 PROCEDURE — 93005 ELECTROCARDIOGRAM TRACING: CPT | Performed by: HOSPITALIST

## 2022-02-03 PROCEDURE — 84484 ASSAY OF TROPONIN QUANT: CPT | Performed by: HOSPITALIST

## 2022-02-03 PROCEDURE — 82962 GLUCOSE BLOOD TEST: CPT

## 2022-02-03 PROCEDURE — 63710000001 INSULIN LISPRO (HUMAN) PER 5 UNITS: Performed by: HOSPITALIST

## 2022-02-03 PROCEDURE — 85025 COMPLETE CBC W/AUTO DIFF WBC: CPT | Performed by: HOSPITALIST

## 2022-02-03 PROCEDURE — 93306 TTE W/DOPPLER COMPLETE: CPT

## 2022-02-03 PROCEDURE — 80053 COMPREHEN METABOLIC PANEL: CPT | Performed by: HOSPITALIST

## 2022-02-03 PROCEDURE — 93010 ELECTROCARDIOGRAM REPORT: CPT | Performed by: INTERNAL MEDICINE

## 2022-02-03 PROCEDURE — 83036 HEMOGLOBIN GLYCOSYLATED A1C: CPT | Performed by: HOSPITALIST

## 2022-02-03 PROCEDURE — 97165 OT EVAL LOW COMPLEX 30 MIN: CPT

## 2022-02-03 PROCEDURE — 84443 ASSAY THYROID STIM HORMONE: CPT | Performed by: HOSPITALIST

## 2022-02-03 RX ORDER — CARVEDILOL 3.12 MG/1
3.12 TABLET ORAL 2 TIMES DAILY WITH MEALS
Status: DISCONTINUED | OUTPATIENT
Start: 2022-02-03 | End: 2022-02-04 | Stop reason: HOSPADM

## 2022-02-03 RX ADMIN — APIXABAN 2.5 MG: 2.5 TABLET, FILM COATED ORAL at 20:13

## 2022-02-03 RX ADMIN — INSULIN LISPRO 6 UNITS: 100 INJECTION, SOLUTION INTRAVENOUS; SUBCUTANEOUS at 20:42

## 2022-02-03 RX ADMIN — LEVOTHYROXINE SODIUM 75 MCG: 0.07 TABLET ORAL at 06:08

## 2022-02-03 RX ADMIN — TAMSULOSIN HYDROCHLORIDE 0.4 MG: 0.4 CAPSULE ORAL at 20:13

## 2022-02-03 RX ADMIN — FAMOTIDINE 20 MG: 20 TABLET, FILM COATED ORAL at 20:14

## 2022-02-03 RX ADMIN — PERFLUTREN 3 ML: 6.52 INJECTION, SUSPENSION INTRAVENOUS at 16:02

## 2022-02-03 RX ADMIN — TORSEMIDE 20 MG: 20 TABLET ORAL at 18:41

## 2022-02-03 RX ADMIN — ATORVASTATIN CALCIUM 10 MG: 20 TABLET, FILM COATED ORAL at 20:13

## 2022-02-03 RX ADMIN — TORSEMIDE 20 MG: 20 TABLET ORAL at 08:34

## 2022-02-03 RX ADMIN — INSULIN GLARGINE-YFGN 10 UNITS: 100 INJECTION, SOLUTION SUBCUTANEOUS at 20:42

## 2022-02-03 RX ADMIN — FAMOTIDINE 20 MG: 20 TABLET, FILM COATED ORAL at 08:34

## 2022-02-03 RX ADMIN — BUPROPION HYDROCHLORIDE 150 MG: 150 TABLET, EXTENDED RELEASE ORAL at 06:08

## 2022-02-03 RX ADMIN — CARVEDILOL 6.25 MG: 6.25 TABLET, FILM COATED ORAL at 08:34

## 2022-02-03 RX ADMIN — INSULIN LISPRO 4 UNITS: 100 INJECTION, SOLUTION INTRAVENOUS; SUBCUTANEOUS at 11:57

## 2022-02-03 RX ADMIN — ASPIRIN 81 MG: 81 TABLET, COATED ORAL at 08:34

## 2022-02-03 RX ADMIN — CARVEDILOL 3.12 MG: 6.25 TABLET, FILM COATED ORAL at 18:11

## 2022-02-03 RX ADMIN — APIXABAN 2.5 MG: 2.5 TABLET, FILM COATED ORAL at 08:34

## 2022-02-03 NOTE — PROGRESS NOTES
Brief cardiology progress note:  Stop by to try and see and examine the patient but he is on the phone with [place] try to get his phone working.  He like to continue to work get his phone working I will try and stop by later if possible.  From chart review looks like he became orthostatic after aggressive treatment of his hypertension upon presentation due to being out of medications.  Would recommend just restarting back on his home regimen as his blood pressure and heart rate to be doing better today.  Will follow up with full exam if patient still here but from my standpoint does not need to stay in the hospital.  Would hold off on any further cardiac testing at this point.

## 2022-02-03 NOTE — CASE MANAGEMENT/SOCIAL WORK
Discharge Planning Assessment  [facility]     Patient Name: Carlito Mo  MRN: [id number]  Today's Date: 2/3/2022    Admit Date: 2/1/2022     Discharge Needs Assessment     Row Name 02/03/22 0807       Living Environment    Lives With child(carlos), adult; spouse    Name(s) of Who Lives With Patient Spouse  ( Lissette Mo [phone]) and son  ( Tim Mo)    Current Living Arrangements home/apartment/condo    Primary Care Provided by self    Provides Primary Care For no one    Family Caregiver if Needed child(carlos), adult; spouse    Family Caregiver Names Spouse  ( Lissette Mo [phone]) and son  ( Tim Mo)    Quality of Family Relationships helpful; involved; supportive    Able to Return to Prior Arrangements yes    Living Arrangement Comments Pt lives in a tri level house with his spouse  ( Lissette Mo [phone]) and son  ( Tim Mo).       Resource/Environmental Concerns    Resource/Environmental Concerns none    Transportation Concerns car, none       Transition Planning    Patient/Family Anticipates Transition to home with family    Patient/Family Anticipated Services at Transition none    Transportation Anticipated family or friend will provide       Discharge Needs Assessment    Readmission Within the Last 30 Days no previous admission in last 30 days    Equipment Currently Used at Home cane, straight; glucometer; grab bar; oxygen; shower chair    Concerns to be Addressed no discharge needs identified; denies needs/concerns at this time    Anticipated Changes Related to Illness none    Equipment Needed After Discharge cane, straight; grab bar, tub/shower; glucometer; oxygen; shower chair               Discharge Plan     Row Name 02/03/22 0811       Plan    Plan Plan home with family.   PATEL Cody RN    Patient/Family in Agreement with Plan yes    Plan Comments FACE SHEET VERIFIED/ IM LETTER SIGNED.  Spoke with pt at bedside.  Pt's PCP is Dr. Florencia Caballero. Pt lives in a tri level house with  his spouse  ( Lissette Mo [phone]) and son  ( Tim Mo).  Pt is independent with ADLs.  Pt has home O2 provided by [employer].  Pt has a straight cane, glucometer, grab bar, and shower chair for home use if needed.   Pt gets his prescriptions at [facility] on [address].  Pt states he has issues affording medications and making the time to pick them up.   Pt enrolled in Meds to Bed.  Pt was instructed on the importance of picking up his medications and taking them. Pt is not current with HH.  Pt has not been in SNF.  Pt denies any discharge needs.  Plan home with family.   PATEL Cody RN              Continued Care and Services - Admitted Since 2/1/2022    Coordination has not been started for this encounter.          Demographic Summary     Row Name 02/03/22 0806       General Information    Admission Type inpatient    Arrived From emergency department    Required Notices Provided Important Message from Medicare    Referral Source admission list    Reason for Consult discharge planning    Preferred Language English     Used During This Interaction no               Functional Status     Row Name 02/03/22 0806       Functional Status    Usual Activity Tolerance good    Current Activity Tolerance moderate       Functional Status, IADL    Medications independent    Meal Preparation assistive person    Housekeeping assistive person    Laundry assistive person    Shopping assistive person       Mental Status    General Appearance WDL WDL               Psychosocial    No documentation.                Abuse/Neglect    No documentation.                Legal    No documentation.                Substance Abuse    No documentation.                Patient Forms    No documentation.                   Lou Cody, RN

## 2022-02-03 NOTE — CASE MANAGEMENT/SOCIAL WORK
Continued Stay Note  [facility]     Patient Name: Carlito Mo  MRN: [id number]  Today's Date: 2/3/2022    Admit Date: 2/1/2022     Discharge Plan     Row Name 02/03/22 0811       Plan    Plan Plan home with family.   PATEL Cody RN    Patient/Family in Agreement with Plan yes    Plan Comments FACE SHEET VERIFIED/ IM LETTER SIGNED.  Spoke with pt at bedside.  Pt's PCP is Dr. Florencia Caballero. Pt lives in a tri level house with his spouse  ( Lissette Mo [phone]) and son  ( Tim Mo).  Pt is independent with ADLs.  Pt has home O2 provided by [place].  Pt has a straight cane, glucometer, grab bar, and shower chair for home use if needed.   Pt gets his prescriptions at [facility].  Pt states he has issues affording medications and making the time to pick them up.   Pt enrolled in Meds to Bed.  Pt was instructed on the importance of picking up his medications and taking them. Pt is not current with .  Pt has not been in SNF.  Pt denies any discharge needs.  Plan home with family.   PATEL Cody RN               Discharge Codes    No documentation.                     Lou Cody RN

## 2022-02-03 NOTE — PLAN OF CARE
Goal Outcome Evaluation:            Pt. Arrived to this unit from ER at  1850, alert, oriented x4, independent to perform with ADLs, ambulatory, no skin issues noted, skin intact, no voiced c/o pain, v/s stable, 02 sats 99% on room air, no short of air noted,no s/s acute distress noted

## 2022-02-03 NOTE — CONSULTS
[facility] Consult Note       Encounter Date:22  Patient:Carlito Mo  :1955  MRN:[id number]    Date of Admission: 2022  Date of Encounter Visit: 22  Encounter Provider: Grupo Hurst MD  Referring Provider: Damian Cook MD  Place of Service: [facility]  Patient Care Team:  Florencia Caballero MD as PCP - General (Internal Medicine)  Amber Murguia MD as Consulting Physician (Cardiology)  Sera La BRYAN as Pharmacist  OSeth Conte MD as Surgeon (General Surgery)  Kim Hoyos MD PhD as Consulting Physician (Hematology and Oncology)  eJrome Diallo MD as Referring Physician (Family Medicine)  Jose Enrique Louie MD as Consulting Physician (Gastroenterology)  Nima Huddleston MD as Consulting Physician (Nephrology)      Consulted for: elevated troponin     Chief Complaint: fatigue, weakness, hypertensive emergency      History of Presenting Illness:      Mr. Mo is a 66 y.o. gentleman who follows with Dr. Murguia with past medical history notable for hypertension, mixed upper lipidemia, diabetes, chronic kidney disease, history of prior CVA on anticoagulation, coronary artery disease with prior CABG, and chronic systolic congestive heart failure ejection fraction of 31 to 35% range.  He presented to the emergency room 2 days ago after running out of home medications with hypertensive urgency noted to have mildly elevated troponins as well as to have a elevated creatinine.  His home medications was restarted but also as needed blood pressure medications were added resulting in some hypotension yesterday and orthostasis.  Some of his medications were adjusted however asked to see him given his orthostatic episode yesterday preventing him from being discharged from the observation unit.    He was most recently seen by our group in the hospital for an acute exacerbation of his congestive heart failure and diuresed at that  time.    Review of Systems:  Review of Systems   Constitutional: Positive for malaise/fatigue.   HENT: Negative.    Eyes: Negative.    Cardiovascular: Positive for dyspnea on exertion.   Respiratory: Positive for shortness of breath.    Endocrine: Negative.    Hematologic/Lymphatic: Negative.    Skin: Negative.    Musculoskeletal: Negative.    Gastrointestinal: Negative.    Genitourinary: Negative.    Neurological: Positive for dizziness.   Psychiatric/Behavioral: Negative.    Allergic/Immunologic: Negative.        Medications:    Current Facility-Administered Medications:   •  apixaban (ELIQUIS) tablet 2.5 mg, 2.5 mg, Oral, Q12H, Qadah, Abdalhakim, APRN, 2.5 mg at 02/03/22 0834  •  aspirin EC tablet 81 mg, 81 mg, Oral, Daily, Qadah, Lorenaalhakim, APRN, 81 mg at 02/03/22 0834  •  atorvastatin (LIPITOR) tablet 10 mg, 10 mg, Oral, Nightly, Josue Pickens MD, 10 mg at 02/02/22 2106  •  buPROPion XL (WELLBUTRIN XL) 24 hr tablet 150 mg, 150 mg, Oral, QAM, Qadah, Abdalhakim, APRN, 150 mg at 02/03/22 0608  •  carvedilol (COREG) tablet 6.25 mg, 6.25 mg, Oral, BID With Meals, Josue Pickens MD, 6.25 mg at 02/03/22 0834  •  famotidine (PEPCID) tablet 20 mg, 20 mg, Oral, BID, Josue Pickens MD, 20 mg at 02/03/22 0834  •  insulin glargine (LANTUS, SEMGLEE) injection 10 Units, 10 Units, Subcutaneous, Nightly, Josue Pickens MD, 10 Units at 02/02/22 2108  •  insulin lispro (ADMELOG) injection 0-9 Units, 0-9 Units, Subcutaneous, 4x Daily With Meals & Nightly, Josue Pickens MD, 4 Units at 02/02/22 2106  •  levothyroxine (SYNTHROID, LEVOTHROID) tablet 75 mcg, 75 mcg, Oral, Daily, Qadaaston, Lorenaalhasaleemm, APRN, 75 mcg at 02/03/22 0608  •  ondansetron (ZOFRAN) injection 4 mg, 4 mg, Intravenous, Q6H PRN, Qadah, Abdalprimitivokim, APRN  •  sodium chloride 0.9 % flush 10 mL, 10 mL, Intravenous, PRN, Qadah, Abdalhakim, APRN  •  tamsulosin (FLOMAX) 24 hr capsule 0.4 mg, 0.4 mg, Oral, Nightly, Uma, Juan, APRN, 0.4 mg at 02/02/22 2106  •  torsemide  (DEMADEX) tablet 20 mg, 20 mg, Oral, BID, Josue Pickens MD, 20 mg at 02/03/22 0834    Allergies   Allergen Reactions   • Prozac [Fluoxetine Hcl] Other (See Comments)     shaking       Past Medical History:   Diagnosis Date   • Acquired hypothyroidism    • Acute congestive heart failure (HCC)    • Acute respiratory failure with hypoxia (HCC)    • Acute sinusitis    • SYLVAIN (acute kidney injury) (HCC)     on CKD   • Anemia    • Arthritis    • CAD (coronary artery disease)    • Cancer (HCC)     skin   • Chronic combined systolic and diastolic congestive heart failure (HCC)    • Chronic ischemic heart disease    • CKD (chronic kidney disease)    • COPD (chronic obstructive pulmonary disease) (HCC)    • Depression    • Diabetes mellitus type 2, uncontrolled, without complications    • Disease of thyroid gland    • Fatigue    • Frequent PVCs    • Heart attack (HCC)    • History of coronary artery disease     with remote history of bypass surgery in 2001   • History of transfusion    • Hyperglycemia    • Hyperlipidemia    • Hypertension    • Hypertensive encephalopathy    • Mental status change     Acute   • YAW on CPAP    • Pleural effusion    • Pneumonia    • RBBB (right bundle branch block)    • Screening for prostate cancer 2011   • Stroke (HCC)    • TIA (transient ischemic attack)    • Type 2 diabetes mellitus (HCC)    • Urinary retention    • Vitamin D deficiency        Past Surgical History:   Procedure Laterality Date   • APPENDECTOMY N/A 02/14/2016    Dr. Alexey Dodson   • COLONOSCOPY      over 20 years ago.  no polyps    • COLONOSCOPY N/A 9/18/2018    Procedure: COLONOSCOPY;  Surgeon: Jose Enrique Louie MD;  Location: [facility] ENDOSCOPY;  Service: Gastroenterology   • COLONOSCOPY N/A 9/19/2018    IH, EH, polyps (TAs w/low grade dysplasia)   • COLONOSCOPY N/A 6/1/2020    Procedure: COLONOSCOPY;  Surgeon: Jose Enrique Louie MD;  Location: [facility] ENDOSCOPY;  Service: Gastroenterology;  Laterality: N/A;  Pre  op-Anemia, Melena, History of Polyps  Post op-To Cecum/TI, Polyp, Poor Prep   • CORONARY ARTERY BYPASS GRAFT  2001   • ENDOSCOPY N/A 2020    Procedure: ESOPHAGOGASTRODUODENOSCOPY with biopsies;  Surgeon: Amanda Lovelace MD;  Location: [facility] ENDOSCOPY;  Service: Gastroenterology;  Laterality: N/A;  pre-anemia, dark stools  post-esophagitis, hiatal hernia   • ENDOSCOPY N/A 9/15/2020    Procedure: ESOPHAGOGASTRODUODENOSCOPY WITH BIOPSIES;  Surgeon: Jose Enrique Louie MD;  Location: [facility] ENDOSCOPY;  Service: Gastroenterology;  Laterality: N/A;  pre: history of melena and esophagitis  post: mild esophagitis and gastritis, small hiatal hernia   • HERNIA REPAIR Left 2019   • TONSILLECTOMY     • VASECTOMY         Social History     Socioeconomic History   • Marital status:      Spouse name: Lissette   • Number of children: 3   • Years of education: college   Tobacco Use   • Smoking status: Never Smoker   • Smokeless tobacco: Never Used   • Tobacco comment: daily caffeine   Vaping Use   • Vaping Use: Never used   Substance and Sexual Activity   • Alcohol use: No   • Drug use: No   • Sexual activity: Defer       Family History   Problem Relation Age of Onset   • Diabetes Mother    • Hypertension Mother    • Macular degeneration Mother    • Alcohol abuse Father    • Cancer Father         lung,  age 65   • Heart disease Father    • Alcohol abuse Brother    • Cirrhosis Brother          age 50   • Liver cancer Brother 45   • Diabetes Son    • Kidney failure Son    • Autism Son        The following portions of the patient's history were reviewed and updated as appropriate: allergies, current medications, past family history, past medical history, past social history, past surgical history and problem list.         Objective:      Temp:  [97.2 °F (36.2 °C)-98.3 °F (36.8 °C)] 98.3 °F (36.8 °C)  Heart Rate:  [55-77] 62  Resp:  [18] 18  BP: ()/(55-83) 119/67     Intake/Output Summary (Last  24 hours) at 2/3/2022 0856  Last data filed at 2/3/2022 0556  Gross per 24 hour   Intake 120 ml   Output 1100 ml   Net -980 ml     Body mass index is 24.41 kg/m².      02/01/22 1917 02/02/22 1949   Weight: 81.6 kg (180 lb) 81.6 kg (180 lb)           Physical Exam:  Constitutional: Well appearing, well developed, no acute distress   HENT: Oropharynx clear and membrane moist  Eyes: Normal conjunctiva, no sclera icterus.  Neck: Supple, no carotid bruit bilaterally.  Cardiovascular: Regular rate and rhythm, No Murmur, No bilateral lower extremity edema.  Pulmonary: Normal respiratory effort, normal lung sounds, no wheezing.  Abdominal: Soft, nontender, no hepatosplenomegaly, liver is non-pulsatile.  Neurological: Alert and orient x 3.   Skin: Warm, dry, no ecchymosis, no rash.  Psych: Appropriate mood and affect. Normal judgment and insight.         Lab Review:   Results from last 7 days   Lab Units 02/03/22  0507 02/02/22  0348 02/01/22  1519   SODIUM mmol/L 139 139 140   POTASSIUM mmol/L 3.7 3.5 3.9   CHLORIDE mmol/L 102 103 102   CO2 mmol/L 24.9 24.3 26.0   BUN mg/dL 62* 57* 62*   CREATININE mg/dL 3.00* 3.10* 3.43*   GLUCOSE mg/dL 115* 164* 227*   CALCIUM mg/dL 8.7 9.1 9.1   AST (SGOT) U/L 14  --  15   ALT (SGPT) U/L 17  --  23     Results from last 7 days   Lab Units 02/03/22  0507 02/02/22  0642 02/02/22  0348 02/01/22  1733 02/01/22  1519   TROPONIN T ng/mL 0.111* 0.098* 0.108* 0.099* 0.106*     Results from last 7 days   Lab Units 02/03/22  0507 02/01/22  1519   WBC 10*3/mm3 5.89 6.28   HEMOGLOBIN g/dL 11.5* 12.0*   HEMATOCRIT % 37.0* 37.2*   PLATELETS 10*3/mm3 198 204             Results from last 7 days   Lab Units 02/03/22  0507   CHOLESTEROL mg/dL 256*   TRIGLYCERIDES mg/dL 87   HDL CHOL mg/dL 48     The ASCVD Risk score (Klever HODGSON Jr., et al., 2013) failed to calculate for the following reasons:    The patient has a prior MI or stroke diagnosis     Results from last 7 days   Lab Units 02/01/22  1519   PROBNP  pg/mL 3,346.0*     Results from last 7 days   Lab Units 02/03/22  0507 02/01/22  1519   TSH uIU/mL 1.450 1.280     Previous Cardiac Testing:        EKG 2/3/22    EKG 2/2/22    Admit EKG 2/1/22    Previous EKG 12/15/21      I reviewed his EKG above which demonstrates sinus rhythm with bundle branch block and left anterior fascicular block however no acute ischemic changes.      Echocardiogram 5/21/21 with images reviewed by myself:  · There is moderate to severe global hypokinesis  · Left ventricular ejection fraction appears to be 31 - 35%.  · Left ventricular diastolic function is consistent with (grade III w/high LAP) fixed restrictive pattern  · The right ventricular cavity is mildly dilated. Moderately reduced right ventricular systolic function noted.  · The left atrial cavity is moderately dilated.  · Mild mitral valve regurgitation is present  · There is a small (<1cm) pericardial effusion.  · There is a large left pleural effusion.    Stress Test 4/24/19:  · Myocardial perfusion imaging indicates a medium-sized infarct located in the apex/periapical areas with no significant ischemia noted.  · Left ventricular ejection fraction is mildly reduced (Calculated EF = 44%).       Assessment:           Hypertensive urgency    Hypertension not at goal         Plan:       Mr. Mo is a 66 y.o. gentleman who follows with Dr. Murguia with past medical history notable for hypertension, mixed hyperlipidemia, diabetes, chronic kidney disease, history of prior CVA on anticoagulation, coronary artery disease with prior CABG, and chronic systolic congestive heart failure ejection fraction of 31 to 35% range.  He presented to the emergency room 2 days ago after running out of home medications with hypertensive urgency and it looks like he may been a little overcorrected when restarting home medications as well as adding on as needed medications including labetalol.  Seem to be doing better now.  Blood pressure is better  controlled.  Would continue with his current medical regimen and diuretics being adjusted by nephrology.  No further cardiac work-up needed at this point and will follow along peripherally.      Chronic systolic congestive heart failure:  · Continue carvedilol  · No ARB due to chronic kidney disease  · Continue current diuretic management as directed by nephrology    Hypertensive crisis:  · Related to not having medications  · Was a little overcorrected but now doing better would continue current medical regimen  · Rest the importance of medical compliance    Coronary artery disease without angina:  · Highly elevated troponins likely represent poor clearance from chronic renal insufficiency and hypertensive crisis rather than any acute coronary syndrome  · No further ischemic work-up needed at this time  · Follow-up as scheduled as outpatient with primary cardiologist        Thank you for allowing me to participate in the care of Carlito JANINE Mo. Feel free to contact me directly with any further questions or concerns.    Grupo Hurst MD  [facility]  02/03/22  08:56 EST

## 2022-02-03 NOTE — THERAPY EVALUATION
Patient Name: Carlito Mo  : 1955    MRN: [id number]                              Today's Date: 2/3/2022       Admit Date: 2022    Visit Dx:     ICD-10-CM ICD-9-CM   1. Hypertension not at goal  I10 401.9   2. Other fatigue  R53.83 780.79   3. Noncompliance with medications  Z91.14 V15.81     Patient Active Problem List   Diagnosis   • Major depressive disorder with single episode, in partial remission (HCC)   • Other hyperlipidemia   • Vitamin D deficiency   • Erectile dysfunction   • Chronic fatigue   • Type 2 diabetes mellitus with ophthalmic complication ([facility])   • Skin lesion of chest wall   • Slow transit constipation   • Benign prostatic hyperplasia with nocturia   • History of basal cell carcinoma   • High blood pressure associated with diabetes ([facility])   • Acquired hypothyroidism   • Hypertensive urgency   • Recurrent strokes ([facility])   • Urinary retention   • Pneumonia   • Acute on chronic combined systolic and diastolic congestive heart failure ([facility])   • Acute respiratory failure with hypoxia ([facility])   • Suspected sleep apnea   • Pleural effusion   • YAW (obstructive sleep apnea)   • Coronary artery disease involving native coronary artery of native heart without angina pectoris   • Chronically elevated troponin   • Colon cancer screening   • Enlarged lymph nodes   • Functional gait abnormality   • History of myocardial infarction   • Hospital discharge follow-up   • Insomnia   • Iron deficiency anemia   • Major depression, recurrent (HCC)   • Anemia, chronic renal failure, stage 3 (moderate) ([facility])   • Essential hypertension   • Adverse effect of iron and its compounds, initial encounter   • Acute on chronic renal insufficiency   • Debility   • Falls frequently   • Acute pain of right shoulder   • Acute on chronic anemia   • Heme positive stool   • Neurogenic orthostatic hypotension (HCC)   • Symptomatic anemia   • History of colon polyps   • Helicobacter pylori gastritis   • Esophagitis   • Sleep  related hypoxia   • Embolic stroke (HCC)   • Patent foramen ovale   • Pleural effusion, bilateral   • Cardiomyopathy (HCC)   • Lower abdominal pain   • Diarrhea   • CKD (chronic kidney disease) stage 4, GFR 15-29 ml/min (HCC)   • Hypertension not at goal     Past Medical History:   Diagnosis Date   • Acquired hypothyroidism    • Acute congestive heart failure (HCC)    • Acute respiratory failure with hypoxia (HCC)    • Acute sinusitis    • SYLVAIN (acute kidney injury) (HCC)     on CKD   • Anemia    • Arthritis    • CAD (coronary artery disease)    • Cancer (HCC)     skin   • Chronic combined systolic and diastolic congestive heart failure (HCC)    • Chronic ischemic heart disease    • CKD (chronic kidney disease)    • COPD (chronic obstructive pulmonary disease) (HCC)    • Depression    • Diabetes mellitus type 2, uncontrolled, without complications    • Disease of thyroid gland    • Fatigue    • Frequent PVCs    • Heart attack (HCC)    • History of coronary artery disease     with remote history of bypass surgery in 2001   • History of transfusion    • Hyperglycemia    • Hyperlipidemia    • Hypertension    • Hypertensive encephalopathy    • Mental status change     Acute   • YAW on CPAP    • Pleural effusion    • Pneumonia    • RBBB (right bundle branch block)    • Screening for prostate cancer 2011   • Stroke (HCC)    • TIA (transient ischemic attack)    • Type 2 diabetes mellitus (HCC)    • Urinary retention    • Vitamin D deficiency      Past Surgical History:   Procedure Laterality Date   • APPENDECTOMY N/A 02/14/2016    Dr. Alexey Dodson   • COLONOSCOPY      over 20 years ago.  no polyps    • COLONOSCOPY N/A 9/18/2018    Procedure: COLONOSCOPY;  Surgeon: Jose Enrique Louie MD;  Location: [facility] ENDOSCOPY;  Service: Gastroenterology   • COLONOSCOPY N/A 9/19/2018    IH, EH, polyps (TAs w/low grade dysplasia)   • COLONOSCOPY N/A 6/1/2020    Procedure: COLONOSCOPY;  Surgeon: Jose Enrique Louie MD;  Location:   [facility] ENDOSCOPY;  Service: Gastroenterology;  Laterality: N/A;  Pre op-Anemia, Melena, History of Polyps  Post op-To Cecum/TI, Polyp, Poor Prep   • CORONARY ARTERY BYPASS GRAFT  11/2001   • ENDOSCOPY N/A 5/29/2020    Procedure: ESOPHAGOGASTRODUODENOSCOPY with biopsies;  Surgeon: Amanda Lovelace MD;  Location: [facility] ENDOSCOPY;  Service: Gastroenterology;  Laterality: N/A;  pre-anemia, dark stools  post-esophagitis, hiatal hernia   • ENDOSCOPY N/A 9/15/2020    Procedure: ESOPHAGOGASTRODUODENOSCOPY WITH BIOPSIES;  Surgeon: Jose Enrique Louie MD;  Location: [facility] ENDOSCOPY;  Service: Gastroenterology;  Laterality: N/A;  pre: history of melena and esophagitis  post: mild esophagitis and gastritis, small hiatal hernia   • HERNIA REPAIR Left 12/05/2019   • TONSILLECTOMY     • VASECTOMY        General Information     Row Name 02/03/22 1213          OT Time and Intention    Document Type evaluation  -     Mode of Treatment individual therapy; occupational therapy  -     Row Name 02/03/22 1213          General Information    Patient Profile Reviewed yes  -BL     Existing Precautions/Restrictions fall  -BL     Barriers to Rehab medically complex  -     Row Name 02/03/22 1213          Living Environment    Lives With spouse; child(carlos), adult  -     Row Name 02/03/22 1213          Stairs Within Home, Primary    Stairs, Within Home, Primary tri level home with 2 fights of stairs  -     Row Name 02/03/22 1213          Cognition    Orientation Status (Cognition) oriented x 3  -BL     Row Name 02/03/22 1213          Safety Issues, Functional Mobility    Safety Issues Affecting Function (Mobility) insight into deficits/self-awareness; awareness of need for assistance; judgment; safety precaution awareness; sequencing abilities  -     Impairments Affecting Function (Mobility) balance; endurance/activity tolerance; strength  -BL           User Key  (r) = Recorded By, (t) = Taken By, (c) = Cosigned By    Initials  Name Provider Type    Pauly Scales OT Occupational Therapist                 Mobility/ADL's     Row Name 02/03/22 1215          Bed Mobility    Bed Mobility supine-sit  -     Supine-Sit [place] (Bed Mobility) supervision  -     Assistive Device (Bed Mobility) bed rails; head of bed elevated  -     Row Name 02/03/22 1215          Transfers    Transfers sit-stand transfer; bed-chair transfer  -     Bed-Chair [place] (Transfers) minimum assist (75% patient effort); verbal cues  -     Assistive Device (Bed-Chair Transfers) walker, front-wheeled  -     Sit-Stand [place] (Transfers) contact guard; verbal cues  -     Row Name 02/03/22 1215          Sit-Stand Transfer    Assistive Device (Sit-Stand Transfers) walker, front-wheeled  -[facility] Name 02/03/22 1215          Activities of Daily Living    BADL Assessment/Intervention lower body dressing; grooming  -[facility] Name 02/03/22 1215          Lower Body Dressing Assessment/Training    [place] Level (Lower Body Dressing) don; socks; supervision  -     Position (Lower Body Dressing) supine  -     Row Name 02/03/22 1215          Grooming Assessment/Training    [place] Level (Grooming) wash face, hands; set up; verbal cues  -     Position (Grooming) supported sitting  -           User Key  (r) = Recorded By, (t) = Taken By, (c) = Cosigned By    Initials Name Provider Type    Pauly Scales OT Occupational Therapist               Obj/Interventions     Row Name 02/03/22 1216          Sensory Assessment (Somatosensory)    Sensory Assessment (Somatosensory) UE sensation intact  -[facility] Name 02/03/22 1216          Vision Assessment/Intervention    Visual Impairment/Limitations WFL; corrective lenses full-time  -     Row Name 02/03/22 1216          Range of Motion Comprehensive    General Range of Motion bilateral upper extremity ROM WFL  -     Row Name 02/03/22 1216          Strength Comprehensive (MMT)     Comment, General Manual Muscle Testing (MMT) Assessment BUE 3/5  -BL     Row Name 02/03/22 1216          Balance    Balance Assessment sitting static balance; sitting dynamic balance; sit to stand dynamic balance; standing static balance; standing dynamic balance  -BL     Static Sitting Balance WNL; unsupported; sitting, edge of bed  -BL     Dynamic Sitting Balance WFL; unsupported; long sitting  -BL     Sit to Stand Dynamic Balance mild impairment; supported; standing  -BL     Static Standing Balance mild impairment; supported; standing  -BL     Dynamic Standing Balance mild impairment; supported; standing  -BL     Balance Interventions sitting; standing; sit to stand; supported; static; dynamic; occupation based/functional task  -BL           User Key  (r) = Recorded By, (t) = Taken By, (c) = Cosigned By    Initials Name Provider Type    BL Pauly Moran OT Occupational Therapist               Goals/Plan     Row Name 02/03/22 1221          Bed Mobility Goal 1 (OT)    Activity/Assistive Device (Bed Mobility Goal 1, OT) bed mobility activities, all  -BL     [place] Level/Cues Needed (Bed Mobility Goal 1, OT) modified independence  -BL     Time Frame (Bed Mobility Goal 1, OT) short term goal (STG); 2 weeks  -BL     Progress/Outcomes (Bed Mobility Goal 1, OT) continuing progress toward goal  -BL     Row Name 02/03/22 1221          Transfer Goal 1 (OT)    Activity/Assistive Device (Transfer Goal 1, OT) sit-to-stand/stand-to-sit; bed-to-chair/chair-to-bed; commode, 3-in-1  -BL     [place] Level/Cues Needed (Transfer Goal 1, OT) standby assist  -BL     Time Frame (Transfer Goal 1, OT) short term goal (STG); 2 weeks  -BL     Progress/Outcome (Transfer Goal 1, OT) continuing progress toward goal  -BL     Row Name 02/03/22 1221          Grooming Goal 1 (OT)    Activity/Device (Grooming Goal 1, OT) grooming skills, all  -BL     [place] (Grooming Goal 1, OT) modified independence  -BL     Time Frame  (Grooming Goal 1, OT) short term goal (STG); 2 weeks  -BL     Progress/Outcome (Grooming Goal 1, OT) continuing progress toward goal  -BL     Row Name 02/03/22 1221          Strength Goal 1 (OT)    Strength Goal 1 (OT) Pt will be independent with HEP prior to D/C.  -BL     Time Frame (Strength Goal 1, OT) short term goal (STG); 2 weeks  -BL     Progress/Outcome (Strength Goal 1, OT) continuing progress toward goal  -BL     Row Name 02/03/22 1221          Therapy Assessment/Plan (OT)    Planned Therapy Interventions (OT) BADL retraining; IADL retraining; occupation/activity based interventions; patient/caregiver education/training; transfer/mobility retraining; strengthening exercise; ROM/therapeutic exercise  -BL           User Key  (r) = Recorded By, (t) = Taken By, (c) = Cosigned By    Initials Name Provider Type    Pauly Scales OT Occupational Therapist               Clinical Impression     Row Name 02/03/22 1217          Pain Assessment    Additional Documentation Pain Scale: Numbers Pre/Post-Treatment (Group)  -BL     Row Name 02/03/22 1217          Pain Scale: Numbers Pre/Post-Treatment    Pretreatment Pain Rating 0/10 - no pain  -BL     Posttreatment Pain Rating 0/10 - no pain  -BL     Row Name 02/03/22 1217          Plan of Care Review    Plan of Care Reviewed With patient  -BL     Progress no change  -BL     Outcome Summary Pt seen by OT this date for evaluation. Pt is a 65 y/o male admit to [facility] for generalized weakness and fatigue. PMHx includes hypertension, upper thyroidism, hyperlipidemia, CHF, CKD stage IV, DM 2, chronic anticoagulation with history of CVA, anemia, CAD, YAW and COPD. Pt reports that he lives at home with spouse and is independent with all ADLs/IADLs at baseline. Pt rpeorts that he does use a single point cane and reports having ~3 falls within the past 6 months. Upon arrival pt lying supine in bed, no c/o pain, A&O x4. Pt completed LB dressing long sitting in bed with  Supervision and sup>sit with Supervision as well. Pt performed sit>stand transition with use of rolling walker and Min A to transfer from bed>chair. Once in chair pt completed grooming task with S/U. Pt left sitting up in chair, needs in reach, alarm on. Pt to continue with skilled OT services to address goals and deficits. OT wore mask, gloves, glasses, hand hygiene performed.  -BL     Row Name 02/03/22 1217          Therapy Assessment/Plan (OT)    Rehab Potential (OT) good, to achieve stated therapy goals  -BL     Criteria for Skilled Therapeutic Interventions Met (OT) skilled treatment is necessary  -BL     Therapy Frequency (OT) 3 times/wk  -BL     Row Name 02/03/22 1217          Vital Signs    Pre Patient Position Supine  -BL     Intra Patient Position Standing  -BL     Post Patient Position Sitting  -BL     Row Name 02/03/22 1217          Positioning and Restraints    Pre-Treatment Position in bed  -BL     Post Treatment Position chair  -BL     In Chair sitting; call light within reach; encouraged to call for assist; exit alarm on  -BL           User Key  (r) = Recorded By, (t) = Taken By, (c) = Cosigned By    Initials Name Provider Type    BL Pauly Moran, OT Occupational Therapist               Outcome Measures     Row Name 02/03/22 1222          How much help from another is currently needed...    Putting on and taking off regular lower body clothing? 4  -BL     Bathing (including washing, rinsing, and drying) 3  -BL     Toileting (which includes using toilet bed pan or urinal) 3  -BL     Putting on and taking off regular upper body clothing 4  -BL     Taking care of personal grooming (such as brushing teeth) 3  -BL     Eating meals 3  -BL     AM-PAC 6 Clicks Score (OT) 20  -BL     Row Name 02/03/22 0823          How much help from another person do you currently need...    Turning from your back to your side while in flat bed without using bedrails? 4  -MS     Moving from lying on back to sitting on  the side of a flat bed without bedrails? 4  -MS     Moving to and from a bed to a chair (including a wheelchair)? 3  -MS     Standing up from a chair using your arms (e.g., wheelchair, bedside chair)? 3  -MS     Climbing 3-5 steps with a railing? 2  -MS     To walk in hospital room? 3  -MS     AM-PAC 6 Clicks Score (PT) 19  -MS     Row Name 02/03/22 1222          Functional Assessment    Outcome Measure Options AM-PAC 6 Clicks Daily Activity (OT)  -BL           User Key  (r) = Recorded By, (t) = Taken By, (c) = Cosigned By    Initials Name Provider Type    Jose Enrique Trujillo, RN Registered Nurse    Pauly Scales OT Occupational Therapist                Occupational Therapy Education                 Title: PT OT SLP Therapies (Done)     Topic: Occupational Therapy (Done)     Point: ADL training (Done)     Description:   Instruct learner(s) on proper safety adaptation and remediation techniques during self care or transfers.   Instruct in proper use of assistive devices.              Learning Progress Summary           Patient Acceptance, E, VU by SIMONA at 2/3/2022 1223    Comment: The role of OT    Acceptance, E,TB, VU by SARAH at 2/2/2022 1126                   Point: Home exercise program (Done)     Description:   Instruct learner(s) on appropriate technique for monitoring, assisting and/or progressing therapeutic exercises/activities.              Learning Progress Summary           Patient Acceptance, E,TB, VU by SARAH at 2/2/2022 1126                   Point: Precautions (Done)     Description:   Instruct learner(s) on prescribed precautions during self-care and functional transfers.              Learning Progress Summary           Patient Acceptance, E,TB, VU by SARAH at 2/2/2022 1126                   Point: Body mechanics (Done)     Description:   Instruct learner(s) on proper positioning and spine alignment during self-care, functional mobility activities and/or exercises.              Learning Progress Summary            Patient Acceptance, E,TB, VU by  at 2/2/2022 1126                               User Key     Initials Effective Dates Name Provider Type Discipline     01/05/21 -  Pauly Moran OT Occupational Therapist OT     11/29/21 -  Blanche Truong RN Registered Nurse Nurse              OT Recommendation and Plan  Planned Therapy Interventions (OT): BADL retraining, IADL retraining, occupation/activity based interventions, patient/caregiver education/training, transfer/mobility retraining, strengthening exercise, ROM/therapeutic exercise  Therapy Frequency (OT): 3 times/wk  Plan of Care Review  Plan of Care Reviewed With: patient  Progress: no change  Outcome Summary: Pt seen by OT this date for evaluation. Pt is a 65 y/o male admit to [facility] for generalized weakness and fatigue. PMHx includes hypertension, upper thyroidism, hyperlipidemia, CHF, CKD stage IV, DM 2, chronic anticoagulation with history of CVA, anemia, CAD, YAW and COPD. Pt reports that he lives at home with spouse and is independent with all ADLs/IADLs at baseline. Pt rpeorts that he does use a single point cane and reports having ~3 falls within the past 6 months. Upon arrival pt lying supine in bed, no c/o pain, A&O x4. Pt completed LB dressing long sitting in bed with Supervision and sup>sit with Supervision as well. Pt performed sit>stand transition with use of rolling walker and Min A to transfer from bed>chair. Once in chair pt completed grooming task with S/U. Pt left sitting up in chair, needs in reach, alarm on. Pt to continue with skilled OT services to address goals and deficits. OT wore mask, gloves, glasses, hand hygiene performed.     Time Calculation:    Time Calculation- OT     Row Name 02/03/22 1223             Time Calculation- OT    OT Start Time 0841  -BL      OT Stop Time 0859  -BL      OT Time Calculation (min) 18 min  -BL      Total Timed Code Minutes- OT 12 minute(s)  -BL      OT Received On 02/03/22  -BL      OT -  Next Appointment 02/07/22  -BL      OT Goal Re-Cert Due Date 02/17/22  -BL              Timed Charges    30864 - OT Therapeutic Activity Minutes 12  -BL              Untimed Charges    OT Eval/Re-eval Minutes 6  -BL              Total Minutes    Timed Charges Total Minutes 12  -BL      Untimed Charges Total Minutes 6  -BL       Total Minutes 18  -BL            User Key  (r) = Recorded By, (t) = Taken By, (c) = Cosigned By    Initials Name Provider Type     Pauly Moran OT Occupational Therapist              Therapy Charges for Today     Code Description Service Date Service Provider Modifiers Qty    [id number]  OT THERAPEUTIC ACT EA 15 MIN 2/3/2022 Pauly Moran OT GO 1    [id number] HC OT EVAL LOW COMPLEXITY 2 2/3/2022 Pauly Moran OT GO 1               Pauly Moran OT  2/3/2022

## 2022-02-03 NOTE — PROGRESS NOTES
"Daily progress note    Chief complaint  Doing same  Still fatigue tired and weak  No new complaints  Any chest pain shortness of breath palpitation    History of present illness  66-year-old white male with very complex past medical history including diabetes hypertension hyperlipidemia chronic kidney disease and patent foramen ovale on anticoagulation admitted through emergency room with fatigue tired generalized weakness.  Patient has increased shortness of breath chest tightness cough for last 3 to 4 days.  Patient denies any fever chills abdominal pain nausea vomiting diarrhea.  Patient ran out of medication for last 1 week.  Patient admitted to the observation unit due to hypertensive urgency and blood pressure stabilized but continued to have symptoms admitted to the monitored bed and I am asked to follow medical problems and take over the care.     REVIEW OF SYSTEMS  All systems reviewed and negative except for those discussed in HPI.      PHYSICAL EXAM  Blood pressure 119/67, pulse 65, temperature 98.5 °F (36.9 °C), temperature source Oral, resp. rate 18, height 182.9 cm (72\"), weight 81.6 kg (180 lb), SpO2 97 %.    Constitutional:       Appearance: He is ill-appearing.   HENT:      Head: Normocephalic and atraumatic.      Mouth/Throat:      Mouth: Mucous membranes are moist.   Eyes:      Extraocular Movements: Extraocular movements intact.      Pupils: Pupils are equal, round, and reactive to light.   Cardiovascular:      Rate and Rhythm: Regular rhythm. Bradycardia present.   Pulmonary:      Effort: Pulmonary effort is normal.      Breath sounds: Normal breath sounds.   Abdominal:      General: There is no distension.      Palpations: Abdomen is soft.      Tenderness: There is no abdominal tenderness.   Musculoskeletal:      Cervical back: Normal range of motion.   Skin:     General: Skin is warm and dry.      Coloration: Skin is pale.   Neurological:      General: No focal deficit present.      Mental " Status: He is alert and oriented to person, place, and time.   Psychiatric:         Mood and Affect: Mood normal.         Behavior: Behavior normal.         Thought Content: Thought content normal.         Judgment: Judgment normal.      LABS  Lab Results (last 24 hours)     Procedure Component Value Units Date/Time    POC Glucose Once [[id number]]  (Abnormal) Collected: 02/03/22 0956    Specimen: Blood Updated: 02/03/22 0957     Glucose 213 mg/dL      Comment: Meter: [id number] : [id number] MichaelJoseJosh RAMIREZ       TSH [[id number]]  (Normal) Collected: 02/03/22 0507    Specimen: Blood Updated: 02/03/22 0717     TSH 1.450 uIU/mL     Troponin [[id number]]  (Abnormal) Collected: 02/03/22 0507    Specimen: Blood Updated: 02/03/22 0637     Troponin T 0.111 ng/mL     Narrative:      Troponin T Reference Range:  <= 0.03 ng/mL-   Negative for AMI  >0.03 ng/mL-     Abnormal for myocardial necrosis.  Clinicians would have to utilize clinical acumen, EKG, Troponin and serial changes to determine if it is an Acute Myocardial Infarction or myocardial injury due to an underlying chronic condition.       Results may be falsely decreased if patient taking Biotin.      Comprehensive Metabolic Panel [[id number]]  (Abnormal) Collected: 02/03/22 0507    Specimen: Blood Updated: 02/03/22 0617     Glucose 115 mg/dL      BUN 62 mg/dL      Creatinine 3.00 mg/dL      Sodium 139 mmol/L      Potassium 3.7 mmol/L      Chloride 102 mmol/L      CO2 24.9 mmol/L      Calcium 8.7 mg/dL      Total Protein 5.5 g/dL      Albumin 3.20 g/dL      ALT (SGPT) 17 U/L      AST (SGOT) 14 U/L      Alkaline Phosphatase 79 U/L      Total Bilirubin 0.2 mg/dL      eGFR Non African Amer 21 mL/min/1.73      Globulin 2.3 gm/dL      A/G Ratio 1.4 g/dL      BUN/Creatinine Ratio 20.7     Anion Gap 12.1 mmol/L     Narrative:      GFR Normal >60  Chronic Kidney Disease <60  Kidney Failure <15      Lipid Panel [[id number]]  (Abnormal) Collected: 02/03/22 0507     Specimen: Blood Updated: 02/03/22 0617     Total Cholesterol 256 mg/dL      Triglycerides 87 mg/dL      HDL Cholesterol 48 mg/dL      LDL Cholesterol  193 mg/dL      VLDL Cholesterol 15 mg/dL      LDL/HDL Ratio 3.97    Narrative:      Cholesterol Reference Ranges  (U.[place]. Department of Health and Human Services ATP III Classifications)    Desirable          <200 mg/dL  Borderline High    200-239 mg/dL  High Risk          >240 mg/dL      Triglyceride Reference Ranges  (U.S. Department of Health and Human Services ATP III Classifications)    Normal           <150 mg/dL  Borderline High  150-199 mg/dL  High             200-499 mg/dL  Very High        >500 mg/dL    HDL Reference Ranges  (U.S. Department of Health and Human Services ATP III Classifications)    Low     <40 mg/dl (major risk factor for CHD)  High    >60 mg/dl ('negative' risk factor for CHD)        LDL Reference Ranges  (U.S. Department of Health and Human Services ATP III Classifications)    Optimal          <100 mg/dL  Near Optimal     100-129 mg/dL  Borderline High  130-159 mg/dL  High             160-189 mg/dL  Very High        >189 mg/dL    Hemoglobin A1c [[id number]]  (Abnormal) Collected: 02/03/22 0507    Specimen: Blood Updated: 02/03/22 0600     Hemoglobin A1C 9.38 %     Narrative:      Hemoglobin A1C Ranges:    Increased Risk for Diabetes  5.7% to 6.4%  Diabetes                     >= 6.5%  Diabetic Goal                < 7.0%    POC Glucose Once [[id number]]  (Normal) Collected: 02/03/22 0544    Specimen: Blood Updated: 02/03/22 0557     Glucose 115 mg/dL      Comment: Meter: [id number] : [id number] Julio C FONG       CBC & Differential [[id number]]  (Abnormal) Collected: 02/03/22 0507    Specimen: Blood Updated: 02/03/22 0547    Narrative:      The following orders were created for panel order CBC & Differential.  Procedure                               Abnormality         Status                     ---------                                -----------         ------                     CBC Auto Differential[[id number]]        Abnormal            Final result                 Please view results for these tests on the individual orders.    CBC Auto Differential [[id number]]  (Abnormal) Collected: 02/03/22 0507    Specimen: Blood Updated: 02/03/22 0547     WBC 5.89 10*3/mm3      RBC 4.38 10*6/mm3      Hemoglobin 11.5 g/dL      Hematocrit 37.0 %      MCV 84.5 fL      MCH 26.3 pg      MCHC 31.1 g/dL      RDW 14.2 %      RDW-SD 44.2 fl      MPV 10.7 fL      Platelets 198 10*3/mm3      Neutrophil % 63.1 %      Lymphocyte % 21.9 %      Monocyte % 10.0 %      Eosinophil % 4.4 %      Basophil % 0.3 %      Immature Grans % 0.3 %      Neutrophils, Absolute 3.71 10*3/mm3      Lymphocytes, Absolute 1.29 10*3/mm3      Monocytes, Absolute 0.59 10*3/mm3      Eosinophils, Absolute 0.26 10*3/mm3      Basophils, Absolute 0.02 10*3/mm3      Immature Grans, Absolute 0.02 10*3/mm3      nRBC 0.0 /100 WBC     POC Glucose Once [[id number]]  (Abnormal) Collected: 02/02/22 2047    Specimen: Blood Updated: 02/02/22 2050     Glucose 216 mg/dL      Comment: Meter: [id number] : [id number] Mickey Valencia RN       POC Glucose Once [[id number]]  (Normal) Collected: 02/02/22 1615    Specimen: Blood Updated: 02/02/22 1616     Glucose 81 mg/dL      Comment: Meter: [id number] : [id number] Chalino RAMIREZ              XR CHEST 1 VW-     Clinical: Fatigue     COMPARISON examination 12/15/2021     FINDINGS: Median sternotomy wires and vascular markers in position.  Cardiac size upper limits of normal. No pleural effusion, vascular  congestion or acute airspace disease is demonstrated.     CONCLUSION: No active disease of the chest     This report was finalized on 2/1/2022 3:27 PM by Dr. Bryan Burnham          ECG 12 Lead  Component   Ref Range & Units 2/2/22 0342 2/1/22 1535 12/15/21 1248 5/20/21 2206   QT Interval   ms 482  491  511  469              HEART RATE= 60  bpm  RR Interval= 1000   ms  IL Interval= 202  ms  P Horizontal Axis= -3  deg  P Front Axis= 29  deg  QRSD Interval= 155  ms  QT Interval= 482  ms  QRS Axis= -76  deg  T Wave Axis= 80  deg  - ABNORMAL ECG -  Sinus rhythm  Right bundle branch block             Current Facility-Administered Medications:   •  apixaban (ELIQUIS) tablet 2.5 mg, 2.5 mg, Oral, Q12H, Qadah, Abdalhakim, APRN, 2.5 mg at 02/03/22 0834  •  aspirin EC tablet 81 mg, 81 mg, Oral, Daily, Qadah, Abdalhakim, APRN, 81 mg at 02/03/22 0834  •  atorvastatin (LIPITOR) tablet 10 mg, 10 mg, Oral, Nightly, Josue Pickens MD, 10 mg at 02/02/22 2106  •  buPROPion XL (WELLBUTRIN XL) 24 hr tablet 150 mg, 150 mg, Oral, QAM, Qadah, Abdalhakim, APRN, 150 mg at 02/03/22 0608  •  carvedilol (COREG) tablet 6.25 mg, 6.25 mg, Oral, BID With Meals, Josue Pickens MD, 6.25 mg at 02/03/22 0834  •  famotidine (PEPCID) tablet 20 mg, 20 mg, Oral, BID, Josue Pickens MD, 20 mg at 02/03/22 0834  •  insulin glargine (LANTUS, SEMGLEE) injection 10 Units, 10 Units, Subcutaneous, Nightly, Josue Pickens MD, 10 Units at 02/02/22 2108  •  insulin lispro (ADMELOG) injection 0-9 Units, 0-9 Units, Subcutaneous, 4x Daily With Meals & Nightly, Josue Pickens MD, 4 Units at 02/03/22 1157  •  levothyroxine (SYNTHROID, LEVOTHROID) tablet 75 mcg, 75 mcg, Oral, Daily, Qadah, Abdalhakim, APRN, 75 mcg at 02/03/22 0608  •  ondansetron (ZOFRAN) injection 4 mg, 4 mg, Intravenous, Q6H PRN, Qadah, Abdalhakim, APRN  •  sodium chloride 0.9 % flush 10 mL, 10 mL, Intravenous, PRN, Juan Eaton APRN  •  tamsulosin (FLOMAX) 24 hr capsule 0.4 mg, 0.4 mg, Oral, Nightly, Juan Eaton APRN, 0.4 mg at 02/02/22 2106  •  torsemide (DEMADEX) tablet 20 mg, 20 mg, Oral, BID, Josue Pickens MD, 20 mg at 02/03/22 0834    ASSESSMENT  Hypertensive urgency  Elevated troponin with known coronary artery disease  Congestive heart failure  Diabetes mellitus  Hypertension  Hyperlipidemia  Chronic kidney disease  Gastroesophageal reflux  disease  Noncompliance with medications    PLAN  CPM  Stabilize blood pressure  Accu-Cheks sliding scale insulin  Nephrology consult appreciated  Cardiology to follow patient  Adjust home medications  Stress ulcer DVT prophylaxis  Supportive care  PT/OT  Discussed with nursing staff  Follow closely and further recommendation according to hospital course    ORA MCDONALD MD

## 2022-02-03 NOTE — PLAN OF CARE
Problem: Adult Inpatient Plan of Care  Goal: Plan of Care Review  Outcome: Ongoing, Progressing  Flowsheets (Taken 2/3/2022 1459)  Progress: improving  Plan of Care Reviewed With: patient  Outcome Summary: Patient has been pleasant and cooperative during shift. AOx4, room air, assist x1. PT ambulated patient to hair. No complaints of pain, nausea or SOA. Will continue to monitor and assist patient as needed.  Goal: Patient-Specific Goal (Individualized)  Outcome: Ongoing, Progressing  Goal: Absence of Hospital-Acquired Illness or Injury  Outcome: Ongoing, Progressing  Intervention: Identify and Manage Fall Risk  Recent Flowsheet Documentation  Taken 2/3/2022 1400 by Jose Enrique Daniel RN  Safety Promotion/Fall Prevention: patient off unit  Taken 2/3/2022 1155 by Jose Enrique Daniel RN  Safety Promotion/Fall Prevention:   assistive device/personal items within reach   fall prevention program maintained   nonskid shoes/slippers when out of bed   safety round/check completed  Taken 2/3/2022 1000 by Jose Enrique Daniel RN  Safety Promotion/Fall Prevention:   assistive device/personal items within reach   fall prevention program maintained   nonskid shoes/slippers when out of bed   safety round/check completed  Taken 2/3/2022 0823 by Jose Enrique Daniel RN  Safety Promotion/Fall Prevention:   assistive device/personal items within reach   fall prevention program maintained   nonskid shoes/slippers when out of bed   safety round/check completed  Intervention: Prevent Skin Injury  Recent Flowsheet Documentation  Taken 2/3/2022 1155 by Jose Enrique Daniel RN  Body Position: (patient in chair) --  Taken 2/3/2022 1000 by Jose Enrique Daniel RN  Body Position: (patient in chair) --  Taken 2/3/2022 0823 by Jose Enrique Daniel RN  Body Position: supine  Goal: Optimal Comfort and Wellbeing  Outcome: Ongoing, Progressing  Goal: Readiness for Transition of Care  Outcome: Ongoing, Progressing     Problem: Fall Injury Risk  Goal: Absence of  Fall and Fall-Related Injury  Outcome: Ongoing, Progressing  Intervention: Promote Injury-Free Environment  Recent Flowsheet Documentation  Taken 2/3/2022 1400 by Jose Enrique Daniel RN  Safety Promotion/Fall Prevention: patient off unit  Taken 2/3/2022 1155 by Jose Enrique Daniel RN  Safety Promotion/Fall Prevention:   assistive device/personal items within reach   fall prevention program maintained   nonskid shoes/slippers when out of bed   safety round/check completed  Taken 2/3/2022 1000 by Jose Enrique Daniel RN  Safety Promotion/Fall Prevention:   assistive device/personal items within reach   fall prevention program maintained   nonskid shoes/slippers when out of bed   safety round/check completed  Taken 2/3/2022 0823 by Jose Enrique Daniel RN  Safety Promotion/Fall Prevention:   assistive device/personal items within reach   fall prevention program maintained   nonskid shoes/slippers when out of bed   safety round/check completed     Problem: Diabetes Comorbidity  Goal: Blood Glucose Level Within Desired Range  Outcome: Ongoing, Progressing     Problem: COPD Comorbidity  Goal: Maintenance of COPD Symptom Control  Outcome: Ongoing, Progressing     Problem: Hypertension Comorbidity  Goal: Blood Pressure in Desired Range  Outcome: Ongoing, Progressing     Problem: Obstructive Sleep Apnea Risk or Actual (Comorbidity Management)  Goal: Unobstructed Breathing During Sleep  Outcome: Ongoing, Progressing   Goal Outcome Evaluation:  Plan of Care Reviewed With: patient        Progress: improving  Outcome Summary: Patient has been pleasant and cooperative during shift. AOx4, room air, assist x1. PT ambulated patient to hair. No complaints of pain, nausea or SOA. Will continue to monitor and assist patient as needed.

## 2022-02-03 NOTE — PLAN OF CARE
Goal Outcome Evaluation:  Plan of Care Reviewed With: patient        Progress: no change  Outcome Summary: Pt seen by OT this date for evaluation. Pt is a 67 y/o male admit to [facility] for generalized weakness and fatigue. PMHx includes hypertension, upper thyroidism, hyperlipidemia, CHF, CKD stage IV, DM 2, chronic anticoagulation with history of CVA, anemia, CAD, YAW and COPD. Pt reports that he lives at home with spouse and is independent with all ADLs/IADLs at baseline. Pt rpeorts that he does use a single point cane and reports having ~3 falls within the past 6 months. Upon arrival pt lying supine in bed, no c/o pain, A&O x4. Pt completed LB dressing long sitting in bed with Supervision and sup>sit with Supervision as well. Pt performed sit>stand transition with use of rolling walker and Min A to transfer from bed>chair. Once in chair pt completed grooming task with S/U. Pt left sitting up in chair, needs in reach, alarm on. Pt to continue with skilled OT services to address goals and deficits. OT wore mask, gloves, glasses, hand hygiene performed.

## 2022-02-03 NOTE — CONSULTS
[facility] Consult Note      Patient Name: Carlito Mo  : 1955  MRN: [id number]  Primary Care Physician:  lForencia Caballero MD  Referring Physician: Damian Cook MD  Date of admission: 2022    Subjective     Reason for Consult:  SYLVAIN    HPI:   Carilto Mo is a 66 y.o. male with a past medical history of hypertension, poorly controlled diabetes mellitus type 2 and chronic kidney disease stage IV follows Dr. Bryan Huddleston.  Baseline creatinine appears to be around 3 mg/dL.  Patient presented to the emergency department due to increasing chest tightness and shortness of air over the last 3 to 4 days.  Patient ran out of his medications and his blood pressure at time of admission 204/106.  Of note his blood pressure improved since admission and bottomed out yesterday at 76/55.  His last creatinine on record was 3.4 milligrams per deciliter on January 3, 2022.  His her creatinine at time of admission was 3.4 and today 3 mg/dL.  We were consulted to help with management of his kidney function and volume status    Review of Systems:   14 point review of systems is otherwise negative except for mentioned above on HPI    Personal History     Past Medical History:   Diagnosis Date   • Acquired hypothyroidism    • Acute congestive heart failure (HCC)    • Acute respiratory failure with hypoxia (HCC)    • Acute sinusitis    • SYLVAIN (acute kidney injury) ([facility])     on CKD   • Anemia    • Arthritis    • CAD (coronary artery disease)    • Cancer (HCC)     skin   • Chronic combined systolic and diastolic congestive heart failure (HCC)    • Chronic ischemic heart disease    • CKD (chronic kidney disease)    • COPD (chronic obstructive pulmonary disease) ([facility])    • Depression    • Diabetes mellitus type 2, uncontrolled, without complications    • Disease of thyroid gland    • Fatigue    • Frequent PVCs    • Heart attack (HCC)    • History of coronary artery disease     with remote history of  bypass surgery in 2001   • History of transfusion    • Hyperglycemia    • Hyperlipidemia    • Hypertension    • Hypertensive encephalopathy    • Mental status change     Acute   • YAW on CPAP    • Pleural effusion    • Pneumonia    • RBBB (right bundle branch block)    • Screening for prostate cancer 2011   • Stroke (HCC)    • TIA (transient ischemic attack)    • Type 2 diabetes mellitus (HCC)    • Urinary retention    • Vitamin D deficiency        Past Surgical History:   Procedure Laterality Date   • APPENDECTOMY N/A 02/14/2016    Dr. Alexey Dodson   • COLONOSCOPY      over 20 years ago.  no polyps    • COLONOSCOPY N/A 9/18/2018    Procedure: COLONOSCOPY;  Surgeon: Jose Enrique Louie MD;  Location: [facility] ENDOSCOPY;  Service: Gastroenterology   • COLONOSCOPY N/A 9/19/2018    IH, EH, polyps (TAs w/low grade dysplasia)   • COLONOSCOPY N/A 6/1/2020    Procedure: COLONOSCOPY;  Surgeon: Jose Enrique Louie MD;  Location: [facility] ENDOSCOPY;  Service: Gastroenterology;  Laterality: N/A;  Pre op-Anemia, Melena, History of Polyps  Post op-To Cecum/TI, Polyp, Poor Prep   • CORONARY ARTERY BYPASS GRAFT  11/2001   • ENDOSCOPY N/A 5/29/2020    Procedure: ESOPHAGOGASTRODUODENOSCOPY with biopsies;  Surgeon: Amanda Lovelace MD;  Location: [facility] ENDOSCOPY;  Service: Gastroenterology;  Laterality: N/A;  pre-anemia, dark stools  post-esophagitis, hiatal hernia   • ENDOSCOPY N/A 9/15/2020    Procedure: ESOPHAGOGASTRODUODENOSCOPY WITH BIOPSIES;  Surgeon: Jose Enrique Louie MD;  Location: [facility] ENDOSCOPY;  Service: Gastroenterology;  Laterality: N/A;  pre: history of melena and esophagitis  post: mild esophagitis and gastritis, small hiatal hernia   • HERNIA REPAIR Left 12/05/2019   • TONSILLECTOMY     • VASECTOMY         Family History: family history includes Alcohol abuse in his brother and father; Autism in his son; Cancer in his father; Cirrhosis in his brother; Diabetes in his mother and son; Heart disease in his  father; Hypertension in his mother; Kidney failure in his son; Liver cancer (age of onset: 45) in his brother; Macular degeneration in his mother.    Social History:  reports that he has never smoked. He has never used smokeless tobacco. He reports that he does not drink alcohol and does not use drugs.    Home Medications:  Prior to Admission medications    Medication Sig Start Date End Date Taking? Authorizing Provider   aspirin 81 MG EC tablet Take 81 mg by mouth Daily.   Yes Alonzo Dean MD   Cholecalciferol (VITAMIN D3) 5000 units capsule capsule Take 5,000 Units by mouth Daily.   Yes Alonzo Dean MD   ferrous sulfate 325 (65 FE) MG tablet Take 1 tablet by mouth Daily With Breakfast & Dinner. 7/27/21  Yes Kim Hoyos MD PhD   Insulin Glargine (BASAGLAR KWIKPEN) 100 UNIT/ML injection pen Inject 4 Units under the skin into the appropriate area as directed Every Night. Patient says he does not take consistently 5/24/21  Yes Delmar Carmichael MD   insulin glulisine (APIDRA) 100 UNIT/ML injection Inject 2 Units under the skin into the appropriate area as directed 3 (Three) Times a Day Before Meals. Patient says he does not take consistently   Yes Alonzo Dean MD   torsemide (DEMADEX) 20 MG tablet Take 3 tablets by mouth Daily. 12/23/21  Yes Flo Sherman MD   vitamin B-12 (CYANOCOBALAMIN) 1000 MCG tablet TAKE ONE TABLET BY MOUTH DAILY 2/9/21  Yes Kim Hoyos MD PhD   apixaban (ELIQUIS) 2.5 MG tablet tablet Take 1 tablet by mouth Every 12 (Twelve) Hours. Indications: Other - full anticoagulation 12/22/21   Flo Sherman MD   buPROPion XL (WELLBUTRIN XL) 150 MG 24 hr tablet TAKE ONE TABLET BY MOUTH EVERY MORNING 6/8/21   Alonzo Dean MD   carvedilol (COREG) 25 MG tablet Take 0.5 tablets by mouth 2 (Two) Times a Day With Meals. 12/22/21   Flo Sherman MD   folic acid (FOLVITE) 1 MG tablet TAKE ONE TABLET BY MOUTH DAILY 2/9/21   Kim Hoyos,  MD PhD   levothyroxine (SYNTHROID, LEVOTHROID) 75 MCG tablet TAKE ONE TABLET BY MOUTH DAILY 6/8/21   Provider, MD Alonzo   sodium zirconium cyclosilicate (LOKELMA) 10 g pack Mix contents of one packet and take by mouth Daily. 12/23/21   Flo Sherman MD   tamsulosin (FLOMAX) 0.4 MG capsule 24 hr capsule Take 1 capsule by mouth Every Night.    Provider, MD Alonzo       Allergies:  Allergies   Allergen Reactions   • Prozac [Fluoxetine Hcl] Other (See Comments)     shaking       Objective     Vitals:   Temp:  [97.2 °F (36.2 °C)-98.3 °F (36.8 °C)] 98.3 °F (36.8 °C)  Heart Rate:  [55-77] 62  Resp:  [18] 18  BP: ()/(55-83) 119/67    Intake/Output Summary (Last 24 hours) at 2/3/2022 1036  Last data filed at 2/3/2022 0556  Gross per 24 hour   Intake 120 ml   Output 1100 ml   Net -980 ml       Physical Exam:    General Appearance: alert, oriented x 3, no acute distress   Skin: warm and dry  HEENT: oral mucosa normal, nonicteric sclera  Neck: supple, no JVD  Lungs: CTA  Heart: RRR, normal S1 and S2  Abdomen: soft, nontender, nondistended  : no palpable bladder  Extremities: no edema, cyanosis or clubbing  Neuro: normal speech and mental status     Scheduled Meds:     apixaban, 2.5 mg, Oral, Q12H  aspirin, 81 mg, Oral, Daily  atorvastatin, 10 mg, Oral, Nightly  buPROPion XL, 150 mg, Oral, QAM  carvedilol, 6.25 mg, Oral, BID With Meals  famotidine, 20 mg, Oral, BID  insulin glargine, 10 Units, Subcutaneous, Nightly  insulin lispro, 0-9 Units, Subcutaneous, 4x Daily With Meals & Nightly  levothyroxine, 75 mcg, Oral, Daily  tamsulosin, 0.4 mg, Oral, Nightly  torsemide, 20 mg, Oral, BID      IV Meds:        Results Reviewed:   I have personally reviewed the results from the time of this admission to 2/3/2022 10:36 EST     Lab Results   Component Value Date    GLUCOSE 115 (H) 02/03/2022    CALCIUM 8.7 02/03/2022     02/03/2022    K 3.7 02/03/2022    CO2 24.9 02/03/2022     02/03/2022    BUN 62  (H) 02/03/2022    CREATININE 3.00 (H) 02/03/2022    EGFRIFAFRI 74 12/12/2017    EGFRIFNONA 21 (L) 02/03/2022    BCR 20.7 02/03/2022    ANIONGAP 12.1 02/03/2022      Lab Results   Component Value Date    MG 1.8 12/15/2021    PHOS 5.4 (H) 12/22/2021    ALBUMIN 3.20 (L) 02/03/2022           Assessment / Plan     ASSESSMENT:       -Acute kidney injury on chronic kidney disease stage IV: His kidney function has been fluctuating but most recently had been in the range of 3 mg/dl mg/dL. The patient is noncompliant and we had lengthy discussion about importance of compliance.  Creatinine was 3.4 mg/dL on admission and now down to 3 mg/dL     -Proteinuria: Most likely due to diabetic nephropathy.   -Hypertensive urgency: Patient has not been taking his medications.  His blood pressure is much better since admission.  Will need to avoid hypotension  -Diabetes mellitus type 2: Noncompliant with the treatment  -Chronic congestive heart failure last ejection fraction 31-35% with fixed restrictive  And diastolic congestive heart failure  -Anemia of chronic illness:  -Coronary artery disease  -History of stroke  -History of obstructive sleep apnea  -History of noncompliance    PLAN:    -Continue current dose of torsemide  -Continue current blood pressure medications and avoid hypotension  -Encouraged patient to comply with his medical treatment  -Surveillance labs    Thank you for involving us in the care of Carlito Mo.  Please feel free to call with any questions.    Vitor Parmar MD  02/03/22  10:36 EST    [facility]  [phone]

## 2022-02-04 VITALS
SYSTOLIC BLOOD PRESSURE: 121 MMHG | OXYGEN SATURATION: 95 % | WEIGHT: 180 LBS | TEMPERATURE: 98 F | HEART RATE: 59 BPM | BODY MASS INDEX: 24.38 KG/M2 | HEIGHT: 72 IN | RESPIRATION RATE: 16 BRPM | DIASTOLIC BLOOD PRESSURE: 67 MMHG

## 2022-02-04 LAB
ANION GAP SERPL CALCULATED.3IONS-SCNC: 12.2 MMOL/L (ref 5–15)
BASOPHILS # BLD AUTO: 0.01 10*3/MM3 (ref 0–0.2)
BASOPHILS NFR BLD AUTO: 0.1 % (ref 0–1.5)
BUN SERPL-MCNC: 63 MG/DL (ref 8–23)
BUN/CREAT SERPL: 18.9 (ref 7–25)
CALCIUM SPEC-SCNC: 9.2 MG/DL (ref 8.6–10.5)
CHLORIDE SERPL-SCNC: 103 MMOL/L (ref 98–107)
CO2 SERPL-SCNC: 22.8 MMOL/L (ref 22–29)
CREAT SERPL-MCNC: 3.33 MG/DL (ref 0.76–1.27)
DEPRECATED RDW RBC AUTO: 41.2 FL (ref 37–54)
EOSINOPHIL # BLD AUTO: 0.23 10*3/MM3 (ref 0–0.4)
EOSINOPHIL NFR BLD AUTO: 3.4 % (ref 0.3–6.2)
ERYTHROCYTE [DISTWIDTH] IN BLOOD BY AUTOMATED COUNT: 14.2 % (ref 12.3–15.4)
GFR SERPL CREATININE-BSD FRML MDRD: 19 ML/MIN/1.73
GLUCOSE BLDC GLUCOMTR-MCNC: 108 MG/DL (ref 70–130)
GLUCOSE BLDC GLUCOMTR-MCNC: 137 MG/DL (ref 70–130)
GLUCOSE BLDC GLUCOMTR-MCNC: 281 MG/DL (ref 70–130)
GLUCOSE BLDC GLUCOMTR-MCNC: 65 MG/DL (ref 70–130)
GLUCOSE BLDC GLUCOMTR-MCNC: 67 MG/DL (ref 70–130)
GLUCOSE SERPL-MCNC: 67 MG/DL (ref 65–99)
HCT VFR BLD AUTO: 36.6 % (ref 37.5–51)
HGB BLD-MCNC: 12 G/DL (ref 13–17.7)
IMM GRANULOCYTES # BLD AUTO: 0.01 10*3/MM3 (ref 0–0.05)
IMM GRANULOCYTES NFR BLD AUTO: 0.1 % (ref 0–0.5)
LYMPHOCYTES # BLD AUTO: 1.59 10*3/MM3 (ref 0.7–3.1)
LYMPHOCYTES NFR BLD AUTO: 23.5 % (ref 19.6–45.3)
MCH RBC QN AUTO: 26.3 PG (ref 26.6–33)
MCHC RBC AUTO-ENTMCNC: 32.8 G/DL (ref 31.5–35.7)
MCV RBC AUTO: 80.1 FL (ref 79–97)
MONOCYTES # BLD AUTO: 0.67 10*3/MM3 (ref 0.1–0.9)
MONOCYTES NFR BLD AUTO: 9.9 % (ref 5–12)
NEUTROPHILS NFR BLD AUTO: 4.25 10*3/MM3 (ref 1.7–7)
NEUTROPHILS NFR BLD AUTO: 63 % (ref 42.7–76)
NRBC BLD AUTO-RTO: 0 /100 WBC (ref 0–0.2)
PLATELET # BLD AUTO: 227 10*3/MM3 (ref 140–450)
PMV BLD AUTO: 10.9 FL (ref 6–12)
POTASSIUM SERPL-SCNC: 4 MMOL/L (ref 3.5–5.2)
RBC # BLD AUTO: 4.57 10*6/MM3 (ref 4.14–5.8)
SODIUM SERPL-SCNC: 138 MMOL/L (ref 136–145)
WBC NRBC COR # BLD: 6.76 10*3/MM3 (ref 3.4–10.8)

## 2022-02-04 PROCEDURE — 63710000001 INSULIN LISPRO (HUMAN) PER 5 UNITS: Performed by: HOSPITALIST

## 2022-02-04 PROCEDURE — 80048 BASIC METABOLIC PNL TOTAL CA: CPT | Performed by: HOSPITALIST

## 2022-02-04 PROCEDURE — 97110 THERAPEUTIC EXERCISES: CPT

## 2022-02-04 PROCEDURE — 82962 GLUCOSE BLOOD TEST: CPT

## 2022-02-04 PROCEDURE — 97535 SELF CARE MNGMENT TRAINING: CPT

## 2022-02-04 PROCEDURE — 97530 THERAPEUTIC ACTIVITIES: CPT

## 2022-02-04 PROCEDURE — 85025 COMPLETE CBC W/AUTO DIFF WBC: CPT | Performed by: HOSPITALIST

## 2022-02-04 PROCEDURE — 97162 PT EVAL MOD COMPLEX 30 MIN: CPT

## 2022-02-04 RX ORDER — TORSEMIDE 20 MG/1
20 TABLET ORAL 2 TIMES DAILY
Qty: 60 TABLET | Refills: 0 | Status: SHIPPED | OUTPATIENT
Start: 2022-02-04 | End: 2022-03-08 | Stop reason: HOSPADM

## 2022-02-04 RX ORDER — FAMOTIDINE 20 MG/1
20 TABLET, FILM COATED ORAL 2 TIMES DAILY
Qty: 60 TABLET | Refills: 0 | Status: ON HOLD | OUTPATIENT
Start: 2022-02-04 | End: 2022-03-08

## 2022-02-04 RX ORDER — ATORVASTATIN CALCIUM 10 MG/1
10 TABLET, FILM COATED ORAL NIGHTLY
Qty: 30 TABLET | Refills: 0 | Status: SHIPPED | OUTPATIENT
Start: 2022-02-04 | End: 2022-02-08 | Stop reason: SDUPTHER

## 2022-02-04 RX ORDER — CARVEDILOL 3.12 MG/1
3.12 TABLET ORAL 2 TIMES DAILY WITH MEALS
Qty: 60 TABLET | Refills: 0 | Status: SHIPPED | OUTPATIENT
Start: 2022-02-04 | End: 2022-07-29 | Stop reason: HOSPADM

## 2022-02-04 RX ADMIN — CARVEDILOL 3.12 MG: 6.25 TABLET, FILM COATED ORAL at 08:55

## 2022-02-04 RX ADMIN — ASPIRIN 81 MG: 81 TABLET, COATED ORAL at 08:54

## 2022-02-04 RX ADMIN — APIXABAN 2.5 MG: 2.5 TABLET, FILM COATED ORAL at 08:55

## 2022-02-04 RX ADMIN — BUPROPION HYDROCHLORIDE 150 MG: 150 TABLET, EXTENDED RELEASE ORAL at 06:15

## 2022-02-04 RX ADMIN — FAMOTIDINE 20 MG: 20 TABLET, FILM COATED ORAL at 08:54

## 2022-02-04 RX ADMIN — INSULIN LISPRO 6 UNITS: 100 INJECTION, SOLUTION INTRAVENOUS; SUBCUTANEOUS at 12:07

## 2022-02-04 RX ADMIN — LEVOTHYROXINE SODIUM 75 MCG: 0.07 TABLET ORAL at 06:15

## 2022-02-04 RX ADMIN — TORSEMIDE 20 MG: 20 TABLET ORAL at 08:55

## 2022-02-04 NOTE — PLAN OF CARE
Goal Outcome Evaluation:              Outcome Summary: OT-Pt. reports no pain, but has some discomfort B feet at times. BSC transfer completed with CGA and verbal cues for sequence. Oral hygiene/grooming SBA after set up. LE dressing Min A for pants, socks and shoes seated EOB. UE therap. ex. completed to increase functional endurance for self care. Discussed DME at home and safety.Pt. has a wx, and grab bar for tub. Plan to cont. OT and progress as tolerated.    Patient was not wearing a face mask during this therapy encounter. Therapist used appropriate personal protective equipment including mask, gloves, and eye protection.  Mask used was standard procedure mask. Appropriate PPE was worn during the entire therapy session. Hand hygiene was completed before and after therapy session. Patient is not in enhanced droplet precautions.

## 2022-02-04 NOTE — THERAPY EVALUATION
Patient Name: Carlito Mo  : 1955    MRN: [id number]                              Today's Date: 2022       Admit Date: 2022    Visit Dx:     ICD-10-CM ICD-9-CM   1. Hypertension not at goal  I10 401.9   2. Other fatigue  R53.83 780.79   3. Noncompliance with medications  Z91.14 V15.81     Patient Active Problem List   Diagnosis   • Major depressive disorder with single episode, in partial remission (HCC)   • Other hyperlipidemia   • Vitamin D deficiency   • Erectile dysfunction   • Chronic fatigue   • Type 2 diabetes mellitus with ophthalmic complication ([facility])   • Skin lesion of chest wall   • Slow transit constipation   • Benign prostatic hyperplasia with nocturia   • History of basal cell carcinoma   • High blood pressure associated with diabetes ([facility])   • Acquired hypothyroidism   • Hypertensive urgency   • Recurrent strokes ([facility])   • Urinary retention   • Pneumonia   • Acute on chronic combined systolic and diastolic congestive heart failure ([facility])   • Acute respiratory failure with hypoxia ([facility])   • Suspected sleep apnea   • Pleural effusion   • YAW (obstructive sleep apnea)   • Coronary artery disease involving native coronary artery of native heart without angina pectoris   • Chronically elevated troponin   • Colon cancer screening   • Enlarged lymph nodes   • Functional gait abnormality   • History of myocardial infarction   • Hospital discharge follow-up   • Insomnia   • Iron deficiency anemia   • Major depression, recurrent (HCC)   • Anemia, chronic renal failure, stage 3 (moderate) ([facility])   • Essential hypertension   • Adverse effect of iron and its compounds, initial encounter   • Acute on chronic renal insufficiency   • Debility   • Falls frequently   • Acute pain of right shoulder   • Acute on chronic anemia   • Heme positive stool   • Neurogenic orthostatic hypotension (HCC)   • Symptomatic anemia   • History of colon polyps   • Helicobacter pylori gastritis   • Esophagitis   • Sleep  related hypoxia   • Embolic stroke (HCC)   • Patent foramen ovale   • Pleural effusion, bilateral   • Cardiomyopathy (HCC)   • Lower abdominal pain   • Diarrhea   • CKD (chronic kidney disease) stage 4, GFR 15-29 ml/min (HCC)   • Hypertension not at goal     Past Medical History:   Diagnosis Date   • Acquired hypothyroidism    • Acute congestive heart failure (HCC)    • Acute respiratory failure with hypoxia (HCC)    • Acute sinusitis    • SYLVAIN (acute kidney injury) (HCC)     on CKD   • Anemia    • Arthritis    • CAD (coronary artery disease)    • Cancer (HCC)     skin   • Chronic combined systolic and diastolic congestive heart failure (HCC)    • Chronic ischemic heart disease    • CKD (chronic kidney disease)    • COPD (chronic obstructive pulmonary disease) (HCC)    • Depression    • Diabetes mellitus type 2, uncontrolled, without complications    • Disease of thyroid gland    • Fatigue    • Frequent PVCs    • Heart attack (HCC)    • History of coronary artery disease     with remote history of bypass surgery in 2001   • History of transfusion    • Hyperglycemia    • Hyperlipidemia    • Hypertension    • Hypertensive encephalopathy    • Mental status change     Acute   • YAW on CPAP    • Pleural effusion    • Pneumonia    • RBBB (right bundle branch block)    • Screening for prostate cancer 2011   • Stroke (HCC)    • TIA (transient ischemic attack)    • Type 2 diabetes mellitus (HCC)    • Urinary retention    • Vitamin D deficiency      Past Surgical History:   Procedure Laterality Date   • APPENDECTOMY N/A 02/14/2016    Dr. Alexey Dodson   • COLONOSCOPY      over 20 years ago.  no polyps    • COLONOSCOPY N/A 9/18/2018    Procedure: COLONOSCOPY;  Surgeon: Jose Enrique Louie MD;  Location: [facility] ENDOSCOPY;  Service: Gastroenterology   • COLONOSCOPY N/A 9/19/2018    IH, EH, polyps (TAs w/low grade dysplasia)   • COLONOSCOPY N/A 6/1/2020    Procedure: COLONOSCOPY;  Surgeon: Jose Enrique Louie MD;  Location:   [facility] ENDOSCOPY;  Service: Gastroenterology;  Laterality: N/A;  Pre op-Anemia, Melena, History of Polyps  Post op-To Cecum/TI, Polyp, Poor Prep   • CORONARY ARTERY BYPASS GRAFT  11/2001   • ENDOSCOPY N/A 5/29/2020    Procedure: ESOPHAGOGASTRODUODENOSCOPY with biopsies;  Surgeon: Amanda Lovelace MD;  Location: [facility] ENDOSCOPY;  Service: Gastroenterology;  Laterality: N/A;  pre-anemia, dark stools  post-esophagitis, hiatal hernia   • ENDOSCOPY N/A 9/15/2020    Procedure: ESOPHAGOGASTRODUODENOSCOPY WITH BIOPSIES;  Surgeon: Jose Enrique Louie MD;  Location: [facility] ENDOSCOPY;  Service: Gastroenterology;  Laterality: N/A;  pre: history of melena and esophagitis  post: mild esophagitis and gastritis, small hiatal hernia   • HERNIA REPAIR Left 12/05/2019   • TONSILLECTOMY     • VASECTOMY        General Information     [facility] Name 02/04/22 1532          Physical Therapy Time and Intention    Document Type evaluation  -     Mode of Treatment physical therapy; individual therapy  -     Row Name 02/04/22 1532          General Information    Patient Profile Reviewed yes  -     Prior Level of Function independent:; all household mobility; community mobility; ADL's  Utilizes cane for community distances occasionally, will use power scooter if available at places  -     Existing Precautions/Restrictions fall  -     Barriers to Rehab previous functional deficit  -     Row Name 02/04/22 1532          Living Environment    Lives With child(carlos), adult; spouse  -     Row Name 02/04/22 1532          Home Main Entrance    Number of Stairs, Main Entrance none; other (see comments)  None through side door and 3 GREG front door, pt uses side door mainly  -     Stair Railings, Main Entrance railings safe and in good condition  -     Row Name 02/04/22 1532          Stairs Within Home, Primary    Stairs, Within Home, Primary Tri level home with 9 steps to access bedroom  -     Number of Stairs, Within Home, Primary nine   -     Stair Railings, Within Home, Primary railings safe and in good condition  -     Row Name 02/04/22 1532          Cognition    Orientation Status (Cognition) oriented x 3  -     Row Name 02/04/22 1532          Safety Issues, Functional Mobility    Safety Issues Affecting Function (Mobility) insight into deficits/self-awareness; awareness of need for assistance  -     Impairments Affecting Function (Mobility) balance; endurance/activity tolerance; strength  -           User Key  (r) = Recorded By, (t) = Taken By, (c) = Cosigned By    Initials Name Provider Type    Magda Rehman, PT Physical Therapist               Mobility     Row Name 02/04/22 1533          Bed Mobility    Bed Mobility supine-sit; sit-supine  -     Supine-Sit [place] (Bed Mobility) standby assist; verbal cues  -     Sit-Supine [place] (Bed Mobility) not tested; other (see comments)  UIC  -     Assistive Device (Bed Mobility) bed rails; head of bed elevated  -     Row Name 02/04/22 1533          Transfers    Comment (Transfers) sit to stand with no AD and CGA, sit to stand with RW and CGA, VC for hand placement  -     Row Name 02/04/22 1533          Bed-Chair Transfer    Bed-Chair [place] (Transfers) contact guard; verbal cues  -     Assistive Device (Bed-Chair Transfers) walker, front-wheeled  -[facility] Name 02/04/22 1533          Sit-Stand Transfer    Sit-Stand [place] (Transfers) contact guard; verbal cues  -     Assistive Device (Sit-Stand Transfers) walker, front-wheeled  -[facility] Name 02/04/22 1533          Gait/Stairs (Locomotion)    [place] Level (Gait) contact guard; verbal cues  -     Assistive Device (Gait) walker, front-wheeled  -     Distance in Feet (Gait) 30ft x 2  -     Deviations/Abnormal Patterns (Gait) base of support, narrow; stride length decreased; eli decreased  -     Bilateral Gait Deviations forward flexed posture; heel strike decreased  -      "Comment (Gait/Stairs) Ambulated 30ft with HHA and CGA, demonstrated unsteady gait with \"furniture surfing\" attempting to grab wall for balance; Ambulated another 30ft with RW and CGA with improve stability/support and no LOB noted  -           User Key  (r) = Recorded By, (t) = Taken By, (c) = Cosigned By    Initials Name Provider Type     Magda Vivar, DENISSE Physical Therapist               Obj/Interventions     [facility] Name 02/04/22 1535          Range of Motion Comprehensive    General Range of Motion no range of motion deficits identified  -[facility] Name 02/04/22 1535          Strength Comprehensive (MMT)    Comment, General Manual Muscle Testing (MMT) Assessment RLE grossly 3+/5 and LLE grossly 3/5  -[facility] Name 02/04/22 1535          Balance    Balance Assessment sitting static balance; sitting dynamic balance; standing static balance; standing dynamic balance  -     Static Sitting Balance WFL; unsupported; sitting, edge of bed  -     Dynamic Sitting Balance WFL; unsupported; sitting, edge of bed  -     Static Standing Balance WFL; supported; standing  -     Dynamic Standing Balance WFL; mild impairment; supported; standing  -     Comment, Balance improved stability with RW and CGA with standing balance; demonstrated unsteady balance with HHA and no AD  -[facility] Name 02/04/22 1535          Sensory Assessment (Somatosensory)    Sensory Assessment (Somatosensory) sensation intact  -           User Key  (r) = Recorded By, (t) = Taken By, (c) = Cosigned By    Initials Name Provider Type     Magda Vivar, PT Physical Therapist               Goals/Plan     Row Name 02/04/22 1537          Transfer Goal 1 (PT)    Activity/Assistive Device (Transfer Goal 1, PT) transfers, all; walker, rolling  -     [place] Level/Cues Needed (Transfer Goal 1, PT) modified independence  -     Time Frame (Transfer Goal 1, PT) 1 week  -     Row Name 02/04/22 1537          Gait Training Goal 1 (PT)    " Activity/Assistive Device (Gait Training Goal 1, PT) gait (walking locomotion); assistive device use; walker, rolling  -     [place] Level (Gait Training Goal 1, PT) modified independence  -KH     Distance (Gait Training Goal 1, PT) 200ft  -     Time Frame (Gait Training Goal 1, PT) 1 week  -     Row Name 02/04/22 1537          Stairs Goal 1 (PT)    Activity/Assistive Device (Stairs Goal 1, PT) stairs, all skills  -     [place] Level/Cues Needed (Stairs Goal 1, PT) supervision required  -     Number of Stairs (Stairs Goal 1, PT) 9  -KH     Time Frame (Stairs Goal 1, PT) 1 week  -           User Key  (r) = Recorded By, (t) = Taken By, (c) = Cosigned By    Initials Name Provider Type    Magda Rehman, PT Physical Therapist               Clinical Impression     Row Name 02/04/22 1536          Pain    Additional Documentation Pain Scale: Numbers Pre/Post-Treatment (Group)  -     Row Name 02/04/22 1536          Pain Scale: Numbers Pre/Post-Treatment    Pretreatment Pain Rating 0/10 - no pain  -     Posttreatment Pain Rating 0/10 - no pain  -     Row Name 02/04/22 1536          Plan of Care Review    Plan of Care Reviewed With patient  -     Progress improving  -     Outcome Summary Pt is a 65 y/o male presenting to [facility] for generalized weakness and fatigue. PMHx includes hypertension, upper thyroidism, hyperlipidemia, CHF, CKD stage IV, DM 2, chronic anticoagulation with history of CVA, anemia, CAD, YAW and COPD. Pt lives with wife and sons (34 and 38 y/o) in a tri level home. Pt reports going in side door with no GREG and then has 9 steps to access bedroom. Reports independence in mobility with occasional cane use for community distances, no AD for household distances. Reports having ~3 falls within the past 6 months. On eval, pt performed bed mobility with SBA. Ambulated 30ft with no AD and CGA and then another 30ft with RW and CGA. Improved support and stability with RW use. Educated  pt on utilizing RW for improved safety/balance with ambulation. Functional mobility limited 2/2 decrease strength, balance, and activity tolerance. Pt will benefit from skilled PT to improve upon functional status prior to d/c. Anticipate d/c home with family assist and OP PT.  -     Row Name 02/04/22 1536          Therapy Assessment/Plan (PT)    Rehab Potential (PT) fair, will monitor progress closely  -     Criteria for Skilled Interventions Met (PT) yes  -     Predicted Duration of Therapy Intervention (PT) 1 week  -     Row Name 02/04/22 1536          Vital Signs    Pre SpO2 (%) 99  -KH     O2 Delivery Pre Treatment room air  -KH     O2 Delivery Intra Treatment room air  -     Post SpO2 (%) 100  -KH     O2 Delivery Post Treatment room air  -     Row Name 02/04/22 1536          Positioning and Restraints    Pre-Treatment Position in bed  -     Post Treatment Position chair  -KH     In Chair notified nsg; reclined; call light within reach; exit alarm on; encouraged to call for assist  -           User Key  (r) = Recorded By, (t) = Taken By, (c) = Cosigned By    Initials Name Provider Type    Magda Rehman, PT Physical Therapist               Outcome Measures     Row Name 02/04/22 1537 02/04/22 0857       How much help from another person do you currently need...    Turning from your back to your side while in flat bed without using bedrails? 4  -KH 4  -PM    Moving from lying on back to sitting on the side of a flat bed without bedrails? 3  -KH 4  -PM    Moving to and from a bed to a chair (including a wheelchair)? 3  -KH 4  -PM    Standing up from a chair using your arms (e.g., wheelchair, bedside chair)? 3  -KH 4  -PM    Climbing 3-5 steps with a railing? 2  -KH 2  -PM    To walk in hospital room? 3  -KH 2  -PM    AM-PAC 6 Clicks Score (PT) 18  -KH 20  -PM          User Key  (r) = Recorded By, (t) = Taken By, (c) = Cosigned By    Initials Name Provider Type    Tramaine Carter, RN  Registered Nurse    Magda Rehman, PT Physical Therapist                             Physical Therapy Education                 Title: PT OT SLP Therapies (Done)     Topic: Physical Therapy (Done)     Point: Mobility training (Done)     Learning Progress Summary           Patient Acceptance, E, VU,NR by  at 2/4/2022 1538    Acceptance, E,TB, VU by LN at 2/4/2022 1533    Acceptance, E, VU by CW at 2/4/2022 1046    Acceptance, E,TB, VU by NZ at 2/2/2022 1126                   Point: Home exercise program (Done)     Learning Progress Summary           Patient Acceptance, E,TB, VU by LN at 2/4/2022 1533    Acceptance, E, VU by CW at 2/4/2022 1046    Acceptance, E,TB, VU by NZ at 2/2/2022 1126                   Point: Body mechanics (Done)     Learning Progress Summary           Patient Acceptance, E, VU,NR by  at 2/4/2022 1538    Acceptance, E,TB, VU by LN at 2/4/2022 1533    Acceptance, E, VU by CW at 2/4/2022 1046    Acceptance, E,TB, VU by NZ at 2/2/2022 1126                   Point: Precautions (Done)     Learning Progress Summary           Patient Acceptance, E, VU,NR by  at 2/4/2022 1538    Acceptance, E,TB, VU by LN at 2/4/2022 1533    Acceptance, E, VU by CW at 2/4/2022 1046    Acceptance, E,TB, VU by NZ at 2/2/2022 1126                               User Key     Initials Effective Dates Name Provider Type Discipline    MARLENY 06/16/21 -  Carlotta Motley OTR Occupational Therapist OT    LN 06/01/21 -  Xiomy Mcfarland, RN Registered Nurse Nurse    TONY 11/03/21 -  Magda Vivar, PT Physical Therapist PT    SARAH 11/29/21 -  Blanche Truong, RN Registered Nurse Nurse              PT Recommendation and Plan  Planned Therapy Interventions (PT): balance training, bed mobility training, gait training, home exercise program, patient/family education, strengthening, transfer training, stair training  Plan of Care Reviewed With: patient  Progress: improving  Outcome Summary: Pt is a 65 y/o male presenting to [facility] for  generalized weakness and fatigue. PMHx includes hypertension, upper thyroidism, hyperlipidemia, CHF, CKD stage IV, DM 2, chronic anticoagulation with history of CVA, anemia, CAD, YAW and COPD. Pt lives with wife and sons (34 and 38 y/o) in a tri level home. Pt reports going in side door with no GREG and then has 9 steps to access bedroom. Reports independence in mobility with occasional cane use for community distances, no AD for household distances. Reports having ~3 falls within the past 6 months. On eval, pt performed bed mobility with SBA. Ambulated 30ft with no AD and CGA and then another 30ft with RW and CGA. Improved support and stability with RW use. Educated pt on utilizing RW for improved safety/balance with ambulation. Functional mobility limited 2/2 decrease strength, balance, and activity tolerance. Pt will benefit from skilled PT to improve upon functional status prior to d/c. Anticipate d/c home with family assist and OP PT.     Time Calculation:    PT Charges     Row Name 02/04/22 1538             Time Calculation    Start Time 1442  -KH      Stop Time 1504  -KH      Time Calculation (min) 22 min  -KH      PT Received On 02/04/22  -      PT - Next Appointment 02/05/22  -      PT Goal Re-Cert Due Date 02/11/22  -              Time Calculation- PT    Total Timed Code Minutes- PT 14 minute(s)  -KH              Timed Charges    30464 - Gait Training Minutes  14  -KH              Total Minutes    Timed Charges Total Minutes 14  -KH       Total Minutes 14  -KH            User Key  (r) = Recorded By, (t) = Taken By, (c) = Cosigned By    Initials Name Provider Type    Magda Rehman, PT Physical Therapist              Therapy Charges for Today     Code Description Service Date Service Provider Modifiers Qty    04754172153 HC PT EVAL MOD COMPLEXITY 2 2/4/2022 Magda Vivar, PT GP 1    24893708780 HC PT THERAPEUTIC ACT EA 15 MIN 2/4/2022 Magda Vivar, PT GP 1          PT G-Codes  Outcome Measure  Options: AM-PAC 6 Clicks Daily Activity (OT)  AM-PAC 6 Clicks Score (PT): 18  AM-PAC 6 Clicks Score (OT): 20    LOBO JACOB, PT  2/4/2022

## 2022-02-04 NOTE — NURSING NOTE
Glucose levels checked to  65, 67 around 0630, Pt. Alert, having regular diet, snacks and drink provided, rechecked around 0710, , reported to day shift nurse

## 2022-02-04 NOTE — PLAN OF CARE
Goal Outcome Evaluation:         Pt. Alert, oriented x4, v/s stable, 02 sats 98% on room air, no voiced c/o pain, sleeping in bed quietly until this time, no s/s acute distress noted

## 2022-02-04 NOTE — PROGRESS NOTES
[facility] Progress Note      Patient Name: Carlito Mo  : 1955  MRN: [id number]  Primary Care Physician:  Florencia Caballero MD  Date of admission: 2022    Subjective     Interval History:   Seen and examined. No shortness of air or chest pain    Review of Systems:   As noted above    Objective     Vitals:   Temp:  [97.6 °F (36.4 °C)-98.6 °F (37 °C)] 97.6 °F (36.4 °C)  Heart Rate:  [62-65] 62  Resp:  [18] 18  BP: (145-167)/(73-88) 145/73    Intake/Output Summary (Last 24 hours) at 2022 1152  Last data filed at 2022 0857  Gross per 24 hour   Intake 240 ml   Output 1650 ml   Net -1410 ml       Physical Exam:    General Appearance: alert, oriented x 3, no acute distress   Skin: warm and dry  HEENT: oral mucosa normal, nonicteric sclera  Neck: supple, no JVD  Lungs: CTA  Heart: RRR, normal S1 and S2  Abdomen: soft, nontender, nondistended  : no palpable bladder  Extremities: no edema, cyanosis or clubbing  Neuro: normal speech and mental status     Scheduled Meds:     apixaban, 2.5 mg, Oral, Q12H  aspirin, 81 mg, Oral, Daily  atorvastatin, 10 mg, Oral, Nightly  buPROPion XL, 150 mg, Oral, QAM  carvedilol, 3.125 mg, Oral, BID With Meals  famotidine, 20 mg, Oral, BID  insulin glargine, 10 Units, Subcutaneous, Nightly  insulin lispro, 0-9 Units, Subcutaneous, 4x Daily With Meals & Nightly  levothyroxine, 75 mcg, Oral, Daily  tamsulosin, 0.4 mg, Oral, Nightly  torsemide, 20 mg, Oral, BID      IV Meds:        Results Reviewed:   I have personally reviewed the results from the time of this admission to 2022 11:52 EST     Results from last 7 days   Lab Units 22  0528 22  0507 22  0348 22  1519 22  1519   SODIUM mmol/L 138 139 139   < > 140   POTASSIUM mmol/L 4.0 3.7 3.5   < > 3.9   CHLORIDE mmol/L 103 102 103   < > 102   CO2 mmol/L 22.8 24.9 24.3   < > 26.0   BUN mg/dL 63* 62* 57*   < > 62*   CREATININE mg/dL 3.33* 3.00* 3.10*   < > 3.43*    CALCIUM mg/dL 9.2 8.7 9.1   < > 9.1   BILIRUBIN mg/dL  --  0.2  --   --  0.2   ALK PHOS U/L  --  79  --   --  90   ALT (SGPT) U/L  --  17  --   --  23   AST (SGOT) U/L  --  14  --   --  15   GLUCOSE mg/dL 67 115* 164*   < > 227*    < > = values in this interval not displayed.     Estimated Creatinine Clearance: 25.2 mL/min (A) (by C-G formula based on SCr of 3.33 mg/dL (H)).          Results from last 7 days   Lab Units 02/04/22  0528 02/03/22  0507 02/01/22  1519   WBC 10*3/mm3 6.76 5.89 6.28   HEMOGLOBIN g/dL 12.0* 11.5* 12.0*   PLATELETS 10*3/mm3 227 198 204           Assessment / Plan     ASSESSMENT:  -Acute kidney injury on chronic kidney disease stage IV: His kidney function has been fluctuating but most recently had been in the range of 3 mg/dl mg/dL. The patient is noncompliant and we had lengthy discussion about importance of compliance.       -Proteinuria: Most likely due to diabetic nephropathy.   -Hypertensive urgency: Patient has not been taking his medications.  His blood pressure is much better since admission.  Will need to avoid hypotension  -Diabetes mellitus type 2: Noncompliant with the treatment  -Chronic congestive heart failure last ejection fraction 31-35% with fixed restrictive  And diastolic congestive heart failure  -Anemia of chronic illness:  -Coronary artery disease  -History of stroke  -History of obstructive sleep apnea  -History of noncompliance     PLAN:     -Continue current dose of torsemide. Okay to discharge from kidney standpoint to follow-up with nephrology in 2 weeks. Lab in 1 week  -Continue current blood pressure medications and avoid hypotension  -Encouraged patient to comply with his medical treatment  -Surveillance labs      Thank you for involving us in the care of Carlito JANINE Mo.  Please feel free to call with any questions.    Vitor Parmar MD  02/04/22  11:52 EST    [facility]  [phone]

## 2022-02-04 NOTE — DISCHARGE SUMMARY
Discharge summary    Date of admission 2/1/2022  Date of discharge 2/4/2022    Final diagnosis  Hypertensive urgency resolved  Elevated troponin   Congestive heart failure  Diabetes mellitus  Hypertension  Hyperlipidemia  Chronic kidney disease stage IV  Gastroesophageal reflux disease    Discharge medications    Current Facility-Administered Medications:   •  apixaban (ELIQUIS) tablet 2.5 mg, 2.5 mg, Oral, Q12H, Qadah, Abdalhakim, APRN, 2.5 mg at 02/04/22 0855  •  aspirin EC tablet 81 mg, 81 mg, Oral, Daily, Qadah, Abdalhakim, APRN, 81 mg at 02/04/22 0854  •  atorvastatin (LIPITOR) tablet 10 mg, 10 mg, Oral, Nightly, Josue Pickens MD, 10 mg at 02/03/22 2013  •  buPROPion XL (WELLBUTRIN XL) 24 hr tablet 150 mg, 150 mg, Oral, QAM, Qadah, Abdalhasaleemm, APRN, 150 mg at 02/04/22 0615  •  carvedilol (COREG) tablet 3.125 mg, 3.125 mg, Oral, BID With Meals, Josue Pickens MD, 3.125 mg at 02/04/22 0855  •  famotidine (PEPCID) tablet 20 mg, 20 mg, Oral, BID, Josue Pickens MD, 20 mg at 02/04/22 0854  •  insulin glargine (LANTUS, SEMGLEE) injection 10 Units, 10 Units, Subcutaneous, Nightly, Jouse Pickens MD, 10 Units at 02/03/22 2042  •  insulin lispro (ADMELOG) injection 0-9 Units, 0-9 Units, Subcutaneous, 4x Daily With Meals & Nightly, Josue Pickens MD, 6 Units at 02/04/22 1207  •  levothyroxine (SYNTHROID, LEVOTHROID) tablet 75 mcg, 75 mcg, Oral, Daily, Qadaaston, Lorenaalhakim, APRN, 75 mcg at 02/04/22 0615  •  tamsulosin (FLOMAX) 24 hr capsule 0.4 mg, 0.4 mg, Oral, Nightly, Qadah, Abdalhakim, APRN, 0.4 mg at 02/03/22 2013  •  torsemide (DEMADEX) tablet 20 mg, 20 mg, Oral, BID, Josue Pickens MD, 20 mg at 02/04/22 0855     Consults obtained  Cardiology  Nephrology  Diabetic education nurse    Procedures  2D echo which showed ejection fraction 50%    Hospital course  66-year-old white male with very complex past medical history including chronic kidney disease stage IV admitted through emergency room with fatigue generalized weakness  and work-up revealed hypertensive urgency admit for management.  Patient has been noncompliant with medication and his blood pressure medications stabilized.  Patient further evaluated by nephrology and recommend no need for dialysis at this time.  Patient also seen by cardiology for elevated troponin and recommend no further work-up as he has no chest pain and no EKG changes in his troponin is elevated secondary to chronic kidney disease.  Patient back to baseline will be discharged home on current medication.    Discharge diet regular    Activity as tolerated    Medication as above    Follow-up with primary doctor in 1 week and follow with cardiology and nephrology per the instructions and take medication as directed.    ORA MCDONALD MD

## 2022-02-04 NOTE — PLAN OF CARE
Goal Outcome Evaluation:  Plan of Care Reviewed With: patient        Progress: improving  Pt is a 67 y/o male presenting to [facility] for generalized weakness and fatigue. PMHx includes hypertension, upper thyroidism, hyperlipidemia, CHF, CKD stage IV, DM 2, chronic anticoagulation with history of CVA, anemia, CAD, YAW and COPD. Pt lives with wife and sons (34 and 38 y/o) in a tri level home. Pt reports going in side door with no GREG and then has 9 steps to access bedroom. Reports independence in mobility with occasional cane use for community distances, no AD for household distances. Reports having ~3 falls within the past 6 months. On eval, pt performed bed mobility with SBA. Ambulated 30ft with no AD and CGA and then another 30ft with RW and CGA. Improved support and stability with RW use. Educated pt on utilizing RW for improved safety/balance with ambulation. Functional mobility limited 2/2 decrease strength, balance, and activity tolerance. Pt will benefit from skilled PT to improve upon functional status prior to d/c. Anticipate d/c home with family assist and OP PT.     Patient was intermittently wearing a face mask during this therapy encounter. Therapist used appropriate personal protective equipment including eye protection, mask, and gloves.  Mask used was standard procedure mask. Appropriate PPE was worn during the entire therapy session. Hand hygiene was completed before and after therapy session. Patient is not in enhanced droplet precautions.

## 2022-02-04 NOTE — THERAPY TREATMENT NOTE
Patient Name: Carlito Mo  : 1955    MRN: [id number]                              Today's Date: 2022       Admit Date: 2022    Visit Dx:     ICD-10-CM ICD-9-CM   1. Hypertension not at goal  I10 401.9   2. Other fatigue  R53.83 780.79   3. Noncompliance with medications  Z91.14 V15.81     Patient Active Problem List   Diagnosis   • Major depressive disorder with single episode, in partial remission (HCC)   • Other hyperlipidemia   • Vitamin D deficiency   • Erectile dysfunction   • Chronic fatigue   • Type 2 diabetes mellitus with ophthalmic complication ([facility])   • Skin lesion of chest wall   • Slow transit constipation   • Benign prostatic hyperplasia with nocturia   • History of basal cell carcinoma   • High blood pressure associated with diabetes ([facility])   • Acquired hypothyroidism   • Hypertensive urgency   • Recurrent strokes ([facility])   • Urinary retention   • Pneumonia   • Acute on chronic combined systolic and diastolic congestive heart failure ([facility])   • Acute respiratory failure with hypoxia ([facility])   • Suspected sleep apnea   • Pleural effusion   • YAW (obstructive sleep apnea)   • Coronary artery disease involving native coronary artery of native heart without angina pectoris   • Chronically elevated troponin   • Colon cancer screening   • Enlarged lymph nodes   • Functional gait abnormality   • History of myocardial infarction   • Hospital discharge follow-up   • Insomnia   • Iron deficiency anemia   • Major depression, recurrent (HCC)   • Anemia, chronic renal failure, stage 3 (moderate) ([facility])   • Essential hypertension   • Adverse effect of iron and its compounds, initial encounter   • Acute on chronic renal insufficiency   • Debility   • Falls frequently   • Acute pain of right shoulder   • Acute on chronic anemia   • Heme positive stool   • Neurogenic orthostatic hypotension (HCC)   • Symptomatic anemia   • History of colon polyps   • Helicobacter pylori gastritis   • Esophagitis   • Sleep  related hypoxia   • Embolic stroke (HCC)   • Patent foramen ovale   • Pleural effusion, bilateral   • Cardiomyopathy (HCC)   • Lower abdominal pain   • Diarrhea   • CKD (chronic kidney disease) stage 4, GFR 15-29 ml/min (HCC)   • Hypertension not at goal     Past Medical History:   Diagnosis Date   • Acquired hypothyroidism    • Acute congestive heart failure (HCC)    • Acute respiratory failure with hypoxia (HCC)    • Acute sinusitis    • SYLVAIN (acute kidney injury) (HCC)     on CKD   • Anemia    • Arthritis    • CAD (coronary artery disease)    • Cancer (HCC)     skin   • Chronic combined systolic and diastolic congestive heart failure (HCC)    • Chronic ischemic heart disease    • CKD (chronic kidney disease)    • COPD (chronic obstructive pulmonary disease) (HCC)    • Depression    • Diabetes mellitus type 2, uncontrolled, without complications    • Disease of thyroid gland    • Fatigue    • Frequent PVCs    • Heart attack (HCC)    • History of coronary artery disease     with remote history of bypass surgery in 2001   • History of transfusion    • Hyperglycemia    • Hyperlipidemia    • Hypertension    • Hypertensive encephalopathy    • Mental status change     Acute   • YAW on CPAP    • Pleural effusion    • Pneumonia    • RBBB (right bundle branch block)    • Screening for prostate cancer 2011   • Stroke (HCC)    • TIA (transient ischemic attack)    • Type 2 diabetes mellitus (HCC)    • Urinary retention    • Vitamin D deficiency      Past Surgical History:   Procedure Laterality Date   • APPENDECTOMY N/A 02/14/2016    Dr. Alexey Dodson   • COLONOSCOPY      over 20 years ago.  no polyps    • COLONOSCOPY N/A 9/18/2018    Procedure: COLONOSCOPY;  Surgeon: Jose Enrique Louie MD;  Location: [facility] ENDOSCOPY;  Service: Gastroenterology   • COLONOSCOPY N/A 9/19/2018    IH, EH, polyps (TAs w/low grade dysplasia)   • COLONOSCOPY N/A 6/1/2020    Procedure: COLONOSCOPY;  Surgeon: Jose Enrique Louie MD;  Location:   [facility] ENDOSCOPY;  Service: Gastroenterology;  Laterality: N/A;  Pre op-Anemia, Melena, History of Polyps  Post op-To [place]um/TI, Polyp, Poor Prep   • CORONARY ARTERY BYPASS GRAFT  11/2001   • ENDOSCOPY N/A 5/29/2020    Procedure: ESOPHAGOGASTRODUODENOSCOPY with biopsies;  Surgeon: Amanda Lovelace MD;  Location:  [facility] ENDOSCOPY;  Service: Gastroenterology;  Laterality: N/A;  pre-anemia, dark stools  post-esophagitis, hiatal hernia   • ENDOSCOPY N/A 9/15/2020    Procedure: ESOPHAGOGASTRODUODENOSCOPY WITH BIOPSIES;  Surgeon: Jose Enrique Louie MD;  Location: [facility] ENDOSCOPY;  Service: Gastroenterology;  Laterality: N/A;  pre: history of melena and esophagitis  post: mild esophagitis and gastritis, small hiatal hernia   • HERNIA REPAIR Left 12/05/2019   • TONSILLECTOMY     • VASECTOMY        General Information     Row Name 02/04/22 1035          OT Time and Intention    Document Type therapy note (daily note)  -CW     Mode of Treatment occupational therapy  -CW     Row Name 02/04/22 1035          General Information    Patient Profile Reviewed yes  -CW     Existing Precautions/Restrictions fall  -CW     Row Name 02/04/22 1035          Cognition    Orientation Status (Cognition) oriented x 3  -CW     Row Name 02/04/22 1035          Safety Issues, Functional Mobility    Safety Issues Affecting Function (Mobility) insight into deficits/self-awareness; safety precaution awareness; awareness of need for assistance  -CW           User Key  (r) = Recorded By, (t) = Taken By, (c) = Cosigned By    Initials Name Provider Type    CW Carlotta Motley OTR Occupational Therapist                 Mobility/ADL's     Row Name 02/04/22 1036          Bed Mobility    Supine-Sit [place] (Bed Mobility) supervision  -CW     Sit-Supine [place] (Bed Mobility) supervision  -CW     Assistive Device (Bed Mobility) bed rails; head of bed elevated  -     Row Name 02/04/22 1036          Transfers    Transfers toilet transfer  -CW      [place] Level (Toilet Transfer) contact guard; verbal cues  -     Assistive Device (Toilet Transfer) commode, bedside without drop arms  -     Row Name 02/04/22 1036          Toilet Transfer    Type (Toilet Transfer) stand pivot/stand step  -     Row Name 02/04/22 1036          Activities of Daily Living    BADL Assessment/Intervention grooming; lower body dressing  -     Row Name 02/04/22 1036          Lower Body Dressing Assessment/Training    [place] Level (Lower Body Dressing) doff; don; pants/bottoms; socks; shoes/slippers; minimum assist (75% patient effort); set up  -     Position (Lower Body Dressing) edge of bed sitting  -     Row Name 02/04/22 1036          Grooming Assessment/Training    [place] Level (Grooming) hair care, combing/brushing; oral care regimen; set up; standby assist  -     Position (Grooming) supported sitting  -           User Key  (r) = Recorded By, (t) = Taken By, (c) = Cosigned By    Initials Name Provider Type     Carlotta Motley OTR Occupational Therapist               Obj/Interventions     Row Name 02/04/22 1039          Shoulder (Therapeutic Exercise)    Shoulder (Therapeutic Exercise) AROM (active range of motion)  -     Shoulder AROM (Therapeutic Exercise) bilateral; flexion; extension; horizontal aBduction/aDduction; 10 repetitions; 2 sets; supine  -     Row Name 02/04/22 1039          Elbow/Forearm (Therapeutic Exercise)    Elbow/Forearm (Therapeutic Exercise) AROM (active range of motion)  -     Elbow/Forearm AROM (Therapeutic Exercise) bilateral; flexion; extension; supination; pronation; supine; 10 repetitions; 2 sets  -     Row Name 02/04/22 1039          Wrist (Therapeutic Exercise)    Wrist (Therapeutic Exercise) AROM (active range of motion)  -     Wrist AROM (Therapeutic Exercise) bilateral; flexion; extension; 10 repetitions; 2 sets  -     Row Name 02/04/22 1039          Hand (Therapeutic Exercise)    Hand (Therapeutic  Exercise) AROM (active range of motion)  -CW     Hand AROM/AAROM (Therapeutic Exercise) bilateral; AROM (active range of motion); finger flexion; finger extension; 10 repetitions; 2 sets  -CW     Row Name 02/04/22 1039          Motor Skills    Motor Skills functional endurance  -CW     Functional Endurance fair  -CW     Row Name 02/04/22 1039          Therapeutic Exercise    Therapeutic Exercise shoulder; elbow/forearm; wrist; hand  -CW           User Key  (r) = Recorded By, (t) = Taken By, (c) = Cosigned By    Initials Name Provider Type    CW Carlotta Motley OTR Occupational Therapist               Goals/Plan    No documentation.                Clinical Impression     Row Name 02/04/22 1041          Pain Scale: Numbers Pre/Post-Treatment    Pretreatment Pain Rating 0/10 - no pain  -CW     Posttreatment Pain Rating 0/10 - no pain  -CW     Row Name 02/04/22 1041          Plan of Care Review    Outcome Summary OT-Pt. reports no pain, but has some discomfort B feet at times. BSC transfer completed with CGA and verbal cues for sequence. Oral hygiene/grooming SBA after set up. LE dressing Min A for pants, socks and shoes seated EOB. UE therap. ex. completed to increase functional endurance for self care. Discussed DME at home and safety.Pt. has a wx, and grab bar for tub. Plan to cont. OT and progress as tolerated.  -CW     Row Name 02/04/22 1041          Positioning and Restraints    Pre-Treatment Position in bed  -CW     Post Treatment Position bed  -CW     In Bed encouraged to call for assist; call light within reach; exit alarm on; supine  -CW           User Key  (r) = Recorded By, (t) = Taken By, (c) = Cosigned By    Initials Name Provider Type    CW Carlotta Motley OTR Occupational Therapist               Outcome Measures     Row Name 02/04/22 1045          How much help from another is currently needed...    Putting on and taking off regular lower body clothing? 3  -CW     Bathing (including washing, rinsing, and  drying) 3  -CW     Toileting (which includes using toilet bed pan or urinal) 3  -CW     Putting on and taking off regular upper body clothing 4  -CW     Taking care of personal grooming (such as brushing teeth) 3  -CW     Eating meals 4  -CW     AM-PAC 6 Clicks Score (OT) 20  -CW     Row Name 02/04/22 0857          How much help from another person do you currently need...    Turning from your back to your side while in flat bed without using bedrails? 4  -PM     Moving from lying on back to sitting on the side of a flat bed without bedrails? 4  -PM     Moving to and from a bed to a chair (including a wheelchair)? 4  -PM     Standing up from a chair using your arms (e.g., wheelchair, bedside chair)? 4  -PM     Climbing 3-5 steps with a railing? 2  -PM     To walk in hospital room? 2  -PM     AM-PAC 6 Clicks Score (PT) 20  -PM           User Key  (r) = Recorded By, (t) = Taken By, (c) = Cosigned By    Initials Name Provider Type    Carlotta Camacho OTR Occupational Therapist    PM Tramaine Diane, RN Registered Nurse                Occupational Therapy Education                 Title: PT OT SLP Therapies (Done)     Topic: Occupational Therapy (Done)     Point: ADL training (Done)     Description:   Instruct learner(s) on proper safety adaptation and remediation techniques during self care or transfers.   Instruct in proper use of assistive devices.              Learning Progress Summary           Patient Acceptance, E, VU by CW at 2/4/2022 1046    Acceptance, E, VU by SIMONA at 2/3/2022 1223    Comment: The role of OT    Acceptance, E,TB, VU by SARAH at 2/2/2022 1126                   Point: Home exercise program (Done)     Description:   Instruct learner(s) on appropriate technique for monitoring, assisting and/or progressing therapeutic exercises/activities.              Learning Progress Summary           Patient Acceptance, E, VU by MARLENY at 2/4/2022 1046    Acceptance, E,TB, VU by SARAH at 2/2/2022 1126                    Point: Precautions (Done)     Description:   Instruct learner(s) on prescribed precautions during self-care and functional transfers.              Learning Progress Summary           Patient Acceptance, E, VU by  at 2/4/2022 1046    Acceptance, E,TB, VU by NZ at 2/2/2022 1126                   Point: Body mechanics (Done)     Description:   Instruct learner(s) on proper positioning and spine alignment during self-care, functional mobility activities and/or exercises.              Learning Progress Summary           Patient Acceptance, E, VU by  at 2/4/2022 1046    Acceptance, E,TB, VU by NZ at 2/2/2022 1126                               User Key     Initials Effective Dates Name Provider Type Discipline     06/16/21 -  Carlotta Motley OTR Occupational Therapist OT     01/05/21 -  Pauly Moran OT Occupational Therapist OT    [place] 11/29/21 -  Blanche Truong RN Registered Nurse Nurse              OT Recommendation and Plan     Plan of Care Review  Outcome Summary: OT-Pt. reports no pain, but has some discomfort B feet at times. BSC transfer completed with CGA and verbal cues for sequence. Oral hygiene/grooming SBA after set up. LE dressing Min A for pants, socks and shoes seated EOB. UE therap. ex. completed to increase functional endurance for self care. Discussed DME at home and safety.Pt. has a wx, and grab bar for tub. Plan to cont. OT and progress as tolerated.     Time Calculation:    Time Calculation- OT     Row Name 02/04/22 1047             Time Calculation- OT    OT Start Time 1004  -CW      OT Stop Time 1030  -CW      OT Time Calculation (min) 26 min  -CW      Total Timed Code Minutes- OT 26 minute(s)  -CW      OT - Next Appointment 02/07/22  -CW              Timed Charges    94381 - OT Therapeutic Exercise Minutes 11  -CW      28560 - OT Self Care/Mgmt Minutes 15  -CW              Total Minutes    Timed Charges Total Minutes 26  -CW       Total Minutes 26  -CW            User Key  (r) =  Recorded By, (t) = Taken By, (c) = Cosigned By    Initials Name Provider Type    Carlotta Camacho OTR Occupational Therapist              Therapy Charges for Today     Code Description Service Date Service Provider Modifiers Qty    78266000914 HC OT THER PROC EA 15 MIN 2/4/2022 Carlotta Motley OTR GO 1    [id number] HC OT SELF CARE/MGMT/TRAIN EA 15 MIN 2/4/2022 Carlotta Motley OTR GO 1               FADI Lacey  2/4/2022

## 2022-02-04 NOTE — PROGRESS NOTES
"Daily progress note    Chief complaint  Doing better  No new complaints  Wants to go home    History of present illness  66-year-old white male with very complex past medical history including diabetes hypertension hyperlipidemia chronic kidney disease and patent foramen ovale on anticoagulation admitted through emergency room with fatigue tired generalized weakness.  Patient has increased shortness of breath chest tightness cough for last 3 to 4 days.  Patient denies any fever chills abdominal pain nausea vomiting diarrhea.  Patient ran out of medication for last 1 week.  Patient admitted to the observation unit due to hypertensive urgency and blood pressure stabilized but continued to have symptoms admitted to the monitored bed and I am asked to follow medical problems and take over the care.     REVIEW OF SYSTEMS  Unremarkable     PHYSICAL EXAM  Blood pressure 121/67, pulse 59, temperature 98 °F (36.7 °C), temperature source Oral, resp. rate 16, height 182.9 cm (72\"), weight 81.6 kg (180 lb), SpO2 95 %.    Constitutional:       Appearance: He is ill-appearing.   HENT:      Head: Normocephalic and atraumatic.      Mouth/Throat:      Mouth: Mucous membranes are moist.   Eyes:      Extraocular Movements: Extraocular movements intact.      Pupils: Pupils are equal, round, and reactive to light.   Cardiovascular:      Rate and Rhythm: Regular rhythm. Bradycardia present.   Pulmonary:      Effort: Pulmonary effort is normal.      Breath sounds: Normal breath sounds.   Abdominal:      General: There is no distension.      Palpations: Abdomen is soft.      Tenderness: There is no abdominal tenderness.   Musculoskeletal:      Cervical back: Normal range of motion.   Skin:     General: Skin is warm and dry.      Coloration: Skin is pale.   Neurological:      General: No focal deficit present.      Mental Status: He is alert and oriented to person, place, and time.   Psychiatric:         Mood and Affect: Mood normal.        "  Behavior: Behavior normal.         Thought Content: Thought content normal.         Judgment: Judgment normal.      LABS  Lab Results (last 24 hours)     Procedure Component Value Units Date/Time    POC Glucose Once [[id number]]  (Abnormal) Collected: 02/04/22 1100    Specimen: Blood Updated: 02/04/22 1102     Glucose 281 mg/dL      Comment: Meter: [id number] : [id number] Lai RAMIREZ       POC Glucose Once [[id number]]  (Abnormal) Collected: 02/04/22 0710    Specimen: Blood Updated: 02/04/22 0711     Glucose 137 mg/dL      Comment: Meter: [id number] : [id number] Mickey Valencia RN       Basic Metabolic Panel [[id number]]  (Abnormal) Collected: 02/04/22 0528    Specimen: Blood Updated: 02/04/22 0700     Glucose 67 mg/dL      BUN 63 mg/dL      Creatinine 3.33 mg/dL      Sodium 138 mmol/L      Potassium 4.0 mmol/L      Comment: Slight hemolysis detected by analyzer. Results may be affected.        Chloride 103 mmol/L      CO2 22.8 mmol/L      Calcium 9.2 mg/dL      eGFR Non African Amer 19 mL/min/1.73      BUN/Creatinine Ratio 18.9     Anion Gap 12.2 mmol/L     Narrative:      GFR Normal >60  Chronic Kidney Disease <60  Kidney Failure <15      POC Glucose Once [[id number]]  (Abnormal) Collected: 02/04/22 0639    Specimen: Blood Updated: 02/04/22 0640     Glucose 67 mg/dL      Comment: Meter: [id number] : [id number] Mickey Valencia RN       POC Glucose Once [[id number]]  (Abnormal) Collected: 02/04/22 0627    Specimen: Blood Updated: 02/04/22 0631     Glucose 65 mg/dL      Comment: Meter: [id number] : [id number] Mickey Valencia RN       CBC & Differential [[id number]]  (Abnormal) Collected: 02/04/22 0528    Specimen: Blood Updated: 02/04/22 0556    Narrative:      The following orders were created for panel order CBC & Differential.  Procedure                               Abnormality         Status                     ---------                               -----------         ------                     CBC Auto  Differential[[id number]]        Abnormal            Final result                 Please view results for these tests on the individual orders.    CBC Auto Differential [[id number]]  (Abnormal) Collected: 02/04/22 0528    Specimen: Blood Updated: 02/04/22 0556     WBC 6.76 10*3/mm3      RBC 4.57 10*6/mm3      Hemoglobin 12.0 g/dL      Hematocrit 36.6 %      MCV 80.1 fL      MCH 26.3 pg      MCHC 32.8 g/dL      RDW 14.2 %      RDW-SD 41.2 fl      MPV 10.9 fL      Platelets 227 10*3/mm3      Neutrophil % 63.0 %      Lymphocyte % 23.5 %      Monocyte % 9.9 %      Eosinophil % 3.4 %      Basophil % 0.1 %      Immature Grans % 0.1 %      Neutrophils, Absolute 4.25 10*3/mm3      Lymphocytes, Absolute 1.59 10*3/mm3      Monocytes, Absolute 0.67 10*3/mm3      Eosinophils, Absolute 0.23 10*3/mm3      Basophils, Absolute 0.01 10*3/mm3      Immature Grans, Absolute 0.01 10*3/mm3      nRBC 0.0 /100 WBC     POC Glucose Once [[id number]]  (Abnormal) Collected: 02/03/22 2036    Specimen: Blood Updated: 02/03/22 2037     Glucose 257 mg/dL      Comment: Meter: [id number] : [id number] Mickey Valencia RN       POC Glucose Once [[id number]]  (Abnormal) Collected: 02/03/22 1621    Specimen: Blood Updated: 02/03/22 1625     Glucose 135 mg/dL      Comment: Meter: [id number] : [id number] Juan A RAMIREZ              XR CHEST 1 VW-     Clinical: Fatigue     COMPARISON examination 12/15/2021     FINDINGS: Median sternotomy wires and vascular markers in position.  Cardiac size upper limits of normal. No pleural effusion, vascular  congestion or acute airspace disease is demonstrated.     CONCLUSION: No active disease of the chest     This report was finalized on 2/1/2022 3:27 PM by Dr. Bryan Burnham          ECG 12 Lead  Component   Ref Range & Units 2/2/22 0342 2/1/22 1535 12/15/21 1248 5/20/21 2206   QT Interval   ms 482  491  511  469              HEART RATE= 60  bpm  RR Interval= 1000  ms  OK Interval= 202  ms  P Horizontal Axis= -3  deg  P  Front Axis= 29  deg  QRSD Interval= 155  ms  QT Interval= 482  ms  QRS Axis= -76  deg  T Wave Axis= 80  deg  - ABNORMAL ECG -  Sinus rhythm  Right bundle branch block             Current Facility-Administered Medications:   •  apixaban (ELIQUIS) tablet 2.5 mg, 2.5 mg, Oral, Q12H, Qadah, Abdalhakim, APRN, 2.5 mg at 02/04/22 0855  •  aspirin EC tablet 81 mg, 81 mg, Oral, Daily, Qadah, Abdalhakim, APRN, 81 mg at 02/04/22 0854  •  atorvastatin (LIPITOR) tablet 10 mg, 10 mg, Oral, Nightly, Josue Pickens MD, 10 mg at 02/03/22 2013  •  buPROPion XL (WELLBUTRIN XL) 24 hr tablet 150 mg, 150 mg, Oral, QAM, Qadah, Abdalhakim, APRN, 150 mg at 02/04/22 0615  •  carvedilol (COREG) tablet 3.125 mg, 3.125 mg, Oral, BID With Meals, Josue Pickens MD, 3.125 mg at 02/04/22 0855  •  famotidine (PEPCID) tablet 20 mg, 20 mg, Oral, BID, Josue Pickens MD, 20 mg at 02/04/22 0854  •  insulin glargine (LANTUS, SEMGLEE) injection 10 Units, 10 Units, Subcutaneous, Nightly, Josue Pickens MD, 10 Units at 02/03/22 2042  •  insulin lispro (ADMELOG) injection 0-9 Units, 0-9 Units, Subcutaneous, 4x Daily With Meals & Nightly, Josue Pickens MD, 6 Units at 02/04/22 1207  •  levothyroxine (SYNTHROID, LEVOTHROID) tablet 75 mcg, 75 mcg, Oral, Daily, Qadah, Abdalhakim, APRN, 75 mcg at 02/04/22 0615  •  ondansetron (ZOFRAN) injection 4 mg, 4 mg, Intravenous, Q6H PRN, Qadah, Abdalhakim, APRN  •  sodium chloride 0.9 % flush 10 mL, 10 mL, Intravenous, PRN, Qadah, Abdalhakim, APRN  •  tamsulosin (FLOMAX) 24 hr capsule 0.4 mg, 0.4 mg, Oral, Nightly, Juan Eaton APRN, 0.4 mg at 02/03/22 2013  •  torsemide (DEMADEX) tablet 20 mg, 20 mg, Oral, BID, Josue Pickens MD, 20 mg at 02/04/22 0855    ASSESSMENT  Hypertensive urgency resolved  Elevated troponin   Congestive heart failure  Diabetes mellitus  Hypertension  Hyperlipidemia  Chronic kidney disease  Gastroesophageal reflux disease  Noncompliance with medications    PLAN  Discharge home  Discharge summary  dictated    ORA MCDONALD MD

## 2022-02-05 ENCOUNTER — READMISSION MANAGEMENT (OUTPATIENT)
Dept: CALL CENTER | Facility: HOSPITAL | Age: 67
End: 2022-02-05

## 2022-02-05 NOTE — CASE MANAGEMENT/SOCIAL WORK
Case Management Discharge Note      Final Note: Pt discharged home on 2/4.   PATEL Cody RN         Selected Continued Care - Discharged on 2/4/2022 Admission date: 2/1/2022 - Discharge disposition: Home or Self Care    Destination    No services have been selected for the patient.              Durable Medical Equipment    No services have been selected for the patient.              Dialysis/Infusion    No services have been selected for the patient.              Home Medical Care    No services have been selected for the patient.              Therapy    No services have been selected for the patient.              Community Resources    No services have been selected for the patient.              Community & DME    No services have been selected for the patient.                  Transportation Services  Private: Car    Final Discharge Disposition Code: 01 - home or self-care

## 2022-02-05 NOTE — OUTREACH NOTE
Prep Survey      Responses   [facility] patient discharged from? [place]   Is LACE score < 7 ? No   Emergency Room discharge w/ pulse ox? No   Eligibility Readm Mgmt   Discharge diagnosis Hypertensive urgency resolved, CHF    Does the patient have one of the following disease processes/diagnoses(primary or secondary)? CHF   Does the patient have Home health ordered? No   Is there a DME ordered? No   Prep survey completed? Yes          Katie Vasquez RN

## 2022-02-08 ENCOUNTER — OFFICE VISIT (OUTPATIENT)
Dept: ENDOCRINOLOGY | Age: 67
End: 2022-02-08

## 2022-02-08 ENCOUNTER — READMISSION MANAGEMENT (OUTPATIENT)
Dept: CALL CENTER | Facility: HOSPITAL | Age: 67
End: 2022-02-08

## 2022-02-08 VITALS
WEIGHT: 171 LBS | HEART RATE: 62 BPM | RESPIRATION RATE: 14 BRPM | SYSTOLIC BLOOD PRESSURE: 140 MMHG | DIASTOLIC BLOOD PRESSURE: 82 MMHG | BODY MASS INDEX: 23.16 KG/M2 | HEIGHT: 72 IN

## 2022-02-08 DIAGNOSIS — Z79.4 TYPE 2 DIABETES MELLITUS WITH MODERATE NONPROLIFERATIVE RETINOPATHY WITHOUT MACULAR EDEMA, WITH LONG-TERM CURRENT USE OF INSULIN, UNSPECIFIED LATERALITY: Primary | ICD-10-CM

## 2022-02-08 DIAGNOSIS — E78.49 OTHER HYPERLIPIDEMIA: ICD-10-CM

## 2022-02-08 DIAGNOSIS — E11.3399 TYPE 2 DIABETES MELLITUS WITH MODERATE NONPROLIFERATIVE RETINOPATHY WITHOUT MACULAR EDEMA, WITH LONG-TERM CURRENT USE OF INSULIN, UNSPECIFIED LATERALITY: Primary | ICD-10-CM

## 2022-02-08 DIAGNOSIS — E11.69 ONYCHOMYCOSIS OF MULTIPLE TOENAILS WITH TYPE 2 DIABETES MELLITUS AND PERIPHERAL NEUROPATHY: ICD-10-CM

## 2022-02-08 DIAGNOSIS — R41.3 MEMORY LOSS DUE TO MEDICAL CONDITION: ICD-10-CM

## 2022-02-08 DIAGNOSIS — E11.42 ONYCHOMYCOSIS OF MULTIPLE TOENAILS WITH TYPE 2 DIABETES MELLITUS AND PERIPHERAL NEUROPATHY: ICD-10-CM

## 2022-02-08 DIAGNOSIS — B35.1 ONYCHOMYCOSIS OF MULTIPLE TOENAILS WITH TYPE 2 DIABETES MELLITUS AND PERIPHERAL NEUROPATHY: ICD-10-CM

## 2022-02-08 PROCEDURE — 99203 OFFICE O/P NEW LOW 30 MIN: CPT | Performed by: INTERNAL MEDICINE

## 2022-02-08 RX ORDER — ATORVASTATIN CALCIUM 20 MG/1
20 TABLET, FILM COATED ORAL NIGHTLY
Qty: 30 TABLET | Refills: 3 | Status: SHIPPED | OUTPATIENT
Start: 2022-02-08 | End: 2022-03-10

## 2022-02-08 RX ORDER — PROCHLORPERAZINE 25 MG/1
1 SUPPOSITORY RECTAL ONCE
Qty: 1 EACH | Refills: 0 | Status: SHIPPED | OUTPATIENT
Start: 2022-02-08 | End: 2022-02-08

## 2022-02-08 RX ORDER — INSULIN GLARGINE 100 [IU]/ML
4 INJECTION, SOLUTION SUBCUTANEOUS NIGHTLY
Qty: 15 ML | Refills: 3 | Status: ON HOLD
Start: 2022-02-08 | End: 2023-03-09

## 2022-02-08 RX ORDER — INSULIN GLULISINE 100 [IU]/ML
2 INJECTION, SOLUTION SUBCUTANEOUS
Qty: 15 ML | Refills: 3 | Status: ON HOLD | OUTPATIENT
Start: 2022-02-08 | End: 2023-03-09

## 2022-02-08 RX ORDER — PROCHLORPERAZINE 25 MG/1
1 SUPPOSITORY RECTAL
Qty: 1 EACH | Refills: 4 | Status: SHIPPED | OUTPATIENT
Start: 2022-02-08 | End: 2022-05-18

## 2022-02-08 RX ORDER — PROCHLORPERAZINE 25 MG/1
SUPPOSITORY RECTAL
Qty: 9 EACH | Refills: 4 | Status: SHIPPED | OUTPATIENT
Start: 2022-02-08 | End: 2022-05-18

## 2022-02-08 RX ORDER — LEVOTHYROXINE SODIUM 0.07 MG/1
75 TABLET ORAL DAILY
Qty: 30 TABLET | Refills: 5 | Status: SHIPPED | OUTPATIENT
Start: 2022-02-08

## 2022-02-08 NOTE — OUTREACH NOTE
CHF Week 1 Survey      Responses   [facility] patient discharged from? [place]   Does the patient have one of the following disease processes/diagnoses(primary or secondary)? CHF   CHF Week 1 attempt successful? No   Unsuccessful attempts Attempt 1  [patient is at his endocrinology appt]          Laura Mancuso RN

## 2022-02-08 NOTE — PROGRESS NOTES
Chief Complaint  Hospital Follow Up Visit (went to hospital after fall,  patient states that he was groggy due to low blood pressure)    Subjective          Carlito Mo presents to [facility] ENDOCRINOLOGY  History of Present Illness  12/18/17 last visit at this office with Matilda NORTON.    66 year old male with type 2 diabetes for about 8 years. Hypertension, vitamin D deficiency and chronic kidney disease stage 4.CVA affected memory about 1 year ago 12/2020, He has been told that he has had  7-8 TIA's in last year [2021].  .   By phone his wife joined this visit and indicates patient is too forgetful to check fingersticks and wonders if he is checking glucose or taking medications.   Currently for diabetes taken   He has an autistic son so hard to remember his own doses. His wife is also in the home. She reports chaos in home.  Takes insulin occasionally about every other day the apidra  Takes Basaglar once per week in the morning.  Other medications: he is not sure .  Diabetes with eye complications listed and patient does not recall , He sees providers at [facility] in [facility]. Last visit last summer 2021.   Diabetes monitoring: none.     Hypothyroid: scheduled to take levothyroxine 75 mcg daily. Patient cannot confirm. Last TSH 1.45 , at goal on 2/3/22 indicating he is taking this medication.    Hypercholesterolemia: He is less likely to be taking his statin drug  Or the dose of atorvastatin 10 mg is insufficient as LDL-C 193 is severely elevated. Patient cannot confirm or deny taking medication.     Past Medical History:   Diagnosis Date   • Acquired hypothyroidism    • Acute congestive heart failure (HCC)    • Acute respiratory failure with hypoxia (HCC)    • Acute sinusitis    • SYLVAIN (acute kidney injury) (HCC)     on CKD   • Anemia    • Arthritis    • CAD (coronary artery disease)    • Cancer (HCC)     skin   • Chronic combined systolic and diastolic  "congestive heart failure (HCC)    • Chronic ischemic heart disease    • CKD (chronic kidney disease)    • COPD (chronic obstructive pulmonary disease) (HCC)    • Depression    • Diabetes mellitus type 2, uncontrolled, without complications    • Disease of thyroid gland    • Fatigue    • Frequent PVCs    • Heart attack (HCC)    • History of coronary artery disease     with remote history of bypass surgery in 2001   • History of transfusion    • Hyperglycemia    • Hyperlipidemia    • Hypertension    • Hypertensive encephalopathy    • Mental status change     Acute   • YAW on CPAP    • Pleural effusion    • Pneumonia    • RBBB (right bundle branch block)    • Screening for prostate cancer 2011   • Stroke (HCC)    • TIA (transient ischemic attack)    • Type 2 diabetes mellitus (HCC)    • Urinary retention    • Vitamin D deficiency      Objective   Vital Signs:   /82 (BP Location: Left arm, Patient Position: Sitting, Cuff Size: Adult)   Pulse 62   Resp 14   Ht 182.9 cm (72\")   Wt 77.6 kg (171 lb)   BMI 23.19 kg/m²     Physical Exam  Vitals and nursing note reviewed.   HENT:      Head: Normocephalic.      Mouth/Throat:      Mouth: Mucous membranes are moist.   Cardiovascular:      Rate and Rhythm: Normal rate.      Pulses: Normal pulses.      Heart sounds: Normal heart sounds.   Pulmonary:      Effort: Pulmonary effort is normal.      Breath sounds: Normal breath sounds. No wheezing or rhonchi.   Abdominal:      General: Bowel sounds are normal.      Palpations: Abdomen is soft.   Musculoskeletal:         General: No swelling. Normal range of motion.      Cervical back: Normal range of motion and neck supple.   Feet:      Right foot:      Skin integrity: Dry skin present.      Toenail Condition: Right toenails are abnormally thick.      Left foot:      Skin integrity: Dry skin present.      Toenail Condition: Left toenails are abnormally thick.   Skin:     General: Skin is warm and dry.   Neurological:      " Mental Status: He is alert and oriented to person, place, and time. Mental status is at baseline.   Psychiatric:         Mood and Affect: Mood normal.         Behavior: Behavior normal.         Judgment: Judgment normal.      Comments: Patients focus is poor at visit. Very pleasant. Unable to confirm medications, doses.         Result Review   The following data was reviewed by: Erica Welsh MD on 02/08/2022:  Component   Ref Range & Units 5 d ago   (2/3/22) 1 mo ago   (12/15/21) 8 mo ago   (5/21/21) 1 yr ago   (5/26/20) 1 yr ago   (4/17/20) 2 yr ago   (1/29/20) 2 yr ago   (11/20/19)   Hemoglobin A1C   4.80 - 5.60 % 9.38 High   7.68 High   8.30 High   9.60 High   8.3 High  R, CM  8.6 High  R, CM  8.5 High  R, CM      Component      Latest Ref Rng & Units 2/3/2022   Total Cholesterol      0 - 200 mg/dL 256 (H)   Triglycerides      0 - 150 mg/dL 87   HDL Cholesterol      40 - 60 mg/dL 48   LDL Cholesterol       0 - 100 mg/dL 193 (H)   VLDL Cholesterol      5 - 40 mg/dL 15   LDL/HDL Ratio       3.97   TSH Baseline      0.270 - 4.200 uIU/mL 1.450   Hemoglobin A1C      4.80 - 5.60 % 9.38 (H)            Assessment and Plan    67-year-old  male with type 2 diabetes uncontrolled hemoglobin A1c 9.38%.referred for type 2 diabetes management  Comorbidities include BMI 23, hypothyroidism recurrent strokes, YAW, hyperlipidemia, hypertension, neurogenic orthostatic hypotension, chronic kidney disease stage IV, CAD.  Most recent hospitalization on 2/1/2022 for hypertension.  Patient recalls insulin was not needed/given.  1.  Noncompliance with injecting insulin in part due to memory loss.  Spoke with his wife over the phone.  She will work with him to try to be more consistent with taking medications and insulin.  Patient has agreed to allow his wife to assist him.  2.  Monitoring of glucose: Encourage that he start using a continuous glucose monitor.  Prior authorization started and Dexcom ordered.    3.   Medications for it diabetes-doses of insulin over time on review of record is markedly decreased likely due to severe progression of chronic kidney disease.   A.  Glargine 4 units at bedtime   B.  Apidra 2 units before each meal.   C. Increase lipitor [atorvastatin from 10 mg to 20 mg q hs .   D. I have asked his wife to consider taking over giving him his medications and insulin .     4. Hypothyroid stable. Continue current levothyroxine.     Diagnoses and all orders for this visit:    1. 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 Dispense 1 Dexcom G6  (NDC #[address]-04). Check 6 times a day. Dx: E 1  Dispense: 1 each; Refill: 0  -     Continuous Blood Gluc Sensor (Dexcom G6 Sensor); Dipsense Dexcom G6 Sensors, to replace every 10 days, 3 sensors per 30 day period (NDC #[address]-96). Check 6 times a day. Dx: E 11.65  Dispense: 9 each; Refill: 4  -     Continuous Blood Gluc Transmit (Dexcom G6 Transmitter) misc; 1 each Every 3 (Three) Months. Dispense 1 Dexcom G6 Transmitter for each 3 month period (NDC #[address]-78). Check 6 times a day. Dx: E 11.65  Dispense: 1 each; Refill: 4  -     Ambulatory Referral to Podiatry    2. Memory loss due to medical condition  Assessment & Plan:  Multiple cerebral vascular events. Patient recalls 6-7 TIA's in last year and increased forgetfulness. He cannot confirm or deny current medications and takes insulin about 1-2 x per week.   Forgets to take insulin as memory is decreasing.   Plan  Spoke with his with wife to take over medications and insulin.   Foot care: also difficult and forgotten. Recommend podiatry appointment for thick long toe nail care.     Orders:  -     Ambulatory Referral to Podiatry    3. Onychomycosis of multiple toenails  with type 2 diabetes mellitus and peripheral neuropathy (HCC)  -     Ambulatory Referral to Podiatry    4. Other hyperlipidemia  -     atorvastatin (LIPITOR) 20 MG tablet; Take 1 tablet by mouth Every Night for 30 days.  Dispense: 30 tablet; Refill: 3    Other orders  -     Insulin Glargine (BASAGLAR KWIKPEN) 100 UNIT/ML injection pen; Inject 4 Units under the skin into the appropriate area as directed Every Night. Patient says he does not take consistently  Dispense: 15 mL; Refill: 3  -     insulin glulisine (Apidra) 100 UNIT/ML injection; Inject 2 Units under the skin into the appropriate area as directed 3 (Three) Times a Day Before Meals. Patient says he does not take consistently  Dispense: 15 mL; Refill: 3  -     levothyroxine (SYNTHROID, LEVOTHROID) 75 MCG tablet; Take 1 tablet by mouth Daily.  Dispense: 30 tablet; Refill: 5  Return in 4 weeks with CGM and plan of spouse caring for dosing insulin and medications to determine next step.    I spent 40 minutes caring for Carlito on this date of service. This time includes time spent by me in the following activities:reviewing tests, obtaining and/or reviewing a separately obtained history, performing a medically appropriate examination and/or evaluation , counseling and educating the patient/family/caregiver and ordering medications, tests, or procedures  Follow Up   Return in about 4 weeks (around 3/8/2022).  Patient was given instructions and counseling regarding his condition or for health maintenance advice. Please see specific information pulled into the AVS if appropriate.

## 2022-02-09 ENCOUNTER — APPOINTMENT (OUTPATIENT)
Dept: LAB | Facility: HOSPITAL | Age: 67
End: 2022-02-09

## 2022-02-10 ENCOUNTER — READMISSION MANAGEMENT (OUTPATIENT)
Dept: CALL CENTER | Facility: HOSPITAL | Age: 67
End: 2022-02-10

## 2022-02-10 NOTE — ASSESSMENT & PLAN NOTE
Multiple cerebral vascular events. Patient recalls 6-7 TIA's in last year and increased forgetfulness. He cannot confirm or deny current medications and takes insulin about 1-2 x per week.   Forgets to take insulin as memory is decreasing.   Plan  Spoke with his with wife to take over medications and insulin.   Foot care: also difficult and forgotten. Recommend podiatry appointment for thick long toe nail care.

## 2022-02-10 NOTE — OUTREACH NOTE
CHF Week 1 Survey      Responses   [facility] patient discharged from? [place]   Does the patient have one of the following disease processes/diagnoses(primary or secondary)? CHF   CHF Week 1 attempt successful? No   Unsuccessful attempts Attempt 2          Pam Garza RN

## 2022-02-14 ENCOUNTER — TELEPHONE (OUTPATIENT)
Dept: ENDOCRINOLOGY | Age: 67
End: 2022-02-14

## 2022-02-14 ENCOUNTER — READMISSION MANAGEMENT (OUTPATIENT)
Dept: CALL CENTER | Facility: HOSPITAL | Age: 67
End: 2022-02-14

## 2022-02-14 NOTE — OUTREACH NOTE
CHF Week 1 Survey      Responses   [facility] patient discharged from? [place]   Does the patient have one of the following disease processes/diagnoses(primary or secondary)? CHF   CHF Week 1 attempt successful? Yes   Call start time 1246   Call end time 1258   Discharge diagnosis Hypertensive urgency resolved, CHF    Is patient permission given to speak with other caregiver? Yes   List who call center can speak with Wife- Jenifer Simmons reviewed with patient/caregiver? Yes   Is the patient having any side effects they believe may be caused by any medication additions or changes? No   Does the patient have all medications ordered at discharge? Yes   Is the patient taking all medications as directed (includes completed medication regime)? Yes   Medication comments STates his pharmacist states he has not gotten permission to refill Eliquis from prescriber   Does the patient have a primary care provider?  Yes   Does the patient have an appointment with their PCP within 7 days of discharge? No   What is preventing the patient from scheduling follow up appointments within 7 days of discharge? Haven't had time   Has the patient kept scheduled appointments due by today? Yes   Comments Has seen Endocrinologist.    Has home health visited the patient within 72 hours of discharge? N/A   Pulse Ox monitoring None   Psychosocial issues? No   Did the patient receive a copy of their discharge instructions? Yes   Nursing interventions Reviewed instructions with patient   What is the patient's perception of their health status since discharge? Same   Nursing interventions Nurse provided patient education   Is the patient weighing daily? Yes   Does the patient have scales? Yes   Daily weight interventions Education provided on importance of daily weight   Is the patient able to teach back Heart Failure diet management? Yes   Is the patient able to teach back Heart Failure Zones? No  [reviewed CHF zones with pt. States he is  between green and yellow.]   Is the patient able to teach back signs and symptoms of worsening condition? (i.e. weight gain, shortness of air, etc.) Yes   If the patient is a current smoker, are they able to teach back resources for cessation? Not a smoker   Is the patient/caregiver able to teach back the hierarchy of who to call/visit for symptoms/problems? PCP, Specialist, Home health nurse, Urgent Care, ED, 911 Yes   Additional teach back comments States he is fatigued but states he is having trouble    CHF Week 1 call completed? Yes   Wrap up additional comments Pt states he is feeling fatigued but attributes this to lack of sleep due to their son waking them early in the morning. Has some new medications he is waiting on PA or physician to contact pharmacy to reorder.           Rhonda Weiner RN

## 2022-02-23 ENCOUNTER — READMISSION MANAGEMENT (OUTPATIENT)
Dept: CALL CENTER | Facility: HOSPITAL | Age: 67
End: 2022-02-23

## 2022-02-23 NOTE — OUTREACH NOTE
CHF Week 2 Survey      Responses   [facility] patient discharged from? [place]   Does the patient have one of the following disease processes/diagnoses(primary or secondary)? CHF   Week 2 attempt successful? No   Unsuccessful attempts Attempt 1          Jenae Sidhu RN

## 2022-02-28 ENCOUNTER — READMISSION MANAGEMENT (OUTPATIENT)
Dept: CALL CENTER | Facility: HOSPITAL | Age: 67
End: 2022-02-28

## 2022-02-28 NOTE — OUTREACH NOTE
CHF Week 2 Survey      Responses   [facility] patient discharged from? [place]   Does the patient have one of the following disease processes/diagnoses(primary or secondary)? CHF   Week 2 attempt successful? No   Unsuccessful attempts Attempt 2          Rachel Hooker RN

## 2022-03-01 ENCOUNTER — APPOINTMENT (OUTPATIENT)
Dept: CT IMAGING | Facility: HOSPITAL | Age: 67
End: 2022-03-01

## 2022-03-01 ENCOUNTER — HOSPITAL ENCOUNTER (INPATIENT)
Facility: HOSPITAL | Age: 67
LOS: 7 days | Discharge: NURSING, REHAB OR LONG-TERM CARE | End: 2022-03-08
Attending: EMERGENCY MEDICINE | Admitting: HOSPITALIST

## 2022-03-01 ENCOUNTER — READMISSION MANAGEMENT (OUTPATIENT)
Dept: CALL CENTER | Facility: HOSPITAL | Age: 67
End: 2022-03-01

## 2022-03-01 DIAGNOSIS — N18.9 ACUTE ON CHRONIC RENAL INSUFFICIENCY: ICD-10-CM

## 2022-03-01 DIAGNOSIS — N28.9 ACUTE ON CHRONIC RENAL INSUFFICIENCY: ICD-10-CM
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
## 2022-03-01 DIAGNOSIS — R77.8 ELEVATED TROPONIN: ICD-10-CM

## 2022-03-01 DIAGNOSIS — R53.1 GENERALIZED WEAKNESS: Primary | ICD-10-CM

## 2022-03-01 LAB
ALBUMIN SERPL-MCNC: 3.1 G/DL (ref 3.5–5.2)
ALBUMIN/GLOB SERPL: 1 G/DL
ALP SERPL-CCNC: 97 U/L (ref 39–117)
ALT SERPL W P-5'-P-CCNC: 60 U/L (ref 1–41)
ANION GAP SERPL CALCULATED.3IONS-SCNC: 14.3 MMOL/L (ref 5–15)
APTT PPP: 21.9 SECONDS (ref 22.7–35.4)
AST SERPL-CCNC: 33 U/L (ref 1–40)
BACTERIA UR QL AUTO: NORMAL /HPF
BASOPHILS # BLD AUTO: 0.02 10*3/MM3 (ref 0–0.2)
BASOPHILS NFR BLD AUTO: 0.2 % (ref 0–1.5)
BILIRUB SERPL-MCNC: 0.2 MG/DL (ref 0–1.2)
BILIRUB UR QL STRIP: NEGATIVE
BUN SERPL-MCNC: 68 MG/DL (ref 8–23)
BUN/CREAT SERPL: 15.8 (ref 7–25)
CALCIUM SPEC-SCNC: 8.8 MG/DL (ref 8.6–10.5)
CHLORIDE SERPL-SCNC: 105 MMOL/L (ref 98–107)
CHLORIDE UR-SCNC: 88 MMOL/L
CLARITY UR: CLEAR
CO2 SERPL-SCNC: 18.7 MMOL/L (ref 22–29)
COLOR UR: YELLOW
CREAT SERPL-MCNC: 4.31 MG/DL (ref 0.76–1.27)
CREAT UR-MCNC: 49.7 MG/DL
DEPRECATED RDW RBC AUTO: 45.2 FL (ref 37–54)
EGFRCR SERPLBLD CKD-EPI 2021: 14.4 ML/MIN/1.73
EOSINOPHIL # BLD AUTO: 0.04 10*3/MM3 (ref 0–0.4)
EOSINOPHIL NFR BLD AUTO: 0.5 % (ref 0.3–6.2)
ERYTHROCYTE [DISTWIDTH] IN BLOOD BY AUTOMATED COUNT: 14.6 % (ref 12.3–15.4)
GLOBULIN UR ELPH-MCNC: 3.1 GM/DL
GLUCOSE BLDC GLUCOMTR-MCNC: 229 MG/DL (ref 70–130)
GLUCOSE BLDC GLUCOMTR-MCNC: 275 MG/DL (ref 70–130)
GLUCOSE SERPL-MCNC: 242 MG/DL (ref 65–99)
GLUCOSE UR STRIP-MCNC: ABNORMAL MG/DL
HCT VFR BLD AUTO: 37.2 % (ref 37.5–51)
HGB BLD-MCNC: 11.9 G/DL (ref 13–17.7)
HGB UR QL STRIP.AUTO: NEGATIVE
HYALINE CASTS UR QL AUTO: NORMAL /LPF
INR PPP: 1.07 (ref 0.9–1.1)
KETONES UR QL STRIP: NEGATIVE
LEUKOCYTE ESTERASE UR QL STRIP.AUTO: NEGATIVE
LYMPHOCYTES # BLD AUTO: 1.11 10*3/MM3 (ref 0.7–3.1)
LYMPHOCYTES NFR BLD AUTO: 13.4 % (ref 19.6–45.3)
MCH RBC QN AUTO: 27.1 PG (ref 26.6–33)
MCHC RBC AUTO-ENTMCNC: 32 G/DL (ref 31.5–35.7)
MCV RBC AUTO: 84.7 FL (ref 79–97)
MONOCYTES # BLD AUTO: 0.49 10*3/MM3 (ref 0.1–0.9)
MONOCYTES NFR BLD AUTO: 5.9 % (ref 5–12)
NEUTROPHILS NFR BLD AUTO: 6.59 10*3/MM3 (ref 1.7–7)
NEUTROPHILS NFR BLD AUTO: 79.4 % (ref 42.7–76)
NITRITE UR QL STRIP: NEGATIVE
PH UR STRIP.AUTO: <=5 [PH] (ref 5–8)
PLATELET # BLD AUTO: 240 10*3/MM3 (ref 140–450)
PMV BLD AUTO: 11 FL (ref 6–12)
POTASSIUM SERPL-SCNC: 4.1 MMOL/L (ref 3.5–5.2)
PROT ?TM UR-MCNC: 120.6 MG/DL
PROT SERPL-MCNC: 6.2 G/DL (ref 6–8.5)
PROT UR QL STRIP: ABNORMAL
PROT/CREAT UR: 2426.6 MG/G CREA (ref 0–200)
PROTHROMBIN TIME: 13.8 SECONDS (ref 11.7–14.2)
QT INTERVAL: 482 MS
RBC # BLD AUTO: 4.39 10*6/MM3 (ref 4.14–5.8)
RBC # UR STRIP: NORMAL /HPF
REF LAB TEST METHOD: NORMAL
SARS-COV-2 RNA PNL SPEC NAA+PROBE: NOT DETECTED
SODIUM SERPL-SCNC: 138 MMOL/L (ref 136–145)
SODIUM UR-SCNC: 97 MMOL/L
SP GR UR STRIP: 1.01 (ref 1–1.03)
SQUAMOUS #/AREA URNS HPF: NORMAL /HPF
TROPONIN T SERPL-MCNC: 0.13 NG/ML (ref 0–0.03)
UROBILINOGEN UR QL STRIP: ABNORMAL
WBC # UR STRIP: NORMAL /HPF
WBC NRBC COR # BLD: 8.3 10*3/MM3 (ref 3.4–10.8)

## 2022-03-01 PROCEDURE — 90791 PSYCH DIAGNOSTIC EVALUATION: CPT

## 2022-03-01 PROCEDURE — 82962 GLUCOSE BLOOD TEST: CPT

## 2022-03-01 PROCEDURE — 84484 ASSAY OF TROPONIN QUANT: CPT | Performed by: EMERGENCY MEDICINE

## 2022-03-01 PROCEDURE — 82570 ASSAY OF URINE CREATININE: CPT | Performed by: INTERNAL MEDICINE

## 2022-03-01 PROCEDURE — 84300 ASSAY OF URINE SODIUM: CPT | Performed by: INTERNAL MEDICINE

## 2022-03-01 PROCEDURE — 80053 COMPREHEN METABOLIC PANEL: CPT | Performed by: EMERGENCY MEDICINE

## 2022-03-01 PROCEDURE — 85730 THROMBOPLASTIN TIME PARTIAL: CPT | Performed by: EMERGENCY MEDICINE

## 2022-03-01 PROCEDURE — 84156 ASSAY OF PROTEIN URINE: CPT | Performed by: INTERNAL MEDICINE

## 2022-03-01 PROCEDURE — 82436 ASSAY OF URINE CHLORIDE: CPT | Performed by: INTERNAL MEDICINE

## 2022-03-01 PROCEDURE — 63710000001 INSULIN LISPRO (HUMAN) PER 5 UNITS: Performed by: HOSPITALIST

## 2022-03-01 PROCEDURE — 99285 EMERGENCY DEPT VISIT HI MDM: CPT

## 2022-03-01 PROCEDURE — 93010 ELECTROCARDIOGRAM REPORT: CPT | Performed by: INTERNAL MEDICINE

## 2022-03-01 PROCEDURE — 99221 1ST HOSP IP/OBS SF/LOW 40: CPT | Performed by: NURSE PRACTITIONER

## 2022-03-01 PROCEDURE — 85610 PROTHROMBIN TIME: CPT | Performed by: EMERGENCY MEDICINE

## 2022-03-01 PROCEDURE — 85007 BL SMEAR W/DIFF WBC COUNT: CPT | Performed by: EMERGENCY MEDICINE

## 2022-03-01 PROCEDURE — 70450 CT HEAD/BRAIN W/O DYE: CPT

## 2022-03-01 PROCEDURE — 85025 COMPLETE CBC W/AUTO DIFF WBC: CPT | Performed by: EMERGENCY MEDICINE

## 2022-03-01 PROCEDURE — 81001 URINALYSIS AUTO W/SCOPE: CPT | Performed by: EMERGENCY MEDICINE

## 2022-03-01 PROCEDURE — 93005 ELECTROCARDIOGRAM TRACING: CPT | Performed by: EMERGENCY MEDICINE

## 2022-03-01 PROCEDURE — 87635 SARS-COV-2 COVID-19 AMP PRB: CPT | Performed by: EMERGENCY MEDICINE

## 2022-03-01 RX ORDER — SODIUM CHLORIDE 9 MG/ML
75 INJECTION, SOLUTION INTRAVENOUS CONTINUOUS
Status: DISCONTINUED | OUTPATIENT
Start: 2022-03-01 | End: 2022-03-03

## 2022-03-01 RX ORDER — TAMSULOSIN HYDROCHLORIDE 0.4 MG/1
0.4 CAPSULE ORAL NIGHTLY
Status: DISCONTINUED | OUTPATIENT
Start: 2022-03-01 | End: 2022-03-08 | Stop reason: HOSPADM

## 2022-03-01 RX ORDER — SODIUM CHLORIDE 9 MG/ML
75 INJECTION, SOLUTION INTRAVENOUS CONTINUOUS
Status: DISCONTINUED | OUTPATIENT
Start: 2022-03-01 | End: 2022-03-01

## 2022-03-01 RX ORDER — INSULIN LISPRO 100 [IU]/ML
0-7 INJECTION, SOLUTION INTRAVENOUS; SUBCUTANEOUS
Status: DISCONTINUED | OUTPATIENT
Start: 2022-03-01 | End: 2022-03-01

## 2022-03-01 RX ORDER — CARVEDILOL 3.12 MG/1
3.12 TABLET ORAL 2 TIMES DAILY WITH MEALS
Status: DISCONTINUED | OUTPATIENT
Start: 2022-03-01 | End: 2022-03-08 | Stop reason: HOSPADM

## 2022-03-01 RX ORDER — SODIUM CHLORIDE 0.9 % (FLUSH) 0.9 %
10 SYRINGE (ML) INJECTION AS NEEDED
Status: DISCONTINUED | OUTPATIENT
Start: 2022-03-01 | End: 2022-03-08 | Stop reason: HOSPADM

## 2022-03-01 RX ORDER — ATORVASTATIN CALCIUM 20 MG/1
20 TABLET, FILM COATED ORAL NIGHTLY
Status: DISCONTINUED | OUTPATIENT
Start: 2022-03-01 | End: 2022-03-02

## 2022-03-01 RX ORDER — FAMOTIDINE 20 MG/1
20 TABLET, FILM COATED ORAL 2 TIMES DAILY
Status: DISCONTINUED | OUTPATIENT
Start: 2022-03-01 | End: 2022-03-04

## 2022-03-01 RX ORDER — INSULIN GLARGINE 100 [IU]/ML
4 INJECTION, SOLUTION SUBCUTANEOUS NIGHTLY
Status: DISCONTINUED | OUTPATIENT
Start: 2022-03-01 | End: 2022-03-01 | Stop reason: CLARIF

## 2022-03-01 RX ORDER — LEVOTHYROXINE SODIUM 0.07 MG/1
75 TABLET ORAL DAILY
Status: DISCONTINUED | OUTPATIENT
Start: 2022-03-01 | End: 2022-03-08 | Stop reason: HOSPADM

## 2022-03-01 RX ORDER — INSULIN LISPRO 100 [IU]/ML
0-7 INJECTION, SOLUTION INTRAVENOUS; SUBCUTANEOUS
Status: DISCONTINUED | OUTPATIENT
Start: 2022-03-01 | End: 2022-03-08 | Stop reason: HOSPADM

## 2022-03-01 RX ORDER — INSULIN LISPRO 100 [IU]/ML
3 INJECTION, SOLUTION INTRAVENOUS; SUBCUTANEOUS
Status: DISCONTINUED | OUTPATIENT
Start: 2022-03-01 | End: 2022-03-03

## 2022-03-01 RX ORDER — INSULIN LISPRO 100 [IU]/ML
2 INJECTION, SOLUTION INTRAVENOUS; SUBCUTANEOUS
Status: DISCONTINUED | OUTPATIENT
Start: 2022-03-01 | End: 2022-03-01

## 2022-03-01 RX ORDER — ASPIRIN 81 MG/1
81 TABLET ORAL DAILY
Status: DISCONTINUED | OUTPATIENT
Start: 2022-03-01 | End: 2022-03-08 | Stop reason: HOSPADM

## 2022-03-01 RX ORDER — MELATONIN
5000 DAILY
Status: DISCONTINUED | OUTPATIENT
Start: 2022-03-01 | End: 2022-03-02

## 2022-03-01 RX ORDER — BUPROPION HYDROCHLORIDE 150 MG/1
150 TABLET ORAL EVERY MORNING
Status: DISCONTINUED | OUTPATIENT
Start: 2022-03-01 | End: 2022-03-03

## 2022-03-01 RX ADMIN — ASPIRIN 81 MG: 81 TABLET, COATED ORAL at 12:57

## 2022-03-01 RX ADMIN — FAMOTIDINE 20 MG: 20 TABLET ORAL at 12:56

## 2022-03-01 RX ADMIN — INSULIN LISPRO 4 UNITS: 100 INJECTION, SOLUTION INTRAVENOUS; SUBCUTANEOUS at 20:59

## 2022-03-01 RX ADMIN — APIXABAN 2.5 MG: 2.5 TABLET, FILM COATED ORAL at 12:57

## 2022-03-01 RX ADMIN — SODIUM CHLORIDE 1000 ML: 9 INJECTION, SOLUTION INTRAVENOUS at 04:01

## 2022-03-01 RX ADMIN — BUPROPION HYDROCHLORIDE 150 MG: 150 TABLET, EXTENDED RELEASE ORAL at 12:57

## 2022-03-01 RX ADMIN — FAMOTIDINE 20 MG: 20 TABLET ORAL at 20:59

## 2022-03-01 RX ADMIN — INSULIN LISPRO 3 UNITS: 100 INJECTION, SOLUTION INTRAVENOUS; SUBCUTANEOUS at 18:24

## 2022-03-01 RX ADMIN — ATORVASTATIN CALCIUM 20 MG: 20 TABLET, FILM COATED ORAL at 20:59

## 2022-03-01 RX ADMIN — LEVOTHYROXINE SODIUM 75 MCG: 0.07 TABLET ORAL at 12:56

## 2022-03-01 RX ADMIN — CARVEDILOL 3.12 MG: 3.12 TABLET, FILM COATED ORAL at 18:24

## 2022-03-01 RX ADMIN — APIXABAN 2.5 MG: 2.5 TABLET, FILM COATED ORAL at 20:59

## 2022-03-01 RX ADMIN — Medication 5000 UNITS: at 12:57

## 2022-03-01 RX ADMIN — SODIUM CHLORIDE 100 ML/HR: 9 INJECTION, SOLUTION INTRAVENOUS at 10:58

## 2022-03-01 RX ADMIN — TAMSULOSIN HYDROCHLORIDE 0.4 MG: 0.4 CAPSULE ORAL at 20:59

## 2022-03-01 RX ADMIN — INSULIN GLARGINE-YFGN 10 UNITS: 100 INJECTION, SOLUTION SUBCUTANEOUS at 20:59

## 2022-03-01 NOTE — CONSULTS
[facility] Consult Note      Patient Name: Carlito Mo  : 1955  MRN: [id number]  Primary Care Physician:  Florencia Caballero MD  Referring Physician: Marcos Barcenas MD  Date of admission: 3/1/2022    Subjective     Reason for Consult: Acute kidney injury on chronic kidney disease stage IV    HPI:   Carlito oM is a 66 y.o. male was admitted today with the generalized weakness and he had multiple fall at home was found to have worsening renal function hence nephrology consult was requested.  The patient was seen in our office on 2022 at that time he was having significant edema he was diuresed his edema improved significantly and he has been complaining of weakness he has not been weighing himself as he was instructed to do.  And he continued to take the same dose of diuretics.  The patient has a history of diabetes mellitus type 2 with diabetic nephropathy, he has hypertension, prior history of CVA, coronary artery disease prior bypass graft surgery, also history of atrial fibrillation, he has history of BPH, was hospitalized in 2021 at that time he had large pleural effusion required thoracentesis and had congestive heart failure his creatinine was up to 4 at that time and then gradually improved down in the 3 range and today his creatinine is up to 4.3    At present complaining of being very weak, had multiple falls at home, denies any chest pain or shortness of air, no orthopnea or PND, no nausea or vomiting, no constipation diarrhea, no melena, hematochezia or hematemesis, no dysuria or gross hematuria.  He is lightheaded.      Review of Systems:   14 point review of systems is otherwise negative except for mentioned above on HPI    Personal History     Past Medical History:   Diagnosis Date   • Acquired hypothyroidism    • Acute congestive heart failure (HCC)    • Acute respiratory failure with hypoxia (HCC)    • Acute sinusitis    • SYLVAIN (acute kidney injury)  (HCC)     on CKD   • Anemia    • Arthritis    • CAD (coronary artery disease)    • Cancer (HCC)     skin   • Chronic combined systolic and diastolic congestive heart failure (HCC)    • Chronic ischemic heart disease    • CKD (chronic kidney disease)    • COPD (chronic obstructive pulmonary disease) (HCC)    • Depression    • Diabetes mellitus type 2, uncontrolled, without complications    • Disease of thyroid gland    • Elevated cholesterol    • Fatigue    • Frequent PVCs    • GERD (gastroesophageal reflux disease)    • Heart attack (HCC)    • History of coronary artery disease     with remote history of bypass surgery in 2001   • History of transfusion    • Hyperglycemia    • Hyperlipidemia    • Hypertension    • Hypertensive encephalopathy    • Mental status change     Acute   • YAW on CPAP    • Pleural effusion    • Pneumonia    • RBBB (right bundle branch block)    • Screening for prostate cancer 2011   • Stroke (HCC)    • TIA (transient ischemic attack)    • Type 2 diabetes mellitus (HCC)    • Urinary retention    • Vitamin D deficiency        Past Surgical History:   Procedure Laterality Date   • APPENDECTOMY N/A 02/14/2016    Dr. Alexey Dodson   • COLONOSCOPY      over 20 years ago.  no polyps    • COLONOSCOPY N/A 9/18/2018    Procedure: COLONOSCOPY;  Surgeon: Jose Enrique Louie MD;  Location: [facility] ENDOSCOPY;  Service: Gastroenterology   • COLONOSCOPY N/A 9/19/2018    IH, EH, polyps (TAs w/low grade dysplasia)   • COLONOSCOPY N/A 6/1/2020    Procedure: COLONOSCOPY;  Surgeon: Jose Enrique Louie MD;  Location: [facility] ENDOSCOPY;  Service: Gastroenterology;  Laterality: N/A;  Pre op-Anemia, Melena, History of Polyps  Post op-To Cecum/TI, Polyp, Poor Prep   • CORONARY ARTERY BYPASS GRAFT  11/2001   • ENDOSCOPY N/A 5/29/2020    Procedure: ESOPHAGOGASTRODUODENOSCOPY with biopsies;  Surgeon: Amanda Lovelace MD;  Location: [facility] ENDOSCOPY;  Service: Gastroenterology;  Laterality: N/A;  pre-anemia, dark  stools  post-esophagitis, hiatal hernia   • ENDOSCOPY N/A 9/15/2020    Procedure: ESOPHAGOGASTRODUODENOSCOPY WITH BIOPSIES;  Surgeon: Jose Enrique Louie MD;  Location: [facility] ENDOSCOPY;  Service: Gastroenterology;  Laterality: N/A;  pre: history of melena and esophagitis  post: mild esophagitis and gastritis, small hiatal hernia   • HERNIA REPAIR Left 12/05/2019   • TONSILLECTOMY     • VASECTOMY         Family History: family history includes Alcohol abuse in his brother and father; Autism in his son; Cancer in his father; Cirrhosis in his brother; Diabetes in his mother and son; Heart disease in his father; Hypertension in his mother; Kidney failure in his son; Liver cancer (age of onset: 45) in his brother; Macular degeneration in his mother.    Social History:  reports that he has never smoked. He has never used smokeless tobacco. He reports that he does not drink alcohol and does not use drugs.    Home Medications:  Prior to Admission medications    Medication Sig Start Date End Date Taking? Authorizing Provider   aspirin 81 MG EC tablet Take 81 mg by mouth Daily.   Yes Alonzo Dean MD   atorvastatin (LIPITOR) 20 MG tablet Take 1 tablet by mouth Every Night for 30 days. 2/8/22 3/10/22 Yes Erica Welsh MD   buPROPion XL (WELLBUTRIN XL) 150 MG 24 hr tablet TAKE ONE TABLET BY MOUTH EVERY MORNING 6/8/21  Yes Alonzo Dean MD   carvedilol (COREG) 3.125 MG tablet Take 1 tablet by mouth 2 (Two) Times a Day With Meals for 30 days. 2/4/22 3/6/22 Yes Josue Pickens MD   Cholecalciferol (VITAMIN D3) 5000 units capsule capsule Take 5,000 Units by mouth Daily.   Yes Alonzo Dean MD   Continuous Blood Gluc Sensor (Dexcom G6 Sensor) Dipsense Dexcom G6 Sensors, to replace every 10 days, 3 sensors per 30 day period (NDC #[address]-43). Check 6 times a day. Dx: E 11.65 2/8/22  Yes Erica Welsh MD   Continuous Blood Gluc Transmit (Dexcom G6 Transmitter) misc 1 each Every 3  (Three) Months. Dispense 1 Dexcom G6 Transmitter for each 3 month period (NDC #[address]-10). Check 6 times a day. Dx: E 11.65 2/8/22  Yes Erica Welsh MD   famotidine (PEPCID) 20 MG tablet Take 1 tablet by mouth 2 (Two) Times a Day for 30 days. 2/4/22 3/6/22 Yes Josue Pickens MD   Insulin Glargine (BASAGLAR KWIKPEN) 100 UNIT/ML injection pen Inject 4 Units under the skin into the appropriate area as directed Every Night. Patient says he does not take consistently 2/8/22  Yes Erica Welsh MD   insulin glulisine (Apidra) 100 UNIT/ML injection Inject 2 Units under the skin into the appropriate area as directed 3 (Three) Times a Day Before Meals. Patient says he does not take consistently 2/8/22  Yes Erica Welsh MD   levothyroxine (SYNTHROID, LEVOTHROID) 75 MCG tablet Take 1 tablet by mouth Daily. 2/8/22  Yes Erica Welsh MD   tamsulosin (FLOMAX) 0.4 MG capsule 24 hr capsule Take 1 capsule by mouth Every Night.   Yes ProviderAlonzo MD   torsemide (DEMADEX) 20 MG tablet Take 1 tablet by mouth 2 (Two) Times a Day for 30 days. 2/4/22 3/6/22 Yes Josue Pickens MD   apixaban (ELIQUIS) 2.5 MG tablet tablet Take 1 tablet by mouth Every 12 (Twelve) Hours. Indications: Other - full anticoagulation 12/22/21   Flo Sherman MD       Allergies:  Allergies   Allergen Reactions   • Prozac [Fluoxetine Hcl] Other (See Comments)     shaking       Objective     Vitals:   Temp:  [97.7 °F (36.5 °C)-97.8 °F (36.6 °C)] 97.7 °F (36.5 °C)  Heart Rate:  [56-67] 58  Resp:  [14-16] 16  BP: (130-188)/(73-99) 177/87    Intake/Output Summary (Last 24 hours) at 3/1/2022 0953  Last data filed at 3/1/2022 0653  Gross per 24 hour   Intake 1300 ml   Output --   Net 1300 ml       Physical Exam:   Constitutional: Awake, alert, no acute distress.  Chronically ill and frail  HEENT: Sclera anicteric, no conjunctival injection, oral mucosa slightly dry  Neck: Supple, no thyromegaly, no  lymphadenopathy, trachea at midline, no JVD  Respiratory: Clear to auscultation bilaterally, nonlabored respiration  Cardiovascular: Irregularly irregular, no murmurs, no rubs or gallops, no carotid bruit  Gastrointestinal: Positive bowel sounds, abdomen is soft, nontender and nondistended  : No palpable bladder  Musculoskeletal: No edema, no clubbing or cyanosis  Psychiatric: Appropriate affect, cooperative  Neurologic: Oriented x3, moving all extremities, normal speech and mental status  Skin: Warm and dry       Scheduled Meds:        IV Meds:        Results Reviewed:   I have personally reviewed the results from the time of this admission to 3/1/2022 09:53 EST     Lab Results   Component Value Date    GLUCOSE 242 (H) 03/01/2022    CALCIUM 8.8 03/01/2022     03/01/2022    K 4.1 03/01/2022    CO2 18.7 (L) 03/01/2022     03/01/2022    BUN 68 (H) 03/01/2022    CREATININE 4.31 (H) 03/01/2022    EGFRIFAFRI 74 12/12/2017    EGFRIFNONA 19 (L) 02/04/2022    BCR 15.8 03/01/2022    ANIONGAP 14.3 03/01/2022      Lab Results   Component Value Date    MG 1.8 12/15/2021    PHOS 5.4 (H) 12/22/2021    ALBUMIN 3.10 (L) 03/01/2022           Assessment / Plan     ASSESSMENT:  1.  Acute kidney injury on chronic kidney disease stage IV probably related to over-diuresis, recently patient has congestive heart failure has been diuresed he has not been monitoring his weight on regular basis he started becoming weak and he kept taking his diuretics without contacting our office.  2.  Chronic kidney disease stage IV secondary to diabetic nephropathy known history of proteinuria  3.  Diabetes mellitus type 2 with renal complication  4.  Coronary artery disease prior CABG  5.  Ischemic cardiomyopathy  6.  Anemia of chronic kidney disease, hemoglobin 11.5 no HAI is needed at this time  7.  BPH  8.  History of CVA  9.  A. fib    PLAN:  1.  Check random urine for sodium, chloride and protein to creatinine ratio  2.  Check  orthostatic blood pressure  3.  Gentle hydration today  4.  Surveillance labs    Thank you for involving us in the care of Carlito Mo.  Please feel free to call with any questions.    Cameron Olguin MD  03/01/22  09:53 [facility]    [facility]  [phone]      Much of this encounter note is an electronic transcription/translation of spoken language to printed text. The electronic translation of spoken language may permit erroneous, or at times, nonsensical words or phrases to be inadvertently transcribed; Although I have reviewed the note for such errors, some may still exist.

## 2022-03-01 NOTE — PROGRESS NOTES
Trevor Huddleston MD/Candace Diggs DO    Nephrology Miscellaneous Note    Patient Name: Carlito Mo  :1955  Age: 66 y.o.        Patient is a renal patient of Bryan Huddleston, not Trevor Huddleston. Consult received.     Will put in consult for Dr. [facility].         Please feel free to contact me with any questions or concerns.     Candace Diggs DO  [facility]  P: [phone]  F: [phone]  Pager: [phone]

## 2022-03-01 NOTE — CONSULTS
"Adult Nutrition  Assessment/PES    Patient Name:  Carlito Mo  YOB: 1955  MRN: [id number]  Admit Date:  3/1/2022    Assessment Date:  3/1/2022    Comments:  Nutrition consult from nursing admission screen. Pt with weakness, fall, A/CKD, Hx CHF DM.  States appetite hasn't been as good as normal.  Ate ~50-75% of his lunch.  His weight is down about 23 lbs in the past 2-3 months. Renal consult is pending. Will add some Boost Glucose Control and see how he does with his intake.      Reason for Assessment     Row Name 03/01/22 2488          Reason for Assessment    Reason For Assessment nurse/nurse practitioner consult; per organizational policy     Diagnosis renal disease; cardiac disease  generalized weakness , Fall, CHF, A/CKD, DM     Identified At Risk by Screening Criteria MST SCORE 2+; reduced oral intake over the last month; unintentional loss of 10 lbs or more in the past 2 mos                Nutrition/Diet History     Row Name 03/01/22 1401          Nutrition/Diet History    Typical Food/Fluid Intake poor appetite                Anthropometrics     Row Name 03/01/22 1401 03/01/22 0647       Anthropometrics    Height 182.9 cm (72\") 182.9 cm (72\")    Weight -- 74.8 kg (165 lb)       Admit Weight    Admit Weight 74.8 kg (165 lb) --       Ideal Body Weight (IBW)    Ideal Body Weight (IBW) (kg) 82.07 82.07    % Ideal Body Weight -- 91.2    % of Ideal Body Weight Assessment --  91% IBW --       Usual Body Weight (UBW)    Weight Loss unintentional --    Weight Loss Time Frame 23lb loss in 2-3 months --       Body Mass Index (BMI)    BMI (kg/m2) -- 22.42               Labs/Tests/Procedures/Meds     Row Name 03/01/22 1402          Labs/Procedures/Meds    Lab Results Reviewed reviewed, pertinent     Lab Results Comments BUN, Cr, glu            Diagnostic Tests/Procedures    Diagnostic Test/Procedure Reviewed reviewed, pertinent            Medications    Pertinent Medications Reviewed reviewed, pertinent  " "   Pertinent Medications Comments eliquis, asa, lipitor, vit d, Pepcid, lantus, admelog, synthroid, IVF@75                Physical Findings     Row Name 03/01/22 1403          Physical Findings    Oral/Mouth Cavity poor dentition     Skin other (see comments)  intact                Estimated/Assessed Needs     Row Name 03/01/22 1401 03/01/22 0647       Calculation Measurements    Height 182.9 cm (72\") 182.9 cm (72\")               Nutrition Prescription Ordered     Row Name 03/01/22 1404          Nutrition Prescription PO    Common Modifiers Cardiac; Consistent Carbohydrate                Evaluation of Received Nutrient/Fluid Intake     Row Name 03/01/22 1404          PO Evaluation    Number of Meals 1     % PO Intake 75%               Problem/Interventions:   Problem 1     Row Name 03/01/22 1405          Nutrition Diagnoses Problem 1    Problem 1 Increased Nutrient Needs     Macronutrient Kcal; Protein     Signs/Symptoms (evidenced by) Report of Mnimal PO Intake; Unintended Weight Change     Unintended Weight Change Loss     Number of Pounds Lost 23lb     Weight loss time period 2-3 months                Intervention Goal     Row Name 03/01/22 1406          Intervention Goal    General Maintain nutrition; Disease management/therapy; Improved nutrition related lab(s); Reduce/improve symptoms; Meet nutritional needs for age/condition     PO Tolerate PO; PO intake (%)     PO Intake % 80 %     Weight No significant weight loss                Nutrition Intervention     Row Name 03/01/22 1406          Nutrition Intervention    RD/Tech Action Care plan reviewd; Follow Tx progress; Interview for preference; Encourage intake; Recommend/ordered     Recommended/Ordered Supplement                Nutrition Prescription     Row Name 03/01/22 1425          Nutrition Prescription PO    PO Prescription Begin/change supplement     Supplement Boost Glucose Control     Supplement Frequency 3 times a day     New PO Prescription Ordered? " Yes                Education/Evaluation     Row Name 03/01/22 1406          Education    Education Will Instruct as appropriate            Monitor/Evaluation    Monitor Per protocol; Skin status; Symptoms; I&O; PO intake; Pertinent labs; Weight                 Electronically signed by:  Polly Ambrosio RD  03/01/22 14:27 EST

## 2022-03-01 NOTE — H&P
History and physical    Primary care physician  Dr. cuevas    Chief complaint  Generalized weakness  Status post fall    History of present illness  66-year-old white male who is well-known to our service with history of diabetes hypertension hyperlipidemia and chronic kidney disease stage IV presented to [facility], [place], operated by [facility] emergency room with generalized weakness and he fell at home.  Patient denies any injury.  Patient does have dizziness lightheadedness but denies any chest pain increase shortness of breath abdominal pain nausea vomiting diarrhea.  Patient work-up in ER revealed acute kidney injury on top of chronic kidney disease stage IV admit for management.  Patient has chronic elevated troponin with no chest pain.    PAST MEDICAL HISTORY  • Acute congestive heart failure (HCC)     • CAD (coronary artery disease)     • Cancer (HCC)     • Chronic combined systolic and diastolic congestive heart failure (HCC)     • Chronic ischemic heart disease     • CKD (chronic kidney disease)     • COPD (chronic obstructive pulmonary disease) (HCC)     • Depression     • Diabetes mellitus type 2, uncontrolled, without complications     • Disease of thyroid gland     • Frequent PVCs     • Heart attack (HCC)     • History of coronary artery disease     • History of transfusion     • Hyperlipidemia     • Hypertension     • Hypertensive encephalopathy     • Mental status change     • YAW on CPAP     • RBBB (right bundle branch block)     • Screening for prostate cancer 2011   • Stroke (HCC)     • TIA (transient ischemic attack)        PAST SURGICAL HISTORY              Procedure Laterality Date   • APPENDECTOMY N/A 02/14/2016     Dr. Alexey Dodson   • COLONOSCOPY         over 20 years ago.  no polyps    • COLONOSCOPY N/A 9/18/2018     Procedure: COLONOSCOPY;  Surgeon: Jose Enrique Louie MD;  Location: [facility] ENDOSCOPY;  Service: Gastroenterology   • COLONOSCOPY N/A 9/19/2018     IH, EH, polyps (TAs w/low grade  dysplasia)   • COLONOSCOPY N/A 2020     Procedure: COLONOSCOPY;  Surgeon: Jose Enrique Louie MD;  Location:  [facility] ENDOSCOPY;  Service: Gastroenterology;  Laterality: N/A;  Pre op-Anemia, Melena, History of Polyps  Post op-To Cecum/TI, Polyp, Poor Prep   • CORONARY ARTERY BYPASS GRAFT   2001   • ENDOSCOPY N/A 2020     Procedure: ESOPHAGOGASTRODUODENOSCOPY with biopsies;  Surgeon: Amanda Lovelace MD;  Location:  [facility] ENDOSCOPY;  Service: Gastroenterology;  Laterality: N/A;  pre-anemia, dark stools  post-esophagitis, hiatal hernia   • ENDOSCOPY N/A 9/15/2020     Procedure: ESOPHAGOGASTRODUODENOSCOPY WITH BIOPSIES;  Surgeon: Jose Enrique Louie MD;  Location:  [facility]OS[facility];  Service: Gastroenterology;  Laterality: N/A;  pre: history of melena and esophagitis  post: mild esophagitis and gastritis, small hiatal hernia   • HERNIA REPAIR Left 2019   • TONSILLECTOMY       • VASECTOMY             FAMILY HISTORY           Problem Relation Age of Onset   • Diabetes Mother     • Hypertension Mother     • Macular degeneration Mother     • Alcohol abuse Father     • Cancer Father           lung,  age 65   • Heart disease Father     • Alcohol abuse Brother     • Cirrhosis Brother            age 50   • Liver cancer Brother 45   • Diabetes Son     • Kidney failure Son     • Autism Son        SOCIAL HISTORY                 Socioeconomic History   • Marital status:        Spouse name: Lissette   • Number of children: 3   • Years of education: college   Tobacco Use   • Smoking status: Never Smoker   • Smokeless tobacco: Never Used   • Tobacco comment: daily caffeine   Vaping Use   • Vaping Use: Never used   Substance and Sexual Activity   • Alcohol use: No   • Drug use: No   • Sexual activity: Defer         ALLERGIES  Prozac   Home medications reviewed     REVIEW OF SYSTEMS  Constitutional: Negative for activity change, appetite change and fever.   HENT: Negative for congestion and sore  "throat.    Eyes: Negative.    Respiratory: Negative for cough and shortness of breath.    Cardiovascular: Negative for chest pain and leg swelling.   Gastrointestinal: Negative for abdominal pain, diarrhea and vomiting.   Endocrine: Negative.    Genitourinary: Negative for decreased urine volume and dysuria.   Musculoskeletal: Negative for neck pain.   Skin: Negative for rash and wound.   Allergic/Immunologic: Negative.    Neurological: Positive for weakness. Negative for numbness and headaches.   Hematological: Negative.    Psychiatric/Behavioral: Negative.    All other systems reviewed and are negative.     PHYSICAL EXAM  Blood pressure 92/59, pulse 66, temperature 97.7 °F (36.5 °C), temperature source Oral, resp. rate 16, height 182.9 cm (72\"), weight 74.8 kg (165 lb), SpO2 95 %.    Constitutional:       General: He is not in acute distress.  HENT:      Head: Normocephalic and atraumatic.   Eyes:      Pupils: Pupils are equal, round, and reactive to light.   Cardiovascular:      Rate and Rhythm: Normal rate and regular rhythm.      Heart sounds: Normal heart sounds.   Pulmonary:      Effort: Pulmonary effort is normal. No respiratory distress.      Breath sounds: Normal breath sounds.   Abdominal:      Palpations: Abdomen is soft.      Tenderness: There is no abdominal tenderness. There is no guarding or rebound.   Musculoskeletal:         General: Normal range of motion.      Cervical back: Normal range of motion and neck supple.   Skin:     General: Skin is warm and dry.   Neurological:      Mental Status: He is alert and oriented to person, place, and time.      Sensory: Sensation is intact.   Psychiatric:         Mood and Affect: Mood and affect normal.      LAB RESULTS  Lab Results (last 24 hours)     Procedure Component Value Units Date/Time    COVID PRE-OP / PRE-PROCEDURE SCREENING ORDER (NO ISOLATION) - Swab, Nasopharynx [[id number]]  (Normal) Collected: 03/01/22 0530    Specimen: Swab from Nasopharynx " Updated: 03/01/22 0603    Narrative:      The following orders were created for panel order COVID PRE-OP / PRE-PROCEDURE SCREENING ORDER (NO ISOLATION) - Swab, Nasopharynx.  Procedure                               Abnormality         Status                     ---------                               -----------         ------                     COVID-19,[facility]HOU[facility]...[[id number]]  Normal              Final result                 Please view results for these tests on the individual orders.    COVID-19,[facility] CEPHEID/ERROL NP SWAB IN TRANSPORT MEDIA 8-12 HR TAT - Swab, Nasopharynx [[id number]]  (Normal) Collected: 03/01/22 0530    Specimen: Swab from Nasopharynx Updated: 03/01/22 0603     COVID19 Not Detected    Narrative:      Fact sheet for providers: https://www.fda.gov/media/750206/download    Fact sheet for patients: https://www.fda.gov/media/965384/download    Test performed by PCR.    Comprehensive Metabolic Panel [[id number]]  (Abnormal) Collected: 03/01/22 0402    Specimen: Blood Updated: 03/01/22 0502     Glucose 242 mg/dL      BUN 68 mg/dL      Creatinine 4.31 mg/dL      Sodium 138 mmol/L      Potassium 4.1 mmol/L      Comment: Slight hemolysis detected by analyzer. Results may be affected.        Chloride 105 mmol/L      CO2 18.7 mmol/L      Calcium 8.8 mg/dL      Total Protein 6.2 g/dL      Albumin 3.10 g/dL      ALT (SGPT) 60 U/L      AST (SGOT) 33 U/L      Comment: Slight hemolysis detected by analyzer. Results may be affected.        Alkaline Phosphatase 97 U/L      Total Bilirubin 0.2 mg/dL      Globulin 3.1 gm/dL      A/G Ratio 1.0 g/dL      BUN/Creatinine Ratio 15.8     Anion Gap 14.3 mmol/L      eGFR 14.4 mL/min/1.73      Comment: <15 Indicative of kidney failure       Narrative:      GFR Normal >60  Chronic Kidney Disease <60  Kidney Failure <15      Troponin [[id number]]  (Abnormal) Collected: 03/01/22 0402    Specimen: Blood Updated: 03/01/22 0451     Troponin T 0.126 ng/mL      Narrative:      Troponin T Reference Range:  <= 0.03 ng/mL-   Negative for AMI  >0.03 ng/mL-     Abnormal for myocardial necrosis.  Clinicians would have to utilize clinical acumen, EKG, Troponin and serial changes to determine if it is an Acute Myocardial Infarction or myocardial injury due to an underlying chronic condition.       Results may be falsely decreased if patient taking Biotin.      aPTT [[id number]]  (Abnormal) Collected: 03/01/22 0402    Specimen: Blood Updated: 03/01/22 0448     PTT 21.9 seconds     CBC & Differential [[id number]]  (Abnormal) Collected: 03/01/22 0402    Specimen: Blood Updated: 03/01/22 0439    Narrative:      The following orders were created for panel order CBC & Differential.  Procedure                               Abnormality         Status                     ---------                               -----------         ------                     CBC Auto Differential[[id number]]        Abnormal            Final result               Scan Slide[[id number]]                                       Final result                 Please view results for these tests on the individual orders.    Scan Slide [[id number]] Collected: 03/01/22 0402    Specimen: Blood Updated: 03/01/22 0439    CBC Auto Differential [[id number]]  (Abnormal) Collected: 03/01/22 0402    Specimen: Blood Updated: 03/01/22 0439     WBC 8.30 10*3/mm3      RBC 4.39 10*6/mm3      Hemoglobin 11.9 g/dL      Hematocrit 37.2 %      MCV 84.7 fL      MCH 27.1 pg      MCHC 32.0 g/dL      RDW 14.6 %      RDW-SD 45.2 fl      MPV 11.0 fL      Platelets 240 10*3/mm3      Neutrophil % 79.4 %      Lymphocyte % 13.4 %      Monocyte % 5.9 %      Eosinophil % 0.5 %      Basophil % 0.2 %      Neutrophils, Absolute 6.59 10*3/mm3      Lymphocytes, Absolute 1.11 10*3/mm3      Monocytes, Absolute 0.49 10*3/mm3      Eosinophils, Absolute 0.04 10*3/mm3      Basophils, Absolute 0.02 10*3/mm3     Protime-INR [[id number]]  (Normal) Collected: 03/01/22  0402    Specimen: Blood Updated: 03/01/22 0431     Protime 13.8 Seconds      INR 1.07    Urinalysis With Microscopic If Indicated (No Culture) - Urine, Clean Catch [[id number]]  (Abnormal) Collected: 03/01/22 0402    Specimen: Urine, Clean Catch Updated: 03/01/22 0428     Color, UA Yellow     Appearance, UA Clear     pH, UA <=5.0     Specific Gravity, UA 1.011     Glucose,  mg/dL (Trace)     Ketones, UA Negative     Bilirubin, UA Negative     Blood, UA Negative     Protein, UA >=300 mg/dL (3+)     Leuk Esterase, UA Negative     Nitrite, UA Negative     Urobilinogen, UA 0.2 E.U./dL    Urinalysis, Microscopic Only - Urine, Clean Catch [[id number]] Collected: 03/01/22 0402    Specimen: Urine, Clean Catch Updated: 03/01/22 0428     RBC, UA 0-2 /HPF      WBC, UA 0-2 /HPF      Bacteria, UA None Seen /HPF      Squamous Epithelial Cells, UA 0-2 /HPF      Hyaline Casts, UA 3-6 /LPF      Methodology Automated Microscopy        Imaging Results (Last 24 Hours)     Procedure Component Value Units Date/Time    CT Head Without Contrast [[id number]] Collected: 03/01/22 0430     Updated: 03/01/22 0430    Narrative:        Patient: ECTOR SANCHEZ  Time Out: 04:30  Exam(s): CT HEAD Without Contrast     EXAM:    CT Head Without Intravenous Contrast    CLINICAL HISTORY:     Reason for exam: fall weakness.    TECHNIQUE:    Axial computed tomography images of the head brain without intravenous   contrast.  CTDI is 54.80 mGy and DLP is 1056.60 mGy-cm.  This CT exam was   performed according to the principle of ALARA (As Low As Reasonably   Achievable) by using one or more of the following dose reduction   techniques: automated exposure control, adjustment of the mA and or kV   according to patient size, and or use of iterative reconstruction   technique.    COMPARISON:    MRI brain from May 21, 2021 and CT head from May 20, 2021    FINDINGS:    Brain:  Mild central atrophy and periventricular white matter low   density consistent with  chronic small vessel disease and or senescent   changes, unchanged.  There is an old infarct in the left frontal lobe   subcortical white matter as well as an old infarct in the right   paramidline cerebellum, unchanged.  No acute large vessel infarct or   intracranial hemorrhage is seen.    Ventricles:  Unremarkable.  No ventriculomegaly.    Bones joints:  Unremarkable.  No acute fracture.    Soft tissues:  Unremarkable.    Sinuses:  There is a 2.9 cm polyp or mucous retention cysts in the   right maxillary sinus, similar to previous.  The remaining sinuses and   mastoids appear within normal limits.    Mastoid air cells:  Unremarkable as visualized.  No mastoid effusion.    IMPRESSION:         Mild central atrophy and periventricular white matter low density   consistent with chronic small vessel disease and or senescent changes,   unchanged.  There is an old infarct in the left frontal lobe subcortical   white matter as well as an old infarct in the right paramidline   cerebellum, unchanged.  No acute large vessel infarct or intracranial   hemorrhage is seen.      Impression:          Electronically signed by Fernando Gutierrez MD on 03-01-22 at 0430             ECG 12 Lead  Component   Ref Range & Units 3/1/22 0256 2/3/22 0604 2/2/22 0342 2/1/22 1535 12/15/21 1248 5/20/21 2206   QT Interval   ms 482 P  488  482  491  511  469              HEART RATE= 61  bpm  RR Interval= 988  ms  NE Interval= 68  ms  P Horizontal Axis= 2  deg  P Front Axis= -55  deg  QRSD Interval= 172  ms  QT Interval= 482  ms  QRS Axis= -86  deg  T Wave Axis= 86  deg  - ABNORMAL ECG -  Sinus or ectopic atrial rhythm  Short NE interval  Right bundle branch block  Anteroseptal infarct, age indeterminate             Current Facility-Administered Medications:   •  apixaban (ELIQUIS) tablet 2.5 mg, 2.5 mg, Oral, Q12H, Josue Pickens MD  •  aspirin EC tablet 81 mg, 81 mg, Oral, Daily, Josue Pickens MD  •  atorvastatin (LIPITOR) tablet 20 mg, 20 mg,  Oral, Nightly, Josue Pickens MD  •  buPROPion XL (WELLBUTRIN XL) 24 hr tablet 150 mg, 150 mg, Oral, QAM, Josue Pickens MD  •  carvedilol (COREG) tablet 3.125 mg, 3.125 mg, Oral, BID With Meals, Josue Pickens MD  •  cholecalciferol (VITAMIN D3) tablet 5,000 Units, 5,000 Units, Oral, Daily, Josue Pickens MD  •  famotidine (PEPCID) tablet 20 mg, 20 mg, Oral, BID, Josue Pickens MD  •  insulin glargine (LANTUS, SEMGLEE) injection 4 Units, 4 Units, Subcutaneous, Nightly, Josue Pickens MD  •  insulin lispro (ADMELOG) injection 0-7 Units, 0-7 Units, Subcutaneous, 4x Daily With Meals & Nightly, Josue Pickens MD  •  insulin lispro (ADMELOG) injection 2 Units, 2 Units, Subcutaneous, TID AC, Josue Pickens MD  •  levothyroxine (SYNTHROID, LEVOTHROID) tablet 75 mcg, 75 mcg, Oral, Daily, Josue Pickens MD  •  [COMPLETED] Insert peripheral IV, , , Once **AND** sodium chloride 0.9 % flush 10 mL, 10 mL, Intravenous, PRN, Sergio Burgess MD  •  sodium chloride 0.9 % infusion, 75 mL/hr, Intravenous, Continuous, Josue Pickens MD, Last Rate: 75 mL/hr at 03/01/22 1217, 75 mL/hr at 03/01/22 1217  •  tamsulosin (FLOMAX) 24 hr capsule 0.4 mg, 0.4 mg, Oral, Nightly, Josue Pickens MD    Current Outpatient Medications:   •  aspirin 81 MG EC tablet, Take 81 mg by mouth Daily., Disp: , Rfl:   •  atorvastatin (LIPITOR) 20 MG tablet, Take 1 tablet by mouth Every Night for 30 days., Disp: 30 tablet, Rfl: 3  •  buPROPion XL (WELLBUTRIN XL) 150 MG 24 hr tablet, TAKE ONE TABLET BY MOUTH EVERY MORNING, Disp: , Rfl:   •  carvedilol (COREG) 3.125 MG tablet, Take 1 tablet by mouth 2 (Two) Times a Day With Meals for 30 days., Disp: 60 tablet, Rfl: 0  •  Cholecalciferol (VITAMIN D3) 5000 units capsule capsule, Take 5,000 Units by mouth Daily., Disp: , Rfl:   •  Continuous Blood Gluc Sensor (Dexcom G6 Sensor), Dipsense Dexcom G6 Sensors, to replace every 10 days, 3 sensors per 30 day period ([facility] #[address]-31). Check 6 times a day. Dx: E 11.65, Disp: 9  each, Rfl: 4  •  Continuous Blood Gluc Transmit (Dexcom G6 Transmitter) misc, 1 each Every 3 (Three) Months. Dispense 1 Dexcom G6 Transmitter for each 3 month period ([facility] #[address]-46). Check 6 times a day. Dx: E 11.65, Disp: 1 each, Rfl: 4  •  famotidine (PEPCID) 20 MG tablet, Take 1 tablet by mouth 2 (Two) Times a Day for 30 days., Disp: 60 tablet, Rfl: 0  •  Insulin Glargine (BASAGLAR KWIKPEN) 100 UNIT/ML injection pen, Inject 4 Units under the skin into the appropriate area as directed Every Night. Patient says he does not take consistently, Disp: 15 mL, Rfl: 3  •  insulin glulisine (Apidra) 100 UNIT/ML injection, Inject 2 Units under the skin into the appropriate area as directed 3 (Three) Times a Day Before Meals. Patient says he does not take consistently, Disp: 15 mL, Rfl: 3  •  levothyroxine (SYNTHROID, LEVOTHROID) 75 MCG tablet, Take 1 tablet by mouth Daily., Disp: 30 tablet, Rfl: 5  •  tamsulosin (FLOMAX) 0.4 MG capsule 24 hr capsule, Take 1 capsule by mouth Every Night., Disp: , Rfl:   •  torsemide (DEMADEX) 20 MG tablet, Take 1 tablet by mouth 2 (Two) Times a Day for 30 days., Disp: 60 tablet, Rfl: 0  •  apixaban (ELIQUIS) 2.5 MG tablet tablet, Take 1 tablet by mouth Every 12 (Twelve) Hours. Indications: Other - full anticoagulation, Disp: 60 tablet, Rfl: 0    ASSESSMENT  Acute kidney injury   Chronic kidney disease stage IV  Elevated troponin with no chest pain  Diabetes mellitus  Hypertension with low BP  Hyperlipidemia  Gastroesophageal reflux disease    PLAN  Admit  IVF  Hold diuretics   Nephrology consult  Cardiology to follow patient  Adjust home medications  Stress ulcer DVT prophylaxis  Supportive care   Patient is full code  Discussed with nursing staff   Follow closely and further recommendation current hospital course    ORA MCDONALD MD

## 2022-03-01 NOTE — CONSULTS
[facility]        Patient Name: Carlito Mo  Age/Sex: 66 y.o. male  : 1955  MRN: [id number]    Date of Admission: 3/1/2022  Date of Encounter Visit: 22  Encounter Provider: JI Guthrie  Referring Provider: Marcos Barcenas MD  Place of Service: [facility] CARDIOLOGY  Patient Care Team:  Florencia Caballero MD as PCP - General (Internal Medicine)  Amber Murguia MD as Consulting Physician (Cardiology)  Sera La RPH as Pharmacist  OSeth Conte MD as Surgeon (General Surgery)  Kim Hoyos MD PhD as Consulting Physician (Hematology and Oncology)  Jerome Diallo MD as Referring Physician (Family Medicine)  Jose Enrique Louie MD as Consulting Physician (Gastroenterology)  Nima Huddleston MD as Consulting Physician (Nephrology)    Subjective:     Chief Complaint: Generalized weakness, fall    Reason for consult: Chronically elevated troponin    History of Present Illness:  Carlito Mo is a 66 y.o. malewho follows Dr. Murguia in our office.  Patient was admitted with generalized weakness, fall.  We have been asked to be seen for chronically elevated troponin.  Patient is well-known to our service with multiple hospital admissions.  He has history significant for hypertension, hyperlipidemia, diabetes, CKD, history of CVA, CAD with prior CABG, chronic systolic heart failure.    Patient was hospitalized 2/3/2022 for hypertensive urgency.  At that time he was also found to be in exacerbation of heart failure and diuretics were added to his regimen.  Patient was evaluated by nephrology on 2022 where he was having significant edema.  At that time he was resumed on his diuretic regimen.  He ultimately lost weight and continued to have generalized weakness but did not weigh himself daily or notify nephrology office that he has continued on his current diuretic regimen.    Patient presented to the emergency department  yesterday evening with complaints of increasing weakness and a fall last night.  Denied injury during the fall.  Denied chest pain, dyspnea, lightheadedness prior to fall.  ECG revealed normal sinus rhythm with RBBB, ECG tracings unchanged.  Patient was SYLVAIN on CKD elevated at 4.31.  This is up approximately one-point from his baseline.  Patient's albumin also 0.10.  Troponin chronically elevated and noted to be 0.126, which is baseline for patient.    Patient states that he is not experiencing any chest pain or dyspnea.  Only complaint is generalized weakness.  He states that he is not experiencing any volume overload or edema he does admit that he continued to take his diuretics without weighing daily.  Current BP in the upper 90s systolic.    Prior Cardiac Testin. Echocardiogram .  Severe hypokinesis.  LVEF 46-50%.  Mild LVH.  Grade 1 diastolic dysfunction.  Normal RV cavity size and systolic function.      Past Medical History:  Past Medical History:   Diagnosis Date   • Acquired hypothyroidism    • Acute congestive heart failure (HCC)    • Acute respiratory failure with hypoxia (HCC)    • Acute sinusitis    • SYLVAIN (acute kidney injury) (HCC)     on CKD   • Anemia    • Arthritis    • CAD (coronary artery disease)    • Cancer (HCC)     skin   • Chronic combined systolic and diastolic congestive heart failure (HCC)    • Chronic ischemic heart disease    • CKD (chronic kidney disease)    • COPD (chronic obstructive pulmonary disease) (HCC)    • Depression    • Diabetes mellitus type 2, uncontrolled, without complications    • Disease of thyroid gland    • Elevated cholesterol    • Fatigue    • Frequent PVCs    • GERD (gastroesophageal reflux disease)    • Heart attack (HCC)    • History of coronary artery disease     with remote history of bypass surgery in    • History of transfusion    • Hyperglycemia    • Hyperlipidemia    • Hypertension    • Hypertensive encephalopathy    • Mental status change      Acute   • YAW on CPAP    • Pleural effusion    • Pneumonia    • RBBB (right bundle branch block)    • Screening for prostate cancer 2011   • Stroke (HCC)    • TIA (transient ischemic attack)    • Type 2 diabetes mellitus (HCC)    • Urinary retention    • Vitamin D deficiency        Past Surgical History:   Procedure Laterality Date   • APPENDECTOMY N/A 02/14/2016    Dr. Alexey Dodson   • COLONOSCOPY      over 20 years ago.  no polyps    • COLONOSCOPY N/A 9/18/2018    Procedure: COLONOSCOPY;  Surgeon: Jose Enrique Louie MD;  Location: [facility] ENDOSCOPY;  Service: Gastroenterology   • COLONOSCOPY N/A 9/19/2018    IH, EH, polyps (TAs w/low grade dysplasia)   • COLONOSCOPY N/A 6/1/2020    Procedure: COLONOSCOPY;  Surgeon: Jose Enrique Louie MD;  Location: [facility] ENDOSCOPY;  Service: Gastroenterology;  Laterality: N/A;  Pre op-Anemia, Melena, History of Polyps  Post op-To Cecum/TI, Polyp, Poor Prep   • CORONARY ARTERY BYPASS GRAFT  11/2001   • ENDOSCOPY N/A 5/29/2020    Procedure: ESOPHAGOGASTRODUODENOSCOPY with biopsies;  Surgeon: Amanda Lovelace MD;  Location: [facility] ENDOSCOPY;  Service: Gastroenterology;  Laterality: N/A;  pre-anemia, dark stools  post-esophagitis, hiatal hernia   • ENDOSCOPY N/A 9/15/2020    Procedure: ESOPHAGOGASTRODUODENOSCOPY WITH BIOPSIES;  Surgeon: Jose Enrique Louie MD;  Location: [facility] ENDOSCOPY;  Service: Gastroenterology;  Laterality: N/A;  pre: history of melena and esophagitis  post: mild esophagitis and gastritis, small hiatal hernia   • HERNIA REPAIR Left 12/05/2019   • TONSILLECTOMY     • VASECTOMY         Home Medications:   (Not in a hospital admission)      Allergies:  Allergies   Allergen Reactions   • Prozac [Fluoxetine Hcl] Other (See Comments)     shaking       Past Social History:  Social History     Socioeconomic History   • Marital status:      Spouse name: Lissette   • Number of children: 3   • Years of education: college   Tobacco Use   • Smoking status:  Never Smoker   • Smokeless tobacco: Never Used   • Tobacco comment: daily caffeine   Vaping Use   • Vaping Use: Never used   Substance and Sexual Activity   • Alcohol use: No   • Drug use: No   • Sexual activity: Defer       Past Family History:  Family History   Problem Relation Age of Onset   • Diabetes Mother    • Hypertension Mother    • Macular degeneration Mother    • Alcohol abuse Father    • Cancer Father         lung,  age 65   • Heart disease Father    • Alcohol abuse Brother    • Cirrhosis Brother          age 50   • Liver cancer Brother 45   • Diabetes Son    • Kidney failure Son    • Autism Son        Review of Systems   Constitutional: Negative for chills, fever, weight gain and weight loss.   Cardiovascular: Negative for leg swelling.   Respiratory: Negative for cough, snoring and wheezing.    Hematologic/Lymphatic: Negative for bleeding problem. Does not bruise/bleed easily.   Skin: Negative for color change.   Musculoskeletal: Positive for falls. Negative for joint pain and myalgias.   Gastrointestinal: Negative for melena.   Genitourinary: Negative for hematuria.   Neurological: Positive for light-headedness and weakness. Negative for excessive daytime sleepiness.   Psychiatric/Behavioral: Negative for depression. The patient is not nervous/anxious.          Objective:   Temp:  [97.7 °F (36.5 °C)-97.8 °F (36.6 °C)] 97.8 °F (36.6 °C)  Heart Rate:  [56-67] 66  Resp:  [14-16] 16  BP: ()/(59-99) 156/73     Intake/Output Summary (Last 24 hours) at 3/1/2022 1359  Last data filed at 3/1/2022 1337  Gross per 24 hour   Intake 1660 ml   Output 450 ml   Net 1210 ml     Body mass index is 22.38 kg/m².      22  0647   Weight: 74.8 kg (165 lb)     Weight change:     Constitutional:       Appearance: Normal appearance. Well-developed.   Eyes:      Conjunctiva/sclera: Conjunctivae normal.   Neck:      Vascular: No carotid bruit.   Pulmonary:      Effort: Pulmonary effort is normal.       Breath sounds: Normal breath sounds.   Cardiovascular:      Normal rate. Regular rhythm. Normal S1. Normal S2.      Murmurs: There is no murmur.      No gallop. No click. No rub.   Edema:     Peripheral edema absent.   Musculoskeletal: Normal range of motion. Skin:     General: Skin is warm and dry.   Neurological:      Mental Status: Alert and oriented to person, place, and time.      GCS: GCS eye subscore is 4. GCS verbal subscore is 5. GCS motor subscore is 6.   Psychiatric:         Speech: Speech normal.         Behavior: Behavior normal.         Thought Content: Thought content normal.         Judgment: Judgment normal.           Lab Review:   Results from last 7 days   Lab Units 03/01/22  0402   SODIUM mmol/L 138   POTASSIUM mmol/L 4.1   CHLORIDE mmol/L 105   CO2 mmol/L 18.7*   BUN mg/dL 68*   CREATININE mg/dL 4.31*   GLUCOSE mg/dL 242*   CALCIUM mg/dL 8.8   AST (SGOT) U/L 33   ALT (SGPT) U/L 60*     Results from last 7 days   Lab Units 03/01/22  0402   TROPONIN T ng/mL 0.126*     Results from last 7 days   Lab Units 03/01/22  0402   WBC 10*3/mm3 8.30   HEMOGLOBIN g/dL 11.9*   HEMATOCRIT % 37.2*   PLATELETS 10*3/mm3 240     Results from last 7 days   Lab Units 03/01/22  0402   INR  1.07   APTT seconds 21.9*               Invalid input(s): LDLCALC            Echo EF Estimated  No results found for: ECHOEFEST    EKG:   I personally viewed and interpreted the patient's EKG    Imaging:  Imaging Results (Most Recent)     Procedure Component Value Units Date/Time    CT Head Without Contrast [[id number]] Collected: 03/01/22 0430     Updated: 03/01/22 0430    Narrative:        Patient: ECTOR SANCHEZ  Time Out: 04:30  Exam(s): CT HEAD Without Contrast     EXAM:    CT Head Without Intravenous Contrast    CLINICAL HISTORY:     Reason for exam: fall weakness.    TECHNIQUE:    Axial computed tomography images of the head brain without intravenous   contrast.  CTDI is 54.80 mGy and DLP is 1056.60 mGy-cm.  This CT exam was    performed according to the principle of ALARA (As Low As Reasonably   Achievable) by using one or more of the following dose reduction   techniques: automated exposure control, adjustment of the mA and or kV   according to patient size, and or use of iterative reconstruction   technique.    COMPARISON:    MRI brain from May 21, 2021 and CT head from May 20, 2021    FINDINGS:    Brain:  Mild central atrophy and periventricular white matter low   density consistent with chronic small vessel disease and or senescent   changes, unchanged.  There is an old infarct in the left frontal lobe   subcortical white matter as well as an old infarct in the right   paramidline cerebellum, unchanged.  No acute large vessel infarct or   intracranial hemorrhage is seen.    Ventricles:  Unremarkable.  No ventriculomegaly.    Bones joints:  Unremarkable.  No acute fracture.    Soft tissues:  Unremarkable.    Sinuses:  There is a 2.9 cm polyp or mucous retention cysts in the   right maxillary sinus, similar to previous.  The remaining sinuses and   mastoids appear within normal limits.    Mastoid air cells:  Unremarkable as visualized.  No mastoid effusion.    IMPRESSION:         Mild central atrophy and periventricular white matter low density   consistent with chronic small vessel disease and or senescent changes,   unchanged.  There is an old infarct in the left frontal lobe subcortical   white matter as well as an old infarct in the right paramidline   cerebellum, unchanged.  No acute large vessel infarct or intracranial   hemorrhage is seen.      Impression:          Electronically signed by Fernando Gutierrez MD on 03-01-22 at 0430              Assessment:       Generalized weakness        Plan:     1. Generalized weakness  2. Fall  3. SYLVAIN on CKD  4. Chronically elevated troponin    Carlito Mo is a 66 y.o. who is well-known to our service.  He is admitted to the hospital for evaluation of generalized weakness and fall x1 last  evening.  No injury suffered from fall.  Our service is consulted for chronically elevated troponin.  Patient's troponin is 0.126 which is baseline for him compared to all prior troponins.  Patient was admitted several times over the past 2 months and troponin was around this level during all admissions.  I suspect that patient's troponin is chronically elevated due to poor renal excretion as his creatinine is elevated at 4.31.  This is up approximately one-point from baseline.  Elevated creatinine is thought to be from overdiuresis and nephrology is following and doing slow hydration.  Patient currently denies any chest pain.  He did have echocardiogram within the past month which reveals global hypokinesis but no new LV wall motion abnormalities.  LVEF stable for patient.  At this point I would trend troponins as long as they stay close to baseline I would not recommend any further cardiac testing.  If troponins remain stable cardiovascular services will sign off tomorrow.    Thank you for allowing me to participate in the care of Carlito Mo. Feel free to contact me directly with any further questions or concerns.    JI Guthrie  [facility]  03/01/22  13:59 EST      EMR Dragon/Transcription disclaimer:   Much of this encounter note is an electronic transcription/translation of spoken language to printed text. The electronic translation of spoken language may permit erroneous, or at times, nonsensical words or phrases to be inadvertently transcribed; Although I have reviewed the note for such errors, some may still exist.

## 2022-03-01 NOTE — OUTREACH NOTE
Medical Week 3 Survey      Responses   [facility] patient discharged from? [place]   Does the patient have one of the following disease processes/diagnoses(primary or secondary)? CHF   Revoke Readmitted          Belle Snyder RN

## 2022-03-01 NOTE — ED TRIAGE NOTES
"Pt to ER from home via [facility] (incident # [id number]).    EMS states they were originally called for a fall/lift assist but they found the pt to by hypotensive on scene with a pressure of 84/60.     Pt reports increasing weakness over the last week.     Pt denies LOC or blood thinners, says he \"slightly\" hit his head.    Pt has hx stroke with residual L side weakness.    Pt insulin dependent diabetic, states he has not had any insulin today.       Pt masked in triage, staff in appropriate ppe.    "

## 2022-03-01 NOTE — ED PROVIDER NOTES
EMERGENCY DEPARTMENT ENCOUNTER    CHIEF COMPLAINT  Chief Complaint: Fall/Weakness  History given by: Patient  History limited by: None  Room Number: 13/13  PMD: Florencia Caballero MD      HPI:  Pt is a 66 y.o. male who presents complaining of a fall with associated generalized weakness that occurred just prior to ED arrival today.  The patient states that he has been increasingly weak for some time now but it became significantly worse after his fall tonight.  He states that he does not believe that he injured anything in his fall.  He denies chest pain, shortness of breath, headache, nausea/vomiting, or diarrhea/constipation.    Duration: Ongoing since just prior to ED arrival today  Onset: Sudden  Location: Generalized  Radiation: None  Quality: Weakness  Intensity/Severity: Moderate  Progression: Worsening  Associated Symptoms: None  Aggravating Factors: None  Alleviating Factors: None  Previous Episodes: None  Treatment before arrival: None    PAST MEDICAL HISTORY  Active Ambulatory Problems     Diagnosis Date Noted   • Major depressive disorder with single episode, in partial remission (HCC)    • Other hyperlipidemia    • Vitamin D deficiency 12/17/2015   • Erectile dysfunction 12/17/2015   • Chronic fatigue 04/25/2016   • Type 2 diabetes mellitus with ophthalmic complication ([facility]) 04/25/2016   • Skin lesion of chest wall 06/20/2016   • Slow transit constipation 09/01/2016   • Benign prostatic hyperplasia with nocturia 12/20/2016   • History of basal cell carcinoma 12/20/2016   • High blood pressure associated with diabetes ([facility]) 12/20/2016   • Acquired hypothyroidism 12/20/2017   • Hypertensive urgency 02/16/2018   • Recurrent strokes ([facility]) 02/20/2018   • Urinary retention 09/20/2018   • Pneumonia 09/21/2018   • Acute on chronic combined systolic and diastolic congestive heart failure ([facility]) 09/21/2018   • Acute respiratory failure with hypoxia ([facility]) 09/21/2018   • Suspected sleep apnea 10/29/2018   • Pleural  effusion 12/01/2018   • YAW (obstructive sleep apnea) 12/13/2018   • Coronary artery disease involving native coronary artery of native heart without angina pectoris 04/18/2019   • Chronically elevated troponin 07/02/2019   • Colon cancer screening 10/30/2018   • Enlarged lymph nodes 10/30/2018   • Functional gait abnormality 10/30/2018   • History of myocardial infarction 02/06/2019   • Hospital discharge follow-up 05/31/2019   • Insomnia 02/06/2019   • Iron deficiency anemia 10/02/2018   • Major depression, recurrent ([facility]) 10/16/2012   • Anemia, chronic renal failure, stage 3 (moderate) ([facility]) 02/27/2020   • Essential hypertension 03/10/2020   • Adverse effect of iron and its compounds, initial encounter 05/23/2020   • Acute on chronic renal insufficiency 05/25/2020   • Debility 05/25/2020   • Falls frequently 05/25/2020   • Acute pain of right shoulder 05/27/2020   • Acute on chronic anemia 05/25/2020   • Heme positive stool 05/25/2020   • Neurogenic orthostatic hypotension (HCC) 05/30/2020   • Symptomatic anemia 05/25/2020   • History of colon polyps 05/25/2020   • Helicobacter pylori gastritis 06/04/2020   • Esophagitis 06/10/2020   • Sleep related hypoxia 07/23/2020   • Embolic stroke ([facility]) 05/20/2021   • Patent foramen ovale 05/22/2021   • Pleural effusion, bilateral 05/22/2021   • Cardiomyopathy ([facility]) 05/24/2021   • Lower abdominal pain 12/13/2021   • Diarrhea 12/14/2021   • CKD (chronic kidney disease) stage 4, GFR 15-29 ml/min ([facility]) 12/16/2021   • Hypertension not at goal 02/02/2022   • Memory loss due to medical condition 02/08/2022     Resolved Ambulatory Problems     Diagnosis Date Noted   • Anemia    • Arthritis    • Diabetes mellitus out of control ([facility])    • Type 2 diabetes mellitus ([facility])    • Acute sinusitis    • Accumulation of fluid in tissues 12/17/2015   • Fatigue 12/17/2015   • Cardiomyopathy, ischemic 12/17/2015   • Acute appendicitis 03/02/2016   • Atherosclerosis of coronary artery 10/16/2012    • Altered mental status 11/30/2017   • Hypertensive encephalopathy syndrome 04/30/2018   • History of medication noncompliance 05/04/2018   • Hyperglycemia 05/04/2018   • Screening for colorectal cancer 08/22/2018   • Constipation 08/22/2018   • SYLVAIN (acute kidney injury) (HCC) 09/20/2018   • Snoring 12/13/2018   • Lower abdominal pain 05/27/2020   • Hypertensive emergency 02/01/2022     Past Medical History:   Diagnosis Date   • Acute congestive heart failure (HCC)    • CAD (coronary artery disease)    • Cancer (HCC)    • Chronic combined systolic and diastolic congestive heart failure (HCC)    • Chronic ischemic heart disease    • CKD (chronic kidney disease)    • COPD (chronic obstructive pulmonary disease) (HCC)    • Depression    • Diabetes mellitus type 2, uncontrolled, without complications    • Disease of thyroid gland    • Frequent PVCs    • Heart attack (HCC)    • History of coronary artery disease    • History of transfusion    • Hyperlipidemia    • Hypertension    • Hypertensive encephalopathy    • Mental status change    • YAW on CPAP    • RBBB (right bundle branch block)    • Screening for prostate cancer 2011   • Stroke (HCC)    • TIA (transient ischemic attack)        PAST SURGICAL HISTORY  Past Surgical History:   Procedure Laterality Date   • APPENDECTOMY N/A 02/14/2016    Dr. Alexey Dodson   • COLONOSCOPY      over 20 years ago.  no polyps    • COLONOSCOPY N/A 9/18/2018    Procedure: COLONOSCOPY;  Surgeon: Jose Enrique Louie MD;  Location: [facility] ENDOSCOPY;  Service: Gastroenterology   • COLONOSCOPY N/A 9/19/2018    IH, EH, polyps (TAs w/low grade dysplasia)   • COLONOSCOPY N/A 6/1/2020    Procedure: COLONOSCOPY;  Surgeon: Jose Enrique Louie MD;  Location: [facility] ENDOSCOPY;  Service: Gastroenterology;  Laterality: N/A;  Pre op-Anemia, Melena, History of Polyps  Post op-To Cecum/TI, Polyp, Poor Prep   • CORONARY ARTERY BYPASS GRAFT  11/2001   • ENDOSCOPY N/A 5/29/2020    Procedure:  ESOPHAGOGASTRODUODENOSCOPY with biopsies;  Surgeon: Amanda Lovelace MD;  Location: [facility] ENDOSCOPY;  Service: Gastroenterology;  Laterality: N/A;  pre-anemia, dark stools  post-esophagitis, hiatal hernia   • ENDOSCOPY N/A 9/15/2020    Procedure: ESOPHAGOGASTRODUODENOSCOPY WITH BIOPSIES;  Surgeon: Jose Enrique Louie MD;  Location: [facility] ENDOSCOPY;  Service: Gastroenterology;  Laterality: N/A;  pre: history of melena and esophagitis  post: mild esophagitis and gastritis, small hiatal hernia   • HERNIA REPAIR Left 2019   • TONSILLECTOMY     • VASECTOMY         FAMILY HISTORY  Family History   Problem Relation Age of Onset   • Diabetes Mother    • Hypertension Mother    • Macular degeneration Mother    • Alcohol abuse Father    • Cancer Father         lung,  age 65   • Heart disease Father    • Alcohol abuse Brother    • Cirrhosis Brother          age 50   • Liver cancer Brother 45   • Diabetes Son    • Kidney failure Son    • Autism Son        SOCIAL HISTORY  Social History     Socioeconomic History   • Marital status:      Spouse name: Lissette   • Number of children: 3   • Years of education: college   Tobacco Use   • Smoking status: Never Smoker   • Smokeless tobacco: Never Used   • Tobacco comment: daily caffeine   Vaping Use   • Vaping Use: Never used   Substance and Sexual Activity   • Alcohol use: No   • Drug use: No   • Sexual activity: Defer       ALLERGIES  Prozac [fluoxetine hcl]    REVIEW OF SYSTEMS  Review of Systems   Constitutional: Negative for activity change, appetite change and fever.   HENT: Negative for congestion and sore throat.    Eyes: Negative.    Respiratory: Negative for cough and shortness of breath.    Cardiovascular: Negative for chest pain and leg swelling.   Gastrointestinal: Negative for abdominal pain, diarrhea and vomiting.   Endocrine: Negative.    Genitourinary: Negative for decreased urine volume and dysuria.   Musculoskeletal: Negative for neck  pain.   Skin: Negative for rash and wound.   Allergic/Immunologic: Negative.    Neurological: Positive for weakness. Negative for numbness and headaches.   Hematological: Negative.    Psychiatric/Behavioral: Negative.    All other systems reviewed and are negative.      PHYSICAL EXAM  ED Triage Vitals [03/01/22 0036]   Temp Heart Rate Resp BP SpO2   97.8 °F (36.6 °C) 56 14 130/73 99 %      Temp src Heart Rate Source Patient Position BP Location FiO2 (%)   -- -- -- -- --       Physical Exam  Vitals and nursing note reviewed.   Constitutional:       General: He is not in acute distress.  HENT:      Head: Normocephalic and atraumatic.   Eyes:      Pupils: Pupils are equal, round, and reactive to light.   Cardiovascular:      Rate and Rhythm: Normal rate and regular rhythm.      Heart sounds: Normal heart sounds.   Pulmonary:      Effort: Pulmonary effort is normal. No respiratory distress.      Breath sounds: Normal breath sounds.   Abdominal:      Palpations: Abdomen is soft.      Tenderness: There is no abdominal tenderness. There is no guarding or rebound.   Musculoskeletal:         General: Normal range of motion.      Cervical back: Normal range of motion and neck supple.   Skin:     General: Skin is warm and dry.   Neurological:      Mental Status: He is alert and oriented to person, place, and time.      Sensory: Sensation is intact.   Psychiatric:         Mood and Affect: Mood and affect normal.         LAB RESULTS  Lab Results (last 24 hours)     Procedure Component Value Units Date/Time    CBC & Differential [[id number]]  (Abnormal) Collected: 03/01/22 0402    Specimen: Blood Updated: 03/01/22 0439    Narrative:      The following orders were created for panel order CBC & Differential.  Procedure                               Abnormality         Status                     ---------                               -----------         ------                     CBC Auto Differential[[id number]]        Abnormal             Final result               Scan Slide[[id number]]                                       Final result                 Please view results for these tests on the individual orders.    Comprehensive Metabolic Panel [[id number]]  (Abnormal) Collected: 03/01/22 0402    Specimen: Blood Updated: 03/01/22 0502     Glucose 242 mg/dL      BUN 68 mg/dL      Creatinine 4.31 mg/dL      Sodium 138 mmol/L      Potassium 4.1 mmol/L      Comment: Slight hemolysis detected by analyzer. Results may be affected.        Chloride 105 mmol/L      CO2 18.7 mmol/L      Calcium 8.8 mg/dL      Total Protein 6.2 g/dL      Albumin 3.10 g/dL      ALT (SGPT) 60 U/L      AST (SGOT) 33 U/L      Comment: Slight hemolysis detected by analyzer. Results may be affected.        Alkaline Phosphatase 97 U/L      Total Bilirubin 0.2 mg/dL      Globulin 3.1 gm/dL      A/G Ratio 1.0 g/dL      BUN/Creatinine Ratio 15.8     Anion Gap 14.3 mmol/L      eGFR 14.4 mL/min/1.73      Comment: <15 Indicative of kidney failure       Narrative:      GFR Normal >60  Chronic Kidney Disease <60  Kidney Failure <15      Protime-INR [[id number]]  (Normal) Collected: 03/01/22 0402    Specimen: Blood Updated: 03/01/22 0431     Protime 13.8 Seconds      INR 1.07    aPTT [[id number]]  (Abnormal) Collected: 03/01/22 0402    Specimen: Blood Updated: 03/01/22 0448     PTT 21.9 seconds     Urinalysis With Microscopic If Indicated (No Culture) - Urine, Clean Catch [[id number]]  (Abnormal) Collected: 03/01/22 0402    Specimen: Urine, Clean Catch Updated: 03/01/22 0428     Color, UA Yellow     Appearance, UA Clear     pH, UA <=5.0     Specific Gravity, UA 1.011     Glucose,  mg/dL (Trace)     Ketones, UA Negative     Bilirubin, UA Negative     Blood, UA Negative     Protein, UA >=300 mg/dL (3+)     Leuk Esterase, UA Negative     Nitrite, UA Negative     Urobilinogen, UA 0.2 E.U./dL    Troponin [[id number]]  (Abnormal) Collected: 03/01/22 0402    Specimen: Blood Updated: 03/01/22  0451     Troponin T 0.126 ng/mL     Narrative:      Troponin T Reference Range:  <= 0.03 ng/mL-   Negative for AMI  >0.03 ng/mL-     Abnormal for myocardial necrosis.  Clinicians would have to utilize clinical acumen, EKG, Troponin and serial changes to determine if it is an Acute Myocardial Infarction or myocardial injury due to an underlying chronic condition.       Results may be falsely decreased if patient taking Biotin.      CBC Auto Differential [[id number]]  (Abnormal) Collected: 03/01/22 0402    Specimen: Blood Updated: 03/01/22 0439     WBC 8.30 10*3/mm3      RBC 4.39 10*6/mm3      Hemoglobin 11.9 g/dL      Hematocrit 37.2 %      MCV 84.7 fL      MCH 27.1 pg      MCHC 32.0 g/dL      RDW 14.6 %      RDW-SD 45.2 fl      MPV 11.0 fL      Platelets 240 10*3/mm3      Neutrophil % 79.4 %      Lymphocyte % 13.4 %      Monocyte % 5.9 %      Eosinophil % 0.5 %      Basophil % 0.2 %      Neutrophils, Absolute 6.59 10*3/mm3      Lymphocytes, Absolute 1.11 10*3/mm3      Monocytes, Absolute 0.49 10*3/mm3      Eosinophils, Absolute 0.04 10*3/mm3      Basophils, Absolute 0.02 10*3/mm3     Urinalysis, Microscopic Only - Urine, Clean Catch [[id number]] Collected: 03/01/22 0402    Specimen: Urine, Clean Catch Updated: 03/01/22 0428     RBC, UA 0-2 /HPF      WBC, UA 0-2 /HPF      Bacteria, UA None Seen /HPF      Squamous Epithelial Cells, UA 0-2 /HPF      Hyaline Casts, UA 3-6 /LPF      Methodology Automated Microscopy    Scan Slide [[id number]] Collected: 03/01/22 0402    Specimen: Blood Updated: 03/01/22 0439    COVID PRE-OP / PRE-PROCEDURE SCREENING ORDER (NO ISOLATION) - Swab, Nasopharynx [[id number]] Collected: 03/01/22 0530    Specimen: Swab from Nasopharynx Updated: 03/01/22 0535    Narrative:      The following orders were created for panel order COVID PRE-OP / PRE-PROCEDURE SCREENING ORDER (NO ISOLATION) - Swab, Nasopharynx.  Procedure                               Abnormality         Status                      ---------                               -----------         ------                     COVID-19,[facility] IN-HOUSE...[[id number]]                      In process                   Please view results for these tests on the individual orders.    COVID-19,[facility] IN-HOUSE CEPHEID/ERROL NP SWAB IN TRANSPORT MEDIA 8-12 HR TAT - Swab, Nasopharynx [[id number]] Collected: 03/01/22 0530    Specimen: Swab from Nasopharynx Updated: 03/01/22 0535          I ordered the above labs and reviewed the results    RADIOLOGY  CT Head Without Contrast   Final Result         Electronically signed by Fernando Gutierrez MD on 03-01-22 at 0430           I ordered the above noted radiological studies. Interpreted by radiologist.  Reviewed by me in PACS.       PROCEDURES  Procedures   EKG          EKG time: 0256  Rhythm/Rate: NSR, 61  P waves and IL: nml  QRS, axis: widened with RBBB, RAD   ST and T waves: nml     Interpreted Contemporaneously by me, independently viewed  unchanged compared to prior 2/3/22    PROGRESS AND CONSULTS     The patient was wearing a facemask upon entrance into the room and remained in such throughout their visit.  I was wearing PPE including a facemask, eye protection, as well as gloves at any point entering the room and throughout the visit    0510  On reevaluation, the patient is resting comfortably with stable vital signs.  I did inform the patient that his kidney function is significantly higher than his baseline and his cardiac enzymes are elevated.  Given the significant generalized weakness as well, certainly think he should be admitted to the hospital for further management and treatment.  The patient is in agreement with the plan and all questions have been answered.    0540  Case discussed with JI Crawford for [facility], who agrees to admit the patient to the hospital for Dr. Tse      MEDICAL DECISION MAKING  Results were reviewed/discussed with the patient and they were also made aware of online access.  Pt also made aware that some labs, such as cultures, will not be resulted during ER visit and follow up with PMD is necessary.     MDM  Number of Diagnoses or Management Options     Amount and/or Complexity of Data Reviewed  Clinical lab tests: ordered and reviewed  Tests in the radiology section of CPT®: ordered and reviewed  Tests in the medicine section of CPT®: ordered and reviewed  Review and summarize past medical records: yes (Upon medical records review, the patient was last seen and evaluated on 2/1/2022 secondary to hypertension with generalized fatigue)  Discuss the patient with other providers: yes (JI Crawford for [facility], who will admit the patient for Dr. Tse)  Independent visualization of images, tracings, or specimens: yes (CT scan of the head showing multiple areas of old infarct without acute abnormality)           DIAGNOSIS  Final diagnoses:   Generalized weakness   Acute on chronic renal insufficiency   Fall, initial encounter   Elevated troponin       DISPOSITION  ADMISSION    Discussed treatment plan and reason for admission with pt/family and admitting physician.  Pt/family voiced understanding of the plan for admission for further testing/treatment as needed.         Latest Documented Vital Signs:  As of 05:47 EST  BP- 143/78 HR- 59 Temp- 97.8 °F (36.6 °C) O2 sat- 98%         Sergio Burgess MD  03/01/22 6199
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
## 2022-03-02 LAB
ALBUMIN SERPL-MCNC: 3.3 G/DL (ref 3.5–5.2)
ALBUMIN/GLOB SERPL: 1.3 G/DL
ALP SERPL-CCNC: 89 U/L (ref 39–117)
ALT SERPL W P-5'-P-CCNC: 49 U/L (ref 1–41)
ANION GAP SERPL CALCULATED.3IONS-SCNC: 12.4 MMOL/L (ref 5–15)
AST SERPL-CCNC: 22 U/L (ref 1–40)
BASOPHILS # BLD AUTO: 0.03 10*3/MM3 (ref 0–0.2)
BASOPHILS NFR BLD AUTO: 0.4 % (ref 0–1.5)
BILIRUB SERPL-MCNC: 0.2 MG/DL (ref 0–1.2)
BUN SERPL-MCNC: 68 MG/DL (ref 8–23)
BUN/CREAT SERPL: 17.5 (ref 7–25)
CALCIUM SPEC-SCNC: 9.2 MG/DL (ref 8.6–10.5)
CHLORIDE SERPL-SCNC: 107 MMOL/L (ref 98–107)
CHOLEST SERPL-MCNC: 187 MG/DL (ref 0–200)
CO2 SERPL-SCNC: 20.6 MMOL/L (ref 22–29)
CREAT SERPL-MCNC: 3.88 MG/DL (ref 0.76–1.27)
DEPRECATED RDW RBC AUTO: 44.3 FL (ref 37–54)
EGFRCR SERPLBLD CKD-EPI 2021: 16.3 ML/MIN/1.73
EOSINOPHIL # BLD AUTO: 0.19 10*3/MM3 (ref 0–0.4)
EOSINOPHIL NFR BLD AUTO: 2.8 % (ref 0.3–6.2)
ERYTHROCYTE [DISTWIDTH] IN BLOOD BY AUTOMATED COUNT: 14.7 % (ref 12.3–15.4)
GLOBULIN UR ELPH-MCNC: 2.6 GM/DL
GLUCOSE BLDC GLUCOMTR-MCNC: 110 MG/DL (ref 70–130)
GLUCOSE BLDC GLUCOMTR-MCNC: 231 MG/DL (ref 70–130)
GLUCOSE BLDC GLUCOMTR-MCNC: 280 MG/DL (ref 70–130)
GLUCOSE BLDC GLUCOMTR-MCNC: 79 MG/DL (ref 70–130)
GLUCOSE SERPL-MCNC: 81 MG/DL (ref 65–99)
HBA1C MFR BLD: 9.4 % (ref 4.8–5.6)
HCT VFR BLD AUTO: 35.7 % (ref 37.5–51)
HDLC SERPL-MCNC: 47 MG/DL (ref 40–60)
HGB BLD-MCNC: 11.5 G/DL (ref 13–17.7)
IMM GRANULOCYTES # BLD AUTO: 0.03 10*3/MM3 (ref 0–0.05)
IMM GRANULOCYTES NFR BLD AUTO: 0.4 % (ref 0–0.5)
LDLC SERPL CALC-MCNC: 129 MG/DL (ref 0–100)
LDLC/HDLC SERPL: 2.73 {RATIO}
LYMPHOCYTES # BLD AUTO: 1.64 10*3/MM3 (ref 0.7–3.1)
LYMPHOCYTES NFR BLD AUTO: 24.6 % (ref 19.6–45.3)
MAGNESIUM SERPL-MCNC: 2 MG/DL (ref 1.6–2.4)
MCH RBC QN AUTO: 26.7 PG (ref 26.6–33)
MCHC RBC AUTO-ENTMCNC: 32.2 G/DL (ref 31.5–35.7)
MCV RBC AUTO: 83 FL (ref 79–97)
MONOCYTES # BLD AUTO: 0.63 10*3/MM3 (ref 0.1–0.9)
MONOCYTES NFR BLD AUTO: 9.4 % (ref 5–12)
NEUTROPHILS NFR BLD AUTO: 4.16 10*3/MM3 (ref 1.7–7)
NEUTROPHILS NFR BLD AUTO: 62.4 % (ref 42.7–76)
NRBC BLD AUTO-RTO: 0 /100 WBC (ref 0–0.2)
NT-PROBNP SERPL-MCNC: 3051 PG/ML (ref 0–900)
PLATELET # BLD AUTO: 250 10*3/MM3 (ref 140–450)
PMV BLD AUTO: 10.9 FL (ref 6–12)
POTASSIUM SERPL-SCNC: 3.9 MMOL/L (ref 3.5–5.2)
PROT SERPL-MCNC: 5.9 G/DL (ref 6–8.5)
RBC # BLD AUTO: 4.3 10*6/MM3 (ref 4.14–5.8)
SODIUM SERPL-SCNC: 140 MMOL/L (ref 136–145)
TRIGL SERPL-MCNC: 59 MG/DL (ref 0–150)
TROPONIN T SERPL-MCNC: 0.11 NG/ML (ref 0–0.03)
TSH SERPL DL<=0.05 MIU/L-ACNC: 1.2 UIU/ML (ref 0.27–4.2)
URATE SERPL-MCNC: 9.3 MG/DL (ref 3.4–7)
VLDLC SERPL-MCNC: 11 MG/DL (ref 5–40)
WBC NRBC COR # BLD: 6.68 10*3/MM3 (ref 3.4–10.8)

## 2022-03-02 PROCEDURE — 80061 LIPID PANEL: CPT | Performed by: HOSPITALIST

## 2022-03-02 PROCEDURE — 84484 ASSAY OF TROPONIN QUANT: CPT | Performed by: HOSPITALIST

## 2022-03-02 PROCEDURE — 97530 THERAPEUTIC ACTIVITIES: CPT

## 2022-03-02 PROCEDURE — 99232 SBSQ HOSP IP/OBS MODERATE 35: CPT | Performed by: INTERNAL MEDICINE

## 2022-03-02 PROCEDURE — 36415 COLL VENOUS BLD VENIPUNCTURE: CPT | Performed by: HOSPITALIST

## 2022-03-02 PROCEDURE — 82962 GLUCOSE BLOOD TEST: CPT

## 2022-03-02 PROCEDURE — 83880 ASSAY OF NATRIURETIC PEPTIDE: CPT | Performed by: HOSPITALIST

## 2022-03-02 PROCEDURE — 84550 ASSAY OF BLOOD/URIC ACID: CPT | Performed by: INTERNAL MEDICINE

## 2022-03-02 PROCEDURE — 83036 HEMOGLOBIN GLYCOSYLATED A1C: CPT | Performed by: HOSPITALIST

## 2022-03-02 PROCEDURE — 97535 SELF CARE MNGMENT TRAINING: CPT

## 2022-03-02 PROCEDURE — 97162 PT EVAL MOD COMPLEX 30 MIN: CPT

## 2022-03-02 PROCEDURE — 84443 ASSAY THYROID STIM HORMONE: CPT | Performed by: HOSPITALIST

## 2022-03-02 PROCEDURE — 97166 OT EVAL MOD COMPLEX 45 MIN: CPT

## 2022-03-02 PROCEDURE — 83735 ASSAY OF MAGNESIUM: CPT | Performed by: INTERNAL MEDICINE

## 2022-03-02 PROCEDURE — 85025 COMPLETE CBC W/AUTO DIFF WBC: CPT | Performed by: INTERNAL MEDICINE

## 2022-03-02 PROCEDURE — 63710000001 INSULIN LISPRO (HUMAN) PER 5 UNITS: Performed by: HOSPITALIST

## 2022-03-02 PROCEDURE — 80053 COMPREHEN METABOLIC PANEL: CPT | Performed by: HOSPITALIST

## 2022-03-02 RX ORDER — ATORVASTATIN CALCIUM 10 MG/1
10 TABLET, FILM COATED ORAL NIGHTLY
Status: DISCONTINUED | OUTPATIENT
Start: 2022-03-02 | End: 2022-03-08 | Stop reason: HOSPADM

## 2022-03-02 RX ORDER — MELATONIN
1000 DAILY
Status: DISCONTINUED | OUTPATIENT
Start: 2022-03-03 | End: 2022-03-08 | Stop reason: HOSPADM

## 2022-03-02 RX ORDER — ZOLPIDEM TARTRATE 5 MG/1
5 TABLET ORAL NIGHTLY PRN
Status: DISCONTINUED | OUTPATIENT
Start: 2022-03-02 | End: 2022-03-08

## 2022-03-02 RX ADMIN — INSULIN LISPRO 3 UNITS: 100 INJECTION, SOLUTION INTRAVENOUS; SUBCUTANEOUS at 21:16

## 2022-03-02 RX ADMIN — CARVEDILOL 3.12 MG: 3.12 TABLET, FILM COATED ORAL at 17:30

## 2022-03-02 RX ADMIN — ASPIRIN 81 MG: 81 TABLET, COATED ORAL at 08:57

## 2022-03-02 RX ADMIN — Medication 5000 UNITS: at 08:57

## 2022-03-02 RX ADMIN — ZOLPIDEM TARTRATE 5 MG: 5 TABLET ORAL at 21:23

## 2022-03-02 RX ADMIN — SODIUM CHLORIDE 75 ML/HR: 9 INJECTION, SOLUTION INTRAVENOUS at 03:45

## 2022-03-02 RX ADMIN — SODIUM CHLORIDE 500 ML: 9 INJECTION, SOLUTION INTRAVENOUS at 06:52

## 2022-03-02 RX ADMIN — BUPROPION HYDROCHLORIDE 150 MG: 150 TABLET, EXTENDED RELEASE ORAL at 08:57

## 2022-03-02 RX ADMIN — FAMOTIDINE 20 MG: 20 TABLET ORAL at 08:57

## 2022-03-02 RX ADMIN — INSULIN GLARGINE-YFGN 10 UNITS: 100 INJECTION, SOLUTION SUBCUTANEOUS at 21:15

## 2022-03-02 RX ADMIN — INSULIN LISPRO 4 UNITS: 100 INJECTION, SOLUTION INTRAVENOUS; SUBCUTANEOUS at 12:15

## 2022-03-02 RX ADMIN — INSULIN LISPRO 3 UNITS: 100 INJECTION, SOLUTION INTRAVENOUS; SUBCUTANEOUS at 12:15

## 2022-03-02 RX ADMIN — APIXABAN 2.5 MG: 2.5 TABLET, FILM COATED ORAL at 21:15

## 2022-03-02 RX ADMIN — FAMOTIDINE 20 MG: 20 TABLET ORAL at 21:23

## 2022-03-02 RX ADMIN — SODIUM CHLORIDE 75 ML/HR: 9 INJECTION, SOLUTION INTRAVENOUS at 12:16

## 2022-03-02 RX ADMIN — INSULIN LISPRO 3 UNITS: 100 INJECTION, SOLUTION INTRAVENOUS; SUBCUTANEOUS at 17:30

## 2022-03-02 RX ADMIN — TAMSULOSIN HYDROCHLORIDE 0.4 MG: 0.4 CAPSULE ORAL at 21:15

## 2022-03-02 RX ADMIN — APIXABAN 2.5 MG: 2.5 TABLET, FILM COATED ORAL at 08:57

## 2022-03-02 RX ADMIN — ATORVASTATIN CALCIUM 10 MG: 10 TABLET, FILM COATED ORAL at 21:15

## 2022-03-02 RX ADMIN — LEVOTHYROXINE SODIUM 75 MCG: 0.07 TABLET ORAL at 08:57

## 2022-03-02 NOTE — CONSULTS
IDENTIFYING INFORMATION: The patient is a 66-year-old white male admitted with generalized weakness following a fall at home.  He is seen by psychiatry related to some possible depression.    CHIEF COMPLAINT: None given    INFORMANT: Patient and chart    RELIABILITY: Good    HISTORY OF PRESENT ILLNESS: The patient is a 66-year-old white male admitted with generalized weakness and a recent fall at home.  He has multiple medical problems including diabetes, hypertension, hyperlipidemia, chronic renal disease, history of CVA, hypothyroidism, COPD, chronic ischemic heart disease, systolic and diastolic congestive heart failure, hypertension, hyperlipidemia.  He is seen by psychiatry related to some issues of depression.  The patient reports that he has multiple stressors in his life apart from his health.  He is the father of a 36-year-old special needs child for whom he is responsible for care.  The patient denies current suicidal or homicidal ideation and is not endorsing depressive thinking at this time.  He does not wish to change antidepressant medication at this point, but does complain of poor sleep and would like something to help with that issue.  He reports a history of treatment with Zoloft several years ago but reports that it did little.  He is now on low-dose Wellbutrin XL.  He  reports a history of an allergic reaction to Prozac but does not elaborate.  He denies abuse of any psychoactive substances.  He reports that he is frustrated with his health situation.    PAST PSYCHIATRIC HISTORY: As above    PAST MEDICAL HISTORY: As above    MEDICATIONS:   Current Facility-Administered Medications   Medication Dose Route Frequency Provider Last Rate Last Admin   • apixaban (ELIQUIS) tablet 2.5 mg  2.5 mg Oral Q12H Josue Pickens MD   2.5 mg at 03/02/22 0857   • aspirin EC tablet 81 mg  81 mg Oral Daily Josue Pickens MD   81 mg at 03/02/22 0857   • atorvastatin (LIPITOR) tablet 20 mg  20 mg Oral Nightly Nelia  MD Josue   20 mg at 03/01/22 2059   • buPROPion XL (WELLBUTRIN XL) 24 hr tablet 150 mg  150 mg Oral QAM Josue Pickens MD   150 mg at 03/02/22 0857   • carvedilol (COREG) tablet 3.125 mg  3.125 mg Oral BID With Meals Josue Pickens MD   3.125 mg at 03/01/22 1824   • cholecalciferol (VITAMIN D3) tablet 5,000 Units  5,000 Units Oral Daily Josue Pickens MD   5,000 Units at 03/02/22 0857   • famotidine (PEPCID) tablet 20 mg  20 mg Oral BID Josue Pickens MD   20 mg at 03/02/22 0857   • insulin glargine (LANTUS, SEMGLEE) injection 10 Units  10 Units Subcutaneous Nightly Josue Pickens MD   10 Units at 03/01/22 2059   • insulin lispro (ADMELOG) injection 0-7 Units  0-7 Units Subcutaneous 4x Daily With Meals & Nightly Josue Pickens MD   4 Units at 03/02/22 1215   • insulin lispro (ADMELOG) injection 3 Units  3 Units Subcutaneous TID AC Josue Pickens MD   3 Units at 03/02/22 1215   • levothyroxine (SYNTHROID, LEVOTHROID) tablet 75 mcg  75 mcg Oral Daily Josue Pickens MD   75 mcg at 03/02/22 0857   • sodium chloride 0.9 % flush 10 mL  10 mL Intravenous PRN Sergio Burgess MD       • sodium chloride 0.9 % infusion  75 mL/hr Intravenous Continuous Josue Pickens MD 75 mL/hr at 03/02/22 1216 75 mL/hr at 03/02/22 1216   • tamsulosin (FLOMAX) 24 hr capsule 0.4 mg  0.4 mg Oral Nightly Josue Pickens MD   0.4 mg at 03/01/22 2059         ALLERGIES: Fluoxetine    FAMILY HISTORY: Noncontributory    SOCIAL HISTORY: Patient lives with his 36-year-old son.  He is retired .  He denies use of any psychoactive substances.    MENTAL STATUS EXAM: The patient is well-developed well-nourished white male appearing his stated age.  He is in no apparent physical distress at the time examination.  He is awake alert and oriented all spheres.  His mood is mildly dysphoric his affect constricted.  Speech is relevant and coherent.  There are no deficits in memory or cognition noted.  Intelligence is judged to be in the average range  based on fund of knowledge, the patient is cooperative Athen review.  He is currently reporting no suicidal homicidal ideations psychotic features.  His judgment and insight are intact.      ASSETS/LIABILITIES: Motivation for change, high level of functioning, multiple health and family issues    DIAGNOSTIC IMPRESSION: Major depressive disorder in partial remission, medical problems described previously    PLAN: At this point the patient does not wish to change his antidepressant medication.  I will add as needed zolpidem 5 mg at at bedtime to address his complaints of poor sleep and will continue to follow.

## 2022-03-02 NOTE — PLAN OF CARE
Goal Outcome Evaluation:  Plan of Care Reviewed With: patient        Progress: no change  Outcome Summary: No c/o pain or soa, just states he is weak and tired. Orthostatics still positive, but better then when prior shift did them. Up to BSC with PT. Continues on IVF. Henrik VELAZQUEZ added by psych.

## 2022-03-02 NOTE — NURSING NOTE
" Access f/u d/t depression. His affect is flat, speech slowed, little eye contact made. When asked if he had depression today, he states \"a little.\" He states he is tired, he just finished working with PT. He denies SI. When asked source of depression, he cites medical status. He states he ate all of his breakfast. Resources for therapy were provided at this time, he currently has no questions. He states his wife is able to transport to appointments but \"our son takes a lot of her time.\" He is encouraged to call and schedule appointment, as it could be several months out until he is seen, and encouraged telehealth could be an option for him with these providers. He is agreeable. Access will follow.  "

## 2022-03-02 NOTE — PLAN OF CARE
Goal Outcome Evaluation:    Patient is a pleasant 66 y.o. male admitted to [facility] for generalized weakness and elevated troponin on 3/1/2022. PMHx includes CVA resulting in L sided weakness. Patient is ambulatory at baseline and lives at home with family. Today, patient performed bed mobility with SBA and required Shakeel for transfers. Various BP readings during evaluation due to orthostatic hypoTN- please see OT eval and flowsheet for more details. Pending progress and medical, patient may benefit from skilled PT services acutely to address functional deficits as well as improve level of independence prior to discharge.    Patient was not wearing a face mask during this therapy encounter. Therapist used appropriate personal protective equipment including mask and gloves.  Mask used was standard procedure mask. Appropriate PPE was worn during the entire therapy session. Hand hygiene was completed before and after therapy session. Patient is not in enhanced droplet precautions.

## 2022-03-02 NOTE — PROGRESS NOTES
[facility] Progress Note      Patient Name: Carlito Mo  : 1955  MRN: [id number]  Primary Care Physician:  Florencia Caballero MD  Date of admission: 3/1/2022    Subjective     Interval History:   Follow up SYLVAIN on CKD IV. Feels better, but worn out. Eating. Urinating. Feels need to have BM .  Not soa.   Gave bolus NS this am for orthostatic BP.     Review of Systems:   As noted above    Objective     Vitals:   Temp:  [97.2 °F (36.2 °C)-98 °F (36.7 °C)] 97.8 °F (36.6 °C)  Heart Rate:  [54-66] 54  Resp:  [16-18] 18  BP: ()/(47-98) 136/73    Intake/Output Summary (Last 24 hours) at 3/2/2022 1034  Last data filed at 3/2/2022 0906  Gross per 24 hour   Intake 690 ml   Output 1420 ml   Net -730 ml       Physical Exam:    General Appearance: alert, oriented x 3, no acute distress . Chronically ill.   Skin: warm and dry  HEENT: oral mucosa normal, nonicteric sclera  Neck: supple, no JVD  Lungs: Clear to auscultation   Heart: RRR, normal S1 and S2. No rub .  Abdomen: soft, nontender, non-distended. + bs  Extremities: no edema.  Neuro: normal speech and mental status     Scheduled Meds:     apixaban, 2.5 mg, Oral, Q12H  aspirin, 81 mg, Oral, Daily  atorvastatin, 20 mg, Oral, Nightly  buPROPion XL, 150 mg, Oral, QAM  carvedilol, 3.125 mg, Oral, BID With Meals  cholecalciferol, 5,000 Units, Oral, Daily  famotidine, 20 mg, Oral, BID  insulin glargine, 10 Units, Subcutaneous, Nightly  insulin lispro, 0-7 Units, Subcutaneous, 4x Daily With Meals & Nightly  insulin lispro, 3 Units, Subcutaneous, TID AC  levothyroxine, 75 mcg, Oral, Daily  tamsulosin, 0.4 mg, Oral, Nightly      IV Meds:   sodium chloride, 75 mL/hr, Last Rate: 75 mL/hr (22 5484)        Results Reviewed:   I have personally reviewed the results from the time of this admission to 3/2/2022 10:34 EST     Results from last 7 days   Lab Units 22  0622 22  0402   SODIUM mmol/L 140 138   POTASSIUM mmol/L 3.9 4.1    CHLORIDE mmol/L 107 105   CO2 mmol/L 20.6* 18.7*   BUN mg/dL 68* 68*   CREATININE mg/dL 3.88* 4.31*   CALCIUM mg/dL 9.2 8.8   BILIRUBIN mg/dL 0.2 0.2   ALK PHOS U/L 89 97   ALT (SGPT) U/L 49* 60*   AST (SGOT) U/L 22 33   GLUCOSE mg/dL 81 242*       Estimated Creatinine Clearance: 19.8 mL/min (A) (by C-G formula based on SCr of 3.88 mg/dL (H)).    Results from last 7 days   Lab Units 03/02/22  0622   MAGNESIUM mg/dL 2.0       Results from last 7 days   Lab Units 03/02/22  0622   URIC ACID mg/dL 9.3*       Results from last 7 days   Lab Units 03/02/22  0622 03/01/22  0402   WBC 10*3/mm3 6.68 8.30   HEMOGLOBIN g/dL 11.5* 11.9*   PLATELETS 10*3/mm3 250 240       Results from last 7 days   Lab Units 03/01/22  0402   INR  1.07       Assessment / Plan     ASSESSMENT:  1.  SYLVAIN on CKD IV> Baseline crt in 3's.  Volume depletion due to diuresis. Improved with IVF. Continue IVF today.   2. Chronic combined systolic and diastolic heart failure. Compensated.  3. DM2  4. History of CVA  5. CAD sp CABG.   PLAN:  1. Continue IVF.       Zahida Hsu MD  03/02/22  10:34 EST    [facility]  [phone]              Detail Level: Detailed Quality 47: Advance Care Plan: Advance Care Planning discussed and documented in the medical record; patient did not wish or was not able to name a surrogate decision maker or provide an advance care plan. Quality 226: Preventive Care And Screening: Tobacco Use: Screening And Cessation Intervention: Patient screened for tobacco and never smoked Quality 431: Preventive Care And Screening: Unhealthy Alcohol Use - Screening: Unhealthy alcohol use screening not performed, reason not otherwise specified Quality 111:Pneumonia Vaccination Status For Older Adults: Pneumococcal Vaccination not Administered or Previously Received, Reason not Otherwise Specified Quality 110: Preventive Care And Screening: Influenza Immunization: Influenza Immunization Ordered or Recommended, but not Administered

## 2022-03-02 NOTE — DISCHARGE PLACEMENT REQUEST
"Carlito Mo (66 y.o. Male)             Date of Birth Social Security Number Address Home Phone MRN    1955  [id number] [id number] [phone] [id number]    Protestant Marital Status             Alevism        Admission Date Admission Type Admitting Provider Attending Provider Department, Room/Bed    3/1/22 Emergency Josue Pickens MD Ahmed, Aftab, MD [address], [address]    Discharge Date Discharge Disposition Discharge Destination                         Attending Provider: Josue Pickens MD    Allergies: Prozac [Fluoxetine Hcl]    Isolation: None   Infection: None   Code Status: CPR   Advance Care Planning Activity    Ht: 182.9 cm (72\")   Wt: 74.9 kg (165 lb 2 oz)    Admission Cmt: None   Principal Problem: Generalized weakness [R53.1]                 Active Insurance as of 3/1/2022     Primary Coverage     Payor Plan Insurance Group Employer/Plan Group    MEDICARE MEDICARE A & B      Payor Plan Address Payor Plan Phone Number Payor Plan Fax Number Effective Dates    [address] [phone]  5/1/2020 - None Entered    [facility] [id number]       Subscriber Name Subscriber Birth Date Member ID       CARLITO [place] 1955 [id number]           Secondary Coverage     Payor Plan Insurance Group Employer/Plan Group    [place]      Payor Plan Address Payor Plan Phone Number Payor Plan Fax Number Effective Dates    [address] [phone]  2/1/2022 - None Entered    [place] [address]       Subscriber Name Subscriber Birth Date Member ID       CARLITO [place] 1955 [id number]                 Emergency Contacts      (Rel.) Home Phone Work Phone Mobile Phone    Lissette Mo (Spouse) [phone] -- --            {Outbreak/Travel/Exposure Documentation......;  Question Available Choices Patient Response   COVID-19 Outbreak Screen:  Do you currently have a new onset of the following symptoms?        Fever/Chills, Cough, Shortness " of air, Loss of taste or smell, No, Unknown  (!) Shortness of Air (03/01/22 0818)   COVID-19 Outbreak Screen: In the last 14 days, have you had contact with anyone who is ill, has show any of the symptoms listed above and/or has been diagnosis with the 2019 Novel Coronavirus? This includes any immediate household members but excludes any patients with whom you have been in contact within your normal work duties wearing proper PPE, if you are a healthcare worker.  Yes, No, Unknown              No (03/01/22 0818)   COVID-19 Outbreak Screen: Who was notified? Free text neg (03/01/22 0818)   Ebola Screening Outbreak Screen: Have you traveled to the [place] or [place] within the past 21 days?  Yes, No, Unknown (not recorded)   Ebola Screening Outbreak Screen: Do you have ANY of the following symptoms: Fever/Chills, Vomiting, Diarrhea, Fatigue, Headache, Muscle pain, Unexplained bleeding, Abdominal (stomach) pain, No, Unknown (not recorded)   Ebola Screening Outbreak Screen: Name of Person notified Free text (not recorded)   Travel Screen: Have you traveled in the last month? If so, to what country have you traveled? If [place] what state? Yes, No, Unknown  List of all countries  List of all States No (03/01/22 0036)  (not recorded)  (not recorded)   Infection Risk: Do you currently have the following symptoms?  (If cough is selected, the Tuberculosis Screen is performed.) Cough, Fever, Rash, No No (03/01/22 0036)   Tuberculosis Screen: Do you have any of the following Tuberculosis Risks?  · Have you lived or spent time with anyone who had or may have TB?  · Have you lived in or visited any of the following areas for more than one month: [place], [place], [place], [place] or [place], the [place] or [place]?  · Do you have HIV/AIDS?  · Have you lived in or worked in a nursing home, homeless shelter, correctional facility, or substance abuse treatment facility?   · No    If Yes do you have any  of the following symptoms? Yes responses display to the right    If Yes, symptoms listed are:  Cough greater than or equal to 3 weeks, Loss of appetite, Unexplained weight loss, Night sweats, Bloody sputum or hemoptysis, Hoarseness, Fever, Fatigue, Chest pain, No (not recorded)  (not recorded)   Exposure Screen: Have you been exposed to any of these contagious diseases in the last month? Measles, Chickenpox, Meningitis, Pertussis, Whooping Cough, No No (03/01/22 0036)

## 2022-03-02 NOTE — PLAN OF CARE
Goal Outcome Evaluation:  Plan of Care Reviewed With: patient        Progress: no change  Outcome Summary: Patient continues to c/o weakness and tiredness tonight. IV fluids infusing. Fall precautions in place. New psych consult placed to see patient this AM. Orthostatic BPs continued. Will continue to monitor.

## 2022-03-02 NOTE — PROGRESS NOTES
"Daily progress note    Chief complaint  Doing same  No specific complaints  Still feeling very weak  Denies chest pain shortness of breath palpitation     History of present illness  66-year-old white male who is well-known to our service with history of diabetes hypertension hyperlipidemia and chronic kidney disease stage IV presented to [facility] emergency room with generalized weakness and he fell at home.  Patient denies any injury.  Patient does have dizziness lightheadedness but denies any chest pain increase shortness of breath abdominal pain nausea vomiting diarrhea.  Patient work-up in ER revealed acute kidney injury on top of chronic kidney disease stage IV admit for management.  Patient has chronic elevated troponin with no chest pain.     REVIEW OF SYSTEMS  Constitutional: Negative for activity change, appetite change and fever.   HENT: Negative for congestion and sore throat.    Eyes: Negative.    Respiratory: Negative for cough and shortness of breath.    Cardiovascular: Negative for chest pain and leg swelling.   Gastrointestinal: Negative for abdominal pain, diarrhea and vomiting.   Endocrine: Negative.    Genitourinary: Negative for decreased urine volume and dysuria.   Musculoskeletal: Negative for neck pain.   Skin: Negative for rash and wound.   Allergic/Immunologic: Negative.    Neurological: Positive for weakness. Negative for numbness and headaches.   Hematological: Negative.    Psychiatric/Behavioral: Negative.    All other systems reviewed and are negative.     PHYSICAL EXAM  Blood pressure 119/65, pulse 61, temperature 97.8 °F (36.6 °C), temperature source Oral, resp. rate 18, height 182.9 cm (72\"), weight 74.9 kg (165 lb 2 oz), SpO2 97 %.    Constitutional:       General: He is not in acute distress.  HENT:      Head: Normocephalic and atraumatic.   Eyes:      Pupils: Pupils are equal, round, and reactive to light.   Cardiovascular:      Rate and Rhythm: Normal rate and regular " rhythm.      Heart sounds: Normal heart sounds.   Pulmonary:      Effort: Pulmonary effort is normal. No respiratory distress.      Breath sounds: Normal breath sounds.   Abdominal:      Palpations: Abdomen is soft.      Tenderness: There is no abdominal tenderness. There is no guarding or rebound.   Musculoskeletal:         General: Normal range of motion.      Cervical back: Normal range of motion and neck supple.   Skin:     General: Skin is warm and dry.   Neurological:      Mental Status: He is alert and oriented to person, place, and time.      Sensory: Sensation is intact.   Psychiatric:         Mood and Affect: Mood and affect normal.      LAB RESULTS  Lab Results (last 24 hours)     Procedure Component Value Units Date/Time    POC Glucose Once [[id number]]  (Abnormal) Collected: 03/02/22 1150    Specimen: Blood Updated: 03/02/22 1153     Glucose 280 mg/dL      Comment: Meter: [id number] : [id number] Frandy RAMIREZ       Troponin [[id number]]  (Abnormal) Collected: 03/02/22 0622    Specimen: Blood Updated: 03/02/22 0745     Troponin T 0.113 ng/mL     Narrative:      Troponin T Reference Range:  <= 0.03 ng/mL-   Negative for AMI  >0.03 ng/mL-     Abnormal for myocardial necrosis.  Clinicians would have to utilize clinical acumen, EKG, Troponin and serial changes to determine if it is an Acute Myocardial Infarction or myocardial injury due to an underlying chronic condition.       Results may be falsely decreased if patient taking Biotin.      BNP [[id number]]  (Abnormal) Collected: 03/02/22 0622    Specimen: Blood Updated: 03/02/22 0741     proBNP 3,051.0 pg/mL     Narrative:      Among patients with dyspnea, NT-proBNP is highly sensitive for the detection of acute congestive heart failure. In addition NT-proBNP of <300 pg/ml effectively rules out acute congestive heart failure with 99% negative predictive value.    Results may be falsely decreased if patient taking Biotin.      TSH [[id number]]  (Normal)  Collected: 03/02/22 0622    Specimen: Blood Updated: 03/02/22 0741     TSH 1.200 uIU/mL     Uric Acid [[id number]]  (Abnormal) Collected: 03/02/22 0622    Specimen: Blood Updated: 03/02/22 0740     Uric Acid 9.3 mg/dL     Comprehensive Metabolic Panel [[id number]]  (Abnormal) Collected: 03/02/22 0622    Specimen: Blood Updated: 03/02/22 0740     Glucose 81 mg/dL      BUN 68 mg/dL      Creatinine 3.88 mg/dL      Sodium 140 mmol/L      Potassium 3.9 mmol/L      Chloride 107 mmol/L      CO2 20.6 mmol/L      Calcium 9.2 mg/dL      Total Protein 5.9 g/dL      Albumin 3.30 g/dL      ALT (SGPT) 49 U/L      AST (SGOT) 22 U/L      Alkaline Phosphatase 89 U/L      Total Bilirubin 0.2 mg/dL      Globulin 2.6 gm/dL      A/G Ratio 1.3 g/dL      BUN/Creatinine Ratio 17.5     Anion Gap 12.4 mmol/L      eGFR 16.3 mL/min/1.73      Comment: National Kidney Foundation and American Society of Nephrology (ASN) Task Force recommended calculation based on the Chronic Kidney Disease Epidemiology Collaboration (CKD-EPI) equation refit without adjustment for race.       Narrative:      GFR Normal >60  Chronic Kidney Disease <60  Kidney Failure <15      Lipid Panel [[id number]]  (Abnormal) Collected: 03/02/22 0622    Specimen: Blood Updated: 03/02/22 0740     Total Cholesterol 187 mg/dL      Triglycerides 59 mg/dL      HDL Cholesterol 47 mg/dL      LDL Cholesterol  129 mg/dL      VLDL Cholesterol 11 mg/dL      LDL/HDL Ratio 2.73    Narrative:      Cholesterol Reference Ranges  (U.S. Department of Health and Human Services ATP III Classifications)    Desirable          <200 mg/dL  Borderline High    200-239 mg/dL  High Risk          >240 mg/dL      Triglyceride Reference Ranges  (U.S. Department of Health and Human Services ATP III Classifications)    Normal           <150 mg/dL  Borderline High  150-199 mg/dL  High             200-499 mg/dL  Very High        >500 mg/dL    HDL Reference Ranges  (U.S. Department of Health and Human Services ATP  III Classifications)    Low     <40 mg/dl (major risk factor for CHD)  High    >60 mg/dl ('negative' risk factor for CHD)        LDL Reference Ranges  (U.S. Department of Health and Human Services ATP III Classifications)    Optimal          <100 mg/dL  Near Optimal     100-129 mg/dL  Borderline High  130-159 mg/dL  High             160-189 mg/dL  Very High        >189 mg/dL    Magnesium [[id number]]  (Normal) Collected: 03/02/22 0622    Specimen: Blood Updated: 03/02/22 0740     Magnesium 2.0 mg/dL     Hemoglobin A1c [[id number]]  (Abnormal) Collected: 03/02/22 0622    Specimen: Blood Updated: 03/02/22 0733     Hemoglobin A1C 9.40 %     Narrative:      Hemoglobin A1C Ranges:    Increased Risk for Diabetes  5.7% to 6.4%  Diabetes                     >= 6.5%  Diabetic Goal                < 7.0%    CBC & Differential [[id number]]  (Abnormal) Collected: 03/02/22 0622    Specimen: Blood Updated: 03/02/22 0712    Narrative:      The following orders were created for panel order CBC & Differential.  Procedure                               Abnormality         Status                     ---------                               -----------         ------                     CBC Auto Differential[[id number]]        Abnormal            Final result                 Please view results for these tests on the individual orders.    CBC Auto Differential [[id number]]  (Abnormal) Collected: 03/02/22 0622    Specimen: Blood Updated: 03/02/22 0712     WBC 6.68 10*3/mm3      RBC 4.30 10*6/mm3      Hemoglobin 11.5 g/dL      Hematocrit 35.7 %      MCV 83.0 fL      MCH 26.7 pg      MCHC 32.2 g/dL      RDW 14.7 %      RDW-SD 44.3 fl      MPV 10.9 fL      Platelets 250 10*3/mm3      Neutrophil % 62.4 %      Lymphocyte % 24.6 %      Monocyte % 9.4 %      Eosinophil % 2.8 %      Basophil % 0.4 %      Immature Grans % 0.4 %      Neutrophils, Absolute 4.16 10*3/mm3      Lymphocytes, Absolute 1.64 10*3/mm3      Monocytes, Absolute 0.63 10*3/mm3       Eosinophils, Absolute 0.19 10*3/mm3      Basophils, Absolute 0.03 10*3/mm3      Immature Grans, Absolute 0.03 10*3/mm3      nRBC 0.0 /100 WBC     POC Glucose Once [[id number]]  (Normal) Collected: 03/02/22 0614    Specimen: Blood Updated: 03/02/22 0615     Glucose 79 mg/dL      Comment: Meter: [id number] : [id number] Migue Lily NA       POC Glucose Once [[id number]]  (Abnormal) Collected: 03/01/22 2022    Specimen: Blood Updated: 03/01/22 2023     Glucose 275 mg/dL      Comment: Meter: [id number] : [id number] Migue Lily NA       POC Glucose Once [[id number]]  (Abnormal) Collected: 03/01/22 1658    Specimen: Blood Updated: 03/01/22 1700     Glucose 229 mg/dL      Comment: Meter: [id number] : [id number] Silvano RAMIREZ           Imaging Results (Last 24 Hours)     ** No results found for the last 24 hours. **             ECG 12 Lead  Component   Ref Range & Units 3/1/22 0256 2/3/22 0604 2/2/22 0342 2/1/22 1535 12/15/21 1248 5/20/21 2206   QT Interval   ms 482 P  488  482  491  511  469              HEART RATE= 61  bpm  RR Interval= 988  ms  NE Interval= 68  ms  P Horizontal Axis= 2  deg  P Front Axis= -55  deg  QRSD Interval= 172  ms  QT Interval= 482  ms  QRS Axis= -86  deg  T Wave Axis= 86  deg  - ABNORMAL ECG -  Sinus or ectopic atrial rhythm  Short NE interval  Right bundle branch block  Anteroseptal infarct, age indeterminate             Current Facility-Administered Medications:   •  apixaban (ELIQUIS) tablet 2.5 mg, 2.5 mg, Oral, Q12H, Josue Pickens MD, 2.5 mg at 03/02/22 0857  •  aspirin EC tablet 81 mg, 81 mg, Oral, Daily, Josue Pickens MD, 81 mg at 03/02/22 0857  •  atorvastatin (LIPITOR) tablet 20 mg, 20 mg, Oral, Nightly, Josue Pickens MD, 20 mg at 03/01/22 2059  •  buPROPion XL (WELLBUTRIN XL) 24 hr tablet 150 mg, 150 mg, Oral, QAM, Josue Pickens MD, 150 mg at 03/02/22 0830  •  carvedilol (COREG) tablet 3.125 mg, 3.125 mg, Oral, BID With Meals, Josue Pickens MD, 3.125 mg at 03/01/22 1824  •   cholecalciferol (VITAMIN D3) tablet 5,000 Units, 5,000 Units, Oral, Daily, Ora Pickens MD, 5,000 Units at 03/02/22 0857  •  famotidine (PEPCID) tablet 20 mg, 20 mg, Oral, BID, Ora Pickens MD, 20 mg at 03/02/22 0857  •  insulin glargine (LANTUS, SEMGLEE) injection 10 Units, 10 Units, Subcutaneous, Nightly, Ora Pickens MD, 10 Units at 03/01/22 2059  •  insulin lispro (ADMELOG) injection 0-7 Units, 0-7 Units, Subcutaneous, 4x Daily With Meals & Nightly, Ora Pickens MD, 4 Units at 03/02/22 1215  •  insulin lispro (ADMELOG) injection 3 Units, 3 Units, Subcutaneous, TID AC, Ora Pickens MD, 3 Units at 03/02/22 1215  •  levothyroxine (SYNTHROID, LEVOTHROID) tablet 75 mcg, 75 mcg, Oral, Daily, Ora Pickens MD, 75 mcg at 03/02/22 0857  •  [COMPLETED] Insert peripheral IV, , , Once **AND** sodium chloride 0.9 % flush 10 mL, 10 mL, Intravenous, PRN, Sergio Burgess MD  •  sodium chloride 0.9 % infusion, 75 mL/hr, Intravenous, Continuous, Ora Pickens MD, Last Rate: 75 mL/hr at 03/02/22 1216, 75 mL/hr at 03/02/22 1216  •  tamsulosin (FLOMAX) 24 hr capsule 0.4 mg, 0.4 mg, Oral, Nightly, Ora Pickens MD, 0.4 mg at 03/01/22 2059  •  zolpidem (AMBIEN) tablet 5 mg, 5 mg, Oral, Nightly PRN, Bob Villalobos III, MD    ASSESSMENT  Acute kidney injury   Chronic kidney disease stage IV  Elevated troponin with no chest pain  Diabetes mellitus  Hypertension with low BP  Hyperlipidemia  Gastroesophageal reflux disease    PLAN  CPM  IVF  Continue to hold diuretics   Nephrology consult appreciated  Cardiology and psychiatry to follow patient  Adjust home medications  Stress ulcer DVT prophylaxis  Supportive care   PT/OT  Discussed with nursing staff   Follow closely and further recommendation current hospital course    ORA PICKENS MD

## 2022-03-02 NOTE — THERAPY EVALUATION
Patient Name: Carlito Mo  : 1955    MRN: [id number]                              Today's Date: 3/2/2022       Admit Date: 3/1/2022    Visit Dx:     ICD-10-CM ICD-9-CM   1. Generalized weakness  R53.1 780.79   2. Acute on chronic renal insufficiency  N28.9 593.9    N18.9 585.9   3. Fall, initial encounter  W19.XXXA E888.9   4. Elevated troponin  R77.8 790.6     Patient Active Problem List   Diagnosis   • Major depressive disorder with single episode, in partial remission ([facility])   • Other hyperlipidemia   • Vitamin D deficiency   • Erectile dysfunction   • Chronic fatigue   • Type 2 diabetes mellitus with ophthalmic complication ([facility])   • Skin lesion of chest wall   • Slow transit constipation   • Benign prostatic hyperplasia with nocturia   • History of basal cell carcinoma   • High blood pressure associated with diabetes ([facility])   • Acquired hypothyroidism   • Hypertensive urgency   • Recurrent strokes ([facility])   • Urinary retention   • Pneumonia   • Acute on chronic combined systolic and diastolic congestive heart failure ([facility])   • Acute respiratory failure with hypoxia ([facility])   • Suspected sleep apnea   • Pleural effusion   • YAW (obstructive sleep apnea)   • Coronary artery disease involving native coronary artery of native heart without angina pectoris   • Chronically elevated troponin   • Colon cancer screening   • Enlarged lymph nodes   • Functional gait abnormality   • History of myocardial infarction   • Hospital discharge follow-up   • Insomnia   • Iron deficiency anemia   • Major depression, recurrent ([facility])   • Anemia, chronic renal failure, stage 3 (moderate) ([facility])   • Essential hypertension   • Adverse effect of iron and its compounds, initial encounter   • Acute on chronic renal insufficiency   • Debility   • Falls frequently   • Acute pain of right shoulder   • Acute on chronic anemia   • Heme positive stool   • Neurogenic orthostatic hypotension ([facility])   • Symptomatic anemia   • History of colon  polyps   • Helicobacter pylori gastritis   • Esophagitis   • Sleep related hypoxia   • Embolic stroke (HCC)   • Patent foramen ovale   • Pleural effusion, bilateral   • Cardiomyopathy (HCC)   • Lower abdominal pain   • Diarrhea   • CKD (chronic kidney disease) stage 4, GFR 15-29 ml/min (HCC)   • Hypertension not at goal   • Memory loss due to medical condition   • Generalized weakness     Past Medical History:   Diagnosis Date   • Acquired hypothyroidism    • Acute congestive heart failure (HCC)    • Acute respiratory failure with hypoxia (HCC)    • Acute sinusitis    • SYLVAIN (acute kidney injury) (HCC)     on CKD   • Anemia    • Arthritis    • CAD (coronary artery disease)    • Cancer (HCC)     skin   • Chronic combined systolic and diastolic congestive heart failure (HCC)    • Chronic ischemic heart disease    • CKD (chronic kidney disease)    • COPD (chronic obstructive pulmonary disease) (HCC)    • Depression    • Diabetes mellitus type 2, uncontrolled, without complications    • Disease of thyroid gland    • Elevated cholesterol    • Fatigue    • Frequent PVCs    • GERD (gastroesophageal reflux disease)    • Heart attack (HCC)    • History of coronary artery disease     with remote history of bypass surgery in 2001   • History of transfusion    • Hyperglycemia    • Hyperlipidemia    • Hypertension    • Hypertensive encephalopathy    • Mental status change     Acute   • YAW on CPAP    • Pleural effusion    • Pneumonia    • RBBB (right bundle branch block)    • Screening for prostate cancer 2011   • Stroke (HCC)    • TIA (transient ischemic attack)    • Type 2 diabetes mellitus (HCC)    • Urinary retention    • Vitamin D deficiency      Past Surgical History:   Procedure Laterality Date   • APPENDECTOMY N/A 02/14/2016    Dr. Alexey Dodson   • COLONOSCOPY      over 20 years ago.  no polyps    • COLONOSCOPY N/A 9/18/2018    Procedure: COLONOSCOPY;  Surgeon: Jose Enrique Louie MD;  Location: [facility] ENDOSCOPY;   Service: Gastroenterology   • COLONOSCOPY N/A 9/19/2018    IH, EH, polyps (TAs w/low grade dysplasia)   • COLONOSCOPY N/A 6/1/2020    Procedure: COLONOSCOPY;  Surgeon: Jose Enrique Louie MD;  Location: [facility] ENDOSCOPY;  Service: Gastroenterology;  Laterality: N/A;  Pre op-Anemia, Melena, History of Polyps  Post op-To Cecum/TI, Polyp, Poor Prep   • CORONARY ARTERY BYPASS GRAFT  11/2001   • ENDOSCOPY N/A 5/29/2020    Procedure: ESOPHAGOGASTRODUODENOSCOPY with biopsies;  Surgeon: Amanda Lovelace MD;  Location: [facility] ENDOSCOPY;  Service: Gastroenterology;  Laterality: N/A;  pre-anemia, dark stools  post-esophagitis, hiatal hernia   • ENDOSCOPY N/A 9/15/2020    Procedure: ESOPHAGOGASTRODUODENOSCOPY WITH BIOPSIES;  Surgeon: Jose Enrique Louie MD;  Location: [facility] ENDOSCOPY;  Service: Gastroenterology;  Laterality: N/A;  pre: history of melena and esophagitis  post: mild esophagitis and gastritis, small hiatal hernia   • HERNIA REPAIR Left 12/05/2019   • TONSILLECTOMY     • VASECTOMY        General Information     Row Name 03/02/22 1508          Physical Therapy Time and Intention    Document Type evaluation  -MS     Mode of Treatment occupational therapy; co-treatment; physical therapy  -MS     Row Name 03/02/22 1508          General Information    Patient Profile Reviewed yes  -MS     Existing Precautions/Restrictions fall; other (see comments)  -MS     Barriers to Rehab medically complex; previous functional deficit  -MS     Row Name 03/02/22 1508          Living Environment    Lives With spouse; child(carlos), adult  -MS     Row Name 03/02/22 1508          Cognition    Orientation Status (Cognition) oriented x 3  -MS     Row Name 03/02/22 1508          Safety Issues, Functional Mobility    Impairments Affecting Function (Mobility) balance; endurance/activity tolerance; strength  -MS     Comment, Safety Issues/Impairments (Mobility) Gait belt and nonskid socks donned for safety.  -MS           User Key  (r) =  Recorded By, (t) = Taken By, (c) = Cosigned By    Initials Name Provider Type    MS Reina Jean-Baptiste, PT Physical Therapist               Mobility     Row Name 03/02/22 1509          Bed Mobility    Bed Mobility supine-sit; sit-supine  -MS     Supine-Sit Rowan (Bed Mobility) standby assist  -MS     Sit-Supine Rowan (Bed Mobility) standby assist  -MS     Assistive Device (Bed Mobility) bed rails; head of bed elevated  -MS     Comment (Bed Mobility) SV-SBA for sitting balance. Report of lightheadedness- BP assessed in sitting.  -MS     Row Name 03/02/22 1509          Transfers    Comment (Transfers) Sequencing and hand placement cues. Multiple STS performed today from bed and BSC. Lightheadedness reported once getting to BSC- encouraged LAQs and APs. BP assessed throughout.  -MS     Row Name 03/02/22 1509          Sit-Stand Transfer    Sit-Stand Rowan (Transfers) minimum assist (75% patient effort); contact guard  -MS     Assistive Device (Sit-Stand Transfers) walker, front-wheeled  -MS     Row Name 03/02/22 1509          Gait/Stairs (Locomotion)    Comment (Gait/Stairs) Did not attempt ambulation today due to orthostatic hypoTN.  -MS           User Key  (r) = Recorded By, (t) = Taken By, (c) = Cosigned By    Initials Name Provider Type    MS Jean-BaptisteReina, PT Physical Therapist               Obj/Interventions     Row Name 03/02/22 1511          Range of Motion Comprehensive    General Range of Motion no range of motion deficits identified  -MS     Row Name 03/02/22 1511          Strength Comprehensive (MMT)    Comment, General Manual Muscle Testing (MMT) Assessment Generalized weakness, grossly at least 3/5  -MS     Row Name 03/02/22 1511          Balance    Static Sitting Balance sitting, edge of bed; WFL  -MS     Dynamic Sitting Balance sitting, edge of bed; WFL  -MS     Static Standing Balance mild impairment; supported; standing  -MS     Dynamic Standing Balance mild impairment;  supported; standing  -MS     Row Name 03/02/22 1511          Sensory Assessment (Somatosensory)    Sensory Assessment (Somatosensory) sensation intact  -MS           User Key  (r) = Recorded By, (t) = Taken By, (c) = Cosigned By    Initials Name Provider Type    Reina Pino, PT Physical Therapist               Goals/Plan     Row Name 03/02/22 1515          Bed Mobility Goal 1 (PT)    Activity/Assistive Device (Bed Mobility Goal 1, PT) bed mobility activities, all  -MS     [place] Level/Cues Needed (Bed Mobility Goal 1, PT) supervision required  -MS     Time Frame (Bed Mobility Goal 1, PT) 1 week  -MS     Row Name 03/02/22 1515          Transfer Goal 1 (PT)    Activity/Assistive Device (Transfer Goal 1, PT) transfers, all; walker, rolling  -MS     [place] Level/Cues Needed (Transfer Goal 1, PT) supervision required  -MS     Time Frame (Transfer Goal 1, PT) 1 week  -MS     Row Name 03/02/22 1515          Gait Training Goal 1 (PT)    Activity/Assistive Device (Gait Training Goal 1, PT) gait (walking locomotion); walker, rolling  -MS     [place] Level (Gait Training Goal 1, PT) supervision required  -MS     Distance (Gait Training Goal 1, PT) 150'  -MS     Time Frame (Gait Training Goal 1, PT) 1 week  -MS           User Key  (r) = Recorded By, (t) = Taken By, (c) = Cosigned By    Initials Name Provider Type    Reina Pino, PT Physical Therapist               Clinical Impression     Row Name 03/02/22 1514          Pain    Additional Documentation Pain Scale: Numbers Pre/Post-Treatment (Group)  -MS     Row Name 03/02/22 1514          Therapy Assessment/Plan (PT)    Criteria for Skilled Interventions Met (PT) yes; meets criteria  -MS     Row Name 03/02/22 1514          Positioning and Restraints    Pre-Treatment Position in bed  -MS     Post Treatment Position bed  -MS     In Bed notified nsg; call light within reach; encouraged to call for assist; exit alarm on  -MS           User Key   (r) = Recorded By, (t) = Taken By, (c) = Cosigned By    Initials Name Provider Type    Reina Pino KRISTYN, PT Physical Therapist               Outcome Measures     Row Name 03/02/22 1515 03/02/22 1248       Functional Assessment    Outcome Measure Options AM-PAC 6 Clicks Basic Mobility (PT)  -MS AM-PAC 6 Clicks Daily Activity (OT)  -HEENA          User Key  (r) = Recorded By, (t) = Taken By, (c) = Cosigned By    Initials Name Provider Type    Reina Pino, PT Physical Therapist    Parvin Marques, OT Occupational Therapist                             Physical Therapy Education                 Title: PT OT SLP Therapies (In Progress)     Topic: Physical Therapy (Not Started)     Point: Mobility training (Not Started)     Learner Progress:  Not documented in this visit.          Point: Home exercise program (Not Started)     Learner Progress:  Not documented in this visit.          Point: Body mechanics (Not Started)     Learner Progress:  Not documented in this visit.          Point: Precautions (Not Started)     Learner Progress:  Not documented in this visit.                          PT Recommendation and Plan  Planned Therapy Interventions (PT): balance training, bed mobility training, gait training, home exercise program, patient/family education, postural re-education, ROM (range of motion), stair training, strengthening, transfer training        Time Calculation:    PT Charges     Row Name 03/02/22 1507             Time Calculation    Start Time 1040  -MS      Stop Time 1100  -MS      Time Calculation (min) 20 min  -MS      PT Received On 03/02/22  -MS      PT - Next Appointment 03/03/22  -MS              Time Calculation- PT    Total Timed Code Minutes- PT 15 minute(s)  -MS            User Key  (r) = Recorded By, (t) = Taken By, (c) = Cosigned By    Initials Name Provider Type    Riena Pino, PT Physical Therapist              Therapy Charges for Today     Code Description Service  Date Service Provider Modifiers Qty    [id number]  PT EVAL MOD COMPLEXITY 2 3/2/2022 Reina Jean-Baptiste, PT GP 1    54622465979 HC PT THERAPEUTIC ACT EA 15 MIN 3/2/2022 Reina Jean-Baptiste, PT GP 1          PT G-Codes  Outcome Measure Options: AM-PAC 6 Clicks Basic Mobility (PT)  AM-PAC 6 Clicks Score (OT): 20    Reina Jean-Baptiste, PT  3/2/2022

## 2022-03-02 NOTE — PROGRESS NOTES
"   LOS: 1 day   Patient Care Team:  Florencia Caballero MD as PCP - General (Internal Medicine)  Amber Murguia MD as Consulting Physician (Cardiology)  [facility] as Pharmacist  Seth Ladd MD as Surgeon (General Surgery)  Kim Hoyos MD PhD as Consulting Physician (Hematology and Oncology)  Jerome Diallo MD as Referring Physician (Family Medicine)  Jose Enrique Louie MD as Consulting Physician (Gastroenterology)  Nima Huddleston MD as Consulting Physician (Nephrology)    Chief Complaint: elevated Tn     Interval History: No CP. + DE LEON but this is chronic. No edema or orthopnea. No palpitations. Constipated.       Objective   Vital Signs  Temp:  [97.2 °F (36.2 °C)-98 °F (36.7 °C)] 97.8 °F (36.6 °C)  Heart Rate:  [54-66] 54  Resp:  [16-18] 18  BP: ()/(47-98) 136/73    Intake/Output Summary (Last 24 hours) at 3/2/2022 1253  Last data filed at 3/2/2022 0906  Gross per 24 hour   Intake 690 ml   Output 1420 ml   Net -730 ml       Last Weight and Admission Weight        03/02/22  0512   Weight: 74.9 kg (165 lb 2 oz)     Flowsheet Rows      First Filed Value   Admission Height 182.9 cm (72\") Documented at 03/01/2022 0647   Admission Weight 74.8 kg (165 lb) Documented at 03/01/2022 0647                  Physical Exam  Vitals reviewed.   Constitutional:       Appearance: He is well-developed.   HENT:      Head: Normocephalic.      Nose: Nose normal.      Mouth/Throat:      Pharynx: Oropharynx is clear.   Eyes:      Conjunctiva/sclera: Conjunctivae normal.   Neck:      Vascular: No JVD.   Cardiovascular:      Rate and Rhythm: Normal rate and regular rhythm.      Heart sounds: Normal heart sounds.   Pulmonary:      Effort: Pulmonary effort is normal.      Breath sounds: Normal breath sounds.   Abdominal:      Palpations: Abdomen is soft.      Tenderness: There is no abdominal tenderness.   Musculoskeletal:         General: No swelling. Normal range of motion.      Cervical back: Normal " range of motion.   Skin:     General: Skin is warm and dry.      Findings: No erythema.   Neurological:      Mental Status: He is alert.   Psychiatric:         Mood and Affect: Mood normal.         Behavior: Behavior normal.         Thought Content: Thought content normal.         Results Review:      Results from last 7 days   Lab Units 03/02/22 0622 03/01/22 0402 03/01/22 0402   SODIUM mmol/L 140  --  138   POTASSIUM mmol/L 3.9  --  4.1   CHLORIDE mmol/L 107  --  105   CO2 mmol/L 20.6*  --  18.7*   BUN mg/dL 68*  --  68*   CREATININE mg/dL 3.88*  --  4.31*   GLUCOSE mg/dL 81   < > 242*   CALCIUM mg/dL 9.2  --  8.8    < > = values in this interval not displayed.     Results from last 7 days   Lab Units 03/02/22 0622 03/01/22 0402   TROPONIN T ng/mL 0.113* 0.126*     Results from last 7 days   Lab Units 03/02/22 0622 03/01/22 0402   WBC 10*3/mm3 6.68 8.30   HEMOGLOBIN g/dL 11.5* 11.9*   HEMATOCRIT % 35.7* 37.2*   PLATELETS 10*3/mm3 250 240     Results from last 7 days   Lab Units 03/01/22 0402   INR  1.07   APTT seconds 21.9*     Results from last 7 days   Lab Units 03/02/22  0622   CHOLESTEROL mg/dL 187     Results from last 7 days   Lab Units 03/02/22  0622   MAGNESIUM mg/dL 2.0     Results from last 7 days   Lab Units 03/02/22 0622   CHOLESTEROL mg/dL 187   TRIGLYCERIDES mg/dL 59   HDL CHOL mg/dL 47   LDL CHOL mg/dL 129*       I reviewed the patient's new clinical results.  I personally viewed and interpreted the patient's EKG/Telemetry data        Medication Review:   apixaban, 2.5 mg, Oral, Q12H  aspirin, 81 mg, Oral, Daily  atorvastatin, 20 mg, Oral, Nightly  buPROPion XL, 150 mg, Oral, QAM  carvedilol, 3.125 mg, Oral, BID With Meals  cholecalciferol, 5,000 Units, Oral, Daily  famotidine, 20 mg, Oral, BID  insulin glargine, 10 Units, Subcutaneous, Nightly  insulin lispro, 0-7 Units, Subcutaneous, 4x Daily With Meals & Nightly  insulin lispro, 3 Units, Subcutaneous, TID AC  levothyroxine, 75 mcg, Oral,  Daily  tamsulosin, 0.4 mg, Oral, Nightly        sodium chloride, 75 mL/hr, Last Rate: 75 mL/hr (03/02/22 1216)        Assessment/Plan     1. Generalized weakness/falls  2. Chronically elevated Tn  3. Chronic dCHF with preserved LVEF of 45-50% by recent echo  4. SYLVAIN on CKD4  5. DM  6. CAD    He was overdiuresed upon presentation. He presently appears euvolemic to me. His Tn elevation is chronic and unchanged. He denies angina.    No further cardiac testing is recommended at this time.     Christiano Badillo MD  03/02/22  12:53 EST

## 2022-03-02 NOTE — PLAN OF CARE
Problem: Adult Inpatient Plan of Care  Goal: Plan of Care Review  Outcome: Ongoing, Progressing  Flowsheets (Taken 3/1/2022 2003)  Progress: no change  Plan of Care Reviewed With: patient  Outcome Summary: Pt vital stable. No c/o pain. IV fluids. Labs in am. WIll continue to monitor   Goal Outcome Evaluation:  Plan of Care Reviewed With: patient        Progress: no change  Outcome Summary: Pt vital stable. No c/o pain. IV fluids. Labs in am. WIll continue to monitor

## 2022-03-02 NOTE — CASE MANAGEMENT/SOCIAL WORK
Discharge Planning Assessment  [facility]     Patient Name: Carlito Mo  MRN: [id number]  Today's Date: 3/2/2022    Admit Date: 3/1/2022     Discharge Needs Assessment     Row Name 03/02/22 1926       Living Environment    Lives With spouse; child(carlos), adult    Name(s) of Who Lives With Patient spouse Lissette Mo [phone]    Current Living Arrangements home/apartment/condo    Primary Care Provided by self; spouse/significant other    Provides Primary Care For no one, unable/limited ability to care for self    Family Caregiver if Needed spouse    Family Caregiver Names spouse Lissette Mo [phone]    Quality of Family Relationships helpful; involved; supportive    Able to Return to Prior Arrangements yes       Resource/Environmental Concerns    Resource/Environmental Concerns home accessibility    Home Accessibility Concerns stairs to enter home; stairs to access bedroom or bathroom  3 steps with no handrails to enter his multi-level home and there are 9 steps with 1 handrail to get to the bedrooms in his home and 4 steps with 2 handrails to get to the basement in the home.       Transition Planning    Patient/Family Anticipates Transition to home with family    Patient/Family Anticipated Services at Transition home health care    Transportation Anticipated family or friend will provide       Discharge Needs Assessment    Equipment Currently Used at Home cpap    Concerns to be Addressed discharge planning    Anticipated Changes Related to Illness none    Discharge Facility/Level of Care Needs home with home health    Current Discharge Risk physical impairment               Discharge Plan     Row Name 03/02/22 9365       Plan    Plan Plans home with family assistance and [facility].    Provided Post Acute Provider List? N/A    N/A Provider List Comment Referral requested for [facility].    Provided Post Acute Provider Quality & Resource List? N/A    N/A Quality & Resource List Comment Referral requested for [facility].     Patient/Family in Agreement with Plan yes    Plan Comments Met with the patient at bedside; explained role of CCP, verified facesheet, checked IMM and discussed discharge planning needs.  The patient plans to return home upon d/c with assistance from his spouse Lissette Mo [phone].  The patient has a cpap and states that he is no longer on home O2 with [facility]. The patient has 3 steps with no handrails to enter his multi-level home and there are 9 steps with 1 handrail to get to the bedrooms in his home and 4 steps with 2 handrails to get to the basement in the home. The patient’s pharmacy is [facility] on [address] and the patient states he has no trouble remembering to take his medication and states that his money gets tight when he has to purchase certain medication.  The patient is agreeable to using [facility]. The patient states that he has used [facility] in the past and would want to use them in the past- voicemail message left for [facility].  The patient denies any SNF history, was encouraged to bring his POA documents, states that his wife can transport him to appointments if needed and she can transport him home upon d/c.  Discussed O.T. recommendation for inpatient rehab vs. HH and the patient reconfirmed that he would want to use [facility].  The patient discussed having some financial problems and is agreeable to [facility] referrals being made for assistance with rent/mortgage, utilities, financial assistance and any available assistance with his adult Autistic son. CCP will follow for continued P.T. recommendations and any DME needs. CCP will follow to see if [facility] can accept the patient.  FAREED So              Continued Care and Services - Admitted Since 3/1/2022     Home Medical Care     Service Provider Request Status Selected Services Address Phone Fax Patient Preferred    [facility]  Pending - Request Sent N/A [address] [address] [phone]  --                 Demographic Summary     Row Name 03/02/22 1754       General Information    Admission Type inpatient    Arrived From home    Referral Source admission list    Reason for Consult discharge planning    Preferred Language English     Used During This Interaction no               Functional Status     Row Name 03/02/22 1754       Functional Status    Usual Activity Tolerance moderate    Current Activity Tolerance fair       Functional Status, IADL    Medications independent    Meal Preparation independent    Housekeeping independent    Laundry independent    Shopping independent       Mental Status    General Appearance WDL WDL       Mental Status Summary    Recent Changes in Mental Status/Cognitive Functioning no changes               Psychosocial    No documentation.                Abuse/Neglect    No documentation.                Legal    No documentation.                Substance Abuse    No documentation.                Patient Forms    No documentation.                   FAREED [place]

## 2022-03-02 NOTE — PLAN OF CARE
Goal Outcome Evaluation:  Plan of Care Reviewed With: patient           Outcome Summary: Pt is a 67 y/o M admit with generalized weakness and c/o multiple falls at home. W/u for acute kidney injury. Recent admission 2/3/22 for hypertensive emergency and heart failure exacerbation. PMH includes DM, HTN, HLD, CKD 4, cancer, CVA, CABG. Pt resides with his spouse and adult son with special needs in a tri-level home with multiple steps. Pt reports indep with ADLs and mobility at baseline. Orthostatic BPs taken throughout session as noted: Supine 152/78 (109). EOB Seated: 117/76 (97). Standing EOB: 102/58 (74). Pt assisted to BSC with min A x 1. BP taken again after BLEs exercises: 129/83 (105). Pt remains seated at commode for safety x 5 min, again BP taken: 116/84 (104). Pt returned to EOB, then supine with same technique and level of assist. BP in supine at end of session: 134/78 (106). Pt appears moderately below ADL baseline, limited by impaired endurance, generalized weakness, impaired balance, impaired strength and acuity of illness. OT recommends 5x/week and d/c to inpatient rehab vs home with HH pending progress and medical stability.        Patient was not wearing a face mask during this therapy encounter. Therapist used appropriate personal protective equipment including mask, gloves, and eye protection.  Mask used was standard procedure mask. Appropriate PPE was worn during the entire therapy session. Hand hygiene was completed before and after therapy session. Patient is not in enhanced droplet precautions.

## 2022-03-02 NOTE — THERAPY EVALUATION
Patient Name: Carlito Mo  : 1955    MRN: [id number]                              Today's Date: 3/2/2022       Admit Date: 3/1/2022    Visit Dx:     ICD-10-CM ICD-9-CM   1. Generalized weakness  R53.1 780.79   2. Acute on chronic renal insufficiency  N28.9 593.9    N18.9 585.9   3. Fall, initial encounter  W19.XXXA E888.9   4. Elevated troponin  R77.8 790.6     Patient Active Problem List   Diagnosis   • Major depressive disorder with single episode, in partial remission ([facility])   • Other hyperlipidemia   • Vitamin D deficiency   • Erectile dysfunction   • Chronic fatigue   • Type 2 diabetes mellitus with ophthalmic complication ([facility])   • Skin lesion of chest wall   • Slow transit constipation   • Benign prostatic hyperplasia with nocturia   • History of basal cell carcinoma   • High blood pressure associated with diabetes ([facility])   • Acquired hypothyroidism   • Hypertensive urgency   • Recurrent strokes ([facility])   • Urinary retention   • Pneumonia   • Acute on chronic combined systolic and diastolic congestive heart failure ([facility])   • Acute respiratory failure with hypoxia ([facility])   • Suspected sleep apnea   • Pleural effusion   • YAW (obstructive sleep apnea)   • Coronary artery disease involving native coronary artery of native heart without angina pectoris   • Chronically elevated troponin   • Colon cancer screening   • Enlarged lymph nodes   • Functional gait abnormality   • History of myocardial infarction   • Hospital discharge follow-up   • Insomnia   • Iron deficiency anemia   • Major depression, recurrent ([facility])   • Anemia, chronic renal failure, stage 3 (moderate) ([facility])   • Essential hypertension   • Adverse effect of iron and its compounds, initial encounter   • Acute on chronic renal insufficiency   • Debility   • Falls frequently   • Acute pain of right shoulder   • Acute on chronic anemia   • Heme positive stool   • Neurogenic orthostatic hypotension ([facility])   • Symptomatic anemia   • History of colon  polyps   • Helicobacter pylori gastritis   • Esophagitis   • Sleep related hypoxia   • Embolic stroke (HCC)   • Patent foramen ovale   • Pleural effusion, bilateral   • Cardiomyopathy (HCC)   • Lower abdominal pain   • Diarrhea   • CKD (chronic kidney disease) stage 4, GFR 15-29 ml/min (HCC)   • Hypertension not at goal   • Memory loss due to medical condition   • Generalized weakness     Past Medical History:   Diagnosis Date   • Acquired hypothyroidism    • Acute congestive heart failure (HCC)    • Acute respiratory failure with hypoxia (HCC)    • Acute sinusitis    • SYLVAIN (acute kidney injury) (HCC)     on CKD   • Anemia    • Arthritis    • CAD (coronary artery disease)    • Cancer (HCC)     skin   • Chronic combined systolic and diastolic congestive heart failure (HCC)    • Chronic ischemic heart disease    • CKD (chronic kidney disease)    • COPD (chronic obstructive pulmonary disease) (HCC)    • Depression    • Diabetes mellitus type 2, uncontrolled, without complications    • Disease of thyroid gland    • Elevated cholesterol    • Fatigue    • Frequent PVCs    • GERD (gastroesophageal reflux disease)    • Heart attack (HCC)    • History of coronary artery disease     with remote history of bypass surgery in 2001   • History of transfusion    • Hyperglycemia    • Hyperlipidemia    • Hypertension    • Hypertensive encephalopathy    • Mental status change     Acute   • YAW on CPAP    • Pleural effusion    • Pneumonia    • RBBB (right bundle branch block)    • Screening for prostate cancer 2011   • Stroke (HCC)    • TIA (transient ischemic attack)    • Type 2 diabetes mellitus (HCC)    • Urinary retention    • Vitamin D deficiency      Past Surgical History:   Procedure Laterality Date   • APPENDECTOMY N/A 02/14/2016    Dr. Alexey Dodson   • COLONOSCOPY      over 20 years ago.  no polyps    • COLONOSCOPY N/A 9/18/2018    Procedure: COLONOSCOPY;  Surgeon: Jose Enrique Louie MD;  Location: [facility];   Service: Gastroenterology   • COLONOSCOPY N/A 9/19/2018    IH, EH, polyps (TAs w/low grade dysplasia)   • COLONOSCOPY N/A 6/1/2020    Procedure: COLONOSCOPY;  Surgeon: Jose Enrique Louie MD;  Location:  [facility];  Service: Gastroenterology;  Laterality: N/A;  Pre op-Anemia, Melena, History of Polyps  Post op-To Cecum/TI, Polyp, Poor Prep   • CORONARY ARTERY BYPASS GRAFT  11/2001   • ENDOSCOPY N/A 5/29/2020    Procedure: ESOPHAGOGASTRODUODENOSCOPY with biopsies;  Surgeon: Amanda Lovelace MD;  Location:  [facility];  Service: Gastroenterology;  Laterality: N/A;  pre-anemia, dark stools  post-esophagitis, hiatal hernia   • ENDOSCOPY N/A 9/15/2020    Procedure: ESOPHAGOGASTRODUODENOSCOPY WITH BIOPSIES;  Surgeon: Jose Enrique Louie MD;  Location:  [facility];  Service: Gastroenterology;  Laterality: N/A;  pre: history of melena and esophagitis  post: mild esophagitis and gastritis, small hiatal hernia   • HERNIA REPAIR Left 12/05/2019   • TONSILLECTOMY     • VASECTOMY        General Information     Row Name 03/02/22 1239          OT Time and Intention    Document Type evaluation  -HEENA     Mode of Treatment occupational therapy; co-treatment; physical therapy  -HEENA     Row Name 03/02/22 1239          General Information    Patient Profile Reviewed yes  -HEENA     Prior Level of Function independent:; ADL's; all household mobility  -HEENA     Existing Precautions/Restrictions fall; other (see comments)  orthostatic BPs  -HEENA     Barriers to Rehab medically complex  -HEENA     Row Name 03/02/22 1239          Occupational Profile    Reason for Services/Referral (Occupational Profile) Pt is a 65 y/o M admit with generalized weakness and c/o multiple falls at home. W/u for acute kidney injury. Recent admission 2/3/22 for hypertensive emergency and heart failure exacerbation. PMH includes DM, HTN, HLD, CKD 4, cancer, CVA, CABG. Pt resides with his spouse and adult son with special needs in a tri-level home with  multiple steps. Pt reports indep with ADLs and mobility at baseline.  -     Row Name 03/02/22 1239          Living Environment    Lives With spouse; child(carlos), adult  -HEENA     Row Name 03/02/22 1239          Stairs Within Home, Primary    Number of Stairs, Within Home, Primary other (see comments)  -     Stairs Comment, Within Home, Primary multiple in a tri-level home  -HEENA     Row Name 03/02/22 1239          Cognition    Orientation Status (Cognition) oriented x 3  -HEENA     Row Name 03/02/22 1239          Safety Issues, Functional Mobility    Safety Issues Affecting Function (Mobility) safety precaution awareness; insight into deficits/self-awareness  -     Impairments Affecting Function (Mobility) balance; endurance/activity tolerance; strength  -     Comment, Safety Issues/Impairments (Mobility) gait belt and non skid socks used for safety  -           User Key  (r) = Recorded By, (t) = Taken By, (c) = Cosigned By    Initials Name Provider Type     Parvin Martinez OT Occupational Therapist                 Mobility/ADL's     Row Name 03/02/22 1242          Bed Mobility    Bed Mobility supine-sit; sit-supine  -     Supine-Sit [place] (Bed Mobility) standby assist  -     Sit-Supine [place] (Bed Mobility) standby assist  -     Row Name 03/02/22 1242          Transfers    Transfers sit-stand transfer; toilet transfer  -     Sit-Stand [place] (Transfers) minimum assist (75% patient effort); contact guard  -     [place] Level (Toilet Transfer) contact guard; minimum assist (75% patient effort); verbal cues  -     Assistive Device (Toilet Transfer) commode, 3-in-1  -     Row Name 03/02/22 1242          Sit-Stand Transfer    Assistive Device (Sit-Stand Transfers) walker, front-wheeled  -     Row Name 03/02/22 1242          Toilet Transfer    Type (Toilet Transfer) stand-sit; sit-stand  -     Row Name 03/02/22 1242          Functional Mobility    Functional Mobility-  Ind. Level not tested  -     Functional Mobility- Comment NT 2/2 orthostatic hypotension  -     Row Name 03/02/22 1242          Activities of Daily Living    BADL Assessment/Intervention lower body dressing  -     Row Name 03/02/22 1242          Lower Body Dressing Assessment/Training    [place] Level (Lower Body Dressing) don; socks; standby assist  -     Position (Lower Body Dressing) edge of bed sitting  -           User Key  (r) = Recorded By, (t) = Taken By, (c) = Cosigned By    Initials Name Provider Type    HEENA Parvin Martinez OT Occupational Therapist               Obj/Interventions     Row Name 03/02/22 1243          Sensory Assessment (Somatosensory)    Sensory Assessment (Somatosensory) sensation intact  -[facility] Name 03/02/22 1243          Vision Assessment/Intervention    Visual Impairment/Limitations WFL; corrective lenses full-time  -[facility] Name 03/02/22 1243          Range of Motion Comprehensive    General Range of Motion no range of motion deficits identified  -[facility] Name 03/02/22 1243          Strength Comprehensive (MMT)    Comment, General Manual Muscle Testing (MMT) Assessment generalized weakness throughout  -[facility] Name 03/02/22 1243          Motor Skills    Motor Skills functional endurance  -     Functional Endurance poor  -[facility] Name 03/02/22 1243          Balance    Balance Assessment sitting static balance; sitting dynamic balance; standing static balance; standing dynamic balance  -     Static Sitting Balance WFL; unsupported; sitting, edge of bed  -     Dynamic Sitting Balance WFL; unsupported; sitting, edge of bed  -     Static Standing Balance mild impairment; supported; standing  -     Dynamic Standing Balance mild impairment; supported; standing  -     Balance Interventions occupation based/functional task  -           User Key  (r) = Recorded By, (t) = Taken By, (c) = Cosigned By    Initials Name Provider Type    HEENA  Parvin Martinez OT Occupational Therapist               Goals/Plan     Row Name 03/02/22 1248          Transfer Goal 1 (OT)    Activity/Assistive Device (Transfer Goal 1, OT) transfers, all  -HEENA     [place] Level/Cues Needed (Transfer Goal 1, OT) independent  -HEENA     Time Frame (Transfer Goal 1, OT) short term goal (STG); 2 weeks  -HEENA     Row Name 03/02/22 1248          Bathing Goal 1 (OT)    Activity/Device (Bathing Goal 1, OT) bathing skills, all; shower chair  -HEENA     [place] Level/Cues Needed (Bathing Goal 1, OT) independent  -HEENA     Time Frame (Bathing Goal 1, OT) short term goal (STG); 2 weeks  -EHENA     Row Name 03/02/22 1248          Dressing Goal 1 (OT)    Activity/Device (Dressing Goal 1, OT) dressing skills, all  -HEENA     [place]/Cues Needed (Dressing Goal 1, OT) independent  -HEENA     Time Frame (Dressing Goal 1, OT) short term goal (STG); 2 weeks  -HEENA     Row Name 03/02/22 1248          Toileting Goal 1 (OT)    Activity/Device (Toileting Goal 1, OT) toileting skills, all  -HEENA     [place] Level/Cues Needed (Toileting Goal 1, OT) independent  -HEENA     Time Frame (Toileting Goal 1, OT) short term goal (STG); 2 weeks  -HEENA     Row Name 03/02/22 1248          Therapy Assessment/Plan (OT)    Planned Therapy Interventions (OT) activity tolerance training; functional balance retraining; occupation/activity based interventions; ROM/therapeutic exercise; strengthening exercise; transfer/mobility retraining; patient/caregiver education/training; neuromuscular control/coordination retraining; cognitive/visual perception retraining; edema control/reduction; BADL retraining; adaptive equipment training  -HEENA           User Key  (r) = Recorded By, (t) = Taken By, (c) = Cosigned By    Initials Name Provider Type    Parvin Marques OT Occupational Therapist               Clinical Impression     Row Name 03/02/22 1244          Pain Assessment    Additional Documentation Pain Scale: FACES  Pre/Post-Treatment (Group)  -     Row Name 03/02/22 1244          Pain Scale: Numbers Pre/Post-Treatment    Pain Intervention(s) Repositioned; Ambulation/increased activity  -HEENA     Row Name 03/02/22 1243          Pain Scale: FACES Pre/Post-Treatment    Pain: FACES Scale, Pretreatment 2-->hurts little bit  -HEENA     Posttreatment Pain Rating 4-->hurts little more  -HEENA     Row Name 03/02/22 1243          Plan of Care Review    Plan of Care Reviewed With patient  -     Outcome Summary Pt is a 65 y/o M admit with generalized weakness and c/o multiple falls at home. W/u for acute kidney injury. Recent admission 2/3/22 for hypertensive emergency and heart failure exacerbation. PMH includes DM, HTN, HLD, CKD 4, cancer, CVA, CABG. Pt resides with his spouse and adult son with special needs in a tri-level home with multiple steps. Pt reports indep with ADLs and mobility at baseline. Orthostatic BPs taken throughout session as noted: Supine 152/78 (109). EOB Seated: 117/76 (97). Standing EOB: 102/58 (74). Pt assisted to BSC with min A x 1. BP taken again after BLEs exercises: 129/83 (105). Pt remains seated at commode for safety x 5 min, again BP taken: 116/84 (104). Pt returned to EOB, then supine with same technique and level of assist. BP in supine at end of session: 134/78 (106). Pt appears moderately below ADL baseline, limited by impaired endurance, generalized weakness, impaired balance, impaired strength and acuity of illness. OT recommends 5x/week and d/c to inpatient rehab vs home with HH pending progress and medical stability.  -HEENA     Row Name 03/02/22 1249          Therapy Assessment/Plan (OT)    Rehab Potential (OT) good, to achieve stated therapy goals  -     Criteria for Skilled Therapeutic Interventions Met (OT) yes  -HEENA     Therapy Frequency (OT) 5 times/wk  -HEENA     Row Name 03/02/22 124          Therapy Plan Review/Discharge Plan (OT)    Anticipated Discharge Disposition (OT) inpatient rehabilitation  facility; home with home health  -HEENA     Row Name 03/02/22 1244          Vital Signs    Pre Systolic BP Rehab 152  -HEENA     Pre Treatment Diastolic BP 78  -HEENA     Intra Systolic BP Rehab 102  -HEENA     Intra Treatment Diastolic BP 58  -HEENA     Post Systolic BP Rehab 134  -HEENA     Post Treatment Diastolic BP 78  -HEENA     Pre Patient Position Supine  -HEENA     Intra Patient Position Standing  -HEENA     Post Patient Position Supine  -HEENA     Row Name 03/02/22 1244          Positioning and Restraints    Pre-Treatment Position in bed  -HEENA     Post Treatment Position bed  -HEENA     In Bed notified nsg; supine; call light within reach; encouraged to call for assist; exit alarm on  -HEENA           User Key  (r) = Recorded By, (t) = Taken By, (c) = Cosigned By    Initials Name Provider Type    Parvin Marques OT Occupational Therapist               Outcome Measures     Row Name 03/02/22 1248          How much help from another is currently needed...    Putting on and taking off regular lower body clothing? 4  -HEENA     Bathing (including washing, rinsing, and drying) 3  -HEENA     Toileting (which includes using toilet bed pan or urinal) 3  -HEENA     Putting on and taking off regular upper body clothing 3  -HEENA     Taking care of personal grooming (such as brushing teeth) 3  -HEENA     Eating meals 4  -HEENA     AM-PAC 6 Clicks Score (OT) 20  -HEENA     Row Name 03/02/22 1248          Functional Assessment    Outcome Measure Options AM-PAC 6 Clicks Daily Activity (OT)  -HEENA           User Key  (r) = Recorded By, (t) = Taken By, (c) = Cosigned By    Initials Name Provider Type    Parvin Marques OT Occupational Therapist                Occupational Therapy Education                 Title: PT OT SLP Therapies (In Progress)     Topic: Occupational Therapy (Done)     Point: ADL training (Done)     Description:   Instruct learner(s) on proper safety adaptation and remediation techniques during self care or transfers.   Instruct in proper use of  assistive devices.              Learning Progress Summary           Patient Acceptance, E,TB, VU by HEENA at 3/2/2022 1249                   Point: Home exercise program (Done)     Description:   Instruct learner(s) on appropriate technique for monitoring, assisting and/or progressing therapeutic exercises/activities.              Learning Progress Summary           Patient Acceptance, E,TB, VU by HEENA at 3/2/2022 1249                   Point: Precautions (Done)     Description:   Instruct learner(s) on prescribed precautions during self-care and functional transfers.              Learning Progress Summary           Patient Acceptance, E,TB, VU by HEENA at 3/2/2022 1249                   Point: Body mechanics (Done)     Description:   Instruct learner(s) on proper positioning and spine alignment during self-care, functional mobility activities and/or exercises.              Learning Progress Summary           Patient Acceptance, E,TB, VU by HEENA at 3/2/2022 1249                               User Key     Initials Effective Dates Name Provider Type Discipline    HEENA 06/16/21 -  Parvin Martinez OT Occupational Therapist OT              OT Recommendation and Plan  Planned Therapy Interventions (OT): activity tolerance training, functional balance retraining, occupation/activity based interventions, ROM/therapeutic exercise, strengthening exercise, transfer/mobility retraining, patient/caregiver education/training, neuromuscular control/coordination retraining, cognitive/visual perception retraining, edema control/reduction, BADL retraining, adaptive equipment training  Therapy Frequency (OT): 5 times/wk  Plan of Care Review  Plan of Care Reviewed With: patient  Outcome Summary: Pt is a 65 y/o M admit with generalized weakness and c/o multiple falls at home. W/u for acute kidney injury. Recent admission 2/3/22 for hypertensive emergency and heart failure exacerbation. PMH includes DM, HTN, HLD, CKD 4, cancer, CVA, CABG. Pt  resides with his spouse and adult son with special needs in a tri-level home with multiple steps. Pt reports indep with ADLs and mobility at baseline. Orthostatic BPs taken throughout session as noted: Supine 152/78 (109). EOB Seated: 117/76 (97). Standing EOB: 102/58 (74). Pt assisted to BSC with min A x 1. BP taken again after BLEs exercises: 129/83 (105). Pt remains seated at commode for safety x 5 min, again BP taken: 116/84 (104). Pt returned to EOB, then supine with same technique and level of assist. BP in supine at end of session: 134/78 (106). Pt appears moderately below ADL baseline, limited by impaired endurance, generalized weakness, impaired balance, impaired strength and acuity of illness. OT recommends 5x/week and d/c to inpatient rehab vs home with HH pending progress and medical stability.     Time Calculation:    Time Calculation- OT     Row Name 03/02/22 1249             Time Calculation- OT    OT Start Time 1040  -HEENA      OT Stop Time 1102  -HEENA      OT Time Calculation (min) 22 min  -HEENA      Total Timed Code Minutes- OT 12 minute(s)  -HEENA      OT Received On 03/02/22  -HEENA      OT - Next Appointment 03/03/22  -HEENA      OT Goal Re-Cert Due Date 03/16/22  -HEENA              Timed Charges    55662 - OT Self Care/Mgmt Minutes 12  -HEENA              Untimed Charges    OT Eval/Re-eval Minutes 10  -HEENA              Total Minutes    Timed Charges Total Minutes 12  -HEENA      Untimed Charges Total Minutes 10  -HEENA       Total Minutes 22  -HEENA            User Key  (r) = Recorded By, (t) = Taken By, (c) = Cosigned By    Initials Name Provider Type    Parvin Marques OT Occupational Therapist              Therapy Charges for Today     Code Description Service Date Service Provider Modifiers Qty    48022557222 HC OT SELF CARE/MGMT/TRAIN EA 15 MIN 3/2/2022 Parvin Martinez OT GO 1    28776814107 HC OT EVAL MOD COMPLEXITY 2 3/2/2022 Parvin Martinez OT GO 1               Parvin Martinez  OT  3/2/2022

## 2022-03-03 LAB
25(OH)D3 SERPL-MCNC: 23.7 NG/ML (ref 30–100)
ALBUMIN SERPL-MCNC: 3.1 G/DL (ref 3.5–5.2)
ANION GAP SERPL CALCULATED.3IONS-SCNC: 10 MMOL/L (ref 5–15)
BASOPHILS # BLD AUTO: 0.01 10*3/MM3 (ref 0–0.2)
BASOPHILS NFR BLD AUTO: 0.2 % (ref 0–1.5)
BUN SERPL-MCNC: 57 MG/DL (ref 8–23)
BUN/CREAT SERPL: 17.6 (ref 7–25)
CALCIUM SPEC-SCNC: 8.4 MG/DL (ref 8.6–10.5)
CHLORIDE SERPL-SCNC: 109 MMOL/L (ref 98–107)
CO2 SERPL-SCNC: 23 MMOL/L (ref 22–29)
CREAT SERPL-MCNC: 3.23 MG/DL (ref 0.76–1.27)
DEPRECATED RDW RBC AUTO: 42.2 FL (ref 37–54)
EGFRCR SERPLBLD CKD-EPI 2021: 20.3 ML/MIN/1.73
EOSINOPHIL # BLD AUTO: 0.18 10*3/MM3 (ref 0–0.4)
EOSINOPHIL NFR BLD AUTO: 3.3 % (ref 0.3–6.2)
ERYTHROCYTE [DISTWIDTH] IN BLOOD BY AUTOMATED COUNT: 14.5 % (ref 12.3–15.4)
GLUCOSE BLDC GLUCOMTR-MCNC: 189 MG/DL (ref 70–130)
GLUCOSE BLDC GLUCOMTR-MCNC: 225 MG/DL (ref 70–130)
GLUCOSE BLDC GLUCOMTR-MCNC: 61 MG/DL (ref 70–130)
GLUCOSE BLDC GLUCOMTR-MCNC: 78 MG/DL (ref 70–130)
GLUCOSE BLDC GLUCOMTR-MCNC: 94 MG/DL (ref 70–130)
GLUCOSE SERPL-MCNC: 107 MG/DL (ref 65–99)
HCT VFR BLD AUTO: 31.8 % (ref 37.5–51)
HGB BLD-MCNC: 10.4 G/DL (ref 13–17.7)
IMM GRANULOCYTES # BLD AUTO: 0.02 10*3/MM3 (ref 0–0.05)
IMM GRANULOCYTES NFR BLD AUTO: 0.4 % (ref 0–0.5)
LYMPHOCYTES # BLD AUTO: 1.18 10*3/MM3 (ref 0.7–3.1)
LYMPHOCYTES NFR BLD AUTO: 21.6 % (ref 19.6–45.3)
MCH RBC QN AUTO: 26.3 PG (ref 26.6–33)
MCHC RBC AUTO-ENTMCNC: 32.7 G/DL (ref 31.5–35.7)
MCV RBC AUTO: 80.3 FL (ref 79–97)
MONOCYTES # BLD AUTO: 0.55 10*3/MM3 (ref 0.1–0.9)
MONOCYTES NFR BLD AUTO: 10.1 % (ref 5–12)
NEUTROPHILS NFR BLD AUTO: 3.53 10*3/MM3 (ref 1.7–7)
NEUTROPHILS NFR BLD AUTO: 64.4 % (ref 42.7–76)
NRBC BLD AUTO-RTO: 0 /100 WBC (ref 0–0.2)
PHOSPHATE SERPL-MCNC: 4.3 MG/DL (ref 2.5–4.5)
PLATELET # BLD AUTO: 224 10*3/MM3 (ref 140–450)
PMV BLD AUTO: 10.5 FL (ref 6–12)
POTASSIUM SERPL-SCNC: 4.4 MMOL/L (ref 3.5–5.2)
RBC # BLD AUTO: 3.96 10*6/MM3 (ref 4.14–5.8)
SODIUM SERPL-SCNC: 142 MMOL/L (ref 136–145)
WBC NRBC COR # BLD: 5.47 10*3/MM3 (ref 3.4–10.8)

## 2022-03-03 PROCEDURE — 97110 THERAPEUTIC EXERCISES: CPT | Performed by: OCCUPATIONAL THERAPIST

## 2022-03-03 PROCEDURE — 82306 VITAMIN D 25 HYDROXY: CPT | Performed by: HOSPITALIST

## 2022-03-03 PROCEDURE — 85025 COMPLETE CBC W/AUTO DIFF WBC: CPT | Performed by: HOSPITALIST

## 2022-03-03 PROCEDURE — 63710000001 INSULIN LISPRO (HUMAN) PER 5 UNITS: Performed by: HOSPITALIST

## 2022-03-03 PROCEDURE — 97535 SELF CARE MNGMENT TRAINING: CPT | Performed by: OCCUPATIONAL THERAPIST

## 2022-03-03 PROCEDURE — 82962 GLUCOSE BLOOD TEST: CPT

## 2022-03-03 PROCEDURE — 80069 RENAL FUNCTION PANEL: CPT | Performed by: INTERNAL MEDICINE

## 2022-03-03 RX ORDER — BUPROPION HYDROCHLORIDE 300 MG/1
300 TABLET ORAL EVERY MORNING
Status: DISCONTINUED | OUTPATIENT
Start: 2022-03-04 | End: 2022-03-08 | Stop reason: HOSPADM

## 2022-03-03 RX ORDER — BISACODYL 10 MG
10 SUPPOSITORY, RECTAL RECTAL ONCE
Status: COMPLETED | OUTPATIENT
Start: 2022-03-03 | End: 2022-03-03

## 2022-03-03 RX ORDER — POLYETHYLENE GLYCOL 3350 17 G/17G
17 POWDER, FOR SOLUTION ORAL DAILY
Status: DISCONTINUED | OUTPATIENT
Start: 2022-03-03 | End: 2022-03-08 | Stop reason: HOSPADM

## 2022-03-03 RX ADMIN — CARVEDILOL 3.12 MG: 3.12 TABLET, FILM COATED ORAL at 17:21

## 2022-03-03 RX ADMIN — BUPROPION HYDROCHLORIDE 150 MG: 150 TABLET, EXTENDED RELEASE ORAL at 06:23

## 2022-03-03 RX ADMIN — INSULIN LISPRO 3 UNITS: 100 INJECTION, SOLUTION INTRAVENOUS; SUBCUTANEOUS at 22:09

## 2022-03-03 RX ADMIN — LEVOTHYROXINE SODIUM 75 MCG: 0.07 TABLET ORAL at 09:10

## 2022-03-03 RX ADMIN — APIXABAN 2.5 MG: 2.5 TABLET, FILM COATED ORAL at 22:09

## 2022-03-03 RX ADMIN — INSULIN GLARGINE-YFGN 10 UNITS: 100 INJECTION, SOLUTION SUBCUTANEOUS at 22:10

## 2022-03-03 RX ADMIN — ASPIRIN 81 MG: 81 TABLET, COATED ORAL at 09:10

## 2022-03-03 RX ADMIN — POLYETHYLENE GLYCOL 3350 17 G: 17 POWDER, FOR SOLUTION ORAL at 13:44

## 2022-03-03 RX ADMIN — SODIUM CHLORIDE 75 ML/HR: 9 INJECTION, SOLUTION INTRAVENOUS at 00:16

## 2022-03-03 RX ADMIN — APIXABAN 2.5 MG: 2.5 TABLET, FILM COATED ORAL at 09:10

## 2022-03-03 RX ADMIN — BISACODYL 10 MG: 10 SUPPOSITORY RECTAL at 13:44

## 2022-03-03 RX ADMIN — TAMSULOSIN HYDROCHLORIDE 0.4 MG: 0.4 CAPSULE ORAL at 22:09

## 2022-03-03 RX ADMIN — INSULIN LISPRO 2 UNITS: 100 INJECTION, SOLUTION INTRAVENOUS; SUBCUTANEOUS at 17:20

## 2022-03-03 RX ADMIN — ATORVASTATIN CALCIUM 10 MG: 10 TABLET, FILM COATED ORAL at 22:09

## 2022-03-03 RX ADMIN — FAMOTIDINE 20 MG: 20 TABLET ORAL at 09:10

## 2022-03-03 RX ADMIN — Medication 1000 UNITS: at 09:10

## 2022-03-03 RX ADMIN — FAMOTIDINE 20 MG: 20 TABLET ORAL at 22:09

## 2022-03-03 RX ADMIN — INSULIN LISPRO 3 UNITS: 100 INJECTION, SOLUTION INTRAVENOUS; SUBCUTANEOUS at 09:10

## 2022-03-03 NOTE — PROGRESS NOTES
"Daily progress note    Chief complaint  Doing same  No new complaints  Denies chest pain shortness of breath palpitation     History of present illness  66-year-old white male who is well-known to our service with history of diabetes hypertension hyperlipidemia and chronic kidney disease stage IV presented to [facility] emergency room with generalized weakness and he fell at home.  Patient denies any injury.  Patient does have dizziness lightheadedness but denies any chest pain increase shortness of breath abdominal pain nausea vomiting diarrhea.  Patient work-up in ER revealed acute kidney injury on top of chronic kidney disease stage IV admit for management.  Patient has chronic elevated troponin with no chest pain.     REVIEW OF SYSTEMS  Unremarkable except generalized weakness     PHYSICAL EXAM  Blood pressure 156/86, pulse 54, temperature 97.5 °F (36.4 °C), temperature source Oral, resp. rate 18, height 182.9 cm (72\"), weight 76 kg (167 lb 8.8 oz), SpO2 100 %.    Constitutional:       General: He is not in acute distress.  HENT:      Head: Normocephalic and atraumatic.   Eyes:      Pupils: Pupils are equal, round, and reactive to light.   Cardiovascular:      Rate and Rhythm: Normal rate and regular rhythm.      Heart sounds: Normal heart sounds.   Pulmonary:      Effort: Pulmonary effort is normal. No respiratory distress.      Breath sounds: Normal breath sounds.   Abdominal:      Palpations: Abdomen is soft.      Tenderness: There is no abdominal tenderness. There is no guarding or rebound.   Musculoskeletal:         General: Normal range of motion.      Cervical back: Normal range of motion and neck supple.   Skin:     General: Skin is warm and dry.   Neurological:      Mental Status: He is alert and oriented to person, place, and time.      Sensory: Sensation is intact.   Psychiatric:         Mood and Affect: Mood and affect normal.      LAB RESULTS  Lab Results (last 24 hours)     Procedure " Component Value Units Date/Time    POC Glucose Once [[id number]]  (Normal) Collected: 03/03/22 1216    Specimen: Blood Updated: 03/03/22 1218     Glucose 78 mg/dL      Comment: Meter: [id number] : [id number] Scotty YI RN       POC Glucose Once [[id number]]  (Abnormal) Collected: 03/03/22 1146    Specimen: Blood Updated: 03/03/22 1149     Glucose 61 mg/dL      Comment: Meter: [id number] : [id number] Frank RAMIREZ       Vitamin D 25 Hydroxy [[id number]]  (Abnormal) Collected: 03/03/22 0425    Specimen: Blood Updated: 03/03/22 0605     25 Hydroxy, Vitamin D 23.7 ng/ml     Narrative:      Reference Range for Total Vitamin D 25(OH)     Deficiency <20.0 ng/mL   Insufficiency 21-29 ng/mL   Sufficiency  ng/mL  Toxicity >100 ng/ml    Results may be falsely increased if patient taking Biotin.      Renal Function Panel [[id number]]  (Abnormal) Collected: 03/03/22 0425    Specimen: Blood Updated: 03/03/22 0548     Glucose 107 mg/dL      BUN 57 mg/dL      Creatinine 3.23 mg/dL      Sodium 142 mmol/L      Potassium 4.4 mmol/L      Chloride 109 mmol/L      CO2 23.0 mmol/L      Calcium 8.4 mg/dL      Albumin 3.10 g/dL      Phosphorus 4.3 mg/dL      Anion Gap 10.0 mmol/L      BUN/Creatinine Ratio 17.6     eGFR 20.3 mL/min/1.73      Comment: National Kidney Foundation and American Society of Nephrology (ASN) Task Force recommended calculation based on the Chronic Kidney Disease Epidemiology Collaboration (CKD-EPI) equation refit without adjustment for race.       Narrative:      GFR Normal >60  Chronic Kidney Disease <60  Kidney Failure <15      POC Glucose Once [[id number]]  (Normal) Collected: 03/03/22 0535    Specimen: Blood Updated: 03/03/22 0536     Glucose 94 mg/dL      Comment: Meter: [id number] : [id number] Diogo RAMIREZ       CBC & Differential [[id number]]  (Abnormal) Collected: 03/03/22 0425    Specimen: Blood Updated: 03/03/22 0506    Narrative:      The following orders were created for panel order CBC &  Differential.  Procedure                               Abnormality         Status                     ---------                               -----------         ------                     CBC Auto Differential[[id number]]        Abnormal            Final result                 Please view results for these tests on the individual orders.    CBC Auto Differential [[id number]]  (Abnormal) Collected: 03/03/22 0425    Specimen: Blood Updated: 03/03/22 0506     WBC 5.47 10*3/mm3      RBC 3.96 10*6/mm3      Hemoglobin 10.4 g/dL      Hematocrit 31.8 %      MCV 80.3 fL      MCH 26.3 pg      MCHC 32.7 g/dL      RDW 14.5 %      RDW-SD 42.2 fl      MPV 10.5 fL      Platelets 224 10*3/mm3      Neutrophil % 64.4 %      Lymphocyte % 21.6 %      Monocyte % 10.1 %      Eosinophil % 3.3 %      Basophil % 0.2 %      Immature Grans % 0.4 %      Neutrophils, Absolute 3.53 10*3/mm3      Lymphocytes, Absolute 1.18 10*3/mm3      Monocytes, Absolute 0.55 10*3/mm3      Eosinophils, Absolute 0.18 10*3/mm3      Basophils, Absolute 0.01 10*3/mm3      Immature Grans, Absolute 0.02 10*3/mm3      nRBC 0.0 /100 WBC     POC Glucose Once [[id number]]  (Abnormal) Collected: 03/02/22 2019    Specimen: Blood Updated: 03/02/22 2021     Glucose 231 mg/dL      Comment: Meter: [id number] : [id number] Diogo RAMIREZ       POC Glucose Once [[id number]]  (Normal) Collected: 03/02/22 1641    Specimen: Blood Updated: 03/02/22 1643     Glucose 110 mg/dL      Comment: Meter: [id number] : [id number] Frandy RAMIREZ           Imaging Results (Last 24 Hours)     ** No results found for the last 24 hours. **             ECG 12 Lead  Component   Ref Range & Units 3/1/22 0256 2/3/22 0604 2/2/22 0342 2/1/22 1535 12/15/21 1248 5/20/21 2206   QT Interval   ms 482 P  488  482  491  511  469              HEART RATE= 61  bpm  RR Interval= 988  ms  IL Interval= 68  ms  P Horizontal Axis= 2  deg  P Front Axis= -55  deg  QRSD Interval= 172  ms  QT Interval= 482  ms  QRS Axis=  -86  deg  T Wave Axis= 86  deg  - ABNORMAL ECG -  Sinus or ectopic atrial rhythm  Short OR interval  Right bundle branch block  Anteroseptal infarct, age indeterminate             Current Facility-Administered Medications:   •  apixaban (ELIQUIS) tablet 2.5 mg, 2.5 mg, Oral, Q12H, Josue Pickens MD, 2.5 mg at 03/03/22 0910  •  aspirin EC tablet 81 mg, 81 mg, Oral, Daily, Josue Pickens MD, 81 mg at 03/03/22 0910  •  atorvastatin (LIPITOR) tablet 10 mg, 10 mg, Oral, Nightly, Josue Pickens MD, 10 mg at 03/02/22 2115  •  bisacodyl (DULCOLAX) suppository 10 mg, 10 mg, Rectal, Once, Zahida Hsu MD  •  [START ON 3/4/2022] buPROPion XL (WELLBUTRIN XL) 24 hr tablet 300 mg, 300 mg, Oral, Griselda MUIR Charles Brook III, MD  •  carvedilol (COREG) tablet 3.125 mg, 3.125 mg, Oral, BID With Meals, Josue Pickens MD, 3.125 mg at 03/02/22 1730  •  cholecalciferol (VITAMIN D3) tablet 1,000 Units, 1,000 Units, Oral, Daily, Josue Pickens MD, 1,000 Units at 03/03/22 0910  •  famotidine (PEPCID) tablet 20 mg, 20 mg, Oral, BID, Josue Pickens MD, 20 mg at 03/03/22 0910  •  insulin glargine (LANTUS, SEMGLEE) injection 10 Units, 10 Units, Subcutaneous, Nightly, Josue Pickens MD, 10 Units at 03/02/22 2115  •  insulin lispro (ADMELOG) injection 0-7 Units, 0-7 Units, Subcutaneous, 4x Daily With Meals & Nightly, Josue Pickens MD, 3 Units at 03/02/22 2116  •  insulin lispro (ADMELOG) injection 3 Units, 3 Units, Subcutaneous, TID AC, Josue Pickens MD, 3 Units at 03/03/22 0910  •  levothyroxine (SYNTHROID, LEVOTHROID) tablet 75 mcg, 75 mcg, Oral, Daily, Josue Pickens MD, 75 mcg at 03/03/22 0910  •  polyethylene glycol (MIRALAX) packet 17 g, 17 g, Oral, Daily, Zahida Hsu MD  •  [COMPLETED] Insert peripheral IV, , , Once **AND** sodium chloride 0.9 % flush 10 mL, 10 mL, Intravenous, PRN, Sergio Burgess MD  •  tamsulosin (FLOMAX) 24 hr capsule 0.4 mg, 0.4 mg, Oral, Nightly, Josue Pickens MD, 0.4 mg at 03/02/22 2115  •   zolpidem (AMBIEN) tablet 5 mg, 5 mg, Oral, Nightly PRN, Bob Villalobos III, MD, 5 mg at 03/02/22 2123    ASSESSMENT  Acute kidney injury   Chronic kidney disease stage IV  Elevated troponin with no chest pain  Diabetes mellitus  Hypertension with low BP  Hyperlipidemia  Gastroesophageal reflux disease    PLAN  CPM  Discontinue IVF  Resume diuretics in a.m.  Nephrology consult appreciated  Cardiology and psychiatry to follow patient  Adjust home medications  Stress ulcer DVT prophylaxis  Supportive care   PT/OT  Discussed with nursing staff   Discharge planning    ORA MCDONALD MD

## 2022-03-03 NOTE — PLAN OF CARE
Goal Outcome Evaluation:  Plan of Care Reviewed With: patient        Progress: improving  Outcome Summary: No c/o pain or soa. Mood seems better today, more awake today. Up in chair. Miralax and dulcolax supp given. IVF stopped. Blood sugar dropped to 61 at lunch time, scheduled mealtime insulin discontinued. Patient hopeful to go home tomorrow.

## 2022-03-03 NOTE — PROGRESS NOTES
[facility] Progress Note      Patient Name: Carlito Mo  : 1955  MRN: [id number]  Primary Care Physician:  Florencia Caballero MD  Date of admission: 3/1/2022    Subjective     Interval History:   Follow up SYLVAIN on CKD IV. Feels better.  Has not been up yet today.  NO bm. Passing gas. Eating. Urinating.     Review of Systems:   As noted above    Objective     Vitals:   Temp:  [97.5 °F (36.4 °C)-97.9 °F (36.6 °C)] 97.5 °F (36.4 °C)  Heart Rate:  [54-67] 54  Resp:  [18] 18  BP: (119-156)/(65-86) 156/86    Intake/Output Summary (Last 24 hours) at 3/3/2022 0917  Last data filed at 3/3/2022 0912  Gross per 24 hour   Intake 100 ml   Output 1300 ml   Net -1200 ml       Physical Exam:    General Appearance: alert, oriented x 3, no acute distress . Chronically ill.   Skin: warm and dry  HEENT: oral mucosa normal, nonicteric sclera  Neck: supple, no JVD  Lungs: Clear to auscultation   Heart: RRR, normal S1 and S2. No rub .  Abdomen: soft, nontender, non-distended. + bs  Extremities: no edema.  Neuro: normal speech and mental status     Scheduled Meds:     apixaban, 2.5 mg, Oral, Q12H  aspirin, 81 mg, Oral, Daily  atorvastatin, 10 mg, Oral, Nightly  buPROPion XL, 150 mg, Oral, QAM  carvedilol, 3.125 mg, Oral, BID With Meals  cholecalciferol, 1,000 Units, Oral, Daily  famotidine, 20 mg, Oral, BID  insulin glargine, 10 Units, Subcutaneous, Nightly  insulin lispro, 0-7 Units, Subcutaneous, 4x Daily With Meals & Nightly  insulin lispro, 3 Units, Subcutaneous, TID AC  levothyroxine, 75 mcg, Oral, Daily  tamsulosin, 0.4 mg, Oral, Nightly      IV Meds:   sodium chloride, 75 mL/hr, Last Rate: 75 mL/hr (22 0016)        Results Reviewed:   I have personally reviewed the results from the time of this admission to 3/3/2022 09:17 EST     Results from last 7 days   Lab Units 22  0425 22  0622 22  0402   SODIUM mmol/L 142 140 138   POTASSIUM mmol/L 4.4 3.9 4.1   CHLORIDE mmol/L 109*  107 105   CO2 mmol/L 23.0 20.6* 18.7*   BUN mg/dL 57* 68* 68*   CREATININE mg/dL 3.23* 3.88* 4.31*   CALCIUM mg/dL 8.4* 9.2 8.8   BILIRUBIN mg/dL  --  0.2 0.2   ALK PHOS U/L  --  89 97   ALT (SGPT) U/L  --  49* 60*   AST (SGOT) U/L  --  22 33   GLUCOSE mg/dL 107* 81 242*       Estimated Creatinine Clearance: 24.2 mL/min (A) (by C-G formula based on SCr of 3.23 mg/dL (H)).    Results from last 7 days   Lab Units 03/03/22  0425 03/02/22  0622   MAGNESIUM mg/dL  --  2.0   PHOSPHORUS mg/dL 4.3  --        Results from last 7 days   Lab Units 03/02/22  0622   URIC ACID mg/dL 9.3*       Results from last 7 days   Lab Units 03/03/22  0425 03/02/22  0622 03/01/22  0402   WBC 10*3/mm3 5.47 6.68 8.30   HEMOGLOBIN g/dL 10.4* 11.5* 11.9*   PLATELETS 10*3/mm3 224 250 240       Results from last 7 days   Lab Units 03/01/22  0402   INR  1.07       Assessment / Plan     ASSESSMENT:  1.  SYLVAIN on CKD IV> Baseline crt in 3's.  Volume depletion due to diuresis. Improved with IVF.   2. Chronic combined systolic and diastolic heart failure. Compensated.  3. DM2  4. History of CVA  5. CAD sp CABG.   PLAN:  1. Dc IVF  2. Dulcolax suppos  3. Miralax  4. Likely resume torsemide tomorrow.  5. If renal function stable tomorrow, could go home.  6. Has follow up with Dr. Bryan Huddleston March 21 at 2:15 pm.       Zahida Hsu MD  03/03/22  09:17 [facility]    [facility]""  [phone]

## 2022-03-03 NOTE — PROGRESS NOTES
The patient is up in his chair and a bit brighter today.  I have discussed optimization of his Wellbutrin dose to a more definitive 1 of 300 mg.  The patient is agreeable with this plan.  He is hopeful for a.m. discharge.

## 2022-03-03 NOTE — PLAN OF CARE
Goal Outcome Evaluation:  Plan of Care Reviewed With: patient        Progress: improving  Outcome Summary: pt worked w OT w SBA bed mobility, sitting EOB SBA, total A w socks, tsf to chair w CGA, washes face SBA and brushes teeth SBA. pt BP taken while sitting and was not positive today for orthostatics. /85. Pt progressing well in therapy. cont OT to incr ADL, strength, balance, and tsf. pt declied a bath but states will later.  OT wore all PPE, washed hands before/after

## 2022-03-03 NOTE — NURSING NOTE
[facility] center follow up note regarding depression. Therapy is about to help pt out of bed into chair. He is glad to be getting out of bed, saying that he gets more depressed staying in bed all the time. He states his depression level is the same as yesterday. His affect is flat, and he does not feel like talking too much. Resources have already been given to pt for therapy.     Kadeem and Dr. Villalobos following.

## 2022-03-03 NOTE — PLAN OF CARE
Goal Outcome Evaluation:  Plan of Care Reviewed With: patient        Progress: no change  Outcome Summary: Slept most of night. PRN ambien given. IV fluids continued. Fall precautions in place. Will continue to monitor.

## 2022-03-03 NOTE — THERAPY TREATMENT NOTE
Patient Name: Carlito Mo  : 1955    MRN: [id number]                              Today's Date: 3/3/2022       Admit Date: 3/1/2022    Visit Dx:     ICD-10-CM ICD-9-CM   1. Generalized weakness  R53.1 780.79   2. Acute on chronic renal insufficiency  N28.9 593.9    N18.9 585.9   3. Fall, initial encounter  W19.XXXA E888.9   4. Elevated troponin  R77.8 790.6     Patient Active Problem List   Diagnosis   • Major depressive disorder with single episode, in partial remission (Formerly [facility])   • Other hyperlipidemia   • Vitamin D deficiency   • Erectile dysfunction   • Chronic fatigue   • Type 2 diabetes mellitus with ophthalmic complication (Formerly [facility])   • Skin lesion of chest wall   • Slow transit constipation   • Benign prostatic hyperplasia with nocturia   • History of basal cell carcinoma   • High blood pressure associated with diabetes (Formerly [facility])   • Acquired hypothyroidism   • Hypertensive urgency   • Recurrent strokes (Formerly [facility])   • Urinary retention   • Pneumonia   • Acute on chronic combined systolic and diastolic congestive heart failure (Formerly [facility])   • Acute respiratory failure with hypoxia (Formerly [facility])   • Suspected sleep apnea   • Pleural effusion   • YAW (obstructive sleep apnea)   • Coronary artery disease involving native coronary artery of native heart without angina pectoris   • Chronically elevated troponin   • Colon cancer screening   • Enlarged lymph nodes   • Functional gait abnormality   • History of myocardial infarction   • Hospital discharge follow-up   • Insomnia   • Iron deficiency anemia   • Major depression, recurrent (Formerly [facility])   • Anemia, chronic renal failure, stage 3 (moderate) (Formerly [facility])   • Essential hypertension   • Adverse effect of iron and its compounds, initial encounter   • Acute on chronic renal insufficiency   • Debility   • Falls frequently   • Acute pain of right shoulder   • Acute on chronic anemia   • Heme positive stool   • Neurogenic orthostatic hypotension (Formerly [facility])   • Symptomatic anemia   • History of colon  polyps   • Helicobacter pylori gastritis   • Esophagitis   • Sleep related hypoxia   • Embolic stroke (HCC)   • Patent foramen ovale   • Pleural effusion, bilateral   • Cardiomyopathy (HCC)   • Lower abdominal pain   • Diarrhea   • CKD (chronic kidney disease) stage 4, GFR 15-29 ml/min (HCC)   • Hypertension not at goal   • Memory loss due to medical condition   • Generalized weakness     Past Medical History:   Diagnosis Date   • Acquired hypothyroidism    • Acute congestive heart failure (HCC)    • Acute respiratory failure with hypoxia (HCC)    • Acute sinusitis    • SYLVAIN (acute kidney injury) (HCC)     on CKD   • Anemia    • Arthritis    • CAD (coronary artery disease)    • Cancer (HCC)     skin   • Chronic combined systolic and diastolic congestive heart failure (HCC)    • Chronic ischemic heart disease    • CKD (chronic kidney disease)    • COPD (chronic obstructive pulmonary disease) (HCC)    • Depression    • Diabetes mellitus type 2, uncontrolled, without complications    • Disease of thyroid gland    • Elevated cholesterol    • Fatigue    • Frequent PVCs    • GERD (gastroesophageal reflux disease)    • Heart attack (HCC)    • History of coronary artery disease     with remote history of bypass surgery in 2001   • History of transfusion    • Hyperglycemia    • Hyperlipidemia    • Hypertension    • Hypertensive encephalopathy    • Mental status change     Acute   • YAW on CPAP    • Pleural effusion    • Pneumonia    • RBBB (right bundle branch block)    • Screening for prostate cancer 2011   • Stroke (HCC)    • TIA (transient ischemic attack)    • Type 2 diabetes mellitus (HCC)    • Urinary retention    • Vitamin D deficiency      Past Surgical History:   Procedure Laterality Date   • APPENDECTOMY N/A 02/14/2016    Dr. Alexey Dodson   • COLONOSCOPY      over 20 years ago.  no polyps    • COLONOSCOPY N/A 9/18/2018    Procedure: COLONOSCOPY;  Surgeon: Jose Enrique Louie MD;  Location: [facility] ENDOSCOPY;   Service: Gastroenterology   • COLONOSCOPY N/A 9/19/2018    IH, EH, polyps (TAs w/low grade dysplasia)   • COLONOSCOPY N/A 6/1/2020    Procedure: COLONOSCOPY;  Surgeon: Jose Enrique Louie MD;  Location: [facility] ENDOSCOPY;  Service: Gastroenterology;  Laterality: N/A;  Pre op-Anemia, Melena, History of Polyps  Post op-To Cecum/TI, Polyp, Poor Prep   • CORONARY ARTERY BYPASS GRAFT  11/2001   • ENDOSCOPY N/A 5/29/2020    Procedure: ESOPHAGOGASTRODUODENOSCOPY with biopsies;  Surgeon: Amanda Lovelace MD;  Location: [facility] ENDOSCOPY;  Service: Gastroenterology;  Laterality: N/A;  pre-anemia, dark stools  post-esophagitis, hiatal hernia   • ENDOSCOPY N/A 9/15/2020    Procedure: ESOPHAGOGASTRODUODENOSCOPY WITH BIOPSIES;  Surgeon: Jose Enrique Louie MD;  Location: [facility] ENDOSCOPY;  Service: Gastroenterology;  Laterality: N/A;  pre: history of melena and esophagitis  post: mild esophagitis and gastritis, small hiatal hernia   • HERNIA REPAIR Left 12/05/2019   • TONSILLECTOMY     • VASECTOMY        General Information     Row Name 03/03/22 1159          OT Time and Intention    Document Type therapy note (daily note)  -     Mode of Treatment individual therapy; occupational therapy  -     Row Name 03/03/22 1159          General Information    Existing Precautions/Restrictions fall; other (see comments)  can have orthostatics but did not today during therapy  -     Row Name 03/03/22 1159          Cognition    Orientation Status (Cognition) oriented x 4  -     Row Name 03/03/22 1159          Safety Issues, Functional Mobility    Impairments Affecting Function (Mobility) balance; strength; endurance/activity tolerance  -           User Key  (r) = Recorded By, (t) = Taken By, (c) = Cosigned By    Initials Name Provider Type     Beatriz Balderrama, OTR Occupational Therapist                 Mobility/ADL's     Row Name 03/03/22 1201          Bed Mobility    Supine-Sit [place] (Bed Mobility) set up; standby  assist  -KP     Sit-Supine [place] (Bed Mobility) not tested  -KP     Row Name 03/03/22 1201          Transfers    Transfers sit-stand transfer; bed-chair transfer  -KP     Bed-Chair [place] (Transfers) set up; contact guard  -KP     Sit-Stand [place] (Transfers) set up; contact guard  -KP     Row Name 03/03/22 1201          Activities of Daily Living    BADL Assessment/Intervention grooming  -     Row Name 03/03/22 1201          Lower Body Dressing Assessment/Training    [place] Level (Lower Body Dressing) lower body dressing skills; don; socks; dependent (less than 25% patient effort)  -     Position (Lower Body Dressing) edge of bed sitting  -     Comment (Lower Body Dressing) pt states OT should do it, he is worried to do it himself  -KP     Row Name 03/03/22 1201          Grooming Assessment/Training    [place] Level (Grooming) grooming skills; wash face, hands; oral care regimen; set up; standby assist  -KP     Position (Grooming) supported sitting  -KP           User Key  (r) = Recorded By, (t) = Taken By, (c) = Cosigned By    Initials Name Provider Type    Beatriz Velasquez OTR Occupational Therapist               Obj/Interventions     Row Name 03/03/22 1208          Motor Skills    Functional Endurance fair +  -     Row Name 03/03/22 1208          Balance    Static Sitting Balance WFL; sitting, edge of bed  -KP     Dynamic Sitting Balance WFL; sitting, edge of bed  -KP     Static Standing Balance mild impairment; standing; supported  -KP     Balance Interventions sitting; standing; sit to stand; supported; static; occupation based/functional task  -     Comment, Balance progressing well w balance and not positive for orthostatics today  -           User Key  (r) = Recorded By, (t) = Taken By, (c) = Cosigned By    Initials Name Provider Type    Beatriz Velasquez OTR Occupational Therapist               Goals/Plan    No documentation.                 Clinical Impression     Row Name 03/03/22 1210          Pain Assessment    Additional Documentation Pain Scale: Numbers Pre/Post-Treatment (Group)  -     Row Name 03/03/22 1210          Pain Scale: Numbers Pre/Post-Treatment    Pretreatment Pain Rating 0/10 - no pain  -     Posttreatment Pain Rating 0/10 - no pain  -     Row Name 03/03/22 1210          Plan of Care Review    Plan of Care Reviewed With patient  -     Progress improving  -     Outcome Summary pt worked w OT w SBA bed mobility, sitting EOB SBA, total A w socks, tsf to chair w CGA, washes face SBA and brushes teeth SBA. pt BP taken while sitting and was not positive today for orthostatics. /85. Pt progressing well in therapy. cont OT to incr ADL, strength, balance, and tsf. pt declied a bath but states will later.  -     Row Name 03/03/22 1210          Vital Signs    Pre Treatment Diastolic   -KP     Intra Treatment Diastolic BP 84  -KP     Row Name 03/03/22 1210          Positioning and Restraints    Pre-Treatment Position in bed  -KP     Post Treatment Position chair  -KP     In Chair reclined; call light within reach; encouraged to call for assist; exit alarm on  -KP           User Key  (r) = Recorded By, (t) = Taken By, (c) = Cosigned By    Initials Name Provider Type    Beatriz Velasquez, OTR Occupational Therapist               Outcome Measures     Row Name 03/03/22 1214          How much help from another is currently needed...    Putting on and taking off regular lower body clothing? 2  -KP     Bathing (including washing, rinsing, and drying) 2  -KP     Toileting (which includes using toilet bed pan or urinal) 3  -KP     Putting on and taking off regular upper body clothing 3  -KP     Taking care of personal grooming (such as brushing teeth) 3  -KP     Eating meals 4  -KP     AM-PAC 6 Clicks Score (OT) 17  -KP     Row Name 03/03/22 1214          Functional Assessment    Outcome Measure Options AM-PAC 6 Clicks  Daily Activity (OT)  -           User Key  (r) = Recorded By, (t) = Taken By, (c) = Cosigned By    Initials Name Provider Type    Beatriz Velasquez, OTR Occupational Therapist                Occupational Therapy Education                 Title: PT OT SLP Therapies (In Progress)     Topic: Occupational Therapy (Done)     Point: ADL training (Done)     Description:   Instruct learner(s) on proper safety adaptation and remediation techniques during self care or transfers.   Instruct in proper use of assistive devices.              Learning Progress Summary           Patient Acceptance, E,TB, VU by HEENA at 3/2/2022 1249                   Point: Home exercise program (Done)     Description:   Instruct learner(s) on appropriate technique for monitoring, assisting and/or progressing therapeutic exercises/activities.              Learning Progress Summary           Patient Acceptance, E,TB, VU by  at 3/2/2022 1249                   Point: Precautions (Done)     Description:   Instruct learner(s) on prescribed precautions during self-care and functional transfers.              Learning Progress Summary           Patient Acceptance, E,TB, VU by  at 3/2/2022 1249                   Point: Body mechanics (Done)     Description:   Instruct learner(s) on proper positioning and spine alignment during self-care, functional mobility activities and/or exercises.              Learning Progress Summary           Patient Acceptance, E,TB, VU by  at 3/2/2022 1249                               User Key     Initials Effective Dates Name Provider Type Discipline     06/16/21 -  Parvin Martinez OT Occupational Therapist OT              OT Recommendation and Plan     Plan of Care Review  Plan of Care Reviewed With: patient  Progress: improving  Outcome Summary: pt worked w OT w SBA bed mobility, sitting EOB SBA, total A w socks, tsf to chair w CGA, washes face SBA and brushes teeth SBA. pt BP taken while sitting and was  not positive today for orthostatics. /85. Pt progressing well in therapy. cont OT to incr ADL, strength, balance, and tsf. pt declied a bath but states will later.     Time Calculation:    Time Calculation- OT     Row Name 03/03/22 1215             Time Calculation- OT    OT Start Time 0922  -      OT Stop Time 0946  -      OT Time Calculation (min) 24 min  -KP      Total Timed Code Minutes- OT 24 minute(s)  -KP      OT Received On 03/03/22  -      OT - Next Appointment 03/04/22  -              Timed Charges    93841 - OT Therapeutic Exercise Minutes 15  -KP      25475 - OT Self Care/Mgmt Minutes 9  -KP              Total Minutes    Timed Charges Total Minutes 24  -KP       Total Minutes 24  -KP            User Key  (r) = Recorded By, (t) = Taken By, (c) = Cosigned By    Initials Name Provider Type     Beatriz Balderrama OTR Occupational Therapist              Therapy Charges for Today     Code Description Service Date Service Provider Modifiers Qty    04711247619 HC OT THER PROC EA 15 MIN 3/3/2022 Beatriz Balderrama OTR GO 1    [id number]  OT SELF CARE/MGMT/TRAIN EA 15 MIN 3/3/2022 Beatriz Balderrama OTR GO 1               [facility]  3/3/2022

## 2022-03-04 ENCOUNTER — HOME HEALTH ADMISSION (OUTPATIENT)
Dept: HOME HEALTH SERVICES | Facility: HOME HEALTHCARE | Age: 67
End: 2022-03-04

## 2022-03-04 LAB
ALBUMIN SERPL-MCNC: 3.1 G/DL (ref 3.5–5.2)
ANION GAP SERPL CALCULATED.3IONS-SCNC: 9 MMOL/L (ref 5–15)
BASOPHILS # BLD AUTO: 0.03 10*3/MM3 (ref 0–0.2)
BASOPHILS NFR BLD AUTO: 0.5 % (ref 0–1.5)
BUN SERPL-MCNC: 54 MG/DL (ref 8–23)
BUN/CREAT SERPL: 19.5 (ref 7–25)
CALCIUM SPEC-SCNC: 8.6 MG/DL (ref 8.6–10.5)
CHLORIDE SERPL-SCNC: 109 MMOL/L (ref 98–107)
CO2 SERPL-SCNC: 23 MMOL/L (ref 22–29)
CREAT SERPL-MCNC: 2.77 MG/DL (ref 0.76–1.27)
DEPRECATED RDW RBC AUTO: 44.4 FL (ref 37–54)
EGFRCR SERPLBLD CKD-EPI 2021: 24.4 ML/MIN/1.73
EOSINOPHIL # BLD AUTO: 0.23 10*3/MM3 (ref 0–0.4)
EOSINOPHIL NFR BLD AUTO: 3.7 % (ref 0.3–6.2)
ERYTHROCYTE [DISTWIDTH] IN BLOOD BY AUTOMATED COUNT: 14.5 % (ref 12.3–15.4)
GLUCOSE BLDC GLUCOMTR-MCNC: 206 MG/DL (ref 70–130)
GLUCOSE BLDC GLUCOMTR-MCNC: 238 MG/DL (ref 70–130)
GLUCOSE BLDC GLUCOMTR-MCNC: 269 MG/DL (ref 70–130)
GLUCOSE BLDC GLUCOMTR-MCNC: 58 MG/DL (ref 70–130)
GLUCOSE BLDC GLUCOMTR-MCNC: 96 MG/DL (ref 70–130)
GLUCOSE SERPL-MCNC: 60 MG/DL (ref 65–99)
HCT VFR BLD AUTO: 33.5 % (ref 37.5–51)
HGB BLD-MCNC: 10.6 G/DL (ref 13–17.7)
IMM GRANULOCYTES # BLD AUTO: 0.02 10*3/MM3 (ref 0–0.05)
IMM GRANULOCYTES NFR BLD AUTO: 0.3 % (ref 0–0.5)
LYMPHOCYTES # BLD AUTO: 1.17 10*3/MM3 (ref 0.7–3.1)
LYMPHOCYTES NFR BLD AUTO: 18.8 % (ref 19.6–45.3)
MCH RBC QN AUTO: 26.6 PG (ref 26.6–33)
MCHC RBC AUTO-ENTMCNC: 31.6 G/DL (ref 31.5–35.7)
MCV RBC AUTO: 84 FL (ref 79–97)
MONOCYTES # BLD AUTO: 0.64 10*3/MM3 (ref 0.1–0.9)
MONOCYTES NFR BLD AUTO: 10.3 % (ref 5–12)
NEUTROPHILS NFR BLD AUTO: 4.12 10*3/MM3 (ref 1.7–7)
NEUTROPHILS NFR BLD AUTO: 66.4 % (ref 42.7–76)
NRBC BLD AUTO-RTO: 0 /100 WBC (ref 0–0.2)
PHOSPHATE SERPL-MCNC: 3.8 MG/DL (ref 2.5–4.5)
PLATELET # BLD AUTO: 216 10*3/MM3 (ref 140–450)
PMV BLD AUTO: 10.7 FL (ref 6–12)
POTASSIUM SERPL-SCNC: 4.4 MMOL/L (ref 3.5–5.2)
RBC # BLD AUTO: 3.99 10*6/MM3 (ref 4.14–5.8)
SODIUM SERPL-SCNC: 141 MMOL/L (ref 136–145)
WBC NRBC COR # BLD: 6.21 10*3/MM3 (ref 3.4–10.8)

## 2022-03-04 PROCEDURE — 80069 RENAL FUNCTION PANEL: CPT | Performed by: INTERNAL MEDICINE

## 2022-03-04 PROCEDURE — 97530 THERAPEUTIC ACTIVITIES: CPT

## 2022-03-04 PROCEDURE — 82962 GLUCOSE BLOOD TEST: CPT

## 2022-03-04 PROCEDURE — 63710000001 INSULIN LISPRO (HUMAN) PER 5 UNITS: Performed by: HOSPITALIST

## 2022-03-04 PROCEDURE — 85025 COMPLETE CBC W/AUTO DIFF WBC: CPT | Performed by: HOSPITALIST

## 2022-03-04 RX ORDER — SODIUM CHLORIDE 9 MG/ML
100 INJECTION, SOLUTION INTRAVENOUS CONTINUOUS
Status: DISCONTINUED | OUTPATIENT
Start: 2022-03-04 | End: 2022-03-05

## 2022-03-04 RX ORDER — FAMOTIDINE 20 MG/1
20 TABLET, FILM COATED ORAL 2 TIMES DAILY
Status: DISCONTINUED | OUTPATIENT
Start: 2022-03-04 | End: 2022-03-08 | Stop reason: HOSPADM

## 2022-03-04 RX ADMIN — TAMSULOSIN HYDROCHLORIDE 0.4 MG: 0.4 CAPSULE ORAL at 20:11

## 2022-03-04 RX ADMIN — LEVOTHYROXINE SODIUM 75 MCG: 0.07 TABLET ORAL at 09:39

## 2022-03-04 RX ADMIN — BUPROPION HYDROCHLORIDE 300 MG: 300 TABLET, EXTENDED RELEASE ORAL at 08:05

## 2022-03-04 RX ADMIN — FAMOTIDINE 20 MG: 20 TABLET ORAL at 09:39

## 2022-03-04 RX ADMIN — Medication 1000 UNITS: at 09:39

## 2022-03-04 RX ADMIN — INSULIN LISPRO 4 UNITS: 100 INJECTION, SOLUTION INTRAVENOUS; SUBCUTANEOUS at 22:42

## 2022-03-04 RX ADMIN — CARVEDILOL 3.12 MG: 3.12 TABLET, FILM COATED ORAL at 18:00

## 2022-03-04 RX ADMIN — APIXABAN 2.5 MG: 2.5 TABLET, FILM COATED ORAL at 09:39

## 2022-03-04 RX ADMIN — FAMOTIDINE 20 MG: 20 TABLET ORAL at 20:11

## 2022-03-04 RX ADMIN — CARVEDILOL 3.12 MG: 3.12 TABLET, FILM COATED ORAL at 08:05

## 2022-03-04 RX ADMIN — INSULIN LISPRO 3 UNITS: 100 INJECTION, SOLUTION INTRAVENOUS; SUBCUTANEOUS at 11:54

## 2022-03-04 RX ADMIN — APIXABAN 2.5 MG: 2.5 TABLET, FILM COATED ORAL at 20:11

## 2022-03-04 RX ADMIN — SODIUM CHLORIDE 100 ML/HR: 9 INJECTION, SOLUTION INTRAVENOUS at 16:21

## 2022-03-04 RX ADMIN — ATORVASTATIN CALCIUM 10 MG: 10 TABLET, FILM COATED ORAL at 20:11

## 2022-03-04 RX ADMIN — ASPIRIN 81 MG: 81 TABLET, COATED ORAL at 09:39

## 2022-03-04 RX ADMIN — INSULIN LISPRO 3 UNITS: 100 INJECTION, SOLUTION INTRAVENOUS; SUBCUTANEOUS at 18:00

## 2022-03-04 NOTE — PLAN OF CARE
Problem: Adult Inpatient Plan of Care  Goal: Plan of Care Review  Outcome: Ongoing, Progressing  Flowsheets (Taken 3/4/2022 0630)  Outcome Summary: VSS. Pt denies pain. Pt had BM with prn medications given yesterday. Voiding without difficulty. Up with asst x1 falls precautions maintained. No complaints or concerns. Pt slept well through the night. Will continue to monitor.   Goal Outcome Evaluation:              Outcome Summary: VSS. Pt denies pain. Pt had BM with prn medications given yesterday. Voiding without difficulty. Up with asst x1 falls precautions maintained. No complaints or concerns. Pt slept well through the night. Will continue to monitor.

## 2022-03-04 NOTE — PROGRESS NOTES
[facility] Progress Note      Patient Name: Carlito Mo  : 1955  MRN: [id number]  Primary Care Physician:  Florencia Caballero MD  Date of admission: 3/1/2022    Subjective     Interval History:   Follow up SYLVAIN on CKD IV.     Patient is feeling much better, denies any chest pain or shortness of air, no orthopnea or PND, no nausea or vomiting, no diarrhea, no dysuria or gross hematuria, no lower extremity edema, he sat up in the chair yesterday he felt slightly lightheaded    Review of Systems:   As noted above    Objective     Vitals:   Temp:  [97.3 °F (36.3 °C)-98.1 °F (36.7 °C)] 97.4 °F (36.3 °C)  Heart Rate:  [63-72] 63  Resp:  [16-20] 18  BP: (147-176)/(77-88) 151/80    Intake/Output Summary (Last 24 hours) at 3/4/2022 1016  Last data filed at 3/4/2022 0333  Gross per 24 hour   Intake 220 ml   Output --   Net 220 ml       Physical Exam:    General Appearance: alert, oriented x 3, no acute distress . Chronically ill.   Skin: warm and dry  HEENT: oral mucosa normal, nonicteric sclera  Neck: supple, no JVD  Lungs: Clear to auscultation   Heart: RRR, normal S1 and S2. No rub .  Abdomen: soft, nontender, non-distended.  Normoactive bowels  Extremities: no edema.  Neuro: normal speech and mental status     Scheduled Meds:     apixaban, 2.5 mg, Oral, Q12H  aspirin, 81 mg, Oral, Daily  atorvastatin, 10 mg, Oral, Nightly  buPROPion XL, 300 mg, Oral, QAM  carvedilol, 3.125 mg, Oral, BID With Meals  cholecalciferol, 1,000 Units, Oral, Daily  famotidine, 20 mg, Oral, BID  insulin glargine, 10 Units, Subcutaneous, Nightly  insulin lispro, 0-7 Units, Subcutaneous, 4x Daily With Meals & Nightly  levothyroxine, 75 mcg, Oral, Daily  polyethylene glycol, 17 g, Oral, Daily  tamsulosin, 0.4 mg, Oral, Nightly      IV Meds:        Results Reviewed:   I have personally reviewed the results from the time of this admission to 3/4/2022 10:16 EST     Results from last 7 days   Lab Units 22  0543  03/03/22 0425 03/02/22 0622 03/01/22 0402 03/01/22  0402   SODIUM mmol/L 141 142 140   < > 138   POTASSIUM mmol/L 4.4 4.4 3.9   < > 4.1   CHLORIDE mmol/L 109* 109* 107   < > 105   CO2 mmol/L 23.0 23.0 20.6*   < > 18.7*   BUN mg/dL 54* 57* 68*   < > 68*   CREATININE mg/dL 2.77* 3.23* 3.88*   < > 4.31*   CALCIUM mg/dL 8.6 8.4* 9.2   < > 8.8   BILIRUBIN mg/dL  --   --  0.2  --  0.2   ALK PHOS U/L  --   --  89  --  97   ALT (SGPT) U/L  --   --  49*  --  60*   AST (SGOT) U/L  --   --  22  --  33   GLUCOSE mg/dL 60* 107* 81   < > 242*    < > = values in this interval not displayed.       Estimated Creatinine Clearance: 28.1 mL/min (A) (by C-G formula based on SCr of 2.77 mg/dL (H)).    Results from last 7 days   Lab Units 03/04/22  0543 03/03/22 0425 03/02/22 0622   MAGNESIUM mg/dL  --   --  2.0   PHOSPHORUS mg/dL 3.8 4.3  --        Results from last 7 days   Lab Units 03/02/22 0622   URIC ACID mg/dL 9.3*       Results from last 7 days   Lab Units 03/04/22 0543 03/03/22 0425 03/02/22 0622 03/01/22  0402   WBC 10*3/mm3 6.21 5.47 6.68 8.30   HEMOGLOBIN g/dL 10.6* 10.4* 11.5* 11.9*   PLATELETS 10*3/mm3 216 224 250 240       Results from last 7 days   Lab Units 03/01/22  0402   INR  1.07       Assessment / Plan     ASSESSMENT:  1.  SYLVAIN on CKD IV> Baseline crt in 3's.  Volume depletion due to diuresis. Improved with IVF.,  Creatinine down to 2.77, electrolyte within acceptable range  2. Chronic combined systolic and diastolic heart failure. Compensated.  3. DM2  4. History of CVA  5. CAD sp CABG.   6.  Anemia of chronic kidney disease, hemoglobin today 10.6, no HAI is indicated yet    PLAN:  1.  Continue the same treatment  2.  Will resume his torsemide, 40 mg  3.  Check orthostatic blood pressures  4.  Surveillance labs      Cameron Olguin MD  03/04/22  10:16 [facility]    [facility]""  [phone]

## 2022-03-04 NOTE — PLAN OF CARE
Goal Outcome Evaluation:         No major issues over shift, pt alert and oriented, VS stable though pt's orthostatics positive, 2 L of NS ordered for pt, 1st bag now infusing, no complaints of pain, will continue to monitor

## 2022-03-04 NOTE — PROGRESS NOTES
The patient tolerated the increased dose of Wellbutrin XL without complaint and offers no new complaints when seen today.  Charting indicates that he is scheduled for probable discharge later today.  I will ask the access center staff to see the patient prior to discharge regarding arrangements for possible post discharge psychiatric follow-up

## 2022-03-04 NOTE — PLAN OF CARE
Goal Outcome Evaluation:  Plan of Care Reviewed With: patient        Progress: improving  Outcome Summary: Patient is agreeable to PT with motivation. He completed supine to sitting EOB with SBA. He completed STSx2 with CGA and ambulated 10ft without use of AD requiring Shakeel. He was very unsteady on feet during ambulation and reported lightheaded/dizziness. Orthostatics taken prior to ambulation. See charting for more details. He completed static standing 5min with CGA without UE support.  Pt will continue to benefit from skilled PT and may need SNF at discharge. Pt agreeable to SNF at discharge.

## 2022-03-04 NOTE — PROGRESS NOTES
"Daily progress note    Chief complaint  Doing same  No new complaints  Still dizzy with change of position  Denies chest pain shortness of breath palpitation     History of present illness  66-year-old white male who is well-known to our service with history of diabetes hypertension hyperlipidemia and chronic kidney disease stage IV presented to [facility], [place] emergency room with generalized weakness and he fell at home.  Patient denies any injury.  Patient does have dizziness lightheadedness but denies any chest pain increase shortness of breath abdominal pain nausea vomiting diarrhea.  Patient work-up in ER revealed acute kidney injury on top of chronic kidney disease stage IV admit for management.  Patient has chronic elevated troponin with no chest pain.     REVIEW OF SYSTEMS  Unremarkable except generalized weakness     PHYSICAL EXAM  Blood pressure 139/65, pulse 64, temperature 97.4 °F (36.3 °C), temperature source Oral, resp. rate 18, height 182.9 cm (72\"), weight 75.8 kg (167 lb 1.6 oz), SpO2 99 %.    Constitutional:       General: He is not in acute distress.  HENT:      Head: Normocephalic and atraumatic.   Eyes:      Pupils: Pupils are equal, round, and reactive to light.   Cardiovascular:      Rate and Rhythm: Normal rate and regular rhythm.      Heart sounds: Normal heart sounds.   Pulmonary:      Effort: Pulmonary effort is normal. No respiratory distress.      Breath sounds: Normal breath sounds.   Abdominal:      Palpations: Abdomen is soft.      Tenderness: There is no abdominal tenderness. There is no guarding or rebound.   Musculoskeletal:         General: Normal range of motion.      Cervical back: Normal range of motion and neck supple.   Skin:     General: Skin is warm and dry.   Neurological:      Mental Status: He is alert and oriented to person, place, and time.      Sensory: Sensation is intact.   Psychiatric:         Mood and Affect: Mood and affect normal.      LAB RESULTS  Lab " Results (last 24 hours)     Procedure Component Value Units Date/Time    POC Glucose Once [[id number]]  (Abnormal) Collected: 03/04/22 1111    Specimen: Blood Updated: 03/04/22 1113     Glucose 238 mg/dL      Comment: Meter: [id number] : [id number] Frank RAMIREZ       Renal Function Panel [[id number]]  (Abnormal) Collected: 03/04/22 0543    Specimen: Blood Updated: 03/04/22 0715     Glucose 60 mg/dL      BUN 54 mg/dL      Creatinine 2.77 mg/dL      Sodium 141 mmol/L      Potassium 4.4 mmol/L      Chloride 109 mmol/L      CO2 23.0 mmol/L      Calcium 8.6 mg/dL      Albumin 3.10 g/dL      Phosphorus 3.8 mg/dL      Anion Gap 9.0 mmol/L      BUN/Creatinine Ratio 19.5     eGFR 24.4 mL/min/1.73      Comment: National Kidney Foundation and American Society of Nephrology (ASN) Task Force recommended calculation based on the Chronic Kidney Disease Epidemiology Collaboration (CKD-EPI) equation refit without adjustment for race.       Narrative:      GFR Normal >60  Chronic Kidney Disease <60  Kidney Failure <15      CBC & Differential [[id number]]  (Abnormal) Collected: 03/04/22 0543    Specimen: Blood Updated: 03/04/22 0649    Narrative:      The following orders were created for panel order CBC & Differential.  Procedure                               Abnormality         Status                     ---------                               -----------         ------                     CBC Auto Differential[[id number]]        Abnormal            Final result                 Please view results for these tests on the individual orders.    CBC Auto Differential [[id number]]  (Abnormal) Collected: 03/04/22 0543    Specimen: Blood Updated: 03/04/22 0649     WBC 6.21 10*3/mm3      RBC 3.99 10*6/mm3      Hemoglobin 10.6 g/dL      Hematocrit 33.5 %      MCV 84.0 fL      MCH 26.6 pg      MCHC 31.6 g/dL      RDW 14.5 %      RDW-SD 44.4 fl      MPV 10.7 fL      Platelets 216 10*3/mm3      Neutrophil % 66.4 %      Lymphocyte % 18.8 %       Monocyte % 10.3 %      Eosinophil % 3.7 %      Basophil % 0.5 %      Immature Grans % 0.3 %      Neutrophils, Absolute 4.12 10*3/mm3      Lymphocytes, Absolute 1.17 10*3/mm3      Monocytes, Absolute 0.64 10*3/mm3      Eosinophils, Absolute 0.23 10*3/mm3      Basophils, Absolute 0.03 10*3/mm3      Immature Grans, Absolute 0.02 10*3/mm3      nRBC 0.0 /100 WBC     POC Glucose Once [[id number]]  (Normal) Collected: 03/04/22 0619    Specimen: Blood Updated: 03/04/22 0621     Glucose 96 mg/dL      Comment: Meter: [id number] : [id number] Fender Diamond NA       POC Glucose Once [[id number]]  (Abnormal) Collected: 03/04/22 0558    Specimen: Blood Updated: 03/04/22 0559     Glucose 58 mg/dL      Comment: Meter: [id number] : [id number] Fender Diamond NA       POC Glucose Once [[id number]]  (Abnormal) Collected: 03/03/22 2037    Specimen: Blood Updated: 03/03/22 2038     Glucose 225 mg/dL      Comment: Meter: [id number] : [id number] Fender Diamond NA       POC Glucose Once [[id number]]  (Abnormal) Collected: 03/03/22 1645    Specimen: Blood Updated: 03/03/22 1646     Glucose 189 mg/dL      Comment: Meter: [id number] : [id number] Frank RAMIREZ           Imaging Results (Last 24 Hours)     ** No results found for the last 24 hours. **             ECG 12 Lead  Component   Ref Range & Units 3/1/22 0256 2/3/22 0604 2/2/22 0342 2/1/22 1535 12/15/21 1248 5/20/21 2206   QT Interval   ms 482 P  488  482  491  511  469              HEART RATE= 61  bpm  RR Interval= 988  ms  LA Interval= 68  ms  P Horizontal Axis= 2  deg  P Front Axis= -55  deg  QRSD Interval= 172  ms  QT Interval= 482  ms  QRS Axis= -86  deg  T Wave Axis= 86  deg  - ABNORMAL ECG -  Sinus or ectopic atrial rhythm  Short LA interval  Right bundle branch block  Anteroseptal infarct, age indeterminate             Current Facility-Administered Medications:   •  apixaban (ELIQUIS) tablet 2.5 mg, 2.5 mg, Oral, Q12H, Josue Pickens MD, 2.5 mg at 03/04/22 0939  •   aspirin EC tablet 81 mg, 81 mg, Oral, Daily, Josue Pickens MD, 81 mg at 03/04/22 0939  •  atorvastatin (LIPITOR) tablet 10 mg, 10 mg, Oral, Nightly, Josue Pickens MD, 10 mg at 03/03/22 2209  •  buPROPion XL (WELLBUTRIN XL) 24 hr tablet 300 mg, 300 mg, Oral, Griselda MUIR Charles Brook III, MD, 300 mg at 03/04/22 0805  •  carvedilol (COREG) tablet 3.125 mg, 3.125 mg, Oral, BID With Meals, Josue Pickens MD, 3.125 mg at 03/04/22 0805  •  cholecalciferol (VITAMIN D3) tablet 1,000 Units, 1,000 Units, Oral, Daily, Josue Pickens MD, 1,000 Units at 03/04/22 0939  •  famotidine (PEPCID) tablet 20 mg, 20 mg, Oral, BID, Josue Pickens MD, 20 mg at 03/04/22 0939  •  insulin glargine (LANTUS, SEMGLEE) injection 10 Units, 10 Units, Subcutaneous, Nightly, Josue Pickens MD, 10 Units at 03/03/22 2210  •  insulin lispro (ADMELOG) injection 0-7 Units, 0-7 Units, Subcutaneous, 4x Daily With Meals & Nightly, Josue Pickens MD, 3 Units at 03/04/22 1154  •  levothyroxine (SYNTHROID, LEVOTHROID) tablet 75 mcg, 75 mcg, Oral, Daily, Josue Pickens MD, 75 mcg at 03/04/22 0939  •  polyethylene glycol (MIRALAX) packet 17 g, 17 g, Oral, Daily, Zahida Hsu MD, 17 g at 03/03/22 1344  •  [COMPLETED] Insert peripheral IV, , , Once **AND** sodium chloride 0.9 % flush 10 mL, 10 mL, Intravenous, PRN, Sergio Burgess MD  •  tamsulosin (FLOMAX) 24 hr capsule 0.4 mg, 0.4 mg, Oral, Nightly, Josue Pickens MD, 0.4 mg at 03/03/22 2209  •  zolpidem (AMBIEN) tablet 5 mg, 5 mg, Oral, Nightly PRN, Bob Villalobos III, MD, 5 mg at 03/02/22 2123    ASSESSMENT  Acute kidney injury   Chronic kidney disease stage IV  Elevated troponin with no chest pain  Diabetes mellitus  Hypertension with low BP  Hyperlipidemia  Gastroesophageal reflux disease    PLAN  CPM  OFF IVF  Resume diuretics in a.m.  Nephrology consult appreciated  Cardiology and psychiatry to follow patient  Adjust home medications  Stress ulcer DVT prophylaxis  Supportive care    PT/OT  Discussed with nursing staff   Discharge planning    ORA MCDONALD MD

## 2022-03-04 NOTE — CASE MANAGEMENT/SOCIAL WORK
Continued Stay Note  [facility]     Patient Name: Carlito Mo  MRN: [id number]  Today's Date: 3/4/2022    Admit Date: 3/1/2022     Discharge Plan     Row Name 03/04/22 1620       Plan    Plan Home with spouse and [facility] vs SNF (referrals pending)    Patient/Family in Agreement with Plan yes    Plan Comments Met with pt at bedside. Pt states he wants to go to rehab prior to returning home. Requests referral to #1 Terry [facility]ner, #2 Rene Garner. Referrals placed and notified [facility]. Back up choices also placed. Dragan OBRIEN RN-BC               Discharge Codes    No documentation.               Expected Discharge Date and Time     Expected Discharge Date Expected Discharge Time    Mar 4, 2022             Dragan Delarosa RN

## 2022-03-04 NOTE — THERAPY TREATMENT NOTE
Patient Name: Carlito Mo  : 1955    MRN: [id number]                              Today's Date: 3/4/2022       Admit Date: 3/1/2022    Visit Dx:     ICD-10-CM ICD-9-CM   1. Generalized weakness  R53.1 780.79   2. Acute on chronic renal insufficiency  N28.9 593.9    N18.9 585.9   3. Fall, initial encounter  W19.XXXA E888.9   4. Elevated troponin  R77.8 790.6     Patient Active Problem List   Diagnosis   • Major depressive disorder with single episode, in partial remission ([facility])   • Other hyperlipidemia   • Vitamin D deficiency   • Erectile dysfunction   • Chronic fatigue   • Type 2 diabetes mellitus with ophthalmic complication ([facility])   • Skin lesion of chest wall   • Slow transit constipation   • Benign prostatic hyperplasia with nocturia   • History of basal cell carcinoma   • High blood pressure associated with diabetes ([facility])   • Acquired hypothyroidism   • Hypertensive urgency   • Recurrent strokes ([facility])   • Urinary retention   • Pneumonia   • Acute on chronic combined systolic and diastolic congestive heart failure ([facility])   • Acute respiratory failure with hypoxia ([facility])   • Suspected sleep apnea   • Pleural effusion   • YAW (obstructive sleep apnea)   • Coronary artery disease involving native coronary artery of native heart without angina pectoris   • Chronically elevated troponin   • Colon cancer screening   • Enlarged lymph nodes   • Functional gait abnormality   • History of myocardial infarction   • Hospital discharge follow-up   • Insomnia   • Iron deficiency anemia   • Major depression, recurrent ([facility])   • Anemia, chronic renal failure, stage 3 (moderate) ([facility])   • Essential hypertension   • Adverse effect of iron and its compounds, initial encounter   • Acute on chronic renal insufficiency   • Debility   • Falls frequently   • Acute pain of right shoulder   • Acute on chronic anemia   • Heme positive stool   • Neurogenic orthostatic hypotension ([facility])   • Symptomatic anemia   • History of colon  polyps   • Helicobacter pylori gastritis   • Esophagitis   • Sleep related hypoxia   • Embolic stroke (HCC)   • Patent foramen ovale   • Pleural effusion, bilateral   • Cardiomyopathy (HCC)   • Lower abdominal pain   • Diarrhea   • CKD (chronic kidney disease) stage 4, GFR 15-29 ml/min (HCC)   • Hypertension not at goal   • Memory loss due to medical condition   • Generalized weakness     Past Medical History:   Diagnosis Date   • Acquired hypothyroidism    • Acute congestive heart failure (HCC)    • Acute respiratory failure with hypoxia (HCC)    • Acute sinusitis    • SYLVAIN (acute kidney injury) (HCC)     on CKD   • Anemia    • Arthritis    • CAD (coronary artery disease)    • Cancer (HCC)     skin   • Chronic combined systolic and diastolic congestive heart failure (HCC)    • Chronic ischemic heart disease    • CKD (chronic kidney disease)    • COPD (chronic obstructive pulmonary disease) (HCC)    • Depression    • Diabetes mellitus type 2, uncontrolled, without complications    • Disease of thyroid gland    • Elevated cholesterol    • Fatigue    • Frequent PVCs    • GERD (gastroesophageal reflux disease)    • Heart attack (HCC)    • History of coronary artery disease     with remote history of bypass surgery in 2001   • History of transfusion    • Hyperglycemia    • Hyperlipidemia    • Hypertension    • Hypertensive encephalopathy    • Mental status change     Acute   • YAW on CPAP    • Pleural effusion    • Pneumonia    • RBBB (right bundle branch block)    • Screening for prostate cancer 2011   • Stroke (HCC)    • TIA (transient ischemic attack)    • Type 2 diabetes mellitus (HCC)    • Urinary retention    • Vitamin D deficiency      Past Surgical History:   Procedure Laterality Date   • APPENDECTOMY N/A 02/14/2016    Dr. Alexey Dodson   • COLONOSCOPY      over 20 years ago.  no polyps    • COLONOSCOPY N/A 9/18/2018    Procedure: COLONOSCOPY;  Surgeon: Jose Enrique Louie MD;  Location: [facility] ENDOSCOPY;   Service: Gastroenterology   • COLONOSCOPY N/A 9/19/2018    IH, EH, polyps (TAs w/low grade dysplasia)   • COLONOSCOPY N/A 6/1/2020    Procedure: COLONOSCOPY;  Surgeon: Jose Enrique Louie MD;  Location: [facility] ENDOSCOPY;  Service: Gastroenterology;  Laterality: N/A;  Pre op-Anemia, Melena, History of Polyps  Post op-To Cecum/TI, Polyp, Poor Prep   • CORONARY ARTERY BYPASS GRAFT  11/2001   • ENDOSCOPY N/A 5/29/2020    Procedure: ESOPHAGOGASTRODUODENOSCOPY with biopsies;  Surgeon: Amanda Lovelace MD;  Location: [facility] ENDOSCOPY;  Service: Gastroenterology;  Laterality: N/A;  pre-anemia, dark stools  post-esophagitis, hiatal hernia   • ENDOSCOPY N/A 9/15/2020    Procedure: ESOPHAGOGASTRODUODENOSCOPY WITH BIOPSIES;  Surgeon: Jose Enrique Louie MD;  Location: [facility] ENDOSCOPY;  Service: Gastroenterology;  Laterality: N/A;  pre: history of melena and esophagitis  post: mild esophagitis and gastritis, small hiatal hernia   • HERNIA REPAIR Left 12/05/2019   • TONSILLECTOMY     • VASECTOMY        General Information     Row Name 03/04/22 2504          Physical Therapy Time and Intention    Document Type therapy note (daily note)  -CB     Mode of Treatment individual therapy; physical therapy  -CB     Row Name 03/04/22 8388          General Information    Patient Profile Reviewed yes  -CB     Existing Precautions/Restrictions fall; other (see comments)  orthostatic BP taken  -CB     Row Name 03/04/22 1155          Cognition    Orientation Status (Cognition) oriented x 4  -CB     Row Name 03/04/22 1444          Safety Issues, Functional Mobility    Safety Issues Affecting Function (Mobility) safety precautions follow-through/compliance; insight into deficits/self-awareness; awareness of need for assistance; safety precaution awareness  -CB     Impairments Affecting Function (Mobility) balance; strength; endurance/activity tolerance  -CB     Comment, Safety Issues/Impairments (Mobility) gait belt and non skid socks  -CB            User Key  (r) = Recorded By, (t) = Taken By, (c) = Cosigned By    Initials Name Provider Type    Nikky Roth, DENISSE Physical Therapist               Mobility     Row Name 03/04/22 1156          Bed Mobility    Bed Mobility supine-sit  -CB     Supine-Sit Florence (Bed Mobility) standby assist; verbal cues  -CB     Row Name 03/04/22 1156          Sit-Stand Transfer    Sit-Stand [place] (Transfers) contact guard; verbal cues  -CB     Assistive Device (Sit-Stand Transfers) --  no AD  -CB     Row Name 03/04/22 1156          Gait/Stairs (Locomotion)    Florence Level (Gait) minimum assist (75% patient effort); verbal cues  -CB     Assistive Device (Gait) --  no AD  -CB     Distance in Feet (Gait) 10ft  -CB     Deviations/Abnormal Patterns (Gait) gait speed decreased; stride length decreased; antalgic; base of support, narrow  -CB     Bilateral Gait Deviations forward flexed posture  -CB     Comment (Gait/Stairs) pt wanted to attempt ambulation without use of AD; very unsteady on feet during gait  -CB           User Key  (r) = Recorded By, (t) = Taken By, (c) = Cosigned By    Initials Name Provider Type    Nikky Roth PT Physical Therapist               Obj/Interventions     Row Name 03/04/22 1157          Balance    Balance Assessment sitting static balance; sitting dynamic balance; standing static balance; standing dynamic balance  -CB     Static Sitting Balance WFL; unsupported; sitting, edge of bed  -CB     Dynamic Sitting Balance WFL; unsupported; sitting, edge of bed  -CB     Static Standing Balance mild impairment; standing; unsupported  -CB     Dynamic Standing Balance mild impairment; standing; unsupported  -CB     Balance Interventions sitting; standing; sit to stand; supported; static; dynamic; minimal challenge  -CB     Comment, Balance static standing 5min without UE support and CGA  -CB           User Key  (r) = Recorded By, (t) = Taken By, (c) = Cosigned By    Initials Name  Provider Type    Nikky Roth, PT Physical Therapist               Goals/Plan    No documentation.                Clinical Impression     Row Name 03/04/22 1158          Pain    Additional Documentation Pain Scale: Numbers Pre/Post-Treatment (Group)  -CB     Row Name 03/04/22 1158          Pain Scale: Numbers Pre/Post-Treatment    Pretreatment Pain Rating 0/10 - no pain  -CB     Posttreatment Pain Rating 0/10 - no pain  -CB     Row Name 03/04/22 1158          Plan of Care Review    Plan of Care Reviewed With patient  -CB     Progress improving  -CB     Outcome Summary Patient is agreeable to PT with motivation. He completed supine to sitting EOB with SBA. He completed STSx2 with CGA and ambulated 10ft without use of AD requiring Shakeel. He was very unsteady on feet during ambulation and reported lightheaded/dizziness. Orthostatics taken prior to ambulation. See charting for more details. He completed static standing 5min with CGA without UE support.  Pt will continue to benefit from skilled PT and may need SNF at discharge. Pt agreeable to SNF at discharge.  -CB     Row Name 03/04/22 1158          Vital Signs    Pre Systolic BP Rehab 115  supin e  -CB     Pre Treatment Diastolic BP 58  -CB     Intra Systolic BP Rehab 104  standing 1min  -CB     Intra Treatment Diastolic BP 60  -CB     Post Systolic BP Rehab 107  standing 3min  -CB     Post Treatment Diastolic BP 61  -CB     Row Name 03/04/22 1158          Positioning and Restraints    Pre-Treatment Position in bed  -CB     Post Treatment Position chair  -CB     In Chair notified nsg; reclined; call light within reach; encouraged to call for assist; exit alarm on; legs elevated  -CB           User Key  (r) = Recorded By, (t) = Taken By, (c) = Cosigned By    Initials Name Provider Type    Nikky Roth, PT Physical Therapist               Outcome Measures     Row Name 03/04/22 1203          How much help from another person do you currently need...    Turning  from your back to your side while in flat bed without using bedrails? 3  -CB     Moving from lying on back to sitting on the side of a flat bed without bedrails? 3  -CB     Moving to and from a bed to a chair (including a wheelchair)? 3  -CB     Standing up from a chair using your arms (e.g., wheelchair, bedside chair)? 3  -CB     Climbing 3-5 steps with a railing? 2  -CB     To walk in hospital room? 3  -CB     AM-PAC 6 Clicks Score (PT) 17  -CB     Row Name 03/04/22 1203          Functional Assessment    Outcome Measure Options AM-PAC 6 Clicks Basic Mobility (PT)  -CB           User Key  (r) = Recorded By, (t) = Taken By, (c) = Cosigned By    Initials Name Provider Type    CB Nikky Katz PT Physical Therapist                             Physical Therapy Education                 Title: PT OT SLP Therapies (In Progress)     Topic: Physical Therapy (In Progress)     Point: Mobility training (Done)     Learning Progress Summary           Patient Acceptance, E,TB, VU,NR by CB at 3/4/2022 1204                   Point: Home exercise program (Not Started)     Learner Progress:  Not documented in this visit.          Point: Body mechanics (Not Started)     Learner Progress:  Not documented in this visit.          Point: Precautions (Not Started)     Learner Progress:  Not documented in this visit.                      User Key     Initials Effective Dates Name Provider Type Discipline     10/22/21 -  Nikky Katz PT Physical Therapist PT              PT Recommendation and Plan     Plan of Care Reviewed With: patient  Progress: improving  Outcome Summary: Patient is agreeable to PT with motivation. He completed supine to sitting EOB with SBA. He completed STSx2 with CGA and ambulated 10ft without use of AD requiring Shakeel. He was very unsteady on feet during ambulation and reported lightheaded/dizziness. Orthostatics taken prior to ambulation. See charting for more details. He completed static standing 5min with  CGA without UE support.  Pt will continue to benefit from skilled PT and may need SNF at discharge. Pt agreeable to SNF at discharge.     Time Calculation:    PT Charges     Row Name 03/04/22 1204             Time Calculation    Start Time 1109  -CB      Stop Time 1124  -CB      Time Calculation (min) 15 min  -CB      PT Received On 03/04/22  -CB      PT - Next Appointment 03/05/22  -CB              Time Calculation- PT    Total Timed Code Minutes- PT 15 minute(s)  -CB              Timed Charges    76371 - PT Therapeutic Activity Minutes 15  -CB              Total Minutes    Timed Charges Total Minutes 15  -CB       Total Minutes 15  -CB            User Key  (r) = Recorded By, (t) = Taken By, (c) = Cosigned By    Initials Name Provider Type    CB Nikky Katz, PT Physical Therapist              Therapy Charges for Today     Code Description Service Date Service Provider Modifiers Qty    [id number]  PT THERAPEUTIC ACT EA 15 MIN 3/4/2022 Nikky Katz, PT GP 1          PT G-Codes  Outcome Measure Options: AM-PAC 6 Clicks Basic Mobility (PT)  AM-PAC 6 Clicks Score (PT): 17  AM-PAC 6 Clicks Score (OT): 17    Nikky Katz PT  3/4/2022

## 2022-03-04 NOTE — DISCHARGE PLACEMENT REQUEST
"Carlito Mo (66 y.o. Male)             Date of Birth Social Security Number Address Home Phone MRN    1955  [id number] [address] [phone] [id number]    [facility] Mar[employer] Status             [employer]        Admission Date Admission Type Admitting Provider Attending Provider Department, Room/Bed    3/1/22 Emergency Josue Pickens MD Ahmed, Aftab, MD [place] [address] [address], [address]1    Discharge Date Discharge Disposition Discharge Destination                         Attending Provider: Josue Pickens MD    Allergies: Prozac [Fluoxetine Hcl]    Isolation: None   Infection: None   Code Status: CPR   Advance Care Planning Activity    Ht: 182.9 cm (72\")   Wt: 75.8 kg (167 lb 1.6 oz)    Admission Cmt: None   Principal Problem: Generalized weakness [R53.1]                 Active Insurance as of 3/1/2022     Primary Coverage     Payor Plan Insurance Group Employer/Plan Group    MEDICARE MEDICARE A & B      Payor Plan Address Payor Plan Phone Number Payor Plan Fax Number Effective Dates    [address] [phone]  5/1/2020 - None Entered    [facility] [id number]       Subscriber Name Subscriber Birth Date Member ID       CARLITO [place] 1955 [id number]           Secondary Coverage     Payor Plan Insurance Group Employer/Plan Group    [place]      Payor Plan Address Payor Plan Phone Number Payor Plan Fax Number Effective Dates    [address] [phone]  2/1/2022 - None Entered    [place] [address]       Subscriber Name Subscriber Birth Date Member ID       CARLITO [place] 1955 [id number]                 Emergency Contacts      (Rel.) Home Phone Work Phone Mobile Phone    Lissette Mo (Spouse) [phone] -- --              "

## 2022-03-05 LAB
ALBUMIN SERPL-MCNC: 3 G/DL (ref 3.5–5.2)
ANION GAP SERPL CALCULATED.3IONS-SCNC: 7 MMOL/L (ref 5–15)
BUN SERPL-MCNC: 48 MG/DL (ref 8–23)
BUN/CREAT SERPL: 17.7 (ref 7–25)
CALCIUM SPEC-SCNC: 8.2 MG/DL (ref 8.6–10.5)
CHLORIDE SERPL-SCNC: 109 MMOL/L (ref 98–107)
CO2 SERPL-SCNC: 24 MMOL/L (ref 22–29)
CREAT SERPL-MCNC: 2.71 MG/DL (ref 0.76–1.27)
EGFRCR SERPLBLD CKD-EPI 2021: 25.1 ML/MIN/1.73
GLUCOSE BLDC GLUCOMTR-MCNC: 109 MG/DL (ref 70–130)
GLUCOSE BLDC GLUCOMTR-MCNC: 178 MG/DL (ref 70–130)
GLUCOSE BLDC GLUCOMTR-MCNC: 183 MG/DL (ref 70–130)
GLUCOSE BLDC GLUCOMTR-MCNC: 196 MG/DL (ref 70–130)
GLUCOSE SERPL-MCNC: 105 MG/DL (ref 65–99)
MAGNESIUM SERPL-MCNC: 1.8 MG/DL (ref 1.6–2.4)
PHOSPHATE SERPL-MCNC: 3.6 MG/DL (ref 2.5–4.5)
POTASSIUM SERPL-SCNC: 4.6 MMOL/L (ref 3.5–5.2)
SODIUM SERPL-SCNC: 140 MMOL/L (ref 136–145)
URATE SERPL-MCNC: 5.7 MG/DL (ref 3.4–7)

## 2022-03-05 PROCEDURE — 83735 ASSAY OF MAGNESIUM: CPT | Performed by: INTERNAL MEDICINE

## 2022-03-05 PROCEDURE — 82962 GLUCOSE BLOOD TEST: CPT

## 2022-03-05 PROCEDURE — 80069 RENAL FUNCTION PANEL: CPT | Performed by: INTERNAL MEDICINE

## 2022-03-05 PROCEDURE — 84550 ASSAY OF BLOOD/URIC ACID: CPT | Performed by: INTERNAL MEDICINE

## 2022-03-05 PROCEDURE — 63710000001 INSULIN LISPRO (HUMAN) PER 5 UNITS: Performed by: HOSPITALIST

## 2022-03-05 RX ORDER — TORSEMIDE 20 MG/1
20 TABLET ORAL DAILY
Status: DISCONTINUED | OUTPATIENT
Start: 2022-03-06 | End: 2022-03-07

## 2022-03-05 RX ADMIN — CARVEDILOL 3.12 MG: 3.12 TABLET, FILM COATED ORAL at 08:33

## 2022-03-05 RX ADMIN — APIXABAN 2.5 MG: 2.5 TABLET, FILM COATED ORAL at 19:43

## 2022-03-05 RX ADMIN — LEVOTHYROXINE SODIUM 75 MCG: 0.07 TABLET ORAL at 08:33

## 2022-03-05 RX ADMIN — BUPROPION HYDROCHLORIDE 300 MG: 300 TABLET, EXTENDED RELEASE ORAL at 08:33

## 2022-03-05 RX ADMIN — INSULIN LISPRO 2 UNITS: 100 INJECTION, SOLUTION INTRAVENOUS; SUBCUTANEOUS at 21:33

## 2022-03-05 RX ADMIN — CARVEDILOL 3.12 MG: 3.12 TABLET, FILM COATED ORAL at 17:28

## 2022-03-05 RX ADMIN — TAMSULOSIN HYDROCHLORIDE 0.4 MG: 0.4 CAPSULE ORAL at 19:42

## 2022-03-05 RX ADMIN — ATORVASTATIN CALCIUM 10 MG: 10 TABLET, FILM COATED ORAL at 19:43

## 2022-03-05 RX ADMIN — POLYETHYLENE GLYCOL 3350 17 G: 17 POWDER, FOR SOLUTION ORAL at 08:33

## 2022-03-05 RX ADMIN — FAMOTIDINE 20 MG: 20 TABLET ORAL at 19:43

## 2022-03-05 RX ADMIN — Medication 1000 UNITS: at 08:33

## 2022-03-05 RX ADMIN — ASPIRIN 81 MG: 81 TABLET, COATED ORAL at 08:33

## 2022-03-05 RX ADMIN — APIXABAN 2.5 MG: 2.5 TABLET, FILM COATED ORAL at 08:33

## 2022-03-05 RX ADMIN — FAMOTIDINE 20 MG: 20 TABLET ORAL at 08:34

## 2022-03-05 RX ADMIN — INSULIN LISPRO 2 UNITS: 100 INJECTION, SOLUTION INTRAVENOUS; SUBCUTANEOUS at 12:30

## 2022-03-05 RX ADMIN — INSULIN LISPRO 2 UNITS: 100 INJECTION, SOLUTION INTRAVENOUS; SUBCUTANEOUS at 17:28

## 2022-03-05 NOTE — NURSING NOTE
Patient is requesting to go to rehab prior to returning home. Case management is working with patient and has sent referrals to multiple places.     Provided patient with multiple outpatient psychiatric referrals.     Patient appears weak. C/o being woken up multiple times last night including for a weight early this morning. Explained to patient that staff attempts to complete multiple tasks at once so not to constantly disturb patients. Patient also c/o his feet hanging over bottom of bed. Spoke with staff re patient's request for bed extender.

## 2022-03-05 NOTE — PLAN OF CARE
Problem: Adult Inpatient Plan of Care  Goal: Plan of Care Review  Outcome: Ongoing, Progressing  Flowsheets (Taken 3/5/2022 1831)  Progress: improving  Plan of Care Reviewed With: patient  Outcome Evaluation: Pt vitals stable. No c/o pain. IVF stopped. Tosemide to start tomorrow. Labs in am. WIll continue to monitor   Goal Outcome Evaluation:  Plan of Care Reviewed With: patient        Progress: improving  Outcome Evaluation: Pt vitals stable. No c/o pain. IVF stopped. Tosemide to start tomorrow. Labs in am. WIll continue to monitor

## 2022-03-05 NOTE — PROGRESS NOTES
"Daily progress note    Chief complaint  Doing same  No new complaints  Still dizzy with change of position  Denies chest pain shortness of breath palpitation     History of present illness  66-year-old white male who is well-known to our service with history of diabetes hypertension hyperlipidemia and chronic kidney disease stage IV presented to [facility], operated by [facility] emergency room with generalized weakness and he fell at home.  Patient denies any injury.  Patient does have dizziness lightheadedness but denies any chest pain increase shortness of breath abdominal pain nausea vomiting diarrhea.  Patient work-up in ER revealed acute kidney injury on top of chronic kidney disease stage IV admit for management.  Patient has chronic elevated troponin with no chest pain.     REVIEW OF SYSTEMS  Unremarkable except generalized weakness     PHYSICAL EXAM  Blood pressure 161/79, pulse 72, temperature 97.6 °F (36.4 °C), temperature source Oral, resp. rate 18, height 182.9 cm (72\"), weight 78.3 kg (172 lb 9.6 oz), SpO2 96 %.    Constitutional:       General: He is not in acute distress.  HENT:      Head: Normocephalic and atraumatic.   Eyes:      Pupils: Pupils are equal, round, and reactive to light.   Cardiovascular:      Rate and Rhythm: Normal rate and regular rhythm.      Heart sounds: Normal heart sounds.   Pulmonary:      Effort: Pulmonary effort is normal. No respiratory distress.      Breath sounds: Normal breath sounds.   Abdominal:      Palpations: Abdomen is soft.      Tenderness: There is no abdominal tenderness. There is no guarding or rebound.   Musculoskeletal:         General: Normal range of motion.      Cervical back: Normal range of motion and neck supple.   Skin:     General: Skin is warm and dry.   Neurological:      Mental Status: He is alert and oriented to person, place, and time.      Sensory: Sensation is intact.   Psychiatric:         Mood and Affect: Mood and affect normal.      LAB RESULTS  Lab " Results (last 24 hours)     Procedure Component Value Units Date/Time    POC Glucose Once [[id number]]  (Abnormal) Collected: 03/05/22 1153    Specimen: Blood Updated: 03/05/22 1159     Glucose 183 mg/dL      Comment: Meter: [id number] : [id number] Frandy RAMIREZ       POC Glucose Once [[id number]]  (Normal) Collected: 03/05/22 0734    Specimen: Blood Updated: 03/05/22 0736     Glucose 109 mg/dL      Comment: Meter: [id number] : [id number] Renetta RAMIREZ       Renal Function Panel [[id number]]  (Abnormal) Collected: 03/05/22 0527    Specimen: Blood Updated: 03/05/22 0643     Glucose 105 mg/dL      BUN 48 mg/dL      Creatinine 2.71 mg/dL      Sodium 140 mmol/L      Potassium 4.6 mmol/L      Chloride 109 mmol/L      CO2 24.0 mmol/L      Calcium 8.2 mg/dL      Albumin 3.00 g/dL      Phosphorus 3.6 mg/dL      Anion Gap 7.0 mmol/L      BUN/Creatinine Ratio 17.7     eGFR 25.1 mL/min/1.73      Comment: National Kidney Foundation and American Society of Nephrology (ASN) Task Force recommended calculation based on the Chronic Kidney Disease Epidemiology Collaboration (CKD-EPI) equation refit without adjustment for race.       Narrative:      GFR Normal >60  Chronic Kidney Disease <60  Kidney Failure <15      Uric Acid [[id number]]  (Normal) Collected: 03/05/22 0527    Specimen: Blood Updated: 03/05/22 0643     Uric Acid 5.7 mg/dL     Magnesium [[id number]]  (Normal) Collected: 03/05/22 0527    Specimen: Blood Updated: 03/05/22 0643     Magnesium 1.8 mg/dL     POC Glucose Once [[id number]]  (Abnormal) Collected: 03/04/22 2135    Specimen: Blood Updated: 03/04/22 2140     Glucose 269 mg/dL      Comment: Meter: [id number] : [id number] Renetta Diamond NA       POC Glucose Once [[id number]]  (Abnormal) Collected: 03/04/22 1703    Specimen: Blood Updated: 03/04/22 1713     Glucose 206 mg/dL      Comment: Meter: [id number] : [id number] Frank RAMIREZ           Imaging Results (Last 24 Hours)     ** No results found for the  last 24 hours. **             ECG 12 Lead  Component   Ref Range & Units 3/1/22 0256 2/3/22 0604 2/2/22 0342 2/1/22 1535 12/15/21 1248 5/20/21 2206   QT Interval   ms 482 P  488  482  491  511  469              HEART RATE= 61  bpm  RR Interval= 988  ms  SC Interval= 68  ms  P Horizontal Axis= 2  deg  P Front Axis= -55  deg  QRSD Interval= 172  ms  QT Interval= 482  ms  QRS Axis= -86  deg  T Wave Axis= 86  deg  - ABNORMAL ECG -  Sinus or ectopic atrial rhythm  Short SC interval  Right bundle branch block  Anteroseptal infarct, age indeterminate             Current Facility-Administered Medications:   •  apixaban (ELIQUIS) tablet 2.5 mg, 2.5 mg, Oral, Q12H, Josue Pickens MD, 2.5 mg at 03/05/22 0833  •  aspirin EC tablet 81 mg, 81 mg, Oral, Daily, Josue Pickens MD, 81 mg at 03/05/22 0833  •  atorvastatin (LIPITOR) tablet 10 mg, 10 mg, Oral, Nightly, Josue Pickens MD, 10 mg at 03/04/22 2011  •  buPROPion XL (WELLBUTRIN XL) 24 hr tablet 300 mg, 300 mg, Oral, Griselda MUIR Charles Brook III, MD, 300 mg at 03/05/22 0833  •  carvedilol (COREG) tablet 3.125 mg, 3.125 mg, Oral, BID With Meals, Josue Pickens MD, 3.125 mg at 03/05/22 0833  •  cholecalciferol (VITAMIN D3) tablet 1,000 Units, 1,000 Units, Oral, Daily, Josue Pickens MD, 1,000 Units at 03/05/22 0833  •  famotidine (PEPCID) tablet 20 mg, 20 mg, Oral, BID, Josue Pickens MD, 20 mg at 03/05/22 0834  •  insulin lispro (ADMELOG) injection 0-7 Units, 0-7 Units, Subcutaneous, 4x Daily With Meals & Nightly, Josue Pickens MD, 2 Units at 03/05/22 1230  •  levothyroxine (SYNTHROID, LEVOTHROID) tablet 75 mcg, 75 mcg, Oral, Daily, Josue Pickens MD, 75 mcg at 03/05/22 0833  •  polyethylene glycol (MIRALAX) packet 17 g, 17 g, Oral, Daily, Zahida Hsu MD, 17 g at 03/05/22 0833  •  [COMPLETED] Insert peripheral IV, , , Once **AND** sodium chloride 0.9 % flush 10 mL, 10 mL, Intravenous, PRN, Sergio Burgess MD  •  tamsulosin (FLOMAX) 24 hr capsule 0.4 mg, 0.4 mg,  Oral, Nightly, Ora Mcdonald MD, 0.4 mg at 03/04/22 2011  •  [START ON 3/6/2022] torsemide (DEMADEX) tablet 20 mg, 20 mg, Oral, Daily, Damien Elliott MD  •  zolpidem (AMBIEN) tablet 5 mg, 5 mg, Oral, Nightly PRN, Bob Villalobos III, MD, 5 mg at 03/02/22 2123    ASSESSMENT  Acute kidney injury   Chronic kidney disease stage IV  Elevated troponin with no chest pain  Diabetes mellitus  Hypertension with low BP  Hyperlipidemia  Gastroesophageal reflux disease    PLAN  CPM  Continue IVF  Adjust diuretics dose  Nephrology consult appreciated  Cardiology and psychiatry to follow patient  Adjust home medications  Stress ulcer DVT prophylaxis  Supportive care   PT/OT  Discussed with nursing staff   Discharge planning    ORA MCDONALD MD

## 2022-03-05 NOTE — CASE MANAGEMENT/SOCIAL WORK
Continued Stay Note  [facility]     Patient Name: Carltio Mo  MRN: [id number]  Today's Date: 3/5/2022    Admit Date: 3/1/2022     Discharge Plan     Row Name 03/05/22 1609       Plan    Plan Comments Return call from Harleen/[facility] stating they are not accepting any patients at this time. CCP to follow..........JW    Row Name 03/05/22 1501       Plan    Plan CCP working on placement, referrals to several facilities........Armen BENZ    Patient/Family in Agreement with Plan yes    Plan Comments Inbound call from RN stating MD anticipates patient may be ready for DC to rehab tomorrow 3/6/22. Call to Shanna/[facility], states [facility] are both full this weekend, she will follow-up Monday as she does anticipate availability beginning of the week. Call to Tracy/Noni, [facility] Teague has had a hold on admits for months and none of her buildings have availability today. Per Tracy if patient would like referral to [facility], they should have beds Tuesday. [facility] Amanda[facility] left and awaiting call back. Call to [facility] and left message with [facility] supervisor Harleen requesting call back. At this time we do not have an accepting facility with a bed available tomorrow. CCP will continue to follow and work on placement...............Armen BENZ               Discharge Codes    No documentation.               Expected Discharge Date and Time     Expected Discharge Date Expected Discharge Time    Mar 7, 2022             Karly Yo, DEMAR

## 2022-03-05 NOTE — PROGRESS NOTES
[facility] Progress Note      Patient Name: Carlito Mo  : 1955  MRN: [id number]  Primary Care Physician:  Florencia Caballero MD  Date of admission: 3/1/2022    Subjective     Interval History:   Follow up SYLVAIN on CKD IV.     Continues to feel a little better daily, no pnd, orthopnea or soa with exertion, no cp    Review of Systems:   As noted above    Objective     Vitals:   Temp:  [97.4 °F (36.3 °C)-97.8 °F (36.6 °C)] 97.6 °F (36.4 °C)  Heart Rate:  [] 72  Resp:  [18] 18  BP: (100-161)/(65-89) 161/79    Intake/Output Summary (Last 24 hours) at 3/5/2022 0907  Last data filed at 3/5/2022 0325  Gross per 24 hour   Intake 0 ml   Output 475 ml   Net -475 ml       Physical Exam:    General Appearance: alert, oriented x 3, no acute distress . Chronically ill.   Skin: warm and dry  HEENT: oral mucosa normal, nonicteric sclera  Neck: supple, flat JVP  Lungs: Clear to auscultation   Heart: RRR, normal S1 and S2. No rub .  Abdomen: soft, nontender, non-distended.  Normoactive bowels  Extremities: no edema.  Neuro: normal speech and mental status     Scheduled Meds:     apixaban, 2.5 mg, Oral, Q12H  aspirin, 81 mg, Oral, Daily  atorvastatin, 10 mg, Oral, Nightly  buPROPion XL, 300 mg, Oral, QAM  carvedilol, 3.125 mg, Oral, BID With Meals  cholecalciferol, 1,000 Units, Oral, Daily  famotidine, 20 mg, Oral, BID  insulin lispro, 0-7 Units, Subcutaneous, 4x Daily With Meals & Nightly  levothyroxine, 75 mcg, Oral, Daily  polyethylene glycol, 17 g, Oral, Daily  tamsulosin, 0.4 mg, Oral, Nightly      IV Meds:        Results Reviewed:   I have personally reviewed the results from the time of this admission to 3/5/2022 09:07 EST     Results from last 7 days   Lab Units 22  0527 22  0543 22  0425 22  0622 22  0402   SODIUM mmol/L 140 141 142 140 138   POTASSIUM mmol/L 4.6 4.4 4.4 3.9 4.1   CHLORIDE mmol/L 109* 109* 109* 107 105   CO2 mmol/L 24.0 23.0 23.0 20.6*  18.7*   BUN mg/dL 48* 54* 57* 68* 68*   CREATININE mg/dL 2.71* 2.77* 3.23* 3.88* 4.31*   CALCIUM mg/dL 8.2* 8.6 8.4* 9.2 8.8   BILIRUBIN mg/dL  --   --   --  0.2 0.2   ALK PHOS U/L  --   --   --  89 97   ALT (SGPT) U/L  --   --   --  49* 60*   AST (SGOT) U/L  --   --   --  22 33   GLUCOSE mg/dL 105* 60* 107* 81 242*       Estimated Creatinine Clearance: 29.7 mL/min (A) (by C-G formula based on SCr of 2.71 mg/dL (H)).    Results from last 7 days   Lab Units 03/05/22 0527 03/04/22 0543 03/03/22 0425 03/02/22  0622   MAGNESIUM mg/dL 1.8  --   --  2.0   PHOSPHORUS mg/dL 3.6 3.8 4.3  --        Results from last 7 days   Lab Units 03/05/22 0527 03/02/22  0622   URIC ACID mg/dL 5.7 9.3*       Results from last 7 days   Lab Units 03/04/22 0543 03/03/22 0425 03/02/22  0622 03/01/22  0402   WBC 10*3/mm3 6.21 5.47 6.68 8.30   HEMOGLOBIN g/dL 10.6* 10.4* 11.5* 11.9*   PLATELETS 10*3/mm3 216 224 250 240       Results from last 7 days   Lab Units 03/01/22  0402   INR  1.07       Assessment / Plan     ASSESSMENT:  1.  SYLVAIN on CKD IV> Baseline crt in 3's.  Volume depletion due to diuresis. Improved with IVF.,  Creatinine down to 2.77, electrolyte within acceptable range  2. Chronic combined systolic and diastolic heart failure. Compensated.  3. DM2  4. History of CVA  5. CAD sp CABG.   6.  Anemia of chronic kidney disease, hemoglobin today 10.6, no HAI is indicated yet    PLAN:  1.  heplock ivf  2.  reduce torsemide to 20 mg daily  3.  Monitor volume status  4.  Surveillance labs      Damien Elliott MD  03/05/22  09:07 [facility]    [facility]  [phone]

## 2022-03-05 NOTE — NURSING NOTE
[facility]  [phone]    ACCESS CENTER STATEMENT OF DISPOSITION        I, Carlito Mo, [facility] on 3/4/2022.  I understand the recommendations below and what follow-up action is expected of me.      Outpatient therapists and psychiatrist -commercial and passport plans    [facility] at [phone]    [facility] at [phone] (therapy) and [phone] (psychiatrist)    [facility] at [phone]    [facility] at [phone]    [facility] at [phone]    [facility] at [phone]                ________________________________  Patient/Parent/Guardian/POA Signature    ________________________________  Clinician Signature    3/4/2022  19:32 EST

## 2022-03-05 NOTE — PROGRESS NOTES
Continued Stay Note  [facility]     Patient Name: Carlito Mo  MRN: [id number]  Today's Date: 3/5/2022    Admit Date: 3/1/2022     Discharge Plan     Row Name 03/05/22 1509       Plan    Plan CCP working on placement, referrals to several facilities........Armen BENZ    Patient/Family in Agreement with Plan yes    Plan Comments Inbound call from RN stating MD anticipates patient may be ready for DC to rehab tomorrow 3/6/22. Call to Shanna/[facility], states [facility] are both full this weekend, she will follow-up Monday as she does anticipate availability beginning of the week. Call to Tracy/[facility]i, states [facility] Teague has had a hold on admits for months and none of her buildings have availability today. Per Tracy if patient would like referral to [facility], they should have beds Tuesday. [facility] Amanda[facility] and awaiting call back. Call to [facility] and left message with [facility] supervisor Harleen requesting call back. At this time we do not have an accepting facility with a bed available tomorrow. CCP will continue to follow and work on placement...............Armen BENZ               Discharge Codes    No documentation.               Expected Discharge Date and Time     Expected Discharge Date Expected Discharge Time    Mar 7, 2022             Parvin Ross, DEMAR

## 2022-03-05 NOTE — SIGNIFICANT NOTE
Pt in bed sleeping. Pt was woke and encouraged to participate with PT. Pt stated he did not sleeping well during the night and wanted to sleep.

## 2022-03-05 NOTE — NURSING NOTE
Access center follow up.    Pt. resting soundly with eyes closed in bed. Spoke with primary RN. Staff reports pt. fine, still presents with flat affect. Recent increase of wellbutrin to 300mg daily noted. Ambien last used 3 nights ago. Pt. received outpatient psychiatric referrals. Pt. to d/c to SNF pending referrals/placement. Access following.

## 2022-03-05 NOTE — PLAN OF CARE
Problem: Adult Inpatient Plan of Care  Goal: Plan of Care Review  3/5/2022 0417 by Tatiana Felix, RN  Outcome: Ongoing, Progressing  Flowsheets (Taken 3/5/2022 0417)  Progress: improving  Outcome Evaluation: VSS. IVF infusing. Pt voiding without difficulty per urinal. Renal following. Falls precautions maintained. Bed alarm and pt calls out for assistance as needed. Orthostatics positive on dayshift and Dr Olguin aware and following. PT eval and treat. CCP working on d/c planning to rehab prior to home with spouse and HH. Will continue to monitor.  3/5/2022 0417 by Tatiana Felix RN  Reactivated  Flowsheets (Taken 3/5/2022 0417)  Progress: improving  Outcome Evaluation: VSS. IVF infusing. Pt voiding without difficulty per urinal. Renal following. Falls precautions maintained. Bed alarm and pt calls out for assistance as needed. Orthostatics positive on dayshift and Dr Olguin aware and following. PT eval and treat. CCP working on d/c planning to rehab prior to home with spouse and HH. Will continue to monitor.   Goal Outcome Evaluation:           Progress: improving  Outcome Evaluation: VSS. IVF infusing. Pt voiding without difficulty per urinal. Renal following. Falls precautions maintained. Bed alarm and pt calls out for assistance as needed. Orthostatics positive on dayshift and Dr Olguin aware and following. PT eval and treat. CCP working on d/c planning to rehab prior to home with spouse and HH. Will continue to monitor.   -reduced keppra to 750 bid per neurology  -seizure provocation protocol, -IV Benadryl and Tramodol  - rescheduled doses for 11/27 @ 0800    -EEG ordered, Called EEG this AM, in discussion with RN patient is hooked up this afternoon and reported he had an Event @ 13:50, not notified.  Will discuss with Neurology.

## 2022-03-06 LAB
ANION GAP SERPL CALCULATED.3IONS-SCNC: 6 MMOL/L (ref 5–15)
BASOPHILS # BLD AUTO: 0.01 10*3/MM3 (ref 0–0.2)
BASOPHILS NFR BLD AUTO: 0.1 % (ref 0–1.5)
BUN SERPL-MCNC: 51 MG/DL (ref 8–23)
BUN/CREAT SERPL: 18.8 (ref 7–25)
CALCIUM SPEC-SCNC: 8.4 MG/DL (ref 8.6–10.5)
CHLORIDE SERPL-SCNC: 110 MMOL/L (ref 98–107)
CO2 SERPL-SCNC: 23 MMOL/L (ref 22–29)
CREAT SERPL-MCNC: 2.71 MG/DL (ref 0.76–1.27)
DEPRECATED RDW RBC AUTO: 43.8 FL (ref 37–54)
EGFRCR SERPLBLD CKD-EPI 2021: 25.1 ML/MIN/1.73
EOSINOPHIL # BLD AUTO: 0.26 10*3/MM3 (ref 0–0.4)
EOSINOPHIL NFR BLD AUTO: 3.9 % (ref 0.3–6.2)
ERYTHROCYTE [DISTWIDTH] IN BLOOD BY AUTOMATED COUNT: 14.8 % (ref 12.3–15.4)
GLUCOSE BLDC GLUCOMTR-MCNC: 123 MG/DL (ref 70–130)
GLUCOSE BLDC GLUCOMTR-MCNC: 150 MG/DL (ref 70–130)
GLUCOSE BLDC GLUCOMTR-MCNC: 186 MG/DL (ref 70–130)
GLUCOSE BLDC GLUCOMTR-MCNC: 196 MG/DL (ref 70–130)
GLUCOSE BLDC GLUCOMTR-MCNC: 204 MG/DL (ref 70–130)
GLUCOSE SERPL-MCNC: 128 MG/DL (ref 65–99)
HCT VFR BLD AUTO: 29.6 % (ref 37.5–51)
HGB BLD-MCNC: 9.5 G/DL (ref 13–17.7)
IMM GRANULOCYTES # BLD AUTO: 0.03 10*3/MM3 (ref 0–0.05)
IMM GRANULOCYTES NFR BLD AUTO: 0.4 % (ref 0–0.5)
LYMPHOCYTES # BLD AUTO: 1.3 10*3/MM3 (ref 0.7–3.1)
LYMPHOCYTES NFR BLD AUTO: 19.5 % (ref 19.6–45.3)
MCH RBC QN AUTO: 26.3 PG (ref 26.6–33)
MCHC RBC AUTO-ENTMCNC: 32.1 G/DL (ref 31.5–35.7)
MCV RBC AUTO: 82 FL (ref 79–97)
MONOCYTES # BLD AUTO: 0.67 10*3/MM3 (ref 0.1–0.9)
MONOCYTES NFR BLD AUTO: 10 % (ref 5–12)
NEUTROPHILS NFR BLD AUTO: 4.41 10*3/MM3 (ref 1.7–7)
NEUTROPHILS NFR BLD AUTO: 66.1 % (ref 42.7–76)
NRBC BLD AUTO-RTO: 0 /100 WBC (ref 0–0.2)
PLATELET # BLD AUTO: 205 10*3/MM3 (ref 140–450)
PMV BLD AUTO: 10.9 FL (ref 6–12)
POTASSIUM SERPL-SCNC: 5 MMOL/L (ref 3.5–5.2)
RBC # BLD AUTO: 3.61 10*6/MM3 (ref 4.14–5.8)
SODIUM SERPL-SCNC: 139 MMOL/L (ref 136–145)
WBC NRBC COR # BLD: 6.68 10*3/MM3 (ref 3.4–10.8)

## 2022-03-06 PROCEDURE — 97530 THERAPEUTIC ACTIVITIES: CPT

## 2022-03-06 PROCEDURE — 80048 BASIC METABOLIC PNL TOTAL CA: CPT | Performed by: INTERNAL MEDICINE

## 2022-03-06 PROCEDURE — 82962 GLUCOSE BLOOD TEST: CPT

## 2022-03-06 PROCEDURE — 85025 COMPLETE CBC W/AUTO DIFF WBC: CPT | Performed by: HOSPITALIST

## 2022-03-06 PROCEDURE — 63710000001 INSULIN LISPRO (HUMAN) PER 5 UNITS: Performed by: HOSPITALIST

## 2022-03-06 RX ADMIN — ZOLPIDEM TARTRATE 5 MG: 5 TABLET ORAL at 23:52

## 2022-03-06 RX ADMIN — ASPIRIN 81 MG: 81 TABLET, COATED ORAL at 08:11

## 2022-03-06 RX ADMIN — POLYETHYLENE GLYCOL 3350 17 G: 17 POWDER, FOR SOLUTION ORAL at 08:11

## 2022-03-06 RX ADMIN — INSULIN LISPRO 2 UNITS: 100 INJECTION, SOLUTION INTRAVENOUS; SUBCUTANEOUS at 17:27

## 2022-03-06 RX ADMIN — BUPROPION HYDROCHLORIDE 300 MG: 300 TABLET, EXTENDED RELEASE ORAL at 06:36

## 2022-03-06 RX ADMIN — CARVEDILOL 3.12 MG: 3.12 TABLET, FILM COATED ORAL at 08:11

## 2022-03-06 RX ADMIN — LEVOTHYROXINE SODIUM 75 MCG: 0.07 TABLET ORAL at 08:11

## 2022-03-06 RX ADMIN — ATORVASTATIN CALCIUM 10 MG: 10 TABLET, FILM COATED ORAL at 20:19

## 2022-03-06 RX ADMIN — INSULIN LISPRO 2 UNITS: 100 INJECTION, SOLUTION INTRAVENOUS; SUBCUTANEOUS at 22:27

## 2022-03-06 RX ADMIN — Medication 1000 UNITS: at 08:11

## 2022-03-06 RX ADMIN — APIXABAN 2.5 MG: 2.5 TABLET, FILM COATED ORAL at 08:11

## 2022-03-06 RX ADMIN — CARVEDILOL 3.12 MG: 3.12 TABLET, FILM COATED ORAL at 17:27

## 2022-03-06 RX ADMIN — INSULIN LISPRO 3 UNITS: 100 INJECTION, SOLUTION INTRAVENOUS; SUBCUTANEOUS at 12:25

## 2022-03-06 RX ADMIN — APIXABAN 2.5 MG: 2.5 TABLET, FILM COATED ORAL at 20:19

## 2022-03-06 RX ADMIN — FAMOTIDINE 20 MG: 20 TABLET ORAL at 08:11

## 2022-03-06 RX ADMIN — FAMOTIDINE 20 MG: 20 TABLET ORAL at 20:19

## 2022-03-06 RX ADMIN — TAMSULOSIN HYDROCHLORIDE 0.4 MG: 0.4 CAPSULE ORAL at 20:19

## 2022-03-06 RX ADMIN — TORSEMIDE 20 MG: 20 TABLET ORAL at 08:11

## 2022-03-06 NOTE — PLAN OF CARE
Problem: Adult Inpatient Plan of Care  Goal: Plan of Care Review  Flowsheets (Taken 3/6/2022 0226)  Plan of Care Reviewed With: patient  Outcome Evaluation: VSS. Pt denies pain. IV saline locked. Labs in AM. Plans for Torsemide to start in AM. Pt voiding without difficulty per urinal. No complaints or concerns per pt. Will continue to monitor.   Goal Outcome Evaluation:  Plan of Care Reviewed With: patient        Progress: improving  Outcome Evaluation: VSS. Pt denies pain. IV saline locked. Labs in AM. Plans for Torsemide to start in AM. Pt voiding without difficulty per urinal. No complaints or concerns per pt. Will continue to monitor.

## 2022-03-06 NOTE — PROGRESS NOTES
[facility] Progress Note      Patient Name: Carlito Mo  : 1955  MRN: [id number]  Primary Care Physician:  Florencia Caballero MD  Date of admission: 3/1/2022    Subjective     Interval History:   Follow up SYLVAIN on CKD IV.      no pnd, orthopnea or soa with exertion, no cp, didn't sleep well    Review of Systems:   As noted above    Objective     Vitals:   Temp:  [97.8 °F (36.6 °C)-98.4 °F (36.9 °C)] 98.1 °F (36.7 °C)  Heart Rate:  [72-79] 79  Resp:  [18] 18  BP: (129-162)/(67-84) 162/84    Intake/Output Summary (Last 24 hours) at 3/6/2022 1227  Last data filed at 3/6/2022 1100  Gross per 24 hour   Intake --   Output 1700 ml   Net -1700 ml       Physical Exam:    General Appearance: alert, oriented x 3, no acute distress . Chronically ill.   Skin: warm and dry  HEENT: oral mucosa normal, nonicteric sclera  Neck: supple, flat JVP  Lungs: Clear to auscultation   Heart: RRR, normal S1 and S2. No rub .  Abdomen: soft, nontender, non-distended.  Normoactive bowels  Extremities: no edema.  Neuro: normal speech and mental status     Scheduled Meds:     apixaban, 2.5 mg, Oral, Q12H  aspirin, 81 mg, Oral, Daily  atorvastatin, 10 mg, Oral, Nightly  buPROPion XL, 300 mg, Oral, QAM  carvedilol, 3.125 mg, Oral, BID With Meals  cholecalciferol, 1,000 Units, Oral, Daily  famotidine, 20 mg, Oral, BID  insulin lispro, 0-7 Units, Subcutaneous, 4x Daily With Meals & Nightly  levothyroxine, 75 mcg, Oral, Daily  polyethylene glycol, 17 g, Oral, Daily  tamsulosin, 0.4 mg, Oral, Nightly  torsemide, 20 mg, Oral, Daily      IV Meds:        Results Reviewed:   I have personally reviewed the results from the time of this admission to 3/6/2022 12:27 EST     Results from last 7 days   Lab Units 22  0604 22  0527 22  0543 22  0425 22  0622 22  0402   SODIUM mmol/L 139 140 141   < > 140 138   POTASSIUM mmol/L 5.0 4.6 4.4   < > 3.9 4.1   CHLORIDE mmol/L 110* 109* 109*   < >  107 105   CO2 mmol/L 23.0 24.0 23.0   < > 20.6* 18.7*   BUN mg/dL 51* 48* 54*   < > 68* 68*   CREATININE mg/dL 2.71* 2.71* 2.77*   < > 3.88* 4.31*   CALCIUM mg/dL 8.4* 8.2* 8.6   < > 9.2 8.8   BILIRUBIN mg/dL  --   --   --   --  0.2 0.2   ALK PHOS U/L  --   --   --   --  89 97   ALT (SGPT) U/L  --   --   --   --  49* 60*   AST (SGOT) U/L  --   --   --   --  22 33   GLUCOSE mg/dL 128* 105* 60*   < > 81 242*    < > = values in this interval not displayed.       Estimated Creatinine Clearance: 29.9 mL/min (A) (by C-G formula based on SCr of 2.71 mg/dL (H)).    Results from last 7 days   Lab Units 03/05/22  0527 03/04/22  0543 03/03/22 0425 03/02/22  0622   MAGNESIUM mg/dL 1.8  --   --  2.0   PHOSPHORUS mg/dL 3.6 3.8 4.3  --        Results from last 7 days   Lab Units 03/05/22  0527 03/02/22  0622   URIC ACID mg/dL 5.7 9.3*       Results from last 7 days   Lab Units 03/06/22  0604 03/04/22  0543 03/03/22  0425 03/02/22  0622 03/01/22  0402   WBC 10*3/mm3 6.68 6.21 5.47 6.68 8.30   HEMOGLOBIN g/dL 9.5* 10.6* 10.4* 11.5* 11.9*   PLATELETS 10*3/mm3 205 216 224 250 240       Results from last 7 days   Lab Units 03/01/22  0402   INR  1.07       Assessment / Plan     ASSESSMENT:  1.  SYLVAIN on CKD IV>  Creatinine stable at 2.7, near baseline; electrolyte within acceptable range  2. Chronic combined systolic and diastolic heart failure. Compensated.  3. DM2  4. History of CVA  5. CAD sp CABG.   6.  Anemia of chronic kidney disease, hemoglobin today 10.6, no HAI is indicated yet    PLAN:  1.  heplock ivf  2.  reduced torsemide to 20 mg daily  3.  Monitor volume status  4.  Surveillance labs      Damien Elliott MD  03/06/22  12:27 [facility]    [facility]  [phone]

## 2022-03-06 NOTE — THERAPY TREATMENT NOTE
Patient Name: Carlito Mo  : 1955    MRN: [id number]                              Today's Date: 3/6/2022       Admit Date: 3/1/2022    Visit Dx:     ICD-10-CM ICD-9-CM   1. Generalized weakness  R53.1 780.79   2. Acute on chronic renal insufficiency  N28.9 593.9    N18.9 585.9   3. Fall, initial encounter  W19.XXXA E888.9   4. Elevated troponin  R77.8 790.6     Patient Active Problem List   Diagnosis   • Major depressive disorder with single episode, in partial remission ([facility])   • Other hyperlipidemia   • Vitamin D deficiency   • Erectile dysfunction   • Chronic fatigue   • Type 2 diabetes mellitus with ophthalmic complication ([facility])   • Skin lesion of chest wall   • Slow transit constipation   • Benign prostatic hyperplasia with nocturia   • History of basal cell carcinoma   • High blood pressure associated with diabetes ([facility])   • Acquired hypothyroidism   • Hypertensive urgency   • Recurrent strokes ([facility])   • Urinary retention   • Pneumonia   • Acute on chronic combined systolic and diastolic congestive heart failure ([facility])   • Acute respiratory failure with hypoxia ([facility])   • Suspected sleep apnea   • Pleural effusion   • YAW (obstructive sleep apnea)   • Coronary artery disease involving native coronary artery of native heart without angina pectoris   • Chronically elevated troponin   • Colon cancer screening   • Enlarged lymph nodes   • Functional gait abnormality   • History of myocardial infarction   • Hospital discharge follow-up   • Insomnia   • Iron deficiency anemia   • Major depression, recurrent ([facility])   • Anemia, chronic renal failure, stage 3 (moderate) ([facility])   • Essential hypertension   • Adverse effect of iron and its compounds, initial encounter   • Acute on chronic renal insufficiency   • Debility   • Falls frequently   • Acute pain of right shoulder   • Acute on chronic anemia   • Heme positive stool   • Neurogenic orthostatic hypotension ([facility])   • Symptomatic anemia   • History of colon  polyps   • Helicobacter pylori gastritis   • Esophagitis   • Sleep related hypoxia   • Embolic stroke (H[facility])   • Patent foramen ovale   • Pleural effusion, bilateral   • Cardiomyopathy (HCC)   • Lower abdominal pain   • Diarrhea   • CKD (chronic kidney disease) stage 4, GFR 15-29 ml/min ([facility]CC)   • Hypertension not at goal   • Memory loss due to medical condition   • Generalized weakness     Past Medical History:   Diagnosis Date   • Acquired hypothyroidism    • Acute congestive heart failure (HCC)    • Acute respiratory failure with hypoxia (HCC)    • Acute sinusitis    • SYLVAIN (acute kidney injury) (HCC)     on CKD   • Anemia    • Arthritis    • CAD (coronary artery disease)    • Cancer (HCC)     skin   • Chronic combined systolic and diastolic congestive heart failure (HCC)    • Chronic ischemic heart disease    • CKD (chronic kidney disease)    • COPD (chronic obstructive pulmonary disease) (HCC)    • Depression    • Diabetes mellitus type 2, uncontrolled, without complications    • Disease of thyroid gland    • Elevated cholesterol    • Fatigue    • Frequent PVCs    • GERD (gastroesophageal reflux disease)    • Heart attack (HCC)    • History of coronary artery disease     with remote history of bypass surgery in 2001   • History of transfusion    • Hyperglycemia    • Hyperlipidemia    • Hypertension    • Hypertensive encephalopathy    • Mental status change     Acute   • YAW on CPAP    • Pleural effusion    • Pneumonia    • RBBB (right bundle branch block)    • Screening for prostate cancer 2011   • Stroke (HCC)    • TIA (transient ischemic attack)    • Type 2 diabetes mellitus (HCC)    • Urinary retention    • Vitamin D deficiency      Past Surgical History:   Procedure Laterality Date   • APPENDECTOMY N/A 02/14/2016    Dr. Alexey Dodson   • COLONOSCOPY      over 20 years ago.  no polyps    • COLONOSCOPY N/A 9/18/2018    Procedure: COLONOSCOPY;  Surgeon: Jose Enrique Louie MD;  Location: [facility] ENDOSCOPY;   Service: Gastroenterology   • COLONOSCOPY N/A 9/19/2018    IH, EH, polyps (TAs w/low grade dysplasia)   • COLONOSCOPY N/A 6/1/2020    Procedure: COLONOSCOPY;  Surgeon: Jose Enrique Louie MD;  Location: [facility] ENDOSCOPY;  Service: Gastroenterology;  Laterality: N/A;  Pre op-Anemia, Melena, History of Polyps  Post op-To Cecum/TI, Polyp, Poor Prep   • CORONARY ARTERY BYPASS GRAFT  11/2001   • ENDOSCOPY N/A 5/29/2020    Procedure: ESOPHAGOGASTRODUODENOSCOPY with biopsies;  Surgeon: Amanda Lovelace MD;  Location: [facility] ENDOSCOPY;  Service: Gastroenterology;  Laterality: N/A;  pre-anemia, dark stools  post-esophagitis, hiatal hernia   • ENDOSCOPY N/A 9/15/2020    Procedure: ESOPHAGOGASTRODUODENOSCOPY WITH BIOPSIES;  Surgeon: Jose Enrique Louie MD;  Location: [facility] ENDOSCOPY;  Service: Gastroenterology;  Laterality: N/A;  pre: history of melena and esophagitis  post: mild esophagitis and gastritis, small hiatal hernia   • HERNIA REPAIR Left 12/05/2019   • TONSILLECTOMY     • VASECTOMY        General Information     Row Name 03/06/22 1551          Physical Therapy Time and Intention    Document Type therapy note (daily note)  -JESSICA     Mode of Treatment individual therapy;physical therapy  -     Row Name 03/06/22 1551          General Information    Patient Profile Reviewed yes  -JESSICA     Existing Precautions/Restrictions fall;other (see comments)  -     Row Name 03/06/22 1551          Cognition    Orientation Status (Cognition) oriented x 4  -     Row Name 03/06/22 1551          Safety Issues, Functional Mobility    Impairments Affecting Function (Mobility) balance;strength;endurance/activity tolerance  -     Comment, Safety Issues/Impairments (Mobility) Gait belt and non-skid socks  -JESSICA           User Key  (r) = Recorded By, (t) = Taken By, (c) = Cosigned By    Initials Name Provider Type    Beatriz Lamar PT Physical Therapist               Mobility     Row Name 03/06/22 1551          Bed Mobility     Bed Mobility supine-sit  -KA     Supine-Sit Jeannette (Bed Mobility) modified independence  -KA     Sit-Supine Jeannette (Bed Mobility) modified independence  -KA     Assistive Device (Bed Mobility) bed rails;head of bed elevated  -KA     Comment, (Bed Mobility) Pt denies lightheadedness and dizziness today with change in positioning  -KA     Row Name 03/06/22 1551          Transfers    Comment, (Transfers) Transfered from bed to bedside commode without AD, CGA for balance.  -KA     Row Name 03/06/22 1551          Bed-Chair Transfer    Bed-Chair Jeannette (Transfers) contact guard;1 person assist  -KA     Row Name 03/06/22 1551          Sit-Stand Transfer    Sit-Stand Jeannette (Transfers) contact guard;verbal cues  -     Assistive Device (Sit-Stand Transfers) walker, front-wheeled  -KA     Row Name 03/06/22 1551          Gait/Stairs (Locomotion)    Jeannette Level (Gait) contact guard;verbal cues  -     Assistive Device (Gait) walker, front-wheeled  -KA     Distance in Feet (Gait) 90 feet  -KA     Deviations/Abnormal Patterns (Gait) eli decreased;gait speed decreased;base of support, narrow;stride length decreased  -KA     Bilateral Gait Deviations forward flexed posture  -KA     Jeannette Level (Stairs) not tested  -KA     Comment, (Gait/Stairs) Pt requests use of AD so that he can walk further today, needs mod VCs for upright posture and to stay within ADRIEL of walker  -KA           User Key  (r) = Recorded By, (t) = Taken By, (c) = Cosigned By    Initials Name Provider Type    Beatriz Lamar, DENISSE Physical Therapist               Obj/Interventions     Row Name 03/06/22 1555          Balance    Static Sitting Balance supervision  -KA     Dynamic Sitting Balance supervision  -KA     Static Standing Balance contact guard  -KA     Dynamic Standing Balance contact guard;minimal assist  -KA           User Key  (r) = Recorded By, (t) = Taken By, (c) = Cosigned By    Initials Name Provider  Type    Beatriz Lamar, PT Physical Therapist               Goals/Plan    No documentation.                Clinical Impression     Row Name 03/06/22 1556          Pain    Pretreatment Pain Rating 0/10 - no pain  -JESSICA     Posttreatment Pain Rating 0/10 - no pain  -     Row Name 03/06/22 1556          Plan of Care Review    Plan of Care Reviewed With patient  -     Progress improving  -     Outcome Evaluation Pt seen by PT this PM for treatment.  Mod I with all bed mobility today, performs transfer from bed to bedside commode with CGA and no AD, mild instability.  Ambulates 90 feet with RW, cues for upright posture and assistive device use, limited by fatigue.  Pt remains appropriate for skilled therapy to improve strength, coordination, gait, and transfers and to reduce fall risk. Bed alarm not functioning, nurse Candy aware.  -JESSICA     Row Name 03/06/22 1556          Therapy Assessment/Plan (PT)    Criteria for Skilled Interventions Met (PT) yes;meets criteria  -JESSICA     [facility] Name 03/06/22 1556          Vital Signs    O2 Delivery Pre Treatment room air  -     O2 Delivery Intra Treatment room air  -     O2 Delivery Post Treatment room air  -     Pre Patient Position Supine  -     Intra Patient Position Standing  -     Post Patient Position Supine  -     Row Name 03/06/22 1556          Positioning and Restraints    Pre-Treatment Position in bed  -     Post Treatment Position bed  -KA     In Bed notified nsg;supine;call light within reach;encouraged to call for assist  Bed alarm not functioning, notified nurse Candy CRUZ           User Key  (r) = Recorded By, (t) = Taken By, (c) = Cosigned By    Initials Name Provider Type    Beatriz Lamar, PT Physical Therapist               Outcome Measures     Row Name 03/06/22 1600          How much help from another person do you currently need...    Turning from your back to your side while in flat bed without using bedrails? 3  -KA     Moving from lying  on back to sitting on the side of a flat bed without bedrails? 3  -KA     Moving to and from a bed to a chair (including a wheelchair)? 3  -KA     Standing up from a chair using your arms (e.g., wheelchair, bedside chair)? 3  -KA     Climbing 3-5 steps with a railing? 2  -KA     To walk in hospital room? 3  -KA     AM-PAC 6 Clicks Score (PT) 17  -KA     Row Name 03/06/22 1600          Functional Assessment    Outcome Measure Options AM-PAC 6 Clicks Basic Mobility (PT)  -           User Key  (r) = Recorded By, (t) = Taken By, (c) = Cosigned By    Initials Name Provider Type    Beatriz Lamar, DENISSE Physical Therapist                             Physical Therapy Education                 Title: PT OT SLP Therapies (In Progress)     Topic: Physical Therapy (In Progress)     Point: Mobility training (Done)     Learning Progress Summary           Patient Acceptance, E, VU by JESSICA at 3/6/2022 1601    Acceptance, E,TB, VU,NR by AFRICA at 3/4/2022 1204                   Point: Home exercise program (Not Started)     Learner Progress:  Not documented in this visit.          Point: Body mechanics (Done)     Learning Progress Summary           Patient Acceptance, E, VU by JESSICA at 3/6/2022 1601                   Point: Precautions (Done)     Learning Progress Summary           Patient Acceptance, E, VU by JESSICA at 3/6/2022 1601                               User Key     Initials Effective Dates Name Provider Type Discipline    AFRICA 10/22/21 -  Nikky Katz, PT Physical Therapist PT    JESSICA 08/24/21 -  Beatriz Robles, DENISSE Physical Therapist PT              PT Recommendation and Plan     Plan of Care Reviewed With: patient  Progress: improving  Outcome Evaluation: Pt seen by PT this PM for treatment.  Mod I with all bed mobility today, performs transfer from bed to bedside commode with CGA and no AD, mild instability.  Ambulates 90 feet with RW, cues for upright posture and assistive device use, limited by fatigue.  Pt remains  appropriate for skilled therapy to improve strength, coordination, gait, and transfers and to reduce fall risk. Bed alarm not functioning, nurse Candy aware.     Time Calculation:    PT Charges     Row Name 03/06/22 1602             Time Calculation    Start Time 1452  -KA      Stop Time 1511  -KA      Time Calculation (min) 19 min  -KA      PT Received On 03/06/22  -KA      PT - Next Appointment 03/07/22  -KA              Time Calculation- PT    Total Timed Code Minutes- PT 19 minute(s)  -KA              Timed Charges    99371 - PT Therapeutic Activity Minutes 19  -KA              Total Minutes    Timed Charges Total Minutes 19  -KA       Total Minutes 19  -KA            User Key  (r) = Recorded By, (t) = Taken By, (c) = Cosigned By    Initials Name Provider Type    Beatriz Lamar, DENISSE Physical Therapist              Therapy Charges for Today     Code Description Service Date Service Provider Modifiers Qty    [id number]  PT THERAPEUTIC ACT EA 15 MIN 3/6/2022 Beatriz Robles, PT GP 1          PT G-Codes  Outcome Measure Options: AM-PAC 6 Clicks Basic Mobility (PT)  AM-PAC 6 Clicks Score (PT): 17  AM-PAC 6 Clicks Score (OT): 17    Beatriz Robles PT  3/6/2022

## 2022-03-06 NOTE — PROGRESS NOTES
"Daily progress note    Chief complaint  Doing same  No new complaints  Denies chest pain shortness of breath palpitation     History of present illness  66-year-old white male who is well-known to our service with history of diabetes hypertension hyperlipidemia and chronic kidney disease stage IV presented to [facility], [place], operated by [facility] emergency room with generalized weakness and he fell at home.  Patient denies any injury.  Patient does have dizziness lightheadedness but denies any chest pain increase shortness of breath abdominal pain nausea vomiting diarrhea.  Patient work-up in ER revealed acute kidney injury on top of chronic kidney disease stage IV admit for management.  Patient has chronic elevated troponin with no chest pain.     REVIEW OF SYSTEMS  Unremarkable except generalized weakness     PHYSICAL EXAM  Blood pressure 163/87, pulse 92, temperature 97.8 °F (36.6 °C), temperature source Oral, resp. rate 18, height 182.9 cm (72\"), weight 78.8 kg (173 lb 11.2 oz), SpO2 96 %.    Constitutional:       General: He is not in acute distress.  HENT:      Head: Normocephalic and atraumatic.   Eyes:      Pupils: Pupils are equal, round, and reactive to light.   Cardiovascular:      Rate and Rhythm: Normal rate and regular rhythm.      Heart sounds: Normal heart sounds.   Pulmonary:      Effort: Pulmonary effort is normal. No respiratory distress.      Breath sounds: Normal breath sounds.   Abdominal:      Palpations: Abdomen is soft.      Tenderness: There is no abdominal tenderness. There is no guarding or rebound.   Musculoskeletal:         General: Normal range of motion.      Cervical back: Normal range of motion and neck supple.   Skin:     General: Skin is warm and dry.   Neurological:      Mental Status: He is alert and oriented to person, place, and time.      Sensory: Sensation is intact.   Psychiatric:         Mood and Affect: Mood and affect normal.      LAB RESULTS  Lab Results (last 24 hours)     Procedure " Component Value Units Date/Time    POC Glucose Once [[id number]]  (Abnormal) Collected: 03/06/22 1149    Specimen: Blood Updated: 03/06/22 1151     Glucose 204 mg/dL      Comment: Meter: [id number] : [id number] Frandy RAMIREZ       Basic Metabolic Panel [[id number]]  (Abnormal) Collected: 03/06/22 0604    Specimen: Blood Updated: 03/06/22 0726     Glucose 128 mg/dL      BUN 51 mg/dL      Creatinine 2.71 mg/dL      Sodium 139 mmol/L      Potassium 5.0 mmol/L      Chloride 110 mmol/L      CO2 23.0 mmol/L      Calcium 8.4 mg/dL      BUN/Creatinine Ratio 18.8     Anion Gap 6.0 mmol/L      eGFR 25.1 mL/min/1.73      Comment: National Kidney Foundation and American Society of Nephrology (ASN) Task Force recommended calculation based on the Chronic Kidney Disease Epidemiology Collaboration (CKD-EPI) equation refit without adjustment for race.       Narrative:      GFR Normal >60  Chronic Kidney Disease <60  Kidney Failure <15      CBC & Differential [[id number]]  (Abnormal) Collected: 03/06/22 0604    Specimen: Blood Updated: 03/06/22 0712    Narrative:      The following orders were created for panel order CBC & Differential.  Procedure                               Abnormality         Status                     ---------                               -----------         ------                     CBC Auto Differential[[id number]]        Abnormal            Final result                 Please view results for these tests on the individual orders.    CBC Auto Differential [[id number]]  (Abnormal) Collected: 03/06/22 0604    Specimen: Blood Updated: 03/06/22 0712     WBC 6.68 10*3/mm3      RBC 3.61 10*6/mm3      Hemoglobin 9.5 g/dL      Hematocrit 29.6 %      MCV 82.0 fL      MCH 26.3 pg      MCHC 32.1 g/dL      RDW 14.8 %      RDW-SD 43.8 fl      MPV 10.9 fL      Platelets 205 10*3/mm3      Neutrophil % 66.1 %      Lymphocyte % 19.5 %      Monocyte % 10.0 %      Eosinophil % 3.9 %      Basophil % 0.1 %      Immature Grans %  0.4 %      Neutrophils, Absolute 4.41 10*3/mm3      Lymphocytes, Absolute 1.30 10*3/mm3      Monocytes, Absolute 0.67 10*3/mm3      Eosinophils, Absolute 0.26 10*3/mm3      Basophils, Absolute 0.01 10*3/mm3      Immature Grans, Absolute 0.03 10*3/mm3      nRBC 0.0 /100 WBC     POC Glucose Once [[id number]]  (Normal) Collected: 03/06/22 0614    Specimen: Blood Updated: 03/06/22 0615     Glucose 123 mg/dL      Comment: Meter: [id number] : [id number] Chantale RAMIREZ       POC Glucose Once [[id number]]  (Abnormal) Collected: 03/05/22 2051    Specimen: Blood Updated: 03/05/22 2053     Glucose 178 mg/dL      Comment: Meter: [id number] : [id number] Silvana Molina       POC Glucose Once [[id number]]  (Abnormal) Collected: 03/05/22 1653    Specimen: Blood Updated: 03/05/22 1655     Glucose 196 mg/dL      Comment: Meter: [id number] : [id number] Frandy RAMIREZ           Imaging Results (Last 24 Hours)     ** No results found for the last 24 hours. **             ECG 12 Lead  Component   Ref Range & Units 3/1/22 0256 2/3/22 0604 2/2/22 0342 2/1/22 1535 12/15/21 1248 5/20/21 2206   QT Interval   ms 482 P  488  482  491  511  469              HEART RATE= 61  bpm  RR Interval= 988  ms  TX Interval= 68  ms  P Horizontal Axis= 2  deg  P Front Axis= -55  deg  QRSD Interval= 172  ms  QT Interval= 482  ms  QRS Axis= -86  deg  T Wave Axis= 86  deg  - ABNORMAL ECG -  Sinus or ectopic atrial rhythm  Short TX interval  Right bundle branch block  Anteroseptal infarct, age indeterminate             Current Facility-Administered Medications:   •  apixaban (ELIQUIS) tablet 2.5 mg, 2.5 mg, Oral, Q12H, Josue Pickens MD, 2.5 mg at 03/06/22 0811  •  aspirin EC tablet 81 mg, 81 mg, Oral, Daily, Josue Pickens MD, 81 mg at 03/06/22 0811  •  atorvastatin (LIPITOR) tablet 10 mg, 10 mg, Oral, Nightly, Josue Pickens MD, 10 mg at 03/05/22 1943  •  buPROPion XL (WELLBUTRIN XL) 24 hr tablet 300 mg, 300 mg, Oral, Griselda MUIR [place] III,  MD, 300 mg at 03/06/22 0636  •  carvedilol (COREG) tablet 3.125 mg, 3.125 mg, Oral, BID With Meals, Ora Pickens MD, 3.125 mg at 03/06/22 0811  •  cholecalciferol (VITAMIN D3) tablet 1,000 Units, 1,000 Units, Oral, Daily, Ora Pickens MD, 1,000 Units at 03/06/22 0811  •  famotidine (PEPCID) tablet 20 mg, 20 mg, Oral, BID, Ora Pickens MD, 20 mg at 03/06/22 0811  •  insulin lispro (ADMELOG) injection 0-7 Units, 0-7 Units, Subcutaneous, 4x Daily With Meals & Nightly, Ora Pickens MD, 3 Units at 03/06/22 1225  •  levothyroxine (SYNTHROID, LEVOTHROID) tablet 75 mcg, 75 mcg, Oral, Daily, Ora Pickens MD, 75 mcg at 03/06/22 0811  •  polyethylene glycol (MIRALAX) packet 17 g, 17 g, Oral, Daily, Zahida Hsu MD, 17 g at 03/06/22 0811  •  [COMPLETED] Insert peripheral IV, , , Once **AND** sodium chloride 0.9 % flush 10 mL, 10 mL, Intravenous, PRN, Sergio Burgess MD  •  tamsulosin (FLOMAX) 24 hr capsule 0.4 mg, 0.4 mg, Oral, Nightly, Ora Pickens MD, 0.4 mg at 03/05/22 1942  •  torsemide (DEMADEX) tablet 20 mg, 20 mg, Oral, Daily, Damien Elliott MD, 20 mg at 03/06/22 0811  •  zolpidem (AMBIEN) tablet 5 mg, 5 mg, Oral, Nightly PRN, Bob Villalobos III, MD, 5 mg at 03/02/22 2123    ASSESSMENT  Acute kidney injury   Chronic kidney disease stage IV  Elevated troponin with no chest pain  Diabetes mellitus  Hypertension with low BP  Hyperlipidemia  Gastroesophageal reflux disease    PLAN  CPM  Discontinue IV fluids  Adjust diuretics dose  Nephrology consult appreciated  Cardiology and psychiatry to follow patient  Adjust home medications  Stress ulcer DVT prophylaxis  Supportive care   PT/OT  Discussed with nursing staff   Subacute rehab versus home with family support and home health in a..    ORA PICKENS MD

## 2022-03-06 NOTE — PLAN OF CARE
Problem: Adult Inpatient Plan of Care  Goal: Plan of Care Review  Outcome: Ongoing, Progressing  Flowsheets (Taken 3/6/2022 1717)  Progress: improving  Plan of Care Reviewed With: patient  Outcome Evaluation: Pt vitals stable. No c/o pain. Very sleepy all day. Per pt he stays up late at night. Pt calling out for assistance. Labs in am. Possible dc tomorrow Will continue to monitor   Goal Outcome Evaluation:  Plan of Care Reviewed With: patient        Progress: improving  Outcome Evaluation: Pt vitals stable. No c/o pain. Very sleepy all day. Per pt he stays up late at night. Pt calling out for assistance. Labs in am. Possible dc tomorrow Will continue to monitor

## 2022-03-06 NOTE — PLAN OF CARE
Goal Outcome Evaluation:  Plan of Care Reviewed With: patient        Progress: improving  Outcome Evaluation: Pt seen by PT this PM for treatment.  Mod I with all bed mobility today, performs transfer from bed to bedside commode with CGA and no AD, mild instability.  Ambulates 90 feet with RW, cues for upright posture and assistive device use, limited by fatigue.  Pt remains appropriate for skilled therapy to improve strength, coordination, gait, and transfers and to reduce fall risk. Bed alarm not functioning, nurse Candy aware.

## 2022-03-06 NOTE — NURSING NOTE
Access center follow up.    Pt. lying in bed watching t.v. Pt.says he slept very little last night. Appetite good per pt. Depression 4/10. Awaiting placement for SNF. Continues on wellbutrin. Flat affect. Access following.

## 2022-03-07 LAB
ANION GAP SERPL CALCULATED.3IONS-SCNC: 6.9 MMOL/L (ref 5–15)
BASOPHILS # BLD AUTO: 0.02 10*3/MM3 (ref 0–0.2)
BASOPHILS NFR BLD AUTO: 0.3 % (ref 0–1.5)
BUN SERPL-MCNC: 47 MG/DL (ref 8–23)
BUN/CREAT SERPL: 16.2 (ref 7–25)
CALCIUM SPEC-SCNC: 9.1 MG/DL (ref 8.6–10.5)
CHLORIDE SERPL-SCNC: 111 MMOL/L (ref 98–107)
CO2 SERPL-SCNC: 23.1 MMOL/L (ref 22–29)
CREAT SERPL-MCNC: 2.9 MG/DL (ref 0.76–1.27)
DEPRECATED RDW RBC AUTO: 42.9 FL (ref 37–54)
EGFRCR SERPLBLD CKD-EPI 2021: 23.1 ML/MIN/1.73
EOSINOPHIL # BLD AUTO: 0.26 10*3/MM3 (ref 0–0.4)
EOSINOPHIL NFR BLD AUTO: 3.8 % (ref 0.3–6.2)
ERYTHROCYTE [DISTWIDTH] IN BLOOD BY AUTOMATED COUNT: 14.6 % (ref 12.3–15.4)
GLUCOSE BLDC GLUCOMTR-MCNC: 132 MG/DL (ref 70–130)
GLUCOSE BLDC GLUCOMTR-MCNC: 156 MG/DL (ref 70–130)
GLUCOSE BLDC GLUCOMTR-MCNC: 203 MG/DL (ref 70–130)
GLUCOSE BLDC GLUCOMTR-MCNC: 209 MG/DL (ref 70–130)
GLUCOSE SERPL-MCNC: 132 MG/DL (ref 65–99)
HCT VFR BLD AUTO: 30.4 % (ref 37.5–51)
HGB BLD-MCNC: 9.9 G/DL (ref 13–17.7)
IMM GRANULOCYTES # BLD AUTO: 0.04 10*3/MM3 (ref 0–0.05)
IMM GRANULOCYTES NFR BLD AUTO: 0.6 % (ref 0–0.5)
LYMPHOCYTES # BLD AUTO: 1.2 10*3/MM3 (ref 0.7–3.1)
LYMPHOCYTES NFR BLD AUTO: 17.4 % (ref 19.6–45.3)
MCH RBC QN AUTO: 26.5 PG (ref 26.6–33)
MCHC RBC AUTO-ENTMCNC: 32.6 G/DL (ref 31.5–35.7)
MCV RBC AUTO: 81.5 FL (ref 79–97)
MONOCYTES # BLD AUTO: 0.75 10*3/MM3 (ref 0.1–0.9)
MONOCYTES NFR BLD AUTO: 10.9 % (ref 5–12)
NEUTROPHILS NFR BLD AUTO: 4.64 10*3/MM3 (ref 1.7–7)
NEUTROPHILS NFR BLD AUTO: 67 % (ref 42.7–76)
NRBC BLD AUTO-RTO: 0 /100 WBC (ref 0–0.2)
PLATELET # BLD AUTO: 204 10*3/MM3 (ref 140–450)
PMV BLD AUTO: 10.2 FL (ref 6–12)
POTASSIUM SERPL-SCNC: 5 MMOL/L (ref 3.5–5.2)
RBC # BLD AUTO: 3.73 10*6/MM3 (ref 4.14–5.8)
SODIUM SERPL-SCNC: 141 MMOL/L (ref 136–145)
WBC NRBC COR # BLD: 6.91 10*3/MM3 (ref 3.4–10.8)

## 2022-03-07 PROCEDURE — 80048 BASIC METABOLIC PNL TOTAL CA: CPT | Performed by: INTERNAL MEDICINE

## 2022-03-07 PROCEDURE — 63710000001 INSULIN LISPRO (HUMAN) PER 5 UNITS: Performed by: HOSPITALIST

## 2022-03-07 PROCEDURE — 85025 COMPLETE CBC W/AUTO DIFF WBC: CPT | Performed by: HOSPITALIST

## 2022-03-07 PROCEDURE — 97110 THERAPEUTIC EXERCISES: CPT

## 2022-03-07 PROCEDURE — 82962 GLUCOSE BLOOD TEST: CPT

## 2022-03-07 RX ORDER — TORSEMIDE 20 MG/1
40 TABLET ORAL DAILY
Status: DISCONTINUED | OUTPATIENT
Start: 2022-03-07 | End: 2022-03-08 | Stop reason: HOSPADM

## 2022-03-07 RX ADMIN — ATORVASTATIN CALCIUM 10 MG: 10 TABLET, FILM COATED ORAL at 20:31

## 2022-03-07 RX ADMIN — CARVEDILOL 3.12 MG: 3.12 TABLET, FILM COATED ORAL at 08:52

## 2022-03-07 RX ADMIN — APIXABAN 2.5 MG: 2.5 TABLET, FILM COATED ORAL at 08:52

## 2022-03-07 RX ADMIN — FAMOTIDINE 20 MG: 20 TABLET ORAL at 20:31

## 2022-03-07 RX ADMIN — INSULIN LISPRO 2 UNITS: 100 INJECTION, SOLUTION INTRAVENOUS; SUBCUTANEOUS at 22:11

## 2022-03-07 RX ADMIN — BUPROPION HYDROCHLORIDE 300 MG: 300 TABLET, EXTENDED RELEASE ORAL at 06:13

## 2022-03-07 RX ADMIN — ASPIRIN 81 MG: 81 TABLET, COATED ORAL at 08:51

## 2022-03-07 RX ADMIN — CARVEDILOL 3.12 MG: 3.12 TABLET, FILM COATED ORAL at 17:59

## 2022-03-07 RX ADMIN — APIXABAN 2.5 MG: 2.5 TABLET, FILM COATED ORAL at 20:31

## 2022-03-07 RX ADMIN — INSULIN LISPRO 3 UNITS: 100 INJECTION, SOLUTION INTRAVENOUS; SUBCUTANEOUS at 18:00

## 2022-03-07 RX ADMIN — ZOLPIDEM TARTRATE 5 MG: 5 TABLET ORAL at 20:31

## 2022-03-07 RX ADMIN — LEVOTHYROXINE SODIUM 75 MCG: 0.07 TABLET ORAL at 08:52

## 2022-03-07 RX ADMIN — POLYETHYLENE GLYCOL 3350 17 G: 17 POWDER, FOR SOLUTION ORAL at 08:51

## 2022-03-07 RX ADMIN — TORSEMIDE 40 MG: 20 TABLET ORAL at 08:51

## 2022-03-07 RX ADMIN — Medication 1000 UNITS: at 08:51

## 2022-03-07 RX ADMIN — TAMSULOSIN HYDROCHLORIDE 0.4 MG: 0.4 CAPSULE ORAL at 20:31

## 2022-03-07 RX ADMIN — INSULIN LISPRO 3 UNITS: 100 INJECTION, SOLUTION INTRAVENOUS; SUBCUTANEOUS at 12:36

## 2022-03-07 RX ADMIN — FAMOTIDINE 20 MG: 20 TABLET ORAL at 08:51

## 2022-03-07 NOTE — PLAN OF CARE
Goal Outcome Evaluation:  Plan of Care Reviewed With: patient        Progress: improving  Outcome Evaluation: Pt tolerated treatment fair this date. Ambulated 90ft w/ Rw and CGA, w/ cues to stay closer to the walker. Encouraged pt to ambulate again w/ staff later. Plans to d/c to SNF soon.

## 2022-03-07 NOTE — PLAN OF CARE
Goal Outcome Evaluation:  Plan of Care Reviewed With: patient        Progress: no change  Outcome Evaluation: VSS. Continues to c/o tiredness. PRN ambien given - reports better sleep. No c/o pain. Will continue to monitor.

## 2022-03-07 NOTE — PROGRESS NOTES
"Daily progress note    Chief complaint  Doing same  No new complaints  Denies chest pain shortness of breath palpitation     History of present illness  66-year-old white male who is well-known to our service with history of diabetes hypertension hyperlipidemia and chronic kidney disease stage IV presented to [facility] emergency room with generalized weakness and he fell at home.  Patient denies any injury.  Patient does have dizziness lightheadedness but denies any chest pain increase shortness of breath abdominal pain nausea vomiting diarrhea.  Patient work-up in ER revealed acute kidney injury on top of chronic kidney disease stage IV admit for management.  Patient has chronic elevated troponin with no chest pain.     REVIEW OF SYSTEMS  Unremarkable except generalized weakness     PHYSICAL EXAM  Blood pressure 154/81, pulse 69, temperature 97.7 °F (36.5 °C), temperature source Oral, resp. rate 16, height 182.9 cm (72\"), weight 78.9 kg (173 lb 14.4 oz), SpO2 97 %.    Constitutional:       General: He is not in acute distress.  HENT:      Head: Normocephalic and atraumatic.   Eyes:      Pupils: Pupils are equal, round, and reactive to light.   Cardiovascular:      Rate and Rhythm: Normal rate and regular rhythm.      Heart sounds: Normal heart sounds.   Pulmonary:      Effort: Pulmonary effort is normal. No respiratory distress.      Breath sounds: Normal breath sounds.   Abdominal:      Palpations: Abdomen is soft.      Tenderness: There is no abdominal tenderness. There is no guarding or rebound.   Musculoskeletal:         General: Normal range of motion.      Cervical back: Normal range of motion and neck supple.   Skin:     General: Skin is warm and dry.   Neurological:      Mental Status: He is alert and oriented to person, place, and time.      Sensory: Sensation is intact.   Psychiatric:         Mood and Affect: Mood and affect normal.      LAB RESULTS  Lab Results (last 24 hours)     Procedure " Component Value Units Date/Time    POC Glucose Once [[id number]]  (Abnormal) Collected: 03/07/22 1206    Specimen: Blood Updated: 03/07/22 1214     Glucose 203 mg/dL      Comment: Meter: [id number] : [id number] Lory RAMIREZ       Basic Metabolic Panel [[id number]]  (Abnormal) Collected: 03/07/22 0714    Specimen: Blood Updated: 03/07/22 0813     Glucose 132 mg/dL      BUN 47 mg/dL      Creatinine 2.90 mg/dL      Sodium 141 mmol/L      Potassium 5.0 mmol/L      Chloride 111 mmol/L      CO2 23.1 mmol/L      Calcium 9.1 mg/dL      BUN/Creatinine Ratio 16.2     Anion Gap 6.9 mmol/L      eGFR 23.1 mL/min/1.73      Comment: National Kidney Foundation and American Society of Nephrology (ASN) Task Force recommended calculation based on the Chronic Kidney Disease Epidemiology Collaboration (CKD-EPI) equation refit without adjustment for race.       Narrative:      GFR Normal >60  Chronic Kidney Disease <60  Kidney Failure <15      CBC & Differential [[id number]]  (Abnormal) Collected: 03/07/22 0714    Specimen: Blood Updated: 03/07/22 0737    Narrative:      The following orders were created for panel order CBC & Differential.  Procedure                               Abnormality         Status                     ---------                               -----------         ------                     CBC Auto Differential[[id number]]        Abnormal            Final result                 Please view results for these tests on the individual orders.    CBC Auto Differential [[id number]]  (Abnormal) Collected: 03/07/22 0714    Specimen: Blood Updated: 03/07/22 0737     WBC 6.91 10*3/mm3      RBC 3.73 10*6/mm3      Hemoglobin 9.9 g/dL      Hematocrit 30.4 %      MCV 81.5 fL      MCH 26.5 pg      MCHC 32.6 g/dL      RDW 14.6 %      RDW-SD 42.9 fl      MPV 10.2 fL      Platelets 204 10*3/mm3      Neutrophil % 67.0 %      Lymphocyte % 17.4 %      Monocyte % 10.9 %      Eosinophil % 3.8 %      Basophil % 0.3 %      Immature  Grans % 0.6 %      Neutrophils, Absolute 4.64 10*3/mm3      Lymphocytes, Absolute 1.20 10*3/mm3      Monocytes, Absolute 0.75 10*3/mm3      Eosinophils, Absolute 0.26 10*3/mm3      Basophils, Absolute 0.02 10*3/mm3      Immature Grans, Absolute 0.04 10*3/mm3      nRBC 0.0 /100 WBC     POC Glucose Once [[id number]]  (Abnormal) Collected: 03/07/22 0604    Specimen: Blood Updated: 03/07/22 0605     Glucose 132 mg/dL      Comment: Meter: [id number] : [id number] Simone Gisela NA       POC Glucose Once [[id number]]  (Abnormal) Collected: 03/06/22 2158    Specimen: Blood Updated: 03/06/22 2159     Glucose 186 mg/dL      Comment: Meter: [id number] : [id number] Simone Gisela NA       POC Glucose Once [[id number]]  (Abnormal) Collected: 03/06/22 2011    Specimen: Blood Updated: 03/06/22 2024     Glucose 196 mg/dL      Comment: Meter: [id number] : [id number] Ace Lim RN       POC Glucose Once [[id number]]  (Abnormal) Collected: 03/06/22 1657    Specimen: Blood Updated: 03/06/22 1701     Glucose 150 mg/dL      Comment: Meter: [id number] : [id number] Frandy RAMIREZ           Imaging Results (Last 24 Hours)     ** No results found for the last 24 hours. **             ECG 12 Lead  Component   Ref Range & Units 3/1/22 0256 2/3/22 0604 2/2/22 0342 2/1/22 1535 12/15/21 1248 5/20/21 2206   QT Interval   ms 482 P  488  482  491  511  469              HEART RATE= 61  bpm  RR Interval= 988  ms  KS Interval= 68  ms  P Horizontal Axis= 2  deg  P Front Axis= -55  deg  QRSD Interval= 172  ms  QT Interval= 482  ms  QRS Axis= -86  deg  T Wave Axis= 86  deg  - ABNORMAL ECG -  Sinus or ectopic atrial rhythm  Short KS interval  Right bundle branch block  Anteroseptal infarct, age indeterminate             Current Facility-Administered Medications:   •  apixaban (ELIQUIS) tablet 2.5 mg, 2.5 mg, Oral, Q12H, Ahmed, Josue, MD, 2.5 mg at 03/07/22 0852  •  aspirin EC tablet 81 mg, 81 mg, Oral, Daily, Josue Pickens MD, 81 mg at  03/07/22 0851  •  atorvastatin (LIPITOR) tablet 10 mg, 10 mg, Oral, Nightly, Ora Pickens MD, 10 mg at 03/06/22 2019  •  buPROPion XL (WELLBUTRIN XL) 24 hr tablet 300 mg, 300 mg, Oral, QAM, Bob Villalobos III, MD, 300 mg at 03/07/22 0613  •  carvedilol (COREG) tablet 3.125 mg, 3.125 mg, Oral, BID With Meals, Ora Pickens MD, 3.125 mg at 03/07/22 0852  •  cholecalciferol (VITAMIN D3) tablet 1,000 Units, 1,000 Units, Oral, Daily, Ora Pickens MD, 1,000 Units at 03/07/22 0851  •  famotidine (PEPCID) tablet 20 mg, 20 mg, Oral, BID, Ora Pickens MD, 20 mg at 03/07/22 0851  •  insulin lispro (ADMELOG) injection 0-7 Units, 0-7 Units, Subcutaneous, 4x Daily With Meals & Nightly, Ora Pickens MD, 3 Units at 03/07/22 1236  •  levothyroxine (SYNTHROID, LEVOTHROID) tablet 75 mcg, 75 mcg, Oral, Daily, Ora Pickens MD, 75 mcg at 03/07/22 0852  •  polyethylene glycol (MIRALAX) packet 17 g, 17 g, Oral, Daily, Zahida Hsu MD, 17 g at 03/07/22 0851  •  [COMPLETED] Insert peripheral IV, , , Once **AND** sodium chloride 0.9 % flush 10 mL, 10 mL, Intravenous, PRN, Sergio Burgess MD  •  tamsulosin (FLOMAX) 24 hr capsule 0.4 mg, 0.4 mg, Oral, Nightly, Ora Pickens MD, 0.4 mg at 03/06/22 2019  •  torsemide (DEMADEX) tablet 40 mg, 40 mg, Oral, Daily, Cameron Olguin MD, 40 mg at 03/07/22 0851  •  zolpidem (AMBIEN) tablet 5 mg, 5 mg, Oral, Nightly PRN, Bob Villalobos III MD, 5 mg at 03/06/22 2351    ASSESSMENT  Acute kidney injury   Chronic kidney disease stage IV  Elevated troponin with no chest pain  Diabetes mellitus  Hypertension with low BP  Hyperlipidemia  Gastroesophageal reflux disease    PLAN  CPM  Adjust diuretics dose  Nephrology to follow patient  Adjust home medications  Stress ulcer DVT prophylaxis  Supportive care   PT/OT  Discussed with nursing staff   Subacute rehab once bed available    ORA PICKENS MD

## 2022-03-07 NOTE — THERAPY TREATMENT NOTE
Patient Name: Carlito Mo  : 1955    MRN: [id number]                              Today's Date: 3/7/2022       Admit Date: 3/1/2022    Visit Dx:     ICD-10-CM ICD-9-CM   1. Generalized weakness  R53.1 780.79   2. Acute on chronic renal insufficiency  N28.9 593.9    N18.9 585.9   3. Fall, initial encounter  W19.XXXA E888.9   4. Elevated troponin  R77.8 790.6     Patient Active Problem List   Diagnosis   • Major depressive disorder with single episode, in partial remission ([facility])   • Other hyperlipidemia   • Vitamin D deficiency   • Erectile dysfunction   • Chronic fatigue   • Type 2 diabetes mellitus with ophthalmic complication ([facility])   • Skin lesion of chest wall   • Slow transit constipation   • Benign prostatic hyperplasia with nocturia   • History of basal cell carcinoma   • High blood pressure associated with diabetes ([facility])   • Acquired hypothyroidism   • Hypertensive urgency   • Recurrent strokes ([facility])   • Urinary retention   • Pneumonia   • Acute on chronic combined systolic and diastolic congestive heart failure ([facility])   • Acute respiratory failure with hypoxia ([facility])   • Suspected sleep apnea   • Pleural effusion   • YAW (obstructive sleep apnea)   • Coronary artery disease involving native coronary artery of native heart without angina pectoris   • Chronically elevated troponin   • Colon cancer screening   • Enlarged lymph nodes   • Functional gait abnormality   • History of myocardial infarction   • Hospital discharge follow-up   • Insomnia   • Iron deficiency anemia   • Major depression, recurrent ([facility])   • Anemia, chronic renal failure, stage 3 (moderate) ([facility])   • Essential hypertension   • Adverse effect of iron and its compounds, initial encounter   • Acute on chronic renal insufficiency   • Debility   • Falls frequently   • Acute pain of right shoulder   • Acute on chronic anemia   • Heme positive stool   • Neurogenic orthostatic hypotension ([facility])   • Symptomatic anemia   • History of colon  polyps   • Helicobacter pylori gastritis   • Esophagitis   • Sleep related hypoxia   • Embolic stroke (HCC)   • Patent foramen ovale   • Pleural effusion, bilateral   • Cardiomyopathy (HCC)   • Lower abdominal pain   • Diarrhea   • CKD (chronic kidney disease) stage 4, GFR 15-29 ml/min (HCC)   • Hypertension not at goal   • Memory loss due to medical condition   • Generalized weakness     Past Medical History:   Diagnosis Date   • Acquired hypothyroidism    • Acute congestive heart failure (HCC)    • Acute respiratory failure with hypoxia (HCC)    • Acute sinusitis    • SYLVAIN (acute kidney injury) (HCC)     on CKD   • Anemia    • Arthritis    • CAD (coronary artery disease)    • Cancer (HCC)     skin   • Chronic combined systolic and diastolic congestive heart failure (HCC)    • Chronic ischemic heart disease    • CKD (chronic kidney disease)    • COPD (chronic obstructive pulmonary disease) (HCC)    • Depression    • Diabetes mellitus type 2, uncontrolled, without complications    • Disease of thyroid gland    • Elevated cholesterol    • Fatigue    • Frequent PVCs    • GERD (gastroesophageal reflux disease)    • Heart attack (HCC)    • History of coronary artery disease     with remote history of bypass surgery in 2001   • History of transfusion    • Hyperglycemia    • Hyperlipidemia    • Hypertension    • Hypertensive encephalopathy    • Mental status change     Acute   • YAW on CPAP    • Pleural effusion    • Pneumonia    • RBBB (right bundle branch block)    • Screening for prostate cancer 2011   • Stroke (HCC)    • TIA (transient ischemic attack)    • Type 2 diabetes mellitus (HCC)    • Urinary retention    • Vitamin D deficiency      Past Surgical History:   Procedure Laterality Date   • APPENDECTOMY N/A 02/14/2016    Dr. Alexey Dodson   • COLONOSCOPY      over 20 years ago.  no polyps    • COLONOSCOPY N/A 9/18/2018    Procedure: COLONOSCOPY;  Surgeon: Jose Enrique Louie MD;  Location: [facility] ENDOSCOPY;   Service: Gastroenterology   • COLONOSCOPY N/A 9/19/2018    IH, EH, polyps (TAs w/low grade dysplasia)   • COLONOSCOPY N/A 6/1/2020    Procedure: COLONOSCOPY;  Surgeon: Jose Enrique Louie MD;  Location: [facility] ENDOSCOPY;  Service: Gastroenterology;  Laterality: N/A;  Pre op-Anemia, Melena, History of Polyps  Post op-To Cecum/TI, Polyp, Poor Prep   • CORONARY ARTERY BYPASS GRAFT  11/2001   • ENDOSCOPY N/A 5/29/2020    Procedure: ESOPHAGOGASTRODUODENOSCOPY with biopsies;  Surgeon: Amanda Lovelace MD;  Location: [facility] ENDOSCOPY;  Service: Gastroenterology;  Laterality: N/A;  pre-anemia, dark stools  post-esophagitis, hiatal hernia   • ENDOSCOPY N/A 9/15/2020    Procedure: ESOPHAGOGASTRODUODENOSCOPY WITH BIOPSIES;  Surgeon: Jose Enrique Louie MD;  Location: [facility] ENDOSCOPY;  Service: Gastroenterology;  Laterality: N/A;  pre: history of melena and esophagitis  post: mild esophagitis and gastritis, small hiatal hernia   • HERNIA REPAIR Left 12/05/2019   • TONSILLECTOMY     • VASECTOMY        General Information     Row Name 03/07/22 1607          Physical Therapy Time and Intention    Document Type therapy note (daily note)  -     Mode of Treatment physical therapy  -     Row Name 03/07/22 1607          General Information    Existing Precautions/Restrictions fall  -     Row Name 03/07/22 1607          Cognition    Orientation Status (Cognition) oriented x 3  -           User Key  (r) = Recorded By, (t) = Taken By, (c) = Cosigned By    Initials Name Provider Type     Amanda Burgess PTA Physical Therapy Assistant               Mobility     Row Name 03/07/22 1608          Bed Mobility    Bed Mobility supine-sit;sit-supine  -     Supine-Sit Arroyo (Bed Mobility) modified independence  -     Sit-Supine Arroyo (Bed Mobility) modified independence  -     Assistive Device (Bed Mobility) bed rails;head of bed elevated  -     Row Name 03/07/22 1608          Sit-Stand Transfer     Sit-Stand Richardson (Transfers) standby assist  -     Assistive Device (Sit-Stand Transfers) walker, front-wheeled  -SM     Row Name 03/07/22 1608          Gait/Stairs (Locomotion)    Richardson Level (Gait) contact guard  -     Assistive Device (Gait) walker, front-wheeled  -SM     Distance in Feet (Gait) 90  -SM     Deviations/Abnormal Patterns (Gait) eli decreased;stride length decreased  -     Bilateral Gait Deviations forward flexed posture  -     Comment, (Gait/Stairs) cues to stay closer to the walker  -           User Key  (r) = Recorded By, (t) = Taken By, (c) = Cosigned By    Initials Name Provider Type    Amanda Alegria PTA Physical Therapy Assistant               Obj/Interventions    No documentation.                Goals/Plan    No documentation.                Clinical Impression     Row Name 03/07/22 1612          Pain    Pretreatment Pain Rating 0/10 - no pain  -SM     Posttreatment Pain Rating 0/10 - no pain  -SM     Row Name 03/07/22 1612          Positioning and Restraints    Pre-Treatment Position in bed  -     Post Treatment Position bed  -     In Bed supine;call light within reach;encouraged to call for assist;exit alarm on  -           User Key  (r) = Recorded By, (t) = Taken By, (c) = Cosigned By    Initials Name Provider Type    Amanda Alegria PTA Physical Therapy Assistant               Outcome Measures     Row Name 03/07/22 1613          How much help from another person do you currently need...    Turning from your back to your side while in flat bed without using bedrails? 3  -SM     Moving from lying on back to sitting on the side of a flat bed without bedrails? 3  -SM     Moving to and from a bed to a chair (including a wheelchair)? 3  -SM     Standing up from a chair using your arms (e.g., wheelchair, bedside chair)? 3  -SM     Climbing 3-5 steps with a railing? 3  -SM     To walk in hospital room? 3  -SM     AM-PAC 6 Clicks Score (PT) 18   -     Row Name 03/07/22 1613          Functional Assessment    Outcome Measure Options AM-PAC 6 Clicks Basic Mobility (PT)  -           User Key  (r) = Recorded By, (t) = Taken By, (c) = Cosigned By    Initials Name Provider Type     Amanda Burgess PTA Physical Therapy Assistant                             Physical Therapy Education                 Title: PT OT SLP Therapies (In Progress)     Topic: Physical Therapy (In Progress)     Point: Mobility training (Done)     Learning Progress Summary           Patient Acceptance, E,TB,D, VU,NR by  at 3/7/2022 1614    Acceptance, E, VU by  at 3/6/2022 1601    Acceptance, E,TB, VU,NR by  at 3/4/2022 1204                   Point: Home exercise program (Not Started)     Learner Progress:  Not documented in this visit.          Point: Body mechanics (Done)     Learning Progress Summary           Patient Acceptance, E,TB,D, VU,NR by  at 3/7/2022 1614    Acceptance, E, VU by JESSICA at 3/6/2022 1601                   Point: Precautions (Done)     Learning Progress Summary           Patient Acceptance, E,TB,D, VU,NR by  at 3/7/2022 1614    Acceptance, E, VU by  at 3/6/2022 1601                               User Key     Initials Effective Dates Name Provider Type Discipline     03/07/18 -  Amanda Burgess PTA Physical Therapy Assistant PT    CB 10/22/21 -  Nikky Katz, PT Physical Therapist PT    JESSICA 08/24/21 -  Beatriz Robles, PT Physical Therapist PT              PT Recommendation and Plan     Plan of Care Reviewed With: patient  Progress: improving  Outcome Evaluation: Pt tolerated treatment fair this date. Ambulated 90ft w/ Rw and CGA, w/ cues to stay closer to the walker. Encouraged pt to ambulate again w/ staff later. Plans to d/c to SNF soon.     Time Calculation:    PT Charges     Row Name 03/07/22 1616             Time Calculation    Start Time 1549  -      Stop Time 1605  -      Time Calculation (min) 16 min  -      PT Received On  03/07/22  -MASON      PT - Next Appointment 03/08/22  -MASON            User Key  (r) = Recorded By, (t) = Taken By, (c) = Cosigned By    Initials Name Provider Type    Amanda Alegria PTA Physical Therapy Assistant              Therapy Charges for Today     Code Description Service Date Service Provider Modifiers Qty    39778493034 HC PT THER PROC EA 15 MIN 3/7/2022 Amanda Burgess PTA GP 1          PT G-Codes  Outcome Measure Options: AM-PAC 6 Clicks Basic Mobility (PT)  AM-PAC 6 Clicks Score (PT): 18  AM-PAC 6 Clicks Score (OT): 17    Amanda Burgess PTA  3/7/2022

## 2022-03-07 NOTE — PROGRESS NOTES
[facility] Progress Note      Patient Name: Carlito oM  : 1955  MRN: [id number]  Primary Care Physician:  Florencia Caballero MD  Date of admission: 3/1/2022    Subjective     Interval History:   Follow up SYLVAIN on CKD IV.     The patient is feeling much better, denies chest pain or shortness of air, no orthopnea or PND, no nausea or vomiting, no dysuria or gross hematuria.    Review of Systems:   As noted above    Objective     Vitals:   Temp:  [97.8 °F (36.6 °C)-98.2 °F (36.8 °C)] 97.8 °F (36.6 °C)  Heart Rate:  [67-92] 67  Resp:  [16-18] 16  BP: (145-173)/(78-99) 173/99    Intake/Output Summary (Last 24 hours) at 3/7/2022 0813  Last data filed at 3/7/2022 0700  Gross per 24 hour   Intake 0 ml   Output 1850 ml   Net -1850 ml       Physical Exam:    General Appearance: alert, oriented x 3, no acute distress . Chronically ill.   Skin: warm and dry  HEENT: oral mucosa normal, nonicteric sclera  Neck: supple, no JVD  Lungs: Clear to auscultation   Heart: RRR, normal S1 and S2. No rub .  Abdomen: soft, nontender, non-distended.  Normoactive bowels  Extremities: no edema.  Neuro: normal speech and mental status     Scheduled Meds:     apixaban, 2.5 mg, Oral, Q12H  aspirin, 81 mg, Oral, Daily  atorvastatin, 10 mg, Oral, Nightly  buPROPion XL, 300 mg, Oral, QAM  carvedilol, 3.125 mg, Oral, BID With Meals  cholecalciferol, 1,000 Units, Oral, Daily  famotidine, 20 mg, Oral, BID  insulin lispro, 0-7 Units, Subcutaneous, 4x Daily With Meals & Nightly  levothyroxine, 75 mcg, Oral, Daily  polyethylene glycol, 17 g, Oral, Daily  tamsulosin, 0.4 mg, Oral, Nightly  torsemide, 20 mg, Oral, Daily      IV Meds:        Results Reviewed:   I have personally reviewed the results from the time of this admission to 3/7/2022 08:13 EST     Results from last 7 days   Lab Units 22  0714 22  0604 22  0527 22  0425 22  0622 22  0402   SODIUM mmol/L 141 139 140   < > 140 138    POTASSIUM mmol/L 5.0 5.0 4.6   < > 3.9 4.1   CHLORIDE mmol/L 111* 110* 109*   < > 107 105   CO2 mmol/L 23.1 23.0 24.0   < > 20.6* 18.7*   BUN mg/dL 47* 51* 48*   < > 68* 68*   CREATININE mg/dL 2.90* 2.71* 2.71*   < > 3.88* 4.31*   CALCIUM mg/dL 9.1 8.4* 8.2*   < > 9.2 8.8   BILIRUBIN mg/dL  --   --   --   --  0.2 0.2   ALK PHOS U/L  --   --   --   --  89 97   ALT (SGPT) U/L  --   --   --   --  49* 60*   AST (SGOT) U/L  --   --   --   --  22 33   GLUCOSE mg/dL 132* 128* 105*   < > 81 242*    < > = values in this interval not displayed.       Estimated Creatinine Clearance: 28 mL/min (A) (by C-G formula based on SCr of 2.9 mg/dL (H)).    Results from last 7 days   Lab Units 03/05/22  0527 03/04/22  0543 03/03/22 0425 03/02/22  0622   MAGNESIUM mg/dL 1.8  --   --  2.0   PHOSPHORUS mg/dL 3.6 3.8 4.3  --        Results from last 7 days   Lab Units 03/05/22  0527 03/02/22  0622   URIC ACID mg/dL 5.7 9.3*       Results from last 7 days   Lab Units 03/07/22  0714 03/06/22  0604 03/04/22  0543 03/03/22 0425 03/02/22  0622   WBC 10*3/mm3 6.91 6.68 6.21 5.47 6.68   HEMOGLOBIN g/dL 9.9* 9.5* 10.6* 10.4* 11.5*   PLATELETS 10*3/mm3 204 205 216 224 250       Results from last 7 days   Lab Units 03/01/22  0402   INR  1.07       Assessment / Plan     ASSESSMENT:  1.  SYLVAIN on CKD IV> Baseline crt in 3's.  Volume depletion due to diuresis. Improved with IVF, creatinine today 2.9, stable, electrolyte within acceptable range   2. Chronic combined systolic and diastolic heart failure. Compensated.  3. DM2  4. History of CVA  5. CAD sp CABG.   6.  Anemia of chronic kidney disease, hemoglobin today 9.9, will continue to monitor    PLAN:  1.  Continue the same treatment  2.  The patient will need to be on a higher dose of torsemide with his history of congestive heart failure, will increase torsemide to 40 mg daily  3.  Surveillance labs      Cameron Olguin MD  03/07/22  08:13 [facility]    [facility]  [phone]

## 2022-03-07 NOTE — PLAN OF CARE
Problem: Adult Inpatient Plan of Care  Goal: Plan of Care Review  Outcome: Ongoing, Progressing  Flowsheets (Taken 3/7/2022 5024)  Plan of Care Reviewed With: patient  Outcome Evaluation: up to [facility], working with PT, awaiting placement for rehab, vss, will continue to monitor

## 2022-03-07 NOTE — PROGRESS NOTES
Discharge Planning Assessment  [facility]     Patient Name: Carlito Mo  MRN: [id number]  Today's Date: 3/7/2022    Admit Date: 3/1/2022     Discharge Needs Assessment    No documentation.                Discharge Plan     Row Name 03/07/22 1528       Plan    Plan skilled rehab [facility] has accepted waiting on a bed    Plan Comments Spoke with patient at bedside he is agreeable with skilled nursing rehab at [facility] vs [facility]. Waiting on Shanna she stated [facility] has beds she is going to call CCP when she has heard from [facility] to accept the patient. Yunior BENZ              Continued Care and Services - Admitted Since 3/1/2022     Destination     Service Provider Request Status Selected Services Address Phone Fax Patient Preferred    [facility]  Accepted N/A [address] [facility] [address] [phone] --    [facility]  Pending - Request Sent N/A [address] [facility] [address] [phone] --    [facility]  Pending - [facility] Sent N/A [address] [facility] [address] [phone] --    [facility]  Declined  facility not accepting pts at this time N/A [address] [facility] [address] [phone] --    [facility]  Declined  not accepting pts at this time N/A [address] [address] [phone] --          Home Medical Care Coordination complete.    Service Provider Request Status Selected Services Address Phone Fax Patient Preferred    [facility]   Selected Home Health Services [address] [facility] [address] [phone] --     [facility]  Declined N/A [address] [id number] [phone] --              Expected Discharge Date and Time      Expected Discharge Date Expected Discharge Time    Mar 8, 2022          Demographic Summary    No documentation.                Functional Status    No documentation.                Psychosocial    No documentation.                Abuse/Neglect    No documentation.                Legal    No documentation.                Substance Abuse    No documentation.                Patient Forms    No documentation.                   Norah Wei RN

## 2022-03-07 NOTE — PROGRESS NOTES
Discharge Planning Assessment  [facility]     Patient Name: Carlito Mo  MRN: [id number]  Today's Date: 3/7/2022    Admit Date: 3/1/2022     Discharge Needs Assessment    No documentation.                Discharge Plan     Row Name 03/07/22 1637       Plan    Plan [facility] skilled rehab has a bed available tomorrow    Plan Comments Call received from Shanna/[facility] can accept and will have a bed available tomorrow. Call placed to spouse she is able to transport she is agreeable with [facility] for skilled nursing.  Yunior BENZ    Row Name 03/07/22 1528       Plan    Plan skilled rehab [facility] has accepted waiting on a bed    Plan Comments Spoke with patient at bedside he is agreeable with skilled nursing rehab at [facility] vs [facility]. Waiting on Shanna she stated [facility] has beds she is going to call CCP when she has heard from [facility] to accept the patient. Yunior BENZ              Continued Care and Services - Admitted Since 3/1/2022     Destination     Service Provider Request Status Selected Services Address Phone Fax Patient Preferred    [facility]  Accepted N/A [address] [facility] [address] [phone] --    [facility]  Accepted N/A [address] [facility] [address] [phone] --    [facility]  Pending - Request Sent N/A [address] [facility] [address] [phone] --    [facility]  Declined  facility not accepting pts at this time N/A [address] [facility] [address] [phone] --    [facility]  Declined  not accepting pts at this time N/A [address] [facility] [address] [phone] --          Home Medical Care Coordination complete.    Service Provider Request Status Selected Services Address Phone Fax Patient Preferred     [facility] [address] [facility], [facility] [address] [phone] --     [facility]  Declined N/A [address] [address] [phone] --              Expected Discharge Date and Time     Expected Discharge Date Expected Discharge Time    Mar 8, 2022          Demographic Summary    No documentation.                Functional Status    No documentation.                Psychosocial    No documentation.                Abuse/Neglect    No documentation.                Legal    No documentation.                Substance Abuse    No documentation.                Patient Forms    No documentation.                   Norah Wei, RN

## 2022-03-07 NOTE — NURSING NOTE
Access center follow up note. Pt is sitting in chair, talking on the phone to his wife. Pt has already received resources for psychiatry follow up after discharge. Pt states he feels like he is doing better, and slept well last night.  Access will sign off. Please call if pt requests.

## 2022-03-08 VITALS
HEART RATE: 77 BPM | TEMPERATURE: 98.4 F | OXYGEN SATURATION: 96 % | WEIGHT: 172.3 LBS | DIASTOLIC BLOOD PRESSURE: 90 MMHG | SYSTOLIC BLOOD PRESSURE: 158 MMHG | RESPIRATION RATE: 18 BRPM | HEIGHT: 72 IN | BODY MASS INDEX: 23.34 KG/M2

## 2022-03-08 LAB
ALBUMIN SERPL-MCNC: 2.9 G/DL (ref 3.5–5.2)
ANION GAP SERPL CALCULATED.3IONS-SCNC: 8.9 MMOL/L (ref 5–15)
BUN SERPL-MCNC: 52 MG/DL (ref 8–23)
BUN/CREAT SERPL: 19.6 (ref 7–25)
CALCIUM SPEC-SCNC: 8.8 MG/DL (ref 8.6–10.5)
CHLORIDE SERPL-SCNC: 108 MMOL/L (ref 98–107)
CO2 SERPL-SCNC: 23.1 MMOL/L (ref 22–29)
CREAT SERPL-MCNC: 2.65 MG/DL (ref 0.76–1.27)
EGFRCR SERPLBLD CKD-EPI 2021: 25.8 ML/MIN/1.73
GLUCOSE BLDC GLUCOMTR-MCNC: 123 MG/DL (ref 70–130)
GLUCOSE BLDC GLUCOMTR-MCNC: 197 MG/DL (ref 70–130)
GLUCOSE SERPL-MCNC: 134 MG/DL (ref 65–99)
MAGNESIUM SERPL-MCNC: 2 MG/DL (ref 1.6–2.4)
PHOSPHATE SERPL-MCNC: 3.9 MG/DL (ref 2.5–4.5)
POTASSIUM SERPL-SCNC: 5.3 MMOL/L (ref 3.5–5.2)
SODIUM SERPL-SCNC: 140 MMOL/L (ref 136–145)
URATE SERPL-MCNC: 5.1 MG/DL (ref 3.4–7)

## 2022-03-08 PROCEDURE — 84550 ASSAY OF BLOOD/URIC ACID: CPT | Performed by: INTERNAL MEDICINE

## 2022-03-08 PROCEDURE — 63710000001 INSULIN LISPRO (HUMAN) PER 5 UNITS: Performed by: HOSPITALIST

## 2022-03-08 PROCEDURE — 82962 GLUCOSE BLOOD TEST: CPT

## 2022-03-08 PROCEDURE — 83735 ASSAY OF MAGNESIUM: CPT | Performed by: INTERNAL MEDICINE

## 2022-03-08 PROCEDURE — 80069 RENAL FUNCTION PANEL: CPT | Performed by: INTERNAL MEDICINE

## 2022-03-08 RX ORDER — BUPROPION HYDROCHLORIDE 300 MG/1
300 TABLET ORAL EVERY MORNING
Qty: 30 TABLET | Refills: 0 | Status: SHIPPED | OUTPATIENT
Start: 2022-03-09 | End: 2022-07-29 | Stop reason: HOSPADM

## 2022-03-08 RX ORDER — TORSEMIDE 20 MG/1
40 TABLET ORAL DAILY
Qty: 60 TABLET | Refills: 0 | Status: SHIPPED | OUTPATIENT
Start: 2022-03-09 | End: 2022-06-06 | Stop reason: HOSPADM

## 2022-03-08 RX ORDER — FAMOTIDINE 20 MG/1
20 TABLET, FILM COATED ORAL 2 TIMES DAILY
Qty: 60 TABLET | Refills: 0 | Status: SHIPPED | OUTPATIENT
Start: 2022-03-08 | End: 2022-04-07

## 2022-03-08 RX ORDER — POLYETHYLENE GLYCOL 3350 17 G/17G
17 POWDER, FOR SOLUTION ORAL DAILY
Qty: 510 G | Refills: 0 | Status: SHIPPED | OUTPATIENT
Start: 2022-03-09 | End: 2022-04-08

## 2022-03-08 RX ORDER — ECHINACEA PURPUREA EXTRACT 125 MG
1 TABLET ORAL AS NEEDED
Status: DISCONTINUED | OUTPATIENT
Start: 2022-03-08 | End: 2022-03-08

## 2022-03-08 RX ADMIN — ASPIRIN 81 MG: 81 TABLET, COATED ORAL at 08:12

## 2022-03-08 RX ADMIN — SODIUM ZIRCONIUM CYCLOSILICATE 10 G: 10 POWDER, FOR SUSPENSION ORAL at 11:08

## 2022-03-08 RX ADMIN — FAMOTIDINE 20 MG: 20 TABLET ORAL at 08:12

## 2022-03-08 RX ADMIN — APIXABAN 2.5 MG: 2.5 TABLET, FILM COATED ORAL at 08:12

## 2022-03-08 RX ADMIN — LEVOTHYROXINE SODIUM 75 MCG: 0.07 TABLET ORAL at 08:12

## 2022-03-08 RX ADMIN — POLYETHYLENE GLYCOL 3350 17 G: 17 POWDER, FOR SOLUTION ORAL at 08:12

## 2022-03-08 RX ADMIN — INSULIN LISPRO 2 UNITS: 100 INJECTION, SOLUTION INTRAVENOUS; SUBCUTANEOUS at 12:17

## 2022-03-08 RX ADMIN — BUPROPION HYDROCHLORIDE 300 MG: 300 TABLET, EXTENDED RELEASE ORAL at 06:16

## 2022-03-08 RX ADMIN — Medication 1000 UNITS: at 08:12

## 2022-03-08 RX ADMIN — TORSEMIDE 40 MG: 20 TABLET ORAL at 08:12

## 2022-03-08 RX ADMIN — CARVEDILOL 3.12 MG: 3.12 TABLET, FILM COATED ORAL at 08:12

## 2022-03-08 NOTE — PLAN OF CARE
Goal Outcome Evaluation:  Plan of Care Reviewed With: patient        Progress: no change  Outcome Evaluation: VSS. Patient did well tonight. Slept most of night - PRN ambien given. No c/o pain. Will continue to monitor.

## 2022-03-08 NOTE — DISCHARGE PLACEMENT REQUEST
"Carlito Mo (66 y.o. Male)             Date of Birth   1955    Social Security Number       Address   [address] [facility] [address]    Home Phone   [phone]    MRN   [id number]       [facility]    Marital Status                               Admission Date   3/1/22    Admission Type   Emergency    Admitting Provider   Josue Pickens MD    Attending Provider   Josue Pickens MD    Department, Room/Bed   [address], [address]1       Discharge Date       Discharge Disposition   Skilled Nursing Facility (DC - External)    Discharge Destination                               Attending Provider: Josue Pickens MD    Allergies: Prozac [Fluoxetine Hcl]    Isolation: None   Infection: None   Code Status: CPR   Advance Care Planning Activity    Ht: 182.9 cm (72\")   Wt: 78.2 kg (172 lb 4.8 oz)    Admission Cmt: None   Principal Problem: Generalized weakness [R53.1]                 Active Insurance as of 3/1/2022     Primary Coverage     Payor Plan Insurance Group Employer/Plan Group    MEDICARE MEDICARE A & B      Payor Plan Address Payor Plan Phone Number Payor Plan Fax Number Effective Dates    [address] [phone]  5/1/2020 - None Entered    [facility] [id number]       Subscriber Name Subscriber Birth Date Member ID       CARLITO [place] 1955 [id number]           Secondary Coverage     Payor Plan Insurance Group Employer/Plan Group    [place]      Payor Plan Address Payor Plan Phone Number Payor Plan Fax Number Effective Dates    [address] [phone]  2/1/2022 - None Entered    [place] [address]       Subscriber Name Subscriber Birth Date Member ID       CARLITO MO 1955 [id number]                 Emergency Contacts      (Rel.) Home Phone Work Phone Mobile Phone    Lissette Mo (Spouse) [phone] -- --                 Discharge Summary      Josue Pickens MD at 03/08/22 1235        Discharge summary    Date of " admission 3/1/2022  Date of discharge 3/8/2022    Final diagnosis  Acute kidney injury resolved  Chronic kidney disease stage IV  Elevated troponin   Diabetes mellitus  Hypertension   Hyperlipidemia  Gastroesophageal reflux disease    Discharge medications    Current Facility-Administered Medications:   •  apixaban (ELIQUIS) tablet 2.5 mg, 2.5 mg, Oral, Q12H, Josue Pickens MD, 2.5 mg at 03/08/22 0812  •  aspirin EC tablet 81 mg, 81 mg, Oral, Daily, Josue Pickens MD, 81 mg at 03/08/22 0812  •  atorvastatin (LIPITOR) tablet 10 mg, 10 mg, Oral, Nightly, Josue Pickens MD, 10 mg at 03/07/22 2031  •  buPROPion XL (WELLBUTRIN XL) 24 hr tablet 300 mg, 300 mg, Oral, Griselda MUIR Charles Brook III, MD, 300 mg at 03/08/22 0616  •  carvedilol (COREG) tablet 3.125 mg, 3.125 mg, Oral, BID With Meals, Josue Pickens MD, 3.125 mg at 03/08/22 0812  •  cholecalciferol (VITAMIN D3) tablet 1,000 Units, 1,000 Units, Oral, Daily, Josue Pickens MD, 1,000 Units at 03/08/22 0812  •  famotidine (PEPCID) tablet 20 mg, 20 mg, Oral, BID, Josue Pickens MD, 20 mg at 03/08/22 0812  •  insulin lispro (ADMELOG) injection 0-7 Units, 0-7 Units, Subcutaneous, 4x Daily With Meals & Nightly, Josue Pickens MD, 2 Units at 03/08/22 1217  •  levothyroxine (SYNTHROID, LEVOTHROID) tablet 75 mcg, 75 mcg, Oral, Daily, Josue Pickens MD, 75 mcg at 03/08/22 0812  •  polyethylene glycol (MIRALAX) packet 17 g, 17 g, Oral, Daily, Zahida Hsu MD, 17 g at 03/08/22 0812  •  [COMPLETED] Insert peripheral IV, , , Once **AND** sodium chloride 0.9 % flush 10 mL, 10 mL, Intravenous, PRN, Sergio Burgess MD  •  tamsulosin (FLOMAX) 24 hr capsule 0.4 mg, 0.4 mg, Oral, Nightly, Josue Pickens MD, 0.4 mg at 03/07/22 2031  •  torsemide (DEMADEX) tablet 40 mg, 40 mg, Oral, Daily, SharifCameron MD, 40 mg at 03/08/22 0812     Consults obtained  Nephrology  Cardiology  Psychiatry  Nutrition    Procedures  None    Hospital course  66-year-old white male with  multiple medical problems including chronic kidney disease stage IV admitted to emergency room with generalized weakness and fell at home.  Patient work-up revealed acute kidney injury on top of chronic kidney disease admit for management.  Patient also found to be orthostatic.  Patient diuretic was held and received fluids and followed by nephrology and cardiology.  Patient has very slow improvement and back to baseline with a BUN of 52 creatinine 2.56 and his Demadex restarted.  Patient blood pressure also stabilized.  Patient does not require hemodialysis at this time.  Patient is very weak and agreeable to go to subacute rehab for PT OT nursing care.  Patient also followed by psychiatry and adjusted as Wellbutrin dose.    Discharge diet regular    Activity per PT OT    Medication as above    Further care per accepting physician at subacute rehab and follow-up with nephrology cardiology and psychiatry per the instruction and take medication as directed    ORA MCDONALD MD    Electronically signed by Ora Mcdonald MD at 03/08/22 1239       Discharge Order (From admission, onward)     Start     Ordered    03/08/22 1234  Discharge patient  Once        Expected Discharge Date: 03/08/22    Discharge Disposition: Skilled Nursing Facility (DC - External)    Physician of Record for Attribution - Please select from Treatment Team: ORA MCDONALD [[id number]]    Review needed by CMO to determine Physician of Record: No       Question Answer Comment   Physician of Record for Attribution - Please select from Treatment Team ORA MCDONALD    Review needed by CMO to determine Physician of Record No        03/08/22 0345

## 2022-03-08 NOTE — PROGRESS NOTES
"Daily progress note    Chief complaint  Doing better  No new complaints  Denies chest pain shortness of breath palpitation     History of present illness  66-year-old white male who is well-known to our service with history of diabetes hypertension hyperlipidemia and chronic kidney disease stage IV presented to [facility] emergency room with generalized weakness and he fell at home.  Patient denies any injury.  Patient does have dizziness lightheadedness but denies any chest pain increase shortness of breath abdominal pain nausea vomiting diarrhea.  Patient work-up in ER revealed acute kidney injury on top of chronic kidney disease stage IV admit for management.  Patient has chronic elevated troponin with no chest pain.     REVIEW OF SYSTEMS  Unremarkable      PHYSICAL EXAM  Blood pressure 158/90, pulse 77, temperature 98.4 °F (36.9 °C), temperature source Oral, resp. rate 18, height 182.9 cm (72\"), weight 78.2 kg (172 lb 4.8 oz), SpO2 96 %.    Constitutional:       General: He is not in acute distress.  HENT:      Head: Normocephalic and atraumatic.   Eyes:      Pupils: Pupils are equal, round, and reactive to light.   Cardiovascular:      Rate and Rhythm: Normal rate and regular rhythm.      Heart sounds: Normal heart sounds.   Pulmonary:      Effort: Pulmonary effort is normal. No respiratory distress.      Breath sounds: Normal breath sounds.   Abdominal:      Palpations: Abdomen is soft.      Tenderness: There is no abdominal tenderness. There is no guarding or rebound.   Musculoskeletal:         General: Normal range of motion.      Cervical back: Normal range of motion and neck supple.   Skin:     General: Skin is warm and dry.   Neurological:      Mental Status: He is alert and oriented to person, place, and time.      Sensory: Sensation is intact.   Psychiatric:         Mood and Affect: Mood and affect normal.      LAB RESULTS  Lab Results (last 24 hours)     Procedure Component Value Units " Date/Time    POC Glucose Once [[id number]]  (Abnormal) Collected: 03/08/22 1132    Specimen: Blood Updated: 03/08/22 1155     Glucose 197 mg/dL      Comment: Meter: [id number] : [id number] Frank Reyesin JAMES       Renal Function Panel [[id number]]  (Abnormal) Collected: 03/08/22 0622    Specimen: Blood Updated: 03/08/22 0740     Glucose 134 mg/dL      BUN 52 mg/dL      Creatinine 2.65 mg/dL      Sodium 140 mmol/L      Potassium 5.3 mmol/L      Chloride 108 mmol/L      CO2 23.1 mmol/L      Calcium 8.8 mg/dL      Albumin 2.90 g/dL      Phosphorus 3.9 mg/dL      Anion Gap 8.9 mmol/L      BUN/Creatinine Ratio 19.6     eGFR 25.8 mL/min/1.73      Comment: National Kidney Foundation and American Society of Nephrology (ASN) Task Force recommended calculation based on the Chronic Kidney Disease Epidemiology Collaboration (CKD-EPI) equation refit without adjustment for race.       Narrative:      GFR Normal >60  Chronic Kidney Disease <60  Kidney Failure <15      Uric Acid [[id number]]  (Normal) Collected: 03/08/22 0622    Specimen: Blood Updated: 03/08/22 0740     Uric Acid 5.1 mg/dL     Magnesium [[id number]]  (Normal) Collected: 03/08/22 0622    Specimen: Blood Updated: 03/08/22 0740     Magnesium 2.0 mg/dL     POC Glucose Once [[id number]]  (Normal) Collected: 03/08/22 0607    Specimen: Blood Updated: 03/08/22 0617     Glucose 123 mg/dL      Comment: Meter: [id number] : [id number] Mykel RAMIREZ       POC Glucose Once [[id number]]  (Abnormal) Collected: 03/07/22 2057    Specimen: Blood Updated: 03/07/22 2114     Glucose 156 mg/dL      Comment: Meter: [id number] : [id number] Mykel Tung NA       POC Glucose Once [[id number]]  (Abnormal) Collected: 03/07/22 1639    Specimen: Blood Updated: 03/07/22 1640     Glucose 209 mg/dL      Comment: Meter: [id number] : [id number] Lory RAMIREZ           Imaging Results (Last 24 Hours)     ** No results found for the last 24 hours. **             ECG 12 Lead  Component    Ref Range & Units 3/1/22 0256 2/3/22 0604 2/2/22 0342 2/1/22 1535 12/15/21 1248 5/20/21 2206   QT Interval   ms 482 P  488  482  491  511  469              HEART RATE= 61  bpm  RR Interval= 988  ms  HI Interval= 68  ms  P Horizontal Axis= 2  deg  P Front Axis= -55  deg  QRSD Interval= 172  ms  QT Interval= 482  ms  QRS Axis= -86  deg  T Wave Axis= 86  deg  - ABNORMAL ECG -  Sinus or ectopic atrial rhythm  Short HI interval  Right bundle branch block  Anteroseptal infarct, age indeterminate             Current Facility-Administered Medications:   •  apixaban (ELIQUIS) tablet 2.5 mg, 2.5 mg, Oral, Q12H, Josue Pickens MD, 2.5 mg at 03/08/22 0812  •  aspirin EC tablet 81 mg, 81 mg, Oral, Daily, Josue Pickens MD, 81 mg at 03/08/22 0812  •  atorvastatin (LIPITOR) tablet 10 mg, 10 mg, Oral, Nightly, Josue Pickens MD, 10 mg at 03/07/22 2031  •  buPROPion XL (WELLBUTRIN XL) 24 hr tablet 300 mg, 300 mg, Oral, Griselda MUIR Charles Brook III, MD, 300 mg at 03/08/22 0616  •  carvedilol (COREG) tablet 3.125 mg, 3.125 mg, Oral, BID With Meals, Josue Pickens MD, 3.125 mg at 03/08/22 0812  •  cholecalciferol (VITAMIN D3) tablet 1,000 Units, 1,000 Units, Oral, Daily, Josue Pickens MD, 1,000 Units at 03/08/22 0812  •  famotidine (PEPCID) tablet 20 mg, 20 mg, Oral, BID, Josue Pickens MD, 20 mg at 03/08/22 0812  •  insulin lispro (ADMELOG) injection 0-7 Units, 0-7 Units, Subcutaneous, 4x Daily With Meals & Nightly, Josue Pickens MD, 2 Units at 03/08/22 1217  •  levothyroxine (SYNTHROID, LEVOTHROID) tablet 75 mcg, 75 mcg, Oral, Daily, Josue Pickens MD, 75 mcg at 03/08/22 0812  •  polyethylene glycol (MIRALAX) packet 17 g, 17 g, Oral, Daily, Zahida Hsu MD, 17 g at 03/08/22 0812  •  [COMPLETED] Insert peripheral IV, , , Once **AND** sodium chloride 0.9 % flush 10 mL, 10 mL, Intravenous, PRN, Sergio Burgess MD  •  sodium chloride nasal spray 1 spray, 1 spray, Each Nare, PRN, Josue Pickens MD  •  tamsulosin (FLOMAX) 24  hr capsule 0.4 mg, 0.4 mg, Oral, Nightly, Ora Pickens MD, 0.4 mg at 03/07/22 2031  •  torsemide (DEMADEX) tablet 40 mg, 40 mg, Oral, Daily, SharifCameron MD, 40 mg at 03/08/22 0812  •  zolpidem (AMBIEN) tablet 5 mg, 5 mg, Oral, Nightly PRN, Ora Pickens MD, 5 mg at 03/07/22 2031    ASSESSMENT  Acute kidney injury resolved  Chronic kidney disease stage IV  Elevated troponin with no chest pain  Diabetes mellitus  Hypertension with low BP  Hyperlipidemia  Gastroesophageal reflux disease    PLAN  Discharge to subacute rehab  Discharge summary dictated    ORA PICKENS MD

## 2022-03-08 NOTE — DISCHARGE SUMMARY
Discharge summary    Date of admission 3/1/2022  Date of discharge 3/8/2022    Final diagnosis  Acute kidney injury resolved  Chronic kidney disease stage IV  Elevated troponin   Diabetes mellitus  Hypertension   Hyperlipidemia  Gastroesophageal reflux disease    Discharge medications    Current Facility-Administered Medications:   •  apixaban (ELIQUIS) tablet 2.5 mg, 2.5 mg, Oral, Q12H, Josue Pickens MD, 2.5 mg at 03/08/22 0812  •  aspirin EC tablet 81 mg, 81 mg, Oral, Daily, Josue Pickens MD, 81 mg at 03/08/22 0812  •  atorvastatin (LIPITOR) tablet 10 mg, 10 mg, Oral, Nightly, Josue Pickens MD, 10 mg at 03/07/22 2031  •  buPROPion XL (WELLBUTRIN XL) 24 hr tablet 300 mg, 300 mg, Oral, QAMGriselda Charles Brook III, MD, 300 mg at 03/08/22 0616  •  carvedilol (COREG) tablet 3.125 mg, 3.125 mg, Oral, BID With Meals, Josue Pickens MD, 3.125 mg at 03/08/22 0812  •  cholecalciferol (VITAMIN D3) tablet 1,000 Units, 1,000 Units, Oral, Daily, Josue Pickens MD, 1,000 Units at 03/08/22 0812  •  famotidine (PEPCID) tablet 20 mg, 20 mg, Oral, BID, Josue Pickens MD, 20 mg at 03/08/22 0812  •  insulin lispro (ADMELOG) injection 0-7 Units, 0-7 Units, Subcutaneous, 4x Daily With Meals & Nightly, Josue Pickens MD, 2 Units at 03/08/22 1217  •  levothyroxine (SYNTHROID, LEVOTHROID) tablet 75 mcg, 75 mcg, Oral, Daily, Josue Pickens MD, 75 mcg at 03/08/22 0812  •  polyethylene glycol (MIRALAX) packet 17 g, 17 g, Oral, Daily, Zahida Hsu MD, 17 g at 03/08/22 0812  •  [COMPLETED] Insert peripheral IV, , , Once **AND** sodium chloride 0.9 % flush 10 mL, 10 mL, Intravenous, PRN, Sergio Burgess MD  •  tamsulosin (FLOMAX) 24 hr capsule 0.4 mg, 0.4 mg, Oral, Nightly, Josue Pickens MD, 0.4 mg at 03/07/22 2031  •  torsemide (DEMADEX) tablet 40 mg, 40 mg, Oral, Daily, SharifCameron MD, 40 mg at 03/08/22 0812     Consults obtained  Nephrology  Cardiology  Psychiatry  Nutrition    Procedures  [facility]  course  66-year-old white male with multiple medical problems including chronic kidney disease stage IV admitted to emergency room with generalized weakness and fell at home.  Patient work-up revealed acute kidney injury on top of chronic kidney disease admit for management.  Patient also found to be orthostatic.  Patient diuretic was held and received fluids and followed by nephrology and cardiology.  Patient has very slow improvement and back to baseline with a BUN of 52 creatinine 2.56 and his Demadex restarted.  Patient blood pressure also stabilized.  Patient does not require hemodialysis at this time.  Patient is very weak and agreeable to go to subacute rehab for PT OT nursing care.  Patient also followed by psychiatry and adjusted as Wellbutrin dose.    Discharge diet regular    Activity per PT OT    Medication as above    Further care per accepting physician at subacute rehab and follow-up with nephrology cardiology and psychiatry per the instruction and take medication as directed    ORA MCDONALD MD

## 2022-03-08 NOTE — PROGRESS NOTES
[facility] Progress Note      Patient Name: Carlito Mo  : 1955  MRN: [id number]  Primary Care Physician:  Florencia Caballero MD  Date of admission: 3/1/2022    Subjective     Interval History:   Follow up SYLVAIN on CKD IV.     The patient is feeling much better, denies chest pain or shortness of air, no orthopnea or PND, no nausea or vomiting, no dysuria or gross hematuria.    Review of Systems:   As noted above    Objective     Vitals:   Temp:  [97.7 °F (36.5 °C)-98.4 °F (36.9 °C)] 98.4 °F (36.9 °C)  Heart Rate:  [69-77] 77  Resp:  [16-18] 18  BP: (154-176)/(81-91) 158/90    Intake/Output Summary (Last 24 hours) at 3/8/2022 0832  Last data filed at 3/8/2022 0616  Gross per 24 hour   Intake 580 ml   Output 400 ml   Net 180 ml       Physical Exam:    General Appearance: alert, oriented x 3, no acute distress . Chronically ill.   Skin: warm and dry  HEENT: oral mucosa normal, nonicteric sclera  Neck: supple, no JVD  Lungs: Clear to auscultation   Heart: RRR, normal S1 and S2. No rub .  Abdomen: soft, nontender, non-distended.  Normoactive bowels  Extremities: no edema.  Neuro: normal speech and mental status     Scheduled Meds:     apixaban, 2.5 mg, Oral, Q12H  aspirin, 81 mg, Oral, Daily  atorvastatin, 10 mg, Oral, Nightly  buPROPion XL, 300 mg, Oral, QAM  carvedilol, 3.125 mg, Oral, BID With Meals  cholecalciferol, 1,000 Units, Oral, Daily  famotidine, 20 mg, Oral, BID  insulin lispro, 0-7 Units, Subcutaneous, 4x Daily With Meals & Nightly  levothyroxine, 75 mcg, Oral, Daily  polyethylene glycol, 17 g, Oral, Daily  tamsulosin, 0.4 mg, Oral, Nightly  torsemide, 40 mg, Oral, Daily      IV Meds:        Results Reviewed:   I have personally reviewed the results from the time of this admission to 3/8/2022 08:32 EST     Results from last 7 days   Lab Units 22  0622 22  0714 22  0604 22  0425 22  0622   SODIUM mmol/L 140 141 139   < > 140   POTASSIUM mmol/L  5.3* 5.0 5.0   < > 3.9   CHLORIDE mmol/L 108* 111* 110*   < > 107   CO2 mmol/L 23.1 23.1 23.0   < > 20.6*   BUN mg/dL 52* 47* 51*   < > 68*   CREATININE mg/dL 2.65* 2.90* 2.71*   < > 3.88*   CALCIUM mg/dL 8.8 9.1 8.4*   < > 9.2   BILIRUBIN mg/dL  --   --   --   --  0.2   ALK PHOS U/L  --   --   --   --  89   ALT (SGPT) U/L  --   --   --   --  49*   AST (SGOT) U/L  --   --   --   --  22   GLUCOSE mg/dL 134* 132* 128*   < > 81    < > = values in this interval not displayed.       Estimated Creatinine Clearance: 30.3 mL/min (A) (by C-G formula based on SCr of 2.65 mg/dL (H)).    Results from last 7 days   Lab Units 03/08/22 0622 03/05/22 0527 03/04/22 0543 03/03/22 0425 03/02/22  0622   MAGNESIUM mg/dL 2.0 1.8  --   --  2.0   PHOSPHORUS mg/dL 3.9 3.6 3.8   < >  --     < > = values in this interval not displayed.       Results from last 7 days   Lab Units 03/08/22 0622 03/05/22 0527 03/02/22 0622   URIC ACID mg/dL 5.1 5.7 9.3*       Results from last 7 days   Lab Units 03/07/22  0714 03/06/22  0604 03/04/22 0543 03/03/22 0425 03/02/22 0622   WBC 10*3/mm3 6.91 6.68 6.21 5.47 6.68   HEMOGLOBIN g/dL 9.9* 9.5* 10.6* 10.4* 11.5*   PLATELETS 10*3/mm3 204 205 216 224 250             Assessment / Plan     ASSESSMENT:  1.  SYLVAIN on CKD IV> Baseline crt in 3's.  Volume depletion due to diuresis. Improved with IVF, creatinine today 2.65, electrolyte within acceptable range except potassium 5.3  2. Chronic combined systolic and diastolic heart failure. Compensated.  3. DM2  4. History of CVA  5. CAD sp CABG.   6.  Anemia of chronic kidney disease, hemoglobin today 9.9, will continue to monitor    PLAN:  1.  Give 1 dose of Lokelma, restrict sodium and potassium in the diet  2.  Continue the current dose of torsemide 40 mg  3.  Surveillance labs      Cameron Olguin MD  03/08/22  08:32 EST    [facility]  [phone]

## 2022-03-08 NOTE — CASE MANAGEMENT/SOCIAL WORK
Case Management Discharge Note      Final Note: [facility]. Transport by family. Notified wife of plan for D/C today. Dragan Delarosa RN-BC    Provided Post Acute Provider List?: N/A  N/A Provider List Comment: Referral requested for [facility].  Provided Post Acute Provider Quality & Resource List?: N/A  N/A Quality & Resource List Comment: Referral requested for [facility].    Selected Continued Care - Admitted Since 3/1/2022     Destination Coordination complete.    Service Provider Selected Services Address Phone Fax Patient Preferred    [facility]  Skilled Nursing [address] [address] [phone] --          Durable Medical Equipment    No services have been selected for the patient.              Dialysis/Infusion    No services have been selected for the patient.              Home Medical Care Coordination complete.    Service Provider Selected Services Address Phone Fax Patient Preferred    [facility] [address] [address] [phone] --          Therapy    No services have been selected for the patient.              Community Resources    No services have been selected for the patient.              Community & DME    No services have been selected for the patient.                  Transportation Services  Private: Car    Final Discharge Disposition Code: 03 - skilled nursing facility (SNF)

## 2022-03-08 NOTE — PROGRESS NOTES
Adult Nutrition  Assessment/PES    Patient Name:  Carlito Mo  YOB: 1955  MRN: [id number]  Admit Date:  3/1/2022    Assessment Date:  3/8/2022  Nutrition follow up.    Reason for Assessment     Row Name 03/08/22 1156          Reason for Assessment    Reason For Assessment follow-up protocol                Nutrition/Diet History     Row Name 03/08/22 1157          Nutrition/Diet History    Typical Intake (Food/Fluid/EN/PN) Patient reported he is tolerating diet, patient reported about 85% of each meal and drinking boost glucose control-about 2/day                Anthropometrics     Row Name 03/08/22 0505          Anthropometrics    Weight 78.2 kg (172 lb 4.8 oz)                Labs/Tests/Procedures/Meds     Row Name 03/08/22 1157          Labs/Procedures/Meds    Lab Results Reviewed reviewed, pertinent     Lab Results Comments K, Creat, BUN            Diagnostic Tests/Procedures    Diagnostic Test/Procedure Reviewed reviewed, pertinent            Medications    Pertinent Medications Reviewed reviewed, pertinent                  Estimated/Assessed Needs - Anthropometrics     Row Name 03/08/22 0505          Anthropometrics    Weight 78.2 kg (172 lb 4.8 oz)                Nutrition Prescription Ordered     Row Name 03/08/22 1157          Nutrition Prescription PO    Supplement Boost Glucose Control (Glucerna Shake)     Supplement Frequency 3 times a day     Common Modifiers Cardiac;Consistent Carbohydrate;Low Potassium;Low Sodium                Evaluation of Received Nutrient/Fluid Intake     Row Name 03/08/22 1154          PO Evaluation    % PO Intake 85                     Problem/Interventions:           Intervention Goal     Row Name 03/08/22 4998          Intervention Goal    General Maintain nutrition;Reduce/improve symptoms;Meet nutritional needs for age/condition     PO Tolerate PO;Maintain intake     Weight Maintain weight                Nutrition Intervention     Row Name 03/08/22 1159           Nutrition Intervention    RD/Tech Action Care plan reviewd;Follow Tx progress;Interview for preference;Encourage intake                  Education/Evaluation     Row Name 03/08/22 9160          Education    Education Will Instruct as appropriate            Monitor/Evaluation    Monitor Per protocol;I&O;PO intake;Supplement intake;Pertinent labs;Weight;Skin status;Symptoms                 Electronically signed by:  Xiomy Livingston RD  03/08/22 11:58 EST

## 2022-04-11 ENCOUNTER — TRANSCRIBE ORDERS (OUTPATIENT)
Dept: ADMINISTRATIVE | Facility: HOSPITAL | Age: 67
End: 2022-04-11

## 2022-04-11 DIAGNOSIS — N18.6 ESRD (END STAGE RENAL DISEASE): Primary | ICD-10-CM

## 2022-04-14 ENCOUNTER — HOSPITAL ENCOUNTER (OUTPATIENT)
Dept: CARDIOLOGY | Facility: HOSPITAL | Age: 67
Discharge: HOME | End: 2022-04-14
Admitting: SURGERY

## 2022-04-14 DIAGNOSIS — N18.6 ESRD (END STAGE RENAL DISEASE): ICD-10-CM

## 2022-04-14 LAB
BH CV UPPER VENOUS LEFT BASILIC FOREARM COMPRESS: NORMAL
BH CV UPPER VENOUS LEFT BASILIC UPPER COMPRESS: NORMAL
BH CV UPPER VENOUS LEFT CEPHALIC FOREARM COMPRESS: NORMAL
BH CV UPPER VENOUS LEFT CEPHALIC UPPER COMPRESS: NORMAL
BH CV UPPER VENOUS LEFT INTERNAL JUGULAR AUGMENT: NORMAL
BH CV UPPER VENOUS LEFT INTERNAL JUGULAR COMPETENT: NORMAL
BH CV UPPER VENOUS LEFT INTERNAL JUGULAR COMPRESS: NORMAL
BH CV UPPER VENOUS LEFT INTERNAL JUGULAR PHASIC: NORMAL
BH CV UPPER VENOUS LEFT INTERNAL JUGULAR SPONT: NORMAL
BH CV UPPER VENOUS LEFT SUBCLAVIAN AUGMENT: NORMAL
BH CV UPPER VENOUS LEFT SUBCLAVIAN COMPETENT: NORMAL
BH CV UPPER VENOUS LEFT SUBCLAVIAN COMPRESS: NORMAL
BH CV UPPER VENOUS LEFT SUBCLAVIAN PHASIC: NORMAL
BH CV UPPER VENOUS LEFT SUBCLAVIAN SPONT: NORMAL
BH CV UPPER VENOUS RIGHT BASILIC FOREARM COLOR: 1
BH CV UPPER VENOUS RIGHT BASILIC FOREARM COMPRESS: NORMAL
BH CV UPPER VENOUS RIGHT BASILIC FOREARM THROMBUS: NORMAL
BH CV UPPER VENOUS RIGHT BASILIC UPPER COMPRESS: NORMAL
BH CV UPPER VENOUS RIGHT CEPHALIC FOREARM COMPRESS: NORMAL
BH CV UPPER VENOUS RIGHT CEPHALIC UPPER COMPRESS: NORMAL
BH CV UPPER VENOUS RIGHT INTERNAL JUGULAR AUGMENT: NORMAL
BH CV UPPER VENOUS RIGHT INTERNAL JUGULAR COMPETENT: NORMAL
BH CV UPPER VENOUS RIGHT INTERNAL JUGULAR COMPRESS: NORMAL
BH CV UPPER VENOUS RIGHT INTERNAL JUGULAR PHASIC: NORMAL
BH CV UPPER VENOUS RIGHT INTERNAL JUGULAR SPONT: NORMAL
BH CV UPPER VENOUS RIGHT SUBCLAVIAN AUGMENT: NORMAL
BH CV UPPER VENOUS RIGHT SUBCLAVIAN COMPETENT: NORMAL
BH CV UPPER VENOUS RIGHT SUBCLAVIAN COMPRESS: NORMAL
BH CV UPPER VENOUS RIGHT SUBCLAVIAN PHASIC: NORMAL
BH CV UPPER VENOUS RIGHT SUBCLAVIAN SPONT: NORMAL
BH CV VAS MEAS BASILIC ANTECUBITAL FOSSA LEFT: 0.17 CM
BH CV VAS MEAS BASILIC ANTECUBITAL FOSSA RIGHT: 0.16 CM
BH CV VAS MEAS BASILIC FOREARM LEFT - DIST: 0.06 CM
BH CV VAS MEAS BASILIC FOREARM LEFT - MID: 0.09 CM
BH CV VAS MEAS BASILIC FOREARM LEFT - PROX: 0.09 CM
BH CV VAS MEAS BASILIC FOREARM RIGHT - DIST: 0.09 CM
BH CV VAS MEAS BASILIC FOREARM RIGHT - MID: 0.12 CM
BH CV VAS MEAS BASILIC FOREARM RIGHT - PROX: 0.11 CM
BH CV VAS MEAS BASILIC UPPER ARM LEFT - DIST: 0.33 CM
BH CV VAS MEAS BASILIC UPPER ARM LEFT - MID: 0.4 CM
BH CV VAS MEAS BASILIC UPPER ARM LEFT - PROX: 0.48 CM
BH CV VAS MEAS BASILIC UPPER ARM RIGHT - DIST: 0.3 CM
BH CV VAS MEAS BASILIC UPPER ARM RIGHT - MID: 0.72 CM
BH CV VAS MEAS BASILIC UPPER ARM RIGHT - PROX: 0.64 CM
BH CV VAS MEAS CEPHALIC ANTECUBITAL FOSSA LEFT: 0.2 CM
BH CV VAS MEAS CEPHALIC ANTECUBITAL FOSSA RIGHT: 0.16 CM
BH CV VAS MEAS CEPHALIC FOREARM LEFT - DIST: 0.11 CM
BH CV VAS MEAS CEPHALIC FOREARM LEFT - MID: 0.09 CM
BH CV VAS MEAS CEPHALIC FOREARM LEFT - PROX: 0.16 CM
BH CV VAS MEAS CEPHALIC FOREARM RIGHT - DIST: 0.12 CM
BH CV VAS MEAS CEPHALIC FOREARM RIGHT - MID: 0.23 CM
BH CV VAS MEAS CEPHALIC FOREARM RIGHT - PROX: 0.2 CM
BH CV VAS MEAS CEPHALIC UPPER ARM LEFT - DIST: 0.2 CM
BH CV VAS MEAS CEPHALIC UPPER ARM LEFT - MID: 0.16 CM
BH CV VAS MEAS CEPHALIC UPPER ARM LEFT - PROX: 0.18 CM
BH CV VAS MEAS CEPHALIC UPPER ARM RIGHT - DIST: 0.14 CM
BH CV VAS MEAS CEPHALIC UPPER ARM RIGHT - MID: 0.12 CM
BH CV VAS MEAS CEPHALIC UPPER ARM RIGHT - PROX: 0.22 CM
BH CV VAS MEAS RADIAL UPPER ARM LEFT - DIST: 0.28 CM
BH CV VAS MEAS RADIAL UPPER ARM LEFT - MID: 0.21 CM
BH CV VAS MEAS RADIAL UPPER ARM LEFT - PROX: 0.18 CM
BH CV VAS MEAS RADIAL UPPER ARM RIGHT - DIST: 0.22 CM
BH CV VAS MEAS RADIAL UPPER ARM RIGHT - MID: 0.21 CM
BH CV VAS MEAS RADIAL UPPER ARM RIGHT - PROX: 0.17 CM
MAXIMAL PREDICTED HEART RATE: 154 BPM
STRESS TARGET HR: 131 BPM
UPPER ARTERIAL LEFT ARM BRACHIAL LENGTH: 0.5 CM
UPPER ARTERIAL RIGHT ARM BRACHIAL LENGTH: 0.43 CM

## 2022-04-14 PROCEDURE — 93986 DUP-SCAN HEMO COMPL UNI STD: CPT

## 2022-04-14 PROCEDURE — 93985 DUP-SCAN HEMO COMPL BI STD: CPT

## 2022-05-18 ENCOUNTER — PRE-ADMISSION TESTING (OUTPATIENT)
Dept: PREADMISSION TESTING | Facility: HOSPITAL | Age: 67
End: 2022-05-18

## 2022-05-18 VITALS
OXYGEN SATURATION: 97 % | TEMPERATURE: 98 F | HEIGHT: 72 IN | BODY MASS INDEX: 23.62 KG/M2 | DIASTOLIC BLOOD PRESSURE: 64 MMHG | RESPIRATION RATE: 18 BRPM | WEIGHT: 174.4 LBS | SYSTOLIC BLOOD PRESSURE: 129 MMHG | HEART RATE: 60 BPM

## 2022-05-18 LAB
ANION GAP SERPL CALCULATED.3IONS-SCNC: 9.7 MMOL/L (ref 5–15)
BUN SERPL-MCNC: 53 MG/DL (ref 8–23)
BUN/CREAT SERPL: 16.1 (ref 7–25)
CALCIUM SPEC-SCNC: 9.1 MG/DL (ref 8.6–10.5)
CHLORIDE SERPL-SCNC: 105 MMOL/L (ref 98–107)
CO2 SERPL-SCNC: 24.3 MMOL/L (ref 22–29)
CREAT SERPL-MCNC: 3.29 MG/DL (ref 0.76–1.27)
DEPRECATED RDW RBC AUTO: 40.5 FL (ref 37–54)
EGFRCR SERPLBLD CKD-EPI 2021: 19.9 ML/MIN/1.73
ERYTHROCYTE [DISTWIDTH] IN BLOOD BY AUTOMATED COUNT: 13.2 % (ref 12.3–15.4)
GLUCOSE SERPL-MCNC: 231 MG/DL (ref 65–99)
HCT VFR BLD AUTO: 26.1 % (ref 37.5–51)
HGB BLD-MCNC: 8.7 G/DL (ref 13–17.7)
MCH RBC QN AUTO: 28.5 PG (ref 26.6–33)
MCHC RBC AUTO-ENTMCNC: 33.3 G/DL (ref 31.5–35.7)
MCV RBC AUTO: 85.6 FL (ref 79–97)
PLATELET # BLD AUTO: 236 10*3/MM3 (ref 140–450)
PMV BLD AUTO: 9.6 FL (ref 6–12)
POTASSIUM SERPL-SCNC: 3.9 MMOL/L (ref 3.5–5.2)
RBC # BLD AUTO: 3.05 10*6/MM3 (ref 4.14–5.8)
SARS-COV-2 ORF1AB RESP QL NAA+PROBE: NOT DETECTED
SODIUM SERPL-SCNC: 139 MMOL/L (ref 136–145)
WBC NRBC COR # BLD: 5.61 10*3/MM3 (ref 3.4–10.8)

## 2022-05-18 PROCEDURE — 85027 COMPLETE CBC AUTOMATED: CPT

## 2022-05-18 PROCEDURE — 36415 COLL VENOUS BLD VENIPUNCTURE: CPT

## 2022-05-18 PROCEDURE — C9803 HOPD COVID-19 SPEC COLLECT: HCPCS

## 2022-05-18 PROCEDURE — U0004 COV-19 TEST NON-CDC HGH THRU: HCPCS

## 2022-05-18 PROCEDURE — 80048 BASIC METABOLIC PNL TOTAL CA: CPT

## 2022-05-18 PROCEDURE — U0005 INFEC AGEN DETEC AMPLI PROBE: HCPCS

## 2022-05-18 NOTE — DISCHARGE INSTRUCTIONS
Arrive to hospital on your day of surgery at  530AM    Take the following medications the morning of surgery: WELLBUTRIN, CARVEDILOL, LEVOTHYROXINE    BRING MEDIACATION LIST AND DOSAGES DAY OF SURGERY      If you are on prescription narcotic pain medication to control your pain you may also take that medication the morning of surgery.    General Instructions:  Do not eat solid food after midnight the night before surgery.  You may drink clear liquids day of surgery but must stop at least one hour before your hospital arrival time.  It is beneficial for you to have a clear drink that contains carbohydrates the day of surgery.  We suggest a 12 to 20 ounce bottle of Gatorade or Powerade for non-diabetic patients or a 12 to 20 ounce bottle of G2 or Powerade Zero for diabetic patients. (Pediatric patients, are not advised to drink a 12 to 20 ounce carbohydrate drink)    Clear liquids are liquids you can see through.  Nothing red in color.     Plain water                               Sports drinks  Sodas                                   Gelatin (Jell-O)  Fruit juices without pulp such as white grape juice and apple juice  Popsicles that contain no fruit or yogurt  Tea or coffee (no cream or milk added)  Gatorade / Powerade  G2 / Powerade Zero    Infants may have breast milk up to four hours before surgery.  Infants drinking formula may drink formula up to six hours before surgery.   Patients who avoid smoking, chewing tobacco and alcohol for 4 weeks prior to surgery have a reduced risk of post-operative complications.  Quit smoking as many days before surgery as you can.  Do not smoke, use chewing tobacco or drink alcohol the day of surgery.   If applicable bring your C-PAP/ BI-PAP machine.  Bring any papers given to you in the doctor’s office.  Wear clean comfortable clothes.  Do not wear contact lenses, false eyelashes or make-up.  Bring a case for your glasses.   Bring crutches or walker if applicable.  Remove all  piercings.  Leave jewelry and any other valuables at home.  Hair extensions with metal clips must be removed prior to surgery.  The Pre-Admission Testing nurse will instruct you to bring medications if unable to obtain an accurate list in Pre-Admission Testing.        If you were given a blood bank ID arm band remember to bring it with you the day of surgery.    Preventing a Surgical Site Infection:  For 2 to 3 days before surgery, avoid shaving with a razor because the razor can irritate skin and make it easier to develop an infection.    Any areas of open skin can increase the risk of a post-operative wound infection by allowing bacteria to enter and travel throughout the body.  Notify your surgeon if you have any skin wounds / rashes even if it is not near the expected surgical site.  The area will need assessed to determine if surgery should be delayed until it is healed.  The night prior to surgery shower using a fresh bar of anti-bacterial soap (such as Dial) and clean washcloth.  Sleep in a clean bed with clean clothing.  Do not allow pets to sleep with you.  Shower on the morning of surgery using a fresh bar of anti-bacterial soap (such as Dial) and clean washcloth.  Dry with a clean towel and dress in clean clothing.  Ask your surgeon if you will be receiving antibiotics prior to surgery.  Make sure you, your family, and all healthcare providers clean their hands with soap and water or an alcohol based hand  before caring for you or your wound.    Day of surgery:  Your arrival time is approximately two hours before your scheduled surgery time.  Upon arrival, a Pre-op nurse and Anesthesiologist will review your health history, obtain vital signs, and answer questions you may have.  The only belongings needed at this time will be a list of your home medications and if applicable your C-PAP/BI-PAP machine.  A Pre-op nurse will start an IV and you may receive medication in preparation for surgery,  including something to help you relax.     Please be aware that surgery does come with discomfort.  We want to make every effort to control your discomfort so please discuss any uncontrolled symptoms with your nurse.   Your doctor will most likely have prescribed pain medications.      If you are going home after surgery you will receive individualized written care instructions before being discharged.  A responsible adult must drive you to and from the hospital on the day of your surgery and stay with you for 24 hours.  Discharge prescriptions can be filled by the hospital pharmacy during regular pharmacy hours.  If you are having surgery late in the day/evening your prescription may be e-prescribed to your pharmacy.  Please verify your pharmacy hours or chose a 24 hour pharmacy to avoid not having access to your prescription because your pharmacy has closed for the day.    If you are staying overnight following surgery, you will be transported to your hospital room following the recovery period.  [facility] has all private rooms.    If you have any questions please call Pre-Admission Testing at [phone].  Deductibles and co-payments are collected on the day of service. Please be prepared to pay the required co-pay, deductible or deposit on the day of service as defined by your plan.    Patient Education for Self-Quarantine Process    Following your COVID testing, we strongly recommend that you wear a mask when you are with other people and practice social distancing.   Limit your activities to only required outings.  Wash your hands with soap and water frequently for at least 20 seconds.   Avoid touching your eyes, nose and mouth with unwashed hands.  Do not share anything - utensils, drinking glasses, food from the same bowl.   Sanitize household surfaces daily. Include all high touch areas (door handles, light switches, phones, countertops, etc.)    Call your surgeon immediately if you  experience any of the following symptoms:  Sore Throat  Shortness of Breath or difficulty breathing  Cough  Chills  Body soreness or muscle pain  Headache  Fever  New loss of taste or smell  Do not arrive for your surgery ill.  Your procedure will need to be rescheduled to another time.  You will need to call your physician before the day of surgery to avoid any unnecessary exposure to hospital staff as well as other patients.     CHLORHEXIDINE CLOTH INSTRUCTIONS  The morning of surgery follow these instructions using the Chlorhexidine cloths you've been given.  These steps reduce bacteria on the body.  Do not use the cloths near your eyes, ears mouth, genitalia or on open wounds.  Throw the cloths away after use but do not try to flush them down a toilet.      Open and remove one cloth at a time from the package.    Leave the cloth unfolded and begin the bathing.  Massage the skin with the cloths using gentle pressure to remove bacteria.  Do not scrub harshly.   Follow the steps below with one 2% CHG cloth per area (6 total cloths).  One cloth for neck, shoulders and chest.  One cloth for both arms, hands, fingers and underarms (do underarms last).  One cloth for the abdomen followed by groin.  One cloth for right leg and foot including between the toes.  One cloth for left leg and foot including between the toes.  The last cloth is to be used for the back of the neck, back and buttocks.    Allow the CHG to air dry 3 minutes on the skin which will give it time to work and decrease the chance of irritation.  The skin may feel sticky until it is dry.  Do not rinse with water or any other liquid or you will lose the beneficial effects of the CHG.  If mild skin irritation occurs, do rinse the skin to remove the CHG.  Report this to the nurse at time of admission.  Do not apply lotions, creams, ointments, deodorants or perfumes after using the clothes. Dress in clean clothes before coming to the hospital.

## 2022-05-20 ENCOUNTER — HOSPITAL ENCOUNTER (OUTPATIENT)
Facility: HOSPITAL | Age: 67
Setting detail: HOSPITAL OUTPATIENT SURGERY
Discharge: HOME | End: 2022-05-20
Attending: SURGERY | Admitting: SURGERY

## 2022-05-20 ENCOUNTER — ANESTHESIA EVENT (OUTPATIENT)
Dept: PERIOP | Facility: HOSPITAL | Age: 67
End: 2022-05-20

## 2022-05-20 ENCOUNTER — ANESTHESIA (OUTPATIENT)
Dept: PERIOP | Facility: HOSPITAL | Age: 67
End: 2022-05-20

## 2022-05-20 VITALS
SYSTOLIC BLOOD PRESSURE: 157 MMHG | BODY MASS INDEX: 23.8 KG/M2 | TEMPERATURE: 98 F | DIASTOLIC BLOOD PRESSURE: 67 MMHG | RESPIRATION RATE: 16 BRPM | HEART RATE: 60 BPM | WEIGHT: 175.71 LBS | OXYGEN SATURATION: 99 % | HEIGHT: 72 IN

## 2022-05-20 LAB — GLUCOSE BLDC GLUCOMTR-MCNC: 174 MG/DL (ref 70–130)

## 2022-05-20 PROCEDURE — 82962 GLUCOSE BLOOD TEST: CPT

## 2022-05-20 PROCEDURE — G0463 HOSPITAL OUTPT CLINIC VISIT: HCPCS | Performed by: SURGERY

## 2022-05-20 RX ORDER — SODIUM CHLORIDE, SODIUM LACTATE, POTASSIUM CHLORIDE, CALCIUM CHLORIDE 600; 310; 30; 20 MG/100ML; MG/100ML; MG/100ML; MG/100ML
9 INJECTION, SOLUTION INTRAVENOUS CONTINUOUS
Status: DISCONTINUED | OUTPATIENT
Start: 2022-05-20 | End: 2022-05-20 | Stop reason: HOSPADM

## 2022-05-20 RX ORDER — FAMOTIDINE 10 MG/ML
20 INJECTION, SOLUTION INTRAVENOUS ONCE
Status: COMPLETED | OUTPATIENT
Start: 2022-05-20 | End: 2022-05-20

## 2022-05-20 RX ORDER — MIDAZOLAM HYDROCHLORIDE 1 MG/ML
0.5 INJECTION INTRAMUSCULAR; INTRAVENOUS
Status: DISCONTINUED | OUTPATIENT
Start: 2022-05-20 | End: 2022-05-20 | Stop reason: HOSPADM

## 2022-05-20 RX ORDER — FENTANYL CITRATE 50 UG/ML
50 INJECTION, SOLUTION INTRAMUSCULAR; INTRAVENOUS
Status: DISCONTINUED | OUTPATIENT
Start: 2022-05-20 | End: 2022-05-20 | Stop reason: HOSPADM

## 2022-05-20 RX ORDER — CEFAZOLIN SODIUM 2 G/100ML
2 INJECTION, SOLUTION INTRAVENOUS ONCE
Status: DISCONTINUED | OUTPATIENT
Start: 2022-05-20 | End: 2022-05-20 | Stop reason: HOSPADM

## 2022-05-20 RX ORDER — SODIUM CHLORIDE 0.9 % (FLUSH) 0.9 %
3 SYRINGE (ML) INJECTION EVERY 12 HOURS SCHEDULED
Status: DISCONTINUED | OUTPATIENT
Start: 2022-05-20 | End: 2022-05-20 | Stop reason: HOSPADM

## 2022-05-20 RX ORDER — SODIUM CHLORIDE 0.9 % (FLUSH) 0.9 %
3-10 SYRINGE (ML) INJECTION AS NEEDED
Status: DISCONTINUED | OUTPATIENT
Start: 2022-05-20 | End: 2022-05-20 | Stop reason: HOSPADM

## 2022-05-20 RX ORDER — LIDOCAINE HYDROCHLORIDE 10 MG/ML
0.5 INJECTION, SOLUTION EPIDURAL; INFILTRATION; INTRACAUDAL; PERINEURAL ONCE AS NEEDED
Status: DISCONTINUED | OUTPATIENT
Start: 2022-05-20 | End: 2022-05-20 | Stop reason: HOSPADM

## 2022-05-20 RX ORDER — SODIUM CHLORIDE 9 MG/ML
9 INJECTION, SOLUTION INTRAVENOUS CONTINUOUS
Status: DISCONTINUED | OUTPATIENT
Start: 2022-05-20 | End: 2022-05-20 | Stop reason: HOSPADM

## 2022-05-20 RX ADMIN — FAMOTIDINE 20 MG: 10 INJECTION INTRAVENOUS at 07:15

## 2022-05-20 NOTE — ANESTHESIA PREPROCEDURE EVALUATION
Anesthesia Evaluation     Patient summary reviewed and Nursing notes reviewed   NPO Solid Status: > 8 hours  NPO Liquid Status: > 2 hours           Airway   Mallampati: II  TM distance: >3 FB  Neck ROM: full  Dental - normal exam     Pulmonary - normal exam   (+) pneumonia resolved , pleural effusion, COPD, sleep apnea,   Cardiovascular - normal exam    ECG reviewed    (+) hypertension, past MI , CAD, CABG, dysrhythmias, CHF , hyperlipidemia,     ROS comment: · There is severe hypokinesis of the mid to distal anteroseptum  · Left ventricular ejection fraction appears to be 46 - 50%.        Neuro/Psych- negative ROS  GI/Hepatic/Renal/Endo    (+)  GERD,  renal disease CRI, diabetes mellitus type 2 poorly controlled, thyroid problem hypothyroidism    Musculoskeletal (-) negative ROS    Abdominal    Substance History - negative use     OB/GYN negative ob/gyn ROS         Other                        Anesthesia Plan    ASA 4     MAC       Anesthetic plan, all risks, benefits, and alternatives have been provided, discussed and informed consent has been obtained with: patient.        CODE STATUS:

## 2022-05-20 NOTE — PERIOPERATIVE NURSING NOTE
Patient spoke with  regarding rescheduling procedure. Instructed patient and wife to call office if they have not heard from a  in 48hours. Discharged home.

## 2022-05-20 NOTE — PERIOPERATIVE NURSING NOTE
Dr. Willis notified pt took all of his medications yesterday, to include eliquis and aspirin.  Patient is poor historian but states that he keeps all of his medications in a pill dispenser and he took everything in the dispenser yesterday. Per  pt must be off eliquis 48 hours prior to procedure.

## 2022-05-20 NOTE — INTERVAL H&P NOTE
H&P updated. The patient was examined and the following changes are noted:        The patient unfortunately did not stop his Eliquis.  He is on Eliquis 2.5 mg p.o. twice daily for atrial fibrillation.  He was informed at the time of scheduling to hold the Eliquis for 48 hours.  He was seen in preadmission testing and at that time it is unclear if he was told to continue it or not.  Patient states that he took his Eliquis last night.  I believe this is an absolute contraindication to elective surgery and the risk for him would be catastrophic if he were to bleed.  We will therefore delay his surgery and reschedule.  At this point in time I have asked him to stop his Eliquis for 72 hours preop in order to make sure that this does not happen again.  In the interim it appears his cephalic vein on his forearm looks very healthy.  This is despite a negative vein mapping.  At the time of procedure we may consider a right radiocephalic rather than the mid arm fistula.  We will cancel and reschedule

## 2022-05-26 ENCOUNTER — TRANSCRIBE ORDERS (OUTPATIENT)
Dept: ADMINISTRATIVE | Facility: HOSPITAL | Age: 67
End: 2022-05-26

## 2022-05-26 DIAGNOSIS — Z01.818 PRE-TRANSPLANT EVALUATION FOR CHRONIC KIDNEY DISEASE: Primary | ICD-10-CM

## 2022-05-31 ENCOUNTER — TRANSCRIBE ORDERS (OUTPATIENT)
Dept: ADMINISTRATIVE | Facility: HOSPITAL | Age: 67
End: 2022-05-31

## 2022-05-31 DIAGNOSIS — Z01.818 OTHER SPECIFIED PRE-OPERATIVE EXAMINATION: Primary | ICD-10-CM

## 2022-06-01 ENCOUNTER — LAB (OUTPATIENT)
Dept: LAB | Facility: HOSPITAL | Age: 67
End: 2022-06-01

## 2022-06-01 ENCOUNTER — APPOINTMENT (OUTPATIENT)
Dept: GENERAL RADIOLOGY | Facility: HOSPITAL | Age: 67
End: 2022-06-01

## 2022-06-01 ENCOUNTER — APPOINTMENT (OUTPATIENT)
Dept: LAB | Facility: HOSPITAL | Age: 67
End: 2022-06-01

## 2022-06-01 ENCOUNTER — HOSPITAL ENCOUNTER (EMERGENCY)
Facility: HOSPITAL | Age: 67
Discharge: HOME | End: 2022-06-01
Attending: EMERGENCY MEDICINE | Admitting: EMERGENCY MEDICINE

## 2022-06-01 ENCOUNTER — TRANSCRIBE ORDERS (OUTPATIENT)
Dept: ADMINISTRATIVE | Facility: HOSPITAL | Age: 67
End: 2022-06-01

## 2022-06-01 VITALS
TEMPERATURE: 98.4 F | OXYGEN SATURATION: 98 % | SYSTOLIC BLOOD PRESSURE: 173 MMHG | BODY MASS INDEX: 24.38 KG/M2 | HEIGHT: 72 IN | RESPIRATION RATE: 16 BRPM | WEIGHT: 180 LBS | HEART RATE: 60 BPM | DIASTOLIC BLOOD PRESSURE: 85 MMHG

## 2022-06-01 DIAGNOSIS — Z01.810 PRE-OPERATIVE CARDIOVASCULAR EXAMINATION: Primary | ICD-10-CM

## 2022-06-01 DIAGNOSIS — N18.30 ANEMIA, CHRONIC RENAL FAILURE, STAGE 3 (MODERATE): ICD-10-CM

## 2022-06-01 DIAGNOSIS — D63.1 ANEMIA, CHRONIC RENAL FAILURE, STAGE 3 (MODERATE): ICD-10-CM

## 2022-06-01 DIAGNOSIS — N18.4 CKD (CHRONIC KIDNEY DISEASE) STAGE 4, GFR 15-29 ML/MIN: ICD-10-CM

## 2022-06-01 DIAGNOSIS — I10 UNCONTROLLED HYPERTENSION: Primary | ICD-10-CM

## 2022-06-01 DIAGNOSIS — R53.82 CHRONIC FATIGUE: ICD-10-CM

## 2022-06-01 LAB
ALBUMIN SERPL-MCNC: 3.9 G/DL (ref 3.5–5.2)
ALBUMIN/GLOB SERPL: 1.7 G/DL
ALP SERPL-CCNC: 87 U/L (ref 39–117)
ALT SERPL W P-5'-P-CCNC: 15 U/L (ref 1–41)
ANION GAP SERPL CALCULATED.3IONS-SCNC: 8.8 MMOL/L (ref 5–15)
AST SERPL-CCNC: 12 U/L (ref 1–40)
BACTERIA UR QL AUTO: NORMAL /HPF
BASOPHILS # BLD AUTO: 0.01 10*3/MM3 (ref 0–0.2)
BASOPHILS NFR BLD AUTO: 0.2 % (ref 0–1.5)
BILIRUB SERPL-MCNC: 0.2 MG/DL (ref 0–1.2)
BILIRUB UR QL STRIP: NEGATIVE
BUN SERPL-MCNC: 36 MG/DL (ref 8–23)
BUN/CREAT SERPL: 13 (ref 7–25)
CALCIUM SPEC-SCNC: 8.8 MG/DL (ref 8.6–10.5)
CHLORIDE SERPL-SCNC: 108 MMOL/L (ref 98–107)
CLARITY UR: CLEAR
CO2 SERPL-SCNC: 21.2 MMOL/L (ref 22–29)
COLOR UR: YELLOW
CREAT SERPL-MCNC: 2.77 MG/DL (ref 0.76–1.27)
DEPRECATED RDW RBC AUTO: 39.3 FL (ref 37–54)
EGFRCR SERPLBLD CKD-EPI 2021: 24.3 ML/MIN/1.73
EOSINOPHIL # BLD AUTO: 0.19 10*3/MM3 (ref 0–0.4)
EOSINOPHIL NFR BLD AUTO: 2.9 % (ref 0.3–6.2)
ERYTHROCYTE [DISTWIDTH] IN BLOOD BY AUTOMATED COUNT: 12.9 % (ref 12.3–15.4)
GLOBULIN UR ELPH-MCNC: 2.3 GM/DL
GLUCOSE SERPL-MCNC: 175 MG/DL (ref 65–99)
GLUCOSE UR STRIP-MCNC: ABNORMAL MG/DL
HCT VFR BLD AUTO: 28.2 % (ref 37.5–51)
HGB BLD-MCNC: 9.1 G/DL (ref 13–17.7)
HGB UR QL STRIP.AUTO: ABNORMAL
HYALINE CASTS UR QL AUTO: NORMAL /LPF
IMM GRANULOCYTES # BLD AUTO: 0.03 10*3/MM3 (ref 0–0.05)
IMM GRANULOCYTES NFR BLD AUTO: 0.5 % (ref 0–0.5)
INR PPP: 1.13 (ref 0.9–1.1)
KETONES UR QL STRIP: NEGATIVE
LEUKOCYTE ESTERASE UR QL STRIP.AUTO: NEGATIVE
LYMPHOCYTES # BLD AUTO: 1.13 10*3/MM3 (ref 0.7–3.1)
LYMPHOCYTES NFR BLD AUTO: 17.5 % (ref 19.6–45.3)
MCH RBC QN AUTO: 27.7 PG (ref 26.6–33)
MCHC RBC AUTO-ENTMCNC: 32.3 G/DL (ref 31.5–35.7)
MCV RBC AUTO: 85.7 FL (ref 79–97)
MONOCYTES # BLD AUTO: 0.59 10*3/MM3 (ref 0.1–0.9)
MONOCYTES NFR BLD AUTO: 9.1 % (ref 5–12)
NEUTROPHILS NFR BLD AUTO: 4.5 10*3/MM3 (ref 1.7–7)
NEUTROPHILS NFR BLD AUTO: 69.8 % (ref 42.7–76)
NITRITE UR QL STRIP: NEGATIVE
NRBC BLD AUTO-RTO: 0 /100 WBC (ref 0–0.2)
PH UR STRIP.AUTO: 5.5 [PH] (ref 5–8)
PLATELET # BLD AUTO: 257 10*3/MM3 (ref 140–450)
PMV BLD AUTO: 9.6 FL (ref 6–12)
POTASSIUM SERPL-SCNC: 4.6 MMOL/L (ref 3.5–5.2)
PROT SERPL-MCNC: 6.2 G/DL (ref 6–8.5)
PROT UR QL STRIP: ABNORMAL
PROTHROMBIN TIME: 14.4 SECONDS (ref 11.7–14.2)
RBC # BLD AUTO: 3.29 10*6/MM3 (ref 4.14–5.8)
RBC # UR STRIP: NORMAL /HPF
REF LAB TEST METHOD: NORMAL
SARS-COV-2 ORF1AB RESP QL NAA+PROBE: NOT DETECTED
SODIUM SERPL-SCNC: 138 MMOL/L (ref 136–145)
SP GR UR STRIP: 1.02 (ref 1–1.03)
SQUAMOUS #/AREA URNS HPF: NORMAL /HPF
UROBILINOGEN UR QL STRIP: ABNORMAL
WBC # UR STRIP: NORMAL /HPF
WBC NRBC COR # BLD: 6.45 10*3/MM3 (ref 3.4–10.8)

## 2022-06-01 PROCEDURE — 99284 EMERGENCY DEPT VISIT MOD MDM: CPT

## 2022-06-01 PROCEDURE — 85610 PROTHROMBIN TIME: CPT | Performed by: EMERGENCY MEDICINE

## 2022-06-01 PROCEDURE — 96374 THER/PROPH/DIAG INJ IV PUSH: CPT

## 2022-06-01 PROCEDURE — 36415 COLL VENOUS BLD VENIPUNCTURE: CPT

## 2022-06-01 PROCEDURE — 93005 ELECTROCARDIOGRAM TRACING: CPT | Performed by: EMERGENCY MEDICINE

## 2022-06-01 PROCEDURE — U0005 INFEC AGEN DETEC AMPLI PROBE: HCPCS | Performed by: SURGERY

## 2022-06-01 PROCEDURE — 80053 COMPREHEN METABOLIC PANEL: CPT | Performed by: EMERGENCY MEDICINE

## 2022-06-01 PROCEDURE — 93010 ELECTROCARDIOGRAM REPORT: CPT | Performed by: INTERNAL MEDICINE

## 2022-06-01 PROCEDURE — U0004 COV-19 TEST NON-CDC HGH THRU: HCPCS | Performed by: SURGERY

## 2022-06-01 PROCEDURE — 81001 URINALYSIS AUTO W/SCOPE: CPT | Performed by: EMERGENCY MEDICINE

## 2022-06-01 PROCEDURE — 85025 COMPLETE CBC W/AUTO DIFF WBC: CPT | Performed by: EMERGENCY MEDICINE

## 2022-06-01 PROCEDURE — 71045 X-RAY EXAM CHEST 1 VIEW: CPT

## 2022-06-01 PROCEDURE — C9803 HOPD COVID-19 SPEC COLLECT: HCPCS

## 2022-06-01 RX ORDER — LABETALOL HYDROCHLORIDE 5 MG/ML
10 INJECTION, SOLUTION INTRAVENOUS ONCE
Status: COMPLETED | OUTPATIENT
Start: 2022-06-01 | End: 2022-06-01

## 2022-06-01 RX ORDER — SODIUM CHLORIDE 0.9 % (FLUSH) 0.9 %
10 SYRINGE (ML) INJECTION AS NEEDED
Status: DISCONTINUED | OUTPATIENT
Start: 2022-06-01 | End: 2022-06-01 | Stop reason: HOSPADM

## 2022-06-01 RX ADMIN — LABETALOL HYDROCHLORIDE 10 MG: 5 INJECTION, SOLUTION INTRAVENOUS at 19:39

## 2022-06-01 NOTE — ED NOTES
Pt presents to ED with complains of weakness and fatigue x1 week. Pt is scheduled to have fistula to be placed in a few days. Pt is A&OX4, able to ambulate, and in a mask at this time.     Patient was placed in face mask during first look triage.  Patient was wearing a face mask throughout encounter.  I wore personal protective equipment throughout the encounter.  Hand hygiene was performed before and after patient encounter.

## 2022-06-01 NOTE — ED PROVIDER NOTES
EMERGENCY DEPARTMENT ENCOUNTER  I wore full protective equipment throughout this patient encounter including a N95 mask, eye shield, gown and gloves. Hand hygiene was performed before donning protective equipment and after removal when leaving the room.    Room Number:  20/20  Date of encounter:  6/2/2022  PCP: Florencia Caballero MD    HPI:  Context: Carlito Mo is a 67 y.o. male who presents to the ED c/o chief complaint of increasing fatigue over the past 4 weeks.  Patient states he has known end-stage renal disease and is scheduled Friday for a AV fistula placement for dialysis.  He has noted increasing shortness of air, orthopnea and chills.  He states his legs are chronically swollen and not gotten worse over the past couple weeks.  He has had trouble concentrating over the past several weeks.  He still makes urine and has noticed that he has peeing more than normal.  He has had increasing nausea.  Patient denies chest pain, fever, abdominal pain or diarrhea.  He denies dyspnea on exertion.  His wife called his primary physician who suggested he come into the emergency department for further evaluation and treatment.    MEDICAL HISTORY REVIEW  Reviewed in EPIC    PAST MEDICAL HISTORY  Active Ambulatory Problems     Diagnosis Date Noted   • Major depressive disorder with single episode, in partial remission (HCC)    • Other hyperlipidemia    • Vitamin D deficiency 12/17/2015   • Erectile dysfunction 12/17/2015   • Chronic fatigue 04/25/2016   • Type 2 diabetes mellitus with ophthalmic complication ([facility]) 04/25/2016   • Skin lesion of chest wall 06/20/2016   • Slow transit constipation 09/01/2016   • Benign prostatic hyperplasia with nocturia 12/20/2016   • History of basal cell carcinoma 12/20/2016   • High blood pressure associated with diabetes ([facility]) 12/20/2016   • Acquired hypothyroidism 12/20/2017   • Hypertensive urgency 02/16/2018   • Recurrent strokes ([facility]) 02/20/2018   • Urinary retention 09/20/2018   •  Pneumonia 09/21/2018   • Acute on chronic combined systolic and diastolic congestive heart failure (HCC) 09/21/2018   • Acute respiratory failure with hypoxia ([facility]) 09/21/2018   • Suspected sleep apnea 10/29/2018   • Pleural effusion 12/01/2018   • YAW (obstructive sleep apnea) 12/13/2018   • Coronary artery disease involving native coronary artery of native heart without angina pectoris 04/18/2019   • Chronically elevated troponin 07/02/2019   • Colon cancer screening 10/30/2018   • Enlarged lymph nodes 10/30/2018   • Functional gait abnormality 10/30/2018   • History of myocardial infarction 02/06/2019   • Hospital discharge follow-up 05/31/2019   • Insomnia 02/06/2019   • Iron deficiency anemia 10/02/2018   • Major depression, recurrent ([facility]) 10/16/2012   • Anemia, chronic renal failure, stage 3 (moderate) ([facility]) 02/27/2020   • Essential hypertension 03/10/2020   • Adverse effect of iron and its compounds, initial encounter 05/23/2020   • Acute on chronic renal insufficiency 05/25/2020   • Debility 05/25/2020   • Falls frequently 05/25/2020   • Acute pain of right shoulder 05/27/2020   • Acute on chronic anemia 05/25/2020   • Heme positive stool 05/25/2020   • Neurogenic orthostatic hypotension (HCC) 05/30/2020   • Symptomatic anemia 05/25/2020   • History of colon polyps 05/25/2020   • Helicobacter pylori gastritis 06/04/2020   • Esophagitis 06/10/2020   • Sleep related hypoxia 07/23/2020   • Embolic stroke ([facility]) 05/20/2021   • Patent foramen ovale 05/22/2021   • Pleural effusion, bilateral 05/22/2021   • Cardiomyopathy ([facility]) 05/24/2021   • Lower abdominal pain 12/13/2021   • Diarrhea 12/14/2021   • CKD (chronic kidney disease) stage 4, GFR 15-29 ml/min ([facility]) 12/16/2021   • Hypertension not at goal 02/02/2022   • Memory loss due to medical condition 02/08/2022   • Generalized weakness 03/01/2022   • ERRONEOUS ENCOUNTER--DISREGARD 03/09/2022     Resolved Ambulatory Problems     Diagnosis Date Noted   • Anemia    •  Arthritis    • Diabetes mellitus out of control    • Type 2 diabetes mellitus (HCC)    • Acute sinusitis    • Accumulation of fluid in tissues 12/17/2015   • Fatigue 12/17/2015   • Cardiomyopathy, ischemic 12/17/2015   • Acute appendicitis 03/02/2016   • Atherosclerosis of coronary artery 10/16/2012   • Altered mental status 11/30/2017   • Hypertensive encephalopathy syndrome 04/30/2018   • History of medication noncompliance 05/04/2018   • Hyperglycemia 05/04/2018   • Screening for colorectal cancer 08/22/2018   • Constipation 08/22/2018   • SYLVAIN (acute kidney injury) (HCC) 09/20/2018   • Snoring 12/13/2018   • Lower abdominal pain 05/27/2020   • Hypertensive emergency 02/01/2022     Past Medical History:   Diagnosis Date   • Acute congestive heart failure (HCC)    • CAD (coronary artery disease)    • Cancer ([facility])    • Chronic combined systolic and diastolic congestive heart failure (HCC)    • Chronic ischemic heart disease    • Chronic kidney disease (CKD)    • CKD (chronic kidney disease)    • COPD (chronic obstructive pulmonary disease) ([facility])    • Depression    • Diabetes mellitus type 2, uncontrolled, without complications    • Diabetic neuropathy (HCC)    • Disease of thyroid gland    • Elevated cholesterol    • Frequent PVCs    • GERD (gastroesophageal reflux disease)    • Heart attack (HCC)    • History of coronary artery disease    • Hyperlipidemia    • Hypertension    • Hypertensive encephalopathy    • Mental status change    • NO DEVICE/RECALLED    • Poor historian    • RBBB (right bundle branch block)    • Stroke (HCC)    • TIA (transient ischemic attack)        PAST SURGICAL HISTORY  Past Surgical History:   Procedure Laterality Date   • APPENDECTOMY N/A 02/14/2016    Dr. Alexey Dodson   • COLONOSCOPY      over 20 years ago.  no polyps    • COLONOSCOPY N/A 9/18/2018    Procedure: COLONOSCOPY;  Surgeon: Jose Enrique Louie MD;  Location: [facility] ENDOSCOPY;  Service: Gastroenterology   • COLONOSCOPY  N/A 2018    IH, EH, polyps (TAs w/low grade dysplasia)   • COLONOSCOPY N/A 2020    Procedure: COLONOSCOPY;  Surgeon: Jose Enrique Louie MD;  Location:  [facility] ENDOSCOPY;  Service: Gastroenterology;  Laterality: N/A;  Pre op-Anemia, Melena, History of Polyps  Post op-To Cecum/TI, Polyp, Poor Prep   • CORONARY ARTERY BYPASS GRAFT  2001   • ENDOSCOPY N/A 2020    Procedure: ESOPHAGOGASTRODUODENOSCOPY with biopsies;  Surgeon: Amanda Lovelace MD;  [facility];  Service: Gastroenterology;  Laterality: N/A;  pre-anemia, dark stools  post-esophagitis, hiatal hernia   • ENDOSCOPY N/A 9/15/2020    Procedure: ESOPHAGOGASTRODUODENOSCOPY WITH BIOPSIES;  Surgeon: Jose Enrique Louie MD;  Location: [facility] ENDOSCOPY;  Service: Gastroenterology;  Laterality: N/A;  pre: history of melena and esophagitis  post: mild esophagitis and gastritis, small hiatal hernia   • HERNIA REPAIR Left 2019   • TONSILLECTOMY     • VASECTOMY         FAMILY HISTORY  Family History   Problem Relation Age of Onset   • Diabetes Mother    • Hypertension Mother    • Macular degeneration Mother    • Alcohol abuse Father    • Cancer Father         lung,  age 65   • Heart disease Father    • Alcohol abuse Brother    • Cirrhosis Brother          age 50   • Liver cancer Brother 45   • Diabetes Son    • Kidney failure Son    • Autism Son    • Malig Hyperthermia Neg Hx        SOCIAL HISTORY  Social History     Socioeconomic History   • Marital status:      Spouse name: Lissette   • Number of children: 3   • Years of education: college   Tobacco Use   • Smoking status: Never Smoker   • Smokeless tobacco: Never Used   • Tobacco comment: daily caffeine   Vaping Use   • Vaping Use: Never used   Substance and Sexual Activity   • Alcohol use: No   • Drug use: No   • Sexual activity: Defer       ALLERGIES  Prozac [fluoxetine hcl]    The patient's allergies have been reviewed    REVIEW OF SYSTEMS  All systems  reviewed and negative except for those discussed in HPI.     PHYSICAL EXAM  I have reviewed the triage vital signs and nursing notes.  ED Triage Vitals [06/01/22 1521]   Temp Heart Rate Resp BP SpO2   97.7 °F (36.5 °C) 62 20 177/84 98 %      Temp src Heart Rate Source Patient Position BP Location FiO2 (%)   -- -- -- -- --       General: Mild distress.  HENT: NCAT, PERRL, Nares patent.  Eyes: no scleral icterus.  Mildly pale conjunctiva.  Neck: trachea midline, no ROM limitations.  CV: regular rhythm, regular rate.  Respiratory: normal effort, CTAB.  Abdomen: soft, nondistended, NTTP, no rebound tenderness, no guarding or rigidity.  Musculoskeletal: no deformity.  Plus edema bilaterally.  Neuro: alert, moves all extremities, follows commands.  Skin: warm, dry.  No pallor is noted.    LAB RESULTS  Recent Results (from the past 24 hour(s))   COVID-19,APTIMA PANTHER(SUSAN),[facility]/ [facility], NP/OP SWAB IN UTM/VTM/SALINE TRANSPORT MEDIA,24 HR TAT - Swab, Nasopharynx    Collection Time: 06/01/22 12:35 PM    Specimen: Nasopharynx; Swab   Result Value Ref Range    COVID19 Not Detected Not Detected - Ref. Range   Comprehensive Metabolic Panel    Collection Time: 06/01/22  7:34 PM    Specimen: Blood   Result Value Ref Range    Glucose 175 (H) 65 - 99 mg/dL    BUN 36 (H) 8 - 23 mg/dL    Creatinine 2.77 (H) 0.76 - 1.27 mg/dL    Sodium 138 136 - 145 mmol/L    Potassium 4.6 3.5 - 5.2 mmol/L    Chloride 108 (H) 98 - 107 mmol/L    CO2 21.2 (L) 22.0 - 29.0 mmol/L    Calcium 8.8 8.6 - 10.5 mg/dL    Total Protein 6.2 6.0 - 8.5 g/dL    Albumin 3.90 3.50 - 5.20 g/dL    ALT (SGPT) 15 1 - 41 U/L    AST (SGOT) 12 1 - 40 U/L    Alkaline Phosphatase 87 39 - 117 U/L    Total Bilirubin 0.2 0.0 - 1.2 mg/dL    Globulin 2.3 gm/dL    A/G Ratio 1.7 g/dL    BUN/Creatinine Ratio 13.0 7.0 - 25.0    Anion Gap 8.8 5.0 - 15.0 mmol/L    eGFR 24.3 (L) >60.0 mL/min/1.73   Protime-INR    Collection Time: 06/01/22  7:34 PM    Specimen: Blood   Result Value Ref  Range    Protime 14.4 (H) 11.7 - 14.2 Seconds    INR 1.13 (H) 0.90 - 1.10   CBC Auto Differential    Collection Time: 06/01/22  7:34 PM    Specimen: Blood   Result Value Ref Range    WBC 6.45 3.40 - 10.80 10*3/mm3    RBC 3.29 (L) 4.14 - 5.80 10*6/mm3    Hemoglobin 9.1 (L) 13.0 - 17.7 g/dL    Hematocrit 28.2 (L) 37.5 - 51.0 %    MCV 85.7 79.0 - 97.0 fL    MCH 27.7 26.6 - 33.0 pg    MCHC 32.3 31.5 - 35.7 g/dL    RDW 12.9 12.3 - 15.4 %    RDW-SD 39.3 37.0 - 54.0 fl    MPV 9.6 6.0 - 12.0 fL    Platelets 257 140 - 450 10*3/mm3    Neutrophil % 69.8 42.7 - 76.0 %    Lymphocyte % 17.5 (L) 19.6 - 45.3 %    Monocyte % 9.1 5.0 - 12.0 %    Eosinophil % 2.9 0.3 - 6.2 %    Basophil % 0.2 0.0 - 1.5 %    Immature Grans % 0.5 0.0 - 0.5 %    Neutrophils, Absolute 4.50 1.70 - 7.00 10*3/mm3    Lymphocytes, Absolute 1.13 0.70 - 3.10 10*3/mm3    Monocytes, Absolute 0.59 0.10 - 0.90 10*3/mm3    Eosinophils, Absolute 0.19 0.00 - 0.40 10*3/mm3    Basophils, Absolute 0.01 0.00 - 0.20 10*3/mm3    Immature Grans, Absolute 0.03 0.00 - 0.05 10*3/mm3    nRBC 0.0 0.0 - 0.2 /100 WBC   ECG 12 Lead    Collection Time: 06/01/22  7:55 PM   Result Value Ref Range    QT Interval 506 ms   Urinalysis With Microscopic If Indicated (No Culture) - Urine, Clean Catch    Collection Time: 06/01/22  8:35 PM    Specimen: Urine, Clean Catch   Result Value Ref Range    Color, UA Yellow Yellow, Straw    Appearance, UA Clear Clear    pH, UA 5.5 5.0 - 8.0    Specific Gravity, UA 1.018 1.005 - 1.030    Glucose,  mg/dL (Trace) (A) Negative    Ketones, UA Negative Negative    Bilirubin, UA Negative Negative    Blood, UA Small (1+) (A) Negative    Protein, UA >=300 mg/dL (3+) (A) Negative    Leuk Esterase, UA Negative Negative    Nitrite, UA Negative Negative    Urobilinogen, UA 0.2 E.U./dL 0.2 - 1.0 E.U./dL   Urinalysis, Microscopic Only - Urine, Clean Catch    Collection Time: 06/01/22  8:35 PM    Specimen: Urine, Clean Catch   Result Value Ref Range    RBC, UA 0-2  None Seen, 0-2 /HPF    WBC, UA 0-2 None Seen, 0-2 /HPF    Bacteria, UA None Seen None Seen /HPF    Squamous Epithelial Cells, UA 0-2 None Seen, 0-2 /HPF    Hyaline Casts, UA 3-6 None Seen /LPF    Methodology Automated Microscopy        I ordered the above labs and reviewed the results.    RADIOLOGY  XR Chest 1 View    Result Date: 6/1/2022  XR CHEST 1 VW-  HISTORY: Male who is 67 years-old, fatigue, end-stage renal disease  TECHNIQUE: Frontal views of the chest  COMPARISON: 02/01/2022  FINDINGS: The heart size is normal. Aorta is tortuous, calcified. Sternotomy wires are present. Pulmonary vasculature is unremarkable. No focal pulmonary consolidation, pleural effusion, or pneumothorax. No acute osseous process.      No focal pulmonary consolidation. Tortuous aorta. Follow-up/further evaluation can be obtained as clinically indicated.  This report was finalized on 6/1/2022 7:45 PM by Dr. Drake Dennis M.D.        I ordered the above noted radiological studies. I reviewed the images and results. I agree with the radiologist interpretation.    PROCEDURES  Procedures    MEDICATIONS GIVEN IN ER  Medications   labetalol (NORMODYNE,TRANDATE) injection 10 mg (10 mg Intravenous Given 6/1/22 1939)       PROGRESS, DATA ANALYSIS, CONSULTS, AND MEDICAL DECISION MAKING  A complete history and physical exam have been performed.  All available laboratory and imaging results have been reviewed by myself prior to disposition.    MDM  After the initial H&P, I discussed pertinent information from history and physical exam with patient/family.  Discussed differential diagnosis.  Discussed plan for ED evaluation/workup/treatment.  All questions answered.  Patient/family is agreeable with plan.  ED Course as of 06/02/22 0302   Wed Jun 01, 2022   1905 Patient presents with increasing fatigue and trouble concentrating for the past 1 to 2 weeks.  Patient is hypertensive here he states he has not taken his medications today.  We will  rule out worsening renal failure and fluid overload.  We are checking to make sure his gabriel has not worsened. [DE]   1948 Hemoglobin(!): 9.1 [DE]   2000 EKG          EKG time: 1955  Rhythm/Rate: 1955  P waves and AR: First-degree AV block  QRS, axis: RBBB and LAFB  ST and T waves: RBBB    Interpreted Contemporaneously by me, independently viewed  unchanged compared to prior 03/01/2022   [DE]   2025 2 weeks ago, patient's BUN was 53 and creatinine was 3.29. [DE]   2031 Patient's blood pressure is 176/89. [DE]   2036 I have updated patient on the results of his blood work and chest x-ray.  I have advised him that his blood pressure is better and he does appear better.  He mentioned that he may end up being able to go home this evening.  He would like to go home. [DE]   2037 BP: 176/89 [DE]   2100 Blood, UA(!): Small (1+) [DE]   2100 Protein, UA(!): >=300 mg/dL (3+) [DE]      ED Course User Index  [DE] Harry Archuleta MD       AS OF 03:02 EDT VITALS:    BP - 173/85  HR - 60  TEMP - 98.4 °F (36.9 °C) (Oral)  O2 SATS - 98%    DIAGNOSIS  Final diagnoses:   Uncontrolled hypertension   CKD (chronic kidney disease) stage 4, GFR 15-29 ml/min (HCC)   Anemia, chronic renal failure, stage 3 (moderate) ([facility])   Chronic fatigue         DISPOSITION     DISCHARGE    Patient discharged in stable condition.    Reviewed implications of results, diagnosis, meds, responsibility to follow up, warning signs and symptoms of possible worsening, potential complications and reasons to return to ER.    Patient/Family voiced understanding of above instructions.    Discussed plan for discharge, as there is no emergent indication for admission. Patient referred to primary care provider for BP management due to today's BP. Pt/family is agreeable and understands need for follow up and repeat testing.  Pt is aware that discharge does not mean that nothing is wrong but it indicates no emergency is present that requires admission and they must  continue care with follow-up as given below or physician of their choice.     FOLLOW-UP  Florencia Caballero MD  [address] [address]  [phone]    Schedule an appointment as soon as possible for a visit            Medication List      Changed    Apidra 100 UNIT/ML injection  Generic drug: insulin glulisine  Inject 2 Units under the skin into the appropriate area as directed 3 (Three) Times a Day Before Meals. Patient says he does not take consistently  What changed: additional instructions     BASAGLAR KWIKPEN 100 UNIT/ML injection pen  Inject 4 Units under the skin into the appropriate area as directed Every Night. Patient says he does not take consistently  What changed: additional instructions     Eliquis 2.5 MG tablet tablet  Generic drug: apixaban  Take 1 tablet by mouth Every 12 (Twelve) Hours. Indications: Other - full anticoagulation  What changed: additional instructions             Harry Archuleta MD  06/02/22 [id number]

## 2022-06-02 NOTE — DISCHARGE INSTRUCTIONS
Continue your home medications including your blood pressure medications.  Do follow-up with your vascular surgeon as scheduled for Friday.  Please return to the emergency department if symptoms worsen.

## 2022-06-03 ENCOUNTER — HOSPITAL ENCOUNTER (INPATIENT)
Facility: HOSPITAL | Age: 67
LOS: 3 days | Discharge: HOME HEALTH | End: 2022-06-06
Attending: EMERGENCY MEDICINE | Admitting: HOSPITALIST

## 2022-06-03 ENCOUNTER — ANESTHESIA EVENT (OUTPATIENT)
Dept: PERIOP | Facility: HOSPITAL | Age: 67
End: 2022-06-03

## 2022-06-03 ENCOUNTER — APPOINTMENT (OUTPATIENT)
Dept: GENERAL RADIOLOGY | Facility: HOSPITAL | Age: 67
End: 2022-06-03

## 2022-06-03 ENCOUNTER — HOSPITAL ENCOUNTER (OUTPATIENT)
Facility: HOSPITAL | Age: 67
Setting detail: HOSPITAL OUTPATIENT SURGERY
Discharge: HOME | End: 2022-06-03
Attending: SURGERY | Admitting: SURGERY

## 2022-06-03 ENCOUNTER — ANESTHESIA (OUTPATIENT)
Dept: PERIOP | Facility: HOSPITAL | Age: 67
End: 2022-06-03

## 2022-06-03 VITALS
DIASTOLIC BLOOD PRESSURE: 87 MMHG | BODY MASS INDEX: 24.16 KG/M2 | OXYGEN SATURATION: 98 % | RESPIRATION RATE: 18 BRPM | HEIGHT: 72 IN | SYSTOLIC BLOOD PRESSURE: 176 MMHG | WEIGHT: 178.4 LBS | HEART RATE: 57 BPM | TEMPERATURE: 97.6 F

## 2022-06-03 DIAGNOSIS — N28.9 ACUTE ON CHRONIC RENAL INSUFFICIENCY: Primary | ICD-10-CM

## 2022-06-03 DIAGNOSIS — N18.9 CHRONIC KIDNEY DISEASE, UNSPECIFIED CKD STAGE: ICD-10-CM

## 2022-06-03 DIAGNOSIS — R53.1 WEAKNESS: Primary | ICD-10-CM

## 2022-06-03 DIAGNOSIS — N18.9 ACUTE ON CHRONIC RENAL INSUFFICIENCY: Primary | ICD-10-CM

## 2022-06-03 DIAGNOSIS — I10 HYPERTENSION, UNSPECIFIED TYPE: ICD-10-CM

## 2022-06-03 LAB
ALBUMIN SERPL-MCNC: 4.1 G/DL (ref 3.5–5.2)
ALBUMIN/GLOB SERPL: 1.1 G/DL
ALP SERPL-CCNC: 97 U/L (ref 39–117)
ALT SERPL W P-5'-P-CCNC: 14 U/L (ref 1–41)
ANION GAP SERPL CALCULATED.3IONS-SCNC: 11 MMOL/L (ref 5–15)
ANION GAP SERPL CALCULATED.3IONS-SCNC: 7.5 MMOL/L (ref 5–15)
AST SERPL-CCNC: 21 U/L (ref 1–40)
BACTERIA UR QL AUTO: NORMAL /HPF
BASOPHILS # BLD AUTO: 0.03 10*3/MM3 (ref 0–0.2)
BASOPHILS NFR BLD AUTO: 0.3 % (ref 0–1.5)
BILIRUB SERPL-MCNC: 0.2 MG/DL (ref 0–1.2)
BILIRUB UR QL STRIP: NEGATIVE
BUN SERPL-MCNC: 36 MG/DL (ref 8–23)
BUN SERPL-MCNC: 38 MG/DL (ref 8–23)
BUN/CREAT SERPL: 13.8 (ref 7–25)
BUN/CREAT SERPL: 14.6 (ref 7–25)
CALCIUM SPEC-SCNC: 8.7 MG/DL (ref 8.6–10.5)
CALCIUM SPEC-SCNC: 9.3 MG/DL (ref 8.6–10.5)
CHLORIDE SERPL-SCNC: 107 MMOL/L (ref 98–107)
CHLORIDE SERPL-SCNC: 108 MMOL/L (ref 98–107)
CLARITY UR: CLEAR
CO2 SERPL-SCNC: 20.5 MMOL/L (ref 22–29)
CO2 SERPL-SCNC: 21 MMOL/L (ref 22–29)
COLOR UR: YELLOW
CREAT SERPL-MCNC: 2.6 MG/DL (ref 0.76–1.27)
CREAT SERPL-MCNC: 2.61 MG/DL (ref 0.76–1.27)
DEPRECATED RDW RBC AUTO: 39.3 FL (ref 37–54)
EGFRCR SERPLBLD CKD-EPI 2021: 26.1 ML/MIN/1.73
EGFRCR SERPLBLD CKD-EPI 2021: 26.2 ML/MIN/1.73
EOSINOPHIL # BLD AUTO: 0.21 10*3/MM3 (ref 0–0.4)
EOSINOPHIL NFR BLD AUTO: 2.1 % (ref 0.3–6.2)
ERYTHROCYTE [DISTWIDTH] IN BLOOD BY AUTOMATED COUNT: 12.9 % (ref 12.3–15.4)
GLOBULIN UR ELPH-MCNC: 3.6 GM/DL
GLUCOSE BLDC GLUCOMTR-MCNC: 156 MG/DL (ref 70–130)
GLUCOSE SERPL-MCNC: 133 MG/DL (ref 65–99)
GLUCOSE SERPL-MCNC: 144 MG/DL (ref 65–99)
GLUCOSE UR STRIP-MCNC: NEGATIVE MG/DL
HCT VFR BLD AUTO: 31.8 % (ref 37.5–51)
HGB BLD-MCNC: 10.4 G/DL (ref 13–17.7)
HGB UR QL STRIP.AUTO: ABNORMAL
HOLD SPECIMEN: NORMAL
HYALINE CASTS UR QL AUTO: NORMAL /LPF
IMM GRANULOCYTES # BLD AUTO: 0.04 10*3/MM3 (ref 0–0.05)
IMM GRANULOCYTES NFR BLD AUTO: 0.4 % (ref 0–0.5)
KETONES UR QL STRIP: NEGATIVE
LEUKOCYTE ESTERASE UR QL STRIP.AUTO: NEGATIVE
LYMPHOCYTES # BLD AUTO: 0.99 10*3/MM3 (ref 0.7–3.1)
LYMPHOCYTES NFR BLD AUTO: 9.9 % (ref 19.6–45.3)
MAGNESIUM SERPL-MCNC: 2.2 MG/DL (ref 1.6–2.4)
MCH RBC QN AUTO: 27.7 PG (ref 26.6–33)
MCHC RBC AUTO-ENTMCNC: 32.7 G/DL (ref 31.5–35.7)
MCV RBC AUTO: 84.6 FL (ref 79–97)
MONOCYTES # BLD AUTO: 0.61 10*3/MM3 (ref 0.1–0.9)
MONOCYTES NFR BLD AUTO: 6.1 % (ref 5–12)
NEUTROPHILS NFR BLD AUTO: 8.1 10*3/MM3 (ref 1.7–7)
NEUTROPHILS NFR BLD AUTO: 81.2 % (ref 42.7–76)
NITRITE UR QL STRIP: NEGATIVE
NRBC BLD AUTO-RTO: 0 /100 WBC (ref 0–0.2)
PH UR STRIP.AUTO: <=5 [PH] (ref 5–8)
PLATELET # BLD AUTO: 340 10*3/MM3 (ref 140–450)
PMV BLD AUTO: 9.7 FL (ref 6–12)
POTASSIUM SERPL-SCNC: 4.2 MMOL/L (ref 3.5–5.2)
POTASSIUM SERPL-SCNC: 5 MMOL/L (ref 3.5–5.2)
PROT SERPL-MCNC: 7.7 G/DL (ref 6–8.5)
PROT UR QL STRIP: ABNORMAL
QT INTERVAL: 506 MS
RBC # BLD AUTO: 3.76 10*6/MM3 (ref 4.14–5.8)
RBC # UR STRIP: NORMAL /HPF
REF LAB TEST METHOD: NORMAL
SODIUM SERPL-SCNC: 136 MMOL/L (ref 136–145)
SODIUM SERPL-SCNC: 139 MMOL/L (ref 136–145)
SP GR UR STRIP: 1.02 (ref 1–1.03)
SQUAMOUS #/AREA URNS HPF: NORMAL /HPF
TROPONIN T SERPL-MCNC: 0.1 NG/ML (ref 0–0.03)
UROBILINOGEN UR QL STRIP: ABNORMAL
WBC # UR STRIP: NORMAL /HPF
WBC NRBC COR # BLD: 9.98 10*3/MM3 (ref 3.4–10.8)
WHOLE BLOOD HOLD COAG: NORMAL
WHOLE BLOOD HOLD SPECIMEN: NORMAL

## 2022-06-03 PROCEDURE — 25010000002 PROPOFOL 10 MG/ML EMULSION: Performed by: NURSE ANESTHETIST, CERTIFIED REGISTERED

## 2022-06-03 PROCEDURE — 93010 ELECTROCARDIOGRAM REPORT: CPT | Performed by: INTERNAL MEDICINE

## 2022-06-03 PROCEDURE — 83735 ASSAY OF MAGNESIUM: CPT | Performed by: EMERGENCY MEDICINE

## 2022-06-03 PROCEDURE — 93005 ELECTROCARDIOGRAM TRACING: CPT

## 2022-06-03 PROCEDURE — 71045 X-RAY EXAM CHEST 1 VIEW: CPT

## 2022-06-03 PROCEDURE — 93005 ELECTROCARDIOGRAM TRACING: CPT | Performed by: EMERGENCY MEDICINE

## 2022-06-03 PROCEDURE — 36415 COLL VENOUS BLD VENIPUNCTURE: CPT

## 2022-06-03 PROCEDURE — 85025 COMPLETE CBC W/AUTO DIFF WBC: CPT

## 2022-06-03 PROCEDURE — 25010000002 ROPIVACAINE PER 1 MG: Performed by: ANESTHESIOLOGY

## 2022-06-03 PROCEDURE — 25010000002 CEFAZOLIN IN DEXTROSE 2-4 GM/100ML-% SOLUTION: Performed by: SURGERY

## 2022-06-03 PROCEDURE — 82962 GLUCOSE BLOOD TEST: CPT

## 2022-06-03 PROCEDURE — 25010000002 CEFAZOLIN PER 500 MG: Performed by: SURGERY

## 2022-06-03 PROCEDURE — 25010000002 MIDAZOLAM PER 1 MG: Performed by: ANESTHESIOLOGY

## 2022-06-03 PROCEDURE — 25010000002 HEPARIN (PORCINE) PER 1000 UNITS: Performed by: SURGERY

## 2022-06-03 PROCEDURE — 05SD0ZZ REPOSITION RIGHT CEPHALIC VEIN, OPEN APPROACH: ICD-10-PCS | Performed by: SURGERY

## 2022-06-03 PROCEDURE — 25010000002 FENTANYL CITRATE (PF) 50 MCG/ML SOLUTION: Performed by: ANESTHESIOLOGY

## 2022-06-03 PROCEDURE — 76942 ECHO GUIDE FOR BIOPSY: CPT | Performed by: SURGERY

## 2022-06-03 PROCEDURE — 80053 COMPREHEN METABOLIC PANEL: CPT | Performed by: SURGERY

## 2022-06-03 PROCEDURE — 84484 ASSAY OF TROPONIN QUANT: CPT | Performed by: EMERGENCY MEDICINE

## 2022-06-03 PROCEDURE — 99284 EMERGENCY DEPT VISIT MOD MDM: CPT

## 2022-06-03 PROCEDURE — 25010000002 HEPARIN (PORCINE) PER 1000 UNITS: Performed by: NURSE ANESTHETIST, CERTIFIED REGISTERED

## 2022-06-03 PROCEDURE — 81001 URINALYSIS AUTO W/SCOPE: CPT | Performed by: EMERGENCY MEDICINE

## 2022-06-03 DEVICE — LIGACLIP MCA MULTIPLE CLIP APPLIERS, 20 SMALL CLIPS
Type: IMPLANTABLE DEVICE | Site: ARM | Status: FUNCTIONAL
Brand: LIGACLIP

## 2022-06-03 RX ORDER — FENTANYL CITRATE 50 UG/ML
25 INJECTION, SOLUTION INTRAMUSCULAR; INTRAVENOUS
Status: DISCONTINUED | OUTPATIENT
Start: 2022-06-03 | End: 2022-06-03 | Stop reason: HOSPADM

## 2022-06-03 RX ORDER — DIPHENHYDRAMINE HYDROCHLORIDE 50 MG/ML
12.5 INJECTION INTRAMUSCULAR; INTRAVENOUS
Status: DISCONTINUED | OUTPATIENT
Start: 2022-06-03 | End: 2022-06-03 | Stop reason: HOSPADM

## 2022-06-03 RX ORDER — FENTANYL CITRATE 50 UG/ML
50 INJECTION, SOLUTION INTRAMUSCULAR; INTRAVENOUS
Status: DISCONTINUED | OUTPATIENT
Start: 2022-06-03 | End: 2022-06-03 | Stop reason: HOSPADM

## 2022-06-03 RX ORDER — FLUMAZENIL 0.1 MG/ML
0.2 INJECTION INTRAVENOUS AS NEEDED
Status: DISCONTINUED | OUTPATIENT
Start: 2022-06-03 | End: 2022-06-03 | Stop reason: HOSPADM

## 2022-06-03 RX ORDER — SODIUM CHLORIDE 0.9 % (FLUSH) 0.9 %
10 SYRINGE (ML) INJECTION AS NEEDED
Status: DISCONTINUED | OUTPATIENT
Start: 2022-06-03 | End: 2022-06-06

## 2022-06-03 RX ORDER — CEFAZOLIN SODIUM 2 G/100ML
2 INJECTION, SOLUTION INTRAVENOUS ONCE
Status: COMPLETED | OUTPATIENT
Start: 2022-06-03 | End: 2022-06-03

## 2022-06-03 RX ORDER — SODIUM CHLORIDE, SODIUM LACTATE, POTASSIUM CHLORIDE, CALCIUM CHLORIDE 600; 310; 30; 20 MG/100ML; MG/100ML; MG/100ML; MG/100ML
INJECTION, SOLUTION INTRAVENOUS CONTINUOUS PRN
Status: DISCONTINUED | OUTPATIENT
Start: 2022-06-03 | End: 2022-06-03 | Stop reason: SURG

## 2022-06-03 RX ORDER — SODIUM CHLORIDE 0.9 % (FLUSH) 0.9 %
10 SYRINGE (ML) INJECTION AS NEEDED
Status: DISCONTINUED | OUTPATIENT
Start: 2022-06-03 | End: 2022-06-03 | Stop reason: HOSPADM

## 2022-06-03 RX ORDER — HEPARIN SODIUM 1000 [USP'U]/ML
INJECTION, SOLUTION INTRAVENOUS; SUBCUTANEOUS AS NEEDED
Status: DISCONTINUED | OUTPATIENT
Start: 2022-06-03 | End: 2022-06-03 | Stop reason: SURG

## 2022-06-03 RX ORDER — HYDROCODONE BITARTRATE AND ACETAMINOPHEN 7.5; 325 MG/1; MG/1
1 TABLET ORAL ONCE
Status: COMPLETED | OUTPATIENT
Start: 2022-06-03 | End: 2022-06-03

## 2022-06-03 RX ORDER — HYDROCODONE BITARTRATE AND ACETAMINOPHEN 5; 325 MG/1; MG/1
1 TABLET ORAL ONCE AS NEEDED
Status: DISCONTINUED | OUTPATIENT
Start: 2022-06-03 | End: 2022-06-03 | Stop reason: HOSPADM

## 2022-06-03 RX ORDER — MIDAZOLAM HYDROCHLORIDE 1 MG/ML
INJECTION INTRAMUSCULAR; INTRAVENOUS
Status: COMPLETED | OUTPATIENT
Start: 2022-06-03 | End: 2022-06-03

## 2022-06-03 RX ORDER — HYDROCODONE BITARTRATE AND ACETAMINOPHEN 5; 325 MG/1; MG/1
1-2 TABLET ORAL EVERY 6 HOURS PRN
Qty: 40 TABLET | Refills: 0 | Status: ON HOLD | OUTPATIENT
Start: 2022-06-03 | End: 2022-07-24

## 2022-06-03 RX ORDER — HYDROMORPHONE HYDROCHLORIDE 1 MG/ML
0.5 INJECTION, SOLUTION INTRAMUSCULAR; INTRAVENOUS; SUBCUTANEOUS
Status: DISCONTINUED | OUTPATIENT
Start: 2022-06-03 | End: 2022-06-03 | Stop reason: HOSPADM

## 2022-06-03 RX ORDER — MIDAZOLAM HYDROCHLORIDE 1 MG/ML
0.5 INJECTION INTRAMUSCULAR; INTRAVENOUS
Status: DISCONTINUED | OUTPATIENT
Start: 2022-06-03 | End: 2022-06-03 | Stop reason: HOSPADM

## 2022-06-03 RX ORDER — ROPIVACAINE HYDROCHLORIDE 5 MG/ML
INJECTION, SOLUTION EPIDURAL; INFILTRATION; PERINEURAL
Status: COMPLETED | OUTPATIENT
Start: 2022-06-03 | End: 2022-06-03

## 2022-06-03 RX ORDER — HYDRALAZINE HYDROCHLORIDE 20 MG/ML
5 INJECTION INTRAMUSCULAR; INTRAVENOUS
Status: DISCONTINUED | OUTPATIENT
Start: 2022-06-03 | End: 2022-06-03 | Stop reason: HOSPADM

## 2022-06-03 RX ORDER — ONDANSETRON 2 MG/ML
4 INJECTION INTRAMUSCULAR; INTRAVENOUS ONCE AS NEEDED
Status: DISCONTINUED | OUTPATIENT
Start: 2022-06-03 | End: 2022-06-03 | Stop reason: HOSPADM

## 2022-06-03 RX ORDER — SODIUM CHLORIDE 0.9 % (FLUSH) 0.9 %
10 SYRINGE (ML) INJECTION EVERY 12 HOURS SCHEDULED
Status: DISCONTINUED | OUTPATIENT
Start: 2022-06-03 | End: 2022-06-03 | Stop reason: HOSPADM

## 2022-06-03 RX ORDER — DIPHENHYDRAMINE HCL 25 MG
25 CAPSULE ORAL
Status: DISCONTINUED | OUTPATIENT
Start: 2022-06-03 | End: 2022-06-03 | Stop reason: HOSPADM

## 2022-06-03 RX ORDER — PROPOFOL 10 MG/ML
VIAL (ML) INTRAVENOUS CONTINUOUS PRN
Status: DISCONTINUED | OUTPATIENT
Start: 2022-06-03 | End: 2022-06-03 | Stop reason: SURG

## 2022-06-03 RX ORDER — FAMOTIDINE 10 MG/ML
20 INJECTION, SOLUTION INTRAVENOUS
Status: COMPLETED | OUTPATIENT
Start: 2022-06-03 | End: 2022-06-03

## 2022-06-03 RX ORDER — FENTANYL CITRATE 50 UG/ML
INJECTION, SOLUTION INTRAMUSCULAR; INTRAVENOUS
Status: COMPLETED | OUTPATIENT
Start: 2022-06-03 | End: 2022-06-03

## 2022-06-03 RX ORDER — PROMETHAZINE HYDROCHLORIDE 25 MG/1
25 SUPPOSITORY RECTAL ONCE AS NEEDED
Status: DISCONTINUED | OUTPATIENT
Start: 2022-06-03 | End: 2022-06-03 | Stop reason: HOSPADM

## 2022-06-03 RX ORDER — LABETALOL HYDROCHLORIDE 5 MG/ML
5 INJECTION, SOLUTION INTRAVENOUS
Status: DISCONTINUED | OUTPATIENT
Start: 2022-06-03 | End: 2022-06-03 | Stop reason: HOSPADM

## 2022-06-03 RX ORDER — EPHEDRINE SULFATE 50 MG/ML
5 INJECTION, SOLUTION INTRAVENOUS ONCE AS NEEDED
Status: DISCONTINUED | OUTPATIENT
Start: 2022-06-03 | End: 2022-06-03 | Stop reason: HOSPADM

## 2022-06-03 RX ORDER — PROMETHAZINE HYDROCHLORIDE 12.5 MG/1
25 TABLET ORAL ONCE AS NEEDED
Status: DISCONTINUED | OUTPATIENT
Start: 2022-06-03 | End: 2022-06-03 | Stop reason: HOSPADM

## 2022-06-03 RX ORDER — OXYCODONE AND ACETAMINOPHEN 7.5; 325 MG/1; MG/1
1 TABLET ORAL EVERY 4 HOURS PRN
Status: DISCONTINUED | OUTPATIENT
Start: 2022-06-03 | End: 2022-06-03 | Stop reason: HOSPADM

## 2022-06-03 RX ORDER — NALOXONE HCL 0.4 MG/ML
0.2 VIAL (ML) INJECTION AS NEEDED
Status: DISCONTINUED | OUTPATIENT
Start: 2022-06-03 | End: 2022-06-03 | Stop reason: HOSPADM

## 2022-06-03 RX ADMIN — FAMOTIDINE 20 MG: 10 INJECTION, SOLUTION INTRAVENOUS at 10:04

## 2022-06-03 RX ADMIN — FENTANYL CITRATE 50 MCG: 0.05 INJECTION, SOLUTION INTRAMUSCULAR; INTRAVENOUS at 10:04

## 2022-06-03 RX ADMIN — SODIUM CHLORIDE, POTASSIUM CHLORIDE, SODIUM LACTATE AND CALCIUM CHLORIDE: 600; 310; 30; 20 INJECTION, SOLUTION INTRAVENOUS at 10:12

## 2022-06-03 RX ADMIN — CEFAZOLIN SODIUM 2 G: 2 INJECTION, SOLUTION INTRAVENOUS at 10:16

## 2022-06-03 RX ADMIN — MIDAZOLAM 1 MG: 1 INJECTION INTRAMUSCULAR; INTRAVENOUS at 10:04

## 2022-06-03 RX ADMIN — PROPOFOL 50 MCG/KG/MIN: 10 INJECTION, EMULSION INTRAVENOUS at 10:24

## 2022-06-03 RX ADMIN — HEPARIN SODIUM 3000 UNITS: 1000 INJECTION INTRAVENOUS; SUBCUTANEOUS at 11:02

## 2022-06-03 RX ADMIN — ROPIVACAINE HYDROCHLORIDE 20 ML: 5 INJECTION, SOLUTION EPIDURAL; INFILTRATION; PERINEURAL at 10:15

## 2022-06-03 RX ADMIN — MIDAZOLAM 1 MG: 1 INJECTION INTRAMUSCULAR; INTRAVENOUS at 10:08

## 2022-06-03 RX ADMIN — HYDROCODONE BITARTRATE AND ACETAMINOPHEN 1 TABLET: 7.5; 325 TABLET ORAL at 22:33

## 2022-06-03 NOTE — ANESTHESIA POSTPROCEDURE EVALUATION
"Patient: Carlito Mo    Procedure Summary     Date: 06/03/22 Room / Location: [facility] OR  / [facility] MAIN OR    Anesthesia Start: 1018 Anesthesia Stop: 1153    Procedure: RIGHT FOREARM ARTERIOVENOUS FISTULA PLACEMENT RADIOCEPHALIC WITH CEPHALIC VEIN TRANSPOSITION (Right ) Diagnosis:     Surgeons: Eliseo Willis MD Provider: Ced Goodman MD    Anesthesia Type: MAC, regional ASA Status: 3          Anesthesia Type: MAC, regional    Vitals  Vitals Value Taken Time   /107 06/03/22 1241   Temp 36.4 °C (97.6 °F) 06/03/22 1151   Pulse 59 06/03/22 1249   Resp 18 06/03/22 1240   SpO2 98 % 06/03/22 1249   Vitals shown include unvalidated device data.        Post Anesthesia Care and Evaluation    Patient location during evaluation: bedside  Patient participation: complete - patient participated  Level of consciousness: awake  Pain score: 2  Pain management: adequate  Airway patency: patent  Anesthetic complications: No anesthetic complications  PONV Status: none  Cardiovascular status: acceptable  Respiratory status: acceptable  Hydration status: acceptable    Comments: BP (!) 159/107 Comment: patient changing position  Pulse 62   Temp 36.4 °C (97.6 °F)   Resp 18   Ht 182.9 cm (72\")   Wt 80.9 kg (178 lb 6.4 oz)   SpO2 99%   BMI 24.20 kg/m²         "

## 2022-06-03 NOTE — ANESTHESIA PREPROCEDURE EVALUATION
Anesthesia Evaluation     NPO Solid Status: > 8 hours  NPO Liquid Status: > 2 hours           Airway   Mallampati: II  TM distance: >3 FB  Neck ROM: full  Dental    (+) poor dentition    Pulmonary    (+) COPD mild, sleep apnea,   Cardiovascular     (+) hypertension, CAD, CABG >6 Months, CHF (most recent echo LVEF 45) Systolic <55%, hyperlipidemia,       Neuro/Psych  (+) TIA,    GI/Hepatic/Renal/Endo    (+)  GERD well controlled,  renal disease ESRD, diabetes mellitus (did not take lantus last night) type 2 poorly controlled, thyroid problem hypothyroidism    Musculoskeletal     Abdominal    Substance History      OB/GYN          Other   arthritis,                    Anesthesia Plan    ASA 3     MAC and regional       Anesthetic plan, all risks, benefits, and alternatives have been provided, discussed and informed consent has been obtained with: patient.        CODE STATUS:

## 2022-06-03 NOTE — DISCHARGE INSTRUCTIONS
[facility]  Drake Joseph, Ramón Willis Scherrer, Thomas, Henrik  [address], [address]  [phone]    Post-Operative Instructions for AV Fistula / Graft   Diet: Regular Diet    Medications: Take your regularly scheduled medications on the day of your surgery, unless your doctor has directed you otherwise. You may be sent home with a prescription for pain medication, follow the directions as prescribed.    Activity Restrictions / Driving: Avoid lifting more than 15 pounds or other activities that stress or compress the access area. No driving for the remainder of the day after surgery. You may drive when you no longer are taking narcotic pain medications. If a nerve block was done to numb your arm for surgery, you will be placed in an arm sling.  This numbness and inability to move the arm can last for as little as 6 hours but as many as 18.  The sling should be used during this time but can be removed when sensation and movement of your arm is normal and does not need to be used after that. Use of the arm is encouraged after the surgery.    Incision Care: Some bruising is normal. If you have drainage from the incision please notify the office. Dressing should be removed in 48 hours. After dressing is removed, it is OK to shower. Do not submerge incision until cleared by your surgeon (bath or swimming).    Bathing and Showering: You may shower after you remove your dressing.    Follow-up Appointments: You will need to return to the office for a follow-up visit within 1-3 weeks after your surgery. Please make sure you have your appointment scheduled, call [phone].    The patient (you) should:  1. Avoid wearing tight constrictive clothing over that arm.  2. Avoid wearing jewelry that is tight, such as a watch on the access arm.  3. Avoid carrying heavy objects.  4. Avoid purse straps over the fistula.  5. Avoid sleeping on the arm or keeping it bent for extended periods of  "time.  6. Each day, using your opposite hand, feel over the fistula for the \"thrill\" or vibration that is normally present.    Fistula Information / Care:   It is normal to have swelling in the surgical area. To help control this swelling, you should elevate your arm on a pillow.   Wiggle your fingers and clinch your fist 10 times every hour, while awake, for the first 5-7 days. Also, bend and straighten at the elbow to regain normal range of motion. These exercises are designed to promote circulation in the fingers and aid in draining away the excess fluid accumulation in the immediate area.  No blood pressures or needle sticks in the arm with your access.    Call the office for the followin. Fever greater than 101.0  2. Uncontrolled pain. This is on a scale of 1-10 (10 being the worst pain imaginable) your pain is a level 7 or above.  3. It is important that you notify our office if you are having numbness and significant pain in the extremity in which you have just had surgery!  4. Decreased or absent thrill.  5. Nausea, diarrhea, and/or vomiting that continue for 12-24 hours.  6. Signs of an infection: redness, increased swelling, drainage, fever and/or chills.  7. Chest pain or difficulty breathing.    The fistula or graft CAN NOT be used until the MD has given written approval. Generally, a graft will be ready to use in 2 weeks, and a fistula will be ready to use in 6-8 weeks.     If you have further questions after reading this handout, the office is open from 8:30am to 5:00pm Monday through Friday. Call [phone].             What to expect after a Nerve Block    Nerve blocks administered to block pain affect many types of nerves, including those nerves that control movement, pain, and normal sensation. Following a nerve block, you may notice some bruising at the site where the block was given. You may experience sensations such as: numbness of the affected area or limb, tingling, heaviness (that " is the limb feels heavy to you), weakness or inability to move the affected arm or leg, or a feeling as if your arm or leg has “fallen asleep.”     A nerve block can last from 2 to 36 hours depending on the medications used.  Usually the weakness wears off first followed by the tingling and heaviness. As the block wears off, you may begin to notice pain; however, this sequence of events may occur in any order. Typically, you will be able to move your limb before you will feel it. Once a nerve block begins to wear off, the effects are usually completely gone within 60 minutes.  If you experience continued side effects that you believe are block related for longer than 48 hours, please call your healthcare provider. Please see block-specific instructions below.    Instructions for any block involving the shoulder or arm  If you have had any kind of shoulder/arm block, you will go home with your arm in a sling. Wear the sling until the block has completely worn off. You may be required to wear it for a longer period of time per your surgeon’s recommendations.  If you have had a shoulder/arm block, it is a good idea to sleep on a recliner with pillows under your arm.    You may experience symptoms such as:  Shortness of breath  Hoarseness   Blurry vision  Unequal pupils  Drooping of your face on the same side as the block was performed    These are side effects associated with this kind of block and should go away within 12 hours.    Note: If you have severe or prolonged shortness of breath, please seek medical assistance as soon as possible.     Protection of a “blocked” arm or leg (limb)  After a nerve block, you cannot feel pain, pressure, or extremes of temperature in the affected limb. And because of this, your blocked limb is at more risk for injury. For example, it is possible to burn your limb on an extremely hot surface without feeling it.     When resting, it is important to reposition your limb periodically  to avoid prolonged pressure on it. This may require the use of pillows and padding.    While sleeping, you should avoid rolling onto the affected limb or putting too much pressure on it.     If you have a cast or tight dressing, check the color of your fingers or toes of the affected limb. Call your surgeon if they look discolored (that is, dusky, dark colored).    Use caution in cold weather. Cover your limb appropriately to protect it from the cold.      Pain Management:    Your surgeon will give you a prescription for pain medication. Begin taking this before the nerve block wears off. Bear in mind that sometimes the block can wear off in the middle of the night.

## 2022-06-03 NOTE — CASE MANAGEMENT/SOCIAL WORK
Continued Stay Note  [facility]     Patient Name: Carlito Mo  MRN: [id number]  Today's Date: 6/3/2022    Admit Date: 6/3/2022     Discharge Plan     Row Name 06/03/22 1120       Plan    Plan Home    Plan Comments [facility] received inbound call from manager Maryanne regarding referral to case management by patient's nurse Arlene Franks regarding concern patient expressed regarding being behind on his mortgage. [facility] made outbound call to patient's nurse Heather who stated patient was in surgery but his wife was in the wating room and she was wearing a pink shirt. CCP went to the waiting room but was unsuccessful at locating patient's wife. CCP returned to the office and call patient's nurse Heather back to obtain patient's MRN nubmer and name/number for patient's wife. [facility] made outbound call to patient's wife Neal ([phone]), she answered, CCP introduced self and explained role. Neal verbalized that they were 2 months behind on their mortgage, for May and June. [facility] received verbal permission via telephone from Neal to make referral to [facility] for some financial assistance for patient. CCP made outbound call to GISELLE Veronica for referral to be made to [facility]. CCP received inbound call from GISELLE Veronica stating referral to [facility] has been made for patient. CCP to follow up with patient's wife Sofiya regarding referral being made to [facility] as well as any other support systems the family has in place or made need assistance with getting in place.               Discharge Codes    No documentation.
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
## 2022-06-03 NOTE — PERIOPERATIVE NURSING NOTE
Patient has no productive coughed x 3---Dr Parker notified, 02 sat 97%, patient in no acute distress.

## 2022-06-03 NOTE — OP NOTE
Operative Note  Date of Admission:  6/3/2022  OR Date: 6/3/2022    Pre-op Diagnosis:   Chronic renal failure with acute on  chronic changes    Post-Op Diagnosis Codes:  Same    Procedure:   1) right radiocephalic arteriovenous fistula formation with cephalic vein transposition    Surgeon: Garrett Willis MD    Assistant: Steffany Lorenzo and they provided critical assistance during the case including suctioning, exposure, retraction, and reduction of blood loss.    Anesthesia: Monitored Anesthesia Care with [facility]    Staff:   Circulator: Amanda Yoon RN  Scrub Person: Latasha Gross; Justine Nolasco PCT  Assistant: Steffany Lorenzo, CST  Orientee: Abran Landers RN  Other: Marilu Bravo    Estimated Blood Loss: minimal    Specimens:   Order Name Source Comment Collection Info Order Time   BASIC METABOLIC PANEL   Collected By: Emily Franks RN 6/3/2022  8:17 AM     Release to patient   Immediate             Complications: none    Findings: See dictation    Indications:    The patient is an 67 y.o. male seen for evaluation chronic renal failure in need of hemodialysis access.  Patient understands risk benefits complications of the placement of fistula and agrees to procedure       Procedure:    The patient was prepped and draped sterilely under block anesthetic and MAC.  The patient had duplex interrogation at the wrist confirming patency of the cephalic vein up onto the snuffbox.  This portion of the cephalic vein would require transposition to get down to the radiocephalic but it was felt to be the best outflow segment of vein.  We therefore incised transversely across the wrist approximately 2 cm above the snuffbox and expose the vein which was felt to be adequate and then exposed the radial artery.  3000 units of heparin was given and the vein was marked and divided and suture ligated distally.  It was dilated up with the saline infusion and then swung down transposed onto the top of the  radial artery which was controlled with angle of 8 clamps.  Longitudinal arteriotomy was made which was large and patulous on purpose to allow for good anastomosis and the vein within incised and an end on anastomosis end-to-side vein to artery with a 6-0 Prolene suture running is performed.  With flow established there was excellent flow in the fistula and good flow at the ulnar artery and into the palmar arch.  This is confirmed by Doppler.  Reversal was not pursued.  Hemostasis was assured and then closure of the deep tissue with 3-0 Vicryl followed by closure of the skin with 4-0 Vicryl subcuticular closure and skin glue was performed.  Patient tolerated the procedure well was sent to the recovery room stable          Active Hospital Problems    Diagnosis  POA   • CKD (chronic kidney disease) stage 4, GFR 15-29 ml/min (HCC) [N18.4]  Yes      Resolved Hospital Problems   No resolved problems to display.      Eliseo Willis MD     Date: 6/3/2022  Time: 11:58 EDT

## 2022-06-03 NOTE — ED NOTES
Pt to triage from home via [employer] [id number] with c/o generalized weakness and short of air x months, but worse in the last week.  Pt had dialysis fistula placed today to right arm, did not take any meds today.  Pt wearing mask in triage. Triage personnel wore appropriate PPE

## 2022-06-03 NOTE — BRIEF OP NOTE
ARTERIOVENOUS FISTULA FORMATION  Progress Note    Carlito Mo  6/3/2022    Pre-op Diagnosis:   Chronic renal failure       Post-Op Diagnosis Codes:  same    Procedure/CPT® Codes:        Procedure(s):  RIGHT FOREARM ARTERIOVENOUS FISTULA PLACEMENT RADIOCEPHALIC WITH CEPHALIC VEIN TRANSPOSITION    Surgeon(s):  Eliseo Willis MD    Anesthesia: Monitored Anesthesia Care with [facility]    Staff:   Circulator: Amanda Yoon RN  Scrub Person: Latasha Gross; Justine Nolasco PCT  Assistant: Steffany Lorenzo CST  Orientee: Abran Landers RN  Other: Marilu Bravo  Assistant: Steffany Lorenzo CST      Estimated Blood Loss: minimal    Urine Voided: * No values recorded between 6/3/2022 10:18 AM and 6/3/2022 11:41 AM *    Specimens:                None          Drains: * No LDAs found *    Findings: see dict        Complications: none    Assistant: Steffany Lorenzo CST  was responsible for performing the following activities: Retraction, Suction, Irrigation, Suturing, Closing and Placing Dressing and their skilled assistance was necessary for the success of this case.    Eliseo Willis MD     Date: 6/3/2022  Time: 11:43 EDT

## 2022-06-03 NOTE — H&P
[facility]   HISTORY AND PHYSICAL    Patient Name:Carlito Mo  : 1955  MRN: [id number]  Primary Care Physician: Florencia Caballero MD  Date of admission: 6/3/2022    Subjective   Subjective     Chief Complaint: End-stage renal failure    History of Present Illness   Carlito Mo is a 67 y.o. male with end-stage renal failure needing AV access.  Patient has access at the mid arm and possibly at the wrist.  Exploration of the radial and site is been to be made over the brachial and we will try to place one or the other is at mid arm fistula or a primary radiocephalic.    Review of Systems      Personal History     Past Medical History:   Diagnosis Date   • Acquired hypothyroidism    • Acute congestive heart failure (HCC)    • Acute respiratory failure with hypoxia ([facility])    • Acute sinusitis    • SYLVAIN (acute kidney injury) ([facility])     on CKD   • Anemia    • Arthritis    • CAD (coronary artery disease)    • Cancer ([facility])     skin   • Chronic combined systolic and diastolic congestive heart failure ([facility])    • Chronic ischemic heart disease    • Chronic kidney disease (CKD)    • CKD (chronic kidney disease)    • COPD (chronic obstructive pulmonary disease) ([facility])    • Depression    • Diabetes mellitus type 2, uncontrolled, without complications    • Diabetic neuropathy ([facility])    • Disease of thyroid gland    • Elevated cholesterol    • Fatigue    • Frequent PVCs    • Generalized weakness    • GERD (gastroesophageal reflux disease)    • Heart attack ([facility])    • History of coronary artery disease     with remote history of bypass surgery in    • Hyperlipidemia    • Hypertension    • Hypertensive encephalopathy    • Mental status change     Acute   • NO DEVICE/RECALLED    • Pleural effusion    • Pneumonia    • Poor historian    • RBBB (right bundle branch block)    • Stroke ([facility])     POOR VISION   • TIA (transient ischemic attack)    • Type 2 diabetes mellitus ([facility])    • Urinary retention    • Vitamin D deficiency         Past Surgical History:   Procedure Laterality Date   • APPENDECTOMY N/A 02/14/2016    Dr. Alexey Dodson   • COLONOSCOPY      over 20 years ago.  no polyps    • COLONOSCOPY N/A 9/18/2018    Procedure: COLONOSCOPY;  Surgeon: Jose Enrique Louie MD;  Location: [facility];  Service: Gastroenterology   • COLONOSCOPY N/A 9/19/2018    IH, EH, polyps (TAs w/low grade dysplasia)   • COLONOSCOPY N/A 6/1/2020    Procedure: COLONOSCOPY;  Surgeon: Jose Enrique Louie MD;  Location: [facility];  Service: Gastroenterology;  Laterality: N/A;  Pre op-Anemia, Melena, History of Polyps  Post op-To Cecum/TI, Polyp, Poor Prep   • CORONARY ARTERY BYPASS GRAFT  11/2001   • ENDOSCOPY N/A 5/29/2020    Procedure: ESOPHAGOGASTRODUODENOSCOPY with biopsies;  Surgeon: Amanda Lovelace MD;  Location: [facility];  Service: Gastroenterology;  Laterality: N/A;  pre-anemia, dark stools  post-esophagitis, hiatal hernia   • ENDOSCOPY N/A 9/15/2020    Procedure: ESOPHAGOGASTRODUODENOSCOPY WITH BIOPSIES;  Surgeon: Jose Enrique Louie MD;  Location: [facility];  Service: Gastroenterology;  Laterality: N/A;  pre: history of melena and esophagitis  post: mild esophagitis and gastritis, small hiatal hernia   • HERNIA REPAIR Left 12/05/2019   • TONSILLECTOMY     • VASECTOMY         Family History: His family history includes Alcohol abuse in his brother and father; Autism in his son; Cancer in his father; Cirrhosis in his brother; Diabetes in his mother and son; Heart disease in his father; Hypertension in his mother; Kidney failure in his son; Liver cancer (age of onset: 45) in his brother; Macular degeneration in his mother.     Social History: He  reports that he has never smoked. He has never used smokeless tobacco. He reports that he does not drink alcohol and does not use drugs.    Home Medications:  BASAGLAR KWIKPEN, Chlorhexidine Gluconate, apixaban, aspirin, buPROPion XL, carvedilol, insulin glulisine,  levothyroxine, tamsulosin, torsemide, and vitamin D3    Allergies:  He is allergic to prozac [fluoxetine hcl].    Objective    Objective     Vitals:    Temp:  [98.2 °F (36.8 °C)] 98.2 °F (36.8 °C)  Heart Rate:  [63] 63  Resp:  [16] 16  BP: (180)/(83) 180/83    Physical Exam   Lungs clear and equal  Regular rate and rhythm  Abdomen benign  Extremities viable    Result Review    Result Review:  I have personally reviewed the results from the time of this admission to 6/3/2022 09:05 EDT and agree with these findings:  []  Laboratory  []  Microbiology  []  Radiology  []  EKG/Telemetry   []  Cardiology/Vascular   []  Pathology  []  Old records  []  Other:  Most notable findings include: Vein mapping    Assessment & Plan   Assessment / Plan     Brief Patient Summary:  Carlito Mo is a 67 y.o. male end-stage renal failure needing long-term access.  Plans for access today initiation.      Active Hospital Problems:  There are no active hospital problems to display for this patient.    Plan:   Right radiocephalic AV fistula versus mid arm AV fistula    DVT prophylaxis:  Intraoperative heparin    Eliseo Wilils MD

## 2022-06-03 NOTE — CASE MANAGEMENT/SOCIAL WORK
Continued Stay Note  [facility]     Patient Name: Carlito Mo  MRN: [id number]  Today's Date: 6/3/2022    Admit Date: 6/3/2022     Discharge Plan     Row Name 06/03/22 1358       Plan    Plan Home    Plan Comments [facility] made outbound call to patient's wife Sofiya Mo ([phone]) @ 1:45pm regarding [employer] referral and support system at home, no answer. CCP left VM awaiting a return call.    Row Name 06/03/22 1120       Plan    Plan Home    Plan Comments CCP received inbound call from manager Maryanne regarding referral to case management by patient's nurse Arlene Franks regarding concern patient expressed regarding being behind on his mortgage. [facility] made outbound call to patient's nurse Heather who stated patient was in surgery but his wife was in the wating room and she was wearing a pink shirt. CCP went to the waiting room but was unsuccessful at locating patient's wife. CCP returned to the office and call patient's nurse Heather back to obtain patient's MRN nubmer and name/number for patient's wife. [facility] made outbound call to patient's wife Neal ([phone]), she answered, CCP introduced self and explained role. Neal verbalized that they were 2 months behind on their mortgage, for May and June. [facility] received verbal permission via telephone from Neal to make referral to [employer] for some financial assistance for patient. CCP made outbound call to GISELLE Veronica for referral to be made to [employer]. [facility] received inbound call from GISELLE Veronica stating referral to [employer] has been made for patient. CCP to follow up with patient's wife Sofiya regarding referral being made to [employer] as well as any other support systems the family has in place or made need assistance with getting in place.               Discharge Codes    No documentation.               Expected Discharge Date and Time     Expected Discharge Date Expected Discharge Time    Yuniel 3, 2022

## 2022-06-03 NOTE — ANESTHESIA PROCEDURE NOTES
Peripheral Block      Patient reassessed immediately prior to procedure    Patient location during procedure: pre-op  Reason for block: at surgeon's request and post-op pain management  Performed by  Anesthesiologist: Felipe Graves MD  Preanesthetic Checklist  Completed: patient identified, IV checked, site marked, risks and benefits discussed, surgical consent, monitors and equipment checked, pre-op evaluation and timeout performed  Prep:  Pt Position: supine  Sterile barriers:cap, gloves and mask  Prep: ChloraPrep  Patient monitoring: blood pressure monitoring, continuous pulse oximetry and EKG  Procedure    Sedation: yes  Performed under: local infiltration  Guidance:ultrasound guided    ULTRASOUND INTERPRETATION. Using ultrasound guidance a 22 G gauge needle was placed in close proximity to the nerve, at which point, under ultrasound guidance anesthetic was injected in the area of the nerve and spread of the anesthesia was seen on ultrasound in close proximity thereto.  There were no abnormalities seen on ultrasound; a digital image was taken; and the patient tolerated the procedure with no complications. Images:still images obtained    Block Type:supraclavicular  Injection Technique:single-shot  Needle Type:echogenic  Needle Gauge:22 G      Medications Used: ropivacaine (NAROPIN) 0.5 % injection, 20 mL  Med administered at 6/3/2022 10:15 AM      Post Assessment  Injection Assessment: negative aspiration for heme, no paresthesia on injection and incremental injection  Patient Tolerance:comfortable throughout block  Complications:no  Additional Notes  Ultrasound guidance was used for needle placement and verify medication disbursement.

## 2022-06-04 LAB
ALBUMIN SERPL-MCNC: 3 G/DL (ref 3.5–5.2)
ALBUMIN/GLOB SERPL: 1 G/DL
ALP SERPL-CCNC: 79 U/L (ref 39–117)
ALT SERPL W P-5'-P-CCNC: 8 U/L (ref 1–41)
ANION GAP SERPL CALCULATED.3IONS-SCNC: 9.9 MMOL/L (ref 5–15)
AST SERPL-CCNC: 12 U/L (ref 1–40)
BILIRUB SERPL-MCNC: 0.2 MG/DL (ref 0–1.2)
BUN SERPL-MCNC: 34 MG/DL (ref 8–23)
BUN/CREAT SERPL: 14.8 (ref 7–25)
CALCIUM SPEC-SCNC: 8.6 MG/DL (ref 8.6–10.5)
CHLORIDE SERPL-SCNC: 110 MMOL/L (ref 98–107)
CO2 SERPL-SCNC: 20.1 MMOL/L (ref 22–29)
CREAT SERPL-MCNC: 2.29 MG/DL (ref 0.76–1.27)
DEPRECATED RDW RBC AUTO: 40.4 FL (ref 37–54)
EGFRCR SERPLBLD CKD-EPI 2021: 30.5 ML/MIN/1.73
ERYTHROCYTE [DISTWIDTH] IN BLOOD BY AUTOMATED COUNT: 12.9 % (ref 12.3–15.4)
GLOBULIN UR ELPH-MCNC: 2.9 GM/DL
GLUCOSE BLDC GLUCOMTR-MCNC: 108 MG/DL (ref 70–130)
GLUCOSE BLDC GLUCOMTR-MCNC: 155 MG/DL (ref 70–130)
GLUCOSE BLDC GLUCOMTR-MCNC: 229 MG/DL (ref 70–130)
GLUCOSE BLDC GLUCOMTR-MCNC: 80 MG/DL (ref 70–130)
GLUCOSE SERPL-MCNC: 101 MG/DL (ref 65–99)
HCT VFR BLD AUTO: 27.5 % (ref 37.5–51)
HGB BLD-MCNC: 8.8 G/DL (ref 13–17.7)
MCH RBC QN AUTO: 27.8 PG (ref 26.6–33)
MCHC RBC AUTO-ENTMCNC: 32 G/DL (ref 31.5–35.7)
MCV RBC AUTO: 87 FL (ref 79–97)
NT-PROBNP SERPL-MCNC: 6133 PG/ML (ref 0–900)
PLATELET # BLD AUTO: 260 10*3/MM3 (ref 140–450)
PMV BLD AUTO: 9.9 FL (ref 6–12)
POTASSIUM SERPL-SCNC: 4.3 MMOL/L (ref 3.5–5.2)
PROT SERPL-MCNC: 5.9 G/DL (ref 6–8.5)
QT INTERVAL: 482 MS
RBC # BLD AUTO: 3.16 10*6/MM3 (ref 4.14–5.8)
SARS-COV-2 RNA PNL SPEC NAA+PROBE: NOT DETECTED
SODIUM SERPL-SCNC: 140 MMOL/L (ref 136–145)
WBC NRBC COR # BLD: 6.79 10*3/MM3 (ref 3.4–10.8)

## 2022-06-04 PROCEDURE — 63710000001 INSULIN LISPRO (HUMAN) PER 5 UNITS: Performed by: HOSPITALIST

## 2022-06-04 PROCEDURE — 83880 ASSAY OF NATRIURETIC PEPTIDE: CPT | Performed by: HOSPITALIST

## 2022-06-04 PROCEDURE — 82962 GLUCOSE BLOOD TEST: CPT

## 2022-06-04 PROCEDURE — 87635 SARS-COV-2 COVID-19 AMP PRB: CPT | Performed by: EMERGENCY MEDICINE

## 2022-06-04 PROCEDURE — 85027 COMPLETE CBC AUTOMATED: CPT | Performed by: HOSPITALIST

## 2022-06-04 PROCEDURE — 80053 COMPREHEN METABOLIC PANEL: CPT | Performed by: HOSPITALIST

## 2022-06-04 RX ORDER — CARVEDILOL 3.12 MG/1
3.12 TABLET ORAL 2 TIMES DAILY WITH MEALS
Status: DISCONTINUED | OUTPATIENT
Start: 2022-06-04 | End: 2022-06-06 | Stop reason: HOSPADM

## 2022-06-04 RX ORDER — INSULIN GLARGINE 100 [IU]/ML
4 INJECTION, SOLUTION SUBCUTANEOUS NIGHTLY
Status: DISCONTINUED | OUTPATIENT
Start: 2022-06-04 | End: 2022-06-04

## 2022-06-04 RX ORDER — ASPIRIN 81 MG/1
81 TABLET ORAL DAILY
Status: DISCONTINUED | OUTPATIENT
Start: 2022-06-04 | End: 2022-06-06 | Stop reason: HOSPADM

## 2022-06-04 RX ORDER — HYDRALAZINE HYDROCHLORIDE 20 MG/ML
10 INJECTION INTRAMUSCULAR; INTRAVENOUS EVERY 4 HOURS PRN
Status: DISCONTINUED | OUTPATIENT
Start: 2022-06-04 | End: 2022-06-04

## 2022-06-04 RX ORDER — TAMSULOSIN HYDROCHLORIDE 0.4 MG/1
0.4 CAPSULE ORAL NIGHTLY
Status: DISCONTINUED | OUTPATIENT
Start: 2022-06-04 | End: 2022-06-06 | Stop reason: HOSPADM

## 2022-06-04 RX ORDER — INSULIN LISPRO 100 [IU]/ML
0-7 INJECTION, SOLUTION INTRAVENOUS; SUBCUTANEOUS
Status: DISCONTINUED | OUTPATIENT
Start: 2022-06-04 | End: 2022-06-06 | Stop reason: HOSPADM

## 2022-06-04 RX ORDER — AMLODIPINE BESYLATE 10 MG/1
10 TABLET ORAL
Status: DISCONTINUED | OUTPATIENT
Start: 2022-06-04 | End: 2022-06-06 | Stop reason: HOSPADM

## 2022-06-04 RX ORDER — INSULIN LISPRO 100 [IU]/ML
0-7 INJECTION, SOLUTION INTRAVENOUS; SUBCUTANEOUS
Status: DISCONTINUED | OUTPATIENT
Start: 2022-06-04 | End: 2022-06-04

## 2022-06-04 RX ORDER — PANTOPRAZOLE SODIUM 40 MG/1
40 TABLET, DELAYED RELEASE ORAL
Status: DISCONTINUED | OUTPATIENT
Start: 2022-06-05 | End: 2022-06-06 | Stop reason: HOSPADM

## 2022-06-04 RX ORDER — HYDROCODONE BITARTRATE AND ACETAMINOPHEN 7.5; 325 MG/1; MG/1
1 TABLET ORAL EVERY 4 HOURS PRN
Status: DISCONTINUED | OUTPATIENT
Start: 2022-06-04 | End: 2022-06-06

## 2022-06-04 RX ORDER — LEVOTHYROXINE SODIUM 0.07 MG/1
75 TABLET ORAL DAILY
Status: DISCONTINUED | OUTPATIENT
Start: 2022-06-04 | End: 2022-06-06 | Stop reason: HOSPADM

## 2022-06-04 RX ADMIN — LEVOTHYROXINE SODIUM 75 MCG: 0.07 TABLET ORAL at 12:17

## 2022-06-04 RX ADMIN — APIXABAN 2.5 MG: 2.5 TABLET, FILM COATED ORAL at 08:49

## 2022-06-04 RX ADMIN — TAMSULOSIN HYDROCHLORIDE 0.4 MG: 0.4 CAPSULE ORAL at 20:29

## 2022-06-04 RX ADMIN — INSULIN LISPRO 2 UNITS: 100 INJECTION, SOLUTION INTRAVENOUS; SUBCUTANEOUS at 12:17

## 2022-06-04 RX ADMIN — ASPIRIN 81 MG: 81 TABLET, COATED ORAL at 12:17

## 2022-06-04 RX ADMIN — Medication 10 ML: at 08:49

## 2022-06-04 RX ADMIN — CARVEDILOL 3.12 MG: 3.12 TABLET, FILM COATED ORAL at 08:49

## 2022-06-04 RX ADMIN — INSULIN LISPRO 3 UNITS: 100 INJECTION, SOLUTION INTRAVENOUS; SUBCUTANEOUS at 17:48

## 2022-06-04 RX ADMIN — AMLODIPINE BESYLATE 10 MG: 10 TABLET ORAL at 08:49

## 2022-06-04 RX ADMIN — CARVEDILOL 3.12 MG: 3.12 TABLET, FILM COATED ORAL at 17:48

## 2022-06-04 RX ADMIN — APIXABAN 2.5 MG: 2.5 TABLET, FILM COATED ORAL at 20:29

## 2022-06-04 NOTE — CONSULTS
[facility] Consult Note      Patient Name: Carlito Mo  : 1955  MRN: [id number]  Primary Care Physician:  Florencia Caballero MD  Referring Physician: Josue Pickens MD  Date of admission: 6/3/2022    Subjective     Reason for Consult: CKD stage IV    HPI:   Carlito Mo is a 67 y.o. male with underlying CKD stage IV with baseline creatinine around 2.3 to 2.6 mg/dL, proteinuric in nature, likely related to longstanding diabetic nephropathy.  He follows with Dr. Bryan Huddleston in our CKD clinic.  He has underlying chronic systolic congestive heart failure with an EF around 45 to 50%  He was referred to vascular surgery for preemptive placement of a permanent AV access for potential need for dialysis down the road.  He underwent placement of a right radiocephalic AV fistula by Dr. Willis  He returned feeling weak and rundown, basically having generalized weakness  He otherwise has no focalizing signs or symptoms    Review of Systems:   14 point review of systems is otherwise negative except for mentioned above on HPI    Personal History     Past Medical History:   Diagnosis Date   • Acquired hypothyroidism    • Acute congestive heart failure (HCC)    • Acute respiratory failure with hypoxia (HCC)    • Acute sinusitis    • SYLVAIN (acute kidney injury) (HCC)     on CKD   • Anemia    • Arthritis    • CAD (coronary artery disease)    • Cancer (HCC)     skin   • Chronic combined systolic and diastolic congestive heart failure (HCC)    • Chronic ischemic heart disease    • Chronic kidney disease (CKD)    • CKD (chronic kidney disease)    • COPD (chronic obstructive pulmonary disease) (HCC)    • Depression    • Diabetes mellitus type 2, uncontrolled, without complications    • Diabetic neuropathy (HCC)    • Disease of thyroid gland    • Elevated cholesterol    • Fatigue    • Frequent PVCs    • Generalized weakness    • GERD (gastroesophageal reflux disease)    • Heart attack (HCC)    • History of  coronary artery disease     with remote history of bypass surgery in 2001   • Hyperlipidemia    • Hypertension    • Hypertensive encephalopathy    • Mental status change     Acute   • NO DEVICE/RECALLED    • Pleural effusion    • Pneumonia    • Poor historian    • RBBB (right bundle branch block)    • Stroke (HCC)     POOR VISION   • TIA (transient ischemic attack)    • Type 2 diabetes mellitus (HCC)    • Urinary retention    • Vitamin D deficiency        Past Surgical History:   Procedure Laterality Date   • APPENDECTOMY N/A 02/14/2016    Dr. Alexey Dodson   • COLONOSCOPY      over 20 years ago.  no polyps    • COLONOSCOPY N/A 9/18/2018    Procedure: COLONOSCOPY;  Surgeon: Jose Enrique Louie MD;  Location: [facility] ENDOSCOPY;  Service: Gastroenterology   • COLONOSCOPY N/A 9/19/2018    IH, EH, polyps (TAs w/low grade dysplasia)   • COLONOSCOPY N/A 6/1/2020    Procedure: COLONOSCOPY;  Surgeon: Jose Enrique Louie MD;  Location: [facility] ENDOSCOPY;  Service: Gastroenterology;  Laterality: N/A;  Pre op-Anemia, Melena, History of Polyps  Post op-To Cecum/TI, Polyp, Poor Prep   • CORONARY ARTERY BYPASS GRAFT  11/2001   • ENDOSCOPY N/A 5/29/2020    Procedure: ESOPHAGOGASTRODUODENOSCOPY with biopsies;  Surgeon: Amanda Lovelace MD;  Location: [facility] ENDOSCOPY;  Service: Gastroenterology;  Laterality: N/A;  pre-anemia, dark stools  post-esophagitis, hiatal hernia   • ENDOSCOPY N/A 9/15/2020    Procedure: ESOPHAGOGASTRODUODENOSCOPY WITH BIOPSIES;  Surgeon: Jose Enrique Louie MD;  Location: [facility] ENDOSCOPY;  Service: Gastroenterology;  Laterality: N/A;  pre: history of melena and esophagitis  post: mild esophagitis and gastritis, small hiatal hernia   • HERNIA REPAIR Left 12/05/2019   • TONSILLECTOMY     • VASECTOMY         Family History: family history includes Alcohol abuse in his brother and father; Autism in his son; Cancer in his father; Cirrhosis in his brother; Diabetes in his mother and son; Heart disease in  his father; Hypertension in his mother; Kidney failure in his son; Liver cancer (age of onset: 45) in his brother; Macular degeneration in his mother.    Social History:  reports that he has never smoked. He has never used smokeless tobacco. He reports that he does not drink alcohol and does not use drugs.    Home Medications:  Prior to Admission medications    Medication Sig Start Date End Date Taking? Authorizing Provider   apixaban (ELIQUIS) 2.5 MG tablet tablet Take 1 tablet by mouth Every 12 (Twelve) Hours. Indications: Other - full anticoagulation  Patient taking differently: Take 2.5 mg by mouth Every 12 (Twelve) Hours. INSTR TO FOLLOW MD GUIDELINES ON WHEN/IF TO HOLD DOS  Indications: Other - full anticoagulation 12/22/21  Yes Flo Sherman MD   aspirin 81 MG EC tablet Take 81 mg by mouth Daily. INSTR TO FOLLOW MD GUIDELINE ON WHEN/IF TO HOLD DAY OF SURGERY   Yes ProviderAlonzo MD   Cholecalciferol (VITAMIN D3) 5000 units capsule capsule Take 5,000 Units by mouth Daily.   Yes Alonzo Dean MD   Insulin Glargine (BASAGLAR KWIKPEN) 100 UNIT/ML injection pen Inject 4 Units under the skin into the appropriate area as directed Every Night. Patient says he does not take consistently  Patient taking differently: Inject 4 Units under the skin into the appropriate area as directed Every Night. 2/8/22  Yes Erica Welsh MD   insulin glulisine (Apidra) 100 UNIT/ML injection Inject 2 Units under the skin into the appropriate area as directed 3 (Three) Times a Day Before Meals. Patient says he does not take consistently  Patient taking differently: Inject 2 Units under the skin into the appropriate area as directed 3 (Three) Times a Day Before Meals. HAS NOT STARTED TAKING YET, WAITING FOR SUPPLIES 2/8/22  Yes Erica Welsh MD   levothyroxine (SYNTHROID, LEVOTHROID) 75 MCG tablet Take 1 tablet by mouth Daily. 2/8/22  Yes Erica Welsh MD   tamsulosin (FLOMAX) 0.4 MG  capsule 24 hr capsule Take 1 capsule by mouth Every Night.   Yes Provider, MD Alonzo   buPROPion XL (WELLBUTRIN XL) 300 MG 24 hr tablet Take 1 tablet by mouth Every Morning for 30 days. 3/9/22 4/8/22  Josue Pickens MD   carvedilol (COREG) 3.125 MG tablet Take 1 tablet by mouth 2 (Two) Times a Day With Meals for 30 days. 2/4/22 3/6/22  Josue Pickens MD   CHLORHEXIDINE GLUCONATE CLOTH EX Apply  topically. USE AS DIRECTED    Provider, MD Alonzo   HYDROcodone-acetaminophen (NORCO) 5-325 MG per tablet Take 1-2 tablets by mouth Every 6 (Six) Hours As Needed (Pain). 6/3/22   Eliseo Willis MD   torsemide (DEMADEX) 20 MG tablet Take 2 tablets by mouth Daily for 30 days. 3/9/22 4/8/22  Josue Pickens MD       Allergies:  Allergies   Allergen Reactions   • Prozac [Fluoxetine Hcl] Other (See Comments)     shaking       Objective     Vitals:   Temp:  [96.3 °F (35.7 °C)-97.9 °F (36.6 °C)] 97.2 °F (36.2 °C)  Heart Rate:  [65-89] 67  Resp:  [16-18] 16  BP: (147-201)/() 147/83  No intake or output data in the 24 hours ending 06/04/22 1430    Physical Exam:    General Appearance: NAD  HEENT: oral mucosa normal, nonicteric sclera  Neck: supple, no JVD  Lungs: CTA  Heart: RRR, normal S1 and S2  Abdomen: soft, nondistended  Extremities: no edema  Neuro: Awake and alert and moving all extremities    Scheduled Meds:     amLODIPine, 10 mg, Oral, Q24H  apixaban, 2.5 mg, Oral, Q12H  aspirin, 81 mg, Oral, Daily  carvedilol, 3.125 mg, Oral, BID With Meals  insulin lispro, 0-7 Units, Subcutaneous, 4x Daily With Meals & Nightly  levothyroxine, 75 mcg, Oral, Daily  [START ON 6/5/2022] pantoprazole, 40 mg, Oral, Q AM  tamsulosin, 0.4 mg, Oral, Nightly      IV Meds:        Results Reviewed:   I have personally reviewed the results from the time of this admission to 6/4/2022 14:30 EDT     Lab Results   Component Value Date    GLUCOSE 101 (H) 06/04/2022    CALCIUM 8.6 06/04/2022     06/04/2022    K 4.3 06/04/2022    CO2 20.1  (L) 06/04/2022     (H) 06/04/2022    BUN 34 (H) 06/04/2022    CREATININE 2.29 (H) 06/04/2022    EGFRIFAFRI 74 12/12/2017    EGFRIFNONA 19 (L) 02/04/2022    BCR 14.8 06/04/2022    ANIONGAP 9.9 06/04/2022      Lab Results   Component Value Date    MG 2.2 06/03/2022    PHOS 3.9 03/08/2022    ALBUMIN 3.00 (L) 06/04/2022           Assessment / Plan     ASSESSMENT:  -CKD stage IV, proteinuric in nature, with baseline creatinine around 2.3 to 2.6 mg/dL, likely related to longstanding diabetic nephropathy, stable nature at this time  -Status post right radiocephalic AV fistula by Dr. Willis on 6/3 without immediate complications  -Hypertension  -Type 2 diabetes  -BPH  -Generalized weakness    PLAN:  -His generalized weakness could possibly be related to anesthetics not completely washing out of his system due to suboptimal renal clearance.  In any case, he will be evaluated by PT and OT for possible YING placement/need  -Clinically appears euvolemic, no need for IV fluids or diuretics at this time  -Surveillance labs    Thank you for the consult!    Favio Krishna MD  06/04/22  14:30 EDT    [facility]  [phone]

## 2022-06-04 NOTE — ED PROVIDER NOTES
EMERGENCY DEPARTMENT ENCOUNTER    Room Number:  P382/1  Date of encounter:  6/4/2022  PCP: Florencia Caballero MD  Historian: Patient      HPI:  Chief Complaint: Weakness, arm pain  A complete HPI/ROS/PMH/PSH/SH/FH are unobtainable due to: None    Context: Carlito Mo is a 67 y.o. male who presents to the ED c/o generalized weakness.  States that getting weekly for the past couple days.  States she is unable to care for himself at home.  States his wife is unable to care for him.  Patient also reports that yesterday he had a dialysis fistula placed earlier today.  Has not taken the pain medicine that he was prescribed.  States he has been having some arm pain.      PAST MEDICAL HISTORY  Active Ambulatory Problems     Diagnosis Date Noted   • Major depressive disorder with single episode, in partial remission (HCC)    • Other hyperlipidemia    • Vitamin D deficiency 12/17/2015   • Erectile dysfunction 12/17/2015   • Chronic fatigue 04/25/2016   • Type 2 diabetes mellitus with ophthalmic complication ([facility]) 04/25/2016   • Skin lesion of chest wall 06/20/2016   • Slow transit constipation 09/01/2016   • Benign prostatic hyperplasia with nocturia 12/20/2016   • History of basal cell carcinoma 12/20/2016   • High blood pressure associated with diabetes ([facility]) 12/20/2016   • Acquired hypothyroidism 12/20/2017   • Hypertensive urgency 02/16/2018   • Recurrent strokes ([facility]) 02/20/2018   • Urinary retention 09/20/2018   • Pneumonia 09/21/2018   • Acute on chronic combined systolic and diastolic congestive heart failure ([facility]) 09/21/2018   • Acute respiratory failure with hypoxia ([facility]) 09/21/2018   • Suspected sleep apnea 10/29/2018   • Pleural effusion 12/01/2018   • YAW (obstructive sleep apnea) 12/13/2018   • Coronary artery disease involving native coronary artery of native heart without angina pectoris 04/18/2019   • Chronically elevated troponin 07/02/2019   • Colon cancer screening 10/30/2018   • Enlarged lymph nodes  10/30/2018   • Functional gait abnormality 10/30/2018   • History of myocardial infarction 02/06/2019   • Hospital discharge follow-up 05/31/2019   • Insomnia 02/06/2019   • Iron deficiency anemia 10/02/2018   • Major depression, recurrent ([facility]) 10/16/2012   • Anemia, chronic renal failure, stage 3 (moderate) ([facility]) 02/27/2020   • Essential hypertension 03/10/2020   • Adverse effect of iron and its compounds, initial encounter 05/23/2020   • Acute on chronic renal insufficiency 05/25/2020   • Debility 05/25/2020   • Falls frequently 05/25/2020   • Acute pain of right shoulder 05/27/2020   • Acute on chronic anemia 05/25/2020   • Heme positive stool 05/25/2020   • Neurogenic orthostatic hypotension ([facility]) 05/30/2020   • Symptomatic anemia 05/25/2020   • History of colon polyps 05/25/2020   • Helicobacter pylori gastritis 06/04/2020   • Esophagitis 06/10/2020   • Sleep related hypoxia 07/23/2020   • Embolic stroke ([facility]) 05/20/2021   • Patent foramen ovale 05/22/2021   • Pleural effusion, bilateral 05/22/2021   • Cardiomyopathy ([facility]) 05/24/2021   • Lower abdominal pain 12/13/2021   • Diarrhea 12/14/2021   • CKD (chronic kidney disease) stage 4, GFR 15-29 ml/min ([facility]) 12/16/2021   • Hypertension not at goal 02/02/2022   • Memory loss due to medical condition 02/08/2022   • Generalized weakness 03/01/2022   • ERRONEOUS ENCOUNTER--DISREGARD 03/09/2022     Resolved Ambulatory Problems     Diagnosis Date Noted   • Anemia    • Arthritis    • Diabetes mellitus out of control    • Type 2 diabetes mellitus (HCC)    • Acute sinusitis    • Accumulation of fluid in tissues 12/17/2015   • Fatigue 12/17/2015   • Cardiomyopathy, ischemic 12/17/2015   • Acute appendicitis 03/02/2016   • Atherosclerosis of coronary artery 10/16/2012   • Altered mental status 11/30/2017   • Hypertensive encephalopathy syndrome 04/30/2018   • History of medication noncompliance 05/04/2018   • Hyperglycemia 05/04/2018   • Screening for colorectal cancer  08/22/2018   • Constipation 08/22/2018   • SYLVAIN (acute kidney injury) (HCC) 09/20/2018   • Snoring 12/13/2018   • Lower abdominal pain 05/27/2020   • Hypertensive emergency 02/01/2022     Past Medical History:   Diagnosis Date   • Acute congestive heart failure (HCC)    • CAD (coronary artery disease)    • Cancer (HCC)    • Chronic combined systolic and diastolic congestive heart failure (HCC)    • Chronic ischemic heart disease    • Chronic kidney disease (CKD)    • CKD (chronic kidney disease)    • COPD (chronic obstructive pulmonary disease) (HCC)    • Depression    • Diabetes mellitus type 2, uncontrolled, without complications    • Diabetic neuropathy (HCC)    • Disease of thyroid gland    • Elevated cholesterol    • Frequent PVCs    • GERD (gastroesophageal reflux disease)    • Heart attack (HCC)    • History of coronary artery disease    • Hyperlipidemia    • Hypertension    • Hypertensive encephalopathy    • Mental status change    • NO DEVICE/RECALLED    • Poor historian    • RBBB (right bundle branch block)    • Stroke (HCC)    • TIA (transient ischemic attack)          PAST SURGICAL HISTORY  Past Surgical History:   Procedure Laterality Date   • APPENDECTOMY N/A 02/14/2016    Dr. Alexey Dodson   • COLONOSCOPY      over 20 years ago.  no polyps    • COLONOSCOPY N/A 9/18/2018    Procedure: COLONOSCOPY;  Surgeon: Jose Enrique Louie MD;  Location: [facility] ENDOSCOPY;  Service: Gastroenterology   • COLONOSCOPY N/A 9/19/2018    IH, EH, polyps (TAs w/low grade dysplasia)   • COLONOSCOPY N/A 6/1/2020    Procedure: COLONOSCOPY;  Surgeon: Jose Enrique Louie MD;  Location: [facility] ENDOSCOPY;  Service: Gastroenterology;  Laterality: N/A;  Pre op-Anemia, Melena, History of Polyps  Post op-To Cecum/TI, Polyp, Poor Prep   • CORONARY ARTERY BYPASS GRAFT  11/2001   • ENDOSCOPY N/A 5/29/2020    Procedure: ESOPHAGOGASTRODUODENOSCOPY with biopsies;  Surgeon: Amanda Lovelace MD;  Location: [facility] ENDOSCOPY;  Service:  Gastroenterology;  Laterality: N/A;  pre-anemia, dark stools  post-esophagitis, hiatal hernia   • ENDOSCOPY N/A 9/15/2020    Procedure: ESOPHAGOGASTRODUODENOSCOPY WITH BIOPSIES;  Surgeon: Jose Enrique Louie MD;  Location: [facility] ENDOSCOPY;  Service: Gastroenterology;  Laterality: N/A;  pre: history of melena and esophagitis  post: mild esophagitis and gastritis, small hiatal hernia   • HERNIA REPAIR Left 2019   • TONSILLECTOMY     • VASECTOMY           FAMILY HISTORY  Family History   Problem Relation Age of Onset   • Diabetes Mother    • Hypertension Mother    • Macular degeneration Mother    • Alcohol abuse Father    • Cancer Father         lung,  age 65   • Heart disease Father    • Alcohol abuse Brother    • Cirrhosis Brother          age 50   • Liver cancer Brother 45   • Diabetes Son    • Kidney failure Son    • Autism Son    • Malig Hyperthermia Neg Hx          SOCIAL HISTORY  Social History     Socioeconomic History   • Marital status:      Spouse name: Lissette   • Number of children: 3   • Years of education: college   Tobacco Use   • Smoking status: Never Smoker   • Smokeless tobacco: Never Used   • Tobacco comment: daily caffeine   Vaping Use   • Vaping Use: Never used   Substance and Sexual Activity   • Alcohol use: No   • Drug use: No   • Sexual activity: Defer         ALLERGIES  Prozac [fluoxetine hcl]        REVIEW OF SYSTEMS  Review of Systems     All systems reviewed and negative except for those discussed in HPI.       PHYSICAL EXAM    I have reviewed the triage vital signs and nursing notes.    ED Triage Vitals   Temp Heart Rate Resp BP SpO2   22   96.3 °F (35.7 °C) 65 16 (!) 181/82 98 %      Temp src Heart Rate Source Patient Position BP Location FiO2 (%)   22 0051 --   Tympanic Monitor Lying Left arm        Physical Exam  GENERAL: not distressed  HENT:  nares patent  EYES: no scleral icterus  CV: regular rhythm, regular rate  RESPIRATORY: normal effort  ABDOMEN: soft, nontender nondistended  MUSCULOSKELETAL: no deformity right arm is well-perfused palpable thrill to fistula no obvious signs of infection  NEURO: alert, moves all extremities, follows commands  SKIN: warm, dry        LAB RESULTS  Recent Results (from the past 24 hour(s))   POC Glucose Once    Collection Time: 06/03/22  8:23 AM    Specimen: Blood   Result Value Ref Range    Glucose 156 (H) 70 - 130 mg/dL   Basic Metabolic Panel    Collection Time: 06/03/22  8:48 AM    Specimen: Blood   Result Value Ref Range    Glucose 133 (H) 65 - 99 mg/dL    BUN 38 (H) 8 - 23 mg/dL    Creatinine 2.60 (H) 0.76 - 1.27 mg/dL    Sodium 136 136 - 145 mmol/L    Potassium 4.2 3.5 - 5.2 mmol/L    Chloride 108 (H) 98 - 107 mmol/L    CO2 20.5 (L) 22.0 - 29.0 mmol/L    Calcium 8.7 8.6 - 10.5 mg/dL    BUN/Creatinine Ratio 14.6 7.0 - 25.0    Anion Gap 7.5 5.0 - 15.0 mmol/L    eGFR 26.2 (L) >60.0 mL/min/1.73   ECG 12 Lead    Collection Time: 06/03/22  7:57 PM   Result Value Ref Range    QT Interval 482 ms   Comprehensive Metabolic Panel    Collection Time: 06/03/22  9:30 PM    Specimen: Blood   Result Value Ref Range    Glucose 144 (H) 65 - 99 mg/dL    BUN 36 (H) 8 - 23 mg/dL    Creatinine 2.61 (H) 0.76 - 1.27 mg/dL    Sodium 139 136 - 145 mmol/L    Potassium 5.0 3.5 - 5.2 mmol/L    Chloride 107 98 - 107 mmol/L    CO2 21.0 (L) 22.0 - 29.0 mmol/L    Calcium 9.3 8.6 - 10.5 mg/dL    Total Protein 7.7 6.0 - 8.5 g/dL    Albumin 4.10 3.50 - 5.20 g/dL    ALT (SGPT) 14 1 - 41 U/L    AST (SGOT) 21 1 - 40 U/L    Alkaline Phosphatase 97 39 - 117 U/L    Total Bilirubin 0.2 0.0 - 1.2 mg/dL    Globulin 3.6 gm/dL    A/G Ratio 1.1 g/dL    BUN/Creatinine Ratio 13.8 7.0 - 25.0    Anion Gap 11.0 5.0 - 15.0 mmol/L    eGFR 26.1 (L) >60.0 mL/min/1.73   Troponin    Collection Time: 06/03/22  9:30 PM    Specimen: Blood   Result Value Ref Range     Troponin T 0.100 (C) 0.000 - 0.030 ng/mL   Magnesium    Collection Time: 06/03/22  9:30 PM    Specimen: Blood   Result Value Ref Range    Magnesium 2.2 1.6 - 2.4 mg/dL   Green Top (Gel)    Collection Time: 06/03/22  9:30 PM   Result Value Ref Range    Extra Tube Hold for add-ons.    Lavender Top    Collection Time: 06/03/22  9:30 PM   Result Value Ref Range    Extra Tube hold for add-on    Light Blue Top    Collection Time: 06/03/22  9:30 PM   Result Value Ref Range    Extra Tube Hold for add-ons.    CBC Auto Differential    Collection Time: 06/03/22  9:30 PM    Specimen: Blood   Result Value Ref Range    WBC 9.98 3.40 - 10.80 10*3/mm3    RBC 3.76 (L) 4.14 - 5.80 10*6/mm3    Hemoglobin 10.4 (L) 13.0 - 17.7 g/dL    Hematocrit 31.8 (L) 37.5 - 51.0 %    MCV 84.6 79.0 - 97.0 fL    MCH 27.7 26.6 - 33.0 pg    MCHC 32.7 31.5 - 35.7 g/dL    RDW 12.9 12.3 - 15.4 %    RDW-SD 39.3 37.0 - 54.0 fl    MPV 9.7 6.0 - 12.0 fL    Platelets 340 140 - 450 10*3/mm3    Neutrophil % 81.2 (H) 42.7 - 76.0 %    Lymphocyte % 9.9 (L) 19.6 - 45.3 %    Monocyte % 6.1 5.0 - 12.0 %    Eosinophil % 2.1 0.3 - 6.2 %    Basophil % 0.3 0.0 - 1.5 %    Immature Grans % 0.4 0.0 - 0.5 %    Neutrophils, Absolute 8.10 (H) 1.70 - 7.00 10*3/mm3    Lymphocytes, Absolute 0.99 0.70 - 3.10 10*3/mm3    Monocytes, Absolute 0.61 0.10 - 0.90 10*3/mm3    Eosinophils, Absolute 0.21 0.00 - 0.40 10*3/mm3    Basophils, Absolute 0.03 0.00 - 0.20 10*3/mm3    Immature Grans, Absolute 0.04 0.00 - 0.05 10*3/mm3    nRBC 0.0 0.0 - 0.2 /100 WBC   Urinalysis With Microscopic If Indicated (No Culture) - Urine, Clean Catch    Collection Time: 06/03/22 10:34 PM    Specimen: Urine, Clean Catch   Result Value Ref Range    Color, UA Yellow Yellow, Straw    Appearance, UA Clear Clear    pH, UA <=5.0 5.0 - 8.0    Specific Gravity, UA 1.020 1.005 - 1.030    Glucose, UA Negative Negative    Ketones, UA Negative Negative    Bilirubin, UA Negative Negative    Blood, UA Small (1+) (A) Negative     Protein, UA >=300 mg/dL (3+) (A) Negative    Leuk Esterase, UA Negative Negative    Nitrite, UA Negative Negative    Urobilinogen, UA 0.2 E.U./dL 0.2 - 1.0 E.U./dL   Urinalysis, Microscopic Only - Urine, Clean Catch    Collection Time: 06/03/22 10:34 PM    Specimen: Urine, Clean Catch   Result Value Ref Range    RBC, UA 0-2 None Seen, 0-2 /HPF    WBC, UA None Seen None Seen, 0-2 /HPF    Bacteria, UA None Seen None Seen /HPF    Squamous Epithelial Cells, UA 0-2 None Seen, 0-2 /HPF    Hyaline Casts, UA None Seen None Seen /LPF    Methodology Manual Light Microscopy    COVID-19,[facility]HOUSE CEPHEID/ERROL NP SWAB IN TRANSPORT MEDIA 8-12 HR TAT - Swab, Nasopharynx    Collection Time: 06/04/22 12:00 AM    Specimen: Nasopharynx; Swab   Result Value Ref Range    COVID19 Not Detected Not Detected - Ref. Range       Ordered the above labs and independently reviewed the results.        RADIOLOGY  XR Chest 1 View    Result Date: 6/3/2022  XR CHEST 1 VW-  HISTORY: Male who is 67 years-old,  weakness  TECHNIQUE: Frontal view of the chest  COMPARISON: 06/01/2022  FINDINGS: The heart size is borderline. Pulmonary vasculature is unremarkable. Sternotomy wires are present. Aorta is calcified. No focal pulmonary consolidation, pleural effusion, or pneumothorax. No acute osseous process.      No focal pulmonary consolidation. Follow-up as clinical indications persist.  This report was finalized on 6/3/2022 9:02 PM by Dr. Drake Dennis M.D.      Peripheral Block    Result Date: 6/3/2022  Felipe Graves MD     6/3/2022 10:42 AM Peripheral Block Patient reassessed immediately prior to procedure Patient location during procedure: pre-op Reason for block: at surgeon's request and post-op pain management Performed by Anesthesiologist: Felipe Graves MD Preanesthetic Checklist Completed: patient identified, IV checked, site marked, risks and benefits discussed, surgical consent, monitors and equipment checked, pre-op  evaluation and timeout performed Prep: Pt Position: supine Sterile barriers:cap, gloves and mask Prep: ChloraPrep Patient monitoring: blood pressure monitoring, continuous pulse oximetry and EKG Procedure Sedation: yes Performed under: local infiltration Guidance:ultrasound guided ULTRASOUND INTERPRETATION. Using ultrasound guidance a 22 G gauge needle was placed in close proximity to the nerve, at which point, under ultrasound guidance anesthetic was injected in the area of the nerve and spread of the anesthesia was seen on ultrasound in close proximity thereto.  There were no abnormalities seen on ultrasound; a digital image was taken; and the patient tolerated the procedure with no complications. Images:still images obtained Block Type:supraclavicular Injection Technique:single-shot Needle Type:echogenic Needle Gauge:22 G Medications Used: ropivacaine (NAROPIN) 0.5 % injection, 20 mL Med administered at 6/3/2022 10:15 AM Post Assessment Injection Assessment: negative aspiration for heme, no paresthesia on injection and incremental injection Patient Tolerance:comfortable throughout block Complications:no Additional Notes Ultrasound guidance was used for needle placement and verify medication disbursement.       I ordered the above noted radiological studies. Reviewed by me and discussed with radiologist.  See dictation for official radiology interpretation.      PROCEDURES    Procedures      MEDICATIONS GIVEN IN ER    Medications   sodium chloride 0.9 % flush 10 mL (has no administration in time range)   amLODIPine (NORVASC) tablet 10 mg (has no administration in time range)   carvedilol (COREG) tablet 3.125 mg (has no administration in time range)   apixaban (ELIQUIS) tablet 2.5 mg (has no administration in time range)   hydrALAZINE (APRESOLINE) injection 10 mg (has no administration in time range)   HYDROcodone-acetaminophen (NORCO) 7.5-325 MG per tablet 1 tablet (1 tablet Oral Given 6/3/22 5891)          PROGRESS, DATA ANALYSIS, CONSULTS, AND MEDICAL DECISION MAKING    All labs have been independently reviewed by me.  All radiology studies have been reviewed by me and discussed with radiologist dictating the report.   EKG's independently viewed and interpreted by me.  Discussion below represents my analysis of pertinent findings related to patient's condition, differential diagnosis, treatment plan and final disposition.        ED Course as of 06/04/22 0314 Fri Jun 03, 2022 2337 Patient states he is unable to ambulate or climb steps to his bed.  States been getting worse in the last couple days.  Would like to be admitted for assessment by PT and possible rehab placement. [TJ]   5628 EKG          EKG time: 1957  Rhythm/Rate: Sinus rhythm rate 64  P waves and UT: Short UT  QRS, axis: Narrow right bundle branch block  ST and T waves: Normal    Interpreted Contemporaneously by me, independently viewed [TJ]      ED Course User Index  [TJ] Riki Rey MD           PPE: The patient wore a surgical mask throughout the entire patient encounter. I wore an N95.    AS OF 03:14 EDT VITALS:    BP - (!) 184/87  HR - 70  TEMP - 97 °F (36.1 °C) (Oral)  O2 SATS - 96%        DIAGNOSIS  Final diagnoses:   Weakness   Hypertension, unspecified type   Chronic kidney disease, unspecified CKD stage         DISPOSITION  admit           Riki Rey MD  06/04/22 0314

## 2022-06-04 NOTE — PLAN OF CARE
Problem: Fall Injury Risk  Goal: Absence of Fall and Fall-Related Injury  Outcome: Ongoing, Progressing     Problem: Skin Injury Risk Increased  Goal: Skin Health and Integrity  Outcome: Ongoing, Progressing   Goal Outcome Evaluation:           Progress: no change  Outcome Evaluation: Pt admitted for weakness, soa, inability to care for himself.  Pt is alert and oriented x4, coop with care, up to br with assist, skin intact, r wrist has new av fistula, nephrologist consulted to see today, denies pain, soa.

## 2022-06-04 NOTE — ED NOTES
"Nursing report ED to floor  Carlito Mo  67 y.o.  male    HPI :   Chief Complaint   Patient presents with   • Weakness - Generalized   • Shortness of Breath       Admitting doctor:   Josue Pickens MD    Admitting diagnosis:   The primary encounter diagnosis was Weakness. Diagnoses of Hypertension, unspecified type and Chronic kidney disease, unspecified CKD stage were also pertinent to this visit.    Code status:   Current Code Status     Date Active Code Status Order ID Comments User Context       Prior    Advance Care Planning Activity          Allergies:   Prozac [fluoxetine hcl]    Intake and Output  No intake or output data in the 24 hours ending 06/04/22 0007    Weight:   There were no vitals filed for this visit.    Most recent vitals:   Vitals:    06/03/22 1948 06/03/22 2214 06/03/22 2230 06/03/22 2232   BP: (!) 181/82  (!) 201/102 (!) 188/98   Patient Position:   Lying Standing   Pulse: 65 79 78 89   Resp: 16      Temp: 96.3 °F (35.7 °C)      TempSrc: Tympanic      SpO2: 98% 93%     Height: 182.9 cm (72\")          Active LDAs/IV Access:   Lines, Drains & Airways     Active LDAs     Name Placement date Placement time Site Days    Peripheral IV 06/04/22 0004 Left Hand 06/04/22  0004  Hand  less than 1                Labs (abnormal labs have a star):   Labs Reviewed   COMPREHENSIVE METABOLIC PANEL - Abnormal; Notable for the following components:       Result Value    Glucose 144 (*)     BUN 36 (*)     Creatinine 2.61 (*)     CO2 21.0 (*)     eGFR 26.1 (*)     All other components within normal limits    Narrative:     GFR Normal >60  Chronic Kidney Disease <60  Kidney Failure <15     TROPONIN (IN-HOUSE) - Abnormal; Notable for the following components:    Troponin T 0.100 (*)     All other components within normal limits    Narrative:     Troponin T Reference Range:  <= 0.03 ng/mL-   Negative for AMI  >0.03 ng/mL-     Abnormal for myocardial necrosis.  Clinicians would have to utilize clinical acumen, EKG, " Troponin and serial changes to determine if it is an Acute Myocardial Infarction or myocardial injury due to an underlying chronic condition.       Results may be falsely decreased if patient taking Biotin.     URINALYSIS W/ MICROSCOPIC IF INDICATED (NO CULTURE) - Abnormal; Notable for the following components:    Blood, UA Small (1+) (*)     Protein, UA >=300 mg/dL (3+) (*)     All other components within normal limits   CBC WITH AUTO DIFFERENTIAL - Abnormal; Notable for the following components:    RBC 3.76 (*)     Hemoglobin 10.4 (*)     Hematocrit 31.8 (*)     Neutrophil % 81.2 (*)     Lymphocyte % 9.9 (*)     Neutrophils, Absolute 8.10 (*)     All other components within normal limits   MAGNESIUM - Normal   COVID PRE-OP / PRE-PROCEDURE SCREENING ORDER (NO ISOLATION)    Narrative:     The following orders were created for panel order COVID PRE-OP / PRE-PROCEDURE SCREENING ORDER (NO ISOLATION) - Swab, Nasopharynx.  Procedure                               Abnormality         Status                     ---------                               -----------         ------                     COVID-19,[facility]HOU[facility]...[[id number]]                                                   Please view results for these tests on the individual orders.   COVID-19,[facility]HOU[facility] CEPHEID/ERROL, NP SWAB IN TRANSPORT MEDIA 8-12 HR TAT   URINALYSIS, MICROSCOPIC ONLY   RAINBOW DRAW    Narrative:     The following orders were created for panel order [place] Draw.  Procedure                               Abnormality         Status                     ---------                               -----------         ------                     Green Top (Gel)[[id number]]                                  Final result               Lavender Top[[id number]]                                     Final result               Gold Top - SST[[id number]]                                                              Light Blue Top[[id number]]                                    Final result                 Please view results for these tests on the individual orders.   POCT GLUCOSE FINGERSTICK   CBC AND DIFFERENTIAL    Narrative:     The following orders were created for panel order CBC & Differential.  Procedure                               Abnormality         Status                     ---------                               -----------         ------                     CBC Auto Differential[[id number]]        Abnormal            Final result                 Please view results for these tests on the individual orders.   GREEN TOP   LAVENDER TOP   LIGHT BLUE TOP   GOLD TOP - SST       EKG:   ECG 12 Lead   Preliminary Result   HEART RATE= 64  bpm   RR Interval= 936  ms   UT Interval= 93  ms   P Horizontal Axis= 78  deg   P Front Axis= 21  deg   QRSD Interval= 144  ms   QT Interval= 482  ms   QRS Axis= -82  deg   T Wave Axis= 99  deg   - ABNORMAL ECG -   Sinus rhythm   Short UT interval   Right bundle branch block   Anterior infarct, old   Electronically Signed By:    Date and Time of Study: 2022-06-03 19:57:22          Meds given in ED:   Medications   sodium chloride 0.9 % flush 10 mL (has no administration in time range)   HYDROcodone-acetaminophen (NORCO) 7.5-325 MG per tablet 1 tablet (1 tablet Oral Given 6/3/22 2233)       Imaging results:  XR Chest 1 View    Result Date: 6/3/2022  No focal pulmonary consolidation. Follow-up as clinical indications persist.  This report was finalized on 6/3/2022 9:02 PM by Dr. Drake Dennis M.D.        Ambulatory status:   - assist x1 to stand    Social issues:   Social History     Socioeconomic History   • Marital status:      Spouse name: Lissette   • Number of children: 3   • Years of education: college   Tobacco Use   • Smoking status: Never Smoker   • Smokeless tobacco: Never Used   • Tobacco comment: daily caffeine   Vaping Use   • Vaping Use: Never used   Substance and Sexual Activity   • Alcohol use: No   • Drug use: No    • Sexual activity: Defer       NIH Stroke Scale:        Nursing report ED to floor:

## 2022-06-04 NOTE — H&P
History and physical    Primary care physician  Dr. GREER    Chief complaint  Generalized weakness    History of present illness  67-year-old white male with history of chronic kidney disease stage IV diabetes mellitus hypertension hyperlipidemia and gastroesophageal reflux disease who is well-known to our service presented to [facility], [place], operated by [facility] emergency room with generalized weakness.  Patient has had dialysis fistula placed yesterday to start hemodialysis in the near future.  Patient stated that he cannot take care of himself.  Patient evaluated in ER admitted for PT OT and possible subacute rehab placement.    PAST MEDICAL HISTORY  • Acute congestive heart failure (HCC)     • CAD (coronary artery disease)     • Cancer (HCC)     • Chronic combined systolic and diastolic congestive heart failure (HCC)     • Chronic ischemic heart disease     • Chronic kidney disease (CKD)     • CKD (chronic kidney disease)     • COPD (chronic obstructive pulmonary disease) (HCC)     • Depression     • Diabetes mellitus type 2, uncontrolled, without complications     • Diabetic neuropathy (HCC)     • Disease of thyroid gland     • Elevated cholesterol     • Frequent PVCs     • GERD (gastroesophageal reflux disease)     • Heart attack (HCC)     • History of coronary artery disease     • Hyperlipidemia     • Hypertension     • Hypertensive encephalopathy     • Mental status change     • NO DEVICE/RECALLED     • Poor historian     • RBBB (right bundle branch block)     • Stroke (HCC)     • TIA (transient ischemic attack)       PAST SURGICAL HISTORY              Procedure Laterality Date   • APPENDECTOMY N/A 02/14/2016     Dr. Alexey Dodson   • COLONOSCOPY         over 20 years ago.  no polyps    • COLONOSCOPY N/A 9/18/2018     Procedure: COLONOSCOPY;  Surgeon: Jose Enrique Louie MD;  Location: [facility] ENDOSCOPY;  Service: Gastroenterology   • COLONOSCOPY N/A 9/19/2018     IH, EH, polyps (TAs w/low grade dysplasia)   •  COLONOSCOPY N/A 2020     Procedure: COLONOSCOPY;  Surgeon: Jose Enrique Louie MD;  Location:  [facility];  Service: Gastroenterology;  Laterality: N/A;  Pre op-Anemia, Melena, History of Polyps  Post op-To Cecum/TI, Polyp, Poor Prep   • CORONARY ARTERY BYPASS GRAFT   2001   • ENDOSCOPY N/A 2020     Procedure: ESOPHAGOGASTRODUODENOSCOPY with biopsies;  Surgeon: Amanda Lovelace MD;  Location:  [facility];  Service: Gastroenterology;  Laterality: N/A;  pre-anemia, dark stools  post-esophagitis, hiatal hernia   • ENDOSCOPY N/A 9/15/2020     Procedure: ESOPHAGOGASTRODUODENOSCOPY WITH BIOPSIES;  Surgeon: Jose Enrique Louie MD;  Location:  [facility];  Service: Gastroenterology;  Laterality: N/A;  pre: history of melena and esophagitis  post: mild esophagitis and gastritis, small hiatal hernia   • HERNIA REPAIR Left 2019   • TONSILLECTOMY       • VASECTOMY             FAMILY HISTORY           Problem Relation Age of Onset   • Diabetes Mother     • Hypertension Mother     • Macular degeneration Mother     • Alcohol abuse Father     • Cancer Father           lung,  age 65   • Heart disease Father     • Alcohol abuse Brother     • Cirrhosis Brother            age 50   • Liver cancer Brother 45   • Diabetes Son     • Kidney failure Son     • Autism Son     • Malig Hyperthermia Neg Hx        SOCIAL HISTORY                 Socioeconomic History   • Marital status:        Spouse name: Lissette   • Number of children: 3   • Years of education: college   Tobacco Use   • Smoking status: Never Smoker   • Smokeless tobacco: Never Used   • Tobacco comment: daily caffeine   Vaping Use   • Vaping Use: Never used   Substance and Sexual Activity   • Alcohol use: No   • Drug use: No   • Sexual activity: Defer         ALLERGIES  Prozac [fluoxetine hcl]  Home medications reviewed     REVIEW OF SYSTEMS  All systems reviewed and negative except for those discussed in HPI.      PHYSICAL  "EXAM   Blood pressure 147/83, pulse 67, temperature 97.2 °F (36.2 °C), temperature source Oral, resp. rate 16, height 182.9 cm (72\"), SpO2 99 %.    GENERAL: not distressed  HENT: nares patent  EYES: no scleral icterus  CV: regular rhythm, regular rate  RESPIRATORY: normal effort moving air bilaterally  ABDOMEN: soft, nontender nondistended bowel sounds positive  MUSCULOSKELETAL: no deformity right arm is well-perfused palpable thrill to fistula no obvious signs of infection  NEURO: alert, moves all extremities, follows commands  SKIN: warm, dry     LAB RESULTS  Lab Results (last 24 hours)     Procedure Component Value Units Date/Time    POC Glucose Once [[id number]]  (Abnormal) Collected: 06/04/22 1131    Specimen: Blood Updated: 06/04/22 1133     Glucose 155 mg/dL      Comment: Meter: [id number] : [id number] Blane RAMIREZ       POC Glucose Once [[id number]]  (Normal) Collected: 06/04/22 0821    Specimen: Blood Updated: 06/04/22 0822     Glucose 108 mg/dL      Comment: Meter: [id number] : [id number] Blane RAMIREZ       Comprehensive Metabolic Panel [[id number]]  (Abnormal) Collected: 06/04/22 0720    Specimen: Blood Updated: 06/04/22 0818     Glucose 101 mg/dL      BUN 34 mg/dL      Creatinine 2.29 mg/dL      Sodium 140 mmol/L      Potassium 4.3 mmol/L      Chloride 110 mmol/L      CO2 20.1 mmol/L      Calcium 8.6 mg/dL      Total Protein 5.9 g/dL      Albumin 3.00 g/dL      ALT (SGPT) 8 U/L      AST (SGOT) 12 U/L      Alkaline Phosphatase 79 U/L      Total Bilirubin 0.2 mg/dL      Globulin 2.9 gm/dL      A/G Ratio 1.0 g/dL      BUN/Creatinine Ratio 14.8     Anion Gap 9.9 mmol/L      eGFR 30.5 mL/min/1.73      Comment: National Kidney Foundation and American Society of Nephrology (ASN) Task Force recommended calculation based on the Chronic Kidney Disease Epidemiology Collaboration (CKD-EPI) equation refit without adjustment for race.       Narrative:      GFR Normal >60  Chronic Kidney Disease " <60  Kidney Failure <15      CBC (No Diff) [[id number]]  (Abnormal) Collected: 06/04/22 0720    Specimen: Blood Updated: 06/04/22 0802     WBC 6.79 10*3/mm3      RBC 3.16 10*6/mm3      Hemoglobin 8.8 g/dL      Hematocrit 27.5 %      MCV 87.0 fL      MCH 27.8 pg      MCHC 32.0 g/dL      RDW 12.9 %      RDW-SD 40.4 fl      MPV 9.9 fL      Platelets 260 10*3/mm3     COVID PRE-OP / PRE-PROCEDURE SCREENING ORDER (NO ISOLATION) - Swab, Nasopharynx [[id number]]  (Normal) Collected: 06/04/22 0000    Specimen: Swab from Nasopharynx Updated: 06/04/22 0041    Narrative:      The following orders were created for panel order COVID PRE-OP / PRE-PROCEDURE SCREENING ORDER (NO ISOLATION) - Swab, Nasopharynx.  Procedure                               Abnormality         Status                     ---------                               -----------         ------                     COVID-19,[facility]...[[id number]]  Normal              Final result                 Please view results for these tests on the individual orders.    COVID-19,[facility] CEPHEID/ERROL NP SWAB IN TRANSPORT MEDIA 8-12 HR TAT - Swab, Nasopharynx [[id number]]  (Normal) Collected: 06/04/22 0000    Specimen: Swab from Nasopharynx Updated: 06/04/22 0041     COVID19 Not Detected    Narrative:      Fact sheet for providers: https://www.fda.gov/media/085556/download    Fact sheet for patients: https://www.fda.gov/media/310564/download    Test performed by PCR.    Urinalysis, Microscopic Only - Urine, Clean Catch [[id number]] Collected: 06/03/22 2234    Specimen: Urine, Clean Catch Updated: 06/03/22 2346     RBC, UA 0-2 /HPF      WBC, UA None Seen /HPF      Bacteria, UA None Seen /HPF      Squamous Epithelial Cells, UA 0-2 /HPF      Hyaline Casts, UA None Seen /LPF      Methodology Manual Light Microscopy    Urinalysis With Microscopic If Indicated (No Culture) - Urine, Clean Catch [[id number]]  (Abnormal) Collected: 06/03/22 8959    Specimen: Urine, Clean Catch  Updated: 06/03/22 2301     Color, UA Yellow     Appearance, UA Clear     pH, UA <=5.0     Specific Gravity, UA 1.020     Glucose, UA Negative     Ketones, UA Negative     Bilirubin, UA Negative     Blood, UA Small (1+)     Protein, UA >=300 mg/dL (3+)     Leuk Esterase, UA Negative     Nitrite, UA Negative     Urobilinogen, UA 0.2 E.U./dL    Jeremiah Draw [[id number]] Collected: 06/03/22 2130    Specimen: Blood Updated: 06/03/22 2233    Narrative:      The following orders were created for panel order Jeremiah Draw.  Procedure                               Abnormality         Status                     ---------                               -----------         ------                     Green Top (Gel)[[id number]]                                  Final result               Lavender Top[[id number]]                                     Final result               Gold Top - SST[[id number]]                                                              Light Blue Top[[id number]]                                   Final result                 Please view results for these tests on the individual orders.    Lavender Top [[id number]] Collected: 06/03/22 2130    Specimen: Blood Updated: 06/03/22 2233     Extra Tube hold for add-on     Comment: Auto resulted       Light Blue Top [[id number]] Collected: 06/03/22 2130    Specimen: Blood Updated: 06/03/22 2233     Extra Tube Hold for add-ons.     Comment: Auto resulted       Green Top (Gel) [[id number]] Collected: 06/03/22 2130    Specimen: Blood Updated: 06/03/22 2233     Extra Tube Hold for add-ons.     Comment: Auto resulted.       Comprehensive Metabolic Panel [[id number]]  (Abnormal) Collected: 06/03/22 2130    Specimen: Blood Updated: 06/03/22 2206     Glucose 144 mg/dL      BUN 36 mg/dL      Creatinine 2.61 mg/dL      Sodium 139 mmol/L      Potassium 5.0 mmol/L      Comment: Slight hemolysis detected by analyzer. Results may be affected.        Chloride 107 mmol/L      CO2 21.0 mmol/L       Calcium 9.3 mg/dL      Total Protein 7.7 g/dL      Albumin 4.10 g/dL      ALT (SGPT) 14 U/L      AST (SGOT) 21 U/L      Comment: Slight hemolysis detected by analyzer. Results may be affected.        Alkaline Phosphatase 97 U/L      Total Bilirubin 0.2 mg/dL      Globulin 3.6 gm/dL      A/G Ratio 1.1 g/dL      BUN/Creatinine Ratio 13.8     Anion Gap 11.0 mmol/L      eGFR 26.1 mL/min/1.73      Comment: National Kidney Foundation and American Society of Nephrology (ASN) Task Force recommended calculation based on the Chronic Kidney Disease Epidemiology Collaboration (CKD-EPI) equation refit without adjustment for race.       Narrative:      GFR Normal >60  Chronic Kidney Disease <60  Kidney Failure <15      Troponin [[id number]]  (Abnormal) Collected: 06/03/22 2130    Specimen: Blood Updated: 06/03/22 2205     Troponin T 0.100 ng/mL     Narrative:      Troponin T Reference Range:  <= 0.03 ng/mL-   Negative for AMI  >0.03 ng/mL-     Abnormal for myocardial necrosis.  Clinicians would have to utilize clinical acumen, EKG, Troponin and serial changes to determine if it is an Acute Myocardial Infarction or myocardial injury due to an underlying chronic condition.       Results may be falsely decreased if patient taking Biotin.      Magnesium [[id number]]  (Normal) Collected: 06/03/22 2130    Specimen: Blood Updated: 06/03/22 2201     Magnesium 2.2 mg/dL     CBC & Differential [[id number]]  (Abnormal) Collected: 06/03/22 2130    Specimen: Blood Updated: 06/03/22 2140    Narrative:      The following orders were created for panel order CBC & Differential.  Procedure                               Abnormality         Status                     ---------                               -----------         ------                     CBC Auto Differential[[id number]]        Abnormal            Final result                 Please view results for these tests on the individual orders.    CBC Auto Differential [[id number]]   (Abnormal) Collected: 06/03/22 2130    Specimen: Blood Updated: 06/03/22 2140     WBC 9.98 10*3/mm3      RBC 3.76 10*6/mm3      Hemoglobin 10.4 g/dL      Hematocrit 31.8 %      MCV 84.6 fL      MCH 27.7 pg      MCHC 32.7 g/dL      RDW 12.9 %      RDW-SD 39.3 fl      MPV 9.7 fL      Platelets 340 10*3/mm3      Neutrophil % 81.2 %      Lymphocyte % 9.9 %      Monocyte % 6.1 %      Eosinophil % 2.1 %      Basophil % 0.3 %      Immature Grans % 0.4 %      Neutrophils, Absolute 8.10 10*3/mm3      Lymphocytes, Absolute 0.99 10*3/mm3      Monocytes, Absolute 0.61 10*3/mm3      Eosinophils, Absolute 0.21 10*3/mm3      Basophils, Absolute 0.03 10*3/mm3      Immature Grans, Absolute 0.04 10*3/mm3      nRBC 0.0 /100 WBC         Imaging Results (Last 24 Hours)     Procedure Component Value Units Date/Time    XR Chest 1 View [[id number]] Collected: 06/03/22 2058     Updated: 06/03/22 2105    Narrative:      XR CHEST 1 VW-     HISTORY: Male who is 67 years-old,  weakness     TECHNIQUE: Frontal view of the chest     COMPARISON: 06/01/2022     FINDINGS: The heart size is borderline. Pulmonary vasculature is  unremarkable. Sternotomy wires are present. Aorta is calcified. No focal  pulmonary consolidation, pleural effusion, or pneumothorax. No acute  osseous process.       Impression:      No focal pulmonary consolidation. Follow-up as clinical  indications persist.     This report was finalized on 6/3/2022 9:02 PM by Dr. Drake Dennis M.D.                ECG 12 Lead  Order: [id number]   Status: Preliminary result     Visible to patient: No (not released)     0 Result Notes    Component   Ref Range & Units 1 d ago   (6/3/22) 3 d ago   (6/1/22) 3 mo ago   (3/1/22) 4 mo ago   (2/3/22) 4 mo ago   (2/2/22) 4 mo ago   (2/1/22) 5 mo ago   (12/15/21)   QT Interval   ms 482 P  506  482  488  482  491  511    Resulting Agency [facility] ECG [facility] ECG [facility] ECG [facility] ECG [facility] ECG [facility] ECG [facility] ECG             Narrative & Impression    HEART RATE= 64  bpm  RR  Interval= 936  ms  NJ Interval= 93  ms  P Horizontal Axis= 78  deg  P Front Axis= 21  deg  QRSD Interval= 144  ms  QT Interval= 482  ms  QRS Axis= -82  deg  T Wave Axis= 99  deg  - ABNORMAL ECG -  Sinus rhythm  Short NJ interval  Right bundle branch block  Anterior infarct, old             Current Facility-Administered Medications:   •  amLODIPine (NORVASC) tablet 10 mg, 10 mg, Oral, Q24H, Ora Pickens MD, 10 mg at 06/04/22 0849  •  apixaban (ELIQUIS) tablet 2.5 mg, 2.5 mg, Oral, Q12H, Ora Pickens MD, 2.5 mg at 06/04/22 0849  •  aspirin EC tablet 81 mg, 81 mg, Oral, Daily, Ora Pickens MD, 81 mg at 06/04/22 1217  •  carvedilol (COREG) tablet 3.125 mg, 3.125 mg, Oral, BID With Meals, Ora Pickens MD, 3.125 mg at 06/04/22 0849  •  hydrALAZINE (APRESOLINE) injection 10 mg, 10 mg, Intravenous, Q4H PRN, Ora Pickens MD  •  HYDROcodone-acetaminophen (NORCO) 7.5-325 MG per tablet 1 tablet, 1 tablet, Oral, Q4H PRN, Ora Pickens MD  •  insulin lispro (ADMELOG) injection 0-7 Units, 0-7 Units, Subcutaneous, 4x Daily With Meals & Nightly, Ora Pickens MD, 2 Units at 06/04/22 1217  •  levothyroxine (SYNTHROID, LEVOTHROID) tablet 75 mcg, 75 mcg, Oral, Daily, Ora Pickens MD, 75 mcg at 06/04/22 1217  •  sodium chloride 0.9 % flush 10 mL, 10 mL, Intravenous, PRN, Riki Rey MD, 10 mL at 06/04/22 0849  •  tamsulosin (FLOMAX) 24 hr capsule 0.4 mg, 0.4 mg, Oral, Nightly, Ora Pickens MD     ASSESSMENT  Generalized weakness  Recent right arm AV fistula placement to start hemodialysis in the near future  Chronic kidney disease stage IV  Hypertension  Hyperlipidemia  Hypothyroidism  BPH  Gastroesophageal reflux disease    PLAN  Admit  Continue home medications  Stress ulcer DVT prophylaxis  PT OT  Discharge planning to see patient for subacute rehab placement  Supportive care  Patient is full code  Discussed with nursing staff  Follow closely and further recommendation current hospital course    ORA PICKENS MD

## 2022-06-05 LAB
ALBUMIN SERPL-MCNC: 3 G/DL (ref 3.5–5.2)
ALBUMIN/GLOB SERPL: 1.2 G/DL
ALP SERPL-CCNC: 73 U/L (ref 39–117)
ALT SERPL W P-5'-P-CCNC: 5 U/L (ref 1–41)
ANION GAP SERPL CALCULATED.3IONS-SCNC: 9.4 MMOL/L (ref 5–15)
AST SERPL-CCNC: 10 U/L (ref 1–40)
BASOPHILS # BLD AUTO: 0.02 10*3/MM3 (ref 0–0.2)
BASOPHILS NFR BLD AUTO: 0.3 % (ref 0–1.5)
BILIRUB SERPL-MCNC: 0.2 MG/DL (ref 0–1.2)
BUN SERPL-MCNC: 35 MG/DL (ref 8–23)
BUN/CREAT SERPL: 13.4 (ref 7–25)
CALCIUM SPEC-SCNC: 8.4 MG/DL (ref 8.6–10.5)
CHLORIDE SERPL-SCNC: 108 MMOL/L (ref 98–107)
CHOLEST SERPL-MCNC: 229 MG/DL (ref 0–200)
CO2 SERPL-SCNC: 20.6 MMOL/L (ref 22–29)
CREAT SERPL-MCNC: 2.61 MG/DL (ref 0.76–1.27)
DEPRECATED RDW RBC AUTO: 40.8 FL (ref 37–54)
EGFRCR SERPLBLD CKD-EPI 2021: 26.1 ML/MIN/1.73
EOSINOPHIL # BLD AUTO: 0.24 10*3/MM3 (ref 0–0.4)
EOSINOPHIL NFR BLD AUTO: 4.2 % (ref 0.3–6.2)
ERYTHROCYTE [DISTWIDTH] IN BLOOD BY AUTOMATED COUNT: 13 % (ref 12.3–15.4)
GLOBULIN UR ELPH-MCNC: 2.5 GM/DL
GLUCOSE BLDC GLUCOMTR-MCNC: 127 MG/DL (ref 70–130)
GLUCOSE BLDC GLUCOMTR-MCNC: 164 MG/DL (ref 70–130)
GLUCOSE BLDC GLUCOMTR-MCNC: 225 MG/DL (ref 70–130)
GLUCOSE BLDC GLUCOMTR-MCNC: 87 MG/DL (ref 70–130)
GLUCOSE SERPL-MCNC: 85 MG/DL (ref 65–99)
HBA1C MFR BLD: 6.9 % (ref 4.8–5.6)
HCT VFR BLD AUTO: 24.3 % (ref 37.5–51)
HDLC SERPL-MCNC: 42 MG/DL (ref 40–60)
HGB BLD-MCNC: 7.7 G/DL (ref 13–17.7)
IMM GRANULOCYTES # BLD AUTO: 0.02 10*3/MM3 (ref 0–0.05)
IMM GRANULOCYTES NFR BLD AUTO: 0.3 % (ref 0–0.5)
LDLC SERPL CALC-MCNC: 172 MG/DL (ref 0–100)
LDLC/HDLC SERPL: 4.06 {RATIO}
LYMPHOCYTES # BLD AUTO: 1.1 10*3/MM3 (ref 0.7–3.1)
LYMPHOCYTES NFR BLD AUTO: 19.2 % (ref 19.6–45.3)
MAGNESIUM SERPL-MCNC: 2.1 MG/DL (ref 1.6–2.4)
MCH RBC QN AUTO: 27.3 PG (ref 26.6–33)
MCHC RBC AUTO-ENTMCNC: 31.7 G/DL (ref 31.5–35.7)
MCV RBC AUTO: 86.2 FL (ref 79–97)
MONOCYTES # BLD AUTO: 0.65 10*3/MM3 (ref 0.1–0.9)
MONOCYTES NFR BLD AUTO: 11.3 % (ref 5–12)
NEUTROPHILS NFR BLD AUTO: 3.71 10*3/MM3 (ref 1.7–7)
NEUTROPHILS NFR BLD AUTO: 64.7 % (ref 42.7–76)
NRBC BLD AUTO-RTO: 0 /100 WBC (ref 0–0.2)
PLATELET # BLD AUTO: 242 10*3/MM3 (ref 140–450)
PMV BLD AUTO: 10.1 FL (ref 6–12)
POTASSIUM SERPL-SCNC: 4.3 MMOL/L (ref 3.5–5.2)
PROT SERPL-MCNC: 5.5 G/DL (ref 6–8.5)
RBC # BLD AUTO: 2.82 10*6/MM3 (ref 4.14–5.8)
SODIUM SERPL-SCNC: 138 MMOL/L (ref 136–145)
TRIGL SERPL-MCNC: 83 MG/DL (ref 0–150)
TSH SERPL DL<=0.05 MIU/L-ACNC: 3.4 UIU/ML (ref 0.27–4.2)
VLDLC SERPL-MCNC: 15 MG/DL (ref 5–40)
WBC NRBC COR # BLD: 5.74 10*3/MM3 (ref 3.4–10.8)

## 2022-06-05 PROCEDURE — 83036 HEMOGLOBIN GLYCOSYLATED A1C: CPT | Performed by: HOSPITALIST

## 2022-06-05 PROCEDURE — 97530 THERAPEUTIC ACTIVITIES: CPT

## 2022-06-05 PROCEDURE — 80061 LIPID PANEL: CPT | Performed by: HOSPITALIST

## 2022-06-05 PROCEDURE — 82962 GLUCOSE BLOOD TEST: CPT

## 2022-06-05 PROCEDURE — 84443 ASSAY THYROID STIM HORMONE: CPT | Performed by: HOSPITALIST

## 2022-06-05 PROCEDURE — 63710000001 INSULIN LISPRO (HUMAN) PER 5 UNITS: Performed by: HOSPITALIST

## 2022-06-05 PROCEDURE — 83735 ASSAY OF MAGNESIUM: CPT | Performed by: INTERNAL MEDICINE

## 2022-06-05 PROCEDURE — 97161 PT EVAL LOW COMPLEX 20 MIN: CPT

## 2022-06-05 PROCEDURE — 80053 COMPREHEN METABOLIC PANEL: CPT | Performed by: HOSPITALIST

## 2022-06-05 PROCEDURE — 85025 COMPLETE CBC W/AUTO DIFF WBC: CPT | Performed by: HOSPITALIST

## 2022-06-05 RX ADMIN — PANTOPRAZOLE SODIUM 40 MG: 40 TABLET, DELAYED RELEASE ORAL at 05:09

## 2022-06-05 RX ADMIN — CARVEDILOL 3.12 MG: 3.12 TABLET, FILM COATED ORAL at 09:19

## 2022-06-05 RX ADMIN — APIXABAN 2.5 MG: 2.5 TABLET, FILM COATED ORAL at 09:19

## 2022-06-05 RX ADMIN — HYDROCODONE BITARTRATE AND ACETAMINOPHEN 1 TABLET: 7.5; 325 TABLET ORAL at 04:09

## 2022-06-05 RX ADMIN — TAMSULOSIN HYDROCHLORIDE 0.4 MG: 0.4 CAPSULE ORAL at 20:00

## 2022-06-05 RX ADMIN — AMLODIPINE BESYLATE 10 MG: 10 TABLET ORAL at 09:19

## 2022-06-05 RX ADMIN — APIXABAN 2.5 MG: 2.5 TABLET, FILM COATED ORAL at 20:00

## 2022-06-05 RX ADMIN — ASPIRIN 81 MG: 81 TABLET, COATED ORAL at 09:23

## 2022-06-05 RX ADMIN — LEVOTHYROXINE SODIUM 75 MCG: 0.07 TABLET ORAL at 09:19

## 2022-06-05 RX ADMIN — HYDROCODONE BITARTRATE AND ACETAMINOPHEN 1 TABLET: 7.5; 325 TABLET ORAL at 20:03

## 2022-06-05 RX ADMIN — INSULIN LISPRO 3 UNITS: 100 INJECTION, SOLUTION INTRAVENOUS; SUBCUTANEOUS at 17:36

## 2022-06-05 RX ADMIN — CARVEDILOL 3.12 MG: 3.12 TABLET, FILM COATED ORAL at 17:36

## 2022-06-05 NOTE — PLAN OF CARE
Goal Outcome Evaluation:    A&OX4 - relativity flat affect but cooperative and pleasant. Drowsy today, stated unable to sleep recently. Fistula to right arm, bruit and thrill present. Tender in area still requiring pain medication over night. AC/HS requiring dinner coverage, sleeping during lunch. VSS, no distress noted. Bed in lowest locked position with call light, and tray table all within reach. Bed alarm active and audible. Wife currently at bedside Will cont to monitor.

## 2022-06-05 NOTE — THERAPY EVALUATION
Patient Name: Carlito Mo  : 1955    MRN: [id number]                              Today's Date: 2022       Admit Date: 6/3/2022    Visit Dx:     ICD-10-CM ICD-9-CM   1. Weakness  R53.1 780.79   2. Hypertension, unspecified type  I10 401.9   3. Chronic kidney disease, unspecified CKD stage  N18.9 585.9     Patient Active Problem List   Diagnosis   • Major depressive disorder with single episode, in partial remission ([facility])   • Other hyperlipidemia   • Vitamin D deficiency   • Erectile dysfunction   • Chronic fatigue   • Type 2 diabetes mellitus with ophthalmic complication ([facility])   • Skin lesion of chest wall   • Slow transit constipation   • Benign prostatic hyperplasia with nocturia   • History of basal cell carcinoma   • High blood pressure associated with diabetes ([facility])   • Acquired hypothyroidism   • Hypertensive urgency   • Recurrent strokes ([facility])   • Urinary retention   • Pneumonia   • Acute on chronic combined systolic and diastolic congestive heart failure ([facility])   • Acute respiratory failure with hypoxia ([facility])   • Suspected sleep apnea   • Pleural effusion   • YAW (obstructive sleep apnea)   • Coronary artery disease involving native coronary artery of native heart without angina pectoris   • Chronically elevated troponin   • Colon cancer screening   • Enlarged lymph nodes   • Functional gait abnormality   • History of myocardial infarction   • Hospital discharge follow-up   • Insomnia   • Iron deficiency anemia   • Major depression, recurrent ([facility])   • Anemia, chronic renal failure, stage 3 (moderate) ([facility])   • Essential hypertension   • Adverse effect of iron and its compounds, initial encounter   • Acute on chronic renal insufficiency   • Debility   • Falls frequently   • Acute pain of right shoulder   • Acute on chronic anemia   • Heme positive stool   • Neurogenic orthostatic hypotension ([facility])   • Symptomatic anemia   • History of colon polyps   • Helicobacter pylori gastritis   • Esophagitis    • Sleep related hypoxia   • Embolic stroke (HCC)   • Patent foramen ovale   • Pleural effusion, bilateral   • Cardiomyopathy (HCC)   • Lower abdominal pain   • Diarrhea   • CKD (chronic kidney disease) stage 4, GFR 15-29 ml/min (HCC)   • Hypertension not at goal   • Memory loss due to medical condition   • Generalized weakness   • ERRONEOUS ENCOUNTER--DISREGARD   • Weakness     Past Medical History:   Diagnosis Date   • Acquired hypothyroidism    • Acute congestive heart failure (HCC)    • Acute respiratory failure with hypoxia (HCC)    • Acute sinusitis    • SYLVAIN (acute kidney injury) (HCC)     on CKD   • Anemia    • Arthritis    • CAD (coronary artery disease)    • Cancer (HCC)     skin   • Chronic combined systolic and diastolic congestive heart failure (HCC)    • Chronic ischemic heart disease    • Chronic kidney disease (CKD)    • CKD (chronic kidney disease)    • COPD (chronic obstructive pulmonary disease) (HCC)    • Depression    • Diabetes mellitus type 2, uncontrolled, without complications    • Diabetic neuropathy (HCC)    • Disease of thyroid gland    • Elevated cholesterol    • Fatigue    • Frequent PVCs    • Generalized weakness    • GERD (gastroesophageal reflux disease)    • Heart attack (HCC)    • History of coronary artery disease     with remote history of bypass surgery in 2001   • Hyperlipidemia    • Hypertension    • Hypertensive encephalopathy    • Mental status change     Acute   • NO DEVICE/RECALLED    • Pleural effusion    • Pneumonia    • Poor historian    • RBBB (right bundle branch block)    • Stroke (HCC)     POOR VISION   • TIA (transient ischemic attack)    • Type 2 diabetes mellitus (HCC)    • Urinary retention    • Vitamin D deficiency      Past Surgical History:   Procedure Laterality Date   • APPENDECTOMY N/A 02/14/2016    Dr. Alexey Dodson   • COLONOSCOPY      over 20 years ago.  no polyps    • COLONOSCOPY N/A 9/18/2018    Procedure: COLONOSCOPY;  Surgeon: Jose Enrique Louie  MD KRIS;  Location: [facility] ENDOSCOPY;  Service: Gastroenterology   • COLONOSCOPY N/A 9/19/2018    IH, EH, polyps (TAs w/low grade dysplasia)   • COLONOSCOPY N/A 6/1/2020    Procedure: COLONOSCOPY;  Surgeon: Jose Enrique Louie MD;  Location: [facility] ENDOSCOPY;  Service: Gastroenterology;  Laterality: N/A;  Pre op-Anemia, Melena, History of Polyps  Post op-To Cecum/TI, Polyp, Poor Prep   • CORONARY ARTERY BYPASS GRAFT  11/2001   • ENDOSCOPY N/A 5/29/2020    Procedure: ESOPHAGOGASTRODUODENOSCOPY with biopsies;  Surgeon: Amanda Lovelace MD;  Location: [facility] ENDOSCOPY;  Service: Gastroenterology;  Laterality: N/A;  pre-anemia, dark stools  post-esophagitis, hiatal hernia   • ENDOSCOPY N/A 9/15/2020    Procedure: ESOPHAGOGASTRODUODENOSCOPY WITH BIOPSIES;  Surgeon: Jose Enrique Louie MD;  Location: [facility] ENDOSCOPY;  Service: Gastroenterology;  Laterality: N/A;  pre: history of melena and esophagitis  post: mild esophagitis and gastritis, small hiatal hernia   • HERNIA REPAIR Left 12/05/2019   • TONSILLECTOMY     • VASECTOMY        General Information     Row Name 06/05/22 1233          Physical Therapy Time and Intention    Document Type evaluation;therapy note (daily note)  -     Mode of Treatment physical therapy;individual therapy  -     Row Name 06/05/22 1233          General Information    Patient Profile Reviewed yes  -KH     Prior Level of Function independent:;all household mobility;community mobility;gait;transfer;bed mobility  with SC  -     Existing Precautions/Restrictions fall  -     Barriers to Rehab none identified  -     Row Name 06/05/22 1233          Home Main Entrance    Number of Stairs, Main Entrance four  -KH     Stair Railings, Main Entrance railing on right side (ascending)  -     Row Name 06/05/22 1233          Stairs Within Home, Primary    Stairs, Within Home, Primary ptr reports they live in a tri-level  -KH     Number of Stairs, Within Home, Primary ten  -KH     Stair  Railings, Within Home, Primary railing on right side (ascending)  -     Row Name 06/05/22 1233          Cognition    Orientation Status (Cognition) oriented x 4  -     Row Name 06/05/22 1233          Safety Issues, Functional Mobility    Safety Issues Affecting Function (Mobility) awareness of need for assistance;impulsivity;insight into deficits/self-awareness;positioning of assistive device  -     Impairments Affecting Function (Mobility) balance;endurance/activity tolerance;strength  -     Comment, Safety Issues/Impairments (Mobility) fall prevention program maintained  -           User Key  (r) = Recorded By, (t) = Taken By, (c) = Cosigned By    Initials Name Provider Type     Beatriz Mares, PT Physical Therapist               Mobility     Row Name 06/05/22 1235          Bed Mobility    Bed Mobility supine-sit;sit-supine  -     Supine-Sit [place] (Bed Mobility) contact guard;verbal cues  -     Sit-Supine [place] (Bed Mobility) standby assist;verbal cues  -     Assistive Device (Bed Mobility) bed rails;head of bed elevated  -     Row Name 06/05/22 1235          Sit-Stand Transfer    Sit-Stand [place] (Transfers) contact guard;verbal cues  -     Assistive Device (Sit-Stand Transfers) walker, front-wheeled  -     Row Name 06/05/22 1235          Gait/Stairs (Locomotion)    [place] Level (Gait) contact guard;minimum assist (75% patient effort);verbal cues  -     Assistive Device (Gait) walker, front-wheeled  -     Distance in Feet (Gait) 180  -     Deviations/Abnormal Patterns (Gait) base of support, wide;eli decreased;gait speed decreased;stride length decreased  -     Bilateral Gait Deviations forward flexed posture  -     Comment, (Gait/Stairs) pt impulsive and quick to leave walker and reach for furniture to enter bathroom/upon return to bed. Pt ambulated short distances with HHA and CGA-min A.   -           User Key  (r) = Recorded By, (t) = Taken  By, (c) = Cosigned By    Initials Name Provider Type    Beatriz Pierson, PT Physical Therapist               Obj/Interventions     Row Name 06/05/22 1236          Range of Motion Comprehensive    Comment, General Range of Motion BLE gross ROM appear WFLs  -     Row Name 06/05/22 1236          Strength Comprehensive (MMT)    Comment, General Manual Muscle Testing (MMT) Assessment BLE gross strength >/=3+/5. Observed generalized weakness during functional mobility.  -[facility] Name 06/05/22 1238          Balance    Balance Assessment sitting static balance;standing static balance;standing dynamic balance  -     Static Sitting Balance supervision  -     Position, Sitting Balance unsupported;sitting edge of bed  -     Static Standing Balance contact guard  -     Dynamic Standing Balance contact guard;minimal assist  -     Position/Device Used, Standing Balance supported  -           User Key  (r) = Recorded By, (t) = Taken By, (c) = Cosigned By    Initials Name Provider Type    Beatriz Pierson, PT Physical Therapist               Goals/Plan     [facility] Name 06/05/22 1245          Bed Mobility Goal 1 (PT)    Activity/Assistive Device (Bed Mobility Goal 1, PT) bed mobility activities, all  -     [place]in Level/Cues Needed (Bed Mobility Goal 1, PT) independent  -     Time Frame (Bed Mobility Goal 1, PT) 10 days  -[facility] Name 06/05/22 1245          Transfer Goal 1 (PT)    Activity/Assistive Device (Transfer Goal 1, PT) transfers, all;walker, rolling  -KH     Coin Level/Cues Needed (Transfer Goal 1, PT) standby assist  -     Time Frame (Transfer Goal 1, PT) 10 days  -[facility] Name 06/05/22 1245          Gait Training Goal 1 (PT)    Activity/Assistive Device (Gait Training Goal 1, PT) gait (walking locomotion);walker, rolling  -KH     Coin Level (Gait Training Goal 1, PT) standby assist  -     Distance (Gait Training Goal 1, PT) 400 ft.  -     Time Frame (Gait Training  Goal 1, PT) 10 days  -     Row Name 06/05/22 1245          Therapy Assessment/Plan (PT)    Planned Therapy Interventions (PT) balance training;bed mobility training;gait training;home exercise program;patient/family education;strengthening;transfer training  -           User Key  (r) = Recorded By, (t) = Taken By, (c) = Cosigned By    Initials Name Provider Type     Beatriz Mares, PT Physical Therapist               Clinical Impression     Row Name 06/05/22 1238          Pain    Pretreatment Pain Rating 4/10  -     Posttreatment Pain Rating 4/10  -     Pain Location - Side/Orientation Right  -     Pain Location - shoulder  -     Pre/Posttreatment Pain Comment pt c/o generalized discomfort. Pt tolerated session.  -     Pain Intervention(s) Ambulation/increased activity;Repositioned  -     Row Name 06/05/22 1242 06/05/22 1241       Plan of Care Review    Outcome Evaluation Pt is a 66 y/o male presenting to hospital with c/o weakness and SOA. Pt admitted for weakness, CKD, and hypertension. Pt modified indpendent with all mobility at baseline using a SC. Pt presents lethargic and c/o pain but agreeable to work with PT. Pt presents with decreased strength, decreased endurance/activity tolerance, impaired balance, impulsivity, and decreased awareness for need for assistance impacting his mobility. Pt performed bed mobility with SBA-CGA, transferred with RWX and CGA, and ambulated 180 ft. with RWX and CGA-min A. Pt exhibited decreased safety awareness with mobility. Pt may benefit from acute skilled PT to address noted deficits and facilitate safe return home at [facility].  - Pt is a 66 y/o male presenting to hospital with c/o weakness and SOA. Pt admitted for weakness, CKD, and hypertension. Pt modified indpendent with mobility at baseline using a SC. Pt presents lethargic and c/o pain but agreeable to work with PT. Pt presents with decreased strength, decreased endurance/activity tolerance, impaired  balance, impulsivity, decreased  -    Row Name 06/05/22 1238          Plan of Care Review    Plan of Care Reviewed With patient  -     Outcome Evaluation Pt is a 68 y/o male presenting to hospital with c/o weakness and SOA. Pt admitted for weakness, CKD, and hypertension. Pt modified indpendent with mobility at baseline using a SC. Pt presents lethargic and c/o pain but agreeable to work with PT. Pt presents with decreased strength, decreased endurance/activity tolerance, impaired balance, impulsiveness,  -     Row Name 06/05/22 1242          Therapy Assessment/Plan (PT)    Patient/Family Therapy Goals Statement (PT) go home  -     Rehab Potential (PT) good, to achieve stated therapy goals  -     Criteria for Skilled Interventions Met (PT) yes;skilled treatment is necessary  -     Therapy Frequency (PT) 5 times/wk  -     Row Name 06/05/22 1242          Vital Signs    O2 Delivery Pre Treatment room air  -     O2 Delivery Intra Treatment room air  -     O2 Delivery Post Treatment room air  -     Row Name 06/05/22 1242          Positioning and Restraints    Pre-Treatment Position in bed  -     Post Treatment Position bed  -     In Bed supine;call light within reach;encouraged to call for assist;exit alarm on  -           User Key  (r) = Recorded By, (t) = Taken By, (c) = Cosigned By    Initials Name Provider Type     Beatriz Mares, PT Physical Therapist               Outcome Measures     Row Name 06/05/22 1246          How much help from another person do you currently need...    Turning from your back to your side while in flat bed without using bedrails? 4  -KH     Moving from lying on back to sitting on the side of a flat bed without bedrails? 3  -KH     Moving to and from a bed to a chair (including a wheelchair)? 3  -KH     Standing up from a chair using your arms (e.g., wheelchair, bedside chair)? 3  -KH     Climbing 3-5 steps with a railing? 3  -KH     To walk in hospital room? 3   -     AM-PAC 6 Clicks Score (PT) 19  -     Highest level of mobility 6 --> Walked 10 steps or more  -     Row Name 06/05/22 1246          Functional Assessment    Outcome Measure Options AM-PAC 6 Clicks Basic Mobility (PT)  -           User Key  (r) = Recorded By, (t) = Taken By, (c) = Cosigned By    Initials Name Provider Type    Beatriz Pierson, DENISSE Physical Therapist                             Physical Therapy Education                 Title: PT OT SLP Therapies (Done)     Topic: Physical Therapy (Done)     Point: Mobility training (Done)     Learning Progress Summary           Patient Acceptance, E, VU,NR by  at 6/5/2022 1247    Acceptance, E, VU by KS at 6/5/2022 1009                   Point: Home exercise program (Done)     Learning Progress Summary           Patient Acceptance, E, VU by KS at 6/5/2022 1009                   Point: Body mechanics (Done)     Learning Progress Summary           Patient Acceptance, E, VU,NR by  at 6/5/2022 1247    Acceptance, E, VU by KS at 6/5/2022 1009                   Point: Precautions (Done)     Learning Progress Summary           Patient Acceptance, E, VU,NR by  at 6/5/2022 1247    Acceptance, E, VU by KS at 6/5/2022 1009                               User Key     Initials Effective Dates Name Provider Type Discipline     06/16/21 -  Beatriz Mares, DENISSE Physical Therapist PT    KS 09/02/21 -  Johnna Tello, RN Registered Nurse Nurse              PT Recommendation and Plan  Planned Therapy Interventions (PT): balance training, bed mobility training, gait training, home exercise program, patient/family education, strengthening, transfer training  Plan of Care Reviewed With: patient  Outcome Evaluation: Pt is a 66 y/o male presenting to hospital with c/o weakness and SOA. Pt admitted for weakness, CKD, and hypertension. Pt modified indpendent with all mobility at baseline using a SC. Pt presents lethargic and c/o pain but agreeable to work with PT.  Pt presents with decreased strength, decreased endurance/activity tolerance, impaired balance, impulsivity, and decreased awareness for need for assistance impacting his mobility. Pt performed bed mobility with SBA-CGA, transferred with RWX and CGA, and ambulated 180 ft. with RWX and CGA-min A. Pt exhibited decreased safety awareness with mobility. Pt may benefit from acute skilled PT to address noted deficits and facilitate safe return home at [facility].     Time Calculation:    PT Charges     Row Name 06/05/22 1247             Time Calculation    Start Time 1212  -KH      Stop Time 1230  -KH      Time Calculation (min) 18 min  -KH      PT Non-Billable Time (min) 10 min  -KH      PT Received On 06/05/22  -KH      PT - Next Appointment 06/06/22  -KH      PT Goal Re-Cert Due Date 06/15/22  -KH              Timed Charges    01106 - PT Therapeutic Activity Minutes 10  -KH              Untimed Charges    PT Eval/Re-eval Minutes 8  -KH              Total Minutes    Timed Charges Total Minutes 10  -KH      Untimed Charges Total Minutes 8  -KH       Total Minutes 18  -KH            User Key  (r) = Recorded By, (t) = Taken By, (c) = Cosigned By    Initials Name Provider Type    Beatriz Pierson, PT Physical Therapist              Therapy Charges for Today     Code Description Service Date Service Provider Modifiers Qty    69623924985 HC PT THERAPEUTIC ACT EA 15 MIN 6/5/2022 Beatriz Mares, PT GP 1    11607656564 HC PT EVAL LOW COMPLEXITY 2 6/5/2022 Beatriz Mares, PT GP 1          PT G-Codes  Outcome Measure Options: AM-PAC 6 Clicks Basic Mobility (PT)  AM-PAC 6 Clicks Score (PT): 19    Beatriz Mares PT  6/5/2022

## 2022-06-05 NOTE — PLAN OF CARE
Goal Outcome Evaluation:  Plan of Care Reviewed With: patient           Outcome Evaluation: Pt is a 66 y/o male presenting to hospital with c/o weakness and SOA. Pt admitted for weakness, CKD, and hypertension. Pt modified indpendent with all mobility at baseline using a SC. Pt presents lethargic and c/o pain but agreeable to work with PT. Pt presents with decreased strength, decreased endurance/activity tolerance, impaired balance, impulsivity, and decreased awareness for need for assistance impacting his mobility. Pt performed bed mobility with SBA-CGA, transferred with RWX and CGA, and ambulated 180 ft. with RWX and CGA-min A. Pt exhibited decreased safety awareness with mobility. Pt may benefit from acute skilled PT to address noted deficits and facilitate safe return home at [facility].    Patient was intermittently wearing a face mask during this therapy encounter. Therapist used appropriate personal protective equipment including mask and gloves.  Mask used was standard procedure mask. Appropriate PPE was worn during the entire therapy session. Hand hygiene was completed before and after therapy session. Patient is not in enhanced droplet precautions.

## 2022-06-05 NOTE — PROGRESS NOTES
"Daily progress note    Chief complaint  Doing better  No new complaints  Still feeling weak all over no focal weakness  Denies chest pain shortness of breath palpitation    History of present illness  67-year-old white male with history of chronic kidney disease stage IV diabetes mellitus hypertension hyperlipidemia and gastroesophageal reflux disease who is well-known to our service presented to [facility] emergency room with generalized weakness.  Patient has had dialysis fistula placed yesterday to start hemodialysis in the near future.  Patient stated that he cannot take care of himself.  Patient evaluated in ER admitted for PT OT and possible subacute rehab placement.     REVIEW OF SYSTEMS  All systems reviewed and negative except for those discussed in HPI.      PHYSICAL EXAM   Blood pressure 132/54, pulse 62, temperature 97.3 °F (36.3 °C), temperature source Oral, resp. rate 18, height 182.9 cm (72\"), weight 78.8 kg (173 lb 11.6 oz), SpO2 95 %.    GENERAL: not distressed  HENT: nares patent  EYES: no scleral icterus  CV: regular rhythm, regular rate  RESPIRATORY: normal effort moving air bilaterally  ABDOMEN: soft, nontender nondistended bowel sounds positive  MUSCULOSKELETAL: no deformity right arm is well-perfused palpable thrill to fistula no obvious signs of infection  NEURO: alert, moves all extremities, follows commands  SKIN: warm, dry     LAB RESULTS  Lab Results (last 24 hours)     Procedure Component Value Units Date/Time    POC Glucose Once [[id number]]  (Abnormal) Collected: 06/05/22 1134    Specimen: Blood Updated: 06/05/22 1136     Glucose 164 mg/dL      Comment: Meter: [id number] : [id number] Bartolome RAMIREZ       Hemoglobin A1c [[id number]]  (Abnormal) Collected: 06/05/22 0646    Specimen: Blood Updated: 06/05/22 0820     Hemoglobin A1C 6.90 %     Narrative:      Hemoglobin A1C Ranges:    Increased Risk for Diabetes  5.7% to 6.4%  Diabetes                     >= 6.5%  Diabetic " Goal                < 7.0%    TSH [[id number]]  (Normal) Collected: 06/05/22 0646    Specimen: Blood Updated: 06/05/22 0757     TSH 3.400 uIU/mL     Comprehensive Metabolic Panel [[id number]]  (Abnormal) Collected: 06/05/22 0646    Specimen: Blood Updated: 06/05/22 0753     Glucose 85 mg/dL      BUN 35 mg/dL      Creatinine 2.61 mg/dL      Sodium 138 mmol/L      Potassium 4.3 mmol/L      Chloride 108 mmol/L      CO2 20.6 mmol/L      Calcium 8.4 mg/dL      Total Protein 5.5 g/dL      Albumin 3.00 g/dL      ALT (SGPT) 5 U/L      AST (SGOT) 10 U/L      Alkaline Phosphatase 73 U/L      Total Bilirubin 0.2 mg/dL      Globulin 2.5 gm/dL      A/G Ratio 1.2 g/dL      BUN/Creatinine Ratio 13.4     Anion Gap 9.4 mmol/L      eGFR 26.1 mL/min/1.73      Comment: National Kidney Foundation and American Society of Nephrology (ASN) Task Force recommended calculation based on the Chronic Kidney Disease Epidemiology Collaboration (CKD-EPI) equation refit without adjustment for race.       Narrative:      GFR Normal >60  Chronic Kidney Disease <60  Kidney Failure <15      Lipid Panel [[id number]]  (Abnormal) Collected: 06/05/22 0646    Specimen: Blood Updated: 06/05/22 0753     Total Cholesterol 229 mg/dL      Triglycerides 83 mg/dL      HDL Cholesterol 42 mg/dL      LDL Cholesterol  172 mg/dL      VLDL Cholesterol 15 mg/dL      LDL/HDL Ratio 4.06    Narrative:      Cholesterol Reference Ranges  (U.S. Department of Health and Human Services ATP III Classifications)    Desirable          <200 mg/dL  Borderline High    200-239 mg/dL  High Risk          >240 mg/dL      Triglyceride Reference Ranges  (U.S. Department of Health and Human Services ATP III Classifications)    Normal           <150 mg/dL  Borderline High  150-199 mg/dL  High             200-499 mg/dL  Very High        >500 mg/dL    HDL Reference Ranges  (U.S. Department of Health and Human Services ATP III Classifications)    Low     <40 mg/dl (major risk factor for CHD)  High     >60 mg/dl ('negative' risk factor for CHD)        LDL Reference Ranges  (U.S. Department of Health and Human Services ATP III Classifications)    Optimal          <100 mg/dL  Near Optimal     100-129 mg/dL  Borderline High  130-159 mg/dL  High             160-189 mg/dL  Very High        >189 mg/dL    Magnesium [[id number]]  (Normal) Collected: 06/05/22 0646    Specimen: Blood Updated: 06/05/22 0753     Magnesium 2.1 mg/dL     CBC & Differential [[id number]]  (Abnormal) Collected: 06/05/22 0646    Specimen: Blood Updated: 06/05/22 0728    Narrative:      The following orders were created for panel order CBC & Differential.  Procedure                               Abnormality         Status                     ---------                               -----------         ------                     CBC Auto Differential[[id number]]        Abnormal            Final result                 Please view results for these tests on the individual orders.    CBC Auto Differential [[id number]]  (Abnormal) Collected: 06/05/22 0646    Specimen: Blood Updated: 06/05/22 0728     WBC 5.74 10*3/mm3      RBC 2.82 10*6/mm3      Hemoglobin 7.7 g/dL      Hematocrit 24.3 %      MCV 86.2 fL      MCH 27.3 pg      MCHC 31.7 g/dL      RDW 13.0 %      RDW-SD 40.8 fl      MPV 10.1 fL      Platelets 242 10*3/mm3      Neutrophil % 64.7 %      Lymphocyte % 19.2 %      Monocyte % 11.3 %      Eosinophil % 4.2 %      Basophil % 0.3 %      Immature Grans % 0.3 %      Neutrophils, Absolute 3.71 10*3/mm3      Lymphocytes, Absolute 1.10 10*3/mm3      Monocytes, Absolute 0.65 10*3/mm3      Eosinophils, Absolute 0.24 10*3/mm3      Basophils, Absolute 0.02 10*3/mm3      Immature Grans, Absolute 0.02 10*3/mm3      nRBC 0.0 /100 WBC     POC Glucose Once [[id number]]  (Normal) Collected: 06/05/22 0714    Specimen: Blood Updated: 06/05/22 0715     Glucose 87 mg/dL      Comment: Meter: [id number] : [id number] [employer]        POC Glucose Once [[id number]]   (Normal) Collected: 06/04/22 2132    Specimen: Blood Updated: 06/04/22 2133     Glucose 80 mg/dL      Comment: Meter: [id number] : [id number] Moo Daly CNA       POC Glucose Once [[id number]]  (Abnormal) Collected: 06/04/22 1627    Specimen: Blood Updated: 06/04/22 1629     Glucose 229 mg/dL      Comment: Meter: [id number] : [id number] Blane RAMIREZ       BNP [[id number]]  (Abnormal) Collected: 06/04/22 1505    Specimen: Blood Updated: 06/04/22 1608     proBNP 6,133.0 pg/mL     Narrative:      Among patients with dyspnea, NT-proBNP is highly sensitive for the detection of acute congestive heart failure. In addition NT-proBNP of <300 pg/ml effectively rules out acute congestive heart failure with 99% negative predictive value.    Results may be falsely decreased if patient taking Biotin.          Imaging Results (Last 24 Hours)     ** No results found for the last 24 hours. **             ECG 12 Lead  Order: [id number]   Status: Preliminary result     Visible to patient: No (not released)     0 Result Notes    Component   Ref Range & Units 1 d ago   (6/3/22) 3 d ago   (6/1/22) 3 mo ago   (3/1/22) 4 mo ago   (2/3/22) 4 mo ago   (2/2/22) 4 mo ago   (2/1/22) 5 mo ago   (12/15/21)   QT Interval   ms 482 P  506  482  488  482  491  511    Resulting Agency BH ECG BH ECG BH ECG BH ECG BH ECG BH ECG BH ECG             Narrative & Impression    HEART RATE= 64  bpm  RR Interval= 936  ms  ND Interval= 93  ms  P Horizontal Axis= 78  deg  P Front Axis= 21  deg  QRSD Interval= 144  ms  QT Interval= 482  ms  QRS Axis= -82  deg  T Wave Axis= 99  deg  - ABNORMAL ECG -  Sinus rhythm  Short ND interval  Right bundle branch block  Anterior infarct, old             Current Facility-Administered Medications:   •  amLODIPine (NORVASC) tablet 10 mg, 10 mg, Oral, Q24H, Josue Pickens MD, 10 mg at 06/05/22 0919  •  apixaban (ELIQUIS) tablet 2.5 mg, 2.5 mg, Oral, Q12H, Josue Pickens MD, 2.5 mg at 06/05/22 0919  •  aspirin EC  tablet 81 mg, 81 mg, Oral, Daily, Ora Pickens MD, 81 mg at 06/05/22 0923  •  carvedilol (COREG) tablet 3.125 mg, 3.125 mg, Oral, BID With Meals, Ora Pickens MD, 3.125 mg at 06/05/22 0919  •  HYDROcodone-acetaminophen (NORCO) 7.5-325 MG per tablet 1 tablet, 1 tablet, Oral, Q4H PRN, Ora Pickens MD, 1 tablet at 06/05/22 0409  •  insulin lispro (ADMELOG) injection 0-7 Units, 0-7 Units, Subcutaneous, 4x Daily With Meals & Nightly, Ora Pickens MD, 3 Units at 06/04/22 1748  •  levothyroxine (SYNTHROID, LEVOTHROID) tablet 75 mcg, 75 mcg, Oral, Daily, Ora Pickens MD, 75 mcg at 06/05/22 0919  •  pantoprazole (PROTONIX) EC tablet 40 mg, 40 mg, Oral, Q AM, Ora Pickens MD, 40 mg at 06/05/22 0509  •  sodium chloride 0.9 % flush 10 mL, 10 mL, Intravenous, PRN, Riki Rey MD, 10 mL at 06/04/22 0849  •  tamsulosin (FLOMAX) 24 hr capsule 0.4 mg, 0.4 mg, Oral, Nightly, Ora Pickens MD, 0.4 mg at 06/04/22 2029     ASSESSMENT  Generalized weakness  Recent right arm AV fistula placement   Chronic kidney disease stage IV  Hypertension  Hyperlipidemia  Hypothyroidism  BPH  Gastroesophageal reflux disease    PLAN  CPM  Continue home medications  Stress ulcer DVT prophylaxis  PT OT  Supportive care  Discussed with nursing staff  Discharge to subacute rehab once bed available    ORA PICKENS MD

## 2022-06-05 NOTE — PROGRESS NOTES
[facility] Progress Note      Patient Name: Carlito Mo  : 1955  MRN: [id number]  Primary Care Physician:  Florencia Caballero MD  Date of admission: 6/3/2022    Subjective     Interval History:   Follow-up on CKD stage IV  Remains relatively weak overall  Appetite stable  Denies lower extremity swelling or shortness of breath    Review of Systems:   As noted above    Objective     Vitals:   Temp:  [96.6 °F (35.9 °C)-97.4 °F (36.3 °C)] 97.3 °F (36.3 °C)  Heart Rate:  [62-67] 62  Resp:  [18] 18  BP: (132-152)/(54-78) 132/54    Intake/Output Summary (Last 24 hours) at 2022 1401  Last data filed at 2022 0933  Gross per 24 hour   Intake --   Output 400 ml   Net -400 ml       Physical Exam:    General Appearance: NAD  HEENT: oral mucosa normal, nonicteric sclera  Neck: supple, no JVD  Lungs: CTA  Heart: RRR, normal S1 and S2  Abdomen: soft, nondistended  Extremities: no edema  Neuro: Awake and alert and moving all extremities    Scheduled Meds:     amLODIPine, 10 mg, Oral, Q24H  apixaban, 2.5 mg, Oral, Q12H  aspirin, 81 mg, Oral, Daily  carvedilol, 3.125 mg, Oral, BID With Meals  insulin lispro, 0-7 Units, Subcutaneous, 4x Daily With Meals & Nightly  levothyroxine, 75 mcg, Oral, Daily  pantoprazole, 40 mg, Oral, Q AM  tamsulosin, 0.4 mg, Oral, Nightly      IV Meds:        Results Reviewed:   I have personally reviewed the results from the time of this admission to 2022 14:01 EDT     Results from last 7 days   Lab Units 22  0646 22  0720 22  2130   SODIUM mmol/L 138 140 139   POTASSIUM mmol/L 4.3 4.3 5.0   CHLORIDE mmol/L 108* 110* 107   CO2 mmol/L 20.6* 20.1* 21.0*   BUN mg/dL 35* 34* 36*   CREATININE mg/dL 2.61* 2.29* 2.61*   CALCIUM mg/dL 8.4* 8.6 9.3   BILIRUBIN mg/dL 0.2 0.2 0.2   ALK PHOS U/L 73 79 97   ALT (SGPT) U/L 5 8 14   AST (SGOT) U/L 10 12 21   GLUCOSE mg/dL 85 101* 144*     Estimated Creatinine Clearance: 30.6 mL/min (A) (by C-G formula  based on SCr of 2.61 mg/dL (H)).  Results from last 7 days   Lab Units 06/05/22  0646 06/03/22  2130   MAGNESIUM mg/dL 2.1 2.2         Results from last 7 days   Lab Units 06/05/22  0646 06/04/22  0720 06/03/22  2130 06/01/22  1934   WBC 10*3/mm3 5.74 6.79 9.98 6.45   HEMOGLOBIN g/dL 7.7* 8.8* 10.4* 9.1*   PLATELETS 10*3/mm3 242 260 340 257     Results from last 7 days   Lab Units 06/01/22  1934   INR  1.13*       Assessment / Plan     ASSESSMENT:  -CKD stage IV, proteinuric in nature, with baseline creatinine around 2.3 to 2.6 mg/dL, likely related to longstanding diabetic nephropathy, stable nature at this time  -Status post right radiocephalic AV fistula by Dr. Willis on 6/3 without complications  -Hypertension  -Type 2 diabetes  -BPH  -Generalized weakness    PLAN:  -Clinically appears euvolemic at this time, continue same  -Periodic surveillance labs  -PT OT and possible YING placement    Favio Krishna MD  06/05/22  14:01 EDT    [facility]  [phone]

## 2022-06-05 NOTE — PLAN OF CARE
Goal Outcome Evaluation:  Plan of Care Reviewed With: patient        Progress: no change  Outcome Evaluation: VSS, falls and limb precautions maintained, A/O, flat affect, blood sugar monitoring, c/o pain X1 (rt wrist) at site of recent av fistula placement treated w/ norco, generalized weakness

## 2022-06-06 ENCOUNTER — READMISSION MANAGEMENT (OUTPATIENT)
Dept: CALL CENTER | Facility: HOSPITAL | Age: 67
End: 2022-06-06

## 2022-06-06 ENCOUNTER — HOME HEALTH ADMISSION (OUTPATIENT)
Dept: HOME HEALTH SERVICES | Facility: HOME HEALTHCARE | Age: 67
End: 2022-06-06

## 2022-06-06 VITALS
SYSTOLIC BLOOD PRESSURE: 124 MMHG | HEART RATE: 63 BPM | HEIGHT: 72 IN | DIASTOLIC BLOOD PRESSURE: 64 MMHG | TEMPERATURE: 97.5 F | OXYGEN SATURATION: 94 % | RESPIRATION RATE: 18 BRPM | WEIGHT: 173.72 LBS | BODY MASS INDEX: 23.53 KG/M2

## 2022-06-06 LAB
ALBUMIN SERPL-MCNC: 3.1 G/DL (ref 3.5–5.2)
ANION GAP SERPL CALCULATED.3IONS-SCNC: 6 MMOL/L (ref 5–15)
BASOPHILS # BLD AUTO: 0.01 10*3/MM3 (ref 0–0.2)
BASOPHILS NFR BLD AUTO: 0.2 % (ref 0–1.5)
BUN SERPL-MCNC: 41 MG/DL (ref 8–23)
BUN/CREAT SERPL: 14.6 (ref 7–25)
CALCIUM SPEC-SCNC: 8.2 MG/DL (ref 8.6–10.5)
CHLORIDE SERPL-SCNC: 110 MMOL/L (ref 98–107)
CO2 SERPL-SCNC: 21 MMOL/L (ref 22–29)
CREAT SERPL-MCNC: 2.81 MG/DL (ref 0.76–1.27)
DEPRECATED RDW RBC AUTO: 39.2 FL (ref 37–54)
EGFRCR SERPLBLD CKD-EPI 2021: 23.9 ML/MIN/1.73
EOSINOPHIL # BLD AUTO: 0.27 10*3/MM3 (ref 0–0.4)
EOSINOPHIL NFR BLD AUTO: 5 % (ref 0.3–6.2)
ERYTHROCYTE [DISTWIDTH] IN BLOOD BY AUTOMATED COUNT: 12.9 % (ref 12.3–15.4)
GLUCOSE BLDC GLUCOMTR-MCNC: 139 MG/DL (ref 70–130)
GLUCOSE BLDC GLUCOMTR-MCNC: 171 MG/DL (ref 70–130)
GLUCOSE BLDC GLUCOMTR-MCNC: 213 MG/DL (ref 70–130)
GLUCOSE SERPL-MCNC: 122 MG/DL (ref 65–99)
HCT VFR BLD AUTO: 23.5 % (ref 37.5–51)
HGB BLD-MCNC: 7.6 G/DL (ref 13–17.7)
IMM GRANULOCYTES # BLD AUTO: 0.02 10*3/MM3 (ref 0–0.05)
IMM GRANULOCYTES NFR BLD AUTO: 0.4 % (ref 0–0.5)
LYMPHOCYTES # BLD AUTO: 1.12 10*3/MM3 (ref 0.7–3.1)
LYMPHOCYTES NFR BLD AUTO: 20.6 % (ref 19.6–45.3)
MCH RBC QN AUTO: 27.3 PG (ref 26.6–33)
MCHC RBC AUTO-ENTMCNC: 32.3 G/DL (ref 31.5–35.7)
MCV RBC AUTO: 84.5 FL (ref 79–97)
MONOCYTES # BLD AUTO: 0.59 10*3/MM3 (ref 0.1–0.9)
MONOCYTES NFR BLD AUTO: 10.8 % (ref 5–12)
NEUTROPHILS NFR BLD AUTO: 3.43 10*3/MM3 (ref 1.7–7)
NEUTROPHILS NFR BLD AUTO: 63 % (ref 42.7–76)
NRBC BLD AUTO-RTO: 0.2 /100 WBC (ref 0–0.2)
PHOSPHATE SERPL-MCNC: 4.7 MG/DL (ref 2.5–4.5)
PLATELET # BLD AUTO: 223 10*3/MM3 (ref 140–450)
PMV BLD AUTO: 10 FL (ref 6–12)
POTASSIUM SERPL-SCNC: 4.6 MMOL/L (ref 3.5–5.2)
RBC # BLD AUTO: 2.78 10*6/MM3 (ref 4.14–5.8)
SODIUM SERPL-SCNC: 137 MMOL/L (ref 136–145)
WBC NRBC COR # BLD: 5.44 10*3/MM3 (ref 3.4–10.8)

## 2022-06-06 PROCEDURE — 82962 GLUCOSE BLOOD TEST: CPT

## 2022-06-06 PROCEDURE — 80069 RENAL FUNCTION PANEL: CPT | Performed by: INTERNAL MEDICINE

## 2022-06-06 PROCEDURE — 63710000001 INSULIN LISPRO (HUMAN) PER 5 UNITS: Performed by: HOSPITALIST

## 2022-06-06 PROCEDURE — 85025 COMPLETE CBC W/AUTO DIFF WBC: CPT | Performed by: HOSPITALIST

## 2022-06-06 RX ORDER — AMLODIPINE BESYLATE 10 MG/1
10 TABLET ORAL
Qty: 30 TABLET | Refills: 0 | Status: SHIPPED | OUTPATIENT
Start: 2022-06-07 | End: 2022-07-07

## 2022-06-06 RX ORDER — PANTOPRAZOLE SODIUM 40 MG/1
40 TABLET, DELAYED RELEASE ORAL
Qty: 30 TABLET | Refills: 0 | Status: SHIPPED | OUTPATIENT
Start: 2022-06-07 | End: 2022-07-07

## 2022-06-06 RX ADMIN — CARVEDILOL 3.12 MG: 3.12 TABLET, FILM COATED ORAL at 08:37

## 2022-06-06 RX ADMIN — APIXABAN 2.5 MG: 2.5 TABLET, FILM COATED ORAL at 08:37

## 2022-06-06 RX ADMIN — INSULIN LISPRO 3 UNITS: 100 INJECTION, SOLUTION INTRAVENOUS; SUBCUTANEOUS at 11:22

## 2022-06-06 RX ADMIN — LEVOTHYROXINE SODIUM 75 MCG: 0.07 TABLET ORAL at 08:37

## 2022-06-06 RX ADMIN — PANTOPRAZOLE SODIUM 40 MG: 40 TABLET, DELAYED RELEASE ORAL at 06:10

## 2022-06-06 RX ADMIN — AMLODIPINE BESYLATE 10 MG: 10 TABLET ORAL at 08:37

## 2022-06-06 RX ADMIN — ASPIRIN 81 MG: 81 TABLET, COATED ORAL at 08:37

## 2022-06-06 NOTE — PROGRESS NOTES
[facility] Progress Note      Patient Name: Carlito Mo  : 1955  MRN: [id number]  Primary Care Physician:  Florencia Caballero MD  Date of admission: 6/3/2022    Subjective     Interval History:   Feels about the same:  still very weak  Appetite fair; no N/V  No SOB on RA    Review of Systems:   As noted above    Objective     Vitals:   Temp:  [97 °F (36.1 °C)-97.4 °F (36.3 °C)] 97.4 °F (36.3 °C)  Heart Rate:  [60-72] 64  Resp:  [18] 18  BP: (126-143)/(62-72) 136/68    Intake/Output Summary (Last 24 hours) at 2022 1443  Last data filed at 2022 1403  Gross per 24 hour   Intake 420 ml   Output 500 ml   Net -80 ml       Physical Exam:    General Appearance: alert, oriented x 3, NAD;  Skin: warm and dry  HEENT: oral mucosa normal, nonicteric sclera  Neck: supple, no JVD  Lungs: CTA; not labored  Heart: RRR, normal S1 and S2  Abdomen: soft, nontender, nondistended, BS +  : no palpable bladder  Extremities: no edema, cyanosis or clubbing; right arm AV fistula patent  Neuro: normal speech and mental status   Psychiatric: Flat affect and depressed mood    Scheduled Meds:     amLODIPine, 10 mg, Oral, Q24H  apixaban, 2.5 mg, Oral, Q12H  aspirin, 81 mg, Oral, Daily  carvedilol, 3.125 mg, Oral, BID With Meals  insulin lispro, 0-7 Units, Subcutaneous, 4x Daily With Meals & Nightly  levothyroxine, 75 mcg, Oral, Daily  pantoprazole, 40 mg, Oral, Q AM  tamsulosin, 0.4 mg, Oral, Nightly      IV Meds:        Results Reviewed:   I have personally reviewed the results from the time of this admission to 2022 14:43 EDT     Results from last 7 days   Lab Units 22  0830 22  0646 22  0720 22  2130   SODIUM mmol/L 137 138 140 139   POTASSIUM mmol/L 4.6 4.3 4.3 5.0   CHLORIDE mmol/L 110* 108* 110* 107   CO2 mmol/L 21.0* 20.6* 20.1* 21.0*   BUN mg/dL 41* 35* 34* 36*   CREATININE mg/dL 2.81* 2.61* 2.29* 2.61*   CALCIUM mg/dL 8.2* 8.4* 8.6 9.3   BILIRUBIN mg/dL  --  0.2  0.2 0.2   ALK PHOS U/L  --  73 79 97   ALT (SGPT) U/L  --  5 8 14   AST (SGOT) U/L  --  10 12 21   GLUCOSE mg/dL 122* 85 101* 144*       Estimated Creatinine Clearance: 28.4 mL/min (A) (by C-G formula based on SCr of 2.81 mg/dL (H)).    Results from last 7 days   Lab Units 06/06/22  0830 06/05/22  0646 06/03/22  2130   MAGNESIUM mg/dL  --  2.1 2.2   PHOSPHORUS mg/dL 4.7*  --   --              Results from last 7 days   Lab Units 06/06/22  0830 06/05/22  0646 06/04/22  0720 06/03/22 2130 06/01/22 1934   WBC 10*3/mm3 5.44 5.74 6.79 9.98 6.45   HEMOGLOBIN g/dL 7.6* 7.7* 8.8* 10.4* 9.1*   PLATELETS 10*3/mm3 223 242 260 340 257       Results from last 7 days   Lab Units 06/01/22 1934   INR  1.13*       Assessment / Plan     ASSESSMENT:  1.  SYLVAIN on CKD4, nonoliguric, worsening:  likely prerenal from hypovolemia; labile potassium.  Likely NAGMA.  Recently created right arm AV fistula  2.  Generalized weakness  3.  DM2  4.  Hypertension    PLAN:  1.  Encouraged him to eat and drink  2.  Will ensure he has close follow-up with Dr. Bryan Huddleston of our group    Thank you for involving us in the care of Carlito Mo.  Please feel free to call with any questions.    Ramakrishna Blake MD  06/06/22  14:43 EDT    [facility]  [phone]      Much of this encounter note is an electronic transcription/translation of spoken language to printed text. The electronic translation of spoken language may permit erroneous, or at times, nonsensical words or phrases to be inadvertently transcribed; Although I have reviewed the note for such errors, some may still exist.

## 2022-06-06 NOTE — CASE MANAGEMENT/SOCIAL WORK
Continued Stay Note  [facility]     Patient Name: Carlito Mo  MRN: [id number]  Today's Date: 6/6/2022    Admit Date: 6/3/2022     Discharge Plan     Row Name 06/06/22 1321       Plan    Plan Home with family assistance and [facility]    Plan Comments Spoke to Jovana with [facility] who states that they are able to accept the patient and provide HH P.T. for him. FAREED HELLER    Row Name 06/06/22 1303       Plan    Plan Plans home with family assistance and [facility]    Patient/Family in Agreement with Plan yes    Provided Post Acute Provider List? N/A    N/A Provider List Comment Agreeable to [facility]    Provided Post Acute Provider Quality & Resource List? N/A    N/A Quality & Resource List Comment Agreeable to [facility]    Plan Comments Met with the patient who requested for CCP to contact his wife to discuss d/c planning needs. Spoke to the patient’s spouse Lissette Mo [phone]; explained role of CCP, verified facesheet, checked IMM and discussed discharge planning needs.  They plan for the patient to return home upon d/c with assistance from his spouse.  They have 2 adult sons who reside with them- a 37 year old who is autistic and a 36 year old who just had a kidney transplant.  The patient has a cpap, cane and walker at home.  They have 3 steps with no handrails to enter his multi-level home and a side entrance that has no steps to enter.  They have about 8 steps with 2 handrails to get to the bedrooms in his home and 4 steps with 2 handrails to get to the basement in the home. The patient’s pharmacy is [facility] on [address] and the patient’s wife often has to remind him to take his medication and she reports that he does not typically have trouble affording his medication. The patient’s wife was informed that information on Extra Help for Medicare prescriptions was left in the patient’s room for their review.  The patient’s PCP is Florencia Caballero and he is signed up for Meds to Beds.  The patient’s wife is agreeable to a  referral to be made to [facility]- referral made to Jovana.  The patient’s wife denies any SNF history, was encouraged to bring his POA documents, states that she transports him to his appointments and will transport him home upon d/c.  Noted that [facility] referrals were also made for the patient.  Lidia with requested to inform Dr. Pickens of the patient’s plan to d/c home with [facility] and family assistance. [facility] will follow to see if [facility] can accept the patient.  FAREED So               Discharge Codes    No documentation.               Expected Discharge Date and Time     Expected Discharge Date Expected Discharge Time    Jun 7, 2022             FAREED Corea

## 2022-06-06 NOTE — CASE MANAGEMENT/SOCIAL WORK
Case Management Discharge Note      Final Note: Home with [facility]. FAREED HELLER    Provided Post Acute Provider List?: N/A  N/A Provider List Comment: Agreeable to [facility]  Provided Post Acute Provider Quality & Resource List?: N/A  N/A Quality & Resource List Comment: Agreeable to [facility]    Selected Continued Care - Admitted Since 6/3/2022     Destination    No services have been selected for the patient.              Durable Medical Equipment    No services have been selected for the patient.              Dialysis/Infusion    No services have been selected for the patient.              Home Medical Care Coordination complete.    Service Provider Selected Services Address Phone Fax Patient Preferred     [facility]  Home Health Services [address] [place] [address] [address] [phone] --          Therapy    No services have been selected for the patient.              Community Resources    No services have been selected for the patient.              Community & DME    No services have been selected for the patient.                Selected Continued Care - Prior Encounters Includes selections from prior encounters from 3/5/2022 to 6/6/2022    Discharged on 3/8/2022 Admission date: 3/1/2022 - Discharge disposition: Skilled Nursing Facility (DC - External)    Destination     Service Provider Selected Services Address Phone Fax Patient Preferred    [facility]  Skilled Nursing [address] [facility] [address] [phone] --          Home Medical Care     Service Provider Selected Services Address Phone Fax Patient Preferred    [facility] [address] [place], [address], [facility] [address] [phone] --                    Transportation Services  Private: Car    Final Discharge Disposition Code: 06 - home with home health care

## 2022-06-06 NOTE — PROGRESS NOTES
"Daily progress note    Chief complaint  Doing better  No new complaints  Denies chest pain shortness of breath palpitation    History of present illness  67-year-old white male with history of chronic kidney disease stage IV diabetes mellitus hypertension hyperlipidemia and gastroesophageal reflux disease who is well-known to our service presented to [facility] emergency room with generalized weakness.  Patient has had dialysis fistula placed yesterday to start hemodialysis in the near future.  Patient stated that he cannot take care of himself.  Patient evaluated in ER admitted for PT OT and possible subacute rehab placement.     REVIEW OF SYSTEMS  Unremarkable except weakness     PHYSICAL EXAM   Blood pressure 136/68, pulse 64, temperature 97.4 °F (36.3 °C), temperature source Oral, resp. rate 18, height 182.9 cm (72\"), weight 78.8 kg (173 lb 11.6 oz), SpO2 97 %.    GENERAL: not distressed  HENT: nares patent  EYES: no scleral icterus  CV: regular rhythm, regular rate  RESPIRATORY: normal effort moving air bilaterally  ABDOMEN: soft, nontender nondistended bowel sounds positive  MUSCULOSKELETAL: no deformity right arm is well-perfused palpable thrill to fistula no obvious signs of infection  NEURO: alert, moves all extremities, follows commands  SKIN: warm, dry     LAB RESULTS  Lab Results (last 24 hours)     Procedure Component Value Units Date/Time    POC Glucose Once [[id number]]  (Abnormal) Collected: 06/06/22 1118    Specimen: Blood Updated: 06/06/22 1119     Glucose 213 mg/dL      Comment: Meter: [id number] : [id number] Lexi RAMIREZ       Renal Function Panel [[id number]]  (Abnormal) Collected: 06/06/22 0830    Specimen: Blood Updated: 06/06/22 0923     Glucose 122 mg/dL      BUN 41 mg/dL      Creatinine 2.81 mg/dL      Sodium 137 mmol/L      Potassium 4.6 mmol/L      Chloride 110 mmol/L      CO2 21.0 mmol/L      Calcium 8.2 mg/dL      Albumin 3.10 g/dL      Phosphorus 4.7 mg/dL      Anion Gap " 6.0 mmol/L      BUN/Creatinine Ratio 14.6     eGFR 23.9 mL/min/1.73      Comment: National Kidney Foundation and American Society of Nephrology (ASN) Task Force recommended calculation based on the Chronic Kidney Disease Epidemiology Collaboration (CKD-EPI) equation refit without adjustment for race.       Narrative:      GFR Normal >60  Chronic Kidney Disease <60  Kidney Failure <15      CBC & Differential [[id number]]  (Abnormal) Collected: 06/06/22 0830    Specimen: Blood Updated: 06/06/22 0856    Narrative:      The following orders were created for panel order CBC & Differential.  Procedure                               Abnormality         Status                     ---------                               -----------         ------                     CBC Auto Differential[[id number]]        Abnormal            Final result                 Please view results for these tests on the individual orders.    CBC Auto Differential [[id number]]  (Abnormal) Collected: 06/06/22 0830    Specimen: Blood Updated: 06/06/22 0856     WBC 5.44 10*3/mm3      RBC 2.78 10*6/mm3      Hemoglobin 7.6 g/dL      Hematocrit 23.5 %      MCV 84.5 fL      MCH 27.3 pg      MCHC 32.3 g/dL      RDW 12.9 %      RDW-SD 39.2 fl      MPV 10.0 fL      Platelets 223 10*3/mm3      Neutrophil % 63.0 %      Lymphocyte % 20.6 %      Monocyte % 10.8 %      Eosinophil % 5.0 %      Basophil % 0.2 %      Immature Grans % 0.4 %      Neutrophils, Absolute 3.43 10*3/mm3      Lymphocytes, Absolute 1.12 10*3/mm3      Monocytes, Absolute 0.59 10*3/mm3      Eosinophils, Absolute 0.27 10*3/mm3      Basophils, Absolute 0.01 10*3/mm3      Immature Grans, Absolute 0.02 10*3/mm3      nRBC 0.2 /100 WBC     POC Glucose Once [[id number]]  (Abnormal) Collected: 06/06/22 0741    Specimen: Blood Updated: 06/06/22 0742     Glucose 139 mg/dL      Comment: Meter: [id number] : [id number] Lexiagustín Ahuja JAMES       POC Glucose Once [[id number]]  (Normal) Collected: 06/05/22 2014     Specimen: Blood Updated: 06/05/22 2015     Glucose 127 mg/dL      Comment: Meter: [id number] : [id number] Alvarado RAMIREZ       POC Glucose Once [[id number]]  (Abnormal) Collected: 06/05/22 1520    Specimen: Blood Updated: 06/05/22 1521     Glucose 225 mg/dL      Comment: RN Notified R and V Meter: [id number] : [id number] Jurgen Laguerre JAMES           Imaging Results (Last 24 Hours)     ** No results found for the last 24 hours. **             ECG 12 Lead  Order: [id number]   Status: Preliminary result     Visible to patient: No (not released)     0 Result Notes    Component   Ref Range & Units 1 d ago   (6/3/22) 3 d ago   (6/1/22) 3 mo ago   (3/1/22) 4 mo ago   (2/3/22) 4 mo ago   (2/2/22) 4 mo ago   (2/1/22) 5 mo ago   (12/15/21)   QT Interval   ms 482 P  506  482  488  482  491  511    Resulting Agency  ECG  ECG  ECG  ECG  ECG  ECG  ECG             Narrative & Impression    HEART RATE= 64  bpm  RR Interval= 936  ms  AK Interval= 93  ms  P Horizontal Axis= 78  deg  P Front Axis= 21  deg  QRSD Interval= 144  ms  QT Interval= 482  ms  QRS Axis= -82  deg  T Wave Axis= 99  deg  - ABNORMAL ECG -  Sinus rhythm  Short AK interval  Right bundle branch block  Anterior infarct, old             Current Facility-Administered Medications:   •  amLODIPine (NORVASC) tablet 10 mg, 10 mg, Oral, Q24H, Josue Pickens MD, 10 mg at 06/06/22 0837  •  apixaban (ELIQUIS) tablet 2.5 mg, 2.5 mg, Oral, Q12H, Josue Pickens MD, 2.5 mg at 06/06/22 0837  •  aspirin EC tablet 81 mg, 81 mg, Oral, Daily, Josue Pickens MD, 81 mg at 06/06/22 0837  •  carvedilol (COREG) tablet 3.125 mg, 3.125 mg, Oral, BID With Meals, Josue Pickens MD, 3.125 mg at 06/06/22 0837  •  HYDROcodone-acetaminophen (NORCO) 7.5-325 MG per tablet 1 tablet, 1 tablet, Oral, Q4H PRN, Josue Pickens MD, 1 tablet at 06/05/22 2003  •  insulin lispro (ADMELOG) injection 0-7 Units, 0-7 Units, Subcutaneous, 4x Daily With Meals & Nightly, Josue Pickens MD, 3 Units at  06/06/22 1122  •  levothyroxine (SYNTHROID, LEVOTHROID) tablet 75 mcg, 75 mcg, Oral, Daily, Ora Pickens MD, 75 mcg at 06/06/22 0837  •  pantoprazole (PROTONIX) EC tablet 40 mg, 40 mg, Oral, Q AM, Ora Pickens MD, 40 mg at 06/06/22 0610  •  sodium chloride 0.9 % flush 10 mL, 10 mL, Intravenous, PRN, Riki Rey MD, 10 mL at 06/04/22 0849  •  tamsulosin (FLOMAX) 24 hr capsule 0.4 mg, 0.4 mg, Oral, Nightly, Ora Pickens MD, 0.4 mg at 06/05/22 2000     ASSESSMENT  Generalized weakness  Recent right arm AV fistula placement   Chronic kidney disease stage IV  Hypertension  Hyperlipidemia  Hypothyroidism  BPH  Chronic anemia  Gastroesophageal reflux disease    PLAN  Discharge home with family support and home health  Discharge summary dictated    ORA PICKENS MD

## 2022-06-06 NOTE — PROGRESS NOTES
[facility] will follow post hospital. Patient/spouse agreeable to service. Contact information confirmed. Best to call spouse's # [phone] to schedule home health visits.

## 2022-06-06 NOTE — PLAN OF CARE
Goal Outcome Evaluation:           Progress: improving  Outcome Evaluation: pt is alert and oriented, room air, no falls, bed alarm on, up to bathroom with assistance, medicated PRN for pain, continue to monitor

## 2022-06-06 NOTE — DISCHARGE PLACEMENT REQUEST
"Carlito Mo (67 y.o. Male)             Date of Birth   1955    Social Security Number       Address   [address] [place] [address]    Home Phone   [phone]    MRN   [id number]       [facility]    Marital Status                               Admission Date   6/3/22    Admission Type   Emergency    Admitting Provider   Josue Pickens MD    Attending Provider   Josue Pickens MD    Department, Room/Bed   [address], [address]/1       Discharge Date       Discharge Disposition       Discharge Destination                               Attending Provider: Josue Pickens MD    Allergies: Prozac [Fluoxetine Hcl]    Isolation: None   Infection: None   Code Status: CPR   Advance Care Planning Activity    Ht: 182.9 cm (72\")   Wt: 78.8 kg (173 lb 11.6 oz)    Admission Cmt: None   Principal Problem: None                Active Insurance as of 6/3/2022     Primary Coverage     Payor Plan Insurance Group Employer/Plan Group    MEDICARE MEDICARE A & B      Payor Plan Address Payor Plan Phone Number Payor Plan Fax Number Effective Dates    [address] [phone]  5/1/2020 - None Entered    [facility] [id number]       Subscriber Name Subscriber Birth Date Member ID       CARLITO [place] 1955 [id number]           Secondary Coverage     Payor Plan Insurance Group Employer/Plan Group    [place]      Payor Plan Address Payor Plan Phone Number Payor Plan Fax Number Effective Dates    [address] [phone]  2/1/2022 - None Entered    [place] [address]       Subscriber Name Subscriber Birth Date Member ID       CARLITO MO 1955 [id number]                 Emergency Contacts      (Rel.) Home Phone Work Phone Mobile Phone    Lissette Mo (Spouse) [phone] -- [phone]            {Outbreak/Travel/Exposure Documentation......;  Question Available Choices Patient Response   COVID-19 Outbreak Screen:  Do you currently have a new onset " of the following symptoms?        Fever/Chills, Cough, Shortness of air, Loss of taste or smell, No, Unknown  No (06/03/22 1948)   COVID-19 Outbreak Screen: In the last 14 days, have you had contact with anyone who is ill, has show any of the symptoms listed above and/or has been diagnosis with the 2019 Novel Coronavirus? This includes any immediate household members but excludes any patients with whom you have been in contact within your normal work duties wearing proper PPE, if you are a healthcare worker.  Yes, No, Unknown              No (06/03/22 1948)   COVID-19 Outbreak Screen: Who was notified? Free text (not recorded)   Ebola Screening Outbreak Screen: Have you traveled to the [place] the [place] or [place] within the past 21 days?  Yes, No, Unknown (not recorded)   Ebola Screening Outbreak Screen: Do you have ANY of the following symptoms: Fever/Chills, Vomiting, Diarrhea, Fatigue, Headache, Muscle pain, Unexplained bleeding, Abdominal (stomach) pain, No, Unknown (not recorded)   Ebola Screening Outbreak Screen: Name of Person notified Free text (not recorded)   Travel Screen: Have you traveled in the last month? If so, to what country have you traveled? If [place] what state? Yes, No, Unknown  List of all countries  List of all States No (06/03/22 1948)  (not recorded)  (not recorded)   Infection Risk: Do you currently have the following symptoms?  (If cough is selected, the Tuberculosis Screen is performed.) Cough, Fever, Rash, No No (06/03/22 1948)   Tuberculosis Screen: Do you have any of the following Tuberculosis Risks?  · Have you lived or spent time with anyone who had or may have TB?  · Have you lived in or visited any of the following areas for more than one month: [place], [place], [place], [place] or [place], the [place] or [place]?  · Do you have HIV/AIDS?  · Have you lived in or worked in a nursing home, homeless shelter, correctional facility, or substance abuse treatment  facility?   · No    If Yes do you have any of the following symptoms? Yes responses display to the right    If Yes, symptoms listed are:  Cough greater than or equal to 3 weeks, Loss of appetite, Unexplained weight loss, Night sweats, Bloody sputum or hemoptysis, Hoarseness, Fever, Fatigue, Chest pain, No (not recorded)  (not recorded)   Exposure Screen: Have you been exposed to any of these contagious diseases in the last month? Measles, Chickenpox, Meningitis, Pertussis, Whooping Cough, No No (06/03/22 1948)

## 2022-06-06 NOTE — DISCHARGE SUMMARY
Discharge summary    Date of admission 6/3/2022  Date of discharge 6/6/2022    Final diagnosis  Recent right arm AV fistula placement   Chronic kidney disease stage IV  Diabetes mellitus  Hypertension  Hyperlipidemia  Hypothyroidism  BPH  Chronic anemia  Gastroesophageal reflux disease    Discharge medications    Current Facility-Administered Medications:   •  amLODIPine (NORVASC) tablet 10 mg, 10 mg, Oral, Q24H, Josue Pickens MD, 10 mg at 06/06/22 0837  •  apixaban (ELIQUIS) tablet 2.5 mg, 2.5 mg, Oral, Q12H, Josue Pickens MD, 2.5 mg at 06/06/22 0837  •  aspirin EC tablet 81 mg, 81 mg, Oral, Daily, Josue Pickens MD, 81 mg at 06/06/22 0837  •  carvedilol (COREG) tablet 3.125 mg, 3.125 mg, Oral, BID With Meals, Josue Pickens MD, 3.125 mg at 06/06/22 0837  •  insulin lispro (ADMELOG) injection 0-7 Units, 0-7 Units, Subcutaneous, 4x Daily With Meals & Nightly, Josue Pickens MD, 3 Units at 06/06/22 1122  •  levothyroxine (SYNTHROID, LEVOTHROID) tablet 75 mcg, 75 mcg, Oral, Daily, Josue Pickens MD, 75 mcg at 06/06/22 0837  •  pantoprazole (PROTONIX) EC tablet 40 mg, 40 mg, Oral, Q AM, Josue Pickens MD, 40 mg at 06/06/22 0610  •  tamsulosin (FLOMAX) 24 hr capsule 0.4 mg, 0.4 mg, Oral, Nightly, Josue Pickens MD, 0.4 mg at 06/05/22 2000     Consults obtained  Nephrology    Procedures  None    Hospital course  67-year-old white male with history of diabetes hypertension hyperlipidemia who also have chronic kidney disease and underwent recent right arm AV fistula placement admitted to emergency room with generalized weakness and unable to take care of himself and wanted to go to subacute rehab.  Patient admitted provided with supportive care followed by nephrology and home medication adjusted and further evaluated with PT OT and start making progress in walking more than 150 feet and stable to discharge home with family support and home health.    Discharge diet regular    Activity as tolerated    Medication as  above    Follow-up with prime doctor in 1 week and follow-up with nephrology per the instruction and take medications as directed    ORA MCDONALD MD

## 2022-06-06 NOTE — CASE MANAGEMENT/SOCIAL WORK
Discharge Planning Assessment  [facility]     Patient Name: Carlito Mo  MRN: [id number]  Today's Date: 6/6/2022    Admit Date: 6/3/2022     Discharge Needs Assessment     Row Name 06/06/22 1304       Living Environment    People in Home spouse;child(carlos), adult    Name(s) of People in Home spouse Lissette Mo [phone] and 2 adult sons    Current Living Arrangements home    Primary Care Provided by self;spouse/significant other    Provides Primary Care For no one, unable/limited ability to care for self    Family Caregiver if Needed spouse    Family Caregiver Names spouse Lissette Mo [phone]    Quality of Family Relationships helpful;involved;supportive    Able to Return to Prior Arrangements yes       Resource/Environmental Concerns    Resource/Environmental Concerns home accessibility    Home Accessibility Concerns stairs to enter home;stairs to access bedroom or bathroom  3 steps with no handrails to enter his multi-level home and a side entrance that has no steps to enter.  They have about 8 steps with 2 handrails to get to the bedrooms in his home and 4 steps with 2 handrails to get to the basement in the home.    Transportation Concerns no car       Transition Planning    Patient/Family Anticipates Transition to home with family    Patient/Family Anticipated Services at Transition home health care    Transportation Anticipated family or friend will provide       Discharge Needs Assessment    Equipment Currently Used at Home walker, rolling;cpap    Concerns to be Addressed discharge planning    Anticipated Changes Related to Illness none    Equipment Needed After Discharge none    Discharge Facility/Level of Care Needs home with home health    Current Discharge Risk physical impairment               Discharge Plan     Row Name 06/06/22 1304       Plan    Plan Plans home with family assistance and [facility]    Patient/Family in Agreement with Plan yes    Provided Post Acute Provider List? N/A    N/A  Provider List Comment Agreeable to [facility]    Provided Post Acute Provider Quality & Resource List? N/A    N/A Quality & Resource List Comment Agreeable to [facility]    Plan Comments Met with the patient who requested for CCP to contact his wife to discuss d/c planning needs. Spoke to the patient’s spouse Lissette Mo [phone]; explained role of CCP, verified facesheet, checked IMM and discussed discharge planning needs.  They plan for the patient to return home upon d/c with assistance from his spouse.  They have 2 adult sons who reside with them- a 37 year old who is autistic and a 36 year old who just had a kidney transplant.  The patient has a cpap, cane and walker at home.  They have 3 steps with no handrails to enter his multi-level home and a side entrance that has no steps to enter.  They have about 8 steps with 2 handrails to get to the bedrooms in his home and 4 steps with 2 handrails to get to the basement in the home. The patient’s pharmacy is [facility] on [address] and the patient’s wife often has to remind him to take his medication and she reports that he does not typically have trouble affording his medication. The patient’s wife was informed that information on Extra Help for Medicare prescriptions was left in the patient’s room for their review.  The patient’s PCP is Florencia Caballero and he is signed up for Meds to Beds.  The patient’s wife is agreeable to a referral to be made to [facility]- referral made to Jov[facility].  The patient’s wife denies any SNF history, was encouraged to bring his POA documents, states that she transports him to his appointments and will transport him home upon d/c.  Noted that [facility] referrals were also made for the patient.  Lidia with requested to inform Dr. Pickens of the patient’s plan to d/c home with [facility] and family assistance. [facility] will follow to see if [facility] can accept the patient.  FAREED So              Continued Care and Services - Admitted Since 6/3/2022     Home  Medical Care     Service Provider Request Status Selected Services Address Phone Fax Patient Preferred    [facility]  Pending - Request Sent N/A [address] [address] [phone] --            Selected Continued Care - Prior Encounters Includes selections from prior encounters from 3/5/2022 to 6/6/2022    Discharged on 3/8/2022 Admission date: 3/1/2022 - Discharge disposition: Skilled Nursing Facility (DC - External)    Destination     Service Provider Selected Services Address Phone Fax Patient Preferred    [facility]  Skilled Nursing [address] [facility] [address] [phone] --          Home Medical Care     Service Provider Selected Services Address Phone Fax Patient Preferred    [facility] [place] [address] [facility] [address] [place] [address] [address] [phone] --                       Demographic Summary     Row Name 06/06/22 1303       General Information    Admission Type inpatient    Arrived From home    Referral Source admission list    Reason for Consult discharge planning    Preferred Language English       Contact Information    Permission Granted to Share Info With family/designee               Functional Status     Row Name 06/06/22 1304       Functional Status    Usual Activity Tolerance moderate    Current Activity Tolerance fair       Functional Status, IADL    Medications assistive equipment    Meal Preparation assistive equipment    Housekeeping assistive equipment    Laundry assistive equipment    Shopping assistive equipment       Mental Status    General Appearance WDL WDL       Mental Status Summary    Recent Changes in Mental Status/Cognitive Functioning no changes               Psychosocial    No documentation.                Abuse/Neglect    No documentation.                Legal    No documentation.                Substance Abuse    No  documentation.                Patient Forms    No documentation.                   FAREED Corea

## 2022-06-08 ENCOUNTER — HOME CARE VISIT (OUTPATIENT)
Dept: HOME HEALTH SERVICES | Facility: HOME HEALTHCARE | Age: 67
End: 2022-06-08

## 2022-06-08 VITALS
HEIGHT: 78 IN | SYSTOLIC BLOOD PRESSURE: 150 MMHG | BODY MASS INDEX: 19.78 KG/M2 | HEART RATE: 68 BPM | DIASTOLIC BLOOD PRESSURE: 70 MMHG | TEMPERATURE: 95.9 F | OXYGEN SATURATION: 99 % | WEIGHT: 171 LBS | RESPIRATION RATE: 20 BRPM

## 2022-06-08 PROCEDURE — G0299 HHS/HOSPICE OF RN EA 15 MIN: HCPCS

## 2022-06-08 NOTE — CASE COMMUNICATION
Spoke to nurse prior to visit regarding my scheduled visit time, she communicated this to patient and spouse. When this PT arrived, patient was not home. Notified  so PT evaluation could be rescheduled.

## 2022-06-09 NOTE — HOME HEALTH
PT SEEN BY [facility] POST HOSPITAL STAY FOR GEN WEAKNESS AND INABLILITY TO CARE FOR HIMSELF.  PT HAD A/V SHUNT PLACED LAST FRIDAY AND WHEN HE GOT HOME HE WAS JUST  VERY WEAK AND EMS CALLED   PMH OF CHF, CKD STAGE IV, DM, HTN, HYPERLIPIDEMIA, CVA . PT DID NOT HAVE MEDS IN HOME AT  ADM CALLED [facility] AND THEY ARE TO CALL IN INSULIN, ELIQUIS AND COREG.      FOC POST A/V SHUNT , CKD, CHF, DM  I/S ON DISEASE PROCESS AND S/S EXACERBATION, MED INSTRUCT AND MONITOR COMPLIANCE, PAIN MANAGEMENT  I/S ON DIET AND MONITOR COMPLIANCE, I/S ON SKIN BREAKDOWN PREVENTION

## 2022-06-10 ENCOUNTER — HOME CARE VISIT (OUTPATIENT)
Dept: HOME HEALTH SERVICES | Facility: HOME HEALTHCARE | Age: 67
End: 2022-06-10

## 2022-06-10 ENCOUNTER — READMISSION MANAGEMENT (OUTPATIENT)
Dept: CALL CENTER | Facility: HOSPITAL | Age: 67
End: 2022-06-10

## 2022-06-10 ENCOUNTER — OFFICE VISIT (OUTPATIENT)
Dept: ENDOCRINOLOGY | Age: 67
End: 2022-06-10

## 2022-06-10 VITALS
HEART RATE: 108 BPM | OXYGEN SATURATION: 98 % | SYSTOLIC BLOOD PRESSURE: 124 MMHG | TEMPERATURE: 97.8 F | HEIGHT: 72 IN | DIASTOLIC BLOOD PRESSURE: 72 MMHG | BODY MASS INDEX: 23.68 KG/M2 | WEIGHT: 174.8 LBS

## 2022-06-10 DIAGNOSIS — Z91.14 NONCOMPLIANCE W/MEDICATION TREATMENT DUE TO INTERMIT USE OF MEDICATION: ICD-10-CM

## 2022-06-10 DIAGNOSIS — E11.22 TYPE 2 DIABETES MELLITUS WITH STAGE 5 CHRONIC KIDNEY DISEASE NOT ON CHRONIC DIALYSIS, WITH LONG-TERM CURRENT USE OF INSULIN: Primary | ICD-10-CM

## 2022-06-10 DIAGNOSIS — N18.4 CKD (CHRONIC KIDNEY DISEASE) STAGE 4, GFR 15-29 ML/MIN: ICD-10-CM

## 2022-06-10 DIAGNOSIS — R41.3 MEMORY LOSS DUE TO MEDICAL CONDITION: ICD-10-CM

## 2022-06-10 DIAGNOSIS — E03.9 ACQUIRED HYPOTHYROIDISM: ICD-10-CM

## 2022-06-10 DIAGNOSIS — Z79.4 TYPE 2 DIABETES MELLITUS WITH STAGE 5 CHRONIC KIDNEY DISEASE NOT ON CHRONIC DIALYSIS, WITH LONG-TERM CURRENT USE OF INSULIN: Primary | ICD-10-CM

## 2022-06-10 DIAGNOSIS — N18.5 TYPE 2 DIABETES MELLITUS WITH STAGE 5 CHRONIC KIDNEY DISEASE NOT ON CHRONIC DIALYSIS, WITH LONG-TERM CURRENT USE OF INSULIN: Primary | ICD-10-CM

## 2022-06-10 PROCEDURE — 99215 OFFICE O/P EST HI 40 MIN: CPT | Performed by: INTERNAL MEDICINE

## 2022-06-10 RX ORDER — ATORVASTATIN CALCIUM 20 MG/1
20 TABLET, FILM COATED ORAL
COMMUNITY
Start: 2022-05-24 | End: 2022-09-19 | Stop reason: HOSPADM

## 2022-06-10 NOTE — CASE COMMUNICATION
Dear NONA WORKMAN.     Re: ECTOR DANIEL   : 55    The SN visit  on 6/10/22 for the above patient was missed due to MD APPT , therefore, the prescribed frequency of visits was not met.    If you have questions or would like further information about this patient, please contact us at [phone].    Regards,  NICK MUÑOZ RN

## 2022-06-10 NOTE — OUTREACH NOTE
Medical Week 1 Survey    Flowsheet Row Responses   [facility] patient discharged from? [place]   Does the patient have one of the following disease processes/diagnoses(primary or secondary)? Other   Week 1 attempt successful? No   Unsuccessful attempts Attempt 1          JOSE G JONES - Registered Nurse

## 2022-06-14 ENCOUNTER — HOME CARE VISIT (OUTPATIENT)
Dept: HOME HEALTH SERVICES | Facility: HOME HEALTHCARE | Age: 67
End: 2022-06-14

## 2022-06-14 ENCOUNTER — READMISSION MANAGEMENT (OUTPATIENT)
Dept: CALL CENTER | Facility: HOSPITAL | Age: 67
End: 2022-06-14

## 2022-06-14 PROCEDURE — G0152 HHCP-SERV OF OT,EA 15 MIN: HCPCS

## 2022-06-14 NOTE — OUTREACH NOTE
Medical Week 1 Survey    Flowsheet Row Responses   [facility] patient discharged from? [place]   Does the patient have one of the following disease processes/diagnoses(primary or secondary)? Other   Week 1 attempt successful? No   Unsuccessful attempts Attempt 2          SRAVANTHI PADILLA - Licensed Nurse

## 2022-06-15 ENCOUNTER — HOME CARE VISIT (OUTPATIENT)
Dept: HOME HEALTH SERVICES | Facility: HOME HEALTHCARE | Age: 67
End: 2022-06-15

## 2022-06-15 VITALS
HEART RATE: 64 BPM | DIASTOLIC BLOOD PRESSURE: 68 MMHG | SYSTOLIC BLOOD PRESSURE: 124 MMHG | TEMPERATURE: 96.4 F | BODY MASS INDEX: 24.14 KG/M2 | WEIGHT: 178 LBS | RESPIRATION RATE: 20 BRPM

## 2022-06-15 PROCEDURE — G0299 HHS/HOSPICE OF RN EA 15 MIN: HCPCS

## 2022-06-15 NOTE — HOME HEALTH
Reason for Referral:  Hospitalized for general weakness, had AV shunt placement    Medical History:  CHF, CKD Stage 4, DM, HTN, Hyperlipidemia, CVA    Subjective: Patient reports being very weak    Home Environment: Patient lives in trilevel home with wife, son, has cane    PLOF: Independent    Medical Necessity: Patient requires skilled occupational therapy for remediation of deficits to improve safety in home and improve self care, functional transfers, mobility, strength, endurance, DME/adaptive equipment    Plan for Next Visit:  UE exercises, safety with adl tasks

## 2022-06-16 ENCOUNTER — READMISSION MANAGEMENT (OUTPATIENT)
Dept: CALL CENTER | Facility: HOSPITAL | Age: 67
End: 2022-06-16

## 2022-06-16 ENCOUNTER — HOME CARE VISIT (OUTPATIENT)
Dept: HOME HEALTH SERVICES | Facility: HOME HEALTHCARE | Age: 67
End: 2022-06-16

## 2022-06-16 VITALS
OXYGEN SATURATION: 99 % | SYSTOLIC BLOOD PRESSURE: 124 MMHG | DIASTOLIC BLOOD PRESSURE: 68 MMHG | TEMPERATURE: 96.8 F | HEART RATE: 54 BPM

## 2022-06-16 PROCEDURE — G0152 HHCP-SERV OF OT,EA 15 MIN: HCPCS

## 2022-06-16 NOTE — OUTREACH NOTE
Medical Week 1 Survey    Flowsheet Row Responses   [facility] patient discharged from? [place]   Does the patient have one of the following disease processes/diagnoses(primary or secondary)? Other   Week 1 attempt successful? Yes   Call start time 1251   Call end time 1259   Discharge diagnosis CJD right arm AV fistula placement, DM   Person spoke with today (if not patient) and relationship Lissette-spouse and pt   Meds reviewed with patient/caregiver? Yes   Is the patient having any side effects they believe may be caused by any medication additions or changes? No   Does the patient have all medications ordered at discharge? No   What is keeping the patient from filling the prescriptions? --  [has not p/u RX yet]   Nursing Interventions Nurse provided patient education   Is the patient taking all medications as directed (includes completed medication regime)? Yes   Comments regarding appointments Neph appt on 6/23/22, Neuro appt on 6/29/22   Does the patient have a primary care provider?  Yes   Does the patient have an appointment with their PCP within 7 days of discharge? No   Comments regarding PCP Extended care taking over for PCP   What is preventing the patient from scheduling follow up appointments within 7 days of discharge? Haven't had time   Nursing Interventions Educated patient on importance of making appointment   Has the patient kept scheduled appointments due by today? N/A   What is the Home health agency?  [facility]   Has home health visited the patient within 72 hours of discharge? Yes   Psychosocial issues? No   Did the patient receive a copy of their discharge instructions? Yes   Nursing interventions Reviewed instructions with patient   What is the patient's perception of their health status since discharge? Same  [fatigued]   Is the patient/caregiver able to teach back the hierarchy of who to call/visit for symptoms/problems? PCP, Specialist, Home health nurse, Urgent Care, ED, 911 Yes   If  the patient is a current smoker, are they able to teach back resources for cessation? Not a smoker   Week 1 call completed? Yes          CHRIS PADILLA - Registered Nurse

## 2022-06-17 ENCOUNTER — HOME CARE VISIT (OUTPATIENT)
Dept: HOME HEALTH SERVICES | Facility: HOME HEALTHCARE | Age: 67
End: 2022-06-17

## 2022-06-17 PROCEDURE — G0300 HHS/HOSPICE OF LPN EA 15 MIN: HCPCS

## 2022-06-20 VITALS
HEART RATE: 70 BPM | TEMPERATURE: 97.5 F | SYSTOLIC BLOOD PRESSURE: 130 MMHG | DIASTOLIC BLOOD PRESSURE: 62 MMHG | OXYGEN SATURATION: 97 %

## 2022-06-21 ENCOUNTER — HOME CARE VISIT (OUTPATIENT)
Dept: HOME HEALTH SERVICES | Facility: HOME HEALTHCARE | Age: 67
End: 2022-06-21

## 2022-06-21 ENCOUNTER — READMISSION MANAGEMENT (OUTPATIENT)
Dept: CALL CENTER | Facility: HOSPITAL | Age: 67
End: 2022-06-21

## 2022-06-21 PROCEDURE — G0152 HHCP-SERV OF OT,EA 15 MIN: HCPCS

## 2022-06-21 NOTE — OUTREACH NOTE
Medical Week 3 Survey    Flowsheet Row Responses   [facility] patient discharged from? [place]   Does the patient have one of the following disease processes/diagnoses(primary or secondary)? Other   Week 3 attempt successful? Yes   Call start time 1134   Call end time 1139   Discharge diagnosis CJD right arm,  AV fistula placement, DM   Meds reviewed with patient/caregiver? Yes   Is the patient taking all medications as directed (includes completed medication regime)? Yes   Comments regarding appointments Nephrology appt on 6/23/22, Neuro appt on 6/29/22   Has the patient kept scheduled appointments due by today? N/A   What is the patient's perception of their health status since discharge? Improving   Week 3 Call Completed? Yes          LAUREEN GONZALEZ - Registered Nurse

## 2022-06-23 ENCOUNTER — HOME CARE VISIT (OUTPATIENT)
Dept: HOME HEALTH SERVICES | Facility: HOME HEALTHCARE | Age: 67
End: 2022-06-23

## 2022-06-23 VITALS
DIASTOLIC BLOOD PRESSURE: 80 MMHG | HEART RATE: 69 BPM | TEMPERATURE: 97 F | SYSTOLIC BLOOD PRESSURE: 144 MMHG | OXYGEN SATURATION: 93 %

## 2022-06-23 NOTE — HOME HEALTH
Patient asleep upstairs, reports being so tired. Did not want to address shower today, agreed to try next visit. Discussed with patient and wife plan for OT discharge next visit.

## 2022-06-24 ENCOUNTER — HOME CARE VISIT (OUTPATIENT)
Dept: HOME HEALTH SERVICES | Facility: HOME HEALTHCARE | Age: 67
End: 2022-06-24

## 2022-06-28 ENCOUNTER — HOME CARE VISIT (OUTPATIENT)
Dept: HOME HEALTH SERVICES | Facility: HOME HEALTHCARE | Age: 67
End: 2022-06-28

## 2022-06-28 PROCEDURE — G0152 HHCP-SERV OF OT,EA 15 MIN: HCPCS

## 2022-06-29 ENCOUNTER — OFFICE VISIT (OUTPATIENT)
Dept: NEUROLOGY | Facility: CLINIC | Age: 67
End: 2022-06-29

## 2022-06-29 ENCOUNTER — READMISSION MANAGEMENT (OUTPATIENT)
Dept: CALL CENTER | Facility: HOSPITAL | Age: 67
End: 2022-06-29

## 2022-06-29 VITALS
DIASTOLIC BLOOD PRESSURE: 74 MMHG | SYSTOLIC BLOOD PRESSURE: 148 MMHG | WEIGHT: 186 LBS | HEIGHT: 72 IN | BODY MASS INDEX: 25.19 KG/M2 | OXYGEN SATURATION: 98 % | HEART RATE: 69 BPM

## 2022-06-29 VITALS
SYSTOLIC BLOOD PRESSURE: 150 MMHG | DIASTOLIC BLOOD PRESSURE: 84 MMHG | HEART RATE: 78 BPM | TEMPERATURE: 96.9 F | OXYGEN SATURATION: 90 %

## 2022-06-29 DIAGNOSIS — G47.33 OSA (OBSTRUCTIVE SLEEP APNEA): ICD-10-CM

## 2022-06-29 DIAGNOSIS — I63.9 RECURRENT STROKES: Primary | ICD-10-CM

## 2022-06-29 DIAGNOSIS — Z91.81 AT RISK FOR FALLS: ICD-10-CM

## 2022-06-29 DIAGNOSIS — Q21.12 PATENT FORAMEN OVALE: ICD-10-CM

## 2022-06-29 DIAGNOSIS — I10 ESSENTIAL HYPERTENSION: ICD-10-CM

## 2022-06-29 DIAGNOSIS — R41.89 SUBJECTIVE MEMORY COMPLAINTS: ICD-10-CM

## 2022-06-29 DIAGNOSIS — I25.10 CORONARY ARTERY DISEASE INVOLVING NATIVE CORONARY ARTERY OF NATIVE HEART WITHOUT ANGINA PECTORIS: ICD-10-CM

## 2022-06-29 PROCEDURE — 99215 OFFICE O/P EST HI 40 MIN: CPT | Performed by: NURSE PRACTITIONER

## 2022-06-29 RX ORDER — MEMANTINE HYDROCHLORIDE 5 MG/1
TABLET ORAL
Qty: 120 TABLET | Refills: 0 | Status: ON HOLD | OUTPATIENT
Start: 2022-06-29 | End: 2022-09-16

## 2022-06-29 RX ORDER — MEMANTINE HYDROCHLORIDE 5 MG/1
TABLET ORAL
Qty: 120 TABLET | Refills: 0 | Status: SHIPPED | OUTPATIENT
Start: 2022-06-29 | End: 2022-06-29

## 2022-06-29 RX ORDER — TORSEMIDE 20 MG/1
40 TABLET ORAL DAILY
COMMUNITY
Start: 2022-06-23 | End: 2022-07-29 | Stop reason: HOSPADM

## 2022-06-29 NOTE — OUTREACH NOTE
Medical Week 4 Survey    Flowsheet Row Responses   [facility] patient discharged from? [place]   Does the patient have one of the following disease processes/diagnoses(primary or secondary)? Other   Week 4 attempt successful? Yes   Call end time 1220   Is the patient taking all medications as directed (includes completed medication regime)? Yes   Psychosocial issues? No   What is the patient's perception of their health status since discharge? Improving   Is the patient/caregiver able to teach back signs and symptoms related to disease process for when to call PCP? Yes   Is the patient/caregiver able to teach back signs and symptoms related to disease process for when to call 911? Yes   Is the patient/caregiver able to teach back the hierarchy of who to call/visit for symptoms/problems? PCP, Specialist, Home health nurse, Urgent Care, ED, 911 Yes   If the patient is a current smoker, are they able to teach back resources for cessation? Not a smoker   Additional teach back comments States he is unable to refills on his humalog due to insurance won, t cover til  sends something in to pharmacy.  Advised to contact PCP office regarding this.    Week 4 Call Completed? Yes   Would the patient like one additional call? Yes   Wrap up additional comments Advised pt to contact PCP regarding humalog.          HYUN BASURTO - Licensed Nurse

## 2022-06-30 ENCOUNTER — HOME CARE VISIT (OUTPATIENT)
Dept: HOME HEALTH SERVICES | Facility: HOME HEALTHCARE | Age: 67
End: 2022-06-30

## 2022-06-30 VITALS
DIASTOLIC BLOOD PRESSURE: 80 MMHG | OXYGEN SATURATION: 98 % | HEART RATE: 72 BPM | TEMPERATURE: 97.4 F | RESPIRATION RATE: 20 BRPM | SYSTOLIC BLOOD PRESSURE: 152 MMHG

## 2022-06-30 PROCEDURE — G0299 HHS/HOSPICE OF RN EA 15 MIN: HCPCS

## 2022-06-30 RX ORDER — APIXABAN 2.5 MG/1
TABLET, FILM COATED ORAL
Qty: 60 TABLET | Refills: 0 | Status: SHIPPED | OUTPATIENT
Start: 2022-06-30 | End: 2022-09-19 | Stop reason: HOSPADM

## 2022-07-02 NOTE — HOME HEALTH
PT SEEN BY [facility] FOR WEAKNESS AND  CKD  AV SHUNT PLACED R WRIST SITE HEALED  PROBLEM IS NONCOMPLIANCWE WITH MEDS AND DIFF  GETTING IN TOUCH FOR PT AFTER CALLING AND EAVING MULTIPLE MESSAGES WITH PT AND SPOUSE     RN WOULD SHOW UP AND USUALLY WAS AT A [facility] SIGNING ORDERS BUT  JI HAS NOT SEEN YEST  AT THIS VISIT PT STILL WITHOUT ALL HIS MEDS  HAD  [facility] CALL THEM IN  BUT THEY CALLED THEM TO [place] AND NOT [facility]    AT LAST VISIT RN AGAIN REQUESTED SPOUSE MANAGE MEDS  DUE TO PT  NOT ABLE  OR DOES NOT  TAKE AS PRESCRIBED  MEDS   PT COULD NOT FIND DUE TO LEAVING THEM IN  CAR   THEY NOTED THEY STILL DID NOT HAVE ELIQUIS , OR INSULIN RN CALLED  [facility] A[facility] AND THEY ARE TO CALL INTO [facility]

## 2022-07-06 ENCOUNTER — HOME CARE VISIT (OUTPATIENT)
Dept: HOME HEALTH SERVICES | Facility: HOME HEALTHCARE | Age: 67
End: 2022-07-06

## 2022-07-06 PROCEDURE — G0155 HHCP-SVS OF CSW,EA 15 MIN: HCPCS

## 2022-07-06 NOTE — HOME HEALTH
JEANETTE purvis on this date with patient and spouse. Patients sons Grayson and Mohamud also live in home. Family reports that behaviors related to Nancy mental illness affects family and it is desirable for Grayson to return to independent living. At the end of the visit, Grayson was transported to behavioral health unit at [facility] voluntarily. Patients spouse reports feeling overwhelmed with care of spouse and behavior of son in home however does not feel in danger. Patient also has long hx of depression and is taking medication. Denies SI/HI. Encouraged family to contact  and provided resource to handle current questions regarding financials especially related to medicaid. Also discussed mortgage and contacting [employer] company to work on getting extension due to health related expenses. Will follow up next week to determine long term plan with Grayson. Communication with Liliana CHRISTIANSON Clinical Supervisor.

## 2022-07-07 ENCOUNTER — HOME CARE VISIT (OUTPATIENT)
Dept: HOME HEALTH SERVICES | Facility: HOME HEALTHCARE | Age: 67
End: 2022-07-07

## 2022-07-07 PROCEDURE — G0299 HHS/HOSPICE OF RN EA 15 MIN: HCPCS

## 2022-07-11 VITALS
TEMPERATURE: 97.9 F | RESPIRATION RATE: 18 BRPM | DIASTOLIC BLOOD PRESSURE: 82 MMHG | SYSTOLIC BLOOD PRESSURE: 126 MMHG | HEART RATE: 67 BPM | OXYGEN SATURATION: 97 %

## 2022-07-13 NOTE — ASSESSMENT & PLAN NOTE
Patient cannot remember when and if he took which pills or insulin which is affecting his medical care significantly negatively.     Today counseled couple with spouse advocating for Carlito. It is time for Carlito to allow his spouse or anyone else he may delegate to take over monitoring, measuring and giving insulin,and medications.     He has had multiple strokes and LDL -C is dangerously high over 190 due to not knowing when and if he took his statin, lipitor. Also unclear if dose is sufficient.     DM- insulin dosing is  Absent.

## 2022-07-14 ENCOUNTER — HOME CARE VISIT (OUTPATIENT)
Dept: HOME HEALTH SERVICES | Facility: HOME HEALTHCARE | Age: 67
End: 2022-07-14

## 2022-07-17 ENCOUNTER — HOME CARE VISIT (OUTPATIENT)
Dept: HOME HEALTH SERVICES | Facility: HOME HEALTHCARE | Age: 67
End: 2022-07-17

## 2022-07-24 ENCOUNTER — APPOINTMENT (OUTPATIENT)
Dept: GENERAL RADIOLOGY | Facility: HOSPITAL | Age: 67
End: 2022-07-24

## 2022-07-24 ENCOUNTER — HOSPITAL ENCOUNTER (INPATIENT)
Facility: HOSPITAL | Age: 67
LOS: 5 days | Discharge: HOME | End: 2022-07-29
Attending: EMERGENCY MEDICINE | Admitting: INTERNAL MEDICINE

## 2022-07-24 DIAGNOSIS — N17.9 ACUTE RENAL FAILURE SUPERIMPOSED ON STAGE 4 CHRONIC KIDNEY DISEASE, UNSPECIFIED ACUTE RENAL FAILURE TYPE: Primary | ICD-10-CM

## 2022-07-24 DIAGNOSIS — R53.1 GENERALIZED WEAKNESS: ICD-10-CM

## 2022-07-24 DIAGNOSIS — I50.43 ACUTE ON CHRONIC COMBINED SYSTOLIC AND DIASTOLIC CHF (CONGESTIVE HEART FAILURE): ICD-10-CM

## 2022-07-24 DIAGNOSIS — E87.20 METABOLIC ACIDOSIS: ICD-10-CM

## 2022-07-24 DIAGNOSIS — N18.4 ACUTE RENAL FAILURE SUPERIMPOSED ON STAGE 4 CHRONIC KIDNEY DISEASE, UNSPECIFIED ACUTE RENAL FAILURE TYPE: Primary | ICD-10-CM

## 2022-07-24 LAB
ALBUMIN SERPL-MCNC: 3.9 G/DL (ref 3.5–5.2)
ALBUMIN/GLOB SERPL: 1.7 G/DL
ALP SERPL-CCNC: 77 U/L (ref 39–117)
ALT SERPL W P-5'-P-CCNC: 11 U/L (ref 1–41)
ANION GAP SERPL CALCULATED.3IONS-SCNC: 15.5 MMOL/L (ref 5–15)
AST SERPL-CCNC: 9 U/L (ref 1–40)
BACTERIA UR QL AUTO: NORMAL /HPF
BASOPHILS # BLD AUTO: 0.02 10*3/MM3 (ref 0–0.2)
BASOPHILS NFR BLD AUTO: 0.3 % (ref 0–1.5)
BILIRUB SERPL-MCNC: 0.2 MG/DL (ref 0–1.2)
BILIRUB UR QL STRIP: NEGATIVE
BUN SERPL-MCNC: 56 MG/DL (ref 8–23)
BUN/CREAT SERPL: 14.2 (ref 7–25)
CALCIUM SPEC-SCNC: 9 MG/DL (ref 8.6–10.5)
CHLORIDE SERPL-SCNC: 108 MMOL/L (ref 98–107)
CLARITY UR: CLEAR
CO2 SERPL-SCNC: 16.5 MMOL/L (ref 22–29)
COLOR UR: YELLOW
CREAT SERPL-MCNC: 3.95 MG/DL (ref 0.76–1.27)
D DIMER PPP FEU-MCNC: 1.26 MCGFEU/ML (ref 0–0.49)
DEPRECATED RDW RBC AUTO: 38.3 FL (ref 37–54)
EGFRCR SERPLBLD CKD-EPI 2021: 15.9 ML/MIN/1.73
EOSINOPHIL # BLD AUTO: 0.3 10*3/MM3 (ref 0–0.4)
EOSINOPHIL NFR BLD AUTO: 5.2 % (ref 0.3–6.2)
ERYTHROCYTE [DISTWIDTH] IN BLOOD BY AUTOMATED COUNT: 12.8 % (ref 12.3–15.4)
GLOBULIN UR ELPH-MCNC: 2.3 GM/DL
GLUCOSE SERPL-MCNC: 170 MG/DL (ref 65–99)
GLUCOSE UR STRIP-MCNC: NEGATIVE MG/DL
HCT VFR BLD AUTO: 27.1 % (ref 37.5–51)
HGB BLD-MCNC: 8.6 G/DL (ref 13–17.7)
HGB UR QL STRIP.AUTO: NEGATIVE
HYALINE CASTS UR QL AUTO: NORMAL /LPF
IMM GRANULOCYTES # BLD AUTO: 0.02 10*3/MM3 (ref 0–0.05)
IMM GRANULOCYTES NFR BLD AUTO: 0.3 % (ref 0–0.5)
INR PPP: 1.61 (ref 0.9–1.1)
KETONES UR QL STRIP: NEGATIVE
LEUKOCYTE ESTERASE UR QL STRIP.AUTO: NEGATIVE
LYMPHOCYTES # BLD AUTO: 0.88 10*3/MM3 (ref 0.7–3.1)
LYMPHOCYTES NFR BLD AUTO: 15.3 % (ref 19.6–45.3)
MCH RBC QN AUTO: 26.8 PG (ref 26.6–33)
MCHC RBC AUTO-ENTMCNC: 31.7 G/DL (ref 31.5–35.7)
MCV RBC AUTO: 84.4 FL (ref 79–97)
MONOCYTES # BLD AUTO: 0.63 10*3/MM3 (ref 0.1–0.9)
MONOCYTES NFR BLD AUTO: 10.9 % (ref 5–12)
NEUTROPHILS NFR BLD AUTO: 3.92 10*3/MM3 (ref 1.7–7)
NEUTROPHILS NFR BLD AUTO: 68 % (ref 42.7–76)
NITRITE UR QL STRIP: NEGATIVE
NRBC BLD AUTO-RTO: 0 /100 WBC (ref 0–0.2)
PH UR STRIP.AUTO: <=5 [PH] (ref 5–8)
PLATELET # BLD AUTO: 278 10*3/MM3 (ref 140–450)
PMV BLD AUTO: 10 FL (ref 6–12)
POTASSIUM SERPL-SCNC: 4.4 MMOL/L (ref 3.5–5.2)
PROT SERPL-MCNC: 6.2 G/DL (ref 6–8.5)
PROT UR QL STRIP: ABNORMAL
PROTHROMBIN TIME: 18.8 SECONDS (ref 11.7–14.2)
QT INTERVAL: 522 MS
RBC # BLD AUTO: 3.21 10*6/MM3 (ref 4.14–5.8)
RBC # UR STRIP: NORMAL /HPF
REF LAB TEST METHOD: NORMAL
SARS-COV-2 RNA PNL SPEC NAA+PROBE: NOT DETECTED
SODIUM SERPL-SCNC: 140 MMOL/L (ref 136–145)
SP GR UR STRIP: 1.01 (ref 1–1.03)
SQUAMOUS #/AREA URNS HPF: NORMAL /HPF
TROPONIN T SERPL-MCNC: 0.12 NG/ML (ref 0–0.03)
TSH SERPL DL<=0.05 MIU/L-ACNC: 3.67 UIU/ML (ref 0.27–4.2)
UROBILINOGEN UR QL STRIP: ABNORMAL
WBC # UR STRIP: NORMAL /HPF
WBC NRBC COR # BLD: 5.77 10*3/MM3 (ref 3.4–10.8)

## 2022-07-24 PROCEDURE — 71045 X-RAY EXAM CHEST 1 VIEW: CPT

## 2022-07-24 PROCEDURE — 87635 SARS-COV-2 COVID-19 AMP PRB: CPT | Performed by: EMERGENCY MEDICINE

## 2022-07-24 PROCEDURE — 84484 ASSAY OF TROPONIN QUANT: CPT | Performed by: EMERGENCY MEDICINE

## 2022-07-24 PROCEDURE — 80053 COMPREHEN METABOLIC PANEL: CPT | Performed by: EMERGENCY MEDICINE

## 2022-07-24 PROCEDURE — 25010000002 FUROSEMIDE PER 20 MG: Performed by: INTERNAL MEDICINE

## 2022-07-24 PROCEDURE — 93010 ELECTROCARDIOGRAM REPORT: CPT | Performed by: INTERNAL MEDICINE

## 2022-07-24 PROCEDURE — 84443 ASSAY THYROID STIM HORMONE: CPT | Performed by: EMERGENCY MEDICINE

## 2022-07-24 PROCEDURE — 99284 EMERGENCY DEPT VISIT MOD MDM: CPT

## 2022-07-24 PROCEDURE — 85379 FIBRIN DEGRADATION QUANT: CPT | Performed by: EMERGENCY MEDICINE

## 2022-07-24 PROCEDURE — 85025 COMPLETE CBC W/AUTO DIFF WBC: CPT | Performed by: EMERGENCY MEDICINE

## 2022-07-24 PROCEDURE — 93005 ELECTROCARDIOGRAM TRACING: CPT | Performed by: EMERGENCY MEDICINE

## 2022-07-24 PROCEDURE — 85610 PROTHROMBIN TIME: CPT | Performed by: EMERGENCY MEDICINE

## 2022-07-24 PROCEDURE — 81001 URINALYSIS AUTO W/SCOPE: CPT | Performed by: EMERGENCY MEDICINE

## 2022-07-24 RX ORDER — UREA 10 %
3 LOTION (ML) TOPICAL NIGHTLY PRN
Status: DISCONTINUED | OUTPATIENT
Start: 2022-07-24 | End: 2022-07-29 | Stop reason: HOSPADM

## 2022-07-24 RX ORDER — MULTIVIT WITH MINERALS/LUTEIN
1000 TABLET ORAL DAILY
Status: ON HOLD | COMMUNITY
End: 2023-03-09

## 2022-07-24 RX ORDER — PANTOPRAZOLE SODIUM 40 MG/1
40 TABLET, DELAYED RELEASE ORAL DAILY
Status: DISCONTINUED | OUTPATIENT
Start: 2022-07-25 | End: 2022-07-29 | Stop reason: HOSPADM

## 2022-07-24 RX ORDER — TAMSULOSIN HYDROCHLORIDE 0.4 MG/1
0.4 CAPSULE ORAL NIGHTLY
Status: DISCONTINUED | OUTPATIENT
Start: 2022-07-24 | End: 2022-07-29 | Stop reason: HOSPADM

## 2022-07-24 RX ORDER — ONDANSETRON 2 MG/ML
4 INJECTION INTRAMUSCULAR; INTRAVENOUS EVERY 6 HOURS PRN
Status: DISCONTINUED | OUTPATIENT
Start: 2022-07-24 | End: 2022-07-29 | Stop reason: HOSPADM

## 2022-07-24 RX ORDER — INSULIN LISPRO 100 [IU]/ML
0-9 INJECTION, SOLUTION INTRAVENOUS; SUBCUTANEOUS
Status: DISCONTINUED | OUTPATIENT
Start: 2022-07-25 | End: 2022-07-29 | Stop reason: HOSPADM

## 2022-07-24 RX ORDER — AMLODIPINE BESYLATE 10 MG/1
10 TABLET ORAL DAILY
COMMUNITY
End: 2022-07-29 | Stop reason: HOSPADM

## 2022-07-24 RX ORDER — NICOTINE POLACRILEX 4 MG
15 LOZENGE BUCCAL
Status: DISCONTINUED | OUTPATIENT
Start: 2022-07-24 | End: 2022-07-29 | Stop reason: HOSPADM

## 2022-07-24 RX ORDER — ASPIRIN 81 MG/1
81 TABLET ORAL DAILY
Status: DISCONTINUED | OUTPATIENT
Start: 2022-07-25 | End: 2022-07-29 | Stop reason: HOSPADM

## 2022-07-24 RX ORDER — DEXTROSE MONOHYDRATE 25 G/50ML
25 INJECTION, SOLUTION INTRAVENOUS
Status: DISCONTINUED | OUTPATIENT
Start: 2022-07-24 | End: 2022-07-29 | Stop reason: HOSPADM

## 2022-07-24 RX ORDER — ONDANSETRON 4 MG/1
4 TABLET, FILM COATED ORAL EVERY 6 HOURS PRN
Status: DISCONTINUED | OUTPATIENT
Start: 2022-07-24 | End: 2022-07-29 | Stop reason: HOSPADM

## 2022-07-24 RX ORDER — PANTOPRAZOLE SODIUM 40 MG/1
40 TABLET, DELAYED RELEASE ORAL DAILY
Status: ON HOLD | COMMUNITY
End: 2022-09-16

## 2022-07-24 RX ORDER — ATORVASTATIN CALCIUM 20 MG/1
20 TABLET, FILM COATED ORAL DAILY
Status: DISCONTINUED | OUTPATIENT
Start: 2022-07-25 | End: 2022-07-29 | Stop reason: HOSPADM

## 2022-07-24 RX ORDER — ASCORBIC ACID 500 MG
1000 TABLET ORAL DAILY
Status: DISCONTINUED | OUTPATIENT
Start: 2022-07-25 | End: 2022-07-29 | Stop reason: HOSPADM

## 2022-07-24 RX ORDER — ACETAMINOPHEN 325 MG/1
650 TABLET ORAL EVERY 4 HOURS PRN
Status: DISCONTINUED | OUTPATIENT
Start: 2022-07-24 | End: 2022-07-29 | Stop reason: HOSPADM

## 2022-07-24 RX ORDER — MELATONIN
5000 DAILY
Status: DISCONTINUED | OUTPATIENT
Start: 2022-07-25 | End: 2022-07-29 | Stop reason: HOSPADM

## 2022-07-24 RX ORDER — MEMANTINE HYDROCHLORIDE 5 MG/1
5 TABLET ORAL 2 TIMES DAILY
Status: DISCONTINUED | OUTPATIENT
Start: 2022-07-24 | End: 2022-07-29 | Stop reason: HOSPADM

## 2022-07-24 RX ORDER — NITROGLYCERIN 0.4 MG/1
0.4 TABLET SUBLINGUAL
Status: DISCONTINUED | OUTPATIENT
Start: 2022-07-24 | End: 2022-07-29 | Stop reason: HOSPADM

## 2022-07-24 RX ORDER — LEVOTHYROXINE SODIUM 0.07 MG/1
75 TABLET ORAL
Status: DISCONTINUED | OUTPATIENT
Start: 2022-07-25 | End: 2022-07-29 | Stop reason: HOSPADM

## 2022-07-24 RX ADMIN — MEMANTINE HYDROCHLORIDE 5 MG: 5 TABLET, FILM COATED ORAL at 23:39

## 2022-07-24 RX ADMIN — FUROSEMIDE 120 MG: 10 INJECTION, SOLUTION INTRAMUSCULAR; INTRAVENOUS at 19:44

## 2022-07-24 RX ADMIN — TAMSULOSIN HYDROCHLORIDE 0.4 MG: 0.4 CAPSULE ORAL at 23:39

## 2022-07-25 PROBLEM — Z79.01 CHRONIC ANTICOAGULATION: Status: ACTIVE | Noted: 2022-07-25

## 2022-07-25 PROBLEM — I48.0 PAROXYSMAL ATRIAL FIBRILLATION: Status: ACTIVE | Noted: 2022-07-25

## 2022-07-25 LAB
ALBUMIN SERPL-MCNC: 3.8 G/DL (ref 3.5–5.2)
ANION GAP SERPL CALCULATED.3IONS-SCNC: 12.6 MMOL/L (ref 5–15)
ANION GAP SERPL CALCULATED.3IONS-SCNC: 13 MMOL/L (ref 5–15)
BASOPHILS # BLD AUTO: 0.01 10*3/MM3 (ref 0–0.2)
BASOPHILS NFR BLD AUTO: 0.2 % (ref 0–1.5)
BUN SERPL-MCNC: 57 MG/DL (ref 8–23)
BUN SERPL-MCNC: 59 MG/DL (ref 8–23)
BUN/CREAT SERPL: 14.4 (ref 7–25)
BUN/CREAT SERPL: 15 (ref 7–25)
CALCIUM SPEC-SCNC: 8.7 MG/DL (ref 8.6–10.5)
CALCIUM SPEC-SCNC: 9 MG/DL (ref 8.6–10.5)
CHLORIDE SERPL-SCNC: 107 MMOL/L (ref 98–107)
CHLORIDE SERPL-SCNC: 110 MMOL/L (ref 98–107)
CO2 SERPL-SCNC: 18.4 MMOL/L (ref 22–29)
CO2 SERPL-SCNC: 19 MMOL/L (ref 22–29)
CREAT SERPL-MCNC: 3.93 MG/DL (ref 0.76–1.27)
CREAT SERPL-MCNC: 3.96 MG/DL (ref 0.76–1.27)
DEPRECATED RDW RBC AUTO: 38 FL (ref 37–54)
EGFRCR SERPLBLD CKD-EPI 2021: 15.8 ML/MIN/1.73
EGFRCR SERPLBLD CKD-EPI 2021: 16 ML/MIN/1.73
EOSINOPHIL # BLD AUTO: 0.32 10*3/MM3 (ref 0–0.4)
EOSINOPHIL NFR BLD AUTO: 5.6 % (ref 0.3–6.2)
ERYTHROCYTE [DISTWIDTH] IN BLOOD BY AUTOMATED COUNT: 12.6 % (ref 12.3–15.4)
FERRITIN SERPL-MCNC: 228 NG/ML (ref 30–400)
GLUCOSE BLDC GLUCOMTR-MCNC: 153 MG/DL (ref 70–130)
GLUCOSE BLDC GLUCOMTR-MCNC: 187 MG/DL (ref 70–130)
GLUCOSE BLDC GLUCOMTR-MCNC: 65 MG/DL (ref 70–130)
GLUCOSE BLDC GLUCOMTR-MCNC: 85 MG/DL (ref 70–130)
GLUCOSE SERPL-MCNC: 132 MG/DL (ref 65–99)
GLUCOSE SERPL-MCNC: 135 MG/DL (ref 65–99)
HBA1C MFR BLD: 7.3 % (ref 4.8–5.6)
HBV SURFACE AG SERPL QL IA: NORMAL
HCT VFR BLD AUTO: 26.9 % (ref 37.5–51)
HGB BLD-MCNC: 8.6 G/DL (ref 13–17.7)
IMM GRANULOCYTES # BLD AUTO: 0.02 10*3/MM3 (ref 0–0.05)
IMM GRANULOCYTES NFR BLD AUTO: 0.3 % (ref 0–0.5)
IRON 24H UR-MRATE: 26 MCG/DL (ref 59–158)
IRON SATN MFR SERPL: 9 % (ref 20–50)
LYMPHOCYTES # BLD AUTO: 0.85 10*3/MM3 (ref 0.7–3.1)
LYMPHOCYTES NFR BLD AUTO: 14.9 % (ref 19.6–45.3)
MCH RBC QN AUTO: 26.4 PG (ref 26.6–33)
MCHC RBC AUTO-ENTMCNC: 32 G/DL (ref 31.5–35.7)
MCV RBC AUTO: 82.5 FL (ref 79–97)
MONOCYTES # BLD AUTO: 0.58 10*3/MM3 (ref 0.1–0.9)
MONOCYTES NFR BLD AUTO: 10.1 % (ref 5–12)
NEUTROPHILS NFR BLD AUTO: 3.94 10*3/MM3 (ref 1.7–7)
NEUTROPHILS NFR BLD AUTO: 68.9 % (ref 42.7–76)
NRBC BLD AUTO-RTO: 0 /100 WBC (ref 0–0.2)
PHOSPHATE SERPL-MCNC: 4.3 MG/DL (ref 2.5–4.5)
PLATELET # BLD AUTO: 283 10*3/MM3 (ref 140–450)
PMV BLD AUTO: 10.3 FL (ref 6–12)
POTASSIUM SERPL-SCNC: 4 MMOL/L (ref 3.5–5.2)
POTASSIUM SERPL-SCNC: 4.1 MMOL/L (ref 3.5–5.2)
RBC # BLD AUTO: 3.26 10*6/MM3 (ref 4.14–5.8)
SODIUM SERPL-SCNC: 138 MMOL/L (ref 136–145)
SODIUM SERPL-SCNC: 142 MMOL/L (ref 136–145)
TIBC SERPL-MCNC: 304 MCG/DL (ref 298–536)
TRANSFERRIN SERPL-MCNC: 204 MG/DL (ref 200–360)
WBC NRBC COR # BLD: 5.72 10*3/MM3 (ref 3.4–10.8)

## 2022-07-25 PROCEDURE — 82728 ASSAY OF FERRITIN: CPT | Performed by: INTERNAL MEDICINE

## 2022-07-25 PROCEDURE — 25010000002 FUROSEMIDE PER 20 MG: Performed by: INTERNAL MEDICINE

## 2022-07-25 PROCEDURE — 80069 RENAL FUNCTION PANEL: CPT | Performed by: INTERNAL MEDICINE

## 2022-07-25 PROCEDURE — 97110 THERAPEUTIC EXERCISES: CPT

## 2022-07-25 PROCEDURE — 82962 GLUCOSE BLOOD TEST: CPT

## 2022-07-25 PROCEDURE — 85025 COMPLETE CBC W/AUTO DIFF WBC: CPT | Performed by: INTERNAL MEDICINE

## 2022-07-25 PROCEDURE — 80048 BASIC METABOLIC PNL TOTAL CA: CPT | Performed by: INTERNAL MEDICINE

## 2022-07-25 PROCEDURE — 83540 ASSAY OF IRON: CPT | Performed by: INTERNAL MEDICINE

## 2022-07-25 PROCEDURE — 97535 SELF CARE MNGMENT TRAINING: CPT

## 2022-07-25 PROCEDURE — 97162 PT EVAL MOD COMPLEX 30 MIN: CPT

## 2022-07-25 PROCEDURE — 87340 HEPATITIS B SURFACE AG IA: CPT | Performed by: INTERNAL MEDICINE

## 2022-07-25 PROCEDURE — 97166 OT EVAL MOD COMPLEX 45 MIN: CPT

## 2022-07-25 PROCEDURE — 36415 COLL VENOUS BLD VENIPUNCTURE: CPT | Performed by: INTERNAL MEDICINE

## 2022-07-25 PROCEDURE — 63710000001 INSULIN LISPRO (HUMAN) PER 5 UNITS: Performed by: INTERNAL MEDICINE

## 2022-07-25 PROCEDURE — 83036 HEMOGLOBIN GLYCOSYLATED A1C: CPT | Performed by: INTERNAL MEDICINE

## 2022-07-25 PROCEDURE — 84466 ASSAY OF TRANSFERRIN: CPT | Performed by: INTERNAL MEDICINE

## 2022-07-25 RX ORDER — SODIUM BICARBONATE 650 MG/1
650 TABLET ORAL 3 TIMES DAILY
Status: DISCONTINUED | OUTPATIENT
Start: 2022-07-25 | End: 2022-07-29

## 2022-07-25 RX ADMIN — PANTOPRAZOLE SODIUM 40 MG: 40 TABLET, DELAYED RELEASE ORAL at 08:32

## 2022-07-25 RX ADMIN — FUROSEMIDE 120 MG: 10 INJECTION, SOLUTION INTRAMUSCULAR; INTRAVENOUS at 00:54

## 2022-07-25 RX ADMIN — ATORVASTATIN CALCIUM 20 MG: 20 TABLET, FILM COATED ORAL at 08:31

## 2022-07-25 RX ADMIN — SODIUM BICARBONATE 650 MG: 650 TABLET ORAL at 17:11

## 2022-07-25 RX ADMIN — INSULIN LISPRO 2 UNITS: 100 INJECTION, SOLUTION INTRAVENOUS; SUBCUTANEOUS at 17:11

## 2022-07-25 RX ADMIN — MEMANTINE HYDROCHLORIDE 5 MG: 5 TABLET, FILM COATED ORAL at 08:31

## 2022-07-25 RX ADMIN — FUROSEMIDE 120 MG: 10 INJECTION, SOLUTION INTRAMUSCULAR; INTRAVENOUS at 06:58

## 2022-07-25 RX ADMIN — SODIUM BICARBONATE 650 MG: 650 TABLET ORAL at 21:38

## 2022-07-25 RX ADMIN — OXYCODONE HYDROCHLORIDE AND ACETAMINOPHEN 1000 MG: 500 TABLET ORAL at 08:31

## 2022-07-25 RX ADMIN — ASPIRIN 81 MG: 81 TABLET, COATED ORAL at 08:31

## 2022-07-25 RX ADMIN — LEVOTHYROXINE SODIUM 75 MCG: 0.07 TABLET ORAL at 06:58

## 2022-07-25 RX ADMIN — Medication 5000 UNITS: at 08:31

## 2022-07-25 RX ADMIN — INSULIN LISPRO 2 UNITS: 100 INJECTION, SOLUTION INTRAVENOUS; SUBCUTANEOUS at 08:32

## 2022-07-25 RX ADMIN — MEMANTINE HYDROCHLORIDE 5 MG: 5 TABLET, FILM COATED ORAL at 21:38

## 2022-07-25 RX ADMIN — FUROSEMIDE 120 MG: 10 INJECTION, SOLUTION INTRAMUSCULAR; INTRAVENOUS at 17:11

## 2022-07-25 RX ADMIN — TAMSULOSIN HYDROCHLORIDE 0.4 MG: 0.4 CAPSULE ORAL at 21:38

## 2022-07-25 RX ADMIN — FUROSEMIDE 120 MG: 10 INJECTION, SOLUTION INTRAMUSCULAR; INTRAVENOUS at 11:12

## 2022-07-26 LAB
ALBUMIN SERPL-MCNC: 3.5 G/DL (ref 3.5–5.2)
ANION GAP SERPL CALCULATED.3IONS-SCNC: 12 MMOL/L (ref 5–15)
BASOPHILS # BLD AUTO: 0.03 10*3/MM3 (ref 0–0.2)
BASOPHILS NFR BLD AUTO: 0.5 % (ref 0–1.5)
BUN SERPL-MCNC: 59 MG/DL (ref 8–23)
BUN/CREAT SERPL: 14.8 (ref 7–25)
CALCIUM SPEC-SCNC: 8.6 MG/DL (ref 8.6–10.5)
CHLORIDE SERPL-SCNC: 109 MMOL/L (ref 98–107)
CO2 SERPL-SCNC: 21 MMOL/L (ref 22–29)
CREAT SERPL-MCNC: 3.98 MG/DL (ref 0.76–1.27)
DEPRECATED RDW RBC AUTO: 39 FL (ref 37–54)
EGFRCR SERPLBLD CKD-EPI 2021: 15.7 ML/MIN/1.73
EOSINOPHIL # BLD AUTO: 0.34 10*3/MM3 (ref 0–0.4)
EOSINOPHIL NFR BLD AUTO: 5.5 % (ref 0.3–6.2)
ERYTHROCYTE [DISTWIDTH] IN BLOOD BY AUTOMATED COUNT: 12.9 % (ref 12.3–15.4)
GLUCOSE BLDC GLUCOMTR-MCNC: 109 MG/DL (ref 70–130)
GLUCOSE BLDC GLUCOMTR-MCNC: 137 MG/DL (ref 70–130)
GLUCOSE BLDC GLUCOMTR-MCNC: 194 MG/DL (ref 70–130)
GLUCOSE BLDC GLUCOMTR-MCNC: 225 MG/DL (ref 70–130)
GLUCOSE SERPL-MCNC: 100 MG/DL (ref 65–99)
HCT VFR BLD AUTO: 26.7 % (ref 37.5–51)
HGB BLD-MCNC: 8.3 G/DL (ref 13–17.7)
IMM GRANULOCYTES # BLD AUTO: 0.01 10*3/MM3 (ref 0–0.05)
IMM GRANULOCYTES NFR BLD AUTO: 0.2 % (ref 0–0.5)
LYMPHOCYTES # BLD AUTO: 0.96 10*3/MM3 (ref 0.7–3.1)
LYMPHOCYTES NFR BLD AUTO: 15.5 % (ref 19.6–45.3)
MCH RBC QN AUTO: 26.2 PG (ref 26.6–33)
MCHC RBC AUTO-ENTMCNC: 31.1 G/DL (ref 31.5–35.7)
MCV RBC AUTO: 84.2 FL (ref 79–97)
MONOCYTES # BLD AUTO: 0.69 10*3/MM3 (ref 0.1–0.9)
MONOCYTES NFR BLD AUTO: 11.1 % (ref 5–12)
NEUTROPHILS NFR BLD AUTO: 4.16 10*3/MM3 (ref 1.7–7)
NEUTROPHILS NFR BLD AUTO: 67.2 % (ref 42.7–76)
NRBC BLD AUTO-RTO: 0 /100 WBC (ref 0–0.2)
PHOSPHATE SERPL-MCNC: 4.3 MG/DL (ref 2.5–4.5)
PLATELET # BLD AUTO: 270 10*3/MM3 (ref 140–450)
PMV BLD AUTO: 10.1 FL (ref 6–12)
POTASSIUM SERPL-SCNC: 3.9 MMOL/L (ref 3.5–5.2)
RBC # BLD AUTO: 3.17 10*6/MM3 (ref 4.14–5.8)
SODIUM SERPL-SCNC: 142 MMOL/L (ref 136–145)
WBC NRBC COR # BLD: 6.19 10*3/MM3 (ref 3.4–10.8)

## 2022-07-26 PROCEDURE — 63710000001 INSULIN LISPRO (HUMAN) PER 5 UNITS: Performed by: INTERNAL MEDICINE

## 2022-07-26 PROCEDURE — 25010000002 FUROSEMIDE PER 20 MG: Performed by: INTERNAL MEDICINE

## 2022-07-26 PROCEDURE — 80069 RENAL FUNCTION PANEL: CPT | Performed by: INTERNAL MEDICINE

## 2022-07-26 PROCEDURE — 82962 GLUCOSE BLOOD TEST: CPT

## 2022-07-26 PROCEDURE — 85025 COMPLETE CBC W/AUTO DIFF WBC: CPT | Performed by: INTERNAL MEDICINE

## 2022-07-26 RX ADMIN — Medication 5000 UNITS: at 08:39

## 2022-07-26 RX ADMIN — INSULIN LISPRO 4 UNITS: 100 INJECTION, SOLUTION INTRAVENOUS; SUBCUTANEOUS at 12:01

## 2022-07-26 RX ADMIN — MEMANTINE HYDROCHLORIDE 5 MG: 5 TABLET, FILM COATED ORAL at 08:39

## 2022-07-26 RX ADMIN — OXYCODONE HYDROCHLORIDE AND ACETAMINOPHEN 1000 MG: 500 TABLET ORAL at 08:39

## 2022-07-26 RX ADMIN — FUROSEMIDE 120 MG: 10 INJECTION, SOLUTION INTRAMUSCULAR; INTRAVENOUS at 08:46

## 2022-07-26 RX ADMIN — PANTOPRAZOLE SODIUM 40 MG: 40 TABLET, DELAYED RELEASE ORAL at 08:39

## 2022-07-26 RX ADMIN — SODIUM BICARBONATE 650 MG: 650 TABLET ORAL at 16:34

## 2022-07-26 RX ADMIN — FUROSEMIDE 120 MG: 10 INJECTION, SOLUTION INTRAMUSCULAR; INTRAVENOUS at 00:55

## 2022-07-26 RX ADMIN — SODIUM BICARBONATE 650 MG: 650 TABLET ORAL at 20:20

## 2022-07-26 RX ADMIN — TAMSULOSIN HYDROCHLORIDE 0.4 MG: 0.4 CAPSULE ORAL at 20:20

## 2022-07-26 RX ADMIN — ASPIRIN 81 MG: 81 TABLET, COATED ORAL at 08:40

## 2022-07-26 RX ADMIN — MEMANTINE HYDROCHLORIDE 5 MG: 5 TABLET, FILM COATED ORAL at 20:20

## 2022-07-26 RX ADMIN — LEVOTHYROXINE SODIUM 75 MCG: 0.07 TABLET ORAL at 06:58

## 2022-07-26 RX ADMIN — ATORVASTATIN CALCIUM 20 MG: 20 TABLET, FILM COATED ORAL at 08:39

## 2022-07-26 RX ADMIN — SODIUM BICARBONATE 650 MG: 650 TABLET ORAL at 08:43

## 2022-07-27 LAB
ALBUMIN SERPL-MCNC: 3.6 G/DL (ref 3.5–5.2)
ANION GAP SERPL CALCULATED.3IONS-SCNC: 13.4 MMOL/L (ref 5–15)
BUN SERPL-MCNC: 56 MG/DL (ref 8–23)
BUN/CREAT SERPL: 14 (ref 7–25)
CALCIUM SPEC-SCNC: 8.8 MG/DL (ref 8.6–10.5)
CHLORIDE SERPL-SCNC: 106 MMOL/L (ref 98–107)
CO2 SERPL-SCNC: 22.6 MMOL/L (ref 22–29)
CREAT SERPL-MCNC: 4.01 MG/DL (ref 0.76–1.27)
EGFRCR SERPLBLD CKD-EPI 2021: 15.6 ML/MIN/1.73
GLUCOSE BLDC GLUCOMTR-MCNC: 127 MG/DL (ref 70–130)
GLUCOSE BLDC GLUCOMTR-MCNC: 129 MG/DL (ref 70–130)
GLUCOSE BLDC GLUCOMTR-MCNC: 179 MG/DL (ref 70–130)
GLUCOSE BLDC GLUCOMTR-MCNC: 236 MG/DL (ref 70–130)
GLUCOSE SERPL-MCNC: 171 MG/DL (ref 65–99)
PHOSPHATE SERPL-MCNC: 4 MG/DL (ref 2.5–4.5)
POTASSIUM SERPL-SCNC: 3.8 MMOL/L (ref 3.5–5.2)
SODIUM SERPL-SCNC: 142 MMOL/L (ref 136–145)

## 2022-07-27 PROCEDURE — 80069 RENAL FUNCTION PANEL: CPT | Performed by: INTERNAL MEDICINE

## 2022-07-27 PROCEDURE — 82962 GLUCOSE BLOOD TEST: CPT

## 2022-07-27 PROCEDURE — 25010000002 FUROSEMIDE PER 20 MG: Performed by: INTERNAL MEDICINE

## 2022-07-27 PROCEDURE — 63710000001 INSULIN LISPRO (HUMAN) PER 5 UNITS: Performed by: INTERNAL MEDICINE

## 2022-07-27 PROCEDURE — 97110 THERAPEUTIC EXERCISES: CPT

## 2022-07-27 RX ORDER — FUROSEMIDE 10 MG/ML
80 INJECTION INTRAMUSCULAR; INTRAVENOUS EVERY 8 HOURS
Status: COMPLETED | OUTPATIENT
Start: 2022-07-27 | End: 2022-07-28

## 2022-07-27 RX ADMIN — MEMANTINE HYDROCHLORIDE 5 MG: 5 TABLET, FILM COATED ORAL at 20:48

## 2022-07-27 RX ADMIN — ASPIRIN 81 MG: 81 TABLET, COATED ORAL at 09:14

## 2022-07-27 RX ADMIN — OXYCODONE HYDROCHLORIDE AND ACETAMINOPHEN 1000 MG: 500 TABLET ORAL at 09:11

## 2022-07-27 RX ADMIN — Medication 5000 UNITS: at 09:11

## 2022-07-27 RX ADMIN — INSULIN LISPRO 2 UNITS: 100 INJECTION, SOLUTION INTRAVENOUS; SUBCUTANEOUS at 17:01

## 2022-07-27 RX ADMIN — INSULIN LISPRO 2 UNITS: 100 INJECTION, SOLUTION INTRAVENOUS; SUBCUTANEOUS at 12:30

## 2022-07-27 RX ADMIN — SODIUM BICARBONATE 650 MG: 650 TABLET ORAL at 20:48

## 2022-07-27 RX ADMIN — TAMSULOSIN HYDROCHLORIDE 0.4 MG: 0.4 CAPSULE ORAL at 20:48

## 2022-07-27 RX ADMIN — FUROSEMIDE 80 MG: 10 INJECTION, SOLUTION INTRAMUSCULAR; INTRAVENOUS at 09:11

## 2022-07-27 RX ADMIN — PANTOPRAZOLE SODIUM 40 MG: 40 TABLET, DELAYED RELEASE ORAL at 09:16

## 2022-07-27 RX ADMIN — MEMANTINE HYDROCHLORIDE 5 MG: 5 TABLET, FILM COATED ORAL at 09:11

## 2022-07-27 RX ADMIN — FUROSEMIDE 80 MG: 10 INJECTION, SOLUTION INTRAMUSCULAR; INTRAVENOUS at 17:01

## 2022-07-27 RX ADMIN — LEVOTHYROXINE SODIUM 75 MCG: 0.07 TABLET ORAL at 07:15

## 2022-07-27 RX ADMIN — SODIUM BICARBONATE 650 MG: 650 TABLET ORAL at 09:11

## 2022-07-27 RX ADMIN — ATORVASTATIN CALCIUM 20 MG: 20 TABLET, FILM COATED ORAL at 09:14

## 2022-07-27 RX ADMIN — SODIUM BICARBONATE 650 MG: 650 TABLET ORAL at 17:01

## 2022-07-28 LAB
ALBUMIN SERPL-MCNC: 3.7 G/DL (ref 3.5–5.2)
ANION GAP SERPL CALCULATED.3IONS-SCNC: 15.4 MMOL/L (ref 5–15)
BUN SERPL-MCNC: 60 MG/DL (ref 8–23)
BUN/CREAT SERPL: 16.7 (ref 7–25)
CALCIUM SPEC-SCNC: 8.6 MG/DL (ref 8.6–10.5)
CHLORIDE SERPL-SCNC: 102 MMOL/L (ref 98–107)
CO2 SERPL-SCNC: 25.6 MMOL/L (ref 22–29)
CREAT SERPL-MCNC: 3.59 MG/DL (ref 0.76–1.27)
EGFRCR SERPLBLD CKD-EPI 2021: 17.8 ML/MIN/1.73
GLUCOSE BLDC GLUCOMTR-MCNC: 114 MG/DL (ref 70–130)
GLUCOSE BLDC GLUCOMTR-MCNC: 123 MG/DL (ref 70–130)
GLUCOSE BLDC GLUCOMTR-MCNC: 221 MG/DL (ref 70–130)
GLUCOSE BLDC GLUCOMTR-MCNC: 235 MG/DL (ref 70–130)
GLUCOSE SERPL-MCNC: 204 MG/DL (ref 65–99)
PHOSPHATE SERPL-MCNC: 4.4 MG/DL (ref 2.5–4.5)
POTASSIUM SERPL-SCNC: 4 MMOL/L (ref 3.5–5.2)
SODIUM SERPL-SCNC: 143 MMOL/L (ref 136–145)

## 2022-07-28 PROCEDURE — 25010000002 FUROSEMIDE PER 20 MG: Performed by: INTERNAL MEDICINE

## 2022-07-28 PROCEDURE — 97110 THERAPEUTIC EXERCISES: CPT

## 2022-07-28 PROCEDURE — 82962 GLUCOSE BLOOD TEST: CPT

## 2022-07-28 PROCEDURE — 63710000001 INSULIN LISPRO (HUMAN) PER 5 UNITS: Performed by: INTERNAL MEDICINE

## 2022-07-28 PROCEDURE — 97535 SELF CARE MNGMENT TRAINING: CPT

## 2022-07-28 PROCEDURE — 80069 RENAL FUNCTION PANEL: CPT | Performed by: INTERNAL MEDICINE

## 2022-07-28 RX ORDER — TORSEMIDE 20 MG/1
60 TABLET ORAL
Status: DISCONTINUED | OUTPATIENT
Start: 2022-07-28 | End: 2022-07-29

## 2022-07-28 RX ADMIN — INSULIN LISPRO 4 UNITS: 100 INJECTION, SOLUTION INTRAVENOUS; SUBCUTANEOUS at 11:28

## 2022-07-28 RX ADMIN — TAMSULOSIN HYDROCHLORIDE 0.4 MG: 0.4 CAPSULE ORAL at 20:53

## 2022-07-28 RX ADMIN — LEVOTHYROXINE SODIUM 75 MCG: 0.07 TABLET ORAL at 06:22

## 2022-07-28 RX ADMIN — MEMANTINE HYDROCHLORIDE 5 MG: 5 TABLET, FILM COATED ORAL at 20:53

## 2022-07-28 RX ADMIN — OXYCODONE HYDROCHLORIDE AND ACETAMINOPHEN 1000 MG: 500 TABLET ORAL at 08:17

## 2022-07-28 RX ADMIN — ASPIRIN 81 MG: 81 TABLET, COATED ORAL at 08:17

## 2022-07-28 RX ADMIN — APIXABAN 2.5 MG: 2.5 TABLET, FILM COATED ORAL at 20:53

## 2022-07-28 RX ADMIN — SODIUM BICARBONATE 650 MG: 650 TABLET ORAL at 20:53

## 2022-07-28 RX ADMIN — TORSEMIDE 60 MG: 20 TABLET ORAL at 16:10

## 2022-07-28 RX ADMIN — Medication 5000 UNITS: at 08:17

## 2022-07-28 RX ADMIN — FUROSEMIDE 80 MG: 10 INJECTION, SOLUTION INTRAMUSCULAR; INTRAVENOUS at 00:09

## 2022-07-28 RX ADMIN — SODIUM BICARBONATE 650 MG: 650 TABLET ORAL at 08:17

## 2022-07-28 RX ADMIN — FUROSEMIDE 80 MG: 10 INJECTION, SOLUTION INTRAMUSCULAR; INTRAVENOUS at 08:18

## 2022-07-28 RX ADMIN — SODIUM BICARBONATE 650 MG: 650 TABLET ORAL at 16:10

## 2022-07-28 RX ADMIN — MEMANTINE HYDROCHLORIDE 5 MG: 5 TABLET, FILM COATED ORAL at 08:17

## 2022-07-28 RX ADMIN — ATORVASTATIN CALCIUM 20 MG: 20 TABLET, FILM COATED ORAL at 08:17

## 2022-07-28 RX ADMIN — PANTOPRAZOLE SODIUM 40 MG: 40 TABLET, DELAYED RELEASE ORAL at 08:18

## 2022-07-29 ENCOUNTER — READMISSION MANAGEMENT (OUTPATIENT)
Dept: CALL CENTER | Facility: HOSPITAL | Age: 67
End: 2022-07-29

## 2022-07-29 VITALS
DIASTOLIC BLOOD PRESSURE: 73 MMHG | RESPIRATION RATE: 16 BRPM | TEMPERATURE: 98.4 F | HEIGHT: 72 IN | HEART RATE: 67 BPM | BODY MASS INDEX: 22.28 KG/M2 | WEIGHT: 164.5 LBS | SYSTOLIC BLOOD PRESSURE: 137 MMHG | OXYGEN SATURATION: 92 %

## 2022-07-29 LAB
ALBUMIN SERPL-MCNC: 3.8 G/DL (ref 3.5–5.2)
ANION GAP SERPL CALCULATED.3IONS-SCNC: 10.6 MMOL/L (ref 5–15)
BASOPHILS # BLD AUTO: 0.02 10*3/MM3 (ref 0–0.2)
BASOPHILS NFR BLD AUTO: 0.3 % (ref 0–1.5)
BUN SERPL-MCNC: 58 MG/DL (ref 8–23)
BUN/CREAT SERPL: 16.7 (ref 7–25)
CALCIUM SPEC-SCNC: 9 MG/DL (ref 8.6–10.5)
CHLORIDE SERPL-SCNC: 97 MMOL/L (ref 98–107)
CO2 SERPL-SCNC: 30.4 MMOL/L (ref 22–29)
CREAT SERPL-MCNC: 3.48 MG/DL (ref 0.76–1.27)
DEPRECATED RDW RBC AUTO: 38.6 FL (ref 37–54)
EGFRCR SERPLBLD CKD-EPI 2021: 18.5 ML/MIN/1.73
EOSINOPHIL # BLD AUTO: 0.34 10*3/MM3 (ref 0–0.4)
EOSINOPHIL NFR BLD AUTO: 4.6 % (ref 0.3–6.2)
ERYTHROCYTE [DISTWIDTH] IN BLOOD BY AUTOMATED COUNT: 12.9 % (ref 12.3–15.4)
GLUCOSE BLDC GLUCOMTR-MCNC: 128 MG/DL (ref 70–130)
GLUCOSE BLDC GLUCOMTR-MCNC: 186 MG/DL (ref 70–130)
GLUCOSE SERPL-MCNC: 121 MG/DL (ref 65–99)
HCT VFR BLD AUTO: 31.4 % (ref 37.5–51)
HGB BLD-MCNC: 9.9 G/DL (ref 13–17.7)
IMM GRANULOCYTES # BLD AUTO: 0.02 10*3/MM3 (ref 0–0.05)
IMM GRANULOCYTES NFR BLD AUTO: 0.3 % (ref 0–0.5)
LYMPHOCYTES # BLD AUTO: 1.11 10*3/MM3 (ref 0.7–3.1)
LYMPHOCYTES NFR BLD AUTO: 15.1 % (ref 19.6–45.3)
MCH RBC QN AUTO: 26.2 PG (ref 26.6–33)
MCHC RBC AUTO-ENTMCNC: 31.5 G/DL (ref 31.5–35.7)
MCV RBC AUTO: 83.1 FL (ref 79–97)
MONOCYTES # BLD AUTO: 0.99 10*3/MM3 (ref 0.1–0.9)
MONOCYTES NFR BLD AUTO: 13.5 % (ref 5–12)
NEUTROPHILS NFR BLD AUTO: 4.88 10*3/MM3 (ref 1.7–7)
NEUTROPHILS NFR BLD AUTO: 66.2 % (ref 42.7–76)
NRBC BLD AUTO-RTO: 0 /100 WBC (ref 0–0.2)
PHOSPHATE SERPL-MCNC: 4.7 MG/DL (ref 2.5–4.5)
PLATELET # BLD AUTO: 291 10*3/MM3 (ref 140–450)
PMV BLD AUTO: 10.1 FL (ref 6–12)
POTASSIUM SERPL-SCNC: 4.2 MMOL/L (ref 3.5–5.2)
RBC # BLD AUTO: 3.78 10*6/MM3 (ref 4.14–5.8)
SODIUM SERPL-SCNC: 138 MMOL/L (ref 136–145)
WBC NRBC COR # BLD: 7.36 10*3/MM3 (ref 3.4–10.8)

## 2022-07-29 PROCEDURE — 82962 GLUCOSE BLOOD TEST: CPT

## 2022-07-29 PROCEDURE — 80069 RENAL FUNCTION PANEL: CPT | Performed by: INTERNAL MEDICINE

## 2022-07-29 PROCEDURE — 85025 COMPLETE CBC W/AUTO DIFF WBC: CPT | Performed by: HOSPITALIST

## 2022-07-29 PROCEDURE — 97530 THERAPEUTIC ACTIVITIES: CPT

## 2022-07-29 PROCEDURE — 63710000001 INSULIN LISPRO (HUMAN) PER 5 UNITS: Performed by: INTERNAL MEDICINE

## 2022-07-29 RX ORDER — TORSEMIDE 100 MG/1
100 TABLET ORAL DAILY
Qty: 30 TABLET | Refills: 0 | Status: SHIPPED | OUTPATIENT
Start: 2022-07-30 | End: 2022-09-19 | Stop reason: HOSPADM

## 2022-07-29 RX ORDER — TORSEMIDE 100 MG/1
100 TABLET ORAL DAILY
Status: DISCONTINUED | OUTPATIENT
Start: 2022-07-30 | End: 2022-07-29 | Stop reason: HOSPADM

## 2022-07-29 RX ADMIN — SODIUM BICARBONATE 650 MG: 650 TABLET ORAL at 08:41

## 2022-07-29 RX ADMIN — APIXABAN 2.5 MG: 2.5 TABLET, FILM COATED ORAL at 08:40

## 2022-07-29 RX ADMIN — PANTOPRAZOLE SODIUM 40 MG: 40 TABLET, DELAYED RELEASE ORAL at 08:42

## 2022-07-29 RX ADMIN — ATORVASTATIN CALCIUM 20 MG: 20 TABLET, FILM COATED ORAL at 08:42

## 2022-07-29 RX ADMIN — OXYCODONE HYDROCHLORIDE AND ACETAMINOPHEN 1000 MG: 500 TABLET ORAL at 08:41

## 2022-07-29 RX ADMIN — TORSEMIDE 60 MG: 20 TABLET ORAL at 08:40

## 2022-07-29 RX ADMIN — LEVOTHYROXINE SODIUM 75 MCG: 0.07 TABLET ORAL at 06:16

## 2022-07-29 RX ADMIN — Medication 5000 UNITS: at 08:41

## 2022-07-29 RX ADMIN — INSULIN LISPRO 2 UNITS: 100 INJECTION, SOLUTION INTRAVENOUS; SUBCUTANEOUS at 12:20

## 2022-07-29 RX ADMIN — ASPIRIN 81 MG: 81 TABLET, COATED ORAL at 08:42

## 2022-07-29 RX ADMIN — MEMANTINE HYDROCHLORIDE 5 MG: 5 TABLET, FILM COATED ORAL at 08:41

## 2022-07-30 NOTE — OUTREACH NOTE
Prep Survey    Flowsheet Row Responses   [facility]? [place]   Is LACE score < 7 ? No   Emergency Room discharge w/ pulse ox? No   Eligibility Readm Mgmt   Discharge diagnosis Acute on chronic combined systolic and diastolic congestive heart failure    Does the patient have one of the following disease processes/diagnoses(primary or secondary)? CHF   Does the patient have Home health ordered? No   Is there a DME ordered? No   Prep survey completed? Yes          MALCOLM JONES - Registered Nurse

## 2022-08-03 ENCOUNTER — READMISSION MANAGEMENT (OUTPATIENT)
Dept: CALL CENTER | Facility: HOSPITAL | Age: 67
End: 2022-08-03

## 2022-08-03 NOTE — OUTREACH NOTE
CHF Week 1 Survey    Flowsheet Row Responses   [facility] patient discharged from? [place]   Does the patient have one of the following disease processes/diagnoses(primary or secondary)? CHF   CHF Week 1 attempt successful? Yes   Call start time 1139   Call end time 1145   Discharge diagnosis Acute on chronic combined systolic and diastolic congestive heart failure    Is patient permission given to speak with other caregiver? Yes   List who call center can speak with wife   Meds reviewed with patient/caregiver? Yes   Is the patient having any side effects they believe may be caused by any medication additions or changes? No   Prescription comments Pt is unsure if he picked up new Torsemide med from pharm. Will check at home later. Unsure if he was aware of med changes, will refer to AVS at home, he reports.    Does the patient have a primary care provider?  Yes   Does the patient have an appointment with their PCP within 7 days of discharge? Yes   Has the patient kept scheduled appointments due by today? N/A   DME comments has it at home but does not wear   Pulse Ox monitoring None   Psychosocial issues? No   Comments Pt reports still having fatigue but SOA & edema improved.   Did the patient receive a copy of their discharge instructions? Yes   Nursing interventions Reviewed instructions with patient   What is the patient's perception of their health status since discharge? Improving   Nursing interventions Nurse provided patient education   Is the patient weighing daily? No   Does the patient have scales? Yes   If the patient is a current smoker, are they able to teach back resources for cessation? Not a smoker   Is the patient/caregiver able to teach back the hierarchy of who to call/visit for symptoms/problems? PCP, Specialist, Home health nurse, Urgent Care, ED, 911 Yes    CHF Week 1 call completed? Yes          TITA RAMÍREZ - Registered Nurse

## 2022-08-11 ENCOUNTER — READMISSION MANAGEMENT (OUTPATIENT)
Dept: CALL CENTER | Facility: HOSPITAL | Age: 67
End: 2022-08-11

## 2022-08-17 ENCOUNTER — READMISSION MANAGEMENT (OUTPATIENT)
Dept: CALL CENTER | Facility: HOSPITAL | Age: 67
End: 2022-08-17

## 2022-08-17 NOTE — OUTREACH NOTE
CHF Week 3 Survey    Flowsheet Row Responses   [facility] patient discharged from? [place]   Does the patient have one of the following disease processes/diagnoses(primary or secondary)? CHF   Week 3 attempt successful? Yes   Call start time 1510   Call end time 1514   Discharge diagnosis Acute on chronic combined systolic and diastolic congestive heart failure    Meds reviewed with patient/caregiver? Yes   Does the patient have all medications ordered at discharge? N/A   Is the patient taking all medications as directed (includes completed medication regime)? Yes   Comments regarding appointments nephrology appt is on 8/18/22,  Appt with neurology is on 10/5/22   Comments regarding PCP Encouraged PCP appt with [facility]   Has the patient kept scheduled appointments due by today? N/A   Pulse Ox monitoring None   What is the patient's perception of their health status since discharge? Same   Is the patient weighing daily? No   Is the patient able to teach back Heart Failure Zones? No   CHF Week 3 call completed? Yes          LAUREEN GONZALEZ - Registered Nurse

## 2022-08-26 ENCOUNTER — READMISSION MANAGEMENT (OUTPATIENT)
Dept: CALL CENTER | Facility: HOSPITAL | Age: 67
End: 2022-08-26

## 2022-08-26 NOTE — OUTREACH NOTE
CHF Week 4 Survey    Flowsheet Row Responses   [facility] patient discharged from? [place]   Does the patient have one of the following disease processes/diagnoses(primary or secondary)? CHF   Week 4 attempt successful? Yes   Call start time 1626   Call end time 1629   Discharge diagnosis Acute on chronic combined systolic and diastolic congestive heart failure    Person spoke with today (if not patient) and relationship patient   Meds reviewed with patient/caregiver? Yes   Is the patient having any side effects they believe may be caused by any medication additions or changes? No   Is the patient taking all medications as directed (includes completed medication regime)? Yes   Has the patient kept scheduled appointments due by today? No   Is the patient still receiving Home Health Services? N/A   Pulse Ox monitoring None   Psychosocial issues? No   What is the patient's perception of their health status since discharge? Improving   Nursing interventions Nurse provided patient education   Is the patient weighing daily? No   Does the patient have scales? Yes   Daily weight interventions Education provided on importance of daily weight   Is the patient able to teach back Heart Failure diet management? Yes   Is the patient able to teach back Heart Failure Zones? Yes   Is the patient able to teach back signs and symptoms of worsening condition? (i.e. weight gain, shortness of air, etc.) Yes   Week 4 Call Completed? Yes   Would the patient like one additional call? No   Graduated Yes   Is the patient interested in additional calls from an ambulatory ?  NOTE:  applies to high risk patients requiring additional follow-up. No   Did the patient feel the follow up calls were helpful during their recovery period? Yes   Was the number of calls appropriate? Yes   Does the patient have an Advance Directive or Living Will? No   Is the patient/caregiver familiar with Advance Care Planning? No   Would the patient  like more information on Advance Care Planning? No   Wrap up additional comments Pt states he is doing ok. Pt advised to make PCP/Cardiologist fu appts.          KEYLA PADILLA - Registered Nurse

## 2022-09-15 ENCOUNTER — HOSPITAL ENCOUNTER (INPATIENT)
Facility: HOSPITAL | Age: 67
LOS: 3 days | Discharge: NURSING, REHAB OR LONG-TERM CARE | End: 2022-09-19
Attending: EMERGENCY MEDICINE | Admitting: STUDENT IN AN ORGANIZED HEALTH CARE EDUCATION/TRAINING PROGRAM

## 2022-09-15 ENCOUNTER — APPOINTMENT (OUTPATIENT)
Dept: CT IMAGING | Facility: HOSPITAL | Age: 67
End: 2022-09-15

## 2022-09-15 DIAGNOSIS — R29.6 MULTIPLE FALLS: Primary | ICD-10-CM
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
## 2022-09-15 DIAGNOSIS — R77.8 TROPONIN LEVEL ELEVATED: ICD-10-CM

## 2022-09-15 DIAGNOSIS — N17.9 ACUTE RENAL FAILURE SUPERIMPOSED ON CHRONIC KIDNEY DISEASE, UNSPECIFIED CKD STAGE, UNSPECIFIED ACUTE RENAL FAILURE TYPE: ICD-10-CM
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
## 2022-09-15 DIAGNOSIS — N18.9 ACUTE RENAL FAILURE SUPERIMPOSED ON CHRONIC KIDNEY DISEASE, UNSPECIFIED CKD STAGE, UNSPECIFIED ACUTE RENAL FAILURE TYPE: ICD-10-CM

## 2022-09-15 DIAGNOSIS — E86.9 VOLUME DEPLETION: ICD-10-CM

## 2022-09-15 DIAGNOSIS — Z79.01 CHRONIC ANTICOAGULATION: ICD-10-CM

## 2022-09-15 DIAGNOSIS — S09.90XA INJURY OF HEAD, INITIAL ENCOUNTER: ICD-10-CM

## 2022-09-15 DIAGNOSIS — I95.1 ORTHOSTATIC HYPOTENSION: ICD-10-CM

## 2022-09-15 LAB
ALBUMIN SERPL-MCNC: 3.9 G/DL (ref 3.5–5.2)
ALBUMIN/GLOB SERPL: 1.6 G/DL
ALP SERPL-CCNC: 76 U/L (ref 39–117)
ALT SERPL W P-5'-P-CCNC: 30 U/L (ref 1–41)
AMPHET+METHAMPHET UR QL: NEGATIVE
ANION GAP SERPL CALCULATED.3IONS-SCNC: 15.7 MMOL/L (ref 5–15)
AST SERPL-CCNC: 26 U/L (ref 1–40)
BACTERIA UR QL AUTO: NORMAL /HPF
BARBITURATES UR QL SCN: NEGATIVE
BASOPHILS # BLD AUTO: 0.01 10*3/MM3 (ref 0–0.2)
BASOPHILS NFR BLD AUTO: 0.1 % (ref 0–1.5)
BENZODIAZ UR QL SCN: NEGATIVE
BILIRUB SERPL-MCNC: 0.3 MG/DL (ref 0–1.2)
BILIRUB UR QL STRIP: NEGATIVE
BUN SERPL-MCNC: 75 MG/DL (ref 8–23)
BUN/CREAT SERPL: 16.7 (ref 7–25)
CALCIUM SPEC-SCNC: 9.5 MG/DL (ref 8.6–10.5)
CANNABINOIDS SERPL QL: NEGATIVE
CHLORIDE SERPL-SCNC: 101 MMOL/L (ref 98–107)
CK SERPL-CCNC: 57 U/L (ref 20–200)
CLARITY UR: CLEAR
CO2 SERPL-SCNC: 24.3 MMOL/L (ref 22–29)
COCAINE UR QL: NEGATIVE
COLOR UR: YELLOW
CREAT SERPL-MCNC: 4.5 MG/DL (ref 0.76–1.27)
D-LACTATE SERPL-SCNC: 1 MMOL/L (ref 0.5–2)
DEPRECATED RDW RBC AUTO: 41.4 FL (ref 37–54)
EGFRCR SERPLBLD CKD-EPI 2021: 13.6 ML/MIN/1.73
EOSINOPHIL # BLD AUTO: 0.05 10*3/MM3 (ref 0–0.4)
EOSINOPHIL NFR BLD AUTO: 0.6 % (ref 0.3–6.2)
ERYTHROCYTE [DISTWIDTH] IN BLOOD BY AUTOMATED COUNT: 14 % (ref 12.3–15.4)
ETHANOL BLD-MCNC: <10 MG/DL (ref 0–10)
ETHANOL UR QL: <0.01 %
GLOBULIN UR ELPH-MCNC: 2.5 GM/DL
GLUCOSE BLDC GLUCOMTR-MCNC: 101 MG/DL (ref 70–130)
GLUCOSE SERPL-MCNC: 103 MG/DL (ref 65–99)
GLUCOSE UR STRIP-MCNC: NEGATIVE MG/DL
HCT VFR BLD AUTO: 35 % (ref 37.5–51)
HGB BLD-MCNC: 11.2 G/DL (ref 13–17.7)
HGB UR QL STRIP.AUTO: NEGATIVE
HYALINE CASTS UR QL AUTO: NORMAL /LPF
IMM GRANULOCYTES # BLD AUTO: 0.02 10*3/MM3 (ref 0–0.05)
IMM GRANULOCYTES NFR BLD AUTO: 0.3 % (ref 0–0.5)
KETONES UR QL STRIP: NEGATIVE
LEUKOCYTE ESTERASE UR QL STRIP.AUTO: NEGATIVE
LYMPHOCYTES # BLD AUTO: 1.19 10*3/MM3 (ref 0.7–3.1)
LYMPHOCYTES NFR BLD AUTO: 15.2 % (ref 19.6–45.3)
MCH RBC QN AUTO: 25.9 PG (ref 26.6–33)
MCHC RBC AUTO-ENTMCNC: 32 G/DL (ref 31.5–35.7)
MCV RBC AUTO: 80.8 FL (ref 79–97)
METHADONE UR QL SCN: NEGATIVE
MONOCYTES # BLD AUTO: 0.53 10*3/MM3 (ref 0.1–0.9)
MONOCYTES NFR BLD AUTO: 6.8 % (ref 5–12)
NEUTROPHILS NFR BLD AUTO: 6.02 10*3/MM3 (ref 1.7–7)
NEUTROPHILS NFR BLD AUTO: 77 % (ref 42.7–76)
NITRITE UR QL STRIP: NEGATIVE
NRBC BLD AUTO-RTO: 0 /100 WBC (ref 0–0.2)
OPIATES UR QL: NEGATIVE
OXYCODONE UR QL SCN: NEGATIVE
PH UR STRIP.AUTO: <=5 [PH] (ref 5–8)
PLATELET # BLD AUTO: 242 10*3/MM3 (ref 140–450)
PMV BLD AUTO: 10.4 FL (ref 6–12)
POTASSIUM SERPL-SCNC: 4.1 MMOL/L (ref 3.5–5.2)
PROT SERPL-MCNC: 6.4 G/DL (ref 6–8.5)
PROT UR QL STRIP: ABNORMAL
RBC # BLD AUTO: 4.33 10*6/MM3 (ref 4.14–5.8)
RBC # UR STRIP: NORMAL /HPF
REF LAB TEST METHOD: NORMAL
SODIUM SERPL-SCNC: 141 MMOL/L (ref 136–145)
SP GR UR STRIP: 1.01 (ref 1–1.03)
SQUAMOUS #/AREA URNS HPF: NORMAL /HPF
TROPONIN T SERPL-MCNC: 0.13 NG/ML (ref 0–0.03)
UROBILINOGEN UR QL STRIP: ABNORMAL
WBC # UR STRIP: NORMAL /HPF
WBC NRBC COR # BLD: 7.82 10*3/MM3 (ref 3.4–10.8)

## 2022-09-15 PROCEDURE — 80307 DRUG TEST PRSMV CHEM ANLYZR: CPT | Performed by: EMERGENCY MEDICINE

## 2022-09-15 PROCEDURE — 82550 ASSAY OF CK (CPK): CPT | Performed by: EMERGENCY MEDICINE

## 2022-09-15 PROCEDURE — 93010 ELECTROCARDIOGRAM REPORT: CPT | Performed by: INTERNAL MEDICINE

## 2022-09-15 PROCEDURE — 80053 COMPREHEN METABOLIC PANEL: CPT | Performed by: EMERGENCY MEDICINE

## 2022-09-15 PROCEDURE — 70450 CT HEAD/BRAIN W/O DYE: CPT

## 2022-09-15 PROCEDURE — 85025 COMPLETE CBC W/AUTO DIFF WBC: CPT | Performed by: EMERGENCY MEDICINE

## 2022-09-15 PROCEDURE — 82962 GLUCOSE BLOOD TEST: CPT

## 2022-09-15 PROCEDURE — 93005 ELECTROCARDIOGRAM TRACING: CPT | Performed by: EMERGENCY MEDICINE

## 2022-09-15 PROCEDURE — 81001 URINALYSIS AUTO W/SCOPE: CPT | Performed by: EMERGENCY MEDICINE

## 2022-09-15 PROCEDURE — 83605 ASSAY OF LACTIC ACID: CPT | Performed by: EMERGENCY MEDICINE

## 2022-09-15 PROCEDURE — 82077 ASSAY SPEC XCP UR&BREATH IA: CPT | Performed by: EMERGENCY MEDICINE

## 2022-09-15 PROCEDURE — 84484 ASSAY OF TROPONIN QUANT: CPT | Performed by: EMERGENCY MEDICINE

## 2022-09-15 PROCEDURE — 99284 EMERGENCY DEPT VISIT MOD MDM: CPT

## 2022-09-15 RX ORDER — SODIUM CHLORIDE 9 MG/ML
125 INJECTION, SOLUTION INTRAVENOUS CONTINUOUS
Status: DISCONTINUED | OUTPATIENT
Start: 2022-09-15 | End: 2022-09-16 | Stop reason: SDUPTHER

## 2022-09-15 RX ORDER — SODIUM CHLORIDE 0.9 % (FLUSH) 0.9 %
10 SYRINGE (ML) INJECTION AS NEEDED
Status: DISCONTINUED | OUTPATIENT
Start: 2022-09-15 | End: 2022-09-19 | Stop reason: HOSPADM

## 2022-09-15 RX ADMIN — SODIUM CHLORIDE 500 ML: 9 INJECTION, SOLUTION INTRAVENOUS at 22:55

## 2022-09-15 RX ADMIN — SODIUM CHLORIDE 125 ML/HR: 9 INJECTION, SOLUTION INTRAVENOUS at 23:55

## 2022-09-16 PROBLEM — N17.9 AKI (ACUTE KIDNEY INJURY) (HCC): Status: ACTIVE | Noted: 2022-09-16

## 2022-09-16 PROBLEM — E86.0 DEHYDRATION: Status: ACTIVE | Noted: 2022-09-16

## 2022-09-16 LAB
ANION GAP SERPL CALCULATED.3IONS-SCNC: 10.1 MMOL/L (ref 5–15)
BASOPHILS # BLD AUTO: 0.01 10*3/MM3 (ref 0–0.2)
BASOPHILS NFR BLD AUTO: 0.2 % (ref 0–1.5)
BUN SERPL-MCNC: 73 MG/DL (ref 8–23)
BUN/CREAT SERPL: 17.4 (ref 7–25)
CALCIUM SPEC-SCNC: 8.8 MG/DL (ref 8.6–10.5)
CHLORIDE SERPL-SCNC: 102 MMOL/L (ref 98–107)
CO2 SERPL-SCNC: 25.9 MMOL/L (ref 22–29)
CREAT SERPL-MCNC: 4.19 MG/DL (ref 0.76–1.27)
D-LACTATE SERPL-SCNC: 0.9 MMOL/L (ref 0.5–2)
DEPRECATED RDW RBC AUTO: 42 FL (ref 37–54)
EGFRCR SERPLBLD CKD-EPI 2021: 14.8 ML/MIN/1.73
EOSINOPHIL # BLD AUTO: 0.07 10*3/MM3 (ref 0–0.4)
EOSINOPHIL NFR BLD AUTO: 1.1 % (ref 0.3–6.2)
ERYTHROCYTE [DISTWIDTH] IN BLOOD BY AUTOMATED COUNT: 14 % (ref 12.3–15.4)
GLUCOSE BLDC GLUCOMTR-MCNC: 211 MG/DL (ref 70–130)
GLUCOSE BLDC GLUCOMTR-MCNC: 279 MG/DL (ref 70–130)
GLUCOSE BLDC GLUCOMTR-MCNC: 282 MG/DL (ref 70–130)
GLUCOSE BLDC GLUCOMTR-MCNC: 91 MG/DL (ref 70–130)
GLUCOSE SERPL-MCNC: 228 MG/DL (ref 65–99)
HAV IGM SERPL QL IA: NORMAL
HBV CORE IGM SERPL QL IA: NORMAL
HBV SURFACE AG SERPL QL IA: NORMAL
HCT VFR BLD AUTO: 31.5 % (ref 37.5–51)
HCV AB SER DONR QL: NORMAL
HGB BLD-MCNC: 10.1 G/DL (ref 13–17.7)
HIV1+2 AB SER QL: NORMAL
IMM GRANULOCYTES # BLD AUTO: 0.01 10*3/MM3 (ref 0–0.05)
IMM GRANULOCYTES NFR BLD AUTO: 0.2 % (ref 0–0.5)
LYMPHOCYTES # BLD AUTO: 1.18 10*3/MM3 (ref 0.7–3.1)
LYMPHOCYTES NFR BLD AUTO: 18.6 % (ref 19.6–45.3)
MCH RBC QN AUTO: 26.6 PG (ref 26.6–33)
MCHC RBC AUTO-ENTMCNC: 32.1 G/DL (ref 31.5–35.7)
MCV RBC AUTO: 82.9 FL (ref 79–97)
MONOCYTES # BLD AUTO: 0.58 10*3/MM3 (ref 0.1–0.9)
MONOCYTES NFR BLD AUTO: 9.1 % (ref 5–12)
NEUTROPHILS NFR BLD AUTO: 4.5 10*3/MM3 (ref 1.7–7)
NEUTROPHILS NFR BLD AUTO: 70.8 % (ref 42.7–76)
NRBC BLD AUTO-RTO: 0 /100 WBC (ref 0–0.2)
PLATELET # BLD AUTO: 233 10*3/MM3 (ref 140–450)
PMV BLD AUTO: 11.1 FL (ref 6–12)
POTASSIUM SERPL-SCNC: 3.8 MMOL/L (ref 3.5–5.2)
RBC # BLD AUTO: 3.8 10*6/MM3 (ref 4.14–5.8)
SODIUM SERPL-SCNC: 138 MMOL/L (ref 136–145)
TSH SERPL DL<=0.05 MIU/L-ACNC: 1.61 UIU/ML (ref 0.27–4.2)
WBC NRBC COR # BLD: 6.35 10*3/MM3 (ref 3.4–10.8)

## 2022-09-16 PROCEDURE — 85025 COMPLETE CBC W/AUTO DIFF WBC: CPT | Performed by: NURSE PRACTITIONER

## 2022-09-16 PROCEDURE — 80074 ACUTE HEPATITIS PANEL: CPT | Performed by: PSYCHIATRY & NEUROLOGY

## 2022-09-16 PROCEDURE — 86334 IMMUNOFIX E-PHORESIS SERUM: CPT | Performed by: PSYCHIATRY & NEUROLOGY

## 2022-09-16 PROCEDURE — 80048 BASIC METABOLIC PNL TOTAL CA: CPT | Performed by: NURSE PRACTITIONER

## 2022-09-16 PROCEDURE — 83605 ASSAY OF LACTIC ACID: CPT | Performed by: NURSE PRACTITIONER

## 2022-09-16 PROCEDURE — 84155 ASSAY OF PROTEIN SERUM: CPT | Performed by: PSYCHIATRY & NEUROLOGY

## 2022-09-16 PROCEDURE — 36415 COLL VENOUS BLD VENIPUNCTURE: CPT | Performed by: NURSE PRACTITIONER

## 2022-09-16 PROCEDURE — 84165 PROTEIN E-PHORESIS SERUM: CPT | Performed by: PSYCHIATRY & NEUROLOGY

## 2022-09-16 PROCEDURE — 97110 THERAPEUTIC EXERCISES: CPT

## 2022-09-16 PROCEDURE — G0432 EIA HIV-1/HIV-2 SCREEN: HCPCS | Performed by: PSYCHIATRY & NEUROLOGY

## 2022-09-16 PROCEDURE — 82784 ASSAY IGA/IGD/IGG/IGM EACH: CPT | Performed by: PSYCHIATRY & NEUROLOGY

## 2022-09-16 PROCEDURE — 99222 1ST HOSP IP/OBS MODERATE 55: CPT | Performed by: PSYCHIATRY & NEUROLOGY

## 2022-09-16 PROCEDURE — 84443 ASSAY THYROID STIM HORMONE: CPT | Performed by: NURSE PRACTITIONER

## 2022-09-16 PROCEDURE — 97162 PT EVAL MOD COMPLEX 30 MIN: CPT

## 2022-09-16 PROCEDURE — 63710000001 INSULIN LISPRO (HUMAN) PER 5 UNITS: Performed by: NURSE PRACTITIONER

## 2022-09-16 PROCEDURE — 82962 GLUCOSE BLOOD TEST: CPT

## 2022-09-16 RX ORDER — LEVOTHYROXINE SODIUM 0.07 MG/1
75 TABLET ORAL DAILY
Status: DISCONTINUED | OUTPATIENT
Start: 2022-09-16 | End: 2022-09-19 | Stop reason: HOSPADM

## 2022-09-16 RX ORDER — AMLODIPINE BESYLATE 10 MG/1
10 TABLET ORAL DAILY
COMMUNITY
End: 2022-09-19 | Stop reason: HOSPADM

## 2022-09-16 RX ORDER — SODIUM CHLORIDE 9 MG/ML
75 INJECTION, SOLUTION INTRAVENOUS CONTINUOUS
Status: DISCONTINUED | OUTPATIENT
Start: 2022-09-16 | End: 2022-09-17

## 2022-09-16 RX ORDER — FAMOTIDINE 10 MG
10 TABLET ORAL 2 TIMES DAILY
COMMUNITY
End: 2023-03-28 | Stop reason: ALTCHOICE

## 2022-09-16 RX ORDER — ONDANSETRON 2 MG/ML
4 INJECTION INTRAMUSCULAR; INTRAVENOUS EVERY 6 HOURS PRN
Status: DISCONTINUED | OUTPATIENT
Start: 2022-09-16 | End: 2022-09-19 | Stop reason: HOSPADM

## 2022-09-16 RX ORDER — ACETAMINOPHEN 160 MG/5ML
650 SOLUTION ORAL EVERY 4 HOURS PRN
Status: DISCONTINUED | OUTPATIENT
Start: 2022-09-16 | End: 2022-09-19 | Stop reason: HOSPADM

## 2022-09-16 RX ORDER — DEXTROSE MONOHYDRATE 25 G/50ML
25 INJECTION, SOLUTION INTRAVENOUS
Status: DISCONTINUED | OUTPATIENT
Start: 2022-09-15 | End: 2022-09-19 | Stop reason: HOSPADM

## 2022-09-16 RX ORDER — SODIUM CHLORIDE 0.9 % (FLUSH) 0.9 %
10 SYRINGE (ML) INJECTION AS NEEDED
Status: DISCONTINUED | OUTPATIENT
Start: 2022-09-15 | End: 2022-09-19 | Stop reason: HOSPADM

## 2022-09-16 RX ORDER — NICOTINE POLACRILEX 4 MG
15 LOZENGE BUCCAL
Status: DISCONTINUED | OUTPATIENT
Start: 2022-09-15 | End: 2022-09-19 | Stop reason: HOSPADM

## 2022-09-16 RX ORDER — TAMSULOSIN HYDROCHLORIDE 0.4 MG/1
0.4 CAPSULE ORAL DAILY
Status: DISCONTINUED | OUTPATIENT
Start: 2022-09-17 | End: 2022-09-19 | Stop reason: HOSPADM

## 2022-09-16 RX ORDER — CARVEDILOL 3.12 MG/1
3.12 TABLET ORAL 2 TIMES DAILY WITH MEALS
COMMUNITY

## 2022-09-16 RX ORDER — CARVEDILOL 3.12 MG/1
3.12 TABLET ORAL 2 TIMES DAILY WITH MEALS
Status: DISCONTINUED | OUTPATIENT
Start: 2022-09-16 | End: 2022-09-19 | Stop reason: HOSPADM

## 2022-09-16 RX ORDER — ATORVASTATIN CALCIUM 20 MG/1
20 TABLET, FILM COATED ORAL DAILY
Status: DISCONTINUED | OUTPATIENT
Start: 2022-09-16 | End: 2022-09-17

## 2022-09-16 RX ORDER — BUPROPION HYDROCHLORIDE 300 MG/1
300 TABLET ORAL DAILY
Status: DISCONTINUED | OUTPATIENT
Start: 2022-09-16 | End: 2022-09-19 | Stop reason: HOSPADM

## 2022-09-16 RX ORDER — ACETAMINOPHEN 325 MG/1
650 TABLET ORAL EVERY 4 HOURS PRN
Status: DISCONTINUED | OUTPATIENT
Start: 2022-09-16 | End: 2022-09-19 | Stop reason: HOSPADM

## 2022-09-16 RX ORDER — BUPROPION HYDROCHLORIDE 150 MG/1
150 TABLET ORAL DAILY
COMMUNITY

## 2022-09-16 RX ORDER — ASCORBIC ACID 500 MG
1000 TABLET ORAL DAILY
Status: DISCONTINUED | OUTPATIENT
Start: 2022-09-16 | End: 2022-09-19 | Stop reason: HOSPADM

## 2022-09-16 RX ORDER — INSULIN LISPRO 100 [IU]/ML
0-7 INJECTION, SOLUTION INTRAVENOUS; SUBCUTANEOUS
Status: DISCONTINUED | OUTPATIENT
Start: 2022-09-16 | End: 2022-09-19 | Stop reason: HOSPADM

## 2022-09-16 RX ORDER — ACETAMINOPHEN 650 MG/1
650 SUPPOSITORY RECTAL EVERY 4 HOURS PRN
Status: DISCONTINUED | OUTPATIENT
Start: 2022-09-16 | End: 2022-09-19 | Stop reason: HOSPADM

## 2022-09-16 RX ORDER — FAMOTIDINE 20 MG/1
10 TABLET, FILM COATED ORAL 2 TIMES DAILY
Status: DISCONTINUED | OUTPATIENT
Start: 2022-09-16 | End: 2022-09-19 | Stop reason: HOSPADM

## 2022-09-16 RX ORDER — ASPIRIN 81 MG/1
81 TABLET ORAL DAILY
Status: DISCONTINUED | OUTPATIENT
Start: 2022-09-16 | End: 2022-09-17

## 2022-09-16 RX ORDER — SODIUM CHLORIDE 0.9 % (FLUSH) 0.9 %
10 SYRINGE (ML) INJECTION EVERY 12 HOURS SCHEDULED
Status: DISCONTINUED | OUTPATIENT
Start: 2022-09-16 | End: 2022-09-19 | Stop reason: HOSPADM

## 2022-09-16 RX ORDER — MELATONIN
5000 DAILY
Status: DISCONTINUED | OUTPATIENT
Start: 2022-09-16 | End: 2022-09-19 | Stop reason: HOSPADM

## 2022-09-16 RX ORDER — AMLODIPINE BESYLATE 5 MG/1
5 TABLET ORAL
Status: DISCONTINUED | OUTPATIENT
Start: 2022-09-17 | End: 2022-09-19 | Stop reason: HOSPADM

## 2022-09-16 RX ORDER — INSULIN LISPRO 100 [IU]/ML
2 INJECTION, SOLUTION INTRAVENOUS; SUBCUTANEOUS
Status: DISCONTINUED | OUTPATIENT
Start: 2022-09-16 | End: 2022-09-19 | Stop reason: HOSPADM

## 2022-09-16 RX ADMIN — SODIUM CHLORIDE 100 ML/HR: 9 INJECTION, SOLUTION INTRAVENOUS at 00:16

## 2022-09-16 RX ADMIN — CARVEDILOL 3.12 MG: 3.12 TABLET, FILM COATED ORAL at 18:19

## 2022-09-16 RX ADMIN — FAMOTIDINE 10 MG: 20 TABLET ORAL at 21:05

## 2022-09-16 RX ADMIN — Medication 10 ML: at 15:50

## 2022-09-16 RX ADMIN — SODIUM CHLORIDE 100 ML/HR: 9 INJECTION, SOLUTION INTRAVENOUS at 15:50

## 2022-09-16 RX ADMIN — ATORVASTATIN CALCIUM 20 MG: 20 TABLET, FILM COATED ORAL at 18:19

## 2022-09-16 RX ADMIN — ASPIRIN 81 MG: 81 TABLET, COATED ORAL at 18:19

## 2022-09-16 RX ADMIN — Medication 10 ML: at 21:05

## 2022-09-16 RX ADMIN — LEVOTHYROXINE SODIUM 75 MCG: 0.07 TABLET ORAL at 18:19

## 2022-09-16 RX ADMIN — INSULIN LISPRO 4 UNITS: 100 INJECTION, SOLUTION INTRAVENOUS; SUBCUTANEOUS at 12:39

## 2022-09-16 NOTE — PROGRESS NOTES
[facility]charge [facility] Assessment  [facility]     Patient Name: Carlito Mo  MRN: [id number]  Today's Date: 9/16/2022    Admit Date: 9/15/2022     Discharge Needs Assessment    No documentation.                Discharge Plan     Row Name 09/16/22 1607       Plan    Plan Patient is agreeable with [facility]  skilled rehab semi-private room    Plan Comments Spoke with patient at bedside he is agreeable with [facility]  semi-private room for skilled rehab. Patient stated his family can transport. Packet in jacek. Yunior VASQUEZ              Continued Care and Services - Admitted Since 9/15/2022     Destination     Service Provider Request Status Selected Services Address Phone Fax Patient Preferred    [facility]  Pending - Request Sent N/A [address] [facility] [address] [phone] --    [facility]  Pending - Request Sent N/A [address] [facility] [address] [phone] --          Home Medical Care     Service Provider Request Status Selected Services Address Phone Fax Patient Preferred    [facility]  Accepted N/A [address] [facility] [address] [address] [phone] --                 Demographic Summary    No documentation.                Functional Status    No documentation.                Psychosocial    No documentation.                Abuse/Neglect    No documentation.                Legal    No documentation.                Substance Abuse    No documentation.                Patient Forms    No documentation.                   Norah Wei, RN

## 2022-09-16 NOTE — ED PROVIDER NOTES
EMERGENCY DEPARTMENT ENCOUNTER    Room Number:  38/38  Date of encounter:  9/15/2022  PCP: Florencia Caballero MD  Historian: Patient    Patient was placed in face mask during triage process. Patient was wearing facemask when I entered the room and throughout our encounter. I wore full protective equipment throughout this patient encounter including a face mask, eye protection, and gloves. Hand hygiene was performed before donning protective equipment and again following doffing of PPE after leaving the room.    HPI:  Chief Complaint: Generalized weakness with multiple falls  A complete HPI/ROS/PMH/PSH/SH/FH are unobtainable due to: N/A   Context: Carlito Mo is a 67 y.o. male who presents to the ED c/o increasing generalized weakness over the last 3 to 4 days with a fall yesterday and another 1 today.  He did hit his head yesterday but denies significant headache.  He fell again today and could not write himself and thus EMS was called.  Patient reports no acute pain at this time.  He feels very weak.  He reports he has not been checking his glucose with great frequency but is diabetic.  He notes he has polyuria but no dysuria or hematuria noted.  He had a normal-appearing bowel movement yesterday.  He denies significant ongoing abdominal pain but notes he has had very little appetite for the last 2 days.  No clear exacerbating or relieving factors.  No active chest pain or shortness of breath, cough, or fevers reported.  Patient is chronically anticoagulated and has known history of renal insufficiency.    MEDICAL HISTORY REVIEW  EMR reviewed:    Date of Admission: 7/24/2022  Date of Discharge:  7/29/2022  Primary Care Physician: Florencia Caballero MD        Active Hospital Problems     Diagnosis   POA   • **Acute on chronic combined systolic and diastolic congestive heart failure (HCC) [I50.43]   Yes   • Paroxysmal atrial fibrillation (HCC) [I48.0]   Yes   • Chronic anticoagulation [Z79.01]   Not Applicable   •  Acute renal failure superimposed on stage 4 chronic kidney disease (HCC) [N17.9, N18.4]   Yes   • Generalized weakness [R53.1]   Yes   • Essential hypertension [I10]   Yes   • YAW (obstructive sleep apnea) [G47.33]   Yes   • Type 2 diabetes mellitus with stage 5 chronic kidney disease not on chronic dialysis, with long-term current use of insulin ([facility]) [E11.22, N18.5, Z79.4]            PAST MEDICAL HISTORY  Active Ambulatory Problems     Diagnosis Date Noted   • Major depressive disorder with single episode, in partial remission ([facility])    • Other hyperlipidemia    • Type 2 diabetes mellitus with stage 5 chronic kidney disease not on chronic dialysis, with long-term current use of insulin ([facility])    • Vitamin D deficiency 12/17/2015   • Erectile dysfunction 12/17/2015   • Chronic fatigue 04/25/2016   • Type 2 diabetes mellitus with ophthalmic complication ([facility]) 04/25/2016   • Skin lesion of chest wall 06/20/2016   • Slow transit constipation 09/01/2016   • Benign prostatic hyperplasia with nocturia 12/20/2016   • History of basal cell carcinoma 12/20/2016   • High blood pressure associated with diabetes ([facility]) 12/20/2016   • Acquired hypothyroidism 12/20/2017   • Hypertensive urgency 02/16/2018   • Recurrent strokes ([facility]) 02/20/2018   • Noncompliance w/medication treatment due to intermit use of medication 05/04/2018   • Urinary retention 09/20/2018   • Pneumonia 09/21/2018   • Acute on chronic combined systolic and diastolic congestive heart failure ([facility]) 09/21/2018   • Acute respiratory failure with hypoxia ([facility]) 09/21/2018   • Suspected sleep apnea 10/29/2018   • Pleural effusion 12/01/2018   • YAW (obstructive sleep apnea) 12/13/2018   • Coronary artery disease involving native coronary artery of native heart without angina pectoris 04/18/2019   • Chronically elevated troponin 07/02/2019   • Colon cancer screening 10/30/2018   • Enlarged lymph nodes 10/30/2018   • Functional gait abnormality 10/30/2018   • History of  myocardial infarction 02/06/2019   • Hospital discharge follow-up 05/31/2019   • Insomnia 02/06/2019   • Iron deficiency anemia 10/02/2018   • Major depression, recurrent ([facility]) 10/16/2012   • Anemia, chronic renal failure, stage 3 (moderate) ([facility]) 02/27/2020   • Essential hypertension 03/10/2020   • Adverse effect of iron and its compounds, initial encounter 05/23/2020   • Acute on chronic renal insufficiency 05/25/2020   • Debility 05/25/2020   • Falls frequently 05/25/2020   • Acute pain of right shoulder 05/27/2020   • Acute on chronic anemia 05/25/2020   • Heme positive stool 05/25/2020   • Neurogenic orthostatic hypotension ([facility]) 05/30/2020   • Symptomatic anemia 05/25/2020   • History of colon polyps 05/25/2020   • Helicobacter pylori gastritis 06/04/2020   • Esophagitis 06/10/2020   • Sleep related hypoxia 07/23/2020   • Embolic stroke ([facility]) 05/20/2021   • Patent foramen ovale 05/22/2021   • Pleural effusion, bilateral 05/22/2021   • Cardiomyopathy ([facility]) 05/24/2021   • Lower abdominal pain 12/13/2021   • Diarrhea 12/14/2021   • CKD (chronic kidney disease) stage 4, GFR 15-29 ml/min ([facility]) 12/16/2021   • Hypertension not at goal 02/02/2022   • Memory loss due to medical condition 02/08/2022   • Generalized weakness 03/01/2022   • ERRONEOUS ENCOUNTER--DISREGARD 03/09/2022   • Weakness 06/03/2022   • Acute renal failure superimposed on stage 4 chronic kidney disease ([facility]) 07/24/2022   • Paroxysmal atrial fibrillation ([facility]) 07/25/2022   • Chronic anticoagulation 07/25/2022     Resolved Ambulatory Problems     Diagnosis Date Noted   • Anemia    • Arthritis    • Diabetes mellitus out of control    • Acute sinusitis    • Accumulation of fluid in tissues 12/17/2015   • Fatigue 12/17/2015   • Cardiomyopathy, ischemic 12/17/2015   • Acute appendicitis 03/02/2016   • Atherosclerosis of coronary artery 10/16/2012   • Altered mental status 11/30/2017   • Hypertensive encephalopathy syndrome 04/30/2018   • Hyperglycemia  05/04/2018   • Screening for colorectal cancer 08/22/2018   • Constipation 08/22/2018   • SYLVAIN (acute kidney injury) (HCC) 09/20/2018   • Snoring 12/13/2018   • Lower abdominal pain 05/27/2020   • Hypertensive emergency 02/01/2022     Past Medical History:   Diagnosis Date   • Acute congestive heart failure (HCC)    • CAD (coronary artery disease)    • Cancer (HCC)    • Chronic combined systolic and diastolic congestive heart failure (HCC)    • Chronic ischemic heart disease    • Chronic kidney disease (CKD)    • CKD (chronic kidney disease)    • COPD (chronic obstructive pulmonary disease) (HCC)    • Depression    • Diabetes mellitus type 2, uncontrolled, without complications    • Diabetic neuropathy (HCC)    • Disease of thyroid gland    • Elevated cholesterol    • Frequent PVCs    • GERD (gastroesophageal reflux disease)    • Heart attack (HCC)    • History of coronary artery disease    • Hyperlipidemia    • Hypertension    • Hypertensive encephalopathy    • Mental status change    • NO DEVICE/RECALLED    • Poor historian    • RBBB (right bundle branch block)    • Stroke (HCC)    • TIA (transient ischemic attack)    • Type 2 diabetes mellitus (HCC)          PAST SURGICAL HISTORY  Past Surgical History:   Procedure Laterality Date   • APPENDECTOMY N/A 02/14/2016    Dr. Alexey Dodson   • ARTERIOVENOUS FISTULA/SHUNT SURGERY Right 6/3/2022    Procedure: RIGHT FOREARM ARTERIOVENOUS FISTULA PLACEMENT RADIOCEPHALIC WITH CEPHALIC VEIN TRANSPOSITION;  Surgeon: Eliseo Willis MD;  Location: [facility] OR;  Service: Vascular;  Laterality: Right;   • COLONOSCOPY      over 20 years ago.  no polyps    • COLONOSCOPY N/A 9/18/2018    Procedure: COLONOSCOPY;  Surgeon: Jose Enrique Louie MD;  Location: [facility] ENDOSCOPY;  Service: Gastroenterology   • COLONOSCOPY N/A 9/19/2018    IH, EH, polyps (TAs w/low grade dysplasia)   • COLONOSCOPY N/A 6/1/2020    Procedure: COLONOSCOPY;  Surgeon: Jose Enrique Louie MD;  Location:   [facility] ENDOSCOPY;  Service: Gastroenterology;  Laterality: N/A;  Pre op-Anemia, Melena, History of Polyps  Post op-To Cecum/TI, Polyp, Poor Prep   • CORONARY ARTERY BYPASS GRAFT  2001   • ENDOSCOPY N/A 2020    Procedure: ESOPHAGOGASTRODUODENOSCOPY with biopsies;  Surgeon: Amanda Lovelace MD;  Location:  [facility];  Service: Gastroenterology;  Laterality: N/A;  pre-anemia, dark stools  post-esophagitis, hiatal hernia   • ENDOSCOPY N/A 9/15/2020    Procedure: ESOPHAGOGASTRODUODENOSCOPY WITH BIOPSIES;  Surgeon: Jose Enrique Louie MD;  Location: [facility] ENDOSCOPY;  Service: Gastroenterology;  Laterality: N/A;  pre: history of melena and esophagitis  post: mild esophagitis and gastritis, small hiatal hernia   • HERNIA REPAIR Left 2019   • TONSILLECTOMY     • VASECTOMY           FAMILY HISTORY  Family History   Problem Relation Age of Onset   • Diabetes Mother    • Hypertension Mother    • Macular degeneration Mother    • Alcohol abuse Father    • Cancer Father         lung,  age 65   • Heart disease Father    • Alcohol abuse Brother    • Cirrhosis Brother          age 50   • Liver cancer Brother 45   • Diabetes Son    • Kidney failure Son    • Autism Son    • Malig Hyperthermia Neg Hx          SOCIAL HISTORY  Social History     Socioeconomic History   • Marital status:      Spouse name: Lissette   • Number of children: 3   • Years of education: college   Tobacco Use   • Smoking status: Never Smoker   • Smokeless tobacco: Never Used   • Tobacco comment: daily caffeine   Vaping Use   • Vaping Use: Never used   Substance and Sexual Activity   • Alcohol use: No     Comment: last drink 2 months ago   • Drug use: No   • Sexual activity: Defer         ALLERGIES  Prozac [fluoxetine hcl]        REVIEW OF SYSTEMS  Review of Systems     All systems reviewed and negative except for those discussed in HPI.       PHYSICAL EXAM    I have reviewed the triage vital signs and nursing notes.    ED  Triage Vitals [09/15/22 2055]   Temp Heart Rate Resp BP SpO2   97.8 °F (36.6 °C) 63 16 138/72 100 %      Temp src Heart Rate Source Patient Position BP Location FiO2 (%)   -- -- -- -- --       Physical Exam    Physical Exam   Constitutional: No distress.  Chronically ill-appearing but not overtly toxic.  Somewhat disheveled.  HENT:  Head: Normocephalic and atraumatic.   Oropharynx: Mucous membranes are moist.   Eyes: No scleral icterus. No conjunctival pallor.  Neck: Painless range of motion noted. Neck supple.  No midline tenderness to palpation or step-off.  Cardiovascular: Normal rate, regular rhythm and intact distal pulses.  Pulmonary/Chest: No respiratory distress. There are no wheezes, no rhonchi, and no rales.   Abdominal: Soft. There is no tenderness. There is no rebound and no guarding.   Musculoskeletal: Moves all extremities equally but somewhat weakly throughout.  Atraumatic exam of the extremities. There is no pedal edema or calf tenderness.   Neurological: Alert.  Baseline strength and sensation noted.   Skin: Skin is pink, warm, and dry. No pallor.   Psychiatric: Somewhat flat affect.  Nursing note and vitals reviewed.    LAB RESULTS  Recent Results (from the past 24 hour(s))   CK    Collection Time: 09/15/22 10:07 PM    Specimen: Blood   Result Value Ref Range    Creatine Kinase 57 20 - 200 U/L   Comprehensive Metabolic Panel    Collection Time: 09/15/22 10:07 PM    Specimen: Blood   Result Value Ref Range    Glucose 103 (H) 65 - 99 mg/dL    BUN 75 (H) 8 - 23 mg/dL    Creatinine 4.50 (H) 0.76 - 1.27 mg/dL    Sodium 141 136 - 145 mmol/L    Potassium 4.1 3.5 - 5.2 mmol/L    Chloride 101 98 - 107 mmol/L    CO2 24.3 22.0 - 29.0 mmol/L    Calcium 9.5 8.6 - 10.5 mg/dL    Total Protein 6.4 6.0 - 8.5 g/dL    Albumin 3.90 3.50 - 5.20 g/dL    ALT (SGPT) 30 1 - 41 U/L    AST (SGOT) 26 1 - 40 U/L    Alkaline Phosphatase 76 39 - 117 U/L    Total Bilirubin 0.3 0.0 - 1.2 mg/dL    Globulin 2.5 gm/dL    A/G Ratio  1.6 g/dL    BUN/Creatinine Ratio 16.7 7.0 - 25.0    Anion Gap 15.7 (H) 5.0 - 15.0 mmol/L    eGFR 13.6 (L) >60.0 mL/min/1.73   Troponin    Collection Time: 09/15/22 10:07 PM    Specimen: Blood   Result Value Ref Range    Troponin T 0.133 (C) 0.000 - 0.030 ng/mL   Lactic Acid, Plasma    Collection Time: 09/15/22 10:07 PM    Specimen: Blood   Result Value Ref Range    Lactate 1.0 0.5 - 2.0 mmol/L   Ethanol    Collection Time: 09/15/22 10:07 PM    Specimen: Blood   Result Value Ref Range    Ethanol <10 0 - 10 mg/dL    Ethanol % <0.010 %   CBC Auto Differential    Collection Time: 09/15/22 10:07 PM    Specimen: Blood   Result Value Ref Range    WBC 7.82 3.40 - 10.80 10*3/mm3    RBC 4.33 4.14 - 5.80 10*6/mm3    Hemoglobin 11.2 (L) 13.0 - 17.7 g/dL    Hematocrit 35.0 (L) 37.5 - 51.0 %    MCV 80.8 79.0 - 97.0 fL    MCH 25.9 (L) 26.6 - 33.0 pg    MCHC 32.0 31.5 - 35.7 g/dL    RDW 14.0 12.3 - 15.4 %    RDW-SD 41.4 37.0 - 54.0 fl    MPV 10.4 6.0 - 12.0 fL    Platelets 242 140 - 450 10*3/mm3    Neutrophil % 77.0 (H) 42.7 - 76.0 %    Lymphocyte % 15.2 (L) 19.6 - 45.3 %    Monocyte % 6.8 5.0 - 12.0 %    Eosinophil % 0.6 0.3 - 6.2 %    Basophil % 0.1 0.0 - 1.5 %    Immature Grans % 0.3 0.0 - 0.5 %    Neutrophils, Absolute 6.02 1.70 - 7.00 10*3/mm3    Lymphocytes, Absolute 1.19 0.70 - 3.10 10*3/mm3    Monocytes, Absolute 0.53 0.10 - 0.90 10*3/mm3    Eosinophils, Absolute 0.05 0.00 - 0.40 10*3/mm3    Basophils, Absolute 0.01 0.00 - 0.20 10*3/mm3    Immature Grans, Absolute 0.02 0.00 - 0.05 10*3/mm3    nRBC 0.0 0.0 - 0.2 /100 WBC   ECG 12 Lead    Collection Time: 09/15/22 10:19 PM   Result Value Ref Range    QT Interval 480 ms   Urinalysis With Culture If Indicated - Urine, Clean Catch    Collection Time: 09/15/22 10:51 PM    Specimen: Urine, Clean Catch   Result Value Ref Range    Color, UA Yellow Yellow, Straw    Appearance, UA Clear Clear    pH, UA <=5.0 5.0 - 8.0    Specific Gravity, UA 1.013 1.005 - 1.030    Glucose, UA Negative  Negative    Ketones, UA Negative Negative    Bilirubin, UA Negative Negative    Blood, UA Negative Negative    Protein,  mg/dL (2+) (A) Negative    Leuk Esterase, UA Negative Negative    Nitrite, UA Negative Negative    Urobilinogen, UA 0.2 E.U./dL 0.2 - 1.0 E.U./dL   Urine Drug Screen - Urine, Clean Catch    Collection Time: 09/15/22 10:51 PM    Specimen: Urine, Clean Catch   Result Value Ref Range    Amphet/Methamphet, Screen Negative Negative    Barbiturates Screen, Urine Negative Negative    Benzodiazepine Screen, Urine Negative Negative    Cocaine Screen, Urine Negative Negative    Opiate Screen Negative Negative    THC, Screen, Urine Negative Negative    Methadone Screen, Urine Negative Negative    Oxycodone Screen, Urine Negative Negative   POC Glucose Once    Collection Time: 09/15/22 10:51 PM    Specimen: Blood   Result Value Ref Range    Glucose 101 70 - 130 mg/dL   Urinalysis, Microscopic Only - Urine, Clean Catch    Collection Time: 09/15/22 10:51 PM    Specimen: Urine, Clean Catch   Result Value Ref Range    RBC, UA 0-2 None Seen, 0-2 /HPF    WBC, UA 0-2 None Seen, 0-2 /HPF    Bacteria, UA None Seen None Seen /HPF    Squamous Epithelial Cells, UA 0-2 None Seen, 0-2 /HPF    Hyaline Casts, UA 3-6 None Seen /LPF    Methodology Automated Microscopy        Ordered the above labs and independently reviewed the results.        RADIOLOGY  No Radiology Exams Resulted Within Past 24 Hours    I ordered the above noted radiological studies. Reviewed by me and discussed with radiologist.  See dictation for official radiology interpretation.      PROCEDURES    Procedures        MEDICATIONS GIVEN IN ER    Medications   sodium chloride 0.9 % flush 10 mL (has no administration in time range)   sodium chloride 0.9 % infusion (has no administration in time range)   sodium chloride 0.9 % bolus 500 mL (0 mL Intravenous Stopped 9/15/22 1022)         PROGRESS, DATA ANALYSIS, CONSULTS, AND MEDICAL DECISION MAKING    My  differential diagnosis includes but is not limited to generalized weakness, electrolyte abnormality, CVA, TIA, Bell's palsy, acute MI, GI bleed, urinary tract infection, systemic infections including sepsis, alcohol abuse, drug abuse including prescription and street drug.    My differential diagnosis includes but is not limited to cerebral contusion, cervical strain, concussion with LOC, concussion without LOC, contusion, fracture of the skull, orbits or mandible, hematoma, intracranial hemorrhage including subdural, epidural, subarachnoid and intracerebral, laceration and postconcussion syndrome      All labs have been independently reviewed by me.  All radiology studies have been reviewed by me and discussed with radiologist dictating the report.   EKG's independently viewed and interpreted by me.  Discussion below represents my analysis of pertinent findings related to patient's condition, differential diagnosis, treatment plan and final disposition.      ED Course as of 09/15/22 2353   Thu Sep 15, 2022   2200 EKG           EKG time: 2219  Rhythm/Rate: Sinus, 60  P waves and OK: First-degree AV block  QRS, axis: IVCD consistent with right bundle branch block  ST and T waves: ST/T wave repolarization abnormalities    Interpreted Contemporaneously by me, independently viewed  Comparison: 7/24/2022-no acute change   [RS]   2231 WBC: 7.82 [RS]   2231 Hemoglobin(!): 11.2 [RS]   2231 Platelets: 242 [RS]   2239 Lactate: 1.0 [RS]   2245 Creatine Kinase: 57 [RS]   2245 BUN(!): 75 [RS]   2245 Creatinine(!): 4.50 [RS]   2245 Above prior baseline. [RS]   2250 Troponin T(!!): 0.133  Chronic elevation likely secondary to renal dysfunction.  No acute STEMI on EKG.  Will trend. [RS]   2252 Patient was orthostatic with standing.  Glucose reasonable.  IV fluids ordered.  Patient will likely need admission. [RS]   2310 Ethanol: <10 [RS]   2330 Leukocytes, UA: Negative [RS]   2330 Nitrite, UA: Negative [RS]   2330  Amphet/Methamphet, Screen: Negative [RS]   2330 Cocaine Screen, Urine: Negative [RS]   2330 Bacteria, UA: None Seen [RS]   2330 WBC, UA: 0-2 [RS]   2353 CONSULT        [facility]    Discussion: Reviewed patient history, ED presentation and evaluation.  Pending head CT result, agreeable to accept patient for observation admission with telemetry for further evaluation and treatment on behalf of Dr. Marie.    Agreeable c treatment and planned disposition.         [RS]      ED Course User Index  [RS] Gregory Marie MD       AS OF 23:53 EDT VITALS:    BP - 171/78  HR - 64  TEMP - 97.8 °F (36.6 °C)  O2 SATS - 99%        DIAGNOSIS  Final diagnoses:   Multiple falls   Fall in home, initial encounter   Injury of head, initial encounter   Chronic anticoagulation   Acute renal failure superimposed on chronic kidney disease, unspecified CKD stage, unspecified acute renal failure type (HCC)   Volume depletion   Orthostatic hypotension   Chronic troponin elevation         DISPOSITION  ADMISSION    Discussed treatment plan and reason for admission with pt/family and admitting physician.  Pt/family voiced understanding of the plan for admission for further testing/treatment as needed.          Gregory Marie MD  09/15/22 2034

## 2022-09-16 NOTE — ED NOTES
Pt reports to ER by EMS from home after a fall. Pt was got up from bed, became weak and fell. Pt denies hitting head but was on ground 30 minutes, foun by EMS. Pt on blood thinners. Pt alert and oriented x4. Speech clear. Pt reports decrease in oral intake for the past couple of days and increased weakness. Pt denies lacerations from fall. Pt reports recent hx of falls.      Patient wearing mask, nurse wearing mask, n95, protective eyewear for care and assessment.  Hand hygiene performed prior to and post care.

## 2022-09-16 NOTE — DISCHARGE PLACEMENT REQUEST
"Carlito Mo (67 y.o. Male)             Date of Birth   1955    Social Security Number       Address   [address] [place] [id number]    Home Phone   [phone]    MRN   [id number]       [facility]    Marital Status                               Admission Date   9/15/22    Admission Type   Emergency    Admitting Provider   Delmar Tse MD    Attending Provider   Delmar Tse MD    Department, Room/Bed   [address], [address]       Discharge Date       Discharge Disposition       Discharge Destination                               Attending Provider: Delmar Tse MD    Allergies: Prozac [Fluoxetine Hcl]    Isolation: None   Infection: None   Code Status: CPR   Advance Care Planning Activity    Ht: 182.9 cm (72.01\")   Wt: 74.6 kg (164 lb 7.4 oz)    Admission Cmt: None   Principal Problem: Multiple falls [R29.6]                 Active Insurance as of 9/15/2022     Primary Coverage     Payor Plan Insurance Group Employer/Plan Group    MEDICARE MEDICARE A & B      Payor Plan Address Payor Plan Phone Number Payor Plan Fax Number Effective Dates    [address] [phone]  5/1/2020 - None Entered    formerly [facility] [id number]       Subscriber Name Subscriber Birth Date Member ID       CARLITO [place] 1955 [id number]           Secondary Coverage     Payor Plan Insurance Group Employer/Plan Group    [place]      Payor Plan Address Payor Plan Phone Number Payor Plan Fax Number Effective Dates    [address] [phone]  2/1/2022 - None Entered    [facility] [place] [address]       Subscriber Name Subscriber Birth Date Member ID       CARLITO [place] 1955 [id number]                 Emergency Contacts      (Rel.) Home Phone Work Phone Mobile Phone    Lissette Mo (Spouse) [phone] -- [phone]              "

## 2022-09-16 NOTE — CONSULTS
"Neurology Consult Note    Referring Provider: Dr. Tse  Reason for Consultation: Generalized weakness      History of present illness:      67-year-old man with past medical history of type 2 diabetes, hypertension, coronary artery disease, YAW, chronic kidney disease, paroxysmal atrial fibrillation on chronic anticoagulation with Eliquis, multiple strokes, PFO, depression, hyperlipidemia, frequent falls, neurogenic orthostatic hypotension, memory loss    Presents with increasing generalized weakness over the last 3 to 4 days with 2 falls.  He notes generalized fatigue over the past several days with decreased p.o. intake and episodes of nausea and vomiting.  Denies fever.  One of the falls happened when he was walking to the bathroom and he developed lightheadedness and went down with a brief loss of consciousness.  This event was similar to previous fainting spells that he experienced in the past.  The second fall he felt like his legs gave out from under him.  He notes some floaters in his vision bilaterally recently.  He notes some trouble with urinary incontinence recently.  He also felt a bit confused over the last couple of days with some word finding troubles.  No speech or swallowing trouble.  No head injury, no back injury.  No headache.  He reports improvement in all of the symptoms since admission to the hospital.  There have been previous admissions for generalized weakness and falls.  Normally ambulates with a cane.  Seen by neurology on 7/1/2022 \"noted to have bradykinesia and a slow, cautious and unsteady gait at that time.     Currently found to be in acute renal failure, orthostatics positive, poor p.o. intake.  BP 91/66 when standing    Head CT without contrast reviewed, old right cerebellar infarct, moderate to severe periventricular and deep white matter microvascular changes -more prominent in the left parietal region, no evidence of acute intracranial process, no change from head CT from " 1/2022    Past Medical History:   Diagnosis Date   • Acquired hypothyroidism    • Acute congestive heart failure (HCC)    • Acute respiratory failure with hypoxia (HCC)    • Acute sinusitis    • SYLVAIN (acute kidney injury) (HCC)     on CKD   • Anemia    • Arthritis    • CAD (coronary artery disease)    • Cancer (HCC)     skin   • Chronic combined systolic and diastolic congestive heart failure (HCC)    • Chronic ischemic heart disease    • Chronic kidney disease (CKD)    • CKD (chronic kidney disease)    • COPD (chronic obstructive pulmonary disease) (Formerly [facility])    • Depression    • Diabetes mellitus type 2, uncontrolled, without complications    • Diabetic neuropathy ([facility])    • Disease of thyroid gland    • Elevated cholesterol    • Fatigue    • Frequent PVCs    • Generalized weakness    • GERD (gastroesophageal reflux disease)    • Heart attack ([facility])    • History of coronary artery disease     with remote history of bypass surgery in 2001   • Hyperlipidemia    • Hypertension    • Hypertensive encephalopathy    • Mental status change     Acute   • NO DEVICE/RECALLED    • Pleural effusion    • Pneumonia    • Poor historian    • RBBB (right bundle branch block)    • Stroke ([facility])     POOR VISION   • TIA (transient ischemic attack)    • Type 2 diabetes mellitus (Formerly [facility])    • Urinary retention    • Vitamin D deficiency        Allergies   Allergen Reactions   • Prozac [Fluoxetine Hcl] Other (See Comments)     shaking     No current facility-administered medications on file prior to encounter.     Current Outpatient Medications on File Prior to Encounter   Medication Sig   • aspirin 81 MG EC tablet Take 81 mg by mouth Daily. INSTR TO FOLLOW MD GUIDELINE ON WHEN/IF TO HOLD DAY OF SURGERY   • atorvastatin (LIPITOR) 20 MG tablet Take 20 mg by mouth every night at bedtime.   • Cholecalciferol (VITAMIN D3) 5000 units capsule capsule Take 5,000 Units by mouth Daily.   • Eliquis 2.5 MG tablet tablet TAKE 1 TABLET BY MOUTH EVERY TWELVE  HOURS (Patient taking differently: Take 2.5 mg by mouth Daily.)   • Insulin Glargine (BASAGLAR KWIKPEN) 100 UNIT/ML injection pen Inject 4 Units under the skin into the appropriate area as directed Every Night. Patient says he does not take consistently (Patient taking differently: Inject 4 Units under the skin into the appropriate area as directed Every Night.)   • insulin glulisine (Apidra) 100 UNIT/ML injection Inject 2 Units under the skin into the appropriate area as directed 3 (Three) Times a Day Before Meals. Patient says he does not take consistently (Patient taking differently: Inject 2 Units under the skin into the appropriate area as directed 3 (Three) Times a Day Before Meals. HAS NOT STARTED TAKING YET, WAITING FOR SUPPLIES)   • levothyroxine (SYNTHROID, LEVOTHROID) 75 MCG tablet Take 1 tablet by mouth Daily.   • memantine (NAMENDA) 5 MG tablet 1 tab daily for 1 week, then 1 tab twice daily for 1 week, then 2 tabs in the morning and 1 tab at night for 1 week, then 2 tabs twice daily (Patient taking differently: Take 5 mg by mouth 2 (Two) Times a Day. 1 tab daily for 1 week, then 1 tab twice daily for 1 week, then 2 tabs in the morning and 1 tab at night for 1 week, then 2 tabs twice daily)   • pantoprazole (PROTONIX) 40 MG EC tablet Take 40 mg by mouth Daily.   • tamsulosin (FLOMAX) 0.4 MG capsule 24 hr capsule Take 1 capsule by mouth Every Night.   • torsemide (DEMADEX) 100 MG tablet Take 1 tablet by mouth Daily for 30 days.   • vitamin C (ASCORBIC ACID) 250 MG tablet Take 1,000 mg by mouth Daily.       Social History     Socioeconomic History   • Marital status:      Spouse name: Lissette   • Number of children: 3   • Years of education: college   Tobacco Use   • Smoking status: Never Smoker   • Smokeless tobacco: Never Used   • Tobacco comment: daily caffeine   Vaping Use   • Vaping Use: Never used   Substance and Sexual Activity   • Alcohol use: No     Comment: last drink 2 months ago   • Drug  use: No   • Sexual activity: Defer     Family History   Problem Relation Age of Onset   • Diabetes Mother    • Hypertension Mother    • Macular degeneration Mother    • Alcohol abuse Father    • Cancer Father         lung,  age 65   • Heart disease Father    • Alcohol abuse Brother    • Cirrhosis Brother          age 50   • Liver cancer Brother 45   • Diabetes Son    • Kidney failure Son    • Autism Son    • Malig Hyperthermia Neg Hx        Review of Systems  A 14 point review of systems was reviewed and was negative except for the complaints listed in the HPI    Vital Signs   Temp:  [97.8 °F (36.6 °C)-98.3 °F (36.8 °C)] 98.3 °F (36.8 °C)  Heart Rate:  [59-90] 62  Resp:  [16-18] 16  BP: ()/(66-98) 137/78    General Exam:  Cachectic appearing male, no acute distress  Head:  Normal cephalic, atraumatic  HEENT:  Neck supple  CVS:  Regular rate and rhythm.  No murmurs  Carotid Examination:  No bruits  Lungs:  Clear to auscultation  Abdomen:  Non-tender, Non-distended  Extremities:  No signs of peripheral edema  Skin:  No rashes    Neurologic Exam:    Mental Status:    -Awake, Alert, Oriented X 3  -No word finding difficulties  -No aphasia  -No dysarthria  -Follows simple and complex commands  -Counts 20-1 without errors    CN II:  Visual fields full.  Pupils equally reactive to light  CN III, IV, VI:  Extraocular Muscles full with no signs of nystagmus  CN V:  Facial sensory is symmetric   CN VII:  Facial motor symmetric  CN VIII:  Gross hearing intact bilaterally  CN IX:  Palate elevates symmetrically  CN X:  Palate elevates symmetrically  CN XI:  Shoulder shrug symmetric  CN XII:  Tongue is midline on protrusion    Motor: (strength out of 5:  1= minimal movement, 2 = movement in plane of gravity, 3 = movement against gravity, 4 = movement against some resistance, 5 = full strength)  Trace pronator drift in the left upper extremity  -Right Upper Ext: Proximal: 5 Distal: 5  -Left Upper Ext:  Proximal: 5 Distal: 5    -Right Lower Ext: Proximal: 5- Distal: 5  -Left Lower Ext: Proximal: 5-   Distal: 5    DTR:  -Right   Bicep: 2+ Tricep: 2+ Brachoradialis: 2+   Patella: 2+ Ankle: 2+ Neg Babinski  -Left   Bicep: 2+ Tricep: 2+ Brachoradialis: 2+   Patella: 2+ Ankle: 2+ Neg Babinski    Sensory:  -Diminished to light touch, pinprick, temperature, pain, in the feet bilaterally , diminished to proprioception in the toes    Coordination:  -Finger to nose intact  -Heel to shin intact  -No ataxia    Gait  Deferred for patient's safety      Results Review:  Lab Results (last 24 hours)     Procedure Component Value Units Date/Time    TSH [[id number]]  (Normal) Collected: 09/16/22 0434    Specimen: Blood Updated: 09/16/22 1105     TSH 1.610 uIU/mL     POC Glucose Once [[id number]]  (Abnormal) Collected: 09/16/22 1102    Specimen: Blood Updated: 09/16/22 1104     Glucose 279 mg/dL      Comment: Meter: [id number] : [id number] Keith RAMIREZ       Lactic Acid, Plasma [[id number]]  (Normal) Collected: 09/16/22 0434    Specimen: Blood Updated: 09/16/22 0730     Lactate 0.9 mmol/L     Basic Metabolic Panel [[id number]]  (Abnormal) Collected: 09/16/22 0434    Specimen: Blood Updated: 09/16/22 0729     Glucose 228 mg/dL      BUN 73 mg/dL      Creatinine 4.19 mg/dL      Sodium 138 mmol/L      Potassium 3.8 mmol/L      Chloride 102 mmol/L      CO2 25.9 mmol/L      Calcium 8.8 mg/dL      BUN/Creatinine Ratio 17.4     Anion Gap 10.1 mmol/L      eGFR 14.8 mL/min/1.73      Comment: <15 Indicative of kidney failure       Narrative:      GFR Normal >60  Chronic Kidney Disease <60  Kidney Failure <15      POC Glucose Once [[id number]]  (Abnormal) Collected: 09/16/22 0558    Specimen: Blood Updated: 09/16/22 0559     Glucose 211 mg/dL      Comment: Meter: [id number] : [id number] Hany RAMIREZ       CBC Auto Differential [[id number]]  (Abnormal) Collected: 09/16/22 0434    Specimen: Blood Updated: 09/16/22 0537     WBC 6.35 10*3/mm3       RBC 3.80 10*6/mm3      Hemoglobin 10.1 g/dL      Hematocrit 31.5 %      MCV 82.9 fL      MCH 26.6 pg      MCHC 32.1 g/dL      RDW 14.0 %      RDW-SD 42.0 fl      MPV 11.1 fL      Platelets 233 10*3/mm3      Neutrophil % 70.8 %      Lymphocyte % 18.6 %      Monocyte % 9.1 %      Eosinophil % 1.1 %      Basophil % 0.2 %      Immature Grans % 0.2 %      Neutrophils, Absolute 4.50 10*3/mm3      Lymphocytes, Absolute 1.18 10*3/mm3      Monocytes, Absolute 0.58 10*3/mm3      Eosinophils, Absolute 0.07 10*3/mm3      Basophils, Absolute 0.01 10*3/mm3      Immature Grans, Absolute 0.01 10*3/mm3      nRBC 0.0 /100 WBC     Urine Drug Screen - Urine, Clean Catch [[id number]]  (Normal) Collected: 09/15/22 2251    Specimen: Urine, Clean Catch Updated: 09/15/22 2320     Amphet/Methamphet, Screen Negative     Barbiturates Screen, Urine Negative     Benzodiazepine Screen, Urine Negative     Cocaine Screen, Urine Negative     Opiate Screen Negative     THC, Screen, Urine Negative     Methadone Screen, Urine Negative     Oxycodone Screen, Urine Negative    Narrative:      Negative Thresholds Per Drugs Screened:    Amphetamines                 500 ng/ml  Barbiturates                 200 ng/ml  Benzodiazepines              100 ng/ml  Cocaine                      300 ng/ml  Methadone                    300 ng/ml  Opiates                      300 ng/ml  Oxycodone                    100 ng/ml  THC                           50 ng/ml    The Normal Value for all drugs tested is negative. This report includes final unconfirmed screening results to be used for medical treatment purposes only. Unconfirmed results must not be used for non-medical purposes such as employment or legal testing. Clinical consideration should be applied to any drug of abuse test, particularly when unconfirmed results are used.            Urinalysis, Microscopic Only - Urine, Clean Catch [[id number]] Collected: 09/15/22 2251    Specimen: Urine, Clean Catch Updated:  09/15/22 2318     RBC, UA 0-2 /HPF      WBC, UA 0-2 /HPF      Comment: Urine culture not indicated.        Bacteria, UA None Seen /HPF      Squamous Epithelial Cells, UA 0-2 /HPF      Hyaline Casts, UA 3-6 /LPF      Methodology Automated Microscopy    Urinalysis With Culture If Indicated - Urine, Clean Catch [[id number]]  (Abnormal) Collected: 09/15/22 2251    Specimen: Urine, Clean Catch Updated: 09/15/22 2318     Color, UA Yellow     Appearance, UA Clear     pH, UA <=5.0     Specific Gravity, UA 1.013     Glucose, UA Negative     Ketones, UA Negative     Bilirubin, UA Negative     Blood, UA Negative     Protein,  mg/dL (2+)     Leuk Esterase, UA Negative     Nitrite, UA Negative     Urobilinogen, UA 0.2 E.U./dL    Narrative:      In absence of clinical symptoms, the presence of pyuria, bacteria, and/or nitrites on the urinalysis result does not correlate with infection.    POC Glucose Once [[id number]]  (Normal) Collected: 09/15/22 2251    Specimen: Blood Updated: 09/15/22 2252     Glucose 101 mg/dL      Comment: Meter: [id number] : [id number] Risk [facility]       Troponin [[id number]]  (Abnormal) Collected: 09/15/22 2207    Specimen: Blood Updated: 09/15/22 2248     Troponin T 0.133 ng/mL     Narrative:      Troponin T Reference Range:  <= 0.03 ng/mL-   Negative for AMI  >0.03 ng/mL-     Abnormal for myocardial necrosis.  Clinicians would have to utilize clinical acumen, EKG, Troponin and serial changes to determine if it is an Acute Myocardial Infarction or myocardial injury due to an underlying chronic condition.       Results may be falsely decreased if patient taking Biotin.      CK [[id number]]  (Normal) Collected: 09/15/22 2207    Specimen: Blood Updated: 09/15/22 2243     Creatine Kinase 57 U/L     Ethanol [[id number]] Collected: 09/15/22 2207    Specimen: Blood Updated: 09/15/22 2243     Ethanol <10 mg/dL      Ethanol % <0.010 %     Comprehensive Metabolic Panel [[id number]]  (Abnormal) Collected:  09/15/22 2207    Specimen: Blood Updated: 09/15/22 2243     Glucose 103 mg/dL      BUN 75 mg/dL      Creatinine 4.50 mg/dL      Sodium 141 mmol/L      Potassium 4.1 mmol/L      Chloride 101 mmol/L      CO2 24.3 mmol/L      Calcium 9.5 mg/dL      Total Protein 6.4 g/dL      Albumin 3.90 g/dL      ALT (SGPT) 30 U/L      AST (SGOT) 26 U/L      Alkaline Phosphatase 76 U/L      Total Bilirubin 0.3 mg/dL      Globulin 2.5 gm/dL      A/G Ratio 1.6 g/dL      BUN/Creatinine Ratio 16.7     Anion Gap 15.7 mmol/L      eGFR 13.6 mL/min/1.73      Comment: <15 Indicative of kidney failure       Narrative:      GFR Normal >60  Chronic Kidney Disease <60  Kidney Failure <15      Lactic Acid, Plasma [[id number]]  (Normal) Collected: 09/15/22 2207    Specimen: Blood Updated: 09/15/22 2234     Lactate 1.0 mmol/L     CBC & Differential [[id number]]  (Abnormal) Collected: 09/15/22 2207    Specimen: Blood Updated: 09/15/22 2219    Narrative:      The following orders were created for panel order CBC & Differential.  Procedure                               Abnormality         Status                     ---------                               -----------         ------                     CBC Auto Differential[[id number]]        Abnormal            Final result                 Please view results for these tests on the individual orders.    CBC Auto Differential [[id number]]  (Abnormal) Collected: 09/15/22 2207    Specimen: Blood Updated: 09/15/22 2219     WBC 7.82 10*3/mm3      RBC 4.33 10*6/mm3      Hemoglobin 11.2 g/dL      Hematocrit 35.0 %      MCV 80.8 fL      MCH 25.9 pg      MCHC 32.0 g/dL      RDW 14.0 %      RDW-SD 41.4 fl      MPV 10.4 fL      Platelets 242 10*3/mm3      Neutrophil % 77.0 %      Lymphocyte % 15.2 %      Monocyte % 6.8 %      Eosinophil % 0.6 %      Basophil % 0.1 %      Immature Grans % 0.3 %      Neutrophils, Absolute 6.02 10*3/mm3      Lymphocytes, Absolute 1.19 10*3/mm3      Monocytes, Absolute 0.53 10*3/mm3       Eosinophils, Absolute 0.05 10*3/mm3      Basophils, Absolute 0.01 10*3/mm3      Immature Grans, Absolute 0.02 10*3/mm3      nRBC 0.0 /100 WBC           .  Imaging Results (Last 24 Hours)     Procedure Component Value Units Date/Time    CT Head Without Contrast [[id number]] Collected: 09/16/22 0003     Updated: 09/16/22 0010    Narrative:      CT HEAD WITHOUT CONTRAST     HISTORY: Fall with injury     COMPARISON: The first 2022     TECHNIQUE: Axial CT imaging was obtained through the brain. No IV  contrast was administered.     FINDINGS:  No acute intracranial hemorrhage is seen there is advanced atrophy for  the age of 67. There is periventricular and deep white matter  microangiopathic change. There is no midline shift or mass effect. There  is an old right cerebellar infarct. Additional old infarcts are  suspected within the bilateral corona radiata. No calvarial fracture is  seen. Mucous retention cyst is seen within the right maxillary sinus.  Suspected sebaceous cysts are noted within the scalp.       Impression:      No acute intracranial findings.     Radiation dose reduction techniques were utilized, including automated  exposure control and exposure modulation based on body size.     This report was finalized on 9/16/2022 12:07 AM by Dr. Alice Allen M.D.                 Impression    Exacerbation of chronic weakness and confusion in the setting of acute renal failure with elevated BUN, improving with hydration.  No clear new focal deficits noted presently.  History of multiple cardioembolic strokes, maintained on anticoagulation with Eliquis.  Current symptoms do not appear to be related to acute cerebral ischemia, but given his history would recommend further imaging to rule out acute or subacute infarct  Sensory polyneuropathy likely related to diabetes  Orthostatic hypotension likely related to diabetic autonomic neuropathy      Plan    Given his history will obtain MRI brain without contrast to  rule out acute infarct  Recommend restarting full dose anticoagulation as soon as medically appropriate based on his renal functions-will defer to primary team  Neuropathy work-up: B12, folate, HIV, hepatitis serologies, serum protein immunoelectrophoresis  Could consider use of midodrine and compression stockings/compression belt for orthostatic hypotension  PT -patient is at high risk for falls      I discussed the patients findings and my recommendations with patient    Margot Corbin MD  09/16/22  12:26 EDT

## 2022-09-16 NOTE — PLAN OF CARE
Goal Outcome Evaluation:  Plan of Care Reviewed With: patient        Progress: improving  Outcome Evaluation: Pt seen for PT yaniv this afternoon. He was admitted yesterday after experiencing multiple falls at home. Today, pt does not report any pain. He does present w generalized weakness, decreased activity tolerance, and overall decreased functional mobility. At baseline, he lives at home w his wife and uses a cane for ambulation at times. Currently pt able to sit EOB w supervision. He then stood w CGA and ambulated approx 30 ft w Rwx. He exhibits slow pace, but no overt LOB noted. He is limited by fatigue and weakness. Pt up in chair at end of session. Pt unsure of DC plans at this time, home w assist vs SNU pending progress. He will continue to benefit from skilled PT to maximize safety, strength, and independence w mobility.

## 2022-09-16 NOTE — H&P
Patient Name:  Carlito Mo  YOB: 1955  MRN:  [id number]  Admit Date:  9/15/2022  Patient Care Team:  Florencia Caballero MD as PCP - General (Internal Medicine)  Amber Murguia MD as Consulting Physician (Cardiology)  Sera La [facility] as Pharmacist  Seth Ladd MD as Surgeon (General Surgery)  Kim Hoyos MD PhD as Consulting Physician (Hematology and Oncology)  Jerome Diallo MD as Referring Physician (Family Medicine)  Jose Enrique Louie MD as Consulting Physician (Gastroenterology)  Nima Huddleston MD as Consulting Physician (Nephrology)      Subjective   History Present Illness     Chief Complaint   Patient presents with   • Fall   • Weakness - Generalized       Mr. Mo is a 67 y.o.  history of COPD, CKD, chronic combined systolic and diastolic congestive heart failure, type 2 diabetes mellitus, noncompliance, GERD, hypertension, hyperlipidemia who presented to [facility] complaining of generalized weakness and fatigue.  He has impaired mobility at baseline and uses a cane at home.  According to family and patient he has been increasingly weaker in the last 3 days.  He fell yesterday and hit his head and fell again today.  No loss of consciousness, lightheadedness, chest pain, shortness of breath, palpitations fever or chills.  He denies specific musculoskeletal injury.  He has a chronically elevated troponin.  He does not check his glucose regularly but he is a diabetic.  He has had minimal appetite in the last few days but denies nausea vomiting trouble swallowing abdominal pain or diarrhea.  He is chronically anticoagulated but denies abnormal bleeding.        History of Present Illness  Review of Systems   Constitutional: Positive for activity change and fatigue. Negative for appetite change, chills and diaphoresis.   HENT: Negative for congestion and trouble swallowing.    Eyes: Negative for visual disturbance.   Respiratory: Negative for  cough, choking, chest tightness and shortness of breath.    Cardiovascular: Negative for chest pain.   Gastrointestinal: Negative for abdominal distention, abdominal pain, blood in stool, constipation, diarrhea, nausea and vomiting.        Decreased appetite   Genitourinary: Negative for decreased urine volume, difficulty urinating, dysuria, flank pain, frequency and hematuria.   Musculoskeletal: Negative for back pain.   Skin: Negative for pallor.   Neurological: Positive for weakness (general ). Negative for dizziness.   Psychiatric/Behavioral: Negative for confusion.        Personal History     Past Medical History:   Diagnosis Date   • Acquired hypothyroidism    • Acute congestive heart failure (HCC)    • Acute respiratory failure with hypoxia (HCC)    • Acute sinusitis    • SYLVAIN (acute kidney injury) ([facility])     on CKD   • Anemia    • Arthritis    • CAD (coronary artery disease)    • Cancer (HCC)     skin   • Chronic combined systolic and diastolic congestive heart failure (HCC)    • Chronic ischemic heart disease    • Chronic kidney disease (CKD)    • CKD (chronic kidney disease)    • COPD (chronic obstructive pulmonary disease) ([facility])    • Depression    • Diabetes mellitus type 2, uncontrolled, without complications    • Diabetic neuropathy ([facility])    • Disease of thyroid gland    • Elevated cholesterol    • Fatigue    • Frequent PVCs    • Generalized weakness    • GERD (gastroesophageal reflux disease)    • Heart attack ([facility])    • History of coronary artery disease     with remote history of bypass surgery in 2001   • Hyperlipidemia    • Hypertension    • Hypertensive encephalopathy    • Mental status change     Acute   • NO DEVICE/RECALLED    • Pleural effusion    • Pneumonia    • Poor historian    • RBBB (right bundle branch block)    • Stroke ([facility])     POOR VISION   • TIA (transient ischemic attack)    • Type 2 diabetes mellitus (HCC)    • Urinary retention    • Vitamin D deficiency      Past Surgical History:    Procedure Laterality Date   • APPENDECTOMY N/A 2016    Dr. Alexey Dodson   • ARTERIOVENOUS FISTULA/SHUNT SURGERY Right 6/3/2022    Procedure: RIGHT FOREARM ARTERIOVENOUS FISTULA PLACEMENT RADIOCEPHALIC WITH CEPHALIC VEIN TRANSPOSITION;  Surgeon: Eliseo Willis MD;  Location: [facility] MAIN OR;  Service: Vascular;  Laterality: Right;   • COLONOSCOPY      over 20 years ago.  no polyps    • COLONOSCOPY N/A 2018    Procedure: COLONOSCOPY;  Surgeon: Jose Enrique Louie MD;  [facility];  Service: Gastroenterology   • COLONOSCOPY N/A 2018    IH, EH, polyps (TAs w/low grade dysplasia)   • COLONOSCOPY N/A 2020    Procedure: COLONOSCOPY;  Surgeon: Jose Enrique Louie MD;  Location: [facility] ENDOSCOPY;  Service: Gastroenterology;  Laterality: N/A;  Pre op-Anemia, Melena, History of Polyps  Post op-To Cecum/TI, Polyp, Poor Prep   • CORONARY ARTERY BYPASS GRAFT  2001   • ENDOSCOPY N/A 2020    Procedure: ESOPHAGOGASTRODUODENOSCOPY with biopsies;  Surgeon: Amanda Lovelace MD;  Location: [facility] ENDOSCOPY;  Service: Gastroenterology;  Laterality: N/A;  pre-anemia, dark stools  post-esophagitis, hiatal hernia   • ENDOSCOPY N/A 9/15/2020    Procedure: ESOPHAGOGASTRODUODENOSCOPY WITH BIOPSIES;  Surgeon: Jose Enrique Louie MD;  Location: [facility] ENDOSCOPY;  Service: Gastroenterology;  Laterality: N/A;  pre: history of melena and esophagitis  post: mild esophagitis and gastritis, small hiatal hernia   • HERNIA REPAIR Left 2019   • TONSILLECTOMY     • VASECTOMY       Family History   Problem Relation Age of Onset   • Diabetes Mother    • Hypertension Mother    • Macular degeneration Mother    • Alcohol abuse Father    • Cancer Father         lung,  age 65   • Heart disease Father    • Alcohol abuse Brother    • Cirrhosis Brother          age 50   • Liver cancer Brother 45   • Diabetes Son    • Kidney failure Son    • Autism Son    • Malig Hyperthermia Neg Hx       Social History     Tobacco Use   • Smoking status: Never Smoker   • Smokeless tobacco: Never Used   • Tobacco comment: daily caffeine   Vaping Use   • Vaping Use: Never used   Substance Use Topics   • Alcohol use: No     Comment: last drink 2 months ago   • Drug use: No     No current facility-administered medications on file prior to encounter.     Current Outpatient Medications on File Prior to Encounter   Medication Sig Dispense Refill   • aspirin 81 MG EC tablet Take 81 mg by mouth Daily. INSTR TO FOLLOW MD GUIDELINE ON WHEN/IF TO HOLD DAY OF SURGERY     • atorvastatin (LIPITOR) 20 MG tablet Take 20 mg by mouth every night at bedtime.     • Cholecalciferol (VITAMIN D3) 5000 units capsule capsule Take 5,000 Units by mouth Daily.     • Eliquis 2.5 MG tablet tablet TAKE 1 TABLET BY MOUTH EVERY TWELVE HOURS (Patient taking differently: Take 2.5 mg by mouth Daily.) 60 tablet 0   • Insulin Glargine (BASAGLAR KWIKPEN) 100 UNIT/ML injection pen Inject 4 Units under the skin into the appropriate area as directed Every Night. Patient says he does not take consistently (Patient taking differently: Inject 4 Units under the skin into the appropriate area as directed Every Night.) 15 mL 3   • insulin glulisine (Apidra) 100 UNIT/ML injection Inject 2 Units under the skin into the appropriate area as directed 3 (Three) Times a Day Before Meals. Patient says he does not take consistently (Patient taking differently: Inject 2 Units under the skin into the appropriate area as directed 3 (Three) Times a Day Before Meals. HAS NOT STARTED TAKING YET, WAITING FOR SUPPLIES) 15 mL 3   • levothyroxine (SYNTHROID, LEVOTHROID) 75 MCG tablet Take 1 tablet by mouth Daily. 30 tablet 5   • memantine (NAMENDA) 5 MG tablet 1 tab daily for 1 week, then 1 tab twice daily for 1 week, then 2 tabs in the morning and 1 tab at night for 1 week, then 2 tabs twice daily (Patient taking differently: Take 5 mg by mouth 2 (Two) Times a Day. 1 tab daily  for 1 week, then 1 tab twice daily for 1 week, then 2 tabs in the morning and 1 tab at night for 1 week, then 2 tabs twice daily) 120 tablet 0   • pantoprazole (PROTONIX) 40 MG EC tablet Take 40 mg by mouth Daily.     • tamsulosin (FLOMAX) 0.4 MG capsule 24 hr capsule Take 1 capsule by mouth Every Night.     • torsemide (DEMADEX) 100 MG tablet Take 1 tablet by mouth Daily for 30 days. 30 tablet 0   • vitamin C (ASCORBIC ACID) 250 MG tablet Take 1,000 mg by mouth Daily.       Allergies   Allergen Reactions   • Prozac [Fluoxetine Hcl] Other (See Comments)     shaking       Objective    Objective     Vital Signs  Temp:  [97.8 °F (36.6 °C)-98.3 °F (36.8 °C)] 98.3 °F (36.8 °C)  Heart Rate:  [59-90] 62  Resp:  [16-18] 16  BP: ()/(66-98) 137/78  SpO2:  [96 %-100 %] 96 %  on   ;   Device (Oxygen Therapy): room air  Body mass index is 22.3 kg/m².    Physical Exam  Vitals reviewed.   Constitutional:       Appearance: He is well-developed. He is ill-appearing (chronic ).   HENT:      Head: Normocephalic and atraumatic.   Cardiovascular:      Rate and Rhythm: Normal rate and regular rhythm.   Pulmonary:      Effort: Pulmonary effort is normal. No respiratory distress.      Breath sounds: Normal breath sounds.   Abdominal:      General: Bowel sounds are normal. There is no distension.      Palpations: Abdomen is soft. There is no mass.      Tenderness: There is no abdominal tenderness.      Hernia: No hernia is present.   Skin:     General: Skin is warm and dry.   Neurological:      Mental Status: He is alert and oriented to person, place, and time.      Comments: Flat affect, disheveled   Psychiatric:         Behavior: Behavior normal.         Thought Content: Thought content normal.         Judgment: Judgment normal.         Results Review:  I reviewed the patient's new clinical results.  I reviewed the patient's new imaging results and agree with the interpretation.  I reviewed the patient's other test results and agree  with the interpretation  I personally viewed and interpreted the patient's EKG/Telemetry data  Discussed with ED provider.    Lab Results (last 24 hours)     Procedure Component Value Units Date/Time    CK [[id number]]  (Normal) Collected: 09/15/22 2207    Specimen: Blood Updated: 09/15/22 2243     Creatine Kinase 57 U/L     CBC & Differential [[id number]]  (Abnormal) Collected: 09/15/22 2207    Specimen: Blood Updated: 09/15/22 2219    Narrative:      The following orders were created for panel order CBC & Differential.  Procedure                               Abnormality         Status                     ---------                               -----------         ------                     CBC Auto Differential[[id number]]        Abnormal            Final result                 Please view results for these tests on the individual orders.    Comprehensive Metabolic Panel [[id number]]  (Abnormal) Collected: 09/15/22 2207    Specimen: Blood Updated: 09/15/22 2243     Glucose 103 mg/dL      BUN 75 mg/dL      Creatinine 4.50 mg/dL      Sodium 141 mmol/L      Potassium 4.1 mmol/L      Chloride 101 mmol/L      CO2 24.3 mmol/L      Calcium 9.5 mg/dL      Total Protein 6.4 g/dL      Albumin 3.90 g/dL      ALT (SGPT) 30 U/L      AST (SGOT) 26 U/L      Alkaline Phosphatase 76 U/L      Total Bilirubin 0.3 mg/dL      Globulin 2.5 gm/dL      A/G Ratio 1.6 g/dL      BUN/Creatinine Ratio 16.7     Anion Gap 15.7 mmol/L      eGFR 13.6 mL/min/1.73      Comment: <15 Indicative of kidney failure       Narrative:      GFR Normal >60  Chronic Kidney Disease <60  Kidney Failure <15      Troponin [[id number]]  (Abnormal) Collected: 09/15/22 2207    Specimen: Blood Updated: 09/15/22 2248     Troponin T 0.133 ng/mL     Narrative:      Troponin T Reference Range:  <= 0.03 ng/mL-   Negative for AMI  >0.03 ng/mL-     Abnormal for myocardial necrosis.  Clinicians would have to utilize clinical acumen, EKG, Troponin and serial changes to determine  if it is an Acute Myocardial Infarction or myocardial injury due to an underlying chronic condition.       Results may be falsely decreased if patient taking Biotin.      Lactic Acid, Plasma [[id number]]  (Normal) Collected: 09/15/22 2207    Specimen: Blood Updated: 09/15/22 2234     Lactate 1.0 mmol/L     Ethanol [[id number]] Collected: 09/15/22 2207    Specimen: Blood Updated: 09/15/22 2243     Ethanol <10 mg/dL      Ethanol % <0.010 %     CBC Auto Differential [[id number]]  (Abnormal) Collected: 09/15/22 2207    Specimen: Blood Updated: 09/15/22 2219     WBC 7.82 10*3/mm3      RBC 4.33 10*6/mm3      Hemoglobin 11.2 g/dL      Hematocrit 35.0 %      MCV 80.8 fL      MCH 25.9 pg      MCHC 32.0 g/dL      RDW 14.0 %      RDW-SD 41.4 fl      MPV 10.4 fL      Platelets 242 10*3/mm3      Neutrophil % 77.0 %      Lymphocyte % 15.2 %      Monocyte % 6.8 %      Eosinophil % 0.6 %      Basophil % 0.1 %      Immature Grans % 0.3 %      Neutrophils, Absolute 6.02 10*3/mm3      Lymphocytes, Absolute 1.19 10*3/mm3      Monocytes, Absolute 0.53 10*3/mm3      Eosinophils, Absolute 0.05 10*3/mm3      Basophils, Absolute 0.01 10*3/mm3      Immature Grans, Absolute 0.02 10*3/mm3      nRBC 0.0 /100 WBC     Urinalysis With Culture If Indicated - Urine, Clean Catch [[id number]]  (Abnormal) Collected: 09/15/22 2251    Specimen: Urine, Clean Catch Updated: 09/15/22 2318     Color, UA Yellow     Appearance, UA Clear     pH, UA <=5.0     Specific Gravity, UA 1.013     Glucose, UA Negative     Ketones, UA Negative     Bilirubin, UA Negative     Blood, UA Negative     Protein,  mg/dL (2+)     Leuk Esterase, UA Negative     Nitrite, UA Negative     Urobilinogen, UA 0.2 E.U./dL    Narrative:      In absence of clinical symptoms, the presence of pyuria, bacteria, and/or nitrites on the urinalysis result does not correlate with infection.    Urine Drug Screen - Urine, Clean Catch [[id number]]  (Normal) Collected: 09/15/22 2251    Specimen:  Urine, Clean Catch Updated: 09/15/22 2320     Amphet/Methamphet, Screen Negative     Barbiturates Screen, Urine Negative     Benzodiazepine Screen, Urine Negative     Cocaine Screen, Urine Negative     Opiate Screen Negative     THC, Screen, Urine Negative     Methadone Screen, Urine Negative     Oxycodone Screen, Urine Negative    Narrative:      Negative Thresholds Per Drugs Screened:    Amphetamines                 500 ng/ml  Barbiturates                 200 ng/ml  Benzodiazepines              100 ng/ml  Cocaine                      300 ng/ml  Methadone                    300 ng/ml  Opiates                      300 ng/ml  Oxycodone                    100 ng/ml  THC                           50 ng/ml    The Normal Value for all drugs tested is negative. This report includes final unconfirmed screening results to be used for medical treatment purposes only. Unconfirmed results must not be used for non-medical purposes such as employment or legal testing. Clinical consideration should be applied to any drug of abuse test, particularly when unconfirmed results are used.            POC Glucose Once [[id number]]  (Normal) Collected: 09/15/22 2251    Specimen: Blood Updated: 09/15/22 2252     Glucose 101 mg/dL      Comment: Meter: [id number] : [id number] [facility] [place] [employer]       Urinalysis, Microscopic Only - Urine, Clean Catch [[id number]] Collected: 09/15/22 2251    Specimen: Urine, Clean Catch Updated: 09/15/22 2318     RBC, UA 0-2 /HPF      WBC, UA 0-2 /HPF      Comment: Urine culture not indicated.        Bacteria, UA None Seen /HPF      Squamous Epithelial Cells, UA 0-2 /HPF      Hyaline Casts, UA 3-6 /LPF      Methodology Automated Microscopy    Basic Metabolic Panel [[id number]]  (Abnormal) Collected: 09/16/22 0434    Specimen: Blood Updated: 09/16/22 0729     Glucose 228 mg/dL      BUN 73 mg/dL      Creatinine 4.19 mg/dL      Sodium 138 mmol/L      Potassium 3.8 mmol/L      Chloride 102 mmol/L      CO2 25.9  mmol/L      Calcium 8.8 mg/dL      BUN/Creatinine Ratio 17.4     Anion Gap 10.1 mmol/L      eGFR 14.8 mL/min/1.73      Comment: <15 Indicative of kidney failure       Narrative:      GFR Normal >60  Chronic Kidney Disease <60  Kidney Failure <15      CBC Auto Differential [[id number]]  (Abnormal) Collected: 09/16/22 0434    Specimen: Blood Updated: 09/16/22 0537     WBC 6.35 10*3/mm3      RBC 3.80 10*6/mm3      Hemoglobin 10.1 g/dL      Hematocrit 31.5 %      MCV 82.9 fL      MCH 26.6 pg      MCHC 32.1 g/dL      RDW 14.0 %      RDW-SD 42.0 fl      MPV 11.1 fL      Platelets 233 10*3/mm3      Neutrophil % 70.8 %      Lymphocyte % 18.6 %      Monocyte % 9.1 %      Eosinophil % 1.1 %      Basophil % 0.2 %      Immature Grans % 0.2 %      Neutrophils, Absolute 4.50 10*3/mm3      Lymphocytes, Absolute 1.18 10*3/mm3      Monocytes, Absolute 0.58 10*3/mm3      Eosinophils, Absolute 0.07 10*3/mm3      Basophils, Absolute 0.01 10*3/mm3      Immature Grans, Absolute 0.01 10*3/mm3      nRBC 0.0 /100 WBC     Lactic Acid, Plasma [[id number]]  (Normal) Collected: 09/16/22 0434    Specimen: Blood Updated: 09/16/22 0730     Lactate 0.9 mmol/L     POC Glucose Once [[id number]]  (Abnormal) Collected: 09/16/22 0558    Specimen: Blood Updated: 09/16/22 0559     Glucose 211 mg/dL      Comment: Meter: [id number] : [id number] Hany Hayley JAMES             Imaging Results (Last 24 Hours)     Procedure Component Value Units Date/Time    CT Head Without Contrast [[id number]] Collected: 09/16/22 0003     Updated: 09/16/22 0010    Narrative:      CT HEAD WITHOUT CONTRAST     HISTORY: Fall with injury     COMPARISON: The first 2022     TECHNIQUE: Axial CT imaging was obtained through the brain. No IV  contrast was administered.     FINDINGS:  No acute intracranial hemorrhage is seen there is advanced atrophy for  the age of 67. There is periventricular and deep white matter  microangiopathic change. There is no midline shift or mass effect.  There  is an old right cerebellar infarct. Additional old infarcts are  suspected within the bilateral corona radiata. No calvarial fracture is  seen. Mucous retention cyst is seen within the right maxillary sinus.  Suspected sebaceous cysts are noted within the scalp.       Impression:      No acute intracranial findings.     Radiation dose reduction techniques were utilized, including automated  exposure control and exposure modulation based on body size.     This report was finalized on 9/16/2022 12:07 AM by Dr. Alice Allen M.D.             Results for orders placed during the hospital encounter of 02/01/22    Adult Transthoracic Echo Complete W/ Cont if Necessary Per Protocol    Interpretation Summary  · There is severe hypokinesis of the mid to distal anteroseptum  · Left ventricular ejection fraction appears to be 46 - 50%.  · Left ventricular wall thickness is consistent with mild concentric hypertrophy.  · Left ventricular diastolic function is consistent with (grade I) impaired relaxation.  · Normal right ventricular cavity size and systolic function noted.  · There is no evidence of pericardial effusion.      ECG 12 Lead   Preliminary Result   HEART RATE= 61  bpm   RR Interval= 984  ms   NJ Interval= 203  ms   P Horizontal Axis= -2  deg   P Front Axis= 60  deg   QRSD Interval= 154  ms   QT Interval= 480  ms   QRS Axis= -90  deg   T Wave Axis= 76  deg   - ABNORMAL ECG -   Sinus rhythm   Right bundle branch block   Anterior infarct, age indeterminate   Electronically Signed By:    Date and Time of Study: 2022-09-15 22:19:31           Assessment/Plan     Active Hospital Problems    Diagnosis  POA   • **Multiple falls [R29.6]  Not Applicable   • Dehydration [E86.0]  Yes   • Paroxysmal atrial fibrillation (HCC) [I48.0]  Yes   • Chronic anticoagulation [Z79.01]  Not Applicable   • Acute renal failure superimposed on stage 4 chronic kidney disease (HCC) [N17.9, N18.4]  Yes   • CKD (chronic kidney disease)  stage 4, GFR 15-29 ml/min ([facility]) [N18.4]  Yes   • Chronically elevated troponin [R77.8]  Yes   • Coronary artery disease involving native coronary artery of native heart without angina pectoris [I25.10]  Yes   • YAW (obstructive sleep apnea) [G47.33]  Yes   • SYLVAIN (acute kidney injury) ([facility]) [N17.9]  Unknown   • Recurrent strokes ([facility]) [I63.9]  Yes   • Acquired hypothyroidism [E03.9]  Yes   • Benign prostatic hyperplasia with nocturia [N40.1, R35.1]  Yes   • High blood pressure associated with diabetes ([facility]) [E11.59, I15.2]  Yes   • Type 2 diabetes mellitus with stage 5 chronic kidney disease not on chronic dialysis, with long-term current use of insulin ([facility]) [E11.22, N18.5, Z79.4]  Not Applicable      Resolved Hospital Problems   No resolved problems to display.       Mr. Mo is a 67 y.o. numerous comorbidities with a recent admission for general weakness that presents with continued decline, SYLVAIN on CKD and dehydration.         Acute on chronic kidney disease 4/ dehydration  -Creatinine elevated 4.5 usually 3.5 with orthostatic positive and poor po intake. Crt decreased with IVF  -Nephrology consult     Impaired mobility/ recurrent falls/history CVA  PT OT, CPP consult for placement   Consult neurology given progressive weakness    chronic combined systolic heart failure  - hold lasix, dehydration and SYLVAIN on admission        Type 2 diabetes  -Check A1c.  Does not check glucose regularly at home.  Correctional insulin and avoid hypoglycemia.     Paroxysmal atrial fibrillation  -Heart rate controlled  -Continue eliquis     Anemia  -Anemia of chronic disease.  Monitor.  Check B12 folate given weakness .    Chronically elevated troponin  -In the setting of CKD and SYLVAIN.  Denies chest pain, troponin at baseline.  No acute ischemic change on EKG.    Hypothyroidism  -Check TSH continue replacement.    · I discussed the patient's findings and my recommendations with patient, family and Dr Tse.    VTE Prophylaxis -  Eliquis (home med).  Code Status - Full code.     Resume home medications when med rec was completed by nursing staff.    JI Adler  [facility]  09/16/22  09:03 EDT

## 2022-09-16 NOTE — ED NOTES
Nursing report ED to floor  Carlito Mo  67 y.o.  male    HPI :   Chief Complaint   Patient presents with   • Fall   • Weakness - Generalized       Admitting doctor:   Delta Marie MD    Admitting diagnosis:   The primary encounter diagnosis was Multiple falls. Diagnoses of Fall in home, initial encounter, Injury of head, initial encounter, Chronic anticoagulation, Acute renal failure superimposed on chronic kidney disease, unspecified CKD stage, unspecified acute renal failure type (HCC), Volume depletion, Orthostatic hypotension, and Chronic troponin elevation were also pertinent to this visit.    Code status:   Current Code Status     Date Active Code Status Order ID Comments User Context       9/16/2022 0001 CPR (Attempt to Resuscitate) [id number]  Maya Archibald, APRN ED     Advance Care Planning Activity      Questions for Current Code Status     Question Answer    Code Status (Patient has no pulse and is not breathing) CPR (Attempt to Resuscitate)    Medical Interventions (Patient has pulse or is breathing) Full Support          Allergies:   Prozac [fluoxetine hcl]    Intake and Output    Intake/Output Summary (Last 24 hours) at 9/16/2022 0047  Last data filed at 9/16/2022 0017  Gross per 24 hour   Intake 545.83 ml   Output --   Net 545.83 ml       Weight:   There were no vitals filed for this visit.    Most recent vitals:   Vitals:    09/15/22 2339 09/16/22 0001 09/16/22 0031 09/16/22 0039   BP:  157/98 173/85    Patient Position:       Pulse: 64 62 68 68   Resp:       Temp:       SpO2: 99% 100% 98% 97%   Height:           Active LDAs/IV Access:   Lines, Drains & Airways     Active LDAs     Name Placement date Placement time Site Days    Peripheral IV 09/15/22 2208 Left Antecubital 09/15/22  2208  Antecubital  less than 1                Labs (abnormal labs have a star):   Labs Reviewed   COMPREHENSIVE METABOLIC PANEL - Abnormal; Notable for the following components:       Result Value    Glucose 103  (*)     BUN 75 (*)     Creatinine 4.50 (*)     Anion Gap 15.7 (*)     eGFR 13.6 (*)     All other components within normal limits    Narrative:     GFR Normal >60  Chronic Kidney Disease <60  Kidney Failure <15     URINALYSIS W/ CULTURE IF INDICATED - Abnormal; Notable for the following components:    Protein,  mg/dL (2+) (*)     All other components within normal limits    Narrative:     In absence of clinical symptoms, the presence of pyuria, bacteria, and/or nitrites on the urinalysis result does not correlate with infection.   TROPONIN (IN-HOUSE) - Abnormal; Notable for the following components:    Troponin T 0.133 (*)     All other components within normal limits    Narrative:     Troponin T Reference Range:  <= 0.03 ng/mL-   Negative for AMI  >0.03 ng/mL-     Abnormal for myocardial necrosis.  Clinicians would have to utilize clinical acumen, EKG, Troponin and serial changes to determine if it is an Acute Myocardial Infarction or myocardial injury due to an underlying chronic condition.       Results may be falsely decreased if patient taking Biotin.     CBC WITH AUTO DIFFERENTIAL - Abnormal; Notable for the following components:    Hemoglobin 11.2 (*)     Hematocrit 35.0 (*)     MCH 25.9 (*)     Neutrophil % 77.0 (*)     Lymphocyte % 15.2 (*)     All other components within normal limits   CK - Normal   LACTIC ACID, PLASMA - Normal   URINE DRUG SCREEN - Normal    Narrative:     Negative Thresholds Per Drugs Screened:    Amphetamines                 500 ng/ml  Barbiturates                 200 ng/ml  Benzodiazepines              100 ng/ml  Cocaine                      300 ng/ml  Methadone                    300 ng/ml  Opiates                      300 ng/ml  Oxycodone                    100 ng/ml  THC                           50 ng/ml    The Normal Value for all drugs tested is negative. This report includes final unconfirmed screening results to be used for medical treatment purposes only. Unconfirmed  results must not be used for non-medical purposes such as employment or legal testing. Clinical consideration should be applied to any drug of abuse test, particularly when unconfirmed results are used.           POCT GLUCOSE FINGERSTICK - Normal   ETHANOL   URINALYSIS, MICROSCOPIC ONLY   BASIC METABOLIC PANEL   CBC WITH AUTO DIFFERENTIAL   LACTIC ACID, PLASMA   POCT GLUCOSE FINGERSTICK   POCT GLUCOSE FINGERSTICK   POCT GLUCOSE FINGERSTICK   POCT GLUCOSE FINGERSTICK   CBC AND DIFFERENTIAL    Narrative:     The following orders were created for panel order CBC & Differential.  Procedure                               Abnormality         Status                     ---------                               -----------         ------                     CBC Auto Differential[[id number]]        Abnormal            Final result                 Please view results for these tests on the individual orders.       EKG:   ECG 12 Lead   Preliminary Result   HEART RATE= 61  bpm   RR Interval= 984  ms   MN Interval= 203  ms   P Horizontal Axis= -2  deg   P Front Axis= 60  deg   QRSD Interval= 154  ms   QT Interval= 480  ms   QRS Axis= -90  deg   T Wave Axis= 76  deg   - ABNORMAL ECG -   Sinus rhythm   Right bundle branch block   Anterior infarct, age indeterminate   Electronically Signed By:    Date and Time of Study: 2022-09-15 22:19:31          Meds given in ED:   Medications   sodium chloride 0.9 % flush 10 mL (has no administration in time range)   sodium chloride 0.9 % flush 10 mL (has no administration in time range)   sodium chloride 0.9 % flush 10 mL (has no administration in time range)   dextrose (GLUTOSE) oral gel 15 g (has no administration in time range)   dextrose (D50W) (25 g/50 mL) IV injection 25 g (has no administration in time range)   glucagon (human recombinant) (GLUCAGEN DIAGNOSTIC) injection 1 mg (has no administration in time range)   acetaminophen (TYLENOL) tablet 650 mg (has no administration in time range)      Or   acetaminophen (TYLENOL) 160 MG/5ML solution 650 mg (has no administration in time range)     Or   acetaminophen (TYLENOL) suppository 650 mg (has no administration in time range)   sodium chloride 0.9 % infusion (100 mL/hr Intravenous New Bag 9/16/22 0016)   insulin lispro (ADMELOG) injection 0-7 Units (has no administration in time range)   ondansetron (ZOFRAN) injection 4 mg (has no administration in time range)   sodium chloride 0.9 % bolus 500 mL (0 mL Intravenous Stopped 9/15/22 0103)       Imaging results:  CT Head Without Contrast    Result Date: 9/16/2022  No acute intracranial findings.  Radiation dose reduction techniques were utilized, including automated exposure control and exposure modulation based on body size.  This report was finalized on 9/16/2022 12:07 AM by Dr. Alice Allen M.D.        Ambulatory status:   - assist x1. Uses cane at baseline    Social issues:   Social History     Socioeconomic History   • Marital status:      Spouse name: Lissette   • Number of children: 3   • Years of education: college   Tobacco Use   • Smoking status: Never Smoker   • Smokeless tobacco: Never Used   • Tobacco comment: daily caffeine   Vaping Use   • Vaping Use: Never used   Substance and Sexual Activity   • Alcohol use: No     Comment: last drink 2 months ago   • Drug use: No   • Sexual activity: Defer       NIH Stroke Scale:        Nursing report ED to floor:  Pt to er with c/o generalized weakness x several days and multiple falls. Pt states this evening when standing to get out of bed, his legs gave out from underneath him. Pt denies any LOC. Pt aox4,on room air. Pt states he does use cane to help ambulate when at baseline.

## 2022-09-16 NOTE — PROGRESS NOTES
Discharge Planning Assessment  [facility]     Patient Name: Carlito Mo  MRN: [id number]  Today's Date: 9/16/2022    Admit Date: 9/15/2022     Discharge Needs Assessment    No documentation.                Discharge Plan     Row Name 09/16/22 1620       Plan    Plan [facility]  is following for skilled rehab and may have a bed at [facility] on Monday    Plan Comments [facility] is following for skilled rehab and may have a bed at [facility] on Monday. Yunior BENZ    Row Name 09/16/22 1607       Plan    Plan Patient is agreeable with [facility] skilled rehab semi-private room    Plan Comments Spoke with patient at bedside he is agreeable with [facility] semi-private room for skilled rehab. Patient stated his family can transport. Packet in [place]. Yunior BENZN              Continued Care and Services - Admitted Since 9/15/2022     Destination     Service Provider Request Status Selected Services Address Phone Fax Patient Preferred    [facility]  Pending - Request Sent N/A [address] [facility] [address] [phone] --    [facility]  Pending - Request Sent N/A [address] [facility] [address] [phone] --          Home Medical Care     Service Provider Request Status Selected Services Address Phone Fax Patient Preferred    [facility]  Accepted N/A [address] [address] [phone] --                 Demographic Summary    No documentation.                Functional Status    No documentation.                Psychosocial    No documentation.                Abuse/Neglect    No documentation.                Legal    No documentation.                Substance Abuse    No documentation.                Patient Forms    No documentation.                   Norah Wei, RN

## 2022-09-16 NOTE — PLAN OF CARE
Goal Outcome Evaluation:              Outcome Evaluation: Pt aox3, VSS, no c/o pain this shift. Will CTM the rest of my shift

## 2022-09-16 NOTE — CONSULTS
[facility] Consult Note      Patient Name: Carlito Mo  : 1955  MRN: [id number]  Primary Care Physician:  Florencia Caballero MD  Referring Physician: Delta Marie MD  Date of admission: 9/15/2022    Subjective     Reason for Consult:  CKD stage 4 with elevated creatinine    HPI:   Carlito oM is a 67 y.o. male who got admitted on September 15, 2022.    He has history of CKD stage 4, progressive diabetic nephropathy with proteinuria, HTN, DM 2, AFIB on AC, BPH and presented to ER c/o increasing generalized weakness over the last 3 to 4 days with recurrent falls for the last 2 days before admission.  He did hit his head the day before admission but denies significant headache.  He fell again on September 15, 2022 and thus EMS was called.    He sees Dr Bryan Huddleston in our office.      He feels a little bit better today but still significantly weak.  He does not have any leg swelling or shortness of breath.  He does not have any dysuria, urgency, hematuria or urine incontinence.  No fever or chills.    His baseline creatinine is around 3.5-3.9 at least since 2022.  He got admitted with a creatinine of 4.5 and then it is 4.19 today (2022).    Review of Systems:   14 point review of systems is otherwise negative except for mentioned above on HPI    Personal History     Past Medical History:   Diagnosis Date   • Acquired hypothyroidism    • Acute congestive heart failure (HCC)    • Acute respiratory failure with hypoxia (HCC)    • Acute sinusitis    • SYLVAIN (acute kidney injury) (HCC)     on CKD   • Anemia    • Arthritis    • CAD (coronary artery disease)    • Cancer (HCC)     skin   • Chronic combined systolic and diastolic congestive heart failure (HCC)    • Chronic ischemic heart disease    • Chronic kidney disease (CKD)    • CKD (chronic kidney disease)    • COPD (chronic obstructive pulmonary disease) (HCC)    • Depression    • Diabetes mellitus type 2,  uncontrolled, without complications    • Diabetic neuropathy (HCC)    • Disease of thyroid gland    • Elevated cholesterol    • Fatigue    • Frequent PVCs    • Generalized weakness    • GERD (gastroesophageal reflux disease)    • Heart attack (HCC)    • History of coronary artery disease     with remote history of bypass surgery in 2001   • Hyperlipidemia    • Hypertension    • Hypertensive encephalopathy    • Mental status change     Acute   • NO DEVICE/RECALLED    • Pleural effusion    • Pneumonia    • Poor historian    • RBBB (right bundle branch block)    • Stroke (HCC)     POOR VISION   • TIA (transient ischemic attack)    • Type 2 diabetes mellitus (HCC)    • Urinary retention    • Vitamin D deficiency        Past Surgical History:   Procedure Laterality Date   • APPENDECTOMY N/A 02/14/2016    Dr. Alexey Dodson   • ARTERIOVENOUS FISTULA/SHUNT SURGERY Right 6/3/2022    Procedure: RIGHT FOREARM ARTERIOVENOUS FISTULA PLACEMENT RADIOCEPHALIC WITH CEPHALIC VEIN TRANSPOSITION;  Surgeon: Eliseo Willis MD;  Location: [facility] MAIN OR;  Service: Vascular;  Laterality: Right;   • COLONOSCOPY      over 20 years ago.  no polyps    • COLONOSCOPY N/A 9/18/2018    Procedure: COLONOSCOPY;  Surgeon: Jose Enrique Louie MD;  Location: [facility] ENDOSCOPY;  Service: Gastroenterology   • COLONOSCOPY N/A 9/19/2018    IH, EH, polyps (TAs w/low grade dysplasia)   • COLONOSCOPY N/A 6/1/2020    Procedure: COLONOSCOPY;  Surgeon: Jose Enrique Louie MD;  Location: [facility] ENDOSCOPY;  Service: Gastroenterology;  Laterality: N/A;  Pre op-Anemia, Melena, History of Polyps  Post op-To Cecum/TI, Polyp, Poor Prep   • CORONARY ARTERY BYPASS GRAFT  11/2001   • ENDOSCOPY N/A 5/29/2020    Procedure: ESOPHAGOGASTRODUODENOSCOPY with biopsies;  Surgeon: Amanda Lovelace MD;  Location: [facility] ENDOSCOPY;  Service: Gastroenterology;  Laterality: N/A;  pre-anemia, dark stools  post-esophagitis, hiatal hernia   • ENDOSCOPY N/A 9/15/2020    Procedure:  ESOPHAGOGASTRODUODENOSCOPY WITH BIOPSIES;  Surgeon: Jose Enrique Louie MD;  Location: [facility] ENDOSCOPY;  Service: Gastroenterology;  Laterality: N/A;  pre: history of melena and esophagitis  post: mild esophagitis and gastritis, small hiatal hernia   • HERNIA REPAIR Left 12/05/2019   • TONSILLECTOMY     • VASECTOMY         Family History: family history includes Alcohol abuse in his brother and father; Autism in his son; Cancer in his father; Cirrhosis in his brother; Diabetes in his mother and son; Heart disease in his father; Hypertension in his mother; Kidney failure in his son; Liver cancer (age of onset: 45) in his brother; Macular degeneration in his mother.    Social History:  reports that he has never smoked. He has never used smokeless tobacco. He reports that he does not drink alcohol and does not use drugs.    Home Medications:  Prior to Admission medications    Medication Sig Start Date End Date Taking? Authorizing Provider   aspirin 81 MG EC tablet Take 81 mg by mouth Daily. INSTR TO FOLLOW MD GUIDELINE ON WHEN/IF TO HOLD DAY OF SURGERY    ProviderAlonzo MD   atorvastatin (LIPITOR) 20 MG tablet Take 20 mg by mouth every night at bedtime. 5/24/22   Alonzo Dean MD   buPROPion XL (WELLBUTRIN XL) 300 MG 24 hr tablet Take 1 tablet by mouth Every Morning for 30 days. 3/9/22 4/8/22  Josue Pickens MD   carvedilol (COREG) 3.125 MG tablet Take 1 tablet by mouth 2 (Two) Times a Day With Meals for 30 days. 2/4/22 3/6/22  Josue Pickens MD   CHLORHEXIDINE GLUCONATE CLOTH EX Apply  topically. USE AS DIRECTED    Alonzo Dean MD   Cholecalciferol (VITAMIN D3) 5000 units capsule capsule Take 5,000 Units by mouth Daily.    Alonzo Dean MD   Eliquis 2.5 MG tablet tablet TAKE 1 TABLET BY MOUTH EVERY TWELVE HOURS 6/30/22   Alisia Paula APRN   HYDROcodone-acetaminophen (NORCO) 5-325 MG per tablet Take 1-2 tablets by mouth Every 6 (Six) Hours As Needed (Pain). 6/3/22   Lazaro  Elisoe CASTELLANO MD   Insulin Glargine (BASAGLAR KWIKPEN) 100 UNIT/ML injection pen Inject 4 Units under the skin into the appropriate area as directed Every Night. Patient says he does not take consistently  Patient taking differently: Inject 4 Units under the skin into the appropriate area as directed Every Night. 2/8/22   Erica Welsh MD   insulin glulisine (Apidra) 100 UNIT/ML injection Inject 2 Units under the skin into the appropriate area as directed 3 (Three) Times a Day Before Meals. Patient says he does not take consistently  Patient taking differently: Inject 2 Units under the skin into the appropriate area as directed 3 (Three) Times a Day Before Meals. HAS NOT STARTED TAKING YET, WAITING FOR SUPPLIES 2/8/22   Erica Welsh MD   levothyroxine (SYNTHROID, LEVOTHROID) 75 MCG tablet Take 1 tablet by mouth Daily. 2/8/22   Erica Welsh MD   memantine (NAMENDA) 5 MG tablet 1 tab daily for 1 week, then 1 tab twice daily for 1 week, then 2 tabs in the morning and 1 tab at night for 1 week, then 2 tabs twice daily 6/29/22   Alisia Paula APRN   tamsulosin (FLOMAX) 0.4 MG capsule 24 hr capsule Take 1 capsule by mouth Every Night.    ProviderAlonzo MD   torsemide (DEMADEX) 20 MG tablet Take 3 tablets by mouth one time each day 6/23/22   ProviderAlonzo MD     Allergies:  Allergies   Allergen Reactions   • Prozac [Fluoxetine Hcl] Other (See Comments)     shaking       Objective     Vitals:   Temp:  [97.8 °F (36.6 °C)-98.3 °F (36.8 °C)] 98.3 °F (36.8 °C)  Heart Rate:  [59-90] 62  Resp:  [16-18] 16  BP: ()/(66-98) 137/78    Intake/Output Summary (Last 24 hours) at 9/16/2022 1014  Last data filed at 9/16/2022 0700  Gross per 24 hour   Intake 1073.83 ml   Output 200 ml   Net 873.83 ml       Physical Exam:    General Appearance: Chronically ill appearing WM with mild hypovolemia   Skin: warm and dry  HEENT: oral mucosa normal, nonicteric sclera  Neck: supple, no  JVD  Lungs: Good air entry bilaterally, no crackles or wheezes  Heart: RRR, normal S1 and S2  Abdomen: soft, nontender, nondistended  : no palpable bladder  Extremities: No edema; RUE radial AVF incision well healed, thrill/bruits present   Neuro: normal speech and mental status     Scheduled Meds:       IV Meds:   sodium chloride, 100 mL/hr, Last Rate: 100 mL/hr (09/16/22 0016)      Results Reviewed:   I have personally reviewed the results from the time of this admission to 9/16/2022 10:14 EDT     Lab Results   Component Value Date    GLUCOSE 228 (H) 09/16/2022    CALCIUM 8.8 09/16/2022     09/16/2022    K 3.8 09/16/2022    CO2 25.9 09/16/2022     09/16/2022    BUN 73 (H) 09/16/2022    CREATININE 4.19 (H) 09/16/2022    EGFRIFAFRI 74 12/12/2017    EGFRIFNONA 19 (L) 02/04/2022    BCR 17.4 09/16/2022    ANIONGAP 10.1 09/16/2022      Lab Results   Component Value Date    MG 2.1 06/05/2022    PHOS 4.7 (H) 07/29/2022    ALBUMIN 3.90 09/15/2022           Assessment / Plan     ASSESSMENT:  1. CKD stage 4 - Cr is up but slightly better today. Recent baseline creatinine is 3.5-3.9. He has progressive diabetic nephropathy, he might need dialysis initiation at relatively higher GFR in the future if he develops recurrent volume overload  2. Mild hypovolemia  3. HTN - BP adequate, amlodipine and carvedilol can be restarted   4. DM2 on insulin   5. Diastolic CHF on echo 2/3/22, EF 45 to 50%  6. Anemia of CKD, hemoglobin is currently acceptable at 10.1  7. BPH    PLAN:  I agree with IV fluids but the rate will be lowered to 75 cc an hour.  We have to be careful with overhydration since the patient tends to develop recurrent volume overload  Restart amlodipine and carvedilol  Restart Flomax  Monitor hemoglobin  Monitor kidney function and electrolytes  Hold torsemide for today    Thank you for involving us in the care of Carlito Mo.  Please feel free to call with any questions.    Ilir Briones,  MD  09/16/22  10:14 EDT    [facility]  [phone]

## 2022-09-16 NOTE — THERAPY EVALUATION
Patient Name: Carlito Mo  : 1955    MRN: [id number]                              Today's Date: 2022       Admit Date: 9/15/2022    Visit Dx:     ICD-10-CM ICD-9-CM   1. Multiple falls  R29.6 V15.88   2. Fall in home, initial encounter  W19.XXXA E888.9    Y92.009 E849.0   3. Injury of head, initial encounter  S09.90XA 959.01   4. Chronic anticoagulation  Z79.01 V58.61   5. Acute renal failure superimposed on chronic kidney disease, unspecified CKD stage, unspecified acute renal failure type ([facility])  N17.9 584.9    N18.9 585.9   6. Volume depletion  E86.9 276.50   7. Orthostatic hypotension  I95.1 458.0   8. Chronic troponin elevation  R77.8 790.6     Patient Active Problem List   Diagnosis   • Major depressive disorder with single episode, in partial remission ([facility])   • Other hyperlipidemia   • Type 2 diabetes mellitus with stage 5 chronic kidney disease not on chronic dialysis, with long-term current use of insulin ([facility])   • Vitamin D deficiency   • Erectile dysfunction   • Chronic fatigue   • Type 2 diabetes mellitus with ophthalmic complication ([facility])   • Skin lesion of chest wall   • Slow transit constipation   • Benign prostatic hyperplasia with nocturia   • History of basal cell carcinoma   • High blood pressure associated with diabetes ([facility])   • Acquired hypothyroidism   • Hypertensive urgency   • Recurrent strokes ([facility])   • Noncompliance w/medication treatment due to intermit use of medication   • Urinary retention   • Pneumonia   • Acute on chronic combined systolic and diastolic congestive heart failure ([facility])   • Acute respiratory failure with hypoxia ([facility])   • Suspected sleep apnea   • Pleural effusion   • YAW (obstructive sleep apnea)   • Coronary artery disease involving native coronary artery of native heart without angina pectoris   • Chronically elevated troponin   • Colon cancer screening   • Enlarged lymph nodes   • Functional gait abnormality   • History of myocardial infarction   •  Hospital discharge follow-up   • Insomnia   • Iron deficiency anemia   • Major depression, recurrent (HCC)   • Anemia, chronic renal failure, stage 3 (moderate) (HCC)   • Essential hypertension   • Adverse effect of iron and its compounds, initial encounter   • Acute on chronic renal insufficiency   • Debility   • Falls frequently   • Acute pain of right shoulder   • Acute on chronic anemia   • Heme positive stool   • Neurogenic orthostatic hypotension (HCC)   • Symptomatic anemia   • History of colon polyps   • Helicobacter pylori gastritis   • Esophagitis   • Sleep related hypoxia   • Embolic stroke (HCC)   • Patent foramen ovale   • Pleural effusion, bilateral   • Cardiomyopathy (HCC)   • Lower abdominal pain   • Diarrhea   • CKD (chronic kidney disease) stage 4, GFR 15-29 ml/min (HCC)   • Hypertension not at goal   • Memory loss due to medical condition   • Generalized weakness   • ERRONEOUS ENCOUNTER--DISREGARD   • Weakness   • Paroxysmal atrial fibrillation (HCC)   • Chronic anticoagulation   • Multiple falls   • Dehydration     Past Medical History:   Diagnosis Date   • Acquired hypothyroidism    • Acute congestive heart failure (HCC)    • Acute respiratory failure with hypoxia (HCC)    • Acute sinusitis    • SYLVAIN (acute kidney injury) (HCC)     on CKD   • Anemia    • Arthritis    • CAD (coronary artery disease)    • Cancer (HCC)     skin   • Chronic combined systolic and diastolic congestive heart failure (HCC)    • Chronic ischemic heart disease    • Chronic kidney disease (CKD)    • CKD (chronic kidney disease)    • COPD (chronic obstructive pulmonary disease) (HCC)    • Depression    • Diabetes mellitus type 2, uncontrolled, without complications    • Diabetic neuropathy (HCC)    • Disease of thyroid gland    • Elevated cholesterol    • Fatigue    • Frequent PVCs    • Generalized weakness    • GERD (gastroesophageal reflux disease)    • Heart attack (HCC)    • History of coronary artery disease     with  remote history of bypass surgery in 2001   • Hyperlipidemia    • Hypertension    • Hypertensive encephalopathy    • Mental status change     Acute   • NO DEVICE/RECALLED    • Pleural effusion    • Pneumonia    • Poor historian    • RBBB (right bundle branch block)    • Stroke (HCC)     POOR VISION   • TIA (transient ischemic attack)    • Type 2 diabetes mellitus (HCC)    • Urinary retention    • Vitamin D deficiency      Past Surgical History:   Procedure Laterality Date   • APPENDECTOMY N/A 02/14/2016    Dr. Alexey Dodson   • ARTERIOVENOUS FISTULA/SHUNT SURGERY Right 6/3/2022    Procedure: RIGHT FOREARM ARTERIOVENOUS FISTULA PLACEMENT RADIOCEPHALIC WITH CEPHALIC VEIN TRANSPOSITION;  Surgeon: Eliseo Willis MD;  Location: [facility] MAIN OR;  Service: Vascular;  Laterality: Right;   • COLONOSCOPY      over 20 years ago.  no polyps    • COLONOSCOPY N/A 9/18/2018    Procedure: COLONOSCOPY;  Surgeon: Jose Enrique Louie MD;  Location: [facility] ENDOSCOPY;  Service: Gastroenterology   • COLONOSCOPY N/A 9/19/2018    IH, EH, polyps (TAs w/low grade dysplasia)   • COLONOSCOPY N/A 6/1/2020    Procedure: COLONOSCOPY;  Surgeon: Jose Enrique Louie MD;  Location: [facility] ENDOSCOPY;  Service: Gastroenterology;  Laterality: N/A;  Pre op-Anemia, Melena, History of Polyps  Post op-To Cecum/TI, Polyp, Poor Prep   • CORONARY ARTERY BYPASS GRAFT  11/2001   • ENDOSCOPY N/A 5/29/2020    Procedure: ESOPHAGOGASTRODUODENOSCOPY with biopsies;  Surgeon: Amanda Lovelace MD;  Location: [facility] ENDOSCOPY;  Service: Gastroenterology;  Laterality: N/A;  pre-anemia, dark stools  post-esophagitis, hiatal hernia   • ENDOSCOPY N/A 9/15/2020    Procedure: ESOPHAGOGASTRODUODENOSCOPY WITH BIOPSIES;  Surgeon: Jose Enrique Louie MD;  Location: [facility] ENDOSCOPY;  Service: Gastroenterology;  Laterality: N/A;  pre: history of melena and esophagitis  post: mild esophagitis and gastritis, small hiatal hernia   • HERNIA REPAIR Left 12/05/2019   •  TONSILLECTOMY     • VASECTOMY        General Information     [facility] Name 09/16/22 1343          Physical Therapy Time and Intention    Document Type evaluation  -     Mode of Treatment physical therapy  -[facility] Name 09/16/22 1343          General Information    Patient Profile Reviewed yes  -EJ     Prior Level of Function independent:;ADL's;all household mobility  uses cane at times, reports 2 recent falls leading to this admission  -     Existing Precautions/Restrictions fall  -EJ     Barriers to Rehab previous functional deficit;medically complex  -[facility] Name 09/16/22 1343          Living Environment    People in Home spouse  -[facility] Name 09/16/22 1343          Stairs Within Home, Primary    Stairs, Within Home, Primary reports living in tri level home  -     Stair Railings, Within Home, Primary railings safe and in good condition  -[facility] Name 09/16/22 1343          Cognition    Orientation Status (Cognition) oriented x 4  -[facility] Name 09/16/22 1343          Safety Issues, Functional Mobility    Impairments Affecting Function (Mobility) strength;endurance/activity tolerance;postural/trunk control;balance  -           User Key  (r) = Recorded By, (t) = Taken By, (c) = Cosigned By    Initials Name Provider Type     Jessica Chino, PT Physical Therapist               Mobility     [facility] Name 09/16/22 1344          Bed Mobility    Bed Mobility supine-sit  -     Supine-Sit [place] (Bed Mobility) standby assist  -     Assistive Device (Bed Mobility) head of bed elevated  -EJ     Row Name 09/16/22 1344          Sit-Stand Transfer    Sit-Stand [place] (Transfers) verbal cues;contact guard  -     Assistive Device (Sit-Stand Transfers) walker, front-wheeled  -EJ     Row Name 09/16/22 1344          Gait/Stairs (Locomotion)    [place] Level (Gait) verbal cues;contact guard  -EJ     Assistive Device (Gait) walker, front-wheeled  -     Distance in Feet (Gait) 30  -EJ      Deviations/Abnormal Patterns (Gait) eli decreased;stride length decreased  -EJ     Bilateral Gait Deviations forward flexed posture  -EJ     Comment, (Gait/Stairs) slow pace, cues for upright posture, limited by fatigue  -EJ           User Key  (r) = Recorded By, (t) = Taken By, (c) = Cosigned By    Initials Name Provider Type    EJ Jessica Chino, PT Physical Therapist               Obj/Interventions     Row Name 09/16/22 1345          Range of Motion Comprehensive    General Range of Motion no range of motion deficits identified  -EJ     Row Name 09/16/22 1345          Strength Comprehensive (MMT)    Comment, General Manual Muscle Testing (MMT) Assessment generalized weakness  -EJ     Row Name 09/16/22 1345          Balance    Balance Assessment sitting static balance;standing static balance;standing dynamic balance  -EJ     Static Sitting Balance supervision  -EJ     Static Standing Balance contact guard  -EJ     Dynamic Standing Balance minimal assist  -EJ     Position/Device Used, Standing Balance walker, rolling  -EJ           User Key  (r) = Recorded By, (t) = Taken By, (c) = Cosigned By    Initials Name Provider Type     Jessica Chino, PT Physical Therapist               Goals/Plan     Row Name 09/16/22 1348          Transfer Goal 1 (PT)    Activity/Assistive Device (Transfer Goal 1, PT) transfers, all;walker, rolling  -EJ     [place] Level/Cues Needed (Transfer Goal 1, PT) standby assist  -EJ     Time Frame (Transfer Goal 1, PT) 1 week  -EJ     Row Name 09/16/22 1348          Gait Training Goal 1 (PT)    Activity/Assistive Device (Gait Training Goal 1, PT) gait (walking locomotion);walker, rolling  -EJ     [place] Level (Gait Training Goal 1, PT) standby assist  -EJ     Distance (Gait Training Goal 1, PT) 100  -EJ     Time Frame (Gait Training Goal 1, PT) 1 week  -EJ     Row Name 09/16/22 1348          Therapy Assessment/Plan (PT)    Planned Therapy Interventions (PT) balance  training;bed mobility training;gait training;home exercise program;patient/family education;stretching;strengthening;stair training;ROM (range of motion);transfer training  -EJ           User Key  (r) = Recorded By, (t) = Taken By, (c) = Cosigned By    Initials Name Provider Type    Jessica Weinberg, PT Physical Therapist               Clinical Impression     Row Name 09/16/22 1346          Pain    Pretreatment Pain Rating 0/10 - no pain  -     Row Name 09/16/22 1346          Plan of Care Review    Plan of Care Reviewed With patient  -EJ     Progress improving  -EJ     Outcome Evaluation Pt seen for PT eval this afternoon. He was admitted yesterday after experiencing multiple falls at home. Today, pt does not report any pain. He does present w generalized weakness, decreased activity tolerance, and overall decreased functional mobility. At baseline, he lives at home w his wife and uses a cane for ambulation at times. Currently pt able to sit EOB w supervision. He then stood w CGA and ambulated approx 30 ft w Rwx. He exhibits slow pace, but no overt LOB noted. He is limited by fatigue and weakness. Pt up in chair at end of session. Pt unsure of DC plans at this time, home w assist vs SNU pending progress. He will continue to benefit from skilled PT to maximize safety, strength, and independence w mobility.  -     Row Name 09/16/22 1346          Therapy Assessment/Plan (PT)    Rehab Potential (PT) good, to achieve stated therapy goals  -EJ     Criteria for Skilled Interventions Met (PT) yes  -EJ     Therapy Frequency (PT) 6 times/wk  -     Row Name 09/16/22 1346          Positioning and Restraints    Pre-Treatment Position in bed  -EJ     Post Treatment Position chair  -EJ     In Chair notified nsg;reclined;call light within reach;encouraged to call for assist;exit alarm on  -EJ           User Key  (r) = Recorded By, (t) = Taken By, (c) = Cosigned By    Initials Name Provider Type    Jessica Weinberg,  PT Physical Therapist               Outcome Measures     Row Name 09/16/22 1350          How much help from another person do you currently need...    Turning from your back to your side while in flat bed without using bedrails? 4  -EJ     Moving from lying on back to sitting on the side of a flat bed without bedrails? 4  -EJ     Moving to and from a bed to a chair (including a wheelchair)? 3  -EJ     Standing up from a chair using your arms (e.g., wheelchair, bedside chair)? 3  -EJ     Climbing 3-5 steps with a railing? 2  -EJ     To walk in hospital room? 3  -EJ     AM-PAC 6 Clicks Score (PT) 19  -EJ     Highest level of mobility 6 --> Walked 10 steps or more  -     Row Name 09/16/22 1350          Functional Assessment    Outcome Measure Options AM-PAC 6 Clicks Basic Mobility (PT)  -           User Key  (r) = Recorded By, (t) = Taken By, (c) = Cosigned By    Initials Name Provider Type     Jessica Chino, PT Physical Therapist                             Physical Therapy Education                 Title: PT OT SLP Therapies (In Progress)     Topic: Physical Therapy (In Progress)     Point: Mobility training (Done)     Learning Progress Summary           Patient Acceptance, E,TB,D, VU,NR by  at 9/16/2022 1351                   Point: Home exercise program (Not Started)     Learner Progress:  Not documented in this visit.          Point: Body mechanics (Not Started)     Learner Progress:  Not documented in this visit.          Point: Precautions (Not Started)     Learner Progress:  Not documented in this visit.                      User Key     Initials Effective Dates Name Provider Type Discipline     06/16/21 -  Jessica Chino, PT Physical Therapist PT              PT Recommendation and Plan  Planned Therapy Interventions (PT): balance training, bed mobility training, gait training, home exercise program, patient/family education, stretching, strengthening, stair training, ROM (range of motion),  transfer training  Plan of Care Reviewed With: patient  Progress: improving  Outcome Evaluation: Pt seen for PT eval this afternoon. He was admitted yesterday after experiencing multiple falls at home. Today, pt does not report any pain. He does present w generalized weakness, decreased activity tolerance, and overall decreased functional mobility. At baseline, he lives at home w his wife and uses a cane for ambulation at times. Currently pt able to sit EOB w supervision. He then stood w CGA and ambulated approx 30 ft w Rwx. He exhibits slow pace, but no overt LOB noted. He is limited by fatigue and weakness. Pt up in chair at end of session. Pt unsure of DC plans at this time, home w assist vs SNU pending progress. He will continue to benefit from skilled PT to maximize safety, strength, and independence w mobility.     Time Calculation:    PT Charges     Row Name 09/16/22 1351             Time Calculation    Start Time 1312  -EJ      Stop Time 1332  -EJ      Time Calculation (min) 20 min  -EJ      PT Received On 09/16/22  -EJ      PT - Next Appointment 09/17/22  -EJ      PT Goal Re-Cert Due Date 09/23/22  -EJ              Time Calculation- PT    Total Timed Code Minutes- PT 15 minute(s)  -EJ            User Key  (r) = Recorded By, (t) = Taken By, (c) = Cosigned By    Initials Name Provider Type    EJ Jessica Chino, PT Physical Therapist              Therapy Charges for Today     Code Description Service Date Service Provider Modifiers Qty    77575206866 HC PT EVAL MOD COMPLEXITY 3 9/16/2022 Jessica Chino, PT GP 1    27981546692 HC PT THER PROC EA 15 MIN 9/16/2022 Jessica Chino, PT GP 1          PT G-Codes  Outcome Measure Options: AM-PAC 6 Clicks Basic Mobility (PT)  AM-PAC 6 Clicks Score (PT): 19    Jessica Chino PT  9/16/2022

## 2022-09-16 NOTE — PLAN OF CARE
Goal Outcome Evaluation:      Ivf. Mri ordered and sheet faxed.walked with pt and sit in chair. Plans for placement at discharge. Safety maintained. Heike Bellamy

## 2022-09-16 NOTE — CASE MANAGEMENT/SOCIAL WORK
Discharge Planning Assessment  [facility]     Patient Name: Carlito Mo  MRN: [id number]  Today's Date: 9/16/2022    Admit Date: 9/15/2022     Discharge Needs Assessment     Row Name 09/16/22 0953       Living Environment    People in Home spouse;child(carlos), adult    Current Living Arrangements home    Potentially Unsafe Housing Conditions other (see comments)  no concerns    Primary Care Provided by self    Provides Primary Care For no one    Family Caregiver if Needed spouse    Quality of Family Relationships helpful;involved;supportive    Able to Return to Prior Arrangements yes       Resource/Environmental Concerns    Resource/Environmental Concerns none       Transition Planning    Patient/Family Anticipates Transition to home with family    Patient/Family Anticipated Services at Transition skilled nursing;home health care    Transportation Anticipated family or friend will provide       Discharge Needs Assessment    Readmission Within the Last 30 Days no previous admission in last 30 days    Current Outpatient/Agency/Support Group homecare agency;skilled nursing facility    Equipment Currently Used at Home cane, straight;grab bar;shower chair;walker, rolling    Concerns to be Addressed discharge planning    Anticipated Changes Related to Illness none    Equipment Needed After Discharge none    Outpatient/Agency/Support Group Needs homecare agency;skilled nursing facility    Discharge Facility/Level of Care Needs nursing facility, skilled;home with home health               Discharge Plan     Row Name 09/16/22 0959       Plan    Plan Pending SNF referrals vs. home with [facility]; Follow PT/OT evals    Plan Comments CCP met with patient at bedside. CCP role explained and discharge planning discussed. Face sheet verified and SIMMS noted. Patient’s PCP is Dr. Caballero. Patient lives with his wife and two adult sons. Patient has two steps to the entrance of the home. Patient lives in a tri-level home. Patient has grab  bars and shower chair present in his bathroom. Patient uses a cane but also has a walker. Patient has been to [facility] in the past. Patient placed outbound call to patient’s wife to discuss discharge plans. Patient and wife would like referral to [facility] (1st choice) and [facility] (2nd choice) if rehab is needed and would like [facility] if patient is able to return home. Referrals placed in [facility] and spoke with [facility]. CCP will follow for PT/OT Evals to determine discharge needs. Glenys GUERRIER              Continued Care and Services - Admitted Since 9/15/2022    Coordination has not been started for this encounter.          Demographic Summary     Row Name 09/16/22 0952       General Information    Admission Type observation    Arrived From emergency department    Required Notices Provided Observation Status Notice    Referral Source admission list    Reason for Consult discharge planning    Preferred Language English               Functional Status     Row Name 09/16/22 0952       Functional Status    Usual Activity Tolerance good    Current Activity Tolerance moderate       Functional Status, IADL    Medications assistive equipment    Meal Preparation assistive equipment    Housekeeping assistive equipment    Laundry assistive equipment    Shopping assistive equipment       Mental Status    General Appearance WDL WDL       Mental Status Summary    Recent Changes in Mental Status/Cognitive Functioning no changes               Psychosocial    No documentation.                Abuse/Neglect    No documentation.                Legal    No documentation.                Substance Abuse    No documentation.                Patient Forms    No documentation.                   FAREED Bullock

## 2022-09-17 ENCOUNTER — APPOINTMENT (OUTPATIENT)
Dept: MRI IMAGING | Facility: HOSPITAL | Age: 67
End: 2022-09-17

## 2022-09-17 PROBLEM — I63.9 ACUTE ISCHEMIC STROKE: Status: ACTIVE | Noted: 2022-09-17

## 2022-09-17 LAB
ANION GAP SERPL CALCULATED.3IONS-SCNC: 8.5 MMOL/L (ref 5–15)
BUN SERPL-MCNC: 60 MG/DL (ref 8–23)
BUN/CREAT SERPL: 20.3 (ref 7–25)
CALCIUM SPEC-SCNC: 8.5 MG/DL (ref 8.6–10.5)
CHLORIDE SERPL-SCNC: 105 MMOL/L (ref 98–107)
CHOLEST SERPL-MCNC: 165 MG/DL (ref 0–200)
CO2 SERPL-SCNC: 24.5 MMOL/L (ref 22–29)
CREAT SERPL-MCNC: 2.96 MG/DL (ref 0.76–1.27)
DEPRECATED RDW RBC AUTO: 42 FL (ref 37–54)
EGFRCR SERPLBLD CKD-EPI 2021: 22.4 ML/MIN/1.73
ERYTHROCYTE [DISTWIDTH] IN BLOOD BY AUTOMATED COUNT: 14.4 % (ref 12.3–15.4)
FOLATE SERPL-MCNC: 18.4 NG/ML (ref 4.78–24.2)
GLUCOSE BLDC GLUCOMTR-MCNC: 113 MG/DL (ref 70–130)
GLUCOSE BLDC GLUCOMTR-MCNC: 114 MG/DL (ref 70–130)
GLUCOSE BLDC GLUCOMTR-MCNC: 190 MG/DL (ref 70–130)
GLUCOSE BLDC GLUCOMTR-MCNC: 244 MG/DL (ref 70–130)
GLUCOSE SERPL-MCNC: 117 MG/DL (ref 65–99)
HBA1C MFR BLD: 8.1 % (ref 4.8–5.6)
HCT VFR BLD AUTO: 30.5 % (ref 37.5–51)
HDLC SERPL-MCNC: 42 MG/DL (ref 40–60)
HGB BLD-MCNC: 9.6 G/DL (ref 13–17.7)
LDLC SERPL CALC-MCNC: 105 MG/DL (ref 0–100)
LDLC/HDLC SERPL: 2.46 {RATIO}
MCH RBC QN AUTO: 25.8 PG (ref 26.6–33)
MCHC RBC AUTO-ENTMCNC: 31.5 G/DL (ref 31.5–35.7)
MCV RBC AUTO: 82 FL (ref 79–97)
PLATELET # BLD AUTO: 216 10*3/MM3 (ref 140–450)
PMV BLD AUTO: 10.8 FL (ref 6–12)
POTASSIUM SERPL-SCNC: 4.4 MMOL/L (ref 3.5–5.2)
RBC # BLD AUTO: 3.72 10*6/MM3 (ref 4.14–5.8)
SODIUM SERPL-SCNC: 138 MMOL/L (ref 136–145)
TRIGL SERPL-MCNC: 98 MG/DL (ref 0–150)
URATE SERPL-MCNC: 8.1 MG/DL (ref 3.4–7)
VIT B12 BLD-MCNC: 844 PG/ML (ref 211–946)
VLDLC SERPL-MCNC: 18 MG/DL (ref 5–40)
WBC NRBC COR # BLD: 5.59 10*3/MM3 (ref 3.4–10.8)

## 2022-09-17 PROCEDURE — 86335 IMMUNFIX E-PHORSIS/URINE/CSF: CPT | Performed by: PSYCHIATRY & NEUROLOGY

## 2022-09-17 PROCEDURE — 97166 OT EVAL MOD COMPLEX 45 MIN: CPT

## 2022-09-17 PROCEDURE — 82962 GLUCOSE BLOOD TEST: CPT

## 2022-09-17 PROCEDURE — 97110 THERAPEUTIC EXERCISES: CPT

## 2022-09-17 PROCEDURE — 63710000001 INSULIN LISPRO (HUMAN) PER 5 UNITS: Performed by: HOSPITALIST

## 2022-09-17 PROCEDURE — 80061 LIPID PANEL: CPT | Performed by: NURSE PRACTITIONER

## 2022-09-17 PROCEDURE — 70544 MR ANGIOGRAPHY HEAD W/O DYE: CPT

## 2022-09-17 PROCEDURE — 97530 THERAPEUTIC ACTIVITIES: CPT

## 2022-09-17 PROCEDURE — 84550 ASSAY OF BLOOD/URIC ACID: CPT | Performed by: NURSE PRACTITIONER

## 2022-09-17 PROCEDURE — 99233 SBSQ HOSP IP/OBS HIGH 50: CPT | Performed by: NURSE PRACTITIONER

## 2022-09-17 PROCEDURE — 82746 ASSAY OF FOLIC ACID SERUM: CPT | Performed by: NURSE PRACTITIONER

## 2022-09-17 PROCEDURE — 80048 BASIC METABOLIC PNL TOTAL CA: CPT | Performed by: NURSE PRACTITIONER

## 2022-09-17 PROCEDURE — 82607 VITAMIN B-12: CPT | Performed by: NURSE PRACTITIONER

## 2022-09-17 PROCEDURE — 63710000001 INSULIN LISPRO (HUMAN) PER 5 UNITS: Performed by: NURSE PRACTITIONER

## 2022-09-17 PROCEDURE — 70551 MRI BRAIN STEM W/O DYE: CPT

## 2022-09-17 PROCEDURE — 83036 HEMOGLOBIN GLYCOSYLATED A1C: CPT | Performed by: NURSE PRACTITIONER

## 2022-09-17 PROCEDURE — 85027 COMPLETE CBC AUTOMATED: CPT | Performed by: NURSE PRACTITIONER

## 2022-09-17 RX ORDER — ATORVASTATIN CALCIUM 80 MG/1
80 TABLET, FILM COATED ORAL NIGHTLY
Status: DISCONTINUED | OUTPATIENT
Start: 2022-09-18 | End: 2022-09-19 | Stop reason: HOSPADM

## 2022-09-17 RX ORDER — SODIUM CHLORIDE 0.9 % (FLUSH) 0.9 %
10 SYRINGE (ML) INJECTION AS NEEDED
Status: DISCONTINUED | OUTPATIENT
Start: 2022-09-17 | End: 2022-09-19 | Stop reason: HOSPADM

## 2022-09-17 RX ORDER — SODIUM CHLORIDE 0.9 % (FLUSH) 0.9 %
10 SYRINGE (ML) INJECTION EVERY 12 HOURS SCHEDULED
Status: DISCONTINUED | OUTPATIENT
Start: 2022-09-17 | End: 2022-09-19 | Stop reason: HOSPADM

## 2022-09-17 RX ADMIN — ATORVASTATIN CALCIUM 20 MG: 20 TABLET, FILM COATED ORAL at 08:25

## 2022-09-17 RX ADMIN — INSULIN LISPRO 2 UNITS: 100 INJECTION, SOLUTION INTRAVENOUS; SUBCUTANEOUS at 18:16

## 2022-09-17 RX ADMIN — INSULIN LISPRO 3 UNITS: 100 INJECTION, SOLUTION INTRAVENOUS; SUBCUTANEOUS at 11:34

## 2022-09-17 RX ADMIN — INSULIN LISPRO 2 UNITS: 100 INJECTION, SOLUTION INTRAVENOUS; SUBCUTANEOUS at 11:35

## 2022-09-17 RX ADMIN — OXYCODONE HYDROCHLORIDE AND ACETAMINOPHEN 1000 MG: 500 TABLET ORAL at 08:25

## 2022-09-17 RX ADMIN — ASPIRIN 81 MG: 81 TABLET, COATED ORAL at 08:25

## 2022-09-17 RX ADMIN — BUPROPION HYDROCHLORIDE 300 MG: 300 TABLET, EXTENDED RELEASE ORAL at 08:25

## 2022-09-17 RX ADMIN — AMLODIPINE BESYLATE 5 MG: 5 TABLET ORAL at 08:25

## 2022-09-17 RX ADMIN — FAMOTIDINE 10 MG: 20 TABLET ORAL at 20:27

## 2022-09-17 RX ADMIN — LEVOTHYROXINE SODIUM 75 MCG: 0.07 TABLET ORAL at 08:25

## 2022-09-17 RX ADMIN — TAMSULOSIN HYDROCHLORIDE 0.4 MG: 0.4 CAPSULE ORAL at 08:25

## 2022-09-17 RX ADMIN — Medication 5000 UNITS: at 08:25

## 2022-09-17 RX ADMIN — APIXABAN 5 MG: 5 TABLET, FILM COATED ORAL at 18:16

## 2022-09-17 RX ADMIN — Medication 10 ML: at 20:27

## 2022-09-17 RX ADMIN — Medication 10 ML: at 08:27

## 2022-09-17 RX ADMIN — CARVEDILOL 3.12 MG: 3.12 TABLET, FILM COATED ORAL at 08:25

## 2022-09-17 RX ADMIN — FAMOTIDINE 10 MG: 20 TABLET ORAL at 08:25

## 2022-09-17 RX ADMIN — APIXABAN 2.5 MG: 2.5 TABLET, FILM COATED ORAL at 05:37

## 2022-09-17 RX ADMIN — CARVEDILOL 3.12 MG: 3.12 TABLET, FILM COATED ORAL at 18:16

## 2022-09-17 NOTE — PROGRESS NOTES
[facility] Progress Note      Patient Name: Carlito Mo  : 1955  MRN: [id number]  Primary Care Physician:  Florencia Caballero MD  Date of admission: 9/15/2022    Subjective     Interval History:   Follow-up for chronic kidney disease stage IV.  He had 600 mL of urine output charted yesterday 1.1 L urine output charted since midnight.  Blood pressure stable overnight.  Renal function is improving    Review of Systems:   As noted above    Objective     Vitals:   Temp:  [97.7 °F (36.5 °C)-98 °F (36.7 °C)] 97.7 °F (36.5 °C)  Heart Rate:  [56-65] 56  Resp:  [18-20] 18  BP: (135-167)/(66-80) 135/66    Intake/Output Summary (Last 24 hours) at 2022 1036  Last data filed at 2022 0800  Gross per 24 hour   Intake 900 ml   Output 1525 ml   Net -625 ml       Physical Exam:    General Appearance: NAD  HEENT: oral mucosa normal, nonicteric sclera  Neck: supple, no JVD  Lungs: CTA  Heart: RRR, normal S1 and S2  Abdomen: soft, nondistended  Extremities: no edema  Neuro: Awake alert and moving all extremities    Scheduled Meds:     amLODIPine, 5 mg, Oral, Q24H  apixaban, 2.5 mg, Oral, Q12H  vitamin C, 1,000 mg, Oral, Daily  aspirin, 81 mg, Oral, Daily  atorvastatin, 20 mg, Oral, Daily  buPROPion XL, 300 mg, Oral, Daily  carvedilol, 3.125 mg, Oral, BID With Meals  cholecalciferol, 5,000 Units, Oral, Daily  famotidine, 10 mg, Oral, BID  insulin lispro, 0-7 Units, Subcutaneous, TID AC  insulin lispro, 2 Units, Subcutaneous, TID With Meals  levothyroxine, 75 mcg, Oral, Daily  sodium chloride, 10 mL, Intravenous, Q12H  tamsulosin, 0.4 mg, Oral, Daily      IV Meds:        Results Reviewed:   I have personally reviewed the results from the time of this admission to 2022 10:36 EDT     Results from last 7 days   Lab Units 22  0612 22  0434 09/15/22  2207   SODIUM mmol/L 138 138 141   POTASSIUM mmol/L 4.4 3.8 4.1   CHLORIDE mmol/L 105 102 101   CO2 mmol/L 24.5 25.9 24.3   BUN  mg/dL 60* 73* 75*   CREATININE mg/dL 2.96* 4.19* 4.50*   CALCIUM mg/dL 8.5* 8.8 9.5   BILIRUBIN mg/dL  --   --  0.3   ALK PHOS U/L  --   --  76   ALT (SGPT) U/L  --   --  30   AST (SGOT) U/L  --   --  26   GLUCOSE mg/dL 117* 228* 103*     Estimated Creatinine Clearance: 25.6 mL/min (A) (by C-G formula based on SCr of 2.96 mg/dL (H)).      Results from last 7 days   Lab Units 09/17/22  0612   URIC ACID mg/dL 8.1*     Results from last 7 days   Lab Units 09/17/22  0612 09/16/22  0434 09/15/22  2207   WBC 10*3/mm3 5.59 6.35 7.82   HEMOGLOBIN g/dL 9.6* 10.1* 11.2*   PLATELETS 10*3/mm3 216 233 242           Assessment / Plan     ASSESSMENT:  1. CKD stage 4 - Cr is up but slightly better today. Recent baseline creatinine is 3.5-3.9. He has progressive diabetic nephropathy, he might need dialysis initiation at relatively higher GFR in the future if he develops recurrent volume overload  2. Mild hypovolemia  3. HTN - BP adequate, amlodipine and carvedilol can be restarted   4. DM2 on insulin   5. Diastolic CHF on echo 2/3/22, EF 45 to 50%  6. Anemia of CKD, hemoglobin is currently acceptable at 10.1  7. BPH     PLAN:  -Renal function is improving and creatinine is back to baseline.  Continue to monitor.  -DC IV fluids  -Continue to hold torsemide        Ashley Bowles MD  09/17/22  10:36 EDT    [facility]  [phone]

## 2022-09-17 NOTE — PLAN OF CARE
Goal Outcome Evaluation:     Orders received for Communication Evaluation - per RNKlever, patient is WNL for communication/cognition at this time.  ST to sign off, please, reconsult, if needed.

## 2022-09-17 NOTE — PLAN OF CARE
Goal Outcome Evaluation:           Progress: improving   VSS.  A-Ox4.  Pt went down for MRI and MRA today.  NIH 0.  Will CTM.

## 2022-09-17 NOTE — THERAPY EVALUATION
Patient Name: Carlito Mo  : 1955    MRN: [id number]                              Today's Date: 2022       Admit Date: 9/15/2022    Visit Dx:     ICD-10-CM ICD-9-CM   1. Multiple falls  R29.6 V15.88   2. Fall in home, initial encounter  W19.XXXA E888.9    Y92.009 E849.0   3. Injury of head, initial encounter  S09.90XA 959.01   4. Chronic anticoagulation  Z79.01 V58.61   5. Acute renal failure superimposed on chronic kidney disease, unspecified CKD stage, unspecified acute renal failure type ([facility])  N17.9 584.9    N18.9 585.9   6. Volume depletion  E86.9 276.50   7. Orthostatic hypotension  I95.1 458.0   8. Chronic troponin elevation  R77.8 790.6     Patient Active Problem List   Diagnosis   • Major depressive disorder with single episode, in partial remission ([facility])   • Other hyperlipidemia   • Type 2 diabetes mellitus with stage 5 chronic kidney disease not on chronic dialysis, with long-term current use of insulin ([facility])   • Vitamin D deficiency   • Erectile dysfunction   • Chronic fatigue   • Type 2 diabetes mellitus with ophthalmic complication ([facility])   • Skin lesion of chest wall   • Slow transit constipation   • Benign prostatic hyperplasia with nocturia   • History of basal cell carcinoma   • High blood pressure associated with diabetes ([facility])   • Acquired hypothyroidism   • Hypertensive urgency   • Recurrent strokes ([facility])   • Noncompliance w/medication treatment due to intermit use of medication   • Urinary retention   • Pneumonia   • Acute on chronic combined systolic and diastolic congestive heart failure ([facility])   • Acute respiratory failure with hypoxia ([facility])   • Suspected sleep apnea   • Pleural effusion   • YAW (obstructive sleep apnea)   • Coronary artery disease involving native coronary artery of native heart without angina pectoris   • Chronically elevated troponin   • Colon cancer screening   • Enlarged lymph nodes   • Functional gait abnormality   • History of myocardial infarction   •  Hospital discharge follow-up   • Insomnia   • Iron deficiency anemia   • Major depression, recurrent (HCC)   • Anemia, chronic renal failure, stage 3 (moderate) (HCC)   • Essential hypertension   • Adverse effect of iron and its compounds, initial encounter   • Acute on chronic renal insufficiency   • Debility   • Falls frequently   • Acute pain of right shoulder   • Acute on chronic anemia   • Heme positive stool   • Neurogenic orthostatic hypotension (HCC)   • Symptomatic anemia   • History of colon polyps   • Helicobacter pylori gastritis   • Esophagitis   • Sleep related hypoxia   • Embolic stroke (HCC)   • Patent foramen ovale   • Pleural effusion, bilateral   • Cardiomyopathy (HCC)   • Lower abdominal pain   • Diarrhea   • CKD (chronic kidney disease) stage 4, GFR 15-29 ml/min (HCC)   • Hypertension not at goal   • Memory loss due to medical condition   • Generalized weakness   • ERRONEOUS ENCOUNTER--DISREGARD   • Weakness   • Paroxysmal atrial fibrillation (HCC)   • Chronic anticoagulation   • Multiple falls   • Dehydration   • SYLVAIN (acute kidney injury) (HCC)   • Acute ischemic stroke (HCC)     Past Medical History:   Diagnosis Date   • Acquired hypothyroidism    • Acute congestive heart failure (HCC)    • Acute respiratory failure with hypoxia (HCC)    • Acute sinusitis    • SYLVAIN (acute kidney injury) (HCC)     on CKD   • Anemia    • Arthritis    • CAD (coronary artery disease)    • Cancer (HCC)     skin   • Chronic combined systolic and diastolic congestive heart failure (HCC)    • Chronic ischemic heart disease    • Chronic kidney disease (CKD)    • CKD (chronic kidney disease)    • COPD (chronic obstructive pulmonary disease) (HCC)    • Depression    • Diabetes mellitus type 2, uncontrolled, without complications    • Diabetic neuropathy (HCC)    • Disease of thyroid gland    • Elevated cholesterol    • Fatigue    • Frequent PVCs    • Generalized weakness    • GERD (gastroesophageal reflux disease)    •  Heart attack (HCC)    • History of coronary artery disease     with remote history of bypass surgery in 2001   • Hyperlipidemia    • Hypertension    • Hypertensive encephalopathy    • Mental status change     Acute   • NO DEVICE/RECALLED    • Pleural effusion    • Pneumonia    • Poor historian    • RBBB (right bundle branch block)    • Stroke (HCC)     POOR VISION   • TIA (transient ischemic attack)    • Type 2 diabetes mellitus (HCC)    • Urinary retention    • Vitamin D deficiency      Past Surgical History:   Procedure Laterality Date   • APPENDECTOMY N/A 02/14/2016    Dr. Alexey Dodson   • ARTERIOVENOUS FISTULA/SHUNT SURGERY Right 6/3/2022    Procedure: RIGHT FOREARM ARTERIOVENOUS FISTULA PLACEMENT RADIOCEPHALIC WITH CEPHALIC VEIN TRANSPOSITION;  Surgeon: Eliseo Willis MD;  Location: [facility] MAIN OR;  Service: Vascular;  Laterality: Right;   • COLONOSCOPY      over 20 years ago.  no polyps    • COLONOSCOPY N/A 9/18/2018    Procedure: COLONOSCOPY;  Surgeon: Jose Enrique Louie MD;  Location: [facility] ENDOSCOPY;  Service: Gastroenterology   • COLONOSCOPY N/A 9/19/2018    IH, EH, polyps (TAs w/low grade dysplasia)   • COLONOSCOPY N/A 6/1/2020    Procedure: COLONOSCOPY;  Surgeon: Jose Enrique Louie MD;  Location: [facility] ENDOSCOPY;  Service: Gastroenterology;  Laterality: N/A;  Pre op-Anemia, Melena, History of Polyps  Post op-To Cecum/TI, Polyp, Poor Prep   • CORONARY ARTERY BYPASS GRAFT  11/2001   • ENDOSCOPY N/A 5/29/2020    Procedure: ESOPHAGOGASTRODUODENOSCOPY with biopsies;  Surgeon: Amanda Lovelace MD;  Location: [facility] ENDOSCOPY;  Service: Gastroenterology;  Laterality: N/A;  pre-anemia, dark stools  post-esophagitis, hiatal hernia   • ENDOSCOPY N/A 9/15/2020    Procedure: ESOPHAGOGASTRODUODENOSCOPY WITH BIOPSIES;  Surgeon: Jose Enrique Louie MD;  Location: [facility] ENDOSCOPY;  Service: Gastroenterology;  Laterality: N/A;  pre: history of melena and esophagitis  post: mild esophagitis and gastritis,  small hiatal hernia   • HERNIA REPAIR Left 12/05/2019   • TONSILLECTOMY     • VASECTOMY        General Information     [facility] Name 09/17/22 1451          OT Time and Intention    Document Type evaluation;therapy note (daily note)  -ANAHI     Mode of Treatment individual therapy;occupational therapy  -[facility] Name 09/17/22 1451          General Information    Patient Profile Reviewed yes  -LE     Prior Level of Function independent:;min assist:;transfer;ADL's  cane used in trilevel home. h/o CVA with cognitive deficits (remember passwords, follow sheet musics)  -ANAHI     Existing Precautions/Restrictions fall  -[facility] Name 09/17/22 1451          Living Environment    People in Home child(carlos), adult;spouse  -[facility] Name 09/17/22 1451          Cognition    Orientation Status (Cognition) oriented to;person;place;time;situation  flat affect, delayed processing  -[facility] Name 09/17/22 1451          Safety Issues, Functional Mobility    Comment, Safety Issues/Impairments (Mobility) no skid socks and gait belt.  -ANAHI           User Key  (r) = Recorded By, (t) = Taken By, (c) = Cosigned By    Initials Name Provider Type    Alisia Nowak OTR Occupational Therapist                 Mobility/ADL's     Row Name 09/17/22 1453          Bed Mobility    Bed Mobility supine-sit  -LE     Supine-Sit [place] (Bed Mobility) standby assist  -ANAHI     Assistive Device (Bed Mobility) head of bed elevated;bed rails  -[facility] Name 09/17/22 1453          Transfers    Transfers sit-stand transfer;stand-sit transfer;other (see comments)  -ANAHI     Comment, (Transfers) bed to stretcher with transport  -ANAHI     Sit-Stand [place] (Transfers) contact guard;verbal cues  -ANAHI     Stand-Sit [place] (Transfers) contact guard  -     Row Name 09/17/22 1453          Sit-Stand Transfer    Assistive Device (Sit-Stand Transfers) walker, front-wheeled  -[facility] Name 09/17/22 1453          Stand-Sit Transfer    Assistive Device  (Stand-Sit Transfers) walker, front-wheeled  -LE     Row Name 09/17/22 1453          Functional Mobility    Functional Mobility- Ind. Level contact guard assist;verbal cues required  -LE     Functional Mobility- Device walker, front-wheeled  -LE     Functional Mobility-Distance (Feet) --  bed to hallway for stretcher.  -LE     Functional Mobility- Comment guide assist with walker.  -     Row Name 09/17/22 1453          Activities of Daily Living    BADL Assessment/Intervention feeding;toileting  -[facility] Name 09/17/22 1453          Self-Feeding Assessment/Training    Comment, (Feeding) denies assist to self feed  -     Row Name 09/17/22 1453          Toileting Assessment/Training    Comment, (Toileting) OT tab brief around hips while pt stands at walker as brief falls down when pt stands  -LE           User Key  (r) = Recorded By, (t) = Taken By, (c) = Cosigned By    Initials Name Provider Type    Alisia Nowak OTR Occupational Therapist               Obj/Interventions     [facility] Name 09/17/22 1454          Sensory Assessment (Somatosensory)    Sensory Assessment (Somatosensory) UE sensation intact  -[facility] Name 09/17/22 1454          Vision Assessment/Intervention    Vision Assessment Comment blurred vision, now worsened  -[facility] Name 09/17/22 1454          Range of Motion Comprehensive    General Range of Motion bilateral upper extremity ROM WFL  -[facility] Name 09/17/22 1454          Strength Comprehensive (MMT)    Comment, General Manual Muscle Testing (MMT) Assessment B UE 4/5.  -[facility] Name 09/17/22 1454          Balance    Static Sitting Balance standby assist  -LE     Static Standing Balance standby assist  -LE     Dynamic Standing Balance contact guard  -LE     Position/Device Used, Standing Balance walker, rolling  -LE     Comment, Balance cues for posture, keep walker close.  -LE           User Key  (r) = Recorded By, (t) = Taken By, (c) = Cosigned By    Initials Name Provider Type     Alisia Nowak, OTR Occupational Therapist               Goals/Plan     Row Name 09/17/22 1508          Transfer Goal 1 (OT)    Activity/Assistive Device (Transfer Goal 1, OT) sit-to-stand/stand-to-sit;bed-to-chair/chair-to-bed;toilet;walker, rolling  -LE     [place] Level/Cues Needed (Transfer Goal 1, OT) standby assist  -LE     Time Frame (Transfer Goal 1, OT) 2 weeks  -LE     Progress/Outcome (Transfer Goal 1, OT) goal ongoing  -     Row Name 09/17/22 1508          Bathing Goal 1 (OT)    Activity/Device (Bathing Goal 1, OT) bathing skills, all  -LE     [place] Level/Cues Needed (Bathing Goal 1, OT) set-up required;standby assist;verbal cues required  -LE     Time Frame (Bathing Goal 1, OT) 2 weeks  -LE     Progress/Outcomes (Bathing Goal 1, OT) goal ongoing  -[facility] Name 09/17/22 1508          Dressing Goal 1 (OT)    Activity/Device (Dressing Goal 1, OT) dressing skills, all  -LE     [place]/Cues Needed (Dressing Goal 1, OT) standby assist;set-up required;verbal cues required  -LE     Time Frame (Dressing Goal 1, OT) 2 weeks  -LE     Progress/Outcome (Dressing Goal 1, OT) goal ongoing  -[facility] Name 09/17/22 1508          Toileting Goal 1 (OT)    Activity/Device (Toileting Goal 1, OT) toileting skills, all  -LE     [place] Level/Cues Needed (Toileting Goal 1, OT) modified independence  -LE     Time Frame (Toileting Goal 1, OT) 2 weeks  -LE     Progress/Outcome (Toileting Goal 1, OT) goal ongoing  -[facility] Name 09/17/22 1508          Therapy Assessment/Plan (OT)    Planned Therapy Interventions (OT) activity tolerance training;adaptive equipment training;BADL retraining;ROM/therapeutic exercise;functional balance retraining;occupation/activity based interventions;transfer/mobility retraining;patient/caregiver education/training;neuromuscular control/coordination retraining  -LE           User Key  (r) = Recorded By, (t) = Taken By, (c) = Cosigned By    Initials Name Provider Type  "   Alisia Nowak, OTR Occupational Therapist               Clinical Impression     Row Name 09/17/22 1450          Pain Assessment    Pain Location - Side/Orientation Left  -LE     Pain Location - toe  -LE     Pre/Posttreatment Pain Comment :little pain\",  -LE     Pain Intervention(s) Rest;Repositioned  -LE     Row Name 09/17/22 1455          Plan of Care Review    Plan of Care Reviewed With patient  -LE     Outcome Evaluation Pt admit  from home with falls. History includes CVA with ? cognitive delays.  Pt reports (+) for new CVA. Pt seen by OT and is alert, oriented but delay in processing with answering questions which pt reports is new.  B UE functional.  Pt moves to EOB and stands with CGA.  Transport present and pt walks to stretcher after assist to tighten brief. Overall mobility at walker with CGA but guide assist and flexed posture.  Will cont to follow for skilled OT to increase activity tolerance, safety and independence.  -ANAHI     Row Name 09/17/22 1454          Therapy Assessment/Plan (OT)    Patient/Family Therapy Goal Statement (OT) be able to care for self  -LE     Rehab Potential (OT) good, to achieve stated therapy goals  -LE     Criteria for Skilled Therapeutic Interventions Met (OT) meets criteria;yes  -LE     Therapy Frequency (OT) 5 times/wk  -ANAHI     Row Name 09/17/22 1452          Therapy Plan Review/Discharge Plan (OT)    Equipment Needs Upon Discharge (OT) --  cane, walker, shower chair, grab bars,  -LE     Row Name 09/17/22 1454          Vital Signs    O2 Delivery Pre Treatment room air  -LE     O2 Delivery Intra Treatment room air  -LE     Pre Patient Position Supine  -LE     Intra Patient Position Standing  -LE     Post Patient Position Supine  -LE     Row Name 09/17/22 145          Positioning and Restraints    Pre-Treatment Position in bed  -LE     Post Treatment Position other  -LE     Other Position with other staff  with transport on stretcher  -LE           User Key  (r) = " Recorded By, (t) = Taken By, (c) = Cosigned By    Initials Name Provider Type    Alisia Nowak OTR Occupational Therapist               Outcome Measures     Row Name 09/17/22 1509          How much help from another is currently needed...    Putting on and taking off regular lower body clothing? 3  -LE     Bathing (including washing, rinsing, and drying) 3  -LE     Toileting (which includes using toilet bed pan or urinal) 2  -LE     Putting on and taking off regular upper body clothing 3  -LE     Taking care of personal grooming (such as brushing teeth) 3  -LE     Eating meals 3  -LE     AM-PAC 6 Clicks Score (OT) 17  -LE     Row Name 09/17/22 1200          How much help from another person do you currently need...    Turning from your back to your side while in flat bed without using bedrails? 4  -CH     Moving from lying on back to sitting on the side of a flat bed without bedrails? 4  -CH     Moving to and from a bed to a chair (including a wheelchair)? 3  -CH     Standing up from a chair using your arms (e.g., wheelchair, bedside chair)? 3  -CH     Climbing 3-5 steps with a railing? 2  -CH     To walk in hospital room? 3  -CH     AM-PAC 6 Clicks Score (PT) 19  -CH     Highest level of mobility 6 --> Walked 10 steps or more  -     Row Name 09/17/22 1510          Modified [place] Scale    Modified [place] Scale 4 - Moderately severe disability.  Unable to walk without assistance, and unable to attend to own bodily needs without assistance.  -     Row Name 09/17/22 1509          Functional Assessment    Outcome Measure Options AM-PAC 6 Clicks Daily Activity (OT);Modified [place]  -LE           User Key  (r) = Recorded By, (t) = Taken By, (c) = Cosigned By    Initials Name Provider Type    Alisia Nowak OTR Occupational Therapist    CH Yoni Valencia, PT Physical Therapist                Occupational Therapy Education                 Title: PT OT SLP Therapies (In Progress)     Topic: Occupational Therapy  (In Progress)     Point: ADL training (Done)     Description:   Instruct learner(s) on proper safety adaptation and remediation techniques during self care or transfers.   Instruct in proper use of assistive devices.              Learning Progress Summary           Patient Acceptance, E, VU,Bed IU by ANAHI at 9/17/2022 1510    Comment: role of OT,plan of care .                   Point: Home exercise program (Done)     Description:   Instruct learner(s) on appropriate technique for monitoring, assisting and/or progressing therapeutic exercises/activities.              Learning Progress Summary           Patient Acceptance, E, VU,Bed IU by ANAHI at 9/17/2022 1510    Comment: role of OT,plan of care .                   Point: Precautions (Not Started)     Description:   Instruct learner(s) on prescribed precautions during self-care and functional transfers.              Learner Progress:  Not documented in this visit.          Point: Body mechanics (Done)     Description:   Instruct learner(s) on proper positioning and spine alignment during self-care, functional mobility activities and/or exercises.              Learning Progress Summary           Patient Acceptance, E, VU,Bed IU by ANAHI at 9/17/2022 1510    Comment: role of OT,plan of care .                               User Key     Initials Effective Dates Name Provider Type Discipline     06/16/21 -  Alisia Barlow, OTR Occupational Therapist OT              OT Recommendation and Plan  Planned Therapy Interventions (OT): activity tolerance training, adaptive equipment training, BADL retraining, ROM/therapeutic exercise, functional balance retraining, occupation/activity based interventions, transfer/mobility retraining, patient/caregiver education/training, neuromuscular control/coordination retraining  Therapy Frequency (OT): 5 times/wk  Plan of Care Review  Plan of Care Reviewed With: patient  Outcome Evaluation: Pt admit  from home with falls. History includes CVA with  ? cognitive delays.  Pt reports (+) for new CVA. Pt seen by OT and is alert, oriented but delay in processing with answering questions which pt reports is new.  B UE functional.  Pt moves to EOB and stands with CGA.  Transport present and pt walks to stretcher after assist to tighten brief. Overall mobility at walker with CGA but guide assist and flexed posture.  Will cont to follow for skilled OT to increase activity tolerance, safety and independence.     Time Calculation:    Time Calculation- OT     Row Name 09/17/22 1510             Time Calculation- OT    OT Start Time 1448  -LE      OT Stop Time 1505  -LE      OT Time Calculation (min) 17 min  -LE      Total Timed Code Minutes- OT 8 minute(s)  -LE      OT Received On 09/17/22  -LE      OT - Next Appointment 09/19/22  -LE      OT Goal Re-Cert Due Date 10/01/22  -LE              Timed Charges    46284 - OT Therapeutic Activity Minutes 8  -LE              Untimed Charges    OT Eval/Re-eval Minutes 9  -LE              Total Minutes    Timed Charges Total Minutes 8  -LE      Untimed Charges Total Minutes 9  -LE       Total Minutes 17  -LE            User Key  (r) = Recorded By, (t) = Taken By, (c) = Cosigned By    Initials Name Provider Type    Alisia Nowak OTMICAH Occupational Therapist              Therapy Charges for Today     Code Description Service Date Service Provider Modifiers Qty    75245556674 HC OT EVAL MOD COMPLEXITY 2 9/17/2022 Alisia Barlow OTR GO 1    [id number]  OT THERAPEUTIC ACT EA 15 MIN 9/17/2022 Alisia Barlow OTR GO 1               FADI Pepe  9/17/2022

## 2022-09-17 NOTE — PROGRESS NOTES
[facility] Acute Inpt [facility]    Referral received via stroke order set.  Please note this is screening only.  Rehab admissions will not actively be evaluating this pt.  If felt pt is appropriate for our services once therapies start, please call our office at [phone] to initiate full referral.     Zahida Brady RN  Acute Rehab Admission Nurse

## 2022-09-17 NOTE — PROGRESS NOTES
DOS: 2022  NAME: Carlito Mo   : 1955  PCP: Florencia Caballero MD    Chief Complaint   Patient presents with   • Fall   • Weakness - Generalized        Stroke    Subjective: Pt seen in follow up, however the problem is new to me.  Patient states he feels okay today now that he has been able to eat.  Denies any new weakness, numbness, speech or visual disturbances, or headaches. No family at bedside.    Objective:  Vital signs:      Vitals:    22 1844 22 1900 22 2241 22 0700   BP:  153/70 167/80 135/66   BP Location:   Right arm Left arm   Patient Position:   Lying Lying   Pulse: 65 64 58 56   Resp: 18 18 18 18   Temp: 98 °F (36.7 °C) 97.8 °F (36.6 °C) 97.9 °F (36.6 °C) 97.7 °F (36.5 °C)   TempSrc: Oral Oral Oral Oral   SpO2: 99% 100% 97% 98%   Weight:       Height:           Current Facility-Administered Medications:   •  acetaminophen (TYLENOL) tablet 650 mg, 650 mg, Oral, Q4H PRN **OR** acetaminophen (TYLENOL) 160 MG/5ML solution 650 mg, 650 mg, Oral, Q4H PRN **OR** acetaminophen (TYLENOL) suppository 650 mg, 650 mg, Rectal, Q4H PRN, Maya Archibald, APRN  •  amLODIPine (NORVASC) tablet 5 mg, 5 mg, Oral, Q24H, Ilir Briones MD, 5 mg at 22 0825  •  apixaban (ELIQUIS) tablet 2.5 mg, 2.5 mg, Oral, Q12H, Delmar Tse MD, 2.5 mg at 22 0537  •  ascorbic acid (VITAMIN C) tablet 1,000 mg, 1,000 mg, Oral, Daily, Delmar Tse MD, 1,000 mg at 22 0825  •  aspirin EC tablet 81 mg, 81 mg, Oral, Daily, Delmar Tse MD, 81 mg at 22  •  atorvastatin (LIPITOR) tablet 20 mg, 20 mg, Oral, Daily, Delmar Tse MD, 20 mg at 22  •  buPROPion XL (WELLBUTRIN XL) 24 hr tablet 300 mg, 300 mg, Oral, Daily, Delmar Tse MD, 300 mg at 22  •  carvedilol (COREG) tablet 3.125 mg, 3.125 mg, Oral, BID With Meals, Ilir Briones MD, 3.125 mg at 22  •  cholecalciferol (VITAMIN D3) tablet 5,000 Units, 5,000  Units, Oral, Daily, Delmar Tse MD, 5,000 Units at 09/17/22 0825  •  dextrose (D50W) (25 g/50 mL) IV injection 25 g, 25 g, Intravenous, Q15 Min PRN, Maya Archibald APRN  •  dextrose (GLUTOSE) oral gel 15 g, 15 g, Oral, Q15 Min PRN, Maya Archibald APRN  •  famotidine (PEPCID) tablet 10 mg, 10 mg, Oral, BID, Delmar Tse MD, 10 mg at 09/17/22 0825  •  glucagon (human recombinant) (GLUCAGEN DIAGNOSTIC) injection 1 mg, 1 mg, Intramuscular, Q15 Min PRN, Maya Archibald APRN  •  insulin lispro (ADMELOG) injection 0-7 Units, 0-7 Units, Subcutaneous, TID AC, Maya Archibald APRN, 3 Units at 09/17/22 1134  •  insulin lispro (ADMELOG) injection 2 Units, 2 Units, Subcutaneous, TID With Meals, Delmar Tse MD, 2 Units at 09/17/22 1135  •  levothyroxine (SYNTHROID, LEVOTHROID) tablet 75 mcg, 75 mcg, Oral, Daily, Delmar Tse MD, 75 mcg at 09/17/22 0825  •  ondansetron (ZOFRAN) injection 4 mg, 4 mg, Intravenous, Q6H PRN, Maya Archibald APRN  •  [COMPLETED] Insert peripheral IV, , , Once **AND** sodium chloride 0.9 % flush 10 mL, 10 mL, Intravenous, PRN, Gregory Marie MD  •  sodium chloride 0.9 % flush 10 mL, 10 mL, Intravenous, Q12H, Maya Archibald APRN, 10 mL at 09/17/22 0827  •  sodium chloride 0.9 % flush 10 mL, 10 mL, Intravenous, PRN, Maya Archibald APRN  •  tamsulosin (FLOMAX) 24 hr capsule 0.4 mg, 0.4 mg, Oral, Daily, Ilir Briones MD, 0.4 mg at 09/17/22 0825    PRN meds  •  acetaminophen **OR** acetaminophen **OR** acetaminophen  •  dextrose  •  dextrose  •  glucagon (human recombinant)  •  ondansetron  •  [COMPLETED] Insert peripheral IV **AND** sodium chloride  •  sodium chloride    No current facility-administered medications on file prior to encounter.     Current Outpatient Medications on File Prior to Encounter   Medication Sig   • amLODIPine (NORVASC) 10 MG tablet Take 10 mg by mouth Daily.   • aspirin 81 MG EC tablet Take 81 mg by mouth Daily.  INSTR TO FOLLOW MD GUIDELINE ON WHEN/IF TO HOLD DAY OF SURGERY   • atorvastatin (LIPITOR) 20 MG tablet Take 20 mg by mouth every night at bedtime.   • buPROPion XL (WELLBUTRIN XL) 150 MG 24 hr tablet Take 300 mg by mouth Daily.   • carvedilol (COREG) 3.125 MG tablet Take 3.125 mg by mouth 2 (Two) Times a Day With Meals.   • Cholecalciferol (VITAMIN D3) 5000 units capsule capsule Take 5,000 Units by mouth Daily.   • Eliquis 2.5 MG tablet tablet TAKE 1 TABLET BY MOUTH EVERY TWELVE HOURS (Patient taking differently: Take 2.5 mg by mouth Daily.)   • famotidine (PEPCID) 10 MG tablet Take 10 mg by mouth 2 (Two) Times a Day.   • insulin glulisine (Apidra) 100 UNIT/ML injection Inject 2 Units under the skin into the appropriate area as directed 3 (Three) Times a Day Before Meals. Patient says he does not take consistently (Patient taking differently: Inject 2 Units under the skin into the appropriate area as directed 3 (Three) Times a Day Before Meals. HAS NOT STARTED TAKING YET, WAITING FOR SUPPLIES)   • levothyroxine (SYNTHROID, LEVOTHROID) 75 MCG tablet Take 1 tablet by mouth Daily.   • tamsulosin (FLOMAX) 0.4 MG capsule 24 hr capsule Take 1 capsule by mouth Every Night.   • torsemide (DEMADEX) 100 MG tablet Take 1 tablet by mouth Daily for 30 days. (Patient taking differently: Take 40 mg by mouth Daily.)   • vitamin C (ASCORBIC ACID) 250 MG tablet Take 1,000 mg by mouth Daily.   • Insulin Glargine (BASAGLAR KWIKPEN SC) Inject 4 Units under the skin into the appropriate area as directed Daily.   • Insulin Glargine (BASAGLAR KWIKPEN) 100 UNIT/ML injection pen Inject 4 Units under the skin into the appropriate area as directed Every Night. Patient says he does not take consistently (Patient taking differently: Inject 4 Units under the skin into the appropriate area as directed Every Night.)       General appearance: Chronically ill-appearing elderly male, NAD, alert and cooperative  HEENT: Normocephalic, atraumatic, no  masses or tenderness  Resp: Even and unlabored  Extremities: No obvious edema  Skin: warm, dry    Neurological:   MS: oriented x3, able to correctly identify wall clock time,  normal attention/concentration, language intact, no neglect, normal fund of knowledge  CN: visual acuity grossly abnormal, visual fields full, EOMI, facial sensation equal, no facial droop, tongue midline  Motor: 5/5 in all 4 ext., normal tone  Sensory: light touch sensation intact in all 4 ext.  Coordination: Normal finger to nose test  Gait and station: Deferred  Rapid alternating movements: normal finger to thumb tap    Laboratory results:  Lab Results   Component Value Date    TSH 1.610 09/16/2022     Lab Results   Component Value Date    HGBA1C 7.30 (H) 07/25/2022     Lab Results   Component Value Date    SVSCASDW01 844 09/17/2022     Lab Results   Component Value Date    CHOL 229 (H) 06/05/2022    CHOL 187 03/02/2022    CHOL 256 (H) 02/03/2022    CHLPL 159 11/20/2019    CHLPL 136 04/09/2019    CHLPL 159 12/18/2017     Lab Results   Component Value Date    TRIG 83 06/05/2022    TRIG 59 03/02/2022    TRIG 87 02/03/2022     Lab Results   Component Value Date    HDL 42 06/05/2022    HDL 47 03/02/2022    HDL 48 02/03/2022     Lab Results   Component Value Date     (H) 06/05/2022     (H) 03/02/2022     (H) 02/03/2022     Lab Results   Component Value Date    WBC 5.59 09/17/2022    HGB 9.6 (L) 09/17/2022    HCT 30.5 (L) 09/17/2022    MCV 82.0 09/17/2022     09/17/2022     Lab Results   Component Value Date    GLUCOSE 117 (H) 09/17/2022    BUN 60 (H) 09/17/2022    CREATININE 2.96 (H) 09/17/2022    EGFRIFNONA 19 (L) 02/04/2022    EGFRIFAFRI 74 12/12/2017    BCR 20.3 09/17/2022    K 4.4 09/17/2022    CO2 24.5 09/17/2022    CALCIUM 8.5 (L) 09/17/2022    PROTENTOTREF 6.7 12/12/2017    ALBUMIN 3.90 09/15/2022    LABIL2 1.5 11/20/2019    AST 26 09/15/2022    ALT 30 09/15/2022     Lab Results   Component Value Date    PTT  21.9 (L) 03/01/2022     Lab Results   Component Value Date    INR 1.61 (H) 07/24/2022    INR 1.13 (H) 06/01/2022    INR 1.07 03/01/2022    PROTIME 18.8 (H) 07/24/2022    PROTIME 14.4 (H) 06/01/2022    PROTIME 13.8 03/01/2022     Brief Urine Lab Results  (Last result in the past 365 days)      Color   Clarity   Blood   Leuk Est   Nitrite   Protein   CREAT   Urine HCG        09/15/22 2251 Yellow   Clear   Negative   Negative   Negative   100 mg/dL (2+)                 Review and interpretation of imaging:  CT Head Without Contrast    Result Date: 9/16/2022  No acute intracranial findings.  Radiation dose reduction techniques were utilized, including automated exposure control and exposure modulation based on body size.  This report was finalized on 9/16/2022 12:07 AM by Dr. Alice Allen M.D.      Results for orders placed during the hospital encounter of 02/01/22    Adult Transthoracic Echo Complete W/ Cont if Necessary Per Protocol    Interpretation Summary  · There is severe hypokinesis of the mid to distal anteroseptum  · Left ventricular ejection fraction appears to be 46 - 50%.  · Left ventricular wall thickness is consistent with mild concentric hypertrophy.  · Left ventricular diastolic function is consistent with (grade I) impaired relaxation.  · Normal right ventricular cavity size and systolic function noted.  · There is no evidence of pericardial effusion.        Impression/Assessment:  This is a 67-year-old male with past medical history of atrial fibrillation on Eliquis 2.5 mg twice daily, multiple strokes with residual blurred vision bilaterally, acquired hypothyroidism, CKD, CAD, COPD, diabetes, diabetic neuropathy, MI, hypertension, hyperlipidemia who presented to the hospital on 9/15/2022 with complaints of suffering recurrent falls at home and near syncopal episode.  He did hit his head but denied losing consciousness.  He states he has had generalized weakness and poor p.o. intake over the last  few days.    BP on arrival 138/72, HR 63.  EKG with NSR, RBBB. Temp 97.8.  .  Creatinine 4.50.  His initial noncontrast CT head was negative for acute findings.  MRI brain complete, final report pending.  Per my independent review it appears to show a right medial temporal lobe and left occipital lobe acute infarct with severe white matter disease.  Several old scattered bilateral hemispheric infarcts and chronic microhemorrhages also noted.  Reportedly had positive orthostatics with a BP of 91/66 when standing.    Diagnosis:  Acute right MCA and left PCA territory infarcts  Paroxysmal atrial fibrillation  Medical noncompliance  Hyperlipidemia  Type 2 diabetes  SYLVAIN on CKD  Orthostatic hypotension likely secondary to diabetic autonomic neuropathy  Sensory polyneuropathy likely related to diabetes  History of multiple cardioembolic infarcts    Additional work up to date:  Labs: Folate 18.40, hepatitis panel nonreactive, HIV antibodies negative, urine tox negative, ethanol levels negative    Plan:  MRI personally reviewed and reviewed with Dr. Corbin, appears to show 2 small acute infarcts within the right medial temporal and left occipital lobes.  After discussing with the patient, he reported that he often times misses several doses of his Eliquis.  Over the last week he missed at least 2 doses.  Based on his age and weight he meets the 5 mg twice daily dosing.  It appears his creatinine is actually lower than his baseline of 3.5-3.9 per nephrology today.  Would like to increase him to Eliquis 5 mg twice daily if okay with nephrology.  Will check 2D echo, MRA head, and carotid duplex.  We will also check an A1c and lipid panel.  For now increase Lipitor to 80 mg  If we are able to increase his Eliquis to 5 mg twice daily no need for ASA from our standpoint.  Neurochecks per stroke protocol  Okay to normalize BP  May need abdominal binder or thigh high compression stockings. Will defer to primary/cardiology to  address + orthostatics. Today he has been asymptomatic with mild ambulation.  Stroke Education  TREMAYNE/SCDs  PT/OT/ST  SPEP/IEP pending with neuropathy labs.  Therapies as written. CCP for discharge planning. Call RRT for any acute neurological changes. We will continue to follow and advise.    Case discussed with patient, DEMAR Ernst, and Dr. Corbin, and she agrees with plan above.     JI Ayala

## 2022-09-17 NOTE — THERAPY TREATMENT NOTE
Patient Name: Carlito Mo  : 1955    MRN: [id number]                              Today's Date: 2022       Admit Date: 9/15/2022    Visit Dx:     ICD-10-CM ICD-9-CM   1. Multiple falls  R29.6 V15.88   2. Fall in home, initial encounter  W19.XXXA E888.9    Y92.009 E849.0   3. Injury of head, initial encounter  S09.90XA 959.01   4. Chronic anticoagulation  Z79.01 V58.61   5. Acute renal failure superimposed on chronic kidney disease, unspecified CKD stage, unspecified acute renal failure type ([facility])  N17.9 584.9    N18.9 585.9   6. Volume depletion  E86.9 276.50   7. Orthostatic hypotension  I95.1 458.0   8. Chronic troponin elevation  R77.8 790.6     Patient Active Problem List   Diagnosis   • Major depressive disorder with single episode, in partial remission ([facility])   • Other hyperlipidemia   • Type 2 diabetes mellitus with stage 5 chronic kidney disease not on chronic dialysis, with long-term current use of insulin ([facility])   • Vitamin D deficiency   • Erectile dysfunction   • Chronic fatigue   • Type 2 diabetes mellitus with ophthalmic complication ([facility])   • Skin lesion of chest wall   • Slow transit constipation   • Benign prostatic hyperplasia with nocturia   • History of basal cell carcinoma   • High blood pressure associated with diabetes ([facility])   • Acquired hypothyroidism   • Hypertensive urgency   • Recurrent strokes ([facility])   • Noncompliance w/medication treatment due to intermit use of medication   • Urinary retention   • Pneumonia   • Acute on chronic combined systolic and diastolic congestive heart failure ([facility])   • Acute respiratory failure with hypoxia ([facility])   • Suspected sleep apnea   • Pleural effusion   • YAW (obstructive sleep apnea)   • Coronary artery disease involving native coronary artery of native heart without angina pectoris   • Chronically elevated troponin   • Colon cancer screening   • Enlarged lymph nodes   • Functional gait abnormality   • History of myocardial infarction   •  Hospital discharge follow-up   • Insomnia   • Iron deficiency anemia   • Major depression, recurrent (HCC)   • Anemia, chronic renal failure, stage 3 (moderate) (HCC)   • Essential hypertension   • Adverse effect of iron and its compounds, initial encounter   • Acute on chronic renal insufficiency   • Debility   • Falls frequently   • Acute pain of right shoulder   • Acute on chronic anemia   • Heme positive stool   • Neurogenic orthostatic hypotension (HCC)   • Symptomatic anemia   • History of colon polyps   • Helicobacter pylori gastritis   • Esophagitis   • Sleep related hypoxia   • Embolic stroke (HCC)   • Patent foramen ovale   • Pleural effusion, bilateral   • Cardiomyopathy (HCC)   • Lower abdominal pain   • Diarrhea   • CKD (chronic kidney disease) stage 4, GFR 15-29 ml/min (HCC)   • Hypertension not at goal   • Memory loss due to medical condition   • Generalized weakness   • ERRONEOUS ENCOUNTER--DISREGARD   • Weakness   • Paroxysmal atrial fibrillation (HCC)   • Chronic anticoagulation   • Multiple falls   • Dehydration   • SYLVAIN (acute kidney injury) (HCC)     Past Medical History:   Diagnosis Date   • Acquired hypothyroidism    • Acute congestive heart failure (HCC)    • Acute respiratory failure with hypoxia (HCC)    • Acute sinusitis    • SYLVAIN (acute kidney injury) (HCC)     on CKD   • Anemia    • Arthritis    • CAD (coronary artery disease)    • Cancer (HCC)     skin   • Chronic combined systolic and diastolic congestive heart failure (HCC)    • Chronic ischemic heart disease    • Chronic kidney disease (CKD)    • CKD (chronic kidney disease)    • COPD (chronic obstructive pulmonary disease) (HCC)    • Depression    • Diabetes mellitus type 2, uncontrolled, without complications    • Diabetic neuropathy (HCC)    • Disease of thyroid gland    • Elevated cholesterol    • Fatigue    • Frequent PVCs    • Generalized weakness    • GERD (gastroesophageal reflux disease)    • Heart attack (HCC)    • History of  coronary artery disease     with remote history of bypass surgery in 2001   • Hyperlipidemia    • Hypertension    • Hypertensive encephalopathy    • Mental status change     Acute   • NO DEVICE/RECALLED    • Pleural effusion    • Pneumonia    • Poor historian    • RBBB (right bundle branch block)    • Stroke (HCC)     POOR VISION   • TIA (transient ischemic attack)    • Type 2 diabetes mellitus (HCC)    • Urinary retention    • Vitamin D deficiency      Past Surgical History:   Procedure Laterality Date   • APPENDECTOMY N/A 02/14/2016    Dr. Aleexy Dodson   • ARTERIOVENOUS FISTULA/SHUNT SURGERY Right 6/3/2022    Procedure: RIGHT FOREARM ARTERIOVENOUS FISTULA PLACEMENT RADIOCEPHALIC WITH CEPHALIC VEIN TRANSPOSITION;  Surgeon: Eliseo Willis MD;  Location: [facility] MAIN OR;  Service: Vascular;  Laterality: Right;   • COLONOSCOPY      over 20 years ago.  no polyps    • COLONOSCOPY N/A 9/18/2018    Procedure: COLONOSCOPY;  Surgeon: Jose Enrique Louie MD;  Location: [facility] ENDOSCOPY;  Service: Gastroenterology   • COLONOSCOPY N/A 9/19/2018    IH, EH, polyps (TAs w/low grade dysplasia)   • COLONOSCOPY N/A 6/1/2020    Procedure: COLONOSCOPY;  Surgeon: Jose Enrique Louie MD;  Location: [facility] ENDOSCOPY;  Service: Gastroenterology;  Laterality: N/A;  Pre op-Anemia, Melena, History of Polyps  Post op-To Cecum/TI, Polyp, Poor Prep   • CORONARY ARTERY BYPASS GRAFT  11/2001   • ENDOSCOPY N/A 5/29/2020    Procedure: ESOPHAGOGASTRODUODENOSCOPY with biopsies;  Surgeon: Amanda Lovelace MD;  Location: [facility] ENDOSCOPY;  Service: Gastroenterology;  Laterality: N/A;  pre-anemia, dark stools  post-esophagitis, hiatal hernia   • ENDOSCOPY N/A 9/15/2020    Procedure: ESOPHAGOGASTRODUODENOSCOPY WITH BIOPSIES;  Surgeon: Jose Enrique Louie MD;  Location: [facility] ENDOSCOPY;  Service: Gastroenterology;  Laterality: N/A;  pre: history of melena and esophagitis  post: mild esophagitis and gastritis, small hiatal hernia   • HERNIA  REPAIR Left 12/05/2019   • TONSILLECTOMY     • VASECTOMY        General Information     [facility] Name 09/17/22 1158          Physical Therapy Time and Intention    Document Type therapy note (daily note)  -     Mode of Treatment physical therapy  -     Row Name 09/17/22 1158          General Information    Patient Profile Reviewed yes  -     Existing Precautions/Restrictions fall  -[facility] Name 09/17/22 1158          Cognition    Orientation Status (Cognition) oriented to;person;place  -[facility] Name 09/17/22 1158          Safety Issues, Functional Mobility    Impairments Affecting Function (Mobility) strength;endurance/activity tolerance;postural/trunk control;balance  -           User Key  (r) = Recorded By, (t) = Taken By, (c) = Cosigned By    Initials Name Provider Type     Yoni Valencia, PT Physical Therapist               Mobility     Row Name 09/17/22 1159          Bed Mobility    Supine-Sit Luna (Bed Mobility) standby assist  -     Assistive Device (Bed Mobility) head of bed elevated  -[facility] Name 09/17/22 1159          Sit-Stand Transfer    Sit-Stand Luna (Transfers) verbal cues;contact guard  -     Assistive Device (Sit-Stand Transfers) walker, front-wheeled  -[facility] Name 09/17/22 1159          Gait/Stairs (Locomotion)    Luna Level (Gait) verbal cues;contact guard  -     Assistive Device (Gait) walker, front-wheeled  -     Distance in Feet (Gait) 100'  -     Deviations/Abnormal Patterns (Gait) eli decreased;stride length decreased  -     Bilateral Gait Deviations forward flexed posture  -           User Key  (r) = Recorded By, (t) = Taken By, (c) = Cosigned By    Initials Name Provider Type     Yoni Valencia, PT Physical Therapist               Obj/Interventions    No documentation.                Goals/Plan    No documentation.                Clinical Impression     Row Name 09/17/22 1200          Pain    Pretreatment Pain Rating 0/10 - no  pain  -     Row Name 09/17/22 1200          Plan of Care Review    Plan of Care Reviewed With patient  -     Progress improving  -     Row Name 09/17/22 1200          Positioning and Restraints    Pre-Treatment Position in bed  -     Post Treatment Position bathroom  -     Bathroom notified nsg;sitting;call light within reach;encouraged to call for assist  -           User Key  (r) = Recorded By, (t) = Taken By, (c) = Cosigned By    Initials Name Provider Type    Yoni Oscar, PT Physical Therapist               Outcome Measures     Row Name 09/17/22 1200          How much help from another person do you currently need...    Turning from your back to your side while in flat bed without using bedrails? 4  -CH     Moving from lying on back to sitting on the side of a flat bed without bedrails? 4  -CH     Moving to and from a bed to a chair (including a wheelchair)? 3  -CH     Standing up from a chair using your arms (e.g., wheelchair, bedside chair)? 3  -CH     Climbing 3-5 steps with a railing? 2  -CH     To walk in hospital room? 3  -CH     AM-PAC 6 Clicks Score (PT) 19  -CH     Highest level of mobility 6 --> Walked 10 steps or more  -           User Key  (r) = Recorded By, (t) = Taken By, (c) = Cosigned By    Initials Name Provider Type    Yoni Oscar, PT Physical Therapist                             Physical Therapy Education                 Title: PT OT SLP Therapies (In Progress)     Topic: Physical Therapy (Done)     Point: Mobility training (Done)     Learning Progress Summary           Patient Acceptance, E, VU by  at 9/17/2022 1201    Acceptance, E,TB,D, VU,NR by  at 9/16/2022 1351                   Point: Home exercise program (Done)     Learning Progress Summary           Patient Acceptance, E, VU by  at 9/17/2022 1201                   Point: Body mechanics (Done)     Learning Progress Summary           Patient Acceptance, E, VU by  at 9/17/2022 1201                    Point: Precautions (Done)     Learning Progress Summary           Patient Acceptance, E, VU by  at 9/17/2022 1201                               User Key     Initials Effective Dates Name Provider Type Discipline     06/16/21 -  Jessica Chino PT Physical Therapist PT     06/16/21 -  Yoni Valencia PT Physical Therapist PT              PT Recommendation and Plan     Plan of Care Reviewed With: patient  Progress: improving     Time Calculation:    PT Charges     Row Name 09/17/22 1203             Time Calculation    Start Time 1136  -      Stop Time 1147  -      Time Calculation (min) 11 min  -      PT Received On 09/17/22  -      PT - Next Appointment 09/18/22  -      PT Goal Re-Cert Due Date 09/23/22  -            User Key  (r) = Recorded By, (t) = Taken By, (c) = Cosigned By    Initials Name Provider Type     Yoni Valencia, PT Physical Therapist              Therapy Charges for Today     Code Description Service Date Service Provider Modifiers Qty    [id number] HC PT THER PROC EA 15 MIN 9/17/2022 Yoni Valencia, PT GP 1          PT G-Codes  Outcome Measure Options: AM-PAC 6 Clicks Basic Mobility (PT)  AM-PAC 6 Clicks Score (PT): 19    Yoni Valencia PT  9/17/2022

## 2022-09-17 NOTE — PLAN OF CARE
Goal Outcome Evaluation:  Plan of Care Reviewed With: patient           Outcome Evaluation: Pt admit  from home with falls. History includes CVA with ? cognitive delays.  Pt reports (+) for new CVA. Pt seen by OT and is alert, oriented but delay in processing with answering questions which pt reports is new.  B UE functional.  Pt moves to EOB and stands with CGA.  Transport present and pt walks to stretcher after assist to tighten brief. Overall mobility at walker with CGA but guide assist and flexed posture.  Will cont to follow for skilled OT to increase activity tolerance, safety and independence.

## 2022-09-17 NOTE — PLAN OF CARE
Goal Outcome Evaluation:         Pt up w/assistance x1, very unsteady. Pt has been using call light appropriately. Pt on Eliquis and Scd's applied.

## 2022-09-17 NOTE — PROGRESS NOTES
Name: Carlito Mo ADMIT: 9/15/2022   : 1955  PCP: Florencia Caballero MD    MRN: [id number] LOS: 1 days   AGE/SEX: 67 y.o. male  ROOM: [facility]   Subjective   Chief Complaint   Patient presents with   • Fall   • Weakness - Generalized     Multiple falls prior to admission  H/o afib  H/o stroke  Had MRI    ROS  No f/c  No n/v  No cp/palp  No soa/cough    Objective   Vital Signs  Temp:  [97.7 °F (36.5 °C)-98 °F (36.7 °C)] 97.8 °F (36.6 °C)  Heart Rate:  [56-65] 59  Resp:  [18-20] 20  BP: (124-167)/(66-80) 124/75  SpO2:  [95 %-100 %] 95 %  on   ;   Device (Oxygen Therapy): room air  Body mass index is 22.3 kg/m².    Physical Exam  HENT:      Head: Normocephalic and atraumatic.   Eyes:      General: No scleral icterus.  Cardiovascular:      Rate and Rhythm: Normal rate and regular rhythm.      Heart sounds: Normal heart sounds.   Pulmonary:      Effort: Pulmonary effort is normal. No respiratory distress.      Breath sounds: Normal breath sounds.   Abdominal:      General: There is no distension.      Palpations: Abdomen is soft.      Tenderness: There is no abdominal tenderness.   Musculoskeletal:      Cervical back: Neck supple.   Skin:     Coloration: Skin is pale.   Neurological:      Mental Status: He is alert.   Psychiatric:         Behavior: Behavior normal.     chronically ill    Results Review:       I reviewed the patient's new clinical results.  Results from last 7 days   Lab Units 22  0612 22  0434 09/15/22  2207   WBC 10*3/mm3 5.59 6.35 7.82   HEMOGLOBIN g/dL 9.6* 10.1* 11.2*   PLATELETS 10*3/mm3 216 233 242     Results from last 7 days   Lab Units 22  0612 22  0434 09/15/22  2207   SODIUM mmol/L 138 138 141   POTASSIUM mmol/L 4.4 3.8 4.1   CHLORIDE mmol/L 105 102 101   CO2 mmol/L 24.5 25.9 24.3   BUN mg/dL 60* 73* 75*   CREATININE mg/dL 2.96* 4.19* 4.50*   GLUCOSE mg/dL 117* 228* 103*   Estimated Creatinine Clearance: 25.6 mL/min (A) (by C-G formula based on SCr of 2.96  mg/dL (H)).  Results from last 7 days   Lab Units 09/15/22  2207   ALBUMIN g/dL 3.90   BILIRUBIN mg/dL 0.3   ALK PHOS U/L 76   AST (SGOT) U/L 26   ALT (SGPT) U/L 30     Results from last 7 days   Lab Units 09/17/22  0612 09/16/22  0434 09/15/22  2207   CALCIUM mg/dL 8.5* 8.8 9.5   ALBUMIN g/dL  --   --  3.90     Results from last 7 days   Lab Units 09/16/22  0434 09/15/22  2207   LACTATE mmol/L 0.9 1.0       Coag     HbA1C   Lab Results   Component Value Date    HGBA1C 8.10 (H) 09/17/2022    HGBA1C 7.30 (H) 07/25/2022    HGBA1C 6.90 (H) 06/05/2022     Infection     Radiology(recent) CT Head Without Contrast    Result Date: 9/16/2022  No acute intracranial findings.  Radiation dose reduction techniques were utilized, including automated exposure control and exposure modulation based on body size.  This report was finalized on 9/16/2022 12:07 AM by Dr. Alice Allen M.D.      MRI Brain Without Contrast    Result Date: 9/17/2022  1. Since prior MRI of the brain 05/21/2021 the patient has developed a 5 mm rounded acute infarct in the anterior inferior medial right temporal lobe anterior medial to the anterior tip of the temporal horn of the right lateral ventricle and this is in the distribution of the anterior temporal branch of the right middle cerebral artery territory. There is a 7 x 4 mm subacute infarct in the medial left parietoccipital region likely in the left PCA territory. The patient has also developed a 5 mm focus of hemosiderin deposition from a tiny old microhemorrhage the lateral right occipital lobe, etiology uncertain, could be an old focus of petechial hemorrhage into an old infarct versus secondary to amyloid. Furthermore the patient has developed a chronic lacunar infarct in the mid right corona radiata region measuring 10 x 4 mm in size. 2. Otherwise there is no change when compared to prior MRI of the brain 05/21/2021. A 67-year-old patient who has very extensive T2 high signal throughout the  cerebral white matter compatible with severe small vessel disease, multiple old lacunar infarcts in the thalami bilaterally, posterior right putamen and body of the left caudate nucleus. The patient has a 1.8 x 1 cm old posterior lateral left occipital infarct in the left MCA territory, multiple small subcentimeter old lateral left frontal infarcts in the left MCA territory, a 2.6 x 1.2 cm old superior medial right cerebellar infarct in the right superior cerebellar artery territory, and tiny 4-6 mm old inferior lateral cerebellar infarcts bilaterally in the PICA territories. The patient has chronic subtotal opacification of the right maxillary sinus with a 3.5 x 2.5 cm mucous retention cyst. The remainder of the MRI of the brain is unremarkable. The results were communicated to Asim Hogue MD, the hospitalist taking care of the patient by telephone 09/17/2022 at 12:20 PM.        Troponin T   Date Value Ref Range Status   09/15/2022 0.133 (C) 0.000 - 0.030 ng/mL Final     No components found for: TSH;2    amLODIPine, 5 mg, Oral, Q24H  apixaban, 2.5 mg, Oral, Q12H  vitamin C, 1,000 mg, Oral, Daily  aspirin, 81 mg, Oral, Daily  [START ON 9/18/2022] atorvastatin, 80 mg, Oral, Nightly  buPROPion XL, 300 mg, Oral, Daily  carvedilol, 3.125 mg, Oral, BID With Meals  cholecalciferol, 5,000 Units, Oral, Daily  famotidine, 10 mg, Oral, BID  insulin lispro, 0-7 Units, Subcutaneous, TID AC  insulin lispro, 2 Units, Subcutaneous, TID With Meals  levothyroxine, 75 mcg, Oral, Daily  sodium chloride, 10 mL, Intravenous, Q12H  sodium chloride, 10 mL, Intravenous, Q12H  tamsulosin, 0.4 mg, Oral, Daily       Diet Regular; Cardiac, Consistent Carbohydrate, Renal      Assessment & Plan      Active Hospital Problems    Diagnosis  POA   • **Acute ischemic stroke (HCC) [I63.9]  Unknown   • Dehydration [E86.0]  Yes   • SYLVAIN (acute kidney injury) (HCC) [N17.9]  Yes   • Multiple falls [R29.6]  Not Applicable   • Paroxysmal atrial  fibrillation ([facility]) [I48.0]  Yes   • Chronic anticoagulation [Z79.01]  Not Applicable   • CKD (chronic kidney disease) stage 4, GFR 15-29 ml/min ([facility]) [N18.4]  Yes   • Chronically elevated troponin [R77.8]  Yes   • Coronary artery disease involving native coronary artery of native heart without angina pectoris [I25.10]  Yes   • YAW (obstructive sleep apnea) [G47.33]  Yes   • Recurrent strokes ([facility]) [I63.9]  Yes   • Acquired hypothyroidism [E03.9]  Yes   • Benign prostatic hyperplasia with nocturia [N40.1, R35.1]  Yes   • High blood pressure associated with diabetes ([facility]) [E11.59, I15.2]  Yes   • Type 2 diabetes mellitus with stage 5 chronic kidney disease not on chronic dialysis, with long-term current use of insulin ([facility]) [E11.22, N18.5, Z79.4]  Not Applicable      Resolved Hospital Problems   No resolved problems to display.         Mr. Mo is a 67 y.o. numerous comorbidities with a recent admission for general weakness that presents with continued decline, SYLVAIN on CKD and dehydration.        Acute on chronic kidney disease 4/ dehydration  -Creatinine elevated 4.5 with orthostatic positive and poor po intake. Crt decreased with IVF  -Nephrology following   [place]     Acute stroke with also chronic strokes  PT OT, CPP   neurology following  He does have a couple of small strokes, may have missed doses of eliquis. Dose now increased to 5mg BID as well.      chronic combined systolic heart failure  - hold lasix, dehydration and SYLVAIN on admission        Type 2 diabetes  -A1c 8.1.  Does not check glucose regularly at home.  Correctional insulin and avoid hypoglycemia. On agents here. Not consistently using insulin at home unfortunately      Paroxysmal atrial fibrillation  -Heart rate controlled  -Continue eliquis     Anemia  -Anemia of chronic disease.  Monitor.       Chronically elevated troponin  -In the setting of CKD and SYLVAIN.  Denies chest pain, troponin at baseline.  No acute ischemic change on  EKG.     Hypothyroidism  -continue replacement.          VTE Prophylaxis - [employer] Logan   Reviewed records      Asim Hogue MD  [facility]  09/17/22  14:51 EDT

## 2022-09-17 NOTE — PLAN OF CARE
Goal Outcome Evaluation:  Plan of Care Reviewed With: patient        Progress: improving     Inc amb distance to 100' using rwx and CGA from PT. Pt demonstrated no unsteadiness while up on feet, only a slower gait speed and fwd flexed posture. Pt concluded PT with transfer to toilet, PT alerted [facility] staff pt whereabouts, and instructed pt to not get up on his own, but to pull the cord and wait for assistance. Pt will continue to benefit from skilled PT to address remaining functional mobility deficits and to reach functional goals.     Patient was not wearing a face mask during this therapy encounter. Therapist used appropriate personal protective equipment including mask and gloves.  Mask used was standard procedure mask. Appropriate PPE was worn during the entire therapy session. Hand hygiene was completed before and after therapy session. Patient is not in enhanced droplet precautions.

## 2022-09-18 ENCOUNTER — APPOINTMENT (OUTPATIENT)
Dept: CARDIOLOGY | Facility: HOSPITAL | Age: 67
End: 2022-09-18

## 2022-09-18 LAB
ALBUMIN SERPL-MCNC: 3.4 G/DL (ref 3.5–5.2)
ANION GAP SERPL CALCULATED.3IONS-SCNC: 7 MMOL/L (ref 5–15)
BH CV ECHO MEAS - ACS: 1.95 CM
BH CV ECHO MEAS - AI P1/2T: 1309 MSEC
BH CV ECHO MEAS - AO MAX PG: 6 MMHG
BH CV ECHO MEAS - AO MEAN PG: 3.5 MMHG
BH CV ECHO MEAS - AO ROOT DIAM: 3.3 CM
BH CV ECHO MEAS - AO V2 MAX: 122.7 CM/SEC
BH CV ECHO MEAS - AO V2 VTI: 28.1 CM
BH CV ECHO MEAS - AVA(I,D): 2.01 CM2
BH CV ECHO MEAS - EDV(CUBED): 170.6 ML
BH CV ECHO MEAS - EDV(MOD-SP2): 146 ML
BH CV ECHO MEAS - EDV(MOD-SP4): 153 ML
BH CV ECHO MEAS - EF(MOD-BP): 51 %
BH CV ECHO MEAS - EF(MOD-SP2): 54.1 %
BH CV ECHO MEAS - EF(MOD-SP4): 46.4 %
BH CV ECHO MEAS - EF_3D-VOL: 56 %
BH CV ECHO MEAS - ESV(MOD-SP2): 67 ML
BH CV ECHO MEAS - ESV(MOD-SP4): 82 ML
BH CV ECHO MEAS - FS: 28.8 %
BH CV ECHO MEAS - IVS/LVPW: 0.96 CM
BH CV ECHO MEAS - IVSD: 0.99 CM
BH CV ECHO MEAS - LAT PEAK E' VEL: 7.5 CM/SEC
BH CV ECHO MEAS - LV DIASTOLIC VOL/BSA (35-75): 78.1 CM2
BH CV ECHO MEAS - LV MASS(C)D: 218.8 GRAMS
BH CV ECHO MEAS - LV MAX PG: 2.8 MMHG
BH CV ECHO MEAS - LV MEAN PG: 1.46 MMHG
BH CV ECHO MEAS - LV SYSTOLIC VOL/BSA (12-30): 41.9 CM2
BH CV ECHO MEAS - LV V1 MAX: 82.9 CM/SEC
BH CV ECHO MEAS - LV V1 VTI: 17.8 CM
BH CV ECHO MEAS - LVIDD: 5.5 CM
BH CV ECHO MEAS - LVIDS: 3.9 CM
BH CV ECHO MEAS - LVOT AREA: 3.2 CM2
BH CV ECHO MEAS - LVOT DIAM: 2.01 CM
BH CV ECHO MEAS - LVPWD: 1.03 CM
BH CV ECHO MEAS - MED PEAK E' VEL: 6.1 CM/SEC
BH CV ECHO MEAS - MR MAX PG: 36.8 MMHG
BH CV ECHO MEAS - MR MAX VEL: 303.1 CM/SEC
BH CV ECHO MEAS - MV A DUR: 0.14 SEC
BH CV ECHO MEAS - MV A MAX VEL: 86.1 CM/SEC
BH CV ECHO MEAS - MV DEC SLOPE: 403 CM/SEC2
BH CV ECHO MEAS - MV DEC TIME: 0.21 MSEC
BH CV ECHO MEAS - MV E MAX VEL: 87.4 CM/SEC
BH CV ECHO MEAS - MV E/A: 1.01
BH CV ECHO MEAS - MV MAX PG: 3.5 MMHG
BH CV ECHO MEAS - MV MEAN PG: 1.93 MMHG
BH CV ECHO MEAS - MV P1/2T: 64.2 MSEC
BH CV ECHO MEAS - MV V2 VTI: 37.1 CM
BH CV ECHO MEAS - MVA(P1/2T): 3.4 CM2
BH CV ECHO MEAS - MVA(VTI): 1.52 CM2
BH CV ECHO MEAS - PA ACC TIME: 0.13 SEC
BH CV ECHO MEAS - PA PR(ACCEL): 20.4 MMHG
BH CV ECHO MEAS - PA V2 MAX: 125.7 CM/SEC
BH CV ECHO MEAS - PULM A REVS DUR: 0.12 SEC
BH CV ECHO MEAS - PULM A REVS VEL: 14.4 CM/SEC
BH CV ECHO MEAS - PULM DIAS VEL: 23.6 CM/SEC
BH CV ECHO MEAS - PULM S/D: 0.99
BH CV ECHO MEAS - PULM SYS VEL: 23.3 CM/SEC
BH CV ECHO MEAS - RAP SYSTOLE: 3 MMHG
BH CV ECHO MEAS - RV MAX PG: 2.02 MMHG
BH CV ECHO MEAS - RV V1 MAX: 71.1 CM/SEC
BH CV ECHO MEAS - RV V1 VTI: 14 CM
BH CV ECHO MEAS - RVSP: 22.9 MMHG
BH CV ECHO MEAS - SI(MOD-SP2): 40.3 ML/M2
BH CV ECHO MEAS - SI(MOD-SP4): 36.3 ML/M2
BH CV ECHO MEAS - SV(LVOT): 56.5 ML
BH CV ECHO MEAS - SV(MOD-SP2): 79 ML
BH CV ECHO MEAS - SV(MOD-SP4): 71 ML
BH CV ECHO MEAS - TAPSE (>1.6): 2.29 CM
BH CV ECHO MEAS - TR MAX PG: 19.9 MMHG
BH CV ECHO MEAS - TR MAX VEL: 222.9 CM/SEC
BH CV ECHO MEASUREMENTS AVERAGE E/E' RATIO: 12.85
BH CV XLRA - TDI S': 11 CM/SEC
BH CV XLRA MEAS LEFT DIST CCA EDV: -12.3 CM/SEC
BH CV XLRA MEAS LEFT DIST CCA PSV: -76.2 CM/SEC
BH CV XLRA MEAS LEFT DIST ICA EDV: -19.6 CM/SEC
BH CV XLRA MEAS LEFT DIST ICA PSV: -100.2 CM/SEC
BH CV XLRA MEAS LEFT ICA/CCA RATIO: 1.31
BH CV XLRA MEAS LEFT MID ICA EDV: -14.6 CM/SEC
BH CV XLRA MEAS LEFT MID ICA PSV: -86.7 CM/SEC
BH CV XLRA MEAS LEFT PROX CCA EDV: 10.8 CM/SEC
BH CV XLRA MEAS LEFT PROX CCA PSV: 89.9 CM/SEC
BH CV XLRA MEAS LEFT PROX ECA PSV: -131.8 CM/SEC
BH CV XLRA MEAS LEFT PROX ICA EDV: 14.9 CM/SEC
BH CV XLRA MEAS LEFT PROX ICA PSV: 90.2 CM/SEC
BH CV XLRA MEAS LEFT PROX SCLA PSV: 203.7 CM/SEC
BH CV XLRA MEAS LEFT VERTEBRAL A EDV: 5.5 CM/SEC
BH CV XLRA MEAS LEFT VERTEBRAL A PSV: 55.7 CM/SEC
BH CV XLRA MEAS RIGHT DIST CCA EDV: -11.2 CM/SEC
BH CV XLRA MEAS RIGHT DIST CCA PSV: -73.3 CM/SEC
BH CV XLRA MEAS RIGHT DIST ICA EDV: -17.7 CM/SEC
BH CV XLRA MEAS RIGHT DIST ICA PSV: -71.3 CM/SEC
BH CV XLRA MEAS RIGHT ICA/CCA RATIO: 1.07
BH CV XLRA MEAS RIGHT MID ICA EDV: -16.2 CM/SEC
BH CV XLRA MEAS RIGHT MID ICA PSV: -78.6 CM/SEC
BH CV XLRA MEAS RIGHT PROX CCA EDV: 10.6 CM/SEC
BH CV XLRA MEAS RIGHT PROX CCA PSV: 87.6 CM/SEC
BH CV XLRA MEAS RIGHT PROX ECA PSV: -111.8 CM/SEC
BH CV XLRA MEAS RIGHT PROX ICA EDV: -13.6 CM/SEC
BH CV XLRA MEAS RIGHT PROX ICA PSV: -70.7 CM/SEC
BH CV XLRA MEAS RIGHT PROX SCLA PSV: 151.7 CM/SEC
BH CV XLRA MEAS RIGHT VERTEBRAL A EDV: 5.4 CM/SEC
BH CV XLRA MEAS RIGHT VERTEBRAL A PSV: 60.4 CM/SEC
BUN SERPL-MCNC: 54 MG/DL (ref 8–23)
BUN/CREAT SERPL: 19.6 (ref 7–25)
CALCIUM SPEC-SCNC: 8.6 MG/DL (ref 8.6–10.5)
CHLORIDE SERPL-SCNC: 104 MMOL/L (ref 98–107)
CO2 SERPL-SCNC: 25 MMOL/L (ref 22–29)
CREAT SERPL-MCNC: 2.76 MG/DL (ref 0.76–1.27)
EGFRCR SERPLBLD CKD-EPI 2021: 24.4 ML/MIN/1.73
GLUCOSE BLDC GLUCOMTR-MCNC: 117 MG/DL (ref 70–130)
GLUCOSE BLDC GLUCOMTR-MCNC: 145 MG/DL (ref 70–130)
GLUCOSE BLDC GLUCOMTR-MCNC: 197 MG/DL (ref 70–130)
GLUCOSE BLDC GLUCOMTR-MCNC: 208 MG/DL (ref 70–130)
GLUCOSE SERPL-MCNC: 169 MG/DL (ref 65–99)
LEFT ARM BP: NORMAL MMHG
LEFT ATRIUM VOLUME INDEX: 42.6 ML/M2
MAXIMAL PREDICTED HEART RATE: 153 BPM
MAXIMAL PREDICTED HEART RATE: 153 BPM
PHOSPHATE SERPL-MCNC: 3.3 MG/DL (ref 2.5–4.5)
POTASSIUM SERPL-SCNC: 4.8 MMOL/L (ref 3.5–5.2)
QT INTERVAL: 480 MS
RIGHT ARM BP: NORMAL MMHG
SINUS: 3 CM
SODIUM SERPL-SCNC: 136 MMOL/L (ref 136–145)
STJ: 2.4 CM
STRESS TARGET HR: 130 BPM
STRESS TARGET HR: 130 BPM

## 2022-09-18 PROCEDURE — 93306 TTE W/DOPPLER COMPLETE: CPT

## 2022-09-18 PROCEDURE — 93880 EXTRACRANIAL BILAT STUDY: CPT

## 2022-09-18 PROCEDURE — 82962 GLUCOSE BLOOD TEST: CPT

## 2022-09-18 PROCEDURE — 93306 TTE W/DOPPLER COMPLETE: CPT | Performed by: INTERNAL MEDICINE

## 2022-09-18 PROCEDURE — 63710000001 INSULIN LISPRO (HUMAN) PER 5 UNITS: Performed by: HOSPITALIST

## 2022-09-18 PROCEDURE — 99232 SBSQ HOSP IP/OBS MODERATE 35: CPT | Performed by: NURSE PRACTITIONER

## 2022-09-18 PROCEDURE — 63710000001 INSULIN LISPRO (HUMAN) PER 5 UNITS: Performed by: NURSE PRACTITIONER

## 2022-09-18 PROCEDURE — 25010000002 PERFLUTREN (DEFINITY) 8.476 MG IN SODIUM CHLORIDE (PF) 0.9 % 10 ML INJECTION: Performed by: NURSE PRACTITIONER

## 2022-09-18 PROCEDURE — 80069 RENAL FUNCTION PANEL: CPT | Performed by: INTERNAL MEDICINE

## 2022-09-18 RX ORDER — POLYETHYLENE GLYCOL 3350 17 G/17G
17 POWDER, FOR SOLUTION ORAL DAILY PRN
Status: DISCONTINUED | OUTPATIENT
Start: 2022-09-18 | End: 2022-09-19 | Stop reason: HOSPADM

## 2022-09-18 RX ADMIN — POLYETHYLENE GLYCOL 3350 17 G: 17 POWDER, FOR SOLUTION ORAL at 06:08

## 2022-09-18 RX ADMIN — ATORVASTATIN CALCIUM 80 MG: 80 TABLET, FILM COATED ORAL at 20:18

## 2022-09-18 RX ADMIN — Medication 10 ML: at 20:20

## 2022-09-18 RX ADMIN — FAMOTIDINE 10 MG: 20 TABLET ORAL at 08:51

## 2022-09-18 RX ADMIN — APIXABAN 5 MG: 5 TABLET, FILM COATED ORAL at 17:13

## 2022-09-18 RX ADMIN — Medication 5000 UNITS: at 08:51

## 2022-09-18 RX ADMIN — TAMSULOSIN HYDROCHLORIDE 0.4 MG: 0.4 CAPSULE ORAL at 08:51

## 2022-09-18 RX ADMIN — INSULIN LISPRO 2 UNITS: 100 INJECTION, SOLUTION INTRAVENOUS; SUBCUTANEOUS at 17:13

## 2022-09-18 RX ADMIN — LEVOTHYROXINE SODIUM 75 MCG: 0.07 TABLET ORAL at 08:51

## 2022-09-18 RX ADMIN — OXYCODONE HYDROCHLORIDE AND ACETAMINOPHEN 1000 MG: 500 TABLET ORAL at 08:51

## 2022-09-18 RX ADMIN — INSULIN LISPRO 2 UNITS: 100 INJECTION, SOLUTION INTRAVENOUS; SUBCUTANEOUS at 08:51

## 2022-09-18 RX ADMIN — INSULIN LISPRO 2 UNITS: 100 INJECTION, SOLUTION INTRAVENOUS; SUBCUTANEOUS at 12:47

## 2022-09-18 RX ADMIN — Medication 10 ML: at 08:54

## 2022-09-18 RX ADMIN — FAMOTIDINE 10 MG: 20 TABLET ORAL at 20:17

## 2022-09-18 RX ADMIN — APIXABAN 5 MG: 5 TABLET, FILM COATED ORAL at 06:08

## 2022-09-18 RX ADMIN — CARVEDILOL 3.12 MG: 3.12 TABLET, FILM COATED ORAL at 08:51

## 2022-09-18 RX ADMIN — CARVEDILOL 3.12 MG: 3.12 TABLET, FILM COATED ORAL at 17:13

## 2022-09-18 RX ADMIN — BUPROPION HYDROCHLORIDE 300 MG: 300 TABLET, EXTENDED RELEASE ORAL at 08:51

## 2022-09-18 RX ADMIN — PERFLUTREN 1 ML: 6.52 INJECTION, SUSPENSION INTRAVENOUS at 14:22

## 2022-09-18 RX ADMIN — AMLODIPINE BESYLATE 5 MG: 5 TABLET ORAL at 08:51

## 2022-09-18 NOTE — PLAN OF CARE
Goal Outcome Evaluation:  Plan of Care Reviewed With: patient        Progress: improving  Outcome Evaluation: VSS, no c/o of painor nausea, PRN Miralax ordered, urine specimen collected, ECHO & Doppler of CAR ordered, NIH 0, will CTM

## 2022-09-18 NOTE — PROGRESS NOTES
"DOS: 2022  NAME: Carlito Mo   : 1955  PCP: Florencia Caballero MD    Chief Complaint   Patient presents with   • Fall   • Weakness - Generalized        Stroke    Subjective: No acute events overnight.  States he did not get much sleep last night.  He has been seeing floaters.  Got up to walk to the stretcher today and stated \"I survived walking\".  Denies any new weakness, numbness, speech or visual disturbances, or headaches.  I just returned back from getting his carotid ultrasound.    Objective:  Vital signs:      Vitals:    22 1320 22 1921 22 2324 22 0658   BP: 124/75 133/71 140/71 148/66   BP Location: Left arm Left arm Left arm Left arm   Patient Position: Sitting Lying Lying Lying   Pulse: 59 57 57 62   Resp:    Temp: 97.8 °F (36.6 °C) 97.2 °F (36.2 °C) 97.6 °F (36.4 °C) 97.8 °F (36.6 °C)   TempSrc: Oral Oral Oral Oral   SpO2: 95% 99% 100% 100%   Weight:       Height:           Current Facility-Administered Medications:   •  acetaminophen (TYLENOL) tablet 650 mg, 650 mg, Oral, Q4H PRN **OR** acetaminophen (TYLENOL) 160 MG/5ML solution 650 mg, 650 mg, Oral, Q4H PRN **OR** acetaminophen (TYLENOL) suppository 650 mg, 650 mg, Rectal, Q4H PRN, Maya Archibald APRN  •  amLODIPine (NORVASC) tablet 5 mg, 5 mg, Oral, Q24H, Ilir Briones MD, 5 mg at 22 0851  •  apixaban (ELIQUIS) tablet 5 mg, 5 mg, Oral, Q12H, Asim Hogue MD, 5 mg at 22 0608  •  ascorbic acid (VITAMIN C) tablet 1,000 mg, 1,000 mg, Oral, Daily, Delmar Tse MD, 1,000 mg at 22 0851  •  atorvastatin (LIPITOR) tablet 80 mg, 80 mg, Oral, Nightly, Chela Lund APRN  •  buPROPion XL (WELLBUTRIN XL) 24 hr tablet 300 mg, 300 mg, Oral, Daily, Delmar Tse MD, 300 mg at 22 08  •  carvedilol (COREG) tablet 3.125 mg, 3.125 mg, Oral, BID With Meals, Ilir Briones MD, 3.125 mg at 22 08  •  cholecalciferol (VITAMIN D3) tablet 5,000 Units, 5,000 " Units, Oral, Daily, Delmar Tse MD, 5,000 Units at 09/18/22 0851  •  dextrose (D50W) (25 g/50 mL) IV injection 25 g, 25 g, Intravenous, Q15 Min PRN, Maya Archibald APRN  •  dextrose (GLUTOSE) oral gel 15 g, 15 g, Oral, Q15 Min PRN, Maya Archibald APRN  •  famotidine (PEPCID) tablet 10 mg, 10 mg, Oral, BID, Delmar Tse MD, 10 mg at 09/18/22 0851  •  glucagon (human recombinant) (GLUCAGEN DIAGNOSTIC) injection 1 mg, 1 mg, Intramuscular, Q15 Min PRN, Maya Archibald APRN  •  insulin lispro (ADMELOG) injection 0-7 Units, 0-7 Units, Subcutaneous, TID AC, Maya Archibald APRN, 2 Units at 09/17/22 1816  •  insulin lispro (ADMELOG) injection 2 Units, 2 Units, Subcutaneous, TID With Meals, Delmar Tse MD, 2 Units at 09/18/22 0851  •  levothyroxine (SYNTHROID, LEVOTHROID) tablet 75 mcg, 75 mcg, Oral, Daily, Delmar Tse MD, 75 mcg at 09/18/22 0851  •  ondansetron (ZOFRAN) injection 4 mg, 4 mg, Intravenous, Q6H PRN, Maya Archibald APRN  •  polyethylene glycol (MIRALAX) packet 17 g, 17 g, Oral, Daily PRN, Ashley Lee, JI, 17 g at 09/18/22 0608  •  [COMPLETED] Insert peripheral IV, , , Once **AND** sodium chloride 0.9 % flush 10 mL, 10 mL, Intravenous, PRN, Gregory Marie MD  •  sodium chloride 0.9 % flush 10 mL, 10 mL, Intravenous, Q12H, Maya Archibald APRN, 10 mL at 09/18/22 0854  •  sodium chloride 0.9 % flush 10 mL, 10 mL, Intravenous, PRN, Maya Archibald APRN  •  sodium chloride 0.9 % flush 10 mL, 10 mL, Intravenous, Q12H, Chela Lund APRN, 10 mL at 09/18/22 0854  •  sodium chloride 0.9 % flush 10 mL, 10 mL, Intravenous, PRN, Chela Lund APRN  •  tamsulosin (FLOMAX) 24 hr capsule 0.4 mg, 0.4 mg, Oral, Daily, Ilir Briones MD, 0.4 mg at 09/18/22 0851    PRN meds  •  acetaminophen **OR** acetaminophen **OR** acetaminophen  •  dextrose  •  dextrose  •  glucagon (human recombinant)  •  ondansetron  •  polyethylene glycol  •  [COMPLETED]  Insert peripheral IV **AND** sodium chloride  •  sodium chloride  •  sodium chloride    No current facility-administered medications on file prior to encounter.     Current Outpatient Medications on File Prior to Encounter   Medication Sig   • amLODIPine (NORVASC) 10 MG tablet Take 10 mg by mouth Daily.   • aspirin 81 MG EC tablet Take 81 mg by mouth Daily. INSTR TO FOLLOW MD GUIDELINE ON WHEN/IF TO HOLD DAY OF SURGERY   • atorvastatin (LIPITOR) 20 MG tablet Take 20 mg by mouth every night at bedtime.   • buPROPion XL (WELLBUTRIN XL) 150 MG 24 hr tablet Take 300 mg by mouth Daily.   • carvedilol (COREG) 3.125 MG tablet Take 3.125 mg by mouth 2 (Two) Times a Day With Meals.   • Cholecalciferol (VITAMIN D3) 5000 units capsule capsule Take 5,000 Units by mouth Daily.   • Eliquis 2.5 MG tablet tablet TAKE 1 TABLET BY MOUTH EVERY TWELVE HOURS (Patient taking differently: Take 2.5 mg by mouth Daily.)   • famotidine (PEPCID) 10 MG tablet Take 10 mg by mouth 2 (Two) Times a Day.   • insulin glulisine (Apidra) 100 UNIT/ML injection Inject 2 Units under the skin into the appropriate area as directed 3 (Three) Times a Day Before Meals. Patient says he does not take consistently (Patient taking differently: Inject 2 Units under the skin into the appropriate area as directed 3 (Three) Times a Day Before Meals. HAS NOT STARTED TAKING YET, WAITING FOR SUPPLIES)   • levothyroxine (SYNTHROID, LEVOTHROID) 75 MCG tablet Take 1 tablet by mouth Daily.   • tamsulosin (FLOMAX) 0.4 MG capsule 24 hr capsule Take 1 capsule by mouth Every Night.   • torsemide (DEMADEX) 100 MG tablet Take 1 tablet by mouth Daily for 30 days. (Patient taking differently: Take 40 mg by mouth Daily.)   • vitamin C (ASCORBIC ACID) 250 MG tablet Take 1,000 mg by mouth Daily.   • Insulin Glargine (BASAGLAR KWIKPEN SC) Inject 4 Units under the skin into the appropriate area as directed Daily.   • Insulin Glargine (BASAGLAR KWIKPEN) 100 UNIT/ML injection pen Inject 4  Units under the skin into the appropriate area as directed Every Night. Patient says he does not take consistently (Patient taking differently: Inject 4 Units under the skin into the appropriate area as directed Every Night.)       General appearance: Chronically ill-appearing elderly male, NAD, alert and cooperative  HEENT: Normocephalic, atraumatic, no masses or tenderness  Resp: Even and unlabored  Extremities: No obvious edema  Skin: warm, dry     Neurological:   MS: oriented x3, able to correctly identify wall clock time,  normal attention/concentration, language intact, no neglect, normal fund of knowledge  CN: visual acuity grossly abnormal, visual fields full, EOMI, facial sensation equal, no facial droop, tongue midline  Motor: 5/5 in all 4 ext., normal tone  Sensory: light touch sensation intact in all 4 ext.  Coordination: Normal finger to nose test    Physical exam performed, changes noted.    Laboratory results:  Lab Results   Component Value Date    TSH 1.610 09/16/2022     Lab Results   Component Value Date    HGBA1C 8.10 (H) 09/17/2022     Lab Results   Component Value Date    CYZQVPJB51 844 09/17/2022     Lab Results   Component Value Date    CHOL 165 09/17/2022    CHOL 229 (H) 06/05/2022    CHOL 187 03/02/2022    CHLPL 159 11/20/2019    CHLPL 136 04/09/2019    CHLPL 159 12/18/2017     Lab Results   Component Value Date    TRIG 98 09/17/2022    TRIG 83 06/05/2022    TRIG 59 03/02/2022     Lab Results   Component Value Date    HDL 42 09/17/2022    HDL 42 06/05/2022    HDL 47 03/02/2022     Lab Results   Component Value Date     (H) 09/17/2022     (H) 06/05/2022     (H) 03/02/2022     Lab Results   Component Value Date    WBC 5.59 09/17/2022    HGB 9.6 (L) 09/17/2022    HCT 30.5 (L) 09/17/2022    MCV 82.0 09/17/2022     09/17/2022     Lab Results   Component Value Date    GLUCOSE 117 (H) 09/17/2022    BUN 60 (H) 09/17/2022    CREATININE 2.96 (H) 09/17/2022    EGFRIFNONA 19  (L) 02/04/2022    EGFRIFAFRI 74 12/12/2017    BCR 20.3 09/17/2022    K 4.4 09/17/2022    CO2 24.5 09/17/2022    CALCIUM 8.5 (L) 09/17/2022    PROTENTOTREF 6.7 12/12/2017    ALBUMIN 3.90 09/15/2022    LABIL2 1.5 11/20/2019    AST 26 09/15/2022    ALT 30 09/15/2022     Lab Results   Component Value Date    PTT 21.9 (L) 03/01/2022     Lab Results   Component Value Date    INR 1.61 (H) 07/24/2022    INR 1.13 (H) 06/01/2022    INR 1.07 03/01/2022    PROTIME 18.8 (H) 07/24/2022    PROTIME 14.4 (H) 06/01/2022    PROTIME 13.8 03/01/2022     Brief Urine Lab Results  (Last result in the past 365 days)      Color   Clarity   Blood   Leuk Est   Nitrite   Protein   CREAT   Urine HCG        09/15/22 2251 Yellow   Clear   Negative   Negative   Negative   100 mg/dL (2+)                 Review and interpretation of imaging:  CT Head Without Contrast    Result Date: 9/16/2022  No acute intracranial findings.  Radiation dose reduction techniques were utilized, including automated exposure control and exposure modulation based on body size.  This report was finalized on 9/16/2022 12:07 AM by Dr. Alice Allen M.D.      MRI Angiogram Head Without Contrast    Result Date: 9/17/2022  1. Limited whole brain MRI images are unchanged when compared to MRI of the brain earlier today 09/17/2022 at 9:07 AM. There is tiny 5 mm nodular acute infarct in the anteromedial right temporal lobe anteromedial to the anterior temporal horn of the right lateral ventricle within the distribution of the anterotemporal branch of the right middle cerebral artery territory. There is 5 mm subacute infarct in the left parietal-occipital region. Questionable 3 mm subacute infarct in the right parietal periventricular white matter and these may be in the left PCA territories. There is extensive chronic changes with severe small vessel disease in cerebral white matter. Multiple old lacunar infarcts in the thalami and posterior putamen bilaterally as well as in  the mid right corona radiata region. There is a 2 x 1.5 cm old posterior left parietal infarct and subcentimeter old lateral left frontal infarct in the left MCA territory. There is 2.6 x 1.5 cm old superior medial right cerebellar infarct in right superior cerebellar artery territory and a 5 mm old inferior medial left cerebellar infarct in the left PICA territory. 2. MRA of the head is within normal limits with no stenosis seen in the great vessels of the head.      MRI Brain Without Contrast    Result Date: 9/17/2022  1. Since prior MRI of the brain 05/21/2021 the patient has developed a 5 mm rounded acute infarct in the anterior inferior medial right temporal lobe anterior medial to the anterior tip of the temporal horn of the right lateral ventricle and this is in the distribution of the anterior temporal branch of the right middle cerebral artery territory. There is a 7 x 4 mm subacute infarct in the medial left parietoccipital region likely in the left PCA territory. The patient has also developed a 5 mm focus of hemosiderin deposition from a tiny old microhemorrhage the lateral right occipital lobe, etiology uncertain, could be an old focus of petechial hemorrhage into an old infarct versus secondary to amyloid. Furthermore the patient has developed a chronic lacunar infarct in the mid right corona radiata region measuring 10 x 4 mm in size. 2. Otherwise there is no change when compared to prior MRI of the brain 05/21/2021. A 67-year-old patient who has very extensive T2 high signal throughout the cerebral white matter compatible with severe small vessel disease, multiple old lacunar infarcts in the thalami bilaterally, posterior right putamen and body of the left caudate nucleus. The patient has a 1.8 x 1 cm old posterior lateral left occipital infarct in the left MCA territory, multiple small subcentimeter old lateral left frontal infarcts in the left MCA territory, a 2.6 x 1.2 cm old superior medial right  cerebellar infarct in the right superior cerebellar artery territory, and tiny 4-6 mm old inferior lateral cerebellar infarcts bilaterally in the PICA territories. The patient has chronic subtotal opacification of the right maxillary sinus with a 3.5 x 2.5 cm mucous retention cyst. The remainder of the MRI of the brain is unremarkable. The results were communicated to Asim Hogue MD, the hospitalist taking care of the patient by telephone 09/17/2022 at 12:20 PM.        Results for orders placed during the hospital encounter of 02/01/22    Adult Transthoracic Echo Complete W/ Cont if Necessary Per Protocol    Interpretation Summary  · There is severe hypokinesis of the mid to distal anteroseptum  · Left ventricular ejection fraction appears to be 46 - 50%.  · Left ventricular wall thickness is consistent with mild concentric hypertrophy.  · Left ventricular diastolic function is consistent with (grade I) impaired relaxation.  · Normal right ventricular cavity size and systolic function noted.  · There is no evidence of pericardial effusion.      Impression/Assessment:  This is a 67-year-old male with past medical history of atrial fibrillation on Eliquis 2.5 mg twice daily, multiple strokes with residual blurred vision bilaterally, acquired hypothyroidism, CKD, CAD, COPD, diabetes, diabetic neuropathy, MI, hypertension, hyperlipidemia who presented to the hospital on 9/15/2022 with complaints of suffering recurrent falls at home and near syncopal episode.  He did hit his head but denied losing consciousness.  He states he has had generalized weakness and poor p.o. intake over the last few days.     BP on arrival 138/72, HR 63.  EKG with NSR, RBBB. Temp 97.8.  .  Creatinine 4.50.  His initial noncontrast CT head was negative for acute findings.  MRI brain complete, final report pending.  Per my independent review it appears to show a right medial temporal lobe and left occipital lobe acute infarct with severe  white matter disease.  Several old scattered bilateral hemispheric infarcts and chronic microhemorrhages also noted.  Reportedly had positive orthostatics with a BP of 91/66 when standing.     Diagnosis:  Acute right MCA and left PCA territory infarcts  Paroxysmal atrial fibrillation  Medical noncompliance  Hyperlipidemia  Type 2 diabetes  SYLVAIN on CKD  Orthostatic hypotension likely secondary to diabetic autonomic neuropathy  Sensory polyneuropathy likely related to diabetes  History of multiple cardioembolic infarcts     Additional work up to date:  Labs: Folate 18.40, hepatitis panel nonreactive, HIV antibodies negative, urine tox negative, ethanol levels negative  MRA head: No significant high-grade stenosis of the head.    Plan:  MRA head looks okay.  Awaiting results to his carotid duplex and 2D echo.  No complaints of orthostasis today.  Eliquis has been increased to 5 mg twice daily and ASA discontinued.  Continue Lipitor 80 mg,   Neurochecks per stroke protocol  Normalize BP  Stroke Education  TREMAYNE/SCDs  PT/OT/ST  I will arrange follow-up with me in 3 months for stroke follow-up.  Therapies as written. CCP for discharge planning. Call RRT for any acute neurological changes.   We will follow peripherally to review carotid duplex and 2D echo.    Case discussed with patient, and Dr. Corbin, and she agrees with plan above.    JI Ayala

## 2022-09-18 NOTE — PROGRESS NOTES
Name: Carlito Mo ADMIT: 9/15/2022   : 1955  PCP: Florencia Caballero MD    MRN: [id number] LOS: 2 days   AGE/SEX: 67 y.o. male  ROOM: [facility]   Subjective   Chief Complaint   Patient presents with   • Fall   • Weakness - Generalized     Multiple falls prior to admission  H/o afib  H/o stroke  Had doppler earlier  Worked with therapists    ROS  No f/c  No n/v  No cp/palp  No soa/cough    Objective   Vital Signs  Temp:  [97.2 °F (36.2 °C)-97.8 °F (36.6 °C)] 97.8 °F (36.6 °C)  Heart Rate:  [57-62] 62  Resp:  [16-20] 16  BP: (124-148)/(66-75) 148/66  SpO2:  [95 %-100 %] 100 %  on   ;   Device (Oxygen Therapy): room air  Body mass index is 22.3 kg/m².    Physical Exam  HENT:      Head: Normocephalic and atraumatic.   Eyes:      General: No scleral icterus.  Cardiovascular:      Rate and Rhythm: Normal rate and regular rhythm.      Heart sounds: Normal heart sounds.   Pulmonary:      Effort: Pulmonary effort is normal. No respiratory distress.      Breath sounds: Normal breath sounds.   Abdominal:      General: There is no distension.      Palpations: Abdomen is soft.      Tenderness: There is no abdominal tenderness.   Musculoskeletal:      Cervical back: Neck supple.   Skin:     Coloration: Skin is pale.   Neurological:      Mental Status: He is alert.   Psychiatric:         Behavior: Behavior normal.     chronically ill    Results Review:       I reviewed the patient's new clinical results.  Results from last 7 days   Lab Units 22  0612 22  0434 09/15/22  2207   WBC 10*3/mm3 5.59 6.35 7.82   HEMOGLOBIN g/dL 9.6* 10.1* 11.2*   PLATELETS 10*3/mm3 216 233 242     Results from last 7 days   Lab Units 22  0612 22  0434 09/15/22  2207   SODIUM mmol/L 138 138 141   POTASSIUM mmol/L 4.4 3.8 4.1   CHLORIDE mmol/L 105 102 101   CO2 mmol/L 24.5 25.9 24.3   BUN mg/dL 60* 73* 75*   CREATININE mg/dL 2.96* 4.19* 4.50*   GLUCOSE mg/dL 117* 228* 103*   Estimated Creatinine Clearance: 25.6 mL/min (A)  (by C-G formula based on SCr of 2.96 mg/dL (H)).  Results from last 7 days   Lab Units 09/15/22  2207   ALBUMIN g/dL 3.90   BILIRUBIN mg/dL 0.3   ALK PHOS U/L 76   AST (SGOT) U/L 26   ALT (SGPT) U/L 30     Results from last 7 days   Lab Units 09/17/22  0612 09/16/22  0434 09/15/22  2207   CALCIUM mg/dL 8.5* 8.8 9.5   ALBUMIN g/dL  --   --  3.90     Results from last 7 days   Lab Units 09/16/22  0434 09/15/22  2207   LACTATE mmol/L 0.9 1.0       Coag     HbA1C   Lab Results   Component Value Date    HGBA1C 8.10 (H) 09/17/2022    HGBA1C 7.30 (H) 07/25/2022    HGBA1C 6.90 (H) 06/05/2022     Infection     Radiology(recent) MRI Angiogram Head Without Contrast    Result Date: 9/17/2022  1. Limited whole brain MRI images are unchanged when compared to MRI of the brain earlier today 09/17/2022 at 9:07 AM. There is tiny 5 mm nodular acute infarct in the anteromedial right temporal lobe anteromedial to the anterior temporal horn of the right lateral ventricle within the distribution of the anterotemporal branch of the right middle cerebral artery territory. There is 5 mm subacute infarct in the left parietal-occipital region. Questionable 3 mm subacute infarct in the right parietal periventricular white matter and these may be in the left PCA territories. There is extensive chronic changes with severe small vessel disease in cerebral white matter. Multiple old lacunar infarcts in the thalami and posterior putamen bilaterally as well as in the mid right corona radiata region. There is a 2 x 1.5 cm old posterior left parietal infarct and subcentimeter old lateral left frontal infarct in the left MCA territory. There is 2.6 x 1.5 cm old superior medial right cerebellar infarct in right superior cerebellar artery territory and a 5 mm old inferior medial left cerebellar infarct in the left PICA territory. 2. MRA of the head is within normal limits with no stenosis seen in the great vessels of the head.      MRI Brain Without  Contrast    Result Date: 9/17/2022  1. Since prior MRI of the brain 05/21/2021 the patient has developed a 5 mm rounded acute infarct in the anterior inferior medial right temporal lobe anterior medial to the anterior tip of the temporal horn of the right lateral ventricle and this is in the distribution of the anterior temporal branch of the right middle cerebral artery territory. There is a 7 x 4 mm subacute infarct in the medial left parietoccipital region likely in the left PCA territory. The patient has also developed a 5 mm focus of hemosiderin deposition from a tiny old microhemorrhage the lateral right occipital lobe, etiology uncertain, could be an old focus of petechial hemorrhage into an old infarct versus secondary to amyloid. Furthermore the patient has developed a chronic lacunar infarct in the mid right corona radiata region measuring 10 x 4 mm in size. 2. Otherwise there is no change when compared to prior MRI of the brain 05/21/2021. A 67-year-old patient who has very extensive T2 high signal throughout the cerebral white matter compatible with severe small vessel disease, multiple old lacunar infarcts in the thalami bilaterally, posterior right putamen and body of the left caudate nucleus. The patient has a 1.8 x 1 cm old posterior lateral left occipital infarct in the left MCA territory, multiple small subcentimeter old lateral left frontal infarcts in the left MCA territory, a 2.6 x 1.2 cm old superior medial right cerebellar infarct in the right superior cerebellar artery territory, and tiny 4-6 mm old inferior lateral cerebellar infarcts bilaterally in the PICA territories. The patient has chronic subtotal opacification of the right maxillary sinus with a 3.5 x 2.5 cm mucous retention cyst. The remainder of the MRI of the brain is unremarkable. The results were communicated to Asim Hogue MD, the hospitalist taking care of the patient by telephone 09/17/2022 at 12:20 PM.        Troponin T    Date Value Ref Range Status   09/15/2022 0.133 (C) 0.000 - 0.030 ng/mL Final     No components found for: TSH;2    amLODIPine, 5 mg, Oral, Q24H  apixaban, 5 mg, Oral, Q12H  vitamin C, 1,000 mg, Oral, Daily  atorvastatin, 80 mg, Oral, Nightly  buPROPion XL, 300 mg, Oral, Daily  carvedilol, 3.125 mg, Oral, BID With Meals  cholecalciferol, 5,000 Units, Oral, Daily  famotidine, 10 mg, Oral, BID  insulin lispro, 0-7 Units, Subcutaneous, TID AC  insulin lispro, 2 Units, Subcutaneous, TID With Meals  levothyroxine, 75 mcg, Oral, Daily  sodium chloride, 10 mL, Intravenous, Q12H  sodium chloride, 10 mL, Intravenous, Q12H  tamsulosin, 0.4 mg, Oral, Daily       Diet Regular; Cardiac, Consistent Carbohydrate, Renal      Assessment & Plan      Active Hospital Problems    Diagnosis  POA   • **Acute ischemic stroke (HCC) [I63.9]  Unknown   • Dehydration [E86.0]  Yes   • SYLVAIN (acute kidney injury) ([facility]) [N17.9]  Yes   • Multiple falls [R29.6]  Not Applicable   • Paroxysmal atrial fibrillation ([facility]) [I48.0]  Yes   • Chronic anticoagulation [Z79.01]  Not Applicable   • CKD (chronic kidney disease) stage 4, GFR 15-29 ml/min (HCC) [N18.4]  Yes   • Chronically elevated troponin [R77.8]  Yes   • Coronary artery disease involving native coronary artery of native heart without angina pectoris [I25.10]  Yes   • YAW (obstructive sleep apnea) [G47.33]  Yes   • Recurrent strokes (HCC) [I63.9]  Yes   • Acquired hypothyroidism [E03.9]  Yes   • Benign prostatic hyperplasia with nocturia [N40.1, R35.1]  Yes   • High blood pressure associated with diabetes (HCC) [E11.59, I15.2]  Yes   • Type 2 diabetes mellitus with stage 5 chronic kidney disease not on chronic dialysis, with long-term current use of insulin (HCC) [E11.22, N18.5, Z79.4]  Not Applicable      Resolved Hospital Problems   No resolved problems to display.         Mr. Mo is a 67 y.o. numerous comorbidities with a recent admission for general weakness that presents with continued  decline, SYLVAIN on CKD and dehydration.        Acute on chronic kidney disease 4/ dehydration  -Creatinine elevated 4.5 with orthostatic positive and poor po intake. Crt decreased with IVF  -Nephrology following   -Jobst stockings at dc     Acute stroke with also chronic strokes  PT OT, CPP   neurology following  He does have a couple of small strokes, may have missed doses of eliquis. Dose now increased to 5mg BID as well. Have stopped ASA. Duplex and echo pending     chronic combined systolic heart failure  - hold lasix, dehydration and SYLVAIN on admission        Type 2 diabetes  -A1c 8.1.  Does not check glucose regularly at home.  Correctional insulin and avoid hypoglycemia. On agents here. Not consistently using insulin at home unfortunately      Paroxysmal atrial fibrillation  -Heart rate controlled  -Continue eliquis     Anemia  -Anemia of chronic disease.  Monitor.       Chronically elevated troponin  -In the setting of CKD and SYLVAIN.  Denies chest pain, troponin at baseline.  No acute ischemic change on EKG.     Hypothyroidism  -continue replacement.          VTE Prophylaxis - Eliquis        staff    Likely discharge to subacute rehab tomorrow    Reviewed records      Asim Hogue MD  [facility]  09/18/22  14:51 EDT

## 2022-09-18 NOTE — PROGRESS NOTES
[facility] Progress Note      Patient Name: Carlito Mo  : 1955  MRN: [id number]  Primary Care Physician:  Florencia Caballero MD  Date of admission: 9/15/2022    Subjective     Interval History:   Follow-up for chronic kidney disease stage IV.  He had 1.8  of urine output charted yesterday 800 mL urine output charted since midnight.  Blood pressure stable overnight.  No labs from today    Review of Systems:   As noted above    Objective     Vitals:   Temp:  [97.2 °F (36.2 °C)-97.8 °F (36.6 °C)] 97.8 °F (36.6 °C)  Heart Rate:  [57-62] 62  Resp:  [16-20] 16  BP: (124-148)/(66-75) 148/66    Intake/Output Summary (Last 24 hours) at 2022 1015  Last data filed at 2022 0900  Gross per 24 hour   Intake --   Output 1450 ml   Net -1450 ml       Physical Exam:    General Appearance: NAD  HEENT: oral mucosa normal, nonicteric sclera  Neck: supple, no JVD  Lungs: CTA  Heart: RRR, normal S1 and S2  Abdomen: soft, nondistended  Extremities: no edema  Neuro: Awake alert and moving all extremities    Scheduled Meds:     amLODIPine, 5 mg, Oral, Q24H  apixaban, 5 mg, Oral, Q12H  vitamin C, 1,000 mg, Oral, Daily  atorvastatin, 80 mg, Oral, Nightly  buPROPion XL, 300 mg, Oral, Daily  carvedilol, 3.125 mg, Oral, BID With Meals  cholecalciferol, 5,000 Units, Oral, Daily  famotidine, 10 mg, Oral, BID  insulin lispro, 0-7 Units, Subcutaneous, TID AC  insulin lispro, 2 Units, Subcutaneous, TID With Meals  levothyroxine, 75 mcg, Oral, Daily  sodium chloride, 10 mL, Intravenous, Q12H  sodium chloride, 10 mL, Intravenous, Q12H  tamsulosin, 0.4 mg, Oral, Daily      IV Meds:        Results Reviewed:   I have personally reviewed the results from the time of this admission to 2022 10:15 EDT     Results from last 7 days   Lab Units 22  0612 22  0434 09/15/22  2207   SODIUM mmol/L 138 138 141   POTASSIUM mmol/L 4.4 3.8 4.1   CHLORIDE mmol/L 105 102 101   CO2 mmol/L 24.5 25.9 24.3   BUN  mg/dL 60* 73* 75*   CREATININE mg/dL 2.96* 4.19* 4.50*   CALCIUM mg/dL 8.5* 8.8 9.5   BILIRUBIN mg/dL  --   --  0.3   ALK PHOS U/L  --   --  76   ALT (SGPT) U/L  --   --  30   AST (SGOT) U/L  --   --  26   GLUCOSE mg/dL 117* 228* 103*     Estimated Creatinine Clearance: 25.6 mL/min (A) (by C-G formula based on SCr of 2.96 mg/dL (H)).      Results from last 7 days   Lab Units 09/17/22  0612   URIC ACID mg/dL 8.1*     Results from last 7 days   Lab Units 09/17/22  0612 09/16/22  0434 09/15/22  2207   WBC 10*3/mm3 5.59 6.35 7.82   HEMOGLOBIN g/dL 9.6* 10.1* 11.2*   PLATELETS 10*3/mm3 216 233 242           Assessment / Plan     ASSESSMENT:  1. CKD stage 4 - Cr is up but slightly better today. Recent baseline creatinine is 3.5-3.9. He has progressive diabetic nephropathy, he might need dialysis initiation at relatively higher GFR in the future if he develops recurrent volume overload  2. Mild hypovolemia  3. HTN - BP adequate, amlodipine and carvedilol can be restarted   4. DM2 on insulin   5. Diastolic CHF on echo 2/3/22, EF 45 to 50%  6. Anemia of CKD, hemoglobin is currently acceptable at 10.1  7. BPH     PLAN:  -No labs from today, ordered renal panel.  -Continue to hold torsemide  -Encourage oral hydration      Ashley Bowles MD  09/18/22  10:15 EDT    [facility]  [phone]

## 2022-09-18 NOTE — PLAN OF CARE
Goal Outcome Evaluation:           Progress: improving   VSS.  A-Ox4.  Pt went down for carotid US and echo today.  Will CTM.

## 2022-09-19 VITALS
HEIGHT: 72 IN | TEMPERATURE: 98.5 F | HEART RATE: 61 BPM | RESPIRATION RATE: 18 BRPM | SYSTOLIC BLOOD PRESSURE: 139 MMHG | DIASTOLIC BLOOD PRESSURE: 67 MMHG | BODY MASS INDEX: 22.21 KG/M2 | WEIGHT: 164 LBS | OXYGEN SATURATION: 99 %

## 2022-09-19 LAB
ALBUMIN SERPL ELPH-MCNC: 3.4 G/DL (ref 2.9–4.4)
ALBUMIN SERPL-MCNC: 3.5 G/DL (ref 3.5–5.2)
ALBUMIN/GLOB SERPL: 1.4 {RATIO} (ref 0.7–1.7)
ALPHA1 GLOB SERPL ELPH-MCNC: 0.2 G/DL (ref 0–0.4)
ALPHA2 GLOB SERPL ELPH-MCNC: 0.6 G/DL (ref 0.4–1)
ANION GAP SERPL CALCULATED.3IONS-SCNC: 9.2 MMOL/L (ref 5–15)
B-GLOBULIN SERPL ELPH-MCNC: 0.8 G/DL (ref 0.7–1.3)
BUN SERPL-MCNC: 56 MG/DL (ref 8–23)
BUN/CREAT SERPL: 19.9 (ref 7–25)
CALCIUM SPEC-SCNC: 8.8 MG/DL (ref 8.6–10.5)
CHLORIDE SERPL-SCNC: 104 MMOL/L (ref 98–107)
CO2 SERPL-SCNC: 23.8 MMOL/L (ref 22–29)
CREAT SERPL-MCNC: 2.81 MG/DL (ref 0.76–1.27)
EGFRCR SERPLBLD CKD-EPI 2021: 23.9 ML/MIN/1.73
GAMMA GLOB SERPL ELPH-MCNC: 0.9 G/DL (ref 0.4–1.8)
GLOBULIN SER-MCNC: 2.5 G/DL (ref 2.2–3.9)
GLUCOSE BLDC GLUCOMTR-MCNC: 132 MG/DL (ref 70–130)
GLUCOSE BLDC GLUCOMTR-MCNC: 134 MG/DL (ref 70–130)
GLUCOSE BLDC GLUCOMTR-MCNC: 170 MG/DL (ref 70–130)
GLUCOSE BLDC GLUCOMTR-MCNC: 178 MG/DL (ref 70–130)
GLUCOSE SERPL-MCNC: 125 MG/DL (ref 65–99)
IGA SERPL-MCNC: 202 MG/DL (ref 61–437)
IGG SERPL-MCNC: 862 MG/DL (ref 603–1613)
IGM SERPL-MCNC: 112 MG/DL (ref 20–172)
INTERPRETATION SERPL IEP-IMP: ABNORMAL
LABORATORY COMMENT REPORT: ABNORMAL
M PROTEIN SERPL ELPH-MCNC: ABNORMAL G/DL
PHOSPHATE SERPL-MCNC: 3.5 MG/DL (ref 2.5–4.5)
POTASSIUM SERPL-SCNC: 4.8 MMOL/L (ref 3.5–5.2)
PROT SERPL-MCNC: 5.9 G/DL (ref 6–8.5)
SODIUM SERPL-SCNC: 137 MMOL/L (ref 136–145)

## 2022-09-19 PROCEDURE — 63710000001 INSULIN LISPRO (HUMAN) PER 5 UNITS: Performed by: HOSPITALIST

## 2022-09-19 PROCEDURE — 63710000001 INSULIN LISPRO (HUMAN) PER 5 UNITS: Performed by: NURSE PRACTITIONER

## 2022-09-19 PROCEDURE — 80069 RENAL FUNCTION PANEL: CPT | Performed by: INTERNAL MEDICINE

## 2022-09-19 PROCEDURE — 99233 SBSQ HOSP IP/OBS HIGH 50: CPT | Performed by: NURSE PRACTITIONER

## 2022-09-19 PROCEDURE — 82962 GLUCOSE BLOOD TEST: CPT

## 2022-09-19 RX ORDER — ACETAMINOPHEN 325 MG/1
650 TABLET ORAL EVERY 4 HOURS PRN
Start: 2022-09-19

## 2022-09-19 RX ORDER — POLYETHYLENE GLYCOL 3350 17 G/17G
17 POWDER, FOR SOLUTION ORAL DAILY PRN
Status: ON HOLD
Start: 2022-09-19 | End: 2023-03-09

## 2022-09-19 RX ORDER — ATORVASTATIN CALCIUM 80 MG/1
80 TABLET, FILM COATED ORAL NIGHTLY
Qty: 90 TABLET | Status: ON HOLD
Start: 2022-09-19 | End: 2023-03-09

## 2022-09-19 RX ORDER — AMLODIPINE BESYLATE 5 MG/1
5 TABLET ORAL
Start: 2022-09-20 | End: 2023-03-10 | Stop reason: HOSPADM

## 2022-09-19 RX ORDER — INSULIN LISPRO 100 [IU]/ML
0-7 INJECTION, SOLUTION INTRAVENOUS; SUBCUTANEOUS
Refills: 12 | Status: ON HOLD
Start: 2022-09-19 | End: 2023-03-09

## 2022-09-19 RX ADMIN — AMLODIPINE BESYLATE 5 MG: 5 TABLET ORAL at 09:49

## 2022-09-19 RX ADMIN — BUPROPION HYDROCHLORIDE 300 MG: 300 TABLET, EXTENDED RELEASE ORAL at 09:47

## 2022-09-19 RX ADMIN — Medication 10 ML: at 09:49

## 2022-09-19 RX ADMIN — INSULIN LISPRO 2 UNITS: 100 INJECTION, SOLUTION INTRAVENOUS; SUBCUTANEOUS at 11:53

## 2022-09-19 RX ADMIN — APIXABAN 5 MG: 5 TABLET, FILM COATED ORAL at 05:09

## 2022-09-19 RX ADMIN — OXYCODONE HYDROCHLORIDE AND ACETAMINOPHEN 1000 MG: 500 TABLET ORAL at 09:47

## 2022-09-19 RX ADMIN — TAMSULOSIN HYDROCHLORIDE 0.4 MG: 0.4 CAPSULE ORAL at 09:47

## 2022-09-19 RX ADMIN — INSULIN LISPRO 2 UNITS: 100 INJECTION, SOLUTION INTRAVENOUS; SUBCUTANEOUS at 11:52

## 2022-09-19 RX ADMIN — FAMOTIDINE 10 MG: 20 TABLET ORAL at 09:47

## 2022-09-19 RX ADMIN — LEVOTHYROXINE SODIUM 75 MCG: 0.07 TABLET ORAL at 09:47

## 2022-09-19 RX ADMIN — Medication 5000 UNITS: at 09:47

## 2022-09-19 RX ADMIN — INSULIN LISPRO 2 UNITS: 100 INJECTION, SOLUTION INTRAVENOUS; SUBCUTANEOUS at 09:47

## 2022-09-19 RX ADMIN — CARVEDILOL 3.12 MG: 3.12 TABLET, FILM COATED ORAL at 09:47

## 2022-09-19 NOTE — PLAN OF CARE
Goal Outcome Evaluation:  Plan of Care Reviewed With: patient         Patients NIH score remains at 0. Neuro checks intact and no change noted. Patient states he is ready for placement because he can't any longer climb steps and he lives in a triplex. Patient still has noted weakness. No complaints voiced and no distress noted. Will continue to monitor, VSS.

## 2022-09-19 NOTE — PROGRESS NOTES
Continued Stay Note  [facility]     Patient Name: Carlito Mo  MRN: [id number]  Today's Date: 9/19/2022    Admit Date: 9/15/2022     Discharge Plan     Row Name 09/19/22 0475       Plan    Plan Comments Spoke with [facility] and [facility] are unable to accept. Follow up with patient at the bedside, he is agreeable with referrals to [facility] and [facility]. Pt requested CCP call spouse to update; M left for spouse/Lissette [phone] awaiting call back. Last therapy eval noted, pt a ambulated 100' cga. [facility] accepted and following, dc home with HH vs SNF, will discuss with spouse.               Discharge Codes    No documentation.               Expected Discharge Date and Time     Expected Discharge Date Expected Discharge Time    Sep 19, 2022             Jody Childs RN

## 2022-09-19 NOTE — CONSULTS
"Diabetes Education  Assessment/Teaching    Patient Name:  Carlito Mo  YOB: 1955  MRN: [id number]  Admit Date:  9/15/2022      Assessment Date:  9/19/2022  Flowsheet Row Most Recent Value   General Information     Referral From: A1c, Database  [A1C 8.1%]   Height 182.9 cm (72.01\")   Height Method Stated   Weight 74.4 kg (164 lb)   Weight Method Bed scale   Pregnancy Assessment    Diabetes History    What type of diabetes do you have? Type 2   Length of Diabetes Diagnosis 10 + years   Current DM knowledge fair   Have you had diabetes education/teaching in the past? no   Do you test your blood sugar at home? yes   Frequency of checks 1-2 times day   How would you rate your diabetes control? good   Education Preferences    What areas of diabetes would you like to learn about? avoiding high blood sugar, medications for diabetes, testing my blood sugar at home   Nutrition Information    Assessment Topics    Healthy Eating - Assessment N/A-unable to assess   Being Active - Assessment N/A- unable to assess   Taking Medication - Assessment Needs education   Monitoring - Assessment Needs education   DM Goals           Flowsheet Row Most Recent Value   DM Education Needs    Meter Has own   Frequency of Testing 2 times a day   Medication Insulin  [Apidra and Basaglar]   Physical Activity None   Physical Activity Frequency Never   Healthy Coping Appropriate   Discharge Plan Home, Follow-up with MD   Motivation Not interested   Teaching Method Discussion   Patient Response Verbalized understanding            Other Comments:  Discussed with pt his diabetes management.He states he takes care of his diabetes and not that interested in talking about it.He does admit he is not taking his insulin regularly.We discussed problem solving how to remember to take his insulin.He states he tests 1-2 times day and he sees his MD only 2 times year.         Electronically signed by:  Maira Snow RN  09/19/22 12:35 EDT  "

## 2022-09-19 NOTE — PLAN OF CARE
Goal Outcome Evaluation:              Outcome Evaluation: Pt being discharged to [facility] today. Wife to transport.

## 2022-09-19 NOTE — DISCHARGE PLACEMENT REQUEST
"Carlito Sanchez (67 y.o. Male)             Date of Birth   1955    Social Security Number       Address   [address] [place] [address]    Home Phone   [phone]    MRN   [id number]       [facility]    Marital Status                               Admission Date   9/15/22    Admission Type   Emergency    Admitting Provider   Delta Marie MD    Attending Provider   Asim Hogue MD    Department, Room/Bed   [address], [address]       Discharge Date       Discharge Disposition   Skilled Nursing Facility (DC - External)    Discharge Destination                               Attending Provider: Asim Hogue MD    Allergies: Prozac [Fluoxetine Hcl]    Isolation: None   Infection: None   Code Status: CPR   Advance Care Planning Activity    Ht: 182.9 cm (72.01\")   Wt: 74.4 kg (164 lb)    Admission Cmt: None   Principal Problem: Acute ischemic stroke (HCC) [I63.9]                 Active Insurance as of 9/15/2022     Primary Coverage     Payor Plan Insurance Group Employer/Plan Group    MEDICARE MEDICARE A & B      Payor Plan Address Payor Plan Phone Number Payor Plan Fax Number Effective Dates    [address] [phone]  5/1/2020 - None Entered    [facility] [id number]       Subscriber Name Subscriber Birth Date Member ID       CARLITO SANCHEZ 1955 [id number]           Secondary Coverage     Payor Plan Insurance Group Employer/Plan Group    [place]      Payor Plan Address Payor Plan Phone Number Payor Plan Fax Number Effective Dates    [address] [phone]  2/1/2022 - None Entered    [facility] [place] [address]       Subscriber Name Subscriber Birth Date Member ID       CARLITO SANCHEZ 1955 [id number]                 Emergency Contacts      (Rel.) Home Phone Work Phone Mobile Phone    Lissette Sanchez (Spouse) [phone] -- [phone]              "

## 2022-09-19 NOTE — DISCHARGE SUMMARY
NAME: Carlito Mo ADMIT: 9/15/2022   : 1955  PCP: Florencia Caballero MD    MRN: [id number] LOS: 3 days   AGE/SEX: 67 y.o. male  ROOM: [facility] [place]     Date of Admission:  9/15/2022  Date of Discharge:  2022    PCP: Florencia Caballero MD    CHIEF COMPLAINT  Fall and Weakness - Generalized      DISCHARGE DIAGNOSIS  Active Hospital Problems    Diagnosis  POA   • **Acute ischemic stroke (HCC) [I63.9]  Unknown   • Dehydration [E86.0]  Yes   • SYLVAIN (acute kidney injury) (HCC) [N17.9]  Yes   • Multiple falls [R29.6]  Not Applicable   • Paroxysmal atrial fibrillation (HCC) [I48.0]  Yes   • Chronic anticoagulation [Z79.01]  Not Applicable   • CKD (chronic kidney disease) stage 4, GFR 15-29 ml/min (HCC) [N18.4]  Yes   • Chronically elevated troponin [R77.8]  Yes   • Coronary artery disease involving native coronary artery of native heart without angina pectoris [I25.10]  Yes   • YAW (obstructive sleep apnea) [G47.33]  Yes   • Recurrent strokes (HCC) [I63.9]  Yes   • Acquired hypothyroidism [E03.9]  Yes   • Benign prostatic hyperplasia with nocturia [N40.1, R35.1]  Yes   • High blood pressure associated with diabetes (HCC) [E11.59, I15.2]  Yes   • Type 2 diabetes mellitus with stage 5 chronic kidney disease not on chronic dialysis, with long-term current use of insulin (HCC) [E11.22, N18.5, Z79.4]  Not Applicable      Resolved Hospital Problems   No resolved problems to display.       SECONDARY DIAGNOSES  Past Medical History:   Diagnosis Date   • Acquired hypothyroidism    • Acute congestive heart failure (HCC)    • Acute respiratory failure with hypoxia (HCC)    • Acute sinusitis    • SYLVAIN (acute kidney injury) (HCC)     on CKD   • Anemia    • Arthritis    • CAD (coronary artery disease)    • Cancer (HCC)     skin   • Chronic combined systolic and diastolic congestive heart failure (HCC)    • Chronic ischemic heart disease    • Chronic kidney disease (CKD)    • CKD (chronic kidney disease)    • COPD (chronic  obstructive pulmonary disease) (HCC)    • Depression    • Diabetes mellitus type 2, uncontrolled, without complications    • Diabetic neuropathy (HCC)    • Disease of thyroid gland    • Elevated cholesterol    • Fatigue    • Frequent PVCs    • Generalized weakness    • GERD (gastroesophageal reflux disease)    • Heart attack (HCC)    • History of coronary artery disease     with remote history of bypass surgery in 2001   • Hyperlipidemia    • Hypertension    • Hypertensive encephalopathy    • Mental status change     Acute   • NO DEVICE/RECALLED    • Pleural effusion    • Pneumonia    • Poor historian    • RBBB (right bundle branch block)    • Stroke (HCC)     POOR VISION   • TIA (transient ischemic attack)    • Type 2 diabetes mellitus (HCC)    • Urinary retention    • Vitamin D deficiency        CONSULTS   Neurology  Nephrology    HOSPITAL COURSE     Mr. Mo is a 67 y.o. numerous comorbidities with a recent admission for general weakness that presents with continued decline, SYLVAIN on CKD and dehydration. He was found to have acute and chronic strokes. may have missed doses of eliquis. Dose now increased to 5mg BID as well from 2.5mg. Have stopped ASA per Neurology recommendations.      Acute on chronic kidney disease 4/ dehydration  -Creatinine elevated 4.5 with orthostatic positive and poor po intake. Crt decreased with IVF. His diuretics are being held for now. He should follow up closely with Nephrology.  He does have issues with orthostasis so is going to be a balance for him as far as not having too tight of blood pressure control.  Can consider Job stockings as an outpatient to try to help with his orthostasis.      DIAGNOSTICS  Duplex Carotid Ultrasound CAR [[id number]] Damian as Reviewed   Order Status: Completed Resulted: 09/18/22 1353    Updated: 09/18/22 1353    Target HR (85%) 130 bpm     Max. Pred. HR (100%) 153 bpm     Left arm /76 mmHg     Right arm BP AVF mmHg     Prox CCA PSV 87.6 cm/sec      Prox CCA EDV 10.6 cm/sec     Dist CCA PSV -73.3 cm/sec     Dist CCA EDV -11.2 cm/sec     Prox ECA PSV -111.8 cm/sec     Prox ICA PSV -70.7 cm/sec     Prox ICA EDV -13.6 cm/sec     Mid ICA PSV -78.6 cm/sec     Mid ICA EDV -16.2 cm/sec     Dist ICA PSV -71.3 cm/sec     Dist ICA EDV -17.7 cm/sec     Vertebral A PSV 60.4 cm/sec     Vertebral A EDV 5.4 cm/sec     ICA/CCA ratio 1.07    Prox SCLA .7 cm/sec     Prox CCA PSV 89.9 cm/sec     Prox CCA EDV 10.8 cm/sec     Dist CCA PSV -76.2 cm/sec     Dist CCA EDV -12.3 cm/sec     Prox ECA PSV -131.8 cm/sec     Prox ICA PSV 90.2 cm/sec     Prox ICA EDV 14.9 cm/sec     Mid ICA PSV -86.7 cm/sec     Mid ICA EDV -14.6 cm/sec     Dist ICA PSV -100.2 cm/sec     Dist ICA EDV -19.6 cm/sec     Vertebral A PSV 55.7 cm/sec     Vertebral A EDV 5.5 cm/sec     ICA/CCA ratio 1.31    Prox SCLA .7 cm/sec    Narrative:     · Right internal carotid artery mild stenosis.   · Left internal carotid artery mild stenosis.        MRI Angiogram Head Without Contrast [[id number]] Damian as Reviewed   Order Status: Completed Collected: 09/17/22 1823    Updated: 09/19/22 0633   Narrative:     MRA OF THE HEAD WITHOUT CONTRAST 09/17/2022       CLINICAL HISTORY: Patient has had 2 recent falls and acute stroke.       TECHNIQUE: Axial flare and diffusion-weighted images were obtained of   the entire head. In addition 3D time-of-flight images were obtained of   the vertebrobasilar system and of the [place] of [place] with maximum   intensity projection reconstructions for an MRA examination.       This is correlated to an MRI of the brain earlier today 09/17/2022 at   9:07 AM. There are no prior MRAs of the head for comparison.       FINDINGS: Limited whole brain images were obtained consisting of axial   FLAIR and diffusion-weighted images. There is extensive patchy and   confluent T2 high signal throughout the cerebral white matter consistent   with moderate to severe small vessel disease. There is 2  x 1.5 cm old   posterior left parietal infarct in left MCA territory. Tiny   subcentimeter old lateral left frontal infarct in the left MCA   territory. There is a 2.6 x 1.5 cm old superior medial right cerebellar   infarct in right superior cerebellar artery territory. There is tiny 5   mm old inferior medial left cerebellar infarct in left PICA territory.   There is 10 x 4 mm old lacunar infarct in the right corona radiata   region. Multiple tiny old lacunar infarcts in the right and left thalami   as well as in the posterior putamen. There is an area of susceptibility   artifact measuring 5 mm in size from hemosiderin deposition from a tiny   old microhemorrhage in the lateral right occipital lobe. There is a 5 mm   diffusion hyperintensities focus in the anteromedial right lobe   anteromedial to the anterior temporal horn of the right lateral   ventricle compatible with tiny acute anteromedial right temporal infarct   in right MCA territory. There is a 5 mm subacute infarct in the medial   left parieto-occipital region. There may be a very tiny 3 mm subacute   infarct in the right parietal periventricular white matter. There is   diffuse cerebral atrophy. The ventricles are mildly prominent in size   felt to be due to central volume loss. I see no mass effect. No midline   shift and no extra-axial fluid collections are identified. There is   subtotal opacification of the right maxillary sinus with 3.5 x 2.5 cm   mucous retention cyst.       Time-of-flight images through the vertebrobasilar system demonstrate   normal appearance of visualized portion distal vertebral arteries from   the C1 ring level to the vertebrobasilar junction. The proximal mid   basilar arteries normal in appearance. The posterior inferior cerebellar   arteries are normal in appearance bilaterally. The upper cervical and   petrous segments of the internal carotid arteries are normal in   appearance. Time-of-flight images through the  vertebrobasilar system   demonstrate a normal appearance mid and distal basilar artery and   basilar tip. There are hypoplastic P1 segments of posterior cerebral   arteries which is normal anatomic variation. The posterior cerebral and   superior cerebellar arteries are normal in appearance. The cavernous and   supracavernous segments of the internal carotid arteries are normal in   appearance. The ophthalmic artery origins and posterior communicating   artery origins are normal in appearance. A1 segments anterior cerebral   arteries anterior communicating artery origin and the A2 branches   anterior cerebral arteries are normal in appearance. The M1 segments in   middle cerebral arteries and middle cerebral artery bifurcations are   within normal limits.       Impression:     1. Limited whole brain MRI images are unchanged when compared to MRI of   the brain earlier today 09/17/2022 at 9:07 AM. There is tiny 5 mm   nodular acute infarct in the anteromedial right temporal lobe   anteromedial to the anterior temporal horn of the right lateral   ventricle within the distribution of the anterotemporal branch of the   right middle cerebral artery territory. There is 5 mm subacute infarct   in the left parietal-occipital region. Questionable 3 mm subacute   infarct in the right parietal periventricular white matter and these may   be in the left PCA territories. There are extensive chronic changes with   severe small vessel disease in cerebral white matter. There are multiple   old lacunar infarcts in the thalami and posterior putamen bilaterally as   well as in the mid right corona radiata region. There is a 2 x 1.5 cm   old posterior left parietal infarct and subcentimeter old lateral left   frontal infarct in the left MCA territory. There is 2.6 x 1.5 cm old   superior medial right cerebellar infarct in right superior cerebellar   artery territory and a 5 mm old inferior medial left cerebellar infarct   in the left  PICA territory.   2. MRA of the head is within normal limits with no stenosis seen in the   great vessels of the head.       This report was finalized on 9/19/2022 6:29 AM by Dr. Alexey Aldana M.D.        MRI Brain Without Contrast [[id number]] Damian as Reviewed   Order Status: Completed Collected: 09/17/22 1257    Updated: 09/19/22 0635   Narrative:     MRI OF THE BRAIN WITHOUT CONTRAST 09/17/2022       CLINICAL HISTORY: Patient had recent fall, is a dialysis patient with a   low GFR of 14, has neurodeficit. Evaluate for stroke.       TECHNIQUE: Axial T1, FLAIR, fat-suppressed T2, axial diffusion and   gradient echo T2 and sagittal T1-weighted images were obtained of the   entire head.       COMPARISON: This is correlated to a noncontrast head CT 09/15/2022 and a   prior MRI of the brain on 05/21/2021.       FINDINGS: There are extensive patchy and confluent areas of T2 high   signal throughout the periventricular and subcortical white matter of   the cerebral hemispheres consistent with severe small vessel disease.   Patient has multifocal areas of encephalomalacia in the brain most   consistent with multifocal old infarcts including a 1.8 x 1 cm old   posterior lateral left parietal infarct in the left MCA territory   unchanged since MRI of the brain 05/21/2021. On the MRI of the brain   05/21/2021 the patient had a cluster of multiple tiny subcentimeter   acute infarcts in the lateral left frontal lobe and now there are tiny   subcentimeter areas of encephalomalacia compatible with chronic infarcts   and these are in the distribution of the lateral frontal branches of the   left middle cerebral artery territory. There is a 2.6 x 1.2 cm old   superior medial right cerebellar infarct in the right superior   cerebellar artery territory, a tiny 4 mm old inferior lateral right   cerebellar infarct in the right PICA territory, and a tiny 6 x 3 mm old   inferior lateral left cerebellar infarct in the left PICA territory.    There are multiple old lacunar infarcts in the left thalamus and   posterior and anterior right thalamus as well as involving the body of   the left caudate nucleus and posterior right putamen, and these were all   present on the prior MRI of the brain. Since 05/21/2021 the patient has   developed a chronic lacunar type infarct in the mid right corona radiata   region measuring 10 x 4 mm in size. The patient has developed a chronic   infarct in the inferior medial left parietal periventricular white   matter measuring 5 x 4 mm in size. Furthermore, there is a discrete 5 mm   rounded diffusion hyperintense focus in the anterior medial right   temporal lobe just anterior medial to the anterior temporal horn of the   right lateral ventricle compatible with a tiny acute infarct and this is   most probably in the right MCA territory. There is an additional more   faint diffusion hyperintense focus within the medial left   parietooccipital region measuring about 7 x 4 mm in size. It is likely a   subacute infarct in the distribution of the medial parietooccipital   branches of the left PCA territory. Furthermore there is a 5 mm rounded   focus of signal loss on the gradient echo T2-weighted images within the   superior lateral right occipital juxtacortical white matter compatible   with an area of hemosiderin deposition from an old microhemorrhage at   this site. This old microhemorrhage is new when compared to prior MRI of   the head 05/21/2022. There is also a 4 mm area of hemosiderin deposition   in the medial left thalamus compatible with an old microhemorrhage at   that 3 mm nodular focus in the subependymal region of the lateral body   of the right lateral ventricle that is new. The remainder of the brain   parenchyma is normal in signal intensity. There is diffuse cerebral   atrophy and the lateral and 3rd ventricles are prominent size felt to be   due to central volume loss or atrophy. I see no mass effect, no  midline   shift and no extra-axial fluid collections are identified. There is   subtotal opacification of the right maxillary sinus with a 3.5 x 2.5 cm   mucous retention cyst. The remainder of the paranasal sinuses, mastoid   air cells and middle ear cavities are clear. Good flow voids are   demonstrated within the cerebral vessels and in the dural venous   sinuses.       Impression:     1. Since prior MRI of the brain 05/21/2021 the patient has developed a 5   mm rounded acute infarct in the anterior inferior medial right temporal   lobe anterior medial to the anterior tip of the temporal horn of the   right lateral ventricle and this is in the distribution of the anterior   temporal branch of the right middle cerebral artery territory. There is   a 7 x 4 mm subacute infarct in the medial left parietoccipital region   likely in the left PCA territory. The patient has also developed a 5 mm   focus of hemosiderin deposition from a tiny old microhemorrhage the   lateral right occipital lobe, etiology uncertain, could be an old focus   of petechial hemorrhage into an old infarct versus secondary to amyloid.   Furthermore, the patient has developed a chronic lacunar infarct in the   mid right corona radiata region measuring 10 x 4 mm in size.   2. Otherwise, there is no change when compared to prior MRI of the brain   05/21/2021. A 67-year-old patient who has very extensive T2 high signal   throughout the cerebral white matter compatible with severe small vessel   disease, multiple old lacunar infarcts in the thalami bilaterally,   posterior right putamen and body of the left caudate nucleus. The   patient has a 1.8 x 1 cm old posterior lateral left occipital infarct in   the left MCA territory, multiple small subcentimeter old lateral left   frontal infarcts in the left MCA territory, a 2.6 x 1.2 cm old superior   medial right cerebellar infarct in the right superior cerebellar artery   territory, and tiny 4-6 mm old  inferior lateral cerebellar infarcts   bilaterally in the PICA territories. The patient has chronic subtotal   opacification of the right maxillary sinus with a 3.5 x 2.5 cm mucous   retention cyst. The remainder of the MRI of the brain is unremarkable.   The results were communicated to Asim Hogue MD, the hospitalist   taking care of the patient by telephone 09/17/2022 at 12:20 PM.       This report was finalized on 9/19/2022 6:32 AM by Dr. Alexey Aldana M.D.        CT Head Without Contrast [[id number]] Damian as Reviewed   Order Status: Completed Collected: 09/16/22 0003    Updated: 09/16/22 0010   Narrative:     CT HEAD WITHOUT CONTRAST       HISTORY: Fall with injury       COMPARISON: The first 2022       TECHNIQUE: Axial CT imaging was obtained through the brain. No IV   contrast was administered.       FINDINGS:   No acute intracranial hemorrhage is seen there is advanced atrophy for   the age of 67. There is periventricular and deep white matter   microangiopathic change. There is no midline shift or mass effect. There   is an old right cerebellar infarct. Additional old infarcts are   suspected within the bilateral corona radiata. No calvarial fracture is   seen. Mucous retention cyst is seen within the right maxillary sinus.   Suspected sebaceous cysts are noted within the scalp.       Impression:     No acute intracranial findings.      09/19/2022 0603 09/19/2022 0655 Renal Function Panel [[id number]]    (Abnormal)   Blood    Final result Component Value Units   Glucose 125 High  mg/dL   BUN 56 High  mg/dL   Creatinine 2.81 High  mg/dL   Sodium 137 mmol/L   Potassium 4.8 mmol/L   Chloride 104 mmol/L   CO2 23.8 mmol/L   Calcium 8.8 mg/dL   Albumin 3.50 g/dL   Phosphorus 3.5 mg/dL   Anion Gap 9.2 mmol/L   BUN/Creatinine Ratio 19.9    eGFR 23.9 Low   mL/min/1.73          09/17/2022 0612 09/17/2022 0728 Vitamin B12 [[id number]]    Blood    Final result Component Value Units   Vitamin B-12 844 pg/mL           09/17/2022 0612 09/17/2022 0728 Folate [[id number]]    Blood    Final result Component Value Units   Folate 18.40 ng/mL          09/17/2022 0612 09/17/2022 1316 Hemoglobin A1c [[id number]]    (Abnormal)   Blood    Final result Component Value Units   Hemoglobin A1C 8.10 High  %          09/17/2022 0612 09/17/2022 1348 Lipid Panel [[id number]]    (Abnormal)   Blood    Final result Component Value Units   Total Cholesterol 165 mg/dL   Triglycerides 98 mg/dL   HDL Cholesterol 42 mg/dL   LDL Cholesterol  105 High  mg/dL   VLDL Cholesterol 18 mg/dL   LDL/HDL Ratio 2.46             PHYSICAL EXAM  Objective    Alert  nad  No resp distress  Chronically ill    CONDITION ON DISCHARGE  Fair      DISCHARGE DISPOSITION   Skilled Nursing Facility (DC - External)      DISCHARGE MEDICATIONS       Your medication list      START taking these medications      Instructions Last Dose Given Next Dose Due   acetaminophen 325 MG tablet  Commonly known as: TYLENOL      Take 2 tablets by mouth Every 4 (Four) Hours As Needed for Mild Pain.       insulin lispro 100 UNIT/ML injection  Commonly known as: ADMELOG      Inject 0-7 Units under the skin into the appropriate area as directed 3 (Three) Times a Day Before Meals.       polyethylene glycol 17 g packet  Commonly known as: MIRALAX      Take 17 g by mouth Daily As Needed (constipation).          CHANGE how you take these medications      Instructions Last Dose Given Next Dose Due   amLODIPine 5 MG tablet  Commonly known as: NORVASC  Start taking on: September 20, 2022  What changed:   · medication strength  · how much to take  · when to take this      Take 1 tablet by mouth Daily.       Apidra 100 UNIT/ML injection  Generic drug: insulin glulisine  What changed: additional instructions      Inject 2 Units under the skin into the appropriate area as directed 3 (Three) Times a Day Before Meals. Patient says he does not take consistently       apixaban 5 MG tablet tablet  Commonly known as:  JED  What changed:   · medication strength  · how much to take      Take 1 tablet by mouth Every 12 (Twelve) Hours. Indications: Atrial Fibrillation       atorvastatin 80 MG tablet  Commonly known as: LIPITOR  What changed:   · medication strength  · how much to take  · when to take this      Take 1 tablet by mouth Every Night.       BASAGLAR KWIKPEN 100 UNIT/ML injection pen  What changed:   · additional instructions  · Another medication with the same name was removed. Continue taking this medication, and follow the directions you see here.      Inject 4 Units under the skin into the appropriate area as directed Every Night. Patient says he does not take consistently          CONTINUE taking these medications      Instructions Last Dose Given Next Dose Due   buPROPion  MG 24 hr tablet  Commonly known as: WELLBUTRIN XL      Take 300 mg by mouth Daily.       carvedilol 3.125 MG tablet  Commonly known as: COREG      Take 3.125 mg by mouth 2 (Two) Times a Day With Meals.       famotidine 10 MG tablet  Commonly known as: PEPCID      Take 10 mg by mouth 2 (Two) Times a Day.       levothyroxine 75 MCG tablet  Commonly known as: SYNTHROID, LEVOTHROID      Take 1 tablet by mouth Daily.       tamsulosin 0.4 MG capsule 24 hr capsule  Commonly known as: FLOMAX      Take 1 capsule by mouth Every Night.       vitamin C 250 MG tablet  Commonly known as: ASCORBIC ACID      Take 1,000 mg by mouth Daily.       vitamin D3 125 MCG (5000 UT) capsule capsule      Take 5,000 Units by mouth Daily.          STOP taking these medications    aspirin 81 MG EC tablet        torsemide 100 MG tablet  Commonly known as: DEMADEX              Where to Get Your Medications      Information about where to get these medications is not yet available    Ask your nurse or doctor about these medications  · acetaminophen 325 MG tablet  · amLODIPine 5 MG tablet  · apixaban 5 MG tablet tablet  · atorvastatin 80 MG tablet  · insulin lispro 100  UNIT/ML injection  · polyethylene glycol 17 g packet          Future Appointments   Date Time Provider Department Center   10/5/2022  2:00 PM Alisia Paula APRN [facility]     Additional Instructions for the Follow-ups that You Need to Schedule     Discharge Follow-up with PCP   As directed       Currently Documented PCP:    Florencia Caballero MD    PCP Phone Number:    [phone]     Follow Up Details: after dc from rehab         Discharge Follow-up with Specialty: Nephrology in 2 weeks   As directed      Specialty: Nephrology in 2 weeks         Discharge Follow-up with Specialty: Neurology-Alisia WORKMAN in the office, can follow-up with her in 3 months   As directed      Specialty: Neurology-Alisia WORKMAN in the office, can follow-up with her in 3 months            Contact information for follow-up providers     Florencia Caballero MD .    Specialty: Internal Medicine  Why: after dc from rehab  Contact information:  [address] [facility] [id number] [phone]                   Contact information for after-discharge care     Destination     [facility] .    Service: Skilled Nursing  Contact information:  [address] [facility] [address] [phone]                 Home Medical Care     [facility] .    Service: Home Health Services  Contact information:  [address]  [facility] [address] [phone]                             TEST  RESULTS PENDING AT DISCHARGE  Pending Labs     Order Current Status    Immunofixation, Urine - Urine, Clean Catch In process             Asim Hogue MD  [facility]  09/19/22  15:13 EDT      Time: greater than 32 minutes on discharge  It was a pleasure taking care of this patient while in the hospital.

## 2022-09-19 NOTE — DISCHARGE PLACEMENT REQUEST
"Carlito Sanchez (67 y.o. Male)             Date of Birth   1955    Social Security Number       Address   [address] [place] [address]    Home Phone   [phone]    MRN   [id number]       [facility]    Marital Status                               Admission Date   9/15/22    Admission Type   Emergency    Admitting Provider   Delta Marie MD    Attending Provider   Asim Hogue MD    Department, Room/Bed   [address], [address]/1       Discharge Date       Discharge Disposition   Skilled Nursing Facility (DC - External)    Discharge Destination                               Attending Provider: Asim Hogue MD    Allergies: Prozac [Fluoxetine Hcl]    Isolation: None   Infection: None   Code Status: CPR   Advance Care Planning Activity    Ht: 182.9 cm (72.01\")   Wt: 74.4 kg (164 lb)    Admission Cmt: None   Principal Problem: Acute ischemic stroke (HCC) [I63.9]                 Active Insurance as of 9/15/2022     Primary Coverage     Payor Plan Insurance Group Employer/Plan Group    MEDICARE MEDICARE A & B      Payor Plan Address Payor Plan Phone Number Payor Plan Fax Number Effective Dates    [address] [phone]  5/1/2020 - None Entered    [facility] [id number]       Subscriber Name Subscriber Birth Date Member ID       CARLITO SANCHEZ 1955 [id number]           Secondary Coverage     Payor Plan Insurance Group Employer/Plan Group    [place]      Payor Plan Address Payor Plan Phone Number Payor Plan Fax Number Effective Dates    [address] [phone]  2/1/2022 - None Entered    [facility] [place] [address]       Subscriber Name Subscriber Birth Date Member ID       CARLITO SANCHEZ 1955 [id number]                 Emergency Contacts      (Rel.) Home Phone Work Phone Mobile Phone    Lissette Sanchez (Spouse) [phone] -- [phone]              "

## 2022-09-19 NOTE — PROGRESS NOTES
"DOS: 2022  NAME: Carlito Mo   : 1955  PCP: Florencia Caballero MD  Chief Complaint   Patient presents with   • Fall   • Weakness - Generalized     Stroke    Subjective: Continues to note slurred speech and balance issues. No vision change or unilateral weakness/numbness. Pt seen in follow up today, however the problem is new to the examiner.      Objective:  Vital signs: /77 (BP Location: Right arm, Patient Position: Lying)   Pulse 62   Temp 98.4 °F (36.9 °C) (Oral)   Resp 16   Ht 182.9 cm (72.01\")   Wt 74.4 kg (164 lb)   SpO2 99%   BMI 22.24 kg/m²       General appearance: Well developed, well nourished, alert and cooperative.   HEENT: Normocephalic.   Cardiac: Regular rate and rhythm.   Peripheral Vasculature: Radial pulses are equal and symmetric.  Chest Exam: Clear to auscultation bilaterally, no wheezes, no rhonchi.  Extremities: Normal, no edema.   Skin: No rashes or birthmarks.     Higher integrative function: Oriented to time, place, person, intact recent and remote memory, attention span, concentration and language. Spontaneous speech, fund of vocabulary are normal.   Cranial nerves: Visual fields intact, extraocular movements full, pupils are equal, round, react to light.  Facial sensation normal, face symmetric.  Hearing intact.  Symmetric palatal movement.  Shoulder shrug symmetric.  Tongue midline.  Mild dysarthria.  Motor: Normal muscle strength, bulk, and tone in upper and lower extremities. No fasciculations, rigidity, spasticity or abnormal movements.   Sensation: Intact/symmetric to light touch in arms and legs.  Station and gait: Deferred.  Coordination: Finger to nose test showed no dysmetria.     Scheduled Meds:amLODIPine, 5 mg, Oral, Q24H  apixaban, 5 mg, Oral, Q12H  vitamin C, 1,000 mg, Oral, Daily  atorvastatin, 80 mg, Oral, Nightly  buPROPion XL, 300 mg, Oral, Daily  carvedilol, 3.125 mg, Oral, BID With Meals  cholecalciferol, 5,000 Units, Oral, Daily  famotidine, " 10 mg, Oral, BID  insulin lispro, 0-7 Units, Subcutaneous, TID AC  insulin lispro, 2 Units, Subcutaneous, TID With Meals  levothyroxine, 75 mcg, Oral, Daily  sodium chloride, 10 mL, Intravenous, Q12H  sodium chloride, 10 mL, Intravenous, Q12H  tamsulosin, 0.4 mg, Oral, Daily      Continuous Infusions:   PRN Meds:.•  acetaminophen **OR** acetaminophen **OR** acetaminophen  •  dextrose  •  dextrose  •  glucagon (human recombinant)  •  ondansetron  •  polyethylene glycol  •  [COMPLETED] Insert peripheral IV **AND** sodium chloride  •  sodium chloride  •  sodium chloride    Laboratory results:  Lab Results   Component Value Date    GLUCOSE 125 (H) 09/19/2022    CALCIUM 8.8 09/19/2022     09/19/2022    K 4.8 09/19/2022    CO2 23.8 09/19/2022     09/19/2022    BUN 56 (H) 09/19/2022    CREATININE 2.81 (H) 09/19/2022    EGFRIFAFRI 74 12/12/2017    EGFRIFNONA 19 (L) 02/04/2022    BCR 19.9 09/19/2022    ANIONGAP 9.2 09/19/2022     Lab Results   Component Value Date    WBC 5.59 09/17/2022    HGB 9.6 (L) 09/17/2022    HCT 30.5 (L) 09/17/2022    MCV 82.0 09/17/2022     09/17/2022     Lab Results   Component Value Date    CHOL 165 09/17/2022    CHOL 229 (H) 06/05/2022    CHOL 187 03/02/2022     Lab Results   Component Value Date    HDL 42 09/17/2022    HDL 42 06/05/2022    HDL 47 03/02/2022     Lab Results   Component Value Date     (H) 09/17/2022     (H) 06/05/2022     (H) 03/02/2022     Lab Results   Component Value Date    TRIG 98 09/17/2022    TRIG 83 06/05/2022    TRIG 59 03/02/2022         Lab 09/17/22  0612   HEMOGLOBIN A1C 8.10*      Review and interpretation of imaging: MR brain images viewed by me, showed right temporal lobe and left PCA small infarcts.  Also noted are a few scattered tiny chronic microhemorrhages on GRE sequence.  Adult Transthoracic Echo Complete W/ Cont if Necessary Per Protocol (With Agitated Saline)    Result Date: 9/18/2022  · Calculated left ventricular 3D  EF = 56% Left ventricular systolic function is normal. · Saline test results are negative. · Left ventricular diastolic function was normal. · Left atrial volume is moderately increased. · Estimated right ventricular systolic pressure from tricuspid regurgitation is normal (<35 mmHg).      CT Head Without Contrast    Result Date: 9/16/2022  CT HEAD WITHOUT CONTRAST  HISTORY: Fall with injury  COMPARISON: The first 2022  TECHNIQUE: Axial CT imaging was obtained through the brain. No IV contrast was administered.  FINDINGS: No acute intracranial hemorrhage is seen there is advanced atrophy for the age of 67. There is periventricular and deep white matter microangiopathic change. There is no midline shift or mass effect. There is an old right cerebellar infarct. Additional old infarcts are suspected within the bilateral corona radiata. No calvarial fracture is seen. Mucous retention cyst is seen within the right maxillary sinus. Suspected sebaceous cysts are noted within the scalp.      No acute intracranial findings.  Radiation dose reduction techniques were utilized, including automated exposure control and exposure modulation based on body size.  This report was finalized on 9/16/2022 12:07 AM by Dr. Alice Allen M.D.      MRI Angiogram Head Without Contrast    Result Date: 9/19/2022  MRA OF THE HEAD WITHOUT CONTRAST 09/17/2022  CLINICAL HISTORY: Patient has had 2 recent falls and acute stroke.  TECHNIQUE: Axial flare and diffusion-weighted images were obtained of the entire head. In addition 3D time-of-flight images were obtained of the vertebrobasilar system and of the [place] with maximum intensity projection reconstructions for an MRA examination.  This is correlated to an MRI of the brain earlier today 09/17/2022 at 9:07 AM. There are no prior MRAs of the head for comparison.  FINDINGS: Limited whole brain images were obtained consisting of axial FLAIR and diffusion-weighted images. There is  extensive patchy and confluent T2 high signal throughout the cerebral white matter consistent with moderate to severe small vessel disease. There is 2 x 1.5 cm old posterior left parietal infarct in left MCA territory. Tiny subcentimeter old lateral left frontal infarct in the left MCA territory. There is a 2.6 x 1.5 cm old superior medial right cerebellar infarct in right superior cerebellar artery territory. There is tiny 5 mm old inferior medial left cerebellar infarct in left PICA territory. There is 10 x 4 mm old lacunar infarct in the right corona radiata region. Multiple tiny old lacunar infarcts in the right and left thalami as well as in the posterior putamen. There is an area of susceptibility artifact measuring 5 mm in size from hemosiderin deposition from a tiny old microhemorrhage in the lateral right occipital lobe. There is a 5 mm diffusion hyperintensities focus in the anteromedial right lobe anteromedial to the anterior temporal horn of the right lateral ventricle compatible with tiny acute anteromedial right temporal infarct in right MCA territory. There is a 5 mm subacute infarct in the medial left parieto-occipital region. There may be a very tiny 3 mm subacute infarct in the right parietal periventricular white matter. There is diffuse cerebral atrophy. The ventricles are mildly prominent in size felt to be due to central volume loss. I see no mass effect. No midline shift and no extra-axial fluid collections are identified. There is subtotal opacification of the right maxillary sinus with 3.5 x 2.5 cm mucous retention cyst.  Time-of-flight images through the vertebrobasilar system demonstrate normal appearance of visualized portion distal vertebral arteries from the C1 ring level to the vertebrobasilar junction. The proximal mid basilar arteries normal in appearance. The posterior inferior cerebellar arteries are normal in appearance bilaterally. The upper cervical and petrous segments of the  internal carotid arteries are normal in appearance. Time-of-flight images through the vertebrobasilar system demonstrate a normal appearance mid and distal basilar artery and basilar tip. There are hypoplastic P1 segments of posterior cerebral arteries which is normal anatomic variation. The posterior cerebral and superior cerebellar arteries are normal in appearance. The cavernous and supracavernous segments of the internal carotid arteries are normal in appearance. The ophthalmic artery origins and posterior communicating artery origins are normal in appearance. A1 segments anterior cerebral arteries anterior communicating artery origin and the A2 branches anterior cerebral arteries are normal in appearance. The M1 segments in middle cerebral arteries and middle cerebral artery bifurcations are within normal limits.      1. Limited whole brain MRI images are unchanged when compared to MRI of the brain earlier today 09/17/2022 at 9:07 AM. There is tiny 5 mm nodular acute infarct in the anteromedial right temporal lobe anteromedial to the anterior temporal horn of the right lateral ventricle within the distribution of the anterotemporal branch of the right middle cerebral artery territory. There is 5 mm subacute infarct in the left parietal-occipital region. Questionable 3 mm subacute infarct in the right parietal periventricular white matter and these may be in the left PCA territories. There are extensive chronic changes with severe small vessel disease in cerebral white matter. There are multiple old lacunar infarcts in the thalami and posterior putamen bilaterally as well as in the mid right corona radiata region. There is a 2 x 1.5 cm old posterior left parietal infarct and subcentimeter old lateral left frontal infarct in the left MCA territory. There is 2.6 x 1.5 cm old superior medial right cerebellar infarct in right superior cerebellar artery territory and a 5 mm old inferior medial left cerebellar infarct  in the left PICA territory. 2. MRA of the head is within normal limits with no stenosis seen in the great vessels of the head.  This report was finalized on 9/19/2022 6:29 AM by Dr. Alexey Aldana M.D.      MRI Brain Without Contrast    Result Date: 9/19/2022  MRI OF THE BRAIN WITHOUT CONTRAST 09/17/2022  CLINICAL HISTORY: Patient had recent fall, is a dialysis patient with a low GFR of 14, has neurodeficit. Evaluate for stroke.  TECHNIQUE: Axial T1, FLAIR, fat-suppressed T2, axial diffusion and gradient echo T2 and sagittal T1-weighted images were obtained of the entire head.  COMPARISON: This is correlated to a noncontrast head CT 09/15/2022 and a prior MRI of the brain on 05/21/2021.  FINDINGS: There are extensive patchy and confluent areas of T2 high signal throughout the periventricular and subcortical white matter of the cerebral hemispheres consistent with severe small vessel disease. Patient has multifocal areas of encephalomalacia in the brain most consistent with multifocal old infarcts including a 1.8 x 1 cm old posterior lateral left parietal infarct in the left MCA territory unchanged since MRI of the brain 05/21/2021. On the MRI of the brain 05/21/2021 the patient had a cluster of multiple tiny subcentimeter acute infarcts in the lateral left frontal lobe and now there are tiny subcentimeter areas of encephalomalacia compatible with chronic infarcts and these are in the distribution of the lateral frontal branches of the left middle cerebral artery territory. There is a 2.6 x 1.2 cm old superior medial right cerebellar infarct in the right superior cerebellar artery territory, a tiny 4 mm old inferior lateral right cerebellar infarct in the right PICA territory, and a tiny 6 x 3 mm old inferior lateral left cerebellar infarct in the left PICA territory. There are multiple old lacunar infarcts in the left thalamus and posterior and anterior right thalamus as well as involving the body of the left caudate  nucleus and posterior right putamen, and these were all present on the prior MRI of the brain. Since 05/21/2021 the patient has developed a chronic lacunar type infarct in the mid right corona radiata region measuring 10 x 4 mm in size. The patient has developed a chronic infarct in the inferior medial left parietal periventricular white matter measuring 5 x 4 mm in size. Furthermore, there is a discrete 5 mm rounded diffusion hyperintense focus in the anterior medial right temporal lobe just anterior medial to the anterior temporal horn of the right lateral ventricle compatible with a tiny acute infarct and this is most probably in the right MCA territory. There is an additional more faint diffusion hyperintense focus within the medial left parietooccipital region measuring about 7 x 4 mm in size. It is likely a subacute infarct in the distribution of the medial parietooccipital branches of the left PCA territory. Furthermore there is a 5 mm rounded focus of signal loss on the gradient echo T2-weighted images within the superior lateral right occipital juxtacortical white matter compatible with an area of hemosiderin deposition from an old microhemorrhage at this site. This old microhemorrhage is new when compared to prior MRI of the head 05/21/2022. There is also a 4 mm area of hemosiderin deposition in the medial left thalamus compatible with an old microhemorrhage at that 3 mm nodular focus in the subependymal region of the lateral body of the right lateral ventricle that is new. The remainder of the brain parenchyma is normal in signal intensity. There is diffuse cerebral atrophy and the lateral and 3rd ventricles are prominent size felt to be due to central volume loss or atrophy. I see no mass effect, no midline shift and no extra-axial fluid collections are identified. There is subtotal opacification of the right maxillary sinus with a 3.5 x 2.5 cm mucous retention cyst. The remainder of the paranasal  sinuses, mastoid air cells and middle ear cavities are clear. Good flow voids are demonstrated within the cerebral vessels and in the dural venous sinuses.      1. Since prior MRI of the brain 05/21/2021 the patient has developed a 5 mm rounded acute infarct in the anterior inferior medial right temporal lobe anterior medial to the anterior tip of the temporal horn of the right lateral ventricle and this is in the distribution of the anterior temporal branch of the right middle cerebral artery territory. There is a 7 x 4 mm subacute infarct in the medial left parietoccipital region likely in the left PCA territory. The patient has also developed a 5 mm focus of hemosiderin deposition from a tiny old microhemorrhage the lateral right occipital lobe, etiology uncertain, could be an old focus of petechial hemorrhage into an old infarct versus secondary to amyloid. Furthermore, the patient has developed a chronic lacunar infarct in the mid right corona radiata region measuring 10 x 4 mm in size. 2. Otherwise, there is no change when compared to prior MRI of the brain 05/21/2021. A 67-year-old patient who has very extensive T2 high signal throughout the cerebral white matter compatible with severe small vessel disease, multiple old lacunar infarcts in the thalami bilaterally, posterior right putamen and body of the left caudate nucleus. The patient has a 1.8 x 1 cm old posterior lateral left occipital infarct in the left MCA territory, multiple small subcentimeter old lateral left frontal infarcts in the left MCA territory, a 2.6 x 1.2 cm old superior medial right cerebellar infarct in the right superior cerebellar artery territory, and tiny 4-6 mm old inferior lateral cerebellar infarcts bilaterally in the PICA territories. The patient has chronic subtotal opacification of the right maxillary sinus with a 3.5 x 2.5 cm mucous retention cyst. The remainder of the MRI of the brain is unremarkable. The results were  communicated to Asim Hogue MD, the hospitalist taking care of the patient by telephone 09/17/2022 at 12:20 PM.  This report was finalized on 9/19/2022 6:32 AM by Dr. Alexey Aldana M.D.      Duplex Carotid Ultrasound CAR    Result Date: 9/18/2022  · Right internal carotid artery mild stenosis. · Left internal carotid artery mild stenosis.        Impression:  67-year-old male with HTN, HLD, diabetes with associated neuropathy, YAW, CAD, CKD, COPD, hypothyroidism, prior strokes with residual blurred vision bilaterally, and atrial fibrillation on Eliquis 2.5 mg twice daily who presented 9/15 with complaints of recurrent falls at home and near syncopal episode.  He reports generalized weakness and poor p.o. intake over the last few days.  On arrival BP was 138/72, ECG showed NSR.  Creatinine 4.50.  Also reportedly had positive orthostatics with a BP of 91/66 on standing.  On exam he had evidence of sensory polyneuropathy which is felt likely related to diabetes.     Work-up:  MRI brain without: Acute infarct in the right temporal lobe and the right MCA distribution, subacute infarct in the left parieto-occipital lobe and the left PCA distribution.  Tiny old microhemorrhage noted in the right occipital lobe.  Chronic lacunar infarct in the right corona radiata.,  Old lacunar disease also noted as well as extensive severe small vessel disease.  MRA head: Unremarkable  Carotid ultrasound: Mild stenosis in bilateral ICAs, 16 to 49%.  2D echo: EF 56%,Left atrial cavity moderately dilated, saline test results negative, no valvular source of stroke.  Labs: B12 844, folate 18.4, hemoglobin A1c 8.10%, , HIV nonreactive, TSH 1.61.  Electrophoresis/immunofixation pending.    Diagnoses:  Bilateral strokes, likely cardioembolic related to atrial fibrillation  Sensory polyneuropathy, likely related to diabetes  Orthostatic hypotension likely related to diabetic autonomic neuropathy  SYLVAIN/CKD  Atrial fibrillation  Uncontrolled  diabetes    Plan:  Eliquis increased to 5 mg twice daily.  Antiplatelet not needed.  Lipitor increased to 80 mg daily.  Neurochecks  BP control-avoid hypotension/dehydration, midodrine can be considered.  Jobst stockings to be placed on discharge.  Needs better control of diabetes.  Stroke Education  TREMAYNE/SCDs  PT/OT/ST  Patient has previously seen Alisia WORKMAN in the office, can follow-up with her in 3 months.  Discussed with Dr. Trujillo on today.  Neurology will sign off but please call if further questions or concerns.

## 2022-09-19 NOTE — PROGRESS NOTES
Continued Stay Note  [facility]     Patient Name: Carlito Mo  MRN: [id number]  Today's Date: 9/19/2022    Admit Date: 9/15/2022     Discharge Plan     Row Name 09/19/22 1446       Plan    Plan [facility], bed ready today    Plan Comments S/w [facility], bed available today at [facility]. Several vmm left for spouse/Sofiya; awaiting call back to confirm dc plan. Spouse to transport. Transfer packet in [employer]. [facility]'s pharmacy is selected in [place].    Row Name 09/19/22 1217       Plan    Plan Comments Spoke with Terry [facility] and Priscilla are unable to accept. Follow up with patient at the bedside, he is agreeable with referrals to [facility] and [facility]. Pt requested CCP call spouse to update; VMM left for spouse/Lissette [phone] awaiting call back. Last therapy eval noted, pt a ambulated 100' cga. [facility] accepted and following, dc home with HH vs SNF, will discuss with spouse.               Discharge Codes    No documentation.               Expected Discharge Date and Time     Expected Discharge Date Expected Discharge Time    Sep 19, 2022             Jody Childs, DEMAR

## 2022-09-19 NOTE — PROGRESS NOTES
Case Management Discharge Note      Final Note: Spoke with spouse, she is agreeable with dc to [facility] today ad she will transport. Bed is ready per [facility]. Transfer packet is in [employer]. [facility] is selected in [place].         Selected Continued Care - Admitted Since 9/15/2022     Destination Coordination complete.    Service Provider Selected Services Address Phone Fax Patient Preferred    [facility]  Skilled Nursing [address] [facility] [place] [address] [phone] --          Durable Medical Equipment    No services have been selected for the patient.              Dialysis/Infusion    No services have been selected for the patient.              Home Medical Care Coordination complete.    Service Provider Selected Services Address Phone Fax Patient Preferred    [facility]  Home Health Services [address] [place] [address] [phone] --          Therapy    No services have been selected for the patient.              Community Resources    No services have been selected for the patient.              Community & DME    No services have been selected for the patient.                       Final Discharge Disposition Code: 03 - skilled nursing facility (SNF)

## 2022-09-19 NOTE — PROGRESS NOTES
[facility] Progress Note      Patient Name: Carlito Mo  : 1955  MRN: [id number]  Primary Care Physician:  Florencia Caballero MD  Date of admission: 9/15/2022    Subjective     Interval History:   Follow-up for chronic kidney disease stage IV    He has good urine output  He still feels tired  He does not have dyspnea  His kidney function is better  Blood pressure is acceptable    Review of Systems:   As noted above    Objective     Vitals:   Temp:  [97.3 °F (36.3 °C)-98.4 °F (36.9 °C)] 98.4 °F (36.9 °C)  Heart Rate:  [62-71] 62  Resp:  [16-20] 16  BP: (121-162)/(71-77) 151/77    Intake/Output Summary (Last 24 hours) at 2022 0955  Last data filed at 2022 0500  Gross per 24 hour   Intake 600 ml   Output 1950 ml   Net -1350 ml       Physical Exam:    General Appearance: NAD  HEENT: oral mucosa normal, nonicteric sclera  Neck: supple, no JVD  Lungs: CTA  Heart: RRR, normal S1 and S2  Abdomen: soft, nondistended  Extremities: no edema  Neuro: Awake alert and moving all extremities    Scheduled Meds:     amLODIPine, 5 mg, Oral, Q24H  apixaban, 5 mg, Oral, Q12H  vitamin C, 1,000 mg, Oral, Daily  atorvastatin, 80 mg, Oral, Nightly  buPROPion XL, 300 mg, Oral, Daily  carvedilol, 3.125 mg, Oral, BID With Meals  cholecalciferol, 5,000 Units, Oral, Daily  famotidine, 10 mg, Oral, BID  insulin lispro, 0-7 Units, Subcutaneous, TID AC  insulin lispro, 2 Units, Subcutaneous, TID With Meals  levothyroxine, 75 mcg, Oral, Daily  sodium chloride, 10 mL, Intravenous, Q12H  sodium chloride, 10 mL, Intravenous, Q12H  tamsulosin, 0.4 mg, Oral, Daily      IV Meds:        Results Reviewed:   I have personally reviewed the results from the time of this admission to 2022 09:55 EDT     Results from last 7 days   Lab Units 22  0603 22  1200 22  0612 22  0434 09/15/22  2207   SODIUM mmol/L 137 136 138   < > 141   POTASSIUM mmol/L 4.8 4.8 4.4   < > 4.1   CHLORIDE mmol/L  104 104 105   < > 101   CO2 mmol/L 23.8 25.0 24.5   < > 24.3   BUN mg/dL 56* 54* 60*   < > 75*   CREATININE mg/dL 2.81* 2.76* 2.96*   < > 4.50*   CALCIUM mg/dL 8.8 8.6 8.5*   < > 9.5   BILIRUBIN mg/dL  --   --   --   --  0.3   ALK PHOS U/L  --   --   --   --  76   ALT (SGPT) U/L  --   --   --   --  30   AST (SGOT) U/L  --   --   --   --  26   GLUCOSE mg/dL 125* 169* 117*   < > 103*    < > = values in this interval not displayed.     Estimated Creatinine Clearance: 26.8 mL/min (A) (by C-G formula based on SCr of 2.81 mg/dL (H)).  Results from last 7 days   Lab Units 09/19/22  0603 09/18/22  1200   PHOSPHORUS mg/dL 3.5 3.3     Results from last 7 days   Lab Units 09/17/22  0612   URIC ACID mg/dL 8.1*     Results from last 7 days   Lab Units 09/17/22  0612 09/16/22  0434 09/15/22  2207   WBC 10*3/mm3 5.59 6.35 7.82   HEMOGLOBIN g/dL 9.6* 10.1* 11.2*   PLATELETS 10*3/mm3 216 233 242           Assessment / Plan     ASSESSMENT:  1. CKD stage 4 - Cr is better today. Recent baseline creatinine is 3.5-3.9. He has progressive diabetic nephropathy, he might need dialysis initiation at relatively higher GFR in the future if he develops recurrent volume overload  2. Mild hypovolemia  3. HTN - BP adequate, amlodipine and carvedilol can be restarted   4. DM2 on insulin   5. Diastolic CHF on echo 2/3/22, EF 45 to 50%  6. Anemia of CKD, hemoglobin is currently acceptable at 10.1  7. BPH     PLAN:  -Monitor labs  -Continue to hold torsemide.  This can be restarted as outpatient  -Encourage oral hydration  -Monitor hemoglobin  -Keep the same dose of carvedilol and amlodipine    I will continue to follow the patient in the hospital.  Potentially can be discharged from the renal standpoint.  He needs follow-up in the clinic in about 4 weeks.    Ilir Briones MD  09/19/22  09:55 EDT    [facility]  [phone]

## 2022-09-21 LAB — INTERPRETATION UR IFE-IMP: NORMAL

## 2022-10-05 ENCOUNTER — OFFICE VISIT (OUTPATIENT)
Dept: NEUROLOGY | Facility: CLINIC | Age: 67
End: 2022-10-05

## 2022-10-05 VITALS
HEART RATE: 69 BPM | SYSTOLIC BLOOD PRESSURE: 126 MMHG | OXYGEN SATURATION: 96 % | HEIGHT: 72 IN | BODY MASS INDEX: 21.26 KG/M2 | WEIGHT: 157 LBS | DIASTOLIC BLOOD PRESSURE: 80 MMHG

## 2022-10-05 DIAGNOSIS — I67.9 CEREBROVASCULAR DISEASE: ICD-10-CM

## 2022-10-05 DIAGNOSIS — N18.5 TYPE 2 DIABETES MELLITUS WITH STAGE 5 CHRONIC KIDNEY DISEASE NOT ON CHRONIC DIALYSIS, WITH LONG-TERM CURRENT USE OF INSULIN: ICD-10-CM

## 2022-10-05 DIAGNOSIS — E11.22 TYPE 2 DIABETES MELLITUS WITH STAGE 5 CHRONIC KIDNEY DISEASE NOT ON CHRONIC DIALYSIS, WITH LONG-TERM CURRENT USE OF INSULIN: ICD-10-CM

## 2022-10-05 DIAGNOSIS — I69.30 SEQUELAE, POST-STROKE: ICD-10-CM

## 2022-10-05 DIAGNOSIS — G47.33 OSA (OBSTRUCTIVE SLEEP APNEA): ICD-10-CM

## 2022-10-05 DIAGNOSIS — Z91.14 HISTORY OF MEDICATION NONCOMPLIANCE: ICD-10-CM

## 2022-10-05 DIAGNOSIS — Z91.81 AT RISK FOR FALLS: ICD-10-CM

## 2022-10-05 DIAGNOSIS — Z79.4 TYPE 2 DIABETES MELLITUS WITH STAGE 5 CHRONIC KIDNEY DISEASE NOT ON CHRONIC DIALYSIS, WITH LONG-TERM CURRENT USE OF INSULIN: ICD-10-CM

## 2022-10-05 DIAGNOSIS — I10 ESSENTIAL HYPERTENSION: ICD-10-CM

## 2022-10-05 PROCEDURE — 99215 OFFICE O/P EST HI 40 MIN: CPT | Performed by: NURSE PRACTITIONER

## 2022-10-05 RX ORDER — TORSEMIDE 10 MG/1
40 TABLET ORAL DAILY
Status: ON HOLD | COMMUNITY
Start: 2022-07-06 | End: 2023-03-09

## 2022-10-05 RX ORDER — LISINOPRIL 20 MG/1
20 TABLET ORAL DAILY
Status: ON HOLD | COMMUNITY
Start: 2022-07-02 | End: 2023-03-09

## 2022-10-05 RX ORDER — FUROSEMIDE 40 MG/1
10 TABLET ORAL DAILY
COMMUNITY
Start: 2022-07-02 | End: 2023-03-10 | Stop reason: HOSPADM

## 2022-10-05 RX ORDER — FOLIC ACID 1 MG/1
1000 TABLET ORAL DAILY
Status: ON HOLD | COMMUNITY
Start: 2022-07-02 | End: 2023-03-09

## 2022-10-05 NOTE — PROGRESS NOTES
"DOS: 10/6/2022  NAME: Carlito Mo   : 1955  PCP: Florencia Caballero MD    Chief Complaint   Patient presents with   • Stroke      SUBJECTIVE  Neurological Problem:  67 y.o.  LHW male with recurrent strokes, ?PAF, CAD (CABG x , ), DM, chronic anemia, CKD, HTN, HLD, CHF, YAW (on CPAP) who presents today for stroke f/u. He is accompanied by his spouse.     Interval History:   Mr. Mo has a history of recurrent strokes: (For previous detailed history please see progress note dated: 2021)    Mr. Mo has an extensive history of recurrent stroke as detailed in previous notes and has been treated with OAC last seen by me in the office in 2022, taking Eliquis 2.5 mg twice daily; however, patient had \"run out \"apparently was not taking regularly.  Samples were given in the office and medication compliance strongly recommended.    Unfortunately, patient presented to the ED on 9/15/2022 with recurrent falls and near syncopal episode, generalized weakness over the previous few days.  He was found to have acute right MCA and left PCA territory infarcts along with known severe white matter disease.  MRA of the head and carotid ultrasounds were unremarkable.  Orthostatics during that admission were also positive with BP upon standing at 91/66, orthostatic hypotension likely secondary to diabetic autonomic neuropathy.  It was also noted that he was not compliant with his Eliquis doses, had missed several.  His ASA was discontinued and Eliquis increased to 5 mg twice daily.    Patient presents today, recently discharged from rehab facility earlier this week, [facility], apparently did not get much physical therapy while there.  Patient states he still feels fairly weak.  He has been compliant while at the rehab facility and reports that he will not be getting his medications packaged from the pharmacy.  His spouse helps him with medications.  He denies any new stroke/TIA symptoms.  No falls.  During his " hospitalization his A1c was 8.10, creatinine 2.81, GFR 23.9, total cholesterol was 165, HDL 42, , TG 98.  He does not smoke.  No alcohol use.  He is currently using a walker, no falls since his discharge.    Review of Systems:Review of Systems   Endocrine: Negative for cold intolerance, heat intolerance and polydipsia.   Genitourinary: Negative for decreased urine volume, difficulty urinating and urgency.   Musculoskeletal: Positive for gait problem (falls). Negative for back pain, neck pain and neck stiffness.   Skin: Negative for color change, rash and wound.   Allergic/Immunologic: Negative for environmental allergies, food allergies and immunocompromised state.   Neurological: Positive for dizziness and weakness. Negative for tremors, seizures, syncope, facial asymmetry, speech difficulty, light-headedness, numbness and headaches.   Hematological: Negative for adenopathy.    Above ROS reviewed    The following portions of the patient's history were reviewed and updated as appropriate: allergies, current medications, past family history, past medical history, past social history, past surgical history and problem list.    Current Medications:   Current Outpatient Medications:   •  acetaminophen (TYLENOL) 325 MG tablet, Take 2 tablets by mouth Every 4 (Four) Hours As Needed for Mild Pain., Disp: , Rfl:   •  amLODIPine (NORVASC) 5 MG tablet, Take 1 tablet by mouth Daily., Disp: , Rfl:   •  apixaban (ELIQUIS) 5 MG tablet tablet, Take 1 tablet by mouth Every 12 (Twelve) Hours. Indications: Atrial Fibrillation, Disp: 60 tablet, Rfl:   •  atorvastatin (LIPITOR) 80 MG tablet, Take 1 tablet by mouth Every Night., Disp: 90 tablet, Rfl:   •  buPROPion XL (WELLBUTRIN XL) 150 MG 24 hr tablet, Take 300 mg by mouth Daily., Disp: , Rfl:   •  carvedilol (COREG) 3.125 MG tablet, Take 3.125 mg by mouth 2 (Two) Times a Day With Meals., Disp: , Rfl:   •  Cholecalciferol (VITAMIN D3) 5000 units capsule capsule, Take 5,000  Units by mouth Daily., Disp: , Rfl:   •  famotidine (PEPCID) 10 MG tablet, Take 10 mg by mouth 2 (Two) Times a Day., Disp: , Rfl:   •  folic acid (FOLVITE) 1 MG tablet, Take 1,000 mcg by mouth Daily., Disp: , Rfl:   •  furosemide (LASIX) 40 MG tablet, Take 40 mg by mouth 2 (Two) Times a Day., Disp: , Rfl:   •  Insulin Glargine (BASAGLAR KWIKPEN) 100 UNIT/ML injection pen, Inject 4 Units under the skin into the appropriate area as directed Every Night. Patient says he does not take consistently (Patient taking differently: Inject 4 Units under the skin into the appropriate area as directed Every Night.), Disp: 15 mL, Rfl: 3  •  insulin glulisine (Apidra) 100 UNIT/ML injection, Inject 2 Units under the skin into the appropriate area as directed 3 (Three) Times a Day Before Meals. Patient says he does not take consistently (Patient taking differently: Inject 2 Units under the skin into the appropriate area as directed 3 (Three) Times a Day Before Meals. HAS NOT STARTED TAKING YET, WAITING FOR SUPPLIES), Disp: 15 mL, Rfl: 3  •  insulin lispro (ADMELOG) 100 UNIT/ML injection, Inject 0-7 Units under the skin into the appropriate area as directed 3 (Three) Times a Day Before Meals., Disp: , Rfl: 12  •  levothyroxine (SYNTHROID, LEVOTHROID) 75 MCG tablet, Take 1 tablet by mouth Daily., Disp: 30 tablet, Rfl: 5  •  lisinopril (PRINIVIL,ZESTRIL) 20 MG tablet, Take 20 mg by mouth Daily., Disp: , Rfl:   •  polyethylene glycol (polyethylene glycol) 17 g packet, Take 17 g by mouth Daily As Needed (constipation)., Disp: , Rfl:   •  tamsulosin (FLOMAX) 0.4 MG capsule 24 hr capsule, Take 1 capsule by mouth Every Night., Disp: , Rfl:   •  torsemide (DEMADEX) 10 MG tablet, , Disp: , Rfl:   •  vitamin C (ASCORBIC ACID) 250 MG tablet, Take 1,000 mg by mouth Daily., Disp: , Rfl:   **I did not stop or change the above medications.  Patient's medication list was updated to reflect medications they have reported as currently taking,  including medication changes made by other providers.    OBJECTIVE  Vitals:    10/05/22 1412   BP: 126/80   Pulse: 69   SpO2: 96%     Body mass index is 21.29 kg/m².    Diagnostics:  MRI brain, MRA, carotid ultrasound September 2022 as noted above    Laboratory Results:         Lab Results   Component Value Date    WBC 5.59 09/17/2022    HGB 9.6 (L) 09/17/2022    HCT 30.5 (L) 09/17/2022    MCV 82.0 09/17/2022     09/17/2022     Lab Results   Component Value Date    GLUCOSE 125 (H) 09/19/2022    BUN 56 (H) 09/19/2022    CREATININE 2.81 (H) 09/19/2022    EGFRIFNONA 19 (L) 02/04/2022    EGFRIFAFRI 74 12/12/2017    BCR 19.9 09/19/2022    K 4.8 09/19/2022    CO2 23.8 09/19/2022    CALCIUM 8.8 09/19/2022    PROTENTOTREF 5.9 (L) 09/16/2022    ALBUMIN 3.50 09/19/2022    LABIL2 1.4 09/16/2022    AST 26 09/15/2022    ALT 30 09/15/2022     Lab Results   Component Value Date    HGBA1C 8.10 (H) 09/17/2022     Lab Results   Component Value Date    CHOL 165 09/17/2022    CHOL 229 (H) 06/05/2022    CHOL 187 03/02/2022     Lab Results   Component Value Date    HDL 42 09/17/2022    HDL 42 06/05/2022    HDL 47 03/02/2022     Lab Results   Component Value Date     (H) 09/17/2022     (H) 06/05/2022     (H) 03/02/2022     Lab Results   Component Value Date    TRIG 98 09/17/2022    TRIG 83 06/05/2022    TRIG 59 03/02/2022     No results found for: RPR  Lab Results   Component Value Date    TSH 1.610 09/16/2022     Lab Results   Component Value Date    [id number] 844 09/17/2022       Physical Exam:  GENERAL: NAD, thin, Ill-appearing, disheveled appearance  HEENT: Normocephalic, atraumatic   COR: RRR  Resp: Even and unlabored  Extremities: Fistula of right wrist   Skin: No rashes, lesions or ulcers, ashen pallor.  Psychiatric: Normal mood and affect.    Neurological:   MS: AO. Language normal. No neglect. Follows commands.  CN: II-XII grossly normal  Motor: Overall decreased bulk and generalized decreased  strength, 4 -/5.  Some bradycardia kinetic movements.  No tremor observed   Sensory: Intact to light touch in arms and legs  Station and Gait: Slow, cautious, somewhat stooped posture, utilizing walker today  Coordination: Normal finger to nose bilaterally.  Slow rapid alternating movements    Impression/Plan:    1.  Recurrent strokes --patient with h/o of medication non-compliance, currently being treated with Eliquis 5 mg twice daily.   HLD --most recent LDL was 105, continue Lipitor daily, follow-up with PCP for continued surveillance, goal LDL less than 70  HTN --BP well controlled on today's vitals, continue to monitor closely, known orthostatic hypotension, keep well-hydrated.  DM: Not well controlled, last A1c 8.1  YAW: Strongly encourage compliance with CPAP  Secondary stroke prevention: Ideal targets for stroke prevention would be Blood pressure < 130/80; B12 > 500 TSH in normal range and LDL < 70; HbA1c < 6.5 and smoking cessation if applicable.  Call 911 for stroke symptoms    2. Post-stroke sequelae --patient continues with generalized weakness, unsteady gait, have referred for PT.  Also has cognitive complaints are likely vascular in etiology, strongly encouraged aggressive control of vascular risk factors as noted in #1, Its unclear if he tolerated memantine could consider retrial of this.  Will check  [place] at his next follow-up visit.    3. Risk for falls --discussed importance of prevention, have referred to physical therapy.    4.  History of medication noncompliance --reviewed the importance of taking medications regularly with patient and spouse today.        I spent a total of 40 minutes on day of visit in reviewing records, prior diagnostics, examination of patient as well as counseling and educating patient regarding diagnoses, symptoms, reviewing diagnostics with patient, pharmacologic treatment options including purpose, risk, benefits, possible side-effects, recommendations, lifestyle  modifications, coordination of care and documenting plan of care.          Diagnoses and all orders for this visit:    1. Cerebrovascular disease    2. Sequelae, post-stroke  -     Ambulatory Referral to Physical Therapy Evaluate and treat, Neuro    3. YAW (obstructive sleep apnea)    4. At risk for falls    5. Essential hypertension    6. Type 2 diabetes mellitus with stage 5 chronic kidney disease not on chronic dialysis, with long-term current use of insulin (HCC)    7. History of medication noncompliance        Coding      Dictated using Dragon

## 2022-12-01 ENCOUNTER — APPOINTMENT (OUTPATIENT)
Dept: GENERAL RADIOLOGY | Facility: HOSPITAL | Age: 67
End: 2022-12-01

## 2022-12-01 ENCOUNTER — HOSPITAL ENCOUNTER (EMERGENCY)
Facility: HOSPITAL | Age: 67
Discharge: HOME | End: 2022-12-01
Attending: EMERGENCY MEDICINE | Admitting: EMERGENCY MEDICINE

## 2022-12-01 VITALS
OXYGEN SATURATION: 100 % | RESPIRATION RATE: 18 BRPM | SYSTOLIC BLOOD PRESSURE: 140 MMHG | TEMPERATURE: 98.7 F | DIASTOLIC BLOOD PRESSURE: 74 MMHG | HEART RATE: 95 BPM

## 2022-12-01 DIAGNOSIS — U07.1 COVID-19: Primary | ICD-10-CM

## 2022-12-01 DIAGNOSIS — R53.1 GENERALIZED WEAKNESS: ICD-10-CM

## 2022-12-01 LAB
ALBUMIN SERPL-MCNC: 3.7 G/DL (ref 3.5–5.2)
ALBUMIN/GLOB SERPL: 1.5 G/DL
ALP SERPL-CCNC: 75 U/L (ref 39–117)
ALT SERPL W P-5'-P-CCNC: 11 U/L (ref 1–41)
ANION GAP SERPL CALCULATED.3IONS-SCNC: 14.1 MMOL/L (ref 5–15)
AST SERPL-CCNC: 18 U/L (ref 1–40)
BACTERIA UR QL AUTO: ABNORMAL /HPF
BASOPHILS # BLD AUTO: 0.01 10*3/MM3 (ref 0–0.2)
BASOPHILS NFR BLD AUTO: 0.2 % (ref 0–1.5)
BILIRUB SERPL-MCNC: 0.3 MG/DL (ref 0–1.2)
BILIRUB UR QL STRIP: NEGATIVE
BUN SERPL-MCNC: 44 MG/DL (ref 8–23)
BUN/CREAT SERPL: 15.5 (ref 7–25)
CALCIUM SPEC-SCNC: 8.7 MG/DL (ref 8.6–10.5)
CHLORIDE SERPL-SCNC: 107 MMOL/L (ref 98–107)
CLARITY UR: CLEAR
CO2 SERPL-SCNC: 19.9 MMOL/L (ref 22–29)
COLOR UR: YELLOW
CREAT SERPL-MCNC: 2.84 MG/DL (ref 0.76–1.27)
DEPRECATED RDW RBC AUTO: 44.6 FL (ref 37–54)
EGFRCR SERPLBLD CKD-EPI 2021: 23.6 ML/MIN/1.73
EOSINOPHIL # BLD AUTO: 0 10*3/MM3 (ref 0–0.4)
EOSINOPHIL NFR BLD AUTO: 0 % (ref 0.3–6.2)
ERYTHROCYTE [DISTWIDTH] IN BLOOD BY AUTOMATED COUNT: 13.9 % (ref 12.3–15.4)
FLUAV SUBTYP SPEC NAA+PROBE: NOT DETECTED
FLUBV RNA ISLT QL NAA+PROBE: NOT DETECTED
GLOBULIN UR ELPH-MCNC: 2.5 GM/DL
GLUCOSE SERPL-MCNC: 102 MG/DL (ref 65–99)
GLUCOSE UR STRIP-MCNC: NEGATIVE MG/DL
HCT VFR BLD AUTO: 29.9 % (ref 37.5–51)
HGB BLD-MCNC: 9.2 G/DL (ref 13–17.7)
HGB UR QL STRIP.AUTO: ABNORMAL
HOLD SPECIMEN: NORMAL
HOLD SPECIMEN: NORMAL
HYALINE CASTS UR QL AUTO: ABNORMAL /LPF
IMM GRANULOCYTES # BLD AUTO: 0.01 10*3/MM3 (ref 0–0.05)
IMM GRANULOCYTES NFR BLD AUTO: 0.2 % (ref 0–0.5)
KETONES UR QL STRIP: NEGATIVE
LEUKOCYTE ESTERASE UR QL STRIP.AUTO: NEGATIVE
LYMPHOCYTES # BLD AUTO: 0.87 10*3/MM3 (ref 0.7–3.1)
LYMPHOCYTES NFR BLD AUTO: 20.2 % (ref 19.6–45.3)
MAGNESIUM SERPL-MCNC: 2.1 MG/DL (ref 1.6–2.4)
MCH RBC QN AUTO: 26.7 PG (ref 26.6–33)
MCHC RBC AUTO-ENTMCNC: 30.8 G/DL (ref 31.5–35.7)
MCV RBC AUTO: 86.9 FL (ref 79–97)
MONOCYTES # BLD AUTO: 0.71 10*3/MM3 (ref 0.1–0.9)
MONOCYTES NFR BLD AUTO: 16.5 % (ref 5–12)
NEUTROPHILS NFR BLD AUTO: 2.7 10*3/MM3 (ref 1.7–7)
NEUTROPHILS NFR BLD AUTO: 62.9 % (ref 42.7–76)
NITRITE UR QL STRIP: NEGATIVE
NRBC BLD AUTO-RTO: 0 /100 WBC (ref 0–0.2)
PH UR STRIP.AUTO: 5.5 [PH] (ref 5–8)
PLATELET # BLD AUTO: 180 10*3/MM3 (ref 140–450)
PMV BLD AUTO: 10.5 FL (ref 6–12)
POTASSIUM SERPL-SCNC: 4 MMOL/L (ref 3.5–5.2)
PROT SERPL-MCNC: 6.2 G/DL (ref 6–8.5)
PROT UR QL STRIP: ABNORMAL
RBC # BLD AUTO: 3.44 10*6/MM3 (ref 4.14–5.8)
RBC # UR STRIP: ABNORMAL /HPF
REF LAB TEST METHOD: ABNORMAL
SARS-COV-2 RNA PNL SPEC NAA+PROBE: DETECTED
SODIUM SERPL-SCNC: 141 MMOL/L (ref 136–145)
SP GR UR STRIP: 1.02 (ref 1–1.03)
SQUAMOUS #/AREA URNS HPF: ABNORMAL /HPF
TROPONIN T SERPL-MCNC: 0.1 NG/ML (ref 0–0.03)
UROBILINOGEN UR QL STRIP: ABNORMAL
WBC # UR STRIP: ABNORMAL /HPF
WBC NRBC COR # BLD: 4.3 10*3/MM3 (ref 3.4–10.8)
WHOLE BLOOD HOLD COAG: NORMAL
WHOLE BLOOD HOLD SPECIMEN: NORMAL

## 2022-12-01 PROCEDURE — 93005 ELECTROCARDIOGRAM TRACING: CPT

## 2022-12-01 PROCEDURE — 71045 X-RAY EXAM CHEST 1 VIEW: CPT

## 2022-12-01 PROCEDURE — 83735 ASSAY OF MAGNESIUM: CPT

## 2022-12-01 PROCEDURE — 99285 EMERGENCY DEPT VISIT HI MDM: CPT

## 2022-12-01 PROCEDURE — 84484 ASSAY OF TROPONIN QUANT: CPT

## 2022-12-01 PROCEDURE — 81001 URINALYSIS AUTO W/SCOPE: CPT | Performed by: EMERGENCY MEDICINE

## 2022-12-01 PROCEDURE — 99284 EMERGENCY DEPT VISIT MOD MDM: CPT

## 2022-12-01 PROCEDURE — 87636 SARSCOV2 & INF A&B AMP PRB: CPT | Performed by: NURSE PRACTITIONER

## 2022-12-01 PROCEDURE — 93010 ELECTROCARDIOGRAM REPORT: CPT | Performed by: INTERNAL MEDICINE

## 2022-12-01 PROCEDURE — 85025 COMPLETE CBC W/AUTO DIFF WBC: CPT

## 2022-12-01 PROCEDURE — 80053 COMPREHEN METABOLIC PANEL: CPT

## 2022-12-01 PROCEDURE — 93005 ELECTROCARDIOGRAM TRACING: CPT | Performed by: EMERGENCY MEDICINE

## 2022-12-01 RX ORDER — SODIUM CHLORIDE 0.9 % (FLUSH) 0.9 %
10 SYRINGE (ML) INJECTION AS NEEDED
Status: DISCONTINUED | OUTPATIENT
Start: 2022-12-01 | End: 2022-12-02 | Stop reason: HOSPADM

## 2022-12-01 RX ADMIN — SODIUM CHLORIDE 1000 ML: 9 INJECTION, SOLUTION INTRAVENOUS at 22:08

## 2022-12-01 NOTE — ED TRIAGE NOTES
"Pt comes to ER from home for generalized weakness, fatigue, cough. Everybody in his house is \"sick\".     Pt and RN wearing mask upon triage.     "

## 2022-12-02 LAB — QT INTERVAL: 477 MS

## 2022-12-02 NOTE — DISCHARGE INSTRUCTIONS
You have been evaluated today in the ER for Generalized Weakness. You tested positive for Covid today  Increase fluids, Tylenol as needed for fever/bodyaches  Follow up with your PMD in 3-5 days for recheck  Return to the ER for fever, chills, chest pain, shortness of air, dizziness, vomiting, or any new or worsening symptoms

## 2022-12-02 NOTE — ED PROVIDER NOTES
Pt presents to the ED c/o  generalized weakness, fatigue, cough, recent COVID exposure.  No fevers, chest pain, shortness of breath, nausea, vomiting, diarrhea.     On exam,   General: No acute distress, nontoxic  HEENT: Mucous membranes moist, atraumatic, EOMI  Neck: Full ROM  Pulm: Symmetric chest rise, nonlabored, lungs CTAB  Cardiovascular: Regular rate and rhythm, intact distal pulses  GI: Soft, nontender, nondistended, no rebound, no guarding, bowel sounds present  MSK: Full ROM, no deformity  Skin: Warm, dry  Neuro: Awake, alert, oriented x 4, GCS 15, moving all extremities, no focal deficits  Psych: Calm, cooperative      N95, protective eye goggles, and gloves used during this encounter. Patient in surgical mask.      Plan:   ED Course as of 12/01/22 2337   Thu Dec 01, 2022   2155 Patient is a 67-year-old who presents today with fatigue, generalized weakness, cough for the past 2 days.  He does have a positive exposure to COVID-19 after his son tested positive.  Work-up thus far shows BUN and creatinine of 44 2.84 which is stable for the patient due to his CKD.  He has an elevated troponin at 0.103 which is also chronically elevated due to CKD.  Chest x-ray shows no pneumonia.  COVID-19 and flu swab has been ordered. [MS]   2241 Reviewed pt's history and workup with Dr. Dutta.  After a bedside evaluation, he agrees with the plan of care.     [MS]   2241 Discussed with [facility] pharmacist regarding paxlovid if patient were to test positive for COVID.  Patient GFR is 23.6 therefore he does not qualify to receive this. [MS]   2325 COVID19(!!): Detected  Patient updated on work-up done here today, positive COVID-19.  Discussed this is the cause of his feeling fatigued and rundown.  Discussed symptomatic treatment with the patient.  Patient has been afebrile, he is not tachycardic he is not hypoxic.  Discussed safe for discharge with strict return to ER precautions.  He should follow-up with his PMD and  nephrologist as needed.  He verbalized understanding and is agreeable to this plan. [MS]      ED Course User Index  [MS] Myrna Elliott, JI     Reassuring vitals, reassuring work-up to this point, awaiting COVID results but plan for discharge, will look into Paxlovid for him if COVID + and if his medications allow.  Supportive care measures, outpatient PCP follow-up, ED return for worsening symptoms as needed.     Attestation:  The XUAN and I have discussed this patient's history, physical exam, and treatment plan.  I have reviewed the documentation and personally had a face to face interaction with the patient. I affirm the documentation and agree with the treatment and plan.  The attached note describes my personal findings.          Cristi Dutta MD  12/01/22 1309       Cristi Dutta MD  12/01/22 4324

## 2022-12-02 NOTE — ED PROVIDER NOTES
EMERGENCY DEPARTMENT ENCOUNTER    Room Number:  18/18  Date of encounter:  12/1/2022  PCP: Florencia Caballero MD  Historian: Patient      PPE    Patient was placed in face mask in first look. Patient was wearing facemask when I entered the room and throughout our encounter. I wore full protective equipment throughout this patient encounter including a face mask, and gloves. Hand hygiene was performed before donning protective equipment and after removal when leaving the room.        HPI:  Chief Complaint: Generalized weakness  A complete HPI/ROS/PMH/PSH/SH/FH are unobtainable due to: Poor historian    Context: Carlito Mo is a 67 y.o. male with a history of DM 2, CKD, CAD, CHF, HTN who arrives to the ED via EMS from home.  Patient presents with c/o generalized weakness and fatigue for the past 2 days.   Patient also complains of nonproductive cough.  Patient states that his son tested positive for COVID recently and he does live with him.  Patient denies known fever chest pain, nausea, vomiting, diarrhea, headache, dysuria.  Patient states that nothing makes the symptoms better and nothing worsens symptoms.          PAST MEDICAL HISTORY  Active Ambulatory Problems     Diagnosis Date Noted   • Major depressive disorder with single episode, in partial remission ([facility])    • Other hyperlipidemia    • Type 2 diabetes mellitus with stage 5 chronic kidney disease not on chronic dialysis, with long-term current use of insulin ([facility])    • Vitamin D deficiency 12/17/2015   • Erectile dysfunction 12/17/2015   • Chronic fatigue 04/25/2016   • Type 2 diabetes mellitus with ophthalmic complication ([facility]) 04/25/2016   • Skin lesion of chest wall 06/20/2016   • Slow transit constipation 09/01/2016   • Benign prostatic hyperplasia with nocturia 12/20/2016   • History of basal cell carcinoma 12/20/2016   • High blood pressure associated with diabetes (HCC) 12/20/2016   • Acquired hypothyroidism 12/20/2017   • Hypertensive urgency  02/16/2018   • Recurrent strokes (formerly [facility]) 02/20/2018   • Noncompliance w/medication treatment due to intermit use of medication 05/04/2018   • Urinary retention 09/20/2018   • Pneumonia 09/21/2018   • Acute on chronic combined systolic and diastolic congestive heart failure (HCC) 09/21/2018   • Acute respiratory failure with hypoxia (formerly [facility]) 09/21/2018   • Suspected sleep apnea 10/29/2018   • Pleural effusion 12/01/2018   • YAW (obstructive sleep apnea) 12/13/2018   • Coronary artery disease involving native coronary artery of native heart without angina pectoris 04/18/2019   • Chronically elevated troponin 07/02/2019   • Colon cancer screening 10/30/2018   • Enlarged lymph nodes 10/30/2018   • Functional gait abnormality 10/30/2018   • History of myocardial infarction 02/06/2019   • Hospital discharge follow-up 05/31/2019   • Insomnia 02/06/2019   • Iron deficiency anemia 10/02/2018   • Major depression, recurrent (formerly [facility]) 10/16/2012   • Anemia, chronic renal failure, stage 3 (moderate) (formerly [facility]) 02/27/2020   • Essential hypertension 03/10/2020   • Adverse effect of iron and its compounds, initial encounter 05/23/2020   • Acute on chronic renal insufficiency 05/25/2020   • Debility 05/25/2020   • Falls frequently 05/25/2020   • Acute pain of right shoulder 05/27/2020   • Acute on chronic anemia 05/25/2020   • Heme positive stool 05/25/2020   • Neurogenic orthostatic hypotension (HCC) 05/30/2020   • Symptomatic anemia 05/25/2020   • History of colon polyps 05/25/2020   • Helicobacter pylori gastritis 06/04/2020   • Esophagitis 06/10/2020   • Sleep related hypoxia 07/23/2020   • Embolic stroke (formerly [facility]) 05/20/2021   • Patent foramen ovale 05/22/2021   • Pleural effusion, bilateral 05/22/2021   • Cardiomyopathy (formerly [facility]) 05/24/2021   • Lower abdominal pain 12/13/2021   • Diarrhea 12/14/2021   • CKD (chronic kidney disease) stage 4, GFR 15-29 ml/min (formerly [facility]) 12/16/2021   • Hypertension not at goal 02/02/2022   • Memory loss due to medical  condition 02/08/2022   • Generalized weakness 03/01/2022   • ERRONEOUS ENCOUNTER--DISREGARD 03/09/2022   • Weakness 06/03/2022   • Paroxysmal atrial fibrillation (HCC) 07/25/2022   • Chronic anticoagulation 07/25/2022   • Multiple falls 09/15/2022   • Dehydration 09/16/2022   • SYLVAIN (acute kidney injury) (HCC) 09/16/2022   • Acute ischemic stroke ([facility]) 09/17/2022     Resolved Ambulatory Problems     Diagnosis Date Noted   • Anemia    • Arthritis    • Diabetes mellitus out of control    • Acute sinusitis    • Accumulation of fluid in tissues 12/17/2015   • Fatigue 12/17/2015   • Cardiomyopathy, ischemic 12/17/2015   • Acute appendicitis 03/02/2016   • Atherosclerosis of coronary artery 10/16/2012   • Altered mental status 11/30/2017   • Hypertensive encephalopathy syndrome 04/30/2018   • Hyperglycemia 05/04/2018   • Screening for colorectal cancer 08/22/2018   • Constipation 08/22/2018   • Snoring 12/13/2018   • Lower abdominal pain 05/27/2020   • Hypertensive emergency 02/01/2022     Past Medical History:   Diagnosis Date   • Acute congestive heart failure (HCC)    • CAD (coronary artery disease)    • Cancer (HCC)    • Chronic combined systolic and diastolic congestive heart failure (HCC)    • Chronic ischemic heart disease    • Chronic kidney disease (CKD)    • CKD (chronic kidney disease)    • COPD (chronic obstructive pulmonary disease) ([facility])    • Depression    • Diabetes mellitus type 2, uncontrolled, without complications    • Diabetic neuropathy ([facility])    • Disease of thyroid gland    • Elevated cholesterol    • Frequent PVCs    • GERD (gastroesophageal reflux disease)    • Heart attack ([facility])    • History of coronary artery disease    • Hyperlipidemia    • Hypertension    • Hypertensive encephalopathy    • Mental status change    • NO DEVICE/RECALLED    • Poor historian    • RBBB (right bundle branch block)    • Stroke ([facility])    • TIA (transient ischemic attack)    • Type 2 diabetes mellitus ([facility])          PAST  SURGICAL HISTORY  Past Surgical History:   Procedure Laterality Date   • APPENDECTOMY N/A 2016    Dr. Alexey Dodson   • ARTERIOVENOUS FISTULA/SHUNT SURGERY Right 6/3/2022    Procedure: RIGHT FOREARM ARTERIOVENOUS FISTULA PLACEMENT RADIOCEPHALIC WITH CEPHALIC VEIN TRANSPOSITION;  Surgeon: Eliseo Willis MD;  Location: [facility] MAIN OR;  Service: Vascular;  Laterality: Right;   • COLONOSCOPY      over 20 years ago.  no polyps    • COLONOSCOPY N/A 2018    Procedure: COLONOSCOPY;  Surgeon: Jose Enrique Louie MD;  Location: [facility] ENDOSCOPY;  Service: Gastroenterology   • COLONOSCOPY N/A 2018    IH, EH, polyps (TAs w/low grade dysplasia)   • COLONOSCOPY N/A 2020    Procedure: COLONOSCOPY;  Surgeon: Jose Enrique Louie MD;  Location: [facility] ENDOSCOPY;  Service: Gastroenterology;  Laterality: N/A;  Pre op-Anemia, Melena, History of Polyps  Post op-To Cecum/TI, Polyp, Poor Prep   • CORONARY ARTERY BYPASS GRAFT  2001   • ENDOSCOPY N/A 2020    Procedure: ESOPHAGOGASTRODUODENOSCOPY with biopsies;  Surgeon: Amanda Lovelace MD;  Location: [facility] ENDOSCOPY;  Service: Gastroenterology;  Laterality: N/A;  pre-anemia, dark stools  post-esophagitis, hiatal hernia   • ENDOSCOPY N/A 9/15/2020    Procedure: ESOPHAGOGASTRODUODENOSCOPY WITH BIOPSIES;  Surgeon: Jose Enrique Louie MD;  Location: [facility] ENDOSCOPY;  Service: Gastroenterology;  Laterality: N/A;  pre: history of melena and esophagitis  post: mild esophagitis and gastritis, small hiatal hernia   • HERNIA REPAIR Left 2019   • TONSILLECTOMY     • VASECTOMY           FAMILY HISTORY  Family History   Problem Relation Age of Onset   • Diabetes Mother    • Hypertension Mother    • Macular degeneration Mother    • Alcohol abuse Father    • Cancer Father         lung,  age 65   • Heart disease Father    • Alcohol abuse Brother    • Cirrhosis Brother          age 50   • Liver cancer Brother 45   • Diabetes Son    • Kidney  failure Son    • Autism Son    • Malig Hyperthermia Neg Hx          SOCIAL HISTORY  Social History     Socioeconomic History   • Marital status:      Spouse name: Lissette   • Number of children: 3   • Years of education: college   Tobacco Use   • Smoking status: Never   • Smokeless tobacco: Never   • Tobacco comments:     daily caffeine   Vaping Use   • Vaping Use: Never used   Substance and Sexual Activity   • Alcohol use: No     Comment: last drink 2 months ago   • Drug use: No   • Sexual activity: Defer         ALLERGIES  Prozac [fluoxetine hcl]        REVIEW OF SYSTEMS  Review of Systems     All systems reviewed and negative except for those discussed in HPI.        PHYSICAL EXAM    ED Triage Vitals   Temp Heart Rate Resp BP SpO2   12/01/22 1726 12/01/22 1726 12/01/22 1726 12/01/22 1726 12/01/22 1726   98.7 °F (37.1 °C) 88 16 (!) 166/102 98 %       Physical Exam  GENERAL: Appears older than stated age, nontoxic appearing, not distressed  HENT: normocephalic, atraumatic  EYES: no scleral icterus, PERRL  CV: regular rhythm, regular rate, no murmur  RESPIRATORY: normal effort, CTAB  ABDOMEN: soft, nontender  MUSCULOSKELETAL: no deformity, no lower extremity edema or calf tenderness bilaterally  NEURO: alert, moves all extremities, follows commands, mental status normal/baseline  SKIN: warm, dry, no rash   Psych: Appropriate mood and affect  Nursing notes and vital signs reviewed      LAB RESULTS  Recent Results (from the past 24 hour(s))   Comprehensive Metabolic Panel    Collection Time: 12/01/22  7:41 PM    Specimen: Blood   Result Value Ref Range    Glucose 102 (H) 65 - 99 mg/dL    BUN 44 (H) 8 - 23 mg/dL    Creatinine 2.84 (H) 0.76 - 1.27 mg/dL    Sodium 141 136 - 145 mmol/L    Potassium 4.0 3.5 - 5.2 mmol/L    Chloride 107 98 - 107 mmol/L    CO2 19.9 (L) 22.0 - 29.0 mmol/L    Calcium 8.7 8.6 - 10.5 mg/dL    Total Protein 6.2 6.0 - 8.5 g/dL    Albumin 3.70 3.50 - 5.20 g/dL    ALT (SGPT) 11 1 - 41 U/L    AST  (SGOT) 18 1 - 40 U/L    Alkaline Phosphatase 75 39 - 117 U/L    Total Bilirubin 0.3 0.0 - 1.2 mg/dL    Globulin 2.5 gm/dL    A/G Ratio 1.5 g/dL    BUN/Creatinine Ratio 15.5 7.0 - 25.0    Anion Gap 14.1 5.0 - 15.0 mmol/L    eGFR 23.6 (L) >60.0 mL/min/1.73   Troponin    Collection Time: 12/01/22  7:41 PM    Specimen: Blood   Result Value Ref Range    Troponin T 0.103 (C) 0.000 - 0.030 ng/mL   Magnesium    Collection Time: 12/01/22  7:41 PM    Specimen: Blood   Result Value Ref Range    Magnesium 2.1 1.6 - 2.4 mg/dL   Green Top (Gel)    Collection Time: 12/01/22  7:41 PM   Result Value Ref Range    Extra Tube Hold for add-ons.    Lavender Top    Collection Time: 12/01/22  7:41 PM   Result Value Ref Range    Extra Tube hold for add-on    Gold Top - SST    Collection Time: 12/01/22  7:41 PM   Result Value Ref Range    Extra Tube Hold for add-ons.    Light Blue Top    Collection Time: 12/01/22  7:41 PM   Result Value Ref Range    Extra Tube Hold for add-ons.    CBC Auto Differential    Collection Time: 12/01/22  7:41 PM    Specimen: Blood   Result Value Ref Range    WBC 4.30 3.40 - 10.80 10*3/mm3    RBC 3.44 (L) 4.14 - 5.80 10*6/mm3    Hemoglobin 9.2 (L) 13.0 - 17.7 g/dL    Hematocrit 29.9 (L) 37.5 - 51.0 %    MCV 86.9 79.0 - 97.0 fL    MCH 26.7 26.6 - 33.0 pg    MCHC 30.8 (L) 31.5 - 35.7 g/dL    RDW 13.9 12.3 - 15.4 %    RDW-SD 44.6 37.0 - 54.0 fl    MPV 10.5 6.0 - 12.0 fL    Platelets 180 140 - 450 10*3/mm3    Neutrophil % 62.9 42.7 - 76.0 %    Lymphocyte % 20.2 19.6 - 45.3 %    Monocyte % 16.5 (H) 5.0 - 12.0 %    Eosinophil % 0.0 (L) 0.3 - 6.2 %    Basophil % 0.2 0.0 - 1.5 %    Immature Grans % 0.2 0.0 - 0.5 %    Neutrophils, Absolute 2.70 1.70 - 7.00 10*3/mm3    Lymphocytes, Absolute 0.87 0.70 - 3.10 10*3/mm3    Monocytes, Absolute 0.71 0.10 - 0.90 10*3/mm3    Eosinophils, Absolute 0.00 0.00 - 0.40 10*3/mm3    Basophils, Absolute 0.01 0.00 - 0.20 10*3/mm3    Immature Grans, Absolute 0.01 0.00 - 0.05 10*3/mm3    nRBC  0.0 0.0 - 0.2 /100 WBC   ECG 12 Lead ED Triage Standing Order; Weak / Dizzy / AMS    Collection Time: 12/01/22  8:35 PM   Result Value Ref Range    QT Interval 477 ms   Urinalysis With Culture If Indicated - Urine, Clean Catch    Collection Time: 12/01/22  9:02 PM    Specimen: Urine, Clean Catch   Result Value Ref Range    Color, UA Yellow Yellow, Straw    Appearance, UA Clear Clear    pH, UA 5.5 5.0 - 8.0    Specific Gravity, UA 1.018 1.005 - 1.030    Glucose, UA Negative Negative    Ketones, UA Negative Negative    Bilirubin, UA Negative Negative    Blood, UA Small (1+) (A) Negative    Protein, UA >=300 mg/dL (3+) (A) Negative    Leuk Esterase, UA Negative Negative    Nitrite, UA Negative Negative    Urobilinogen, UA 0.2 E.U./dL 0.2 - 1.0 E.U./dL   Urinalysis, Microscopic Only - Urine, Clean Catch    Collection Time: 12/01/22  9:02 PM    Specimen: Urine, Clean Catch   Result Value Ref Range    RBC, UA 3-5 (A) None Seen, 0-2 /HPF    WBC, UA 0-2 None Seen, 0-2 /HPF    Bacteria, UA Trace (A) None Seen /HPF    Squamous Epithelial Cells, UA 3-6 (A) None Seen, 0-2 /HPF    Hyaline Casts, UA 7-12 None Seen /LPF    Methodology Manual Light Microscopy    COVID-19 and FLU A/B PCR - Swab, Nasopharynx    Collection Time: 12/01/22 10:08 PM    Specimen: Nasopharynx; Swab   Result Value Ref Range    COVID19 Detected (C) Not Detected - Ref. Range    Influenza A PCR Not Detected Not Detected    Influenza B PCR Not Detected Not Detected       Ordered the above labs and independently reviewed the results.      RADIOLOGY  XR Chest 1 View    Result Date: 12/1/2022  XR CHEST 1 VW-  HISTORY: Male who is 67 years-old,  weakness  TECHNIQUE: Frontal views of the chest  COMPARISON: 07/24/2022  FINDINGS:. Heart size is normal. Aorta is calcified. Pulmonary vasculature is unremarkable. Sternotomy wires are present. No focal pulmonary consolidation, pleural effusion, or pneumothorax. No acute osseous process.      No focal pulmonary  consolidation. Follow-up as clinical indications persist.  This report was finalized on 12/1/2022 6:35 PM by Dr. Drake Dennis M.D.        I ordered the above noted radiological studies and viewed the images on the PACS system.       EKG      Independently viewed by me and interpreted by Dr Dutta         MEDICAL RECORD REVIEW  Medical records reviewed in [facility], patient's last admission was September 2022 for acute ischemic stroke, dehydration, SYLVAIN.  Patient had MRI brain and MRI angio head 9/17/22-    PROCEDURES    Procedures        DIFFERENTIAL DIAGNOSIS  Differential Diagnosis for Weakness include but not limited to the following:  Generalized weakness, CVA, TIA, Acute MI, GI Bleed, UTI, Sepsis, ETOH use, Drug use( prescription and street/recreational)        PROGRESS, DATA ANALYSIS, CONSULTS, AND MEDICAL DECISION MAKING        ED Course as of 12/01/22 2332   Thu Dec 01, 2022   2155 Patient is a 67-year-old who presents today with fatigue, generalized weakness, cough for the past 2 days.  He does have a positive exposure to COVID-19 after his son tested positive.  Work-up thus far shows BUN and creatinine of 44 2.84 which is stable for the patient due to his CKD.  He has an elevated troponin at 0.103 which is also chronically elevated due to CKD.  Chest x-ray shows no pneumonia.  COVID-19 and flu swab has been ordered. [MS]   2241 Reviewed pt's history and workup with Dr. Dutta.  After a bedside evaluation, he agrees with the plan of care.     [MS]   2241 Discussed with Dakotah,  pharmacist regarding paxlovid if patient were to test positive for COVID.  Patient GFR is 23.6 therefore he does not qualify to receive this. [MS]   2325 COVID19(!!): Detected  Patient updated on work-up done here today, positive COVID-19.  Discussed this is the cause of his feeling fatigued and rundown.  Discussed symptomatic treatment with the patient.  Patient has been afebrile, he is not tachycardic he is not hypoxic.  Discussed safe  for discharge with strict return to ER precautions.  He should follow-up with his PMD and nephrologist as needed.  He verbalized understanding and is agreeable to this plan. [MS]      ED Course User Index  [MS] Myrna Elliott APRN       Discussed plan for discharge, as there is no emergent indication for admission. Pt/family is agreeable and understands need for follow up and repeat testing.  Pt is aware that discharge does not mean that nothing is wrong but it indicates no emergency is present that requires admission and they must continue care with follow-up as given below or physician of their choice.   Patient/Family voiced understanding of above instructions.  Patient discharged in stable condition.    DIAGNOSIS  Final diagnoses:   COVID-19   Generalized weakness       FOLLOW UP   Florencia Caballero MD  [address] [facility]  [address]  [facility] [id number] [phone]    Call in 1 day      Nima Huddleston MD  [address] [facility]  [address]  [facility] [id number] [phone]    Schedule an appointment as soon as possible for a visit   As needed      RX     Medication List      Changed    Apidra 100 UNIT/ML injection  Generic drug: insulin glulisine  Inject 2 Units under the skin into the appropriate area as directed 3 (Three) Times a Day Before Meals. Patient says he does not take consistently  What changed: additional instructions     BASAGLAR KWIKPEN 100 UNIT/ML injection pen  Inject 4 Units under the skin into the appropriate area as directed Every Night. Patient says he does not take consistently  What changed: additional instructions              MEDICATIONS GIVEN IN ED    Medications   sodium chloride 0.9 % flush 10 mL (has no administration in time range)   sodium chloride 0.9 % bolus 1,000 mL (1,000 mL Intravenous New Bag 12/1/22 4182)           COURSE & MEDICAL DECISION MAKING  Any/All labs and Any/All Imaging studies that were ordered were reviewed and are noted above.  Results were  reviewed/discussed with the patient and they were also made aware of online access.    Pt also made aware that some labs, such as cultures, will not be resulted during ER visit and followup with PMD is necessary.        Myrna Elliott, APRN  12/01/22 8228

## 2023-03-06 ENCOUNTER — HOSPITAL ENCOUNTER (INPATIENT)
Facility: HOSPITAL | Age: 68
LOS: 2 days | Discharge: HOME | DRG: 291 | End: 2023-03-10
Attending: EMERGENCY MEDICINE | Admitting: INTERNAL MEDICINE
Payer: MEDICARE

## 2023-03-06 ENCOUNTER — APPOINTMENT (OUTPATIENT)
Dept: GENERAL RADIOLOGY | Facility: HOSPITAL | Age: 68
DRG: 291 | End: 2023-03-06
Payer: MEDICARE

## 2023-03-06 DIAGNOSIS — J90 PLEURAL EFFUSION, BILATERAL: ICD-10-CM

## 2023-03-06 DIAGNOSIS — D64.9 ANEMIA, UNSPECIFIED TYPE: ICD-10-CM

## 2023-03-06 DIAGNOSIS — N18.9 ACUTE RENAL FAILURE SUPERIMPOSED ON CHRONIC KIDNEY DISEASE, UNSPECIFIED CKD STAGE, UNSPECIFIED ACUTE RENAL FAILURE TYPE: ICD-10-CM

## 2023-03-06 DIAGNOSIS — N17.9 ACUTE RENAL FAILURE SUPERIMPOSED ON CHRONIC KIDNEY DISEASE, UNSPECIFIED CKD STAGE, UNSPECIFIED ACUTE RENAL FAILURE TYPE: ICD-10-CM

## 2023-03-06 DIAGNOSIS — N17.9 AKI (ACUTE KIDNEY INJURY): ICD-10-CM

## 2023-03-06 DIAGNOSIS — R06.02 SHORTNESS OF BREATH: Primary | ICD-10-CM

## 2023-03-06 LAB
ALBUMIN SERPL-MCNC: 3.7 G/DL (ref 3.5–5.2)
ALBUMIN/GLOB SERPL: 1.4 G/DL
ALP SERPL-CCNC: 85 U/L (ref 39–117)
ALT SERPL W P-5'-P-CCNC: 17 U/L (ref 1–41)
ANION GAP SERPL CALCULATED.3IONS-SCNC: 9 MMOL/L (ref 5–15)
AST SERPL-CCNC: 12 U/L (ref 1–40)
BASOPHILS # BLD AUTO: 0 10*3/MM3 (ref 0–0.2)
BASOPHILS NFR BLD AUTO: 0 % (ref 0–1.5)
BILIRUB SERPL-MCNC: 0.2 MG/DL (ref 0–1.2)
BUN SERPL-MCNC: 41 MG/DL (ref 8–23)
BUN/CREAT SERPL: 11.1 (ref 7–25)
CALCIUM SPEC-SCNC: 8.5 MG/DL (ref 8.6–10.5)
CHLORIDE SERPL-SCNC: 110 MMOL/L (ref 98–107)
CO2 SERPL-SCNC: 21 MMOL/L (ref 22–29)
CREAT SERPL-MCNC: 3.7 MG/DL (ref 0.76–1.27)
DEPRECATED RDW RBC AUTO: 43.9 FL (ref 37–54)
EGFRCR SERPLBLD CKD-EPI 2021: 17.2 ML/MIN/1.73
EOSINOPHIL # BLD AUTO: 0.13 10*3/MM3 (ref 0–0.4)
EOSINOPHIL NFR BLD AUTO: 2.7 % (ref 0.3–6.2)
ERYTHROCYTE [DISTWIDTH] IN BLOOD BY AUTOMATED COUNT: 14.2 % (ref 12.3–15.4)
GLOBULIN UR ELPH-MCNC: 2.7 GM/DL
GLUCOSE SERPL-MCNC: 208 MG/DL (ref 65–99)
HCT VFR BLD AUTO: 28.2 % (ref 37.5–51)
HGB BLD-MCNC: 8.5 G/DL (ref 13–17.7)
HOLD SPECIMEN: NORMAL
HOLD SPECIMEN: NORMAL
IMM GRANULOCYTES # BLD AUTO: 0.02 10*3/MM3 (ref 0–0.05)
IMM GRANULOCYTES NFR BLD AUTO: 0.4 % (ref 0–0.5)
LYMPHOCYTES # BLD AUTO: 0.65 10*3/MM3 (ref 0.7–3.1)
LYMPHOCYTES NFR BLD AUTO: 13.4 % (ref 19.6–45.3)
MCH RBC QN AUTO: 26 PG (ref 26.6–33)
MCHC RBC AUTO-ENTMCNC: 30.1 G/DL (ref 31.5–35.7)
MCV RBC AUTO: 86.2 FL (ref 79–97)
MONOCYTES # BLD AUTO: 0.5 10*3/MM3 (ref 0.1–0.9)
MONOCYTES NFR BLD AUTO: 10.3 % (ref 5–12)
NEUTROPHILS NFR BLD AUTO: 3.54 10*3/MM3 (ref 1.7–7)
NEUTROPHILS NFR BLD AUTO: 73.2 % (ref 42.7–76)
NRBC BLD AUTO-RTO: 0 /100 WBC (ref 0–0.2)
NT-PROBNP SERPL-MCNC: 6516 PG/ML (ref 0–900)
PLATELET # BLD AUTO: 233 10*3/MM3 (ref 140–450)
PMV BLD AUTO: 10 FL (ref 6–12)
POTASSIUM SERPL-SCNC: 4.3 MMOL/L (ref 3.5–5.2)
PROT SERPL-MCNC: 6.4 G/DL (ref 6–8.5)
QT INTERVAL: 421 MS
RBC # BLD AUTO: 3.27 10*6/MM3 (ref 4.14–5.8)
SODIUM SERPL-SCNC: 140 MMOL/L (ref 136–145)
TROPONIN T SERPL HS-MCNC: 101 NG/L
WBC NRBC COR # BLD: 4.84 10*3/MM3 (ref 3.4–10.8)
WHOLE BLOOD HOLD COAG: NORMAL
WHOLE BLOOD HOLD SPECIMEN: NORMAL

## 2023-03-06 PROCEDURE — 85025 COMPLETE CBC W/AUTO DIFF WBC: CPT

## 2023-03-06 PROCEDURE — 84484 ASSAY OF TROPONIN QUANT: CPT

## 2023-03-06 PROCEDURE — 99285 EMERGENCY DEPT VISIT HI MDM: CPT

## 2023-03-06 PROCEDURE — 71046 X-RAY EXAM CHEST 2 VIEWS: CPT

## 2023-03-06 PROCEDURE — 80053 COMPREHEN METABOLIC PANEL: CPT

## 2023-03-06 PROCEDURE — 84145 PROCALCITONIN (PCT): CPT | Performed by: PHYSICIAN ASSISTANT

## 2023-03-06 PROCEDURE — 93010 ELECTROCARDIOGRAM REPORT: CPT | Performed by: INTERNAL MEDICINE

## 2023-03-06 PROCEDURE — 83880 ASSAY OF NATRIURETIC PEPTIDE: CPT

## 2023-03-06 PROCEDURE — 93005 ELECTROCARDIOGRAM TRACING: CPT

## 2023-03-06 RX ORDER — SODIUM CHLORIDE 0.9 % (FLUSH) 0.9 %
10 SYRINGE (ML) INJECTION AS NEEDED
Status: DISCONTINUED | OUTPATIENT
Start: 2023-03-06 | End: 2023-03-10 | Stop reason: HOSPADM

## 2023-03-07 ENCOUNTER — APPOINTMENT (OUTPATIENT)
Dept: ULTRASOUND IMAGING | Facility: HOSPITAL | Age: 68
DRG: 291 | End: 2023-03-07
Payer: MEDICARE

## 2023-03-07 ENCOUNTER — APPOINTMENT (OUTPATIENT)
Dept: CT IMAGING | Facility: HOSPITAL | Age: 68
DRG: 291 | End: 2023-03-07
Payer: MEDICARE

## 2023-03-07 PROBLEM — R06.02 SHORTNESS OF BREATH: Status: ACTIVE | Noted: 2023-03-07

## 2023-03-07 PROBLEM — R06.09 DOE (DYSPNEA ON EXERTION): Status: ACTIVE | Noted: 2023-03-07

## 2023-03-07 PROBLEM — D63.8 ANEMIA, CHRONIC DISEASE: Status: ACTIVE | Noted: 2020-05-25

## 2023-03-07 LAB
ANION GAP SERPL CALCULATED.3IONS-SCNC: 9.3 MMOL/L (ref 5–15)
B PARAPERT DNA SPEC QL NAA+PROBE: NOT DETECTED
B PERT DNA SPEC QL NAA+PROBE: NOT DETECTED
BACTERIA UR QL AUTO: NORMAL /HPF
BILIRUB UR QL STRIP: NEGATIVE
BUN SERPL-MCNC: 44 MG/DL (ref 8–23)
BUN/CREAT SERPL: 12.4 (ref 7–25)
C PNEUM DNA NPH QL NAA+NON-PROBE: NOT DETECTED
CALCIUM SPEC-SCNC: 7.9 MG/DL (ref 8.6–10.5)
CHLORIDE SERPL-SCNC: 110 MMOL/L (ref 98–107)
CLARITY UR: CLEAR
CO2 SERPL-SCNC: 20.7 MMOL/L (ref 22–29)
COLOR UR: YELLOW
CREAT SERPL-MCNC: 3.55 MG/DL (ref 0.76–1.27)
CREAT UR-MCNC: 26.3 MG/DL
D-LACTATE SERPL-SCNC: 0.9 MMOL/L (ref 0.5–2)
DEPRECATED RDW RBC AUTO: 43.2 FL (ref 37–54)
EGFRCR SERPLBLD CKD-EPI 2021: 18 ML/MIN/1.73
ERYTHROCYTE [DISTWIDTH] IN BLOOD BY AUTOMATED COUNT: 14 % (ref 12.3–15.4)
FLUAV SUBTYP SPEC NAA+PROBE: NOT DETECTED
FLUBV RNA ISLT QL NAA+PROBE: NOT DETECTED
GEN 5 2HR TROPONIN T REFLEX: 93 NG/L
GLUCOSE BLDC GLUCOMTR-MCNC: 119 MG/DL (ref 70–130)
GLUCOSE BLDC GLUCOMTR-MCNC: 147 MG/DL (ref 70–130)
GLUCOSE BLDC GLUCOMTR-MCNC: 154 MG/DL (ref 70–130)
GLUCOSE BLDC GLUCOMTR-MCNC: 194 MG/DL (ref 70–130)
GLUCOSE SERPL-MCNC: 112 MG/DL (ref 65–99)
GLUCOSE UR STRIP-MCNC: NEGATIVE MG/DL
HADV DNA SPEC NAA+PROBE: NOT DETECTED
HBA1C MFR BLD: 6.6 % (ref 4.8–5.6)
HCOV 229E RNA SPEC QL NAA+PROBE: NOT DETECTED
HCOV HKU1 RNA SPEC QL NAA+PROBE: NOT DETECTED
HCOV NL63 RNA SPEC QL NAA+PROBE: NOT DETECTED
HCOV OC43 RNA SPEC QL NAA+PROBE: NOT DETECTED
HCT VFR BLD AUTO: 25.9 % (ref 37.5–51)
HGB BLD-MCNC: 7.8 G/DL (ref 13–17.7)
HGB UR QL STRIP.AUTO: NEGATIVE
HMPV RNA NPH QL NAA+NON-PROBE: NOT DETECTED
HPIV1 RNA ISLT QL NAA+PROBE: NOT DETECTED
HPIV2 RNA SPEC QL NAA+PROBE: NOT DETECTED
HPIV3 RNA NPH QL NAA+PROBE: NOT DETECTED
HPIV4 P GENE NPH QL NAA+PROBE: NOT DETECTED
HYALINE CASTS UR QL AUTO: NORMAL /LPF
KETONES UR QL STRIP: NEGATIVE
LDH SERPL-CCNC: 167 U/L (ref 135–225)
LEUKOCYTE ESTERASE UR QL STRIP.AUTO: NEGATIVE
M PNEUMO IGG SER IA-ACNC: NOT DETECTED
MCH RBC QN AUTO: 25.7 PG (ref 26.6–33)
MCHC RBC AUTO-ENTMCNC: 30.1 G/DL (ref 31.5–35.7)
MCV RBC AUTO: 85.5 FL (ref 79–97)
NITRITE UR QL STRIP: NEGATIVE
PH UR STRIP.AUTO: 5.5 [PH] (ref 5–8)
PLATELET # BLD AUTO: 194 10*3/MM3 (ref 140–450)
PMV BLD AUTO: 10.4 FL (ref 6–12)
POTASSIUM SERPL-SCNC: 4.6 MMOL/L (ref 3.5–5.2)
PROCALCITONIN SERPL-MCNC: 0.1 NG/ML (ref 0–0.25)
PROT ?TM UR-MCNC: 111.7 MG/DL
PROT UR QL STRIP: ABNORMAL
PROT/CREAT UR: 4247.1 MG/G CREA (ref 0–200)
QT INTERVAL: 453 MS
RBC # BLD AUTO: 3.03 10*6/MM3 (ref 4.14–5.8)
RBC # UR STRIP: NORMAL /HPF
REF LAB TEST METHOD: NORMAL
RHINOVIRUS RNA SPEC NAA+PROBE: NOT DETECTED
RSV RNA NPH QL NAA+NON-PROBE: NOT DETECTED
SARS-COV-2 RNA NPH QL NAA+NON-PROBE: NOT DETECTED
SODIUM SERPL-SCNC: 140 MMOL/L (ref 136–145)
SP GR UR STRIP: 1.01 (ref 1–1.03)
SQUAMOUS #/AREA URNS HPF: NORMAL /HPF
TROPONIN T DELTA: 5 NG/L
TROPONIN T SERPL HS-MCNC: 88 NG/L
TSH SERPL DL<=0.05 MIU/L-ACNC: 2.49 UIU/ML (ref 0.27–4.2)
UROBILINOGEN UR QL STRIP: ABNORMAL
WBC # UR STRIP: NORMAL /HPF
WBC NRBC COR # BLD: 5.02 10*3/MM3 (ref 3.4–10.8)

## 2023-03-07 PROCEDURE — 93010 ELECTROCARDIOGRAM REPORT: CPT | Performed by: INTERNAL MEDICINE

## 2023-03-07 PROCEDURE — 84443 ASSAY THYROID STIM HORMONE: CPT | Performed by: INTERNAL MEDICINE

## 2023-03-07 PROCEDURE — 80048 BASIC METABOLIC PNL TOTAL CA: CPT | Performed by: NURSE PRACTITIONER

## 2023-03-07 PROCEDURE — 94761 N-INVAS EAR/PLS OXIMETRY MLT: CPT

## 2023-03-07 PROCEDURE — 85027 COMPLETE CBC AUTOMATED: CPT | Performed by: NURSE PRACTITIONER

## 2023-03-07 PROCEDURE — 0202U NFCT DS 22 TRGT SARS-COV-2: CPT | Performed by: PHYSICIAN ASSISTANT

## 2023-03-07 PROCEDURE — 83615 LACTATE (LD) (LDH) ENZYME: CPT | Performed by: INTERNAL MEDICINE

## 2023-03-07 PROCEDURE — 99222 1ST HOSP IP/OBS MODERATE 55: CPT | Performed by: INTERNAL MEDICINE

## 2023-03-07 PROCEDURE — 25010000002 ENOXAPARIN PER 10 MG: Performed by: NURSE PRACTITIONER

## 2023-03-07 PROCEDURE — 83605 ASSAY OF LACTIC ACID: CPT | Performed by: PHYSICIAN ASSISTANT

## 2023-03-07 PROCEDURE — 84156 ASSAY OF PROTEIN URINE: CPT | Performed by: INTERNAL MEDICINE

## 2023-03-07 PROCEDURE — 63710000001 INSULIN LISPRO (HUMAN) PER 5 UNITS: Performed by: NURSE PRACTITIONER

## 2023-03-07 PROCEDURE — 82962 GLUCOSE BLOOD TEST: CPT

## 2023-03-07 PROCEDURE — G0378 HOSPITAL OBSERVATION PER HR: HCPCS

## 2023-03-07 PROCEDURE — 93005 ELECTROCARDIOGRAM TRACING: CPT | Performed by: NURSE PRACTITIONER

## 2023-03-07 PROCEDURE — 84484 ASSAY OF TROPONIN QUANT: CPT | Performed by: NURSE PRACTITIONER

## 2023-03-07 PROCEDURE — 81001 URINALYSIS AUTO W/SCOPE: CPT | Performed by: INTERNAL MEDICINE

## 2023-03-07 PROCEDURE — 94799 UNLISTED PULMONARY SVC/PX: CPT

## 2023-03-07 PROCEDURE — 76775 US EXAM ABDO BACK WALL LIM: CPT

## 2023-03-07 PROCEDURE — 71250 CT THORAX DX C-: CPT

## 2023-03-07 PROCEDURE — 82570 ASSAY OF URINE CREATININE: CPT | Performed by: INTERNAL MEDICINE

## 2023-03-07 PROCEDURE — 94640 AIRWAY INHALATION TREATMENT: CPT

## 2023-03-07 PROCEDURE — 83036 HEMOGLOBIN GLYCOSYLATED A1C: CPT | Performed by: NURSE PRACTITIONER

## 2023-03-07 RX ORDER — CARVEDILOL 3.12 MG/1
3.12 TABLET ORAL 2 TIMES DAILY WITH MEALS
Status: DISCONTINUED | OUTPATIENT
Start: 2023-03-07 | End: 2023-03-07

## 2023-03-07 RX ORDER — FAMOTIDINE 20 MG/1
10 TABLET, FILM COATED ORAL 2 TIMES DAILY
Status: DISCONTINUED | OUTPATIENT
Start: 2023-03-07 | End: 2023-03-10 | Stop reason: HOSPADM

## 2023-03-07 RX ORDER — LEVOTHYROXINE SODIUM 0.07 MG/1
75 TABLET ORAL
Status: DISCONTINUED | OUTPATIENT
Start: 2023-03-08 | End: 2023-03-10 | Stop reason: HOSPADM

## 2023-03-07 RX ORDER — POLYETHYLENE GLYCOL 3350 17 G/17G
17 POWDER, FOR SOLUTION ORAL DAILY PRN
Status: DISCONTINUED | OUTPATIENT
Start: 2023-03-07 | End: 2023-03-10 | Stop reason: HOSPADM

## 2023-03-07 RX ORDER — AMLODIPINE BESYLATE 10 MG/1
10 TABLET ORAL
Status: DISCONTINUED | OUTPATIENT
Start: 2023-03-07 | End: 2023-03-07

## 2023-03-07 RX ORDER — TAMSULOSIN HYDROCHLORIDE 0.4 MG/1
0.4 CAPSULE ORAL NIGHTLY
Status: DISCONTINUED | OUTPATIENT
Start: 2023-03-07 | End: 2023-03-10 | Stop reason: HOSPADM

## 2023-03-07 RX ORDER — ENOXAPARIN SODIUM 100 MG/ML
1 INJECTION SUBCUTANEOUS EVERY 24 HOURS
Status: DISCONTINUED | OUTPATIENT
Start: 2023-03-07 | End: 2023-03-07

## 2023-03-07 RX ORDER — HYDRALAZINE HYDROCHLORIDE 25 MG/1
25 TABLET, FILM COATED ORAL 3 TIMES DAILY
COMMUNITY
End: 2023-03-10 | Stop reason: HOSPADM

## 2023-03-07 RX ORDER — CARVEDILOL 3.12 MG/1
3.12 TABLET ORAL NIGHTLY
Status: DISCONTINUED | OUTPATIENT
Start: 2023-03-07 | End: 2023-03-10 | Stop reason: HOSPADM

## 2023-03-07 RX ORDER — INSULIN LISPRO 100 [IU]/ML
0-9 INJECTION, SOLUTION INTRAVENOUS; SUBCUTANEOUS
Status: DISCONTINUED | OUTPATIENT
Start: 2023-03-07 | End: 2023-03-10 | Stop reason: HOSPADM

## 2023-03-07 RX ORDER — ASPIRIN 81 MG/1
81 TABLET, CHEWABLE ORAL DAILY
Status: ON HOLD | COMMUNITY
End: 2023-03-09

## 2023-03-07 RX ORDER — ONDANSETRON 4 MG/1
4 TABLET, FILM COATED ORAL EVERY 6 HOURS PRN
Status: DISCONTINUED | OUTPATIENT
Start: 2023-03-07 | End: 2023-03-10 | Stop reason: HOSPADM

## 2023-03-07 RX ORDER — ATORVASTATIN CALCIUM 20 MG/1
40 TABLET, FILM COATED ORAL DAILY
Status: DISCONTINUED | OUTPATIENT
Start: 2023-03-07 | End: 2023-03-07

## 2023-03-07 RX ORDER — PANTOPRAZOLE SODIUM 40 MG/1
40 TABLET, DELAYED RELEASE ORAL DAILY
Status: DISCONTINUED | OUTPATIENT
Start: 2023-03-07 | End: 2023-03-09

## 2023-03-07 RX ORDER — IPRATROPIUM BROMIDE AND ALBUTEROL SULFATE 2.5; .5 MG/3ML; MG/3ML
3 SOLUTION RESPIRATORY (INHALATION) ONCE
Status: COMPLETED | OUTPATIENT
Start: 2023-03-07 | End: 2023-03-07

## 2023-03-07 RX ORDER — NICOTINE POLACRILEX 4 MG
15 LOZENGE BUCCAL
Status: DISCONTINUED | OUTPATIENT
Start: 2023-03-07 | End: 2023-03-10 | Stop reason: HOSPADM

## 2023-03-07 RX ORDER — BUMETANIDE 0.25 MG/ML
2 INJECTION INTRAMUSCULAR; INTRAVENOUS 2 TIMES DAILY
Status: COMPLETED | OUTPATIENT
Start: 2023-03-07 | End: 2023-03-08

## 2023-03-07 RX ORDER — SODIUM CHLORIDE 9 MG/ML
40 INJECTION, SOLUTION INTRAVENOUS AS NEEDED
Status: DISCONTINUED | OUTPATIENT
Start: 2023-03-07 | End: 2023-03-10 | Stop reason: HOSPADM

## 2023-03-07 RX ORDER — ONDANSETRON 2 MG/ML
4 INJECTION INTRAMUSCULAR; INTRAVENOUS EVERY 6 HOURS PRN
Status: DISCONTINUED | OUTPATIENT
Start: 2023-03-07 | End: 2023-03-10 | Stop reason: HOSPADM

## 2023-03-07 RX ORDER — ATORVASTATIN CALCIUM 80 MG/1
80 TABLET, FILM COATED ORAL NIGHTLY
Status: DISCONTINUED | OUTPATIENT
Start: 2023-03-07 | End: 2023-03-09

## 2023-03-07 RX ORDER — IBUPROFEN 600 MG/1
1 TABLET ORAL AS NEEDED
Status: DISCONTINUED | OUTPATIENT
Start: 2023-03-07 | End: 2023-03-10 | Stop reason: HOSPADM

## 2023-03-07 RX ORDER — ACETAMINOPHEN 160 MG/5ML
650 SOLUTION ORAL EVERY 4 HOURS PRN
Status: DISCONTINUED | OUTPATIENT
Start: 2023-03-07 | End: 2023-03-10 | Stop reason: HOSPADM

## 2023-03-07 RX ORDER — GUAIFENESIN 600 MG/1
600 TABLET, EXTENDED RELEASE ORAL EVERY 12 HOURS SCHEDULED
Status: DISCONTINUED | OUTPATIENT
Start: 2023-03-07 | End: 2023-03-10 | Stop reason: HOSPADM

## 2023-03-07 RX ORDER — NITROGLYCERIN 0.4 MG/1
0.4 TABLET SUBLINGUAL
Status: DISCONTINUED | OUTPATIENT
Start: 2023-03-07 | End: 2023-03-10 | Stop reason: HOSPADM

## 2023-03-07 RX ORDER — DEXTROSE MONOHYDRATE 25 G/50ML
25 INJECTION, SOLUTION INTRAVENOUS
Status: DISCONTINUED | OUTPATIENT
Start: 2023-03-07 | End: 2023-03-10 | Stop reason: HOSPADM

## 2023-03-07 RX ORDER — NICOTINE POLACRILEX 4 MG
15 LOZENGE BUCCAL
Status: CANCELLED | OUTPATIENT
Start: 2023-03-07

## 2023-03-07 RX ORDER — BUPROPION HYDROCHLORIDE 150 MG/1
150 TABLET ORAL DAILY
Status: DISCONTINUED | OUTPATIENT
Start: 2023-03-07 | End: 2023-03-10 | Stop reason: HOSPADM

## 2023-03-07 RX ORDER — DEXTROSE MONOHYDRATE 25 G/50ML
25 INJECTION, SOLUTION INTRAVENOUS
Status: CANCELLED | OUTPATIENT
Start: 2023-03-07

## 2023-03-07 RX ORDER — IBUPROFEN 600 MG/1
1 TABLET ORAL
Status: CANCELLED | OUTPATIENT
Start: 2023-03-07

## 2023-03-07 RX ORDER — ACETAMINOPHEN 325 MG/1
650 TABLET ORAL EVERY 4 HOURS PRN
Status: DISCONTINUED | OUTPATIENT
Start: 2023-03-07 | End: 2023-03-10 | Stop reason: HOSPADM

## 2023-03-07 RX ORDER — BUMETANIDE 0.25 MG/ML
2 INJECTION INTRAMUSCULAR; INTRAVENOUS ONCE
Status: COMPLETED | OUTPATIENT
Start: 2023-03-07 | End: 2023-03-07

## 2023-03-07 RX ORDER — IPRATROPIUM BROMIDE AND ALBUTEROL SULFATE 2.5; .5 MG/3ML; MG/3ML
3 SOLUTION RESPIRATORY (INHALATION)
Status: DISCONTINUED | OUTPATIENT
Start: 2023-03-07 | End: 2023-03-10 | Stop reason: HOSPADM

## 2023-03-07 RX ORDER — ROSUVASTATIN CALCIUM 20 MG/1
40 TABLET, COATED ORAL NIGHTLY
COMMUNITY
End: 2023-03-28 | Stop reason: ALTCHOICE

## 2023-03-07 RX ORDER — ASPIRIN 81 MG/1
81 TABLET, CHEWABLE ORAL DAILY
Status: DISCONTINUED | OUTPATIENT
Start: 2023-03-07 | End: 2023-03-09

## 2023-03-07 RX ORDER — SODIUM CHLORIDE 0.9 % (FLUSH) 0.9 %
10 SYRINGE (ML) INJECTION AS NEEDED
Status: DISCONTINUED | OUTPATIENT
Start: 2023-03-07 | End: 2023-03-10 | Stop reason: HOSPADM

## 2023-03-07 RX ORDER — FOLIC ACID 1 MG/1
1000 TABLET ORAL DAILY
Status: DISCONTINUED | OUTPATIENT
Start: 2023-03-07 | End: 2023-03-09

## 2023-03-07 RX ORDER — INSULIN LISPRO 100 [IU]/ML
0-9 INJECTION, SOLUTION INTRAVENOUS; SUBCUTANEOUS
Status: CANCELLED | OUTPATIENT
Start: 2023-03-07

## 2023-03-07 RX ORDER — ACETAMINOPHEN 650 MG/1
650 SUPPOSITORY RECTAL EVERY 4 HOURS PRN
Status: DISCONTINUED | OUTPATIENT
Start: 2023-03-07 | End: 2023-03-10 | Stop reason: HOSPADM

## 2023-03-07 RX ORDER — INSULIN LISPRO 100 [IU]/ML
0-7 INJECTION, SOLUTION INTRAVENOUS; SUBCUTANEOUS
Status: DISCONTINUED | OUTPATIENT
Start: 2023-03-07 | End: 2023-03-07

## 2023-03-07 RX ORDER — BUDESONIDE AND FORMOTEROL FUMARATE DIHYDRATE 160; 4.5 UG/1; UG/1
2 AEROSOL RESPIRATORY (INHALATION)
Status: DISCONTINUED | OUTPATIENT
Start: 2023-03-07 | End: 2023-03-10 | Stop reason: HOSPADM

## 2023-03-07 RX ORDER — PANTOPRAZOLE SODIUM 40 MG/1
40 TABLET, DELAYED RELEASE ORAL DAILY
Status: ON HOLD | COMMUNITY
End: 2023-03-09

## 2023-03-07 RX ORDER — CALCIUM CARBONATE 200(500)MG
2 TABLET,CHEWABLE ORAL 2 TIMES DAILY PRN
Status: DISCONTINUED | OUTPATIENT
Start: 2023-03-07 | End: 2023-03-10 | Stop reason: HOSPADM

## 2023-03-07 RX ORDER — MELATONIN
5000 DAILY
Status: DISCONTINUED | OUTPATIENT
Start: 2023-03-07 | End: 2023-03-09

## 2023-03-07 RX ORDER — SODIUM CHLORIDE 0.9 % (FLUSH) 0.9 %
10 SYRINGE (ML) INJECTION EVERY 12 HOURS SCHEDULED
Status: DISCONTINUED | OUTPATIENT
Start: 2023-03-07 | End: 2023-03-10 | Stop reason: HOSPADM

## 2023-03-07 RX ADMIN — ATORVASTATIN CALCIUM 40 MG: 20 TABLET, FILM COATED ORAL at 16:09

## 2023-03-07 RX ADMIN — Medication 10 ML: at 20:00

## 2023-03-07 RX ADMIN — IPRATROPIUM BROMIDE AND ALBUTEROL SULFATE 3 ML: .5; 3 SOLUTION RESPIRATORY (INHALATION) at 01:02

## 2023-03-07 RX ADMIN — BUPROPION HYDROCHLORIDE 150 MG: 150 TABLET, EXTENDED RELEASE ORAL at 20:37

## 2023-03-07 RX ADMIN — BUMETANIDE 2 MG: 0.25 INJECTION INTRAMUSCULAR; INTRAVENOUS at 04:49

## 2023-03-07 RX ADMIN — BUMETANIDE 2 MG: 0.25 INJECTION INTRAMUSCULAR; INTRAVENOUS at 23:24

## 2023-03-07 RX ADMIN — BUMETANIDE 2 MG: 0.25 INJECTION INTRAMUSCULAR; INTRAVENOUS at 11:47

## 2023-03-07 RX ADMIN — Medication 10 ML: at 09:24

## 2023-03-07 RX ADMIN — IPRATROPIUM BROMIDE AND ALBUTEROL SULFATE 3 ML: .5; 3 SOLUTION RESPIRATORY (INHALATION) at 19:56

## 2023-03-07 RX ADMIN — INSULIN LISPRO 2 UNITS: 100 INJECTION, SOLUTION INTRAVENOUS; SUBCUTANEOUS at 11:50

## 2023-03-07 RX ADMIN — GUAIFENESIN 600 MG: 600 TABLET, EXTENDED RELEASE ORAL at 20:37

## 2023-03-07 RX ADMIN — INSULIN LISPRO 2 UNITS: 100 INJECTION, SOLUTION INTRAVENOUS; SUBCUTANEOUS at 17:29

## 2023-03-07 RX ADMIN — FAMOTIDINE 10 MG: 20 TABLET, FILM COATED ORAL at 20:37

## 2023-03-07 NOTE — PROGRESS NOTES
"[facility] Clinical Pharmacy Services: Enoxaparin Consult    Carlito Mo has a pharmacy consult to dose full-dose enoxaparin per JI Gonzalez's request.     Indication: A Fib - requiring full anticoagulation  Home Anticoagulation: Eliquis 5mg bid     Relevant clinical data and objective history reviewed:  67 y.o. male 182.9 cm (72\") 74.6 kg (164 lb 7.4 oz)   Body mass index is 22.31 kg/m².   Results from last 7 days   Lab Units 03/07/23  0545   PLATELETS 10*3/mm3 194     Estimated Creatinine Clearance: 21.3 mL/min (A) (by C-G formula based on SCr of 3.55 mg/dL (H)).    Assessment/Plan    Will start patient on  70 (1mg/kg) subcutaneous every 24 hours, adjusted for renal function. Consult order will be discontinued but pharmacy will continue to follow.     Ivana Avelar, PharmD  Clinical Pharmacist    "

## 2023-03-07 NOTE — CONSULTS
Date of Consultation: 23    Referral Provider: Eliseo Gross MD     Reason for Consultation: CHF.    Encounter Provider: Mauricio Corona MD    Group of Service: [facility]     Patient Name: Carlito Mo    :1955    Chief complaint:  Shortness of breath     History of Present Illness:      This is a very pleasant 67 year-old male who normally follows with Dr. Murguia in our practice.  He has a history of coronary artery disease status post CABG in .  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    His ejection fraction has varied over the years, and an EF of 30 to 35% was noted in May 2020.  However, his most recent echocardiogram on 2022 showed an ejection fraction of 56%.    He presented to the emergency department on 3/6/2023 with shortness of breath.  He was having difficulty walking at a restaurant and had to stop and catch his breath.  EMS was called, and he was brought to the emergency department.  In the emergency department, a CT scan showed a large right and small left pleural effusion.  His creatinine was also increased from baseline at 3.7 (previously 2.8).  He also was noted to be more anemic than his baseline as well.  Dr. Montelongo saw the patient from nephrology, and is diuresing him currently.  He has not had any anginal chest pain.    ECHO 22  • Calculated left ventricular 3D EF = 56% Left ventricular systolic function is normal.  • Saline test results are negative.  • Left ventricular diastolic function was normal.  • Left atrial volume is moderately increased.  • Estimated right ventricular systolic pressure from tricuspid regurgitation is normal (<35 mmHg    Stress test 19    • Myocardial perfusion imaging indicates a medium-sized infarct located in the apex/periapical areas with no significant  ischemia noted.  • Left ventricular ejection fraction is mildly reduced (Calculated EF = 44%).         Past Medical History:   Diagnosis Date   • Acquired hypothyroidism    • Acute congestive heart failure (HCC)    • Acute respiratory failure with hypoxia (HCC)    • Acute sinusitis    • SYLVAIN (acute kidney injury) (HCC)     on CKD   • Anemia    • Arthritis    • CAD (coronary artery disease)    • Cancer (HCC)     skin   • Chronic combined systolic and diastolic congestive heart failure (HCC)    • Chronic ischemic heart disease    • Chronic kidney disease (CKD)    • CKD (chronic kidney disease)    • COPD (chronic obstructive pulmonary disease) (HCC)    • Depression    • Diabetes mellitus type 2, uncontrolled, without complications    • Diabetic neuropathy (HCC)    • Disease of thyroid gland    • Elevated cholesterol    • Fatigue    • Frequent PVCs    • Generalized weakness    • GERD (gastroesophageal reflux disease)    • Heart attack (HCC)    • History of coronary artery disease     with remote history of bypass surgery in 2001   • Hyperlipidemia    • Hypertension    • Hypertensive encephalopathy    • Mental status change     Acute   • NO DEVICE/RECALLED    • Pleural effusion    • Pneumonia    • Poor historian    • RBBB (right bundle branch block)    • Stroke (HCC)     POOR VISION   • TIA (transient ischemic attack)    • Type 2 diabetes mellitus (HCC)    • Urinary retention    • Vitamin D deficiency          Past Surgical History:   Procedure Laterality Date   • APPENDECTOMY N/A 02/14/2016    Dr. Alexey Dodson   • ARTERIOVENOUS FISTULA/SHUNT SURGERY Right 6/3/2022    Procedure: RIGHT FOREARM ARTERIOVENOUS FISTULA PLACEMENT RADIOCEPHALIC WITH CEPHALIC VEIN TRANSPOSITION;  Surgeon: Eliseo Willis MD;  Location: [facility];  Service: Vascular;  Laterality: Right;   • COLONOSCOPY      over 20 years ago.  no polyps    • COLONOSCOPY N/A 9/18/2018    Procedure: COLONOSCOPY;  Surgeon: Jose Enrique Louie MD;   Location:  [facility];  Service: Gastroenterology   • COLONOSCOPY N/A 9/19/2018    IH, EH, polyps (TAs w/low grade dysplasia)   • COLONOSCOPY N/A 6/1/2020    Procedure: COLONOSCOPY;  Surgeon: Jose Enrique Louie MD;  Location: [facility] END[facility];  Service: Gastroenterology;  Laterality: N/A;  Pre op-Anemia, Melena, History of Polyps  Post op-To Cecum/TI, Polyp, Poor Prep   • CORONARY ARTERY BYPASS GRAFT  11/2001   • ENDOSCOPY N/A 5/29/2020    Procedure: ESOPHAGOGASTRODUODENOSCOPY with biopsies;  Surgeon: Amanda Lovelace MD;  Location: [facility] END[facility]COPY;  Service: Gastroenterology;  Laterality: N/A;  pre-anemia, dark stools  post-esophagitis, hiatal hernia   • ENDOSCOPY N/A 9/15/2020    Procedure: ESOPHAGOGASTRODUODENOSCOPY WITH BIOPSIES;  Surgeon: Jose Enrique Louie MD;  Location: [facility] ENDOSCOPY;  Service: Gastroenterology;  Laterality: N/A;  pre: history of melena and esophagitis  post: mild esophagitis and gastritis, small hiatal hernia   • HERNIA REPAIR Left 12/05/2019   • TONSILLECTOMY     • VASECTOMY           Allergies   Allergen Reactions   • Prozac [Fluoxetine Hcl] Other (See Comments)     shaking         No current facility-administered medications on file prior to encounter.     Current Outpatient Medications on File Prior to Encounter   Medication Sig Dispense Refill   • acetaminophen (TYLENOL) 325 MG tablet Take 2 tablets by mouth Every 4 (Four) Hours As Needed for Mild Pain.     • amLODIPine (NORVASC) 5 MG tablet Take 1 tablet by mouth Daily. (Patient taking differently: Take 2 tablets by mouth Daily.)     • apixaban (ELIQUIS) 5 MG tablet tablet Take 1 tablet by mouth Every 12 (Twelve) Hours. Indications: Atrial Fibrillation (Patient taking differently: Take 2.5 mg by mouth Every 12 (Twelve) Hours. Indications: Atrial Fibrillation) 60 tablet    • aspirin 81 MG chewable tablet Chew 1 tablet Daily.     • buPROPion XL (WELLBUTRIN XL) 150 MG 24 hr tablet Take 2 tablets by mouth Daily.     •  carvedilol (COREG) 3.125 MG tablet Take 1 tablet by mouth Every Night.     • Cholecalciferol (VITAMIN D3) 5000 units capsule capsule Take 1 capsule by mouth Daily.     • famotidine (PEPCID) 10 MG tablet Take 1 tablet by mouth 2 (Two) Times a Day.     • levothyroxine (SYNTHROID, LEVOTHROID) 75 MCG tablet Take 1 tablet by mouth Daily. 30 tablet 5   • pantoprazole (PROTONIX) 40 MG EC tablet Take 1 tablet by mouth Daily.     • tamsulosin (FLOMAX) 0.4 MG capsule 24 hr capsule Take 1 capsule by mouth Every Night.     • torsemide (DEMADEX) 10 MG tablet 4 tablets Daily.     • vitamin C (ASCORBIC ACID) 250 MG tablet Take 4 tablets by mouth Daily.     • atorvastatin (LIPITOR) 80 MG tablet Take 1 tablet by mouth Every Night. 90 tablet    • folic acid (FOLVITE) 1 MG tablet Take 1,000 mcg by mouth Daily. (Patient not taking: Reported on 3/7/2023)     • furosemide (LASIX) 40 MG tablet Take 40 mg by mouth 2 (Two) Times a Day. (Patient not taking: Reported on 3/7/2023)     • Insulin Glargine (BASAGLAR KWIKPEN) 100 UNIT/ML injection pen Inject 4 Units under the skin into the appropriate area as directed Every Night. Patient says he does not take consistently (Patient taking differently: Inject 4 Units under the skin into the appropriate area as directed Every Night.) 15 mL 3   • insulin glulisine (Apidra) 100 UNIT/ML injection Inject 2 Units under the skin into the appropriate area as directed 3 (Three) Times a Day Before Meals. Patient says he does not take consistently (Patient taking differently: Inject 2 Units under the skin into the appropriate area as directed 3 (Three) Times a Day Before Meals. HAS NOT STARTED TAKING YET, WAITING FOR SUPPLIES) 15 mL 3   • insulin lispro (ADMELOG) 100 UNIT/ML injection Inject 0-7 Units under the skin into the appropriate area as directed 3 (Three) Times a Day Before Meals.  12   • lisinopril (PRINIVIL,ZESTRIL) 20 MG tablet Take 20 mg by mouth Daily. (Patient not taking: Reported on 3/7/2023)    "  • polyethylene glycol (polyethylene glycol) 17 g packet Take 17 g by mouth Daily As Needed (constipation).           Social History     Socioeconomic History   • Marital status:      Spouse name: Lissette   • Number of children: 3   • Years of education: college   Tobacco Use   • Smoking status: Never   • Smokeless tobacco: Never   • Tobacco comments:     daily caffeine   Vaping Use   • Vaping Use: Never used   Substance and Sexual Activity   • Alcohol use: No     Comment: last drink 2 months ago   • Drug use: No   • Sexual activity: Defer         Family History   Problem Relation Age of Onset   • Diabetes Mother    • Hypertension Mother    • Macular degeneration Mother    • Alcohol abuse Father    • Cancer Father         lung,  age 65   • Heart disease Father    • Alcohol abuse Brother    • Cirrhosis Brother          age 50   • Liver cancer Brother 45   • Diabetes Son    • Kidney failure Son    • Autism Son    • Malig Hyperthermia Neg Hx        REVIEW OF SYSTEMS:   12 point ROS was performed and is negative except as outlined in HPI    REVIEW OF SYSTEMS:   CONSTITUTIONAL: No weight loss, fever, chills.  HEENT: No visual changes or hearing changes.  SKIN: No rash.   RESPIRATORY: No cough or hemoptysis.  GASTROINTESTINAL: No abdominal pain, melena, nausea, or vomiting.  GENITOURINARY: No dysuria or frequency.  NEUROLOGICAL: No numbness or tingling in the extremities.   MUSCULOSKELETAL: No new joint pain.    HEMATOLOGIC: No bleeding or bruising.   LYMPHATICS: No enlarged nodes.   PSYCHIATRIC: No severe depression or anxiety.    ENDOCRINOLOGIC: No heat or cold intolerance.       Objective:     Vitals:    23 1147 23 1302 23 1306 23 1309   BP: 139/73  149/75    Pulse: 74 74     Resp:  18     Temp:    97.5 °F (36.4 °C)   TempSrc:    Oral   SpO2:  95%     Weight:  74.6 kg (164 lb 7.4 oz)     Height:  182.9 cm (72\")       Body mass index is 22.31 kg/m².  Flowsheet Rows  " "  Flowsheet Row First Filed Value   Admission Height 182.9 cm (72\") Documented at 03/06/2023 2020   Admission Weight 81.6 kg (180 lb) Documented at 03/06/2023 2020           General:    No acute distress, alert and oriented x4, pleasant, chronically ill-appearing                   Head:    Normocephalic, atraumatic.   Eyes:          Conjunctivae and sclerae normal, no icterus, PERRLA   Throat:   No oral lesions, no thrush, oral mucosa moist.    Neck:   Supple, trachea midline.   Lungs:    Bibasilar rales    Heart:    Regular rhythm and normal rate.  No murmurs, gallops, or rubs noted.   Abdomen:     Soft, non-tender, non-distended, positive bowel sounds.    Extremities:  Trace to 1+ edema of the lower extremities bilaterally.   Pulses:   Pulses palpable and equal bilaterally.    Skin:   No bleeding or rash.   Neuro:   Non-focal.  Moves all extremities well.    Psychiatric:   Normal mood and affect.         Lab Review:                Results from last 7 days   Lab Units 03/07/23  0545   SODIUM mmol/L 140   POTASSIUM mmol/L 4.6   CHLORIDE mmol/L 110*   CO2 mmol/L 20.7*   BUN mg/dL 44*   CREATININE mg/dL 3.55*   GLUCOSE mg/dL 112*   CALCIUM mg/dL 7.9*     Results from last 7 days   Lab Units 03/07/23  1156 03/07/23  0545 03/06/23  2139   HSTROP T ng/L 93* 88* 101*     Results from last 7 days   Lab Units 03/07/23  0545   WBC 10*3/mm3 5.02   HEMOGLOBIN g/dL 7.8*   HEMATOCRIT % 25.9*   PLATELETS 10*3/mm3 194                                   EKG (reviewed by me personally):      Assessment:   1.  Acute on chronic diastolic CHF  2.  History of cardiomyopathy with varying ejection fractions over the years  3.  Acute kidney injury with stage IV chronic kidney disease  4.  Coronary artery disease, status post CABG in 2001  5.  Anemia of chronic disease  6.  Large right pleural effusion  7.  Hypertension  8.  History of embolic CVA, on Eliquis  9.  Diabetes    Plan:       Again, the patient was seen by Dr. Montelongo from " nephrology earlier today.  He is diuresing him with IV Bumex.  I will defer diuretics to nephrology.  Lisinopril is being held because of his renal dysfunction.  I am going to resume his carvedilol at this time.  It appears he only takes this at night, although I will put him on it twice a day for now.  I will hold the amlodipine and see how he does.    He has had varying ejection fractions over the years.  His most recent ejection fraction was 56%.  However, given his decompensation and worsening renal function, I feel another echocardiogram is indicated at this time.  His high-sensitivity troponin is elevated secondary to his kidney dysfunction and is not consistent with ACS.  I agree that he very likely will need a right thoracentesis while he is here.  I will see how he responds to diuresis first and potentially do this within the next several days.  He is on Lovenox in place of Eliquis currently.    Thank you very much for this consult.    Jonatan Corona MD

## 2023-03-07 NOTE — ED NOTES
Nursing report ED to floor  Carlito Mo  67 y.o.  male    HPI :   Chief Complaint   Patient presents with    Shortness of Breath       Admitting doctor:   Smith Henson MD    Admitting diagnosis:   The primary encounter diagnosis was Shortness of breath. Diagnoses of Pleural effusion, bilateral, Acute renal failure superimposed on chronic kidney disease, unspecified CKD stage, unspecified acute renal failure type (HCC), and Anemia, unspecified type were also pertinent to this visit.    Code status:   Current Code Status       Date Active Code Status Order ID Comments User Context       3/7/2023 0408 CPR (Attempt to Resuscitate) [id number]  Amber Agudelo APRN ED        Question Answer    Code Status (Patient has no pulse and is not breathing) CPR (Attempt to Resuscitate)    Medical Interventions (Patient has pulse or is breathing) Full Support                    Allergies:   Prozac [fluoxetine hcl]    Isolation:   No active isolations    Intake and Output  No intake or output data in the 24 hours ending 03/07/23 1127    Weight:       03/06/23 2020   Weight: 81.6 kg (180 lb)       Most recent vitals:   Vitals:    03/07/23 0201 03/07/23 0211 03/07/23 0301 03/07/23 0929   BP: 128/74  129/73 147/73   Pulse: 69 70 70 76   Resp:    19   Temp:       TempSrc:       SpO2: 91% 93% 94% 95%   Weight:       Height:           Active LDAs/IV Access:   Lines, Drains & Airways       Active LDAs       Name Placement date Placement time Site Days    Peripheral IV 03/06/23 2021 Left Antecubital 03/06/23 2021  Antecubital  less than 1                    Labs (abnormal labs have a star):   Labs Reviewed   COMPREHENSIVE METABOLIC PANEL - Abnormal; Notable for the following components:       Result Value    Glucose 208 (*)     BUN 41 (*)     Creatinine 3.70 (*)     Chloride 110 (*)     CO2 21.0 (*)     Calcium 8.5 (*)     eGFR 17.2 (*)     All other components within normal limits    Narrative:     GFR Normal >60  Chronic  Kidney Disease <60  Kidney Failure <15     BNP (IN-HOUSE) - Abnormal; Notable for the following components:    proBNP 6,516.0 (*)     All other components within normal limits    Narrative:     Among patients with dyspnea, NT-proBNP is highly sensitive for the detection of acute congestive heart failure. In addition NT-proBNP of <300 pg/ml effectively rules out acute congestive heart failure with 99% negative predictive value.    Results may be falsely decreased if patient taking Biotin.     SINGLE HSTROPONIN T - Abnormal; Notable for the following components:    HS Troponin T 101 (*)     All other components within normal limits    Narrative:     High Sensitive Troponin T Reference Range:  <10.0 ng/L- Negative Female for AMI  <15.0 ng/L- Negative Male for AMI  >=10 - Abnormal Female indicating possible myocardial injury.  >=15 - Abnormal Male indicating possible myocardial injury.   Clinicians would have to utilize clinical acumen, EKG, Troponin, and serial changes to determine if it is an Acute Myocardial Infarction or myocardial injury due to an underlying chronic condition.        CBC WITH AUTO DIFFERENTIAL - Abnormal; Notable for the following components:    RBC 3.27 (*)     Hemoglobin 8.5 (*)     Hematocrit 28.2 (*)     MCH 26.0 (*)     MCHC 30.1 (*)     Lymphocyte % 13.4 (*)     Lymphocytes, Absolute 0.65 (*)     All other components within normal limits   BASIC METABOLIC PANEL - Abnormal; Notable for the following components:    Glucose 112 (*)     BUN 44 (*)     Creatinine 3.55 (*)     Chloride 110 (*)     CO2 20.7 (*)     Calcium 7.9 (*)     eGFR 18.0 (*)     All other components within normal limits    Narrative:     GFR Normal >60  Chronic Kidney Disease <60  Kidney Failure <15     CBC (NO DIFF) - Abnormal; Notable for the following components:    RBC 3.03 (*)     Hemoglobin 7.8 (*)     Hematocrit 25.9 (*)     MCH 25.7 (*)     MCHC 30.1 (*)     All other components within normal limits   HEMOGLOBIN A1C -  Abnormal; Notable for the following components:    Hemoglobin A1C 6.60 (*)     All other components within normal limits    Narrative:     Hemoglobin A1C Ranges:    Increased Risk for Diabetes  5.7% to 6.4%  Diabetes                     >= 6.5%  Diabetic Goal                < 7.0%   TROPONIN - Abnormal; Notable for the following components:    HS Troponin T 88 (*)     All other components within normal limits    Narrative:     High Sensitive Troponin T Reference Range:  <10.0 ng/L- Negative Female for AMI  <15.0 ng/L- Negative Male for AMI  >=10 - Abnormal Female indicating possible myocardial injury.  >=15 - Abnormal Male indicating possible myocardial injury.   Clinicians would have to utilize clinical acumen, EKG, Troponin, and serial changes to determine if it is an Acute Myocardial Infarction or myocardial injury due to an underlying chronic condition.        POCT GLUCOSE FINGERSTICK - Abnormal; Notable for the following components:    Glucose 194 (*)     All other components within normal limits   RESPIRATORY PANEL PCR W/ COVID-19 (SARS-COV-2) SUKUMAR/TERRELL/RAISSA/PAD/COR/[facility]NALDO IN-HOUSE, NP SWAB IN [facility], 3-4 HR TAT - Normal    Narrative:     In the setting of a positive respiratory panel with a viral infection PLUS a negative procalcitonin without other underlying concern for bacterial infection, consider observing off antibiotics or discontinuation of antibiotics and continue supportive care. If the respiratory panel is positive for atypical bacterial infection (Bordetella pertussis, Chlamydophila pneumoniae, or Mycoplasma pneumoniae), consider antibiotic de-escalation to target atypical bacterial infection.   PROCALCITONIN - Normal    Narrative:     As a Marker for Sepsis (Non-Neonates):    1. <0.5 ng/mL represents a low risk of severe sepsis and/or septic shock.  2. >2 ng/mL represents a high risk of severe sepsis and/or septic shock.    As a Marker for Lower Respiratory Tract Infections that require antibiotic  "therapy:    PCT on Admission    Antibiotic Therapy       6-12 Hrs later    >0.5                Strongly Recommended  >0.25 - <0.5        Recommended   0.1 - 0.25          Discouraged              Remeasure/reassess PCT  <0.1                Strongly Discouraged     Remeasure/reassess PCT    As 28 day mortality risk marker: \"Change in Procalcitonin Result\" (>80% or <=80%) if Day 0 (or Day 1) and Day 4 values are available. Refer to [email or web address]://[email or web address]-[email or web address]    Change in PCT <=80%  A decrease of PCT levels below or equal to 80% defines a positive change in PCT test result representing a higher risk for 28-day all-cause mortality of patients diagnosed with severe sepsis for septic shock.    Change in PCT >80%  A decrease of PCT levels of more than 80% defines a negative change in PCT result representing a lower risk for 28-day all-cause mortality of patients diagnosed with severe sepsis or septic shock.      LACTIC ACID, PLASMA - Normal   POCT GLUCOSE FINGERSTICK - Normal   RAINBOW DRAW    Narrative:     The following orders were created for panel order [place] Draw.  Procedure                               Abnormality         Status                     ---------                               -----------         ------                     Green Top (Gel)[[id number]]                                  Final result               Lavender Top[[id number]]                                     Final result               Gold Top - SST[[id number]]                                   Final result               Light Blue Top[[id number]]                                   Final result                 Please view results for these tests on the individual orders.   URINALYSIS W/ MICROSCOPIC IF INDICATED (NO CULTURE)   PROTEIN / CREATININE RATIO, URINE   HIGH SENSITIVITIY TROPONIN T 2HR   POCT GLUCOSE FINGERSTICK   POCT GLUCOSE FINGERSTICK   POCT GLUCOSE FINGERSTICK   POCT GLUCOSE FINGERSTICK   POCT GLUCOSE FINGERSTICK   CBC AND " DIFFERENTIAL    Narrative:     The following orders were created for panel order CBC & Differential.  Procedure                               Abnormality         Status                     ---------                               -----------         ------                     CBC Auto Differential[[id number]]        Abnormal            Final result                 Please view results for these tests on the individual orders.   GREEN TOP   LAVENDER TOP   GOLD TOP - SST   LIGHT BLUE TOP       EKG:   ECG 12 Lead ED Triage Standing Order; SOA   Final Result   HEART RATE= 70  bpm   RR Interval= 857  ms   IA Interval=   ms   P Horizontal Axis=   deg   P Front Axis=   deg   QRSD Interval= 172  ms   QT Interval= 421  ms   QRS Axis= -78  deg   T Wave Axis= 62  deg   - ABNORMAL ECG -   Proable sinus rhythm vs Accelerated Junctional Rhythm   Right bundle branch block   No significant change compared to prior ekg   Electronically Signed By: Seth Gerber ([facility]) 06-Mar-2023 22:35:03   Date and Time of Study: 2023-03-06 20:30:23          Meds given in ED:   Medications   sodium chloride 0.9 % flush 10 mL (has no administration in time range)   sodium chloride 0.9 % flush 10 mL (10 mL Intravenous Given 3/7/23 0924)   sodium chloride 0.9 % flush 10 mL (has no administration in time range)   sodium chloride 0.9 % infusion 40 mL (has no administration in time range)   nitroglycerin (NITROSTAT) SL tablet 0.4 mg (has no administration in time range)   acetaminophen (TYLENOL) tablet 650 mg (has no administration in time range)     Or   acetaminophen (TYLENOL) 160 MG/5ML solution 650 mg (has no administration in time range)     Or   acetaminophen (TYLENOL) suppository 650 mg (has no administration in time range)   ondansetron (ZOFRAN) tablet 4 mg (has no administration in time range)     Or   ondansetron (ZOFRAN) injection 4 mg (has no administration in time range)   calcium carbonate (TUMS) chewable tablet 500 mg (200 mg elemental) (has  no administration in time range)   dextrose (GLUTOSE) oral gel 15 g (has no administration in time range)   dextrose (D50W) (25 g/50 mL) IV injection 25 g (has no administration in time range)   glucagon (GLUCAGEN) injection 1 mg (has no administration in time range)   insulin lispro (ADMELOG) injection 0-9 Units (0 Units Subcutaneous Not Given 3/7/23 0910)   bumetanide (BUMEX) injection 2 mg (has no administration in time range)   ipratropium-albuterol (DUO-NEB) nebulizer solution 3 mL (3 mL Nebulization Given 3/7/23 0102)   bumetanide (BUMEX) injection 2 mg (2 mg Intravenous Given 3/7/23 1233)       Imaging results:  XR Chest 2 View    Result Date: 3/6/2023   1. Increasing consolidation at the right lung base. Some of this may be related to atelectasis. Pneumonia is not excluded. 2. Cardiomegaly with vascular congestion.  This report was finalized on 3/6/2023 10:01 PM by Dr. Alice Allen M.D.      CT Chest Without Contrast Diagnostic    Result Date: 3/7/2023   1. Large right pleural effusion and small left pleural effusion. Consolidation at the right lung base is favored to be secondary to atelectasis. However, follow-up exam is suggested. There is also some milder left basilar atelectasis.  Radiation dose reduction techniques were utilized, including automated exposure control and exposure modulation based on body size.  This report was finalized on 3/7/2023 1:04 AM by Dr. Alice Allen M.D.       Ambulatory status:   - assist x 1    Social issues:   Social History     Socioeconomic History    Marital status:      Spouse name: Lissette    Number of children: 3    Years of education: college   Tobacco Use    Smoking status: Never    Smokeless tobacco: Never    Tobacco comments:     daily caffeine   Vaping Use    Vaping Use: Never used   Substance and Sexual Activity    Alcohol use: No     Comment: last drink 2 months ago    Drug use: No    Sexual activity: Defer       NIH Stroke Scale:         Louise  DEMAR Lin  03/07/23 11:27 EST

## 2023-03-07 NOTE — ED TRIAGE NOTES
Pt was brought in by ems from home for soa. soa became worse around 1700 today. Pt was given 324mg asa and 1sl ntiro.    This RN wore mask and goggles during time of contact

## 2023-03-07 NOTE — ED PROVIDER NOTES
EMERGENCY DEPARTMENT ENCOUNTER    Room Number:  39/39  Date seen:  3/7/2023  PCP: Florencia Caballero MD      HPI:  Chief Complaint: Shortness of breath  A complete HPI/ROS/PMH/PSH/SH/FH are unobtainable due to: Nothing  Context: Carlito Mo is a 67 y.o. male who presents to the ED c/o shortness of breath.  Patient states earlier today he began experiencing shortness of breath as his family went out to eat.  Patient states as he was at dinner he got up to go to the restroom but he had a lot of trouble catching his breath.  On his way home from dinner his family noticed he was still short of breath so decided to call EMS. He states when he tries to walk up the stairs it makes it worse.  Patient denies fever, chills, nausea, vomiting, swelling in lower extremities.  Patient admits to shortness of breath, chest pain.  States he uses oxygen at home at night about 2 times per week on average.          PAST MEDICAL HISTORY  Active Ambulatory Problems     Diagnosis Date Noted   • Major depressive disorder with single episode, in partial remission ([facility])    • Other hyperlipidemia    • Type 2 diabetes mellitus with stage 5 chronic kidney disease not on chronic dialysis, with long-term current use of insulin ([facility])    • Vitamin D deficiency 12/17/2015   • Erectile dysfunction 12/17/2015   • Chronic fatigue 04/25/2016   • Type 2 diabetes mellitus with ophthalmic complication ([facility]) 04/25/2016   • Skin lesion of chest wall 06/20/2016   • Slow transit constipation 09/01/2016   • Benign prostatic hyperplasia with nocturia 12/20/2016   • History of basal cell carcinoma 12/20/2016   • High blood pressure associated with diabetes ([facility]) 12/20/2016   • Acquired hypothyroidism 12/20/2017   • Hypertensive urgency 02/16/2018   • Recurrent strokes ([facility]) 02/20/2018   • Noncompliance w/medication treatment due to intermit use of medication 05/04/2018   • Urinary retention 09/20/2018   • Pneumonia 09/21/2018   • Acute on chronic combined  systolic and diastolic congestive heart failure (HCC) 09/21/2018   • Acute respiratory failure with hypoxia ([facility]) 09/21/2018   • Suspected sleep apnea 10/29/2018   • Pleural effusion 12/01/2018   • YAW (obstructive sleep apnea) 12/13/2018   • Coronary artery disease involving native coronary artery of native heart without angina pectoris 04/18/2019   • Chronically elevated troponin 07/02/2019   • Colon cancer screening 10/30/2018   • Enlarged lymph nodes 10/30/2018   • Functional gait abnormality 10/30/2018   • History of myocardial infarction 02/06/2019   • Hospital discharge follow-up 05/31/2019   • Insomnia 02/06/2019   • Iron deficiency anemia 10/02/2018   • Major depression, recurrent ([facility]) 10/16/2012   • Anemia, chronic renal failure, stage 3 (moderate) ([facility]) 02/27/2020   • Essential hypertension 03/10/2020   • Adverse effect of iron and its compounds, initial encounter 05/23/2020   • Acute on chronic renal insufficiency 05/25/2020   • Debility 05/25/2020   • Falls frequently 05/25/2020   • Acute pain of right shoulder 05/27/2020   • Acute on chronic anemia 05/25/2020   • Heme positive stool 05/25/2020   • Neurogenic orthostatic hypotension (HCC) 05/30/2020   • Symptomatic anemia 05/25/2020   • History of colon polyps 05/25/2020   • Helicobacter pylori gastritis 06/04/2020   • Esophagitis 06/10/2020   • Sleep related hypoxia 07/23/2020   • Embolic stroke ([facility]) 05/20/2021   • Patent foramen ovale 05/22/2021   • Pleural effusion, bilateral 05/22/2021   • Cardiomyopathy ([facility]) 05/24/2021   • Lower abdominal pain 12/13/2021   • Diarrhea 12/14/2021   • CKD (chronic kidney disease) stage 4, GFR 15-29 ml/min ([facility]) 12/16/2021   • Hypertension not at goal 02/02/2022   • Memory loss due to medical condition 02/08/2022   • Generalized weakness 03/01/2022   • ERRONEOUS ENCOUNTER--DISREGARD 03/09/2022   • Weakness 06/03/2022   • Paroxysmal atrial fibrillation (HCC) 07/25/2022   • Chronic anticoagulation 07/25/2022   •  Multiple falls 09/15/2022   • Dehydration 09/16/2022   • SYLVAIN (acute kidney injury) (HCC) 09/16/2022   • Acute ischemic stroke (HCC) 09/17/2022     Resolved Ambulatory Problems     Diagnosis Date Noted   • Anemia    • Arthritis    • Diabetes mellitus out of control    • Acute sinusitis    • Accumulation of fluid in tissues 12/17/2015   • Fatigue 12/17/2015   • Cardiomyopathy, ischemic 12/17/2015   • Acute appendicitis 03/02/2016   • Atherosclerosis of coronary artery 10/16/2012   • Altered mental status 11/30/2017   • Hypertensive encephalopathy syndrome 04/30/2018   • Hyperglycemia 05/04/2018   • Screening for colorectal cancer 08/22/2018   • Constipation 08/22/2018   • Snoring 12/13/2018   • Lower abdominal pain 05/27/2020   • Hypertensive emergency 02/01/2022     Past Medical History:   Diagnosis Date   • Acute congestive heart failure (HCC)    • CAD (coronary artery disease)    • Cancer ([facility])    • Chronic combined systolic and diastolic congestive heart failure ([facility])    • Chronic ischemic heart disease    • Chronic kidney disease (CKD)    • CKD (chronic kidney disease)    • COPD (chronic obstructive pulmonary disease) ([facility])    • Depression    • Diabetes mellitus type 2, uncontrolled, without complications    • Diabetic neuropathy ([facility])    • Disease of thyroid gland    • Elevated cholesterol    • Frequent PVCs    • GERD (gastroesophageal reflux disease)    • Heart attack ([facility])    • History of coronary artery disease    • Hyperlipidemia    • Hypertension    • Hypertensive encephalopathy    • Mental status change    • NO DEVICE/RECALLED    • Poor historian    • RBBB (right bundle branch block)    • Stroke ([facility])    • TIA (transient ischemic attack)    • Type 2 diabetes mellitus (HCC)          PAST SURGICAL HISTORY  Past Surgical History:   Procedure Laterality Date   • APPENDECTOMY N/A 02/14/2016    Dr. Alexye Dodson   • ARTERIOVENOUS FISTULA/SHUNT SURGERY Right 6/3/2022    Procedure: RIGHT FOREARM ARTERIOVENOUS  FISTULA PLACEMENT RADIOCEPHALIC WITH CEPHALIC VEIN TRANSPOSITION;  Surgeon: Eliseo Willis MD;  Location: [facility] MAIN OR;  Service: Vascular;  Laterality: Right;   • COLONOSCOPY      over 20 years ago.  no polyps    • COLONOSCOPY N/A 2018    Procedure: COLONOSCOPY;  Surgeon: Jose Enrique Louie MD;  Location: [facility] ENDOSCOPY;  Service: Gastroenterology   • COLONOSCOPY N/A 2018    IH, EH, polyps (TAs w/low grade dysplasia)   • COLONOSCOPY N/A 2020    Procedure: COLONOSCOPY;  Surgeon: Jose Enrique Louie MD;  Location: [facility] ENDOSCOPY;  Service: Gastroenterology;  Laterality: N/A;  Pre op-Anemia, Melena, History of Polyps  Post op-To Cecum/TI, Polyp, Poor Prep   • CORONARY ARTERY BYPASS GRAFT  2001   • ENDOSCOPY N/A 2020    Procedure: ESOPHAGOGASTRODUODENOSCOPY with biopsies;  Surgeon: Amanda Lovelace MD;  Location: [facility] ENDOSCOPY;  Service: Gastroenterology;  Laterality: N/A;  pre-anemia, dark stools  post-esophagitis, hiatal hernia   • ENDOSCOPY N/A 9/15/2020    Procedure: ESOPHAGOGASTRODUODENOSCOPY WITH BIOPSIES;  Surgeon: Jose Enrique Louie MD;  Location: [facility] ENDOSCOPY;  Service: Gastroenterology;  Laterality: N/A;  pre: history of melena and esophagitis  post: mild esophagitis and gastritis, small hiatal hernia   • HERNIA REPAIR Left 2019   • TONSILLECTOMY     • VASECTOMY           FAMILY HISTORY  Family History   Problem Relation Age of Onset   • Diabetes Mother    • Hypertension Mother    • Macular degeneration Mother    • Alcohol abuse Father    • Cancer Father         lung,  age 65   • Heart disease Father    • Alcohol abuse Brother    • Cirrhosis Brother          age 50   • Liver cancer Brother 45   • Diabetes Son    • Kidney failure Son    • Autism Son    • Malig Hyperthermia Neg Hx          SOCIAL HISTORY  Social History     Socioeconomic History   • Marital status:      Spouse name: Lissette   • Number of children: 3   • Years of education:  college   Tobacco Use   • Smoking status: Never   • Smokeless tobacco: Never   • Tobacco comments:     daily caffeine   Vaping Use   • Vaping Use: Never used   Substance and Sexual Activity   • Alcohol use: No     Comment: last drink 2 months ago   • Drug use: No   • Sexual activity: Defer         ALLERGIES  Prozac [fluoxetine hcl]        REVIEW OF SYSTEMS  Review of Systems     All systems reviewed and negative except for those discussed in HPI.       PHYSICAL EXAM  ED Triage Vitals [03/06/23 2018]   Temp Heart Rate Resp BP SpO2   98.3 °F (36.8 °C) 72 18 147/88 98 %      Temp src Heart Rate Source Patient Position BP Location FiO2 (%)   Tympanic Monitor -- -- --       Physical Exam      GENERAL: Chronically ill in appearance, nontoxic  HENT: nares patent  EYES: no scleral icterus  CV: regular rhythm, normal rate  RESPIRATORY: Mildly diminished breath sounds, mild rales at the base of bilateral lungs  ABDOMEN: soft  MUSCULOSKELETAL: no deformity  NEURO: alert, moves all extremities, follows commands  PSYCH:  calm, cooperative  SKIN: warm, dry    Vital signs and nursing notes reviewed.          LAB RESULTS  Recent Results (from the past 24 hour(s))   ECG 12 Lead ED Triage Standing Order; SOA    Collection Time: 03/06/23  8:30 PM   Result Value Ref Range    QT Interval 421 ms   Comprehensive Metabolic Panel    Collection Time: 03/06/23  9:39 PM    Specimen: Blood   Result Value Ref Range    Glucose 208 (H) 65 - 99 mg/dL    BUN 41 (H) 8 - 23 mg/dL    Creatinine 3.70 (H) 0.76 - 1.27 mg/dL    Sodium 140 136 - 145 mmol/L    Potassium 4.3 3.5 - 5.2 mmol/L    Chloride 110 (H) 98 - 107 mmol/L    CO2 21.0 (L) 22.0 - 29.0 mmol/L    Calcium 8.5 (L) 8.6 - 10.5 mg/dL    Total Protein 6.4 6.0 - 8.5 g/dL    Albumin 3.7 3.5 - 5.2 g/dL    ALT (SGPT) 17 1 - 41 U/L    AST (SGOT) 12 1 - 40 U/L    Alkaline Phosphatase 85 39 - 117 U/L    Total Bilirubin 0.2 0.0 - 1.2 mg/dL    Globulin 2.7 gm/dL    A/G Ratio 1.4 g/dL    BUN/Creatinine  Ratio 11.1 7.0 - 25.0    Anion Gap 9.0 5.0 - 15.0 mmol/L    eGFR 17.2 (L) >60.0 mL/min/1.73   BNP    Collection Time: 03/06/23  9:39 PM    Specimen: Blood   Result Value Ref Range    proBNP 6,516.0 (H) 0.0 - 900.0 pg/mL   Single High Sensitivity Troponin T    Collection Time: 03/06/23  9:39 PM    Specimen: Blood   Result Value Ref Range    HS Troponin T 101 (C) <15 ng/L   Green Top (Gel)    Collection Time: 03/06/23  9:39 PM   Result Value Ref Range    Extra Tube Hold for add-ons.    Lavender Top    Collection Time: 03/06/23  9:39 PM   Result Value Ref Range    Extra Tube hold for add-on    Gold Top - SST    Collection Time: 03/06/23  9:39 PM   Result Value Ref Range    Extra Tube Hold for add-ons.    Light Blue Top    Collection Time: 03/06/23  9:39 PM   Result Value Ref Range    Extra Tube Hold for add-ons.    CBC Auto Differential    Collection Time: 03/06/23  9:39 PM    Specimen: Blood   Result Value Ref Range    WBC 4.84 3.40 - 10.80 10*3/mm3    RBC 3.27 (L) 4.14 - 5.80 10*6/mm3    Hemoglobin 8.5 (L) 13.0 - 17.7 g/dL    Hematocrit 28.2 (L) 37.5 - 51.0 %    MCV 86.2 79.0 - 97.0 fL    MCH 26.0 (L) 26.6 - 33.0 pg    MCHC 30.1 (L) 31.5 - 35.7 g/dL    RDW 14.2 12.3 - 15.4 %    RDW-SD 43.9 37.0 - 54.0 fl    MPV 10.0 6.0 - 12.0 fL    Platelets 233 140 - 450 10*3/mm3    Neutrophil % 73.2 42.7 - 76.0 %    Lymphocyte % 13.4 (L) 19.6 - 45.3 %    Monocyte % 10.3 5.0 - 12.0 %    Eosinophil % 2.7 0.3 - 6.2 %    Basophil % 0.0 0.0 - 1.5 %    Immature Grans % 0.4 0.0 - 0.5 %    Neutrophils, Absolute 3.54 1.70 - 7.00 10*3/mm3    Lymphocytes, Absolute 0.65 (L) 0.70 - 3.10 10*3/mm3    Monocytes, Absolute 0.50 0.10 - 0.90 10*3/mm3    Eosinophils, Absolute 0.13 0.00 - 0.40 10*3/mm3    Basophils, Absolute 0.00 0.00 - 0.20 10*3/mm3    Immature Grans, Absolute 0.02 0.00 - 0.05 10*3/mm3    nRBC 0.0 0.0 - 0.2 /100 WBC   Procalcitonin    Collection Time: 03/06/23  9:39 PM    Specimen: Blood   Result Value Ref Range    Procalcitonin 0.10  0.00 - 0.25 ng/mL   Respiratory Panel PCR w/COVID-19(SARS-CoV-2) SUKUMAR/TERRELL/RAISSA/PAD/COR/MAD/ARNALDO In-House, NP Swab in UTM/VTM, 3-4 HR TAT - Swab, Nasopharynx    Collection Time: 03/07/23 12:29 AM    Specimen: Nasopharynx; Swab   Result Value Ref Range    ADENOVIRUS, PCR Not Detected Not Detected    Coronavirus 229E Not Detected Not Detected    Coronavirus HKU1 Not Detected Not Detected    Coronavirus NL63 Not Detected Not Detected    Coronavirus OC43 Not Detected Not Detected    COVID19 Not Detected Not Detected - Ref. Range    Human Metapneumovirus Not Detected Not Detected    Human Rhinovirus/Enterovirus Not Detected Not Detected    Influenza A PCR Not Detected Not Detected    Influenza B PCR Not Detected Not Detected    Parainfluenza Virus 1 Not Detected Not Detected    Parainfluenza Virus 2 Not Detected Not Detected    Parainfluenza Virus 3 Not Detected Not Detected    Parainfluenza Virus 4 Not Detected Not Detected    RSV, PCR Not Detected Not Detected    Bordetella pertussis pcr Not Detected Not Detected    Bordetella parapertussis PCR Not Detected Not Detected    Chlamydophila pneumoniae PCR Not Detected Not Detected    Mycoplasma pneumo by PCR Not Detected Not Detected   Lactic Acid, Plasma    Collection Time: 03/07/23 12:30 AM    Specimen: Blood   Result Value Ref Range    Lactate 0.9 0.5 - 2.0 mmol/L   CBC (No Diff)    Collection Time: 03/07/23  5:45 AM    Specimen: Blood   Result Value Ref Range    WBC 5.02 3.40 - 10.80 10*3/mm3    RBC 3.03 (L) 4.14 - 5.80 10*6/mm3    Hemoglobin 7.8 (L) 13.0 - 17.7 g/dL    Hematocrit 25.9 (L) 37.5 - 51.0 %    MCV 85.5 79.0 - 97.0 fL    MCH 25.7 (L) 26.6 - 33.0 pg    MCHC 30.1 (L) 31.5 - 35.7 g/dL    RDW 14.0 12.3 - 15.4 %    RDW-SD 43.2 37.0 - 54.0 fl    MPV 10.4 6.0 - 12.0 fL    Platelets 194 140 - 450 10*3/mm3       Ordered the above labs and reviewed the results.        RADIOLOGY  XR Chest 2 View    Result Date: 3/6/2023  PA AND LATERAL CHEST RADIOGRAPH  HISTORY: Shortness  of air  COMPARISON: 12/01/2022  FINDINGS: Patient is status post median sternotomy with coronary artery bypass grafting. There is increasing elevation of the right hemidiaphragm. There is right basilar consolidation and right pleural effusion, new when compared to prior exam. There is cardiomegaly There is vascular congestion. Patient also appears to have a small left pleural effusion. No pneumothorax is seen. There is some gaseous distention of bowel within the upper abdomen. There is calcification of the aorta.       1. Increasing consolidation at the right lung base. Some of this may be related to atelectasis. Pneumonia is not excluded. 2. Cardiomegaly with vascular congestion.  This report was finalized on 3/6/2023 10:01 PM by Dr. Alice Allen M.D.      CT Chest Without Contrast Diagnostic    Result Date: 3/7/2023  CT CHEST WITHOUT CONTRAST  HISTORY: Shortness of breath  COMPARISON: None available.  TECHNIQUE: Axial CT imaging was obtained through the thorax. No IV contrast was administered.  FINDINGS: The patient has a large right pleural effusion, as well as a small left pleural effusion. Thyroid gland, trachea, and esophagus appear unremarkable. There are coronary artery calcifications. There is marked the main pulmonary artery, which can be seen in setting of pulmonary arterial hypertension. Thoracic aorta is normal in caliber. There are shotty mediastinal lymph nodes. For example, a lower right paratracheal node measures up to 1.2 cm in short axis dimensions. Subcarinal node measures up to 1.5 cm in short axis dimensions. The patient is noted to have right basilar consolidation. This favored to be secondary to compressive atelectasis. There is also left basilar atelectasis. There is a hyperdense appearance to the interventricular septum, which can be seen in the setting of anemia. Images through the upper abdomen do not demonstrate any acute abnormalities. Patient does have some body wall edema. No  acute osseous abnormalities are seen.       1. Large right pleural effusion and small left pleural effusion. Consolidation at the right lung base is favored to be secondary to atelectasis. However, follow-up exam is suggested. There is also some milder left basilar atelectasis.  Radiation dose reduction techniques were utilized, including automated exposure control and exposure modulation based on body size.  This report was finalized on 3/7/2023 1:04 AM by Dr. Alice Allen M.D.        Ordered the above noted radiological studies. Reviewed by me in PACS.          PROCEDURES  Procedures          MEDICATIONS GIVEN IN ER  Medications   sodium chloride 0.9 % flush 10 mL (has no administration in time range)   sodium chloride 0.9 % flush 10 mL (has no administration in time range)   sodium chloride 0.9 % flush 10 mL (has no administration in time range)   sodium chloride 0.9 % infusion 40 mL (has no administration in time range)   nitroglycerin (NITROSTAT) SL tablet 0.4 mg (has no administration in time range)   acetaminophen (TYLENOL) tablet 650 mg (has no administration in time range)     Or   acetaminophen (TYLENOL) 160 MG/5ML solution 650 mg (has no administration in time range)     Or   acetaminophen (TYLENOL) suppository 650 mg (has no administration in time range)   ondansetron (ZOFRAN) tablet 4 mg (has no administration in time range)     Or   ondansetron (ZOFRAN) injection 4 mg (has no administration in time range)   calcium carbonate (TUMS) chewable tablet 500 mg (200 mg elemental) (has no administration in time range)   dextrose (GLUTOSE) oral gel 15 g (has no administration in time range)   dextrose (D50W) (25 g/50 mL) IV injection 25 g (has no administration in time range)   glucagon (GLUCAGEN) injection 1 mg (has no administration in time range)   insulin lispro (ADMELOG) injection 0-9 Units (has no administration in time range)   ipratropium-albuterol (DUO-NEB) nebulizer solution 3 mL (3 mL  Nebulization Given 3/7/23 0102)   bumetanide (BUMEX) injection 2 mg (2 mg Intravenous Given 3/7/23 7963)         MEDICAL DECISION MAKING, PROGRESS, and CONSULTS    Discussion below represents my analysis of pertinent findings related to patient's condition, differential diagnosis, treatment plan and final disposition.      Orders placed during this visit:  Orders Placed This Encounter   Procedures   • Respiratory Panel PCR w/COVID-19(SARS-CoV-2) [facility]/TERRELL/RAISSA/[facility] In-House, NP Swab in UTM/VTM, 3-4 HR TAT - Swab, Nasopharynx   • XR Chest 2 View   • CT Chest Without Contrast Diagnostic   • [place] Draw   • Comprehensive Metabolic Panel   • BNP   • Single High Sensitivity Troponin T   • CBC Auto Differential   • Procalcitonin   • Lactic Acid, Plasma   • Potassium   • Magnesium   • High Sensitivity Troponin T   • Blood Gas, Arterial -   • Basic Metabolic Panel   • CBC (No Diff)   • Hemoglobin A1c   • Diet: Cardiac Diets, Diabetic Diets; Healthy Heart (2-3 Na+); Consistent Carbohydrate; Texture: Regular Texture (IDDSI 7); Fluid Consistency: Thin (IDDSI 0)   • Undress & Gown   • Continuous Pulse Oximetry   • Vital Signs   • Vital Signs   • Intake & Output   • Weigh Patient   • Oral Care   • Place Sequential Compression Device   • Maintain Sequential Compression Device   • Telemetry - Maintain IV Access   • Continuous Cardiac Monitoring   • Telemetry - Pulse Oximetry   • May Be Off Telemetry for Tests   • Daily Weights   • Activity (Specify Details)   • Strict Intake & Output   • Do NOT Hold Basal or Correction Scale Insulin When Patient is NPO, Hold Scheduled Mealtime (Bolus) Insulin if NPO   • Code Status and Medical Interventions:   • LHA (on-call MD unless specified) Details   • Inpatient Nephrology Consult   • Inpatient Cardiology Consult   • Discontinue Patient Isolation   • Oxygen Therapy- Nasal Cannula; Titrate for SPO2: 90% - 95%   • Oxygen Therapy- Nasal Cannula; Titrate for SPO2: 90% - 95%   • POC  Glucose TID AC   • ECG 12 Lead ED Triage Standing Order; SOA   • ECG 12 Lead Chest Pain   • Insert Peripheral IV   • Insert Peripheral IV   • Initiate Observation Status   • CBC & Differential   • Green Top (Gel)   • Lavender Top   • Gold Top - SST   • Light Blue Top         Additional sources:  - Discussed/obtained information from independent historians: None  Additional information was obtained to confirm the patient's history.    • - External (non-ED) record review: Calculated left ventricular 3D EF = 56% Left ventricular systolic function is normal.  This was performed on a NEISHA echo that was obtained on 9/18/2022.  The echo was read by Dr. Tapan Jean Baptiste.    - Chronic or social conditions impacting care: None available      Differential diagnosis:      PNA, PTX, PE, pneumomediastinum, ACS, CHF, COPD exacerbation, reactive airway disease.      Additional orders considered but not ordered:          Independent interpretation of labs, radiology studies, and discussions with consultants:  ED Course as of 03/07/23 0618   Mon Mar 06, 2023   2355 Creatinine(!): 3.70  This is up from 2.84 x 3 months ago. [RC]   2356 Hemoglobin(!): 8.5  This is decreased by 0.7 when compared to 3 months ago [RC]      ED Course User Index  [RC] Dragan Flowers III, PA                  PPE: The patient wore a mask throughout the entire encounter. I wore a well-fitting mask.    DIAGNOSIS  Final diagnoses:   Shortness of breath   Pleural effusion, bilateral   Acute renal failure superimposed on chronic kidney disease, unspecified CKD stage, unspecified acute renal failure type (HCC)   Anemia, unspecified type         DISPOSITION  ADMISSION    Discussed treatment plan and reason for admission with pt/family and admitting physician.  Pt/family voiced understanding of the plan for admission for further testing/treatment as needed.       Latest Documented Vital Signs:  As of 06:18 EST  BP- 129/73 HR- 70 Temp- 98.3 °F (36.8 °C) (Tympanic)  O2 sat- 94%      --    Please note that portions of this were completed with a voice recognition program.       Note Disclaimer: At [facility], we believe that sharing information builds trust and better relationships. You are receiving this note because you are receiving care at [facility] or recently visited. It is possible you will see health information before a provider has talked with you about it. This kind of information can be easy to misunderstand. To help you fully understand what it means for your health, we urge you to discuss this note with your provider.       Dragan Flowers III, PA  03/07/23 0619

## 2023-03-07 NOTE — CONSULTS
[facility] Consult Note      Patient Name: Carlito Mo  : 1955  MRN: [id number]  Primary Care Physician:  Florencia Caballero MD  Referring Physician: Eliseo Gross MD  Date of admission: 3/6/2023    Subjective     Reason for Consult:  SYLVAIN CKD4    HPI:   Carlito Mo is a 67 y.o. male with h/o CKD stage 4 due to diabetic nephropathy (followed by Dr Bryan Huddleston), DM2, HTN, CAD/CABG, HFpEF, CVA on AC (eliquis), BPH who presents with worsening dyspnea on minimal exertion.  CXR showed ill defined right base opacity, but appears atelectasis on CT chest which also demonstrated large right and small left pleural effusions.  Stated weight of 180 is well over discharge weight 164 in 2022 (when hosp for SYLVAIN, CVA).  He takes lasix 40mg BID.  Denies lower ext swelling lately. Renal fcn historically labile, but Cr up 3.7 last night, 3.5 this AM, vs 2.8 in Sept and Dec 2022.  He was given bumex 2mg IV x1 this AM.  Also anemic, with hgb dropped 8.5 to 7.8 overnight.  He has right radial AVF placed May 2022 but unclear if deemed mature.  Denies nausea, dysgeusia, melena.  Has had some loose stool.  No dysuria or hesitancy.      Review of Systems:   14 point review of systems is otherwise negative except for mentioned above on HPI    Personal History     Past Medical History:   Diagnosis Date   • Acquired hypothyroidism    • Acute congestive heart failure (HCC)    • Acute respiratory failure with hypoxia (HCC)    • Acute sinusitis    • SYLVAIN (acute kidney injury) (HCC)     on CKD   • Anemia    • Arthritis    • CAD (coronary artery disease)    • Cancer (HCC)     skin   • Chronic combined systolic and diastolic congestive heart failure (HCC)    • Chronic ischemic heart disease    • Chronic kidney disease (CKD)    • CKD (chronic kidney disease)    • COPD (chronic obstructive pulmonary disease) (HCC)    • Depression    • Diabetes mellitus type 2, uncontrolled, without complications    • Diabetic  neuropathy (HCC)    • Disease of thyroid gland    • Elevated cholesterol    • Fatigue    • Frequent PVCs    • Generalized weakness    • GERD (gastroesophageal reflux disease)    • Heart attack (HCC)    • History of coronary artery disease     with remote history of bypass surgery in 2001   • Hyperlipidemia    • Hypertension    • Hypertensive encephalopathy    • Mental status change     Acute   • NO DEVICE/RECALLED    • Pleural effusion    • Pneumonia    • Poor historian    • RBBB (right bundle branch block)    • Stroke (HCC)     POOR VISION   • TIA (transient ischemic attack)    • Type 2 diabetes mellitus (HCC)    • Urinary retention    • Vitamin D deficiency        Past Surgical History:   Procedure Laterality Date   • APPENDECTOMY N/A 02/14/2016    Dr. Alexey Dodson   • ARTERIOVENOUS FISTULA/SHUNT SURGERY Right 6/3/2022    Procedure: RIGHT FOREARM ARTERIOVENOUS FISTULA PLACEMENT RADIOCEPHALIC WITH CEPHALIC VEIN TRANSPOSITION;  Surgeon: Eliseo Willis MD;  Location: [facility] MAIN OR;  Service: Vascular;  Laterality: Right;   • COLONOSCOPY      over 20 years ago.  no polyps    • COLONOSCOPY N/A 9/18/2018    Procedure: COLONOSCOPY;  Surgeon: Jose Enrique Louie MD;  Location: [facility] ENDOSCOPY;  Service: Gastroenterology   • COLONOSCOPY N/A 9/19/2018    IH, EH, polyps (TAs w/low grade dysplasia)   • COLONOSCOPY N/A 6/1/2020    Procedure: COLONOSCOPY;  Surgeon: Jose Enrique Louie MD;  Location: [facility] ENDOSCOPY;  Service: Gastroenterology;  Laterality: N/A;  Pre op-Anemia, Melena, History of Polyps  Post op-To Cecum/TI, Polyp, Poor Prep   • CORONARY ARTERY BYPASS GRAFT  11/2001   • ENDOSCOPY N/A 5/29/2020    Procedure: ESOPHAGOGASTRODUODENOSCOPY with biopsies;  Surgeon: Amanda Lovelace MD;  Location: [facility] ENDOSCOPY;  Service: Gastroenterology;  Laterality: N/A;  pre-anemia, dark stools  post-esophagitis, hiatal hernia   • ENDOSCOPY N/A 9/15/2020    Procedure: ESOPHAGOGASTRODUODENOSCOPY WITH BIOPSIES;   Surgeon: Jose Enrique Louie MD;  Location: [facility] ENDOSCOPY;  Service: Gastroenterology;  Laterality: N/A;  pre: history of melena and esophagitis  post: mild esophagitis and gastritis, small hiatal hernia   • HERNIA REPAIR Left 12/05/2019   • TONSILLECTOMY     • VASECTOMY         Family History: family history includes Alcohol abuse in his brother and father; Autism in his son; Cancer in his father; Cirrhosis in his brother; Diabetes in his mother and son; Heart disease in his father; Hypertension in his mother; Kidney failure in his son; Liver cancer (age of onset: 45) in his brother; Macular degeneration in his mother.    Social History:  reports that he has never smoked. He has never used smokeless tobacco. He reports that he does not drink alcohol and does not use drugs.    Home Medications:  Prior to Admission medications    Medication Sig Start Date End Date Taking? Authorizing Provider   acetaminophen (TYLENOL) 325 MG tablet Take 2 tablets by mouth Every 4 (Four) Hours As Needed for Mild Pain. 9/19/22   Asim Hogue MD   amLODIPine (NORVASC) 5 MG tablet Take 1 tablet by mouth Daily. 9/20/22   Asim Hogue MD   apixaban (ELIQUIS) 5 MG tablet tablet Take 1 tablet by mouth Every 12 (Twelve) Hours. Indications: Atrial Fibrillation 9/19/22   Asim Hogue MD   atorvastatin (LIPITOR) 80 MG tablet Take 1 tablet by mouth Every Night. 9/19/22   Asim Hogue MD   buPROPion XL (WELLBUTRIN XL) 150 MG 24 hr tablet Take 300 mg by mouth Daily.    ProviderAlonzo MD   carvedilol (COREG) 3.125 MG tablet Take 3.125 mg by mouth 2 (Two) Times a Day With Meals.    Alonzo Dean MD   Cholecalciferol (VITAMIN D3) 5000 units capsule capsule Take 5,000 Units by mouth Daily.    Alonzo Dean MD   famotidine (PEPCID) 10 MG tablet Take 10 mg by mouth 2 (Two) Times a Day.    Alonzo Dean MD   folic acid (FOLVITE) 1 MG tablet Take 1,000 mcg by mouth Daily. 7/2/22    Alonzo Dean MD   furosemide (LASIX) 40 MG tablet Take 40 mg by mouth 2 (Two) Times a Day. 7/2/22   Alonzo Dean MD   Insulin Glargine (BASAGLAR KWIKPEN) 100 UNIT/ML injection pen Inject 4 Units under the skin into the appropriate area as directed Every Night. Patient says he does not take consistently  Patient taking differently: Inject 4 Units under the skin into the appropriate area as directed Every Night. 2/8/22   Erica Welsh MD   insulin glulisine (Apidra) 100 UNIT/ML injection Inject 2 Units under the skin into the appropriate area as directed 3 (Three) Times a Day Before Meals. Patient says he does not take consistently  Patient taking differently: Inject 2 Units under the skin into the appropriate area as directed 3 (Three) Times a Day Before Meals. HAS NOT STARTED TAKING YET, WAITING FOR SUPPLIES 2/8/22   Erica Welsh MD   insulin lispro (ADMELOG) 100 UNIT/ML injection Inject 0-7 Units under the skin into the appropriate area as directed 3 (Three) Times a Day Before Meals. 9/19/22   Asim Hogue MD   levothyroxine (SYNTHROID, LEVOTHROID) 75 MCG tablet Take 1 tablet by mouth Daily. 2/8/22   Erica Welsh MD   lisinopril (PRINIVIL,ZESTRIL) 20 MG tablet Take 20 mg by mouth Daily. 7/2/22   Alonzo Dean MD   polyethylene glycol (polyethylene glycol) 17 g packet Take 17 g by mouth Daily As Needed (constipation). 9/19/22   Asim Hogue MD   tamsulosin (FLOMAX) 0.4 MG capsule 24 hr capsule Take 1 capsule by mouth Every Night.    Alonzo Dean MD   torsemide (DEMADEX) 10 MG tablet  7/6/22   Alonzo Dean MD   vitamin C (ASCORBIC ACID) 250 MG tablet Take 1,000 mg by mouth Daily.    Alonzo Dean MD       Allergies:  Allergies   Allergen Reactions   • Prozac [Fluoxetine Hcl] Other (See Comments)     shaking       Objective     Vitals:   Temp:  [98.3 °F (36.8 °C)] 98.3 °F (36.8 °C)  Heart Rate:  [64-72] 70  Resp:   [18] 18  BP: (128-147)/(70-88) 129/73  Flow (L/min):  [3] 3  No intake or output data in the 24 hours ending 03/07/23 0701    Physical Exam:    General Appearance: pleasant chronically ill appearing WM in no acute distress on RA   Skin: warm and dry  HEENT: oral mucosa normal, nonicteric sclera  Neck: supple, no JVD  Lungs: Bibasilar rales, no accessory muscle use  Heart: RRR, normal S1 and S2  Abdomen: soft, nontender, nondistended  : no palpable bladder  Extremities: 1+ BLE ankle edema, right radial AVF +T/B  Neuro: normal speech and mental status     Scheduled Meds:     insulin lispro, 0-9 Units, Subcutaneous, 4x Daily With Meals & Nightly  sodium chloride, 10 mL, Intravenous, Q12H      IV Meds:        Results Reviewed:   I have personally reviewed the results from the time of this admission to 3/7/2023 07:01 EST     Lab Results   Component Value Date    GLUCOSE 112 (H) 03/07/2023    CALCIUM 7.9 (L) 03/07/2023     03/07/2023    K 4.6 03/07/2023    CO2 20.7 (L) 03/07/2023     (H) 03/07/2023    BUN 44 (H) 03/07/2023    CREATININE 3.55 (H) 03/07/2023    EGFRIFAFRI 74 12/12/2017    EGFRIFNONA 19 (L) 02/04/2022    BCR 12.4 03/07/2023    ANIONGAP 9.3 03/07/2023      Lab Results   Component Value Date    MG 2.1 12/01/2022    PHOS 3.5 09/19/2022    ALBUMIN 3.7 03/06/2023           Assessment / Plan     ASSESSMENT:  1. Non olig SYLVAIN - multiple episodes in past in relation to cardiorenal syn; Cr is up to 3.7 but improved slightly to 3.5 with diuresis.  Has vol excess.  K normal, bicarb 21 with normal AG.  No uremic sx.  R/O retention.    2. CKD stage 4 - diabetic nephropathy + cardiorenal syn; BL Cr ~ 2.8 in Sept & Dec 2022.  Dr Bryan Huddleston follows.  Unclear if RUE AVF (May 2022) been deemed mature  3. Vol overload - has bilat R > L pleural effusions, mild ankle edema, reported weight much higher than prior weight (+16 lb), will actually weigh him; s/p IV bumex in ER, continue this   4. HFpEF - EF low in past  but 56% on TTE 9/18/22  5. Hx CAD s/p CABG  6. HTN - SBP 120s in ER, on norvasc, coreg, lisinopril, hold latter   7. DM2 on insulin  8. Anemia of CKD - hgb down 8.5 to 7.8; denies melena    PLAN:  Continue bumex 2mg IV BID x3 more doses (through tomorrow AM)  Check UA, PVR, iron stores  Consider PULM eval, may need thoracentesis   Hold lisinopril     Thank you for involving us in the care of Carlito Mo.  Please feel free to call with any questions.    Jose Enrique Montelongo MD  03/07/23  07:01 [facility]    [facility]  [phone]

## 2023-03-07 NOTE — H&P
Patient Name:  Carlito Mo  YOB: 1955  MRN:  [id number]  Admit Date:  3/6/2023  Patient Care Team:  Florencia Caballero MD as PCP - General (Internal Medicine)  Amber Murguia MD as Consulting Physician (Cardiology)  Sera La [facility] as Pharmacist  Seth Ladd MD as Surgeon (General Surgery)  Kim Hoyos MD PhD as Consulting Physician (Hematology and Oncology)  Jerome Diallo MD as Referring Physician (Family Medicine)  Jose Enrique Louie MD as Consulting Physician (Gastroenterology)  Nima Huddleston MD as Consulting Physician (Nephrology)      Subjective   History Present Illness     Chief Complaint   Patient presents with   • Shortness of Breath       Mr. Mo is a 67 y.o.  With a history of CKD 4, DM 2 with nephropathy, HTN, CAD, history CABG, CHF, CVA, chronic anticoagulation, BPH who presents with dyspnea on exertion and at rest.  He noticed the shortness of breath yesterday when he was out to eat for dinner.  Upon returning home he was still short of breath so called EMS.  He denies chest pain, palpitations, lightheadedness, fever, chills, cough.  He has gained almost 20 pounds since September 2022. He denies lower extremity swelling and is on Lasix 40 mg twice daily. He denies LUTS, nausea vomiting but has had a few loose stools.      History of Present Illness  Review of Systems   Constitutional: Positive for fatigue. Negative for appetite change.   HENT: Negative for congestion and trouble swallowing.    Respiratory: Positive for chest tightness and shortness of breath. Negative for choking.    Cardiovascular: Negative for chest pain and leg swelling.   Gastrointestinal: Negative for abdominal distention, abdominal pain, blood in stool, constipation, diarrhea, nausea and vomiting.   Genitourinary: Negative for difficulty urinating and dysuria.   Musculoskeletal: Negative for back pain.   Skin: Negative for pallor.   Neurological: Negative for dizziness.    Psychiatric/Behavioral: Negative for confusion.        Personal History     Past Medical History:   Diagnosis Date   • Acquired hypothyroidism    • Acute congestive heart failure (HCC)    • Acute respiratory failure with hypoxia (HCC)    • Acute sinusitis    • SYLVAIN (acute kidney injury) (HCC)     on CKD   • Anemia    • Arthritis    • CAD (coronary artery disease)    • Cancer (HCC)     skin   • Chronic combined systolic and diastolic congestive heart failure (HCC)    • Chronic ischemic heart disease    • Chronic kidney disease (CKD)    • CKD (chronic kidney disease)    • COPD (chronic obstructive pulmonary disease) (HCC)    • Depression    • Diabetes mellitus type 2, uncontrolled, without complications    • Diabetic neuropathy (HCC)    • Disease of thyroid gland    • Elevated cholesterol    • Fatigue    • Frequent PVCs    • Generalized weakness    • GERD (gastroesophageal reflux disease)    • Heart attack (HCC)    • History of coronary artery disease     with remote history of bypass surgery in 2001   • Hyperlipidemia    • Hypertension    • Hypertensive encephalopathy    • Mental status change     Acute   • NO DEVICE/RECALLED    • Pleural effusion    • Pneumonia    • Poor historian    • RBBB (right bundle branch block)    • Stroke (HCC)     POOR VISION   • TIA (transient ischemic attack)    • Type 2 diabetes mellitus (HCC)    • Urinary retention    • Vitamin D deficiency      Past Surgical History:   Procedure Laterality Date   • APPENDECTOMY N/A 02/14/2016    Dr. Alexey Dodson   • ARTERIOVENOUS FISTULA/SHUNT SURGERY Right 6/3/2022    Procedure: RIGHT FOREARM ARTERIOVENOUS FISTULA PLACEMENT RADIOCEPHALIC WITH CEPHALIC VEIN TRANSPOSITION;  Surgeon: Eliseo Willis MD;  Location: [facility] OR;  Service: Vascular;  Laterality: Right;   • COLONOSCOPY      over 20 years ago.  no polyps    • COLONOSCOPY N/A 9/18/2018    Procedure: COLONOSCOPY;  Surgeon: Jose Enrique Louie MD;  Location: [facility] ENDOSCOPY;   Service: Gastroenterology   • COLONOSCOPY N/A 2018    IH, EH, polyps (TAs w/low grade dysplasia)   • COLONOSCOPY N/A 2020    Procedure: COLONOSCOPY;  Surgeon: Jose Enrique Louie MD;  Location: [facility] ENDOSCOPY;  Service: Gastroenterology;  Laterality: N/A;  Pre op-Anemia, Melena, History of Polyps  Post op-To Cecum/TI, Polyp, Poor Prep   • CORONARY ARTERY BYPASS GRAFT  2001   • ENDOSCOPY N/A 2020    Procedure: ESOPHAGOGASTRODUODENOSCOPY with biopsies;  Surgeon: Amanda Lovelace MD;  Location: [facility] ENDOSCOPY;  Service: Gastroenterology;  Laterality: N/A;  pre-anemia, dark stools  post-esophagitis, hiatal hernia   • ENDOSCOPY N/A 9/15/2020    Procedure: ESOPHAGOGASTRODUODENOSCOPY WITH BIOPSIES;  Surgeon: Jose Enrique Louie MD;  Location: [facility] ENDOSCOPY;  Service: Gastroenterology;  Laterality: N/A;  pre: history of melena and esophagitis  post: mild esophagitis and gastritis, small hiatal hernia   • HERNIA REPAIR Left 2019   • TONSILLECTOMY     • VASECTOMY       Family History   Problem Relation Age of Onset   • Diabetes Mother    • Hypertension Mother    • Macular degeneration Mother    • Alcohol abuse Father    • Cancer Father         lung,  age 65   • Heart disease Father    • Alcohol abuse Brother    • Cirrhosis Brother          age 50   • Liver cancer Brother 45   • Diabetes Son    • Kidney failure Son    • Autism Son    • Malig Hyperthermia Neg Hx      Social History     Tobacco Use   • Smoking status: Never   • Smokeless tobacco: Never   • Tobacco comments:     daily caffeine   Vaping Use   • Vaping Use: Never used   Substance Use Topics   • Alcohol use: No     Comment: last drink 2 months ago   • Drug use: No     No current facility-administered medications on file prior to encounter.     Current Outpatient Medications on File Prior to Encounter   Medication Sig Dispense Refill   • acetaminophen (TYLENOL) 325 MG tablet Take 2 tablets by mouth Every 4 (Four)  Hours As Needed for Mild Pain.     • amLODIPine (NORVASC) 5 MG tablet Take 1 tablet by mouth Daily.     • apixaban (ELIQUIS) 5 MG tablet tablet Take 1 tablet by mouth Every 12 (Twelve) Hours. Indications: Atrial Fibrillation 60 tablet    • atorvastatin (LIPITOR) 80 MG tablet Take 1 tablet by mouth Every Night. 90 tablet    • buPROPion XL (WELLBUTRIN XL) 150 MG 24 hr tablet Take 300 mg by mouth Daily.     • carvedilol (COREG) 3.125 MG tablet Take 3.125 mg by mouth 2 (Two) Times a Day With Meals.     • Cholecalciferol (VITAMIN D3) 5000 units capsule capsule Take 5,000 Units by mouth Daily.     • famotidine (PEPCID) 10 MG tablet Take 10 mg by mouth 2 (Two) Times a Day.     • folic acid (FOLVITE) 1 MG tablet Take 1,000 mcg by mouth Daily.     • furosemide (LASIX) 40 MG tablet Take 40 mg by mouth 2 (Two) Times a Day.     • Insulin Glargine (BASAGLAR KWIKPEN) 100 UNIT/ML injection pen Inject 4 Units under the skin into the appropriate area as directed Every Night. Patient says he does not take consistently (Patient taking differently: Inject 4 Units under the skin into the appropriate area as directed Every Night.) 15 mL 3   • insulin glulisine (Apidra) 100 UNIT/ML injection Inject 2 Units under the skin into the appropriate area as directed 3 (Three) Times a Day Before Meals. Patient says he does not take consistently (Patient taking differently: Inject 2 Units under the skin into the appropriate area as directed 3 (Three) Times a Day Before Meals. HAS NOT STARTED TAKING YET, WAITING FOR SUPPLIES) 15 mL 3   • insulin lispro (ADMELOG) 100 UNIT/ML injection Inject 0-7 Units under the skin into the appropriate area as directed 3 (Three) Times a Day Before Meals.  12   • levothyroxine (SYNTHROID, LEVOTHROID) 75 MCG tablet Take 1 tablet by mouth Daily. 30 tablet 5   • lisinopril (PRINIVIL,ZESTRIL) 20 MG tablet Take 20 mg by mouth Daily.     • polyethylene glycol (polyethylene glycol) 17 g packet Take 17 g by mouth Daily As  Needed (constipation).     • tamsulosin (FLOMAX) 0.4 MG capsule 24 hr capsule Take 1 capsule by mouth Every Night.     • torsemide (DEMADEX) 10 MG tablet      • vitamin C (ASCORBIC ACID) 250 MG tablet Take 1,000 mg by mouth Daily.       Allergies   Allergen Reactions   • Prozac [Fluoxetine Hcl] Other (See Comments)     shaking       Objective    Objective     Vital Signs  Temp:  [98.3 °F (36.8 °C)] 98.3 °F (36.8 °C)  Heart Rate:  [64-72] 70  Resp:  [18] 18  BP: (128-147)/(70-88) 129/73  SpO2:  [91 %-100 %] 94 %  on  Flow (L/min):  [3] 3;   Device (Oxygen Therapy): nasal cannula  Body mass index is 24.41 kg/m².    Physical Exam  Vitals and nursing note reviewed.   Constitutional:       General: He is not in acute distress.     Appearance: He is well-developed. He is not toxic-appearing.      Comments: Disheveled, NAD, mild chronic ill appearing   HENT:      Head: Normocephalic and atraumatic.   Eyes:      Conjunctiva/sclera: Conjunctivae normal.   Neck:      Trachea: No tracheal deviation.   Cardiovascular:      Rate and Rhythm: Normal rate and regular rhythm.   Pulmonary:      Effort: Pulmonary effort is normal. No respiratory distress.      Breath sounds: Rales present.      Comments: Stable on room air.   Abdominal:      General: Bowel sounds are normal. There is no distension.      Palpations: Abdomen is soft. There is no mass.      Tenderness: There is no abdominal tenderness. There is no guarding or rebound.   Musculoskeletal:         General: Normal range of motion.      Cervical back: Normal range of motion.      Right lower leg: Edema present.      Left lower leg: Edema present.   Skin:     General: Skin is warm and dry.   Neurological:      General: No focal deficit present.      Mental Status: He is alert and oriented to person, place, and time. Mental status is at baseline.   Psychiatric:         Mood and Affect: Mood normal.         Results Review:  I reviewed the patient's new clinical results.  I  reviewed the patient's new imaging results and agree with the interpretation.  I reviewed the patient's other test results and agree with the interpretation  I personally viewed and interpreted the patient's EKG/Telemetry data  Discussed with ED provider.    Lab Results (last 24 hours)     Procedure Component Value Units Date/Time    CBC & Differential [[id number]]  (Abnormal) Collected: 03/06/23 2139    Specimen: Blood Updated: 03/06/23 2149    Narrative:      The following orders were created for panel order CBC & Differential.  Procedure                               Abnormality         Status                     ---------                               -----------         ------                     CBC Auto Differential[[id number]]        Abnormal            Final result                 Please view results for these tests on the individual orders.    Comprehensive Metabolic Panel [[id number]]  (Abnormal) Collected: 03/06/23 2139    Specimen: Blood Updated: 03/06/23 2208     Glucose 208 mg/dL      BUN 41 mg/dL      Creatinine 3.70 mg/dL      Sodium 140 mmol/L      Potassium 4.3 mmol/L      Chloride 110 mmol/L      CO2 21.0 mmol/L      Calcium 8.5 mg/dL      Total Protein 6.4 g/dL      Albumin 3.7 g/dL      ALT (SGPT) 17 U/L      AST (SGOT) 12 U/L      Alkaline Phosphatase 85 U/L      Total Bilirubin 0.2 mg/dL      Globulin 2.7 gm/dL      A/G Ratio 1.4 g/dL      BUN/Creatinine Ratio 11.1     Anion Gap 9.0 mmol/L      eGFR 17.2 mL/min/1.73     Narrative:      GFR Normal >60  Chronic Kidney Disease <60  Kidney Failure <15      BNP [[id number]]  (Abnormal) Collected: 03/06/23 2139    Specimen: Blood Updated: 03/06/23 2207     proBNP 6,516.0 pg/mL     Narrative:      Among patients with dyspnea, NT-proBNP is highly sensitive for the detection of acute congestive heart failure. In addition NT-proBNP of <300 pg/ml effectively rules out acute congestive heart failure with 99% negative predictive value.    Results may be  falsely decreased if patient taking Biotin.      Single High Sensitivity Troponin T [[id number]]  (Abnormal) Collected: 03/06/23 2139    Specimen: Blood Updated: 03/06/23 2212     HS Troponin T 101 ng/L     Narrative:      High Sensitive Troponin T Reference Range:  <10.0 ng/L- Negative Female for AMI  <15.0 ng/L- Negative Male for AMI  >=10 - Abnormal Female indicating possible myocardial injury.  >=15 - Abnormal Male indicating possible myocardial injury.   Clinicians would have to utilize clinical acumen, EKG, Troponin, and serial changes to determine if it is an Acute Myocardial Infarction or myocardial injury due to an underlying chronic condition.         CBC Auto Differential [[id number]]  (Abnormal) Collected: 03/06/23 2139    Specimen: Blood Updated: 03/06/23 2149     WBC 4.84 10*3/mm3      RBC 3.27 10*6/mm3      Hemoglobin 8.5 g/dL      Hematocrit 28.2 %      MCV 86.2 fL      MCH 26.0 pg      MCHC 30.1 g/dL      RDW 14.2 %      RDW-SD 43.9 fl      MPV 10.0 fL      Platelets 233 10*3/mm3      Neutrophil % 73.2 %      Lymphocyte % 13.4 %      Monocyte % 10.3 %      Eosinophil % 2.7 %      Basophil % 0.0 %      Immature Grans % 0.4 %      Neutrophils, Absolute 3.54 10*3/mm3      Lymphocytes, Absolute 0.65 10*3/mm3      Monocytes, Absolute 0.50 10*3/mm3      Eosinophils, Absolute 0.13 10*3/mm3      Basophils, Absolute 0.00 10*3/mm3      Immature Grans, Absolute 0.02 10*3/mm3      nRBC 0.0 /100 WBC     Procalcitonin [[id number]]  (Normal) Collected: 03/06/23 2139    Specimen: Blood Updated: 03/07/23 0231     Procalcitonin 0.10 ng/mL     Narrative:      As a Marker for Sepsis (Non-Neonates):    1. <0.5 ng/mL represents a low risk of severe sepsis and/or septic shock.  2. >2 ng/mL represents a high risk of severe sepsis and/or septic shock.    As a Marker for Lower Respiratory Tract Infections that require antibiotic therapy:    PCT on Admission    Antibiotic Therapy       6-12 Hrs later    >0.5                 "Strongly Recommended  >0.25 - <0.5        Recommended   0.1 - 0.25          Discouraged              Remeasure/reassess PCT  <0.1                Strongly Discouraged     Remeasure/reassess PCT    As 28 day mortality risk marker: \"Change in Procalcitonin Result\" (>80% or <=80%) if Day 0 (or Day 1) and Day 4 values are available. Refer to http://www.[email or web address] [email or web address]pct[place]cal[place].com    Change in PCT <=80%  A decrease of PCT levels below or equal to 80% defines a positive change in PCT test result representing a higher risk for 28-day all-cause mortality of patients diagnosed with severe sepsis for septic shock.    Change in PCT >80%  A decrease of PCT levels of more than 80% defines a negative change in PCT result representing a lower risk for 28-day all-cause mortality of patients diagnosed with severe sepsis or septic shock.       Respiratory Panel PCR w/COVID-19(SARS-CoV-2) SUKUMAR/TERRELL/RAISSA/PAD/COR/MAD/ARNALDO In-House, NP Swab in UTM/[facility], 3-4 HR TAT - Swab, Nasopharynx [[id number]]  (Normal) Collected: 03/07/23 0029    Specimen: Swab from Nasopharynx Updated: 03/07/23 0142     ADENOVIRUS, PCR Not Detected     Coronavirus 229E Not Detected     Coronavirus HKU1 Not Detected     Coronavirus NL63 Not Detected     Coronavirus OC43 Not Detected     COVID19 Not Detected     Human Metapneumovirus Not Detected     Human Rhinovirus/Enterovirus Not Detected     Influenza A PCR Not Detected     Influenza B PCR Not Detected     Parainfluenza Virus 1 Not Detected     Parainfluenza Virus 2 Not Detected     Parainfluenza Virus 3 Not Detected     Parainfluenza Virus 4 Not Detected     RSV, PCR Not Detected     Bordetella pertussis pcr Not Detected     Bordetella parapertussis PCR Not Detected     Chlamydophila pneumoniae PCR Not Detected     Mycoplasma pneumo by PCR Not Detected    Narrative:      In the setting of a positive respiratory panel with a viral infection PLUS a negative procalcitonin without other underlying concern for bacterial " infection, consider observing off antibiotics or discontinuation of antibiotics and continue supportive care. If the respiratory panel is positive for atypical bacterial infection (Bordetella pertussis, Chlamydophila pneumoniae, or Mycoplasma pneumoniae), consider antibiotic de-escalation to target atypical bacterial infection.    Lactic Acid, Plasma [[id number]]  (Normal) Collected: 03/07/23 0030    Specimen: Blood Updated: 03/07/23 0102     Lactate 0.9 mmol/L     Basic Metabolic Panel [[id number]]  (Abnormal) Collected: 03/07/23 0545    Specimen: Blood Updated: 03/07/23 0656     Glucose 112 mg/dL      BUN 44 mg/dL      Creatinine 3.55 mg/dL      Sodium 140 mmol/L      Potassium 4.6 mmol/L      Chloride 110 mmol/L      CO2 20.7 mmol/L      Calcium 7.9 mg/dL      BUN/Creatinine Ratio 12.4     Anion Gap 9.3 mmol/L      eGFR 18.0 mL/min/1.73     Narrative:      GFR Normal >60  Chronic Kidney Disease <60  Kidney Failure <15      CBC (No Diff) [[id number]]  (Abnormal) Collected: 03/07/23 0545    Specimen: Blood Updated: 03/07/23 0609     WBC 5.02 10*3/mm3      RBC 3.03 10*6/mm3      Hemoglobin 7.8 g/dL      Hematocrit 25.9 %      MCV 85.5 fL      MCH 25.7 pg      MCHC 30.1 g/dL      RDW 14.0 %      RDW-SD 43.2 fl      MPV 10.4 fL      Platelets 194 10*3/mm3     Hemoglobin A1c [[id number]]  (Abnormal) Collected: 03/07/23 0545    Specimen: Blood Updated: 03/07/23 0803     Hemoglobin A1C 6.60 %     Narrative:      Hemoglobin A1C Ranges:    Increased Risk for Diabetes  5.7% to 6.4%  Diabetes                     >= 6.5%  Diabetic Goal                < 7.0%    POC Glucose Once [[id number]]  (Normal) Collected: 03/07/23 0908    Specimen: Blood Updated: 03/07/23 0910     Glucose 119 mg/dL      Comment: Meter: [id number] : [id number] Matt RAMIREZ             Imaging Results (Last 24 Hours)     Procedure Component Value Units Date/Time    CT Chest Without Contrast Diagnostic [[id number]] Collected: 03/07/23 0058     Updated:  03/07/23 0107    Narrative:      CT CHEST WITHOUT CONTRAST     HISTORY: Shortness of breath     COMPARISON: None available.     TECHNIQUE: Axial CT imaging was obtained through the thorax. No IV  contrast was administered.     FINDINGS:  The patient has a large right pleural effusion, as well as a small left  pleural effusion. Thyroid gland, trachea, and esophagus appear  unremarkable. There are coronary artery calcifications. There is marked  the main pulmonary artery, which can be seen in setting of pulmonary  arterial hypertension. Thoracic aorta is normal in caliber. There are  shotty mediastinal lymph nodes. For example, a lower right paratracheal  node measures up to 1.2 cm in short axis dimensions. Subcarinal node  measures up to 1.5 cm in short axis dimensions. The patient is noted to  have right basilar consolidation. This favored to be secondary to  compressive atelectasis. There is also left basilar atelectasis. There  is a hyperdense appearance to the interventricular septum, which can be  seen in the setting of anemia. Images through the upper abdomen do not  demonstrate any acute abnormalities. Patient does have some body wall  edema. No acute osseous abnormalities are seen.       Impression:         1. Large right pleural effusion and small left pleural effusion.  Consolidation at the right lung base is favored to be secondary to  atelectasis. However, follow-up exam is suggested. There is also some  milder left basilar atelectasis.     Radiation dose reduction techniques were utilized, including automated  exposure control and exposure modulation based on body size.     This report was finalized on 3/7/2023 1:04 AM by Dr. Alice Allen M.D.       XR Chest 2 View [[id number]] Collected: 03/06/23 2200     Updated: 03/06/23 2204    Narrative:      PA AND LATERAL CHEST RADIOGRAPH     HISTORY: Shortness of air     COMPARISON: 12/01/2022     FINDINGS:  Patient is status post median sternotomy with  coronary artery bypass  grafting. There is increasing elevation of the right hemidiaphragm.  There is right basilar consolidation and right pleural effusion, new  when compared to prior exam. There is cardiomegaly There is vascular  congestion. Patient also appears to have a small left pleural effusion.  No pneumothorax is seen. There is some gaseous distention of bowel  within the upper abdomen. There is calcification of the aorta.       Impression:         1. Increasing consolidation at the right lung base. Some of this may be  related to atelectasis. Pneumonia is not excluded.  2. Cardiomegaly with vascular congestion.     This report was finalized on 3/6/2023 10:01 PM by Dr. Alice Allen M.D.             Results for orders placed during the hospital encounter of 09/15/22    Adult Transthoracic Echo Complete W/ Cont if Necessary Per Protocol (With Agitated Saline)    Interpretation Summary  · Calculated left ventricular 3D EF = 56% Left ventricular systolic function is normal.  · Saline test results are negative.  · Left ventricular diastolic function was normal.  · Left atrial volume is moderately increased.  · Estimated right ventricular systolic pressure from tricuspid regurgitation is normal (<35 mmHg).      ECG 12 Lead ED Triage Standing Order; SOA   Final Result   HEART RATE= 70  bpm   RR Interval= 857  ms   VT Interval=   ms   P Horizontal Axis=   deg   P Front Axis=   deg   QRSD Interval= 172  ms   QT Interval= 421  ms   QRS Axis= -78  deg   T Wave Axis= 62  deg   - ABNORMAL ECG -   Proable sinus rhythm vs Accelerated Junctional Rhythm   Right bundle branch block   No significant change compared to prior ekg   Electronically Signed By: Seth Gerber ([facility]) 06-Mar-2023 22:35:03   Date and Time of Study: 2023-03-06 20:30:23           Assessment/Plan     Active Hospital Problems    Diagnosis  POA   • **DE LEON (dyspnea on exertion) [R06.09]  Yes   • Paroxysmal atrial fibrillation (HCC) [I48.0]  Yes   •  Chronic anticoagulation [Z79.01]  Not Applicable   • CKD (chronic kidney disease) stage 4, GFR 15-29 ml/min ([facility]) [N18.4]  Yes   • Anemia, chronic disease [D63.8]  Yes   • Chronically elevated troponin [R77.8]  Yes   • Coronary artery disease involving native coronary artery of native heart without angina pectoris [I25.10]  Yes   • YAW (obstructive sleep apnea) [G47.33]  Yes   • Acute on chronic combined systolic and diastolic congestive heart failure ([facility]) [I50.43]  Yes   • Recurrent strokes ([facility]) [I63.9]  Yes   • Acquired hypothyroidism [E03.9]  Yes   • Benign prostatic hyperplasia with nocturia [N40.1, R35.1]  Yes   • High blood pressure associated with diabetes ([facility]) [E11.59, I15.2]  Yes   • Type 2 diabetes mellitus with ophthalmic complication ([facility]) [E11.39]  Yes      Resolved Hospital Problems   No resolved problems to display.          · Dyspnea on exertion/bilateral pleural effusions  Secondary to volume overload with bilateral pleural effusions.  IV Bumex in the ER which will be continued by nephrology.  Monitor weight, renal function, IOs closely.  Consult pulmonary for pleural effusions and possible thoracentesis    · SYLVAIN on CKD 4  Evaluated by nephrology history of cardiorenal syndrome.  Creatinine baseline 2.8 compared to 3.7 on admission but decreased to 3.5 following diuresis since arrival.  Management per nephrology with additional work-up started.  Hold nephrotoxic agents including ACE inhibitor    · Elevated troponin/CAD history of CABG 2001 and MI  Denies chest pain.  No acute ischemic change on EKG.  Repeat troponin but likely elevated from renal failure    · Acute on chronic combined Congestive heart failure  EF 56% on echocardiogram September 2022.  BNP 6000.  Repeat echocardiogram. Followed by [facility].      · HTN   Hold lisinopril.     DM2 with nephropathy  Correctional insulin.  Check A1c.  Avoid hypoglycemia.    Acute on chronic anemia  Check anemia studies.  Hemoglobin within the last year has  ranged from 9.2-11.2 but currently 8.5 which is not exceptionally low from baseline.  No overt bleeding.     · History of CVAs /HLD  on aspirin 81 mg and Eliquis 2.5 mg twice daily  · Continue statin    YAW noncompliant with CPAP  ·    · I discussed the patient's findings and my recommendations with patient, nursing staff and ED provider.    VTE Prophylaxis - lovenox. Hold eliquis for possible thoracentesis.   Code Status - Full code.       JI Adler  [facility]  03/07/23  09:25 EST

## 2023-03-07 NOTE — ED NOTES
Pt eating breakfast. No complaints at this time.     Appropriate PPE was worn during encounter. Hand hygiene performed upon entrance and exiting pt room.

## 2023-03-08 ENCOUNTER — APPOINTMENT (OUTPATIENT)
Dept: ULTRASOUND IMAGING | Facility: HOSPITAL | Age: 68
DRG: 291 | End: 2023-03-08
Payer: MEDICARE

## 2023-03-08 ENCOUNTER — APPOINTMENT (OUTPATIENT)
Dept: GENERAL RADIOLOGY | Facility: HOSPITAL | Age: 68
DRG: 291 | End: 2023-03-08
Payer: MEDICARE

## 2023-03-08 ENCOUNTER — APPOINTMENT (OUTPATIENT)
Dept: CARDIOLOGY | Facility: HOSPITAL | Age: 68
DRG: 291 | End: 2023-03-08
Payer: MEDICARE

## 2023-03-08 PROBLEM — R06.02 SHORTNESS OF BREATH: Status: ACTIVE | Noted: 2023-03-08

## 2023-03-08 LAB
ALBUMIN FLD-MCNC: 0.7 G/DL
ALBUMIN SERPL-MCNC: 3.3 G/DL (ref 3.5–5.2)
ANION GAP SERPL CALCULATED.3IONS-SCNC: 11.5 MMOL/L (ref 5–15)
AORTIC DIMENSIONLESS INDEX: 0.7 (DI)
APPEARANCE FLD: CLEAR
BH CV ECHO MEAS - ACS: 2.07 CM
BH CV ECHO MEAS - AO MAX PG: 7.8 MMHG
BH CV ECHO MEAS - AO MEAN PG: 4 MMHG
BH CV ECHO MEAS - AO ROOT DIAM: 3.4 CM
BH CV ECHO MEAS - AO V2 MAX: 140 CM/SEC
BH CV ECHO MEAS - AO V2 VTI: 29.6 CM
BH CV ECHO MEAS - AVA(I,D): 2.41 CM2
BH CV ECHO MEAS - CONTRAST EF 4CH: 44 CM2
BH CV ECHO MEAS - EDV(CUBED): 94.9 ML
BH CV ECHO MEAS - EDV(MOD-SP2): 169 ML
BH CV ECHO MEAS - EDV(MOD-SP4): 160 ML
BH CV ECHO MEAS - EF(MOD-BP): 44 %
BH CV ECHO MEAS - EF(MOD-SP2): 46.2 %
BH CV ECHO MEAS - EF(MOD-SP4): 41.9 %
BH CV ECHO MEAS - ESV(CUBED): 49.3 ML
BH CV ECHO MEAS - ESV(MOD-SP2): 91 ML
BH CV ECHO MEAS - ESV(MOD-SP4): 93 ML
BH CV ECHO MEAS - FS: 19.6 %
BH CV ECHO MEAS - IVS/LVPW: 1.13 CM
BH CV ECHO MEAS - IVSD: 1.18 CM
BH CV ECHO MEAS - LAT PEAK E' VEL: 7 CM/SEC
BH CV ECHO MEAS - LV MASS(C)D: 182.4 GRAMS
BH CV ECHO MEAS - LV MAX PG: 4.1 MMHG
BH CV ECHO MEAS - LV MEAN PG: 1.87 MMHG
BH CV ECHO MEAS - LV V1 MAX: 100.6 CM/SEC
BH CV ECHO MEAS - LV V1 VTI: 20.9 CM
BH CV ECHO MEAS - LVIDD: 4.6 CM
BH CV ECHO MEAS - LVIDS: 3.7 CM
BH CV ECHO MEAS - LVOT AREA: 3.4 CM2
BH CV ECHO MEAS - LVOT DIAM: 2.08 CM
BH CV ECHO MEAS - LVPWD: 1.05 CM
BH CV ECHO MEAS - MED PEAK E' VEL: 4.6 CM/SEC
BH CV ECHO MEAS - MV A DUR: 0.16 SEC
BH CV ECHO MEAS - MV DEC SLOPE: 585.5 CM/SEC2
BH CV ECHO MEAS - MV DEC TIME: 161 MSEC
BH CV ECHO MEAS - MV E MAX VEL: 137 CM/SEC
BH CV ECHO MEAS - MV MAX PG: 8.7 MMHG
BH CV ECHO MEAS - MV MEAN PG: 2.6 MMHG
BH CV ECHO MEAS - MV P1/2T: 77.8 MSEC
BH CV ECHO MEAS - MV V2 VTI: 34.1 CM
BH CV ECHO MEAS - MVA(P1/2T): 2.8 CM2
BH CV ECHO MEAS - MVA(VTI): 2.09 CM2
BH CV ECHO MEAS - PA ACC TIME: 0.12 SEC
BH CV ECHO MEAS - PA PR(ACCEL): 26.7 MMHG
BH CV ECHO MEAS - PA V2 MAX: 90 CM/SEC
BH CV ECHO MEAS - QP/QS: 0.57
BH CV ECHO MEAS - RAP SYSTOLE: 15 MMHG
BH CV ECHO MEAS - RV MAX PG: 2.27 MMHG
BH CV ECHO MEAS - RV V1 MAX: 75.4 CM/SEC
BH CV ECHO MEAS - RV V1 VTI: 14.8 CM
BH CV ECHO MEAS - RVOT DIAM: 1.88 CM
BH CV ECHO MEAS - RVSP: 59 MMHG
BH CV ECHO MEAS - SV(LVOT): 71.3 ML
BH CV ECHO MEAS - SV(MOD-SP2): 78 ML
BH CV ECHO MEAS - SV(MOD-SP4): 67 ML
BH CV ECHO MEAS - SV(RVOT): 40.8 ML
BH CV ECHO MEAS - TAPSE (>1.6): 1.6 CM
BH CV ECHO MEAS - TR MAX PG: 44.2 MMHG
BH CV ECHO MEAS - TR MAX VEL: 332.5 CM/SEC
BH CV ECHO MEASUREMENTS AVERAGE E/E' RATIO: 23.62
BH CV VAS BP LEFT ARM: NORMAL MMHG
BH CV XLRA - RV BASE: 3.5 CM
BH CV XLRA - RV LENGTH: 8 CM
BH CV XLRA - RV MID: 3.9 CM
BH CV XLRA - TDI S': 9.4 CM/SEC
BUN SERPL-MCNC: 51 MG/DL (ref 8–23)
BUN/CREAT SERPL: 14 (ref 7–25)
CALCIUM SPEC-SCNC: 8.5 MG/DL (ref 8.6–10.5)
CHLORIDE SERPL-SCNC: 108 MMOL/L (ref 98–107)
CO2 SERPL-SCNC: 19.5 MMOL/L (ref 22–29)
COLOR FLD: NORMAL
CREAT SERPL-MCNC: 3.65 MG/DL (ref 0.76–1.27)
DEPRECATED RDW RBC AUTO: 42.3 FL (ref 37–54)
EGFRCR SERPLBLD CKD-EPI 2021: 17.4 ML/MIN/1.73
EOSINOPHIL NFR FLD MANUAL: 3 %
ERYTHROCYTE [DISTWIDTH] IN BLOOD BY AUTOMATED COUNT: 13.5 % (ref 12.3–15.4)
FERRITIN SERPL-MCNC: 305 NG/ML (ref 30–400)
GLUCOSE BLDC GLUCOMTR-MCNC: 111 MG/DL (ref 70–130)
GLUCOSE BLDC GLUCOMTR-MCNC: 152 MG/DL (ref 70–130)
GLUCOSE BLDC GLUCOMTR-MCNC: 156 MG/DL (ref 70–130)
GLUCOSE BLDC GLUCOMTR-MCNC: 163 MG/DL (ref 70–130)
GLUCOSE FLD-MCNC: 125 MG/DL
GLUCOSE SERPL-MCNC: 109 MG/DL (ref 65–99)
HCT VFR BLD AUTO: 27.4 % (ref 37.5–51)
HGB BLD-MCNC: 8.5 G/DL (ref 13–17.7)
IRON 24H UR-MRATE: 62 MCG/DL (ref 59–158)
IRON SATN MFR SERPL: 23 % (ref 20–50)
LDH FLD-CCNC: 35 U/L
LEFT ATRIUM VOLUME INDEX: 58.8 ML/M2
LYMPHOCYTES NFR FLD MANUAL: 1 %
MAXIMAL PREDICTED HEART RATE: 153 BPM
MCH RBC QN AUTO: 26.2 PG (ref 26.6–33)
MCHC RBC AUTO-ENTMCNC: 31 G/DL (ref 31.5–35.7)
MCV RBC AUTO: 84.6 FL (ref 79–97)
METHOD: NORMAL
MONOCYTES NFR FLD: 29 %
MONOS+MACROS NFR FLD: 55 %
NEUTROPHILS NFR FLD MANUAL: 12 %
NUC CELL # FLD: 160 /MM3
PH FLD: 7.47 [PH]
PHOSPHATE SERPL-MCNC: 4.4 MG/DL (ref 2.5–4.5)
PLATELET # BLD AUTO: 188 10*3/MM3 (ref 140–450)
PMV BLD AUTO: 10.5 FL (ref 6–12)
POTASSIUM SERPL-SCNC: 5.2 MMOL/L (ref 3.5–5.2)
PROT FLD-MCNC: 1 G/DL
RBC # BLD AUTO: 3.24 10*6/MM3 (ref 4.14–5.8)
RBC # FLD AUTO: 96 /MM3
SINUS: 3.3 CM
SODIUM SERPL-SCNC: 139 MMOL/L (ref 136–145)
STJ: 2.9 CM
STRESS TARGET HR: 130 BPM
TIBC SERPL-MCNC: 274 MCG/DL (ref 298–536)
TRANSFERRIN SERPL-MCNC: 184 MG/DL (ref 200–360)
URATE SERPL-MCNC: 7 MG/DL (ref 3.4–7)
WBC NRBC COR # BLD: 6.14 10*3/MM3 (ref 3.4–10.8)

## 2023-03-08 PROCEDURE — 88184 FLOWCYTOMETRY/ TC 1 MARKER: CPT

## 2023-03-08 PROCEDURE — 87206 SMEAR FLUORESCENT/ACID STAI: CPT | Performed by: INTERNAL MEDICINE

## 2023-03-08 PROCEDURE — 83986 ASSAY PH BODY FLUID NOS: CPT | Performed by: INTERNAL MEDICINE

## 2023-03-08 PROCEDURE — 84157 ASSAY OF PROTEIN OTHER: CPT | Performed by: INTERNAL MEDICINE

## 2023-03-08 PROCEDURE — 88112 CYTOPATH CELL ENHANCE TECH: CPT | Performed by: INTERNAL MEDICINE

## 2023-03-08 PROCEDURE — 99232 SBSQ HOSP IP/OBS MODERATE 35: CPT | Performed by: INTERNAL MEDICINE

## 2023-03-08 PROCEDURE — 83540 ASSAY OF IRON: CPT | Performed by: INTERNAL MEDICINE

## 2023-03-08 PROCEDURE — 87075 CULTR BACTERIA EXCEPT BLOOD: CPT | Performed by: INTERNAL MEDICINE

## 2023-03-08 PROCEDURE — 94761 N-INVAS EAR/PLS OXIMETRY MLT: CPT

## 2023-03-08 PROCEDURE — 97166 OT EVAL MOD COMPLEX 45 MIN: CPT

## 2023-03-08 PROCEDURE — 88185 FLOWCYTOMETRY/TC ADD-ON: CPT

## 2023-03-08 PROCEDURE — 80069 RENAL FUNCTION PANEL: CPT | Performed by: INTERNAL MEDICINE

## 2023-03-08 PROCEDURE — 63710000001 INSULIN LISPRO (HUMAN) PER 5 UNITS: Performed by: NURSE PRACTITIONER

## 2023-03-08 PROCEDURE — 87015 SPECIMEN INFECT AGNT CONCNTJ: CPT | Performed by: INTERNAL MEDICINE

## 2023-03-08 PROCEDURE — 88305 TISSUE EXAM BY PATHOLOGIST: CPT | Performed by: INTERNAL MEDICINE

## 2023-03-08 PROCEDURE — 94799 UNLISTED PULMONARY SVC/PX: CPT

## 2023-03-08 PROCEDURE — 87070 CULTURE OTHR SPECIMN AEROBIC: CPT | Performed by: INTERNAL MEDICINE

## 2023-03-08 PROCEDURE — 87205 SMEAR GRAM STAIN: CPT | Performed by: INTERNAL MEDICINE

## 2023-03-08 PROCEDURE — 89051 BODY FLUID CELL COUNT: CPT | Performed by: INTERNAL MEDICINE

## 2023-03-08 PROCEDURE — 25510000001 PERFLUTREN (DEFINITY) 8.476 MG IN SODIUM CHLORIDE (PF) 0.9 % 10 ML INJECTION: Performed by: NURSE PRACTITIONER

## 2023-03-08 PROCEDURE — 71045 X-RAY EXAM CHEST 1 VIEW: CPT

## 2023-03-08 PROCEDURE — 0W993ZZ DRAINAGE OF RIGHT PLEURAL CAVITY, PERCUTANEOUS APPROACH: ICD-10-PCS | Performed by: RADIOLOGY

## 2023-03-08 PROCEDURE — 82962 GLUCOSE BLOOD TEST: CPT

## 2023-03-08 PROCEDURE — 83615 LACTATE (LD) (LDH) ENZYME: CPT | Performed by: INTERNAL MEDICINE

## 2023-03-08 PROCEDURE — 85027 COMPLETE CBC AUTOMATED: CPT | Performed by: INTERNAL MEDICINE

## 2023-03-08 PROCEDURE — 82728 ASSAY OF FERRITIN: CPT | Performed by: INTERNAL MEDICINE

## 2023-03-08 PROCEDURE — 76942 ECHO GUIDE FOR BIOPSY: CPT

## 2023-03-08 PROCEDURE — 82042 OTHER SOURCE ALBUMIN QUAN EA: CPT | Performed by: INTERNAL MEDICINE

## 2023-03-08 PROCEDURE — 0 LIDOCAINE 1 % SOLUTION: Performed by: RADIOLOGY

## 2023-03-08 PROCEDURE — 87116 MYCOBACTERIA CULTURE: CPT | Performed by: INTERNAL MEDICINE

## 2023-03-08 PROCEDURE — 36415 COLL VENOUS BLD VENIPUNCTURE: CPT | Performed by: INTERNAL MEDICINE

## 2023-03-08 PROCEDURE — 84466 ASSAY OF TRANSFERRIN: CPT | Performed by: INTERNAL MEDICINE

## 2023-03-08 PROCEDURE — 82945 GLUCOSE OTHER FLUID: CPT | Performed by: INTERNAL MEDICINE

## 2023-03-08 PROCEDURE — 97116 GAIT TRAINING THERAPY: CPT

## 2023-03-08 PROCEDURE — 93306 TTE W/DOPPLER COMPLETE: CPT | Performed by: INTERNAL MEDICINE

## 2023-03-08 PROCEDURE — 93306 TTE W/DOPPLER COMPLETE: CPT

## 2023-03-08 PROCEDURE — 97535 SELF CARE MNGMENT TRAINING: CPT

## 2023-03-08 PROCEDURE — 97161 PT EVAL LOW COMPLEX 20 MIN: CPT

## 2023-03-08 PROCEDURE — 84550 ASSAY OF BLOOD/URIC ACID: CPT | Performed by: NURSE PRACTITIONER

## 2023-03-08 RX ORDER — LIDOCAINE HYDROCHLORIDE 10 MG/ML
10 INJECTION, SOLUTION INFILTRATION; PERINEURAL ONCE
Status: COMPLETED | OUTPATIENT
Start: 2023-03-08 | End: 2023-03-08

## 2023-03-08 RX ADMIN — IPRATROPIUM BROMIDE AND ALBUTEROL SULFATE 3 ML: .5; 3 SOLUTION RESPIRATORY (INHALATION) at 15:34

## 2023-03-08 RX ADMIN — FAMOTIDINE 10 MG: 20 TABLET, FILM COATED ORAL at 10:12

## 2023-03-08 RX ADMIN — GUAIFENESIN 600 MG: 600 TABLET, EXTENDED RELEASE ORAL at 20:47

## 2023-03-08 RX ADMIN — CARVEDILOL 3.12 MG: 3.12 TABLET, FILM COATED ORAL at 20:47

## 2023-03-08 RX ADMIN — ASPIRIN 81 MG: 81 TABLET, CHEWABLE ORAL at 10:12

## 2023-03-08 RX ADMIN — Medication 10 ML: at 10:20

## 2023-03-08 RX ADMIN — LIDOCAINE HYDROCHLORIDE 4 ML: 10 INJECTION, SOLUTION INFILTRATION; PERINEURAL at 08:27

## 2023-03-08 RX ADMIN — INSULIN LISPRO 2 UNITS: 100 INJECTION, SOLUTION INTRAVENOUS; SUBCUTANEOUS at 11:58

## 2023-03-08 RX ADMIN — ATORVASTATIN CALCIUM 80 MG: 80 TABLET, FILM COATED ORAL at 20:47

## 2023-03-08 RX ADMIN — BUDESONIDE AND FORMOTEROL FUMARATE DIHYDRATE 2 PUFF: 160; 4.5 AEROSOL RESPIRATORY (INHALATION) at 20:17

## 2023-03-08 RX ADMIN — GUAIFENESIN 600 MG: 600 TABLET, EXTENDED RELEASE ORAL at 10:12

## 2023-03-08 RX ADMIN — LEVOTHYROXINE SODIUM 75 MCG: 0.07 TABLET ORAL at 05:53

## 2023-03-08 RX ADMIN — BUMETANIDE 2 MG: 0.25 INJECTION INTRAMUSCULAR; INTRAVENOUS at 10:21

## 2023-03-08 RX ADMIN — IPRATROPIUM BROMIDE AND ALBUTEROL SULFATE 3 ML: .5; 3 SOLUTION RESPIRATORY (INHALATION) at 12:34

## 2023-03-08 RX ADMIN — PERFLUTREN 1.5 ML: 6.52 INJECTION, SUSPENSION INTRAVENOUS at 09:54

## 2023-03-08 RX ADMIN — BUDESONIDE AND FORMOTEROL FUMARATE DIHYDRATE 2 PUFF: 160; 4.5 AEROSOL RESPIRATORY (INHALATION) at 07:06

## 2023-03-08 RX ADMIN — TAMSULOSIN HYDROCHLORIDE 0.4 MG: 0.4 CAPSULE ORAL at 20:47

## 2023-03-08 RX ADMIN — INSULIN LISPRO 2 UNITS: 100 INJECTION, SOLUTION INTRAVENOUS; SUBCUTANEOUS at 16:54

## 2023-03-08 RX ADMIN — FAMOTIDINE 10 MG: 20 TABLET, FILM COATED ORAL at 20:47

## 2023-03-08 RX ADMIN — FUROSEMIDE 60 MG: 40 TABLET ORAL at 14:55

## 2023-03-08 RX ADMIN — Medication 10 ML: at 20:47

## 2023-03-08 RX ADMIN — INSULIN LISPRO 2 UNITS: 100 INJECTION, SOLUTION INTRAVENOUS; SUBCUTANEOUS at 22:03

## 2023-03-08 NOTE — CASE MANAGEMENT/SOCIAL WORK
Discharge Planning Assessment  [facility]     Patient Name: Carlito Mo  MRN: [id number]  Today's Date: 3/8/2023    Admit Date: 3/6/2023    Plan: Home with spouse.  Pt would like to continue with outpatient PT at [facility].   Discharge Needs Assessment     Row Name 03/08/23 1144       Living Environment    People in Home spouse;child(carlos), adult    Current Living Arrangements home    Primary Care Provided by self;spouse/significant other    Provides Primary Care For no one    Family Caregiver if Needed spouse    Quality of Family Relationships helpful;supportive    Able to Return to Prior Arrangements yes       Resource/Environmental Concerns    Resource/Environmental Concerns none       Transition Planning    Patient/Family Anticipates Transition to home with family    Patient/Family Anticipated Services at Transition outpatient care    Transportation Anticipated family or friend will provide       Discharge Needs Assessment    Readmission Within the Last 30 Days no previous admission in last 30 days    Equipment Currently Used at Home cane, straight;grab bar;shower chair;walker, rolling;oxygen;scales    Concerns to be Addressed discharge planning    Anticipated Changes Related to Illness none    Equipment Needed After Discharge none    Provided Post Acute Provider List? N/A    Provided Post Acute Provider Quality & Resource List? N/A               Discharge Plan     Row Name 03/08/23 1146       Plan    Plan Home with spouse.  Pt would like to continue with outpatient PT at [facility].    Plan Comments S/W pt at bedside.  Facesheet info confirmed..  Pt lives in a trilevel house w/ his wife Lissette and 2 adult sons.  Lissette assists at home with cooking, household chores and with transportation.  Their sons do not assist.   Pt does not drive.  [facility] has used [facility] past and has been to [facility] for rehab.  Pt states he has been goint to outpatient PT at [facility] and would like to continue  that upon DC.  Per his request, pvt pay caregiver list given for possible assist with housework and personal care. .........Heike GONZALES/ GISELLE              Continued Care and Services - Admitted Since 3/6/2023    Coordination has not been started for this encounter.          Demographic Summary     Row Name 03/08/23 1143       General Information    Admission Type observation    Arrived From home    Required Notices Provided Observation Status Notice    Referral Source admission list    Reason for Consult discharge planning    Preferred Language English               Functional Status     Row Name 03/08/23 1143       Functional Status    Usual Activity Tolerance moderate    Current Activity Tolerance moderate       Assessment of Health Literacy    How often do you have someone help you read hospital materials? Sometimes       Functional Status, IADL    Medications independent;assistive person    Meal Preparation independent;assistive person    Housekeeping independent;assistive person    Laundry independent    Shopping independent;assistive person       Mental Status    General Appearance WDL WDL       Mental Status Summary    Recent Changes in Mental Status/Cognitive Functioning no changes       Employment/    Employment Status retired                         Heike Sellers RN

## 2023-03-08 NOTE — PROGRESS NOTES
Nutrition Services    Patient Name:  Carlito Mo  YOB: 1955  MRN: [id number]  Admit Date:  3/6/2023    Assessment Date:  03/08/23    Comment: MST-3    66 y/o male admitted d/t shortness of breath, bilateral pleural effusions    RD visited pt at bedside. Pt reports his appetite has been fair, usually eats 2 meals per day. Pt reports hx of weight fluctuating d/t fluid status/on diuretics. Per review of past wts, wt loss of ~21# (11%) is noted between July and September 2022. Wt appears stable since 1/23/23 162# per office visit wt. Pt had thoracentesis this morning, pt reports 2L removed. Pt likes Boost GC- agreed to adding TID with meals. RD will continue to follow.     Recommendations:  1. Cardiac, diabetic diet  2. Add Boost GC TID with meals      CLINICAL NUTRITION ASSESSMENT      Reason for Assessment MST score 2+     Diagnosis/Problem   Shortness of breath/DE LEON, bilateral pleural effusions   Medical/Surgical History Past Medical History:   Diagnosis Date   • Acquired hypothyroidism    • Acute congestive heart failure (HCC)    • Acute respiratory failure with hypoxia (HCC)    • Acute sinusitis    • SYLVAIN (acute kidney injury) (HCC)     on CKD   • Anemia    • Arthritis    • CAD (coronary artery disease)    • Cancer (HCC)     skin   • Chronic combined systolic and diastolic congestive heart failure (HCC)    • Chronic ischemic heart disease    • Chronic kidney disease (CKD)    • CKD (chronic kidney disease)    • COPD (chronic obstructive pulmonary disease) (HCC)    • Depression    • Diabetes mellitus type 2, uncontrolled, without complications    • Diabetic neuropathy (HCC)    • Disease of thyroid gland    • Elevated cholesterol    • Fatigue    • Frequent PVCs    • Generalized weakness    • GERD (gastroesophageal reflux disease)    • Heart attack (HCC)    • History of coronary artery disease     with remote history of bypass surgery in 2001   • Hyperlipidemia    • Hypertension    • Hypertensive  encephalopathy    • Mental status change     Acute   • NO DEVICE/RECALLED    • Pleural effusion    • Pneumonia    • Poor historian    • RBBB (right bundle branch block)    • Stroke (HCC)     POOR VISION   • TIA (transient ischemic attack)    • Type 2 diabetes mellitus (HCC)    • Urinary retention    • Vitamin D deficiency        Past Surgical History:   Procedure Laterality Date   • APPENDECTOMY N/A 02/14/2016    Dr. Alexey Dodson   • ARTERIOVENOUS FISTULA/SHUNT SURGERY Right 6/3/2022    Procedure: RIGHT FOREARM ARTERIOVENOUS FISTULA PLACEMENT RADIOCEPHALIC WITH CEPHALIC VEIN TRANSPOSITION;  Surgeon: Eliseo Willis MD;  Location: [facility] MAIN OR;  Service: Vascular;  Laterality: Right;   • COLONOSCOPY      over 20 years ago.  no polyps    • COLONOSCOPY N/A 9/18/2018    Procedure: COLONOSCOPY;  Surgeon: Jose Enrique Louie MD;  Location: [facility] ENDOSCOPY;  Service: Gastroenterology   • COLONOSCOPY N/A 9/19/2018    IH, EH, polyps (TAs w/low grade dysplasia)   • COLONOSCOPY N/A 6/1/2020    Procedure: COLONOSCOPY;  Surgeon: Jose Enrique Louie MD;  Location: [facility] ENDOSCOPY;  Service: Gastroenterology;  Laterality: N/A;  Pre op-Anemia, Melena, History of Polyps  Post op-To Cecum/TI, Polyp, Poor Prep   • CORONARY ARTERY BYPASS GRAFT  11/2001   • ENDOSCOPY N/A 5/29/2020    Procedure: ESOPHAGOGASTRODUODENOSCOPY with biopsies;  Surgeon: Amanda Lovelace MD;  Location: [facility] ENDOSCOPY;  Service: Gastroenterology;  Laterality: N/A;  pre-anemia, dark stools  post-esophagitis, hiatal hernia   • ENDOSCOPY N/A 9/15/2020    Procedure: ESOPHAGOGASTRODUODENOSCOPY WITH BIOPSIES;  Surgeon: Jose Enrique Louie MD;  Location: [facility] ENDOSCOPY;  Service: Gastroenterology;  Laterality: N/A;  pre: history of melena and esophagitis  post: mild esophagitis and gastritis, small hiatal hernia   • HERNIA REPAIR Left 12/05/2019   • TONSILLECTOMY     • VASECTOMY          Encounter Information        Nutrition History:  Eats 2  "meals/day, fair appetite   Food Preferences:    Supplements:    Factors Affecting Intake: No factors at this time     Anthropometrics        Current Height  Current Weight  BMI kg/m2 Height: 182.9 cm (72\")  Weight: 73 kg (161 lb) (03/08/23 0954)  Body mass index is 21.84 kg/m².   Adjusted BMI (if applicable)        Admission Weight 74.6kg bedscale       Ideal Body Weight (IBW) 77.6kg   Adjusted IBW (if applicable)        Usual Body Weight (UBW) 160s   Weight Change/Trend Other: stable since Jan, fluctuates d/t fluid status/diurectics       Weight History Wt Readings from Last 30 Encounters:   03/08/23 0954 73 kg (161 lb)   03/08/23 0552 73.3 kg (161 lb 9.6 oz)   03/07/23 1302 74.6 kg (164 lb 7.4 oz)   03/06/23 2020 81.6 kg (180 lb)   10/05/22 1412 71.2 kg (157 lb)   09/18/22 1420 74.4 kg (164 lb)   09/16/22 0500 74.6 kg (164 lb 7.4 oz)   09/16/22 0458 74.6 kg (164 lb 7.4 oz)   07/29/22 0615 74.6 kg (164 lb 8 oz)   07/28/22 0630 75 kg (165 lb 4.8 oz)   07/27/22 0701 76.9 kg (169 lb 8 oz)   07/27/22 0452 77.4 kg (170 lb 11.2 oz)   07/26/22 0809 79.5 kg (175 lb 3.2 oz)   07/26/22 0603 81.5 kg (179 lb 9.6 oz)   07/25/22 0553 83.4 kg (183 lb 14.4 oz)   07/24/22 2007 84.1 kg (185 lb 8 oz)   06/29/22 1422 84.4 kg (186 lb)   06/15/22 1357 80.7 kg (178 lb)   06/10/22 1542 79.3 kg (174 lb 12.8 oz)   06/08/22 1355 77.6 kg (171 lb)   06/04/22 1451 78.8 kg (173 lb 11.6 oz)   06/03/22 0852 80.9 kg (178 lb 6.4 oz)   06/01/22 1934 81.6 kg (180 lb)   05/20/22 0633 79.7 kg (175 lb 11.3 oz)   05/18/22 1342 79.1 kg (174 lb 6.4 oz)   03/08/22 0505 78.2 kg (172 lb 4.8 oz)   03/07/22 0554 78.9 kg (173 lb 14.4 oz)   03/06/22 0228 78.8 kg (173 lb 11.2 oz)   03/05/22 0535 78.3 kg (172 lb 9.6 oz)   03/04/22 0530 75.8 kg (167 lb 1.6 oz)   03/03/22 0602 76 kg (167 lb 8.8 oz)   03/02/22 0512 74.9 kg (165 lb 2 oz)   03/01/22 0647 74.8 kg (165 lb)   02/08/22 1307 77.6 kg (171 lb)   02/03/22 1333 81.6 kg (180 lb)   02/02/22 1949 81.6 kg (180 lb) "   02/01/22 1917 81.6 kg (180 lb)   12/28/21 1120 83 kg (183 lb)   12/22/21 0510 85.4 kg (188 lb 3.2 oz)   12/21/21 0555 85.5 kg (188 lb 6.4 oz)   12/20/21 0519 84.4 kg (186 lb)   12/19/21 0519 84.4 kg (186 lb 1.6 oz)   12/18/21 0522 85.1 kg (187 lb 9.6 oz)   12/17/21 0422 85.7 kg (189 lb)   12/16/21 0348 84.6 kg (186 lb 8 oz)   12/15/21 0525 85.3 kg (188 lb 1.6 oz)   12/13/21 2116 86.2 kg (190 lb)   09/29/21 0913 78 kg (172 lb)   09/01/21 1414 80.3 kg (177 lb)   07/27/21 1537 77.6 kg (171 lb 1.6 oz)   06/02/21 1111 73.7 kg (162 lb 6.4 oz)   05/22/21 0300 84.4 kg (186 lb)   05/21/21 1439 82.6 kg (182 lb)   05/21/21 0050 82.8 kg (182 lb 9.6 oz)   05/21/21 0625 82.6 kg (182 lb)   01/06/21 1426 82.1 kg (181 lb)   10/27/20 1144 84.4 kg (186 lb)   10/06/20 1538 87.1 kg (192 lb 1.6 oz)   08/26/20 1410 85.5 kg (188 lb 6.4 oz)   07/23/20 1424 85.5 kg (188 lb 9.6 oz)   07/22/20 1335 85.3 kg (188 lb)           --  Tests/Procedures        Tests/Procedures CT scan, Thoracentesis, Ultrasound     Labs       Pertinent Labs    Results from last 7 days   Lab Units 03/08/23  0504 03/07/23  0545 03/06/23  2139   SODIUM mmol/L 139 140 140   POTASSIUM mmol/L 5.2 4.6 4.3   CHLORIDE mmol/L 108* 110* 110*   CO2 mmol/L 19.5* 20.7* 21.0*   BUN mg/dL 51* 44* 41*   CREATININE mg/dL 3.65* 3.55* 3.70*   CALCIUM mg/dL 8.5* 7.9* 8.5*   BILIRUBIN mg/dL  --   --  0.2   ALK PHOS U/L  --   --  85   ALT (SGPT) U/L  --   --  17   AST (SGOT) U/L  --   --  12   GLUCOSE mg/dL 109* 112* 208*     Results from last 7 days   Lab Units 03/08/23  0504   PHOSPHORUS mg/dL 4.4   HEMOGLOBIN g/dL 8.5*   HEMATOCRIT % 27.4*   WBC 10*3/mm3 6.14   ALBUMIN g/dL 3.3*     Results from last 7 days   Lab Units 03/08/23  0504 03/07/23  0545 03/06/23  2139   PLATELETS 10*3/mm3 188 194 233     COVID19   Date Value Ref Range Status   03/07/2023 Not Detected Not Detected - Ref. Range Final     Lab Results   Component Value Date    HGBA1C 6.60 (H) 03/07/2023          Medications            Scheduled Medications aspirin, 81 mg, Oral, Daily  atorvastatin, 80 mg, Oral, Nightly  budesonide-formoterol, 2 puff, Inhalation, BID - RT  buPROPion XL, 150 mg, Oral, Daily  carvedilol, 3.125 mg, Oral, Nightly  cholecalciferol, 5,000 Units, Oral, Daily  famotidine, 10 mg, Oral, BID  folic acid, 1,000 mcg, Oral, Daily  guaiFENesin, 600 mg, Oral, Q12H  insulin lispro, 0-9 Units, Subcutaneous, 4x Daily With Meals & Nightly  ipratropium-albuterol, 3 mL, Nebulization, 4x Daily - RT  levothyroxine, 75 mcg, Oral, Q AM  pantoprazole, 40 mg, Oral, Daily  sodium chloride, 10 mL, Intravenous, Q12H  tamsulosin, 0.4 mg, Oral, Nightly       Infusions hold, 1 each  hold, 1 each       PRN Medications •  acetaminophen **OR** acetaminophen **OR** acetaminophen  •  calcium carbonate  •  dextrose  •  dextrose  •  dextrose  •  dextrose  •  glucagon (human recombinant)  •  hold  •  hold  •  nitroglycerin  •  ondansetron **OR** ondansetron  •  polyethylene glycol  •  sodium chloride  •  sodium chloride  •  sodium chloride     Physical Findings          Physical Appearance alert, disheveled    Oral/Mouth Cavity WNL   Edema  no edema   Gastrointestinal last bowel movement:3/7   Skin  skin intact   Tubes/Drains none   NFPE Consented to exam, Date Completed: 3/8     Fat Loss Unable  None  Mild  Moderate Severe   Orbital    x    Upper Arm    x    Thoracic  x       Muscle Loss         Temple   x     Clavicle     x    Acromion    x     Scapular  x       Dorsal Hand    x     Patellar   x     Thigh  x       Calf    x       Current Nutrition Orders & Evaluation of Intake       Oral Nutrition     Food Allergies NKFA   Current PO Diet Diet: Cardiac Diets, Diabetic Diets; Healthy Heart (2-3 Na+); Consistent Carbohydrate; Texture: Regular Texture (IDDSI 7); Fluid Consistency: Thin (IDDSI 0)   Supplement n/a   PO Evaluation     % PO Intake     # of Days Evaluated    --  PES STATEMENT / NUTRITION DIAGNOSIS      Nutrition Dx Problem  Problem:  Predicted Suboptimal Intake  Etiology: Medical Diagnosis PMH  Signs/Symptoms: PO intake 2 meals/day, need for ONS, wt fluctuating    Comment:    --  NUTRITION INTERVENTION / PLAN OF CARE      Intervention Goal(s) Maintain nutrition status and Maintain weight         RD Intervention/Action Supplement offered/refused, Encourage intake and Follow Tx Progress         Prescription/Orders:       PO Diet       Supplements Boost GC TID      Snacks       Enteral Nutrition       Parenteral Nutrition    New Prescription Ordered? Yes   --      Monitor/Evaluation Per protocol, PO intake, Supplement intake, Weight   Discharge Plan/Needs No discharge needs identified at this time   Education Will instruct as appropriate   --    RD to follow per protocol.      Electronically signed by:  Anisha Lujan RD  03/08/23 10:58 EST

## 2023-03-08 NOTE — CONSULTS
CONSULT NOTE    Patient Identification:  Carlito Mo  67 y.o.  male  1955  [id number]            Requesting physician: Dr. Smith Henson    Reason for Consultation: Pleural effusion shortness of breath    CC: Shortness of breath with exertion    History of Present Illness:  Patient is a 67-year-old with a previous medical history of congestive heart failure CKD stage IV coronary artery disease status post CABG diabetes history of a stroke who is on chronic anticoagulation who presented to the emergency room with shortness of breath with exertion.  Apparently was unable to walk from her car to a restaurant and had to stop and catch his breath.  He denied any   pleuritic chest pain hemoptysis cough sputum production fever or chills.  He described a 20 pound weight gain.  He describes chest tightness.  He describes associated fatigue.    In the ER a chest x-ray has shown a large right pleural effusion with a smaller left.    Review of prior cardiac investigations revealed a echocardiogram that had shown an EF of 30 to 35% in 2020 however recovery of ejection fraction on echocardiogram of 2022 with EF 56%      Review of Systems:  As per HPI otherwise a 12 system review of systems performed and all else negative    Past Medical History:   Diagnosis Date   • Acquired hypothyroidism    • Acute congestive heart failure (HCC)    • Acute respiratory failure with hypoxia (HCC)    • Acute sinusitis    • SYLVAIN (acute kidney injury) (HCC)     on CKD   • Anemia    • Arthritis    • CAD (coronary artery disease)    • Cancer (HCC)     skin   • Chronic combined systolic and diastolic congestive heart failure (HCC)    • Chronic ischemic heart disease    • Chronic kidney disease (CKD)    • CKD (chronic kidney disease)    • COPD (chronic obstructive pulmonary disease) (HCC)    • Depression    • Diabetes mellitus type 2, uncontrolled, without complications    • Diabetic neuropathy (HCC)    • Disease of thyroid gland    •  Elevated cholesterol    • Fatigue    • Frequent PVCs    • Generalized weakness    • GERD (gastroesophageal reflux disease)    • Heart attack (HCC)    • History of coronary artery disease     with remote history of bypass surgery in 2001   • Hyperlipidemia    • Hypertension    • Hypertensive encephalopathy    • Mental status change     Acute   • NO DEVICE/RECALLED    • Pleural effusion    • Pneumonia    • Poor historian    • RBBB (right bundle branch block)    • Stroke (HCC)     POOR VISION   • TIA (transient ischemic attack)    • Type 2 diabetes mellitus (HCC)    • Urinary retention    • Vitamin D deficiency        Past Surgical History:   Procedure Laterality Date   • APPENDECTOMY N/A 02/14/2016    Dr. Alexey Dodson   • ARTERIOVENOUS FISTULA/SHUNT SURGERY Right 6/3/2022    Procedure: RIGHT FOREARM ARTERIOVENOUS FISTULA PLACEMENT RADIOCEPHALIC WITH CEPHALIC VEIN TRANSPOSITION;  Surgeon: Eliseo Willis MD;  Location: [facility] MAIN OR;  Service: Vascular;  Laterality: Right;   • COLONOSCOPY      over 20 years ago.  no polyps    • COLONOSCOPY N/A 9/18/2018    Procedure: COLONOSCOPY;  Surgeon: Jose Enrique Louie MD;  Location: [facility] ENDOSCOPY;  Service: Gastroenterology   • COLONOSCOPY N/A 9/19/2018    IH, EH, polyps (TAs w/low grade dysplasia)   • COLONOSCOPY N/A 6/1/2020    Procedure: COLONOSCOPY;  Surgeon: Jose Enrique Louie MD;  Location: [facility] ENDOSCOPY;  Service: Gastroenterology;  Laterality: N/A;  Pre op-Anemia, Melena, History of Polyps  Post op-To Cecum/TI, Polyp, Poor Prep   • CORONARY ARTERY BYPASS GRAFT  11/2001   • ENDOSCOPY N/A 5/29/2020    Procedure: ESOPHAGOGASTRODUODENOSCOPY with biopsies;  Surgeon: Amanda Lovelace MD;  Location: [facility] ENDOSCOPY;  Service: Gastroenterology;  Laterality: N/A;  pre-anemia, dark stools  post-esophagitis, hiatal hernia   • ENDOSCOPY N/A 9/15/2020    Procedure: ESOPHAGOGASTRODUODENOSCOPY WITH BIOPSIES;  Surgeon: Jose Enrique Louie MD;  Location: [facility]  ENDOSCOPY;  Service: Gastroenterology;  Laterality: N/A;  pre: history of melena and esophagitis  post: mild esophagitis and gastritis, small hiatal hernia   • HERNIA REPAIR Left 12/05/2019   • TONSILLECTOMY     • VASECTOMY          Medications Prior to Admission   Medication Sig Dispense Refill Last Dose   • acetaminophen (TYLENOL) 325 MG tablet Take 2 tablets by mouth Every 4 (Four) Hours As Needed for Mild Pain.   Past Month   • amLODIPine (NORVASC) 5 MG tablet Take 1 tablet by mouth Daily.   3/6/2023   • apixaban (ELIQUIS) 5 MG tablet tablet Take 1 tablet by mouth Every 12 (Twelve) Hours. Indications: Atrial Fibrillation 60 tablet  3/6/2023   • buPROPion XL (WELLBUTRIN XL) 150 MG 24 hr tablet Take 2 tablets by mouth Daily.   3/6/2023   • carvedilol (COREG) 3.125 MG tablet Take 1 tablet by mouth 2 (Two) Times a Day With Meals.   Patient Taking Differently   • famotidine (PEPCID) 10 MG tablet Take 1 tablet by mouth 2 (Two) Times a Day.   3/6/2023   • furosemide (LASIX) 40 MG tablet Take 10 mg by mouth Daily.   Patient Taking Differently   • hydrALAZINE (APRESOLINE) 25 MG tablet Take 1 tablet by mouth 3 (Three) Times a Day.   3/6/2023   • ipratropium-albuterol (COMBIVENT RESPIMAT)  MCG/ACT inhaler Inhale 1 puff 4 (Four) Times a Day As Needed for Wheezing.      • levothyroxine (SYNTHROID, LEVOTHROID) 75 MCG tablet Take 1 tablet by mouth Daily. 30 tablet 5 3/6/2023   • rosuvastatin (CRESTOR) 20 MG tablet Take 2 tablets by mouth Every Night.   3/6/2023   • tamsulosin (FLOMAX) 0.4 MG capsule 24 hr capsule Take 1 capsule by mouth Daily.   Patient Taking Differently   • aspirin 81 MG chewable tablet Chew 1 tablet Daily. (Patient not taking: Reported on 3/7/2023)   Not Taking   • atorvastatin (LIPITOR) 80 MG tablet Take 1 tablet by mouth Every Night. (Patient not taking: Reported on 3/7/2023) 90 tablet  Not Taking   • Cholecalciferol (VITAMIN D3) 5000 units capsule capsule Take 1 capsule by mouth Daily. (Patient not  taking: Reported on 3/7/2023)   Not Taking   • folic acid (FOLVITE) 1 MG tablet Take 1,000 mcg by mouth Daily. (Patient not taking: Reported on 3/7/2023)   Not Taking   • Insulin Glargine (BASAGLAR KWIKPEN) 100 UNIT/ML injection pen Inject 4 Units under the skin into the appropriate area as directed Every Night. Patient says he does not take consistently (Patient taking differently: Inject 4 Units under the skin into the appropriate area as directed Every Night.) 15 mL 3    • insulin glulisine (Apidra) 100 UNIT/ML injection Inject 2 Units under the skin into the appropriate area as directed 3 (Three) Times a Day Before Meals. Patient says he does not take consistently (Patient taking differently: Inject 2 Units under the skin into the appropriate area as directed 3 (Three) Times a Day Before Meals. HAS NOT STARTED TAKING YET, WAITING FOR SUPPLIES) 15 mL 3    • insulin lispro (ADMELOG) 100 UNIT/ML injection Inject 0-7 Units under the skin into the appropriate area as directed 3 (Three) Times a Day Before Meals.  12    • lisinopril (PRINIVIL,ZESTRIL) 20 MG tablet Take 20 mg by mouth Daily. (Patient not taking: Reported on 3/7/2023)   Not Taking   • pantoprazole (PROTONIX) 40 MG EC tablet Take 1 tablet by mouth Daily. (Patient not taking: Reported on 3/7/2023)   Not Taking   • polyethylene glycol (polyethylene glycol) 17 g packet Take 17 g by mouth Daily As Needed (constipation). (Patient not taking: Reported on 3/7/2023)   Not Taking   • torsemide (DEMADEX) 10 MG tablet 4 tablets Daily. (Patient not taking: Reported on 3/7/2023)   Not Taking   • vitamin C (ASCORBIC ACID) 250 MG tablet Take 4 tablets by mouth Daily. (Patient not taking: Reported on 3/7/2023)   Not Taking       Allergies   Allergen Reactions   • Prozac [Fluoxetine Hcl] Other (See Comments)     shaking       Social History     Socioeconomic History   • Marital status:      Spouse name: Lissette   • Number of children: 3   • Years of education: college  "  Tobacco Use   • Smoking status: Never   • Smokeless tobacco: Never   • Tobacco comments:     daily caffeine   Vaping Use   • Vaping Use: Never used   Substance and Sexual Activity   • Alcohol use: No     Comment: last drink 2 months ago   • Drug use: No   • Sexual activity: Defer       Family History   Problem Relation Age of Onset   • Diabetes Mother    • Hypertension Mother    • Macular degeneration Mother    • Alcohol abuse Father    • Cancer Father         lung,  age 65   • Heart disease Father    • Alcohol abuse Brother    • Cirrhosis Brother          age 50   • Liver cancer Brother 45   • Diabetes Son    • Kidney failure Son    • Autism Son    • Malig Hyperthermia Neg Hx        Physical Exam:  /84 (BP Location: Left arm, Patient Position: Lying)   Pulse 68   Temp 97.8 °F (36.6 °C) (Oral)   Resp 18   Ht 182.9 cm (72\")   Wt 74.6 kg (164 lb 7.4 oz)   SpO2 100%   BMI 22.31 kg/m²   Body mass index is 22.31 kg/m².   General appearance: Nontoxic conversant actually has pursed lip expiratory breathing  Eyes: anicteric sclerae, moist conjunctivae; no lid-lag;    HENT: Atraumatic; oropharynx clear with moist mucous membranes and no mucosal ulcerations; normal hard and soft palate  Neck: Trachea midline; supple  Lungs: Diminished right greater than left bases no wheeze or rhonchi, with slightly increased respiratory effort and no intercostal retractions  CV: RRR, no rub  Abdomen: Soft, non-tender; no masses    Extremities: No peripheral edema or extremity lymphadenopathy  Skin: Normal temperature, turgor and texture; no rash, ulcers or subcutaneous nodules  Psych: Appropriate affect, alert and oriented  Neuro cranials 2 through 12 gross intact speech intact moves extremities  LABS:  Results from last 7 days   Lab Units 2345 239   WBC 10*3/mm3 5.02 4.84   HEMOGLOBIN g/dL 7.8* 8.5*   PLATELETS 10*3/mm3 194 233     Results from last 7 days   Lab Units 2345 " 03/06/23  2139   SODIUM mmol/L 140 140   POTASSIUM mmol/L 4.6 4.3   CHLORIDE mmol/L 110* 110*   CO2 mmol/L 20.7* 21.0*   BUN mg/dL 44* 41*   CREATININE mg/dL 3.55* 3.70*   GLUCOSE mg/dL 112* 208*   CALCIUM mg/dL 7.9* 8.5*   Estimated Creatinine Clearance: 21.3 mL/min (A) (by C-G formula based on SCr of 3.55 mg/dL (H)).    Imaging: I personally visualized the images of scans/x-rays performed within last 3 days.  Imaging Results (Most Recent)     Procedure Component Value Units Date/Time    CT Chest Without Contrast Diagnostic [[id number]] Collected: 03/07/23 0058     Updated: 03/07/23 0107    Narrative:      CT CHEST WITHOUT CONTRAST     HISTORY: Shortness of breath     COMPARISON: None available.     TECHNIQUE: Axial CT imaging was obtained through the thorax. No IV  contrast was administered.     FINDINGS:  The patient has a large right pleural effusion, as well as a small left  pleural effusion. Thyroid gland, trachea, and esophagus appear  unremarkable. There are coronary artery calcifications. There is marked  the main pulmonary artery, which can be seen in setting of pulmonary  arterial hypertension. Thoracic aorta is normal in caliber. There are  shotty mediastinal lymph nodes. For example, a lower right paratracheal  node measures up to 1.2 cm in short axis dimensions. Subcarinal node  measures up to 1.5 cm in short axis dimensions. The patient is noted to  have right basilar consolidation. This favored to be secondary to  compressive atelectasis. There is also left basilar atelectasis. There  is a hyperdense appearance to the interventricular septum, which can be  seen in the setting of anemia. Images through the upper abdomen do not  demonstrate any acute abnormalities. Patient does have some body wall  edema. No acute osseous abnormalities are seen.       Impression:         1. Large right pleural effusion and small left pleural effusion.  Consolidation at the right lung base is favored to be secondary  to  atelectasis. However, follow-up exam is suggested. There is also some  milder left basilar atelectasis.     Radiation dose reduction techniques were utilized, including automated  exposure control and exposure modulation based on body size.     This report was finalized on 3/7/2023 1:04 AM by Dr. Alice Allen M.D.       XR Chest 2 View [[id number]] Collected: 03/06/23 2200     Updated: 03/06/23 2204    Narrative:      PA AND LATERAL CHEST RADIOGRAPH     HISTORY: Shortness of air     COMPARISON: 12/01/2022     FINDINGS:  Patient is status post median sternotomy with coronary artery bypass  grafting. There is increasing elevation of the right hemidiaphragm.  There is right basilar consolidation and right pleural effusion, new  when compared to prior exam. There is cardiomegaly There is vascular  congestion. Patient also appears to have a small left pleural effusion.  No pneumothorax is seen. There is some gaseous distention of bowel  within the upper abdomen. There is calcification of the aorta.       Impression:         1. Increasing consolidation at the right lung base. Some of this may be  related to atelectasis. Pneumonia is not excluded.  2. Cardiomegaly with vascular congestion.     This report was finalized on 3/6/2023 10:01 PM by Dr. Alice Allen M.D.             Assessment / Recommendations:  Acute decompensated diastolic heart failure  COPD without acute exacerbation  Atelectasis  Bilateral pleural effusions  Acute on CKD stage IV  Anemia  Hypertension  History of CVA on Eliquis  Diabetes  Coronary artery disease status post CABG      Patient has never had formal pulmonary function testing use the Combivent as needed no daily inhaler however he does have some pursed lip breathing question severity of his underlying pulmonary function and he needs formal outpatient pulmonary function testing to help benefit from potentially further bronchodilator therapy.    He has a 20 pound weight gain he has  bilateral pleural effusions.  This seems like acute decompensated diastolic heart failure.  Patient is being diuresed by nephrology and followed by cardiology.  Defer if ischemic evaluation is needed by cardiology.  Do not believe we need steroids at this time.    Recommend outpatient follow-up with full pulmonary function testing once his acute decompensated heart failure is treated.    Suspect would benefit from diagnostic and therapeutic thoracentesis on right side.  Discussed this with the patient has had these before he wishes to proceed.    We will send off studies on this.    Monitor for any fever patient has no leukocytosis no productive cough no fever and procalcitonin is negative would not start antibiotics at this time.         Patient seen by [facility] night coverage. Care will be assumed by another physician in the group in the morning. Please call our answering service at [phone] with any concerns.        Jean-Paul García MD  [facility]  03/07/23  21:18 EST

## 2023-03-08 NOTE — THERAPY EVALUATION
Patient Name: Carlito Mo  : 1955    MRN: [id number]                              Today's Date: 3/8/2023       Admit Date: 3/6/2023    Visit Dx:     ICD-10-CM ICD-9-CM   1. Shortness of breath  R06.02 786.05   2. Pleural effusion, bilateral  J90 511.9   3. Acute renal failure superimposed on chronic kidney disease, unspecified CKD stage, unspecified acute renal failure type ([facility])  N17.9 584.9    N18.9 585.9   4. Anemia, unspecified type  D64.9 285.9     Patient Active Problem List   Diagnosis   • Major depressive disorder with single episode, in partial remission ([facility])   • Other hyperlipidemia   • Type 2 diabetes mellitus with stage 5 chronic kidney disease not on chronic dialysis, with long-term current use of insulin ([facility])   • Vitamin D deficiency   • Erectile dysfunction   • Chronic fatigue   • Type 2 diabetes mellitus with ophthalmic complication ([facility])   • Skin lesion of chest wall   • Slow transit constipation   • Benign prostatic hyperplasia with nocturia   • History of basal cell carcinoma   • High blood pressure associated with diabetes ([facility])   • Acquired hypothyroidism   • Hypertensive urgency   • Recurrent strokes ([facility])   • Noncompliance w/medication treatment due to intermit use of medication   • Urinary retention   • Pneumonia   • Acute on chronic combined systolic and diastolic congestive heart failure ([facility])   • Acute respiratory failure with hypoxia ([facility])   • Suspected sleep apnea   • Pleural effusion   • YAW (obstructive sleep apnea)   • Coronary artery disease involving native coronary artery of native heart without angina pectoris   • Chronically elevated troponin   • Colon cancer screening   • Enlarged lymph nodes   • Functional gait abnormality   • History of myocardial infarction   • Hospital discharge follow-up   • Insomnia   • Iron deficiency anemia   • Major depression, recurrent ([facility])   • Anemia, chronic renal failure, stage 3 (moderate) ([facility])   • Essential hypertension   • Adverse  effect of iron and its compounds, initial encounter   • Acute on chronic renal insufficiency   • Debility   • Falls frequently   • Acute pain of right shoulder   • Acute on chronic anemia   • Heme positive stool   • Neurogenic orthostatic hypotension (HCC)   • Anemia, chronic disease   • History of colon polyps   • Helicobacter pylori gastritis   • Esophagitis   • Sleep related hypoxia   • Embolic stroke (HCC)   • Patent foramen ovale   • Pleural effusion, bilateral   • Cardiomyopathy (HCC)   • Lower abdominal pain   • Diarrhea   • CKD (chronic kidney disease) stage 4, GFR 15-29 ml/min (HCC)   • Hypertension not at goal   • Memory loss due to medical condition   • Generalized weakness   • ERRONEOUS ENCOUNTER--DISREGARD   • Weakness   • Paroxysmal atrial fibrillation (HCC)   • Chronic anticoagulation   • Multiple falls   • Dehydration   • SYLVAIN (acute kidney injury) (HCC)   • Acute ischemic stroke (HCC)   • DE LEON (dyspnea on exertion)   • Shortness of breath     Past Medical History:   Diagnosis Date   • Acquired hypothyroidism    • Acute congestive heart failure (HCC)    • Acute respiratory failure with hypoxia (HCC)    • Acute sinusitis    • SYLVAIN (acute kidney injury) (HCC)     on CKD   • Anemia    • Arthritis    • CAD (coronary artery disease)    • Cancer (HCC)     skin   • Chronic combined systolic and diastolic congestive heart failure (HCC)    • Chronic ischemic heart disease    • Chronic kidney disease (CKD)    • CKD (chronic kidney disease)    • COPD (chronic obstructive pulmonary disease) (HCC)    • Depression    • Diabetes mellitus type 2, uncontrolled, without complications    • Diabetic neuropathy (HCC)    • Disease of thyroid gland    • Elevated cholesterol    • Fatigue    • Frequent PVCs    • Generalized weakness    • GERD (gastroesophageal reflux disease)    • Heart attack (HCC)    • History of coronary artery disease     with remote history of bypass surgery in 2001   • Hyperlipidemia    • Hypertension     • Hypertensive encephalopathy    • Mental status change     Acute   • NO DEVICE/RECALLED    • Pleural effusion    • Pneumonia    • Poor historian    • RBBB (right bundle branch block)    • Stroke (HCC)     POOR VISION   • TIA (transient ischemic attack)    • Type 2 diabetes mellitus (HCC)    • Urinary retention    • Vitamin D deficiency      Past Surgical History:   Procedure Laterality Date   • APPENDECTOMY N/A 02/14/2016    Dr. Alexey Dodson   • ARTERIOVENOUS FISTULA/SHUNT SURGERY Right 6/3/2022    Procedure: RIGHT FOREARM ARTERIOVENOUS FISTULA PLACEMENT RADIOCEPHALIC WITH CEPHALIC VEIN TRANSPOSITION;  Surgeon: Eliseo Willis MD;  Location: [facility] MAIN OR;  Service: Vascular;  Laterality: Right;   • COLONOSCOPY      over 20 years ago.  no polyps    • COLONOSCOPY N/A 9/18/2018    Procedure: COLONOSCOPY;  Surgeon: Jose Enrique Louie MD;  Location: [facility] ENDOSCOPY;  Service: Gastroenterology   • COLONOSCOPY N/A 9/19/2018    IH, EH, polyps (TAs w/low grade dysplasia)   • COLONOSCOPY N/A 6/1/2020    Procedure: COLONOSCOPY;  Surgeon: Jose Enrique Louie MD;  Location: [facility] ENDOSCOPY;  Service: Gastroenterology;  Laterality: N/A;  Pre op-Anemia, Melena, History of Polyps  Post op-To Cecum/TI, Polyp, Poor Prep   • CORONARY ARTERY BYPASS GRAFT  11/2001   • ENDOSCOPY N/A 5/29/2020    Procedure: ESOPHAGOGASTRODUODENOSCOPY with biopsies;  Surgeon: Amanda Lovelace MD;  Location: [facility] ENDOSCOPY;  Service: Gastroenterology;  Laterality: N/A;  pre-anemia, dark stools  post-esophagitis, hiatal hernia   • ENDOSCOPY N/A 9/15/2020    Procedure: ESOPHAGOGASTRODUODENOSCOPY WITH BIOPSIES;  Surgeon: Jose Enrique Louie MD;  Location: [facility] ENDOSCOPY;  Service: Gastroenterology;  Laterality: N/A;  pre: history of melena and esophagitis  post: mild esophagitis and gastritis, small hiatal hernia   • HERNIA REPAIR Left 12/05/2019   • TONSILLECTOMY     • VASECTOMY        General Information     Row Name 03/08/23 1053           OT Time and Intention    Document Type evaluation  -RB     Mode of Treatment co-treatment;physical therapy;occupational therapy  -RB     Row Name 03/08/23 1355          General Information    Patient Profile Reviewed yes  -RB     Prior Level of Function independent:;ADL's;transfer  -RB     Existing Precautions/Restrictions fall  -RB     Barriers to Rehab none identified  -RB     Row Name 03/08/23 1355          Living Environment    People in Home child(carlos), adult;spouse  -RB     Row Name 03/08/23 1355          Home Main Entrance    Number of Stairs, Main Entrance three  -RB     Row Name 03/08/23 1355          Stairs Within Home, Primary    Stairs, Within Home, Primary tri level home  -RB     Row Name 03/08/23 1355          Cognition    Orientation Status (Cognition) oriented x 4  -RB     Row Name 03/08/23 1355          Safety Issues, Functional Mobility    Safety Issues Affecting Function (Mobility) safety precautions follow-through/compliance;safety precaution awareness;awareness of need for assistance;insight into deficits/self-awareness  -RB     Impairments Affecting Function (Mobility) balance;endurance/activity tolerance;strength  -RB           User Key  (r) = Recorded By, (t) = Taken By, (c) = Cosigned By    Initials Name Provider Type    RB Katie Starr OT Occupational Therapist                 Mobility/ADL's     Row Name 03/08/23 1356          Bed Mobility    Bed Mobility supine-sit  -RB     Supine-Sit Trigg (Bed Mobility) verbal cues;standby assist  -RB     Assistive Device (Bed Mobility) bed rails  -RB     Row Name 03/08/23 1356          Transfers    Transfers sit-stand transfer;stand-sit transfer  -RB     Row Name 03/08/23 1356          Sit-Stand Transfer    Sit-Stand Trigg (Transfers) standby assist;verbal cues  -RB     Assistive Device (Sit-Stand Transfers) walker, front-wheeled  -RB     Row Name 03/08/23 1356          Stand-Sit Transfer    Stand-Sit Trigg (Transfers)  standby assist;verbal cues  -RB     Assistive Device (Stand-Sit Transfers) walker, front-wheeled  -RB     Row Name 03/08/23 1356          Functional Mobility    Functional Mobility- Ind. Level verbal cues required;standby assist  -RB     Functional Mobility- Safety Issues step length decreased  -RB     Functional Mobility- Comment slow and steady - pt fatigued and took standing rest breaks PRN. forward flexed posture with the walker.  -RB     Row Name 03/08/23 1356          Activities of Daily Living    BADL Assessment/Intervention lower body dressing;grooming;toileting  -RB     Row Name 03/08/23 1356          Lower Body Dressing Assessment/Training    [place] Level (Lower Body Dressing) lower body dressing skills;set up  -RB     Position (Lower Body Dressing) supported sitting  -RB     Row Name 03/08/23 1356          Grooming Assessment/Training    [place] Level (Grooming) grooming skills;set up  -RB     Position (Grooming) sink side;supported standing  -RB     Row Name 03/08/23 1356          Toileting Assessment/Training    [place] Level (Toileting) toileting skills;contact guard assist  -RB     Position (Toileting) supported sitting;supported standing  -RB           User Key  (r) = Recorded By, (t) = Taken By, (c) = Cosigned By    Initials Name Provider Type    RB Katie Starr OT Occupational Therapist               Obj/Interventions     Row Name 03/08/23 1358          Sensory Assessment (Somatosensory)    Sensory Assessment (Somatosensory) sensation intact  -RB     Row Name 03/08/23 1358          Vision Assessment/Intervention    Visual Impairment/Limitations WFL  -RB     Row Name 03/08/23 1358          Range of Motion Comprehensive    General Range of Motion no range of motion deficits identified  -RB     Row Name 03/08/23 1358          Strength Comprehensive (MMT)    Comment, General Manual Muscle Testing (MMT) Assessment generalized weakness- BUE grossly equal at 3+/5  -RB     Row Name  03/08/23 1358          Balance    Comment, Balance SBA/CGA for OOB standing balance + rwx  -RB           User Key  (r) = Recorded By, (t) = Taken By, (c) = Cosigned By    Initials Name Provider Type    Katie Perez, OT Occupational Therapist               Goals/Plan     Row Name 03/08/23 1359          Bed Mobility Goal 1 (OT)    Activity/Assistive Device (Bed Mobility Goal 1, OT) bed mobility activities, all  -RB     [place] Level/Cues Needed (Bed Mobility Goal 1, OT) supervision required  -RB     Time Frame (Bed Mobility Goal 1, OT) short term goal (STG);2 weeks  -RB     Progress/Outcomes (Bed Mobility Goal 1, OT) continuing progress toward goal  -RB     Row Name 03/08/23 1359          Transfer Goal 1 (OT)    Activity/Assistive Device (Transfer Goal 1, OT) transfers, all  -RB     [place] Level/Cues Needed (Transfer Goal 1, OT) supervision required  -RB     Time Frame (Transfer Goal 1, OT) short term goal (STG);2 weeks  -RB     Progress/Outcome (Transfer Goal 1, OT) continuing progress toward goal  -RB     Row Name 03/08/23 [facility]9          Dressing Goal 1 (OT)    Activity/Device (Dressing Goal 1, OT) dressing skills, all  -RB     [place]/Cues Needed (Dressing Goal 1, OT) supervision required  -RB     Time Frame (Dressing Goal 1, OT) short term goal (STG);2 weeks  -RB     Progress/Outcome (Dressing Goal 1, OT) continuing progress toward goal  -RB     Row Name 03/08/23 1359          Toileting Goal 1 (OT)    Activity/Device (Toileting Goal 1, OT) toileting skills, all  -RB     [place] Level/Cues Needed (Toileting Goal 1, OT) supervision required  -RB     Progress/Outcome (Toileting Goal 1, OT) continuing progress toward goal  -RB     Row Name 03/08/23 1359          Grooming Goal 1 (OT)    Activity/Device (Grooming Goal 1, OT) grooming skills, all  -RB     [place] (Grooming Goal 1, OT) supervision required  -RB     Time Frame (Grooming Goal 1, OT) short term goal (STG);2 weeks  -RB      Progress/Outcome (Grooming Goal 1, OT) continuing progress toward goal  -RB     Row Name 03/08/23 1355          Therapy Assessment/Plan (OT)    Planned Therapy Interventions (OT) transfer/mobility retraining;strengthening exercise;ROM/therapeutic exercise;activity tolerance training;adaptive equipment training;BADL retraining;functional balance retraining;occupation/activity based interventions;patient/caregiver education/training;neuromuscular control/coordination retraining;cognitive/visual perception retraining  -RB           User Key  (r) = Recorded By, (t) = Taken By, (c) = Cosigned By    Initials Name Provider Type    RB Katie Starr, DANGELO Occupational Therapist               Clinical Impression     Row Name 03/08/23 1358          Pain Assessment    Pretreatment Pain Rating 0/10 - no pain  -RB     Posttreatment Pain Rating 0/10 - no pain  -RB     Row Name 03/08/23 1354          Plan of Care Review    Plan of Care Reviewed With patient  -RB     Progress no change  -RB     Row Name 03/08/23 1352          Therapy Assessment/Plan (OT)    Rehab Potential (OT) good, to achieve stated therapy goals  -RB     Criteria for Skilled Therapeutic Interventions Met (OT) yes;skilled treatment is necessary  -RB     Therapy Frequency (OT) 3 times/wk  -RB     Row Name 03/08/23 1351          Therapy Plan Review/Discharge Plan (OT)    Anticipated Discharge Disposition (OT) home with assist;home with home health;home with outpatient therapy services  -RB     Row Name 03/08/23 1358          Vital Signs    O2 Delivery Pre Treatment room air  -RB     O2 Delivery Intra Treatment room air  -RB     O2 Delivery Post Treatment room air  -RB     Pre Patient Position Supine  -RB     Intra Patient Position Standing  -RB     Post Patient Position Sitting  -RB     Row Name 03/08/23 1358          Positioning and Restraints    Pre-Treatment Position in bed  -RB     Post Treatment Position chair  -RB     In Chair notified  nsg;reclined;encouraged to call for assist;exit alarm on;call light within reach;sitting;legs elevated  -           User Key  (r) = Recorded By, (t) = Taken By, (c) = Cosigned By    Initials Name Provider Type    Katie Perez OT Occupational Therapist               Outcome Measures     Row Name 03/08/23 1400          How much help from another is currently needed...    Putting on and taking off regular lower body clothing? 3  -RB     Bathing (including washing, rinsing, and drying) 3  -RB     Toileting (which includes using toilet bed pan or urinal) 3  -RB     Putting on and taking off regular upper body clothing 3  -RB     Taking care of personal grooming (such as brushing teeth) 3  -RB     Eating meals 3  -     AM-PAC 6 Clicks Score (OT) 18  -     Row Name 03/08/23 1212          How much help from another person do you currently need...    Turning from your back to your side while in flat bed without using bedrails? 4  -BH     Moving from lying on back to sitting on the side of a flat bed without bedrails? 3  -BH     Moving to and from a bed to a chair (including a wheelchair)? 3  -BH     Standing up from a chair using your arms (e.g., wheelchair, bedside chair)? 4  -BH     Climbing 3-5 steps with a railing? 3  -BH     To walk in hospital room? 3  -     AM-PAC 6 Clicks Score (PT) 20  -BH     Highest level of mobility 6 --> Walked 10 steps or more  -     Row Name 03/08/23 1400          Modified Ke[place] Scale    Modified [place] Scale 4 - Moderately severe disability.  Unable to walk without assistance, and unable to attend to own bodily needs without assistance.  -     Row Name 03/08/23 1400 03/08/23 1212       Functional Assessment    Outcome Measure Options AM-PAC 6 Clicks Daily Activity (OT);Modified Keren  - AM-PAC 6 Clicks Basic Mobility (PT)  -          User Key  (r) = Recorded By, (t) = Taken By, (c) = Cosigned By    Initials Name Provider Type    Katie Perez OT Occupational  Therapist    Consuelo Avalos, PT Physical Therapist                Occupational Therapy Education     Title: PT OT SLP Therapies (In Progress)     Topic: Occupational Therapy (Not Started)     Point: ADL training (Not Started)     Description:   Instruct learner(s) on proper safety adaptation and remediation techniques during self care or transfers.   Instruct in proper use of assistive devices.              Learner Progress:  Not documented in this visit.          Point: Home exercise program (Not Started)     Description:   Instruct learner(s) on appropriate technique for monitoring, assisting and/or progressing therapeutic exercises/activities.              Learner Progress:  Not documented in this visit.          Point: Precautions (Not Started)     Description:   Instruct learner(s) on prescribed precautions during self-care and functional transfers.              Learner Progress:  Not documented in this visit.          Point: Body mechanics (Not Started)     Description:   Instruct learner(s) on proper positioning and spine alignment during self-care, functional mobility activities and/or exercises.              Learner Progress:  Not documented in this visit.                          OT Recommendation and Plan  Planned Therapy Interventions (OT): transfer/mobility retraining, strengthening exercise, ROM/therapeutic exercise, activity tolerance training, adaptive equipment training, BADL retraining, functional balance retraining, occupation/activity based interventions, patient/caregiver education/training, neuromuscular control/coordination retraining, cognitive/visual perception retraining  Therapy Frequency (OT): 3 times/wk  Plan of Care Review  Plan of Care Reviewed With: patient  Progress: no change     Time Calculation:    Time Calculation- OT     Row Name 03/08/23 1355 03/08/23 1212          Time Calculation- OT    OT Start Time 1052  -RB --     OT Stop Time 1114  -RB --     OT Time Calculation  (min) 22 min  -RB --     Total Timed Code Minutes- OT 12 minute(s)  -RB --     OT Received On 03/08/23  -RB --     OT - Next Appointment 03/10/23  -RB --     OT Goal Re-Cert Due Date 03/22/23  -RB --        Timed Charges    95587 - Gait Training Minutes  -- 8  -BH     98526 - OT Self Care/Mgmt Minutes 12  -RB --        Untimed Charges    OT Eval/Re-eval Minutes 10  -RB --        Total Minutes    Timed Charges Total Minutes 12  -RB 8  -BH     Untimed Charges Total Minutes 10  -RB --      Total Minutes 22  -RB 8  -BH           User Key  (r) = Recorded By, (t) = Taken By, (c) = Cosigned By    Initials Name Provider Type    Katie Perez OT Occupational Therapist     Consueol Andrews, PT Physical Therapist              Therapy Charges for Today     Code Description Service Date Service Provider Modifiers Qty    18982532737 HC OT SELF CARE/MGMT/TRAIN EA 15 MIN 3/8/2023 Katie Starr OT GO 1    48655186855 HC OT EVAL MOD COMPLEXITY 2 3/8/2023 Katie Starr OT GO 1               Katie Starr OT  3/8/2023

## 2023-03-08 NOTE — PROGRESS NOTES
LOS: 0 days   Patient Care Team:  Florencia Caballero MD as PCP - General (Internal Medicine)  Amber Murguia MD as Consulting Physician (Cardiology)  [facility] as Pharmacist  Seth Ladd MD as Surgeon (General Surgery)  Kim Hoyos MD PhD as Consulting Physician (Hematology and Oncology)  Jerome Diallo MD as Referring Physician (Family Medicine)  Jose Enrique Louie MD as Consulting Physician (Gastroenterology)  Nima Huddleston MD as Consulting Physician (Nephrology)    Chief Complaint: Follow-up for acute on chronic combined CHF, cardiomyopathy, CAD.    Interval History: Echocardiogram findings are noted below.  Ejection fraction was 40 to 45%, and there was likely pulmonary hypertension with an RVSP of 59.  He does feel better and diuresed well.  He is less short of breath.  No chest pain.    Vital Signs:  Temp:  [97.6 °F (36.4 °C)-98.1 °F (36.7 °C)] 97.6 °F (36.4 °C)  Heart Rate:  [68-76] 68  Resp:  [16-20] 18  BP: (143-158)/(67-84) 145/68    Intake/Output Summary (Last 24 hours) at 3/8/2023 1426  Last data filed at 3/8/2023 1159  Gross per 24 hour   Intake 1080 ml   Output 4300 ml   Net -3220 ml       Physical Exam:   General Appearance:    No acute distress, alert and oriented x4   Lungs:     Faint rales at bases (improved)     Heart:    Regular rhythm and normal rate.  No murmurs, gallops, or       rubs.   Abdomen:     Soft, nontender, nondistended.    Extremities:   No clubbing, cyanosis, or edema.     Results Review:    Results from last 7 days   Lab Units 03/08/23  0504   SODIUM mmol/L 139   POTASSIUM mmol/L 5.2   CHLORIDE mmol/L 108*   CO2 mmol/L 19.5*   BUN mg/dL 51*   CREATININE mg/dL 3.65*   GLUCOSE mg/dL 109*   CALCIUM mg/dL 8.5*     Results from last 7 days   Lab Units 03/07/23  1156 03/07/23  0545 03/06/23  2139   HSTROP T ng/L 93* 88* 101*     Results from last 7 days   Lab Units 03/08/23  0504   WBC 10*3/mm3 6.14   HEMOGLOBIN g/dL 8.5*   HEMATOCRIT % 27.4*    PLATELETS 10*3/mm3 188                       I reviewed the patient's new clinical results.        Assessment:  1.  Acute on chronic combined CHF  2.  Cardiomyopathy - EF 40-45%  3.  Acute kidney injury with stage IV chronic kidney disease  4.  Coronary artery disease, status post CABG in 2001  5.  Anemia of chronic disease  6.  Large right pleural effusion - status post 2 liter thoracentesis on 3/8/2023  7.  Hypertension  8.  History of embolic CVA, on Eliquis  9.  Elevated RVSP (likely pulmonary hypertension)  10.  Diabetes    Plan:  - Echocardiogram reviewed.  There were wall motion abnormalities of the distal and mid inferoseptum, distal inferior wall, and inferior apex.  This suggests an RCA distribution, although he cannot have a heart catheterization currently.  He would very likely end up on dialysis, and he is not having any anginal symptoms.    -The echocardiogram also had restrictive diastolic dysfunction.  There was elevated RVSP of 59, suggestive of pulmonary hypertension.  This is undoubtedly from his left-sided combined heart failure.    -Defer diuretics to nephrology.  Started on Lasix 60 mg twice a day.    -Large volume 2 L right thoracentesis earlier today.    -Continue aspirin, Lipitor, and Coreg.  He cannot be on an ACE inhibitor, ARB, or spironolactone because of his renal function.    Mauricio Corona MD  03/08/23  14:26 EST

## 2023-03-08 NOTE — PLAN OF CARE
The pt was admitted to [facility] 2/2 to SOA -  B pleural effusions (s/p thoracenteses w/ removed 2L of fluid). Pt lives with his wife and 2 adult sons and he is independent with a cane and walker. Today, the pt completed OOB ADL's at sinkside and toileting with SBA/CGA. No LOB, slow and steady pacing with the use of a walker. He reports feeling weaker than his baseline. Encouraged him to continue OOB activity with assistance of nursing staff and therapy will keep him on caseload while he is admitted. He plans to continue OP PT at d/c.    Patient was not wearing a face mask during this therapy encounter. Therapist used appropriate personal protective equipment including mask and gloves. Mask used was standard procedure mask. Appropriate PPE was worn during the entire therapy session. Hand hygiene was completed before and after therapy session. Patient is not in enhanced droplet precautions.

## 2023-03-08 NOTE — PLAN OF CARE
Goal Outcome Evaluation:  Plan of Care Reviewed With: patient        Progress: no change   New admit from ED. VSS on RA. Strict Is &Os. Good urine output. Plan thoracentesis in AM. Consent signed and in chart. NPO at midnight. PTA med list updated.

## 2023-03-08 NOTE — THERAPY EVALUATION
Patient Name: Carlito Mo  : 1955    MRN: [id number]                              Today's Date: 3/8/2023       Admit Date: 3/6/2023    Visit Dx:     ICD-10-CM ICD-9-CM   1. Shortness of breath  R06.02 786.05   2. Pleural effusion, bilateral  J90 511.9   3. Acute renal failure superimposed on chronic kidney disease, unspecified CKD stage, unspecified acute renal failure type ([facility])  N17.9 584.9    N18.9 585.9   4. Anemia, unspecified type  D64.9 285.9     Patient Active Problem List   Diagnosis   • Major depressive disorder with single episode, in partial remission ([facility])   • Other hyperlipidemia   • Type 2 diabetes mellitus with stage 5 chronic kidney disease not on chronic dialysis, with long-term current use of insulin ([facility])   • Vitamin D deficiency   • Erectile dysfunction   • Chronic fatigue   • Type 2 diabetes mellitus with ophthalmic complication ([facility])   • Skin lesion of chest wall   • Slow transit constipation   • Benign prostatic hyperplasia with nocturia   • History of basal cell carcinoma   • High blood pressure associated with diabetes ([facility])   • Acquired hypothyroidism   • Hypertensive urgency   • Recurrent strokes ([facility])   • Noncompliance w/medication treatment due to intermit use of medication   • Urinary retention   • Pneumonia   • Acute on chronic combined systolic and diastolic congestive heart failure ([facility])   • Acute respiratory failure with hypoxia ([facility])   • Suspected sleep apnea   • Pleural effusion   • YAW (obstructive sleep apnea)   • Coronary artery disease involving native coronary artery of native heart without angina pectoris   • Chronically elevated troponin   • Colon cancer screening   • Enlarged lymph nodes   • Functional gait abnormality   • History of myocardial infarction   • Hospital discharge follow-up   • Insomnia   • Iron deficiency anemia   • Major depression, recurrent ([facility])   • Anemia, chronic renal failure, stage 3 (moderate) ([facility])   • Essential hypertension   • Adverse  effect of iron and its compounds, initial encounter   • Acute on chronic renal insufficiency   • Debility   • Falls frequently   • Acute pain of right shoulder   • Acute on chronic anemia   • Heme positive stool   • Neurogenic orthostatic hypotension (HCC)   • Anemia, chronic disease   • History of colon polyps   • Helicobacter pylori gastritis   • Esophagitis   • Sleep related hypoxia   • Embolic stroke (HCC)   • Patent foramen ovale   • Pleural effusion, bilateral   • Cardiomyopathy (HCC)   • Lower abdominal pain   • Diarrhea   • CKD (chronic kidney disease) stage 4, GFR 15-29 ml/min (HCC)   • Hypertension not at goal   • Memory loss due to medical condition   • Generalized weakness   • ERRONEOUS ENCOUNTER--DISREGARD   • Weakness   • Paroxysmal atrial fibrillation (HCC)   • Chronic anticoagulation   • Multiple falls   • Dehydration   • SYLVAIN (acute kidney injury) (HCC)   • Acute ischemic stroke (HCC)   • DE LENO (dyspnea on exertion)   • Shortness of breath     Past Medical History:   Diagnosis Date   • Acquired hypothyroidism    • Acute congestive heart failure (HCC)    • Acute respiratory failure with hypoxia (HCC)    • Acute sinusitis    • SYLVAIN (acute kidney injury) (HCC)     on CKD   • Anemia    • Arthritis    • CAD (coronary artery disease)    • Cancer (HCC)     skin   • Chronic combined systolic and diastolic congestive heart failure (HCC)    • Chronic ischemic heart disease    • Chronic kidney disease (CKD)    • CKD (chronic kidney disease)    • COPD (chronic obstructive pulmonary disease) (HCC)    • Depression    • Diabetes mellitus type 2, uncontrolled, without complications    • Diabetic neuropathy (HCC)    • Disease of thyroid gland    • Elevated cholesterol    • Fatigue    • Frequent PVCs    • Generalized weakness    • GERD (gastroesophageal reflux disease)    • Heart attack (HCC)    • History of coronary artery disease     with remote history of bypass surgery in 2001   • Hyperlipidemia    • Hypertension     • Hypertensive encephalopathy    • Mental status change     Acute   • NO DEVICE/RECALLED    • Pleural effusion    • Pneumonia    • Poor historian    • RBBB (right bundle branch block)    • Stroke (HCC)     POOR VISION   • TIA (transient ischemic attack)    • Type 2 diabetes mellitus (HCC)    • Urinary retention    • Vitamin D deficiency      Past Surgical History:   Procedure Laterality Date   • APPENDECTOMY N/A 02/14/2016    Dr. Alexey Dodson   • ARTERIOVENOUS FISTULA/SHUNT SURGERY Right 6/3/2022    Procedure: RIGHT FOREARM ARTERIOVENOUS FISTULA PLACEMENT RADIOCEPHALIC WITH CEPHALIC VEIN TRANSPOSITION;  Surgeon: Eliseo Willis MD;  Location: [facility] MAIN OR;  Service: Vascular;  Laterality: Right;   • COLONOSCOPY      over 20 years ago.  no polyps    • COLONOSCOPY N/A 9/18/2018    Procedure: COLONOSCOPY;  Surgeon: Jose Enrique Louie MD;  Location: [facility] ENDOSCOPY;  Service: Gastroenterology   • COLONOSCOPY N/A 9/19/2018    IH, EH, polyps (TAs w/low grade dysplasia)   • COLONOSCOPY N/A 6/1/2020    Procedure: COLONOSCOPY;  Surgeon: Jose Enrique Louie MD;  Location: [facility] ENDOSCOPY;  Service: Gastroenterology;  Laterality: N/A;  Pre op-Anemia, Melena, History of Polyps  Post op-To Cecum/TI, Polyp, Poor Prep   • CORONARY ARTERY BYPASS GRAFT  11/2001   • ENDOSCOPY N/A 5/29/2020    Procedure: ESOPHAGOGASTRODUODENOSCOPY with biopsies;  Surgeon: Amanda Lovelace MD;  Location: [facility] ENDOSCOPY;  Service: Gastroenterology;  Laterality: N/A;  pre-anemia, dark stools  post-esophagitis, hiatal hernia   • ENDOSCOPY N/A 9/15/2020    Procedure: ESOPHAGOGASTRODUODENOSCOPY WITH BIOPSIES;  Surgeon: Jose Enrique Louie MD;  Location: [facility] ENDOSCOPY;  Service: Gastroenterology;  Laterality: N/A;  pre: history of melena and esophagitis  post: mild esophagitis and gastritis, small hiatal hernia   • HERNIA REPAIR Left 12/05/2019   • TONSILLECTOMY     • VASECTOMY        General Information     Row Name 03/08/23 8108           Physical Therapy Time and Intention    Document Type evaluation  -     Mode of Treatment co-treatment;physical therapy;occupational therapy  -     Row Name 03/08/23 1203          General Information    Patient Profile Reviewed yes  -     Prior Level of Function independent:;gait;transfer;bed mobility  -     Existing Precautions/Restrictions fall  -     Barriers to Rehab none identified  -     Row Name 03/08/23 1203          Living Environment    People in Home child(carlos), adult;spouse  -[facility] Name 03/08/23 1203          Cognition    Orientation Status (Cognition) oriented x 4  -     Row Name 03/08/23 1203          Safety Issues, Functional Mobility    Impairments Affecting Function (Mobility) balance;endurance/activity tolerance;strength  -           User Key  (r) = Recorded By, (t) = Taken By, (c) = Cosigned By    Initials Name Provider Type     Consuelo Andrews, PT Physical Therapist               Mobility     Row Name 03/08/23 1204          Bed Mobility    Bed Mobility supine-sit  -     Supine-Sit [place] (Bed Mobility) standby assist;verbal cues  -     Assistive Device (Bed Mobility) bed rails;head of bed elevated  -[facility] Name 03/08/23 1204          Sit-Stand Transfer    Sit-Stand [place] (Transfers) standby assist;verbal cues  -     Assistive Device (Sit-Stand Transfers) walker, front-wheeled  -[facility] Name 03/08/23 1204          Gait/Stairs (Locomotion)    [place] Level (Gait) standby assist;verbal cues  -     Assistive Device (Gait) walker, front-wheeled  -     Distance in Feet (Gait) 80ft  -     Deviations/Abnormal Patterns (Gait) eli decreased;gait speed decreased  -     Bilateral Gait Deviations forward flexed posture  -     Comment, (Gait/Stairs) Slow pace, gait distance limited by fatigue but pt reports walking only short distances at home, mostly sedentary  -           User Key  (r) = Recorded By, (t) = Taken By, (c) = Cosigned By     Initials Name Provider Type     Consuelo Andrews, PT Physical Therapist               Obj/Interventions     [facility] Name 03/08/23 1204          Range of Motion Comprehensive    General Range of Motion bilateral lower extremity ROM WFL  -[facility] Name 03/08/23 1204          Strength Comprehensive (MMT)    General Manual Muscle Testing (MMT) Assessment lower extremity strength deficits identified  -     Comment, General Manual Muscle Testing (MMT) Assessment Generalized weakness  -     Row Name 03/08/23 1204          Balance    Balance Assessment sitting static balance;sitting dynamic balance;standing static balance;standing dynamic balance  -     Static Sitting Balance standby assist  -     Dynamic Sitting Balance standby assist  -     Position, Sitting Balance unsupported;sitting edge of bed  -     Static Standing Balance standby assist;verbal cues  -     Dynamic Standing Balance standby assist;verbal cues  -     Position/Device Used, Standing Balance supported;walker, front-wheeled  -     Balance Interventions sitting;standing;sit to stand;supported;static;dynamic  -[facility] Name 03/08/23 1204          Sensory Assessment (Somatosensory)    Sensory Assessment (Somatosensory) LE sensation intact  -           User Key  (r) = Recorded By, (t) = Taken By, (c) = Cosigned By    Initials Name Provider Type     Consuelo Andrews, PT Physical Therapist               Goals/Plan     [facility] Name 03/08/23 1211          Bed Mobility Goal 1 (PT)    Activity/Assistive Device (Bed Mobility Goal 1, PT) bed mobility activities, all  -     [place] Level/Cues Needed (Bed Mobility Goal 1, PT) modified independence  [facility]     Time Frame (Bed Mobility Goal 1, PT) 1 week  -     Row Name 03/08/23 1211          Transfer Goal 1 (PT)    Activity/Assistive Device (Transfer Goal 1, PT) transfers, all  -     [place] Level/Cues Needed (Transfer Goal 1, PT) modified independence  -     Time Frame (Transfer Goal  1, PT) 1 week  -     Row Name 03/08/23 1211          Gait Training Goal 1 (PT)    Activity/Assistive Device (Gait Training Goal 1, PT) gait (walking locomotion)  -     [place] Level (Gait Training Goal 1, PT) modified independence  -     Distance (Gait Training Goal 1, PT) 150ft  -     Time Frame (Gait Training Goal 1, PT) 1 week  -     Row Name 03/08/23 1211          Therapy Assessment/Plan (PT)    Planned Therapy Interventions (PT) balance training;bed mobility training;gait training;home exercise program;patient/family education;strengthening;transfer training  -           User Key  (r) = Recorded By, (t) = Taken By, (c) = Cosigned By    Initials Name Provider Type     Consuelo Andrews, PT Physical Therapist               Clinical Impression     Row Name 03/08/23 1205          Pain    Pre/Posttreatment Pain Comment C/o chest pain with deep breaths  -     Row Name 03/08/23 1205          Plan of Care Review    Plan of Care Reviewed With patient  -     Outcome Evaluation Pt is a 68 yo M admitted from home with SOA - work-up revealed B pleural effusions and SYLVAIN. Pt s/o thoracentesis this AM - reports they removed 2L of fluid. Pt lives with his wife and 2 adult sons, reports they are unable to assist him at home but he is normally fairly independent. Only walks short distances at BL with a cane vs walker, states he is mostly sedentary at home. Pt presents to PT appearing to be close to his functional BL, but does report feeling weaker than normal. Pt transferred to EOB with SBA, STS with SBA, and ambulated 80ft with rwx and SBA. Pt overall fairly steady and indepedent with mobility, denies SOA but limited by fatigue. Pt returned to room and demo'd good standing balance at sink/commode performing hygiene tasks with OT. Pt agreeable to sitting UIC following session, left with needs meet and encouraged to call out for assist. PT will continue to follow peripherally to progress mobility as  tolerated. Pt reports he is current with OPPT 2x/week - safe to DC home and plan to continue OPPT.  -     Row Name 03/08/23 1205          Therapy Assessment/Plan (PT)    Patient/Family Therapy Goals Statement (PT) Return to PLOF  -     Rehab Potential (PT) good, to achieve stated therapy goals  -     Criteria for Skilled Interventions Met (PT) yes  -     Therapy Frequency (PT) 3 times/wk  -     Row Name 03/08/23 1205          Vital Signs    O2 Delivery Pre Treatment room air  -     O2 Delivery Intra Treatment room air  -     O2 Delivery Post Treatment room air  -     Row Name 03/08/23 1205          Positioning and Restraints    Pre-Treatment Position in bed  -     Post Treatment Position chair  -     In Chair notified nsg;reclined;call light within reach;encouraged to call for assist;exit alarm on  -           User Key  (r) = Recorded By, (t) = Taken By, (c) = Cosigned By    Initials Name Provider Type     Consueol Andrews PT Physical Therapist               Outcome Measures     Row Name 03/08/23 1212          How much help from another person do you currently need...    Turning from your back to your side while in flat bed without using bedrails? 4  -BH     Moving from lying on back to sitting on the side of a flat bed without bedrails? 3  -BH     Moving to and from a bed to a chair (including a wheelchair)? 3  -BH     Standing up from a chair using your arms (e.g., wheelchair, bedside chair)? 4  -BH     Climbing 3-5 steps with a railing? 3  -     To walk in hospital room? 3  -     AM-PAC 6 Clicks Score (PT) 20  -     Highest level of mobility 6 --> Walked 10 steps or more  -     Row Name 03/08/23 1212          Functional Assessment    Outcome Measure Options AM-PAC 6 Clicks Basic Mobility (PT)  -           User Key  (r) = Recorded By, (t) = Taken By, (c) = Cosigned By    Initials Name Provider Type    Consuelo Avalos PT Physical Therapist                             Physical  Therapy Education     Title: PT OT SLP Therapies (Done)     Topic: Physical Therapy (Done)     Point: Mobility training (Done)     Learning Progress Summary           Patient Acceptance, E,TB,D, VU,NR by  at 3/8/2023 1212                   Point: Home exercise program (Done)     Learning Progress Summary           Patient Acceptance, E,TB,D, VU,NR by  at 3/8/2023 1212                   Point: Body mechanics (Done)     Learning Progress Summary           Patient Acceptance, E,TB,D, VU,NR by  at 3/8/2023 1212                   Point: Precautions (Done)     Learning Progress Summary           Patient Acceptance, E,TB,D, VU,NR by  at 3/8/2023 1212                               User Key     Initials Effective Dates Name Provider Type Discipline     04/08/22 -  Consuelo Andrews, PT Physical Therapist PT              PT Recommendation and Plan  Planned Therapy Interventions (PT): balance training, bed mobility training, gait training, home exercise program, patient/family education, strengthening, transfer training  Plan of Care Reviewed With: patient  Outcome Evaluation: Pt is a 66 yo M admitted from home with SOA - work-up revealed B pleural effusions and SYLVAIN. Pt s/o thoracentesis this AM - reports they removed 2L of fluid. Pt lives with his wife and 2 adult sons, reports they are unable to assist him at home but he is normally fairly independent. Only walks short distances at BL with a cane vs walker, states he is mostly sedentary at home. Pt presents to PT appearing to be close to his functional BL, but does report feeling weaker than normal. Pt transferred to EOB with SBA, STS with SBA, and ambulated 80ft with rwx and SBA. Pt overall fairly steady and indepedent with mobility, denies SOA but limited by fatigue. Pt returned to room and demo'd good standing balance at sink/commode performing hygiene tasks with OT. Pt agreeable to sitting UIC following session, left with needs meet and encouraged to call out  for assist. PT will continue to follow peripherally to progress mobility as tolerated. Pt reports he is current with OPPT 2x/week - safe to DC home and plan to continue OPPT.     Time Calculation:    PT Charges     Row Name 03/08/23 1212             Time Calculation    Start Time 1053  -      Stop Time 1112  -      Time Calculation (min) 19 min  -      PT Received On 03/08/23  -      PT - Next Appointment 03/10/23  -      PT Goal Re-Cert Due Date 03/15/23  -         Time Calculation- PT    Total Timed Code Minutes- PT 15 minute(s)  -         Timed Charges    12105 - Gait Training Minutes  8  -      18701 - PT Therapeutic Activity Minutes 7  -BH         Untimed Charges    PT Eval/Re-eval Minutes 5  -         Total Minutes    Timed Charges Total Minutes 15  -      Untimed Charges Total Minutes 5  -BH       Total Minutes 20  -BH            User Key  (r) = Recorded By, (t) = Taken By, (c) = Cosigned By    Initials Name Provider Type     Consuelo Andrews, PT Physical Therapist              Therapy Charges for Today     Code Description Service Date Service Provider Modifiers Qty    89536703168 HC GAIT TRAINING EA 15 MIN 3/8/2023 Consuelo Andrews, PT GP 1    [id number] HC PT EVAL LOW COMPLEXITY 3 3/8/2023 Consuelo Andrews, PT GP 1          PT G-Codes  Outcome Measure Options: AM-PAC 6 Clicks Basic Mobility (PT)  AM-PAC 6 Clicks Score (PT): 20  PT Discharge Summary  Anticipated Discharge Disposition (PT): home with assist, home with outpatient therapy services    Consuelo Andrews PT  3/8/2023

## 2023-03-08 NOTE — PLAN OF CARE
Goal Outcome Evaluation:  Plan of Care Reviewed With: patient           Outcome Evaluation: Pt is a 68 yo M admitted from home with SOA - work-up revealed B pleural effusions and SYLVAIN. Pt s/o thoracentesis this AM - reports they removed 2L of fluid. Pt lives with his wife and 2 adult sons, reports they are unable to assist him at home but he is normally fairly independent. Only walks short distances at BL with a cane vs walker, states he is mostly sedentary at home. Pt presents to PT appearing to be close to his functional BL, but does report feeling weaker than normal. Pt transferred to EOB with SBA, STS with SBA, and ambulated 80ft with rwx and SBA. Pt overall fairly steady and indepedent with mobility, denies SOA but limited by fatigue. Pt returned to room and demo'd good standing balance at sink/commode performing hygiene tasks with OT. Pt agreeable to sitting UIC following session, left with needs meet and encouraged to call out for assist. PT will continue to follow peripherally to progress mobility as tolerated. Pt reports he is current with OPPT 2x/week - safe to DC home and plan to continue OPPT.

## 2023-03-08 NOTE — PLAN OF CARE
Goal Outcome Evaluation:   Reviewed care plan with the patient;  Pt A&O x4, admitted yesterday for pleural effusion. Pt required 2 liter of oxygen to maintain normal saturation. Renal ultrasound completed last night. Pulmonary consulted and plan for thoracentesis this morning.

## 2023-03-08 NOTE — PROGRESS NOTES
Name: Carlito Mo ADMIT: 3/6/2023   : 1955  PCP: Florencia Caballero MD    MRN: [id number] LOS: 0 days   AGE/SEX: 67 y.o. male  ROOM: [facility]     Subjective   Subjective   No new issues today.  Feels breathing is better after thoracentesis this morning.    Objective   Objective   Vital Signs  Temp:  [97.6 °F (36.4 °C)-98.1 °F (36.7 °C)] 97.7 °F (36.5 °C)  Heart Rate:  [68-76] 70  Resp:  [16-20] 18  BP: (136-158)/(67-84) 136/72  SpO2:  [96 %-100 %] 97 %  on  Flow (L/min):  [2] 2;   Device (Oxygen Therapy): room air  Body mass index is 21.84 kg/m².  Physical Exam  Constitutional:       Appearance: Normal appearance.      Comments: Chronically ill-appearing   Cardiovascular:      Rate and Rhythm: Normal rate and regular rhythm.   Musculoskeletal:      Right lower leg: No edema.      Left lower leg: No edema.   Skin:     General: Skin is warm.   Neurological:      General: No focal deficit present.      Mental Status: He is alert and oriented to person, place, and time.         Results Review     I reviewed the patient's new clinical results.  Results from last 7 days   Lab Units 23  0504 23  0545 23   WBC 10*3/mm3 6.14 5.02 4.84   HEMOGLOBIN g/dL 8.5* 7.8* 8.5*   PLATELETS 10*3/mm3 188 194 233     Results from last 7 days   Lab Units 23  0504 23  0545 23  213   SODIUM mmol/L 139 140 140   POTASSIUM mmol/L 5.2 4.6 4.3   CHLORIDE mmol/L 108* 110* 110*   CO2 mmol/L 19.5* 20.7* 21.0*   BUN mg/dL 51* 44* 41*   CREATININE mg/dL 3.65* 3.55* 3.70*   GLUCOSE mg/dL 109* 112* 208*   EGFR mL/min/1.73 17.4* 18.0* 17.2*     Results from last 7 days   Lab Units 23  0504 23  2139   ALBUMIN g/dL 3.3* 3.7   BILIRUBIN mg/dL  --  0.2   ALK PHOS U/L  --  85   AST (SGOT) U/L  --  12   ALT (SGPT) U/L  --  17     Results from last 7 days   Lab Units 23  0504 23  0545 23  2139   CALCIUM mg/dL 8.5* 7.9* 8.5*   ALBUMIN g/dL 3.3*  --  3.7   PHOSPHORUS mg/dL 4.4  --    --      Results from last 7 days   Lab Units 03/07/23  0030 03/06/23  2139   PROCALCITONIN ng/mL  --  0.10   LACTATE mmol/L 0.9  --      Hemoglobin A1C   Date/Time Value Ref Range Status   03/07/2023 0545 6.60 (H) 4.80 - 5.60 % Final     Glucose   Date/Time Value Ref Range Status   03/08/2023 1059 152 (H) 70 - 130 mg/dL Final     Comment:     Meter: [id number] : [id number] Ana Maria Madden NA   03/08/2023 0555 111 70 - 130 mg/dL Final     Comment:     Meter: [id number] : [id number] Jennifer Jaquez NA   03/07/2023 2214 147 (H) 70 - 130 mg/dL Final     Comment:     Meter: [id number] : [id number] Jennifer Jaquez NA   03/07/2023 1628 154 (H) 70 - 130 mg/dL Final     Comment:     Meter: [id number] : [id number] Ana Maria Madden NA   03/07/2023 1103 194 (H) 70 - 130 mg/dL Final     Comment:     Meter: [id number] : [id number] Matt Rivera NA   03/07/2023 0908 119 70 - 130 mg/dL Final     Comment:     Meter: [id number] : [id number] Matt Rivera NA       XR Chest 2 View    Result Date: 3/6/2023   1. Increasing consolidation at the right lung base. Some of this may be related to atelectasis. Pneumonia is not excluded. 2. Cardiomegaly with vascular congestion.  This report was finalized on 3/6/2023 10:01 PM by Dr. Alice Allen M.D.      CT Chest Without Contrast Diagnostic    Result Date: 3/7/2023   1. Large right pleural effusion and small left pleural effusion. Consolidation at the right lung base is favored to be secondary to atelectasis. However, follow-up exam is suggested. There is also some milder left basilar atelectasis.  Radiation dose reduction techniques were utilized, including automated exposure control and exposure modulation based on body size.  This report was finalized on 3/7/2023 1:04 AM by Dr. Alice Allen M.D.      US Renal Bilateral    Result Date: 3/7/2023   1. Normal sonographic appearance to the kidneys. 2. Enlarged prostate gland.  This report was finalized on 3/7/2023  10:26 PM by Dr. Alice Allen M.D.      US Thoracentesis    Result Date: 3/8/2023  Successful ultrasound guided right thoracentesis.  This report was finalized on 3/8/2023 11:09 AM by Dr. Matt Antony M.D.      Scheduled Medications  aspirin, 81 mg, Oral, Daily  atorvastatin, 80 mg, Oral, Nightly  budesonide-formoterol, 2 puff, Inhalation, BID - RT  buPROPion XL, 150 mg, Oral, Daily  carvedilol, 3.125 mg, Oral, Nightly  cholecalciferol, 5,000 Units, Oral, Daily  famotidine, 10 mg, Oral, BID  folic acid, 1,000 mcg, Oral, Daily  furosemide, 60 mg, Oral, BID  guaiFENesin, 600 mg, Oral, Q12H  insulin lispro, 0-9 Units, Subcutaneous, 4x Daily With Meals & Nightly  ipratropium-albuterol, 3 mL, Nebulization, 4x Daily - RT  levothyroxine, 75 mcg, Oral, Q AM  pantoprazole, 40 mg, Oral, Daily  sodium chloride, 10 mL, Intravenous, Q12H  tamsulosin, 0.4 mg, Oral, Nightly    Infusions  hold, 1 each  hold, 1 each    Diet  Diet: Cardiac Diets, Diabetic Diets; Healthy Heart (2-3 Na+); Consistent Carbohydrate; Texture: Regular Texture (IDDSI 7); Fluid Consistency: Thin (IDDSI 0)       Assessment/Plan     Active Hospital Problems    Diagnosis  POA   • **DE LEON (dyspnea on exertion) [R06.09]  Yes   • Shortness of breath [R06.02]  Yes   • Paroxysmal atrial fibrillation (HCC) [I48.0]  Yes   • Chronic anticoagulation [Z79.01]  Not Applicable   • CKD (chronic kidney disease) stage 4, GFR 15-29 ml/min (HCC) [N18.4]  Yes   • Anemia, chronic disease [D63.8]  Yes   • Chronically elevated troponin [R77.8]  Yes   • Coronary artery disease involving native coronary artery of native heart without angina pectoris [I25.10]  Yes   • YAW (obstructive sleep apnea) [G47.33]  Yes   • Acute on chronic combined systolic and diastolic congestive heart failure (HCC) [I50.43]  Yes   • Recurrent strokes (HCC) [I63.9]  Yes   • Acquired hypothyroidism [E03.9]  Yes   • Benign prostatic hyperplasia with nocturia [N40.1, R35.1]  Yes   • High blood pressure  associated with diabetes (HCC) [E11.59, I15.2]  Yes   • 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       67 y.o. male admitted with DE LEON (dyspnea on exertion).      03/08/23  Continue diuresis per cardiology and nephrology.    Acute on chronic combined systolic and diastolic heart failure  Pulm HTN  -TTE (3/8/2023): 41 to 45% (down from 56%); akinesis of mid to distal inferior septum, distal inferior wall, inferior apex; G3 DD; RVSP 59 mmHg  -Cardiology following-unable to have a heart cath currently given severe renal dysfunction  -s/p 2L thoracentesis 3/7/23  -Lasix 60 mg twice daily  -Cannot tolerate ACE/ARB or spironolactone secondary to kidney dysfunction    SYLVAIN on CKD4  -Crt 3.7 OA (bl ~2.8)  -CRS  -Renal following    DM2  -A1c 6.6 (3/7/23)  -SSI; monitor BG    CAD (s/p CABG)  -ASA, atorvastatin 80 mg, Coreg 3.125 mg nightly    Hypothyroid  -Synthroid 75 mcg    BPH  -Tamsulosin    COPD  -no formal PFTs, Pulm recommending OP f/u    Hx of CVA  -on Eliquis- currently on hold    · SCDs for DVT prophylaxis.  · Full code.  · Discussed with patient and care team on multidisciplinary rounds.  · Anticipate discharge home when cleared by consultants      Gregory Cross MD  [facility]  03/08/23  15:22 EST

## 2023-03-08 NOTE — PROGRESS NOTES
Jorge Shukla MD                          [phone]            Patient ID:    Name:  Carlito Mo    MRN:  [id number]    1955   67 y.o.  male            Patient Care Team:  Florencia Caballero MD as PCP - General (Internal Medicine)  Amber Murguia MD as Consulting Physician (Cardiology)  [facility] as Pharmacist  OSeth Conte MD as Surgeon (General Surgery)  Kim Hoyos MD PhD as Consulting Physician (Hematology and Oncology)  Jerome Diallo MD as Referring Physician (Family Medicine)  Jose Enrique Louie MD as Consulting Physician (Gastroenterology)  Nima Huddleston MD as Consulting Physician (Nephrology)    CC/ Reason for visit: Acute CHF, large right pleural effusion    Subjective: Pt seen and examined this AM. No acute overnight events noted. Doing better.  Much improved with thoracentesis and states that he is feeling like he is back to baseline    ROS: Denies any subjective fevers, syncope or presyncopal events, new neurological deficits, nausea or vomiting currently    Objective     Vital Signs past 24hrs    BP range: BP: (136-158)/(67-84) 136/72  Pulse range: Heart Rate:  [65-76] 68  Resp rate range: Resp:  [16-20] 16  Temp range: Temp (24hrs), Av.8 °F (36.6 °C), Min:97.6 °F (36.4 °C), Max:98.1 °F (36.7 °C)      Ventilator/Non-Invasive Ventilation Settings (From admission, onward)    None          Vent Settings                                         Device (Oxygen Therapy): room air       73 kg (161 lb); Body mass index is 21.84 kg/m².      Intake/Output Summary (Last 24 hours) at 3/8/2023 1619  Last data filed at 3/8/2023 1159  Gross per 24 hour   Intake 840 ml   Output 4000 ml   Net -3160 ml       PHYSICAL EXAM   Constitutional: Middle-aged pt in bed, no acute respiratory distress, no accessory muscle use  Head: - NCAT  Eyes: No pallor.  Anicteric sclerae, EOMI.  ENMT:  Mallampati 3, no oral thrush. Moist MM.   NECK: Trachea  midline, No thyromegaly, no palpable cervical lymphadenopathy  Heart: RRR, no murmur. No pedal edema   Lungs: HAYDEN +, decreased breath sounds in the right no wheezes/ crackles heard    Abdomen: Soft. No tenderness, guarding or rigidity. No palpable masses  Extremities: Extremities warm and well perfused. No cyanosis/ clubbing  Neuro: Conscious, answers appropriately, no gross focal neuro deficits  Psych: Mood and affect appropriate    PPE recommended per [facility] [place] infectious disease Isolation protocol for the current clinical scenario (as mentioned below) was followed.     Scheduled meds:  aspirin, 81 mg, Oral, Daily  atorvastatin, 80 mg, Oral, Nightly  budesonide-formoterol, 2 puff, Inhalation, BID - RT  buPROPion XL, 150 mg, Oral, Daily  carvedilol, 3.125 mg, Oral, Nightly  cholecalciferol, 5,000 Units, Oral, Daily  famotidine, 10 mg, Oral, BID  folic acid, 1,000 mcg, Oral, Daily  furosemide, 60 mg, Oral, BID  guaiFENesin, 600 mg, Oral, Q12H  insulin lispro, 0-9 Units, Subcutaneous, 4x Daily With Meals & Nightly  ipratropium-albuterol, 3 mL, Nebulization, 4x Daily - RT  levothyroxine, 75 mcg, Oral, Q AM  pantoprazole, 40 mg, Oral, Daily  sodium chloride, 10 mL, Intravenous, Q12H  tamsulosin, 0.4 mg, Oral, Nightly        IV meds:                      hold, 1 each  hold, 1 each        Data Review:      Results from last 7 days   Lab Units 03/08/23  0504 03/07/23  0545 03/06/23  2139   SODIUM mmol/L 139 140 140   POTASSIUM mmol/L 5.2 4.6 4.3   CHLORIDE mmol/L 108* 110* 110*   CO2 mmol/L 19.5* 20.7* 21.0*   BUN mg/dL 51* 44* 41*   CREATININE mg/dL 3.65* 3.55* 3.70*   CALCIUM mg/dL 8.5* 7.9* 8.5*   BILIRUBIN mg/dL  --   --  0.2   ALK PHOS U/L  --   --  85   ALT (SGPT) U/L  --   --  17   AST (SGOT) U/L  --   --  12   GLUCOSE mg/dL 109* 112* 208*   WBC 10*3/mm3 6.14 5.02 4.84   HEMOGLOBIN g/dL 8.5* 7.8* 8.5*   PLATELETS 10*3/mm3 188 194 233   PROBNP pg/mL  --   --  6,516.0*   PROCALCITONIN ng/mL  --   --  0.10        Lab Results   Component Value Date    CALCIUM 8.5 (L) 03/08/2023    PHOS 4.4 03/08/2023                    I have personally reviewed the results from the time of this admission to 3/8/2023 16:19 EST and agree with these findings:  [x]  Laboratory  [x]  Microbiology  [x]  Radiology  []  EKG/Telemetry   [x]  Cardiology/Vascular   []  Pathology  []  Old records    ASSESSMENT   Acute decompensated diastolic CHF  Bilateral pleural effusion s/p left-sided Thora; 2 L  SYLVAIN on CKD 4  Pulmonary hypertension-RVSP 59; suspected WHO group 2  COPD not in exacerbation  CVA on Eliquis  CAD s/p prior CABG    PLAN:  All problems are new to me.  Work-up done so far as well as recommendations from prior pulmonologist noted.  Patient in decompensated heart failure with large right pleural effusion felt to be from anasarca and has underwent thoracentesis with good response and currently on room air and does not have any shortness of breath.  Repeat chest x-ray will be ordered.  Suspect some residual effusion but would not recommend repeat Thora unless any new respiratory issues.  Okay to restart anticoagulation  Cardiology managing CHF  Nephrology on board regarding SYLVAIN on CKD and defer diuretics  Recommend to be out of bed  Guarded prognosis    Jorge Shukla MD  3/8/2023

## 2023-03-08 NOTE — PROGRESS NOTES
[facility] Progress Note      Patient Name: Carlito Mo  : 1955  MRN: [id number]  Primary Care Physician:  Florencia Caballero MD  Date of admission: 3/6/2023    Subjective     Interval History:   Follow up SYLVAIN on CKD IV>  Excellent response to IV bumex. Thoracentesis 2 L this am from right. Breathing better. Eating very well. Last bm 2 days ago, loose.     Review of Systems:   As noted above    Objective     Vitals:   Temp:  [97.5 °F (36.4 °C)-98.1 °F (36.7 °C)] 97.6 °F (36.4 °C)  Heart Rate:  [68-76] 68  Resp:  [16-20] 18  BP: (143-158)/(67-84) 145/68  Flow (L/min):  [2] 2    Intake/Output Summary (Last 24 hours) at 3/8/2023 1247  Last data filed at 3/8/2023 1159  Gross per 24 hour   Intake 1320 ml   Output 4736 ml   Net -3416 ml       Physical Exam:    General Appearance: alert, oriented x 3, no acute distress. Sitting up in chair. Very chronically ill.    Skin: warm and dry  HEENT: oral mucosa normal, nonicteric sclera  Neck: supple, no JVD  Lungs: Coarse bs, few basilar rales on Left.    Heart: RRR, no s3 or rub  Abdomen: soft, nontender, nondistended. + bs  Extremities: no edema. RUE AVF patent.   Neuro: normal speech and mental status     Scheduled Meds:     aspirin, 81 mg, Oral, Daily  atorvastatin, 80 mg, Oral, Nightly  budesonide-formoterol, 2 puff, Inhalation, BID - RT  buPROPion XL, 150 mg, Oral, Daily  carvedilol, 3.125 mg, Oral, Nightly  cholecalciferol, 5,000 Units, Oral, Daily  famotidine, 10 mg, Oral, BID  folic acid, 1,000 mcg, Oral, Daily  guaiFENesin, 600 mg, Oral, Q12H  insulin lispro, 0-9 Units, Subcutaneous, 4x Daily With Meals & Nightly  ipratropium-albuterol, 3 mL, Nebulization, 4x Daily - RT  levothyroxine, 75 mcg, Oral, Q AM  pantoprazole, 40 mg, Oral, Daily  sodium chloride, 10 mL, Intravenous, Q12H  tamsulosin, 0.4 mg, Oral, Nightly      IV Meds:   hold, 1 each  hold, 1 each        Results Reviewed:   I have personally reviewed the results from the  time of this admission to 3/8/2023 12:47 EST     Results from last 7 days   Lab Units 03/08/23  0504 03/07/23  0545 03/06/23  2139   SODIUM mmol/L 139 140 140   POTASSIUM mmol/L 5.2 4.6 4.3   CHLORIDE mmol/L 108* 110* 110*   CO2 mmol/L 19.5* 20.7* 21.0*   BUN mg/dL 51* 44* 41*   CREATININE mg/dL 3.65* 3.55* 3.70*   CALCIUM mg/dL 8.5* 7.9* 8.5*   BILIRUBIN mg/dL  --   --  0.2   ALK PHOS U/L  --   --  85   ALT (SGPT) U/L  --   --  17   AST (SGOT) U/L  --   --  12   GLUCOSE mg/dL 109* 112* 208*       Estimated Creatinine Clearance: 20.3 mL/min (A) (by C-G formula based on SCr of 3.65 mg/dL (H)).    Results from last 7 days   Lab Units 03/08/23  0504   PHOSPHORUS mg/dL 4.4       Results from last 7 days   Lab Units 03/08/23  0504   URIC ACID mg/dL 7.0       Results from last 7 days   Lab Units 03/08/23  0504 03/07/23  0545 03/06/23  2139   WBC 10*3/mm3 6.14 5.02 4.84   HEMOGLOBIN g/dL 8.5* 7.8* 8.5*   PLATELETS 10*3/mm3 188 194 233             Assessment / Plan     ASSESSMENT:  1. SYLVAIN on CKD IV. Diabetic nephropathy, nephrotic proteinuria.  Cardiorenal syndrome. Baseline creatinine variable depending on volume status. Average 3.6 here past 3 days.   2. Acute on chronic diastolic heart failure. Diuresing. Thoracentesis this am . pulm HTN .  3. DM2  4. Anemia CKD.  Iron replete.  HAI .  5. CAD SP CABG  6. Hypothyroid on replacement .    PLAN:  1. PO diuretic today.   2. Procrit.       Zahida Hsu MD  03/08/23  12:47 EST    [facility]  [phone]

## 2023-03-08 NOTE — NURSING NOTE
Pt arrived to Radiology triage [place] 12 for US right Thoracentesis.   Pt wearing a mask as well as this RN for any bedside care.

## 2023-03-09 LAB
ALBUMIN SERPL-MCNC: 3.2 G/DL (ref 3.5–5.2)
ANION GAP SERPL CALCULATED.3IONS-SCNC: 10.3 MMOL/L (ref 5–15)
BUN SERPL-MCNC: 51 MG/DL (ref 8–23)
BUN/CREAT SERPL: 12.8 (ref 7–25)
CALCIUM SPEC-SCNC: 8.5 MG/DL (ref 8.6–10.5)
CHLORIDE SERPL-SCNC: 105 MMOL/L (ref 98–107)
CO2 SERPL-SCNC: 23.7 MMOL/L (ref 22–29)
CREAT SERPL-MCNC: 4 MG/DL (ref 0.76–1.27)
CYTO UR: NORMAL
DEPRECATED RDW RBC AUTO: 43.2 FL (ref 37–54)
EGFRCR SERPLBLD CKD-EPI 2021: 15.6 ML/MIN/1.73
ERYTHROCYTE [DISTWIDTH] IN BLOOD BY AUTOMATED COUNT: 14 % (ref 12.3–15.4)
GLUCOSE BLDC GLUCOMTR-MCNC: 120 MG/DL (ref 70–130)
GLUCOSE BLDC GLUCOMTR-MCNC: 139 MG/DL (ref 70–130)
GLUCOSE BLDC GLUCOMTR-MCNC: 169 MG/DL (ref 70–130)
GLUCOSE BLDC GLUCOMTR-MCNC: 211 MG/DL (ref 70–130)
GLUCOSE SERPL-MCNC: 107 MG/DL (ref 65–99)
HCT VFR BLD AUTO: 25.7 % (ref 37.5–51)
HGB BLD-MCNC: 8.2 G/DL (ref 13–17.7)
LAB AP CASE REPORT: NORMAL
MCH RBC QN AUTO: 27.2 PG (ref 26.6–33)
MCHC RBC AUTO-ENTMCNC: 31.9 G/DL (ref 31.5–35.7)
MCV RBC AUTO: 85.1 FL (ref 79–97)
PATH REPORT.FINAL DX SPEC: NORMAL
PATH REPORT.GROSS SPEC: NORMAL
PHOSPHATE SERPL-MCNC: 4.5 MG/DL (ref 2.5–4.5)
PLATELET # BLD AUTO: 185 10*3/MM3 (ref 140–450)
PMV BLD AUTO: 10.6 FL (ref 6–12)
POTASSIUM SERPL-SCNC: 5.1 MMOL/L (ref 3.5–5.2)
RBC # BLD AUTO: 3.02 10*6/MM3 (ref 4.14–5.8)
SODIUM SERPL-SCNC: 139 MMOL/L (ref 136–145)
URATE SERPL-MCNC: 6.6 MG/DL (ref 3.4–7)
WBC NRBC COR # BLD: 4.99 10*3/MM3 (ref 3.4–10.8)

## 2023-03-09 PROCEDURE — 85027 COMPLETE CBC AUTOMATED: CPT | Performed by: INTERNAL MEDICINE

## 2023-03-09 PROCEDURE — 63710000001 INSULIN LISPRO (HUMAN) PER 5 UNITS: Performed by: NURSE PRACTITIONER

## 2023-03-09 PROCEDURE — 94799 UNLISTED PULMONARY SVC/PX: CPT

## 2023-03-09 PROCEDURE — 36415 COLL VENOUS BLD VENIPUNCTURE: CPT | Performed by: INTERNAL MEDICINE

## 2023-03-09 PROCEDURE — 82962 GLUCOSE BLOOD TEST: CPT

## 2023-03-09 PROCEDURE — 80069 RENAL FUNCTION PANEL: CPT | Performed by: INTERNAL MEDICINE

## 2023-03-09 PROCEDURE — 84550 ASSAY OF BLOOD/URIC ACID: CPT | Performed by: INTERNAL MEDICINE

## 2023-03-09 PROCEDURE — 94664 DEMO&/EVAL PT USE INHALER: CPT

## 2023-03-09 PROCEDURE — 99232 SBSQ HOSP IP/OBS MODERATE 35: CPT | Performed by: INTERNAL MEDICINE

## 2023-03-09 RX ORDER — ASPIRIN 81 MG/1
81 TABLET ORAL DAILY
Status: DISCONTINUED | OUTPATIENT
Start: 2023-03-10 | End: 2023-03-10 | Stop reason: HOSPADM

## 2023-03-09 RX ORDER — ASPIRIN 81 MG/1
81 TABLET ORAL DAILY
COMMUNITY
End: 2023-03-28 | Stop reason: SDUPTHER

## 2023-03-09 RX ORDER — INSULIN GLARGINE 100 [IU]/ML
6 INJECTION, SOLUTION SUBCUTANEOUS NIGHTLY
COMMUNITY

## 2023-03-09 RX ORDER — INSULIN GLULISINE 100 [IU]/ML
3 INJECTION, SOLUTION SUBCUTANEOUS
COMMUNITY

## 2023-03-09 RX ORDER — ROSUVASTATIN CALCIUM 40 MG/1
40 TABLET, COATED ORAL NIGHTLY
Status: DISCONTINUED | OUTPATIENT
Start: 2023-03-09 | End: 2023-03-10 | Stop reason: HOSPADM

## 2023-03-09 RX ADMIN — INSULIN LISPRO 2 UNITS: 100 INJECTION, SOLUTION INTRAVENOUS; SUBCUTANEOUS at 22:11

## 2023-03-09 RX ADMIN — Medication 10 ML: at 20:38

## 2023-03-09 RX ADMIN — INSULIN LISPRO 4 UNITS: 100 INJECTION, SOLUTION INTRAVENOUS; SUBCUTANEOUS at 13:02

## 2023-03-09 RX ADMIN — CARVEDILOL 3.12 MG: 3.12 TABLET, FILM COATED ORAL at 20:38

## 2023-03-09 RX ADMIN — APIXABAN 5 MG: 5 TABLET, FILM COATED ORAL at 13:49

## 2023-03-09 RX ADMIN — FUROSEMIDE 60 MG: 40 TABLET ORAL at 08:59

## 2023-03-09 RX ADMIN — ROSUVASTATIN CALCIUM 40 MG: 40 TABLET, FILM COATED ORAL at 20:38

## 2023-03-09 RX ADMIN — Medication 5000 UNITS: at 08:59

## 2023-03-09 RX ADMIN — PANTOPRAZOLE SODIUM 40 MG: 40 TABLET, DELAYED RELEASE ORAL at 08:59

## 2023-03-09 RX ADMIN — IPRATROPIUM BROMIDE AND ALBUTEROL SULFATE 3 ML: .5; 3 SOLUTION RESPIRATORY (INHALATION) at 10:47

## 2023-03-09 RX ADMIN — BUDESONIDE AND FORMOTEROL FUMARATE DIHYDRATE 2 PUFF: 160; 4.5 AEROSOL RESPIRATORY (INHALATION) at 07:04

## 2023-03-09 RX ADMIN — ASPIRIN 81 MG: 81 TABLET, CHEWABLE ORAL at 09:00

## 2023-03-09 RX ADMIN — GUAIFENESIN 600 MG: 600 TABLET, EXTENDED RELEASE ORAL at 20:38

## 2023-03-09 RX ADMIN — BUPROPION HYDROCHLORIDE 150 MG: 150 TABLET, EXTENDED RELEASE ORAL at 08:59

## 2023-03-09 RX ADMIN — LEVOTHYROXINE SODIUM 75 MCG: 0.07 TABLET ORAL at 06:21

## 2023-03-09 RX ADMIN — FOLIC ACID 1000 MCG: 1 TABLET ORAL at 09:00

## 2023-03-09 RX ADMIN — BUDESONIDE AND FORMOTEROL FUMARATE DIHYDRATE 2 PUFF: 160; 4.5 AEROSOL RESPIRATORY (INHALATION) at 19:46

## 2023-03-09 RX ADMIN — FAMOTIDINE 10 MG: 20 TABLET, FILM COATED ORAL at 08:59

## 2023-03-09 RX ADMIN — IPRATROPIUM BROMIDE AND ALBUTEROL SULFATE 3 ML: .5; 3 SOLUTION RESPIRATORY (INHALATION) at 14:56

## 2023-03-09 RX ADMIN — FAMOTIDINE 10 MG: 20 TABLET, FILM COATED ORAL at 20:38

## 2023-03-09 RX ADMIN — GUAIFENESIN 600 MG: 600 TABLET, EXTENDED RELEASE ORAL at 09:00

## 2023-03-09 RX ADMIN — Medication 10 ML: at 09:00

## 2023-03-09 RX ADMIN — TAMSULOSIN HYDROCHLORIDE 0.4 MG: 0.4 CAPSULE ORAL at 20:38

## 2023-03-09 NOTE — PROGRESS NOTES
LOS: 1 day   Patient Care Team:  Florencia Caballero MD as PCP - General (Internal Medicine)  Amber Murguia MD as Consulting Physician (Cardiology)  [facility] as Pharmacist  Seth Ladd MD as Surgeon (General Surgery)  Kim Hoyos MD PhD as Consulting Physician (Hematology and Oncology)  Jerome Diallo MD as Referring Physician (Family Medicine)  Jose Enrique Louie MD as Consulting Physician (Gastroenterology)  Nima Huddleston MD as Consulting Physician (Nephrology)    Chief Complaint: Follow-up for acute on chronic combined CHF, cardiomyopathy, CAD.    Interval History: Feels less short of breath.  No chest pain.  His renal function is slightly worse today.    Vital Signs:  Temp:  [97.4 °F (36.3 °C)-98 °F (36.7 °C)] 97.9 °F (36.6 °C)  Heart Rate:  [63-84] 67  Resp:  [18] 18  BP: (116-144)/(52-72) 116/52    Intake/Output Summary (Last 24 hours) at 3/9/2023 1715  Last data filed at 3/9/2023 1408  Gross per 24 hour   Intake 1490 ml   Output 2100 ml   Net -610 ml       Physical Exam:   General Appearance:    No acute distress, alert and oriented x4   Lungs:     Scattered rhonchi bilaterally    Heart:    Regular rhythm and normal rate.  No murmurs, gallops, or       rubs.   Abdomen:     Soft, nontender, nondistended.    Extremities:   No clubbing, cyanosis, or edema.     Results Review:    Results from last 7 days   Lab Units 03/09/23  0432   SODIUM mmol/L 139   POTASSIUM mmol/L 5.1   CHLORIDE mmol/L 105   CO2 mmol/L 23.7   BUN mg/dL 51*   CREATININE mg/dL 4.00*   GLUCOSE mg/dL 107*   CALCIUM mg/dL 8.5*     Results from last 7 days   Lab Units 03/07/23  1156 03/07/23  0545 03/06/23  2139   HSTROP T ng/L 93* 88* 101*     Results from last 7 days   Lab Units 03/09/23  0432   WBC 10*3/mm3 4.99   HEMOGLOBIN g/dL 8.2*   HEMATOCRIT % 25.7*   PLATELETS 10*3/mm3 185                       I reviewed the patient's new clinical results.        Assessment:  1.  Acute on chronic combined CHF  2.   Cardiomyopathy - EF 40-45%  3.  Acute kidney injury with stage IV chronic kidney disease  4.  Coronary artery disease, status post CABG in 2001  5.  Anemia of chronic disease  6.  Large right pleural effusion - status post 2 liter thoracentesis on 3/8/2023  7.  Hypertension  8.  History of embolic CVA, on Eliquis  9.  Elevated RVSP (likely pulmonary hypertension)  10.  Diabetes    Plan:  -Nephrology diuresing.  Holding further diuretics today.  No indication for dialysis at this point.    -He cannot have a heart catheterization currently because of his renal dysfunction.  This would undoubtedly put him on dialysis.  He is not having anginal symptoms.  The regional wall motion abnormality suggest potential RCA distribution.  Would continue to treat medically.    -Much better from a pulmonary standpoint since the 2 L thoracentesis yesterday.    -Continue aspirin, Lipitor, and Coreg.  He cannot be on an ACE inhibitor, ARB, or spironolactone because of his renal function.    -Continue Eliquis for history of embolic CVA.    Mauricio Corona MD  03/09/23  17:15 EST

## 2023-03-09 NOTE — PLAN OF CARE
Goal Outcome Evaluation:  Plan of Care Reviewed With: patient        Progress: improving   VSS on RA. Aox4. Thoracentesis, 2L off. ECHO done. Diuretics switched to PO. Good urine output. BM x1. Ambulated in [place] with PT with walker. Bed alarm on.

## 2023-03-09 NOTE — PROGRESS NOTES
Name: Carlito Mo ADMIT: 3/6/2023   : 1955  PCP: Florencia Caballero MD    MRN: [id number] LOS: 1 days   AGE/SEX: 67 y.o. male  ROOM: [facility]     Subjective   Subjective   No new issues this morning.  Breathing is a bit better overall.    Objective   Objective   Vital Signs  Temp:  [97.4 °F (36.3 °C)-98 °F (36.7 °C)] 97.9 °F (36.6 °C)  Heart Rate:  [63-84] 63  Resp:  [16-18] 18  BP: (116-144)/(52-72) 116/52  SpO2:  [95 %-100 %] 100 %  on   ;   Device (Oxygen Therapy): room air  Body mass index is 20.93 kg/m².  Physical Exam  Constitutional:       Appearance: Normal appearance.      Comments: Chronically ill-appearing   Cardiovascular:      Rate and Rhythm: Normal rate and regular rhythm.   Pulmonary:      Effort: Pulmonary effort is normal. No respiratory distress.      Breath sounds: No stridor. No wheezing or rhonchi.      Comments: Pursed lip breathing  Musculoskeletal:      Right lower leg: No edema.      Left lower leg: No edema.   Skin:     General: Skin is warm.   Neurological:      General: No focal deficit present.      Mental Status: He is alert and oriented to person, place, and time.         Results Review     I reviewed the patient's new clinical results.  Results from last 7 days   Lab Units 23  0432 23  0504 23  0545 23  2139   WBC 10*3/mm3 4.99 6.14 5.02 4.84   HEMOGLOBIN g/dL 8.2* 8.5* 7.8* 8.5*   PLATELETS 10*3/mm3 185 188 194 233     Results from last 7 days   Lab Units 23  0432 23  0504 23  0545 23  2139   SODIUM mmol/L 139 139 140 140   POTASSIUM mmol/L 5.1 5.2 4.6 4.3   CHLORIDE mmol/L 105 108* 110* 110*   CO2 mmol/L 23.7 19.5* 20.7* 21.0*   BUN mg/dL 51* 51* 44* 41*   CREATININE mg/dL 4.00* 3.65* 3.55* 3.70*   GLUCOSE mg/dL 107* 109* 112* 208*   EGFR mL/min/1.73 15.6* 17.4* 18.0* 17.2*     Results from last 7 days   Lab Units 23  0432 23  0504 23  2139   ALBUMIN g/dL 3.2* 3.3* 3.7   BILIRUBIN mg/dL  --   --  0.2   ALK  PHOS U/L  --   --  85   AST (SGOT) U/L  --   --  12   ALT (SGPT) U/L  --   --  17     Results from last 7 days   Lab Units 03/09/23  0432 03/08/23  0504 03/07/23  0545 03/06/23  2139   CALCIUM mg/dL 8.5* 8.5* 7.9* 8.5*   ALBUMIN g/dL 3.2* 3.3*  --  3.7   PHOSPHORUS mg/dL 4.5 4.4  --   --      Results from last 7 days   Lab Units 03/07/23  0030 03/06/23  2139   PROCALCITONIN ng/mL  --  0.10   LACTATE mmol/L 0.9  --      Hemoglobin A1C   Date/Time Value Ref Range Status   03/07/2023 0545 6.60 (H) 4.80 - 5.60 % Final     Glucose   Date/Time Value Ref Range Status   03/09/2023 1128 211 (H) 70 - 130 mg/dL Final     Comment:     Meter: [id number] : [id number] Diaz Cordero NA   03/09/2023 0542 120 70 - 130 mg/dL Final     Comment:     Meter: [id number] : [id number] Jennifer Jaquez NA   03/08/2023 2125 163 (H) 70 - 130 mg/dL Final     Comment:     Meter: [id number] : [id number] Jennifer Jaquez NA   03/08/2023 1647 156 (H) 70 - 130 mg/dL Final     Comment:     Meter: [id number] : [id number] Ana Maria Maryanne NA   03/08/2023 1059 152 (H) 70 - 130 mg/dL Final     Comment:     Meter: [id number] : [id number] Ana Maria Maryanne NA   03/08/2023 0555 111 70 - 130 mg/dL Final     Comment:     Meter: [id number] : [id number] Jennifer Jaquez NA   03/07/2023 2214 147 (H) 70 - 130 mg/dL Final     Comment:     Meter: [id number] : [id number] Jennifer Jaquez NA       US Renal Bilateral    Result Date: 3/7/2023   1. Normal sonographic appearance to the kidneys. 2. Enlarged prostate gland.  This report was finalized on 3/7/2023 10:26 PM by Dr. Alice Allen M.D.      US Thoracentesis    Result Date: 3/8/2023  Successful ultrasound guided right thoracentesis.  This report was finalized on 3/8/2023 11:09 AM by Dr. Matt Antony M.D.      Scheduled Medications  apixaban, 5 mg, Oral, Q12H  [START ON 3/10/2023] aspirin, 81 mg, Oral, Daily  budesonide-formoterol, 2 puff, Inhalation, BID - RT  buPROPion XL, 150 mg, Oral,  Daily  carvedilol, 3.125 mg, Oral, Nightly  famotidine, 10 mg, Oral, BID  [START ON 3/10/2023] furosemide, 60 mg, Oral, Daily  guaiFENesin, 600 mg, Oral, Q12H  insulin lispro, 0-9 Units, Subcutaneous, 4x Daily With Meals & Nightly  ipratropium-albuterol, 3 mL, Nebulization, 4x Daily - RT  levothyroxine, 75 mcg, Oral, Q AM  rosuvastatin, 40 mg, Oral, Nightly  sodium chloride, 10 mL, Intravenous, Q12H  tamsulosin, 0.4 mg, Oral, Nightly    Infusions  hold, 1 each  hold, 1 each    Diet  Diet: Cardiac Diets, Diabetic Diets; Healthy Heart (2-3 Na+); Consistent Carbohydrate; Texture: Regular Texture (IDDSI 7); Fluid Consistency: Thin (IDDSI 0)       Assessment/Plan     Active Hospital Problems    Diagnosis  POA   • **DE LEON (dyspnea on exertion) [R06.09]  Yes   • Shortness of breath [R06.02]  Yes   • Paroxysmal atrial fibrillation (HCC) [I48.0]  Yes   • Chronic anticoagulation [Z79.01]  Not Applicable   • CKD (chronic kidney disease) stage 4, GFR 15-29 ml/min ([facility]) [N18.4]  Yes   • Anemia, chronic disease [D63.8]  Yes   • Chronically elevated troponin [R77.8]  Yes   • Coronary artery disease involving native coronary artery of native heart without angina pectoris [I25.10]  Yes   • YAW (obstructive sleep apnea) [G47.33]  Yes   • Acute on chronic combined systolic and diastolic congestive heart failure (HCC) [I50.43]  Yes   • Recurrent strokes (HCC) [I63.9]  Yes   • Acquired hypothyroidism [E03.9]  Yes   • Benign prostatic hyperplasia with nocturia [N40.1, R35.1]  Yes   • High blood pressure associated with diabetes (HCC) [E11.59, I15.2]  Yes   • Type 2 diabetes mellitus with ophthalmic complication ([facility]) [E11.39]  Yes      Resolved Hospital Problems   No resolved problems to display.       67 y.o. male admitted with DE LEON (dyspnea on exertion).      03/09/23  Slight bump in creatinine today.  Hold diuretics.  Resume Eliquis    Acute on chronic combined systolic and diastolic heart failure  Pulm HTN  -TTE (3/8/2023): 41 to 45%  (down from 56%); akinesis of mid to distal inferior septum, distal inferior wall, inferior apex; G3 DD; RVSP 59 mmHg  -Cardiology following-unable to have a heart cath currently given severe renal dysfunction  -s/p 2L thoracentesis 3/7/23  -Lasix 60mg daily  -Cannot tolerate ACE/ARB or spironolactone secondary to kidney dysfunction    SYLVAIN on CKD4  -Crt 3.7 OA (bl ~2.8) > 4  -CRS  -Renal following    DM2  -A1c 6.6 (3/7/23)  -SSI; monitor BG    CAD (s/p CABG)  -ASA, atorvastatin 80 mg, Coreg 3.125 mg nightly    Hypothyroid  -Synthroid 75 mcg    BPH  -Tamsulosin    COPD  -no formal PFTs, Pulm recommending OP f/u    Hx of CVA  -Resume Eliquis    · Eliquis  for DVT prophylaxis.  · Full code.  · Discussed with patient and care team on multidisciplinary rounds.  · Anticipate discharge home when cleared by consultants      Gregory Cross MD  [facility]  03/09/23  13:49 EST

## 2023-03-09 NOTE — PROGRESS NOTES
[facility] Progress Note      Patient Name: Carlito Mo  : 1955  MRN: [id number]  Primary Care Physician:  Florencia Caballero MD  Date of admission: 3/6/2023    Subjective     Interval History:   Follow up SYLVAIN on CKD IV. . Excellent uop. On po diuretic. Weight down.   Eating well.  Urine very light. Bowels moving.   Review of Systems:   As noted above    Objective     Vitals:   Temp:  [97.4 °F (36.3 °C)-98 °F (36.7 °C)] 97.4 °F (36.3 °C)  Heart Rate:  [63-84] 63  Resp:  [16-18] 18  BP: (127-144)/(65-72) 144/72    Intake/Output Summary (Last 24 hours) at 3/9/2023 1224  Last data filed at 3/9/2023 0903  Gross per 24 hour   Intake 1010 ml   Output 2225 ml   Net -1215 ml       Physical Exam:    General Appearance: No acute distress. Sitting up in bed. Very chronically ill.    Skin: warm and dry  HEENT: oral mucosa normal, nonicteric sclera  Neck: supple, no JVD  Lungs: Coarse bs, few basilar rales on Left.    Heart: RRR, no s3 or rub  Abdomen: soft, nontender, nondistended. + bs  Extremities: no edema. RUE AVF patent.   Neuro: normal speech and mental status     Scheduled Meds:     aspirin, 81 mg, Oral, Daily  atorvastatin, 80 mg, Oral, Nightly  budesonide-formoterol, 2 puff, Inhalation, BID - RT  buPROPion XL, 150 mg, Oral, Daily  carvedilol, 3.125 mg, Oral, Nightly  cholecalciferol, 5,000 Units, Oral, Daily  famotidine, 10 mg, Oral, BID  folic acid, 1,000 mcg, Oral, Daily  furosemide, 60 mg, Oral, BID  guaiFENesin, 600 mg, Oral, Q12H  insulin lispro, 0-9 Units, Subcutaneous, 4x Daily With Meals & Nightly  ipratropium-albuterol, 3 mL, Nebulization, 4x Daily - RT  levothyroxine, 75 mcg, Oral, Q AM  pantoprazole, 40 mg, Oral, Daily  sodium chloride, 10 mL, Intravenous, Q12H  tamsulosin, 0.4 mg, Oral, Nightly      IV Meds:   hold, 1 each  hold, 1 each        Results Reviewed:   I have personally reviewed the results from the time of this admission to 3/9/2023 12:24 EST     Results  from last 7 days   Lab Units 03/09/23  0432 03/08/23  0504 03/07/23  0545 03/06/23  2139   SODIUM mmol/L 139 139 140 140   POTASSIUM mmol/L 5.1 5.2 4.6 4.3   CHLORIDE mmol/L 105 108* 110* 110*   CO2 mmol/L 23.7 19.5* 20.7* 21.0*   BUN mg/dL 51* 51* 44* 41*   CREATININE mg/dL 4.00* 3.65* 3.55* 3.70*   CALCIUM mg/dL 8.5* 8.5* 7.9* 8.5*   BILIRUBIN mg/dL  --   --   --  0.2   ALK PHOS U/L  --   --   --  85   ALT (SGPT) U/L  --   --   --  17   AST (SGOT) U/L  --   --   --  12   GLUCOSE mg/dL 107* 109* 112* 208*       Estimated Creatinine Clearance: 17.7 mL/min (A) (by C-G formula based on SCr of 4 mg/dL (H)).    Results from last 7 days   Lab Units 03/09/23  0432 03/08/23  0504   PHOSPHORUS mg/dL 4.5 4.4       Results from last 7 days   Lab Units 03/08/23  0504   URIC ACID mg/dL 7.0       Results from last 7 days   Lab Units 03/09/23  0432 03/08/23  0504 03/07/23  0545 03/06/23  2139   WBC 10*3/mm3 4.99 6.14 5.02 4.84   HEMOGLOBIN g/dL 8.2* 8.5* 7.8* 8.5*   PLATELETS 10*3/mm3 185 188 194 233             Assessment / Plan     ASSESSMENT:  1. SYLVAIN on CKD IV. Diabetic nephropathy, nephrotic proteinuria.  Cardiorenal syndrome. Baseline creatinine variable depending on volume status. Average 3.6 here past 3 days. UP to 4 today. Will back down oral diuretic to daily today and recheck in am. He does have an AVF right brachiocephalic that looks mature. No indication for dialysis at this point.   2. Acute on chronic diastolic heart failure, pulm HTN. Diuresing. SP thoracentesis yesterday.   3. DM2  4. Anemia CKD.  Iron replete.  HAI .  5. CAD SP CABG  6. Hypothyroid on replacement .    PLAN:  1. Hold second dose of Lasix today.  2. Recheck renal panel in am.   3. Check Uric acid.     Zahida Hsu MD  03/09/23  12:24 [facility]    [facility]  [phone]

## 2023-03-09 NOTE — PLAN OF CARE
Goal Outcome Evaluation:  Plan of Care Reviewed With: patient        Progress: improving   VSS on RA. Aox4. PO diuretics. Good urine output. Possible discharge home in AM.

## 2023-03-10 ENCOUNTER — READMISSION MANAGEMENT (OUTPATIENT)
Dept: CALL CENTER | Facility: HOSPITAL | Age: 68
End: 2023-03-10
Payer: MEDICARE

## 2023-03-10 VITALS
TEMPERATURE: 97.8 F | DIASTOLIC BLOOD PRESSURE: 64 MMHG | BODY MASS INDEX: 21.37 KG/M2 | OXYGEN SATURATION: 98 % | HEIGHT: 72 IN | WEIGHT: 157.8 LBS | HEART RATE: 70 BPM | RESPIRATION RATE: 16 BRPM | SYSTOLIC BLOOD PRESSURE: 144 MMHG

## 2023-03-10 PROBLEM — R06.09 DOE (DYSPNEA ON EXERTION): Status: RESOLVED | Noted: 2023-03-07 | Resolved: 2023-03-10

## 2023-03-10 PROBLEM — R06.02 SHORTNESS OF BREATH: Status: RESOLVED | Noted: 2023-03-08 | Resolved: 2023-03-10

## 2023-03-10 LAB
ALBUMIN SERPL-MCNC: 3.1 G/DL (ref 3.5–5.2)
ANION GAP SERPL CALCULATED.3IONS-SCNC: 11.8 MMOL/L (ref 5–15)
BUN SERPL-MCNC: 66 MG/DL (ref 8–23)
BUN/CREAT SERPL: 16.8 (ref 7–25)
CALCIUM SPEC-SCNC: 8.4 MG/DL (ref 8.6–10.5)
CHLORIDE SERPL-SCNC: 103 MMOL/L (ref 98–107)
CO2 SERPL-SCNC: 21.2 MMOL/L (ref 22–29)
CREAT SERPL-MCNC: 3.92 MG/DL (ref 0.76–1.27)
DEPRECATED RDW RBC AUTO: 42.3 FL (ref 37–54)
EGFRCR SERPLBLD CKD-EPI 2021: 16 ML/MIN/1.73
ERYTHROCYTE [DISTWIDTH] IN BLOOD BY AUTOMATED COUNT: 13.8 % (ref 12.3–15.4)
GLUCOSE BLDC GLUCOMTR-MCNC: 109 MG/DL (ref 70–130)
GLUCOSE BLDC GLUCOMTR-MCNC: 138 MG/DL (ref 70–130)
GLUCOSE SERPL-MCNC: 95 MG/DL (ref 65–99)
HCT VFR BLD AUTO: 25.4 % (ref 37.5–51)
HGB BLD-MCNC: 7.9 G/DL (ref 13–17.7)
MCH RBC QN AUTO: 26.3 PG (ref 26.6–33)
MCHC RBC AUTO-ENTMCNC: 31.1 G/DL (ref 31.5–35.7)
MCV RBC AUTO: 84.7 FL (ref 79–97)
PHOSPHATE SERPL-MCNC: 4.5 MG/DL (ref 2.5–4.5)
PLATELET # BLD AUTO: 194 10*3/MM3 (ref 140–450)
PMV BLD AUTO: 10.8 FL (ref 6–12)
POTASSIUM SERPL-SCNC: 5.3 MMOL/L (ref 3.5–5.2)
RBC # BLD AUTO: 3 10*6/MM3 (ref 4.14–5.8)
SODIUM SERPL-SCNC: 136 MMOL/L (ref 136–145)
WBC NRBC COR # BLD: 5.52 10*3/MM3 (ref 3.4–10.8)

## 2023-03-10 PROCEDURE — 94664 DEMO&/EVAL PT USE INHALER: CPT

## 2023-03-10 PROCEDURE — 36415 COLL VENOUS BLD VENIPUNCTURE: CPT | Performed by: INTERNAL MEDICINE

## 2023-03-10 PROCEDURE — 97116 GAIT TRAINING THERAPY: CPT

## 2023-03-10 PROCEDURE — 99231 SBSQ HOSP IP/OBS SF/LOW 25: CPT | Performed by: NURSE PRACTITIONER

## 2023-03-10 PROCEDURE — 94799 UNLISTED PULMONARY SVC/PX: CPT

## 2023-03-10 PROCEDURE — 94761 N-INVAS EAR/PLS OXIMETRY MLT: CPT

## 2023-03-10 PROCEDURE — 80069 RENAL FUNCTION PANEL: CPT | Performed by: INTERNAL MEDICINE

## 2023-03-10 PROCEDURE — 85027 COMPLETE CBC AUTOMATED: CPT | Performed by: INTERNAL MEDICINE

## 2023-03-10 PROCEDURE — 82962 GLUCOSE BLOOD TEST: CPT

## 2023-03-10 RX ORDER — FUROSEMIDE 20 MG/1
60 TABLET ORAL DAILY
Qty: 60 TABLET | Refills: 0 | Status: SHIPPED | OUTPATIENT
Start: 2023-03-10 | End: 2023-03-28 | Stop reason: ALTCHOICE

## 2023-03-10 RX ADMIN — APIXABAN 5 MG: 5 TABLET, FILM COATED ORAL at 09:34

## 2023-03-10 RX ADMIN — FUROSEMIDE 60 MG: 40 TABLET ORAL at 09:34

## 2023-03-10 RX ADMIN — FAMOTIDINE 10 MG: 20 TABLET, FILM COATED ORAL at 09:31

## 2023-03-10 RX ADMIN — BUPROPION HYDROCHLORIDE 150 MG: 150 TABLET, EXTENDED RELEASE ORAL at 09:34

## 2023-03-10 RX ADMIN — IPRATROPIUM BROMIDE AND ALBUTEROL SULFATE 3 ML: .5; 3 SOLUTION RESPIRATORY (INHALATION) at 11:20

## 2023-03-10 RX ADMIN — GUAIFENESIN 600 MG: 600 TABLET, EXTENDED RELEASE ORAL at 09:34

## 2023-03-10 RX ADMIN — LEVOTHYROXINE SODIUM 75 MCG: 0.07 TABLET ORAL at 07:42

## 2023-03-10 RX ADMIN — BUDESONIDE AND FORMOTEROL FUMARATE DIHYDRATE 2 PUFF: 160; 4.5 AEROSOL RESPIRATORY (INHALATION) at 08:57

## 2023-03-10 RX ADMIN — ASPIRIN 81 MG: 81 TABLET, COATED ORAL at 09:34

## 2023-03-10 NOTE — DISCHARGE SUMMARY
Patient Name: Carlito Mo  : 1955  MRN: [id number]    Date of Admission: 3/6/2023  Date of Discharge:  3/10/2023  Primary Care Physician: Florencia Caballero MD      Chief Complaint:   Shortness of Breath      Discharge Diagnoses     Active Hospital Problems    Diagnosis  POA   • **Acute on chronic combined systolic and diastolic congestive heart failure (HCC) [I50.43]  Yes   • Paroxysmal atrial fibrillation (HCC) [I48.0]  Yes   • Chronic anticoagulation [Z79.01]  Not Applicable   • CKD (chronic kidney disease) stage 4, GFR 15-29 ml/min (HCC) [N18.4]  Yes   • Anemia, chronic disease [D63.8]  Yes   • Chronically elevated troponin [R77.8]  Yes   • Coronary artery disease involving native coronary artery of native heart without angina pectoris [I25.10]  Yes   • YAW (obstructive sleep apnea) [G47.33]  Yes   • Recurrent strokes (HCC) [I63.9]  Yes   • Acquired hypothyroidism [E03.9]  Yes   • Benign prostatic hyperplasia with nocturia [N40.1, R35.1]  Yes   • High blood pressure associated with diabetes (HCC) [E11.59, I15.2]  Yes   • 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 Mo is a 67 y.o.  With a history of CKD 4, DM 2 with nephropathy, HTN, CAD, history CABG, CHF, CVA, chronic anticoagulation, BPH who presents with dyspnea on exertion and at rest.  He noticed the shortness of breath yesterday when he was out to eat for dinner.  Upon returning home he was still short of breath so called EMS.  He denies chest pain, palpitations, lightheadedness, fever, chills, cough.  He has gained almost 20 pounds since 2022. He denies lower extremity swelling and is on Lasix 40 mg twice daily. He denies LUTS, nausea vomiting but has had a few loose stools.    Hospital Course     Pt  admitted for acute on chronic combined systolic and diastolic heart failure exacerbation.  Noted to have bilateral pleural effusions and SYLVAIN on admission.  He was seen in consultation with nephrology, cardiology and pulmonology.  He underwent 2 L thoracentesis on 3/7/2023, and was also aggressively diuresed with vast improvement in his symptoms.  He was able to be weaned to room air prior to discharge.  Patient's GDMT was uptitrated as allowable, cannot tolerate ARB or spironolactone at this time secondary to kidney dysfunction.  He will follow-up with nephrology within 2 weeks with labs prior.  Of note patient was also found to have elevated troponin on admission, with resultant echo showing wall motion abnormalities of the distal and mid inferoseptum, distal inferior wall, and inferior apex consistent with a RCA distribution, however heart cath was deferred as patient would very likely end up on dialysis given kidney function.  Given his absence of anginal symptoms, cardiology recommended treating this medically at this time.  Additionally pulmonology recommends outpatient PFTs, as patient has not had any formal testing and there is concern for possible element of emphysema.  At this point patient is stable for discharge with continued home health PT and OT.  All this was discussed with the patient who expressed understanding.      Discharge Plan     Acute on chronic combined systolic and diastolic heart failure  Pulm HTN  -TTE (3/8/2023): 41 to 45% (down from 56%); akinesis of mid to distal inferior septum, distal inferior wall, inferior apex; G3 DD; RVSP 59 mmHg  -Cardiology following-unable to have a heart cath currently given severe renal dysfunction, recommend medical treatment  -s/p 2L thoracentesis 3/7/23  -cont Lasix 60mg daily    -will follow-up with nephrology for continued management  -Cannot tolerate ACE/ARB or spironolactone secondary to kidney dysfunction     SYLVAIN on CKD4, resolved  -Crt 3.7 OA (bl  ~3-3.5 depending on volume status) > 4 > 3.6  -OP Nephrology f/u as above     DM2  -A1c 6.6 (3/7/23)  -resume home regimen on dc     CAD (s/p CABG)  -ASA, atorvastatin 80 mg, Coreg 3.125 mg nightly     Hypothyroid  -Synthroid 75 mcg     BPH  -Tamsulosin     COPD?  -no formal PFTs  -Pulm recommending OP f/u for further evaluation     Hx of CVA  -Resume Eliquis    Day of Discharge     Subjective:  Feels back to baseline.  Ready for discharge.    Physical Exam:  Temp:  [97.5 °F (36.4 °C)-97.9 °F (36.6 °C)] 97.8 °F (36.6 °C)  Heart Rate:  [63-81] 78  Resp:  [16-18] 16  BP: (116-144)/(52-68) 144/64  Body mass index is 21.4 kg/m².  Physical Exam  Constitutional:       Appearance: Normal appearance.      Comments: Chronically ill-appearing   Cardiovascular:      Rate and Rhythm: Normal rate and regular rhythm.   Pulmonary:      Effort: Pulmonary effort is normal. No respiratory distress.      Breath sounds: No stridor. No wheezing or rhonchi.      Comments: Pursed lip breathing  Musculoskeletal:      Right lower leg: No edema.      Left lower leg: No edema.   Skin:     General: Skin is warm.   Neurological:      General: No focal deficit present.      Mental Status: He is alert and oriented to person, place, and time.     Consultants     Consult Orders (all) (From admission, onward)     Start     Ordered    03/07/23 1809  Inpatient Cardiology Consult  Once        Specialty:  Cardiology  Provider:  (Not yet assigned)    03/07/23 1810    03/07/23 1809  Inpatient Pulmonology Consult  Once        Specialty:  Pulmonary Disease  Provider:  (Not yet assigned)    03/07/23 1810    03/07/23 1405  Inpatient Cardiology Consult  Once,   Status:  Canceled        Specialty:  Cardiology  Provider:  Mariano Garcia MD    03/07/23 1408    03/07/23 0927  Inpatient Pulmonology Consult  Once,   Status:  Canceled        Specialty:  Pulmonary Disease  Provider:  (Not yet assigned)    03/07/23 0927    03/07/23 0408  Inpatient Cardiology Consult   Once        Specialty:  Cardiology  Provider:  Liana Garcia MD    03/07/23 0408    03/07/23 0406  Inpatient Nephrology Consult  Once        Specialty:  Nephrology  Provider:  Ilir Briones MD    03/07/23 0408    03/07/23 0321  [facility] (on-call MD unless specified) Details  Once        Specialty:  Hospitalist  Provider:  (Not yet assigned)    03/07/23 0320              Procedures     * Surgery not found *      Imaging Results (All)     Procedure Component Value Units Date/Time    XR Chest 1 View [[id number]] Collected: 03/08/23 1812     Updated: 03/08/23 1826    Narrative:      XR CHEST 1 VW-  03/08/2023     HISTORY: Follow-up pleural effusion.     Heart size is within normal limits. No pneumothorax is seen. No  significant pleural fluid is seen. There are some mild patchy areas of  atelectasis or infiltrate in the bilateral infrahilar regions.  Sternotomy wires are seen.     This report was finalized on 3/8/2023 6:23 PM by Dr. Tim Lr M.D.        Thoracentesis [[id number]] Collected: 03/08/23 1108    Specimen: Body Fluid Updated: 03/08/23 1112    Narrative:      IMAGE GUIDED THORACENTESIS     HISTORY:  diag therapeutic; R06.02-Shortness of breath; W28-Givtteq  effusion, not elsewhere classified     TECHNIQUE: Informed consent was obtained and documented in the patient's  chart. The patient was placed in upright position. After planning  ultrasound, the patient's right posterolateral thorax was prepped and  draped in the usual aseptic manner. 1% lidocaine was infiltrated at the  designated puncture site. A 5-French thoracentesis catheter was then  inserted into the pleural effusion using ultrasound guidance and a total  of 2.0 l yellow fluid was drained with specimen sent to laboratory.   Upon completion of the procedure, the catheter was withdrawn and  hemostasis achieved by manual pressure. Sterile dressing was applied.  There were no immediate complications. All elements of maximal sterile  technique  were followed.          Impression:      Successful ultrasound guided right thoracentesis.      This report was finalized on 3/8/2023 11:09 AM by Dr. Matt Antony M.D.       US Renal Bilateral [[id number]] Collected: 03/07/23 2223     Updated: 03/07/23 2229    Narrative:      RENAL ULTRASOUND     HISTORY: Acute on chronic renal failure     COMPARISON: 03/07/2023     TECHNIQUE: Grayscale and color Doppler sonographic images were obtained  through the kidneys and bladder.     FINDINGS:  Right kidney measures 10.7 x 3.9 x 4.7 cm. Left kidney measures 11.3 x  4.54 cm. No hydronephrosis is identified. Renal echotexture is normal.  Urinary bladder is distended. Prostate gland is markedly enlarged and  heterogeneous. It does protrude into the base of the bladder. Only a  right ureteral jet was identified. Patient does have a large left  pleural effusion. The patient has a known large right pleural effusion,  which is not seen on these images.       Impression:         1. Normal sonographic appearance to the kidneys.  2. Enlarged prostate gland.     This report was finalized on 3/7/2023 10:26 PM by Dr. Alice Allen M.D.       CT Chest Without Contrast Diagnostic [[id number]] Collected: 03/07/23 0058     Updated: 03/07/23 0107    Narrative:      CT CHEST WITHOUT CONTRAST     HISTORY: Shortness of breath     COMPARISON: None available.     TECHNIQUE: Axial CT imaging was obtained through the thorax. No IV  contrast was administered.     FINDINGS:  The patient has a large right pleural effusion, as well as a small left  pleural effusion. Thyroid gland, trachea, and esophagus appear  unremarkable. There are coronary artery calcifications. There is marked  the main pulmonary artery, which can be seen in setting of pulmonary  arterial hypertension. Thoracic aorta is normal in caliber. There are  shotty mediastinal lymph nodes. For example, a lower right paratracheal  node measures up to 1.2 cm in short axis dimensions.  Subcarinal node  measures up to 1.5 cm in short axis dimensions. The patient is noted to  have right basilar consolidation. This favored to be secondary to  compressive atelectasis. There is also left basilar atelectasis. There  is a hyperdense appearance to the interventricular septum, which can be  seen in the setting of anemia. Images through the upper abdomen do not  demonstrate any acute abnormalities. Patient does have some body wall  edema. No acute osseous abnormalities are seen.       Impression:         1. Large right pleural effusion and small left pleural effusion.  Consolidation at the right lung base is favored to be secondary to  atelectasis. However, follow-up exam is suggested. There is also some  milder left basilar atelectasis.     Radiation dose reduction techniques were utilized, including automated  exposure control and exposure modulation based on body size.     This report was finalized on 3/7/2023 1:04 AM by Dr. Alice Allen M.D.       XR Chest 2 View [[id number]] Collected: 03/06/23 2200     Updated: 03/06/23 2204    Narrative:      PA AND LATERAL CHEST RADIOGRAPH     HISTORY: Shortness of air     COMPARISON: 12/01/2022     FINDINGS:  Patient is status post median sternotomy with coronary artery bypass  grafting. There is increasing elevation of the right hemidiaphragm.  There is right basilar consolidation and right pleural effusion, new  when compared to prior exam. There is cardiomegaly There is vascular  congestion. Patient also appears to have a small left pleural effusion.  No pneumothorax is seen. There is some gaseous distention of bowel  within the upper abdomen. There is calcification of the aorta.       Impression:         1. Increasing consolidation at the right lung base. Some of this may be  related to atelectasis. Pneumonia is not excluded.  2. Cardiomegaly with vascular congestion.     This report was finalized on 3/6/2023 10:01 PM by Dr. Alice Allen M.D.            Results for orders placed during the hospital encounter of 09/15/22    Duplex Carotid Ultrasound CAR    Interpretation Summary  · Right internal carotid artery mild stenosis.  · Left internal carotid artery mild stenosis.    Results for orders placed during the hospital encounter of 03/06/23    Adult Transthoracic Echo Complete W/ Cont if Necessary Per Protocol    Interpretation Summary  •  There is akinesis of the mid to distal inferoseptum, distal inferior wall, and inferior apex  •  Left ventricular ejection fraction appears to be 41 - 45%.  •  Left ventricular diastolic function is consistent with (grade III w/high LAP) fixed restrictive pattern.  •  The right ventricular cavity is mildly dilated. Normal right ventricular systolic function noted.  •  The left atrial cavity is moderately dilated.  •  Mild tricuspid valve regurgitation is present.  •  Calculated right ventricular systolic pressure from tricuspid regurgitation is 59 mmHg.  •  The inferior vena cava is dilated. IVC inspiratory collapse is absent.  •  There is no evidence of pericardial effusion.    Pertinent Labs     Results from last 7 days   Lab Units 03/10/23  0500 03/09/23 0432 03/08/23  0504 03/07/23  0545   WBC 10*3/mm3 5.52 4.99 6.14 5.02   HEMOGLOBIN g/dL 7.9* 8.2* 8.5* 7.8*   PLATELETS 10*3/mm3 194 185 188 194     Results from last 7 days   Lab Units 03/10/23  0500 03/09/23  0432 03/08/23  0504 03/07/23  0545   SODIUM mmol/L 136 139 139 140   POTASSIUM mmol/L 5.3* 5.1 5.2 4.6   CHLORIDE mmol/L 103 105 108* 110*   CO2 mmol/L 21.2* 23.7 19.5* 20.7*   BUN mg/dL 66* 51* 51* 44*   CREATININE mg/dL 3.92* 4.00* 3.65* 3.55*   GLUCOSE mg/dL 95 107* 109* 112*   EGFR mL/min/1.73 16.0* 15.6* 17.4* 18.0*     Results from last 7 days   Lab Units 03/10/23  0500 03/09/23 0432 03/08/23  0504 03/06/23  2139   ALBUMIN g/dL 3.1* 3.2* 3.3* 3.7   BILIRUBIN mg/dL  --   --   --  0.2   ALK PHOS U/L  --   --   --  85   AST (SGOT) U/L  --   --   --  12   ALT  (SGPT) U/L  --   --   --  17     Results from last 7 days   Lab Units 03/10/23  0500 03/09/23  0432 03/08/23  0504 03/07/23  0545 03/06/23  2139   CALCIUM mg/dL 8.4* 8.5* 8.5* 7.9* 8.5*   ALBUMIN g/dL 3.1* 3.2* 3.3*  --  3.7   PHOSPHORUS mg/dL 4.5 4.5 4.4  --   --        Results from last 7 days   Lab Units 03/07/23  1156 03/07/23  0545 03/06/23  2139   HSTROP T ng/L 93* 88* 101*   PROBNP pg/mL  --   --  6,516.0*     Results from last 7 days   Lab Units 03/09/23  0432 03/08/23  0504 03/07/23  1152   CREATININE UR mg/dL  --   --  26.3   PROTEIN TOTAL URINE mg/dL  --   --  111.7   URIC ACID mg/dL 6.6   < >  --    PROT/CREAT RATIO UR mg/G Crea  --   --  4,247.1*    < > = values in this interval not displayed.         Invalid input(s): LDLCALC  Results from last 7 days   Lab Units 03/08/23  0829   BODYFLDCX  No growth at 2 days     Results from last 7 days   Lab Units 03/07/23  0029   COVID19  Not Detected       Test Results Pending at Discharge     Pending Labs     Order Current Status    Anaerobic Culture - Pleural Fluid, Pleural Cavity In process    AFB Culture - Body Fluid, Pleural Cavity Preliminary result    Body Fluid Culture - Body Fluid, Pleural Cavity Preliminary result          Discharge Details        Discharge Medications      Changes to Medications      Instructions Start Date   furosemide 20 MG tablet  Commonly known as: LASIX  What changed:   · medication strength  · how much to take   60 mg, Oral, Daily         Continue These Medications      Instructions Start Date   acetaminophen 325 MG tablet  Commonly known as: TYLENOL   650 mg, Oral, Every 4 Hours PRN      Apidra 100 UNIT/ML injection  Generic drug: insulin glulisine   3 Units, Subcutaneous, 3 Times Daily Before Meals      apixaban 5 MG tablet tablet  Commonly known as: ELIQUIS   5 mg, Oral, Every 12 Hours      aspirin 81 MG EC tablet   81 mg, Oral, Daily      Basaglar Tempo Pen 100 UNIT/ML solution pen-injector  Generic drug: Insulin Glargine w/  Trans Port   6 Units, Subcutaneous, Nightly      buPROPion  MG 24 hr tablet  Commonly known as: WELLBUTRIN XL   300 mg, Oral, Daily      carvedilol 3.125 MG tablet  Commonly known as: COREG   3.125 mg, Oral, 2 Times Daily With Meals      famotidine 10 MG tablet  Commonly known as: PEPCID   10 mg, Oral, 2 Times Daily      ipratropium-albuterol  MCG/ACT inhaler  Commonly known as: COMBIVENT RESPIMAT   1 puff, Inhalation, 4 Times Daily PRN      levothyroxine 75 MCG tablet  Commonly known as: SYNTHROID, LEVOTHROID   75 mcg, Oral, Daily      rosuvastatin 20 MG tablet  Commonly known as: CRESTOR   40 mg, Oral, Nightly      tamsulosin 0.4 MG capsule 24 hr capsule  Commonly known as: FLOMAX   1 capsule, Oral, Daily         Stop These Medications    amLODIPine 5 MG tablet  Commonly known as: NORVASC     hydrALAZINE 25 MG tablet  Commonly known as: APRESOLINE            Allergies   Allergen Reactions   • Prozac [Fluoxetine Hcl] Other (See Comments)     shaking       Discharge Disposition:  Home-Health Care [facility]      Discharge Diet:  Diet Order   Procedures   • Diet: Cardiac Diets, Diabetic Diets, Renal Diets; Healthy Heart (2-3 Na+); Consistent Carbohydrate; Low Potassium; Texture: Regular Texture (IDDSI 7); Fluid Consistency: Thin (IDDSI 0)       Discharge Activity:   Activity Instructions     Activity as Tolerated            CODE STATUS:    Code Status and Medical Interventions:   Ordered at: 03/07/23 0408     Code Status (Patient has no pulse and is not breathing):    CPR (Attempt to Resuscitate)     Medical Interventions (Patient has pulse or is breathing):    Full Support       Future Appointments   Date Time Provider Department Center   4/5/2023  3:00 PM Alisia Paula APRN [facility]     Additional Instructions for the Follow-ups that You Need to Schedule     Basic Metabolic Panel    Mar 15, 2023 (Approximate)         Follow-up Information     Florencia Caballero MD .    Specialty: Internal  Medicine  Contact information:  [address] [id number] [phone]             Nima Huddleston MD Follow up in 2 week(s).    Specialty: Nephrology  Contact information:  [address]  [facility] [address]  [place] [id number] [phone]             Jean-Paul García MD Follow up in 4 week(s).    Specialty: Pulmonary Disease  Why: for PFTs and eval for possible COPD  Contact information:  [address] [facility] [address]  [place] [address]  [phone]                         Additional Instructions for the Follow-ups that You Need to Schedule     Basic Metabolic Panel    Mar 15, 2023 (Approximate)        Time Spent on Discharge:  Greater than 30 minutes spent on discharge management including final examination, discussion of hospital stay and patient education, preparation of records, medication reconciliation, follow up planning      Gregory Cross MD  [facility]  03/10/23  08:58 EST

## 2023-03-10 NOTE — PLAN OF CARE
Goal Outcome Evaluation:  Patient stable for discharge.  Patient discharging home with spouse.  Wife to transport via private vehicle.

## 2023-03-10 NOTE — PROGRESS NOTES
"    Patient Name: Carlito Mo  :1955  67 y.o.      Patient Care Team:  Florencia Caballero MD as PCP - General (Internal Medicine)  Amber Murguia MD as Consulting Physician (Cardiology)  [facility] as Pharmacist  Seth Ladd MD as Surgeon (General Surgery)  Kim Hoyos MD PhD as Consulting Physician (Hematology and Oncology)  Jerome Diallo MD as Referring Physician (Family Medicine)  Jose Enrique Louie MD as Consulting Physician (Gastroenterology)  Nima Huddleston MD as Consulting Physician (Nephrology)    Chief Complaint: follow up HFrEF, cardiomyopathy, CAD    Interval History: resting in bed eating lunch. No complaints. Feels ready to go.       Objective   Vital Signs  Temp:  [97.5 °F (36.4 °C)-97.9 °F (36.6 °C)] 97.8 °F (36.6 °C)  Heart Rate:  [67-81] 70  Resp:  [16-18] 16  BP: (116-144)/(52-68) 144/64    Intake/Output Summary (Last 24 hours) at 3/10/2023 1216  Last data filed at 3/10/2023 0825  Gross per 24 hour   Intake 1610 ml   Output 1790 ml   Net -180 ml     Flowsheet Rows    Flowsheet Row First Filed Value   Admission Height 182.9 cm (72\") Documented at 2023   Admission Weight 81.6 kg (180 lb) Documented at 2023          Physical Exam:   General Appearance:    Alert, cooperative, in no acute distress   Lungs:     Clear to auscultation.  Normal respiratory effort and rate.      Heart:    Regular rhythm and normal rate, normal S1 and S2, no murmurs, gallops or rubs.     Chest Wall:    No abnormalities observed   Abdomen:     Soft, nontender, positive bowel sounds.     Extremities:   no cyanosis, clubbing or edema.  No marked joint deformities.  Adequate musculoskeletal strength.       Results Review:    Results from last 7 days   Lab Units 03/10/23  0500   SODIUM mmol/L 136   POTASSIUM mmol/L 5.3*   CHLORIDE mmol/L 103   CO2 mmol/L 21.2*   BUN mg/dL 66*   CREATININE mg/dL 3.92*   GLUCOSE mg/dL 95   CALCIUM mg/dL 8.4*     Results from last 7 " days   Lab Units 03/07/23  1156 03/07/23  0545 03/06/23  2139   HSTROP T ng/L 93* 88* 101*     Results from last 7 days   Lab Units 03/10/23  0500   WBC 10*3/mm3 5.52   HEMOGLOBIN g/dL 7.9*   HEMATOCRIT % 25.4*   PLATELETS 10*3/mm3 194                           Medication Review:   apixaban, 5 mg, Oral, Q12H  aspirin, 81 mg, Oral, Daily  budesonide-formoterol, 2 puff, Inhalation, BID - RT  buPROPion XL, 150 mg, Oral, Daily  carvedilol, 3.125 mg, Oral, Nightly  famotidine, 10 mg, Oral, BID  furosemide, 60 mg, Oral, Daily  guaiFENesin, 600 mg, Oral, Q12H  insulin lispro, 0-9 Units, Subcutaneous, 4x Daily With Meals & Nightly  ipratropium-albuterol, 3 mL, Nebulization, 4x Daily - RT  levothyroxine, 75 mcg, Oral, Q AM  rosuvastatin, 40 mg, Oral, Nightly  sodium chloride, 10 mL, Intravenous, Q12H  sodium zirconium cyclosilicate, 10 g, Oral, Once  tamsulosin, 0.4 mg, Oral, Nightly         hold, 1 each  hold, 1 each        Assessment & Plan   1. Acute on chronic combined CHF EF 40%. Not a candidate for cardiac cath due to advanced kidney disease. GDMT with carvedilol. No ACE/ARB/ARNI/SGLT2/aldactone due to #2.   2. Acute on chronic kidney disease - back on oral diuretics per nephrology.   3. Coronary artery disease status post remote CABG (2001)  4. Anemia of chronic disease  5. Large right pleural effusions status post thoracentesis 3/8/23 (2000 mL drained).  6. Hypertension  7. Pulmonary hypertension  8. Diabetes mellitus type II  9. History of embolic CVA - on apixaban and ASA.     No objection to discharge from cardiac standpoint. Will arrange follow up appt in 1-2 weeks with NP in our office.     JI Mcmullen  [facility]  03/10/23  12:16 EST.

## 2023-03-10 NOTE — PROGRESS NOTES
[facility] Progress Note      Patient Name: Carlito Mo  : 1955  MRN: [id number]  Primary Care Physician:  Florencia Caballero MD  Date of admission: 3/6/2023    Subjective     Interval History:   F/u SYLVAIN CKD4     Re view of Systems:   UOP 2L/24h  Wt up 3 lbs to 157  Denies dyspnea  PM lasix dose held yest    Objective     Vitals:   Temp:  [97.5 °F (36.4 °C)-97.9 °F (36.6 °C)] 97.8 °F (36.6 °C)  Heart Rate:  [63-81] 78  Resp:  [16-18] 16  BP: (116-144)/(52-68) 144/64    Intake/Output Summary (Last 24 hours) at 3/10/2023 0804  Last data filed at 3/10/2023 0700  Gross per 24 hour   Intake 2230 ml   Output 1965 ml   Net 265 ml       Physical Exam:    General Appearance: frail pleasant WM no distress, alert  Neck: supple, no JVD  Lungs: CTA bilat no rales  Heart: RRR, normal S1 and S2  Abdomen: soft, nontender, nondistended  Extremities: no edema, cyanosis or clubbing  RUE AVF    Scheduled Meds:     apixaban, 5 mg, Oral, Q12H  aspirin, 81 mg, Oral, Daily  budesonide-formoterol, 2 puff, Inhalation, BID - RT  buPROPion XL, 150 mg, Oral, Daily  carvedilol, 3.125 mg, Oral, Nightly  famotidine, 10 mg, Oral, BID  furosemide, 60 mg, Oral, Daily  guaiFENesin, 600 mg, Oral, Q12H  insulin lispro, 0-9 Units, Subcutaneous, 4x Daily With Meals & Nightly  ipratropium-albuterol, 3 mL, Nebulization, 4x Daily - RT  levothyroxine, 75 mcg, Oral, Q AM  rosuvastatin, 40 mg, Oral, Nightly  sodium chloride, 10 mL, Intravenous, Q12H  tamsulosin, 0.4 mg, Oral, Nightly      IV Meds:   hold, 1 each  hold, 1 each        Results Reviewed:   I have personally reviewed the results from the time of this admission to 3/10/2023 08:04 EST     Results from last 7 days   Lab Units 03/10/23  0500 23  0432 23  0504 23  0545 23  2139   SODIUM mmol/L 136 139 139   < > 140   POTASSIUM mmol/L 5.3* 5.1 5.2   < > 4.3   CHLORIDE mmol/L 103 105 108*   < > 110*   CO2 mmol/L 21.2* 23.7 19.5*   < > 21.0*    BUN mg/dL 66* 51* 51*   < > 41*   CREATININE mg/dL 3.92* 4.00* 3.65*   < > 3.70*   CALCIUM mg/dL 8.4* 8.5* 8.5*   < > 8.5*   BILIRUBIN mg/dL  --   --   --   --  0.2   ALK PHOS U/L  --   --   --   --  85   ALT (SGPT) U/L  --   --   --   --  17   AST (SGOT) U/L  --   --   --   --  12   GLUCOSE mg/dL 95 107* 109*   < > 208*    < > = values in this interval not displayed.     Estimated Creatinine Clearance: 18.5 mL/min (A) (by C-G formula based on SCr of 3.92 mg/dL (H)).  Results from last 7 days   Lab Units 03/10/23  0500 03/09/23  0432 03/08/23  0504   PHOSPHORUS mg/dL 4.5 4.5 4.4     Results from last 7 days   Lab Units 03/09/23  0432 03/08/23  0504   URIC ACID mg/dL 6.6 7.0     Results from last 7 days   Lab Units 03/10/23  0500 03/09/23  0432 03/08/23  0504 03/07/23  0545 03/06/23  2139   WBC 10*3/mm3 5.52 4.99 6.14 5.02 4.84   HEMOGLOBIN g/dL 7.9* 8.2* 8.5* 7.8* 8.5*   PLATELETS 10*3/mm3 194 185 188 194 233           Assessment / Plan     ASSESSMENT:  1. SYLVAIN on CKD IV. Diabetic nephropathy, nephrotic proteinuria.  Cardiorenal syndrome. Baseline creatinine variable depending on volume status.  Roughly mid 3s this stay, up to 4.0 yesterday with diuresis so PM lasix dose held and down to 3.6 today  K trending up 5.3.  Armen does have an AVF right brachiocephalic that appears mature. No indication for dialysis at this point.   2. Acute on chronic diastolic heart failure, pulm HTN. Diuresing. SP thoracentesis 3/8.   3. DM2  4. Anemia CKD.  Iron replete.  s/p HAI, likely will need continuation of this at infusion center.  Hgb 7.9 today  5. CAD SP CABG  6. Hypothyroid on replacement .    PLAN:  Continue lasix 60 mg PO BID  Lokelma 10gm PO x1  Restrict K in diet and change boost supplement to nepro    No objection to discharge from nephrology standpoint, will try to move up visit with Dr Bryan Huddleston (sched for 4/25) to couple weeks out, with labs prior      Jose Enrique Montelongo MD  03/10/23  08:04 EST    Nephrology  [facility]  [phone]

## 2023-03-10 NOTE — PLAN OF CARE
Goal Outcome Evaluation:  Plan of Care Reviewed With: patient        Progress: improving  Outcome Evaluation: Pt tolerated treatment well this date. Increased gait distance to 120ft w/ Rw and SBA-CGA. No unsteadiness noted, though pt slightly forward flexed when using walker. Overall, pt doing well and plans to d/c home today, OP PT services.

## 2023-03-10 NOTE — CASE MANAGEMENT/SOCIAL WORK
Case Management Discharge Note      Final Note: DC home    Provided Post Acute Provider List?: N/A  Provided Post Acute Provider Quality & Resource List?: N/A    Selected Continued Care - Discharged on 3/10/2023 Admission date: 3/6/2023 - [place] [facility] [place] Disposition Code: 01 - home or self-care

## 2023-03-10 NOTE — THERAPY TREATMENT NOTE
Patient Name: Carlito Mo  : 1955    MRN: [id number]                              Today's Date: 3/10/2023       Admit Date: 3/6/2023    Visit Dx:     ICD-10-CM ICD-9-CM   1. Shortness of breath  R06.02 786.05   2. Pleural effusion, bilateral  J90 511.9   3. Acute renal failure superimposed on chronic kidney disease, unspecified CKD stage, unspecified acute renal failure type ([facility])  N17.9 584.9    N18.9 585.9   4. Anemia, unspecified type  D64.9 285.9   5. SYLVAIN (acute kidney injury) ([facility])  N17.9 584.9     Patient Active Problem List   Diagnosis   • Major depressive disorder with single episode, in partial remission ([facility])   • Other hyperlipidemia   • Type 2 diabetes mellitus with stage 5 chronic kidney disease not on chronic dialysis, with long-term current use of insulin ([facility])   • Vitamin D deficiency   • Erectile dysfunction   • Chronic fatigue   • Type 2 diabetes mellitus with ophthalmic complication ([facility])   • Skin lesion of chest wall   • Slow transit constipation   • Benign prostatic hyperplasia with nocturia   • History of basal cell carcinoma   • High blood pressure associated with diabetes ([facility])   • Acquired hypothyroidism   • Hypertensive urgency   • Recurrent strokes ([facility])   • Noncompliance w/medication treatment due to intermit use of medication   • Urinary retention   • Pneumonia   • Acute on chronic combined systolic and diastolic congestive heart failure ([facility])   • Acute respiratory failure with hypoxia ([facility])   • Suspected sleep apnea   • Pleural effusion   • YAW (obstructive sleep apnea)   • Coronary artery disease involving native coronary artery of native heart without angina pectoris   • Chronically elevated troponin   • Colon cancer screening   • Enlarged lymph nodes   • Functional gait abnormality   • History of myocardial infarction   • Hospital discharge follow-up   • Insomnia   • Iron deficiency anemia   • Major depression, recurrent ([facility])   • Anemia, chronic renal failure, stage 3  (moderate) (HCC)   • Essential hypertension   • Adverse effect of iron and its compounds, initial encounter   • Acute on chronic renal insufficiency   • Debility   • Falls frequently   • Acute pain of right shoulder   • Acute on chronic anemia   • Heme positive stool   • Neurogenic orthostatic hypotension (HCC)   • Anemia, chronic disease   • History of colon polyps   • Helicobacter pylori gastritis   • Esophagitis   • Sleep related hypoxia   • Embolic stroke (HCC)   • Patent foramen ovale   • Pleural effusion, bilateral   • Cardiomyopathy (HCC)   • Lower abdominal pain   • Diarrhea   • CKD (chronic kidney disease) stage 4, GFR 15-29 ml/min (HCC)   • Hypertension not at goal   • Memory loss due to medical condition   • Generalized weakness   • ERRONEOUS ENCOUNTER--DISREGARD   • Weakness   • Paroxysmal atrial fibrillation (HCC)   • Chronic anticoagulation   • Multiple falls   • Dehydration   • SYLVAIN (acute kidney injury) (HCC)   • Acute ischemic stroke (HCC)     Past Medical History:   Diagnosis Date   • Acquired hypothyroidism    • Acute congestive heart failure (HCC)    • Acute respiratory failure with hypoxia (HCC)    • Acute sinusitis    • SYLVAIN (acute kidney injury) (HCC)     on CKD   • Anemia    • Arthritis    • CAD (coronary artery disease)    • Cancer (HCC)     skin   • Chronic combined systolic and diastolic congestive heart failure (HCC)    • Chronic ischemic heart disease    • Chronic kidney disease (CKD)    • CKD (chronic kidney disease)    • COPD (chronic obstructive pulmonary disease) (HCC)    • Depression    • Diabetes mellitus type 2, uncontrolled, without complications    • Diabetic neuropathy (HCC)    • Disease of thyroid gland    • Elevated cholesterol    • Fatigue    • Frequent PVCs    • Generalized weakness    • GERD (gastroesophageal reflux disease)    • Heart attack (HCC)    • History of coronary artery disease     with remote history of bypass surgery in 2001   • Hyperlipidemia    • Hypertension     • Hypertensive encephalopathy    • Mental status change     Acute   • NO DEVICE/RECALLED    • Pleural effusion    • Pneumonia    • Poor historian    • RBBB (right bundle branch block)    • Stroke (HCC)     POOR VISION   • TIA (transient ischemic attack)    • Type 2 diabetes mellitus (HCC)    • Urinary retention    • Vitamin D deficiency      Past Surgical History:   Procedure Laterality Date   • APPENDECTOMY N/A 02/14/2016    Dr. Alexey Dodson   • ARTERIOVENOUS FISTULA/SHUNT SURGERY Right 6/3/2022    Procedure: RIGHT FOREARM ARTERIOVENOUS FISTULA PLACEMENT RADIOCEPHALIC WITH CEPHALIC VEIN TRANSPOSITION;  Surgeon: Eliseo Willis MD;  Location: [facility] MAIN OR;  Service: Vascular;  Laterality: Right;   • COLONOSCOPY      over 20 years ago.  no polyps    • COLONOSCOPY N/A 9/18/2018    Procedure: COLONOSCOPY;  Surgeon: Jose Enrique Louie MD;  Location: [facility] ENDOSCOPY;  Service: Gastroenterology   • COLONOSCOPY N/A 9/19/2018    IH, EH, polyps (TAs w/low grade dysplasia)   • COLONOSCOPY N/A 6/1/2020    Procedure: COLONOSCOPY;  Surgeon: Jose Enrique Louie MD;  Location: [facility] ENDOSCOPY;  Service: Gastroenterology;  Laterality: N/A;  Pre op-Anemia, Melena, History of Polyps  Post op-To Cecum/TI, Polyp, Poor Prep   • CORONARY ARTERY BYPASS GRAFT  11/2001   • ENDOSCOPY N/A 5/29/2020    Procedure: ESOPHAGOGASTRODUODENOSCOPY with biopsies;  Surgeon: Amanda Lovelace MD;  Location: [facility] ENDOSCOPY;  Service: Gastroenterology;  Laterality: N/A;  pre-anemia, dark stools  post-esophagitis, hiatal hernia   • ENDOSCOPY N/A 9/15/2020    Procedure: ESOPHAGOGASTRODUODENOSCOPY WITH BIOPSIES;  Surgeon: Jose Enrique Louie MD;  Location: [facility] ENDOSCOPY;  Service: Gastroenterology;  Laterality: N/A;  pre: history of melena and esophagitis  post: mild esophagitis and gastritis, small hiatal hernia   • HERNIA REPAIR Left 12/05/2019   • TONSILLECTOMY     • VASECTOMY        General Information     Row Name 03/10/23 1017           Physical Therapy Time and Intention    Document Type therapy note (daily note)  -     Mode of Treatment physical therapy  -     Row Name 03/10/23 1017          General Information    Existing Precautions/Restrictions fall  -     Row Name 03/10/23 1017          Cognition    Orientation Status (Cognition) oriented x 4  -           User Key  (r) = Recorded By, (t) = Taken By, (c) = Cosigned By    Initials Name Provider Type     Amanda Burgess PTA Physical Therapist Assistant               Mobility     Row Name 03/10/23 1019          Bed Mobility    Bed Mobility supine-sit  -     Supine-Sit [place] (Bed Mobility) standby assist  -     Assistive Device (Bed Mobility) bed rails;head of bed elevated  -     Row Name 03/10/23 1019          Sit-Stand Transfer    Sit-Stand [place] (Transfers) standby assist  -     Assistive Device (Sit-Stand Transfers) walker, front-wheeled  -     Row Name 03/10/23 1019          Gait/Stairs (Locomotion)    [place] Level (Gait) standby assist  -     Assistive Device (Gait) walker, front-wheeled  -     Distance in Feet (Gait) 120  -     Deviations/Abnormal Patterns (Gait) eli decreased;stride length decreased  -SM     Bilateral Gait Deviations forward flexed posture  -           User Key  (r) = Recorded By, (t) = Taken By, (c) = Cosigned By    Initials Name Provider Type     Amanda Burgess PTA Physical Therapist Assistant               Obj/Interventions    No documentation.                Goals/Plan    No documentation.                Clinical Impression     Row Name 03/10/23 1020          Pain    Pretreatment Pain Rating 0/10 - no pain  -     Posttreatment Pain Rating 0/10 - no pain  -     Row Name 03/10/23 1020          Positioning and Restraints    Pre-Treatment Position in bed  -     Post Treatment Position bathroom  -     Bathroom sitting;call light within reach;encouraged to call for assist;notified nsg  -            User Key  (r) = Recorded By, (t) = Taken By, (c) = Cosigned By    Initials Name Provider Type     Amanda Burgess PTA Physical Therapist Assistant               Outcome Measures     Row Name 03/10/23 1021          How much help from another person do you currently need...    Turning from your back to your side while in flat bed without using bedrails? 4  -SM     Moving from lying on back to sitting on the side of a flat bed without bedrails? 3  -SM     Moving to and from a bed to a chair (including a wheelchair)? 4  -SM     Standing up from a chair using your arms (e.g., wheelchair, bedside chair)? 4  -SM     Climbing 3-5 steps with a railing? 3  -SM     To walk in hospital room? 3  -SM     AM-PAC 6 Clicks Score (PT) 21  -     Highest level of mobility 6 --> Walked 10 steps or more  -     Row Name 03/10/23 1021          Functional Assessment    Outcome Measure Options AM-PAC 6 Clicks Basic Mobility (PT)  -           User Key  (r) = Recorded By, (t) = Taken By, (c) = Cosigned By    Initials Name Provider Type    Amanda Alegria PTA Physical Therapist Assistant                             Physical Therapy Education     Title: PT OT SLP Therapies (In Progress)     Topic: Physical Therapy (Done)     Point: Mobility training (Done)     Learning Progress Summary           Patient Acceptance, E,TB,D, VU,NR by  at 3/10/2023 1021    Acceptance, E,TB,D, VU,NR by  at 3/8/2023 1212                   Point: Home exercise program (Done)     Learning Progress Summary           Patient Acceptance, E,TB,D, VU,NR by  at 3/10/2023 1021    Acceptance, E,TB,D, VU,NR by  at 3/8/2023 1212                   Point: Body mechanics (Done)     Learning Progress Summary           Patient Acceptance, E,TB,D, VU,NR by  at 3/10/2023 1021    Acceptance, E,TB,D, VU,NR by  at 3/8/2023 1212                   Point: Precautions (Done)     Learning Progress Summary           Patient Acceptance, E,TB,D, VU,NR by  at  3/10/2023 1021    Acceptance, E,TB,D, VU,NR by  at 3/8/2023 1212                               User Key     Initials Effective Dates Name Provider Type Discipline     03/07/18 -  Amanda Burgess PTA Physical Therapist Assistant PT     04/08/22 -  Consuelo Andrews PT Physical Therapist PT              PT Recommendation and Plan     Plan of Care Reviewed With: patient  Progress: improving  Outcome Evaluation: Pt tolerated treatment well this date. Increased gait distance to 120ft w/ Rw and SBA-CGA. No unsteadiness noted, though pt slightly forward flexed when using walker. Overall, pt doing well and plans to d/c home today, OP PT services.     Time Calculation:    PT Charges     Row Name 03/10/23 1023             Time Calculation    Start Time 0931  -      Stop Time 0941  -      Time Calculation (min) 10 min  -      PT Received On 03/10/23  -      PT - Next Appointment 03/13/23  -            User Key  (r) = Recorded By, (t) = Taken By, (c) = Cosigned By    Initials Name Provider Type     Amanda Burgess PTA Physical Therapist Assistant              Therapy Charges for Today     Code Description Service Date Service Provider Modifiers Qty    [id number] HC GAIT TRAINING EA 15 MIN 3/10/2023 Amanda Burgess PTA GP 1          PT G-Codes  Outcome Measure Options: AM-PAC 6 Clicks Basic Mobility (PT)  AM-PAC 6 Clicks Score (PT): 21  AM-PAC 6 Clicks Score (OT): 18  Modified [place] Scale: 4 - Moderately severe disability.  Unable to walk without assistance, and unable to attend to own bodily needs without assistance.  PT Discharge Summary  Anticipated Discharge Disposition (PT): home with assist, home with outpatient therapy services    Amanda Burgess PTA  3/10/2023

## 2023-03-11 NOTE — OUTREACH NOTE
Prep Survey    Flowsheet Row Responses   [facility] facility patient discharged from? [place]   Is LACE score < 7 ? No   Eligibility Readm Mgmt   Discharge diagnosis Acute on chronic combined systolic and diastolic congestive heart failure, bilateral pleural effusions and SYLVAIN    Does the patient have one of the following disease processes/diagnoses(primary or secondary)? CHF   Does the patient have Home health ordered? No   Is there a DME ordered? No   Prep survey completed? Yes          Jess MOORE - Registered Nurse

## 2023-03-13 LAB
BACTERIA FLD CULT: NORMAL
BACTERIA SPEC ANAEROBE CULT: NORMAL
GRAM STN SPEC: NORMAL

## 2023-03-14 ENCOUNTER — READMISSION MANAGEMENT (OUTPATIENT)
Dept: CALL CENTER | Facility: HOSPITAL | Age: 68
End: 2023-03-14
Payer: MEDICARE

## 2023-03-14 NOTE — OUTREACH NOTE
CHF Week 1 Survey    Flowsheet Row Responses   [facility] facility patient discharged from? [place]   Does the patient have one of the following disease processes/diagnoses(primary or secondary)? CHF   CHF Week 1 attempt successful? No   Unsuccessful attempts Attempt 1          DEYSI Garcia Registered Nurse

## 2023-03-21 ENCOUNTER — READMISSION MANAGEMENT (OUTPATIENT)
Dept: CALL CENTER | Facility: HOSPITAL | Age: 68
End: 2023-03-21
Payer: MEDICARE

## 2023-03-21 NOTE — OUTREACH NOTE
CHF Week 2 Survey    Flowsheet Row Responses   [facility] patient discharged from? [place]   Does the patient have one of the following disease processes/diagnoses(primary or secondary)? CHF   Week 2 attempt successful? No   Unsuccessful attempts Attempt 2          Kristina PADILLA - Registered Nurse

## 2023-03-27 ENCOUNTER — TELEPHONE (OUTPATIENT)
Dept: NEUROLOGY | Facility: CLINIC | Age: 68
End: 2023-03-27
Payer: MEDICARE

## 2023-03-28 ENCOUNTER — OFFICE VISIT (OUTPATIENT)
Dept: CARDIOLOGY | Facility: CLINIC | Age: 68
End: 2023-03-28
Payer: MEDICARE

## 2023-03-28 VITALS
SYSTOLIC BLOOD PRESSURE: 140 MMHG | DIASTOLIC BLOOD PRESSURE: 70 MMHG | HEIGHT: 72 IN | BODY MASS INDEX: 22.08 KG/M2 | WEIGHT: 163 LBS | OXYGEN SATURATION: 98 % | HEART RATE: 62 BPM

## 2023-03-28 DIAGNOSIS — I10 ESSENTIAL HYPERTENSION: Primary | ICD-10-CM

## 2023-03-28 DIAGNOSIS — J90 PLEURAL EFFUSION ON RIGHT: ICD-10-CM

## 2023-03-28 DIAGNOSIS — I50.32 CHRONIC DIASTOLIC HEART FAILURE: ICD-10-CM

## 2023-03-28 DIAGNOSIS — I25.10 CORONARY ARTERY DISEASE INVOLVING NATIVE CORONARY ARTERY OF NATIVE HEART WITHOUT ANGINA PECTORIS: ICD-10-CM

## 2023-03-28 DIAGNOSIS — N18.4 CKD (CHRONIC KIDNEY DISEASE), STAGE IV: ICD-10-CM

## 2023-03-28 PROCEDURE — 99214 OFFICE O/P EST MOD 30 MIN: CPT | Performed by: NURSE PRACTITIONER

## 2023-03-28 PROCEDURE — 1160F RVW MEDS BY RX/DR IN RCRD: CPT | Performed by: NURSE PRACTITIONER

## 2023-03-28 PROCEDURE — 1159F MED LIST DOCD IN RCRD: CPT | Performed by: NURSE PRACTITIONER

## 2023-03-28 PROCEDURE — 3078F DIAST BP <80 MM HG: CPT | Performed by: NURSE PRACTITIONER

## 2023-03-28 PROCEDURE — 3077F SYST BP >= 140 MM HG: CPT | Performed by: NURSE PRACTITIONER

## 2023-03-28 RX ORDER — FUROSEMIDE 20 MG/1
TABLET ORAL
COMMUNITY

## 2023-03-28 RX ORDER — ROSUVASTATIN CALCIUM 40 MG/1
40 TABLET, COATED ORAL DAILY
COMMUNITY

## 2023-03-28 RX ORDER — HYDRALAZINE HYDROCHLORIDE 25 MG/1
25 TABLET, FILM COATED ORAL 3 TIMES DAILY
COMMUNITY

## 2023-03-28 RX ORDER — ASPIRIN 81 MG/1
81 TABLET ORAL DAILY
Qty: 90 TABLET | Refills: 3 | Status: SHIPPED | OUTPATIENT
Start: 2023-03-28

## 2023-03-28 RX ORDER — AMLODIPINE BESYLATE 5 MG/1
5 TABLET ORAL DAILY
COMMUNITY

## 2023-03-28 NOTE — H&P (VIEW-ONLY)
Date of Office Visit: 23  Encounter Provider: JI Triplett  Place of Service: [facility] CARDIOLOGY  Patient Name: Carlito Mo  :1955    Chief Complaint   Patient presents with   • Coronary artery disease involving native coronary artery of   • Acute on chronic combined systolic and diastolic congestive   • Hyperlipidemia   • Hypertension   • Follow-up   :     HPI: Carlito Mo is a 67 y.o. male  with CAD with MI and CABG in , HTN, HLD, type 2 diabetes, anemia, chronic kidney disease , Hx CVAs in 2017, and basel cell carcinoma.  His ECHO in meber 2017 [id number] mild LV hypertrophy, mild left atrial enlargement with negative bubble, normal LV systolic fx, and EF of 68%.  NEISHA ECHO in 2017 showed a lower EF of 45% with focal wall motion abnormalities primarliy in the septum and anterior wall, small patent foramen ovale, no significant valvular disease, or cardiac emboli source minus the small PFO. 24 hour Holter monitor on 12-15-17 showed a couple short bursts of SVT, but no sustained arrhythmias.  72 hour monitor in 2018 after a    Hospital visit Showed predominant NSR, rare PACs, 9 episodes of SVT peaking at 169, rare PVCs, and no VT.  A week later another ECHO calculated EF of 62.9 and normal LV function, grade 1 dysfunction, and mild MVR.     He was hospitalized May 2020 and diagnosed with new areas of cerebral infarction felt to be embolic.  Evidence of some chronic microhemorrhages.  Repeat ECHO  showed progressive LV fx decline with current EF of 31-35%, grade 3 LV dysfunction now, moderate to severe global hypokinesis.  Mildly dilated RV cavity, moderately reduced RV fx, milld MVR, small pericardial effusion (< 1cm), and left pleural effusion.    He received IV Lasix for acute heart failure and pulmonary edema.  He was transitioned to oral diuretic and medications were adjusted for new cardiomyopathy.  He was started on  spironolactone and hydralazine was stopped.  He was switched to carvedilol and lisinopril was increased.  Nephrology evaluated and felt his baseline creatinine was 2.3-2.6.  His creatinine was 2.00 at discharge with normal potassium.  He was discharged on Eliquis in addition to aspirin.        He presented to the emergency room December 2021 with nausea and vomiting.  He admitted to not taking his diuretic.  His blood pressure was elevated 178/102.  Chest x-ray showed pulmonary vascular congestion with bilateral pleural effusions.  Nephrology and cardiology were consulted.           Patient was hospitalized 2/3/2022 for hypertensive urgency.  At that time he was also found to be in exacerbation of heart failure and diuretics were added to his regimen.  Patient was evaluated by nephrology on 2/14/2022 where he was having significant edema.  At that time he was resumed on his diuretic regimen. He had increased weakness and falls. SBP 90s. meds were adjusted.     He was hospitalized with acute on chronic combined congestive heart failure and had an ejection fraction of 40-45% in March 2023.  He was felt not to be a candidate for cardiac cath due to advanced kidney disease and was treated with goal-directed medical therapy with carvedilol.  He was not on ACE/ARB or Arni or SGLT2 agent or Aldactone due to chronic kidney disease which is managed by nephrology.  He had a large right pleural effusion which required thoracentesis on 3/8/2023 and had 2 L drained.  He was treated with apixaban and aspirin due to history of CVA which were both resumed at discharge.  He now presents for hospital discharge follow-up and saw nephrology last week and did not make any changes.  He feels as though his breathing is becoming more labored but not as bad as prior to admission.  His chest feels tight on occasion and he is having a lot of lightheadedness but that is not completely new.  He has no bleeding issues.  He is ambulating with a  "cane.  No swelling. He does not drive.             Allergies   Allergen Reactions   • Prozac [Fluoxetine Hcl] Other (See Comments)     shaking           Family and social history reviewed.     ROS  All other systems were reviewed and are negative          Objective:     Vitals:    03/28/23 1148   BP: 140/70   BP Location: Left arm   Patient Position: Sitting   Pulse: 62   SpO2: 98%   Weight: 73.9 kg (163 lb)   Height: 182.9 cm (72\")     Body mass index is 22.11 kg/m².    PHYSICAL EXAM:  Pulmonary:      Breath sounds: Examination of the right-middle field reveals decreased breath sounds. Examination of the right-lower field reveals decreased breath sounds. Decreased breath sounds present.   Cardiovascular:      Normal rate.         Procedures      Current Outpatient Medications   Medication Sig Dispense Refill   • acetaminophen (TYLENOL) 325 MG tablet Take 2 tablets by mouth Every 4 (Four) Hours As Needed for Mild Pain.     • amLODIPine (NORVASC) 5 MG tablet Take 1 tablet by mouth Daily.     • apixaban (ELIQUIS) 5 MG tablet tablet Take 1 tablet by mouth Every 12 (Twelve) Hours. Indications: Atrial Fibrillation 60 tablet    • aspirin 81 MG EC tablet Take 1 tablet by mouth Daily. 90 tablet 3   • buPROPion XL (WELLBUTRIN XL) 150 MG 24 hr tablet Take 1 tablet by mouth Daily.     • carvedilol (COREG) 3.125 MG tablet Take 1 tablet by mouth 2 (Two) Times a Day With Meals.     • furosemide (LASIX) 20 MG tablet Take one half tablet daily     • hydrALAZINE (APRESOLINE) 25 MG tablet Take 1 tablet by mouth 3 (Three) Times a Day.     • Insulin Glargine w/ Trans Port (Basaglar Tempo Pen) 100 UNIT/ML solution pen-injector Inject 6 Units under the skin into the appropriate area as directed Every Night.     • insulin glulisine (Apidra) 100 UNIT/ML injection Inject 3 Units under the skin into the appropriate area as directed 3 (Three) Times a Day Before Meals.     • ipratropium-albuterol (COMBIVENT RESPIMAT)  MCG/ACT inhaler " Inhale 1 puff 4 (Four) Times a Day As Needed for Wheezing.     • levothyroxine (SYNTHROID, LEVOTHROID) 75 MCG tablet Take 1 tablet by mouth Daily. 30 tablet 5   • rosuvastatin (CRESTOR) 40 MG tablet Take 1 tablet by mouth Daily.     • tamsulosin (FLOMAX) 0.4 MG capsule 24 hr capsule Take 1 capsule by mouth Daily.       No current facility-administered medications for this visit.     Assessment:       Diagnosis Plan   1. Essential hypertension        2. Coronary artery disease involving native coronary artery of native heart without angina pectoris        3. CKD (chronic kidney disease), stage IV ([facility])        4. Pleural effusion on right  US Thoracentesis      5. Chronic diastolic heart failure (HCC)  Ambulatory Referral to Home Health (Outpatient)           Orders Placed This Encounter   Procedures   • US Thoracentesis     Standing Status:   Future     Standing Expiration Date:   3/28/2024     Scheduling Instructions:      Instruct to hold eliquis 48 hours prior to scheduled date.     Order Specific Question:   Laterality?     Answer:   Right     Order Specific Question:   Reason for Exam:     Answer:   right pleural effusion, DE LEON     Order Specific Question:   Release to patient     Answer:   Routine Release   • Ambulatory Referral to Home Health (Outpatient)     Referral Priority:   Routine     Referral Type:   Home Health     Referral Reason:   Specialty Services Required     Requested Specialty:   Home Health Services     Number of Visits Requested:   999         Plan:     1.  67 y.o. male  with coronary artery disease, myocardial infarction and CABG in 2001. Last EF 45% 03/2023. No angina continue the same  2.  Chronic combined  congestive heart failure.   May 2021 EF 30-35%. Last EF 45% 03/2023.   Not on ACE/ARB/ ARNI/ SGLT2 or spironolactone due to renal function. Continue carvedilol 3.125 BID and furosemide 10 mg daily. - he no longer drives and does not know all of his medications today,see below   4.   Hypertension blood pressure appears stable continue coreg and hydralazine and norvasc  5. YAW -not treated.   6. Hx of CVA's on eliquis 5 mg BID and aspirin  7. HLD now on rosuvastatin  8. T2DM - last A1c 8.3  9. Anemia of chronic diseae - last hemoglobin stable  10.  Stage IV kidney disease follows with Dr. Huddleston- he has a fistula  11.Pulmonary hypertension  12. Large right pleural effusions status post thoracentesis 3/8/23 (2000 mL drained). Now with clinical evidence of re-accumulation- arrange for outpatient thoracentesis       See me in 8 weeks. Plan discussed with his wife over the phone and his medication packs were reviewed with his wife's help. We updated medication list this way after patient left the office. We will see if he qualifies for  services too.           It has been a pleasure to participate in this patient's care.      Thank you,  JI Triplett      **I used Dragon to dictate this note:**

## 2023-03-28 NOTE — PROGRESS NOTES
Date of Office Visit: 23  Encounter Provider: JI Triplett  Place of Service: [facility] CARDIOLOGY  Patient Name: Carlito Mo  :1955    Chief Complaint   Patient presents with   • Coronary artery disease involving native coronary artery of   • Acute on chronic combined systolic and diastolic congestive   • Hyperlipidemia   • Hypertension   • Follow-up   :     HPI: Carlito Mo is a 67 y.o. male  with CAD with MI and CABG in , HTN, HLD, type 2 diabetes, anemia, chronic kidney disease , Hx CVAs in 2017, and basel cell carcinoma.  His ECHO in meber 2017 [id number] mild LV hypertrophy, mild left atrial enlargement with negative bubble, normal LV systolic fx, and EF of 68%.  NEISHA ECHO in 2017 showed a lower EF of 45% with focal wall motion abnormalities primarliy in the septum and anterior wall, small patent foramen ovale, no significant valvular disease, or cardiac emboli source minus the small PFO. 24 hour Holter monitor on 12-15-17 showed a couple short bursts of SVT, but no sustained arrhythmias.  72 hour monitor in 2018 after a    Hospital visit Showed predominant NSR, rare PACs, 9 episodes of SVT peaking at 169, rare PVCs, and no VT.  A week later another ECHO calculated EF of 62.9 and normal LV function, grade 1 dysfunction, and mild MVR.     He was hospitalized May 2020 and diagnosed with new areas of cerebral infarction felt to be embolic.  Evidence of some chronic microhemorrhages.  Repeat ECHO  showed progressive LV fx decline with current EF of 31-35%, grade 3 LV dysfunction now, moderate to severe global hypokinesis.  Mildly dilated RV cavity, moderately reduced RV fx, milld MVR, small pericardial effusion (< 1cm), and left pleural effusion.    He received IV Lasix for acute heart failure and pulmonary edema.  He was transitioned to oral diuretic and medications were adjusted for new cardiomyopathy.  He was started on  spironolactone and hydralazine was stopped.  He was switched to carvedilol and lisinopril was increased.  Nephrology evaluated and felt his baseline creatinine was 2.3-2.6.  His creatinine was 2.00 at discharge with normal potassium.  He was discharged on Eliquis in addition to aspirin.        He presented to the emergency room December 2021 with nausea and vomiting.  He admitted to not taking his diuretic.  His blood pressure was elevated 178/102.  Chest x-ray showed pulmonary vascular congestion with bilateral pleural effusions.  Nephrology and cardiology were consulted.           Patient was hospitalized 2/3/2022 for hypertensive urgency.  At that time he was also found to be in exacerbation of heart failure and diuretics were added to his regimen.  Patient was evaluated by nephrology on 2/14/2022 where he was having significant edema.  At that time he was resumed on his diuretic regimen. He had increased weakness and falls. SBP 90s. meds were adjusted.     He was hospitalized with acute on chronic combined congestive heart failure and had an ejection fraction of 40-45% in March 2023.  He was felt not to be a candidate for cardiac cath due to advanced kidney disease and was treated with goal-directed medical therapy with carvedilol.  He was not on ACE/ARB or Arni or SGLT2 agent or Aldactone due to chronic kidney disease which is managed by nephrology.  He had a large right pleural effusion which required thoracentesis on 3/8/2023 and had 2 L drained.  He was treated with apixaban and aspirin due to history of CVA which were both resumed at discharge.  He now presents for hospital discharge follow-up and saw nephrology last week and did not make any changes.  He feels as though his breathing is becoming more labored but not as bad as prior to admission.  His chest feels tight on occasion and he is having a lot of lightheadedness but that is not completely new.  He has no bleeding issues.  He is ambulating with a  "cane.  No swelling. He does not drive.             Allergies   Allergen Reactions   • Prozac [Fluoxetine Hcl] Other (See Comments)     shaking           Family and social history reviewed.     ROS  All other systems were reviewed and are negative          Objective:     Vitals:    03/28/23 1148   BP: 140/70   BP Location: Left arm   Patient Position: Sitting   Pulse: 62   SpO2: 98%   Weight: 73.9 kg (163 lb)   Height: 182.9 cm (72\")     Body mass index is 22.11 kg/m².    PHYSICAL EXAM:  Pulmonary:      Breath sounds: Examination of the right-middle field reveals decreased breath sounds. Examination of the right-lower field reveals decreased breath sounds. Decreased breath sounds present.   Cardiovascular:      Normal rate.         Procedures      Current Outpatient Medications   Medication Sig Dispense Refill   • acetaminophen (TYLENOL) 325 MG tablet Take 2 tablets by mouth Every 4 (Four) Hours As Needed for Mild Pain.     • amLODIPine (NORVASC) 5 MG tablet Take 1 tablet by mouth Daily.     • apixaban (ELIQUIS) 5 MG tablet tablet Take 1 tablet by mouth Every 12 (Twelve) Hours. Indications: Atrial Fibrillation 60 tablet    • aspirin 81 MG EC tablet Take 1 tablet by mouth Daily. 90 tablet 3   • buPROPion XL (WELLBUTRIN XL) 150 MG 24 hr tablet Take 1 tablet by mouth Daily.     • carvedilol (COREG) 3.125 MG tablet Take 1 tablet by mouth 2 (Two) Times a Day With Meals.     • furosemide (LASIX) 20 MG tablet Take one half tablet daily     • hydrALAZINE (APRESOLINE) 25 MG tablet Take 1 tablet by mouth 3 (Three) Times a Day.     • Insulin Glargine w/ Trans Port (Basaglar Tempo Pen) 100 UNIT/ML solution pen-injector Inject 6 Units under the skin into the appropriate area as directed Every Night.     • insulin glulisine (Apidra) 100 UNIT/ML injection Inject 3 Units under the skin into the appropriate area as directed 3 (Three) Times a Day Before Meals.     • ipratropium-albuterol (COMBIVENT RESPIMAT)  MCG/ACT inhaler " Inhale 1 puff 4 (Four) Times a Day As Needed for Wheezing.     • levothyroxine (SYNTHROID, LEVOTHROID) 75 MCG tablet Take 1 tablet by mouth Daily. 30 tablet 5   • rosuvastatin (CRESTOR) 40 MG tablet Take 1 tablet by mouth Daily.     • tamsulosin (FLOMAX) 0.4 MG capsule 24 hr capsule Take 1 capsule by mouth Daily.       No current facility-administered medications for this visit.     Assessment:       Diagnosis Plan   1. Essential hypertension        2. Coronary artery disease involving native coronary artery of native heart without angina pectoris        3. CKD (chronic kidney disease), stage IV ([facility])        4. Pleural effusion on right  US Thoracentesis      5. Chronic diastolic heart failure (HCC)  Ambulatory Referral to Home Health (Outpatient)           Orders Placed This Encounter   Procedures   • US Thoracentesis     Standing Status:   Future     Standing Expiration Date:   3/28/2024     Scheduling Instructions:      Instruct to hold eliquis 48 hours prior to scheduled date.     Order Specific Question:   Laterality?     Answer:   Right     Order Specific Question:   Reason for Exam:     Answer:   right pleural effusion, DE LEON     Order Specific Question:   Release to patient     Answer:   Routine Release   • Ambulatory Referral to Home Health (Outpatient)     Referral Priority:   Routine     Referral Type:   Home Health     Referral Reason:   Specialty Services Required     Requested Specialty:   Home Health Services     Number of Visits Requested:   999         Plan:     1.  67 y.o. male  with coronary artery disease, myocardial infarction and CABG in 2001. Last EF 45% 03/2023. No angina continue the same  2.  Chronic combined  congestive heart failure.   May 2021 EF 30-35%. Last EF 45% 03/2023.   Not on ACE/ARB/ ARNI/ SGLT2 or spironolactone due to renal function. Continue carvedilol 3.125 BID and furosemide 10 mg daily. - he no longer drives and does not know all of his medications today,see below   4.   Hypertension blood pressure appears stable continue coreg and hydralazine and norvasc  5. YAW -not treated.   6. Hx of CVA's on eliquis 5 mg BID and aspirin  7. HLD now on rosuvastatin  8. T2DM - last A1c 8.3  9. Anemia of chronic diseae - last hemoglobin stable  10.  Stage IV kidney disease follows with Dr. Huddleston- he has a fistula  11.Pulmonary hypertension  12. Large right pleural effusions status post thoracentesis 3/8/23 (2000 mL drained). Now with clinical evidence of re-accumulation- arrange for outpatient thoracentesis       See me in 8 weeks. Plan discussed with his wife over the phone and his medication packs were reviewed with his wife's help. We updated medication list this way after patient left the office. We will see if he qualifies for  services too.           It has been a pleasure to participate in this patient's care.      Thank you,  JI Triplett      **I used Dragon to dictate this note:**

## 2023-03-29 ENCOUNTER — HOME HEALTH ADMISSION (OUTPATIENT)
Dept: HOME HEALTH SERVICES | Facility: HOME HEALTHCARE | Age: 68
End: 2023-03-29
Payer: MEDICARE

## 2023-03-30 ENCOUNTER — READMISSION MANAGEMENT (OUTPATIENT)
Dept: CALL CENTER | Facility: HOSPITAL | Age: 68
End: 2023-03-30
Payer: MEDICARE

## 2023-03-30 NOTE — OUTREACH NOTE
CHF Week 3 Survey    Flowsheet Row Responses   [facility] patient discharged from? [facility]   Does the patient have one of the following disease processes/diagnoses(primary or secondary)? CHF   Week 3 attempt successful? No   Unsuccessful attempts Attempt 1   Revoke Decline to participate  [Numerous call no answer]          Jason GONZALEZ - Registered Nurse

## 2023-04-14 ENCOUNTER — TELEPHONE (OUTPATIENT)
Dept: CARDIOLOGY | Facility: CLINIC | Age: 68
End: 2023-04-14
Payer: MEDICARE

## 2023-04-14 ENCOUNTER — HOSPITAL ENCOUNTER (OUTPATIENT)
Dept: ULTRASOUND IMAGING | Facility: HOSPITAL | Age: 68
Discharge: HOME | End: 2023-04-14
Admitting: NURSE PRACTITIONER
Payer: MEDICARE

## 2023-04-14 VITALS
RESPIRATION RATE: 20 BRPM | WEIGHT: 180 LBS | OXYGEN SATURATION: 96 % | HEIGHT: 71 IN | SYSTOLIC BLOOD PRESSURE: 181 MMHG | DIASTOLIC BLOOD PRESSURE: 87 MMHG | BODY MASS INDEX: 25.2 KG/M2 | TEMPERATURE: 98.1 F | HEART RATE: 81 BPM

## 2023-04-14 DIAGNOSIS — J90 PLEURAL EFFUSION ON RIGHT: ICD-10-CM

## 2023-04-14 DIAGNOSIS — Z98.890 S/P THORACENTESIS: ICD-10-CM

## 2023-04-14 DIAGNOSIS — J90 PLEURAL EFFUSION ON RIGHT: Primary | ICD-10-CM

## 2023-04-14 DIAGNOSIS — J90 RECURRENT RIGHT PLEURAL EFFUSION: ICD-10-CM

## 2023-04-14 LAB
ALBUMIN FLD-MCNC: 0.7 G/DL
APPEARANCE FLD: CLEAR
COLOR FLD: YELLOW
GLUCOSE FLD-MCNC: 90 MG/DL
LDH FLD-CCNC: 31 U/L
LYMPHOCYTES NFR FLD MANUAL: 46 %
METHOD: NORMAL
MONOCYTES NFR FLD: 40 %
NEUTROPHILS NFR FLD MANUAL: 14 %
NUC CELL # FLD: 176 /MM3
PROT FLD-MCNC: 1 G/DL
RBC # FLD AUTO: 82 /MM3

## 2023-04-14 PROCEDURE — 89051 BODY FLUID CELL COUNT: CPT | Performed by: INTERNAL MEDICINE

## 2023-04-14 PROCEDURE — 87205 SMEAR GRAM STAIN: CPT | Performed by: INTERNAL MEDICINE

## 2023-04-14 PROCEDURE — 76942 ECHO GUIDE FOR BIOPSY: CPT

## 2023-04-14 PROCEDURE — 82042 OTHER SOURCE ALBUMIN QUAN EA: CPT | Performed by: INTERNAL MEDICINE

## 2023-04-14 PROCEDURE — 88305 TISSUE EXAM BY PATHOLOGIST: CPT | Performed by: INTERNAL MEDICINE

## 2023-04-14 PROCEDURE — 88112 CYTOPATH CELL ENHANCE TECH: CPT | Performed by: INTERNAL MEDICINE

## 2023-04-14 PROCEDURE — 88341 IMHCHEM/IMCYTCHM EA ADD ANTB: CPT | Performed by: INTERNAL MEDICINE

## 2023-04-14 PROCEDURE — 87070 CULTURE OTHR SPECIMN AEROBIC: CPT | Performed by: INTERNAL MEDICINE

## 2023-04-14 PROCEDURE — 88342 IMHCHEM/IMCYTCHM 1ST ANTB: CPT | Performed by: INTERNAL MEDICINE

## 2023-04-14 PROCEDURE — 83615 LACTATE (LD) (LDH) ENZYME: CPT | Performed by: INTERNAL MEDICINE

## 2023-04-14 PROCEDURE — 0 LIDOCAINE 1 % SOLUTION: Performed by: RADIOLOGY

## 2023-04-14 PROCEDURE — 87075 CULTR BACTERIA EXCEPT BLOOD: CPT | Performed by: INTERNAL MEDICINE

## 2023-04-14 PROCEDURE — 84157 ASSAY OF PROTEIN OTHER: CPT | Performed by: INTERNAL MEDICINE

## 2023-04-14 PROCEDURE — 82945 GLUCOSE OTHER FLUID: CPT | Performed by: INTERNAL MEDICINE

## 2023-04-14 PROCEDURE — 87116 MYCOBACTERIA CULTURE: CPT | Performed by: INTERNAL MEDICINE

## 2023-04-14 PROCEDURE — 87206 SMEAR FLUORESCENT/ACID STAI: CPT | Performed by: INTERNAL MEDICINE

## 2023-04-14 RX ORDER — LIDOCAINE HYDROCHLORIDE 10 MG/ML
10 INJECTION, SOLUTION INFILTRATION; PERINEURAL ONCE
Status: COMPLETED | OUTPATIENT
Start: 2023-04-14 | End: 2023-04-14

## 2023-04-14 RX ORDER — SODIUM CHLORIDE 0.9 % (FLUSH) 0.9 %
10 SYRINGE (ML) INJECTION AS NEEDED
Status: DISCONTINUED | OUTPATIENT
Start: 2023-04-14 | End: 2023-04-15 | Stop reason: HOSPADM

## 2023-04-14 RX ORDER — PANTOPRAZOLE SODIUM 20 MG/1
20 TABLET, DELAYED RELEASE ORAL DAILY
COMMUNITY

## 2023-04-14 RX ADMIN — LIDOCAINE HYDROCHLORIDE 4 ML: 10 INJECTION, SOLUTION INFILTRATION; PERINEURAL at 14:07

## 2023-04-14 NOTE — DISCHARGE INSTRUCTIONS
EDUCATION /DISCHARGE INSTRUCTIONS Thoracentesis:  A thoracentesis is a procedure to remove fluid or air from the space between the lung and it's lining.  It is done to relieve lung compression and respiratory distress.  A sample can also be obtained to aid diagnosis.   Medications can be administered into the space.      During the procedure:  You will be seated comfortable leaning forward onto a pillow supported by a table.  The area will be cleaned with antiseptic soap and draped with a sterile towel.  The physician will administer a local antiseptic to numb the area.  Next, the physician will insert a needle with tubing attached into the space between the lung and lining.  Fluid is removed and a sample sent to the laboratory.   When completed a pressure dressing is applied to the site.    Risks of the procedure include but are not limited to:   *  Bleeding    *  Low blood pressure *  Infection     *  Lung collapse possibly requiring insertion of a tube to re-inflate the lung.    Benefits of the procedure:  Relief of respiratory distress and facilitation of a diagnosis.  Alternatives to the procedure:  Drug therapy with diuretics to remove fluid.  Risks of diuretic drug therapy include possible dehydration and renal failure.  The benefit of drug therapy is that it can be done at home under physician supervision.  THIS EDUCATION INFORMATION WAS REVIEWED PRIOR TO THE PROCEDURE AND CONSENT. Patient initials___________________Time__________________    Post Procedure:    *  Rest today (no pushing pulling, straining or heavy lifting).   *  Slowly increase activity tomorow.    *  If you received sedation do not drive for 24 hours.   *  Keep dressing clean and dry.   *  Leave dressing on puncture site for 24 hours.    *  You may shower when dressing removed.   *  Expect some coughing as the lung re-expands.    Call your doctor if experiencing:   *  If experiencing sudden / severe shortness of breath, chest pain or if  coughing       up blood go to the nearest emergency room.   *  Signs of infection such as redness, swelling, excessive pain and / or foul       smelling drainage from the puncture site.   *  Chills or fever over 101 degrees (by mouth).   *  Change in sputum color (yellow, green, red).   *  Unrelieved pain.   *  Any new or severe symptoms.  Following the procedure:     Follow-up with the ordering physician as directed.    Continue to take other medications as directed by your physician unless    otherwise instructed.      If you have any concerns please call the Radiology Nurses Desk at [phone].  You are the most important factor in your recovery.  Follow the above instructions carefully.

## 2023-04-14 NOTE — TELEPHONE ENCOUNTER
S/W patient's wife. She is with Carlito. Carlito says he feels tired but already feels less SOB. I will plan a follow up Chest XRay in 2 weeks. His wife expects they can have this completed 5/1/23 to reassess right pleural effusion

## 2023-04-19 LAB
BACTERIA FLD CULT: NORMAL
BACTERIA SPEC ANAEROBE CULT: NORMAL
CYTO UR: NORMAL
GRAM STN SPEC: NORMAL
GRAM STN SPEC: NORMAL
LAB AP CASE REPORT: NORMAL
LAB AP SPECIAL STAINS: NORMAL
MYCOBACTERIUM SPEC CULT: NORMAL
NIGHT BLUE STAIN TISS: NORMAL
PATH REPORT.FINAL DX SPEC: NORMAL
PATH REPORT.GROSS SPEC: NORMAL

## 2023-04-21 LAB
MYCOBACTERIUM SPEC CULT: NORMAL
NIGHT BLUE STAIN TISS: NORMAL

## 2023-04-28 LAB
MYCOBACTERIUM SPEC CULT: NORMAL
NIGHT BLUE STAIN TISS: NORMAL

## 2023-05-02 ENCOUNTER — APPOINTMENT (OUTPATIENT)
Dept: GENERAL RADIOLOGY | Facility: HOSPITAL | Age: 68
End: 2023-05-02
Payer: MEDICARE

## 2023-05-02 ENCOUNTER — HOSPITAL ENCOUNTER (OUTPATIENT)
Facility: HOSPITAL | Age: 68
Setting detail: OBSERVATION
Discharge: HOME HEALTH | End: 2023-05-05
Attending: EMERGENCY MEDICINE | Admitting: INTERNAL MEDICINE
Payer: MEDICARE

## 2023-05-02 DIAGNOSIS — N18.9 CHRONIC KIDNEY DISEASE, UNSPECIFIED CKD STAGE: ICD-10-CM

## 2023-05-02 DIAGNOSIS — R77.8 ELEVATED TROPONIN LEVEL NOT DUE MYOCARDIAL INFARCTION: ICD-10-CM

## 2023-05-02 DIAGNOSIS — R53.81 PHYSICAL DECONDITIONING: ICD-10-CM

## 2023-05-02 DIAGNOSIS — R09.02 HYPOXIA: ICD-10-CM

## 2023-05-02 DIAGNOSIS — I50.41 ACUTE COMBINED SYSTOLIC AND DIASTOLIC CONGESTIVE HEART FAILURE: Primary | ICD-10-CM

## 2023-05-02 PROBLEM — Z86.73 HISTORY OF STROKE: Status: ACTIVE | Noted: 2023-05-02

## 2023-05-02 LAB
ALBUMIN SERPL-MCNC: 4 G/DL (ref 3.5–5.2)
ALBUMIN/GLOB SERPL: 1.4 G/DL
ALP SERPL-CCNC: 108 U/L (ref 39–117)
ALT SERPL W P-5'-P-CCNC: 27 U/L (ref 1–41)
ANION GAP SERPL CALCULATED.3IONS-SCNC: 11.1 MMOL/L (ref 5–15)
APTT PPP: 28.7 SECONDS (ref 22.7–35.4)
AST SERPL-CCNC: 27 U/L (ref 1–40)
B PARAPERT DNA SPEC QL NAA+PROBE: NOT DETECTED
B PERT DNA SPEC QL NAA+PROBE: NOT DETECTED
BASOPHILS # BLD AUTO: 0.01 10*3/MM3 (ref 0–0.2)
BASOPHILS NFR BLD AUTO: 0.2 % (ref 0–1.5)
BILIRUB SERPL-MCNC: 0.3 MG/DL (ref 0–1.2)
BUN SERPL-MCNC: 69 MG/DL (ref 8–23)
BUN/CREAT SERPL: 19.4 (ref 7–25)
C PNEUM DNA NPH QL NAA+NON-PROBE: NOT DETECTED
CALCIUM SPEC-SCNC: 9.3 MG/DL (ref 8.6–10.5)
CHLORIDE SERPL-SCNC: 114 MMOL/L (ref 98–107)
CO2 SERPL-SCNC: 19.9 MMOL/L (ref 22–29)
CREAT SERPL-MCNC: 3.55 MG/DL (ref 0.76–1.27)
DEPRECATED RDW RBC AUTO: 43.4 FL (ref 37–54)
EGFRCR SERPLBLD CKD-EPI 2021: 18 ML/MIN/1.73
EOSINOPHIL # BLD AUTO: 0.21 10*3/MM3 (ref 0–0.4)
EOSINOPHIL NFR BLD AUTO: 3.3 % (ref 0.3–6.2)
ERYTHROCYTE [DISTWIDTH] IN BLOOD BY AUTOMATED COUNT: 14.1 % (ref 12.3–15.4)
FLUAV SUBTYP SPEC NAA+PROBE: NOT DETECTED
FLUBV RNA ISLT QL NAA+PROBE: NOT DETECTED
GEN 5 2HR TROPONIN T REFLEX: 96 NG/L
GLOBULIN UR ELPH-MCNC: 2.8 GM/DL
GLUCOSE BLDC GLUCOMTR-MCNC: 109 MG/DL (ref 70–130)
GLUCOSE BLDC GLUCOMTR-MCNC: 122 MG/DL (ref 70–130)
GLUCOSE BLDC GLUCOMTR-MCNC: 134 MG/DL (ref 70–130)
GLUCOSE BLDC GLUCOMTR-MCNC: 161 MG/DL (ref 70–130)
GLUCOSE SERPL-MCNC: 138 MG/DL (ref 65–99)
HADV DNA SPEC NAA+PROBE: NOT DETECTED
HCOV 229E RNA SPEC QL NAA+PROBE: NOT DETECTED
HCOV HKU1 RNA SPEC QL NAA+PROBE: NOT DETECTED
HCOV NL63 RNA SPEC QL NAA+PROBE: NOT DETECTED
HCOV OC43 RNA SPEC QL NAA+PROBE: NOT DETECTED
HCT VFR BLD AUTO: 27.8 % (ref 37.5–51)
HGB BLD-MCNC: 8.8 G/DL (ref 13–17.7)
HMPV RNA NPH QL NAA+NON-PROBE: NOT DETECTED
HPIV1 RNA ISLT QL NAA+PROBE: NOT DETECTED
HPIV2 RNA SPEC QL NAA+PROBE: NOT DETECTED
HPIV3 RNA NPH QL NAA+PROBE: NOT DETECTED
HPIV4 P GENE NPH QL NAA+PROBE: NOT DETECTED
IMM GRANULOCYTES # BLD AUTO: 0.03 10*3/MM3 (ref 0–0.05)
IMM GRANULOCYTES NFR BLD AUTO: 0.5 % (ref 0–0.5)
INR PPP: 1.23 (ref 0.9–1.1)
LYMPHOCYTES # BLD AUTO: 0.75 10*3/MM3 (ref 0.7–3.1)
LYMPHOCYTES NFR BLD AUTO: 11.9 % (ref 19.6–45.3)
M PNEUMO IGG SER IA-ACNC: NOT DETECTED
MCH RBC QN AUTO: 27.1 PG (ref 26.6–33)
MCHC RBC AUTO-ENTMCNC: 31.7 G/DL (ref 31.5–35.7)
MCV RBC AUTO: 85.5 FL (ref 79–97)
MONOCYTES # BLD AUTO: 0.54 10*3/MM3 (ref 0.1–0.9)
MONOCYTES NFR BLD AUTO: 8.6 % (ref 5–12)
NEUTROPHILS NFR BLD AUTO: 4.74 10*3/MM3 (ref 1.7–7)
NEUTROPHILS NFR BLD AUTO: 75.5 % (ref 42.7–76)
NRBC BLD AUTO-RTO: 0 /100 WBC (ref 0–0.2)
NT-PROBNP SERPL-MCNC: 8501 PG/ML (ref 0–900)
PLATELET # BLD AUTO: 248 10*3/MM3 (ref 140–450)
PMV BLD AUTO: 10.1 FL (ref 6–12)
POTASSIUM SERPL-SCNC: 5.3 MMOL/L (ref 3.5–5.2)
PROCALCITONIN SERPL-MCNC: 0.1 NG/ML (ref 0–0.25)
PROT SERPL-MCNC: 6.8 G/DL (ref 6–8.5)
PROTHROMBIN TIME: 15.6 SECONDS (ref 11.7–14.2)
QT INTERVAL: 430 MS
RBC # BLD AUTO: 3.25 10*6/MM3 (ref 4.14–5.8)
RHINOVIRUS RNA SPEC NAA+PROBE: NOT DETECTED
RSV RNA NPH QL NAA+NON-PROBE: NOT DETECTED
SARS-COV-2 RNA NPH QL NAA+NON-PROBE: NOT DETECTED
SODIUM SERPL-SCNC: 145 MMOL/L (ref 136–145)
TROPONIN T DELTA: -2 NG/L
TROPONIN T SERPL HS-MCNC: 98 NG/L
WBC NRBC COR # BLD: 6.28 10*3/MM3 (ref 3.4–10.8)

## 2023-05-02 PROCEDURE — 94640 AIRWAY INHALATION TREATMENT: CPT

## 2023-05-02 PROCEDURE — 84484 ASSAY OF TROPONIN QUANT: CPT | Performed by: PHYSICIAN ASSISTANT

## 2023-05-02 PROCEDURE — 85610 PROTHROMBIN TIME: CPT | Performed by: PHYSICIAN ASSISTANT

## 2023-05-02 PROCEDURE — 25010000002 FUROSEMIDE PER 20 MG: Performed by: EMERGENCY MEDICINE

## 2023-05-02 PROCEDURE — 96374 THER/PROPH/DIAG INJ IV PUSH: CPT

## 2023-05-02 PROCEDURE — 36415 COLL VENOUS BLD VENIPUNCTURE: CPT

## 2023-05-02 PROCEDURE — 0202U NFCT DS 22 TRGT SARS-COV-2: CPT | Performed by: PHYSICIAN ASSISTANT

## 2023-05-02 PROCEDURE — 71045 X-RAY EXAM CHEST 1 VIEW: CPT

## 2023-05-02 PROCEDURE — 80053 COMPREHEN METABOLIC PANEL: CPT | Performed by: PHYSICIAN ASSISTANT

## 2023-05-02 PROCEDURE — 93010 ELECTROCARDIOGRAM REPORT: CPT | Performed by: INTERNAL MEDICINE

## 2023-05-02 PROCEDURE — 93005 ELECTROCARDIOGRAM TRACING: CPT

## 2023-05-02 PROCEDURE — 85730 THROMBOPLASTIN TIME PARTIAL: CPT | Performed by: PHYSICIAN ASSISTANT

## 2023-05-02 PROCEDURE — 25010000002 FUROSEMIDE PER 20 MG: Performed by: INTERNAL MEDICINE

## 2023-05-02 PROCEDURE — 82948 REAGENT STRIP/BLOOD GLUCOSE: CPT

## 2023-05-02 PROCEDURE — 93005 ELECTROCARDIOGRAM TRACING: CPT | Performed by: EMERGENCY MEDICINE

## 2023-05-02 PROCEDURE — 63710000001 INSULIN LISPRO (HUMAN) PER 5 UNITS: Performed by: INTERNAL MEDICINE

## 2023-05-02 PROCEDURE — 85025 COMPLETE CBC W/AUTO DIFF WBC: CPT | Performed by: PHYSICIAN ASSISTANT

## 2023-05-02 PROCEDURE — 99285 EMERGENCY DEPT VISIT HI MDM: CPT

## 2023-05-02 PROCEDURE — 84145 PROCALCITONIN (PCT): CPT | Performed by: PHYSICIAN ASSISTANT

## 2023-05-02 PROCEDURE — G0378 HOSPITAL OBSERVATION PER HR: HCPCS

## 2023-05-02 PROCEDURE — 83880 ASSAY OF NATRIURETIC PEPTIDE: CPT | Performed by: PHYSICIAN ASSISTANT

## 2023-05-02 PROCEDURE — 96376 TX/PRO/DX INJ SAME DRUG ADON: CPT

## 2023-05-02 PROCEDURE — 94799 UNLISTED PULMONARY SVC/PX: CPT

## 2023-05-02 RX ORDER — INSULIN LISPRO 100 [IU]/ML
2-9 INJECTION, SOLUTION INTRAVENOUS; SUBCUTANEOUS
Status: DISCONTINUED | OUTPATIENT
Start: 2023-05-02 | End: 2023-05-02

## 2023-05-02 RX ORDER — ACETAMINOPHEN 650 MG/1
650 SUPPOSITORY RECTAL EVERY 4 HOURS PRN
Status: DISCONTINUED | OUTPATIENT
Start: 2023-05-02 | End: 2023-05-05 | Stop reason: HOSPADM

## 2023-05-02 RX ORDER — FUROSEMIDE 10 MG/ML
40 INJECTION INTRAMUSCULAR; INTRAVENOUS EVERY 12 HOURS
Status: DISCONTINUED | OUTPATIENT
Start: 2023-05-02 | End: 2023-05-02

## 2023-05-02 RX ORDER — BUMETANIDE 0.25 MG/ML
2 INJECTION INTRAMUSCULAR; INTRAVENOUS EVERY 12 HOURS
Status: DISPENSED | OUTPATIENT
Start: 2023-05-02 | End: 2023-05-03

## 2023-05-02 RX ORDER — ACETAMINOPHEN 325 MG/1
650 TABLET ORAL EVERY 4 HOURS PRN
Status: DISCONTINUED | OUTPATIENT
Start: 2023-05-02 | End: 2023-05-05 | Stop reason: HOSPADM

## 2023-05-02 RX ORDER — DEXTROSE MONOHYDRATE 25 G/50ML
10-50 INJECTION, SOLUTION INTRAVENOUS
Status: DISCONTINUED | OUTPATIENT
Start: 2023-05-02 | End: 2023-05-03

## 2023-05-02 RX ORDER — IPRATROPIUM BROMIDE AND ALBUTEROL SULFATE 2.5; .5 MG/3ML; MG/3ML
3 SOLUTION RESPIRATORY (INHALATION) EVERY 6 HOURS PRN
Status: DISCONTINUED | OUTPATIENT
Start: 2023-05-02 | End: 2023-05-05 | Stop reason: HOSPADM

## 2023-05-02 RX ORDER — TAMSULOSIN HYDROCHLORIDE 0.4 MG/1
0.4 CAPSULE ORAL DAILY
Status: DISCONTINUED | OUTPATIENT
Start: 2023-05-02 | End: 2023-05-05 | Stop reason: HOSPADM

## 2023-05-02 RX ORDER — ONDANSETRON 4 MG/1
4 TABLET, FILM COATED ORAL EVERY 6 HOURS PRN
Status: DISCONTINUED | OUTPATIENT
Start: 2023-05-02 | End: 2023-05-05 | Stop reason: HOSPADM

## 2023-05-02 RX ORDER — ROSUVASTATIN CALCIUM 10 MG/1
10 TABLET, COATED ORAL DAILY
Status: DISCONTINUED | OUTPATIENT
Start: 2023-05-02 | End: 2023-05-05 | Stop reason: HOSPADM

## 2023-05-02 RX ORDER — SODIUM BICARBONATE 650 MG/1
650 TABLET ORAL 3 TIMES DAILY
Status: DISCONTINUED | OUTPATIENT
Start: 2023-05-02 | End: 2023-05-05 | Stop reason: HOSPADM

## 2023-05-02 RX ORDER — CARVEDILOL 6.25 MG/1
3.12 TABLET ORAL 2 TIMES DAILY WITH MEALS
Status: DISCONTINUED | OUTPATIENT
Start: 2023-05-02 | End: 2023-05-02

## 2023-05-02 RX ORDER — BUDESONIDE AND FORMOTEROL FUMARATE DIHYDRATE 160; 4.5 UG/1; UG/1
2 AEROSOL RESPIRATORY (INHALATION)
Status: DISCONTINUED | OUTPATIENT
Start: 2023-05-02 | End: 2023-05-05 | Stop reason: HOSPADM

## 2023-05-02 RX ORDER — LEVOTHYROXINE SODIUM 0.07 MG/1
75 TABLET ORAL
Status: DISCONTINUED | OUTPATIENT
Start: 2023-05-03 | End: 2023-05-05 | Stop reason: HOSPADM

## 2023-05-02 RX ORDER — FAMOTIDINE 20 MG/1
10 TABLET, FILM COATED ORAL 2 TIMES DAILY PRN
Status: DISCONTINUED | OUTPATIENT
Start: 2023-05-02 | End: 2023-05-05 | Stop reason: HOSPADM

## 2023-05-02 RX ORDER — HYDRALAZINE HYDROCHLORIDE 25 MG/1
25 TABLET, FILM COATED ORAL 3 TIMES DAILY
Status: DISCONTINUED | OUTPATIENT
Start: 2023-05-02 | End: 2023-05-05 | Stop reason: HOSPADM

## 2023-05-02 RX ORDER — FAMOTIDINE 10 MG
10 TABLET ORAL 2 TIMES DAILY PRN
COMMUNITY

## 2023-05-02 RX ORDER — ONDANSETRON 2 MG/ML
4 INJECTION INTRAMUSCULAR; INTRAVENOUS EVERY 6 HOURS PRN
Status: DISCONTINUED | OUTPATIENT
Start: 2023-05-02 | End: 2023-05-05 | Stop reason: HOSPADM

## 2023-05-02 RX ORDER — NICOTINE POLACRILEX 4 MG
15 LOZENGE BUCCAL
Status: DISCONTINUED | OUTPATIENT
Start: 2023-05-02 | End: 2023-05-03

## 2023-05-02 RX ORDER — ACETAMINOPHEN 160 MG/5ML
650 SOLUTION ORAL EVERY 4 HOURS PRN
Status: DISCONTINUED | OUTPATIENT
Start: 2023-05-02 | End: 2023-05-05 | Stop reason: HOSPADM

## 2023-05-02 RX ORDER — INSULIN LISPRO 100 [IU]/ML
1-200 INJECTION, SOLUTION INTRAVENOUS; SUBCUTANEOUS
Status: DISCONTINUED | OUTPATIENT
Start: 2023-05-02 | End: 2023-05-03

## 2023-05-02 RX ORDER — AMLODIPINE BESYLATE 5 MG/1
5 TABLET ORAL DAILY
Status: DISCONTINUED | OUTPATIENT
Start: 2023-05-02 | End: 2023-05-05 | Stop reason: HOSPADM

## 2023-05-02 RX ORDER — IBUPROFEN 600 MG/1
1 TABLET ORAL
Status: DISCONTINUED | OUTPATIENT
Start: 2023-05-02 | End: 2023-05-05 | Stop reason: HOSPADM

## 2023-05-02 RX ORDER — INSULIN LISPRO 100 [IU]/ML
1-200 INJECTION, SOLUTION INTRAVENOUS; SUBCUTANEOUS AS NEEDED
Status: DISCONTINUED | OUTPATIENT
Start: 2023-05-02 | End: 2023-05-03

## 2023-05-02 RX ORDER — ASPIRIN 81 MG/1
81 TABLET ORAL DAILY
Status: DISCONTINUED | OUTPATIENT
Start: 2023-05-02 | End: 2023-05-05 | Stop reason: HOSPADM

## 2023-05-02 RX ORDER — CARVEDILOL 6.25 MG/1
6.25 TABLET ORAL 2 TIMES DAILY WITH MEALS
Status: DISCONTINUED | OUTPATIENT
Start: 2023-05-02 | End: 2023-05-05 | Stop reason: HOSPADM

## 2023-05-02 RX ORDER — FUROSEMIDE 10 MG/ML
80 INJECTION INTRAMUSCULAR; INTRAVENOUS ONCE
Status: COMPLETED | OUTPATIENT
Start: 2023-05-02 | End: 2023-05-02

## 2023-05-02 RX ORDER — BUPROPION HYDROCHLORIDE 150 MG/1
150 TABLET ORAL DAILY
Status: DISCONTINUED | OUTPATIENT
Start: 2023-05-02 | End: 2023-05-05 | Stop reason: HOSPADM

## 2023-05-02 RX ORDER — NICOTINE POLACRILEX 4 MG
15 LOZENGE BUCCAL
Status: DISCONTINUED | OUTPATIENT
Start: 2023-05-02 | End: 2023-05-05 | Stop reason: HOSPADM

## 2023-05-02 RX ORDER — DEXTROSE MONOHYDRATE 25 G/50ML
25 INJECTION, SOLUTION INTRAVENOUS
Status: DISCONTINUED | OUTPATIENT
Start: 2023-05-02 | End: 2023-05-05 | Stop reason: HOSPADM

## 2023-05-02 RX ADMIN — APIXABAN 5 MG: 5 TABLET, FILM COATED ORAL at 21:30

## 2023-05-02 RX ADMIN — TIOTROPIUM BROMIDE INHALATION SPRAY 2 PUFF: 3.12 SPRAY, METERED RESPIRATORY (INHALATION) at 21:39

## 2023-05-02 RX ADMIN — INSULIN LISPRO 3 UNITS: 100 INJECTION, SOLUTION INTRAVENOUS; SUBCUTANEOUS at 17:40

## 2023-05-02 RX ADMIN — CARVEDILOL 3.12 MG: 6.25 TABLET, FILM COATED ORAL at 12:54

## 2023-05-02 RX ADMIN — HYDRALAZINE HYDROCHLORIDE 25 MG: 25 TABLET, FILM COATED ORAL at 16:53

## 2023-05-02 RX ADMIN — TAMSULOSIN HYDROCHLORIDE 0.4 MG: 0.4 CAPSULE ORAL at 13:37

## 2023-05-02 RX ADMIN — AMLODIPINE BESYLATE 5 MG: 5 TABLET ORAL at 12:54

## 2023-05-02 RX ADMIN — ROSUVASTATIN CALCIUM 10 MG: 10 TABLET, FILM COATED ORAL at 13:37

## 2023-05-02 RX ADMIN — SODIUM BICARBONATE 650 MG: 650 TABLET ORAL at 17:40

## 2023-05-02 RX ADMIN — BUPROPION HYDROCHLORIDE 150 MG: 150 TABLET, EXTENDED RELEASE ORAL at 13:37

## 2023-05-02 RX ADMIN — FUROSEMIDE 80 MG: 10 INJECTION, SOLUTION INTRAMUSCULAR; INTRAVENOUS at 08:31

## 2023-05-02 RX ADMIN — SODIUM BICARBONATE 650 MG: 650 TABLET ORAL at 21:30

## 2023-05-02 RX ADMIN — BUDESONIDE AND FORMOTEROL FUMARATE DIHYDRATE 2 PUFF: 160; 4.5 AEROSOL RESPIRATORY (INHALATION) at 21:34

## 2023-05-02 RX ADMIN — APIXABAN 5 MG: 5 TABLET, FILM COATED ORAL at 12:54

## 2023-05-02 RX ADMIN — FUROSEMIDE 40 MG: 10 INJECTION, SOLUTION INTRAMUSCULAR; INTRAVENOUS at 17:40

## 2023-05-02 RX ADMIN — CARVEDILOL 6.25 MG: 6.25 TABLET, FILM COATED ORAL at 17:40

## 2023-05-02 RX ADMIN — ASPIRIN 81 MG: 81 TABLET, COATED ORAL at 12:54

## 2023-05-02 RX ADMIN — HYDRALAZINE HYDROCHLORIDE 25 MG: 25 TABLET, FILM COATED ORAL at 21:30

## 2023-05-02 RX ADMIN — INSULIN GLARGINE-YFGN 20 UNITS: 100 INJECTION, SOLUTION SUBCUTANEOUS at 21:30

## 2023-05-02 RX ADMIN — NITROGLYCERIN 1 INCH: 20 OINTMENT TOPICAL at 08:32

## 2023-05-02 NOTE — H&P
Patient Name:  Carlito Mo  YOB: 1955  MRN:  [id number]  Admit Date:  5/2/2023  Patient Care Team:  Florencia Caballero MD as PCP - General (Internal Medicine)  Amber Murguia MD as Consulting Physician (Cardiology)  [facility] as Pharmacist  Seth Ladd MD as Surgeon (General Surgery)  Kim Hoyos MD PhD as Consulting Physician (Hematology and Oncology)  Jerome Diallo MD as Referring Physician (Family Medicine)  Jose Enrique Louie MD as Consulting Physician (Gastroenterology)  Nima Huddleston MD as Consulting Physician (Nephrology)      Subjective   History Present Illness     Chief Complaint   Patient presents with   • Shortness of Breath       Mr. Mo is a 67 y.o. male with a history of multiple medical issues, including but not limited to, hypothyroidism, CHF, CKD IV, anemia, CAD, COPD, DM, HTN, pleural effusion requiring thoracentesis, paroxysmal afib that presents to [facility] complaining of dyspnea. He woke up feeling more soa than baseline. Uses O2 as needed at home. Recently had rt thoracentesis for pleural effusion. Admits to possibly missing doses of diuretic recently. C/O mild chest pressure, tightness. Denies fever, productive cough, n/v/d, dysuria, abd distention. Has LE edema. Given IV lasix w/some improvement. Currently requiring O2 2L NC.    Afebrile. HR controlled. BP 160s-170s systolic. O2 2L NC. WBC 6.28, Hgb 8.8. Procal 0.10. HS trop 98-->96. RVP neg. Gluc 138, BUN/Cr 69/3.55, K 5.3, Cl 114, CO2 19.9. BNP 8501. INR 1.23. CXR: central pulm vasc congestion w/patchy airspace & interstitial opacities, rt > lt, more dense opacity lung bases rt > lt; mod rt pleural effusion. Concerning for pulm edema vs multifocal pneumonia    Recent rt thoracentesis on 4/14/23, 1.8L fluid removed      Review of Systems   Constitutional: Negative for appetite change and fever.   HENT: Negative for congestion.    Respiratory: Positive for chest  tightness and shortness of breath.    Cardiovascular: Positive for leg swelling.   Gastrointestinal: Negative for diarrhea, nausea and vomiting.   Genitourinary: Negative for dysuria.   Musculoskeletal: Negative for arthralgias.   Skin: Negative for rash.   Neurological: Negative for headaches.   Psychiatric/Behavioral: Negative for sleep disturbance.        Personal History     Past Medical History:   Diagnosis Date   • Acquired hypothyroidism    • Acute congestive heart failure    • Acute respiratory failure with hypoxia    • Acute sinusitis    • SYLVAIN (acute kidney injury)     on CKD   • Anemia    • Arthritis    • CAD (coronary artery disease)    • Cancer     skin   • Chronic combined systolic and diastolic congestive heart failure    • Chronic ischemic heart disease    • Chronic kidney disease (CKD)    • CKD (chronic kidney disease)    • COPD (chronic obstructive pulmonary disease)    • Depression    • Diabetes mellitus type 2, uncontrolled, without complications    • Diabetic neuropathy    • Disease of thyroid gland    • Elevated cholesterol    • Fatigue    • Frequent PVCs    • Generalized weakness    • GERD (gastroesophageal reflux disease)    • Heart attack    • History of coronary artery disease     with remote history of bypass surgery in 2001   • Hyperlipidemia    • Hypertension    • Hypertensive encephalopathy    • Mental status change     Acute   • NO DEVICE/RECALLED    • Pleural effusion    • Pneumonia    • Poor historian    • RBBB (right bundle branch block)    • Stroke     POOR VISION   • TIA (transient ischemic attack)    • Type 2 diabetes mellitus    • Urinary retention    • Vitamin D deficiency      Past Surgical History:   Procedure Laterality Date   • APPENDECTOMY N/A 02/14/2016    Dr. Alexey Dodson   • ARTERIOVENOUS FISTULA/SHUNT SURGERY Right 06/03/2022    Procedure: RIGHT FOREARM ARTERIOVENOUS FISTULA PLACEMENT RADIOCEPHALIC WITH CEPHALIC VEIN TRANSPOSITION;  Surgeon: Eliseo Willis MD;   Location: [facility] MAIN OR;  Service: Vascular;  Laterality: Right;   • COLONOSCOPY      over 20 years ago.  no polyps    • COLONOSCOPY N/A 2018    Procedure: COLONOSCOPY;  Surgeon: Jose Enrique Louie MD;  Location: [facility] ENDOSCOPY;  Service: Gastroenterology   • COLONOSCOPY N/A 2018    IH, EH, polyps (TAs w/low grade dysplasia)   • COLONOSCOPY N/A 2020    Procedure: COLONOSCOPY;  Surgeon: Jose Enrique Louie MD;  Location: [facility] ENDOSCOPY;  Service: Gastroenterology;  Laterality: N/A;  Pre op-Anemia, Melena, History of Polyps  Post op-To Cecum/TI, Polyp, Poor Prep   • CORONARY ARTERY BYPASS GRAFT  2001   • ENDOSCOPY N/A 2020    Procedure: ESOPHAGOGASTRODUODENOSCOPY with biopsies;  Surgeon: Amanda Lovelace MD;  Location: [facility] ENDOSCOPY;  Service: Gastroenterology;  Laterality: N/A;  pre-anemia, dark stools  post-esophagitis, hiatal hernia   • ENDOSCOPY N/A 09/15/2020    Procedure: ESOPHAGOGASTRODUODENOSCOPY WITH BIOPSIES;  Surgeon: Jose Enrique Louie MD;  Location: [facility] ENDOSCOPY;  Service: Gastroenterology;  Laterality: N/A;  pre: history of melena and esophagitis  post: mild esophagitis and gastritis, small hiatal hernia   • HERNIA REPAIR Left 2019   • THORACENTESIS     • TONSILLECTOMY     • VASECTOMY       Family History   Problem Relation Age of Onset   • Diabetes Mother    • Hypertension Mother    • Macular degeneration Mother    • Alcohol abuse Father    • Cancer Father         lung,  age 65   • Heart disease Father    • Alcohol abuse Brother    • Cirrhosis Brother          age 50   • Liver cancer Brother 45   • Diabetes Son    • Kidney failure Son    • Autism Son    • Malig Hyperthermia Neg Hx      Social History     Tobacco Use   • Smoking status: Never   • Smokeless tobacco: Never   • Tobacco comments:     CAFFEINE - OCCAS. SODA   Vaping Use   • Vaping Use: Never used   Substance Use Topics   • Alcohol use: No     Comment: last drink 2 months ago    • Drug use: No     No current facility-administered medications on file prior to encounter.     Current Outpatient Medications on File Prior to Encounter   Medication Sig Dispense Refill   • acetaminophen (TYLENOL) 325 MG tablet Take 2 tablets by mouth Every 4 (Four) Hours As Needed for Mild Pain.     • amLODIPine (NORVASC) 5 MG tablet Take 1 tablet by mouth Daily.     • apixaban (ELIQUIS) 5 MG tablet tablet Take 1 tablet by mouth Every 12 (Twelve) Hours. Indications: Atrial Fibrillation 60 tablet    • aspirin 81 MG EC tablet Take 1 tablet by mouth Daily. 90 tablet 3   • buPROPion XL (WELLBUTRIN XL) 150 MG 24 hr tablet Take 1 tablet by mouth Daily.     • carvedilol (COREG) 3.125 MG tablet Take 1 tablet by mouth 2 (Two) Times a Day With Meals.     • famotidine (PEPCID) 10 MG tablet Take 1 tablet by mouth 2 (Two) Times a Day As Needed for Heartburn.     • furosemide (LASIX) 20 MG tablet Take 10 mg by mouth Daily. Take one half tablet daily     • hydrALAZINE (APRESOLINE) 25 MG tablet Take 1 tablet by mouth 3 (Three) Times a Day.     • Insulin Glargine w/ Trans Port (Basaglar Tempo Pen) 100 UNIT/ML solution pen-injector Inject 2 Units under the skin into the appropriate area as directed Every Night.     • insulin glulisine (Apidra) 100 UNIT/ML injection Inject 3 Units under the skin into the appropriate area as directed 3 (Three) Times a Day Before Meals.     • ipratropium-albuterol (COMBIVENT RESPIMAT)  MCG/ACT inhaler Inhale 1 puff 4 (Four) Times a Day As Needed for Wheezing.     • levothyroxine (SYNTHROID, LEVOTHROID) 75 MCG tablet Take 1 tablet by mouth Daily. 30 tablet 5   • rosuvastatin (CRESTOR) 40 MG tablet Take 1 tablet by mouth Daily.     • tamsulosin (FLOMAX) 0.4 MG capsule 24 hr capsule Take 1 capsule by mouth Daily.     • [DISCONTINUED] pantoprazole (PROTONIX) 20 MG EC tablet Take 1 tablet by mouth Daily.       Allergies   Allergen Reactions   • Prozac [Fluoxetine Hcl] Other (See Comments)      shaking       Objective    Objective     Vital Signs  Temp:  [97.8 °F (36.6 °C)] 97.8 °F (36.6 °C)  Heart Rate:  [75-89] 86  Resp:  [20-24] 20  BP: (167-178)/() 173/98  SpO2:  [92 %-96 %] 94 %  on  Flow (L/min):  [2] 2;   Device (Oxygen Therapy): nasal cannula  Body mass index is 23.26 kg/m².    Physical Exam  Vitals and nursing note reviewed.   Constitutional:       Appearance: He is ill-appearing. He is not toxic-appearing.      Comments: Acute on chronically ill appearance   HENT:      Head: Normocephalic.      Mouth/Throat:      Mouth: Mucous membranes are moist.   Eyes:      Conjunctiva/sclera: Conjunctivae normal.   Cardiovascular:      Rate and Rhythm: Normal rate and regular rhythm.   Pulmonary:      Effort: Prolonged expiration present.      Breath sounds: Decreased breath sounds present.      Comments: Respirations labored  Abdominal:      General: Bowel sounds are normal.      Palpations: Abdomen is soft.   Musculoskeletal:      Cervical back: Neck supple.      Right lower leg: Edema present.      Left lower leg: Edema present.   Skin:     General: Skin is warm and dry.   Neurological:      Mental Status: He is alert and oriented to person, place, and time.   Psychiatric:         Mood and Affect: Mood normal.         Behavior: Behavior normal.         Results Review:  I reviewed the patient's new clinical results.  I reviewed the patient's new imaging results and agree with the interpretation.  I reviewed the patient's other test results and agree with the interpretation  I personally viewed and interpreted the patient's EKG/Telemetry data  Discussed with ED provider.    Lab Results (last 24 hours)     Procedure Component Value Units Date/Time    CBC & Differential [[id number]]  (Abnormal) Collected: 05/02/23 0720    Specimen: Blood Updated: 05/02/23 0732    Narrative:      The following orders were created for panel order CBC & Differential.  Procedure                               Abnormality          Status                     ---------                               -----------         ------                     CBC Auto Differential[[id number]]        Abnormal            Final result                 Please view results for these tests on the individual orders.    Comprehensive Metabolic Panel [[id number]]  (Abnormal) Collected: 05/02/23 0720    Specimen: Blood Updated: 05/02/23 0750     Glucose 138 mg/dL      BUN 69 mg/dL      Creatinine 3.55 mg/dL      Sodium 145 mmol/L      Potassium 5.3 mmol/L      Chloride 114 mmol/L      CO2 19.9 mmol/L      Calcium 9.3 mg/dL      Total Protein 6.8 g/dL      Albumin 4.0 g/dL      ALT (SGPT) 27 U/L      AST (SGOT) 27 U/L      Alkaline Phosphatase 108 U/L      Total Bilirubin 0.3 mg/dL      Globulin 2.8 gm/dL      A/G Ratio 1.4 g/dL      BUN/Creatinine Ratio 19.4     Anion Gap 11.1 mmol/L      eGFR 18.0 mL/min/1.73     Narrative:      GFR Normal >60  Chronic Kidney Disease <60  Kidney Failure <15      Protime-INR [[id number]]  (Abnormal) Collected: 05/02/23 0720    Specimen: Blood Updated: 05/02/23 0745     Protime 15.6 Seconds      INR 1.23    aPTT [[id number]]  (Normal) Collected: 05/02/23 0720    Specimen: Blood Updated: 05/02/23 0745     PTT 28.7 seconds     BNP [[id number]]  (Abnormal) Collected: 05/02/23 0720    Specimen: Blood Updated: 05/02/23 0757     proBNP 8,501.0 pg/mL     Narrative:      Among patients with dyspnea, NT-proBNP is highly sensitive for the detection of acute congestive heart failure. In addition NT-proBNP of <300 pg/ml effectively rules out acute congestive heart failure with 99% negative predictive value.    Results may be falsely decreased if patient taking Biotin.      High Sensitivity Troponin T [[id number]]  (Abnormal) Collected: 05/02/23 0720    Specimen: Blood Updated: 05/02/23 0802     HS Troponin T 98 ng/L     Narrative:      High Sensitive Troponin T Reference Range:  <10.0 ng/L- Negative Female for AMI  <15.0 ng/L- Negative Male for  "AMI  >=10 - Abnormal Female indicating possible myocardial injury.  >=15 - Abnormal Male indicating possible myocardial injury.   Clinicians would have to utilize clinical acumen, EKG, Troponin, and serial changes to determine if it is an Acute Myocardial Infarction or myocardial injury due to an underlying chronic condition.         Procalcitonin [[id number]]  (Normal) Collected: 05/02/23 0720    Specimen: Blood Updated: 05/02/23 0757     Procalcitonin 0.10 ng/mL     Narrative:      As a Marker for Sepsis (Non-Neonates):    1. <0.5 ng/mL represents a low risk of severe sepsis and/or septic shock.  2. >2 ng/mL represents a high risk of severe sepsis and/or septic shock.    As a Marker for Lower Respiratory Tract Infections that require antibiotic therapy:    PCT on Admission    Antibiotic Therapy       6-12 Hrs later    >0.5                Strongly Recommended  >0.25 - <0.5        Recommended   0.1 - 0.25          Discouraged              Remeasure/reassess PCT  <0.1                Strongly Discouraged     Remeasure/reassess PCT    As 28 day mortality risk marker: \"Change in Procalcitonin Result\" (>80% or <=80%) if Day 0 (or Day 1) and Day 4 values are available. Refer to http://[email or web address]-pct-calculator.com    Change in PCT <=80%  A decrease of PCT levels below or equal to 80% defines a positive change in PCT test result representing a higher risk for 28-day all-cause mortality of patients diagnosed with severe sepsis for septic shock.    Change in PCT >80%  A decrease of PCT levels of more than 80% defines a negative change in PCT result representing a lower risk for 28-day all-cause mortality of patients diagnosed with severe sepsis or septic shock.       CBC Auto Differential [[id number]]  (Abnormal) Collected: 05/02/23 0720    Specimen: Blood Updated: 05/02/23 0732     WBC 6.28 10*3/mm3      RBC 3.25 10*6/mm3      Hemoglobin 8.8 g/dL      Hematocrit 27.8 %      MCV 85.5 fL      MCH 27.1 pg      MCHC 31.7 g/dL     "  RDW 14.1 %      RDW-SD 43.4 fl      MPV 10.1 fL      Platelets 248 10*3/mm3      Neutrophil % 75.5 %      Lymphocyte % 11.9 %      Monocyte % 8.6 %      Eosinophil % 3.3 %      Basophil % 0.2 %      Immature Grans % 0.5 %      Neutrophils, Absolute 4.74 10*3/mm3      Lymphocytes, Absolute 0.75 10*3/mm3      Monocytes, Absolute 0.54 10*3/mm3      Eosinophils, Absolute 0.21 10*3/mm3      Basophils, Absolute 0.01 10*3/mm3      Immature Grans, Absolute 0.03 10*3/mm3      nRBC 0.0 /100 WBC     Respiratory Panel PCR w/COVID-19(SARS-CoV-2) SUKUMAR/TERRELL/RAISSA/PAD/COR/MAD/ARNALDO In-House, NP Swab in UTM/VTM, 3-4 HR TAT - Swab, Nasopharynx [[id number]]  (Normal) Collected: 05/02/23 0722    Specimen: Swab from Nasopharynx Updated: 05/02/23 0905     ADENOVIRUS, PCR Not Detected     Coronavirus 229E Not Detected     Coronavirus HKU1 Not Detected     Coronavirus NL63 Not Detected     Coronavirus OC43 Not Detected     COVID19 Not Detected     Human Metapneumovirus Not Detected     Human Rhinovirus/Enterovirus Not Detected     Influenza A PCR Not Detected     Influenza B PCR Not Detected     Parainfluenza Virus 1 Not Detected     Parainfluenza Virus 2 Not Detected     Parainfluenza Virus 3 Not Detected     Parainfluenza Virus 4 Not Detected     RSV, PCR Not Detected     Bordetella pertussis pcr Not Detected     Bordetella parapertussis PCR Not Detected     Chlamydophila pneumoniae PCR Not Detected     Mycoplasma pneumo by PCR Not Detected    Narrative:      In the setting of a positive respiratory panel with a viral infection PLUS a negative procalcitonin without other underlying concern for bacterial infection, consider observing off antibiotics or discontinuation of antibiotics and continue supportive care. If the respiratory panel is positive for atypical bacterial infection (Bordetella pertussis, Chlamydophila pneumoniae, or Mycoplasma pneumoniae), consider antibiotic de-escalation to target atypical bacterial infection.    High  Sensitivity Troponin T 2Hr [[id number]]  (Abnormal) Collected: 05/02/23 0913    Specimen: Blood Updated: 05/02/23 0944     HS Troponin T 96 ng/L      Troponin T Delta -2 ng/L     Narrative:      High Sensitive Troponin T Reference Range:  <10.0 ng/L- Negative Female for AMI  <15.0 ng/L- Negative Male for AMI  >=10 - Abnormal Female indicating possible myocardial injury.  >=15 - Abnormal Male indicating possible myocardial injury.   Clinicians would have to utilize clinical acumen, EKG, Troponin, and serial changes to determine if it is an Acute Myocardial Infarction or myocardial injury due to an underlying chronic condition.         POC Glucose Once [[id number]]  (Normal) Collected: 05/02/23 1205    Specimen: Blood Updated: 05/02/23 1205     Glucose 122 mg/dL      Comment: Meter: [id number] : [id number] Isai Jacobson RN             Imaging Results (Last 24 Hours)     Procedure Component Value Units Date/Time    XR Chest 1 View [[id number]] Collected: 05/02/23 0721     Updated: 05/02/23 0727    Narrative:      XR CHEST 1 VW-     HISTORY:  Dyspnea, COPD.     COMPARISON:  Chest radiograph 03/08/2023     FINDINGS:    A single view of the chest was obtained.  The cardiac silhouette is partially obscured. There are postsurgical  changes from CABG. There is calcific aortic atherosclerosis. There is  central pulmonary venous congestion with relatively diffuse patchy  airspace and interstitial opacities, right greater than left, as well as  more dense opacity at the lung bases, also right greater than left.  There is a moderate right pleural effusion. Findings are concerning for  pulmonary edema versus multifocal pneumonia in the appropriate clinical  setting, new from 03/8/2023, likely also with a component of  atelectasis. Short-term follow-up chest radiographs are recommended to  document resolution.  Median sternotomy wires are present.     This report was finalized on 5/2/2023 7:24 AM by Dr. Katrin Cui M.D.              Results for orders placed during the hospital encounter of 03/06/23    Adult Transthoracic Echo Complete W/ Cont if Necessary Per Protocol    Interpretation Summary  •  There is akinesis of the mid to distal inferoseptum, distal inferior wall, and inferior apex  •  Left ventricular ejection fraction appears to be 41 - 45%.  •  Left ventricular diastolic function is consistent with (grade III w/high LAP) fixed restrictive pattern.  •  The right ventricular cavity is mildly dilated. Normal right ventricular systolic function noted.  •  The left atrial cavity is moderately dilated.  •  Mild tricuspid valve regurgitation is present.  •  Calculated right ventricular systolic pressure from tricuspid regurgitation is 59 mmHg.  •  The inferior vena cava is dilated. IVC inspiratory collapse is absent.  •  There is no evidence of pericardial effusion.      ECG 12 Lead Dyspnea   Preliminary Result   HEART RATE= 74  bpm   RR Interval= 811  ms   NM Interval= 245  ms   P Horizontal Axis= -25  deg   P Front Axis= 1  deg   QRSD Interval= 148  ms   QT Interval= 430  ms   QRS Axis= -66  deg   T Wave Axis= 65  deg   - ABNORMAL ECG -   Sinus rhythm   Prolonged NM interval   Right bundle branch block   Anteroseptal infarct, age indeterminate   Baseline wander in lead(s) V4,V5   Electronically Signed By:    Date and Time of Study: 2023-05-02 06:39:53           Assessment/Plan     Active Hospital Problems    Diagnosis  POA   • **Acute combined systolic and diastolic congestive heart failure [I50.41]  Yes   • History of stroke [Z86.73]  Not Applicable   • Paroxysmal atrial fibrillation [I48.0]  Yes   • Chronic anticoagulation [Z79.01]  Not Applicable   • CKD (chronic kidney disease) stage 4, GFR 15-29 ml/min [N18.4]  Yes   • Anemia, chronic disease [D63.8]  Yes   • Essential hypertension [I10]  Yes   • Chronically elevated troponin [R77.8]  Yes   • YAW (obstructive sleep apnea) [G47.33]  Yes   • Pleural effusion [J90]  Yes   •  Acquired hypothyroidism [E03.9]  Yes      Resolved Hospital Problems   No resolved problems to display.       Mr. Mo is a 67 y.o. male with a history of multiple medical issues, including but not limited to, hypothyroidism, CHF, CKD IV, anemia, CAD, COPD, DM, HTN, pleural effusion requiring thoracentesis, paroxysmal afib who is admitted for acute on chronic combined systolic and diastolic CHF with rt pleural effusion     Acute on chronic systolic & diastolic CHF/Pleural effusion//Paroxysmal afib/Chronically elevated troponin:  -IV lasix given in ED. Cardiology consult. Strict I&O. Daily weights. Last thoracentesis 4/14/23, monitor response w/diuresis, may need to repeat. HR controlled. HS troponin appears to be the same. Echo 3/6/23    CKD IV:  -Nephrology consulted to assist w/diuresis. Avoid nephrotoxic meds. Cr near baseline    HTN:  -Monitor BP. Home meds restarted    Hypothyroidism:  -TSH 2.490 3/2023. Continue levothyroxine    Anemia:  -Monitor Hgb. Iron level wnl 3/2023. Hgb at baseline    Hx stroke:  -ASA, statin      · I discussed the patient's findings and my recommendations with patient.    VTE Prophylaxis - Eliquis (home med).  Code Status - Full code.       JI Sommers  [facility]  05/02/23  12:13 EDT

## 2023-05-02 NOTE — PROGRESS NOTES
Clinical Pharmacy Services: Medication History    Carlito Mo is a 67 y.o. male presenting to [facility] for   Chief Complaint   Patient presents with   • Shortness of Breath       He  has a past medical history of Acquired hypothyroidism, Acute congestive heart failure, Acute respiratory failure with hypoxia, Acute sinusitis, SYLVAIN (acute kidney injury), Anemia, Arthritis, CAD (coronary artery disease), Cancer, Chronic combined systolic and diastolic congestive heart failure, Chronic ischemic heart disease, Chronic kidney disease (CKD), CKD (chronic kidney disease), COPD (chronic obstructive pulmonary disease), Depression, Diabetes mellitus type 2, uncontrolled, without complications, Diabetic neuropathy, Disease of thyroid gland, Elevated cholesterol, Fatigue, Frequent PVCs, Generalized weakness, GERD (gastroesophageal reflux disease), Heart attack, History of coronary artery disease, Hyperlipidemia, Hypertension, Hypertensive encephalopathy, Mental status change, NO DEVICE/RECALLED, Pleural effusion, Pneumonia, Poor historian, RBBB (right bundle branch block), Stroke, TIA (transient ischemic attack), Type 2 diabetes mellitus, Urinary retention, and Vitamin D deficiency.    Allergies as of 05/02/2023 - Reviewed 05/02/2023   Allergen Reaction Noted   • Prozac [fluoxetine hcl] Other (See Comments) 12/17/2015       Medication information was obtained from: Patient/Pharmacy  Pharmacy and Phone Number:   [facility] [place]  [address] [place] [address]  Phone: [phone] Fax: [phone]    [id number] - [place], [place] - [address] - [phone]  - [phone]  [address] [address]  Phone: [phone] Fax: [phone]    [facility] - [place], [place] - [address] - [phone]  - [phone] FX  [address]  [facility] [address]  Phone: [phone] Fax: [phone]    [facility] -  [place], [place] - [address] [address] [phone] [facility] [phone]   [address] [facility] [address]  Phone: [phone] Fax: [phone]        Prior to Admission Medications     Prescriptions Last Dose Informant Patient Reported? Taking?    acetaminophen (TYLENOL) 325 MG tablet  Self No Yes    Take 2 tablets by mouth Every 4 (Four) Hours As Needed for Mild Pain.    amLODIPine (NORVASC) 5 MG tablet  Pharmacy Yes Yes    Take 1 tablet by mouth Daily.    apixaban (ELIQUIS) 5 MG tablet tablet  Pharmacy No Yes    Take 1 tablet by mouth Every 12 (Twelve) Hours. Indications: Atrial Fibrillation    aspirin 81 MG EC tablet  Self No Yes    Take 1 tablet by mouth Daily.    buPROPion XL (WELLBUTRIN XL) 150 MG 24 hr tablet  Pharmacy Yes Yes    Take 1 tablet by mouth Daily.    carvedilol (COREG) 3.125 MG tablet  Pharmacy Yes Yes    Take 1 tablet by mouth 2 (Two) Times a Day With Meals.    famotidine (PEPCID) 10 MG tablet  Pharmacy Yes Yes    Take 1 tablet by mouth 2 (Two) Times a Day As Needed for Heartburn.    furosemide (LASIX) 20 MG tablet  Pharmacy Yes Yes    Take 10 mg by mouth Daily. Take one half tablet daily    hydrALAZINE (APRESOLINE) 25 MG tablet  Pharmacy Yes Yes    Take 1 tablet by mouth 3 (Three) Times a Day.    Insulin Glargine w/ Trans Port (Basaglar Tempo Pen) 100 UNIT/ML solution pen-injector  Self Yes Yes    Inject 2 Units under the skin into the appropriate area as directed Every Night.    insulin glulisine (Apidra) 100 UNIT/ML injection  Self Yes Yes    Inject 3 Units under the skin into the appropriate area as directed 3 (Three) Times a Day Before Meals.    ipratropium-albuterol (COMBIVENT RESPIMAT)  MCG/ACT inhaler  Pharmacy Yes Yes    Inhale 1 puff 4 (Four) Times a Day As Needed for Wheezing.    levothyroxine (SYNTHROID, LEVOTHROID) 75 MCG tablet   No Yes    Take 1 tablet by mouth Daily.    rosuvastatin (CRESTOR) 40 MG tablet  Pharmacy Yes Yes    Take 1 tablet by mouth  Daily.    tamsulosin (FLOMAX) 0.4 MG capsule 24 hr capsule  Medication Bottle Yes Yes    Take 1 capsule by mouth Daily.    pantoprazole (PROTONIX) 20 MG EC tablet   Yes No    Take 1 tablet by mouth Daily.            Medication notes: Patient states he has Apidra at home but does not use it often.     This medication list is complete to the best of my knowledge as of 5/2/2023    Please call if questions.    Contreras Eng  Medication History Technician  [phone]    5/2/2023 10:09 EDT

## 2023-05-02 NOTE — ED NOTES
Nursing report ED to floor  Carlito Mo  67 y.o.  male    HPI :   Chief Complaint   Patient presents with    Shortness of Breath       Admitting doctor:   Smith Henson MD    Admitting diagnosis:   The primary encounter diagnosis was Acute combined systolic and diastolic congestive heart failure. Diagnoses of Chronic kidney disease, unspecified CKD stage, Hypoxia, and Elevated troponin level not due myocardial infarction were also pertinent to this visit.    Code status:   Current Code Status       Date Active Code Status Order ID Comments User Context       5/2/2023 0926 CPR (Attempt to Resuscitate) [id number]  Barb Samuel APRN ED        Question Answer    Code Status (Patient has no pulse and is not breathing) CPR (Attempt to Resuscitate)    Medical Interventions (Patient has pulse or is breathing) Full Support                    Allergies:   Prozac [fluoxetine hcl]    Isolation:   No active isolations    Intake and Output  No intake or output data in the 24 hours ending 05/02/23 1036    Weight:       05/02/23  0602   Weight: 81.6 kg (180 lb)       Most recent vitals:   Vitals:    05/02/23 0802 05/02/23 0831 05/02/23 0930 05/02/23 1031   BP: 178/89 (!) 167/101 177/96 169/97   Pulse: 89 83 84 82   Resp:       Temp:       SpO2: 92%  93% 94%   Weight:       Height:           Active LDAs/IV Access:   Lines, Drains & Airways       Active LDAs       Name Placement date Placement time Site Days    Peripheral IV 05/02/23 0735 Left Antecubital 05/02/23  0735  Antecubital  less than 1                    Labs (abnormal labs have a star):   Labs Reviewed   COMPREHENSIVE METABOLIC PANEL - Abnormal; Notable for the following components:       Result Value    Glucose 138 (*)     BUN 69 (*)     Creatinine 3.55 (*)     Potassium 5.3 (*)     Chloride 114 (*)     CO2 19.9 (*)     eGFR 18.0 (*)     All other components within normal limits    Narrative:     GFR Normal >60  Chronic Kidney Disease <60  Kidney Failure  <15     PROTIME-INR - Abnormal; Notable for the following components:    Protime 15.6 (*)     INR 1.23 (*)     All other components within normal limits   BNP (IN-HOUSE) - Abnormal; Notable for the following components:    proBNP 8,501.0 (*)     All other components within normal limits    Narrative:     Among patients with dyspnea, NT-proBNP is highly sensitive for the detection of acute congestive heart failure. In addition NT-proBNP of <300 pg/ml effectively rules out acute congestive heart failure with 99% negative predictive value.    Results may be falsely decreased if patient taking Biotin.     TROPONIN - Abnormal; Notable for the following components:    HS Troponin T 98 (*)     All other components within normal limits    Narrative:     High Sensitive Troponin T Reference Range:  <10.0 ng/L- Negative Female for AMI  <15.0 ng/L- Negative Male for AMI  >=10 - Abnormal Female indicating possible myocardial injury.  >=15 - Abnormal Male indicating possible myocardial injury.   Clinicians would have to utilize clinical acumen, EKG, Troponin, and serial changes to determine if it is an Acute Myocardial Infarction or myocardial injury due to an underlying chronic condition.        CBC WITH AUTO DIFFERENTIAL - Abnormal; Notable for the following components:    RBC 3.25 (*)     Hemoglobin 8.8 (*)     Hematocrit 27.8 (*)     Lymphocyte % 11.9 (*)     All other components within normal limits   HIGH SENSITIVITIY TROPONIN T 2HR - Abnormal; Notable for the following components:    HS Troponin T 96 (*)     All other components within normal limits    Narrative:     High Sensitive Troponin T Reference Range:  <10.0 ng/L- Negative Female for AMI  <15.0 ng/L- Negative Male for AMI  >=10 - Abnormal Female indicating possible myocardial injury.  >=15 - Abnormal Male indicating possible myocardial injury.   Clinicians would have to utilize clinical acumen, EKG, Troponin, and serial changes to determine if it is an Acute  "Myocardial Infarction or myocardial injury due to an underlying chronic condition.        RESPIRATORY PANEL PCR W/ COVID-19 (SARS-COV-2) SUKUMAR/TERRELL/RAISSA/PAD/COR/M[facility]/ARNALDO IN-HOUSE, NP SWAB IN UTM/VTP, 3-4 HR TAT - Normal    Narrative:     In the setting of a positive respiratory panel with a viral infection PLUS a negative procalcitonin without other underlying concern for bacterial infection, consider observing off antibiotics or discontinuation of antibiotics and continue supportive care. If the respiratory panel is positive for atypical bacterial infection (Bordetella pertussis, Chlamydophila pneumoniae, or Mycoplasma pneumoniae), consider antibiotic de-escalation to target atypical bacterial infection.   APTT - Normal   PROCALCITONIN - Normal    Narrative:     As a Marker for Sepsis (Non-Neonates):    1. <0.5 ng/mL represents a low risk of severe sepsis and/or septic shock.  2. >2 ng/mL represents a high risk of severe sepsis and/or septic shock.    As a Marker for Lower Respiratory Tract Infections that require antibiotic therapy:    PCT on Admission    Antibiotic Therapy       6-12 Hrs later    >0.5                Strongly Recommended  >0.25 - <0.5        Recommended   0.1 - 0.25          Discouraged              Remeasure/reassess PCT  <0.1                Strongly Discouraged     Remeasure/reassess PCT    As 28 day mortality risk marker: \"Change in Procalcitonin Result\" (>80% or <=80%) if Day 0 (or Day 1) and Day 4 values are available. Refer to http://www.disco volantes-pct-calculator.com    Change in PCT <=80%  A decrease of PCT levels below or equal to 80% defines a positive change in PCT test result representing a higher risk for 28-day all-cause mortality of patients diagnosed with severe sepsis for septic shock.    Change in PCT >80%  A decrease of PCT levels of more than 80% defines a negative change in PCT result representing a lower risk for 28-day all-cause mortality of patients diagnosed with severe sepsis or " septic shock.      CBC AND DIFFERENTIAL    Narrative:     The following orders were created for panel order CBC & Differential.  Procedure                               Abnormality         Status                     ---------                               -----------         ------                     CBC Auto Differential[[id number]]        Abnormal            Final result                 Please view results for these tests on the individual orders.       EKG:   ECG 12 Lead Dyspnea   Preliminary Result   HEART RATE= 74  bpm   RR Interval= 811  ms   DE Interval= 245  ms   P Horizontal Axis= -25  deg   P Front Axis= 1  deg   QRSD Interval= 148  ms   QT Interval= 430  ms   QRS Axis= -66  deg   T Wave Axis= 65  deg   - ABNORMAL ECG -   Sinus rhythm   Prolonged DE interval   Right bundle branch block   Anteroseptal infarct, age indeterminate   Baseline wander in lead(s) V4,V5   Electronically Signed By:    Date and Time of Study: 2023-05-02 06:39:53          Meds given in ED:   Medications   acetaminophen (TYLENOL) tablet 650 mg (has no administration in time range)     Or   acetaminophen (TYLENOL) 160 MG/5ML solution 650 mg (has no administration in time range)     Or   acetaminophen (TYLENOL) suppository 650 mg (has no administration in time range)   ondansetron (ZOFRAN) tablet 4 mg (has no administration in time range)     Or   ondansetron (ZOFRAN) injection 4 mg (has no administration in time range)   nitroglycerin (NITROSTAT) ointment 1 inch (1 inch Topical Given 5/2/23 0832)   furosemide (LASIX) injection 80 mg (80 mg Intravenous Given 5/2/23 0831)       Imaging results:  No radiology results for the last day    Ambulatory status:   - assist    Social issues:   Social History     Socioeconomic History    Marital status:      Spouse name: Lissette    Number of children: 3    Years of education: college   Tobacco Use    Smoking status: Never    Smokeless tobacco: Never    Tobacco comments:     CAFFEINE - OCCAS.  SODA   Vaping Use    Vaping Use: Never used   Substance and Sexual Activity    Alcohol use: No     Comment: last drink 2 months ago    Drug use: No    Sexual activity: Defer       NIH Stroke Scale:         Jazmine Espinoza RN  05/02/23 10:36 EDT

## 2023-05-02 NOTE — ED TRIAGE NOTES
To ER via EMS from home.  C/o SOA since approx 2300.  Pt was not wearing his prescribed O2.  O2 Sat 89% RA.  O2 Sat 96% O2 2l/nc.  Pt with hx of CKD and pleural effusions.

## 2023-05-02 NOTE — ED PROVIDER NOTES
EMERGENCY DEPARTMENT ENCOUNTER    Room Number:  15/15  Date seen:  5/2/2023  PCP: Florencia Caballero MD        HPI:  Chief Complaint: Dyspnea    Context: Carlito Mo is a 67 y.o. male who presents to the ED c/o dyspnea.  Patient reports his wife woke him up this morning and told him he sounded short of breath.  Patient does admit to feeling mildly more short of breath today.  Patient also endorses increased lower extremity swelling.  Denies associated fever, cough, chest pain.  He has oxygen at home to use but states he rarely uses this.  He is on a diuretic but does admit he may have missed a few doses.  He is on Eliquis for history of paroxysmal atrial fibrillation.  No recent fall or injury.  No known sick contacts.  No other systemic complaints at this time.      External (non-ED) record review:   · Reviewed note from office visit with nephrology on 4/24/2023 where patient was seen for SYLVAIN and CKD.  Reviewed assessment and plan.  Patient with CKD 4 but no indication for dialysis at this time.  · Reviewed recent laboratory studies.  Reviewed most recent CBC with hemoglobin 7.9.  Reviewed most recent renal function panel with creatinine 3.92.      PAST MEDICAL HISTORY  Active Ambulatory Problems     Diagnosis Date Noted   • Major depressive disorder with single episode, in partial remission    • Other hyperlipidemia    • Type 2 diabetes mellitus with stage 5 chronic kidney disease not on chronic dialysis, with long-term current use of insulin    • Vitamin D deficiency 12/17/2015   • Erectile dysfunction 12/17/2015   • Chronic fatigue 04/25/2016   • 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) 04/25/2016   • Skin lesion of chest wall 06/20/2016   • Slow transit constipation 09/01/2016   • Benign prostatic hyperplasia with nocturia 12/20/2016   • History of basal cell carcinoma 12/20/2016   • High blood pressure associated with diabetes 12/20/2016   • Acquired hypothyroidism 12/20/2017   •  Hypertensive urgency 02/16/2018   • Recurrent strokes 02/20/2018   • Noncompliance w/medication treatment due to intermit use of medication 05/04/2018   • Urinary retention 09/20/2018   • Pneumonia 09/21/2018   • Acute on chronic combined systolic and diastolic congestive heart failure 09/21/2018   • Acute respiratory failure with hypoxia 09/21/2018   • Suspected sleep apnea 10/29/2018   • Pleural effusion 12/01/2018   • YAW (obstructive sleep apnea) 12/13/2018   • Coronary artery disease involving native coronary artery of native heart without angina pectoris 04/18/2019   • Chronically elevated troponin 07/02/2019   • Colon cancer screening 10/30/2018   • Enlarged lymph nodes 10/30/2018   • Functional gait abnormality 10/30/2018   • History of myocardial infarction 02/06/2019   • Hospital discharge follow-up 05/31/2019   • Insomnia 02/06/2019   • Iron deficiency anemia 10/02/2018   • Major depression, recurrent 10/16/2012   • Anemia, chronic renal failure, stage 3 (moderate) (HCC) 02/27/2020   • Essential hypertension 03/10/2020   • Adverse effect of iron and its compounds, initial encounter 05/23/2020   • Acute on chronic renal insufficiency 05/25/2020   • Debility 05/25/2020   • Falls frequently 05/25/2020   • Acute pain of right shoulder 05/27/2020   • Acute on chronic anemia 05/25/2020   • Heme positive stool 05/25/2020   • Neurogenic orthostatic hypotension 05/30/2020   • Anemia, chronic disease 05/25/2020   • History of colon polyps 05/25/2020   • Helicobacter pylori gastritis 06/04/2020   • Esophagitis 06/10/2020   • Sleep related hypoxia 07/23/2020   • Embolic stroke 05/20/2021   • Patent foramen ovale 05/22/2021   • Pleural effusion, bilateral 05/22/2021   • Cardiomyopathy (HCC) 05/24/2021   • Lower abdominal pain 12/13/2021   • Diarrhea 12/14/2021   • CKD (chronic kidney disease) stage 4, GFR 15-29 ml/min 12/16/2021   • Hypertension not at goal 02/02/2022   • Memory loss due to medical condition  02/08/2022   • Generalized weakness 03/01/2022   • ERRONEOUS ENCOUNTER--DISREGARD 03/09/2022   • Weakness 06/03/2022   • Paroxysmal atrial fibrillation 07/25/2022   • Chronic anticoagulation 07/25/2022   • Multiple falls 09/15/2022   • Dehydration 09/16/2022   • SYLVAIN (acute kidney injury) 09/16/2022   • Acute ischemic stroke 09/17/2022     Resolved Ambulatory Problems     Diagnosis Date Noted   • Anemia    • Arthritis    • Diabetes mellitus out of control    • Acute sinusitis    • Accumulation of fluid in tissues 12/17/2015   • Fatigue 12/17/2015   • Cardiomyopathy, ischemic 12/17/2015   • Acute appendicitis 03/02/2016   • Atherosclerosis of coronary artery 10/16/2012   • Altered mental status 11/30/2017   • Hypertensive encephalopathy syndrome 04/30/2018   • Hyperglycemia 05/04/2018   • Screening for colorectal cancer 08/22/2018   • Constipation 08/22/2018   • Snoring 12/13/2018   • Lower abdominal pain 05/27/2020   • Hypertensive emergency 02/01/2022   • DE LEON (dyspnea on exertion) 03/07/2023   • Shortness of breath 03/08/2023     Past Medical History:   Diagnosis Date   • Acute congestive heart failure    • CAD (coronary artery disease)    • Cancer    • Chronic combined systolic and diastolic congestive heart failure    • Chronic ischemic heart disease    • Chronic kidney disease (CKD)    • CKD (chronic kidney disease)    • COPD (chronic obstructive pulmonary disease)    • Depression    • Diabetes mellitus type 2, uncontrolled, without complications    • Diabetic neuropathy    • Disease of thyroid gland    • Elevated cholesterol    • Frequent PVCs    • GERD (gastroesophageal reflux disease)    • Heart attack    • History of coronary artery disease    • Hyperlipidemia    • Hypertension    • Hypertensive encephalopathy    • Mental status change    • NO DEVICE/RECALLED    • Poor historian    • RBBB (right bundle branch block)    • Stroke    • TIA (transient ischemic attack)    • Type 2 diabetes mellitus          PAST  SURGICAL HISTORY  Past Surgical History:   Procedure Laterality Date   • APPENDECTOMY N/A 2016    Dr. Alexey Dodson   • ARTERIOVENOUS FISTULA/SHUNT SURGERY Right 2022    Procedure: RIGHT FOREARM ARTERIOVENOUS FISTULA PLACEMENT RADIOCEPHALIC WITH CEPHALIC VEIN TRANSPOSITION;  Surgeon: Eliseo Willis MD;  Location: [facility] MAIN OR;  Service: Vascular;  Laterality: Right;   • COLONOSCOPY      over 20 years ago.  no polyps    • COLONOSCOPY N/A 2018    Procedure: COLONOSCOPY;  Surgeon: Jose Enrique Louie MD;  Location: [facility] ENDOSCOPY;  Service: Gastroenterology   • COLONOSCOPY N/A 2018    IH, EH, polyps (TAs w/low grade dysplasia)   • COLONOSCOPY N/A 2020    Procedure: COLONOSCOPY;  Surgeon: Jose Enrique Louie MD;  Location: [facility] ENDOSCOPY;  Service: Gastroenterology;  Laterality: N/A;  Pre op-Anemia, Melena, History of Polyps  Post op-To Cecum/TI, Polyp, Poor Prep   • CORONARY ARTERY BYPASS GRAFT  2001   • ENDOSCOPY N/A 2020    Procedure: ESOPHAGOGASTRODUODENOSCOPY with biopsies;  Surgeon: Amanda Lovelace MD;  Location: [facility] ENDOSCOPY;  Service: Gastroenterology;  Laterality: N/A;  pre-anemia, dark stools  post-esophagitis, hiatal hernia   • ENDOSCOPY N/A 09/15/2020    Procedure: ESOPHAGOGASTRODUODENOSCOPY WITH BIOPSIES;  Surgeon: Jose Enrique Louie MD;  Location: [facility] ENDOSCOPY;  Service: Gastroenterology;  Laterality: N/A;  pre: history of melena and esophagitis  post: mild esophagitis and gastritis, small hiatal hernia   • HERNIA REPAIR Left 2019   • THORACENTESIS     • TONSILLECTOMY     • VASECTOMY           FAMILY HISTORY  Family History   Problem Relation Age of Onset   • Diabetes Mother    • Hypertension Mother    • Macular degeneration Mother    • Alcohol abuse Father    • Cancer Father         lung,  age 65   • Heart disease Father    • Alcohol abuse Brother    • Cirrhosis Brother          age 50   • Liver cancer Brother 45   •  Diabetes Son    • Kidney failure Son    • Autism Son    • Malig Hyperthermia Neg Hx          SOCIAL HISTORY  Social History     Socioeconomic History   • Marital status:      Spouse name: Lissette   • Number of children: 3   • Years of education: college   Tobacco Use   • Smoking status: Never   • Smokeless tobacco: Never   • Tobacco comments:     CAFFEINE - OCCAS. SODA   Vaping Use   • Vaping Use: Never used   Substance and Sexual Activity   • Alcohol use: No     Comment: last drink 2 months ago   • Drug use: No   • Sexual activity: Defer         ALLERGIES  Prozac [fluoxetine hcl]        REVIEW OF SYSTEMS  Review of Systems   Constitutional: Negative for chills and fever.   HENT: Negative for ear pain and sore throat.    Respiratory: Positive for shortness of breath. Negative for cough.    Cardiovascular: Positive for leg swelling. Negative for chest pain and palpitations.   Gastrointestinal: Negative for abdominal pain and vomiting.   Genitourinary: Negative for dysuria and hematuria.   Musculoskeletal: Negative for arthralgias and joint swelling.   Skin: Negative for pallor and rash.   Neurological: Negative for syncope and headaches.   Psychiatric/Behavioral: Negative for confusion and hallucinations.            PHYSICAL EXAM  ED Triage Vitals   Temp Heart Rate Resp BP SpO2   05/02/23 0602 05/02/23 0602 05/02/23 0602 05/02/23 0602 05/02/23 0602   97.8 °F (36.6 °C) 75 24 169/87 96 %      Temp src Heart Rate Source Patient Position BP Location FiO2 (%)   -- 05/02/23 0615 -- -- --    Monitor          Physical Exam  Constitutional:       General: He is not in acute distress.     Appearance: Normal appearance.   HENT:      Head: Normocephalic and atraumatic.      Nose: Nose normal.      Mouth/Throat:      Mouth: Mucous membranes are moist.   Eyes:      Conjunctiva/sclera: Conjunctivae normal.      Pupils: Pupils are equal, round, and reactive to light.   Cardiovascular:      Rate and Rhythm: Normal rate and  regular rhythm.      Pulses: Normal pulses.           Dorsalis pedis pulses are 2+ on the right side and 2+ on the left side.      Heart sounds: Normal heart sounds.   Pulmonary:      Effort: Pulmonary effort is normal.      Breath sounds: Normal breath sounds.      Comments: Sats 94% on 2 L oxygen nasal cannula.  Diminished breath sounds bilaterally  Abdominal:      General: There is no distension.   Musculoskeletal:         General: Normal range of motion.      Cervical back: Normal range of motion and neck supple.      Right lower leg: Edema present.      Left lower leg: Edema present.   Skin:     General: Skin is warm.      Capillary Refill: Capillary refill takes less than 2 seconds.   Neurological:      General: No focal deficit present.      Mental Status: He is alert and oriented to person, place, and time.   Psychiatric:         Mood and Affect: Mood normal.         Vital signs and nursing notes reviewed.          LAB RESULTS  Recent Results (from the past 24 hour(s))   ECG 12 Lead Dyspnea    Collection Time: 05/02/23  6:39 AM   Result Value Ref Range    QT Interval 430 ms   Comprehensive Metabolic Panel    Collection Time: 05/02/23  7:20 AM    Specimen: Blood   Result Value Ref Range    Glucose 138 (H) 65 - 99 mg/dL    BUN 69 (H) 8 - 23 mg/dL    Creatinine 3.55 (H) 0.76 - 1.27 mg/dL    Sodium 145 136 - 145 mmol/L    Potassium 5.3 (H) 3.5 - 5.2 mmol/L    Chloride 114 (H) 98 - 107 mmol/L    CO2 19.9 (L) 22.0 - 29.0 mmol/L    Calcium 9.3 8.6 - 10.5 mg/dL    Total Protein 6.8 6.0 - 8.5 g/dL    Albumin 4.0 3.5 - 5.2 g/dL    ALT (SGPT) 27 1 - 41 U/L    AST (SGOT) 27 1 - 40 U/L    Alkaline Phosphatase 108 39 - 117 U/L    Total Bilirubin 0.3 0.0 - 1.2 mg/dL    Globulin 2.8 gm/dL    A/G Ratio 1.4 g/dL    BUN/Creatinine Ratio 19.4 7.0 - 25.0    Anion Gap 11.1 5.0 - 15.0 mmol/L    eGFR 18.0 (L) >60.0 mL/min/1.73   Protime-INR    Collection Time: 05/02/23  7:20 AM    Specimen: Blood   Result Value Ref Range    Protime  15.6 (H) 11.7 - 14.2 Seconds    INR 1.23 (H) 0.90 - 1.10   aPTT    Collection Time: 05/02/23  7:20 AM    Specimen: Blood   Result Value Ref Range    PTT 28.7 22.7 - 35.4 seconds   BNP    Collection Time: 05/02/23  7:20 AM    Specimen: Blood   Result Value Ref Range    proBNP 8,501.0 (H) 0.0 - 900.0 pg/mL   High Sensitivity Troponin T    Collection Time: 05/02/23  7:20 AM    Specimen: Blood   Result Value Ref Range    HS Troponin T 98 (C) <15 ng/L   Procalcitonin    Collection Time: 05/02/23  7:20 AM    Specimen: Blood   Result Value Ref Range    Procalcitonin 0.10 0.00 - 0.25 ng/mL   CBC Auto Differential    Collection Time: 05/02/23  7:20 AM    Specimen: Blood   Result Value Ref Range    WBC 6.28 3.40 - 10.80 10*3/mm3    RBC 3.25 (L) 4.14 - 5.80 10*6/mm3    Hemoglobin 8.8 (L) 13.0 - 17.7 g/dL    Hematocrit 27.8 (L) 37.5 - 51.0 %    MCV 85.5 79.0 - 97.0 fL    MCH 27.1 26.6 - 33.0 pg    MCHC 31.7 31.5 - 35.7 g/dL    RDW 14.1 12.3 - 15.4 %    RDW-SD 43.4 37.0 - 54.0 fl    MPV 10.1 6.0 - 12.0 fL    Platelets 248 140 - 450 10*3/mm3    Neutrophil % 75.5 42.7 - 76.0 %    Lymphocyte % 11.9 (L) 19.6 - 45.3 %    Monocyte % 8.6 5.0 - 12.0 %    Eosinophil % 3.3 0.3 - 6.2 %    Basophil % 0.2 0.0 - 1.5 %    Immature Grans % 0.5 0.0 - 0.5 %    Neutrophils, Absolute 4.74 1.70 - 7.00 10*3/mm3    Lymphocytes, Absolute 0.75 0.70 - 3.10 10*3/mm3    Monocytes, Absolute 0.54 0.10 - 0.90 10*3/mm3    Eosinophils, Absolute 0.21 0.00 - 0.40 10*3/mm3    Basophils, Absolute 0.01 0.00 - 0.20 10*3/mm3    Immature Grans, Absolute 0.03 0.00 - 0.05 10*3/mm3    nRBC 0.0 0.0 - 0.2 /100 WBC       Ordered the above labs and reviewed the results.        RADIOLOGY  XR Chest 1 View    Result Date: 5/2/2023  XR CHEST 1 VW-  HISTORY:  Dyspnea, COPD.  COMPARISON:  Chest radiograph 03/08/2023  FINDINGS:  A single view of the chest was obtained. The cardiac silhouette is partially obscured. There are postsurgical changes from CABG. There is calcific aortic  atherosclerosis. There is central pulmonary venous congestion with relatively diffuse patchy airspace and interstitial opacities, right greater than left, as well as more dense opacity at the lung bases, also right greater than left. There is a moderate right pleural effusion. Findings are concerning for pulmonary edema versus multifocal pneumonia in the appropriate clinical setting, new from 03/8/2023, likely also with a component of atelectasis. Short-term follow-up chest radiographs are recommended to document resolution. Median sternotomy wires are present.  This report was finalized on 5/2/2023 7:24 AM by Dr. Katrin Cui M.D.        Ordered the above noted radiological studies. Reviewed by me in PACS.                MEDICATIONS GIVEN IN ER  Medications   nitroglycerin (NITROSTAT) ointment 1 inch (1 inch Topical Given 5/2/23 0832)   furosemide (LASIX) injection 80 mg (80 mg Intravenous Given 5/2/23 0831)                   MEDICAL DECISION MAKING, PROGRESS, and CONSULTS    All labs have been independently reviewed by me.  All radiology studies have been reviewed by me and I have also reviewed the radiology report.   EKG's independently viewed and interpreted by me.  Discussion below represents my analysis of pertinent findings related to patient's condition, differential diagnosis, treatment plan and final disposition.      Additional sources:    - Chronic or social conditions impacting care: CKD        Orders placed during this visit:  Orders Placed This Encounter   Procedures   • Respiratory Panel PCR w/COVID-19(SARS-CoV-2) [facility]/TERRELL/RAISSA/[facility] In-House, NP Swab in UTM/VTM, 3-4 HR TAT - Swab, Nasopharynx   • XR Chest 1 View   • Comprehensive Metabolic Panel   • Protime-INR   • aPTT   • BNP   • High Sensitivity Troponin T   • Procalcitonin   • CBC Auto Differential   • High Sensitivity Troponin T 2Hr   • LHA (on-call MD unless specified) Details   • ECG 12 Lead Dyspnea   • CBC & Differential          Additional orders considered but not ordered:  CTA of the chest    Differential diagnosis:  CHF exacerbation, SYLVAIN, pneumonia      Independent interpretation of labs, radiology studies, and discussions with consultants:  ED Course as of 05/02/23 0901   Tue May 02, 2023   0732 EKG          EKG time: 639  Rhythm/Rate: Sinus rhythm, 74  P waves and HI: First-degree AV block  QRS, axis: RBBB  ST and T waves: No acute ischemic changes    Interpreted Contemporaneously by me, independently viewed  Similar compared to prior 3/7/2023       [JR]   0821 proBNP(!): 8,501.0 [JR]   0821 HS Troponin T(!!): 98 [JR]   0821 Creatinine(!): 3.55 [JR]   0847 Prior echo reviewed from 3/7/2023 that showed EF 41 to 45%, grade 3 diastolic dysfunction. [JR]   0901 Plan to admit for CHF exacerbation [MP]      ED Course User Index  [JR] Wilner Gillis MD  [MP] Reina Melendrez PA-C             Patient was wearing a face mask when I entered the room and they continued to wear a mask throughout their stay in the ED.  I wore PPE, including  gloves, face mask with shield or face mask with goggles whenever I was in the room with patient.     DIAGNOSIS  Final diagnoses:   Acute combined systolic and diastolic congestive heart failure   Chronic kidney disease, unspecified CKD stage   Hypoxia   Elevated troponin level not due myocardial infarction           Latest Documented Vital Signs:  As of 09:01 EDT  BP- (!) 167/101 HR- 83 Temp- 97.8 °F (36.6 °C) O2 sat- 92%              --    Please note that portions of this were completed with a voice recognition program.       Note Disclaimer: At [facility], we believe that sharing information builds trust and better relationships. You are receiving this note because you are receiving care at [facility] or recently visited. It is possible you will see health information before a provider has talked with you about it. This kind of information can be easy to misunderstand. To help you  fully understand what it means for your health, we urge you to discuss this note with your provider.           Reina Melendrez PA-C  05/02/23 0902

## 2023-05-02 NOTE — CONSULTS
[facility] Consult Note      Patient Name: Carlito Mo  : 1955  MRN: [id number]  Primary Care Physician:  Florencia Caballero MD  Referring Physician: Smith Henson MD  Date of admission: 2023    Subjective     Reason for Consult:   SYLVAIN     HPI:   Carlito Mo is a 67 y.o. male known to have history of coronary disease status post CABG, congestive heart failure, type 2 diabetes mellitus, hypertension, and chronic kidney disease stage IV who follows Dr. Bryan Huddleston.  Chronic kidney disease secondary to diabetic and hypertensive nephrosclerosis.  Patient also has a history of CVA on Eliquis, recurrent pleural effusion status postthoracentesis and BPH who presented to the hospital with worsening shortness of breath associated with lower extremity edema.  In ER was noted to have a creatinine of 3.55 improved compared to 4 on 3/9/2023.  Denies the patient had recent admission in March to the hospital for CHF exacerbation at the time he was diuresed his creatinine went up from around  2.9 mg/dL to 4 milligram per deciliter.  Chest x-ray performed was in favor of pulmonary edema with moderate right pleural effusion multifocal infiltrates, proBNP of 8500, sodium 145, potassium 5.3, bicarb 19.   Patient noted he may skipping his diuretics at a time.  Patient was given 1 dose of Lasix 80 mg IV once and was started on 40 mg of Lasix IV twice daily.  Review of Systems:   14 point review of systems is otherwise negative except for mentioned above on HPI    Personal History     Past Medical History:   Diagnosis Date   • Acquired hypothyroidism    • Acute congestive heart failure    • Acute respiratory failure with hypoxia    • Acute sinusitis    • SYLVAIN (acute kidney injury)     on CKD   • Anemia    • Arthritis    • CAD (coronary artery disease)    • Cancer     skin   • Chronic combined systolic and diastolic congestive heart failure    • Chronic ischemic heart disease    • Chronic kidney  disease (CKD)    • CKD (chronic kidney disease)    • COPD (chronic obstructive pulmonary disease)    • Depression    • Diabetes mellitus type 2, uncontrolled, without complications    • Diabetic neuropathy    • Disease of thyroid gland    • Elevated cholesterol    • Fatigue    • Frequent PVCs    • Generalized weakness    • GERD (gastroesophageal reflux disease)    • Heart attack    • History of coronary artery disease     with remote history of bypass surgery in 2001   • Hyperlipidemia    • Hypertension    • Hypertensive encephalopathy    • Mental status change     Acute   • NO DEVICE/RECALLED    • Pleural effusion    • Pneumonia    • Poor historian    • RBBB (right bundle branch block)    • Stroke     POOR VISION   • TIA (transient ischemic attack)    • Type 2 diabetes mellitus    • Urinary retention    • Vitamin D deficiency        Past Surgical History:   Procedure Laterality Date   • APPENDECTOMY N/A 02/14/2016    Dr. Alexey Dodson   • ARTERIOVENOUS FISTULA/SHUNT SURGERY Right 06/03/2022    Procedure: RIGHT FOREARM ARTERIOVENOUS FISTULA PLACEMENT RADIOCEPHALIC WITH CEPHALIC VEIN TRANSPOSITION;  Surgeon: Eliseo Willis MD;  Location: [facility] MAIN OR;  Service: Vascular;  Laterality: Right;   • COLONOSCOPY      over 20 years ago.  no polyps    • COLONOSCOPY N/A 09/18/2018    Procedure: COLONOSCOPY;  Surgeon: Jose Enrique Louie MD;  Location: [facility] ENDOSCOPY;  Service: Gastroenterology   • COLONOSCOPY N/A 09/19/2018    IH, EH, polyps (TAs w/low grade dysplasia)   • COLONOSCOPY N/A 06/01/2020    Procedure: COLONOSCOPY;  Surgeon: Jose Enrique Louie MD;  Location: [facility] ENDOSCOPY;  Service: Gastroenterology;  Laterality: N/A;  Pre op-Anemia, Melena, History of Polyps  Post op-To Cecum/TI, Polyp, Poor Prep   • CORONARY ARTERY BYPASS GRAFT  11/2001   • ENDOSCOPY N/A 05/29/2020    Procedure: ESOPHAGOGASTRODUODENOSCOPY with biopsies;  Surgeon: Amanda Lovelace MD;  Location: [facility] ENDOSCOPY;  Service:  Gastroenterology;  Laterality: N/A;  pre-anemia, dark stools  post-esophagitis, hiatal hernia   • ENDOSCOPY N/A 09/15/2020    Procedure: ESOPHAGOGASTRODUODENOSCOPY WITH BIOPSIES;  Surgeon: Jose Enrique Louie MD;  Location: [facility] ENDOSCOPY;  Service: Gastroenterology;  Laterality: N/A;  pre: history of melena and esophagitis  post: mild esophagitis and gastritis, small hiatal hernia   • HERNIA REPAIR Left 12/05/2019   • THORACENTESIS     • TONSILLECTOMY     • VASECTOMY         Family History: family history includes Alcohol abuse in his brother and father; Autism in his son; Cancer in his father; Cirrhosis in his brother; Diabetes in his mother and son; Heart disease in his father; Hypertension in his mother; Kidney failure in his son; Liver cancer (age of onset: 45) in his brother; Macular degeneration in his mother.    Social History:  reports that he has never smoked. He has never used smokeless tobacco. He reports that he does not drink alcohol and does not use drugs.    Home Medications:  Prior to Admission medications    Medication Sig Start Date End Date Taking? Authorizing Provider   acetaminophen (TYLENOL) 325 MG tablet Take 2 tablets by mouth Every 4 (Four) Hours As Needed for Mild Pain. 9/19/22  Yes Asim Hogue MD   amLODIPine (NORVASC) 5 MG tablet Take 1 tablet by mouth Daily.   Yes ProviderAlonzo MD   apixaban (ELIQUIS) 5 MG tablet tablet Take 1 tablet by mouth Every 12 (Twelve) Hours. Indications: Atrial Fibrillation 9/19/22  Yes Asim Hogue MD   aspirin 81 MG EC tablet Take 1 tablet by mouth Daily. 3/28/23  Yes Nida Lopes APRN   buPROPion XL (WELLBUTRIN XL) 150 MG 24 hr tablet Take 1 tablet by mouth Daily.   Yes Alonzo Dean MD   carvedilol (COREG) 3.125 MG tablet Take 1 tablet by mouth 2 (Two) Times a Day With Meals.   Yes Alonzo Dean MD   famotidine (PEPCID) 10 MG tablet Take 1 tablet by mouth 2 (Two) Times a Day As Needed for Heartburn.   Yes  Alonzo Dean MD   furosemide (LASIX) 20 MG tablet Take 10 mg by mouth Daily. Take one half tablet daily   Yes Alonzo Dean MD   hydrALAZINE (APRESOLINE) 25 MG tablet Take 1 tablet by mouth 3 (Three) Times a Day.   Yes Alonzo Dean MD   Insulin Glargine w/ Trans Port (Basaglar Tempo Pen) 100 UNIT/ML solution pen-injector Inject 2 Units under the skin into the appropriate area as directed Every Night.   Yes Alonzo Dean MD   insulin glulisine (Apidra) 100 UNIT/ML injection Inject 3 Units under the skin into the appropriate area as directed 3 (Three) Times a Day Before Meals.   Yes Alonzo Dean MD   ipratropium-albuterol (COMBIVENT RESPIMAT)  MCG/ACT inhaler Inhale 1 puff 4 (Four) Times a Day As Needed for Wheezing.   Yes Alonzo Dean MD   levothyroxine (SYNTHROID, LEVOTHROID) 75 MCG tablet Take 1 tablet by mouth Daily. 2/8/22  Yes Erica Welsh MD   rosuvastatin (CRESTOR) 40 MG tablet Take 1 tablet by mouth Daily.   Yes Alonzo Dean MD   tamsulosin (FLOMAX) 0.4 MG capsule 24 hr capsule Take 1 capsule by mouth Daily.   Yes Alonzo Dean MD   pantoprazole (PROTONIX) 20 MG EC tablet Take 1 tablet by mouth Daily.  5/2/23  Alonzo Dean MD       Allergies:  Allergies   Allergen Reactions   • Prozac [Fluoxetine Hcl] Other (See Comments)     shaking       Objective     Vitals:   Temp:  [97.8 °F (36.6 °C)] 97.8 °F (36.6 °C)  Heart Rate:  [75-89] 86  Resp:  [20-24] 20  BP: (167-178)/() 173/98  Flow (L/min):  [2] 2    Intake/Output Summary (Last 24 hours) at 5/2/2023 1732  Last data filed at 5/2/2023 1644  Gross per 24 hour   Intake 466 ml   Output 2250 ml   Net -1784 ml       Physical Exam:   Constitutional: Awake, alert, no acute distress.  HEENT: Sclera anicteric, no conjunctival injection  Neck: Supple, no thyromegaly, no lymphadenopathy, trachea at midline, no JVD  Respiratory: Clear to auscultation bilaterally, nonlabored  respiration  Cardiovascular: RRR, no murmurs, no rubs or gallops, no carotid bruit  Gastrointestinal: Positive bowel sounds, abdomen is soft, nontender and nondistended  : No palpable bladder  Musculoskeletal: No edema, no clubbing or cyanosis  Psychiatric: Appropriate affect, cooperative  Neurologic: Oriented x3, moving all extremities, normal speech and mental status  Skin: Warm and dry       Scheduled Meds:     amLODIPine, 5 mg, Oral, Daily  apixaban, 5 mg, Oral, Q12H  aspirin, 81 mg, Oral, Daily  budesonide-formoterol, 2 puff, Inhalation, BID - RT  buPROPion XL, 150 mg, Oral, Daily  carvedilol, 6.25 mg, Oral, BID With Meals  furosemide, 40 mg, Intravenous, Q12H  hydrALAZINE, 25 mg, Oral, TID  insulin glargine, 1-200 Units, Subcutaneous, Nightly - Glucommander  insulin lispro, 1-200 Units, Subcutaneous, 4x Daily With Meals & Nightly  [START ON 5/3/2023] levothyroxine, 75 mcg, Oral, Q AM  rosuvastatin, 10 mg, Oral, Daily  sodium bicarbonate, 650 mg, Oral, TID  tamsulosin, 0.4 mg, Oral, Daily  tiotropium bromide monohydrate, 2 puff, Inhalation, Daily - RT      IV Meds:        Results Reviewed:   I have personally reviewed the results from the time of this admission to 5/2/2023 17:32 EDT     Lab Results   Component Value Date    GLUCOSE 138 (H) 05/02/2023    CALCIUM 9.3 05/02/2023     05/02/2023    K 5.3 (H) 05/02/2023    CO2 19.9 (L) 05/02/2023     (H) 05/02/2023    BUN 69 (H) 05/02/2023    CREATININE 3.55 (H) 05/02/2023    EGFRIFAFRI 74 12/12/2017    EGFRIFNONA 19 (L) 02/04/2022    BCR 19.4 05/02/2023    ANIONGAP 11.1 05/02/2023      Lab Results   Component Value Date    MG 2.1 12/01/2022    PHOS 4.5 03/10/2023    ALBUMIN 4.0 05/02/2023           Assessment / Plan     ASSESSMENT:  1. Acute kidney injury on top of chronic kidney disease stage IV secondary to diabetic and hypertensive nephrosclerosis followed by Dr. Bryan Huddleston from our group.  Creatinine was up to 4 in March now down to 3.55.  Patient  is currently hypervolemic with acidosis and hyperkalemia.  2. Hypertension with CKD blood pressure is uncontrolled likely secondary to hypervolemia.  3. Acute CHF exacerbation  4. History of systolic and diastolic heart failure with EF of 45% and grade 2 diastolic failure.  5. Type 2 diabetes mellitus with CKD  6. Mild hyperkalemia  7. Volume overload  8. Right pleural effusion status postthoracentesis        PLAN:  · Patient does seems to be hypervolemic.  We will switch diuretics to Bumex 2 mg IV twice daily for 3 doses and reassess tomorrow morning.  · Salt restricted diet to less than 2 g per 24 hours.  · Fluid restriction of 1200 cc per 24 hours.  · I agree with hydralazine  · Surveillance labs      Thank you for involving us in the care of Carlito Mo.  Please feel free to call with any questions.    Ayad Bangura MD  05/02/23  17:32 EDT    [facility]  [phone]    Parts of this note may be an electronic transcription/translation of spoken language to printed text using the Dragon dictation system.

## 2023-05-03 ENCOUNTER — APPOINTMENT (OUTPATIENT)
Dept: GENERAL RADIOLOGY | Facility: HOSPITAL | Age: 68
End: 2023-05-03
Payer: MEDICARE

## 2023-05-03 LAB
ALBUMIN SERPL-MCNC: 3.5 G/DL (ref 3.5–5.2)
ANION GAP SERPL CALCULATED.3IONS-SCNC: 10 MMOL/L (ref 5–15)
BASOPHILS # BLD AUTO: 0.01 10*3/MM3 (ref 0–0.2)
BASOPHILS NFR BLD AUTO: 0.2 % (ref 0–1.5)
BUN SERPL-MCNC: 71 MG/DL (ref 8–23)
BUN/CREAT SERPL: 20.7 (ref 7–25)
CALCIUM SPEC-SCNC: 8.4 MG/DL (ref 8.6–10.5)
CHLORIDE SERPL-SCNC: 108 MMOL/L (ref 98–107)
CO2 SERPL-SCNC: 21 MMOL/L (ref 22–29)
CREAT SERPL-MCNC: 3.43 MG/DL (ref 0.76–1.27)
DEPRECATED RDW RBC AUTO: 42.2 FL (ref 37–54)
EGFRCR SERPLBLD CKD-EPI 2021: 18.8 ML/MIN/1.73
EOSINOPHIL # BLD AUTO: 0.22 10*3/MM3 (ref 0–0.4)
EOSINOPHIL NFR BLD AUTO: 3.6 % (ref 0.3–6.2)
ERYTHROCYTE [DISTWIDTH] IN BLOOD BY AUTOMATED COUNT: 13.9 % (ref 12.3–15.4)
GLUCOSE BLDC GLUCOMTR-MCNC: 122 MG/DL (ref 70–130)
GLUCOSE BLDC GLUCOMTR-MCNC: 126 MG/DL (ref 70–130)
GLUCOSE BLDC GLUCOMTR-MCNC: 137 MG/DL (ref 70–130)
GLUCOSE BLDC GLUCOMTR-MCNC: 151 MG/DL (ref 70–130)
GLUCOSE BLDC GLUCOMTR-MCNC: 52 MG/DL (ref 70–130)
GLUCOSE BLDC GLUCOMTR-MCNC: 55 MG/DL (ref 70–130)
GLUCOSE BLDC GLUCOMTR-MCNC: 64 MG/DL (ref 70–130)
GLUCOSE BLDC GLUCOMTR-MCNC: 69 MG/DL (ref 70–130)
GLUCOSE BLDC GLUCOMTR-MCNC: 73 MG/DL (ref 70–130)
GLUCOSE SERPL-MCNC: 49 MG/DL (ref 65–99)
HBA1C MFR BLD: 6.6 % (ref 4.8–5.6)
HCT VFR BLD AUTO: 24.4 % (ref 37.5–51)
HGB BLD-MCNC: 7.8 G/DL (ref 13–17.7)
IMM GRANULOCYTES # BLD AUTO: 0.03 10*3/MM3 (ref 0–0.05)
IMM GRANULOCYTES NFR BLD AUTO: 0.5 % (ref 0–0.5)
LYMPHOCYTES # BLD AUTO: 1.03 10*3/MM3 (ref 0.7–3.1)
LYMPHOCYTES NFR BLD AUTO: 16.9 % (ref 19.6–45.3)
MCH RBC QN AUTO: 26.6 PG (ref 26.6–33)
MCHC RBC AUTO-ENTMCNC: 32 G/DL (ref 31.5–35.7)
MCV RBC AUTO: 83.3 FL (ref 79–97)
MONOCYTES # BLD AUTO: 0.74 10*3/MM3 (ref 0.1–0.9)
MONOCYTES NFR BLD AUTO: 12.2 % (ref 5–12)
NEUTROPHILS NFR BLD AUTO: 4.06 10*3/MM3 (ref 1.7–7)
NEUTROPHILS NFR BLD AUTO: 66.6 % (ref 42.7–76)
NRBC BLD AUTO-RTO: 0 /100 WBC (ref 0–0.2)
PHOSPHATE SERPL-MCNC: 4.4 MG/DL (ref 2.5–4.5)
PLATELET # BLD AUTO: 280 10*3/MM3 (ref 140–450)
PMV BLD AUTO: 10 FL (ref 6–12)
POTASSIUM SERPL-SCNC: 4.9 MMOL/L (ref 3.5–5.2)
RBC # BLD AUTO: 2.93 10*6/MM3 (ref 4.14–5.8)
SODIUM SERPL-SCNC: 139 MMOL/L (ref 136–145)
WBC NRBC COR # BLD: 6.09 10*3/MM3 (ref 3.4–10.8)

## 2023-05-03 PROCEDURE — 94799 UNLISTED PULMONARY SVC/PX: CPT

## 2023-05-03 PROCEDURE — 94761 N-INVAS EAR/PLS OXIMETRY MLT: CPT

## 2023-05-03 PROCEDURE — 85025 COMPLETE CBC W/AUTO DIFF WBC: CPT | Performed by: INTERNAL MEDICINE

## 2023-05-03 PROCEDURE — 80069 RENAL FUNCTION PANEL: CPT | Performed by: HOSPITALIST

## 2023-05-03 PROCEDURE — G0378 HOSPITAL OBSERVATION PER HR: HCPCS

## 2023-05-03 PROCEDURE — 99222 1ST HOSP IP/OBS MODERATE 55: CPT | Performed by: NURSE PRACTITIONER

## 2023-05-03 PROCEDURE — 94664 DEMO&/EVAL PT USE INHALER: CPT

## 2023-05-03 PROCEDURE — 96375 TX/PRO/DX INJ NEW DRUG ADDON: CPT

## 2023-05-03 PROCEDURE — 71046 X-RAY EXAM CHEST 2 VIEWS: CPT

## 2023-05-03 PROCEDURE — 83036 HEMOGLOBIN GLYCOSYLATED A1C: CPT | Performed by: INTERNAL MEDICINE

## 2023-05-03 PROCEDURE — 82948 REAGENT STRIP/BLOOD GLUCOSE: CPT

## 2023-05-03 PROCEDURE — 63710000001 INSULIN LISPRO (HUMAN) PER 5 UNITS: Performed by: INTERNAL MEDICINE

## 2023-05-03 RX ORDER — IBUPROFEN 600 MG/1
1 TABLET ORAL
Status: DISCONTINUED | OUTPATIENT
Start: 2023-05-03 | End: 2023-05-05 | Stop reason: HOSPADM

## 2023-05-03 RX ORDER — DEXTROSE MONOHYDRATE 25 G/50ML
10-50 INJECTION, SOLUTION INTRAVENOUS
Status: DISCONTINUED | OUTPATIENT
Start: 2023-05-03 | End: 2023-05-05 | Stop reason: HOSPADM

## 2023-05-03 RX ORDER — NICOTINE POLACRILEX 4 MG
15 LOZENGE BUCCAL
Status: DISCONTINUED | OUTPATIENT
Start: 2023-05-03 | End: 2023-05-05 | Stop reason: HOSPADM

## 2023-05-03 RX ORDER — INSULIN LISPRO 100 [IU]/ML
1-200 INJECTION, SOLUTION INTRAVENOUS; SUBCUTANEOUS
Status: DISCONTINUED | OUTPATIENT
Start: 2023-05-03 | End: 2023-05-05 | Stop reason: HOSPADM

## 2023-05-03 RX ORDER — INSULIN LISPRO 100 [IU]/ML
1-200 INJECTION, SOLUTION INTRAVENOUS; SUBCUTANEOUS AS NEEDED
Status: DISCONTINUED | OUTPATIENT
Start: 2023-05-03 | End: 2023-05-05 | Stop reason: HOSPADM

## 2023-05-03 RX ADMIN — INSULIN GLARGINE-YFGN 3 UNITS: 100 INJECTION, SOLUTION SUBCUTANEOUS at 21:38

## 2023-05-03 RX ADMIN — BUDESONIDE AND FORMOTEROL FUMARATE DIHYDRATE 2 PUFF: 160; 4.5 AEROSOL RESPIRATORY (INHALATION) at 20:29

## 2023-05-03 RX ADMIN — APIXABAN 5 MG: 5 TABLET, FILM COATED ORAL at 20:36

## 2023-05-03 RX ADMIN — HYDRALAZINE HYDROCHLORIDE 25 MG: 25 TABLET, FILM COATED ORAL at 17:10

## 2023-05-03 RX ADMIN — BUDESONIDE AND FORMOTEROL FUMARATE DIHYDRATE 2 PUFF: 160; 4.5 AEROSOL RESPIRATORY (INHALATION) at 06:53

## 2023-05-03 RX ADMIN — SODIUM BICARBONATE 650 MG: 650 TABLET ORAL at 20:36

## 2023-05-03 RX ADMIN — CARVEDILOL 6.25 MG: 6.25 TABLET, FILM COATED ORAL at 09:28

## 2023-05-03 RX ADMIN — HYDRALAZINE HYDROCHLORIDE 25 MG: 25 TABLET, FILM COATED ORAL at 09:28

## 2023-05-03 RX ADMIN — INSULIN LISPRO 1 UNITS: 100 INJECTION, SOLUTION INTRAVENOUS; SUBCUTANEOUS at 17:10

## 2023-05-03 RX ADMIN — LEVOTHYROXINE SODIUM 75 MCG: 0.07 TABLET ORAL at 06:25

## 2023-05-03 RX ADMIN — BUMETANIDE 2 MG: 0.25 INJECTION INTRAMUSCULAR; INTRAVENOUS at 09:30

## 2023-05-03 RX ADMIN — APIXABAN 5 MG: 5 TABLET, FILM COATED ORAL at 09:28

## 2023-05-03 RX ADMIN — INSULIN LISPRO 1 UNITS: 100 INJECTION, SOLUTION INTRAVENOUS; SUBCUTANEOUS at 09:28

## 2023-05-03 RX ADMIN — TAMSULOSIN HYDROCHLORIDE 0.4 MG: 0.4 CAPSULE ORAL at 09:28

## 2023-05-03 RX ADMIN — ASPIRIN 81 MG: 81 TABLET, COATED ORAL at 09:28

## 2023-05-03 RX ADMIN — AMLODIPINE BESYLATE 5 MG: 5 TABLET ORAL at 09:28

## 2023-05-03 RX ADMIN — TIOTROPIUM BROMIDE INHALATION SPRAY 2 PUFF: 3.12 SPRAY, METERED RESPIRATORY (INHALATION) at 06:54

## 2023-05-03 RX ADMIN — HYDRALAZINE HYDROCHLORIDE 25 MG: 25 TABLET, FILM COATED ORAL at 20:36

## 2023-05-03 RX ADMIN — ACETAMINOPHEN 650 MG: 325 TABLET, FILM COATED ORAL at 04:30

## 2023-05-03 RX ADMIN — CARVEDILOL 6.25 MG: 6.25 TABLET, FILM COATED ORAL at 17:10

## 2023-05-03 RX ADMIN — BUPROPION HYDROCHLORIDE 150 MG: 150 TABLET, EXTENDED RELEASE ORAL at 09:28

## 2023-05-03 RX ADMIN — SODIUM BICARBONATE 650 MG: 650 TABLET ORAL at 09:28

## 2023-05-03 RX ADMIN — ROSUVASTATIN CALCIUM 10 MG: 10 TABLET, FILM COATED ORAL at 09:28

## 2023-05-03 RX ADMIN — SODIUM BICARBONATE 650 MG: 650 TABLET ORAL at 17:10

## 2023-05-03 NOTE — PLAN OF CARE
Goal Outcome Evaluation:     Problem: Adult Inpatient Plan of Care  Goal: Absence of Hospital-Acquired Illness or Injury  Intervention: Identify and Manage Fall Risk  Recent Flowsheet Documentation  Taken 5/3/2023 1423 by Maya Wright RN  Safety Promotion/Fall Prevention: activity supervised  Taken 5/3/2023 1245 by Maya Wright RN  Safety Promotion/Fall Prevention: activity supervised  Taken 5/3/2023 1048 by Maya Wright RN  Safety Promotion/Fall Prevention: activity supervised  Taken 5/3/2023 0944 by Maya Wright RN  Safety Promotion/Fall Prevention: activity supervised  Taken 5/3/2023 0803 by Maya Wright RN  Safety Promotion/Fall Prevention: activity supervised     Problem: Adult Inpatient Plan of Care  Goal: Absence of Hospital-Acquired Illness or Injury  Outcome: Ongoing, Progressing  Intervention: Identify and Manage Fall Risk  Recent Flowsheet Documentation  Taken 5/3/2023 1423 by Maya Wright RN  Safety Promotion/Fall Prevention: activity supervised  Taken 5/3/2023 1245 by Maya Wright RN  Safety Promotion/Fall Prevention: activity supervised  Taken 5/3/2023 1048 by Maya Wright RN  Safety Promotion/Fall Prevention: activity supervised  Taken 5/3/2023 0944 by Maya Wright RN  Safety Promotion/Fall Prevention: activity supervised  Taken 5/3/2023 0803 by Maya Wright RN  Safety Promotion/Fall Prevention: activity supervised  Intervention: Prevent Skin Injury  Recent Flowsheet Documentation  Taken 5/3/2023 1423 by Maya Wright RN  Body Position: weight shifting  Taken 5/3/2023 1245 by Maya Wright RN  Body Position: weight shifting  Taken 5/3/2023 1048 by Maya Wright RN  Body Position:   weight shifting   side-lying   right  Taken 5/3/2023 0944 by Maya Wright RN  Body Position: sitting up in bed  Skin Protection: adhesive use limited  Taken 5/3/2023 0803 by Maya Wright RN  Body Position:   weight shifting   side-lying    right  Intervention: Prevent and Manage VTE (Venous Thromboembolism) Risk  Recent Flowsheet Documentation  Taken 5/3/2023 1423 by Maya Wright RN  Activity Management: activity encouraged  VTE Prevention/Management:   bilateral   sequential compression devices off  Taken 5/3/2023 1245 by Maya Wright RN  Activity Management: activity encouraged  Taken 5/3/2023 1048 by Maya Wright RN  Activity Management: back to bed  Taken 5/3/2023 0944 by Maya Wright RN  Activity Management: activity encouraged  VTE Prevention/Management:   bilateral   sequential compression devices off  Taken 5/3/2023 0803 by Maya Wright RN  Activity Management:   activity minimized   activity encouraged  Intervention: Prevent Infection  Recent Flowsheet Documentation  Taken 5/3/2023 1423 by Maya Wright RN  Infection Prevention:   cohorting utilized   hand hygiene promoted  Taken 5/3/2023 1245 by Maya Wright RN  Infection Prevention:   cohorting utilized   hand hygiene promoted  Taken 5/3/2023 1048 by Maya Wright RN  Infection Prevention:   cohorting utilized   hand hygiene promoted  Taken 5/3/2023 0944 by Maya Wright RN  Infection Prevention:   cohorting utilized   hand hygiene promoted  Taken 5/3/2023 0803 by Maya Wright RN  Infection Prevention:   cohorting utilized   equipment surfaces disinfected      Able to answer all of orientation questions. On 2L. Low blood sugar today for lunch. Cxray completed. Noncompliant with medical recommendation at home. Care plan carefully updated.

## 2023-05-03 NOTE — PROGRESS NOTES
[facility] Progress Note      Patient Name: Carlito Mo  : 1955  MRN: [id number]  Primary Care Physician:  Florencia Caballero MD  Date of admission: 2023    Subjective     Interval History:   The patient was seen and examined today for follow-up on  SYLVAIN       Review of Systems:   Breathing better today.  Urine output of 3.2 L    Objective     Vitals:   Temp:  [97.6 °F (36.4 °C)-97.9 °F (36.6 °C)] 97.8 °F (36.6 °C)  Heart Rate:  [59-69] 59  Resp:  [16-20] 18  BP: (123-136)/(67-77) 123/71  Flow (L/min):  [2-4] 2    Intake/Output Summary (Last 24 hours) at 5/3/2023 1611  Last data filed at 5/3/2023 1303  Gross per 24 hour   Intake 1249 ml   Output 2005 ml   Net -756 ml       Physical Exam:    General Appearance: Mildly confused today  Skin: warm and dry  HEENT: oral mucosa normal, nonicteric sclera  Neck: supple, no JVD  Lungs: CTA  Heart: RRR, normal S1 and S2  Abdomen: soft, nontender, nondistended  : no palpable bladder  Extremities: 1+ bilateral lower extremity edema, cyanosis or clubbing  Neuro: normal speech and mental status     Scheduled Meds:     amLODIPine, 5 mg, Oral, Daily  apixaban, 5 mg, Oral, Q12H  aspirin, 81 mg, Oral, Daily  budesonide-formoterol, 2 puff, Inhalation, BID - RT  bumetanide, 2 mg, Intravenous, Q12H  buPROPion XL, 150 mg, Oral, Daily  carvedilol, 6.25 mg, Oral, BID With Meals  hydrALAZINE, 25 mg, Oral, TID  insulin glargine, 1-200 Units, Subcutaneous, Nightly - Glucommander  insulin lispro, 1-200 Units, Subcutaneous, 4x Daily With Meals & Nightly  levothyroxine, 75 mcg, Oral, Q AM  rosuvastatin, 10 mg, Oral, Daily  sodium bicarbonate, 650 mg, Oral, TID  tamsulosin, 0.4 mg, Oral, Daily  tiotropium bromide monohydrate, 2 puff, Inhalation, Daily - RT      IV Meds:        Results Reviewed:   I have personally reviewed the results from the time of this admission to 5/3/2023 16:11 EDT     Results from last 7 days   Lab Units 23  0330  05/02/23  0720   SODIUM mmol/L 139 145   POTASSIUM mmol/L 4.9 5.3*   CHLORIDE mmol/L 108* 114*   CO2 mmol/L 21.0* 19.9*   BUN mg/dL 71* 69*   CREATININE mg/dL 3.43* 3.55*   CALCIUM mg/dL 8.4* 9.3   BILIRUBIN mg/dL  --  0.3   ALK PHOS U/L  --  108   ALT (SGPT) U/L  --  27   AST (SGOT) U/L  --  27   GLUCOSE mg/dL 49* 138*       Estimated Creatinine Clearance: 23 mL/min (A) (by C-G formula based on SCr of 3.43 mg/dL (H)).    Results from last 7 days   Lab Units 05/03/23  0333   PHOSPHORUS mg/dL 4.4             Results from last 7 days   Lab Units 05/03/23  0333 05/02/23  0720   WBC 10*3/mm3 6.09 6.28   HEMOGLOBIN g/dL 7.8* 8.8*   PLATELETS 10*3/mm3 280 248       Results from last 7 days   Lab Units 05/02/23  0720   INR  1.23*       Assessment / Plan     ASSESSMENT:  1. Acute kidney injury on top of chronic kidney disease stage IV secondary to diabetic and hypertensive nephrosclerosis followed by Dr. Bryan Huddleston from our group.  Creatinine was up to 4 in March now down to 3.43.    2. Hypertension with CKD blood pressure is uncontrolled likely secondary to hypervolemia.  3. Acute CHF exacerbation  4. History of systolic and diastolic heart failure with EF of 45% and grade 2 diastolic failure.  5. Type 2 diabetes mellitus with CKD  6. Mild hyperkalemia: improved   7. Volume overload  8. Right pleural effusion status postthoracentesis  9. Anemia of CKD: History of iron deficiency anemia we will recheck iron today may need iron infusion  10. Normal anion gap metabolic acidosis likely secondary to CKD        PLAN:  · Excellent urine output last 24 hours on IV Lasix.  Currently on Bumex continue at 2 mg IV twice daily for today and reassess in the morning.  · Plan to switch to oral torsemide tomorrow in a.m.  · Check iron levels  · Continue accurate intake output  · Labs in a.m.      Thank you for involving us in the care of Carlito Mo.  Please feel free to call with any questions.    Ayad Bangura MD  05/03/23  16:11  EDT    [facility]  [phone]    Parts of this note may be an electronic transcription/translation of spoken language to printed text using the Dragon dictation system.

## 2023-05-03 NOTE — PLAN OF CARE
Goal Outcome Evaluation:  Plan of Care Reviewed With: patient           Outcome Evaluation: VSS. 2-3L nc. BG low this am, corrected with a snack and juice. C/o headache, PRN tylenol given. Up to bathroom with assist x1 and cane. Urinal at bedside. IV bumex scheduled for this morning. AM labs pending.

## 2023-05-03 NOTE — PROGRESS NOTES
"Nutrition Services    Patient Name:  Carlito Mo  YOB: 1955  MRN: [id number]  Admit Date:  5/2/2023    Assessment Date:  05/03/23    NUTRITION SCREENING      Reason for Encounter MST 2   Diagnosis/Problem Acute CHF       PO Diet Diet: Cardiac Diets, Diabetic Diets, Fluid Restriction (240 mL/tray) Diets; Healthy Heart (2-3 Na+); Consistent Carbohydrate; Other (Specify mL/day) (1200); Texture: Regular Texture (IDDSI 7); Fluid Consistency: Thin (IDDSI 0)   PO Supplements/Snacks n/a   PO Intake % % x 2 meals       Labs  Listed below, reviewed   Physical Findings Tooth/teeth missing   GI Function BM 5/3   Skin Status B=19, intact       Height:  Weight:  BMI:   Category  Weight Trend Height: 182.9 cm (72\")  Weight: 77.7 kg (171 lb 4.8 oz) (05/03/23 0553)  Body mass index is 23.23 kg/m².  Normal/Healthy (18.4 - 24.9)  Stable       Intervention/Plan MST 2.  % at meals.  Weight appears stable.  Follow as needed.         Results from last 7 days   Lab Units 05/03/23  0333 05/02/23  0720   SODIUM mmol/L 139 145   POTASSIUM mmol/L 4.9 5.3*   CHLORIDE mmol/L 108* 114*   CO2 mmol/L 21.0* 19.9*   BUN mg/dL 71* 69*   CREATININE mg/dL 3.43* 3.55*   CALCIUM mg/dL 8.4* 9.3   BILIRUBIN mg/dL  --  0.3   ALK PHOS U/L  --  108   ALT (SGPT) U/L  --  27   AST (SGOT) U/L  --  27   GLUCOSE mg/dL 49* 138*     Results from last 7 days   Lab Units 05/03/23  0333   PHOSPHORUS mg/dL 4.4   HEMOGLOBIN g/dL 7.8*   HEMATOCRIT % 24.4*     COVID19   Date Value Ref Range Status   05/02/2023 Not Detected Not Detected - Ref. Range Final     Lab Results   Component Value Date    HGBA1C 6.60 (H) 05/03/2023       RD to follow up per protocol.    Electronically signed by:  Cayla Sam RD  05/03/23 16:11 EDT  "

## 2023-05-03 NOTE — SIGNIFICANT NOTE
05/03/23 1402   OTHER   Discipline physical therapist   Rehab Time/Intention   Session Not Performed other (see comments)  (attempted multiple times. Pt was eating lunch then later wanted to sleep. Pt asked that PT follow up tomorrow)   Recommendation   PT - Next Appointment 05/04/23

## 2023-05-03 NOTE — CASE MANAGEMENT/SOCIAL WORK
Discharge Planning Assessment  [facility]     Patient Name: Carlito Mo  MRN: [id number]  Today's Date: 5/3/2023    Admit Date: 5/2/2023    Plan: Home with family to transport   Discharge Needs Assessment    No documentation.                Discharge Plan     Row Name 05/03/23 1346       Plan    Plan Home with family to transport    Patient/Family in Agreement with Plan yes    Plan Comments Spoke with patient at bedside. Introduced self and explained CCP role. Verified face sheet and local pharmacy [facility]. Patient states that he occasionally has difficulty with medication cost d/t ss checks don’t arrive until after the 1st of the month. Patient lives with spouse and 2 adult sons in a tri-level home with 3 steps to enter. Patient states that he has used [facility] in the past, and that he has been to [facility] and [facility] for SNF. Patient reports that he has a cane, shower chair, grab bars, and oxygen through [employer]; patient does state that he doesn’t really use his oxygen. Patient states that he is primarily IADL and drives sometimes, states that his spouse provides most transportation. Patient plans to return home with family to transport.              Continued Care and Services - Admitted Since 5/2/2023    Coordination has not been started for this encounter.          Demographic Summary     Row Name 05/03/23 2001       General Information    Admission Type inpatient    Arrived From emergency department    Required Notices Provided Important Message from Medicare    Referral Source admission list    Reason for Consult discharge planning    Preferred Language English       Contact Information    Permission Granted to Share Info With                Functional Status     Row Name 05/03/23 1345       Functional Status    Usual Activity Tolerance moderate    Current Activity Tolerance moderate       Functional Status, IADL    Medications independent    Meal Preparation independent    Housekeeping  independent    Laundry independent    Shopping assistive person;independent       Mental Status    General Appearance WDL WDL       Mental Status Summary    Recent Changes in Mental Status/Cognitive Functioning no changes       Employment/    Employment Status retired               Psychosocial    No documentation.                Abuse/Neglect    No documentation.                Legal    No documentation.                Substance Abuse    No documentation.                Patient Forms    No documentation.                   Marely Castro RN

## 2023-05-03 NOTE — PROGRESS NOTES
Name: Carlito Mo ADMIT: 2023   : 1955  PCP: Florencia Caballero MD    MRN: [id number] LOS: 1 days   AGE/SEX: 67 y.o. male  ROOM: [facility]     Subjective   Subjective   Breathing better. +BM. Slept fair. Appetite fair. Denies pain    Review of Systems   Constitutional: Negative for fever.   HENT: Negative for congestion.    Respiratory: Negative for shortness of breath.    Cardiovascular: Negative for chest pain.   Gastrointestinal: Negative for constipation.   Genitourinary: Negative for difficulty urinating.   Musculoskeletal: Negative for arthralgias.   Skin: Negative for rash.   Neurological: Negative for headaches.   Psychiatric/Behavioral: Negative for confusion.        Objective   Objective   Vital Signs  Temp:  [97.6 °F (36.4 °C)-97.9 °F (36.6 °C)] 97.8 °F (36.6 °C)  Heart Rate:  [61-69] 69  Resp:  [16-20] 16  BP: (130-136)/(67-77) 132/67  SpO2:  [87 %-95 %] 94 %  on  Flow (L/min):  [2-4] 2;   Device (Oxygen Therapy): nasal cannula  Body mass index is 23.23 kg/m².  Physical Exam  Vitals and nursing note reviewed.   Constitutional:       General: He is not in acute distress.     Appearance: He is ill-appearing. He is not toxic-appearing.   HENT:      Head: Normocephalic.      Mouth/Throat:      Mouth: Mucous membranes are moist.   Eyes:      Conjunctiva/sclera: Conjunctivae normal.   Pulmonary:      Effort: Pulmonary effort is normal. No respiratory distress.      Breath sounds: Decreased breath sounds present.   Abdominal:      General: Bowel sounds are normal.      Palpations: Abdomen is soft.   Musculoskeletal:      Cervical back: Neck supple.      Right lower leg: Edema present.      Left lower leg: Edema present.      Comments: Trace   Skin:     General: Skin is warm and dry.   Neurological:      Mental Status: He is alert and oriented to person, place, and time.   Psychiatric:         Mood and Affect: Mood normal.         Behavior: Behavior normal.       Results Review     I reviewed the  patient's new clinical results.  Results from last 7 days   Lab Units 05/03/23  0333 05/02/23  0720   WBC 10*3/mm3 6.09 6.28   HEMOGLOBIN g/dL 7.8* 8.8*   PLATELETS 10*3/mm3 280 248     Results from last 7 days   Lab Units 05/03/23  0333 05/02/23  0720   SODIUM mmol/L 139 145   POTASSIUM mmol/L 4.9 5.3*   CHLORIDE mmol/L 108* 114*   CO2 mmol/L 21.0* 19.9*   BUN mg/dL 71* 69*   CREATININE mg/dL 3.43* 3.55*   GLUCOSE mg/dL 49* 138*   EGFR mL/min/1.73 18.8* 18.0*     Results from last 7 days   Lab Units 05/03/23  0333 05/02/23  0720   ALBUMIN g/dL 3.5 4.0   BILIRUBIN mg/dL  --  0.3   ALK PHOS U/L  --  108   AST (SGOT) U/L  --  27   ALT (SGPT) U/L  --  27     Results from last 7 days   Lab Units 05/03/23  0333 05/02/23  0720   CALCIUM mg/dL 8.4* 9.3   ALBUMIN g/dL 3.5 4.0   PHOSPHORUS mg/dL 4.4  --      Results from last 7 days   Lab Units 05/02/23  0720   PROCALCITONIN ng/mL 0.10     Hemoglobin A1C   Date/Time Value Ref Range Status   05/03/2023 0333 6.60 (H) 4.80 - 5.60 % Final     Glucose   Date/Time Value Ref Range Status   05/03/2023 0937 137 (H) 70 - 130 mg/dL Final     Comment:     Meter: [id number] : [id number] [place] Maya BENZ   05/03/2023 0500 73 70 - 130 mg/dL Final     Comment:     Meter: [id number] : [id number] Mykel Rajput [facility]   05/03/2023 0429 52 (L) 70 - 130 mg/dL Final     Comment:     Meter: [id number] : [id number] Mykel Rajput [facility]   05/02/2023 2104 109 70 - 130 mg/dL Final     Comment:     Meter: [id number] : [id number] Mykel Rajput [facility]   05/02/2023 1725 134 (H) 70 - 130 mg/dL Final     Comment:     Meter: [id number] : [id number] Theodore RAMIREZ   05/02/2023 1553 161 (H) 70 - 130 mg/dL Final     Comment:     Meter: [id number] : [id number] Theodore RAMIREZ   05/02/2023 1205 122 70 - 130 mg/dL Final     Comment:     Meter: [id number] : [id number] Isai Jacobson RN       No radiology results for the last day  I have personally reviewed all medications:  Scheduled  Medications  amLODIPine, 5 mg, Oral, Daily  apixaban, 5 mg, Oral, Q12H  aspirin, 81 mg, Oral, Daily  budesonide-formoterol, 2 puff, Inhalation, BID - RT  bumetanide, 2 mg, Intravenous, Q12H  buPROPion XL, 150 mg, Oral, Daily  carvedilol, 6.25 mg, Oral, BID With Meals  hydrALAZINE, 25 mg, Oral, TID  insulin glargine, 1-200 Units, Subcutaneous, Nightly - Glucommander  insulin lispro, 1-200 Units, Subcutaneous, 4x Daily With Meals & Nightly  levothyroxine, 75 mcg, Oral, Q AM  rosuvastatin, 10 mg, Oral, Daily  sodium bicarbonate, 650 mg, Oral, TID  tamsulosin, 0.4 mg, Oral, Daily  tiotropium bromide monohydrate, 2 puff, Inhalation, Daily - RT    Infusions   Diet  Diet: Cardiac Diets, Diabetic Diets, Fluid Restriction (240 mL/tray) Diets; Healthy Heart (2-3 Na+); Consistent Carbohydrate; Other (Specify mL/day) (1200); Texture: Regular Texture (IDDSI 7); Fluid Consistency: Thin (IDDSI 0)    I have personally reviewed:  [x]  Laboratory   [x]  Microbiology   [x]  Radiology   [x]  EKG/Telemetry  [x]  Cardiology/Vascular   [x]  Pathology    [x]  Records       Assessment/Plan     Active Hospital Problems    Diagnosis  POA   • **Acute combined systolic and diastolic congestive heart failure [I50.41]  Yes   • History of stroke [Z86.73]  Not Applicable   • Paroxysmal atrial fibrillation [I48.0]  Yes   • Chronic anticoagulation [Z79.01]  Not Applicable   • CKD (chronic kidney disease) stage 4, GFR 15-29 ml/min [N18.4]  Yes   • Anemia, chronic disease [D63.8]  Yes   • Essential hypertension [I10]  Yes   • Chronically elevated troponin [R77.8]  Yes   • YAW (obstructive sleep apnea) [G47.33]  Yes   • Pleural effusion [J90]  Yes   • Acquired hypothyroidism [E03.9]  Yes   • Type 2 diabetes mellitus, with long-term current use of insulin [E11.9, Z79.4]  Not Applicable      Resolved Hospital Problems   No resolved problems to display.       67 y.o. male admitted with Acute combined systolic and diastolic congestive heart failure.      Chepe is a 67 y.o. male with a history of multiple medical issues, including but not limited to, hypothyroidism, CHF, CKD IV, anemia, CAD, COPD, DM, HTN, pleural effusion requiring thoracentesis, paroxysmal afib who is admitted for acute on chronic combined systolic and diastolic CHF with rt pleural effusion      Acute on chronic systolic & diastolic CHF/Pleural effusion//Paroxysmal afib/Chronically elevated troponin:  -Cardiology and Nephrology managing. IV bumex. Fluid restriction, low sodium diet. Strict I&O. Daily weights. Last thoracentesis 4/14/23, monitor response w/diuresis, repeat CXR in a.m. HR controlled, on carvedilol. HS troponin appears to be the same, chronically elevated. No EKG changes. Echo 3/6/23     CKD IV:  -Nephrology managing, assisting w/diuresis. Avoid nephrotoxic meds. Cr near baseline     HTN:  -BP acceptable acutely. Continue amlodipine, hydralazine     Hypothyroidism:  -TSH 2.490 3/2023. Continue levothyroxine     Anemia:  -Monitor Hgb. Iron level wnl 3/2023. Hgb lower today but near baseline     Hx stroke:  -ASA, statin    DM:  -Monitor BG trends. Insulin dosing per glucommander. +hypoglycemia, regimen adjusted. A1C 6.60%     COPD/YAW:  -No wheezing on exam. Encourage IS. Symbicort, Spiriva. Monitor on oximetry    PT eval      · Eliquis (home med) for DVT prophylaxis.  · Full code.  · Discussed with patient.  · Anticipate discharge home with family when cleared by consultants.      JI Sommers  [facility]  05/03/23  12:15 EDT

## 2023-05-03 NOTE — CONSULTS
Cardiology Hospital Consult    Patient Name: Carlito Mo  Age/Sex: 67 y.o. male  : 1955  MRN: [id number]    Date of Admission: 2023  Date of Encounter Visit: 23  Encounter Provider: JI Read  Referring Provider: Smith Henson MD  Place of Service: [facility] CARDIOLOGY  Patient Care Team:  Florencia Caballero MD as PCP - General (Internal Medicine)  Amber Murguia MD as Consulting Physician (Cardiology)  Sera La RPH as Pharmacist  OSeth Conte MD as Surgeon (General Surgery)  Kim Hoyos MD PhD as Consulting Physician (Hematology and Oncology)  Jerome Diallo MD as Referring Physician (Family Medicine)  Jose Enrique Louie MD as Consulting Physician (Gastroenterology)  Nima Huddleston MD as Consulting Physician (Nephrology)    Subjective:     Consulted for: congestive heart failure    Chief Complaint: shortness of air    History of Present Illness:  Carlito Mo is a 67 y.o. male who has a history of diastolic heart failure, paroxysmal atrial fibrillation (on Eliquis), chronic kidney disease stage IV, diabetes type 2, hypertension, recurrent strokes, suspected sleep apnea and noncompliance with medication.  Recent 2D echocardiogram on 3/8/2023 shows LV EF 41 to 45% with grade 3 diastolic dysfunction, mild tricuspid regurgitation with RVSP 59 mmHg and mildly dilated RV cavity with normal RV systolic function.  At that time, IVC was dilated and no inspiratory collapse noted.  Myocardial stress perfusion imaging in 2019 indicates a medium-sized infarct located in the apex/periapical areas with no significant ischemia noted.    Patient had previous admission 3/9/2023 for acute CHF exacerbation and required IV diuresis.  He required a right thoracentesis on 2023 with 1.8 L of fluid removed.  Patient presented to the emergency department yesterday morning with complaints of increased shortness of breath starting the  previous night.  He wears home O2 sometimes. He states that he takes his medication most of the time and admits to missing a few doses of his diuretic.  CXR shows pulmonary venous congestion with diffuse patchy airspace and interstitial opacities as well as moderate right pleural effusion.  Procalcitonin normal.  proBNP 8501.  Creatinine 3.43/ BUN 71.  HbA1c 6.6.  Hemoglobin 7.8, normal appears to be around 8.5-9.5.  Negative respiratory panel.  BP elevated on admission, now normal.  EKG sinus rhythm with right bundle branch block, shows no significant change from previous EKG on 3/7/2023.  High sensitivity troponin consistent with previous admission in March.     He has no complaints of chest pain.  His shortness of breath is improving.  Patient notes urine output has increased drastically.  He did not notice any increase in lower extremity edema.  Patient states that he came to the hospital because his wife made him come.    Past Medical History:  Past Medical History:   Diagnosis Date   • Acquired hypothyroidism    • Acute congestive heart failure    • Acute respiratory failure with hypoxia    • Acute sinusitis    • SYLVAIN (acute kidney injury)     on CKD   • Anemia    • Arthritis    • CAD (coronary artery disease)    • Cancer     skin   • Chronic combined systolic and diastolic congestive heart failure    • Chronic ischemic heart disease    • Chronic kidney disease (CKD)    • CKD (chronic kidney disease)    • COPD (chronic obstructive pulmonary disease)    • Depression    • Diabetes mellitus type 2, uncontrolled, without complications    • Diabetic neuropathy    • Disease of thyroid gland    • Elevated cholesterol    • Fatigue    • Frequent PVCs    • Generalized weakness    • GERD (gastroesophageal reflux disease)    • Heart attack    • History of coronary artery disease     with remote history of bypass surgery in 2001   • Hyperlipidemia    • Hypertension    • Hypertensive encephalopathy    • Mental status change      Acute   • NO DEVICE/RECALLED    • Pleural effusion    • Pneumonia    • Poor historian    • RBBB (right bundle branch block)    • Stroke     POOR VISION   • TIA (transient ischemic attack)    • Type 2 diabetes mellitus    • Urinary retention    • Vitamin D deficiency        Past Surgical History:   Procedure Laterality Date   • APPENDECTOMY N/A 02/14/2016    Dr. Alexey Dodson   • ARTERIOVENOUS FISTULA/SHUNT SURGERY Right 06/03/2022    Procedure: RIGHT FOREARM ARTERIOVENOUS FISTULA PLACEMENT RADIOCEPHALIC WITH CEPHALIC VEIN TRANSPOSITION;  Surgeon: Eliseo Willis MD;  Location: [facility] MAIN OR;  Service: Vascular;  Laterality: Right;   • COLONOSCOPY      over 20 years ago.  no polyps    • COLONOSCOPY N/A 09/18/2018    Procedure: COLONOSCOPY;  Surgeon: Jose Enrique Louie MD;  Location: [facility] ENDOSCOPY;  Service: Gastroenterology   • COLONOSCOPY N/A 09/19/2018    IH, EH, polyps (TAs w/low grade dysplasia)   • COLONOSCOPY N/A 06/01/2020    Procedure: COLONOSCOPY;  Surgeon: Jose Enrique Louie MD;  Location: [facility] ENDOSCOPY;  Service: Gastroenterology;  Laterality: N/A;  Pre op-Anemia, Melena, History of Polyps  Post op-To Cecum/TI, Polyp, Poor Prep   • CORONARY ARTERY BYPASS GRAFT  11/2001   • ENDOSCOPY N/A 05/29/2020    Procedure: ESOPHAGOGASTRODUODENOSCOPY with biopsies;  Surgeon: Amanda Lovelace MD;  Location: [facility] ENDOSCOPY;  Service: Gastroenterology;  Laterality: N/A;  pre-anemia, dark stools  post-esophagitis, hiatal hernia   • ENDOSCOPY N/A 09/15/2020    Procedure: ESOPHAGOGASTRODUODENOSCOPY WITH BIOPSIES;  Surgeon: Jose Enrique Louie MD;  Location: [facility] ENDOSCOPY;  Service: Gastroenterology;  Laterality: N/A;  pre: history of melena and esophagitis  post: mild esophagitis and gastritis, small hiatal hernia   • HERNIA REPAIR Left 12/05/2019   • THORACENTESIS     • TONSILLECTOMY     • VASECTOMY         Home Medications:   Medications Prior to Admission   Medication Sig Dispense Refill Last  Dose   • acetaminophen (TYLENOL) 325 MG tablet Take 2 tablets by mouth Every 4 (Four) Hours As Needed for Mild Pain.      • amLODIPine (NORVASC) 5 MG tablet Take 1 tablet by mouth Daily.      • apixaban (ELIQUIS) 5 MG tablet tablet Take 1 tablet by mouth Every 12 (Twelve) Hours. Indications: Atrial Fibrillation 60 tablet     • aspirin 81 MG EC tablet Take 1 tablet by mouth Daily. 90 tablet 3    • buPROPion XL (WELLBUTRIN XL) 150 MG 24 hr tablet Take 1 tablet by mouth Daily.      • carvedilol (COREG) 3.125 MG tablet Take 1 tablet by mouth 2 (Two) Times a Day With Meals.      • famotidine (PEPCID) 10 MG tablet Take 1 tablet by mouth 2 (Two) Times a Day As Needed for Heartburn.      • furosemide (LASIX) 20 MG tablet Take 10 mg by mouth Daily. Take one half tablet daily      • hydrALAZINE (APRESOLINE) 25 MG tablet Take 1 tablet by mouth 3 (Three) Times a Day.      • Insulin Glargine w/ Trans Port (Basaglar Tempo Pen) 100 UNIT/ML solution pen-injector Inject 2 Units under the skin into the appropriate area as directed Every Night.      • insulin glulisine (Apidra) 100 UNIT/ML injection Inject 3 Units under the skin into the appropriate area as directed 3 (Three) Times a Day Before Meals.      • ipratropium-albuterol (COMBIVENT RESPIMAT)  MCG/ACT inhaler Inhale 1 puff 4 (Four) Times a Day As Needed for Wheezing.      • levothyroxine (SYNTHROID, LEVOTHROID) 75 MCG tablet Take 1 tablet by mouth Daily. 30 tablet 5    • rosuvastatin (CRESTOR) 40 MG tablet Take 1 tablet by mouth Daily.      • tamsulosin (FLOMAX) 0.4 MG capsule 24 hr capsule Take 1 capsule by mouth Daily.          Allergies:  Allergies   Allergen Reactions   • Prozac [Fluoxetine Hcl] Other (See Comments)     shaking       Past Social History:  Social History     Socioeconomic History   • Marital status:      Spouse name: Lissette   • Number of children: 3   • Years of education: college   Tobacco Use   • Smoking status: Never   • Smokeless tobacco:  Never   • Tobacco comments:     CAFFEINE - OCCAS. SODA   Vaping Use   • Vaping Use: Never used   Substance and Sexual Activity   • Alcohol use: No     Comment: last drink 2 months ago   • Drug use: No   • Sexual activity: Defer       Past Family History:  Family History   Problem Relation Age of Onset   • Diabetes Mother    • Hypertension Mother    • Macular degeneration Mother    • Alcohol abuse Father    • Cancer Father         lung,  age 65   • Heart disease Father    • Alcohol abuse Brother    • Cirrhosis Brother          age 50   • Liver cancer Brother 45   • Diabetes Son    • Kidney failure Son    • Autism Son    • Malig Hyperthermia Neg Hx        Review of Systems:     CONSTITUTIONAL: No weight loss, fever, chills, weakness or fatigue.   HEENT: Eyes: No visual loss, blurred vision, double vision or yellow sclerae. Ears, Nose, Throat: No hearing loss, sneezing, congestion, runny nose or sore throat.   SKIN: No rash or itching.     RESPIRATORY: No shortness of breath, hemoptysis, cough or sputum.   GASTROINTESTINAL: No anorexia, nausea, vomiting or diarrhea. No abdominal pain, bright red blood per rectum or melena.  GENITOURINARY: No burning on urination, hematuria or increased frequency.  NEUROLOGICAL: No headache, dizziness, syncope, paralysis, ataxia, numbness or tingling in the extremities. No change in bowel or bladder control.   MUSCULOSKELETAL: No muscle, back pain, joint pain or stiffness.   HEMATOLOGIC: No anemia, bleeding or bruising.   LYMPHATICS: No enlarged nodes. No history of splenectomy.   PSYCHIATRIC: No history of depression, anxiety, hallucinations.   ENDOCRINOLOGIC: No reports of sweating, cold or heat intolerance. No polyuria or polydipsia.     Objective:   Temp:  [97.6 °F (36.4 °C)-97.9 °F (36.6 °C)] 97.8 °F (36.6 °C)  Heart Rate:  [61-86] 69  Resp:  [16-20] 16  BP: (130-177)/(67-98) 132/67     Intake/Output Summary (Last 24 hours) at 5/3/2023 0974  Last data filed at  5/3/2023 0626  Gross per 24 hour   Intake 1715 ml   Output 3255 ml   Net -1540 ml     Body mass index is 23.23 kg/m².      05/02/23  0602 05/02/23  1149 05/03/23  0553   Weight: 81.6 kg (180 lb) 77.8 kg (171 lb 8.3 oz) 77.7 kg (171 lb 4.8 oz)     Weight change: -3.847 kg (-8 lb 7.7 oz)    Physical Exam:   General Appearance:    Alert, cooperative, in no acute distress   Head:    Normocephalic, without obvious abnormality, atraumatic   Eyes:            Conjunctivae and sclerae normal, no   icterus, no pallor, corneas clear, PERRLA   Neck:   No adenopathy, supple, trachea midline, no thyromegaly, no   carotid bruit, no JVD   Lungs:    Inspiratory crackles left base, slightly diminished right base,respirations regular, even and unlabored    Heart:    Regular rhythm and normal rate, normal S1 and S2, no murmur, no gallop, no rub, no click   Chest Wall:    No abnormalities observed   Abdomen:     Normal bowel sounds, no masses, no organomegaly, soft, nontender, nondistended, no guarding, no rebound  tenderness   Extremities:   Moves all extremities well, no edema, no cyanosis, no redness   Pulses:   Pulses palpable and equal bilaterally.      Lab Review:   Results from last 7 days   Lab Units 05/03/23 0333 05/02/23  0720   SODIUM mmol/L 139 145   POTASSIUM mmol/L 4.9 5.3*   CHLORIDE mmol/L 108* 114*   CO2 mmol/L 21.0* 19.9*   BUN mg/dL 71* 69*   CREATININE mg/dL 3.43* 3.55*   GLUCOSE mg/dL 49* 138*   CALCIUM mg/dL 8.4* 9.3   AST (SGOT) U/L  --  27   ALT (SGPT) U/L  --  27     Results from last 7 days   Lab Units 05/02/23  0913 05/02/23  0720   HSTROP T ng/L 96* 98*     Results from last 7 days   Lab Units 05/03/23  0333 05/02/23  0720   WBC 10*3/mm3 6.09 6.28   HEMOGLOBIN g/dL 7.8* 8.8*   HEMATOCRIT % 24.4* 27.8*   PLATELETS 10*3/mm3 280 248     Results from last 7 days   Lab Units 05/02/23  0720   INR  1.23*   APTT seconds 28.7               Invalid input(s): LDLCALC  Results from last 7 days   Lab Units 05/02/23 0720    PROBNP pg/mL 8,501.0*           2D Echo 2023:  •  There is akinesis of the mid to distal inferoseptum, distal inferior wall, and inferior apex  •  Left ventricular ejection fraction appears to be 41 - 45%.  •  Left ventricular diastolic function is consistent with (grade III w/high LAP) fixed restrictive pattern.  •  The right ventricular cavity is mildly dilated. Normal right ventricular systolic function noted.  •  The left atrial cavity is moderately dilated.  •  Mild tricuspid valve regurgitation is present.  •  Calculated right ventricular systolic pressure from tricuspid regurgitation is 59 mmHg.  •  The inferior vena cava is dilated. IVC inspiratory collapse is absent.  •  There is no evidence of pericardial effusion.    2D Echo 2022:  • Calculated left ventricular 3D EF = 56% Left ventricular systolic function is normal.  • Saline test results are negative.  • Left ventricular diastolic function was normal.  • Left atrial volume is moderately increased.  • Estimated right ventricular systolic pressure from tricuspid regurgitation is normal (<35 mmHg).     Lexiscan Stress test 2019:  • Myocardial perfusion imaging indicates a medium-sized infarct located in the apex/periapical areas with no significant ischemia noted.  • Left ventricular ejection fraction is mildly reduced (Calculated EF = 44%).     EK2023:      2023:      Imaging:  Imaging Results (Most Recent)     Procedure Component Value Units Date/Time    XR Chest 1 View [[id number]] Collected: 23     Updated: 23    Narrative:      XR CHEST 1 VW-     HISTORY:  Dyspnea, COPD.     COMPARISON:  Chest radiograph 2023     FINDINGS:    A single view of the chest was obtained.  The cardiac silhouette is partially obscured. There are postsurgical  changes from CABG. There is calcific aortic atherosclerosis. There is  central pulmonary venous congestion with relatively diffuse patchy  airspace and  interstitial opacities, right greater than left, as well as  more dense opacity at the lung bases, also right greater than left.  There is a moderate right pleural effusion. Findings are concerning for  pulmonary edema versus multifocal pneumonia in the appropriate clinical  setting, new from 03/8/2023, likely also with a component of  atelectasis. Short-term follow-up chest radiographs are recommended to  document resolution.  Median sternotomy wires are present.     This report was finalized on 5/2/2023 7:24 AM by Dr. Katrin Cui M.D.               Assessment:       Acute combined systolic and diastolic congestive heart failure    Acquired hypothyroidism    Pleural effusion    YAW (obstructive sleep apnea)    Chronically elevated troponin    Essential hypertension    Anemia, chronic disease    CKD (chronic kidney disease) stage 4, GFR 15-29 ml/min    Paroxysmal atrial fibrillation    Chronic anticoagulation    History of stroke        Plan:     1.  Acute on chronic systolic/diastolic heart failure: EF 41 to 45%, grade 3 diastolic dysfunction.  Noncompliance with medications.  May have missed diuretic a couple times.  CXR shows moderate pleural effusion and vascular congestion.  Elevated BNP.  Nephrology dosing diuretics.  Switch to Bumex 2 mg IV twice daily for 3 doses yesterday.  Fluid and sodium restriction.  Appears to be diuresing well.  2.  Acute kidney injury on chronic kidney disease stage IV: Nephrology following.  Dosing diuretics  3.  Anemia: Hemoglobin 7.8.  Likely secondary to chronic kidney disease  4.  Hypertension: Elevated on admission.  Now controlled  5.  Paroxysmal atrial fibrillation: Anticoagulation with Eliquis. SR on tele.   6.  History of stroke  7.  Recurrent pleural effusion: s/p right thoracentesis 04/14/2023 with 1.8L fluid removed  8.  Chronically elevated high-sensitivity troponin: Secondary to fluid overload      Thank you for allowing me to participate in the care of Carlito Mo.  Feel free to contact me directly with any further questions or concerns.    JI Read  [facility]  05/03/23  09:19 EDT    EMR Dragon/Transcription disclaimer:   Much of this encounter note is an electronic transcription/translation of spoken language to printed text. The electronic translation of spoken language may permit erroneous, or at times, nonsensical words or phrases to be inadvertently transcribed; Although I have reviewed the note for such errors, some may still exist.

## 2023-05-04 PROBLEM — D50.9 IRON DEFICIENCY ANEMIA: Status: ACTIVE | Noted: 2023-05-04

## 2023-05-04 LAB
ALBUMIN SERPL-MCNC: 3.2 G/DL (ref 3.5–5.2)
ANION GAP SERPL CALCULATED.3IONS-SCNC: 10.3 MMOL/L (ref 5–15)
BASOPHILS # BLD AUTO: 0.01 10*3/MM3 (ref 0–0.2)
BASOPHILS NFR BLD AUTO: 0.2 % (ref 0–1.5)
BUN SERPL-MCNC: 75 MG/DL (ref 8–23)
BUN/CREAT SERPL: 19.8 (ref 7–25)
CALCIUM SPEC-SCNC: 8.3 MG/DL (ref 8.6–10.5)
CHLORIDE SERPL-SCNC: 105 MMOL/L (ref 98–107)
CO2 SERPL-SCNC: 20.7 MMOL/L (ref 22–29)
CREAT SERPL-MCNC: 3.78 MG/DL (ref 0.76–1.27)
DEPRECATED RDW RBC AUTO: 42.9 FL (ref 37–54)
EGFRCR SERPLBLD CKD-EPI 2021: 16.7 ML/MIN/1.73
EOSINOPHIL # BLD AUTO: 0.23 10*3/MM3 (ref 0–0.4)
EOSINOPHIL NFR BLD AUTO: 4.5 % (ref 0.3–6.2)
ERYTHROCYTE [DISTWIDTH] IN BLOOD BY AUTOMATED COUNT: 13.9 % (ref 12.3–15.4)
FERRITIN SERPL-MCNC: 283 NG/ML (ref 30–400)
GLUCOSE BLDC GLUCOMTR-MCNC: 114 MG/DL (ref 70–130)
GLUCOSE BLDC GLUCOMTR-MCNC: 129 MG/DL (ref 70–130)
GLUCOSE BLDC GLUCOMTR-MCNC: 139 MG/DL (ref 70–130)
GLUCOSE BLDC GLUCOMTR-MCNC: 184 MG/DL (ref 70–130)
GLUCOSE SERPL-MCNC: 160 MG/DL (ref 65–99)
HCT VFR BLD AUTO: 23.3 % (ref 37.5–51)
HGB BLD-MCNC: 7.3 G/DL (ref 13–17.7)
IMM GRANULOCYTES # BLD AUTO: 0.02 10*3/MM3 (ref 0–0.05)
IMM GRANULOCYTES NFR BLD AUTO: 0.4 % (ref 0–0.5)
IRON 24H UR-MRATE: 33 MCG/DL (ref 59–158)
IRON SATN MFR SERPL: 12 % (ref 20–50)
LYMPHOCYTES # BLD AUTO: 0.65 10*3/MM3 (ref 0.7–3.1)
LYMPHOCYTES NFR BLD AUTO: 12.6 % (ref 19.6–45.3)
MCH RBC QN AUTO: 26.7 PG (ref 26.6–33)
MCHC RBC AUTO-ENTMCNC: 31.3 G/DL (ref 31.5–35.7)
MCV RBC AUTO: 85.3 FL (ref 79–97)
MONOCYTES # BLD AUTO: 0.48 10*3/MM3 (ref 0.1–0.9)
MONOCYTES NFR BLD AUTO: 9.3 % (ref 5–12)
NEUTROPHILS NFR BLD AUTO: 3.76 10*3/MM3 (ref 1.7–7)
NEUTROPHILS NFR BLD AUTO: 73 % (ref 42.7–76)
NRBC BLD AUTO-RTO: 0 /100 WBC (ref 0–0.2)
PHOSPHATE SERPL-MCNC: 4.2 MG/DL (ref 2.5–4.5)
PLATELET # BLD AUTO: 228 10*3/MM3 (ref 140–450)
PMV BLD AUTO: 10.4 FL (ref 6–12)
POTASSIUM SERPL-SCNC: 5.3 MMOL/L (ref 3.5–5.2)
RBC # BLD AUTO: 2.73 10*6/MM3 (ref 4.14–5.8)
SODIUM SERPL-SCNC: 136 MMOL/L (ref 136–145)
TIBC SERPL-MCNC: 271 MCG/DL (ref 298–536)
TRANSFERRIN SERPL-MCNC: 182 MG/DL (ref 200–360)
WBC NRBC COR # BLD: 5.15 10*3/MM3 (ref 3.4–10.8)

## 2023-05-04 PROCEDURE — 82728 ASSAY OF FERRITIN: CPT | Performed by: HOSPITALIST

## 2023-05-04 PROCEDURE — 80069 RENAL FUNCTION PANEL: CPT | Performed by: HOSPITALIST

## 2023-05-04 PROCEDURE — 83540 ASSAY OF IRON: CPT | Performed by: HOSPITALIST

## 2023-05-04 PROCEDURE — 82948 REAGENT STRIP/BLOOD GLUCOSE: CPT

## 2023-05-04 PROCEDURE — 97116 GAIT TRAINING THERAPY: CPT

## 2023-05-04 PROCEDURE — 94664 DEMO&/EVAL PT USE INHALER: CPT

## 2023-05-04 PROCEDURE — 97162 PT EVAL MOD COMPLEX 30 MIN: CPT

## 2023-05-04 PROCEDURE — G0378 HOSPITAL OBSERVATION PER HR: HCPCS

## 2023-05-04 PROCEDURE — 85025 COMPLETE CBC W/AUTO DIFF WBC: CPT | Performed by: INTERNAL MEDICINE

## 2023-05-04 PROCEDURE — 94799 UNLISTED PULMONARY SVC/PX: CPT

## 2023-05-04 PROCEDURE — 94761 N-INVAS EAR/PLS OXIMETRY MLT: CPT

## 2023-05-04 PROCEDURE — 99232 SBSQ HOSP IP/OBS MODERATE 35: CPT | Performed by: STUDENT IN AN ORGANIZED HEALTH CARE EDUCATION/TRAINING PROGRAM

## 2023-05-04 PROCEDURE — 25010000002 NA FERRIC GLUC CPLX PER 12.5 MG: Performed by: HOSPITALIST

## 2023-05-04 PROCEDURE — 84466 ASSAY OF TRANSFERRIN: CPT | Performed by: HOSPITALIST

## 2023-05-04 RX ORDER — FERROUS SULFATE 325(65) MG
325 TABLET ORAL
Status: DISCONTINUED | OUTPATIENT
Start: 2023-05-04 | End: 2023-05-05 | Stop reason: HOSPADM

## 2023-05-04 RX ORDER — TORSEMIDE 100 MG/1
100 TABLET ORAL DAILY
Status: DISCONTINUED | OUTPATIENT
Start: 2023-05-04 | End: 2023-05-05 | Stop reason: HOSPADM

## 2023-05-04 RX ADMIN — BUDESONIDE AND FORMOTEROL FUMARATE DIHYDRATE 2 PUFF: 160; 4.5 AEROSOL RESPIRATORY (INHALATION) at 07:37

## 2023-05-04 RX ADMIN — TAMSULOSIN HYDROCHLORIDE 0.4 MG: 0.4 CAPSULE ORAL at 09:05

## 2023-05-04 RX ADMIN — HYDRALAZINE HYDROCHLORIDE 25 MG: 25 TABLET, FILM COATED ORAL at 09:05

## 2023-05-04 RX ADMIN — TORSEMIDE 100 MG: 100 TABLET ORAL at 12:39

## 2023-05-04 RX ADMIN — SODIUM BICARBONATE 650 MG: 650 TABLET ORAL at 21:01

## 2023-05-04 RX ADMIN — APIXABAN 5 MG: 5 TABLET, FILM COATED ORAL at 09:05

## 2023-05-04 RX ADMIN — SODIUM BICARBONATE 650 MG: 650 TABLET ORAL at 16:25

## 2023-05-04 RX ADMIN — INSULIN GLARGINE-YFGN 3 UNITS: 100 INJECTION, SOLUTION SUBCUTANEOUS at 21:01

## 2023-05-04 RX ADMIN — ASPIRIN 81 MG: 81 TABLET, COATED ORAL at 09:05

## 2023-05-04 RX ADMIN — SODIUM BICARBONATE 650 MG: 650 TABLET ORAL at 09:05

## 2023-05-04 RX ADMIN — AMLODIPINE BESYLATE 5 MG: 5 TABLET ORAL at 09:05

## 2023-05-04 RX ADMIN — HYDRALAZINE HYDROCHLORIDE 25 MG: 25 TABLET, FILM COATED ORAL at 16:25

## 2023-05-04 RX ADMIN — BUDESONIDE AND FORMOTEROL FUMARATE DIHYDRATE 2 PUFF: 160; 4.5 AEROSOL RESPIRATORY (INHALATION) at 21:58

## 2023-05-04 RX ADMIN — HYDRALAZINE HYDROCHLORIDE 25 MG: 25 TABLET, FILM COATED ORAL at 21:01

## 2023-05-04 RX ADMIN — CARVEDILOL 6.25 MG: 6.25 TABLET, FILM COATED ORAL at 18:57

## 2023-05-04 RX ADMIN — SODIUM ZIRCONIUM CYCLOSILICATE 10 G: 10 POWDER, FOR SUSPENSION ORAL at 12:39

## 2023-05-04 RX ADMIN — APIXABAN 5 MG: 5 TABLET, FILM COATED ORAL at 21:01

## 2023-05-04 RX ADMIN — LEVOTHYROXINE SODIUM 75 MCG: 0.07 TABLET ORAL at 06:26

## 2023-05-04 RX ADMIN — SODIUM CHLORIDE 250 MG: 9 INJECTION, SOLUTION INTRAVENOUS at 14:45

## 2023-05-04 RX ADMIN — BUPROPION HYDROCHLORIDE 150 MG: 150 TABLET, EXTENDED RELEASE ORAL at 09:05

## 2023-05-04 RX ADMIN — ROSUVASTATIN CALCIUM 10 MG: 10 TABLET, FILM COATED ORAL at 09:05

## 2023-05-04 RX ADMIN — CARVEDILOL 6.25 MG: 6.25 TABLET, FILM COATED ORAL at 09:05

## 2023-05-04 RX ADMIN — TIOTROPIUM BROMIDE INHALATION SPRAY 2 PUFF: 3.12 SPRAY, METERED RESPIRATORY (INHALATION) at 07:38

## 2023-05-04 NOTE — PROGRESS NOTES
[facility] Progress Note      Patient Name: Carlito Mo  : 1955  MRN: [id number]  Primary Care Physician:  Florencia Caballero MD  Date of admission: 2023    Subjective     Interval History:   The patient was seen and examined today for follow-up on  SYLVAIN       Review of Systems:   Patient is breathing much better today.  Had more than 4 bowel movements yesterday  Urine output has not been recorded properly as he urinated in the toilet.  Weight slightly up.  Oxygen requirements are improving was on room air earlier    Objective     Vitals:   Temp:  [97.4 °F (36.3 °C)-99 °F (37.2 °C)] 97.4 °F (36.3 °C)  Heart Rate:  [59-70] 65  Resp:  [17-20] 18  BP: (123-136)/(69-73) 136/69  Flow (L/min):  [2] 2    Intake/Output Summary (Last 24 hours) at 2023 0919  Last data filed at 2023 0906  Gross per 24 hour   Intake 560 ml   Output 1125 ml   Net -565 ml       Physical Exam:    General Appearance: Mildly confused today  Skin: warm and dry  HEENT: oral mucosa normal, nonicteric sclera  Neck: supple, no JVD  Lungs: CTA  Heart: RRR, normal S1 and S2  Abdomen: soft, nontender, nondistended  : no palpable bladder  Extremities: trace  bilateral lower extremity edema, cyanosis or clubbing  Neuro: normal speech and mental status     Scheduled Meds:     amLODIPine, 5 mg, Oral, Daily  apixaban, 5 mg, Oral, Q12H  aspirin, 81 mg, Oral, Daily  budesonide-formoterol, 2 puff, Inhalation, BID - RT  buPROPion XL, 150 mg, Oral, Daily  carvedilol, 6.25 mg, Oral, BID With Meals  ferric gluconate, 250 mg, Intravenous, Once  hydrALAZINE, 25 mg, Oral, TID  insulin glargine, 1-200 Units, Subcutaneous, Nightly - Glucommander  insulin lispro, 1-200 Units, Subcutaneous, 4x Daily With Meals & Nightly  levothyroxine, 75 mcg, Oral, Q AM  rosuvastatin, 10 mg, Oral, Daily  sodium bicarbonate, 650 mg, Oral, TID  tamsulosin, 0.4 mg, Oral, Daily  tiotropium bromide monohydrate, 2 puff, Inhalation, Daily -  RT      IV Meds:        Results Reviewed:   I have personally reviewed the results from the time of this admission to 5/4/2023 09:19 EDT     Results from last 7 days   Lab Units 05/04/23 0328 05/03/23  0333 05/02/23  0720   SODIUM mmol/L 136 139 145   POTASSIUM mmol/L 5.3* 4.9 5.3*   CHLORIDE mmol/L 105 108* 114*   CO2 mmol/L 20.7* 21.0* 19.9*   BUN mg/dL 75* 71* 69*   CREATININE mg/dL 3.78* 3.43* 3.55*   CALCIUM mg/dL 8.3* 8.4* 9.3   BILIRUBIN mg/dL  --   --  0.3   ALK PHOS U/L  --   --  108   ALT (SGPT) U/L  --   --  27   AST (SGOT) U/L  --   --  27   GLUCOSE mg/dL 160* 49* 138*       Estimated Creatinine Clearance: 21 mL/min (A) (by C-G formula based on SCr of 3.78 mg/dL (H)).    Results from last 7 days   Lab Units 05/04/23 0328 05/03/23  0333   PHOSPHORUS mg/dL 4.2 4.4             Results from last 7 days   Lab Units 05/04/23 0328 05/03/23  0333 05/02/23  0720   WBC 10*3/mm3 5.15 6.09 6.28   HEMOGLOBIN g/dL 7.3* 7.8* 8.8*   PLATELETS 10*3/mm3 228 280 248       Results from last 7 days   Lab Units 05/02/23  0720   INR  1.23*       Assessment / Plan     ASSESSMENT:  1. Acute kidney injury on top of chronic kidney disease stage IV secondary to diabetic and hypertensive nephrosclerosis followed by Dr. Bryan Huddleston from our group.    Creatinine was up to 4 in March now at 3.78.  Etiology of acute kidney injury likely secondary to cardiorenal syndrome  2. Hypertension with CKD: Blood pressure is better controlled after diuresis.  3. Acute CHF exacerbation  4. History of systolic and diastolic heart failure with EF of 45% and grade 2 diastolic failure.  5. Type 2 diabetes mellitus with CKD  6. Mild hyperkalemia  7. Volume overload seems to be improving  8. Right pleural effusion status post thoracentesis  9. Anemia of CKD: Iron panel in favor of iron deficiency anemia  10. Normal anion gap metabolic acidosis likely secondary to CKD        PLAN:  · His volume status is improving.  We will switch patient to torsemide  100 mg daily.  · Stop Bumex.  · We will give 1 dose of Lokelma today given hyperkalemia  · Start iron infusion today with Ferrlecit with 250 mg once  · Continue accurate intake output.  Discussed with nursing staff  · Labs in a.m.  · Continue fluid restriction.  · I did discuss with patient and nursing staff the importance of accurate measurement of urine output    Thank you for involving us in the care of Carlito Mo.  Please feel free to call with any questions.    Ayad Bangura MD  05/04/23  09:19 EDT    [facility]  [phone]    Parts of this note may be an electronic transcription/translation of spoken language to printed text using the Dragon dictation system.

## 2023-05-04 NOTE — SIGNIFICANT NOTE
05/04/23 0737   Oxygen Therapy   SpO2 97 %   Pulse Oximetry Type Continuous   Device (Oxygen Therapy) room air  (weaned to room air)

## 2023-05-04 NOTE — THERAPY EVALUATION
Patient Name: Carlito Mo  : 1955    MRN: [id number]                              Today's Date: 2023       Admit Date: 2023    Visit Dx:     ICD-10-CM ICD-9-CM   1. Acute combined systolic and diastolic congestive heart failure  I50.41 428.41     428.0   2. Chronic kidney disease, unspecified CKD stage  N18.9 585.9   3. Hypoxia  R09.02 799.02   4. Elevated troponin level not due myocardial infarction  R77.8 790.6     Patient Active Problem List   Diagnosis   • Major depressive disorder with single episode, in partial remission   • Other hyperlipidemia   • Type 2 diabetes mellitus, with long-term current use of insulin   • Vitamin D deficiency   • Erectile dysfunction   • Chronic fatigue   • 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)   • Skin lesion of chest wall   • Slow transit constipation   • Benign prostatic hyperplasia with nocturia   • History of basal cell carcinoma   • High blood pressure associated with diabetes   • Acquired hypothyroidism   • Hypertensive urgency   • Recurrent strokes   • Noncompliance w/medication treatment due to intermit use of medication   • Urinary retention   • Pneumonia   • Acute on chronic combined systolic and diastolic congestive heart failure   • Acute respiratory failure with hypoxia   • Suspected sleep apnea   • Pleural effusion   • YAW (obstructive sleep apnea)   • Coronary artery disease involving native coronary artery of native heart without angina pectoris   • Chronically elevated troponin   • Colon cancer screening   • Enlarged lymph nodes   • Functional gait abnormality   • History of myocardial infarction   • Hospital discharge follow-up   • Insomnia   • Iron deficiency anemia   • Major depression, recurrent   • Anemia, chronic renal failure, stage 3 (moderate) (HCC)   • Essential hypertension   • Adverse effect of iron and its compounds, initial encounter   • Acute on chronic renal insufficiency   • Debility   • Falls frequently   •  Acute pain of right shoulder   • Acute on chronic anemia   • Heme positive stool   • Neurogenic orthostatic hypotension   • Anemia, chronic disease   • History of colon polyps   • Helicobacter pylori gastritis   • Esophagitis   • Sleep related hypoxia   • Embolic stroke   • Patent foramen ovale   • Pleural effusion, bilateral   • Cardiomyopathy (HCC)   • Lower abdominal pain   • Diarrhea   • CKD (chronic kidney disease) stage 4, GFR 15-29 ml/min   • Hypertension not at goal   • Memory loss due to medical condition   • Generalized weakness   • ERRONEOUS ENCOUNTER--DISREGARD   • Weakness   • Paroxysmal atrial fibrillation   • Chronic anticoagulation   • Multiple falls   • Dehydration   • SYLVAIN (acute kidney injury)   • Acute ischemic stroke   • Acute combined systolic and diastolic congestive heart failure   • History of stroke   • Iron deficiency anemia     Past Medical History:   Diagnosis Date   • Acquired hypothyroidism    • Acute congestive heart failure    • Acute respiratory failure with hypoxia    • Acute sinusitis    • SYLVAIN (acute kidney injury)     on CKD   • Anemia    • Arthritis    • CAD (coronary artery disease)    • Cancer     skin   • Chronic combined systolic and diastolic congestive heart failure    • Chronic ischemic heart disease    • Chronic kidney disease (CKD)    • CKD (chronic kidney disease)    • COPD (chronic obstructive pulmonary disease)    • Depression    • Diabetes mellitus type 2, uncontrolled, without complications    • Diabetic neuropathy    • Disease of thyroid gland    • Elevated cholesterol    • Fatigue    • Frequent PVCs    • Generalized weakness    • GERD (gastroesophageal reflux disease)    • Heart attack    • History of coronary artery disease     with remote history of bypass surgery in 2001   • Hyperlipidemia    • Hypertension    • Hypertensive encephalopathy    • Mental status change     Acute   • NO DEVICE/RECALLED    • Pleural effusion    • Pneumonia    • Poor historian    •  RBBB (right bundle branch block)    • Stroke     POOR VISION   • TIA (transient ischemic attack)    • Type 2 diabetes mellitus    • Urinary retention    • Vitamin D deficiency      Past Surgical History:   Procedure Laterality Date   • APPENDECTOMY N/A 02/14/2016    Dr. Alexey Dodson   • ARTERIOVENOUS FISTULA/SHUNT SURGERY Right 06/03/2022    Procedure: RIGHT FOREARM ARTERIOVENOUS FISTULA PLACEMENT RADIOCEPHALIC WITH CEPHALIC VEIN TRANSPOSITION;  Surgeon: Eliseo Willis MD;  Location: [facility] MAIN OR;  Service: Vascular;  Laterality: Right;   • COLONOSCOPY      over 20 years ago.  no polyps    • COLONOSCOPY N/A 09/18/2018    Procedure: COLONOSCOPY;  Surgeon: Jose Enrique Louie MD;  Location: [facility] ENDOSCOPY;  Service: Gastroenterology   • COLONOSCOPY N/A 09/19/2018    IH, EH, polyps (TAs w/low grade dysplasia)   • COLONOSCOPY N/A 06/01/2020    Procedure: COLONOSCOPY;  Surgeon: Jose Enrique Louie MD;  Location[facility] ENDOSCOPY;  Service: Gastroenterology;  Laterality: N/A;  Pre op-Anemia, Melena, History of Polyps  Post op-To Cecum/TI, Polyp, Poor Prep   • CORONARY ARTERY BYPASS GRAFT  11/2001   • ENDOSCOPY N/A 05/29/2020    Procedure: ESOPHAGOGASTRODUODENOSCOPY with biopsies;  Surgeon: Amanda Lovelace MD;  Location: [facility] ENDOSCOPY;  Service: Gastroenterology;  Laterality: N/A;  pre-anemia, dark stools  post-esophagitis, hiatal hernia   • ENDOSCOPY N/A 09/15/2020    Procedure: ESOPHAGOGASTRODUODENOSCOPY WITH BIOPSIES;  Surgeon: Jose Enrique Louie MD;  Location: [facility] ENDOSCOPY;  Service: Gastroenterology;  Laterality: N/A;  pre: history of melena and esophagitis  post: mild esophagitis and gastritis, small hiatal hernia   • HERNIA REPAIR Left 12/05/2019   • THORACENTESIS     • TONSILLECTOMY     • VASECTOMY        General Information     Row Name 05/04/23 1005          Physical Therapy Time and Intention    Document Type evaluation  -ST     Mode of Treatment individual therapy;physical therapy  -ST      [facility] Name 05/04/23 1005          General Information    Patient Profile Reviewed yes  -ST     Prior Level of Function independent:  -ST     Existing Precautions/Restrictions fall  -ST     Barriers to Rehab previous functional deficit  -[facility] Name 05/04/23 1005          Living Environment    People in Home child(carlos), adult;spouse  -[facility] Name 05/04/23 1005          Home Main Entrance    Number of Stairs, Main Entrance five  -ST     Stair Railings, Main Entrance railings safe and in good condition  -[facility] Name 05/04/23 1005          Stairs Within Home, Primary    Number of Stairs, Within Home, Primary five  -ST     Stair Railings, Within Home, Primary railings safe and in good condition  -[facility] Name 05/04/23 1005          Safety Issues, Functional Mobility    Safety Issues Affecting Function (Mobility) awareness of need for assistance;safety precautions follow-through/compliance  -ST     Impairments Affecting Function (Mobility) balance;endurance/activity tolerance;strength;shortness of breath  -     Comment, Safety Issues/Impairments (Mobility) gait belt, nonskid socks donned  -ST           User Key  (r) = Recorded By, (t) = Taken By, (c) = Cosigned By    Initials Name Provider Type    Louise Tong PT Physical Therapist               Mobility     [facility] Name 05/04/23 1006          Bed Mobility    Bed Mobility supine-sit;sit-supine  -ST     Supine-Sit [place] (Bed Mobility) standby assist;verbal cues  -ST     Sit-Supine [place] (Bed Mobility) standby assist;verbal cues  -ST     Assistive Device (Bed Mobility) head of bed elevated;bed rails  -ST     Row Name 05/04/23 1006          Sit-Stand Transfer    Sit-Stand [place] (Transfers) standby assist;verbal cues  -ST     Assistive Device (Sit-Stand Transfers) cane, straight;walker, front-wheeled  -[facility] Name 05/04/23 1006          Gait/Stairs (Locomotion)    [place] Level (Gait) standby assist;verbal cues;contact  guard  -ST     Assistive Device (Gait) walker, front-wheeled;cane, straight  -ST     Distance in Feet (Gait) 50' with SPC, 25' with 2ww  -ST     Deviations/Abnormal Patterns (Gait) bilateral deviations;eli decreased;gait speed decreased;stride length decreased  -ST     Bilateral Gait Deviations forward flexed posture;heel strike decreased  -ST     [place] Level (Stairs) unable to assess  -ST     Comment, (Gait/Stairs) improved balance with 2ww vs SPC, more lateral sway noted using SPC  -ST           User Key  (r) = Recorded By, (t) = Taken By, (c) = Cosigned By    Initials Name Provider Type    ST Louise Simms, PT Physical Therapist               Obj/Interventions     [facility] Name 05/04/23 1007          Range of Motion Comprehensive    General Range of Motion no range of motion deficits identified  -[facility] Name 05/04/23 1007          Strength Comprehensive (MMT)    Comment, General Manual Muscle Testing (MMT) Assessment bilat LE WFL, grossly 3+/5  -ST     [facility] Name 05/04/23 1007          Balance    Comment, Balance SBA for sitting balance, SBA/CGA with standing dynamic balance - improved with 2ww vs SPC  -ST           User Key  (r) = Recorded By, (t) = Taken By, (c) = Cosigned By    Initials Name Provider Type    ST Louise Simms, PT Physical Therapist               Goals/Plan     [facility] Name 05/04/23 1008          Transfer Goal 1 (PT)    Activity/Assistive Device (Transfer Goal 1, PT) transfers, all  -ST     [place] Level/Cues Needed (Transfer Goal 1, PT) modified independence  -ST     Time Frame (Transfer Goal 1, PT) 1 week  -ST     Progress/Outcome (Transfer Goal 1, PT) new goal  -[facility] Name 05/04/23 1008          Gait Training Goal 1 (PT)    Activity/Assistive Device (Gait Training Goal 1, PT) gait (walking locomotion);assistive device use  -ST     [place] Level (Gait Training Goal 1, PT) modified independence  -ST     Distance (Gait Training Goal 1, PT) 75'  -ST     Time Frame  (Gait Training Goal 1, PT) 1 week  -ST     Progress/Outcome (Gait Training Goal 1, PT) new goal  -ST     Row Name 05/04/23 1008          Stairs Goal 1 (PT)    Activity/Assistive Device (Stairs Goal 1, PT) stairs, all skills  -ST     [place] Level/Cues Needed (Stairs Goal 1, PT) standby assist  -ST     Time Frame (Stairs Goal 1, PT) 1 week  -ST     Progress/Outcome (Stairs Goal 1, PT) new goal  -ST           User Key  (r) = Recorded By, (t) = Taken By, (c) = Cosigned By    Initials Name Provider Type    ST Louise Simms, PT Physical Therapist               Clinical Impression     Row Name 05/04/23 1007          Pain    Pretreatment Pain Rating 0/10 - no pain  -ST     Posttreatment Pain Rating 0/10 - no pain  -ST     Row Name 05/04/23 1007          Plan of Care Review    Plan of Care Reviewed With patient  -ST     Outcome Evaluation Pt is 68 y/o M admitted [facility] on [facility]/2/23 with dyspnea. At baseline pt is indep with mobility, has SPC and rwx. Lives with wife and children in tri level home. Pt currently demos SBA for bed mobility and transfers, CGA for ambulation up to 50' with SPC - lateral sway noted and constant reaching for furniture. Improved to SBA with use of rwx up to 25' - educated pt on importance of safety and using his rwx at home. Pt demos mobility below baseline and therefore would benefit from acute skilled PT to address functional mobility deficits. Anticipate d/c home with assist and OP PT for improved functional gains.  -ST     Row Name 05/04/23 1007          Therapy Assessment/Plan (PT)    Rehab Potential (PT) good, to achieve stated therapy goals  -ST     Criteria for Skilled Interventions Met (PT) yes  -ST     Therapy Frequency (PT) 5 times/wk  -ST     Predicted Duration of Therapy Intervention (PT) 1 week  -ST     Row Name 05/04/23 1007          Positioning and Restraints    Pre-Treatment Position in bed  -ST     Post Treatment Position bed  -ST     In Bed supine;call light within  reach;encouraged to call for assist;exit alarm on  -ST           User Key  (r) = Recorded By, (t) = Taken By, (c) = Cosigned By    Initials Name Provider Type    Louise Tong PT Physical Therapist               Outcome Measures     Row Name 05/04/23 1008          How much help from another person do you currently need...    Turning from your back to your side while in flat bed without using bedrails? 4  -ST     Moving from lying on back to sitting on the side of a flat bed without bedrails? 4  -ST     Moving to and from a bed to a chair (including a wheelchair)? 3  -ST     Standing up from a chair using your arms (e.g., wheelchair, bedside chair)? 4  -ST     Climbing 3-5 steps with a railing? 2  -ST     To walk in hospital room? 3  -ST     AM-PAC 6 Clicks Score (PT) 20  -ST     Highest level of mobility 6 --> Walked 10 steps or more  -ST     Row Name 05/04/23 1008          Functional Assessment    Outcome Measure Options AM-PAC 6 Clicks Basic Mobility (PT)  -ST           User Key  (r) = Recorded By, (t) = Taken By, (c) = Cosigned By    Initials Name Provider Type    Louise Tong PT Physical Therapist                             Physical Therapy Education     Title: PT OT SLP Therapies (In Progress)     Topic: Physical Therapy (In Progress)     Point: Mobility training (Done)     Learning Progress Summary           Patient Acceptance, E,TB, VU,NR by  at 5/4/2023 1009                   Point: Home exercise program (Not Started)     Learner Progress:  Not documented in this visit.          Point: Body mechanics (Done)     Learning Progress Summary           Patient Acceptance, E,TB, VU,NR by  at 5/4/2023 1009                   Point: Precautions (Not Started)     Learner Progress:  Not documented in this visit.                      User Key     Initials Effective Dates Name Provider Type Discipline     09/22/22 -  Louise Simms PT Physical Therapist PT              PT Recommendation  and Plan     Plan of Care Reviewed With: patient  Outcome Evaluation: Pt is 68 y/o M admitted to [facility] on 5/2/23 with dyspnea. At baseline pt is indep with mobility, has SPC and rwx. Lives with wife and children in tri level home. Pt currently demos SBA for bed mobility and transfers, CGA for ambulation up to 50' with SPC - lateral sway noted and constant reaching for furniture. Improved to SBA with use of rwx up to 25' - educated pt on importance of safety and using his rwx at home. Pt demos mobility below baseline and therefore would benefit from acute skilled PT to address functional mobility deficits. Anticipate d/c home with assist and OP PT for improved functional gains.     Time Calculation:    PT Charges     Row Name 05/04/23 1011             Time Calculation    Start Time 0933  -ST      Stop Time 0956  -ST      Time Calculation (min) 23 min  -ST      PT Received On 05/04/23  -ST      PT - Next Appointment 05/05/23  -ST      PT Goal Re-Cert Due Date 05/11/23  -ST         Time Calculation- PT    Total Timed Code Minutes- PT 13 minute(s)  -ST         Timed Charges    88352 - Gait Training Minutes  8  -ST      94375 - PT Therapeutic Activity Minutes 5  -ST         Total Minutes    Timed Charges Total Minutes 13  -ST       Total Minutes 13  -ST            User Key  (r) = Recorded By, (t) = Taken By, (c) = Cosigned By    Initials Name Provider Type    ST Louise Simms, PT Physical Therapist              Therapy Charges for Today     Code Description Service Date Service Provider Modifiers Qty    [id number] HC GAIT TRAINING EA 15 MIN 5/4/2023 Louise Simms, PT GP 1    [id number] HC PT EVAL MOD COMPLEXITY 2 5/4/2023 Louise Simms, PT GP 1          PT G-Codes  Outcome Measure Options: AM-PAC 6 Clicks Basic Mobility (PT)  AM-PAC 6 Clicks Score (PT): 20  PT Discharge Summary  Anticipated Discharge Disposition (PT): home with assist, home with outpatient therapy services    Louise Simms  PT  5/4/2023

## 2023-05-04 NOTE — PROGRESS NOTES
Name: Carlito Mo ADMIT: 2023   : 1955  PCP: Flornecia Caballero MD    MRN: [id number] LOS: 2 days   AGE/SEX: 67 y.o. male  ROOM: [facility]     Subjective   Subjective   No shortness of breath.  No PND.  No chest pain.  No palpitation.  No ankle edema.  Positive for occasional dry cough.  No hemoptysis.  No fever or chills.    Review of Systems  GI.  No abdominal pain or nausea or vomiting  .  No dysuria or hematuria.     Objective   Objective   Vital Signs  Temp:  [97.4 °F (36.3 °C)-99 °F (37.2 °C)] 97.4 °F (36.3 °C)  Heart Rate:  [59-70] 65  Resp:  [17-20] 18  BP: (123-136)/(69-73) 136/69  SpO2:  [93 %-98 %] 96 %  on  Flow (L/min):  [2] 2;   Device (Oxygen Therapy): room air    Intake/Output Summary (Last 24 hours) at 2023 0935  Last data filed at 2023 0906  Gross per 24 hour   Intake 560 ml   Output 1125 ml   Net -565 ml     Body mass index is 23.45 kg/m².      23  1149 23  0553 23  0613   Weight: 77.8 kg (171 lb 8.3 oz) 77.7 kg (171 lb 4.8 oz) 78.4 kg (172 lb 14.4 oz)     Physical Exam  General.  Middle-aged gentleman who appears older than stated age.  No respiratory distress.  Alert and oriented x3.  Eyes.  Left pupil larger than the right.  Both are reactive.  Intact extraocular musculature.  No pallor or jaundice  Oral cavity.  Moist mucous membrane.  Neck.  Supple.  No JVD.  No lymphadenopathy or thyromegaly.  Cardiovascular.  Sternotomy scar.  Regular rate and rhythm with occasional ectopic beats and  grade 2 systolic murmur  Chest.  Poor bilateral air entry with no added sounds   Abdomen.  Soft lax.  No tenderness.  No organomegaly.  No guarding or rebound  Extremities.  Resolved lower extremity edema.    .    Results Review:      Results from last 7 days   Lab Units 23  0328 23  0333 23  0720   SODIUM mmol/L 136 139 145   POTASSIUM mmol/L 5.3* 4.9 5.3*   CHLORIDE mmol/L 105 108* 114*   CO2 mmol/L 20.7* 21.0* 19.9*   BUN mg/dL 75* 71* 69*    CREATININE mg/dL 3.78* 3.43* 3.55*   GLUCOSE mg/dL 160* 49* 138*   CALCIUM mg/dL 8.3* 8.4* 9.3   AST (SGOT) U/L  --   --  27   ALT (SGPT) U/L  --   --  27     Estimated Creatinine Clearance: 21 mL/min (A) (by C-G formula based on SCr of 3.78 mg/dL (H)).  Results from last 7 days   Lab Units 05/03/23  0333   HEMOGLOBIN A1C % 6.60*     Results from last 7 days   Lab Units 05/04/23  0215 05/03/23  2105 05/03/23  1703 05/03/23  1359 05/03/23  1301 05/03/23  1240 05/03/23  1222 05/03/23  0937   GLUCOSE mg/dL 139* 151* 126 122 69* 64* 55* 137*     Results from last 7 days   Lab Units 05/02/23  0913 05/02/23  0720   HSTROP T ng/L 96* 98*     Results from last 7 days   Lab Units 05/02/23  0720   PROBNP pg/mL 8,501.0*               Invalid input(s):  PHOS        Invalid input(s): LDLCALC  Results from last 7 days   Lab Units 05/04/23  0328 05/03/23  0333 05/02/23  0720   WBC 10*3/mm3 5.15 6.09 6.28   HEMOGLOBIN g/dL 7.3* 7.8* 8.8*   HEMATOCRIT % 23.3* 24.4* 27.8*   PLATELETS 10*3/mm3 228 280 248   MCV fL 85.3 83.3 85.5   MCH pg 26.7 26.6 27.1   MCHC g/dL 31.3* 32.0 31.7   RDW % 13.9 13.9 14.1   RDW-SD fl 42.9 42.2 43.4   MPV fL 10.4 10.0 10.1   NEUTROPHIL % % 73.0 66.6 75.5   LYMPHOCYTE % % 12.6* 16.9* 11.9*   MONOCYTES % % 9.3 12.2* 8.6   EOSINOPHIL % % 4.5 3.6 3.3   BASOPHIL % % 0.2 0.2 0.2   IMM GRAN % % 0.4 0.5 0.5   NEUTROS ABS 10*3/mm3 3.76 4.06 4.74   LYMPHS ABS 10*3/mm3 0.65* 1.03 0.75   MONOS ABS 10*3/mm3 0.48 0.74 0.54   EOS ABS 10*3/mm3 0.23 0.22 0.21   BASOS ABS 10*3/mm3 0.01 0.01 0.01   IMMATURE GRANS (ABS) 10*3/mm3 0.02 0.03 0.03   NRBC /100 WBC 0.0 0.0 0.0     Results from last 7 days   Lab Units 05/02/23  0720   INR  1.23*   APTT seconds 28.7         Results from last 7 days   Lab Units 05/02/23  0720   PROCALCITONIN ng/mL 0.10                 Results from last 7 days   Lab Units 05/02/23  0722   ADENOVIRUS DETECTION BY PCR  Not Detected   CORONAVIRUS 229E  Not Detected   CORONAVIRUS HKU1  Not Detected    CORONAVIRUS NL63  Not Detected   CORONAVIRUS OC43  Not Detected   HUMAN METAPNEUMOVIRUS  Not Detected   HUMAN RHINOVIRUS/ENTEROVIRUS  Not Detected   INFLUENZA B PCR  Not Detected   PARAINFLUENZA 1  Not Detected   PARAINFLUENZA VIRUS 2  Not Detected   PARAINFLUENZA VIRUS 3  Not Detected   PARAINFLUENZA VIRUS 4  Not Detected   BORDETELLA PERTUSSIS PCR  Not Detected   CHLAMYDOPHILA PNEUMONIAE PCR  Not Detected   MYCOPLAMA PNEUMO PCR  Not Detected   INFLUENZA A PCR  Not Detected   RSV, PCR  Not Detected               Imaging:  Imaging Results (Last 24 Hours)     Procedure Component Value Units Date/Time    XR Chest PA & Lateral [[id number]] Collected: 05/03/23 1655     Updated: 05/03/23 1659    Narrative:      XR CHEST PA AND LATERAL-     HISTORY:  Follow-up pulmonary edema.     COMPARISON:  Chest radiograph 05/20/2023     FINDINGS:    3 views of the chest were obtained.  The cardiac silhouette is enlarged. There are postsurgical changes from  CABG. There is calcific aortic atherosclerosis. There is slightly  improved aeration of both lungs with residual right greater than left  bibasilar opacities and a moderate right pleural effusion. Continued  short-term imaging is recommended.  Sternotomy wires are present. There is multilevel degenerative disc  disease. There is calcific atherosclerosis in the visualized upper  abdomen.     This report was finalized on 5/3/2023 4:56 PM by Dr. Katrin Cui M.D.                I reviewed the patient's new clinical results / labs / tests / procedures      Assessment/Plan     Active Hospital Problems    Diagnosis  POA   • **Acute combined systolic and diastolic congestive heart failure [I50.41]  Yes   • Iron deficiency anemia [D50.9]  Yes   • History of stroke [Z86.73]  Not Applicable   • Paroxysmal atrial fibrillation [I48.0]  Yes   • Chronic anticoagulation [Z79.01]  Not Applicable   • CKD (chronic kidney disease) stage 4, GFR 15-29 ml/min [N18.4]  Yes   • Anemia, chronic disease  [D63.8]  Yes   • Essential hypertension [I10]  Yes   • Chronically elevated troponin [R77.8]  Yes   • YAW (obstructive sleep apnea) [G47.33]  Yes   • Pleural effusion [J90]  Yes   • Acquired hypothyroidism [E03.9]  Yes   • Type 2 diabetes mellitus, with long-term current use of insulin [E11.9, Z79.4]  Not Applicable      Resolved Hospital Problems   No resolved problems to display.       1.  Acute on chronic combined congestive heart failure exacerbation in a patient with a history of hypertension/coronary artery disease status post CABG/combined congestive heart failure.  Last 2D echo on 3/23 revealing an ejection fraction of 41 to 45% with grade 3 diastolic dysfunction right ventricular enlargement and pulmonary hypertension.  Improved blood pressure control..  In normal sinus rhythm with good rate control.  S/p IV diuresis and now on Demadex by nephrology..  Continue Eliquis/aspirin/Norvasc/hydralazine/Coreg (dose increased on admission)/Demadex.  This is mostly secondary to noncompliance with medications.  Troponin is elevated but negative delta.  No acute ischemic EKG changes.  Cardiology on board..  Repeat chest x-ray shows improvement.  Weaned off oxygen.  2.  Stage IV chronic renal failure/hyperkalemia/metabolic acidosis.  Hyperkalemia and metabolic acidosis are secondary to chronic renal failure.  Volume overload has improved with diuresis with negative fluid balance.  Hyperkalemia is mild and fluctuating. Improving acidosis on sodium bicarbonate.  Stable renal function.  Appreciate nephrology help.  Continue to monitor renal function and input and output.    Will give a single dose of Lokelma  3.  COPD.  Not in exacerbation.  Continue Symbicort and Spiriva and as needed DuoNebs.  4.  Hypothyroidism.  Normal TSH 2 months ago.  Continue current replacement  5.  Dyslipidemia/TIA.  Continue Crestor and aspirin.  6.  Type 2 diabetes.  A1c is 6.6.  Currently on Glucomander.  Improved hypoglycemia with  adjustment.    7.  Chronic disease anemia/iron deficiency anemia.  No active bleed.  Plan IV iron and p.o. iron.  GI follow-up as an outpatient  8..  VTE prophylaxis.  Patient anticoagulated.      Discussed my findings and plan of treatment with the patient/nurses at multidisciplinary rounds.  Disposition.  Hopefully home tomorrow if stable and blood test acceptable      Smith Henson MD  [facility]  05/04/23  09:35 EDT

## 2023-05-04 NOTE — PROGRESS NOTES
LOS: 2 days   Patient Care Team:  Florencia Caballero MD as PCP - General (Internal Medicine)  Amber Murguia MD as Consulting Physician (Cardiology)  [facility] as Pharmacist  Seth Ladd MD as Surgeon (General Surgery)  Kim Hoyos MD PhD as Consulting Physician (Hematology and Oncology)  Jerome Diallo MD as Referring Physician (Family Medicine)  Jose Enrique Louie MD as Consulting Physician (Gastroenterology)  Nima Huddleston MD as Consulting Physician (Nephrology)    Chief Complaint:   CHF    Interval History:     He reports his breathing is improved.  Resting in bed comfortably this morning on room air.    Objective   Vital Signs  Temp:  [97.8 °F (36.6 °C)-99 °F (37.2 °C)] 99 °F (37.2 °C)  Heart Rate:  [59-70] 66  Resp:  [16-20] 20  BP: (123-132)/(67-73) 126/69    Intake/Output Summary (Last 24 hours) at 5/4/2023 0723  Last data filed at 5/4/2023 0510  Gross per 24 hour   Intake 380 ml   Output 925 ml   Net -545 ml       Comfortable NAD, resting in bed  PERRL, conjunctivae clear  Neck supple, no JVD or thyromegaly appreciated  S1/S2 RRR, no m/r/g  Lungs CTA B, normal effort  Abdomen S/NT/ND (+) BS, no HSM appreciated  Extremities warm, no clubbing, cyanosis, or edema  Moves all 4 extremities without gross deficits  No visible or palpable skin lesions  A/Ox4, mood and affect appropriate    Results Review:      Results from last 7 days   Lab Units 05/04/23  0328 05/03/23  0333 05/02/23  0720   SODIUM mmol/L 136 139 145   POTASSIUM mmol/L 5.3* 4.9 5.3*   CHLORIDE mmol/L 105 108* 114*   CO2 mmol/L 20.7* 21.0* 19.9*   BUN mg/dL 75* 71* 69*   CREATININE mg/dL 3.78* 3.43* 3.55*   GLUCOSE mg/dL 160* 49* 138*   CALCIUM mg/dL 8.3* 8.4* 9.3     Results from last 7 days   Lab Units 05/02/23  0913 05/02/23  0720   HSTROP T ng/L 96* 98*     Results from last 7 days   Lab Units 05/04/23  0328 05/03/23  0333 05/02/23  0720   WBC 10*3/mm3 5.15 6.09 6.28   HEMOGLOBIN g/dL 7.3* 7.8* 8.8*    HEMATOCRIT % 23.3* 24.4* 27.8*   PLATELETS 10*3/mm3 228 280 248     Results from last 7 days   Lab Units 05/02/23  0720   INR  1.23*   APTT seconds 28.7                   I reviewed the patient's new clinical results.  I personally viewed and interpreted the patient's EKG/Telemetry data        Medication Review:   amLODIPine, 5 mg, Oral, Daily  apixaban, 5 mg, Oral, Q12H  aspirin, 81 mg, Oral, Daily  budesonide-formoterol, 2 puff, Inhalation, BID - RT  buPROPion XL, 150 mg, Oral, Daily  carvedilol, 6.25 mg, Oral, BID With Meals  hydrALAZINE, 25 mg, Oral, TID  insulin glargine, 1-200 Units, Subcutaneous, Nightly - Glucommander  insulin lispro, 1-200 Units, Subcutaneous, 4x Daily With Meals & Nightly  levothyroxine, 75 mcg, Oral, Q AM  rosuvastatin, 10 mg, Oral, Daily  sodium bicarbonate, 650 mg, Oral, TID  tamsulosin, 0.4 mg, Oral, Daily  tiotropium bromide monohydrate, 2 puff, Inhalation, Daily - RT             Assessment & Plan       Acute combined systolic and diastolic congestive heart failure    Type 2 diabetes mellitus, with long-term current use of insulin    Acquired hypothyroidism    Pleural effusion    YAW (obstructive sleep apnea)    Chronically elevated troponin    Essential hypertension    Anemia, chronic disease    CKD (chronic kidney disease) stage 4, GFR 15-29 ml/min    Paroxysmal atrial fibrillation    Chronic anticoagulation    History of stroke    1.  Acute on chronic systolic/diastolic heart failure: EF 41 to 45%, grade 3 diastolic dysfunction. May have missed diuretic a couple times.  CXR shows moderate pleural effusion and vascular congestion.  Elevated BNP.  Nephrology dosing diuretics.   Fluid and sodium restriction.    Clinically feels better.  Nephrology dosing torsemide    2.  Acute kidney injury on chronic kidney disease stage IV: Nephrology following.  Dosing diuretics    3.  Anemia: Hemoglobin 7.8.  Likely secondary to chronic kidney disease  4.  Hypertension: Elevated on admission.   Now controlled  5.  Paroxysmal atrial fibrillation: Anticoagulation with Eliquis. SR on tele.   6.  History of stroke  7.  Recurrent pleural effusion: s/p right thoracentesis 04/14/2023 with 1.8L fluid removed  8.  Chronically elevated high-sensitivity troponin: Secondary to fluid overload    Thank you for the consult, I do not suspect an active cardiac process, main issue now is fluid balance related to his CHF and CKD.  Greatly appreciate nephrology assistance with diuretics.    Mariano Garcia MD  05/04/23  07:23 EDT      Part of this note may be an electronic transcription/translation of spoken language to printed text using the Dragon Dictation System.

## 2023-05-04 NOTE — PLAN OF CARE
Goal Outcome Evaluation:  Plan of Care Reviewed With: patient    Pt is 66 y/o M admitted to [facility] on 5/2/23 with dyspnea. At baseline pt is indep with mobility, has SPC and rwx. Lives with wife and children in tri level home. Pt currently demos SBA for bed mobility and transfers, CGA for ambulation up to 50' with SPC - lateral sway noted and constant reaching for furniture. Improved to SBA with use of rwx up to 25' - educated pt on importance of safety and using his rwx at home. Pt demos mobility below baseline and therefore would benefit from acute skilled PT to address functional mobility deficits. Anticipate d/c home with assist and OP PT for improved functional gains.

## 2023-05-05 ENCOUNTER — READMISSION MANAGEMENT (OUTPATIENT)
Dept: CALL CENTER | Facility: HOSPITAL | Age: 68
End: 2023-05-05
Payer: MEDICARE

## 2023-05-05 VITALS
WEIGHT: 173.2 LBS | OXYGEN SATURATION: 92 % | HEART RATE: 64 BPM | SYSTOLIC BLOOD PRESSURE: 112 MMHG | HEIGHT: 72 IN | BODY MASS INDEX: 23.46 KG/M2 | DIASTOLIC BLOOD PRESSURE: 61 MMHG | RESPIRATION RATE: 18 BRPM | TEMPERATURE: 97.6 F

## 2023-05-05 PROBLEM — I50.21 ACUTE HFREF (HEART FAILURE WITH REDUCED EJECTION FRACTION): Status: ACTIVE | Noted: 2023-05-05

## 2023-05-05 LAB
ALBUMIN SERPL-MCNC: 3.2 G/DL (ref 3.5–5.2)
ANION GAP SERPL CALCULATED.3IONS-SCNC: 12.5 MMOL/L (ref 5–15)
BASOPHILS # BLD AUTO: 0.01 10*3/MM3 (ref 0–0.2)
BASOPHILS NFR BLD AUTO: 0.2 % (ref 0–1.5)
BUN SERPL-MCNC: 75 MG/DL (ref 8–23)
BUN/CREAT SERPL: 19.3 (ref 7–25)
CALCIUM SPEC-SCNC: 8.5 MG/DL (ref 8.6–10.5)
CHLORIDE SERPL-SCNC: 103 MMOL/L (ref 98–107)
CO2 SERPL-SCNC: 22.5 MMOL/L (ref 22–29)
CREAT SERPL-MCNC: 3.88 MG/DL (ref 0.76–1.27)
DEPRECATED RDW RBC AUTO: 44.4 FL (ref 37–54)
EGFRCR SERPLBLD CKD-EPI 2021: 16.2 ML/MIN/1.73
EOSINOPHIL # BLD AUTO: 0.24 10*3/MM3 (ref 0–0.4)
EOSINOPHIL NFR BLD AUTO: 5.1 % (ref 0.3–6.2)
ERYTHROCYTE [DISTWIDTH] IN BLOOD BY AUTOMATED COUNT: 14 % (ref 12.3–15.4)
GLUCOSE BLDC GLUCOMTR-MCNC: 261 MG/DL (ref 70–130)
GLUCOSE BLDC GLUCOMTR-MCNC: 262 MG/DL (ref 70–130)
GLUCOSE SERPL-MCNC: 73 MG/DL (ref 65–99)
HCT VFR BLD AUTO: 24.3 % (ref 37.5–51)
HGB BLD-MCNC: 7.7 G/DL (ref 13–17.7)
IMM GRANULOCYTES # BLD AUTO: 0.01 10*3/MM3 (ref 0–0.05)
IMM GRANULOCYTES NFR BLD AUTO: 0.2 % (ref 0–0.5)
LYMPHOCYTES # BLD AUTO: 0.66 10*3/MM3 (ref 0.7–3.1)
LYMPHOCYTES NFR BLD AUTO: 14.1 % (ref 19.6–45.3)
MCH RBC QN AUTO: 27.3 PG (ref 26.6–33)
MCHC RBC AUTO-ENTMCNC: 31.7 G/DL (ref 31.5–35.7)
MCV RBC AUTO: 86.2 FL (ref 79–97)
MONOCYTES # BLD AUTO: 0.51 10*3/MM3 (ref 0.1–0.9)
MONOCYTES NFR BLD AUTO: 10.9 % (ref 5–12)
MYCOBACTERIUM SPEC CULT: NORMAL
NEUTROPHILS NFR BLD AUTO: 3.24 10*3/MM3 (ref 1.7–7)
NEUTROPHILS NFR BLD AUTO: 69.5 % (ref 42.7–76)
NIGHT BLUE STAIN TISS: NORMAL
NRBC BLD AUTO-RTO: 0 /100 WBC (ref 0–0.2)
PHOSPHATE SERPL-MCNC: 4.8 MG/DL (ref 2.5–4.5)
PLATELET # BLD AUTO: 219 10*3/MM3 (ref 140–450)
PMV BLD AUTO: 10.3 FL (ref 6–12)
POTASSIUM SERPL-SCNC: 4.9 MMOL/L (ref 3.5–5.2)
RBC # BLD AUTO: 2.82 10*6/MM3 (ref 4.14–5.8)
SODIUM SERPL-SCNC: 138 MMOL/L (ref 136–145)
WBC NRBC COR # BLD: 4.67 10*3/MM3 (ref 3.4–10.8)

## 2023-05-05 PROCEDURE — 80069 RENAL FUNCTION PANEL: CPT | Performed by: HOSPITALIST

## 2023-05-05 PROCEDURE — G0378 HOSPITAL OBSERVATION PER HR: HCPCS

## 2023-05-05 PROCEDURE — 82948 REAGENT STRIP/BLOOD GLUCOSE: CPT

## 2023-05-05 PROCEDURE — 94799 UNLISTED PULMONARY SVC/PX: CPT

## 2023-05-05 PROCEDURE — 85025 COMPLETE CBC W/AUTO DIFF WBC: CPT | Performed by: INTERNAL MEDICINE

## 2023-05-05 PROCEDURE — 94664 DEMO&/EVAL PT USE INHALER: CPT

## 2023-05-05 PROCEDURE — 94761 N-INVAS EAR/PLS OXIMETRY MLT: CPT

## 2023-05-05 RX ORDER — SODIUM BICARBONATE 650 MG/1
650 TABLET ORAL 3 TIMES DAILY
Qty: 90 TABLET | Refills: 3 | Status: SHIPPED | OUTPATIENT
Start: 2023-05-05

## 2023-05-05 RX ORDER — CARVEDILOL 6.25 MG/1
6.25 TABLET ORAL 2 TIMES DAILY WITH MEALS
Qty: 60 TABLET | Refills: 3 | Status: SHIPPED | OUTPATIENT
Start: 2023-05-05

## 2023-05-05 RX ORDER — FERROUS SULFATE 325(65) MG
325 TABLET ORAL
Qty: 30 TABLET | Refills: 3 | Status: SHIPPED | OUTPATIENT
Start: 2023-05-05

## 2023-05-05 RX ORDER — TORSEMIDE 100 MG/1
100 TABLET ORAL DAILY
Qty: 30 TABLET | Refills: 3 | Status: SHIPPED | OUTPATIENT
Start: 2023-05-05

## 2023-05-05 RX ORDER — BUDESONIDE AND FORMOTEROL FUMARATE DIHYDRATE 160; 4.5 UG/1; UG/1
2 AEROSOL RESPIRATORY (INHALATION)
Qty: 1 EACH | Refills: 3 | Status: SHIPPED | OUTPATIENT
Start: 2023-05-05

## 2023-05-05 RX ADMIN — BUPROPION HYDROCHLORIDE 150 MG: 150 TABLET, EXTENDED RELEASE ORAL at 09:56

## 2023-05-05 RX ADMIN — TAMSULOSIN HYDROCHLORIDE 0.4 MG: 0.4 CAPSULE ORAL at 09:56

## 2023-05-05 RX ADMIN — ASPIRIN 81 MG: 81 TABLET, COATED ORAL at 09:56

## 2023-05-05 RX ADMIN — FERROUS SULFATE TAB 325 MG (65 MG ELEMENTAL FE) 325 MG: 325 (65 FE) TAB at 09:56

## 2023-05-05 RX ADMIN — APIXABAN 5 MG: 5 TABLET, FILM COATED ORAL at 09:52

## 2023-05-05 RX ADMIN — CARVEDILOL 6.25 MG: 6.25 TABLET, FILM COATED ORAL at 09:56

## 2023-05-05 RX ADMIN — BUDESONIDE AND FORMOTEROL FUMARATE DIHYDRATE 2 PUFF: 160; 4.5 AEROSOL RESPIRATORY (INHALATION) at 10:00

## 2023-05-05 RX ADMIN — SODIUM BICARBONATE 650 MG: 650 TABLET ORAL at 09:56

## 2023-05-05 RX ADMIN — HYDRALAZINE HYDROCHLORIDE 25 MG: 25 TABLET, FILM COATED ORAL at 09:56

## 2023-05-05 RX ADMIN — ROSUVASTATIN CALCIUM 10 MG: 10 TABLET, FILM COATED ORAL at 09:56

## 2023-05-05 RX ADMIN — AMLODIPINE BESYLATE 5 MG: 5 TABLET ORAL at 09:56

## 2023-05-05 RX ADMIN — TIOTROPIUM BROMIDE INHALATION SPRAY 2 PUFF: 3.12 SPRAY, METERED RESPIRATORY (INHALATION) at 09:59

## 2023-05-05 RX ADMIN — TORSEMIDE 100 MG: 100 TABLET ORAL at 09:56

## 2023-05-05 RX ADMIN — LEVOTHYROXINE SODIUM 75 MCG: 0.07 TABLET ORAL at 06:25

## 2023-05-05 RX ADMIN — ACETAMINOPHEN 650 MG: 325 TABLET, FILM COATED ORAL at 13:12

## 2023-05-05 NOTE — PROGRESS NOTES
"Enter Query Response Below      Query Response: Stage IV chronic renal failure with no evidence of acute kidney failure             If applicable, please update the problem list.       Patient: Carlito Mo        : 1955  Account: [id number]           Admit Date:         How to Respond to this query:       a. Click New Note     b. Answer query within the yellow box.                c. Update the Problem List, if applicable.      If you have any questions about this query contact me at: [email or web address]     Dr. Henson,    67 yr. old male admitted with acute on chronic combined systolic and diastolic heart failure. Your notes document chronic kidney disease stage 4. Nephrology & Cardiology notes document \"Acute kidney injury on top of chronic kidney disease stage IV secondary to diabetic and hypertensive nephrosclerosis.\" The patient's creatinine on admission was 3.55 then 3.43, 3.78 and 3.88. The patient was treated with Lasix IV to Bumex IV then Demadex PO and monitoring labs.     Please clarify the appropriate diagnosis for this patient as:    - Chronic kidney disease stage 4 with acute kidney injury  - Chronic kidney disease stage 4 without acute kidney injury  - Other (please specify) ________________  - Unable to determine    KDIGO criteria for acute kidney injury:    [employer] KDIGO conference defines SYLVAIN as any of the following:     -Increase in creatinine level to > 1.5x baseline (historical or measure), which is known or presumed to have occurred within the prior 7 days   OR  -Increase in creatinine > 0.3 mg/dl from a measured baseline within 48 hours or less   OR  -Urine output < 0.5 ml/kg/hr for 6 hrs    When baseline creatinine is unknown, KDIGO advises: The lowest SCr obtained during a hospitalization is usually equal to or greater than the baseline. This SCr should be used to diagnose (and stage) SYLVAIN.    By submitting this query, we are merely seeking further " clarification of documentation to accurately reflect all conditions that you are monitoring, evaluating, treating or that extend the hospitalization or utilize additional resources of care. Please utilize your independent clinical judgment when addressing the question(s) above.     This query and your response, once completed, will be entered into the legal medical record.    Sincerely,  Elena Langston RN  Clinical Documentation Integrity Program

## 2023-05-05 NOTE — DISCHARGE SUMMARY
Patient Name: Carlito Mo  : 1955  MRN: [id number]    Date of Admission: 2023  Date of Discharge:  2023  Primary Care Physician: Florencia Caballero MD      Discharge Diagnoses     Active Hospital Problems    Diagnosis  POA   • **Acute combined systolic and diastolic congestive heart failure [I50.41]  Yes   • Acute HFrEF (heart failure with reduced ejection fraction) [I50.21]  Yes   • Iron deficiency anemia [D50.9]  Yes   • History of stroke [Z86.73]  Not Applicable   • Paroxysmal atrial fibrillation [I48.0]  Yes   • Chronic anticoagulation [Z79.01]  Not Applicable   • CKD (chronic kidney disease) stage 4, GFR 15-29 ml/min [N18.4]  Yes   • Anemia, chronic disease [D63.8]  Yes   • Essential hypertension [I10]  Yes   • Chronically elevated troponin [R77.8]  Yes   • YAW (obstructive sleep apnea) [G47.33]  Yes   • Pleural effusion [J90]  Yes   • Acquired hypothyroidism [E03.9]  Yes   • Type 2 diabetes mellitus, with long-term current use of insulin [E11.9, Z79.4]  Not Applicable      Resolved Hospital Problems   No resolved problems to display.        Hospital Course     Brief admission history and physical.  Please refer to the H&P for full details.  A 67 years old gentleman with a past history of coronary artery disease/hypertension/combined systolic and diastolic congestive heart failure/A-fib/anemia chronic disease/stage IV chronic renal failure/hypothyroidism/COPD/pleural effusion/type 2 diabetes who presents to the emergency department with progressive shortness of breath over the last 24 hours.  Reports of noncompliance with medications.  Positive chest discomfort.  Positive lower extremity edema.  His physical examination is remarkable for a temperature of 97.8 a pulse of 86 respirate rate of 20 and blood pressure 173/98 and O2 sats of 94% on 2 L.  Rest of the examination is remarkable for mildly respiratory distress/sternotomy scar/grade 2 systolic murmur/poor bilateral air entry with  bibasilar fine crackles and rhonchi.  Hospital course.  Initial ER evaluation included CBC that was normal except a hemoglobin of 8.8.  Procalcitonin normal.  Troponin x2 elevated but negative delta.  proBNP was elevated.  PTT normal.  INR was elevated at 1.23.  CMP normal except random blood sugar of 138, BUN 69, creatinine 3.5, potassium 5.3, chloride 119, CO2 of 19.9, GFR of 18.  Respiratory PCR is negative.  EKG with normal sinus rhythm right bundle branch block and prolonged NH interval and anterior septal MI of undetermined age.  Chest x-ray with pulmonary edema and pulmonary effusions.  Patient was diagnosed with an acute on chronic combined congestive heart failure exacerbation in a patient with a history of hypertension/coronary artery disease status post CABG/combined congestive heart failure.  Last 2D echo on 3/23 revealing an ejection fraction between 41 to 45% with grade 3 diastolic dysfunction and pulmonary hypertension.  Blood pressure was uncontrolled on admission and his Coreg was increased.  On admission he was started on IV diuretics that subsequently was switched by nephrology to IV Bumex and eventually he was placed on p.o. Demadex after his volume overload improved.  We continued his Eliquis/aspirin/Norvasc/hydralazine and the new dose of Coreg and new diuretic in the form of Demadex.  It was thought that noncompliance is the cause of the heart failure exacerbation.  Troponins were elevated but delta was negative.  There was no acute ischemic EKG changes.  Cardiology and nephrology were consulted.  Repeat chest x-ray showed improvement.  Neither cardiology or nephrology recommended any thoracocentesis.  Patient weaned off oxygen.  He was counseled to comply with his medication.  Patient with stage IV chronic renal failure and hyperkalemia and metabolic acidosis.  Hyperkalemia and metabolic acidosis were secondary to the renal failure.  Volume overload has improved with the diuresis.   Hyperkalemia has resolved with improving acidosis on sodium bicarbonate.  Renal function remained stable around the baseline.  He has received Lokelma intermittently for his hyperkalemia that has normalized by the time of discharge.  He does have a history of COPD and he remained on bronchodilators and at the time of discharge Combivent Respimat was resumed with addition of Symbicort.  Has a history of hypothyroidism and TSH was normal 2 months ago and will continue with his current replacement.  He has a history of dyslipidemia and TIA and we continued his aspirin and Crestor.  He has a history of type 2 diabetes and he reports that he was not taking his short acting insulin and A1c was 6.6 and he was placed on Glucomander during his hospitalization and resumed back on his home long-acting insulin.  He has anemia of chronic disease and iron deficiency there was no active bleed he is status post IV iron and we started him on p.o. iron and he might require GI follow-up as an outpatient and I will leave that to the primary MD.  At the time of discharge he no shortness of breath or chest pain and was hemodynamically stable.  Nephrology and hematology okayed the discharge.    Consultants     Consult Orders (all) (From admission, onward)     Start     Ordered    05/02/23 1218  Inpatient Case Management  Consult  Once        Provider:  (Not yet assigned)    05/02/23 1218    05/02/23 0927  Inpatient Cardiology Consult  Once        Specialty:  Cardiology  Provider:  Christiano Badillo MD    05/02/23 0926 05/02/23 0927  Inpatient Nephrology Consult  Once        Specialty:  Nephrology  Provider:  Nima Huddleston MD    05/02/23 0926 05/02/23 0847  [facility] (on-call MD unless specified) Details  Once        Specialty:  Hospitalist  Provider:  Smith Henson MD    05/02/23 0846              Procedures     Imaging Results (All)     Procedure Component Value Units Date/Time    XR Chest PA & Lateral [[id number]]  Collected: 05/03/23 1655     Updated: 05/03/23 1659    Narrative:      XR CHEST PA AND LATERAL-     HISTORY:  Follow-up pulmonary edema.     COMPARISON:  Chest radiograph 05/20/2023     FINDINGS:    3 views of the chest were obtained.  The cardiac silhouette is enlarged. There are postsurgical changes from  CABG. There is calcific aortic atherosclerosis. There is slightly  improved aeration of both lungs with residual right greater than left  bibasilar opacities and a moderate right pleural effusion. Continued  short-term imaging is recommended.  Sternotomy wires are present. There is multilevel degenerative disc  disease. There is calcific atherosclerosis in the visualized upper  abdomen.     This report was finalized on 5/3/2023 4:56 PM by Dr. Katrin Cui M.D.       XR Chest 1 View [[id number]] Collected: 05/02/23 0721     Updated: 05/02/23 0727    Narrative:      XR CHEST 1 VW-     HISTORY:  Dyspnea, COPD.     COMPARISON:  Chest radiograph 03/08/2023     FINDINGS:    A single view of the chest was obtained.  The cardiac silhouette is partially obscured. There are postsurgical  changes from CABG. There is calcific aortic atherosclerosis. There is  central pulmonary venous congestion with relatively diffuse patchy  airspace and interstitial opacities, right greater than left, as well as  more dense opacity at the lung bases, also right greater than left.  There is a moderate right pleural effusion. Findings are concerning for  pulmonary edema versus multifocal pneumonia in the appropriate clinical  setting, new from 03/8/2023, likely also with a component of  atelectasis. Short-term follow-up chest radiographs are recommended to  document resolution.  Median sternotomy wires are present.     This report was finalized on 5/2/2023 7:24 AM by Dr. Katrin Cui M.D.             Pertinent Labs     Results from last 7 days   Lab Units 05/05/23  0351 05/04/23  0328 05/03/23  0333 05/02/23  0720   WBC 10*3/mm3 4.67 5.15  6.09 6.28   HEMOGLOBIN g/dL 7.7* 7.3* 7.8* 8.8*   PLATELETS 10*3/mm3 219 228 280 248     Results from last 7 days   Lab Units 05/05/23 0351 05/04/23 0328 05/03/23 0333 05/02/23  0720   SODIUM mmol/L 138 136 139 145   POTASSIUM mmol/L 4.9 5.3* 4.9 5.3*   CHLORIDE mmol/L 103 105 108* 114*   CO2 mmol/L 22.5 20.7* 21.0* 19.9*   BUN mg/dL 75* 75* 71* 69*   CREATININE mg/dL 3.88* 3.78* 3.43* 3.55*   GLUCOSE mg/dL 73 160* 49* 138*   Estimated Creatinine Clearance: 20.5 mL/min (A) (by C-G formula based on SCr of 3.88 mg/dL (H)).  Results from last 7 days   Lab Units 05/05/23 0351 05/04/23 0328 05/03/23 0333 05/02/23  0720   ALBUMIN g/dL 3.2* 3.2* 3.5 4.0   BILIRUBIN mg/dL  --   --   --  0.3   ALK PHOS U/L  --   --   --  108   AST (SGOT) U/L  --   --   --  27   ALT (SGPT) U/L  --   --   --  27     Results from last 7 days   Lab Units 05/05/23 0351 05/04/23 0328 05/03/23 0333 05/02/23  0720   CALCIUM mg/dL 8.5* 8.3* 8.4* 9.3   ALBUMIN g/dL 3.2* 3.2* 3.5 4.0   PHOSPHORUS mg/dL 4.8* 4.2 4.4  --        Results from last 7 days   Lab Units 05/02/23  0913 05/02/23  0720   HSTROP T ng/L 96* 98*   PROBNP pg/mL  --  8,501.0*           Invalid input(s): LDLCALC      Imaging Results (Last 24 Hours)     ** No results found for the last 24 hours. **          Test Results Pending at Discharge         Discharge Exam   Physical Exam  Vitals.  Temperature 97.7 a pulse of 69 respiratory rate of 20 and blood pressure 127/57 O2 sats of 92% on room air  General.  Middle-aged gentleman who appears older than stated age.  No respiratory distress.  Alert and oriented x3.  Eyes.  Left pupil larger than the right.  Both are reactive.  Intact extraocular musculature.  No pallor or jaundice  Oral cavity.  Moist mucous membrane.  Neck.  Supple.  No JVD.  No lymphadenopathy or thyromegaly.  Cardiovascular.  Sternotomy scar.  Regular rate and rhythm with occasional ectopic beats and  grade 2 systolic murmur  Chest.  Poor bilateral air entry with  no added sounds   Abdomen.  Soft lax.  No tenderness.  No organomegaly.  No guarding or rebound  Extremities.    No clubbing/cyanosis/edema  Discharge Details        Discharge Medications      New Medications      Instructions Start Date   budesonide-formoterol 160-4.5 MCG/ACT inhaler  Commonly known as: SYMBICORT   2 puffs, Inhalation, 2 Times Daily - RT      ferrous sulfate 325 (65 FE) MG tablet   325 mg, Oral, Daily With Breakfast      sodium bicarbonate 650 MG tablet   650 mg, Oral, 3 Times Daily      torsemide 100 MG tablet  Commonly known as: DEMADEX   100 mg, Oral, Daily         Changes to Medications      Instructions Start Date   carvedilol 6.25 MG tablet  Commonly known as: COREG  What changed:   · medication strength  · how much to take   6.25 mg, Oral, 2 Times Daily With Meals         Continue These Medications      Instructions Start Date   acetaminophen 325 MG tablet  Commonly known as: TYLENOL   650 mg, Oral, Every 4 Hours PRN      amLODIPine 5 MG tablet  Commonly known as: NORVASC   5 mg, Oral, Daily      apixaban 5 MG tablet tablet  Commonly known as: ELIQUIS   5 mg, Oral, Every 12 Hours      aspirin 81 MG EC tablet   81 mg, Oral, Daily      Basaglar Tempo Pen 100 UNIT/ML solution pen-injector  Generic drug: Insulin Glargine w/ Trans Port   2 Units, Subcutaneous, Nightly      buPROPion  MG 24 hr tablet  Commonly known as: WELLBUTRIN XL   150 mg, Oral, Daily      famotidine 10 MG tablet  Commonly known as: PEPCID   10 mg, Oral, 2 Times Daily PRN      hydrALAZINE 25 MG tablet  Commonly known as: APRESOLINE   25 mg, Oral, 3 Times Daily      ipratropium-albuterol  MCG/ACT inhaler  Commonly known as: COMBIVENT RESPIMAT   1 puff, Inhalation, 4 Times Daily PRN      levothyroxine 75 MCG tablet  Commonly known as: SYNTHROID, LEVOTHROID   75 mcg, Oral, Daily      rosuvastatin 40 MG tablet  Commonly known as: CRESTOR   40 mg, Oral, Daily      tamsulosin 0.4 MG capsule 24 hr capsule  Commonly known  as: FLOMAX   1 capsule, Oral, Daily         Stop These Medications    Apidra 100 UNIT/ML injection  Generic drug: insulin glulisine     furosemide 20 MG tablet  Commonly known as: LASIX            Allergies   Allergen Reactions   • Prozac [Fluoxetine Hcl] Other (See Comments)     shaking         Discharge Disposition:  Condition: Stable    Diet:   Diet Order   Procedures   • Diet: Cardiac Diets, Diabetic Diets, Fluid Restriction (240 mL/tray) Diets; Healthy Heart (2-3 Na+); Consistent Carbohydrate; Other (Specify mL/day) (1200); Texture: Regular Texture (IDDSI 7); Fluid Consistency: Thin (IDDSI 0)       Activity:   Activity Instructions     Activity as Tolerated      Other Activity Instructions      Activity Instructions: Home health physical therapy to evaluate and treat    Up WIth Assist            Counseling : Comply with your medications.  No nonsteroidal anti-inflammatory medications    CODE STATUS:    Code Status and Medical Interventions:   Ordered at: 05/02/23 0926     Code Status (Patient has no pulse and is not breathing):    CPR (Attempt to Resuscitate)     Medical Interventions (Patient has pulse or is breathing):    Full Support       Future Appointments   Date Time Provider Department Center   5/30/2023 11:30 AM Nida Lopes APRN [facility]   6/22/2023 11:00 AM Kae Bunch APRN [facility]     Additional Instructions for the Follow-ups that You Need to Schedule     Ambulatory Referral to Home Health   As directed      Face to Face Visit Date: 5/5/2023    Follow-up provider for Plan of Care?: I treated the patient in an acute care facility and will not continue treatment after discharge.    Follow-up provider: CHINYERE GREER [[id number]]    Reason/Clinical Findings: COPD/congestive heart failure/deconditioning    Describe mobility limitations that make leaving home difficult: As above    Nursing/Therapeutic Services Requested: Physical Therapy    PT orders: Therapeutic exercise Gait  Training Transfer training Strengthening    Weight Bearing Status: As Tolerated    Frequency: 1 Week 1         Call MD With Problems / Concerns   As directed      Instructions: Call MD or return to ER if chest pain/palpitation/shortness of breath/fever or chills/nausea or vomiting/lower extremity edema    Order Comments: Instructions: Call MD or return to ER if chest pain/palpitation/shortness of breath/fever or chills/nausea or vomiting/lower extremity edema          Discharge Follow-up with PCP   As directed       Currently Documented PCP:    Florencia Caballero MD    PCP Phone Number:    [phone]     Follow Up Details: Primary MD.  1 week.  Acute on chronic combined congestive heart failure exacerbation/hypertension/coronary artery disease/stage IV chronic renal failure/hyperkalemia/metabolic acidosis/COPD/hypothyroidism/dyslipidemia/TIA/type 2 diabetes/anemia         Discharge Follow-up with Specified Provider: Cardiology; 2 Weeks   As directed      To: Cardiology    Follow Up: 2 Weeks    Follow Up Details: Acute on chronic combined congestive heart failure/hypertension/coronary artery disease         Discharge Follow-up with Specified Provider: Nephrology; 2 Weeks   As directed      To: Nephrology    Follow Up: 2 Weeks    Follow Up Details: Stage IV chronic renal failure/hyperkalemia/metabolic acidosis            Follow-up Information     Florencia Caballero MD .    Specialty: Internal Medicine  Why: Primary MD.  1 week.  Acute on chronic combined congestive heart failure exacerbation/hypertension/coronary artery disease/stage IV chronic renal failure/hyperkalemia/metabolic acidosis/COPD/hypothyroidism/dyslipidemia/TIA/type 2 diabetes/anemia  Contact information:  [address] [place] [address] [phone]                           Time Spent on Discharge:  Greater than 30 minutes      Smith Henson MD  [facility]  05/05/23  08:28 EDT

## 2023-05-05 NOTE — PLAN OF CARE
Goal Outcome Evaluation:  Plan of Care Reviewed With: patient           Outcome Evaluation: No acute changes overnight. VSS. Placed on 1L while sleeping, otherwise has been on RA. AM labs pending.

## 2023-05-05 NOTE — PROGRESS NOTES
[facility] Progress Note      Patient Name: Carlito Mo  : 1955  MRN: [id number]  Primary Care Physician:  Florencia Caballero MD  Date of admission: 2023    Subjective     Interval History:   The patient was seen and examined today for follow-up on  SYLVAIN       Review of Systems:   Was seen today on room air.  Urine output of 2.2 L last 24 hours.        Objective     Vitals:   Temp:  [97.4 °F (36.3 °C)-98.1 °F (36.7 °C)] 97.7 °F (36.5 °C)  Heart Rate:  [60-72] 70  Resp:  [18-20] 18  BP: (100-127)/(52-69) 127/57  Flow (L/min):  [1] 1    Intake/Output Summary (Last 24 hours) at 2023 1248  Last data filed at 2023 0759  Gross per 24 hour   Intake 476 ml   Output 2080 ml   Net -1604 ml       Physical Exam:    General Appearance: Mildly confused today  Skin: warm and dry  HEENT: oral mucosa normal, nonicteric sclera  Neck: supple, no JVD  Lungs: CTA  Heart: RRR, normal S1 and S2  Abdomen: soft, nontender, nondistended  : no palpable bladder  Extremities: trace  bilateral lower extremity edema, cyanosis or clubbing  Neuro: normal speech and mental status     Scheduled Meds:     amLODIPine, 5 mg, Oral, Daily  apixaban, 5 mg, Oral, Q12H  aspirin, 81 mg, Oral, Daily  budesonide-formoterol, 2 puff, Inhalation, BID - RT  buPROPion XL, 150 mg, Oral, Daily  carvedilol, 6.25 mg, Oral, BID With Meals  ferrous sulfate, 325 mg, Oral, Daily With Breakfast  hydrALAZINE, 25 mg, Oral, TID  insulin glargine, 1-200 Units, Subcutaneous, Nightly - Glucommander  insulin lispro, 1-200 Units, Subcutaneous, 4x Daily With Meals & Nightly  levothyroxine, 75 mcg, Oral, Q AM  rosuvastatin, 10 mg, Oral, Daily  sodium bicarbonate, 650 mg, Oral, TID  tamsulosin, 0.4 mg, Oral, Daily  tiotropium bromide monohydrate, 2 puff, Inhalation, Daily - RT  torsemide, 100 mg, Oral, Daily      IV Meds:        Results Reviewed:   I have personally reviewed the results from the time of this admission to 2023 12:48  EDT     Results from last 7 days   Lab Units 05/05/23  0351 05/04/23  0328 05/03/23  0333 05/02/23  0720   SODIUM mmol/L 138 136 139 145   POTASSIUM mmol/L 4.9 5.3* 4.9 5.3*   CHLORIDE mmol/L 103 105 108* 114*   CO2 mmol/L 22.5 20.7* 21.0* 19.9*   BUN mg/dL 75* 75* 71* 69*   CREATININE mg/dL 3.88* 3.78* 3.43* 3.55*   CALCIUM mg/dL 8.5* 8.3* 8.4* 9.3   BILIRUBIN mg/dL  --   --   --  0.3   ALK PHOS U/L  --   --   --  108   ALT (SGPT) U/L  --   --   --  27   AST (SGOT) U/L  --   --   --  27   GLUCOSE mg/dL 73 160* 49* 138*       Estimated Creatinine Clearance: 20.5 mL/min (A) (by C-G formula based on SCr of 3.88 mg/dL (H)).    Results from last 7 days   Lab Units 05/05/23  0351 05/04/23 0328 05/03/23  0333   PHOSPHORUS mg/dL 4.8* 4.2 4.4             Results from last 7 days   Lab Units 05/05/23  0351 05/04/23 0328 05/03/23  0333 05/02/23  0720   WBC 10*3/mm3 4.67 5.15 6.09 6.28   HEMOGLOBIN g/dL 7.7* 7.3* 7.8* 8.8*   PLATELETS 10*3/mm3 219 228 280 248       Results from last 7 days   Lab Units 05/02/23  0720   INR  1.23*       Assessment / Plan     ASSESSMENT:  1. Acute kidney injury on top of chronic kidney disease stage IV secondary to diabetic and hypertensive nephrosclerosis followed by Dr. Bryan Huddleston from our group.    Creatinine was up to 4 in March now at 3.78.  Etiology of acute kidney injury likely secondary to cardiorenal syndrome  2. Hypertension with CKD: Blood pressure is better controlled after diuresis.  3. Acute CHF exacerbation  4. History of systolic and diastolic heart failure with EF of 45% and grade 2 diastolic failure.  5. Type 2 diabetes mellitus with CKD  6. Mild hyperkalemia  7. Volume overload seems to be improving  8. Right pleural effusion status post thoracentesis  9. Anemia of CKD: Iron panel in favor of iron deficiency anemia.  Received 250 mg grams of iron gluconate.  10. Normal anion gap metabolic acidosis likely secondary to CKD        PLAN:  · His volume status is improving.   Continue torsemide at 100 mg daily  · Long discussion with patient about importance of fluid restriction.  · Okay to discharge patient home.  Arrange for follow-up in nephrology clinic in 1 to 2 weeks    Thank you for involving us in the care of Carlito MEHTA Chepe.  Please feel free to call with any questions.    Ayad Bangura MD  05/05/23  12:48 EDT    [facility]  [phone]    Parts of this note may be an electronic transcription/translation of spoken language to printed text using the Dragon dictation system.

## 2023-05-05 NOTE — PROGRESS NOTES
Enter Query Response Below      Query Response:     Heart failure due to hypertension and pulmonary hypertension           If applicable, please update the problem list.     Patient: Carlito Mo        : 1955  Account: [id number]           Admit Date: 2023        How to Respond to this query:       a. Click New Note     b. Answer query within the yellow box.                c. Update the Problem List, if applicable.      If you have any questions about this query contact me at: [email or web address]     Dr. Garcia,    Patient has a history of hypertension, pulmonary hypertension and chronic heart failure.  Patient’s home medications include Norvasc, Eliquis, Aspirin, Coreg, Lasix, Apresoline and Crestor.    Please clarify the underlying etiology of the patient’s chronic heart failure as due to one or more of the following:    - Heart failure due to hypertension  - Heart failure due to pulmonary hypertension  - Heart failure due to hypertension and pulmonary hypertension  - Other (please specify) __________________________  - Unable to clinically determine     By submitting this query, we are merely seeking further clarification of documentation to accurately reflect all conditions that you are monitoring, evaluating, treating or that extend the hospitalization or utilize additional resources of care. Please utilize your independent clinical judgment when addressing the question(s) above.     This query and your response, once completed, will be entered into the legal medical record.    Sincerely,  Elena Langston RN  Clinical Documentation Integrity Program

## 2023-05-05 NOTE — DISCHARGE PLACEMENT REQUEST
"Carlito Mo (67 y.o. Male)     Date of Birth   1955    Social Security Number       Address   [address] [address]    Home Phone   [phone]    MRN   [id number]       [facility]    Marital Status                               Admission Date   5/2/23    Admission Type   Emergency    Admitting Provider   Smith Henson MD    Attending Provider       Department, Room/Bed   [address], S416/1       Discharge Date   5/5/2023    Discharge Disposition   Home-Health Care [facility]    Discharge Destination                               Attending Provider: (none)   Allergies: Prozac [Fluoxetine Hcl]    Isolation: None   Infection: None   Code Status: CPR    Ht: 182.9 cm (72\")   Wt: 78.6 kg (173 lb 3.2 oz)    Admission Cmt: None   Principal Problem: Acute combined systolic and diastolic congestive heart failure [I50.41]                 Active Insurance as of 5/2/2023     Primary Coverage     Payor Plan Insurance Group Employer/Plan Group    ANTHEM MEDICARE REPLACEMENT ANTHEM MEDICARE ADVANTAGE [id number]     Payor Plan Address Payor Plan Phone Number Payor Plan Fax Number Effective Dates    [address] [phone]  2/1/2023 - None Entered    [facility] [address]       Subscriber Name Subscriber Birth Date Member ID       CARLITO MO 1955 [id number]           Secondary Coverage     Payor Plan Insurance Group Employer/Plan Group    [place]      Payor Plan Address Payor Plan Phone Number Payor Plan Fax Number Effective Dates    [address] [phone]  2/1/2022 - None Entered    [facility] [address]       Subscriber Name Subscriber Birth Date Member ID       CARLITO OM 1955 [id number]                 Emergency Contacts      (Rel.) Home Phone Work Phone Mobile Phone    Lissette Mo (Spouse) [phone] -- [phone]              "

## 2023-05-06 ENCOUNTER — HOME HEALTH ADMISSION (OUTPATIENT)
Dept: HOME HEALTH SERVICES | Facility: HOME HEALTHCARE | Age: 68
End: 2023-05-06
Payer: COMMERCIAL

## 2023-05-06 NOTE — OUTREACH NOTE
Prep Survey    Flowsheet Row Responses   [facility] facility patient discharged from? [place]   Is LACE score < 7 ? No   Eligibility Readm Mgmt   Discharge diagnosis A/C CHF   Does the patient have one of the following disease processes/diagnoses(primary or secondary)? CHF   Does the patient have Home health ordered? Yes   What is the Home health agency?  [facility]   Is there a DME ordered? No   Prep survey completed? Yes          Tracy PADILLA - Registered Nurse

## 2023-05-07 NOTE — CASE MANAGEMENT/SOCIAL WORK
Case Management Discharge Note      Final Note: DC home with [facility] on 5/5/23         Selected Continued Care - Discharged on 5/5/2023 Admission date: 5/2/2023 - Discharge disposition: Home-Health Care Svc    Destination    No services have been selected for the patient.              Durable Medical Equipment    No services have been selected for the patient.              Dialysis/Infusion    No services have been selected for the patient.              Home Medical Care Coordination complete.    Service Provider Selected Services Address Phone Fax Patient Preferred    [facility] [address] [place] [id number] [phone] --          Therapy    No services have been selected for the patient.              Community Resources    No services have been selected for the patient.              Community & DME    No services have been selected for the patient.                       Final Discharge Disposition Code: 06 - home with home health care

## 2023-05-09 ENCOUNTER — READMISSION MANAGEMENT (OUTPATIENT)
Dept: CALL CENTER | Facility: HOSPITAL | Age: 68
End: 2023-05-09
Payer: MEDICARE

## 2023-05-09 NOTE — OUTREACH NOTE
CHF Week 1 Survey    Flowsheet Row Responses   [facility] patient discharged from? [place]   Does the patient have one of the following disease processes/diagnoses(primary or secondary)? CHF   CHF Week 1 attempt successful? No   Unsuccessful attempts Attempt 1          Kristina Garcia Registered Nurse

## 2023-05-12 LAB
MYCOBACTERIUM SPEC CULT: NORMAL
NIGHT BLUE STAIN TISS: NORMAL

## 2023-05-16 ENCOUNTER — READMISSION MANAGEMENT (OUTPATIENT)
Dept: CALL CENTER | Facility: HOSPITAL | Age: 68
End: 2023-05-16
Payer: MEDICARE

## 2023-05-16 NOTE — OUTREACH NOTE
CHF Week 2 Survey    Flowsheet Row Responses   [facility] patient discharged from? [place]   Does the patient have one of the following disease processes/diagnoses(primary or secondary)? CHF   Week 2 attempt successful? Yes   Call start time 1039   Call end time 1046   Discharge diagnosis A/C CHF   Person spoke with today (if not patient) and relationship pt   Meds reviewed with patient/caregiver? Yes   Is the patient having any side effects they believe may be caused by any medication additions or changes? No   Does the patient have all medications ordered at discharge? Yes   Is the patient taking all medications as directed (includes completed medication regime)? Yes   Does the patient have a primary care provider?  Yes   Does the patient have an appointment with their PCP within 7 days of discharge? Yes   Has the patient kept scheduled appointments due by today? N/A   Comments Cardiologist 5/30/23   What is the Home health agency?  [facility]   Has home health visited the patient within 72 hours of discharge? Call prior to 72 hours   Pulse Ox monitoring None   Psychosocial issues? No   Did the patient receive a copy of their discharge instructions? Yes   Nursing interventions Reviewed instructions with patient   What is the patient's perception of their health status since discharge? Improving   Nursing interventions Nurse provided patient education   Is the patient able to teach back signs and symptoms of worsening condition? (i.e. weight gain, shortness of air, etc.) Yes   If the patient is a current smoker, are they able to teach back resources for cessation? Not a smoker   Is the patient/caregiver able to teach back the hierarchy of who to call/visit for symptoms/problems? PCP, Specialist, Home health nurse, Urgent Care, ED, 911 Yes   Is the patient able to teach back Heart Failure Zones? Yes   CHF Nursing Interventions Education provided on various zones   CHF Zone this Call Yellow Zone   Green Zone  Patient reports doing well, No new or worsening shortness of breath, Physical activity level is normal for you, No new swelling -  feet, ankles and legs look normal for you, Weight check stable, No chest pain   Green Zone Interventions Daily weight check, Meds as directed, Follow up visits planned, Low sodium diet   Yellow Zone Worsening shortness of breath with activity   Yellow Zone Interventions Consider contacting your doctor or health care team   CHF Week 2 call completed? Yes   Is the patient interested in additional calls from an ambulatory ?  NOTE:  applies to high risk patients requiring additional follow-up. No   Would this patient benefit from a Referral to [employer] Social Work? No   Wrap up additional comments Pt denies any weight gain, but is feeling more SOB since hospital dc. Pt advised to call cardiologist office to report SOB. Reviewed AVS/medication with pt. Pt advised to make a PCP fu appt, and pt verified cardiologist fu appt on 5/30/23.   Call end time 1046          Kristina PADILLA - Registered Nurse

## 2023-05-19 LAB
MYCOBACTERIUM SPEC CULT: NORMAL
NIGHT BLUE STAIN TISS: NORMAL

## 2023-05-23 ENCOUNTER — READMISSION MANAGEMENT (OUTPATIENT)
Dept: CALL CENTER | Facility: HOSPITAL | Age: 68
End: 2023-05-23
Payer: MEDICARE

## 2023-05-23 NOTE — OUTREACH NOTE
CHF Week 3 Survey    Flowsheet Row Responses   [facility] patient discharged from? [place]   Does the patient have one of the following disease processes/diagnoses(primary or secondary)? CHF   Week 3 attempt successful? No   Unsuccessful attempts Attempt 2          Eloina PADILLA - Registered Nurse

## 2023-05-26 LAB
MYCOBACTERIUM SPEC CULT: NORMAL
NIGHT BLUE STAIN TISS: NORMAL

## 2023-05-30 ENCOUNTER — TELEPHONE (OUTPATIENT)
Dept: CARDIOLOGY | Facility: CLINIC | Age: 68
End: 2023-05-30

## 2023-05-30 ENCOUNTER — OFFICE VISIT (OUTPATIENT)
Dept: CARDIOLOGY | Facility: CLINIC | Age: 68
End: 2023-05-30

## 2023-05-30 ENCOUNTER — HOSPITAL ENCOUNTER (OUTPATIENT)
Dept: GENERAL RADIOLOGY | Facility: HOSPITAL | Age: 68
Discharge: HOME | End: 2023-05-30
Admitting: NURSE PRACTITIONER

## 2023-05-30 VITALS
HEART RATE: 67 BPM | WEIGHT: 146 LBS | BODY MASS INDEX: 19.77 KG/M2 | OXYGEN SATURATION: 99 % | HEIGHT: 72 IN | SYSTOLIC BLOOD PRESSURE: 140 MMHG | DIASTOLIC BLOOD PRESSURE: 60 MMHG

## 2023-05-30 DIAGNOSIS — N18.4 CKD (CHRONIC KIDNEY DISEASE), STAGE IV: ICD-10-CM

## 2023-05-30 DIAGNOSIS — Z98.890 S/P THORACENTESIS: ICD-10-CM

## 2023-05-30 DIAGNOSIS — I50.43 ACUTE ON CHRONIC COMBINED SYSTOLIC AND DIASTOLIC CONGESTIVE HEART FAILURE: ICD-10-CM

## 2023-05-30 DIAGNOSIS — J90 RECURRENT RIGHT PLEURAL EFFUSION: Primary | ICD-10-CM

## 2023-05-30 DIAGNOSIS — J90 RECURRENT RIGHT PLEURAL EFFUSION: ICD-10-CM

## 2023-05-30 DIAGNOSIS — I50.32 CHRONIC DIASTOLIC HEART FAILURE: ICD-10-CM

## 2023-05-30 PROCEDURE — 71046 X-RAY EXAM CHEST 2 VIEWS: CPT

## 2023-05-30 RX ORDER — TORSEMIDE 100 MG/1
100 TABLET ORAL DAILY
Qty: 90 TABLET | Refills: 0 | Status: SHIPPED | OUTPATIENT
Start: 2023-05-30

## 2023-05-30 RX ORDER — CARVEDILOL 6.25 MG/1
6.25 TABLET ORAL 2 TIMES DAILY WITH MEALS
Qty: 60 TABLET | Refills: 3 | Status: SHIPPED | OUTPATIENT
Start: 2023-05-30

## 2023-05-30 NOTE — TELEPHONE ENCOUNTER
Left voicemail for Carlito Mo requesting callback.    Thank you,  Parvin HAMPTON RN  Triage Nurse [facility]   15:53 EDT

## 2023-05-30 NOTE — TELEPHONE ENCOUNTER
Reviewed results and recommendations with Carlito Mo.  Patient verbalized understanding of results and recommendations.    Scheduled patient for 6 week f/u per Nida's last office note.    Thank you,  Parvin HAMPTON RN  Triage Nurse [facility]   16:17 EDT

## 2023-05-30 NOTE — TELEPHONE ENCOUNTER
----- Message from JI Triplett sent at 5/30/2023  3:49 PM EDT -----  Please inform patient that his xray shows no increased pleural effusion. He just needs to continue taking his medications and torsemide as prescribed to keep fluid off.

## 2023-05-30 NOTE — PROGRESS NOTES
heDate of Office Visit: 23  Encounter Provider: JI Triplett  Place of Service: [facility]  Patient Name: Carlito Mo  :1955    Chief Complaint   Patient presents with   • Coronary artery disease involving native coronary artery of   • Acute on chronic combined systolic and diastolic congestive   • Essential hypertension   • Hyperlipidemia   • Hypertension   • Follow-up   :     HPI: Carlito Mo is a 68 y.o. male  with  CAD with MI and CABG in , HTN, HLD, type 2 diabetes, anemia, chronic kidney disease , Hx CVAs in 2017, and basel cell carcinoma.  His ECHO in 2017 [id number] mild LV hypertrophy, mild left atrial enlargement with negative bubble, normal LV systolic fx, and EF of 68%.  NEISHA ECHO in 2017 showed a lower EF of 45% with focal wall motion abnormalities primarliy in the septum and anterior wall, small patent foramen ovale, no significant valvular disease, or cardiac emboli source minus the small PFO. 24 hour Holter monitor on 12-15-17 showed a couple short bursts of SVT, but no sustained arrhythmias.  72 hour monitor in 2018 after a    Hospital visit Showed predominant NSR, rare PACs, 9 episodes of SVT peaking at 169, rare PVCs, and no VT.  A week later another ECHO calculated EF of 62.9 and normal LV function, grade 1 dysfunction, and mild MVR.     He was hospitalized May 2020 and diagnosed with new areas of cerebral infarction felt to be embolic.  Evidence of some chronic microhemorrhages.  Repeat ECHO  showed progressive LV fx decline with current EF of 31-35%, grade 3 LV dysfunction now, moderate to severe global hypokinesis.  Mildly dilated RV cavity, moderately reduced RV fx, milld MVR, small pericardial effusion (< 1cm), and left pleural effusion.    He received IV Lasix for acute heart failure and pulmonary edema.  He was transitioned to oral diuretic and medications were adjusted for new  cardiomyopathy.  He was started on spironolactone and hydralazine was stopped.  He was switched to carvedilol and lisinopril was increased.  Nephrology evaluated and felt his baseline creatinine was 2.3-2.6.  His creatinine was 2.00 at discharge with normal potassium.  He was discharged on Eliquis in addition to aspirin.        He presented to the emergency room December 2021 with nausea and vomiting.  He admitted to not taking his diuretic.  His blood pressure was elevated 178/102.  Chest x-ray showed pulmonary vascular congestion with bilateral pleural effusions.  Nephrology and cardiology were consulted.             Patient was hospitalized 2/3/2022 for hypertensive urgency.  At that time he was also found to be in exacerbation of heart failure and diuretics were added to his regimen.  Patient was evaluated by nephrology on 2/14/2022 where he was having significant edema.  At that time he was resumed on his diuretic regimen. He had increased weakness and falls. SBP 90s. meds were adjusted.      He was hospitalized with acute on chronic combined congestive heart failure and had an ejection fraction of 40-45% in March 2023.  He was felt not to be a candidate for cardiac cath due to advanced kidney disease and was treated with goal-directed medical therapy with carvedilol.  He was not on ACE/ARB or Arni or SGLT2 agent or Aldactone due to chronic kidney disease which is managed by nephrology.  He had a large right pleural effusion which required thoracentesis on 3/8/2023 and had 2 L drained.  He was treated with apixaban and aspirin due to history of CVA which were both resumed at discharge.      He then was readmitted in May 2023 with progressive shortness of breath and increased lower extremity edema.  He also reported noncompliance with multiple medications.  Chest x-ray showed pulmonary edema.  He was treated with IV diuretic and nephrology help to manage.  He had issues with hyperkalemia.  Carvedilol was  "increased and he was treated with Demadex.  He ultimately was switched from furosemide to torsemide 100 mg daily.    He presents today for reassessment.  He reports intermittent shortness of breath since being in the hospital but overall his breathing is better.  He has no lower extremity edema.  He tells me he is taking his medication \"to the best of his knowledge\".  He does not know the names but states he takes I\"15 tablets a day\". He has abdominal discomfort after urinating at times but this is not new.   Allergies   Allergen Reactions   • Prozac [Fluoxetine Hcl] Other (See Comments)     shaking           Family and social history reviewed.     ROS  All other systems were reviewed and are negative          Objective:     Vitals:    05/30/23 1339   BP: 140/60   BP Location: Left arm   Patient Position: Sitting   Pulse: 67   SpO2: 99%   Weight: 66.2 kg (146 lb)   Height: 182.9 cm (72\")     Body mass index is 19.8 kg/m².    PHYSICAL EXAM:  Pulmonary:      Breath sounds: Examination of the right-middle field reveals decreased breath sounds. Examination of the right-lower field reveals decreased breath sounds. Decreased breath sounds present.   Cardiovascular:      Normal rate. Irregularly irregular rhythm.      Comments: Right arm fistula with thrill and bruit          ECG 12 Lead    Date/Time: 5/30/2023 2:35 PM  Performed by: Nida Lopes APRN  Authorized by: Nida Lopes APRN   Comparison: compared with previous ECG   Similar to previous ECG  Rhythm: atrial fibrillation  Rate: normal  Conduction: right bundle branch block  QRS axis: left    Clinical impression: abnormal EKG              Current Outpatient Medications   Medication Sig Dispense Refill   • acetaminophen (TYLENOL) 325 MG tablet Take 2 tablets by mouth Every 4 (Four) Hours As Needed for Mild Pain.     • amLODIPine (NORVASC) 5 MG tablet Take 1 tablet by mouth Daily.     • apixaban (ELIQUIS) 5 MG tablet tablet Take 1 tablet by mouth Every 12 (Twelve) " Hours. Indications: Atrial Fibrillation 60 tablet 5   • aspirin 81 MG EC tablet Take 1 tablet by mouth Daily. 90 tablet 3   • budesonide-formoterol (SYMBICORT) 160-4.5 MCG/ACT inhaler Inhale 2 puffs 2 (Two) Times a Day. 1 each 3   • buPROPion XL (WELLBUTRIN XL) 150 MG 24 hr tablet Take 1 tablet by mouth Daily.     • carvedilol (COREG) 6.25 MG tablet Take 1 tablet by mouth 2 (Two) Times a Day With Meals. 60 tablet 3   • famotidine (PEPCID) 10 MG tablet Take 1 tablet by mouth 2 (Two) Times a Day As Needed for Heartburn.     • ferrous sulfate 325 (65 FE) MG tablet Take 1 tablet by mouth Daily With Breakfast. 30 tablet 3   • hydrALAZINE (APRESOLINE) 25 MG tablet Take 1 tablet by mouth 3 (Three) Times a Day.     • Insulin Glargine w/ Trans Port (Basaglar Tempo Pen) 100 UNIT/ML solution pen-injector Inject 2 Units under the skin into the appropriate area as directed Every Night.     • ipratropium-albuterol (COMBIVENT RESPIMAT)  MCG/ACT inhaler Inhale 1 puff 4 (Four) Times a Day As Needed for Wheezing.     • levothyroxine (SYNTHROID, LEVOTHROID) 75 MCG tablet Take 1 tablet by mouth Daily. 30 tablet 5   • rosuvastatin (CRESTOR) 40 MG tablet Take 1 tablet by mouth Daily.     • sodium bicarbonate 650 MG tablet Take 1 tablet by mouth 3 (Three) Times a Day. 90 tablet 3   • tamsulosin (FLOMAX) 0.4 MG capsule 24 hr capsule Take 1 capsule by mouth Daily.     • torsemide (DEMADEX) 100 MG tablet Take 1 tablet by mouth Daily. 90 tablet 0     No current facility-administered medications for this visit.     Assessment:       Diagnosis Plan   1. Recurrent right pleural effusion  XR Chest PA & Lateral      2. S/P thoracentesis  XR Chest PA & Lateral      3. Acute on chronic combined systolic and diastolic congestive heart failure  XR Chest PA & Lateral      4. CKD (chronic kidney disease), stage IV        5. Chronic diastolic heart failure             Orders Placed This Encounter   Procedures   • XR Chest PA & Lateral     Standing  Status:   Future     Standing Expiration Date:   5/30/2024     Order Specific Question:   Reason for Exam:     Answer:   follow up right pleural effusion, SOB   • ECG 12 Lead     This order was created via procedure documentation     Order Specific Question:   Release to patient     Answer:   Routine Release         Plan:         1.  68 y.o. male  with coronary artery disease, myocardial infarction and CABG in 2001. Last EF 45% 03/2023. No angina continue the same  2.  Chronic combined  congestive heart failure.   May 2021 EF 30-35%. Last EF 45% 03/2023.   Not on ACE/ARB/ ARNI/ SGLT2 or spironolactone due to renal function. Continue carvedilol 6.225 BID and torsemide 100 mg daily.   4.  Hypertension blood pressure appears stable continue coreg and hydralazine and norvasc  5. YAW -not treated.   6. Hx of CVA's on eliquis 5 mg BID and aspirin- denies bleeding  7. Hyperlipidemia continue rosuvastatin  8. T2DM - last A1c 6.60 in 03/2023  9. Anemia of chronic diseae - last hemoglobin stable  10.  Stage IV kidney disease follows with Dr. Huddleston- he has a right forearm fistula  11.Pulmonary hypertension  12. Large right pleural effusions status post thoracentesis 3/8/23 (2000 mL drained) and repeat thoracentesis 04/14/2023 with 1.8L removed.  Given decreased breath sounds, SOB and remaining right pleural effusion on last chest xray 5/3/2023, I will repeat 2 view xray to follow up       Follow up in 6 weeks.Call with questions or concerns.               It has been a pleasure to participate in this patient's care.      Thank you,  JI Triplett      **I used Dragon to dictate this note:**

## 2023-06-14 ENCOUNTER — APPOINTMENT (OUTPATIENT)
Dept: GENERAL RADIOLOGY | Facility: HOSPITAL | Age: 68
End: 2023-06-14
Payer: MEDICARE

## 2023-06-14 ENCOUNTER — HOSPITAL ENCOUNTER (EMERGENCY)
Facility: HOSPITAL | Age: 68
Discharge: HOME | End: 2023-06-14
Attending: EMERGENCY MEDICINE | Admitting: EMERGENCY MEDICINE
Payer: MEDICARE

## 2023-06-14 ENCOUNTER — APPOINTMENT (OUTPATIENT)
Dept: CT IMAGING | Facility: HOSPITAL | Age: 68
End: 2023-06-14
Payer: MEDICARE

## 2023-06-14 VITALS
DIASTOLIC BLOOD PRESSURE: 69 MMHG | OXYGEN SATURATION: 99 % | TEMPERATURE: 97.4 F | RESPIRATION RATE: 18 BRPM | HEART RATE: 69 BPM | SYSTOLIC BLOOD PRESSURE: 125 MMHG

## 2023-06-14 DIAGNOSIS — R06.00 DYSPNEA, UNSPECIFIED TYPE: Primary | ICD-10-CM

## 2023-06-14 DIAGNOSIS — Z79.01 ANTICOAGULATED: ICD-10-CM
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
## 2023-06-14 LAB
ALBUMIN SERPL-MCNC: 4.1 G/DL (ref 3.5–5.2)
ALBUMIN/GLOB SERPL: 1.5 G/DL
ALP SERPL-CCNC: 78 U/L (ref 39–117)
ALT SERPL W P-5'-P-CCNC: 32 U/L (ref 1–41)
ANION GAP SERPL CALCULATED.3IONS-SCNC: 16.3 MMOL/L (ref 5–15)
AST SERPL-CCNC: 23 U/L (ref 1–40)
BASOPHILS # BLD AUTO: 0.01 10*3/MM3 (ref 0–0.2)
BASOPHILS NFR BLD AUTO: 0.2 % (ref 0–1.5)
BILIRUB SERPL-MCNC: 0.3 MG/DL (ref 0–1.2)
BUN SERPL-MCNC: 86 MG/DL (ref 8–23)
BUN/CREAT SERPL: 25.3 (ref 7–25)
CALCIUM SPEC-SCNC: 9.7 MG/DL (ref 8.6–10.5)
CHLORIDE SERPL-SCNC: 96 MMOL/L (ref 98–107)
CO2 SERPL-SCNC: 23.7 MMOL/L (ref 22–29)
CREAT SERPL-MCNC: 3.4 MG/DL (ref 0.76–1.27)
DEPRECATED RDW RBC AUTO: 41.6 FL (ref 37–54)
EGFRCR SERPLBLD CKD-EPI 2021: 18.9 ML/MIN/1.73
EOSINOPHIL # BLD AUTO: 0.04 10*3/MM3 (ref 0–0.4)
EOSINOPHIL NFR BLD AUTO: 0.7 % (ref 0.3–6.2)
ERYTHROCYTE [DISTWIDTH] IN BLOOD BY AUTOMATED COUNT: 13.6 % (ref 12.3–15.4)
GEN 5 2HR TROPONIN T REFLEX: 115 NG/L
GLOBULIN UR ELPH-MCNC: 2.7 GM/DL
GLUCOSE SERPL-MCNC: 147 MG/DL (ref 65–99)
HCT VFR BLD AUTO: 31.8 % (ref 37.5–51)
HGB BLD-MCNC: 10.5 G/DL (ref 13–17.7)
IMM GRANULOCYTES # BLD AUTO: 0.02 10*3/MM3 (ref 0–0.05)
IMM GRANULOCYTES NFR BLD AUTO: 0.4 % (ref 0–0.5)
LYMPHOCYTES # BLD AUTO: 0.98 10*3/MM3 (ref 0.7–3.1)
LYMPHOCYTES NFR BLD AUTO: 18.1 % (ref 19.6–45.3)
MCH RBC QN AUTO: 27.5 PG (ref 26.6–33)
MCHC RBC AUTO-ENTMCNC: 33 G/DL (ref 31.5–35.7)
MCV RBC AUTO: 83.2 FL (ref 79–97)
MONOCYTES # BLD AUTO: 0.5 10*3/MM3 (ref 0.1–0.9)
MONOCYTES NFR BLD AUTO: 9.2 % (ref 5–12)
NEUTROPHILS NFR BLD AUTO: 3.87 10*3/MM3 (ref 1.7–7)
NEUTROPHILS NFR BLD AUTO: 71.4 % (ref 42.7–76)
NRBC BLD AUTO-RTO: 0 /100 WBC (ref 0–0.2)
NT-PROBNP SERPL-MCNC: 4227 PG/ML (ref 0–900)
PLATELET # BLD AUTO: 228 10*3/MM3 (ref 140–450)
PMV BLD AUTO: 11 FL (ref 6–12)
POTASSIUM SERPL-SCNC: 4 MMOL/L (ref 3.5–5.2)
PROT SERPL-MCNC: 6.8 G/DL (ref 6–8.5)
QT INTERVAL: 463 MS
RBC # BLD AUTO: 3.82 10*6/MM3 (ref 4.14–5.8)
SODIUM SERPL-SCNC: 136 MMOL/L (ref 136–145)
TROPONIN T DELTA: -12 NG/L
TROPONIN T SERPL HS-MCNC: 127 NG/L
WBC NRBC COR # BLD: 5.42 10*3/MM3 (ref 3.4–10.8)

## 2023-06-14 PROCEDURE — 93005 ELECTROCARDIOGRAM TRACING: CPT | Performed by: PHYSICIAN ASSISTANT

## 2023-06-14 PROCEDURE — 85025 COMPLETE CBC W/AUTO DIFF WBC: CPT | Performed by: PHYSICIAN ASSISTANT

## 2023-06-14 PROCEDURE — 70450 CT HEAD/BRAIN W/O DYE: CPT

## 2023-06-14 PROCEDURE — 71045 X-RAY EXAM CHEST 1 VIEW: CPT

## 2023-06-14 PROCEDURE — 93010 ELECTROCARDIOGRAM REPORT: CPT | Performed by: STUDENT IN AN ORGANIZED HEALTH CARE EDUCATION/TRAINING PROGRAM

## 2023-06-14 PROCEDURE — 36415 COLL VENOUS BLD VENIPUNCTURE: CPT

## 2023-06-14 PROCEDURE — 99284 EMERGENCY DEPT VISIT MOD MDM: CPT

## 2023-06-14 PROCEDURE — 80053 COMPREHEN METABOLIC PANEL: CPT | Performed by: PHYSICIAN ASSISTANT

## 2023-06-14 PROCEDURE — 84484 ASSAY OF TROPONIN QUANT: CPT | Performed by: PHYSICIAN ASSISTANT

## 2023-06-14 PROCEDURE — 83880 ASSAY OF NATRIURETIC PEPTIDE: CPT | Performed by: PHYSICIAN ASSISTANT

## 2023-06-14 NOTE — ED PROVIDER NOTES
EMERGENCY DEPARTMENT ENCOUNTER    Room Number:  07/07  Date seen:  6/14/2023  PCP: Florencia Caballero MD  Discussed/ obtained information from independent historians: patient      HPI:  Chief Complaint: fall, soa  A complete HPI/ROS/PMH/PSH/SH/FH are unobtainable due to: poor historian, dementia  Context: Carlito Mo is a 68 y.o. male with a history of COPD, CHF, CKD, diabetes who presents to the ED via EMS for further evaluation after mechanical fall this morning while he was walking to the bathroom at home.  He states he landed on his buttocks.  EMS was called to help him up and patient also complained of shortness of breath and was transported to the ER.  He is anticoagulated on Eliquis but denies striking his head as well as any headache nausea vomiting or vision changes, denies any neck or back pain, reports some mild soreness to his buttocks.  He denies any other injury complaints and specifically any neck or back pain, upper or lower extremity pain.  He does report shortness of breath, denies fevers chills cough congestion chest pain or lower extremity edema.      External (non-ED) record review: Patient mended 5/2/2023 to 5/5/2023 for acute CHF in the setting of noncompliance with medications, found to have lower extremity edema, required 2 L oxygen supplementation initially. He improved on IV diuretics, did require treatment for hyperkalemia and metabolic acidosis due to renal failure which improved at the time of discharge.      PAST MEDICAL HISTORY  Active Ambulatory Problems     Diagnosis Date Noted    Major depressive disorder with single episode, in partial remission     Other hyperlipidemia     Type 2 diabetes mellitus, with long-term current use of insulin     Vitamin D deficiency 12/17/2015    Erectile dysfunction 12/17/2015    Chronic fatigue 04/25/2016    Type 2 diabetes mellitus with ophthalmic complication 04/25/2016    Skin lesion of chest wall 06/20/2016    Slow transit constipation  09/01/2016    Benign prostatic hyperplasia with nocturia 12/20/2016    History of basal cell carcinoma 12/20/2016    High blood pressure associated with diabetes 12/20/2016    Acquired hypothyroidism 12/20/2017    Hypertensive urgency 02/16/2018    Recurrent strokes 02/20/2018    Noncompliance w/medication treatment due to intermit use of medication 05/04/2018    Urinary retention 09/20/2018    Pneumonia 09/21/2018    Acute on chronic combined systolic and diastolic congestive heart failure 09/21/2018    Acute respiratory failure with hypoxia 09/21/2018    Suspected sleep apnea 10/29/2018    Pleural effusion 12/01/2018    YAW (obstructive sleep apnea) 12/13/2018    Coronary artery disease involving native coronary artery of native heart without angina pectoris 04/18/2019    Chronically elevated troponin 07/02/2019    Colon cancer screening 10/30/2018    Enlarged lymph nodes 10/30/2018    Functional gait abnormality 10/30/2018    History of myocardial infarction 02/06/2019    Hospital discharge follow-up 05/31/2019    Insomnia 02/06/2019    Iron deficiency anemia 10/02/2018    Major depression, recurrent 10/16/2012    Anemia, chronic renal failure, stage 3 (moderate) 02/27/2020    Essential hypertension 03/10/2020    Adverse effect of iron and its compounds, initial encounter 05/23/2020    Acute on chronic renal insufficiency 05/25/2020    Debility 05/25/2020    Falls frequently 05/25/2020    Acute pain of right shoulder 05/27/2020    Acute on chronic anemia 05/25/2020    Heme positive stool 05/25/2020    Neurogenic orthostatic hypotension 05/30/2020    Anemia, chronic disease 05/25/2020    History of colon polyps 05/25/2020    Helicobacter pylori gastritis 06/04/2020    Esophagitis 06/10/2020    Sleep related hypoxia 07/23/2020    Embolic stroke 05/20/2021    Patent foramen ovale 05/22/2021    Pleural effusion, bilateral 05/22/2021    Cardiomyopathy 05/24/2021    Lower abdominal pain 12/13/2021    Diarrhea  12/14/2021    CKD (chronic kidney disease) stage 4, GFR 15-29 ml/min 12/16/2021    Hypertension not at goal 02/02/2022    Memory loss due to medical condition 02/08/2022    Generalized weakness 03/01/2022    ERRONEOUS ENCOUNTER--DISREGARD 03/09/2022    Weakness 06/03/2022    Paroxysmal atrial fibrillation 07/25/2022    Chronic anticoagulation 07/25/2022    Multiple falls 09/15/2022    Dehydration 09/16/2022    SYLVAIN (acute kidney injury) 09/16/2022    Acute ischemic stroke 09/17/2022    Acute combined systolic and diastolic congestive heart failure 05/02/2023    History of stroke 05/02/2023    Iron deficiency anemia 05/04/2023    Acute HFrEF (heart failure with reduced ejection fraction) 05/05/2023     Resolved Ambulatory Problems     Diagnosis Date Noted    Anemia     Arthritis     Diabetes mellitus out of control     Acute sinusitis     Accumulation of fluid in tissues 12/17/2015    Fatigue 12/17/2015    Cardiomyopathy, ischemic 12/17/2015    Acute appendicitis 03/02/2016    Atherosclerosis of coronary artery 10/16/2012    Altered mental status 11/30/2017    Hypertensive encephalopathy syndrome 04/30/2018    Hyperglycemia 05/04/2018    Screening for colorectal cancer 08/22/2018    Constipation 08/22/2018    Snoring 12/13/2018    Lower abdominal pain 05/27/2020    Hypertensive emergency 02/01/2022    DE LEON (dyspnea on exertion) 03/07/2023    Shortness of breath 03/08/2023     Past Medical History:   Diagnosis Date    Acute congestive heart failure     CAD (coronary artery disease)     Cancer     Chronic combined systolic and diastolic congestive heart failure     Chronic ischemic heart disease     Chronic kidney disease (CKD)     CKD (chronic kidney disease)     COPD (chronic obstructive pulmonary disease)     Depression     Diabetes mellitus type 2, uncontrolled, without complications     Diabetic neuropathy     Disease of thyroid gland     Elevated cholesterol     Frequent PVCs     GERD (gastroesophageal reflux  disease)     Heart attack     History of coronary artery disease     Hyperlipidemia     Hypertension     Hypertensive encephalopathy     Mental status change     NO DEVICE/RECALLED     Poor historian     RBBB (right bundle branch block)     Stroke     TIA (transient ischemic attack)     Type 2 diabetes mellitus          PAST SURGICAL HISTORY  Past Surgical History:   Procedure Laterality Date    APPENDECTOMY N/A 02/14/2016    Dr. Alexey Dodson    ARTERIOVENOUS FISTULA/SHUNT SURGERY Right 06/03/2022    Procedure: RIGHT FOREARM ARTERIOVENOUS FISTULA PLACEMENT RADIOCEPHALIC WITH CEPHALIC VEIN TRANSPOSITION;  Surgeon: Eliseo Willis MD;  Location: [facility] MAIN OR;  Service: Vascular;  Laterality: Right;    COLONOSCOPY      over 20 years ago.  no polyps     COLONOSCOPY N/A 09/18/2018    Procedure: COLONOSCOPY;  Surgeon: Jose Enrique Louie MD;  Location: [facility] ENDOSCOPY;  Service: Gastroenterology    COLONOSCOPY N/A 09/19/2018    IH, EH, polyps (TAs w/low grade dysplasia)    COLONOSCOPY N/A 06/01/2020    Procedure: COLONOSCOPY;  Surgeon: Jose Enrique Louie MD;  Location: [facility] ENDOSCOPY;  Service: Gastroenterology;  Laterality: N/A;  Pre op-Anemia, Melena, History of Polyps  Post op-To Cecum/TI, Polyp, Poor Prep    CORONARY ARTERY BYPASS GRAFT  11/2001    ENDOSCOPY N/A 05/29/2020    Procedure: ESOPHAGOGASTRODUODENOSCOPY with biopsies;  Surgeon: Amanda Lovelace MD;  Location: [facility] ENDOSCOPY;  Service: Gastroenterology;  Laterality: N/A;  pre-anemia, dark stools  post-esophagitis, hiatal hernia    ENDOSCOPY N/A 09/15/2020    Procedure: ESOPHAGOGASTRODUODENOSCOPY WITH BIOPSIES;  Surgeon: Jose Enrique Louie MD;  Location: [facility] ENDOSCOPY;  Service: Gastroenterology;  Laterality: N/A;  pre: history of melena and esophagitis  post: mild esophagitis and gastritis, small hiatal hernia    HERNIA REPAIR Left 12/05/2019    THORACENTESIS      TONSILLECTOMY      VASECTOMY           FAMILY HISTORY  Family History    Problem Relation Age of Onset    Diabetes Mother     Hypertension Mother     Macular degeneration Mother     Alcohol abuse Father     Cancer Father         lung,  age 65    Heart disease Father     Alcohol abuse Brother     Cirrhosis Brother          age 50    Liver cancer Brother 45    Diabetes Son     Kidney failure Son     Autism Son     Malig Hyperthermia Neg Hx          SOCIAL HISTORY  Social History     Socioeconomic History    Marital status:      Spouse name: Lissette    Number of children: 3    Years of education: college   Tobacco Use    Smoking status: Never    Smokeless tobacco: Never    Tobacco comments:     CAFFEINE - OCCAS. SODA   Vaping Use    Vaping Use: Never used   Substance and Sexual Activity    Alcohol use: No     Comment: last drink 2 months ago    Drug use: No    Sexual activity: Defer         ALLERGIES  Prozac [fluoxetine hcl]        REVIEW OF SYSTEMS  Review of Systems         PHYSICAL EXAM  ED Triage Vitals [23 0536]   Temp Heart Rate Resp BP SpO2   97.4 °F (36.3 °C) 84 24 142/90 100 %      Temp src Heart Rate Source Patient Position BP Location FiO2 (%)   Tympanic Monitor Lying Right arm --       Physical Exam      GENERAL: no acute distress  HENT: normocephalic, atraumatic  EYES: no scleral icterus  CV: regular rhythm, normal rate, no lower extremity edema  RESPIRATORY: normal effort, CTA B, oxygen 100% on room air  ABDOMEN: nondistended soft nontender  MUSCULOSKELETAL: no deformity.  No C, T, L-spine tenderness.  No tenderness to the sacrum or coccyx.  Extremities appear atraumatic and are nontender with appropriate range of motion  NEURO: alert, moves all extremities, follows commands  PSYCH:  calm, cooperative  SKIN: warm, dry    Vital signs and nursing notes reviewed.          LAB RESULTS  Recent Results (from the past 24 hour(s))   Comprehensive Metabolic Panel    Collection Time: 23  8:09 AM    Specimen: Blood   Result Value Ref Range    Glucose 147  (H) 65 - 99 mg/dL    BUN 86 (H) 8 - 23 mg/dL    Creatinine 3.40 (H) 0.76 - 1.27 mg/dL    Sodium 136 136 - 145 mmol/L    Potassium 4.0 3.5 - 5.2 mmol/L    Chloride 96 (L) 98 - 107 mmol/L    CO2 23.7 22.0 - 29.0 mmol/L    Calcium 9.7 8.6 - 10.5 mg/dL    Total Protein 6.8 6.0 - 8.5 g/dL    Albumin 4.1 3.5 - 5.2 g/dL    ALT (SGPT) 32 1 - 41 U/L    AST (SGOT) 23 1 - 40 U/L    Alkaline Phosphatase 78 39 - 117 U/L    Total Bilirubin 0.3 0.0 - 1.2 mg/dL    Globulin 2.7 gm/dL    A/G Ratio 1.5 g/dL    BUN/Creatinine Ratio 25.3 (H) 7.0 - 25.0    Anion Gap 16.3 (H) 5.0 - 15.0 mmol/L    eGFR 18.9 (L) >60.0 mL/min/1.73   BNP    Collection Time: 06/14/23  8:09 AM    Specimen: Blood   Result Value Ref Range    proBNP 4,227.0 (H) 0.0 - 900.0 pg/mL   High Sensitivity Troponin T    Collection Time: 06/14/23  8:09 AM    Specimen: Blood   Result Value Ref Range    HS Troponin T 127 (C) <15 ng/L   CBC Auto Differential    Collection Time: 06/14/23  8:09 AM    Specimen: Blood   Result Value Ref Range    WBC 5.42 3.40 - 10.80 10*3/mm3    RBC 3.82 (L) 4.14 - 5.80 10*6/mm3    Hemoglobin 10.5 (L) 13.0 - 17.7 g/dL    Hematocrit 31.8 (L) 37.5 - 51.0 %    MCV 83.2 79.0 - 97.0 fL    MCH 27.5 26.6 - 33.0 pg    MCHC 33.0 31.5 - 35.7 g/dL    RDW 13.6 12.3 - 15.4 %    RDW-SD 41.6 37.0 - 54.0 fl    MPV 11.0 6.0 - 12.0 fL    Platelets 228 140 - 450 10*3/mm3    Neutrophil % 71.4 42.7 - 76.0 %    Lymphocyte % 18.1 (L) 19.6 - 45.3 %    Monocyte % 9.2 5.0 - 12.0 %    Eosinophil % 0.7 0.3 - 6.2 %    Basophil % 0.2 0.0 - 1.5 %    Immature Grans % 0.4 0.0 - 0.5 %    Neutrophils, Absolute 3.87 1.70 - 7.00 10*3/mm3    Lymphocytes, Absolute 0.98 0.70 - 3.10 10*3/mm3    Monocytes, Absolute 0.50 0.10 - 0.90 10*3/mm3    Eosinophils, Absolute 0.04 0.00 - 0.40 10*3/mm3    Basophils, Absolute 0.01 0.00 - 0.20 10*3/mm3    Immature Grans, Absolute 0.02 0.00 - 0.05 10*3/mm3    nRBC 0.0 0.0 - 0.2 /100 WBC   ECG 12 Lead Dyspnea    Collection Time: 06/14/23  8:45 AM    Result Value Ref Range    QT Interval 463 ms   High Sensitivity Troponin T 2Hr    Collection Time: 06/14/23 11:06 AM    Specimen: Blood   Result Value Ref Range    HS Troponin T 115 (C) <15 ng/L    Troponin T Delta -12 (L) >=-4 - <+4 ng/L       Ordered the above labs and reviewed the results.        RADIOLOGY  CT Head Without Contrast    Result Date: 6/14/2023  CT OF THE BRAIN WITHOUT CONTRAST 06/14/2023  HISTORY: Patient fell. Possible head injury. Patient on blood thinners.  Axial images were obtained through the brain without intravenous contrast.  There is moderate diffuse atrophy. There is decreased attenuation of the periventricular white matter bilaterally consistent with extensive small vessel white matter ischemic disease. Small old lacunar-type infarction is seen in the right corona radiata and there may be 1 or 2 tiny lacunar infarctions in the left basal ganglia such as on images 19 through 21.  There is no evidence of acute infarction, hemorrhage, midline shift or mass effect. Large probable mucous retention cyst is seen in the right maxillary sinus, also present on the 09/15/2022 study. Smoothly marginated partially calcified scalp nodules are seen in the midline occipital region and adjacent to the left superior parietal region, also stable since the previous study.      No acute intracranial process identified.    Radiation dose reduction techniques were utilized, including automated exposure control and exposure modulation based on body size.       XR Chest 1 View    Result Date: 6/14/2023  XR CHEST 1 VW-  HISTORY:  Fall today, chest pain and shortness of breath.  COMPARISON:  Chest radiograph 05/30/2023  FINDINGS:  A single view of the chest was obtained. The cardiac silhouette is normal size. There are postsurgical changes from CABG. There is calcific aortic atherosclerosis. Lungs and pleural spaces are clear. Sternotomy wires are partially imaged. There is an old posterior left rib fracture.   This report was finalized on 6/14/2023 8:23 AM by Dr. Katrin Cui M.D.       Ordered the above noted radiological studies. Reviewed by me in PACS.            PROCEDURES  Procedures              MEDICATIONS GIVEN IN ER  Medications - No data to display                MEDICAL DECISION MAKING, PROGRESS, and CONSULTS    All labs have been independently reviewed by me.  All radiology studies have been reviewed by me and I have also reviewed the radiology report.   EKG's independently viewed and interpreted by me.  Discussion below represents my analysis of pertinent findings related to patient's condition, differential diagnosis, treatment plan and final disposition.      Additional sources:    - Chronic or social conditions impacting care: dementia          Orders placed during this visit:  Orders Placed This Encounter   Procedures    XR Chest 1 View    CT Head Without Contrast    Comprehensive Metabolic Panel    BNP    High Sensitivity Troponin T    CBC Auto Differential    High Sensitivity Troponin T 2Hr    Monitor Blood Pressure    Pulse Oximetry, Continuous    ECG 12 Lead Dyspnea    CBC & Differential             Differential diagnosis:  Differential diagnosis includes but is not limited to CHF, acute coronary syndrome, pulmonary embolism, pneumothorax, pneumonia, asthma/COPD, deconditioning, anemia, anxiety.         Independent interpretation of labs, radiology studies, and discussions with consultants:  ED Course as of 06/14/23 1226   Wed Jun 14, 2023   0754 Patient is not tachypneic or dyspneic, appears comfortable, lung sounds clear, no hypoxia.  Will evaluate for subjective complaint of dyspnea as well as trauma in the setting of a fall on anticoagulation. [KA]   0915 Creatinine(!): 3.40  Chronic, stable   [KA]   0915 HS Troponin T(!!): 127  Varied from 96 - 101 on 5 different readings over the last 3 months, chronically elevated, no chest pain, no ischemia on EKG, will repeat a 2-hour troponin to assess for  acute elevation [KA]   1153 HS Troponin T(!!): 115 [KA]   1157 I reassessed the patient, he is resting, watching TV.  He denies any complaints, states he is feeling well, denies any dyspnea now.  Patient's troponin chronically elevated, not trending upward, chronically elevated.  He has no chest pain, EKG nonischemic, oxygen is 100% on room air without hypoxia, lung sounds clear, no evidence of acute CHF or significant cardiopulmonary problem. [KA]   1157   He had a fall without any significant trauma. Will ambulate with anticipation to discharge.  He is agreeable and eager for discharge. [KA]   1208 Patient ambulated with a walker which is his baseline and stable for discharge home. [KA]      ED Course User Index  [KA] Fatuma Escamilla PA             Patient was wearing a face mask when I entered the room and they continued to wear a mask throughout their stay in the ED.  I wore PPE, including  gloves, face mask with shield or face mask with goggles whenever I was in the room with patient.     DIAGNOSIS  Final diagnoses:   Dyspnea, unspecified type   Anticoagulated   Fall, initial encounter           Follow Up:  Florencia Caballero MD  [address] [id number]  [phone]    In 1 day      [facility]  [address] [facility] [address] [phone]    If symptoms worsen      RX:     Medication List      No changes were made to your prescriptions during this visit.         Latest Documented Vital Signs:  As of 12:26 EDT  BP- 137/76 HR- 69 Temp- 97.4 °F (36.3 °C) (Tympanic) O2 sat- 96%              --    Please note that portions of this were completed with a voice recognition program.       Note Disclaimer: At [facility], we believe that sharing information builds trust and better relationships. You are receiving this note because you are receiving care at [facility] or recently visited. It is possible you will see health information  before a provider has talked with you about it. This kind of information can be easy to misunderstand. To help you fully understand what it means for your health, we urge you to discuss this note with your provider.             Fatuma Escamilla PA  06/14/23 [id number]

## 2023-06-14 NOTE — ED NOTES
Pt arrived via [facility] ems from home w/ c/o mechanical fall. Upon ems arrival pt had labored breathing & pursed lip breathing. Per ems pt was 100% on RA, RR 36. Per pt he does have a hx. of CHF. Pt denies any pain @ this time. Per ems, pt placed on 4L nc.

## 2023-11-20 RX ORDER — TORSEMIDE 100 MG/1
100 TABLET ORAL DAILY
Qty: 90 TABLET | Refills: 2 | Status: SHIPPED | OUTPATIENT
Start: 2023-11-20

## 2023-11-20 NOTE — TELEPHONE ENCOUNTER
Last OV 7/21/23  Next OV 1/24/24  Labs 6/14/23    Does not meet protocol  Please advise    [facility]

## 2023-11-23 ENCOUNTER — HOSPITAL ENCOUNTER (EMERGENCY)
Facility: HOSPITAL | Age: 68
Discharge: HOME | End: 2023-11-23
Attending: STUDENT IN AN ORGANIZED HEALTH CARE EDUCATION/TRAINING PROGRAM
Payer: MEDICARE

## 2023-11-23 ENCOUNTER — APPOINTMENT (OUTPATIENT)
Dept: GENERAL RADIOLOGY | Facility: HOSPITAL | Age: 68
End: 2023-11-23
Payer: MEDICARE

## 2023-11-23 VITALS
SYSTOLIC BLOOD PRESSURE: 152 MMHG | HEART RATE: 80 BPM | OXYGEN SATURATION: 96 % | TEMPERATURE: 98.6 F | WEIGHT: 139.1 LBS | DIASTOLIC BLOOD PRESSURE: 78 MMHG | HEIGHT: 72 IN | RESPIRATION RATE: 16 BRPM | BODY MASS INDEX: 18.84 KG/M2

## 2023-11-23 DIAGNOSIS — R53.1 WEAKNESS: Primary | ICD-10-CM

## 2023-11-23 LAB
ALBUMIN SERPL-MCNC: 4.5 G/DL (ref 3.5–5.2)
ALBUMIN/GLOB SERPL: 2 G/DL
ALP SERPL-CCNC: 83 U/L (ref 39–117)
ALT SERPL W P-5'-P-CCNC: 15 U/L (ref 1–41)
ANION GAP SERPL CALCULATED.3IONS-SCNC: 14 MMOL/L (ref 5–15)
APTT PPP: 34.8 SECONDS (ref 22.7–35.4)
AST SERPL-CCNC: 17 U/L (ref 1–40)
BASOPHILS # BLD AUTO: 0.01 10*3/MM3 (ref 0–0.2)
BASOPHILS NFR BLD AUTO: 0.1 % (ref 0–1.5)
BILIRUB SERPL-MCNC: 0.6 MG/DL (ref 0–1.2)
BUN SERPL-MCNC: 39 MG/DL (ref 8–23)
BUN/CREAT SERPL: 10.2 (ref 7–25)
CALCIUM SPEC-SCNC: 9.7 MG/DL (ref 8.6–10.5)
CHLORIDE SERPL-SCNC: 99 MMOL/L (ref 98–107)
CO2 SERPL-SCNC: 25 MMOL/L (ref 22–29)
CREAT SERPL-MCNC: 3.84 MG/DL (ref 0.76–1.27)
DEPRECATED RDW RBC AUTO: 41 FL (ref 37–54)
EGFRCR SERPLBLD CKD-EPI 2021: 16.3 ML/MIN/1.73
EOSINOPHIL # BLD AUTO: 0 10*3/MM3 (ref 0–0.4)
EOSINOPHIL NFR BLD AUTO: 0 % (ref 0.3–6.2)
ERYTHROCYTE [DISTWIDTH] IN BLOOD BY AUTOMATED COUNT: 13.2 % (ref 12.3–15.4)
GLOBULIN UR ELPH-MCNC: 2.3 GM/DL
GLUCOSE SERPL-MCNC: 178 MG/DL (ref 65–99)
HCT VFR BLD AUTO: 35.2 % (ref 37.5–51)
HGB BLD-MCNC: 11.5 G/DL (ref 13–17.7)
IMM GRANULOCYTES # BLD AUTO: 0.03 10*3/MM3 (ref 0–0.05)
IMM GRANULOCYTES NFR BLD AUTO: 0.3 % (ref 0–0.5)
INR PPP: 1.21 (ref 0.9–1.1)
LYMPHOCYTES # BLD AUTO: 0.53 10*3/MM3 (ref 0.7–3.1)
LYMPHOCYTES NFR BLD AUTO: 5.5 % (ref 19.6–45.3)
MCH RBC QN AUTO: 28.3 PG (ref 26.6–33)
MCHC RBC AUTO-ENTMCNC: 32.7 G/DL (ref 31.5–35.7)
MCV RBC AUTO: 86.7 FL (ref 79–97)
MONOCYTES # BLD AUTO: 0.57 10*3/MM3 (ref 0.1–0.9)
MONOCYTES NFR BLD AUTO: 5.9 % (ref 5–12)
NEUTROPHILS NFR BLD AUTO: 8.44 10*3/MM3 (ref 1.7–7)
NEUTROPHILS NFR BLD AUTO: 88.2 % (ref 42.7–76)
NRBC BLD AUTO-RTO: 0 /100 WBC (ref 0–0.2)
PLATELET # BLD AUTO: 159 10*3/MM3 (ref 140–450)
PMV BLD AUTO: 10.7 FL (ref 6–12)
POTASSIUM SERPL-SCNC: 3.9 MMOL/L (ref 3.5–5.2)
PROT SERPL-MCNC: 6.8 G/DL (ref 6–8.5)
PROTHROMBIN TIME: 15.4 SECONDS (ref 11.7–14.2)
RBC # BLD AUTO: 4.06 10*6/MM3 (ref 4.14–5.8)
SODIUM SERPL-SCNC: 138 MMOL/L (ref 136–145)
WBC NRBC COR # BLD AUTO: 9.58 10*3/MM3 (ref 3.4–10.8)

## 2023-11-23 PROCEDURE — 85730 THROMBOPLASTIN TIME PARTIAL: CPT | Performed by: STUDENT IN AN ORGANIZED HEALTH CARE EDUCATION/TRAINING PROGRAM

## 2023-11-23 PROCEDURE — 93005 ELECTROCARDIOGRAM TRACING: CPT | Performed by: STUDENT IN AN ORGANIZED HEALTH CARE EDUCATION/TRAINING PROGRAM

## 2023-11-23 PROCEDURE — 85025 COMPLETE CBC W/AUTO DIFF WBC: CPT | Performed by: STUDENT IN AN ORGANIZED HEALTH CARE EDUCATION/TRAINING PROGRAM

## 2023-11-23 PROCEDURE — 85610 PROTHROMBIN TIME: CPT | Performed by: STUDENT IN AN ORGANIZED HEALTH CARE EDUCATION/TRAINING PROGRAM

## 2023-11-23 PROCEDURE — 99284 EMERGENCY DEPT VISIT MOD MDM: CPT

## 2023-11-23 PROCEDURE — 80053 COMPREHEN METABOLIC PANEL: CPT | Performed by: STUDENT IN AN ORGANIZED HEALTH CARE EDUCATION/TRAINING PROGRAM

## 2023-11-23 PROCEDURE — 71045 X-RAY EXAM CHEST 1 VIEW: CPT

## 2023-11-23 NOTE — DISCHARGE INSTRUCTIONS
Please follow-up with your primary care physician within 1 to 2 weeks for repeat evaluation    Please call your dialysis center tomorrow to attempt to get an dialysis appointment tomorrow.  If you cannot be seen tomorrow please get your regular dialysis on Saturday.    Please return to the emergency department with new or worsening symptoms including but not limited to chest pain, palpitations, inability to ambulate, shortness of breath, cough, fevers, chills.

## 2023-11-23 NOTE — ED NOTES
Spoke with pt wife to update on pt condition and plan of discharge, she is on her way to transport pt home.

## 2023-11-23 NOTE — ED PROVIDER NOTES
EMERGENCY DEPARTMENT ENCOUNTER    Room Number:  35/35  PCP: Florencia Caballero MD  Historian: Patient      HPI:  Chief Complaint: Generalized weakness  Context: Carlito Mo is a 68 y.o. male who presents to the ED c/o generalized weakness.  Patient has a history of end-stage renal disease on dialysis Tuesday, Thursday, Saturday.  Patient provides an unclear history but states he was not able to get dialysis performed today.  When asked why he states he went to the dialysis clinic but because he had to use the restroom they were unable to perform dialysis.  Patient states last dialysis was Tuesday.  Patient states he has some shortness of breath and cough.  Patient states he has generalized weak and having difficulty getting up moving around.  Patient provides minimal history.            PAST MEDICAL HISTORY  Active Ambulatory Problems     Diagnosis Date Noted    Major depressive disorder with single episode, in partial remission     Other hyperlipidemia     Type 2 diabetes mellitus, with long-term current use of insulin     Vitamin D deficiency 12/17/2015    Erectile dysfunction 12/17/2015    Chronic fatigue 04/25/2016    Type 2 diabetes mellitus with ophthalmic complication 04/25/2016    Skin lesion of chest wall 06/20/2016    Slow transit constipation 09/01/2016    Benign prostatic hyperplasia with nocturia 12/20/2016    History of basal cell carcinoma 12/20/2016    High blood pressure associated with diabetes 12/20/2016    Acquired hypothyroidism 12/20/2017    Hypertensive urgency 02/16/2018    Recurrent strokes 02/20/2018    Noncompliance w/medication treatment due to intermit use of medication 05/04/2018    Urinary retention 09/20/2018    Pneumonia 09/21/2018    Acute on chronic combined systolic and diastolic congestive heart failure 09/21/2018    Acute respiratory failure with hypoxia 09/21/2018    Suspected sleep apnea 10/29/2018    Pleural effusion 12/01/2018    YAW (obstructive sleep apnea) 12/13/2018     Coronary artery disease involving native coronary artery of native heart without angina pectoris 04/18/2019    Chronically elevated troponin 07/02/2019    Colon cancer screening 10/30/2018    Enlarged lymph nodes 10/30/2018    Functional gait abnormality 10/30/2018    History of myocardial infarction 02/06/2019    Hospital discharge follow-up 05/31/2019    Insomnia 02/06/2019    Iron deficiency anemia 10/02/2018    Major depression, recurrent 10/16/2012    Anemia, chronic renal failure, stage 3 (moderate) 02/27/2020    Essential hypertension 03/10/2020    Adverse effect of iron and its compounds, initial encounter 05/23/2020    Acute on chronic renal insufficiency 05/25/2020    Debility 05/25/2020    Falls frequently 05/25/2020    Acute pain of right shoulder 05/27/2020    Acute on chronic anemia 05/25/2020    Heme positive stool 05/25/2020    Neurogenic orthostatic hypotension 05/30/2020    Anemia, chronic disease 05/25/2020    History of colon polyps 05/25/2020    Helicobacter pylori gastritis 06/04/2020    Esophagitis 06/10/2020    Sleep related hypoxia 07/23/2020    Embolic stroke 05/20/2021    Patent foramen ovale 05/22/2021    Pleural effusion, bilateral 05/22/2021    Cardiomyopathy 05/24/2021    Lower abdominal pain 12/13/2021    Diarrhea 12/14/2021    CKD (chronic kidney disease) stage 4, GFR 15-29 ml/min 12/16/2021    Hypertension not at goal 02/02/2022    Memory loss due to medical condition 02/08/2022    Generalized weakness 03/01/2022    ERRONEOUS ENCOUNTER--DISREGARD 03/09/2022    Weakness 06/03/2022    Paroxysmal atrial fibrillation 07/25/2022    Chronic anticoagulation 07/25/2022    Multiple falls 09/15/2022    Dehydration 09/16/2022    SYLVAIN (acute kidney injury) 09/16/2022    Acute ischemic stroke 09/17/2022    Acute combined systolic and diastolic congestive heart failure 05/02/2023    History of stroke 05/02/2023    Iron deficiency anemia 05/04/2023    Acute HFrEF (heart failure with reduced  ejection fraction) 05/05/2023     Resolved Ambulatory Problems     Diagnosis Date Noted    Anemia     Arthritis     Diabetes mellitus out of control     Acute sinusitis     Accumulation of fluid in tissues 12/17/2015    Fatigue 12/17/2015    Cardiomyopathy, ischemic 12/17/2015    Acute appendicitis 03/02/2016    Atherosclerosis of coronary artery 10/16/2012    Altered mental status 11/30/2017    Hypertensive encephalopathy syndrome 04/30/2018    Hyperglycemia 05/04/2018    Screening for colorectal cancer 08/22/2018    Constipation 08/22/2018    Snoring 12/13/2018    Lower abdominal pain 05/27/2020    Hypertensive emergency 02/01/2022    DE LEON (dyspnea on exertion) 03/07/2023    Shortness of breath 03/08/2023     Past Medical History:   Diagnosis Date    Acute congestive heart failure     CAD (coronary artery disease)     Cancer     Chronic combined systolic and diastolic congestive heart failure     Chronic ischemic heart disease     Chronic kidney disease (CKD)     CKD (chronic kidney disease)     COPD (chronic obstructive pulmonary disease)     Depression     Diabetes mellitus type 2, uncontrolled, without complications     Diabetic neuropathy     Disease of thyroid gland     Elevated cholesterol     Frequent PVCs     GERD (gastroesophageal reflux disease)     Heart attack     History of coronary artery disease     Hyperlipidemia     Hypertension     Hypertensive encephalopathy     Mental status change     NO DEVICE/RECALLED     Poor historian     RBBB (right bundle branch block)     Stroke     TIA (transient ischemic attack)     Type 2 diabetes mellitus          PAST SURGICAL HISTORY  Past Surgical History:   Procedure Laterality Date    APPENDECTOMY N/A 02/14/2016    Dr. Alexey Dodson    ARTERIOVENOUS FISTULA/SHUNT SURGERY Right 06/03/2022    Procedure: RIGHT FOREARM ARTERIOVENOUS FISTULA PLACEMENT RADIOCEPHALIC WITH CEPHALIC VEIN TRANSPOSITION;  Surgeon: Eliseo Willis MD;  Location: [facility];   Service: Vascular;  Laterality: Right;    COLONOSCOPY      over 20 years ago.  no polyps     COLONOSCOPY N/A 2018    Procedure: COLONOSCOPY;  Surgeon: Jose Enrique Louie MD;  Location:  [facility];  Service: Gastroenterology    COLONOSCOPY N/A 2018    IH, EH, polyps (TAs w/low grade dysplasia)    COLONOSCOPY N/A 2020    Procedure: COLONOSCOPY;  Surgeon: Jose Enrique Louie MD;  Location: [facility];  Service: Gastroenterology;  Laterality: N/A;  Pre op-Anemia, Melena, History of Polyps  Post op-To Cecum/TI, Polyp, Poor Prep    CORONARY ARTERY BYPASS GRAFT  2001    ENDOSCOPY N/A 2020    Procedure: ESOPHAGOGASTRODUODENOSCOPY with biopsies;  Surgeon: Amanda Lovelace MD;  Location: [facility];  Service: Gastroenterology;  Laterality: N/A;  pre-anemia, dark stools  post-esophagitis, hiatal hernia    ENDOSCOPY N/A 09/15/2020    Procedure: ESOPHAGOGASTRODUODENOSCOPY WITH BIOPSIES;  Surgeon: Jose Enrique Louie MD;  Location: [facility];  Service: Gastroenterology;  Laterality: N/A;  pre: history of melena and esophagitis  post: mild esophagitis and gastritis, small hiatal hernia    HERNIA REPAIR Left 2019    THORACENTESIS      TONSILLECTOMY      VASECTOMY           FAMILY HISTORY  Family History   Problem Relation Age of Onset    Diabetes Mother     Hypertension Mother     Macular degeneration Mother     Alcohol abuse Father     Cancer Father         lung,  age 65    Heart disease Father     Alcohol abuse Brother     Cirrhosis Brother          age 50    Liver cancer Brother 45    Diabetes Son     Kidney failure Son     Autism Son     Malig Hyperthermia Neg Hx          SOCIAL HISTORY  Social History     Socioeconomic History    Marital status:      Spouse name: Lissette    Number of children: 3    Years of education: college   Tobacco Use    Smoking status: Never    Smokeless tobacco: Never    Tobacco comments:     CAFFEINE - OCCAS. SODA    Vaping Use    Vaping Use: Never used   Substance and Sexual Activity    Alcohol use: No     Comment: last drink 2 months ago    Drug use: No    Sexual activity: Defer         ALLERGIES  Prozac [fluoxetine hcl]        REVIEW OF SYSTEMS  Review of Systems   Constitutional:  Positive for fatigue.   Respiratory:  Positive for cough and shortness of breath.    Neurological:  Positive for weakness.   All other systems reviewed and are negative.       PHYSICAL EXAM  ED Triage Vitals [11/23/23 1415]   Temp Heart Rate Resp BP SpO2   98.6 °F (37 °C) 90 16 162/78 100 %      Temp src Heart Rate Source Patient Position BP Location FiO2 (%)   -- -- -- -- --       Physical Exam      GENERAL: no acute distress chronically ill-appearing  HENT: nares patent  EYES: no scleral icterus  CV: regular rhythm, normal rate  RESPIRATORY: normal effort  ABDOMEN: soft  MUSCULOSKELETAL: no deformity  NEURO: alert, moves all extremities, follows commands  PSYCH:  calm, cooperative  SKIN: warm, dry    Vital signs and nursing notes reviewed.          LAB RESULTS  Recent Results (from the past 24 hour(s))   Comprehensive Metabolic Panel    Collection Time: 11/23/23  2:46 PM    Specimen: Blood   Result Value Ref Range    Glucose 178 (H) 65 - 99 mg/dL    BUN 39 (H) 8 - 23 mg/dL    Creatinine 3.84 (H) 0.76 - 1.27 mg/dL    Sodium 138 136 - 145 mmol/L    Potassium 3.9 3.5 - 5.2 mmol/L    Chloride 99 98 - 107 mmol/L    CO2 25.0 22.0 - 29.0 mmol/L    Calcium 9.7 8.6 - 10.5 mg/dL    Total Protein 6.8 6.0 - 8.5 g/dL    Albumin 4.5 3.5 - 5.2 g/dL    ALT (SGPT) 15 1 - 41 U/L    AST (SGOT) 17 1 - 40 U/L    Alkaline Phosphatase 83 39 - 117 U/L    Total Bilirubin 0.6 0.0 - 1.2 mg/dL    Globulin 2.3 gm/dL    A/G Ratio 2.0 g/dL    BUN/Creatinine Ratio 10.2 7.0 - 25.0    Anion Gap 14.0 5.0 - 15.0 mmol/L    eGFR 16.3 (L) >60.0 mL/min/1.73   Protime-INR    Collection Time: 11/23/23  2:46 PM    Specimen: Blood   Result Value Ref Range    Protime 15.4 (H) 11.7 - 14.2  Seconds    INR 1.21 (H) 0.90 - 1.10   aPTT    Collection Time: 11/23/23  2:46 PM    Specimen: Blood   Result Value Ref Range    PTT 34.8 22.7 - 35.4 seconds   CBC Auto Differential    Collection Time: 11/23/23  2:46 PM    Specimen: Blood   Result Value Ref Range    WBC 9.58 3.40 - 10.80 10*3/mm3    RBC 4.06 (L) 4.14 - 5.80 10*6/mm3    Hemoglobin 11.5 (L) 13.0 - 17.7 g/dL    Hematocrit 35.2 (L) 37.5 - 51.0 %    MCV 86.7 79.0 - 97.0 fL    MCH 28.3 26.6 - 33.0 pg    MCHC 32.7 31.5 - 35.7 g/dL    RDW 13.2 12.3 - 15.4 %    RDW-SD 41.0 37.0 - 54.0 fl    MPV 10.7 6.0 - 12.0 fL    Platelets 159 140 - 450 10*3/mm3    Neutrophil % 88.2 (H) 42.7 - 76.0 %    Lymphocyte % 5.5 (L) 19.6 - 45.3 %    Monocyte % 5.9 5.0 - 12.0 %    Eosinophil % 0.0 (L) 0.3 - 6.2 %    Basophil % 0.1 0.0 - 1.5 %    Immature Grans % 0.3 0.0 - 0.5 %    Neutrophils, Absolute 8.44 (H) 1.70 - 7.00 10*3/mm3    Lymphocytes, Absolute 0.53 (L) 0.70 - 3.10 10*3/mm3    Monocytes, Absolute 0.57 0.10 - 0.90 10*3/mm3    Eosinophils, Absolute 0.00 0.00 - 0.40 10*3/mm3    Basophils, Absolute 0.01 0.00 - 0.20 10*3/mm3    Immature Grans, Absolute 0.03 0.00 - 0.05 10*3/mm3    nRBC 0.0 0.0 - 0.2 /100 WBC   ECG 12 Lead Electrolyte Imbalance    Collection Time: 11/23/23  3:26 PM   Result Value Ref Range    QT Interval 421 ms    QTC Interval 498 ms       Ordered the above labs and reviewed the results.        RADIOLOGY  No Radiology Exams Resulted Within Past 24 Hours    Ordered the above noted radiological studies. Reviewed by me in PACS.            MEDICATIONS GIVEN IN ER  Medications - No data to display                MEDICAL DECISION MAKING, PROGRESS, and CONSULTS    All labs have been independently reviewed by me.  All radiology studies have been reviewed by me and I have also reviewed the radiology report.   EKG's independently viewed and interpreted by me.  Discussion below represents my analysis of pertinent findings related to patient's condition, differential  diagnosis, treatment plan and final disposition.      Additional sources:  - External (non-ED) record review: Office visit with neurology from 6/22/2023 reviewed and notable for history of recurrent strokes secondary to atrial fibrillation, history of CAD with previous CABG, diabetes, chronic anemia, CKD, hyperlipidemia, CHF, hypertension, YAW who was following up for a stroke.  Plan at that visit was to proceed with neuropsych eval, continue Eliquis refer to sleep medicine    - Chronic or social conditions impacting care: ESRD, history of strokes    - Shared decision making: Utilizing shared decision-making techniques we discussed today's findings and plan moving forward.  At this time patient is appropriate for outpatient therapy with follow-up with primary care and dialysis tomorrow or Saturday.      Orders placed during this visit:  Orders Placed This Encounter   Procedures    XR Chest 1 View    Comprehensive Metabolic Panel    Urinalysis With Microscopic If Indicated (No Culture) - Urine, Clean Catch    Protime-INR    aPTT    CBC Auto Differential    ECG 12 Lead Electrolyte Imbalance    CBC & Differential         Differential diagnosis includes but is not limited to:    Electrolyte abnormality, hyperkalemia, pneumonia      Independent interpretation of labs, radiology studies, and discussions with consultants:  ED Course as of 11/23/23 1541   Thu Nov 23, 2023   1504 INR(!): 1.21 [MW]   1504 Hemoglobin(!): 11.5 [MW]   1518 Chest x-ray interpreted by me demonstrates no evidence of consolidation or infiltrate [MW]   1529 EKG interpreted by me demonstrates atrial fibrillation, rate of 84, right bundle branch block.  Similar in appearance to previous EKG [MW]   1538 Work-up in the emergency department is overall unremarkable.  Patient does have slight drop in O2 saturations while sleeping but has a history of YAW.  Given unremarkable lab work-up patient is appropriate for outpatient follow-up.  Counseled patient to  contact his dialysis center to see if he can receive dialysis tomorrow or if he will have to wait for his previously scheduled dialysis on Saturday.  Counseled patient on supportive care measures and return precautions.  Patient discharged in stable condition. [MW]      ED Course User Index  [MW] Eliseo Lee MD             DIAGNOSIS  Final diagnoses:   Weakness         DISPOSITION  DISCHARGE    Patient discharged in stable condition.    Reviewed implications of results, diagnosis, meds, responsibility to follow up, warning signs and symptoms of possible worsening, potential complications and reasons to return to ER, including chest pain, shortness of breath, nausea, vomiting, palpitations, confusion.    Patient/Family voiced understanding of above instructions.    Discussed plan for discharge, as there is no emergent indication for admission. Patient referred to primary care provider for BP management due to today's BP. Pt/family is agreeable and understands need for follow up and repeat testing.  Pt is aware that discharge does not mean that nothing is wrong but it indicates no emergency is present that requires admission and they must continue care with follow-up as given below or physician of their choice.     FOLLOW-UP  Florencia Caballero MD  [address] [address]  [phone]    In 1 week           Medication List      No changes were made to your prescriptions during this visit.                   Latest Documented Vital Signs:  As of 15:41 EST  BP- (!) 181/87 HR- 82 Temp- 98.6 °F (37 °C) O2 sat- 96%              --    Please note that portions of this were completed with a voice recognition program.       Note Disclaimer: At [facility], we believe that sharing information builds trust and better relationships. You are receiving this note because you are receiving care at [facility] or recently visited. It is possible you will see health information before a provider  has talked with you about it. This kind of information can be easy to misunderstand. To help you fully understand what it means for your health, we urge you to discuss this note with your provider.             Eliseo Lee MD  11/23/23 8150

## 2023-11-24 ENCOUNTER — HOSPITAL ENCOUNTER (EMERGENCY)
Facility: HOSPITAL | Age: 68
Discharge: HOME | End: 2023-11-24
Attending: EMERGENCY MEDICINE
Payer: MEDICARE

## 2023-11-24 ENCOUNTER — APPOINTMENT (OUTPATIENT)
Dept: CT IMAGING | Facility: HOSPITAL | Age: 68
End: 2023-11-24
Payer: MEDICARE

## 2023-11-24 VITALS
RESPIRATION RATE: 18 BRPM | OXYGEN SATURATION: 97 % | SYSTOLIC BLOOD PRESSURE: 149 MMHG | TEMPERATURE: 98 F | DIASTOLIC BLOOD PRESSURE: 76 MMHG | HEART RATE: 108 BPM

## 2023-11-24 DIAGNOSIS — N18.6 ESRD ON HEMODIALYSIS: ICD-10-CM

## 2023-11-24 DIAGNOSIS — Z79.4 TYPE 2 DIABETES MELLITUS WITH HYPERGLYCEMIA, WITH LONG-TERM CURRENT USE OF INSULIN: ICD-10-CM

## 2023-11-24 DIAGNOSIS — Z99.2 ESRD ON HEMODIALYSIS: ICD-10-CM

## 2023-11-24 DIAGNOSIS — E78.5 HYPERLIPIDEMIA, UNSPECIFIED HYPERLIPIDEMIA TYPE: ICD-10-CM

## 2023-11-24 DIAGNOSIS — I10 HYPERTENSION, UNSPECIFIED TYPE: ICD-10-CM

## 2023-11-24 DIAGNOSIS — R11.2 NAUSEA, VOMITING, AND DIARRHEA: Primary | ICD-10-CM

## 2023-11-24 DIAGNOSIS — R19.7 NAUSEA, VOMITING, AND DIARRHEA: Primary | ICD-10-CM

## 2023-11-24 DIAGNOSIS — E11.65 TYPE 2 DIABETES MELLITUS WITH HYPERGLYCEMIA, WITH LONG-TERM CURRENT USE OF INSULIN: ICD-10-CM

## 2023-11-24 LAB
ALBUMIN SERPL-MCNC: 4 G/DL (ref 3.5–5.2)
ALBUMIN/GLOB SERPL: 1.7 G/DL
ALP SERPL-CCNC: 74 U/L (ref 39–117)
ALT SERPL W P-5'-P-CCNC: 17 U/L (ref 1–41)
ANION GAP SERPL CALCULATED.3IONS-SCNC: 12.1 MMOL/L (ref 5–15)
AST SERPL-CCNC: 22 U/L (ref 1–40)
BASOPHILS # BLD AUTO: 0.01 10*3/MM3 (ref 0–0.2)
BASOPHILS NFR BLD AUTO: 0.1 % (ref 0–1.5)
BILIRUB SERPL-MCNC: 0.4 MG/DL (ref 0–1.2)
BUN SERPL-MCNC: 44 MG/DL (ref 8–23)
BUN/CREAT SERPL: 11.9 (ref 7–25)
CALCIUM SPEC-SCNC: 9.1 MG/DL (ref 8.6–10.5)
CHLORIDE SERPL-SCNC: 98 MMOL/L (ref 98–107)
CO2 SERPL-SCNC: 22.9 MMOL/L (ref 22–29)
CREAT SERPL-MCNC: 3.7 MG/DL (ref 0.76–1.27)
DEPRECATED RDW RBC AUTO: 40 FL (ref 37–54)
EGFRCR SERPLBLD CKD-EPI 2021: 17.1 ML/MIN/1.73
EOSINOPHIL # BLD AUTO: 0 10*3/MM3 (ref 0–0.4)
EOSINOPHIL NFR BLD AUTO: 0 % (ref 0.3–6.2)
ERYTHROCYTE [DISTWIDTH] IN BLOOD BY AUTOMATED COUNT: 12.9 % (ref 12.3–15.4)
GLOBULIN UR ELPH-MCNC: 2.4 GM/DL
GLUCOSE BLDC GLUCOMTR-MCNC: 109 MG/DL (ref 70–130)
GLUCOSE SERPL-MCNC: 323 MG/DL (ref 65–99)
HCT VFR BLD AUTO: 34.6 % (ref 37.5–51)
HGB BLD-MCNC: 10.7 G/DL (ref 13–17.7)
IMM GRANULOCYTES # BLD AUTO: 0.04 10*3/MM3 (ref 0–0.05)
IMM GRANULOCYTES NFR BLD AUTO: 0.4 % (ref 0–0.5)
LIPASE SERPL-CCNC: 31 U/L (ref 13–60)
LYMPHOCYTES # BLD AUTO: 0.41 10*3/MM3 (ref 0.7–3.1)
LYMPHOCYTES NFR BLD AUTO: 4.1 % (ref 19.6–45.3)
MCH RBC QN AUTO: 26.8 PG (ref 26.6–33)
MCHC RBC AUTO-ENTMCNC: 30.9 G/DL (ref 31.5–35.7)
MCV RBC AUTO: 86.7 FL (ref 79–97)
MONOCYTES # BLD AUTO: 0.58 10*3/MM3 (ref 0.1–0.9)
MONOCYTES NFR BLD AUTO: 5.8 % (ref 5–12)
NEUTROPHILS NFR BLD AUTO: 8.97 10*3/MM3 (ref 1.7–7)
NEUTROPHILS NFR BLD AUTO: 89.6 % (ref 42.7–76)
NRBC BLD AUTO-RTO: 0 /100 WBC (ref 0–0.2)
PHOSPHATE SERPL-MCNC: 3.2 MG/DL (ref 2.5–4.5)
PLATELET # BLD AUTO: 152 10*3/MM3 (ref 140–450)
PMV BLD AUTO: 11.4 FL (ref 6–12)
POTASSIUM SERPL-SCNC: 3.7 MMOL/L (ref 3.5–5.2)
PROT SERPL-MCNC: 6.4 G/DL (ref 6–8.5)
RBC # BLD AUTO: 3.99 10*6/MM3 (ref 4.14–5.8)
SODIUM SERPL-SCNC: 133 MMOL/L (ref 136–145)
WBC NRBC COR # BLD AUTO: 10.01 10*3/MM3 (ref 3.4–10.8)

## 2023-11-24 PROCEDURE — 99284 EMERGENCY DEPT VISIT MOD MDM: CPT

## 2023-11-24 PROCEDURE — 74176 CT ABD & PELVIS W/O CONTRAST: CPT

## 2023-11-24 PROCEDURE — 96375 TX/PRO/DX INJ NEW DRUG ADDON: CPT

## 2023-11-24 PROCEDURE — 83690 ASSAY OF LIPASE: CPT | Performed by: EMERGENCY MEDICINE

## 2023-11-24 PROCEDURE — 25010000002 MORPHINE PER 10 MG: Performed by: EMERGENCY MEDICINE

## 2023-11-24 PROCEDURE — 63710000001 INSULIN REGULAR HUMAN PER 5 UNITS: Performed by: PHYSICIAN ASSISTANT

## 2023-11-24 PROCEDURE — 25010000002 ONDANSETRON PER 1 MG: Performed by: EMERGENCY MEDICINE

## 2023-11-24 PROCEDURE — 84100 ASSAY OF PHOSPHORUS: CPT | Performed by: EMERGENCY MEDICINE

## 2023-11-24 PROCEDURE — 82948 REAGENT STRIP/BLOOD GLUCOSE: CPT

## 2023-11-24 PROCEDURE — 80053 COMPREHEN METABOLIC PANEL: CPT | Performed by: EMERGENCY MEDICINE

## 2023-11-24 PROCEDURE — 25810000003 SODIUM CHLORIDE 0.9 % SOLUTION: Performed by: PHYSICIAN ASSISTANT

## 2023-11-24 PROCEDURE — 85025 COMPLETE CBC W/AUTO DIFF WBC: CPT | Performed by: EMERGENCY MEDICINE

## 2023-11-24 PROCEDURE — 96374 THER/PROPH/DIAG INJ IV PUSH: CPT

## 2023-11-24 RX ORDER — ONDANSETRON 2 MG/ML
4 INJECTION INTRAMUSCULAR; INTRAVENOUS ONCE
Status: COMPLETED | OUTPATIENT
Start: 2023-11-24 | End: 2023-11-24

## 2023-11-24 RX ORDER — ONDANSETRON 4 MG/1
4 TABLET, ORALLY DISINTEGRATING ORAL EVERY 8 HOURS PRN
Qty: 12 TABLET | Refills: 0 | Status: ON HOLD | OUTPATIENT
Start: 2023-11-24

## 2023-11-24 RX ORDER — MORPHINE SULFATE 2 MG/ML
4 INJECTION, SOLUTION INTRAMUSCULAR; INTRAVENOUS ONCE
Status: COMPLETED | OUTPATIENT
Start: 2023-11-24 | End: 2023-11-24

## 2023-11-24 RX ORDER — SODIUM CHLORIDE 0.9 % (FLUSH) 0.9 %
10 SYRINGE (ML) INJECTION AS NEEDED
Status: DISCONTINUED | OUTPATIENT
Start: 2023-11-24 | End: 2023-11-25 | Stop reason: HOSPADM

## 2023-11-24 RX ADMIN — MORPHINE SULFATE 4 MG: 2 INJECTION, SOLUTION INTRAMUSCULAR; INTRAVENOUS at 18:58

## 2023-11-24 RX ADMIN — Medication 10 ML: at 18:57

## 2023-11-24 RX ADMIN — SODIUM CHLORIDE 250 ML: 9 INJECTION, SOLUTION INTRAVENOUS at 20:58

## 2023-11-24 RX ADMIN — INSULIN HUMAN 10 UNITS: 100 INJECTION, SOLUTION PARENTERAL at 20:56

## 2023-11-24 RX ADMIN — ONDANSETRON 4 MG: 2 INJECTION INTRAMUSCULAR; INTRAVENOUS at 18:57

## 2023-11-24 NOTE — ED PROVIDER NOTES
EMERGENCY DEPARTMENT ENCOUNTER    Room Number:  32/32  PCP: Florencia Caballero MD  Historian: Patient, EMS      HPI:  Chief Complaint: Vomiting and diarrhea  A complete HPI/ROS/PMH/PSH/SH/FH are unobtainable due to: Nothing  Context: Carlito Mo is a 68 y.o. male who presents to the ED c/o acute onset of vomiting and diarrhea onset this evening.  He was at a restaurant with family when symptoms occurred.  EMS states that he had copious diarrhea and also multiple episodes of vomiting.  Patient has a history of ESRD on hemodialysis.  He had his last full session on Tuesday and was supposed to go yesterday but did not.  His nephrologist is Dr. Bryan Huddleston.            PAST MEDICAL HISTORY  Active Ambulatory Problems     Diagnosis Date Noted    Major depressive disorder with single episode, in partial remission     Other hyperlipidemia     Type 2 diabetes mellitus, with long-term current use of insulin     Vitamin D deficiency 12/17/2015    Erectile dysfunction 12/17/2015    Chronic fatigue 04/25/2016    Type 2 diabetes mellitus with ophthalmic complication 04/25/2016    Skin lesion of chest wall 06/20/2016    Slow transit constipation 09/01/2016    Benign prostatic hyperplasia with nocturia 12/20/2016    History of basal cell carcinoma 12/20/2016    High blood pressure associated with diabetes 12/20/2016    Acquired hypothyroidism 12/20/2017    Hypertensive urgency 02/16/2018    Recurrent strokes 02/20/2018    Noncompliance w/medication treatment due to intermit use of medication 05/04/2018    Urinary retention 09/20/2018    Pneumonia 09/21/2018    Acute on chronic combined systolic and diastolic congestive heart failure 09/21/2018    Acute respiratory failure with hypoxia 09/21/2018    Suspected sleep apnea 10/29/2018    Pleural effusion 12/01/2018    YAW (obstructive sleep apnea) 12/13/2018    Coronary artery disease involving native coronary artery of native heart without angina pectoris 04/18/2019    Chronically  elevated troponin 07/02/2019    Colon cancer screening 10/30/2018    Enlarged lymph nodes 10/30/2018    Functional gait abnormality 10/30/2018    History of myocardial infarction 02/06/2019    Hospital discharge follow-up 05/31/2019    Insomnia 02/06/2019    Iron deficiency anemia 10/02/2018    Major depression, recurrent 10/16/2012    Anemia, chronic renal failure, stage 3 (moderate) 02/27/2020    Essential hypertension 03/10/2020    Adverse effect of iron and its compounds, initial encounter 05/23/2020    Acute on chronic renal insufficiency 05/25/2020    Debility 05/25/2020    Falls frequently 05/25/2020    Acute pain of right shoulder 05/27/2020    Acute on chronic anemia 05/25/2020    Heme positive stool 05/25/2020    Neurogenic orthostatic hypotension 05/30/2020    Anemia, chronic disease 05/25/2020    History of colon polyps 05/25/2020    Helicobacter pylori gastritis 06/04/2020    Esophagitis 06/10/2020    Sleep related hypoxia 07/23/2020    Embolic stroke 05/20/2021    Patent foramen ovale 05/22/2021    Pleural effusion, bilateral 05/22/2021    Cardiomyopathy 05/24/2021    Lower abdominal pain 12/13/2021    Diarrhea 12/14/2021    CKD (chronic kidney disease) stage 4, GFR 15-29 ml/min 12/16/2021    Hypertension not at goal 02/02/2022    Memory loss due to medical condition 02/08/2022    Generalized weakness 03/01/2022    ERRONEOUS ENCOUNTER--DISREGARD 03/09/2022    Weakness 06/03/2022    Paroxysmal atrial fibrillation 07/25/2022    Chronic anticoagulation 07/25/2022    Multiple falls 09/15/2022    Dehydration 09/16/2022    SYLVAIN (acute kidney injury) 09/16/2022    Acute ischemic stroke 09/17/2022    Acute combined systolic and diastolic congestive heart failure 05/02/2023    History of stroke 05/02/2023    Iron deficiency anemia 05/04/2023    Acute HFrEF (heart failure with reduced ejection fraction) 05/05/2023     Resolved Ambulatory Problems     Diagnosis Date Noted    Anemia     Arthritis     Diabetes  mellitus out of control     Acute sinusitis     Accumulation of fluid in tissues 12/17/2015    Fatigue 12/17/2015    Cardiomyopathy, ischemic 12/17/2015    Acute appendicitis 03/02/2016    Atherosclerosis of coronary artery 10/16/2012    Altered mental status 11/30/2017    Hypertensive encephalopathy syndrome 04/30/2018    Hyperglycemia 05/04/2018    Screening for colorectal cancer 08/22/2018    Constipation 08/22/2018    Snoring 12/13/2018    Lower abdominal pain 05/27/2020    Hypertensive emergency 02/01/2022    DE LEON (dyspnea on exertion) 03/07/2023    Shortness of breath 03/08/2023     Past Medical History:   Diagnosis Date    Acute congestive heart failure     CAD (coronary artery disease)     Cancer     Chronic combined systolic and diastolic congestive heart failure     Chronic ischemic heart disease     Chronic kidney disease (CKD)     CKD (chronic kidney disease)     COPD (chronic obstructive pulmonary disease)     Depression     Diabetes mellitus type 2, uncontrolled, without complications     Diabetic neuropathy     Disease of thyroid gland     Elevated cholesterol     Frequent PVCs     GERD (gastroesophageal reflux disease)     Heart attack     History of coronary artery disease     Hyperlipidemia     Hypertension     Hypertensive encephalopathy     Mental status change     NO DEVICE/RECALLED     Poor historian     RBBB (right bundle branch block)     Stroke     TIA (transient ischemic attack)     Type 2 diabetes mellitus          PAST SURGICAL HISTORY  Past Surgical History:   Procedure Laterality Date    APPENDECTOMY N/A 02/14/2016    Dr. Alexey Dodson    ARTERIOVENOUS FISTULA/SHUNT SURGERY Right 06/03/2022    Procedure: RIGHT FOREARM ARTERIOVENOUS FISTULA PLACEMENT RADIOCEPHALIC WITH CEPHALIC VEIN TRANSPOSITION;  Surgeon: Eliseo Willis MD;  Location: [facility];  Service: Vascular;  Laterality: Right;    COLONOSCOPY      over 20 years ago.  no polyps     COLONOSCOPY N/A 09/18/2018     Procedure: COLONOSCOPY;  Surgeon: Jose Enrique Louie MD;  Location:  [facility];  Service: Gastroenterology    COLONOSCOPY N/A 2018    IH, EH, polyps (TAs w/low grade dysplasia)    COLONOSCOPY N/A 2020    Procedure: COLONOSCOPY;  Surgeon: Jose Enrique Louie MD;  Location:  [facility];  Service: Gastroenterology;  Laterality: N/A;  Pre op-Anemia, Melena, History of Polyps  Post op-To Cecum/TI, Polyp, Poor Prep    CORONARY ARTERY BYPASS GRAFT  2001    ENDOSCOPY N/A 2020    Procedure: ESOPHAGOGASTRODUODENOSCOPY with biopsies;  Surgeon: Amanda Lovelace MD;  Location:  [facility];  Service: Gastroenterology;  Laterality: N/A;  pre-anemia, dark stools  post-esophagitis, hiatal hernia    ENDOSCOPY N/A 09/15/2020    Procedure: ESOPHAGOGASTRODUODENOSCOPY WITH BIOPSIES;  Surgeon: Jose Enrique Louie MD;  Location: [facility];  Service: Gastroenterology;  Laterality: N/A;  pre: history of melena and esophagitis  post: mild esophagitis and gastritis, small hiatal hernia    HERNIA REPAIR Left 2019    THORACENTESIS      TONSILLECTOMY      VASECTOMY           FAMILY HISTORY  Family History   Problem Relation Age of Onset    Diabetes Mother     Hypertension Mother     Macular degeneration Mother     Alcohol abuse Father     Cancer Father         lung,  age 65    Heart disease Father     Alcohol abuse Brother     Cirrhosis Brother          age 50    Liver cancer Brother 45    Diabetes Son     Kidney failure Son     Autism Son     Malig Hyperthermia Neg Hx          SOCIAL HISTORY  Social History     Socioeconomic History    Marital status:      Spouse name: Lissette    Number of children: 3    Years of education: college   Tobacco Use    Smoking status: Never    Smokeless tobacco: Never    Tobacco comments:     CAFFEINE - OCCAS. SODA   Vaping Use    Vaping Use: Never used   Substance and Sexual Activity    Alcohol use: No     Comment: last drink 2 months ago     Drug use: No    Sexual activity: Defer         ALLERGIES  Prozac [fluoxetine hcl]        REVIEW OF SYSTEMS  Review of Systems   Review of all 14 systems is negative other than stated in the HPI above.      PHYSICAL EXAM  ED Triage Vitals [11/24/23 1758]   Temp Heart Rate Resp BP SpO2   98 °F (36.7 °C) 70 18 160/75 99 %      Temp src Heart Rate Source Patient Position BP Location FiO2 (%)   -- -- -- -- --       Physical Exam      GENERAL: Awake and alert, ungroomed appearance, no acute distress  HENT: nares patent  EYES: no scleral icterus  CV: regular rhythm, normal rate, dialysis fistula right forearm with palpable thrill  RESPIRATORY: normal effort, lungs clear auscultation bilaterally  ABDOMEN: soft, mild right lower quadrant tenderness without rebound or guarding, abdomen nondistended  MUSCULOSKELETAL: no deformity  NEURO: alert, moves all extremities, follows commands, cranial nerves II through XII grossly intact, speech fluent and clear  PSYCH:  calm, cooperative  SKIN: warm, dry    Vital signs and nursing notes reviewed.          LAB RESULTS  Recent Results (from the past 24 hour(s))   Comprehensive Metabolic Panel    Collection Time: 11/24/23  6:36 PM    Specimen: Blood   Result Value Ref Range    Glucose 323 (H) 65 - 99 mg/dL    BUN 44 (H) 8 - 23 mg/dL    Creatinine 3.70 (H) 0.76 - 1.27 mg/dL    Sodium 133 (L) 136 - 145 mmol/L    Potassium 3.7 3.5 - 5.2 mmol/L    Chloride 98 98 - 107 mmol/L    CO2 22.9 22.0 - 29.0 mmol/L    Calcium 9.1 8.6 - 10.5 mg/dL    Total Protein 6.4 6.0 - 8.5 g/dL    Albumin 4.0 3.5 - 5.2 g/dL    ALT (SGPT) 17 1 - 41 U/L    AST (SGOT) 22 1 - 40 U/L    Alkaline Phosphatase 74 39 - 117 U/L    Total Bilirubin 0.4 0.0 - 1.2 mg/dL    Globulin 2.4 gm/dL    A/G Ratio 1.7 g/dL    BUN/Creatinine Ratio 11.9 7.0 - 25.0    Anion Gap 12.1 5.0 - 15.0 mmol/L    eGFR 17.1 (L) >60.0 mL/min/1.73   Lipase    Collection Time: 11/24/23  6:36 PM    Specimen: Blood   Result Value Ref Range    Lipase 31  13 - 60 U/L   Phosphorus    Collection Time: 11/24/23  6:36 PM    Specimen: Blood   Result Value Ref Range    Phosphorus 3.2 2.5 - 4.5 mg/dL   CBC Auto Differential    Collection Time: 11/24/23  6:36 PM    Specimen: Blood   Result Value Ref Range    WBC 10.01 3.40 - 10.80 10*3/mm3    RBC 3.99 (L) 4.14 - 5.80 10*6/mm3    Hemoglobin 10.7 (L) 13.0 - 17.7 g/dL    Hematocrit 34.6 (L) 37.5 - 51.0 %    MCV 86.7 79.0 - 97.0 fL    MCH 26.8 26.6 - 33.0 pg    MCHC 30.9 (L) 31.5 - 35.7 g/dL    RDW 12.9 12.3 - 15.4 %    RDW-SD 40.0 37.0 - 54.0 fl    MPV 11.4 6.0 - 12.0 fL    Platelets 152 140 - 450 10*3/mm3    Neutrophil % 89.6 (H) 42.7 - 76.0 %    Lymphocyte % 4.1 (L) 19.6 - 45.3 %    Monocyte % 5.8 5.0 - 12.0 %    Eosinophil % 0.0 (L) 0.3 - 6.2 %    Basophil % 0.1 0.0 - 1.5 %    Immature Grans % 0.4 0.0 - 0.5 %    Neutrophils, Absolute 8.97 (H) 1.70 - 7.00 10*3/mm3    Lymphocytes, Absolute 0.41 (L) 0.70 - 3.10 10*3/mm3    Monocytes, Absolute 0.58 0.10 - 0.90 10*3/mm3    Eosinophils, Absolute 0.00 0.00 - 0.40 10*3/mm3    Basophils, Absolute 0.01 0.00 - 0.20 10*3/mm3    Immature Grans, Absolute 0.04 0.00 - 0.05 10*3/mm3    nRBC 0.0 0.0 - 0.2 /100 WBC       Ordered the above labs and reviewed the results.        RADIOLOGY  No Radiology Exams Resulted Within Past 24 Hours    Ordered the above noted radiological studies. Reviewed by me in PACS.            PROCEDURES  Procedures              MEDICATIONS GIVEN IN ER  Medications   ondansetron (ZOFRAN) injection 4 mg (4 mg Intravenous Given 11/24/23 1857)   morphine injection 4 mg (4 mg Intravenous Given 11/24/23 1858)   sodium chloride 0.9 % bolus 250 mL (0 mL Intravenous Stopped 11/24/23 2138)   insulin regular (humuLIN R,novoLIN R) injection 10 Units (10 Units Intravenous Given 11/24/23 2056)                   MEDICAL DECISION MAKING, PROGRESS, and CONSULTS    All labs have been independently reviewed by me.  All radiology studies have been reviewed by me and I have also reviewed  the radiology report.   EKG's independently viewed and interpreted by me.  Discussion below represents my analysis of pertinent findings related to patient's condition, differential diagnosis, treatment plan and final disposition.      Additional sources:  - Discussed/ obtained information from independent historians: EMS    - External (non-ED) record review: Patient seen in the ED yesterday after he had missed dialysis.  Patient has a history of recurrent strokes, CAD status post CABG, diabetes, CKD, chronic anemia.    - Chronic or social conditions impacting care: Noncompliance with dialysis          Orders placed during this visit:  Orders Placed This Encounter   Procedures    CT Abdomen Pelvis Without Contrast    Comprehensive Metabolic Panel    Lipase    Phosphorus    CBC Auto Differential    Pulse Oximetry, Continuous    Monitor Blood Pressure    POC Glucose Once    CBC & Differential             Differential diagnosis includes but is not limited to:    Gastroenteritis  Viral syndrome  Colitis  Diverticulitis  Appendicitis        Independent interpretation of labs, radiology studies, and discussions with consultants:  ED Course as of 11/27/23 0826 Fri Nov 24, 2023 1922 Creatinine(!): 3.70 [JR]   1922 BUN(!): 44 [JR]   1922 Potassium: 3.7 [JR]   1922 Hemoglobin(!): 10.7 [JR]   2000 Patient history, ER presentation and evaluation to this point discussed with and care assumed from Dr. Gillis. [HEENA]   2012 Patient has moderate right lower quadrant tenderness on exam.  I have ordered CT abdomen pelvis for further evaluation.  If CT is reassuring and he is tolerating p.o., I think he can be safely discharged home with outpatient hemodialysis tomorrow as scheduled.  Care transferred to Klever Enciso PA-C at this time. [JR]   2028 Glucose(!): 323 [HEENA]   2203 Glucose: 109 [HEENA]   2209 Patient rechecked, CT scan results discussed with the patient as well as his labs.  There is no acute abnormality on the CT scan and  his glucose has improved from 3 23-1 09.  Patient requesting discharge at this time.  Have discussed return to ER precautions, patient expressed understanding and agrees to plan. [HEENA]      ED Course User Index  [HEENA] Klever Jane PA  [JR] Wilner Gillis MD           DIAGNOSIS  Final diagnoses:   Nausea, vomiting, and diarrhea   Type 2 diabetes mellitus with hyperglycemia, with long-term current use of insulin   ESRD on hemodialysis   Hypertension, unspecified type   Hyperlipidemia, unspecified hyperlipidemia type         DISPOSITION  DISCHARGE    Patient discharged in stable condition.    Reviewed implications of results, diagnosis, meds, responsibility to follow up, warning signs and symptoms of possible worsening, potential complications and reasons to return to ER.    Patient/Family voiced understanding of above instructions.    Discussed plan for discharge, as there is no emergent indication for admission. Patient referred to primary care provider for BP management due to today's BP. Pt/family is agreeable and understands need for follow up and repeat testing.  Pt is aware that discharge does not mean that nothing is wrong but it indicates no emergency is present that requires admission and they must continue care with follow-up as given below or physician of their choice.     FOLLOW-UP  Florencia Caballero MD  [address] [facility]  [facility] [address]  [phone]    Schedule an appointment as soon as possible for a visit       Nima Huddleston MD  [address]  [facility] [address]  [phone]    Schedule an appointment as soon as possible for a visit            Medication List        New Prescriptions      ondansetron ODT 4 MG disintegrating tablet  Commonly known as: ZOFRAN-ODT  Place 1 tablet on the tongue Every 8 (Eight) Hours As Needed for Nausea or Vomiting.               Where to Get Your Medications        These medications were sent to [facility] -  [place], [place] - [address] - [phone]  - [phone] FX  [address], [place] [address]      Phone: [phone]   ondansetron ODT 4 MG disintegrating tablet                   Latest Documented Vital Signs:  As of 08:26 EST  BP- 149/76 HR- 108 Temp- 98 °F (36.7 °C) O2 sat- 97%              --    Please note that portions of this were completed with a voice recognition program.       Note Disclaimer: At [facility], we believe that sharing information builds trust and better relationships. You are receiving this note because you are receiving care at [facility] or recently visited. It is possible you will see health information before a provider has talked with you about it. This kind of information can be easy to misunderstand. To help you fully understand what it means for your health, we urge you to discuss this note with your provider.             Wilner Gillis MD  11/27/23 0858

## 2023-11-25 NOTE — ED PROVIDER NOTES
EMERGENCY DEPARTMENT ENCOUNTER    Room number:  32/32  Date Seen:  11/24/2023  Time of transfer: 20:00  PCP:  Florencia Caballero MD    Laboratory Results:  Recent Results (from the past 24 hour(s))   Comprehensive Metabolic Panel    Collection Time: 11/24/23  6:36 PM    Specimen: Blood   Result Value Ref Range    Glucose 323 (H) 65 - 99 mg/dL    BUN 44 (H) 8 - 23 mg/dL    Creatinine 3.70 (H) 0.76 - 1.27 mg/dL    Sodium 133 (L) 136 - 145 mmol/L    Potassium 3.7 3.5 - 5.2 mmol/L    Chloride 98 98 - 107 mmol/L    CO2 22.9 22.0 - 29.0 mmol/L    Calcium 9.1 8.6 - 10.5 mg/dL    Total Protein 6.4 6.0 - 8.5 g/dL    Albumin 4.0 3.5 - 5.2 g/dL    ALT (SGPT) 17 1 - 41 U/L    AST (SGOT) 22 1 - 40 U/L    Alkaline Phosphatase 74 39 - 117 U/L    Total Bilirubin 0.4 0.0 - 1.2 mg/dL    Globulin 2.4 gm/dL    A/G Ratio 1.7 g/dL    BUN/Creatinine Ratio 11.9 7.0 - 25.0    Anion Gap 12.1 5.0 - 15.0 mmol/L    eGFR 17.1 (L) >60.0 mL/min/1.73   Lipase    Collection Time: 11/24/23  6:36 PM    Specimen: Blood   Result Value Ref Range    Lipase 31 13 - 60 U/L   Phosphorus    Collection Time: 11/24/23  6:36 PM    Specimen: Blood   Result Value Ref Range    Phosphorus 3.2 2.5 - 4.5 mg/dL   CBC Auto Differential    Collection Time: 11/24/23  6:36 PM    Specimen: Blood   Result Value Ref Range    WBC 10.01 3.40 - 10.80 10*3/mm3    RBC 3.99 (L) 4.14 - 5.80 10*6/mm3    Hemoglobin 10.7 (L) 13.0 - 17.7 g/dL    Hematocrit 34.6 (L) 37.5 - 51.0 %    MCV 86.7 79.0 - 97.0 fL    MCH 26.8 26.6 - 33.0 pg    MCHC 30.9 (L) 31.5 - 35.7 g/dL    RDW 12.9 12.3 - 15.4 %    RDW-SD 40.0 37.0 - 54.0 fl    MPV 11.4 6.0 - 12.0 fL    Platelets 152 140 - 450 10*3/mm3    Neutrophil % 89.6 (H) 42.7 - 76.0 %    Lymphocyte % 4.1 (L) 19.6 - 45.3 %    Monocyte % 5.8 5.0 - 12.0 %    Eosinophil % 0.0 (L) 0.3 - 6.2 %    Basophil % 0.1 0.0 - 1.5 %    Immature Grans % 0.4 0.0 - 0.5 %    Neutrophils, Absolute 8.97 (H) 1.70 - 7.00 10*3/mm3    Lymphocytes, Absolute 0.41 (L) 0.70 - 3.10  10*3/mm3    Monocytes, Absolute 0.58 0.10 - 0.90 10*3/mm3    Eosinophils, Absolute 0.00 0.00 - 0.40 10*3/mm3    Basophils, Absolute 0.01 0.00 - 0.20 10*3/mm3    Immature Grans, Absolute 0.04 0.00 - 0.05 10*3/mm3    nRBC 0.0 0.0 - 0.2 /100 WBC   POC Glucose Once    Collection Time: 11/24/23 10:01 PM    Specimen: Blood   Result Value Ref Range    Glucose 109 70 - 130 mg/dL     I reviewed the above results.    Radiology:  CT Abdomen Pelvis Without Contrast   Final Result       1. No acute intra-abdominal or intrapelvic process seen.   2. Patchy infiltrates noted at the lung bases bilaterally. Correlation   with any evidence of pneumonia is recommended.       Radiation dose reduction techniques were utilized, including automated   exposure control and exposure modulation based on body size.           This report was finalized on 11/24/2023 9:45 PM by Dr. Alice Allen M.D on Workstation: [id number]            I reviewed the above results    Medications ordered in ED:  Medications   sodium chloride 0.9 % flush 10 mL (10 mL Intravenous Given 11/24/23 1857)   ondansetron (ZOFRAN) injection 4 mg (4 mg Intravenous Given 11/24/23 1857)   morphine injection 4 mg (4 mg Intravenous Given 11/24/23 1858)   sodium chloride 0.9 % bolus 250 mL (0 mL Intravenous Stopped 11/24/23 2138)   insulin regular (humuLIN R,novoLIN R) injection 10 Units (10 Units Intravenous Given 11/24/23 2056)       ED Course as of 11/24/23 2222 Fri Nov 24, 2023 1922 Creatinine(!): 3.70 [JR]   1922 BUN(!): 44 [JR]   1922 Potassium: 3.7 [JR]   1922 Hemoglobin(!): 10.7 [JR]   2000 Patient history, ER presentation and evaluation to this point discussed with and care assumed from Dr. Gillis. [HEENA]   2012 Patient has moderate right lower quadrant tenderness on exam.  I have ordered CT abdomen pelvis for further evaluation.  If CT is reassuring and he is tolerating p.o., I think he can be safely discharged home with outpatient hemodialysis tomorrow as  scheduled.  Care transferred to Klever Enciso PA-C at this time. [JR]   2028 Glucose(!): 323 [HEENA]   2203 Glucose: 109 [HEENA]   2209 Patient rechecked, CT scan results discussed with the patient as well as his labs.  There is no acute abnormality on the CT scan and his glucose has improved from 3 23-1 09.  Patient requesting discharge at this time.  Have discussed return to ER precautions, patient expressed understanding and agrees to plan. [HEENA]      ED Course User Index  [HEENA] Klever Jane PA  [JR] Wilner Gillis MD       Progress and Consult Notes:  20:00:  Patient care transferred from Dr. Gillis pending CT results and disposition.    MDM: Patient's glucose improved after short bolus of fluids and insulin.  The rest of his electrolytes were unremarkable and CT scan shows no acute abnormality.  Patient has been tolerating p.o. here and after a shared decision-making discussion the patient is comfortable with discharge home, and is requesting discharge at this time.  Patient has been given strict return to ER precautions, vital signs are stable, patient is safe for discharge home.    Diagnosis:  Final diagnoses:   Nausea, vomiting, and diarrhea   Type 2 diabetes mellitus with hyperglycemia, with long-term current use of insulin   ESRD on hemodialysis   Hypertension, unspecified type   Hyperlipidemia, unspecified hyperlipidemia type       Disposition:  DISCHARGE    Patient discharged in stable condition.    Reviewed implications of results, diagnosis, meds, responsibility to follow up, warning signs and symptoms of possible worsening, potential complications and reasons to return to ER.    Patient/Family voiced understanding of above instructions.    Discussed plan for discharge, as there is no emergent indication for admission. Patient referred to primary care provider for BP management due to today's BP. Pt/family is agreeable and understands need for follow up and repeat testing.  Pt is aware that  discharge does not mean that nothing is wrong but it indicates no emergency is present that requires admission and they must continue care with follow-up as given below or physician of their choice.     FOLLOW-UP  Florencia Caballero MD  [address] [facility] [address]  [facility] [id number] [phone]    Schedule an appointment as soon as possible for a visit       Nima Huddleston MD  [address]  [facility] [address]  [facility] [id number] [phone]    Schedule an appointment as soon as possible for a visit            Medication List        New Prescriptions      ondansetron ODT 4 MG disintegrating tablet  Commonly known as: ZOFRAN-ODT  Place 1 tablet on the tongue Every 8 (Eight) Hours As Needed for Nausea or Vomiting.               Where to Get Your Medications        These medications were sent to [facility] - [place], [place] - [address] [facility] - [phone]  - [phone] FX  [address] [facility] [address] [address]      Phone: [phone]   ondansetron ODT 4 MG disintegrating tablet             Provider attestation:  I personally reviewed the past medical history, past surgical history, social history, family history, current medications, and allergies as they appear in the chart.    The patient was seen and examined by myself and Dr. Gillis, who agrees with plan.          Klever Jane PA  11/24/23 6885

## 2023-11-25 NOTE — SIGNIFICANT NOTE
06/01/20 0949   Rehab Treatment   Discipline physical therapist   Reason Treatment Not Performed unavailable for treatment  (Pt off floor - endoscopy this morning)     
   06/01/20 1402   Rehab Treatment   Discipline physical therapist   Reason Treatment Not Performed patient/family declined treatment, not feeling well  (Pt back on floor following procedure but exhausted and declining PT today. Nurse Steffany notified)   Recommendation   PT - Next Appointment 06/02/20     
No

## 2023-11-27 LAB
QT INTERVAL: 421 MS
QTC INTERVAL: 498 MS

## 2023-11-28 ENCOUNTER — TRANSCRIBE ORDERS (OUTPATIENT)
Dept: HOME HEALTH SERVICES | Facility: HOME HEALTHCARE | Age: 68
End: 2023-11-28
Payer: COMMERCIAL

## 2023-11-28 ENCOUNTER — HOSPITAL ENCOUNTER (INPATIENT)
Facility: HOSPITAL | Age: 68
LOS: 12 days | Discharge: NURSING, REHAB OR LONG-TERM CARE | DRG: 280 | End: 2023-12-11
Attending: EMERGENCY MEDICINE | Admitting: INTERNAL MEDICINE
Payer: MEDICARE

## 2023-11-28 ENCOUNTER — APPOINTMENT (OUTPATIENT)
Dept: GENERAL RADIOLOGY | Facility: HOSPITAL | Age: 68
DRG: 280 | End: 2023-11-28
Payer: MEDICARE

## 2023-11-28 DIAGNOSIS — N18.6 END STAGE RENAL DISEASE ON DIALYSIS: ICD-10-CM

## 2023-11-28 DIAGNOSIS — K92.0 COFFEE GROUND EMESIS: ICD-10-CM

## 2023-11-28 DIAGNOSIS — K92.2 UPPER GI BLEED: ICD-10-CM

## 2023-11-28 DIAGNOSIS — D68.9 COAGULOPATHY: ICD-10-CM

## 2023-11-28 DIAGNOSIS — N18.6 END STAGE RENAL DISEASE: Primary | ICD-10-CM

## 2023-11-28 DIAGNOSIS — Z99.2 END STAGE RENAL DISEASE ON DIALYSIS: ICD-10-CM

## 2023-11-28 DIAGNOSIS — R11.2 NAUSEA AND VOMITING, UNSPECIFIED VOMITING TYPE: ICD-10-CM

## 2023-11-28 DIAGNOSIS — I48.91 ATRIAL FIBRILLATION WITH RAPID VENTRICULAR RESPONSE: Primary | ICD-10-CM

## 2023-11-28 LAB
ALBUMIN SERPL-MCNC: 3.9 G/DL (ref 3.5–5.2)
ALBUMIN/GLOB SERPL: 1.2 G/DL
ALP SERPL-CCNC: 81 U/L (ref 39–117)
ALT SERPL W P-5'-P-CCNC: 31 U/L (ref 1–41)
ANION GAP SERPL CALCULATED.3IONS-SCNC: 16.7 MMOL/L (ref 5–15)
ARTERIAL PATENCY WRIST A: ABNORMAL
AST SERPL-CCNC: 27 U/L (ref 1–40)
ATMOSPHERIC PRESS: 756.4 MMHG
BASE EXCESS BLDA CALC-SCNC: 4.4 MMOL/L (ref 0–2)
BASOPHILS # BLD AUTO: 0.01 10*3/MM3 (ref 0–0.2)
BASOPHILS NFR BLD AUTO: 0.2 % (ref 0–1.5)
BDY SITE: ABNORMAL
BILIRUB SERPL-MCNC: 0.4 MG/DL (ref 0–1.2)
BUN SERPL-MCNC: 28 MG/DL (ref 8–23)
BUN/CREAT SERPL: 10.2 (ref 7–25)
CALCIUM SPEC-SCNC: 9.5 MG/DL (ref 8.6–10.5)
CHLORIDE SERPL-SCNC: 97 MMOL/L (ref 98–107)
CO2 BLDA-SCNC: 27.1 MMOL/L (ref 23–27)
CO2 SERPL-SCNC: 26.3 MMOL/L (ref 22–29)
CREAT SERPL-MCNC: 2.74 MG/DL (ref 0.76–1.27)
D-LACTATE SERPL-SCNC: 3.5 MMOL/L (ref 0.5–2)
DEPRECATED RDW RBC AUTO: 40.8 FL (ref 37–54)
DEVICE COMMENT: ABNORMAL
EGFRCR SERPLBLD CKD-EPI 2021: 24.5 ML/MIN/1.73
EOSINOPHIL # BLD AUTO: 0.01 10*3/MM3 (ref 0–0.4)
EOSINOPHIL NFR BLD AUTO: 0.2 % (ref 0.3–6.2)
ERYTHROCYTE [DISTWIDTH] IN BLOOD BY AUTOMATED COUNT: 13.1 % (ref 12.3–15.4)
GLOBULIN UR ELPH-MCNC: 3.3 GM/DL
GLUCOSE SERPL-MCNC: 243 MG/DL (ref 65–99)
HCO3 BLDA-SCNC: 26.2 MMOL/L (ref 22–28)
HCT VFR BLD AUTO: 37.4 % (ref 37.5–51)
HEMODILUTION: NO
HGB BLD-MCNC: 11.3 G/DL (ref 13–17.7)
HOLD SPECIMEN: NORMAL
HOLD SPECIMEN: NORMAL
IMM GRANULOCYTES # BLD AUTO: 0.13 10*3/MM3 (ref 0–0.05)
IMM GRANULOCYTES NFR BLD AUTO: 2 % (ref 0–0.5)
INR PPP: 1.18 (ref 0.9–1.1)
LIPASE SERPL-CCNC: 27 U/L (ref 13–60)
LYMPHOCYTES # BLD AUTO: 0.45 10*3/MM3 (ref 0.7–3.1)
LYMPHOCYTES NFR BLD AUTO: 6.8 % (ref 19.6–45.3)
Lab: ABNORMAL
MAGNESIUM SERPL-MCNC: 2 MG/DL (ref 1.6–2.4)
MCH RBC QN AUTO: 26.2 PG (ref 26.6–33)
MCHC RBC AUTO-ENTMCNC: 30.2 G/DL (ref 31.5–35.7)
MCV RBC AUTO: 86.8 FL (ref 79–97)
MODALITY: ABNORMAL
MONOCYTES # BLD AUTO: 0.5 10*3/MM3 (ref 0.1–0.9)
MONOCYTES NFR BLD AUTO: 7.5 % (ref 5–12)
NEUTROPHILS NFR BLD AUTO: 5.53 10*3/MM3 (ref 1.7–7)
NEUTROPHILS NFR BLD AUTO: 83.3 % (ref 42.7–76)
NOTIFIED WHO: ABNORMAL
NRBC BLD AUTO-RTO: 0 /100 WBC (ref 0–0.2)
NT-PROBNP SERPL-MCNC: ABNORMAL PG/ML (ref 0–900)
PCO2 BLDA: 29.6 MM HG (ref 35–45)
PH BLDA: 7.55 PH UNITS (ref 7.35–7.45)
PLATELET # BLD AUTO: 240 10*3/MM3 (ref 140–450)
PMV BLD AUTO: 10.9 FL (ref 6–12)
PO2 BLDA: 83.5 MM HG (ref 80–100)
POTASSIUM SERPL-SCNC: 4 MMOL/L (ref 3.5–5.2)
PROT SERPL-MCNC: 7.2 G/DL (ref 6–8.5)
PROTHROMBIN TIME: 15.1 SECONDS (ref 11.7–14.2)
RBC # BLD AUTO: 4.31 10*6/MM3 (ref 4.14–5.8)
READ BACK: ABNORMAL
SAO2 % BLDCOA: 97.7 % (ref 92–98.5)
SODIUM SERPL-SCNC: 140 MMOL/L (ref 136–145)
TOTAL RATE: 18 BREATHS/MINUTE
TROPONIN T SERPL HS-MCNC: 176 NG/L
WBC NRBC COR # BLD AUTO: 6.63 10*3/MM3 (ref 3.4–10.8)
WHOLE BLOOD HOLD COAG: NORMAL
WHOLE BLOOD HOLD SPECIMEN: NORMAL

## 2023-11-28 PROCEDURE — 83605 ASSAY OF LACTIC ACID: CPT | Performed by: EMERGENCY MEDICINE

## 2023-11-28 PROCEDURE — 85025 COMPLETE CBC W/AUTO DIFF WBC: CPT | Performed by: EMERGENCY MEDICINE

## 2023-11-28 PROCEDURE — 25810000003 SODIUM CHLORIDE 0.9 % SOLUTION: Performed by: EMERGENCY MEDICINE

## 2023-11-28 PROCEDURE — 82803 BLOOD GASES ANY COMBINATION: CPT

## 2023-11-28 PROCEDURE — 83735 ASSAY OF MAGNESIUM: CPT | Performed by: EMERGENCY MEDICINE

## 2023-11-28 PROCEDURE — 85610 PROTHROMBIN TIME: CPT | Performed by: EMERGENCY MEDICINE

## 2023-11-28 PROCEDURE — 80053 COMPREHEN METABOLIC PANEL: CPT | Performed by: EMERGENCY MEDICINE

## 2023-11-28 PROCEDURE — 93010 ELECTROCARDIOGRAM REPORT: CPT | Performed by: INTERNAL MEDICINE

## 2023-11-28 PROCEDURE — 36600 WITHDRAWAL OF ARTERIAL BLOOD: CPT

## 2023-11-28 PROCEDURE — 71045 X-RAY EXAM CHEST 1 VIEW: CPT

## 2023-11-28 PROCEDURE — 83690 ASSAY OF LIPASE: CPT | Performed by: EMERGENCY MEDICINE

## 2023-11-28 PROCEDURE — 99285 EMERGENCY DEPT VISIT HI MDM: CPT

## 2023-11-28 PROCEDURE — 84484 ASSAY OF TROPONIN QUANT: CPT | Performed by: EMERGENCY MEDICINE

## 2023-11-28 PROCEDURE — 93005 ELECTROCARDIOGRAM TRACING: CPT | Performed by: EMERGENCY MEDICINE

## 2023-11-28 PROCEDURE — 25010000002 ONDANSETRON PER 1 MG: Performed by: EMERGENCY MEDICINE

## 2023-11-28 PROCEDURE — 83880 ASSAY OF NATRIURETIC PEPTIDE: CPT | Performed by: EMERGENCY MEDICINE

## 2023-11-28 RX ORDER — SODIUM CHLORIDE 0.9 % (FLUSH) 0.9 %
10 SYRINGE (ML) INJECTION AS NEEDED
Status: DISCONTINUED | OUTPATIENT
Start: 2023-11-28 | End: 2023-12-11 | Stop reason: HOSPADM

## 2023-11-28 RX ORDER — ONDANSETRON 2 MG/ML
4 INJECTION INTRAMUSCULAR; INTRAVENOUS ONCE
Status: COMPLETED | OUTPATIENT
Start: 2023-11-28 | End: 2023-11-28

## 2023-11-28 RX ADMIN — SODIUM CHLORIDE 500 ML: 9 INJECTION, SOLUTION INTRAVENOUS at 21:33

## 2023-11-28 RX ADMIN — ONDANSETRON 4 MG: 2 INJECTION INTRAMUSCULAR; INTRAVENOUS at 22:09

## 2023-11-28 RX ADMIN — METOPROLOL TARTRATE 5 MG: 1 INJECTION, SOLUTION INTRAVENOUS at 22:09

## 2023-11-28 RX ADMIN — METOPROLOL TARTRATE 5 MG: 1 INJECTION, SOLUTION INTRAVENOUS at 21:34

## 2023-11-28 NOTE — Clinical Note
Level of Care: Telemetry [5]   Diagnosis: Atrial fibrillation with rapid ventricular response [906225]   Admitting Physician: CHRISTINA GOULD [[id number]]   Attending Physician: CHRISTINA GOULD [[id number]]   Certification: I Certify That Inpatient Hospital Services Are Medically Necessary For Greater Than 2 Midnights

## 2023-11-29 PROBLEM — I48.91 ATRIAL FIBRILLATION WITH RAPID VENTRICULAR RESPONSE: Status: ACTIVE | Noted: 2023-11-29

## 2023-11-29 LAB
ALBUMIN SERPL-MCNC: 3.2 G/DL (ref 3.5–5.2)
ALBUMIN/GLOB SERPL: 1.1 G/DL
ALP SERPL-CCNC: 66 U/L (ref 39–117)
ALT SERPL W P-5'-P-CCNC: 23 U/L (ref 1–41)
ANION GAP SERPL CALCULATED.3IONS-SCNC: 10.7 MMOL/L (ref 5–15)
AST SERPL-CCNC: 19 U/L (ref 1–40)
BASOPHILS # BLD AUTO: 0.01 10*3/MM3 (ref 0–0.2)
BASOPHILS NFR BLD AUTO: 0.2 % (ref 0–1.5)
BILIRUB SERPL-MCNC: 0.3 MG/DL (ref 0–1.2)
BUN SERPL-MCNC: 36 MG/DL (ref 8–23)
BUN/CREAT SERPL: 11.1 (ref 7–25)
CALCIUM SPEC-SCNC: 8.8 MG/DL (ref 8.6–10.5)
CHLORIDE SERPL-SCNC: 101 MMOL/L (ref 98–107)
CO2 SERPL-SCNC: 26.3 MMOL/L (ref 22–29)
CREAT SERPL-MCNC: 3.24 MG/DL (ref 0.76–1.27)
D-LACTATE SERPL-SCNC: 1.9 MMOL/L (ref 0.5–2)
DEPRECATED RDW RBC AUTO: 41.8 FL (ref 37–54)
DEPRECATED RDW RBC AUTO: 41.9 FL (ref 37–54)
EGFRCR SERPLBLD CKD-EPI 2021: 20 ML/MIN/1.73
EOSINOPHIL # BLD AUTO: 0 10*3/MM3 (ref 0–0.4)
EOSINOPHIL NFR BLD AUTO: 0 % (ref 0.3–6.2)
ERYTHROCYTE [DISTWIDTH] IN BLOOD BY AUTOMATED COUNT: 13.2 % (ref 12.3–15.4)
ERYTHROCYTE [DISTWIDTH] IN BLOOD BY AUTOMATED COUNT: 13.4 % (ref 12.3–15.4)
GEN 5 2HR TROPONIN T REFLEX: 157 NG/L
GLOBULIN UR ELPH-MCNC: 3 GM/DL
GLUCOSE BLDC GLUCOMTR-MCNC: 212 MG/DL (ref 70–130)
GLUCOSE BLDC GLUCOMTR-MCNC: 243 MG/DL (ref 70–130)
GLUCOSE BLDC GLUCOMTR-MCNC: 250 MG/DL (ref 70–130)
GLUCOSE BLDC GLUCOMTR-MCNC: 91 MG/DL (ref 70–130)
GLUCOSE SERPL-MCNC: 133 MG/DL (ref 65–99)
HBV SURFACE AG SERPL QL IA: NORMAL
HCT VFR BLD AUTO: 31.5 % (ref 37.5–51)
HCT VFR BLD AUTO: 31.5 % (ref 37.5–51)
HCT VFR BLD AUTO: 32 % (ref 37.5–51)
HCT VFR BLD AUTO: 36.1 % (ref 37.5–51)
HGB BLD-MCNC: 10 G/DL (ref 13–17.7)
HGB BLD-MCNC: 10 G/DL (ref 13–17.7)
HGB BLD-MCNC: 10.1 G/DL (ref 13–17.7)
HGB BLD-MCNC: 11.2 G/DL (ref 13–17.7)
IMM GRANULOCYTES # BLD AUTO: 0.05 10*3/MM3 (ref 0–0.05)
IMM GRANULOCYTES NFR BLD AUTO: 0.9 % (ref 0–0.5)
LYMPHOCYTES # BLD AUTO: 1.1 10*3/MM3 (ref 0.7–3.1)
LYMPHOCYTES NFR BLD AUTO: 20 % (ref 19.6–45.3)
MCH RBC QN AUTO: 27.4 PG (ref 26.6–33)
MCH RBC QN AUTO: 27.8 PG (ref 26.6–33)
MCHC RBC AUTO-ENTMCNC: 31.6 G/DL (ref 31.5–35.7)
MCHC RBC AUTO-ENTMCNC: 31.7 G/DL (ref 31.5–35.7)
MCV RBC AUTO: 87 FL (ref 79–97)
MCV RBC AUTO: 87.5 FL (ref 79–97)
MONOCYTES # BLD AUTO: 0.49 10*3/MM3 (ref 0.1–0.9)
MONOCYTES NFR BLD AUTO: 8.9 % (ref 5–12)
NEUTROPHILS NFR BLD AUTO: 3.86 10*3/MM3 (ref 1.7–7)
NEUTROPHILS NFR BLD AUTO: 70 % (ref 42.7–76)
NRBC BLD AUTO-RTO: 0 /100 WBC (ref 0–0.2)
PLATELET # BLD AUTO: 176 10*3/MM3 (ref 140–450)
PLATELET # BLD AUTO: 220 10*3/MM3 (ref 140–450)
PMV BLD AUTO: 11.1 FL (ref 6–12)
PMV BLD AUTO: 12.9 FL (ref 6–12)
POTASSIUM SERPL-SCNC: 3.2 MMOL/L (ref 3.5–5.2)
PROT SERPL-MCNC: 6.2 G/DL (ref 6–8.5)
QT INTERVAL: 299 MS
QTC INTERVAL: 508 MS
RBC # BLD AUTO: 3.6 10*6/MM3 (ref 4.14–5.8)
RBC # BLD AUTO: 3.68 10*6/MM3 (ref 4.14–5.8)
SODIUM SERPL-SCNC: 138 MMOL/L (ref 136–145)
TROPONIN T DELTA: -19 NG/L
TROPONIN T SERPL HS-MCNC: 155 NG/L
TSH SERPL DL<=0.05 MIU/L-ACNC: 1.64 UIU/ML (ref 0.27–4.2)
WBC NRBC COR # BLD AUTO: 5.39 10*3/MM3 (ref 3.4–10.8)
WBC NRBC COR # BLD AUTO: 5.51 10*3/MM3 (ref 3.4–10.8)

## 2023-11-29 PROCEDURE — 85027 COMPLETE CBC AUTOMATED: CPT | Performed by: NURSE PRACTITIONER

## 2023-11-29 PROCEDURE — 94760 N-INVAS EAR/PLS OXIMETRY 1: CPT

## 2023-11-29 PROCEDURE — 82948 REAGENT STRIP/BLOOD GLUCOSE: CPT

## 2023-11-29 PROCEDURE — 83605 ASSAY OF LACTIC ACID: CPT | Performed by: EMERGENCY MEDICINE

## 2023-11-29 PROCEDURE — 94640 AIRWAY INHALATION TREATMENT: CPT

## 2023-11-29 PROCEDURE — 85018 HEMOGLOBIN: CPT | Performed by: INTERNAL MEDICINE

## 2023-11-29 PROCEDURE — 25010000002 ONDANSETRON PER 1 MG: Performed by: EMERGENCY MEDICINE

## 2023-11-29 PROCEDURE — 99222 1ST HOSP IP/OBS MODERATE 55: CPT | Performed by: INTERNAL MEDICINE

## 2023-11-29 PROCEDURE — 94799 UNLISTED PULMONARY SVC/PX: CPT

## 2023-11-29 PROCEDURE — 85014 HEMATOCRIT: CPT | Performed by: INTERNAL MEDICINE

## 2023-11-29 PROCEDURE — 84443 ASSAY THYROID STIM HORMONE: CPT | Performed by: INTERNAL MEDICINE

## 2023-11-29 PROCEDURE — 25810000003 SODIUM CHLORIDE 0.9 % SOLUTION: Performed by: NURSE PRACTITIONER

## 2023-11-29 PROCEDURE — 87340 HEPATITIS B SURFACE AG IA: CPT | Performed by: INTERNAL MEDICINE

## 2023-11-29 PROCEDURE — 25010000002 POTASSIUM CHLORIDE 10 MEQ/100ML SOLUTION: Performed by: INTERNAL MEDICINE

## 2023-11-29 PROCEDURE — 84484 ASSAY OF TROPONIN QUANT: CPT | Performed by: EMERGENCY MEDICINE

## 2023-11-29 PROCEDURE — 63710000001 INSULIN REGULAR HUMAN PER 5 UNITS: Performed by: INTERNAL MEDICINE

## 2023-11-29 PROCEDURE — 80053 COMPREHEN METABOLIC PANEL: CPT | Performed by: INTERNAL MEDICINE

## 2023-11-29 PROCEDURE — 84484 ASSAY OF TROPONIN QUANT: CPT | Performed by: NURSE PRACTITIONER

## 2023-11-29 PROCEDURE — 36415 COLL VENOUS BLD VENIPUNCTURE: CPT | Performed by: INTERNAL MEDICINE

## 2023-11-29 PROCEDURE — 94761 N-INVAS EAR/PLS OXIMETRY MLT: CPT

## 2023-11-29 PROCEDURE — 85025 COMPLETE CBC W/AUTO DIFF WBC: CPT | Performed by: INTERNAL MEDICINE

## 2023-11-29 RX ORDER — IBUPROFEN 600 MG/1
1 TABLET ORAL
Status: DISCONTINUED | OUTPATIENT
Start: 2023-11-29 | End: 2023-12-11 | Stop reason: HOSPADM

## 2023-11-29 RX ORDER — POTASSIUM CHLORIDE 7.45 MG/ML
10 INJECTION INTRAVENOUS
Status: COMPLETED | OUTPATIENT
Start: 2023-11-29 | End: 2023-11-29

## 2023-11-29 RX ORDER — ONDANSETRON 2 MG/ML
4 INJECTION INTRAMUSCULAR; INTRAVENOUS EVERY 6 HOURS PRN
Status: DISCONTINUED | OUTPATIENT
Start: 2023-11-29 | End: 2023-11-29 | Stop reason: SDUPTHER

## 2023-11-29 RX ORDER — ONDANSETRON 2 MG/ML
4 INJECTION INTRAMUSCULAR; INTRAVENOUS ONCE
Status: COMPLETED | OUTPATIENT
Start: 2023-11-29 | End: 2023-11-29

## 2023-11-29 RX ORDER — ACETAMINOPHEN 650 MG/1
650 SUPPOSITORY RECTAL EVERY 4 HOURS PRN
Status: DISCONTINUED | OUTPATIENT
Start: 2023-11-29 | End: 2023-12-11 | Stop reason: HOSPADM

## 2023-11-29 RX ORDER — SODIUM CHLORIDE 0.9 % (FLUSH) 0.9 %
10 SYRINGE (ML) INJECTION AS NEEDED
Status: DISCONTINUED | OUTPATIENT
Start: 2023-11-29 | End: 2023-12-11 | Stop reason: HOSPADM

## 2023-11-29 RX ORDER — PANTOPRAZOLE SODIUM 40 MG/10ML
80 INJECTION, POWDER, LYOPHILIZED, FOR SOLUTION INTRAVENOUS ONCE
Status: DISCONTINUED | OUTPATIENT
Start: 2023-11-29 | End: 2023-11-29 | Stop reason: SDUPTHER

## 2023-11-29 RX ORDER — ACETAMINOPHEN 160 MG/5ML
650 SOLUTION ORAL EVERY 4 HOURS PRN
Status: DISCONTINUED | OUTPATIENT
Start: 2023-11-29 | End: 2023-12-11 | Stop reason: HOSPADM

## 2023-11-29 RX ORDER — NICOTINE POLACRILEX 4 MG
15 LOZENGE BUCCAL
Status: DISCONTINUED | OUTPATIENT
Start: 2023-11-29 | End: 2023-11-29 | Stop reason: SDUPTHER

## 2023-11-29 RX ORDER — NICOTINE POLACRILEX 4 MG
15 LOZENGE BUCCAL
Status: DISCONTINUED | OUTPATIENT
Start: 2023-11-29 | End: 2023-12-11 | Stop reason: HOSPADM

## 2023-11-29 RX ORDER — INSULIN LISPRO 100 [IU]/ML
2-7 INJECTION, SOLUTION INTRAVENOUS; SUBCUTANEOUS
Status: DISCONTINUED | OUTPATIENT
Start: 2023-11-29 | End: 2023-11-29

## 2023-11-29 RX ORDER — DEXTROSE MONOHYDRATE 25 G/50ML
25 INJECTION, SOLUTION INTRAVENOUS
Status: DISCONTINUED | OUTPATIENT
Start: 2023-11-29 | End: 2023-12-11 | Stop reason: HOSPADM

## 2023-11-29 RX ORDER — LEVOTHYROXINE SODIUM 0.07 MG/1
75 TABLET ORAL
Status: DISCONTINUED | OUTPATIENT
Start: 2023-11-29 | End: 2023-12-11 | Stop reason: HOSPADM

## 2023-11-29 RX ORDER — ONDANSETRON 4 MG/1
4 TABLET, FILM COATED ORAL EVERY 6 HOURS PRN
Status: DISCONTINUED | OUTPATIENT
Start: 2023-11-29 | End: 2023-11-29 | Stop reason: SDUPTHER

## 2023-11-29 RX ORDER — DEXTROSE MONOHYDRATE 25 G/50ML
25 INJECTION, SOLUTION INTRAVENOUS
Status: DISCONTINUED | OUTPATIENT
Start: 2023-11-29 | End: 2023-12-11 | Stop reason: SDUPTHER

## 2023-11-29 RX ORDER — IPRATROPIUM BROMIDE AND ALBUTEROL SULFATE 2.5; .5 MG/3ML; MG/3ML
3 SOLUTION RESPIRATORY (INHALATION) EVERY 6 HOURS PRN
Status: DISCONTINUED | OUTPATIENT
Start: 2023-11-29 | End: 2023-12-11 | Stop reason: HOSPADM

## 2023-11-29 RX ORDER — ONDANSETRON 2 MG/ML
4 INJECTION INTRAMUSCULAR; INTRAVENOUS EVERY 6 HOURS PRN
Status: DISCONTINUED | OUTPATIENT
Start: 2023-11-29 | End: 2023-12-11 | Stop reason: HOSPADM

## 2023-11-29 RX ORDER — ONDANSETRON 4 MG/1
4 TABLET, FILM COATED ORAL EVERY 6 HOURS PRN
Status: DISCONTINUED | OUTPATIENT
Start: 2023-11-29 | End: 2023-12-11 | Stop reason: HOSPADM

## 2023-11-29 RX ORDER — SODIUM CHLORIDE 0.9 % (FLUSH) 0.9 %
10 SYRINGE (ML) INJECTION EVERY 12 HOURS SCHEDULED
Status: DISCONTINUED | OUTPATIENT
Start: 2023-11-29 | End: 2023-12-11 | Stop reason: HOSPADM

## 2023-11-29 RX ORDER — BUDESONIDE AND FORMOTEROL FUMARATE DIHYDRATE 160; 4.5 UG/1; UG/1
2 AEROSOL RESPIRATORY (INHALATION)
Status: DISCONTINUED | OUTPATIENT
Start: 2023-11-29 | End: 2023-12-11 | Stop reason: HOSPADM

## 2023-11-29 RX ORDER — CALCIUM CARBONATE 500 MG/1
2 TABLET, CHEWABLE ORAL 2 TIMES DAILY PRN
Status: DISCONTINUED | OUTPATIENT
Start: 2023-11-29 | End: 2023-12-11 | Stop reason: HOSPADM

## 2023-11-29 RX ORDER — NITROGLYCERIN 0.4 MG/1
0.4 TABLET SUBLINGUAL
Status: DISCONTINUED | OUTPATIENT
Start: 2023-11-29 | End: 2023-11-29 | Stop reason: SDUPTHER

## 2023-11-29 RX ORDER — SODIUM CHLORIDE 9 MG/ML
40 INJECTION, SOLUTION INTRAVENOUS AS NEEDED
Status: DISCONTINUED | OUTPATIENT
Start: 2023-11-29 | End: 2023-12-11 | Stop reason: HOSPADM

## 2023-11-29 RX ORDER — SODIUM BICARBONATE 650 MG/1
650 TABLET ORAL 3 TIMES DAILY
Status: DISCONTINUED | OUTPATIENT
Start: 2023-11-29 | End: 2023-12-11 | Stop reason: HOSPADM

## 2023-11-29 RX ORDER — IBUPROFEN 600 MG/1
1 TABLET ORAL
Status: DISCONTINUED | OUTPATIENT
Start: 2023-11-29 | End: 2023-11-29 | Stop reason: SDUPTHER

## 2023-11-29 RX ORDER — ACETAMINOPHEN 325 MG/1
650 TABLET ORAL EVERY 4 HOURS PRN
Status: DISCONTINUED | OUTPATIENT
Start: 2023-11-29 | End: 2023-12-11 | Stop reason: HOSPADM

## 2023-11-29 RX ORDER — NITROGLYCERIN 0.4 MG/1
0.4 TABLET SUBLINGUAL
Status: DISCONTINUED | OUTPATIENT
Start: 2023-11-29 | End: 2023-12-11 | Stop reason: HOSPADM

## 2023-11-29 RX ORDER — PANTOPRAZOLE SODIUM 40 MG/10ML
80 INJECTION, POWDER, LYOPHILIZED, FOR SOLUTION INTRAVENOUS ONCE
Status: DISCONTINUED | OUTPATIENT
Start: 2023-11-29 | End: 2023-12-01

## 2023-11-29 RX ADMIN — SODIUM BICARBONATE 650 MG: 650 TABLET, ORALLY DISINTEGRATING ORAL at 20:39

## 2023-11-29 RX ADMIN — POTASSIUM CHLORIDE 10 MEQ: 7.46 INJECTION, SOLUTION INTRAVENOUS at 11:14

## 2023-11-29 RX ADMIN — Medication 10 ML: at 20:40

## 2023-11-29 RX ADMIN — POTASSIUM CHLORIDE 10 MEQ: 7.46 INJECTION, SOLUTION INTRAVENOUS at 12:41

## 2023-11-29 RX ADMIN — Medication 10 ML: at 08:10

## 2023-11-29 RX ADMIN — SODIUM CHLORIDE 40 MG: 9 INJECTION, SOLUTION INTRAVENOUS at 07:45

## 2023-11-29 RX ADMIN — SODIUM CHLORIDE 5 MG/HR: 900 INJECTION, SOLUTION INTRAVENOUS at 03:24

## 2023-11-29 RX ADMIN — PANTOPRAZOLE SODIUM 80 MG: 40 INJECTION, POWDER, FOR SOLUTION INTRAVENOUS at 00:57

## 2023-11-29 RX ADMIN — SODIUM CHLORIDE 500 ML: 9 INJECTION, SOLUTION INTRAVENOUS at 00:57

## 2023-11-29 RX ADMIN — SODIUM CHLORIDE 40 MG: 9 INJECTION, SOLUTION INTRAVENOUS at 12:42

## 2023-11-29 RX ADMIN — INSULIN HUMAN 4 UNITS: 100 INJECTION, SOLUTION PARENTERAL at 17:51

## 2023-11-29 RX ADMIN — SODIUM CHLORIDE 40 MG: 9 INJECTION, SOLUTION INTRAVENOUS at 04:32

## 2023-11-29 RX ADMIN — SODIUM CHLORIDE 40 MG: 9 INJECTION, SOLUTION INTRAVENOUS at 22:04

## 2023-11-29 RX ADMIN — SODIUM CHLORIDE 40 MG: 9 INJECTION, SOLUTION INTRAVENOUS at 17:09

## 2023-11-29 RX ADMIN — METOPROLOL TARTRATE 5 MG: 1 INJECTION, SOLUTION INTRAVENOUS at 00:36

## 2023-11-29 RX ADMIN — BUDESONIDE AND FORMOTEROL FUMARATE DIHYDRATE 2 PUFF: 160; 4.5 AEROSOL RESPIRATORY (INHALATION) at 19:34

## 2023-11-29 RX ADMIN — SODIUM CHLORIDE 5 MG/HR: 900 INJECTION, SOLUTION INTRAVENOUS at 04:32

## 2023-11-29 RX ADMIN — INSULIN HUMAN 3 UNITS: 100 INJECTION, SOLUTION PARENTERAL at 06:19

## 2023-11-29 RX ADMIN — Medication 10 ML: at 00:58

## 2023-11-29 RX ADMIN — SODIUM CHLORIDE 5 MG/HR: 900 INJECTION, SOLUTION INTRAVENOUS at 17:09

## 2023-11-29 RX ADMIN — BUDESONIDE AND FORMOTEROL FUMARATE DIHYDRATE 2 PUFF: 160; 4.5 AEROSOL RESPIRATORY (INHALATION) at 07:49

## 2023-11-29 RX ADMIN — ONDANSETRON 4 MG: 2 INJECTION INTRAMUSCULAR; INTRAVENOUS at 00:57

## 2023-11-29 NOTE — ED NOTES
Nursing report ED to floor  Carlito Mo  68 y.o.  male    HPI :   Chief Complaint   Patient presents with    Nausea     NV dislysis pt, seen here 48hr ago for same s/s. BG>300.       Admitting doctor:   Smith Henson MD    Admitting diagnosis:   The primary encounter diagnosis was Atrial fibrillation with rapid ventricular response. Diagnoses of Nausea and vomiting, unspecified vomiting type, End stage renal disease on dialysis, and Coagulopathy: Eliquis induced were also pertinent to this visit.    Code status:   Current Code Status       Date Active Code Status Order ID Comments User Context       11/29/2023 0049 CPR (Attempt to Resuscitate) [id number]  Amber Agudelo APRN ED        Question Answer    Code Status (Patient has no pulse and is not breathing) CPR (Attempt to Resuscitate)    Medical Interventions (Patient has pulse or is breathing) Full Support                    Allergies:   Prozac [fluoxetine hcl]    Isolation:   No active isolations    Intake and Output    Intake/Output Summary (Last 24 hours) at 11/29/2023 0052  Last data filed at 11/28/2023 2114  Gross per 24 hour   Intake 1499 ml   Output --   Net 1499 ml       Weight:   There were no vitals filed for this visit.    Most recent vitals:   Vitals:    11/29/23 0031 11/29/23 0036 11/29/23 0050 11/29/23 0051   BP: 119/100      BP Location:       Patient Position:       Pulse: (!) 151 (!) 165 (!) 133 (!) 157   Resp:       Temp:       TempSrc:       SpO2: 98% 96% 100% 100%       Active LDAs/IV Access:   Lines, Drains & Airways       Active LDAs       Name Placement date Placement time Site Days    Peripheral IV 11/28/23 2115 Left Antecubital 11/28/23 2115  Antecubital  less than 1                    Labs (abnormal labs have a star):   Labs Reviewed   LACTIC ACID, PLASMA - Abnormal; Notable for the following components:       Result Value    Lactate 3.5 (*)     All other components within normal limits   COMPREHENSIVE METABOLIC PANEL -  Abnormal; Notable for the following components:    Glucose 243 (*)     BUN 28 (*)     Creatinine 2.74 (*)     Chloride 97 (*)     Anion Gap 16.7 (*)     eGFR 24.5 (*)     All other components within normal limits    Narrative:     GFR Normal >60  Chronic Kidney Disease <60  Kidney Failure <15     CBC WITH AUTO DIFFERENTIAL - Abnormal; Notable for the following components:    Hemoglobin 11.3 (*)     Hematocrit 37.4 (*)     MCH 26.2 (*)     MCHC 30.2 (*)     Neutrophil % 83.3 (*)     Lymphocyte % 6.8 (*)     Eosinophil % 0.2 (*)     Immature Grans % 2.0 (*)     Lymphocytes, Absolute 0.45 (*)     Immature Grans, Absolute 0.13 (*)     All other components within normal limits   PROTIME-INR - Abnormal; Notable for the following components:    Protime 15.1 (*)     INR 1.18 (*)     All other components within normal limits   BNP (IN-HOUSE) - Abnormal; Notable for the following components:    proBNP 59,481.0 (*)     All other components within normal limits    Narrative:     This assay is used as an aid in the diagnosis of individuals suspected of having heart failure. It can be used as an aid in the diagnosis of acute decompensated heart failure (ADHF) in patients presenting with signs and symptoms of ADHF to the emergency department (ED). In addition, NT-proBNP of <300 pg/mL indicates ADHF is not likely.    Age Range Result Interpretation  NT-proBNP Concentration (pg/mL:      <50             Positive            >450                   Gray                 300-450                    Negative             <300    50-75           Positive            >900                  Gray                300-900                  Negative            <300      >75             Positive            >1800                  Gray                300-1800                  Negative            <300   TROPONIN - Abnormal; Notable for the following components:    HS Troponin T 176 (*)     All other components within normal limits    Narrative:     High  Sensitive Troponin T Reference Range:  <14.0 ng/L- Negative Female for AMI  <22.0 ng/L- Negative Male for AMI  >=14 - Abnormal Female indicating possible myocardial injury.  >=22 - Abnormal Male indicating possible myocardial injury.   Clinicians would have to utilize clinical acumen, EKG, Troponin, and serial changes to determine if it is an Acute Myocardial Infarction or myocardial injury due to an underlying chronic condition.        BLOOD GAS, ARTERIAL - Abnormal; Notable for the following components:    pH, Arterial 7.555 (*)     pCO2, Arterial 29.6 (*)     Base Excess, Arterial 4.4 (*)     CO2 Content 27.1 (*)     All other components within normal limits   LIPASE - Normal   MAGNESIUM - Normal   RAINBOW DRAW    Narrative:     The following orders were created for panel order [place] Draw.  Procedure                               Abnormality         Status                     ---------                               -----------         ------                     Green Top (Gel)[[id number]]                                  Final result               Lavender Top[[id number]]                                     Final result               Gold Top - SST[[id number]]                                   Final result               Light Blue Top[[id number]]                                   Final result                 Please view results for these tests on the individual orders.   BLOOD GAS, ARTERIAL   URINALYSIS W/ MICROSCOPIC IF INDICATED (NO CULTURE)   LACTIC ACID, REFLEX   HIGH SENSITIVITIY TROPONIN T 2HR   BASIC METABOLIC PANEL   CBC (NO DIFF)   TROPONIN   POCT GLUCOSE FINGERSTICK   POCT GLUCOSE FINGERSTICK   POCT GLUCOSE FINGERSTICK   POCT GLUCOSE FINGERSTICK   GREEN TOP   LAVENDER TOP   GOLD TOP - SST   LIGHT BLUE TOP   CBC AND DIFFERENTIAL    Narrative:     The following orders were created for panel order CBC & Differential.  Procedure                               Abnormality         Status                      ---------                               -----------         ------                     CBC Auto Differential[[id number]]        Abnormal            Final result                 Please view results for these tests on the individual orders.       EKG:   ECG 12 Lead Tachycardia   Preliminary Result   HEART RATE= 173  bpm   RR Interval= 347  ms   VT Interval=   ms   P Horizontal Axis=   deg   P Front Axis=   deg   QRSD Interval= 136  ms   QT Interval= 299  ms   QTcB= 508  ms   QRS Axis= -86  deg   T Wave Axis= 60  deg   - ABNORMAL ECG -   Wide-QRS tachycardia   Ventricular premature complex   RBBB and LAFB   Artifact in lead(s) I,II,III,aVR,aVL,V1,V2,V3,V4,V5,V6   Electronically Signed By:    Date and Time of Study: 2023-11-28 21:23:00          Meds given in ED:   Medications   sodium chloride 0.9 % flush 10 mL (has no administration in time range)   sodium chloride 0.9 % flush 10 mL (has no administration in time range)   sodium chloride 0.9 % flush 10 mL (has no administration in time range)   sodium chloride 0.9 % flush 10 mL (has no administration in time range)   sodium chloride 0.9 % infusion 40 mL (has no administration in time range)   nitroglycerin (NITROSTAT) SL tablet 0.4 mg (has no administration in time range)   acetaminophen (TYLENOL) tablet 650 mg (has no administration in time range)     Or   acetaminophen (TYLENOL) 160 MG/5ML oral solution 650 mg (has no administration in time range)     Or   acetaminophen (TYLENOL) suppository 650 mg (has no administration in time range)   ondansetron (ZOFRAN) tablet 4 mg (has no administration in time range)     Or   ondansetron (ZOFRAN) injection 4 mg (has no administration in time range)   calcium carbonate (TUMS) chewable tablet 500 mg (200 mg elemental) (has no administration in time range)   sodium chloride 0.9 % bolus 500 mL (has no administration in time range)   dextrose (GLUTOSE) oral gel 15 g (has no administration in time range)   dextrose (D50W) (25 g/50  mL) IV injection 25 g (has no administration in time range)   glucagon (GLUCAGEN) injection 1 mg (has no administration in time range)   insulin lispro (HUMALOG/ADMELOG) injection 2-7 Units (has no administration in time range)   ondansetron (ZOFRAN) injection 4 mg (has no administration in time range)   pantoprazole (PROTONIX) injection 80 mg (has no administration in time range)   sodium chloride 0.9 % bolus 500 mL (0 mL Intravenous Stopped 11/28/23 2210)   metoprolol tartrate (LOPRESSOR) injection 5 mg (5 mg Intravenous Given 11/28/23 2134)   ondansetron (ZOFRAN) injection 4 mg (4 mg Intravenous Given 11/28/23 2209)   metoprolol tartrate (LOPRESSOR) injection 5 mg (5 mg Intravenous Given 11/28/23 2209)   metoprolol tartrate (LOPRESSOR) injection 5 mg (5 mg Intravenous Given 11/29/23 0036)       Imaging results:  XR Chest 1 View    Result Date: 11/28/2023  No acute findings.  This report was finalized on 11/28/2023 9:51 PM by Dr. Alice Allen M.D on Workstation: [id number]       Ambulatory status:   ax2    Social issues:   Social History     Socioeconomic History    Marital status:      Spouse name: Lissette    Number of children: 3    Years of education: college   Tobacco Use    Smoking status: Never    Smokeless tobacco: Never    Tobacco comments:     CAFFEINE - OCCAS. SODA   Vaping Use    Vaping Use: Never used   Substance and Sexual Activity    Alcohol use: No     Comment: last drink 2 months ago    Drug use: No    Sexual activity: Defer       NIH Stroke Scale:       Registered Nurse, RN  11/29/23 00:52 EST

## 2023-11-29 NOTE — CONSULTS
Date of Consultation: 23    Referral Provider: Dr. Amaya.     Reason for Consultation: Atrial fibrillation with RVR.    Encounter Provider: Mauricio Corona MD    Group of Service: [facility]     Patient Name: Carlito Mo    :1955    Chief complaint: Vomiting, diarrhea.    History of Present Illness:      This is a very pleasant 67 year-old male who normally follows with Dr. Murguia in our practice.  He has a history of coronary artery disease status post CABG in .  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  He also has had paroxysmal atrial fibrillation and a chronic right bundle branch block.     His ejection fraction has varied over the years, and an EF of 30 to 35% was noted in May 2020.  His echocardiogram on 2022 showed an ejection fraction of 56%.  However, his most recent ejection fraction on 3/8/2023 showed an ejection fraction of 40 to 45% with distal inferoseptal, distal inferior wall, and inferior apical wall motion abnormalities.    The patient presented to the emergency department on 2023 after his spouse called EMS as he had been lethargic, vomiting, and having diarrhea.  While in the ER, he had an episode of hematemesis which evidently was coffee-ground in nature.  He was admitted to the [facility] for further care.    He did have rapid atrial fibrillation on admission, although it responded well to Cardizem drip.  When I saw him in the ICU earlier today, his rate was relatively controlled.  He was still fairly lethargic and was not very interactive.  He did tell me that he had not had any chest pain, but just felt poorly in general.  His high-sensitivity troponin was 176 initially, then 157, then 155.  His EKG showed atrial fibrillation with a right bundle branch block, which is also chronic for him.  On review of his  troponin, it seems to be chronically elevated, and was up to 127 five months ago.      ECHO 3/8/23     There is akinesis of the mid to distal inferoseptum, distal inferior wall, and inferior apex    Left ventricular ejection fraction appears to be 41 - 45%.    Left ventricular diastolic function is consistent with (grade III w/high LAP) fixed restrictive pattern.    The right ventricular cavity is mildly dilated. Normal right ventricular systolic function noted.    The left atrial cavity is moderately dilated.    Mild tricuspid valve regurgitation is present.    Calculated right ventricular systolic pressure from tricuspid regurgitation is 59 mmHg.    The inferior vena cava is dilated. IVC inspiratory collapse is absent.    There is no evidence of pericardial effusion.    Stress Test 4/24/19  Myocardial perfusion imaging indicates a medium-sized infarct located in the apex/periapical areas with no significant ischemia noted.  Left ventricular ejection fraction is mildly reduced (Calculated EF = 44%).         Past Medical History:   Diagnosis Date    Acquired hypothyroidism     Acute congestive heart failure     Acute respiratory failure with hypoxia     Acute sinusitis     SYLVAIN (acute kidney injury)     on CKD    Anemia     Arthritis     CAD (coronary artery disease)     Cancer     skin    Chronic combined systolic and diastolic congestive heart failure     Chronic ischemic heart disease     Chronic kidney disease (CKD)     CKD (chronic kidney disease)     COPD (chronic obstructive pulmonary disease)     Depression     Diabetes mellitus type 2, uncontrolled, without complications     Diabetic neuropathy     Disease of thyroid gland     Elevated cholesterol     Fatigue     Frequent PVCs     Generalized weakness     GERD (gastroesophageal reflux disease)     Heart attack     History of coronary artery disease     with remote history of bypass surgery in 2001    Hyperlipidemia     Hypertension     Hypertensive  encephalopathy     Mental status change     Acute    NO DEVICE/RECALLED     Pleural effusion     Pneumonia     Poor historian     RBBB (right bundle branch block)     Stroke     POOR VISION    TIA (transient ischemic attack)     Type 2 diabetes mellitus     Urinary retention     Vitamin D deficiency          Past Surgical History:   Procedure Laterality Date    APPENDECTOMY N/A 02/14/2016    Dr. Alexey Dodson    ARTERIOVENOUS FISTULA/SHUNT SURGERY Right 06/03/2022    Procedure: RIGHT FOREARM ARTERIOVENOUS FISTULA PLACEMENT RADIOCEPHALIC WITH CEPHALIC VEIN TRANSPOSITION;  Surgeon: Eliseo Willis MD;  Location: [facility] MAIN OR;  Service: Vascular;  Laterality: Right;    COLONOSCOPY      over 20 years ago.  no polyps     COLONOSCOPY N/A 09/18/2018    Procedure: COLONOSCOPY;  Surgeon: Jose Enrique Louie MD;  Location[facility] ENDOSCOPY;  Service: Gastroenterology    COLONOSCOPY N/A 09/19/2018    IH, EH, polyps (TAs w/low grade dysplasia)    COLONOSCOPY N/A 06/01/2020    Procedure: COLONOSCOPY;  Surgeon: Jose Enrique Louie MD;  Location: [facility] ENDOSCOPY;  Service: Gastroenterology;  Laterality: N/A;  Pre op-Anemia, Melena, History of Polyps  Post op-To Cecum/TI, Polyp, Poor Prep    CORONARY ARTERY BYPASS GRAFT  11/2001    ENDOSCOPY N/A 05/29/2020    Procedure: ESOPHAGOGASTRODUODENOSCOPY with biopsies;  Surgeon: Amanda Lovelace MD;  Location: [facility] ENDOSCOPY;  Service: Gastroenterology;  Laterality: N/A;  pre-anemia, dark stools  post-esophagitis, hiatal hernia    ENDOSCOPY N/A 09/15/2020    Procedure: ESOPHAGOGASTRODUODENOSCOPY WITH BIOPSIES;  Surgeon: Jose Enrique Louie MD;  Location: [facility] ENDOSCOPY;  Service: Gastroenterology;  Laterality: N/A;  pre: history of melena and esophagitis  post: mild esophagitis and gastritis, small hiatal hernia    HERNIA REPAIR Left 12/05/2019    THORACENTESIS      TONSILLECTOMY      VASECTOMY           Allergies   Allergen Reactions    Prozac [Fluoxetine Hcl] Other (See  Comments)     shaking         No current facility-administered medications on file prior to encounter.     Current Outpatient Medications on File Prior to Encounter   Medication Sig Dispense Refill    acetaminophen (TYLENOL) 325 MG tablet Take 2 tablets by mouth Every 4 (Four) Hours As Needed for Mild Pain.      amLODIPine (NORVASC) 5 MG tablet Take 1 tablet by mouth Daily.      apixaban (ELIQUIS) 5 MG tablet tablet Take 1 tablet by mouth Every 12 (Twelve) Hours. Indications: Atrial Fibrillation 60 tablet 5    aspirin 81 MG EC tablet Take 1 tablet by mouth Daily. 90 tablet 3    budesonide-formoterol (SYMBICORT) 160-4.5 MCG/ACT inhaler Inhale 2 puffs 2 (Two) Times a Day. 1 each 3    buPROPion XL (WELLBUTRIN XL) 150 MG 24 hr tablet Take 1 tablet by mouth Daily.      carvedilol (COREG) 6.25 MG tablet Take 1 tablet by mouth 2 (Two) Times a Day With Meals. 60 tablet 3    famotidine (PEPCID) 10 MG tablet Take 1 tablet by mouth 2 (Two) Times a Day As Needed for Heartburn.      ferrous sulfate 325 (65 FE) MG tablet Take 1 tablet by mouth Daily With Breakfast. 30 tablet 3    hydrALAZINE (APRESOLINE) 25 MG tablet Take 1 tablet by mouth 3 (Three) Times a Day.      Insulin Glargine w/ Trans Port (Basaglar Tempo Pen) 100 UNIT/ML solution pen-injector Inject 2 Units under the skin into the appropriate area as directed Every Night.      ipratropium-albuterol (COMBIVENT RESPIMAT)  MCG/ACT inhaler Inhale 1 puff 4 (Four) Times a Day As Needed for Wheezing.      levothyroxine (SYNTHROID, LEVOTHROID) 75 MCG tablet Take 1 tablet by mouth Daily. 30 tablet 5    ondansetron ODT (ZOFRAN-ODT) 4 MG disintegrating tablet Place 1 tablet on the tongue Every 8 (Eight) Hours As Needed for Nausea or Vomiting. 12 tablet 0    rosuvastatin (CRESTOR) 40 MG tablet Take 1 tablet by mouth Daily.      sodium bicarbonate 650 MG tablet Take 1 tablet by mouth 3 (Three) Times a Day. 90 tablet 3    tamsulosin (FLOMAX) 0.4 MG capsule 24 hr capsule Take 1  "capsule by mouth Daily.      torsemide (DEMADEX) 100 MG tablet TAKE ONE TABLET BY MOUTH DAILY 90 tablet 2         Social History     Socioeconomic History    Marital status:      Spouse name: Lissette    Number of children: 3    Years of education: college   Tobacco Use    Smoking status: Never    Smokeless tobacco: Never    Tobacco comments:     CAFFEINE - OCCAS. SODA   Vaping Use    Vaping Use: Never used   Substance and Sexual Activity    Alcohol use: No     Comment: last drink 2 months ago    Drug use: No    Sexual activity: Defer         Family History   Problem Relation Age of Onset    Diabetes Mother     Hypertension Mother     Macular degeneration Mother     Alcohol abuse Father     Cancer Father         lung,  age 65    Heart disease Father     Alcohol abuse Brother     Cirrhosis Brother          age 50    Liver cancer Brother 45    Diabetes Son     Kidney failure Son     Autism Son     Malig Hyperthermia Neg Hx        REVIEW OF SYSTEMS:   Pertinent positives are noted in the HPI above.  Otherwise, all other systems were reviewed, and are negative.     Objective:     Vitals:    23 1900 23 1934 23 2000 23 2100   BP: 108/63  108/66 128/84   BP Location: Right arm  Right arm Right arm   Patient Position: Lying  Lying Lying   Pulse: 69 82 76 87   Resp: 18 20 18 18   Temp:   98.2 °F (36.8 °C)    TempSrc:   Oral    SpO2: 96% 97% 94% 96%   Weight:       Height:         Body mass index is 21.81 kg/m².  Flowsheet Rows      Flowsheet Row First Filed Value   Admission Height 167.6 cm (66\") Documented at 2023 0615   Admission Weight 61.3 kg (135 lb 2.3 oz) Documented at 2023 0340             General:    Lethargic, but arouses and is appropriate.  Somewhat disheveled appearing.                   Head:    Normocephalic, atraumatic.   Eyes:          Conjunctivae and sclerae normal, no icterus.   Throat:   No oral lesions, no thrush, oral mucosa moist.    Neck:   " Supple, trachea midline.   Lungs:     Clear to auscultation bilaterally     Heart:    Irregularly irregular with a normal rate.  No murmurs, gallops, or rubs noted.   Abdomen:     Soft, non-tender, non-distended, positive bowel sounds.    Extremities:   No clubbing, cyanosis, or edema.     Pulses:   Pulses palpable and equal bilaterally.    Skin:   No bleeding or rash.   Neuro:   Non-focal.  Moves all extremities well.    Psychiatric:   Somnolent but appropriate.         Lab Review:                Results from last 7 days   Lab Units 11/29/23  0756   SODIUM mmol/L 138   POTASSIUM mmol/L 3.2*   CHLORIDE mmol/L 101   CO2 mmol/L 26.3   BUN mg/dL 36*   CREATININE mg/dL 3.24*   GLUCOSE mg/dL 133*   CALCIUM mg/dL 8.8     Results from last 7 days   Lab Units 11/29/23  0756 11/29/23  0026 11/28/23  2119   HSTROP T ng/L 155* 157* 176*     Results from last 7 days   Lab Units 11/29/23  1622 11/29/23  0756   WBC 10*3/mm3  --  5.51   HEMOGLOBIN g/dL 11.2* 10.0*  10.0*   HEMATOCRIT % 36.1* 31.5*  31.5*   PLATELETS 10*3/mm3  --  220     Results from last 7 days   Lab Units 11/28/23  2119 11/23/23  1446   INR  1.18* 1.21*   APTT seconds  --  34.8         Results from last 7 days   Lab Units 11/28/23  2119   MAGNESIUM mg/dL 2.0           EKG (reviewed by me personally):            Assessment:   1.  Recent vomiting and diarrhea, possibly viral gastroenteritis  2.  Coffee-ground emesis  3.  Paroxysmal atrial fibrillation with RVR  4.  Chronic combined CHF  5.  Cardiomyopathy, EF 40 to 45% on 3/8/2023  6.  Coronary artery disease, status post CABG in 2001  7.  History of embolic CVA, on chronic anticoagulation  8.  End-stage renal disease, on hemodialysis  9.  Altered mental status, likely multifactorial  10.  Anemia of chronic disease  11.  COPD without acute exacerbation (on home O2)  12.  Right bundle branch block, chronic  13.  Elevated troponin secondary to renal disease and type II NSTEMI secondary to #1 and #3  14.   Diabetes    Plan:       Per GI, the patient may have experienced a small Vanessa-Casarez tear from the vomiting.  The vomiting and diarrhea may be from viral gastroenteritis.  His hemoglobin is fairly stable, and this was not bright red blood, but rather coffee-ground emesis.  GI is planning on a scope when he is clinically stable.    The atrial fibrillation has been noted in the past as well.  For now, I would simply keep him on the Cardizem drip for rate control.  He initially was hypotensive, although this appears to be better.  I did note Dr. Amaya's suggestion for digoxin, although because of his end-stage renal disease, we would only be able to give him very limited quantities of this.  His carvedilol can be added back when it is safe from a blood pressure standpoint.  I suspect that with resolution of the GI issues, his atrial fibrillation will also improve.  His Eliquis is obviously being held currently pending a GI evaluation.    He has a baseline right bundle branch block, which is unchanged.  He had no new ischemic changes on his EKG.  His troponin is chronically elevated from his renal disease, and was as high as 115 just 5 months ago.  This is slightly higher, although most of this is still from the end-stage renal disease.  There is likely a small type II NSTEMI component secondary to the gastrointestinal symptoms, associated hypotension, and the atrial fibrillation with RVR.  I do not feel that this is ACS.  I do not feel that he requires an ischemic evaluation currently.  Additionally, with the GI issues, he would be a poor candidate for any type of PCI without being able to take dual antiplatelet therapy.    As long as his heart rate is stable, he is cleared at acceptable risk to proceed with an EGD from a cardiac standpoint.  He is not in congestive heart failure, and actually likely was volume depleted on admission.    Cardiology will continue to follow.  Thank you very much for this  consult.    Jonatan Corona MD

## 2023-11-29 NOTE — CONSULTS
[facility]  Initial Inpatient Consult Note    Referring Provider: Dr García    Reason for Consultation: Coffee ground emesis    Subjective     History of present illness:    68 y.o. male with multiple medical problems presented to ER last evening c/o nausea/vomiting and diarrhea. Sx present for last 3-4 days.  In ER noted to have some coffee ground like appearance to emesis.  No leann  blood.  Hb this am is 10.1 which appears close to baseline.   CT A/P from 11/24 reviewed and showed no acute abnormalities.  He has significant elevation of HS troponin.  Lactate was up a little but has now normalized.  Prior EGD in 2020 and 2021 reviewed with esophagitis.  Pt on eliquis in outpt setting. Also with COPD on 2L 02      Past Medical History:  Past Medical History:   Diagnosis Date    Acquired hypothyroidism     Acute congestive heart failure     Acute respiratory failure with hypoxia     Acute sinusitis     SYLVAIN (acute kidney injury)     on CKD    Anemia     Arthritis     CAD (coronary artery disease)     Cancer     skin    Chronic combined systolic and diastolic congestive heart failure     Chronic ischemic heart disease     Chronic kidney disease (CKD)     CKD (chronic kidney disease)     COPD (chronic obstructive pulmonary disease)     Depression     Diabetes mellitus type 2, uncontrolled, without complications     Diabetic neuropathy     Disease of thyroid gland     Elevated cholesterol     Fatigue     Frequent PVCs     Generalized weakness     GERD (gastroesophageal reflux disease)     Heart attack     History of coronary artery disease     with remote history of bypass surgery in 2001    Hyperlipidemia     Hypertension     Hypertensive encephalopathy     Mental status change     Acute    NO DEVICE/RECALLED     Pleural effusion     Pneumonia     Poor historian     RBBB (right bundle branch block)     Stroke     POOR VISION    TIA (transient ischemic attack)     Type 2 diabetes mellitus      Urinary retention     Vitamin D deficiency      Past Surgical History:  Past Surgical History:   Procedure Laterality Date    APPENDECTOMY N/A 02/14/2016    Dr. Alexey Dodson    ARTERIOVENOUS FISTULA/SHUNT SURGERY Right 06/03/2022    Procedure: RIGHT FOREARM ARTERIOVENOUS FISTULA PLACEMENT RADIOCEPHALIC WITH CEPHALIC VEIN TRANSPOSITION;  Surgeon: Eliseo Willis MD;  Location: [facility] MAIN OR;  Service: Vascular;  Laterality: Right;    COLONOSCOPY      over 20 years ago.  no polyps     COLONOSCOPY N/A 09/18/2018    Procedure: COLONOSCOPY;  Surgeon: Jose Enrique Louie MD;  Location:  [facility];  Service: Gastroenterology    COLONOSCOPY N/A 09/19/2018    IH, EH, polyps (TAs w/low grade dysplasia)    COLONOSCOPY N/A 06/01/2020    Procedure: COLONOSCOPY;  Surgeon: Jose Enrique Louie MD;  Location[facility] END[facility]COPY;  Service: Gastroenterology;  Laterality: N/A;  Pre op-Anemia, Melena, History of Polyps  Post op-To Cecum/TI, Polyp, Poor Prep    CORONARY ARTERY BYPASS GRAFT  11/2001    ENDOSCOPY N/A 05/29/2020    Procedure: ESOPHAGOGASTRODUODENOSCOPY with biopsies;  Surgeon: Amanda Lovelace MD;  Location: [facility] ENDOSCOPY;  Service: Gastroenterology;  Laterality: N/A;  pre-anemia, dark stools  post-esophagitis, hiatal hernia    ENDOSCOPY N/A 09/15/2020    Procedure: ESOPHAGOGASTRODUODENOSCOPY WITH BIOPSIES;  Surgeon: Jose Enrique Louie MD;  Location: [facility] ENDOSCOPY;  Service: Gastroenterology;  Laterality: N/A;  pre: history of melena and esophagitis  post: mild esophagitis and gastritis, small hiatal hernia    HERNIA REPAIR Left 12/05/2019    THORACENTESIS      TONSILLECTOMY      VASECTOMY        Social History:   Social History     Tobacco Use    Smoking status: Never    Smokeless tobacco: Never    Tobacco comments:     CAFFEINE - OCCAS. SODA   Substance Use Topics    Alcohol use: No     Comment: last drink 2 months ago      Family History:  Family History   Problem Relation Age of Onset    Diabetes  Mother     Hypertension Mother     Macular degeneration Mother     Alcohol abuse Father     Cancer Father         lung,  age 65    Heart disease Father     Alcohol abuse Brother     Cirrhosis Brother          age 50    Liver cancer Brother 45    Diabetes Son     Kidney failure Son     Autism Son     Malig Hyperthermia Neg Hx        Home Meds:  Medications Prior to Admission   Medication Sig Dispense Refill Last Dose    acetaminophen (TYLENOL) 325 MG tablet Take 2 tablets by mouth Every 4 (Four) Hours As Needed for Mild Pain.       amLODIPine (NORVASC) 5 MG tablet Take 1 tablet by mouth Daily.       apixaban (ELIQUIS) 5 MG tablet tablet Take 1 tablet by mouth Every 12 (Twelve) Hours. Indications: Atrial Fibrillation 60 tablet 5     aspirin 81 MG EC tablet Take 1 tablet by mouth Daily. 90 tablet 3     budesonide-formoterol (SYMBICORT) 160-4.5 MCG/ACT inhaler Inhale 2 puffs 2 (Two) Times a Day. 1 each 3     buPROPion XL (WELLBUTRIN XL) 150 MG 24 hr tablet Take 1 tablet by mouth Daily.       carvedilol (COREG) 6.25 MG tablet Take 1 tablet by mouth 2 (Two) Times a Day With Meals. 60 tablet 3     famotidine (PEPCID) 10 MG tablet Take 1 tablet by mouth 2 (Two) Times a Day As Needed for Heartburn.       ferrous sulfate 325 (65 FE) MG tablet Take 1 tablet by mouth Daily With Breakfast. 30 tablet 3     hydrALAZINE (APRESOLINE) 25 MG tablet Take 1 tablet by mouth 3 (Three) Times a Day.       Insulin Glargine w/ Trans Port (Basaglar Tempo Pen) 100 UNIT/ML solution pen-injector Inject 2 Units under the skin into the appropriate area as directed Every Night.       ipratropium-albuterol (COMBIVENT RESPIMAT)  MCG/ACT inhaler Inhale 1 puff 4 (Four) Times a Day As Needed for Wheezing.       levothyroxine (SYNTHROID, LEVOTHROID) 75 MCG tablet Take 1 tablet by mouth Daily. 30 tablet 5     ondansetron ODT (ZOFRAN-ODT) 4 MG disintegrating tablet Place 1 tablet on the tongue Every 8 (Eight) Hours As Needed for Nausea  or Vomiting. 12 tablet 0     rosuvastatin (CRESTOR) 40 MG tablet Take 1 tablet by mouth Daily.       sodium bicarbonate 650 MG tablet Take 1 tablet by mouth 3 (Three) Times a Day. 90 tablet 3     tamsulosin (FLOMAX) 0.4 MG capsule 24 hr capsule Take 1 capsule by mouth Daily.       torsemide (DEMADEX) 100 MG tablet TAKE ONE TABLET BY MOUTH DAILY 90 tablet 2      Current Meds:   budesonide-formoterol, 2 puff, Inhalation, BID - RT  insulin regular, 2-7 Units, Subcutaneous, Q6H  levothyroxine, 75 mcg, Oral, Q AM  pantoprazole, 80 mg, Intravenous, Once  sodium bicarbonate, 650 mg, Oral, TID  sodium chloride, 10 mL, Intravenous, Q12H  sodium chloride, 10 mL, Intravenous, Q12H      Allergies:  Allergies   Allergen Reactions    Prozac [Fluoxetine Hcl] Other (See Comments)     shaking       Objective     Vital Signs  Temp:  [98 °F (36.7 °C)-98.9 °F (37.2 °C)] 98 °F (36.7 °C)  Heart Rate:  [] 75  Resp:  [30] 30  BP: ()/() 102/60  Physical Exam:  General Appearance:     Alert, cooperative, in no acute distress   Abdomen:     Normal bowel sounds, no masses, no organomegaly, soft     nontender, nondistended, no guarding, no rebound                 tenderness   Rectal:     Deferred       Results Review:   I reviewed the patient's new clinical results.  I reviewed the patient's new imaging results and agree with the interpretation.    Results from last 7 days   Lab Units 11/29/23 0338 11/28/23 2119 11/24/23 1836   WBC 10*3/mm3 5.39 6.63 10.01   HEMOGLOBIN g/dL 10.1* 11.3* 10.7*   HEMATOCRIT % 32.0* 37.4* 34.6*   PLATELETS 10*3/mm3 176 240 152     Results from last 7 days   Lab Units 11/28/23 2119 11/24/23 1836 11/23/23  1446   SODIUM mmol/L 140 133* 138   POTASSIUM mmol/L 4.0 3.7 3.9   CHLORIDE mmol/L 97* 98 99   CO2 mmol/L 26.3 22.9 25.0   BUN mg/dL 28* 44* 39*   CREATININE mg/dL 2.74* 3.70* 3.84*   CALCIUM mg/dL 9.5 9.1 9.7   BILIRUBIN mg/dL 0.4 0.4 0.6   ALK PHOS U/L 81 74 83   ALT (SGPT) U/L 31 17 15    AST (SGOT) U/L 27 22 17   GLUCOSE mg/dL 243* 323* 178*     Results from last 7 days   Lab Units 11/28/23  2119 11/23/23  1446   INR  1.18* 1.21*     Lab Results   Lab Value Date/Time    LIPASE 27 11/28/2023 2119    LIPASE 31 11/24/2023 1836    LIPASE 33 12/13/2021 1614    LIPASE 58 11/30/2017 2155    LIPASE 30 08/27/2016 1627    LIPASE 30 02/14/2016 0635       Radiology:  XR Chest 1 View   Final Result   No acute findings.       This report was finalized on 11/28/2023 9:51 PM by Dr. Alice Allen M.D on Workstation: [id number]              Assessment & Plan   Assessment:    Nausea/vomiting   Diarrhea   Coffee ground emesis   CAD on DOAC   COPD on home 02   Elevated troponin    Plan:   [age]-year-old gentleman with multiple medical problems presenting with what sounds like possible viral gastroenteritis with associated nausea vomiting and diarrhea.  He had some questionable coffee-ground emesis in the emergency room but does not appear to be having a large-volume GI bleed at this time.  Most likely has an element of esophagitis possibly a small Vanessa-Casarez tear.  -hold anticoagulation  -PPI IV PID  -serial H/H  -he has significantly elevated HS Troponin, along with Afib/RVR.  Cardiology to see  -would plan for eventual EGD when stabalized, please inform GI team of any acute change in patients status    I discussed the patients findings and my recommendations with patient.         Grupo Lauren M.D.  [facility]  [address] [place] [address]  Office: [phone]

## 2023-11-29 NOTE — CONSULTS
[facility] Consult Note      Patient Name: Carlito Mo  : 1955  MRN: [id number]  Primary Care Physician:  Florencia Caballero MD  Referring Physician: No ref. provider found  Date of admission: 2023    Subjective     Reason for Consult: End-stage renal disease    HPI:   Carlito Mo is a 68 y.o. male patient was admitted on 2023 with persistent nausea and vomiting he is unable to give me any meaningful history he has a history of end-stage renal disease on maintenance hemodialysis every Tuesday, Thursday and Saturday at [facility] under the care of my partner Dr. Blake he had dialysis yesterday.  He was found to have atrial fibrillation with rapid ventricular response which has improved since admission with the diltiazem drip.  Also he had evidence of hematemesis and bloody vomitus according to the history in the chart.  He is currently on PPI continuous infusion.  He has diabetes mellitus type 2 with renal complication, hypertension, prior history of CVA, history of BPH, anemia of chronic kidney disease, coronary artery disease with prior MI, hypothyroidism, diabetic neuropathy, history of depression    Unable to obtain any information from the patient he is very      Review of Systems:   Not obtainable    Personal History     Past Medical History:   Diagnosis Date   • Acquired hypothyroidism    • Acute congestive heart failure    • Acute respiratory failure with hypoxia    • Acute sinusitis    • SYLVAIN (acute kidney injury)     on CKD   • Anemia    • Arthritis    • CAD (coronary artery disease)    • Cancer     skin   • Chronic combined systolic and diastolic congestive heart failure    • Chronic ischemic heart disease    • Chronic kidney disease (CKD)    • CKD (chronic kidney disease)    • COPD (chronic obstructive pulmonary disease)    • Depression    • Diabetes mellitus type 2, uncontrolled, without complications    • Diabetic neuropathy    • Disease of  thyroid gland    • Elevated cholesterol    • Fatigue    • Frequent PVCs    • Generalized weakness    • GERD (gastroesophageal reflux disease)    • Heart attack    • History of coronary artery disease     with remote history of bypass surgery in 2001   • Hyperlipidemia    • Hypertension    • Hypertensive encephalopathy    • Mental status change     Acute   • NO DEVICE/RECALLED    • Pleural effusion    • Pneumonia    • Poor historian    • RBBB (right bundle branch block)    • Stroke     POOR VISION   • TIA (transient ischemic attack)    • Type 2 diabetes mellitus    • Urinary retention    • Vitamin D deficiency        Past Surgical History:   Procedure Laterality Date   • APPENDECTOMY N/A 02/14/2016    Dr. Alexey Dodson   • ARTERIOVENOUS FISTULA/SHUNT SURGERY Right 06/03/2022    Procedure: RIGHT FOREARM ARTERIOVENOUS FISTULA PLACEMENT RADIOCEPHALIC WITH CEPHALIC VEIN TRANSPOSITION;  Surgeon: Eliseo Willis MD;  Location: [facility] MAIN OR;  Service: Vascular;  Laterality: Right;   • COLONOSCOPY      over 20 years ago.  no polyps    • COLONOSCOPY N/A 09/18/2018    Procedure: COLONOSCOPY;  Surgeon: Jose Enrique Louie MD;  Location: [facility] ENDOSCOPY;  Service: Gastroenterology   • COLONOSCOPY N/A 09/19/2018    IH, EH, polyps (TAs w/low grade dysplasia)   • COLONOSCOPY N/A 06/01/2020    Procedure: COLONOSCOPY;  Surgeon: Jose Enrique Louie MD;  Location: [facility] ENDOSCOPY;  Service: Gastroenterology;  Laterality: N/A;  Pre op-Anemia, Melena, History of Polyps  Post op-To Cecum/TI, Polyp, Poor Prep   • CORONARY ARTERY BYPASS GRAFT  11/2001   • ENDOSCOPY N/A 05/29/2020    Procedure: ESOPHAGOGASTRODUODENOSCOPY with biopsies;  Surgeon: Amanda Lovelace MD;  Location: [facility] ENDOSCOPY;  Service: Gastroenterology;  Laterality: N/A;  pre-anemia, dark stools  post-esophagitis, hiatal hernia   • ENDOSCOPY N/A 09/15/2020    Procedure: ESOPHAGOGASTRODUODENOSCOPY WITH BIOPSIES;  Surgeon: Jose Enrique Louie MD;  Location:   [facility];  Service: Gastroenterology;  Laterality: N/A;  pre: history of melena and esophagitis  post: mild esophagitis and gastritis, small hiatal hernia   • HERNIA REPAIR Left 12/05/2019   • THORACENTESIS     • TONSILLECTOMY     • VASECTOMY         Family History: family history includes Alcohol abuse in his brother and father; Autism in his son; Cancer in his father; Cirrhosis in his brother; Diabetes in his mother and son; Heart disease in his father; Hypertension in his mother; Kidney failure in his son; Liver cancer (age of onset: 45) in his brother; Macular degeneration in his mother.    Social History:  reports that he has never smoked. He has never used smokeless tobacco. He reports that he does not drink alcohol and does not use drugs.    Home Medications:  Prior to Admission medications    Medication Sig Start Date End Date Taking? Authorizing Provider   acetaminophen (TYLENOL) 325 MG tablet Take 2 tablets by mouth Every 4 (Four) Hours As Needed for Mild Pain. 9/19/22   Asim Hogue MD   amLODIPine (NORVASC) 5 MG tablet Take 1 tablet by mouth Daily.    ProviderAlonzo MD   apixaban (ELIQUIS) 5 MG tablet tablet Take 1 tablet by mouth Every 12 (Twelve) Hours. Indications: Atrial Fibrillation 6/22/23   Kae Bunch APRN   aspirin 81 MG EC tablet Take 1 tablet by mouth Daily. 3/28/23   Nida Lopes APRN   budesonide-formoterol (SYMBICORT) 160-4.5 MCG/ACT inhaler Inhale 2 puffs 2 (Two) Times a Day. 5/5/23   Smith Henson MD   buPROPion XL (WELLBUTRIN XL) 150 MG 24 hr tablet Take 1 tablet by mouth Daily.    ProviderAlonzo MD   carvedilol (COREG) 6.25 MG tablet Take 1 tablet by mouth 2 (Two) Times a Day With Meals. 5/30/23   Nida Lopes APRN   famotidine (PEPCID) 10 MG tablet Take 1 tablet by mouth 2 (Two) Times a Day As Needed for Heartburn.    ProviderAlonzo MD   ferrous sulfate 325 (65 FE) MG tablet Take 1 tablet by mouth Daily With Breakfast. 5/5/23   Sixto  Smith ADAMS MD   hydrALAZINE (APRESOLINE) 25 MG tablet Take 1 tablet by mouth 3 (Three) Times a Day.    Alonzo Dean MD   Insulin Glargine w/ Trans Port (Basaglar Tempo Pen) 100 UNIT/ML solution pen-injector Inject 2 Units under the skin into the appropriate area as directed Every Night.    Alonzo Dean MD   ipratropium-albuterol (COMBIVENT RESPIMAT)  MCG/ACT inhaler Inhale 1 puff 4 (Four) Times a Day As Needed for Wheezing.    Alonzo Dean MD   levothyroxine (SYNTHROID, LEVOTHROID) 75 MCG tablet Take 1 tablet by mouth Daily. 2/8/22   Erica Welsh MD   ondansetron ODT (ZOFRAN-ODT) 4 MG disintegrating tablet Place 1 tablet on the tongue Every 8 (Eight) Hours As Needed for Nausea or Vomiting. 11/24/23   Klever Jane PA   rosuvastatin (CRESTOR) 40 MG tablet Take 1 tablet by mouth Daily.    Alonzo Dean MD   sodium bicarbonate 650 MG tablet Take 1 tablet by mouth 3 (Three) Times a Day. 5/5/23   Smith Henson MD   tamsulosin (FLOMAX) 0.4 MG capsule 24 hr capsule Take 1 capsule by mouth Daily.    Alonzo Dean MD   torsemide (DEMADEX) 100 MG tablet TAKE ONE TABLET BY MOUTH DAILY 11/20/23   Nida Lopes APRN       Allergies:  Allergies   Allergen Reactions   • Prozac [Fluoxetine Hcl] Other (See Comments)     shaking       Objective     Vitals:   Temp:  [97.8 °F (36.6 °C)-98.9 °F (37.2 °C)] 97.8 °F (36.6 °C)  Heart Rate:  [] 71  Resp:  [20-30] 20  BP: ()/() 112/61  Flow (L/min):  [2] 2    Intake/Output Summary (Last 24 hours) at 11/29/2023 0977  Last data filed at 11/29/2023 0545  Gross per 24 hour   Intake 2070 ml   Output --   Net 2070 ml       Physical Exam:   Constitutional: Awake, but very confused, chronically no acute distress.  HEENT: Sclera anicteric, no conjunctival injection  Neck: Supple, no thyromegaly, no lymphadenopathy, trachea at midline, no JVD  Respiratory: Clear to auscultation bilaterally, nonlabored  respiration  Cardiovascular: Irregularly irregular, no murmurs, no rubs or gallops, no carotid bruit  Gastrointestinal: Positive bowel sounds, abdomen is soft, nontender and nondistended  : No palpable bladder  Musculoskeletal: No edema, no clubbing or cyanosis, functional AV fistula in right forearm with good thrill and  Psychiatric: Confused, unable to  Neurologic: Difficult to assess with his altered mental status  Skin: Warm and dry       Scheduled Meds:     budesonide-formoterol, 2 puff, Inhalation, BID - RT  insulin regular, 2-7 Units, Subcutaneous, Q6H  levothyroxine, 75 mcg, Oral, Q AM  pantoprazole, 80 mg, Intravenous, Once  sodium bicarbonate, 650 mg, Oral, TID  sodium chloride, 10 mL, Intravenous, Q12H  sodium chloride, 10 mL, Intravenous, Q12H      IV Meds:   dilTIAZem, 5-15 mg/hr, Last Rate: 5 mg/hr (11/29/23 0432)  pantoprazole, 40 mg, Last Rate: 40 mg (11/29/23 0745)        Results Reviewed:   I have personally reviewed the results from the time of this admission to 11/29/2023 09:35 EST     Lab Results   Component Value Date    GLUCOSE 133 (H) 11/29/2023    CALCIUM 8.8 11/29/2023     11/29/2023    K 3.2 (L) 11/29/2023    CO2 26.3 11/29/2023     11/29/2023    BUN 36 (H) 11/29/2023    CREATININE 3.24 (H) 11/29/2023    EGFRIFAFRI 23 (L) 09/27/2022    EGFRIFNONA 19 (L) 02/04/2022    BCR 11.1 11/29/2023    ANIONGAP 10.7 11/29/2023      Lab Results   Component Value Date    MG 2.0 11/28/2023    PHOS 3.2 11/24/2023    ALBUMIN 3.2 (L) 11/29/2023           Assessment / Plan       Atrial fibrillation with rapid ventricular response      ASSESSMENT:  End-stage renal disease and diabetic nephropathy on maintenance hemodialysis every Tuesday, Thursday and Saturday,He appears to be euvolemic,   Hypokalemia associated with metabolic and respiratory alkalosis with alkaline  Atrial fibrillation with rapid ventricular response better with diltiazem drip  Diabetes mellitus type 2 with renal  complication  Hypertension with chronic kidney disease, well controlled  Anemia of chronic kidney disease, hemoglobin 10 treated with long-acting HAI as an outpatient  Altered mental status.  History of coronary artery disease and prior CVA.    PLAN:  Will replete potassium by giving 10 mEq of KCl per hour x2 IV.  Hemodialysis tomorrow  Continue the same treatment.  Surveillance labs    I reviewed the chart and other providers notes, reviewed labs and imaging    Thank you for involving us in the care of Carlito Mo.  Please feel free to call with any questions.    Cameron Olguin MD  11/29/23  09:35 [facility]    [facility]  [phone]      Please note that portions of this note were completed with a voice recognition program.

## 2023-11-29 NOTE — PROGRESS NOTES
Dr. MEGHAN Amaya    [facility] INTENSIVE CARE        Patient ID:  Name:  Carlito Mo  MRN:  [id number]  1955  68 y.o.  male            CC/Reason for visit: Nausea vomiting of unclear etiology    Interval hx: Patient continues to have some nausea.  He was admitted to the hospital last night for nausea vomiting.  Apparently had some coffee-ground emesis in the ER but none has been witnessed since by staff.  I reviewed H&P by Dr. Erich García.  I reviewed recent notes.  Patient has been to the emergency room twice over the past 10 days for nausea vomiting of unclear etiology.  He has been hypotensive with atrial fibrillation rapid ventricular response.  Currently with holding all anticoagulants.  Consulting cardiology for atrial fibrillation as well as GI for nausea vomiting and nephrology for abnormal kidney function    ROS: Denies chest pain or abdominal pain but still having some nausea    Vitals:  Vitals:    11/29/23 0900 11/29/23 0930 11/29/23 1000 11/29/23 1220   BP: 134/83 130/72 122/88    Pulse: 77 90 87    Resp:       Temp:    98.1 °F (36.7 °C)   TempSrc:    Oral   SpO2: 96% 96% 95%    Weight:       Height:               Body mass index is 21.81 kg/m².    Intake/Output Summary (Last 24 hours) at 11/29/2023 1429  Last data filed at 11/29/2023 0545  Gross per 24 hour   Intake 2070 ml   Output --   Net 2070 ml       Exam:  GEN:  No distress  Alert, oriented x 3.   LUNGS: Distant and diminished but overall clear breath sounds bilat, no use of accessory muscles  CV:  Irregularly irregular tachycardic, distant heart tones, no murmur, no edema  ABD:  Non tender, no enlarged liver or masses      Scheduled meds:  budesonide-formoterol, 2 puff, Inhalation, BID - RT  insulin regular, 2-7 Units, Subcutaneous, Q6H  levothyroxine, 75 mcg, Oral, Q AM  pantoprazole, 80 mg, Intravenous, Once  sodium bicarbonate, 650 mg, Oral, TID  sodium chloride, 10 mL, Intravenous, Q12H  sodium chloride, 10 mL, Intravenous,  Q12H      IV meds:                      dilTIAZem, 5-15 mg/hr, Last Rate: 5 mg/hr (11/29/23 0432)  pantoprazole, 40 mg, Last Rate: 40 mg (11/29/23 1242)        Data Review:   I reviewed the patient's medications and new clinical results.    COVID19   Date Value Ref Range Status   05/02/2023 Not Detected Not Detected - Ref. Range Final         Lab Results   Component Value Date    CALCIUM 8.8 11/29/2023    PHOS 3.2 11/24/2023    MG 2.0 11/28/2023    MG 2.1 12/01/2022    MG 2.1 06/05/2022     Results from last 7 days   Lab Units 11/29/23  0756 11/29/23  0338 11/28/23 2119 11/24/23  1836 11/23/23  1446   SODIUM mmol/L 138  --  140 133* 138   POTASSIUM mmol/L 3.2*  --  4.0 3.7 3.9   CHLORIDE mmol/L 101  --  97* 98 99   CO2 mmol/L 26.3  --  26.3 22.9 25.0   BUN mg/dL 36*  --  28* 44* 39*   CREATININE mg/dL 3.24*  --  2.74* 3.70* 3.84*   CALCIUM mg/dL 8.8  --  9.5 9.1 9.7   BILIRUBIN mg/dL 0.3  --  0.4 0.4 0.6   ALK PHOS U/L 66  --  81 74 83   ALT (SGPT) U/L 23  --  31 17 15   AST (SGOT) U/L 19  --  27 22 17   GLUCOSE mg/dL 133*  --  243* 323* 178*   WBC 10*3/mm3 5.51 5.39 6.63 10.01 9.58   HEMOGLOBIN g/dL 10.0*  10.0* 10.1* 11.3* 10.7* 11.5*   PLATELETS 10*3/mm3 220 176 240 152 159   INR   --   --  1.18*  --  1.21*   PROBNP pg/mL  --   --  59,481.0*  --   --              Results from last 7 days   Lab Units 11/29/23  0756 11/29/23  0026 11/28/23 2119   HSTROP T ng/L 155* 157* 176*     Results from last 7 days   Lab Units 11/28/23  2223   PH, ARTERIAL pH units 7.555*   PCO2, ARTERIAL mm Hg 29.6*   PO2 ART mm Hg 83.5   O2 SATURATION ART % 97.7   MODALITY  Room Air             ASSESSMENT:     Atrial fibrillation with rapid ventricular response  Nausea vomiting of unclear etiology  Hypotension, likely cardiogenic due to A-fib  Probable upper GI bleed  End-stage renal disease  Diabetes mellitus type 2  COPD  Hypothyroidism  Failure to thrive        PLAN:  Patient and all problems new to me.  Is critically ill.  Currently  has rapid trickle response requiring Cardizem drip but patient becoming hypotensive.  Consult cardiology and see if perhaps they want to change to digoxin for rate control without exacerbating hypotension.  Chest x-ray images reviewed, appear clear.  Unclear why the patient has had nausea vomiting.  Consult GI for their input.  He presented to the ER twice over the past few weeks for nausea vomiting of unclear etiology.  Nephrology to follow along for his end-stage renal disease.  This patient has several chronic medical conditions, and now several acute medical illnesses.  Extensive amount of data was reviewed.  Images were directly visualized by me.  This patient warrants high complexity medical decision-making.        Bernardo Amaya MD  11/29/2023

## 2023-11-29 NOTE — ED PROVIDER NOTES
" EMERGENCY DEPARTMENT ENCOUNTER    Room Number:  I378/1  Date of encounter:  11/29/2023  PCP: Florencia Caballero MD  Historian: EMS, patient  Relevant information and history provided by sources other than the patient will be included below and in the ED Course.  Review of pertinent past medical records may also be included in record below and ED Course.    HPI:  Chief Complaint: Per the patient \"I was in the bathroom too long \"so my wife called EMS.  Vomiting and diarrhea.  A complete HPI/ROS/PMH/PSH/SH/FH are unobtainable due to: This gentleman is a poor historian.  No family currently is here.  Patient believes his wife is coming.  Context: Carlito Mo is a 68 y.o. male who presents to the ED c/o according to the patient his spouse called EMS because he was in the bathroom too long.  He was having vomiting and diarrhea.  This patient was just seen in the emergency department 1123, 1124 with nausea vomiting and diarrhea.  Please see medical history reviewed below.  He states that he has been having vomiting and dry area.  He states a lot but cannot really provide specifics.  When I asked him about his recent visit in the ER he states he was doing better.  Denies any blood in the vomit or diarrhea.  When I ask him questions frequently he does not answer.  He does report shortness of breath.  He states this been going on for a couple hours.  He states that he did have dialysis today.  He gets his dialysis and his right arm.  States he is got a mild chest discomfort.  He denies fevers or chills.  When I ask him if he takes his medicine he states yes I think I do.  I ask him specifically if he takes blood thinning medicine and he states yes.  He also states when I ask him that he feels like his heart is beating fast.  Denies any new focal weakness to arms or legs.  When I ask him if he is on oxygen he states that he sometimes is on 2 L.  When I asked him if he supposed to be on all the time he states I take it in the " morning.      Previous Episodes: This gentleman was just recently here with nausea vomiting and diarrhea.  Reviewed old records.  This patient has significant multiple serious comorbidities.  Current Symptoms: See above    MEDICAL HISTORY REVIEWED  I can see this patient was seen in the ER 11/23/2023 as well as 11/24/2023.  Presented with onset of vomiting and diarrhea he was at a restaurant when this started.  EMS stated that he copious amounts of diarrhea and multiple his episodes of vomiting he is currently on hemodialysis he did miss his dialysis appointment 1 day prior to this visit on 11/23/2023.  He has a history again of chronic kidney disease on dialysis.  History of COPD, depression, type 2 diabetes, diabetic neuropathy GERD, history of coronary artery disease and history of heart attack in the past history of hyperlipidemia hypertension as well as hypertensive encephalopathy in the past.  Mention that he is a poor historian.  I reviewed his lab work on the 24th he did have an elevated glucose of 323.  His creatinine was 3.7 his potassium was normal BUN was 44 lipase was normal labs count was normal hemoglobin was 10.7 he had a CT scan of the abdomen pelvis there was no acute abnormality seen on the CT scan of the abdomen pelvis and he was discharged home.      I also want to add this gentleman does have a history of paroxysmal atrial fibrillation.  He is on Eliquis 5 mg.  I reviewed his other medicine as well.      I reviewed the echo that he had in March 2023 his ejection fraction was 41 to 45% there was akinesis of the mid to distal inferior septum, distal inferior wall and inferior apex grade 2 diastolic dysfunction mild tricuspid valve regurg.  PAST MEDICAL HISTORY  Active Ambulatory Problems     Diagnosis Date Noted    Major depressive disorder with single episode, in partial remission     Other hyperlipidemia     Type 2 diabetes mellitus, with long-term current use of insulin     Vitamin D  deficiency 12/17/2015    Erectile dysfunction 12/17/2015    Chronic fatigue 04/25/2016    Type 2 diabetes mellitus with ophthalmic complication 04/25/2016    Skin lesion of chest wall 06/20/2016    Slow transit constipation 09/01/2016    Benign prostatic hyperplasia with nocturia 12/20/2016    History of basal cell carcinoma 12/20/2016    High blood pressure associated with diabetes 12/20/2016    Acquired hypothyroidism 12/20/2017    Hypertensive urgency 02/16/2018    Recurrent strokes 02/20/2018    Noncompliance w/medication treatment due to intermit use of medication 05/04/2018    Urinary retention 09/20/2018    Pneumonia 09/21/2018    Acute on chronic combined systolic and diastolic congestive heart failure 09/21/2018    Acute respiratory failure with hypoxia 09/21/2018    Suspected sleep apnea 10/29/2018    Pleural effusion 12/01/2018    YAW (obstructive sleep apnea) 12/13/2018    Coronary artery disease involving native coronary artery of native heart without angina pectoris 04/18/2019    Chronically elevated troponin 07/02/2019    Colon cancer screening 10/30/2018    Enlarged lymph nodes 10/30/2018    Functional gait abnormality 10/30/2018    History of myocardial infarction 02/06/2019    Hospital discharge follow-up 05/31/2019    Insomnia 02/06/2019    Iron deficiency anemia 10/02/2018    Major depression, recurrent 10/16/2012    Anemia, chronic renal failure, stage 3 (moderate) 02/27/2020    Essential hypertension 03/10/2020    Adverse effect of iron and its compounds, initial encounter 05/23/2020    Acute on chronic renal insufficiency 05/25/2020    Debility 05/25/2020    Falls frequently 05/25/2020    Acute pain of right shoulder 05/27/2020    Acute on chronic anemia 05/25/2020    Heme positive stool 05/25/2020    Neurogenic orthostatic hypotension 05/30/2020    Anemia, chronic disease 05/25/2020    History of colon polyps 05/25/2020    Helicobacter pylori gastritis 06/04/2020    Esophagitis 06/10/2020     Sleep related hypoxia 07/23/2020    Embolic stroke 05/20/2021    Patent foramen ovale 05/22/2021    Pleural effusion, bilateral 05/22/2021    Cardiomyopathy 05/24/2021    Lower abdominal pain 12/13/2021    Diarrhea 12/14/2021    CKD (chronic kidney disease) stage 4, GFR 15-29 ml/min 12/16/2021    Hypertension not at goal 02/02/2022    Memory loss due to medical condition 02/08/2022    Generalized weakness 03/01/2022    ERRONEOUS ENCOUNTER--DISREGARD 03/09/2022    Weakness 06/03/2022    Paroxysmal atrial fibrillation 07/25/2022    Chronic anticoagulation 07/25/2022    Multiple falls 09/15/2022    Dehydration 09/16/2022    SYLVAIN (acute kidney injury) 09/16/2022    Acute ischemic stroke 09/17/2022    Acute combined systolic and diastolic congestive heart failure 05/02/2023    History of stroke 05/02/2023    Iron deficiency anemia 05/04/2023    Acute HFrEF (heart failure with reduced ejection fraction) 05/05/2023     Resolved Ambulatory Problems     Diagnosis Date Noted    Anemia     Arthritis     Diabetes mellitus out of control     Acute sinusitis     Accumulation of fluid in tissues 12/17/2015    Fatigue 12/17/2015    Cardiomyopathy, ischemic 12/17/2015    Acute appendicitis 03/02/2016    Atherosclerosis of coronary artery 10/16/2012    Altered mental status 11/30/2017    Hypertensive encephalopathy syndrome 04/30/2018    Hyperglycemia 05/04/2018    Screening for colorectal cancer 08/22/2018    Constipation 08/22/2018    Snoring 12/13/2018    Lower abdominal pain 05/27/2020    Hypertensive emergency 02/01/2022    DE LEON (dyspnea on exertion) 03/07/2023    Shortness of breath 03/08/2023     Past Medical History:   Diagnosis Date    Acute congestive heart failure     CAD (coronary artery disease)     Cancer     Chronic combined systolic and diastolic congestive heart failure     Chronic ischemic heart disease     Chronic kidney disease (CKD)     CKD (chronic kidney disease)     COPD (chronic obstructive pulmonary disease)      Depression     Diabetes mellitus type 2, uncontrolled, without complications     Diabetic neuropathy     Disease of thyroid gland     Elevated cholesterol     Frequent PVCs     GERD (gastroesophageal reflux disease)     Heart attack     History of coronary artery disease     Hyperlipidemia     Hypertension     Hypertensive encephalopathy     Mental status change     NO DEVICE/RECALLED     Poor historian     RBBB (right bundle branch block)     Stroke     TIA (transient ischemic attack)     Type 2 diabetes mellitus          PAST SURGICAL HISTORY  Past Surgical History:   Procedure Laterality Date    APPENDECTOMY N/A 02/14/2016    Dr. Alexey Dodson    ARTERIOVENOUS FISTULA/SHUNT SURGERY Right 06/03/2022    Procedure: RIGHT FOREARM ARTERIOVENOUS FISTULA PLACEMENT RADIOCEPHALIC WITH CEPHALIC VEIN TRANSPOSITION;  Surgeon: Eliseo Willis MD;  Location: [facility] MAIN OR;  Service: Vascular;  Laterality: Right;    COLONOSCOPY      over 20 years ago.  no polyps     COLONOSCOPY N/A 09/18/2018    Procedure: COLONOSCOPY;  Surgeon: Jose Enrique Louie MD;  Location: [facility] ENDOSCOPY;  Service: Gastroenterology    COLONOSCOPY N/A 09/19/2018    IH, EH, polyps (TAs w/low grade dysplasia)    COLONOSCOPY N/A 06/01/2020    Procedure: COLONOSCOPY;  Surgeon: Jose Enrique Louie MD;  Location: [facility] ENDOSCOPY;  Service: Gastroenterology;  Laterality: N/A;  Pre op-Anemia, Melena, History of Polyps  Post op-To Cecum/TI, Polyp, Poor Prep    CORONARY ARTERY BYPASS GRAFT  11/2001    ENDOSCOPY N/A 05/29/2020    Procedure: ESOPHAGOGASTRODUODENOSCOPY with biopsies;  Surgeon: Amanda Lovelace MD;  Location: [facility] ENDOSCOPY;  Service: Gastroenterology;  Laterality: N/A;  pre-anemia, dark stools  post-esophagitis, hiatal hernia    ENDOSCOPY N/A 09/15/2020    Procedure: ESOPHAGOGASTRODUODENOSCOPY WITH BIOPSIES;  Surgeon: Jose Enrique Louie MD;  Location: [facility] ENDOSCOPY;  Service: Gastroenterology;  Laterality: N/A;  pre: history of  melena and esophagitis  post: mild esophagitis and gastritis, small hiatal hernia    HERNIA REPAIR Left 2019    THORACENTESIS      TONSILLECTOMY      VASECTOMY           FAMILY HISTORY  Family History   Problem Relation Age of Onset    Diabetes Mother     Hypertension Mother     Macular degeneration Mother     Alcohol abuse Father     Cancer Father         lung,  age 65    Heart disease Father     Alcohol abuse Brother     Cirrhosis Brother          age 50    Liver cancer Brother 45    Diabetes Son     Kidney failure Son     Autism Son     Malig Hyperthermia Neg Hx          SOCIAL HISTORY  Social History     Socioeconomic History    Marital status:      Spouse name: Lissette    Number of children: 3    Years of education: college   Tobacco Use    Smoking status: Never    Smokeless tobacco: Never    Tobacco comments:     CAFFEINE - OCCAS. SODA   Vaping Use    Vaping Use: Never used   Substance and Sexual Activity    Alcohol use: No     Comment: last drink 2 months ago    Drug use: No    Sexual activity: Defer         ALLERGIES  Prozac [fluoxetine hcl]        REVIEW OF SYSTEMS  Review of Systems     All systems reviewed and negative except for those discussed in HPI.       PHYSICAL EXAM    I have reviewed the triage vital signs and nursing notes.    ED Triage Vitals [23]   Temp Heart Rate Resp BP SpO2   98.9 °F (37.2 °C) (!) 155 (!) 30 142/85 100 %      Temp src Heart Rate Source Patient Position BP Location FiO2 (%)   Oral Monitor Lying Right arm --       GENERAL: This gentleman is ill-appearing.  He appears much older than his stated age..He appears thin and chronically malnourished and cachectic.  Vital signs on my initial evaluation he is tachycardic with his heart rate rating from 1 40-1 70 with some occasional PVCs and have the appearance of atrial fibrillation on the monitor.  His blood pressure initially was 142/80 5 repeat was 120/88.  His O2 sat is 100% on room air.  He is  tachypneic with his respiratory rate in the upper 20s.  He is afebrile.  HENT: nares patent  Head/neck/ face are symmetric without gross deformity, signs of trauma, or swelling  EYES: no scleral icterus, no conjunctival pallor.  NECK: Supple, no meningismus  CV: Tachycardia with irregular regular rhythm.  Systolic murmur 2 out of 6.  RESPIRATORY: He is tachypneic in respiratory distress.  His O2 sats 100% on room air.  Lungs are grossly clear.  ABDOMEN: soft and nontender.  Thin  MUSCULOSKELETAL: To his right upper extremity it his right forearm he has his dialysis catheter.  There is no bleeding.  He has a good thrill.  He has intact distal pulses.  NEURO: alert and slow to respond.  He has no focal motor or sensory changes.  Cranial nerves II through XII are grossly intact no focal weakness to upper or lower extremities.  He has generalized weakness.  SKIN: warm, dry    Vital signs and nursing notes reviewed.        LAB RESULTS  Recent Results (from the past 24 hour(s))   Green Top (Gel)    Collection Time: 11/28/23  9:19 PM   Result Value Ref Range    Extra Tube Hold for add-ons.    Lavender Top    Collection Time: 11/28/23  9:19 PM   Result Value Ref Range    Extra Tube hold for add-on    Gold Top - SST    Collection Time: 11/28/23  9:19 PM   Result Value Ref Range    Extra Tube Hold for add-ons.    Light Blue Top    Collection Time: 11/28/23  9:19 PM   Result Value Ref Range    Extra Tube Hold for add-ons.    Comprehensive Metabolic Panel    Collection Time: 11/28/23  9:19 PM    Specimen: Blood   Result Value Ref Range    Glucose 243 (H) 65 - 99 mg/dL    BUN 28 (H) 8 - 23 mg/dL    Creatinine 2.74 (H) 0.76 - 1.27 mg/dL    Sodium 140 136 - 145 mmol/L    Potassium 4.0 3.5 - 5.2 mmol/L    Chloride 97 (L) 98 - 107 mmol/L    CO2 26.3 22.0 - 29.0 mmol/L    Calcium 9.5 8.6 - 10.5 mg/dL    Total Protein 7.2 6.0 - 8.5 g/dL    Albumin 3.9 3.5 - 5.2 g/dL    ALT (SGPT) 31 1 - 41 U/L    AST (SGOT) 27 1 - 40 U/L    Alkaline  Phosphatase 81 39 - 117 U/L    Total Bilirubin 0.4 0.0 - 1.2 mg/dL    Globulin 3.3 gm/dL    A/G Ratio 1.2 g/dL    BUN/Creatinine Ratio 10.2 7.0 - 25.0    Anion Gap 16.7 (H) 5.0 - 15.0 mmol/L    eGFR 24.5 (L) >60.0 mL/min/1.73   Lipase    Collection Time: 11/28/23  9:19 PM    Specimen: Blood   Result Value Ref Range    Lipase 27 13 - 60 U/L   CBC Auto Differential    Collection Time: 11/28/23  9:19 PM    Specimen: Blood   Result Value Ref Range    WBC 6.63 3.40 - 10.80 10*3/mm3    RBC 4.31 4.14 - 5.80 10*6/mm3    Hemoglobin 11.3 (L) 13.0 - 17.7 g/dL    Hematocrit 37.4 (L) 37.5 - 51.0 %    MCV 86.8 79.0 - 97.0 fL    MCH 26.2 (L) 26.6 - 33.0 pg    MCHC 30.2 (L) 31.5 - 35.7 g/dL    RDW 13.1 12.3 - 15.4 %    RDW-SD 40.8 37.0 - 54.0 fl    MPV 10.9 6.0 - 12.0 fL    Platelets 240 140 - 450 10*3/mm3    Neutrophil % 83.3 (H) 42.7 - 76.0 %    Lymphocyte % 6.8 (L) 19.6 - 45.3 %    Monocyte % 7.5 5.0 - 12.0 %    Eosinophil % 0.2 (L) 0.3 - 6.2 %    Basophil % 0.2 0.0 - 1.5 %    Immature Grans % 2.0 (H) 0.0 - 0.5 %    Neutrophils, Absolute 5.53 1.70 - 7.00 10*3/mm3    Lymphocytes, Absolute 0.45 (L) 0.70 - 3.10 10*3/mm3    Monocytes, Absolute 0.50 0.10 - 0.90 10*3/mm3    Eosinophils, Absolute 0.01 0.00 - 0.40 10*3/mm3    Basophils, Absolute 0.01 0.00 - 0.20 10*3/mm3    Immature Grans, Absolute 0.13 (H) 0.00 - 0.05 10*3/mm3    nRBC 0.0 0.0 - 0.2 /100 WBC   Protime-INR    Collection Time: 11/28/23  9:19 PM    Specimen: Blood   Result Value Ref Range    Protime 15.1 (H) 11.7 - 14.2 Seconds    INR 1.18 (H) 0.90 - 1.10   BNP    Collection Time: 11/28/23  9:19 PM    Specimen: Blood   Result Value Ref Range    proBNP 59,481.0 (H) 0.0 - 900.0 pg/mL   High Sensitivity Troponin T    Collection Time: 11/28/23  9:19 PM    Specimen: Blood   Result Value Ref Range    HS Troponin T 176 (C) <22 ng/L   Magnesium    Collection Time: 11/28/23  9:19 PM    Specimen: Blood   Result Value Ref Range    Magnesium 2.0 1.6 - 2.4 mg/dL   Lactic Acid, Plasma     Collection Time: 11/28/23  9:20 PM    Specimen: Blood   Result Value Ref Range    Lactate 3.5 (C) 0.5 - 2.0 mmol/L   ECG 12 Lead Tachycardia    Collection Time: 11/28/23  9:23 PM   Result Value Ref Range    QT Interval 299 ms    QTC Interval 508 ms   Blood Gas, Arterial -    Collection Time: 11/28/23 10:23 PM    Specimen: Arterial Blood   Result Value Ref Range    Site Left Brachial     Reanto's Test N/A     pH, Arterial 7.555 (C) 7.350 - 7.450 pH units    pCO2, Arterial 29.6 (L) 35.0 - 45.0 mm Hg    pO2, Arterial 83.5 80.0 - 100.0 mm Hg    HCO3, Arterial 26.2 22.0 - 28.0 mmol/L    Base Excess, Arterial 4.4 (H) 0.0 - 2.0 mmol/L    O2 Saturation, Arterial 97.7 92.0 - 98.5 %    CO2 Content 27.1 (H) 23 - 27 mmol/L    Barometric Pressure for Blood Gas 756.4000 mmHg    Modality Room Air     Rate 18 Breaths/minute    Notified Who      Read Back      Notified Time      Hemodilution No     Device Comment Sat 97%    STAT Lactic Acid, Reflex    Collection Time: 11/29/23 12:26 AM    Specimen: Blood   Result Value Ref Range    Lactate 1.9 0.5 - 2.0 mmol/L   High Sensitivity Troponin T 2Hr    Collection Time: 11/29/23 12:26 AM    Specimen: Blood   Result Value Ref Range    HS Troponin T 157 (C) <22 ng/L    Troponin T Delta -19 (L) >=-4 - <+4 ng/L   POC Glucose Once    Collection Time: 11/29/23  2:55 AM    Specimen: Blood   Result Value Ref Range    Glucose 243 (H) 70 - 130 mg/dL       Ordered the above labs and independently reviewed the results.        RADIOLOGY  XR Chest 1 View    Result Date: 11/28/2023  SINGLE VIEW OF THE CHEST  HISTORY: Shortness of breath  COMPARISON: November 2030 is 23  FINDINGS: The patient status post median sternotomy with coronary artery bypass grafting. There is cardiomegaly. There is calcification of the aorta. No pneumothorax or pleural effusion is seen. No definite acute infiltrates are identified.      No acute findings.  This report was finalized on 11/28/2023 9:51 PM by Dr. Alice Allen,  M.D on Workstation: [id number]       I ordered the above noted radiological studies. Reviewed by me and discussed with radiologist.  See dictation for official radiology interpretation.      PROCEDURES    Procedures      MEDICATIONS GIVEN IN ER    Medications   sodium chloride 0.9 % flush 10 mL (has no administration in time range)   sodium chloride 0.9 % flush 10 mL (has no administration in time range)   sodium chloride 0.9 % flush 10 mL (10 mL Intravenous Given 11/29/23 0058)   sodium chloride 0.9 % flush 10 mL (has no administration in time range)   sodium chloride 0.9 % infusion 40 mL (has no administration in time range)   acetaminophen (TYLENOL) tablet 650 mg (has no administration in time range)     Or   acetaminophen (TYLENOL) 160 MG/5ML oral solution 650 mg (has no administration in time range)     Or   acetaminophen (TYLENOL) suppository 650 mg (has no administration in time range)   calcium carbonate (TUMS) chewable tablet 500 mg (200 mg elemental) (has no administration in time range)   dextrose (D50W) (25 g/50 mL) IV injection 25 g (has no administration in time range)   insulin lispro (HUMALOG/ADMELOG) injection 2-7 Units (has no administration in time range)   pantoprazole (PROTONIX) injection 80 mg (80 mg Intravenous Not Given 11/29/23 0058)   dilTIAZem (CARDIZEM) 100 mg in 100 mL NS infusion (ADV) (5 mg/hr Intravenous New Bag 11/29/23 0432)   pantoprazole (PROTONIX) 40 mg in 100 mL NS (VTB) (40 mg Intravenous New Bag 11/29/23 0432)   nitroglycerin (NITROSTAT) SL tablet 0.4 mg (has no administration in time range)   sodium chloride 0.9 % flush 10 mL (has no administration in time range)   sodium chloride 0.9 % flush 10 mL (has no administration in time range)   sodium chloride 0.9 % infusion 40 mL (has no administration in time range)   dextrose (GLUTOSE) oral gel 15 g (has no administration in time range)   dextrose (D50W) (25 g/50 mL) IV injection 25 g (has no administration in time range)    glucagon (GLUCAGEN) injection 1 mg (has no administration in time range)   insulin regular (humuLIN R,novoLIN R) injection 2-7 Units (has no administration in time range)   acetaminophen (TYLENOL) tablet 650 mg (has no administration in time range)     Or   acetaminophen (TYLENOL) suppository 650 mg (has no administration in time range)   ipratropium-albuterol (DUO-NEB) nebulizer solution 3 mL (has no administration in time range)   ondansetron (ZOFRAN) tablet 4 mg (has no administration in time range)     Or   ondansetron (ZOFRAN) injection 4 mg (has no administration in time range)   budesonide-formoterol (SYMBICORT) 160-4.5 MCG/ACT inhaler 2 puff (has no administration in time range)   levothyroxine (SYNTHROID, LEVOTHROID) tablet 75 mcg (has no administration in time range)   sodium bicarbonate tablet 650 mg (has no administration in time range)   sodium chloride 0.9 % bolus 500 mL (0 mL Intravenous Stopped 11/28/23 2210)   metoprolol tartrate (LOPRESSOR) injection 5 mg (5 mg Intravenous Given 11/28/23 2134)   ondansetron (ZOFRAN) injection 4 mg (4 mg Intravenous Given 11/28/23 2209)   metoprolol tartrate (LOPRESSOR) injection 5 mg (5 mg Intravenous Given 11/28/23 2209)   metoprolol tartrate (LOPRESSOR) injection 5 mg (5 mg Intravenous Given 11/29/23 0036)   sodium chloride 0.9 % bolus 500 mL (0 mL Intravenous Stopped 11/29/23 0139)   ondansetron (ZOFRAN) injection 4 mg (4 mg Intravenous Given 11/29/23 0057)         All labs have been independently reviewed by me.  All radiology studies have been reviewed by me and I discussed with radiologist dictating the report when indicated below.  All EKG's independently viewed and interpreted by me.  Discussion below represents my analysis of pertinent findings related to patient's condition, differential diagnosis, treatment plan and final disposition.        PROGRESS, DATA ANALYSIS, CONSULTS, AND MEDICAL DECISION MAKING  DDx includes CHF, acute coronary syndrome,  pulmonary embolism, pneumothorax, pneumonia, asthma/COPD,aspiration,  pulmonary hypertension, metabolic acidosis, deconditioning, anemia, other hematologic etiologies such as CO poisoning, anxiety.   This gentleman is ill-appearing.  Appears acutely ill on top of his chronic significant severe comorbidity and appearance of being malnourished.  He is tachycardic with atrial fibrillation.  He is afebrile with his blood pressure being normal.  On Yesenia give him some IV Lopressor.  He does take Coreg at home.  I am getting give him a 500 cc bolus of fluid.  He is adamant that he did have dialysis today.  Currently no family or friend present to provide any other history.  This gentleman will definitely need to be admitted to the hospital.  Informed him that we will check lab work chest x-ray and other test.  All questions answered      ED Course as of 11/29/23 0437   Tue Nov 28, 2023 2141 WBC: 6.63 [MM]   2141 Hemoglobin(!): 11.3  Chronic anemia [MM]   2141 INR(!): 1.18  Patient is on Eliquis [MM]   2141 My own independent interpretation of the EKG that was done at 9:23 PM reveals a rate of 173 I clinically suspect this is atrial fibrillation.  He has movement artifact  He has mild prolongation of the QRS complex.  I do not appreciate any definitive acute ST elevation or definitive acute injury pattern has some nonspecific ST depression which could be rate related.  Does have the appearance of a right bundle branch block    I compared this to the previous EKG that was done on 11/23/2023.  The QRS morphology looks very similar on this EKG.  His rate was much improved.  He was in atrial fibrillation at that time as well. [MM]   2208 I communicated with the patient's spouse Delta Mo.  I communicated with her over the phone and she is not able to make it here to the emergency department.  She informed me that she called paramedics because he was in the restroom and he had persistent vomiting and dry heaves.  He did soil  his pants as well but it was not really wet diarrhea.  She did not see any blood in the vomit or in the stool.  He is supposed to be on oxygen but he has been noncompliant with oxygen.  She believes it is 2 L.  He did have dialysis today.  When he was discharged from the hospital they have not been able to fill or take the Zofran since discharge.  He takes care of his medicines.  She is not certain whether he has had his medicines today.  She states that it is very possible that he did not take his medicines.  He does not take them all the time.  There is no report of any fever or feeling warm.  There is no report of any pain that he communicated with her.  She states that he became much more ill over the past several hours.  When I asked if he is a full code she states that there is nothing in writing but he is stated that he does not want any extraordinary measures to keep her alive.  She also mention to me that she can no longer care for him at home and would like a consult by social service for potential placement.  I informed her of his critical condition and the results of the tests and my initial concerns.  All questions answered at this time. [MM]   2210 On my reevaluation of the patient.  His heart rate has improved some.  His blood pressures remained stable.  We will give him another dose of Lopressor.  We will get a give him Zofran.  When I have reevaluated the patient he looks better he is not breathing as fast and his rapid his initial evaluation.  Denies any pain anywhere no headache, chest pain abdominal pain.  He does state that he has a very mild sore throat from the vomiting and would like a little water in his mouth to keep it moist.  I again informed him of the test that were running and all questions answered at this time.  His O2 sat is 100% on room air. [MM]   Wed Nov 29, 2023   0017 pH, Arterial(!!): 7.555 [MM]   0017 pCO2, Arterial(!): 29.6 [MM]   0017 Lactate(!!): 3.5 [MM]   0017 HS  Troponin T(!!): 176 [MM]   0018 Creatinine(!): 2.74  Chronic renal failure on dialysis. [MM]   0018 Chest x-ray is unremarkable for any acute process.  No obvious fluid overloaded no focal pulmonary consolidation or pneumothorax.  I did review the radiologist interpretation.  Please see complete dictated report from radiologist [MM]   0022 Patient's heart rate is still about 130s.  Sometimes ago little faster to 150s.  Blood pressure is normal.  Oxygen saturation normal.  He is not breathing as fast as initial arrival.  I definitely believe he probably had some component of hyperventilation especially because the blood gas.  He did have an episode of vomiting here.  Denies any pain.  He overall is looking much better than initial presentation here to the ER.  He is breathing easier.  Overall his heart rate is improved.  He is no longer dry heaving.  I will give another dose of Lopressor IV.  We will admit him to A and put a call out to cardiology for consult. [MM]   0036 I did talk with cardiology Dr. Castelan.  Informed him of the patient's vitals and the patient's presenting symptoms.  He states he would still periodically do the IV Lopressor.  He would stay away from digoxin.  If need be to get his rate to about 130s we could give him a very low dose of drip of IV Cardizem. [MM]   0042 I did discuss the case with Amber who is the midlevel provider on for [facility].  Informed her of the patient's presenting symptoms and my conversation with the cardiologist.  Informed her of the treatment that was done here and the results of the test.  Dr. Henson is the attending and agrees to admit the patient to the hospital. [MM]   0051 She just had some emesis.  It is dark-colored with liquid.  There could be a component of some coffee ground.  There is no gross blood appears to be more liquidy than viscous and typical coffee-ground emesis.  On any give a dose of Protonix.  Getting an over the dose of Zofran as well [MM]   0052  Lactate(!!): 3.5  Likely related because of his chronic renal failure [MM]   0111 This gentleman developed coffee-ground emesis.  Given his overall comorbidities his tachycardia his cardiomyopathy is chronic renal failure on dialysis and now upper GI bleed and will admit him to the intensive care unit.  We will can start him on a Protonix drip.  His wife states that he does have a history of peptic ulcer disease and has had upper GI bleed in the past. [MM]   0111 I discussed the case with the intensive care unit physician Erich Burroughs.  Informed him of the patient presenting symptoms.  He agrees to admit to the intensive care unit.  All questions answered [MM]      ED Course User Index  [MM] Damian Cook MD       AS OF 04:37 EST VITALS:    BP - 112/73  HR - 116  TEMP - 98 °F (36.7 °C) (Oral)  02 SATS - 94%    SOCIAL DETERMINANTS OF HEALTH THAT IMPACT OR LIMIT CARE (For example..Homelessness,safe discharge, inability to obtain care, follow up, or prescriptions):      DIAGNOSIS  Final diagnoses:   Atrial fibrillation with rapid ventricular response   Nausea and vomiting, unspecified vomiting type   End stage renal disease on dialysis   Coagulopathy: Eliquis induced   Upper GI bleed         DISPOSITION  Patient admitted to the intensive care unit          DICTATED UTILIZING DRAGON DICTATION    Note Disclaimer: At [facility], we believe that sharing information builds trust and better relationships. You are receiving this note because you recently visited [facility]. It is possible you will see health information before a provider has talked with you about it. This kind of information can be easy to misunderstand. To help you fully understand what it means for your health, we urge you to discuss this note with your provider.       Damian Cook MD  11/29/23 0432

## 2023-11-29 NOTE — H&P
[facility]    CC: [facility]    HPI:  Mr. Mo is a 67yo male with multiple medical  problems.  Patient has been having ongoing nausea and vomiting it is unclear how long this has been going on but note prior ER visit with n/v 11/24.  He is unable to provide much history.  He is rather somnolent and doesn't really particpate much and is confused in general. He was evaluated in the ER and he was hypotensive in afib with RVR.  BP has improved somewhat now. While in ER he had an episode of hematemsis sounds like prior vomitus was not bloody but history is very uncertain and difficult to obtain. He says he has some abdominal discomfort.     Past Medical History:   Diagnosis Date    Acquired hypothyroidism     Acute congestive heart failure     Acute respiratory failure with hypoxia     Acute sinusitis     SYLVAIN (acute kidney injury)     on CKD    Anemia     Arthritis     CAD (coronary artery disease)     Cancer     skin    Chronic combined systolic and diastolic congestive heart failure     Chronic ischemic heart disease     Chronic kidney disease (CKD)     CKD (chronic kidney disease)     COPD (chronic obstructive pulmonary disease)     Depression     Diabetes mellitus type 2, uncontrolled, without complications     Diabetic neuropathy     Disease of thyroid gland     Elevated cholesterol     Fatigue     Frequent PVCs     Generalized weakness     GERD (gastroesophageal reflux disease)     Heart attack     History of coronary artery disease     with remote history of bypass surgery in 2001    Hyperlipidemia     Hypertension     Hypertensive encephalopathy     Mental status change     Acute    NO DEVICE/RECALLED     Pleural effusion     Pneumonia     Poor historian     RBBB (right bundle branch block)     Stroke     POOR VISION    TIA (transient ischemic attack)     Type 2 diabetes mellitus     Urinary retention     Vitamin D deficiency      Social History     Socioeconomic History    Marital status:       Spouse name: Lissette    Number of children: 3    Years of education: college   Tobacco Use    Smoking status: Never    Smokeless tobacco: Never    Tobacco comments:     CAFFEINE - OCCAS. SODA   Vaping Use    Vaping Use: Never used   Substance and Sexual Activity    Alcohol use: No     Comment: last drink 2 months ago    Drug use: No    Sexual activity: Defer     Family History   Problem Relation Age of Onset    Diabetes Mother     Hypertension Mother     Macular degeneration Mother     Alcohol abuse Father     Cancer Father         lung,  age 65    Heart disease Father     Alcohol abuse Brother     Cirrhosis Brother          age 50    Liver cancer Brother 45    Diabetes Son     Kidney failure Son     Autism Son     Malig Hyperthermia Neg Hx      MEDS: reviewed as per chart  ALL: prozac  ROS: UTO 2/2 mental status    Vital Sign Min/Max for last 24 hours  Temp  Min: 98.9 °F (37.2 °C)  Max: 98.9 °F (37.2 °C)   BP  Min: 110/95  Max: 148/126   Pulse  Min: 108  Max: 174   Resp  Min: 30  Max: 30   SpO2  Min: 91 %  Max: 100 %   Flow (L/min)  Min: 2  Max: 2   Weight  Min: 61.3 kg (135 lb 2.3 oz)  Max: 61.3 kg (135 lb 2.3 oz)     GEN:   appears ill, lethargic, sleepy, arouses some gives name, confused, unkempt apperance.   HEENT: PERRL, EOMI, normal sclera, mmm, no JVD, trachea midline, neck supple  CHEST: CTA bilat, no wheezes, no crackles, no use of accessory muscles  CV: irrg, tachy, no m/g/r  ABD: soft, nt, nd +bs, no hepatosplenomegaly  EXT: no c/c/e  SKIN: no rashes, no xanthomas, nl turgor, warm, dry  LYMPH: no palpable cervical or supraclavicular lymphadenopathy  NEURO: CN 2-12 intact and symmetric bilaterally  PSYCH: flat affect, impaired orientation, impaired judgment,   MSK: no kyphoscoliosis, moves all 4 extremities.     Lab: : reviewed:  Lactate 1.9  Trop 157  7.55//83  Glucose 243  Bun 28  Cr 2.7  Bicarb 26  Wbc 6.6  Hgb 11.3  Plts 240  CXR: : lung fields look clear to my  read      A/P:  1. Afib with rvr -- cardiazem gtt, cardiology to see  2. Hypotension -- pressures improved at this point, monitor  3. Acute upper gi bleed -- monitor hgb, protonix gtt, gi to see  4. ESRD -- nephrology to follow  5. DMII -- ssi  6. Copd -- stable continue bronchodilators  7. Hypothryoidism - continue synthroid, check tsh, t4  8. Nausea/vomiting    Patient appears poorly able to care for himself at home    CC 37 mins.

## 2023-11-30 LAB
ALBUMIN SERPL-MCNC: 3 G/DL (ref 3.5–5.2)
ANION GAP SERPL CALCULATED.3IONS-SCNC: 13.7 MMOL/L (ref 5–15)
BASOPHILS # BLD AUTO: 0.02 10*3/MM3 (ref 0–0.2)
BASOPHILS NFR BLD AUTO: 0.3 % (ref 0–1.5)
BUN SERPL-MCNC: 44 MG/DL (ref 8–23)
BUN/CREAT SERPL: 11.5 (ref 7–25)
CALCIUM SPEC-SCNC: 8.6 MG/DL (ref 8.6–10.5)
CHLORIDE SERPL-SCNC: 99 MMOL/L (ref 98–107)
CO2 SERPL-SCNC: 24.3 MMOL/L (ref 22–29)
CREAT SERPL-MCNC: 3.83 MG/DL (ref 0.76–1.27)
DEPRECATED RDW RBC AUTO: 40.5 FL (ref 37–54)
EGFRCR SERPLBLD CKD-EPI 2021: 16.4 ML/MIN/1.73
EOSINOPHIL # BLD AUTO: 0.05 10*3/MM3 (ref 0–0.4)
EOSINOPHIL NFR BLD AUTO: 0.7 % (ref 0.3–6.2)
ERYTHROCYTE [DISTWIDTH] IN BLOOD BY AUTOMATED COUNT: 13.4 % (ref 12.3–15.4)
GEN 5 2HR TROPONIN T REFLEX: 170 NG/L
GLUCOSE BLDC GLUCOMTR-MCNC: 106 MG/DL (ref 70–130)
GLUCOSE BLDC GLUCOMTR-MCNC: 125 MG/DL (ref 70–130)
GLUCOSE BLDC GLUCOMTR-MCNC: 137 MG/DL (ref 70–130)
GLUCOSE BLDC GLUCOMTR-MCNC: 182 MG/DL (ref 70–130)
GLUCOSE BLDC GLUCOMTR-MCNC: 206 MG/DL (ref 70–130)
GLUCOSE BLDC GLUCOMTR-MCNC: 231 MG/DL (ref 70–130)
GLUCOSE SERPL-MCNC: 133 MG/DL (ref 65–99)
HCT VFR BLD AUTO: 28.6 % (ref 37.5–51)
HCT VFR BLD AUTO: 28.6 % (ref 37.5–51)
HCT VFR BLD AUTO: 29.6 % (ref 37.5–51)
HCT VFR BLD AUTO: 30.3 % (ref 37.5–51)
HGB BLD-MCNC: 9.3 G/DL (ref 13–17.7)
IMM GRANULOCYTES # BLD AUTO: 0.14 10*3/MM3 (ref 0–0.05)
IMM GRANULOCYTES NFR BLD AUTO: 1.9 % (ref 0–0.5)
LYMPHOCYTES # BLD AUTO: 1.1 10*3/MM3 (ref 0.7–3.1)
LYMPHOCYTES NFR BLD AUTO: 14.9 % (ref 19.6–45.3)
MAGNESIUM SERPL-MCNC: 2 MG/DL (ref 1.6–2.4)
MCH RBC QN AUTO: 27.4 PG (ref 26.6–33)
MCHC RBC AUTO-ENTMCNC: 32.5 G/DL (ref 31.5–35.7)
MCV RBC AUTO: 84.1 FL (ref 79–97)
MONOCYTES # BLD AUTO: 0.63 10*3/MM3 (ref 0.1–0.9)
MONOCYTES NFR BLD AUTO: 8.5 % (ref 5–12)
NEUTROPHILS NFR BLD AUTO: 5.46 10*3/MM3 (ref 1.7–7)
NEUTROPHILS NFR BLD AUTO: 73.7 % (ref 42.7–76)
NRBC BLD AUTO-RTO: 0 /100 WBC (ref 0–0.2)
PHOSPHATE SERPL-MCNC: 3 MG/DL (ref 2.5–4.5)
PLATELET # BLD AUTO: 235 10*3/MM3 (ref 140–450)
PMV BLD AUTO: 10.6 FL (ref 6–12)
POTASSIUM SERPL-SCNC: 3.5 MMOL/L (ref 3.5–5.2)
QT INTERVAL: 428 MS
QTC INTERVAL: 507 MS
RBC # BLD AUTO: 3.4 10*6/MM3 (ref 4.14–5.8)
SODIUM SERPL-SCNC: 137 MMOL/L (ref 136–145)
TROPONIN T DELTA: -3 NG/L
TROPONIN T SERPL HS-MCNC: 173 NG/L
WBC NRBC COR # BLD AUTO: 7.4 10*3/MM3 (ref 3.4–10.8)

## 2023-11-30 PROCEDURE — 85014 HEMATOCRIT: CPT | Performed by: INTERNAL MEDICINE

## 2023-11-30 PROCEDURE — 25010000002 HEPARIN (PORCINE) PER 1000 UNITS: Performed by: INTERNAL MEDICINE

## 2023-11-30 PROCEDURE — 99232 SBSQ HOSP IP/OBS MODERATE 35: CPT | Performed by: INTERNAL MEDICINE

## 2023-11-30 PROCEDURE — 80069 RENAL FUNCTION PANEL: CPT | Performed by: INTERNAL MEDICINE

## 2023-11-30 PROCEDURE — 25810000003 SODIUM CHLORIDE 0.9 % SOLUTION: Performed by: INTERNAL MEDICINE

## 2023-11-30 PROCEDURE — 85025 COMPLETE CBC W/AUTO DIFF WBC: CPT | Performed by: INTERNAL MEDICINE

## 2023-11-30 PROCEDURE — 63710000001 INSULIN REGULAR HUMAN PER 5 UNITS: Performed by: INTERNAL MEDICINE

## 2023-11-30 PROCEDURE — 94760 N-INVAS EAR/PLS OXIMETRY 1: CPT

## 2023-11-30 PROCEDURE — 93010 ELECTROCARDIOGRAM REPORT: CPT | Performed by: INTERNAL MEDICINE

## 2023-11-30 PROCEDURE — 83735 ASSAY OF MAGNESIUM: CPT | Performed by: INTERNAL MEDICINE

## 2023-11-30 PROCEDURE — 94799 UNLISTED PULMONARY SVC/PX: CPT

## 2023-11-30 PROCEDURE — 84484 ASSAY OF TROPONIN QUANT: CPT | Performed by: INTERNAL MEDICINE

## 2023-11-30 PROCEDURE — 5A1D70Z PERFORMANCE OF URINARY FILTRATION, INTERMITTENT, LESS THAN 6 HOURS PER DAY: ICD-10-PCS | Performed by: INTERNAL MEDICINE

## 2023-11-30 PROCEDURE — 94664 DEMO&/EVAL PT USE INHALER: CPT

## 2023-11-30 PROCEDURE — 82948 REAGENT STRIP/BLOOD GLUCOSE: CPT

## 2023-11-30 PROCEDURE — 85018 HEMOGLOBIN: CPT | Performed by: INTERNAL MEDICINE

## 2023-11-30 PROCEDURE — 25010000002 DIGOXIN PER 500 MCG: Performed by: INTERNAL MEDICINE

## 2023-11-30 PROCEDURE — 94761 N-INVAS EAR/PLS OXIMETRY MLT: CPT

## 2023-11-30 PROCEDURE — 93005 ELECTROCARDIOGRAM TRACING: CPT | Performed by: INTERNAL MEDICINE

## 2023-11-30 PROCEDURE — 99232 SBSQ HOSP IP/OBS MODERATE 35: CPT | Performed by: NURSE PRACTITIONER

## 2023-11-30 RX ORDER — HEPARIN SODIUM 1000 [USP'U]/ML
2000 INJECTION, SOLUTION INTRAVENOUS; SUBCUTANEOUS ONCE
Status: COMPLETED | OUTPATIENT
Start: 2023-11-30 | End: 2023-11-30

## 2023-11-30 RX ORDER — MANNITOL 250 MG/ML
12.5 INJECTION, SOLUTION INTRAVENOUS AS NEEDED
Status: ACTIVE | OUTPATIENT
Start: 2023-11-30 | End: 2023-11-30

## 2023-11-30 RX ORDER — DIGOXIN 0.25 MG/ML
250 INJECTION INTRAMUSCULAR; INTRAVENOUS ONCE
Status: COMPLETED | OUTPATIENT
Start: 2023-11-30 | End: 2023-11-30

## 2023-11-30 RX ADMIN — SODIUM CHLORIDE 40 MG: 9 INJECTION, SOLUTION INTRAVENOUS at 07:33

## 2023-11-30 RX ADMIN — BUDESONIDE AND FORMOTEROL FUMARATE DIHYDRATE 2 PUFF: 160; 4.5 AEROSOL RESPIRATORY (INHALATION) at 08:23

## 2023-11-30 RX ADMIN — Medication 10 ML: at 20:02

## 2023-11-30 RX ADMIN — SODIUM CHLORIDE 40 MG: 9 INJECTION, SOLUTION INTRAVENOUS at 13:06

## 2023-11-30 RX ADMIN — DIGOXIN 250 MCG: 0.25 INJECTION INTRAMUSCULAR; INTRAVENOUS at 08:33

## 2023-11-30 RX ADMIN — INSULIN HUMAN 3 UNITS: 100 INJECTION, SOLUTION PARENTERAL at 18:12

## 2023-11-30 RX ADMIN — SODIUM CHLORIDE 5 MG/HR: 900 INJECTION, SOLUTION INTRAVENOUS at 13:05

## 2023-11-30 RX ADMIN — SODIUM CHLORIDE 8 MG/HR: 9 INJECTION, SOLUTION INTRAVENOUS at 03:00

## 2023-11-30 RX ADMIN — SODIUM CHLORIDE 500 ML: 9 INJECTION, SOLUTION INTRAVENOUS at 11:00

## 2023-11-30 RX ADMIN — DIGOXIN 250 MCG: 0.25 INJECTION INTRAMUSCULAR; INTRAVENOUS at 10:25

## 2023-11-30 RX ADMIN — LEVOTHYROXINE SODIUM 75 MCG: 75 TABLET ORAL at 05:38

## 2023-11-30 RX ADMIN — Medication 10 ML: at 08:24

## 2023-11-30 RX ADMIN — SODIUM BICARBONATE 650 MG: 650 TABLET, ORALLY DISINTEGRATING ORAL at 16:49

## 2023-11-30 RX ADMIN — SODIUM CHLORIDE 40 MG: 9 INJECTION, SOLUTION INTRAVENOUS at 20:02

## 2023-11-30 RX ADMIN — INSULIN HUMAN 2 UNITS: 100 INJECTION, SOLUTION PARENTERAL at 23:39

## 2023-11-30 RX ADMIN — HEPARIN SODIUM 2000 UNITS: 1000 INJECTION, SOLUTION INTRAVENOUS; SUBCUTANEOUS at 07:25

## 2023-11-30 NOTE — DISCHARGE PLACEMENT REQUEST
"Carlito Mo (68 y.o. Male)       Date of Birth   1955    Social Security Number       Address   [address] [place], [place] [address]    Home Phone   [phone]    MRN   [id number]       [facility]    Marital Status                               Admission Date   11/28/23    Admission Type   Emergency    Admitting Provider       Attending Provider   Bernardo Amaya MD    Department, Room/Bed   [facility] INTENSIVE CARE, I378/1       Discharge Date       Discharge Disposition       Discharge Destination                                 Attending Provider: Bernardo Amaya MD    Allergies: Prozac [Fluoxetine Hcl]    Isolation: None   Infection: None   Code Status: CPR    Ht: 167.6 cm (66\")   Wt: 67 kg (147 lb 11.3 oz)    Admission Cmt: None   Principal Problem: Atrial fibrillation with rapid ventricular response [I48.91]                   Active Insurance as of 11/28/2023       Primary Coverage       Payor Plan Insurance Group Employer/Plan Group    ANTHEM MEDICARE REPLACEMENT ANTHEM MEDICARE ADVANTAGE [id number]       Payor Plan Address Payor Plan Phone Number Payor Plan Fax Number Effective Dates    [address] [phone]  2/1/2023 - None Entered    [facility] [address]         Subscriber Name Subscriber Birth Date Member ID       CARLITO MO 1955 [id number]                     Emergency Contacts        (Rel.) Home Phone Work Phone Mobile Phone    Lissette Mo (Spouse) [phone] -- [phone]                "

## 2023-11-30 NOTE — DISCHARGE PLACEMENT REQUEST
"Carlito Mo (68 y.o. Male)       Date of Birth   1955    Social Security Number       Address   [address] [place], [place] [address]    Home Phone   [phone]    MRN   [id number]       [facility]    Marital Status                               Admission Date   11/28/23    Admission Type   Emergency    Admitting Provider       Attending Provider   Bernardo Amaya MD    Department, Room/Bed   [facility] INTENSIVE CARE, I378/1       Discharge Date       Discharge Disposition       Discharge Destination                                 Attending Provider: Bernardo Amaya MD    Allergies: Prozac [Fluoxetine Hcl]    Isolation: None   Infection: None   Code Status: CPR    Ht: 167.6 cm (66\")   Wt: 67 kg (147 lb 11.3 oz)    Admission Cmt: None   Principal Problem: Atrial fibrillation with rapid ventricular response [I48.91]                   Active Insurance as of 11/28/2023       Primary Coverage       Payor Plan Insurance Group Employer/Plan Group    ANTHEM MEDICARE REPLACEMENT ANTHEM MEDICARE ADVANTAGE [id number]       Payor Plan Address Payor Plan Phone Number Payor Plan Fax Number Effective Dates    [address] [phone]  2/1/2023 - None Entered    [facility] [address]         Subscriber Name Subscriber Birth Date Member ID       CARLITO MO 1955 [id number]                     Emergency Contacts        (Rel.) Home Phone Work Phone Mobile Phone    Lissette Mo (Spouse) [phone] -- [phone]                "

## 2023-11-30 NOTE — PROGRESS NOTES
Gastroenterology   Inpatient Progress Note    Reason for Follow Up: Coffee-ground emesis    Subjective     Interval History:   Last dose of Plavix was given on 11/28.  Patient denies having any further emesis.  Reports mild abdominal discomfort.  Has not had a bowel movement.  Reports that his last bowel movement consisted of diarrhea and occurred prior to his admission.    Current Facility-Administered Medications:     acetaminophen (TYLENOL) tablet 650 mg, 650 mg, Oral, Q4H PRN **OR** acetaminophen (TYLENOL) 160 MG/5ML oral solution 650 mg, 650 mg, Oral, Q4H PRN **OR** acetaminophen (TYLENOL) suppository 650 mg, 650 mg, Rectal, Q4H PRN, Amber Agudelo APRN    acetaminophen (TYLENOL) tablet 650 mg, 650 mg, Oral, Q4H PRN **OR** acetaminophen (TYLENOL) suppository 650 mg, 650 mg, Rectal, Q4H PRN, Erich García MD    budesonide-formoterol (SYMBICORT) 160-4.5 MCG/ACT inhaler 2 puff, 2 puff, Inhalation, BID - RT, Erich García MD, 2 puff at 11/30/23 0823    calcium carbonate (TUMS) chewable tablet 500 mg (200 mg elemental), 2 tablet, Oral, BID PRN, Amber Agudelo APRN    dextrose (D50W) (25 g/50 mL) IV injection 25 g, 25 g, Intravenous, Q15 Min PRN, Amber Agudelo APRN    dextrose (D50W) (25 g/50 mL) IV injection 25 g, 25 g, Intravenous, Q15 Min PRN, Erich García MD    dextrose (GLUTOSE) oral gel 15 g, 15 g, Oral, Q15 Min PRN, Erich García MD    dilTIAZem (CARDIZEM) 100 mg in 100 mL NS infusion (ADV), 5-15 mg/hr, Intravenous, Titrated, Mariano Garcia MD, Last Rate: 5 mL/hr at 11/30/23 1305, 5 mg/hr at 11/30/23 1305    glucagon (GLUCAGEN) injection 1 mg, 1 mg, Intramuscular, Q15 Min PRN, Erich García MD    insulin regular (humuLIN R,novoLIN R) injection 2-7 Units, 2-7 Units, Subcutaneous, Q6H, Erich García MD, 4 Units at 11/29/23 1751    ipratropium-albuterol (DUO-NEB) nebulizer solution 3 mL, 3 mL, Nebulization, Q6H PRN, Erich García MD    levothyroxine (SYNTHROID,  LEVOTHROID) tablet 75 mcg, 75 mcg, Oral, Q AM, Erich García MD, 75 mcg at 11/30/23 0538    mannitol 25% (RENAL) injection, 12.5 g, Intravenous, PRN, Cameron Olguin MD    nitroglycerin (NITROSTAT) SL tablet 0.4 mg, 0.4 mg, Sublingual, Q5 Min PRN, Erich García MD    ondansetron (ZOFRAN) tablet 4 mg, 4 mg, Oral, Q6H PRN **OR** ondansetron (ZOFRAN) injection 4 mg, 4 mg, Intravenous, Q6H PRN, Erich García MD    pantoprazole (PROTONIX) 40 mg in 100 mL NS (VTB), 40 mg, Intravenous, Continuous, Erich García MD, Last Rate: 20 mL/hr at 11/30/23 1306, 40 mg at 11/30/23 1306    pantoprazole (PROTONIX) injection 80 mg, 80 mg, Intravenous, Once, Damian Cook MD    sodium bicarbonate tablet 650 mg, 650 mg, Oral, TID, Erich García MD, 650 mg at 11/29/23 2039    sodium chloride 0.9 % bolus 1,000 mL, 1,000 mL, Intravenous, PRN, Cameron Olguin MD    sodium chloride 0.9 % bolus 500 mL, 500 mL, Intravenous, Once, Bernardo Amaya MD    sodium chloride 0.9 % flush 10 mL, 10 mL, Intravenous, PRN, Damian Cook MD    Insert Peripheral IV, , , Once **AND** sodium chloride 0.9 % flush 10 mL, 10 mL, Intravenous, PRN, Damian Cook MD    sodium chloride 0.9 % flush 10 mL, 10 mL, Intravenous, Q12H, Amber Agudelo APRN, 10 mL at 11/30/23 0824    sodium chloride 0.9 % flush 10 mL, 10 mL, Intravenous, PRN, Amber Agudelo APRN    sodium chloride 0.9 % flush 10 mL, 10 mL, Intravenous, Q12H, Erich García MD, 10 mL at 11/30/23 0824    sodium chloride 0.9 % flush 10 mL, 10 mL, Intravenous, PRN, Erich García MD    sodium chloride 0.9 % infusion 40 mL, 40 mL, Intravenous, PRN, Amber Agudelo APRN    sodium chloride 0.9 % infusion 40 mL, 40 mL, Intravenous, PRN, Erich García MD  Review of Systems:    All systems were reviewed and negative except for:  Gastrointestinal: positive for  pain    Objective     Vital Signs  Temp:  [97.6 °F (36.4 °C)-98.2 °F (36.8 °C)] 98.1 °F (36.7  °C)  Heart Rate:  [] 96  Resp:  [16-20] 18  BP: ()/() 100/76  Body mass index is 23.84 kg/m².    Intake/Output Summary (Last 24 hours) at 11/30/2023 1347  Last data filed at 11/30/2023 0500  Gross per 24 hour   Intake 465 ml   Output 100 ml   Net 365 ml     No intake/output data recorded.     Physical Exam:   General: patient awake, alert and cooperative   Eyes: no scleral icterus   Skin: warm and dry, not jaundiced   Abdomen: soft, nontender, nondistended; normal bowel sounds, no masses palpated, no periumbical lymphadenopathy   Psychiatric: Appropriate affect and behavior     Results Review:     I reviewed the patient's new clinical results.  I reviewed the patient's new imaging results and agree with the interpretation.  I reviewed the patient's other test results and agree with the interpretation    Results from last 7 days   Lab Units 11/30/23  0226 11/30/23  0040 11/29/23  1622 11/29/23  0756 11/29/23  0338   WBC 10*3/mm3  --  7.40  --  5.51 5.39   HEMOGLOBIN g/dL 9.3* 9.3*  9.3* 11.2* 10.0*  10.0* 10.1*   HEMATOCRIT % 29.6* 28.6*  28.6* 36.1* 31.5*  31.5* 32.0*   PLATELETS 10*3/mm3  --  235  --  220 176     Results from last 7 days   Lab Units 11/30/23  0040 11/29/23  0756 11/28/23  2119 11/24/23  1836   SODIUM mmol/L 137 138 140 133*   POTASSIUM mmol/L 3.5 3.2* 4.0 3.7   CHLORIDE mmol/L 99 101 97* 98   CO2 mmol/L 24.3 26.3 26.3 22.9   BUN mg/dL 44* 36* 28* 44*   CREATININE mg/dL 3.83* 3.24* 2.74* 3.70*   CALCIUM mg/dL 8.6 8.8 9.5 9.1   BILIRUBIN mg/dL  --  0.3 0.4 0.4   ALK PHOS U/L  --  66 81 74   ALT (SGPT) U/L  --  23 31 17   AST (SGOT) U/L  --  19 27 22   GLUCOSE mg/dL 133* 133* 243* 323*     Results from last 7 days   Lab Units 11/28/23 2119 11/23/23  1446   INR  1.18* 1.21*     Lab Results   Lab Value Date/Time    LIPASE 27 11/28/2023 2119    LIPASE 31 11/24/2023 1836    LIPASE 33 12/13/2021 1614    LIPASE 58 11/30/2017 2155    LIPASE 30 08/27/2016 1627    LIPASE 30 02/14/2016  0635       Radiology:  XR Chest 1 View   Final Result   No acute findings.       This report was finalized on 11/28/2023 9:51 PM by Dr. Alice Allen M.D on Workstation: [id number]              Assessment & Plan     Active Hospital Problems    Diagnosis     **Atrial fibrillation with rapid ventricular response        Assessment:  Nausea and vomiting  Coffee-ground emesis  Diarrhea  CAD on DOAC  Chronic combined CHF  Cardiomyopathy  Right bundle branch block  ESRD on HD  Altered mental status  COPD on home O2  DM  Elevated troponin secondary to ESRD and possible type II NSTEMI    These problems are new to me.  Plan:  Patient appears to have a possible viral gastroenteritis with associated nausea, vomiting, diarrhea.  Continue supportive care  He does not appear to have any large-volume upper GI bleeding at this time.  Symptoms could be secondary to possible small Vanessa-Casarez tear  Continue to hold oral anticoagulation  Continue PPI IV BID  Continue to monitor H/H-stable with Hgb of 9.3  Cardiology following for highly elevated at bedtime troponin in the setting of A-fib/RVR.  Patient remains on Cardizem gtt for rate control  Recommend EGD for further evaluation once patient stabilized    I discussed the patients findings and my recommendations with patient and nursing staff.    JI Mathur APRN  [facility] [address] [address]  Office: [phone]

## 2023-11-30 NOTE — PROGRESS NOTES
LOS: 1 day   Patient Care Team:  Florencia Caballero MD as PCP - General (Internal Medicine)  Amber Murguia MD as Consulting Physician (Cardiology)  [facility] as Pharmacist  Seth Ladd MD as Surgeon (General Surgery)  Kim Hoyos MD PhD as Consulting Physician (Hematology and Oncology)  Jerome Diallo MD as Referring Physician (Family Medicine)  Jose Enrique Louie MD as Consulting Physician (Gastroenterology)  Nima Huddleston MD as Consulting Physician (Nephrology)    Chief Complaint: Follow-up coffee-ground emesis, chronic combined CHF, paroxysmal atrial fibrillation with RVR.    Interval History: Still feels poorly in general.  He did get hypotensive and tachycardic with dialysis.  He is not feeling the atrial fibrillation.  He denied any significant shortness of breath or chest pain.    Vital Signs:  Temp:  [97.6 °F (36.4 °C)-98.2 °F (36.8 °C)] 98.1 °F (36.7 °C)  Heart Rate:  [] 96  Resp:  [16-20] 18  BP: ()/() 100/76    Intake/Output Summary (Last 24 hours) at 11/30/2023 1432  Last data filed at 11/30/2023 0500  Gross per 24 hour   Intake 465 ml   Output 100 ml   Net 365 ml       Physical Exam:   General Appearance:    No acute distress, alert and oriented x4, pale, chronically ill-appearing.   Lungs:     Clear to auscultation bilaterally     Heart:    Irregularly irregular with a tachycardic rate.  No murmurs, gallops, or rubs noted.    Abdomen:     Soft, nontender, nondistended.    Extremities:   No clubbing, cyanosis, or edema.     Results Review:    Results from last 7 days   Lab Units 11/30/23  0040   SODIUM mmol/L 137   POTASSIUM mmol/L 3.5   CHLORIDE mmol/L 99   CO2 mmol/L 24.3   BUN mg/dL 44*   CREATININE mg/dL 3.83*   GLUCOSE mg/dL 133*   CALCIUM mg/dL 8.6     Results from last 7 days   Lab Units 11/30/23  0226 11/30/23  0040 11/29/23  0756   HSTROP T ng/L 170* 173* 155*     Results from last 7 days   Lab Units 11/30/23  0226 11/30/23  0040   WBC  10*3/mm3  --  7.40   HEMOGLOBIN g/dL 9.3* 9.3*  9.3*   HEMATOCRIT % 29.6* 28.6*  28.6*   PLATELETS 10*3/mm3  --  235     Results from last 7 days   Lab Units 11/28/23  2119 11/23/23  1446   INR  1.18* 1.21*   APTT seconds  --  34.8         Results from last 7 days   Lab Units 11/30/23  0040   MAGNESIUM mg/dL 2.0           I reviewed the patient's new clinical results.        Assessment:  1.  Recent vomiting and diarrhea, possibly viral gastroenteritis  2.  Coffee-ground emesis  3.  Paroxysmal atrial fibrillation with RVR  4.  Chronic combined CHF  5.  Cardiomyopathy, EF 40 to 45% on 3/8/2023  6.  Coronary artery disease, status post CABG in 2001  7.  History of embolic CVA, on chronic anticoagulation  8.  End-stage renal disease, on hemodialysis  9.  Altered mental status, likely multifactorial  10.  Anemia of chronic disease  11.  COPD without acute exacerbation (on home O2)  12.  Right bundle branch block, chronic  13.  Elevated troponin secondary to renal disease and type II NSTEMI secondary to #1 and #3  14.  Diabetes    Plan:  -Did have some hypotension and increased atrial fibrillation while on dialysis earlier today.  He remains on minimal Cardizem as his blood pressure will tolerate.  I did give him a total of 500 mcg of IV digoxin.  However, because he is in end-stage renal disease patient, this is the maximum that I can give for now.    -Troponin elevation is secondary to renal disease and a type II NSTEMI secondary to the atrial fibrillation with RVR.  He is cleared acceptable cardiac risk to proceed with an EGD when appropriate per GI.    -Spoke with Dr. Amaya.  He may actually be dry at this point.  He is going to get some fluid repletion.    -Globin is fairly stable.  Holding Eliquis because of potential GI bleeding.  He is on IV Protonix.    Mauricio Corona MD  11/30/23  14:32 EST

## 2023-11-30 NOTE — PROGRESS NOTES
[facility] Progress Note      Patient Name: Carlito Mo  : 1955  MRN: [id number]  Primary Care Physician:  Florencia Caballero MD  Date of admission: 2023    Subjective     Interval History:   Follow-up end-stage renal disease    The patient is seen and examined while on dialysis, his potassium is low he will be switched to 4K bath.  Denies chest pain or shortness of air, remains in atrial fibrillation but the rate is controlled with the diltiazem.    Review of Systems:   As noted above    Objective     Vitals:   Temp:  [97.6 °F (36.4 °C)-98.2 °F (36.8 °C)] 97.6 °F (36.4 °C)  Heart Rate:  [] 114  Resp:  [16-20] 16  BP: ()/() 124/93    Intake/Output Summary (Last 24 hours) at 2023 0832  Last data filed at 2023 0500  Gross per 24 hour   Intake 465 ml   Output 100 ml   Net 365 ml       Physical Exam:    General Appearance: alert, awake, chronically ill, no acute distress   Skin: warm and dry  HEENT: oral mucosa normal, nonicteric sclera  Neck: supple, no JVD  Lungs: Bilateral rhonchi, breathing effort not labored  Heart: Irregularly irregular, no rub  Abdomen: soft, nontender, nondistended  : no palpable bladder  Extremities: no edema, cyanosis or clubbing, functional AV fistula in the right forearm with good thrill and bruit  Neuro: Moving all extremities    Ultrafiltration goal 1600 cc,  cc/min, blood pressure 116/91    Scheduled Meds:     budesonide-formoterol, 2 puff, Inhalation, BID - RT  digoxin, 250 mcg, Intravenous, Once  insulin regular, 2-7 Units, Subcutaneous, Q6H  levothyroxine, 75 mcg, Oral, Q AM  pantoprazole, 80 mg, Intravenous, Once  sodium bicarbonate, 650 mg, Oral, TID  sodium chloride, 10 mL, Intravenous, Q12H  sodium chloride, 10 mL, Intravenous, Q12H      IV Meds:   dilTIAZem, 5-15 mg/hr, Last Rate: 5 mg/hr (23 1709)  pantoprazole, 40 mg, Last Rate: 40 mg (23 6854)        Results Reviewed:   I have  personally reviewed the results from the time of this admission to 11/30/2023 08:32 EST     Results from last 7 days   Lab Units 11/30/23  0040 11/29/23  0756 11/28/23 2119 11/24/23  1836   SODIUM mmol/L 137 138 140 133*   POTASSIUM mmol/L 3.5 3.2* 4.0 3.7   CHLORIDE mmol/L 99 101 97* 98   CO2 mmol/L 24.3 26.3 26.3 22.9   BUN mg/dL 44* 36* 28* 44*   CREATININE mg/dL 3.83* 3.24* 2.74* 3.70*   CALCIUM mg/dL 8.6 8.8 9.5 9.1   BILIRUBIN mg/dL  --  0.3 0.4 0.4   ALK PHOS U/L  --  66 81 74   ALT (SGPT) U/L  --  23 31 17   AST (SGOT) U/L  --  19 27 22   GLUCOSE mg/dL 133* 133* 243* 323*       Estimated Creatinine Clearance: 17.5 mL/min (A) (by C-G formula based on SCr of 3.83 mg/dL (H)).    Results from last 7 days   Lab Units 11/30/23  0040 11/28/23 2119 11/24/23  1836   MAGNESIUM mg/dL 2.0 2.0  --    PHOSPHORUS mg/dL 3.0  --  3.2             Results from last 7 days   Lab Units 11/30/23  0226 11/30/23  0040 11/29/23  1622 11/29/23  0756 11/29/23  0338 11/28/23 2119 11/24/23  1836   WBC 10*3/mm3  --  7.40  --  5.51 5.39 6.63 10.01   HEMOGLOBIN g/dL 9.3* 9.3*  9.3* 11.2* 10.0*  10.0* 10.1* 11.3* 10.7*   PLATELETS 10*3/mm3  --  235  --  220 176 240 152       Results from last 7 days   Lab Units 11/28/23 2119 11/23/23  1446   INR  1.18* 1.21*       Assessment / Plan     ASSESSMENT:  End-stage renal disease and diabetic nephropathy on maintenance hemodialysis every Tuesday, Thursday and Saturday,He appears to be euvolemic, potassium is 3.5 we will switch to 4K bath currently on dialysis tolerating the treatment reasonably well  Hypokalemia associated with metabolic and respiratory alkalosis with alkaline  Atrial fibrillation with rapid ventricular response better with diltiazem drip  Diabetes mellitus type 2 with renal complication  Hypertension with chronic kidney disease, well controlled  Anemia of chronic kidney disease, hemoglobin today is 9.3 treated with long-acting HAI as an outpatient  Altered mental status.   Improved  History of coronary artery disease and prior CVA.       PLAN:  Continue the same treatment  Surveillance labs    I reviewed the chart and other providers notes, I reviewed labs  I discussed the case with the dialysis nurse and with Dr. Amaya  [employer] text in this note has been reviewed and is accurate as of 11/30/23.       Thank you for involving us in the care of Carlito Mo.  Please feel free to call with any questions.    Cameron Olguin MD  11/30/23  08:32 [facility]    [facility]  [phone]    Please note that portions of this note were completed with a voice recognition program.

## 2023-11-30 NOTE — PROGRESS NOTES
Dr. MEGHAN Amaya    [facility] INTENSIVE CARE        Patient ID:  Name:  Carlito Mo  MRN:  [id number]  1955  68 y.o.  male            CC/Reason for visit: Nausea vomiting of unclear etiology     Interval hx: Patient has no new complaints today.  GI not planning on upper endoscopy until patient is more stable, cleared from cardiology standpoint.  Receiving hemodialysis today, plan is to remove 1 L     ROS: Denies chest pain or abdominal pain but still having some nausea    Vitals:  Vitals:    11/30/23 1130 11/30/23 1153 11/30/23 1200 11/30/23 1230   BP:       BP Location:       Patient Position:       Pulse: 86  93 96   Resp:       Temp:  98.1 °F (36.7 °C)     TempSrc:  Oral     SpO2: 96%  96% 97%   Weight:       Height:               Body mass index is 23.84 kg/m².    Intake/Output Summary (Last 24 hours) at 11/30/2023 1312  Last data filed at 11/30/2023 0500  Gross per 24 hour   Intake 465 ml   Output 100 ml   Net 365 ml       Exam:  GEN:  No distress  Alert, oriented x 3.   LUNGS: Clear breath sounds bilat, no use of accessory muscles  CV:  Normal S1S2, without murmur, no edema  ABD:  Non tender, no enlarged liver or masses      Scheduled meds:  budesonide-formoterol, 2 puff, Inhalation, BID - RT  insulin regular, 2-7 Units, Subcutaneous, Q6H  levothyroxine, 75 mcg, Oral, Q AM  pantoprazole, 80 mg, Intravenous, Once  sodium bicarbonate, 650 mg, Oral, TID  sodium chloride, 10 mL, Intravenous, Q12H  sodium chloride, 10 mL, Intravenous, Q12H      IV meds:                      dilTIAZem, 5-15 mg/hr, Last Rate: 5 mg/hr (11/30/23 1305)  pantoprazole, 40 mg, Last Rate: 40 mg (11/30/23 1306)        Data Review:   I reviewed the patient's medications and new clinical results.            Results from last 7 days   Lab Units 11/30/23  0226 11/30/23  0040 11/29/23  1622 11/29/23  0756 11/29/23  0338 11/28/23  2119 11/24/23  1836 11/23/23  1446   SODIUM mmol/L  --  137  --  138  --  140 133* 138   POTASSIUM  mmol/L  --  3.5  --  3.2*  --  4.0 3.7 3.9   CHLORIDE mmol/L  --  99  --  101  --  97* 98 99   CO2 mmol/L  --  24.3  --  26.3  --  26.3 22.9 25.0   BUN mg/dL  --  44*  --  36*  --  28* 44* 39*   CREATININE mg/dL  --  3.83*  --  3.24*  --  2.74* 3.70* 3.84*   CALCIUM mg/dL  --  8.6  --  8.8  --  9.5 9.1 9.7   BILIRUBIN mg/dL  --   --   --  0.3  --  0.4 0.4 0.6   ALK PHOS U/L  --   --   --  66  --  81 74 83   ALT (SGPT) U/L  --   --   --  23  --  31 17 15   AST (SGOT) U/L  --   --   --  19  --  27 22 17   GLUCOSE mg/dL  --  133*  --  133*  --  243* 323* 178*   WBC 10*3/mm3  --  7.40  --  5.51 5.39 6.63 10.01 9.58   HEMOGLOBIN g/dL 9.3* 9.3*  9.3* 11.2* 10.0*  10.0* 10.1* 11.3* 10.7* 11.5*   PLATELETS 10*3/mm3  --  235  --  220 176 240 152 159   INR   --   --   --   --   --  1.18*  --  1.21*   PROBNP pg/mL  --   --   --   --   --  59,481.0*  --   --              Results from last 7 days   Lab Units 11/30/23  0226 11/30/23  0040 11/29/23  0756   HSTROP T ng/L 170* 173* 155*     Results from last 7 days   Lab Units 11/28/23  2223   PH, ARTERIAL pH units 7.555*   PCO2, ARTERIAL mm Hg 29.6*   PO2 ART mm Hg 83.5   O2 SATURATION ART % 97.7   MODALITY  Room Air             ASSESSMENT:   Elevated troponin  Hypotensive  Atrial fibrillation with rapid ventricular response  Nausea vomiting of unclear etiology  Hypotension, likely cardiogenic due to A-fib  Probable upper GI bleed  End-stage renal disease  Diabetes mellitus type 2  COPD  Hypothyroidism  Failure to thrive      PLAN:  Discussed with Dr. Corona from cardiology.  He does not suspect acute coronary syndrome.  Agree with him that patient may be actually dry.  Nephrology has removed 1 L today with hemodialysis but we will have to give maximum fluid.  He is now borderline hypotensive.  Elevated troponin likely due to chronic renal failure and recent stress from nausea vomiting.  Has not had any bloody emesis or bloody stool since admission.  Discussed with   Sharif, continue with regularly scheduled hemodialysis.  GI to perform upper scope anytime from cardiology standpoint.  Patient is stable from a respiratory standpoint.  We will send him to telemetry floor.  He is on room air.  A-fib is controlled  Request hospitalist service to take over care      Bernardo Amaya MD  11/30/2023

## 2023-11-30 NOTE — PLAN OF CARE
"Goal Outcome Evaluation:      Pt afebrile/denies any nausea or discomfort/states \"feel tired\".  No bloody emesis, no bloody stools.  Blood sugars have not required any coverage at this time.  Pt resting and denies any dyspnea with O2 sats 96-97% on room air.  Heart rhythm controlled afib with rate 70-80s.                    "

## 2023-11-30 NOTE — PROGRESS NOTES
Discharge Planning Assessment  [facility]     Patient Name: Carlito Mo  MRN: [id number]  Today's Date: 11/30/2023    Admit Date: 11/28/2023    Plan: New referral to [facility] skilled rehab with HD and long possible long term care.   Discharge Needs Assessment       Row Name 11/30/23 1458       Transition Planning    Patient/Family Anticipates Transition to other (see comments)    Patient/Family Anticipated Services at Transition skilled nursing    Transportation Anticipated health plan transportation       Discharge Needs Assessment    Current Outpatient/Agency/Support Group long-term acute care facility    Concerns to be Addressed discharge planning    Anticipated Changes Related to Illness inability to care for self    Discharge Facility/Level of Care Needs nursing facility, intermediate    Provided Post Acute Provider List? Yes    Post Acute Provider List Nursing Home    Delivered To Support Person    Method of Delivery In person    Current Discharge Risk chronically ill      Row Name 11/30/23 1444       Living Environment    People in Home spouse    Name(s) of People in Home ijeoma Mo [phone]    Current Living Arrangements home    Primary Care Provided by spouse/significant other    Family Caregiver if Needed spouse    Family Caregiver Names Spouse Lissette Mo [phone]                   Discharge Plan       Row Name 11/30/23 8562       Plan    Plan New referral to [facility] skilled rehab with HD and long possible long term care.    Plan Comments Call placed to spouse Sofiya Mo [phone] to discuss discharge planning, spouse stated he is not able to care for his self at home and will need skilled rehab with hemodialysis and  possible long term care and will be long term care Medicaid pending. Spouse stated he uses a cane walker as needed, he does have 9 steps up to his bedroom that he is having trouble navigating the steps. He also uses home oxygen and a Cpap. Spouse stated he is  non complaint with his home oxygen and Cpap. He also does not check his blood sugar or take his insulin correctly.  Spouse stated he has been to [facility] in past and would like him to return. New referral to [facility] in epic. Norah Wei RN                  Continued Care and Services - Admitted Since 11/28/2023       Destination       Service Provider Request Status Selected Services Address Phone Fax Patient Preferred    [facility] Pending - Request Sent N/A [id number] [id number] [phone] --                     Demographic Summary       Row Name 11/30/23 1443       General Information    Admission Type inpatient    Arrived From emergency department    Referral Source admission list    Reason for Consult discharge planning    Preferred Language English                   Functional Status       Row Name 11/30/23 1443       Functional Status    Usual Activity Tolerance moderate       Functional Status, IADL    Medications independent    Meal Preparation completely dependent    Housekeeping completely dependent    Laundry completely dependent    Shopping completely dependent                   Psychosocial    No documentation.                  Abuse/Neglect    No documentation.                  Legal    No documentation.                  Substance Abuse    No documentation.                  Patient Forms    No documentation.                     Norah Wei RN

## 2023-11-30 NOTE — CONSULTS
Internal Medicine Consult  INTERNAL MEDICINE   [facility]       Patient Identification:  Name: Carlito Mo  Age: 68 y.o.  Sex: male  :  1955  MRN: [id number]                   Primary Care Physician: Florencia Caballero MD                               Requesting physician: Dr. Amaya  Date of consultation: 2023    Reason for consultation: Continued medical care out of ICU for end-stage renal disease and chronic nausea and vomiting.    History of Present Illness:   Patient is a 68-year-old male who has complicated past medical history including hypothyroidism, congestive heart failure, coronary artery disease, COPD as well as history of end-stage renal disease on hemodialysis with a schedule of  and Saturday with last dialysis on 2023 presented to the hospital with persistent nausea and vomiting that is occurring on a recurrent basis.  He has a prior visit to the emergency room for similar symptoms.  In the emergency room patient was noted to be confused and also noted to have atrial fibrillation with rapid ventricular response for which she was started on Cardizem drip.  Because of his mental status hemodynamics and heart rate patient was admitted to ICU and was evaluated and followed by gastroenterology, cardiology and nephrology service.  Eventual assessment of viral gastroenteritis was made and supportive care was recommended.  The patient did have hematemesis which was thought to be due to small Vanessa-Casarez tear for which anticoagulation therapy was recommended to be held and patient is continued on IV Protonix.  Serial H&H is being followed and patient hemoglobin has not stabilized at 9.3 from baseline of 10.2.  Patient today is considered stable and improved and transferred out of ICU and internal medicine service is consulted for ongoing care during hospitalization out of ICU.  Patient at present denies any new complaints.  And feels  comfortable.      Past Medical History:  Past Medical History:   Diagnosis Date    Acquired hypothyroidism     Acute congestive heart failure     Acute respiratory failure with hypoxia     Acute sinusitis     SYLVAIN (acute kidney injury)     on CKD    Anemia     Arthritis     CAD (coronary artery disease)     Cancer     skin    Chronic combined systolic and diastolic congestive heart failure     Chronic ischemic heart disease     Chronic kidney disease (CKD)     CKD (chronic kidney disease)     COPD (chronic obstructive pulmonary disease)     Depression     Diabetes mellitus type 2, uncontrolled, without complications     Diabetic neuropathy     Disease of thyroid gland     Elevated cholesterol     Fatigue     Frequent PVCs     Generalized weakness     GERD (gastroesophageal reflux disease)     Heart attack     History of coronary artery disease     with remote history of bypass surgery in 2001    Hyperlipidemia     Hypertension     Hypertensive encephalopathy     Mental status change     Acute    NO DEVICE/RECALLED     Pleural effusion     Pneumonia     Poor historian     RBBB (right bundle branch block)     Stroke     POOR VISION    TIA (transient ischemic attack)     Type 2 diabetes mellitus     Urinary retention     Vitamin D deficiency      Past Surgical History:  Past Surgical History:   Procedure Laterality Date    APPENDECTOMY N/A 02/14/2016    Dr. Alexey Dodson    ARTERIOVENOUS FISTULA/SHUNT SURGERY Right 06/03/2022    Procedure: RIGHT FOREARM ARTERIOVENOUS FISTULA PLACEMENT RADIOCEPHALIC WITH CEPHALIC VEIN TRANSPOSITION;  Surgeon: Eliseo Willis MD;  Location: [facility] OR;  Service: Vascular;  Laterality: Right;    COLONOSCOPY      over 20 years ago.  no polyps     COLONOSCOPY N/A 09/18/2018    Procedure: COLONOSCOPY;  Surgeon: Jose Enrique Louie MD;  Location: [facility] ENDOSCOPY;  Service: Gastroenterology    COLONOSCOPY N/A 09/19/2018    IH, EH, polyps (TAs w/low grade dysplasia)    COLONOSCOPY N/A  06/01/2020    Procedure: COLONOSCOPY;  Surgeon: Jose Enrique Louie MD;  Location:  [facility];  Service: Gastroenterology;  Laterality: N/A;  Pre op-Anemia, Melena, History of Polyps  Post op-To Cecum/TI, Polyp, Poor Prep    CORONARY ARTERY BYPASS GRAFT  11/2001    ENDOSCOPY N/A 05/29/2020    Procedure: ESOPHAGOGASTRODUODENOSCOPY with biopsies;  Surgeon: Amanda Lovelace MD;  Location:  [facility];  Service: Gastroenterology;  Laterality: N/A;  pre-anemia, dark stools  post-esophagitis, hiatal hernia    ENDOSCOPY N/A 09/15/2020    Procedure: ESOPHAGOGASTRODUODENOSCOPY WITH BIOPSIES;  Surgeon: Jose Enrique Louie MD;  Location:  [facility];  Service: Gastroenterology;  Laterality: N/A;  pre: history of melena and esophagitis  post: mild esophagitis and gastritis, small hiatal hernia    HERNIA REPAIR Left 12/05/2019    THORACENTESIS      TONSILLECTOMY      VASECTOMY        Home Meds:  Medications Prior to Admission   Medication Sig Dispense Refill Last Dose    acetaminophen (TYLENOL) 325 MG tablet Take 2 tablets by mouth Every 4 (Four) Hours As Needed for Mild Pain.       amLODIPine (NORVASC) 5 MG tablet Take 1 tablet by mouth Daily.       apixaban (ELIQUIS) 5 MG tablet tablet Take 1 tablet by mouth Every 12 (Twelve) Hours. Indications: Atrial Fibrillation 60 tablet 5     aspirin 81 MG EC tablet Take 1 tablet by mouth Daily. 90 tablet 3     budesonide-formoterol (SYMBICORT) 160-4.5 MCG/ACT inhaler Inhale 2 puffs 2 (Two) Times a Day. 1 each 3     buPROPion XL (WELLBUTRIN XL) 150 MG 24 hr tablet Take 1 tablet by mouth Daily.       carvedilol (COREG) 6.25 MG tablet Take 1 tablet by mouth 2 (Two) Times a Day With Meals. 60 tablet 3     famotidine (PEPCID) 10 MG tablet Take 1 tablet by mouth 2 (Two) Times a Day As Needed for Heartburn.       ferrous sulfate 325 (65 FE) MG tablet Take 1 tablet by mouth Daily With Breakfast. 30 tablet 3     hydrALAZINE (APRESOLINE) 25 MG tablet Take 1 tablet by mouth 3  (Three) Times a Day.       Insulin Glargine w/ Trans Port (Basaglar Tempo Pen) 100 UNIT/ML solution pen-injector Inject 2 Units under the skin into the appropriate area as directed Every Night.       ipratropium-albuterol (COMBIVENT RESPIMAT)  MCG/ACT inhaler Inhale 1 puff 4 (Four) Times a Day As Needed for Wheezing.       levothyroxine (SYNTHROID, LEVOTHROID) 75 MCG tablet Take 1 tablet by mouth Daily. 30 tablet 5     ondansetron ODT (ZOFRAN-ODT) 4 MG disintegrating tablet Place 1 tablet on the tongue Every 8 (Eight) Hours As Needed for Nausea or Vomiting. 12 tablet 0     rosuvastatin (CRESTOR) 40 MG tablet Take 1 tablet by mouth Daily.       sodium bicarbonate 650 MG tablet Take 1 tablet by mouth 3 (Three) Times a Day. 90 tablet 3     tamsulosin (FLOMAX) 0.4 MG capsule 24 hr capsule Take 1 capsule by mouth Daily.       torsemide (DEMADEX) 100 MG tablet TAKE ONE TABLET BY MOUTH DAILY 90 tablet 2      Current Meds:     Current Facility-Administered Medications:     acetaminophen (TYLENOL) tablet 650 mg, 650 mg, Oral, Q4H PRN **OR** acetaminophen (TYLENOL) 160 MG/5ML oral solution 650 mg, 650 mg, Oral, Q4H PRN **OR** acetaminophen (TYLENOL) suppository 650 mg, 650 mg, Rectal, Q4H PRN, Amber Agudelo APRN    acetaminophen (TYLENOL) tablet 650 mg, 650 mg, Oral, Q4H PRN **OR** acetaminophen (TYLENOL) suppository 650 mg, 650 mg, Rectal, Q4H PRN, Erich García MD    budesonide-formoterol (SYMBICORT) 160-4.5 MCG/ACT inhaler 2 puff, 2 puff, Inhalation, BID - RT, Erich García MD, 2 puff at 11/30/23 0823    calcium carbonate (TUMS) chewable tablet 500 mg (200 mg elemental), 2 tablet, Oral, BID PRN, Amber Agudelo APRN    dextrose (D50W) (25 g/50 mL) IV injection 25 g, 25 g, Intravenous, Q15 Min PRN, Amber Agudelo APRN    dextrose (D50W) (25 g/50 mL) IV injection 25 g, 25 g, Intravenous, Q15 Min PRN, Erich García MD    dextrose (GLUTOSE) oral gel 15 g, 15 g, Oral, Q15 Min PRN, Raquel,  Erich BASURTO MD    dilTIAZem (CARDIZEM) 100 mg in 100 mL NS infusion (ADV), 5-15 mg/hr, Intravenous, Titrated, Mauricio Corona MD, Last Rate: 5 mL/hr at 11/30/23 1305, 5 mg/hr at 11/30/23 1305    glucagon (GLUCAGEN) injection 1 mg, 1 mg, Intramuscular, Q15 Min PRN, Erich García MD    insulin regular (humuLIN R,novoLIN R) injection 2-7 Units, 2-7 Units, Subcutaneous, Q6H, Erich García MD, 4 Units at 11/29/23 1751    ipratropium-albuterol (DUO-NEB) nebulizer solution 3 mL, 3 mL, Nebulization, Q6H PRN, Erich García MD    levothyroxine (SYNTHROID, LEVOTHROID) tablet 75 mcg, 75 mcg, Oral, Q AM, Erich García MD, 75 mcg at 11/30/23 0538    mannitol 25% (RENAL) injection, 12.5 g, Intravenous, PRN, Cameron Olguin MD    nitroglycerin (NITROSTAT) SL tablet 0.4 mg, 0.4 mg, Sublingual, Q5 Min PRN, Erich García MD    ondansetron (ZOFRAN) tablet 4 mg, 4 mg, Oral, Q6H PRN **OR** ondansetron (ZOFRAN) injection 4 mg, 4 mg, Intravenous, Q6H PRN, Erich García MD    pantoprazole (PROTONIX) 40 mg in 100 mL NS (VTB), 40 mg, Intravenous, Continuous, Erich García MD, Last Rate: 20 mL/hr at 11/30/23 1306, 40 mg at 11/30/23 1306    pantoprazole (PROTONIX) injection 80 mg, 80 mg, Intravenous, Once, Damian Cook MD    sodium bicarbonate tablet 650 mg, 650 mg, Oral, TID, Erich García MD, 650 mg at 11/30/23 1649    sodium chloride 0.9 % bolus 1,000 mL, 1,000 mL, Intravenous, PRN, Cameron Olguin MD    sodium chloride 0.9 % bolus 500 mL, 500 mL, Intravenous, Once, Bernardo Amaya MD    sodium chloride 0.9 % flush 10 mL, 10 mL, Intravenous, PRN, Damian Cook MD    Insert Peripheral IV, , , Once **AND** sodium chloride 0.9 % flush 10 mL, 10 mL, Intravenous, PRN, Damian Cook MD    sodium chloride 0.9 % flush 10 mL, 10 mL, Intravenous, Q12H, Amber Agudelo APRN, 10 mL at 11/30/23 0824    sodium chloride 0.9 % flush 10 mL, 10 mL, Intravenous, PRN, Amber Agudelo APRN    sodium  "chloride 0.9 % flush 10 mL, 10 mL, Intravenous, Q12H, Erich García MD, 10 mL at 23 0824    sodium chloride 0.9 % flush 10 mL, 10 mL, Intravenous, PRN, Erich García MD    sodium chloride 0.9 % infusion 40 mL, 40 mL, Intravenous, PRN, Amber Agudelo APRN    sodium chloride 0.9 % infusion 40 mL, 40 mL, Intravenous, PRN, Erich García MD  Allergies:  Allergies   Allergen Reactions    Prozac [Fluoxetine Hcl] Other (See Comments)     shaking     Social History:   Social History     Tobacco Use    Smoking status: Never    Smokeless tobacco: Never    Tobacco comments:     CAFFEINE - OCCAS. SODA   Substance Use Topics    Alcohol use: No     Comment: last drink 2 months ago      Family History:  Family History   Problem Relation Age of Onset    Diabetes Mother     Hypertension Mother     Macular degeneration Mother     Alcohol abuse Father     Cancer Father         lung,  age 65    Heart disease Father     Alcohol abuse Brother     Cirrhosis Brother          age 50    Liver cancer Brother 45    Diabetes Son     Kidney failure Son     Autism Son     Malig Hyperthermia Neg Hx           Review of Systems  See history of present illness and past medical history.      Vitals:   /70   Pulse 78   Temp 97.7 °F (36.5 °C) (Oral)   Resp 18   Ht 167.6 cm (66\")   Wt 67 kg (147 lb 11.3 oz)   SpO2 96%   BMI 23.84 kg/m²   I/O:   Intake/Output Summary (Last 24 hours) at 2023 1724  Last data filed at 2023 0500  Gross per 24 hour   Intake 465 ml   Output --   Net 465 ml     Exam:  Patient is examined using the personal protective equipment as per guidelines from infection control for this particular patient as enacted.  Hand washing was performed before and after patient interaction.  General Appearance:  Comfortable nontoxic-appearing male who feels better.  Denies any more hematemesis or nausea or vomiting at this time.   Head:    Normocephalic, without obvious abnormality, " atraumatic   Eyes:    PERRL, conjunctiva/corneas clear, EOM's intact, both eyes   Ears:    Normal external ear canals, both ears   Nose:   Nares normal, septum midline, mucosa normal, no drainage    or sinus tenderness   Throat:   Lips, tongue, gums normal; oral mucosa pink and moist   Neck:   Supple, symmetrical, trachea midline, no adenopathy;     thyroid:  no enlargement/tenderness/nodules; no carotid    bruit or JVD   Back:     Symmetric, no curvature, ROM normal, no CVA tenderness   Lungs:     Clear to auscultation bilaterally, respirations unlabored   Chest Wall:    No tenderness or deformity    Heart:  S1-S2 regular   Abdomen:   Soft nontender   Extremities: Functional right arm AV fistula.   Pulses:   Pulses palpable in all extremities; symmetric all extremities   Skin: Ecchymotic changes noted but no rash.   Neurologic: Awake interactive and grossly nonfocal examination       Data Review:      I reviewed the patient's new clinical results.  Results from last 7 days   Lab Units 11/30/23  1545 11/30/23  0226 11/30/23  0040 11/29/23  1622 11/29/23  0756 11/29/23  0338 11/28/23 2119 11/24/23  1836   WBC 10*3/mm3  --   --  7.40  --  5.51 5.39 6.63 10.01   HEMOGLOBIN g/dL 9.3* 9.3* 9.3*  9.3* 11.2* 10.0*  10.0* 10.1* 11.3* 10.7*   PLATELETS 10*3/mm3  --   --  235  --  220 176 240 152     Results from last 7 days   Lab Units 11/30/23  0040 11/29/23  0756 11/28/23 2119 11/24/23  1836   SODIUM mmol/L 137 138 140 133*   POTASSIUM mmol/L 3.5 3.2* 4.0 3.7   CHLORIDE mmol/L 99 101 97* 98   CO2 mmol/L 24.3 26.3 26.3 22.9   BUN mg/dL 44* 36* 28* 44*   CREATININE mg/dL 3.83* 3.24* 2.74* 3.70*   CALCIUM mg/dL 8.6 8.8 9.5 9.1   GLUCOSE mg/dL 133* 133* 243* 323*     XR Chest 1 View    Result Date: 11/28/2023  No acute findings.  This report was finalized on 11/28/2023 9:51 PM by Dr. Alice Allen M.D on Workstation: [id number]      CT Abdomen Pelvis Without Contrast    Result Date: 11/24/2023   1. No acute  intra-abdominal or intrapelvic process seen. 2. Patchy infiltrates noted at the lung bases bilaterally. Correlation with any evidence of pneumonia is recommended.  Radiation dose reduction techniques were utilized, including automated exposure control and exposure modulation based on body size.   This report was finalized on 11/24/2023 9:45 PM by Dr. Alice Allen M.D on Workstation: [email or web address]      XR Chest 1 View    Result Date: 11/23/2023  No interval change or evidence for active disease in the chest.  This report was finalized on 11/23/2023 4:10 PM by Dr. Keon Santoyo M.D on Workstation: [id number]     ECG 12 Lead Rhythm Change   Final Result   HEART RATE= 84  bpm   RR Interval= 714  ms   MO Interval=   ms   P Horizontal Axis=   deg   P Front Axis=   deg   QRSD Interval= 152  ms   QT Interval= 428  ms   QTcB= 507  ms   QRS Axis= -161  deg   T Wave Axis= 63  deg   - ABNORMAL ECG -   Atrial fibrillation   Right bundle branch block   Anteroseptal infarct, age indeterminate   Rate has improved   Electronically Signed By: Jaqueline James ([facility]) 30-Nov-2023 17:29:17   Date and Time of Study: 2023-11-30 03:04:06      ECG 12 Lead Tachycardia   Final Result   HEART RATE= 173  bpm   RR Interval= 347  ms   MO Interval=   ms   P Horizontal Axis=   deg   P Front Axis=   deg   QRSD Interval= 136  ms   QT Interval= 299  ms   QTcB= 508  ms   QRS Axis= -86  deg   T Wave Axis= 60  deg   - ABNORMAL ECG -   Atrial fibrillation   Ventricular premature complex   Right bundle branch block   Left anterior fascicular block   When compared with ECG of 23-Nov-2023 15:26:33,   Significant rate increase otherwise no change   Electronically Signed By: [employer]upo Hurst ([facility]) 29-Nov-2023 15:31:39   Date and Time of Study: 2023-11-28 21:23:00      SCANNED - TELEMETRY     Final Result        Microbiology Results (last 10 days)       ** No results found for the last 240 hours. **            Assessment:  Active Hospital Problems     Diagnosis  POA    **Atrial fibrillation with rapid ventricular response [I48.91]  Yes   End-stage renal disease on hemodialysis  Coffee-ground emesis with a stable hemoglobin  Chronic congestive heart failure  COPD  Diabetes mellitus  Possible viral gastroenteritis  Stable anemia    Plan:  Continue present care with serial H&H and with holding his anticoagulation therapy and restart once it is considered safe from GI standpoint.  Continue with Accu-Cheks and sliding scale coverage  Ongoing management of end-stage renal disease per nephrology service.  Continue with oxygen supplementation and as needed nebulizer treatment.  [facility] will take over the care.    Tamara Awad MD   11/30/2023  17:24 EST    Parts of this note may be an electronic transcription/translation of spoken language to printed text using the Dragon dictation system.

## 2023-12-01 PROBLEM — R04.2 HEMOPTYSIS: Status: ACTIVE | Noted: 2023-12-01

## 2023-12-01 PROBLEM — R11.2 NAUSEA & VOMITING: Status: ACTIVE | Noted: 2023-12-01

## 2023-12-01 PROBLEM — N18.6 ESRD (END STAGE RENAL DISEASE): Status: ACTIVE | Noted: 2023-12-01

## 2023-12-01 LAB
ALBUMIN SERPL-MCNC: 3 G/DL (ref 3.5–5.2)
ANION GAP SERPL CALCULATED.3IONS-SCNC: 8 MMOL/L (ref 5–15)
BASOPHILS # BLD AUTO: 0.01 10*3/MM3 (ref 0–0.2)
BASOPHILS NFR BLD AUTO: 0.2 % (ref 0–1.5)
BUN SERPL-MCNC: 27 MG/DL (ref 8–23)
BUN/CREAT SERPL: 9.2 (ref 7–25)
CALCIUM SPEC-SCNC: 8.3 MG/DL (ref 8.6–10.5)
CHLORIDE SERPL-SCNC: 104 MMOL/L (ref 98–107)
CO2 SERPL-SCNC: 22 MMOL/L (ref 22–29)
CREAT SERPL-MCNC: 2.92 MG/DL (ref 0.76–1.27)
DEPRECATED RDW RBC AUTO: 40.5 FL (ref 37–54)
EGFRCR SERPLBLD CKD-EPI 2021: 22.7 ML/MIN/1.73
EOSINOPHIL # BLD AUTO: 0.19 10*3/MM3 (ref 0–0.4)
EOSINOPHIL NFR BLD AUTO: 2.9 % (ref 0.3–6.2)
ERYTHROCYTE [DISTWIDTH] IN BLOOD BY AUTOMATED COUNT: 13 % (ref 12.3–15.4)
GLUCOSE BLDC GLUCOMTR-MCNC: 117 MG/DL (ref 70–130)
GLUCOSE BLDC GLUCOMTR-MCNC: 127 MG/DL (ref 70–130)
GLUCOSE BLDC GLUCOMTR-MCNC: 180 MG/DL (ref 70–130)
GLUCOSE BLDC GLUCOMTR-MCNC: 312 MG/DL (ref 70–130)
GLUCOSE SERPL-MCNC: 173 MG/DL (ref 65–99)
HCT VFR BLD AUTO: 26.3 % (ref 37.5–51)
HCT VFR BLD AUTO: 28.3 % (ref 37.5–51)
HCT VFR BLD AUTO: 28.4 % (ref 37.5–51)
HGB BLD-MCNC: 8.4 G/DL (ref 13–17.7)
HGB BLD-MCNC: 8.6 G/DL (ref 13–17.7)
HGB BLD-MCNC: 8.9 G/DL (ref 13–17.7)
IMM GRANULOCYTES # BLD AUTO: 0.22 10*3/MM3 (ref 0–0.05)
IMM GRANULOCYTES NFR BLD AUTO: 3.3 % (ref 0–0.5)
LYMPHOCYTES # BLD AUTO: 0.95 10*3/MM3 (ref 0.7–3.1)
LYMPHOCYTES NFR BLD AUTO: 14.4 % (ref 19.6–45.3)
MCH RBC QN AUTO: 26.4 PG (ref 26.6–33)
MCHC RBC AUTO-ENTMCNC: 30.4 G/DL (ref 31.5–35.7)
MCV RBC AUTO: 86.8 FL (ref 79–97)
MONOCYTES # BLD AUTO: 0.74 10*3/MM3 (ref 0.1–0.9)
MONOCYTES NFR BLD AUTO: 11.2 % (ref 5–12)
NEUTROPHILS NFR BLD AUTO: 4.51 10*3/MM3 (ref 1.7–7)
NEUTROPHILS NFR BLD AUTO: 68 % (ref 42.7–76)
NRBC BLD AUTO-RTO: 0 /100 WBC (ref 0–0.2)
PHOSPHATE SERPL-MCNC: 1.5 MG/DL (ref 2.5–4.5)
PLATELET # BLD AUTO: 260 10*3/MM3 (ref 140–450)
PMV BLD AUTO: 10.9 FL (ref 6–12)
POTASSIUM SERPL-SCNC: 4.5 MMOL/L (ref 3.5–5.2)
RBC # BLD AUTO: 3.26 10*6/MM3 (ref 4.14–5.8)
SODIUM SERPL-SCNC: 134 MMOL/L (ref 136–145)
WBC NRBC COR # BLD AUTO: 6.62 10*3/MM3 (ref 3.4–10.8)

## 2023-12-01 PROCEDURE — 99232 SBSQ HOSP IP/OBS MODERATE 35: CPT | Performed by: INTERNAL MEDICINE

## 2023-12-01 PROCEDURE — 85018 HEMOGLOBIN: CPT | Performed by: INTERNAL MEDICINE

## 2023-12-01 PROCEDURE — 99232 SBSQ HOSP IP/OBS MODERATE 35: CPT | Performed by: NURSE PRACTITIONER

## 2023-12-01 PROCEDURE — 97162 PT EVAL MOD COMPLEX 30 MIN: CPT

## 2023-12-01 PROCEDURE — 85014 HEMATOCRIT: CPT | Performed by: INTERNAL MEDICINE

## 2023-12-01 PROCEDURE — 94761 N-INVAS EAR/PLS OXIMETRY MLT: CPT

## 2023-12-01 PROCEDURE — 94799 UNLISTED PULMONARY SVC/PX: CPT

## 2023-12-01 PROCEDURE — 80069 RENAL FUNCTION PANEL: CPT | Performed by: INTERNAL MEDICINE

## 2023-12-01 PROCEDURE — 85025 COMPLETE CBC W/AUTO DIFF WBC: CPT | Performed by: INTERNAL MEDICINE

## 2023-12-01 PROCEDURE — 63710000001 INSULIN REGULAR HUMAN PER 5 UNITS: Performed by: INTERNAL MEDICINE

## 2023-12-01 PROCEDURE — 94664 DEMO&/EVAL PT USE INHALER: CPT

## 2023-12-01 PROCEDURE — 82948 REAGENT STRIP/BLOOD GLUCOSE: CPT

## 2023-12-01 RX ORDER — PANTOPRAZOLE SODIUM 40 MG/10ML
40 INJECTION, POWDER, LYOPHILIZED, FOR SOLUTION INTRAVENOUS 2 TIMES DAILY
Status: DISCONTINUED | OUTPATIENT
Start: 2023-12-01 | End: 2023-12-05

## 2023-12-01 RX ORDER — MANNITOL 250 MG/ML
12.5 INJECTION, SOLUTION INTRAVENOUS AS NEEDED
Status: CANCELLED | OUTPATIENT
Start: 2023-12-02 | End: 2023-12-02

## 2023-12-01 RX ORDER — CARVEDILOL 3.12 MG/1
3.12 TABLET ORAL 2 TIMES DAILY WITH MEALS
Status: DISCONTINUED | OUTPATIENT
Start: 2023-12-01 | End: 2023-12-02

## 2023-12-01 RX ADMIN — PANTOPRAZOLE SODIUM 40 MG: 40 INJECTION, POWDER, FOR SOLUTION INTRAVENOUS at 21:09

## 2023-12-01 RX ADMIN — INSULIN HUMAN 2 UNITS: 100 INJECTION, SOLUTION PARENTERAL at 12:05

## 2023-12-01 RX ADMIN — Medication 10 ML: at 08:29

## 2023-12-01 RX ADMIN — BUDESONIDE AND FORMOTEROL FUMARATE DIHYDRATE 2 PUFF: 160; 4.5 AEROSOL RESPIRATORY (INHALATION) at 10:20

## 2023-12-01 RX ADMIN — SODIUM BICARBONATE 650 MG: 650 TABLET, ORALLY DISINTEGRATING ORAL at 21:09

## 2023-12-01 RX ADMIN — SODIUM CHLORIDE 5 MG/HR: 900 INJECTION, SOLUTION INTRAVENOUS at 03:58

## 2023-12-01 RX ADMIN — CARVEDILOL 3.12 MG: 3.12 TABLET, FILM COATED ORAL at 14:08

## 2023-12-01 RX ADMIN — SODIUM PHOSPHATE, MONOBASIC, MONOHYDRATE AND SODIUM PHOSPHATE, DIBASIC, ANHYDROUS 10 MMOL: 142; 276 INJECTION, SOLUTION INTRAVENOUS at 14:02

## 2023-12-01 RX ADMIN — BUDESONIDE AND FORMOTEROL FUMARATE DIHYDRATE 2 PUFF: 160; 4.5 AEROSOL RESPIRATORY (INHALATION) at 19:58

## 2023-12-01 RX ADMIN — SODIUM CHLORIDE 40 MG: 9 INJECTION, SOLUTION INTRAVENOUS at 03:58

## 2023-12-01 RX ADMIN — LEVOTHYROXINE SODIUM 75 MCG: 75 TABLET ORAL at 06:32

## 2023-12-01 RX ADMIN — Medication 10 ML: at 21:09

## 2023-12-01 RX ADMIN — SODIUM BICARBONATE 650 MG: 650 TABLET, ORALLY DISINTEGRATING ORAL at 08:29

## 2023-12-01 RX ADMIN — SODIUM BICARBONATE 650 MG: 650 TABLET, ORALLY DISINTEGRATING ORAL at 16:16

## 2023-12-01 RX ADMIN — SODIUM CHLORIDE 40 MG: 9 INJECTION, SOLUTION INTRAVENOUS at 09:36

## 2023-12-01 NOTE — PROGRESS NOTES
[facility] Progress Note      Patient Name: Carlito Mo  : 1955  MRN: [id number]  Primary Care Physician:  Florencia Caballero MD  Date of admission: 2023    Subjective     Interval History:   Follow-up end-stage renal disease    Patient is feeling much better today he is transferred out of the ICU, remains on diltiazem drip his heart rate is much better controlled, he had dialysis yesterday without any difficulties, denies any chest pain or shortness of air, no nausea or vomiting.    Review of Systems:   As noted above    Objective     Vitals:   Temp:  [97.5 °F (36.4 °C)-99 °F (37.2 °C)] 97.5 °F (36.4 °C)  Heart Rate:  [] 88  Resp:  [18-20] 18  BP: (100-146)/(53-84) 137/57    Intake/Output Summary (Last 24 hours) at 2023 1000  Last data filed at 2023 2136  Gross per 24 hour   Intake 536 ml   Output --   Net 536 ml       Physical Exam:    General Appearance: alert, awake, chronically ill, no acute distress   Skin: warm and dry  HEENT: oral mucosa normal, nonicteric sclera  Neck: supple, no JVD  Lungs: Bilateral rhonchi, breathing effort not labored  Heart: Irregularly irregular, no rub  Abdomen: soft, nontender, nondistended  : no palpable bladder  Extremities: no edema, cyanosis or clubbing, functional AV fistula in the right forearm with good thrill and bruit  Neuro: More awake and alert moving all extremities        Scheduled Meds:     budesonide-formoterol, 2 puff, Inhalation, BID - RT  insulin regular, 2-7 Units, Subcutaneous, Q6H  levothyroxine, 75 mcg, Oral, Q AM  pantoprazole, 80 mg, Intravenous, Once  sodium bicarbonate, 650 mg, Oral, TID  sodium chloride, 500 mL, Intravenous, Once  sodium chloride, 10 mL, Intravenous, Q12H  sodium chloride, 10 mL, Intravenous, Q12H      IV Meds:   dilTIAZem, 5-15 mg/hr, Last Rate: 5 mg/hr (23 5827)  pantoprazole, 40 mg, Last Rate: 40 mg (23 6021)        Results Reviewed:   I have personally reviewed  the results from the time of this admission to 12/1/2023 10:00 EST     Results from last 7 days   Lab Units 11/30/23  0040 11/29/23  0756 11/28/23 2119 11/24/23  1836   SODIUM mmol/L 137 138 140 133*   POTASSIUM mmol/L 3.5 3.2* 4.0 3.7   CHLORIDE mmol/L 99 101 97* 98   CO2 mmol/L 24.3 26.3 26.3 22.9   BUN mg/dL 44* 36* 28* 44*   CREATININE mg/dL 3.83* 3.24* 2.74* 3.70*   CALCIUM mg/dL 8.6 8.8 9.5 9.1   BILIRUBIN mg/dL  --  0.3 0.4 0.4   ALK PHOS U/L  --  66 81 74   ALT (SGPT) U/L  --  23 31 17   AST (SGOT) U/L  --  19 27 22   GLUCOSE mg/dL 133* 133* 243* 323*       Estimated Creatinine Clearance: 16.8 mL/min (A) (by C-G formula based on SCr of 3.83 mg/dL (H)).    Results from last 7 days   Lab Units 11/30/23  0040 11/28/23 2119 11/24/23  1836   MAGNESIUM mg/dL 2.0 2.0  --    PHOSPHORUS mg/dL 3.0  --  3.2             Results from last 7 days   Lab Units 12/01/23  0017 11/30/23  1545 11/30/23  0226 11/30/23  0040 11/29/23  1622 11/29/23  0756 11/29/23  0338 11/28/23 2119 11/24/23  1836   WBC 10*3/mm3  --   --   --  7.40  --  5.51 5.39 6.63 10.01   HEMOGLOBIN g/dL 8.9* 9.3* 9.3* 9.3*  9.3* 11.2* 10.0*  10.0* 10.1* 11.3* 10.7*   PLATELETS 10*3/mm3  --   --   --  235  --  220 176 240 152       Results from last 7 days   Lab Units 11/28/23 2119   INR  1.18*       Assessment / Plan     ASSESSMENT:  End-stage renal disease and diabetic nephropathy on maintenance hemodialysis every Tuesday, Thursday and Saturday,He appears to be euvolemic, potassium is 3.5 we will switch to 4K bath currently on dialysis tolerating the treatment reasonably well  Hypokalemia associated with metabolic and respiratory alkalosis with alkaline, resolved  Atrial fibrillation with controlled ventricular rate currently on diltiazem drip  Diabetes mellitus type 2 with renal complication  Hypertension with chronic kidney disease, well controlled  Anemia of chronic kidney disease, hemoglobin today is 8.9 treated with long-acting HAI as an  outpatient  Altered mental status.  Resolved  History of coronary artery disease and prior CVA.       PLAN:  Continue the same treatment  Hemodialysis tomorrow  Surveillance labs    I reviewed the chart and other providers notes, I reviewed labs  I discussed the case with the patient.  Copied text in this note has been reviewed and is accurate as of 12/01/23.       Thank you for involving us in the care of Carlito Mo.  Please feel free to call with any questions.    Cameron Olguin MD  12/01/23  10:00 [facility]    [facility]  [phone]    Please note that portions of this note were completed with a voice recognition program.

## 2023-12-01 NOTE — PLAN OF CARE
Goal Outcome Evaluation:  Plan of Care Reviewed With: patient           Outcome Evaluation: No acute changes overnight. Cardizem gtt and protonix gtt continued. Remains in afib, controlled rate in 70s-80s. H&H q8h. VSS. Remains on RA.         Problem: Adult Inpatient Plan of Care  Goal: Plan of Care Review  Flowsheets (Taken 12/1/2023 0511)  Plan of Care Reviewed With: patient  Outcome Evaluation: No acute changes overnight. Cardizem gtt and protonix gtt continued. Remains in afib, controlled rate in 70s-80s. H&H q8h. VSS. Remains on RA.  Goal: Absence of Hospital-Acquired Illness or Injury  Intervention: Identify and Manage Fall Risk  Recent Flowsheet Documentation  Taken 12/1/2023 0401 by Sadia Singer RN  Safety Promotion/Fall Prevention: safety round/check completed  Taken 12/1/2023 0239 by Sadia Singer RN  Safety Promotion/Fall Prevention: safety round/check completed  Taken 11/30/2023 2355 by Sadia Singer RN  Safety Promotion/Fall Prevention: safety round/check completed  Taken 11/30/2023 2200 by Sadia Singer RN  Safety Promotion/Fall Prevention: safety round/check completed  Taken 11/30/2023 2002 by Sadia Singer RN  Safety Promotion/Fall Prevention: safety round/check completed  Intervention: Prevent Skin Injury  Recent Flowsheet Documentation  Taken 12/1/2023 0239 by Sadia Singer RN  Body Position: supine  Taken 11/30/2023 2355 by Sadia Singer RN  Body Position:   right   side-lying  Taken 11/30/2023 2200 by Sadia Singer RN  Body Position:   turned   left   weight shifting  Taken 11/30/2023 2002 by Sadia Singer RN  Body Position:   turned   right   side-lying  Intervention: Prevent and Manage VTE (Venous Thromboembolism) Risk  Recent Flowsheet Documentation  Taken 11/30/2023 2002 by Sadia Singer RN  Activity Management: activity encouraged  Goal: Optimal Comfort and Wellbeing  Intervention: Provide Person-Centered Care  Recent Flowsheet Documentation  Taken 11/30/2023  2355 by Sadia Singer, RN  Trust Relationship/Rapport:   care explained   choices provided  Taken 11/30/2023 2002 by Sadia Singer, RN  Trust Relationship/Rapport:   care explained   choices provided

## 2023-12-01 NOTE — PROGRESS NOTES
Name: Carlito Mo ADMIT: 2023   : 1955  PCP: Florencia Caballero MD    MRN: [id number] LOS: 2 days   AGE/SEX: 68 y.o. male  ROOM: [facility]     Subjective   Subjective     No events overnight. No complaints.       Objective   Objective   Vital Signs  Temp:  [97.5 °F (36.4 °C)-99 °F (37.2 °C)] 97.5 °F (36.4 °C)  Heart Rate:  [] 78  Resp:  [16-20] 16  BP: (124-146)/(53-84) 137/57  SpO2:  [92 %-99 %] 96 %  on   ;   Device (Oxygen Therapy): room air  Body mass index is 22.95 kg/m².  Physical Exam  Constitutional:       General: He is not in acute distress.     Appearance: He is ill-appearing. He is not toxic-appearing.   Cardiovascular:      Rate and Rhythm: Normal rate and regular rhythm.      Heart sounds: Normal heart sounds.   Pulmonary:      Effort: Pulmonary effort is normal.      Breath sounds: Normal breath sounds.   Abdominal:      General: Bowel sounds are normal.      Palpations: Abdomen is soft.   Musculoskeletal:         General: No tenderness.      Right lower leg: No edema.      Left lower leg: No edema.   Neurological:      Mental Status: He is alert.   Psychiatric:         Mood and Affect: Mood normal.         Behavior: Behavior normal.         Results Review     I reviewed the patient's new clinical results.  Results from last 7 days   Lab Units 23  0903 23  0017 23  1545 23  0226 23  0040 23  1622 23  0756 23  0338   WBC 10*3/mm3 6.62  --   --   --  7.40  --  5.51 5.39   HEMOGLOBIN g/dL 8.6* 8.9* 9.3* 9.3* 9.3*  9.3*   < > 10.0*  10.0* 10.1*   PLATELETS 10*3/mm3 260  --   --   --  235  --  220 176    < > = values in this interval not displayed.     Results from last 7 days   Lab Units 23  0903 23  0040 23  0756 11/28/23  2119   SODIUM mmol/L 134* 137 138 140   POTASSIUM mmol/L 4.5 3.5 3.2* 4.0   CHLORIDE mmol/L 104 99 101 97*   CO2 mmol/L 22.0 24.3 26.3 26.3   BUN mg/dL 27* 44* 36* 28*   CREATININE mg/dL 2.92*  3.83* 3.24* 2.74*   GLUCOSE mg/dL 173* 133* 133* 243*   Estimated Creatinine Clearance: 22.1 mL/min (A) (by C-G formula based on SCr of 2.92 mg/dL (H)).  Results from last 7 days   Lab Units 12/01/23  0903 11/30/23  0040 11/29/23  0756 11/28/23 2119 11/24/23  1836   ALBUMIN g/dL 3.0* 3.0* 3.2* 3.9 4.0   BILIRUBIN mg/dL  --   --  0.3 0.4 0.4   ALK PHOS U/L  --   --  66 81 74   AST (SGOT) U/L  --   --  19 27 22   ALT (SGPT) U/L  --   --  23 31 17     Results from last 7 days   Lab Units 12/01/23  0903 11/30/23  0040 11/29/23  0756 11/28/23 2119 11/24/23  1836   CALCIUM mg/dL 8.3* 8.6 8.8 9.5 9.1   ALBUMIN g/dL 3.0* 3.0* 3.2* 3.9 4.0   MAGNESIUM mg/dL  --  2.0  --  2.0  --    PHOSPHORUS mg/dL 1.5* 3.0  --   --  3.2     Results from last 7 days   Lab Units 11/29/23  0026 11/28/23  2120   LACTATE mmol/L 1.9 3.5*     COVID19   Date Value Ref Range Status   05/02/2023 Not Detected Not Detected - Ref. Range Final   03/07/2023 Not Detected Not Detected - Ref. Range Final   12/01/2022 Detected (C) Not Detected - Ref. Range Final   07/24/2022 Not Detected Not Detected - Ref. Range Final   06/04/2022 Not Detected Not Detected - Ref. Range Final     Glucose   Date/Time Value Ref Range Status   12/01/2023 1117 180 (H) 70 - 130 mg/dL Final   12/01/2023 0629 127 70 - 130 mg/dL Final   11/30/2023 2335 182 (H) 70 - 130 mg/dL Final   11/30/2023 2046 206 (H) 70 - 130 mg/dL Final   11/30/2023 1806 231 (H) 70 - 130 mg/dL Final   11/30/2023 1155 106 70 - 130 mg/dL Final   11/30/2023 0629 125 70 - 130 mg/dL Final       XR Chest 1 View  Narrative: SINGLE VIEW OF THE CHEST     HISTORY: Shortness of breath     COMPARISON: November 2030 is 23     FINDINGS:  The patient status post median sternotomy with coronary artery bypass  grafting. There is cardiomegaly. There is calcification of the aorta. No  pneumothorax or pleural effusion is seen. No definite acute infiltrates  are identified.     Impression: No acute findings.     This report was  finalized on 11/28/2023 9:51 PM by Dr. Alice Allen M.D on Workstation: [id number]       Scheduled Medications  budesonide-formoterol, 2 puff, Inhalation, BID - RT  carvedilol, 3.125 mg, Oral, BID With Meals  insulin regular, 2-7 Units, Subcutaneous, Q6H  levothyroxine, 75 mcg, Oral, Q AM  pantoprazole, 40 mg, Intravenous, BID  sodium bicarbonate, 650 mg, Oral, TID  sodium chloride, 500 mL, Intravenous, Once  sodium chloride, 10 mL, Intravenous, Q12H  sodium chloride, 10 mL, Intravenous, Q12H  sodium phosphates 10 mmol in sodium chloride 0.9 % 100 mL IVPB, 10 mmol, Intravenous, Once    Infusions  dilTIAZem, 5-15 mg/hr, Last Rate: 5 mg/hr (12/01/23 0358)  pantoprazole, 40 mg, Last Rate: 40 mg (12/01/23 0936)    Diet  Diet: Cardiac Diets, Diabetic Diets; Healthy Heart (2-3 Na+); Consistent Carbohydrate; Texture: Regular Texture (IDDSI 7); Fluid Consistency: Thin (IDDSI 0)       Assessment/Plan     Active Hospital Problems    Diagnosis  POA    **Atrial fibrillation with rapid ventricular response [I48.91]  Yes    ESRD (end stage renal disease) [N18.6]  Yes    Nausea & vomiting [R11.2]  Yes    Hemoptysis [R04.2]  Yes    Embolic stroke [I63.9]  Yes    Coronary artery disease involving native coronary artery of native heart without angina pectoris [I25.10]  Yes    Chronic combined systolic and diastolic CHF (congestive heart failure) [I50.42]  Yes    Acquired hypothyroidism [E03.9]  Yes    Benign prostatic hyperplasia with nocturia [N40.1, R35.1]  Yes    Type 2 diabetes mellitus, with long-term current use of insulin [E11.9, Z79.4]  Not Applicable      Resolved Hospital Problems   No resolved problems to display.       68 y.o. male admitted with Atrial fibrillation with rapid ventricular response.    GI bleeding/coffee ground emesis/diarrhea-thought to be secondary to viral gastroenteritis with perhaps a small giancarlo-lr tear. eliquis on hold. On iv ppi bid. GI is planning EGD for Monday  Afib with rvr-on  diltiazem drip. Cardiology is adding back low dose coreg. AC held as above  Hypophosphatemia-being replaced  Cardiogenic hypotension due to afib-improved  Elevated troponin due to ESRD and type 2 NSTEMI from rvr-no plans for ischemic workup at this time.  Metabolic encephalopathy-resolved  Coronary artery disease s/p CABG in 2001-asa and statin on hold. Coreg added as above  Chronic combined systolic and diastolic heart failure-bb. Torsemide is held. euvolemic  ESRD on HD T, TH, Sa-nephrology is managing dialysis  Anemia of chronic disease-hgb is 8.6. on HAI as an outpatient  Type 2 diabetes-A1c was 6.6 in may. Will update. Glucose is at goal acutely with sliding scale alone  COPD with chronic respiratory failure with hypoxia on home O2-symbicort  Hypothyroidism-synthroid  History of embolic CVA-eliquis on hold as above  SCDs for DVT prophylaxis.  Full code.  Discussed with patient.  Anticipate discharge to SNU facility timing yet to be determined.      Delta Marie MD  [facility]  12/01/23  11:47 EST    I wore protective equipment throughout this patient encounter including a face mask, gloves and protective eyewear.  Hand hygiene was performed before donning protective equipment and after removal when leaving the room.

## 2023-12-01 NOTE — PROGRESS NOTES
Gastroenterology   Inpatient Progress Note    Reason for Follow Up: Coffee-ground emesis    Subjective     Interval History:   Last dose of Plavix was given 11/28.  Patient not very talkative this morning.  Denies any nausea, vomiting, or abdominal pain.  States he is eating and tolerating his diet.    Current Facility-Administered Medications:     acetaminophen (TYLENOL) tablet 650 mg, 650 mg, Oral, Q4H PRN **OR** acetaminophen (TYLENOL) 160 MG/5ML oral solution 650 mg, 650 mg, Oral, Q4H PRN **OR** acetaminophen (TYLENOL) suppository 650 mg, 650 mg, Rectal, Q4H PRN, Bernardo Amaya MD    acetaminophen (TYLENOL) tablet 650 mg, 650 mg, Oral, Q4H PRN **OR** acetaminophen (TYLENOL) suppository 650 mg, 650 mg, Rectal, Q4H PRN, Bernardo Amaya MD    budesonide-formoterol (SYMBICORT) 160-4.5 MCG/ACT inhaler 2 puff, 2 puff, Inhalation, BID - RT, Bernardo Amaya MD, 2 puff at 11/30/23 0823    calcium carbonate (TUMS) chewable tablet 500 mg (200 mg elemental), 2 tablet, Oral, BID PRN, Bernardo Amaya MD    dextrose (D50W) (25 g/50 mL) IV injection 25 g, 25 g, Intravenous, Q15 Min PRN, Bernardo Amaya MD    dextrose (D50W) (25 g/50 mL) IV injection 25 g, 25 g, Intravenous, Q15 Min PRN, Bernardo Amaya MD    dextrose (GLUTOSE) oral gel 15 g, 15 g, Oral, Q15 Min PRN, Bernardo Amaya MD    dilTIAZem (CARDIZEM) 100 mg in 100 mL NS infusion (ADV), 5-15 mg/hr, Intravenous, Titrated, Bernardo Amaya MD, Last Rate: 5 mL/hr at 12/01/23 0358, 5 mg/hr at 12/01/23 0358    glucagon (GLUCAGEN) injection 1 mg, 1 mg, Intramuscular, Q15 Min PRN, Bernardo Amaya MD    insulin regular (humuLIN R,novoLIN R) injection 2-7 Units, 2-7 Units, Subcutaneous, Q6H, Bernardo Amaya MD, 2 Units at 11/30/23 7143    ipratropium-albuterol (DUO-NEB) nebulizer solution 3 mL, 3 mL, Nebulization, Q6H PRN, Bernardo Amaya MD    levothyroxine (SYNTHROID, LEVOTHROID) tablet 75 mcg, 75 mcg, Oral, Q AM, Bernardo Amaya MD, 75 mcg at 12/01/23 9286     nitroglycerin (NITROSTAT) SL tablet 0.4 mg, 0.4 mg, Sublingual, Q5 Min PRN, Bernardo Amaya MD    ondansetron (ZOFRAN) tablet 4 mg, 4 mg, Oral, Q6H PRN **OR** ondansetron (ZOFRAN) injection 4 mg, 4 mg, Intravenous, Q6H PRN, Bernardo Amaya MD    pantoprazole (PROTONIX) 40 mg in 100 mL NS (VTB), 40 mg, Intravenous, Continuous, Bernardo Amaya MD, Last Rate: 20 mL/hr at 12/01/23 0358, 40 mg at 12/01/23 0358    pantoprazole (PROTONIX) injection 80 mg, 80 mg, Intravenous, Once, Bernardo Amaya MD    sodium bicarbonate tablet 650 mg, 650 mg, Oral, TID, Bernardo Amaya MD, 650 mg at 11/30/23 1649    sodium chloride 0.9 % bolus 500 mL, 500 mL, Intravenous, Once, Bernardo Amaya MD    sodium chloride 0.9 % flush 10 mL, 10 mL, Intravenous, PRN, Bernardo Amaya MD    Insert Peripheral IV, , , Once **AND** sodium chloride 0.9 % flush 10 mL, 10 mL, Intravenous, PRNMonique Ervin H., MD    sodium chloride 0.9 % flush 10 mL, 10 mL, Intravenous, Q12H, Bernardo Amaya MD, 10 mL at 11/30/23 2002    sodium chloride 0.9 % flush 10 mL, 10 mL, Intravenous, PRNMonique Ervin H., MD    sodium chloride 0.9 % flush 10 mL, 10 mL, Intravenous, Q12H, Bernardo Amaya MD, 10 mL at 11/30/23 2002    sodium chloride 0.9 % flush 10 mL, 10 mL, Intravenous, PRNMonique Ervin H., MD    sodium chloride 0.9 % infusion 40 mL, 40 mL, Intravenous, PRNMonique Ervin H., MD    sodium chloride 0.9 % infusion 40 mL, 40 mL, Intravenous, PRNMonique Ervin H., MD  Review of Systems:    The following systems were reviewed and negative;  gastrointestinal    Objective     Vital Signs  Temp:  [97.5 °F (36.4 °C)-99 °F (37.2 °C)] 97.5 °F (36.4 °C)  Heart Rate:  [] 88  Resp:  [18-20] 20  BP: ()/(53-84) 137/57  Body mass index is 22.95 kg/m².    Intake/Output Summary (Last 24 hours) at 12/1/2023 0826  Last data filed at 11/30/2023 2136  Gross per 24 hour   Intake 536 ml   Output --   Net 536 ml     No intake/output data recorded.     Physical  Exam:   General: patient awake, alert and cooperative   Eyes: no scleral icterus   Skin: warm and dry, not jaundiced   Abdomen: soft, nontender, nondistended; normal bowel sounds, no masses palpated, no periumbical lymphadenopathy   Psychiatric: Appropriate affect and behavior     Results Review:     I reviewed the patient's new clinical results.  I reviewed the patient's new imaging results and agree with the interpretation.  I reviewed the patient's other test results and agree with the interpretation    Results from last 7 days   Lab Units 12/01/23  0017 11/30/23  1545 11/30/23  0226 11/30/23  0040 11/29/23  1622 11/29/23  0756 11/29/23  0338   WBC 10*3/mm3  --   --   --  7.40  --  5.51 5.39   HEMOGLOBIN g/dL 8.9* 9.3* 9.3* 9.3*  9.3*   < > 10.0*  10.0* 10.1*   HEMATOCRIT % 28.4* 30.3* 29.6* 28.6*  28.6*   < > 31.5*  31.5* 32.0*   PLATELETS 10*3/mm3  --   --   --  235  --  220 176    < > = values in this interval not displayed.     Results from last 7 days   Lab Units 11/30/23  0040 11/29/23 0756 11/28/23 2119 11/24/23  1836   SODIUM mmol/L 137 138 140 133*   POTASSIUM mmol/L 3.5 3.2* 4.0 3.7   CHLORIDE mmol/L 99 101 97* 98   CO2 mmol/L 24.3 26.3 26.3 22.9   BUN mg/dL 44* 36* 28* 44*   CREATININE mg/dL 3.83* 3.24* 2.74* 3.70*   CALCIUM mg/dL 8.6 8.8 9.5 9.1   BILIRUBIN mg/dL  --  0.3 0.4 0.4   ALK PHOS U/L  --  66 81 74   ALT (SGPT) U/L  --  23 31 17   AST (SGOT) U/L  --  19 27 22   GLUCOSE mg/dL 133* 133* 243* 323*     Results from last 7 days   Lab Units 11/28/23 2119   INR  1.18*     Lab Results   Lab Value Date/Time    LIPASE 27 11/28/2023 2119    LIPASE 31 11/24/2023 1836    LIPASE 33 12/13/2021 1614    LIPASE 58 11/30/2017 2155    LIPASE 30 08/27/2016 1627    LIPASE 30 02/14/2016 0635       Radiology:  XR Chest 1 View   Final Result   No acute findings.       This report was finalized on 11/28/2023 9:51 PM by Dr. Alice Allen M.D on Workstation: [id number]              Assessment & Plan      Active Hospital Problems    Diagnosis     **Atrial fibrillation with rapid ventricular response        Assessment:  Nausea and vomiting  Coffee-ground emesis-resolved  Anemia  Diarrhea-solved  CAD on DOAC  Chronic combined CHF  Cardiomyopathy  Right bundle branch block  ESRD on HD  Altered mental status  COPD on home O2  DM  Elevated troponin secondary to ESRD and possible type II NSTEMI      Plan:  It appears the patient may have a possible viral gastroenteritis associated nausea vomiting diarrhea.   Area has resolved per patient report  Continue supportive care  Previous coffee-ground emesis could be secondary to small Vanessa-Casarez tear.  Continue to hold oral anticoagulation.  Continue PPI IV BID  Hemoglobin 8.9  Continue to monitor lab work  Cardiology following for elevated troponin in the setting of A-fib/RVR.  Cardiology gave clearance for EGD when okay from GI standpoint.  Could plan for EGD on Monday as last dose of Plavix was given on 11/28.  We will continue to follow    I discussed the patients findings and my recommendations with patient.    JI Mathur APRN  [facility] [place] [address]  Office: [phone]

## 2023-12-01 NOTE — PROGRESS NOTES
LOS: 2 days   Patient Care Team:  Florencia Caballero MD as PCP - General (Internal Medicine)  Amber Murguia MD as Consulting Physician (Cardiology)  Sera La formerly [facility] as Pharmacist  Seth Ladd MD as Surgeon (General Surgery)  Kim Hoyos MD PhD as Consulting Physician (Hematology and Oncology)  Jerome Diallo MD as Referring Physician (Family Medicine)  Jose Enrique Louie MD as Consulting Physician (Gastroenterology)  Nima Huddleston MD as Consulting Physician (Nephrology)    Chief Complaint: Follow-up coffee-ground emesis, chronic combined CHF, paroxysmal atrial fibrillation with RVR.    Interval History: He is doing better today in general.  His atrial fibrillation rate is better controlled.  He is still on 5 mg/h on a diltiazem drip.  No chest pain.  No further coffee-ground emesis.    Vital Signs:  Temp:  [97.5 °F (36.4 °C)-99 °F (37.2 °C)] 97.5 °F (36.4 °C)  Heart Rate:  [] 78  Resp:  [16-20] 16  BP: (124-146)/(53-84) 137/57    Intake/Output Summary (Last 24 hours) at 12/1/2023 1324  Last data filed at 11/30/2023 2136  Gross per 24 hour   Intake 536 ml   Output --   Net 536 ml       Physical Exam:   General Appearance:    No acute distress, alert and oriented x4, pale, chronically ill-appearing.   Lungs:     Clear to auscultation bilaterally     Heart:    Irregularly irregular with a normal rate.  No murmurs, gallops, or rubs noted.    Abdomen:     Soft, nontender, nondistended.    Extremities:   No clubbing, cyanosis, or edema.     Results Review:    Results from last 7 days   Lab Units 12/01/23  0903   SODIUM mmol/L 134*   POTASSIUM mmol/L 4.5   CHLORIDE mmol/L 104   CO2 mmol/L 22.0   BUN mg/dL 27*   CREATININE mg/dL 2.92*   GLUCOSE mg/dL 173*   CALCIUM mg/dL 8.3*     Results from last 7 days   Lab Units 11/30/23  0226 11/30/23  0040 11/29/23  0756   HSTROP T ng/L 170* 173* 155*     Results from last 7 days   Lab Units 12/01/23  0903   WBC 10*3/mm3 6.62   HEMOGLOBIN g/dL  8.6*   HEMATOCRIT % 28.3*   PLATELETS 10*3/mm3 260     Results from last 7 days   Lab Units 11/28/23  2119   INR  1.18*         Results from last 7 days   Lab Units 11/30/23  0040   MAGNESIUM mg/dL 2.0           I reviewed the patient's new clinical results.        Assessment:  1.  Recent vomiting and diarrhea, possibly viral gastroenteritis  2.  Coffee-ground emesis  3.  Paroxysmal atrial fibrillation with RVR  4.  Chronic combined CHF  5.  Cardiomyopathy, EF 40 to 45% on 3/8/2023  6.  Coronary artery disease, status post CABG in 2001  7.  History of embolic CVA, on chronic anticoagulation  8.  End-stage renal disease, on hemodialysis  9.  Altered mental status, likely multifactorial  10.  Anemia of chronic disease  11.  COPD without acute exacerbation (on home O2)  12.  Right bundle branch block, chronic  13.  Elevated troponin secondary to renal disease and type II NSTEMI secondary to #1 and #3  14.  Diabetes    Plan:  -Doing better in general.  His heart rate is better controlled.  He is still on 5 mg/h on a diltiazem drip.  I am going to see if his blood pressure will tolerate adding back a low-dose of Coreg.  He is normally on 6.25 mg twice a day, although I am going to try him on 3.125 mg twice a day.  Wean Cardizem drip as tolerated.    -He did get 500 mcg of IV digoxin yesterday.  However, I would not give him further digoxin given his end-stage renal disease.    -Plans for potential EGD on Monday.  Clear at acceptable risk from a cardiac standpoint.    -Troponin elevation is secondary to renal disease and a type II NSTEMI secondary to the atrial fibrillation with RVR.  I do not feel this is ACS.    -Plans for hemodialysis tomorrow.  He did get hypotensive and tachycardic on the last session.  Continue to monitor.    -Holding Eliquis because of potential GI bleeding.  He is on IV Protonix.    Mauricio Corona MD  12/01/23  13:24 EST

## 2023-12-01 NOTE — PLAN OF CARE
Goal Outcome Evaluation:  Plan of Care Reviewed With: patient        Progress: no change  Outcome Evaluation: HR consistently in the 70s today in afib while on Cardizem 5mg drip.  Initiated po coreg to be able to wean off drip. Sleeping throughout shift.  Easily awakened but returns to sleep.

## 2023-12-01 NOTE — PLAN OF CARE
Goal Outcome Evaluation:  Plan of Care Reviewed With: patient           Outcome Evaluation: Pt is a 67 yo male who presents from home with nausea/vomiting, hypotension, afib with RVR, upper GI bleed. PMH of DM2, COPD, ESRD on HD. Prior to admission, pt was living at home with spouse and used cane for mobility. Pt states he was having trouble negotiating stairs at home after dialysis. Upon exam, pt somewhat drowsy but agreeable to PT. Pt required mod/max A for bed mobility and mod A for sitting balance at EOB. Pt was too weak and fatigued to attempt transfers. Pt is at increased risk of falls and will continue to benefit from PT to address impairments and increase independence with mobility. Recommend DC to SNF for ongoing rehab.      Anticipated Discharge Disposition (PT): skilled nursing facility

## 2023-12-01 NOTE — THERAPY EVALUATION
Patient Name: Carlito Mo  : 1955    MRN: [id number]                              Today's Date: 2023       Admit Date: 2023    Visit Dx:     ICD-10-CM ICD-9-CM   1. Atrial fibrillation with rapid ventricular response  I48.91 427.31   2. Nausea and vomiting, unspecified vomiting type  R11.2 787.01   3. End stage renal disease on dialysis  N18.6 585.6    Z99.2 V45.11   4. Coagulopathy: Eliquis induced  D68.9 286.9   5. Upper GI bleed  K92.2 578.9     Patient Active Problem List   Diagnosis    Major depressive disorder with single episode, in partial remission    Other hyperlipidemia    Type 2 diabetes mellitus, with long-term current use of insulin    Vitamin D deficiency    Erectile dysfunction    Chronic fatigue    Type 2 diabetes mellitus with ophthalmic complication    Skin lesion of chest wall    Slow transit constipation    Benign prostatic hyperplasia with nocturia    History of basal cell carcinoma    High blood pressure associated with diabetes    Acquired hypothyroidism    Hypertensive urgency    Recurrent strokes    Noncompliance w/medication treatment due to intermit use of medication    Urinary retention    Pneumonia    Chronic combined systolic and diastolic CHF (congestive heart failure)    Acute respiratory failure with hypoxia    Suspected sleep apnea    Pleural effusion    YAW (obstructive sleep apnea)    Coronary artery disease involving native coronary artery of native heart without angina pectoris    Chronically elevated troponin    Colon cancer screening    Enlarged lymph nodes    Functional gait abnormality    History of myocardial infarction    Hospital discharge follow-up    Insomnia    Iron deficiency anemia    Major depression, recurrent    Anemia, chronic renal failure, stage 3 (moderate)    Essential hypertension    Adverse effect of iron and its compounds, initial encounter    Acute on chronic renal insufficiency    Debility    Falls frequently    Acute pain of right  shoulder    Acute on chronic anemia    Heme positive stool    Neurogenic orthostatic hypotension    Anemia, chronic disease    History of colon polyps    Helicobacter pylori gastritis    Esophagitis    Sleep related hypoxia    Embolic stroke    Patent foramen ovale    Pleural effusion, bilateral    Cardiomyopathy    Lower abdominal pain    Diarrhea    CKD (chronic kidney disease) stage 4, GFR 15-29 ml/min    Hypertension not at goal    Memory loss due to medical condition    Generalized weakness    ERRONEOUS ENCOUNTER--DISREGARD    Weakness    Paroxysmal atrial fibrillation    Chronic anticoagulation    Multiple falls    Dehydration    SYLVAIN (acute kidney injury)    Acute ischemic stroke    Acute combined systolic and diastolic congestive heart failure    History of stroke    Iron deficiency anemia    Acute HFrEF (heart failure with reduced ejection fraction)    Atrial fibrillation with rapid ventricular response    ESRD (end stage renal disease)    Nausea & vomiting    Hemoptysis     Past Medical History:   Diagnosis Date    Acquired hypothyroidism     Acute congestive heart failure     Acute respiratory failure with hypoxia     Acute sinusitis     SYLVAIN (acute kidney injury)     on CKD    Anemia     Arthritis     CAD (coronary artery disease)     Cancer     skin    Chronic combined systolic and diastolic congestive heart failure     Chronic ischemic heart disease     Chronic kidney disease (CKD)     CKD (chronic kidney disease)     COPD (chronic obstructive pulmonary disease)     Depression     Diabetes mellitus type 2, uncontrolled, without complications     Diabetic neuropathy     Disease of thyroid gland     Elevated cholesterol     Fatigue     Frequent PVCs     Generalized weakness     GERD (gastroesophageal reflux disease)     Heart attack     History of coronary artery disease     with remote history of bypass surgery in 2001    Hyperlipidemia     Hypertension     Hypertensive encephalopathy     Mental status  change     Acute    Nausea & vomiting 12/01/2023    NO DEVICE/RECALLED     Pleural effusion     Pneumonia     Poor historian     RBBB (right bundle branch block)     Stroke     POOR VISION    TIA (transient ischemic attack)     Type 2 diabetes mellitus     Urinary retention     Vitamin D deficiency      Past Surgical History:   Procedure Laterality Date    APPENDECTOMY N/A 02/14/2016    Dr. Alexey Dodson    ARTERIOVENOUS FISTULA/SHUNT SURGERY Right 06/03/2022    Procedure: RIGHT FOREARM ARTERIOVENOUS FISTULA PLACEMENT RADIOCEPHALIC WITH CEPHALIC VEIN TRANSPOSITION;  Surgeon: Eliseo Willis MD;  Location: [facility] MAIN OR;  Service: Vascular;  Laterality: Right;    COLONOSCOPY      over 20 years ago.  no polyps     COLONOSCOPY N/A 09/18/2018    Procedure: COLONOSCOPY;  Surgeon: Jose Enrique Louie MD;  Location: [facility] ENDOSCOPY;  Service: Gastroenterology    COLONOSCOPY N/A 09/19/2018    IH, EH, polyps (TAs w/low grade dysplasia)    COLONOSCOPY N/A 06/01/2020    Procedure: COLONOSCOPY;  Surgeon: Jose Enrique Louie MD;  Location: [facility] ENDOSCOPY;  Service: Gastroenterology;  Laterality: N/A;  Pre op-Anemia, Melena, History of Polyps  Post op-To Cecum/TI, Polyp, Poor Prep    CORONARY ARTERY BYPASS GRAFT  11/2001    ENDOSCOPY N/A 05/29/2020    Procedure: ESOPHAGOGASTRODUODENOSCOPY with biopsies;  Surgeon: Amanda Lovelace MD;  Location: [facility] ENDOSCOPY;  Service: Gastroenterology;  Laterality: N/A;  pre-anemia, dark stools  post-esophagitis, hiatal hernia    ENDOSCOPY N/A 09/15/2020    Procedure: ESOPHAGOGASTRODUODENOSCOPY WITH BIOPSIES;  Surgeon: Jose Enrique Louie MD;  Location: [facility] ENDOSCOPY;  Service: Gastroenterology;  Laterality: N/A;  pre: history of melena and esophagitis  post: mild esophagitis and gastritis, small hiatal hernia    HERNIA REPAIR Left 12/05/2019    THORACENTESIS      TONSILLECTOMY      VASECTOMY        General Information       Row Name 12/01/23 0913          Physical Therapy  Time and Intention    Document Type evaluation  -EE     Mode of Treatment individual therapy;physical therapy  -EE       Row Name 12/01/23 1513          General Information    Prior Level of Function independent:;all household mobility  uses cane  -EE     Existing Precautions/Restrictions fall  -EE     Barriers to Rehab medically complex  -EE       Row Name 12/01/23 1513          Living Environment    People in Home spouse  -EE       Row Name 12/01/23 1513          Home Main Entrance    Number of Stairs, Main Entrance two  -EE       Row Name 12/01/23 1513          Stairs Within Home, Primary    Stairs, Within Home, Primary bedroom second floor  -EE     Number of Stairs, Within Home, Primary nine  -EE       Row Name 12/01/23 1513          Cognition    Orientation Status (Cognition) oriented x 3  -EE       Row Name 12/01/23 1513          Safety Issues, Functional Mobility    Impairments Affecting Function (Mobility) balance;endurance/activity tolerance;postural/trunk control;strength  -EE     Comment, Safety Issues/Impairments (Mobility) pt very drowsy, frequent cues to stay awake during PT session  -EE               User Key  (r) = Recorded By, (t) = Taken By, (c) = Cosigned By      Initials Name Provider Type    EE Katrin Garcia, PT Physical Therapist                   Mobility       Row Name 12/01/23 1515          Bed Mobility    Bed Mobility supine-sit;sit-supine  -EE     Supine-Sit [place] (Bed Mobility) moderate assist (50% patient effort);maximum assist (25% patient effort);verbal cues;1 person assist  -EE     Sit-Supine [place] (Bed Mobility) maximum assist (25% patient effort);1 person assist;verbal cues  -EE     Assistive Device (Bed Mobility) head of bed elevated;bed rails  -EE       Row Name 12/01/23 1515          Transfers    Comment, (Transfers) too drowsy to attempt standing; required up to mod A for static sitting balance at EOB  -EE               User Key  (r) = Recorded By, (t) = Taken By,  (c) = Cosigned By      Initials Name Provider Type    EE Katrin Garcia, PT Physical Therapist                   Obj/Interventions       Row Name 12/01/23 1516          Range of Motion Comprehensive    General Range of Motion bilateral lower extremity ROM WFL  -EE       Row Name 12/01/23 1516          Strength Comprehensive (MMT)    General Manual Muscle Testing (MMT) Assessment lower extremity strength deficits identified  -EE     Comment, General Manual Muscle Testing (MMT) Assessment generalized weakness noted functionally; unable to formally assess 2/2 drowsiness  -EE       Row Name 12/01/23 1516          Balance    Balance Assessment sitting static balance  -EE     Static Sitting Balance minimal assist;moderate assist  -EE     Position, Sitting Balance supported;sitting edge of bed  -EE     Comment, Balance Sat EOB x3 min, cues to stay awake, leans posteriorly  -EE       Row Name 12/01/23 1516          Sensory Assessment (Somatosensory)    Sensory Assessment (Somatosensory) not tested  -EE               User Key  (r) = Recorded By, (t) = Taken By, (c) = Cosigned By      Initials Name Provider Type    EE Katrin Garcia, PT Physical Therapist                   Goals/Plan       Row Name 12/01/23 1524          Bed Mobility Goal 1 (PT)    Activity/Assistive Device (Bed Mobility Goal 1, PT) sit to supine/supine to sit  -EE     [place] Level/Cues Needed (Bed Mobility Goal 1, PT) minimum assist (75% or more patient effort)  -EE     Time Frame (Bed Mobility Goal 1, PT) 1 week  -EE     Progress/Outcomes (Bed Mobility Goal 1, PT) goal ongoing  -EE       Row Name 12/01/23 1524          Transfer Goal 1 (PT)    Activity/Assistive Device (Transfer Goal 1, PT) sit-to-stand/stand-to-sit;walker, rolling  -EE     [place] Level/Cues Needed (Transfer Goal 1, PT) minimum assist (75% or more patient effort)  -EE     Time Frame (Transfer Goal 1, PT) 1 week  -EE     Progress/Outcome (Transfer Goal 1, PT) goal ongoing  -EE        Row Name 12/01/23 1524          Gait Training Goal 1 (PT)    Activity/Assistive Device (Gait Training Goal 1, PT) gait (walking locomotion);walker, rolling  -EE     [place] Level (Gait Training Goal 1, PT) minimum assist (75% or more patient effort)  -EE     Distance (Gait Training Goal 1, PT) 50'  -EE     Time Frame (Gait Training Goal 1, PT) 1 week  -EE     Progress/Outcome (Gait Training Goal 1, PT) goal ongoing  -EE       Row Name 12/01/23 1524          Therapy Assessment/Plan (PT)    Planned Therapy Interventions (PT) balance training;bed mobility training;gait training;home exercise program;strengthening;patient/family education;stretching;transfer training;postural re-education  -EE               User Key  (r) = Recorded By, (t) = Taken By, (c) = Cosigned By      Initials Name Provider Type    EE Katrin Garcia, PT Physical Therapist                   Clinical Impression       Row Name 12/01/23 1517          Pain    Pretreatment Pain Rating 0/10 - no pain  -EE     Posttreatment Pain Rating 0/10 - no pain  -EE       Row Name 12/01/23 1517          Plan of Care Review    Plan of Care Reviewed With patient  -EE     Outcome Evaluation Pt is a 67 yo male who presents from home with nausea/vomiting, hypotension, afib with RVR, upper GI bleed. PMH of DM2, COPD, ESRD on HD. Prior to admission, pt was living at home with spouse and used cane for mobility. Pt states he was having trouble negotiating stairs at home after dialysis. Upon exam, pt somewhat drowsy but agreeable to PT. Pt required mod/max A for bed mobility and mod A for sitting balance at EOB. Pt was too weak and fatigued to attempt transfers. Pt is at increased risk of falls and will continue to benefit from PT to address impairments and increase independence with mobility. Recommend DC to SNF for ongoing rehab.  -EE       Row Name 12/01/23 1517          Therapy Assessment/Plan (PT)    Rehab Potential (PT) fair, will monitor progress closely  -EE      Criteria for Skilled Interventions Met (PT) yes;meets criteria  -EE     Therapy Frequency (PT) 5 times/wk  -EE       Row Name 12/01/23 1517          Positioning and Restraints    Pre-Treatment Position in bed  -EE     Post Treatment Position bed  -EE     In Bed fowlers;call light within reach;encouraged to call for assist;exit alarm on;notified nsg  -EE               User Key  (r) = Recorded By, (t) = Taken By, (c) = Cosigned By      Initials Name Provider Type     Katrin Garcia, DENISSE Physical Therapist                   Outcome Measures       Row Name 12/01/23 1525 12/01/23 0824       How much help from another person do you currently need...    Turning from your back to your side while in flat bed without using bedrails? 3  -EE 3  -JL    Moving from lying on back to sitting on the side of a flat bed without bedrails? 2  -EE 3  -JL    Moving to and from a bed to a chair (including a wheelchair)? 2  -EE 3  -JL    Standing up from a chair using your arms (e.g., wheelchair, bedside chair)? 2  -EE 3  -JL    Climbing 3-5 steps with a railing? 1  -EE 3  -JL    To walk in hospital room? 2  -EE 3  -JL    AM-PAC 6 Clicks Score (PT) 12  -EE 18  -JL    Highest Level of Mobility Goal 4 --> Transfer to chair/commode  -EE 6 --> Walk 10 steps or more  -JL              User Key  (r) = Recorded By, (t) = Taken By, (c) = Cosigned By      Initials Name Provider Type    Katrin Bedoya PT Physical Therapist    Yovani Delgado, RN Registered Nurse                                 Physical Therapy Education       Title: PT OT SLP Therapies (In Progress)       Topic: Physical Therapy (In Progress)       Point: Mobility training (Done)       Learning Progress Summary             Patient Acceptance, E,TB, VU,NR by  at 12/1/2023 1525                         Point: Home exercise program (Not Started)       Learner Progress:  Not documented in this visit.              Point: Body mechanics (Done)       Learning Progress Summary              Patient Acceptance, E,TB, VU,NR by EE at 12/1/2023 1525                         Point: Precautions (Not Started)       Learner Progress:  Not documented in this visit.                              User Key       Initials Effective Dates Name Provider Type Discipline    EE 06/16/21 -  Katrin Garcia PT Physical Therapist PT                  PT Recommendation and Plan  Planned Therapy Interventions (PT): balance training, bed mobility training, gait training, home exercise program, strengthening, patient/family education, stretching, transfer training, postural re-education  Plan of Care Reviewed With: patient  Outcome Evaluation: Pt is a 69 yo male who presents from home with nausea/vomiting, hypotension, afib with RVR, upper GI bleed. PMH of DM2, COPD, ESRD on HD. Prior to admission, pt was living at home with spouse and used cane for mobility. Pt states he was having trouble negotiating stairs at home after dialysis. Upon exam, pt somewhat drowsy but agreeable to PT. Pt required mod/max A for bed mobility and mod A for sitting balance at EOB. Pt was too weak and fatigued to attempt transfers. Pt is at increased risk of falls and will continue to benefit from PT to address impairments and increase independence with mobility. Recommend DC to SNF for ongoing rehab.     Time Calculation:         PT Charges       Row Name 12/01/23 1526             Time Calculation    Start Time 1438  -EE      Stop Time 1453  -EE      Time Calculation (min) 15 min  -EE      PT Received On 12/01/23  -EE      PT - Next Appointment 12/04/23  -EE      PT Goal Re-Cert Due Date 12/08/23  -EE                User Key  (r) = Recorded By, (t) = Taken By, (c) = Cosigned By      Initials Name Provider Type    EE Katrin Garcai PT Physical Therapist                  Therapy Charges for Today       Code Description Service Date Service Provider Modifiers Qty    81249927005 HC PT EVAL MOD COMPLEXITY 2 12/1/2023 Katrin Garcia, PT GP 1            PT  G-Codes  AM-PAC 6 Clicks Score (PT): 12  PT Discharge Summary  Anticipated Discharge Disposition (PT): skilled nursing facility    Katrin Garcia, PT  12/1/2023

## 2023-12-02 LAB
ALBUMIN SERPL-MCNC: 2.8 G/DL (ref 3.5–5.2)
ALBUMIN SERPL-MCNC: 3.3 G/DL (ref 3.5–5.2)
ANION GAP SERPL CALCULATED.3IONS-SCNC: 5 MMOL/L (ref 5–15)
ANION GAP SERPL CALCULATED.3IONS-SCNC: 7.6 MMOL/L (ref 5–15)
BASOPHILS # BLD AUTO: 0.01 10*3/MM3 (ref 0–0.2)
BASOPHILS # BLD AUTO: 0.02 10*3/MM3 (ref 0–0.2)
BASOPHILS NFR BLD AUTO: 0.1 % (ref 0–1.5)
BASOPHILS NFR BLD AUTO: 0.3 % (ref 0–1.5)
BUN SERPL-MCNC: 14 MG/DL (ref 8–23)
BUN SERPL-MCNC: 33 MG/DL (ref 8–23)
BUN/CREAT SERPL: 10.5 (ref 7–25)
BUN/CREAT SERPL: 8.3 (ref 7–25)
CALCIUM SPEC-SCNC: 8.3 MG/DL (ref 8.6–10.5)
CALCIUM SPEC-SCNC: 8.3 MG/DL (ref 8.6–10.5)
CHLORIDE SERPL-SCNC: 103 MMOL/L (ref 98–107)
CHLORIDE SERPL-SCNC: 105 MMOL/L (ref 98–107)
CO2 SERPL-SCNC: 24.4 MMOL/L (ref 22–29)
CO2 SERPL-SCNC: 29 MMOL/L (ref 22–29)
CREAT SERPL-MCNC: 1.68 MG/DL (ref 0.76–1.27)
CREAT SERPL-MCNC: 3.14 MG/DL (ref 0.76–1.27)
DEPRECATED RDW RBC AUTO: 40 FL (ref 37–54)
DEPRECATED RDW RBC AUTO: 40.7 FL (ref 37–54)
EGFRCR SERPLBLD CKD-EPI 2021: 20.8 ML/MIN/1.73
EGFRCR SERPLBLD CKD-EPI 2021: 44 ML/MIN/1.73
EOSINOPHIL # BLD AUTO: 0.19 10*3/MM3 (ref 0–0.4)
EOSINOPHIL # BLD AUTO: 0.26 10*3/MM3 (ref 0–0.4)
EOSINOPHIL NFR BLD AUTO: 2.7 % (ref 0.3–6.2)
EOSINOPHIL NFR BLD AUTO: 3.4 % (ref 0.3–6.2)
ERYTHROCYTE [DISTWIDTH] IN BLOOD BY AUTOMATED COUNT: 13.1 % (ref 12.3–15.4)
ERYTHROCYTE [DISTWIDTH] IN BLOOD BY AUTOMATED COUNT: 13.1 % (ref 12.3–15.4)
GLUCOSE BLDC GLUCOMTR-MCNC: 156 MG/DL (ref 70–130)
GLUCOSE BLDC GLUCOMTR-MCNC: 174 MG/DL (ref 70–130)
GLUCOSE BLDC GLUCOMTR-MCNC: 222 MG/DL (ref 70–130)
GLUCOSE SERPL-MCNC: 174 MG/DL (ref 65–99)
GLUCOSE SERPL-MCNC: 238 MG/DL (ref 65–99)
HBA1C MFR BLD: 6.9 % (ref 4.8–5.6)
HCT VFR BLD AUTO: 25.8 % (ref 37.5–51)
HCT VFR BLD AUTO: 26 % (ref 37.5–51)
HCT VFR BLD AUTO: 28.8 % (ref 37.5–51)
HGB BLD-MCNC: 8.1 G/DL (ref 13–17.7)
HGB BLD-MCNC: 8.1 G/DL (ref 13–17.7)
HGB BLD-MCNC: 8.8 G/DL (ref 13–17.7)
IMM GRANULOCYTES # BLD AUTO: 0.17 10*3/MM3 (ref 0–0.05)
IMM GRANULOCYTES # BLD AUTO: 0.22 10*3/MM3 (ref 0–0.05)
IMM GRANULOCYTES NFR BLD AUTO: 2.2 % (ref 0–0.5)
IMM GRANULOCYTES NFR BLD AUTO: 3.2 % (ref 0–0.5)
LYMPHOCYTES # BLD AUTO: 1.02 10*3/MM3 (ref 0.7–3.1)
LYMPHOCYTES # BLD AUTO: 1.03 10*3/MM3 (ref 0.7–3.1)
LYMPHOCYTES NFR BLD AUTO: 13.3 % (ref 19.6–45.3)
LYMPHOCYTES NFR BLD AUTO: 14.9 % (ref 19.6–45.3)
MCH RBC QN AUTO: 26.3 PG (ref 26.6–33)
MCH RBC QN AUTO: 27.4 PG (ref 26.6–33)
MCHC RBC AUTO-ENTMCNC: 30.6 G/DL (ref 31.5–35.7)
MCHC RBC AUTO-ENTMCNC: 31.4 G/DL (ref 31.5–35.7)
MCV RBC AUTO: 86 FL (ref 79–97)
MCV RBC AUTO: 87.2 FL (ref 79–97)
MONOCYTES # BLD AUTO: 0.63 10*3/MM3 (ref 0.1–0.9)
MONOCYTES # BLD AUTO: 0.72 10*3/MM3 (ref 0.1–0.9)
MONOCYTES NFR BLD AUTO: 10.4 % (ref 5–12)
MONOCYTES NFR BLD AUTO: 8.2 % (ref 5–12)
NEUTROPHILS NFR BLD AUTO: 4.74 10*3/MM3 (ref 1.7–7)
NEUTROPHILS NFR BLD AUTO: 5.57 10*3/MM3 (ref 1.7–7)
NEUTROPHILS NFR BLD AUTO: 68.5 % (ref 42.7–76)
NEUTROPHILS NFR BLD AUTO: 72.8 % (ref 42.7–76)
NRBC BLD AUTO-RTO: 0 /100 WBC (ref 0–0.2)
NRBC BLD AUTO-RTO: 0 /100 WBC (ref 0–0.2)
PHOSPHATE SERPL-MCNC: 1.1 MG/DL (ref 2.5–4.5)
PHOSPHATE SERPL-MCNC: 2.5 MG/DL (ref 2.5–4.5)
PLATELET # BLD AUTO: 253 10*3/MM3 (ref 140–450)
PLATELET # BLD AUTO: 263 10*3/MM3 (ref 140–450)
PMV BLD AUTO: 10.3 FL (ref 6–12)
PMV BLD AUTO: 10.5 FL (ref 6–12)
POTASSIUM SERPL-SCNC: 4.3 MMOL/L (ref 3.5–5.2)
POTASSIUM SERPL-SCNC: 4.3 MMOL/L (ref 3.5–5.2)
RBC # BLD AUTO: 2.96 10*6/MM3 (ref 4.14–5.8)
RBC # BLD AUTO: 3.35 10*6/MM3 (ref 4.14–5.8)
SODIUM SERPL-SCNC: 137 MMOL/L (ref 136–145)
SODIUM SERPL-SCNC: 137 MMOL/L (ref 136–145)
WBC NRBC COR # BLD AUTO: 6.92 10*3/MM3 (ref 3.4–10.8)
WBC NRBC COR # BLD AUTO: 7.66 10*3/MM3 (ref 3.4–10.8)

## 2023-12-02 PROCEDURE — 85018 HEMOGLOBIN: CPT | Performed by: INTERNAL MEDICINE

## 2023-12-02 PROCEDURE — 99232 SBSQ HOSP IP/OBS MODERATE 35: CPT | Performed by: INTERNAL MEDICINE

## 2023-12-02 PROCEDURE — 85025 COMPLETE CBC W/AUTO DIFF WBC: CPT | Performed by: INTERNAL MEDICINE

## 2023-12-02 PROCEDURE — 85014 HEMATOCRIT: CPT | Performed by: INTERNAL MEDICINE

## 2023-12-02 PROCEDURE — 63710000001 INSULIN REGULAR HUMAN PER 5 UNITS: Performed by: INTERNAL MEDICINE

## 2023-12-02 PROCEDURE — 63710000001 INSULIN REGULAR HUMAN PER 5 UNITS: Performed by: NURSE PRACTITIONER

## 2023-12-02 PROCEDURE — 80069 RENAL FUNCTION PANEL: CPT | Performed by: INTERNAL MEDICINE

## 2023-12-02 PROCEDURE — 94799 UNLISTED PULMONARY SVC/PX: CPT

## 2023-12-02 PROCEDURE — 94761 N-INVAS EAR/PLS OXIMETRY MLT: CPT

## 2023-12-02 PROCEDURE — 83036 HEMOGLOBIN GLYCOSYLATED A1C: CPT | Performed by: STUDENT IN AN ORGANIZED HEALTH CARE EDUCATION/TRAINING PROGRAM

## 2023-12-02 PROCEDURE — 82948 REAGENT STRIP/BLOOD GLUCOSE: CPT

## 2023-12-02 PROCEDURE — 63710000001 INSULIN GLARGINE PER 5 UNITS: Performed by: STUDENT IN AN ORGANIZED HEALTH CARE EDUCATION/TRAINING PROGRAM

## 2023-12-02 PROCEDURE — 94664 DEMO&/EVAL PT USE INHALER: CPT

## 2023-12-02 RX ORDER — ROSUVASTATIN CALCIUM 40 MG/1
40 TABLET, COATED ORAL DAILY
Status: DISCONTINUED | OUTPATIENT
Start: 2023-12-02 | End: 2023-12-07

## 2023-12-02 RX ORDER — ECHINACEA PURPUREA EXTRACT 125 MG
1 TABLET ORAL AS NEEDED
Status: DISCONTINUED | OUTPATIENT
Start: 2023-12-02 | End: 2023-12-11 | Stop reason: HOSPADM

## 2023-12-02 RX ORDER — CETIRIZINE HYDROCHLORIDE 10 MG/1
10 TABLET ORAL DAILY
Status: DISCONTINUED | OUTPATIENT
Start: 2023-12-02 | End: 2023-12-11 | Stop reason: HOSPADM

## 2023-12-02 RX ORDER — INSULIN GLARGINE 100 [IU]/ML
2 INJECTION, SOLUTION SUBCUTANEOUS NIGHTLY
Status: DISCONTINUED | OUTPATIENT
Start: 2023-12-02 | End: 2023-12-11 | Stop reason: HOSPADM

## 2023-12-02 RX ORDER — CARVEDILOL 6.25 MG/1
6.25 TABLET ORAL 2 TIMES DAILY WITH MEALS
Status: DISCONTINUED | OUTPATIENT
Start: 2023-12-02 | End: 2023-12-11 | Stop reason: HOSPADM

## 2023-12-02 RX ADMIN — PANTOPRAZOLE SODIUM 40 MG: 40 INJECTION, POWDER, FOR SOLUTION INTRAVENOUS at 07:57

## 2023-12-02 RX ADMIN — INSULIN HUMAN 2 UNITS: 100 INJECTION, SOLUTION PARENTERAL at 07:56

## 2023-12-02 RX ADMIN — CARVEDILOL 6.25 MG: 6.25 TABLET, FILM COATED ORAL at 19:25

## 2023-12-02 RX ADMIN — Medication 10 ML: at 07:57

## 2023-12-02 RX ADMIN — INSULIN HUMAN 3 UNITS: 100 INJECTION, SOLUTION PARENTERAL at 20:57

## 2023-12-02 RX ADMIN — PANTOPRAZOLE SODIUM 40 MG: 40 INJECTION, POWDER, FOR SOLUTION INTRAVENOUS at 20:57

## 2023-12-02 RX ADMIN — BUDESONIDE AND FORMOTEROL FUMARATE DIHYDRATE 2 PUFF: 160; 4.5 AEROSOL RESPIRATORY (INHALATION) at 07:12

## 2023-12-02 RX ADMIN — LEVOTHYROXINE SODIUM 75 MCG: 75 TABLET ORAL at 06:26

## 2023-12-02 RX ADMIN — CETIRIZINE HYDROCHLORIDE 10 MG: 10 TABLET ORAL at 19:25

## 2023-12-02 RX ADMIN — ROSUVASTATIN CALCIUM 40 MG: 40 TABLET, FILM COATED ORAL at 19:25

## 2023-12-02 RX ADMIN — CARVEDILOL 3.12 MG: 3.12 TABLET, FILM COATED ORAL at 07:57

## 2023-12-02 RX ADMIN — SALINE NASAL SPRAY 1 SPRAY: 1.5 SOLUTION NASAL at 20:58

## 2023-12-02 RX ADMIN — SODIUM BICARBONATE 650 MG: 650 TABLET, ORALLY DISINTEGRATING ORAL at 19:25

## 2023-12-02 RX ADMIN — BUDESONIDE AND FORMOTEROL FUMARATE DIHYDRATE 2 PUFF: 160; 4.5 AEROSOL RESPIRATORY (INHALATION) at 19:31

## 2023-12-02 RX ADMIN — INSULIN GLARGINE 2 UNITS: 100 INJECTION, SOLUTION SUBCUTANEOUS at 20:57

## 2023-12-02 RX ADMIN — Medication 10 ML: at 20:57

## 2023-12-02 RX ADMIN — INSULIN HUMAN 2 UNITS: 100 INJECTION, SOLUTION PARENTERAL at 12:07

## 2023-12-02 RX ADMIN — SODIUM BICARBONATE 650 MG: 650 TABLET, ORALLY DISINTEGRATING ORAL at 07:57

## 2023-12-02 NOTE — PROGRESS NOTES
Name: Carlito Mo ADMIT: 2023   : 1955  PCP: Florencia Caballero MD    MRN: [id number] LOS: 3 days   AGE/SEX: 68 y.o. male  ROOM: [facility]     Subjective   Subjective     No events overnight. He complains of some nasal congestion. Currently off the diltiazem drip       Objective   Objective   Vital Signs  Temp:  [97.7 °F (36.5 °C)-98.6 °F (37 °C)] 97.7 °F (36.5 °C)  Heart Rate:  [60-88] 87  Resp:  [15-18] 16  BP: (114-133)/(56-96) 133/96  SpO2:  [96 %-99 %] 96 %  on   ;   Device (Oxygen Therapy): room air  Body mass index is 23.91 kg/m².  Physical Exam  Constitutional:       General: He is not in acute distress.     Appearance: He is ill-appearing. He is not toxic-appearing.   Cardiovascular:      Rate and Rhythm: Normal rate and regular rhythm.      Heart sounds: Normal heart sounds.   Pulmonary:      Effort: Pulmonary effort is normal.      Breath sounds: Normal breath sounds.   Abdominal:      General: Bowel sounds are normal.      Palpations: Abdomen is soft.   Musculoskeletal:         General: No tenderness.      Right lower leg: No edema.      Left lower leg: No edema.   Neurological:      Mental Status: He is alert.   Psychiatric:         Mood and Affect: Mood normal.         Behavior: Behavior normal.         Results Review     I reviewed the patient's new clinical results.  Results from last 7 days   Lab Units 23  0320 23  0004 23  1547 23  0903 23  0226 23  0040 23  1622 23  0756   WBC 10*3/mm3 6.92  --   --  6.62  --  7.40  --  5.51   HEMOGLOBIN g/dL 8.1* 8.1* 8.4* 8.6*   < > 9.3*  9.3*   < > 10.0*  10.0*   PLATELETS 10*3/mm3 253  --   --  260  --  235  --  220    < > = values in this interval not displayed.     Results from last 7 days   Lab Units 23  0320 23  0903 23  0040 23  0756   SODIUM mmol/L 137 134* 137 138   POTASSIUM mmol/L 4.3 4.5 3.5 3.2*   CHLORIDE mmol/L 105 104 99 101   CO2 mmol/L 24.4 22.0 24.3 26.3    BUN mg/dL 33* 27* 44* 36*   CREATININE mg/dL 3.14* 2.92* 3.83* 3.24*   GLUCOSE mg/dL 238* 173* 133* 133*   Estimated Creatinine Clearance: 21.4 mL/min (A) (by C-G formula based on SCr of 3.14 mg/dL (H)).  Results from last 7 days   Lab Units 12/02/23 0320 12/01/23 0903 11/30/23 0040 11/29/23 0756 11/28/23  2119   ALBUMIN g/dL 2.8* 3.0* 3.0* 3.2* 3.9   BILIRUBIN mg/dL  --   --   --  0.3 0.4   ALK PHOS U/L  --   --   --  66 81   AST (SGOT) U/L  --   --   --  19 27   ALT (SGPT) U/L  --   --   --  23 31     Results from last 7 days   Lab Units 12/02/23 0320 12/01/23 0903 11/30/23 0040 11/29/23 0756 11/28/23  2119   CALCIUM mg/dL 8.3* 8.3* 8.6 8.8 9.5   ALBUMIN g/dL 2.8* 3.0* 3.0* 3.2* 3.9   MAGNESIUM mg/dL  --   --  2.0  --  2.0   PHOSPHORUS mg/dL 2.5 1.5* 3.0  --   --      Results from last 7 days   Lab Units 11/29/23 0026 11/28/23  2120   LACTATE mmol/L 1.9 3.5*     COVID19   Date Value Ref Range Status   05/02/2023 Not Detected Not Detected - Ref. Range Final   03/07/2023 Not Detected Not Detected - Ref. Range Final   12/01/2022 Detected (C) Not Detected - Ref. Range Final   07/24/2022 Not Detected Not Detected - Ref. Range Final   06/04/2022 Not Detected Not Detected - Ref. Range Final     Hemoglobin A1C   Date/Time Value Ref Range Status   12/02/2023 0320 6.90 (H) 4.80 - 5.60 % Final     Glucose   Date/Time Value Ref Range Status   12/02/2023 1126 156 (H) 70 - 130 mg/dL Final   12/02/2023 0612 174 (H) 70 - 130 mg/dL Final   12/01/2023 2057 312 (H) 70 - 130 mg/dL Final   12/01/2023 1635 117 70 - 130 mg/dL Final   12/01/2023 1117 180 (H) 70 - 130 mg/dL Final   12/01/2023 0629 127 70 - 130 mg/dL Final   11/30/2023 2335 182 (H) 70 - 130 mg/dL Final       XR Chest 1 View  Narrative: SINGLE VIEW OF THE CHEST     HISTORY: Shortness of breath     COMPARISON: November 2030 is 23     FINDINGS:  The patient status post median sternotomy with coronary artery bypass  grafting. There is cardiomegaly. There is  calcification of the aorta. No  pneumothorax or pleural effusion is seen. No definite acute infiltrates  are identified.     Impression: No acute findings.     This report was finalized on 11/28/2023 9:51 PM by Dr. Alice Allen M.D on Workstation: [id number]       Scheduled Medications  budesonide-formoterol, 2 puff, Inhalation, BID - RT  carvedilol, 3.125 mg, Oral, BID With Meals  cetirizine, 10 mg, Oral, Daily  insulin glargine, 2 Units, Subcutaneous, Nightly  insulin regular, 2-7 Units, Subcutaneous, Q6H  levothyroxine, 75 mcg, Oral, Q AM  pantoprazole, 40 mg, Intravenous, BID  rosuvastatin, 40 mg, Oral, Daily  sodium bicarbonate, 650 mg, Oral, TID  sodium chloride, 500 mL, Intravenous, Once  sodium chloride, 10 mL, Intravenous, Q12H  sodium chloride, 10 mL, Intravenous, Q12H    Infusions  dilTIAZem, 5-15 mg/hr, Last Rate: Stopped (12/01/23 2200)    Diet  Diet: Cardiac Diets, Diabetic Diets; Healthy Heart (2-3 Na+); Consistent Carbohydrate; Texture: Regular Texture (IDDSI 7); Fluid Consistency: Thin (IDDSI 0)       Assessment/Plan     Active Hospital Problems    Diagnosis  POA    **Atrial fibrillation with rapid ventricular response [I48.91]  Yes    ESRD (end stage renal disease) [N18.6]  Yes    Nausea & vomiting [R11.2]  Yes    Hemoptysis [R04.2]  Yes    Embolic stroke [I63.9]  Yes    Coronary artery disease involving native coronary artery of native heart without angina pectoris [I25.10]  Yes    Chronic combined systolic and diastolic CHF (congestive heart failure) [I50.42]  Yes    Acquired hypothyroidism [E03.9]  Yes    Benign prostatic hyperplasia with nocturia [N40.1, R35.1]  Yes    Type 2 diabetes mellitus, with long-term current use of insulin [E11.9, Z79.4]  Not Applicable      Resolved Hospital Problems   No resolved problems to display.       68 y.o. male admitted with Atrial fibrillation with rapid ventricular response.    GI bleeding/coffee ground emesis/diarrhea-thought to be secondary to  viral gastroenteritis with perhaps a small giancarlo-lr tear. eliquis on hold. On iv ppi bid. GI is planning EGD for Monday  Afib with rvr-s/p diltiazem drip. On low dose coreg. AC held as above  Cardiogenic hypotension due to afib-improved  Elevated troponin due to ESRD and type 2 NSTEMI from rvr-no plans for ischemic workup at this time.  Metabolic encephalopathy-resolved  Coronary artery disease s/p CABG in 2001-statin/bb. Asa on hold  Chronic combined systolic and diastolic heart failure-bb. Torsemide is held. euvolemic  ESRD on HD T, TH, Sa-nephrology is managing dialysis  Anemia of chronic disease-hgb is 8.1. on HAI as an outpatient  Type 2 diabetes-A1c 6.9. glucose was a little uncontrolled yesterday afternoon. He is on a very low dose of glargine as an outpatient, which I will restart. Continue sliding scale.  COPD with chronic respiratory failure with hypoxia on home O2-symbicort  Hypothyroidism-synthroid  History of embolic CVA-eliquis on hold as above  Nasal congestion-will add some zyrtec and saline nasal spray  SCDs for DVT prophylaxis.  Full code.  Discussed with patient.  Anticipate discharge to SNU facility timing yet to be determined.      Delta Marie MD  [facility]  12/02/23  13:44 EST    I wore protective equipment throughout this patient encounter including a face mask, gloves and protective eyewear.  Hand hygiene was performed before donning protective equipment and after removal when leaving the room.

## 2023-12-02 NOTE — PROGRESS NOTES
Gastroenterology   Inpatient Progress Note    Reason for Follow Up: Coffee-ground emesis      Subjective     Interval History:   Patient denies any further episodes of emesis.    Current Facility-Administered Medications:     acetaminophen (TYLENOL) tablet 650 mg, 650 mg, Oral, Q4H PRN **OR** acetaminophen (TYLENOL) 160 MG/5ML oral solution 650 mg, 650 mg, Oral, Q4H PRN **OR** acetaminophen (TYLENOL) suppository 650 mg, 650 mg, Rectal, Q4H PRN, Bernardo Amaya MD    acetaminophen (TYLENOL) tablet 650 mg, 650 mg, Oral, Q4H PRN **OR** acetaminophen (TYLENOL) suppository 650 mg, 650 mg, Rectal, Q4H PRN, Bernardo Amaya MD    budesonide-formoterol (SYMBICORT) 160-4.5 MCG/ACT inhaler 2 puff, 2 puff, Inhalation, BID - RT, Bernardo Amaya MD, 2 puff at 12/02/23 0712    calcium carbonate (TUMS) chewable tablet 500 mg (200 mg elemental), 2 tablet, Oral, BID PRN, Bernardo Amaya MD    carvedilol (COREG) tablet 3.125 mg, 3.125 mg, Oral, BID With Meals, Mauricio Corona MD, 3.125 mg at 12/02/23 0757    dextrose (D50W) (25 g/50 mL) IV injection 25 g, 25 g, Intravenous, Q15 Min PRN, Bernardo Amaya MD    dextrose (D50W) (25 g/50 mL) IV injection 25 g, 25 g, Intravenous, Q15 Min PRN, Bernardo Amaya MD    dextrose (GLUTOSE) oral gel 15 g, 15 g, Oral, Q15 Min PRN, Bernardo Amaya MD    dilTIAZem (CARDIZEM) 100 mg in 100 mL NS infusion (ADV), 5-15 mg/hr, Intravenous, Titrated, Bernardo Amaya MD, Held at 12/01/23 2200    glucagon (GLUCAGEN) injection 1 mg, 1 mg, Intramuscular, Q15 Min PRN, Bernardo Amaya MD    insulin regular (humuLIN R,novoLIN R) injection 2-7 Units, 2-7 Units, Subcutaneous, Q6H, Bernardo Amaya MD, 2 Units at 12/02/23 0756    ipratropium-albuterol (DUO-NEB) nebulizer solution 3 mL, 3 mL, Nebulization, Q6H PRN, Bernardo Amaya MD    levothyroxine (SYNTHROID, LEVOTHROID) tablet 75 mcg, 75 mcg, Oral, Q AM, Bernardo Amaya MD, 75 mcg at 12/02/23 0626    nitroglycerin (NITROSTAT) SL tablet 0.4 mg, 0.4  mg, Sublingual, Q5 Min PRN, Bernardo Amaya MD    ondansetron (ZOFRAN) tablet 4 mg, 4 mg, Oral, Q6H PRN **OR** ondansetron (ZOFRAN) injection 4 mg, 4 mg, Intravenous, Q6H PRN, Bernardo Amaya MD    pantoprazole (PROTONIX) injection 40 mg, 40 mg, Intravenous, BID, Amber Person, APRN, 40 mg at 12/02/23 [facility]7    sodium bicarbonate tablet 650 mg, 650 mg, Oral, TID, Bernardo Amaya MD, 650 mg at 12/02/23 0757    sodium chloride 0.9 % bolus 500 mL, 500 mL, Intravenous, Once, Bernardo Amaya MD    sodium chloride 0.9 % flush 10 mL, 10 mL, Intravenous, PRN, Bernardo Amaya MD    Insert Peripheral IV, , , Once **AND** sodium chloride 0.9 % flush 10 mL, 10 mL, Intravenous, PRN, Bernardo Amaya MD    sodium chloride 0.9 % flush 10 mL, 10 mL, Intravenous, Q12H, Bernardo Amaya MD, 10 mL at 12/02/23 0757    sodium chloride 0.9 % flush 10 mL, 10 mL, Intravenous, PRN, Bernardo Amaya MD    sodium chloride 0.9 % flush 10 mL, 10 mL, Intravenous, Q12H, Bernardo Amaya MD, 10 mL at 12/02/23 0757    sodium chloride 0.9 % flush 10 mL, 10 mL, Intravenous, PRN, Bernardo Amaya MD    sodium chloride 0.9 % infusion 40 mL, 40 mL, Intravenous, PRN, Bernardo Amaya MD    sodium chloride 0.9 % infusion 40 mL, 40 mL, Intravenous, PRNMonique Ervin H., MD  Review of Systems:    The following systems were reviewed and negative;  gastrointestinal    Objective     Vital Signs  Temp:  [97.7 °F (36.5 °C)-98.6 °F (37 °C)] 97.7 °F (36.5 °C)  Heart Rate:  [60-88] 87  Resp:  [15-18] 16  BP: (114-133)/(56-96) 133/96  Body mass index is 23.91 kg/m².  No intake or output data in the 24 hours ending 12/02/23 0926  No intake/output data recorded.     Physical Exam:   General: patient awake, alert and cooperative   Eyes: no scleral icterus   Skin: warm and dry, not jaundiced   Abdomen: soft, nontender, nondistended; normal bowel sounds, no masses palpated, no periumbical lymphadenopathy   Psychiatric: Appropriate affect and behavior     Results  Review:     I reviewed the patient's new clinical results.    Results from last 7 days   Lab Units 12/02/23  0320 12/02/23  0004 12/01/23  1547 12/01/23 0903 11/30/23  0226 11/30/23  0040   WBC 10*3/mm3 6.92  --   --  6.62  --  7.40   HEMOGLOBIN g/dL 8.1* 8.1* 8.4* 8.6*   < > 9.3*  9.3*   HEMATOCRIT % 25.8* 26.0* 26.3* 28.3*   < > 28.6*  28.6*   PLATELETS 10*3/mm3 253  --   --  260  --  235    < > = values in this interval not displayed.     Results from last 7 days   Lab Units 12/02/23 0320 12/01/23  0903 11/30/23 0040 11/29/23 0756 11/28/23 2119   SODIUM mmol/L 137 134* 137 138 140   POTASSIUM mmol/L 4.3 4.5 3.5 3.2* 4.0   CHLORIDE mmol/L 105 104 99 101 97*   CO2 mmol/L 24.4 22.0 24.3 26.3 26.3   BUN mg/dL 33* 27* 44* 36* 28*   CREATININE mg/dL 3.14* 2.92* 3.83* 3.24* 2.74*   CALCIUM mg/dL 8.3* 8.3* 8.6 8.8 9.5   BILIRUBIN mg/dL  --   --   --  0.3 0.4   ALK PHOS U/L  --   --   --  66 81   ALT (SGPT) U/L  --   --   --  23 31   AST (SGOT) U/L  --   --   --  19 27   GLUCOSE mg/dL 238* 173* 133* 133* 243*     Results from last 7 days   Lab Units 11/28/23 2119   INR  1.18*     Lab Results   Lab Value Date/Time    LIPASE 27 11/28/2023 2119    LIPASE 31 11/24/2023 1836    LIPASE 33 12/13/2021 1614    LIPASE 58 11/30/2017 2155    LIPASE 30 08/27/2016 1627    LIPASE 30 02/14/2016 0635       Radiology:  XR Chest 1 View   Final Result   No acute findings.       This report was finalized on 11/28/2023 9:51 PM by Dr. Alice Allen M.D on Workstation: [id number]              Assessment & Plan     Active Hospital Problems    Diagnosis     **Atrial fibrillation with rapid ventricular response     ESRD (end stage renal disease)     Nausea & vomiting     Hemoptysis     Embolic stroke     Coronary artery disease involving native coronary artery of native heart without angina pectoris     Chronic combined systolic and diastolic CHF (congestive heart failure)     Acquired hypothyroidism     Benign prostatic hyperplasia  with nocturia     Type 2 diabetes mellitus, with long-term current use of insulin      These problems are new to me  Assessment:  Coffee-ground emesis  Mild anemia.  Nausea and vomiting.  Resolved.  End-stage renal disease on hemodialysis  Evaded troponin level with cardiology following.      Plan:  Plan EGD on 12/4.  Continue PPI  Monitor H&H  I discussed the patients findings and my recommendations with patient and nursing staff.    MD Quoc Smith M.D.  [facility] [place] [address]  Office: [phone]

## 2023-12-02 NOTE — NURSING NOTE
HD WITHOUT INCIDENT OR C/O. TOLERATED WELL. REMOVED 400 ML'S NET LOSS. NO MEDS ADMINISTERED. AVF NEEDLES REMOVED X 2. HEMOSTASIS ACHIEVED. STABLE, NO C/O POST COMPLETION OF HD.

## 2023-12-02 NOTE — PROGRESS NOTES
[facility] Progress Note      Patient Name: Carlito Mo  : 1955  MRN: [id number]  Primary Care Physician:  Florencia Caballero MD  Date of admission: 2023    Subjective     Interval History:   Follow-up end-stage renal disease  Patient is feeling much better today, denies any chest pain or shortness of air, no orthopnea or PND, no nausea or vomiting, he is off diltiazem drip    Review of Systems:   As noted above    Objective     Vitals:   Temp:  [97.5 °F (36.4 °C)-98.6 °F (37 °C)] 97.5 °F (36.4 °C)  Heart Rate:  [67-98] 98  Resp:  [15-18] 18  BP: (114-139)/(56-96) 139/79  No intake or output data in the 24 hours ending 23 1416      Physical Exam:    General Appearance: alert, awake, chronically ill, no acute distress   Skin: warm and dry  HEENT: oral mucosa normal, nonicteric sclera  Neck: supple, no JVD  Lungs: Bilateral rhonchi, breathing effort not labored  Heart: Irregularly irregular, no rub  Abdomen: soft, nontender, nondistended  : no palpable bladder  Extremities: no edema, cyanosis or clubbing, functional AV fistula in the right forearm with good thrill and bruit  Neuro: More awake and alert moving all extremities        Scheduled Meds:     budesonide-formoterol, 2 puff, Inhalation, BID - RT  carvedilol, 3.125 mg, Oral, BID With Meals  cetirizine, 10 mg, Oral, Daily  insulin glargine, 2 Units, Subcutaneous, Nightly  insulin regular, 2-7 Units, Subcutaneous, Q6H  levothyroxine, 75 mcg, Oral, Q AM  pantoprazole, 40 mg, Intravenous, BID  rosuvastatin, 40 mg, Oral, Daily  sodium bicarbonate, 650 mg, Oral, TID  sodium chloride, 500 mL, Intravenous, Once  sodium chloride, 10 mL, Intravenous, Q12H  sodium chloride, 10 mL, Intravenous, Q12H      IV Meds:   dilTIAZem, 5-15 mg/hr, Last Rate: Stopped (23 2200)        Results Reviewed:   I have personally reviewed the results from the time of this admission to 2023 14:16 EST     Results from last 7 days    Lab Units 12/02/23  0320 12/01/23  0903 11/30/23  0040 11/29/23  0756 11/28/23  2119   SODIUM mmol/L 137 134* 137 138 140   POTASSIUM mmol/L 4.3 4.5 3.5 3.2* 4.0   CHLORIDE mmol/L 105 104 99 101 97*   CO2 mmol/L 24.4 22.0 24.3 26.3 26.3   BUN mg/dL 33* 27* 44* 36* 28*   CREATININE mg/dL 3.14* 2.92* 3.83* 3.24* 2.74*   CALCIUM mg/dL 8.3* 8.3* 8.6 8.8 9.5   BILIRUBIN mg/dL  --   --   --  0.3 0.4   ALK PHOS U/L  --   --   --  66 81   ALT (SGPT) U/L  --   --   --  23 31   AST (SGOT) U/L  --   --   --  19 27   GLUCOSE mg/dL 238* 173* 133* 133* 243*       Estimated Creatinine Clearance: 21.4 mL/min (A) (by C-G formula based on SCr of 3.14 mg/dL (H)).    Results from last 7 days   Lab Units 12/02/23  0320 12/01/23 0903 11/30/23  0040 11/28/23  2119   MAGNESIUM mg/dL  --   --  2.0 2.0   PHOSPHORUS mg/dL 2.5 1.5* 3.0  --              Results from last 7 days   Lab Units 12/02/23  0320 12/02/23  0004 12/01/23  1547 12/01/23  0903 12/01/23  0017 11/30/23  0226 11/30/23  0040 11/29/23  1622 11/29/23  0756 11/29/23  0338   WBC 10*3/mm3 6.92  --   --  6.62  --   --  7.40  --  5.51 5.39   HEMOGLOBIN g/dL 8.1* 8.1* 8.4* 8.6* 8.9*   < > 9.3*  9.3*   < > 10.0*  10.0* 10.1*   PLATELETS 10*3/mm3 253  --   --  260  --   --  235  --  220 176    < > = values in this interval not displayed.       Results from last 7 days   Lab Units 11/28/23 2119   INR  1.18*       Assessment / Plan     ASSESSMENT:  End-stage renal disease and diabetic nephropathy on maintenance hemodialysis every Tuesday, Thursday and Saturday,He appears to be euvolemic, potassium is 4.3, plan for dialysis today  hypokalemia resolved  Atrial fibrillation with controlled ventricular rate currently off diltiazem  Diabetes mellitus type 2 with renal complication  Hypertension with chronic kidney disease, well controlled  Anemia of chronic kidney disease, hemoglobin today is 8.1 treated with long-acting HAI as an outpatient  Altered mental status.  Resolved  History of  coronary artery disease and prior CVA.       PLAN:  Continue the same treatment  Hemodialysis today  Surveillance labs    I reviewed the chart and other providers notes, I reviewed labs  I discussed the case with the patient.  Copied text in this note has been reviewed and is accurate as of 12/02/23.       Thank you for involving us in the care of Carlito Mo.  Please feel free to call with any questions.    Cameron Olguin MD  12/02/23  14:16 [facility]    [facility]  [phone]    Please note that portions of this note were completed with a voice recognition program.

## 2023-12-02 NOTE — PROGRESS NOTES
LOS: 3 days   Patient Care Team:  Florencia Caballero MD as PCP - General (Internal Medicine)  Amber Murguia MD as Consulting Physician (Cardiology)  Sera La [facility] as Pharmacist  Seth Ladd MD as Surgeon (General Surgery)  Kim Hoyos MD PhD as Consulting Physician (Hematology and Oncology)  Jerome Diallo MD as Referring Physician (Family Medicine)  Jose Enrique Louie MD as Consulting Physician (Gastroenterology)  Nima Huddleston MD as Consulting Physician (Nephrology)    Chief Complaint: Follow-up coffee-ground emesis, chronic combined CHF, paroxysmal atrial fibrillation with RVR.    Interval History: He is doing better today in general.  His atrial fibrillation rate is better controlled.  He is still on 5 mg/h on a diltiazem drip.  No chest pain.  No further coffee-ground emesis.    Vital Signs:  Temp:  [97.7 °F (36.5 °C)-98.6 °F (37 °C)] 97.7 °F (36.5 °C)  Heart Rate:  [60-88] 87  Resp:  [15-18] 16  BP: (114-133)/(56-96) 133/96  No intake or output data in the 24 hours ending 12/02/23 1255      Physical Exam:   General Appearance:    No acute distress, alert and oriented x4, pale, chronically ill-appearing.   Lungs:     Clear to auscultation bilaterally     Heart:    Irregularly irregular with a normal rate.  No murmurs, gallops, or rubs noted.    Abdomen:     Soft, nontender, nondistended.    Extremities:   No clubbing, cyanosis, or edema.     Results Review:    Results from last 7 days   Lab Units 12/02/23  0320   SODIUM mmol/L 137   POTASSIUM mmol/L 4.3   CHLORIDE mmol/L 105   CO2 mmol/L 24.4   BUN mg/dL 33*   CREATININE mg/dL 3.14*   GLUCOSE mg/dL 238*   CALCIUM mg/dL 8.3*     Results from last 7 days   Lab Units 11/30/23  0226 11/30/23  0040 11/29/23  0756   HSTROP T ng/L 170* 173* 155*     Results from last 7 days   Lab Units 12/02/23  0320   WBC 10*3/mm3 6.92   HEMOGLOBIN g/dL 8.1*   HEMATOCRIT % 25.8*   PLATELETS 10*3/mm3 253     Results from last 7 days   Lab Units  11/28/23  2119   INR  1.18*         Results from last 7 days   Lab Units 11/30/23  0040   MAGNESIUM mg/dL 2.0           I reviewed the patient's new clinical results.        Assessment:  1.  Recent vomiting and diarrhea, possibly viral gastroenteritis  2.  Coffee-ground emesis  3.  Paroxysmal atrial fibrillation with RVR  4.  Chronic combined CHF  5.  Cardiomyopathy, EF 40 to 45% on 3/8/2023  6.  Coronary artery disease, status post CABG in 2001  7.  History of embolic CVA, on chronic anticoagulation  8.  End-stage renal disease, on hemodialysis  9.  Altered mental status, likely multifactorial  10.  Anemia of chronic disease  11.  COPD without acute exacerbation (on home O2)  12.  Right bundle branch block, chronic  13.  Elevated troponin secondary to renal disease and type II NSTEMI secondary to #1 and #3  14.  Diabetes    Plan:    Vital signs are normal  Hemoglobin 8.1  Continue Coreg will increase the dose to 6.25 twice a day    Gonsalo Lyman MD  12/02/23  12:55 EST

## 2023-12-02 NOTE — PLAN OF CARE
Problem: Adjustment to Illness (Sepsis/Septic Shock)  Goal: Optimal Coping  Outcome: Ongoing, Progressing     Problem: Infection Progression (Sepsis/Septic Shock)  Goal: Absence of Infection Signs and Symptoms  Outcome: Ongoing, Progressing     Problem: Nutrition Impaired (Sepsis/Septic Shock)  Goal: Optimal Nutrition Intake  Outcome: Ongoing, Progressing     Problem: Skin Injury Risk Increased  Goal: Skin Health and Integrity  Outcome: Ongoing, Progressing  Intervention: Optimize Skin Protection  Recent Flowsheet Documentation  Taken 12/2/2023 1315 by Jose Enrique Daniel RN  Pressure Reduction Techniques:   frequent weight shift encouraged   weight shift assistance provided  Head of Bed (HOB) Positioning: HOB at 30 degrees  Pressure Reduction Devices: pressure-redistributing mattress utilized  Skin Protection:   tubing/devices free from skin contact   incontinence pads utilized  Taken 12/2/2023 1200 by Jose Enrique Daniel RN  Head of Bed (HOB) Positioning: HOB at 20-30 degrees  Taken 12/2/2023 1000 by Jos eEnrique Daniel RN  Head of Bed (HOB) Positioning: HOB at 30 degrees  Taken 12/2/2023 0747 by Jose Enrique Daniel RN  Pressure Reduction Techniques:   frequent weight shift encouraged   weight shift assistance provided  Head of Bed (HOB) Positioning: HOB at 20 degrees  Pressure Reduction Devices: pressure-redistributing mattress utilized  Skin Protection:   tubing/devices free from skin contact   incontinence pads utilized     Problem: Fall Injury Risk  Goal: Absence of Fall and Fall-Related Injury  Outcome: Ongoing, Progressing  Intervention: Promote Injury-Free Environment  Recent Flowsheet Documentation  Taken 12/2/2023 1315 by Jose Enrique Daniel RN  Safety Promotion/Fall Prevention:   assistive device/personal items within reach   fall prevention program maintained   nonskid shoes/slippers when out of bed   safety round/check completed  Taken 12/2/2023 1200 by Jose Enrique Daniel RN  Safety Promotion/Fall Prevention:    assistive device/personal items within reach   fall prevention program maintained   nonskid shoes/slippers when out of bed   safety round/check completed  Taken 12/2/2023 1000 by Jose Enrique Daniel RN  Safety Promotion/Fall Prevention:   assistive device/personal items within reach   fall prevention program maintained   nonskid shoes/slippers when out of bed   safety round/check completed  Taken 12/2/2023 0747 by Jose Enrique Daniel RN  Safety Promotion/Fall Prevention:   assistive device/personal items within reach   fall prevention program maintained   nonskid shoes/slippers when out of bed   safety round/check completed     Problem: Device-Related Complication Risk (Hemodialysis)  Goal: Safe, Effective Therapy Delivery  Outcome: Ongoing, Progressing     Problem: Hemodynamic Instability (Hemodialysis)  Goal: Effective Tissue Perfusion  Outcome: Ongoing, Progressing     Problem: Infection (Hemodialysis)  Goal: Absence of Infection Signs and Symptoms  Outcome: Ongoing, Progressing     Problem: Dysrhythmia  Goal: Normalized Cardiac Rhythm  Outcome: Ongoing, Progressing     Problem: Adult Inpatient Plan of Care  Goal: Plan of Care Review  Outcome: Ongoing, Progressing  Flowsheets (Taken 12/2/2023 1405)  Progress: improving  Plan of Care Reviewed With: patient  Outcome Evaluation: Paytient has been pleasant and cooperative during shift. No complaints of pain, nausea or SOA. AOx4, assist x2/turn, room air, afib. Still holding cardizem gtt put heart rate is slowly going back up to . Hemodialysis scheduled for today. Will continue to monitor and assist patient as needed.  Goal: Patient-Specific Goal (Individualized)  Outcome: Ongoing, Progressing  Goal: Absence of Hospital-Acquired Illness or Injury  Outcome: Ongoing, Progressing  Intervention: Identify and Manage Fall Risk  Recent Flowsheet Documentation  Taken 12/2/2023 1315 by Jose Enrique Daniel, RN  Safety Promotion/Fall Prevention:   assistive device/personal items  within [facility]   fall prevention program maintained   nonskid shoes/slippers when out of bed   safety round/check completed  Taken 12/2/2023 1200 by Jose Enrique Daniel RN  Safety Promotion/Fall Prevention:   assistive device/personal items within reach   fall prevention program maintained   nonskid shoes/slippers when out of bed   safety round/check completed  Taken 12/2/2023 1000 by Jose Enrique Daniel RN  Safety Promotion/Fall Prevention:   assistive device/personal items within reach   fall prevention program maintained   nonskid shoes/slippers when out of bed   safety round/check completed  Taken 12/2/2023 0747 by Jose Enrique Daniel RN  Safety Promotion/Fall Prevention:   assistive device/personal items within reach   fall prevention program maintained   nonskid shoes/slippers when out of bed   safety round/check completed  Intervention: Prevent Skin Injury  Recent Flowsheet Documentation  Taken 12/2/2023 1315 by Jose Enrique Daniel RN  Body Position:   right   turned  Skin Protection:   tubing/devices free from skin contact   incontinence pads utilized  Taken 12/2/2023 1200 by Jose Enrique Daniel RN  Body Position:   right   turned  Taken 12/2/2023 1000 by Jose Enrique Daniel RN  Body Position:   left   turned  Taken 12/2/2023 0747 by Jose Enrique Daniel RN  Body Position: supine  Skin Protection:   tubing/devices free from skin contact   incontinence pads utilized  Goal: Optimal Comfort and Wellbeing  Outcome: Ongoing, Progressing  Goal: Readiness for Transition of Care  Outcome: Ongoing, Progressing  Intervention: Mutually Develop Transition Plan  Recent Flowsheet Documentation  Taken 12/2/2023 1016 by Jose Enrique Daniel RN  Transportation Anticipated: family or friend will provide  Patient/Family Anticipated Services at Transition: skilled nursing  Patient/Family Anticipates Transition to: inpatient rehabilitation facility  Taken 12/2/2023 1015 by Jose Enrique Daniel RN  Equipment Currently Used at Home:   glucometer    walker, rolling   Goal Outcome Evaluation:  Plan of Care Reviewed With: patient        Progress: improving  Outcome Evaluation: Paytient has been pleasant and cooperative during shift. No complaints of pain, nausea or SOA. AOx4, assist x2/turn, room air, afib. Still holding cardizem gtt put heart rate is slowly going back up to . Hemodialysis scheduled for today. Will continue to monitor and assist patient as needed.

## 2023-12-03 PROBLEM — K92.0 COFFEE GROUND EMESIS: Status: ACTIVE | Noted: 2023-11-28

## 2023-12-03 LAB
ANION GAP SERPL CALCULATED.3IONS-SCNC: 4.7 MMOL/L (ref 5–15)
BUN SERPL-MCNC: 21 MG/DL (ref 8–23)
BUN/CREAT SERPL: 9.4 (ref 7–25)
CALCIUM SPEC-SCNC: 8.4 MG/DL (ref 8.6–10.5)
CHLORIDE SERPL-SCNC: 106 MMOL/L (ref 98–107)
CO2 SERPL-SCNC: 26.3 MMOL/L (ref 22–29)
CREAT SERPL-MCNC: 2.24 MG/DL (ref 0.76–1.27)
DEPRECATED RDW RBC AUTO: 42.6 FL (ref 37–54)
EGFRCR SERPLBLD CKD-EPI 2021: 31.1 ML/MIN/1.73
ERYTHROCYTE [DISTWIDTH] IN BLOOD BY AUTOMATED COUNT: 13.6 % (ref 12.3–15.4)
GLUCOSE BLDC GLUCOMTR-MCNC: 117 MG/DL (ref 70–130)
GLUCOSE BLDC GLUCOMTR-MCNC: 126 MG/DL (ref 70–130)
GLUCOSE BLDC GLUCOMTR-MCNC: 226 MG/DL (ref 70–130)
GLUCOSE BLDC GLUCOMTR-MCNC: 95 MG/DL (ref 70–130)
GLUCOSE SERPL-MCNC: 92 MG/DL (ref 65–99)
HCT VFR BLD AUTO: 26 % (ref 37.5–51)
HCT VFR BLD AUTO: 27 % (ref 37.5–51)
HGB BLD-MCNC: 8.1 G/DL (ref 13–17.7)
HGB BLD-MCNC: 8.6 G/DL (ref 13–17.7)
MCH RBC QN AUTO: 27.8 PG (ref 26.6–33)
MCHC RBC AUTO-ENTMCNC: 31.9 G/DL (ref 31.5–35.7)
MCV RBC AUTO: 87.4 FL (ref 79–97)
PLATELET # BLD AUTO: 257 10*3/MM3 (ref 140–450)
PMV BLD AUTO: 10.1 FL (ref 6–12)
POTASSIUM SERPL-SCNC: 4.6 MMOL/L (ref 3.5–5.2)
RBC # BLD AUTO: 3.09 10*6/MM3 (ref 4.14–5.8)
SODIUM SERPL-SCNC: 137 MMOL/L (ref 136–145)
WBC NRBC COR # BLD AUTO: 11.8 10*3/MM3 (ref 3.4–10.8)

## 2023-12-03 PROCEDURE — 80048 BASIC METABOLIC PNL TOTAL CA: CPT | Performed by: STUDENT IN AN ORGANIZED HEALTH CARE EDUCATION/TRAINING PROGRAM

## 2023-12-03 PROCEDURE — 63710000001 INSULIN GLARGINE PER 5 UNITS: Performed by: STUDENT IN AN ORGANIZED HEALTH CARE EDUCATION/TRAINING PROGRAM

## 2023-12-03 PROCEDURE — 94761 N-INVAS EAR/PLS OXIMETRY MLT: CPT

## 2023-12-03 PROCEDURE — 63710000001 INSULIN REGULAR HUMAN PER 5 UNITS: Performed by: NURSE PRACTITIONER

## 2023-12-03 PROCEDURE — 94799 UNLISTED PULMONARY SVC/PX: CPT

## 2023-12-03 PROCEDURE — 85018 HEMOGLOBIN: CPT | Performed by: INTERNAL MEDICINE

## 2023-12-03 PROCEDURE — 85027 COMPLETE CBC AUTOMATED: CPT | Performed by: STUDENT IN AN ORGANIZED HEALTH CARE EDUCATION/TRAINING PROGRAM

## 2023-12-03 PROCEDURE — 99232 SBSQ HOSP IP/OBS MODERATE 35: CPT | Performed by: INTERNAL MEDICINE

## 2023-12-03 PROCEDURE — 85014 HEMATOCRIT: CPT | Performed by: INTERNAL MEDICINE

## 2023-12-03 PROCEDURE — 94664 DEMO&/EVAL PT USE INHALER: CPT

## 2023-12-03 PROCEDURE — 82948 REAGENT STRIP/BLOOD GLUCOSE: CPT

## 2023-12-03 PROCEDURE — 94760 N-INVAS EAR/PLS OXIMETRY 1: CPT

## 2023-12-03 RX ADMIN — PANTOPRAZOLE SODIUM 40 MG: 40 INJECTION, POWDER, FOR SOLUTION INTRAVENOUS at 20:17

## 2023-12-03 RX ADMIN — BUDESONIDE AND FORMOTEROL FUMARATE DIHYDRATE 2 PUFF: 160; 4.5 AEROSOL RESPIRATORY (INHALATION) at 19:36

## 2023-12-03 RX ADMIN — PANTOPRAZOLE SODIUM 40 MG: 40 INJECTION, POWDER, FOR SOLUTION INTRAVENOUS at 09:03

## 2023-12-03 RX ADMIN — SODIUM BICARBONATE 650 MG: 650 TABLET, ORALLY DISINTEGRATING ORAL at 20:17

## 2023-12-03 RX ADMIN — CARVEDILOL 6.25 MG: 6.25 TABLET, FILM COATED ORAL at 18:23

## 2023-12-03 RX ADMIN — BUDESONIDE AND FORMOTEROL FUMARATE DIHYDRATE 2 PUFF: 160; 4.5 AEROSOL RESPIRATORY (INHALATION) at 07:13

## 2023-12-03 RX ADMIN — Medication 10 ML: at 20:17

## 2023-12-03 RX ADMIN — CARVEDILOL 6.25 MG: 6.25 TABLET, FILM COATED ORAL at 09:04

## 2023-12-03 RX ADMIN — LEVOTHYROXINE SODIUM 75 MCG: 75 TABLET ORAL at 06:25

## 2023-12-03 RX ADMIN — SODIUM BICARBONATE 650 MG: 650 TABLET, ORALLY DISINTEGRATING ORAL at 18:23

## 2023-12-03 RX ADMIN — SODIUM BICARBONATE 650 MG: 650 TABLET, ORALLY DISINTEGRATING ORAL at 09:04

## 2023-12-03 RX ADMIN — INSULIN GLARGINE 2 UNITS: 100 INJECTION, SOLUTION SUBCUTANEOUS at 21:21

## 2023-12-03 RX ADMIN — CETIRIZINE HYDROCHLORIDE 10 MG: 10 TABLET ORAL at 09:04

## 2023-12-03 RX ADMIN — Medication 10 ML: at 09:07

## 2023-12-03 RX ADMIN — Medication 10 ML: at 09:08

## 2023-12-03 RX ADMIN — ROSUVASTATIN CALCIUM 40 MG: 40 TABLET, FILM COATED ORAL at 09:03

## 2023-12-03 RX ADMIN — INSULIN HUMAN 3 UNITS: 100 INJECTION, SOLUTION PARENTERAL at 18:23

## 2023-12-03 NOTE — PROGRESS NOTES
[facility]  Inpatient Progress Note    Reason for Follow Up: Coffee-ground emesis    Subjective     Interval History:   Slept poorly last night.  No further nausea or vomiting.  No further evidence of active bleeding      Current Facility-Administered Medications:     acetaminophen (TYLENOL) tablet 650 mg, 650 mg, Oral, Q4H PRN **OR** acetaminophen (TYLENOL) 160 MG/5ML oral solution 650 mg, 650 mg, Oral, Q4H PRN **OR** acetaminophen (TYLENOL) suppository 650 mg, 650 mg, Rectal, Q4H PRN, Bernardo Amaya MD    acetaminophen (TYLENOL) tablet 650 mg, 650 mg, Oral, Q4H PRN **OR** acetaminophen (TYLENOL) suppository 650 mg, 650 mg, Rectal, Q4H PRN, Bernardo Amaya MD    budesonide-formoterol (SYMBICORT) 160-4.5 MCG/ACT inhaler 2 puff, 2 puff, Inhalation, BID - RT, Bernardo Amaya MD, 2 puff at 12/03/23 0713    calcium carbonate (TUMS) chewable tablet 500 mg (200 mg elemental), 2 tablet, Oral, BID PRN, Bernardo Amaya MD    carvedilol (COREG) tablet 6.25 mg, 6.25 mg, Oral, BID With Meals, Gonsalo Lyman MD, 6.25 mg at 12/03/23 0904    cetirizine (zyrTEC) tablet 10 mg, 10 mg, Oral, Daily, Delta Marie MD, 10 mg at 12/03/23 0904    dextrose (D50W) (25 g/50 mL) IV injection 25 g, 25 g, Intravenous, Q15 Min PRN, Bernardo Amaya MD    dextrose (D50W) (25 g/50 mL) IV injection 25 g, 25 g, Intravenous, Q15 Min PRN, Bernardo Amaya MD    dextrose (GLUTOSE) oral gel 15 g, 15 g, Oral, Q15 Min PRN, Bernardo Amaya MD    dilTIAZem (CARDIZEM) 100 mg in 100 mL NS infusion (ADV), 5-15 mg/hr, Intravenous, Titrated, Bernardo Amaya MD, Held at 12/01/23 2200    glucagon (GLUCAGEN) injection 1 mg, 1 mg, Intramuscular, Q15 Min PRN, Bernardo Amaya MD    insulin glargine (LANTUS, SEMGLEE) injection 2 Units, 2 Units, Subcutaneous, Nightly, Delta Marie MD, 2 Units at 12/02/23 2057    insulin regular (humuLIN R,novoLIN R) injection 2-7 Units, 2-7 Units, Subcutaneous, 4x Daily AC & at Bedtime, Jesus  JI Hays, 3 Units at 12/02/23 2057    ipratropium-albuterol (DUO-NEB) nebulizer solution 3 mL, 3 mL, Nebulization, Q6H PRN, Bernardo Amaya MD    levothyroxine (SYNTHROID, LEVOTHROID) tablet 75 mcg, 75 mcg, Oral, Q AM, Bernardo Amaya MD, 75 mcg at 12/03/23 0625    nitroglycerin (NITROSTAT) SL tablet 0.4 mg, 0.4 mg, Sublingual, Q5 Min PRN, Bernardo Amaya MD    ondansetron (ZOFRAN) tablet 4 mg, 4 mg, Oral, Q6H PRN **OR** ondansetron (ZOFRAN) injection 4 mg, 4 mg, Intravenous, Q6H PRN, Bernardo Amaya MD    pantoprazole (PROTONIX) injection 40 mg, 40 mg, Intravenous, BID, Amber Person APRN, 40 mg at 12/03/23 0903    phenol (CHLORASEPTIC) 1.4 % liquid 1 spray, 1 spray, Mouth/Throat, Q2H PRN, Ashley Lee APRN    rosuvastatin (CRESTOR) tablet 40 mg, 40 mg, Oral, Daily, Delta Marie MD, 40 mg at 12/03/23 0903    sodium bicarbonate tablet 650 mg, 650 mg, Oral, TID, Bernardo Amaya MD, 650 mg at 12/03/23 0904    sodium chloride 0.9 % bolus 500 mL, 500 mL, Intravenous, Once, Bernardo Amaya MD    sodium chloride 0.9 % flush 10 mL, 10 mL, Intravenous, PRN, Bernardo Amaya MD    Insert Peripheral IV, , , Once **AND** sodium chloride 0.9 % flush 10 mL, 10 mL, Intravenous, PRN, Bernardo Amaya MD    sodium chloride 0.9 % flush 10 mL, 10 mL, Intravenous, Q12H, Bernardo Amaya MD, 10 mL at 12/03/23 0908    sodium chloride 0.9 % flush 10 mL, 10 mL, Intravenous, PRN, Bernardo Amaya MD    sodium chloride 0.9 % flush 10 mL, 10 mL, Intravenous, Q12H, Bernardo Amaya MD, 10 mL at 12/03/23 0907    sodium chloride 0.9 % flush 10 mL, 10 mL, Intravenous, PRN, Bernardo Amaya MD    sodium chloride 0.9 % infusion 40 mL, 40 mL, Intravenous, PRN, Bernardo Amaya MD    sodium chloride 0.9 % infusion 40 mL, 40 mL, Intravenous, PRN, Bernardo Amaya MD    sodium chloride nasal spray 1 spray, 1 spray, Each Nare, PRN, Delta Marie MD, 1 spray at 12/02/23 2058  Review of Systems:    The following systems were  reviewed and negative;  constitution and gastrointestinal    Objective     Vital Signs  Temp:  [97.3 °F (36.3 °C)-98.6 °F (37 °C)] 98.6 °F (37 °C)  Heart Rate:  [] 110  Resp:  [16-18] 16  BP: (109-151)/(50-81) 113/81  Body mass index is 23.91 kg/m².    Intake/Output Summary (Last 24 hours) at 12/3/2023 1004  Last data filed at 12/3/2023 0904  Gross per 24 hour   Intake 240 ml   Output 450 ml   Net -210 ml     I/O this shift:  In: 240 [P.O.:240]  Out: -      Physical Exam:   General: patient awake, alert and cooperative   Eyes: Normal lids and lashes, no scleral icterus   Neck: supple, normal ROM   Skin: warm and dry, not jaundiced   Pulm:  regular and unlabored   Abdomen: soft, nontender, nondistended    Psychiatric: Normal mood and behavior; memory intact     Results Review:     I reviewed the patient's new clinical results.    Results from last 7 days   Lab Units 12/03/23  0749 12/03/23  0002 12/02/23 2034 12/02/23  0320   WBC 10*3/mm3 11.80*  --  7.66 6.92   HEMOGLOBIN g/dL 8.6* 8.1* 8.8* 8.1*   HEMATOCRIT % 27.0* 26.0* 28.8* 25.8*   PLATELETS 10*3/mm3 257  --  263 253     Results from last 7 days   Lab Units 12/03/23  0749 12/02/23 2034 12/02/23  0320 11/30/23  0040 11/29/23  0756 11/28/23  2119   SODIUM mmol/L 137 137 137   < > 138 140   POTASSIUM mmol/L 4.6 4.3 4.3   < > 3.2* 4.0   CHLORIDE mmol/L 106 103 105   < > 101 97*   CO2 mmol/L 26.3 29.0 24.4   < > 26.3 26.3   BUN mg/dL 21 14 33*   < > 36* 28*   CREATININE mg/dL 2.24* 1.68* 3.14*   < > 3.24* 2.74*   CALCIUM mg/dL 8.4* 8.3* 8.3*   < > 8.8 9.5   BILIRUBIN mg/dL  --   --   --   --  0.3 0.4   ALK PHOS U/L  --   --   --   --  66 81   ALT (SGPT) U/L  --   --   --   --  23 31   AST (SGOT) U/L  --   --   --   --  19 27   GLUCOSE mg/dL 92 174* 238*   < > 133* 243*    < > = values in this interval not displayed.     Results from last 7 days   Lab Units 11/28/23 2119   INR  1.18*     Lab Results   Lab Value Date/Time    LIPASE 27 11/28/2023 2119     LIPASE 31 11/24/2023 1836    LIPASE 33 12/13/2021 1614    LIPASE 58 11/30/2017 2155    LIPASE 30 08/27/2016 1627    LIPASE 30 02/14/2016 0635       Radiology:  XR Chest 1 View   Final Result   No acute findings.       This report was finalized on 11/28/2023 9:51 PM by Dr. Alice Allen M.D on Workstation: [id number]              Assessment & Plan     Active Hospital Problems    Diagnosis     **Atrial fibrillation with rapid ventricular response     ESRD (end stage renal disease)     Nausea & vomiting     Hemoptysis     Embolic stroke     Coronary artery disease involving native coronary artery of native heart without angina pectoris     Chronic combined systolic and diastolic CHF (congestive heart failure)     Acquired hypothyroidism     Benign prostatic hyperplasia with nocturia     Type 2 diabetes mellitus, with long-term current use of insulin      All problems are new to me today  Assessment:  Coffee-ground emesis  Mild anemia.  Nausea and vomiting.  Resolved.  End-stage renal disease on hemodialysis  Elevated troponin level with cardiology following.      Plan:  Plan for EGD tomorrow for further evaluation of coffee-ground emesis-no further episodes  Continue pantoprazole  H&H stable-continue to follow    I discussed the patients findings and my recommendations with patient.    Yesenia Blancas MD

## 2023-12-03 NOTE — PROGRESS NOTES
LOS: 4 days   Patient Care Team:  Florencia Caballero MD as PCP - General (Internal Medicine)  Amber Murguia MD as Consulting Physician (Cardiology)  [facility] as Pharmacist  Seth Ladd MD as Surgeon (General Surgery)  Kim Hoyos MD PhD as Consulting Physician (Hematology and Oncology)  Jerome Diallo MD as Referring Physician (Family Medicine)  Jose Enrique Louie MD as Consulting Physician (Gastroenterology)  Nima Huddleston MD as Consulting Physician (Nephrology)    Chief Complaint: Follow-up coffee-ground emesis, chronic combined CHF, paroxysmal atrial fibrillation with RVR.    Interval History: He is doing better today in general.  His atrial fibrillation rate is better controlled.  He is still on 5 mg/h on a diltiazem drip.  No chest pain.  No further coffee-ground emesis.    Vital Signs:  Temp:  [97.3 °F (36.3 °C)-98.6 °F (37 °C)] 98.6 °F (37 °C)  Heart Rate:  [] 90  Resp:  [16-18] 16  BP: (109-151)/(50-81) 113/81    Intake/Output Summary (Last 24 hours) at 12/3/2023 1337  Last data filed at 12/3/2023 1213  Gross per 24 hour   Intake 480 ml   Output 650 ml   Net -170 ml         Physical Exam:   General Appearance:    No acute distress, alert and oriented x4, pale, chronically ill-appearing.   Lungs:     Clear to auscultation bilaterally     Heart:    Irregularly irregular with a normal rate.  No murmurs, gallops, or rubs noted.    Abdomen:     Soft, nontender, nondistended.    Extremities:   No clubbing, cyanosis, or edema.     Results Review:    Results from last 7 days   Lab Units 12/03/23  0749   SODIUM mmol/L 137   POTASSIUM mmol/L 4.6   CHLORIDE mmol/L 106   CO2 mmol/L 26.3   BUN mg/dL 21   CREATININE mg/dL 2.24*   GLUCOSE mg/dL 92   CALCIUM mg/dL 8.4*     Results from last 7 days   Lab Units 11/30/23  0226 11/30/23  0040 11/29/23  0756   HSTROP T ng/L 170* 173* 155*     Results from last 7 days   Lab Units 12/03/23  0749   WBC 10*3/mm3 11.80*   HEMOGLOBIN g/dL  8.6*   HEMATOCRIT % 27.0*   PLATELETS 10*3/mm3 257     Results from last 7 days   Lab Units 11/28/23  2119   INR  1.18*         Results from last 7 days   Lab Units 11/30/23  0040   MAGNESIUM mg/dL 2.0           I reviewed the patient's new clinical results.        Assessment:  1.  Recent vomiting and diarrhea, possibly viral gastroenteritis  2.  Coffee-ground emesis  3.  Paroxysmal atrial fibrillation with RVR  4.  Chronic combined CHF  5.  Cardiomyopathy, EF 40 to 45% on 3/8/2023  6.  Coronary artery disease, status post CABG in 2001  7.  History of embolic CVA, on chronic anticoagulation  8.  End-stage renal disease, on hemodialysis  9.  Altered mental status, likely multifactorial  10.  Anemia of chronic disease  11.  COPD without acute exacerbation (on home O2)  12.  Right bundle branch block, chronic  13.  Elevated troponin secondary to renal disease and type II NSTEMI secondary to #1 and #3  14.  Diabetes    Plan:    Vital signs are normal  Hemoglobin 8.1  Continue Coreg will increase the dose to 6.25 twice a day    12/3  Tolerating increase Coreg well  Blood pressure much better  Cardiovascular remained stable    Gonsalo Lyman MD  12/03/23  13:37 EST

## 2023-12-03 NOTE — PROGRESS NOTES
[facility] Progress Note      Patient Name: Carlito Mo  : 1955  MRN: [id number]  Primary Care Physician:  Florencia Caballero MD  Date of admission: 2023    Subjective     Interval History:   Follow-up end-stage renal disease  Patient is tired today.  Dialysis without event yesterday. He denies any chest pain or shortness of air, no orthopnea or PND, no nausea or vomiting,.    Review of Systems:   As noted above    Objective     Vitals:   Temp:  [97.3 °F (36.3 °C)-98.8 °F (37.1 °C)] 98.8 °F (37.1 °C)  Heart Rate:  [] 102  Resp:  [16-18] 18  BP: (104-151)/(50-81) 104/61    Intake/Output Summary (Last 24 hours) at 12/3/2023 1540  Last data filed at 12/3/2023 1213  Gross per 24 hour   Intake 480 ml   Output 650 ml   Net -170 ml         Physical Exam:    General Appearance: alert, awake, chronically ill, no acute distress   Skin: warm and dry  HEENT: oral mucosa normal, nonicteric sclera  Neck: supple, no JVD  Lungs: Bilateral rhonchi, breathing effort not labored  Heart: Irregularly irregular, no rub  Abdomen: soft, nontender, nondistended  : no palpable bladder  Extremities: no edema, cyanosis or clubbing, functional AV fistula in the right forearm with good thrill and bruit  Neuro: More awake and alert moving all extremities        Scheduled Meds:     budesonide-formoterol, 2 puff, Inhalation, BID - RT  carvedilol, 6.25 mg, Oral, BID With Meals  cetirizine, 10 mg, Oral, Daily  insulin glargine, 2 Units, Subcutaneous, Nightly  insulin regular, 2-7 Units, Subcutaneous, 4x Daily AC & at Bedtime  levothyroxine, 75 mcg, Oral, Q AM  pantoprazole, 40 mg, Intravenous, BID  rosuvastatin, 40 mg, Oral, Daily  sodium bicarbonate, 650 mg, Oral, TID  sodium chloride, 500 mL, Intravenous, Once  sodium chloride, 10 mL, Intravenous, Q12H  sodium chloride, 10 mL, Intravenous, Q12H      IV Meds:   dilTIAZem, 5-15 mg/hr, Last Rate: Stopped (23)        Results Reviewed:   I  have personally reviewed the results from the time of this admission to 12/3/2023 15:40 EST     Results from last 7 days   Lab Units 12/03/23  0749 12/02/23 2034 12/02/23 0320 11/30/23 0040 11/29/23  0756 11/28/23  2119   SODIUM mmol/L 137 137 137   < > 138 140   POTASSIUM mmol/L 4.6 4.3 4.3   < > 3.2* 4.0   CHLORIDE mmol/L 106 103 105   < > 101 97*   CO2 mmol/L 26.3 29.0 24.4   < > 26.3 26.3   BUN mg/dL 21 14 33*   < > 36* 28*   CREATININE mg/dL 2.24* 1.68* 3.14*   < > 3.24* 2.74*   CALCIUM mg/dL 8.4* 8.3* 8.3*   < > 8.8 9.5   BILIRUBIN mg/dL  --   --   --   --  0.3 0.4   ALK PHOS U/L  --   --   --   --  66 81   ALT (SGPT) U/L  --   --   --   --  23 31   AST (SGOT) U/L  --   --   --   --  19 27   GLUCOSE mg/dL 92 174* 238*   < > 133* 243*    < > = values in this interval not displayed.       Estimated Creatinine Clearance: 30 mL/min (A) (by C-G formula based on SCr of 2.24 mg/dL (H)).    Results from last 7 days   Lab Units 12/02/23 2034 12/02/23 0320 12/01/23 0903 11/30/23 0040 11/30/23  0040 11/28/23  2119   MAGNESIUM mg/dL  --   --   --   --  2.0 2.0   PHOSPHORUS mg/dL 1.1* 2.5 1.5*   < > 3.0  --     < > = values in this interval not displayed.             Results from last 7 days   Lab Units 12/03/23  0749 12/03/23  0002 12/02/23 2034 12/02/23 0320 12/02/23  0004 12/01/23  1547 12/01/23 0903 11/30/23 0226 11/30/23  0040   WBC 10*3/mm3 11.80*  --  7.66 6.92  --   --  6.62  --  7.40   HEMOGLOBIN g/dL 8.6* 8.1* 8.8* 8.1* 8.1*   < > 8.6*   < > 9.3*  9.3*   PLATELETS 10*3/mm3 257  --  263 253  --   --  260  --  235    < > = values in this interval not displayed.       Results from last 7 days   Lab Units 11/28/23  2119   INR  1.18*       Assessment / Plan     ASSESSMENT:  End-stage renal disease and diabetic nephropathy on maintenance hemodialysis every Tuesday, Thursday and Saturday,He appears to be euvolemic, potassium is 4.6, plan for dialysis again Tuesday.   Hypophosphatemia phosphorus yesterday was  1.1, will monitor and treat if it is less than 1  Atrial fibrillation with controlled ventricular rate currently off diltiazem  Diabetes mellitus type 2 with renal complication  Hypertension with chronic kidney disease, well controlled. Coreg increased to 6.25 mg bid.  Anemia of chronic kidney disease, hemoglobin today is 8.6 treated with long-acting HAI as an outpatient  Altered mental status.  Resolved  History of coronary artery disease and prior CVA.       PLAN:  Continue the same treatment  Next dialysis on 12/5/2023 condition permits.  Surveillance labs    I reviewed the chart and other providers notes, I reviewed labs  I discussed the case with the patient.  Copied text in this note has been reviewed and is accurate as of 12/03/23.       Thank you for involving us in the care of Carlito Mo.  Please feel free to call with any questions.    Cameron Olguin MD  12/03/23  15:40 EST    [facility]  [phone]    Please note that portions of this note were completed with a voice recognition program.

## 2023-12-03 NOTE — PROGRESS NOTES
Name: Carlito Mo ADMIT: 2023   : 1955  PCP: Florencia Caballero MD    MRN: [id number] LOS: 4 days   AGE/SEX: 68 y.o. male  ROOM: [facility]     Subjective   Subjective      off the diltiazem drip  Dyspnea better  On RA  Plans for EGD tomorrow        Objective   Objective   Vital Signs  Temp:  [97.3 °F (36.3 °C)-98.8 °F (37.1 °C)] 98.8 °F (37.1 °C)  Heart Rate:  [] 102  Resp:  [16-18] 18  BP: (104-151)/(50-81) 104/61  SpO2:  [96 %-97 %] 97 %  on   ;   Device (Oxygen Therapy): room air  Body mass index is 23.91 kg/m².  Physical Exam  Constitutional:       General: He is not in acute distress.     Appearance: He is ill-appearing. He is not toxic-appearing.   Cardiovascular:      Rate and Rhythm: Tachycardia present.   Pulmonary:      Effort: Pulmonary effort is normal.      Breath sounds: Normal breath sounds.   Abdominal:      General: Bowel sounds are normal.      Palpations: Abdomen is soft.   Musculoskeletal:         General: No tenderness.      Right lower leg: No edema.      Left lower leg: No edema.   Neurological:      Mental Status: He is alert.   Psychiatric:         Mood and Affect: Mood normal.         Behavior: Behavior normal.         Results Review     I reviewed the patient's new clinical results.  Results from last 7 days   Lab Units 23  0749 23  0002 23  1547 23  0903   WBC 10*3/mm3 11.80*  --  7.66 6.92  --  6.62   HEMOGLOBIN g/dL 8.6* 8.1* 8.8* 8.1*   < > 8.6*   PLATELETS 10*3/mm3 257  --  263 253  --  260    < > = values in this interval not displayed.     Results from last 7 days   Lab Units 23  0749 230 23  0903   SODIUM mmol/L 137 137 137 134*   POTASSIUM mmol/L 4.6 4.3 4.3 4.5   CHLORIDE mmol/L 106 103 105 104   CO2 mmol/L 26.3 29.0 24.4 22.0   BUN mg/dL 21 14 33* 27*   CREATININE mg/dL 2.24* 1.68* 3.14* 2.92*   GLUCOSE mg/dL 92 174* 238* 173*   Estimated Creatinine Clearance: 30  mL/min (A) (by C-G formula based on SCr of 2.24 mg/dL (H)).  Results from last 7 days   Lab Units 12/02/23 2034 12/02/23 0320 12/01/23 0903 11/30/23 0040 11/29/23 0756 11/28/23 2119   ALBUMIN g/dL 3.3* 2.8* 3.0* 3.0* 3.2* 3.9   BILIRUBIN mg/dL  --   --   --   --  0.3 0.4   ALK PHOS U/L  --   --   --   --  66 81   AST (SGOT) U/L  --   --   --   --  19 27   ALT (SGPT) U/L  --   --   --   --  23 31     Results from last 7 days   Lab Units 12/03/23 0749 12/02/23 2034 12/02/23 0320 12/01/23 0903 11/30/23 0040 11/29/23 0756 11/28/23 2119   CALCIUM mg/dL 8.4* 8.3* 8.3* 8.3* 8.6   < > 9.5   ALBUMIN g/dL  --  3.3* 2.8* 3.0* 3.0*   < > 3.9   MAGNESIUM mg/dL  --   --   --   --  2.0  --  2.0   PHOSPHORUS mg/dL  --  1.1* 2.5 1.5* 3.0  --   --     < > = values in this interval not displayed.     Results from last 7 days   Lab Units 11/29/23 0026 11/28/23 2120   LACTATE mmol/L 1.9 3.5*     COVID19   Date Value Ref Range Status   05/02/2023 Not Detected Not Detected - Ref. Range Final   03/07/2023 Not Detected Not Detected - Ref. Range Final   12/01/2022 Detected (C) Not Detected - Ref. Range Final   07/24/2022 Not Detected Not Detected - Ref. Range Final   06/04/2022 Not Detected Not Detected - Ref. Range Final     Hemoglobin A1C   Date/Time Value Ref Range Status   12/02/2023 0320 6.90 (H) 4.80 - 5.60 % Final     Glucose   Date/Time Value Ref Range Status   12/03/2023 1143 117 70 - 130 mg/dL Final   12/03/2023 0646 95 70 - 130 mg/dL Final   12/02/2023 2048 222 (H) 70 - 130 mg/dL Final   12/02/2023 1126 156 (H) 70 - 130 mg/dL Final   12/02/2023 0612 174 (H) 70 - 130 mg/dL Final   12/01/2023 2057 312 (H) 70 - 130 mg/dL Final   12/01/2023 1635 117 70 - 130 mg/dL Final       XR Chest 1 View  Narrative: SINGLE VIEW OF THE CHEST     HISTORY: Shortness of breath     COMPARISON: November 2030 is 23     FINDINGS:  The patient status post median sternotomy with coronary artery bypass  grafting. There is cardiomegaly. There  is calcification of the aorta. No  pneumothorax or pleural effusion is seen. No definite acute infiltrates  are identified.     Impression: No acute findings.     This report was finalized on 11/28/2023 9:51 PM by Dr. Alice Allen M.D on Workstation: [id number]       Scheduled Medications  budesonide-formoterol, 2 puff, Inhalation, BID - RT  carvedilol, 6.25 mg, Oral, BID With Meals  cetirizine, 10 mg, Oral, Daily  insulin glargine, 2 Units, Subcutaneous, Nightly  insulin regular, 2-7 Units, Subcutaneous, 4x Daily AC & at Bedtime  levothyroxine, 75 mcg, Oral, Q AM  pantoprazole, 40 mg, Intravenous, BID  rosuvastatin, 40 mg, Oral, Daily  sodium bicarbonate, 650 mg, Oral, TID  sodium chloride, 500 mL, Intravenous, Once  sodium chloride, 10 mL, Intravenous, Q12H  sodium chloride, 10 mL, Intravenous, Q12H    Infusions  dilTIAZem, 5-15 mg/hr, Last Rate: Stopped (12/01/23 2200)    Diet  Diet: Cardiac Diets, Diabetic Diets; Healthy Heart (2-3 Na+); Consistent Carbohydrate; Texture: Regular Texture (IDDSI 7); Fluid Consistency: Thin (IDDSI 0)  NPO Diet NPO Type: Strict NPO       Assessment/Plan     Active Hospital Problems    Diagnosis  POA    **Atrial fibrillation with rapid ventricular response [I48.91]  Yes    ESRD (end stage renal disease) [N18.6]  Yes    Nausea & vomiting [R11.2]  Yes    Hemoptysis [R04.2]  Yes    Coffee ground emesis [K92.0]  Unknown    Embolic stroke [I63.9]  Yes    Coronary artery disease involving native coronary artery of native heart without angina pectoris [I25.10]  Yes    Chronic combined systolic and diastolic CHF (congestive heart failure) [I50.42]  Yes    Acquired hypothyroidism [E03.9]  Yes    Benign prostatic hyperplasia with nocturia [N40.1, R35.1]  Yes    Type 2 diabetes mellitus, with long-term current use of insulin [E11.9, Z79.4]  Not Applicable      Resolved Hospital Problems   No resolved problems to display.       68 y.o. male admitted with Atrial fibrillation with rapid  ventricular response.    GI bleeding/coffee ground emesis/diarrhea-thought to be secondary to viral gastroenteritis with perhaps a small giancarlo-lr tear. eliquis on hold. On iv ppi bid. GI is planning EGD for Monday  Afib with rvr-s/p diltiazem drip. On low dose coreg which is being adjusted. AC held as above  Cardiogenic hypotension due to afib-improved  Elevated troponin due to ESRD and type 2 NSTEMI from rvr-no plans for ischemic workup at this time.  Metabolic encephalopathy-resolved  Coronary artery disease s/p CABG in 2001-statin/bb. Asa on hold  Chronic combined systolic and diastolic heart failure-bb. Torsemide is held. euvolemic  ESRD on HD T, TH, Sa-nephrology is managing dialysis  Anemia of chronic disease-on HAI as an outpatient  Type 2 diabetes-A1c 6.9. He is on a very low dose of glargine as an outpatient. Continue sliding scale.  COPD with chronic respiratory failure with hypoxia on home O2-symbicort  Hypothyroidism-synthroid  History of embolic CVA-eliquis on hold as above  Nasal congestion-zyrtec and saline nasal spray  SCDs for DVT prophylaxis.  Full code.  Discussed with patient.  Anticipate discharge to SNU facility timing yet to be determined. Perhaps 12/4 or 12/5.       Asim Hogue MD  [facility]  12/03/23  14:24 EST

## 2023-12-04 ENCOUNTER — ANESTHESIA (OUTPATIENT)
Dept: GASTROENTEROLOGY | Facility: HOSPITAL | Age: 68
End: 2023-12-04
Payer: MEDICARE

## 2023-12-04 ENCOUNTER — ANESTHESIA EVENT (OUTPATIENT)
Dept: GASTROENTEROLOGY | Facility: HOSPITAL | Age: 68
End: 2023-12-04
Payer: MEDICARE

## 2023-12-04 LAB
ALBUMIN SERPL-MCNC: 2.8 G/DL (ref 3.5–5.2)
ANION GAP SERPL CALCULATED.3IONS-SCNC: 5.7 MMOL/L (ref 5–15)
BASOPHILS # BLD AUTO: 0.01 10*3/MM3 (ref 0–0.2)
BASOPHILS NFR BLD AUTO: 0.1 % (ref 0–1.5)
BUN SERPL-MCNC: 32 MG/DL (ref 8–23)
BUN/CREAT SERPL: 10.8 (ref 7–25)
CALCIUM SPEC-SCNC: 8.2 MG/DL (ref 8.6–10.5)
CHLORIDE SERPL-SCNC: 105 MMOL/L (ref 98–107)
CO2 SERPL-SCNC: 27.3 MMOL/L (ref 22–29)
CREAT SERPL-MCNC: 2.96 MG/DL (ref 0.76–1.27)
DEPRECATED RDW RBC AUTO: 41.2 FL (ref 37–54)
EGFRCR SERPLBLD CKD-EPI 2021: 22.3 ML/MIN/1.73
EOSINOPHIL # BLD AUTO: 0.18 10*3/MM3 (ref 0–0.4)
EOSINOPHIL NFR BLD AUTO: 1.9 % (ref 0.3–6.2)
ERYTHROCYTE [DISTWIDTH] IN BLOOD BY AUTOMATED COUNT: 13.3 % (ref 12.3–15.4)
GLUCOSE BLDC GLUCOMTR-MCNC: 200 MG/DL (ref 70–130)
GLUCOSE BLDC GLUCOMTR-MCNC: 77 MG/DL (ref 70–130)
GLUCOSE BLDC GLUCOMTR-MCNC: 83 MG/DL (ref 70–130)
GLUCOSE BLDC GLUCOMTR-MCNC: 88 MG/DL (ref 70–130)
GLUCOSE SERPL-MCNC: 75 MG/DL (ref 65–99)
HCT VFR BLD AUTO: 26.2 % (ref 37.5–51)
HGB BLD-MCNC: 8 G/DL (ref 13–17.7)
IMM GRANULOCYTES # BLD AUTO: 0.09 10*3/MM3 (ref 0–0.05)
IMM GRANULOCYTES NFR BLD AUTO: 0.9 % (ref 0–0.5)
LYMPHOCYTES # BLD AUTO: 1.2 10*3/MM3 (ref 0.7–3.1)
LYMPHOCYTES NFR BLD AUTO: 12.6 % (ref 19.6–45.3)
MAGNESIUM SERPL-MCNC: 1.8 MG/DL (ref 1.6–2.4)
MCH RBC QN AUTO: 26.6 PG (ref 26.6–33)
MCHC RBC AUTO-ENTMCNC: 30.5 G/DL (ref 31.5–35.7)
MCV RBC AUTO: 87 FL (ref 79–97)
MONOCYTES # BLD AUTO: 0.64 10*3/MM3 (ref 0.1–0.9)
MONOCYTES NFR BLD AUTO: 6.7 % (ref 5–12)
NEUTROPHILS NFR BLD AUTO: 7.44 10*3/MM3 (ref 1.7–7)
NEUTROPHILS NFR BLD AUTO: 77.8 % (ref 42.7–76)
NRBC BLD AUTO-RTO: 0 /100 WBC (ref 0–0.2)
PHOSPHATE SERPL-MCNC: 2.2 MG/DL (ref 2.5–4.5)
PLATELET # BLD AUTO: 249 10*3/MM3 (ref 140–450)
PMV BLD AUTO: 9.7 FL (ref 6–12)
POTASSIUM SERPL-SCNC: 4.8 MMOL/L (ref 3.5–5.2)
RBC # BLD AUTO: 3.01 10*6/MM3 (ref 4.14–5.8)
SODIUM SERPL-SCNC: 138 MMOL/L (ref 136–145)
WBC NRBC COR # BLD AUTO: 9.56 10*3/MM3 (ref 3.4–10.8)

## 2023-12-04 PROCEDURE — 43239 EGD BIOPSY SINGLE/MULTIPLE: CPT | Performed by: INTERNAL MEDICINE

## 2023-12-04 PROCEDURE — 0DB48ZX EXCISION OF ESOPHAGOGASTRIC JUNCTION, VIA NATURAL OR ARTIFICIAL OPENING ENDOSCOPIC, DIAGNOSTIC: ICD-10-PCS | Performed by: INTERNAL MEDICINE

## 2023-12-04 PROCEDURE — 94664 DEMO&/EVAL PT USE INHALER: CPT

## 2023-12-04 PROCEDURE — 25810000003 SODIUM CHLORIDE 0.9 % SOLUTION: Performed by: INTERNAL MEDICINE

## 2023-12-04 PROCEDURE — 94799 UNLISTED PULMONARY SVC/PX: CPT

## 2023-12-04 PROCEDURE — 97530 THERAPEUTIC ACTIVITIES: CPT

## 2023-12-04 PROCEDURE — 80069 RENAL FUNCTION PANEL: CPT | Performed by: INTERNAL MEDICINE

## 2023-12-04 PROCEDURE — 63710000001 INSULIN GLARGINE PER 5 UNITS: Performed by: INTERNAL MEDICINE

## 2023-12-04 PROCEDURE — 88305 TISSUE EXAM BY PATHOLOGIST: CPT | Performed by: INTERNAL MEDICINE

## 2023-12-04 PROCEDURE — 83735 ASSAY OF MAGNESIUM: CPT | Performed by: INTERNAL MEDICINE

## 2023-12-04 PROCEDURE — 99232 SBSQ HOSP IP/OBS MODERATE 35: CPT | Performed by: INTERNAL MEDICINE

## 2023-12-04 PROCEDURE — 25010000002 PROPOFOL 10 MG/ML EMULSION

## 2023-12-04 PROCEDURE — 82948 REAGENT STRIP/BLOOD GLUCOSE: CPT

## 2023-12-04 PROCEDURE — 94760 N-INVAS EAR/PLS OXIMETRY 1: CPT

## 2023-12-04 PROCEDURE — 94761 N-INVAS EAR/PLS OXIMETRY MLT: CPT

## 2023-12-04 PROCEDURE — 63710000001 INSULIN REGULAR HUMAN PER 5 UNITS: Performed by: INTERNAL MEDICINE

## 2023-12-04 PROCEDURE — 85025 COMPLETE CBC W/AUTO DIFF WBC: CPT | Performed by: INTERNAL MEDICINE

## 2023-12-04 RX ORDER — IPRATROPIUM BROMIDE AND ALBUTEROL SULFATE 2.5; .5 MG/3ML; MG/3ML
3 SOLUTION RESPIRATORY (INHALATION)
Status: DISCONTINUED | OUTPATIENT
Start: 2023-12-04 | End: 2023-12-04 | Stop reason: HOSPADM

## 2023-12-04 RX ORDER — LIDOCAINE HYDROCHLORIDE 20 MG/ML
INJECTION, SOLUTION INFILTRATION; PERINEURAL AS NEEDED
Status: DISCONTINUED | OUTPATIENT
Start: 2023-12-04 | End: 2023-12-04 | Stop reason: SURG

## 2023-12-04 RX ORDER — GLYCOPYRROLATE 0.2 MG/ML
INJECTION INTRAMUSCULAR; INTRAVENOUS AS NEEDED
Status: DISCONTINUED | OUTPATIENT
Start: 2023-12-04 | End: 2023-12-04 | Stop reason: SURG

## 2023-12-04 RX ORDER — MANNITOL 250 MG/ML
12.5 INJECTION, SOLUTION INTRAVENOUS AS NEEDED
Status: CANCELLED | OUTPATIENT
Start: 2023-12-05 | End: 2023-12-05

## 2023-12-04 RX ORDER — PROPOFOL 10 MG/ML
VIAL (ML) INTRAVENOUS AS NEEDED
Status: DISCONTINUED | OUTPATIENT
Start: 2023-12-04 | End: 2023-12-04 | Stop reason: SURG

## 2023-12-04 RX ORDER — LIDOCAINE HYDROCHLORIDE 10 MG/ML
0.5 INJECTION, SOLUTION INFILTRATION; PERINEURAL ONCE AS NEEDED
Status: DISCONTINUED | OUTPATIENT
Start: 2023-12-04 | End: 2023-12-04

## 2023-12-04 RX ORDER — SODIUM CHLORIDE 9 MG/ML
1000 INJECTION, SOLUTION INTRAVENOUS CONTINUOUS
Status: DISCONTINUED | OUTPATIENT
Start: 2023-12-04 | End: 2023-12-05

## 2023-12-04 RX ORDER — SODIUM CHLORIDE 0.9 % (FLUSH) 0.9 %
10 SYRINGE (ML) INJECTION AS NEEDED
Status: DISCONTINUED | OUTPATIENT
Start: 2023-12-04 | End: 2023-12-04

## 2023-12-04 RX ADMIN — INSULIN GLARGINE 2 UNITS: 100 INJECTION, SOLUTION SUBCUTANEOUS at 21:54

## 2023-12-04 RX ADMIN — Medication 10 ML: at 21:54

## 2023-12-04 RX ADMIN — SODIUM CHLORIDE 1000 ML: 9 INJECTION, SOLUTION INTRAVENOUS at 10:40

## 2023-12-04 RX ADMIN — PROPOFOL 140 MCG/KG/MIN: 10 INJECTION, EMULSION INTRAVENOUS at 11:02

## 2023-12-04 RX ADMIN — LIDOCAINE HYDROCHLORIDE 80 MG: 20 INJECTION, SOLUTION INFILTRATION; PERINEURAL at 11:02

## 2023-12-04 RX ADMIN — PROPOFOL 100 MG: 10 INJECTION, EMULSION INTRAVENOUS at 11:02

## 2023-12-04 RX ADMIN — INSULIN HUMAN 3 UNITS: 100 INJECTION, SOLUTION PARENTERAL at 21:54

## 2023-12-04 RX ADMIN — CARVEDILOL 6.25 MG: 6.25 TABLET, FILM COATED ORAL at 17:59

## 2023-12-04 RX ADMIN — Medication 10 ML: at 12:11

## 2023-12-04 RX ADMIN — GLYCOPYRROLATE 0.1 MG: 0.2 INJECTION INTRAMUSCULAR; INTRAVENOUS at 11:02

## 2023-12-04 RX ADMIN — PANTOPRAZOLE SODIUM 40 MG: 40 INJECTION, POWDER, FOR SOLUTION INTRAVENOUS at 21:54

## 2023-12-04 RX ADMIN — LEVOTHYROXINE SODIUM 75 MCG: 75 TABLET ORAL at 06:11

## 2023-12-04 RX ADMIN — BUDESONIDE AND FORMOTEROL FUMARATE DIHYDRATE 2 PUFF: 160; 4.5 AEROSOL RESPIRATORY (INHALATION) at 21:19

## 2023-12-04 RX ADMIN — SODIUM BICARBONATE 650 MG: 650 TABLET, ORALLY DISINTEGRATING ORAL at 21:53

## 2023-12-04 RX ADMIN — PANTOPRAZOLE SODIUM 40 MG: 40 INJECTION, POWDER, FOR SOLUTION INTRAVENOUS at 12:11

## 2023-12-04 RX ADMIN — SODIUM BICARBONATE 650 MG: 650 TABLET, ORALLY DISINTEGRATING ORAL at 17:59

## 2023-12-04 RX ADMIN — APIXABAN 2.5 MG: 2.5 TABLET, FILM COATED ORAL at 21:54

## 2023-12-04 RX ADMIN — BUDESONIDE AND FORMOTEROL FUMARATE DIHYDRATE 2 PUFF: 160; 4.5 AEROSOL RESPIRATORY (INHALATION) at 08:00

## 2023-12-04 NOTE — PLAN OF CARE
Goal Outcome Evaluation:  Plan of Care Reviewed With: patient        Progress: improving  Outcome Evaluation: vss, ra, afib rate controlled, a/o x4. EGD complete. HD orders for tomorrow called in.

## 2023-12-04 NOTE — PROGRESS NOTES
LOS: 5 days   Patient Care Team:  Florencia Caballero MD as PCP - General (Internal Medicine)  Amber Murguia MD as Consulting Physician (Cardiology)  [facility] as Pharmacist  Seth Ladd MD as Surgeon (General Surgery)  Kim Hoyos MD PhD as Consulting Physician (Hematology and Oncology)  Jerome Diallo MD as Referring Physician (Family Medicine)  Jose Enrique Louie MD as Consulting Physician (Gastroenterology)  Nima Huddleston MD as Consulting Physician (Nephrology)    Chief Complaint: Follow-up coffee-ground emesis, chronic combined CHF, paroxysmal atrial fibrillation with RVR.    Interval History:  I saw him just after his EGD.  EGD with a 3 cm hiatal hernia and grade B reflux esophagitis.  There was no obvious bleeding source.  No chest pain or shortness of breath.  Atrial fibrillation rate was controlled.    Vital Signs:  Temp:  [97.5 °F (36.4 °C)-99.3 °F (37.4 °C)] 97.7 °F (36.5 °C)  Heart Rate:  [70-90] 90  Resp:  [16-20] 20  BP: ()/(56-93) 152/67    Intake/Output Summary (Last 24 hours) at 12/4/2023 1806  Last data filed at 12/4/2023 1155  Gross per 24 hour   Intake 500 ml   Output --   Net 500 ml       Physical Exam:   General Appearance:    No acute distress, alert and oriented x4, pale, chronically ill-appearing.   Lungs:     Clear to auscultation bilaterally     Heart:    Irregularly irregular with a normal rate.  No murmurs, gallops, or rubs noted.    Abdomen:     Soft, nontender, nondistended.    Extremities:   No clubbing, cyanosis, or edema.     Results Review:    Results from last 7 days   Lab Units 12/04/23  0259   SODIUM mmol/L 138   POTASSIUM mmol/L 4.8   CHLORIDE mmol/L 105   CO2 mmol/L 27.3   BUN mg/dL 32*   CREATININE mg/dL 2.96*   GLUCOSE mg/dL 75   CALCIUM mg/dL 8.2*     Results from last 7 days   Lab Units 11/30/23  0226 11/30/23  0040 11/29/23  0756   HSTROP T ng/L 170* 173* 155*     Results from last 7 days   Lab Units 12/04/23  0259   WBC  10*3/mm3 9.56   HEMOGLOBIN g/dL 8.0*   HEMATOCRIT % 26.2*   PLATELETS 10*3/mm3 249     Results from last 7 days   Lab Units 11/28/23  2119   INR  1.18*         Results from last 7 days   Lab Units 12/04/23  0259   MAGNESIUM mg/dL 1.8           I reviewed the patient's new clinical results.        Assessment:  1.  Recent vomiting and diarrhea, possibly viral gastroenteritis  2.  Coffee-ground emesis - no obvious bleeding source by EGD on 12/4/2023.  3.  Paroxysmal atrial fibrillation with RVR  4.  Chronic combined CHF  5.  Cardiomyopathy, EF 40 to 45% on 3/8/2023  6.  Coronary artery disease, status post CABG in 2001  7.  History of embolic CVA, on chronic anticoagulation  8.  End-stage renal disease, on hemodialysis  9.  Altered mental status, likely multifactorial  10.  Anemia of chronic disease  11.  COPD without acute exacerbation (on home O2)  12.  Right bundle branch block, chronic  13.  Elevated troponin secondary to renal disease and type II NSTEMI secondary to #1 and #3  14.  Diabetes    Plan:  -No obvious bleeding source on his EGD from earlier today.    -He has been resumed on half of his normal dose of Eliquis at 2.5 mg twice daily.  We can see how he tolerates this and make sure his hemoglobin is stable.  Ultimately, he would need to be on the 5 mg twice daily dose for complete protection.    -Heart rate is reasonable.  Continue carvedilol at 6.25 mg twice daily.  He was initially hypotensive, but is now tolerating this dose without issues.  I stopped the order for the Cardizem drip.    -Troponin elevation is secondary to renal disease and a type II NSTEMI secondary to the atrial fibrillation with RVR.  I do not feel this is ACS.    -Volume status per hemodialysis.      Mauricio oCrona MD  12/04/23  18:06 EST

## 2023-12-04 NOTE — THERAPY TREATMENT NOTE
Patient Name: Carlito Mo  : 1955    MRN: [id number]                              Today's Date: 2023       Admit Date: 2023    Visit Dx:     ICD-10-CM ICD-9-CM   1. Atrial fibrillation with rapid ventricular response  I48.91 427.31   2. Nausea and vomiting, unspecified vomiting type  R11.2 787.01   3. End stage renal disease on dialysis  N18.6 585.6    Z99.2 V45.11   4. Coagulopathy: Eliquis induced  D68.9 286.9   5. Upper GI bleed  K92.2 578.9   6. Coffee ground emesis  K92.0 578.0     Patient Active Problem List   Diagnosis    Major depressive disorder with single episode, in partial remission    Other hyperlipidemia    Type 2 diabetes mellitus, with long-term current use of insulin    Vitamin D deficiency    Erectile dysfunction    Chronic fatigue    Type 2 diabetes mellitus with ophthalmic complication    Skin lesion of chest wall    Slow transit constipation    Benign prostatic hyperplasia with nocturia    History of basal cell carcinoma    High blood pressure associated with diabetes    Acquired hypothyroidism    Hypertensive urgency    Recurrent strokes    Noncompliance w/medication treatment due to intermit use of medication    Urinary retention    Pneumonia    Chronic combined systolic and diastolic CHF (congestive heart failure)    Acute respiratory failure with hypoxia    Suspected sleep apnea    Pleural effusion    YAW (obstructive sleep apnea)    Coronary artery disease involving native coronary artery of native heart without angina pectoris    Chronically elevated troponin    Colon cancer screening    Enlarged lymph nodes    Functional gait abnormality    History of myocardial infarction    Hospital discharge follow-up    Insomnia    Iron deficiency anemia    Major depression, recurrent    Anemia, chronic renal failure, stage 3 (moderate)    Essential hypertension    Adverse effect of iron and its compounds, initial encounter    Acute on chronic renal insufficiency    Debility     Falls frequently    Acute pain of right shoulder    Acute on chronic anemia    Heme positive stool    Neurogenic orthostatic hypotension    Anemia, chronic disease    History of colon polyps    Helicobacter pylori gastritis    Esophagitis    Sleep related hypoxia    Embolic stroke    Patent foramen ovale    Pleural effusion, bilateral    Cardiomyopathy    Lower abdominal pain    Diarrhea    CKD (chronic kidney disease) stage 4, GFR 15-29 ml/min    Hypertension not at goal    Memory loss due to medical condition    Generalized weakness    ERRONEOUS ENCOUNTER--DISREGARD    Weakness    Paroxysmal atrial fibrillation    Chronic anticoagulation    Multiple falls    Dehydration    SYLVAIN (acute kidney injury)    Acute ischemic stroke    Acute combined systolic and diastolic congestive heart failure    History of stroke    Iron deficiency anemia    Acute HFrEF (heart failure with reduced ejection fraction)    Atrial fibrillation with rapid ventricular response    ESRD (end stage renal disease)    Nausea & vomiting    Hemoptysis    Coffee ground emesis     Past Medical History:   Diagnosis Date    Acquired hypothyroidism     Acute congestive heart failure     Acute respiratory failure with hypoxia     Acute sinusitis     SYLVAIN (acute kidney injury)     on CKD    Anemia     Arthritis     CAD (coronary artery disease)     Cancer     skin    Chronic combined systolic and diastolic congestive heart failure     Chronic ischemic heart disease     Chronic kidney disease (CKD)     CKD (chronic kidney disease)     COPD (chronic obstructive pulmonary disease)     Depression     Diabetes mellitus type 2, uncontrolled, without complications     Diabetic neuropathy     Disease of thyroid gland     Elevated cholesterol     Fatigue     Frequent PVCs     Generalized weakness     GERD (gastroesophageal reflux disease)     Heart attack     History of coronary artery disease     with remote history of bypass surgery in 2001    Hyperlipidemia      Hypertension     Hypertensive encephalopathy     Mental status change     Acute    Nausea & vomiting 12/01/2023    NO DEVICE/RECALLED     Pleural effusion     Pneumonia     Poor historian     RBBB (right bundle branch block)     Stroke     POOR VISION    TIA (transient ischemic attack)     Type 2 diabetes mellitus     Urinary retention     Vitamin D deficiency      Past Surgical History:   Procedure Laterality Date    APPENDECTOMY N/A 02/14/2016    Dr. Alexey Dodson    ARTERIOVENOUS FISTULA/SHUNT SURGERY Right 06/03/2022    Procedure: RIGHT FOREARM ARTERIOVENOUS FISTULA PLACEMENT RADIOCEPHALIC WITH CEPHALIC VEIN TRANSPOSITION;  Surgeon: Eliseo Willis MD;  Location: [facility] MAIN OR;  Service: Vascular;  Laterality: Right;    COLONOSCOPY      over 20 years ago.  no polyps     COLONOSCOPY N/A 09/18/2018    Procedure: COLONOSCOPY;  Surgeon: Jose Enrique Louie MD;  Location: [facility] ENDOSCOPY;  Service: Gastroenterology    COLONOSCOPY N/A 09/19/2018    IH, EH, polyps (TAs w/low grade dysplasia)    COLONOSCOPY N/A 06/01/2020    Procedure: COLONOSCOPY;  Surgeon: Jose Enrique Louie MD;  Location: [facility] ENDOSCOPY;  Service: Gastroenterology;  Laterality: N/A;  Pre op-Anemia, Melena, History of Polyps  Post op-To Cecum/TI, Polyp, Poor Prep    CORONARY ARTERY BYPASS GRAFT  11/2001    ENDOSCOPY N/A 05/29/2020    Procedure: ESOPHAGOGASTRODUODENOSCOPY with biopsies;  Surgeon: Amanda Lovelace MD;  Location: [facility] ENDOSCOPY;  Service: Gastroenterology;  Laterality: N/A;  pre-anemia, dark stools  post-esophagitis, hiatal hernia    ENDOSCOPY N/A 09/15/2020    Procedure: ESOPHAGOGASTRODUODENOSCOPY WITH BIOPSIES;  Surgeon: Jose Enrique Louie MD;  Location: [facility] ENDOSCOPY;  Service: Gastroenterology;  Laterality: N/A;  pre: history of melena and esophagitis  post: mild esophagitis and gastritis, small hiatal hernia    HERNIA REPAIR Left 12/05/2019    THORACENTESIS      TONSILLECTOMY      VASECTOMY        General  Information       Row Name 12/04/23 1542          Physical Therapy Time and Intention    Document Type therapy note (daily note)  -CS     Mode of Treatment individual therapy;physical therapy  -CS       Row Name 12/04/23 1542          General Information    Patient Profile Reviewed yes  -CS     Existing Precautions/Restrictions fall  -CS       Row Name 12/04/23 1542          Cognition    Orientation Status (Cognition) oriented x 3  -CS       Row Name 12/04/23 1542          Safety Issues, Functional Mobility    Impairments Affecting Function (Mobility) balance;endurance/activity tolerance;postural/trunk control;strength  -CS               User Key  (r) = Recorded By, (t) = Taken By, (c) = Cosigned By      Initials Name Provider Type    CS ShethDameon navarreteoe, PT Physical Therapist                   Mobility       Row Name 12/04/23 1543          Bed Mobility    Bed Mobility supine-sit;sit-supine  -CS     Supine-Sit [place] (Bed Mobility) moderate assist (50% patient effort);verbal cues  -CS     Sit-Supine [place] (Bed Mobility) minimum assist (75% patient effort);verbal cues  -CS     Assistive Device (Bed Mobility) head of bed elevated;bed rails  -CS     Comment, (Bed Mobility) increased time to complete  -CS       Row Name 12/04/23 1543          Sit-Stand Transfer    Sit-Stand [place] (Transfers) minimum assist (75% patient effort)  -CS     Assistive Device (Sit-Stand Transfers) walker, front-wheeled  -CS       Row Name 12/04/23 1543          Gait/Stairs (Locomotion)    [place] Level (Gait) contact guard  -CS     Distance in Feet (Gait) 30'  -CS     Deviations/Abnormal Patterns (Gait) eli decreased;gait speed decreased;stride length decreased  -CS     Bilateral Gait Deviations forward flexed posture  -CS     Comment, (Gait/Stairs) very slow pace; no LOB  -CS               User Key  (r) = Recorded By, (t) = Taken By, (c) = Cosigned By      Initials Name Provider Type    CS Sheth, Tiffany, PT  Physical Therapist                   Obj/Interventions       Row Name 12/04/23 1548          Balance    Balance Assessment sitting static balance;sitting dynamic balance;standing static balance;standing dynamic balance  -CS     Static Sitting Balance standby assist  -CS     Dynamic Sitting Balance standby assist  -CS     Position, Sitting Balance unsupported;sitting edge of bed  -CS     Static Standing Balance contact guard  -CS     Dynamic Standing Balance contact guard  -CS     Position/Device Used, Standing Balance supported;walker, front-wheeled  -CS               User Key  (r) = Recorded By, (t) = Taken By, (c) = Cosigned By      Initials Name Provider Type    CS Tiffany Sheth, PT Physical Therapist                   Goals/Plan    No documentation.                  Clinical Impression       Row Name 12/04/23 1549          Pain    Pretreatment Pain Rating 0/10 - no pain  -CS     Posttreatment Pain Rating 0/10 - no pain  -CS       Row Name 12/04/23 1549          Plan of Care Review    Plan of Care Reviewed With patient  -CS     Progress improving  -CS     Outcome Evaluation Pt received in bed upon arrival and agreeable to PT. Pt required mod A and increased time to reach sitting EOB. Pt stood requiring min A and ambulated 30' c RW requiring CGA. Pt demo's a very slow pace with shuffling gait at times. Cues for upright posture and foot clearance. Distance limited by fatigue and weakness. Pt returned to bed with all needs in reach. Pt will continue to benefit from skilled PT to address functional deficits.  -       Row Name 12/04/23 1549          Therapy Assessment/Plan (PT)    Criteria for Skilled Interventions Met (PT) yes;meets criteria  -CS     Therapy Frequency (PT) 5 times/wk  -       Row Name 12/04/23 1549          Positioning and Restraints    Pre-Treatment Position in bed  -CS     Post Treatment Position bed  -CS     In Bed notified nsg;call light within reach;encouraged to call for assist;exit alarm  on  -CS               User Key  (r) = Recorded By, (t) = Taken By, (c) = Cosigned By      Initials Name Provider Type    CS Tiffany Sheth, DENISSE Physical Therapist                   Outcome Measures       Row Name 12/04/23 1550          How much help from another person do you currently need...    Turning from your back to your side while in flat bed without using bedrails? 3  -CS     Moving from lying on back to sitting on the side of a flat bed without bedrails? 2  -CS     Moving to and from a bed to a chair (including a wheelchair)? 3  -CS     Standing up from a chair using your arms (e.g., wheelchair, bedside chair)? 3  -CS     Climbing 3-5 steps with a railing? 2  -CS     To walk in hospital room? 3  -CS     AM-PAC 6 Clicks Score (PT) 16  -CS     Highest Level of Mobility Goal 5 --> Static standing  -CS       Row Name 12/04/23 1550          Functional Assessment    Outcome Measure Options AM-PAC 6 Clicks Basic Mobility (PT)  -CS               User Key  (r) = Recorded By, (t) = Taken By, (c) = Cosigned By      Initials Name Provider Type    Tiffany Elias, PT Physical Therapist                                 Physical Therapy Education       Title: PT OT SLP Therapies (In Progress)       Topic: Physical Therapy (In Progress)       Point: Mobility training (Done)       Learning Progress Summary             Patient Acceptance, E,TB, VU,DU by CS at 12/4/2023 1550    Acceptance, E,TB, VU,NR by EE at 12/1/2023 1525                         Point: Home exercise program (Not Started)       Learner Progress:  Not documented in this visit.              Point: Body mechanics (Done)       Learning Progress Summary             Patient Acceptance, E,TB, VU,DU by CS at 12/4/2023 1550    Acceptance, E,TB, VU,NR by EE at 12/1/2023 1525                         Point: Precautions (Done)       Learning Progress Summary             Patient Acceptance, E,TB, VU,DU by CS at 12/4/2023 1550                                          User Key       Initials Effective Dates Name Provider Type Discipline    EE 06/16/21 -  Katrin Garcia PT Physical Therapist PT    CS 09/22/22 -  Tiffany Sheth PT Physical Therapist PT                  PT Recommendation and Plan     Plan of Care Reviewed With: patient  Progress: improving  Outcome Evaluation: Pt received in bed upon arrival and agreeable to PT. Pt required mod A and increased time to reach sitting EOB. Pt stood requiring min A and ambulated 30' c RW requiring CGA. Pt demo's a very slow pace with shuffling gait at times. Cues for upright posture and foot clearance. Distance limited by fatigue and weakness. Pt returned to bed with all needs in reach. Pt will continue to benefit from skilled PT to address functional deficits.     Time Calculation:         PT Charges       Row Name 12/04/23 1551 12/04/23 1316          Time Calculation    Start Time 1528  -CS --     Stop Time 1538  -CS --     Time Calculation (min) 10 min  -CS --     PT Received On 12/04/23  -CS --     PT - Next Appointment 12/05/23  -CS 12/05/23  -CS        Time Calculation- PT    Total Timed Code Minutes- PT 8 minute(s)  -CS --        Timed Charges    24350 - PT Therapeutic Activity Minutes 8  -CS --        Total Minutes    Timed Charges Total Minutes 8  -CS --      Total Minutes 8  -CS --               User Key  (r) = Recorded By, (t) = Taken By, (c) = Cosigned By      Initials Name Provider Type    CS Tiffany Sheth, PT Physical Therapist                  Therapy Charges for Today       Code Description Service Date Service Provider Modifiers Qty    [id number]  PT THERAPEUTIC ACT EA 15 MIN 12/4/2023 Tiffany Sheth, PT GP 1            PT G-Codes  Outcome Measure Options: AM-PAC 6 Clicks Basic Mobility (PT)  AM-PAC 6 Clicks Score (PT): 16  PT Discharge Summary  Anticipated Discharge Disposition (PT): skilled nursing facility    Tiffany Sheth PT  12/4/2023

## 2023-12-04 NOTE — PLAN OF CARE
Goal Outcome Evaluation:  Plan of Care Reviewed With: patient        Progress: improving  Outcome Evaluation: Pt received in bed upon arrival and agreeable to PT. Pt required mod A and increased time to reach sitting EOB. Pt stood requiring min A and ambulated 30' c RW requiring CGA. Pt demo's a very slow pace with shuffling gait at times. Cues for upright posture and foot clearance. Distance limited by fatigue and weakness. Pt returned to bed with all needs in reach. Pt will continue to benefit from skilled PT to address functional deficits.      Anticipated Discharge Disposition (PT): skilled nursing facility

## 2023-12-04 NOTE — PROGRESS NOTES
[facility] Progress Note      Patient Name: Carlito Mo  : 1955  MRN: [id number]  Primary Care Physician:  Florencia Caballero MD  Date of admission: 2023    Subjective     Interval History:   Follow-up end-stage renal disease  Patient is feeling the same today denies any chest pain or shortness of air, no orthopnea or PND, no nausea or vomiting.    Review of Systems:   As noted above    Objective     Vitals:   Temp:  [97.7 °F (36.5 °C)-99.3 °F (37.4 °C)] 97.7 °F (36.5 °C)  Heart Rate:  [] 85  Resp:  [18-20] 18  BP: ()/(56-62) 140/62    Intake/Output Summary (Last 24 hours) at 2023 0854  Last data filed at 12/3/2023 1213  Gross per 24 hour   Intake 480 ml   Output 200 ml   Net 280 ml         Physical Exam:    General Appearance: alert, awake, chronically ill, no acute distress   Skin: warm and dry  HEENT: oral mucosa normal, nonicteric sclera  Neck: supple, no JVD  Lungs: Bilateral rhonchi, breathing effort not labored  Heart: Irregularly irregular, no rub  Abdomen: soft, nontender, nondistended  : no palpable bladder  Extremities: no edema, cyanosis or clubbing, functional AV fistula in the right forearm with good thrill and bruit  Neuro: More awake and alert moving all extremities        Scheduled Meds:     budesonide-formoterol, 2 puff, Inhalation, BID - RT  carvedilol, 6.25 mg, Oral, BID With Meals  cetirizine, 10 mg, Oral, Daily  insulin glargine, 2 Units, Subcutaneous, Nightly  insulin regular, 2-7 Units, Subcutaneous, 4x Daily AC & at Bedtime  levothyroxine, 75 mcg, Oral, Q AM  pantoprazole, 40 mg, Intravenous, BID  rosuvastatin, 40 mg, Oral, Daily  sodium bicarbonate, 650 mg, Oral, TID  sodium chloride, 500 mL, Intravenous, Once  sodium chloride, 10 mL, Intravenous, Q12H  sodium chloride, 10 mL, Intravenous, Q12H      IV Meds:   dilTIAZem, 5-15 mg/hr, Last Rate: Stopped (23 2200)        Results Reviewed:   I have personally reviewed the  results from the time of this admission to 12/4/2023 08:54 EST     Results from last 7 days   Lab Units 12/04/23 0259 12/03/23 0749 12/02/23 2034 11/30/23 0040 11/29/23  0756 11/28/23  2119   SODIUM mmol/L 138 137 137   < > 138 140   POTASSIUM mmol/L 4.8 4.6 4.3   < > 3.2* 4.0   CHLORIDE mmol/L 105 106 103   < > 101 97*   CO2 mmol/L 27.3 26.3 29.0   < > 26.3 26.3   BUN mg/dL 32* 21 14   < > 36* 28*   CREATININE mg/dL 2.96* 2.24* 1.68*   < > 3.24* 2.74*   CALCIUM mg/dL 8.2* 8.4* 8.3*   < > 8.8 9.5   BILIRUBIN mg/dL  --   --   --   --  0.3 0.4   ALK PHOS U/L  --   --   --   --  66 81   ALT (SGPT) U/L  --   --   --   --  23 31   AST (SGOT) U/L  --   --   --   --  19 27   GLUCOSE mg/dL 75 92 174*   < > 133* 243*    < > = values in this interval not displayed.       Estimated Creatinine Clearance: 23.3 mL/min (A) (by C-G formula based on SCr of 2.96 mg/dL (H)).    Results from last 7 days   Lab Units 12/04/23 0259 12/02/23 2034 12/02/23 0320 12/01/23 0903 11/30/23 0040 11/28/23  2119   MAGNESIUM mg/dL 1.8  --   --   --  2.0 2.0   PHOSPHORUS mg/dL 2.2* 1.1* 2.5   < > 3.0  --     < > = values in this interval not displayed.             Results from last 7 days   Lab Units 12/04/23 0259 12/03/23 0749 12/03/23  0002 12/02/23 2034 12/02/23 0320 12/01/23  1547 12/01/23 0903   WBC 10*3/mm3 9.56 11.80*  --  7.66 6.92  --  6.62   HEMOGLOBIN g/dL 8.0* 8.6* 8.1* 8.8* 8.1*   < > 8.6*   PLATELETS 10*3/mm3 249 257  --  263 253  --  260    < > = values in this interval not displayed.       Results from last 7 days   Lab Units 11/28/23  2119   INR  1.18*       Assessment / Plan     ASSESSMENT:  End-stage renal disease and diabetic nephropathy on maintenance hemodialysis every Tuesday, Thursday and Saturday,He appears to be euvolemic, potassium is 4.8, plan for dialysis tomorrow  Hypophosphatemia phosphorus is better up to 1.8 today we will monitor  Atrial fibrillation with controlled ventricular rate currently off  diltiazem  Diabetes mellitus type 2 with renal complication  Hypertension with chronic kidney disease, well controlled. Coreg increased to 6.25 mg bid.  Anemia of chronic kidney disease, hemoglobin today is 8 treated with long-acting HAI as an outpatient  Altered mental status.  Resolved  History of coronary artery disease and prior CVA.       PLAN:  Continue the same treatment  Hemodialysis tomorrow.  Surveillance labs    I reviewed the chart and other providers notes, I reviewed labs  I discussed the case with the patient.  Copied text in this note has been reviewed and is accurate as of 12/04/23.       Thank you for involving us in the care of Carlito oM.  Please feel free to call with any questions.    Cameron Olguin MD  12/04/23  08:54 [facility]    [facility]  [phone]    Please note that portions of this note were completed with a voice recognition program.

## 2023-12-04 NOTE — PLAN OF CARE
Problem: Infection Progression (Sepsis/Septic Shock)  Goal: Absence of Infection Signs and Symptoms  Intervention: Initiate Sepsis Management  Recent Flowsheet Documentation  Taken 12/3/2023 1800 by Alejandra Downey RN  Infection Prevention: single patient room provided  Taken 12/3/2023 1403 by Alejandra Downey RN  Infection Prevention: single patient room provided  Taken 12/3/2023 1213 by Alejandra Downey RN  Infection Prevention: single patient room provided  Taken 12/3/2023 1022 by Alejandra Downey RN  Infection Prevention: single patient room provided  Taken 12/3/2023 0904 by Alejandra Downey RN  Infection Prevention:   hand hygiene promoted   single patient room provided     Problem: Infection Progression (Sepsis/Septic Shock)  Goal: Absence of Infection Signs and Symptoms  Intervention: Promote Recovery  Recent Flowsheet Documentation  Taken 12/3/2023 1800 by Alejandra Downey RN  Activity Management: activity encouraged  Taken 12/3/2023 1403 by Alejandra Downey RN  Activity Management: activity encouraged  Taken 12/3/2023 1213 by Alejandra Downey RN  Activity Management: activity encouraged  Taken 12/3/2023 1022 by Alejandra Downey RN  Activity Management: activity encouraged  Taken 12/3/2023 0904 by Alejandra Downey RN  Activity Management: activity encouraged  Sleep/Rest Enhancement: consistent schedule promoted   Goal Outcome Evaluation:  Plan of Care Reviewed With: patient        Progress: no change  Outcome Evaluation: Patient very tired fair appetite post dialysis AO x2 total care turn position take his medication whole

## 2023-12-04 NOTE — PROGRESS NOTES
Name: Carlito Mo ADMIT: 2023   : 1955  PCP: Florencia Caballero MD    MRN: [id number] LOS: 5 days   AGE/SEX: 68 y.o. male  ROOM: [facility]     Subjective   Subjective     Dyspnea fair  EGD today  +gastritis  Pt is hungry        Objective   Objective   Vital Signs  Temp:  [97.5 °F (36.4 °C)-99.3 °F (37.4 °C)] 97.7 °F (36.5 °C)  Heart Rate:  [70-90] 90  Resp:  [16-20] 20  BP: ()/(56-93) 152/67  SpO2:  [95 %-100 %] 97 %  on  Flow (L/min):  [6] 6;   Device (Oxygen Therapy): room air  Body mass index is 24.59 kg/m².  Physical Exam  Constitutional:       General: He is not in acute distress.     Appearance: He is ill-appearing. He is not toxic-appearing.   Cardiovascular:      Rate and Rhythm: Tachycardia present.   Pulmonary:      Effort: Pulmonary effort is normal.      Breath sounds: Normal breath sounds.   Abdominal:      General: Bowel sounds are normal.      Palpations: Abdomen is soft.   Musculoskeletal:         General: No tenderness.      Right lower leg: No edema.      Left lower leg: No edema.   Neurological:      Mental Status: He is alert.   Psychiatric:         Mood and Affect: Mood normal.         Behavior: Behavior normal.     Chronically ill  Flat affect    Results Review     I reviewed the patient's new clinical results.  Results from last 7 days   Lab Units 2349 23  0002 23  0320   WBC 10*3/mm3 9.56 11.80*  --  7.66 6.92   HEMOGLOBIN g/dL 8.0* 8.6* 8.1* 8.8* 8.1*   PLATELETS 10*3/mm3 249 257  --  263 253     Results from last 7 days   Lab Units 23  0749 23  0320   SODIUM mmol/L 138 137 137 137   POTASSIUM mmol/L 4.8 4.6 4.3 4.3   CHLORIDE mmol/L 105 106 103 105   CO2 mmol/L 27.3 26.3 29.0 24.4   BUN mg/dL 32* 21 14 33*   CREATININE mg/dL 2.96* 2.24* 1.68* 3.14*   GLUCOSE mg/dL 75 92 174* 238*   Estimated Creatinine Clearance: 23.3 mL/min (A) (by C-G formula based on SCr of 2.96 mg/dL  (H)).  Results from last 7 days   Lab Units 12/04/23 0259 12/02/23 2034 12/02/23 0320 12/01/23 0903 11/30/23 0040 11/29/23 0756 11/28/23  2119   ALBUMIN g/dL 2.8* 3.3* 2.8* 3.0*   < > 3.2* 3.9   BILIRUBIN mg/dL  --   --   --   --   --  0.3 0.4   ALK PHOS U/L  --   --   --   --   --  66 81   AST (SGOT) U/L  --   --   --   --   --  19 27   ALT (SGPT) U/L  --   --   --   --   --  23 31    < > = values in this interval not displayed.     Results from last 7 days   Lab Units 12/04/23 0259 12/03/23 0749 12/02/23 2034 12/02/23 0320 12/01/23 0903 11/30/23 0040 11/29/23 0756 11/28/23  2119   CALCIUM mg/dL 8.2* 8.4* 8.3* 8.3* 8.3* 8.6   < > 9.5   ALBUMIN g/dL 2.8*  --  3.3* 2.8* 3.0* 3.0*   < > 3.9   MAGNESIUM mg/dL 1.8  --   --   --   --  2.0  --  2.0   PHOSPHORUS mg/dL 2.2*  --  1.1* 2.5 1.5* 3.0   < >  --     < > = values in this interval not displayed.     Results from last 7 days   Lab Units 11/29/23 0026 11/28/23 2120   LACTATE mmol/L 1.9 3.5*     COVID19   Date Value Ref Range Status   05/02/2023 Not Detected Not Detected - Ref. Range Final   03/07/2023 Not Detected Not Detected - Ref. Range Final   12/01/2022 Detected (C) Not Detected - Ref. Range Final   07/24/2022 Not Detected Not Detected - Ref. Range Final   06/04/2022 Not Detected Not Detected - Ref. Range Final     Hemoglobin A1C   Date/Time Value Ref Range Status   12/02/2023 0320 6.90 (H) 4.80 - 5.60 % Final     Glucose   Date/Time Value Ref Range Status   12/04/2023 1217 83 70 - 130 mg/dL Final   12/04/2023 0625 88 70 - 130 mg/dL Final   12/03/2023 2110 126 70 - 130 mg/dL Final   12/03/2023 1621 226 (H) 70 - 130 mg/dL Final   12/03/2023 1143 117 70 - 130 mg/dL Final   12/03/2023 0646 95 70 - 130 mg/dL Final   12/02/2023 2048 222 (H) 70 - 130 mg/dL Final       XR Chest 1 View  Narrative: SINGLE VIEW OF THE CHEST     HISTORY: Shortness of breath     COMPARISON: November 2030 is 23     FINDINGS:  The patient status post median sternotomy with  coronary artery bypass  grafting. There is cardiomegaly. There is calcification of the aorta. No  pneumothorax or pleural effusion is seen. No definite acute infiltrates  are identified.     Impression: No acute findings.     This report was finalized on 11/28/2023 9:51 PM by Dr. Alice Allen M.D on Workstation: [id number]       Scheduled Medications  budesonide-formoterol, 2 puff, Inhalation, BID - RT  carvedilol, 6.25 mg, Oral, BID With Meals  cetirizine, 10 mg, Oral, Daily  insulin glargine, 2 Units, Subcutaneous, Nightly  insulin regular, 2-7 Units, Subcutaneous, 4x Daily AC & at Bedtime  levothyroxine, 75 mcg, Oral, Q AM  pantoprazole, 40 mg, Intravenous, BID  rosuvastatin, 40 mg, Oral, Daily  sodium bicarbonate, 650 mg, Oral, TID  sodium chloride, 500 mL, Intravenous, Once  sodium chloride, 10 mL, Intravenous, Q12H  sodium chloride, 10 mL, Intravenous, Q12H    Infusions  dilTIAZem, 5-15 mg/hr, Last Rate: Stopped (12/01/23 2200)  sodium chloride, 1,000 mL, Last Rate: Stopped (12/04/23 1155)    Diet  Diet: Diabetic Diets; Consistent Carbohydrate; Texture: Regular Texture (IDDSI 7); Fluid Consistency: Thin (IDDSI 0)       Assessment/Plan     Active Hospital Problems    Diagnosis  POA    **Atrial fibrillation with rapid ventricular response [I48.91]  Yes    ESRD (end stage renal disease) [N18.6]  Yes    Nausea & vomiting [R11.2]  Yes    Hemoptysis [R04.2]  Yes    Coffee ground emesis [K92.0]  Unknown    Embolic stroke [I63.9]  Yes    Coronary artery disease involving native coronary artery of native heart without angina pectoris [I25.10]  Yes    Chronic combined systolic and diastolic CHF (congestive heart failure) [I50.42]  Yes    Acquired hypothyroidism [E03.9]  Yes    Benign prostatic hyperplasia with nocturia [N40.1, R35.1]  Yes    Type 2 diabetes mellitus, with long-term current use of insulin [E11.9, Z79.4]  Not Applicable      Resolved Hospital Problems   No resolved problems to display.       68  y.o. male admitted with Atrial fibrillation with rapid ventricular response.    GI bleeding/coffee ground emesis/diarrhea-thought to be secondary to viral gastroenteritis with perhaps a small giancarlo-lr tear. eliquis on hold. On iv ppi bid. EGD 12/4 mild gastritis. Will restart eliquis tonight and monitor.           Afib with rvr-s/p diltiazem drip. On low dose coreg which is being adjusted.    Cardiogenic hypotension due to afib-improved  Elevated troponin due to ESRD and type 2 NSTEMI from rvr-no plans for ischemic workup at this time.  Metabolic encephalopathy-resolved  Coronary artery disease s/p CABG in 2001-statin/bb. Asa on hold  Chronic combined systolic and diastolic heart failure-bb. Torsemide is held. euvolemic  ESRD on HD T, TH, Sa-nephrology is managing dialysis  Anemia of chronic disease-on HAI as an outpatient  Type 2 diabetes-A1c 6.9. He is on a very low dose of glargine as an outpatient. Continue sliding scale.  COPD with chronic respiratory failure with hypoxia on home O2-symbicort  Hypothyroidism-synthroid  History of embolic CVA-eliquis on hold as above  Nasal congestion-zyrtec and saline nasal spray  SCDs for DVT prophylaxis.  Full code.  Discussed with patient.  Anticipate discharge to SNU facility timing yet to be determined. Perhaps  12/5 or 12/6 will need precert    GURJIT Hogue MD  [facility]  12/04/23  15:10 EST

## 2023-12-04 NOTE — ANESTHESIA PREPROCEDURE EVALUATION
Anesthesia Evaluation     Patient summary reviewed and Nursing notes reviewed                Airway   Mallampati: III  TM distance: >3 FB  Neck ROM: full  Dental    (+) poor dentition    Pulmonary - negative pulmonary ROS   (+) COPD,sleep apnea  Cardiovascular - negative cardio ROS    ECG reviewed  Rhythm: irregular  Rate: normal    (+) hypertension, past MI , CAD, CABG, dysrhythmias Atrial Fib, CHF , hyperlipidemia      Neuro/Psych- negative ROS  (+) TIA, CVA, weakness, psychiatric history Anxiety and Depression  GI/Hepatic/Renal/Endo - negative ROS   (+) GERD, GI bleeding , renal disease- dialysis and ESRD, diabetes mellitus type 2 using insulin, thyroid problem hypothyroidism    Musculoskeletal (-) negative ROS    Abdominal    Substance History - negative use     OB/GYN negative ob/gyn ROS         Other   arthritis,   history of cancer                  Anesthesia Plan    ASA 4     MAC     (PAF in afib admitted with coffee ground emesis  Dialysis patient  Placed on diltiazem drip for rate control    Shunt for dialtsis  T Th Sat dialysis n  Last dialyzed on 12/2/23)  intravenous induction     Anesthetic plan, risks, benefits, and alternatives have been provided, discussed and informed consent has been obtained with: patient.    Plan discussed with CRNA.      CODE STATUS:    Code Status (Patient has no pulse and is not breathing): CPR (Attempt to Resuscitate)  Medical Interventions (Patient has pulse or is breathing): Full Support

## 2023-12-04 NOTE — ANESTHESIA POSTPROCEDURE EVALUATION
Patient: Carlito Mo    Procedure Summary       Date: 12/04/23 Room / Location: [facility] ENDOSCOPY 8 / [facility] ENDOSCOPY    Anesthesia Start: 1058 Anesthesia Stop: 1131    Procedure: ESOPHAGOGASTRODUODENOSCOPY with bx (Esophagus) Diagnosis:       Nausea and vomiting, unspecified vomiting type      Coffee ground emesis      (Nausea and vomiting, unspecified vomiting type [R11.2])      (Coffee ground emesis [K92.0])    Surgeons: Quoc Elmore MD Provider: Heladio Akins MD    Anesthesia Type: MAC ASA Status: 4            Anesthesia Type: MAC    Vitals  Vitals Value Taken Time   /93 12/04/23 1131   Temp     Pulse 89 12/04/23 1143   Resp 16 12/04/23 1141   SpO2 97 % 12/04/23 1143   Vitals shown include unfiled device data.        Post Anesthesia Care and Evaluation    Patient location during evaluation: PACU  Patient participation: complete - patient participated  Level of consciousness: awake and alert  Pain management: adequate    Airway patency: patent  Anesthetic complications: No anesthetic complications    Cardiovascular status: acceptable  Respiratory status: acceptable  Hydration status: acceptable    Comments: --------------------            12/04/23               1141     --------------------   BP:                  Pulse:      81       Resp:       16       Temp:                SpO2:      98%      --------------------

## 2023-12-04 NOTE — SIGNIFICANT NOTE
12/04/23 1316   OTHER   Discipline physical therapist   Rehab Time/Intention   Session Not Performed patient unavailable for treatment  (pt COLLEEN for EGD; will f/u as time allows or next service date)   Therapy Assessment/Plan (PT)   Criteria for Skilled Interventions Met (PT) yes   Recommendation   PT - Next Appointment 12/05/23

## 2023-12-05 LAB
ALBUMIN SERPL-MCNC: 2.8 G/DL (ref 3.5–5.2)
ANION GAP SERPL CALCULATED.3IONS-SCNC: 5 MMOL/L (ref 5–15)
BASOPHILS # BLD AUTO: 0.01 10*3/MM3 (ref 0–0.2)
BASOPHILS NFR BLD AUTO: 0.1 % (ref 0–1.5)
BUN SERPL-MCNC: 39 MG/DL (ref 8–23)
BUN/CREAT SERPL: 12.1 (ref 7–25)
CALCIUM SPEC-SCNC: 8.5 MG/DL (ref 8.6–10.5)
CHLORIDE SERPL-SCNC: 106 MMOL/L (ref 98–107)
CO2 SERPL-SCNC: 27 MMOL/L (ref 22–29)
CREAT SERPL-MCNC: 3.22 MG/DL (ref 0.76–1.27)
DEPRECATED RDW RBC AUTO: 41.4 FL (ref 37–54)
EGFRCR SERPLBLD CKD-EPI 2021: 20.2 ML/MIN/1.73
EOSINOPHIL # BLD AUTO: 0.17 10*3/MM3 (ref 0–0.4)
EOSINOPHIL NFR BLD AUTO: 1.9 % (ref 0.3–6.2)
ERYTHROCYTE [DISTWIDTH] IN BLOOD BY AUTOMATED COUNT: 13.5 % (ref 12.3–15.4)
GLUCOSE BLDC GLUCOMTR-MCNC: 136 MG/DL (ref 70–130)
GLUCOSE BLDC GLUCOMTR-MCNC: 169 MG/DL (ref 70–130)
GLUCOSE BLDC GLUCOMTR-MCNC: 77 MG/DL (ref 70–130)
GLUCOSE SERPL-MCNC: 87 MG/DL (ref 65–99)
HBV SURFACE AB SER RIA-ACNC: NORMAL
HBV SURFACE AG SERPL QL IA: NORMAL
HCT VFR BLD AUTO: 26 % (ref 37.5–51)
HGB BLD-MCNC: 7.8 G/DL (ref 13–17.7)
IMM GRANULOCYTES # BLD AUTO: 0.07 10*3/MM3 (ref 0–0.05)
IMM GRANULOCYTES NFR BLD AUTO: 0.8 % (ref 0–0.5)
LAB AP CASE REPORT: NORMAL
LYMPHOCYTES # BLD AUTO: 0.92 10*3/MM3 (ref 0.7–3.1)
LYMPHOCYTES NFR BLD AUTO: 10.5 % (ref 19.6–45.3)
MCH RBC QN AUTO: 25.8 PG (ref 26.6–33)
MCHC RBC AUTO-ENTMCNC: 30 G/DL (ref 31.5–35.7)
MCV RBC AUTO: 86.1 FL (ref 79–97)
MONOCYTES # BLD AUTO: 0.59 10*3/MM3 (ref 0.1–0.9)
MONOCYTES NFR BLD AUTO: 6.7 % (ref 5–12)
NEUTROPHILS NFR BLD AUTO: 7.04 10*3/MM3 (ref 1.7–7)
NEUTROPHILS NFR BLD AUTO: 80 % (ref 42.7–76)
NRBC BLD AUTO-RTO: 0 /100 WBC (ref 0–0.2)
PATH REPORT.FINAL DX SPEC: NORMAL
PATH REPORT.GROSS SPEC: NORMAL
PHOSPHATE SERPL-MCNC: 2.9 MG/DL (ref 2.5–4.5)
PLATELET # BLD AUTO: 262 10*3/MM3 (ref 140–450)
PMV BLD AUTO: 9.7 FL (ref 6–12)
POTASSIUM SERPL-SCNC: 5 MMOL/L (ref 3.5–5.2)
RBC # BLD AUTO: 3.02 10*6/MM3 (ref 4.14–5.8)
SODIUM SERPL-SCNC: 138 MMOL/L (ref 136–145)
WBC NRBC COR # BLD AUTO: 8.8 10*3/MM3 (ref 3.4–10.8)

## 2023-12-05 PROCEDURE — 86704 HEP B CORE ANTIBODY TOTAL: CPT | Performed by: STUDENT IN AN ORGANIZED HEALTH CARE EDUCATION/TRAINING PROGRAM

## 2023-12-05 PROCEDURE — 87340 HEPATITIS B SURFACE AG IA: CPT | Performed by: STUDENT IN AN ORGANIZED HEALTH CARE EDUCATION/TRAINING PROGRAM

## 2023-12-05 PROCEDURE — 80069 RENAL FUNCTION PANEL: CPT | Performed by: INTERNAL MEDICINE

## 2023-12-05 PROCEDURE — 63710000001 INSULIN GLARGINE PER 5 UNITS: Performed by: INTERNAL MEDICINE

## 2023-12-05 PROCEDURE — 63710000001 INSULIN REGULAR HUMAN PER 5 UNITS: Performed by: INTERNAL MEDICINE

## 2023-12-05 PROCEDURE — 94799 UNLISTED PULMONARY SVC/PX: CPT

## 2023-12-05 PROCEDURE — 85025 COMPLETE CBC W/AUTO DIFF WBC: CPT | Performed by: INTERNAL MEDICINE

## 2023-12-05 PROCEDURE — 99232 SBSQ HOSP IP/OBS MODERATE 35: CPT

## 2023-12-05 PROCEDURE — 82948 REAGENT STRIP/BLOOD GLUCOSE: CPT

## 2023-12-05 PROCEDURE — 94664 DEMO&/EVAL PT USE INHALER: CPT

## 2023-12-05 PROCEDURE — 99232 SBSQ HOSP IP/OBS MODERATE 35: CPT | Performed by: NURSE PRACTITIONER

## 2023-12-05 PROCEDURE — 94761 N-INVAS EAR/PLS OXIMETRY MLT: CPT

## 2023-12-05 PROCEDURE — 86706 HEP B SURFACE ANTIBODY: CPT | Performed by: STUDENT IN AN ORGANIZED HEALTH CARE EDUCATION/TRAINING PROGRAM

## 2023-12-05 RX ORDER — PANTOPRAZOLE SODIUM 40 MG/1
40 TABLET, DELAYED RELEASE ORAL
Status: DISCONTINUED | OUTPATIENT
Start: 2023-12-05 | End: 2023-12-11 | Stop reason: HOSPADM

## 2023-12-05 RX ADMIN — Medication 10 ML: at 20:59

## 2023-12-05 RX ADMIN — ROSUVASTATIN CALCIUM 40 MG: 40 TABLET, FILM COATED ORAL at 08:37

## 2023-12-05 RX ADMIN — BUDESONIDE AND FORMOTEROL FUMARATE DIHYDRATE 2 PUFF: 160; 4.5 AEROSOL RESPIRATORY (INHALATION) at 21:13

## 2023-12-05 RX ADMIN — Medication 10 ML: at 08:37

## 2023-12-05 RX ADMIN — SODIUM BICARBONATE 650 MG: 650 TABLET, ORALLY DISINTEGRATING ORAL at 20:57

## 2023-12-05 RX ADMIN — CETIRIZINE HYDROCHLORIDE 10 MG: 10 TABLET ORAL at 08:37

## 2023-12-05 RX ADMIN — CARVEDILOL 6.25 MG: 6.25 TABLET, FILM COATED ORAL at 20:57

## 2023-12-05 RX ADMIN — PHENOL 1 SPRAY: 1.5 LIQUID ORAL at 08:41

## 2023-12-05 RX ADMIN — SODIUM BICARBONATE 650 MG: 650 TABLET, ORALLY DISINTEGRATING ORAL at 08:37

## 2023-12-05 RX ADMIN — CARVEDILOL 6.25 MG: 6.25 TABLET, FILM COATED ORAL at 08:37

## 2023-12-05 RX ADMIN — PANTOPRAZOLE SODIUM 40 MG: 40 INJECTION, POWDER, FOR SOLUTION INTRAVENOUS at 08:37

## 2023-12-05 RX ADMIN — INSULIN HUMAN 2 UNITS: 100 INJECTION, SOLUTION PARENTERAL at 20:58

## 2023-12-05 RX ADMIN — BUDESONIDE AND FORMOTEROL FUMARATE DIHYDRATE 2 PUFF: 160; 4.5 AEROSOL RESPIRATORY (INHALATION) at 06:59

## 2023-12-05 RX ADMIN — APIXABAN 2.5 MG: 2.5 TABLET, FILM COATED ORAL at 08:37

## 2023-12-05 RX ADMIN — LEVOTHYROXINE SODIUM 75 MCG: 75 TABLET ORAL at 05:58

## 2023-12-05 RX ADMIN — INSULIN GLARGINE 2 UNITS: 100 INJECTION, SOLUTION SUBCUTANEOUS at 20:57

## 2023-12-05 RX ADMIN — APIXABAN 2.5 MG: 2.5 TABLET, FILM COATED ORAL at 20:57

## 2023-12-05 NOTE — CASE MANAGEMENT/SOCIAL WORK
Continued Stay Note  [facility]     Patient Name: Carlito Mo  MRN: [id number]  Today's Date: 12/5/2023    Admit Date: 11/28/2023    Plan: [facility] when HD chair time finalized, and pre-cert obtained   Discharge Plan       Row Name 12/05/23 0928       Plan    Plan [facility] when HD chair time finalized, and pre-cert obtained    Patient/Family in Agreement with Plan yes    Plan Comments Discussed pt with MD in CCP office, pt is ready for DC when HD is arranged at [facility]. Jean-Paul/Noni notified and awaiting final chair time/HD setup at [facility], he will notify CCP when finalized. Partial packet in CCP office, pharmacy updated in [employer]. Pt will need pre-cert. Alejandra BENZ/CCP                   Discharge Codes    No documentation.                 Expected Discharge Date and Time       Expected Discharge Date Expected Discharge Time    Dec 6, 2023               Marivel Hernandez RN

## 2023-12-05 NOTE — CASE MANAGEMENT/SOCIAL WORK
Continued Stay Note  [facility]     Patient Name: Carlito Mo  MRN: [id number]  Today's Date: 12/5/2023    Admit Date: 11/28/2023    Plan: [facility] when HD setup complete and pre-cert obtained   Discharge Plan       Row Name 12/05/23 1547       Plan    Plan [facility] when HD setup complete and pre-cert obtained    Patient/Family in Agreement with Plan yes    Plan Comments Jean-Paul/Noni notified CCP pt needs new Hepatitis lab work drawn for HD setup. MD notified and orders for Hepatitis B Core, Antigen and Antibody ordered. Alejandra BENZ/CCP      Row Name 12/05/23 1449       Plan    Plan [facility] when HD chair setup complete and pre-cert obtained.    Patient/Family in Agreement with Plan yes    Plan Comments Jean-Paul/[facility] notified CCP need Hepatitis antibody and Hepatitis Core labs to complete HD setup. CCP notified Ashley/C who faxed most recent labs to CCP. CCP emailed labs to Jean-Paul/Noni. Jean-Paul/Noni will notify CCP if new lab work is needed. Alejandra BENZ/CCP                   Discharge Codes    No documentation.                 Expected Discharge Date and Time       Expected Discharge Date Expected Discharge Time    Dec 6, 2023               Marivel Hernandez RN

## 2023-12-05 NOTE — PROGRESS NOTES
Gastroenterology   Inpatient Progress Note    Reason for Follow Up: Coffee ground emesis    Subjective  Interval History:       No overnight concerns.  Reviewed 12/4 EGD evaluation findings including 3 cm hiatal hernia, LA grade B esophagitis, endoscopically normal-appearing stomach and duodenum.    Hgb 7.8       Current Facility-Administered Medications:     acetaminophen (TYLENOL) tablet 650 mg, 650 mg, Oral, Q4H PRN **OR** acetaminophen (TYLENOL) 160 MG/5ML oral solution 650 mg, 650 mg, Oral, Q4H PRN **OR** acetaminophen (TYLENOL) suppository 650 mg, 650 mg, Rectal, Q4H PRN, Quoc Elmore MD    acetaminophen (TYLENOL) tablet 650 mg, 650 mg, Oral, Q4H PRN **OR** acetaminophen (TYLENOL) suppository 650 mg, 650 mg, Rectal, Q4H PRN, Quoc Elmore MD    apixaban (ELIQUIS) tablet 2.5 mg, 2.5 mg, Oral, Q12H, Asim Hogue MD, 2.5 mg at 12/05/23 0837    budesonide-formoterol (SYMBICORT) 160-4.5 MCG/ACT inhaler 2 puff, 2 puff, Inhalation, BID - RT, Quoc Elmore MD, 2 puff at 12/05/23 0659    calcium carbonate (TUMS) chewable tablet 500 mg (200 mg elemental), 2 tablet, Oral, BID PRN, Quoc Elmore MD    carvedilol (COREG) tablet 6.25 mg, 6.25 mg, Oral, BID With Meals, Quoc Elmore MD, 6.25 mg at 12/05/23 0837    cetirizine (zyrTEC) tablet 10 mg, 10 mg, Oral, Daily, Quoc Elmore MD, 10 mg at 12/05/23 0837    dextrose (D50W) (25 g/50 mL) IV injection 25 g, 25 g, Intravenous, Q15 Min PRN, Quoc Elmore MD    dextrose (D50W) (25 g/50 mL) IV injection 25 g, 25 g, Intravenous, Q15 Min PRN, Quoc Elmore MD    dextrose (GLUTOSE) oral gel 15 g, 15 g, Oral, Q15 Min PRN, Quoc Elmore MD    glucagon (GLUCAGEN) injection 1 mg, 1 mg, Intramuscular, Q15 Min PRN, Quoc Elmore MD    insulin glargine (LANTUS, SEMGLEE) injection 2 Units, 2 Units, Subcutaneous, Nightly, Quoc Elmore MD, 2 Units at 12/04/23 3907    insulin regular (humuLIN R,novoLIN R)  injection 2-7 Units, 2-7 Units, Subcutaneous, 4x Daily AC & at Bedtime, Quoc Elmore MD, 3 Units at 12/04/23 2154    ipratropium-albuterol (DUO-NEB) nebulizer solution 3 mL, 3 mL, Nebulization, Q6H PRN, Quoc Elmore MD    levothyroxine (SYNTHROID, LEVOTHROID) tablet 75 mcg, 75 mcg, Oral, Q AM, Quoc Elmore MD, 75 mcg at 12/05/23 0558    nitroglycerin (NITROSTAT) SL tablet 0.4 mg, 0.4 mg, Sublingual, Q5 Min PRN, Quoc Elmore MD    ondansetron (ZOFRAN) tablet 4 mg, 4 mg, Oral, Q6H PRN **OR** ondansetron (ZOFRAN) injection 4 mg, 4 mg, Intravenous, Q6H PRN, Quoc Elmore MD    pantoprazole (PROTONIX) injection 40 mg, 40 mg, Intravenous, BID, Quoc Elmore MD, 40 mg at 12/05/23 0837    phenol (CHLORASEPTIC) 1.4 % liquid 1 spray, 1 spray, Mouth/Throat, Q2H PRN, Quoc Elmore MD, 1 spray at 12/05/23 0841    rosuvastatin (CRESTOR) tablet 40 mg, 40 mg, Oral, Daily, Quoc Elmore MD, 40 mg at 12/05/23 0837    sodium bicarbonate tablet 650 mg, 650 mg, Oral, TID, Quoc Elmore MD, 650 mg at 12/05/23 0837    sodium chloride 0.9 % bolus 500 mL, 500 mL, Intravenous, Once, Quoc Elmore MD    sodium chloride 0.9 % flush 10 mL, 10 mL, Intravenous, PRN, Quoc Elmore MD    Insert Peripheral IV, , , Once **AND** sodium chloride 0.9 % flush 10 mL, 10 mL, Intravenous, PRN, Quoc Elmore MD    sodium chloride 0.9 % flush 10 mL, 10 mL, Intravenous, Q12H, Quoc Elmore MD, 10 mL at 12/05/23 0837    sodium chloride 0.9 % flush 10 mL, 10 mL, Intravenous, PRN, Quoc Elmore MD    sodium chloride 0.9 % flush 10 mL, 10 mL, Intravenous, Q12H, Quoc Elmore MD, 10 mL at 12/05/23 0837    sodium chloride 0.9 % flush 10 mL, 10 mL, Intravenous, PRN, Quoc Elmore MD    sodium chloride 0.9 % infusion 1,000 mL, 1,000 mL, Intravenous, Continuous, Quoc Elmore MD, Stopped at 12/04/23 1155    sodium chloride 0.9 % infusion 40 mL, 40  mL, Intravenous, Ramírez VELAZQUEZ Frederick J, MD    sodium chloride 0.9 % infusion 40 mL, 40 mL, IntravenousMARCUS Hardin, Frederick J, MD    sodium chloride nasal spray 1 spray, 1 spray, Each Nare, Ramírez VELAZQUEZ Frederick J, MD, 1 spray at 12/02/23 2058  Review of Systems:               The following systems were reviewed and negative;  constitution and gastrointestinal    Objective     Vital Signs  Temp:  [97.5 °F (36.4 °C)-98.4 °F (36.9 °C)] 97.7 °F (36.5 °C)  Heart Rate:  [73-90] 78  Resp:  [16-20] 16  BP: (124-168)/(55-74) 148/56  Body mass index is 23.39 kg/m².                  General Appearance:  awake, alert, oriented, in no acute distress  Abdomen:  Soft, non-tender, normal bowel sounds; no bruits, organomegaly or masses.                Results Review:                I reviewed the patient's new clinical results.    Results from last 7 days   Lab Units 12/05/23  0318 12/04/23  0259 12/03/23  0749   WBC 10*3/mm3 8.80 9.56 11.80*   HEMOGLOBIN g/dL 7.8* 8.0* 8.6*   HEMATOCRIT % 26.0* 26.2* 27.0*   PLATELETS 10*3/mm3 262 249 257     Results from last 7 days   Lab Units 12/05/23  0318 12/04/23  0259 12/03/23  0749 11/30/23  0040 11/29/23  0756 11/28/23  2119   SODIUM mmol/L 138 138 137   < > 138 140   POTASSIUM mmol/L 5.0 4.8 4.6   < > 3.2* 4.0   CHLORIDE mmol/L 106 105 106   < > 101 97*   CO2 mmol/L 27.0 27.3 26.3   < > 26.3 26.3   BUN mg/dL 39* 32* 21   < > 36* 28*   CREATININE mg/dL 3.22* 2.96* 2.24*   < > 3.24* 2.74*   CALCIUM mg/dL 8.5* 8.2* 8.4*   < > 8.8 9.5   BILIRUBIN mg/dL  --   --   --   --  0.3 0.4   ALK PHOS U/L  --   --   --   --  66 81   ALT (SGPT) U/L  --   --   --   --  23 31   AST (SGOT) U/L  --   --   --   --  19 27   GLUCOSE mg/dL 87 75 92   < > 133* 243*    < > = values in this interval not displayed.     Results from last 7 days   Lab Units 11/28/23 2119   INR  1.18*     Lab Results   Lab Value Date/Time    LIPASE 27 11/28/2023 2119    LIPASE 31 11/24/2023 1836    LIPASE 33 12/13/2021 1614        Radiology:  XR Chest 1 View   Final Result   No acute findings.       This report was finalized on 11/28/2023 9:51 PM by Dr. Alice Allen M.D on Workstation: [id number]              Assessment & Plan     Active Hospital Problems    Diagnosis     **Atrial fibrillation with rapid ventricular response     ESRD (end stage renal disease)     Nausea & vomiting     Hemoptysis     Coffee ground emesis     Embolic stroke     Coronary artery disease involving native coronary artery of native heart without angina pectoris     Chronic combined systolic and diastolic CHF (congestive heart failure)     Acquired hypothyroidism     Benign prostatic hyperplasia with nocturia     Type 2 diabetes mellitus, with long-term current use of insulin        Assessment:  ESRD on hemodialysis  Nausea and vomiting  Coffee-ground emesis   mild anemia   nausea and vomiting.  Resolved  Elevated troponin level with cardiology following    These problems are new to me    Plan:  Reviewed EGD evaluation findings of hiatal hernia and LA grade B esophagitis.  Biopsies consistent with chronic inflammation without evidence of intestinal metaplasia or H. pylori   discussed importance of avoiding food and liquid intake within 2 to 3 hours of going to bed to reduce nighttime heartburn.  Continue to monitor for evidence of overt GI bleeding after restarting AC.     I discussed the patients findings and my recommendations with patient.          JI Magaña  [facility]  [address] [place] [address]

## 2023-12-05 NOTE — CASE MANAGEMENT/SOCIAL WORK
Continued Stay Note  [facility]     Patient Name: Carlito Mo  MRN: [id number]  Today's Date: 12/5/2023    Admit Date: 11/28/2023    Plan: [facility] when HD chair setup complete and pre-cert obtained.   Discharge Plan       Row Name 12/05/23 1449       Plan    Plan [facility] when HD chair setup complete and pre-cert obtained.    Patient/Family in Agreement with Plan yes    Plan Comments Jean-Paul/[facility] notified CCP need Hepatitis antibody and Hepatitis Core labs to complete HD setup. CCP notified Ashley/C who faxed most recent labs to CCP. CCP emailed labs to Jean-Paul/Noni. Jean-Paul/Noni will notify CCP if new lab work is needed. Alejandra BENZ/CCP                   Discharge Codes    No documentation.                 Expected Discharge Date and Time       Expected Discharge Date Expected Discharge Time    Dec 6, 2023               Marivel Hernandez RN

## 2023-12-05 NOTE — PROGRESS NOTES
[facility] Progress Note      Patient Name: Carlito Mo  : 1955  MRN: [id number]  Primary Care Physician:  Florencia Caballero MD  Date of admission: 2023    Subjective     Interval History:   Follow-up ESRD.  On dialysis.  .  Blood pressure 123 systolic.  Complains of very sore throat.  Was able to eat lunch.  Review of Systems:   As noted above    Objective     Vitals:   Temp:  [97.7 °F (36.5 °C)-98.4 °F (36.9 °C)] 97.9 °F (36.6 °C)  Heart Rate:  [70-78] 70  Resp:  [16] 16  BP: (124-148)/(55-69) 133/63    Intake/Output Summary (Last 24 hours) at 2023 1544  Last data filed at 2023 1240  Gross per 24 hour   Intake 450 ml   Output --   Net 450 ml       Physical Exam:    General Appearance: Chronically ill.  Alert, on dialysis no acute distress   Skin: warm and dry  HEENT: Pharynx with erythema, no exudate, nonicteric sclera  Neck: supple, no JVD  Lungs clear to auscultation bilaterally  Heart: irreg,irreg   Abdomen: soft, nontender, nondistended.=bs   Extremities: no edema.  Right upper extremity AV fistula.      Scheduled Meds:     apixaban, 2.5 mg, Oral, Q12H  budesonide-formoterol, 2 puff, Inhalation, BID - RT  carvedilol, 6.25 mg, Oral, BID With Meals  cetirizine, 10 mg, Oral, Daily  insulin glargine, 2 Units, Subcutaneous, Nightly  insulin regular, 2-7 Units, Subcutaneous, 4x Daily AC & at Bedtime  levothyroxine, 75 mcg, Oral, Q AM  pantoprazole, 40 mg, Oral, BID AC  rosuvastatin, 40 mg, Oral, Daily  sodium bicarbonate, 650 mg, Oral, TID  sodium chloride, 500 mL, Intravenous, Once  sodium chloride, 10 mL, Intravenous, Q12H  sodium chloride, 10 mL, Intravenous, Q12H      IV Meds:   sodium chloride, 1,000 mL, Last Rate: Stopped (23 1155)        Results Reviewed:   I have personally reviewed the results from the time of this admission to 2023 15:44 EST     Results from last 7 days   Lab Units 23  0318 23  0259 23  0749  11/30/23  0040 11/29/23  0756 11/28/23 2119   SODIUM mmol/L 138 138 137   < > 138 140   POTASSIUM mmol/L 5.0 4.8 4.6   < > 3.2* 4.0   CHLORIDE mmol/L 106 105 106   < > 101 97*   CO2 mmol/L 27.0 27.3 26.3   < > 26.3 26.3   BUN mg/dL 39* 32* 21   < > 36* 28*   CREATININE mg/dL 3.22* 2.96* 2.24*   < > 3.24* 2.74*   CALCIUM mg/dL 8.5* 8.2* 8.4*   < > 8.8 9.5   BILIRUBIN mg/dL  --   --   --   --  0.3 0.4   ALK PHOS U/L  --   --   --   --  66 81   ALT (SGPT) U/L  --   --   --   --  23 31   AST (SGOT) U/L  --   --   --   --  19 27   GLUCOSE mg/dL 87 75 92   < > 133* 243*    < > = values in this interval not displayed.       Estimated Creatinine Clearance: 20.4 mL/min (A) (by C-G formula based on SCr of 3.22 mg/dL (H)).    Results from last 7 days   Lab Units 12/05/23 0318 12/04/23 0259 12/02/23 2034 12/01/23  0903 11/30/23 0040 11/28/23 2119   MAGNESIUM mg/dL  --  1.8  --   --  2.0 2.0   PHOSPHORUS mg/dL 2.9 2.2* 1.1*   < > 3.0  --     < > = values in this interval not displayed.             Results from last 7 days   Lab Units 12/05/23 0318 12/04/23 0259 12/03/23  0749 12/03/23  0002 12/02/23 2034 12/02/23  0320   WBC 10*3/mm3 8.80 9.56 11.80*  --  7.66 6.92   HEMOGLOBIN g/dL 7.8* 8.0* 8.6* 8.1* 8.8* 8.1*   PLATELETS 10*3/mm3 262 249 257  --  263 253       Results from last 7 days   Lab Units 11/28/23  2119   INR  1.18*       Assessment / Plan     ASSESSMENT:  1..  ESRD.  Dialysis today.  Phosphorus replete today.  2. Paroxysmal A-fib with rapid ventricular response.  On Coreg and Eliquis (lower dose).  3.  GI bleed with coffee ground emesis.  Esophagitis on EGD.  Chronic inflammation.  On pantoprazole.  4.  Type II NSTEMI.  5.  Chronic combined systolic and diastolic heart failure.  Remove volume with dialysis.  6.  Anemia of chronic kidney disease.  On HAI as outpatient  7.  Hypothyroidism on replacement.  8.  History of embolic CVA  9. DM2    PLAN:  Hemodialysis today.    Thank you for involving us in the care  of Carlito Mo.  Please feel free to call with any questions.    Zahida Hsu MD  12/05/23  15:44 EST    [facility]  [phone]    Please note that portions of this note were completed with a voice recognition program.

## 2023-12-05 NOTE — PROGRESS NOTES
"    Patient Name: Carlito Mo  :1955  68 y.o.      Patient Care Team:  Florencia Caballero MD as PCP - General (Internal Medicine)  Amber Murguia MD as Consulting Physician (Cardiology)  [facility] as Pharmacist  Seth Ladd MD as Surgeon (General Surgery)  Kim Hoyos MD PhD as Consulting Physician (Hematology and Oncology)  Jerome Diallo MD as Referring Physician (Family Medicine)  Jose Enrique Louie MD as Consulting Physician (Gastroenterology)  Nima Huddleston MD as Consulting Physician (Nephrology)    Chief Complaint: follow up CHF, PAF    Interval History: weight down two pounds. BP and HR have been stable. Waste products up some today. Hgb down slightly.        Objective   Vital Signs  Temp:  [97.5 °F (36.4 °C)-98.4 °F (36.9 °C)] 97.7 °F (36.5 °C)  Heart Rate:  [73-90] 78  Resp:  [16-20] 16  BP: (124-188)/(55-93) 148/56    Intake/Output Summary (Last 24 hours) at 2023 0901  Last data filed at 2023 0800  Gross per 24 hour   Intake 750 ml   Output --   Net 750 ml     Flowsheet Rows      Flowsheet Row First Filed Value   Admission Height 167.6 cm (66\") Documented at 2023 0615   Admission Weight 61.3 kg (135 lb 2.3 oz) Documented at 2023 0340            Physical Exam:   General Appearance:    Alert, cooperative, in no acute distress   Lungs:     Clear to auscultation.  Normal respiratory effort and rate.      Heart:    Regular rhythm and normal rate, normal S1 and S2, no murmurs, gallops or rubs.     Chest Wall:    No abnormalities observed   Abdomen:     Soft, nontender, positive bowel sounds.     Extremities:   no cyanosis, clubbing or edema.  No marked joint deformities.  Adequate musculoskeletal strength.       Results Review:    Results from last 7 days   Lab Units 23  0318   SODIUM mmol/L 138   POTASSIUM mmol/L 5.0   CHLORIDE mmol/L 106   CO2 mmol/L 27.0   BUN mg/dL 39*   CREATININE mg/dL 3.22*   GLUCOSE mg/dL 87   CALCIUM mg/dL 8.5* "     Results from last 7 days   Lab Units 11/30/23  0226 11/30/23  0040 11/29/23  0756   HSTROP T ng/L 170* 173* 155*     Results from last 7 days   Lab Units 12/05/23  0318   WBC 10*3/mm3 8.80   HEMOGLOBIN g/dL 7.8*   HEMATOCRIT % 26.0*   PLATELETS 10*3/mm3 262     Results from last 7 days   Lab Units 11/28/23  2119   INR  1.18*     Results from last 7 days   Lab Units 12/04/23  0259   MAGNESIUM mg/dL 1.8                   Medication Review:   apixaban, 2.5 mg, Oral, Q12H  budesonide-formoterol, 2 puff, Inhalation, BID - RT  carvedilol, 6.25 mg, Oral, BID With Meals  cetirizine, 10 mg, Oral, Daily  insulin glargine, 2 Units, Subcutaneous, Nightly  insulin regular, 2-7 Units, Subcutaneous, 4x Daily AC & at Bedtime  levothyroxine, 75 mcg, Oral, Q AM  pantoprazole, 40 mg, Intravenous, BID  rosuvastatin, 40 mg, Oral, Daily  sodium bicarbonate, 650 mg, Oral, TID  sodium chloride, 500 mL, Intravenous, Once  sodium chloride, 10 mL, Intravenous, Q12H  sodium chloride, 10 mL, Intravenous, Q12H         sodium chloride, 1,000 mL, Last Rate: Stopped (12/04/23 1155)        Assessment & Plan   Recent vomiting and diarrhea, possible viral gastroenteritis  Acute on chronic anemia with coffee ground emesis, no obvious bleeding source by EGD 12/4/2023  Paroxysmal atrial fibrillation  Chronic combined systolic and diastolic CHF, EF 40-45%. Compensated.   Coronary artery disease status post CABG 2001  History of embolic CVA  ESRD on hemodialysis   COPD with acute exacerbation  RBBB, chronic  Elevated HS troponin secondary to renal disease  Diabetes mellitus type II on chronic insulin    Apixaban has been restarted but at lower dose than indicated. If he tolerates this without significant bleeding or worsening anemia, he will eventually need to be on 5mg BID.     HR an BP are stable on current regimen.     Volume status appears stable and managed by HD - scheduled for HD today.     Noemy Rea, APRNEREYDA  [facility]  12/05/23  09:01 EST

## 2023-12-05 NOTE — PROGRESS NOTES
Name: Carlito Mo ADMIT: 2023   : 1955  PCP: Florencia Caballero MD    MRN: [id number] LOS: 6 days   AGE/SEX: 68 y.o. male  ROOM: [facility]     Subjective   Subjective   Sitting up in the bed, eating breakfast.  Status post EGD yesterday which showed LA grade B esophagitis.  Denies any further episodes of vomiting.  Denies abdominal pain.  Tolerating diet.    Review of Systems   As above  Objective   Objective   Vital Signs  Temp:  [97.7 °F (36.5 °C)-98.4 °F (36.9 °C)] 97.9 °F (36.6 °C)  Heart Rate:  [70-78] 70  Resp:  [16] 16  BP: (124-148)/(55-69) 133/63  SpO2:  [94 %-98 %] 98 %  on   ;   Device (Oxygen Therapy): room air  Body mass index is 23.39 kg/m².  Physical Exam    General: Sitting up in the bed, eating breakfast, chronically ill-appearing  HEENT: Normocephalic, atraumatic  CV: Regular rate and rhythm, no murmurs rubs or gallops  Lungs: Clear to auscultation bilaterally, no crackles or wheezes  Abdomen: Soft, nontender, nondistended  Extremities: No significant peripheral edema , no cyanosis     Results Review     I reviewed the patient's new clinical results.  Results from last 7 days   Lab Units 2349 23  0002 23   WBC 10*3/mm3 8.80 9.56 11.80*  --  7.66   HEMOGLOBIN g/dL 7.8* 8.0* 8.6* 8.1* 8.8*   PLATELETS 10*3/mm3 262 249 257  --  263     Results from last 7 days   Lab Units 23  0749 23   SODIUM mmol/L 138 138 137 137   POTASSIUM mmol/L 5.0 4.8 4.6 4.3   CHLORIDE mmol/L 106 105 106 103   CO2 mmol/L 27.0 27.3 26.3 29.0   BUN mg/dL 39* 32* 21 14   CREATININE mg/dL 3.22* 2.96* 2.24* 1.68*   GLUCOSE mg/dL 87 75 92 174*   Estimated Creatinine Clearance: 20.4 mL/min (A) (by C-G formula based on SCr of 3.22 mg/dL (H)).  Results from last 7 days   Lab Units 23  0318 23  0259 23  2034 23  0320 23  0040 23  0756 23  2119   ALBUMIN g/dL 2.8* 2.8* 3.3*  2.8*   < > 3.2* 3.9   BILIRUBIN mg/dL  --   --   --   --   --  0.3 0.4   ALK PHOS U/L  --   --   --   --   --  66 81   AST (SGOT) U/L  --   --   --   --   --  19 27   ALT (SGPT) U/L  --   --   --   --   --  23 31    < > = values in this interval not displayed.     Results from last 7 days   Lab Units 12/05/23  0318 12/04/23  0259 12/03/23  0749 12/02/23  2034 12/02/23  0320 12/01/23  0903 11/30/23  0040 11/29/23  0756 11/28/23  2119   CALCIUM mg/dL 8.5* 8.2* 8.4* 8.3* 8.3*   < > 8.6   < > 9.5   ALBUMIN g/dL 2.8* 2.8*  --  3.3* 2.8*   < > 3.0*   < > 3.9   MAGNESIUM mg/dL  --  1.8  --   --   --   --  2.0  --  2.0   PHOSPHORUS mg/dL 2.9 2.2*  --  1.1* 2.5   < > 3.0  --   --     < > = values in this interval not displayed.     Results from last 7 days   Lab Units 11/29/23  0026 11/28/23  2120   LACTATE mmol/L 1.9 3.5*     COVID19   Date Value Ref Range Status   05/02/2023 Not Detected Not Detected - Ref. Range Final   03/07/2023 Not Detected Not Detected - Ref. Range Final     Glucose   Date/Time Value Ref Range Status   12/05/2023 1124 136 (H) 70 - 130 mg/dL Final   12/05/2023 0646 77 70 - 130 mg/dL Final   12/04/2023 2142 200 (H) 70 - 130 mg/dL Final   12/04/2023 1641 77 70 - 130 mg/dL Final   12/04/2023 1217 83 70 - 130 mg/dL Final   12/04/2023 0625 88 70 - 130 mg/dL Final   12/03/2023 2110 126 70 - 130 mg/dL Final           XR Chest 1 View  Narrative: SINGLE VIEW OF THE CHEST     HISTORY: Shortness of breath     COMPARISON: November 2030 is 23     FINDINGS:  The patient status post median sternotomy with coronary artery bypass  grafting. There is cardiomegaly. There is calcification of the aorta. No  pneumothorax or pleural effusion is seen. No definite acute infiltrates  are identified.     Impression: No acute findings.     This report was finalized on 11/28/2023 9:51 PM by Dr. Alice Allen M.D on Workstation: [id number]       Scheduled Medications  apixaban, 2.5 mg, Oral, Q12H  budesonide-formoterol, 2  puff, Inhalation, BID - RT  carvedilol, 6.25 mg, Oral, BID With Meals  cetirizine, 10 mg, Oral, Daily  insulin glargine, 2 Units, Subcutaneous, Nightly  insulin regular, 2-7 Units, Subcutaneous, 4x Daily AC & at Bedtime  levothyroxine, 75 mcg, Oral, Q AM  pantoprazole, 40 mg, Intravenous, BID  rosuvastatin, 40 mg, Oral, Daily  sodium bicarbonate, 650 mg, Oral, TID  sodium chloride, 500 mL, Intravenous, Once  sodium chloride, 10 mL, Intravenous, Q12H  sodium chloride, 10 mL, Intravenous, Q12H    Infusions  sodium chloride, 1,000 mL, Last Rate: Stopped (12/04/23 1155)    Diet  Diet: Diabetic Diets; Consistent Carbohydrate; Texture: Regular Texture (IDDSI 7); Fluid Consistency: Thin (IDDSI 0)    I have personally reviewed     []  Laboratory   []  Microbiology   []  Radiology   []  EKG/Telemetry  []  Cardiology/Vascular   []  Pathology    []  Records       Assessment/Plan     Active Hospital Problems    Diagnosis  POA   • **Atrial fibrillation with rapid ventricular response [I48.91]  Yes   • ESRD (end stage renal disease) [N18.6]  Yes   • Nausea & vomiting [R11.2]  Yes   • Hemoptysis [R04.2]  Yes   • Coffee ground emesis [K92.0]  Unknown   • Embolic stroke [I63.9]  Yes   • Coronary artery disease involving native coronary artery of native heart without angina pectoris [I25.10]  Yes   • Chronic combined systolic and diastolic CHF (congestive heart failure) [I50.42]  Yes   • Acquired hypothyroidism [E03.9]  Yes   • Benign prostatic hyperplasia with nocturia [N40.1, R35.1]  Yes   • Type 2 diabetes mellitus, with long-term current use of insulin [E11.9, Z79.4]  Not Applicable      Resolved Hospital Problems   No resolved problems to display.       68 y.o. male admitted with Atrial fibrillation with rapid ventricular response.    GI bleeding/coffee ground emesis/diarrhea-  -possible viral gastroenteritis  GI evaluated, status post EGD 12/04/2023 which showed hiatal hernia and LA grade B esophagitis.  Biopsies consistent  with chronic inflammation without evidence of intestinal metaplasia or H. pylori   -On IV PPI, transition to p.o. pantoprazole 40 mg twice daily  Hemoglobin 7.8 today.  Monitor.  -Eliquis resumed at lower dose 2.5 mg twice daily 12/04.  If remains stable with no further signs of bleeding or worsening anemia, plan to increase Eliquis to 5 mg twice daily, timing to be determined.           Afib with rvr-s/p diltiazem drip. On low dose coreg, Eliquis as above.  Cardiology following.    Elevated troponin due to ESRD and type 2 NSTEMI from rvr-no plans for ischemic workup at this time.  Metabolic encephalopathy-resolved    Coronary artery disease s/p CABG in 2001-statin/bb. Asa on hold    Chronic combined systolic and diastolic heart failure-bb. Torsemide is held. euvolemic    ESRD on HD T, TH, Sa-nephrology is managing dialysis    Anemia of chronic disease-on HAI as an outpatient  Type 2 diabetes-A1c 6.9. He is on a very low dose of glargine as an outpatient. Continue sliding scale.  COPD with chronic respiratory failure with hypoxia on home O2-symbicort    Hypothyroidism-synthroid    History of embolic CVA-eliquis on hold as above    Nasal congestion-zyrtec and saline nasal spray    DVT prophylax: Eliquis  Full code.  Discussed with patient.  Anticipate discharge to SNU, likely medically stable to be discharged tomorrow if hemoglobin remains stable and cleared by consultants.      Copied text in this note has been reviewed and is accurate as of 12/05/23.         Dictated utilizing Dragon dictation        Aria Red MD  [facility]  12/05/23  14:18 EST

## 2023-12-05 NOTE — PLAN OF CARE
Goal Outcome Evaluation:  Plan of Care Reviewed With: patient        Progress: improving  Outcome Evaluation: vss, ra, afib, a/o x4. Currently receiving HD.

## 2023-12-06 LAB
ALBUMIN SERPL-MCNC: 2.5 G/DL (ref 3.5–5.2)
ANION GAP SERPL CALCULATED.3IONS-SCNC: 7.1 MMOL/L (ref 5–15)
BUN SERPL-MCNC: 20 MG/DL (ref 8–23)
BUN/CREAT SERPL: 9 (ref 7–25)
CALCIUM SPEC-SCNC: 8.1 MG/DL (ref 8.6–10.5)
CHLORIDE SERPL-SCNC: 105 MMOL/L (ref 98–107)
CO2 SERPL-SCNC: 25.9 MMOL/L (ref 22–29)
CREAT SERPL-MCNC: 2.22 MG/DL (ref 0.76–1.27)
DEPRECATED RDW RBC AUTO: 40.7 FL (ref 37–54)
EGFRCR SERPLBLD CKD-EPI 2021: 31.5 ML/MIN/1.73
ERYTHROCYTE [DISTWIDTH] IN BLOOD BY AUTOMATED COUNT: 13.3 % (ref 12.3–15.4)
GLUCOSE BLDC GLUCOMTR-MCNC: 102 MG/DL (ref 70–130)
GLUCOSE BLDC GLUCOMTR-MCNC: 110 MG/DL (ref 70–130)
GLUCOSE BLDC GLUCOMTR-MCNC: 165 MG/DL (ref 70–130)
GLUCOSE BLDC GLUCOMTR-MCNC: 188 MG/DL (ref 70–130)
GLUCOSE SERPL-MCNC: 116 MG/DL (ref 65–99)
HCT VFR BLD AUTO: 25.7 % (ref 37.5–51)
HGB BLD-MCNC: 7.8 G/DL (ref 13–17.7)
MCH RBC QN AUTO: 25.9 PG (ref 26.6–33)
MCHC RBC AUTO-ENTMCNC: 30.4 G/DL (ref 31.5–35.7)
MCV RBC AUTO: 85.4 FL (ref 79–97)
PHOSPHATE SERPL-MCNC: 2.9 MG/DL (ref 2.5–4.5)
PLATELET # BLD AUTO: 257 10*3/MM3 (ref 140–450)
PMV BLD AUTO: 9.8 FL (ref 6–12)
POTASSIUM SERPL-SCNC: 4.5 MMOL/L (ref 3.5–5.2)
RBC # BLD AUTO: 3.01 10*6/MM3 (ref 4.14–5.8)
SODIUM SERPL-SCNC: 138 MMOL/L (ref 136–145)
WBC NRBC COR # BLD AUTO: 5.78 10*3/MM3 (ref 3.4–10.8)

## 2023-12-06 PROCEDURE — 85027 COMPLETE CBC AUTOMATED: CPT | Performed by: INTERNAL MEDICINE

## 2023-12-06 PROCEDURE — 99232 SBSQ HOSP IP/OBS MODERATE 35: CPT | Performed by: INTERNAL MEDICINE

## 2023-12-06 PROCEDURE — 94799 UNLISTED PULMONARY SVC/PX: CPT

## 2023-12-06 PROCEDURE — 82948 REAGENT STRIP/BLOOD GLUCOSE: CPT

## 2023-12-06 PROCEDURE — 63710000001 INSULIN REGULAR HUMAN PER 5 UNITS: Performed by: INTERNAL MEDICINE

## 2023-12-06 PROCEDURE — 99232 SBSQ HOSP IP/OBS MODERATE 35: CPT | Performed by: NURSE PRACTITIONER

## 2023-12-06 PROCEDURE — 63710000001 INSULIN GLARGINE PER 5 UNITS: Performed by: INTERNAL MEDICINE

## 2023-12-06 PROCEDURE — 80069 RENAL FUNCTION PANEL: CPT | Performed by: INTERNAL MEDICINE

## 2023-12-06 PROCEDURE — 97116 GAIT TRAINING THERAPY: CPT

## 2023-12-06 RX ADMIN — ROSUVASTATIN CALCIUM 40 MG: 40 TABLET, FILM COATED ORAL at 09:08

## 2023-12-06 RX ADMIN — SODIUM BICARBONATE 650 MG: 650 TABLET, ORALLY DISINTEGRATING ORAL at 21:22

## 2023-12-06 RX ADMIN — Medication 10 ML: at 21:22

## 2023-12-06 RX ADMIN — SODIUM BICARBONATE 650 MG: 650 TABLET, ORALLY DISINTEGRATING ORAL at 09:08

## 2023-12-06 RX ADMIN — Medication 10 ML: at 09:08

## 2023-12-06 RX ADMIN — INSULIN GLARGINE 2 UNITS: 100 INJECTION, SOLUTION SUBCUTANEOUS at 21:23

## 2023-12-06 RX ADMIN — PANTOPRAZOLE SODIUM 40 MG: 40 TABLET, DELAYED RELEASE ORAL at 18:13

## 2023-12-06 RX ADMIN — SODIUM BICARBONATE 650 MG: 650 TABLET, ORALLY DISINTEGRATING ORAL at 16:45

## 2023-12-06 RX ADMIN — APIXABAN 2.5 MG: 2.5 TABLET, FILM COATED ORAL at 21:22

## 2023-12-06 RX ADMIN — LEVOTHYROXINE SODIUM 75 MCG: 75 TABLET ORAL at 06:36

## 2023-12-06 RX ADMIN — PANTOPRAZOLE SODIUM 40 MG: 40 TABLET, DELAYED RELEASE ORAL at 06:36

## 2023-12-06 RX ADMIN — CETIRIZINE HYDROCHLORIDE 10 MG: 10 TABLET ORAL at 09:08

## 2023-12-06 RX ADMIN — APIXABAN 2.5 MG: 2.5 TABLET, FILM COATED ORAL at 09:08

## 2023-12-06 RX ADMIN — CARVEDILOL 6.25 MG: 6.25 TABLET, FILM COATED ORAL at 18:13

## 2023-12-06 RX ADMIN — INSULIN HUMAN 2 UNITS: 100 INJECTION, SOLUTION PARENTERAL at 12:04

## 2023-12-06 RX ADMIN — BUDESONIDE AND FORMOTEROL FUMARATE DIHYDRATE 2 PUFF: 160; 4.5 AEROSOL RESPIRATORY (INHALATION) at 19:23

## 2023-12-06 RX ADMIN — CARVEDILOL 6.25 MG: 6.25 TABLET, FILM COATED ORAL at 09:08

## 2023-12-06 RX ADMIN — BUDESONIDE AND FORMOTEROL FUMARATE DIHYDRATE 2 PUFF: 160; 4.5 AEROSOL RESPIRATORY (INHALATION) at 07:14

## 2023-12-06 RX ADMIN — INSULIN HUMAN 2 UNITS: 100 INJECTION, SOLUTION PARENTERAL at 18:13

## 2023-12-06 NOTE — PROGRESS NOTES
[facility] Progress Note      Patient Name: Carlito Mo  : 1955  MRN: [id number]  Primary Care Physician:  Florencia Caballero MD  Date of admission: 2023    Subjective     Interval History:   Follow-up ESRD.  Throat sore but a little better.  Very tired.  Did not sleep well last night due to interruptions.  Was able to eat breakfast.  Review of Systems:   As noted above    Objective     Vitals:   Temp:  [97.1 °F (36.2 °C)-98.4 °F (36.9 °C)] 97.5 °F (36.4 °C)  Heart Rate:  [59-76] 59  Resp:  [16] 16  BP: (131-157)/(63-77) 137/67    Intake/Output Summary (Last 24 hours) at 2023 1117  Last data filed at 2023 0910  Gross per 24 hour   Intake 320 ml   Output 2540 ml   Net -2220 ml       Physical Exam:    General Appearance: Chronically ill.  Leaping soundly but does awaken and answer questions appropriately.  Skin: warm and dry  HEENT: Oral mucosa dry.  Nonicteric sclera  Neck: supple, no JVD  Lungs clear to auscultation bilaterally  Heart: irreg,irreg   Abdomen: soft, nontender, nondistended.+bs   Extremities: no edema.  Right upper extremity AV fistula.      Scheduled Meds:     apixaban, 2.5 mg, Oral, Q12H  budesonide-formoterol, 2 puff, Inhalation, BID - RT  carvedilol, 6.25 mg, Oral, BID With Meals  cetirizine, 10 mg, Oral, Daily  insulin glargine, 2 Units, Subcutaneous, Nightly  insulin regular, 2-7 Units, Subcutaneous, 4x Daily AC & at Bedtime  levothyroxine, 75 mcg, Oral, Q AM  pantoprazole, 40 mg, Oral, BID AC  rosuvastatin, 40 mg, Oral, Daily  sodium bicarbonate, 650 mg, Oral, TID  sodium chloride, 500 mL, Intravenous, Once  sodium chloride, 10 mL, Intravenous, Q12H  sodium chloride, 10 mL, Intravenous, Q12H      IV Meds:          Results Reviewed:   I have personally reviewed the results from the time of this admission to 2023 11:17 EST     Results from last 7 days   Lab Units 23  0349 23  0318 23  0259   SODIUM mmol/L 138 138 138    POTASSIUM mmol/L 4.5 5.0 4.8   CHLORIDE mmol/L 105 106 105   CO2 mmol/L 25.9 27.0 27.3   BUN mg/dL 20 39* 32*   CREATININE mg/dL 2.22* 3.22* 2.96*   CALCIUM mg/dL 8.1* 8.5* 8.2*   GLUCOSE mg/dL 116* 87 75       Estimated Creatinine Clearance: 30.8 mL/min (A) (by C-G formula based on SCr of 2.22 mg/dL (H)).    Results from last 7 days   Lab Units 12/06/23 0349 12/05/23 0318 12/04/23 0259 12/01/23  0903 11/30/23  0040   MAGNESIUM mg/dL  --   --  1.8  --  2.0   PHOSPHORUS mg/dL 2.9 2.9 2.2*   < > 3.0    < > = values in this interval not displayed.             Results from last 7 days   Lab Units 12/06/23 0349 12/05/23 0318 12/04/23 0259 12/03/23  0749 12/03/23  0002 12/02/23  2034   WBC 10*3/mm3 5.78 8.80 9.56 11.80*  --  7.66   HEMOGLOBIN g/dL 7.8* 7.8* 8.0* 8.6* 8.1* 8.8*   PLATELETS 10*3/mm3 257 262 249 257  --  263               Assessment / Plan     ASSESSMENT:  1..  ESRD.  Dialysis tomorrow.   Phosphorus replete today.  2. Paroxysmal A-fib with rapid ventricular response.  On Coreg and Eliquis (lower dose).  3.  GI bleed with coffee ground emesis.  Esophagitis on EGD.  Chronic inflammation.  On pantoprazole.  4.  Type II NSTEMI.  5.  Chronic combined systolic and diastolic heart failure.  Remove volume with dialysis.  6.  Anemia of chronic kidney disease.  Hemoglobin low but unchanged.  On HAI as outpatient  7.  Hypothyroidism on replacement.  8.  History of embolic CVA  9. DM2    PLAN:  Hemodialysis tomorrow.     Thank you for involving us in the care of Carlito Mo.  Please feel free to call with any questions.    Zahida Hsu MD  12/06/23  11:17 [facility]    [facility]  [phone]    Please note that portions of this note were completed with a voice recognition program.

## 2023-12-06 NOTE — THERAPY TREATMENT NOTE
Patient Name: Carlito Mo  : 1955    MRN: [id number]                              Today's Date: 2023       Admit Date: 2023    Visit Dx:     ICD-10-CM ICD-9-CM   1. Atrial fibrillation with rapid ventricular response  I48.91 427.31   2. Nausea and vomiting, unspecified vomiting type  R11.2 787.01   3. End stage renal disease on dialysis  N18.6 585.6    Z99.2 V45.11   4. Coagulopathy: Eliquis induced  D68.9 286.9   5. Upper GI bleed  K92.2 578.9   6. Coffee ground emesis  K92.0 578.0     Patient Active Problem List   Diagnosis    Major depressive disorder with single episode, in partial remission    Other hyperlipidemia    Type 2 diabetes mellitus, with long-term current use of insulin    Vitamin D deficiency    Erectile dysfunction    Chronic fatigue    Type 2 diabetes mellitus with ophthalmic complication    Skin lesion of chest wall    Slow transit constipation    Benign prostatic hyperplasia with nocturia    History of basal cell carcinoma    High blood pressure associated with diabetes    Acquired hypothyroidism    Hypertensive urgency    Recurrent strokes    Noncompliance w/medication treatment due to intermit use of medication    Urinary retention    Pneumonia    Chronic combined systolic and diastolic CHF (congestive heart failure)    Acute respiratory failure with hypoxia    Suspected sleep apnea    Pleural effusion    YAW (obstructive sleep apnea)    Coronary artery disease involving native coronary artery of native heart without angina pectoris    Chronically elevated troponin    Colon cancer screening    Enlarged lymph nodes    Functional gait abnormality    History of myocardial infarction    Hospital discharge follow-up    Insomnia    Iron deficiency anemia    Major depression, recurrent    Anemia, chronic renal failure, stage 3 (moderate)    Essential hypertension    Adverse effect of iron and its compounds, initial encounter    Acute on chronic renal insufficiency    Debility     Falls frequently    Acute pain of right shoulder    Acute on chronic anemia    Heme positive stool    Neurogenic orthostatic hypotension    Anemia, chronic disease    History of colon polyps    Helicobacter pylori gastritis    Esophagitis    Sleep related hypoxia    Embolic stroke    Patent foramen ovale    Pleural effusion, bilateral    Cardiomyopathy    Lower abdominal pain    Diarrhea    CKD (chronic kidney disease) stage 4, GFR 15-29 ml/min    Hypertension not at goal    Memory loss due to medical condition    Generalized weakness    ERRONEOUS ENCOUNTER--DISREGARD    Weakness    Paroxysmal atrial fibrillation    Chronic anticoagulation    Multiple falls    Dehydration    SYLVAIN (acute kidney injury)    Acute ischemic stroke    Acute combined systolic and diastolic congestive heart failure    History of stroke    Iron deficiency anemia    Acute HFrEF (heart failure with reduced ejection fraction)    Atrial fibrillation with rapid ventricular response    ESRD (end stage renal disease)    Nausea & vomiting    Hemoptysis    Coffee ground emesis     Past Medical History:   Diagnosis Date    Acquired hypothyroidism     Acute congestive heart failure     Acute respiratory failure with hypoxia     Acute sinusitis     SYLVAIN (acute kidney injury)     on CKD    Anemia     Arthritis     CAD (coronary artery disease)     Cancer     skin    Chronic combined systolic and diastolic congestive heart failure     Chronic ischemic heart disease     Chronic kidney disease (CKD)     CKD (chronic kidney disease)     COPD (chronic obstructive pulmonary disease)     Depression     Diabetes mellitus type 2, uncontrolled, without complications     Diabetic neuropathy     Disease of thyroid gland     Elevated cholesterol     Fatigue     Frequent PVCs     Generalized weakness     GERD (gastroesophageal reflux disease)     Heart attack     History of coronary artery disease     with remote history of bypass surgery in 2001    Hyperlipidemia      Hypertension     Hypertensive encephalopathy     Mental status change     Acute    Nausea & vomiting 12/01/2023    NO DEVICE/RECALLED     Pleural effusion     Pneumonia     Poor historian     RBBB (right bundle branch block)     Stroke     POOR VISION    TIA (transient ischemic attack)     Type 2 diabetes mellitus     Urinary retention     Vitamin D deficiency      Past Surgical History:   Procedure Laterality Date    APPENDECTOMY N/A 02/14/2016    Dr. Alexey Dodson    ARTERIOVENOUS FISTULA/SHUNT SURGERY Right 06/03/2022    Procedure: RIGHT FOREARM ARTERIOVENOUS FISTULA PLACEMENT RADIOCEPHALIC WITH CEPHALIC VEIN TRANSPOSITION;  Surgeon: Eliseo Willis MD;  Location: [facility] MAIN OR;  Service: Vascular;  Laterality: Right;    COLONOSCOPY      over 20 years ago.  no polyps     COLONOSCOPY N/A 09/18/2018    Procedure: COLONOSCOPY;  Surgeon: Jose Enrique Louie MD;  Location: [facility] ENDOSCOPY;  Service: Gastroenterology    COLONOSCOPY N/A 09/19/2018    IH, EH, polyps (TAs w/low grade dysplasia)    COLONOSCOPY N/A 06/01/2020    Procedure: COLONOSCOPY;  Surgeon: Jose Enrique Louie MD;  Location: [facility] ENDOSCOPY;  Service: Gastroenterology;  Laterality: N/A;  Pre op-Anemia, Melena, History of Polyps  Post op-To Cecum/TI, Polyp, Poor Prep    CORONARY ARTERY BYPASS GRAFT  11/2001    ENDOSCOPY N/A 05/29/2020    Procedure: ESOPHAGOGASTRODUODENOSCOPY with biopsies;  Surgeon: Amanda Lovelace MD;  Location: [facility] ENDOSCOPY;  Service: Gastroenterology;  Laterality: N/A;  pre-anemia, dark stools  post-esophagitis, hiatal hernia    ENDOSCOPY N/A 09/15/2020    Procedure: ESOPHAGOGASTRODUODENOSCOPY WITH BIOPSIES;  Surgeon: Jose Enrique Louie MD;  Location: [facility] ENDOSCOPY;  Service: Gastroenterology;  Laterality: N/A;  pre: history of melena and esophagitis  post: mild esophagitis and gastritis, small hiatal hernia    ENDOSCOPY N/A 12/4/2023    Procedure: ESOPHAGOGASTRODUODENOSCOPY with bx;  Surgeon: Quoc Elmore  MD JANINE;  Location: [facility] ENDOSCOPY;  Service: Gastroenterology;  Laterality: N/A;  pre: Coffee-ground emesis  post: hiatal hernia, esophagitis    HERNIA REPAIR Left 12/05/2019    THORACENTESIS      TONSILLECTOMY      VASECTOMY        General Information       Row Name 12/06/23 1458          Physical Therapy Time and Intention    Document Type therapy note (daily note)  -     Mode of Treatment individual therapy;physical therapy  -       Row Name 12/06/23 1455          General Information    Patient Profile Reviewed yes  -     Existing Precautions/Restrictions fall  -       Row Name 12/06/23 1458          Cognition    Orientation Status (Cognition) oriented x 3  -       Row Name 12/06/23 1458          Safety Issues, Functional Mobility    Impairments Affecting Function (Mobility) balance;endurance/activity tolerance;strength;postural/trunk control  -               User Key  (r) = Recorded By, (t) = Taken By, (c) = Cosigned By      Initials Name Provider Type    EB Stefany Early PTA Physical Therapist Assistant                   Mobility       Row Name 12/06/23 1450          Bed Mobility    Supine-Sit [place] (Bed Mobility) minimum assist (75% patient effort);verbal cues;nonverbal cues (demo/gesture)  -     Sit-Supine [place] (Bed Mobility) not tested  -     Assistive Device (Bed Mobility) bed rails;head of bed elevated  -EB     Comment, (Bed Mobility) increased time to complete, sequencing cues needed. Pt with very forward flexed posture while sitting EOB.  -       Row Name 12/06/23 1457          Sit-Stand Transfer    Sit-Stand [place] (Transfers) minimum assist (75% patient effort);2 person assist;moderate assist (50% patient effort);1 person to manage equipment  -     Assistive Device (Sit-Stand Transfers) walker, front-wheeled  -EB     Comment, (Sit-Stand Transfer) cues for correct hand placement  -       Row Name 12/06/23 1451          Gait/Stairs (Locomotion)    [place]  Level (Gait) minimum assist (75% patient effort);1 person to manage equipment  -EB     Assistive Device (Gait) walker, front-wheeled  -EB     Distance in Feet (Gait) 30ft  -EB     Deviations/Abnormal Patterns (Gait) eli decreased;gait speed decreased;stride length decreased  -EB     Bilateral Gait Deviations forward flexed posture;heel strike decreased  -EB     Comment, (Gait/Stairs) cues for posture correction, a little unsteady but no LOB.  -EB               User Key  (r) = Recorded By, (t) = Taken By, (c) = Cosigned By      Initials Name Provider Type    Stefany Jacobsen PTA Physical Therapist Assistant                   Obj/Interventions    No documentation.                  Goals/Plan    No documentation.                  Clinical Impression       Row Name 12/06/23 1502          Plan of Care Review    Plan of Care Reviewed With patient  -EB     Progress no change  -EB     Outcome Evaluation Pt seen for PT treatment today. Pt agreeable to participate. Pt is [place]/ModA with supine to sit with pt needing increased time and sequencing cues. Pt with kyphotic posture while sitting EOB. Pt required MinAX2/ModA with STS transfers. Pt was able to ambulate 30ft with rwx, [place]. Pt is a little unsteady but no LOB. Cues for posture correction. Fatigues quickly.  Will continue to progress pt as able.  -EB       Row Name 12/06/23 1502          Therapy Assessment/Plan (PT)    Therapy Frequency (PT) 5 times/wk  -EB       Row Name 12/06/23 1502          Positioning and Restraints    Pre-Treatment Position in bed  -EB     Post Treatment Position chair  -EB     In Chair reclined;call light within reach;encouraged to call for assist;exit alarm on  -EB               User Key  (r) = Recorded By, (t) = Taken By, (c) = Cosigned By      Initials Name Provider Type    Stefany Jacobsen PTA Physical Therapist Assistant                   Outcome Measures       Row Name 12/06/23 1507 12/06/23 0904       How much help from another person  do you currently need...    Turning from your back to your side while in flat bed without using bedrails? 3  -EB 3  -JL    Moving from lying on back to sitting on the side of a flat bed without bedrails? 2  -EB 3  -JL    Moving to and from a bed to a chair (including a wheelchair)? 3  -EB 3  -JL    Standing up from a chair using your arms (e.g., wheelchair, bedside chair)? 2  -EB 3  -JL    Climbing 3-5 steps with a railing? 2  -EB 3  -JL    To walk in hospital room? 3  -EB 3  -JL    AM-PAC 6 Clicks Score (PT) 15  -EB 18  -JL    Highest Level of Mobility Goal 4 --> Transfer to chair/commode  -EB 6 --> Walk 10 steps or more  -JL              User Key  (r) = Recorded By, (t) = Taken By, (c) = Cosigned By      Initials Name Provider Type    JL Yovani Moran, RN Registered Nurse    Stefany Jacobsen PTA Physical Therapist Assistant                                 Physical Therapy Education       Title: PT OT SLP Therapies (In Progress)       Topic: Physical Therapy (In Progress)       Point: Mobility training (Done)       Learning Progress Summary             Patient Acceptance, E,D, VU,NR by  at 12/6/2023 1508    Acceptance, E,TB, VU,DU by  at 12/4/2023 1550    Acceptance, E,TB, VU,NR by  at 12/1/2023 1525                         Point: Home exercise program (Not Started)       Learner Progress:  Not documented in this visit.              Point: Body mechanics (Done)       Learning Progress Summary             Patient Acceptance, E,D, VU,NR by  at 12/6/2023 1508    Acceptance, E,TB, VU,DU by  at 12/4/2023 1550    Acceptance, E,TB, VU,NR by  at 12/1/2023 1525                         Point: Precautions (Done)       Learning Progress Summary             Patient Acceptance, E,D, VU,NR by  at 12/6/2023 1508    Acceptance, E,TB, VU,DU by  at 12/4/2023 1550                                         User Key       Initials Effective Dates Name Provider Type Discipline     06/16/21 -  Katrin Garcia, PT Physical  Therapist PT    EB 02/14/23 -  Stefany Early PTA Physical Therapist Assistant PT    CS 09/22/22 -  Tiffany Sheth PT Physical Therapist PT                  PT Recommendation and Plan     Plan of Care Reviewed With: patient  Progress: no change  Outcome Evaluation: Pt seen for PT treatment today. Pt agreeable to participate. Pt is [place]/ModA with supine to sit with pt needing increased time and sequencing cues. Pt with kyphotic posture while sitting EOB. Pt required MinAX2/ModA with STS transfers. Pt was able to ambulate 30ft with rwx, [place]. Pt is a little unsteady but no LOB. Cues for posture correction. Fatigues quickly.  Will continue to progress pt as able.     Time Calculation:         PT Charges       Row Name 12/06/23 1453             Time Calculation    Start Time 1344  -EB      Stop Time 1357  -EB      Time Calculation (min) 13 min  -EB      PT Received On 12/06/23  -EB      PT - Next Appointment 12/07/23  -EB         Time Calculation- PT    Total Timed Code Minutes- PT 13 minute(s)  -EB                User Key  (r) = Recorded By, (t) = Taken By, (c) = Cosigned By      Initials Name Provider Type    EB Stefany Early PTA Physical Therapist Assistant                  Therapy Charges for Today       Code Description Service Date Service Provider Modifiers Qty    52074577404 HC GAIT TRAINING EA 15 MIN 12/6/2023 Stefany Early PTA GP 1    69378372745 HC PT THER SUPP EA 15 MIN 12/6/2023 Stefany Early PTA GP 1            PT G-Codes  Outcome Measure Options: AM-PAC 6 Clicks Basic Mobility (PT)  AM-PAC 6 Clicks Score (PT): 15       Stefany Early PTA  12/6/2023

## 2023-12-06 NOTE — PROGRESS NOTES
[facility]  Inpatient Progress Note    Reason for Followup:  Coffee ground emesis    Subjective     Interval History:   No new issues    Current Facility-Administered Medications:     acetaminophen (TYLENOL) tablet 650 mg, 650 mg, Oral, Q4H PRN **OR** acetaminophen (TYLENOL) 160 MG/5ML oral solution 650 mg, 650 mg, Oral, Q4H PRN **OR** acetaminophen (TYLENOL) suppository 650 mg, 650 mg, Rectal, Q4H PRN, Quoc Elmore MD    acetaminophen (TYLENOL) tablet 650 mg, 650 mg, Oral, Q4H PRN **OR** acetaminophen (TYLENOL) suppository 650 mg, 650 mg, Rectal, Q4H PRN, Quoc Elmore MD    apixaban (ELIQUIS) tablet 2.5 mg, 2.5 mg, Oral, Q12H, Asim Hogue MD, 2.5 mg at 12/05/23 2057    budesonide-formoterol (SYMBICORT) 160-4.5 MCG/ACT inhaler 2 puff, 2 puff, Inhalation, BID - RT, Quoc Elmore MD, 2 puff at 12/06/23 0714    calcium carbonate (TUMS) chewable tablet 500 mg (200 mg elemental), 2 tablet, Oral, BID PRN, Quoc Elmore MD    carvedilol (COREG) tablet 6.25 mg, 6.25 mg, Oral, BID With Meals, Quoc Elmore MD, 6.25 mg at 12/05/23 2057    cetirizine (zyrTEC) tablet 10 mg, 10 mg, Oral, Daily, Quoc Elmore MD, 10 mg at 12/05/23 0837    dextrose (D50W) (25 g/50 mL) IV injection 25 g, 25 g, Intravenous, Q15 Min PRN, Quoc Elmore MD    dextrose (D50W) (25 g/50 mL) IV injection 25 g, 25 g, Intravenous, Q15 Min PRN, Quoc Elmore MD    dextrose (GLUTOSE) oral gel 15 g, 15 g, Oral, Q15 Min PRN, Quoc Elmore MD    glucagon (GLUCAGEN) injection 1 mg, 1 mg, Intramuscular, Q15 Min PRN, Quoc Elmore MD    insulin glargine (LANTUS, SEMGLEE) injection 2 Units, 2 Units, Subcutaneous, Nightly, Quoc Elmore MD, 2 Units at 12/05/23 2057    insulin regular (humuLIN R,novoLIN R) injection 2-7 Units, 2-7 Units, Subcutaneous, 4x Daily AC & at Bedtime, Quoc Elmore MD, 2 Units at 12/05/23 2058    ipratropium-albuterol  (DUO-NEB) nebulizer solution 3 mL, 3 mL, Nebulization, Q6H PRN, Quoc Elmore MD    levothyroxine (SYNTHROID, LEVOTHROID) tablet 75 mcg, 75 mcg, Oral, Q AM, Quoc Emlore MD, 75 mcg at 12/06/23 0636    nitroglycerin (NITROSTAT) SL tablet 0.4 mg, 0.4 mg, Sublingual, Q5 Min PRN, Quoc Elmore MD    ondansetron (ZOFRAN) tablet 4 mg, 4 mg, Oral, Q6H PRN **OR** ondansetron (ZOFRAN) injection 4 mg, 4 mg, Intravenous, Q6H PRN, Quoc Elmore MD    pantoprazole (PROTONIX) EC tablet 40 mg, 40 mg, Oral, BID AC, Aria Red MD, 40 mg at 12/06/23 0636    phenol (CHLORASEPTIC) 1.4 % liquid 1 spray, 1 spray, Mouth/Throat, Q2H PRN, Quoc Elmore MD, 1 spray at 12/05/23 0841    rosuvastatin (CRESTOR) tablet 40 mg, 40 mg, Oral, Daily, Quoc Elmore MD, 40 mg at 12/05/23 0837    sodium bicarbonate tablet 650 mg, 650 mg, Oral, TID, Quoc Elmore MD, 650 mg at 12/05/23 2057    sodium chloride 0.9 % bolus 500 mL, 500 mL, Intravenous, Once, Quoc Elmore MD    sodium chloride 0.9 % flush 10 mL, 10 mL, Intravenous, PRN, Quoc Elmore MD    Insert Peripheral IV, , , Once **AND** sodium chloride 0.9 % flush 10 mL, 10 mL, Intravenous, PRN, Quoc Elmore MD    sodium chloride 0.9 % flush 10 mL, 10 mL, Intravenous, Q12H, Quoc Elmore MD, 10 mL at 12/05/23 2059    sodium chloride 0.9 % flush 10 mL, 10 mL, Intravenous, PRN, Quoc Elmore MD    sodium chloride 0.9 % flush 10 mL, 10 mL, Intravenous, Q12H, Quoc Elmore MD, 10 mL at 12/05/23 2059    sodium chloride 0.9 % flush 10 mL, 10 mL, Intravenous, PRRamírez DAWN Frederick J, MD    sodium chloride 0.9 % infusion 40 mL, 40 mL, Intravenous, PRNRamírez Frederick J, MD    sodium chloride 0.9 % infusion 40 mL, 40 mL, Intravenous, Ramírez VELAZQUEZ Frederick J, MD    sodium chloride nasal spray 1 spray, 1 spray, Each Nare, Ramírez VELAZQUEZ Frederick J, MD, 1 spray at 12/02/23 2058    Objective     Vital  Signs  Temp:  [97.1 °F (36.2 °C)-98.4 °F (36.9 °C)] 97.5 °F (36.4 °C)  Heart Rate:  [59-76] 59  Resp:  [16] 16  BP: (131-157)/(63-77) 137/67  Body mass index is 24.34 kg/m².    Intake/Output Summary (Last 24 hours) at 12/6/2023 0857  Last data filed at 12/6/2023 0638  Gross per 24 hour   Intake 320 ml   Output 2340 ml   Net -2020 ml     No intake/output data recorded.     Physical Exam:   General: patient awake, alert and cooperative   Abdomen: soft, nontender, nondistended; normal bowel sounds     Results Review:     I reviewed the patient's new clinical results.  EGD from 12/5 and associated pathology    Results from last 7 days   Lab Units 12/06/23  0349 12/05/23  0318 12/04/23  0259   WBC 10*3/mm3 5.78 8.80 9.56   HEMOGLOBIN g/dL 7.8* 7.8* 8.0*   HEMATOCRIT % 25.7* 26.0* 26.2*   PLATELETS 10*3/mm3 257 262 249     Results from last 7 days   Lab Units 12/06/23  0349 12/05/23  0318 12/04/23  0259   SODIUM mmol/L 138 138 138   POTASSIUM mmol/L 4.5 5.0 4.8   CHLORIDE mmol/L 105 106 105   CO2 mmol/L 25.9 27.0 27.3   BUN mg/dL 20 39* 32*   CREATININE mg/dL 2.22* 3.22* 2.96*   CALCIUM mg/dL 8.1* 8.5* 8.2*   GLUCOSE mg/dL 116* 87 75         Lab Results   Lab Value Date/Time    LIPASE 27 11/28/2023 2119    LIPASE 31 11/24/2023 1836    LIPASE 33 12/13/2021 1614    LIPASE 58 11/30/2017 2155    LIPASE 30 08/27/2016 1627    LIPASE 30 02/14/2016 0635       Radiology:  [unfilled]      Assessment & Plan   Assessment:   ESRD on hemodialysis  Nausea and vomiting  Coffee-ground emesis   mild anemia   nausea and vomiting.  Resolved  Elevated troponin level with cardiology following    All problems new to me today    Plan:   Continue PPI, path reviewed and benign  Monitor H/H while on AC, HB stable    GI will sign off    I discussed the patient's findings and my recommendations with patient.          Grupo Lauren M.D.  [facility]  [address] [place] [id number]  Office: [phone]

## 2023-12-06 NOTE — PROGRESS NOTES
Name: Carlito Mo ADMIT: 2023   : 1955  PCP: Florencia Caballero MD    MRN: [id number] LOS: 7 days   AGE/SEX: 68 y.o. male  [facility]     Subjective   Subjective   No changes, no new complaints.  Still up in the bed.  Reports he did not sleep well.    Review of Systems   As above  Objective   Objective   Vital Signs  Temp:  [97.5 °F (36.4 °C)-98.4 °F (36.9 °C)] 97.7 °F (36.5 °C)  Heart Rate:  [59-76] 74  Resp:  [16] 16  BP: (128-157)/(55-77) 128/55  SpO2:  [97 %-100 %] 100 %  on   ;   Device (Oxygen Therapy): room air  Body mass index is 24.34 kg/m².  Physical Exam    General: Alert, sitting up in the bed, not in distress, clinical appearing  HEENT: Normocephalic, atraumatic  CV: Regular rate and rhythm, no murmurs rubs or gallops  Lungs: Clear to auscultation bilaterally, no crackles or wheezes  Abdomen: Soft, nontender, nondistended  Extremities: No significant peripheral edema , no cyanosis     Results Review     I reviewed the patient's new clinical results.  Results from last 7 days   Lab Units 23  0749   WBC 10*3/mm3 5.78 8.80 9.56 11.80*   HEMOGLOBIN g/dL 7.8* 7.8* 8.0* 8.6*   PLATELETS 10*3/mm3 257 262 249 257     Results from last 7 days   Lab Units 23  03423  0318 23  0259 23  0749   SODIUM mmol/L 138 138 138 137   POTASSIUM mmol/L 4.5 5.0 4.8 4.6   CHLORIDE mmol/L 105 106 105 106   CO2 mmol/L 25.9 27.0 27.3 26.3   BUN mg/dL 20 39* 32* 21   CREATININE mg/dL 2.22* 3.22* 2.96* 2.24*   GLUCOSE mg/dL 116* 87 75 92   Estimated Creatinine Clearance: 30.8 mL/min (A) (by C-G formula based on SCr of 2.22 mg/dL (H)).  Results from last 7 days   Lab Units 23  03423  0318 23  0259 23  2034   ALBUMIN g/dL 2.5* 2.8* 2.8* 3.3*     Results from last 7 days   Lab Units 23  0349 23  0318 23  0259 23  0749 23  2034 23  0903 23  0040   CALCIUM mg/dL 8.1* 8.5* 8.2*  8.4* 8.3*   < > 8.6   ALBUMIN g/dL 2.5* 2.8* 2.8*  --  3.3*   < > 3.0*   MAGNESIUM mg/dL  --   --  1.8  --   --   --  2.0   PHOSPHORUS mg/dL 2.9 2.9 2.2*  --  1.1*   < > 3.0    < > = values in this interval not displayed.           COVID19   Date Value Ref Range Status   05/02/2023 Not Detected Not Detected - Ref. Range Final   03/07/2023 Not Detected Not Detected - Ref. Range Final     Glucose   Date/Time Value Ref Range Status   12/06/2023 1136 165 (H) 70 - 130 mg/dL Final   12/06/2023 0640 110 70 - 130 mg/dL Final   12/05/2023 2035 169 (H) 70 - 130 mg/dL Final   12/05/2023 1124 136 (H) 70 - 130 mg/dL Final   12/05/2023 0646 77 70 - 130 mg/dL Final   12/04/2023 2142 200 (H) 70 - 130 mg/dL Final   12/04/2023 1641 77 70 - 130 mg/dL Final           XR Chest 1 View  Narrative: SINGLE VIEW OF THE CHEST     HISTORY: Shortness of breath     COMPARISON: November 2030 is 23     FINDINGS:  The patient status post median sternotomy with coronary artery bypass  grafting. There is cardiomegaly. There is calcification of the aorta. No  pneumothorax or pleural effusion is seen. No definite acute infiltrates  are identified.     Impression: No acute findings.     This report was finalized on 11/28/2023 9:51 PM by Dr. Alice Allen M.D on Workstation: [id number]       Scheduled Medications  apixaban, 2.5 mg, Oral, Q12H  budesonide-formoterol, 2 puff, Inhalation, BID - RT  carvedilol, 6.25 mg, Oral, BID With Meals  cetirizine, 10 mg, Oral, Daily  insulin glargine, 2 Units, Subcutaneous, Nightly  insulin regular, 2-7 Units, Subcutaneous, 4x Daily AC & at Bedtime  levothyroxine, 75 mcg, Oral, Q AM  pantoprazole, 40 mg, Oral, BID AC  rosuvastatin, 40 mg, Oral, Daily  sodium bicarbonate, 650 mg, Oral, TID  sodium chloride, 500 mL, Intravenous, Once  sodium chloride, 10 mL, Intravenous, Q12H  sodium chloride, 10 mL, Intravenous, Q12H    Infusions     Diet  Diet: Diabetic Diets; Consistent Carbohydrate; Texture: Regular Texture  (IDDSI 7); Fluid Consistency: Thin (IDDSI 0)    I have personally reviewed     [x]  Laboratory   []  Microbiology   []  Radiology   []  EKG/Telemetry  []  Cardiology/Vascular   []  Pathology    []  Records       Assessment/Plan     Active Hospital Problems    Diagnosis  POA   • **Atrial fibrillation with rapid ventricular response [I48.91]  Yes   • ESRD (end stage renal disease) [N18.6]  Yes   • Nausea & vomiting [R11.2]  Yes   • Hemoptysis [R04.2]  Yes   • Coffee ground emesis [K92.0]  Unknown   • Embolic stroke [I63.9]  Yes   • Coronary artery disease involving native coronary artery of native heart without angina pectoris [I25.10]  Yes   • Chronic combined systolic and diastolic CHF (congestive heart failure) [I50.42]  Yes   • Acquired hypothyroidism [E03.9]  Yes   • Benign prostatic hyperplasia with nocturia [N40.1, R35.1]  Yes   • Type 2 diabetes mellitus, with long-term current use of insulin [E11.9, Z79.4]  Not Applicable      Resolved Hospital Problems   No resolved problems to display.       68 y.o. male admitted with Atrial fibrillation with rapid ventricular response.    GI bleeding/coffee ground emesis/diarrhea-  -possible viral gastroenteritis  GI evaluated, status post EGD 12/04/2023 which showed hiatal hernia and LA grade B esophagitis.  Biopsies consistent with chronic inflammation without evidence of intestinal metaplasia or H. pylori   -Status post IV PPI continue p.o. pantoprazole 40 mg twice daily  -Eliquis resumed at lower dose 2.5 mg twice daily 12/04.  If remains stable with no further signs of bleeding or worsening anemia, plan to increase Eliquis to 5 mg twice daily, timing to be determined.           Afib with rvr-s/p diltiazem drip. On low dose coreg, Eliquis as above.  Cardiology following.    Elevated troponin due to ESRD and type 2 NSTEMI from rvr-no plans for ischemic workup at this time.    Metabolic encephalopathy-resolved    Coronary artery disease s/p CABG in 2001-statin/bb. Asa on  hold    Chronic combined systolic and diastolic heart failure-bb. Torsemide is held. euvolemic    ESRD on HD T, TH, Sa-nephrology is managing dialysis    Anemia of chronic disease-on HAI as an outpatient    Type 2 diabetes-A1c 6.9. He is on a very low dose of glargine as an outpatient. Continue sliding scale.    COPD with chronic respiratory failure with hypoxia on home O2-symbicort    Hypothyroidism-synthroid    History of embolic CVA-eliquis on hold as above    Nasal congestion-zyrtec and saline nasal spray    DVT prophylax: Eliquis  Full code.  Discussed with patient.  Anticipate discharge to SNU, medically stable to be discharged.      Copied text in this note has been reviewed and is accurate as of 12/06/23.         Dictated utilizing Dragon dictation        Aria Red MD  [facility]  12/06/23  15:35 EST

## 2023-12-06 NOTE — PLAN OF CARE
Goal Outcome Evaluation:  Plan of Care Reviewed With: patient        Progress: no change  Outcome Evaluation: Pt seen for PT treatment today. Pt agreeable to participate. Pt is Maynor/ModA with supine to sit with pt needing increased time and sequencing cues. Pt with kyphotic posture while sitting EOB. Pt required MinAX2/ModA with STS transfers. Pt was able to ambulate 30ft with rwx, Maynor. Pt is a little unsteady but no LOB. Cues for posture correction. Fatigues quickly.  Will continue to progress pt as able.

## 2023-12-06 NOTE — CASE MANAGEMENT/SOCIAL WORK
Continued Stay Note  [facility]     Patient Name: Carlito Mo  MRN: [id number]  Today's Date: 12/6/2023    Admit Date: 11/28/2023    Plan: [facility] with onsite HD when HD setup complete and pre-cert obtained.   Discharge Plan       Row Name 12/06/23 1330       Plan    Plan [facility] with onsite HD when HD setup complete and pre-cert obtained.    Patient/Family in Agreement with Plan yes    Plan Comments CCP placed call to [facility] to confirm when final Hepatitis labs will be finalized. Per [facility] Hepatitis B Core will result tomorrow (12/7) afternoon. [facility]-Paul/[facility] notified. Jean-Paul/[facility] stated can initiate pre-cert once labs are finalized. Alejandra BENZ/CCP                   Discharge Codes    No documentation.                 Expected Discharge Date and Time       Expected Discharge Date Expected Discharge Time    Dec 8, 2023               Marivel Hernandez RN

## 2023-12-06 NOTE — PROGRESS NOTES
"    Patient Name: Carlito Mo  :1955  68 y.o.      Patient Care Team:  Florencia Caballero MD as PCP - General (Internal Medicine)  Amber Murguia MD as Consulting Physician (Cardiology)  Sera La [facility] as Pharmacist  Seth Ladd MD as Surgeon (General Surgery)  Kim Hoyos MD PhD as Consulting Physician (Hematology and Oncology)  Jerome Diallo MD as Referring Physician (Family Medicine)  Jose Enrique Louie MD as Consulting Physician (Gastroenterology)  Nima Huddleston MD as Consulting Physician (Nephrology)    Chief Complaint: follow up CHF, PAF    Interval History:  Hgb low but unchanged from yesterday. Had HD yesterday. Has some blood when he blows his knows. No evidence of GI bleeding subjectively per patient.        Objective   Vital Signs  Temp:  [97.1 °F (36.2 °C)-98.4 °F (36.9 °C)] 97.5 °F (36.4 °C)  Heart Rate:  [59-76] 59  Resp:  [16] 16  BP: (131-157)/(63-77) 137/67    Intake/Output Summary (Last 24 hours) at 2023 0851  Last data filed at 2023 0638  Gross per 24 hour   Intake 320 ml   Output 2340 ml   Net -2020 ml     Flowsheet Rows      Flowsheet Row First Filed Value   Admission Height 167.6 cm (66\") Documented at 2023 0615   Admission Weight 61.3 kg (135 lb 2.3 oz) Documented at 2023 0340            Physical Exam:   General Appearance:    Alert, cooperative, in no acute distress   Lungs:     Clear to auscultation.  Normal respiratory effort and rate.      Heart:    Regular rhythm and normal rate, normal S1 and S2, no murmurs, gallops or rubs.     Chest Wall:    No abnormalities observed   Abdomen:     Soft, nontender, positive bowel sounds.     Extremities:   no cyanosis, clubbing or edema.  No marked joint deformities.  Adequate musculoskeletal strength.       Results Review:    Results from last 7 days   Lab Units 23  0349   SODIUM mmol/L 138   POTASSIUM mmol/L 4.5   CHLORIDE mmol/L 105   CO2 mmol/L 25.9   BUN mg/dL 20   CREATININE " mg/dL 2.22*   GLUCOSE mg/dL 116*   CALCIUM mg/dL 8.1*     Results from last 7 days   Lab Units 11/30/23  0226 11/30/23  0040   HSTROP T ng/L 170* 173*     Results from last 7 days   Lab Units 12/06/23  0349   WBC 10*3/mm3 5.78   HEMOGLOBIN g/dL 7.8*   HEMATOCRIT % 25.7*   PLATELETS 10*3/mm3 257           Results from last 7 days   Lab Units 12/04/23  0259   MAGNESIUM mg/dL 1.8                   Medication Review:   apixaban, 2.5 mg, Oral, Q12H  budesonide-formoterol, 2 puff, Inhalation, BID - RT  carvedilol, 6.25 mg, Oral, BID With Meals  cetirizine, 10 mg, Oral, Daily  insulin glargine, 2 Units, Subcutaneous, Nightly  insulin regular, 2-7 Units, Subcutaneous, 4x Daily AC & at Bedtime  levothyroxine, 75 mcg, Oral, Q AM  pantoprazole, 40 mg, Oral, BID AC  rosuvastatin, 40 mg, Oral, Daily  sodium bicarbonate, 650 mg, Oral, TID  sodium chloride, 500 mL, Intravenous, Once  sodium chloride, 10 mL, Intravenous, Q12H  sodium chloride, 10 mL, Intravenous, Q12H                Assessment & Plan   Recent vomiting and diarrhea, possible viral gastroenteritis  Acute on chronic anemia with coffee ground emesis, no obvious bleeding source by EGD 12/4/2023  Paroxysmal atrial fibrillation  Chronic combined systolic and diastolic CHF, EF 40-45%. Compensated.   Coronary artery disease status post CABG 2001  History of embolic CVA  ESRD on hemodialysis   COPD with acute exacerbation  RBBB, chronic  Elevated HS troponin secondary to renal disease  Diabetes mellitus type II on chronic insulin    Apixaban has been restarted but at lower dose than indicated. If he tolerates this without significant bleeding or worsening anemia, he will eventually need to be on 5mg BID.  Hgb remains low at 7.8. would keep at 2.5 and reassess in a few weeks.     HR an BP are stable on current regimen.     Volume status appears stable and managed by HD - scheduled for HD today.     JI Mcmullen  [facility]  12/06/23  08:51 EST

## 2023-12-07 LAB
ABO GROUP BLD: NORMAL
ALBUMIN SERPL-MCNC: 2.9 G/DL (ref 3.5–5.2)
ANION GAP SERPL CALCULATED.3IONS-SCNC: 8.4 MMOL/L (ref 5–15)
BLD GP AB SCN SERPL QL: NEGATIVE
BUN SERPL-MCNC: 29 MG/DL (ref 8–23)
BUN/CREAT SERPL: 9.4 (ref 7–25)
CALCIUM SPEC-SCNC: 8.4 MG/DL (ref 8.6–10.5)
CHLORIDE SERPL-SCNC: 104 MMOL/L (ref 98–107)
CO2 SERPL-SCNC: 24.6 MMOL/L (ref 22–29)
CREAT SERPL-MCNC: 3.07 MG/DL (ref 0.76–1.27)
DEPRECATED RDW RBC AUTO: 40.6 FL (ref 37–54)
EGFRCR SERPLBLD CKD-EPI 2021: 21.3 ML/MIN/1.73
ERYTHROCYTE [DISTWIDTH] IN BLOOD BY AUTOMATED COUNT: 13.3 % (ref 12.3–15.4)
GLUCOSE BLDC GLUCOMTR-MCNC: 101 MG/DL (ref 70–130)
GLUCOSE BLDC GLUCOMTR-MCNC: 207 MG/DL (ref 70–130)
GLUCOSE BLDC GLUCOMTR-MCNC: 92 MG/DL (ref 70–130)
GLUCOSE SERPL-MCNC: 88 MG/DL (ref 65–99)
HBV CORE AB SERPL QL IA: NEGATIVE
HCT VFR BLD AUTO: 26.4 % (ref 37.5–51)
HGB BLD-MCNC: 8.1 G/DL (ref 13–17.7)
MCH RBC QN AUTO: 26 PG (ref 26.6–33)
MCHC RBC AUTO-ENTMCNC: 30.7 G/DL (ref 31.5–35.7)
MCV RBC AUTO: 84.9 FL (ref 79–97)
PHOSPHATE SERPL-MCNC: 3.1 MG/DL (ref 2.5–4.5)
PLATELET # BLD AUTO: 296 10*3/MM3 (ref 140–450)
PMV BLD AUTO: 9.8 FL (ref 6–12)
POTASSIUM SERPL-SCNC: 4.5 MMOL/L (ref 3.5–5.2)
RBC # BLD AUTO: 3.11 10*6/MM3 (ref 4.14–5.8)
RH BLD: POSITIVE
SODIUM SERPL-SCNC: 137 MMOL/L (ref 136–145)
T&S EXPIRATION DATE: NORMAL
WBC NRBC COR # BLD AUTO: 8.91 10*3/MM3 (ref 3.4–10.8)

## 2023-12-07 PROCEDURE — 82948 REAGENT STRIP/BLOOD GLUCOSE: CPT

## 2023-12-07 PROCEDURE — 86900 BLOOD TYPING SEROLOGIC ABO: CPT | Performed by: INTERNAL MEDICINE

## 2023-12-07 PROCEDURE — 85027 COMPLETE CBC AUTOMATED: CPT | Performed by: INTERNAL MEDICINE

## 2023-12-07 PROCEDURE — 94799 UNLISTED PULMONARY SVC/PX: CPT

## 2023-12-07 PROCEDURE — 94761 N-INVAS EAR/PLS OXIMETRY MLT: CPT

## 2023-12-07 PROCEDURE — 86901 BLOOD TYPING SEROLOGIC RH(D): CPT | Performed by: INTERNAL MEDICINE

## 2023-12-07 PROCEDURE — 94664 DEMO&/EVAL PT USE INHALER: CPT

## 2023-12-07 PROCEDURE — 63710000001 INSULIN REGULAR HUMAN PER 5 UNITS: Performed by: INTERNAL MEDICINE

## 2023-12-07 PROCEDURE — 99232 SBSQ HOSP IP/OBS MODERATE 35: CPT | Performed by: NURSE PRACTITIONER

## 2023-12-07 PROCEDURE — 86850 RBC ANTIBODY SCREEN: CPT | Performed by: INTERNAL MEDICINE

## 2023-12-07 PROCEDURE — 80069 RENAL FUNCTION PANEL: CPT | Performed by: INTERNAL MEDICINE

## 2023-12-07 RX ORDER — ROSUVASTATIN CALCIUM 10 MG/1
10 TABLET, COATED ORAL DAILY
Status: DISCONTINUED | OUTPATIENT
Start: 2023-12-08 | End: 2023-12-11 | Stop reason: HOSPADM

## 2023-12-07 RX ADMIN — APIXABAN 2.5 MG: 2.5 TABLET, FILM COATED ORAL at 08:12

## 2023-12-07 RX ADMIN — APIXABAN 2.5 MG: 2.5 TABLET, FILM COATED ORAL at 21:00

## 2023-12-07 RX ADMIN — PANTOPRAZOLE SODIUM 40 MG: 40 TABLET, DELAYED RELEASE ORAL at 05:11

## 2023-12-07 RX ADMIN — SODIUM BICARBONATE 650 MG: 650 TABLET, ORALLY DISINTEGRATING ORAL at 08:12

## 2023-12-07 RX ADMIN — INSULIN HUMAN 3 UNITS: 100 INJECTION, SOLUTION PARENTERAL at 18:22

## 2023-12-07 RX ADMIN — Medication 10 ML: at 08:13

## 2023-12-07 RX ADMIN — BUDESONIDE AND FORMOTEROL FUMARATE DIHYDRATE 2 PUFF: 160; 4.5 AEROSOL RESPIRATORY (INHALATION) at 06:29

## 2023-12-07 RX ADMIN — Medication 10 ML: at 21:00

## 2023-12-07 RX ADMIN — CARVEDILOL 6.25 MG: 6.25 TABLET, FILM COATED ORAL at 18:22

## 2023-12-07 RX ADMIN — SODIUM BICARBONATE 650 MG: 650 TABLET, ORALLY DISINTEGRATING ORAL at 16:10

## 2023-12-07 RX ADMIN — ROSUVASTATIN CALCIUM 40 MG: 40 TABLET, FILM COATED ORAL at 08:12

## 2023-12-07 RX ADMIN — LEVOTHYROXINE SODIUM 75 MCG: 75 TABLET ORAL at 05:11

## 2023-12-07 RX ADMIN — BUDESONIDE AND FORMOTEROL FUMARATE DIHYDRATE 2 PUFF: 160; 4.5 AEROSOL RESPIRATORY (INHALATION) at 20:35

## 2023-12-07 RX ADMIN — PANTOPRAZOLE SODIUM 40 MG: 40 TABLET, DELAYED RELEASE ORAL at 18:22

## 2023-12-07 RX ADMIN — SODIUM BICARBONATE 650 MG: 650 TABLET, ORALLY DISINTEGRATING ORAL at 21:00

## 2023-12-07 RX ADMIN — CETIRIZINE HYDROCHLORIDE 10 MG: 10 TABLET ORAL at 08:12

## 2023-12-07 NOTE — SIGNIFICANT NOTE
12/07/23 1228   Post Acute Pre-Cert Documentation   Request Submitted by Facility - Type: Hospital   Post-Acute Authorization Type Submitted: SNF   Date Post Acute Pre-Cert Inititated per Facility 12/07/23   Accepting Facility [facility] Discharge Date Requested 12/08/23   All Clinicals Submitted? Yes   Had Accepting Facility at Time of Submission Yes   Response Communicated to:    Authorization Number: [id number] - PRECERT STARTED W/FRANCISCO AT [facility] BY PHONE. I WILL F/U ON CASE STATUS LATER TODAY.   Post Acute Pre-Cert Initiated Comment CÉSAR JENNINGS

## 2023-12-07 NOTE — PROGRESS NOTES
Name: Carlito Mo ADMIT: 2023   : 1955  PCP: Florencia Caballero MD    MRN: [id number] LOS: 8 days   AGE/SEX: 68 y.o. male  ROOM: [facility]     Subjective   Subjective   No changes, no new complaints.  Laying in bed.  Denies complaints.  Pending placement to rehab.    Review of Systems   As above  Objective   Objective   Vital Signs  Temp:  [97.7 °F (36.5 °C)-98.4 °F (36.9 °C)] 97.7 °F (36.5 °C)  Heart Rate:  [68-79] 77  Resp:  [18] 18  BP: (120-149)/(47-71) 149/71  SpO2:  [95 %-99 %] 95 %  on   ;   Device (Oxygen Therapy): room air  Body mass index is 23.56 kg/m².  Physical Exam    General: Alert, laying in bed, not in distress, chronically ill appearing  HEENT: Normocephalic, atraumatic  CV: Regular rate and rhythm, no murmurs   Lungs: Clear to auscultation bilaterally, no wheezing  Abdomen: Soft, nontender, nondistended  Extremities: No significant peripheral edema , no cyanosis     Results Review     I reviewed the patient's new clinical results.  Results from last 7 days   Lab Units 23  03523  0259   WBC 10*3/mm3 8.91 5.78 8.80 9.56   HEMOGLOBIN g/dL 8.1* 7.8* 7.8* 8.0*   PLATELETS 10*3/mm3 296 257 262 249     Results from last 7 days   Lab Units 23  0357 238 23  0259   SODIUM mmol/L 137 138 138 138   POTASSIUM mmol/L 4.5 4.5 5.0 4.8   CHLORIDE mmol/L 104 105 106 105   CO2 mmol/L 24.6 25.9 27.0 27.3   BUN mg/dL 29* 20 39* 32*   CREATININE mg/dL 3.07* 2.22* 3.22* 2.96*   GLUCOSE mg/dL 88 116* 87 75   Estimated Creatinine Clearance: 21.6 mL/min (A) (by C-G formula based on SCr of 3.07 mg/dL (H)).  Results from last 7 days   Lab Units 23  0357 23  0349 23  0318 23  0259   ALBUMIN g/dL 2.9* 2.5* 2.8* 2.8*     Results from last 7 days   Lab Units 23  0357 23  0349 23  0318 23  0259   CALCIUM mg/dL 8.4* 8.1* 8.5* 8.2*   ALBUMIN g/dL 2.9* 2.5* 2.8* 2.8*   MAGNESIUM mg/dL  --    --   --  1.8   PHOSPHORUS mg/dL 3.1 2.9 2.9 2.2*           COVID19   Date Value Ref Range Status   05/02/2023 Not Detected Not Detected - Ref. Range Final   03/07/2023 Not Detected Not Detected - Ref. Range Final     Glucose   Date/Time Value Ref Range Status   12/07/2023 0633 92 70 - 130 mg/dL Final   12/06/2023 2034 102 70 - 130 mg/dL Final   12/06/2023 1629 188 (H) 70 - 130 mg/dL Final   12/06/2023 1136 165 (H) 70 - 130 mg/dL Final   12/06/2023 0640 110 70 - 130 mg/dL Final   12/05/2023 2035 169 (H) 70 - 130 mg/dL Final   12/05/2023 1124 136 (H) 70 - 130 mg/dL Final           XR Chest 1 View  Narrative: SINGLE VIEW OF THE CHEST     HISTORY: Shortness of breath     COMPARISON: November 2030 is 23     FINDINGS:  The patient status post median sternotomy with coronary artery bypass  grafting. There is cardiomegaly. There is calcification of the aorta. No  pneumothorax or pleural effusion is seen. No definite acute infiltrates  are identified.     Impression: No acute findings.     This report was finalized on 11/28/2023 9:51 PM by Dr. Alice Allen M.D on Workstation: [id number]       Scheduled Medications  apixaban, 2.5 mg, Oral, Q12H  budesonide-formoterol, 2 puff, Inhalation, BID - RT  carvedilol, 6.25 mg, Oral, BID With Meals  cetirizine, 10 mg, Oral, Daily  insulin glargine, 2 Units, Subcutaneous, Nightly  insulin regular, 2-7 Units, Subcutaneous, 4x Daily AC & at Bedtime  levothyroxine, 75 mcg, Oral, Q AM  pantoprazole, 40 mg, Oral, BID AC  rosuvastatin, 40 mg, Oral, Daily  sodium bicarbonate, 650 mg, Oral, TID  sodium chloride, 500 mL, Intravenous, Once  sodium chloride, 10 mL, Intravenous, Q12H  sodium chloride, 10 mL, Intravenous, Q12H    Infusions     Diet  Diet: Diabetic Diets; Consistent Carbohydrate; Texture: Regular Texture (IDDSI 7); Fluid Consistency: Thin (IDDSI 0)    I have personally reviewed     [x]  Laboratory   []  Microbiology   []  Radiology   []  EKG/Telemetry  []  Cardiology/Vascular    []  Pathology    []  Records       Assessment/Plan     Active Hospital Problems    Diagnosis  POA    **Atrial fibrillation with rapid ventricular response [I48.91]  Yes    ESRD (end stage renal disease) [N18.6]  Yes    Nausea & vomiting [R11.2]  Yes    Hemoptysis [R04.2]  Yes    Coffee ground emesis [K92.0]  Unknown    Embolic stroke [I63.9]  Yes    Coronary artery disease involving native coronary artery of native heart without angina pectoris [I25.10]  Yes    Chronic combined systolic and diastolic CHF (congestive heart failure) [I50.42]  Yes    Acquired hypothyroidism [E03.9]  Yes    Benign prostatic hyperplasia with nocturia [N40.1, R35.1]  Yes    Type 2 diabetes mellitus, with long-term current use of insulin [E11.9, Z79.4]  Not Applicable      Resolved Hospital Problems   No resolved problems to display.       68 y.o. male admitted with Atrial fibrillation with rapid ventricular response.    GI bleeding/coffee ground emesis/diarrhea-  -possible viral gastroenteritis  GI evaluated, status post EGD 12/04/2023 which showed hiatal hernia and LA grade B esophagitis.  Biopsies consistent with chronic inflammation without evidence of intestinal metaplasia or H. pylori   -Status post IV PPI continue p.o. pantoprazole 40 mg twice daily  -Eliquis resumed at lower dose 2.5 mg twice daily 12/04.  Cardiology recommended to continue low-dose follow-up couple weeks          Afib with rvr-s/p diltiazem drip. On low dose coreg, Eliquis as above.  Cardiolog evaluated..  Will need follow-up outpatient in 2 weeks.    Elevated troponin due to ESRD and type 2 NSTEMI from rvr-no plans for ischemic workup at this time.    Metabolic encephalopathy-resolved    Coronary artery disease s/p CABG in 2001-statin/bb. Asa on hold,  on Eliquis.    Chronic combined systolic and diastolic heart failure-bb. Torsemide is held. euvolemic    ESRD on HD T, TH, Sa-nephrology is managing dialysis    Anemia of chronic disease-on HAI as an  outpatient    Type 2 diabetes-A1c 6.9. He is on a very low dose of glargine as an outpatient. Continue sliding scale.    COPD with chronic respiratory failure with hypoxia on home O2-symbicort    Hypothyroidism-synthroid    History of embolic CVA-on Eliquis    Nasal congestion-zyrtec and saline nasal spray    DVT prophylax: Eliquis  Full code.  Discussed with patient.  Anticipate discharge to SNU, pending pre-CERT, medically stable to be discharged.      Copied text in this note has been reviewed and is accurate as of 12/07/23.         Dictated utilizing Dragon dictation        Aria Red MD  [facility]  12/07/23  13:54 EST

## 2023-12-07 NOTE — CASE MANAGEMENT/SOCIAL WORK
Continued Stay Note  [facility]     Patient Name: Carlito Mo  MRN: [id number]  Today's Date: 12/7/2023    Admit Date: 11/28/2023    Plan: [facility], pre-cert initiated.   Discharge Plan       Row Name 12/07/23 0951       Plan    Plan [facility], pre-cert initiated.    Patient/Family in Agreement with Plan yes    Plan Comments Hepatitis B lab results obtained and emailed to Jean-Paul/[facility]. Per Jean-Paul/[facility] enamorado to initiate pre-cert and will notify when HD setup completed at [facility]. CCP placed outbound call to Lissette, pts spouse, to update. CCP explained [facility] has accepted, bed available (waiting on final HD setup) and pre-cert initiated. Lissette verbalized understanding. CCP discussed transport at time of DC. Lissette is agreeable to [facility] transport. Pt submitted to Post Acute Authorizations to initiate pre-cert. Partial packet in CCP office, pharmacy updated to [facility]. Alejandra BENZ/GISELLE                   Discharge Codes    No documentation.                 Expected Discharge Date and Time       Expected Discharge Date Expected Discharge Time    Dec 8, 2023               Marivel Hernandez RN

## 2023-12-07 NOTE — SIGNIFICANT NOTE
12/07/23 1518   OTHER   Discipline physical therapist   Rehab Time/Intention   Session Not Performed other (see comments)  (Pt off floor in am for dialysis; pt declined PT services in PT stating that he was very tired from dialysis. f/u 12/8 if appropriate. Nurse aware and in agreement.)   Recommendation   PT - Next Appointment 12/08/23

## 2023-12-07 NOTE — PROGRESS NOTES
[facility] Progress Note      Patient Name: Carlito Mo  : 1955  MRN: [id number]  Primary Care Physician:  Florencia Caballero MD  Date of admission: 2023    Subjective     Interval History:   Follow-up ESRD.  On dialysis.  .  Blood pressure 96 systolic.  Goal turned off.  He is tired but otherwise okay.  Throat less sore today.  Able to eat.  Not short of breath.  Movement.  Passing gas.    Review of Systems:   As noted above    Objective     Vitals:   Temp:  [97.7 °F (36.5 °C)-98.4 °F (36.9 °C)] 97.7 °F (36.5 °C)  Heart Rate:  [68-79] 77  Resp:  [16-18] 18  BP: (120-149)/(47-71) 149/71    Intake/Output Summary (Last 24 hours) at 2023 1245  Last data filed at 2023 0508  Gross per 24 hour   Intake 220 ml   Output 775 ml   Net -555 ml       Physical Exam:    General Appearance: Chronically ill.  BP, but awakens easily.  On dialysis.   . no acute distress   Skin: warm and dry  HEENT: Mucosa dry nonicteric sclera  Neck: supple, no JVD  Lungs clear to auscultation bilaterally  Heart: irreg,irreg   Abdomen: soft, nontender, nondistended.+bs   Extremities: no edema.  Right upper extremity AV fistula.      Scheduled Meds:     apixaban, 2.5 mg, Oral, Q12H  budesonide-formoterol, 2 puff, Inhalation, BID - RT  carvedilol, 6.25 mg, Oral, BID With Meals  cetirizine, 10 mg, Oral, Daily  insulin glargine, 2 Units, Subcutaneous, Nightly  insulin regular, 2-7 Units, Subcutaneous, 4x Daily AC & at Bedtime  levothyroxine, 75 mcg, Oral, Q AM  pantoprazole, 40 mg, Oral, BID AC  rosuvastatin, 40 mg, Oral, Daily  sodium bicarbonate, 650 mg, Oral, TID  sodium chloride, 500 mL, Intravenous, Once  sodium chloride, 10 mL, Intravenous, Q12H  sodium chloride, 10 mL, Intravenous, Q12H      IV Meds:          Results Reviewed:   I have personally reviewed the results from the time of this admission to 2023 12:45 EST     Results from last 7 days   Lab Units 23  0350  12/06/23 0349 12/05/23 0318   SODIUM mmol/L 137 138 138   POTASSIUM mmol/L 4.5 4.5 5.0   CHLORIDE mmol/L 104 105 106   CO2 mmol/L 24.6 25.9 27.0   BUN mg/dL 29* 20 39*   CREATININE mg/dL 3.07* 2.22* 3.22*   CALCIUM mg/dL 8.4* 8.1* 8.5*   GLUCOSE mg/dL 88 116* 87       Estimated Creatinine Clearance: 21.6 mL/min (A) (by C-G formula based on SCr of 3.07 mg/dL (H)).    Results from last 7 days   Lab Units 12/07/23 0357 12/06/23 0349 12/05/23 0318 12/04/23 0259   MAGNESIUM mg/dL  --   --   --  1.8   PHOSPHORUS mg/dL 3.1 2.9 2.9 2.2*             Results from last 7 days   Lab Units 12/07/23 0357 12/06/23 0349 12/05/23 0318 12/04/23 0259 12/03/23  0749   WBC 10*3/mm3 8.91 5.78 8.80 9.56 11.80*   HEMOGLOBIN g/dL 8.1* 7.8* 7.8* 8.0* 8.6*   PLATELETS 10*3/mm3 296 257 262 249 257               Assessment / Plan     ASSESSMENT:  1..  ESRD.  Dialysis today.  Outpatient dialysis being arranged with rehab.  If he will be on dialysis at rehab Monday Wednesday Friday and is not leaving until tomorrow would dialyze him here first thing in the morning.  2. Paroxysmal A-fib with rapid ventricular response.  On Coreg and Eliquis (lower dose).  3.  GI bleed with coffee ground emesis.  Esophagitis on EGD.  Chronic inflammation.  On pantoprazole.  4.  Type II NSTEMI.  5.  Chronic combined systolic and diastolic heart failure.  Removing volume with dialysis as blood pressure allows..  6.  Anemia of chronic kidney disease.  On HAI as outpatient  7.  Hypothyroidism on replacement.  8.  History of embolic CVA  9. DM2    PLAN:  Hemodialysis today.  2.  CP to keep me posted on what his outpatient dialysis days will be at rehab.  Thank you for involving us in the care of Carlito JANINE Mo.  Please feel free to call with any questions.    Zahida Hsu MD  12/07/23  12:45 EST    [facility]  [phone]    Please note that portions of this note were completed with a voice recognition program.

## 2023-12-07 NOTE — PROGRESS NOTES
"    Patient Name: Carlito Mo  :1955  68 y.o.      Patient Care Team:  Florencia Caballero MD as PCP - General (Internal Medicine)  Amber Murguia MD as Consulting Physician (Cardiology)  [facility] as Pharmacist  Seth Ladd MD as Surgeon (General Surgery)  Kim Hoyos MD PhD as Consulting Physician (Hematology and Oncology)  Jerome Diallo MD as Referring Physician (Family Medicine)  Jose Enrique Louie MD as Consulting Physician (Gastroenterology)  Nima Huddleston MD as Consulting Physician (Nephrology)    Chief Complaint: follow up CHF, PAF    Interval History:  Hgb stable. He is on dialysis. Feels tired but otherwise no complaints today.       Objective   Vital Signs  Temp:  [97.7 °F (36.5 °C)-98.4 °F (36.9 °C)] 97.7 °F (36.5 °C)  Heart Rate:  [68-79] 77  Resp:  [16-18] 18  BP: (120-149)/(47-71) 149/71    Intake/Output Summary (Last 24 hours) at 2023 1207  Last data filed at 2023 0508  Gross per 24 hour   Intake 220 ml   Output 775 ml   Net -555 ml     Flowsheet Rows      Flowsheet Row First Filed Value   Admission Height 167.6 cm (66\") Documented at 2023 0615   Admission Weight 61.3 kg (135 lb 2.3 oz) Documented at 2023 0340            Physical Exam:   General Appearance:    Alert, cooperative, in no acute distress   Lungs:     Clear to auscultation.  Normal respiratory effort and rate.      Heart:    Regular rhythm and normal rate, normal S1 and S2, no murmurs, gallops or rubs.     Chest Wall:    No abnormalities observed   Abdomen:     Soft, nontender, positive bowel sounds.     Extremities:   no cyanosis, clubbing or edema.  No marked joint deformities.  Adequate musculoskeletal strength.       Results Review:    Results from last 7 days   Lab Units 23  0357   SODIUM mmol/L 137   POTASSIUM mmol/L 4.5   CHLORIDE mmol/L 104   CO2 mmol/L 24.6   BUN mg/dL 29*   CREATININE mg/dL 3.07*   GLUCOSE mg/dL 88   CALCIUM mg/dL 8.4*           Results " from last 7 days   Lab Units 12/07/23  0357   WBC 10*3/mm3 8.91   HEMOGLOBIN g/dL 8.1*   HEMATOCRIT % 26.4*   PLATELETS 10*3/mm3 296           Results from last 7 days   Lab Units 12/04/23  0259   MAGNESIUM mg/dL 1.8                   Medication Review:   apixaban, 2.5 mg, Oral, Q12H  budesonide-formoterol, 2 puff, Inhalation, BID - RT  carvedilol, 6.25 mg, Oral, BID With Meals  cetirizine, 10 mg, Oral, Daily  insulin glargine, 2 Units, Subcutaneous, Nightly  insulin regular, 2-7 Units, Subcutaneous, 4x Daily AC & at Bedtime  levothyroxine, 75 mcg, Oral, Q AM  pantoprazole, 40 mg, Oral, BID AC  rosuvastatin, 40 mg, Oral, Daily  sodium bicarbonate, 650 mg, Oral, TID  sodium chloride, 500 mL, Intravenous, Once  sodium chloride, 10 mL, Intravenous, Q12H  sodium chloride, 10 mL, Intravenous, Q12H                Assessment & Plan   Recent vomiting and diarrhea, possible viral gastroenteritis  Acute on chronic anemia with coffee ground emesis, no obvious bleeding source by EGD 12/4/2023  Paroxysmal atrial fibrillation  Chronic combined systolic and diastolic CHF, EF 40-45%. Compensated.   Coronary artery disease status post CABG 2001  History of embolic CVA  ESRD on hemodialysis   COPD with acute exacerbation  RBBB, chronic  Elevated HS troponin secondary to renal disease  Diabetes mellitus type II on chronic insulin    Apixaban has been restarted but at lower dose than indicated. If he tolerates this without significant bleeding or worsening anemia, he will eventually need to be on 5mg BID.  Hgb remains low at 7.8. would keep at 2.5 and reassess in a few weeks. Will arrange appt with Nida in 2 weeks.     HR an BP are stable on current regimen.     Volume status appears stable and managed by HD - scheduled for HD today.     Nothing further to add. Will see as needed.     JI Mcmullen  [facility]  12/07/23  12:07 EST

## 2023-12-07 NOTE — PLAN OF CARE
Goal Outcome Evaluation:  Plan of Care Reviewed With: patient        Progress: no change  Outcome Evaluation: HD treatment completed today.  Plan to discharge to skilled unit that has dialysis chair once precert received.

## 2023-12-07 NOTE — PLAN OF CARE
Goal Outcome Evaluation:  Plan of Care Reviewed With: patient           Outcome Evaluation: No acute changes overnight. Resting well overnight. Plans for HD today. AM labs pending.

## 2023-12-08 LAB
FERRITIN SERPL-MCNC: 432 NG/ML (ref 30–400)
GLUCOSE BLDC GLUCOMTR-MCNC: 127 MG/DL (ref 70–130)
GLUCOSE BLDC GLUCOMTR-MCNC: 130 MG/DL (ref 70–130)
GLUCOSE BLDC GLUCOMTR-MCNC: 149 MG/DL (ref 70–130)
GLUCOSE BLDC GLUCOMTR-MCNC: 169 MG/DL (ref 70–130)
HCT VFR BLD AUTO: 26.8 % (ref 37.5–51)
HGB BLD-MCNC: 8.3 G/DL (ref 13–17.7)
IRON 24H UR-MRATE: 19 MCG/DL (ref 59–158)
IRON SATN MFR SERPL: 8 % (ref 20–50)
TIBC SERPL-MCNC: 226 MCG/DL (ref 298–536)
TRANSFERRIN SERPL-MCNC: 152 MG/DL (ref 200–360)

## 2023-12-08 PROCEDURE — 94799 UNLISTED PULMONARY SVC/PX: CPT

## 2023-12-08 PROCEDURE — 85018 HEMOGLOBIN: CPT | Performed by: STUDENT IN AN ORGANIZED HEALTH CARE EDUCATION/TRAINING PROGRAM

## 2023-12-08 PROCEDURE — 82728 ASSAY OF FERRITIN: CPT | Performed by: STUDENT IN AN ORGANIZED HEALTH CARE EDUCATION/TRAINING PROGRAM

## 2023-12-08 PROCEDURE — 82948 REAGENT STRIP/BLOOD GLUCOSE: CPT

## 2023-12-08 PROCEDURE — 83540 ASSAY OF IRON: CPT | Performed by: STUDENT IN AN ORGANIZED HEALTH CARE EDUCATION/TRAINING PROGRAM

## 2023-12-08 PROCEDURE — 63710000001 INSULIN REGULAR HUMAN PER 5 UNITS: Performed by: INTERNAL MEDICINE

## 2023-12-08 PROCEDURE — 84466 ASSAY OF TRANSFERRIN: CPT | Performed by: STUDENT IN AN ORGANIZED HEALTH CARE EDUCATION/TRAINING PROGRAM

## 2023-12-08 PROCEDURE — 94664 DEMO&/EVAL PT USE INHALER: CPT

## 2023-12-08 PROCEDURE — 63710000001 INSULIN GLARGINE PER 5 UNITS: Performed by: INTERNAL MEDICINE

## 2023-12-08 PROCEDURE — 97530 THERAPEUTIC ACTIVITIES: CPT

## 2023-12-08 PROCEDURE — 94761 N-INVAS EAR/PLS OXIMETRY MLT: CPT

## 2023-12-08 PROCEDURE — 85014 HEMATOCRIT: CPT | Performed by: STUDENT IN AN ORGANIZED HEALTH CARE EDUCATION/TRAINING PROGRAM

## 2023-12-08 RX ORDER — MANNITOL 250 MG/ML
12.5 INJECTION, SOLUTION INTRAVENOUS AS NEEDED
Status: CANCELLED | OUTPATIENT
Start: 2023-12-09 | End: 2023-12-09

## 2023-12-08 RX ADMIN — INSULIN HUMAN 2 UNITS: 100 INJECTION, SOLUTION PARENTERAL at 18:18

## 2023-12-08 RX ADMIN — PANTOPRAZOLE SODIUM 40 MG: 40 TABLET, DELAYED RELEASE ORAL at 08:45

## 2023-12-08 RX ADMIN — Medication 10 ML: at 08:45

## 2023-12-08 RX ADMIN — SODIUM BICARBONATE 650 MG: 650 TABLET, ORALLY DISINTEGRATING ORAL at 20:46

## 2023-12-08 RX ADMIN — APIXABAN 2.5 MG: 2.5 TABLET, FILM COATED ORAL at 20:46

## 2023-12-08 RX ADMIN — APIXABAN 2.5 MG: 2.5 TABLET, FILM COATED ORAL at 08:45

## 2023-12-08 RX ADMIN — BUDESONIDE AND FORMOTEROL FUMARATE DIHYDRATE 2 PUFF: 160; 4.5 AEROSOL RESPIRATORY (INHALATION) at 20:21

## 2023-12-08 RX ADMIN — CARVEDILOL 6.25 MG: 6.25 TABLET, FILM COATED ORAL at 08:44

## 2023-12-08 RX ADMIN — SODIUM BICARBONATE 650 MG: 650 TABLET, ORALLY DISINTEGRATING ORAL at 18:19

## 2023-12-08 RX ADMIN — BUDESONIDE AND FORMOTEROL FUMARATE DIHYDRATE 2 PUFF: 160; 4.5 AEROSOL RESPIRATORY (INHALATION) at 06:34

## 2023-12-08 RX ADMIN — ROSUVASTATIN CALCIUM 10 MG: 10 TABLET, FILM COATED ORAL at 08:45

## 2023-12-08 RX ADMIN — SODIUM BICARBONATE 650 MG: 650 TABLET, ORALLY DISINTEGRATING ORAL at 08:45

## 2023-12-08 RX ADMIN — INSULIN HUMAN 2 UNITS: 100 INJECTION, SOLUTION PARENTERAL at 12:03

## 2023-12-08 RX ADMIN — LEVOTHYROXINE SODIUM 75 MCG: 75 TABLET ORAL at 06:14

## 2023-12-08 RX ADMIN — Medication 10 ML: at 21:57

## 2023-12-08 RX ADMIN — CETIRIZINE HYDROCHLORIDE 10 MG: 10 TABLET ORAL at 08:45

## 2023-12-08 RX ADMIN — INSULIN GLARGINE 2 UNITS: 100 INJECTION, SOLUTION SUBCUTANEOUS at 21:56

## 2023-12-08 RX ADMIN — CARVEDILOL 6.25 MG: 6.25 TABLET, FILM COATED ORAL at 18:19

## 2023-12-08 RX ADMIN — Medication 10 ML: at 20:46

## 2023-12-08 RX ADMIN — PANTOPRAZOLE SODIUM 40 MG: 40 TABLET, DELAYED RELEASE ORAL at 18:19

## 2023-12-08 NOTE — PLAN OF CARE
Goal Outcome Evaluation:  Plan of Care Reviewed With: patient        Progress: no change  Outcome Evaluation: discharge pending precert. Flat affect noted. Denies needs. VSS

## 2023-12-08 NOTE — PROGRESS NOTES
Assessment Date:  12/08/23    Summary: Patient assessed r/t LOS     Visited patient this afternoon who reports a generally good appetite. Observed 100% of lunch tray consumed at bedside. At baseline patient states eating about 2 meals/day. He reports about ~40# weight loss this year, without really knowing why. This is consistent with EMR data. May be a result of hypermetabolism associated with his many chronic disease states. He is willing to try oral nutritional supplements to promote calorie/protein intakes.     NFPE completed, consistent with nutrition diagnosis of severe malnutrition using AND/ASPEN criteria. See MSA below.     Recommend:   NovasRoomixerce Renal BID (Provides 950 kcals, 43 g protein if consumed)       CLINICAL NUTRITION ASSESSMENT      Reason for Assessment Length of Stay     Diagnosis/Problem   GIB  -EGD 12/04/2023 which showed hiatal hernia and LA grade B esophagitis.  Biopsies consistent with chronic inflammation     Afib w/ RVR  ME-resolved   ESRD on HD  CHF   COPD      Medical/Surgical History Past Medical History:   Diagnosis Date    Acquired hypothyroidism     Acute congestive heart failure     Acute respiratory failure with hypoxia     Acute sinusitis     SYLVAIN (acute kidney injury)     on CKD    Anemia     Arthritis     CAD (coronary artery disease)     Cancer     skin    Chronic combined systolic and diastolic congestive heart failure     Chronic ischemic heart disease     Chronic kidney disease (CKD)     CKD (chronic kidney disease)     COPD (chronic obstructive pulmonary disease)     Depression     Diabetes mellitus type 2, uncontrolled, without complications     Diabetic neuropathy     Disease of thyroid gland     Elevated cholesterol     Fatigue     Frequent PVCs     Generalized weakness     GERD (gastroesophageal reflux disease)     Heart attack     History of coronary artery disease     with remote history of bypass surgery in 2001    Hyperlipidemia     Hypertension     Hypertensive  encephalopathy     Mental status change     Acute    Nausea & vomiting 12/01/2023    NO DEVICE/RECALLED     Pleural effusion     Pneumonia     Poor historian     RBBB (right bundle branch block)     Stroke     POOR VISION    TIA (transient ischemic attack)     Type 2 diabetes mellitus     Urinary retention     Vitamin D deficiency        Past Surgical History:   Procedure Laterality Date    APPENDECTOMY N/A 02/14/2016    Dr. Alexey Dodson    ARTERIOVENOUS FISTULA/SHUNT SURGERY Right 06/03/2022    Procedure: RIGHT FOREARM ARTERIOVENOUS FISTULA PLACEMENT RADIOCEPHALIC WITH CEPHALIC VEIN TRANSPOSITION;  Surgeon: Eliseo Willis MD;  Location: [facility] MAIN OR;  Service: Vascular;  Laterality: Right;    COLONOSCOPY      over 20 years ago.  no polyps     COLONOSCOPY N/A 09/18/2018    Procedure: COLONOSCOPY;  Surgeon: Jose Enrique Louie MD;  Location: [facility] ENDOSCOPY;  Service: Gastroenterology    COLONOSCOPY N/A 09/19/2018    IH, EH, polyps (TAs w/low grade dysplasia)    COLONOSCOPY N/A 06/01/2020    Procedure: COLONOSCOPY;  Surgeon: Jose Enrique Louie MD;  Location: [facility] ENDOSCOPY;  Service: Gastroenterology;  Laterality: N/A;  Pre op-Anemia, Melena, History of Polyps  Post op-To Cecum/TI, Polyp, Poor Prep    CORONARY ARTERY BYPASS GRAFT  11/2001    ENDOSCOPY N/A 05/29/2020    Procedure: ESOPHAGOGASTRODUODENOSCOPY with biopsies;  Surgeon: Amanda Lovelace MD;  Location: [facility] ENDOSCOPY;  Service: Gastroenterology;  Laterality: N/A;  pre-anemia, dark stools  post-esophagitis, hiatal hernia    ENDOSCOPY N/A 09/15/2020    Procedure: ESOPHAGOGASTRODUODENOSCOPY WITH BIOPSIES;  Surgeon: Jose Enrique Louie MD;  Location: [facility] ENDOSCOPY;  Service: Gastroenterology;  Laterality: N/A;  pre: history of melena and esophagitis  post: mild esophagitis and gastritis, small hiatal hernia    ENDOSCOPY N/A 12/4/2023    Procedure: ESOPHAGOGASTRODUODENOSCOPY with bx;  Surgeon: Quoc Elmore MD;  Location: [facility]  "ENDOSCOPY;  Service: Gastroenterology;  Laterality: N/A;  pre: Coffee-ground emesis  post: hiatal hernia, esophagitis    HERNIA REPAIR Left 12/05/2019    THORACENTESIS      TONSILLECTOMY      VASECTOMY          Anthropometrics        Current Height  Current Weight  BMI kg/m2 Height: 167.6 cm (66\")  Weight: 65.8 kg (145 lb 1 oz) (12/08/23 0543)  Body mass index is 23.41 kg/m².   Adjusted BMI (if applicable)    BMI Category Normal/Healthy (18.4 - 24.9)   Ideal Body Weight (IBW) 142 lbs    Usual Body Weight (UBW) 160-180 lbs    Weight Trend Loss, Amount/Timeframe: Noted 19.1% weight loss since March of 2023.    Weight History Wt Readings from Last 30 Encounters:   12/08/23 0543 65.8 kg (145 lb 1 oz)   12/07/23 0508 66.2 kg (145 lb 15.1 oz)   12/06/23 0507 68.4 kg (150 lb 12.7 oz)   12/05/23 0716 65.7 kg (144 lb 14.4 oz)   12/05/23 0600 66.6 kg (146 lb 13.2 oz)   12/04/23 0526 69.1 kg (152 lb 5.4 oz)   12/02/23 0536 67.2 kg (148 lb 2.4 oz)   12/01/23 0627 64.5 kg (142 lb 3.2 oz)   11/30/23 0545 67 kg (147 lb 11.3 oz)   11/29/23 0340 61.3 kg (135 lb 2.3 oz)   11/23/23 1441 63.1 kg (139 lb 1.6 oz)   06/22/23 1053 66.2 kg (146 lb)   05/30/23 1339 66.2 kg (146 lb)   05/05/23 0505 78.6 kg (173 lb 3.2 oz)   05/04/23 0613 78.4 kg (172 lb 14.4 oz)   05/03/23 0553 77.7 kg (171 lb 4.8 oz)   05/02/23 1149 77.8 kg (171 lb 8.3 oz)   05/02/23 0602 81.6 kg (180 lb)   04/14/23 1307 81.6 kg (180 lb)   03/28/23 1148 73.9 kg (163 lb)   03/10/23 0600 71.6 kg (157 lb 12.8 oz)   03/09/23 0550 70 kg (154 lb 4.8 oz)   03/08/23 0954 73 kg (161 lb)   03/08/23 0552 73.3 kg (161 lb 9.6 oz)   03/07/23 1302 74.6 kg (164 lb 7.4 oz)   03/06/23 2020 81.6 kg (180 lb)   10/05/22 1412 71.2 kg (157 lb)   09/18/22 1420 74.4 kg (164 lb)   09/16/22 0500 74.6 kg (164 lb 7.4 oz)   09/16/22 0458 74.6 kg (164 lb 7.4 oz)   07/29/22 0615 74.6 kg (164 lb 8 oz)   07/28/22 0630 75 kg (165 lb 4.8 oz)   07/27/22 0701 76.9 kg (169 lb 8 oz)   07/27/22 0452 77.4 kg (170 lb " 11.2 oz)   07/26/22 0809 79.5 kg (175 lb 3.2 oz)   07/26/22 0603 81.5 kg (179 lb 9.6 oz)   07/25/22 0553 83.4 kg (183 lb 14.4 oz)   07/24/22 2007 84.1 kg (185 lb 8 oz)   06/29/22 1422 84.4 kg (186 lb)   06/15/22 1357 80.7 kg (178 lb)   06/10/22 1542 79.3 kg (174 lb 12.8 oz)   06/08/22 1355 77.6 kg (171 lb)   06/04/22 1451 78.8 kg (173 lb 11.6 oz)   06/03/22 0852 80.9 kg (178 lb 6.4 oz)   06/01/22 1934 81.6 kg (180 lb)   05/20/22 0633 79.7 kg (175 lb 11.3 oz)   05/18/22 1342 79.1 kg (174 lb 6.4 oz)   03/08/22 0505 78.2 kg (172 lb 4.8 oz)   03/07/22 0554 78.9 kg (173 lb 14.4 oz)   03/06/22 0228 78.8 kg (173 lb 11.2 oz)   03/05/22 0535 78.3 kg (172 lb 9.6 oz)   03/04/22 0530 75.8 kg (167 lb 1.6 oz)   03/03/22 0602 76 kg (167 lb 8.8 oz)   03/02/22 0512 74.9 kg (165 lb 2 oz)   03/01/22 0647 74.8 kg (165 lb)   02/08/22 1307 77.6 kg (171 lb)   02/03/22 1333 81.6 kg (180 lb)   02/02/22 1949 81.6 kg (180 lb)   02/01/22 1917 81.6 kg (180 lb)   12/28/21 1120 83 kg (183 lb)   12/22/21 0510 85.4 kg (188 lb 3.2 oz)   12/21/21 0555 85.5 kg (188 lb 6.4 oz)   12/20/21 0519 84.4 kg (186 lb)   12/19/21 0519 84.4 kg (186 lb 1.6 oz)   12/18/21 0522 85.1 kg (187 lb 9.6 oz)   12/17/21 0422 85.7 kg (189 lb)   12/16/21 0348 84.6 kg (186 lb 8 oz)   12/15/21 0525 85.3 kg (188 lb 1.6 oz)   12/13/21 2116 86.2 kg (190 lb)   09/29/21 0913 78 kg (172 lb)   09/01/21 1414 80.3 kg (177 lb)   07/27/21 1537 77.6 kg (171 lb 1.6 oz)   06/02/21 1111 73.7 kg (162 lb 6.4 oz)   05/22/21 0300 84.4 kg (186 lb)   05/21/21 1439 82.6 kg (182 lb)   05/21/21 0050 82.8 kg (182 lb 9.6 oz)      --  Labs       Pertinent Labs    Results from last 7 days   Lab Units 12/07/23  0357 12/06/23  0349 12/05/23  0318   SODIUM mmol/L 137 138 138   POTASSIUM mmol/L 4.5 4.5 5.0   CHLORIDE mmol/L 104 105 106   CO2 mmol/L 24.6 25.9 27.0   BUN mg/dL 29* 20 39*   CREATININE mg/dL 3.07* 2.22* 3.22*   CALCIUM mg/dL 8.4* 8.1* 8.5*   GLUCOSE mg/dL 88 116* 87     Results from last 7 days    Lab Units 12/08/23  0316 12/07/23  0357 12/05/23 0318 12/04/23  0259   MAGNESIUM mg/dL  --   --   --  1.8   PHOSPHORUS mg/dL  --  3.1   < > 2.2*   HEMOGLOBIN g/dL 8.3* 8.1*   < > 8.0*   HEMATOCRIT % 26.8* 26.4*   < > 26.2*   WBC 10*3/mm3  --  8.91   < > 9.56   ALBUMIN g/dL  --  2.9*   < > 2.8*    < > = values in this interval not displayed.     Results from last 7 days   Lab Units 12/07/23  0357 12/06/23  0349 12/05/23 0318 12/04/23 0259 12/03/23  0749   PLATELETS 10*3/mm3 296 257 262 249 257     COVID19   Date Value Ref Range Status   05/02/2023 Not Detected Not Detected - Ref. Range Final     Lab Results   Component Value Date    HGBA1C 6.90 (H) 12/02/2023          Medications           Scheduled Medications apixaban, 2.5 mg, Oral, Q12H  budesonide-formoterol, 2 puff, Inhalation, BID - RT  carvedilol, 6.25 mg, Oral, BID With Meals  cetirizine, 10 mg, Oral, Daily  insulin glargine, 2 Units, Subcutaneous, Nightly  insulin regular, 2-7 Units, Subcutaneous, 4x Daily AC & at Bedtime  levothyroxine, 75 mcg, Oral, Q AM  pantoprazole, 40 mg, Oral, BID AC  rosuvastatin, 10 mg, Oral, Daily  sodium bicarbonate, 650 mg, Oral, TID  sodium chloride, 500 mL, Intravenous, Once  sodium chloride, 10 mL, Intravenous, Q12H  sodium chloride, 10 mL, Intravenous, Q12H       Infusions     PRN Medications   acetaminophen **OR** acetaminophen **OR** acetaminophen    acetaminophen **OR** acetaminophen    calcium carbonate    dextrose    dextrose    dextrose    glucagon (human recombinant)    ipratropium-albuterol    nitroglycerin    ondansetron **OR** ondansetron    phenol    sodium chloride    Insert Peripheral IV **AND** sodium chloride    sodium chloride    sodium chloride    sodium chloride    sodium chloride    sodium chloride     Physical Findings          General Findings loss of muscle mass, loss of subcutaneous fat   Oral/Mouth Cavity tooth or teeth missing   Edema  no edema   Gastrointestinal last bowel movement: 12/4   Skin   skin intact   Tubes/Drains/Lines none   NFPE See Malnutrition Severity Assessment     Malnutrition Severity Assessment      Patient meets criteria for : Severe Malnutrition  Malnutrition Type (last 8 hours)       Malnutrition Severity Assessment       Row Name 12/08/23 1420       Malnutrition Severity Assessment    Malnutrition Type Chronic Disease - Related Malnutrition      Row Name 12/08/23 1420       Muscle Loss    Loss of Muscle Mass Findings Severe    Worship Region Severe - deep hollowing/scooping, lack of muscle to touch, facial bones well defined    Clavicle Bone Region Severe - protruding prominent bone    Acromion Bone Region Severe - squared shoulders, bones, and acromion process protrusion prominent    Scapular Bone Region Severe - prominent bones, depressions easily visible between ribs, scapula, spine, shoulders    Dorsal Hand Region Severe - prominent depression    Patellar Region Severe - prominent bone, square looking, very little muscle definition    Anterior Thigh Region Severe - line/depression along thigh, obviously thin    Posterior Calf Region Severe - thin with very little definition/firmness      Row Name 12/08/23 1420       Fat Loss    Subcutaneous Fat Loss Findings Severe    Orbital Region  Severe - pronounced hollowness/depression, dark circles, loose saggy skin    Upper Arm Region Severe - mostly skin, very little space between folds, fingers touch    Thoracic & Lumbar Region None      Row Name 12/08/23 1420       Criteria Met (Must meet criteria for severity in at least 2 of these categories: M Wasting, Fat Loss, Fluid, Secondary Signs, Wt. Status, Intake)    Patient meets criteria for  Severe Malnutrition                       Current Nutrition Orders & Evaluation of Intake       Oral Nutrition     Food Allergies NKFA   Current PO Diet Diet: Diabetic Diets; Consistent Carbohydrate; Texture: Regular Texture (IDDSI 7); Fluid Consistency: Thin (IDDSI 0)   Supplement None    PO Evaluation      % PO Intake % of meals     Factors Affecting Intake: No factors at this time   --  PES STATEMENT / NUTRITION DIAGNOSIS      Nutrition Dx Problem  Problem: Severe chronic disease related malnutrition   Etiology: Hypermetabolism associated with disease states (ESRD, CHF, COPD)   Signs/Symptoms: Muscle/fat wasting as noted on physical exam.      NUTRITION INTERVENTION / PLAN OF CARE      Intervention Goal(s) Maintain intake and Accepts oral nutrition supplement         RD Intervention/Action Supplement provided, Encourage intake, and Continue to monitor   --      Prescription/Orders:       PO Diet Regular CCHO with thin liquids      Supplements Novasource Renal BID (Provides 950 kcals, 43 g protein if consumed)       Enteral Nutrition       Parenteral Nutrition    New Prescription Ordered? Yes   --      Monitor/Evaluation I&O, PO intake, Supplement intake, Pertinent labs, Weight, Skin status, GI status, Symptoms   Discharge Plan/Needs Pending clinical course   --    RD to follow per protocol.      Electronically signed by:  Columba Chandra RD  12/08/23 13:01 EST

## 2023-12-08 NOTE — PROGRESS NOTES
[facility] Progress Note      Patient Name: Carlito Mo  : 1955  MRN: [id number]  Primary Care Physician:  Florencia Caballero MD  Date of admission: 2023    Subjective     Interval History:   Follow-up ESRD.      Patient is feeling the same, denies any chest pain or shortness of air, no nausea or vomiting, no abdominal pain, had dialysis yesterday without any difficulties.      Review of Systems:   As noted above    Objective     Vitals:   Temp:  [97.7 °F (36.5 °C)-98.4 °F (36.9 °C)] 97.7 °F (36.5 °C)  Heart Rate:  [68-81] 72  Resp:  [18] 18  BP: (113-134)/(52-62) 122/52    Intake/Output Summary (Last 24 hours) at 2023 1028  Last data filed at 2023 0543  Gross per 24 hour   Intake --   Output 2100 ml   Net -2100 ml       Physical Exam:    General Appearance:, Alert, chronically ill, frail, no acute distress.   Skin: warm and dry  HEENT: Mucosa dry nonicteric sclera  Neck: supple, no JVD  Lungs clear to auscultation bilaterally, unlabored breathing effort  Heart: Irregular irregular, no rub  Abdomen: soft, nontender, nondistended.+bs   Extremities: no edema.  Right upper extremity AV fistula.      Scheduled Meds:     apixaban, 2.5 mg, Oral, Q12H  budesonide-formoterol, 2 puff, Inhalation, BID - RT  carvedilol, 6.25 mg, Oral, BID With Meals  cetirizine, 10 mg, Oral, Daily  insulin glargine, 2 Units, Subcutaneous, Nightly  insulin regular, 2-7 Units, Subcutaneous, 4x Daily AC & at Bedtime  levothyroxine, 75 mcg, Oral, Q AM  pantoprazole, 40 mg, Oral, BID AC  rosuvastatin, 10 mg, Oral, Daily  sodium bicarbonate, 650 mg, Oral, TID  sodium chloride, 500 mL, Intravenous, Once  sodium chloride, 10 mL, Intravenous, Q12H  sodium chloride, 10 mL, Intravenous, Q12H      IV Meds:          Results Reviewed:   I have personally reviewed the results from the time of this admission to 2023 10:28 EST     Results from last 7 days   Lab Units 23  0357 23  0349  12/05/23 0318   SODIUM mmol/L 137 138 138   POTASSIUM mmol/L 4.5 4.5 5.0   CHLORIDE mmol/L 104 105 106   CO2 mmol/L 24.6 25.9 27.0   BUN mg/dL 29* 20 39*   CREATININE mg/dL 3.07* 2.22* 3.22*   CALCIUM mg/dL 8.4* 8.1* 8.5*   GLUCOSE mg/dL 88 116* 87       Estimated Creatinine Clearance: 21.4 mL/min (A) (by C-G formula based on SCr of 3.07 mg/dL (H)).    Results from last 7 days   Lab Units 12/07/23 0357 12/06/23 0349 12/05/23 0318 12/04/23 0259   MAGNESIUM mg/dL  --   --   --  1.8   PHOSPHORUS mg/dL 3.1 2.9 2.9 2.2*             Results from last 7 days   Lab Units 12/08/23 0316 12/07/23 0357 12/06/23 0349 12/05/23 0318 12/04/23 0259 12/03/23  0749   WBC 10*3/mm3  --  8.91 5.78 8.80 9.56 11.80*   HEMOGLOBIN g/dL 8.3* 8.1* 7.8* 7.8* 8.0* 8.6*   PLATELETS 10*3/mm3  --  296 257 262 249 257               Assessment / Plan     ASSESSMENT:  1..  ESRD.  Dialysis today.  Outpatient dialysis being arranged with rehab.  If he will be on dialysis at rehab Monday Wednesday Friday, he had dialysis yesterday waiting for placement at [facility]   2. Paroxysmal A-fib with rapid ventricular response.  On Coreg and Eliquis (lower dose).  3.  GI bleed with coffee ground emesis.  Esophagitis on EGD.  Chronic inflammation.  On pantoprazole.  Hemoglobin is 8.3, stable  4.  Type II NSTEMI.  5.  Chronic combined systolic and diastolic heart failure.  Removing volume with dialysis as blood pressure allows..  6.  Anemia of chronic kidney disease.  On HAI as outpatient  7.  Hypothyroidism on replacement.  8.  History of embolic CVA  9. DM2 with renal complication    PLAN:  Hemodialysis tomorrow unless he is discharged  Surveillance labs  He is cleared for transferred to skilled nursing facility and will follow him facility    Reviewed the chart and other providers notes, check labs  Discussed the case with the patient  Copied text in this note has been reviewed and is accurate as of 12/08/23.       Thank you for involving us  in the care of Carlito Mo.  Please feel free to call with any questions.    Cameron Olguin MD  12/08/23  10:28 EST    [facility]  [phone]    Please note that portions of this note were completed with a voice recognition program.

## 2023-12-08 NOTE — PROGRESS NOTES
Name: Carlito Mo ADMIT: 2023   : 1955  PCP: Florencia Caballero MD    MRN: [id number] LOS: 9 days   AGE/SEX: 68 y.o. male  ROOM: [facility]     Subjective   Subjective   No changes, no new complaints.  Laying in bed.  No further signs of bleeding.  Hemoglobin stable.    Review of Systems   As above  Objective   Objective   Vital Signs  Temp:  [97.2 °F (36.2 °C)-98.4 °F (36.9 °C)] 97.2 °F (36.2 °C)  Heart Rate:  [68-80] 70  Resp:  [18] 18  BP: (113-141)/(52-62) 141/62  SpO2:  [95 %-100 %] 100 %  on   ;   Device (Oxygen Therapy): room air  Body mass index is 23.41 kg/m².  Physical Exam    General: Alert,  oriented x 3, laying in bed,  HEENT: Normocephalic, atraumatic  CV: Irregular rhythm, normal rate  Lungs: Clear to auscultation bilaterally, no wheezing  Abdomen: Soft, nontender, nondistended  Extremities: No significant peripheral edema , no cyanosis     Results Review     I reviewed the patient's new clinical results.  Results from last 7 days   Lab Units 23  025   WBC 10*3/mm3  --  8.91 5.78 8.80 9.56   HEMOGLOBIN g/dL 8.3* 8.1* 7.8* 7.8* 8.0*   PLATELETS 10*3/mm3  --  296 257 262 249     Results from last 7 days   Lab Units 23  03523  025   SODIUM mmol/L 137 138 138 138   POTASSIUM mmol/L 4.5 4.5 5.0 4.8   CHLORIDE mmol/L 104 105 106 105   CO2 mmol/L 24.6 25.9 27.0 27.3   BUN mg/dL 29* 20 39* 32*   CREATININE mg/dL 3.07* 2.22* 3.22* 2.96*   GLUCOSE mg/dL 88 116* 87 75   Estimated Creatinine Clearance: 21.4 mL/min (A) (by C-G formula based on SCr of 3.07 mg/dL (H)).  Results from last 7 days   Lab Units 23  0357 23  0349 23  0318 23  0259   ALBUMIN g/dL 2.9* 2.5* 2.8* 2.8*     Results from last 7 days   Lab Units 23  0357 23  0349 23  0318 23  0259   CALCIUM mg/dL 8.4* 8.1* 8.5* 8.2*   ALBUMIN g/dL 2.9* 2.5* 2.8* 2.8*   MAGNESIUM mg/dL  --    --   --  1.8   PHOSPHORUS mg/dL 3.1 2.9 2.9 2.2*           COVID19   Date Value Ref Range Status   05/02/2023 Not Detected Not Detected - Ref. Range Final   03/07/2023 Not Detected Not Detected - Ref. Range Final     Glucose   Date/Time Value Ref Range Status   12/08/2023 1143 169 (H) 70 - 130 mg/dL Final   12/08/2023 0611 130 70 - 130 mg/dL Final   12/07/2023 2046 101 70 - 130 mg/dL Final   12/07/2023 1612 207 (H) 70 - 130 mg/dL Final   12/07/2023 0633 92 70 - 130 mg/dL Final   12/06/2023 2034 102 70 - 130 mg/dL Final   12/06/2023 1629 188 (H) 70 - 130 mg/dL Final           XR Chest 1 View  Narrative: SINGLE VIEW OF THE CHEST     HISTORY: Shortness of breath     COMPARISON: November 2030 is 23     FINDINGS:  The patient status post median sternotomy with coronary artery bypass  grafting. There is cardiomegaly. There is calcification of the aorta. No  pneumothorax or pleural effusion is seen. No definite acute infiltrates  are identified.     Impression: No acute findings.     This report was finalized on 11/28/2023 9:51 PM by Dr. Alice Allen M.D on Workstation: [id number]       Scheduled Medications  apixaban, 2.5 mg, Oral, Q12H  budesonide-formoterol, 2 puff, Inhalation, BID - RT  carvedilol, 6.25 mg, Oral, BID With Meals  cetirizine, 10 mg, Oral, Daily  insulin glargine, 2 Units, Subcutaneous, Nightly  insulin regular, 2-7 Units, Subcutaneous, 4x Daily AC & at Bedtime  levothyroxine, 75 mcg, Oral, Q AM  pantoprazole, 40 mg, Oral, BID AC  rosuvastatin, 10 mg, Oral, Daily  sodium bicarbonate, 650 mg, Oral, TID  sodium chloride, 500 mL, Intravenous, Once  sodium chloride, 10 mL, Intravenous, Q12H  sodium chloride, 10 mL, Intravenous, Q12H    Infusions     Diet  Diet: Diabetic Diets; Consistent Carbohydrate; Texture: Regular Texture (IDDSI 7); Fluid Consistency: Thin (IDDSI 0)    I have personally reviewed     [x]  Laboratory   []  Microbiology   []  Radiology   []  EKG/Telemetry  []  Cardiology/Vascular    []  Pathology    []  Records       Assessment/Plan     Active Hospital Problems    Diagnosis  POA    **Atrial fibrillation with rapid ventricular response [I48.91]  Yes    ESRD (end stage renal disease) [N18.6]  Yes    Nausea & vomiting [R11.2]  Yes    Hemoptysis [R04.2]  Yes    Coffee ground emesis [K92.0]  Unknown    Embolic stroke [I63.9]  Yes    Coronary artery disease involving native coronary artery of native heart without angina pectoris [I25.10]  Yes    Chronic combined systolic and diastolic CHF (congestive heart failure) [I50.42]  Yes    Acquired hypothyroidism [E03.9]  Yes    Benign prostatic hyperplasia with nocturia [N40.1, R35.1]  Yes    Type 2 diabetes mellitus, with long-term current use of insulin [E11.9, Z79.4]  Not Applicable      Resolved Hospital Problems   No resolved problems to display.       68 y.o. male admitted with Atrial fibrillation with rapid ventricular response.    GI bleeding/coffee ground emesis/diarrhea-  -possible viral gastroenteritis  GI evaluated, status post EGD 12/04/2023 which showed hiatal hernia and LA grade B esophagitis.  Biopsies consistent with chronic inflammation without evidence of intestinal metaplasia or H. pylori   -Status post IV PPI continue p.o. pantoprazole 40 mg twice daily  -Eliquis resumed at lower dose 2.5 mg twice daily 12/04.  Cardiology recommended to continue low-dose follow-up couple weeks, and transition outpatient to 5 mg twice daily if hemoglobin remains stable and no further episodes of bleeding.  -Hemoglobin stable          Afib with rvr-s/p diltiazem drip. On low dose coreg, eliquis as above.  Cardiolog evaluated..  Will need follow-up outpatient in 2 weeks.    Elevated troponin due to ESRD and type 2 NSTEMI from rvr-no plans for ischemic workup at this time.    Metabolic encephalopathy-resolved    Coronary artery disease s/p CABG in 2001-statin/bb. Asa on hold,  on Eliquis.    Chronic combined systolic and diastolic heart failure-bb.  Torsemide is held. euvolemic    ESRD on HD T, TH, Sa-nephrology is managing dialysis    Anemia of chronic disease-on HAI as an outpatient    Type 2 diabetes-A1c 6.9. He is on a very low dose of glargine as an outpatient. Continue sliding scale.    COPD with chronic respiratory failure with hypoxia on home O2-symbicort    Hypothyroidism-synthroid    History of embolic CVA-Eliquis 2.5 p.o. twice daily    Nasal congestion-zyrtec and saline nasal spray    DVT prophylax: Eliquis  Full code.  Discussed with patient.  Discussed with CCP  Anticipate discharge to SNU, pending pre-CERT, medically stable to be discharged.      Copied text in this note has been reviewed and is accurate as of 12/08/23.         Dictated utilizing Dragon dictation        Aria Red MD  [facility]  12/08/23  14:27 EST

## 2023-12-08 NOTE — THERAPY TREATMENT NOTE
Patient Name: Carlito Mo  : 1955    MRN: [id number]                              Today's Date: 2023       Admit Date: 2023    Visit Dx:     ICD-10-CM ICD-9-CM   1. Atrial fibrillation with rapid ventricular response  I48.91 427.31   2. Nausea and vomiting, unspecified vomiting type  R11.2 787.01   3. End stage renal disease on dialysis  N18.6 585.6    Z99.2 V45.11   4. Coagulopathy: Eliquis induced  D68.9 286.9   5. Upper GI bleed  K92.2 578.9   6. Coffee ground emesis  K92.0 578.0     Patient Active Problem List   Diagnosis    Major depressive disorder with single episode, in partial remission    Other hyperlipidemia    Type 2 diabetes mellitus, with long-term current use of insulin    Vitamin D deficiency    Erectile dysfunction    Chronic fatigue    Type 2 diabetes mellitus with ophthalmic complication    Skin lesion of chest wall    Slow transit constipation    Benign prostatic hyperplasia with nocturia    History of basal cell carcinoma    High blood pressure associated with diabetes    Acquired hypothyroidism    Hypertensive urgency    Recurrent strokes    Noncompliance w/medication treatment due to intermit use of medication    Urinary retention    Pneumonia    Chronic combined systolic and diastolic CHF (congestive heart failure)    Acute respiratory failure with hypoxia    Suspected sleep apnea    Pleural effusion    YAW (obstructive sleep apnea)    Coronary artery disease involving native coronary artery of native heart without angina pectoris    Chronically elevated troponin    Colon cancer screening    Enlarged lymph nodes    Functional gait abnormality    History of myocardial infarction    Hospital discharge follow-up    Insomnia    Iron deficiency anemia    Major depression, recurrent    Anemia, chronic renal failure, stage 3 (moderate)    Essential hypertension    Adverse effect of iron and its compounds, initial encounter    Acute on chronic renal insufficiency    Debility     Falls frequently    Acute pain of right shoulder    Acute on chronic anemia    Heme positive stool    Neurogenic orthostatic hypotension    Anemia, chronic disease    History of colon polyps    Helicobacter pylori gastritis    Esophagitis    Sleep related hypoxia    Embolic stroke    Patent foramen ovale    Pleural effusion, bilateral    Cardiomyopathy    Lower abdominal pain    Diarrhea    CKD (chronic kidney disease) stage 4, GFR 15-29 ml/min    Hypertension not at goal    Memory loss due to medical condition    Generalized weakness    ERRONEOUS ENCOUNTER--DISREGARD    Weakness    Paroxysmal atrial fibrillation    Chronic anticoagulation    Multiple falls    Dehydration    SYLVAIN (acute kidney injury)    Acute ischemic stroke    Acute combined systolic and diastolic congestive heart failure    History of stroke    Iron deficiency anemia    Acute HFrEF (heart failure with reduced ejection fraction)    Atrial fibrillation with rapid ventricular response    ESRD (end stage renal disease)    Nausea & vomiting    Hemoptysis    Coffee ground emesis     Past Medical History:   Diagnosis Date    Acquired hypothyroidism     Acute congestive heart failure     Acute respiratory failure with hypoxia     Acute sinusitis     SYLVAIN (acute kidney injury)     on CKD    Anemia     Arthritis     CAD (coronary artery disease)     Cancer     skin    Chronic combined systolic and diastolic congestive heart failure     Chronic ischemic heart disease     Chronic kidney disease (CKD)     CKD (chronic kidney disease)     COPD (chronic obstructive pulmonary disease)     Depression     Diabetes mellitus type 2, uncontrolled, without complications     Diabetic neuropathy     Disease of thyroid gland     Elevated cholesterol     Fatigue     Frequent PVCs     Generalized weakness     GERD (gastroesophageal reflux disease)     Heart attack     History of coronary artery disease     with remote history of bypass surgery in 2001    Hyperlipidemia      Hypertension     Hypertensive encephalopathy     Mental status change     Acute    Nausea & vomiting 12/01/2023    NO DEVICE/RECALLED     Pleural effusion     Pneumonia     Poor historian     RBBB (right bundle branch block)     Stroke     POOR VISION    TIA (transient ischemic attack)     Type 2 diabetes mellitus     Urinary retention     Vitamin D deficiency      Past Surgical History:   Procedure Laterality Date    APPENDECTOMY N/A 02/14/2016    Dr. Alexey Dodson    ARTERIOVENOUS FISTULA/SHUNT SURGERY Right 06/03/2022    Procedure: RIGHT FOREARM ARTERIOVENOUS FISTULA PLACEMENT RADIOCEPHALIC WITH CEPHALIC VEIN TRANSPOSITION;  Surgeon: Eliseo Willis MD;  Location: [facility] MAIN OR;  Service: Vascular;  Laterality: Right;    COLONOSCOPY      over 20 years ago.  no polyps     COLONOSCOPY N/A 09/18/2018    Procedure: COLONOSCOPY;  Surgeon: Jose Enrique Louie MD;  Location: [facility] ENDOSCOPY;  Service: Gastroenterology    COLONOSCOPY N/A 09/19/2018    IH, EH, polyps (TAs w/low grade dysplasia)    COLONOSCOPY N/A 06/01/2020    Procedure: COLONOSCOPY;  Surgeon: Jose Enrique Louie MD;  Location: [facility] ENDOSCOPY;  Service: Gastroenterology;  Laterality: N/A;  Pre op-Anemia, Melena, History of Polyps  Post op-To Cecum/TI, Polyp, Poor Prep    CORONARY ARTERY BYPASS GRAFT  11/2001    ENDOSCOPY N/A 05/29/2020    Procedure: ESOPHAGOGASTRODUODENOSCOPY with biopsies;  Surgeon: Amanda Lovelace MD;  Location: [facility] ENDOSCOPY;  Service: Gastroenterology;  Laterality: N/A;  pre-anemia, dark stools  post-esophagitis, hiatal hernia    ENDOSCOPY N/A 09/15/2020    Procedure: ESOPHAGOGASTRODUODENOSCOPY WITH BIOPSIES;  Surgeon: Jose Enrique Louie MD;  Location: [facility] ENDOSCOPY;  Service: Gastroenterology;  Laterality: N/A;  pre: history of melena and esophagitis  post: mild esophagitis and gastritis, small hiatal hernia    ENDOSCOPY N/A 12/4/2023    Procedure: ESOPHAGOGASTRODUODENOSCOPY with bx;  Surgeon: Quoc Elmore  MD JANINE;  Location: [facility] ENDOSCOPY;  Service: Gastroenterology;  Laterality: N/A;  pre: Coffee-ground emesis  post: hiatal hernia, esophagitis    HERNIA REPAIR Left 12/05/2019    THORACENTESIS      TONSILLECTOMY      VASECTOMY        General Information       Row Name 12/08/23 1152          Physical Therapy Time and Intention    Document Type therapy note (daily note)  -EM     Mode of Treatment individual therapy;physical therapy  -EM       Row Name 12/08/23 1152          General Information    Existing Precautions/Restrictions fall  -EM               User Key  (r) = Recorded By, (t) = Taken By, (c) = Cosigned By      Initials Name Provider Type    EM Mary Jo Hernandez, PT Physical Therapist                   Mobility       Row Name 12/08/23 1152          Bed Mobility    Supine-Sit [place] (Bed Mobility) minimum assist (75% patient effort);verbal cues  -EM     Assistive Device (Bed Mobility) head of bed elevated  -EM     Comment, (Bed Mobility) increased time to complete, cues for sequencing, sits with very kyphotic posture  -EM       Row Name 12/08/23 1152          Sit-Stand Transfer    Sit-Stand [place] (Transfers) minimum assist (75% patient effort);verbal cues  -EM     Assistive Device (Sit-Stand Transfers) walker, front-wheeled  -EM     Comment, (Sit-Stand Transfer) cues for hand placement, pt stood and then sat back down to use urinal  -EM       Row Name 12/08/23 1152          Gait/Stairs (Locomotion)    [place] Level (Gait) minimum assist (75% patient effort)  -EM     Assistive Device (Gait) walker, front-wheeled  -EM     Distance in Feet (Gait) 30  -EM     Deviations/Abnormal Patterns (Gait) stride length decreased  -EM     Bilateral Gait Deviations forward flexed posture  -EM     Comment, (Gait/Stairs) cues to keep both hands on rwx, reaching out with L hand for end of bed, tray table  -EM               User Key  (r) = Recorded By, (t) = Taken By, (c) = Cosigned By      Initials Name  Provider Type    EM Mary Jo Hernandez PT Physical Therapist                   Obj/Interventions    No documentation.                  Goals/Plan       Row Name 12/08/23 1156          Bed Mobility Goal 1 (PT)    Activity/Assistive Device (Bed Mobility Goal 1, PT) bed mobility activities, all  -EM     [place] Level/Cues Needed (Bed Mobility Goal 1, PT) contact guard required  -EM     Time Frame (Bed Mobility Goal 1, PT) 1 week  -EM     Progress/Outcomes (Bed Mobility Goal 1, PT) goal revised this date  -EM       Row Name 12/08/23 1156          Transfer Goal 1 (PT)    Activity/Assistive Device (Transfer Goal 1, PT) sit-to-stand/stand-to-sit;walker, rolling  -EM     [place] Level/Cues Needed (Transfer Goal 1, PT) contact guard required  -EM     Time Frame (Transfer Goal 1, PT) 1 week  -EM     Progress/Outcome (Transfer Goal 1, PT) goal revised this date  -EM       Row Name 12/08/23 1156          Gait Training Goal 1 (PT)    Activity/Assistive Device (Gait Training Goal 1, PT) gait (walking locomotion);walker, rolling  -EM     [place] Level (Gait Training Goal 1, PT) contact guard required  -EM     Distance (Gait Training Goal 1, PT) 50  -EM     Time Frame (Gait Training Goal 1, PT) 1 week  -EM     Progress/Outcome (Gait Training Goal 1, PT) goal revised this date  -EM               User Key  (r) = Recorded By, (t) = Taken By, (c) = Cosigned By      Initials Name Provider Type    EM Mary Jo Hernandez PT Physical Therapist                   Clinical Impression       Row Name 12/08/23 1154          Pain    Pretreatment Pain Rating 0/10 - no pain  -EM       Row Name 12/08/23 1154          Plan of Care Review    Plan of Care Reviewed With patient  -EM     Outcome Evaluation Pt agreeable to therapy, very slow to respond, increased time needed to complete mobility. Pt performed bed mobility with Shakeel, sit to stand with Shakeel, pt then stated he needed to use urinal so returned to sitting on EOB and  assisted with use of urinal, pt ambulated 30 feet with rwx and Shakeel, constant cues for upright posture and to keep both hands on the rwx especially when turning to sit. Anticipate d/c to SNU soon.  -EM       Row Name 12/08/23 1154          Positioning and Restraints    Pre-Treatment Position in bed  -EM     Post Treatment Position chair  -EM     In Chair reclined;call light within reach;exit alarm on;with nsg  -EM               User Key  (r) = Recorded By, (t) = Taken By, (c) = Cosigned By      Initials Name Provider Type    Mary Jo Manuel PT Physical Therapist                   Outcome Measures       Row Name 12/08/23 1157 12/08/23 0842       How much help from another person do you currently need...    Turning from your back to your side while in flat bed without using bedrails? 3  -EM 3  -JL    Moving from lying on back to sitting on the side of a flat bed without bedrails? 3  -EM 3  -JL    Moving to and from a bed to a chair (including a wheelchair)? 3  -EM 3  -JL    Standing up from a chair using your arms (e.g., wheelchair, bedside chair)? 3  -EM 3  -JL    Climbing 3-5 steps with a railing? 3  -EM 3  -JL    To walk in hospital room? 3  -EM 3  -JL    AM-PAC 6 Clicks Score (PT) 18  -EM 18  -JL    Highest Level of Mobility Goal 6 --> Walk 10 steps or more  -EM 6 --> Walk 10 steps or more  -JL              User Key  (r) = Recorded By, (t) = Taken By, (c) = Cosigned By      Initials Name Provider Type    Mary Jo Manuel PT Physical Therapist    Yovani Delgado, RN Registered Nurse                                 Physical Therapy Education       Title: PT OT SLP Therapies (In Progress)       Topic: Physical Therapy (In Progress)       Point: Mobility training (Done)       Learning Progress Summary             Patient Acceptance, E, VU by EM at 12/8/2023 1157    Acceptance, E,D, VU,NR by EB at 12/6/2023 1508    Acceptance, E,TB, VU,DU by CS at 12/4/2023 1550    Acceptance, E,TB, VU,NR by EE at  12/1/2023 1525                         Point: Home exercise program (Not Started)       Learner Progress:  Not documented in this visit.              Point: Body mechanics (Done)       Learning Progress Summary             Patient Acceptance, E,D, VU,NR by EB at 12/6/2023 1508    Acceptance, E,TB, VU,DU by CS at 12/4/2023 1550    Acceptance, E,TB, VU,NR by  at 12/1/2023 1525                         Point: Precautions (Done)       Learning Progress Summary             Patient Acceptance, E,D, VU,NR by  at 12/6/2023 1508    Acceptance, E,TB, VU,DU by  at 12/4/2023 1550                                         User Key       Initials Effective Dates Name Provider Type Discipline    EE 06/16/21 -  Katrin Garcia, PT Physical Therapist PT    EM 06/16/21 -  Mary Jo Hernandez PT Physical Therapist PT     02/14/23 -  Stefany Early, PTA Physical Therapist Assistant PT     09/22/22 -  Tiffany Sheth, PT Physical Therapist PT                  PT Recommendation and Plan     Plan of Care Reviewed With: patient  Outcome Evaluation: Pt agreeable to therapy, very slow to respond, increased time needed to complete mobility. Pt performed bed mobility with Shakeel, sit to stand with Shakeel, pt then stated he needed to use urinal so returned to sitting on EOB and assisted with use of urinal, pt ambulated 30 feet with rwx and Shakeel, constant cues for upright posture and to keep both hands on the rwx especially when turning to sit. Anticipate d/c to SNU soon.     Time Calculation:         PT Charges       Row Name 12/08/23 1158             Time Calculation    Start Time 1120  -EM      Stop Time 1143  -EM      Time Calculation (min) 23 min  -EM      PT Received On 12/08/23  -EM      PT - Next Appointment 12/11/23  -EM      PT Goal Re-Cert Due Date 12/15/23  -EM         Time Calculation- PT    Total Timed Code Minutes- PT 23 minute(s)  -EM         Timed Charges    69721 - PT Therapeutic Activity Minutes 23  -EM         Total Minutes     Timed Charges Total Minutes 23  -EM       Total Minutes 23  -EM                User Key  (r) = Recorded By, (t) = Taken By, (c) = Cosigned By      Initials Name Provider Type    Mary Jo Manuel PT Physical Therapist                  Therapy Charges for Today       Code Description Service Date Service Provider Modifiers Qty    [id number]  PT THERAPEUTIC ACT EA 15 MIN 12/8/2023 Mary Jo Hernandez PT GP 2            PT G-Codes  Outcome Measure Options: AM-PAC 6 Clicks Basic Mobility (PT)  AM-PAC 6 Clicks Score (PT): 18  PT Discharge Summary  Anticipated Discharge Disposition (PT): skilled nursing facility    Mary Jo Hernandez PT  12/8/2023

## 2023-12-08 NOTE — CASE MANAGEMENT/SOCIAL WORK
Continued Stay Note  [facility]     Patient Name: Carlito Mo  MRN: [id number]  Today's Date: 12/8/2023    Admit Date: 11/28/2023    Plan: [facility] pending pre-cert, HD setup still pending   Discharge Plan       Row Name 12/08/23 1548       Plan    Plan [facility] pending pre-cert, HD setup still pending    Patient/Family in Agreement with Plan yes    Plan Comments Pre-cert still pending. [facility] notified and states still awaiting HD schedule with Peter at [facility]. MD's and RN notified. Partial packet in [facility], pharmacy updated to [facility] in [facility]. Alejandra BENZ/CCP                   Discharge Codes    No documentation.                 Expected Discharge Date and Time       Expected Discharge Date Expected Discharge Time    Dec 11, 2023               Marivel Hernandez, RN

## 2023-12-08 NOTE — PLAN OF CARE
Goal Outcome Evaluation:  Plan of Care Reviewed With: patient           Outcome Evaluation: Pt agreeable to therapy, very slow to respond, increased time needed to complete mobility. Pt performed bed mobility with Shakeel, sit to stand with Shakeel, pt then stated he needed to use urinal so returned to sitting on EOB and assisted with use of urinal, pt ambulated 30 feet with rwx and Shakeel, constant cues for upright posture and to keep both hands on the rwx especially when turning to sit. Anticipate d/c to SNU soon.      Anticipated Discharge Disposition (PT): skilled nursing facility

## 2023-12-09 PROBLEM — E43 SEVERE MALNUTRITION: Status: ACTIVE | Noted: 2023-12-09

## 2023-12-09 LAB
ALBUMIN SERPL-MCNC: 2.8 G/DL (ref 3.5–5.2)
ANION GAP SERPL CALCULATED.3IONS-SCNC: 9.2 MMOL/L (ref 5–15)
BASOPHILS # BLD AUTO: 0.01 10*3/MM3 (ref 0–0.2)
BASOPHILS NFR BLD AUTO: 0.1 % (ref 0–1.5)
BUN SERPL-MCNC: 38 MG/DL (ref 8–23)
BUN/CREAT SERPL: 13.8 (ref 7–25)
CALCIUM SPEC-SCNC: 8.3 MG/DL (ref 8.6–10.5)
CHLORIDE SERPL-SCNC: 105 MMOL/L (ref 98–107)
CO2 SERPL-SCNC: 24.8 MMOL/L (ref 22–29)
CREAT SERPL-MCNC: 2.76 MG/DL (ref 0.76–1.27)
DEPRECATED RDW RBC AUTO: 40.2 FL (ref 37–54)
EGFRCR SERPLBLD CKD-EPI 2021: 24.2 ML/MIN/1.73
EOSINOPHIL # BLD AUTO: 0.1 10*3/MM3 (ref 0–0.4)
EOSINOPHIL NFR BLD AUTO: 1.2 % (ref 0.3–6.2)
ERYTHROCYTE [DISTWIDTH] IN BLOOD BY AUTOMATED COUNT: 13.4 % (ref 12.3–15.4)
GLUCOSE BLDC GLUCOMTR-MCNC: 131 MG/DL (ref 70–130)
GLUCOSE BLDC GLUCOMTR-MCNC: 158 MG/DL (ref 70–130)
GLUCOSE BLDC GLUCOMTR-MCNC: 74 MG/DL (ref 70–130)
GLUCOSE BLDC GLUCOMTR-MCNC: 87 MG/DL (ref 70–130)
GLUCOSE SERPL-MCNC: 96 MG/DL (ref 65–99)
HCT VFR BLD AUTO: 27.2 % (ref 37.5–51)
HGB BLD-MCNC: 8.2 G/DL (ref 13–17.7)
IMM GRANULOCYTES # BLD AUTO: 0.04 10*3/MM3 (ref 0–0.05)
IMM GRANULOCYTES NFR BLD AUTO: 0.5 % (ref 0–0.5)
LYMPHOCYTES # BLD AUTO: 1.03 10*3/MM3 (ref 0.7–3.1)
LYMPHOCYTES NFR BLD AUTO: 12.5 % (ref 19.6–45.3)
MCH RBC QN AUTO: 25.4 PG (ref 26.6–33)
MCHC RBC AUTO-ENTMCNC: 30.1 G/DL (ref 31.5–35.7)
MCV RBC AUTO: 84.2 FL (ref 79–97)
MONOCYTES # BLD AUTO: 0.88 10*3/MM3 (ref 0.1–0.9)
MONOCYTES NFR BLD AUTO: 10.7 % (ref 5–12)
NEUTROPHILS NFR BLD AUTO: 6.15 10*3/MM3 (ref 1.7–7)
NEUTROPHILS NFR BLD AUTO: 75 % (ref 42.7–76)
NRBC BLD AUTO-RTO: 0 /100 WBC (ref 0–0.2)
PHOSPHATE SERPL-MCNC: 2.8 MG/DL (ref 2.5–4.5)
PLATELET # BLD AUTO: 322 10*3/MM3 (ref 140–450)
PMV BLD AUTO: 9.6 FL (ref 6–12)
POTASSIUM SERPL-SCNC: 4.8 MMOL/L (ref 3.5–5.2)
RBC # BLD AUTO: 3.23 10*6/MM3 (ref 4.14–5.8)
SODIUM SERPL-SCNC: 139 MMOL/L (ref 136–145)
WBC NRBC COR # BLD AUTO: 8.21 10*3/MM3 (ref 3.4–10.8)

## 2023-12-09 PROCEDURE — 63710000001 INSULIN REGULAR HUMAN PER 5 UNITS: Performed by: INTERNAL MEDICINE

## 2023-12-09 PROCEDURE — 63710000001 INSULIN GLARGINE PER 5 UNITS: Performed by: INTERNAL MEDICINE

## 2023-12-09 PROCEDURE — 94761 N-INVAS EAR/PLS OXIMETRY MLT: CPT

## 2023-12-09 PROCEDURE — 85025 COMPLETE CBC W/AUTO DIFF WBC: CPT | Performed by: INTERNAL MEDICINE

## 2023-12-09 PROCEDURE — 82948 REAGENT STRIP/BLOOD GLUCOSE: CPT

## 2023-12-09 PROCEDURE — 94799 UNLISTED PULMONARY SVC/PX: CPT

## 2023-12-09 PROCEDURE — 80069 RENAL FUNCTION PANEL: CPT | Performed by: INTERNAL MEDICINE

## 2023-12-09 PROCEDURE — 94664 DEMO&/EVAL PT USE INHALER: CPT

## 2023-12-09 RX ADMIN — INSULIN GLARGINE 2 UNITS: 100 INJECTION, SOLUTION SUBCUTANEOUS at 21:24

## 2023-12-09 RX ADMIN — CETIRIZINE HYDROCHLORIDE 10 MG: 10 TABLET ORAL at 12:22

## 2023-12-09 RX ADMIN — PANTOPRAZOLE SODIUM 40 MG: 40 TABLET, DELAYED RELEASE ORAL at 17:41

## 2023-12-09 RX ADMIN — SODIUM BICARBONATE 650 MG: 650 TABLET, ORALLY DISINTEGRATING ORAL at 17:41

## 2023-12-09 RX ADMIN — PANTOPRAZOLE SODIUM 40 MG: 40 TABLET, DELAYED RELEASE ORAL at 05:07

## 2023-12-09 RX ADMIN — APIXABAN 2.5 MG: 2.5 TABLET, FILM COATED ORAL at 12:22

## 2023-12-09 RX ADMIN — METOPROLOL TARTRATE 5 MG: 1 INJECTION, SOLUTION INTRAVENOUS at 15:56

## 2023-12-09 RX ADMIN — APIXABAN 2.5 MG: 2.5 TABLET, FILM COATED ORAL at 20:08

## 2023-12-09 RX ADMIN — Medication 10 ML: at 20:08

## 2023-12-09 RX ADMIN — LEVOTHYROXINE SODIUM 75 MCG: 75 TABLET ORAL at 05:07

## 2023-12-09 RX ADMIN — SODIUM BICARBONATE 650 MG: 650 TABLET, ORALLY DISINTEGRATING ORAL at 20:08

## 2023-12-09 RX ADMIN — BUDESONIDE AND FORMOTEROL FUMARATE DIHYDRATE 2 PUFF: 160; 4.5 AEROSOL RESPIRATORY (INHALATION) at 22:01

## 2023-12-09 RX ADMIN — ROSUVASTATIN CALCIUM 10 MG: 10 TABLET, FILM COATED ORAL at 12:22

## 2023-12-09 RX ADMIN — BUDESONIDE AND FORMOTEROL FUMARATE DIHYDRATE 2 PUFF: 160; 4.5 AEROSOL RESPIRATORY (INHALATION) at 12:18

## 2023-12-09 RX ADMIN — CARVEDILOL 6.25 MG: 6.25 TABLET, FILM COATED ORAL at 18:27

## 2023-12-09 RX ADMIN — METOPROLOL TARTRATE 5 MG: 1 INJECTION, SOLUTION INTRAVENOUS at 13:56

## 2023-12-09 RX ADMIN — INSULIN HUMAN 2 UNITS: 100 INJECTION, SOLUTION PARENTERAL at 17:41

## 2023-12-09 NOTE — PROGRESS NOTES
Name: Carlito Mo ADMIT: 2023   : 1955  PCP: Florencia Caballero MD    MRN: [id number] LOS: 10 days   AGE/SEX: 68 y.o. male  ROOM: [facility]     Subjective   Subjective   Patient seen this morning, no acute events overnight.  Denies any complaints.  Pending placement.  Plan for dialysis today.    Review of Systems   As above  Objective   Objective   Vital Signs  Temp:  [97.3 °F (36.3 °C)-98.1 °F (36.7 °C)] 97.3 °F (36.3 °C)  Heart Rate:  [] 141  Resp:  [16-18] 16  BP: (100-124)/(62-73) 100/62  SpO2:  [96 %-98 %] 98 %  on  Flow (L/min):  [6] 6;   Device (Oxygen Therapy): room air  Body mass index is 23.24 kg/m².  Physical Exam    General: Alert, laying in bed, not in distress, chronically ill-appearing  HEENT: Normocephalic, atraumatic  CV: Regular rate and rhythm, no murmurs   Lungs: Clear to auscultation bilaterally, no wheezing  Abdomen: Soft, nontender, nondistended  Extremities: No significant peripheral edema , no cyanosis     Results Review     I reviewed the patient's new clinical results.  Results from last 7 days   Lab Units 23   WBC 10*3/mm3 8.21  --  8.91 5.78 8.80   HEMOGLOBIN g/dL 8.2* 8.3* 8.1* 7.8* 7.8*   PLATELETS 10*3/mm3 322  --  296 257 262     Results from last 7 days   Lab Units 23  03323  03523   SODIUM mmol/L 139 137 138 138   POTASSIUM mmol/L 4.8 4.5 4.5 5.0   CHLORIDE mmol/L 105 104 105 106   CO2 mmol/L 24.8 24.6 25.9 27.0   BUN mg/dL 38* 29* 20 39*   CREATININE mg/dL 2.76* 3.07* 2.22* 3.22*   GLUCOSE mg/dL 96 88 116* 87   Estimated Creatinine Clearance: 23.7 mL/min (A) (by C-G formula based on SCr of 2.76 mg/dL (H)).  Results from last 7 days   Lab Units 23  0331 23  0357 23  0349 23  0318   ALBUMIN g/dL 2.8* 2.9* 2.5* 2.8*     Results from last 7 days   Lab Units 23  0331 23  0357 23  0349 23  0318  12/04/23  0259   CALCIUM mg/dL 8.3* 8.4* 8.1* 8.5* 8.2*   ALBUMIN g/dL 2.8* 2.9* 2.5* 2.8* 2.8*   MAGNESIUM mg/dL  --   --   --   --  1.8   PHOSPHORUS mg/dL 2.8 3.1 2.9 2.9 2.2*           COVID19   Date Value Ref Range Status   05/02/2023 Not Detected Not Detected - Ref. Range Final   03/07/2023 Not Detected Not Detected - Ref. Range Final     Glucose   Date/Time Value Ref Range Status   12/09/2023 1231 87 70 - 130 mg/dL Final   12/09/2023 0632 74 70 - 130 mg/dL Final   12/08/2023 2133 149 (H) 70 - 130 mg/dL Final   12/08/2023 1649 127 70 - 130 mg/dL Final   12/08/2023 1143 169 (H) 70 - 130 mg/dL Final   12/08/2023 0611 130 70 - 130 mg/dL Final   12/07/2023 2046 101 70 - 130 mg/dL Final           XR Chest 1 View  Narrative: SINGLE VIEW OF THE CHEST     HISTORY: Shortness of breath     COMPARISON: November 2030 is 23     FINDINGS:  The patient status post median sternotomy with coronary artery bypass  grafting. There is cardiomegaly. There is calcification of the aorta. No  pneumothorax or pleural effusion is seen. No definite acute infiltrates  are identified.     Impression: No acute findings.     This report was finalized on 11/28/2023 9:51 PM by Dr. Alice Allen M.D on Workstation: [id number]       Scheduled Medications  apixaban, 2.5 mg, Oral, Q12H  budesonide-formoterol, 2 puff, Inhalation, BID - RT  carvedilol, 6.25 mg, Oral, BID With Meals  cetirizine, 10 mg, Oral, Daily  insulin glargine, 2 Units, Subcutaneous, Nightly  insulin regular, 2-7 Units, Subcutaneous, 4x Daily AC & at Bedtime  levothyroxine, 75 mcg, Oral, Q AM  pantoprazole, 40 mg, Oral, BID AC  rosuvastatin, 10 mg, Oral, Daily  sodium bicarbonate, 650 mg, Oral, TID  sodium chloride, 500 mL, Intravenous, Once  sodium chloride, 10 mL, Intravenous, Q12H  sodium chloride, 10 mL, Intravenous, Q12H    Infusions     Diet  Diet: Diabetic Diets; Consistent Carbohydrate; Texture: Regular Texture (IDDSI 7); Fluid Consistency: Thin (IDDSI 0)    I have  personally reviewed     [x]  Laboratory   []  Microbiology   []  Radiology   []  EKG/Telemetry  []  Cardiology/Vascular   []  Pathology    []  Records       Assessment/Plan     Active Hospital Problems    Diagnosis  POA    **Atrial fibrillation with rapid ventricular response [I48.91]  Yes    Severe malnutrition [E43]  Yes    ESRD (end stage renal disease) [N18.6]  Yes    Nausea & vomiting [R11.2]  Yes    Hemoptysis [R04.2]  Yes    Coffee ground emesis [K92.0]  Unknown    Embolic stroke [I63.9]  Yes    Coronary artery disease involving native coronary artery of native heart without angina pectoris [I25.10]  Yes    Chronic combined systolic and diastolic CHF (congestive heart failure) [I50.42]  Yes    Acquired hypothyroidism [E03.9]  Yes    Benign prostatic hyperplasia with nocturia [N40.1, R35.1]  Yes    Type 2 diabetes mellitus, with long-term current use of insulin [E11.9, Z79.4]  Not Applicable      Resolved Hospital Problems   No resolved problems to display.       68 y.o. male admitted with Atrial fibrillation with rapid ventricular response.    GI bleeding/coffee ground emesis/diarrhea-  -possible viral gastroenteritis  GI evaluated, status post EGD 12/04/2023 which showed hiatal hernia and LA grade B esophagitis.  Biopsies consistent with chronic inflammation without evidence of intestinal metaplasia or H. pylori   -Status post IV PPI continue p.o. pantoprazole 40 mg twice daily  -Eliquis resumed at lower dose 2.5 mg twice daily 12/04.  Cardiology recommended to continue low-dose follow-up couple weeks, and transition outpatient to 5 mg twice daily if hemoglobin remains stable and no further episodes of bleeding.  -Hemoglobin stable          Afib with rvr-s/p diltiazem drip. On low dose coreg, eliquis as above.    Cardiolog evaluated..  Will need follow-up outpatient in 2 weeks.    Notified by RN heart rate increased up to 150s this afternoon, ordered one-time IV metoprolol.  Will follow  results.  -    Elevated troponin due to ESRD and type 2 NSTEMI from rvr-no plans for ischemic workup at this time.    Metabolic encephalopathy-resolved    Coronary artery disease s/p CABG in 2001-statin/bb. Asa on hold,  on Eliquis.    Chronic combined systolic and diastolic heart failure-bb. Torsemide is held. euvolemic    ESRD on HD T, TH, Sa-nephrology is managing dialysis    Anemia of chronic disease-on HAI as an outpatient    Type 2 diabetes-A1c 6.9. He is on a very low dose of glargine as an outpatient. Continue sliding scale.    COPD with chronic respiratory failure with hypoxia on home O2-symbicort    Hypothyroidism-synthroid    History of embolic CVA-Eliquis 2.5 p.o. twice daily      DVT prophylax: Eliquis  Full code.  Discussed with patient.  Discussed with CCP  Anticipate discharge to SNU, pending pre-CERT, medically stable to be discharged.  Likely on Monday.      Copied text in this note has been reviewed and is accurate as of 12/09/23.         Dictated utilizing Dragon dictation        Aria Red MD  [facility]  12/09/23  15:28 EST

## 2023-12-09 NOTE — PLAN OF CARE
Goal Outcome Evaluation:  Plan of Care Reviewed With: patient           Outcome Evaluation: pt went to dialysis today 1.2 L fluid removed. bp has been running low, HR up to 150's gave 2 doses of iv lopressor 5 mg. alert oriented X3, RA, afib. will cont to monitor         Problem: Adjustment to Illness (Sepsis/Septic Shock)  Goal: Optimal Coping  Outcome: Ongoing, Progressing  Intervention: Optimize Psychosocial Adjustment to Illness  Recent Flowsheet Documentation  Taken 12/9/2023 1452 by Karin Lawson, RN  Supportive Measures: active listening utilized  Family/Support System Care: support provided  Taken 12/9/2023 1239 by Karin Lawson, RN  Supportive Measures: active listening utilized  Family/Support System Care: support provided

## 2023-12-09 NOTE — CASE MANAGEMENT/SOCIAL WORK
Post-Acute Authorization Submission      Post Acute Pre-Cert Documentation  Request Submitted by Facility - Type:: Hospital  Post-Acute Authorization Type Submitted:: SNF  Date Post Acute Pre-Cert Inititated per Facility: 12/09/23  Verification from Payer: Yes  Date Post Acute Pre-Cert Completed: 12/08/23  Accepting Facility: [facility] Discharge Date Requested: 12/08/23  All Clinicals Submitted?: Yes  Had Accepting Facility at Time of Submission: Yes  Response Received from Insurance?: Approval  Response Communicated to:: , Accepting Facility Liaison (M with Central Intake re: precert approval)  Authorization Number:: [id number]  Post Acute Pre-Cert Initiated Comment: Karly GONZALEZ RN, BSN, ACM-RN

## 2023-12-09 NOTE — PROGRESS NOTES
[facility] Progress Note      Patient Name: Carlito Mo  : 1955  MRN: [id number]  Primary Care Physician:  Florencia Caballero MD  Date of admission: 2023    Subjective     Interval History:   Follow-up ESRD.      Had HD earlier today; 1.2 L removed  Appetite is fair; no N/V  Breathing is comfortable on room air      Review of Systems:   As noted above    Objective     Vitals:   Temp:  [97.3 °F (36.3 °C)-98.1 °F (36.7 °C)] 97.3 °F (36.3 °C)  Heart Rate:  [] 141  Resp:  [16-18] 16  BP: (100-124)/(62-73) 100/62  Flow (L/min):  [6] 6    Intake/Output Summary (Last 24 hours) at 2023 1749  Last data filed at 2023 1242  Gross per 24 hour   Intake 240 ml   Output 1525 ml   Net -1285 ml       Physical Exam:    General: Alert, chronically ill, frail, NAD, gaunt  Psychiatric: Flat affect and depressed mood  Skin: warm and dry  HEENT: Mucosa dry, nonicteric sclera; temporal wasting  Neck: supple, no JVD  Lungs clear to auscultation bilaterally, unlabored breathing effort  Heart: Irregular irregular, no rub  Abdomen: soft, nontender, nondistended. +bs   Extremities: no significant edema.  Right upper extremity AV fistula patent    Scheduled Meds:     apixaban, 2.5 mg, Oral, Q12H  budesonide-formoterol, 2 puff, Inhalation, BID - RT  carvedilol, 6.25 mg, Oral, BID With Meals  cetirizine, 10 mg, Oral, Daily  insulin glargine, 2 Units, Subcutaneous, Nightly  insulin regular, 2-7 Units, Subcutaneous, 4x Daily AC & at Bedtime  levothyroxine, 75 mcg, Oral, Q AM  pantoprazole, 40 mg, Oral, BID AC  rosuvastatin, 10 mg, Oral, Daily  sodium bicarbonate, 650 mg, Oral, TID  sodium chloride, 500 mL, Intravenous, Once  sodium chloride, 10 mL, Intravenous, Q12H  sodium chloride, 10 mL, Intravenous, Q12H      IV Meds:          Results Reviewed:   I have personally reviewed the results from the time of this admission to 2023 17:49 EST     Results from last 7 days   Lab Units  12/09/23 0331 12/07/23 0357 12/06/23 0349   SODIUM mmol/L 139 137 138   POTASSIUM mmol/L 4.8 4.5 4.5   CHLORIDE mmol/L 105 104 105   CO2 mmol/L 24.8 24.6 25.9   BUN mg/dL 38* 29* 20   CREATININE mg/dL 2.76* 3.07* 2.22*   CALCIUM mg/dL 8.3* 8.4* 8.1*   GLUCOSE mg/dL 96 88 116*       Estimated Creatinine Clearance: 23.7 mL/min (A) (by C-G formula based on SCr of 2.76 mg/dL (H)).    Results from last 7 days   Lab Units 12/09/23 0331 12/07/23 0357 12/06/23 0349 12/05/23 0318 12/04/23 0259   MAGNESIUM mg/dL  --   --   --   --  1.8   PHOSPHORUS mg/dL 2.8 3.1 2.9   < > 2.2*    < > = values in this interval not displayed.             Results from last 7 days   Lab Units 12/09/23 0332 12/08/23 0316 12/07/23 0357 12/06/23 0349 12/05/23 0318 12/04/23 0259   WBC 10*3/mm3 8.21  --  8.91 5.78 8.80 9.56   HEMOGLOBIN g/dL 8.2* 8.3* 8.1* 7.8* 7.8* 8.0*   PLATELETS 10*3/mm3 322  --  296 257 262 249               Assessment / Plan     ASSESSMENT:  1.  ESRD.  Improving volume excess, and stable electrolytes.  HD planned at [facility], though schedule is not yet clear.  Had HD earlier today with 1.2 L removed  2.  Paroxysmal A-fib with rapid ventricular response.  On Coreg and Eliquis (lower dose).  3.  GI bleed with coffee ground emesis.  Esophagitis on EGD.  Chronic inflammation.  On pantoprazole  4.  Type II NSTEMI.  5.  Chronic combined systolic and diastolic heart failure.  Removing volume with dialysis as blood pressure allows..  6.  Anemia of chronic kidney disease.  On HAI as outpatient  7.  Hypothyroidism on replacement.  8.  History of embolic CVA  9.  DM2 with renal complication    PLAN:  Await HD schedule at [facility]; will transition to that schedule while patient remains inpatient once it is known  Encouraged him to eat  Discharge anytime from renal view    I reviewed the patient's chart, labs, and other providers' notes  Thank you for involving us in the care of Carlito Mo.  Please feel free to  call with any questions.    Ramakrishna Blake MD  12/09/23  17:49 EST    [facility]  [phone]    Please note that portions of this note were completed with a voice recognition program.

## 2023-12-10 LAB
GLUCOSE BLDC GLUCOMTR-MCNC: 150 MG/DL (ref 70–130)
GLUCOSE BLDC GLUCOMTR-MCNC: 169 MG/DL (ref 70–130)
GLUCOSE BLDC GLUCOMTR-MCNC: 198 MG/DL (ref 70–130)
GLUCOSE BLDC GLUCOMTR-MCNC: 85 MG/DL (ref 70–130)
HCT VFR BLD AUTO: 27.2 % (ref 37.5–51)
HGB BLD-MCNC: 8.5 G/DL (ref 13–17.7)

## 2023-12-10 PROCEDURE — 94664 DEMO&/EVAL PT USE INHALER: CPT

## 2023-12-10 PROCEDURE — 63710000001 INSULIN REGULAR HUMAN PER 5 UNITS: Performed by: INTERNAL MEDICINE

## 2023-12-10 PROCEDURE — 94760 N-INVAS EAR/PLS OXIMETRY 1: CPT

## 2023-12-10 PROCEDURE — 94799 UNLISTED PULMONARY SVC/PX: CPT

## 2023-12-10 PROCEDURE — 99232 SBSQ HOSP IP/OBS MODERATE 35: CPT | Performed by: INTERNAL MEDICINE

## 2023-12-10 PROCEDURE — 94761 N-INVAS EAR/PLS OXIMETRY MLT: CPT

## 2023-12-10 PROCEDURE — 63710000001 INSULIN GLARGINE PER 5 UNITS: Performed by: INTERNAL MEDICINE

## 2023-12-10 PROCEDURE — 82948 REAGENT STRIP/BLOOD GLUCOSE: CPT

## 2023-12-10 PROCEDURE — 85018 HEMOGLOBIN: CPT | Performed by: STUDENT IN AN ORGANIZED HEALTH CARE EDUCATION/TRAINING PROGRAM

## 2023-12-10 PROCEDURE — 85014 HEMATOCRIT: CPT | Performed by: STUDENT IN AN ORGANIZED HEALTH CARE EDUCATION/TRAINING PROGRAM

## 2023-12-10 RX ADMIN — APIXABAN 2.5 MG: 2.5 TABLET, FILM COATED ORAL at 21:36

## 2023-12-10 RX ADMIN — ROSUVASTATIN CALCIUM 10 MG: 10 TABLET, FILM COATED ORAL at 08:28

## 2023-12-10 RX ADMIN — BUDESONIDE AND FORMOTEROL FUMARATE DIHYDRATE 2 PUFF: 160; 4.5 AEROSOL RESPIRATORY (INHALATION) at 08:38

## 2023-12-10 RX ADMIN — INSULIN HUMAN 2 UNITS: 100 INJECTION, SOLUTION PARENTERAL at 17:08

## 2023-12-10 RX ADMIN — BUDESONIDE AND FORMOTEROL FUMARATE DIHYDRATE 2 PUFF: 160; 4.5 AEROSOL RESPIRATORY (INHALATION) at 19:29

## 2023-12-10 RX ADMIN — PANTOPRAZOLE SODIUM 40 MG: 40 TABLET, DELAYED RELEASE ORAL at 17:10

## 2023-12-10 RX ADMIN — Medication 10 ML: at 21:36

## 2023-12-10 RX ADMIN — SODIUM BICARBONATE 650 MG: 650 TABLET, ORALLY DISINTEGRATING ORAL at 17:08

## 2023-12-10 RX ADMIN — PANTOPRAZOLE SODIUM 40 MG: 40 TABLET, DELAYED RELEASE ORAL at 06:24

## 2023-12-10 RX ADMIN — SODIUM BICARBONATE 650 MG: 650 TABLET, ORALLY DISINTEGRATING ORAL at 21:36

## 2023-12-10 RX ADMIN — INSULIN HUMAN 2 UNITS: 100 INJECTION, SOLUTION PARENTERAL at 21:38

## 2023-12-10 RX ADMIN — INSULIN GLARGINE 2 UNITS: 100 INJECTION, SOLUTION SUBCUTANEOUS at 21:37

## 2023-12-10 RX ADMIN — SODIUM BICARBONATE 650 MG: 650 TABLET, ORALLY DISINTEGRATING ORAL at 08:28

## 2023-12-10 RX ADMIN — CARVEDILOL 6.25 MG: 6.25 TABLET, FILM COATED ORAL at 17:13

## 2023-12-10 RX ADMIN — LEVOTHYROXINE SODIUM 75 MCG: 75 TABLET ORAL at 06:24

## 2023-12-10 RX ADMIN — CARVEDILOL 6.25 MG: 6.25 TABLET, FILM COATED ORAL at 08:28

## 2023-12-10 RX ADMIN — APIXABAN 2.5 MG: 2.5 TABLET, FILM COATED ORAL at 08:29

## 2023-12-10 RX ADMIN — CETIRIZINE HYDROCHLORIDE 10 MG: 10 TABLET ORAL at 08:28

## 2023-12-10 NOTE — CASE MANAGEMENT/SOCIAL WORK
Continued Stay Note  [facility]     Patient Name: Carlito Mo  MRN: [id number]  Today's Date: 12/10/2023    Admit Date: 11/28/2023    Plan: Precert approved for SNF at [facility]--waiting to hear back from facility regarding bed availability   Discharge Plan       Row Name 12/10/23 1457       Plan    Plan Precert approved for SNF at [facility]--waiting to hear back from facility regarding bed availability    Plan Comments Precert for SNF received yesterday afternoon. [facility] left at that time with [facility] Central Intake to verify bed availability. Return call from [facility] earlier today but no rep. name was given. Return call to [facility] and spoke with Bonita. Per Bonita, she will check with Obi/[facility], who is out for her lunch, to call CCP back with any updates on this patient. CCP to follow.......JW                   Discharge Codes    No documentation.                 Expected Discharge Date and Time       Expected Discharge Date Expected Discharge Time    Dec 11, 2023               Karly Yo RN

## 2023-12-10 NOTE — PLAN OF CARE
Goal Outcome Evaluation:  Plan of Care Reviewed With: patient        Progress: no change  Outcome Evaluation: All needs met. Rested well. Turns. Urinal. CC diet. VSS. Oriented x 4. RA. A-fib on the monitor. No complaints. Blood glucose monitoring.

## 2023-12-10 NOTE — PROGRESS NOTES
Hospital Follow Up    LOS: 11  Patient Name: Carlito Mo  Age/Sex: 68 y.o. male  : 1955  MRN: [id number]    Day of Service: 12/10/23   Length of Stay: 11  Encounter Provider: Alverto Templeton MD  Place of Service: [facility] CARDIOLOGY  Patient Care Team:  Florencia Caballero MD as PCP - General (Internal Medicine)  Amber Murguia MD as Consulting Physician (Cardiology)  Sera La RPH as Pharmacist  OSeth Conte MD as Surgeon (General Surgery)  Kim Hoyos MD PhD as Consulting Physician (Hematology and Oncology)  Jerome Diallo MD as Referring Physician (Family Medicine)  Jose Enrique Louie MD as Consulting Physician (Gastroenterology)  Nima Huddleston MD as Consulting Physician (Nephrology)    Subjective:     Chief Complaint: Atrial fibrillation    Interval History: Patient currently is doing well resting comfortably no complaints.  Patient's heart rate increased yesterday twice requiring IV Lopressor.  Patient however did not receive his carvedilol that morning due to the fact his blood pressure was little bit on the low side.    Objective:     Objective:  Temp:  [97.3 °F (36.3 °C)-98.1 °F (36.7 °C)] 98 °F (36.7 °C)  Heart Rate:  [] 70  Resp:  [16-18] 16  BP: (100-131)/(53-65) 131/65     Intake/Output Summary (Last 24 hours) at 12/10/2023 1232  Last data filed at 12/10/2023 0850  Gross per 24 hour   Intake 830 ml   Output 475 ml   Net 355 ml     Body mass index is 23.16 kg/m².      23  0615 23  1149 12/10/23  0500   Weight: 64.3 kg (141 lb 12.1 oz) 65.3 kg (143 lb 15.4 oz) 65.1 kg (143 lb 8.3 oz)     Weight change: 1 kg (2 lb 3.3 oz)      Physical Exam:   General :  Alert, cooperative, in no acute distress.  Neuro:   Alert,cooperative and oriented.  Lungs:  CTAB. Normal respiratory effort and rate.  CV:  irregular rate and rhythm, normal S1 and S2, no murmurs, gallops or rubs.   ABD:  Soft, nontender, nondistended. Positive  "bowel sounds.  Extr:  No edema or cyanosis, moves all extremities.    Lab Review:   Results from last 7 days   Lab Units 12/09/23  0331 12/07/23  0357   SODIUM mmol/L 139 137   POTASSIUM mmol/L 4.8 4.5   CHLORIDE mmol/L 105 104   CO2 mmol/L 24.8 24.6   BUN mg/dL 38* 29*   CREATININE mg/dL 2.76* 3.07*   GLUCOSE mg/dL 96 88   CALCIUM mg/dL 8.3* 8.4*         Results from last 7 days   Lab Units 12/10/23  0351 12/09/23  0332 12/08/23  0316 12/07/23  0357   WBC 10*3/mm3  --  8.21  --  8.91   HEMOGLOBIN g/dL 8.5* 8.2*   < > 8.1*   HEMATOCRIT % 27.2* 27.2*   < > 26.4*   PLATELETS 10*3/mm3  --  322  --  296    < > = values in this interval not displayed.         Results from last 7 days   Lab Units 12/04/23  0259   MAGNESIUM mg/dL 1.8           Invalid input(s): \"LDLCALC\"            Current Medications:   Scheduled Meds:apixaban, 2.5 mg, Oral, Q12H  budesonide-formoterol, 2 puff, Inhalation, BID - RT  carvedilol, 6.25 mg, Oral, BID With Meals  cetirizine, 10 mg, Oral, Daily  insulin glargine, 2 Units, Subcutaneous, Nightly  insulin regular, 2-7 Units, Subcutaneous, 4x Daily AC & at Bedtime  levothyroxine, 75 mcg, Oral, Q AM  pantoprazole, 40 mg, Oral, BID AC  rosuvastatin, 10 mg, Oral, Daily  sodium bicarbonate, 650 mg, Oral, TID  sodium chloride, 500 mL, Intravenous, Once  sodium chloride, 10 mL, Intravenous, Q12H  sodium chloride, 10 mL, Intravenous, Q12H      Continuous Infusions:     Allergies:  Allergies   Allergen Reactions    Prozac [Fluoxetine Hcl] Other (See Comments)     shaking       Assessment:       Atrial fibrillation with rapid ventricular response    Type 2 diabetes mellitus, with long-term current use of insulin    Benign prostatic hyperplasia with nocturia    Acquired hypothyroidism    Chronic combined systolic and diastolic CHF (congestive heart failure)    Coronary artery disease involving native coronary artery of native heart without angina pectoris    Embolic stroke    ESRD (end stage renal disease)   "  Nausea & vomiting    Hemoptysis    Coffee ground emesis    Severe malnutrition        Plan:   Recent vomiting and diarrhea, possible viral gastroenteritis  Acute on chronic anemia with coffee ground emesis, no obvious bleeding source by EGD 12/4/2023  Paroxysmal atrial fibrillation.  Patient's heart rate had increased with atrial fibrillation with rapid rate yesterday.  I think this is secondary to the fact that his carvedilol was held and he had a rebound effect from it.  His heart rate is currently 50 he is doing well.  At this point I recommend no further changes.  Chronic combined systolic and diastolic CHF, EF 40-45%. Compensated.   Coronary artery disease status post CABG 2001  History of embolic CVA  ESRD on hemodialysis   COPD with acute exacerbation  RBBB, chronic  Elevated HS troponin secondary to renal disease  Diabetes mellitus type II on chronic insulin        Alverto Templeton MD  12/10/23  12:32 EST

## 2023-12-10 NOTE — PLAN OF CARE
Goal Outcome Evaluation:  Plan of Care Reviewed With: patient           Outcome Evaluation: alert and oriented , RA, SB,  ate well, will cont to monitor         Problem: Adjustment to Illness (Sepsis/Septic Shock)  Goal: Optimal Coping  Outcome: Ongoing, Progressing  Intervention: Optimize Psychosocial Adjustment to Illness  Recent Flowsheet Documentation  Taken 12/10/2023 0837 by Karin Lawson RN  Supportive Measures: active listening utilized  Family/Support System Care: support provided

## 2023-12-10 NOTE — PROGRESS NOTES
Name: Carlito Mo ADMIT: 2023   : 1955  PCP: Florencia Caballero MD    MRN: [id number] LOS: 11 days   AGE/SEX: 68 y.o. male  ROOM: [facility]     Subjective   Subjective   Heart rate increased up to 150s yesterday, received IV metoprolol twice, cardiology was reconsulted.  This morning heart rate stable, patient denies palpitations, chest pain.    Review of Systems   As above  Objective   Objective   Vital Signs  Temp:  [97.3 °F (36.3 °C)-98.1 °F (36.7 °C)] 98 °F (36.7 °C)  Heart Rate:  [] 70  Resp:  [16-18] 16  BP: (100-131)/(53-65) 131/65  SpO2:  [99 %-100 %] 99 %  on  Flow (L/min):  [6] 6;   Device (Oxygen Therapy): room air  Body mass index is 23.16 kg/m².  Physical Exam    General: Alert, laying in bed, not in distress, chronically ill-appearing, flat affect  HEENT: Normocephalic, atraumatic  CV: Irregular rhythm, normal rate  Lungs: Clear to auscultation bilaterally, no wheezing  Abdomen: Soft, nontender, nondistended  Extremities: No significant peripheral edema , no cyanosis     Results Review     I reviewed the patient's new clinical results.  Results from last 7 days   Lab Units 12/10/23  03523   WBC 10*3/mm3  --  8.21  --  8.91 5.78 8.80   HEMOGLOBIN g/dL 8.5* 8.2* 8.3* 8.1* 7.8* 7.8*   PLATELETS 10*3/mm3  --  322  --  296 257 262     Results from last 7 days   Lab Units 23  03323   SODIUM mmol/L 139 137 138 138   POTASSIUM mmol/L 4.8 4.5 4.5 5.0   CHLORIDE mmol/L 105 104 105 106   CO2 mmol/L 24.8 24.6 25.9 27.0   BUN mg/dL 38* 29* 20 39*   CREATININE mg/dL 2.76* 3.07* 2.22* 3.22*   GLUCOSE mg/dL 96 88 116* 87   Estimated Creatinine Clearance: 23.6 mL/min (A) (by C-G formula based on SCr of 2.76 mg/dL (H)).  Results from last 7 days   Lab Units 23  0331 23  0357 23  0349 23  0318   ALBUMIN g/dL 2.8* 2.9* 2.5* 2.8*     Results from last 7  days   Lab Units 12/09/23  0331 12/07/23  0357 12/06/23  0349 12/05/23  0318 12/04/23  0259   CALCIUM mg/dL 8.3* 8.4* 8.1* 8.5* 8.2*   ALBUMIN g/dL 2.8* 2.9* 2.5* 2.8* 2.8*   MAGNESIUM mg/dL  --   --   --   --  1.8   PHOSPHORUS mg/dL 2.8 3.1 2.9 2.9 2.2*           COVID19   Date Value Ref Range Status   05/02/2023 Not Detected Not Detected - Ref. Range Final   03/07/2023 Not Detected Not Detected - Ref. Range Final     Glucose   Date/Time Value Ref Range Status   12/10/2023 0640 85 70 - 130 mg/dL Final   12/09/2023 2110 131 (H) 70 - 130 mg/dL Final   12/09/2023 1637 158 (H) 70 - 130 mg/dL Final   12/09/2023 1231 87 70 - 130 mg/dL Final   12/09/2023 0632 74 70 - 130 mg/dL Final   12/08/2023 2133 149 (H) 70 - 130 mg/dL Final   12/08/2023 1649 127 70 - 130 mg/dL Final           XR Chest 1 View  Narrative: SINGLE VIEW OF THE CHEST     HISTORY: Shortness of breath     COMPARISON: November 2030 is 23     FINDINGS:  The patient status post median sternotomy with coronary artery bypass  grafting. There is cardiomegaly. There is calcification of the aorta. No  pneumothorax or pleural effusion is seen. No definite acute infiltrates  are identified.     Impression: No acute findings.     This report was finalized on 11/28/2023 9:51 PM by Dr. Alice Allen M.D on Workstation: [id number]       Scheduled Medications  apixaban, 2.5 mg, Oral, Q12H  budesonide-formoterol, 2 puff, Inhalation, BID - RT  carvedilol, 6.25 mg, Oral, BID With Meals  cetirizine, 10 mg, Oral, Daily  insulin glargine, 2 Units, Subcutaneous, Nightly  insulin regular, 2-7 Units, Subcutaneous, 4x Daily AC & at Bedtime  levothyroxine, 75 mcg, Oral, Q AM  pantoprazole, 40 mg, Oral, BID AC  rosuvastatin, 10 mg, Oral, Daily  sodium bicarbonate, 650 mg, Oral, TID  sodium chloride, 500 mL, Intravenous, Once  sodium chloride, 10 mL, Intravenous, Q12H  sodium chloride, 10 mL, Intravenous, Q12H    Infusions     Diet  Diet: Diabetic Diets; Consistent Carbohydrate;  Texture: Regular Texture (IDDSI 7); Fluid Consistency: Thin (IDDSI 0)    I have personally reviewed     [x]  Laboratory   []  Microbiology   []  Radiology   []  EKG/Telemetry  []  Cardiology/Vascular   []  Pathology    []  Records       Assessment/Plan     Active Hospital Problems    Diagnosis  POA    **Atrial fibrillation with rapid ventricular response [I48.91]  Yes    Severe malnutrition [E43]  Yes    ESRD (end stage renal disease) [N18.6]  Yes    Nausea & vomiting [R11.2]  Yes    Hemoptysis [R04.2]  Yes    Coffee ground emesis [K92.0]  Unknown    Embolic stroke [I63.9]  Yes    Coronary artery disease involving native coronary artery of native heart without angina pectoris [I25.10]  Yes    Chronic combined systolic and diastolic CHF (congestive heart failure) [I50.42]  Yes    Acquired hypothyroidism [E03.9]  Yes    Benign prostatic hyperplasia with nocturia [N40.1, R35.1]  Yes    Type 2 diabetes mellitus, with long-term current use of insulin [E11.9, Z79.4]  Not Applicable      Resolved Hospital Problems   No resolved problems to display.       68 y.o. male admitted with Atrial fibrillation with rapid ventricular response.    GI bleeding/coffee ground emesis/diarrhea-  -possible viral gastroenteritis  -GI evaluated, status post EGD 12/04/2023 which showed hiatal hernia and LA grade B esophagitis.  Biopsies consistent with chronic inflammation without evidence of intestinal metaplasia or H. pylori   -Status post IV PPI continue p.o. pantoprazole 40 mg twice daily  -Eliquis resumed at lower dose 2.5 mg twice daily 12/04.  Cardiology recommended to continue low-dose follow-up couple weeks, and transition outpatient to 5 mg twice daily if hemoglobin remains stable and no further episodes of bleeding.  -Hemoglobin stable          Afib with rvr  -s/p diltiazem drip. On low dose coreg, eliquis as above.  -Will need follow-up outpatient in 2 weeks.    -Heart rate increased up to 150s on 12/09/2023, received IV metoprolol  twice.  -Heart rate stable this morning.  Cardiology was reconsulted.    Elevated troponin due to ESRD and type 2 NSTEMI from rvr-no plans for ischemic workup at this time.    Metabolic encephalopathy-resolved    Coronary artery disease s/p CABG in 2001-statin/bb. Asa on hold,  on Eliquis.    Chronic combined systolic and diastolic heart failure-bb.    ESRD on HD T, TH, Sa-nephrology is managing dialysis    Anemia of chronic disease-on HAI as an outpatient    Type 2 diabetes-A1c 6.9. He is on a very low dose of glargine as an outpatient. Continue sliding scale.    COPD with chronic respiratory failure with hypoxia on home O2-symbicort    Hypothyroidism-synthroid    History of embolic CVA-Eliquis 2.5 p.o. twice daily      DVT prophylax: Eliquis  Full code.  Discussed with patient.  Discussed with CCP  Anticipate discharge to SNU, pending pre-CERT, medically stable to be discharged.  Likely on Monday.      Copied text in this note has been reviewed and is accurate as of 12/10/23.         Dictated utilizing Dragon dictation        Aria Red MD  [facility]  12/10/23  10:31 EST

## 2023-12-11 VITALS
TEMPERATURE: 98 F | HEIGHT: 66 IN | SYSTOLIC BLOOD PRESSURE: 144 MMHG | HEART RATE: 88 BPM | OXYGEN SATURATION: 97 % | RESPIRATION RATE: 18 BRPM | BODY MASS INDEX: 23.07 KG/M2 | DIASTOLIC BLOOD PRESSURE: 67 MMHG | WEIGHT: 143.52 LBS

## 2023-12-11 PROBLEM — I50.22 CHRONIC HFREF (HEART FAILURE WITH REDUCED EJECTION FRACTION): Status: ACTIVE | Noted: 2023-12-11

## 2023-12-11 LAB
GLUCOSE BLDC GLUCOMTR-MCNC: 108 MG/DL (ref 70–130)
GLUCOSE BLDC GLUCOMTR-MCNC: 93 MG/DL (ref 70–130)
QT INTERVAL: 474 MS
QTC INTERVAL: 450 MS

## 2023-12-11 PROCEDURE — 82948 REAGENT STRIP/BLOOD GLUCOSE: CPT

## 2023-12-11 PROCEDURE — 93005 ELECTROCARDIOGRAM TRACING: CPT | Performed by: STUDENT IN AN ORGANIZED HEALTH CARE EDUCATION/TRAINING PROGRAM

## 2023-12-11 PROCEDURE — 93010 ELECTROCARDIOGRAM REPORT: CPT | Performed by: INTERNAL MEDICINE

## 2023-12-11 PROCEDURE — 99232 SBSQ HOSP IP/OBS MODERATE 35: CPT | Performed by: INTERNAL MEDICINE

## 2023-12-11 RX ORDER — ROSUVASTATIN CALCIUM 10 MG/1
10 TABLET, COATED ORAL DAILY
Start: 2023-12-12

## 2023-12-11 RX ORDER — AMOXICILLIN 250 MG
2 CAPSULE ORAL 2 TIMES DAILY
Start: 2023-12-11

## 2023-12-11 RX ORDER — MANNITOL 250 MG/ML
12.5 INJECTION, SOLUTION INTRAVENOUS AS NEEDED
Status: CANCELLED | OUTPATIENT
Start: 2023-12-12 | End: 2023-12-12

## 2023-12-11 RX ORDER — BISACODYL 10 MG
10 SUPPOSITORY, RECTAL RECTAL ONCE
Status: COMPLETED | OUTPATIENT
Start: 2023-12-11 | End: 2023-12-11

## 2023-12-11 RX ORDER — POLYETHYLENE GLYCOL 3350 17 G/17G
17 POWDER, FOR SOLUTION ORAL DAILY
Status: DISCONTINUED | OUTPATIENT
Start: 2023-12-11 | End: 2023-12-11 | Stop reason: HOSPADM

## 2023-12-11 RX ORDER — BISACODYL 10 MG
10 SUPPOSITORY, RECTAL RECTAL DAILY PRN
Start: 2023-12-11

## 2023-12-11 RX ORDER — PANTOPRAZOLE SODIUM 40 MG/1
40 TABLET, DELAYED RELEASE ORAL
Start: 2023-12-11

## 2023-12-11 RX ORDER — BISACODYL 10 MG
10 SUPPOSITORY, RECTAL RECTAL ONCE
Status: DISCONTINUED | OUTPATIENT
Start: 2023-12-11 | End: 2023-12-11

## 2023-12-11 RX ORDER — AMOXICILLIN 250 MG
2 CAPSULE ORAL 2 TIMES DAILY
Status: DISCONTINUED | OUTPATIENT
Start: 2023-12-11 | End: 2023-12-11 | Stop reason: HOSPADM

## 2023-12-11 RX ORDER — POLYETHYLENE GLYCOL 3350 17 G/17G
17 POWDER, FOR SOLUTION ORAL DAILY PRN
Start: 2023-12-11

## 2023-12-11 RX ADMIN — Medication 10 ML: at 08:32

## 2023-12-11 RX ADMIN — CARVEDILOL 6.25 MG: 6.25 TABLET, FILM COATED ORAL at 16:11

## 2023-12-11 RX ADMIN — SODIUM BICARBONATE 650 MG: 650 TABLET, ORALLY DISINTEGRATING ORAL at 08:29

## 2023-12-11 RX ADMIN — POLYETHYLENE GLYCOL 3350 17 G: 17 POWDER, FOR SOLUTION ORAL at 12:30

## 2023-12-11 RX ADMIN — BISACODYL 10 MG: 10 SUPPOSITORY RECTAL at 12:30

## 2023-12-11 RX ADMIN — SODIUM BICARBONATE 650 MG: 650 TABLET, ORALLY DISINTEGRATING ORAL at 16:11

## 2023-12-11 RX ADMIN — CETIRIZINE HYDROCHLORIDE 10 MG: 10 TABLET ORAL at 08:29

## 2023-12-11 RX ADMIN — Medication 10 ML: at 08:30

## 2023-12-11 RX ADMIN — APIXABAN 2.5 MG: 2.5 TABLET, FILM COATED ORAL at 08:30

## 2023-12-11 RX ADMIN — CARVEDILOL 6.25 MG: 6.25 TABLET, FILM COATED ORAL at 08:30

## 2023-12-11 RX ADMIN — LEVOTHYROXINE SODIUM 75 MCG: 75 TABLET ORAL at 08:29

## 2023-12-11 RX ADMIN — DOCUSATE SODIUM 50MG AND SENNOSIDES 8.6MG 2 TABLET: 8.6; 5 TABLET, FILM COATED ORAL at 12:30

## 2023-12-11 RX ADMIN — PANTOPRAZOLE SODIUM 40 MG: 40 TABLET, DELAYED RELEASE ORAL at 16:11

## 2023-12-11 RX ADMIN — ROSUVASTATIN CALCIUM 10 MG: 10 TABLET, FILM COATED ORAL at 08:29

## 2023-12-11 RX ADMIN — PANTOPRAZOLE SODIUM 40 MG: 40 TABLET, DELAYED RELEASE ORAL at 08:29

## 2023-12-11 NOTE — CASE MANAGEMENT/SOCIAL WORK
Continued Stay Note  [facility]     Patient Name: Carlito Mo  MRN: [id number]  Today's Date: 12/11/2023    Admit Date: 11/28/2023    Plan: [facility] via [employer] [facility] at 5pm   Discharge Plan       Row Name 12/11/23 1350       Plan    Plan [facility] via [employer] [facility] at 5pm    Patient/Family in Agreement with Plan yes    Plan Comments Jean-Paul/Noni notified CCP bed available at HD setup complete with TTS schedule with treatment tomorrow morning at 11:15. MD's and RN notified. [employer] transport arranged for 5pm today, confirmation #[id number], cost $80. MD Shimon and RN notified of transport time. Packet given to RN. Alejandra BENZ/CCP    Final Discharge Disposition Code 03 - skilled nursing facility (SNF)    Final Note [facility] via [employer] [facility] at 5pm. No additional CCP needs.                   Discharge Codes    No documentation.                 Expected Discharge Date and Time       Expected Discharge Date Expected Discharge Time    Dec 11, 2023               Marivel Hernandez, RN

## 2023-12-11 NOTE — CASE MANAGEMENT/SOCIAL WORK
Case Management Discharge Note      Final Note: [facility] via [employer] [facility] at 5pm. No additional CCP needs.    Provided Post Acute Provider List?: Yes  Post Acute Provider List: Nursing Home  Delivered To: Support Person  Method of Delivery: In person    Selected Continued Care - Admitted Since 11/28/2023       Destination Coordination complete.      Service Provider Selected Services Address Phone Fax Patient Preferred    [facility] [place] [id number] [facility] [phone] --              Durable Medical Equipment    No services have been selected for the patient.                Dialysis/Infusion    No services have been selected for the patient.                Home Medical Care    No services have been selected for the patient.                Therapy    No services have been selected for the patient.                Community Resources    No services have been selected for the patient.                Community & DME    No services have been selected for the patient.                         Final Discharge Disposition Code: 03 - skilled nursing facility (SNF)

## 2023-12-11 NOTE — PROGRESS NOTES
[facility] Progress Note      Patient Name: Carlito Mo  : 1955  MRN: [id number]  Primary Care Physician:  Florencia Caballero MD  Date of admission: 2023    Subjective     Interval History:   Follow-up ESRD.      Rapid atrial fibrillation yesterday following HD; requiring IV beta-blocker  Denies palpitations  Appetite is fair; no N/V  Breathing is comfortable on room air      Review of Systems:   As noted above    Objective     Vitals:   Temp:  [97.3 °F (36.3 °C)-98.1 °F (36.7 °C)] 97.3 °F (36.3 °C)  Heart Rate:  [54-74] 67  Resp:  [16-18] 18  BP: (112-144)/(54-65) 143/65    Intake/Output Summary (Last 24 hours) at 12/10/2023 2045  Last data filed at 12/10/2023 1600  Gross per 24 hour   Intake 360 ml   Output 900 ml   Net -540 ml       Physical Exam:    General: Alert, chronically ill, frail, NAD, gaunt  Psychiatric: Flat affect and depressed mood  Skin: warm and dry  HEENT: Mucosa dry, nonicteric sclera; temporal wasting  Neck: supple, no JVD  Lungs clear to auscultation bilaterally, unlabored breathing effort  Heart: Irregular irregular, not tachycardic, no rub  Abdomen: soft, nontender, nondistended. +bs   Extremities: no significant edema.  Right upper extremity AV fistula patent    Scheduled Meds:     apixaban, 2.5 mg, Oral, Q12H  budesonide-formoterol, 2 puff, Inhalation, BID - RT  carvedilol, 6.25 mg, Oral, BID With Meals  cetirizine, 10 mg, Oral, Daily  insulin glargine, 2 Units, Subcutaneous, Nightly  insulin regular, 2-7 Units, Subcutaneous, 4x Daily AC & at Bedtime  levothyroxine, 75 mcg, Oral, Q AM  pantoprazole, 40 mg, Oral, BID AC  rosuvastatin, 10 mg, Oral, Daily  sodium bicarbonate, 650 mg, Oral, TID  sodium chloride, 500 mL, Intravenous, Once  sodium chloride, 10 mL, Intravenous, Q12H  sodium chloride, 10 mL, Intravenous, Q12H      IV Meds:          Results Reviewed:   I have personally reviewed the results from the time of this admission to 12/10/2023  20:45 EST     Results from last 7 days   Lab Units 12/09/23 0331 12/07/23 0357 12/06/23 0349   SODIUM mmol/L 139 137 138   POTASSIUM mmol/L 4.8 4.5 4.5   CHLORIDE mmol/L 105 104 105   CO2 mmol/L 24.8 24.6 25.9   BUN mg/dL 38* 29* 20   CREATININE mg/dL 2.76* 3.07* 2.22*   CALCIUM mg/dL 8.3* 8.4* 8.1*   GLUCOSE mg/dL 96 88 116*       Estimated Creatinine Clearance: 23.6 mL/min (A) (by C-G formula based on SCr of 2.76 mg/dL (H)).    Results from last 7 days   Lab Units 12/09/23 0331 12/07/23 0357 12/06/23 0349 12/05/23 0318 12/04/23 0259   MAGNESIUM mg/dL  --   --   --   --  1.8   PHOSPHORUS mg/dL 2.8 3.1 2.9   < > 2.2*    < > = values in this interval not displayed.             Results from last 7 days   Lab Units 12/10/23  0351 12/09/23 0332 12/08/23 0316 12/07/23 0357 12/06/23 0349 12/05/23 0318 12/04/23 0259   WBC 10*3/mm3  --  8.21  --  8.91 5.78 8.80 9.56   HEMOGLOBIN g/dL 8.5* 8.2* 8.3* 8.1* 7.8* 7.8* 8.0*   PLATELETS 10*3/mm3  --  322  --  296 257 262 249               Assessment / Plan     ASSESSMENT:  1.  ESRD.  Improving volume excess, and stable electrolytes.  HD planned at [facility], though schedule is not yet clear.  Dialyzing here on TTS schedule  2.  Paroxysmal A-fib with rapid ventricular response.  On Coreg and Eliquis (lower dose).  3.  GI bleed with coffee ground emesis.  Esophagitis on EGD.  Chronic inflammation.  On pantoprazole  4.  Type II NSTEMI.  5.  Chronic combined systolic and diastolic heart failure.  Removing volume with dialysis as blood pressure allows..  6.  Anemia of chronic kidney disease.  On HAI as outpatient  7.  Hypothyroidism on replacement.  8.  History of embolic CVA  9.  DM2 with renal complication    PLAN:  Continue HD on TTS schedule for now, but will transition accordingly if rehab center has different schedule planned   Encouraged him to eat  Hopefully to nursing home soon  Will decrease frequency of chemistries    I reviewed the patient's chart,  labs, and other providers' notes  Thank you for involving us in the care of Carlito Mo.  Please feel free to call with any questions.    Ramakrishna Blake MD  12/10/23  20:45 [facility]    [facility]  [phone]    Please note that portions of this note were completed with a voice recognition program.

## 2023-12-11 NOTE — PLAN OF CARE
Goal Outcome Evaluation:  Plan of Care Reviewed With: patient           Outcome Evaluation: [place] [facility]. Wheelchair van scheduled for 1700. Voicemail left for wife notifing her of d/c. Report called to Kavya.

## 2023-12-11 NOTE — PROGRESS NOTES
[facility] Progress Note      Patient Name: Carlito Mo  : 1955  MRN: [id number]  Primary Care Physician:  Florencia Caballero MD  Date of admission: 2023    Subjective     Interval History:   Follow-up ESRD.      Patient is feeling the same, denies any chest pain or shortness of air, no orthopnea or PND, no nausea or vomiting, no palpitation        Review of Systems:   As noted above    Objective     Vitals:   Temp:  [97.3 °F (36.3 °C)-98.2 °F (36.8 °C)] 98.2 °F (36.8 °C)  Heart Rate:  [54-69] 63  Resp:  [16-18] 18  BP: (115-144)/(54-77) 120/77    Intake/Output Summary (Last 24 hours) at 2023 0952  Last data filed at 2023 0000  Gross per 24 hour   Intake 240 ml   Output 850 ml   Net -610 ml       Physical Exam:    General: Alert, chronically ill, frail, NAD, gaunt  Psychiatric: Flat affect and depressed mood  Skin: warm and dry  HEENT: Mucosa dry, nonicteric sclera; temporal wasting  Neck: supple, no JVD  Lungs clear to auscultation bilaterally, unlabored breathing effort  Heart: Irregular irregular, not tachycardic, no rub  Abdomen: soft, nontender, nondistended. +bs   Extremities: no significant edema.  Right upper extremity AV fistula patent    Scheduled Meds:     apixaban, 2.5 mg, Oral, Q12H  budesonide-formoterol, 2 puff, Inhalation, BID - RT  carvedilol, 6.25 mg, Oral, BID With Meals  cetirizine, 10 mg, Oral, Daily  insulin glargine, 2 Units, Subcutaneous, Nightly  insulin regular, 2-7 Units, Subcutaneous, 4x Daily AC & at Bedtime  levothyroxine, 75 mcg, Oral, Q AM  pantoprazole, 40 mg, Oral, BID AC  rosuvastatin, 10 mg, Oral, Daily  sodium bicarbonate, 650 mg, Oral, TID  sodium chloride, 500 mL, Intravenous, Once  sodium chloride, 10 mL, Intravenous, Q12H  sodium chloride, 10 mL, Intravenous, Q12H      IV Meds:          Results Reviewed:   I have personally reviewed the results from the time of this admission to 2023 09:52 EST     Results from last  7 days   Lab Units 12/09/23  0331 12/07/23 0357 12/06/23 0349   SODIUM mmol/L 139 137 138   POTASSIUM mmol/L 4.8 4.5 4.5   CHLORIDE mmol/L 105 104 105   CO2 mmol/L 24.8 24.6 25.9   BUN mg/dL 38* 29* 20   CREATININE mg/dL 2.76* 3.07* 2.22*   CALCIUM mg/dL 8.3* 8.4* 8.1*   GLUCOSE mg/dL 96 88 116*       Estimated Creatinine Clearance: 23.6 mL/min (A) (by C-G formula based on SCr of 2.76 mg/dL (H)).    Results from last 7 days   Lab Units 12/09/23  0331 12/07/23 0357 12/06/23 0349   PHOSPHORUS mg/dL 2.8 3.1 2.9             Results from last 7 days   Lab Units 12/10/23  0351 12/09/23  0332 12/08/23 0316 12/07/23 0357 12/06/23 0349 12/05/23  0318   WBC 10*3/mm3  --  8.21  --  8.91 5.78 8.80   HEMOGLOBIN g/dL 8.5* 8.2* 8.3* 8.1* 7.8* 7.8*   PLATELETS 10*3/mm3  --  322  --  296 257 262               Assessment / Plan     ASSESSMENT:  1.  ESRD.  Improving volume excess, and stable electrolytes.  HD planned at [facility], though schedule is not yet clear.  Dialyzing here on TTS schedule  2.  Paroxysmal A-fib with rapid ventricular response.  On Coreg and Eliquis (lower dose).  3.  GI bleed with coffee ground emesis.  Esophagitis on EGD.  Chronic inflammation.  On pantoprazole  4.  Type II NSTEMI.  5.  Chronic combined systolic and diastolic heart failure.  Removing volume with dialysis as blood pressure allows..  6.  Anemia of chronic kidney disease.  On HAI as outpatient  7.  Hypothyroidism on replacement.  8.  History of embolic CVA  9.  DM2 with renal complication    PLAN:  Continue HD on TTS schedule for now, but will transition accordingly if rehab center has different schedule planned   Surveillance labs      I reviewed the patient's chart, labs, and other providers' notes  Copied text in this note has been reviewed and is accurate as of 12/11/23.     Thank you for involving us in the care of Carlito Mo.  Please feel free to call with any questions.    Cameron Olguin MD  12/11/23  09:52  EST    [facility]  [phone]    Please note that portions of this note were completed with a voice recognition program.

## 2023-12-11 NOTE — DISCHARGE SUMMARY
Patient Name: Carlito Mo  : 1955  MRN: [id number]    Date of Admission: 2023  Date of Discharge:  2023  Primary Care Physician: Florencia Caballero MD      Chief Complaint:   Nausea (NV dislysis pt, seen here 48hr ago for same s/s. BG>300.)      Discharge Diagnoses     Active Hospital Problems    Diagnosis  POA    **Atrial fibrillation with rapid ventricular response [I48.91]  Yes    Chronic HFrEF (heart failure with reduced ejection fraction) [I50.22]  Yes    Severe malnutrition [E43]  Yes    ESRD (end stage renal disease) [N18.6]  Yes    Nausea & vomiting [R11.2]  Yes    Hemoptysis [R04.2]  Yes    Coffee ground emesis [K92.0]  Unknown    Embolic stroke [I63.9]  Yes    Coronary artery disease involving native coronary artery of native heart without angina pectoris [I25.10]  Yes    Chronic combined systolic and diastolic CHF (congestive heart failure) [I50.42]  Yes    Acquired hypothyroidism [E03.9]  Yes    Benign prostatic hyperplasia with nocturia [N40.1, R35.1]  Yes    Type 2 diabetes mellitus, with long-term current use of insulin [E11.9, Z79.4]  Not Applicable      Resolved Hospital Problems   No resolved problems to display.        Hospital Course     68-year-old man who originally presented with nausea and vomiting as well as some coffee-ground emesis.      GI bleeding/coffee ground emesis/diarrhea-  possible viral gastroenteritis.-GI evaluated, status post EGD 2023 which showed hiatal hernia and LA grade B esophagitis.  Biopsies consistent with chronic inflammation without evidence of intestinal metaplasia or H. Pylori.Status post IV PPI continue p.o. pantoprazole 40 mg twice daily-Eliquis resumed at lower dose 2.5 mg twice daily .  Cardiology recommended to continue low-dose follow-up couple weeks, and transition outpatient to 5 mg twice daily if hemoglobin remains stable and no further episodes of bleeding.  Hemoglobin was gradually trended up prior to discharge, was 8.5.   CBC in 5 to 7 days to monitor hemoglobin.             Atrial fibrillation with RVR: Heart rate was controlled prior to discharge, was in sinus rhythm.  Continue low-dose carvedilol 6.25 mg twice daily, low-dose Eliquis 2.5 mg twice daily follow-up with cardiology in 2 weeks, may increase Eliquis back to 500 twice daily if hemoglobin remains stable with no further signs of bleeding.           Coronary artery disease s/p CABG in 2001- continue rosuvastatin, decreased rosuvastatin dose to 10 mg daily secondary to ESRD.  Continue carvedilol 6.5 p.o. twice daily., Eliquis as above.  Holding aspirin, resume as indicated per cardiology if hemoglobin stable.          ESRD on HD T, TH, Sa-nephrology is managing dialysis-outpatient t dialysis set up.     Anemia of chronic disease-on HAI as an outpatient          At the time of discharge patient was told to take all medications as prescribed, keep all follow-up appointments, and call their doctor or return to the hospital with any worsening or concerning symptoms.                  Day of Discharge     Subjective:  Laying in bed, no changes.  Reports he did not sleep well.  Denies complaints.  Converted to sinus rhythm.    Physical Exam:  Temp:  [97.3 °F (36.3 °C)-98.2 °F (36.8 °C)] 98.2 °F (36.8 °C)  Heart Rate:  [54-69] 63  Resp:  [16-18] 18  BP: (115-144)/(54-77) 120/77  Body mass index is 23.16 kg/m².  Physical Exam    General: Alert, laying in bed, chronically ill-appearing, flat affect,  HEENT: Normocephalic, atraumatic  CV: Regular rhythm, normal rate  Lungs: CTA, no wheezing  Abdomen: Soft, nontender, nondistended  Extremities: No significant peripheral edema , right upper extremity AV fistula in place      Consultants     Consult Orders (all) (From admission, onward)       Start     Ordered    12/09/23 1553  Inpatient Cardiology Consult  Once        Specialty:  Cardiology  Provider:  Mauricio Corona MD    12/09/23 3170    12/05/23 1183  Inpatient Case Management   Consult  Once        Provider:  (Not yet assigned)    12/05/23 0329    11/30/23 1319  Inpatient Hospitalist Consult  Once        Specialty:  Hospitalist  Provider:  Delmar Arevalo MD    11/30/23 1318    11/29/23 0402  Inpatient Case Management  Consult  Once        Provider:  (Not yet assigned)    11/29/23 0401    11/29/23 0401  Inpatient Gastroenterology Consult  Once        Specialty:  Gastroenterology  Provider:  Yesenia Blancas MD    11/29/23 0400    11/29/23 0055  Pulmonology (on-call MD unless specified)  Once        Specialty:  Pulmonary Disease  Provider:  Erich García MD    11/29/23 0054    11/29/23 0047  Inpatient Nephrology Consult  Once        Specialty:  Nephrology  Provider:  Ayad Bangura MD    11/29/23 0049    11/29/23 0044  Inpatient Cardiology Consult  Once        Specialty:  Cardiology  Provider:  Mariano Garcia MD    11/29/23 0049    11/29/23 0023  [facility] (on-call MD unless specified)  Once        Specialty:  Cardiology  Provider:  Mariano Garcia MD    11/29/23 0022    11/29/23 0020  [facility] (on-call MD unless specified) Details  Once        Specialty:  Hospitalist  Provider:  (Not yet assigned)    11/29/23 0019                  Procedures     ESOPHAGOGASTRODUODENOSCOPY with bx      Imaging Results (All)       Procedure Component Value Units Date/Time    XR Chest 1 View [[id number]] Collected: 11/28/23 2150     Updated: 11/28/23 2154    Narrative:      SINGLE VIEW OF THE CHEST     HISTORY: Shortness of breath     COMPARISON: November 2030 is 23     FINDINGS:  The patient status post median sternotomy with coronary artery bypass  grafting. There is cardiomegaly. There is calcification of the aorta. No  pneumothorax or pleural effusion is seen. No definite acute infiltrates  are identified.       Impression:      No acute findings.     This report was finalized on 11/28/2023 9:51 PM by Dr. Alice Allen M.D on Workstation: [id number]              Results for orders placed during the hospital encounter of 09/15/22    Duplex Carotid Ultrasound CAR    Interpretation Summary  · Right internal carotid artery mild stenosis.  · Left internal carotid artery mild stenosis.    Results for orders placed during the hospital encounter of 03/06/23    Adult Transthoracic Echo Complete W/ Cont if Necessary Per Protocol    Interpretation Summary    There is akinesis of the mid to distal inferoseptum, distal inferior wall, and inferior apex    Left ventricular ejection fraction appears to be 41 - 45%.    Left ventricular diastolic function is consistent with (grade III w/high LAP) fixed restrictive pattern.    The right ventricular cavity is mildly dilated. Normal right ventricular systolic function noted.    The left atrial cavity is moderately dilated.    Mild tricuspid valve regurgitation is present.    Calculated right ventricular systolic pressure from tricuspid regurgitation is 59 mmHg.    The inferior vena cava is dilated. IVC inspiratory collapse is absent.    There is no evidence of pericardial effusion.    Pertinent Labs     Results from last 7 days   Lab Units 12/10/23  0351 12/09/23  0332 12/08/23  0316 12/07/23  0357 12/06/23  0349 12/05/23  0318   WBC 10*3/mm3  --  8.21  --  8.91 5.78 8.80   HEMOGLOBIN g/dL 8.5* 8.2* 8.3* 8.1* 7.8* 7.8*   PLATELETS 10*3/mm3  --  322  --  296 257 262     Results from last 7 days   Lab Units 12/09/23 0331 12/07/23 0357 12/06/23 0349 12/05/23 0318   SODIUM mmol/L 139 137 138 138   POTASSIUM mmol/L 4.8 4.5 4.5 5.0   CHLORIDE mmol/L 105 104 105 106   CO2 mmol/L 24.8 24.6 25.9 27.0   BUN mg/dL 38* 29* 20 39*   CREATININE mg/dL 2.76* 3.07* 2.22* 3.22*   GLUCOSE mg/dL 96 88 116* 87   Estimated Creatinine Clearance: 23.6 mL/min (A) (by C-G formula based on SCr of 2.76 mg/dL (H)).  Results from last 7 days   Lab Units 12/09/23 0331 12/07/23 0357 12/06/23 0349 12/05/23 0318   ALBUMIN g/dL 2.8* 2.9* 2.5* 2.8*     Results from  "last 7 days   Lab Units 12/09/23  0331 12/07/23  0357 12/06/23  0349 12/05/23  0318   CALCIUM mg/dL 8.3* 8.4* 8.1* 8.5*   ALBUMIN g/dL 2.8* 2.9* 2.5* 2.8*   PHOSPHORUS mg/dL 2.8 3.1 2.9 2.9               Invalid input(s): \"LDLCALC\"          Test Results Pending at Discharge       Discharge Details        Discharge Medications        New Medications        Instructions Start Date   bisacodyl 10 MG suppository  Commonly known as: DULCOLAX   10 mg, Rectal, Daily PRN      pantoprazole 40 MG EC tablet  Commonly known as: PROTONIX   40 mg, Oral, 2 Times Daily Before Meals      polyethylene glycol 17 g packet  Commonly known as: MIRALAX   17 g, Oral, Daily PRN      sennosides-docusate 8.6-50 MG per tablet  Commonly known as: PERICOLACE   2 tablets, Oral, 2 Times Daily, Hold for loose stools.             Changes to Medications        Instructions Start Date   apixaban 2.5 MG tablet tablet  Commonly known as: ELIQUIS  What changed:   medication strength  how much to take  when to take this   2.5 mg, Oral, Every 12 Hours Scheduled      rosuvastatin 10 MG tablet  Commonly known as: CRESTOR  What changed:   medication strength  how much to take   10 mg, Oral, Daily   Start Date: December 12, 2023            Continue These Medications        Instructions Start Date   acetaminophen 325 MG tablet  Commonly known as: TYLENOL   650 mg, Oral, Every 4 Hours PRN      Basaglar Tempo Pen 100 UNIT/ML solution pen-injector  Generic drug: Insulin Glargine w/ Trans Port   2 Units, Subcutaneous, Nightly      budesonide-formoterol 160-4.5 MCG/ACT inhaler  Commonly known as: SYMBICORT   2 puffs, Inhalation, 2 Times Daily - RT      buPROPion  MG 24 hr tablet  Commonly known as: WELLBUTRIN XL   150 mg, Oral, Daily      carvedilol 6.25 MG tablet  Commonly known as: COREG   6.25 mg, Oral, 2 Times Daily With Meals      famotidine 10 MG tablet  Commonly known as: PEPCID   10 mg, Oral, 2 Times Daily PRN      ferrous sulfate 325 (65 FE) MG " tablet   325 mg, Oral, Daily With Breakfast      ipratropium-albuterol  MCG/ACT inhaler  Commonly known as: COMBIVENT RESPIMAT   1 puff, Inhalation, 4 Times Daily PRN      levothyroxine 75 MCG tablet  Commonly known as: SYNTHROID, LEVOTHROID   75 mcg, Oral, Daily      ondansetron ODT 4 MG disintegrating tablet  Commonly known as: ZOFRAN-ODT   4 mg, Translingual, Every 8 Hours PRN      sodium bicarbonate 650 MG tablet   650 mg, Oral, 3 Times Daily      tamsulosin 0.4 MG capsule 24 hr capsule  Commonly known as: FLOMAX   1 capsule, Oral, Daily             Stop These Medications      amLODIPine 5 MG tablet  Commonly known as: NORVASC     aspirin 81 MG EC tablet     hydrALAZINE 25 MG tablet  Commonly known as: APRESOLINE     torsemide 100 MG tablet  Commonly known as: DEMADEX              Allergies   Allergen Reactions    Prozac [Fluoxetine Hcl] Other (See Comments)     shaking       Discharge Disposition:  Skilled Nursing Facility (DC - External)      Discharge Diet:  Diet Order   Procedures    Diet: Diabetic Diets; Consistent Carbohydrate; Texture: Regular Texture (IDDSI 7); Fluid Consistency: Thin (IDDSI 0)       Discharge Activity:       CODE STATUS:    Code Status and Medical Interventions:   Ordered at: 11/29/23 0404     Code Status (Patient has no pulse and is not breathing):    CPR (Attempt to Resuscitate)     Medical Interventions (Patient has pulse or is breathing):    Full Support       Future Appointments   Date Time Provider Department Center   12/20/2023 11:30 AM Nida Lopes APRN [facility]   2/1/2024  3:00 PM Kae Bunch APRN [facility]      Contact information for follow-up providers       Florencia Caballero MD Follow up in 1 week(s).    Specialty: Internal Medicine  Contact information:  [address] [facility] [id number] [phone]               Noemy Rea APRN. Schedule an appointment as soon as possible for a visit in 2 week(s).    Specialty:  Cardiology  Contact information:  [address]  [facility] [address]  [facility] [address]  [phone]                       Contact information for after-discharge care       Destination       [facility] .    Service: Skilled Nursing  Contact information:  [address] [facility] [address] [phone]                                   Time Spent on Discharge:  Greater than 30 minutes      Aria Red MD  [facility]  12/11/23  11:36 EST

## 2023-12-11 NOTE — PROGRESS NOTES
"[facility]    Patient Name: Carlito Mo  :1955  68 y.o.  LOS: 12  Encounter Provider: Klever Wei Jr, MD      Patient Care Team:  Florencia Caballero MD as PCP - General (Internal Medicine)  Amber Murguia MD as Consulting Physician (Cardiology)  Sera La BRYAN as Pharmacist  OSeth Conte MD as Surgeon (General Surgery)  Kim Hoyos MD PhD as Consulting Physician (Hematology and Oncology)  Jerome Diallo MD as Referring Physician (Family Medicine)  Jose Enrique Louie MD as Consulting Physician (Gastroenterology)  Nima Huddleston MD as Consulting Physician (Nephrology)    Chief Complaint: Follow-up PAF, chronic right bundle branch block, CAD status post CABG without angina, chronic combined systolic and diastolic heart failure, ESRD on HD    Interval History: No acute issues overnight.       Objective   Vital Signs  Temp:  [97.3 °F (36.3 °C)-98.2 °F (36.8 °C)] 98 °F (36.7 °C)  Heart Rate:  [56-88] 88  Resp:  [18] 18  BP: (115-144)/(57-77) 144/67    Intake/Output Summary (Last 24 hours) at 2023 1538  Last data filed at 2023 1515  Gross per 24 hour   Intake 120 ml   Output 1250 ml   Net -1130 ml     Flowsheet Rows      Flowsheet Row First Filed Value   Admission Height 167.6 cm (66\") Documented at 2023 0615   Admission Weight 61.3 kg (135 lb 2.3 oz) Documented at 2023 0340              Vitals reviewed.   Constitutional:       Appearance: Not in distress. Frail. Chronically ill-appearing.   Neck:      Vascular: No JVR. JVD normal.   Pulmonary:      Effort: Pulmonary effort is normal.      Breath sounds: Wheezing present. No rhonchi. No rales.   Chest:      Chest wall: Not tender to palpatation.   Cardiovascular:      PMI at left midclavicular line. Normal rate. Regular rhythm. Normal S1. Normal S2.       Murmurs: There is no murmur.      No gallop.  No click. No rub.   Pulses:     Intact distal pulses.   Edema:     Peripheral edema " "absent.   Abdominal:      General: Bowel sounds are normal.      Palpations: Abdomen is soft.      Tenderness: There is no abdominal tenderness.   Musculoskeletal: Normal range of motion.         General: No tenderness. Skin:     General: Skin is warm and dry.   Neurological:      General: No focal deficit present.      Mental Status: Alert and oriented to person, place and time.           Pertinent Test Results:  Results from last 7 days   Lab Units 12/09/23  0331 12/07/23  0357 12/06/23 0349 12/05/23  0318   SODIUM mmol/L 139 137 138 138   POTASSIUM mmol/L 4.8 4.5 4.5 5.0   CHLORIDE mmol/L 105 104 105 106   CO2 mmol/L 24.8 24.6 25.9 27.0   BUN mg/dL 38* 29* 20 39*   CREATININE mg/dL 2.76* 3.07* 2.22* 3.22*   GLUCOSE mg/dL 96 88 116* 87   CALCIUM mg/dL 8.3* 8.4* 8.1* 8.5*         Results from last 7 days   Lab Units 12/10/23  0351 12/09/23  0332 12/08/23 0316 12/07/23 0357 12/06/23 0349 12/05/23  0318   WBC 10*3/mm3  --  8.21  --  8.91 5.78 8.80   HEMOGLOBIN g/dL 8.5* 8.2* 8.3* 8.1* 7.8* 7.8*   HEMATOCRIT % 27.2* 27.2* 26.8* 26.4* 25.7* 26.0*   PLATELETS 10*3/mm3  --  322  --  296 257 262                   Invalid input(s): \"LDLCALC\"                Medication Review:   apixaban, 2.5 mg, Oral, Q12H  budesonide-formoterol, 2 puff, Inhalation, BID - RT  carvedilol, 6.25 mg, Oral, BID With Meals  cetirizine, 10 mg, Oral, Daily  insulin glargine, 2 Units, Subcutaneous, Nightly  insulin regular, 2-7 Units, Subcutaneous, 4x Daily AC & at Bedtime  levothyroxine, 75 mcg, Oral, Q AM  pantoprazole, 40 mg, Oral, BID AC  polyethylene glycol, 17 g, Oral, Daily  rosuvastatin, 10 mg, Oral, Daily  senna-docusate sodium, 2 tablet, Oral, BID  sodium bicarbonate, 650 mg, Oral, TID  sodium chloride, 500 mL, Intravenous, Once  sodium chloride, 10 mL, Intravenous, Q12H  sodium chloride, 10 mL, Intravenous, Q12H              Assessment & Plan     Active Hospital Problems    Diagnosis  POA    **Atrial fibrillation with rapid " ventricular response [I48.91]  Yes    Chronic HFrEF (heart failure with reduced ejection fraction) [I50.22]  Yes    Severe malnutrition [E43]  Yes    ESRD (end stage renal disease) [N18.6]  Yes    Nausea & vomiting [R11.2]  Yes    Hemoptysis [R04.2]  Yes    Coffee ground emesis [K92.0]  Unknown    Embolic stroke [I63.9]  Yes    Coronary artery disease involving native coronary artery of native heart without angina pectoris [I25.10]  Yes    Chronic combined systolic and diastolic CHF (congestive heart failure) [I50.42]  Yes    Acquired hypothyroidism [E03.9]  Yes    Benign prostatic hyperplasia with nocturia [N40.1, R35.1]  Yes    Type 2 diabetes mellitus, with long-term current use of insulin [E11.9, Z79.4]  Not Applicable      Resolved Hospital Problems   No resolved problems to display.        Paroxysmal atrial fibrillation -rate has been well-controlled on current regimen.  Continue low-dose apixaban.  Acute on chronic anemia -no clear bleeding source on EGD 12/4/2023.  Chronic combined systolic and diastolic heart failure -euvolemic on exam today.  He was on high-dose torsemide as an outpatient.  Currently on hemodialysis with nephrology following.  CAD status post CABG with no angina  History of embolic CVA  ESRD on HD  COPD  Chronic right bundle branch block  Diabetes    Cardiovascular issues seem stable.  Patient is stable for transfer to skilled nursing facility from cardiovascular standpoint.      Klever Wei Jr, MD  [facility]  12/11/23  15:38 EST

## 2024-01-03 ENCOUNTER — HOSPITAL ENCOUNTER (INPATIENT)
Facility: HOSPITAL | Age: 69
LOS: 6 days | Discharge: NURSING, REHAB OR LONG-TERM CARE | End: 2024-01-11
Attending: EMERGENCY MEDICINE | Admitting: INTERNAL MEDICINE
Payer: MEDICARE

## 2024-01-03 DIAGNOSIS — R55 SYNCOPE, UNSPECIFIED SYNCOPE TYPE: Primary | ICD-10-CM

## 2024-01-03 DIAGNOSIS — N18.6 ESRD (END STAGE RENAL DISEASE): ICD-10-CM

## 2024-01-03 LAB
BASOPHILS # BLD AUTO: 0.01 10*3/MM3 (ref 0–0.2)
BASOPHILS NFR BLD AUTO: 0.2 % (ref 0–1.5)
DEPRECATED RDW RBC AUTO: 48.7 FL (ref 37–54)
EOSINOPHIL # BLD AUTO: 0.01 10*3/MM3 (ref 0–0.4)
EOSINOPHIL NFR BLD AUTO: 0.2 % (ref 0.3–6.2)
ERYTHROCYTE [DISTWIDTH] IN BLOOD BY AUTOMATED COUNT: 15.9 % (ref 12.3–15.4)
HCT VFR BLD AUTO: 38.2 % (ref 37.5–51)
HGB BLD-MCNC: 11.4 G/DL (ref 13–17.7)
IMM GRANULOCYTES # BLD AUTO: 0.01 10*3/MM3 (ref 0–0.05)
IMM GRANULOCYTES NFR BLD AUTO: 0.2 % (ref 0–0.5)
LYMPHOCYTES # BLD AUTO: 0.78 10*3/MM3 (ref 0.7–3.1)
LYMPHOCYTES NFR BLD AUTO: 15.6 % (ref 19.6–45.3)
MCH RBC QN AUTO: 25.8 PG (ref 26.6–33)
MCHC RBC AUTO-ENTMCNC: 29.8 G/DL (ref 31.5–35.7)
MCV RBC AUTO: 86.4 FL (ref 79–97)
MONOCYTES # BLD AUTO: 0.38 10*3/MM3 (ref 0.1–0.9)
MONOCYTES NFR BLD AUTO: 7.6 % (ref 5–12)
NEUTROPHILS NFR BLD AUTO: 3.82 10*3/MM3 (ref 1.7–7)
NEUTROPHILS NFR BLD AUTO: 76.2 % (ref 42.7–76)
NRBC BLD AUTO-RTO: 0 /100 WBC (ref 0–0.2)
PLATELET # BLD AUTO: 314 10*3/MM3 (ref 140–450)
PMV BLD AUTO: 9.9 FL (ref 6–12)
RBC # BLD AUTO: 4.42 10*6/MM3 (ref 4.14–5.8)
TROPONIN T SERPL HS-MCNC: 179 NG/L
WBC NRBC COR # BLD AUTO: 5.01 10*3/MM3 (ref 3.4–10.8)

## 2024-01-03 PROCEDURE — 80053 COMPREHEN METABOLIC PANEL: CPT | Performed by: EMERGENCY MEDICINE

## 2024-01-03 PROCEDURE — 85730 THROMBOPLASTIN TIME PARTIAL: CPT | Performed by: EMERGENCY MEDICINE

## 2024-01-03 PROCEDURE — 84484 ASSAY OF TROPONIN QUANT: CPT | Performed by: EMERGENCY MEDICINE

## 2024-01-03 PROCEDURE — 99285 EMERGENCY DEPT VISIT HI MDM: CPT

## 2024-01-03 PROCEDURE — 93010 ELECTROCARDIOGRAM REPORT: CPT | Performed by: INTERNAL MEDICINE

## 2024-01-03 PROCEDURE — 85025 COMPLETE CBC W/AUTO DIFF WBC: CPT | Performed by: EMERGENCY MEDICINE

## 2024-01-03 PROCEDURE — 85610 PROTHROMBIN TIME: CPT | Performed by: EMERGENCY MEDICINE

## 2024-01-03 PROCEDURE — 93005 ELECTROCARDIOGRAM TRACING: CPT | Performed by: EMERGENCY MEDICINE

## 2024-01-03 PROCEDURE — 93005 ELECTROCARDIOGRAM TRACING: CPT

## 2024-01-03 PROCEDURE — 83735 ASSAY OF MAGNESIUM: CPT | Performed by: EMERGENCY MEDICINE

## 2024-01-03 RX ORDER — SODIUM CHLORIDE 0.9 % (FLUSH) 0.9 %
10 SYRINGE (ML) INJECTION AS NEEDED
Status: DISCONTINUED | OUTPATIENT
Start: 2024-01-03 | End: 2024-01-11 | Stop reason: HOSPADM

## 2024-01-04 ENCOUNTER — APPOINTMENT (OUTPATIENT)
Dept: CT IMAGING | Facility: HOSPITAL | Age: 69
End: 2024-01-04
Payer: MEDICARE

## 2024-01-04 ENCOUNTER — APPOINTMENT (OUTPATIENT)
Dept: GENERAL RADIOLOGY | Facility: HOSPITAL | Age: 69
End: 2024-01-04
Payer: MEDICARE

## 2024-01-04 PROBLEM — R93.89 ABNORMAL CT OF THE CHEST: Status: ACTIVE | Noted: 2024-01-04

## 2024-01-04 PROBLEM — R55 SYNCOPE: Status: ACTIVE | Noted: 2024-01-04

## 2024-01-04 LAB
ALBUMIN SERPL-MCNC: 4.3 G/DL (ref 3.5–5.2)
ALBUMIN/GLOB SERPL: 1.2 G/DL
ALP SERPL-CCNC: 98 U/L (ref 39–117)
ALT SERPL W P-5'-P-CCNC: 33 U/L (ref 1–41)
ANION GAP SERPL CALCULATED.3IONS-SCNC: 17 MMOL/L (ref 5–15)
ANION GAP SERPL CALCULATED.3IONS-SCNC: 22.4 MMOL/L (ref 5–15)
APTT PPP: 31.2 SECONDS (ref 22.7–35.4)
AST SERPL-CCNC: 52 U/L (ref 1–40)
B PARAPERT DNA SPEC QL NAA+PROBE: NOT DETECTED
B PERT DNA SPEC QL NAA+PROBE: NOT DETECTED
BASOPHILS # BLD AUTO: 0.01 10*3/MM3 (ref 0–0.2)
BASOPHILS NFR BLD AUTO: 0.2 % (ref 0–1.5)
BILIRUB SERPL-MCNC: 0.4 MG/DL (ref 0–1.2)
BUN SERPL-MCNC: 40 MG/DL (ref 8–23)
BUN SERPL-MCNC: 44 MG/DL (ref 8–23)
BUN/CREAT SERPL: 8.6 (ref 7–25)
BUN/CREAT SERPL: 9.1 (ref 7–25)
C PNEUM DNA NPH QL NAA+NON-PROBE: NOT DETECTED
CALCIUM SPEC-SCNC: 9.5 MG/DL (ref 8.6–10.5)
CALCIUM SPEC-SCNC: 9.8 MG/DL (ref 8.6–10.5)
CHLORIDE SERPL-SCNC: 101 MMOL/L (ref 98–107)
CHLORIDE SERPL-SCNC: 98 MMOL/L (ref 98–107)
CO2 SERPL-SCNC: 21.6 MMOL/L (ref 22–29)
CO2 SERPL-SCNC: 27 MMOL/L (ref 22–29)
CREAT SERPL-MCNC: 4.64 MG/DL (ref 0.76–1.27)
CREAT SERPL-MCNC: 4.85 MG/DL (ref 0.76–1.27)
DEPRECATED RDW RBC AUTO: 51.9 FL (ref 37–54)
EGFRCR SERPLBLD CKD-EPI 2021: 12.3 ML/MIN/1.73
EGFRCR SERPLBLD CKD-EPI 2021: 13 ML/MIN/1.73
EOSINOPHIL # BLD AUTO: 0.01 10*3/MM3 (ref 0–0.4)
EOSINOPHIL NFR BLD AUTO: 0.2 % (ref 0.3–6.2)
ERYTHROCYTE [DISTWIDTH] IN BLOOD BY AUTOMATED COUNT: 16.2 % (ref 12.3–15.4)
FLUAV SUBTYP SPEC NAA+PROBE: NOT DETECTED
FLUBV RNA ISLT QL NAA+PROBE: NOT DETECTED
GEN 5 2HR TROPONIN T REFLEX: 175 NG/L
GLOBULIN UR ELPH-MCNC: 3.5 GM/DL
GLUCOSE BLDC GLUCOMTR-MCNC: 128 MG/DL (ref 70–130)
GLUCOSE BLDC GLUCOMTR-MCNC: 185 MG/DL (ref 70–130)
GLUCOSE BLDC GLUCOMTR-MCNC: 231 MG/DL (ref 70–130)
GLUCOSE SERPL-MCNC: 147 MG/DL (ref 65–99)
GLUCOSE SERPL-MCNC: 173 MG/DL (ref 65–99)
HADV DNA SPEC NAA+PROBE: NOT DETECTED
HCOV 229E RNA SPEC QL NAA+PROBE: NOT DETECTED
HCOV HKU1 RNA SPEC QL NAA+PROBE: NOT DETECTED
HCOV NL63 RNA SPEC QL NAA+PROBE: NOT DETECTED
HCOV OC43 RNA SPEC QL NAA+PROBE: NOT DETECTED
HCT VFR BLD AUTO: 35.5 % (ref 37.5–51)
HGB BLD-MCNC: 10.9 G/DL (ref 13–17.7)
HMPV RNA NPH QL NAA+NON-PROBE: NOT DETECTED
HOLD SPECIMEN: NORMAL
HOLD SPECIMEN: NORMAL
HPIV1 RNA ISLT QL NAA+PROBE: NOT DETECTED
HPIV2 RNA SPEC QL NAA+PROBE: NOT DETECTED
HPIV3 RNA NPH QL NAA+PROBE: NOT DETECTED
HPIV4 P GENE NPH QL NAA+PROBE: NOT DETECTED
IMM GRANULOCYTES # BLD AUTO: 0.01 10*3/MM3 (ref 0–0.05)
IMM GRANULOCYTES NFR BLD AUTO: 0.2 % (ref 0–0.5)
INR PPP: 1.54 (ref 0.9–1.1)
INR PPP: 1.54 (ref 0.9–1.1)
LYMPHOCYTES # BLD AUTO: 0.7 10*3/MM3 (ref 0.7–3.1)
LYMPHOCYTES NFR BLD AUTO: 16.7 % (ref 19.6–45.3)
M PNEUMO IGG SER IA-ACNC: NOT DETECTED
MAGNESIUM SERPL-MCNC: 2.4 MG/DL (ref 1.6–2.4)
MCH RBC QN AUTO: 26.8 PG (ref 26.6–33)
MCHC RBC AUTO-ENTMCNC: 30.7 G/DL (ref 31.5–35.7)
MCV RBC AUTO: 87.4 FL (ref 79–97)
MONOCYTES # BLD AUTO: 0.36 10*3/MM3 (ref 0.1–0.9)
MONOCYTES NFR BLD AUTO: 8.6 % (ref 5–12)
NEUTROPHILS NFR BLD AUTO: 3.09 10*3/MM3 (ref 1.7–7)
NEUTROPHILS NFR BLD AUTO: 74.1 % (ref 42.7–76)
NRBC BLD AUTO-RTO: 0 /100 WBC (ref 0–0.2)
PLATELET # BLD AUTO: 287 10*3/MM3 (ref 140–450)
PMV BLD AUTO: 10.4 FL (ref 6–12)
POTASSIUM SERPL-SCNC: 4.3 MMOL/L (ref 3.5–5.2)
POTASSIUM SERPL-SCNC: 4.9 MMOL/L (ref 3.5–5.2)
PROT SERPL-MCNC: 7.8 G/DL (ref 6–8.5)
PROTHROMBIN TIME: 18.7 SECONDS (ref 11.7–14.2)
PROTHROMBIN TIME: 18.8 SECONDS (ref 11.7–14.2)
QT INTERVAL: 424 MS
QTC INTERVAL: 480 MS
RBC # BLD AUTO: 4.06 10*6/MM3 (ref 4.14–5.8)
RHINOVIRUS RNA SPEC NAA+PROBE: NOT DETECTED
RSV RNA NPH QL NAA+NON-PROBE: NOT DETECTED
SARS-COV-2 RNA NPH QL NAA+NON-PROBE: NOT DETECTED
SODIUM SERPL-SCNC: 142 MMOL/L (ref 136–145)
SODIUM SERPL-SCNC: 145 MMOL/L (ref 136–145)
TROPONIN T DELTA: -4 NG/L
TSH SERPL DL<=0.05 MIU/L-ACNC: 1.58 UIU/ML (ref 0.27–4.2)
WBC NRBC COR # BLD AUTO: 4.18 10*3/MM3 (ref 3.4–10.8)
WHOLE BLOOD HOLD COAG: NORMAL
WHOLE BLOOD HOLD SPECIMEN: NORMAL

## 2024-01-04 PROCEDURE — 63710000001 INSULIN LISPRO (HUMAN) PER 5 UNITS: Performed by: NURSE PRACTITIONER

## 2024-01-04 PROCEDURE — 71250 CT THORAX DX C-: CPT

## 2024-01-04 PROCEDURE — 71045 X-RAY EXAM CHEST 1 VIEW: CPT

## 2024-01-04 PROCEDURE — 94640 AIRWAY INHALATION TREATMENT: CPT

## 2024-01-04 PROCEDURE — 94664 DEMO&/EVAL PT USE INHALER: CPT

## 2024-01-04 PROCEDURE — 25810000003 SODIUM CHLORIDE 0.9 % SOLUTION: Performed by: HOSPITALIST

## 2024-01-04 PROCEDURE — 70450 CT HEAD/BRAIN W/O DYE: CPT

## 2024-01-04 PROCEDURE — 84443 ASSAY THYROID STIM HORMONE: CPT | Performed by: NURSE PRACTITIONER

## 2024-01-04 PROCEDURE — 85610 PROTHROMBIN TIME: CPT | Performed by: NURSE PRACTITIONER

## 2024-01-04 PROCEDURE — 82948 REAGENT STRIP/BLOOD GLUCOSE: CPT

## 2024-01-04 PROCEDURE — 99214 OFFICE O/P EST MOD 30 MIN: CPT | Performed by: INTERNAL MEDICINE

## 2024-01-04 PROCEDURE — G0378 HOSPITAL OBSERVATION PER HR: HCPCS

## 2024-01-04 PROCEDURE — 63710000001 INSULIN GLARGINE PER 5 UNITS: Performed by: NURSE PRACTITIONER

## 2024-01-04 PROCEDURE — 85025 COMPLETE CBC W/AUTO DIFF WBC: CPT | Performed by: NURSE PRACTITIONER

## 2024-01-04 PROCEDURE — 80048 BASIC METABOLIC PNL TOTAL CA: CPT | Performed by: NURSE PRACTITIONER

## 2024-01-04 PROCEDURE — 84484 ASSAY OF TROPONIN QUANT: CPT | Performed by: EMERGENCY MEDICINE

## 2024-01-04 PROCEDURE — 0202U NFCT DS 22 TRGT SARS-COV-2: CPT | Performed by: EMERGENCY MEDICINE

## 2024-01-04 RX ORDER — PANTOPRAZOLE SODIUM 40 MG/1
40 TABLET, DELAYED RELEASE ORAL
Status: DISCONTINUED | OUTPATIENT
Start: 2024-01-04 | End: 2024-01-11 | Stop reason: HOSPADM

## 2024-01-04 RX ORDER — INSULIN GLARGINE 100 [IU]/ML
2 INJECTION, SOLUTION SUBCUTANEOUS NIGHTLY
Status: DISCONTINUED | OUTPATIENT
Start: 2024-01-04 | End: 2024-01-09

## 2024-01-04 RX ORDER — CARVEDILOL 6.25 MG/1
6.25 TABLET ORAL 2 TIMES DAILY WITH MEALS
Status: DISCONTINUED | OUTPATIENT
Start: 2024-01-04 | End: 2024-01-04

## 2024-01-04 RX ORDER — POLYETHYLENE GLYCOL 3350 17 G/17G
17 POWDER, FOR SOLUTION ORAL DAILY PRN
Status: DISCONTINUED | OUTPATIENT
Start: 2024-01-04 | End: 2024-01-09

## 2024-01-04 RX ORDER — ROSUVASTATIN CALCIUM 10 MG/1
10 TABLET, COATED ORAL DAILY
Status: DISCONTINUED | OUTPATIENT
Start: 2024-01-04 | End: 2024-01-11 | Stop reason: HOSPADM

## 2024-01-04 RX ORDER — BISACODYL 5 MG/1
5 TABLET, DELAYED RELEASE ORAL DAILY PRN
Status: DISCONTINUED | OUTPATIENT
Start: 2024-01-04 | End: 2024-01-09

## 2024-01-04 RX ORDER — BUDESONIDE AND FORMOTEROL FUMARATE DIHYDRATE 160; 4.5 UG/1; UG/1
2 AEROSOL RESPIRATORY (INHALATION)
Status: DISCONTINUED | OUTPATIENT
Start: 2024-01-04 | End: 2024-01-11 | Stop reason: HOSPADM

## 2024-01-04 RX ORDER — ACETAMINOPHEN 650 MG/1
650 SUPPOSITORY RECTAL EVERY 4 HOURS PRN
Status: DISCONTINUED | OUTPATIENT
Start: 2024-01-04 | End: 2024-01-11 | Stop reason: HOSPADM

## 2024-01-04 RX ORDER — NICOTINE POLACRILEX 4 MG
15 LOZENGE BUCCAL
Status: DISCONTINUED | OUTPATIENT
Start: 2024-01-04 | End: 2024-01-11 | Stop reason: HOSPADM

## 2024-01-04 RX ORDER — SODIUM CHLORIDE 0.9 % (FLUSH) 0.9 %
10 SYRINGE (ML) INJECTION EVERY 12 HOURS SCHEDULED
Status: DISCONTINUED | OUTPATIENT
Start: 2024-01-04 | End: 2024-01-11 | Stop reason: HOSPADM

## 2024-01-04 RX ORDER — DEXTROSE MONOHYDRATE 25 G/50ML
25 INJECTION, SOLUTION INTRAVENOUS
Status: DISCONTINUED | OUTPATIENT
Start: 2024-01-04 | End: 2024-01-11 | Stop reason: HOSPADM

## 2024-01-04 RX ORDER — BUPROPION HYDROCHLORIDE 150 MG/1
150 TABLET ORAL DAILY
Status: DISCONTINUED | OUTPATIENT
Start: 2024-01-04 | End: 2024-01-11 | Stop reason: HOSPADM

## 2024-01-04 RX ORDER — LEVOTHYROXINE SODIUM 0.07 MG/1
75 TABLET ORAL DAILY
Status: DISCONTINUED | OUTPATIENT
Start: 2024-01-04 | End: 2024-01-11 | Stop reason: HOSPADM

## 2024-01-04 RX ORDER — CARVEDILOL 3.12 MG/1
3.12 TABLET ORAL 2 TIMES DAILY WITH MEALS
Status: DISCONTINUED | OUTPATIENT
Start: 2024-01-05 | End: 2024-01-10

## 2024-01-04 RX ORDER — TAMSULOSIN HYDROCHLORIDE 0.4 MG/1
0.4 CAPSULE ORAL DAILY
Status: DISCONTINUED | OUTPATIENT
Start: 2024-01-04 | End: 2024-01-09

## 2024-01-04 RX ORDER — INSULIN LISPRO 100 [IU]/ML
2-7 INJECTION, SOLUTION INTRAVENOUS; SUBCUTANEOUS
Status: DISCONTINUED | OUTPATIENT
Start: 2024-01-04 | End: 2024-01-11 | Stop reason: HOSPADM

## 2024-01-04 RX ORDER — SODIUM CHLORIDE 0.9 % (FLUSH) 0.9 %
10 SYRINGE (ML) INJECTION AS NEEDED
Status: DISCONTINUED | OUTPATIENT
Start: 2024-01-04 | End: 2024-01-11 | Stop reason: HOSPADM

## 2024-01-04 RX ORDER — SODIUM CHLORIDE 9 MG/ML
40 INJECTION, SOLUTION INTRAVENOUS AS NEEDED
Status: DISCONTINUED | OUTPATIENT
Start: 2024-01-04 | End: 2024-01-11 | Stop reason: HOSPADM

## 2024-01-04 RX ORDER — ONDANSETRON 2 MG/ML
4 INJECTION INTRAMUSCULAR; INTRAVENOUS EVERY 6 HOURS PRN
Status: DISCONTINUED | OUTPATIENT
Start: 2024-01-04 | End: 2024-01-11 | Stop reason: HOSPADM

## 2024-01-04 RX ORDER — BISACODYL 10 MG
10 SUPPOSITORY, RECTAL RECTAL DAILY PRN
Status: DISCONTINUED | OUTPATIENT
Start: 2024-01-04 | End: 2024-01-09

## 2024-01-04 RX ORDER — ACETAMINOPHEN 160 MG/5ML
650 SOLUTION ORAL EVERY 4 HOURS PRN
Status: DISCONTINUED | OUTPATIENT
Start: 2024-01-04 | End: 2024-01-11 | Stop reason: HOSPADM

## 2024-01-04 RX ORDER — AMOXICILLIN 250 MG
2 CAPSULE ORAL 2 TIMES DAILY
Status: DISCONTINUED | OUTPATIENT
Start: 2024-01-04 | End: 2024-01-09

## 2024-01-04 RX ORDER — NITROGLYCERIN 0.4 MG/1
0.4 TABLET SUBLINGUAL
Status: DISCONTINUED | OUTPATIENT
Start: 2024-01-04 | End: 2024-01-11 | Stop reason: HOSPADM

## 2024-01-04 RX ORDER — IBUPROFEN 600 MG/1
1 TABLET ORAL
Status: DISCONTINUED | OUTPATIENT
Start: 2024-01-04 | End: 2024-01-11 | Stop reason: HOSPADM

## 2024-01-04 RX ORDER — ACETAMINOPHEN 325 MG/1
650 TABLET ORAL EVERY 4 HOURS PRN
Status: DISCONTINUED | OUTPATIENT
Start: 2024-01-04 | End: 2024-01-11 | Stop reason: HOSPADM

## 2024-01-04 RX ORDER — FAMOTIDINE 20 MG/1
10 TABLET, FILM COATED ORAL 2 TIMES DAILY PRN
Status: DISCONTINUED | OUTPATIENT
Start: 2024-01-04 | End: 2024-01-11 | Stop reason: HOSPADM

## 2024-01-04 RX ADMIN — APIXABAN 2.5 MG: 2.5 TABLET, FILM COATED ORAL at 20:43

## 2024-01-04 RX ADMIN — BUDESONIDE AND FORMOTEROL FUMARATE DIHYDRATE 2 PUFF: 160; 4.5 AEROSOL RESPIRATORY (INHALATION) at 19:48

## 2024-01-04 RX ADMIN — INSULIN LISPRO 3 UNITS: 100 INJECTION, SOLUTION INTRAVENOUS; SUBCUTANEOUS at 12:09

## 2024-01-04 RX ADMIN — INSULIN GLARGINE 2 UNITS: 100 INJECTION, SOLUTION SUBCUTANEOUS at 20:44

## 2024-01-04 RX ADMIN — PANTOPRAZOLE SODIUM 40 MG: 40 TABLET, DELAYED RELEASE ORAL at 17:05

## 2024-01-04 RX ADMIN — CARVEDILOL 6.25 MG: 6.25 TABLET, FILM COATED ORAL at 12:09

## 2024-01-04 RX ADMIN — SODIUM CHLORIDE 1000 ML: 9 INJECTION, SOLUTION INTRAVENOUS at 18:33

## 2024-01-04 RX ADMIN — Medication 10 ML: at 08:43

## 2024-01-04 RX ADMIN — Medication 10 ML: at 20:44

## 2024-01-04 RX ADMIN — ROSUVASTATIN CALCIUM 10 MG: 10 TABLET, FILM COATED ORAL at 12:09

## 2024-01-04 RX ADMIN — APIXABAN 2.5 MG: 2.5 TABLET, FILM COATED ORAL at 12:09

## 2024-01-04 RX ADMIN — CARVEDILOL 6.25 MG: 6.25 TABLET, FILM COATED ORAL at 17:05

## 2024-01-04 RX ADMIN — INSULIN LISPRO 2 UNITS: 100 INJECTION, SOLUTION INTRAVENOUS; SUBCUTANEOUS at 17:05

## 2024-01-04 RX ADMIN — BUPROPION HYDROCHLORIDE 150 MG: 150 TABLET, EXTENDED RELEASE ORAL at 12:09

## 2024-01-04 RX ADMIN — TAMSULOSIN HYDROCHLORIDE 0.4 MG: 0.4 CAPSULE ORAL at 12:09

## 2024-01-04 NOTE — PLAN OF CARE
Goal Outcome Evaluation:              Outcome Evaluation: Admitted from ER s/p syncopal episode at home. Currently AF on tele, all other VSS, on RA. Meds resumed per MD. Cardiology and nephrology consults pending. No orders to resume dialysis at this time. No c/o pain or SOA, no more episodes of syncope or near-syncope. Will continue with current POC and update as needed.

## 2024-01-04 NOTE — H&P
Patient Name:  Carlito Mo  YOB: 1955  MRN:  [id number]  Admit Date:  1/3/2024  Patient Care Team:  Florencia Caballero MD as PCP - General (Internal Medicine)  Amber Murguia MD as Consulting Physician (Cardiology)  [facility] as Pharmacist  Seth Ladd MD as Surgeon (General Surgery)  Kim Hoyos MD PhD as Consulting Physician (Hematology and Oncology)  Jerome Diallo MD as Referring Physician (Family Medicine)  Jose Enrique Louie MD as Consulting Physician (Gastroenterology)  Nima Huddleston MD as Consulting Physician (Nephrology)      Subjective   History Present Illness     Chief Complaint   Patient presents with    Syncope    Shortness of Breath       Mr. Mo is a 68 y.o. male non-smoker with a history of multiple medical issues, including but not limited to, ESRD on HD, hypothyroidism, Afib, CHF, CAD, anemia, DM, HTN, hx stroke that presents to [facility] complaining of syncopal event. He had dialysis yesterday without complications. He was using bathroom at home afterwards, felt somewhat dizzy and when he stood, sustained a syncopal event. Denies chest pain, palpitations. Denies recent illness such as fever, n/v/d, dysuria. Denies soa, productive cough. Denies previous episodes of syncope. He has been on dialysis for ~ 2-3 months. Afebrile in ED. HR/BP acceptable. Hgb 11.4, no leukocytosis. Mg 2.4. Gluc 173, BUN/Cr 40/4.64, CO2 21.6, Anion gap 22.4, AST 52. HS trop 179 w/neg 4 delta. RVP neg. CXR: no acute findings. CT chest: areas of tree-in-bud nodularity w/I lungs mostly lower lobes and RANDI w/all lobes involved, findings favored to be related to chronic or atypical infection. CTH: no acute findings. EKG reported as rate increase otherwise no change      Review of Systems   Constitutional:  Positive for fatigue. Negative for chills and fever.   HENT:  Negative for congestion.    Respiratory:  Negative for cough and shortness of breath.     Cardiovascular:  Negative for chest pain and palpitations.   Gastrointestinal:  Negative for diarrhea, nausea and vomiting.   Genitourinary:  Negative for dysuria.   Musculoskeletal:  Negative for arthralgias.   Skin:  Negative for rash.   Neurological:  Positive for dizziness and syncope.   Psychiatric/Behavioral:  Negative for sleep disturbance.         Personal History     Past Medical History:   Diagnosis Date    Acquired hypothyroidism     Acute congestive heart failure     Acute respiratory failure with hypoxia     Acute sinusitis     SYLVAIN (acute kidney injury)     on CKD    Anemia     Arthritis     CAD (coronary artery disease)     Cancer     skin    Chronic combined systolic and diastolic congestive heart failure     Chronic ischemic heart disease     Chronic kidney disease (CKD)     CKD (chronic kidney disease)     COPD (chronic obstructive pulmonary disease)     Depression     Diabetes mellitus type 2, uncontrolled, without complications     Diabetic neuropathy     Disease of thyroid gland     Elevated cholesterol     Fatigue     Frequent PVCs     Generalized weakness     GERD (gastroesophageal reflux disease)     Heart attack     History of coronary artery disease     with remote history of bypass surgery in 2001    Hyperlipidemia     Hypertension     Hypertensive encephalopathy     Mental status change     Acute    Nausea & vomiting 12/01/2023    NO DEVICE/RECALLED     Pleural effusion     Pneumonia     Poor historian     RBBB (right bundle branch block)     Stroke     POOR VISION    TIA (transient ischemic attack)     Type 2 diabetes mellitus     Urinary retention     Vitamin D deficiency      Past Surgical History:   Procedure Laterality Date    APPENDECTOMY N/A 02/14/2016    Dr. Alexey Dodson    ARTERIOVENOUS FISTULA/SHUNT SURGERY Right 06/03/2022    Procedure: RIGHT FOREARM ARTERIOVENOUS FISTULA PLACEMENT RADIOCEPHALIC WITH CEPHALIC VEIN TRANSPOSITION;  Surgeon: Eliseo Willis MD;  Location:   [facility];  Service: Vascular;  Laterality: Right;    COLONOSCOPY      over 20 years ago.  no polyps     COLONOSCOPY N/A 2018    Procedure: COLONOSCOPY;  Surgeon: Jose Enrique Louie MD;  Location:  [facility];  Service: Gastroenterology    COLONOSCOPY N/A 2018    IH, EH, polyps (TAs w/low grade dysplasia)    COLONOSCOPY N/A 2020    Procedure: COLONOSCOPY;  Surgeon: Jose Enrique Louie MD;  [facility];  Service: Gastroenterology;  Laterality: N/A;  Pre op-Anemia, Melena, History of Polyps  Post op-To Cecum/TI, Polyp, Poor Prep    CORONARY ARTERY BYPASS GRAFT  2001    ENDOSCOPY N/A 2020    Procedure: ESOPHAGOGASTRODUODENOSCOPY with biopsies;  Surgeon: Amanda Lovelace MD;  Location:  [facility];  Service: Gastroenterology;  Laterality: N/A;  pre-anemia, dark stools  post-esophagitis, hiatal hernia    ENDOSCOPY N/A 09/15/2020    Procedure: ESOPHAGOGASTRODUODENOSCOPY WITH BIOPSIES;  Surgeon: Jose Enrique Louie MD;  Location: [facility]COPY;  Service: Gastroenterology;  Laterality: N/A;  pre: history of melena and esophagitis  post: mild esophagitis and gastritis, small hiatal hernia    ENDOSCOPY N/A 2023    Procedure: ESOPHAGOGASTRODUODENOSCOPY with bx;  Surgeon: Quoc Elmore MD;  Location: [facility] ENDOSCOPY;  Service: Gastroenterology;  Laterality: N/A;  pre: Coffee-ground emesis  post: hiatal hernia, esophagitis    HERNIA REPAIR Left 2019    THORACENTESIS      TONSILLECTOMY      VASECTOMY       Family History   Problem Relation Age of Onset    Diabetes Mother     Hypertension Mother     Macular degeneration Mother     Alcohol abuse Father     Cancer Father         lung,  age 65    Heart disease Father     Alcohol abuse Brother     Cirrhosis Brother          age 50    Liver cancer Brother 45    Diabetes Son     Kidney failure Son     Autism Son     Malig Hyperthermia Neg Hx      Social History     Tobacco Use    Smoking  status: Never    Smokeless tobacco: Never    Tobacco comments:     CAFFEINE - OCCAS. SODA   Vaping Use    Vaping Use: Never used   Substance Use Topics    Alcohol use: No     Comment: last drink 2 months ago    Drug use: No     No current facility-administered medications on file prior to encounter.     Current Outpatient Medications on File Prior to Encounter   Medication Sig Dispense Refill    apixaban (ELIQUIS) 2.5 MG tablet tablet Take 1 tablet by mouth Every 12 (Twelve) Hours. Indications: Atrial Fibrillation      budesonide-formoterol (SYMBICORT) 160-4.5 MCG/ACT inhaler Inhale 2 puffs 2 (Two) Times a Day. 1 each 3    buPROPion XL (WELLBUTRIN XL) 150 MG 24 hr tablet Take 1 tablet by mouth Daily.      carvedilol (COREG) 6.25 MG tablet Take 1 tablet by mouth 2 (Two) Times a Day With Meals. 60 tablet 3    famotidine (PEPCID) 10 MG tablet Take 1 tablet by mouth 2 (Two) Times a Day As Needed for Heartburn.      Insulin Glargine w/ Trans Port (Basaglar Tempo Pen) 100 UNIT/ML solution pen-injector Inject 2 Units under the skin into the appropriate area as directed Every Night.      ipratropium-albuterol (COMBIVENT RESPIMAT)  MCG/ACT inhaler Inhale 1 puff 4 (Four) Times a Day As Needed for Wheezing.      levothyroxine (SYNTHROID, LEVOTHROID) 75 MCG tablet Take 1 tablet by mouth Daily. 30 tablet 5    pantoprazole (PROTONIX) 40 MG EC tablet Take 1 tablet by mouth 2 (Two) Times a Day Before Meals.      rosuvastatin (CRESTOR) 10 MG tablet Take 1 tablet by mouth Daily.      tamsulosin (FLOMAX) 0.4 MG capsule 24 hr capsule Take 1 capsule by mouth Daily.      acetaminophen (TYLENOL) 325 MG tablet Take 2 tablets by mouth Every 4 (Four) Hours As Needed for Mild Pain. (Patient not taking: Reported on 1/4/2024)      bisacodyl (DULCOLAX) 10 MG suppository Insert 1 suppository into the rectum Daily As Needed for Constipation. (Patient not taking: Reported on 1/4/2024)      ferrous sulfate 325 (65 FE) MG tablet Take 1 tablet  by mouth Daily With Breakfast. (Patient not taking: Reported on 1/4/2024) 30 tablet 3    ondansetron ODT (ZOFRAN-ODT) 4 MG disintegrating tablet Place 1 tablet on the tongue Every 8 (Eight) Hours As Needed for Nausea or Vomiting. (Patient not taking: Reported on 1/4/2024) 12 tablet 0    polyethylene glycol 17 g packet Take 17 g by mouth Daily As Needed (constipation). (Patient not taking: Reported on 1/4/2024)      sennosides-docusate (PERICOLACE) 8.6-50 MG per tablet Take 2 tablets by mouth 2 (Two) Times a Day. Hold for loose stools. (Patient not taking: Reported on 1/4/2024)      sodium bicarbonate 650 MG tablet Take 1 tablet by mouth 3 (Three) Times a Day. (Patient taking differently: Take 1 tablet by mouth 3 (Three) Times a Day. Pt is unsure if he's currently taking) 90 tablet 3     Allergies   Allergen Reactions    Prozac [Fluoxetine Hcl] Other (See Comments)     shaking       Objective    Objective     Vital Signs  Temp:  [98.4 °F (36.9 °C)-98.6 °F (37 °C)] 98.4 °F (36.9 °C)  Heart Rate:  [61-94] 91  Resp:  [22-26] 22  BP: (118-150)/(62-96) 150/86  SpO2:  [91 %-100 %] 99 %  on   ;   Device (Oxygen Therapy): room air  Body mass index is 20.33 kg/m².    Physical Exam  Vitals and nursing note reviewed.   Constitutional:       General: He is not in acute distress.     Appearance: He is ill-appearing. He is not toxic-appearing.      Comments: Fatigued, weak, chronically ill appearance     HENT:      Head: Normocephalic.      Mouth/Throat:      Mouth: Mucous membranes are moist.   Eyes:      Conjunctiva/sclera: Conjunctivae normal.   Cardiovascular:      Rate and Rhythm: Normal rate and regular rhythm.   Pulmonary:      Effort: Pulmonary effort is normal. No respiratory distress.      Breath sounds: Examination of the right-lower field reveals decreased breath sounds. Examination of the left-lower field reveals decreased breath sounds.   Abdominal:      General: Bowel sounds are normal.      Palpations: Abdomen is  soft.   Musculoskeletal:      Cervical back: Neck supple.      Right lower leg: No edema.      Left lower leg: No edema.   Skin:     General: Skin is warm and dry.   Neurological:      Mental Status: He is alert and oriented to person, place, and time.   Psychiatric:         Mood and Affect: Affect is flat.         Behavior: Behavior is slowed.         Results Review:  I reviewed the patient's new clinical results.  I reviewed the patient's new imaging results and agree with the interpretation.  I reviewed the patient's other test results and agree with the interpretation  I personally viewed and interpreted the patient's EKG/Telemetry data  Discussed with ED provider.    Lab Results (last 24 hours)       Procedure Component Value Units Date/Time    High Sensitivity Troponin T [[id number]]  (Abnormal) Collected: 01/03/24 2320    Specimen: Blood Updated: 01/03/24 2352     HS Troponin T 179 ng/L     Narrative:      High Sensitive Troponin T Reference Range:  <14.0 ng/L- Negative Female for AMI  <22.0 ng/L- Negative Male for AMI  >=14 - Abnormal Female indicating possible myocardial injury.  >=22 - Abnormal Male indicating possible myocardial injury.   Clinicians would have to utilize clinical acumen, EKG, Troponin, and serial changes to determine if it is an Acute Myocardial Infarction or myocardial injury due to an underlying chronic condition.         CBC & Differential [[id number]]  (Abnormal) Collected: 01/03/24 2320    Specimen: Blood Updated: 01/03/24 2353    Narrative:      The following orders were created for panel order CBC & Differential.  Procedure                               Abnormality         Status                     ---------                               -----------         ------                     CBC Auto Differential[[id number]]        Abnormal            Final result                 Please view results for these tests on the individual orders.    Comprehensive Metabolic Panel [[id number]]   (Abnormal) Collected: 01/03/24 2320    Specimen: Blood Updated: 01/04/24 0020     Glucose 173 mg/dL      BUN 40 mg/dL      Creatinine 4.64 mg/dL      Sodium 142 mmol/L      Potassium 4.9 mmol/L      Comment: Slight hemolysis detected by analyzer. Result may be falsely elevated.        Chloride 98 mmol/L      CO2 21.6 mmol/L      Calcium 9.8 mg/dL      Total Protein 7.8 g/dL      Albumin 4.3 g/dL      ALT (SGPT) 33 U/L      AST (SGOT) 52 U/L      Comment: Slight hemolysis detected by analyzer. Result may be falsely elevated.        Alkaline Phosphatase 98 U/L      Total Bilirubin 0.4 mg/dL      Globulin 3.5 gm/dL      A/G Ratio 1.2 g/dL      BUN/Creatinine Ratio 8.6     Anion Gap 22.4 mmol/L      eGFR 13.0 mL/min/1.73      Comment: <15 Indicative of kidney failure       Narrative:      GFR Normal >60  Chronic Kidney Disease <60  Kidney Failure <15      Protime-INR [[id number]]  (Abnormal) Collected: 01/03/24 2320    Specimen: Blood Updated: 01/04/24 0002     Protime 18.7 Seconds      INR 1.54    aPTT [[id number]]  (Normal) Collected: 01/03/24 2320    Specimen: Blood Updated: 01/04/24 0002     PTT 31.2 seconds     Magnesium [[id number]]  (Normal) Collected: 01/03/24 2320    Specimen: Blood Updated: 01/04/24 0020     Magnesium 2.4 mg/dL     CBC Auto Differential [[id number]]  (Abnormal) Collected: 01/03/24 2320    Specimen: Blood Updated: 01/03/24 2353     WBC 5.01 10*3/mm3      RBC 4.42 10*6/mm3      Hemoglobin 11.4 g/dL      Hematocrit 38.2 %      MCV 86.4 fL      MCH 25.8 pg      MCHC 29.8 g/dL      RDW 15.9 %      RDW-SD 48.7 fl      MPV 9.9 fL      Platelets 314 10*3/mm3      Neutrophil % 76.2 %      Lymphocyte % 15.6 %      Monocyte % 7.6 %      Eosinophil % 0.2 %      Basophil % 0.2 %      Immature Grans % 0.2 %      Neutrophils, Absolute 3.82 10*3/mm3      Lymphocytes, Absolute 0.78 10*3/mm3      Monocytes, Absolute 0.38 10*3/mm3      Eosinophils, Absolute 0.01 10*3/mm3      Basophils, Absolute 0.01 10*3/mm3       Immature Grans, Absolute 0.01 10*3/mm3      nRBC 0.0 /100 WBC     Respiratory Panel PCR w/COVID-19(SARS-CoV-2) [facility]-House, NP Swab in UTM/VTM, 2 HR TAT - Swab, Nasopharynx [[id number]]  (Normal) Collected: 01/04/24 0041    Specimen: Swab from Nasopharynx Updated: 01/04/24 0143     ADENOVIRUS, PCR Not Detected     Coronavirus 229E Not Detected     Coronavirus HKU1 Not Detected     Coronavirus NL63 Not Detected     Coronavirus OC43 Not Detected     COVID19 Not Detected     Human Metapneumovirus Not Detected     Human Rhinovirus/Enterovirus Not Detected     Influenza A PCR Not Detected     Influenza B PCR Not Detected     Parainfluenza Virus 1 Not Detected     Parainfluenza Virus 2 Not Detected     Parainfluenza Virus 3 Not Detected     Parainfluenza Virus 4 Not Detected     RSV, PCR Not Detected     Bordetella pertussis pcr Not Detected     Bordetella parapertussis PCR Not Detected     Chlamydophila pneumoniae PCR Not Detected     Mycoplasma pneumo by PCR Not Detected    Narrative:      In the setting of a positive respiratory panel with a viral infection PLUS a negative procalcitonin without other underlying concern for bacterial infection, consider observing off antibiotics or discontinuation of antibiotics and continue supportive care. If the respiratory panel is positive for atypical bacterial infection (Bordetella pertussis, Chlamydophila pneumoniae, or Mycoplasma pneumoniae), consider antibiotic de-escalation to target atypical bacterial infection.    High Sensitivity Troponin T 2Hr [[id number]]  (Abnormal) Collected: 01/04/24 0210    Specimen: Blood Updated: 01/04/24 0253     HS Troponin T 175 ng/L      Troponin T Delta -4 ng/L     Narrative:      High Sensitive Troponin T Reference Range:  <14.0 ng/L- Negative Female for AMI  <22.0 ng/L- Negative Male for AMI  >=14 - Abnormal Female indicating possible myocardial injury.  >=22 - Abnormal Male indicating possible myocardial injury.    Clinicians would have to utilize clinical acumen, EKG, Troponin, and serial changes to determine if it is an Acute Myocardial Infarction or myocardial injury due to an underlying chronic condition.         Basic Metabolic Panel [[id number]]  (Abnormal) Collected: 01/04/24 0610    Specimen: Blood Updated: 01/04/24 0637     Glucose 147 mg/dL      BUN 44 mg/dL      Creatinine 4.85 mg/dL      Sodium 145 mmol/L      Potassium 4.3 mmol/L      Chloride 101 mmol/L      CO2 27.0 mmol/L      Calcium 9.5 mg/dL      BUN/Creatinine Ratio 9.1     Anion Gap 17.0 mmol/L      eGFR 12.3 mL/min/1.73      Comment: <15 Indicative of kidney failure       Narrative:      GFR Normal >60  Chronic Kidney Disease <60  Kidney Failure <15      CBC Auto Differential [[id number]]  (Abnormal) Collected: 01/04/24 0610    Specimen: Blood Updated: 01/04/24 0630     WBC 4.18 10*3/mm3      RBC 4.06 10*6/mm3      Hemoglobin 10.9 g/dL      Hematocrit 35.5 %      MCV 87.4 fL      MCH 26.8 pg      MCHC 30.7 g/dL      RDW 16.2 %      RDW-SD 51.9 fl      MPV 10.4 fL      Platelets 287 10*3/mm3      Neutrophil % 74.1 %      Lymphocyte % 16.7 %      Monocyte % 8.6 %      Eosinophil % 0.2 %      Basophil % 0.2 %      Immature Grans % 0.2 %      Neutrophils, Absolute 3.09 10*3/mm3      Lymphocytes, Absolute 0.70 10*3/mm3      Monocytes, Absolute 0.36 10*3/mm3      Eosinophils, Absolute 0.01 10*3/mm3      Basophils, Absolute 0.01 10*3/mm3      Immature Grans, Absolute 0.01 10*3/mm3      nRBC 0.0 /100 WBC     Protime-INR [[id number]]  (Abnormal) Collected: 01/04/24 0610    Specimen: Blood Updated: 01/04/24 0654     Protime 18.8 Seconds      INR 1.54            Imaging Results (Last 24 Hours)       Procedure Component Value Units Date/Time    XR Chest 1 View [[id number]] Collected: 01/04/24 0527     Updated: 01/04/24 0527    Narrative:        Patient: ECTOR SANCHEZ  Time Out: 05:27  Exam(s): XR CXR 1 VIEW     EXAM:    XR Chest, 1 View    CLINICAL HISTORY:     Reason  for exam: suboptimal positioning on prior imaging.    TECHNIQUE:    Frontal view of the chest.    COMPARISON:  Single view of the chest January 4, 2023    IMPRESSION:       1.  No acute cardiopulmonary abnormality.  2.  Sternotomy and CABG changes.  3.  Consider cross-sectional imaging if there is further concern.    Impression:          Electronically signed by Eliseo Marcus MD on 01-04-24 at 0527    CT Chest Without Contrast Diagnostic [[id number]] Collected: 01/04/24 0429     Updated: 01/04/24 0429    Narrative:        Patient: ECTOR SANCHEZ  Time Out: 04:28  Exam(s): CT CHEST Without Contrast     EXAM:    CT Chest Without Intravenous Contrast    CLINICAL HISTORY:     Reason for exam: soa.    TECHNIQUE:    Axial computed tomography images of the chest without intravenous   contrast.  CTDI is 6.63 mGy and DLP is 275.6 mGy-cm.  This CT exam was   performed according to the principle of ALARA (As Low As Reasonably   Achievable) by using one or more of the following dose reduction   techniques: automated exposure control, adjustment of the mA and or kV   according to patient size, and or use of iterative reconstruction   technique.    COMPARISON:    No relevant prior studies available.    FINDINGS:    Limitations:  Limited examination the absence of contrast.    Lungs:  Areas of tree-in-bud nodularity noted within the lungs, most   prominently the lower lobes and left upper lobe with all lobes involved.    Findings are favored to be related to chronic atypical infection.  No   consolidation.    Pleural space:  Unremarkable.  No pneumothorax.  No significant   effusion.    Heart:  Unremarkable.  No cardiomegaly.  No significant pericardial   effusion.  No significant coronary artery calcifications.    Bones joints:  Sternotomy changes.  Degenerative changes in the spine.    No acute fracture.  No dislocation.    Soft tissues:  Unremarkable.    Vasculature:  Atherosclerotic disease.  No thoracic aortic aneurysm.     Lymph nodes:  Unremarkable.  No enlarged lymph nodes.    IMPRESSION:       1.  Limited examination the absence of contrast.  2.  Areas of tree-in-bud nodularity noted within the lungs, most   prominently the lower lobes and left upper lobe with all lobes involved.    Findings are favored to be related to chronic atypical infection.  3.  No other acute findings.  4.  Incidental findings as described.    Impression:          Electronically signed by Eliseo Marcus MD on 01-04-24 at 0428    XR Chest 1 View [[id number]] Collected: 01/04/24 0112     Updated: 01/04/24 0119    Narrative:      Portable chest radiograph     HISTORY: Shortness of air     TECHNIQUE: Single AP portable radiograph of the chest     COMPARISON: Chest radiograph 11/20/2023       Impression:      FINDINGS AND IMPRESSION:  There are median sternotomy wires.     Evaluation is suboptimal due to patient rotation. No pulmonary  consolidation or pleural effusion is seen a lucent band projects through  the right mid to lower lung with lung markings clearly extending beyond  this area. Findings are most suggestive of overlying skin fold. However,  recommend repeat AP chest radiograph with correction of patient rotation  to exclude the small possibility of pneumothorax.     Cardiac silhouette is accentuated by patient rotation..     This report was finalized on 1/4/2024 1:16 AM by Dr. Prabhakar Brito M.D  on Workstation: [id number]       CT Head Without Contrast [[id number]] Collected: 01/04/24 0105     Updated: 01/04/24 0105    Narrative:        Patient: ECTOR SANCHEZ  Time Out: 01:04  Exam(s): CT HEAD Without Contrast     EXAM:    CT Head Without Intravenous Contrast    CLINICAL HISTORY:     Reason for exam: fall, struck head.    TECHNIQUE:    Axial computed tomography images of the head brain without intravenous   contrast.  CTDI is 55.9 mGy and DLP is 1035 mGy-cm.  This CT exam was   performed according to the principle of ALARA (As Low As Reasonably    Achievable) by using one or more of the following dose reduction   techniques: automated exposure control, adjustment of the mA and or kV   according to patient size, and or use of iterative reconstruction   technique.    COMPARISON:    No relevant prior studies available.    FINDINGS:    Brain:  No hemorrhage, herniation, or mass effect.  Chronic   microvascular ischemic changes.    Ventricles:  No hydrocephalus.  Age related cerebral volume loss.    Bones joints:  Unremarkable.    Soft tissues: Benign calcified left scalp lesion at the vertex.    Sinuses:  Right maxillary sinus mucosal retention cyst    Mastoid air cells:  Clear.    IMPRESSION:         No acute hemorrhage, hydrocephalus, or mass effect.      Impression:          Electronically signed by Lucien White MD on 01-04-24 at 0104            Results for orders placed during the hospital encounter of 03/06/23    Adult Transthoracic Echo Complete W/ Cont if Necessary Per Protocol    Interpretation Summary    There is akinesis of the mid to distal inferoseptum, distal inferior wall, and inferior apex    Left ventricular ejection fraction appears to be 41 - 45%.    Left ventricular diastolic function is consistent with (grade III w/high LAP) fixed restrictive pattern.    The right ventricular cavity is mildly dilated. Normal right ventricular systolic function noted.    The left atrial cavity is moderately dilated.    Mild tricuspid valve regurgitation is present.    Calculated right ventricular systolic pressure from tricuspid regurgitation is 59 mmHg.    The inferior vena cava is dilated. IVC inspiratory collapse is absent.    There is no evidence of pericardial effusion.      ECG 12 Lead Syncope   Final Result   HEART RATE= 77  bpm   RR Interval= 779  ms   VT Interval= 151  ms   P Horizontal Axis= -58  deg   P Front Axis= 0  deg   QRSD Interval= 163  ms   QT Interval= 424  ms   QTcB= 480  ms   QRS Axis= -66  deg   T Wave Axis= 21  deg   - ABNORMAL ECG  -   Sinus rhythm   Atrial premature complexes   RBBB and LAFB   Anterior infarct, age indeterminate   When compared with ECG of 11-Dec-2023 1:54:18,   Significant rate increase otherwise no change   Electronically Signed By: Grupo Hurst ([facility]) 04-Jan-2024 07:51:32   Date and Time of Study: 2024-01-03 23:35:50           Assessment/Plan     Active Hospital Problems    Diagnosis  POA    **Syncope [R55]  Yes    Abnormal CT of the chest [R93.89]  Yes    ESRD (end stage renal disease) [N18.6]  Yes    History of stroke [Z86.73]  Not Applicable    Anemia, chronic disease [D63.8]  Yes    Essential hypertension [I10]  Yes    Coronary artery disease involving native coronary artery of native heart without angina pectoris [I25.10]  Yes    Chronic combined systolic and diastolic CHF (congestive heart failure) [I50.42]  Yes    Acquired hypothyroidism [E03.9]  Yes    Type 2 diabetes mellitus, with long-term current use of insulin [E11.9, Z79.4]  Not Applicable      Resolved Hospital Problems   No resolved problems to display.       Mr. Mo is a 68 y.o. male non-smoker with a history of multiple medical issues, including but not limited to, ESRD on HD, hypothyroidism, Afib, CHF, CAD, anemia, DM, HTN, hx stroke who is admitted for syncope    Syncope/Chronic CHF/Afib/CAD:  -Cardiology consult. Check Orthostatics. CTH negative for acute findings. EKG as above. HS trop elevated in setting of ESRD w/o complaints of chest pain, appears to be chronically elevated. Echo 3/2023 EF 41-45%.  Monitor on telemetry. Strict I&O, daily weights. OT/PT eval    ESRD:  -Dialysis completed 1/3/23. Consult Nephrology. Monitor labs    Abnormal CT chest:  -tree-in-bud nodularities all lobes likely chronic atypical infection. Denies respiratory symptoms, fever. No leukocytosis. Monitor for now    Anemia:  -Monitor Hgb, stable acutely    Hypothyroidism:  -check TFT's    DM:  -Monitor BG trends. Resume home regimen when available. Correctional scale  insulin. A1C 6.90% 12/2023    Hx stroke    HTN:  -BP acceptable acutely. Denies chest pain, CHF symptoms      I discussed the patient's findings and my recommendations with patient.    VTE Prophylaxis - Eliquis (home med).  Code Status - Full code.       JI Sommers  [facility]  01/04/24  10:39 EST

## 2024-01-04 NOTE — ED TRIAGE NOTES
Pt presents to ED via [facility] after a syncopal episode while on the toilet. Per EMS pt was SOA with tachypnea. SPO2 on RA was 98%. Pt given duoneb in route.    Pt is unsure if he hit his head when he lost consciousness, pt denies pain anywhere after fall. Pt denies anticoagulants.

## 2024-01-04 NOTE — ED NOTES
Nursing report ED to floor  Carlito Mo  68 y.o.  male    HPI :   Chief Complaint   Patient presents with    Syncope    Shortness of Breath       Admitting doctor:   Alexey Haddda MD    Admitting diagnosis:   The primary encounter diagnosis was Syncope, unspecified syncope type. A diagnosis of ESRD (end stage renal disease) was also pertinent to this visit.    Code status:   Current Code Status       Date Active Code Status Order ID Comments User Context       Prior            Allergies:   Prozac [fluoxetine hcl]    Isolation:   No active isolations    Intake and Output  No intake or output data in the 24 hours ending 01/04/24 0605    Weight:       01/03/24  2250   Weight: 64.9 kg (143 lb)       Most recent vitals:   Vitals:    01/04/24 0159 01/04/24 0437 01/04/24 0437 01/04/24 0441   BP:   118/62 119/96   Pulse: 84 89  94   Resp:       Temp:       TempSrc:       SpO2: 96% 98%  98%   Weight:       Height:           Active LDAs/IV Access:   Lines, Drains & Airways       Active LDAs       Name Placement date Placement time Site Days    Peripheral IV 01/03/24 2320 Left Antecubital 01/03/24  2320  Antecubital  less than 1                    Labs (abnormal labs have a star):   Labs Reviewed   TROPONIN - Abnormal; Notable for the following components:       Result Value    HS Troponin T 179 (*)     All other components within normal limits    Narrative:     High Sensitive Troponin T Reference Range:  <14.0 ng/L- Negative Female for AMI  <22.0 ng/L- Negative Male for AMI  >=14 - Abnormal Female indicating possible myocardial injury.  >=22 - Abnormal Male indicating possible myocardial injury.   Clinicians would have to utilize clinical acumen, EKG, Troponin, and serial changes to determine if it is an Acute Myocardial Infarction or myocardial injury due to an underlying chronic condition.        COMPREHENSIVE METABOLIC PANEL - Abnormal; Notable for the following components:    Glucose 173 (*)     BUN 40 (*)      Creatinine 4.64 (*)     CO2 21.6 (*)     AST (SGOT) 52 (*)     Anion Gap 22.4 (*)     eGFR 13.0 (*)     All other components within normal limits    Narrative:     GFR Normal >60  Chronic Kidney Disease <60  Kidney Failure <15     PROTIME-INR - Abnormal; Notable for the following components:    Protime 18.7 (*)     INR 1.54 (*)     All other components within normal limits   HIGH SENSITIVITIY TROPONIN T 2HR - Abnormal; Notable for the following components:    HS Troponin T 175 (*)     Troponin T Delta -4 (*)     All other components within normal limits    Narrative:     High Sensitive Troponin T Reference Range:  <14.0 ng/L- Negative Female for AMI  <22.0 ng/L- Negative Male for AMI  >=14 - Abnormal Female indicating possible myocardial injury.  >=22 - Abnormal Male indicating possible myocardial injury.   Clinicians would have to utilize clinical acumen, EKG, Troponin, and serial changes to determine if it is an Acute Myocardial Infarction or myocardial injury due to an underlying chronic condition.        CBC WITH AUTO DIFFERENTIAL - Abnormal; Notable for the following components:    Hemoglobin 11.4 (*)     MCH 25.8 (*)     MCHC 29.8 (*)     RDW 15.9 (*)     Neutrophil % 76.2 (*)     Lymphocyte % 15.6 (*)     Eosinophil % 0.2 (*)     All other components within normal limits   RESPIRATORY PANEL PCR W/ COVID-19 (SARS-COV-2), NP SWAB IN UTM/VTP, 2 HR TAT - Normal    Narrative:     In the setting of a positive respiratory panel with a viral infection PLUS a negative procalcitonin without other underlying concern for bacterial infection, consider observing off antibiotics or discontinuation of antibiotics and continue supportive care. If the respiratory panel is positive for atypical bacterial infection (Bordetella pertussis, Chlamydophila pneumoniae, or Mycoplasma pneumoniae), consider antibiotic de-escalation to target atypical bacterial infection.   APTT - Normal   MAGNESIUM - Normal   RAINBOW DRAW     Narrative:     The following orders were created for panel order [place] Draw.  Procedure                               Abnormality         Status                     ---------                               -----------         ------                     Green Top (Gel)[[id number]]                                  Final result               Lavender Top[[id number]]                                     Final result               Gold Top - SST[[id number]]                                   Final result               Light Blue Top[[id number]]                                   Final result                 Please view results for these tests on the individual orders.   BASIC METABOLIC PANEL   CBC WITH AUTO DIFFERENTIAL   PROTIME-INR   POCT GLUCOSE FINGERSTICK   POCT GLUCOSE FINGERSTICK   POCT GLUCOSE FINGERSTICK   POCT GLUCOSE FINGERSTICK   GREEN TOP   LAVENDER TOP   GOLD TOP - SST   LIGHT BLUE TOP   CBC AND DIFFERENTIAL    Narrative:     The following orders were created for panel order CBC & Differential.  Procedure                               Abnormality         Status                     ---------                               -----------         ------                     CBC Auto Differential[[id number]]        Abnormal            Final result                 Please view results for these tests on the individual orders.       EKG:   ECG 12 Lead Syncope   Preliminary Result   HEART RATE= 77  bpm   RR Interval= 779  ms   WV Interval= 151  ms   P Horizontal Axis= -58  deg   P Front Axis= 0  deg   QRSD Interval= 163  ms   QT Interval= 424  ms   QTcB= 480  ms   QRS Axis= -66  deg   T Wave Axis= 21  deg   - ABNORMAL ECG -   Sinus rhythm   Atrial premature complexes   Right bundle branch block   Anterior infarct, age indeterminate   Electronically Signed By:    Date and Time of Study: 2024-01-03 23:35:50          Meds given in ED:   Medications   sodium chloride 0.9 % flush 10 mL (has no administration in time range)   sodium  chloride 0.9 % flush 10 mL (has no administration in time range)   sodium chloride 0.9 % flush 10 mL (has no administration in time range)   sodium chloride 0.9 % flush 10 mL (has no administration in time range)   sodium chloride 0.9 % infusion 40 mL (has no administration in time range)   sennosides-docusate (PERICOLACE) 8.6-50 MG per tablet 2 tablet (has no administration in time range)     And   polyethylene glycol (MIRALAX) packet 17 g (has no administration in time range)     And   bisacodyl (DULCOLAX) EC tablet 5 mg (has no administration in time range)     And   bisacodyl (DULCOLAX) suppository 10 mg (has no administration in time range)   dextrose (GLUTOSE) oral gel 15 g (has no administration in time range)   dextrose (D50W) (25 g/50 mL) IV injection 25 g (has no administration in time range)   glucagon (GLUCAGEN) injection 1 mg (has no administration in time range)   insulin lispro (HUMALOG/ADMELOG) injection 2-7 Units (has no administration in time range)   nitroglycerin (NITROSTAT) SL tablet 0.4 mg (has no administration in time range)   acetaminophen (TYLENOL) tablet 650 mg (has no administration in time range)     Or   acetaminophen (TYLENOL) 160 MG/5ML oral solution 650 mg (has no administration in time range)     Or   acetaminophen (TYLENOL) suppository 650 mg (has no administration in time range)   ondansetron (ZOFRAN) injection 4 mg (has no administration in time range)       Imaging results:  XR Chest 1 View    Result Date: 1/4/2024  Electronically signed by Eliseo Marcus MD on 01-04-24 at 0539    CT Chest Without Contrast Diagnostic    Result Date: 1/4/2024  Electronically signed by Eliseo Marcus MD on 01-04-24 at 0428    XR Chest 1 View    Result Date: 1/4/2024  FINDINGS AND IMPRESSION: There are median sternotomy wires.  Evaluation is suboptimal due to patient rotation. No pulmonary consolidation or pleural effusion is seen a lucent band projects through the right mid to lower lung with  lung markings clearly extending beyond this area. Findings are most suggestive of overlying skin fold. However, recommend repeat AP chest radiograph with correction of patient rotation to exclude the small possibility of pneumothorax.  Cardiac silhouette is accentuated by patient rotation..  This report was finalized on 1/4/2024 1:16 AM by Dr. Prabhakar Brito M.D on Workstation: [id number]      CT Head Without Contrast    Result Date: 1/4/2024  Electronically signed by Lucien White MD on 01-04-24 at 0104     Ambulatory status:   - x1 assistance     Social issues:   Social History     Socioeconomic History    Marital status:      Spouse name: Lissette    Number of children: 3    Years of education: college   Tobacco Use    Smoking status: Never    Smokeless tobacco: Never    Tobacco comments:     CAFFEINE - OCCAS. SODA   Vaping Use    Vaping Use: Never used   Substance and Sexual Activity    Alcohol use: No     Comment: last drink 2 months ago    Drug use: No    Sexual activity: Defer       NIH Stroke Scale:       Song Marie RN  01/04/24 06:05 EST

## 2024-01-04 NOTE — CONSULTS
Date of Consultation: 24    Referral Provider: Riki Rey VI, *     Reason for Consultation: Syncope.    Encounter Provider: Mauricio Corona MD    Group of Service: [facility]     Patient Name: Carlito Mo    :1955    Chief complaint: Syncope.    History of Present Illness:      This is a very pleasant 68 year-old male who normally follows with Dr. Murguia in our practice.  He has a history of coronary artery disease status post CABG in .  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  He also has had paroxysmal atrial fibrillation and a chronic right bundle branch block.     His ejection fraction has varied over the years, and an EF of 30 to 35% was noted in May 2020.  His echocardiogram on 2022 showed an ejection fraction of 56%.  However, his most recent ejection fraction on 3/8/2023 showed an ejection fraction of 40 to 45% with distal inferoseptal, distal inferior wall, and inferior apical wall motion abnormalities.     The patient presented to the emergency department on 2023 after his spouse called EMS as he had been lethargic, vomiting, and having diarrhea.  While in the ER, he had an episode of hematemesis which evidently was coffee-ground in nature. He was admitted to the ICU for further care. He was also found to be in rapid atrial fibrillation but he responded well to a Cardizem drip. His HS troponin was 176 initially, then 157, then 155. He has a chronically elevated troponin. His EKG showed atrial fibrillation with a right bundle branch block, which is chronic for him. He had an EDG on 2023 that showed a hiatal hernia and LA grade B esophagitis. Biopsies consistent with chronic inflammation without evidence of intestinal metaplasia or H. Pylori. He was started on oral protonix 40 mg twice daily, and his  Eliquis was resumed at lower dose 2.5 mg twice daily. His Coreg was also resumed at 6.25 mg twice daily. He was ultimately discharged on 12/11/2023.     The patient presented after a syncopal episode in the bathroom.  Unfortunately, he does not have any recollection of the events, and could not give me much of a history.  He is a poor historian in general.  He did report that he has been weak after dialysis, which may have precipitated this.  He has been in atrial fibrillation which has been largely rate controlled since he was admitted.  He has had some intermittent hypotension, as low as 78/46 at 1 point.  He has not had any chest pain or significant shortness of breath.      Vitals on arrival:   Temp 98.6  HR 80  /72  100% on Room air      ECHO 3/8/23     There is akinesis of the mid to distal inferoseptum, distal inferior wall, and inferior apex    Left ventricular ejection fraction appears to be 41 - 45%.    Left ventricular diastolic function is consistent with (grade III w/high LAP) fixed restrictive pattern.    The right ventricular cavity is mildly dilated. Normal right ventricular systolic function noted.    The left atrial cavity is moderately dilated.    Mild tricuspid valve regurgitation is present.    Calculated right ventricular systolic pressure from tricuspid regurgitation is 59 mmHg.    The inferior vena cava is dilated. IVC inspiratory collapse is absent.    There is no evidence of pericardial effusion.     Stress Test 4/24/19  Myocardial perfusion imaging indicates a medium-sized infarct located in the apex/periapical areas with no significant ischemia noted.  Left ventricular ejection fraction is mildly reduced (Calculated EF = 44%).    Past Medical History:   Diagnosis Date    Acquired hypothyroidism     Acute congestive heart failure     Acute respiratory failure with hypoxia     Acute sinusitis     SYLVAIN (acute kidney injury)     on CKD    Anemia     Arthritis     CAD (coronary artery  disease)     Cancer     skin    Chronic combined systolic and diastolic congestive heart failure     Chronic ischemic heart disease     Chronic kidney disease (CKD)     CKD (chronic kidney disease)     COPD (chronic obstructive pulmonary disease)     Depression     Diabetes mellitus type 2, uncontrolled, without complications     Diabetic neuropathy     Disease of thyroid gland     Elevated cholesterol     Fatigue     Frequent PVCs     Generalized weakness     GERD (gastroesophageal reflux disease)     Heart attack     History of coronary artery disease     with remote history of bypass surgery in 2001    Hyperlipidemia     Hypertension     Hypertensive encephalopathy     Mental status change     Acute    Nausea & vomiting 12/01/2023    NO DEVICE/RECALLED     Pleural effusion     Pneumonia     Poor historian     RBBB (right bundle branch block)     Stroke     POOR VISION    TIA (transient ischemic attack)     Type 2 diabetes mellitus     Urinary retention     Vitamin D deficiency          Past Surgical History:   Procedure Laterality Date    APPENDECTOMY N/A 02/14/2016    Dr. Alexey Dodson    ARTERIOVENOUS FISTULA/SHUNT SURGERY Right 06/03/2022    Procedure: RIGHT FOREARM ARTERIOVENOUS FISTULA PLACEMENT RADIOCEPHALIC WITH CEPHALIC VEIN TRANSPOSITION;  Surgeon: Eliseo Willis MD;  Location: [facility] MAIN OR;  Service: Vascular;  Laterality: Right;    COLONOSCOPY      over 20 years ago.  no polyps     COLONOSCOPY N/A 09/18/2018    Procedure: COLONOSCOPY;  Surgeon: Jose Enrique Louie MD;  Location: [facility] ENDOSCOPY;  Service: Gastroenterology    COLONOSCOPY N/A 09/19/2018    IH, EH, polyps (TAs w/low grade dysplasia)    COLONOSCOPY N/A 06/01/2020    Procedure: COLONOSCOPY;  Surgeon: Jose Enrique Louie MD;  Location: [facility] ENDOSCOPY;  Service: Gastroenterology;  Laterality: N/A;  Pre op-Anemia, Melena, History of Polyps  Post op-To Cecum/TI, Polyp, Poor Prep    CORONARY ARTERY BYPASS GRAFT  11/2001     ENDOSCOPY N/A 05/29/2020    Procedure: ESOPHAGOGASTRODUODENOSCOPY with biopsies;  Surgeon: Amanda Lovelace MD;  Location:  [facility];  Service: Gastroenterology;  Laterality: N/A;  pre-anemia, dark stools  post-esophagitis, hiatal hernia    ENDOSCOPY N/A 09/15/2020    Procedure: ESOPHAGOGASTRODUODENOSCOPY WITH BIOPSIES;  Surgeon: Jose Enrique Louie MD;  Location:  [facility];  Service: Gastroenterology;  Laterality: N/A;  pre: history of melena and esophagitis  post: mild esophagitis and gastritis, small hiatal hernia    ENDOSCOPY N/A 12/4/2023    Procedure: ESOPHAGOGASTRODUODENOSCOPY with bx;  Surgeon: Quoc Elmore MD;  Location:  [facility];  Service: Gastroenterology;  Laterality: N/A;  pre: Coffee-ground emesis  post: hiatal hernia, esophagitis    HERNIA REPAIR Left 12/05/2019    THORACENTESIS      TONSILLECTOMY      VASECTOMY           Allergies   Allergen Reactions    Prozac [Fluoxetine Hcl] Other (See Comments)     shaking         No current facility-administered medications on file prior to encounter.     Current Outpatient Medications on File Prior to Encounter   Medication Sig Dispense Refill    apixaban (ELIQUIS) 2.5 MG tablet tablet Take 1 tablet by mouth Every 12 (Twelve) Hours. Indications: Atrial Fibrillation      budesonide-formoterol (SYMBICORT) 160-4.5 MCG/ACT inhaler Inhale 2 puffs 2 (Two) Times a Day. 1 each 3    buPROPion XL (WELLBUTRIN XL) 150 MG 24 hr tablet Take 1 tablet by mouth Daily.      carvedilol (COREG) 6.25 MG tablet Take 1 tablet by mouth 2 (Two) Times a Day With Meals. 60 tablet 3    famotidine (PEPCID) 10 MG tablet Take 1 tablet by mouth 2 (Two) Times a Day As Needed for Heartburn.      Insulin Glargine w/ Trans Port (Basaglar Tempo Pen) 100 UNIT/ML solution pen-injector Inject 2 Units under the skin into the appropriate area as directed Every Night.      ipratropium-albuterol (COMBIVENT RESPIMAT)  MCG/ACT inhaler Inhale 1 puff 4 (Four) Times a Day  As Needed for Wheezing.      levothyroxine (SYNTHROID, LEVOTHROID) 75 MCG tablet Take 1 tablet by mouth Daily. 30 tablet 5    pantoprazole (PROTONIX) 40 MG EC tablet Take 1 tablet by mouth 2 (Two) Times a Day Before Meals.      rosuvastatin (CRESTOR) 10 MG tablet Take 1 tablet by mouth Daily.      tamsulosin (FLOMAX) 0.4 MG capsule 24 hr capsule Take 1 capsule by mouth Daily.      acetaminophen (TYLENOL) 325 MG tablet Take 2 tablets by mouth Every 4 (Four) Hours As Needed for Mild Pain. (Patient not taking: Reported on 1/4/2024)      bisacodyl (DULCOLAX) 10 MG suppository Insert 1 suppository into the rectum Daily As Needed for Constipation. (Patient not taking: Reported on 1/4/2024)      ferrous sulfate 325 (65 FE) MG tablet Take 1 tablet by mouth Daily With Breakfast. (Patient not taking: Reported on 1/4/2024) 30 tablet 3    ondansetron ODT (ZOFRAN-ODT) 4 MG disintegrating tablet Place 1 tablet on the tongue Every 8 (Eight) Hours As Needed for Nausea or Vomiting. (Patient not taking: Reported on 1/4/2024) 12 tablet 0    polyethylene glycol 17 g packet Take 17 g by mouth Daily As Needed (constipation). (Patient not taking: Reported on 1/4/2024)      sennosides-docusate (PERICOLACE) 8.6-50 MG per tablet Take 2 tablets by mouth 2 (Two) Times a Day. Hold for loose stools. (Patient not taking: Reported on 1/4/2024)      sodium bicarbonate 650 MG tablet Take 1 tablet by mouth 3 (Three) Times a Day. (Patient taking differently: Take 1 tablet by mouth 3 (Three) Times a Day. Pt is unsure if he's currently taking) 90 tablet 3         Social History     Socioeconomic History    Marital status:      Spouse name: Lissette    Number of children: 3    Years of education: college   Tobacco Use    Smoking status: Never    Smokeless tobacco: Never    Tobacco comments:     CAFFEINE - OCCAS. SODA   Vaping Use    Vaping Use: Never used   Substance and Sexual Activity    Alcohol use: No     Comment: last drink 2 months ago    Drug  "use: No    Sexual activity: Defer         Family History   Problem Relation Age of Onset    Diabetes Mother     Hypertension Mother     Macular degeneration Mother     Alcohol abuse Father     Cancer Father         lung,  age 65    Heart disease Father     Alcohol abuse Brother     Cirrhosis Brother          age 50    Liver cancer Brother 45    Diabetes Son     Kidney failure Son     Autism Son     Malig Hyperthermia Neg Hx        REVIEW OF SYSTEMS:   Pertinent positives are noted in the HPI above.  Otherwise, all other systems were reviewed, and are negative.     Objective:     Vitals:    24 1730 24 1735 24 1948 24   BP: 115/78 (!) 78/46  142/79   BP Location: Left arm Left arm  Left arm   Patient Position: Sitting Standing  Lying   Pulse: 84 87 62 76   Resp:   16 16   Temp:    97.6 °F (36.4 °C)   TempSrc:    Oral   SpO2: 96% 98% 100% 100%   Weight:       Height:         Body mass index is 20.33 kg/m².  Flowsheet Rows      Flowsheet Row First Filed Value   Admission Height 167.6 cm (66\") Documented at 2024   Admission Weight 64.9 kg (143 lb) Documented at 2024             General:    No acute distress, alert and oriented x4, chronically ill-appearing.  Pale.                   Head:    Normocephalic, atraumatic.   Eyes:          Conjunctivae and sclerae normal, no icterus.   Throat:   No oral lesions, no thrush, oral mucosa moist.    Neck:   Supple, trachea midline.   Lungs:     Clear to auscultation bilaterally     Heart:    Irregularly irregular rhythm with a normal rate.  No murmurs, gallops, or rubs noted.   Abdomen:     Soft, non-tender, non-distended, positive bowel sounds.    Extremities:   No clubbing, cyanosis, or edema.     Pulses:   Pulses palpable and equal bilaterally.    Skin:   No bleeding or rash.   Neuro:   Non-focal.     Psychiatric:   Normal mood and affect.           Lab Review:                Results from last 7 days   Lab Units " 01/04/24  0610   SODIUM mmol/L 145   POTASSIUM mmol/L 4.3   CHLORIDE mmol/L 101   CO2 mmol/L 27.0   BUN mg/dL 44*   CREATININE mg/dL 4.85*   GLUCOSE mg/dL 147*   CALCIUM mg/dL 9.5     Results from last 7 days   Lab Units 01/04/24  0210 01/03/24  2320   HSTROP T ng/L 175* 179*     Results from last 7 days   Lab Units 01/04/24  0610   WBC 10*3/mm3 4.18   HEMOGLOBIN g/dL 10.9*   HEMATOCRIT % 35.5*   PLATELETS 10*3/mm3 287     Results from last 7 days   Lab Units 01/04/24  0610 01/03/24  2320   INR  1.54* 1.54*   APTT seconds  --  31.2         Results from last 7 days   Lab Units 01/03/24  2320   MAGNESIUM mg/dL 2.4           EKG (reviewed by me personally):    1/3/24    11/30/23          Assessment:   1.  Syncope, likely secondary to orthostatic hypotension following dialysis  2.  Chronically elevated troponin secondary to renal disease  3.  Paroxysmal atrial fibrillation (more persistent recently)  4.  Chronic combined CHF, euvolemic currently  5.  Cardiomyopathy with varying ejection fractions in the past, EF 40 to 45% on 3/8/2023  6.  Recent coffee-ground emesis in December 2023, no obvious bleeding source by EGD on 12/4/2023  7.  Coronary artery disease status post CABG in 2001  8.  History of embolic CVA, on chronic anticoagulation  9.  End-stage renal disease, on hemodialysis  10.  COPD without acute exacerbation (on home O2)  11.  Anemia of chronic disease  12.  Right bundle branch block, chronic  13.  Longstanding diabetes  14.  Deconditioning and frailty    Plan:       I strongly suspect that the episode of syncope was from orthostatic hypotension in the setting of recent dialysis.  He has been given a bolus of saline by nephrology, and his carvedilol was cut in half to 3.125 mg twice daily by nephrology as well.  He does have more persistent atrial fibrillation recently, and we will need to see how his heart rate does with the decrease in the carvedilol.      His blood pressure is likely to be labile,  although midodrine could be considered if needed in the future.  He does not appear to be in congestive heart failure currently.  His troponin is chronically elevated secondary to his renal disease, and I do not suspect ACS.    Thank you very much for this consult.    Jonatan Corona MD

## 2024-01-04 NOTE — ED PROVIDER NOTES
EMERGENCY DEPARTMENT ENCOUNTER    Room Number:  12/12  PCP: Florencia Caballero MD  Patient Care Team:  Florencia Caballero MD as PCP - General (Internal Medicine)  Amber Murguia MD as Consulting Physician (Cardiology)  Sera [facility] as Pharmacist  Seth Ladd MD as Surgeon (General Surgery)  Kim Hoyos MD PhD as Consulting Physician (Hematology and Oncology)  Jerome Diallo MD as Referring Physician (Family Medicine)  Jose Enrique Louie MD as Consulting Physician (Gastroenterology)  Nima Huddleston MD as Consulting Physician (Nephrology)   Independent Historians: Patient    HPI:  Chief Complaint: Syncope    A complete HPI/ROS/PMH/PSH/SH/FH are unobtainable due to: None    Chronic or social conditions impacting patient care (Social Determinants of Health): None  (Financial Resource Strain / Food Insecurity / Transportation Needs / Physical Activity / Stress / Social Connections / Intimate Partner Violence / Housing Stability)    Context: Carlito Mo is a 68 y.o. male who presents to the ED c/o acute syncopal episode in the bathroom.  Patient states he passed out had no prodrome.  No feelings of lightheadedness or dizziness.  Patient does states he has been feeling weak all day.  Patient states he felt weak all day since doing dialysis.  No chest pain.  Patient states he has shortness of breath which has been present for the past several weeks but has gotten worse recently.  Denies fever or chills.  Denies headache or neck pain.  Denying any other pain as well.    Review of prior external notes (non-ED) -and- Review of prior external test results outside of this encounter: Patient discharge summary from 12/11/2023    Prescription drug monitoring program review:         PAST MEDICAL HISTORY  Active Ambulatory Problems     Diagnosis Date Noted    Major depressive disorder with single episode, in partial remission     Other hyperlipidemia     Type 2 diabetes mellitus, with long-term  current use of insulin     Vitamin D deficiency 12/17/2015    Erectile dysfunction 12/17/2015    Chronic fatigue 04/25/2016    Type 2 diabetes mellitus with ophthalmic complication 04/25/2016    Skin lesion of chest wall 06/20/2016    Slow transit constipation 09/01/2016    Benign prostatic hyperplasia with nocturia 12/20/2016    History of basal cell carcinoma 12/20/2016    High blood pressure associated with diabetes 12/20/2016    Acquired hypothyroidism 12/20/2017    Hypertensive urgency 02/16/2018    Recurrent strokes 02/20/2018    Noncompliance w/medication treatment due to intermit use of medication 05/04/2018    Urinary retention 09/20/2018    Pneumonia 09/21/2018    Chronic combined systolic and diastolic CHF (congestive heart failure) 09/21/2018    Acute respiratory failure with hypoxia 09/21/2018    Suspected sleep apnea 10/29/2018    Pleural effusion 12/01/2018    YAW (obstructive sleep apnea) 12/13/2018    Coronary artery disease involving native coronary artery of native heart without angina pectoris 04/18/2019    Chronically elevated troponin 07/02/2019    Colon cancer screening 10/30/2018    Enlarged lymph nodes 10/30/2018    Functional gait abnormality 10/30/2018    History of myocardial infarction 02/06/2019    Hospital discharge follow-up 05/31/2019    Insomnia 02/06/2019    Iron deficiency anemia 10/02/2018    Major depression, recurrent 10/16/2012    Anemia, chronic renal failure, stage 3 (moderate) 02/27/2020    Essential hypertension 03/10/2020    Adverse effect of iron and its compounds, initial encounter 05/23/2020    Acute on chronic renal insufficiency 05/25/2020    Debility 05/25/2020    Falls frequently 05/25/2020    Acute pain of right shoulder 05/27/2020    Acute on chronic anemia 05/25/2020    Heme positive stool 05/25/2020    Neurogenic orthostatic hypotension 05/30/2020    Anemia, chronic disease 05/25/2020    History of colon polyps 05/25/2020    Helicobacter pylori gastritis  06/04/2020    Esophagitis 06/10/2020    Sleep related hypoxia 07/23/2020    Embolic stroke 05/20/2021    Patent foramen ovale 05/22/2021    Pleural effusion, bilateral 05/22/2021    Cardiomyopathy 05/24/2021    Lower abdominal pain 12/13/2021    Diarrhea 12/14/2021    CKD (chronic kidney disease) stage 4, GFR 15-29 ml/min 12/16/2021    Hypertension not at goal 02/02/2022    Memory loss due to medical condition 02/08/2022    Generalized weakness 03/01/2022    ERRONEOUS ENCOUNTER--DISREGARD 03/09/2022    Weakness 06/03/2022    Paroxysmal atrial fibrillation 07/25/2022    Chronic anticoagulation 07/25/2022    Multiple falls 09/15/2022    Dehydration 09/16/2022    SYLVAIN (acute kidney injury) 09/16/2022    Acute ischemic stroke 09/17/2022    Acute combined systolic and diastolic congestive heart failure 05/02/2023    History of stroke 05/02/2023    Iron deficiency anemia 05/04/2023    Acute HFrEF (heart failure with reduced ejection fraction) 05/05/2023    Atrial fibrillation with rapid ventricular response 11/29/2023    ESRD (end stage renal disease) 12/01/2023    Nausea & vomiting 12/01/2023    Hemoptysis 12/01/2023    Coffee ground emesis 11/28/2023    Severe malnutrition 12/09/2023    Chronic HFrEF (heart failure with reduced ejection fraction) 12/11/2023     Resolved Ambulatory Problems     Diagnosis Date Noted    Anemia     Arthritis     Diabetes mellitus out of control     Acute sinusitis     Accumulation of fluid in tissues 12/17/2015    Fatigue 12/17/2015    Cardiomyopathy, ischemic 12/17/2015    Acute appendicitis 03/02/2016    Atherosclerosis of coronary artery 10/16/2012    Altered mental status 11/30/2017    Hypertensive encephalopathy syndrome 04/30/2018    Hyperglycemia 05/04/2018    Screening for colorectal cancer 08/22/2018    Constipation 08/22/2018    Snoring 12/13/2018    Lower abdominal pain 05/27/2020    Hypertensive emergency 02/01/2022    DE LEON (dyspnea on exertion) 03/07/2023    Shortness of breath  03/08/2023     Past Medical History:   Diagnosis Date    Acute congestive heart failure     CAD (coronary artery disease)     Cancer     Chronic combined systolic and diastolic congestive heart failure     Chronic ischemic heart disease     Chronic kidney disease (CKD)     CKD (chronic kidney disease)     COPD (chronic obstructive pulmonary disease)     Depression     Diabetes mellitus type 2, uncontrolled, without complications     Diabetic neuropathy     Disease of thyroid gland     Elevated cholesterol     Frequent PVCs     GERD (gastroesophageal reflux disease)     Heart attack     History of coronary artery disease     Hyperlipidemia     Hypertension     Hypertensive encephalopathy     Mental status change     NO DEVICE/RECALLED     Poor historian     RBBB (right bundle branch block)     Stroke     TIA (transient ischemic attack)     Type 2 diabetes mellitus          PAST SURGICAL HISTORY  Past Surgical History:   Procedure Laterality Date    APPENDECTOMY N/A 02/14/2016    Dr. Alexey Dodson    ARTERIOVENOUS FISTULA/SHUNT SURGERY Right 06/03/2022    Procedure: RIGHT FOREARM ARTERIOVENOUS FISTULA PLACEMENT RADIOCEPHALIC WITH CEPHALIC VEIN TRANSPOSITION;  Surgeon: Eliseo Willis MD;  Location: [facility] OR;  Service: Vascular;  Laterality: Right;    COLONOSCOPY      over 20 years ago.  no polyps     COLONOSCOPY N/A 09/18/2018    Procedure: COLONOSCOPY;  Surgeon: Jose Enrique Louie MD;  Location: [facility] ENDOSCOPY;  Service: Gastroenterology    COLONOSCOPY N/A 09/19/2018    IH, EH, polyps (TAs w/low grade dysplasia)    COLONOSCOPY N/A 06/01/2020    Procedure: COLONOSCOPY;  Surgeon: Jose Enrique Louie MD;  Location: [facility] ENDOSCOPY;  Service: Gastroenterology;  Laterality: N/A;  Pre op-Anemia, Melena, History of Polyps  Post op-To Cecum/TI, Polyp, Poor Prep    CORONARY ARTERY BYPASS GRAFT  11/2001    ENDOSCOPY N/A 05/29/2020    Procedure: ESOPHAGOGASTRODUODENOSCOPY with biopsies;  Surgeon: Amanda Lovelace  MD KRISTYN;  Location: [facility] ENDOSCOPY;  Service: Gastroenterology;  Laterality: N/A;  pre-anemia, dark stools  post-esophagitis, hiatal hernia    ENDOSCOPY N/A 09/15/2020    Procedure: ESOPHAGOGASTRODUODENOSCOPY WITH BIOPSIES;  Surgeon: Jose Enrique Louie MD;  Location:  [facility] ENDOSCOPY;  Service: Gastroenterology;  Laterality: N/A;  pre: history of melena and esophagitis  post: mild esophagitis and gastritis, small hiatal hernia    ENDOSCOPY N/A 2023    Procedure: ESOPHAGOGASTRODUODENOSCOPY with bx;  Surgeon: Quoc Elmore MD;  Location: [facility] ENDOSCOPY;  Service: Gastroenterology;  Laterality: N/A;  pre: Coffee-ground emesis  post: hiatal hernia, esophagitis    HERNIA REPAIR Left 2019    THORACENTESIS      TONSILLECTOMY      VASECTOMY           FAMILY HISTORY  Family History   Problem Relation Age of Onset    Diabetes Mother     Hypertension Mother     Macular degeneration Mother     Alcohol abuse Father     Cancer Father         lung,  age 65    Heart disease Father     Alcohol abuse Brother     Cirrhosis Brother          age 50    Liver cancer Brother 45    Diabetes Son     Kidney failure Son     Autism Son     Malig Hyperthermia Neg Hx          SOCIAL HISTORY  Social History     Socioeconomic History    Marital status:      Spouse name: Lissette    Number of children: 3    Years of education: college   Tobacco Use    Smoking status: Never    Smokeless tobacco: Never    Tobacco comments:     CAFFEINE - OCCAS. SODA   Vaping Use    Vaping Use: Never used   Substance and Sexual Activity    Alcohol use: No     Comment: last drink 2 months ago    Drug use: No    Sexual activity: Defer         ALLERGIES  Prozac [fluoxetine hcl]        REVIEW OF SYSTEMS  Review of Systems  Included in HPI  All systems reviewed and negative except for those discussed in HPI.      PHYSICAL EXAM    I have reviewed the triage vital signs and nursing notes.    ED Triage Vitals [24 2250]   Temp  Heart Rate Resp BP SpO2   98.6 °F (37 °C) 80 26 136/72 100 %      Temp src Heart Rate Source Patient Position BP Location FiO2 (%)   Tympanic Monitor -- -- --       Physical Exam  GENERAL: alert, no acute distress, chronically ill-appearing  SKIN: Warm, dry  HENT: Normocephalic, atraumatic  EYES: no scleral icterus  CV: regular rhythm, regular rate  RESPIRATORY: normal effort, lungs clear  ABDOMEN: soft, nontender, nondistended  MUSCULOSKELETAL: no deformity, dialysis shunt to right upper extremity with palpable thrill  NEURO: alert, moves all extremities, follows commands                                                               LAB RESULTS  Recent Results (from the past 24 hour(s))   High Sensitivity Troponin T    Collection Time: 01/03/24 11:20 PM    Specimen: Blood   Result Value Ref Range    HS Troponin T 179 (C) <22 ng/L   Green Top (Gel)    Collection Time: 01/03/24 11:20 PM   Result Value Ref Range    Extra Tube Hold for add-ons.    Lavender Top    Collection Time: 01/03/24 11:20 PM   Result Value Ref Range    Extra Tube hold for add-on    Gold Top - SST    Collection Time: 01/03/24 11:20 PM   Result Value Ref Range    Extra Tube Hold for add-ons.    Light Blue Top    Collection Time: 01/03/24 11:20 PM   Result Value Ref Range    Extra Tube Hold for add-ons.    Comprehensive Metabolic Panel    Collection Time: 01/03/24 11:20 PM    Specimen: Blood   Result Value Ref Range    Glucose 173 (H) 65 - 99 mg/dL    BUN 40 (H) 8 - 23 mg/dL    Creatinine 4.64 (H) 0.76 - 1.27 mg/dL    Sodium 142 136 - 145 mmol/L    Potassium 4.9 3.5 - 5.2 mmol/L    Chloride 98 98 - 107 mmol/L    CO2 21.6 (L) 22.0 - 29.0 mmol/L    Calcium 9.8 8.6 - 10.5 mg/dL    Total Protein 7.8 6.0 - 8.5 g/dL    Albumin 4.3 3.5 - 5.2 g/dL    ALT (SGPT) 33 1 - 41 U/L    AST (SGOT) 52 (H) 1 - 40 U/L    Alkaline Phosphatase 98 39 - 117 U/L    Total Bilirubin 0.4 0.0 - 1.2 mg/dL    Globulin 3.5 gm/dL    A/G Ratio 1.2 g/dL    BUN/Creatinine Ratio 8.6  7.0 - 25.0    Anion Gap 22.4 (H) 5.0 - 15.0 mmol/L    eGFR 13.0 (L) >60.0 mL/min/1.73   Protime-INR    Collection Time: 01/03/24 11:20 PM    Specimen: Blood   Result Value Ref Range    Protime 18.7 (H) 11.7 - 14.2 Seconds    INR 1.54 (H) 0.90 - 1.10   aPTT    Collection Time: 01/03/24 11:20 PM    Specimen: Blood   Result Value Ref Range    PTT 31.2 22.7 - 35.4 seconds   Magnesium    Collection Time: 01/03/24 11:20 PM    Specimen: Blood   Result Value Ref Range    Magnesium 2.4 1.6 - 2.4 mg/dL   CBC Auto Differential    Collection Time: 01/03/24 11:20 PM    Specimen: Blood   Result Value Ref Range    WBC 5.01 3.40 - 10.80 10*3/mm3    RBC 4.42 4.14 - 5.80 10*6/mm3    Hemoglobin 11.4 (L) 13.0 - 17.7 g/dL    Hematocrit 38.2 37.5 - 51.0 %    MCV 86.4 79.0 - 97.0 fL    MCH 25.8 (L) 26.6 - 33.0 pg    MCHC 29.8 (L) 31.5 - 35.7 g/dL    RDW 15.9 (H) 12.3 - 15.4 %    RDW-SD 48.7 37.0 - 54.0 fl    MPV 9.9 6.0 - 12.0 fL    Platelets 314 140 - 450 10*3/mm3    Neutrophil % 76.2 (H) 42.7 - 76.0 %    Lymphocyte % 15.6 (L) 19.6 - 45.3 %    Monocyte % 7.6 5.0 - 12.0 %    Eosinophil % 0.2 (L) 0.3 - 6.2 %    Basophil % 0.2 0.0 - 1.5 %    Immature Grans % 0.2 0.0 - 0.5 %    Neutrophils, Absolute 3.82 1.70 - 7.00 10*3/mm3    Lymphocytes, Absolute 0.78 0.70 - 3.10 10*3/mm3    Monocytes, Absolute 0.38 0.10 - 0.90 10*3/mm3    Eosinophils, Absolute 0.01 0.00 - 0.40 10*3/mm3    Basophils, Absolute 0.01 0.00 - 0.20 10*3/mm3    Immature Grans, Absolute 0.01 0.00 - 0.05 10*3/mm3    nRBC 0.0 0.0 - 0.2 /100 WBC   ECG 12 Lead Syncope    Collection Time: 01/03/24 11:35 PM   Result Value Ref Range    QT Interval 424 ms    QTC Interval 480 ms   Respiratory Panel PCR w/COVID-19(SARS-CoV-2) [facility]-House, NP Swab in [facility], 2 HR TAT - Swab, Nasopharynx    Collection Time: 01/04/24 12:41 AM    Specimen: Nasopharynx; Swab   Result Value Ref Range    ADENOVIRUS, PCR Not Detected Not Detected    Coronavirus 229E Not Detected Not Detected     Coronavirus HKU1 Not Detected Not Detected    Coronavirus NL63 Not Detected Not Detected    Coronavirus OC43 Not Detected Not Detected    COVID19 Not Detected Not Detected - Ref. Range    Human Metapneumovirus Not Detected Not Detected    Human Rhinovirus/Enterovirus Not Detected Not Detected    Influenza A PCR Not Detected Not Detected    Influenza B PCR Not Detected Not Detected    Parainfluenza Virus 1 Not Detected Not Detected    Parainfluenza Virus 2 Not Detected Not Detected    Parainfluenza Virus 3 Not Detected Not Detected    Parainfluenza Virus 4 Not Detected Not Detected    RSV, PCR Not Detected Not Detected    Bordetella pertussis pcr Not Detected Not Detected    Bordetella parapertussis PCR Not Detected Not Detected    Chlamydophila pneumoniae PCR Not Detected Not Detected    Mycoplasma pneumo by PCR Not Detected Not Detected   High Sensitivity Troponin T 2Hr    Collection Time: 01/04/24  2:10 AM    Specimen: Blood   Result Value Ref Range    HS Troponin T 175 (C) <22 ng/L    Troponin T Delta -4 (L) >=-4 - <+4 ng/L       I ordered the above labs and independently reviewed the results.        RADIOLOGY  CT Chest Without Contrast Diagnostic    Result Date: 1/4/2024  Patient: ECTOR SANCHEZ  Time Out: 04:28 Exam(s): CT CHEST Without Contrast EXAM:   CT Chest Without Intravenous Contrast CLINICAL HISTORY:    Reason for exam: soa. TECHNIQUE:   Axial computed tomography images of the chest without intravenous contrast.  CTDI is 6.63 mGy and DLP is 275.6 mGy-cm.  This CT exam was performed according to the principle of ALARA (As Low As Reasonably Achievable) by using one or more of the following dose reduction techniques: automated exposure control, adjustment of the mA and or kV according to patient size, and or use of iterative reconstruction technique. COMPARISON:   No relevant prior studies available. FINDINGS:   Limitations:  Limited examination the absence of contrast.   Lungs:  Areas of tree-in-bud  nodularity noted within the lungs, most prominently the lower lobes and left upper lobe with all lobes involved.  Findings are favored to be related to chronic atypical infection.  No consolidation.   Pleural space:  Unremarkable.  No pneumothorax.  No significant effusion.   Heart:  Unremarkable.  No cardiomegaly.  No significant pericardial effusion.  No significant coronary artery calcifications.   Bones joints:  Sternotomy changes.  Degenerative changes in the spine.  No acute fracture.  No dislocation.   Soft tissues:  Unremarkable.   Vasculature:  Atherosclerotic disease.  No thoracic aortic aneurysm.   Lymph nodes:  Unremarkable.  No enlarged lymph nodes. IMPRESSION:     1.  Limited examination the absence of contrast. 2.  Areas of tree-in-bud nodularity noted within the lungs, most prominently the lower lobes and left upper lobe with all lobes involved.  Findings are favored to be related to chronic atypical infection. 3.  No other acute findings. 4.  Incidental findings as described.    Electronically signed by Eliseo Marcus MD on 01-04-24 at 0428    XR Chest 1 View    Result Date: 1/4/2024  Portable chest radiograph  HISTORY: Shortness of air  TECHNIQUE: Single AP portable radiograph of the chest  COMPARISON: Chest radiograph 11/20/2023      FINDINGS AND IMPRESSION: There are median sternotomy wires.  Evaluation is suboptimal due to patient rotation. No pulmonary consolidation or pleural effusion is seen a lucent band projects through the right mid to lower lung with lung markings clearly extending beyond this area. Findings are most suggestive of overlying skin fold. However, recommend repeat AP chest radiograph with correction of patient rotation to exclude the small possibility of pneumothorax.  Cardiac silhouette is accentuated by patient rotation..  This report was finalized on 1/4/2024 1:16 AM by Dr. Prabhakar Brito M.D on Workstation: [id number]      CT Head Without Contrast    Result Date:  1/4/2024  Patient: ECTOR SANCHEZ  Time Out: 01:04 Exam(s): CT HEAD Without Contrast EXAM:   CT Head Without Intravenous Contrast CLINICAL HISTORY:    Reason for exam: fall, struck head. TECHNIQUE:   Axial computed tomography images of the head brain without intravenous contrast.  CTDI is 55.9 mGy and DLP is 1035 mGy-cm.  This CT exam was performed according to the principle of ALARA (As Low As Reasonably Achievable) by using one or more of the following dose reduction techniques: automated exposure control, adjustment of the mA and or kV according to patient size, and or use of iterative reconstruction technique. COMPARISON:   No relevant prior studies available. FINDINGS:   Brain:  No hemorrhage, herniation, or mass effect.  Chronic microvascular ischemic changes.   Ventricles:  No hydrocephalus.  Age related cerebral volume loss.   Bones joints:  Unremarkable.   Soft tissues: Benign calcified left scalp lesion at the vertex.   Sinuses:  Right maxillary sinus mucosal retention cyst   Mastoid air cells:  Clear. IMPRESSION:       No acute hemorrhage, hydrocephalus, or mass effect.     Electronically signed by Lucien White MD on 01-04-24 at 0104     I ordered the above noted radiological studies. Reviewed by me and discussed with radiologist.  See dictation for official radiology interpretation.      PROCEDURES    Procedures      MEDICATIONS GIVEN IN ER    Medications   sodium chloride 0.9 % flush 10 mL (has no administration in time range)   sodium chloride 0.9 % flush 10 mL (has no administration in time range)         ORDERS PLACED DURING THIS VISIT:  Orders Placed This Encounter   Procedures    Respiratory Panel PCR w/COVID-19(SARS-CoV-2) [facility], NP Swab in UTM/VTM, 2 HR TAT - Swab, Nasopharynx    XR Chest 1 View    CT Head Without Contrast    XR Chest 1 View    CT Chest Without Contrast Diagnostic    High Sensitivity Troponin T    [place] Draw    Comprehensive Metabolic Panel     Protime-INR    aPTT    Magnesium    High Sensitivity Troponin T 2Hr    CBC Auto Differential    Monitor Blood Pressure    Pulse Oximetry, Continuous    Orthostatic Vitals    [facility] (on-call MD unless specified) Details    ECG 12 Lead Syncope    Insert peripheral IV    Insert Peripheral IV    Initiate Observation Status    Green Top (Gel)    Lavender Top    Gold Top - SST    Light Blue Top    CBC & Differential         PROGRESS, DATA ANALYSIS, CONSULTS, AND MEDICAL DECISION MAKING    All labs have been independently interpreted by me.  All radiology studies have been reviewed by me and discussed with radiologist dictating the report.   EKG's independently viewed and interpreted by me.  Discussion below represents my analysis of pertinent findings related to patient's condition, differential diagnosis, treatment plan and final disposition.    My differential diagnosis for syncope includes but is not limited to:  Vasovagal reflex - situational stimulus, micturition, defecation, cough, sneezing, swallowing, postprandial state, react sinus hypersensitivity  Vascular-prolonged recumbency, sudden postural change, prolonged standing, hypovolemia, vasodilator drugs, autonomic neuropathy, adrenal insufficiency, subclavian steal, pulmonary embolism  Cardiac -arrhythmia, heart block, myocardial infarction, aortic stenosis, cardiac myxoma, cardiac, LV Dysfunction, Aortic Dissection, Pulmonary Hypertension, Pulmonary Stenosis, Pacemaker Failure  CNS-seizure, hypoxia, hypoglycemia, TIA,(basal vertebral), hydrocephalus      Clinical Scores:              ED Course as of 01/04/24 0516   Wed Jan 03, 2024   2353 EKG          EKG time: 2335  Rhythm/Rate: A-fib rate 77  P waves and LA: Fib  QRS, axis: Right bundle branch block  ST and T waves: Nonspecific    Interpreted Contemporaneously by me, independently viewed  Anterior lateral ST depressions and flipped T waves more prominent than previously seen on EKG.   [TJ]   u Jan 04, 2024    0007 WBC: 5.01 [TJ]   0007 Hemoglobin(!): 11.4 [TJ]   0007 Platelets: 314 [TJ]   0007 HS Troponin T(!!): 179  baseline [TJ]   0119 Creatinine(!): 4.64 [TJ]   0119 Potassium: 4.9 [TJ]      ED Course User Index  [TJ] Riki Rey MD             PPE: The patient wore a mask and I wore an N95 mask throughout the entire patient encounter.       AS OF 05:16 EST VITALS:    BP - 119/96  HR - 94  TEMP - 98.6 °F (37 °C) (Tympanic)  O2 SATS - 98%        DIAGNOSIS  Final diagnoses:   Syncope, unspecified syncope type   ESRD (end stage renal disease)         DISPOSITION  ED Disposition       ED Disposition   Decision to Admit    Condition   --    Comment   Level of Care: Telemetry [5]   Diagnosis: Syncope [449957]   Admitting Physician: PHILLIP RILEY [[id number]]                    Note Disclaimer: At [facility], we believe that sharing information builds trust and better relationships. You are receiving this note because you recently visited [facility]. It is possible you will see health information before a provider has talked with you about it. This kind of information can be easy to misunderstand. To help you fully understand what it means for your health, we urge you to discuss this note with your provider.         Riki Rey MD  01/04/24 0516

## 2024-01-04 NOTE — CONSULTS
[facility] Consult Note      Patient Name: Carlito Mo  : 1955  MRN: [id number]  Primary Care Physician:  Florencia Caballero MD  Referring Physician: Riki INGRAM MD  Date of admission: 1/3/2024    Subjective     Reason for Consult:  ESRD     HPI:   Carlito Mo is a 68 y.o. male known to have history of end-stage renal disease on maintenance hemodialysis on  and Saturday schedule at [facility], [place] under the care of Dr. Blake who presented after dialysis yesterday  with progressive weakness and syncope.    -Patient also history of paroxysmal atrial fibrillation, history of chronic mild systolic and diastolic heart failure, hypothyroidism and type 2 diabetes mellitus.  -Patient noted that he has been feeling weak all day since coming back from dialysis.  Denies any orthostatic symptoms but noted loss of ultrasound to follow-up after trying to stand up.  He denies any orthostatic symptoms, no chest pain.   -In ER he was noted to be normotensive to hypertensive, sodium 145, potassium 4.3, chloride 101, CO2 27, creatinine 4.85.  Hemoglobin 10.9  - Noted weakness  and fatigue after dialysis  -Noted decreased oral intake recently    Review of Systems:   14 point review of systems is otherwise negative except for mentioned above on HPI    Personal History     Past Medical History:   Diagnosis Date    Acquired hypothyroidism     Acute congestive heart failure     Acute respiratory failure with hypoxia     Acute sinusitis     SYLVAIN (acute kidney injury)     on CKD    Anemia     Arthritis     CAD (coronary artery disease)     Cancer     skin    Chronic combined systolic and diastolic congestive heart failure     Chronic ischemic heart disease     Chronic kidney disease (CKD)     CKD (chronic kidney disease)     COPD (chronic obstructive pulmonary disease)     Depression     Diabetes mellitus type 2, uncontrolled, without complications     Diabetic neuropathy      Disease of thyroid gland     Elevated cholesterol     Fatigue     Frequent PVCs     Generalized weakness     GERD (gastroesophageal reflux disease)     Heart attack     History of coronary artery disease     with remote history of bypass surgery in 2001    Hyperlipidemia     Hypertension     Hypertensive encephalopathy     Mental status change     Acute    Nausea & vomiting 12/01/2023    NO DEVICE/RECALLED     Pleural effusion     Pneumonia     Poor historian     RBBB (right bundle branch block)     Stroke     POOR VISION    TIA (transient ischemic attack)     Type 2 diabetes mellitus     Urinary retention     Vitamin D deficiency        Past Surgical History:   Procedure Laterality Date    APPENDECTOMY N/A 02/14/2016    Dr. Alexey Dodson    ARTERIOVENOUS FISTULA/SHUNT SURGERY Right 06/03/2022    Procedure: RIGHT FOREARM ARTERIOVENOUS FISTULA PLACEMENT RADIOCEPHALIC WITH CEPHALIC VEIN TRANSPOSITION;  Surgeon: Eliseo Willis MD;  Location: [facility] MAIN OR;  Service: Vascular;  Laterality: Right;    COLONOSCOPY      over 20 years ago.  no polyps     COLONOSCOPY N/A 09/18/2018    Procedure: COLONOSCOPY;  Surgeon: Jose Enrique Louie MD;  Location: [facility] ENDOSCOPY;  Service: Gastroenterology    COLONOSCOPY N/A 09/19/2018    IH, EH, polyps (TAs w/low grade dysplasia)    COLONOSCOPY N/A 06/01/2020    Procedure: COLONOSCOPY;  Surgeon: Jose Enrique Louie MD;  Location[facility] ENDOSCOPY;  Service: Gastroenterology;  Laterality: N/A;  Pre op-Anemia, Melena, History of Polyps  Post op-To Cecum/TI, Polyp, Poor Prep    CORONARY ARTERY BYPASS GRAFT  11/2001    ENDOSCOPY N/A 05/29/2020    Procedure: ESOPHAGOGASTRODUODENOSCOPY with biopsies;  Surgeon: Amanda Lovelace MD;  Location: [facility] ENDOSCOPY;  Service: Gastroenterology;  Laterality: N/A;  pre-anemia, dark stools  post-esophagitis, hiatal hernia    ENDOSCOPY N/A 09/15/2020    Procedure: ESOPHAGOGASTRODUODENOSCOPY WITH BIOPSIES;  Surgeon: Jose Enrique Louie  MD;  Location: [facility] ENDOSCOPY;  Service: Gastroenterology;  Laterality: N/A;  pre: history of melena and esophagitis  post: mild esophagitis and gastritis, small hiatal hernia    ENDOSCOPY N/A 12/4/2023    Procedure: ESOPHAGOGASTRODUODENOSCOPY with bx;  Surgeon: Quoc Elmore MD;  Location: [facility] ENDOSCOPY;  Service: Gastroenterology;  Laterality: N/A;  pre: Coffee-ground emesis  post: hiatal hernia, esophagitis    HERNIA REPAIR Left 12/05/2019    THORACENTESIS      TONSILLECTOMY      VASECTOMY         Family History: family history includes Alcohol abuse in his brother and father; Autism in his son; Cancer in his father; Cirrhosis in his brother; Diabetes in his mother and son; Heart disease in his father; Hypertension in his mother; Kidney failure in his son; Liver cancer (age of onset: 45) in his brother; Macular degeneration in his mother.    Social History:  reports that he has never smoked. He has never used smokeless tobacco. He reports that he does not drink alcohol and does not use drugs.    Home Medications:  Prior to Admission medications    Medication Sig Start Date End Date Taking? Authorizing Provider   apixaban (ELIQUIS) 2.5 MG tablet tablet Take 1 tablet by mouth Every 12 (Twelve) Hours. Indications: Atrial Fibrillation 12/11/23  Yes Aria Red MD   budesonide-formoterol (SYMBICORT) 160-4.5 MCG/ACT inhaler Inhale 2 puffs 2 (Two) Times a Day. 5/5/23  Yes Smith Henson MD   buPROPion XL (WELLBUTRIN XL) 150 MG 24 hr tablet Take 1 tablet by mouth Daily.   Yes Alonzo Dean MD   carvedilol (COREG) 6.25 MG tablet Take 1 tablet by mouth 2 (Two) Times a Day With Meals. 5/30/23  Yes Nida Lopes APRN   famotidine (PEPCID) 10 MG tablet Take 1 tablet by mouth 2 (Two) Times a Day As Needed for Heartburn.   Yes Alonzo Dean MD   Insulin Glargine w/ Trans Port (Basaglar Tempo Pen) 100 UNIT/ML solution pen-injector Inject 2 Units under the skin into the appropriate area as  directed Every Night.   Yes ProviderAlonzo MD   ipratropium-albuterol (COMBIVENT RESPIMAT)  MCG/ACT inhaler Inhale 1 puff 4 (Four) Times a Day As Needed for Wheezing.   Yes Alonzo Dean MD   levothyroxine (SYNTHROID, LEVOTHROID) 75 MCG tablet Take 1 tablet by mouth Daily. 2/8/22  Yes Erica Welsh MD   pantoprazole (PROTONIX) 40 MG EC tablet Take 1 tablet by mouth 2 (Two) Times a Day Before Meals. 12/11/23  Yes Aria Red MD   rosuvastatin (CRESTOR) 10 MG tablet Take 1 tablet by mouth Daily. 12/12/23  Yes Aria Red MD   tamsulosin (FLOMAX) 0.4 MG capsule 24 hr capsule Take 1 capsule by mouth Daily.   Yes ProviderAlonzo MD   acetaminophen (TYLENOL) 325 MG tablet Take 2 tablets by mouth Every 4 (Four) Hours As Needed for Mild Pain.  Patient not taking: Reported on 1/4/2024 9/19/22   Asim Hogue MD   bisacodyl (DULCOLAX) 10 MG suppository Insert 1 suppository into the rectum Daily As Needed for Constipation.  Patient not taking: Reported on 1/4/2024 12/11/23   Aria Red MD   ferrous sulfate 325 (65 FE) MG tablet Take 1 tablet by mouth Daily With Breakfast.  Patient not taking: Reported on 1/4/2024 5/5/23   Smith Henson MD   ondansetron ODT (ZOFRAN-ODT) 4 MG disintegrating tablet Place 1 tablet on the tongue Every 8 (Eight) Hours As Needed for Nausea or Vomiting.  Patient not taking: Reported on 1/4/2024 11/24/23   Klever Jane PA   polyethylene glycol 17 g packet Take 17 g by mouth Daily As Needed (constipation).  Patient not taking: Reported on 1/4/2024 12/11/23   Aria Red MD   sennosides-docusate (PERICOLACE) 8.6-50 MG per tablet Take 2 tablets by mouth 2 (Two) Times a Day. Hold for loose stools.  Patient not taking: Reported on 1/4/2024 12/11/23   Aria Red MD   sodium bicarbonate 650 MG tablet Take 1 tablet by mouth 3 (Three) Times a Day.  Patient taking differently: Take 1 tablet by mouth 3 (Three)  Times a Day. Pt is unsure if he's currently taking 5/5/23   Smith Henson MD       Allergies:  Allergies   Allergen Reactions    Prozac [Fluoxetine Hcl] Other (See Comments)     shaking       Objective     Vitals:   Temp:  [98.4 °F (36.9 °C)-98.6 °F (37 °C)] 98.4 °F (36.9 °C)  Heart Rate:  [61-94] 91  Resp:  [22-26] 22  BP: (118-150)/(62-96) 150/86    Intake/Output Summary (Last 24 hours) at 1/4/2024 1625  Last data filed at 1/4/2024 1212  Gross per 24 hour   Intake --   Output 100 ml   Net -100 ml       Physical Exam:   Constitutional: Frail, ill looking.  Emaciated  HEENT: Sclera anicteric, no conjunctival injection  Neck: Supple, no thyromegaly, no lymphadenopathy, trachea at midline, no JVD  Respiratory: Clear to auscultation bilaterally, nonlabored respiration  Cardiovascular: RRR, no murmurs, no rubs or gallops, no carotid bruit  Gastrointestinal: Positive bowel sounds, abdomen is soft, nontender and nondistended  : No palpable bladder  Musculoskeletal: No edema, no clubbing or cyanosis  Psychiatric: Appropriate affect, cooperative  Neurologic: Oriented x3, moving all extremities, normal speech and mental status  Skin: Warm and dry       Scheduled Meds:     apixaban, 2.5 mg, Oral, Q12H  budesonide-formoterol, 2 puff, Inhalation, BID - RT  buPROPion XL, 150 mg, Oral, Daily  carvedilol, 6.25 mg, Oral, BID With Meals  insulin glargine, 2 Units, Subcutaneous, Nightly  insulin lispro, 2-7 Units, Subcutaneous, 4x Daily AC & at Bedtime  levothyroxine, 75 mcg, Oral, Daily  pantoprazole, 40 mg, Oral, BID AC  rosuvastatin, 10 mg, Oral, Daily  senna-docusate sodium, 2 tablet, Oral, BID  sodium chloride, 10 mL, Intravenous, Q12H  tamsulosin, 0.4 mg, Oral, Daily      IV Meds:        Results Reviewed:   I have personally reviewed the results from the time of this admission to 1/4/2024 16:25 EST     Lab Results   Component Value Date    GLUCOSE 147 (H) 01/04/2024    CALCIUM 9.5 01/04/2024     01/04/2024    K 4.3  01/04/2024    CO2 27.0 01/04/2024     01/04/2024    BUN 44 (H) 01/04/2024    CREATININE 4.85 (H) 01/04/2024    EGFRIFAFRI 23 (L) 09/27/2022    EGFRIFNONA 19 (L) 02/04/2022    BCR 9.1 01/04/2024    ANIONGAP 17.0 (H) 01/04/2024      Lab Results   Component Value Date    MG 2.4 01/03/2024    PHOS 2.8 12/09/2023    ALBUMIN 4.3 01/03/2024           Assessment / Plan     ASSESSMENT:  End-stage renal disease on maintenance hemodialysis on Tuesday Thursday and Saturday.    Hypertension with CKD blood pressure is mildly elevated.  Type 2 diabetes mellitus with CKD  Recent syncope.  Likely secondary to hypovolemia.  Cannot rule out arrhythmia.  Cardiology has been consulted he is currently orthostatic  Systolic and diastolic heart failure with EF of 41 to 45%  Paroxysmal atrial fibrillation  Anemia of chronic kidney disease        PLAN:  Will continue dialysis per schedule on TTS schedule. No indication for HD today   Patient is currently orthostatic.  Will decrease carvedilol to 3.125 mg twice daily  Give a bolus of normal saline for hypovolemia  May consider adding midodrine for better blood pressure control if he continues to be orthostatic after IV  fluids      Thank you for involving us in the care of Carlito Mo.  Please feel free to call with any questions.    Ayad Bangura MD  01/04/24  16:25 [facility]    [facility]  [phone]    Parts of this note may be an electronic transcription/translation of spoken language to printed text using the Dragon dictation system.

## 2024-01-05 LAB
ALBUMIN SERPL-MCNC: 3.5 G/DL (ref 3.5–5.2)
ANION GAP SERPL CALCULATED.3IONS-SCNC: 11.2 MMOL/L (ref 5–15)
BASOPHILS # BLD AUTO: 0.01 10*3/MM3 (ref 0–0.2)
BASOPHILS NFR BLD AUTO: 0.2 % (ref 0–1.5)
BUN SERPL-MCNC: 53 MG/DL (ref 8–23)
BUN/CREAT SERPL: 10.7 (ref 7–25)
CALCIUM SPEC-SCNC: 8.5 MG/DL (ref 8.6–10.5)
CHLORIDE SERPL-SCNC: 100 MMOL/L (ref 98–107)
CO2 SERPL-SCNC: 25.8 MMOL/L (ref 22–29)
CREAT SERPL-MCNC: 4.94 MG/DL (ref 0.76–1.27)
DEPRECATED RDW RBC AUTO: 49.4 FL (ref 37–54)
EGFRCR SERPLBLD CKD-EPI 2021: 12.1 ML/MIN/1.73
EOSINOPHIL # BLD AUTO: 0.13 10*3/MM3 (ref 0–0.4)
EOSINOPHIL NFR BLD AUTO: 2.5 % (ref 0.3–6.2)
ERYTHROCYTE [DISTWIDTH] IN BLOOD BY AUTOMATED COUNT: 15.8 % (ref 12.3–15.4)
GLUCOSE BLDC GLUCOMTR-MCNC: 108 MG/DL (ref 70–130)
GLUCOSE BLDC GLUCOMTR-MCNC: 116 MG/DL (ref 70–130)
GLUCOSE BLDC GLUCOMTR-MCNC: 153 MG/DL (ref 70–130)
GLUCOSE BLDC GLUCOMTR-MCNC: 163 MG/DL (ref 70–130)
GLUCOSE SERPL-MCNC: 156 MG/DL (ref 65–99)
HCT VFR BLD AUTO: 29 % (ref 37.5–51)
HGB BLD-MCNC: 9.2 G/DL (ref 13–17.7)
IMM GRANULOCYTES # BLD AUTO: 0.02 10*3/MM3 (ref 0–0.05)
IMM GRANULOCYTES NFR BLD AUTO: 0.4 % (ref 0–0.5)
LYMPHOCYTES # BLD AUTO: 1.01 10*3/MM3 (ref 0.7–3.1)
LYMPHOCYTES NFR BLD AUTO: 19.1 % (ref 19.6–45.3)
MCH RBC QN AUTO: 27.1 PG (ref 26.6–33)
MCHC RBC AUTO-ENTMCNC: 31.7 G/DL (ref 31.5–35.7)
MCV RBC AUTO: 85.5 FL (ref 79–97)
MONOCYTES # BLD AUTO: 0.6 10*3/MM3 (ref 0.1–0.9)
MONOCYTES NFR BLD AUTO: 11.3 % (ref 5–12)
NEUTROPHILS NFR BLD AUTO: 3.53 10*3/MM3 (ref 1.7–7)
NEUTROPHILS NFR BLD AUTO: 66.5 % (ref 42.7–76)
NRBC BLD AUTO-RTO: 0 /100 WBC (ref 0–0.2)
PHOSPHATE SERPL-MCNC: 4.6 MG/DL (ref 2.5–4.5)
PLATELET # BLD AUTO: 245 10*3/MM3 (ref 140–450)
PMV BLD AUTO: 10.2 FL (ref 6–12)
POTASSIUM SERPL-SCNC: 4.2 MMOL/L (ref 3.5–5.2)
QT INTERVAL: 475 MS
QTC INTERVAL: 475 MS
RBC # BLD AUTO: 3.39 10*6/MM3 (ref 4.14–5.8)
SODIUM SERPL-SCNC: 137 MMOL/L (ref 136–145)
WBC NRBC COR # BLD AUTO: 5.3 10*3/MM3 (ref 3.4–10.8)

## 2024-01-05 PROCEDURE — 97162 PT EVAL MOD COMPLEX 30 MIN: CPT

## 2024-01-05 PROCEDURE — 94799 UNLISTED PULMONARY SVC/PX: CPT

## 2024-01-05 PROCEDURE — 63710000001 INSULIN GLARGINE PER 5 UNITS: Performed by: NURSE PRACTITIONER

## 2024-01-05 PROCEDURE — 93010 ELECTROCARDIOGRAM REPORT: CPT | Performed by: INTERNAL MEDICINE

## 2024-01-05 PROCEDURE — 82948 REAGENT STRIP/BLOOD GLUCOSE: CPT

## 2024-01-05 PROCEDURE — 63710000001 INSULIN LISPRO (HUMAN) PER 5 UNITS: Performed by: NURSE PRACTITIONER

## 2024-01-05 PROCEDURE — 94760 N-INVAS EAR/PLS OXIMETRY 1: CPT

## 2024-01-05 PROCEDURE — 93005 ELECTROCARDIOGRAM TRACING: CPT | Performed by: INTERNAL MEDICINE

## 2024-01-05 PROCEDURE — 94761 N-INVAS EAR/PLS OXIMETRY MLT: CPT

## 2024-01-05 PROCEDURE — 97110 THERAPEUTIC EXERCISES: CPT

## 2024-01-05 PROCEDURE — 85025 COMPLETE CBC W/AUTO DIFF WBC: CPT | Performed by: HOSPITALIST

## 2024-01-05 PROCEDURE — 80069 RENAL FUNCTION PANEL: CPT | Performed by: NURSE PRACTITIONER

## 2024-01-05 PROCEDURE — 99232 SBSQ HOSP IP/OBS MODERATE 35: CPT | Performed by: NURSE PRACTITIONER

## 2024-01-05 RX ADMIN — Medication 10 ML: at 08:51

## 2024-01-05 RX ADMIN — LEVOTHYROXINE SODIUM 75 MCG: 75 TABLET ORAL at 06:27

## 2024-01-05 RX ADMIN — Medication 10 ML: at 21:41

## 2024-01-05 RX ADMIN — APIXABAN 2.5 MG: 2.5 TABLET, FILM COATED ORAL at 09:14

## 2024-01-05 RX ADMIN — CARVEDILOL 3.12 MG: 3.12 TABLET, FILM COATED ORAL at 09:14

## 2024-01-05 RX ADMIN — PANTOPRAZOLE SODIUM 40 MG: 40 TABLET, DELAYED RELEASE ORAL at 16:57

## 2024-01-05 RX ADMIN — APIXABAN 2.5 MG: 2.5 TABLET, FILM COATED ORAL at 21:41

## 2024-01-05 RX ADMIN — BUDESONIDE AND FORMOTEROL FUMARATE DIHYDRATE 2 PUFF: 160; 4.5 AEROSOL RESPIRATORY (INHALATION) at 20:44

## 2024-01-05 RX ADMIN — INSULIN GLARGINE 2 UNITS: 100 INJECTION, SOLUTION SUBCUTANEOUS at 21:40

## 2024-01-05 RX ADMIN — TAMSULOSIN HYDROCHLORIDE 0.4 MG: 0.4 CAPSULE ORAL at 09:14

## 2024-01-05 RX ADMIN — BUPROPION HYDROCHLORIDE 150 MG: 150 TABLET, EXTENDED RELEASE ORAL at 09:14

## 2024-01-05 RX ADMIN — PANTOPRAZOLE SODIUM 40 MG: 40 TABLET, DELAYED RELEASE ORAL at 06:27

## 2024-01-05 RX ADMIN — ROSUVASTATIN CALCIUM 10 MG: 10 TABLET, FILM COATED ORAL at 09:14

## 2024-01-05 RX ADMIN — CARVEDILOL 3.12 MG: 3.12 TABLET, FILM COATED ORAL at 16:57

## 2024-01-05 RX ADMIN — INSULIN LISPRO 2 UNITS: 100 INJECTION, SOLUTION INTRAVENOUS; SUBCUTANEOUS at 11:41

## 2024-01-05 NOTE — THERAPY EVALUATION
Patient Name: Carlito Mo  : 1955    MRN: [id number]                              Today's Date: 2024       Admit Date: 1/3/2024    Visit Dx:     ICD-10-CM ICD-9-CM   1. Syncope, unspecified syncope type  R55 780.2   2. ESRD (end stage renal disease)  N18.6 585.6     Patient Active Problem List   Diagnosis    Major depressive disorder with single episode, in partial remission    Other hyperlipidemia    Type 2 diabetes mellitus, with long-term current use of insulin    Vitamin D deficiency    Erectile dysfunction    Chronic fatigue    Type 2 diabetes mellitus with ophthalmic complication    Skin lesion of chest wall    Slow transit constipation    Benign prostatic hyperplasia with nocturia    History of basal cell carcinoma    High blood pressure associated with diabetes    Acquired hypothyroidism    Hypertensive urgency    Recurrent strokes    Noncompliance w/medication treatment due to intermit use of medication    Urinary retention    Pneumonia    Chronic combined systolic and diastolic CHF (congestive heart failure)    Acute respiratory failure with hypoxia    Suspected sleep apnea    Pleural effusion    YAW (obstructive sleep apnea)    Coronary artery disease involving native coronary artery of native heart without angina pectoris    Chronically elevated troponin    Colon cancer screening    Enlarged lymph nodes    Functional gait abnormality    History of myocardial infarction    Hospital discharge follow-up    Insomnia    Iron deficiency anemia    Major depression, recurrent    Anemia, chronic renal failure, stage 3 (moderate)    Essential hypertension    Adverse effect of iron and its compounds, initial encounter    Acute on chronic renal insufficiency    Debility    Falls frequently    Acute pain of right shoulder    Acute on chronic anemia    Heme positive stool    Neurogenic orthostatic hypotension    Anemia, chronic disease    History of colon polyps    Helicobacter pylori gastritis     Esophagitis    Sleep related hypoxia    Embolic stroke    Patent foramen ovale    Pleural effusion, bilateral    Cardiomyopathy    Lower abdominal pain    Diarrhea    CKD (chronic kidney disease) stage 4, GFR 15-29 ml/min    Hypertension not at goal    Memory loss due to medical condition    Generalized weakness    ERRONEOUS ENCOUNTER--DISREGARD    Weakness    Paroxysmal atrial fibrillation    Chronic anticoagulation    Multiple falls    Dehydration    SYLVAIN (acute kidney injury)    Acute ischemic stroke    Acute combined systolic and diastolic congestive heart failure    History of stroke    Iron deficiency anemia    Acute HFrEF (heart failure with reduced ejection fraction)    Atrial fibrillation with rapid ventricular response    ESRD (end stage renal disease)    Nausea & vomiting    Hemoptysis    Coffee ground emesis    Severe malnutrition    Chronic HFrEF (heart failure with reduced ejection fraction)    Syncope    Abnormal CT of the chest     Past Medical History:   Diagnosis Date    Acquired hypothyroidism     Acute congestive heart failure     Acute respiratory failure with hypoxia     Acute sinusitis     SYLVAIN (acute kidney injury)     on CKD    Anemia     Arthritis     CAD (coronary artery disease)     Cancer     skin    Chronic combined systolic and diastolic congestive heart failure     Chronic ischemic heart disease     Chronic kidney disease (CKD)     CKD (chronic kidney disease)     COPD (chronic obstructive pulmonary disease)     Depression     Diabetes mellitus type 2, uncontrolled, without complications     Diabetic neuropathy     Disease of thyroid gland     Elevated cholesterol     Fatigue     Frequent PVCs     Generalized weakness     GERD (gastroesophageal reflux disease)     Heart attack     History of coronary artery disease     with remote history of bypass surgery in 2001    Hyperlipidemia     Hypertension     Hypertensive encephalopathy     Mental status change     Acute    Nausea & vomiting  12/01/2023    NO DEVICE/RECALLED     Pleural effusion     Pneumonia     Poor historian     RBBB (right bundle branch block)     Stroke     POOR VISION    TIA (transient ischemic attack)     Type 2 diabetes mellitus     Urinary retention     Vitamin D deficiency      Past Surgical History:   Procedure Laterality Date    APPENDECTOMY N/A 02/14/2016    Dr. Alexey Dodson    ARTERIOVENOUS FISTULA/SHUNT SURGERY Right 06/03/2022    Procedure: RIGHT FOREARM ARTERIOVENOUS FISTULA PLACEMENT RADIOCEPHALIC WITH CEPHALIC VEIN TRANSPOSITION;  Surgeon: Eliseo Willis MD;  Location: [facility] MAIN OR;  Service: Vascular;  Laterality: Right;    COLONOSCOPY      over 20 years ago.  no polyps     COLONOSCOPY N/A 09/18/2018    Procedure: COLONOSCOPY;  Surgeon: Jose Enrique Louie MD;  Location: [facility] ENDOSCOPY;  Service: Gastroenterology    COLONOSCOPY N/A 09/19/2018    IH, EH, polyps (TAs w/low grade dysplasia)    COLONOSCOPY N/A 06/01/2020    Procedure: COLONOSCOPY;  Surgeon: Jose Enrique Louie MD;  Location: [facility] ENDOSCOPY;  Service: Gastroenterology;  Laterality: N/A;  Pre op-Anemia, Melena, History of Polyps  Post op-To Cecum/TI, Polyp, Poor Prep    CORONARY ARTERY BYPASS GRAFT  11/2001    ENDOSCOPY N/A 05/29/2020    Procedure: ESOPHAGOGASTRODUODENOSCOPY with biopsies;  Surgeon: Amanda Lovelace MD;  Location: [facility] ENDOSCOPY;  Service: Gastroenterology;  Laterality: N/A;  pre-anemia, dark stools  post-esophagitis, hiatal hernia    ENDOSCOPY N/A 09/15/2020    Procedure: ESOPHAGOGASTRODUODENOSCOPY WITH BIOPSIES;  Surgeon: Jose Enrique Louie MD;  Location: [facility] ENDOSCOPY;  Service: Gastroenterology;  Laterality: N/A;  pre: history of melena and esophagitis  post: mild esophagitis and gastritis, small hiatal hernia    ENDOSCOPY N/A 12/4/2023    Procedure: ESOPHAGOGASTRODUODENOSCOPY with bx;  Surgeon: Quoc Elmore MD;  Location: [facility] ENDOSCOPY;  Service: Gastroenterology;  Laterality: N/A;  pre: Coffee-ground  emesis  post: hiatal hernia, esophagitis    HERNIA REPAIR Left 12/05/2019    THORACENTESIS      TONSILLECTOMY      VASECTOMY        General Information       Row Name 01/05/24 1530          Physical Therapy Time and Intention    Document Type evaluation  -AR     Mode of Treatment physical therapy  -AR       Row Name 01/05/24 1530          General Information    Patient Profile Reviewed yes  -AR     Prior Level of Function min assist:  RW outside of home, recent DC from SNU, normally ind w/ dresing/bathing. spouse does all groceries  -AR     Existing Precautions/Restrictions fall  -AR     Barriers to Rehab none identified  -AR       Row Name 01/05/24 1530          Living Environment    People in Home spouse  -AR       Row Name 01/05/24 1530          Home Main Entrance    Number of Stairs, Main Entrance three  -AR     Stair Railings, Main Entrance railings safe and in good condition  -AR       Row Name 01/05/24 1530          Stairs Within Home, Primary    Stairs, Within Home, Primary trilevel home, bedroom and bathroom upstairs.  autistic son is currently living  in living room  -AR     Number of Stairs, Within Home, Primary three  -AR       Row Name 01/05/24 1530          Cognition    Orientation Status (Cognition) oriented x 3  -AR       Row Name 01/05/24 1530          Safety Issues, Functional Mobility    Safety Issues Affecting Function (Mobility) judgment  -AR     Impairments Affecting Function (Mobility) strength;balance;cognition;endurance/activity tolerance;shortness of breath  -AR               User Key  (r) = Recorded By, (t) = Taken By, (c) = Cosigned By      Initials Name Provider Type    AR Kellen Archibald PT Physical Therapist                   Mobility       Row Name 01/05/24 1531          Bed Mobility    Bed Mobility supine-sit;sit-supine  -AR     Supine-Sit [place] (Bed Mobility) not tested  -AR     Sit-Supine [place] (Bed Mobility) not tested  -AR       Row Name 01/05/24 1531           Sit-Stand Transfer    Sit-Stand Stanley (Transfers) minimum assist (75% patient effort)  -AR     Assistive Device (Sit-Stand Transfers) walker, front-wheeled  -AR       Row Name 01/05/24 1531          Gait/Stairs (Locomotion)    Stanley Level (Gait) contact guard  -AR     Assistive Device (Gait) walker, front-wheeled  -AR     Distance in Feet (Gait) 15' + 15' limitd fatigue and toileting needs  -AR     Deviations/Abnormal Patterns (Gait) gait speed decreased;festinating/shuffling  -AR     Bilateral Gait Deviations heel strike decreased;forward flexed posture  -AR               User Key  (r) = Recorded By, (t) = Taken By, (c) = Cosigned By      Initials Name Provider Type    Kellen Stiles, PT Physical Therapist                   Obj/Interventions       Row Name 01/05/24 1532          Range of Motion Comprehensive    Comment, General Range of Motion B LE WFL  -AR       Row Name 01/05/24 1532          Strength Comprehensive (MMT)    Comment, General Manual Muscle Testing (MMT) Assessment B LE -4/5  -AR       Row Name 01/05/24 1532          Balance    Balance Assessment standing dynamic balance  -AR     Dynamic Standing Balance minimal assist  -AR     Position/Device Used, Standing Balance walker, rolling  -AR               User Key  (r) = Recorded By, (t) = Taken By, (c) = Cosigned By      Initials Name Provider Type    Kellen Stiles, PT Physical Therapist                   Goals/Plan       Row Name 01/05/24 1536          Bed Mobility Goal 1 (PT)    Activity/Assistive Device (Bed Mobility Goal 1, PT) bed mobility activities, all  -AR     Stanley Level/Cues Needed (Bed Mobility Goal 1, PT) standby assist  -AR     Time Frame (Bed Mobility Goal 1, PT) 1 week  -AR       Row Name 01/05/24 1536          Transfer Goal 1 (PT)    Activity/Assistive Device (Transfer Goal 1, PT) sit-to-stand/stand-to-sit;bed-to-chair/chair-to-bed;walker, rolling  -AR     Stanley Level/Cues Needed (Transfer Goal 1,  PT) standby assist  -AR     Time Frame (Transfer Goal 1, PT) 1 week  -AR       Row Name 01/05/24 1536          Gait Training Goal 1 (PT)    Activity/Assistive Device (Gait Training Goal 1, PT) gait (walking locomotion);walker, rolling  -AR     [place] Level (Gait Training Goal 1, PT) standby assist  -AR     Distance (Gait Training Goal 1, PT) 100  -AR     Time Frame (Gait Training Goal 1, PT) 1 week  -AR       Row Name 01/05/24 1536          Stairs Goal 1 (PT)    Activity/Assistive Device (Stairs Goal 1, PT) stairs, all skills  -AR     [place] Level/Cues Needed (Stairs Goal 1, PT) contact guard required  -AR     Number of Stairs (Stairs Goal 1, PT) 4  -AR     Time Frame (Stairs Goal 1, PT) 1 week  -AR       Row Name 01/05/24 1536          Therapy Assessment/Plan (PT)    Planned Therapy Interventions (PT) balance training;bed mobility training;gait training;home exercise program;patient/family education;ROM (range of motion);stair training;strengthening;transfer training  -AR               User Key  (r) = Recorded By, (t) = Taken By, (c) = Cosigned By      Initials Name Provider Type    AR Kellen Archibald, DENISSE Physical Therapist                   Clinical Impression       Row Name 01/05/24 1532          Pain    Pretreatment Pain Rating 0/10 - no pain  -AR     Posttreatment Pain Rating 0/10 - no pain  -AR     Pain Intervention(s) Repositioned  -AR       Row Name 01/05/24 1532          Plan of Care Review    Plan of Care Reviewed With patient;spouse  -AR     Outcome Evaluation Pt admitted from home with syncope after HD, orthostaics negative today per RN.      Pt reportsnormally not using any AD at home, RW outside of home, recently DC home from SNU, ind. w/ dressing/bathing but spuse does all grocery shopping.  trilevel home with bedroom and bathroom upstairs.  Today pt demonstrates generalized weakness, impaired balance and gait pattern from baseline but able toa mbulate 15' +15' w/ min A using RW, slow  shuffling steps withseated rest break for toileting needs.  Nursing reports this is 7th bowel movement today.  Spouse expresses some concerns about pts self care/hygiene at home; spouse reports pt does have h/o depression and does or used to take medicine - discussed with RN.  May benefit from DC to SNU, discussed DC plan/issues w/ pt, spouse, CCP and RN. Recommend sitting in chair few hours/day and ambulating with staff.  -AR       Row Name 01/05/24 1532          Therapy Assessment/Plan (PT)    Rehab Potential (PT) good, to achieve stated therapy goals  -AR     Criteria for Skilled Interventions Met (PT) yes  -AR     Therapy Frequency (PT) 6 times/wk  -AR       Row Name 01/05/24 1532          Vital Signs    O2 Delivery Pre Treatment room air  -AR       Row Name 01/05/24 1532          Positioning and Restraints    Pre-Treatment Position sitting in chair/recliner  -AR     Post Treatment Position chair  -AR     In Chair notified nsg;reclined;sitting;call light within reach;encouraged to call for assist;exit alarm on;with family/caregiver  -AR               User Key  (r) = Recorded By, (t) = Taken By, (c) = Cosigned By      Initials Name Provider Type    Kellen Stiles, PT Physical Therapist                   Outcome Measures       Row Name 01/05/24 1537 01/05/24 0900       How much help from another person do you currently need...    Turning from your back to your side while in flat bed without using bedrails? 3  -AR 3  -AH    Moving from lying on back to sitting on the side of a flat bed without bedrails? 3  -AR 3  -AH    Moving to and from a bed to a chair (including a wheelchair)? 3  -AR 2  -AH    Standing up from a chair using your arms (e.g., wheelchair, bedside chair)? 3  -AR 2  -AH    Climbing 3-5 steps with a railing? 2  -AR 2  -AH    To walk in hospital room? 3  -AR 2  -AH    AM-PAC 6 Clicks Score (PT) 17  -AR 14  -AH    Highest Level of Mobility Goal 5 --> Static standing  -AR 4 --> Transfer to  chair/commode  -      Row Name 01/05/24 1537          Functional Assessment    Outcome Measure Options AM-PAC 6 Clicks Basic Mobility (PT)  -AR               User Key  (r) = Recorded By, (t) = Taken By, (c) = Cosigned By      Initials Name Provider Type    AR Kellen Archibald, PT Physical Therapist    Pam Bray, RN Registered Nurse                                 Physical Therapy Education       Title: PT OT SLP Therapies (In Progress)       Topic: Physical Therapy (In Progress)       Point: Mobility training (In Progress)       Learning Progress Summary             Patient Acceptance, E, NR by AR at 1/5/2024 1537                         Point: Home exercise program (In Progress)       Learning Progress Summary             Patient Acceptance, E, NR by AR at 1/5/2024 1537                         Point: Body mechanics (In Progress)       Learning Progress Summary             Patient Acceptance, E, NR by AR at 1/5/2024 1537                         Point: Precautions (In Progress)       Learning Progress Summary             Patient Acceptance, E, NR by AR at 1/5/2024 1537                                         User Key       Initials Effective Dates Name Provider Type Discipline    AR 06/16/21 -  Kellen Archibald, PT Physical Therapist PT                  PT Recommendation and Plan  Planned Therapy Interventions (PT): balance training, bed mobility training, gait training, home exercise program, patient/family education, ROM (range of motion), stair training, strengthening, transfer training  Plan of Care Reviewed With: patient, spouse  Outcome Evaluation: Pt admitted from home with syncope after HD, orthostaics negative today per RN.      Pt reportsnormally not using any AD at home, RW outside of home, recently DC home from SNU, ind. w/ dressing/bathing but spuse does all grocery shopping.  trilevel home with bedroom and bathroom upstairs.  Today pt demonstrates generalized weakness, impaired balance and gait  pattern from baseline but able toa mbulate 15' +15' w/ min A using RW, slow shuffling steps withseated rest break for toileting needs.  Nursing reports this is 7th bowel movement today.  Spouse expresses some concerns about pts self care/hygiene at home; spouse reports pt does have h/o depression and does or used to take medicine - discussed with RN.  May benefit from DC to SNU, discussed DC plan/issues w/ pt, spouse, CCP and RN. Recommend sitting in chair few hours/day and ambulating with staff.     Time Calculation:         PT Charges       Row Name 01/05/24 1530             Time Calculation    Start Time 1407  -AR      Stop Time 1433  -AR      Time Calculation (min) 26 min  -AR      PT Received On 01/05/24  -AR      PT - Next Appointment 01/06/24  -AR      PT Goal Re-Cert Due Date 01/12/24  -AR                User Key  (r) = Recorded By, (t) = Taken By, (c) = Cosigned By      Initials Name Provider Type    AR Kellen Archibald, PT Physical Therapist                  Therapy Charges for Today       Code Description Service Date Service Provider Modifiers Qty    13910605852 HC PT EVAL MOD COMPLEXITY 3 1/5/2024 Kellen Archibald, PT GP 1    46954789873 HC PT THER PROC EA 15 MIN 1/5/2024 Kellen Archibald, PT GP 1            PT G-Codes  Outcome Measure Options: AM-PAC 6 Clicks Basic Mobility (PT)  AM-PAC 6 Clicks Score (PT): 17  PT Discharge Summary  Anticipated Discharge Disposition (PT): home with 24/7 care, home with home health (may benefit from SNU?)    Kellen Archibald PT  1/5/2024

## 2024-01-05 NOTE — PROGRESS NOTES
" LOS: 0 days     Name: Carlito Mo  Age: 68 y.o.  Sex: male  :  1955  MRN: [id number]         Primary Care Physician: Florencia Caballero MD    Subjective   Subjective  No new complaints or acute overnight events.  Has not had any additional syncope or presyncope    Objective   Vital Signs  Temp:  [97.5 °F (36.4 °C)-97.8 °F (36.6 °C)] 97.5 °F (36.4 °C)  Heart Rate:  [62-87] 76  Resp:  [16] 16  BP: ()/(46-79) 142/75  Body mass index is 19.08 kg/m².    Objective:  General Appearance:  Comfortable and in no acute distress (Chronically ill-appearing).    Vital signs: (most recent): Blood pressure 142/75, pulse 76, temperature 97.5 °F (36.4 °C), temperature source Oral, resp. rate 16, height 182.9 cm (72\"), weight 63.8 kg (140 lb 10.5 oz), SpO2 94%.    Lungs:  Normal effort and normal respiratory rate.  He is not in respiratory distress.  There are decreased breath sounds.    Heart: Normal rate.  Regular rhythm.    Abdomen: Abdomen is soft.  Bowel sounds are normal.   There is no abdominal tenderness.     Extremities: There is no dependent edema or local swelling.    Neurological: Patient is alert and oriented to person, place and time.    Skin:  Warm and dry.                Results Review:       I reviewed the patient's new clinical results.    Results from last 7 days   Lab Units 24  0303 24  0610 24  2320   WBC 10*3/mm3 5.30 4.18 5.01   HEMOGLOBIN g/dL 9.2* 10.9* 11.4*   PLATELETS 10*3/mm3 245 287 314     Results from last 7 days   Lab Units 24  0303 24  0610 24  2320   SODIUM mmol/L 137 145 142   POTASSIUM mmol/L 4.2 4.3 4.9   CHLORIDE mmol/L 100 101 98   CO2 mmol/L 25.8 27.0 21.6*   BUN mg/dL 53* 44* 40*   CREATININE mg/dL 4.94* 4.85* 4.64*   CALCIUM mg/dL 8.5* 9.5 9.8   GLUCOSE mg/dL 156* 147* 173*     Results from last 7 days   Lab Units 24  0610 24  2320   INR  1.54* 1.54*             Scheduled Meds:   apixaban, 2.5 mg, Oral, " Q12H  budesonide-formoterol, 2 puff, Inhalation, BID - RT  buPROPion XL, 150 mg, Oral, Daily  carvedilol, 3.125 mg, Oral, BID With Meals  insulin glargine, 2 Units, Subcutaneous, Nightly  insulin lispro, 2-7 Units, Subcutaneous, 4x Daily AC & at Bedtime  levothyroxine, 75 mcg, Oral, Daily  pantoprazole, 40 mg, Oral, BID AC  rosuvastatin, 10 mg, Oral, Daily  senna-docusate sodium, 2 tablet, Oral, BID  sodium chloride, 10 mL, Intravenous, Q12H  tamsulosin, 0.4 mg, Oral, Daily      PRN Meds:     acetaminophen **OR** acetaminophen **OR** acetaminophen    senna-docusate sodium **AND** polyethylene glycol **AND** bisacodyl **AND** bisacodyl    dextrose    dextrose    famotidine    glucagon (human recombinant)    nitroglycerin    ondansetron    sodium chloride    [COMPLETED] Insert Peripheral IV **AND** sodium chloride    sodium chloride    sodium chloride  Continuous Infusions:       Assessment & Plan   Active Hospital Problems    Diagnosis  POA    **Syncope [R55]  Yes    Abnormal CT of the chest [R93.89]  Yes    ESRD (end stage renal disease) [N18.6]  Yes    History of stroke [Z86.73]  Not Applicable    Anemia, chronic disease [D63.8]  Yes    Essential hypertension [I10]  Yes    Coronary artery disease involving native coronary artery of native heart without angina pectoris [I25.10]  Yes    Chronic combined systolic and diastolic CHF (congestive heart failure) [I50.42]  Yes    Acquired hypothyroidism [E03.9]  Yes    Type 2 diabetes mellitus, with long-term current use of insulin [E11.9, Z79.4]  Not Applicable      Resolved Hospital Problems   No resolved problems to display.       Assessment & Plan    -He was found to be orthostatic yesterday.  Given IV fluids.  Nephrology planning to adjust his dry weight with dialysis tomorrow.  Carvedilol dose has been decreased  -Repeat orthostatics today  -Appreciate input from cardiology and nephrology  -Heart rate is controlled and continues on Eliquis  -Blood sugars controlled  and will continue present regimen  -Therapy services and discharge planning        Expected Discharge Date: 1/5/2024; Expected Discharge Time:      Alexey Haddad MD  [facility]  01/05/24  11:32 EST

## 2024-01-05 NOTE — PROGRESS NOTES
[facility] Progress Note      Patient Name: Carlito Mo  : 1955  MRN: [id number]  Primary Care Physician:  Florencia Caballero MD  Date of admission: 1/3/2024    Subjective     Interval History:   The patient was seen and examined today for follow-up on  ESRD   Feeling better.  Continues to be tired and fatigued.  Denies nausea or vomiting.  Denies any chest pain or shortness of breath.  Orthostatic blood pressure has not been checked.     Review of Systems:   As noted above    Objective     Vitals:   Temp:  [97.5 °F (36.4 °C)-97.8 °F (36.6 °C)] 97.5 °F (36.4 °C)  Heart Rate:  [62-87] 76  Resp:  [16] 16  BP: ()/(46-79) 142/75    Intake/Output Summary (Last 24 hours) at 2024 0942  Last data filed at 2024 0921  Gross per 24 hour   Intake 630 ml   Output 350 ml   Net 280 ml       Physical Exam:    General Appearance: Frail and fatigued.  Skin: warm and dry  HEENT: oral mucosa normal, nonicteric sclera  Neck: supple, no JVD  Lungs: CTA  Heart: RRR, normal S1 and S2  Abdomen: Soft nontender, nondistended.  : no palpable bladder  Extremities: no edema, cyanosis or clubbing  Neuro: normal speech and mental status     Scheduled Meds:     apixaban, 2.5 mg, Oral, Q12H  budesonide-formoterol, 2 puff, Inhalation, BID - RT  buPROPion XL, 150 mg, Oral, Daily  carvedilol, 3.125 mg, Oral, BID With Meals  insulin glargine, 2 Units, Subcutaneous, Nightly  insulin lispro, 2-7 Units, Subcutaneous, 4x Daily AC & at Bedtime  levothyroxine, 75 mcg, Oral, Daily  pantoprazole, 40 mg, Oral, BID AC  rosuvastatin, 10 mg, Oral, Daily  senna-docusate sodium, 2 tablet, Oral, BID  sodium chloride, 10 mL, Intravenous, Q12H  tamsulosin, 0.4 mg, Oral, Daily      IV Meds:        Results Reviewed:   I have personally reviewed the results from the time of this admission to 2024 09:42 EST     Results from last 7 days   Lab Units 24  0303 24  0610 24  2320   SODIUM mmol/L 137 145  142   POTASSIUM mmol/L 4.2 4.3 4.9   CHLORIDE mmol/L 100 101 98   CO2 mmol/L 25.8 27.0 21.6*   BUN mg/dL 53* 44* 40*   CREATININE mg/dL 4.94* 4.85* 4.64*   CALCIUM mg/dL 8.5* 9.5 9.8   BILIRUBIN mg/dL  --   --  0.4   ALK PHOS U/L  --   --  98   ALT (SGPT) U/L  --   --  33   AST (SGOT) U/L  --   --  52*   GLUCOSE mg/dL 156* 147* 173*       Estimated Creatinine Clearance: 12.9 mL/min (A) (by C-G formula based on SCr of 4.94 mg/dL (H)).    Results from last 7 days   Lab Units 01/05/24  0303 01/03/24  2320   MAGNESIUM mg/dL  --  2.4   PHOSPHORUS mg/dL 4.6*  --              Results from last 7 days   Lab Units 01/05/24  0303 01/04/24  0610 01/03/24  2320   WBC 10*3/mm3 5.30 4.18 5.01   HEMOGLOBIN g/dL 9.2* 10.9* 11.4*   PLATELETS 10*3/mm3 245 287 314       Results from last 7 days   Lab Units 01/04/24  0610 01/03/24  2320   INR  1.54* 1.54*       Assessment / Plan     ASSESSMENT:  End-stage renal disease on maintenance hemodialysis on Tuesday Thursday and Saturday.    Hypertension with CKD. Orthostatic yesterday awaiting orthostatic blood pressure today.  Type 2 diabetes mellitus with CKD  Recent syncope.  Likely secondary to hypovolemia after HD.  Cannot rule out arrhythmia.  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  Seems to be compensated  Paroxysmal atrial fibrillation  Anemia of chronic kidney disease           PLAN:  No indication for dialysis today.  Will dialyze tomorrow per schedule.  Continue current dose of carvedilol 3.125 mg twice daily.  If  he continues to be orthostatic may add midodrine  Appreciate cardiology involvement.  Check iron panel  I did call the dialysis unit and increase his estimated dry weight by 1 kg noted to prevent further hypotensive episode and syncope      Thank you for involving us in the care of Carlito Mo.  Please feel free to call with any questions.    Ayad Bangura MD  01/05/24  09:42 EST    [facility]  [phone]    Parts of this note may be an electronic transcription/translation of spoken language to printed text using the Dragon dictation system.

## 2024-01-05 NOTE — PROGRESS NOTES
"CC: Follow-up syncope, atrial fibrillation, congestive heart failure, cardiomyopathy and coronary artery disease    Interval History: He reports dizziness with standing.  He is off balance and denies chest pain.  He denies shortness of breath      Vital Signs  Temp:  [97.5 °F (36.4 °C)-97.8 °F (36.6 °C)] 97.5 °F (36.4 °C)  Heart Rate:  [62-87] 76  Resp:  [16] 16  BP: ()/(46-79) 142/75    Intake/Output Summary (Last 24 hours) at 1/5/2024 0941  Last data filed at 1/5/2024 0921  Gross per 24 hour   Intake 630 ml   Output 350 ml   Net 280 ml     Flowsheet Rows      Flowsheet Row First Filed Value   Admission Height 167.6 cm (66\") Documented at 01/03/2024 2250   Admission Weight 64.9 kg (143 lb) Documented at 01/03/2024 2250            PHYSICAL EXAM:  General: No acute distress  Resp:NL Rate, unlabored, diminished bases  CV:NL rate and rhythm, NL PMI, Nl S1 and S2, no Murmur, no gallop, no rub, No JVD. Normal pedal pulses  ABD:Nl sounds, no masses or tenderness, nondistended, no guarding or rebound  Neuro: alert,cooperative and oriented  Extr: No edema or cyanosis, moves all extremities      Results Review:    Results from last 7 days   Lab Units 01/05/24  0303   SODIUM mmol/L 137   POTASSIUM mmol/L 4.2   CHLORIDE mmol/L 100   CO2 mmol/L 25.8   BUN mg/dL 53*   CREATININE mg/dL 4.94*   GLUCOSE mg/dL 156*   CALCIUM mg/dL 8.5*     Results from last 7 days   Lab Units 01/04/24  0210 01/03/24  2320   HSTROP T ng/L 175* 179*     Results from last 7 days   Lab Units 01/05/24  0303   WBC 10*3/mm3 5.30   HEMOGLOBIN g/dL 9.2*   HEMATOCRIT % 29.0*   PLATELETS 10*3/mm3 245     Results from last 7 days   Lab Units 01/04/24  0610 01/03/24  2320   INR  1.54* 1.54*   APTT seconds  --  31.2         Results from last 7 days   Lab Units 01/03/24  2320   MAGNESIUM mg/dL 2.4         I reviewed the patient's new clinical results.  I personally viewed and interpreted the patient's EKG/Telemetry data-normal sinus rhythm        Medication " Review:   Meds reviewed         Assessment/Plan    1.  Syncope, likely secondary to orthostatic hypotension following dialysis. S/p IV fluid and coreg was decreased   2.  Chronically elevated troponin secondary to renal disease  3.  Paroxysmal atrial fibrillation (more persistent recently)  4.  Chronic combined CHF, euvolemic currently.  Goal-directed medical therapy is limited with end-stage renal disease and low blood pressure with orthostatic hypotension.  Continue carvedilol  5.  Cardiomyopathy with varying ejection fractions in the past, EF 40 to 45% on 3/8/2023  6.  Recent coffee-ground emesis in December 2023, no obvious bleeding source by EGD on 12/4/2023  7.  Coronary artery disease status post CABG in 2001  8.  History of embolic CVA, on chronic anticoagulation  9.  End-stage renal disease, on hemodialysis  10.  COPD without acute exacerbation (on home O2)  11.  Anemia of chronic disease-hemoglobin stable  12.  Right bundle branch block, chronic  13.  Longstanding diabetes  14.  Deconditioning and frailty     Continue lower dose of carvedilol at this time and observe vital sign trends.  His pulse remained stable.  He is in normal sinus rhythm at this time    JI Triplett  01/05/24  09:41 EST

## 2024-01-06 LAB
ALBUMIN SERPL-MCNC: 3.4 G/DL (ref 3.5–5.2)
ANION GAP SERPL CALCULATED.3IONS-SCNC: 10.7 MMOL/L (ref 5–15)
BUN SERPL-MCNC: 56 MG/DL (ref 8–23)
BUN/CREAT SERPL: 13.2 (ref 7–25)
CALCIUM SPEC-SCNC: 8.5 MG/DL (ref 8.6–10.5)
CHLORIDE SERPL-SCNC: 101 MMOL/L (ref 98–107)
CO2 SERPL-SCNC: 22.3 MMOL/L (ref 22–29)
CREAT SERPL-MCNC: 4.25 MG/DL (ref 0.76–1.27)
DEPRECATED RDW RBC AUTO: 49.9 FL (ref 37–54)
EGFRCR SERPLBLD CKD-EPI 2021: 14.4 ML/MIN/1.73
ERYTHROCYTE [DISTWIDTH] IN BLOOD BY AUTOMATED COUNT: 16 % (ref 12.3–15.4)
FERRITIN SERPL-MCNC: 378 NG/ML (ref 30–400)
GLUCOSE BLDC GLUCOMTR-MCNC: 117 MG/DL (ref 70–130)
GLUCOSE BLDC GLUCOMTR-MCNC: 129 MG/DL (ref 70–130)
GLUCOSE BLDC GLUCOMTR-MCNC: 178 MG/DL (ref 70–130)
GLUCOSE BLDC GLUCOMTR-MCNC: 92 MG/DL (ref 70–130)
GLUCOSE SERPL-MCNC: 131 MG/DL (ref 65–99)
HCT VFR BLD AUTO: 30.6 % (ref 37.5–51)
HGB BLD-MCNC: 9.6 G/DL (ref 13–17.7)
IRON 24H UR-MRATE: 37 MCG/DL (ref 59–158)
IRON SATN MFR SERPL: 14 % (ref 20–50)
MCH RBC QN AUTO: 27.1 PG (ref 26.6–33)
MCHC RBC AUTO-ENTMCNC: 31.4 G/DL (ref 31.5–35.7)
MCV RBC AUTO: 86.4 FL (ref 79–97)
PHOSPHATE SERPL-MCNC: 4.7 MG/DL (ref 2.5–4.5)
PLATELET # BLD AUTO: 239 10*3/MM3 (ref 140–450)
PMV BLD AUTO: 10.3 FL (ref 6–12)
POTASSIUM SERPL-SCNC: 4.2 MMOL/L (ref 3.5–5.2)
RBC # BLD AUTO: 3.54 10*6/MM3 (ref 4.14–5.8)
SODIUM SERPL-SCNC: 134 MMOL/L (ref 136–145)
TIBC SERPL-MCNC: 270 MCG/DL (ref 298–536)
TRANSFERRIN SERPL-MCNC: 181 MG/DL (ref 200–360)
WBC NRBC COR # BLD AUTO: 5.04 10*3/MM3 (ref 3.4–10.8)

## 2024-01-06 PROCEDURE — 82948 REAGENT STRIP/BLOOD GLUCOSE: CPT

## 2024-01-06 PROCEDURE — 80069 RENAL FUNCTION PANEL: CPT | Performed by: HOSPITALIST

## 2024-01-06 PROCEDURE — 84466 ASSAY OF TRANSFERRIN: CPT | Performed by: HOSPITALIST

## 2024-01-06 PROCEDURE — 63710000001 INSULIN GLARGINE PER 5 UNITS: Performed by: NURSE PRACTITIONER

## 2024-01-06 PROCEDURE — 99232 SBSQ HOSP IP/OBS MODERATE 35: CPT | Performed by: NURSE PRACTITIONER

## 2024-01-06 PROCEDURE — 97530 THERAPEUTIC ACTIVITIES: CPT

## 2024-01-06 PROCEDURE — 85027 COMPLETE CBC AUTOMATED: CPT | Performed by: INTERNAL MEDICINE

## 2024-01-06 PROCEDURE — 94761 N-INVAS EAR/PLS OXIMETRY MLT: CPT

## 2024-01-06 PROCEDURE — 94760 N-INVAS EAR/PLS OXIMETRY 1: CPT

## 2024-01-06 PROCEDURE — 83540 ASSAY OF IRON: CPT | Performed by: HOSPITALIST

## 2024-01-06 PROCEDURE — 94799 UNLISTED PULMONARY SVC/PX: CPT

## 2024-01-06 PROCEDURE — 82728 ASSAY OF FERRITIN: CPT | Performed by: HOSPITALIST

## 2024-01-06 RX ADMIN — APIXABAN 2.5 MG: 2.5 TABLET, FILM COATED ORAL at 09:46

## 2024-01-06 RX ADMIN — TAMSULOSIN HYDROCHLORIDE 0.4 MG: 0.4 CAPSULE ORAL at 09:47

## 2024-01-06 RX ADMIN — BUDESONIDE AND FORMOTEROL FUMARATE DIHYDRATE 2 PUFF: 160; 4.5 AEROSOL RESPIRATORY (INHALATION) at 21:06

## 2024-01-06 RX ADMIN — CARVEDILOL 3.12 MG: 3.12 TABLET, FILM COATED ORAL at 18:18

## 2024-01-06 RX ADMIN — CARVEDILOL 3.12 MG: 3.12 TABLET, FILM COATED ORAL at 09:46

## 2024-01-06 RX ADMIN — PANTOPRAZOLE SODIUM 40 MG: 40 TABLET, DELAYED RELEASE ORAL at 06:45

## 2024-01-06 RX ADMIN — Medication 10 ML: at 09:47

## 2024-01-06 RX ADMIN — BUPROPION HYDROCHLORIDE 150 MG: 150 TABLET, EXTENDED RELEASE ORAL at 09:46

## 2024-01-06 RX ADMIN — ROSUVASTATIN CALCIUM 10 MG: 10 TABLET, FILM COATED ORAL at 09:46

## 2024-01-06 RX ADMIN — APIXABAN 2.5 MG: 2.5 TABLET, FILM COATED ORAL at 21:39

## 2024-01-06 RX ADMIN — PANTOPRAZOLE SODIUM 40 MG: 40 TABLET, DELAYED RELEASE ORAL at 18:20

## 2024-01-06 RX ADMIN — LEVOTHYROXINE SODIUM 75 MCG: 75 TABLET ORAL at 06:45

## 2024-01-06 RX ADMIN — INSULIN GLARGINE 2 UNITS: 100 INJECTION, SOLUTION SUBCUTANEOUS at 21:39

## 2024-01-06 RX ADMIN — Medication 10 ML: at 21:39

## 2024-01-06 NOTE — PROGRESS NOTES
"CC:  Follow-up syncope, atrial fibrillation, congestive heart failure, cardiomyopathy and coronary artery disease       Interval History: Patient states he has been less dizzy/lightheaded overnight.  Complains of uncomfortable bed but otherwise no other complaints      Vital Signs  Temp:  [97.7 °F (36.5 °C)] 97.7 °F (36.5 °C)  Heart Rate:  [59-72] 62  Resp:  [20] 20  BP: (128-145)/(65-68) 139/68    Intake/Output Summary (Last 24 hours) at 1/6/2024 1156  Last data filed at 1/6/2024 0946  Gross per 24 hour   Intake --   Output 1450 ml   Net -1450 ml     Flowsheet Rows      Flowsheet Row First Filed Value   Admission Height 167.6 cm (66\") Documented at 01/03/2024 2250   Admission Weight 64.9 kg (143 lb) Documented at 01/03/2024 2250            PHYSICAL EXAM:  General: No acute distress  Resp:NL Rate, unlabored, diminished  CV:NL rate and regularly irregular rhythm, NL PMI, Nl S1 and S2, no Murmur, no gallop, no rub, No JVD. Normal pedal pulses  ABD:Nl sounds, no masses or tenderness, nondistended, no guarding or rebound  Neuro: alert,cooperative and oriented  Extr: No edema or cyanosis, moves all extremities      Results Review:    Results from last 7 days   Lab Units 01/06/24  0312   SODIUM mmol/L 134*   POTASSIUM mmol/L 4.2   CHLORIDE mmol/L 101   CO2 mmol/L 22.3   BUN mg/dL 56*   CREATININE mg/dL 4.25*   GLUCOSE mg/dL 131*   CALCIUM mg/dL 8.5*     Results from last 7 days   Lab Units 01/04/24  0210 01/03/24  2320   HSTROP T ng/L 175* 179*     Results from last 7 days   Lab Units 01/06/24  0312   WBC 10*3/mm3 5.04   HEMOGLOBIN g/dL 9.6*   HEMATOCRIT % 30.6*   PLATELETS 10*3/mm3 239     Results from last 7 days   Lab Units 01/04/24  0610 01/03/24  2320   INR  1.54* 1.54*   APTT seconds  --  31.2         Results from last 7 days   Lab Units 01/03/24  2320   MAGNESIUM mg/dL 2.4         I reviewed the patient's new clinical results.  I personally viewed and interpreted the patient's EKG/Telemetry data- a fib      "   Medication Review:   Meds reviewed         Assessment/Plan    1.  Syncope, likely secondary to orthostatic hypotension following dialysis. S/p IV fluid and coreg was decreased   - agree with midodrine if recurrent hypotension occurs   2.  Chronically elevated troponin secondary to renal disease  3.  Paroxysmal atrial fibrillation (more persistent recently)  4.  Chronic combined CHF, euvolemic currently.  Goal-directed medical therapy is limited with end-stage renal disease and low blood pressure with orthostatic hypotension.  Continue carvedilol  5.  Cardiomyopathy with varying ejection fractions in the past, EF 40 to 45% on 3/8/2023  6.  Recent coffee-ground emesis in December 2023, no obvious bleeding source by EGD on 12/4/2023  7.  Coronary artery disease status post CABG in 2001  8.  History of embolic CVA, on chronic anticoagulation  9.  End-stage renal disease, on hemodialysis  10.  COPD without acute exacerbation (on home O2)  11.  Anemia of chronic disease-hemoglobin stable  12.  Right bundle branch block, chronic  13.  Longstanding diabetes  14.  Deconditioning and frailty    -Plan for hemodialysis.  -Continue carvedilol 3.125 mg twice daily and if needed midodrine would be a good option to add to help support blood pressure  -I did not make any changes and we will continue to follow.   - I would favor observing his blood pressure response overnight and considering discharge tomorrow    JI Triplett  01/06/24  11:56 EST

## 2024-01-06 NOTE — PROGRESS NOTES
" LOS: 1 day     Name: Carlito Mo  Age: 68 y.o.  Sex: male  :  1955  MRN: [id number]         Primary Care Physician: Florencia Caballero MD    Subjective   Subjective  Patient denies any new concerns.  States \"I just feel tired\".  Headed to dialysis suite for HD today.  Denies any chest pain, shortness of breath, abdominal pain, nausea, or vomiting.  Did have a bowel movement he reports.  RN reports he has had a total of 3 small soft BMs.  No further episodes of syncope or near syncope.  Remains weak    Objective   Vital Signs  Temp:  [97.6 °F (36.4 °C)-97.7 °F (36.5 °C)] 97.6 °F (36.4 °C)  Heart Rate:  [59-72] 62  Resp:  [18-20] 18  BP: (124-145)/(63-68) 124/63  Body mass index is 19.38 kg/m².    Objective:  General Appearance:  Comfortable and in no acute distress (Chronically ill-appearing).    Vital signs: (most recent): Blood pressure 124/63, pulse 62, temperature 97.6 °F (36.4 °C), temperature source Oral, resp. rate 18, height 182.9 cm (72\"), weight 64.8 kg (142 lb 13.7 oz), SpO2 100%.    Lungs:  Normal effort and normal respiratory rate.  He is not in respiratory distress.  There are decreased breath sounds.  (Poor inspiratory effort)  Heart: Normal rate.  Regular rhythm.    Abdomen: Abdomen is soft.  Bowel sounds are normal.   There is no abdominal tenderness.     Extremities: There is dependent edema and local swelling.  (Trace bilateral edema)  Pulses: Distal pulses are intact.    Neurological: Patient is alert and oriented to person, place and time.  (Some generalized weakness noted to lower extremities.  Nonfocal on exam).    Skin:  Warm and dry.                Results Review:       I reviewed the patient's new clinical results.    Results from last 7 days   Lab Units 24  0312 24  0303 24  0610 24  2320   WBC 10*3/mm3 5.04 5.30 4.18 5.01   HEMOGLOBIN g/dL 9.6* 9.2* 10.9* 11.4*   PLATELETS 10*3/mm3 239 245 287 314     Results from last 7 days   Lab Units 24  0312 " 01/05/24  0303 01/04/24  0610 01/03/24  2320   SODIUM mmol/L 134* 137 145 142   POTASSIUM mmol/L 4.2 4.2 4.3 4.9   CHLORIDE mmol/L 101 100 101 98   CO2 mmol/L 22.3 25.8 27.0 21.6*   BUN mg/dL 56* 53* 44* 40*   CREATININE mg/dL 4.25* 4.94* 4.85* 4.64*   CALCIUM mg/dL 8.5* 8.5* 9.5 9.8   GLUCOSE mg/dL 131* 156* 147* 173*     Results from last 7 days   Lab Units 01/04/24  0610 01/03/24  2320   INR  1.54* 1.54*       Lab Results   Component Value Date    HGBA1C 6.90 (H) 12/02/2023    HGBA1C 6.60 (H) 05/03/2023    HGBA1C 6.60 (H) 03/07/2023     Glucose   Date/Time Value Ref Range Status   01/06/2024 1119 129 70 - 130 mg/dL Final   01/06/2024 0601 92 70 - 130 mg/dL Final   01/05/2024 2122 153 (H) 70 - 130 mg/dL Final   01/05/2024 1649 116 70 - 130 mg/dL Final   01/05/2024 1119 163 (H) 70 - 130 mg/dL Final   01/05/2024 0623 108 70 - 130 mg/dL Final   01/04/2024 2037 128 70 - 130 mg/dL Final   01/04/2024 1651 185 (H) 70 - 130 mg/dL Final         Scheduled Meds:   apixaban, 2.5 mg, Oral, Q12H  budesonide-formoterol, 2 puff, Inhalation, BID - RT  buPROPion XL, 150 mg, Oral, Daily  carvedilol, 3.125 mg, Oral, BID With Meals  insulin glargine, 2 Units, Subcutaneous, Nightly  insulin lispro, 2-7 Units, Subcutaneous, 4x Daily AC & at Bedtime  levothyroxine, 75 mcg, Oral, Daily  pantoprazole, 40 mg, Oral, BID AC  rosuvastatin, 10 mg, Oral, Daily  senna-docusate sodium, 2 tablet, Oral, BID  sodium chloride, 10 mL, Intravenous, Q12H  tamsulosin, 0.4 mg, Oral, Daily      PRN Meds:     acetaminophen **OR** acetaminophen **OR** acetaminophen    senna-docusate sodium **AND** polyethylene glycol **AND** bisacodyl **AND** bisacodyl    dextrose    dextrose    famotidine    glucagon (human recombinant)    nitroglycerin    ondansetron    sodium chloride    [COMPLETED] Insert Peripheral IV **AND** sodium chloride    sodium chloride    sodium chloride  Continuous Infusions:       Assessment & Plan   Active Hospital Problems    Diagnosis  POA     **Syncope [R55]  Yes    Abnormal CT of the chest [R93.89]  Yes    ESRD (end stage renal disease) [N18.6]  Yes    History of stroke [Z86.73]  Not Applicable    Anemia, chronic disease [D63.8]  Yes    Essential hypertension [I10]  Yes    Coronary artery disease involving native coronary artery of native heart without angina pectoris [I25.10]  Yes    Chronic combined systolic and diastolic CHF (congestive heart failure) [I50.42]  Yes    Acquired hypothyroidism [E03.9]  Yes    Type 2 diabetes mellitus, with long-term current use of insulin [E11.9, Z79.4]  Not Applicable      Resolved Hospital Problems   No resolved problems to display.       Assessment & Plan    -He was found to be orthostatic 1/5/2023.  He was given given IV fluids.  Nephrology planning to adjust his dry weight with dialysis for today.  Carvedilol dose has been decreased  -Repeat orthostatics pending today.  -Appreciate input from cardiology and nephrology  -Heart rate is controlled and continues on Eliquis  -Blood sugars controlled and will continue present regimen  -Therapy services and discharge planning following.        Expected Discharge Date: 1/6/2024; Expected Discharge Time: To be determined.    JI Chau  [facility]  01/06/24  11:32 EST

## 2024-01-06 NOTE — THERAPY PROGRESS REPORT/RE-CERT
Patient Name: Carlito Mo  : 1955    MRN: [id number]                              Today's Date: 2024       Admit Date: 1/3/2024    Visit Dx:     ICD-10-CM ICD-9-CM   1. Syncope, unspecified syncope type  R55 780.2   2. ESRD (end stage renal disease)  N18.6 585.6     Patient Active Problem List   Diagnosis    Major depressive disorder with single episode, in partial remission    Other hyperlipidemia    Type 2 diabetes mellitus, with long-term current use of insulin    Vitamin D deficiency    Erectile dysfunction    Chronic fatigue    Type 2 diabetes mellitus with ophthalmic complication    Skin lesion of chest wall    Slow transit constipation    Benign prostatic hyperplasia with nocturia    History of basal cell carcinoma    High blood pressure associated with diabetes    Acquired hypothyroidism    Hypertensive urgency    Recurrent strokes    Noncompliance w/medication treatment due to intermit use of medication    Urinary retention    Pneumonia    Chronic combined systolic and diastolic CHF (congestive heart failure)    Acute respiratory failure with hypoxia    Suspected sleep apnea    Pleural effusion    YAW (obstructive sleep apnea)    Coronary artery disease involving native coronary artery of native heart without angina pectoris    Chronically elevated troponin    Colon cancer screening    Enlarged lymph nodes    Functional gait abnormality    History of myocardial infarction    Hospital discharge follow-up    Insomnia    Iron deficiency anemia    Major depression, recurrent    Anemia, chronic renal failure, stage 3 (moderate)    Essential hypertension    Adverse effect of iron and its compounds, initial encounter    Acute on chronic renal insufficiency    Debility    Falls frequently    Acute pain of right shoulder    Acute on chronic anemia    Heme positive stool    Neurogenic orthostatic hypotension    Anemia, chronic disease    History of colon polyps    Helicobacter pylori gastritis     Esophagitis    Sleep related hypoxia    Embolic stroke    Patent foramen ovale    Pleural effusion, bilateral    Cardiomyopathy    Lower abdominal pain    Diarrhea    CKD (chronic kidney disease) stage 4, GFR 15-29 ml/min    Hypertension not at goal    Memory loss due to medical condition    Generalized weakness    ERRONEOUS ENCOUNTER--DISREGARD    Weakness    Paroxysmal atrial fibrillation    Chronic anticoagulation    Multiple falls    Dehydration    SYLVAIN (acute kidney injury)    Acute ischemic stroke    Acute combined systolic and diastolic congestive heart failure    History of stroke    Iron deficiency anemia    Acute HFrEF (heart failure with reduced ejection fraction)    Atrial fibrillation with rapid ventricular response    ESRD (end stage renal disease)    Nausea & vomiting    Hemoptysis    Coffee ground emesis    Severe malnutrition    Chronic HFrEF (heart failure with reduced ejection fraction)    Syncope    Abnormal CT of the chest     Past Medical History:   Diagnosis Date    Acquired hypothyroidism     Acute congestive heart failure     Acute respiratory failure with hypoxia     Acute sinusitis     SYLVAIN (acute kidney injury)     on CKD    Anemia     Arthritis     CAD (coronary artery disease)     Cancer     skin    Chronic combined systolic and diastolic congestive heart failure     Chronic ischemic heart disease     Chronic kidney disease (CKD)     CKD (chronic kidney disease)     COPD (chronic obstructive pulmonary disease)     Depression     Diabetes mellitus type 2, uncontrolled, without complications     Diabetic neuropathy     Disease of thyroid gland     Elevated cholesterol     Fatigue     Frequent PVCs     Generalized weakness     GERD (gastroesophageal reflux disease)     Heart attack     History of coronary artery disease     with remote history of bypass surgery in 2001    Hyperlipidemia     Hypertension     Hypertensive encephalopathy     Mental status change     Acute    Nausea & vomiting  12/01/2023    NO DEVICE/RECALLED     Pleural effusion     Pneumonia     Poor historian     RBBB (right bundle branch block)     Stroke     POOR VISION    TIA (transient ischemic attack)     Type 2 diabetes mellitus     Urinary retention     Vitamin D deficiency      Past Surgical History:   Procedure Laterality Date    APPENDECTOMY N/A 02/14/2016    Dr. Alexey Dodson    ARTERIOVENOUS FISTULA/SHUNT SURGERY Right 06/03/2022    Procedure: RIGHT FOREARM ARTERIOVENOUS FISTULA PLACEMENT RADIOCEPHALIC WITH CEPHALIC VEIN TRANSPOSITION;  Surgeon: Eliseo Willis MD;  Location: [facility] MAIN OR;  Service: Vascular;  Laterality: Right;    COLONOSCOPY      over 20 years ago.  no polyps     COLONOSCOPY N/A 09/18/2018    Procedure: COLONOSCOPY;  Surgeon: Jose Enrique Louie MD;  Location: [facility] ENDOSCOPY;  Service: Gastroenterology    COLONOSCOPY N/A 09/19/2018    IH, EH, polyps (TAs w/low grade dysplasia)    COLONOSCOPY N/A 06/01/2020    Procedure: COLONOSCOPY;  Surgeon: Jose Enrique Louie MD;  Location: [facility] ENDOSCOPY;  Service: Gastroenterology;  Laterality: N/A;  Pre op-Anemia, Melena, History of Polyps  Post op-To Cecum/TI, Polyp, Poor Prep    CORONARY ARTERY BYPASS GRAFT  11/2001    ENDOSCOPY N/A 05/29/2020    Procedure: ESOPHAGOGASTRODUODENOSCOPY with biopsies;  Surgeon: Amanda Lovelace MD;  Location: [facility] ENDOSCOPY;  Service: Gastroenterology;  Laterality: N/A;  pre-anemia, dark stools  post-esophagitis, hiatal hernia    ENDOSCOPY N/A 09/15/2020    Procedure: ESOPHAGOGASTRODUODENOSCOPY WITH BIOPSIES;  Surgeon: Jose Enrique Louie MD;  Location: [facility] ENDOSCOPY;  Service: Gastroenterology;  Laterality: N/A;  pre: history of melena and esophagitis  post: mild esophagitis and gastritis, small hiatal hernia    ENDOSCOPY N/A 12/4/2023    Procedure: ESOPHAGOGASTRODUODENOSCOPY with bx;  Surgeon: Quoc Elmore MD;  Location: [facility] ENDOSCOPY;  Service: Gastroenterology;  Laterality: N/A;  pre: Coffee-ground  emesis  post: hiatal hernia, esophagitis    HERNIA REPAIR Left 12/05/2019    THORACENTESIS      TONSILLECTOMY      VASECTOMY        General Information       Row Name 01/06/24 0942          Physical Therapy Time and Intention    Document Type therapy note (daily note)  -CW     Mode of Treatment physical therapy  -CW       Row Name 01/06/24 0942          General Information    Existing Precautions/Restrictions fall  -CW       Row Name 01/06/24 0942          Cognition    Orientation Status (Cognition) oriented x 3  -CW       Row Name 01/06/24 0942          Safety Issues, Functional Mobility    Impairments Affecting Function (Mobility) strength;balance;cognition;endurance/activity tolerance;shortness of breath  -CW               User Key  (r) = Recorded By, (t) = Taken By, (c) = Cosigned By      Initials Name Provider Type    CW Mauricio Baig PTA Physical Therapist Assistant                   Mobility       Row Name 01/06/24 0942          Bed Mobility    Bed Mobility supine-sit  -CW     Supine-Sit [place] (Bed Mobility) supervision;standby assist  -CW     Sit-Supine [place] (Bed Mobility) not tested  -CW     Comment, (Bed Mobility) BSC at the end of PT  -CW       Row Name 01/06/24 0942          Sit-Stand Transfer    Sit-Stand [place] (Transfers) minimum assist (75% patient effort)  -CW     Assistive Device (Sit-Stand Transfers) walker, front-wheeled  -CW       Row Name 01/06/24 0942          Gait/Stairs (Locomotion)    [place] Level (Gait) contact guard  -CW     Assistive Device (Gait) walker, front-wheeled  -CW     Distance in Feet (Gait) 40'  -CW     Deviations/Abnormal Patterns (Gait) gait speed decreased;festinating/shuffling  -CW     Bilateral Gait Deviations heel strike decreased;forward flexed posture  -CW               User Key  (r) = Recorded By, (t) = Taken By, (c) = Cosigned By      Initials Name Provider Type    CW Mauricio Baig PTA Physical Therapist Assistant                    Obj/Interventions    No documentation.                  Goals/Plan    No documentation.                  Clinical Impression       Row Name 01/06/24 0943          Pain    Pretreatment Pain Rating 0/10 - no pain  -CW     Pain Intervention(s) Medication (See MAR);Repositioned;Ambulation/increased activity  -CW       Row Name 01/06/24 0943          Plan of Care Review    Plan of Care Reviewed With patient  -CW     Progress improving  -CW     Outcome Evaluation Pt increased with bed mobility, transfers, and amb safety with RWX.  -CW       Row Name 01/06/24 0943          Therapy Assessment/Plan (PT)    Rehab Potential (PT) good, to achieve stated therapy goals  -CW     Criteria for Skilled Interventions Met (PT) yes  -CW     Therapy Frequency (PT) 6 times/wk  -CW       Row Name 01/06/24 0943          Vital Signs    O2 Delivery Pre Treatment room air  -CW       Row Name 01/06/24 0943          Positioning and Restraints    Pre-Treatment Position in bed  -CW     Post Treatment Position bsc  -CW     On BS commode notified nsg;sitting;encouraged to call for assist  -CW               User Key  (r) = Recorded By, (t) = Taken By, (c) = Cosigned By      Initials Name Provider Type    CW Mauricio Baig P, PTA Physical Therapist Assistant                   Outcome Measures       Row Name 01/06/24 0943          How much help from another person do you currently need...    Turning from your back to your side while in flat bed without using bedrails? 3  -CW     Moving from lying on back to sitting on the side of a flat bed without bedrails? 3  -CW     Moving to and from a bed to a chair (including a wheelchair)? 3  -CW     Standing up from a chair using your arms (e.g., wheelchair, bedside chair)? 3  -CW     Climbing 3-5 steps with a railing? 2  -CW     To walk in hospital room? 3  -CW     AM-PAC 6 Clicks Score (PT) 17  -CW     Highest Level of Mobility Goal 5 --> Static standing  -CW       Row Name 01/06/24 0943           Functional Assessment    Outcome Measure Options AM-PAC 6 Clicks Basic Mobility (PT)  -CW               User Key  (r) = Recorded By, (t) = Taken By, (c) = Cosigned By      Initials Name Provider Type    Mauricio Dewitt PTA Physical Therapist Assistant                                 Physical Therapy Education       Title: PT OT SLP Therapies (In Progress)       Topic: Physical Therapy (In Progress)       Point: Mobility training (In Progress)       Learning Progress Summary             Patient Acceptance, E, NR by AR at 1/5/2024 1537                         Point: Home exercise program (In Progress)       Learning Progress Summary             Patient Acceptance, E, NR by AR at 1/5/2024 1537                         Point: Body mechanics (In Progress)       Learning Progress Summary             Patient Acceptance, E, NR by AR at 1/5/2024 1537                         Point: Precautions (In Progress)       Learning Progress Summary             Patient Acceptance, E, NR by AR at 1/5/2024 1537                                         User Key       Initials Effective Dates Name Provider Type Discipline    AR 06/16/21 -  Kellen Archibald, PT Physical Therapist PT                  PT Recommendation and Plan     Plan of Care Reviewed With: patient  Progress: improving  Outcome Evaluation: Pt increased with bed mobility, transfers, and amb safety with RWX.     Time Calculation:         PT Charges       Row Name 01/06/24 0944             Time Calculation    Start Time 0927  -CW      Stop Time 0944  -CW      Time Calculation (min) 17 min  -CW      PT Received On 01/06/24  -CW      PT - Next Appointment 01/08/24  -CW         Timed Charges    75019 - PT Therapeutic Activity Minutes 17  -CW         Total Minutes    Timed Charges Total Minutes 17  -CW       Total Minutes 17  -CW                User Key  (r) = Recorded By, (t) = Taken By, (c) = Cosigned By      Initials Name Provider Type    Mauriico Dewitt PTA  Physical Therapist Assistant                  Therapy Charges for Today       Code Description Service Date Service Provider Modifiers Qty    47160961527  PT THERAPEUTIC ACT EA 15 MIN 1/6/2024 Mauricio Baig, PTA GP 1            PT G-Codes  Outcome Measure Options: AM-PAC 6 Clicks Basic Mobility (PT)  AM-PAC 6 Clicks Score (PT): 17  PT Discharge Summary  Anticipated Discharge Disposition (PT): home with 24/7 care, home with home health    Mauricio Baig, RUBINA  1/6/2024

## 2024-01-06 NOTE — PLAN OF CARE
Goal Outcome Evaluation:  Plan of Care Reviewed With: patient        Progress: improving  Outcome Evaluation: Pt increased with bed mobility, transfers, and amb safety with RWX.      Anticipated Discharge Disposition (PT): home with 24/7 care, home with home health        Therapist used appropriate personal protective equipment.  Appropriate PPE was worn during the entire therapy session. Hand hygiene was completed before and after therapy session. Patient is not in enhanced precautions.

## 2024-01-06 NOTE — PROGRESS NOTES
[facility] Progress Note      Patient Name: Carlito Mo  : 1955  MRN: [id number]  Primary Care Physician:  Florencia Caballero MD  Date of admission: 1/3/2024    Subjective     Interval History:     Patient attending physical therapy.  Denies any chest pain, shortness of breath, nausea or vomiting    Review of Systems:   As noted above    Objective     Vitals:   Temp:  [97.7 °F (36.5 °C)-97.9 °F (36.6 °C)] 97.7 °F (36.5 °C)  Heart Rate:  [59-72] 72  Resp:  [17-20] 20  BP: (128-145)/(64-67) 128/66    Intake/Output Summary (Last 24 hours) at 2024 0947  Last data filed at 2024 0946  Gross per 24 hour   Intake --   Output 1450 ml   Net -1450 ml       Physical Exam:    General Appearance: Frail and fatigued.  Skin: warm and dry  HEENT: oral mucosa normal, nonicteric sclera  Neck: supple, no JVD  Lungs: CTA  Heart: RRR, normal S1 and S2  Abdomen: Soft nontender, nondistended.  : no palpable bladder  Extremities: no edema, cyanosis or clubbing  Neuro: normal speech and mental status     Scheduled Meds:     apixaban, 2.5 mg, Oral, Q12H  budesonide-formoterol, 2 puff, Inhalation, BID - RT  buPROPion XL, 150 mg, Oral, Daily  carvedilol, 3.125 mg, Oral, BID With Meals  insulin glargine, 2 Units, Subcutaneous, Nightly  insulin lispro, 2-7 Units, Subcutaneous, 4x Daily AC & at Bedtime  levothyroxine, 75 mcg, Oral, Daily  pantoprazole, 40 mg, Oral, BID AC  rosuvastatin, 10 mg, Oral, Daily  senna-docusate sodium, 2 tablet, Oral, BID  sodium chloride, 10 mL, Intravenous, Q12H  tamsulosin, 0.4 mg, Oral, Daily      IV Meds:        Results Reviewed:   I have personally reviewed the results from the time of this admission to 2024 09:47 EST     Results from last 7 days   Lab Units 24  0312 24  0303 24  0610 24  2320   SODIUM mmol/L 134* 137 145 142   POTASSIUM mmol/L 4.2 4.2 4.3 4.9   CHLORIDE mmol/L 101 100 101 98   CO2 mmol/L 22.3 25.8 27.0 21.6*   BUN mg/dL  56* 53* 44* 40*   CREATININE mg/dL 4.25* 4.94* 4.85* 4.64*   CALCIUM mg/dL 8.5* 8.5* 9.5 9.8   BILIRUBIN mg/dL  --   --   --  0.4   ALK PHOS U/L  --   --   --  98   ALT (SGPT) U/L  --   --   --  33   AST (SGOT) U/L  --   --   --  52*   GLUCOSE mg/dL 131* 156* 147* 173*       Estimated Creatinine Clearance: 15.2 mL/min (A) (by C-G formula based on SCr of 4.25 mg/dL (H)).    Results from last 7 days   Lab Units 01/06/24  0312 01/05/24  0303 01/03/24  2320   MAGNESIUM mg/dL  --   --  2.4   PHOSPHORUS mg/dL 4.7* 4.6*  --              Results from last 7 days   Lab Units 01/06/24 0312 01/05/24  0303 01/04/24  0610 01/03/24  2320   WBC 10*3/mm3 5.04 5.30 4.18 5.01   HEMOGLOBIN g/dL 9.6* 9.2* 10.9* 11.4*   PLATELETS 10*3/mm3 239 245 287 314       Results from last 7 days   Lab Units 01/04/24  0610 01/03/24  2320   INR  1.54* 1.54*       Assessment / Plan     ASSESSMENT:  End-stage renal disease on maintenance hemodialysis on Tuesday Thursday and Saturday.    Hypertension with CKD.  Patient was hypotensive yesterday but blood pressure better today  Type 2 diabetes mellitus with CKD  Recent syncope.  Likely secondary to hypovolemia after HD.  Cannot rule out arrhythmia.  Cardiology evaluating   systolic and diastolic heart failure with EF of 41 to 45%.  Seems to be compensated  Paroxysmal atrial fibrillation  Anemia of chronic kidney disease           PLAN:  Hemodialysis today.  Continue current dose of carvedilol 3.125 mg twice daily.  If  he continues to be orthostatic may add midodrine  Dry weight adjusted with dialysis      Thank you for involving us in the care of Carlito Mo.  Please feel free to call with any questions.    Mandeep Duran MD  01/06/24  09:47 EST    [facility]  [phone]    Parts of this note may be an electronic transcription/translation of spoken language to printed text using the Dragon dictation system.

## 2024-01-06 NOTE — NURSING NOTE
Treatment completed tolerated well  good access flow when not moving 0 removed accessed and deaccessed without issue pressure dressing applied

## 2024-01-07 LAB
ALBUMIN SERPL-MCNC: 3.7 G/DL (ref 3.5–5.2)
ANION GAP SERPL CALCULATED.3IONS-SCNC: 13.7 MMOL/L (ref 5–15)
BUN SERPL-MCNC: 26 MG/DL (ref 8–23)
BUN/CREAT SERPL: 11.3 (ref 7–25)
CALCIUM SPEC-SCNC: 9 MG/DL (ref 8.6–10.5)
CHLORIDE SERPL-SCNC: 103 MMOL/L (ref 98–107)
CO2 SERPL-SCNC: 22.3 MMOL/L (ref 22–29)
CREAT SERPL-MCNC: 2.31 MG/DL (ref 0.76–1.27)
DEPRECATED RDW RBC AUTO: 47.6 FL (ref 37–54)
EGFRCR SERPLBLD CKD-EPI 2021: 30 ML/MIN/1.73
ERYTHROCYTE [DISTWIDTH] IN BLOOD BY AUTOMATED COUNT: 15.6 % (ref 12.3–15.4)
GLUCOSE BLDC GLUCOMTR-MCNC: 165 MG/DL (ref 70–130)
GLUCOSE BLDC GLUCOMTR-MCNC: 185 MG/DL (ref 70–130)
GLUCOSE BLDC GLUCOMTR-MCNC: 71 MG/DL (ref 70–130)
GLUCOSE BLDC GLUCOMTR-MCNC: 99 MG/DL (ref 70–130)
GLUCOSE SERPL-MCNC: 74 MG/DL (ref 65–99)
HCT VFR BLD AUTO: 35.1 % (ref 37.5–51)
HGB BLD-MCNC: 10.4 G/DL (ref 13–17.7)
MCH RBC QN AUTO: 25.1 PG (ref 26.6–33)
MCHC RBC AUTO-ENTMCNC: 29.6 G/DL (ref 31.5–35.7)
MCV RBC AUTO: 84.6 FL (ref 79–97)
PHOSPHATE SERPL-MCNC: 3 MG/DL (ref 2.5–4.5)
PLATELET # BLD AUTO: 264 10*3/MM3 (ref 140–450)
PMV BLD AUTO: 10 FL (ref 6–12)
POTASSIUM SERPL-SCNC: 4.3 MMOL/L (ref 3.5–5.2)
RBC # BLD AUTO: 4.15 10*6/MM3 (ref 4.14–5.8)
SODIUM SERPL-SCNC: 139 MMOL/L (ref 136–145)
WBC NRBC COR # BLD AUTO: 4.6 10*3/MM3 (ref 3.4–10.8)

## 2024-01-07 PROCEDURE — 80069 RENAL FUNCTION PANEL: CPT | Performed by: INTERNAL MEDICINE

## 2024-01-07 PROCEDURE — 63710000001 INSULIN GLARGINE PER 5 UNITS: Performed by: NURSE PRACTITIONER

## 2024-01-07 PROCEDURE — 94761 N-INVAS EAR/PLS OXIMETRY MLT: CPT

## 2024-01-07 PROCEDURE — 94799 UNLISTED PULMONARY SVC/PX: CPT

## 2024-01-07 PROCEDURE — 85027 COMPLETE CBC AUTOMATED: CPT | Performed by: INTERNAL MEDICINE

## 2024-01-07 PROCEDURE — 94664 DEMO&/EVAL PT USE INHALER: CPT

## 2024-01-07 PROCEDURE — 94760 N-INVAS EAR/PLS OXIMETRY 1: CPT

## 2024-01-07 PROCEDURE — 82948 REAGENT STRIP/BLOOD GLUCOSE: CPT

## 2024-01-07 PROCEDURE — 99232 SBSQ HOSP IP/OBS MODERATE 35: CPT | Performed by: NURSE PRACTITIONER

## 2024-01-07 RX ADMIN — ROSUVASTATIN CALCIUM 10 MG: 10 TABLET, FILM COATED ORAL at 11:21

## 2024-01-07 RX ADMIN — BUDESONIDE AND FORMOTEROL FUMARATE DIHYDRATE 2 PUFF: 160; 4.5 AEROSOL RESPIRATORY (INHALATION) at 08:01

## 2024-01-07 RX ADMIN — INSULIN GLARGINE 2 UNITS: 100 INJECTION, SOLUTION SUBCUTANEOUS at 19:56

## 2024-01-07 RX ADMIN — CARVEDILOL 3.12 MG: 3.12 TABLET, FILM COATED ORAL at 11:21

## 2024-01-07 RX ADMIN — TAMSULOSIN HYDROCHLORIDE 0.4 MG: 0.4 CAPSULE ORAL at 11:21

## 2024-01-07 RX ADMIN — PANTOPRAZOLE SODIUM 40 MG: 40 TABLET, DELAYED RELEASE ORAL at 06:43

## 2024-01-07 RX ADMIN — APIXABAN 2.5 MG: 2.5 TABLET, FILM COATED ORAL at 11:23

## 2024-01-07 RX ADMIN — Medication 10 ML: at 11:21

## 2024-01-07 RX ADMIN — Medication 10 ML: at 19:56

## 2024-01-07 RX ADMIN — DOCUSATE SODIUM 50MG AND SENNOSIDES 8.6MG 2 TABLET: 8.6; 5 TABLET, FILM COATED ORAL at 11:23

## 2024-01-07 RX ADMIN — DOCUSATE SODIUM 50MG AND SENNOSIDES 8.6MG 2 TABLET: 8.6; 5 TABLET, FILM COATED ORAL at 19:55

## 2024-01-07 RX ADMIN — BUDESONIDE AND FORMOTEROL FUMARATE DIHYDRATE 2 PUFF: 160; 4.5 AEROSOL RESPIRATORY (INHALATION) at 20:42

## 2024-01-07 RX ADMIN — BUPROPION HYDROCHLORIDE 150 MG: 150 TABLET, EXTENDED RELEASE ORAL at 11:21

## 2024-01-07 RX ADMIN — PANTOPRAZOLE SODIUM 40 MG: 40 TABLET, DELAYED RELEASE ORAL at 19:28

## 2024-01-07 RX ADMIN — ACETAMINOPHEN 650 MG: 325 TABLET, FILM COATED ORAL at 19:28

## 2024-01-07 RX ADMIN — APIXABAN 2.5 MG: 2.5 TABLET, FILM COATED ORAL at 19:55

## 2024-01-07 RX ADMIN — CARVEDILOL 3.12 MG: 3.12 TABLET, FILM COATED ORAL at 17:31

## 2024-01-07 RX ADMIN — LEVOTHYROXINE SODIUM 75 MCG: 75 TABLET ORAL at 06:43

## 2024-01-07 NOTE — PLAN OF CARE
Goal Outcome Evaluation:  Plan of Care Reviewed With: patient           Outcome Evaluation: 38 yr old male AOx4 VSS. Hemodialysis completed today without issue. No complaints of dizziness or lightheadedness. Small freq. bm today. Up with assist x1. No acute distress. Continue with plan of care. D/c TBD

## 2024-01-07 NOTE — PROGRESS NOTES
" LOS: 2 days     Name: Carlito Mo  Age: 68 y.o.  Sex: male  :  1955  MRN: [id number]         Primary Care Physician: Florencia Caballero MD    Subjective   Subjective:  Diet:  Adequate intake.  No nausea or vomiting.      Patient reports rectal pain.  Feels the urge to have BM but only has small amount of stool.  RN reports he sits on toilet multiple times a day but only small amount of soft stool. No diarrhea or liquid stools.  States this has been going on for about 1 week at home.    Denies any chest pain, shortness of breath, abdominal pain, nausea, or vomiting.       Objective   Vital Signs  Temp:  [97.7 °F (36.5 °C)-98.9 °F (37.2 °C)] 98.6 °F (37 °C)  Heart Rate:  [62-76] 62  Resp:  [17-20] 18  BP: ()/(51-95) 149/69  Body mass index is 19.04 kg/m².    Objective:  General Appearance:  Comfortable and in no acute distress (Chronically ill-appearing).    Vital signs: (most recent): Blood pressure 157/75, pulse 63, temperature 98.6 °F (37 °C), temperature source Oral, resp. rate 18, height 182.9 cm (72\"), weight 63.7 kg (140 lb 6.4 oz), SpO2 100%.    Lungs:  Normal effort and normal respiratory rate.  He is not in respiratory distress.  There are decreased breath sounds.  (Poor inspiratory effort)  Heart: Normal rate.  Regular rhythm.    Abdomen: Abdomen is soft.  (Rectal exam performed.  Noted large amount of firm stool in rectum about 2 cm up rectum.  Tenderness noted during rectal exam.  ).  Bowel sounds are normal.   There is no abdominal tenderness.     Extremities: There is dependent edema and local swelling.  (Trace bilateral edema)  Pulses: Distal pulses are intact.    Neurological: Patient is alert and oriented to person, place and time.  (Some generalized weakness noted to lower extremities.  Nonfocal on exam).    Skin:  Warm and dry.                Results Review:       I reviewed the patient's new clinical results.    Results from last 7 days   Lab Units 24  0552 24  0312 " 01/05/24  0303 01/04/24  0610 01/03/24  2320   WBC 10*3/mm3 4.60 5.04 5.30 4.18 5.01   HEMOGLOBIN g/dL 10.4* 9.6* 9.2* 10.9* 11.4*   PLATELETS 10*3/mm3 264 239 245 287 314     Results from last 7 days   Lab Units 01/07/24  0552 01/06/24  0312 01/05/24  0303 01/04/24  0610 01/03/24  2320   SODIUM mmol/L 139 134* 137 145 142   POTASSIUM mmol/L 4.3 4.2 4.2 4.3 4.9   CHLORIDE mmol/L 103 101 100 101 98   CO2 mmol/L 22.3 22.3 25.8 27.0 21.6*   BUN mg/dL 26* 56* 53* 44* 40*   CREATININE mg/dL 2.31* 4.25* 4.94* 4.85* 4.64*   CALCIUM mg/dL 9.0 8.5* 8.5* 9.5 9.8   GLUCOSE mg/dL 74 131* 156* 147* 173*     Results from last 7 days   Lab Units 01/04/24  0610 01/03/24  2320   INR  1.54* 1.54*       Lab Results   Component Value Date    HGBA1C 6.90 (H) 12/02/2023    HGBA1C 6.60 (H) 05/03/2023    HGBA1C 6.60 (H) 03/07/2023     Glucose   Date/Time Value Ref Range Status   01/07/2024 1108 165 (H) 70 - 130 mg/dL Final   01/07/2024 0637 71 70 - 130 mg/dL Final   01/06/2024 2137 178 (H) 70 - 130 mg/dL Final   01/06/2024 1803 117 70 - 130 mg/dL Final   01/06/2024 1119 129 70 - 130 mg/dL Final   01/06/2024 0601 92 70 - 130 mg/dL Final   01/05/2024 2122 153 (H) 70 - 130 mg/dL Final   01/05/2024 1649 116 70 - 130 mg/dL Final         Scheduled Meds:   apixaban, 2.5 mg, Oral, Q12H  budesonide-formoterol, 2 puff, Inhalation, BID - RT  buPROPion XL, 150 mg, Oral, Daily  carvedilol, 3.125 mg, Oral, BID With Meals  insulin glargine, 2 Units, Subcutaneous, Nightly  insulin lispro, 2-7 Units, Subcutaneous, 4x Daily AC & at Bedtime  levothyroxine, 75 mcg, Oral, Daily  pantoprazole, 40 mg, Oral, BID AC  rosuvastatin, 10 mg, Oral, Daily  senna-docusate sodium, 2 tablet, Oral, BID  sodium chloride, 10 mL, Intravenous, Q12H  tamsulosin, 0.4 mg, Oral, Daily      PRN Meds:     acetaminophen **OR** acetaminophen **OR** acetaminophen    senna-docusate sodium **AND** polyethylene glycol **AND** bisacodyl **AND** bisacodyl    dextrose    dextrose     famotidine    glucagon (human recombinant)    nitroglycerin    ondansetron    sodium chloride    [COMPLETED] Insert Peripheral IV **AND** sodium chloride    sodium chloride    sodium chloride  Continuous Infusions:       Assessment & Plan   Active Hospital Problems    Diagnosis  POA    **Syncope [R55]  Yes    Abnormal CT of the chest [R93.89]  Yes    ESRD (end stage renal disease) [N18.6]  Yes    History of stroke [Z86.73]  Not Applicable    Anemia, chronic disease [D63.8]  Yes    Essential hypertension [I10]  Yes    Coronary artery disease involving native coronary artery of native heart without angina pectoris [I25.10]  Yes    Chronic combined systolic and diastolic CHF (congestive heart failure) [I50.42]  Yes    Acquired hypothyroidism [E03.9]  Yes    Type 2 diabetes mellitus, with long-term current use of insulin [E11.9, Z79.4]  Not Applicable      Resolved Hospital Problems   No resolved problems to display.       Assessment & Plan    -He was found to be orthostatic 1/5/2023.  He was given given IV fluids.  Nephrology adjusted his dry weight with dialysis  on Saturday.  His BP is very labile.  Staff unable to get orthostatics today d/t pt's desire to frequent the bathroom.  They report he is having small soft stools but no diarrhea.  Carvedilol dose has been decreased  -Appreciate input from cardiology and nephrology.  Cardiology is recommending Zios patch at discharge.    -Heart rate is controlled and continues on Eliquis  -Blood sugars controlled and will continue present regimen  -Therapy services and discharge planning following.  - PT is recommending home with 24/7 care with home health.    - soap suds enema ordered.  Reevaluate results in am.   Continue bowel regimen.  Abd exam benign.  WBC normal.    - he shared that one of his syncopal episodes did occur while on the toilet trying to have BM PTA.         Expected Discharge Date: 1/6/2024; Expected Discharge Time: To be determined.    Irene Barraza,  JI  [facility]  01/07/24  11:32 EST

## 2024-01-07 NOTE — PLAN OF CARE
Goal Outcome Evaluation:  Plan of Care Reviewed With: patient        Progress: improving    Outcome Evaluation: pt is AO x3, forgetful at times. All vs stable. No complaints of pain, SOA, dizziness. Sleep well overnihgt. No distress noted overnight. Will continue to monitor.       Problem: Adult Inpatient Plan of Care  Goal: Plan of Care Review  Outcome: Ongoing, Progressing  Flowsheets (Taken 1/7/2024 0676)  Progress: improving  Plan of Care Reviewed With: patient  Outcome Evaluation: pt is AO x3, forgetful at times. All vs stable. Negative Orthostatic BP. No complaints of pain, SOA, dizziness. Sleep well overnihgt. No distress noted overnight. Will continue to monitor.  Goal: Patient-Specific Goal (Individualized)  Outcome: Ongoing, Progressing  Goal: Absence of Hospital-Acquired Illness or Injury  Outcome: Ongoing, Progressing  Intervention: Identify and Manage Fall Risk  Description: Perform standard risk assessment on admission using a validated tool or comprehensive approach appropriate to the patient; reassess fall risk frequently, with change in status or transfer to another level of care.  Communicate fall injury risk to interprofessional healthcare team.  Determine need for increased observation, equipment and environmental modification, such as low bed, signage and supportive, nonskid footwear.  Adjust safety measures to individual developmental age, stage and identified risk factors.  Reinforce the importance of safety and physical activity with patient and family.  Perform regular intentional rounding to assess need for position change, pain assessment and personal needs, including assistance with toileting.  Recent Flowsheet Documentation  Taken 1/7/2024 0447 by Lg Gong, RN  Safety Promotion/Fall Prevention:   activity supervised   fall prevention program maintained   assistive device/personal items within reach   clutter free environment maintained   nonskid shoes/slippers when out of bed    muscle strengthening facilitated   mobility aid in reach   safety round/check completed   room organization consistent   lighting adjusted  Taken 1/7/2024 0200 by Lg Gong RN  Safety Promotion/Fall Prevention:   assistive device/personal items within reach   activity supervised   fall prevention program maintained   clutter free environment maintained   nonskid shoes/slippers when out of bed   muscle strengthening facilitated   room organization consistent   safety round/check completed  Taken 1/7/2024 0000 by Lg Gong RN  Safety Promotion/Fall Prevention:   activity supervised   assistive device/personal items within reach   clutter free environment maintained   fall prevention program maintained   muscle strengthening facilitated   nonskid shoes/slippers when out of bed   mobility aid in reach   safety round/check completed   room organization consistent   lighting adjusted  Taken 1/6/2024 2230 by Lg Gong RN  Safety Promotion/Fall Prevention:   activity supervised   assistive device/personal items within reach   fall prevention program maintained   clutter free environment maintained   nonskid shoes/slippers when out of bed   muscle strengthening facilitated   toileting scheduled   safety round/check completed   room organization consistent  Taken 1/6/2024 2030 by Lg Gong RN  Safety Promotion/Fall Prevention:   assistive device/personal items within reach   activity supervised   fall prevention program maintained   clutter free environment maintained   muscle strengthening facilitated   nonskid shoes/slippers when out of bed   room organization consistent   toileting scheduled   safety round/check completed  Intervention: Prevent Skin Injury  Description: Perform a screening for skin injury risk, such as pressure or moisture associated skin damage on admission and at regular intervals throughout hospital stay.  Keep all areas of skin (especially folds) clean and dry.  Maintain adequate skin  hydration.  Relieve and redistribute pressure and protect bony prominences; implement measures based on patient-specific risk factors.  Match turning and repositioning schedule to clinical condition.  Encourage weight shift frequently; assist with reposition if unable to complete independently.  Float heels off bed; avoid pressure on the Achilles tendon.  Keep skin free from extended contact with medical devices.  Encourage functional activity and mobility, as early as tolerated.  Use aids (e.g., slide boards, mechanical lift) during transfer.  Recent Flowsheet Documentation  Taken 1/7/2024 0433 by Lg Gong RN  Body Position: position changed independently  Taken 1/7/2024 0200 by Lg Gong RN  Body Position: position changed independently  Taken 1/7/2024 0000 by Lg Gong RN  Body Position: position changed independently  Taken 1/6/2024 2230 by Lg Gong RN  Body Position: position changed independently  Taken 1/6/2024 2030 by Lg Gong RN  Body Position: position changed independently  Intervention: Prevent and Manage VTE (Venous Thromboembolism) Risk  Description: Assess for VTE (venous thromboembolism) risk.  Encourage and assist with early ambulation.  Initiate and maintain compression or other therapy, as indicated, based on identified risk in accordance with organizational protocol and provider order.  Encourage both active and passive leg exercises while in bed, if unable to ambulate.  Recent Flowsheet Documentation  Taken 1/7/2024 0433 by Lg Gong RN  Activity Management: activity encouraged  Taken 1/7/2024 0200 by Lg Gong RN  Activity Management: activity encouraged  Taken 1/7/2024 0000 by Lg Gong RN  Activity Management: activity encouraged  Taken 1/6/2024 2230 by Lg Gong RN  Activity Management: activity encouraged  Taken 1/6/2024 2030 by Lg Gong RN  Activity Management: activity encouraged  VTE Prevention/Management:   bilateral   compression stockings  off  Intervention: Prevent Infection  Description: Maintain skin and mucous membrane integrity; promote hand, oral and pulmonary hygiene.  Optimize fluid balance, nutrition, sleep and glycemic control to maximize infection resistance.  Identify potential sources of infection early to prevent or mitigate progression of infection (e.g., wound, lines, devices).  Evaluate ongoing need for invasive devices; remove promptly when no longer indicated.  Recent Flowsheet Documentation  Taken 1/7/2024 0433 by Lg Gong RN  Infection Prevention:   visitors restricted/screened   single patient room provided   rest/sleep promoted   personal protective equipment utilized   environmental surveillance performed   cohorting utilized   hand hygiene promoted   equipment surfaces disinfected  Taken 1/7/2024 0200 by Lg Gong RN  Infection Prevention:   visitors restricted/screened   single patient room provided   rest/sleep promoted   personal protective equipment utilized   hand hygiene promoted   equipment surfaces disinfected   cohorting utilized   environmental surveillance performed  Taken 1/7/2024 0000 by Lg Gong RN  Infection Prevention:   visitors restricted/screened   rest/sleep promoted   single patient room provided   equipment surfaces disinfected   environmental surveillance performed   cohorting utilized   hand hygiene promoted   personal protective equipment utilized  Taken 1/6/2024 2230 by Lg Gong RN  Infection Prevention:   visitors restricted/screened   single patient room provided   rest/sleep promoted   hand hygiene promoted   equipment surfaces disinfected   environmental surveillance performed   cohorting utilized   personal protective equipment utilized  Taken 1/6/2024 2030 by Lg Gong RN  Infection Prevention:   visitors restricted/screened   single patient room provided   rest/sleep promoted   hand hygiene promoted   equipment surfaces disinfected   cohorting utilized   environmental  surveillance performed   personal protective equipment utilized  Goal: Optimal Comfort and Wellbeing  Outcome: Ongoing, Progressing  Intervention: Provide Person-Centered Care  Description: Use a family-focused approach to care.  Develop trust and rapport by proactively providing information, encouraging questions, addressing concerns and offering reassurance.  Acknowledge emotional response to hospitalization.  Recognize and utilize personal coping strategies.  Honor spiritual and cultural preferences.  Recent Flowsheet Documentation  Taken 1/7/2024 0433 by Lg Gong RN  Trust Relationship/Rapport:   thoughts/feelings acknowledged   reassurance provided   questions encouraged   questions answered   empathic listening provided   emotional support provided   care explained   choices provided  Taken 1/6/2024 2030 by Lg Gong RN  Trust Relationship/Rapport:   thoughts/feelings acknowledged   reassurance provided   questions encouraged   questions answered   empathic listening provided   care explained   choices provided   emotional support provided  Goal: Readiness for Transition of Care  Outcome: Ongoing, Progressing

## 2024-01-07 NOTE — PROGRESS NOTES
[facility] Progress Note      Patient Name: Carlito Mo  : 1955  MRN: [id number]  Primary Care Physician:  Florencia Caballero MD  Date of admission: 1/3/2024    Subjective     Interval History:     Patient has diarrhea this morning.  Denies any chest pain, shortness of breath, nausea or vomiting    Review of Systems:   As noted above    Objective     Vitals:   Temp:  [97.6 °F (36.4 °C)-98.9 °F (37.2 °C)] 98.9 °F (37.2 °C)  Heart Rate:  [62-76] 69  Resp:  [17-20] 20  BP: ()/(51-95) 113/60    Intake/Output Summary (Last 24 hours) at 2024 0853  Last data filed at 2024 0611  Gross per 24 hour   Intake 480 ml   Output 770 ml   Net -290 ml       Physical Exam:    General Appearance: Frail and fatigued.  Skin: warm and dry  HEENT: oral mucosa normal, nonicteric sclera  Neck: supple, no JVD  Lungs: CTA  Heart: RRR, normal S1 and S2  Abdomen: Soft nontender, nondistended.  : no palpable bladder  Extremities: no edema, cyanosis or clubbing  Neuro: normal speech and mental status     Scheduled Meds:     apixaban, 2.5 mg, Oral, Q12H  budesonide-formoterol, 2 puff, Inhalation, BID - RT  buPROPion XL, 150 mg, Oral, Daily  carvedilol, 3.125 mg, Oral, BID With Meals  insulin glargine, 2 Units, Subcutaneous, Nightly  insulin lispro, 2-7 Units, Subcutaneous, 4x Daily AC & at Bedtime  levothyroxine, 75 mcg, Oral, Daily  pantoprazole, 40 mg, Oral, BID AC  rosuvastatin, 10 mg, Oral, Daily  senna-docusate sodium, 2 tablet, Oral, BID  sodium chloride, 10 mL, Intravenous, Q12H  tamsulosin, 0.4 mg, Oral, Daily      IV Meds:        Results Reviewed:   I have personally reviewed the results from the time of this admission to 2024 08:53 EST     Results from last 7 days   Lab Units 24  0552 24  0312 24  0303 24  0610 24  2320   SODIUM mmol/L 139 134* 137   < > 142   POTASSIUM mmol/L 4.3 4.2 4.2   < > 4.9   CHLORIDE mmol/L 103 101 100   < > 98   CO2 mmol/L  22.3 22.3 25.8   < > 21.6*   BUN mg/dL 26* 56* 53*   < > 40*   CREATININE mg/dL 2.31* 4.25* 4.94*   < > 4.64*   CALCIUM mg/dL 9.0 8.5* 8.5*   < > 9.8   BILIRUBIN mg/dL  --   --   --   --  0.4   ALK PHOS U/L  --   --   --   --  98   ALT (SGPT) U/L  --   --   --   --  33   AST (SGOT) U/L  --   --   --   --  52*   GLUCOSE mg/dL 74 131* 156*   < > 173*    < > = values in this interval not displayed.       Estimated Creatinine Clearance: 27.6 mL/min (A) (by C-G formula based on SCr of 2.31 mg/dL (H)).    Results from last 7 days   Lab Units 01/07/24 0552 01/06/24 0312 01/05/24 0303 01/03/24  2320   MAGNESIUM mg/dL  --   --   --  2.4   PHOSPHORUS mg/dL 3.0 4.7* 4.6*  --              Results from last 7 days   Lab Units 01/07/24  0552 01/06/24 0312 01/05/24  0303 01/04/24  0610 01/03/24  2320   WBC 10*3/mm3 4.60 5.04 5.30 4.18 5.01   HEMOGLOBIN g/dL 10.4* 9.6* 9.2* 10.9* 11.4*   PLATELETS 10*3/mm3 264 239 245 287 314       Results from last 7 days   Lab Units 01/04/24  0610 01/03/24  2320   INR  1.54* 1.54*       Assessment / Plan     ASSESSMENT:  End-stage renal disease on maintenance hemodialysis on Tuesday Thursday and Saturday.    Hypertension with CKD.  Electrolytes okay  Type 2 diabetes mellitus with CKD  Recent syncope.  Likely secondary to hypovolemia after HD.  Cannot rule out arrhythmia.  Cardiology evaluating   systolic and diastolic heart failure with EF of 41 to 45%.  Seems to be compensated  Paroxysmal atrial fibrillation  Anemia of chronic kidney disease           PLAN:  Hemodialysis TTS.  Continue current dose of carvedilol 3.125 mg twice daily.  Can start midodrine if blood pressure drops  Dry weight adjusted with dialysis      Thank you for involving us in the care of Carlito Mo.  Please feel free to call with any questions.    Mandeep Duran MD  01/07/24  08:53 EST    [facility]  [phone]    Parts of this note may be an electronic transcription/translation of spoken  language to printed text using the Dragon dictation system.

## 2024-01-07 NOTE — PROGRESS NOTES
"CC:  Follow-up syncope, atrial fibrillation, congestive heart failure, cardiomyopathy and coronary artery disease     Interval History: He had dialysis yesterday with no significant hypotension or near syncope.  He feels weak but no other new complaints      Vital Signs  Temp:  [97.6 °F (36.4 °C)-98.9 °F (37.2 °C)] 98.6 °F (37 °C)  Heart Rate:  [62-76] 62  Resp:  [17-20] 18  BP: ()/(51-95) 149/69    Intake/Output Summary (Last 24 hours) at 1/7/2024 1005  Last data filed at 1/7/2024 0804  Gross per 24 hour   Intake 720 ml   Output 295 ml   Net 425 ml     Flowsheet Rows      Flowsheet Row First Filed Value   Admission Height 167.6 cm (66\") Documented at 01/03/2024 2250   Admission Weight 64.9 kg (143 lb) Documented at 01/03/2024 2250            PHYSICAL EXAM:  General: No acute distress  Resp:NL Rate, unlabored, diminished   CV:NL rate and  irregular rhythm, NL PMI, Nl S1 and S2, no Murmur, no gallop, no rub, No JVD. Normal pedal pulses  ABD:Nl sounds, no masses or tenderness, nondistended, no guarding or rebound  Neuro: alert,cooperative and oriented  Extr: No edema or cyanosis, moves all extremities      Results Review:    Results from last 7 days   Lab Units 01/07/24  0552   SODIUM mmol/L 139   POTASSIUM mmol/L 4.3   CHLORIDE mmol/L 103   CO2 mmol/L 22.3   BUN mg/dL 26*   CREATININE mg/dL 2.31*   GLUCOSE mg/dL 74   CALCIUM mg/dL 9.0     Results from last 7 days   Lab Units 01/04/24  0210 01/03/24  2320   HSTROP T ng/L 175* 179*     Results from last 7 days   Lab Units 01/07/24  0552   WBC 10*3/mm3 4.60   HEMOGLOBIN g/dL 10.4*   HEMATOCRIT % 35.1*   PLATELETS 10*3/mm3 264     Results from last 7 days   Lab Units 01/04/24  0610 01/03/24  2320   INR  1.54* 1.54*   APTT seconds  --  31.2         Results from last 7 days   Lab Units 01/03/24  2320   MAGNESIUM mg/dL 2.4         I reviewed the patient's new clinical results.  I personally viewed and interpreted the patient's EKG/Telemetry data- sinus arrhythmia      "    Medication Review:   Meds reviewed         Assessment/Plan    1.  Syncope, likely secondary to orthostatic hypotension following dialysis. S/p IV fluid and coreg was decreased   - agree with midodrine if recurrent hypotension occurs   2.  Chronically elevated troponin secondary to renal disease  3.  Paroxysmal atrial fibrillation (more persistent recently)  4.  Chronic combined CHF, euvolemic currently.  Goal-directed medical therapy is limited with end-stage renal disease and low blood pressure with orthostatic hypotension.  Continue carvedilol  5.  Cardiomyopathy with varying ejection fractions in the past, EF 40 to 45% on 3/8/2023  6.  Recent coffee-ground emesis in December 2023, no obvious bleeding source by EGD on 12/4/2023  7.  Coronary artery disease status post CABG in 2001  8.  History of embolic CVA, on chronic anticoagulation  9.  End-stage renal disease, on hemodialysis Tuesday, Thursday, Saturday  10.  COPD without acute exacerbation (on home O2)  11.  Anemia of chronic disease-hemoglobin stable  12.  Right bundle branch block, chronic  13.  Longstanding diabetes  14.  Deconditioning and frailty       -Continue carvedilol 3.125 mg twice daily   -We will plan Zio patch at discharge ( ordered)  -I did not make any changes and we will continue to follow.       JI Triplett  01/07/24  10:05 EST

## 2024-01-08 LAB
ALBUMIN SERPL-MCNC: 3.3 G/DL (ref 3.5–5.2)
ANION GAP SERPL CALCULATED.3IONS-SCNC: 9.8 MMOL/L (ref 5–15)
BUN SERPL-MCNC: 33 MG/DL (ref 8–23)
BUN/CREAT SERPL: 11.7 (ref 7–25)
CALCIUM SPEC-SCNC: 8.4 MG/DL (ref 8.6–10.5)
CHLORIDE SERPL-SCNC: 102 MMOL/L (ref 98–107)
CO2 SERPL-SCNC: 22.2 MMOL/L (ref 22–29)
CREAT SERPL-MCNC: 2.83 MG/DL (ref 0.76–1.27)
DEPRECATED RDW RBC AUTO: 50.1 FL (ref 37–54)
EGFRCR SERPLBLD CKD-EPI 2021: 23.5 ML/MIN/1.73
ERYTHROCYTE [DISTWIDTH] IN BLOOD BY AUTOMATED COUNT: 15.9 % (ref 12.3–15.4)
GLUCOSE BLDC GLUCOMTR-MCNC: 101 MG/DL (ref 70–130)
GLUCOSE BLDC GLUCOMTR-MCNC: 102 MG/DL (ref 70–130)
GLUCOSE BLDC GLUCOMTR-MCNC: 166 MG/DL (ref 70–130)
GLUCOSE BLDC GLUCOMTR-MCNC: 224 MG/DL (ref 70–130)
GLUCOSE SERPL-MCNC: 113 MG/DL (ref 65–99)
HCT VFR BLD AUTO: 31.6 % (ref 37.5–51)
HGB BLD-MCNC: 9.9 G/DL (ref 13–17.7)
MCH RBC QN AUTO: 27 PG (ref 26.6–33)
MCHC RBC AUTO-ENTMCNC: 31.3 G/DL (ref 31.5–35.7)
MCV RBC AUTO: 86.3 FL (ref 79–97)
PHOSPHATE SERPL-MCNC: 3.1 MG/DL (ref 2.5–4.5)
PLATELET # BLD AUTO: 202 10*3/MM3 (ref 140–450)
PMV BLD AUTO: 9.7 FL (ref 6–12)
POTASSIUM SERPL-SCNC: 4.1 MMOL/L (ref 3.5–5.2)
RBC # BLD AUTO: 3.66 10*6/MM3 (ref 4.14–5.8)
SODIUM SERPL-SCNC: 134 MMOL/L (ref 136–145)
WBC NRBC COR # BLD AUTO: 5.04 10*3/MM3 (ref 3.4–10.8)

## 2024-01-08 PROCEDURE — 94760 N-INVAS EAR/PLS OXIMETRY 1: CPT

## 2024-01-08 PROCEDURE — 94761 N-INVAS EAR/PLS OXIMETRY MLT: CPT

## 2024-01-08 PROCEDURE — 94664 DEMO&/EVAL PT USE INHALER: CPT

## 2024-01-08 PROCEDURE — 85027 COMPLETE CBC AUTOMATED: CPT | Performed by: NURSE PRACTITIONER

## 2024-01-08 PROCEDURE — 94799 UNLISTED PULMONARY SVC/PX: CPT

## 2024-01-08 PROCEDURE — 80069 RENAL FUNCTION PANEL: CPT | Performed by: NURSE PRACTITIONER

## 2024-01-08 PROCEDURE — 63710000001 INSULIN LISPRO (HUMAN) PER 5 UNITS: Performed by: NURSE PRACTITIONER

## 2024-01-08 PROCEDURE — 99232 SBSQ HOSP IP/OBS MODERATE 35: CPT | Performed by: INTERNAL MEDICINE

## 2024-01-08 PROCEDURE — 63710000001 INSULIN GLARGINE PER 5 UNITS: Performed by: NURSE PRACTITIONER

## 2024-01-08 PROCEDURE — 97110 THERAPEUTIC EXERCISES: CPT

## 2024-01-08 PROCEDURE — 82948 REAGENT STRIP/BLOOD GLUCOSE: CPT

## 2024-01-08 RX ADMIN — ROSUVASTATIN CALCIUM 10 MG: 10 TABLET, FILM COATED ORAL at 08:50

## 2024-01-08 RX ADMIN — BUPROPION HYDROCHLORIDE 150 MG: 150 TABLET, EXTENDED RELEASE ORAL at 08:50

## 2024-01-08 RX ADMIN — INSULIN LISPRO 2 UNITS: 100 INJECTION, SOLUTION INTRAVENOUS; SUBCUTANEOUS at 23:06

## 2024-01-08 RX ADMIN — APIXABAN 2.5 MG: 2.5 TABLET, FILM COATED ORAL at 08:50

## 2024-01-08 RX ADMIN — CARVEDILOL 3.12 MG: 3.12 TABLET, FILM COATED ORAL at 17:00

## 2024-01-08 RX ADMIN — INSULIN LISPRO 3 UNITS: 100 INJECTION, SOLUTION INTRAVENOUS; SUBCUTANEOUS at 11:54

## 2024-01-08 RX ADMIN — TAMSULOSIN HYDROCHLORIDE 0.4 MG: 0.4 CAPSULE ORAL at 08:50

## 2024-01-08 RX ADMIN — BUDESONIDE AND FORMOTEROL FUMARATE DIHYDRATE 2 PUFF: 160; 4.5 AEROSOL RESPIRATORY (INHALATION) at 07:53

## 2024-01-08 RX ADMIN — PANTOPRAZOLE SODIUM 40 MG: 40 TABLET, DELAYED RELEASE ORAL at 16:58

## 2024-01-08 RX ADMIN — BUDESONIDE AND FORMOTEROL FUMARATE DIHYDRATE 2 PUFF: 160; 4.5 AEROSOL RESPIRATORY (INHALATION) at 19:54

## 2024-01-08 RX ADMIN — INSULIN GLARGINE 2 UNITS: 100 INJECTION, SOLUTION SUBCUTANEOUS at 23:06

## 2024-01-08 RX ADMIN — Medication 10 ML: at 23:07

## 2024-01-08 RX ADMIN — PANTOPRAZOLE SODIUM 40 MG: 40 TABLET, DELAYED RELEASE ORAL at 06:07

## 2024-01-08 RX ADMIN — APIXABAN 2.5 MG: 2.5 TABLET, FILM COATED ORAL at 23:07

## 2024-01-08 RX ADMIN — Medication 10 ML: at 08:50

## 2024-01-08 RX ADMIN — CARVEDILOL 3.12 MG: 3.12 TABLET, FILM COATED ORAL at 08:50

## 2024-01-08 RX ADMIN — LEVOTHYROXINE SODIUM 75 MCG: 75 TABLET ORAL at 06:07

## 2024-01-08 RX ADMIN — DOCUSATE SODIUM 50MG AND SENNOSIDES 8.6MG 2 TABLET: 8.6; 5 TABLET, FILM COATED ORAL at 23:06

## 2024-01-08 NOTE — PLAN OF CARE
Goal Outcome Evaluation:  Plan of Care Reviewed With: patient        Progress: improving  Outcome Evaluation: Patient doing well this AM. VSS, no s/s of syncope when ambulating. No other complaints noted. WCTM.

## 2024-01-08 NOTE — PROGRESS NOTES
LOS: 3 days   Patient Care Team:  Florencia Caballero MD as PCP - General (Internal Medicine)  Amber Murguia MD as Consulting Physician (Cardiology)  [facility] as Pharmacist  Seth Ladd MD as Surgeon (General Surgery)  Kim Hoyos MD PhD as Consulting Physician (Hematology and Oncology)  Jerome Diallo MD as Referring Physician (Family Medicine)  Jose Enrique Louie MD as Consulting Physician (Gastroenterology)  Nima Huddleston MD as Consulting Physician (Nephrology)    Chief Complaint: Follow-up syncope, orthostatic hypotension.    Interval History: He is doing better in general.  He was sitting up in the chair.  No chest pain or shortness of breath.    Vital Signs:  Temp:  [97.6 °F (36.4 °C)-98.3 °F (36.8 °C)] 98.3 °F (36.8 °C)  Heart Rate:  [59-72] 63  Resp:  [18-20] 18  BP: (122-157)/(67-75) 122/67    Intake/Output Summary (Last 24 hours) at 1/8/2024 1422  Last data filed at 1/7/2024 1823  Gross per 24 hour   Intake 240 ml   Output --   Net 240 ml       Physical Exam:   General Appearance:    No acute distress, alert and oriented x4, chronically ill-appearing.   Lungs:     Clear to auscultation bilaterally     Heart:    Regular rhythm and normal rate.  No murmurs, gallops, or    rubs.   Abdomen:     Soft, nontender, nondistended.    Extremities:   No clubbing, cyanosis, or edema.     Results Review:    Results from last 7 days   Lab Units 01/08/24  0317   SODIUM mmol/L 134*   POTASSIUM mmol/L 4.1   CHLORIDE mmol/L 102   CO2 mmol/L 22.2   BUN mg/dL 33*   CREATININE mg/dL 2.83*   GLUCOSE mg/dL 113*   CALCIUM mg/dL 8.4*     Results from last 7 days   Lab Units 01/04/24  0210 01/03/24  2320   HSTROP T ng/L 175* 179*     Results from last 7 days   Lab Units 01/08/24  0317   WBC 10*3/mm3 5.04   HEMOGLOBIN g/dL 9.9*   HEMATOCRIT % 31.6*   PLATELETS 10*3/mm3 202     Results from last 7 days   Lab Units 01/04/24  0610 01/03/24  2320   INR  1.54* 1.54*   APTT seconds  --  31.2          Results from last 7 days   Lab Units 01/03/24  2320   MAGNESIUM mg/dL 2.4           I reviewed the patient's new clinical results.        Assessment:  1.  Syncope, secondary to orthostatic hypotension following dialysis  2.  Chronically elevated troponin secondary to renal disease  3.  Paroxysmal atrial fibrillation (more persistent recently)  4.  Chronic combined CHF, euvolemic currently  5.  Cardiomyopathy with varying ejection fractions in the past, EF 40 to 45% on 3/8/2023  6.  Recent coffee-ground emesis in December 2023, no obvious bleeding source by EGD on 12/4/2023  7.  Coronary artery disease status post CABG in 2001  8.  History of embolic CVA, on chronic anticoagulation  9.  End-stage renal disease, on hemodialysis  10.  COPD without acute exacerbation (on home O2)  11.  Anemia of chronic disease  12.  Right bundle branch block, chronic  13.  Longstanding diabetes  14.  Deconditioning and frailty    Plan:  -His syncopal episode was almost certainly secondary to orthostatic hypotension.  I am canceling the Zio patch.  I feel this will give very little information as this is almost certainly not from a rhythm issue.  He has also had atrial fibrillation in the past, and this would not be a new diagnosis.    -Continue reduced Coreg dose at 3.125 mg twice a day.  He is still on Flomax which can exacerbate orthostasis.    -Continue Eliquis and other cardiac medications.    -He is stable from a cardiac standpoint at this time.  No further testing is needed.  Cardiology will sign off for now.  Please call back if needed.    Mauricio Corona MD  01/08/24  14:22 EST

## 2024-01-08 NOTE — CASE MANAGEMENT/SOCIAL WORK
Discharge Planning Assessment  [facility]     Patient Name: Carlito Mo  MRN: [id number]  Today's Date: 1/8/2024    Admit Date: 1/3/2024    Plan: Home w/ [facility]OUGL[facility]   Discharge Needs Assessment       Row Name 01/08/24 1712       Living Environment    People in Home spouse;child(carlos), adult    Name(s) of People in Home Wife/Lissette Mo and disabled/autistic adult son (age 39)    Current Living Arrangements home    Potentially Unsafe Housing Conditions none    In the past 12 months has the electric, gas, oil, or water company threatened to shut off services in your home? No    Primary Care Provided by spouse/significant other    Provides Primary Care For no one, unable/limited ability to care for self    Family Caregiver if Needed child(carlos), adult    Family Caregiver Names Lissette Mo/spouse    Quality of Family Relationships helpful;involved;supportive    Able to Return to Prior Arrangements yes       Resource/Environmental Concerns    Resource/Environmental Concerns home accessibility    Home Accessibility Concerns stairs to enter home;stairs to access bedroom or bathroom  3 entrance stair steps and eight stair steps to bedroom    Transportation Concerns none       Food Insecurity    Within the past 12 months, you worried that your food would run out before you got the money to buy more. Never true    Within the past 12 months, the food you bought just didn't last and you didn't have money to get more. Never true       Transition Planning    Patient/Family Anticipates Transition to home with help/services    Patient/Family Anticipated Services at Transition home health care    Transportation Anticipated family or friend will provide       Discharge Needs Assessment    Readmission Within the Last 30 Days no previous admission in last 30 days    Current Outpatient/Agency/Support Group [facility] hemod[facility] [place] [facility]    Equipment Currently Used at Home glucometer;oxygen;cpap;cane, quad tip;walker,  rolling;bp cuff    Concerns to be Addressed discharge planning    Anticipated Changes Related to Illness none    Equipment Needed After Discharge none    Discharge Facility/Level of Care Needs home with home health    Patient's Choice of Community Agency(s) Wife chose [facility]                    Discharge Plan       Row Name 01/08/24 9023       Plan    Plan Home w/ [facility]    Plan Comments CCP spoke with Pt at bedside.  CCP role explained and discharge planning discussed.  Face sheet verified.  Pt stated he is IADL's, retired and drives.  Pt lives with spouse/Lissette Mo and disabled adult son (38yo).  Pt lives in tri-level home with three entrance stair steps and eight stairs to his bedroom.  Pt reports PCP is, Florencia Caballero.  Pt confirmed pharmacy is, [facility] Jerrell Madrigal.  [facility]  Pt is current with hemodialysis at [facility] on Tues, Thurs & Saturdays.  Pt recently went to [facility] for sub-acute rehab in December 2023.  Pt has the following DME- CPAP & oxygen ([employer]), glucometer, BP cuff monitor, four-post cane and rolling walker.  Pt plans to return home at discharge.  Wife request [facility]  follow.  Referral sent to [facility].  CCP has reached out to [facility] to see if Pt has any sub-acute rehab days left.  Plan is home with [facility] .  CCP will continue to follow…….Ame BERNAL /.                  Continued Care and Services - Admitted Since 1/3/2024       Destination       Service Provider Request Status Selected Services Address Phone Fax Patient Preferred    [facility] Accepted N/A [address] [facility] [address] [phone] --              Home Medical Care Coordination complete.      Service Provider Request Status Selected Services Address Phone Fax Patient Preferred     [facility]  Selected Home Health Services [address] [facility] [place] [address] [address]  [phone] --                  Selected Continued Care - Prior Encounters Includes continued care and service providers with selected services from prior encounters from 10/5/2023 to 1/8/2024      Discharged on 12/11/2023 Admission date: 11/28/2023 - Discharge disposition: Skilled Nursing Facility (DC - External)      Destination       Service Provider Selected Services Address Phone Fax Patient Preferred    [facility] [address] [address] [phone] --                          Expected Discharge Date and Time       Expected Discharge Date Expected Discharge Time    Jan 9, 2024            Demographic Summary       Row Name 01/08/24 1711       General Information    Admission Type inpatient    Arrived From emergency department    Required Notices Provided Important Message from Medicare    Referral Source admission list    Reason for Consult discharge planning    Preferred Language English       Contact Information    Permission Granted to Share Info With family/designee                   Functional Status       Row Name 01/08/24 1711       Functional Status    Usual Activity Tolerance fair    Current Activity Tolerance fair       Assessment of Health Literacy    How often do you have someone help you read hospital materials? Often    Health Literacy Moderate       Functional Status, IADL    Medications independent    Meal Preparation assistive person    Housekeeping completely dependent    Laundry completely dependent    Shopping completely dependent       Mental Status    General Appearance WDL WDL       Mental Status Summary    Recent Changes in Mental Status/Cognitive Functioning no changes       Employment/    Employment Status retired                   Psychosocial    No documentation.                  Abuse/Neglect    No documentation.                  Legal    No documentation.                  Substance  Abuse    No documentation.                  Patient Forms    No documentation.                     Ame Cardoza RN

## 2024-01-08 NOTE — PROGRESS NOTES
Name: Carlito Mo ADMIT: 1/3/2024   : 1955  PCP: Florencia Caballero MD    MRN: [id number] LOS: 3 days   AGE/SEX: 68 y.o. male  ROOM:      Subjective   Subjective   Up to bathroom with nurse.  Walked back to the chair while I was present.  He denies any chest pain, dyspnea, cough, fever or chills.  Does report some lightheadedness but no near syncope.  Denies any nausea, vomiting or abdominal pain.  Is having several smears of stool.     Objective   Objective   Vital Signs  Temp:  [97.6 °F (36.4 °C)-98.3 °F (36.8 °C)] 98.3 °F (36.8 °C)  Heart Rate:  [59-72] 63  Resp:  [18-20] 18  BP: (122-157)/(67-75) 122/67  SpO2:  [98 %-99 %] 99 %  on   ;   Device (Oxygen Therapy): room air  Body mass index is 19.58 kg/m².    Physical Exam  Vitals and nursing note reviewed.   Constitutional:       Appearance: He is ill-appearing. He is not toxic-appearing.   HENT:      Head: Normocephalic and atraumatic.   Cardiovascular:      Rate and Rhythm: Normal rate and regular rhythm.      Pulses: Normal pulses.   Pulmonary:      Effort: Pulmonary effort is normal. No respiratory distress.      Breath sounds: Normal breath sounds.   Abdominal:      General: Bowel sounds are normal. There is no distension.      Palpations: Abdomen is soft.      Tenderness: There is no abdominal tenderness.   Musculoskeletal:         General: Swelling (trace BLE) present. Normal range of motion.      Cervical back: Normal range of motion and neck supple.   Skin:     General: Skin is warm and dry.      Findings: No bruising.   Neurological:      General: No focal deficit present.      Mental Status: He is alert and oriented to person, place, and time.      Sensory: No sensory deficit.      Motor: Weakness present.      Coordination: Coordination normal.   Psychiatric:         Mood and Affect: Mood normal.         Behavior: Behavior normal.       Results Review:       I reviewed the patient's new clinical results.  Results from last 7 days    Lab Units 01/08/24 0317 01/07/24 0552 01/06/24 0312 01/05/24  0303   WBC 10*3/mm3 5.04 4.60 5.04 5.30   HEMOGLOBIN g/dL 9.9* 10.4* 9.6* 9.2*   PLATELETS 10*3/mm3 202 264 239 245     Results from last 7 days   Lab Units 01/08/24 0317 01/07/24 0552 01/06/24 0312 01/05/24  0303   SODIUM mmol/L 134* 139 134* 137   POTASSIUM mmol/L 4.1 4.3 4.2 4.2   CHLORIDE mmol/L 102 103 101 100   CO2 mmol/L 22.2 22.3 22.3 25.8   BUN mg/dL 33* 26* 56* 53*   CREATININE mg/dL 2.83* 2.31* 4.25* 4.94*   GLUCOSE mg/dL 113* 74 131* 156*   Estimated Creatinine Clearance: 23.1 mL/min (A) (by C-G formula based on SCr of 2.83 mg/dL (H)).  Results from last 7 days   Lab Units 01/08/24 0317 01/07/24 0552 01/06/24 0312 01/05/24  0303 01/03/24  2320   ALBUMIN g/dL 3.3* 3.7 3.4* 3.5 4.3   BILIRUBIN mg/dL  --   --   --   --  0.4   ALK PHOS U/L  --   --   --   --  98   AST (SGOT) U/L  --   --   --   --  52*   ALT (SGPT) U/L  --   --   --   --  33     Results from last 7 days   Lab Units 01/08/24 0317 01/07/24 0552 01/06/24 0312 01/05/24 0303 01/04/24  0610 01/03/24  2320   CALCIUM mg/dL 8.4* 9.0 8.5* 8.5*   < > 9.8   ALBUMIN g/dL 3.3* 3.7 3.4* 3.5  --  4.3   MAGNESIUM mg/dL  --   --   --   --   --  2.4   PHOSPHORUS mg/dL 3.1 3.0 4.7* 4.6*  --   --     < > = values in this interval not displayed.       Glucose   Date/Time Value Ref Range Status   01/08/2024 1137 224 (H) 70 - 130 mg/dL Final   01/08/2024 0650 102 70 - 130 mg/dL Final   01/07/2024 1930 185 (H) 70 - 130 mg/dL Final   01/07/2024 1624 99 70 - 130 mg/dL Final   01/07/2024 1108 165 (H) 70 - 130 mg/dL Final   01/07/2024 0637 71 70 - 130 mg/dL Final   01/06/2024 2137 178 (H) 70 - 130 mg/dL Final       apixaban, 2.5 mg, Oral, Q12H  budesonide-formoterol, 2 puff, Inhalation, BID - RT  buPROPion XL, 150 mg, Oral, Daily  carvedilol, 3.125 mg, Oral, BID With Meals  insulin glargine, 2 Units, Subcutaneous, Nightly  insulin lispro, 2-7 Units, Subcutaneous, 4x Daily AC & at  Bedtime  levothyroxine, 75 mcg, Oral, Daily  pantoprazole, 40 mg, Oral, BID AC  rosuvastatin, 10 mg, Oral, Daily  senna-docusate sodium, 2 tablet, Oral, BID  sodium chloride, 10 mL, Intravenous, Q12H  tamsulosin, 0.4 mg, Oral, Daily       Diet: Cardiac Diets; Healthy Heart (2-3 Na+); Texture: Regular Texture (IDDSI 7); Fluid Consistency: Thin (IDDSI 0)       Assessment/Plan     Active Hospital Problems    Diagnosis  POA    **Syncope [R55]  Yes    Abnormal CT of the chest [R93.89]  Yes    ESRD (end stage renal disease) [N18.6]  Yes    History of stroke [Z86.73]  Not Applicable    Anemia, chronic disease [D63.8]  Yes    Essential hypertension [I10]  Yes    Coronary artery disease involving native coronary artery of native heart without angina pectoris [I25.10]  Yes    Chronic combined systolic and diastolic CHF (congestive heart failure) [I50.42]  Yes    Acquired hypothyroidism [E03.9]  Yes    Type 2 diabetes mellitus, with long-term current use of insulin [E11.9, Z79.4]  Not Applicable      Resolved Hospital Problems   No resolved problems to display.     Mr. Mo is a 68 y.o. male who presented to the hospital with complaints of a syncopal event when ambulating to the bathroom at home.  He has a history of ESRD on hemodialysis, atrial fibrillation, CHF, CAD, anemia, diabetes, hypertension and prior stroke.  Cardiology as well as nephrology were consulted.    Syncope  -Cardiology evaluated.  Felt almost certainly secondary to hypotension.  -Zio patch plans have been canceled.  Felt to be low yield.  -Home Coreg was reduced to 3.125 mg twice daily.  Remains on Flomax which can exacerbate orthostasis.  -Cardiology has signed off.    ESRD  -Nephrology managing.  Hemodialysis TTS.  Dry weight adjusted.  Was given brief IV fluids.  -Plans to possibly stop tamsulosin if blood pressure continues to be an issue.  -Monitor labs.    Abnormal CT of chest  -Tree-in-bud nodularities all lobes likely chronic atypical infection.  Denies respiratory symptoms, fever. No leukocytosis. Monitor for now     CAD  CHF  PAF  -As above.  Cardiology signed off.  -Fluid removal with hemodialysis  -Continue home Eliquis and lower dose of Coreg.    DM2  -Glucose stable on very low-dose long-acting insulin and SSI.    Anemia  -Stable.  Monitor trends.    Constipation  -Given soap suds enema 1/7 with moderate results.  -Having some smears today- continue bowel regimen. Tolerating diet and denies pain.    Discussed with patient and nursing staff.    May be ready for discharge tomorrow.  Likely home with home health, but latest PT note also mentions SNF.  Await CCP.    VTE Prophylaxis - Eliquis (home med)  Code Status - Full code  Disposition - Anticipate discharge as above.      JI Curtis  [facility]  01/08/24  15:29 EST

## 2024-01-08 NOTE — PLAN OF CARE
Goal Outcome Evaluation:  Plan of Care Reviewed With: patient           Outcome Evaluation: Pt able to walk farther today but heavily reliant on the rolling walker with a forward flexed posture and shuffling steps. Pt needed min/CGA, pt trialed stairs today, went up and down 3 stairs holding to the railing, needed moderate assist. Pt would need constant hands on assist at home, or may need to consider SNF at discharge      Anticipated Discharge Disposition (PT): home with assist, home with 24/7 care, skilled nursing facility (depends on progress)

## 2024-01-08 NOTE — PROGRESS NOTES
[facility] Progress Note      Patient Name: Carlito Mo  : 1955  MRN: [id number]  Primary Care Physician:  Florencia Caballero MD  Date of admission: 1/3/2024    Subjective     Interval History:   Patient seen today for follow-up on end-stage renal disease.  Has no new complaints today.  Denies any nausea or vomiting.  No fever no chills    Review of Systems:   As noted above    Objective     Vitals:   Temp:  [97.6 °F (36.4 °C)-98 °F (36.7 °C)] 98 °F (36.7 °C)  Heart Rate:  [59-72] 72  Resp:  [18-20] 18  BP: (141-157)/(68-75) 154/68    Intake/Output Summary (Last 24 hours) at 2024 0940  Last data filed at 2024 1823  Gross per 24 hour   Intake 240 ml   Output 175 ml   Net 65 ml       Physical Exam:    General Appearance: Frail and fatigued.  Skin: warm and dry  HEENT: oral mucosa normal, nonicteric sclera  Neck: supple, no JVD  Lungs: CTA  Heart: RRR, normal S1 and S2  Abdomen: Soft nontender, nondistended.  : no palpable bladder  Extremities: no edema, cyanosis or clubbing  Neuro: normal speech and mental status     Scheduled Meds:     apixaban, 2.5 mg, Oral, Q12H  budesonide-formoterol, 2 puff, Inhalation, BID - RT  buPROPion XL, 150 mg, Oral, Daily  carvedilol, 3.125 mg, Oral, BID With Meals  insulin glargine, 2 Units, Subcutaneous, Nightly  insulin lispro, 2-7 Units, Subcutaneous, 4x Daily AC & at Bedtime  levothyroxine, 75 mcg, Oral, Daily  pantoprazole, 40 mg, Oral, BID AC  rosuvastatin, 10 mg, Oral, Daily  senna-docusate sodium, 2 tablet, Oral, BID  sodium chloride, 10 mL, Intravenous, Q12H  tamsulosin, 0.4 mg, Oral, Daily      IV Meds:        Results Reviewed:   I have personally reviewed the results from the time of this admission to 2024 09:40 EST     Results from last 7 days   Lab Units 24  0317 24  0552 24  0312 24  0610 24  2320   SODIUM mmol/L 134* 139 134*   < > 142   POTASSIUM mmol/L 4.1 4.3 4.2   < > 4.9   CHLORIDE  mmol/L 102 103 101   < > 98   CO2 mmol/L 22.2 22.3 22.3   < > 21.6*   BUN mg/dL 33* 26* 56*   < > 40*   CREATININE mg/dL 2.83* 2.31* 4.25*   < > 4.64*   CALCIUM mg/dL 8.4* 9.0 8.5*   < > 9.8   BILIRUBIN mg/dL  --   --   --   --  0.4   ALK PHOS U/L  --   --   --   --  98   ALT (SGPT) U/L  --   --   --   --  33   AST (SGOT) U/L  --   --   --   --  52*   GLUCOSE mg/dL 113* 74 131*   < > 173*    < > = values in this interval not displayed.       Estimated Creatinine Clearance: 23.1 mL/min (A) (by C-G formula based on SCr of 2.83 mg/dL (H)).    Results from last 7 days   Lab Units 01/08/24 0317 01/07/24 0552 01/06/24 0312 01/05/24 0303 01/03/24  2320   MAGNESIUM mg/dL  --   --   --   --  2.4   PHOSPHORUS mg/dL 3.1 3.0 4.7*   < >  --     < > = values in this interval not displayed.             Results from last 7 days   Lab Units 01/08/24 0317 01/07/24 0552 01/06/24 0312 01/05/24  0303 01/04/24  0610   WBC 10*3/mm3 5.04 4.60 5.04 5.30 4.18   HEMOGLOBIN g/dL 9.9* 10.4* 9.6* 9.2* 10.9*   PLATELETS 10*3/mm3 202 264 239 245 287       Results from last 7 days   Lab Units 01/04/24  0610 01/03/24  2320   INR  1.54* 1.54*       Assessment / Plan     ASSESSMENT:  End-stage renal disease on maintenance hemodialysis on Tuesday Thursday and Saturday.    Hypertension with CKD.  Electrolytes okay  Type 2 diabetes mellitus with CKD  Recent syncope.  Likely secondary to hypovolemia after HD.  Cannot rule out arrhythmia.  Cardiology evaluating   systolic and diastolic heart failure with EF of 41 to 45%.  Seems to be compensated  Paroxysmal atrial fibrillation  Anemia of chronic kidney disease           PLAN:  Hemodialysis TTS.  Continue current dose of carvedilol 3.125 mg twice daily.    May need to stop tamsulosin if blood pressure continues to be an issue  Labs in a.m.      Thank you for involving us in the care of Carlito Mo.  Please feel free to call with any questions.    Ayad Bangura MD  01/08/24  09:40  EST    [facility]  [phone]    Parts of this note may be an electronic transcription/translation of spoken language to printed text using the Dragon dictation system.

## 2024-01-08 NOTE — THERAPY TREATMENT NOTE
Patient Name: Carlito Mo  : 1955    MRN: [id number]                              Today's Date: 2024       Admit Date: 1/3/2024    Visit Dx:     ICD-10-CM ICD-9-CM   1. Syncope, unspecified syncope type  R55 780.2   2. ESRD (end stage renal disease)  N18.6 585.6     Patient Active Problem List   Diagnosis    Major depressive disorder with single episode, in partial remission    Other hyperlipidemia    Type 2 diabetes mellitus, with long-term current use of insulin    Vitamin D deficiency    Erectile dysfunction    Chronic fatigue    Type 2 diabetes mellitus with ophthalmic complication    Skin lesion of chest wall    Slow transit constipation    Benign prostatic hyperplasia with nocturia    History of basal cell carcinoma    High blood pressure associated with diabetes    Acquired hypothyroidism    Hypertensive urgency    Recurrent strokes    Noncompliance w/medication treatment due to intermit use of medication    Urinary retention    Pneumonia    Chronic combined systolic and diastolic CHF (congestive heart failure)    Acute respiratory failure with hypoxia    Suspected sleep apnea    Pleural effusion    YAW (obstructive sleep apnea)    Coronary artery disease involving native coronary artery of native heart without angina pectoris    Chronically elevated troponin    Colon cancer screening    Enlarged lymph nodes    Functional gait abnormality    History of myocardial infarction    Hospital discharge follow-up    Insomnia    Iron deficiency anemia    Major depression, recurrent    Anemia, chronic renal failure, stage 3 (moderate)    Essential hypertension    Adverse effect of iron and its compounds, initial encounter    Acute on chronic renal insufficiency    Debility    Falls frequently    Acute pain of right shoulder    Acute on chronic anemia    Heme positive stool    Neurogenic orthostatic hypotension    Anemia, chronic disease    History of colon polyps    Helicobacter pylori gastritis     Esophagitis    Sleep related hypoxia    Embolic stroke    Patent foramen ovale    Pleural effusion, bilateral    Cardiomyopathy    Lower abdominal pain    Diarrhea    CKD (chronic kidney disease) stage 4, GFR 15-29 ml/min    Hypertension not at goal    Memory loss due to medical condition    Generalized weakness    ERRONEOUS ENCOUNTER--DISREGARD    Weakness    Paroxysmal atrial fibrillation    Chronic anticoagulation    Multiple falls    Dehydration    SYLVAIN (acute kidney injury)    Acute ischemic stroke    Acute combined systolic and diastolic congestive heart failure    History of stroke    Iron deficiency anemia    Acute HFrEF (heart failure with reduced ejection fraction)    Atrial fibrillation with rapid ventricular response    ESRD (end stage renal disease)    Nausea & vomiting    Hemoptysis    Coffee ground emesis    Severe malnutrition    Chronic HFrEF (heart failure with reduced ejection fraction)    Syncope    Abnormal CT of the chest     Past Medical History:   Diagnosis Date    Acquired hypothyroidism     Acute congestive heart failure     Acute respiratory failure with hypoxia     Acute sinusitis     SYLVAIN (acute kidney injury)     on CKD    Anemia     Arthritis     CAD (coronary artery disease)     Cancer     skin    Chronic combined systolic and diastolic congestive heart failure     Chronic ischemic heart disease     Chronic kidney disease (CKD)     CKD (chronic kidney disease)     COPD (chronic obstructive pulmonary disease)     Depression     Diabetes mellitus type 2, uncontrolled, without complications     Diabetic neuropathy     Disease of thyroid gland     Elevated cholesterol     Fatigue     Frequent PVCs     Generalized weakness     GERD (gastroesophageal reflux disease)     Heart attack     History of coronary artery disease     with remote history of bypass surgery in 2001    Hyperlipidemia     Hypertension     Hypertensive encephalopathy     Mental status change     Acute    Nausea & vomiting  12/01/2023    NO DEVICE/RECALLED     Pleural effusion     Pneumonia     Poor historian     RBBB (right bundle branch block)     Stroke     POOR VISION    TIA (transient ischemic attack)     Type 2 diabetes mellitus     Urinary retention     Vitamin D deficiency      Past Surgical History:   Procedure Laterality Date    APPENDECTOMY N/A 02/14/2016    Dr. Alexey Dodson    ARTERIOVENOUS FISTULA/SHUNT SURGERY Right 06/03/2022    Procedure: RIGHT FOREARM ARTERIOVENOUS FISTULA PLACEMENT RADIOCEPHALIC WITH CEPHALIC VEIN TRANSPOSITION;  Surgeon: Eliseo Willis MD;  Location: [facility] MAIN OR;  Service: Vascular;  Laterality: Right;    COLONOSCOPY      over 20 years ago.  no polyps     COLONOSCOPY N/A 09/18/2018    Procedure: COLONOSCOPY;  Surgeon: Jose Enrique Louie MD;  Location: [facility] ENDOSCOPY;  Service: Gastroenterology    COLONOSCOPY N/A 09/19/2018    IH, EH, polyps (TAs w/low grade dysplasia)    COLONOSCOPY N/A 06/01/2020    Procedure: COLONOSCOPY;  Surgeon: Jose Enrique Louie MD;  Location: [facility] ENDOSCOPY;  Service: Gastroenterology;  Laterality: N/A;  Pre op-Anemia, Melena, History of Polyps  Post op-To Cecum/TI, Polyp, Poor Prep    CORONARY ARTERY BYPASS GRAFT  11/2001    ENDOSCOPY N/A 05/29/2020    Procedure: ESOPHAGOGASTRODUODENOSCOPY with biopsies;  Surgeon: Amanda Lovelace MD;  Location: [facility] ENDOSCOPY;  Service: Gastroenterology;  Laterality: N/A;  pre-anemia, dark stools  post-esophagitis, hiatal hernia    ENDOSCOPY N/A 09/15/2020    Procedure: ESOPHAGOGASTRODUODENOSCOPY WITH BIOPSIES;  Surgeon: Jose Enrique Louie MD;  Location: [facility] ENDOSCOPY;  Service: Gastroenterology;  Laterality: N/A;  pre: history of melena and esophagitis  post: mild esophagitis and gastritis, small hiatal hernia    ENDOSCOPY N/A 12/4/2023    Procedure: ESOPHAGOGASTRODUODENOSCOPY with bx;  Surgeon: Quoc Elmore MD;  Location: [facility] ENDOSCOPY;  Service: Gastroenterology;  Laterality: N/A;  pre: Coffee-ground  emesis  post: hiatal hernia, esophagitis    HERNIA REPAIR Left 12/05/2019    THORACENTESIS      TONSILLECTOMY      VASECTOMY        General Information       Row Name 01/08/24 1259          Physical Therapy Time and Intention    Document Type therapy note (daily note)  -PC     Mode of Treatment physical therapy  -PC               User Key  (r) = Recorded By, (t) = Taken By, (c) = Cosigned By      Initials Name Provider Type    PC Denise Espinal, PT Physical Therapist                   Mobility       Row Name 01/08/24 1259          Bed Mobility    Comment, (Bed Mobility) in chair  -PC       Row Name 01/08/24 1259          Sit-Stand Transfer    Sit-Stand [place] (Transfers) minimum assist (75% patient effort)  -PC     Assistive Device (Sit-Stand Transfers) walker, front-wheeled  -PC       Row Name 01/08/24 1259          Gait/Stairs (Locomotion)    [place] Level (Gait) minimum assist (75% patient effort);contact guard  -PC     Assistive Device (Gait) walker, front-wheeled  -PC     Distance in Feet (Gait) 75 ft  -PC     Deviations/Abnormal Patterns (Gait) festinating/shuffling;gait speed decreased;stride length decreased  -PC     Bilateral Gait Deviations forward flexed posture;heel strike decreased  -PC     [place] Level (Stairs) moderate assist (50% patient effort)  -PC     Handrail Location (Stairs) right side (ascending);left side (descending)  -PC     Number of Steps (Stairs) 3  -PC     Ascending Technique (Stairs) step-to-step  -PC     Descending Technique (Stairs) step-to-step  -PC               User Key  (r) = Recorded By, (t) = Taken By, (c) = Cosigned By      Initials Name Provider Type    PC Denise Espinal PT Physical Therapist                   Obj/Interventions    No documentation.                  Goals/Plan    No documentation.                  Clinical Impression       Row Name 01/08/24 1301          Pain    Pretreatment Pain Rating 0/10 - no pain  -PC       Row Name 01/08/24 1301           Plan of Care Review    Plan of Care Reviewed With patient  -PC     Outcome Evaluation Pt able to walk farther today but heavily reliant on the rolling walker with a forward flexed posture and shuffling steps. Pt needed min/CGA, pt trialed stairs today, went up and down 3 stairs holding to the railing, needed moderate assist. Pt would need constant hands on assist at home, or may need to consider SNF at discharge  -PC       Row Name 01/08/24 1301          Positioning and Restraints    Pre-Treatment Position sitting in chair/recliner  -PC     Post Treatment Position chair  -PC     In Chair sitting;call light within reach;encouraged to call for assist;exit alarm on  -PC               User Key  (r) = Recorded By, (t) = Taken By, (c) = Cosigned By      Initials Name Provider Type    PC Denise Espinal, PT Physical Therapist                   Outcome Measures       Row Name 01/08/24 1307 01/08/24 0900       How much help from another person do you currently need...    Turning from your back to your side while in flat bed without using bedrails? 4  -PC 4  -BP    Moving from lying on back to sitting on the side of a flat bed without bedrails? 3  -PC 3  -BP    Moving to and from a bed to a chair (including a wheelchair)? 3  -PC 3  -BP    Standing up from a chair using your arms (e.g., wheelchair, bedside chair)? 3  -PC 3  -BP    Climbing 3-5 steps with a railing? 2  -PC 3  -BP    To walk in hospital room? 3  -PC 3  -BP    AM-PAC 6 Clicks Score (PT) 18  -PC 19  -BP    Highest Level of Mobility Goal 6 --> Walk 10 steps or more  -PC 6 --> Walk 10 steps or more  -BP              User Key  (r) = Recorded By, (t) = Taken By, (c) = Cosigned By      Initials Name Provider Type    Denise Arrington, PT Physical Therapist    Scooby Treadwell, RN Registered Nurse                                 Physical Therapy Education       Title: PT OT SLP Therapies (Done)       Topic: Physical Therapy (Done)       Point: Mobility training  (Done)       Learning Progress Summary             Patient Acceptance, E,TB, VU by  at 1/7/2024 0827    Acceptance, E, NR by AR at 1/5/2024 1537                         Point: Home exercise program (Done)       Learning Progress Summary             Patient Acceptance, E,TB, VU by  at 1/7/2024 0827    Acceptance, E, NR by AR at 1/5/2024 1537                         Point: Body mechanics (Done)       Learning Progress Summary             Patient Acceptance, E,TB, VU by  at 1/7/2024 0827    Acceptance, E, NR by AR at 1/5/2024 1537                         Point: Precautions (Done)       Learning Progress Summary             Patient Acceptance, E,TB, VU by  at 1/7/2024 0827    Acceptance, E, NR by AR at 1/5/2024 1537                                         User Key       Initials Effective Dates Name Provider Type Discipline    AR 06/16/21 -  Kellen Archbiald, PT Physical Therapist PT     06/16/21 -  Marianne Jonas, RN Registered Nurse Nurse                  PT Recommendation and Plan     Plan of Care Reviewed With: patient  Outcome Evaluation: Pt able to walk farther today but heavily reliant on the rolling walker with a forward flexed posture and shuffling steps. Pt needed min/CGA, pt trialed stairs today, went up and down 3 stairs holding to the railing, needed moderate assist. Pt would need constant hands on assist at home, or may need to consider SNF at discharge     Time Calculation:         PT Charges       Row Name 01/08/24 1308             Time Calculation    Start Time 1143  -PC      Stop Time 1155  -PC      Time Calculation (min) 12 min  -PC      PT Received On 01/08/24  -PC      PT - Next Appointment 01/09/24  -PC                User Key  (r) = Recorded By, (t) = Taken By, (c) = Cosigned By      Initials Name Provider Type    PC Denise Espinal PT Physical Therapist                  Therapy Charges for Today       Code Description Service Date Service Provider Modifiers Qty    [id number]  PT  THER PROC EA 15 MIN 1/8/2024 Denise Espinal, PT GP 1            PT G-Codes  Outcome Measure Options: AM-PAC 6 Clicks Basic Mobility (PT)  AM-PAC 6 Clicks Score (PT): 18  PT Discharge Summary  Anticipated Discharge Disposition (PT): home with assist, home with 24/7 care, skilled nursing facility (depends on progress)    Denise Espinal, PT  1/8/2024

## 2024-01-08 NOTE — DISCHARGE PLACEMENT REQUEST
"Carlito Mo (68 y.o. Male)       Date of Birth   1955    Social Security Number       Address   [address] [place] [address]    Home Phone   [phone]    MRN   [id number]       [facility]    Marital Status                               Admission Date   1/3/24    Admission Type   Emergency    Admitting Provider   Alexey Haddad MD    Attending Provider   Delmar Tse MD    Department, Room/Bed   [facility] CARDIOVASC UNIT, 2211/1       Discharge Date       Discharge Disposition       Discharge Destination                                 Attending Provider: Delmar Tse MD    Allergies: Prozac [Fluoxetine Hcl]    Isolation: None   Infection: None   Code Status: CPR    Ht: 182.9 cm (72\")   Wt: 65.5 kg (144 lb 6.4 oz)    Admission Cmt: None   Principal Problem: Syncope [R55]                   Active Insurance as of 1/3/2024       Primary Coverage       Payor Plan Insurance Group Employer/Plan Group    MEDICARE MEDICARE A & B        Payor Plan Address Payor Plan Phone Number Payor Plan Fax Number Effective Dates    [address] [phone]  5/1/2020 - None Entered    [place] [address]         Subscriber Name Subscriber Birth Date Member ID       CARLITO MO 1955 [id number]                     Emergency Contacts        (Rel.) Home Phone Work Phone Mobile Phone    Lissette Mo (Spouse) [phone] -- [phone]                "

## 2024-01-09 ENCOUNTER — HOME HEALTH ADMISSION (OUTPATIENT)
Dept: HOME HEALTH SERVICES | Facility: HOME HEALTHCARE | Age: 69
End: 2024-01-09
Payer: MEDICARE

## 2024-01-09 LAB
ALBUMIN SERPL-MCNC: 3.4 G/DL (ref 3.5–5.2)
ANION GAP SERPL CALCULATED.3IONS-SCNC: 8.9 MMOL/L (ref 5–15)
BUN SERPL-MCNC: 40 MG/DL (ref 8–23)
BUN/CREAT SERPL: 12 (ref 7–25)
CALCIUM SPEC-SCNC: 8.5 MG/DL (ref 8.6–10.5)
CHLORIDE SERPL-SCNC: 107 MMOL/L (ref 98–107)
CO2 SERPL-SCNC: 22.1 MMOL/L (ref 22–29)
CREAT SERPL-MCNC: 3.32 MG/DL (ref 0.76–1.27)
DEPRECATED RDW RBC AUTO: 47.9 FL (ref 37–54)
EGFRCR SERPLBLD CKD-EPI 2021: 19.4 ML/MIN/1.73
ERYTHROCYTE [DISTWIDTH] IN BLOOD BY AUTOMATED COUNT: 15.7 % (ref 12.3–15.4)
GLUCOSE BLDC GLUCOMTR-MCNC: 156 MG/DL (ref 70–130)
GLUCOSE BLDC GLUCOMTR-MCNC: 167 MG/DL (ref 70–130)
GLUCOSE BLDC GLUCOMTR-MCNC: 59 MG/DL (ref 70–130)
GLUCOSE BLDC GLUCOMTR-MCNC: 98 MG/DL (ref 70–130)
GLUCOSE SERPL-MCNC: 59 MG/DL (ref 65–99)
HBV SURFACE AG SERPL QL IA: NORMAL
HCT VFR BLD AUTO: 29.8 % (ref 37.5–51)
HGB BLD-MCNC: 9.3 G/DL (ref 13–17.7)
MCH RBC QN AUTO: 26.6 PG (ref 26.6–33)
MCHC RBC AUTO-ENTMCNC: 31.2 G/DL (ref 31.5–35.7)
MCV RBC AUTO: 85.4 FL (ref 79–97)
PHOSPHATE SERPL-MCNC: 3.3 MG/DL (ref 2.5–4.5)
PLATELET # BLD AUTO: 195 10*3/MM3 (ref 140–450)
PMV BLD AUTO: 9.7 FL (ref 6–12)
POTASSIUM SERPL-SCNC: 4.4 MMOL/L (ref 3.5–5.2)
RBC # BLD AUTO: 3.49 10*6/MM3 (ref 4.14–5.8)
SODIUM SERPL-SCNC: 138 MMOL/L (ref 136–145)
WBC NRBC COR # BLD AUTO: 4.37 10*3/MM3 (ref 3.4–10.8)

## 2024-01-09 PROCEDURE — 85027 COMPLETE CBC AUTOMATED: CPT | Performed by: NURSE PRACTITIONER

## 2024-01-09 PROCEDURE — 94760 N-INVAS EAR/PLS OXIMETRY 1: CPT

## 2024-01-09 PROCEDURE — 94664 DEMO&/EVAL PT USE INHALER: CPT

## 2024-01-09 PROCEDURE — 25010000002 GLUCAGON (RDNA) PER 1 MG: Performed by: NURSE PRACTITIONER

## 2024-01-09 PROCEDURE — 80069 RENAL FUNCTION PANEL: CPT | Performed by: NURSE PRACTITIONER

## 2024-01-09 PROCEDURE — 63710000001 INSULIN LISPRO (HUMAN) PER 5 UNITS: Performed by: NURSE PRACTITIONER

## 2024-01-09 PROCEDURE — 82948 REAGENT STRIP/BLOOD GLUCOSE: CPT

## 2024-01-09 PROCEDURE — 94799 UNLISTED PULMONARY SVC/PX: CPT

## 2024-01-09 PROCEDURE — 87340 HEPATITIS B SURFACE AG IA: CPT | Performed by: HOSPITALIST

## 2024-01-09 PROCEDURE — 94761 N-INVAS EAR/PLS OXIMETRY MLT: CPT

## 2024-01-09 RX ORDER — BISACODYL 10 MG
10 SUPPOSITORY, RECTAL RECTAL DAILY PRN
Status: DISCONTINUED | OUTPATIENT
Start: 2024-01-09 | End: 2024-01-11 | Stop reason: HOSPADM

## 2024-01-09 RX ORDER — DOCUSATE SODIUM 100 MG/1
100 CAPSULE, LIQUID FILLED ORAL DAILY
Status: DISCONTINUED | OUTPATIENT
Start: 2024-01-09 | End: 2024-01-10

## 2024-01-09 RX ORDER — BISACODYL 5 MG/1
5 TABLET, DELAYED RELEASE ORAL DAILY PRN
Status: DISCONTINUED | OUTPATIENT
Start: 2024-01-09 | End: 2024-01-11 | Stop reason: HOSPADM

## 2024-01-09 RX ORDER — POLYETHYLENE GLYCOL 3350 17 G/17G
17 POWDER, FOR SOLUTION ORAL DAILY PRN
Status: DISCONTINUED | OUTPATIENT
Start: 2024-01-09 | End: 2024-01-11 | Stop reason: HOSPADM

## 2024-01-09 RX ORDER — INSULIN GLARGINE 100 [IU]/ML
2 INJECTION, SOLUTION SUBCUTANEOUS DAILY
Status: DISCONTINUED | OUTPATIENT
Start: 2024-01-10 | End: 2024-01-11 | Stop reason: HOSPADM

## 2024-01-09 RX ORDER — AMOXICILLIN 250 MG
2 CAPSULE ORAL 2 TIMES DAILY PRN
Status: DISCONTINUED | OUTPATIENT
Start: 2024-01-09 | End: 2024-01-11 | Stop reason: HOSPADM

## 2024-01-09 RX ADMIN — INSULIN LISPRO 2 UNITS: 100 INJECTION, SOLUTION INTRAVENOUS; SUBCUTANEOUS at 20:47

## 2024-01-09 RX ADMIN — LEVOTHYROXINE SODIUM 75 MCG: 75 TABLET ORAL at 07:21

## 2024-01-09 RX ADMIN — APIXABAN 2.5 MG: 2.5 TABLET, FILM COATED ORAL at 08:07

## 2024-01-09 RX ADMIN — CARVEDILOL 3.12 MG: 3.12 TABLET, FILM COATED ORAL at 17:11

## 2024-01-09 RX ADMIN — BUDESONIDE AND FORMOTEROL FUMARATE DIHYDRATE 2 PUFF: 160; 4.5 AEROSOL RESPIRATORY (INHALATION) at 07:26

## 2024-01-09 RX ADMIN — CARVEDILOL 3.12 MG: 3.12 TABLET, FILM COATED ORAL at 08:07

## 2024-01-09 RX ADMIN — PANTOPRAZOLE SODIUM 40 MG: 40 TABLET, DELAYED RELEASE ORAL at 16:50

## 2024-01-09 RX ADMIN — BUPROPION HYDROCHLORIDE 150 MG: 150 TABLET, EXTENDED RELEASE ORAL at 08:08

## 2024-01-09 RX ADMIN — Medication 10 ML: at 08:09

## 2024-01-09 RX ADMIN — Medication 1 MG: at 06:59

## 2024-01-09 RX ADMIN — ROSUVASTATIN CALCIUM 10 MG: 10 TABLET, FILM COATED ORAL at 08:08

## 2024-01-09 RX ADMIN — PANTOPRAZOLE SODIUM 40 MG: 40 TABLET, DELAYED RELEASE ORAL at 07:22

## 2024-01-09 RX ADMIN — TAMSULOSIN HYDROCHLORIDE 0.4 MG: 0.4 CAPSULE ORAL at 08:08

## 2024-01-09 RX ADMIN — APIXABAN 2.5 MG: 2.5 TABLET, FILM COATED ORAL at 20:47

## 2024-01-09 RX ADMIN — Medication 10 ML: at 21:08

## 2024-01-09 RX ADMIN — INSULIN LISPRO 2 UNITS: 100 INJECTION, SOLUTION INTRAVENOUS; SUBCUTANEOUS at 16:49

## 2024-01-09 NOTE — NURSING NOTE
HD WITHOUT INCIDENT OR C/O. TOLERATED WELL. REMOVED 1 L. NO MEDS ADMINISTERED. AVF NEEDLES REMOVED X 2. HEMOSTASIS ACHIEVED. STABLE POST COMPLETION OF HD.

## 2024-01-09 NOTE — SIGNIFICANT NOTE
01/09/24 1138   OTHER   Discipline physical therapist   Rehab Time/Intention   Session Not Performed patient unavailable for treatment  (off floor to dialysis, will follow up tomorrow)   Recommendation   PT - Next Appointment 01/10/24

## 2024-01-09 NOTE — PROGRESS NOTES
Name: Carlito Mo ADMIT: 1/3/2024   : 1955  PCP: Florencia Caballero MD    MRN: [id number] LOS: 4 days   AGE/SEX: 68 y.o. male  ROOM:      Subjective   Subjective   Patient appears comfortable, generally weak, and in no apparent distress.  Tolerating intake.  Unaware of low glucose readings.  Reportedly drinks boost every night.  Patient unable to contact wife at this time.  Discussed with RN.       Objective   Objective   Vital Signs  Temp:  [97.3 °F (36.3 °C)-98.2 °F (36.8 °C)] 97.3 °F (36.3 °C)  Heart Rate:  [59-73] 68  Resp:  [16-20] 16  BP: (110-161)/(56-90) 141/71  SpO2:  [98 %-100 %] 100 %  on   ;   Device (Oxygen Therapy): room air  Body mass index is 19.26 kg/m².    Physical Exam  Constitutional:       General: He is not in acute distress.     Appearance: He is not toxic-appearing.   Cardiovascular:      Rate and Rhythm: Normal rate.      Heart sounds: Normal heart sounds.   Pulmonary:      Effort: Pulmonary effort is normal.      Comments: Diminished on expiration anteriorly  Abdominal:      General: Bowel sounds are normal.      Palpations: Abdomen is soft.   Musculoskeletal:      Right lower leg: No edema.      Left lower leg: No edema.   Skin:     General: Skin is warm and dry.   Neurological:      Mental Status: He is alert.       Results Review     I reviewed the patient's new clinical results.  Results from last 7 days   Lab Units 24   WBC 10*3/mm3 4.37 5.04 4.60 5.04   HEMOGLOBIN g/dL 9.3* 9.9* 10.4* 9.6*   PLATELETS 10*3/mm3 195 202 264 239     Results from last 7 days   Lab Units 2452 24   SODIUM mmol/L 138 134* 139 134*   POTASSIUM mmol/L 4.4 4.1 4.3 4.2   CHLORIDE mmol/L 107 102 103 101   CO2 mmol/L 22.1 22.2 22.3 22.3   BUN mg/dL 40* 33* 26* 56*   CREATININE mg/dL 3.32* 2.83* 2.31* 4.25*   GLUCOSE mg/dL 59* 113* 74 131*   EGFR mL/min/1.73 19.4* 23.5* 30.0* 14.4*      Results from last 7 days   Lab Units 01/09/24  0305 01/08/24 0317 01/07/24  0552 01/06/24 0312 01/05/24  0303 01/03/24  2320   ALBUMIN g/dL 3.4* 3.3* 3.7 3.4*   < > 4.3   BILIRUBIN mg/dL  --   --   --   --   --  0.4   ALK PHOS U/L  --   --   --   --   --  98   AST (SGOT) U/L  --   --   --   --   --  52*   ALT (SGPT) U/L  --   --   --   --   --  33    < > = values in this interval not displayed.     Results from last 7 days   Lab Units 01/09/24 0305 01/08/24 0317 01/07/24 0552 01/06/24 0312 01/04/24  0610 01/03/24  2320   CALCIUM mg/dL 8.5* 8.4* 9.0 8.5*   < > 9.8   ALBUMIN g/dL 3.4* 3.3* 3.7 3.4*   < > 4.3   MAGNESIUM mg/dL  --   --   --   --   --  2.4   PHOSPHORUS mg/dL 3.3 3.1 3.0 4.7*   < >  --     < > = values in this interval not displayed.       Glucose   Date/Time Value Ref Range Status   01/09/2024 0717 98 70 - 130 mg/dL Final   01/09/2024 0656 59 (L) 70 - 130 mg/dL Final   01/08/2024 2017 166 (H) 70 - 130 mg/dL Final   01/08/2024 1611 101 70 - 130 mg/dL Final   01/08/2024 1137 224 (H) 70 - 130 mg/dL Final   01/08/2024 0650 102 70 - 130 mg/dL Final   01/07/2024 1930 185 (H) 70 - 130 mg/dL Final       No radiology results for the last day    I have personally reviewed all medications:  Scheduled Medications  apixaban, 2.5 mg, Oral, Q12H  budesonide-formoterol, 2 puff, Inhalation, BID - RT  buPROPion XL, 150 mg, Oral, Daily  carvedilol, 3.125 mg, Oral, BID With Meals  docusate sodium, 100 mg, Oral, Daily  [START ON 1/10/2024] insulin glargine, 2 Units, Subcutaneous, Daily  insulin lispro, 2-7 Units, Subcutaneous, 4x Daily AC & at Bedtime  levothyroxine, 75 mcg, Oral, Daily  pantoprazole, 40 mg, Oral, BID AC  rosuvastatin, 10 mg, Oral, Daily  sodium chloride, 10 mL, Intravenous, Q12H    Infusions   Diet  Diet: Cardiac Diets; Healthy Heart (2-3 Na+); Texture: Regular Texture (IDDSI 7); Fluid Consistency: Thin (IDDSI 0)    I have personally reviewed:  [x]  Laboratory   [x]  Microbiology   [x]  Radiology    [x]  EKG/Telemetry  [x]  Cardiology/Vascular   []  Pathology    []  Records       Assessment/Plan     Active Hospital Problems    Diagnosis  POA    **Syncope [R55]  Yes    Abnormal CT of the chest [R93.89]  Yes    ESRD (end stage renal disease) [N18.6]  Yes    History of stroke [Z86.73]  Not Applicable    Anemia, chronic disease [D63.8]  Yes    Essential hypertension [I10]  Yes    Coronary artery disease involving native coronary artery of native heart without angina pectoris [I25.10]  Yes    Chronic combined systolic and diastolic CHF (congestive heart failure) [I50.42]  Yes    Acquired hypothyroidism [E03.9]  Yes    Type 2 diabetes mellitus, with long-term current use of insulin [E11.9, Z79.4]  Not Applicable      Resolved Hospital Problems   No resolved problems to display.       68 y.o. male admitted with Syncope.    Cardiology evaluated and signed off--suspect syncope secondary to hypotension.  Coreg reduced then discontinued during hospital admission.  No plans for Zio patch at discharge given anticipated low yield.    ESRD managed by nephrologist.  Hemodialysis on 1/9/2024 (TTS PTA).  Tamsulosin discontinued for above.  Patient denies urinary retention at present.    Abnormal CT chest with tree-in-bud nodularities likely atypical infection.  Tolerating room air O2 saturation 98%.  Afebrile.  Absence of leukocytosis.    Paroxysmal atrial fibrillation on beta-blocker and ACE discontinued.    DM type II with borderline hypoglycemia noted this AM.  Drinks Boost every night (ordering scheduled Boost nightly starting on 1/9/2024).  Plan to switch Lantus 2 units subcu nightly to Lantus 2 units subcu starting tomorrow morning at 1/10/2024 & continue low-dose correctional sliding scale.    Constipation improved following soapsuds enema on 1/7/2024 with moderate results.  Bowel regimen continued as needed given RN reports of increased fecal output.  Add daily stool softener.    PT following recommend  SNF.      Eliquis (home med) for DVT prophylaxis.  Full code.  Discussed with patient and nursing staff.  Anticipate discharge to SNU facility vs home hopeful tomorrow pending wife's decision--CCP, RN, & patient unable to contact Mrs. Mo today       JI Sheriff  [facility]  01/09/24  16:00 EST

## 2024-01-09 NOTE — CASE MANAGEMENT/SOCIAL WORK
Continued Stay Note  [facility]     Patient Name: Carlito Mo  MRN: [id number]  Today's Date: 1/9/2024    Admit Date: 1/3/2024    Plan: Await wife return call (home vs SNF)   Discharge Plan       Row Name 01/09/24 2480       Plan    Plan Await wife return call (home vs SNF)    Plan Comments CCP spoke with Pt at bedside and he called spouse/Lissette on the speaker phone.  CCP informed them that Pt will not be able to get home health orders from his PCP until he has a f/u appt with her.  CCP also advised that [facility] has a bed available on Thursday if he decides to go that route.  Pt and spouse want to discuss it and call CCP back.  CCP made sure to tell Pt and spouse that either wa(if d/c home tomorrow or goes to rehab on Thurs), he will need transport from hospital and asked Lissette/spouse to please have her ringer on so she can transport him at d/c.  Lissette gave verbal understanding.  CCP will await Lissette return call with Pt decision of d/c plan, home vs SNF...............SRS/RNCM                   Discharge Codes    No documentation.                 Expected Discharge Date and Time       Expected Discharge Date Expected Discharge Time    Jan 9, 2024               Ame Cardoza RN

## 2024-01-09 NOTE — PROGRESS NOTES
[facility] Progress Note      Patient Name: Carlito Mo  : 1955  MRN: [id number]  Primary Care Physician:  Florencia Caballero MD  Date of admission: 1/3/2024    Subjective     Interval History:   Follow-up ESRD.  Did better with physical therapy yesterday, able to go up steps but needed moderate assistance.  Blood pressure this morning falls from 146 systolic lying to 110 standing.  Patient states that he is less symptomatic than he was prior to coming in the hospital.  Makes very little urine.  Discussed stopping the Flomax.  Patient reports his bowels are moving but he still feels as though he needs to defecate.     Review of Systems:   As noted above    Objective     Vitals:   Temp:  [97.5 °F (36.4 °C)-98.3 °F (36.8 °C)] 98.2 °F (36.8 °C)  Heart Rate:  [59-73] 68  Resp:  [18-20] 18  BP: (122-161)/(56-90) 161/56    Intake/Output Summary (Last 24 hours) at 2024 0836  Last data filed at 2024 0805  Gross per 24 hour   Intake 240 ml   Output 805 ml   Net -565 ml       Physical Exam:    General Appearance: Chronically ill.   On dialysis.   . 154 systolic. no acute distress   Skin: warm and dry  HEENT: Mucosa moist.  nonicteric sclera  Neck: supple, no JVD  Lungs clear to auscultation bilaterally  Heart:RRR no s3 or rub  Abdomen: soft, nontender, nondistended.+bs   Extremities: no edema.  Right upper extremity AV fistula.    Scheduled Meds:     apixaban, 2.5 mg, Oral, Q12H  budesonide-formoterol, 2 puff, Inhalation, BID - RT  buPROPion XL, 150 mg, Oral, Daily  carvedilol, 3.125 mg, Oral, BID With Meals  insulin glargine, 2 Units, Subcutaneous, Nightly  insulin lispro, 2-7 Units, Subcutaneous, 4x Daily AC & at Bedtime  levothyroxine, 75 mcg, Oral, Daily  pantoprazole, 40 mg, Oral, BID AC  rosuvastatin, 10 mg, Oral, Daily  senna-docusate sodium, 2 tablet, Oral, BID  sodium chloride, 10 mL, Intravenous, Q12H  tamsulosin, 0.4 mg, Oral, Daily      IV Meds:        Results  "Reviewed:   I have personally reviewed the results from the time of this admission to 1/9/2024 08:36 EST     Results from last 7 days   Lab Units 01/09/24 0305 01/08/24 0317 01/07/24 0552 01/04/24 0610 01/03/24  2320   SODIUM mmol/L 138 134* 139   < > 142   POTASSIUM mmol/L 4.4 4.1 4.3   < > 4.9   CHLORIDE mmol/L 107 102 103   < > 98   CO2 mmol/L 22.1 22.2 22.3   < > 21.6*   BUN mg/dL 40* 33* 26*   < > 40*   CREATININE mg/dL 3.32* 2.83* 2.31*   < > 4.64*   CALCIUM mg/dL 8.5* 8.4* 9.0   < > 9.8   BILIRUBIN mg/dL  --   --   --   --  0.4   ALK PHOS U/L  --   --   --   --  98   ALT (SGPT) U/L  --   --   --   --  33   AST (SGOT) U/L  --   --   --   --  52*   GLUCOSE mg/dL 59* 113* 74   < > 173*    < > = values in this interval not displayed.       Estimated Creatinine Clearance: 19.4 mL/min (A) (by C-G formula based on SCr of 3.32 mg/dL (H)).    Results from last 7 days   Lab Units 01/09/24 0305 01/08/24 0317 01/07/24 0552 01/05/24 0303 01/03/24  2320   MAGNESIUM mg/dL  --   --   --   --  2.4   PHOSPHORUS mg/dL 3.3 3.1 3.0   < >  --     < > = values in this interval not displayed.             Results from last 7 days   Lab Units 01/09/24 0305 01/08/24 0317 01/07/24 0552 01/06/24 0312 01/05/24  0303   WBC 10*3/mm3 4.37 5.04 4.60 5.04 5.30   HEMOGLOBIN g/dL 9.3* 9.9* 10.4* 9.6* 9.2*   PLATELETS 10*3/mm3 195 202 264 239 245       Results from last 7 days   Lab Units 01/04/24 0610 01/03/24  2320   INR  1.54* 1.54*       Assessment / Plan     ESRD.  Dialysis today.  Syncope, thought secondary to orthostasis.  I have asked the nurse to do his orthostatic blood pressures this morning.  On low-dose Coreg.  Paroxysmal atrial fibrillation  Chronic combined heart failure.  Variable ejection fraction most recent 40-45% on echo 3/8/2023.  Also with Fixed restrictive pattern, but no \"starry james\" appearance.  Dw Dr. Corona .  This may represent long standing hypertensive heart disease.    5.   Anemia of CKD.  6.   " DM2  PLAN:  Hemodialysis today.  2. DC Flomax.     Thank you for involving us in the care of Carlito Mo.  Please feel free to call with any questions.    Zahida Hsu MD  01/09/24  08:36 [facility]    [facility]  [phone]    Please note that portions of this note were completed with a voice recognition program.

## 2024-01-09 NOTE — PLAN OF CARE
Goal Outcome Evaluation:  Plan of Care Reviewed With: patient        Progress: improving  Outcome Evaluation: Patient still positive on his Orthostatics but he denies any s/s with this. Just received patient back from dialysis. Patient was dirty from having BM in bed during dialysis. CC trying to contact patient's wife to discuss discharge outcome but she won't answer calls from several of our staff. Will hold patient until we hear otherwise.

## 2024-01-09 NOTE — CASE MANAGEMENT/SOCIAL WORK
Continued Stay Note  [facility]     Patient Name: Carlito Mo  MRN: [id number]  Today's Date: 1/9/2024    Admit Date: 1/3/2024    Plan: Home vs SNF   Discharge Plan       Row Name 01/09/24 1221       Plan    Plan Home vs SNF    Plan Comments CCP has tried unsuccessfully three times to call Pt spouse Lissette at [phone] today to discuss that Pt discharge.  [facility]  will NOT be able to follow Pt until he follows up with his PCP.  [facility] advised pt does have some sub-acute rehab days available.  CCP trying to get in touch with wife to see if she wants home or SNF for patient.                   Discharge Codes    No documentation.                 Expected Discharge Date and Time       Expected Discharge Date Expected Discharge Time    Jan 9, 2024               Ame Cardoza RN

## 2024-01-10 LAB
ALBUMIN SERPL-MCNC: 3.3 G/DL (ref 3.5–5.2)
ANION GAP SERPL CALCULATED.3IONS-SCNC: 10.1 MMOL/L (ref 5–15)
BUN SERPL-MCNC: 25 MG/DL (ref 8–23)
BUN/CREAT SERPL: 10.2 (ref 7–25)
CALCIUM SPEC-SCNC: 8.5 MG/DL (ref 8.6–10.5)
CHLORIDE SERPL-SCNC: 104 MMOL/L (ref 98–107)
CO2 SERPL-SCNC: 23.9 MMOL/L (ref 22–29)
CREAT SERPL-MCNC: 2.44 MG/DL (ref 0.76–1.27)
EGFRCR SERPLBLD CKD-EPI 2021: 28.1 ML/MIN/1.73
GLUCOSE BLDC GLUCOMTR-MCNC: 111 MG/DL (ref 70–130)
GLUCOSE BLDC GLUCOMTR-MCNC: 180 MG/DL (ref 70–130)
GLUCOSE BLDC GLUCOMTR-MCNC: 181 MG/DL (ref 70–130)
GLUCOSE BLDC GLUCOMTR-MCNC: 96 MG/DL (ref 70–130)
GLUCOSE SERPL-MCNC: 77 MG/DL (ref 65–99)
PHOSPHATE SERPL-MCNC: 2.8 MG/DL (ref 2.5–4.5)
POTASSIUM SERPL-SCNC: 4.5 MMOL/L (ref 3.5–5.2)
SODIUM SERPL-SCNC: 138 MMOL/L (ref 136–145)

## 2024-01-10 PROCEDURE — 94799 UNLISTED PULMONARY SVC/PX: CPT

## 2024-01-10 PROCEDURE — 80069 RENAL FUNCTION PANEL: CPT | Performed by: NURSE PRACTITIONER

## 2024-01-10 PROCEDURE — 97110 THERAPEUTIC EXERCISES: CPT

## 2024-01-10 PROCEDURE — 63710000001 INSULIN LISPRO (HUMAN) PER 5 UNITS: Performed by: NURSE PRACTITIONER

## 2024-01-10 PROCEDURE — 82948 REAGENT STRIP/BLOOD GLUCOSE: CPT

## 2024-01-10 PROCEDURE — 63710000001 INSULIN GLARGINE PER 5 UNITS: Performed by: NURSE PRACTITIONER

## 2024-01-10 RX ORDER — LOPERAMIDE HYDROCHLORIDE 2 MG/1
2 CAPSULE ORAL ONCE
Status: COMPLETED | OUTPATIENT
Start: 2024-01-10 | End: 2024-01-10

## 2024-01-10 RX ADMIN — INSULIN GLARGINE 2 UNITS: 100 INJECTION, SOLUTION SUBCUTANEOUS at 09:49

## 2024-01-10 RX ADMIN — BUPROPION HYDROCHLORIDE 150 MG: 150 TABLET, EXTENDED RELEASE ORAL at 09:49

## 2024-01-10 RX ADMIN — LEVOTHYROXINE SODIUM 75 MCG: 75 TABLET ORAL at 06:31

## 2024-01-10 RX ADMIN — Medication 10 ML: at 21:41

## 2024-01-10 RX ADMIN — CARVEDILOL 3.12 MG: 3.12 TABLET, FILM COATED ORAL at 09:49

## 2024-01-10 RX ADMIN — APIXABAN 2.5 MG: 2.5 TABLET, FILM COATED ORAL at 09:49

## 2024-01-10 RX ADMIN — PANTOPRAZOLE SODIUM 40 MG: 40 TABLET, DELAYED RELEASE ORAL at 06:31

## 2024-01-10 RX ADMIN — Medication 10 ML: at 09:55

## 2024-01-10 RX ADMIN — INSULIN LISPRO 2 UNITS: 100 INJECTION, SOLUTION INTRAVENOUS; SUBCUTANEOUS at 18:10

## 2024-01-10 RX ADMIN — BUDESONIDE AND FORMOTEROL FUMARATE DIHYDRATE 2 PUFF: 160; 4.5 AEROSOL RESPIRATORY (INHALATION) at 19:34

## 2024-01-10 RX ADMIN — APIXABAN 2.5 MG: 2.5 TABLET, FILM COATED ORAL at 21:41

## 2024-01-10 RX ADMIN — ROSUVASTATIN CALCIUM 10 MG: 10 TABLET, FILM COATED ORAL at 09:50

## 2024-01-10 RX ADMIN — PANTOPRAZOLE SODIUM 40 MG: 40 TABLET, DELAYED RELEASE ORAL at 18:10

## 2024-01-10 RX ADMIN — LOPERAMIDE HYDROCHLORIDE 2 MG: 2 CAPSULE ORAL at 12:28

## 2024-01-10 RX ADMIN — INSULIN LISPRO 2 UNITS: 100 INJECTION, SOLUTION INTRAVENOUS; SUBCUTANEOUS at 12:30

## 2024-01-10 NOTE — CASE MANAGEMENT/SOCIAL WORK
Continued Stay Note  [facility]     Patient Name: Carlito Mo  MRN: [id number]  Today's Date: 1/10/2024    Admit Date: 1/3/2024    Plan: [facility] accepted.  CCP awaiting confirmation of hemodialysis chair time at rehab for Saturday; Wife will need to transport at d/c.   Discharge Plan       Row Name 01/10/24 1432       Plan    Plan [facility] accepted.  CCP awaiting confirmation of hemodialysis chair time at rehab for Saturday; Wife will need to transport at d/c.    Plan Comments Per Jean-Paul/Noni Teague, Pt is approved for a rehab bed he is just waiting on [employer] (the HD company on site at [facility]), to confirm Pt has a hemodialysis chair time for Saturday.  CCP following...........Ame BERNAL/DEMAR CM                   Discharge Codes    No documentation.                 Expected Discharge Date and Time       Expected Discharge Date Expected Discharge Time    Eleazar 10, 2024               Ame Cardoza RN

## 2024-01-10 NOTE — THERAPY TREATMENT NOTE
Patient Name: Carlito Mo  : 1955    MRN: [id number]                              Today's Date: 1/10/2024       Admit Date: 1/3/2024    Visit Dx:     ICD-10-CM ICD-9-CM   1. Syncope, unspecified syncope type  R55 780.2   2. ESRD (end stage renal disease)  N18.6 585.6     Patient Active Problem List   Diagnosis    Major depressive disorder with single episode, in partial remission    Other hyperlipidemia    Type 2 diabetes mellitus, with long-term current use of insulin    Vitamin D deficiency    Erectile dysfunction    Chronic fatigue    Type 2 diabetes mellitus with ophthalmic complication    Skin lesion of chest wall    Slow transit constipation    Benign prostatic hyperplasia with nocturia    History of basal cell carcinoma    High blood pressure associated with diabetes    Acquired hypothyroidism    Hypertensive urgency    Recurrent strokes    Noncompliance w/medication treatment due to intermit use of medication    Urinary retention    Pneumonia    Chronic combined systolic and diastolic CHF (congestive heart failure)    Acute respiratory failure with hypoxia    Suspected sleep apnea    Pleural effusion    YAW (obstructive sleep apnea)    Coronary artery disease involving native coronary artery of native heart without angina pectoris    Chronically elevated troponin    Colon cancer screening    Enlarged lymph nodes    Functional gait abnormality    History of myocardial infarction    Hospital discharge follow-up    Insomnia    Iron deficiency anemia    Major depression, recurrent    Anemia, chronic renal failure, stage 3 (moderate)    Essential hypertension    Adverse effect of iron and its compounds, initial encounter    Acute on chronic renal insufficiency    Debility    Falls frequently    Acute pain of right shoulder    Acute on chronic anemia    Heme positive stool    Neurogenic orthostatic hypotension    Anemia, chronic disease    History of colon polyps    Helicobacter pylori gastritis     Esophagitis    Sleep related hypoxia    Embolic stroke    Patent foramen ovale    Pleural effusion, bilateral    Cardiomyopathy    Lower abdominal pain    Diarrhea    CKD (chronic kidney disease) stage 4, GFR 15-29 ml/min    Hypertension not at goal    Memory loss due to medical condition    Generalized weakness    ERRONEOUS ENCOUNTER--DISREGARD    Weakness    Paroxysmal atrial fibrillation    Chronic anticoagulation    Multiple falls    Dehydration    SYLVAIN (acute kidney injury)    Acute ischemic stroke    Acute combined systolic and diastolic congestive heart failure    History of stroke    Iron deficiency anemia    Acute HFrEF (heart failure with reduced ejection fraction)    Atrial fibrillation with rapid ventricular response    ESRD (end stage renal disease)    Nausea & vomiting    Hemoptysis    Coffee ground emesis    Severe malnutrition    Chronic HFrEF (heart failure with reduced ejection fraction)    Syncope    Abnormal CT of the chest     Past Medical History:   Diagnosis Date    Acquired hypothyroidism     Acute congestive heart failure     Acute respiratory failure with hypoxia     Acute sinusitis     SYLVAIN (acute kidney injury)     on CKD    Anemia     Arthritis     CAD (coronary artery disease)     Cancer     skin    Chronic combined systolic and diastolic congestive heart failure     Chronic ischemic heart disease     Chronic kidney disease (CKD)     CKD (chronic kidney disease)     COPD (chronic obstructive pulmonary disease)     Depression     Diabetes mellitus type 2, uncontrolled, without complications     Diabetic neuropathy     Disease of thyroid gland     Elevated cholesterol     Fatigue     Frequent PVCs     Generalized weakness     GERD (gastroesophageal reflux disease)     Heart attack     History of coronary artery disease     with remote history of bypass surgery in 2001    Hyperlipidemia     Hypertension     Hypertensive encephalopathy     Mental status change     Acute    Nausea & vomiting  12/01/2023    NO DEVICE/RECALLED     Pleural effusion     Pneumonia     Poor historian     RBBB (right bundle branch block)     Stroke     POOR VISION    TIA (transient ischemic attack)     Type 2 diabetes mellitus     Urinary retention     Vitamin D deficiency      Past Surgical History:   Procedure Laterality Date    APPENDECTOMY N/A 02/14/2016    Dr. Alexey Dodson    ARTERIOVENOUS FISTULA/SHUNT SURGERY Right 06/03/2022    Procedure: RIGHT FOREARM ARTERIOVENOUS FISTULA PLACEMENT RADIOCEPHALIC WITH CEPHALIC VEIN TRANSPOSITION;  Surgeon: Eliseo Willis MD;  Location: [facility] MAIN OR;  Service: Vascular;  Laterality: Right;    COLONOSCOPY      over 20 years ago.  no polyps     COLONOSCOPY N/A 09/18/2018    Procedure: COLONOSCOPY;  Surgeon: Jose Enrique Louie MD;  Location: [facility] ENDOSCOPY;  Service: Gastroenterology    COLONOSCOPY N/A 09/19/2018    IH, EH, polyps (TAs w/low grade dysplasia)    COLONOSCOPY N/A 06/01/2020    Procedure: COLONOSCOPY;  Surgeon: Jose Enrique Louie MD;  Location: [facility] ENDOSCOPY;  Service: Gastroenterology;  Laterality: N/A;  Pre op-Anemia, Melena, History of Polyps  Post op-To Cecum/TI, Polyp, Poor Prep    CORONARY ARTERY BYPASS GRAFT  11/2001    ENDOSCOPY N/A 05/29/2020    Procedure: ESOPHAGOGASTRODUODENOSCOPY with biopsies;  Surgeon: Amanda Lovelace MD;  Location: [facility] ENDOSCOPY;  Service: Gastroenterology;  Laterality: N/A;  pre-anemia, dark stools  post-esophagitis, hiatal hernia    ENDOSCOPY N/A 09/15/2020    Procedure: ESOPHAGOGASTRODUODENOSCOPY WITH BIOPSIES;  Surgeon: Jose Enrique Louie MD;  Location: [facility] ENDOSCOPY;  Service: Gastroenterology;  Laterality: N/A;  pre: history of melena and esophagitis  post: mild esophagitis and gastritis, small hiatal hernia    ENDOSCOPY N/A 12/4/2023    Procedure: ESOPHAGOGASTRODUODENOSCOPY with bx;  Surgeon: Quoc Elmore MD;  Location: [facility] ENDOSCOPY;  Service: Gastroenterology;  Laterality: N/A;  pre: Coffee-ground  emesis  post: hiatal hernia, esophagitis    HERNIA REPAIR Left 12/05/2019    THORACENTESIS      TONSILLECTOMY      VASECTOMY        General Information       Row Name 01/10/24 1147          Physical Therapy Time and Intention    Document Type therapy note (daily note)  -PC     Mode of Treatment physical therapy  -       Row Name 01/10/24 1147          Cognition    Orientation Status (Cognition) oriented x 3  -PC               User Key  (r) = Recorded By, (t) = Taken By, (c) = Cosigned By      Initials Name Provider Type    PC Denise Espinal, PT Physical Therapist                   Mobility       Row Name 01/10/24 1148          Bed Mobility    Supine-Sit [place] (Bed Mobility) standby assist  -PC     Sit-Supine [place] (Bed Mobility) standby assist  -PC       Row Name 01/10/24 1148          Sit-Stand Transfer    Sit-Stand [place] (Transfers) minimum assist (75% patient effort)  -PC       Row Name 01/10/24 1148          Gait/Stairs (Locomotion)    [place] Level (Gait) minimum assist (75% patient effort)  -PC     Assistive Device (Gait) walker, front-wheeled  -PC     Distance in Feet (Gait) 70 ft  -PC     Deviations/Abnormal Patterns (Gait) festinating/shuffling;gait speed decreased  -PC     Bilateral Gait Deviations forward flexed posture  -PC     Comment, (Gait/Stairs) dizziness with walking, checked his BP, once seated back on edge of bed 113/64, pt said dizziness was not worse or better with change of position, prior to getting up BP was 149/93  -PC               User Key  (r) = Recorded By, (t) = Taken By, (c) = Cosigned By      Initials Name Provider Type    PC Denise Espinal, PT Physical Therapist                   Obj/Interventions    No documentation.                  Goals/Plan    No documentation.                  Clinical Impression       Row Name 01/10/24 1151          Pain    Pretreatment Pain Rating 0/10 - no pain  -PC       Row Name 01/10/24 1151          Plan of Care Review     Plan of Care Reviewed With patient  -PC     Outcome Evaluation Pt cont to have unsteady gait, weakness, dizziness affecting his safety with mobilizing, currently needs hands on assist at all times for safety. Pt is thin and frail with poor toleration of activity, he did c/o dizziness with walking and after walking his BP was 113/64, prior to getting up it was 149/93. Pt has a mutlilevel home and does not have 24 hour assist, pt would benefit from SNF at discharge  -PC       Row Name 01/10/24 1151          Vital Signs    Pre Systolic BP Rehab 149  -PC     Pre Treatment Diastolic BP 93  -PC     Post Systolic BP Rehab 113  -PC     Post Treatment Diastolic BP 64  -PC       Row Name 01/10/24 1151          Positioning and Restraints    Pre-Treatment Position in bed  -PC     Post Treatment Position bed  -PC     In Bed supine;call light within reach;encouraged to call for assist;exit alarm on  -PC               User Key  (r) = Recorded By, (t) = Taken By, (c) = Cosigned By      Initials Name Provider Type    Denise Arrington PT Physical Therapist                   Outcome Measures       Row Name 01/10/24 1156          How much help from another person do you currently need...    Turning from your back to your side while in flat bed without using bedrails? 4  -PC     Moving from lying on back to sitting on the side of a flat bed without bedrails? 3  -PC     Moving to and from a bed to a chair (including a wheelchair)? 3  -PC     Standing up from a chair using your arms (e.g., wheelchair, bedside chair)? 3  -PC     Climbing 3-5 steps with a railing? 2  -PC     To walk in hospital room? 3  -PC     AM-PAC 6 Clicks Score (PT) 18  -PC     Highest Level of Mobility Goal 6 --> Walk 10 steps or more  -PC               User Key  (r) = Recorded By, (t) = Taken By, (c) = Cosigned By      Initials Name Provider Type    Denise Arrington PT Physical Therapist                                 Physical Therapy Education       Title: PT  OT SLP Therapies (Done)       Topic: Physical Therapy (Done)       Point: Mobility training (Done)       Learning Progress Summary             Patient Acceptance, E,D, DU,NR by  at 1/10/2024 1157    Acceptance, E,TB, VU by  at 1/7/2024 0827    Acceptance, E, NR by AR at 1/5/2024 1537                         Point: Home exercise program (Done)       Learning Progress Summary             Patient Acceptance, E,D, DU,NR by  at 1/10/2024 1157    Acceptance, E,TB, VU by  at 1/7/2024 0827    Acceptance, E, NR by AR at 1/5/2024 1537                         Point: Body mechanics (Done)       Learning Progress Summary             Patient Acceptance, E,D, DU,NR by  at 1/10/2024 1157    Acceptance, E,TB, VU by  at 1/7/2024 0827    Acceptance, E, NR by AR at 1/5/2024 1537                         Point: Precautions (Done)       Learning Progress Summary             Patient Acceptance, E,D, DU,NR by  at 1/10/2024 1157    Acceptance, E,TB, VU by  at 1/7/2024 0827    Acceptance, E, NR by AR at 1/5/2024 1537                                         User Key       Initials Effective Dates Name Provider Type Discipline     06/16/21 -  Denise Espinal PT Physical Therapist PT    AR 06/16/21 -  Kellen Archibald PT Physical Therapist PT     06/16/21 -  Marianne Jonas, RN Registered Nurse Nurse                  PT Recommendation and Plan     Plan of Care Reviewed With: patient  Outcome Evaluation: Pt cont to have unsteady gait, weakness, dizziness affecting his safety with mobilizing, currently needs hands on assist at all times for safety. Pt is thin and frail with poor toleration of activity, he did c/o dizziness with walking and after walking his BP was 113/64, prior to getting up it was 149/93. Pt has a mutlilevel home and does not have 24 hour assist, pt would benefit from SNF at discharge     Time Calculation:         PT Charges       Row Name 01/10/24 1158             Time Calculation    Start Time 0845  -       Stop Time 0903  -PC      Time Calculation (min) 18 min  -PC      PT Received On 01/10/24  -PC      PT - Next Appointment 01/11/24  -PC                User Key  (r) = Recorded By, (t) = Taken By, (c) = Cosigned By      Initials Name Provider Type    PC Denise Espinal, PT Physical Therapist                  Therapy Charges for Today       Code Description Service Date Service Provider Modifiers Qty    24141068439 HC PT THER PROC EA 15 MIN 1/10/2024 Denise Espinal PT GP 1            PT G-Codes  Outcome Measure Options: AM-PAC 6 Clicks Basic Mobility (PT)  AM-PAC 6 Clicks Score (PT): 18  PT Discharge Summary  Anticipated Discharge Disposition (PT): skilled nursing facility    Denise Espinal PT  1/10/2024

## 2024-01-10 NOTE — PROGRESS NOTES
Name: Carlito Mo ADMIT: 1/3/2024   : 1955  PCP: Florencia Caballero MD    MRN: [id number] LOS: 6 days   AGE/SEX: 68 y.o. male  ROOM:      Subjective   Subjective   Patient appears comfortable, generally weak, and in no apparent distress.  Tolerating intake.  Historically unaware of low glucose readings.  Reportedly drinks boost every night.  Patient choosing rehab after discharge.  Discussed with RN--states loose stools today despite reduction in bowel regimen.       Objective   Objective   Vital Signs  Temp:  [98 °F (36.7 °C)-98.9 °F (37.2 °C)] 98.5 °F (36.9 °C)  Heart Rate:  [55-73] 68  Resp:  [16] 16  BP: (121-154)/(61-82) 121/75  SpO2:  [97 %-100 %] 98 %  on   ;   Device (Oxygen Therapy): room air  Body mass index is 19.35 kg/m².    Physical Exam  Constitutional:       General: He is not in acute distress.     Appearance: He is not toxic-appearing.   Cardiovascular:      Rate and Rhythm: Normal rate.      Heart sounds: Normal heart sounds.   Pulmonary:      Effort: Pulmonary effort is normal.      Comments: Diminished on expiration anteriorly  Abdominal:      General: Bowel sounds are normal. There is no distension.      Palpations: Abdomen is soft.      Tenderness: There is no abdominal tenderness. There is no guarding.   Musculoskeletal:      Right lower leg: No edema.      Left lower leg: No edema.   Skin:     General: Skin is warm and dry.   Neurological:      Mental Status: He is alert.     *Physical exam performed on 1/10/2024 as per above    Results Review     I reviewed the patient's new clinical results.  Results from last 7 days   Lab Units 24  0305 24  0317 24  0552 24  0312   WBC 10*3/mm3 4.37 5.04 4.60 5.04   HEMOGLOBIN g/dL 9.3* 9.9* 10.4* 9.6*   PLATELETS 10*3/mm3 195 202 264 239     Results from last 7 days   Lab Units 24  0300 01/10/24  0335 24  0305 24  0317   SODIUM mmol/L 136 138 138 134*   POTASSIUM mmol/L 4.7 4.5 4.4 4.1   CHLORIDE  mmol/L 103 104 107 102   CO2 mmol/L 22.0 23.9 22.1 22.2   BUN mg/dL 41* 25* 40* 33*   CREATININE mg/dL 3.13* 2.44* 3.32* 2.83*   GLUCOSE mg/dL 114* 77 59* 113*   EGFR mL/min/1.73 20.8* 28.1* 19.4* 23.5*     Results from last 7 days   Lab Units 01/11/24  0300 01/10/24  0335 01/09/24  0305 01/08/24  0317   ALBUMIN g/dL 3.4* 3.3* 3.4* 3.3*     Results from last 7 days   Lab Units 01/11/24  0300 01/10/24  0335 01/09/24  0305 01/08/24  0317   CALCIUM mg/dL 8.6 8.5* 8.5* 8.4*   ALBUMIN g/dL 3.4* 3.3* 3.4* 3.3*   PHOSPHORUS mg/dL 3.3 2.8 3.3 3.1       Glucose   Date/Time Value Ref Range Status   01/11/2024 0631 78 70 - 130 mg/dL Final   01/10/2024 2134 111 70 - 130 mg/dL Final   01/10/2024 1629 180 (H) 70 - 130 mg/dL Final   01/10/2024 1112 181 (H) 70 - 130 mg/dL Final   01/10/2024 0627 96 70 - 130 mg/dL Final   01/09/2024 2015 156 (H) 70 - 130 mg/dL Final   01/09/2024 1641 167 (H) 70 - 130 mg/dL Final       No radiology results for the last day    I have personally reviewed all medications:  Scheduled Medications  apixaban, 2.5 mg, Oral, Q12H  budesonide-formoterol, 2 puff, Inhalation, BID - RT  buPROPion XL, 150 mg, Oral, Daily  insulin glargine, 2 Units, Subcutaneous, Daily  insulin lispro, 2-7 Units, Subcutaneous, 4x Daily AC & at Bedtime  levothyroxine, 75 mcg, Oral, Daily  pantoprazole, 40 mg, Oral, BID AC  rosuvastatin, 10 mg, Oral, Daily  sodium chloride, 10 mL, Intravenous, Q12H    Infusions   Diet  Diet: Cardiac Diets; Healthy Heart (2-3 Na+); Texture: Regular Texture (IDDSI 7); Fluid Consistency: Thin (IDDSI 0)    I have personally reviewed:  [x]  Laboratory   []  Microbiology   []  Radiology   [x]  EKG/Telemetry  []  Cardiology/Vascular   []  Pathology    []  Records       Assessment/Plan     Active Hospital Problems    Diagnosis  POA    **Syncope [R55]  Yes    Abnormal CT of the chest [R93.89]  Yes    ESRD (end stage renal disease) [N18.6]  Yes    History of stroke [Z86.73]  Not Applicable    Anemia, chronic  disease [D63.8]  Yes    Essential hypertension [I10]  Yes    Coronary artery disease involving native coronary artery of native heart without angina pectoris [I25.10]  Yes    Chronic combined systolic and diastolic CHF (congestive heart failure) [I50.42]  Yes    Acquired hypothyroidism [E03.9]  Yes    Type 2 diabetes mellitus, with long-term current use of insulin [E11.9, Z79.4]  Not Applicable      Resolved Hospital Problems   No resolved problems to display.       68 y.o. male admitted with Syncope.    Cardiology evaluated and signed off--suspect syncope secondary to hypotension.  Coreg discontinued during hospital admission (BP and heart rate acceptable acutely) per nephrology.  No plans for Zio patch at discharge given anticipated low yield.    ESRD managed by nephrologist.  Hemodialysis on 1/9/2024 (TTS PTA).  Tamsulosin discontinued for above.  Denies recent urinary retention.    Abnormal CT chest with tree-in-bud nodularities likely atypical infection.  Tolerating room air O2 saturation 98%.  Afebrile.  Absence of leukocytosis.    Paroxysmal atrial fibrillation on beta-blocker and ACE discontinued.    DM type II with improved / stabilized glucose trends today.  Continue Lantus 2 units subcu daily & continue low-dose correctional sliding scale.    Constipation improved following soapsuds enema on 1/7/2024 with moderate results.  Bowel regimen continued as needed given RN reports of increased fecal output.  Discontinue daily stool softener.  Ordering Imodium once given no concerns for infectious diarrhea.    PT following recommend SNF.      Eliquis (home med) for DVT prophylaxis.  Full code.  Discussed with patient and nursing staff.  Anticipate discharge to SNU facility on 1/11/2024--CCP following      JI Sheriff  [facility]  01/11/24  13:02 EST

## 2024-01-10 NOTE — PROGRESS NOTES
[facility] Progress Note      Patient Name: Carlito Mo  : 1955  MRN: [id number]  Primary Care Physician:  Florencia Caballero MD  Date of admission: 1/3/2024    Subjective     Interval History:   Follow-up ESRD.  Feels tired today.  Says he has not been up yet.  Bowels moving. PT tells me his bp prior to walking was 149 systolic.  He was complaining of dizziness on return to his room blood pressure systolic was 113.    Review of Systems:   As noted above    Objective     Vitals:   Temp:  [97.3 °F (36.3 °C)-99 °F (37.2 °C)] 98.6 °F (37 °C)  Heart Rate:  [59-69] 60  Resp:  [16] 16  BP: (113-149)/(57-93) 113/64    Intake/Output Summary (Last 24 hours) at 1/10/2024 1149  Last data filed at 1/10/2024 1030  Gross per 24 hour   Intake 480 ml   Output 1650 ml   Net -1170 ml       Physical Exam:    General Appearance: Chronically ill.   Sleeping, but awakens easily.  Goes back to sleep.  Skin: warm and dry  HEENT: Mucosa moist.  nonicteric sclera  Neck: supple, no JVD  Lungs clear to auscultation bilaterally  Heart:RRR no s3 or rub  Abdomen: soft, nontender, nondistended.+bs   Extremities: no edema.  Right upper extremity AV fistula.    Scheduled Meds:     apixaban, 2.5 mg, Oral, Q12H  budesonide-formoterol, 2 puff, Inhalation, BID - RT  buPROPion XL, 150 mg, Oral, Daily  carvedilol, 3.125 mg, Oral, BID With Meals  insulin glargine, 2 Units, Subcutaneous, Daily  insulin lispro, 2-7 Units, Subcutaneous, 4x Daily AC & at Bedtime  levothyroxine, 75 mcg, Oral, Daily  loperamide, 2 mg, Oral, Once  pantoprazole, 40 mg, Oral, BID AC  rosuvastatin, 10 mg, Oral, Daily  sodium chloride, 10 mL, Intravenous, Q12H      IV Meds:        Results Reviewed:   I have personally reviewed the results from the time of this admission to 1/10/2024 11:49 EST     Results from last 7 days   Lab Units 01/10/24  0335 24  0305 24  0317 24  0610 24  2320   SODIUM mmol/L 138 138 134*   < > 142  "  POTASSIUM mmol/L 4.5 4.4 4.1   < > 4.9   CHLORIDE mmol/L 104 107 102   < > 98   CO2 mmol/L 23.9 22.1 22.2   < > 21.6*   BUN mg/dL 25* 40* 33*   < > 40*   CREATININE mg/dL 2.44* 3.32* 2.83*   < > 4.64*   CALCIUM mg/dL 8.5* 8.5* 8.4*   < > 9.8   BILIRUBIN mg/dL  --   --   --   --  0.4   ALK PHOS U/L  --   --   --   --  98   ALT (SGPT) U/L  --   --   --   --  33   AST (SGOT) U/L  --   --   --   --  52*   GLUCOSE mg/dL 77 59* 113*   < > 173*    < > = values in this interval not displayed.       Estimated Creatinine Clearance: 26.8 mL/min (A) (by C-G formula based on SCr of 2.44 mg/dL (H)).    Results from last 7 days   Lab Units 01/10/24  0335 01/09/24  0305 01/08/24 0317 01/05/24  0303 01/03/24  2320   MAGNESIUM mg/dL  --   --   --   --  2.4   PHOSPHORUS mg/dL 2.8 3.3 3.1   < >  --     < > = values in this interval not displayed.             Results from last 7 days   Lab Units 01/09/24  0305 01/08/24 0317 01/07/24  0552 01/06/24  0312 01/05/24  0303   WBC 10*3/mm3 4.37 5.04 4.60 5.04 5.30   HEMOGLOBIN g/dL 9.3* 9.9* 10.4* 9.6* 9.2*   PLATELETS 10*3/mm3 195 202 264 239 245       Results from last 7 days   Lab Units 01/04/24  0610 01/03/24  2320   INR  1.54* 1.54*       Assessment / Plan     ESRD.  Dialysis Tuesday Thursday Saturday.  Syncope, thought secondary to orthostasis.  I stopped his Flomax yesterday.  On low-dose Coreg for PAF.  Will ask Dr. Corona if okay to discontinue it.  Paroxysmal atrial fibrillation  Chronic combined heart failure.  Variable ejection fraction most recent 40-45% on echo 3/8/2023.  Also with Fixed restrictive pattern, but no \"starry james\" appearance.  Dw Dr. Corona yesterday.  This may represent long standing hypertensive heart disease.    5.   Anemia of CKD.  6.   DM2  7.  Hypothyroid on replacement.  PLAN:  Hemodialysis tomorrow.   2.   Dc coreg    Thank you for involving us in the care of Carlito Mo.  Please feel free to call with any questions.    Zahida Hsu, " MD  01/10/24  11:49 EST    [facility]  [phone]    Please note that portions of this note were completed with a voice recognition program.

## 2024-01-10 NOTE — PLAN OF CARE
Goal Outcome Evaluation:  Plan of Care Reviewed With: patient           Outcome Evaluation: Pt cont to have unsteady gait, weakness, dizziness affecting his safety with mobilizing, currently needs hands on assist at all times for safety. Pt is thin and frail with poor toleration of activity, he did c/o dizziness with walking and after walking his BP was 113/64, prior to getting up it was 149/93. Pt has a mutlilevel home and does not have 24 hour assist, pt would benefit from SNF at discharge      Anticipated Discharge Disposition (PT): skilled nursing facility

## 2024-01-10 NOTE — PLAN OF CARE
Goal Outcome Evaluation:  Plan of Care Reviewed With: patient        Progress: improving  Outcome Evaluation: Pt received HD on 1/9/24, did have positive orthostatic pressures post HD session. Pt uses walker due to weakness, however expected to be discharged on today. Wife will provide transportation. No acute complaints overnight. Will continue to monitor

## 2024-01-11 VITALS
HEIGHT: 72 IN | OXYGEN SATURATION: 100 % | WEIGHT: 142.7 LBS | DIASTOLIC BLOOD PRESSURE: 78 MMHG | HEART RATE: 73 BPM | SYSTOLIC BLOOD PRESSURE: 142 MMHG | RESPIRATION RATE: 6 BRPM | TEMPERATURE: 97.1 F | BODY MASS INDEX: 19.33 KG/M2

## 2024-01-11 LAB
ALBUMIN SERPL-MCNC: 3.4 G/DL (ref 3.5–5.2)
ANION GAP SERPL CALCULATED.3IONS-SCNC: 11 MMOL/L (ref 5–15)
BUN SERPL-MCNC: 41 MG/DL (ref 8–23)
BUN/CREAT SERPL: 13.1 (ref 7–25)
CALCIUM SPEC-SCNC: 8.6 MG/DL (ref 8.6–10.5)
CHLORIDE SERPL-SCNC: 103 MMOL/L (ref 98–107)
CO2 SERPL-SCNC: 22 MMOL/L (ref 22–29)
CREAT SERPL-MCNC: 3.13 MG/DL (ref 0.76–1.27)
EGFRCR SERPLBLD CKD-EPI 2021: 20.8 ML/MIN/1.73
GLUCOSE BLDC GLUCOMTR-MCNC: 105 MG/DL (ref 70–130)
GLUCOSE BLDC GLUCOMTR-MCNC: 78 MG/DL (ref 70–130)
GLUCOSE SERPL-MCNC: 114 MG/DL (ref 65–99)
PHOSPHATE SERPL-MCNC: 3.3 MG/DL (ref 2.5–4.5)
POTASSIUM SERPL-SCNC: 4.7 MMOL/L (ref 3.5–5.2)
SODIUM SERPL-SCNC: 136 MMOL/L (ref 136–145)

## 2024-01-11 PROCEDURE — 80069 RENAL FUNCTION PANEL: CPT | Performed by: NURSE PRACTITIONER

## 2024-01-11 PROCEDURE — 94761 N-INVAS EAR/PLS OXIMETRY MLT: CPT

## 2024-01-11 PROCEDURE — 94664 DEMO&/EVAL PT USE INHALER: CPT

## 2024-01-11 PROCEDURE — 63710000001 INSULIN GLARGINE PER 5 UNITS: Performed by: NURSE PRACTITIONER

## 2024-01-11 PROCEDURE — 94799 UNLISTED PULMONARY SVC/PX: CPT

## 2024-01-11 PROCEDURE — 82948 REAGENT STRIP/BLOOD GLUCOSE: CPT

## 2024-01-11 PROCEDURE — 97530 THERAPEUTIC ACTIVITIES: CPT

## 2024-01-11 RX ADMIN — INSULIN GLARGINE 2 UNITS: 100 INJECTION, SOLUTION SUBCUTANEOUS at 09:11

## 2024-01-11 RX ADMIN — PANTOPRAZOLE SODIUM 40 MG: 40 TABLET, DELAYED RELEASE ORAL at 18:31

## 2024-01-11 RX ADMIN — BUDESONIDE AND FORMOTEROL FUMARATE DIHYDRATE 2 PUFF: 160; 4.5 AEROSOL RESPIRATORY (INHALATION) at 06:42

## 2024-01-11 RX ADMIN — PANTOPRAZOLE SODIUM 40 MG: 40 TABLET, DELAYED RELEASE ORAL at 06:40

## 2024-01-11 RX ADMIN — Medication 10 ML: at 09:11

## 2024-01-11 RX ADMIN — ROSUVASTATIN CALCIUM 10 MG: 10 TABLET, FILM COATED ORAL at 09:11

## 2024-01-11 RX ADMIN — APIXABAN 2.5 MG: 2.5 TABLET, FILM COATED ORAL at 09:11

## 2024-01-11 RX ADMIN — LEVOTHYROXINE SODIUM 75 MCG: 75 TABLET ORAL at 06:40

## 2024-01-11 RX ADMIN — BUPROPION HYDROCHLORIDE 150 MG: 150 TABLET, EXTENDED RELEASE ORAL at 09:11

## 2024-01-11 NOTE — THERAPY TREATMENT NOTE
Patient Name: Carlito Mo  : 1955    MRN: [id number]                              Today's Date: 2024       Admit Date: 1/3/2024    Visit Dx:     ICD-10-CM ICD-9-CM   1. Syncope, unspecified syncope type  R55 780.2   2. ESRD (end stage renal disease)  N18.6 585.6     Patient Active Problem List   Diagnosis    Major depressive disorder with single episode, in partial remission    Other hyperlipidemia    Type 2 diabetes mellitus, with long-term current use of insulin    Vitamin D deficiency    Erectile dysfunction    Chronic fatigue    Type 2 diabetes mellitus with ophthalmic complication    Skin lesion of chest wall    Slow transit constipation    Benign prostatic hyperplasia with nocturia    History of basal cell carcinoma    High blood pressure associated with diabetes    Acquired hypothyroidism    Hypertensive urgency    Recurrent strokes    Noncompliance w/medication treatment due to intermit use of medication    Urinary retention    Pneumonia    Chronic combined systolic and diastolic CHF (congestive heart failure)    Acute respiratory failure with hypoxia    Suspected sleep apnea    Pleural effusion    YAW (obstructive sleep apnea)    Coronary artery disease involving native coronary artery of native heart without angina pectoris    Chronically elevated troponin    Colon cancer screening    Enlarged lymph nodes    Functional gait abnormality    History of myocardial infarction    Hospital discharge follow-up    Insomnia    Iron deficiency anemia    Major depression, recurrent    Anemia, chronic renal failure, stage 3 (moderate)    Essential hypertension    Adverse effect of iron and its compounds, initial encounter    Acute on chronic renal insufficiency    Debility    Falls frequently    Acute pain of right shoulder    Acute on chronic anemia    Heme positive stool    Neurogenic orthostatic hypotension    Anemia, chronic disease    History of colon polyps    Helicobacter pylori gastritis     Esophagitis    Sleep related hypoxia    Embolic stroke    Patent foramen ovale    Pleural effusion, bilateral    Cardiomyopathy    Lower abdominal pain    Diarrhea    CKD (chronic kidney disease) stage 4, GFR 15-29 ml/min    Hypertension not at goal    Memory loss due to medical condition    Generalized weakness    ERRONEOUS ENCOUNTER--DISREGARD    Weakness    Paroxysmal atrial fibrillation    Chronic anticoagulation    Multiple falls    Dehydration    SYLVAIN (acute kidney injury)    Acute ischemic stroke    Acute combined systolic and diastolic congestive heart failure    History of stroke    Iron deficiency anemia    Acute HFrEF (heart failure with reduced ejection fraction)    Atrial fibrillation with rapid ventricular response    ESRD (end stage renal disease)    Nausea & vomiting    Hemoptysis    Coffee ground emesis    Severe malnutrition    Chronic HFrEF (heart failure with reduced ejection fraction)    Syncope    Abnormal CT of the chest     Past Medical History:   Diagnosis Date    Acquired hypothyroidism     Acute congestive heart failure     Acute respiratory failure with hypoxia     Acute sinusitis     SYLVAIN (acute kidney injury)     on CKD    Anemia     Arthritis     CAD (coronary artery disease)     Cancer     skin    Chronic combined systolic and diastolic congestive heart failure     Chronic ischemic heart disease     Chronic kidney disease (CKD)     CKD (chronic kidney disease)     COPD (chronic obstructive pulmonary disease)     Depression     Diabetes mellitus type 2, uncontrolled, without complications     Diabetic neuropathy     Disease of thyroid gland     Elevated cholesterol     Fatigue     Frequent PVCs     Generalized weakness     GERD (gastroesophageal reflux disease)     Heart attack     History of coronary artery disease     with remote history of bypass surgery in 2001    Hyperlipidemia     Hypertension     Hypertensive encephalopathy     Mental status change     Acute    Nausea & vomiting  12/01/2023    NO DEVICE/RECALLED     Pleural effusion     Pneumonia     Poor historian     RBBB (right bundle branch block)     Stroke     POOR VISION    TIA (transient ischemic attack)     Type 2 diabetes mellitus     Urinary retention     Vitamin D deficiency      Past Surgical History:   Procedure Laterality Date    APPENDECTOMY N/A 02/14/2016    Dr. Alexey Dodson    ARTERIOVENOUS FISTULA/SHUNT SURGERY Right 06/03/2022    Procedure: RIGHT FOREARM ARTERIOVENOUS FISTULA PLACEMENT RADIOCEPHALIC WITH CEPHALIC VEIN TRANSPOSITION;  Surgeon: Eliseo Willis MD;  Location: [facility] MAIN OR;  Service: Vascular;  Laterality: Right;    COLONOSCOPY      over 20 years ago.  no polyps     COLONOSCOPY N/A 09/18/2018    Procedure: COLONOSCOPY;  Surgeon: Jose Enrique Louie MD;  Location: [facility] ENDOSCOPY;  Service: Gastroenterology    COLONOSCOPY N/A 09/19/2018    IH, EH, polyps (TAs w/low grade dysplasia)    COLONOSCOPY N/A 06/01/2020    Procedure: COLONOSCOPY;  Surgeon: Jose Enrique Louie MD;  Location: [facility] ENDOSCOPY;  Service: Gastroenterology;  Laterality: N/A;  Pre op-Anemia, Melena, History of Polyps  Post op-To Cecum/TI, Polyp, Poor Prep    CORONARY ARTERY BYPASS GRAFT  11/2001    ENDOSCOPY N/A 05/29/2020    Procedure: ESOPHAGOGASTRODUODENOSCOPY with biopsies;  Surgeon: Amanda Lovelace MD;  Location: [facility] ENDOSCOPY;  Service: Gastroenterology;  Laterality: N/A;  pre-anemia, dark stools  post-esophagitis, hiatal hernia    ENDOSCOPY N/A 09/15/2020    Procedure: ESOPHAGOGASTRODUODENOSCOPY WITH BIOPSIES;  Surgeon: Jose Enrique Louie MD;  Location: [facility] ENDOSCOPY;  Service: Gastroenterology;  Laterality: N/A;  pre: history of melena and esophagitis  post: mild esophagitis and gastritis, small hiatal hernia    ENDOSCOPY N/A 12/4/2023    Procedure: ESOPHAGOGASTRODUODENOSCOPY with bx;  Surgeon: Quoc Elmore MD;  Location: [facility] ENDOSCOPY;  Service: Gastroenterology;  Laterality: N/A;  pre: Coffee-ground  emesis  post: hiatal hernia, esophagitis    HERNIA REPAIR Left 12/05/2019    THORACENTESIS      TONSILLECTOMY      VASECTOMY        General Information       Row Name 01/11/24 1108          Physical Therapy Time and Intention    Document Type therapy note (daily note)  -LW     Mode of Treatment physical therapy  -       Row Name 01/11/24 1108          General Information    Patient Profile Reviewed yes  -LW     Existing Precautions/Restrictions fall  -LW       Row Name 01/11/24 1108          Cognition    Orientation Status (Cognition) oriented x 3  -LW       Row Name 01/11/24 1108          Safety Issues, Functional Mobility    Impairments Affecting Function (Mobility) strength;balance;cognition;endurance/activity tolerance;shortness of breath  -LW               User Key  (r) = Recorded By, (t) = Taken By, (c) = Cosigned By      Initials Name Provider Type    Yesenia Krueger PT Physical Therapist                   Mobility       Row Name 01/11/24 1111          Bed Mobility    Bed Mobility supine-sit  -LW     Supine-Sit [place] (Bed Mobility) standby assist  -LW     Assistive Device (Bed Mobility) head of bed elevated  -       Row Name 01/11/24 1111          Sit-Stand Transfer    Sit-Stand [place] (Transfers) contact guard  -LW     Assistive Device (Sit-Stand Transfers) walker, front-wheeled  -LW       Row Name 01/11/24 1111          Gait/Stairs (Locomotion)    [place] Level (Gait) contact guard  -LW     Assistive Device (Gait) walker, front-wheeled  -LW     Distance in Feet (Gait) 80'  -LW     Deviations/Abnormal Patterns (Gait) festinating/shuffling;gait speed decreased  -LW     Bilateral Gait Deviations forward flexed posture  -LW               User Key  (r) = Recorded By, (t) = Taken By, (c) = Cosigned By      Initials Name Provider Type    Yesenia Krueger PT Physical Therapist                   Obj/Interventions       Row Name 01/11/24 1112          Motor Skills    Therapeutic Exercise other  (see comments)  AP, LAQ, seated march x10  -LW       Row Name 01/11/24 1112          Balance    Balance Assessment sitting static balance;sitting dynamic balance;standing static balance;standing dynamic balance  -LW     Static Sitting Balance standby assist  -LW     Dynamic Sitting Balance standby assist  -LW     Position, Sitting Balance sitting edge of bed;unsupported  -LW     Static Standing Balance contact guard  -LW     Dynamic Standing Balance contact guard  -LW     Position/Device Used, Standing Balance walker, front-wheeled  -LW     Balance Interventions sitting;standing;sit to stand  -LW               User Key  (r) = Recorded By, (t) = Taken By, (c) = Cosigned By      Initials Name Provider Type    Yesenia Krueger, PT Physical Therapist                   Goals/Plan    No documentation.                  Clinical Impression       Row Name 01/11/24 1113          Pain    Pretreatment Pain Rating 0/10 - no pain  -LW     Posttreatment Pain Rating 0/10 - no pain  -LW       Row Name 01/11/24 1113          Plan of Care Review    Plan of Care Reviewed With patient  -LW     Progress improving  -LW     Outcome Evaluation Pt agreeable to working with PT. He was SBA for bed mobility and stood CGA with rwx. He ambualted 80' with rwx and CGA. His gait was steady, but fatigued quickly. Patient continues to exhibit deficits in activity tolerance and generalized weakness. Pt will benefit from continued skilled PT addresing limitaitons in functional mobiltiy to maximize safety and independence.  -LW       Row Name 01/11/24 1113          Positioning and Restraints    Pre-Treatment Position in bed  -LW     Post Treatment Position chair  -LW     In Chair reclined;call light within reach;encouraged to call for assist;exit alarm on;notified nsg  -LW               User Key  (r) = Recorded By, (t) = Taken By, (c) = Cosigned By      Initials Name Provider Type    Yesenia Krueger, PT Physical Therapist                   Outcome  Measures       Row Name 01/11/24 1118          How much help from another person do you currently need...    Turning from your back to your side while in flat bed without using bedrails? 4  -LW     Moving from lying on back to sitting on the side of a flat bed without bedrails? 4  -LW     Moving to and from a bed to a chair (including a wheelchair)? 3  -LW     Standing up from a chair using your arms (e.g., wheelchair, bedside chair)? 3  -LW     Climbing 3-5 steps with a railing? 2  -LW     To walk in hospital room? 3  -LW     AM-PAC 6 Clicks Score (PT) 19  -LW     Highest Level of Mobility Goal 6 --> Walk 10 steps or more  -LW               User Key  (r) = Recorded By, (t) = Taken By, (c) = Cosigned By      Initials Name Provider Type    Yesenia Krueger, PT Physical Therapist                                 Physical Therapy Education       Title: PT OT SLP Therapies (Done)       Topic: Physical Therapy (Done)       Point: Mobility training (Done)       Learning Progress Summary             Patient Acceptance, E, VU by  at 1/11/2024 1118    Acceptance, E,D, DU,NR by PC at 1/10/2024 1157    Acceptance, E,TB, VU by  at 1/7/2024 0827    Acceptance, E, NR by AR at 1/5/2024 1537                         Point: Home exercise program (Done)       Learning Progress Summary             Patient Acceptance, E, VU by LW at 1/11/2024 1118    Acceptance, E,D, DU,NR by PC at 1/10/2024 1157    Acceptance, E,TB, VU by  at 1/7/2024 0827    Acceptance, E, NR by AR at 1/5/2024 1537                         Point: Body mechanics (Done)       Learning Progress Summary             Patient Acceptance, E, VU by LW at 1/11/2024 1118    Acceptance, E,D, DU,NR by PC at 1/10/2024 1157    Acceptance, E,TB, VU by  at 1/7/2024 0827    Acceptance, E, NR by AR at 1/5/2024 1537                         Point: Precautions (Done)       Learning Progress Summary             Patient Acceptance, E, VU by  at 1/11/2024 1118    Acceptance, E,D, DU,NR  by  at 1/10/2024 1157    Acceptance, E,TB, VU by  at 1/7/2024 0827    Acceptance, E, NR by AR at 1/5/2024 1537                                         User Key       Initials Effective Dates Name Provider Type Discipline    PC 06/16/21 -  Denise Espinal, PT Physical Therapist PT    AR 06/16/21 -  Kellen Archibald, DENISSE Physical Therapist PT     06/16/21 -  Marianne Jonas, RN Registered Nurse Nurse     05/08/23 -  Yesenia Youssef, PT Physical Therapist PT                  PT Recommendation and Plan     Plan of Care Reviewed With: patient  Progress: improving  Outcome Evaluation: Pt agreeable to working with PT. He was SBA for bed mobility and stood CGA with rwx. He ambualted 80' with rwx and CGA. His gait was steady, but fatigued quickly. Patient continues to exhibit deficits in activity tolerance and generalized weakness. Pt will benefit from continued skilled PT addresing limitaitons in functional mobiltiy to maximize safety and independence.     Time Calculation:         PT Charges       Row Name 01/11/24 1118             Time Calculation    Start Time 0856  -LW      Stop Time 0910  -LW      Time Calculation (min) 14 min  -LW      PT Received On 01/11/24  -LW      PT - Next Appointment 01/12/24  -LW         Time Calculation- PT    Total Timed Code Minutes- PT 14 minute(s)  -LW         Timed Charges    56179 - PT Therapeutic Exercise Minutes 4  -LW      04478 - PT Therapeutic Activity Minutes 10  -LW         Total Minutes    Timed Charges Total Minutes 14  -LW       Total Minutes 14  -LW                User Key  (r) = Recorded By, (t) = Taken By, (c) = Cosigned By      Initials Name Provider Type    LW Yesenia Youssef PT Physical Therapist                  Therapy Charges for Today       Code Description Service Date Service Provider Modifiers Qty    48501252822 HC PT THERAPEUTIC ACT EA 15 MIN 1/11/2024 Yesenia Youssef, PT GP 1            PT G-Codes  Outcome Measure Options: AM-PAC 6 Clicks Basic Mobility  (PT)  AM-[facility] 6 Clicks Score (PT): 19       Yesenia Youssef, PT  1/11/2024

## 2024-01-11 NOTE — PLAN OF CARE
Goal Outcome Evaluation:  Plan of Care Reviewed With: patient        Progress: improving  Outcome Evaluation: Pt agreeable to working with PT. He was SBA for bed mobility and stood CGA with rwx. He ambualted 80' with rwx and CGA. His gait was steady, but fatigued quickly. Patient continues to exhibit deficits in activity tolerance and generalized weakness. Pt will benefit from continued skilled PT addresing limitaitons in functional mobiltiy to maximize safety and independence.

## 2024-01-11 NOTE — DISCHARGE SUMMARY
Patient Name: Carlito Mo  : 1955  MRN: [id number]    Date of Admission: 1/3/2024  Date of Discharge:  2024  Primary Care Physician: Florencia Caballero MD      Chief Complaint:   Syncope and Shortness of Breath      Discharge Diagnoses     Active Hospital Problems    Diagnosis  POA    **Syncope [R55]  Yes    Abnormal CT of the chest [R93.89]  Yes    ESRD (end stage renal disease) [N18.6]  Yes    History of stroke [Z86.73]  Not Applicable    Anemia, chronic disease [D63.8]  Yes    Essential hypertension [I10]  Yes    Coronary artery disease involving native coronary artery of native heart without angina pectoris [I25.10]  Yes    Chronic combined systolic and diastolic CHF (congestive heart failure) [I50.42]  Yes    Acquired hypothyroidism [E03.9]  Yes    Type 2 diabetes mellitus, with long-term current use of insulin [E11.9, Z79.4]  Not Applicable      Resolved Hospital Problems   No resolved problems to display.        Hospital Course     Mr. Mo is a 68 y.o. male with a history of COPD, diabetes mellitus type 2, hypertension, hyperlipidemia, GERD, chronic combined systolic and diastolic congestive heart failure, ESRD who presented to [facility] initially complaining of syncope and shortness of breath.  Please see the admitting history and physical for further details.  He was found to have orthostatic hypotension and was admitted to the hospital for further evaluation and treatment.      Patient developed syncopal episode after dialysis most likely due to orthostatic hypotension.  Nephrology manage fluid resuscitation and increase his dry weight.  Cardiology followed patient as well and did not recommend Zio patch at discharge given anticipated low yield.  Coreg initially reduced and later discontinued by nephrologist as well as tamsulosin to avoid recurrent hypotension.  Patient denies urinary retention thereafter.    Abnormal CT chest with tree-in-bud nodularities findings favored  to be related to chronic atypical infection.  Follow-up primary care provider continue monitoring patient tolerating oxygen saturation 98% on room air.    Initial constipation resolved following soapsuds enema.    Patient tolerating intake and minimal physical activity with assistance--appears medically stable for discharge to skilled nursing facility on 1/11/2024 & agrees with plan for follow-up as previously discussed.    Day of Discharge     Physical Exam:  Temp:  [98 °F (36.7 °C)-98.9 °F (37.2 °C)] 98.5 °F (36.9 °C)  Heart Rate:  [55-73] 68  Resp:  [16] 16  BP: (121-154)/(61-82) 121/75  Body mass index is 19.35 kg/m².    Physical Exam  Constitutional:       General: He is not in acute distress.     Appearance: He is not toxic-appearing.      Comments: Generally weak   HENT:      Head: Normocephalic and atraumatic.   Eyes:      Extraocular Movements: Extraocular movements intact.      Conjunctiva/sclera: Conjunctivae normal.   Cardiovascular:      Rate and Rhythm: Normal rate.   Pulmonary:      Effort: Pulmonary effort is normal.   Abdominal:      General: Bowel sounds are normal.      Palpations: Abdomen is soft.   Musculoskeletal:      Cervical back: Normal range of motion and neck supple.      Right lower leg: No edema.      Left lower leg: No edema.   Skin:     General: Skin is warm and dry.   Neurological:      Mental Status: He is alert and oriented to person, place, and time.      Cranial Nerves: No cranial nerve deficit.   Psychiatric:         Behavior: Behavior normal.         Thought Content: Thought content normal.         Consultants     Consult Orders (all) (From admission, onward)       Start     Ordered    01/04/24 0702  Inpatient Nephrology Consult  IN AM        Specialty:  Nephrology  Provider:  Ayad Bangura MD    01/04/24 0523    01/04/24 0702  Inpatient Cardiology Consult  IN AM        Specialty:  Cardiology  Provider:  Alverto Templeton MD    01/04/24 0523 01/04/24 0428  [facility]  (on-call MD unless specified) Details  Once        Specialty:  Hospitalist  Provider:  (Not yet assigned)    01/04/24 0427                  Procedures     * Surgery not found *    Imaging Results (All)       Procedure Component Value Units Date/Time    XR Chest 1 View [[id number]] Collected: 01/04/24 0527     Updated: 01/04/24 0527    Narrative:        Patient: ECTOR SANCHEZ  Time Out: 05:27  Exam(s): XR CXR 1 VIEW     EXAM:    XR Chest, 1 View    CLINICAL HISTORY:     Reason for exam: suboptimal positioning on prior imaging.    TECHNIQUE:    Frontal view of the chest.    COMPARISON:  Single view of the chest January 4, 2023    IMPRESSION:       1.  No acute cardiopulmonary abnormality.  2.  Sternotomy and CABG changes.  3.  Consider cross-sectional imaging if there is further concern.    Impression:          Electronically signed by Eliseo Marcus MD on 01-04-24 at 0527    CT Chest Without Contrast Diagnostic [[id number]] Collected: 01/04/24 0429     Updated: 01/04/24 0429    Narrative:        Patient: ECTOR SANCHEZ  Time Out: 04:28  Exam(s): CT CHEST Without Contrast     EXAM:    CT Chest Without Intravenous Contrast    CLINICAL HISTORY:     Reason for exam: soa.    TECHNIQUE:    Axial computed tomography images of the chest without intravenous   contrast.  CTDI is 6.63 mGy and DLP is 275.6 mGy-cm.  This CT exam was   performed according to the principle of ALARA (As Low As Reasonably   Achievable) by using one or more of the following dose reduction   techniques: automated exposure control, adjustment of the mA and or kV   according to patient size, and or use of iterative reconstruction   technique.    COMPARISON:    No relevant prior studies available.    FINDINGS:    Limitations:  Limited examination the absence of contrast.    Lungs:  Areas of tree-in-bud nodularity noted within the lungs, most   prominently the lower lobes and left upper lobe with all lobes involved.    Findings are favored to be related to  chronic atypical infection.  No   consolidation.    Pleural space:  Unremarkable.  No pneumothorax.  No significant   effusion.    Heart:  Unremarkable.  No cardiomegaly.  No significant pericardial   effusion.  No significant coronary artery calcifications.    Bones joints:  Sternotomy changes.  Degenerative changes in the spine.    No acute fracture.  No dislocation.    Soft tissues:  Unremarkable.    Vasculature:  Atherosclerotic disease.  No thoracic aortic aneurysm.    Lymph nodes:  Unremarkable.  No enlarged lymph nodes.    IMPRESSION:       1.  Limited examination the absence of contrast.  2.  Areas of tree-in-bud nodularity noted within the lungs, most   prominently the lower lobes and left upper lobe with all lobes involved.    Findings are favored to be related to chronic atypical infection.  3.  No other acute findings.  4.  Incidental findings as described.    Impression:          Electronically signed by Eliseo Marcus MD on 01-04-24 at 0428    XR Chest 1 View [[id number]] Collected: 01/04/24 0112     Updated: 01/04/24 0119    Narrative:      Portable chest radiograph     HISTORY: Shortness of air     TECHNIQUE: Single AP portable radiograph of the chest     COMPARISON: Chest radiograph 11/20/2023       Impression:      FINDINGS AND IMPRESSION:  There are median sternotomy wires.     Evaluation is suboptimal due to patient rotation. No pulmonary  consolidation or pleural effusion is seen a lucent band projects through  the right mid to lower lung with lung markings clearly extending beyond  this area. Findings are most suggestive of overlying skin fold. However,  recommend repeat AP chest radiograph with correction of patient rotation  to exclude the small possibility of pneumothorax.     Cardiac silhouette is accentuated by patient rotation..     This report was finalized on 1/4/2024 1:16 AM by Dr. Prabhakar Brito M.D  on Workstation: [id number]       CT Head Without Contrast [[id number]] Collected:  01/04/24 0105     Updated: 01/04/24 0105    Narrative:        Patient: ECTOR SANCHEZ  Time Out: 01:04  Exam(s): CT HEAD Without Contrast     EXAM:    CT Head Without Intravenous Contrast    CLINICAL HISTORY:     Reason for exam: fall, struck head.    TECHNIQUE:    Axial computed tomography images of the head brain without intravenous   contrast.  CTDI is 55.9 mGy and DLP is 1035 mGy-cm.  This CT exam was   performed according to the principle of ALARA (As Low As Reasonably   Achievable) by using one or more of the following dose reduction   techniques: automated exposure control, adjustment of the mA and or kV   according to patient size, and or use of iterative reconstruction   technique.    COMPARISON:    No relevant prior studies available.    FINDINGS:    Brain:  No hemorrhage, herniation, or mass effect.  Chronic   microvascular ischemic changes.    Ventricles:  No hydrocephalus.  Age related cerebral volume loss.    Bones joints:  Unremarkable.    Soft tissues: Benign calcified left scalp lesion at the vertex.    Sinuses:  Right maxillary sinus mucosal retention cyst    Mastoid air cells:  Clear.    IMPRESSION:         No acute hemorrhage, hydrocephalus, or mass effect.      Impression:          Electronically signed by Lucien White MD on 01-04-24 at 0104          Results for orders placed during the hospital encounter of 09/15/22    Duplex Carotid Ultrasound CAR    Interpretation Summary  · Right internal carotid artery mild stenosis.  · Left internal carotid artery mild stenosis.    Results for orders placed during the hospital encounter of 03/06/23    Adult Transthoracic Echo Complete W/ Cont if Necessary Per Protocol    Interpretation Summary    There is akinesis of the mid to distal inferoseptum, distal inferior wall, and inferior apex    Left ventricular ejection fraction appears to be 41 - 45%.    Left ventricular diastolic function is consistent with (grade III w/high LAP) fixed restrictive pattern.    " The right ventricular cavity is mildly dilated. Normal right ventricular systolic function noted.    The left atrial cavity is moderately dilated.    Mild tricuspid valve regurgitation is present.    Calculated right ventricular systolic pressure from tricuspid regurgitation is 59 mmHg.    The inferior vena cava is dilated. IVC inspiratory collapse is absent.    There is no evidence of pericardial effusion.    Pertinent Labs     Results from last 7 days   Lab Units 01/09/24 0305 01/08/24 0317 01/07/24  0552 01/06/24  0312   WBC 10*3/mm3 4.37 5.04 4.60 5.04   HEMOGLOBIN g/dL 9.3* 9.9* 10.4* 9.6*   PLATELETS 10*3/mm3 195 202 264 239     Results from last 7 days   Lab Units 01/11/24  0300 01/10/24  0335 01/09/24  0305 01/08/24  0317   SODIUM mmol/L 136 138 138 134*   POTASSIUM mmol/L 4.7 4.5 4.4 4.1   CHLORIDE mmol/L 103 104 107 102   CO2 mmol/L 22.0 23.9 22.1 22.2   BUN mg/dL 41* 25* 40* 33*   CREATININE mg/dL 3.13* 2.44* 3.32* 2.83*   GLUCOSE mg/dL 114* 77 59* 113*   EGFR mL/min/1.73 20.8* 28.1* 19.4* 23.5*     Results from last 7 days   Lab Units 01/11/24  0300 01/10/24  0335 01/09/24  0305 01/08/24  0317   ALBUMIN g/dL 3.4* 3.3* 3.4* 3.3*     Results from last 7 days   Lab Units 01/11/24  0300 01/10/24  0335 01/09/24  0305 01/08/24  0317   CALCIUM mg/dL 8.6 8.5* 8.5* 8.4*   ALBUMIN g/dL 3.4* 3.3* 3.4* 3.3*   PHOSPHORUS mg/dL 3.3 2.8 3.3 3.1               Invalid input(s): \"LDLCALC\"          Test Results Pending at Discharge       Discharge Details        Discharge Medications        Continue These Medications        Instructions Start Date   apixaban 2.5 MG tablet tablet  Commonly known as: ELIQUIS   2.5 mg, Oral, Every 12 Hours Scheduled      Basaglar Tempo Pen 100 UNIT/ML solution pen-injector  Generic drug: Insulin Glargine w/ Trans Port   2 Units, Subcutaneous, Nightly      budesonide-formoterol 160-4.5 MCG/ACT inhaler  Commonly known as: SYMBICORT   2 puffs, Inhalation, 2 Times Daily - RT      buPROPion XL " 150 MG 24 hr tablet  Commonly known as: WELLBUTRIN XL   150 mg, Oral, Daily      famotidine 10 MG tablet  Commonly known as: PEPCID   10 mg, Oral, 2 Times Daily PRN      ipratropium-albuterol  MCG/ACT inhaler  Commonly known as: COMBIVENT RESPIMAT   1 puff, Inhalation, 4 Times Daily PRN      levothyroxine 75 MCG tablet  Commonly known as: SYNTHROID, LEVOTHROID   75 mcg, Oral, Daily      pantoprazole 40 MG EC tablet  Commonly known as: PROTONIX   40 mg, Oral, 2 Times Daily Before Meals      rosuvastatin 10 MG tablet  Commonly known as: CRESTOR   10 mg, Oral, Daily      sodium bicarbonate 650 MG tablet   650 mg, Oral, 3 Times Daily             Stop These Medications      acetaminophen 325 MG tablet  Commonly known as: TYLENOL     bisacodyl 10 MG suppository  Commonly known as: DULCOLAX     carvedilol 6.25 MG tablet  Commonly known as: COREG     ferrous sulfate 325 (65 FE) MG tablet     ondansetron ODT 4 MG disintegrating tablet  Commonly known as: ZOFRAN-ODT     polyethylene glycol 17 g packet  Commonly known as: MIRALAX     sennosides-docusate 8.6-50 MG per tablet  Commonly known as: PERICOLACE     tamsulosin 0.4 MG capsule 24 hr capsule  Commonly known as: FLOMAX              Allergies   Allergen Reactions    Prozac [Fluoxetine Hcl] Other (See Comments)     shaking       Discharge Disposition:  Skilled Nursing Facility (DC - External)      Discharge Diet:  Diet Order   Procedures    Diet: Cardiac Diets; Healthy Heart (2-3 Na+); Texture: Regular Texture (IDDSI 7); Fluid Consistency: Thin (IDDSI 0)       Discharge Activity:   Activity Instructions       Activity as Tolerated      Driving Restrictions      Type of Restriction: Driving    Driving Restrictions: No Driving Until Next Appointment    Up WIth Assist              CODE STATUS:    Code Status and Medical Interventions:   Ordered at: 01/05/24 0800     Code Status (Patient has no pulse and is not breathing):    CPR (Attempt to Resuscitate)     Medical  Interventions (Patient has pulse or is breathing):    Full Support       Future Appointments   Date Time Provider Department Center   2/1/2024  3:00 PM Kae Bunch APRN [facility]     Additional Instructions for the Follow-ups that You Need to Schedule       Discharge Follow-up with PCP   As directed       Currently Documented PCP:    Florencia Caballero MD    PCP Phone Number:    [phone]     Follow Up Details: Please call to schedule a 1 week or earliest available follow-up appointment PCP; BP monitoring & DM management               Contact information for follow-up providers       Florencia Caballero MD .    Specialty: Internal Medicine  Contact information:  [address] [facility] [address] [phone]               Florencia Caballero MD .    Specialty: Internal Medicine  Why: Please call to schedule a 1 week or earliest available follow-up appointment PCP; BP monitoring & DM management  Contact information:  [address] [facility] [address] [phone]                       Contact information for after-discharge care       Destination       [facility] .    Service: Skilled Nursing  Contact information:  [address] [facility] [address] [phone]                                   Additional Instructions for the Follow-ups that You Need to Schedule       Discharge Follow-up with PCP   As directed       Currently Documented PCP:    Florencia Caballero MD    PCP Phone Number:    [phone]     Follow Up Details: Please call to schedule a 1 week or earliest available follow-up appointment PCP; BP monitoring & DM management            Time Spent on Discharge:  Greater than 30 minutes      JI Sheriff  [facility]  01/11/24  11:31 EST

## 2024-01-11 NOTE — NURSING NOTE
Hd completed. Pt w/o regard for needle safety by bending arm. Somewhat confused. Pt safety maintained. 1.5 liters net uf. Vss. 142/98 hr 73

## 2024-01-11 NOTE — PROGRESS NOTES
[facility] Progress Note      Patient Name: Carlito Mo  : 1955  MRN: [id number]  Primary Care Physician:  Florencia Caballero MD  Date of admission: 1/3/2024    Subjective     Interval History:   Follow-up ESRD.  Getting ready to start dialysis.  His blood pressure was not orthostatic this morning.  Less dizzy when up walking.  Did walk with physical therapy this morning.  Fatigues quickly.    Review of Systems:   As noted above    Objective     Vitals:   Temp:  [98 °F (36.7 °C)-98.9 °F (37.2 °C)] 98.5 °F (36.9 °C)  Heart Rate:  [55-73] 68  Resp:  [16] 16  BP: (121-154)/(61-82) 121/75    Intake/Output Summary (Last 24 hours) at 2024 1226  Last data filed at 2024 1200  Gross per 24 hour   Intake 960 ml   Output 2100 ml   Net -1140 ml       Physical Exam:    General Appearance: Chronically ill.  Sitting up in bed.  Conversant.  Skin: warm and dry  HEENT: Oral mucosa moist.  nonicteric sclera  Neck: supple, no JVD  Lungs clear to auscultation bilaterally  Heart:RRR no s3 or rub  Abdomen: soft, nontender, nondistended.+bs   Extremities: no edema.  Right upper extremity AV fistula patent.    Scheduled Meds:     apixaban, 2.5 mg, Oral, Q12H  budesonide-formoterol, 2 puff, Inhalation, BID - RT  buPROPion XL, 150 mg, Oral, Daily  insulin glargine, 2 Units, Subcutaneous, Daily  insulin lispro, 2-7 Units, Subcutaneous, 4x Daily AC & at Bedtime  levothyroxine, 75 mcg, Oral, Daily  pantoprazole, 40 mg, Oral, BID AC  rosuvastatin, 10 mg, Oral, Daily  sodium chloride, 10 mL, Intravenous, Q12H      IV Meds:        Results Reviewed:   I have personally reviewed the results from the time of this admission to 2024 12:26 EST     Results from last 7 days   Lab Units 24  0300 01/10/24  0335 24  0305   SODIUM mmol/L 136 138 138   POTASSIUM mmol/L 4.7 4.5 4.4   CHLORIDE mmol/L 103 104 107   CO2 mmol/L 22.0 23.9 22.1   BUN mg/dL 41* 25* 40*   CREATININE mg/dL 3.13* 2.44*  "3.32*   CALCIUM mg/dL 8.6 8.5* 8.5*   GLUCOSE mg/dL 114* 77 59*       Estimated Creatinine Clearance: 20.7 mL/min (A) (by C-G formula based on SCr of 3.13 mg/dL (H)).    Results from last 7 days   Lab Units 01/11/24  0300 01/10/24  0335 01/09/24  0305   PHOSPHORUS mg/dL 3.3 2.8 3.3             Results from last 7 days   Lab Units 01/09/24  0305 01/08/24  0317 01/07/24  0552 01/06/24  0312 01/05/24  0303   WBC 10*3/mm3 4.37 5.04 4.60 5.04 5.30   HEMOGLOBIN g/dL 9.3* 9.9* 10.4* 9.6* 9.2*   PLATELETS 10*3/mm3 195 202 264 239 245               Assessment / Plan     ESRD.  Outpatient dialysis Tuesday, Thursday, Saturday.  Dialysis today prior to discharge.  Syncope, thought secondary to orthostasis.  Flomax and Coreg now stopped.  Symptoms getting better.  Blood pressure not orthostatic this morning.  Paroxysmal atrial fibrillation  Chronic combined heart failure.  Variable ejection fraction most recent 40-45% on echo 3/8/2023.  Also with Fixed restrictive pattern, but no \"starry james\" appearance.  Dw Dr. Corona.  This may represent long standing hypertensive heart disease.    5.   Anemia of CKD.  6.   DM2  7.  Hypothyroid on replacement.  PLAN:  Hemodialysis today prior to discharge.    Thank you for involving us in the care of Carlito MEHTA Chepe.  Please feel free to call with any questions.    Zahida Hsu MD  01/11/24  12:26 [facility]    [facility]  [phone]    Please note that portions of this note were completed with a voice recognition program.  "

## 2024-01-11 NOTE — PLAN OF CARE
Goal Outcome Evaluation:  Plan of Care Reviewed With: patient        Progress: no change  Outcome Evaluation: Pt anticipating discharge on today to SNF for rehab. Pt has walker at bedside and uses with staff assistance. Spouse is aware that she is responsible for transportation of Pt. No acute complaints at this time. Will continue to monitor

## 2024-01-12 NOTE — CASE MANAGEMENT/SOCIAL WORK
Case Management Discharge Note      Final Note: Pt discharged to [facility] w/ HD chair ready for Saturday.................Ame BERNAL/ASIA         Selected Continued Care - Discharged on 1/11/2024 Admission date: 1/3/2024 - Discharge disposition: Skilled Nursing Facility (DC - External)      Destination Coordination complete.      Service Provider Selected Services Address Phone Fax Patient Preferred    [facility] [address] [place] [id number] [phone] --              Durable Medical Equipment    No services have been selected for the patient.                Dialysis/Infusion    No services have been selected for the patient.                Home Medical Care    No services have been selected for the patient.                Therapy    No services have been selected for the patient.                Community Resources    No services have been selected for the patient.                Community & DME    No services have been selected for the patient.                    Selected Continued Care - Prior Encounters Includes continued care and service providers with selected services from prior encounters from 10/5/2023 to 1/11/2024      Discharged on 12/11/2023 Admission date: 11/28/2023 - Discharge disposition: Skilled Nursing Facility (DC - External)      Destination       Service Provider Selected Services Address Phone Fax Patient Preferred    [facility] [address] [place] [id number] [phone] --                               Final Discharge Disposition Code: 03 - skilled nursing facility (SNF)

## 2024-02-03 NOTE — PLAN OF CARE
Patient Seen in: [facility] In [place]      History     Chief Complaint   Patient presents with    Sore Throat    Ear Pain     Stated Complaint: bilat ear pain and sore throat    Subjective:   HPI    Ashley is a 14-year-old female who presents today for evaluation of bilateral ear pain.  She is accompanied by her mother.  They deny past medical history.  Patient has had URI symptoms for the past few days, 2 days ago developed bilateral ear pain.  She states that the left ear feels like she is underwater and the right ear is now ringing.  Both are very painful and kept her up overnight last night.  She denies otorrhea, fever, frequent swimming, dizziness, nausea and vomiting.    Objective:   Past Medical History:   Diagnosis Date    Premature baby               History reviewed. No pertinent surgical history.             No pertinent social history.            Review of Systems    Positive for stated complaint: bilat ear pain and sore throat  Other systems are as noted in HPI.  Constitutional and vital signs reviewed.      All other systems reviewed and negative except as noted above.    Physical Exam     ED Triage Vitals [02/03/24 0845]   /75   Pulse 101   Resp 16   Temp 98.7 °F (37.1 °C)   Temp src Temporal   SpO2 99 %   O2 Device None (Room air)       Current:/75   Pulse 101   Temp 98.7 °F (37.1 °C) (Temporal)   Resp 16   Ht 162.6 cm (5' 4\")   Wt 55 kg   SpO2 99%   BMI 20.81 kg/m²         Physical Exam  Constitutional: Oriented to person, place, and time. Patient appears well-developed and well-nourished, non-toxic and in no acute distress.  Head: Normocephalic and atraumatic.   Eyes: PERRLA, EOMI, no periorbital edema, anicteric, normal conjunctiva  Ears: External: Non-tender, normal in appearance; canals clear; bilateral TMs are bulging, erythematous and dull with loss of landmarks appreciated.  Nose: Bilateral nasal turbinates congested with clear discharge noted, sinuses  Patient   NPO  for  Colonoscopy  this  am.   Drinking     Miralex  this  am.   Stool  getting  clear.      Consent  and  education  in  the  chart.   Orthostatics  done.  SR  on  monitor.  Nursing  will  continue to monitor.  Problem: Patient Care Overview  Goal: Plan of Care Review  Outcome: Ongoing (interventions implemented as appropriate)  Flowsheets (Taken 6/1/2020 0659)  Progress: no change  Plan of Care Reviewed With: patient     Problem: Renal Failure/Kidney Injury, Acute (Adult)  Goal: Signs and Symptoms of Listed Potential Problems Will be Absent, Minimized or Managed (Renal Failure/Kidney Injury, Acute)  Outcome: Ongoing (interventions implemented as appropriate)  Flowsheets  Taken 5/30/2020 0411 by Bethany Paige RN  Problems Assessed (Acute Renal Failure/Kidney Injury): all  Taken 6/1/2020 0659 by Tobin Morales RN  Problems Present (ARF/Kidney Injury): situational response     Problem: Pain, Acute (Adult)  Goal: Acceptable Pain Control/Comfort Level  Outcome: Ongoing (interventions implemented as appropriate)  Flowsheets (Taken 5/31/2020 0530 by Contreras Martin, RN)  Acceptable Pain Control/Comfort Level: making progress toward outcome      non-tender to palpation.  Mouth/Throat: MMM, no oral lesions, tongue normal in size, post pharynx with mild erythema and clear postnasal drip.  Normal tonsils without exudate or abscess, uvula midline.  Neck: trachea midline, no cervical LAD, full AROM of neck without pain.   Cardiovascular: Normal rate, regular rhythm, normal heart sounds and intact distal pulses.    Pulmonary/Chest: Effort normal and breath sounds normal. Lungs CTA.  Neurological: CN III - XII grossly intact, normal strength and sensation.   Skin: Skin is warm and dry. No rash noted. No erythema.    ED Course   Labs Reviewed - No data to display        Patient and her mother are informed my physical exam findings.  I discussed the sometimes viral etiology of otitis media, but given the appearance of her tympanic membranes bilaterally, that she cannot be reevaluated, and her pain is inhibiting sleep plan to treat with amoxicillin.  Further supportive management is discussed.  Return precautions given.       MDM                                         Medical Decision Making  Patient is a 14-year-old female presents with her mother for evaluation of bilateral ear pain.  Viral URI symptoms preceding the pain.  Differential diagnosis includes, but is not limited to serous otitis media, suppurative otitis media, otitis externa, mastoiditis, and other potentially life-threatening processes.  Patient is otherwise healthy and has no tenderness to palpation of the mastoids bilaterally, making mastoiditis less likely.  Both tympanic membranes are erythematous, dull and bulging, making suppurative otitis media most likely diagnosis.  Will be treated with antibiotics, although viral etiology is discussed with the patient and her mother.  Further supportive management discussed.  Return precautions given.    Amount and/or Complexity of Data Reviewed  Independent Historian: parent    Risk  OTC drugs.  Prescription drug management.        Disposition and Plan      Clinical Impression:  1. Non-recurrent acute suppurative otitis media of both ears without spontaneous rupture of tympanic membranes         Disposition:  Discharge  2/3/2024  9:17 am    Follow-up:  Evelio Al    Follow up  As needed, If symptoms worsen          Medications Prescribed:  Current Discharge Medication List        START taking these medications    Details   amoxicillin 875 MG Oral Tab Take 1 tablet (875 mg total) by mouth 2 (two) times daily for 10 days.  Qty: 20 tablet, Refills: 0

## 2024-02-27 ENCOUNTER — TELEPHONE (OUTPATIENT)
Dept: CARDIOLOGY | Facility: CLINIC | Age: 69
End: 2024-02-27
Payer: MEDICARE

## 2024-02-27 NOTE — TELEPHONE ENCOUNTER
[facility] called the office today requesting a refill on pt's script for apixaban 2.5 mg BID.  They also requested a refill for pt's sodium bicarb, but I provided them with pt's nephrologist contact for this refill.    Let me know if you'd like to refill this.    LOV: 7/2023  NOV: Not scheduled    Thank you,    Jacquelin DANG RN  Triage LC  02/27/24 09:22 EST

## 2024-03-05 DIAGNOSIS — N18.4 CHRONIC KIDNEY DISEASE, STAGE IV (SEVERE): Primary | ICD-10-CM

## 2024-04-10 PROBLEM — Z99.2 HEMODIALYSIS PATIENT: Status: ACTIVE | Noted: 2024-04-10

## 2024-04-16 ENCOUNTER — OFFICE VISIT (OUTPATIENT)
Age: 69
End: 2024-04-16
Payer: MEDICARE

## 2024-04-16 ENCOUNTER — HOSPITAL ENCOUNTER (OUTPATIENT)
Facility: HOSPITAL | Age: 69
Discharge: HOME | End: 2024-04-16
Admitting: NURSE PRACTITIONER
Payer: MEDICARE

## 2024-04-16 VITALS
DIASTOLIC BLOOD PRESSURE: 72 MMHG | BODY MASS INDEX: 19.23 KG/M2 | HEIGHT: 72 IN | HEART RATE: 65 BPM | SYSTOLIC BLOOD PRESSURE: 152 MMHG | WEIGHT: 142 LBS

## 2024-04-16 DIAGNOSIS — N18.4 CHRONIC KIDNEY DISEASE, STAGE IV (SEVERE): ICD-10-CM

## 2024-04-16 DIAGNOSIS — N18.6 ESRD (END STAGE RENAL DISEASE): Primary | ICD-10-CM

## 2024-04-16 PROCEDURE — 93990 DOPPLER FLOW TESTING: CPT

## 2024-04-16 NOTE — PROGRESS NOTES
Chief Complaint  Hemodialysis Access    Subjective        Carlito Mo presents to [facility]  HPI   Carlito Mo is a 68 y.o. male that has been followed in our office for hemodialysis access. He has the following access in place: Right radiocephalic AV fistula placed in 2022. His last intervention was 11/2/2023 where he had coil embolization of stealing branches.  There was an additional branch that Dr. Esquivel wanted to pursue, the patient was unable to tolerate the procedure any further. He returns today in follow up along with an AV duplex. He denies any issues with his access including difficulty with cannulation, prolonged bleeding, decreased clearances, or flow rates.     Review of Systems   Constitutional:  Negative for fever.   Eyes:  Negative for visual disturbance.   Cardiovascular:  Negative for leg swelling.   Gastrointestinal:  Negative for abdominal pain.   Musculoskeletal:  Negative for back pain.   Skin:  Negative for color change, pallor and wound.   Neurological:  Negative for dizziness, facial asymmetry, speech difficulty and weakness.        Carlito Mo  reports that he has never smoked. He has never used smokeless tobacco..        Objective   Vital Signs:  Vitals:    04/16/24 0936   BP: 152/72   Pulse: 65      Body mass index is 19.26 kg/m².       Physical Exam  Vitals reviewed.   Constitutional:       Appearance: Normal appearance.   HENT:      Head: Normocephalic.   Cardiovascular:      Rate and Rhythm: Normal rate and regular rhythm.      Pulses: Normal pulses.           Dorsalis pedis pulses are 3+ on the right side and 3+ on the left side.        Posterior tibial pulses are 3+ on the right side and 3+ on the left side.      Arteriovenous access: Right arteriovenous access is present.  Pulmonary:      Effort: Pulmonary effort is normal.   Skin:     General: Skin is warm.   Neurological:      General: No focal deficit present.      Mental Status: He is  alert and oriented to person, place, and time.   Psychiatric:         Mood and Affect: Mood normal.          Result Review :        Duplex from today:    Arterial inflow is 432 cc/min.  Volume flow is 561 cc/min.  Stenosis seen at the anastomosis with a diameter of 0.28 cm.                 Assessment and Plan     Diagnoses and all orders for this visit:    1. ESRD (end stage renal disease) (Primary)  Assessment & Plan:  2022: Right radiocephalic AV fistula    11/2/2023: Fistulogram with coil embolization of stealing branches    Orders:  -     Duplex Hemodialysis Access CAR; Future          Patient is doing well with no issues with his hemodialysis access.  I have recommended a 3 month follow-up along with a repeat AV fistula duplex.  Should he  have any problems prior to this, he  is to contact our office.       Follow Up     Return in about 3 months (around 7/16/2024) for AVF duplex.  Patient was given instructions and counseling regarding his condition or for health maintenance advice. Please see specific information pulled into the AVS if appropriate.     JI Marquez

## 2024-04-16 NOTE — ASSESSMENT & PLAN NOTE
2022: Right radiocephalic AV fistula    11/2/2023: Fistulogram with coil embolization of stealing branches

## 2024-04-17 LAB
BH CV VAS DIALYSIS ARTERIAL ANASTOMOSIS DIAMETER: 0.28 CM
BH CV VAS DIALYSIS ARTERIAL ANASTOMOSIS EDV: 232 CM/SEC
BH CV VAS DIALYSIS ARTERIAL ANASTOMOSIS PSV: 468 CM/SEC
BH CV VAS DIALYSIS CONDUIT DIST DEPTH: 0.24 CM
BH CV VAS DIALYSIS CONDUIT DIST DIAMETER: 0.41 CM
BH CV VAS DIALYSIS CONDUIT DIST EDV: 65 CM/SEC
BH CV VAS DIALYSIS CONDUIT DIST PSV: 110 CM/SEC
BH CV VAS DIALYSIS CONDUIT MID DEPTH: 0.46 CM
BH CV VAS DIALYSIS CONDUIT MID DIAMETER: 0.67 CM
BH CV VAS DIALYSIS CONDUIT MID EDV: 47 CM/SEC
BH CV VAS DIALYSIS CONDUIT MID PSV: 80 CM/SEC
BH CV VAS DIALYSIS CONDUIT MID/DIST DEPTH: 0.37 CM
BH CV VAS DIALYSIS CONDUIT MID/DIST DIAMETER: 0.58 CM
BH CV VAS DIALYSIS CONDUIT MID/DIST EDV: 50 CM/SEC
BH CV VAS DIALYSIS CONDUIT MID/DIST FLOW VOL: 465 ML/MIN
BH CV VAS DIALYSIS CONDUIT MID/DIST PSV: 85 CM/SEC
BH CV VAS DIALYSIS CONDUIT PROX DIAMETER: 0.56 CM
BH CV VAS DIALYSIS CONDUIT PROX EDV: 218 CM/SEC
BH CV VAS DIALYSIS CONDUIT PROX PSV: 457 CM/SEC
BH CV VAS DIALYSIS CONDUIT PROX/MID DEPTH: 0.3 CM
BH CV VAS DIALYSIS CONDUIT PROX/MID DIAMETER: 0.64 CM
BH CV VAS DIALYSIS CONDUIT PROX/MID EDV: 50 CM/SEC
BH CV VAS DIALYSIS CONDUIT PROX/MID FLOW VOL: 561 ML/MIN
BH CV VAS DIALYSIS CONDUIT PROX/MID PSV: 104 CM/SEC
BH CV VAS DIALYSIS PRE-INFLOW RADIAL DIAMETER: 0.3 CM
BH CV VAS DIALYSIS PRE-INFLOW RADIAL EDV: 161 CM/SEC
BH CV VAS DIALYSIS PRE-INFLOW RADIAL FLOW VOL: 432 ML/MIN
BH CV VAS DIALYSIS PRE-INFLOW RADIAL PSV: 309 CM/SEC
BH CV VAS DIALYSIS VENOUS OUTFLOW AXILLARY DIAMETER: 0.56 CM
BH CV VAS DIALYSIS VENOUS OUTFLOW AXILLARY EDV: 34 CM/SEC
BH CV VAS DIALYSIS VENOUS OUTFLOW AXILLARY PSV: 65 CM/SEC
BH CV VAS DIALYSIS VENOUS OUTFLOW BASILIC VEIN DIAMETER: 0.4 CM
BH CV VAS DIALYSIS VENOUS OUTFLOW BASILIC VEIN EDV: 65 CM/SEC
BH CV VAS DIALYSIS VENOUS OUTFLOW BASILIC VEIN PSV: 108 CM/SEC
BH CV VAS DIALYSIS VENOUS OUTFLOW CEPHALIC VEIN DIAMETE: 0.37 CM
BH CV VAS DIALYSIS VENOUS OUTFLOW CEPHALIC VEIN EDV: 28 CM/SEC
BH CV VAS DIALYSIS VENOUS OUTFLOW CEPHALIC VEIN PSV: 57 CM/SEC
BH CV VAS DIALYSIS VENOUS OUTFLOW SUBCLAVIAN DIAMETER: 0.81 CM
BH CV VAS DIALYSIS VENOUS OUTFLOW SUBCLAVIAN EDV: 39 CM/SEC
BH CV VAS DIALYSIS VENOUS OUTFLOW SUBCLAVIAN PSV: 66 CM/SEC

## 2024-05-23 ENCOUNTER — HOSPITAL ENCOUNTER (OUTPATIENT)
Facility: HOSPITAL | Age: 69
Setting detail: OBSERVATION
LOS: 1 days | Discharge: NURSING, REHAB OR LONG-TERM CARE | End: 2024-05-29
Attending: EMERGENCY MEDICINE | Admitting: INTERNAL MEDICINE
Payer: COMMERCIAL

## 2024-05-23 ENCOUNTER — APPOINTMENT (OUTPATIENT)
Dept: GENERAL RADIOLOGY | Facility: HOSPITAL | Age: 69
End: 2024-05-23
Payer: MEDICARE

## 2024-05-23 DIAGNOSIS — N18.6 ESRD (END STAGE RENAL DISEASE) ON DIALYSIS: ICD-10-CM

## 2024-05-23 DIAGNOSIS — R06.09 EXERTIONAL DYSPNEA: ICD-10-CM

## 2024-05-23 DIAGNOSIS — R53.1 GENERALIZED WEAKNESS: Primary | ICD-10-CM

## 2024-05-23 DIAGNOSIS — Z99.2 ESRD (END STAGE RENAL DISEASE) ON DIALYSIS: ICD-10-CM

## 2024-05-23 PROBLEM — N17.9 AKI (ACUTE KIDNEY INJURY): Status: RESOLVED | Noted: 2022-09-16 | Resolved: 2024-05-23

## 2024-05-23 PROBLEM — R11.2 NAUSEA & VOMITING: Status: RESOLVED | Noted: 2023-12-01 | Resolved: 2024-05-23

## 2024-05-23 PROBLEM — N18.4 CKD (CHRONIC KIDNEY DISEASE) STAGE 4, GFR 15-29 ML/MIN: Status: RESOLVED | Noted: 2021-12-16 | Resolved: 2024-05-23

## 2024-05-23 PROBLEM — R29.6 MULTIPLE FALLS: Status: RESOLVED | Noted: 2022-09-15 | Resolved: 2024-05-23

## 2024-05-23 PROBLEM — E43 SEVERE MALNUTRITION: Status: RESOLVED | Noted: 2023-12-09 | Resolved: 2024-05-23

## 2024-05-23 PROBLEM — I50.41 ACUTE COMBINED SYSTOLIC AND DIASTOLIC CONGESTIVE HEART FAILURE: Status: RESOLVED | Noted: 2023-05-02 | Resolved: 2024-05-23

## 2024-05-23 PROBLEM — R33.9 URINARY RETENTION: Status: RESOLVED | Noted: 2018-09-20 | Resolved: 2024-05-23

## 2024-05-23 PROBLEM — R41.3 MEMORY LOSS DUE TO MEDICAL CONDITION: Status: RESOLVED | Noted: 2022-02-08 | Resolved: 2024-05-23

## 2024-05-23 PROBLEM — I16.0 HYPERTENSIVE URGENCY: Status: RESOLVED | Noted: 2018-02-16 | Resolved: 2024-05-23

## 2024-05-23 PROBLEM — T45.4X5A ADVERSE EFFECT OF IRON AND ITS COMPOUNDS, INITIAL ENCOUNTER: Status: RESOLVED | Noted: 2020-05-23 | Resolved: 2024-05-23

## 2024-05-23 PROBLEM — G47.34 SLEEP RELATED HYPOXIA: Status: RESOLVED | Noted: 2020-07-23 | Resolved: 2024-05-23

## 2024-05-23 PROBLEM — Z09 HOSPITAL DISCHARGE FOLLOW-UP: Status: RESOLVED | Noted: 2019-05-31 | Resolved: 2024-05-23

## 2024-05-23 PROBLEM — M25.511 ACUTE PAIN OF RIGHT SHOULDER: Status: RESOLVED | Noted: 2020-05-27 | Resolved: 2024-05-23

## 2024-05-23 PROBLEM — R10.30 LOWER ABDOMINAL PAIN: Status: RESOLVED | Noted: 2021-12-13 | Resolved: 2024-05-23

## 2024-05-23 PROBLEM — I48.91 ATRIAL FIBRILLATION WITH RAPID VENTRICULAR RESPONSE: Status: RESOLVED | Noted: 2023-11-29 | Resolved: 2024-05-23

## 2024-05-23 PROBLEM — R19.5 HEME POSITIVE STOOL: Status: RESOLVED | Noted: 2020-05-25 | Resolved: 2024-05-23

## 2024-05-23 PROBLEM — R55 SYNCOPE: Status: RESOLVED | Noted: 2024-01-04 | Resolved: 2024-05-23

## 2024-05-23 PROBLEM — E86.0 DEHYDRATION: Status: RESOLVED | Noted: 2022-09-16 | Resolved: 2024-05-23

## 2024-05-23 PROBLEM — R53.81 DEBILITY: Status: RESOLVED | Noted: 2020-05-25 | Resolved: 2024-05-23

## 2024-05-23 PROBLEM — I50.42 CHRONIC COMBINED SYSTOLIC AND DIASTOLIC CHF (CONGESTIVE HEART FAILURE): Status: RESOLVED | Noted: 2018-09-21 | Resolved: 2024-05-23

## 2024-05-23 PROBLEM — I10 HYPERTENSION NOT AT GOAL: Status: RESOLVED | Noted: 2022-02-02 | Resolved: 2024-05-23

## 2024-05-23 PROBLEM — J18.9 PNEUMONIA: Status: RESOLVED | Noted: 2018-09-21 | Resolved: 2024-05-23

## 2024-05-23 PROBLEM — J90 PLEURAL EFFUSION: Status: RESOLVED | Noted: 2018-12-01 | Resolved: 2024-05-23

## 2024-05-23 PROBLEM — K92.0 COFFEE GROUND EMESIS: Status: RESOLVED | Noted: 2023-11-28 | Resolved: 2024-05-23

## 2024-05-23 PROBLEM — Z91.148 NONCOMPLIANCE W/MEDICATION TREATMENT DUE TO INTERMIT USE OF MEDICATION: Status: RESOLVED | Noted: 2018-05-04 | Resolved: 2024-05-23

## 2024-05-23 PROBLEM — J90 PLEURAL EFFUSION, BILATERAL: Status: RESOLVED | Noted: 2021-05-22 | Resolved: 2024-05-23

## 2024-05-23 PROBLEM — J96.01 ACUTE RESPIRATORY FAILURE WITH HYPOXIA: Status: RESOLVED | Noted: 2018-09-21 | Resolved: 2024-05-23

## 2024-05-23 PROBLEM — R93.89 ABNORMAL CT OF THE CHEST: Status: RESOLVED | Noted: 2024-01-04 | Resolved: 2024-05-23

## 2024-05-23 LAB
ALBUMIN SERPL-MCNC: 4.7 G/DL (ref 3.5–5.2)
ALBUMIN/GLOB SERPL: 1.7 G/DL
ALP SERPL-CCNC: 101 U/L (ref 39–117)
ALT SERPL W P-5'-P-CCNC: 11 U/L (ref 1–41)
ANION GAP SERPL CALCULATED.3IONS-SCNC: 12 MMOL/L (ref 5–15)
ANION GAP SERPL CALCULATED.3IONS-SCNC: 13.1 MMOL/L (ref 5–15)
APTT PPP: 27.5 SECONDS (ref 22.7–35.4)
AST SERPL-CCNC: 11 U/L (ref 1–40)
B PARAPERT DNA SPEC QL NAA+PROBE: NOT DETECTED
B PERT DNA SPEC QL NAA+PROBE: NOT DETECTED
BASOPHILS # BLD AUTO: 0.01 10*3/MM3 (ref 0–0.2)
BASOPHILS # BLD MANUAL: 0.12 10*3/MM3 (ref 0–0.2)
BASOPHILS NFR BLD AUTO: 0.1 % (ref 0–1.5)
BASOPHILS NFR BLD MANUAL: 2 % (ref 0–1.5)
BILIRUB SERPL-MCNC: 0.2 MG/DL (ref 0–1.2)
BUN SERPL-MCNC: 31 MG/DL (ref 8–23)
BUN SERPL-MCNC: 33 MG/DL (ref 8–23)
BUN/CREAT SERPL: 12.5 (ref 7–25)
BUN/CREAT SERPL: 13.1 (ref 7–25)
C PNEUM DNA NPH QL NAA+NON-PROBE: NOT DETECTED
CALCIUM SPEC-SCNC: 9 MG/DL (ref 8.6–10.5)
CALCIUM SPEC-SCNC: 9.3 MG/DL (ref 8.6–10.5)
CHLORIDE SERPL-SCNC: 96 MMOL/L (ref 98–107)
CHLORIDE SERPL-SCNC: 99 MMOL/L (ref 98–107)
CO2 SERPL-SCNC: 25.9 MMOL/L (ref 22–29)
CO2 SERPL-SCNC: 30 MMOL/L (ref 22–29)
CREAT SERPL-MCNC: 2.36 MG/DL (ref 0.76–1.27)
CREAT SERPL-MCNC: 2.63 MG/DL (ref 0.76–1.27)
D-LACTATE SERPL-SCNC: 1.1 MMOL/L (ref 0.5–2)
DEPRECATED RDW RBC AUTO: 43.1 FL (ref 37–54)
DEPRECATED RDW RBC AUTO: 44.1 FL (ref 37–54)
EGFRCR SERPLBLD CKD-EPI 2021: 25.7 ML/MIN/1.73
EGFRCR SERPLBLD CKD-EPI 2021: 29.3 ML/MIN/1.73
EOSINOPHIL # BLD AUTO: 0.08 10*3/MM3 (ref 0–0.4)
EOSINOPHIL # BLD MANUAL: 0 10*3/MM3 (ref 0–0.4)
EOSINOPHIL NFR BLD AUTO: 1.1 % (ref 0.3–6.2)
EOSINOPHIL NFR BLD MANUAL: 0 % (ref 0.3–6.2)
ERYTHROCYTE [DISTWIDTH] IN BLOOD BY AUTOMATED COUNT: 13.4 % (ref 12.3–15.4)
ERYTHROCYTE [DISTWIDTH] IN BLOOD BY AUTOMATED COUNT: 13.4 % (ref 12.3–15.4)
FLUAV SUBTYP SPEC NAA+PROBE: NOT DETECTED
FLUBV RNA ISLT QL NAA+PROBE: NOT DETECTED
GEN 5 2HR TROPONIN T REFLEX: 99 NG/L
GLOBULIN UR ELPH-MCNC: 2.8 GM/DL
GLUCOSE BLDC GLUCOMTR-MCNC: 135 MG/DL (ref 70–130)
GLUCOSE BLDC GLUCOMTR-MCNC: 168 MG/DL (ref 70–130)
GLUCOSE BLDC GLUCOMTR-MCNC: 245 MG/DL (ref 70–130)
GLUCOSE BLDC GLUCOMTR-MCNC: 72 MG/DL (ref 70–130)
GLUCOSE SERPL-MCNC: 134 MG/DL (ref 65–99)
GLUCOSE SERPL-MCNC: 203 MG/DL (ref 65–99)
HADV DNA SPEC NAA+PROBE: NOT DETECTED
HCOV 229E RNA SPEC QL NAA+PROBE: NOT DETECTED
HCOV HKU1 RNA SPEC QL NAA+PROBE: NOT DETECTED
HCOV NL63 RNA SPEC QL NAA+PROBE: NOT DETECTED
HCOV OC43 RNA SPEC QL NAA+PROBE: NOT DETECTED
HCT VFR BLD AUTO: 33.9 % (ref 37.5–51)
HCT VFR BLD AUTO: 37.7 % (ref 37.5–51)
HGB BLD-MCNC: 10.8 G/DL (ref 13–17.7)
HGB BLD-MCNC: 11.9 G/DL (ref 13–17.7)
HMPV RNA NPH QL NAA+NON-PROBE: NOT DETECTED
HPIV1 RNA ISLT QL NAA+PROBE: NOT DETECTED
HPIV2 RNA SPEC QL NAA+PROBE: NOT DETECTED
HPIV3 RNA NPH QL NAA+PROBE: NOT DETECTED
HPIV4 P GENE NPH QL NAA+PROBE: NOT DETECTED
IMM GRANULOCYTES # BLD AUTO: 0.03 10*3/MM3 (ref 0–0.05)
IMM GRANULOCYTES NFR BLD AUTO: 0.4 % (ref 0–0.5)
INR PPP: 1.19 (ref 0.9–1.1)
LYMPHOCYTES # BLD AUTO: 0.58 10*3/MM3 (ref 0.7–3.1)
LYMPHOCYTES # BLD MANUAL: 0.79 10*3/MM3 (ref 0.7–3.1)
LYMPHOCYTES NFR BLD AUTO: 7.9 % (ref 19.6–45.3)
LYMPHOCYTES NFR BLD MANUAL: 3 % (ref 5–12)
M PNEUMO IGG SER IA-ACNC: NOT DETECTED
MCH RBC QN AUTO: 28.2 PG (ref 26.6–33)
MCH RBC QN AUTO: 28.6 PG (ref 26.6–33)
MCHC RBC AUTO-ENTMCNC: 31.6 G/DL (ref 31.5–35.7)
MCHC RBC AUTO-ENTMCNC: 31.9 G/DL (ref 31.5–35.7)
MCV RBC AUTO: 88.5 FL (ref 79–97)
MCV RBC AUTO: 90.6 FL (ref 79–97)
MONOCYTES # BLD AUTO: 0.49 10*3/MM3 (ref 0.1–0.9)
MONOCYTES # BLD: 0.18 10*3/MM3 (ref 0.1–0.9)
MONOCYTES NFR BLD AUTO: 6.6 % (ref 5–12)
NEUTROPHILS # BLD AUTO: 4.95 10*3/MM3 (ref 1.7–7)
NEUTROPHILS NFR BLD AUTO: 6.18 10*3/MM3 (ref 1.7–7)
NEUTROPHILS NFR BLD AUTO: 83.9 % (ref 42.7–76)
NEUTROPHILS NFR BLD MANUAL: 81.8 % (ref 42.7–76)
NRBC BLD AUTO-RTO: 0 /100 WBC (ref 0–0.2)
NT-PROBNP SERPL-MCNC: 9034 PG/ML (ref 0–900)
PLAT MORPH BLD: NORMAL
PLATELET # BLD AUTO: 223 10*3/MM3 (ref 140–450)
PLATELET # BLD AUTO: 242 10*3/MM3 (ref 140–450)
PMV BLD AUTO: 10.1 FL (ref 6–12)
PMV BLD AUTO: 9.8 FL (ref 6–12)
POTASSIUM SERPL-SCNC: 4.3 MMOL/L (ref 3.5–5.2)
POTASSIUM SERPL-SCNC: 4.6 MMOL/L (ref 3.5–5.2)
PROCALCITONIN SERPL-MCNC: 0.13 NG/ML (ref 0–0.25)
PROT SERPL-MCNC: 7.5 G/DL (ref 6–8.5)
PROTHROMBIN TIME: 15.4 SECONDS (ref 11.7–14.2)
QT INTERVAL: 490 MS
QTC INTERVAL: 518 MS
RBC # BLD AUTO: 3.83 10*6/MM3 (ref 4.14–5.8)
RBC # BLD AUTO: 4.16 10*6/MM3 (ref 4.14–5.8)
RBC MORPH BLD: NORMAL
RHINOVIRUS RNA SPEC NAA+PROBE: NOT DETECTED
RSV RNA NPH QL NAA+NON-PROBE: NOT DETECTED
SARS-COV-2 RNA NPH QL NAA+NON-PROBE: NOT DETECTED
SODIUM SERPL-SCNC: 138 MMOL/L (ref 136–145)
SODIUM SERPL-SCNC: 138 MMOL/L (ref 136–145)
T4 FREE SERPL-MCNC: 1.47 NG/DL (ref 0.93–1.7)
TROPONIN T DELTA: -7 NG/L
TROPONIN T SERPL HS-MCNC: 106 NG/L
TSH SERPL DL<=0.05 MIU/L-ACNC: 4.37 UIU/ML (ref 0.27–4.2)
VARIANT LYMPHS NFR BLD MANUAL: 13.1 % (ref 19.6–45.3)
WBC MORPH BLD: NORMAL
WBC NRBC COR # BLD AUTO: 6.05 10*3/MM3 (ref 3.4–10.8)
WBC NRBC COR # BLD AUTO: 7.37 10*3/MM3 (ref 3.4–10.8)

## 2024-05-23 PROCEDURE — 80053 COMPREHEN METABOLIC PANEL: CPT | Performed by: EMERGENCY MEDICINE

## 2024-05-23 PROCEDURE — 84439 ASSAY OF FREE THYROXINE: CPT | Performed by: STUDENT IN AN ORGANIZED HEALTH CARE EDUCATION/TRAINING PROGRAM

## 2024-05-23 PROCEDURE — 84484 ASSAY OF TROPONIN QUANT: CPT | Performed by: EMERGENCY MEDICINE

## 2024-05-23 PROCEDURE — 99214 OFFICE O/P EST MOD 30 MIN: CPT | Performed by: INTERNAL MEDICINE

## 2024-05-23 PROCEDURE — 85007 BL SMEAR W/DIFF WBC COUNT: CPT | Performed by: NURSE PRACTITIONER

## 2024-05-23 PROCEDURE — 85027 COMPLETE CBC AUTOMATED: CPT | Performed by: NURSE PRACTITIONER

## 2024-05-23 PROCEDURE — 83605 ASSAY OF LACTIC ACID: CPT | Performed by: EMERGENCY MEDICINE

## 2024-05-23 PROCEDURE — 83880 ASSAY OF NATRIURETIC PEPTIDE: CPT | Performed by: EMERGENCY MEDICINE

## 2024-05-23 PROCEDURE — G0378 HOSPITAL OBSERVATION PER HR: HCPCS

## 2024-05-23 PROCEDURE — 85610 PROTHROMBIN TIME: CPT | Performed by: EMERGENCY MEDICINE

## 2024-05-23 PROCEDURE — 71045 X-RAY EXAM CHEST 1 VIEW: CPT

## 2024-05-23 PROCEDURE — 36415 COLL VENOUS BLD VENIPUNCTURE: CPT | Performed by: NURSE PRACTITIONER

## 2024-05-23 PROCEDURE — 99285 EMERGENCY DEPT VISIT HI MDM: CPT

## 2024-05-23 PROCEDURE — 82948 REAGENT STRIP/BLOOD GLUCOSE: CPT

## 2024-05-23 PROCEDURE — 0202U NFCT DS 22 TRGT SARS-COV-2: CPT | Performed by: EMERGENCY MEDICINE

## 2024-05-23 PROCEDURE — 85025 COMPLETE CBC W/AUTO DIFF WBC: CPT | Performed by: EMERGENCY MEDICINE

## 2024-05-23 PROCEDURE — 93005 ELECTROCARDIOGRAM TRACING: CPT | Performed by: EMERGENCY MEDICINE

## 2024-05-23 PROCEDURE — 36415 COLL VENOUS BLD VENIPUNCTURE: CPT

## 2024-05-23 PROCEDURE — 93010 ELECTROCARDIOGRAM REPORT: CPT | Performed by: INTERNAL MEDICINE

## 2024-05-23 PROCEDURE — 97530 THERAPEUTIC ACTIVITIES: CPT

## 2024-05-23 PROCEDURE — 85730 THROMBOPLASTIN TIME PARTIAL: CPT | Performed by: EMERGENCY MEDICINE

## 2024-05-23 PROCEDURE — 63710000001 INSULIN LISPRO (HUMAN) PER 5 UNITS: Performed by: NURSE PRACTITIONER

## 2024-05-23 PROCEDURE — 84443 ASSAY THYROID STIM HORMONE: CPT | Performed by: STUDENT IN AN ORGANIZED HEALTH CARE EDUCATION/TRAINING PROGRAM

## 2024-05-23 PROCEDURE — 84145 PROCALCITONIN (PCT): CPT | Performed by: EMERGENCY MEDICINE

## 2024-05-23 PROCEDURE — 97162 PT EVAL MOD COMPLEX 30 MIN: CPT

## 2024-05-23 RX ORDER — SODIUM CHLORIDE 0.9 % (FLUSH) 0.9 %
10 SYRINGE (ML) INJECTION EVERY 12 HOURS SCHEDULED
Status: DISCONTINUED | OUTPATIENT
Start: 2024-05-23 | End: 2024-05-29 | Stop reason: HOSPADM

## 2024-05-23 RX ORDER — ACETAMINOPHEN 160 MG/5ML
650 SOLUTION ORAL EVERY 4 HOURS PRN
Status: DISCONTINUED | OUTPATIENT
Start: 2024-05-23 | End: 2024-05-29 | Stop reason: HOSPADM

## 2024-05-23 RX ORDER — NICOTINE POLACRILEX 4 MG
15 LOZENGE BUCCAL
Status: DISCONTINUED | OUTPATIENT
Start: 2024-05-23 | End: 2024-05-29 | Stop reason: HOSPADM

## 2024-05-23 RX ORDER — ACETAMINOPHEN 325 MG/1
650 TABLET ORAL EVERY 4 HOURS PRN
Status: DISCONTINUED | OUTPATIENT
Start: 2024-05-23 | End: 2024-05-29 | Stop reason: HOSPADM

## 2024-05-23 RX ORDER — PANTOPRAZOLE SODIUM 40 MG/1
40 TABLET, DELAYED RELEASE ORAL
Status: DISCONTINUED | OUTPATIENT
Start: 2024-05-23 | End: 2024-05-29 | Stop reason: HOSPADM

## 2024-05-23 RX ORDER — MELATONIN
5000 DAILY
Status: DISCONTINUED | OUTPATIENT
Start: 2024-05-23 | End: 2024-05-29 | Stop reason: HOSPADM

## 2024-05-23 RX ORDER — POLYETHYLENE GLYCOL 3350 17 G/17G
17 POWDER, FOR SOLUTION ORAL DAILY PRN
Status: DISCONTINUED | OUTPATIENT
Start: 2024-05-23 | End: 2024-05-29 | Stop reason: HOSPADM

## 2024-05-23 RX ORDER — SODIUM CHLORIDE 0.9 % (FLUSH) 0.9 %
10 SYRINGE (ML) INJECTION AS NEEDED
Status: DISCONTINUED | OUTPATIENT
Start: 2024-05-23 | End: 2024-05-29 | Stop reason: HOSPADM

## 2024-05-23 RX ORDER — SODIUM CHLORIDE 9 MG/ML
40 INJECTION, SOLUTION INTRAVENOUS AS NEEDED
Status: DISCONTINUED | OUTPATIENT
Start: 2024-05-23 | End: 2024-05-29 | Stop reason: HOSPADM

## 2024-05-23 RX ORDER — SODIUM BICARBONATE 650 MG/1
650 TABLET ORAL 3 TIMES DAILY
Status: DISCONTINUED | OUTPATIENT
Start: 2024-05-23 | End: 2024-05-24

## 2024-05-23 RX ORDER — CARVEDILOL 3.12 MG/1
3.12 TABLET ORAL 2 TIMES DAILY
Status: DISCONTINUED | OUTPATIENT
Start: 2024-05-23 | End: 2024-05-23

## 2024-05-23 RX ORDER — IBUPROFEN 600 MG/1
1 TABLET ORAL
Status: DISCONTINUED | OUTPATIENT
Start: 2024-05-23 | End: 2024-05-29 | Stop reason: HOSPADM

## 2024-05-23 RX ORDER — BISACODYL 10 MG
10 SUPPOSITORY, RECTAL RECTAL DAILY PRN
Status: DISCONTINUED | OUTPATIENT
Start: 2024-05-23 | End: 2024-05-29 | Stop reason: HOSPADM

## 2024-05-23 RX ORDER — DEXTROSE MONOHYDRATE 25 G/50ML
25 INJECTION, SOLUTION INTRAVENOUS
Status: DISCONTINUED | OUTPATIENT
Start: 2024-05-23 | End: 2024-05-29 | Stop reason: HOSPADM

## 2024-05-23 RX ORDER — BUPROPION HYDROCHLORIDE 150 MG/1
150 TABLET ORAL DAILY
Status: DISCONTINUED | OUTPATIENT
Start: 2024-05-23 | End: 2024-05-29 | Stop reason: HOSPADM

## 2024-05-23 RX ORDER — INSULIN LISPRO 100 [IU]/ML
2-7 INJECTION, SOLUTION INTRAVENOUS; SUBCUTANEOUS
Status: DISCONTINUED | OUTPATIENT
Start: 2024-05-23 | End: 2024-05-29 | Stop reason: HOSPADM

## 2024-05-23 RX ORDER — AMOXICILLIN 250 MG
2 CAPSULE ORAL 2 TIMES DAILY PRN
Status: DISCONTINUED | OUTPATIENT
Start: 2024-05-23 | End: 2024-05-29 | Stop reason: HOSPADM

## 2024-05-23 RX ORDER — ACETAMINOPHEN 650 MG/1
650 SUPPOSITORY RECTAL EVERY 4 HOURS PRN
Status: DISCONTINUED | OUTPATIENT
Start: 2024-05-23 | End: 2024-05-29 | Stop reason: HOSPADM

## 2024-05-23 RX ORDER — ROSUVASTATIN CALCIUM 10 MG/1
10 TABLET, COATED ORAL DAILY
Status: DISCONTINUED | OUTPATIENT
Start: 2024-05-23 | End: 2024-05-29 | Stop reason: HOSPADM

## 2024-05-23 RX ORDER — ONDANSETRON 2 MG/ML
4 INJECTION INTRAMUSCULAR; INTRAVENOUS EVERY 6 HOURS PRN
Status: DISCONTINUED | OUTPATIENT
Start: 2024-05-23 | End: 2024-05-29 | Stop reason: HOSPADM

## 2024-05-23 RX ORDER — BISACODYL 5 MG/1
5 TABLET, DELAYED RELEASE ORAL DAILY PRN
Status: DISCONTINUED | OUTPATIENT
Start: 2024-05-23 | End: 2024-05-29 | Stop reason: HOSPADM

## 2024-05-23 RX ORDER — ASPIRIN 81 MG/1
81 TABLET ORAL DAILY
Status: DISCONTINUED | OUTPATIENT
Start: 2024-05-23 | End: 2024-05-29 | Stop reason: HOSPADM

## 2024-05-23 RX ORDER — LEVOTHYROXINE SODIUM 0.07 MG/1
75 TABLET ORAL DAILY
Status: DISCONTINUED | OUTPATIENT
Start: 2024-05-23 | End: 2024-05-29 | Stop reason: HOSPADM

## 2024-05-23 RX ADMIN — SODIUM BICARBONATE 650 MG: 650 TABLET ORAL at 10:07

## 2024-05-23 RX ADMIN — SODIUM BICARBONATE 650 MG: 650 TABLET ORAL at 22:28

## 2024-05-23 RX ADMIN — ASPIRIN 81 MG: 81 TABLET, COATED ORAL at 19:17

## 2024-05-23 RX ADMIN — LEVOTHYROXINE SODIUM 75 MCG: 75 TABLET ORAL at 10:07

## 2024-05-23 RX ADMIN — APIXABAN 2.5 MG: 2.5 TABLET, FILM COATED ORAL at 22:28

## 2024-05-23 RX ADMIN — SODIUM BICARBONATE 650 MG: 650 TABLET ORAL at 19:16

## 2024-05-23 RX ADMIN — PANTOPRAZOLE SODIUM 40 MG: 40 TABLET, DELAYED RELEASE ORAL at 10:07

## 2024-05-23 RX ADMIN — Medication 10 ML: at 10:08

## 2024-05-23 RX ADMIN — Medication 10 ML: at 22:28

## 2024-05-23 RX ADMIN — CARVEDILOL 3.12 MG: 3.12 TABLET, FILM COATED ORAL at 10:07

## 2024-05-23 RX ADMIN — PANTOPRAZOLE SODIUM 40 MG: 40 TABLET, DELAYED RELEASE ORAL at 19:19

## 2024-05-23 RX ADMIN — APIXABAN 2.5 MG: 2.5 TABLET, FILM COATED ORAL at 10:07

## 2024-05-23 RX ADMIN — ROSUVASTATIN CALCIUM 10 MG: 10 TABLET, FILM COATED ORAL at 10:07

## 2024-05-23 RX ADMIN — BUPROPION HYDROCHLORIDE 150 MG: 150 TABLET, EXTENDED RELEASE ORAL at 10:07

## 2024-05-23 RX ADMIN — Medication 5000 UNITS: at 10:07

## 2024-05-23 RX ADMIN — INSULIN LISPRO 2 UNITS: 100 INJECTION, SOLUTION INTRAVENOUS; SUBCUTANEOUS at 19:17

## 2024-05-23 NOTE — PLAN OF CARE
Goal Outcome Evaluation:  Plan of Care Reviewed With: patient           Outcome Evaluation: Pt is a 67 yo male who presents from home with generalized weakness, ESRD on HD. Prior to admission, pt was living at home with spouse and independent with mobility using cane. Upon exam, pt demonstrates generalized weakness, impaired balance, and decreased endurance limiting mobility. Pt performed bed mobility with CGA and stood with CGA. Pt ambulated 30' with CGA and rwx; gait distance limited by dizziness and fatigue. Pt will continue to benefit from PT to address impairments and increase independence with mobility. Rec DC to SNF pending progress with PT.      Anticipated Discharge Disposition (PT): skilled nursing facility

## 2024-05-23 NOTE — ED PROVIDER NOTES
EMERGENCY DEPARTMENT ENCOUNTER    Room Number:  14/14  PCP: Florencia Caballero MD  Historian: Patient      HPI:  Chief Complaint: Weakness/shortness of breath  A complete HPI/ROS/PMH/PSH/SH/FH are unobtainable due to: None  Context: Carlito Mo is a 68 y.o. male who presents to the ED c/o sudden onset of generalized weakness as well as more exertional shortness of breath that began shortly prior to ED arrival today.  He reports that he missed his morning dialysis but went this evening.  When he arrived home, he was unable to ascend the stairs of his home due to weakness and shortness of breath.  Symptoms are currently moderate in intensity.  He denies chest pain, cough, nausea/vomiting, fever/chills, or abdominal pain.            PAST MEDICAL HISTORY  Active Ambulatory Problems     Diagnosis Date Noted    Major depressive disorder with single episode, in partial remission     Other hyperlipidemia     Type 2 diabetes mellitus, with long-term current use of insulin     Vitamin D deficiency 12/17/2015    Erectile dysfunction 12/17/2015    Chronic fatigue 04/25/2016    Type 2 diabetes mellitus with ophthalmic complication 04/25/2016    Skin lesion of chest wall 06/20/2016    Slow transit constipation 09/01/2016    Benign prostatic hyperplasia with nocturia 12/20/2016    History of basal cell carcinoma 12/20/2016    High blood pressure associated with diabetes 12/20/2016    Acquired hypothyroidism 12/20/2017    Hypertensive urgency 02/16/2018    Recurrent strokes 02/20/2018    Noncompliance w/medication treatment due to intermit use of medication 05/04/2018    Urinary retention 09/20/2018    Pneumonia 09/21/2018    Chronic combined systolic and diastolic CHF (congestive heart failure) 09/21/2018    Acute respiratory failure with hypoxia 09/21/2018    Suspected sleep apnea 10/29/2018    Pleural effusion 12/01/2018    YAW (obstructive sleep apnea) 12/13/2018    Coronary artery disease involving native coronary artery of  native heart without angina pectoris 04/18/2019    Chronically elevated troponin 07/02/2019    Colon cancer screening 10/30/2018    Enlarged lymph nodes 10/30/2018    Functional gait abnormality 10/30/2018    History of myocardial infarction 02/06/2019    Hospital discharge follow-up 05/31/2019    Insomnia 02/06/2019    Iron deficiency anemia 10/02/2018    Major depression, recurrent 10/16/2012    Anemia, chronic renal failure, stage 3 (moderate) 02/27/2020    Essential hypertension 03/10/2020    Adverse effect of iron and its compounds, initial encounter 05/23/2020    Acute on chronic renal insufficiency 05/25/2020    Debility 05/25/2020    Falls frequently 05/25/2020    Acute pain of right shoulder 05/27/2020    Acute on chronic anemia 05/25/2020    Heme positive stool 05/25/2020    Neurogenic orthostatic hypotension 05/30/2020    Anemia, chronic disease 05/25/2020    History of colon polyps 05/25/2020    Helicobacter pylori gastritis 06/04/2020    Esophagitis 06/10/2020    Sleep related hypoxia 07/23/2020    Embolic stroke 05/20/2021    Patent foramen ovale 05/22/2021    Pleural effusion, bilateral 05/22/2021    Cardiomyopathy 05/24/2021    Lower abdominal pain 12/13/2021    Diarrhea 12/14/2021    CKD (chronic kidney disease) stage 4, GFR 15-29 ml/min 12/16/2021    Hypertension not at goal 02/02/2022    Memory loss due to medical condition 02/08/2022    Generalized weakness 03/01/2022    ERRONEOUS ENCOUNTER--DISREGARD 03/09/2022    Weakness 06/03/2022    Paroxysmal atrial fibrillation 07/25/2022    Chronic anticoagulation 07/25/2022    Multiple falls 09/15/2022    Dehydration 09/16/2022    SYLVAIN (acute kidney injury) 09/16/2022    Acute ischemic stroke 09/17/2022    Acute combined systolic and diastolic congestive heart failure 05/02/2023    History of stroke 05/02/2023    Iron deficiency anemia 05/04/2023    Acute HFrEF (heart failure with reduced ejection fraction) 05/05/2023    Atrial fibrillation with rapid  ventricular response 11/29/2023    ESRD (end stage renal disease) 12/01/2023    Nausea & vomiting 12/01/2023    Hemoptysis 12/01/2023    Coffee ground emesis 11/28/2023    Severe malnutrition 12/09/2023    Chronic HFrEF (heart failure with reduced ejection fraction) 12/11/2023    Syncope 01/04/2024    Abnormal CT of the chest 01/04/2024    Hemodialysis patient 04/10/2024     Resolved Ambulatory Problems     Diagnosis Date Noted    Anemia     Arthritis     Diabetes mellitus out of control     Acute sinusitis     Accumulation of fluid in tissues 12/17/2015    Fatigue 12/17/2015    Cardiomyopathy, ischemic 12/17/2015    Acute appendicitis 03/02/2016    Atherosclerosis of coronary artery 10/16/2012    Altered mental status 11/30/2017    Hypertensive encephalopathy syndrome 04/30/2018    Hyperglycemia 05/04/2018    Screening for colorectal cancer 08/22/2018    Constipation 08/22/2018    Snoring 12/13/2018    Lower abdominal pain 05/27/2020    Hypertensive emergency 02/01/2022    DE LEON (dyspnea on exertion) 03/07/2023    Shortness of breath 03/08/2023     Past Medical History:   Diagnosis Date    Acute congestive heart failure     CAD (coronary artery disease)     Cancer     Chronic combined systolic and diastolic congestive heart failure     Chronic ischemic heart disease     Chronic kidney disease (CKD) 04/11/2022    CKD (chronic kidney disease)     COPD (chronic obstructive pulmonary disease)     Depression     Diabetes mellitus type 2, uncontrolled, without complications     Diabetic neuropathy 04/11/2022    Disease of thyroid gland     Elevated cholesterol     Frequent PVCs     GERD (gastroesophageal reflux disease)     Heart attack     History of coronary artery disease     Hyperlipidemia     Hypertension     Hypertensive encephalopathy     Mental status change     NO DEVICE/RECALLED     Poor historian     RBBB (right bundle branch block)     Stroke     TIA (transient ischemic attack)     Type 2 diabetes mellitus           PAST SURGICAL HISTORY  Past Surgical History:   Procedure Laterality Date    APPENDECTOMY N/A 02/14/2016    Dr. Alexey Dodson    ARTERIOVENOUS FISTULA/SHUNT SURGERY Right 06/03/2022    Procedure: RIGHT FOREARM ARTERIOVENOUS FISTULA PLACEMENT RADIOCEPHALIC WITH CEPHALIC VEIN TRANSPOSITION;  Surgeon: Eliseo Willis MD;  Location: [facility] MAIN OR;  Service: Vascular;  Laterality: Right;    COLONOSCOPY      over 20 years ago.  no polyps     COLONOSCOPY N/A 09/18/2018    Procedure: COLONOSCOPY;  Surgeon: Jose Enrique Louie MD;  Location: [facility] ENDOSCOPY;  Service: Gastroenterology    COLONOSCOPY N/A 09/19/2018    IH, EH, polyps (TAs w/low grade dysplasia)    COLONOSCOPY N/A 06/01/2020    Procedure: COLONOSCOPY;  Surgeon: Jose Enrique Louie MD;  Location: [facility] ENDOSCOPY;  Service: Gastroenterology;  Laterality: N/A;  Pre op-Anemia, Melena, History of Polyps  Post op-To Cecum/TI, Polyp, Poor Prep    CORONARY ARTERY BYPASS GRAFT  11/2001    ENDOSCOPY N/A 05/29/2020    Procedure: ESOPHAGOGASTRODUODENOSCOPY with biopsies;  Surgeon: Amanda Lovelace MD;  Location: [facility] ENDOSCOPY;  Service: Gastroenterology;  Laterality: N/A;  pre-anemia, dark stools  post-esophagitis, hiatal hernia    ENDOSCOPY N/A 09/15/2020    Procedure: ESOPHAGOGASTRODUODENOSCOPY WITH BIOPSIES;  Surgeon: Jose Enrique Louie MD;  Location: [facility] ENDOSCOPY;  Service: Gastroenterology;  Laterality: N/A;  pre: history of melena and esophagitis  post: mild esophagitis and gastritis, small hiatal hernia    ENDOSCOPY N/A 12/4/2023    Procedure: ESOPHAGOGASTRODUODENOSCOPY with bx;  Surgeon: Quoc Elmore MD;  Location: [facility] ENDOSCOPY;  Service: Gastroenterology;  Laterality: N/A;  pre: Coffee-ground emesis  post: hiatal hernia, esophagitis    HERNIA REPAIR Left 12/05/2019    THORACENTESIS      TONSILLECTOMY      VASECTOMY           FAMILY HISTORY  Family History   Problem Relation Age of Onset    Diabetes Mother     Hypertension  Mother     Macular degeneration Mother     Alcohol abuse Father     Cancer Father         lung,  age 65    Heart disease Father     Alcohol abuse Brother     Cirrhosis Brother          age 50    Liver cancer Brother 45    Diabetes Son     Kidney failure Son     Autism Son     Malig Hyperthermia Neg Hx          SOCIAL HISTORY  Social History     Socioeconomic History    Marital status:      Spouse name: Lissette    Number of children: 3    Years of education: college   Tobacco Use    Smoking status: Never    Smokeless tobacco: Never    Tobacco comments:     CAFFEINE - OCCAS. SODA   Vaping Use    Vaping status: Never Used   Substance and Sexual Activity    Alcohol use: No     Comment: last drink 2 months ago    Drug use: No    Sexual activity: Defer         ALLERGIES  Prozac [fluoxetine hcl]        REVIEW OF SYSTEMS  Review of Systems   Constitutional:  Negative for activity change, appetite change and fever.   HENT:  Negative for congestion and sore throat.    Eyes: Negative.    Respiratory:  Positive for shortness of breath. Negative for cough.    Cardiovascular:  Negative for chest pain and leg swelling.   Gastrointestinal:  Negative for abdominal pain, diarrhea and vomiting.   Endocrine: Negative.    Genitourinary:  Negative for decreased urine volume and dysuria.   Musculoskeletal:  Negative for neck pain.   Skin:  Negative for rash and wound.   Allergic/Immunologic: Negative.    Neurological:  Positive for weakness. Negative for numbness and headaches.   Hematological: Negative.    Psychiatric/Behavioral: Negative.     All other systems reviewed and are negative.         PHYSICAL EXAM  ED Triage Vitals [24 0114]   Temp Heart Rate Resp BP SpO2   -- 61 18 129/66 100 %      Temp src Heart Rate Source Patient Position BP Location FiO2 (%)   -- Monitor Sitting Right arm --       Physical Exam  Constitutional:       General: He is not in acute distress.     Appearance: Normal appearance. He is  not ill-appearing or toxic-appearing.   HENT:      Head: Normocephalic and atraumatic.   Eyes:      Extraocular Movements: Extraocular movements intact.      Pupils: Pupils are equal, round, and reactive to light.   Cardiovascular:      Rate and Rhythm: Normal rate and regular rhythm.      Heart sounds: No murmur heard.     No friction rub. No gallop.   Pulmonary:      Effort: Pulmonary effort is normal.      Breath sounds: Normal breath sounds.   Abdominal:      General: Abdomen is flat. There is no distension.      Palpations: Abdomen is soft.      Tenderness: There is no abdominal tenderness.   Musculoskeletal:         General: No swelling or tenderness. Normal range of motion.      Cervical back: Normal range of motion and neck supple.   Skin:     General: Skin is warm and dry.   Neurological:      General: No focal deficit present.      Mental Status: He is alert and oriented to person, place, and time.      Sensory: No sensory deficit.      Motor: No weakness.   Psychiatric:         Mood and Affect: Mood normal.         Behavior: Behavior normal.           Vital signs and nursing notes reviewed.          LAB RESULTS  Recent Results (from the past 24 hour(s))   ECG 12 Lead Dyspnea    Collection Time: 05/23/24  2:15 AM   Result Value Ref Range    QT Interval 490 ms    QTC Interval 518 ms   Comprehensive Metabolic Panel    Collection Time: 05/23/24  2:49 AM    Specimen: Blood   Result Value Ref Range    Glucose 203 (H) 65 - 99 mg/dL    BUN 31 (H) 8 - 23 mg/dL    Creatinine 2.36 (H) 0.76 - 1.27 mg/dL    Sodium 138 136 - 145 mmol/L    Potassium 4.6 3.5 - 5.2 mmol/L    Chloride 96 (L) 98 - 107 mmol/L    CO2 30.0 (H) 22.0 - 29.0 mmol/L    Calcium 9.3 8.6 - 10.5 mg/dL    Total Protein 7.5 6.0 - 8.5 g/dL    Albumin 4.7 3.5 - 5.2 g/dL    ALT (SGPT) 11 1 - 41 U/L    AST (SGOT) 11 1 - 40 U/L    Alkaline Phosphatase 101 39 - 117 U/L    Total Bilirubin 0.2 0.0 - 1.2 mg/dL    Globulin 2.8 gm/dL    A/G Ratio 1.7 g/dL     BUN/Creatinine Ratio 13.1 7.0 - 25.0    Anion Gap 12.0 5.0 - 15.0 mmol/L    eGFR 29.3 (L) >60.0 mL/min/1.73   Lactic Acid, Plasma    Collection Time: 05/23/24  2:49 AM    Specimen: Blood   Result Value Ref Range    Lactate 1.1 0.5 - 2.0 mmol/L   Procalcitonin    Collection Time: 05/23/24  2:49 AM    Specimen: Blood   Result Value Ref Range    Procalcitonin 0.13 0.00 - 0.25 ng/mL   BNP    Collection Time: 05/23/24  2:49 AM    Specimen: Blood   Result Value Ref Range    proBNP 9,034.0 (H) 0.0 - 900.0 pg/mL   High Sensitivity Troponin T    Collection Time: 05/23/24  2:49 AM    Specimen: Blood   Result Value Ref Range    HS Troponin T 106 (C) <22 ng/L   CBC Auto Differential    Collection Time: 05/23/24  2:49 AM    Specimen: Blood   Result Value Ref Range    WBC 7.37 3.40 - 10.80 10*3/mm3    RBC 4.16 4.14 - 5.80 10*6/mm3    Hemoglobin 11.9 (L) 13.0 - 17.7 g/dL    Hematocrit 37.7 37.5 - 51.0 %    MCV 90.6 79.0 - 97.0 fL    MCH 28.6 26.6 - 33.0 pg    MCHC 31.6 31.5 - 35.7 g/dL    RDW 13.4 12.3 - 15.4 %    RDW-SD 44.1 37.0 - 54.0 fl    MPV 10.1 6.0 - 12.0 fL    Platelets 242 140 - 450 10*3/mm3    Neutrophil % 83.9 (H) 42.7 - 76.0 %    Lymphocyte % 7.9 (L) 19.6 - 45.3 %    Monocyte % 6.6 5.0 - 12.0 %    Eosinophil % 1.1 0.3 - 6.2 %    Basophil % 0.1 0.0 - 1.5 %    Immature Grans % 0.4 0.0 - 0.5 %    Neutrophils, Absolute 6.18 1.70 - 7.00 10*3/mm3    Lymphocytes, Absolute 0.58 (L) 0.70 - 3.10 10*3/mm3    Monocytes, Absolute 0.49 0.10 - 0.90 10*3/mm3    Eosinophils, Absolute 0.08 0.00 - 0.40 10*3/mm3    Basophils, Absolute 0.01 0.00 - 0.20 10*3/mm3    Immature Grans, Absolute 0.03 0.00 - 0.05 10*3/mm3    nRBC 0.0 0.0 - 0.2 /100 WBC   Protime-INR    Collection Time: 05/23/24  2:49 AM    Specimen: Blood   Result Value Ref Range    Protime 15.4 (H) 11.7 - 14.2 Seconds    INR 1.19 (H) 0.90 - 1.10   aPTT    Collection Time: 05/23/24  2:49 AM    Specimen: Blood   Result Value Ref Range    PTT 27.5 22.7 - 35.4 seconds   Respiratory  Panel PCR w/COVID-19(SARS-CoV-2) SUKUMAR/TERRELL/RAISSA/PAD/COR/ARNALDO In-House, NP Swab in UTM/VTM, 2 HR TAT - Swab, Nasopharynx    Collection Time: 05/23/24  2:57 AM    Specimen: Nasopharynx; Swab   Result Value Ref Range    ADENOVIRUS, PCR Not Detected Not Detected    Coronavirus 229E Not Detected Not Detected    Coronavirus HKU1 Not Detected Not Detected    Coronavirus NL63 Not Detected Not Detected    Coronavirus OC43 Not Detected Not Detected    COVID19 Not Detected Not Detected - Ref. Range    Human Metapneumovirus Not Detected Not Detected    Human Rhinovirus/Enterovirus Not Detected Not Detected    Influenza A PCR Not Detected Not Detected    Influenza B PCR Not Detected Not Detected    Parainfluenza Virus 1 Not Detected Not Detected    Parainfluenza Virus 2 Not Detected Not Detected    Parainfluenza Virus 3 Not Detected Not Detected    Parainfluenza Virus 4 Not Detected Not Detected    RSV, PCR Not Detected Not Detected    Bordetella pertussis pcr Not Detected Not Detected    Bordetella parapertussis PCR Not Detected Not Detected    Chlamydophila pneumoniae PCR Not Detected Not Detected    Mycoplasma pneumo by PCR Not Detected Not Detected   High Sensitivity Troponin T 2Hr    Collection Time: 05/23/24  4:48 AM    Specimen: Blood   Result Value Ref Range    HS Troponin T 99 (C) <22 ng/L    Troponin T Delta -7 (L) >=-4 - <+4 ng/L       Ordered the above labs and reviewed the results.        RADIOLOGY  XR Chest 1 View    Result Date: 5/23/2024  Patient: ECTOR SANCHEZ  Time Out: 05:00 Exam(s): XR CXR 1 VIEW EXAM:   XR Chest, 1 View CLINICAL HISTORY:    Reason for exam: soa. TECHNIQUE:   Frontal view of the chest. COMPARISON: Single view of the chest January 4 2024 IMPRESSION: 1.  Unchanged sternotomy changes. 2.  No acute cardiopulmonary abnormality. 2.  Chronic left posterior sixth rib fracture.    Electronically signed by Eliseo Marcus MD on 05-23-24 at 0500     Ordered the above noted radiological studies. Reviewed by  me in PACS.            PROCEDURES  Procedures    EKG independently interpreted by myself as follows:    EKG          EKG time: 0215  Rhythm/Rate: NSR, 67  P waves and MD: nml  QRS, axis: RBBB, nml axis   ST and T waves: nml     Interpreted Contemporaneously by me, independently viewed  unchanged compared to prior 1/5/24            MEDICATIONS GIVEN IN ER  Medications   sodium chloride 0.9 % flush 10 mL (has no administration in time range)                   MEDICAL DECISION MAKING, PROGRESS, and CONSULTS    All labs have been independently reviewed by me.  All radiology studies have been reviewed by me and I have also reviewed the radiology report.   EKG's independently viewed and interpreted by me.  Discussion below represents my analysis of pertinent findings related to patient's condition, differential diagnosis, treatment plan and final disposition.      Additional sources:  - Discussed/ obtained information from independent historians: History obtained from the patient himself at bedside.    - External (non-ED) record review: Upon medical records review, the patient was last seen and evaluated in the outpatient setting by his primary nurse practitioner on 4/29/2024 in follow-up for his known coronary artery disease, type 2 diabetes mellitus, COPD, atrial fibrillation, as well as other chronic medical issues.    - Chronic or social conditions impacting care: Anticoagulation dependent atrial fibrillation, dialysis dependent ESRD    - Shared decision making: Admission decision based on shared conversations have between myself, the patient at bedside, as well as JI Catherine for [facility].      Orders placed during this visit:  Orders Placed This Encounter   Procedures    Respiratory Panel PCR w/COVID-19(SARS-CoV-2) SUKUMAR/TERRELL/RAISSA/PAD/COR/ARNALDO In-House, NP Swab in UTM/VTM, 2 HR TAT - Swab, Nasopharynx    XR Chest 1 View    Comprehensive Metabolic Panel    Urinalysis With Microscopic If Indicated (No Culture) -  Urine, Clean Catch    Lactic Acid, Plasma    Procalcitonin    BNP    High Sensitivity Troponin T    Protime-INR    aPTT    CBC Auto Differential    High Sensitivity Troponin T 2Hr    [facility] (on-call MD unless specified) Details    ECG 12 Lead Dyspnea    Insert Peripheral IV    Inpatient Admission    CBC & Differential           Differential diagnosis includes but is not limited to:    ESRD with volume overload, CHF with pulmonary edema, acute coronary syndrome, acute anemia, sepsis, urinary tract infection, pneumonia, or severe electrolyte disturbance      Independent interpretation of labs, radiology studies, and discussions with consultants:    Chest x-ray was independently interpreted by myself with my interpretation showing no cardiomegaly nor area of edema, infiltrate, or pneumothorax.        ED Course as of 05/23/24 0556   Thu May 23, 2024   0530 On reevaluation, the patient is resting fairly comfortably and without acute complaint.  I informed him that his lab work is essentially as expected for a dialysis patient and EKG and chest x-ray are unremarkable.  Given the fact that he was completely unable to ambulate in his home up the stairs as well as the ongoing shortness of breath, I would like to admit him to the hospital today for further management and treatment.  He agrees with that plan and all questions answered. [BM]   8350 The patient's presentation, workup, as well as diagnosis and treatment plan was discussed at length with JI Catherine for A.  She agrees to admit the patient to the hospital today for Dr. Hogue. [BM]      ED Course User Index  [BM] Sergio Burgess MD         DIAGNOSIS  Final diagnoses:   Generalized weakness   Exertional dyspnea   ESRD (end stage renal disease) on dialysis         DISPOSITION  ADMISSION    Discussed treatment plan and reason for admission with pt/family and admitting physician.  Pt/family voiced understanding of the plan for admission for further  testing/treatment as needed.               Latest Documented Vital Signs:  As of 05:56 EDT  BP- 132/63 HR- 69 Temp- 97.8 °F (36.6 °C) (Oral) O2 sat- 98%              --    Please note that portions of this were completed with a voice recognition program.       Note Disclaimer: At [facility], we believe that sharing information builds trust and better relationships. You are receiving this note because you are receiving care at [facility] or recently visited. It is possible you will see health information before a provider has talked with you about it. This kind of information can be easy to misunderstand. To help you fully understand what it means for your health, we urge you to discuss this note with your provider.             Sergio Burgess MD  05/23/24 2413

## 2024-05-23 NOTE — CONSULTS
[facility] Consult Note      Patient Name: Carlito Mo  : 1955  MRN: [id number]  Primary Care Physician:  Florencia Caballero MD  Referring Physician: Sergio Burgess MD  Date of admission: 2024    Subjective     Reason for Consult: ESRD on HD     HPI:   Carlito Mo is a 68 y.o. male with ESRD on HD (TTS; via right forearm AV fistula; [facility]; Dr. Bryan Huddleston), came in this morning due to increased weakness and SOA on exertion.  Patient overslept on Tuesday () and missed regular a.m. HD, rescheduled to yesterday evening () at a different HD center.  Had a successful treatment without issues around 11 PM, although unable to walk upstairs post-HD due to increased weakness and SOA.  EMS was called and recommended to come to the hospital for further evaluation.     Upon admission, creatinine 2.36, proBNP 9034, troponin 106, satting at 100% on RA, BP within acceptable range.  Chest x-ray suggested no acute findings.  Other pertinent PMH includes: Hypothyroidism, CHF, anemia, CAD, type II DM with neuropathy, MI, HTN, TIA, stroke.    Chronic DE LEON, worse past few weeks; current conversational dyspnea??; although satting 97 to 100% on RA; denies orthopnea  + Dizziness/lightheadedness x 2-3 days  Denies N/V, + loose Bms; good appetite  States normally pulls 4 L during HD, unsure how much was pulled yesterday    Review of Systems:   14 point review of systems is otherwise negative except for mentioned above on HPI    Personal History     Past Medical History:   Diagnosis Date   • Acquired hypothyroidism    • Acute congestive heart failure    • Acute respiratory failure with hypoxia    • Acute sinusitis    • SYLVAIN (acute kidney injury)     on CKD   • Anemia    • Arthritis    • CAD (coronary artery disease)    • Cancer     skin   • Chronic combined systolic and diastolic congestive heart failure    • Chronic ischemic heart disease    • Chronic kidney disease (CKD) 2022     stage 4   • CKD (chronic kidney disease)    • COPD (chronic obstructive pulmonary disease)    • Depression    • Diabetes mellitus type 2, uncontrolled, without complications    • Diabetic neuropathy 04/11/2022    Diabetes mellitus due to underlying condition   • Disease of thyroid gland    • Elevated cholesterol    • Fatigue    • Frequent PVCs    • Generalized weakness    • GERD (gastroesophageal reflux disease)    • Heart attack    • History of coronary artery disease     with remote history of bypass surgery in 2001   • Hyperlipidemia    • Hypertension    • Hypertensive encephalopathy    • Mental status change     Acute   • Nausea & vomiting 12/01/2023   • NO DEVICE/RECALLED    • Pleural effusion    • Pneumonia    • Poor historian    • RBBB (right bundle branch block)    • Stroke     POOR VISION   • TIA (transient ischemic attack)    • Type 2 diabetes mellitus    • Urinary retention    • Vitamin D deficiency        Past Surgical History:   Procedure Laterality Date   • APPENDECTOMY N/A 02/14/2016    Dr. Alexey Dodson   • ARTERIOVENOUS FISTULA/SHUNT SURGERY Right 06/03/2022    Procedure: RIGHT FOREARM ARTERIOVENOUS FISTULA PLACEMENT RADIOCEPHALIC WITH CEPHALIC VEIN TRANSPOSITION;  Surgeon: Eliseo Willis MD;  Location: [facility] MAIN OR;  Service: Vascular;  Laterality: Right;   • COLONOSCOPY      over 20 years ago.  no polyps    • COLONOSCOPY N/A 09/18/2018    Procedure: COLONOSCOPY;  Surgeon: Jose Enrique Louie MD;  Location: [facility] ENDOSCOPY;  Service: Gastroenterology   • COLONOSCOPY N/A 09/19/2018    IH, EH, polyps (TAs w/low grade dysplasia)   • COLONOSCOPY N/A 06/01/2020    Procedure: COLONOSCOPY;  Surgeon: Jose Enrique Louie MD;  Location: [facility] ENDOSCOPY;  Service: Gastroenterology;  Laterality: N/A;  Pre op-Anemia, Melena, History of Polyps  Post op-To Cecum/TI, Polyp, Poor Prep   • CORONARY ARTERY BYPASS GRAFT  11/2001   • ENDOSCOPY N/A 05/29/2020    Procedure: ESOPHAGOGASTRODUODENOSCOPY with  biopsies;  Surgeon: Amanda Lovelace MD;  Location:  [facility] ENDOSCOPY;  Service: Gastroenterology;  Laterality: N/A;  pre-anemia, dark stools  post-esophagitis, hiatal hernia   • ENDOSCOPY N/A 09/15/2020    Procedure: ESOPHAGOGASTRODUODENOSCOPY WITH BIOPSIES;  Surgeon: Jose Enrique Louie MD;  Location:  [facility] ENDOSCOPY;  Service: Gastroenterology;  Laterality: N/A;  pre: history of melena and esophagitis  post: mild esophagitis and gastritis, small hiatal hernia   • ENDOSCOPY N/A 12/4/2023    Procedure: ESOPHAGOGASTRODUODENOSCOPY with bx;  Surgeon: Quoc Elmore MD;  Location:  [facility];  Service: Gastroenterology;  Laterality: N/A;  pre: Coffee-ground emesis  post: hiatal hernia, esophagitis   • HERNIA REPAIR Left 12/05/2019   • THORACENTESIS     • TONSILLECTOMY     • VASECTOMY         Family History: family history includes Alcohol abuse in his brother and father; Autism in his son; Cancer in his father; Cirrhosis in his brother; Diabetes in his mother and son; Heart disease in his father; Hypertension in his mother; Kidney failure in his son; Liver cancer (age of onset: 45) in his brother; Macular degeneration in his mother.    Social History:  reports that he has never smoked. He has never used smokeless tobacco. He reports that he does not drink alcohol and does not use drugs.    Home Medications:  Prior to Admission medications    Medication Sig Start Date End Date Taking? Authorizing Provider   apixaban (ELIQUIS) 2.5 MG tablet tablet Take 1 tablet by mouth Every 12 (Twelve) Hours. Indications: Atrial Fibrillation 2/27/24   Nida Lopes APRN   aspirin 81 MG EC tablet Take 1 tablet by mouth Daily.    Provider, MD Alonzo   budesonide-formoterol (SYMBICORT) 160-4.5 MCG/ACT inhaler Inhale 2 puffs 2 (Two) Times a Day. 5/5/23   Smith Henson MD   buPROPion XL (WELLBUTRIN XL) 150 MG 24 hr tablet Take 1 tablet by mouth Daily.    Provider, MD Alonzo   carvedilol (COREG) 3.125 MG tablet  Take 1 tablet by mouth 2 (Two) Times a Day.    Alonzo Dean MD   famotidine (PEPCID) 10 MG tablet Take 1 tablet by mouth 2 (Two) Times a Day As Needed for Heartburn.    Alonzo Dean MD   insulin glargine (LANTUS, SEMGLEE) 100 UNIT/ML injection 4 units 1 once per day    Alonzo Dean MD   Insulin Glargine w/ Trans Port (Basaglar Tempo Pen) 100 UNIT/ML solution pen-injector Inject 2 Units under the skin into the appropriate area as directed Every Night.    Alonzo Dean MD   INSULIN GLULISINE SC Inject  under the skin into the appropriate area as directed. 4 units- 1 three times per day    Alonzo Dean MD   ipratropium-albuterol (COMBIVENT RESPIMAT)  MCG/ACT inhaler Inhale 1 puff 4 (Four) Times a Day As Needed for Wheezing.    Alonzo Dean MD   levothyroxine (SYNTHROID, LEVOTHROID) 75 MCG tablet Take 1 tablet by mouth Daily. 2/8/22   Erica Welsh MD   pantoprazole (PROTONIX) 40 MG EC tablet Take 1 tablet by mouth 2 (Two) Times a Day Before Meals. 12/11/23   Aria Red MD   rosuvastatin (CRESTOR) 10 MG tablet Take 1 tablet by mouth Daily. 12/12/23   Aria Red MD   sodium bicarbonate 650 MG tablet Take 1 tablet by mouth 3 (Three) Times a Day.  Patient taking differently: Take 1 tablet by mouth 3 (Three) Times a Day. Pt is unsure if he's currently taking 5/5/23   Smith Henson MD   tamsulosin (FLOMAX) 0.4 MG capsule 24 hr capsule Take 1 capsule by mouth Daily.    Alonzo Dean MD   torsemide (DEMADEX) 20 MG tablet Take 1 tablet by mouth Daily.    Alonzo Dean MD   vitamin D3 125 MCG (5000 UT) capsule capsule Take 1 capsule by mouth Daily.    Alonzo Dean MD       Allergies:  Allergies   Allergen Reactions   • Prozac [Fluoxetine Hcl] Other (See Comments)     shaking       Objective     Vitals:   Temp:  [97.3 °F (36.3 °C)-97.8 °F (36.6 °C)] 97.3 °F (36.3 °C)  Heart Rate:  [61-85] 71  Resp:  [18] 18  BP:  (482-144)/(54-76) 144/60    Intake/Output Summary (Last 24 hours) at 5/23/2024 1134  Last data filed at 5/23/2024 0930  Gross per 24 hour   Intake 240 ml   Output 100 ml   Net 140 ml       Physical Exam:   Constitutional: Awake, alert, NAD, chronically ill-appearing.  HEENT: Sclera anicteric, no conjunctival injection  Neck: Supple, no JVD  Respiratory: Diminished to auscultation bilaterally, conversational dyspnea  Cardiovascular: Irregular rhythm, no rub   Gastrointestinal: BS +, soft, nontender and nondistended  : No palpable bladder  Musculoskeletal: No edema, no clubbing or cyanosis  Psychiatric: Flat affect, depressed mood, cooperative, oriented  Neurologic: moving all extremities, slow responses  Skin: Warm and dry       Scheduled Meds:     apixaban, 2.5 mg, Oral, Q12H  aspirin, 81 mg, Oral, Daily  buPROPion XL, 150 mg, Oral, Daily  carvedilol, 3.125 mg, Oral, BID  cholecalciferol, 5,000 Units, Oral, Daily  insulin lispro, 2-7 Units, Subcutaneous, 4x Daily AC & at Bedtime  levothyroxine, 75 mcg, Oral, Daily  pantoprazole, 40 mg, Oral, BID AC  rosuvastatin, 10 mg, Oral, Daily  sodium bicarbonate, 650 mg, Oral, TID  sodium chloride, 10 mL, Intravenous, Q12H      IV Meds:        Results Reviewed:   I have personally reviewed the results from the time of this admission to 5/23/2024 11:34 EDT     Lab Results   Component Value Date    GLUCOSE 134 (H) 05/23/2024    CALCIUM 9.0 05/23/2024     05/23/2024    K 4.3 05/23/2024    CO2 25.9 05/23/2024    CL 99 05/23/2024    BUN 33 (H) 05/23/2024    CREATININE 2.63 (H) 05/23/2024    EGFRIFAFRI 23 (L) 09/27/2022    EGFRIFNONA 19 (L) 02/04/2022    BCR 12.5 05/23/2024    ANIONGAP 13.1 05/23/2024      Lab Results   Component Value Date    MG 2.4 01/03/2024    PHOS 3.3 01/11/2024    ALBUMIN 4.7 05/23/2024           Assessment / Plan       Generalized weakness    Type 2 diabetes mellitus, with long-term current use of insulin    Coronary artery disease involving native  coronary artery of native heart without angina pectoris    Essential hypertension    Paroxysmal atrial fibrillation    History of stroke    ESRD (end stage renal disease)      ASSESSMENT:   ESRD on maintenance HD TTS via right forearm AV fistula.  Had a successful make-up session on 5/22 PM, with subsequent increased weakness and SOA.  No fluid excess on exam or imaging, electrolytes within normal range.   Hypertension with CKD.  BP controlled  Type II diabetes with CKD   Systolic and diastolic CHF/CAD/elevated troponin, echo on 3/8/2023 showed EF 41 to 45% with moderate tricuspid regurg; on admission (5/23) proBNP 9034, trop 106->99  Anemia with chronic kidney disease, receives [facility] outpatient  Hypothyroidism, TSH on 5/23 at 4.37    PLAN:  Okay to be discharged from nephrology standpoint anytime  If still here tomorrow, plan on HD then  Surveillance labs    Thank you for involving us in the care of Carlito JANINE Mo.  Please feel free to call with any questions.    Ramakrishna Blake MD  05/23/24  11:34 EDT    [facility]  [phone]      Please note that portions of this note were completed with a voice recognition program.

## 2024-05-23 NOTE — ED NOTES
Nursing report ED to floor  Carlito Mo  68 y.o.  male    HPI :  HPI (Adult)  Stated Reason for Visit: Pt presents to ED with complaints of increased weakness and increased SOA after dialysis today-pt states he normally goes in the morning but they were unable to see him so he had to go this evening. Pt states he finished dialysis around 2245-pt states he is unsure how much fluid they took off him today.  History Obtained From: patient, EMS    Chief Complaint  Chief Complaint   Patient presents with    Weakness - Generalized    Shortness of Breath       Admitting doctor:   Asim Hogue MD    Admitting diagnosis:   The primary encounter diagnosis was Generalized weakness. Diagnoses of Exertional dyspnea and ESRD (end stage renal disease) on dialysis were also pertinent to this visit.    Code status:   Current Code Status       Date Active Code Status Order ID Comments User Context       Prior            Allergies:   Prozac [fluoxetine hcl]    Isolation:   No active isolations    Intake and Output  No intake or output data in the 24 hours ending 05/23/24 0603    Weight:   There were no vitals filed for this visit.    Most recent vitals:   Vitals:    05/23/24 0301 05/23/24 0401 05/23/24 0431 05/23/24 0531   BP: 144/65 104/52 142/76 132/63   BP Location:       Patient Position:       Pulse: 77 66 85 69   Resp:       Temp:       TempSrc:       SpO2: 100% 97% 97% 98%   Height:           Active LDAs/IV Access:   Lines, Drains & Airways       Active LDAs       Name Placement date Placement time Site Days    Peripheral IV Anterior;Left Forearm --  --  Forearm  --    Peripheral IV 05/23/24 0449 Distal;Left;Posterior Forearm 05/23/24 0449  Forearm  less than 1                    Labs (abnormal labs have a star):   Labs Reviewed   COMPREHENSIVE METABOLIC PANEL - Abnormal; Notable for the following components:       Result Value    Glucose 203 (*)     BUN 31 (*)     Creatinine 2.36 (*)     Chloride 96 (*)     CO2 30.0  (*)     eGFR 29.3 (*)     All other components within normal limits    Narrative:     GFR Normal >60  Chronic Kidney Disease <60  Kidney Failure <15     BNP (IN-HOUSE) - Abnormal; Notable for the following components:    proBNP 9,034.0 (*)     All other components within normal limits    Narrative:     This assay is used as an aid in the diagnosis of individuals suspected of having heart failure. It can be used as an aid in the diagnosis of acute decompensated heart failure (ADHF) in patients presenting with signs and symptoms of ADHF to the emergency department (ED). In addition, NT-proBNP of <300 pg/mL indicates ADHF is not likely.    Age Range Result Interpretation  NT-proBNP Concentration (pg/mL:      <50             Positive            >450                   Gray                 300-450                    Negative             <300    50-75           Positive            >900                  Gray                300-900                  Negative            <300      >75             Positive            >1800                  Gray                300-1800                  Negative            <300   TROPONIN - Abnormal; Notable for the following components:    HS Troponin T 106 (*)     All other components within normal limits    Narrative:     High Sensitive Troponin T Reference Range:  <14.0 ng/L- Negative Female for AMI  <22.0 ng/L- Negative Male for AMI  >=14 - Abnormal Female indicating possible myocardial injury.  >=22 - Abnormal Male indicating possible myocardial injury.   Clinicians would have to utilize clinical acumen, EKG, Troponin, and serial changes to determine if it is an Acute Myocardial Infarction or myocardial injury due to an underlying chronic condition.        CBC WITH AUTO DIFFERENTIAL - Abnormal; Notable for the following components:    Hemoglobin 11.9 (*)     Neutrophil % 83.9 (*)     Lymphocyte % 7.9 (*)     Lymphocytes, Absolute 0.58 (*)     All other components within normal limits    PROTIME-INR - Abnormal; Notable for the following components:    Protime 15.4 (*)     INR 1.19 (*)     All other components within normal limits   HIGH SENSITIVITIY TROPONIN T 2HR - Abnormal; Notable for the following components:    HS Troponin T 99 (*)     Troponin T Delta -7 (*)     All other components within normal limits    Narrative:     High Sensitive Troponin T Reference Range:  <14.0 ng/L- Negative Female for AMI  <22.0 ng/L- Negative Male for AMI  >=14 - Abnormal Female indicating possible myocardial injury.  >=22 - Abnormal Male indicating possible myocardial injury.   Clinicians would have to utilize clinical acumen, EKG, Troponin, and serial changes to determine if it is an Acute Myocardial Infarction or myocardial injury due to an underlying chronic condition.        RESPIRATORY PANEL PCR W/ COVID-19 (SARS-COV-2), NP SWAB IN UTM/VTP, 2 HR TAT - Normal    Narrative:     In the setting of a positive respiratory panel with a viral infection PLUS a negative procalcitonin without other underlying concern for bacterial infection, consider observing off antibiotics or discontinuation of antibiotics and continue supportive care. If the respiratory panel is positive for atypical bacterial infection (Bordetella pertussis, Chlamydophila pneumoniae, or Mycoplasma pneumoniae), consider antibiotic de-escalation to target atypical bacterial infection.   LACTIC ACID, PLASMA - Normal   PROCALCITONIN - Normal    Narrative:     As a Marker for Sepsis (Non-Neonates):    1. <0.5 ng/mL represents a low risk of severe sepsis and/or septic shock.  2. >2 ng/mL represents a high risk of severe sepsis and/or septic shock.    As a Marker for Lower Respiratory Tract Infections that require antibiotic therapy:    PCT on Admission    Antibiotic Therapy       6-12 Hrs later    >0.5                Strongly Recommended  >0.25 - <0.5        Recommended   0.1 - 0.25          Discouraged              Remeasure/reassess PCT  <0.1          "       Strongly Discouraged     Remeasure/reassess PCT    As 28 day mortality risk marker: \"Change in Procalcitonin Result\" (>80% or <=80%) if Day 0 (or Day 1) and Day 4 values are available. Refer to http://www.[facility]-pct-calculator.com    Change in PCT <=80%  A decrease of PCT levels below or equal to 80% defines a positive change in PCT test result representing a higher risk for 28-day all-cause mortality of patients diagnosed with severe sepsis for septic shock.    Change in PCT >80%  A decrease of PCT levels of more than 80% defines a negative change in PCT result representing a lower risk for 28-day all-cause mortality of patients diagnosed with severe sepsis or septic shock.      APTT - Normal   URINALYSIS W/ MICROSCOPIC IF INDICATED (NO CULTURE)   BASIC METABOLIC PANEL   MANUAL DIFFERENTIAL   CBC WITH AUTO DIFFERENTIAL   POCT GLUCOSE FINGERSTICK   POCT GLUCOSE FINGERSTICK   POCT GLUCOSE FINGERSTICK   POCT GLUCOSE FINGERSTICK   CBC AND DIFFERENTIAL    Narrative:     The following orders were created for panel order CBC & Differential.  Procedure                               Abnormality         Status                     ---------                               -----------         ------                     CBC Auto Differential[[id number]]        Abnormal            Final result                 Please view results for these tests on the individual orders.   CBC AND DIFFERENTIAL    Narrative:     The following orders were created for panel order CBC & Differential.  Procedure                               Abnormality         Status                     ---------                               -----------         ------                     Manual Differential[[id number]]                                                         CBC Auto Differential[[id number]]                                                         Please view results for these tests on the individual orders.       EKG:   ECG 12 Lead Dyspnea "   Preliminary Result   HEART RATE= 67  bpm   RR Interval= 896  ms   WI Interval=   ms   P Horizontal Axis=   deg   P Front Axis=   deg   QRSD Interval= 167  ms   QT Interval= 490  ms   QTcB= 518  ms   QRS Axis= -26  deg   T Wave Axis= 40  deg   - ABNORMAL ECG -   Atrial fibrillation   Right bundle branch block   Artifact in lead(s) III,aVL,aVF,V1,V2,V4,V5,V6   Electronically Signed By:    Date and Time of Study: 2024-05-23 02:15:09          Meds given in ED:   Medications   sodium chloride 0.9 % flush 10 mL (has no administration in time range)   sodium chloride 0.9 % flush 10 mL (has no administration in time range)   sodium chloride 0.9 % flush 10 mL (has no administration in time range)   sodium chloride 0.9 % infusion 40 mL (has no administration in time range)   dextrose (GLUTOSE) oral gel 15 g (has no administration in time range)   dextrose (D50W) (25 g/50 mL) IV injection 25 g (has no administration in time range)   glucagon (GLUCAGEN) injection 1 mg (has no administration in time range)   insulin lispro (HUMALOG/ADMELOG) injection 2-7 Units (has no administration in time range)   sennosides-docusate (PERICOLACE) 8.6-50 MG per tablet 2 tablet (has no administration in time range)     And   polyethylene glycol (MIRALAX) packet 17 g (has no administration in time range)     And   bisacodyl (DULCOLAX) EC tablet 5 mg (has no administration in time range)     And   bisacodyl (DULCOLAX) suppository 10 mg (has no administration in time range)   acetaminophen (TYLENOL) tablet 650 mg (has no administration in time range)     Or   acetaminophen (TYLENOL) 160 MG/5ML oral solution 650 mg (has no administration in time range)     Or   acetaminophen (TYLENOL) suppository 650 mg (has no administration in time range)   ondansetron (ZOFRAN) injection 4 mg (has no administration in time range)       Imaging results:  XR Chest 1 View    Result Date: 5/23/2024  Electronically signed by Eliseo Marcus MD on 05-23-24 at 0500      Ambulatory status:   - stand by assist     Social issues:   Social History     Socioeconomic History    Marital status:      Spouse name: Lissette    Number of children: 3    Years of education: college   Tobacco Use    Smoking status: Never    Smokeless tobacco: Never    Tobacco comments:     CAFFEINE - OCCAS. SODA   Vaping Use    Vaping status: Never Used   Substance and Sexual Activity    Alcohol use: No     Comment: last drink 2 months ago    Drug use: No    Sexual activity: Defer       Peripheral Neurovascular  Peripheral Neurovascular (Adult)  Peripheral Neurovascular WDL: WDL    Neuro Cognitive  Neuro Cognitive (Adult)  Cognitive/Neuro/Behavioral WDL: WDL, orientation, mood/behavior  Orientation: oriented x 4  Mood/Behavior: flat affect    Learning  Learning Assessment (Adult)  Learning Readiness and Ability: no barriers identified  Education Provided  Person Taught: patient  Teaching Method: verbal instruction  Teaching Focus: symptom/problem overview, medication administration, risk factors/triggers, diagnostic test  Education Outcome Evaluation: eager to learn, able to teach back, verbalizes understanding, acceptance expressed    Respiratory  Respiratory (Adult)  Airway WDL: WDL  Respiratory WDL  Respiratory WDL: WDL, rhythm/pattern  Rhythm/Pattern, Respiratory: no shortness of breath reported, depth regular, pattern regular, unlabored    Abdominal Pain       Pain Assessments  Pain (Adult)  (0-10) Pain Rating: Rest: 2  (0-10) Pain Rating: Activity: 2    NIH Stroke Scale       Melisa Dutta RN  05/23/24 06:03 EDT

## 2024-05-23 NOTE — DISCHARGE PLACEMENT REQUEST
"Carlito Mo (68 y.o. Male)       Date of Birth   1955    Social Security Number       Address   [address] [place] [address]    Home Phone   [phone]    MRN   [id number]       [facility]    Marital Status                               Admission Date   5/23/24    Admission Type   Emergency    Admitting Provider   Nehemias Rodriguez MD    Attending Provider   Nehemias Rodriguez MD    Department, Room/Bed   [address], [address]       Discharge Date       Discharge Disposition       Discharge Destination                                 Attending Provider: Nehemias Rodriguez MD    Allergies: Prozac [Fluoxetine Hcl]    Isolation: None   Infection: None   Code Status: Prior    Ht: 182.9 cm (72\")   Wt: --    Admission Cmt: None   Principal Problem: Generalized weakness [R53.1]                   Active Insurance as of 5/23/2024       Primary Coverage       Payor Plan Insurance Group Employer/Plan Group    ANTHEM MEDICARE REPLACEMENT ANTHEM MEDICARE ADVANTAGE [id number]       Payor Plan Address Payor Plan Phone Number Payor Plan Fax Number Effective Dates    [facility] [id number] [phone]  2/1/2023 - None Entered    [facility] [id number]         Subscriber Name Subscriber Birth Date Member ID       CARLITO MO 1955 [id number]                     Emergency Contacts        (Rel.) Home Phone Work Phone Mobile Phone    Lissette Mo (Spouse) [phone] -- [phone]                "

## 2024-05-23 NOTE — PLAN OF CARE
Goal Outcome Evaluation:  Plan of Care Reviewed With: patient  Pt admitted from ER early AM  Data base completed with assistance of patient  Patient unsure of medications  Call placed this afternoon and list of medications received from Joe nicholas faxed to unit  Secure chat to Dr Rodriguez regarding medicaiton list and medications that have been on hold  Pt eating well but sporadically-not always at meal time  Pt know s he will go for dialysis tomorrow and possible discharge

## 2024-05-23 NOTE — H&P
Patient Name:  Carlito Mo  YOB: 1955  MRN:  [id number]  Admit Date:  5/23/2024  Patient Care Team:  Florencia Caballero MD as PCP - General (Internal Medicine)  Amber Murguia MD as Consulting Physician (Cardiology)  Sera La [facility] as Pharmacist  Seth Ladd MD as Surgeon (General Surgery)  Kim Hoyos MD PhD as Consulting Physician (Hematology and Oncology)  Jerome Diallo MD as Referring Physician (Family Medicine)  Jose Enrique Louie MD as Consulting Physician (Gastroenterology)  Nima Huddleston MD as Consulting Physician (Nephrology)      Subjective   History Present Illness     Chief Complaint   Patient presents with   • Weakness - Generalized   • Shortness of Breath         History of Present Illness  Mr. Mo is a 68 y.o. possible history of ESRD, chronic combined heart failure, coronary artery disease, diabetes type 2, history of stroke, hyperlipidemia, hypertension presents from home after returning from dialysis and being too weak to walk up a flight of stairs.  Patient is a trilevel home but cannot make stairs.  Usually gets dialysis Monday Wednesday Friday but had to have dialysis yesterday evening because he missed a treatment.  Patient has a history of atrial fibrillation and he was admitted here for new onset atrial fibrillation at the end of last year.  At the time of discharge he was in normal sinus rhythm and an EKG from January was also normal sinus rhythm.  EKG here now in atrial fibrillation.  Patient with some nasal congestion, but denies any chest pain, dyspnea, abdominal pain, nausea, vomiting, diarrhea, rash, cough.    Review of Systems   Constitutional:  Positive for fatigue. Negative for chills and fever.   Respiratory:  Negative for cough and shortness of breath.    Cardiovascular:  Negative for chest pain and palpitations.   Gastrointestinal:  Negative for abdominal pain, diarrhea, nausea and vomiting.        Personal History     Past  Medical History:   Diagnosis Date   • Acquired hypothyroidism    • Acute congestive heart failure    • Acute respiratory failure with hypoxia    • Acute sinusitis    • SYLVAIN (acute kidney injury)     on CKD   • Anemia    • Arthritis    • CAD (coronary artery disease)    • Cancer     skin   • Chronic combined systolic and diastolic congestive heart failure    • Chronic ischemic heart disease    • Chronic kidney disease (CKD) 04/11/2022    stage 4   • CKD (chronic kidney disease)    • COPD (chronic obstructive pulmonary disease)    • Depression    • Diabetes mellitus type 2, uncontrolled, without complications    • Diabetic neuropathy 04/11/2022    Diabetes mellitus due to underlying condition   • Disease of thyroid gland    • Elevated cholesterol    • Fatigue    • Frequent PVCs    • Generalized weakness    • GERD (gastroesophageal reflux disease)    • Heart attack    • History of coronary artery disease     with remote history of bypass surgery in 2001   • Hyperlipidemia    • Hypertension    • Hypertensive encephalopathy    • Mental status change     Acute   • Nausea & vomiting 12/01/2023   • NO DEVICE/RECALLED    • Pleural effusion    • Pneumonia    • Poor historian    • RBBB (right bundle branch block)    • Stroke     POOR VISION   • TIA (transient ischemic attack)    • Type 2 diabetes mellitus    • Urinary retention    • Vitamin D deficiency      Past Surgical History:   Procedure Laterality Date   • APPENDECTOMY N/A 02/14/2016    Dr. Aelxey Dodson   • ARTERIOVENOUS FISTULA/SHUNT SURGERY Right 06/03/2022    Procedure: RIGHT FOREARM ARTERIOVENOUS FISTULA PLACEMENT RADIOCEPHALIC WITH CEPHALIC VEIN TRANSPOSITION;  Surgeon: Eliseo Willis MD;  Location: [facility] MAIN OR;  Service: Vascular;  Laterality: Right;   • COLONOSCOPY      over 20 years ago.  no polyps    • COLONOSCOPY N/A 09/18/2018    Procedure: COLONOSCOPY;  Surgeon: Jose Enrique Louie MD;  Location: [facility] ENDOSCOPY;  Service: Gastroenterology   •  COLONOSCOPY N/A 2018    IH, EH, polyps (TAs w/low grade dysplasia)   • COLONOSCOPY N/A 2020    Procedure: COLONOSCOPY;  Surgeon: Jose Enrique Louie MD;  Location:  [facility] ENDOSCOPY;  Service: Gastroenterology;  Laterality: N/A;  Pre op-Anemia, Melena, History of Polyps  Post op-To Cecum/TI, Polyp, Poor Prep   • CORONARY ARTERY BYPASS GRAFT  2001   • ENDOSCOPY N/A 2020    Procedure: ESOPHAGOGASTRODUODENOSCOPY with biopsies;  Surgeon: Amanda Lovelace MD;  Location: [facility] ENDOSCOPY;  Service: Gastroenterology;  Laterality: N/A;  pre-anemia, dark stools  post-esophagitis, hiatal hernia   • ENDOSCOPY N/A 09/15/2020    Procedure: ESOPHAGOGASTRODUODENOSCOPY WITH BIOPSIES;  Surgeon: Jose Enrique Louie MD;  Location: [facility] ENDOSCOPY;  Service: Gastroenterology;  Laterality: N/A;  pre: history of melena and esophagitis  post: mild esophagitis and gastritis, small hiatal hernia   • ENDOSCOPY N/A 2023    Procedure: ESOPHAGOGASTRODUODENOSCOPY with bx;  Surgeon: Quoc Elmore MD;  Location: [facility] ENDOSCOPY;  Service: Gastroenterology;  Laterality: N/A;  pre: Coffee-ground emesis  post: hiatal hernia, esophagitis   • HERNIA REPAIR Left 2019   • THORACENTESIS     • TONSILLECTOMY     • VASECTOMY       Family History   Problem Relation Age of Onset   • Diabetes Mother    • Hypertension Mother    • Macular degeneration Mother    • Alcohol abuse Father    • Cancer Father         lung,  age 65   • Heart disease Father    • Alcohol abuse Brother    • Cirrhosis Brother          age 50   • Liver cancer Brother 45   • Diabetes Son    • Kidney failure Son    • Autism Son    • Malig Hyperthermia Neg Hx      Social History     Tobacco Use   • Smoking status: Never   • Smokeless tobacco: Never   • Tobacco comments:     CAFFEINE - OCCAS. SODA   Vaping Use   • Vaping status: Never Used   Substance Use Topics   • Alcohol use: No     Comment: last drink 2 months ago   • Drug use: No      No current facility-administered medications on file prior to encounter.     Current Outpatient Medications on File Prior to Encounter   Medication Sig Dispense Refill   • apixaban (ELIQUIS) 2.5 MG tablet tablet Take 1 tablet by mouth Every 12 (Twelve) Hours. Indications: Atrial Fibrillation 60 tablet 11   • aspirin 81 MG EC tablet Take 1 tablet by mouth Daily.     • budesonide-formoterol (SYMBICORT) 160-4.5 MCG/ACT inhaler Inhale 2 puffs 2 (Two) Times a Day. 1 each 3   • buPROPion XL (WELLBUTRIN XL) 150 MG 24 hr tablet Take 1 tablet by mouth Daily.     • carvedilol (COREG) 3.125 MG tablet Take 1 tablet by mouth 2 (Two) Times a Day.     • famotidine (PEPCID) 10 MG tablet Take 1 tablet by mouth 2 (Two) Times a Day As Needed for Heartburn.     • insulin glargine (LANTUS, SEMGLEE) 100 UNIT/ML injection 4 units 1 once per day     • Insulin Glargine w/ Trans Port (Basaglar Tempo Pen) 100 UNIT/ML solution pen-injector Inject 2 Units under the skin into the appropriate area as directed Every Night.     • INSULIN GLULISINE SC Inject  under the skin into the appropriate area as directed. 4 units- 1 three times per day     • ipratropium-albuterol (COMBIVENT RESPIMAT)  MCG/ACT inhaler Inhale 1 puff 4 (Four) Times a Day As Needed for Wheezing.     • levothyroxine (SYNTHROID, LEVOTHROID) 75 MCG tablet Take 1 tablet by mouth Daily. 30 tablet 5   • pantoprazole (PROTONIX) 40 MG EC tablet Take 1 tablet by mouth 2 (Two) Times a Day Before Meals.     • rosuvastatin (CRESTOR) 10 MG tablet Take 1 tablet by mouth Daily.     • sodium bicarbonate 650 MG tablet Take 1 tablet by mouth 3 (Three) Times a Day. (Patient taking differently: Take 1 tablet by mouth 3 (Three) Times a Day. Pt is unsure if he's currently taking) 90 tablet 3   • tamsulosin (FLOMAX) 0.4 MG capsule 24 hr capsule Take 1 capsule by mouth Daily.     • torsemide (DEMADEX) 20 MG tablet Take 1 tablet by mouth Daily.     • vitamin D3 125 MCG (5000 UT) capsule capsule  Take 1 capsule by mouth Daily.       Allergies   Allergen Reactions   • Prozac [Fluoxetine Hcl] Other (See Comments)     shaking       Objective    Objective     Vital Signs  Temp:  [97.3 °F (36.3 °C)-97.8 °F (36.6 °C)] 97.3 °F (36.3 °C)  Heart Rate:  [61-85] 71  Resp:  [18] 18  BP: (104-144)/(52-76) 144/60  SpO2:  [97 %-100 %] 98 %  on   ;   Device (Oxygen Therapy): room air  Body mass index is 19.26 kg/m².    Physical Exam  Vitals and nursing note reviewed.   Constitutional:       General: He is not in acute distress.  Cardiovascular:      Rate and Rhythm: Normal rate. Rhythm irregular.   Pulmonary:      Effort: Pulmonary effort is normal. No respiratory distress.   Abdominal:      General: Abdomen is flat. There is no distension.      Tenderness: There is no abdominal tenderness.   Musculoskeletal:         General: No swelling or deformity.   Skin:     General: Skin is warm and dry.   Neurological:      General: No focal deficit present.      Mental Status: He is alert. Mental status is at baseline.         Results Review:  I reviewed the patient's new clinical results.  I reviewed the patient's new imaging results and agree with the interpretation.  I reviewed the patient's other test results and agree with the interpretation  I personally viewed and interpreted the patient's EKG/Telemetry data  Discussed with ED provider.    Lab Results (last 24 hours)       Procedure Component Value Units Date/Time    CBC & Differential [[id number]]  (Abnormal) Collected: 05/23/24 0249    Specimen: Blood Updated: 05/23/24 0258    Narrative:      The following orders were created for panel order CBC & Differential.  Procedure                               Abnormality         Status                     ---------                               -----------         ------                     CBC Auto Differential[[id number]]        Abnormal            Final result                 Please view results for these tests on the individual  "orders.    Comprehensive Metabolic Panel [[id number]]  (Abnormal) Collected: 05/23/24 0249    Specimen: Blood Updated: 05/23/24 0320     Glucose 203 mg/dL      BUN 31 mg/dL      Creatinine 2.36 mg/dL      Sodium 138 mmol/L      Potassium 4.6 mmol/L      Chloride 96 mmol/L      CO2 30.0 mmol/L      Calcium 9.3 mg/dL      Total Protein 7.5 g/dL      Albumin 4.7 g/dL      ALT (SGPT) 11 U/L      AST (SGOT) 11 U/L      Alkaline Phosphatase 101 U/L      Total Bilirubin 0.2 mg/dL      Globulin 2.8 gm/dL      A/G Ratio 1.7 g/dL      BUN/Creatinine Ratio 13.1     Anion Gap 12.0 mmol/L      eGFR 29.3 mL/min/1.73     Narrative:      GFR Normal >60  Chronic Kidney Disease <60  Kidney Failure <15      Lactic Acid, Plasma [[id number]]  (Normal) Collected: 05/23/24 0249    Specimen: Blood Updated: 05/23/24 0314     Lactate 1.1 mmol/L     Procalcitonin [[id number]]  (Normal) Collected: 05/23/24 0249    Specimen: Blood Updated: 05/23/24 0327     Procalcitonin 0.13 ng/mL     Narrative:      As a Marker for Sepsis (Non-Neonates):    1. <0.5 ng/mL represents a low risk of severe sepsis and/or septic shock.  2. >2 ng/mL represents a high risk of severe sepsis and/or septic shock.    As a Marker for Lower Respiratory Tract Infections that require antibiotic therapy:    PCT on Admission    Antibiotic Therapy       6-12 Hrs later    >0.5                Strongly Recommended  >0.25 - <0.5        Recommended   0.1 - 0.25          Discouraged              Remeasure/reassess PCT  <0.1                Strongly Discouraged     Remeasure/reassess PCT    As 28 day mortality risk marker: \"Change in Procalcitonin Result\" (>80% or <=80%) if Day 0 (or Day 1) and Day 4 values are available. Refer to [email or web address]://[email or web address]-pct-[email or web address]    Change in PCT <=80%  A decrease of PCT levels below or equal to 80% defines a positive change in PCT test result representing a higher risk for 28-day all-cause mortality of patients diagnosed with severe sepsis for septic " shock.    Change in PCT >80%  A decrease of PCT levels of more than 80% defines a negative change in PCT result representing a lower risk for 28-day all-cause mortality of patients diagnosed with severe sepsis or septic shock.       BNP [[id number]]  (Abnormal) Collected: 05/23/24 0249    Specimen: Blood Updated: 05/23/24 0327     proBNP 9,034.0 pg/mL     Narrative:      This assay is used as an aid in the diagnosis of individuals suspected of having heart failure. It can be used as an aid in the diagnosis of acute decompensated heart failure (ADHF) in patients presenting with signs and symptoms of ADHF to the emergency department (ED). In addition, NT-proBNP of <300 pg/mL indicates ADHF is not likely.    Age Range Result Interpretation  NT-proBNP Concentration (pg/mL:      <50             Positive            >450                   Gray                 300-450                    Negative             <300    50-75           Positive            >900                  Gray                300-900                  Negative            <300      >75             Positive            >1800                  Gray                300-1800                  Negative            <300    High Sensitivity Troponin T [[id number]]  (Abnormal) Collected: 05/23/24 0249    Specimen: Blood Updated: 05/23/24 0332     HS Troponin T 106 ng/L     Narrative:      High Sensitive Troponin T Reference Range:  <14.0 ng/L- Negative Female for AMI  <22.0 ng/L- Negative Male for AMI  >=14 - Abnormal Female indicating possible myocardial injury.  >=22 - Abnormal Male indicating possible myocardial injury.   Clinicians would have to utilize clinical acumen, EKG, Troponin, and serial changes to determine if it is an Acute Myocardial Infarction or myocardial injury due to an underlying chronic condition.         CBC Auto Differential [[id number]]  (Abnormal) Collected: 05/23/24 0249    Specimen: Blood Updated: 05/23/24 0258     WBC 7.37 10*3/mm3      RBC 4.16  10*6/mm3      Hemoglobin 11.9 g/dL      Hematocrit 37.7 %      MCV 90.6 fL      MCH 28.6 pg      MCHC 31.6 g/dL      RDW 13.4 %      RDW-SD 44.1 fl      MPV 10.1 fL      Platelets 242 10*3/mm3      Neutrophil % 83.9 %      Lymphocyte % 7.9 %      Monocyte % 6.6 %      Eosinophil % 1.1 %      Basophil % 0.1 %      Immature Grans % 0.4 %      Neutrophils, Absolute 6.18 10*3/mm3      Lymphocytes, Absolute 0.58 10*3/mm3      Monocytes, Absolute 0.49 10*3/mm3      Eosinophils, Absolute 0.08 10*3/mm3      Basophils, Absolute 0.01 10*3/mm3      Immature Grans, Absolute 0.03 10*3/mm3      nRBC 0.0 /100 WBC     Protime-INR [[id number]]  (Abnormal) Collected: 05/23/24 0249    Specimen: Blood Updated: 05/23/24 0313     Protime 15.4 Seconds      INR 1.19    aPTT [[id number]]  (Normal) Collected: 05/23/24 0249    Specimen: Blood Updated: 05/23/24 0313     PTT 27.5 seconds     Respiratory Panel PCR w/COVID-19(SARS-CoV-2) [facility]-House, NP Swab in UTM/VTM, 2 HR TAT - Swab, Nasopharynx [[id number]]  (Normal) Collected: 05/23/24 0257    Specimen: Swab from Nasopharynx Updated: 05/23/24 0355     ADENOVIRUS, PCR Not Detected     Coronavirus 229E Not Detected     Coronavirus HKU1 Not Detected     Coronavirus NL63 Not Detected     Coronavirus OC43 Not Detected     COVID19 Not Detected     Human Metapneumovirus Not Detected     Human Rhinovirus/Enterovirus Not Detected     Influenza A PCR Not Detected     Influenza B PCR Not Detected     Parainfluenza Virus 1 Not Detected     Parainfluenza Virus 2 Not Detected     Parainfluenza Virus 3 Not Detected     Parainfluenza Virus 4 Not Detected     RSV, PCR Not Detected     Bordetella pertussis pcr Not Detected     Bordetella parapertussis PCR Not Detected     Chlamydophila pneumoniae PCR Not Detected     Mycoplasma pneumo by PCR Not Detected    Narrative:      In the setting of a positive respiratory panel with a viral infection PLUS a negative procalcitonin without other  underlying concern for bacterial infection, consider observing off antibiotics or discontinuation of antibiotics and continue supportive care. If the respiratory panel is positive for atypical bacterial infection (Bordetella pertussis, Chlamydophila pneumoniae, or Mycoplasma pneumoniae), consider antibiotic de-escalation to target atypical bacterial infection.    High Sensitivity Troponin T 2Hr [[id number]]  (Abnormal) Collected: 05/23/24 0448    Specimen: Blood Updated: 05/23/24 0521     HS Troponin T 99 ng/L      Troponin T Delta -7 ng/L     Narrative:      High Sensitive Troponin T Reference Range:  <14.0 ng/L- Negative Female for AMI  <22.0 ng/L- Negative Male for AMI  >=14 - Abnormal Female indicating possible myocardial injury.  >=22 - Abnormal Male indicating possible myocardial injury.   Clinicians would have to utilize clinical acumen, EKG, Troponin, and serial changes to determine if it is an Acute Myocardial Infarction or myocardial injury due to an underlying chronic condition.         TSH Rfx On Abnormal To Free T4 [[id number]]  (Abnormal) Collected: 05/23/24 0448    Specimen: Blood Updated: 05/23/24 0849     TSH 4.370 uIU/mL     T4, Free [[id number]]  (Normal) Collected: 05/23/24 0448    Specimen: Blood Updated: 05/23/24 0918     Free T4 1.47 ng/dL     Narrative:      Results may be falsely increased if patient taking Biotin.      POC Glucose Once [[id number]]  (Abnormal) Collected: 05/23/24 0721    Specimen: Blood Updated: 05/23/24 0722     Glucose 135 mg/dL     Basic Metabolic Panel [[id number]]  (Abnormal) Collected: 05/23/24 0846    Specimen: Blood Updated: 05/23/24 0935     Glucose 134 mg/dL      BUN 33 mg/dL      Creatinine 2.63 mg/dL      Sodium 138 mmol/L      Potassium 4.3 mmol/L      Chloride 99 mmol/L      CO2 25.9 mmol/L      Calcium 9.0 mg/dL      BUN/Creatinine Ratio 12.5     Anion Gap 13.1 mmol/L      eGFR 25.7 mL/min/1.73     Narrative:      GFR Normal >60  Chronic Kidney Disease <60  Kidney  Failure <15      CBC & Differential [[id number]]  (Abnormal) Collected: 05/23/24 0846    Specimen: Blood Updated: 05/23/24 0908    Narrative:      The following orders were created for panel order CBC & Differential.  Procedure                               Abnormality         Status                     ---------                               -----------         ------                     Manual Differential[[id number]]                              In process                 CBC Auto Differential[[id number]]        Abnormal            Final result                 Please view results for these tests on the individual orders.    Manual Differential [[id number]] Collected: 05/23/24 0846    Specimen: Blood Updated: 05/23/24 0859    CBC Auto Differential [[id number]]  (Abnormal) Collected: 05/23/24 0846    Specimen: Blood Updated: 05/23/24 0908     WBC 6.05 10*3/mm3      RBC 3.83 10*6/mm3      Hemoglobin 10.8 g/dL      Hematocrit 33.9 %      MCV 88.5 fL      MCH 28.2 pg      MCHC 31.9 g/dL      RDW 13.4 %      RDW-SD 43.1 fl      MPV 9.8 fL      Platelets 223 10*3/mm3             Imaging Results (Last 24 Hours)       Procedure Component Value Units Date/Time    XR Chest 1 View [[id number]] Collected: 05/23/24 0501     Updated: 05/23/24 0501    Narrative:        Patient: ECTOR SANCHEZ  Time Out: 05:00  Exam(s): XR CXR 1 VIEW     EXAM:    XR Chest, 1 View    CLINICAL HISTORY:     Reason for exam: soa.    TECHNIQUE:    Frontal view of the chest.    COMPARISON:  Single view of the chest January 4 2024     IMPRESSION:  1.  Unchanged sternotomy changes.  2.  No acute cardiopulmonary abnormality.  2.  Chronic left posterior sixth rib fracture.    Impression:          Electronically signed by Eliseo Marcus MD on 05-23-24 at 0500            Results for orders placed during the hospital encounter of 03/06/23    Adult Transthoracic Echo Complete W/ Cont if Necessary Per Protocol    Interpretation Summary  •  There is akinesis of the mid  to distal inferoseptum, distal inferior wall, and inferior apex  •  Left ventricular ejection fraction appears to be 41 - 45%.  •  Left ventricular diastolic function is consistent with (grade III w/high LAP) fixed restrictive pattern.  •  The right ventricular cavity is mildly dilated. Normal right ventricular systolic function noted.  •  The left atrial cavity is moderately dilated.  •  Mild tricuspid valve regurgitation is present.  •  Calculated right ventricular systolic pressure from tricuspid regurgitation is 59 mmHg.  •  The inferior vena cava is dilated. IVC inspiratory collapse is absent.  •  There is no evidence of pericardial effusion.      ECG 12 Lead Dyspnea   Final Result   HEART RATE= 67  bpm   RR Interval= 896  ms   OK Interval=   ms   P Horizontal Axis=   deg   P Front Axis=   deg   QRSD Interval= 167  ms   QT Interval= 490  ms   QTcB= 518  ms   QRS Axis= -26  deg   T Wave Axis= 40  deg   - ABNORMAL ECG -   Atrial fibrillation   Right bundle branch block   When compared with ECG of 05-Jan-2024 5:43:49,   Atrial fibrillation has replaced sinus rhythm   Electronically Signed By: Grupo Hurst ([facility]) 23-May-2024 08:18:15   Date and Time of Study: 2024-05-23 02:15:09      Telemetry Scan   Final Result           Assessment/Plan     Active Hospital Problems    Diagnosis  POA   • **Generalized weakness [R53.1]  Yes      Resolved Hospital Problems   No resolved problems to display.     Atrial fibrillation  Chronic systolic heart failure  Coronary artery disease  -History of paroxysmal atrial fibrillation but did previously in sinus rhythm on last EKGs  -No evidence of infection.  RVP negative, chest x-ray with no infiltrate or edema.  -BNP and troponin elevated, likely from ESRD, chest x-ray without any evidence of edema no lower extremity edema  -Cardiology consulted, appreciate recommendations  -Continue Coreg, Eliquis, rosuvastatin, aspirin    Hypothyroidism-continue home Synthroid    GERD-continue  home PPI    Diabetes type 2, A1c 6.9%  -Patient states he was not taking any insulin at home  -Low-dose sliding scale insulin    ESRD  -Nephrology consulted  -Continue sodium bicarb        I discussed the patient's findings and my recommendations with patient and nursing staff.    VTE Prophylaxis - Eliquis (home med).  Code Status - Full code.       Nehemias Rodriguez MD  [facility]  05/23/24  09:58 EDT

## 2024-05-23 NOTE — CONSULTS
"Date of Hospital Visit: 24  Encounter Provider: Chrsitiano Badillo MD  Place of Service: [facility]  Patient Name: Carlito Mo  :1955  Referral Provider: Sergio Burgess MD    Chief complaint: weakness    History of Present Illness    Mr. Mo is a 69yo man with multiple chronic medical conditions; he is followed by my partner Dr Murguia. He has a history of CAD s/p CABG in . He has a history of cardiomyopathy, with EF ranging anywhere from 30 to 45%. He has ESRD on HD. He has a known history of PACs and PAF. He is on apixaban and aspirin at home. He has a history of stroke.     His last echo in 2023 showed mild LV systolic dysfunction, EF 40-45%. He is not on ACE/ARB/ARNI/SGLT2i/MRA due to renal dysfunction.     He unfortunately slipped then and missed his usual dialysis session and underwent dialysis very late last night.  He ended at 10:30 PM.  When he got home, he was so weak that he could hardly get up the stairs.  He was brought to the hospital for severe generalized weakness.  We were consulted for \"atrial fibrillation.\"  However, he is not in atrial fibrillation.    He denies chest pain, orthopnea, PND, shortness of breath, or palpitations.       Past Medical History:   Diagnosis Date    Acquired hypothyroidism     Acute sinusitis     SYLVAIN (acute kidney injury)     on CKD    Anemia     Arthritis     Atrial fibrillation     CAD (coronary artery disease)     Chronic combined systolic and diastolic congestive heart failure     COPD (chronic obstructive pulmonary disease)     Depression     Diabetic neuropathy 2022    Diabetes mellitus due to underlying condition    Esophagitis 06/10/2020    Added automatically from request for surgery [id number]      ESRD (end stage renal disease) 2023    Frequent PVCs     Generalized weakness     GERD (gastroesophageal reflux disease)     Helicobacter pylori gastritis 2020    Hyperlipidemia     " Hypertension     Hypertensive encephalopathy     Pleural effusion     Pneumonia     Poor historian     RBBB (right bundle branch block)     Stroke     POOR VISION    TIA (transient ischemic attack)     Type 2 diabetes mellitus     Urinary retention     Vitamin D deficiency        Past Surgical History:   Procedure Laterality Date    APPENDECTOMY N/A 02/14/2016    Dr. Alexey Dodson    ARTERIOVENOUS FISTULA/SHUNT SURGERY Right 06/03/2022    Procedure: RIGHT FOREARM ARTERIOVENOUS FISTULA PLACEMENT RADIOCEPHALIC WITH CEPHALIC VEIN TRANSPOSITION;  Surgeon: Eliseo Willis MD;  Location: [facility] MAIN OR;  Service: Vascular;  Laterality: Right;    COLONOSCOPY      over 20 years ago.  no polyps     COLONOSCOPY N/A 09/18/2018    Procedure: COLONOSCOPY;  Surgeon: Jose Enrique Louie MD;  Location: [facility] ENDOSCOPY;  Service: Gastroenterology    COLONOSCOPY N/A 09/19/2018    IH, EH, polyps (TAs w/low grade dysplasia)    COLONOSCOPY N/A 06/01/2020    Procedure: COLONOSCOPY;  Surgeon: Jose Enrique Louie MD;  Location: [facility] ENDOSCOPY;  Service: Gastroenterology;  Laterality: N/A;  Pre op-Anemia, Melena, History of Polyps  Post op-To Cecum/TI, Polyp, Poor Prep    CORONARY ARTERY BYPASS GRAFT  11/2001    ENDOSCOPY N/A 05/29/2020    Procedure: ESOPHAGOGASTRODUODENOSCOPY with biopsies;  Surgeon: Amanda Lovelace MD;  Location: [facility] ENDOSCOPY;  Service: Gastroenterology;  Laterality: N/A;  pre-anemia, dark stools  post-esophagitis, hiatal hernia    ENDOSCOPY N/A 09/15/2020    Procedure: ESOPHAGOGASTRODUODENOSCOPY WITH BIOPSIES;  Surgeon: Jose Enrique Louie MD;  Location: [facility] ENDOSCOPY;  Service: Gastroenterology;  Laterality: N/A;  pre: history of melena and esophagitis  post: mild esophagitis and gastritis, small hiatal hernia    ENDOSCOPY N/A 12/4/2023    Procedure: ESOPHAGOGASTRODUODENOSCOPY with bx;  Surgeon: Quoc Elmore MD;  Location: [facility] ENDOSCOPY;  Service: Gastroenterology;  Laterality: N/A;  pre:  Coffee-ground emesis  post: hiatal hernia, esophagitis    HERNIA REPAIR Left 12/05/2019    THORACENTESIS      TONSILLECTOMY      VASECTOMY         Prior to Admission medications    Medication Sig Start Date End Date Taking? Authorizing Provider   apixaban (ELIQUIS) 2.5 MG tablet tablet Take 1 tablet by mouth Every 12 (Twelve) Hours. Indications: Atrial Fibrillation 2/27/24   Nida Lopes APRN   aspirin 81 MG EC tablet Take 1 tablet by mouth Daily.    Alonzo Dean MD   budesonide-formoterol (SYMBICORT) 160-4.5 MCG/ACT inhaler Inhale 2 puffs 2 (Two) Times a Day. 5/5/23   Smith Henson MD   buPROPion XL (WELLBUTRIN XL) 150 MG 24 hr tablet Take 1 tablet by mouth Daily.    Alonzo Dean MD   carvedilol (COREG) 3.125 MG tablet Take 1 tablet by mouth 2 (Two) Times a Day.    Alonzo Dean MD   famotidine (PEPCID) 10 MG tablet Take 1 tablet by mouth 2 (Two) Times a Day As Needed for Heartburn.    Alonzo Dean MD   insulin glargine (LANTUS, SEMGLEE) 100 UNIT/ML injection 4 units 1 once per day    Alonzo Dean MD   Insulin Glargine w/ Trans Port (Basaglar Tempo Pen) 100 UNIT/ML solution pen-injector Inject 2 Units under the skin into the appropriate area as directed Every Night.    Alonzo Dean MD   INSULIN GLULISINE SC Inject  under the skin into the appropriate area as directed. 4 units- 1 three times per day    Alonzo Dean MD   ipratropium-albuterol (COMBIVENT RESPIMAT)  MCG/ACT inhaler Inhale 1 puff 4 (Four) Times a Day As Needed for Wheezing.    Alonzo Dean MD   levothyroxine (SYNTHROID, LEVOTHROID) 75 MCG tablet Take 1 tablet by mouth Daily. 2/8/22   Erica Welsh MD   pantoprazole (PROTONIX) 40 MG EC tablet Take 1 tablet by mouth 2 (Two) Times a Day Before Meals. 12/11/23   Aria Red MD   rosuvastatin (CRESTOR) 10 MG tablet Take 1 tablet by mouth Daily. 12/12/23   Aria Red MD   sodium bicarbonate 650 MG  tablet Take 1 tablet by mouth 3 (Three) Times a Day.  Patient taking differently: Take 1 tablet by mouth 3 (Three) Times a Day. Pt is unsure if he's currently taking 23   Smith Henson MD   tamsulosin (FLOMAX) 0.4 MG capsule 24 hr capsule Take 1 capsule by mouth Daily.    ProviderAlonzo MD   torsemide (DEMADEX) 20 MG tablet Take 1 tablet by mouth Daily.    ProviderAlonzo MD   vitamin D3 125 MCG (5000 UT) capsule capsule Take 1 capsule by mouth Daily.    ProviderAlonzo MD       Social History     Socioeconomic History    Marital status:      Spouse name: Lissette    Number of children: 3    Years of education: college   Tobacco Use    Smoking status: Never    Smokeless tobacco: Never    Tobacco comments:     CAFFEINE - OCCAS. SODA   Vaping Use    Vaping status: Never Used   Substance and Sexual Activity    Alcohol use: No     Comment: last drink 2 months ago    Drug use: No    Sexual activity: Defer       Family History   Problem Relation Age of Onset    Diabetes Mother     Hypertension Mother     Macular degeneration Mother     Alcohol abuse Father     Cancer Father         lung,  age 65    Heart disease Father     Alcohol abuse Brother     Cirrhosis Brother          age 50    Liver cancer Brother 45    Diabetes Son     Kidney failure Son     Autism Son     Malig Hyperthermia Neg Hx        Review of Systems   Constitutional:  Positive for fatigue.   Neurological:  Positive for weakness.   All other systems reviewed and are negative.       Objective:     Vitals:    24 0431 24 0531 24 0736 24 1321   BP: 142/76 132/63 144/60 111/54   BP Location:   Left arm Left arm   Patient Position:   Lying Lying   Pulse: 85 69 71 78   Resp:   18 18   Temp:   97.3 °F (36.3 °C) 97.3 °F (36.3 °C)   TempSrc:   Oral Oral   SpO2: 97% 98% 98% 100%   Height:         Body mass index is 19.26 kg/m².    Last Weight and Admission Weight    There were no vitals filed for  "this visit.  Flowsheet Rows      Flowsheet Row First Filed Value   Admission Height 182.9 cm (72\") Documented at 05/23/2024 0114   Admission Weight --              Intake/Output Summary (Last 24 hours) at 5/23/2024 1707  Last data filed at 5/23/2024 1539  Gross per 24 hour   Intake 600 ml   Output 200 ml   Net 400 ml         Physical Exam  Vitals reviewed.   Constitutional:       Appearance: He is well-developed.      Comments: Appears chronically ill but not in distress   HENT:      Head: Normocephalic.      Nose: Nose normal.   Eyes:      Conjunctiva/sclera: Conjunctivae normal.   Neck:      Vascular: No JVD.   Cardiovascular:      Rate and Rhythm: Normal rate and regular rhythm. Frequent Extrasystoles are present.     Heart sounds: Normal heart sounds.   Pulmonary:      Effort: Pulmonary effort is normal.      Breath sounds: Normal breath sounds.   Abdominal:      Palpations: Abdomen is soft.   Musculoskeletal:         General: No swelling. Normal range of motion.      Cervical back: Normal range of motion.   Skin:     General: Skin is warm.   Neurological:      Mental Status: He is alert.   Psychiatric:         Mood and Affect: Mood normal.         Lab Review:                Results from last 7 days   Lab Units 05/23/24  0846   SODIUM mmol/L 138   POTASSIUM mmol/L 4.3   CHLORIDE mmol/L 99   CO2 mmol/L 25.9   BUN mg/dL 33*   CREATININE mg/dL 2.63*   GLUCOSE mg/dL 134*   CALCIUM mg/dL 9.0     Results from last 7 days   Lab Units 05/23/24  0448 05/23/24  0249   HSTROP T ng/L 99* 106*     Results from last 7 days   Lab Units 05/23/24  0846   WBC 10*3/mm3 6.05   HEMOGLOBIN g/dL 10.8*   HEMATOCRIT % 33.9*   PLATELETS 10*3/mm3 223     Results from last 7 days   Lab Units 05/23/24  0249   INR  1.19*   APTT seconds 27.5                     I personally viewed and interpreted the patient's EKG/Telemetry data    Assessment/Plan:     1. Generalized weakness  2. PAF, currently in SR with PACs  3. HTN  4. Mild LV systolic " dysfunction  5. ESRD    His EKG showed sinus rhythm with PACs as well as severe baseline artifact.  His telemetry shows sinus rhythm with PACs.  I do not feel that there is a cardiac cause of his severe overwhelming/generalized fatigue.  His proBNP and troponin are actually improved compared to previous values.  He does not require further cardiac testing at this time.  He can remain on his usual cardiac medications and keep his usual cardiac follow-up in clinic.

## 2024-05-23 NOTE — THERAPY EVALUATION
Patient Name: Carlito Mo  : 1955    MRN: [id number]                              Today's Date: 2024       Admit Date: 2024    Visit Dx:     ICD-10-CM ICD-9-CM   1. Generalized weakness  R53.1 780.79   2. Exertional dyspnea  R06.09 786.09   3. ESRD (end stage renal disease) on dialysis  N18.6 585.6    Z99.2 V45.11     Patient Active Problem List   Diagnosis    Major depressive disorder with single episode, in partial remission    Other hyperlipidemia    Type 2 diabetes mellitus, with long-term current use of insulin    Vitamin D deficiency    Erectile dysfunction    Chronic fatigue    Type 2 diabetes mellitus with ophthalmic complication    Benign prostatic hyperplasia with nocturia    History of basal cell carcinoma    Acquired hypothyroidism    Recurrent strokes    Suspected sleep apnea    YAW (obstructive sleep apnea)    Coronary artery disease involving native coronary artery of native heart without angina pectoris    Chronically elevated troponin    Colon cancer screening    Enlarged lymph nodes    Functional gait abnormality    History of myocardial infarction    Insomnia    Iron deficiency anemia    Major depression, recurrent    Essential hypertension    Acute on chronic renal insufficiency    Falls frequently    Acute on chronic anemia    Neurogenic orthostatic hypotension    Anemia, chronic disease    History of colon polyps    Helicobacter pylori gastritis    Esophagitis    Embolic stroke    Patent foramen ovale    Cardiomyopathy    Diarrhea    Generalized weakness    Paroxysmal atrial fibrillation    Chronic anticoagulation    Acute ischemic stroke    History of stroke    Iron deficiency anemia    Acute HFrEF (heart failure with reduced ejection fraction)    ESRD (end stage renal disease)    Hemoptysis    Chronic HFrEF (heart failure with reduced ejection fraction)    Hemodialysis patient    Exertional dyspnea     Past Medical History:   Diagnosis Date    Acquired hypothyroidism      Acute sinusitis     SYLVAIN (acute kidney injury)     on CKD    Anemia     Arthritis     Atrial fibrillation     CAD (coronary artery disease)     Chronic combined systolic and diastolic congestive heart failure     COPD (chronic obstructive pulmonary disease)     Depression     Diabetic neuropathy 04/11/2022    Diabetes mellitus due to underlying condition    Esophagitis 06/10/2020    Added automatically from request for surgery [id number]      ESRD (end stage renal disease) 12/01/2023    Frequent PVCs     Generalized weakness     GERD (gastroesophageal reflux disease)     Helicobacter pylori gastritis 06/04/2020    Hyperlipidemia     Hypertension     Hypertensive encephalopathy     Pleural effusion     Pneumonia     Poor historian     RBBB (right bundle branch block)     Stroke     POOR VISION    TIA (transient ischemic attack)     Type 2 diabetes mellitus     Urinary retention     Vitamin D deficiency      Past Surgical History:   Procedure Laterality Date    APPENDECTOMY N/A 02/14/2016    Dr. Alexey Dodson    ARTERIOVENOUS FISTULA/SHUNT SURGERY Right 06/03/2022    Procedure: RIGHT FOREARM ARTERIOVENOUS FISTULA PLACEMENT RADIOCEPHALIC WITH CEPHALIC VEIN TRANSPOSITION;  Surgeon: Eliseo Willis MD;  Location: [facility] MAIN OR;  Service: Vascular;  Laterality: Right;    COLONOSCOPY      over 20 years ago.  no polyps     COLONOSCOPY N/A 09/18/2018    Procedure: COLONOSCOPY;  Surgeon: Jose Enrique Louie MD;  Location: [facility] ENDOSCOPY;  Service: Gastroenterology    COLONOSCOPY N/A 09/19/2018    IH, EH, polyps (TAs w/low grade dysplasia)    COLONOSCOPY N/A 06/01/2020    Procedure: COLONOSCOPY;  Surgeon: Jose Enrique Louie MD;  Location: [facility] ENDOSCOPY;  Service: Gastroenterology;  Laterality: N/A;  Pre op-Anemia, Melena, History of Polyps  Post op-To Cecum/TI, Polyp, Poor Prep    CORONARY ARTERY BYPASS GRAFT  11/2001    ENDOSCOPY N/A 05/29/2020    Procedure: ESOPHAGOGASTRODUODENOSCOPY with biopsies;  Surgeon:  Amanda Lovelace MD;  Location: [facility] ENDOSCOPY;  Service: Gastroenterology;  Laterality: N/A;  pre-anemia, dark stools  post-esophagitis, hiatal hernia    ENDOSCOPY N/A 09/15/2020    Procedure: ESOPHAGOGASTRODUODENOSCOPY WITH BIOPSIES;  Surgeon: Jose Enrique Louie MD;  Location: [facility] ENDOSCOPY;  Service: Gastroenterology;  Laterality: N/A;  pre: history of melena and esophagitis  post: mild esophagitis and gastritis, small hiatal hernia    ENDOSCOPY N/A 12/4/2023    Procedure: ESOPHAGOGASTRODUODENOSCOPY with bx;  Surgeon: Quoc Elmore MD;  Location: [facility] ENDOSCOPY;  Service: Gastroenterology;  Laterality: N/A;  pre: Coffee-ground emesis  post: hiatal hernia, esophagitis    HERNIA REPAIR Left 12/05/2019    THORACENTESIS      TONSILLECTOMY      VASECTOMY        General Information       Row Name 05/23/24 1454          Physical Therapy Time and Intention    Document Type evaluation  -EE     Mode of Treatment individual therapy;physical therapy  -EE       Row Name 05/23/24 1454          General Information    Patient Profile Reviewed yes  -EE     Prior Level of Function independent:;all household mobility;community mobility  cane  -EE     Existing Precautions/Restrictions fall  -EE     Barriers to Rehab none identified  -EE       Row Name 05/23/24 1454          Living Environment    People in Home spouse  -EE       Row Name 05/23/24 1454          Home Main Entrance    Number of Stairs, Main Entrance four  -EE       Row Name 05/23/24 1454          Stairs Within Home, Primary    Stairs, Within Home, Primary tri level home  -EE     Number of Stairs, Within Home, Primary ten  -EE     Stair Railings, Within Home, Primary railings safe and in good condition  -EE       Row Name 05/23/24 1454          Cognition    Orientation Status (Cognition) oriented x 4  -EE       Row Name 05/23/24 1457          Safety Issues, Functional Mobility    Impairments Affecting Function (Mobility) balance;endurance/activity  tolerance;strength  -EE               User Key  (r) = Recorded By, (t) = Taken By, (c) = Cosigned By      Initials Name Provider Type    EE Katrin Garcia PT Physical Therapist                   Mobility       Row Name 05/23/24 1454          Bed Mobility    Bed Mobility supine-sit;sit-supine  -EE     Supine-Sit [place] (Bed Mobility) contact guard  -EE     Sit-Supine [place] (Bed Mobility) standby assist  -EE     Assistive Device (Bed Mobility) head of bed elevated  -EE     Comment, (Bed Mobility) increased time require to complete  -EE       Row Name 05/23/24 1454          Sit-Stand Transfer    Sit-Stand [place] (Transfers) contact guard;minimum assist (75% patient effort);verbal cues  -EE     Assistive Device (Sit-Stand Transfers) walker, front-wheeled  -EE     Comment, (Sit-Stand Transfer) cues for hand placement  -EE       Row Name 05/23/24 1454          Gait/Stairs (Locomotion)    [place] Level (Gait) contact guard;verbal cues  -EE     Assistive Device (Gait) walker, front-wheeled  -EE     Distance in Feet (Gait) 30  -EE     Deviations/Abnormal Patterns (Gait) eli decreased;base of support, wide  -EE     Bilateral Gait Deviations heel strike decreased;forward flexed posture  -EE     Comment, (Gait/Stairs) Cues for upright posture; dizziness and fatigue limiting ambulation distance  -EE               User Key  (r) = Recorded By, (t) = Taken By, (c) = Cosigned By      Initials Name Provider Type    EE Katrin Garcia PT Physical Therapist                   Obj/Interventions       Row Name 05/23/24 1500          Range of Motion Comprehensive    General Range of Motion bilateral lower extremity ROM WFL  -EE       Row Name 05/23/24 1500          Strength Comprehensive (MMT)    General Manual Muscle Testing (MMT) Assessment lower extremity strength deficits identified  -EE     Comment, General Manual Muscle Testing (MMT) Assessment B LEs grossly 4-/5  -EE       Row Name 05/23/24 1500           Balance    Balance Assessment sitting static balance;standing static balance;standing dynamic balance  -EE     Static Sitting Balance independent  -EE     Position, Sitting Balance unsupported;sitting edge of bed  -EE     Static Standing Balance standby assist;contact guard  -EE     Dynamic Standing Balance contact guard  -EE     Position/Device Used, Standing Balance supported;walker, front-wheeled  -EE     Comment, Balance Static standing with CGA x5 min during ADLs; intermittent UE support on rwx and grab bar as needed  -EE       Row Name 05/23/24 1500          Sensory Assessment (Somatosensory)    Sensory Assessment (Somatosensory) LE sensation intact  -EE               User Key  (r) = Recorded By, (t) = Taken By, (c) = Cosigned By      Initials Name Provider Type    EE Katrin Garcia, PT Physical Therapist                   Goals/Plan       Row Name 05/23/24 1504          Bed Mobility Goal 1 (PT)    Activity/Assistive Device (Bed Mobility Goal 1, PT) sit to supine/supine to sit  -EE     [place] Level/Cues Needed (Bed Mobility Goal 1, PT) independent  -EE     Time Frame (Bed Mobility Goal 1, PT) 1 week;long term goal (LTG)  -EE     Progress/Outcomes (Bed Mobility Goal 1, PT) goal ongoing  -EE       Row Name 05/23/24 1504          Transfer Goal 1 (PT)    Activity/Assistive Device (Transfer Goal 1, PT) sit-to-stand/stand-to-sit;bed-to-chair/chair-to-bed;walker, rolling  -EE     [place] Level/Cues Needed (Transfer Goal 1, PT) modified independence  -EE     Time Frame (Transfer Goal 1, PT) 1 week;long term goal (LTG)  -EE     Progress/Outcome (Transfer Goal 1, PT) goal ongoing  -EE       Row Name 05/23/24 1504          Gait Training Goal 1 (PT)    Activity/Assistive Device (Gait Training Goal 1, PT) gait (walking locomotion);assistive device use  -EE     [place] Level (Gait Training Goal 1, PT) standby assist  -EE     Distance (Gait Training Goal 1, PT) 150'  -EE     Time Frame (Gait Training Goal 1,  PT) 1 week;long term goal (LTG)  -EE     Progress/Outcome (Gait Training Goal 1, PT) goal ongoing  -EE       Row Name 05/23/24 1504          Therapy Assessment/Plan (PT)    Planned Therapy Interventions (PT) balance training;bed mobility training;gait training;home exercise program;patient/family education;stair training;strengthening;transfer training;postural re-education;ROM (range of motion)  -EE               User Key  (r) = Recorded By, (t) = Taken By, (c) = Cosigned By      Initials Name Provider Type    EE Katrin Garcia, PT Physical Therapist                   Clinical Impression       Row Name 05/23/24 1501          Pain    Pretreatment Pain Rating 0/10 - no pain  -EE     Posttreatment Pain Rating 0/10 - no pain  -EE       Row Name 05/23/24 1501          Plan of Care Review    Plan of Care Reviewed With patient  -EE     Outcome Evaluation Pt is a 69 yo male who presents from home with generalized weakness, ESRD on HD. Prior to admission, pt was living at home with spouse and independent with mobility using cane. Upon exam, pt demonstrates generalized weakness, impaired balance, and decreased endurance limiting mobility. Pt performed bed mobility with CGA and stood with CGA. Pt ambulated 30' with CGA and rwx; gait distance limited by dizziness and fatigue. Pt will continue to benefit from PT to address impairments and increase independence with mobility. Rec DC to SNF pending progress with PT.  -EE       Row Name 05/23/24 1502          Therapy Assessment/Plan (PT)    Rehab Potential (PT) good, to achieve stated therapy goals  -EE     Criteria for Skilled Interventions Met (PT) yes;meets criteria;skilled treatment is necessary  -EE     Therapy Frequency (PT) 5 times/wk  -EE       Row Name 05/23/24 1506          Positioning and Restraints    Pre-Treatment Position in bed  -EE     Post Treatment Position bed  -EE     In Bed fowlers;call light within reach;encouraged to call for assist;exit alarm on;notified  nsg;legs elevated  -EE               User Key  (r) = Recorded By, (t) = Taken By, (c) = Cosigned By      Initials Name Provider Type    Katrin Bedoya PT Physical Therapist                   Outcome Measures       Row Name 05/23/24 1504 05/23/24 0911       How much help from another person do you currently need...    Turning from your back to your side while in flat bed without using bedrails? 4  -EE 4  -CM    Moving from lying on back to sitting on the side of a flat bed without bedrails? 3  -EE 4  -CM    Moving to and from a bed to a chair (including a wheelchair)? 3  -EE 3  -CM    Standing up from a chair using your arms (e.g., wheelchair, bedside chair)? 3  -EE 3  -CM    Climbing 3-5 steps with a railing? 3  -EE 3  -CM    To walk in hospital room? 3  -EE 3  -CM    AM-PAC 6 Clicks Score (PT) 19  -EE 20  -CM    Highest Level of Mobility Goal 6 --> Walk 10 steps or more  -EE 6 --> Walk 10 steps or more  -CM      Row Name 05/23/24 0800          How much help from another person do you currently need...    Turning from your back to your side while in flat bed without using bedrails? 4  -CM     Moving from lying on back to sitting on the side of a flat bed without bedrails? 4  -CM     Moving to and from a bed to a chair (including a wheelchair)? 3  -CM     Standing up from a chair using your arms (e.g., wheelchair, bedside chair)? 3  -CM     Climbing 3-5 steps with a railing? 3  -CM     To walk in hospital room? 3  -CM     AM-PAC 6 Clicks Score (PT) 20  -CM     Highest Level of Mobility Goal 6 --> Walk 10 steps or more  -CM               User Key  (r) = Recorded By, (t) = Taken By, (c) = Cosigned By      Initials Name Provider Type    Katrin Bedoya PT Physical Therapist    Ernestina Mackey RN Registered Nurse                                 Physical Therapy Education       Title: PT OT SLP Therapies (In Progress)       Topic: Physical Therapy (In Progress)       Point: Mobility training (Done)        Learning Progress Summary             Patient Acceptance, E, VU,NR by EE at 5/23/2024 1505                         Point: Home exercise program (Not Started)       Learner Progress:  Not documented in this visit.              Point: Body mechanics (Not Started)       Learner Progress:  Not documented in this visit.              Point: Precautions (Not Started)       Learner Progress:  Not documented in this visit.                              User Key       Initials Effective Dates Name Provider Type Discipline    EE 06/16/21 -  Katrin Garcia, PT Physical Therapist PT                  PT Recommendation and Plan  Planned Therapy Interventions (PT): balance training, bed mobility training, gait training, home exercise program, patient/family education, stair training, strengthening, transfer training, postural re-education, ROM (range of motion)  Plan of Care Reviewed With: patient  Outcome Evaluation: Pt is a 67 yo male who presents from home with generalized weakness, ESRD on HD. Prior to admission, pt was living at home with spouse and independent with mobility using cane. Upon exam, pt demonstrates generalized weakness, impaired balance, and decreased endurance limiting mobility. Pt performed bed mobility with CGA and stood with CGA. Pt ambulated 30' with CGA and rwx; gait distance limited by dizziness and fatigue. Pt will continue to benefit from PT to address impairments and increase independence with mobility. Rec DC to SNF pending progress with PT.     Time Calculation:         PT Charges       Row Name 05/23/24 1505             Time Calculation    Start Time 1320  -EE      Stop Time 1336  -EE      Time Calculation (min) 16 min  -EE      PT Received On 05/23/24  -EE      PT - Next Appointment 05/24/24  -EE      PT Goal Re-Cert Due Date 05/30/24  -EE         Time Calculation- PT    Total Timed Code Minutes- PT 9 minute(s)  -EE         Timed Charges    03662 - PT Therapeutic Activity Minutes 9  -EE         Total  Minutes    Timed Charges Total Minutes 9  -EE       Total Minutes 9  -EE                User Key  (r) = Recorded By, (t) = Taken By, (c) = Cosigned By      Initials Name Provider Type    EE Katrin Garcia, PT Physical Therapist                  Therapy Charges for Today       Code Description Service Date Service Provider Modifiers Qty    [id number]  PT THERAPEUTIC ACT EA 15 MIN 5/23/2024 Katrin Garcia, PT GP 1    [id number] HC PT EVAL MOD COMPLEXITY 2 5/23/2024 Katrin Garcia, PT GP 1            PT G-Codes  AM-PAC 6 Clicks Score (PT): 19  PT Discharge Summary  Anticipated Discharge Disposition (PT): skilled nursing facility    Katrin Garcia PT  5/23/2024

## 2024-05-23 NOTE — CASE MANAGEMENT/SOCIAL WORK
Discharge Planning Assessment  [facility]     Patient Name: Carlito Mo  MRN: [id number]  Today's Date: 5/23/2024    Admit Date: 5/23/2024    Plan: SNF vs home, will need pre-cert and transportation   Discharge Needs Assessment       Row Name 05/23/24 1026       Living Environment    People in Home spouse    Name(s) of People in Home Lissette Mo [phone]    Current Living Arrangements home    Potentially Unsafe Housing Conditions none    In the past 12 months has the electric, gas, oil, or water company threatened to shut off services in your home? No    Primary Care Provided by self    Provides Primary Care For no one, unable/limited ability to care for self    Family Caregiver if Needed spouse    Family Caregiver Names Lissette Mo    Quality of Family Relationships unable to assess    Able to Return to Prior Arrangements other (see comments)  Pt would like to go to short term rehab       Resource/Environmental Concerns    Resource/Environmental Concerns none    Transportation Concerns none       Transportation Needs    In the past 12 months, has lack of transportation kept you from medical appointments or from getting medications? no    In the past 12 months, has lack of transportation kept you from meetings, work, or from getting things needed for daily living? No       Food Insecurity    Within the past 12 months, you worried that your food would run out before you got the money to buy more. Never true    Within the past 12 months, the food you bought just didn't last and you didn't have money to get more. Never true       Transition Planning    Patient/Family Anticipates Transition to other (see comments)  SNF    Patient/Family Anticipated Services at Transition skilled nursing    Transportation Anticipated health plan transportation       Discharge Needs Assessment    Readmission Within the Last 30 Days no previous admission in last 30 days    Current Outpatient/Agency/Support Group outpatient hemodialysis     Equipment Currently Used at Home cane, straight;walker, rolling    Concerns to be Addressed no discharge needs identified;denies needs/concerns at this time    Anticipated Changes Related to Illness none    Equipment Needed After Discharge none    Outpatient/Agency/Support Group Needs skilled nursing facility                   Discharge Plan       Row Name 05/23/24 1029       Plan    Plan SNF vs home, will need pre-cert and transportation    Plan Comments Met with pt at bedside. Introduced self and explained role of . Face sheet verified. PCP is Florencia Caballero. Pt denies any difficulty paying for medications, and he obtains his medications from [facility]. Pt stated that his wife is not able to assist with any care needs. Pt is independent in his ADL's, but he stated that he has become very weak and he feels like he needs rehab to become stronger. Pt is on dialysis and he goes every [facility]Sat to [facility]. Pt spouse drives him to dialysis. Pt has had home health in the past, and he has been to [facility]. He is requesting a referral for [facility]. Referral placed, Jean-Paul PADILLA/[facility] aware of referral. Upon discharge, pt will need pre-cert as well as transportation arranged. Explained that CCP would follow to assess for discharge needs.                  Continued Care and Services - Admitted Since 5/23/2024    No active coordination exists for this encounter.       Expected Discharge Date and Time       Expected Discharge Date Expected Discharge Time    May 25, 2024            Demographic Summary    No documentation.                  Functional Status    No documentation.                  Psychosocial    No documentation.                  Abuse/Neglect    No documentation.                  Legal    No documentation.                  Substance Abuse    No documentation.                  Patient Forms    No documentation.                     Heike Cavazos RN

## 2024-05-24 LAB
ALBUMIN SERPL-MCNC: 3.7 G/DL (ref 3.5–5.2)
ANION GAP SERPL CALCULATED.3IONS-SCNC: 10.6 MMOL/L (ref 5–15)
BASOPHILS # BLD AUTO: 0.01 10*3/MM3 (ref 0–0.2)
BASOPHILS NFR BLD AUTO: 0.2 % (ref 0–1.5)
BUN SERPL-MCNC: 48 MG/DL (ref 8–23)
BUN/CREAT SERPL: 14.8 (ref 7–25)
CALCIUM SPEC-SCNC: 8.9 MG/DL (ref 8.6–10.5)
CHLORIDE SERPL-SCNC: 98 MMOL/L (ref 98–107)
CO2 SERPL-SCNC: 27.4 MMOL/L (ref 22–29)
CREAT SERPL-MCNC: 3.24 MG/DL (ref 0.76–1.27)
DEPRECATED RDW RBC AUTO: 44.2 FL (ref 37–54)
EGFRCR SERPLBLD CKD-EPI 2021: 19.9 ML/MIN/1.73
EOSINOPHIL # BLD AUTO: 0.13 10*3/MM3 (ref 0–0.4)
EOSINOPHIL NFR BLD AUTO: 2.5 % (ref 0.3–6.2)
ERYTHROCYTE [DISTWIDTH] IN BLOOD BY AUTOMATED COUNT: 13.3 % (ref 12.3–15.4)
GLUCOSE BLDC GLUCOMTR-MCNC: 106 MG/DL (ref 70–130)
GLUCOSE BLDC GLUCOMTR-MCNC: 157 MG/DL (ref 70–130)
GLUCOSE BLDC GLUCOMTR-MCNC: 71 MG/DL (ref 70–130)
GLUCOSE SERPL-MCNC: 108 MG/DL (ref 65–99)
HBV SURFACE AB SER RIA-ACNC: NORMAL
HBV SURFACE AG SERPL QL IA: NORMAL
HCT VFR BLD AUTO: 32.1 % (ref 37.5–51)
HGB BLD-MCNC: 10 G/DL (ref 13–17.7)
IMM GRANULOCYTES # BLD AUTO: 0.01 10*3/MM3 (ref 0–0.05)
IMM GRANULOCYTES NFR BLD AUTO: 0.2 % (ref 0–0.5)
LYMPHOCYTES # BLD AUTO: 0.93 10*3/MM3 (ref 0.7–3.1)
LYMPHOCYTES NFR BLD AUTO: 17.6 % (ref 19.6–45.3)
MAGNESIUM SERPL-MCNC: 2 MG/DL (ref 1.6–2.4)
MCH RBC QN AUTO: 27.9 PG (ref 26.6–33)
MCHC RBC AUTO-ENTMCNC: 31.2 G/DL (ref 31.5–35.7)
MCV RBC AUTO: 89.7 FL (ref 79–97)
MONOCYTES # BLD AUTO: 0.52 10*3/MM3 (ref 0.1–0.9)
MONOCYTES NFR BLD AUTO: 9.9 % (ref 5–12)
NEUTROPHILS NFR BLD AUTO: 3.67 10*3/MM3 (ref 1.7–7)
NEUTROPHILS NFR BLD AUTO: 69.6 % (ref 42.7–76)
NRBC BLD AUTO-RTO: 0 /100 WBC (ref 0–0.2)
PHOSPHATE SERPL-MCNC: 4.3 MG/DL (ref 2.5–4.5)
PLATELET # BLD AUTO: 229 10*3/MM3 (ref 140–450)
PMV BLD AUTO: 10.2 FL (ref 6–12)
POTASSIUM SERPL-SCNC: 4.7 MMOL/L (ref 3.5–5.2)
RBC # BLD AUTO: 3.58 10*6/MM3 (ref 4.14–5.8)
SODIUM SERPL-SCNC: 136 MMOL/L (ref 136–145)
WBC NRBC COR # BLD AUTO: 5.27 10*3/MM3 (ref 3.4–10.8)

## 2024-05-24 PROCEDURE — G0378 HOSPITAL OBSERVATION PER HR: HCPCS

## 2024-05-24 PROCEDURE — 82948 REAGENT STRIP/BLOOD GLUCOSE: CPT

## 2024-05-24 PROCEDURE — G0257 UNSCHED DIALYSIS ESRD PT HOS: HCPCS

## 2024-05-24 PROCEDURE — 87340 HEPATITIS B SURFACE AG IA: CPT | Performed by: STUDENT IN AN ORGANIZED HEALTH CARE EDUCATION/TRAINING PROGRAM

## 2024-05-24 PROCEDURE — 85025 COMPLETE CBC W/AUTO DIFF WBC: CPT

## 2024-05-24 PROCEDURE — 80069 RENAL FUNCTION PANEL: CPT

## 2024-05-24 PROCEDURE — 83735 ASSAY OF MAGNESIUM: CPT

## 2024-05-24 PROCEDURE — 86704 HEP B CORE ANTIBODY TOTAL: CPT | Performed by: STUDENT IN AN ORGANIZED HEALTH CARE EDUCATION/TRAINING PROGRAM

## 2024-05-24 PROCEDURE — 63710000001 INSULIN LISPRO (HUMAN) PER 5 UNITS: Performed by: NURSE PRACTITIONER

## 2024-05-24 PROCEDURE — 86706 HEP B SURFACE ANTIBODY: CPT | Performed by: STUDENT IN AN ORGANIZED HEALTH CARE EDUCATION/TRAINING PROGRAM

## 2024-05-24 RX ADMIN — APIXABAN 2.5 MG: 2.5 TABLET, FILM COATED ORAL at 13:30

## 2024-05-24 RX ADMIN — Medication 10 ML: at 20:31

## 2024-05-24 RX ADMIN — SODIUM BICARBONATE 650 MG: 650 TABLET ORAL at 13:31

## 2024-05-24 RX ADMIN — APIXABAN 2.5 MG: 2.5 TABLET, FILM COATED ORAL at 20:31

## 2024-05-24 RX ADMIN — LEVOTHYROXINE SODIUM 75 MCG: 75 TABLET ORAL at 06:06

## 2024-05-24 RX ADMIN — ROSUVASTATIN CALCIUM 10 MG: 10 TABLET, FILM COATED ORAL at 13:31

## 2024-05-24 RX ADMIN — Medication 10 ML: at 13:33

## 2024-05-24 RX ADMIN — ASPIRIN 81 MG: 81 TABLET, COATED ORAL at 13:31

## 2024-05-24 RX ADMIN — Medication 5000 UNITS: at 13:31

## 2024-05-24 RX ADMIN — PANTOPRAZOLE SODIUM 40 MG: 40 TABLET, DELAYED RELEASE ORAL at 13:36

## 2024-05-24 RX ADMIN — BUPROPION HYDROCHLORIDE 150 MG: 150 TABLET, EXTENDED RELEASE ORAL at 13:31

## 2024-05-24 RX ADMIN — INSULIN LISPRO 2 UNITS: 100 INJECTION, SOLUTION INTRAVENOUS; SUBCUTANEOUS at 18:07

## 2024-05-24 RX ADMIN — PANTOPRAZOLE SODIUM 40 MG: 40 TABLET, DELAYED RELEASE ORAL at 18:07

## 2024-05-24 NOTE — PROGRESS NOTES
Name: Carlito Mo ADMIT: 2024   : 1955  PCP: Florencia Caballero MD    MRN: [id number] LOS: 1 days   AGE/SEX: 69 y.o. male  ROOM: [facility]     Subjective   Subjective   Patient seen during hemodialysis.  Patient without any fevers, chills, dyspnea, chest pain, nausea, vomiting, abdominal pain.  Is feeling tired.  Did not eat breakfast prior to hemodialysis.    Review of Systems  As above     Objective   Objective   Vital Signs  Temp:  [97.3 °F (36.3 °C)-98 °F (36.7 °C)] 98 °F (36.7 °C)  Heart Rate:  [61-78] 77  Resp:  [16-18] 18  BP: (111-148)/(54-94) 146/74  SpO2:  [98 %-100 %] 98 %  on   ;   Device (Oxygen Therapy): room air  Body mass index is 19.25 kg/m².  Physical Exam  Vitals and nursing note reviewed.   Constitutional:       General: He is not in acute distress.  Cardiovascular:      Rate and Rhythm: Normal rate.  Regular rhythm.  Pulmonary:      Effort: Pulmonary effort is normal. No respiratory distress.   Abdominal:      General: Abdomen is flat. There is no distension.      Tenderness: There is no abdominal tenderness.   Musculoskeletal:         General: No swelling or deformity.   Skin:     General: Skin is warm and dry.   Neurological:      General: No focal deficit present.      Mental Status: He is alert. Mental status is at baseline.   Results Review     I reviewed the patient's new clinical results.  Results from last 7 days   Lab Units 24  0304 24  0846 24  0249   WBC 10*3/mm3 5.27 6.05 7.37   HEMOGLOBIN g/dL 10.0* 10.8* 11.9*   PLATELETS 10*3/mm3 229 223 242     Results from last 7 days   Lab Units 24  0304 24  0846 24  0249   SODIUM mmol/L 136 138 138   POTASSIUM mmol/L 4.7 4.3 4.6   CHLORIDE mmol/L 98 99 96*   CO2 mmol/L 27.4 25.9 30.0*   BUN mg/dL 48* 33* 31*   CREATININE mg/dL 3.24* 2.63* 2.36*   GLUCOSE mg/dL 108* 134* 203*   Estimated Creatinine Clearance: 19.6 mL/min (A) (by C-G formula based on SCr of 3.24 mg/dL (H)).  Results from last 7  days   Lab Units 05/24/24  0304 05/23/24  0249   ALBUMIN g/dL 3.7 4.7   BILIRUBIN mg/dL  --  0.2   ALK PHOS U/L  --  101   AST (SGOT) U/L  --  11   ALT (SGPT) U/L  --  11     Results from last 7 days   Lab Units 05/24/24  0304 05/23/24  0846 05/23/24  0249   CALCIUM mg/dL 8.9 9.0 9.3   ALBUMIN g/dL 3.7  --  4.7   MAGNESIUM mg/dL 2.0  --   --    PHOSPHORUS mg/dL 4.3  --   --      Results from last 7 days   Lab Units 05/23/24  0249   PROCALCITONIN ng/mL 0.13   LACTATE mmol/L 1.1     COVID19   Date Value Ref Range Status   05/23/2024 Not Detected Not Detected - Ref. Range Final   01/04/2024 Not Detected Not Detected - Ref. Range Final     Glucose   Date/Time Value Ref Range Status   05/24/2024 0732 106 70 - 130 mg/dL Final   05/23/2024 2101 72 70 - 130 mg/dL Final   05/23/2024 1622 168 (H) 70 - 130 mg/dL Final   05/23/2024 1203 245 (H) 70 - 130 mg/dL Final   05/23/2024 0721 135 (H) 70 - 130 mg/dL Final       XR Chest 1 View  Narrative: Patient: ECOTR SANCHEZ  Time Out: 05:00  Exam(s): XR CXR 1 VIEW     EXAM:    XR Chest, 1 View    CLINICAL HISTORY:     Reason for exam: soa.    TECHNIQUE:    Frontal view of the chest.    COMPARISON:  Single view of the chest January 4 2024     IMPRESSION:  1.  Unchanged sternotomy changes.  2.  No acute cardiopulmonary abnormality.  2.  Chronic left posterior sixth rib fracture.  Impression: Electronically signed by Eliseo Marcus MD on 05-23-24 at 0500    I reviewed the patient's daily medications.  Scheduled Medications  apixaban, 2.5 mg, Oral, Q12H  aspirin, 81 mg, Oral, Daily  buPROPion XL, 150 mg, Oral, Daily  cholecalciferol, 5,000 Units, Oral, Daily  insulin lispro, 2-7 Units, Subcutaneous, 4x Daily AC & at Bedtime  levothyroxine, 75 mcg, Oral, Daily  pantoprazole, 40 mg, Oral, BID AC  rosuvastatin, 10 mg, Oral, Daily  sodium bicarbonate, 650 mg, Oral, TID  sodium chloride, 10 mL, Intravenous, Q12H    Infusions   Diet  Diet: Diabetic; Consistent Carbohydrate; Fluid Consistency:  Thin (IDDSI 0)         I have personally reviewed:  [x]  Laboratory   []  Microbiology   []  Radiology   []  EKG/Telemetry   []  Cardiology/Vascular   []  Pathology   []  Records     Assessment/Plan     Active Hospital Problems    Diagnosis  POA   • **Generalized weakness [R53.1]  Yes   • Exertional dyspnea [R06.09]  Yes   • ESRD (end stage renal disease) [N18.6]  Yes   • Acute HFrEF (heart failure with reduced ejection fraction) [I50.21]  Yes   • History of stroke [Z86.73]  Not Applicable   • Paroxysmal atrial fibrillation [I48.0]  Yes   • Essential hypertension [I10]  Yes   • Coronary artery disease involving native coronary artery of native heart without angina pectoris [I25.10]  Yes   • Type 2 diabetes mellitus, with long-term current use of insulin [E11.9, Z79.4]  Not Applicable      Resolved Hospital Problems   No resolved problems to display.       69 y.o. male admitted with Generalized weakness.    Paroxysmal atrial fibrillation  Chronic systolic heart failure  Coronary artery disease  -No evidence of infection.  RVP negative, chest x-ray with no infiltrate or edema.  -BNP and troponin elevated, likely from ESRD, chest x-ray without any evidence of edema no lower extremity edema  -Discussed with Dr. Badillo, patient is not in atrial fibrillation at this time  -Continue Coreg, Eliquis, rosuvastatin, aspirin     Hypothyroidism-continue home Synthroid     GERD-continue home PPI     Diabetes type 2, A1c 6.9%  -Patient states he was not taking any insulin at home  -Low-dose sliding scale insulin     ESRD  -Nephrology consulted  -Continue sodium bicarb    Generalized weakness  -Likely from missed hemodialysis appointment   -SNF pending    Eliquis (home med) for DVT prophylaxis.  Full code.  Discussed with patient, nursing staff, CCP, and care team on multidisciplinary rounds.  Anticipate discharge to SNU facility once arrangements have been made.    Expected Discharge Date: 5/25/2024; Expected Discharge Time:        Nehemias Rodriguez MD  [facility]  05/24/24  11:12 EDT

## 2024-05-24 NOTE — PLAN OF CARE
Goal Outcome Evaluation:  Plan of Care Reviewed With: patient  VSS  Pt had dialysis today-no complaints of pain  Accucks as noted  Safety maintained  Plan is for patient to be discharged tomorrow to rehab center  Patient and Wife informed of plan of care        Progress: improving

## 2024-05-24 NOTE — CASE MANAGEMENT/SOCIAL WORK
Post-Acute Authorization Submission      Post Acute Pre-Cert Documentation  Request Submitted by Facility - Type:: Hospital  Post-Acute Authorization Type Submitted:: SNF  Date Post Acute Pre-Cert Inititated per Facility: 05/24/24  Date Post Acute Pre-Cert Completed: 05/24/24  Accepting Facility: [facility] Discharge Date Requested: 05/24/24  All Clinicals Submitted?: Yes  Had Accepting Facility at Time of Submission: Yes  Response Received from Insurance?: Approval  Response Communicated to:: , Accepting Facility Liaison, Accepting Facility Auth Department  Authorization Number:: [id number]  Post Acute Pre-Cert Initiated Comment: VALID TO ADMIT UPTO 11:59PM ON 5/31/2024.              Keny Milner, PCT

## 2024-05-24 NOTE — CASE MANAGEMENT/SOCIAL WORK
Continued Stay Note  [facility]     Patient Name: Carlito Mo  MRN: [id number]  Today's Date: 5/24/2024    Admit Date: 5/23/2024    Plan: DC to [facility], will need transportation   Discharge Plan       Row Name 05/24/24 1634       Plan    Plan DC to [facility], will need transportation    Plan Comments Pre-cert has been obtained for [facility]. Waiting for clearance for dialysis chair. If pt should be dc over the weekend, please call report to [phone]. Please fax dc summary to [phone]. Pt will need transportation arranged. Packet at nurses station.                   Discharge Codes    No documentation.                 Expected Discharge Date and Time       Expected Discharge Date Expected Discharge Time    May 27, 2024               Heike Cavazos RN

## 2024-05-24 NOTE — PROGRESS NOTES
[facility] Progress Note      Patient Name: Carlito Mo  : 1955  MRN: [id number]  Primary Care Physician:  Florencia Caballero MD  Date of admission: 2024    Subjective     Interval History:   HD earlier today; 2 L removed; blood pressure stable  He feels very tired; minimally verbal; appetite is poor  No shortness of breath on room air    Review of Systems:   As noted above    Objective     Vitals:   Temp:  [97.3 °F (36.3 °C)-98 °F (36.7 °C)] 97.3 °F (36.3 °C)  Heart Rate:  [61-78] 78  Resp:  [16-18] 16  BP: (101-148)/(60-94) 101/67    Intake/Output Summary (Last 24 hours) at 2024 1521  Last data filed at 2024 1314  Gross per 24 hour   Intake 560 ml   Output 2925 ml   Net -2365 ml       Physical Exam:    Constitutional: Awake, alert, NAD, chronically ill-appearing.  HEENT: Sclera anicteric, no conjunctival injection  Neck: Supple, no JVD  Respiratory: Diminished to auscultation bilaterally, conversational dyspnea  Cardiovascular: Irregular rhythm, no rub   Gastrointestinal: BS +, soft, nontender and nondistended  : No palpable bladder  Musculoskeletal: No edema, no clubbing or cyanosis  Psychiatric: Flat affect, depressed mood, cooperative, oriented  Neurologic: moving all extremities, slow responses  Skin: Warm and dry      Scheduled Meds:     apixaban, 2.5 mg, Oral, Q12H  aspirin, 81 mg, Oral, Daily  buPROPion XL, 150 mg, Oral, Daily  cholecalciferol, 5,000 Units, Oral, Daily  insulin lispro, 2-7 Units, Subcutaneous, 4x Daily AC & at Bedtime  levothyroxine, 75 mcg, Oral, Daily  pantoprazole, 40 mg, Oral, BID AC  rosuvastatin, 10 mg, Oral, Daily  sodium bicarbonate, 650 mg, Oral, TID  sodium chloride, 10 mL, Intravenous, Q12H      IV Meds:        Results Reviewed:   I have personally reviewed the results from the time of this admission to 2024 15:21 EDT     Results from last 7 days   Lab Units 24  0304 24  0846 24  0249   SODIUM mmol/L  136 138 138   POTASSIUM mmol/L 4.7 4.3 4.6   CHLORIDE mmol/L 98 99 96*   CO2 mmol/L 27.4 25.9 30.0*   BUN mg/dL 48* 33* 31*   CREATININE mg/dL 3.24* 2.63* 2.36*   CALCIUM mg/dL 8.9 9.0 9.3   BILIRUBIN mg/dL  --   --  0.2   ALK PHOS U/L  --   --  101   ALT (SGPT) U/L  --   --  11   AST (SGOT) U/L  --   --  11   GLUCOSE mg/dL 108* 134* 203*       Estimated Creatinine Clearance: 19.4 mL/min (A) (by C-G formula based on SCr of 3.24 mg/dL (H)).    Results from last 7 days   Lab Units 05/24/24  0304   MAGNESIUM mg/dL 2.0   PHOSPHORUS mg/dL 4.3             Results from last 7 days   Lab Units 05/24/24  0304 05/23/24  0846 05/23/24  0249   WBC 10*3/mm3 5.27 6.05 7.37   HEMOGLOBIN g/dL 10.0* 10.8* 11.9*   PLATELETS 10*3/mm3 229 223 242       Results from last 7 days   Lab Units 05/23/24  0249   INR  1.19*       Assessment / Plan     ASSESSMENT:  ESRD on maintenance HD TTS via right forearm AV fistula.  Volume fine; electrolytes stable.  Had HD earlier today.  Usual schedule, though, is TTS  Hypertension with CKD.  BP is low-normal  Type II diabetes with CKD   Systolic and diastolic CHF/CAD/elevated troponin, echo on 3/8/2023 showed EF 41 to 45% with moderate tricuspid regurg  Anemia with chronic kidney disease, on HAI outpatient  Hypothyroidism, treated    PLAN:  1.  Clean up medications:  stop sodium bicarbonate.  We can further adjust dialysate bath to address acid-base problems going forward  2.  Can resume usual schedule for HD (thus next outpatient HD would be 5/28; I think he will be fine from volume and electrolyte standpoint to wait until then for next treatment)  3.  If he is kept as an inpatient over the weekend, though, will do short treatment 5/26 to serve as a bridge until next outpatient treatment  4.  Nursing home anytime from [facility]    Thank you for involving us in the care of Carlito Mo.  Please feel free to call with any questions.    Ramakrishna Blake MD  05/24/24  15:21 EDT    Nephrology  [facility]  [phone]    Please note that portions of this note were completed with a voice recognition program.

## 2024-05-25 LAB
GLUCOSE BLDC GLUCOMTR-MCNC: 101 MG/DL (ref 70–130)
GLUCOSE BLDC GLUCOMTR-MCNC: 118 MG/DL (ref 70–130)
GLUCOSE BLDC GLUCOMTR-MCNC: 210 MG/DL (ref 70–130)
GLUCOSE BLDC GLUCOMTR-MCNC: 226 MG/DL (ref 70–130)
HBV CORE AB SERPL QL IA: NEGATIVE

## 2024-05-25 PROCEDURE — G0378 HOSPITAL OBSERVATION PER HR: HCPCS

## 2024-05-25 PROCEDURE — 82948 REAGENT STRIP/BLOOD GLUCOSE: CPT

## 2024-05-25 PROCEDURE — 63710000001 INSULIN LISPRO (HUMAN) PER 5 UNITS: Performed by: NURSE PRACTITIONER

## 2024-05-25 RX ADMIN — ROSUVASTATIN CALCIUM 10 MG: 10 TABLET, FILM COATED ORAL at 08:47

## 2024-05-25 RX ADMIN — APIXABAN 2.5 MG: 2.5 TABLET, FILM COATED ORAL at 21:09

## 2024-05-25 RX ADMIN — Medication 10 ML: at 08:47

## 2024-05-25 RX ADMIN — PANTOPRAZOLE SODIUM 40 MG: 40 TABLET, DELAYED RELEASE ORAL at 08:47

## 2024-05-25 RX ADMIN — APIXABAN 2.5 MG: 2.5 TABLET, FILM COATED ORAL at 08:47

## 2024-05-25 RX ADMIN — LEVOTHYROXINE SODIUM 75 MCG: 75 TABLET ORAL at 08:47

## 2024-05-25 RX ADMIN — PANTOPRAZOLE SODIUM 40 MG: 40 TABLET, DELAYED RELEASE ORAL at 16:51

## 2024-05-25 RX ADMIN — INSULIN LISPRO 3 UNITS: 100 INJECTION, SOLUTION INTRAVENOUS; SUBCUTANEOUS at 21:08

## 2024-05-25 RX ADMIN — INSULIN LISPRO 3 UNITS: 100 INJECTION, SOLUTION INTRAVENOUS; SUBCUTANEOUS at 12:17

## 2024-05-25 RX ADMIN — Medication 10 ML: at 21:09

## 2024-05-25 RX ADMIN — BUPROPION HYDROCHLORIDE 150 MG: 150 TABLET, EXTENDED RELEASE ORAL at 08:47

## 2024-05-25 RX ADMIN — Medication 5000 UNITS: at 12:16

## 2024-05-25 RX ADMIN — ASPIRIN 81 MG: 81 TABLET, COATED ORAL at 08:47

## 2024-05-25 NOTE — PROGRESS NOTES
[facility] Progress Note      Patient Name: Carlito Mo  : 1955  MRN: [id number]  Primary Care Physician:  Florencia Caballero MD  Date of admission: 2024    Subjective     Interval History:   Follow-up ESRD.  2 L off with dialysis yesterday.  Ate breakfast.  Waiting on word about dialysis bed at [facility].    Review of Systems:   As noted above    Objective     Vitals:   Temp:  [97.3 °F (36.3 °C)-98.1 °F (36.7 °C)] 97.5 °F (36.4 °C)  Heart Rate:  [78-80] 80  Resp:  [16-20] 20  BP: (101-158)/(62-71) 158/62    Intake/Output Summary (Last 24 hours) at 2024 0923  Last data filed at 2024 0540  Gross per 24 hour   Intake 660 ml   Output 2325 ml   Net -1665 ml       Physical Exam:    Constitutional: Awake, alert, NAD, flat affect.  Chronically ill-appearing.  HEENT: Oral mucosa moist.  Neck: Supple, no JVD  Heart: Irregular rhythm, no rub   Lungs: Clear to auscultation bilaterally.  Abdomen: BS +, soft, nontender and nondistended  : No palpable bladder  Extremities.  No muscle mass.  Right upper extremity AV fistula patent  Neurologic: moving all extremities, slow responses but appropriate.  Skin: Warm and dry      Scheduled Meds:     apixaban, 2.5 mg, Oral, Q12H  aspirin, 81 mg, Oral, Daily  buPROPion XL, 150 mg, Oral, Daily  cholecalciferol, 5,000 Units, Oral, Daily  insulin lispro, 2-7 Units, Subcutaneous, 4x Daily AC & at Bedtime  levothyroxine, 75 mcg, Oral, Daily  pantoprazole, 40 mg, Oral, BID AC  rosuvastatin, 10 mg, Oral, Daily  sodium chloride, 10 mL, Intravenous, Q12H      IV Meds:        Results Reviewed:   I have personally reviewed the results from the time of this admission to 2024 09:23 EDT     Results from last 7 days   Lab Units 24  0304 24  0846 24  0249   SODIUM mmol/L 136 138 138   POTASSIUM mmol/L 4.7 4.3 4.6   CHLORIDE mmol/L 98 99 96*   CO2 mmol/L 27.4 25.9 30.0*   BUN mg/dL 48* 33* 31*   CREATININE mg/dL 3.24* 2.63*  2.36*   CALCIUM mg/dL 8.9 9.0 9.3   BILIRUBIN mg/dL  --   --  0.2   ALK PHOS U/L  --   --  101   ALT (SGPT) U/L  --   --  11   AST (SGOT) U/L  --   --  11   GLUCOSE mg/dL 108* 134* 203*       Estimated Creatinine Clearance: 19.4 mL/min (A) (by C-G formula based on SCr of 3.24 mg/dL (H)).    Results from last 7 days   Lab Units 05/24/24  0304   MAGNESIUM mg/dL 2.0   PHOSPHORUS mg/dL 4.3             Results from last 7 days   Lab Units 05/24/24  0304 05/23/24  0846 05/23/24  0249   WBC 10*3/mm3 5.27 6.05 7.37   HEMOGLOBIN g/dL 10.0* 10.8* 11.9*   PLATELETS 10*3/mm3 229 223 242       Results from last 7 days   Lab Units 05/23/24  0249   INR  1.19*       Assessment / Plan     ASSESSMENT:  ESRD on maintenance HD TTS via right forearm AV fistula.  Euvolemic.  Dialyzed yesterday.  Usual schedule TTS. If he is not discharged today we will dialyze him [place].  Hypertension with CKD.  BP is low-normal  Type II diabetes with CKD   Chronic combined heart failure.  CAD/elevated troponin, echo on 3/8/2023 showed EF 41 to 45% with moderate tricuspid regurg  Anemia with chronic kidney disease, on HAI outpatient  Hypothyroidism on replacement.    PLAN:  1.  Will plan dialysis tomorrow if he is not discharged today.  2.  Okay for [facility] when bed available for dialysis.    Thank you for involving us in the care of Carlito Mo.  Please feel free to call with any questions.    Zahida Hsu MD  05/25/24  09:23 EDT    [facility]  [phone]    Please note that portions of this note were completed with a voice recognition program.

## 2024-05-25 NOTE — PLAN OF CARE
Goal Outcome Evaluation:  Plan of Care Reviewed With: patient        Progress: improving  Outcome Evaluation: Pt has been AOX4. Flat effect or quiet. VSS. On room air this shift. Back and worth between Afib and SR. No c/o pain or discomfort. Plan is to go to [facility] waiting on clearance for HD chair. Packed in cubbie. Pt cooperative for all care. Calls out appropriatly. CTM, safety maintianed.

## 2024-05-25 NOTE — PROGRESS NOTES
Name: Carlito Mo ADMIT: 2024   : 1955  PCP: Florencia Caballero MD    MRN: [id number] LOS: 1 days   AGE/SEX: 69 y.o. male  ROOM: [facility]     Subjective   Subjective   No acute events. Patient denies new complaints. Taking PO.   No family at bedside.    Objective   Objective   Vital Signs  Temp:  [97.3 °F (36.3 °C)-98.1 °F (36.7 °C)] 97.5 °F (36.4 °C)  Heart Rate:  [78-80] 80  Resp:  [16-20] 20  BP: (101-158)/(62-67) 158/62  SpO2:  [99 %-100 %] 100 %  on   ;   Device (Oxygen Therapy): room air  Body mass index is 19.04 kg/m².  Physical Exam  Vitals and nursing note reviewed.   Constitutional:       General: He is not in acute distress.     Appearance: He is ill-appearing (chronically). He is not toxic-appearing or diaphoretic.   HENT:      Head: Normocephalic and atraumatic.      Nose: Nose normal.      Mouth/Throat:      Mouth: Mucous membranes are moist.      Pharynx: Oropharynx is clear.   Eyes:      Conjunctiva/sclera: Conjunctivae normal.      Pupils: Pupils are equal, round, and reactive to light.   Cardiovascular:      Rate and Rhythm: Normal rate and regular rhythm.      Pulses: Normal pulses.   Pulmonary:      Effort: Pulmonary effort is normal.      Breath sounds: Normal breath sounds.   Abdominal:      General: Bowel sounds are normal.      Palpations: Abdomen is soft.      Tenderness: There is no abdominal tenderness.   Musculoskeletal:         General: No swelling or tenderness.      Cervical back: Neck supple.   Skin:     General: Skin is warm and dry.      Capillary Refill: Capillary refill takes less than 2 seconds.   Neurological:      General: No focal deficit present.      Mental Status: He is alert.   Psychiatric:         Mood and Affect: Mood normal. Affect is flat.         Behavior: Behavior normal.       Results Review     I reviewed the patient's new clinical results.  Results from last 7 days   Lab Units 24  0304 24  0846 24  0249   WBC 10*3/mm3 5.27 6.05  7.37   HEMOGLOBIN g/dL 10.0* 10.8* 11.9*   PLATELETS 10*3/mm3 229 223 242     Results from last 7 days   Lab Units 05/24/24  0304 05/23/24  0846 05/23/24  0249   SODIUM mmol/L 136 138 138   POTASSIUM mmol/L 4.7 4.3 4.6   CHLORIDE mmol/L 98 99 96*   CO2 mmol/L 27.4 25.9 30.0*   BUN mg/dL 48* 33* 31*   CREATININE mg/dL 3.24* 2.63* 2.36*   GLUCOSE mg/dL 108* 134* 203*   EGFR mL/min/1.73 19.9* 25.7* 29.3*     Results from last 7 days   Lab Units 05/24/24  0304 05/23/24  0249   ALBUMIN g/dL 3.7 4.7   BILIRUBIN mg/dL  --  0.2   ALK PHOS U/L  --  101   AST (SGOT) U/L  --  11   ALT (SGPT) U/L  --  11     Results from last 7 days   Lab Units 05/24/24  0304 05/23/24  0846 05/23/24  0249   CALCIUM mg/dL 8.9 9.0 9.3   ALBUMIN g/dL 3.7  --  4.7   MAGNESIUM mg/dL 2.0  --   --    PHOSPHORUS mg/dL 4.3  --   --      Results from last 7 days   Lab Units 05/23/24  0249   PROCALCITONIN ng/mL 0.13   LACTATE mmol/L 1.1     Glucose   Date/Time Value Ref Range Status   05/25/2024 1150 210 (H) 70 - 130 mg/dL Final   05/25/2024 0829 101 70 - 130 mg/dL Final   05/24/2024 2014 71 70 - 130 mg/dL Final   05/24/2024 1709 157 (H) 70 - 130 mg/dL Final   05/24/2024 0732 106 70 - 130 mg/dL Final   05/23/2024 2101 72 70 - 130 mg/dL Final   05/23/2024 1622 168 (H) 70 - 130 mg/dL Final       No radiology results for the last day    I have personally reviewed all medications:  Scheduled Medications  apixaban, 2.5 mg, Oral, Q12H  aspirin, 81 mg, Oral, Daily  buPROPion XL, 150 mg, Oral, Daily  cholecalciferol, 5,000 Units, Oral, Daily  insulin lispro, 2-7 Units, Subcutaneous, 4x Daily AC & at Bedtime  levothyroxine, 75 mcg, Oral, Daily  pantoprazole, 40 mg, Oral, BID AC  rosuvastatin, 10 mg, Oral, Daily  sodium chloride, 10 mL, Intravenous, Q12H    Infusions   Diet  Diet: Diabetic; Consistent Carbohydrate; Fluid Consistency: Thin (IDDSI 0)    I have personally reviewed:  [x]  Laboratory   [x]  Microbiology   [x]  Radiology   [x]  EKG/Telemetry  []   Cardiology/Vascular   []  Pathology    [x]  Records    Assessment/Plan     Active Hospital Problems    Diagnosis  POA    **Generalized weakness [R53.1]  Yes    Exertional dyspnea [R06.09]  Yes    ESRD (end stage renal disease) [N18.6]  Yes    Acute HFrEF (heart failure with reduced ejection fraction) [I50.21]  Yes    History of stroke [Z86.73]  Not Applicable    Paroxysmal atrial fibrillation [I48.0]  Yes    Essential hypertension [I10]  Yes    Coronary artery disease involving native coronary artery of native heart without angina pectoris [I25.10]  Yes    Type 2 diabetes mellitus, with long-term current use of insulin [E11.9, Z79.4]  Not Applicable      Resolved Hospital Problems   No resolved problems to display.   Generalized weakness  - likely multifactorial from deconditioning, chronic illness, and missed dialysis  - no evidence of infection or other metabolic derangement   - PT following, awaiting SNF placement    PAF/CAD/Chronic Systolic CHF  - HR acceptable, no anginal symptoms or evidence of ACS, volume status acceptable  - continue ASA, crestor, coreg, and eliquis  - appreciate cardiology evaluation     Hypothyroidism  - TSH is borderline elevated  - continue levothyroxine at current dose  - follow up TSH in 4-6 weeks as outpatient     GERD  - symptoms controlled  - continue PPI     Type 2 DM  - BG acceptable, A1c 6.9%  - complications include nephropathy/ESRD  - patient denies taking antihyperglycemics at home  - continue on correctional insulin     ESRD  - continue HD per nephrology, appreciate recs      Eliquis (home med) for DVT prophylaxis.  Full code.  Discussed with patient and nursing staff.  Anticipate discharge to SNU facility once arrangements have been made.  Expected Discharge Date: 5/27/2024; Expected Discharge Time:       Eliseo Gross MD  [facility]  05/25/24  12:10 EDT

## 2024-05-26 LAB
ALBUMIN SERPL-MCNC: 3.7 G/DL (ref 3.5–5.2)
ANION GAP SERPL CALCULATED.3IONS-SCNC: 10.5 MMOL/L (ref 5–15)
BUN SERPL-MCNC: 56 MG/DL (ref 8–23)
BUN/CREAT SERPL: 13.7 (ref 7–25)
CALCIUM SPEC-SCNC: 8.9 MG/DL (ref 8.6–10.5)
CHLORIDE SERPL-SCNC: 98 MMOL/L (ref 98–107)
CO2 SERPL-SCNC: 25.5 MMOL/L (ref 22–29)
CREAT SERPL-MCNC: 4.1 MG/DL (ref 0.76–1.27)
EGFRCR SERPLBLD CKD-EPI 2021: 15 ML/MIN/1.73
GLUCOSE BLDC GLUCOMTR-MCNC: 122 MG/DL (ref 70–130)
GLUCOSE BLDC GLUCOMTR-MCNC: 131 MG/DL (ref 70–130)
GLUCOSE BLDC GLUCOMTR-MCNC: 146 MG/DL (ref 70–130)
GLUCOSE BLDC GLUCOMTR-MCNC: 184 MG/DL (ref 70–130)
GLUCOSE SERPL-MCNC: 114 MG/DL (ref 65–99)
PHOSPHATE SERPL-MCNC: 5 MG/DL (ref 2.5–4.5)
POTASSIUM SERPL-SCNC: 5.1 MMOL/L (ref 3.5–5.2)
SODIUM SERPL-SCNC: 134 MMOL/L (ref 136–145)

## 2024-05-26 PROCEDURE — 80069 RENAL FUNCTION PANEL: CPT | Performed by: INTERNAL MEDICINE

## 2024-05-26 PROCEDURE — G0257 UNSCHED DIALYSIS ESRD PT HOS: HCPCS

## 2024-05-26 PROCEDURE — G0378 HOSPITAL OBSERVATION PER HR: HCPCS

## 2024-05-26 PROCEDURE — 82948 REAGENT STRIP/BLOOD GLUCOSE: CPT

## 2024-05-26 PROCEDURE — 63710000001 INSULIN LISPRO (HUMAN) PER 5 UNITS: Performed by: NURSE PRACTITIONER

## 2024-05-26 PROCEDURE — 97530 THERAPEUTIC ACTIVITIES: CPT

## 2024-05-26 RX ADMIN — BUPROPION HYDROCHLORIDE 150 MG: 150 TABLET, EXTENDED RELEASE ORAL at 13:27

## 2024-05-26 RX ADMIN — ASPIRIN 81 MG: 81 TABLET, COATED ORAL at 13:27

## 2024-05-26 RX ADMIN — LEVOTHYROXINE SODIUM 75 MCG: 75 TABLET ORAL at 13:27

## 2024-05-26 RX ADMIN — INSULIN LISPRO 2 UNITS: 100 INJECTION, SOLUTION INTRAVENOUS; SUBCUTANEOUS at 16:59

## 2024-05-26 RX ADMIN — APIXABAN 2.5 MG: 2.5 TABLET, FILM COATED ORAL at 13:28

## 2024-05-26 RX ADMIN — Medication 10 ML: at 13:28

## 2024-05-26 RX ADMIN — Medication 5000 UNITS: at 13:27

## 2024-05-26 RX ADMIN — PANTOPRAZOLE SODIUM 40 MG: 40 TABLET, DELAYED RELEASE ORAL at 16:59

## 2024-05-26 RX ADMIN — Medication 10 ML: at 20:52

## 2024-05-26 RX ADMIN — ROSUVASTATIN CALCIUM 10 MG: 10 TABLET, FILM COATED ORAL at 13:27

## 2024-05-26 RX ADMIN — APIXABAN 2.5 MG: 2.5 TABLET, FILM COATED ORAL at 20:52

## 2024-05-26 NOTE — PROGRESS NOTES
[facility] Progress Note      Patient Name: Carlito Mo  : 1955  MRN: [id number]  Primary Care Physician:  Florencia Caballero MD  Date of admission: 2024    Subjective     Interval History:   Follow-up ESRD.  Slept fair.  Hungry.  Blood pressure stable.  No fever.  Bowels moving.  Review of Systems:   As noted above    Objective     Vitals:   Temp:  [97.3 °F (36.3 °C)-98.1 °F (36.7 °C)] 97.5 °F (36.4 °C)  Heart Rate:  [64-80] 64  Resp:  [18-20] 18  BP: (121-158)/(58-72) 140/72    Intake/Output Summary (Last 24 hours) at 2024 0716  Last data filed at 2024 0223  Gross per 24 hour   Intake --   Output 600 ml   Net -600 ml       Physical Exam:    Constitutional: Sleeping but awakens easily. Flat affect.  Chronically ill-appearing.  HEENT: Oral mucosa dry  Neck: Supple, no JVD  Heart: Irregular rhythm, no rub   Lungs: Clear to auscultation bilaterally.  Abdomen: BS +, soft, nontender and nondistended  : No palpable bladder  Extremities.  No muscle mass.  Right upper extremity AV fistula patent  Neurologic: moving all extremities, slow responses but appropriate.  Skin: Warm and dry      Scheduled Meds:     apixaban, 2.5 mg, Oral, Q12H  aspirin, 81 mg, Oral, Daily  buPROPion XL, 150 mg, Oral, Daily  cholecalciferol, 5,000 Units, Oral, Daily  insulin lispro, 2-7 Units, Subcutaneous, 4x Daily AC & at Bedtime  levothyroxine, 75 mcg, Oral, Daily  pantoprazole, 40 mg, Oral, BID AC  rosuvastatin, 10 mg, Oral, Daily  sodium chloride, 10 mL, Intravenous, Q12H      IV Meds:        Results Reviewed:   I have personally reviewed the results from the time of this admission to 2024 07:16 EDT     Results from last 7 days   Lab Units 24  0611 24  0304 24  0846 24  0249   SODIUM mmol/L 134* 136 138 138   POTASSIUM mmol/L 5.1 4.7 4.3 4.6   CHLORIDE mmol/L 98 98 99 96*   CO2 mmol/L 25.5 27.4 25.9 30.0*   BUN mg/dL 56* 48* 33* 31*   CREATININE mg/dL 4.10*  3.24* 2.63* 2.36*   CALCIUM mg/dL 8.9 8.9 9.0 9.3   BILIRUBIN mg/dL  --   --   --  0.2   ALK PHOS U/L  --   --   --  101   ALT (SGPT) U/L  --   --   --  11   AST (SGOT) U/L  --   --   --  11   GLUCOSE mg/dL 114* 108* 134* 203*       Estimated Creatinine Clearance: 15.5 mL/min (A) (by C-G formula based on SCr of 4.1 mg/dL (H)).    Results from last 7 days   Lab Units 05/26/24  0611 05/24/24  0304   MAGNESIUM mg/dL  --  2.0   PHOSPHORUS mg/dL 5.0* 4.3             Results from last 7 days   Lab Units 05/24/24  0304 05/23/24  0846 05/23/24  0249   WBC 10*3/mm3 5.27 6.05 7.37   HEMOGLOBIN g/dL 10.0* 10.8* 11.9*   PLATELETS 10*3/mm3 229 223 242       Results from last 7 days   Lab Units 05/23/24  0249   INR  1.19*       Assessment / Plan     ASSESSMENT:  ESRD on maintenance HD TTS via right forearm AV fistula.  Euvolemic.  Dialyzed last on Friday.   Usual schedule TTS. Plan dialysis today .  Hypertension with CKD. Controlled.  Type II diabetes with CKD   Chronic combined heart failure.  CAD/elevated troponin, echo on 3/8/2023 showed EF 41 to 45% with moderate tricuspid regurg  Anemia with chronic kidney disease, on HAI outpatient  Hypothyroidism on replacement.    PLAN:  1.  Dialysis today .  2.  Okay for [facility] when bed available for dialysis.    Thank you for involving us in the care of Carlito Mo.  Please feel free to call with any questions.    Zahida Hsu MD  05/26/24  07:16 EDT    [facility]  [phone]    Please note that portions of this note were completed with a voice recognition program.

## 2024-05-26 NOTE — PROGRESS NOTES
Name: Carlito Mo ADMIT: 2024   : 1955  PCP: Florencia Caballero MD    MRN: [id number] LOS: 1 days   AGE/SEX: 69 y.o. male  ROOM: [facility]     Subjective   Subjective   No acute events. Patient denies new complaints. Taking PO.   No family at bedside.    Objective   Objective   Vital Signs  Temp:  [97.5 °F (36.4 °C)-98.1 °F (36.7 °C)] 97.7 °F (36.5 °C)  Heart Rate:  [57-68] 68  Resp:  [16-18] 18  BP: (121-140)/(58-72) 121/59  SpO2:  [99 %-100 %] 100 %  on   ;   Device (Oxygen Therapy): room air  Body mass index is 19.31 kg/m².  Physical Exam  Vitals and nursing note reviewed.   Constitutional:       General: He is not in acute distress.     Appearance: He is ill-appearing (chronically). He is not toxic-appearing or diaphoretic.   HENT:      Head: Normocephalic and atraumatic.      Nose: Nose normal.      Mouth/Throat:      Mouth: Mucous membranes are moist.      Pharynx: Oropharynx is clear.   Eyes:      Conjunctiva/sclera: Conjunctivae normal.      Pupils: Pupils are equal, round, and reactive to light.   Cardiovascular:      Rate and Rhythm: Normal rate and regular rhythm.      Pulses: Normal pulses.   Pulmonary:      Effort: Pulmonary effort is normal.      Breath sounds: Normal breath sounds.   Abdominal:      General: Bowel sounds are normal.      Palpations: Abdomen is soft.      Tenderness: There is no abdominal tenderness.   Musculoskeletal:         General: No swelling or tenderness.      Cervical back: Neck supple.   Skin:     General: Skin is warm and dry.      Capillary Refill: Capillary refill takes less than 2 seconds.   Neurological:      General: No focal deficit present.      Mental Status: He is alert.   Psychiatric:         Mood and Affect: Mood normal. Affect is flat.         Behavior: Behavior normal.       Results Review     I reviewed the patient's new clinical results.  Results from last 7 days   Lab Units 24  0304 24  0846 24  0249   WBC 10*3/mm3 5.27 6.05  7.37   HEMOGLOBIN g/dL 10.0* 10.8* 11.9*   PLATELETS 10*3/mm3 229 223 242     Results from last 7 days   Lab Units 05/26/24  0611 05/24/24  0304 05/23/24  0846 05/23/24  0249   SODIUM mmol/L 134* 136 138 138   POTASSIUM mmol/L 5.1 4.7 4.3 4.6   CHLORIDE mmol/L 98 98 99 96*   CO2 mmol/L 25.5 27.4 25.9 30.0*   BUN mg/dL 56* 48* 33* 31*   CREATININE mg/dL 4.10* 3.24* 2.63* 2.36*   GLUCOSE mg/dL 114* 108* 134* 203*   EGFR mL/min/1.73 15.0* 19.9* 25.7* 29.3*     Results from last 7 days   Lab Units 05/26/24  0611 05/24/24  0304 05/23/24  0249   ALBUMIN g/dL 3.7 3.7 4.7   BILIRUBIN mg/dL  --   --  0.2   ALK PHOS U/L  --   --  101   AST (SGOT) U/L  --   --  11   ALT (SGPT) U/L  --   --  11     Results from last 7 days   Lab Units 05/26/24  0611 05/24/24  0304 05/23/24  0846 05/23/24  0249   CALCIUM mg/dL 8.9 8.9 9.0 9.3   ALBUMIN g/dL 3.7 3.7  --  4.7   MAGNESIUM mg/dL  --  2.0  --   --    PHOSPHORUS mg/dL 5.0* 4.3  --   --      Results from last 7 days   Lab Units 05/23/24  0249   PROCALCITONIN ng/mL 0.13   LACTATE mmol/L 1.1     Glucose   Date/Time Value Ref Range Status   05/26/2024 1028 122 70 - 130 mg/dL Final   05/26/2024 0805 131 (H) 70 - 130 mg/dL Final   05/25/2024 2051 226 (H) 70 - 130 mg/dL Final   05/25/2024 1623 118 70 - 130 mg/dL Final   05/25/2024 1150 210 (H) 70 - 130 mg/dL Final   05/25/2024 0829 101 70 - 130 mg/dL Final   05/24/2024 2014 71 70 - 130 mg/dL Final       No radiology results for the last day    I have personally reviewed all medications:  Scheduled Medications  apixaban, 2.5 mg, Oral, Q12H  aspirin, 81 mg, Oral, Daily  buPROPion XL, 150 mg, Oral, Daily  cholecalciferol, 5,000 Units, Oral, Daily  insulin lispro, 2-7 Units, Subcutaneous, 4x Daily AC & at Bedtime  levothyroxine, 75 mcg, Oral, Daily  pantoprazole, 40 mg, Oral, BID AC  rosuvastatin, 10 mg, Oral, Daily  sodium chloride, 10 mL, Intravenous, Q12H    Infusions   Diet  Diet: Diabetic; Consistent Carbohydrate; Fluid Consistency: Thin  (IDDSI 0)    I have personally reviewed:  [x]  Laboratory   []  Microbiology   []  Radiology   [x]  EKG/Telemetry  []  Cardiology/Vascular   []  Pathology    []  Records    Assessment/Plan     Active Hospital Problems    Diagnosis  POA    **Generalized weakness [R53.1]  Yes    Exertional dyspnea [R06.09]  Yes    ESRD (end stage renal disease) [N18.6]  Yes    Acute HFrEF (heart failure with reduced ejection fraction) [I50.21]  Yes    History of stroke [Z86.73]  Not Applicable    Paroxysmal atrial fibrillation [I48.0]  Yes    Essential hypertension [I10]  Yes    Coronary artery disease involving native coronary artery of native heart without angina pectoris [I25.10]  Yes    Type 2 diabetes mellitus, with long-term current use of insulin [E11.9, Z79.4]  Not Applicable      Resolved Hospital Problems   No resolved problems to display.   Generalized weakness  - likely multifactorial from deconditioning, chronic illness, and missed dialysis  - no evidence of infection or other metabolic derangement   - PT following, awaiting SNF placement    PAF/CAD/Chronic Systolic CHF  - HR acceptable, no anginal symptoms or evidence of ACS, volume status acceptable  - continue ASA, crestor, coreg, and eliquis  - appreciate cardiology evaluation     Hypothyroidism  - TSH is borderline elevated  - continue levothyroxine at current dose  - follow up TSH in 4-6 weeks as outpatient     GERD  - symptoms controlled  - continue PPI     Type 2 DM  - BG acceptable, A1c 6.9%  - complications include nephropathy/ESRD  - patient denies taking antihyperglycemics at home  - continue on correctional insulin     ESRD  - continue HD per nephrology, appreciate beverley      Eliquis (home med) for DVT prophylaxis.  Full code.  Discussed with patient and nursing staff.  Anticipate discharge to SNU facility once arrangements have been made.  Expected Discharge Date: 5/27/2024; Expected Discharge Time:       Eliseo Gross MD  [facility]  05/26/24  14:56 EDT

## 2024-05-26 NOTE — NURSING NOTE
Treatment completed tolerated well good access f;ow 2 kg removed as ordered access and de-access fistula without issue pressing dressing applied hemostais achieved

## 2024-05-26 NOTE — PLAN OF CARE
Goal Outcome Evaluation:  Plan of Care Reviewed With: patient        Progress: improving  Outcome Evaluation: Pt reporting increased fatigue today due to HD this morning.  SBA for bed mobility today, min A for STS and CGA for gait x 40 feet with RW.  No overt LOB.  Pt remains appropriate for skilled physical therapy while inpatient, SNF is still appropriate at discharge.      Anticipated Discharge Disposition (PT): skilled nursing facility

## 2024-05-26 NOTE — THERAPY TREATMENT NOTE
Patient Name: Carlito Mo  : 1955    MRN: [id number]                              Today's Date: 2024       Admit Date: 2024    Visit Dx:     ICD-10-CM ICD-9-CM   1. Generalized weakness  R53.1 780.79   2. Exertional dyspnea  R06.09 786.09   3. ESRD (end stage renal disease) on dialysis  N18.6 585.6    Z99.2 V45.11     Patient Active Problem List   Diagnosis    Major depressive disorder with single episode, in partial remission    Other hyperlipidemia    Type 2 diabetes mellitus, with long-term current use of insulin    Vitamin D deficiency    Erectile dysfunction    Chronic fatigue    Type 2 diabetes mellitus with ophthalmic complication    Benign prostatic hyperplasia with nocturia    History of basal cell carcinoma    Acquired hypothyroidism    Recurrent strokes    Suspected sleep apnea    YAW (obstructive sleep apnea)    Coronary artery disease involving native coronary artery of native heart without angina pectoris    Chronically elevated troponin    Colon cancer screening    Enlarged lymph nodes    Functional gait abnormality    History of myocardial infarction    Insomnia    Iron deficiency anemia    Major depression, recurrent    Essential hypertension    Acute on chronic renal insufficiency    Falls frequently    Acute on chronic anemia    Neurogenic orthostatic hypotension    Anemia, chronic disease    History of colon polyps    Helicobacter pylori gastritis    Esophagitis    Embolic stroke    Patent foramen ovale    Cardiomyopathy    Diarrhea    Generalized weakness    Paroxysmal atrial fibrillation    Chronic anticoagulation    Acute ischemic stroke    History of stroke    Iron deficiency anemia    Acute HFrEF (heart failure with reduced ejection fraction)    ESRD (end stage renal disease)    Hemoptysis    Chronic HFrEF (heart failure with reduced ejection fraction)    Hemodialysis patient    Exertional dyspnea     Past Medical History:   Diagnosis Date    Acquired hypothyroidism      Acute sinusitis     SYLVAIN (acute kidney injury)     on CKD    Anemia     Arthritis     Atrial fibrillation     CAD (coronary artery disease)     Chronic combined systolic and diastolic congestive heart failure     COPD (chronic obstructive pulmonary disease)     Depression     Diabetic neuropathy 04/11/2022    Diabetes mellitus due to underlying condition    Esophagitis 06/10/2020    Added automatically from request for surgery [id number]      ESRD (end stage renal disease) 12/01/2023    Frequent PVCs     Generalized weakness     GERD (gastroesophageal reflux disease)     Helicobacter pylori gastritis 06/04/2020    Hyperlipidemia     Hypertension     Hypertensive encephalopathy     Pleural effusion     Pneumonia     Poor historian     RBBB (right bundle branch block)     Stroke     POOR VISION    TIA (transient ischemic attack)     Type 2 diabetes mellitus     Urinary retention     Vitamin D deficiency      Past Surgical History:   Procedure Laterality Date    APPENDECTOMY N/A 02/14/2016    Dr. Alexey Dodson    ARTERIOVENOUS FISTULA/SHUNT SURGERY Right 06/03/2022    Procedure: RIGHT FOREARM ARTERIOVENOUS FISTULA PLACEMENT RADIOCEPHALIC WITH CEPHALIC VEIN TRANSPOSITION;  Surgeon: Eliseo Willis MD;  Location: [facility] MAIN OR;  Service: Vascular;  Laterality: Right;    COLONOSCOPY      over 20 years ago.  no polyps     COLONOSCOPY N/A 09/18/2018    Procedure: COLONOSCOPY;  Surgeon: Jose Enrique Louie MD;  Location: [facility] ENDOSCOPY;  Service: Gastroenterology    COLONOSCOPY N/A 09/19/2018    IH, EH, polyps (TAs w/low grade dysplasia)    COLONOSCOPY N/A 06/01/2020    Procedure: COLONOSCOPY;  Surgeon: Jose Enrique Louie MD;  Location: [facility] ENDOSCOPY;  Service: Gastroenterology;  Laterality: N/A;  Pre op-Anemia, Melena, History of Polyps  Post op-To Cecum/TI, Polyp, Poor Prep    CORONARY ARTERY BYPASS GRAFT  11/2001    ENDOSCOPY N/A 05/29/2020    Procedure: ESOPHAGOGASTRODUODENOSCOPY with biopsies;  Surgeon:  Amanda Lovelace MD;  Location: [facility] ENDOSCOPY;  Service: Gastroenterology;  Laterality: N/A;  pre-anemia, dark stools  post-esophagitis, hiatal hernia    ENDOSCOPY N/A 09/15/2020    Procedure: ESOPHAGOGASTRODUODENOSCOPY WITH BIOPSIES;  Surgeon: Jose Enrique Louie MD;  Location: [facility] ENDOSCOPY;  Service: Gastroenterology;  Laterality: N/A;  pre: history of melena and esophagitis  post: mild esophagitis and gastritis, small hiatal hernia    ENDOSCOPY N/A 12/4/2023    Procedure: ESOPHAGOGASTRODUODENOSCOPY with bx;  Surgeon: Quoc Elmore MD;  Location: [facility] ENDOSCOPY;  Service: Gastroenterology;  Laterality: N/A;  pre: Coffee-ground emesis  post: hiatal hernia, esophagitis    HERNIA REPAIR Left 12/05/2019    THORACENTESIS      TONSILLECTOMY      VASECTOMY        General Information       Row Name 05/26/24 1518          Physical Therapy Time and Intention    Document Type therapy note (daily note)  -     Mode of Treatment individual therapy;physical therapy  -       Row Name 05/26/24 1518          General Information    Patient Profile Reviewed yes  -     Existing Precautions/Restrictions fall  -       Row Name 05/26/24 1518          Cognition    Orientation Status (Cognition) oriented x 4  -       Row Name 05/26/24 1518          Safety Issues, Functional Mobility    Impairments Affecting Function (Mobility) balance;endurance/activity tolerance;strength  -               User Key  (r) = Recorded By, (t) = Taken By, (c) = Cosigned By      Initials Name Provider Type    Beatriz Lamar PT Physical Therapist                   Mobility       Row Name 05/26/24 1518          Bed Mobility    Bed Mobility supine-sit;sit-supine  -     Supine-Sit [place] (Bed Mobility) standby assist  -     Sit-Supine [place] (Bed Mobility) standby assist  -     Assistive Device (Bed Mobility) head of bed elevated  -       Row Name 05/26/24 1518          Bed-Chair Transfer    Bed-Chair  [place] (Transfers) not tested  -       Row Name 05/26/24 1518          Sit-Stand Transfer    Sit-Stand [place] (Transfers) minimum assist (75% patient effort);1 person assist  -     Assistive Device (Sit-Stand Transfers) walker, front-wheeled  -JESSICA       Row Name 05/26/24 1518          Gait/Stairs (Locomotion)    [place] Level (Gait) contact guard;verbal cues  -     Assistive Device (Gait) walker, front-wheeled  -JESSICA     Distance in Feet (Gait) 40  -KA     Deviations/Abnormal Patterns (Gait) eli decreased;base of support, wide  -KA     Bilateral Gait Deviations heel strike decreased;forward flexed posture  -     Comment, (Gait/Stairs) Cues for upright posture, fatigue limits ambulation distance  -               User Key  (r) = Recorded By, (t) = Taken By, (c) = Cosigned By      Initials Name Provider Type    Beatriz Lamar, DENISSE Physical Therapist                   Obj/Interventions       Row Name 05/26/24 1519          Balance    Dynamic Standing Balance contact guard  -     Position/Device Used, Standing Balance supported;walker, front-wheeled  -               User Key  (r) = Recorded By, (t) = Taken By, (c) = Cosigned By      Initials Name Provider Type    Beatriz Lamar, DENISSE Physical Therapist                   Goals/Plan    No documentation.                  Clinical Impression       Row Name 05/26/24 1519          Pain    Pretreatment Pain Rating 0/10 - no pain  -     Posttreatment Pain Rating 0/10 - no pain  -     Pain Intervention(s) Ambulation/increased activity;Repositioned  -       Row Name 05/26/24 1519          Plan of Care Review    Plan of Care Reviewed With patient  -     Progress improving  -     Outcome Evaluation Pt reporting increased fatigue today due to HD this morning.  SBA for bed mobility today, min A for STS and CGA for gait x 40 feet with RW.  No overt LOB.  Pt remains appropriate for skilled physical therapy while inpatient, SNF is still  appropriate at discharge.  -       Row Name 05/26/24 1519          Therapy Assessment/Plan (PT)    Rehab Potential (PT) good, to achieve stated therapy goals  -     Criteria for Skilled Interventions Met (PT) yes;meets criteria;skilled treatment is necessary  -     Therapy Frequency (PT) 5 times/wk  -       Row Name 05/26/24 1519          Vital Signs    O2 Delivery Pre Treatment room air  -KA     O2 Delivery Intra Treatment room air  -KA     O2 Delivery Post Treatment room air  -KA     Pre Patient Position Supine  -KA     Intra Patient Position Standing  -KA     Post Patient Position Supine  -       Row Name 05/26/24 1519          Positioning and Restraints    Pre-Treatment Position in bed  -KA     Post Treatment Position bed  -KA     In Bed notified nsg;supine;exit alarm on;encouraged to call for assist;call light within reach  -               User Key  (r) = Recorded By, (t) = Taken By, (c) = Cosigned By      Initials Name Provider Type    Beatriz Lamar, DENISSE Physical Therapist                   Outcome Measures       Row Name 05/26/24 1521          How much help from another person do you currently need...    Turning from your back to your side while in flat bed without using bedrails? 4  -KA     Moving from lying on back to sitting on the side of a flat bed without bedrails? 4  -KA     Moving to and from a bed to a chair (including a wheelchair)? 3  -KA     Standing up from a chair using your arms (e.g., wheelchair, bedside chair)? 3  -KA     Climbing 3-5 steps with a railing? 3  -KA     To walk in hospital room? 3  -KA     AM-PAC 6 Clicks Score (PT) 20  -     Highest Level of Mobility Goal 6 --> Walk 10 steps or more  -       Row Name 05/26/24 1521          Functional Assessment    Outcome Measure Options AM-PAC 6 Clicks Basic Mobility (PT)  -               User Key  (r) = Recorded By, (t) = Taken By, (c) = Cosigned By      Initials Name Provider Type    Beatriz Lamar PT Physical  Therapist                                 Physical Therapy Education       Title: PT OT SLP Therapies (In Progress)       Topic: Physical Therapy (In Progress)       Point: Mobility training (Done)       Learning Progress Summary             Patient Acceptance, E, VU by MN at 5/26/2024 0548    Acceptance, E, VU by MN at 5/25/2024 0601    Acceptance, E, VU,NR by  at 5/23/2024 1505                         Point: Home exercise program (Not Started)       Learner Progress:  Not documented in this visit.              Point: Body mechanics (Not Started)       Learner Progress:  Not documented in this visit.              Point: Precautions (Not Started)       Learner Progress:  Not documented in this visit.                              User Key       Initials Effective Dates Name Provider Type Discipline     06/16/21 -  Katrin Garcia, PT Physical Therapist PT    MN 04/22/24 -  Patti Garrison, Nursing Student Nursing Student Nurse                  PT Recommendation and Plan     Plan of Care Reviewed With: patient  Progress: improving  Outcome Evaluation: Pt reporting increased fatigue today due to HD this morning.  SBA for bed mobility today, min A for STS and CGA for gait x 40 feet with RW.  No overt LOB.  Pt remains appropriate for skilled physical therapy while inpatient, SNF is still appropriate at discharge.     Time Calculation:         PT Charges       Row Name 05/26/24 1522             Time Calculation    Start Time 1421  -KA      Stop Time 1429  -KA      Time Calculation (min) 8 min  -KA      PT Received On 05/26/24  -KA      PT - Next Appointment 05/28/24  -KA         Time Calculation- PT    Total Timed Code Minutes- PT 8 minute(s)  -KA         Timed Charges    22300 - PT Therapeutic Activity Minutes 8  -KA         Total Minutes    Timed Charges Total Minutes 8  -KA       Total Minutes 8  -KA                User Key  (r) = Recorded By, (t) = Taken By, (c) = Cosigned By      Initials Name Provider Type    KA  Beatriz Robles, PT Physical Therapist                  Therapy Charges for Today       Code Description Service Date Service Provider Modifiers Qty    [id number] HC PT THERAPEUTIC ACT EA 15 MIN 5/26/2024 Beatriz Robles, PT GP 1            PT G-Codes  Outcome Measure Options: AM-PAC 6 Clicks Basic Mobility (PT)  AM-PAC 6 Clicks Score (PT): 20  PT Discharge Summary  Anticipated Discharge Disposition (PT): skilled nursing facility    Beatriz oRbles PT  5/26/2024

## 2024-05-26 NOTE — PLAN OF CARE
Goal Outcome Evaluation:              Outcome Evaluation: pt states he has been very fatigued today due to dialysis, rested well this shift. worked with PT, pt did not want to sit up in chair, used the urinal and did get up to the bathroom for BM.

## 2024-05-26 NOTE — PROGRESS NOTES
Continued Stay Note  [facility]     Patient Name: Carlito Mo  MRN: [id number]  Today's Date: 5/26/2024    Admit Date: 5/23/2024    Plan: [facility] pending dialysis arrangements........Armen RN   Discharge Plan       Row Name 05/26/24 0742       Plan    Plan [facility] pending dialysis arrangements........Armen RN    Plan Comments Late entry for 5/25/24: call placed to [facility] for update on HD chair arrangements, return call from Alec/Noni late afternoon to notify CCP she spoke with facility and dialysis  and arrangements ongoing do not expect this to be complete before Tuesday 5/28/24........Armen RN                   Discharge Codes    No documentation.                 Expected Discharge Date and Time       Expected Discharge Date Expected Discharge Time    May 27, 2024               Parvin Ross, RN

## 2024-05-27 LAB
GLUCOSE BLDC GLUCOMTR-MCNC: 116 MG/DL (ref 70–130)
GLUCOSE BLDC GLUCOMTR-MCNC: 123 MG/DL (ref 70–130)
GLUCOSE BLDC GLUCOMTR-MCNC: 251 MG/DL (ref 70–130)
GLUCOSE BLDC GLUCOMTR-MCNC: 273 MG/DL (ref 70–130)

## 2024-05-27 PROCEDURE — G0378 HOSPITAL OBSERVATION PER HR: HCPCS

## 2024-05-27 PROCEDURE — 82948 REAGENT STRIP/BLOOD GLUCOSE: CPT

## 2024-05-27 PROCEDURE — 63710000001 INSULIN LISPRO (HUMAN) PER 5 UNITS: Performed by: NURSE PRACTITIONER

## 2024-05-27 RX ORDER — CARVEDILOL 3.12 MG/1
3.12 TABLET ORAL 2 TIMES DAILY WITH MEALS
Status: DISCONTINUED | OUTPATIENT
Start: 2024-05-27 | End: 2024-05-29 | Stop reason: HOSPADM

## 2024-05-27 RX ADMIN — INSULIN LISPRO 4 UNITS: 100 INJECTION, SOLUTION INTRAVENOUS; SUBCUTANEOUS at 21:28

## 2024-05-27 RX ADMIN — BUPROPION HYDROCHLORIDE 150 MG: 150 TABLET, EXTENDED RELEASE ORAL at 08:29

## 2024-05-27 RX ADMIN — Medication 10 ML: at 21:32

## 2024-05-27 RX ADMIN — ASPIRIN 81 MG: 81 TABLET, COATED ORAL at 08:26

## 2024-05-27 RX ADMIN — CARVEDILOL 3.12 MG: 3.12 TABLET, FILM COATED ORAL at 17:33

## 2024-05-27 RX ADMIN — Medication 10 ML: at 08:27

## 2024-05-27 RX ADMIN — ROSUVASTATIN CALCIUM 10 MG: 10 TABLET, FILM COATED ORAL at 08:26

## 2024-05-27 RX ADMIN — Medication 5000 UNITS: at 08:26

## 2024-05-27 RX ADMIN — APIXABAN 2.5 MG: 2.5 TABLET, FILM COATED ORAL at 21:29

## 2024-05-27 RX ADMIN — LEVOTHYROXINE SODIUM 75 MCG: 75 TABLET ORAL at 08:26

## 2024-05-27 RX ADMIN — PANTOPRAZOLE SODIUM 40 MG: 40 TABLET, DELAYED RELEASE ORAL at 17:33

## 2024-05-27 RX ADMIN — PANTOPRAZOLE SODIUM 40 MG: 40 TABLET, DELAYED RELEASE ORAL at 08:26

## 2024-05-27 RX ADMIN — INSULIN LISPRO 4 UNITS: 100 INJECTION, SOLUTION INTRAVENOUS; SUBCUTANEOUS at 12:09

## 2024-05-27 RX ADMIN — APIXABAN 2.5 MG: 2.5 TABLET, FILM COATED ORAL at 08:26

## 2024-05-27 NOTE — PROGRESS NOTES
Nutrition Services    Patient Name:  Carlito Mo  YOB: 1955  MRN: [id number]  Admit Date:  5/23/2024    Assessment Date:  05/27/24    Summary: BMI 19.7    Nutrition assessment initiated per BMI of 19.7. 69yoM admitted for generalized weakness. Hx of ESRD on dialysis, DM2, CAD, stroke, HTN, HLD. Pt reports fair appetite at home. States his appetite is improving and he is eating most of his meals here. Pt w/ 100% PO intake x 2 meals per chart review. Noted 17% wt loss x 1 yr which is not significant but has had gradual decline in wt over the last 3 yrs as well as some muscle wasting and fat loss per NFPE. Used to be in the 170s-180s per wt hx. Pt likes to drink nutrition supplement at home and discussed doing the same here. Good PO intake and maintaining wt encouraged at bedside. Dialyzed yesterday. Lab, meds, skin reviewed.     Patient meets ASPEN/AND criteria for nutrition diagnosis of severe malnutrition of chronic illness based on: inadequate energy intake, NFPE results    Plan/recs:  Novasource renal BID (B/D)  Maintain wt/ appropriate wt gain  Encourage and monitor PO intake    RD to follow    CLINICAL NUTRITION ASSESSMENT      Reason for Assessment BMI     Diagnosis/Problem   Generalized weakness    Medical/Surgical History Past Medical History:   Diagnosis Date    Acquired hypothyroidism     Acute sinusitis     SYLVAIN (acute kidney injury)     on CKD    Anemia     Arthritis     Atrial fibrillation     CAD (coronary artery disease)     Chronic combined systolic and diastolic congestive heart failure     COPD (chronic obstructive pulmonary disease)     Depression     Diabetic neuropathy 04/11/2022    Diabetes mellitus due to underlying condition    Esophagitis 06/10/2020    Added automatically from request for surgery [id number]      ESRD (end stage renal disease) 12/01/2023    Frequent PVCs     Generalized weakness     GERD (gastroesophageal reflux disease)     Helicobacter pylori gastritis  06/04/2020    Hyperlipidemia     Hypertension     Hypertensive encephalopathy     Pleural effusion     Pneumonia     Poor historian     RBBB (right bundle branch block)     Stroke     POOR VISION    TIA (transient ischemic attack)     Type 2 diabetes mellitus     Urinary retention     Vitamin D deficiency        Past Surgical History:   Procedure Laterality Date    APPENDECTOMY N/A 02/14/2016    Dr. Alexey Dodson    ARTERIOVENOUS FISTULA/SHUNT SURGERY Right 06/03/2022    Procedure: RIGHT FOREARM ARTERIOVENOUS FISTULA PLACEMENT RADIOCEPHALIC WITH CEPHALIC VEIN TRANSPOSITION;  Surgeon: Eliseo Willis MD;  Location: [facility] MAIN OR;  Service: Vascular;  Laterality: Right;    COLONOSCOPY      over 20 years ago.  no polyps     COLONOSCOPY N/A 09/18/2018    Procedure: COLONOSCOPY;  Surgeon: Jose Enrique Louie MD;  Location: [facility] ENDOSCOPY;  Service: Gastroenterology    COLONOSCOPY N/A 09/19/2018    IH, EH, polyps (TAs w/low grade dysplasia)    COLONOSCOPY N/A 06/01/2020    Procedure: COLONOSCOPY;  Surgeon: Jose Enrique Louie MD;  Location: [facility] ENDOSCOPY;  Service: Gastroenterology;  Laterality: N/A;  Pre op-Anemia, Melena, History of Polyps  Post op-To Cecum/TI, Polyp, Poor Prep    CORONARY ARTERY BYPASS GRAFT  11/2001    ENDOSCOPY N/A 05/29/2020    Procedure: ESOPHAGOGASTRODUODENOSCOPY with biopsies;  Surgeon: Amanda Lovelace MD;  Location: [facility] ENDOSCOPY;  Service: Gastroenterology;  Laterality: N/A;  pre-anemia, dark stools  post-esophagitis, hiatal hernia    ENDOSCOPY N/A 09/15/2020    Procedure: ESOPHAGOGASTRODUODENOSCOPY WITH BIOPSIES;  Surgeon: Jose Enrique Louie MD;  Location: [facility] ENDOSCOPY;  Service: Gastroenterology;  Laterality: N/A;  pre: history of melena and esophagitis  post: mild esophagitis and gastritis, small hiatal hernia    ENDOSCOPY N/A 12/4/2023    Procedure: ESOPHAGOGASTRODUODENOSCOPY with bx;  Surgeon: Quoc Elmore MD;  Location: [facility] ENDOSCOPY;  Service:  "Gastroenterology;  Laterality: N/A;  pre: Coffee-ground emesis  post: hiatal hernia, esophagitis    HERNIA REPAIR Left 12/05/2019    THORACENTESIS      TONSILLECTOMY      VASECTOMY          Anthropometrics        Current Height  Current Weight  BMI kg/m2 Height: 182.9 cm (72.01\")  Weight: 65.9 kg (145 lb 4.5 oz) (05/27/24 0506)  Body mass index is 19.7 kg/m².   Adjusted BMI (if applicable)    BMI Category Normal/Healthy (18.4 - 24.9)   Ideal Body Weight (IBW) 171#   Usual Body Weight (UBW) See below   Weight Trend Loss   Weight History Wt Readings from Last 30 Encounters:   05/27/24 0506 65.9 kg (145 lb 4.5 oz)   05/26/24 0520 64.6 kg (142 lb 6.7 oz)   05/24/24 1200 63.7 kg (140 lb 6.9 oz)   05/23/24 1937 64.4 kg (141 lb 15.6 oz)   04/16/24 0936 64.4 kg (142 lb)   01/11/24 0630 64.7 kg (142 lb 11.2 oz)   01/10/24 0424 65.5 kg (144 lb 6.4 oz)   01/09/24 0728 64.4 kg (142 lb)   01/09/24 0513 64.7 kg (142 lb 9.6 oz)   01/08/24 0611 65.5 kg (144 lb 6.4 oz)   01/07/24 0451 63.7 kg (140 lb 6.4 oz)   01/06/24 0634 64.8 kg (142 lb 13.7 oz)   01/05/24 0536 63.8 kg (140 lb 10.5 oz)   01/04/24 0700 68 kg (149 lb 14.6 oz)   01/03/24 2250 64.9 kg (143 lb)   12/10/23 0500 65.1 kg (143 lb 8.3 oz)   12/09/23 1149 65.3 kg (143 lb 15.4 oz)   12/09/23 0615 64.3 kg (141 lb 12.1 oz)   12/08/23 0543 65.8 kg (145 lb 1 oz)   12/07/23 0508 66.2 kg (145 lb 15.1 oz)   12/06/23 0507 68.4 kg (150 lb 12.7 oz)   12/05/23 0716 65.7 kg (144 lb 14.4 oz)   12/05/23 0600 66.6 kg (146 lb 13.2 oz)   12/04/23 0526 69.1 kg (152 lb 5.4 oz)   12/02/23 0536 67.2 kg (148 lb 2.4 oz)   12/01/23 0627 64.5 kg (142 lb 3.2 oz)   11/30/23 0545 67 kg (147 lb 11.3 oz)   11/29/23 0340 61.3 kg (135 lb 2.3 oz)   11/23/23 1441 63.1 kg (139 lb 1.6 oz)   06/22/23 1053 66.2 kg (146 lb)   05/30/23 1339 66.2 kg (146 lb)   05/05/23 0505 78.6 kg (173 lb 3.2 oz)   05/04/23 0613 78.4 kg (172 lb 14.4 oz)   05/03/23 0553 77.7 kg (171 lb 4.8 oz)   05/02/23 1149 77.8 kg (171 lb 8.3 " oz)   05/02/23 0602 81.6 kg (180 lb)   04/14/23 1307 81.6 kg (180 lb)   03/28/23 1148 73.9 kg (163 lb)   03/10/23 0600 71.6 kg (157 lb 12.8 oz)   03/09/23 0550 70 kg (154 lb 4.8 oz)   03/08/23 0954 73 kg (161 lb)   03/08/23 0552 73.3 kg (161 lb 9.6 oz)   03/07/23 1302 74.6 kg (164 lb 7.4 oz)   03/06/23 2020 81.6 kg (180 lb)   10/05/22 1412 71.2 kg (157 lb)   09/18/22 1420 74.4 kg (164 lb)   09/16/22 0500 74.6 kg (164 lb 7.4 oz)   09/16/22 0458 74.6 kg (164 lb 7.4 oz)   07/29/22 0615 74.6 kg (164 lb 8 oz)   07/28/22 0630 75 kg (165 lb 4.8 oz)   07/27/22 0701 76.9 kg (169 lb 8 oz)   07/27/22 0452 77.4 kg (170 lb 11.2 oz)   07/26/22 0809 79.5 kg (175 lb 3.2 oz)   07/26/22 0603 81.5 kg (179 lb 9.6 oz)   07/25/22 0553 83.4 kg (183 lb 14.4 oz)   07/24/22 2007 84.1 kg (185 lb 8 oz)   06/29/22 1422 84.4 kg (186 lb)   06/15/22 1357 80.7 kg (178 lb)   06/10/22 1542 79.3 kg (174 lb 12.8 oz)   06/08/22 1355 77.6 kg (171 lb)   06/04/22 1451 78.8 kg (173 lb 11.6 oz)   06/03/22 0852 80.9 kg (178 lb 6.4 oz)   06/01/22 1934 81.6 kg (180 lb)   05/20/22 0633 79.7 kg (175 lb 11.3 oz)   05/18/22 1342 79.1 kg (174 lb 6.4 oz)   03/08/22 0505 78.2 kg (172 lb 4.8 oz)   03/07/22 0554 78.9 kg (173 lb 14.4 oz)   03/06/22 0228 78.8 kg (173 lb 11.2 oz)   03/05/22 0535 78.3 kg (172 lb 9.6 oz)   03/04/22 0530 75.8 kg (167 lb 1.6 oz)   03/03/22 0602 76 kg (167 lb 8.8 oz)   03/02/22 0512 74.9 kg (165 lb 2 oz)   03/01/22 0647 74.8 kg (165 lb)   02/08/22 1307 77.6 kg (171 lb)   02/03/22 1333 81.6 kg (180 lb)   02/02/22 1949 81.6 kg (180 lb)   02/01/22 1917 81.6 kg (180 lb)   12/28/21 1120 83 kg (183 lb)   12/22/21 0510 85.4 kg (188 lb 3.2 oz)   12/21/21 0555 85.5 kg (188 lb 6.4 oz)   12/20/21 0519 84.4 kg (186 lb)   12/19/21 0519 84.4 kg (186 lb 1.6 oz)   12/18/21 0522 85.1 kg (187 lb 9.6 oz)   12/17/21 0422 85.7 kg (189 lb)   12/16/21 0348 84.6 kg (186 lb 8 oz)   12/15/21 0525 85.3 kg (188 lb 1.6 oz)   12/13/21 2116 86.2 kg (190 lb)   09/29/21 0913  78 kg (172 lb)   09/01/21 1414 80.3 kg (177 lb)        Estimated Requirements         Weight used 65.9 kg    Calories  1977 - 2307 kcal (30-35 kcal/kg)    Protein  79 - 99 g protein (1.2 - 1.5 gm/kg)    Fluid   (1 mL/kcal)     Labs       Pertinent Labs    Results from last 7 days   Lab Units 05/26/24  0611 05/24/24  0304 05/23/24  0846 05/23/24  0249   SODIUM mmol/L 134* 136 138 138   POTASSIUM mmol/L 5.1 4.7 4.3 4.6   CHLORIDE mmol/L 98 98 99 96*   CO2 mmol/L 25.5 27.4 25.9 30.0*   BUN mg/dL 56* 48* 33* 31*   CREATININE mg/dL 4.10* 3.24* 2.63* 2.36*   CALCIUM mg/dL 8.9 8.9 9.0 9.3   BILIRUBIN mg/dL  --   --   --  0.2   ALK PHOS U/L  --   --   --  101   ALT (SGPT) U/L  --   --   --  11   AST (SGOT) U/L  --   --   --  11   GLUCOSE mg/dL 114* 108* 134* 203*     Results from last 7 days   Lab Units 05/26/24  0611 05/24/24  0304   MAGNESIUM mg/dL  --  2.0   PHOSPHORUS mg/dL 5.0* 4.3   HEMOGLOBIN g/dL  --  10.0*   HEMATOCRIT %  --  32.1*   WBC 10*3/mm3  --  5.27   ALBUMIN g/dL 3.7 3.7     Results from last 7 days   Lab Units 05/24/24  0304 05/23/24  0846 05/23/24  0249   INR   --   --  1.19*   APTT seconds  --   --  27.5   PLATELETS 10*3/mm3 229 223 242     COVID19   Date Value Ref Range Status   05/23/2024 Not Detected Not Detected - Ref. Range Final     Lab Results   Component Value Date    HGBA1C 6.9 (H) 05/02/2024          Medications           Scheduled Medications apixaban, 2.5 mg, Oral, Q12H  aspirin, 81 mg, Oral, Daily  buPROPion XL, 150 mg, Oral, Daily  cholecalciferol, 5,000 Units, Oral, Daily  insulin lispro, 2-7 Units, Subcutaneous, 4x Daily AC & at Bedtime  levothyroxine, 75 mcg, Oral, Daily  pantoprazole, 40 mg, Oral, BID AC  rosuvastatin, 10 mg, Oral, Daily  sodium chloride, 10 mL, Intravenous, Q12H       Infusions     PRN Medications   acetaminophen **OR** acetaminophen **OR** acetaminophen    senna-docusate sodium **AND** polyethylene glycol **AND** bisacodyl **AND** bisacodyl    dextrose    dextrose     glucagon (human recombinant)    ondansetron    [COMPLETED] Insert Peripheral IV **AND** sodium chloride    sodium chloride    sodium chloride     Physical Findings          General Findings alert, generalized wasting, oriented   Oral/Mouth Cavity WDL   Edema  no edema   Gastrointestinal WDL, last bowel movement: 5/26   Skin  skin intact   Tubes/Drains/Lines none   NFPE See Malnutrition Severity Assessment, Date Completed: 5/27 GR     Malnutrition Severity Assessment      Patient meets criteria for : (P) Severe Malnutrition  Malnutrition Type (Last 8 Hours)       Malnutrition Severity Assessment       Row Name 05/27/24 1637       Malnutrition Severity Assessment    Malnutrition Type Chronic Disease - Related Malnutrition (P)       Row Name 05/27/24 1637       Insufficient Energy Intake     Insufficient Energy Intake  <75% of est. energy requirement for > or equal to 3 months (P)       Row Name 05/27/24 1637       Muscle Loss    Loss of Muscle Mass Findings Severe (P)     Jehovah's witness Region Moderate - slight depression (P)     Clavicle Bone Region Severe - protruding prominent bone (P)     Acromion Bone Region Severe - squared shoulders, bones, and acromion process protrusion prominent (P)     Scapular Bone Region Severe - prominent bones, depressions easily visible between ribs, scapula, spine, shoulders (P)     Dorsal Hand Region Moderate - slight depression (P)     Patellar Region Severe - prominent bone, square looking, very little muscle definition (P)     Anterior Thigh Region Severe - line/depression along thigh, obviously thin (P)     Posterior Calf Region Severe - thin with very little definition/firmness (P)       Row Name 05/27/24 1637       Fat Loss    Subcutaneous Fat Loss Findings Severe (P)     Orbital Region  Severe - pronounced hollowness/depression, dark circles, loose saggy skin (P)     Upper Arm Region Severe - mostly skin, very little space between folds, fingers touch (P)     Thoracic & Lumbar Region  Severe - ribs visible with prominent depressions, iliac crest very prominent (P)       Row Name 05/27/24 1637       Criteria Met (Must meet criteria for severity in at least 2 of these categories: M Wasting, Fat Loss, Fluid, Secondary Signs, Wt. Status, Intake)    Patient meets criteria for  Severe Malnutrition (P)                        Current Nutrition Orders & Evaluation of Intake       Oral Nutrition     Food Allergies NKFA   Current PO Diet Diet: Diabetic; Consistent Carbohydrate; Fluid Consistency: Thin (IDDSI 0)   Supplement n/a   PO Evaluation     % PO Intake 100% x 2 meals    Factors Affecting Intake: decreased appetite, early satiety , weakness   --  PES STATEMENT / NUTRITION DIAGNOSIS      Nutrition Dx Problem  Problem: Malnutrition (severe)  Etiology: Medical Diagnosis - generalized weakness, ESRD    Signs/Symptoms: Report of Minimal PO Intake, NFPE Results, and Unintended Weight Change     NUTRITION INTERVENTION / PLAN OF CARE      Intervention Goal(s) Establish goals of care, Meet estimated needs, Disease management/therapy, Establish PO intake, Maintain intake, Accepts oral nutrition supplement, No significant weight loss, Appropriate weight gain, and PO intake goal %:          RD Intervention/Action Encourage intake, Continue to monitor, Care plan reviewed, and Recommend/order: NS renal   --      Prescription/Orders:       PO Diet       Supplements NS renal BID      Enteral Nutrition       Parenteral Nutrition    New Prescription Ordered? Yes   --      Monitor/Evaluation Per protocol, PO intake, Supplement intake, Pertinent labs, Weight   Discharge Plan/Needs Pending clinical course   --    RD to follow per protocol.      Electronically signed by:  Trang Rojas  05/27/24 10:13 EDT

## 2024-05-27 NOTE — PROGRESS NOTES
[facility] Progress Note      Patient Name: Carlito Mo  : 1955  MRN: [id number]  Primary Care Physician:  Florencia Caballero MD  Date of admission: 2024    Subjective     Interval History:   Follow-up ESRD  Had HD yesterday; 2 L removed  Feels better this morning; appetite improving  Still awaiting disposition regarding nursing home and dialysis spot there    Review of Systems:   As noted above    Objective     Vitals:   Temp:  [97.7 °F (36.5 °C)-98.1 °F (36.7 °C)] 97.9 °F (36.6 °C)  Heart Rate:  [63-76] 63  Resp:  [16-18] 16  BP: (118-129)/(50-70) 127/70    Intake/Output Summary (Last 24 hours) at 2024 1113  Last data filed at 2024 1020  Gross per 24 hour   Intake 220 ml   Output 220 ml   Net 0 ml       Physical Exam:    Constitutional: Sitting up in bed, playing on phone, chr ill, flat affect  HEENT: Oral mucosa dry  Neck: Supple, no JVD  Heart: Irregular rhythm, no rub   Lungs: Clear to auscultation bilaterally, not labored on RA  Abdomen: BS +, soft, nontender and nondistended  : No palpable bladder  Extremities.  No muscle mass.  Right upper extremity AV fistula patent  Neurologic: moving all extremities, slow responses but appropriate.  Skin: Warm and dry      Scheduled Meds:     apixaban, 2.5 mg, Oral, Q12H  aspirin, 81 mg, Oral, Daily  buPROPion XL, 150 mg, Oral, Daily  cholecalciferol, 5,000 Units, Oral, Daily  insulin lispro, 2-7 Units, Subcutaneous, 4x Daily AC & at Bedtime  levothyroxine, 75 mcg, Oral, Daily  pantoprazole, 40 mg, Oral, BID AC  rosuvastatin, 10 mg, Oral, Daily  sodium chloride, 10 mL, Intravenous, Q12H      IV Meds:        Results Reviewed:   I have personally reviewed the results from the time of this admission to 2024 11:13 EDT     Results from last 7 days   Lab Units 24  0611 24  0304 24  0846 24  0249   SODIUM mmol/L 134* 136 138 138   POTASSIUM mmol/L 5.1 4.7 4.3 4.6   CHLORIDE mmol/L 98 98 99 96*    CO2 mmol/L 25.5 27.4 25.9 30.0*   BUN mg/dL 56* 48* 33* 31*   CREATININE mg/dL 4.10* 3.24* 2.63* 2.36*   CALCIUM mg/dL 8.9 8.9 9.0 9.3   BILIRUBIN mg/dL  --   --   --  0.2   ALK PHOS U/L  --   --   --  101   ALT (SGPT) U/L  --   --   --  11   AST (SGOT) U/L  --   --   --  11   GLUCOSE mg/dL 114* 108* 134* 203*       Estimated Creatinine Clearance: 15.8 mL/min (A) (by C-G formula based on SCr of 4.1 mg/dL (H)).    Results from last 7 days   Lab Units 05/26/24  0611 05/24/24  0304   MAGNESIUM mg/dL  --  2.0   PHOSPHORUS mg/dL 5.0* 4.3             Results from last 7 days   Lab Units 05/24/24  0304 05/23/24  0846 05/23/24  0249   WBC 10*3/mm3 5.27 6.05 7.37   HEMOGLOBIN g/dL 10.0* 10.8* 11.9*   PLATELETS 10*3/mm3 229 223 242       Results from last 7 days   Lab Units 05/23/24  0249   INR  1.19*       Assessment / Plan     ASSESSMENT:  ESRD on maintenance HD TTS via right forearm AV fistula.  Euvolemic.  Dialyzed last yesterday.  Electrolytes yesterday fine.    Hypertension with CKD. Controlled.  Type II diabetes with CKD   Chronic combined heart failure.  CAD/elevated troponin, echo on 3/8/2023 showed EF 41 to 45% with moderate tricuspid regurg  Anemia with chronic kidney disease, on HAI outpatient  Hypothyroidism on replacement.    PLAN:  1.  Dialysis tomorrow to return to TTS schedule  2.  Okay for [facility] when bed available for dialysis.    Thank you for involving us in the care of Carlito MEHTA Chepe.  Please feel free to call with any questions.    Ramakrishna Blake MD  05/27/24  11:13 EDT    [facility]  [phone]    Please note that portions of this note were completed with a voice recognition program.

## 2024-05-27 NOTE — PROGRESS NOTES
Name: Carlito Mo ADMIT: 2024   : 1955  PCP: Florencia Caballero MD    MRN: [id number] LOS: 1 days   AGE/SEX: 69 y.o. male  ROOM: [facility]     Subjective   Subjective   No acute events. Patient denies new complaints. Taking PO.   No family at bedside.    Objective   Objective   Vital Signs  Temp:  [97.7 °F (36.5 °C)-98.1 °F (36.7 °C)] 97.9 °F (36.6 °C)  Heart Rate:  [63-76] 63  Resp:  [16-18] 16  BP: (118-129)/(50-70) 127/70  SpO2:  [99 %-100 %] 99 %  on   ;   Device (Oxygen Therapy): room air  Body mass index is 19.7 kg/m².  Physical Exam  Vitals and nursing note reviewed.   Constitutional:       General: He is not in acute distress.     Appearance: He is ill-appearing (chronically). He is not toxic-appearing or diaphoretic.   HENT:      Head: Normocephalic and atraumatic.      Nose: Nose normal.      Mouth/Throat:      Mouth: Mucous membranes are moist.      Pharynx: Oropharynx is clear.   Eyes:      Conjunctiva/sclera: Conjunctivae normal.      Pupils: Pupils are equal, round, and reactive to light.   Cardiovascular:      Rate and Rhythm: Normal rate and regular rhythm.      Pulses: Normal pulses.   Pulmonary:      Effort: Pulmonary effort is normal.      Breath sounds: Normal breath sounds.   Abdominal:      General: Bowel sounds are normal.      Palpations: Abdomen is soft.      Tenderness: There is no abdominal tenderness.   Musculoskeletal:         General: No swelling or tenderness.      Cervical back: Neck supple.   Skin:     General: Skin is warm and dry.      Capillary Refill: Capillary refill takes less than 2 seconds.   Neurological:      General: No focal deficit present.      Mental Status: He is alert.   Psychiatric:         Mood and Affect: Mood normal. Affect is flat.         Behavior: Behavior normal.       Results Review     I reviewed the patient's new clinical results.  Results from last 7 days   Lab Units 24  0304 24  0846 24  0249   WBC 10*3/mm3 5.27 6.05 7.37    HEMOGLOBIN g/dL 10.0* 10.8* 11.9*   PLATELETS 10*3/mm3 229 223 242     Results from last 7 days   Lab Units 05/26/24  0611 05/24/24  0304 05/23/24  0846 05/23/24  0249   SODIUM mmol/L 134* 136 138 138   POTASSIUM mmol/L 5.1 4.7 4.3 4.6   CHLORIDE mmol/L 98 98 99 96*   CO2 mmol/L 25.5 27.4 25.9 30.0*   BUN mg/dL 56* 48* 33* 31*   CREATININE mg/dL 4.10* 3.24* 2.63* 2.36*   GLUCOSE mg/dL 114* 108* 134* 203*   EGFR mL/min/1.73 15.0* 19.9* 25.7* 29.3*     Results from last 7 days   Lab Units 05/26/24  0611 05/24/24  0304 05/23/24  0249   ALBUMIN g/dL 3.7 3.7 4.7   BILIRUBIN mg/dL  --   --  0.2   ALK PHOS U/L  --   --  101   AST (SGOT) U/L  --   --  11   ALT (SGPT) U/L  --   --  11     Results from last 7 days   Lab Units 05/26/24  0611 05/24/24  0304 05/23/24  0846 05/23/24  0249   CALCIUM mg/dL 8.9 8.9 9.0 9.3   ALBUMIN g/dL 3.7 3.7  --  4.7   MAGNESIUM mg/dL  --  2.0  --   --    PHOSPHORUS mg/dL 5.0* 4.3  --   --      Results from last 7 days   Lab Units 05/23/24  0249   PROCALCITONIN ng/mL 0.13   LACTATE mmol/L 1.1     Glucose   Date/Time Value Ref Range Status   05/27/2024 0741 116 70 - 130 mg/dL Final   05/26/2024 2043 146 (H) 70 - 130 mg/dL Final   05/26/2024 1631 184 (H) 70 - 130 mg/dL Final   05/26/2024 1028 122 70 - 130 mg/dL Final   05/26/2024 0805 131 (H) 70 - 130 mg/dL Final   05/25/2024 2051 226 (H) 70 - 130 mg/dL Final   05/25/2024 1623 118 70 - 130 mg/dL Final       No radiology results for the last day    I have personally reviewed all medications:  Scheduled Medications  apixaban, 2.5 mg, Oral, Q12H  aspirin, 81 mg, Oral, Daily  buPROPion XL, 150 mg, Oral, Daily  carvedilol, 3.125 mg, Oral, BID With Meals  cholecalciferol, 5,000 Units, Oral, Daily  insulin lispro, 2-7 Units, Subcutaneous, 4x Daily AC & at Bedtime  levothyroxine, 75 mcg, Oral, Daily  pantoprazole, 40 mg, Oral, BID AC  rosuvastatin, 10 mg, Oral, Daily  sodium chloride, 10 mL, Intravenous, Q12H    Infusions   Diet  Diet: Diabetic;  Consistent Carbohydrate; Fluid Consistency: Thin (IDDSI 0)    I have personally reviewed:  [x]  Laboratory   []  Microbiology   []  Radiology   [x]  EKG/Telemetry  []  Cardiology/Vascular   []  Pathology    []  Records    Assessment/Plan     Active Hospital Problems    Diagnosis  POA    **Generalized weakness [R53.1]  Yes    Exertional dyspnea [R06.09]  Yes    ESRD (end stage renal disease) [N18.6]  Yes    Acute HFrEF (heart failure with reduced ejection fraction) [I50.21]  Yes    History of stroke [Z86.73]  Not Applicable    Paroxysmal atrial fibrillation [I48.0]  Yes    Essential hypertension [I10]  Yes    Coronary artery disease involving native coronary artery of native heart without angina pectoris [I25.10]  Yes    Type 2 diabetes mellitus, with long-term current use of insulin [E11.9, Z79.4]  Not Applicable      Resolved Hospital Problems   No resolved problems to display.   Generalized weakness  - likely multifactorial from deconditioning, chronic illness, and missed dialysis  - no evidence of infection or other metabolic derangement   - PT following, awaiting SNF placement with dialysis spot    PAF/CAD/Chronic Systolic CHF  - HR acceptable, no anginal symptoms or evidence of ACS, volume status acceptable  - continue ASA, crestor, coreg, and eliquis  - appreciate cardiology evaluation     Hypothyroidism  - TSH is borderline elevated  - continue levothyroxine at current dose  - follow up TSH in 4-6 weeks as outpatient     GERD  - symptoms controlled  - continue PPI     Type 2 DM  - BG acceptable, A1c 6.9%  - complications include nephropathy/ESRD  - patient denies taking antihyperglycemics at home  - continue on correctional insulin     ESRD  - continue HD per nephrology, appreciate recs      Eliquis (home med) for DVT prophylaxis.  Full code.  Discussed with patient and nursing staff.  Anticipate discharge to SNU facility once arrangements have been made.  Expected Discharge Date: 5/28/2024; Expected  Discharge Time:       Eliseo Gross MD  [facility]  05/27/24  11:44 EDT    Portions of this note have been copied and I have reviewed them. They are accurate as of 5/27/2024

## 2024-05-27 NOTE — PLAN OF CARE
Goal Outcome Evaluation:  Plan of Care Reviewed With: patient        Progress: improving  Outcome Evaluation: Pt has been sleeping most of the shift. Up to the toilet x1 for BM. Pt has no c/o pain. Probably discharge on 5/28  need dialysis chair at [facility]. Pt has been in controlled Afib most of the shift, on room air. Pt is eager to go to rehab. HD yesterday. CTM, safety maintained.

## 2024-05-27 NOTE — PLAN OF CARE
Goal Outcome Evaluation:  Plan of Care Reviewed With: patient  VSS  Pt resting off and on throughout shift  Denies pain  Plan is for dialysis tomorrow and transfer to rehab facility if dialysis chair obtained  Safety maintained        Progress: improving

## 2024-05-28 LAB
ALBUMIN SERPL-MCNC: 3.5 G/DL (ref 3.5–5.2)
ANION GAP SERPL CALCULATED.3IONS-SCNC: 11 MMOL/L (ref 5–15)
BUN SERPL-MCNC: 64 MG/DL (ref 8–23)
BUN/CREAT SERPL: 15.9 (ref 7–25)
CALCIUM SPEC-SCNC: 8.5 MG/DL (ref 8.6–10.5)
CHLORIDE SERPL-SCNC: 103 MMOL/L (ref 98–107)
CO2 SERPL-SCNC: 25 MMOL/L (ref 22–29)
CREAT SERPL-MCNC: 4.03 MG/DL (ref 0.76–1.27)
EGFRCR SERPLBLD CKD-EPI 2021: 15.3 ML/MIN/1.73
GLUCOSE BLDC GLUCOMTR-MCNC: 114 MG/DL (ref 70–130)
GLUCOSE BLDC GLUCOMTR-MCNC: 130 MG/DL (ref 70–130)
GLUCOSE BLDC GLUCOMTR-MCNC: 131 MG/DL (ref 70–130)
GLUCOSE BLDC GLUCOMTR-MCNC: 225 MG/DL (ref 70–130)
GLUCOSE SERPL-MCNC: 181 MG/DL (ref 65–99)
PHOSPHATE SERPL-MCNC: 4.7 MG/DL (ref 2.5–4.5)
POTASSIUM SERPL-SCNC: 4.7 MMOL/L (ref 3.5–5.2)
SODIUM SERPL-SCNC: 139 MMOL/L (ref 136–145)

## 2024-05-28 PROCEDURE — G0378 HOSPITAL OBSERVATION PER HR: HCPCS

## 2024-05-28 PROCEDURE — G0257 UNSCHED DIALYSIS ESRD PT HOS: HCPCS

## 2024-05-28 PROCEDURE — 82948 REAGENT STRIP/BLOOD GLUCOSE: CPT

## 2024-05-28 PROCEDURE — 80069 RENAL FUNCTION PANEL: CPT | Performed by: INTERNAL MEDICINE

## 2024-05-28 RX ADMIN — APIXABAN 2.5 MG: 2.5 TABLET, FILM COATED ORAL at 21:02

## 2024-05-28 RX ADMIN — Medication 10 ML: at 21:08

## 2024-05-28 RX ADMIN — PANTOPRAZOLE SODIUM 40 MG: 40 TABLET, DELAYED RELEASE ORAL at 14:33

## 2024-05-28 RX ADMIN — Medication 5000 UNITS: at 14:33

## 2024-05-28 RX ADMIN — BUPROPION HYDROCHLORIDE 150 MG: 150 TABLET, EXTENDED RELEASE ORAL at 14:33

## 2024-05-28 RX ADMIN — ROSUVASTATIN CALCIUM 10 MG: 10 TABLET, FILM COATED ORAL at 14:33

## 2024-05-28 RX ADMIN — LEVOTHYROXINE SODIUM 75 MCG: 75 TABLET ORAL at 14:33

## 2024-05-28 RX ADMIN — CARVEDILOL 3.12 MG: 3.12 TABLET, FILM COATED ORAL at 14:33

## 2024-05-28 RX ADMIN — APIXABAN 2.5 MG: 2.5 TABLET, FILM COATED ORAL at 14:33

## 2024-05-28 RX ADMIN — PANTOPRAZOLE SODIUM 40 MG: 40 TABLET, DELAYED RELEASE ORAL at 21:02

## 2024-05-28 RX ADMIN — Medication 10 ML: at 08:19

## 2024-05-28 RX ADMIN — ASPIRIN 81 MG: 81 TABLET, COATED ORAL at 14:33

## 2024-05-28 RX ADMIN — CARVEDILOL 3.12 MG: 3.12 TABLET, FILM COATED ORAL at 21:02

## 2024-05-28 NOTE — PROGRESS NOTES
[facility] Progress Note      Patient Name: Carlito Mo  : 1955  MRN: [id number]  Primary Care Physician:  Florencia Caballero MD  Date of admission: 2024    Subjective     Interval History:   Follow-up ESRD  Sitting up in bed, eating breakfast  No shortness of breath on room air  Still awaiting disposition regarding nursing home and dialysis spot there    Review of Systems:   As noted above    Objective     Vitals:   Temp:  [97.3 °F (36.3 °C)-98.2 °F (36.8 °C)] 97.7 °F (36.5 °C)  Heart Rate:  [62-74] 74  Resp:  [16] 16  BP: (101-129)/(54-68) 129/60    Intake/Output Summary (Last 24 hours) at 2024 0848  Last data filed at 2024 0819  Gross per 24 hour   Intake 240 ml   Output 670 ml   Net -430 ml       Physical Exam:    Constitutional: Sitting up in bed, eating breakfast, chr ill, flat affect  Psychiatric: Very slow responses  HEENT: Oral mucosa moist, temporal wasting  Neck: Supple, no JVD  Heart: RRR, no rub  Lungs: Clear to auscultation bilaterally, not labored on RA  Abdomen: BS +, soft, nontender and nondistended  : No palpable bladder  Extremities.  No muscle mass.  Right upper extremity AV fistula patent  Neurologic: moving all extremities  Skin: Warm and dry      Scheduled Meds:     apixaban, 2.5 mg, Oral, Q12H  aspirin, 81 mg, Oral, Daily  buPROPion XL, 150 mg, Oral, Daily  carvedilol, 3.125 mg, Oral, BID With Meals  cholecalciferol, 5,000 Units, Oral, Daily  insulin lispro, 2-7 Units, Subcutaneous, 4x Daily AC & at Bedtime  levothyroxine, 75 mcg, Oral, Daily  pantoprazole, 40 mg, Oral, BID AC  rosuvastatin, 10 mg, Oral, Daily  sodium chloride, 10 mL, Intravenous, Q12H      IV Meds:        Results Reviewed:   I have personally reviewed the results from the time of this admission to 2024 08:48 EDT     Results from last 7 days   Lab Units 24  0358 24  0611 24  0304 24  0846 24  0249   SODIUM mmol/L 139 134* 136   < > 138    POTASSIUM mmol/L 4.7 5.1 4.7   < > 4.6   CHLORIDE mmol/L 103 98 98   < > 96*   CO2 mmol/L 25.0 25.5 27.4   < > 30.0*   BUN mg/dL 64* 56* 48*   < > 31*   CREATININE mg/dL 4.03* 4.10* 3.24*   < > 2.36*   CALCIUM mg/dL 8.5* 8.9 8.9   < > 9.3   BILIRUBIN mg/dL  --   --   --   --  0.2   ALK PHOS U/L  --   --   --   --  101   ALT (SGPT) U/L  --   --   --   --  11   AST (SGOT) U/L  --   --   --   --  11   GLUCOSE mg/dL 181* 114* 108*   < > 203*    < > = values in this interval not displayed.       Estimated Creatinine Clearance: 15.8 mL/min (A) (by C-G formula based on SCr of 4.03 mg/dL (H)).    Results from last 7 days   Lab Units 05/28/24  0358 05/26/24  0611 05/24/24  0304   MAGNESIUM mg/dL  --   --  2.0   PHOSPHORUS mg/dL 4.7* 5.0* 4.3             Results from last 7 days   Lab Units 05/24/24  0304 05/23/24  0846 05/23/24  0249   WBC 10*3/mm3 5.27 6.05 7.37   HEMOGLOBIN g/dL 10.0* 10.8* 11.9*   PLATELETS 10*3/mm3 229 223 242       Results from last 7 days   Lab Units 05/23/24  0249   INR  1.19*       Assessment / Plan     ASSESSMENT:  ESRD on maintenance HD TTS via right forearm AV fistula.  Euvolemic.  Due for HD today.  Electrolytes fine.    Hypertension with CKD. Controlled.  Type II diabetes with CKD   Chronic combined heart failure.  CAD/elevated troponin, echo on 3/8/2023 showed EF 41 to 45% with moderate tricuspid regurg  Anemia with chronic kidney disease, on HAI outpatient  Hypothyroidism on replacement.    PLAN:  1.  Dialysis today (TTS)   2.  Okay for [facility] when bed available for dialysis.    Thank you for involving us in the care of Carlito Mo.  Please feel free to call with any questions.    Ramakrishna Blake MD  05/28/24  08:48 EDT    [facility]  [phone]    Please note that portions of this note were completed with a voice recognition program.

## 2024-05-28 NOTE — NURSING NOTE
Hd completed. Stable throughout tx. Pt disregard for needle safety. Educated and redirected for pt avf safety. Voided appox 20-30cc clear light yellow on tx. Post vss 150/84 hr 71 . Uf 3 liters.

## 2024-05-28 NOTE — PLAN OF CARE
Goal Outcome Evaluation:  Plan of Care Reviewed With: patient        Progress: no change  Outcome Evaluation: Patient alert and oriented. HD today and tolerated well, removed 3L. Dressing c/d/i to shunt site. Appetite and fluid intake adequate. Voids in small amounts per urinal. Small bm noted this afternoon. Ambulated with this RN and walker to bathroom, very fatigued during and after. Held evening insulin as patient had late lunch d/t hd and refused dinner so he could sleep. Vital signs stable. Call light in reach. Safety maintained.

## 2024-05-28 NOTE — PROGRESS NOTES
Name: Carlito Mo ADMIT: 2024   : 1955  PCP: Florencia Caballero MD    MRN: [id number] LOS: 1 days   AGE/SEX: 69 y.o. male  ROOM: [facility] [place]     Subjective   Subjective   No acute events. Patient denies new complaints. Taking PO.   No family at bedside.    Objective   Objective   Vital Signs  Temp:  [97.3 °F (36.3 °C)-98.2 °F (36.8 °C)] 97.9 °F (36.6 °C)  Heart Rate:  [62-74] 69  Resp:  [16] 16  BP: (122-150)/(60-84) 141/80  SpO2:  [99 %-100 %] 100 %  on   ;   Device (Oxygen Therapy): room air  Body mass index is 19.25 kg/m².  Physical Exam  Vitals and nursing note reviewed.   Constitutional:       General: He is not in acute distress.     Appearance: He is ill-appearing (chronically). He is not toxic-appearing or diaphoretic.   HENT:      Head: Normocephalic and atraumatic.      Nose: Nose normal.      Mouth/Throat:      Mouth: Mucous membranes are moist.      Pharynx: Oropharynx is clear.   Eyes:      Conjunctiva/sclera: Conjunctivae normal.      Pupils: Pupils are equal, round, and reactive to light.   Cardiovascular:      Rate and Rhythm: Normal rate and regular rhythm.      Pulses: Normal pulses.   Pulmonary:      Effort: Pulmonary effort is normal.      Breath sounds: Normal breath sounds.   Abdominal:      General: Bowel sounds are normal.      Palpations: Abdomen is soft.      Tenderness: There is no abdominal tenderness.   Musculoskeletal:         General: No swelling or tenderness.      Cervical back: Neck supple.   Skin:     General: Skin is warm and dry.      Capillary Refill: Capillary refill takes less than 2 seconds.   Neurological:      General: No focal deficit present.      Mental Status: He is alert.   Psychiatric:         Mood and Affect: Mood normal. Affect is flat.         Behavior: Behavior normal.       Results Review     I reviewed the patient's new clinical results.  Results from last 7 days   Lab Units 24  0304 24  0846 24  0249   WBC 10*3/mm3 5.27 6.05 7.37    HEMOGLOBIN g/dL 10.0* 10.8* 11.9*   PLATELETS 10*3/mm3 229 223 242     Results from last 7 days   Lab Units 05/28/24 0358 05/26/24 0611 05/24/24 0304 05/23/24  0846   SODIUM mmol/L 139 134* 136 138   POTASSIUM mmol/L 4.7 5.1 4.7 4.3   CHLORIDE mmol/L 103 98 98 99   CO2 mmol/L 25.0 25.5 27.4 25.9   BUN mg/dL 64* 56* 48* 33*   CREATININE mg/dL 4.03* 4.10* 3.24* 2.63*   GLUCOSE mg/dL 181* 114* 108* 134*   EGFR mL/min/1.73 15.3* 15.0* 19.9* 25.7*     Results from last 7 days   Lab Units 05/28/24 0358 05/26/24 0611 05/24/24  0304 05/23/24  0249   ALBUMIN g/dL 3.5 3.7 3.7 4.7   BILIRUBIN mg/dL  --   --   --  0.2   ALK PHOS U/L  --   --   --  101   AST (SGOT) U/L  --   --   --  11   ALT (SGPT) U/L  --   --   --  11     Results from last 7 days   Lab Units 05/28/24 0358 05/26/24 0611 05/24/24 0304 05/23/24  0846 05/23/24  0249   CALCIUM mg/dL 8.5* 8.9 8.9 9.0 9.3   ALBUMIN g/dL 3.5 3.7 3.7  --  4.7   MAGNESIUM mg/dL  --   --  2.0  --   --    PHOSPHORUS mg/dL 4.7* 5.0* 4.3  --   --      Results from last 7 days   Lab Units 05/23/24  0249   PROCALCITONIN ng/mL 0.13   LACTATE mmol/L 1.1     Glucose   Date/Time Value Ref Range Status   05/28/2024 1408 131 (H) 70 - 130 mg/dL Final   05/28/2024 0725 114 70 - 130 mg/dL Final   05/27/2024 2112 251 (H) 70 - 130 mg/dL Final   05/27/2024 1604 123 70 - 130 mg/dL Final   05/27/2024 1201 273 (H) 70 - 130 mg/dL Final   05/27/2024 0741 116 70 - 130 mg/dL Final   05/26/2024 2043 146 (H) 70 - 130 mg/dL Final       No radiology results for the last day    I have personally reviewed all medications:  Scheduled Medications  apixaban, 2.5 mg, Oral, Q12H  aspirin, 81 mg, Oral, Daily  buPROPion XL, 150 mg, Oral, Daily  carvedilol, 3.125 mg, Oral, BID With Meals  cholecalciferol, 5,000 Units, Oral, Daily  insulin lispro, 2-7 Units, Subcutaneous, 4x Daily AC & at Bedtime  levothyroxine, 75 mcg, Oral, Daily  pantoprazole, 40 mg, Oral, BID AC  rosuvastatin, 10 mg, Oral, Daily  sodium  chloride, 10 mL, Intravenous, Q12H    Infusions   Diet  Diet: Diabetic; Consistent Carbohydrate; Fluid Consistency: Thin (IDDSI 0)    I have personally reviewed:  [x]  Laboratory   []  Microbiology   []  Radiology   [x]  EKG/Telemetry  []  Cardiology/Vascular   []  Pathology    []  Records    Assessment/Plan     Active Hospital Problems    Diagnosis  POA    **Generalized weakness [R53.1]  Yes    Exertional dyspnea [R06.09]  Yes    ESRD (end stage renal disease) [N18.6]  Yes    Acute HFrEF (heart failure with reduced ejection fraction) [I50.21]  Yes    History of stroke [Z86.73]  Not Applicable    Paroxysmal atrial fibrillation [I48.0]  Yes    Essential hypertension [I10]  Yes    Coronary artery disease involving native coronary artery of native heart without angina pectoris [I25.10]  Yes    Type 2 diabetes mellitus, with long-term current use of insulin [E11.9, Z79.4]  Not Applicable      Resolved Hospital Problems   No resolved problems to display.   Generalized weakness  - likely multifactorial from deconditioning, chronic illness, and missed dialysis  - no evidence of infection or other metabolic derangement   - PT following, awaiting SNF placement with dialysis spot    PAF/CAD/Chronic Systolic CHF  - HR acceptable, no anginal symptoms or evidence of ACS, volume status acceptable  - continue ASA, crestor, coreg, and eliquis  - appreciate cardiology evaluation     Hypothyroidism  - TSH is borderline elevated  - continue levothyroxine at current dose  - follow up TSH in 4-6 weeks as outpatient     GERD  - symptoms controlled  - continue PPI     Type 2 DM  - BG acceptable, A1c 6.9%  - complications include nephropathy/ESRD  - patient denies taking antihyperglycemics at home  - continue on correctional insulin     ESRD  - continue HD per nephrology, appreciate recs      Eliquis (home med) for DVT prophylaxis.  Full code.  Discussed with patient and nursing staff.  Anticipate discharge to SNU facility once  arrangements have been made.  Expected Discharge Date: 5/28/2024; Expected Discharge Time:       Eliseo Gross MD  [facility]  05/28/24  15:52 EDT    Portions of this note have been copied and I have reviewed them. They are accurate as of 5/28/2024

## 2024-05-29 VITALS
OXYGEN SATURATION: 99 % | DIASTOLIC BLOOD PRESSURE: 64 MMHG | WEIGHT: 138.45 LBS | HEIGHT: 72 IN | SYSTOLIC BLOOD PRESSURE: 114 MMHG | RESPIRATION RATE: 16 BRPM | BODY MASS INDEX: 18.75 KG/M2 | HEART RATE: 74 BPM | TEMPERATURE: 98.2 F

## 2024-05-29 PROBLEM — E43 SEVERE MALNUTRITION: Status: ACTIVE | Noted: 2024-05-29

## 2024-05-29 LAB
GLUCOSE BLDC GLUCOMTR-MCNC: 162 MG/DL (ref 70–130)
GLUCOSE BLDC GLUCOMTR-MCNC: 81 MG/DL (ref 70–130)

## 2024-05-29 PROCEDURE — G0378 HOSPITAL OBSERVATION PER HR: HCPCS

## 2024-05-29 PROCEDURE — 97110 THERAPEUTIC EXERCISES: CPT

## 2024-05-29 PROCEDURE — 97530 THERAPEUTIC ACTIVITIES: CPT

## 2024-05-29 PROCEDURE — 82948 REAGENT STRIP/BLOOD GLUCOSE: CPT

## 2024-05-29 PROCEDURE — 63710000001 INSULIN LISPRO (HUMAN) PER 5 UNITS: Performed by: NURSE PRACTITIONER

## 2024-05-29 RX ADMIN — LEVOTHYROXINE SODIUM 75 MCG: 75 TABLET ORAL at 06:30

## 2024-05-29 RX ADMIN — CARVEDILOL 3.12 MG: 3.12 TABLET, FILM COATED ORAL at 09:21

## 2024-05-29 RX ADMIN — INSULIN LISPRO 2 UNITS: 100 INJECTION, SOLUTION INTRAVENOUS; SUBCUTANEOUS at 09:21

## 2024-05-29 RX ADMIN — ROSUVASTATIN CALCIUM 10 MG: 10 TABLET, FILM COATED ORAL at 09:21

## 2024-05-29 RX ADMIN — PANTOPRAZOLE SODIUM 40 MG: 40 TABLET, DELAYED RELEASE ORAL at 09:21

## 2024-05-29 RX ADMIN — ASPIRIN 81 MG: 81 TABLET, COATED ORAL at 09:21

## 2024-05-29 RX ADMIN — BUPROPION HYDROCHLORIDE 150 MG: 150 TABLET, EXTENDED RELEASE ORAL at 09:21

## 2024-05-29 RX ADMIN — Medication 5000 UNITS: at 09:21

## 2024-05-29 RX ADMIN — Medication 10 ML: at 09:21

## 2024-05-29 RX ADMIN — APIXABAN 2.5 MG: 2.5 TABLET, FILM COATED ORAL at 09:21

## 2024-05-29 NOTE — DISCHARGE SUMMARY
"Date of Admission: 5/23/2024  Date of Discharge:  5/29/2024  Primary Care Physician: Florencia Caballero MD     Discharge Diagnosis:  Active Hospital Problems    Diagnosis  POA    **Generalized weakness [R53.1]  Yes    Severe malnutrition [E43]  Yes    Exertional dyspnea [R06.09]  Yes    ESRD (end stage renal disease) [N18.6]  Yes    Acute HFrEF (heart failure with reduced ejection fraction) [I50.21]  Yes    History of stroke [Z86.73]  Not Applicable    Paroxysmal atrial fibrillation [I48.0]  Yes    Essential hypertension [I10]  Yes    Coronary artery disease involving native coronary artery of native heart without angina pectoris [I25.10]  Yes    Type 2 diabetes mellitus, with long-term current use of insulin [E11.9, Z79.4]  Not Applicable      Resolved Hospital Problems   No resolved problems to display.       Presenting Problem/History of Present Illness:  Exertional dyspnea [R06.09]  ESRD (end stage renal disease) on dialysis [N18.6, Z99.2]  Generalized weakness [R53.1]     Hospital Course:  The patient is a 69 y.o. male with a history of HTN, HLD, Type 2 DM, CAD, PAF on eliquis, chronic combined systolic and diastolic CHF, and ESRD on hemodialysis who presented with  generalized weakness and shortness of breath. Please see admission H&P for further details. His weakness was thought to be due to deconditioning, chronic illness, and missed dialysis. He was evaluated by nephrology and underwent dialysis per his usual schedule. His symptoms improved. He is medically stable and will be discharged to SNF and should continue hemodialysis as scheduled.    Exam Today:  Blood pressure 114/64, pulse 74, temperature 98.2 °F (36.8 °C), temperature source Oral, resp. rate 16, height 182.9 cm (72.01\"), weight 62.8 kg (138 lb 7.2 oz), SpO2 99%.  Vitals and nursing note reviewed.   Constitutional:       General: He is not in acute distress.     Appearance: He appears chronically ill. He is not toxic-appearing or diaphoretic. "   HENT:      Head: Normocephalic and atraumatic.      Nose: Nose normal.      Mouth/Throat:      Mouth: Mucous membranes are moist.      Pharynx: Oropharynx is clear.   Eyes:      Conjunctiva/sclera: Conjunctivae normal.      Pupils: Pupils are equal, round, and reactive to light.   Cardiovascular:      Rate and Rhythm: Normal rate and regular rhythm.      Pulses: Normal pulses.   Pulmonary:      Effort: Pulmonary effort is normal.      Breath sounds: Normal breath sounds.   Abdominal:      General: Bowel sounds are normal.      Palpations: Abdomen is soft.      Tenderness: There is no abdominal tenderness.   Musculoskeletal:         General: No swelling or tenderness.      Cervical back: Neck supple.   Skin:     General: Skin is warm and dry.      Capillary Refill: Capillary refill takes less than 2 seconds.   Neurological:      General: No focal deficit present.      Mental Status: He is alert.   Psychiatric:         Mood and Affect: Mood normal. Affect is flat.       Behavior: Behavior normal.     Procedures Performed:      Consults:   Consults       Date and Time Order Name Status Description    5/23/2024 10:01 AM Inpatient Cardiology Consult      5/23/2024  5:57 AM Inpatient Nephrology Consult      5/23/2024  5:10 AM [facility] (on-call MD unless specified) Details               Discharge Disposition:  Skilled Nursing Facility (DC - External)    Discharge Medications:     Discharge Medications        Continue These Medications        Instructions Start Date   apixaban 2.5 MG tablet tablet  Commonly known as: ELIQUIS   2.5 mg, Oral, Every 12 Hours Scheduled      aspirin 81 MG EC tablet   81 mg, Oral, Daily      budesonide-formoterol 160-4.5 MCG/ACT inhaler  Commonly known as: SYMBICORT   2 puffs, Inhalation, 2 Times Daily - RT      buPROPion  MG 24 hr tablet  Commonly known as: WELLBUTRIN XL   150 mg, Oral, Daily      carvedilol 3.125 MG tablet  Commonly known as: COREG   3.125 mg, Oral, 2 Times Daily       famotidine 10 MG tablet  Commonly known as: PEPCID   10 mg, Oral, 2 Times Daily PRN      ipratropium-albuterol  MCG/ACT inhaler  Commonly known as: COMBIVENT RESPIMAT   1 puff, Inhalation, 4 Times Daily PRN      levothyroxine 75 MCG tablet  Commonly known as: SYNTHROID, LEVOTHROID   75 mcg, Oral, Daily      pantoprazole 40 MG EC tablet  Commonly known as: PROTONIX   40 mg, Oral, 2 Times Daily Before Meals      rosuvastatin 10 MG tablet  Commonly known as: CRESTOR   10 mg, Oral, Daily      tamsulosin 0.4 MG capsule 24 hr capsule  Commonly known as: FLOMAX   1 capsule, Oral, Daily      vitamin D3 125 MCG (5000 UT) capsule capsule   5,000 Units, Oral, Daily             Stop These Medications      Basaglar Tempo Pen 100 UNIT/ML solution pen-injector  Generic drug: Insulin Glargine w/ Trans Port     insulin glargine 100 UNIT/ML injection  Commonly known as: LANTUS, SEMGLEE     INSULIN GLULISINE SC     sodium bicarbonate 650 MG tablet     torsemide 20 MG tablet  Commonly known as: DEMADEX              Discharge Diet:   Diet Instructions       Diet: Diabetic Diets; Consistent Carbohydrate; Regular (IDDSI 7); Thin (IDDSI 0)      Discharge Diet: Diabetic Diets    Diabetic Diet: Consistent Carbohydrate    Texture: Regular (IDDSI 7)    Fluid Consistency: Thin (IDDSI 0)            Activity at Discharge:   Activity Instructions       Activity as Tolerated              Follow-up Appointments:  Future Appointments   Date Time Provider Department Center   7/15/2024 11:00 AM [facility] RM 2 [facility]   7/15/2024 12:15 PM Katrin Shah APRN [facility] Gross MD  05/29/24  11:50 EDT    Time Spent on Discharge Activities: Greater than 30 minutes.

## 2024-05-29 NOTE — NURSING NOTE
Report given to DEMAR Ku at [facility]. Patient belongings gathered. IV's removed. Awaiting transport.

## 2024-05-29 NOTE — THERAPY TREATMENT NOTE
Patient Name: Carlito Mo  : 1955    MRN: [id number]                              Today's Date: 2024       Admit Date: 2024    Visit Dx:     ICD-10-CM ICD-9-CM   1. Generalized weakness  R53.1 780.79   2. Exertional dyspnea  R06.09 786.09   3. ESRD (end stage renal disease) on dialysis  N18.6 585.6    Z99.2 V45.11     Patient Active Problem List   Diagnosis    Major depressive disorder with single episode, in partial remission    Other hyperlipidemia    Type 2 diabetes mellitus, with long-term current use of insulin    Vitamin D deficiency    Erectile dysfunction    Chronic fatigue    Type 2 diabetes mellitus with ophthalmic complication    Benign prostatic hyperplasia with nocturia    History of basal cell carcinoma    Acquired hypothyroidism    Recurrent strokes    Suspected sleep apnea    YAW (obstructive sleep apnea)    Coronary artery disease involving native coronary artery of native heart without angina pectoris    Chronically elevated troponin    Colon cancer screening    Enlarged lymph nodes    Functional gait abnormality    History of myocardial infarction    Insomnia    Iron deficiency anemia    Major depression, recurrent    Essential hypertension    Acute on chronic renal insufficiency    Falls frequently    Acute on chronic anemia    Neurogenic orthostatic hypotension    Anemia, chronic disease    History of colon polyps    Helicobacter pylori gastritis    Esophagitis    Embolic stroke    Patent foramen ovale    Cardiomyopathy    Diarrhea    Generalized weakness    Paroxysmal atrial fibrillation    Chronic anticoagulation    Acute ischemic stroke    History of stroke    Iron deficiency anemia    Acute HFrEF (heart failure with reduced ejection fraction)    ESRD (end stage renal disease)    Hemoptysis    Chronic HFrEF (heart failure with reduced ejection fraction)    Hemodialysis patient    Exertional dyspnea    Severe malnutrition     Past Medical History:   Diagnosis Date     Acquired hypothyroidism     Acute sinusitis     SYLVAIN (acute kidney injury)     on CKD    Anemia     Arthritis     Atrial fibrillation     CAD (coronary artery disease)     Chronic combined systolic and diastolic congestive heart failure     COPD (chronic obstructive pulmonary disease)     Depression     Diabetic neuropathy 04/11/2022    Diabetes mellitus due to underlying condition    Esophagitis 06/10/2020    Added automatically from request for surgery [id number]      ESRD (end stage renal disease) 12/01/2023    Frequent PVCs     Generalized weakness     GERD (gastroesophageal reflux disease)     Helicobacter pylori gastritis 06/04/2020    Hyperlipidemia     Hypertension     Hypertensive encephalopathy     Pleural effusion     Pneumonia     Poor historian     RBBB (right bundle branch block)     Stroke     POOR VISION    TIA (transient ischemic attack)     Type 2 diabetes mellitus     Urinary retention     Vitamin D deficiency      Past Surgical History:   Procedure Laterality Date    APPENDECTOMY N/A 02/14/2016    Dr. Alexey Dodson    ARTERIOVENOUS FISTULA/SHUNT SURGERY Right 06/03/2022    Procedure: RIGHT FOREARM ARTERIOVENOUS FISTULA PLACEMENT RADIOCEPHALIC WITH CEPHALIC VEIN TRANSPOSITION;  Surgeon: Eliseo Willis MD;  Location: [facility] MAIN OR;  Service: Vascular;  Laterality: Right;    COLONOSCOPY      over 20 years ago.  no polyps     COLONOSCOPY N/A 09/18/2018    Procedure: COLONOSCOPY;  Surgeon: Jose Enrique Louie MD;  Location: [facility] ENDOSCOPY;  Service: Gastroenterology    COLONOSCOPY N/A 09/19/2018    IH, EH, polyps (TAs w/low grade dysplasia)    COLONOSCOPY N/A 06/01/2020    Procedure: COLONOSCOPY;  Surgeon: Jose Enrique Louie MD;  Location: [facility] ENDOSCOPY;  Service: Gastroenterology;  Laterality: N/A;  Pre op-Anemia, Melena, History of Polyps  Post op-To Cecum/TI, Polyp, Poor Prep    CORONARY ARTERY BYPASS GRAFT  11/2001    ENDOSCOPY N/A 05/29/2020    Procedure: ESOPHAGOGASTRODUODENOSCOPY  with biopsies;  Surgeon: Amanda Lovelace MD;  Location:  [facility]OSCOPY;  Service: Gastroenterology;  Laterality: N/A;  pre-anemia, dark stools  post-esophagitis, hiatal hernia    ENDOSCOPY N/A 09/15/2020    Procedure: ESOPHAGOGASTRODUODENOSCOPY WITH BIOPSIES;  Surgeon: Jose Enrique Louie MD;  Location: [facility] ENDOSCOPY;  Service: Gastroenterology;  Laterality: N/A;  pre: history of melena and esophagitis  post: mild esophagitis and gastritis, small hiatal hernia    ENDOSCOPY N/A 12/4/2023    Procedure: ESOPHAGOGASTRODUODENOSCOPY with bx;  Surgeon: Quoc Elmore MD;  Location: [facility] ENDOSCOPY;  Service: Gastroenterology;  Laterality: N/A;  pre: Coffee-ground emesis  post: hiatal hernia, esophagitis    HERNIA REPAIR Left 12/05/2019    THORACENTESIS      TONSILLECTOMY      VASECTOMY        General Information       Row Name 05/29/24 1039          Physical Therapy Time and Intention    Document Type therapy note (daily note)  -     Mode of Treatment individual therapy;physical therapy  -       Row Name 05/29/24 1039          General Information    Patient Profile Reviewed yes  -     Existing Precautions/Restrictions fall  -       Row Name 05/29/24 1039          Living Environment    People in Home spouse  -[facility] Name 05/29/24 1039          Cognition    Orientation Status (Cognition) oriented x 4  slow to respond  -       Row Name 05/29/24 1039          Safety Issues, Functional Mobility    Safety Issues Affecting Function (Mobility) insight into deficits/self-awareness;problem-solving;safety precaution awareness  -     Impairments Affecting Function (Mobility) balance;endurance/activity tolerance;strength  -               User Key  (r) = Recorded By, (t) = Taken By, (c) = Cosigned By      Initials Name Provider Type    Amber Esposito PTA Physical Therapist Assistant                   Mobility       Row Name 05/29/24 1041          Bed Mobility    Supine-Sit Martell (Bed  Mobility) standby assist;verbal cues  -     Sit-Supine Dewart (Bed Mobility) minimum assist (75% patient effort)  -     Assistive Device (Bed Mobility) bed rails;head of bed elevated  -     Comment, (Bed Mobility) incr time , rails used, HOB slightly elevated  -       Row Name 05/29/24 1041          Bed-Chair Transfer    Bed-Chair Dewart (Transfers) minimum assist (75% patient effort)  to bsc  -JM       Row Name 05/29/24 1041          Sit-Stand Transfer    Sit-Stand Dewart (Transfers) contact guard  -     Assistive Device (Sit-Stand Transfers) walker, front-wheeled  -[facility] Name 05/29/24 1041          Gait/Stairs (Locomotion)    Dewart Level (Gait) contact guard;minimum assist (75% patient effort);verbal cues  -     Assistive Device (Gait) walker, front-wheeled  -     Distance in Feet (Gait) 15  x2, dizziness and seated rest and BM on bs  -     Deviations/Abnormal Patterns (Gait) eli decreased;festinating/shuffling;base of support, narrow  -     Bilateral Gait Deviations forward flexed posture  -               User Key  (r) = Recorded By, (t) = Taken By, (c) = Cosigned By      Initials Name Provider Type    Amber Esposito PTA Physical Therapist Assistant                   Obj/Interventions       [facility] Name 05/29/24 1306          Motor Skills    Therapeutic Exercise --  APs, QS, seated MIP, LAQs x10-12 reps, rests required to complete  -[facility] Name 05/29/24 1306          Balance    Static Standing Balance standby assist  -               User Key  (r) = Recorded By, (t) = Taken By, (c) = Cosigned By      Initials Name Provider Type    Abmer Esposito PTA Physical Therapist Assistant                   Goals/Plan    No documentation.                  Clinical Impression       Row Name 05/29/24 1307          Pain    Pretreatment Pain Rating 0/10 - no pain  -     Posttreatment Pain Rating 0/10 - no pain  -     Pre/Posttreatment Pain Comment just  "\"weakness\"  -       Row Name 05/29/24 1307          Plan of Care Review    Plan of Care Reviewed With patient  -     Outcome Evaluation Pt agreed to PT session, but was unsure if he would have to use bsc during session, pt caleb bed mob with CG/MIN 1, tfers w/CGa/min , amb with assist of 1 and rwx ~15ft x2, req seated rest and use of bsc with lengthy sit time , assist for clean-up, plans SNU at DC  -       Row Name 05/29/24 1307          Therapy Assessment/Plan (PT)    Rehab Potential (PT) good, to achieve stated therapy goals  -     Criteria for Skilled Interventions Met (PT) yes  -       Row Name 05/29/24 1307          Vital Signs    O2 Delivery Pre Treatment room air  -[facility] Name 05/29/24 1307          Positioning and Restraints    Pre-Treatment Position in bed  -     Post Treatment Position bed  -     In Bed supine;call light within reach;encouraged to call for assist;exit alarm on;notified nsg  -               User Key  (r) = Recorded By, (t) = Taken By, (c) = Cosigned By      Initials Name Provider Type    Amber Esposito PTA Physical Therapist Assistant                   Outcome Measures       Row Name 05/29/24 1310 05/29/24 0830       How much help from another person do you currently need...    Turning from your back to your side while in flat bed without using bedrails? 4  -JM 4  -HD    Moving from lying on back to sitting on the side of a flat bed without bedrails? 3  - 4  -HD    Moving to and from a bed to a chair (including a wheelchair)? 3  - 3  -HD    Standing up from a chair using your arms (e.g., wheelchair, bedside chair)? 3  - 3  -HD    Climbing 3-5 steps with a railing? 1  -JM 2  -HD    To walk in hospital room? 3  - 3  -HD    AM-PAC 6 Clicks Score (PT) 17  -JM 19  -HD    Highest Level of Mobility Goal 5 --> Static standing  - 6 --> Walk 10 steps or more  -HD              User Key  (r) = Recorded By, (t) = Taken By, (c) = Cosigned By      Initials Name Provider " Type    Amber Esposito PTA Physical Therapist Assistant    Macarena Gupta RN Registered Nurse                                 Physical Therapy Education       Title: PT OT SLP Therapies (Resolved)       Topic: Physical Therapy (Resolved)       Point: Mobility training (Resolved)       Learning Progress Summary             Patient Acceptance, E, VU by MN at 5/26/2024 0548    Acceptance, E, VU by MN at 5/25/2024 0601    Acceptance, E, VU,NR by  at 5/23/2024 1505                         Point: Home exercise program (Resolved)       Learner Progress:  Not documented in this visit.              Point: Body mechanics (Resolved)       Learner Progress:  Not documented in this visit.              Point: Precautions (Resolved)       Learner Progress:  Not documented in this visit.                              User Key       Initials Effective Dates Name Provider Type Discipline    EE 06/16/21 -  Katrin Garcia, PT Physical Therapist PT    MN 04/22/24 -  Patti Garrison, Nursing Student Nursing Student Nurse                  PT Recommendation and Plan     Plan of Care Reviewed With: patient  Outcome Evaluation: Pt agreed to PT session, but was unsure if he would have to use bsc during session, pt caleb bed mob with CG/MIN 1, tfers w/CGa/min , amb with assist of 1 and rwx ~15ft x2, req seated rest and use of bsc with lengthy sit time , assist for clean-up, plans SNU at DC     Time Calculation:         PT Charges       Row Name 05/29/24 1311             Time Calculation    Start Time 1016  -      Stop Time 1055  -      Time Calculation (min) 39 min  -      PT Received On 05/29/24  -                User Key  (r) = Recorded By, (t) = Taken By, (c) = Cosigned By      Initials Name Provider Type    Amber Esposito PTA Physical Therapist Assistant                  Therapy Charges for Today       Code Description Service Date Service Provider Modifiers Qty    51002688371 HC PT THERAPEUTIC ACT EA 15 MIN  5/29/2024 Amber Burgess PTA GP 2    10103631446 HC PT THER PROC EA 15 MIN 5/29/2024 Amber Burgess PTA GP 1            PT G-Codes  Outcome Measure Options: AM-PAC 6 Clicks Basic Mobility (PT)  AM-PAC 6 Clicks Score (PT): 17  PT Discharge Summary  Anticipated Discharge Disposition (PT): skilled nursing facility    Amber Burgess PTA  5/29/2024

## 2024-05-29 NOTE — PLAN OF CARE
Goal Outcome Evaluation:  Plan of Care Reviewed With: patient           Outcome Evaluation: Pt agreed to PT session, but was unsure if he would have to use bsc during session, pt caleb bed mob with CG/MIN 1, tfers w/CGa/min , amb with assist of 1 and rwx ~15ft x2, req seated rest and use of bsc with lengthy sit time , assist for clean-up, plans SNU at DC      Anticipated Discharge Disposition (PT): skilled nursing facility

## 2024-05-29 NOTE — PLAN OF CARE
Goal Outcome Evaluation:  Plan of Care Reviewed With: patient        Progress: improving  Outcome Evaluation: Patient has been alert and oriented. Up to and from bathroom with staff assist and walker. No complaints of pain. Appetite and fluid intake adequate. Patient has orders to discharge to SNF this shift. HD chair scheduled for T/TH/Sat. Dressing to shunt in place with good thrill and bruit. Transport scheduled for 1230. Will remove IV's and assist with gathering belongings and calling report to facility. Vital signs stable. Call light in reach. Safety maintained.

## 2024-05-29 NOTE — PROGRESS NOTES
[facility] Progress Note      Patient Name: Carlito Mo  : 1955  MRN: [id number]  Primary Care Physician:  Florencia Caballero MD  Date of admission: 2024    Subjective     Interval History:   Follow-up ESRD  Sitting up in bed, looking at phone  HD yesterday without incident; 3 L removed  No shortness of breath on room air    Review of Systems:   As noted above    Objective     Vitals:   Temp:  [97.2 °F (36.2 °C)-98.2 °F (36.8 °C)] 98.2 °F (36.8 °C)  Heart Rate:  [69-80] 74  Resp:  [16] 16  BP: (114-150)/(64-84) 114/64    Intake/Output Summary (Last 24 hours) at 2024 1154  Last data filed at 2024 0924  Gross per 24 hour   Intake 480 ml   Output 2500 ml   Net -2020 ml       Physical Exam:    Constitutional: Sitting up in bed, conversant, chr ill, flat affect  Psychiatric: Very slow responses  HEENT: Oral mucosa moist, temporal wasting  Neck: Supple, no JVD  Heart: RRR, no rub  Lungs: Clear to auscultation bilaterally, not labored on RA  Abdomen: BS +, soft, nontender and nondistended  : No palpable bladder  Extremities.  No muscle mass.  Right upper extremity AV fistula patent  Neurologic: moving all extremities  Skin: Warm and dry      Scheduled Meds:     apixaban, 2.5 mg, Oral, Q12H  aspirin, 81 mg, Oral, Daily  buPROPion XL, 150 mg, Oral, Daily  carvedilol, 3.125 mg, Oral, BID With Meals  cholecalciferol, 5,000 Units, Oral, Daily  insulin lispro, 2-7 Units, Subcutaneous, 4x Daily AC & at Bedtime  levothyroxine, 75 mcg, Oral, Daily  pantoprazole, 40 mg, Oral, BID AC  rosuvastatin, 10 mg, Oral, Daily  sodium chloride, 10 mL, Intravenous, Q12H      IV Meds:        Results Reviewed:   I have personally reviewed the results from the time of this admission to 2024 11:54 EDT     Results from last 7 days   Lab Units 24  0358 24  0611 24  0304 24  0846 24  0249   SODIUM mmol/L 139 134* 136   < > 138   POTASSIUM mmol/L 4.7 5.1 4.7   < >  4.6   CHLORIDE mmol/L 103 98 98   < > 96*   CO2 mmol/L 25.0 25.5 27.4   < > 30.0*   BUN mg/dL 64* 56* 48*   < > 31*   CREATININE mg/dL 4.03* 4.10* 3.24*   < > 2.36*   CALCIUM mg/dL 8.5* 8.9 8.9   < > 9.3   BILIRUBIN mg/dL  --   --   --   --  0.2   ALK PHOS U/L  --   --   --   --  101   ALT (SGPT) U/L  --   --   --   --  11   AST (SGOT) U/L  --   --   --   --  11   GLUCOSE mg/dL 181* 114* 108*   < > 203*    < > = values in this interval not displayed.       Estimated Creatinine Clearance: 15.4 mL/min (A) (by C-G formula based on SCr of 4.03 mg/dL (H)).    Results from last 7 days   Lab Units 05/28/24  0358 05/26/24  0611 05/24/24  0304   MAGNESIUM mg/dL  --   --  2.0   PHOSPHORUS mg/dL 4.7* 5.0* 4.3             Results from last 7 days   Lab Units 05/24/24  0304 05/23/24  0846 05/23/24  0249   WBC 10*3/mm3 5.27 6.05 7.37   HEMOGLOBIN g/dL 10.0* 10.8* 11.9*   PLATELETS 10*3/mm3 229 223 242       Results from last 7 days   Lab Units 05/23/24  0249   INR  1.19*       Assessment / Plan     ASSESSMENT:  ESRD on maintenance HD TTS via right forearm AV fistula.  Euvolemic.  Had HD yesterday.  Electrolytes fine at last check  Hypertension with CKD. Controlled.  Type II diabetes with CKD   Chronic combined heart failure.  CAD/elevated troponin, echo on 3/8/2023 showed EF 41 to 45% with moderate tricuspid regurg  Anemia with chronic kidney disease, on HAI outpatient  Hypothyroidism on replacement.    PLAN:  1.  Dialysis TTS  2.  To [facility] anytime from renal view    Thank you for involving us in the care of Carlito Mo.  Please feel free to call with any questions.    Ramakrishna Blake MD  05/29/24  11:54 EDT    [facility]  [phone]    Please note that portions of this note were completed with a voice recognition program.

## 2024-05-29 NOTE — CASE MANAGEMENT/SOCIAL WORK
Case Management Discharge Note      Final Note: DC to [facility] via [facility] transportation         Selected Continued Care - Discharged on 5/29/2024 Admission date: 5/23/2024 - Discharge disposition: Skilled Nursing Facility (DC - External)      Destination Coordination complete.      Service Provider Selected Services Address Phone Fax Patient Preferred    [facility] [facility] [address] [phone] --              Durable Medical Equipment    No services have been selected for the patient.                Dialysis/Infusion    No services have been selected for the patient.                Home Medical Care    No services have been selected for the patient.                Therapy    No services have been selected for the patient.                Community Resources    No services have been selected for the patient.                Community & DME    No services have been selected for the patient.                    Transportation Services  Ambulance:  ([facility])    Final Discharge Disposition Code: 03 - skilled nursing facility (SNF)

## 2024-05-29 NOTE — DISCHARGE PLACEMENT REQUEST
"Carlito Sanchez (69 y.o. Male)       Date of Birth   1955    Social Security Number       Address   [address] [place] [address]    Home Phone   [phone]    MRN   [id number]       [facility]    Marital Status                               Admission Date   5/23/24    Admission Type   Emergency    Admitting Provider   Asim Hogue MD    Attending Provider   Eliseo Gross MD    Department, Room/Bed   [address], [address]/1       Discharge Date       Discharge Disposition   Skilled Nursing Facility (DC - External)    Discharge Destination                                 Attending Provider: Eliseo Gross MD    Allergies: Prozac [Fluoxetine Hcl]    Isolation: None   Infection: None   Code Status: CPR    Ht: 182.9 cm (72.01\")   Wt: 62.8 kg (138 lb 7.2 oz)    Admission Cmt: None   Principal Problem: Generalized weakness [R53.1]                   Active Insurance as of 5/23/2024       Primary Coverage       Payor Plan Insurance Group Employer/Plan Group    ANTHEM MEDICARE REPLACEMENT ANTHEM MEDICARE ADVANTAGE [id number]       Payor Plan Address Payor Plan Phone Number Payor Plan Fax Number Effective Dates    [address] [phone]  2/1/2023 - None Entered    [facility] [address]         Subscriber Name Subscriber Birth Date Member ID       CARLITO SANCHEZ 1955 [id number]                     Emergency Contacts        (Rel.) Home Phone Work Phone Mobile Phone    Lissette Sanchez (Spouse) [phone] -- [phone]                   Discharge Summary        Eliseo Gross MD at 05/29/24 1147          Date of Admission: 5/23/2024  Date of Discharge:  5/29/2024  Primary Care Physician: Florencia Caballero MD     Discharge Diagnosis:  Active Hospital Problems    Diagnosis  POA    **Generalized weakness [R53.1]  Yes    Severe malnutrition [E43]  Yes    Exertional dyspnea [R06.09]  Yes    ESRD (end stage renal disease) [N18.6]  Yes    Acute " "HFrEF (heart failure with reduced ejection fraction) [I50.21]  Yes    History of stroke [Z86.73]  Not Applicable    Paroxysmal atrial fibrillation [I48.0]  Yes    Essential hypertension [I10]  Yes    Coronary artery disease involving native coronary artery of native heart without angina pectoris [I25.10]  Yes    Type 2 diabetes mellitus, with long-term current use of insulin [E11.9, Z79.4]  Not Applicable      Resolved Hospital Problems   No resolved problems to display.       Presenting Problem/History of Present Illness:  Exertional dyspnea [R06.09]  ESRD (end stage renal disease) on dialysis [N18.6, Z99.2]  Generalized weakness [R53.1]     Hospital Course:  The patient is a 69 y.o. male with a history of HTN, HLD, Type 2 DM, CAD, PAF on eliquis, chronic combined systolic and diastolic CHF, and ESRD on hemodialysis who presented with  generalized weakness and shortness of breath. Please see admission H&P for further details. His weakness was thought to be due to deconditioning, chronic illness, and missed dialysis. He was evaluated by nephrology and underwent dialysis per his usual schedule. His symptoms improved. He is medically stable and will be discharged to SNF and should continue hemodialysis as scheduled.    Exam Today:  Blood pressure 114/64, pulse 74, temperature 98.2 °F (36.8 °C), temperature source Oral, resp. rate 16, height 182.9 cm (72.01\"), weight 62.8 kg (138 lb 7.2 oz), SpO2 99%.  Vitals and nursing note reviewed.   Constitutional:       General: He is not in acute distress.     Appearance: He appears chronically ill. He is not toxic-appearing or diaphoretic.   HENT:      Head: Normocephalic and atraumatic.      Nose: Nose normal.      Mouth/Throat:      Mouth: Mucous membranes are moist.      Pharynx: Oropharynx is clear.   Eyes:      Conjunctiva/sclera: Conjunctivae normal.      Pupils: Pupils are equal, round, and reactive to light.   Cardiovascular:      Rate and Rhythm: Normal rate and " regular rhythm.      Pulses: Normal pulses.   Pulmonary:      Effort: Pulmonary effort is normal.      Breath sounds: Normal breath sounds.   Abdominal:      General: Bowel sounds are normal.      Palpations: Abdomen is soft.      Tenderness: There is no abdominal tenderness.   Musculoskeletal:         General: No swelling or tenderness.      Cervical back: Neck supple.   Skin:     General: Skin is warm and dry.      Capillary Refill: Capillary refill takes less than 2 seconds.   Neurological:      General: No focal deficit present.      Mental Status: He is alert.   Psychiatric:         Mood and Affect: Mood normal. Affect is flat.       Behavior: Behavior normal.     Procedures Performed:      Consults:   Consults       Date and Time Order Name Status Description    5/23/2024 10:01 AM Inpatient Cardiology Consult      5/23/2024  5:57 AM Inpatient Nephrology Consult      5/23/2024  5:10 AM [facility] (on-call MD unless specified) Details               Discharge Disposition:  Skilled Nursing Facility (DC - External)    Discharge Medications:     Discharge Medications        Continue These Medications        Instructions Start Date   apixaban 2.5 MG tablet tablet  Commonly known as: ELIQUIS   2.5 mg, Oral, Every 12 Hours Scheduled      aspirin 81 MG EC tablet   81 mg, Oral, Daily      budesonide-formoterol 160-4.5 MCG/ACT inhaler  Commonly known as: SYMBICORT   2 puffs, Inhalation, 2 Times Daily - RT      buPROPion  MG 24 hr tablet  Commonly known as: WELLBUTRIN XL   150 mg, Oral, Daily      carvedilol 3.125 MG tablet  Commonly known as: COREG   3.125 mg, Oral, 2 Times Daily      famotidine 10 MG tablet  Commonly known as: PEPCID   10 mg, Oral, 2 Times Daily PRN      ipratropium-albuterol  MCG/ACT inhaler  Commonly known as: COMBIVENT RESPIMAT   1 puff, Inhalation, 4 Times Daily PRN      levothyroxine 75 MCG tablet  Commonly known as: SYNTHROID, LEVOTHROID   75 mcg, Oral, Daily      pantoprazole 40 MG EC  tablet  Commonly known as: PROTONIX   40 mg, Oral, 2 Times Daily Before Meals      rosuvastatin 10 MG tablet  Commonly known as: CRESTOR   10 mg, Oral, Daily      tamsulosin 0.4 MG capsule 24 hr capsule  Commonly known as: FLOMAX   1 capsule, Oral, Daily      vitamin D3 125 MCG (5000 UT) capsule capsule   5,000 Units, Oral, Daily             Stop These Medications      Basaglar Tempo Pen 100 UNIT/ML solution pen-injector  Generic drug: Insulin Glargine w/ Trans Port     insulin glargine 100 UNIT/ML injection  Commonly known as: LANTUS, SEMGLEE     INSULIN GLULISINE SC     sodium bicarbonate 650 MG tablet     torsemide 20 MG tablet  Commonly known as: DEMADEX              Discharge Diet:   Diet Instructions       Diet: Diabetic Diets; Consistent Carbohydrate; Regular (IDDSI 7); Thin (IDDSI 0)      Discharge Diet: Diabetic Diets    Diabetic Diet: Consistent Carbohydrate    Texture: Regular (IDDSI 7)    Fluid Consistency: Thin (IDDSI 0)            Activity at Discharge:   Activity Instructions       Activity as Tolerated              Follow-up Appointments:  Future Appointments   Date Time Provider Department Center   7/15/2024 11:00 AM [facility]   7/15/2024 12:15 PM Katrin Shah, APRN [facility]       Frannie Gross MD  05/29/24  11:50 EDT    Time Spent on Discharge Activities: Greater than 30 minutes.          Electronically signed by Frannie Gross MD at 05/29/24 1150       Discharge Order (From admission, onward)       Start     Ordered    05/29/24 1144  Discharge patient  Once        Expected Discharge Date: 05/29/24   Expected Discharge Time: Midday   Discharge Disposition: Skilled Nursing Facility (DC - External)   Physician of Record for Attribution - Please select from Treatment Team: FRANNIE GROSS [[id number]]   Review needed by CMO to determine Physician of Record: No      Question Answer Comment   Physician of Record for Attribution - Please select from Treatment  Team FRANNIE FLORENTINO    Review needed by CMO to determine Physician of Record No        05/29/24 114

## 2024-05-29 NOTE — CASE MANAGEMENT/SOCIAL WORK
Continued Stay Note  [facility]     Patient Name: Carlito Mo  MRN: [id number]  Today's Date: 5/29/2024    Admit Date: 5/23/2024    Plan: DC to [facility] to transport at 1230   Discharge Plan       Row Name 05/29/24 1133       Plan    Plan DC to [facility] to transport at 1230    Plan Comments Pt to be dc to [facility] via [facility] transport at 1230. Informed pt and spouse Lissette Mo of disposition. Informed [facility] of disposition. Packet to nurse.                   Discharge Codes    No documentation.                 Expected Discharge Date and Time       Expected Discharge Date Expected Discharge Time    May 29, 2024               Heike Cavazos RN

## 2024-06-04 ENCOUNTER — TRANSCRIBE ORDERS (OUTPATIENT)
Dept: HOME HEALTH SERVICES | Facility: HOME HEALTHCARE | Age: 69
End: 2024-06-04
Payer: MEDICARE

## 2024-06-04 ENCOUNTER — HOME HEALTH ADMISSION (OUTPATIENT)
Dept: HOME HEALTH SERVICES | Facility: HOME HEALTHCARE | Age: 69
End: 2024-06-04
Payer: COMMERCIAL

## 2024-06-04 DIAGNOSIS — N18.6 END STAGE RENAL DISEASE: Primary | ICD-10-CM

## 2024-09-03 ENCOUNTER — HOSPITAL ENCOUNTER (INPATIENT)
Facility: HOSPITAL | Age: 69
LOS: 5 days | Discharge: NURSING, REHAB OR LONG-TERM CARE | DRG: 064 | End: 2024-09-08
Attending: EMERGENCY MEDICINE | Admitting: HOSPITALIST
Payer: MEDICARE

## 2024-09-03 DIAGNOSIS — R27.0 ATAXIA: Primary | ICD-10-CM

## 2024-09-03 LAB
ALBUMIN SERPL-MCNC: 4.5 G/DL (ref 3.5–5.2)
ALBUMIN/GLOB SERPL: 1.6 G/DL
ALP SERPL-CCNC: 86 U/L (ref 39–117)
ALT SERPL W P-5'-P-CCNC: 15 U/L (ref 1–41)
ANION GAP SERPL CALCULATED.3IONS-SCNC: 11.7 MMOL/L (ref 5–15)
AST SERPL-CCNC: 15 U/L (ref 1–40)
BASOPHILS # BLD AUTO: 0.01 10*3/MM3 (ref 0–0.2)
BASOPHILS NFR BLD AUTO: 0.1 % (ref 0–1.5)
BILIRUB SERPL-MCNC: 0.2 MG/DL (ref 0–1.2)
BUN SERPL-MCNC: 26 MG/DL (ref 8–23)
BUN/CREAT SERPL: 10.5 (ref 7–25)
CALCIUM SPEC-SCNC: 9.3 MG/DL (ref 8.6–10.5)
CHLORIDE SERPL-SCNC: 97 MMOL/L (ref 98–107)
CO2 SERPL-SCNC: 29.3 MMOL/L (ref 22–29)
CREAT SERPL-MCNC: 2.48 MG/DL (ref 0.76–1.27)
DEPRECATED RDW RBC AUTO: 41.5 FL (ref 37–54)
EGFRCR SERPLBLD CKD-EPI 2021: 27.4 ML/MIN/1.73
EOSINOPHIL # BLD AUTO: 0.03 10*3/MM3 (ref 0–0.4)
EOSINOPHIL NFR BLD AUTO: 0.4 % (ref 0.3–6.2)
ERYTHROCYTE [DISTWIDTH] IN BLOOD BY AUTOMATED COUNT: 13.4 % (ref 12.3–15.4)
GEN 5 2HR TROPONIN T REFLEX: 123 NG/L
GLOBULIN UR ELPH-MCNC: 2.8 GM/DL
GLUCOSE SERPL-MCNC: 232 MG/DL (ref 65–99)
HCT VFR BLD AUTO: 36.2 % (ref 37.5–51)
HGB BLD-MCNC: 11.5 G/DL (ref 13–17.7)
IMM GRANULOCYTES # BLD AUTO: 0.05 10*3/MM3 (ref 0–0.05)
IMM GRANULOCYTES NFR BLD AUTO: 0.7 % (ref 0–0.5)
LYMPHOCYTES # BLD AUTO: 0.75 10*3/MM3 (ref 0.7–3.1)
LYMPHOCYTES NFR BLD AUTO: 11.2 % (ref 19.6–45.3)
MCH RBC QN AUTO: 27.6 PG (ref 26.6–33)
MCHC RBC AUTO-ENTMCNC: 31.8 G/DL (ref 31.5–35.7)
MCV RBC AUTO: 86.8 FL (ref 79–97)
MONOCYTES # BLD AUTO: 0.49 10*3/MM3 (ref 0.1–0.9)
MONOCYTES NFR BLD AUTO: 7.3 % (ref 5–12)
NEUTROPHILS NFR BLD AUTO: 5.34 10*3/MM3 (ref 1.7–7)
NEUTROPHILS NFR BLD AUTO: 80.3 % (ref 42.7–76)
NRBC BLD AUTO-RTO: 0 /100 WBC (ref 0–0.2)
PLATELET # BLD AUTO: 293 10*3/MM3 (ref 140–450)
PMV BLD AUTO: 10.1 FL (ref 6–12)
POTASSIUM SERPL-SCNC: 3.9 MMOL/L (ref 3.5–5.2)
PROT SERPL-MCNC: 7.3 G/DL (ref 6–8.5)
RBC # BLD AUTO: 4.17 10*6/MM3 (ref 4.14–5.8)
SODIUM SERPL-SCNC: 138 MMOL/L (ref 136–145)
TROPONIN T DELTA: 3 NG/L
TROPONIN T SERPL HS-MCNC: 120 NG/L
WBC NRBC COR # BLD AUTO: 6.67 10*3/MM3 (ref 3.4–10.8)

## 2024-09-03 PROCEDURE — 93010 ELECTROCARDIOGRAM REPORT: CPT | Performed by: STUDENT IN AN ORGANIZED HEALTH CARE EDUCATION/TRAINING PROGRAM

## 2024-09-03 PROCEDURE — 80053 COMPREHEN METABOLIC PANEL: CPT | Performed by: EMERGENCY MEDICINE

## 2024-09-03 PROCEDURE — 93005 ELECTROCARDIOGRAM TRACING: CPT | Performed by: EMERGENCY MEDICINE

## 2024-09-03 PROCEDURE — 84484 ASSAY OF TROPONIN QUANT: CPT | Performed by: EMERGENCY MEDICINE

## 2024-09-03 PROCEDURE — 99285 EMERGENCY DEPT VISIT HI MDM: CPT

## 2024-09-03 PROCEDURE — 85025 COMPLETE CBC W/AUTO DIFF WBC: CPT | Performed by: EMERGENCY MEDICINE

## 2024-09-03 PROCEDURE — 25010000002 ONDANSETRON PER 1 MG: Performed by: EMERGENCY MEDICINE

## 2024-09-03 PROCEDURE — 25810000003 SODIUM CHLORIDE 0.9 % SOLUTION: Performed by: NURSE PRACTITIONER

## 2024-09-03 PROCEDURE — 36415 COLL VENOUS BLD VENIPUNCTURE: CPT

## 2024-09-03 RX ORDER — ONDANSETRON 2 MG/ML
4 INJECTION INTRAMUSCULAR; INTRAVENOUS EVERY 6 HOURS PRN
Status: DISCONTINUED | OUTPATIENT
Start: 2024-09-03 | End: 2024-09-08 | Stop reason: HOSPADM

## 2024-09-03 RX ORDER — ACETAMINOPHEN 650 MG/1
650 SUPPOSITORY RECTAL EVERY 4 HOURS PRN
Status: DISCONTINUED | OUTPATIENT
Start: 2024-09-03 | End: 2024-09-08 | Stop reason: HOSPADM

## 2024-09-03 RX ORDER — BISACODYL 10 MG
10 SUPPOSITORY, RECTAL RECTAL DAILY PRN
Status: DISCONTINUED | OUTPATIENT
Start: 2024-09-03 | End: 2024-09-08 | Stop reason: HOSPADM

## 2024-09-03 RX ORDER — LABETALOL HYDROCHLORIDE 5 MG/ML
10 INJECTION, SOLUTION INTRAVENOUS
Status: DISCONTINUED | OUTPATIENT
Start: 2024-09-03 | End: 2024-09-08 | Stop reason: HOSPADM

## 2024-09-03 RX ORDER — ACETAMINOPHEN 325 MG/1
650 TABLET ORAL EVERY 4 HOURS PRN
Status: DISCONTINUED | OUTPATIENT
Start: 2024-09-03 | End: 2024-09-08 | Stop reason: HOSPADM

## 2024-09-03 RX ORDER — ASPIRIN 325 MG
325 TABLET ORAL DAILY
Status: DISCONTINUED | OUTPATIENT
Start: 2024-09-04 | End: 2024-09-08 | Stop reason: HOSPADM

## 2024-09-03 RX ORDER — ASPIRIN 300 MG/1
300 SUPPOSITORY RECTAL DAILY
Status: DISCONTINUED | OUTPATIENT
Start: 2024-09-04 | End: 2024-09-08 | Stop reason: HOSPADM

## 2024-09-03 RX ORDER — ATORVASTATIN CALCIUM 80 MG/1
80 TABLET, FILM COATED ORAL NIGHTLY
Status: DISCONTINUED | OUTPATIENT
Start: 2024-09-03 | End: 2024-09-08 | Stop reason: HOSPADM

## 2024-09-03 RX ORDER — SODIUM CHLORIDE 9 MG/ML
75 INJECTION, SOLUTION INTRAVENOUS CONTINUOUS
Status: DISCONTINUED | OUTPATIENT
Start: 2024-09-03 | End: 2024-09-04

## 2024-09-03 RX ORDER — ONDANSETRON 2 MG/ML
4 INJECTION INTRAMUSCULAR; INTRAVENOUS ONCE
Status: COMPLETED | OUTPATIENT
Start: 2024-09-03 | End: 2024-09-03

## 2024-09-03 RX ADMIN — SODIUM CHLORIDE 75 ML/HR: 9 INJECTION, SOLUTION INTRAVENOUS at 23:51

## 2024-09-03 RX ADMIN — ONDANSETRON 4 MG: 2 INJECTION, SOLUTION INTRAMUSCULAR; INTRAVENOUS at 20:41

## 2024-09-03 RX ADMIN — ATORVASTATIN CALCIUM 80 MG: 80 TABLET, FILM COATED ORAL at 23:52

## 2024-09-03 NOTE — ED TRIAGE NOTES
Pt from home via ems, called for dizziness, found to be hypotensive, n/v, took zofran with some relief; started after dialysis, received full dialysis

## 2024-09-04 ENCOUNTER — APPOINTMENT (OUTPATIENT)
Dept: CT IMAGING | Facility: HOSPITAL | Age: 69
DRG: 064 | End: 2024-09-04
Payer: MEDICARE

## 2024-09-04 ENCOUNTER — APPOINTMENT (OUTPATIENT)
Dept: CARDIOLOGY | Facility: HOSPITAL | Age: 69
DRG: 064 | End: 2024-09-04
Payer: MEDICARE

## 2024-09-04 ENCOUNTER — APPOINTMENT (OUTPATIENT)
Dept: MRI IMAGING | Facility: HOSPITAL | Age: 69
DRG: 064 | End: 2024-09-04
Payer: MEDICARE

## 2024-09-04 PROBLEM — Z91.199 MEDICALLY NONCOMPLIANT: Status: ACTIVE | Noted: 2024-09-04

## 2024-09-04 PROBLEM — I63.9 CEREBELLAR STROKE, ACUTE: Status: ACTIVE | Noted: 2024-09-04

## 2024-09-04 LAB
ALBUMIN SERPL-MCNC: 4.1 G/DL (ref 3.5–5.2)
ALBUMIN/GLOB SERPL: 1.6 G/DL
ALP SERPL-CCNC: 84 U/L (ref 39–117)
ALT SERPL W P-5'-P-CCNC: 11 U/L (ref 1–41)
ANION GAP SERPL CALCULATED.3IONS-SCNC: 12 MMOL/L (ref 5–15)
AORTIC DIMENSIONLESS INDEX: 0.9 (DI)
ASCENDING AORTA: 3 CM
AST SERPL-CCNC: 11 U/L (ref 1–40)
BH CV ECHO MEAS - ACS: 1.91 CM
BH CV ECHO MEAS - AO MAX PG: 3.8 MMHG
BH CV ECHO MEAS - AO MEAN PG: 1.81 MMHG
BH CV ECHO MEAS - AO ROOT DIAM: 3.4 CM
BH CV ECHO MEAS - AO V2 MAX: 97.9 CM/SEC
BH CV ECHO MEAS - AO V2 VTI: 19.7 CM
BH CV ECHO MEAS - AVA(I,D): 3 CM2
BH CV ECHO MEAS - EDV(MOD-SP2): 115 ML
BH CV ECHO MEAS - EDV(MOD-SP4): 116 ML
BH CV ECHO MEAS - EF(MOD-BP): 44.7 %
BH CV ECHO MEAS - EF(MOD-SP2): 45.2 %
BH CV ECHO MEAS - EF(MOD-SP4): 44 %
BH CV ECHO MEAS - ESV(MOD-SP2): 63 ML
BH CV ECHO MEAS - ESV(MOD-SP4): 65 ML
BH CV ECHO MEAS - LAT PEAK E' VEL: 6.6 CM/SEC
BH CV ECHO MEAS - LV DIASTOLIC VOL/BSA (35-75): 65.8 CM2
BH CV ECHO MEAS - LV MAX PG: 3.4 MMHG
BH CV ECHO MEAS - LV MEAN PG: 1.66 MMHG
BH CV ECHO MEAS - LV SYSTOLIC VOL/BSA (12-30): 36.9 CM2
BH CV ECHO MEAS - LV V1 MAX: 91.7 CM/SEC
BH CV ECHO MEAS - LV V1 VTI: 17.9 CM
BH CV ECHO MEAS - LVOT AREA: 3.3 CM2
BH CV ECHO MEAS - LVOT DIAM: 2.05 CM
BH CV ECHO MEAS - MED PEAK E' VEL: 5.1 CM/SEC
BH CV ECHO MEAS - MV A DUR: 0.16 SEC
BH CV ECHO MEAS - MV A MAX VEL: 81.8 CM/SEC
BH CV ECHO MEAS - MV DEC SLOPE: 297.7 CM/SEC2
BH CV ECHO MEAS - MV DEC TIME: 0.33 SEC
BH CV ECHO MEAS - MV E MAX VEL: 72.8 CM/SEC
BH CV ECHO MEAS - MV E/A: 0.89
BH CV ECHO MEAS - MV MAX PG: 3 MMHG
BH CV ECHO MEAS - MV MEAN PG: 1.18 MMHG
BH CV ECHO MEAS - MV P1/2T: 67.5 MSEC
BH CV ECHO MEAS - MV V2 VTI: 27.4 CM
BH CV ECHO MEAS - MVA(P1/2T): 3.3 CM2
BH CV ECHO MEAS - MVA(VTI): 2.16 CM2
BH CV ECHO MEAS - PA ACC TIME: 0.12 SEC
BH CV ECHO MEAS - PA V2 MAX: 92.3 CM/SEC
BH CV ECHO MEAS - RAP SYSTOLE: 3 MMHG
BH CV ECHO MEAS - RV MAX PG: 2.02 MMHG
BH CV ECHO MEAS - RV V1 MAX: 71.1 CM/SEC
BH CV ECHO MEAS - RV V1 VTI: 15.5 CM
BH CV ECHO MEAS - SV(LVOT): 59.1 ML
BH CV ECHO MEAS - SV(MOD-SP2): 52 ML
BH CV ECHO MEAS - SV(MOD-SP4): 51 ML
BH CV ECHO MEAS - SVI(LVOT): 33.5 ML/M2
BH CV ECHO MEAS - SVI(MOD-SP2): 29.5 ML/M2
BH CV ECHO MEAS - SVI(MOD-SP4): 28.9 ML/M2
BH CV ECHO MEAS - TAPSE (>1.6): 2.09 CM
BH CV ECHO MEASUREMENTS AVERAGE E/E' RATIO: 12.44
BH CV ECHO SHUNT ASSESSMENT PERFORMED (HIDDEN SCRIPTING): 1
BH CV XLRA - RV BASE: 3.4 CM
BH CV XLRA - RV LENGTH: 9.2 CM
BH CV XLRA - RV MID: 2.8 CM
BH CV XLRA - TDI S': 10.6 CM/SEC
BILIRUB SERPL-MCNC: 0.2 MG/DL (ref 0–1.2)
BUN SERPL-MCNC: 38 MG/DL (ref 8–23)
BUN/CREAT SERPL: 12.1 (ref 7–25)
CALCIUM SPEC-SCNC: 9.1 MG/DL (ref 8.6–10.5)
CHLORIDE SERPL-SCNC: 97 MMOL/L (ref 98–107)
CHOLEST SERPL-MCNC: 182 MG/DL (ref 0–200)
CO2 SERPL-SCNC: 28 MMOL/L (ref 22–29)
CREAT SERPL-MCNC: 3.13 MG/DL (ref 0.76–1.27)
DEPRECATED RDW RBC AUTO: 41.1 FL (ref 37–54)
EGFRCR SERPLBLD CKD-EPI 2021: 20.7 ML/MIN/1.73
ERYTHROCYTE [DISTWIDTH] IN BLOOD BY AUTOMATED COUNT: 13.4 % (ref 12.3–15.4)
GLOBULIN UR ELPH-MCNC: 2.5 GM/DL
GLUCOSE BLDC GLUCOMTR-MCNC: 153 MG/DL (ref 70–130)
GLUCOSE BLDC GLUCOMTR-MCNC: 164 MG/DL (ref 70–130)
GLUCOSE BLDC GLUCOMTR-MCNC: 185 MG/DL (ref 70–130)
GLUCOSE BLDC GLUCOMTR-MCNC: 215 MG/DL (ref 70–130)
GLUCOSE BLDC GLUCOMTR-MCNC: 68 MG/DL (ref 70–130)
GLUCOSE BLDC GLUCOMTR-MCNC: 82 MG/DL (ref 70–130)
GLUCOSE SERPL-MCNC: 185 MG/DL (ref 65–99)
HBA1C MFR BLD: 7.4 % (ref 4.8–5.6)
HCT VFR BLD AUTO: 31.4 % (ref 37.5–51)
HDLC SERPL-MCNC: 53 MG/DL (ref 40–60)
HGB BLD-MCNC: 10.3 G/DL (ref 13–17.7)
LDLC SERPL CALC-MCNC: 112 MG/DL (ref 0–100)
LDLC/HDLC SERPL: 2.07 {RATIO}
LEFT ATRIUM VOLUME INDEX: 27.2 ML/M2
MCH RBC QN AUTO: 28.1 PG (ref 26.6–33)
MCHC RBC AUTO-ENTMCNC: 32.8 G/DL (ref 31.5–35.7)
MCV RBC AUTO: 85.6 FL (ref 79–97)
PLATELET # BLD AUTO: 259 10*3/MM3 (ref 140–450)
PMV BLD AUTO: 10.5 FL (ref 6–12)
POTASSIUM SERPL-SCNC: 4 MMOL/L (ref 3.5–5.2)
PROT SERPL-MCNC: 6.6 G/DL (ref 6–8.5)
QT INTERVAL: 445 MS
QTC INTERVAL: 520 MS
RBC # BLD AUTO: 3.67 10*6/MM3 (ref 4.14–5.8)
SINUS: 3.1 CM
SODIUM SERPL-SCNC: 137 MMOL/L (ref 136–145)
STJ: 2.7 CM
TRIGL SERPL-MCNC: 96 MG/DL (ref 0–150)
TSH SERPL DL<=0.05 MIU/L-ACNC: 3.01 UIU/ML (ref 0.27–4.2)
VLDLC SERPL-MCNC: 17 MG/DL (ref 5–40)
WBC NRBC COR # BLD AUTO: 5.96 10*3/MM3 (ref 3.4–10.8)

## 2024-09-04 PROCEDURE — 97530 THERAPEUTIC ACTIVITIES: CPT

## 2024-09-04 PROCEDURE — 83036 HEMOGLOBIN GLYCOSYLATED A1C: CPT | Performed by: NURSE PRACTITIONER

## 2024-09-04 PROCEDURE — 97166 OT EVAL MOD COMPLEX 45 MIN: CPT

## 2024-09-04 PROCEDURE — 93306 TTE W/DOPPLER COMPLETE: CPT | Performed by: INTERNAL MEDICINE

## 2024-09-04 PROCEDURE — 97535 SELF CARE MNGMENT TRAINING: CPT

## 2024-09-04 PROCEDURE — 97162 PT EVAL MOD COMPLEX 30 MIN: CPT

## 2024-09-04 PROCEDURE — 25510000001 IOPAMIDOL PER 1 ML: Performed by: HOSPITALIST

## 2024-09-04 PROCEDURE — 92610 EVALUATE SWALLOWING FUNCTION: CPT

## 2024-09-04 PROCEDURE — 70450 CT HEAD/BRAIN W/O DYE: CPT

## 2024-09-04 PROCEDURE — 85027 COMPLETE CBC AUTOMATED: CPT | Performed by: NURSE PRACTITIONER

## 2024-09-04 PROCEDURE — 63710000001 INSULIN REGULAR HUMAN PER 5 UNITS: Performed by: HOSPITALIST

## 2024-09-04 PROCEDURE — 80053 COMPREHEN METABOLIC PANEL: CPT | Performed by: NURSE PRACTITIONER

## 2024-09-04 PROCEDURE — 82948 REAGENT STRIP/BLOOD GLUCOSE: CPT

## 2024-09-04 PROCEDURE — 70551 MRI BRAIN STEM W/O DYE: CPT

## 2024-09-04 PROCEDURE — 25510000001 PERFLUTREN 6.52 MG/ML SUSPENSION 2 ML VIAL: Performed by: NURSE PRACTITIONER

## 2024-09-04 PROCEDURE — 80061 LIPID PANEL: CPT | Performed by: NURSE PRACTITIONER

## 2024-09-04 PROCEDURE — 70498 CT ANGIOGRAPHY NECK: CPT

## 2024-09-04 PROCEDURE — 99222 1ST HOSP IP/OBS MODERATE 55: CPT

## 2024-09-04 PROCEDURE — 93306 TTE W/DOPPLER COMPLETE: CPT

## 2024-09-04 PROCEDURE — 36415 COLL VENOUS BLD VENIPUNCTURE: CPT | Performed by: NURSE PRACTITIONER

## 2024-09-04 PROCEDURE — 84443 ASSAY THYROID STIM HORMONE: CPT | Performed by: NURSE PRACTITIONER

## 2024-09-04 PROCEDURE — 70496 CT ANGIOGRAPHY HEAD: CPT

## 2024-09-04 RX ORDER — IBUPROFEN 600 MG/1
1 TABLET ORAL
Status: DISCONTINUED | OUTPATIENT
Start: 2024-09-04 | End: 2024-09-08 | Stop reason: HOSPADM

## 2024-09-04 RX ORDER — IOPAMIDOL 755 MG/ML
100 INJECTION, SOLUTION INTRAVASCULAR
Status: COMPLETED | OUTPATIENT
Start: 2024-09-04 | End: 2024-09-04

## 2024-09-04 RX ORDER — LEVOTHYROXINE SODIUM 75 UG/1
75 TABLET ORAL
Status: DISCONTINUED | OUTPATIENT
Start: 2024-09-05 | End: 2024-09-08 | Stop reason: HOSPADM

## 2024-09-04 RX ORDER — DEXTROSE MONOHYDRATE 25 G/50ML
25 INJECTION, SOLUTION INTRAVENOUS
Status: DISCONTINUED | OUTPATIENT
Start: 2024-09-04 | End: 2024-09-08 | Stop reason: HOSPADM

## 2024-09-04 RX ORDER — BUDESONIDE AND FORMOTEROL FUMARATE DIHYDRATE 160; 4.5 UG/1; UG/1
2 AEROSOL RESPIRATORY (INHALATION)
Status: DISCONTINUED | OUTPATIENT
Start: 2024-09-04 | End: 2024-09-08 | Stop reason: HOSPADM

## 2024-09-04 RX ORDER — NICOTINE POLACRILEX 4 MG
15 LOZENGE BUCCAL
Status: DISCONTINUED | OUTPATIENT
Start: 2024-09-04 | End: 2024-09-08 | Stop reason: HOSPADM

## 2024-09-04 RX ADMIN — IOPAMIDOL 95 ML: 755 INJECTION, SOLUTION INTRAVENOUS at 22:17

## 2024-09-04 RX ADMIN — ATORVASTATIN CALCIUM 80 MG: 80 TABLET, FILM COATED ORAL at 22:55

## 2024-09-04 RX ADMIN — DEXTROSE MONOHYDRATE 25 G: 25 INJECTION, SOLUTION INTRAVENOUS at 23:00

## 2024-09-04 RX ADMIN — PERFLUTREN 2 ML: 6.52 INJECTION, SUSPENSION INTRAVENOUS at 10:42

## 2024-09-04 RX ADMIN — INSULIN HUMAN 2 UNITS: 100 INJECTION, SOLUTION PARENTERAL at 17:23

## 2024-09-04 NOTE — PLAN OF CARE
Goal Outcome Evaluation:         Pt admitted for possible stroke; pt is alert/Ox4; walks with one person assist/ safety belt; dialysis on Thursday; pt still NPO for possible aspiration

## 2024-09-04 NOTE — CONSULTS
Referring Provider: Dr. Card  Reason for Consultation: ESRD    Subjective     Chief complaint   Chief Complaint   Patient presents with    Dizziness    Hypotension       History of present illness: 69-year-old white male with a very complex medical history including end-stage renal disease on dialysis Tuesday Thursday Saturday under the care of Dr. Ramakrishna Blake.  History of diabetes mellitus type 2, multiple CVAs, paroxysmal atrial fibrillation.  Presented to the emergency room last night with lightheadedness, presyncope after dialysis yesterday.  Also had some nausea and vomiting in addition to inability to walk due to balance issues.  In the emergency room he was ataxic.  CT scan demonstrated severe small vessel disease in the cerebral white matter, and old right and left cerebellar infarct.  MRI non-contrasted demonstrated hyperintense focus in the mid left cerebellum thought to be most compatible with a tiny acute mid left cerebellar white matter infarct.  Neurology has evaluated the patient.  They think most likely due to paroxysmal atrial fibrillation and noncompliance with his Eliquis.  They have ordered a CTA of his head and neck.  Other review of systems positive for loose stools the past several days.  No abdominal pain.  He did have nausea and vomiting associated with the dizziness and ataxia yesterday.  Appetite very good and hungry now.  (Had a speech evaluation which Recommended n.p.o. with meds crushed with purée.)  Past Medical History:   Diagnosis Date    Acquired hypothyroidism     Acute sinusitis     SYLVAIN (acute kidney injury)     on CKD    Anemia     Arthritis     Atrial fibrillation     CAD (coronary artery disease)     Chronic combined systolic and diastolic congestive heart failure     COPD (chronic obstructive pulmonary disease)     Depression     Diabetic neuropathy 04/11/2022    Diabetes mellitus due to underlying condition    Esophagitis 06/10/2020    Added automatically from  request for surgery [id number]      ESRD (end stage renal disease) 12/01/2023    Frequent PVCs     Generalized weakness     GERD (gastroesophageal reflux disease)     Helicobacter pylori gastritis 06/04/2020    Hyperlipidemia     Hypertension     Hypertensive encephalopathy     Pleural effusion     Pneumonia     Poor historian     RBBB (right bundle branch block)     Stroke     POOR VISION    TIA (transient ischemic attack)     Type 2 diabetes mellitus     Urinary retention     Vitamin D deficiency      Past Surgical History:   Procedure Laterality Date    APPENDECTOMY N/A 02/14/2016    Dr. Alexey Dodson    ARTERIOVENOUS FISTULA/SHUNT SURGERY Right 06/03/2022    Procedure: RIGHT FOREARM ARTERIOVENOUS FISTULA PLACEMENT RADIOCEPHALIC WITH CEPHALIC VEIN TRANSPOSITION;  Surgeon: Eliseo Willis MD;  Location: [facility] OR;  Service: Vascular;  Laterality: Right;    COLONOSCOPY      over 20 years ago.  no polyps     COLONOSCOPY N/A 09/18/2018    Procedure: COLONOSCOPY;  Surgeon: Jose Enrique Louie MD;  Location: [facility] ENDOSCOPY;  Service: Gastroenterology    COLONOSCOPY N/A 09/19/2018    IH, EH, polyps (TAs w/low grade dysplasia)    COLONOSCOPY N/A 06/01/2020    Procedure: COLONOSCOPY;  Surgeon: Jose Enrique Louie MD;  Location: [facility] ENDOSCOPY;  Service: Gastroenterology;  Laterality: N/A;  Pre op-Anemia, Melena, History of Polyps  Post op-To Cecum/TI, Polyp, Poor Prep    CORONARY ARTERY BYPASS GRAFT  11/2001    ENDOSCOPY N/A 05/29/2020    Procedure: ESOPHAGOGASTRODUODENOSCOPY with biopsies;  Surgeon: Amanda Lovelace MD;  Location: [facility] ENDOSCOPY;  Service: Gastroenterology;  Laterality: N/A;  pre-anemia, dark stools  post-esophagitis, hiatal hernia    ENDOSCOPY N/A 09/15/2020    Procedure: ESOPHAGOGASTRODUODENOSCOPY WITH BIOPSIES;  Surgeon: Jose Enrique Louie MD;  Location: [facility] ENDOSCOPY;  Service: Gastroenterology;  Laterality: N/A;  pre: history of melena and esophagitis  post: mild esophagitis  and gastritis, small hiatal hernia    ENDOSCOPY N/A 2023    Procedure: ESOPHAGOGASTRODUODENOSCOPY with bx;  Surgeon: Quoc Elmore MD;  Location: [facility] ENDOSCOPY;  Service: Gastroenterology;  Laterality: N/A;  pre: Coffee-ground emesis  post: hiatal hernia, esophagitis    HERNIA REPAIR Left 2019    THORACENTESIS      TONSILLECTOMY      VASECTOMY       Family History   Problem Relation Age of Onset    Diabetes Mother     Hypertension Mother     Macular degeneration Mother     Alcohol abuse Father     Cancer Father         lung,  age 65    Heart disease Father     Alcohol abuse Brother     Cirrhosis Brother          age 50    Liver cancer Brother 45    Diabetes Son     Kidney failure Son     Autism Son     Malig Hyperthermia Neg Hx        Social History     Tobacco Use    Smoking status: Never    Smokeless tobacco: Never    Tobacco comments:     CAFFEINE - OCCAS. SODA   Vaping Use    Vaping status: Never Used   Substance Use Topics    Alcohol use: No     Comment: last drink 2 months ago    Drug use: No     Medications Prior to Admission   Medication Sig Dispense Refill Last Dose    apixaban (ELIQUIS) 2.5 MG tablet tablet Take 1 tablet by mouth Every 12 (Twelve) Hours. Indications: Atrial Fibrillation 60 tablet 11 9/3/2024    buPROPion XL (WELLBUTRIN XL) 150 MG 24 hr tablet Take 1 tablet by mouth Daily.   9/3/2024    carvedilol (COREG) 3.125 MG tablet Take 1 tablet by mouth 2 (Two) Times a Day.   9/3/2024    famotidine (PEPCID) 10 MG tablet Take 1 tablet by mouth 2 (Two) Times a Day As Needed for Heartburn.   Past Week    ipratropium-albuterol (COMBIVENT RESPIMAT)  MCG/ACT inhaler Inhale 1 puff 4 (Four) Times a Day As Needed for Wheezing.   Past Week    levothyroxine (SYNTHROID, LEVOTHROID) 75 MCG tablet Take 1 tablet by mouth Daily. 30 tablet 5 9/3/2024    rosuvastatin (CRESTOR) 10 MG tablet Take 1 tablet by mouth Daily.   9/3/2024    tamsulosin (FLOMAX) 0.4 MG capsule  "24 hr capsule Take 1 capsule by mouth Daily.   9/3/2024    aspirin 81 MG EC tablet Take 1 tablet by mouth Daily.       budesonide-formoterol (SYMBICORT) 160-4.5 MCG/ACT inhaler Inhale 2 puffs 2 (Two) Times a Day. 1 each 3     pantoprazole (PROTONIX) 40 MG EC tablet Take 1 tablet by mouth 2 (Two) Times a Day Before Meals.       vitamin D3 125 MCG (5000 UT) capsule capsule Take 1 capsule by mouth Daily.        Allergies:  Prozac [fluoxetine hcl]    Review of Systems  14 points review of system was performed and it was negative other than what noted above in the HPI    Objective     Vital Signs  Temp:  [96.4 °F (35.8 °C)-97.5 °F (36.4 °C)] 97.5 °F (36.4 °C)  Heart Rate:  [63-89] 67  Resp:  [15-18] 18  BP: (110-156)/(57-87) 147/78    Flowsheet Rows      Flowsheet Row First Filed Value   Admission Height 182.9 cm (72\") Documented at 09/03/2024 2022   Admission Weight 58 kg (127 lb 14.4 oz) Documented at 09/03/2024 2022             No intake/output data recorded.  I/O last 3 completed shifts:  In: 500 [I.V.:500]  Out: -     Intake/Output Summary (Last 24 hours) at 9/4/2024 1603  Last data filed at 9/4/2024 1341  Gross per 24 hour   Intake 500 ml   Output --   Net 500 ml       Physical Exam:  Constitutional: Awake alert.  Flat affect.  Chronically ill-appearing.  Slow to answer but voice strong.  HEENT: Oral mucosa dry  Neck: Supple, no JVD  Heart: Regular rate and rhythm no S3 or rub.  Lungs: Clear to auscultation bilaterally.  Abdomen: BS +, soft, nontender and nondistended  : No palpable bladder  Extremities.  No muscle mass.  Right upper extremity AV fistula patent  Neurologic: moving all extremities, slow responses but appropriate.  Skin: Warm and dry      Results Review:  Results for orders placed or performed during the hospital encounter of 09/03/24   Comprehensive Metabolic Panel    Specimen: Blood   Result Value Ref Range    Glucose 232 (H) 65 - 99 mg/dL    BUN 26 (H) 8 - 23 mg/dL    Creatinine 2.48 (H) 0.76 - " 1.27 mg/dL    Sodium 138 136 - 145 mmol/L    Potassium 3.9 3.5 - 5.2 mmol/L    Chloride 97 (L) 98 - 107 mmol/L    CO2 29.3 (H) 22.0 - 29.0 mmol/L    Calcium 9.3 8.6 - 10.5 mg/dL    Total Protein 7.3 6.0 - 8.5 g/dL    Albumin 4.5 3.5 - 5.2 g/dL    ALT (SGPT) 15 1 - 41 U/L    AST (SGOT) 15 1 - 40 U/L    Alkaline Phosphatase 86 39 - 117 U/L    Total Bilirubin 0.2 0.0 - 1.2 mg/dL    Globulin 2.8 gm/dL    A/G Ratio 1.6 g/dL    BUN/Creatinine Ratio 10.5 7.0 - 25.0    Anion Gap 11.7 5.0 - 15.0 mmol/L    eGFR 27.4 (L) >60.0 mL/min/1.73   Single High Sensitivity Troponin T    Specimen: Blood   Result Value Ref Range    HS Troponin T 120 (C) <22 ng/L   CBC Auto Differential    Specimen: Blood   Result Value Ref Range    WBC 6.67 3.40 - 10.80 10*3/mm3    RBC 4.17 4.14 - 5.80 10*6/mm3    Hemoglobin 11.5 (L) 13.0 - 17.7 g/dL    Hematocrit 36.2 (L) 37.5 - 51.0 %    MCV 86.8 79.0 - 97.0 fL    MCH 27.6 26.6 - 33.0 pg    MCHC 31.8 31.5 - 35.7 g/dL    RDW 13.4 12.3 - 15.4 %    RDW-SD 41.5 37.0 - 54.0 fl    MPV 10.1 6.0 - 12.0 fL    Platelets 293 140 - 450 10*3/mm3    Neutrophil % 80.3 (H) 42.7 - 76.0 %    Lymphocyte % 11.2 (L) 19.6 - 45.3 %    Monocyte % 7.3 5.0 - 12.0 %    Eosinophil % 0.4 0.3 - 6.2 %    Basophil % 0.1 0.0 - 1.5 %    Immature Grans % 0.7 (H) 0.0 - 0.5 %    Neutrophils, Absolute 5.34 1.70 - 7.00 10*3/mm3    Lymphocytes, Absolute 0.75 0.70 - 3.10 10*3/mm3    Monocytes, Absolute 0.49 0.10 - 0.90 10*3/mm3    Eosinophils, Absolute 0.03 0.00 - 0.40 10*3/mm3    Basophils, Absolute 0.01 0.00 - 0.20 10*3/mm3    Immature Grans, Absolute 0.05 0.00 - 0.05 10*3/mm3    nRBC 0.0 0.0 - 0.2 /100 WBC   High Sensitivity Troponin T 2Hr    Specimen: Blood   Result Value Ref Range    HS Troponin T 123 (C) <22 ng/L    Troponin T Delta 3 >=-4 - <+4 ng/L   CBC (No Diff)    Specimen: Blood   Result Value Ref Range    WBC 5.96 3.40 - 10.80 10*3/mm3    RBC 3.67 (L) 4.14 - 5.80 10*6/mm3    Hemoglobin 10.3 (L) 13.0 - 17.7 g/dL    Hematocrit 31.4  (L) 37.5 - 51.0 %    MCV 85.6 79.0 - 97.0 fL    MCH 28.1 26.6 - 33.0 pg    MCHC 32.8 31.5 - 35.7 g/dL    RDW 13.4 12.3 - 15.4 %    RDW-SD 41.1 37.0 - 54.0 fl    MPV 10.5 6.0 - 12.0 fL    Platelets 259 140 - 450 10*3/mm3   Comprehensive Metabolic Panel    Specimen: Blood   Result Value Ref Range    Glucose 185 (H) 65 - 99 mg/dL    BUN 38 (H) 8 - 23 mg/dL    Creatinine 3.13 (H) 0.76 - 1.27 mg/dL    Sodium 137 136 - 145 mmol/L    Potassium 4.0 3.5 - 5.2 mmol/L    Chloride 97 (L) 98 - 107 mmol/L    CO2 28.0 22.0 - 29.0 mmol/L    Calcium 9.1 8.6 - 10.5 mg/dL    Total Protein 6.6 6.0 - 8.5 g/dL    Albumin 4.1 3.5 - 5.2 g/dL    ALT (SGPT) 11 1 - 41 U/L    AST (SGOT) 11 1 - 40 U/L    Alkaline Phosphatase 84 39 - 117 U/L    Total Bilirubin 0.2 0.0 - 1.2 mg/dL    Globulin 2.5 gm/dL    A/G Ratio 1.6 g/dL    BUN/Creatinine Ratio 12.1 7.0 - 25.0    Anion Gap 12.0 5.0 - 15.0 mmol/L    eGFR 20.7 (L) >60.0 mL/min/1.73   Hemoglobin A1c    Specimen: Blood   Result Value Ref Range    Hemoglobin A1C 7.40 (H) 4.80 - 5.60 %   Lipid Panel    Specimen: Blood   Result Value Ref Range    Total Cholesterol 182 0 - 200 mg/dL    Triglycerides 96 0 - 150 mg/dL    HDL Cholesterol 53 40 - 60 mg/dL    LDL Cholesterol  112 (H) 0 - 100 mg/dL    VLDL Cholesterol 17 5 - 40 mg/dL    LDL/HDL Ratio 2.07    TSH    Specimen: Blood   Result Value Ref Range    TSH 3.010 0.270 - 4.200 uIU/mL   POC Glucose Once    Specimen: Blood   Result Value Ref Range    Glucose 215 (H) 70 - 130 mg/dL   POC Glucose Once    Specimen: Blood   Result Value Ref Range    Glucose 153 (H) 70 - 130 mg/dL   POC Glucose Once    Specimen: Blood   Result Value Ref Range    Glucose 164 (H) 70 - 130 mg/dL   POC Glucose Once    Specimen: Blood   Result Value Ref Range    Glucose 185 (H) 70 - 130 mg/dL   ECG 12 Lead Other; Dizziness   Result Value Ref Range    QT Interval 445 ms    QTC Interval 520 ms   Adult transthoracic echo complete   Result Value Ref Range    EF(MOD-bp) 44.7 %    LV  Sys Vol (BSA corrected) 36.9 cm2    LV Kingsley Vol (BSA corrected) 65.8 cm2    LVOT area 3.3 cm2    LVOT diam 2.05 cm    EDV(MOD-sp2) 115.0 ml    EDV(MOD-sp4) 116.0 ml    ESV(MOD-sp2) 63.0 ml    ESV(MOD-sp4) 65.0 ml    SV(MOD-sp2) 52.0 ml    SV(MOD-sp4) 51.0 ml    SVi(MOD-SP2) 29.5 ml/m2    SVi(MOD-SP4) 28.9 ml/m2    SVi (LVOT) 33.5 ml/m2    EF(MOD-sp2) 45.2 %    EF(MOD-sp4) 44.0 %    MV E max christo 72.8 cm/sec    MV A max christo 81.8 cm/sec    MV dec time 0.33 sec    MV E/A 0.89     MV A dur 0.16 sec    LA ESV Index (BP) 27.2 ml/m2    Med Peak E' Christo 5.1 cm/sec    Lat Peak E' Christo 6.6 cm/sec    Avg E/e' ratio 12.44     SV(LVOT) 59.1 ml    RV Base 3.4 cm    RV Mid 2.8 cm    RV Length 9.2 cm    TAPSE (>1.6) 2.09 cm    RV S' 10.6 cm/sec    LV V1 max 91.7 cm/sec    LV V1 max PG 3.4 mmHg    LV V1 mean PG 1.66 mmHg    LV V1 VTI 17.9 cm    Ao pk christo 97.9 cm/sec    Ao max PG 3.8 mmHg    Ao mean PG 1.81 mmHg    Ao V2 VTI 19.7 cm    CHELSEA(I,D) 3.0 cm2    MV max PG 3.0 mmHg    MV mean PG 1.18 mmHg    MV V2 VTI 27.4 cm    MV P1/2t 67.5 msec    MVA(P1/2t) 3.3 cm2    MVA(VTI) 2.16 cm2    MV dec slope 297.7 cm/sec2    RAP systole 3.0 mmHg    RV V1 max PG 2.02 mmHg    RV V1 max 71.1 cm/sec    RV V1 VTI 15.5 cm    PA V2 max 92.3 cm/sec    PA acc time 0.12 sec    Ao root diam 3.4 cm    ACS 1.91 cm    Sinus 3.1 cm    STJ 2.7 cm    BH CV ECHO SHUNT ASSESSMENT PERFORMED (HIDDEN SCRIPTING) 1     Dimensionless Index 0.90 (DI)    Ascending aorta 3.0 cm     Imaging Results (Last 72 Hours)       Procedure Component Value Units Date/Time    MRI Brain Without Contrast [[id number]] Collected: 09/04/24 1232     Updated: 09/04/24 1232    Narrative:      STAT MRI OF THE BRAIN WITHOUT CONTRAST ON 09/04/2024     CLINICAL HISTORY: This is a 69-year-old male patient who has a prior  history of stroke and has acute dizziness after dialysis on 09/03/2024,  evaluate for acute stroke.     TECHNIQUE: Axial T1, FLAIR, fat-suppressed T2, axial diffusion and  gradient  echo T2 and sagittal T1-weighted images were obtained of the  entire head.     This is correlated to a prior MRI of the brain from [facility] on 09/17/2022.     FINDINGS: There is extensive patchy and confluent T2 high signal  throughout the periventricular and subcortical white matter of the  cerebral hemispheres compatible with severe small vessel disease. There  are tiny old lacunar infarcts in the mid right corona radiata region and  in the thalami bilaterally. There is a 2.5 cm old superior parietal  infarct in the left MCA territory. There is a 2.4 x 1.1 cm old superior  medial right cerebellar infarct and an additional 1.8 x 0.5 cm old  superior right vermian cerebellar infarct in the right superior  cerebellar artery territory. There is a 7 x 2 mm old posterior inferior  lateral right cerebellar infarct in the right PICA territory and there  is a 14 x 4 mm old posterior inferior lateral left cerebellar infarct in  the left PICA territory, and these are all unchanged since prior MRI  09/17/2022. There is an ovoid area of intense increased signal intensity  on the diffusion weighted images projecting over the mid left cerebellar  white matter that measures 5 mm in size. It is a little dark on the ADC  maps and bright on the FLAIR images and is compatible with a tiny acute  mid left cerebellar white matter infarct. There is mild diffuse cerebral  atrophy.  The lateral and third ventricles are mildly prominent in size,  felt to be on the basis of central volume loss or atrophy. I see no mass  effect and no midline shift. No extra-axial fluid collections are  identified. Calvarium and skull base are normal in appearance. There is  a 2.5 cm mucous retention cyst in the posterior right maxillary sinus.  Otherwise, the paranasal sinuses and the mastoid air cells and the  middle ear cavities are clear. There are bilateral intraocular lens  implants in the globes from previous cataract surgery,  otherwise the  orbits are normal in appearance. There is a tiny punctate 2 mm focus of  signal loss on the gradient echo T2 weighted images in the right lateral  malcolm, likely a punctate area of hemosiderin deposition due to an old  hypertensive microhemorrhage and there is a 4 mm rounded focus of signal  loss in the inferior medial left cerebellum on the gradient echo T2  weighted images compatible with an additional focus of hemosiderin  deposition probably secondary to old hypertensive microhemorrhages.   There is a cluster of nodular foci of signal loss in the right and left  thalami compatible with multiple punctate old microhemorrhages in the  thalami bilaterally likely secondary to hypertension.  There is a 5 mm  rounded focus of signal loss in the superior lateral right occipital  juxtacortical white matter compatible with a tiny old microhemorrhage at  this site and given this location it could be secondary to amyloid.        Impression:      1. Since the prior MRI of the brain on 09/17/2022 there has been  development of a 5 mm rounded diffusion hyperintense focus in the mid  left cerebellum.  This is most compatible with a tiny acute mid left  cerebellar white matter infarct. Otherwise, there has been no  significant change.     2. There is severe small vessel disease in the cerebral white matter.  There are old lacunar infarcts in the mid right corona radiata region  and the thalami bilaterally. There are multiple old bilateral cerebellar  infarcts including a 2.4 x 1.1 cm old superior medial right cerebellar  infarct and an additional 1.8 x 0.5 cm old superior right cerebellar  vermian infarct and these are both in the right superior cerebellar  artery territory. There is an additional 7 x 2 mm old posterior inferior  lateral right cerebellar infarct in the right PICA territory and there  is a 14 x 4 mm old posterior inferior lateral left cerebellar infarct in  the left PICA territory.     3. There are  multiple punctate foci of hemosiderin deposition from tiny  old microhemorrhages with 4 in the left thalamus and 4 in the right  thalamus, and a 2 mm focus in the right side of the malcolm and these are  good locations for old hypertensive microhemorrhages. There is a 4 mm  focus in the inferior medial left cerebellum and a 4-5 mm rounded focus  in the superior lateral right occipital juxtacortical white matter that  could be secondary to amyloid. There is diffuse cerebral atrophy. The  remainder of the MRI of the brain is normal. I communicated the results  to Eliseo Card MD, by telephone on 09/04/2024 at 11:50 a.m.       CT Head Without Contrast [[id number]] Collected: 09/04/24 0835     Updated: 09/04/24 0835    Narrative:      EMERGENCY CT SCAN OF THE HEAD WITHOUT CONTRAST 09/04/2024     CLINICAL HISTORY: Ataxia and dizziness.     TECHNIQUE: Spiral CT images were obtained from the base of the skull to  the vertex without intravenous contrast. The images were reformatted and  are submitted in 3 mm thick axial, sagittal and coronal CT sections with  brain algorithm.     This is correlated to a prior head CT from [facility] on  01/04/2024 and a prior MRI of the brain on 09/17/2022.     FINDINGS: There are extensive patchy and confluent areas of low-density  throughout the cerebral white matter consistent with severe small vessel  disease. There is a chronic area of encephalomalacia in the superior  left parietal cortex measuring 2.5 x 1.8 cm in size compatible with an  old superior left parietal cortical infarct, unchanged dating back to  MRI of the brain 09/17/2022. It is in the left MCA territory.  Furthermore there is a 2.7 x 1.3 cm chronic infarct in the superomedial  right cerebellum in the right superior cerebellar artery territory and  there is a 18 x 5 mm chronic infarct in the posterolateral left  cerebellum in the left PICA territory, unchanged since MRI of the brain  09/17/2022. There  are tiny old lacunar infarcts in the thalami  bilaterally unchanged. There is diffuse cerebral atrophy. The ventricles  are normal in size. I see no mass effect and no midline shift and no  extra-axial fluid collections are identified and there is no evidence of  acute intracranial hemorrhage. There is a 2.5 cm mucous retention cyst  in the posterior right maxillary sinus. The remainder of the paranasal  sinuses, mastoid air cells and middle ear cavities are clear. The  calvarium and skull base are normal in appearance. There is a chronic  sebaceous cyst in the posterior superior left parietal scalp measuring  18 x 14 mm in size.       Impression:      1. No convincing acute intracranial abnormality is identified with no  discernible change when compared to prior MRI of the brain on  09/17/2022.     2. There is severe small vessel disease in the cerebral white matter.  There is a 2.5 x 1.8 cm old superior left parietal infarct in the left  MCA territory. There is a 2.7 x 1.3 cm old superomedial right cerebellar  infarct in the right superior cerebellar artery territory and there is  an 18 x 5 mm old posterolateral left cerebellar infarct in the left PICA  territory and there are small old lacunar infarcts in the thalami  bilaterally. There is diffuse cerebral atrophy.     3. There is a 2.5 cm mucous retention cyst in the posterior right  maxillary sinus and there is a sebaceous cyst in the posterior superior  left parietal scalp measuring 18 x 14 x 19 mm in size. The remainder of  the head CT is normal. The etiology of the ataxia and dizziness is not  established on this exam and if there remains any clinical suspicion of  an acute infarct, I recommend an MRI of the brain for more complete  assessment. The findings and recommendations were communicated to  Eliseo Johnson MD, by telephone on 09/04/2024 at 8:20 a.m.     Radiation dose reduction techniques were utilized, including automated  exposure control and  exposure modulation based on body size.                    aspirin, 325 mg, Oral, Daily   Or  aspirin, 300 mg, Rectal, Daily  atorvastatin, 80 mg, Oral, Nightly  budesonide-formoterol, 2 puff, Inhalation, BID - RT  insulin regular, 2-7 Units, Subcutaneous, Q6H      sodium chloride, 75 mL/hr, Last Rate: 75 mL/hr (09/04/24 0158)        Assessment & Plan   ESRD.  Dialysis Tuesday Thursday Saturday.  Will plan dialysis here tomorrow.  Chemistries and volume status acceptable.  Acute ischemic left cerebellar CVA.  Likely due to paroxysmal atrial fibrillation and noncompliance with Eliquis.  CTA head and neck ordered.  Recurrent strokes.  Not compliant with prescribed Eliquis, aspirin and statin.  Concern for aspiration risk noted by speech.  Paroxysmal atrial fibrillation  Diabetes mellitus type 2  Hypothyroid on replacement  Chronic combined heart failure .  EF on echocardiogram 44%.  Hypertension, chronic kidney disease.  Permissive hypertension in the next 24 to 48 hours.  Anemia of chronic kidney disease  10.  Loose stools.  Will monitor in the hospital.  Plan:  Hemodialysis tomorrow.      I discussed the patient's findings and my recommendations with the patient and family.      Zahida Hsu MD  09/04/24  16:03 EDT    Please note that portions of this note were completed with a voice recognition program.

## 2024-09-04 NOTE — PLAN OF CARE
Goal Outcome Evaluation:  Plan of Care Reviewed With: (P) patient           Outcome Evaluation: (P) Pt is a 69 year old male admitted with acute dizziness and hypotension. He lives at home with his wife and children and was independent in ADLs prior to admission. He reports no steps to enter the side door of his house, but he lives in a trilevel home with several stairs inside. He uses a cane for AMB at baseline. He reported multiple falls in the last 6 months. Upon examination, pt demonstrated decreased balance, strength, and activity tolerance. He sat up to EOB with SBA and stood with min A x 1 and rolling walker. He mentioned increased dizziness when moving from sitting to standing. He AMB 40 ft with min A x 1 and rolling walker. He demonstrated slow pace and unsteadiness but did not have any loss of balance. Following AMB he was assisted to his recliner and left with SLP. Pt would benefit from skilled PT to address stated deficits. PT recommends SNF at d/c.      Anticipated Discharge Disposition (PT): (P) skilled nursing facility

## 2024-09-04 NOTE — ED NOTES
Nursing report ED to floor  Carlito Mo  69 y.o.  male    HPI :  HPI (Adult)  Stated Reason for Visit: dizzy  History Obtained From: EMS    Chief Complaint  Chief Complaint   Patient presents with    Dizziness    Hypotension       Admitting doctor:   Mirza Taylor DO    Admitting diagnosis:   The encounter diagnosis was Ataxia.    Code status:   Current Code Status       Date Active Code Status Order ID Comments User Context       9/3/2024 2336 CPR (Attempt to Resuscitate) [id number]  Ashley Lee, APRN ED        Question Answer    Code Status (Patient has no pulse and is not breathing) CPR (Attempt to Resuscitate)    Medical Interventions (Patient has pulse or is breathing) Full Support                    Allergies:   Prozac [fluoxetine hcl]    Isolation:   No active isolations    Intake and Output    Intake/Output Summary (Last 24 hours) at 9/4/2024 0039  Last data filed at 9/3/2024 1930  Gross per 24 hour   Intake 500 ml   Output --   Net 500 ml       Weight:       09/03/24 2022   Weight: 58 kg (127 lb 14.4 oz)       Most recent vitals:   Vitals:    09/03/24 2137 09/03/24 2203 09/03/24 2301 09/03/24 2331   BP:  152/72 156/71 110/58   Pulse: 63 77 80 82   Resp:       Temp:       SpO2: 100% 99% 100% 99%   Weight:       Height:           Active LDAs/IV Access:   Lines, Drains & Airways       Active LDAs       Name Placement date Placement time Site Days    Peripheral IV 09/03/24 1934 Left Antecubital 09/03/24 1934  Antecubital  less than 1                    Labs (abnormal labs have a star):   Labs Reviewed   COMPREHENSIVE METABOLIC PANEL - Abnormal; Notable for the following components:       Result Value    Glucose 232 (*)     BUN 26 (*)     Creatinine 2.48 (*)     Chloride 97 (*)     CO2 29.3 (*)     eGFR 27.4 (*)     All other components within normal limits    Narrative:     GFR Normal >60  Chronic Kidney Disease <60  Kidney Failure <15     SINGLE HS TROPONIN T - Abnormal; Notable for the following  components:    HS Troponin T 120 (*)     All other components within normal limits    Narrative:     High Sensitive Troponin T Reference Range:  <14.0 ng/L- Negative Female for AMI  <22.0 ng/L- Negative Male for AMI  >=14 - Abnormal Female indicating possible myocardial injury.  >=22 - Abnormal Male indicating possible myocardial injury.   Clinicians would have to utilize clinical acumen, EKG, Troponin, and serial changes to determine if it is an Acute Myocardial Infarction or myocardial injury due to an underlying chronic condition.        CBC WITH AUTO DIFFERENTIAL - Abnormal; Notable for the following components:    Hemoglobin 11.5 (*)     Hematocrit 36.2 (*)     Neutrophil % 80.3 (*)     Lymphocyte % 11.2 (*)     Immature Grans % 0.7 (*)     All other components within normal limits   HIGH SENSITIVITIY TROPONIN T 2HR - Abnormal; Notable for the following components:    HS Troponin T 123 (*)     All other components within normal limits    Narrative:     High Sensitive Troponin T Reference Range:  <14.0 ng/L- Negative Female for AMI  <22.0 ng/L- Negative Male for AMI  >=14 - Abnormal Female indicating possible myocardial injury.  >=22 - Abnormal Male indicating possible myocardial injury.   Clinicians would have to utilize clinical acumen, EKG, Troponin, and serial changes to determine if it is an Acute Myocardial Infarction or myocardial injury due to an underlying chronic condition.        CBC (NO DIFF)   COMPREHENSIVE METABOLIC PANEL   HEMOGLOBIN A1C   LIPID PANEL   TSH   POCT GLUCOSE FINGERSTICK   POCT GLUCOSE FINGERSTICK   POCT GLUCOSE FINGERSTICK   POCT GLUCOSE FINGERSTICK   POCT GLUCOSE FINGERSTICK   CBC AND DIFFERENTIAL    Narrative:     The following orders were created for panel order CBC & Differential.  Procedure                               Abnormality         Status                     ---------                               -----------         ------                     CBC Auto  Differential[[id number]]        Abnormal            Final result                 Please view results for these tests on the individual orders.       EKG:   ECG 12 Lead Other; Dizziness   Preliminary Result   HEART RATE=82  bpm   RR Mfpotmau=964  ms   ME Jyucufrc=302  ms   P Horizontal Axis=  deg   P Front Axis=0  deg   QRSD Alkggpmg=091  ms   QT Exyzyisk=102  ms   OBnL=725  ms   QRS Axis=-73  deg   T Wave Axis=73  deg   - ABNORMAL ECG -   Sinus rhythm   Atrial premature complex   Right bundle branch block   Anterior infarct, age indeterminate   Date and Time of Study:2024-09-03 20:09:52          Meds given in ED:   Medications   sodium chloride 0.9 % infusion (75 mL/hr Intravenous New Bag 9/3/24 2351)   acetaminophen (TYLENOL) tablet 650 mg (has no administration in time range)     Or   acetaminophen (TYLENOL) suppository 650 mg (has no administration in time range)   labetalol (NORMODYNE,TRANDATE) injection 10 mg (has no administration in time range)   aspirin tablet 325 mg (has no administration in time range)     Or   aspirin suppository 300 mg (has no administration in time range)   atorvastatin (LIPITOR) tablet 80 mg (80 mg Oral Given 9/3/24 2352)   ondansetron (ZOFRAN) injection 4 mg (has no administration in time range)   bisacodyl (DULCOLAX) suppository 10 mg (has no administration in time range)   ondansetron (ZOFRAN) injection 4 mg (4 mg Intravenous Given 9/3/24 2041)       Imaging results:  No radiology results for the last day    Ambulatory status:   - assist     Social issues:   Social History     Socioeconomic History    Marital status:      Spouse name: Lissette    Number of children: 3    Years of education: college   Tobacco Use    Smoking status: Never    Smokeless tobacco: Never    Tobacco comments:     CAFFEINE - OCCAS. SODA   Vaping Use    Vaping status: Never Used   Substance and Sexual Activity    Alcohol use: No     Comment: last drink 2 months ago    Drug use: No    Sexual activity: Defer        Peripheral Neurovascular  Peripheral Neurovascular (Adult)  Peripheral Neurovascular WDL: .WDL except  Capillary Refill, General: less than/equal to 3 secs  Capillary Refill, LUE: less than/equal to 3 secs  Capillary Refill, RUE: less than/equal to 3 secs  Capillary Refill, LLE: less than/equal to 3 secs  Capillary Refill, RLE: less than/equal to 3 secs  LUE Neurovascular Assessment  Temperature LUE: warm  Color LUE: no discoloration  Sensation LUE: no numbness, no tingling, no tenderness  RUE Neurovascular Assessment  Temperature RUE: warm  Color RUE: no discoloration  Sensation RUE: no numbness, no tingling, no tenderness  LLE Neurovascular Assessment  Temperature LLE: warm  Color LLE: no discoloration  Sensation LLE: no tenderness, numbness present, tingling present  RLE Neurovascular Assessment  Temperature RLE: warm  Color RLE: no discoloration  Sensation RLE: no tenderness, tingling present, numbness present    Neuro Cognitive  Neuro Cognitive (Adult)  Cognitive/Neuro/Behavioral WDL: WDL, all  Orientation: oriented x 4    Learning  Learning Assessment (Adult)  Learning Readiness and Ability: no barriers identified  Education Provided  Person Taught: patient    Respiratory  Respiratory WDL  Respiratory WDL: .WDL except, all  Rhythm/Pattern, Respiratory: shortness of breath, depth regular, pattern regular, unlabored    Abdominal Pain       Pain Assessments  Pain (Adult)  (0-10) Pain Rating: Rest: 4    NIH Stroke Scale       Louise Edward RN  09/04/24 00:39 EDT

## 2024-09-04 NOTE — ED PROVIDER NOTES
EMERGENCY DEPARTMENT ENCOUNTER  Room Number:  16/16  PCP: Belle Simons APRN  Independent Historians: Patient, EMS who brought patient      HPI:  Chief Complaint: had concerns including Dizziness and Hypotension.     A complete HPI/ROS/PMH/PSH/SH/FH are unobtainable due to:   Chronic or social conditions impacting patient care (Social Determinants of Health):       Context: Carlito Mo is a 69 y.o. male with a medical history of end-stage renal disease on dialysis, hypertension, hyperlipidemia and diabetes who presents to the ED c/o acute dizziness.  Patient felt dizziness and lightheadedness after coming home from dialysis.  Symptoms are somewhat better at this point.  He does report some nausea and has had vomiting x 2.  Denies abdominal pain.  Denies chest pain or trouble breathing.  He states he has home oxygen but has not been working for several years.  He did go to dialysis today and had full dialysis.  Usually goes to dialysis Tuesday Thursday Saturday.      Review of prior external notes (non-ED) -and- Review of prior external test results outside of this encounter:   I reviewed prior medical records including most recent admission from May of this year when patient was brought in for generalized weakness.  Chronic conditions include end-stage renal disease on dialysis, A-fib on Eliquis, coronary artery disease, diabetes, hypertension and hyperlipidemia      Prescription drug monitoring program review:         PAST MEDICAL HISTORY  Active Ambulatory Problems     Diagnosis Date Noted   • Major depressive disorder with single episode, in partial remission    • Other hyperlipidemia    • Type 2 diabetes mellitus, with long-term current use of insulin    • Vitamin D deficiency 12/17/2015   • Erectile dysfunction 12/17/2015   • Chronic fatigue 04/25/2016   • Type 2 diabetes mellitus with ophthalmic complication 04/25/2016   • Benign prostatic hyperplasia with nocturia 12/20/2016   • History of basal cell  carcinoma 12/20/2016   • Acquired hypothyroidism 12/20/2017   • Recurrent strokes 02/20/2018   • Suspected sleep apnea 10/29/2018   • YAW (obstructive sleep apnea) 12/13/2018   • Coronary artery disease involving native coronary artery of native heart without angina pectoris 04/18/2019   • Chronically elevated troponin 07/02/2019   • Colon cancer screening 10/30/2018   • Enlarged lymph nodes 10/30/2018   • Functional gait abnormality 10/30/2018   • History of myocardial infarction 02/06/2019   • Insomnia 02/06/2019   • Iron deficiency anemia 10/02/2018   • Major depression, recurrent 10/16/2012   • Essential hypertension 03/10/2020   • Acute on chronic renal insufficiency 05/25/2020   • Falls frequently 05/25/2020   • Acute on chronic anemia 05/25/2020   • Neurogenic orthostatic hypotension 05/30/2020   • Anemia, chronic disease 05/25/2020   • History of colon polyps 05/25/2020   • Helicobacter pylori gastritis 06/04/2020   • Esophagitis 06/10/2020   • Embolic stroke 05/20/2021   • Patent foramen ovale 05/22/2021   • Cardiomyopathy 05/24/2021   • Diarrhea 12/14/2021   • Generalized weakness 03/01/2022   • Paroxysmal atrial fibrillation 07/25/2022   • Chronic anticoagulation 07/25/2022   • Acute ischemic stroke 09/17/2022   • History of stroke 05/02/2023   • Iron deficiency anemia 05/04/2023   • Acute HFrEF (heart failure with reduced ejection fraction) 05/05/2023   • ESRD (end stage renal disease) 12/01/2023   • Hemoptysis 12/01/2023   • Chronic HFrEF (heart failure with reduced ejection fraction) 12/11/2023   • Hemodialysis patient 04/10/2024   • Exertional dyspnea 05/23/2024   • Severe malnutrition 05/29/2024     Resolved Ambulatory Problems     Diagnosis Date Noted   • Anemia    • Arthritis    • Diabetes mellitus out of control    • Acute sinusitis    • Accumulation of fluid in tissues 12/17/2015   • Fatigue 12/17/2015   • Cardiomyopathy, ischemic 12/17/2015   • Acute appendicitis 03/02/2016   • Skin lesion of  chest wall 06/20/2016   • Slow transit constipation 09/01/2016   • Atherosclerosis of coronary artery 10/16/2012   • High blood pressure associated with diabetes 12/20/2016   • Altered mental status 11/30/2017   • Hypertensive urgency 02/16/2018   • Hypertensive encephalopathy syndrome 04/30/2018   • Noncompliance w/medication treatment due to intermit use of medication 05/04/2018   • Hyperglycemia 05/04/2018   • Screening for colorectal cancer 08/22/2018   • Constipation 08/22/2018   • Urinary retention 09/20/2018   • Pneumonia 09/21/2018   • Chronic combined systolic and diastolic CHF (congestive heart failure) 09/21/2018   • Acute respiratory failure with hypoxia 09/21/2018   • Pleural effusion 12/01/2018   • Snoring 12/13/2018   • Hospital discharge follow-up 05/31/2019   • Adverse effect of iron and its compounds, initial encounter 05/23/2020   • Debility 05/25/2020   • Acute pain of right shoulder 05/27/2020   • Lower abdominal pain 05/27/2020   • Heme positive stool 05/25/2020   • Sleep related hypoxia 07/23/2020   • Pleural effusion, bilateral 05/22/2021   • Lower abdominal pain 12/13/2021   • CKD (chronic kidney disease) stage 4, GFR 15-29 ml/min 12/16/2021   • Hypertensive emergency 02/01/2022   • Hypertension not at goal 02/02/2022   • Memory loss due to medical condition 02/08/2022   • ERRONEOUS ENCOUNTER--DISREGARD 03/09/2022   • Weakness 06/03/2022   • Multiple falls 09/15/2022   • Dehydration 09/16/2022   • SYLVAIN (acute kidney injury) 09/16/2022   • DE LEON (dyspnea on exertion) 03/07/2023   • Shortness of breath 03/08/2023   • Acute combined systolic and diastolic congestive heart failure 05/02/2023   • Atrial fibrillation with rapid ventricular response 11/29/2023   • Nausea & vomiting 12/01/2023   • Coffee ground emesis 11/28/2023   • Severe malnutrition 12/09/2023   • Syncope 01/04/2024   • Abnormal CT of the chest 01/04/2024     Past Medical History:   Diagnosis Date   • Atrial fibrillation    • CAD  (coronary artery disease)    • Chronic combined systolic and diastolic congestive heart failure    • COPD (chronic obstructive pulmonary disease)    • Depression    • Diabetic neuropathy 04/11/2022   • Frequent PVCs    • GERD (gastroesophageal reflux disease)    • Hyperlipidemia    • Hypertension    • Hypertensive encephalopathy    • Poor historian    • RBBB (right bundle branch block)    • Stroke    • TIA (transient ischemic attack)    • Type 2 diabetes mellitus          PAST SURGICAL HISTORY  Past Surgical History:   Procedure Laterality Date   • APPENDECTOMY N/A 02/14/2016    Dr. Alexey Dodson   • ARTERIOVENOUS FISTULA/SHUNT SURGERY Right 06/03/2022    Procedure: RIGHT FOREARM ARTERIOVENOUS FISTULA PLACEMENT RADIOCEPHALIC WITH CEPHALIC VEIN TRANSPOSITION;  Surgeon: Eliseo Willis MD;  Location: [facility] MAIN OR;  Service: Vascular;  Laterality: Right;   • COLONOSCOPY      over 20 years ago.  no polyps    • COLONOSCOPY N/A 09/18/2018    Procedure: COLONOSCOPY;  Surgeon: Jose Enrique Louie MD;  Location: [facility] ENDOSCOPY;  Service: Gastroenterology   • COLONOSCOPY N/A 09/19/2018    IH, EH, polyps (TAs w/low grade dysplasia)   • COLONOSCOPY N/A 06/01/2020    Procedure: COLONOSCOPY;  Surgeon: Jose Enrique Louie MD;  Location: [facility] ENDOSCOPY;  Service: Gastroenterology;  Laterality: N/A;  Pre op-Anemia, Melena, History of Polyps  Post op-To Cecum/TI, Polyp, Poor Prep   • CORONARY ARTERY BYPASS GRAFT  11/2001   • ENDOSCOPY N/A 05/29/2020    Procedure: ESOPHAGOGASTRODUODENOSCOPY with biopsies;  Surgeon: Amanda Lovelace MD;  Location: [facility] ENDOSCOPY;  Service: Gastroenterology;  Laterality: N/A;  pre-anemia, dark stools  post-esophagitis, hiatal hernia   • ENDOSCOPY N/A 09/15/2020    Procedure: ESOPHAGOGASTRODUODENOSCOPY WITH BIOPSIES;  Surgeon: Jose Enrique Louie MD;  Location: [facility] ENDOSCOPY;  Service: Gastroenterology;  Laterality: N/A;  pre: history of melena and esophagitis  post: mild esophagitis  and gastritis, small hiatal hernia   • ENDOSCOPY N/A 2023    Procedure: ESOPHAGOGASTRODUODENOSCOPY with bx;  Surgeon: Quoc Elmore MD;  Location: [facility] ENDOSCOPY;  Service: Gastroenterology;  Laterality: N/A;  pre: Coffee-ground emesis  post: hiatal hernia, esophagitis   • HERNIA REPAIR Left 2019   • THORACENTESIS     • TONSILLECTOMY     • VASECTOMY           FAMILY HISTORY  Family History   Problem Relation Age of Onset   • Diabetes Mother    • Hypertension Mother    • Macular degeneration Mother    • Alcohol abuse Father    • Cancer Father         lung,  age 65   • Heart disease Father    • Alcohol abuse Brother    • Cirrhosis Brother          age 50   • Liver cancer Brother 45   • Diabetes Son    • Kidney failure Son    • Autism Son    • Malig Hyperthermia Neg Hx          SOCIAL HISTORY  Social History     Socioeconomic History   • Marital status:      Spouse name: Lissette   • Number of children: 3   • Years of education: college   Tobacco Use   • Smoking status: Never   • Smokeless tobacco: Never   • Tobacco comments:     CAFFEINE - OCCAS. SODA   Vaping Use   • Vaping status: Never Used   Substance and Sexual Activity   • Alcohol use: No     Comment: last drink 2 months ago   • Drug use: No   • Sexual activity: Defer         ALLERGIES  Prozac [fluoxetine hcl]      REVIEW OF SYSTEMS  Review of Systems   Gastrointestinal:  Positive for nausea and vomiting (X 2 today).   Neurological:  Positive for dizziness and light-headedness.     Included in HPI  All systems reviewed and negative except for those discussed in HPI.      PHYSICAL EXAM    I have reviewed the triage vital signs and nursing notes.    ED Triage Vitals [24 1933]   Temp Heart Rate Resp BP SpO2   96.4 °F (35.8 °C) 71 16 146/83 100 %      Temp src Heart Rate Source Patient Position BP Location FiO2 (%)   -- -- -- -- --       Physical Exam  GENERAL: Chronically ill-appearing male in no obvious distress.   Triage vitals reviewed notable for initial blood pressure 146/83.  Heart rate 71.  O2 sats 100% on 2 L nasal cannula.  Temperature 96.5  SKIN: Warm, dry  HENT: Normocephalic, atraumatic  EYES: no scleral icterus  CV: regular rhythm, regular rate-no murmur  RESPIRATORY: normal effort, lungs clear-O2 sats 100% on 2 L nasal cannula  ABDOMEN: soft, mild epigastric tenderness, nondistended  MUSCULOSKELETAL: no deformity  NEURO: alert, moves all extremities, follows commands  Strength-grossly intact with mild generalized weakness and no focal deficit  Sensation-grossly intact  Coordination-grossly intact with normal finger-to-nose, rapid altering movements and heel-to-shin  Speech-no significant dysarthria or aphasia  Vision-grossly intact      LAB RESULTS  Recent Results (from the past 24 hour(s))   ECG 12 Lead Other; Dizziness    Collection Time: 09/03/24  8:09 PM   Result Value Ref Range    QT Interval 445 ms    QTC Interval 520 ms   Comprehensive Metabolic Panel    Collection Time: 09/03/24  8:10 PM    Specimen: Blood   Result Value Ref Range    Glucose 232 (H) 65 - 99 mg/dL    BUN 26 (H) 8 - 23 mg/dL    Creatinine 2.48 (H) 0.76 - 1.27 mg/dL    Sodium 138 136 - 145 mmol/L    Potassium 3.9 3.5 - 5.2 mmol/L    Chloride 97 (L) 98 - 107 mmol/L    CO2 29.3 (H) 22.0 - 29.0 mmol/L    Calcium 9.3 8.6 - 10.5 mg/dL    Total Protein 7.3 6.0 - 8.5 g/dL    Albumin 4.5 3.5 - 5.2 g/dL    ALT (SGPT) 15 1 - 41 U/L    AST (SGOT) 15 1 - 40 U/L    Alkaline Phosphatase 86 39 - 117 U/L    Total Bilirubin 0.2 0.0 - 1.2 mg/dL    Globulin 2.8 gm/dL    A/G Ratio 1.6 g/dL    BUN/Creatinine Ratio 10.5 7.0 - 25.0    Anion Gap 11.7 5.0 - 15.0 mmol/L    eGFR 27.4 (L) >60.0 mL/min/1.73   Single High Sensitivity Troponin T    Collection Time: 09/03/24  8:10 PM    Specimen: Blood   Result Value Ref Range    HS Troponin T 120 (C) <22 ng/L   CBC Auto Differential    Collection Time: 09/03/24  8:10 PM    Specimen: Blood   Result Value Ref Range    WBC  6.67 3.40 - 10.80 10*3/mm3    RBC 4.17 4.14 - 5.80 10*6/mm3    Hemoglobin 11.5 (L) 13.0 - 17.7 g/dL    Hematocrit 36.2 (L) 37.5 - 51.0 %    MCV 86.8 79.0 - 97.0 fL    MCH 27.6 26.6 - 33.0 pg    MCHC 31.8 31.5 - 35.7 g/dL    RDW 13.4 12.3 - 15.4 %    RDW-SD 41.5 37.0 - 54.0 fl    MPV 10.1 6.0 - 12.0 fL    Platelets 293 140 - 450 10*3/mm3    Neutrophil % 80.3 (H) 42.7 - 76.0 %    Lymphocyte % 11.2 (L) 19.6 - 45.3 %    Monocyte % 7.3 5.0 - 12.0 %    Eosinophil % 0.4 0.3 - 6.2 %    Basophil % 0.1 0.0 - 1.5 %    Immature Grans % 0.7 (H) 0.0 - 0.5 %    Neutrophils, Absolute 5.34 1.70 - 7.00 10*3/mm3    Lymphocytes, Absolute 0.75 0.70 - 3.10 10*3/mm3    Monocytes, Absolute 0.49 0.10 - 0.90 10*3/mm3    Eosinophils, Absolute 0.03 0.00 - 0.40 10*3/mm3    Basophils, Absolute 0.01 0.00 - 0.20 10*3/mm3    Immature Grans, Absolute 0.05 0.00 - 0.05 10*3/mm3    nRBC 0.0 0.0 - 0.2 /100 WBC   High Sensitivity Troponin T 2Hr    Collection Time: 09/03/24 10:14 PM    Specimen: Blood   Result Value Ref Range    HS Troponin T 123 (C) <22 ng/L    Troponin T Delta 3 >=-4 - <+4 ng/L         RADIOLOGY  No Radiology Exams Resulted Within Past 24 Hours      MEDICATIONS GIVEN IN ER  Medications   ondansetron (ZOFRAN) injection 4 mg (4 mg Intravenous Given 9/3/24 2041)         ORDERS PLACED DURING THIS VISIT:  Orders Placed This Encounter   Procedures   • Comprehensive Metabolic Panel   • Single High Sensitivity Troponin T   • CBC Auto Differential   • High Sensitivity Troponin T 2Hr   • [facility] (on-call MD unless specified) Details   • ECG 12 Lead Other; Dizziness   • CBC & Differential         OUTPATIENT MEDICATION MANAGEMENT:  No current Epic-ordered facility-administered medications on file.     Current Outpatient Medications Ordered in Epic   Medication Sig Dispense Refill   • apixaban (ELIQUIS) 2.5 MG tablet tablet Take 1 tablet by mouth Every 12 (Twelve) Hours. Indications: Atrial Fibrillation 60 tablet 11   • aspirin 81 MG EC tablet Take 1  tablet by mouth Daily.     • budesonide-formoterol (SYMBICORT) 160-4.5 MCG/ACT inhaler Inhale 2 puffs 2 (Two) Times a Day. 1 each 3   • buPROPion XL (WELLBUTRIN XL) 150 MG 24 hr tablet Take 1 tablet by mouth Daily.     • carvedilol (COREG) 3.125 MG tablet Take 1 tablet by mouth 2 (Two) Times a Day.     • famotidine (PEPCID) 10 MG tablet Take 1 tablet by mouth 2 (Two) Times a Day As Needed for Heartburn.     • ipratropium-albuterol (COMBIVENT RESPIMAT)  MCG/ACT inhaler Inhale 1 puff 4 (Four) Times a Day As Needed for Wheezing.     • levothyroxine (SYNTHROID, LEVOTHROID) 75 MCG tablet Take 1 tablet by mouth Daily. 30 tablet 5   • pantoprazole (PROTONIX) 40 MG EC tablet Take 1 tablet by mouth 2 (Two) Times a Day Before Meals.     • rosuvastatin (CRESTOR) 10 MG tablet Take 1 tablet by mouth Daily.     • tamsulosin (FLOMAX) 0.4 MG capsule 24 hr capsule Take 1 capsule by mouth Daily.     • vitamin D3 125 MCG (5000 UT) capsule capsule Take 1 capsule by mouth Daily.           PROCEDURES  Procedures            PROGRESS, DATA ANALYSIS, CONSULTS, AND MEDICAL DECISION MAKING  All labs have been independently interpreted by me.  All radiology studies have been reviewed by me. All EKG's have been independently viewed and interpreted by me.  Discussion below represents my analysis of pertinent findings related to patient's condition, differential diagnosis, treatment plan and final disposition.    Differential diagnosis includes but is not limited to symptomatic hypotension, arrhythmia, anemia, electrolyte disturbance.      ED Course as of 09/03/24 2346   Tue Sep 03, 2024   2022 EKG independently interpreted by me    Time 8:09 PM  Sinus 82 with occasional PAC  Normal P waves and MT intervals  QRS-left axis deviation, LVH, IVCd  ST, T waves-nonspecific changes  Not significant change when compared to 5/2024 [DB]   2034 Patient not tPA candidate with NIH of 0.    Will treat nausea with IV Zofran. [DB]   2211 Blood chemistries  reviewed are fairly benign for a known dialysis patient.  Potassium is normal at 3.9.  Bicarb 29.  Glucose 232. [DB]   2212 CBC does not show any infection or significant anemia with a hemoglobin 11.5. [DB]   2212 On repeat eval patient seems to be doing much better with fairly benign vitals.  Blood pressure heart rate and O2 sats unremarkable. [DB]   2301 I did attempt to get patient up and walk him but he is quite ataxic and unable to walk per usual.  He states he usually can walk with a cane but was unable to walk with a cane today. [DB]   2345 I discussed treatment and evaluation this patient with Lucian who will admit for [facility] on behalf of Dr. Taylor [DB]      ED Course User Index  [DB] Javad Meeks MD             AS OF 23:04 EDT VITALS:    BP - 152/72  HR - 77  TEMP - 96.4 °F (35.8 °C)  O2 SATS - 99%    COMPLEXITY OF CARE  69-year-old male with history of end-stage renal disease, diabetes, hypertension and hyperlipidemia and prior stroke presents with dizziness after dialysis today.  Patient was hypotensive upon EMS arrival.    Please see ED course above my independent evaluation for potation of ED testing including EKG, x-ray and labs.    NIH 0 and patient not a candidate for tPA.    As patient was unable to ambulate per usual we will plan admission for MRI, neurology consultation.      DIAGNOSIS  Final diagnoses:   Ataxia         DISPOSITION  ED Disposition       ED Disposition   Intended Admit    Condition   --    Comment   --                Please note that portions of this document were completed with a voice recognition program.    Note Disclaimer: At [facility], we believe that sharing information builds trust and better relationships. You are receiving this note because you recently visited [facility]. It is possible you will see health information before a provider has talked with you about it. This kind of information can be easy to misunderstand. To help you fully understand what it means  for your health, we urge you to discuss this note with your provider.         Javad Meeks MD  09/03/24 [id number]

## 2024-09-04 NOTE — THERAPY EVALUATION
Patient Name: Carlito Mo  : 1955    MRN: [id number]                              Today's Date: 2024       Admit Date: 9/3/2024    Visit Dx:     ICD-10-CM ICD-9-CM   1. Ataxia  R27.0 781.3     Patient Active Problem List   Diagnosis    Major depressive disorder with single episode, in partial remission    Other hyperlipidemia    Type 2 diabetes mellitus, with long-term current use of insulin    Vitamin D deficiency    Erectile dysfunction    Chronic fatigue    Type 2 diabetes mellitus with ophthalmic complication    Benign prostatic hyperplasia with nocturia    History of basal cell carcinoma    Acquired hypothyroidism    Recurrent strokes    Suspected sleep apnea    YAW (obstructive sleep apnea)    Coronary artery disease involving native coronary artery of native heart without angina pectoris    Chronically elevated troponin    Colon cancer screening    Enlarged lymph nodes    Functional gait abnormality    History of myocardial infarction    Insomnia    Iron deficiency anemia    Major depression, recurrent    Essential hypertension    Acute on chronic renal insufficiency    Falls frequently    Acute on chronic anemia    Neurogenic orthostatic hypotension    Anemia, chronic disease    History of colon polyps    Helicobacter pylori gastritis    Esophagitis    Embolic stroke    Patent foramen ovale    Cardiomyopathy    Diarrhea    Generalized weakness    Paroxysmal atrial fibrillation    Chronic anticoagulation    Acute ischemic stroke    History of stroke    Iron deficiency anemia    Acute HFrEF (heart failure with reduced ejection fraction)    ESRD (end stage renal disease)    Hemoptysis    Chronic HFrEF (heart failure with reduced ejection fraction)    Hemodialysis patient    Exertional dyspnea    Severe malnutrition    Ataxia     Past Medical History:   Diagnosis Date    Acquired hypothyroidism     Acute sinusitis     SYLVAIN (acute kidney injury)     on CKD    Anemia     Arthritis     Atrial  fibrillation     CAD (coronary artery disease)     Chronic combined systolic and diastolic congestive heart failure     COPD (chronic obstructive pulmonary disease)     Depression     Diabetic neuropathy 04/11/2022    Diabetes mellitus due to underlying condition    Esophagitis 06/10/2020    Added automatically from request for surgery [id number]      ESRD (end stage renal disease) 12/01/2023    Frequent PVCs     Generalized weakness     GERD (gastroesophageal reflux disease)     Helicobacter pylori gastritis 06/04/2020    Hyperlipidemia     Hypertension     Hypertensive encephalopathy     Pleural effusion     Pneumonia     Poor historian     RBBB (right bundle branch block)     Stroke     POOR VISION    TIA (transient ischemic attack)     Type 2 diabetes mellitus     Urinary retention     Vitamin D deficiency      Past Surgical History:   Procedure Laterality Date    APPENDECTOMY N/A 02/14/2016    Dr. Alexey Dodson    ARTERIOVENOUS FISTULA/SHUNT SURGERY Right 06/03/2022    Procedure: RIGHT FOREARM ARTERIOVENOUS FISTULA PLACEMENT RADIOCEPHALIC WITH CEPHALIC VEIN TRANSPOSITION;  Surgeon: Eliseo Willis MD;  Location: [facility] OR;  Service: Vascular;  Laterality: Right;    COLONOSCOPY      over 20 years ago.  no polyps     COLONOSCOPY N/A 09/18/2018    Procedure: COLONOSCOPY;  Surgeon: Jose Enrique Louie MD;  Location: [facility] ENDOSCOPY;  Service: Gastroenterology    COLONOSCOPY N/A 09/19/2018    IH, EH, polyps (TAs w/low grade dysplasia)    COLONOSCOPY N/A 06/01/2020    Procedure: COLONOSCOPY;  Surgeon: Jose Enrique Louie MD;  Location: [facility] ENDOSCOPY;  Service: Gastroenterology;  Laterality: N/A;  Pre op-Anemia, Melena, History of Polyps  Post op-To Cecum/TI, Polyp, Poor Prep    CORONARY ARTERY BYPASS GRAFT  11/2001    ENDOSCOPY N/A 05/29/2020    Procedure: ESOPHAGOGASTRODUODENOSCOPY with biopsies;  Surgeon: Amanda Lovelace MD;  Location: [facility] ENDOSCOPY;  Service: Gastroenterology;  Laterality: N/A;   pre-anemia, dark stools  post-esophagitis, hiatal hernia    ENDOSCOPY N/A 09/15/2020    Procedure: ESOPHAGOGASTRODUODENOSCOPY WITH BIOPSIES;  Surgeon: Jose Enrique Louie MD;  Location: [facility] ENDOSCOPY;  Service: Gastroenterology;  Laterality: N/A;  pre: history of melena and esophagitis  post: mild esophagitis and gastritis, small hiatal hernia    ENDOSCOPY N/A 12/4/2023    Procedure: ESOPHAGOGASTRODUODENOSCOPY with bx;  Surgeon: Quoc Elmore MD;  Location: [facility] ENDOSCOPY;  Service: Gastroenterology;  Laterality: N/A;  pre: Coffee-ground emesis  post: hiatal hernia, esophagitis    HERNIA REPAIR Left 12/05/2019    THORACENTESIS      TONSILLECTOMY      VASECTOMY        General Information       Row Name 09/04/24 1321          OT Time and Intention    Document Type evaluation  -     Mode of Treatment occupational therapy;individual therapy  -       Row Name 09/04/24 1321          General Information    Patient Profile Reviewed yes  -SM     Prior Level of Function independent:;ADL's;all household mobility  -     Existing Precautions/Restrictions fall  -       Row Name 09/04/24 1321          Occupational Profile    Environmental Supports and Barriers (Occupational Profile) home DME has a shower chair, grab bar  -       Row Name 09/04/24 1321          Living Environment    People in Home spouse  -       Row Name 09/04/24 1321          Cognition    Orientation Status (Cognition) oriented x 3  slow to respond  -       Row Name 09/04/24 1321          Safety Issues, Functional Mobility    Safety Issues Affecting Function (Mobility) safety precautions follow-through/compliance  -     Impairments Affecting Function (Mobility) endurance/activity tolerance;balance;strength  -               User Key  (r) = Recorded By, (t) = Taken By, (c) = Cosigned By      Initials Name Provider Type    SM Amanda Martinez OT Occupational Therapist                     Mobility/ADL's       Row Name 09/04/24 1326           Bed Mobility    Bed Mobility supine-sit  -     Supine-Sit [place] (Bed Mobility) minimum assist (75% patient effort)  -SM       Row Name 09/04/24 1323          Transfers    Transfers sit-stand transfer;toilet transfer  -SM       Row Name 09/04/24 1323          Sit-Stand Transfer    Sit-Stand [place] (Transfers) minimum assist (75% patient effort);verbal cues  -     Assistive Device (Sit-Stand Transfers) cane, straight  -SM       Row Name 09/04/24 1323          Toilet Transfer    [place] Level (Toilet Transfer) minimum assist (75% patient effort);verbal cues  -     Assistive Device (Toilet Transfer) cane, straight  -SM       Row Name 09/04/24 1323          Functional Mobility    Functional Mobility- Ind. Level minimum assist (75% patient effort);contact guard assist  -     Functional Mobility- Device cane, straight  [facility]     Functional Mobility- Comment to/from bathroom, cruising for furniture  -SM       Row Name 09/04/24 1323          Activities of Daily Living    BADL Assessment/Intervention lower body dressing;grooming;toileting  -SM       Row Name 09/04/24 1323          Lower Body Dressing Assessment/Training    [place] Level (Lower Body Dressing) don;socks;standby assist  -     Position (Lower Body Dressing) edge of bed sitting  -SM       Row Name 09/04/24 1323          Grooming Assessment/Training    [place] Level (Grooming) wash face, hands;contact guard assist  -     Position (Grooming) sink side;supported standing  -SM       Row Name 09/04/24 1323          Toileting Assessment/Training    [place] Level (Toileting) maximum assist (25% patient effort)  -               User Key  (r) = Recorded By, (t) = Taken By, (c) = Cosigned By      Initials Name Provider Type     Amanda Martinez OT Occupational Therapist                   Obj/Interventions       [facility] Name 09/04/24 1325          Range of Motion Comprehensive    Comment, General Range of Motion shoulder  flex 3/4 AROM  -       Row Name 09/04/24 1325          Strength Comprehensive (MMT)    Comment, General Manual Muscle Testing (MMT) Assessment shoulder flex 3-/5, distal UE 4/5  -       Row Name 09/04/24 1325          Balance    Balance Assessment sitting static balance;standing static balance;standing dynamic balance  -SM     Static Sitting Balance standby assist  -SM     Position, Sitting Balance sitting edge of bed  -SM     Static Standing Balance contact guard  -SM     Dynamic Standing Balance minimal assist  -SM     Position/Device Used, Standing Balance supported  -               User Key  (r) = Recorded By, (t) = Taken By, (c) = Cosigned By      Initials Name Provider Type    SM Amanda Martinez, OT Occupational Therapist                   Goals/Plan       [facility] Name 09/04/24 1328          Transfer Goal 1 (OT)    Activity/Assistive Device (Transfer Goal 1, OT) shower chair;toilet  -SM     [place] Level/Cues Needed (Transfer Goal 1, OT) standby assist  -SM     Time Frame (Transfer Goal 1, OT) short term goal (STG);2 weeks  -SM     Progress/Outcome (Transfer Goal 1, OT) goal ongoing  -[facility] Name 09/04/24 1328          Bathing Goal 1 (OT)    Activity/Device (Bathing Goal 1, OT) bathing skills, all  -SM     [place] Level/Cues Needed (Bathing Goal 1, OT) standby assist  -SM     Time Frame (Bathing Goal 1, OT) short term goal (STG);2 weeks  -SM     Progress/Outcomes (Bathing Goal 1, OT) goal ongoing  -SM       Row Name 09/04/24 1328          Dressing Goal 1 (OT)    Activity/Device (Dressing Goal 1, OT) dressing skills, all  -SM     [place]/Cues Needed (Dressing Goal 1, OT) standby assist  -SM     Time Frame (Dressing Goal 1, OT) short term goal (STG);2 weeks  -SM     Progress/Outcome (Dressing Goal 1, OT) goal ongoing  -[facility] Name 09/04/24 1328          Toileting Goal 1 (OT)    Activity/Device (Toileting Goal 1, OT) toileting skills, all  -SM     [place] Level/Cues Needed  (Toileting Goal 1, OT) standby assist  -SM     Time Frame (Toileting Goal 1, OT) short term goal (STG);2 weeks  -SM     Progress/Outcome (Toileting Goal 1, OT) goal ongoing  -       Row Name 09/04/24 1328          Grooming Goal 1 (OT)    Activity/Device (Grooming Goal 1, OT) grooming skills, all  -SM     [place] (Grooming Goal 1, OT) standby assist  -SM     Time Frame (Grooming Goal 1, OT) short term goal (STG);2 weeks  -SM     Strategies/Barriers (Grooming Goal 1, OT) standing at sink.  -       Row Name 09/04/24 1328          Therapy Assessment/Plan (OT)    Planned Therapy Interventions (OT) activity tolerance training;adaptive equipment training;BADL retraining;cognitive/visual perception retraining;functional balance retraining;IADL retraining;occupation/activity based interventions;orthotic fabrication/fitting/training;passive ROM/stretching;patient/caregiver education/training;ROM/therapeutic exercise;strengthening exercise;transfer/mobility retraining  -               User Key  (r) = Recorded By, (t) = Taken By, (c) = Cosigned By      Initials Name Provider Type     Amanda Martinez OT Occupational Therapist                   Clinical Impression       Row Name 09/04/24 1326          Pain Assessment    Pretreatment Pain Rating 0/10 - no pain  -     Posttreatment Pain Rating 0/10 - no pain  -       Row Name 09/04/24 1326          Plan of Care Review    Plan of Care Reviewed With patient  -SM     Outcome Evaluation Pt is a 69 y.o male admitted to [facility] with acute onset of dizziness, hypotensive. He has hx of end-stage renal disease on dialysis, hypertension, hyperlipidemia and diabetes. Pt uses a cane at baseline, he is very unsteady mobilizing to the bathroom today and often reaching for furniture, forward flexed. He needs assist with toileting in the bathroom due to balance, weakness. He reports dizziness without activity. He feels more off balance than usual. Recommend continued OT to address  ADL independence, safety. Pt may need SNF at [place].  -       Row Name 09/04/24 1326          Therapy Assessment/Plan (OT)    Rehab Potential (OT) good, to achieve stated therapy goals  -     Criteria for Skilled Therapeutic Interventions Met (OT) yes;skilled treatment is necessary  -     Therapy Frequency (OT) 5 times/wk  -       Row Name 09/04/24 1326          Therapy Plan Review/Discharge Plan (OT)    Anticipated Discharge Disposition (OT) skilled nursing facility  -       Row Name 09/04/24 1326          Vital Signs    Pre Systolic BP Rehab 133  -SM     Pre Treatment Diastolic BP 74  -SM     Post Systolic BP Rehab 142  -SM     Post Treatment Diastolic BP 71  -SM       [facility] Name 09/04/24 1326          Positioning and Restraints    Pre-Treatment Position in bed  -SM     Post Treatment Position chair  -SM     In Chair reclined;call light within reach;encouraged to call for assist;exit alarm on;notified [facility]  -               User Key  (r) = Recorded By, (t) = Taken By, (c) = Cosigned By      Initials Name Provider Type    Amanda Hodges, OT Occupational Therapist                   Outcome Measures       Row Name 09/04/24 1328          How much help from another is currently needed...    Putting on and taking off regular lower body clothing? 3  -SM     Bathing (including washing, rinsing, and drying) 2  -SM     Toileting (which includes using toilet bed pan or urinal) 2  -SM     Putting on and taking off regular upper body clothing 3  -SM     Taking care of personal grooming (such as brushing teeth) 3  -SM     Eating meals 4  -SM     AM-PAC 6 Clicks Score (OT) 17  -SM       Row Name 09/04/24 0800 09/04/24 0258       How much help from another person do you currently need...    Turning from your back to your side while in flat bed without using bedrails? 4  -MM 3  -EI    Moving from lying on back to sitting on the side of a flat bed without bedrails? 4  -MM 3  -EI    Moving to and from a bed to a chair  (including a wheelchair)? 3  -MM 3  -EI    Standing up from a chair using your arms (e.g., wheelchair, bedside chair)? 3  -MM 3  -EI    Climbing 3-5 steps with a railing? 2  -MM 3  -EI    To walk in hospital room? 3  -MM 3  -EI    AM-PAC 6 Clicks Score (PT) 19  -MM 18  -EI    Highest Level of Mobility Goal 6 --> Walk 10 steps or more  -MM 6 --> Walk 10 steps or more  -EI      Row Name 09/04/24 1328          Modified [place] Scale    Modified [place] Scale 4 - Moderately severe disability.  Unable to walk without assistance, and unable to attend to own bodily needs without assistance.  -       Row Name 09/04/24 1328          Functional Assessment    Outcome Measure Options AM-PAC 6 Clicks Daily Activity (OT);Modified [place]  -               User Key  (r) = Recorded By, (t) = Taken By, (c) = Cosigned By      Initials Name Provider Type    EI Nargis Alvarez, RN Registered Nurse    Arleen Rausch, RN Registered Nurse    Amanda Hodges, OT Occupational Therapist                    Occupational Therapy Education       Title: PT OT SLP Therapies (In Progress)       Topic: Occupational Therapy (In Progress)       Point: ADL training (Done)       Description:   Instruct learner(s) on proper safety adaptation and remediation techniques during self care or transfers.   Instruct in proper use of assistive devices.                  Learning Progress Summary             Patient Acceptance, E, VU by  at 9/4/2024 9690    Comment: OT goals/POC                         Point: Home exercise program (Not Started)       Description:   Instruct learner(s) on appropriate technique for monitoring, assisting and/or progressing therapeutic exercises/activities.                  Learner Progress:  Not documented in this visit.              Point: Precautions (Not Started)       Description:   Instruct learner(s) on prescribed precautions during self-care and functional transfers.                  Learner Progress:  Not  documented in this visit.              Point: Body mechanics (Not Started)       Description:   Instruct learner(s) on proper positioning and spine alignment during self-care, functional mobility activities and/or exercises.                  Learner Progress:  Not documented in this visit.                              User Key       Initials Effective Dates Name Provider Type Discipline     04/02/20 -  Amanda Martinez, OT Occupational Therapist OT                  OT Recommendation and Plan  Planned Therapy Interventions (OT): activity tolerance training, adaptive equipment training, BADL retraining, cognitive/visual perception retraining, functional balance retraining, IADL retraining, occupation/activity based interventions, orthotic fabrication/fitting/training, passive ROM/stretching, patient/caregiver education/training, ROM/therapeutic exercise, strengthening exercise, transfer/mobility retraining  Therapy Frequency (OT): 5 times/wk  Plan of Care Review  Plan of Care Reviewed With: patient  Outcome Evaluation: Pt is a 69 y.o male admitted to [facility] with acute onset of dizziness, hypotensive. He has hx of end-stage renal disease on dialysis, hypertension, hyperlipidemia and diabetes. Pt uses a cane at baseline, he is very unsteady mobilizing to the bathroom today and often reaching for furniture, forward flexed. He needs assist with toileting in the bathroom due to balance, weakness. He reports dizziness without activity. He feels more off balance than usual. Recommend continued OT to address ADL independence, safety. Pt may need SNF at [place].     Time Calculation:   Evaluation Complexity (OT)  Review Occupational Profile/Medical/Therapy History Complexity: expanded/moderate complexity  Assessment, Occupational Performance/Identification of Deficit Complexity: 3-5 performance deficits  Clinical Decision Making Complexity (OT): detailed assessment/moderate complexity  Overall Complexity of Evaluation (OT):  moderate complexity     Time Calculation- OT       Row Name 09/04/24 1329             Time Calculation- OT    OT Start Time 0843  -      OT Stop Time 0904  -      OT Time Calculation (min) 21 min  -SM      Total Timed Code Minutes- OT 15 minute(s)  -      OT Received On 09/04/24  -      OT - Next Appointment 09/05/24  -      OT Goal Re-Cert Due Date 09/18/24  -         Timed Charges    92873 - OT Self Care/Mgmt Minutes 15  -SM         Total Minutes    Timed Charges Total Minutes 15  -SM       Total Minutes 15  -SM                User Key  (r) = Recorded By, (t) = Taken By, (c) = Cosigned By      Initials Name Provider Type     Amanda Martinez OT Occupational Therapist                  Therapy Charges for Today       Code Description Service Date Service Provider Modifiers Qty    [id number] HC OT SELF CARE/MGMT/TRAIN EA 15 MIN 9/4/2024 Amanda Martinez OT GO 1    [id number]  OT EVAL MOD COMPLEXITY 2 9/4/2024 Amanda Martinez OT GO 1                 Amanda Martinez OT  9/4/2024

## 2024-09-04 NOTE — PLAN OF CARE
Goal Outcome Evaluation:  Plan of Care Reviewed With: patient           Outcome Evaluation: Pt is a 69 y.o male admitted to [facility] with acute onset of dizziness, hypotensive. He has hx of end-stage renal disease on dialysis, hypertension, hyperlipidemia and diabetes. Pt uses a cane at baseline, he is very unsteady mobilizing to the bathroom today and often reaching for furniture, forward flexed. He needs assist with toileting in the bathroom due to balance, weakness. He reports dizziness without activity. He feels more off balance than usual. Recommend continued OT to address ADL independence, safety. Pt may need SNF at [place].      Anticipated Discharge Disposition (OT): skilled nursing facility

## 2024-09-04 NOTE — CONSULTS
Neurology Consult Note    Consult Date: 9/4/2024    Referring MD: Mirza Taylor DO    Reason for Consult I have been asked to see the patient in neurological consultation to render advice and opinion regarding dizziness, N/V, ataxia.     Carlito Mo is a 69 y.o. male with history of ESRD on dialysis, HTN, HLD, diabetes, multiple strokes, paroxysmal atrial fibrillation Eliquis 5 mg BID, small PFO, neurogenic orthostatic hypotension presented to the ED yesterday with dizziness and N/V. Patient had just gotten home from dialysis and was walking to his bed room when he suddenly developed lightheadedness like he was going to pass out, N/V, and SOA so he sat down on the stairs. He denied chest pain/pressure, palpitations, abdominal pain, double vision, blurry vision, speech deficit, weakness or numbness. Patient said this spell only lasted a few minutes. However when he got up to try to walk and could not which he normally can with cane. He said this persisted yesterday and has improved some today. He denied headache. BP on arrival was 146/83 mmHg and pulse was 71 bpm. Temperature was 96.4 degrees F, RR 16, and O2 was 100%. Patient admit to only taking Eliquis every other day maybe and not taking ASA and statin regularly.     Patient has been followed by neurology for multiple recurrent strokes with most recent being on September 16, 2022 for increasing generalized weakness for 3-4 days with 2 falls. CTH done at the time showed no acute finding with old right cerebellar infarct and bilateral thalamic infarcts. MRI brain demonstrated acute right temporal lobe infarct in right MCA territory and left parieto-occipital infarct. There was also chronic right corona radiata lacune, and extensive small vessel disease. MRA head unremarkable. Carotid ultrasound mild stenosis in bilateral ICAs.  2D ECHO shows EF 56%, LAE moderately, saline test negative. Patient was discharged home on Eliquis 5 mg bid.     Discussed with Lissette Mo,  patient wife, who is unaware patient was not taking medication. She states he needs help with bathing at baseline. She takes care of cooking and bills. She is interested in home health aide to help patient and would like to discuss with case management. She thinks he is slowing down and memory similar to when Kae saw them in 2023.    Past Medical/Surgical Hx:  Past Medical History:   Diagnosis Date    Acquired hypothyroidism     Acute sinusitis     SYLVAIN (acute kidney injury)     on CKD    Anemia     Arthritis     Atrial fibrillation     CAD (coronary artery disease)     Chronic combined systolic and diastolic congestive heart failure     COPD (chronic obstructive pulmonary disease)     Depression     Diabetic neuropathy 04/11/2022    Diabetes mellitus due to underlying condition    Esophagitis 06/10/2020    Added automatically from request for surgery [id number]      ESRD (end stage renal disease) 12/01/2023    Frequent PVCs     Generalized weakness     GERD (gastroesophageal reflux disease)     Helicobacter pylori gastritis 06/04/2020    Hyperlipidemia     Hypertension     Hypertensive encephalopathy     Pleural effusion     Pneumonia     Poor historian     RBBB (right bundle branch block)     Stroke     POOR VISION    TIA (transient ischemic attack)     Type 2 diabetes mellitus     Urinary retention     Vitamin D deficiency      Past Surgical History:   Procedure Laterality Date    APPENDECTOMY N/A 02/14/2016    Dr. Alexey Dodson    ARTERIOVENOUS FISTULA/SHUNT SURGERY Right 06/03/2022    Procedure: RIGHT FOREARM ARTERIOVENOUS FISTULA PLACEMENT RADIOCEPHALIC WITH CEPHALIC VEIN TRANSPOSITION;  Surgeon: Eliseo Willis MD;  Location: [facility] OR;  Service: Vascular;  Laterality: Right;    COLONOSCOPY      over 20 years ago.  no polyps     COLONOSCOPY N/A 09/18/2018    Procedure: COLONOSCOPY;  Surgeon: Jose Enrique Louie MD;  Location: [facility] ENDOSCOPY;  Service: Gastroenterology    COLONOSCOPY N/A  09/19/2018    IH, EH, polyps (TAs w/low grade dysplasia)    COLONOSCOPY N/A 06/01/2020    Procedure: COLONOSCOPY;  Surgeon: Jose Enrique Louie MD;  Location:  [facility];  Service: Gastroenterology;  Laterality: N/A;  Pre op-Anemia, Melena, History of Polyps  Post op-To Cecum/TI, Polyp, Poor Prep    CORONARY ARTERY BYPASS GRAFT  11/2001    ENDOSCOPY N/A 05/29/2020    Procedure: ESOPHAGOGASTRODUODENOSCOPY with biopsies;  Surgeon: Amanda Lovelace MD;  Location: [facility] ENDOSCOPY;  Service: Gastroenterology;  Laterality: N/A;  pre-anemia, dark stools  post-esophagitis, hiatal hernia    ENDOSCOPY N/A 09/15/2020    Procedure: ESOPHAGOGASTRODUODENOSCOPY WITH BIOPSIES;  Surgeon: Jose Enrique Louie MD;  Location: [facility] ENDOSCOPY;  Service: Gastroenterology;  Laterality: N/A;  pre: history of melena and esophagitis  post: mild esophagitis and gastritis, small hiatal hernia    ENDOSCOPY N/A 12/4/2023    Procedure: ESOPHAGOGASTRODUODENOSCOPY with bx;  Surgeon: Quoc Elmore MD;  Location: [facility] ENDOSCOPY;  Service: Gastroenterology;  Laterality: N/A;  pre: Coffee-ground emesis  post: hiatal hernia, esophagitis    HERNIA REPAIR Left 12/05/2019    THORACENTESIS      TONSILLECTOMY      VASECTOMY         Medications On Admission  Medications Prior to Admission   Medication Sig Dispense Refill Last Dose    apixaban (ELIQUIS) 2.5 MG tablet tablet Take 1 tablet by mouth Every 12 (Twelve) Hours. Indications: Atrial Fibrillation 60 tablet 11 9/3/2024    buPROPion XL (WELLBUTRIN XL) 150 MG 24 hr tablet Take 1 tablet by mouth Daily.   9/3/2024    carvedilol (COREG) 3.125 MG tablet Take 1 tablet by mouth 2 (Two) Times a Day.   9/3/2024    famotidine (PEPCID) 10 MG tablet Take 1 tablet by mouth 2 (Two) Times a Day As Needed for Heartburn.   Past Week    ipratropium-albuterol (COMBIVENT RESPIMAT)  MCG/ACT inhaler Inhale 1 puff 4 (Four) Times a Day As Needed for Wheezing.   Past Week    levothyroxine (SYNTHROID,  "LEVOTHROID) 75 MCG tablet Take 1 tablet by mouth Daily. 30 tablet 5 9/3/2024    rosuvastatin (CRESTOR) 10 MG tablet Take 1 tablet by mouth Daily.   9/3/2024    tamsulosin (FLOMAX) 0.4 MG capsule 24 hr capsule Take 1 capsule by mouth Daily.   9/3/2024    aspirin 81 MG EC tablet Take 1 tablet by mouth Daily.       budesonide-formoterol (SYMBICORT) 160-4.5 MCG/ACT inhaler Inhale 2 puffs 2 (Two) Times a Day. 1 each 3     pantoprazole (PROTONIX) 40 MG EC tablet Take 1 tablet by mouth 2 (Two) Times a Day Before Meals.       vitamin D3 125 MCG (5000 UT) capsule capsule Take 1 capsule by mouth Daily.          Allergies:  Allergies   Allergen Reactions    Prozac [Fluoxetine Hcl] Other (See Comments)     shaking       Social Hx:  Social History     Socioeconomic History    Marital status:      Spouse name: Lissette    Number of children: 3    Years of education: college   Tobacco Use    Smoking status: Never    Smokeless tobacco: Never    Tobacco comments:     CAFFEINE - OCCAS. SODA   Vaping Use    Vaping status: Never Used   Substance and Sexual Activity    Alcohol use: No     Comment: last drink 2 months ago    Drug use: No    Sexual activity: Defer       Family Hx:  Family History   Problem Relation Age of Onset    Diabetes Mother     Hypertension Mother     Macular degeneration Mother     Alcohol abuse Father     Cancer Father         lung,  age 65    Heart disease Father     Alcohol abuse Brother     Cirrhosis Brother          age 50    Liver cancer Brother 45    Diabetes Son     Kidney failure Son     Autism Son     Malig Hyperthermia Neg Hx        Exam    /74   Pulse 65   Temp 97.5 °F (36.4 °C) (Oral)   Resp 18   Ht 182.9 cm (72\")   Wt 58.1 kg (128 lb)   SpO2 99%   BMI 17.36 kg/m²   gen: NAD, vitals reviewed  MS: depressed appearing oriented x3, recent/remote memory intact, normal attention/concentration, language intact, no neglect  CN: visual acuity grossly normal, visual fields full, " PERRL, EOMI, mild anisicoria, facial sensation equal, no facial droop, hearing symmetric, palate elevates symmetrically, shoulder shrug equal, tongue midline  Motor: 5/5 throughout upper and lower extremities, normal tone  Sensation: intact to temperature throughout, decrease to vibration in bilateral lower extremities  Coordination: no dysmetria with finger to nose bilaterally  Finger to thumb tap: slowed in left  Walk: mild ataxia, requiring 2 assists    DATA:    Lab Results   Component Value Date    GLUCOSE 185 (H) 09/04/2024    CALCIUM 9.1 09/04/2024     09/04/2024    K 4.0 09/04/2024    CO2 28.0 09/04/2024    CL 97 (L) 09/04/2024    BUN 38 (H) 09/04/2024    CREATININE 3.13 (H) 09/04/2024    EGFRIFAFRI 23 (L) 09/27/2022    EGFRIFNONA 19 (L) 02/04/2022    BCR 12.1 09/04/2024    ANIONGAP 12.0 09/04/2024     Lab Results   Component Value Date    WBC 5.96 09/04/2024    HGB 10.3 (L) 09/04/2024    HCT 31.4 (L) 09/04/2024    MCV 85.6 09/04/2024     09/04/2024     Lab Results   Component Value Date     (H) 09/04/2024     (H) 09/17/2022     (H) 06/05/2022     Lab Results   Component Value Date    HGBA1C 7.40 (H) 09/04/2024     Lab Results   Component Value Date    INR 1.19 (H) 05/23/2024    INR 1.54 (H) 01/04/2024    INR 1.54 (H) 01/03/2024    PROTIME 15.4 (H) 05/23/2024    PROTIME 18.8 (H) 01/04/2024    PROTIME 18.7 (H) 01/03/2024       Lab review:   Glucose 185  BUN 38  Creatine 3.13  eGFR: 19  HgB A1c: 7.40      Imaging review:   CTH without:  FINDINGS: There are extensive patchy and confluent areas of low-density  throughout the cerebral white matter consistent with severe small vessel  disease. There is a chronic area of encephalomalacia in the superior  left parietal cortex measuring 2.5 x 1.8 cm in size compatible with an  old superior left parietal cortical infarct, unchanged dating back to  MRI of the brain 09/17/2022. It is in the left MCA territory.  Furthermore there  is a 2.7 x 1.3 cm chronic infarct in the superomedial  right cerebellum in the right superior cerebellar artery territory and  there is a 18 x 5 mm chronic infarct in the posterolateral left  cerebellum in the left PICA territory, unchanged since MRI of the brain  09/17/2022. There are tiny old lacunar infarcts in the thalami  bilaterally unchanged. There is diffuse cerebral atrophy. The ventricles  are normal in size. I see no mass effect and no midline shift and no  extra-axial fluid collections are identified and there is no evidence of  acute intracranial hemorrhage. There is a 2.5 cm mucous retention cyst  in the posterior right maxillary sinus. The remainder of the paranasal  sinuses, mastoid air cells and middle ear cavities are clear. The  calvarium and skull base are normal in appearance. There is a chronic  sebaceous cyst in the posterior superior left parietal scalp measuring  18 x 14 mm in size.  IMPRESSION:  1. No convincing acute intracranial abnormality is identified with no  discernible change when compared to prior MRI of the brain on  09/17/2022.  2. There is severe small vessel disease in the cerebral white matter.  There is a 2.5 x 1.8 cm old superior left parietal infarct in the left  MCA territory. There is a 2.7 x 1.3 cm old superomedial right cerebellar  infarct in the right superior cerebellar artery territory and there is  an 18 x 5 mm old posterolateral left cerebellar infarct in the left PICA  territory and there are small old lacunar infarcts in the thalami  bilaterally. There is diffuse cerebral atrophy.  3. There is a 2.5 cm mucous retention cyst in the posterior right  maxillary sinus and there is a sebaceous cyst in the posterior superior  left parietal scalp measuring 18 x 14 x 19 mm in size. The remainder of  the head CT is normal. The etiology of the ataxia and dizziness is not  established on this exam and if there remains any clinical suspicion of  an acute infarct, I recommend  an MRI of the brain for more complete  assessment. The findings and recommendations were communicated to  Eliseo Johnson MD, by telephone on 09/04/2024 at 8:20 a.m.    TTE:    Left ventricular systolic function is mildly decreased. Calculated left ventricular EF = 44.7%. The following left ventricular wall segments are hypokinetic: apical inferior, mid inferior and apical septal.    Left ventricular diastolic function is consistent with (grade Ia w/high LAP) impaired relaxation.    There is a focal area of thickening and calcification on the anterior mitral valve without any significant stenosis or regurgitation.    Saline test results are negative for right to left atrial level shunt.    MRI brain without contrast:  FINDINGS: There is extensive patchy and confluent T2 high signal  throughout the periventricular and subcortical white matter of the  cerebral hemispheres compatible with severe small vessel disease. There  are tiny old lacunar infarcts in the mid right corona radiata region and  in the thalami bilaterally. There is a 2.5 cm old superior parietal  infarct in the left MCA territory. There is a 2.4 x 1.1 cm old superior  medial right cerebellar infarct and an additional 1.8 x 0.5 cm old  superior right vermian cerebellar infarct in the right superior  cerebellar artery territory. There is a 7 x 2 mm old posterior inferior  lateral right cerebellar infarct in the right PICA territory and there  is a 14 x 4 mm old posterior inferior lateral left cerebellar infarct in  the left PICA territory, and these are all unchanged since prior MRI  09/17/2022. There is an ovoid area of intense increased signal intensity  on the diffusion weighted images projecting over the mid left cerebellar  white matter that measures 5 mm in size. It is a little dark on the ADC  maps and bright on the FLAIR images and is compatible with a tiny acute  mid left cerebellar white matter infarct. There is mild diffuse  cerebral  atrophy.  The lateral and third ventricles are mildly prominent in size,  felt to be on the basis of central volume loss or atrophy. I see no mass  effect and no midline shift. No extra-axial fluid collections are  identified. Calvarium and skull base are normal in appearance. There is  a 2.5 cm mucous retention cyst in the posterior right maxillary sinus.  Otherwise, the paranasal sinuses and the mastoid air cells and the  middle ear cavities are clear. There are bilateral intraocular lens  implants in the globes from previous cataract surgery, otherwise the  orbits are normal in appearance. There is a tiny punctate 2 mm focus of  signal loss on the gradient echo T2 weighted images in the right lateral  malcolm, likely a punctate area of hemosiderin deposition due to an old  hypertensive microhemorrhage and there is a 4 mm rounded focus of signal  loss in the inferior medial left cerebellum on the gradient echo T2  weighted images compatible with an additional focus of hemosiderin  deposition probably secondary to old hypertensive microhemorrhages.   There is a cluster of nodular foci of signal loss in the right and left  thalami compatible with multiple punctate old microhemorrhages in the  thalami bilaterally likely secondary to hypertension.  There is a 5 mm  rounded focus of signal loss in the superior lateral right occipital  juxtacortical white matter compatible with a tiny old microhemorrhage at  this site and given this location it could be secondary to amyloid.      IMPRESSION:  1. Since the prior MRI of the brain on 09/17/2022 there has been  development of a 5 mm rounded diffusion hyperintense focus in the mid  left cerebellum.  This is most compatible with a tiny acute mid left  cerebellar white matter infarct. Otherwise, there has been no  significant change.  2. There is severe small vessel disease in the cerebral white matter.  There are old lacunar infarcts in the mid right corona radiata  region  and the thalami bilaterally. There are multiple old bilateral cerebellar  infarcts including a 2.4 x 1.1 cm old superior medial right cerebellar  infarct and an additional 1.8 x 0.5 cm old superior right cerebellar  vermian infarct and these are both in the right superior cerebellar  artery territory. There is an additional 7 x 2 mm old posterior inferior  lateral right cerebellar infarct in the right PICA territory and there  is a 14 x 4 mm old posterior inferior lateral left cerebellar infarct in  the left PICA territory.  3. There are multiple punctate foci of hemosiderin deposition from tiny  old microhemorrhages with 4 in the left thalamus and 4 in the right  thalamus, and a 2 mm focus in the right side of the maloclm and these are  good locations for old hypertensive microhemorrhages. There is a 4 mm  focus in the inferior medial left cerebellum and a 4-5 mm rounded focus  in the superior lateral right occipital juxtacortical white matter that  could be secondary to amyloid. There is diffuse cerebral atrophy. The  remainder of the MRI of the brain is normal. I communicated the results  to Eliseo Card MD, by telephone on 09/04/2024 at 11:50 a.m.    Diagnoses:  Acute ischemic left cerebellum infarct, likely from atrial fibrillation and non-compliance with eliquis  History of multiple recurrent strokes  Atrial fibrillation with Eliquis 5 mg BID  Small PFO  Multiple bilateral chronic micro hemorrhages concerning for amyloid  HTN  HLD  Type 2 Diabetes  Multiple recurrent falls  Neurogenic orthostatic hypotension   Medication non-compliance     PLAN:   -CTA head and neck without contrast then dialysis following  -Pt was not a candidate for tnk given due to being on chronic anticoagulation   -Admit to 5th floor  -Give aspirin now; if cannot swallow give HI  -Eliquis 2.5 mg BID  -statin   -Maintain permissive HTN for 24-48hrs, do not treat unless BP > 220/120 if no contraindication  -Perform bedside  swallow test  -Telemetry to monitor for arrhythmia  -VTE prophylaxis  -Neuro checks, if change in exam call neuro oncall  -PT/OT below physical baseline  -case management consult to discuss home health options    Recommendations discussed with Dr. Hsieh and he is in agreement with plan.

## 2024-09-04 NOTE — THERAPY EVALUATION
Patient Name: Carlito Mo  : 1955    MRN: [id number]                              Today's Date: 2024       Admit Date: 9/3/2024    Visit Dx:     ICD-10-CM ICD-9-CM   1. Ataxia  R27.0 781.3     Patient Active Problem List   Diagnosis    Major depressive disorder with single episode, in partial remission    Other hyperlipidemia    Type 2 diabetes mellitus, with long-term current use of insulin    Vitamin D deficiency    Erectile dysfunction    Chronic fatigue    Type 2 diabetes mellitus with ophthalmic complication    Benign prostatic hyperplasia with nocturia    History of basal cell carcinoma    Acquired hypothyroidism    Recurrent strokes    Suspected sleep apnea    YAW (obstructive sleep apnea)    Coronary artery disease involving native coronary artery of native heart without angina pectoris    Chronically elevated troponin    Colon cancer screening    Enlarged lymph nodes    Functional gait abnormality    History of myocardial infarction    Insomnia    Iron deficiency anemia    Major depression, recurrent    Essential hypertension    Acute on chronic renal insufficiency    Falls frequently    Acute on chronic anemia    Neurogenic orthostatic hypotension    Anemia, chronic disease    History of colon polyps    Helicobacter pylori gastritis    Esophagitis    Embolic stroke    Patent foramen ovale    Cardiomyopathy    Diarrhea    Generalized weakness    Paroxysmal atrial fibrillation    Chronic anticoagulation    Acute ischemic stroke    History of stroke    Iron deficiency anemia    Acute HFrEF (heart failure with reduced ejection fraction)    ESRD (end stage renal disease)    Hemoptysis    Chronic HFrEF (heart failure with reduced ejection fraction)    Hemodialysis patient    Exertional dyspnea    Severe malnutrition    Ataxia     Past Medical History:   Diagnosis Date    Acquired hypothyroidism     Acute sinusitis     SYLVAIN (acute kidney injury)     on CKD    Anemia     Arthritis     Atrial  fibrillation     CAD (coronary artery disease)     Chronic combined systolic and diastolic congestive heart failure     COPD (chronic obstructive pulmonary disease)     Depression     Diabetic neuropathy 04/11/2022    Diabetes mellitus due to underlying condition    Esophagitis 06/10/2020    Added automatically from request for surgery [id number]      ESRD (end stage renal disease) 12/01/2023    Frequent PVCs     Generalized weakness     GERD (gastroesophageal reflux disease)     Helicobacter pylori gastritis 06/04/2020    Hyperlipidemia     Hypertension     Hypertensive encephalopathy     Pleural effusion     Pneumonia     Poor historian     RBBB (right bundle branch block)     Stroke     POOR VISION    TIA (transient ischemic attack)     Type 2 diabetes mellitus     Urinary retention     Vitamin D deficiency      Past Surgical History:   Procedure Laterality Date    APPENDECTOMY N/A 02/14/2016    Dr. Alexey Dodson    ARTERIOVENOUS FISTULA/SHUNT SURGERY Right 06/03/2022    Procedure: RIGHT FOREARM ARTERIOVENOUS FISTULA PLACEMENT RADIOCEPHALIC WITH CEPHALIC VEIN TRANSPOSITION;  Surgeon: Eliseo Willis MD;  Location: [facility] OR;  Service: Vascular;  Laterality: Right;    COLONOSCOPY      over 20 years ago.  no polyps     COLONOSCOPY N/A 09/18/2018    Procedure: COLONOSCOPY;  Surgeon: Jose Enrique Louie MD;  Location: [facility] ENDOSCOPY;  Service: Gastroenterology    COLONOSCOPY N/A 09/19/2018    IH, EH, polyps (TAs w/low grade dysplasia)    COLONOSCOPY N/A 06/01/2020    Procedure: COLONOSCOPY;  Surgeon: Jose Enrique Louie MD;  Location: [facility] ENDOSCOPY;  Service: Gastroenterology;  Laterality: N/A;  Pre op-Anemia, Melena, History of Polyps  Post op-To Cecum/TI, Polyp, Poor Prep    CORONARY ARTERY BYPASS GRAFT  11/2001    ENDOSCOPY N/A 05/29/2020    Procedure: ESOPHAGOGASTRODUODENOSCOPY with biopsies;  Surgeon: Amanda Lovelace MD;  Location: [facility] ENDOSCOPY;  Service: Gastroenterology;  Laterality: N/A;   pre-anemia, dark stools  post-esophagitis, hiatal hernia    ENDOSCOPY N/A 09/15/2020    Procedure: ESOPHAGOGASTRODUODENOSCOPY WITH BIOPSIES;  Surgeon: Jose Enrique Louie MD;  Location: [facility] ENDOSCOPY;  Service: Gastroenterology;  Laterality: N/A;  pre: history of melena and esophagitis  post: mild esophagitis and gastritis, small hiatal hernia    ENDOSCOPY N/A 12/4/2023    Procedure: ESOPHAGOGASTRODUODENOSCOPY with bx;  Surgeon: Quoc Elmore MD;  Location: [facility] ENDOSCOPY;  Service: Gastroenterology;  Laterality: N/A;  pre: Coffee-ground emesis  post: hiatal hernia, esophagitis    HERNIA REPAIR Left 12/05/2019    THORACENTESIS      TONSILLECTOMY      VASECTOMY        General Information       Row Name 09/04/24 [employer]          Physical Therapy Time and Intention    Document Type evaluation (P)   -SF     Mode of Treatment physical therapy;individual therapy  -CW       Row Name 09/04/24 1406          General Information    Patient Profile Reviewed yes (P)   -SF     Prior Level of Function independent:;ADL's;all household mobility (P)   -SF     Existing Precautions/Restrictions fall (P)   -SF       Row Name 09/04/24 1406          Living Environment    People in Home child(carlos), adult;spouse (P)   -SF       Row Name 09/04/24 1406          Home Main Entrance    Number of Stairs, Main Entrance none (P)   -SF       [facility] Name 09/04/24 1406          Stairs Within Home, Primary    Stairs, Within Home, Primary pt has no steps to enter side door of house, lives in trilevel with several steps inside (P)   -SF       Row Name 09/04/24 1406          Cognition    Orientation Status (Cognition) oriented x 3 (P)   -SF       [facility] Name 09/04/24 1406          Safety Issues, Functional Mobility    Safety Issues Affecting Function (Mobility) insight into deficits/self-awareness  -CW     Impairments Affecting Function (Mobility) balance;endurance/activity tolerance;strength (P)   -SF               User Key  (r) = Recorded By, (t) =  Taken By, (c) = Cosigned By      Initials Name Provider Type    Carlotta Perdomo, PT Physical Therapist    Heike Cleaning, PT Student PT Student                   Mobility       Row Name 09/04/24 1408          Bed Mobility    Bed Mobility supine-sit (P)   -SF     Supine-Sit [place] (Bed Mobility) standby assist (P)   -SF     Assistive Device (Bed Mobility) head of bed elevated;bed rails (P)   -SF       Row Name 09/04/24 1408          Sit-Stand Transfer    Sit-Stand [place] (Transfers) minimum assist (75% patient effort);verbal cues  -     Assistive Device (Sit-Stand Transfers) walker, front-wheeled (P)   -SF       Row Name 09/04/24 1408          Gait/Stairs (Locomotion)    [place] Level (Gait) minimum assist (75% patient effort);verbal cues  -     Assistive Device (Gait) walker, front-wheeled (P)   -SF     Distance in Feet (Gait) 40 (P)   -SF     Deviations/Abnormal Patterns (Gait) eli decreased;gait speed decreased;stride length decreased (P)   -SF     Bilateral Gait Deviations heel strike decreased (P)   -SF     Comment, (Gait/Stairs) very slow pace, unsteady but no loss of balance (P)   -SF               User Key  (r) = Recorded By, (t) = Taken By, (c) = Cosigned By      Initials Name Provider Type    Carlotta Perdomo, PT Physical Therapist    Heike Cleaning, PT Student PT Student                   Obj/Interventions       Row Name 09/04/24 1410          Range of Motion Comprehensive    General Range of Motion bilateral lower extremity ROM WFL (P)   -SF       Row Name 09/04/24 1410          Strength Comprehensive (MMT)    General Manual Muscle Testing (MMT) Assessment lower extremity strength deficits identified (P)   -SF       [facility] Name 09/04/24 1410          Balance    Balance Assessment sitting static balance;sitting dynamic balance;standing static balance;standing dynamic balance (P)   -SF     Static Sitting Balance standby assist (P)   -SF     Dynamic Sitting Balance  standby assist (P)   -SF     Position, Sitting Balance sitting edge of bed (P)   -SF     Static Standing Balance minimal assist;1-person assist (P)   -SF     Dynamic Standing Balance minimal assist;1-person assist  -CW     Position/Device Used, Standing Balance walker, front-wheeled (P)   -SF     Comment, Balance unsteadiness from pt reported dizziness (P)   -SF               User Key  (r) = Recorded By, (t) = Taken By, (c) = Cosigned By      Initials Name Provider Type    CW Carlotta Go, PT Physical Therapist    Heike Cleaning, PT Student PT Student                   Goals/Plan       Row Name 09/04/24 1421          Bed Mobility Goal 1 (PT)    Activity/Assistive Device (Bed Mobility Goal 1, PT) bed mobility activities, all (P)   -SF     [place] Level/Cues Needed (Bed Mobility Goal 1, PT) independent (P)   -SF     Time Frame (Bed Mobility Goal 1, PT) 2 weeks (P)   -SF       Row Name 09/04/24 1421          Transfer Goal 1 (PT)    Activity/Assistive Device (Transfer Goal 1, PT) transfers, all (P)   -SF     [place] Level/Cues Needed (Transfer Goal 1, PT) supervision required (P)   -SF     Time Frame (Transfer Goal 1, PT) 2 weeks (P)   -SF       Row Name 09/04/24 1421          Gait Training Goal 1 (PT)    Activity/Assistive Device (Gait Training Goal 1, PT) gait (walking locomotion);walker, rolling (P)   -SF     [place] Level (Gait Training Goal 1, PT) supervision required (P)   -SF     Distance (Gait Training Goal 1, PT) 100 (P)   -SF     Time Frame (Gait Training Goal 1, PT) 2 weeks (P)   -SF       Row Name 09/04/24 1421          Therapy Assessment/Plan (PT)    Planned Therapy Interventions (PT) balance training;bed mobility training;gait training;home exercise program;patient/family education;ROM (range of motion);stair training;strengthening;stretching;transfer training (P)   -SF               User Key  (r) = Recorded By, (t) = Taken By, (c) = Cosigned By      Initials Name Provider Type     Heike Cleaning, PT Student PT Student                   Clinical Impression       Row Name 09/04/24 1411          Pain    Pretreatment Pain Rating 0/10 - no pain (P)   -       Row Name 09/04/24 1411          Plan of Care Review    Plan of Care Reviewed With patient (P)   -SF     Outcome Evaluation Pt is a 69 year old male admitted with acute dizziness and hypotension. He lives at home with his wife and children and was independent in ADLs prior to admission. He reports no steps to enter the side door of his house, but he lives in a trilevel home with several stairs inside. He uses a cane for AMB at baseline. He reported multiple falls in the last 6 months. Upon examination, pt demonstrated decreased balance, strength, and activity tolerance. He sat up to EOB with SBA and stood with min A x 1 and rolling walker. He mentioned increased dizziness when moving from sitting to standing. He AMB 40 ft with min A x 1 and rolling walker. He demonstrated slow pace and unsteadiness but did not have any loss of balance. Following AMB he was assisted to his recliner and left with SLP. Pt would benefit from skilled PT to address stated deficits. PT recommends SNF at d/c. (P)   -SF       Row Name 09/04/24 1411          Therapy Assessment/Plan (PT)    Rehab Potential (PT) good, to achieve stated therapy goals (P)   -     Criteria for Skilled Interventions Met (PT) yes (P)   -SF     Therapy Frequency (PT) 5 times/wk (P)   -SF       Row Name 09/04/24 1411          Vital Signs    O2 Delivery Pre Treatment room air (P)   -       Row Name 09/04/24 1411          Positioning and Restraints    Pre-Treatment Position in bed (P)   -SF     Post Treatment Position chair (P)   -SF     In Chair reclined;call light within reach;exit alarm on;with SLP (P)   -SF               User Key  (r) = Recorded By, (t) = Taken By, (c) = Cosigned By      Initials Name Provider Type    Heike Cleaning, PT Student PT Student                   Outcome  Measures       Row Name 09/04/24 1423 09/04/24 0800       How much help from another person do you currently need...    Turning from your back to your side while in flat bed without using bedrails? 4 (P)   -SF 4  -MM    Moving from lying on back to sitting on the side of a flat bed without bedrails? 3  -CW 4  -MM    Moving to and from a bed to a chair (including a wheelchair)? 3 (P)   -SF 3  -MM    Standing up from a chair using your arms (e.g., wheelchair, bedside chair)? 3 (P)   -SF 3  -MM    Climbing 3-5 steps with a railing? 2 (P)   -SF 2  -MM    To walk in hospital room? 3 (P)   -SF 3  -MM    AM-PAC 6 Clicks Score (PT) 18  -CW 19  -MM    Highest Level of Mobility Goal 6 --> Walk 10 steps or more  -CW (r) SF (t) 6 --> Walk 10 steps or more  -MM      Row Name 09/04/24 1328          Modified [place] Scale    Modified Keren Scale 4 - Moderately severe disability.  Unable to walk without assistance, and unable to attend to own bodily needs without assistance.  -SM       Row Name 09/04/24 1328          Functional Assessment    Outcome Measure Options AM-PAC 6 Clicks Daily Activity (OT);Modified Keren  -               User Key  (r) = Recorded By, (t) = Taken By, (c) = Cosigned By      Initials Name Provider Type    Arleen Rausch, RN Registered Nurse    Amanda Hodges, OT Occupational Therapist    Carlotta Perdomo, PT Physical Therapist    Heike Cleaning, PT Student PT Student                                 Physical Therapy Education       Title: PT OT SLP Therapies (In Progress)       Topic: Physical Therapy (In Progress)       Point: Mobility training (Done)       Learning Progress Summary             Patient Acceptance, E, VU,NR by  at 9/4/2024 1424                         Point: Home exercise program (Not Started)       Learner Progress:  Not documented in this visit.              Point: Body mechanics (Not Started)       Learner Progress:  Not documented in this visit.               Point: Precautions (Not Started)       Learner Progress:  Not documented in this visit.                              User Key       Initials Effective Dates Name Provider Type Discipline     08/08/24 -  Heike Kay, PT Student PT Student PT                  PT Recommendation and Plan  Planned Therapy Interventions (PT): (P) balance training, bed mobility training, gait training, home exercise program, patient/family education, ROM (range of motion), stair training, strengthening, stretching, transfer training  Plan of Care Reviewed With: (P) patient  Outcome Evaluation: (P) Pt is a 69 year old male admitted with acute dizziness and hypotension. He lives at home with his wife and children and was independent in ADLs prior to admission. He reports no steps to enter the side door of his house, but he lives in a trilevel home with several stairs inside. He uses a cane for AMB at baseline. He reported multiple falls in the last 6 months. Upon examination, pt demonstrated decreased balance, strength, and activity tolerance. He sat up to EOB with SBA and stood with min A x 1 and rolling walker. He mentioned increased dizziness when moving from sitting to standing. He AMB 40 ft with min A x 1 and rolling walker. He demonstrated slow pace and unsteadiness but did not have any loss of balance. Following AMB he was assisted to his recliner and left with SLP. Pt would benefit from skilled PT to address stated deficits. PT recommends SNF at d/c.     Time Calculation:         PT Charges       Row Name 09/04/24 1421             Time Calculation    Start Time 1347 (P)   -SF      Stop Time 1359 (P)   -      Time Calculation (min) 12 min (P)   -SF      PT Received On 09/04/24 (P)   -SF      PT - Next Appointment 09/05/24 (P)   -      PT Goal Re-Cert Due Date 09/18/24 (P)   -SF         Time Calculation- PT    Total Timed Code Minutes- PT 8 minute(s) (P)   -SF                User Key  (r) = Recorded By, (t) = Taken By, (c) =  Cosigned By      Initials Name Provider Type    SF Heike Kay, PT Student PT Student                  Therapy Charges for Today       Code Description Service Date Service Provider Modifiers Qty    43636855517 HC PT EVAL MOD COMPLEXITY 3 9/4/2024 Heike Kay, PT Student GP 1    [id number]  PT THERAPEUTIC ACT EA 15 MIN 9/4/2024 Heike Kay, PT Student GP 1            PT G-Codes  Outcome Measure Options: AM-PAC 6 Clicks Daily Activity (OT), Modified Keren  AM-PAC 6 Clicks Score (PT): 18  AM-PAC 6 Clicks Score (OT): 17  Modified [place] Scale: 4 - Moderately severe disability.  Unable to walk without assistance, and unable to attend to own bodily needs without assistance.  PT Discharge Summary  Anticipated Discharge Disposition (PT): (P) skilled nursing facility    DENISSE Forde  9/4/2024

## 2024-09-04 NOTE — H&P
Patient Name:  Carlito Mo  YOB: 1955  MRN:  [id number]  Admit Date:  9/3/2024  Patient Care Team:  Belle Simons APRN as PCP - General (Family Medicine)  Amber Murguia MD as Consulting Physician (Cardiology)  Sera La BRYAN as Pharmacist  Seth Ladd MD as Surgeon (General Surgery)  Kim Hoyos MD PhD as Consulting Physician (Hematology and Oncology)  Jerome Diallo MD as Referring Physician (Family Medicine)  Jose Enrique Louie MD as Consulting Physician (Gastroenterology)  Nima Huddleston MD as Consulting Physician (Nephrology)      Subjective   History Present Illness     Chief Complaint   Patient presents with   • Dizziness   • Hypotension       Mr. Mo is a 69 y.o. male with history of ESRD on HD, PFO, PAF supposed to be maintained on Eliquis, prior strokes, hypertension, chronic CHF and DM2 who was admitted after complaining of dizziness following HD.  Apparently he had been hypotensive there per history though never had any hypotension here.  I tried to walk him in ER and could not do so.  He says he has been up walking with PT today a little bit and feels slightly better though still a bit dizzy.  He had been nauseated and vomited a couple times.  He feels like his coordination is a bit off.  Denies any blurry vision.  Patient admits that he sometimes forgets to take his Eliquis and some of his other medicines.  He was admitted for additional workup.  I came by earlier to see him and he was in MRI.  That test has since come back showing a cerebellar stroke. I discussed this finding with Dr. Aldana.    Review of Systems   Constitutional:  Positive for fatigue. Negative for chills and fever.   HENT:  Negative for congestion and trouble swallowing.    Eyes:  Negative for visual disturbance.   Respiratory:  Negative for cough, chest tightness, shortness of breath and wheezing.    Cardiovascular:  Negative for chest pain and palpitations.   Gastrointestinal:   Negative for abdominal distention, abdominal pain, constipation, diarrhea, nausea and vomiting.   Genitourinary:  Negative for dysuria, frequency and urgency.   Musculoskeletal:  Negative for arthralgias.   Skin:  Negative for rash.   Neurological:  Positive for dizziness and weakness. Negative for headaches.   Psychiatric/Behavioral:  Negative for agitation and confusion.    All other systems reviewed and are negative.       Personal History     Past Medical History:   Diagnosis Date   • Acquired hypothyroidism    • Acute sinusitis    • SYLVAIN (acute kidney injury)     on CKD   • Anemia    • Arthritis    • Atrial fibrillation    • CAD (coronary artery disease)    • Chronic combined systolic and diastolic congestive heart failure    • COPD (chronic obstructive pulmonary disease)    • Depression    • Diabetic neuropathy 04/11/2022    Diabetes mellitus due to underlying condition   • Esophagitis 06/10/2020    Added automatically from request for surgery [id number]     • ESRD (end stage renal disease) 12/01/2023   • Frequent PVCs    • Generalized weakness    • GERD (gastroesophageal reflux disease)    • Helicobacter pylori gastritis 06/04/2020   • Hyperlipidemia    • Hypertension    • Hypertensive encephalopathy    • Pleural effusion    • Pneumonia    • Poor historian    • RBBB (right bundle branch block)    • Stroke     POOR VISION   • TIA (transient ischemic attack)    • Type 2 diabetes mellitus    • Urinary retention    • Vitamin D deficiency      Past Surgical History:   Procedure Laterality Date   • APPENDECTOMY N/A 02/14/2016    Dr. Alexey Dodson   • ARTERIOVENOUS FISTULA/SHUNT SURGERY Right 06/03/2022    Procedure: RIGHT FOREARM ARTERIOVENOUS FISTULA PLACEMENT RADIOCEPHALIC WITH CEPHALIC VEIN TRANSPOSITION;  Surgeon: Eliseo Willis MD;  Location: [facility];  Service: Vascular;  Laterality: Right;   • COLONOSCOPY      over 20 years ago.  no polyps    • COLONOSCOPY N/A 09/18/2018    Procedure: COLONOSCOPY;   Surgeon: Jose Enrique Louie MD;  Location: [facility] ENDOSCOPY;  Service: Gastroenterology   • COLONOSCOPY N/A 2018    IH, EH, polyps (TAs w/low grade dysplasia)   • COLONOSCOPY N/A 2020    Procedure: COLONOSCOPY;  Surgeon: Jose Enrique Louie MD;  Location[facility] END[facility];  Service: Gastroenterology;  Laterality: N/A;  Pre op-Anemia, Melena, History of Polyps  Post op-To Cecum/TI, Polyp, Poor Prep   • CORONARY ARTERY BYPASS GRAFT  2001   • ENDOSCOPY N/A 2020    Procedure: ESOPHAGOGASTRODUODENOSCOPY with biopsies;  Surgeon: Amanda Lovelace MD;  [facility];  Service: Gastroenterology;  Laterality: N/A;  pre-anemia, dark stools  post-esophagitis, hiatal hernia   • ENDOSCOPY N/A 09/15/2020    Procedure: ESOPHAGOGASTRODUODENOSCOPY WITH BIOPSIES;  Surgeon: Jose Enrique Louie MD;  Location: [facility] ENDOSCOPY;  Service: Gastroenterology;  Laterality: N/A;  pre: history of melena and esophagitis  post: mild esophagitis and gastritis, small hiatal hernia   • ENDOSCOPY N/A 2023    Procedure: ESOPHAGOGASTRODUODENOSCOPY with bx;  Surgeon: Quoc Elmore MD;  Location: [facility] ENDOSCOPY;  Service: Gastroenterology;  Laterality: N/A;  pre: Coffee-ground emesis  post: hiatal hernia, esophagitis   • HERNIA REPAIR Left 2019   • THORACENTESIS     • TONSILLECTOMY     • VASECTOMY       Family History   Problem Relation Age of Onset   • Diabetes Mother    • Hypertension Mother    • Macular degeneration Mother    • Alcohol abuse Father    • Cancer Father         lung,  age 65   • Heart disease Father    • Alcohol abuse Brother    • Cirrhosis Brother          age 50   • Liver cancer Brother 45   • Diabetes Son    • Kidney failure Son    • Autism Son    • Malig Hyperthermia Neg Hx      Social History     Tobacco Use   • Smoking status: Never   • Smokeless tobacco: Never   • Tobacco comments:     CAFFEINE - OCCAS. SODA   Vaping Use   • Vaping status: Never Used    Substance Use Topics   • Alcohol use: No     Comment: last drink 2 months ago   • Drug use: No     No current facility-administered medications on file prior to encounter.     Current Outpatient Medications on File Prior to Encounter   Medication Sig Dispense Refill   • apixaban (ELIQUIS) 2.5 MG tablet tablet Take 1 tablet by mouth Every 12 (Twelve) Hours. Indications: Atrial Fibrillation 60 tablet 11   • buPROPion XL (WELLBUTRIN XL) 150 MG 24 hr tablet Take 1 tablet by mouth Daily.     • carvedilol (COREG) 3.125 MG tablet Take 1 tablet by mouth 2 (Two) Times a Day.     • famotidine (PEPCID) 10 MG tablet Take 1 tablet by mouth 2 (Two) Times a Day As Needed for Heartburn.     • ipratropium-albuterol (COMBIVENT RESPIMAT)  MCG/ACT inhaler Inhale 1 puff 4 (Four) Times a Day As Needed for Wheezing.     • levothyroxine (SYNTHROID, LEVOTHROID) 75 MCG tablet Take 1 tablet by mouth Daily. 30 tablet 5   • rosuvastatin (CRESTOR) 10 MG tablet Take 1 tablet by mouth Daily.     • tamsulosin (FLOMAX) 0.4 MG capsule 24 hr capsule Take 1 capsule by mouth Daily.     • aspirin 81 MG EC tablet Take 1 tablet by mouth Daily.     • budesonide-formoterol (SYMBICORT) 160-4.5 MCG/ACT inhaler Inhale 2 puffs 2 (Two) Times a Day. 1 each 3   • pantoprazole (PROTONIX) 40 MG EC tablet Take 1 tablet by mouth 2 (Two) Times a Day Before Meals.     • vitamin D3 125 MCG (5000 UT) capsule capsule Take 1 capsule by mouth Daily.       Allergies   Allergen Reactions   • Prozac [Fluoxetine Hcl] Other (See Comments)     shaking       Objective    Objective     Vital Signs  Temp:  [96.4 °F (35.8 °C)-97.5 °F (36.4 °C)] 97.5 °F (36.4 °C)  Heart Rate:  [63-89] 67  Resp:  [15-18] 18  BP: (110-156)/(57-87) 147/78  SpO2:  [98 %-100 %] 100 %  on  Flow (L/min):  [1.5] 1.5;   Device (Oxygen Therapy): room air  Body mass index is 17.36 kg/m².    Physical Exam  Vitals reviewed.   Constitutional:       Appearance: He is underweight.      Comments: Frail and  chronically ill-appearing   Cardiovascular:      Rate and Rhythm: Normal rate and regular rhythm.      Heart sounds: No murmur heard.  Pulmonary:      Effort: No respiratory distress.      Breath sounds: Normal breath sounds. No wheezing.   Abdominal:      General: There is no distension.      Palpations: Abdomen is soft.      Tenderness: There is no abdominal tenderness.   Musculoskeletal:      Right lower leg: No edema.      Left lower leg: No edema.   Neurological:      Mental Status: He is alert.      Comments: Somewhat poor coordination.  Strength seems symmetric upper and lower extremities.   Psychiatric:      Comments: Flat affect         Results Review:  I reviewed the patient's new clinical results.  I reviewed the patient's new imaging results and agree with the interpretation.  I reviewed the patient's other test results and agree with the interpretation  I personally viewed and interpreted the patient's EKG/Telemetry data      Lab Results (last 24 hours)       Procedure Component Value Units Date/Time    CBC & Differential [[id number]]  (Abnormal) Collected: 09/03/24 2010    Specimen: Blood Updated: 09/03/24 2022    Narrative:      The following orders were created for panel order CBC & Differential.  Procedure                               Abnormality         Status                     ---------                               -----------         ------                     CBC Auto Differential[[id number]]        Abnormal            Final result                 Please view results for these tests on the individual orders.    Comprehensive Metabolic Panel [[id number]]  (Abnormal) Collected: 09/03/24 2010    Specimen: Blood Updated: 09/03/24 2041     Glucose 232 mg/dL      BUN 26 mg/dL      Creatinine 2.48 mg/dL      Sodium 138 mmol/L      Potassium 3.9 mmol/L      Chloride 97 mmol/L      CO2 29.3 mmol/L      Calcium 9.3 mg/dL      Total Protein 7.3 g/dL      Albumin 4.5 g/dL      ALT (SGPT) 15 U/L      AST  (SGOT) 15 U/L      Alkaline Phosphatase 86 U/L      Total Bilirubin 0.2 mg/dL      Globulin 2.8 gm/dL      A/G Ratio 1.6 g/dL      BUN/Creatinine Ratio 10.5     Anion Gap 11.7 mmol/L      eGFR 27.4 mL/min/1.73     Narrative:      GFR Normal >60  Chronic Kidney Disease <60  Kidney Failure <15      Single High Sensitivity Troponin T [[id number]]  (Abnormal) Collected: 09/03/24 2010    Specimen: Blood Updated: 09/03/24 2049     HS Troponin T 120 ng/L     Narrative:      High Sensitive Troponin T Reference Range:  <14.0 ng/L- Negative Female for AMI  <22.0 ng/L- Negative Male for AMI  >=14 - Abnormal Female indicating possible myocardial injury.  >=22 - Abnormal Male indicating possible myocardial injury.   Clinicians would have to utilize clinical acumen, EKG, Troponin, and serial changes to determine if it is an Acute Myocardial Infarction or myocardial injury due to an underlying chronic condition.         CBC Auto Differential [[id number]]  (Abnormal) Collected: 09/03/24 2010    Specimen: Blood Updated: 09/03/24 2022     WBC 6.67 10*3/mm3      RBC 4.17 10*6/mm3      Hemoglobin 11.5 g/dL      Hematocrit 36.2 %      MCV 86.8 fL      MCH 27.6 pg      MCHC 31.8 g/dL      RDW 13.4 %      RDW-SD 41.5 fl      MPV 10.1 fL      Platelets 293 10*3/mm3      Neutrophil % 80.3 %      Lymphocyte % 11.2 %      Monocyte % 7.3 %      Eosinophil % 0.4 %      Basophil % 0.1 %      Immature Grans % 0.7 %      Neutrophils, Absolute 5.34 10*3/mm3      Lymphocytes, Absolute 0.75 10*3/mm3      Monocytes, Absolute 0.49 10*3/mm3      Eosinophils, Absolute 0.03 10*3/mm3      Basophils, Absolute 0.01 10*3/mm3      Immature Grans, Absolute 0.05 10*3/mm3      nRBC 0.0 /100 WBC     High Sensitivity Troponin T 2Hr [[id number]]  (Abnormal) Collected: 09/03/24 2214    Specimen: Blood Updated: 09/03/24 2249     HS Troponin T 123 ng/L      Troponin T Delta 3 ng/L     Narrative:      High Sensitive Troponin T Reference Range:  <14.0 ng/L- Negative Female  for AMI  <22.0 ng/L- Negative Male for AMI  >=14 - Abnormal Female indicating possible myocardial injury.  >=22 - Abnormal Male indicating possible myocardial injury.   Clinicians would have to utilize clinical acumen, EKG, Troponin, and serial changes to determine if it is an Acute Myocardial Infarction or myocardial injury due to an underlying chronic condition.         POC Glucose Once [[id number]]  (Abnormal) Collected: 09/04/24 0307    Specimen: Blood Updated: 09/04/24 0307     Glucose 215 mg/dL     CBC (No Diff) [[id number]]  (Abnormal) Collected: 09/04/24 0416    Specimen: Blood Updated: 09/04/24 0515     WBC 5.96 10*3/mm3      RBC 3.67 10*6/mm3      Hemoglobin 10.3 g/dL      Hematocrit 31.4 %      MCV 85.6 fL      MCH 28.1 pg      MCHC 32.8 g/dL      RDW 13.4 %      RDW-SD 41.1 fl      MPV 10.5 fL      Platelets 259 10*3/mm3     Comprehensive Metabolic Panel [[id number]]  (Abnormal) Collected: 09/04/24 0416    Specimen: Blood Updated: 09/04/24 0540     Glucose 185 mg/dL      BUN 38 mg/dL      Creatinine 3.13 mg/dL      Sodium 137 mmol/L      Potassium 4.0 mmol/L      Chloride 97 mmol/L      CO2 28.0 mmol/L      Calcium 9.1 mg/dL      Total Protein 6.6 g/dL      Albumin 4.1 g/dL      ALT (SGPT) 11 U/L      AST (SGOT) 11 U/L      Alkaline Phosphatase 84 U/L      Total Bilirubin 0.2 mg/dL      Globulin 2.5 gm/dL      A/G Ratio 1.6 g/dL      BUN/Creatinine Ratio 12.1     Anion Gap 12.0 mmol/L      eGFR 20.7 mL/min/1.73     Narrative:      GFR Normal >60  Chronic Kidney Disease <60  Kidney Failure <15      Hemoglobin A1c [[id number]]  (Abnormal) Collected: 09/04/24 0416    Specimen: Blood Updated: 09/04/24 0529     Hemoglobin A1C 7.40 %     Narrative:      Hemoglobin A1C Ranges:    Increased Risk for Diabetes  5.7% to 6.4%  Diabetes                     >= 6.5%  Diabetic Goal                < 7.0%    Lipid Panel [[id number]]  (Abnormal) Collected: 09/04/24 0416    Specimen: Blood Updated: 09/04/24 0540     Total  Cholesterol 182 mg/dL      Triglycerides 96 mg/dL      HDL Cholesterol 53 mg/dL      LDL Cholesterol  112 mg/dL      VLDL Cholesterol 17 mg/dL      LDL/HDL Ratio 2.07    Narrative:      Cholesterol Reference Ranges  (U.S. Department of Health and Human Services ATP III Classifications)    Desirable          <200 mg/dL  Borderline High    200-239 mg/dL  High Risk          >240 mg/dL      Triglyceride Reference Ranges  (U.S. Department of Health and Human Services ATP III Classifications)    Normal           <150 mg/dL  Borderline High  150-199 mg/dL  High             200-499 mg/dL  Very High        >500 mg/dL    HDL Reference Ranges  (U.S. Department of Health and Human Services ATP III Classifications)    Low     <40 mg/dl (major risk factor for CHD)  High    >60 mg/dl ('negative' risk factor for CHD)        LDL Reference Ranges  (U.S. Department of Health and Human Services ATP III Classifications)    Optimal          <100 mg/dL  Near Optimal     100-129 mg/dL  Borderline High  130-159 mg/dL  High             160-189 mg/dL  Very High        >189 mg/dL    TSH [[id number]]  (Normal) Collected: 09/04/24 0416    Specimen: Blood Updated: 09/04/24 0550     TSH 3.010 uIU/mL     POC Glucose Once [[id number]]  (Abnormal) Collected: 09/04/24 0552    Specimen: Blood Updated: 09/04/24 0553     Glucose 153 mg/dL     POC Glucose Once [[id number]]  (Abnormal) Collected: 09/04/24 1218    Specimen: Blood Updated: 09/04/24 1219     Glucose 164 mg/dL             Imaging Results (Last 24 Hours)       Procedure Component Value Units Date/Time    MRI Brain Without Contrast [[id number]] Collected: 09/04/24 1232     Updated: 09/04/24 1232    Narrative:      STAT MRI OF THE BRAIN WITHOUT CONTRAST ON 09/04/2024     CLINICAL HISTORY: This is a 69-year-old male patient who has a prior  history of stroke and has acute dizziness after dialysis on 09/03/2024,  evaluate for acute stroke.     TECHNIQUE: Axial T1, FLAIR, fat-suppressed T2, axial  diffusion and  gradient echo T2 and sagittal T1-weighted images were obtained of the  entire head.     This is correlated to a prior MRI of the brain from [facility] on 09/17/2022.     FINDINGS: There is extensive patchy and confluent T2 high signal  throughout the periventricular and subcortical white matter of the  cerebral hemispheres compatible with severe small vessel disease. There  are tiny old lacunar infarcts in the mid right corona radiata region and  in the thalami bilaterally. There is a 2.5 cm old superior parietal  infarct in the left MCA territory. There is a 2.4 x 1.1 cm old superior  medial right cerebellar infarct and an additional 1.8 x 0.5 cm old  superior right vermian cerebellar infarct in the right superior  cerebellar artery territory. There is a 7 x 2 mm old posterior inferior  lateral right cerebellar infarct in the right PICA territory and there  is a 14 x 4 mm old posterior inferior lateral left cerebellar infarct in  the left PICA territory, and these are all unchanged since prior MRI  09/17/2022. There is an ovoid area of intense increased signal intensity  on the diffusion weighted images projecting over the mid left cerebellar  white matter that measures 5 mm in size. It is a little dark on the ADC  maps and bright on the FLAIR images and is compatible with a tiny acute  mid left cerebellar white matter infarct. There is mild diffuse cerebral  atrophy.  The lateral and third ventricles are mildly prominent in size,  felt to be on the basis of central volume loss or atrophy. I see no mass  effect and no midline shift. No extra-axial fluid collections are  identified. Calvarium and skull base are normal in appearance. There is  a 2.5 cm mucous retention cyst in the posterior right maxillary sinus.  Otherwise, the paranasal sinuses and the mastoid air cells and the  middle ear cavities are clear. There are bilateral intraocular lens  implants in the globes from previous  cataract surgery, otherwise the  orbits are normal in appearance. There is a tiny punctate 2 mm focus of  signal loss on the gradient echo T2 weighted images in the right lateral  malcolm, likely a punctate area of hemosiderin deposition due to an old  hypertensive microhemorrhage and there is a 4 mm rounded focus of signal  loss in the inferior medial left cerebellum on the gradient echo T2  weighted images compatible with an additional focus of hemosiderin  deposition probably secondary to old hypertensive microhemorrhages.   There is a cluster of nodular foci of signal loss in the right and left  thalami compatible with multiple punctate old microhemorrhages in the  thalami bilaterally likely secondary to hypertension.  There is a 5 mm  rounded focus of signal loss in the superior lateral right occipital  juxtacortical white matter compatible with a tiny old microhemorrhage at  this site and given this location it could be secondary to amyloid.        Impression:      1. Since the prior MRI of the brain on 09/17/2022 there has been  development of a 5 mm rounded diffusion hyperintense focus in the mid  left cerebellum.  This is most compatible with a tiny acute mid left  cerebellar white matter infarct. Otherwise, there has been no  significant change.     2. There is severe small vessel disease in the cerebral white matter.  There are old lacunar infarcts in the mid right corona radiata region  and the thalami bilaterally. There are multiple old bilateral cerebellar  infarcts including a 2.4 x 1.1 cm old superior medial right cerebellar  infarct and an additional 1.8 x 0.5 cm old superior right cerebellar  vermian infarct and these are both in the right superior cerebellar  artery territory. There is an additional 7 x 2 mm old posterior inferior  lateral right cerebellar infarct in the right PICA territory and there  is a 14 x 4 mm old posterior inferior lateral left cerebellar infarct in  the left PICA  territory.     3. There are multiple punctate foci of hemosiderin deposition from tiny  old microhemorrhages with 4 in the left thalamus and 4 in the right  thalamus, and a 2 mm focus in the right side of the malcolm and these are  good locations for old hypertensive microhemorrhages. There is a 4 mm  focus in the inferior medial left cerebellum and a 4-5 mm rounded focus  in the superior lateral right occipital juxtacortical white matter that  could be secondary to amyloid. There is diffuse cerebral atrophy. The  remainder of the MRI of the brain is normal. I communicated the results  to Eliseo Card MD, by telephone on 09/04/2024 at 11:50 a.m.       CT Head Without Contrast [[id number]] Collected: 09/04/24 0835     Updated: 09/04/24 0835    Narrative:      EMERGENCY CT SCAN OF THE HEAD WITHOUT CONTRAST 09/04/2024     CLINICAL HISTORY: Ataxia and dizziness.     TECHNIQUE: Spiral CT images were obtained from the base of the skull to  the vertex without intravenous contrast. The images were reformatted and  are submitted in 3 mm thick axial, sagittal and coronal CT sections with  brain algorithm.     This is correlated to a prior head CT from [facility] on  01/04/2024 and a prior MRI of the brain on 09/17/2022.     FINDINGS: There are extensive patchy and confluent areas of low-density  throughout the cerebral white matter consistent with severe small vessel  disease. There is a chronic area of encephalomalacia in the superior  left parietal cortex measuring 2.5 x 1.8 cm in size compatible with an  old superior left parietal cortical infarct, unchanged dating back to  MRI of the brain 09/17/2022. It is in the left MCA territory.  Furthermore there is a 2.7 x 1.3 cm chronic infarct in the superomedial  right cerebellum in the right superior cerebellar artery territory and  there is a 18 x 5 mm chronic infarct in the posterolateral left  cerebellum in the left PICA territory, unchanged since MRI of  the brain  09/17/2022. There are tiny old lacunar infarcts in the thalami  bilaterally unchanged. There is diffuse cerebral atrophy. The ventricles  are normal in size. I see no mass effect and no midline shift and no  extra-axial fluid collections are identified and there is no evidence of  acute intracranial hemorrhage. There is a 2.5 cm mucous retention cyst  in the posterior right maxillary sinus. The remainder of the paranasal  sinuses, mastoid air cells and middle ear cavities are clear. The  calvarium and skull base are normal in appearance. There is a chronic  sebaceous cyst in the posterior superior left parietal scalp measuring  18 x 14 mm in size.       Impression:      1. No convincing acute intracranial abnormality is identified with no  discernible change when compared to prior MRI of the brain on  09/17/2022.     2. There is severe small vessel disease in the cerebral white matter.  There is a 2.5 x 1.8 cm old superior left parietal infarct in the left  MCA territory. There is a 2.7 x 1.3 cm old superomedial right cerebellar  infarct in the right superior cerebellar artery territory and there is  an 18 x 5 mm old posterolateral left cerebellar infarct in the left PICA  territory and there are small old lacunar infarcts in the thalami  bilaterally. There is diffuse cerebral atrophy.     3. There is a 2.5 cm mucous retention cyst in the posterior right  maxillary sinus and there is a sebaceous cyst in the posterior superior  left parietal scalp measuring 18 x 14 x 19 mm in size. The remainder of  the head CT is normal. The etiology of the ataxia and dizziness is not  established on this exam and if there remains any clinical suspicion of  an acute infarct, I recommend an MRI of the brain for more complete  assessment. The findings and recommendations were communicated to  Eliseo Johnson MD, by telephone on 09/04/2024 at 8:20 a.m.     Radiation dose reduction techniques were utilized, including  automated  exposure control and exposure modulation based on body size.                  Results for orders placed during the hospital encounter of 09/03/24    Adult transthoracic echo complete    Interpretation Summary  •  Left ventricular systolic function is mildly decreased. Calculated left ventricular EF = 44.7%. The following left ventricular wall segments are hypokinetic: apical inferior, mid inferior and apical septal.  •  Left ventricular diastolic function is consistent with (grade Ia w/high LAP) impaired relaxation.  •  There is a focal area of thickening and calcification on the anterior mitral valve without any significant stenosis or regurgitation.  •  Saline test results are negative for right to left atrial level shunt.      ECG 12 Lead Other; Dizziness   Final Result   HEART RATE=82  bpm   RR Kyihjikp=373  ms   VA Jhpwapxr=853  ms   P Horizontal Axis=  deg   P Front Axis=Invalid  deg   QRSD Xomtkmqz=797  ms   QT Enbnshzq=652  ms   LIrB=771  ms   QRS Axis=-73  deg   T Wave Axis=73  deg   - ABNORMAL ECG -   Sinus rhythm   Atrial premature complex   Right bundle branch block   Anterior  infarct, age indeterminate   When compared with ECG of 23-May-2024 02:15:09,   No significant change   Electronically Signed By: Romeo Pham ([facility]) 2024-09-04 15:08:10   Date and Time of Study:2024-09-03 20:09:52      Telemetry Scan   Final Result           Assessment/Plan     Active Hospital Problems    Diagnosis  POA   • **Cerebellar stroke, acute [I63.9]  Yes   • Medically noncompliant [Z91.199]  Not Applicable   • Ataxia [R27.0]  Yes   • Chronic HFrEF (heart failure with reduced ejection fraction) [I50.22]  Yes   • ESRD (end stage renal disease) [N18.6]  Yes   • Paroxysmal atrial fibrillation [I48.0]  Yes   • Patent foramen ovale [Q21.12]  Not Applicable   • Anemia, chronic disease [D63.8]  Yes   • Essential hypertension [I10]  Yes   • Acquired hypothyroidism [E03.9]  Yes   • Type 2 diabetes mellitus, with long-term  current use of insulin [E11.9, Z79.4]  Not Applicable      Resolved Hospital Problems   No resolved problems to display.       Mr. Mo is a 69 y.o. male with history of CHF, ESRD, DM 2, PAF and other issues presenting with dizziness and difficulty walking and found to have new acute cerebellar stroke in the setting of multiple prior strokes and markedly abnormal MRI as shown above.    Stroke likely caused by noncompliance with medical therapy.  Neurology recommendations noted.  Patient has multiple bilateral microhemorrhages concerning for amyloidosis but also numerous old strokes and new acute ischemic left cerebellar infarct.  Start aspirin and statin when able.  Speech therapy currently recommending n.p.o. status until VFSS so holding Eliquis for now  Echo reviewed.  Look stable from last echo March 2023.  No right-to-left shunt noted despite documented history of PFO.  CTA head and neck pending  DM2 stable.  Correctional insulin available as needed.  A1c 7.4  CHF with stable appearing volume status at the present time  Troponin elevation but flat on recheck and actually similar to prior levels so do not plan any additional ischemic workup  Nephrology consult for dialysis management.  His normal schedule should be tomorrow I believe  HTN well-controlled.  Parameters per neurology recommendations.  PT and OT eval's reviewed.  SNF recommended  Resume home meds once taking p.o.  Will need to make sure he has good system for maintaining medication compliant after he is discharged home.  He says that his wife is going to take a more active role in managing his medicines.  She is not present at bedside during my evaluation.      VTE Prophylaxis - SCDs.  Code Status - Full code.       Eliseo Card MD  [facility]  09/04/24  15:23 EDT

## 2024-09-04 NOTE — CASE MANAGEMENT/SOCIAL WORK
Discharge Planning Assessment  [facility]     Patient Name: Carlito Mo  MRN: [id number]  Today's Date: 9/4/2024    Admit Date: 9/3/2024    Plan: SNF (w/ HD on-site), evals pending.   Discharge Needs Assessment       Row Name 09/04/24 1714       Living Environment    People in Home child(carlos), adult;spouse    Name(s) of People in Home Lissette West/spouse; 37 year old son Mohamud; autistic 41 yo old son    Current Living Arrangements home    Potentially Unsafe Housing Conditions none    In the past 12 months has the electric, gas, oil, or water company threatened to shut off services in your home? No    Primary Care Provided by self;spouse/significant other    Provides Primary Care For no one, unable/limited ability to care for self    Family Caregiver if Needed spouse    Family Caregiver Names Lissette/spouse    Quality of Family Relationships helpful;supportive    Able to Return to Prior Arrangements yes       Resource/Environmental Concerns    Resource/Environmental Concerns home accessibility    Home Accessibility Concerns stairs to access bedroom or bathroom  3 entrance stair steps and 9 stair steps to his bedroom.    Transportation Concerns none       Transportation Needs    In the past 12 months, has lack of transportation kept you from medical appointments or from getting medications? no    In the past 12 months, has lack of transportation kept you from meetings, work, or from getting things needed for daily living? No       Food Insecurity    Within the past 12 months, you worried that your food would run out before you got the money to buy more. Never true    Within the past 12 months, the food you bought just didn't last and you didn't have money to get more. Never true       Transition Planning    Patient/Family Anticipates Transition to home with family    Patient/Family Anticipated Services at Transition skilled nursing;home health care    Transportation Anticipated family or friend will provide        Discharge Needs Assessment    Current Outpatient/Agency/Support Group outpatient hemodialysis  [facility] [address]    Equipment Currently Used at Home cpap;cane, straight;glucometer;oxygen;walker, rolling;bp cuff    Concerns to be Addressed discharge planning    Anticipated Changes Related to Illness inability to care for self    Equipment Needed After Discharge none    Outpatient/Agency/Support Group Needs skilled nursing facility    Patient's Choice of Community Agency(s) Pt agreed to referrals for SNF being sent to [facility], [facility], [facility] and [facility].    Current Discharge Risk chronically ill                   Discharge Plan       Row Name 09/04/24 1719       Plan    Plan SNF (w/ HD on-site), evals pending.    Plan Comments CCP spoke with patient at bedside.  Explained role of CCP and discharge planning discussed.  Information on face sheet verified.  Patient stated he is IADL's, a retired  & .  Patient lives with spouse and two adult sons (Mohamud 36yo & 41yo disabled son), in a tri-level home with three entrance stair steps and nine steps to his bedroom.  Patient is current with outpatient hemodialysis at [facility] on [address].  Pt spouse transports him to and from HD.  Patient's pharmacy confirmed as, [facility] in [facility].  Patient has the following DME- CPAP & oxygen ([employer]), BP cuff, glucometer, straight cane (at bedside), four-post cane and rolling walker.  Patient has been to [facility] and [facility] in the past.  Pt has used [facility] in past.  Physical therapy is recommending patient go to skilled rehab at discharge.  Patient agreeable to CCP sending referrals to 1. [facility], 2. [facility], 3. [facility] and 4. [facility].  Referrals sent and pending.  Patient will require precert for SNF.  Patient denies current discharge needs.  CCP will continue to follow….…Ame BERNAL /JAYDE                   Continued Care and Services - Admitted Since 9/3/2024       Destination       Service Provider Request Status Selected Services Address Phone Fax Patient Preferred    [facility] Accepted N/A [address], [place] [address] [phone] --    [facility] Pending - Request Sent N/A [address], [place] [address] [phone] --    [facility] P[facility] - Request Sent N/A [address], [facility] [id number] [phone] --    [facility] Pending - Request Sent N/A [address], [place] [address] [phone] --       Internal Comment last updated by Ame Cardoza RN 9/4/2024 1637    Skilled rehab with HD on site                               Expected Discharge Date and Time       Expected Discharge Date Expected Discharge Time    Sep 6, 2024            Demographic Summary       Row Name 09/04/24 1631       General Information    Admission Type inpatient    Arrived From emergency department    Required Notices Provided Important Message from Medicare    Referral Source admission list;high risk screening;nursing    Reason for Consult discharge planning    Preferred Language English       Contact Information    Permission Granted to Share Info With family/designee                   Functional Status       Row Name 09/04/24 1714       Employment/    Employment/ Comments Retired  &       Row Name 09/04/24 1633       Functional Status    Usual Activity Tolerance fair    Current Activity Tolerance fair       Assessment of Health Literacy    How often do you have someone help you read hospital materials? Sometimes    Health Literacy Moderate       Functional Status, IADL    Medications independent    Meal Preparation assistive person    Housekeeping assistive person    Laundry assistive person     Shopping assistive person       Mental Status    General Appearance WDL WDL       Mental Status Summary    Recent Changes in Mental Status/Cognitive Functioning no changes       Employment/    Employment Status retired                   Psychosocial    No documentation.                  Abuse/Neglect    No documentation.                  Legal    No documentation.                  Substance Abuse    No documentation.                  Patient Forms    No documentation.                     Ame Cardoza RN

## 2024-09-04 NOTE — PLAN OF CARE
Goal Outcome Evaluation:  Plan of Care Reviewed With: patient           Outcome Evaluation: Clinical swallow eval completed. Recommend NPO with meds crushed with puree. Patient requesting diet despite NPO recommendations- MD please address. If MD ok with diet, recommend mechanical soft and thin with known risk of aspiration. Recommend VFSS next date to assess safety of PO.      Anticipated Discharge Disposition (SLP): unknown          SLP Swallowing Diagnosis: R/O pharyngeal dysphagia (09/04/24 3230)

## 2024-09-04 NOTE — PLAN OF CARE
Goal Outcome Evaluation:         Patient denies pain nor discomfort, vitals sign stable, NIH 0, cont on I/v fluid , SR on the monitor.

## 2024-09-04 NOTE — THERAPY EVALUATION
Acute Care - Speech Language Pathology   Swallow Initial Evaluation [facility]     Patient Name: Carlito Mo  : 1955  MRN: [id number]  Today's Date: 2024               Admit Date: 9/3/2024    Visit Dx:     ICD-10-CM ICD-9-CM   1. Ataxia  R27.0 781.3     Patient Active Problem List   Diagnosis    Major depressive disorder with single episode, in partial remission    Other hyperlipidemia    Type 2 diabetes mellitus, with long-term current use of insulin    Vitamin D deficiency    Erectile dysfunction    Chronic fatigue    Type 2 diabetes mellitus with ophthalmic complication    Benign prostatic hyperplasia with nocturia    History of basal cell carcinoma    Acquired hypothyroidism    Recurrent strokes    Suspected sleep apnea    YAW (obstructive sleep apnea)    Coronary artery disease involving native coronary artery of native heart without angina pectoris    Chronically elevated troponin    Colon cancer screening    Enlarged lymph nodes    Functional gait abnormality    History of myocardial infarction    Insomnia    Iron deficiency anemia    Major depression, recurrent    Essential hypertension    Acute on chronic renal insufficiency    Falls frequently    Acute on chronic anemia    Neurogenic orthostatic hypotension    Anemia, chronic disease    History of colon polyps    Helicobacter pylori gastritis    Esophagitis    Embolic stroke    Patent foramen ovale    Cardiomyopathy    Diarrhea    Generalized weakness    Paroxysmal atrial fibrillation    Chronic anticoagulation    Acute ischemic stroke    History of stroke    Iron deficiency anemia    Acute HFrEF (heart failure with reduced ejection fraction)    ESRD (end stage renal disease)    Hemoptysis    Chronic HFrEF (heart failure with reduced ejection fraction)    Hemodialysis patient    Exertional dyspnea    Severe malnutrition    Ataxia    Cerebellar stroke, acute    Medically noncompliant     Past Medical History:   Diagnosis Date    Acquired  hypothyroidism     Acute sinusitis     SYLVAIN (acute kidney injury)     on CKD    Anemia     Arthritis     Atrial fibrillation     CAD (coronary artery disease)     Chronic combined systolic and diastolic congestive heart failure     COPD (chronic obstructive pulmonary disease)     Depression     Diabetic neuropathy 04/11/2022    Diabetes mellitus due to underlying condition    Esophagitis 06/10/2020    Added automatically from request for surgery [id number]      ESRD (end stage renal disease) 12/01/2023    Frequent PVCs     Generalized weakness     GERD (gastroesophageal reflux disease)     Helicobacter pylori gastritis 06/04/2020    Hyperlipidemia     Hypertension     Hypertensive encephalopathy     Pleural effusion     Pneumonia     Poor historian     RBBB (right bundle branch block)     Stroke     POOR VISION    TIA (transient ischemic attack)     Type 2 diabetes mellitus     Urinary retention     Vitamin D deficiency      Past Surgical History:   Procedure Laterality Date    APPENDECTOMY N/A 02/14/2016    Dr. Alexey Dodson    ARTERIOVENOUS FISTULA/SHUNT SURGERY Right 06/03/2022    Procedure: RIGHT FOREARM ARTERIOVENOUS FISTULA PLACEMENT RADIOCEPHALIC WITH CEPHALIC VEIN TRANSPOSITION;  Surgeon: Eliseo Willis MD;  Location: [facility] MAIN OR;  Service: Vascular;  Laterality: Right;    COLONOSCOPY      over 20 years ago.  no polyps     COLONOSCOPY N/A 09/18/2018    Procedure: COLONOSCOPY;  Surgeon: Jose Enrique Louie MD;  Location: [facility] ENDOSCOPY;  Service: Gastroenterology    COLONOSCOPY N/A 09/19/2018    IH, EH, polyps (TAs w/low grade dysplasia)    COLONOSCOPY N/A 06/01/2020    Procedure: COLONOSCOPY;  Surgeon: Jose Enrique Louie MD;  Location: [facility] ENDOSCOPY;  Service: Gastroenterology;  Laterality: N/A;  Pre op-Anemia, Melena, History of Polyps  Post op-To Cecum/TI, Polyp, Poor Prep    CORONARY ARTERY BYPASS GRAFT  11/2001    ENDOSCOPY N/A 05/29/2020    Procedure: ESOPHAGOGASTRODUODENOSCOPY with  biopsies;  Surgeon: Amanda Lovelace MD;  Location:  [facility];  Service: Gastroenterology;  Laterality: N/A;  pre-anemia, dark stools  post-esophagitis, hiatal hernia    ENDOSCOPY N/A 09/15/2020    Procedure: ESOPHAGOGASTRODUODENOSCOPY WITH BIOPSIES;  Surgeon: Jose Enrique Louie MD;  Location:  [facility];  Service: Gastroenterology;  Laterality: N/A;  pre: history of melena and esophagitis  post: mild esophagitis and gastritis, small hiatal hernia    ENDOSCOPY N/A 12/4/2023    Procedure: ESOPHAGOGASTRODUODENOSCOPY with bx;  Surgeon: Quoc Elmore MD;  Location:  [facility];  Service: Gastroenterology;  Laterality: N/A;  pre: Coffee-ground emesis  post: hiatal hernia, esophagitis    HERNIA REPAIR Left 12/05/2019    THORACENTESIS      TONSILLECTOMY      VASECTOMY         SLP Recommendation and Plan  SLP Swallowing Diagnosis: R/O pharyngeal dysphagia (09/04/24 1330)  SLP Diet Recommendation: NPO, other (see comments) (patient requesting PO despite aspiration risks- MD advise) (09/04/24 1330)     SLP Rec. for Method of Medication Administration: meds crushed, with puree, as tolerated (09/04/24 1330)     Monitor for Signs of Aspiration: yes, notify SLP if any concerns (09/04/24 1330)  Recommended Diagnostics: VFSS ([facility]) (09/04/24 1330)  Swallow Criteria for Skilled Therapeutic Interventions Met: demonstrates skilled criteria (09/04/24 1330)  Anticipated Discharge Disposition (SLP): unknown (09/04/24 1330)  Rehab Potential/Prognosis, Swallowing: good, to achieve stated therapy goals (09/04/24 1330)  Therapy Frequency (Swallow): PRN (09/04/24 1330)  Predicted Duration Therapy Intervention (Days): until discharge (09/04/24 1330)  Oral Care Recommendations: Oral Care BID/PRN (09/04/24 1330)                                        Plan of Care Reviewed With: patient  Outcome Evaluation: Clinical swallow eval completed. Recommend NPO with meds crushed with puree. Patient requesting diet despite NPO  "recommendations- MD please address. If MD ok with diet, recommend mechanical soft and thin with known risk of aspiration. Recommend VFSS next date to assess safety of PO.      SWALLOW EVALUATION (Last 72 Hours)       SLP Adult Swallow Evaluation       Row Name 09/04/24 2818                   Rehab Evaluation    Document Type evaluation  -OC        Subjective Information no complaints  -OC        Patient Observations alert;agree to therapy;cooperative  -OC        Patient Effort good  -OC        Symptoms Noted During/After Treatment none  -OC           General Information    Patient Profile Reviewed yes  -OC        Pertinent History Of Current Problem Patient admitted with ataxia. \"Since the prior MRI of the brain on 09/17/2022 there has been  development of a 5 mm rounded diffusion hyperintense focus in the mid  left cerebellum.  This is most compatible with a tiny acute mid left  cerebellar white matter infarct.\"  -OC        Current Method of Nutrition NPO  -OC        Precautions/Limitations, Vision WFL;for purposes of eval  -OC        Precautions/Limitations, Hearing WFL;for purposes of eval  -OC        Prior Level of Function-Communication unknown  -OC        Prior Level of Function-Swallowing no diet consistency restrictions  -OC        Plans/Goals Discussed with patient;agreed upon  -OC        Barriers to Rehab none identified  -OC        Patient's Goals for Discharge return to PO diet  -OC           Pain Scale: Numbers Pre/Post-Treatment    Pretreatment Pain Rating 0/10 - no pain  -OC        Posttreatment Pain Rating 0/10 - no pain  -OC           Oral Motor Structure and Function    Dentition Assessment natural, present and adequate  -OC        Secretion Management WNL/WFL  -OC        Mucosal Quality moist, healthy  -OC        Volitional Swallow unable to elicit  -OC        Volitional Cough weak  -OC           Oral Musculature and Cranial Nerve Assessment    Oral Motor General Assessment generalized oral motor " weakness  -OC        Vocal Impairment, Detail. Cranial Nerve X (Vagus) --  weak vocal quality  -OC           Clinical Swallow Eval    Clinical Swallow Evaluation Summary Weak vocal quality. Patient demonstrated persistent throat clear and coughing with ice chips, thin via cup, nectar thick via cup, puree, and mechaical soft. No change in vocal quality appreciated. Immediate wet throat clear following all trials. Recommend VFSS to r/o aspiration.  -OC           SLP Evaluation Clinical Impression    SLP Swallowing Diagnosis R/O pharyngeal dysphagia  -OC        Functional Impact risk of aspiration/pneumonia  -OC        Rehab Potential/Prognosis, Swallowing good, to achieve stated therapy goals  -OC        Swallow Criteria for Skilled Therapeutic Interventions Met demonstrates skilled criteria  -OC           Recommendations    Therapy Frequency (Swallow) PRN  -OC        Predicted Duration Therapy Intervention (Days) until discharge  -OC        SLP Diet Recommendation NPO;other (see comments)  patient requesting PO despite aspiration risks- MD advise  -OC        Recommended Diagnostics VFSS (MBS)  -OC        Oral Care Recommendations Oral Care BID/PRN  -OC        SLP Rec. for Method of Medication Administration meds crushed;with puree;as tolerated  -OC        Monitor for Signs of Aspiration yes;notify SLP if any concerns  -OC        Anticipated Discharge Disposition (SLP) unknown  -OC           Swallow Goals (SLP)    Swallow LTGs Swallow Long Term Goal (free text)  -OC           (LTG) Swallow    (LTG) Swallow Tolerate least restrictive diet with no overt s/s aspiration.  -OC        [place] (Swallow Long Term Goal) independently (over 90% accuracy)  -OC        Time Frame (Swallow Long Term Goal) by discharge  -OC                  User Key  (r) = Recorded By, (t) = Taken By, (c) = Cosigned By      Initials Name Effective Dates    OC Mela Whitaker SLP 08/28/23 -                     EDUCATION  The patient has been  educated in the following areas:   Dysphagia (Swallowing Impairment).        SLP GOALS       Row Name 09/04/24 1330             (LTG) Swallow    (LTG) Swallow Tolerate least restrictive diet with no overt s/s aspiration.  -OC      [place] (Swallow Long Term Goal) independently (over 90% accuracy)  -OC      Time Frame (Swallow Long Term Goal) by discharge  -OC                User Key  (r) = Recorded By, (t) = Taken By, (c) = Cosigned By      Initials Name Provider Type    Mela Diaz SLP Speech and Language Pathologist                         Time Calculation:    Time Calculation- SLP       Row Name 09/04/24 1526             Time Calculation- SLP    SLP Start Time 1330  -OC      SLP Received On 09/04/24  -OC         Untimed Charges    SLP Eval/Re-eval  ST Eval Oral Pharyng Swallow - 67450  -OC      58923-JE Eval Oral Pharyng Swallow Minutes 60  -OC         Total Minutes    Untimed Charges Total Minutes 60  -OC       Total Minutes 60  -OC                User Key  (r) = Recorded By, (t) = Taken By, (c) = Cosigned By      Initials Name Provider Type    Mela Diaz SLP Speech and Language Pathologist                    Therapy Charges for Today       Code Description Service Date Service Provider Modifiers Qty    [id number] HC ST EVAL ORAL PHARYNG SWALLOW 4 9/4/2024 Mela Whitaker SLP GN 1                 HA Garibay  9/4/2024

## 2024-09-04 NOTE — DISCHARGE PLACEMENT REQUEST
"Carlito Mo (69 y.o. Male)       Date of Birth   1955    Social Security Number       Address   [address] [place] [id number]    Home Phone   [phone]    MRN   [id number]       [facility]    Marital Status                               Admission Date   9/3/24    Admission Type   Emergency    Admitting Provider   Eliseo Card MD    Attending Provider   Eliseo Card MD    [facility]       Discharge Date       Discharge Disposition       Discharge Destination                                 Attending Provider: Eliseo Card MD    Allergies: Prozac [Fluoxetine Hcl]    Isolation: None   Infection: None   Code Status: CPR    Ht: 182.9 cm (72\")   Wt: 58.1 kg (128 lb)    Admission Cmt: None   Principal Problem: Cerebellar stroke, acute [I63.9]                   Active Insurance as of 9/3/2024       Primary Coverage       Payor Plan Insurance Group Employer/Plan Group    ANTH MEDICARE REPLACEMENT ANTH MEDICARE ADVANTAGE [id number]       Payor Plan Address Payor Plan Phone Number Payor Plan Fax Number Effective Dates    [address] [phone]  2/1/2023 - None Entered    [facility] [address]         Subscriber Name Subscriber Birth Date Member ID       CARLITO MO 1955 [id number]                     Emergency Contacts        (Rel.) Home Phone Work Phone Mobile Phone    Lissette Mo (Spouse) [phone] -- [phone]                "

## 2024-09-05 ENCOUNTER — APPOINTMENT (OUTPATIENT)
Dept: GENERAL RADIOLOGY | Facility: HOSPITAL | Age: 69
DRG: 064 | End: 2024-09-05
Payer: MEDICARE

## 2024-09-05 LAB
ALBUMIN SERPL-MCNC: 3.9 G/DL (ref 3.5–5.2)
ANION GAP SERPL CALCULATED.3IONS-SCNC: 10 MMOL/L (ref 5–15)
BUN SERPL-MCNC: 51 MG/DL (ref 8–23)
BUN/CREAT SERPL: 12.8 (ref 7–25)
CALCIUM SPEC-SCNC: 9.1 MG/DL (ref 8.6–10.5)
CHLORIDE SERPL-SCNC: 98 MMOL/L (ref 98–107)
CO2 SERPL-SCNC: 27 MMOL/L (ref 22–29)
CREAT SERPL-MCNC: 3.99 MG/DL (ref 0.76–1.27)
DEPRECATED RDW RBC AUTO: 40.6 FL (ref 37–54)
EGFRCR SERPLBLD CKD-EPI 2021: 15.5 ML/MIN/1.73
ERYTHROCYTE [DISTWIDTH] IN BLOOD BY AUTOMATED COUNT: 13.3 % (ref 12.3–15.4)
GLUCOSE BLDC GLUCOMTR-MCNC: 123 MG/DL (ref 70–130)
GLUCOSE BLDC GLUCOMTR-MCNC: 191 MG/DL (ref 70–130)
GLUCOSE BLDC GLUCOMTR-MCNC: 203 MG/DL (ref 70–130)
GLUCOSE SERPL-MCNC: 124 MG/DL (ref 65–99)
HCT VFR BLD AUTO: 30 % (ref 37.5–51)
HGB BLD-MCNC: 9.7 G/DL (ref 13–17.7)
MCH RBC QN AUTO: 27.6 PG (ref 26.6–33)
MCHC RBC AUTO-ENTMCNC: 32.3 G/DL (ref 31.5–35.7)
MCV RBC AUTO: 85.5 FL (ref 79–97)
PHOSPHATE SERPL-MCNC: 4.7 MG/DL (ref 2.5–4.5)
PLATELET # BLD AUTO: 257 10*3/MM3 (ref 140–450)
PMV BLD AUTO: 10.5 FL (ref 6–12)
POTASSIUM SERPL-SCNC: 4 MMOL/L (ref 3.5–5.2)
RBC # BLD AUTO: 3.51 10*6/MM3 (ref 4.14–5.8)
SODIUM SERPL-SCNC: 135 MMOL/L (ref 136–145)
WBC NRBC COR # BLD AUTO: 4.33 10*3/MM3 (ref 3.4–10.8)

## 2024-09-05 PROCEDURE — 94664 DEMO&/EVAL PT USE INHALER: CPT

## 2024-09-05 PROCEDURE — 5A1D70Z PERFORMANCE OF URINARY FILTRATION, INTERMITTENT, LESS THAN 6 HOURS PER DAY: ICD-10-PCS | Performed by: INTERNAL MEDICINE

## 2024-09-05 PROCEDURE — 94640 AIRWAY INHALATION TREATMENT: CPT

## 2024-09-05 PROCEDURE — 97116 GAIT TRAINING THERAPY: CPT

## 2024-09-05 PROCEDURE — 74230 X-RAY XM SWLNG FUNCJ C+: CPT

## 2024-09-05 PROCEDURE — 63710000001 INSULIN REGULAR HUMAN PER 5 UNITS: Performed by: HOSPITALIST

## 2024-09-05 PROCEDURE — 99232 SBSQ HOSP IP/OBS MODERATE 35: CPT

## 2024-09-05 PROCEDURE — 80069 RENAL FUNCTION PANEL: CPT | Performed by: INTERNAL MEDICINE

## 2024-09-05 PROCEDURE — 82948 REAGENT STRIP/BLOOD GLUCOSE: CPT

## 2024-09-05 PROCEDURE — 94799 UNLISTED PULMONARY SVC/PX: CPT

## 2024-09-05 PROCEDURE — 94760 N-INVAS EAR/PLS OXIMETRY 1: CPT

## 2024-09-05 PROCEDURE — 85027 COMPLETE CBC AUTOMATED: CPT | Performed by: HOSPITALIST

## 2024-09-05 PROCEDURE — 94761 N-INVAS EAR/PLS OXIMETRY MLT: CPT

## 2024-09-05 PROCEDURE — 92611 MOTION FLUOROSCOPY/SWALLOW: CPT

## 2024-09-05 RX ADMIN — INSULIN HUMAN 2 UNITS: 100 INJECTION, SOLUTION PARENTERAL at 18:34

## 2024-09-05 RX ADMIN — BARIUM SULFATE 50 ML: 400 SUSPENSION ORAL at 09:37

## 2024-09-05 RX ADMIN — APIXABAN 2.5 MG: 2.5 TABLET, FILM COATED ORAL at 21:17

## 2024-09-05 RX ADMIN — LEVOTHYROXINE SODIUM 75 MCG: 75 TABLET ORAL at 06:45

## 2024-09-05 RX ADMIN — BARIUM SULFATE 1 TEASPOON(S): 0.6 CREAM ORAL at 09:37

## 2024-09-05 RX ADMIN — ASPIRIN 325 MG: 325 TABLET ORAL at 11:50

## 2024-09-05 RX ADMIN — BARIUM SULFATE 135 ML: 980 POWDER, FOR SUSPENSION ORAL at 09:37

## 2024-09-05 RX ADMIN — APIXABAN 2.5 MG: 2.5 TABLET, FILM COATED ORAL at 11:51

## 2024-09-05 RX ADMIN — ATORVASTATIN CALCIUM 80 MG: 80 TABLET, FILM COATED ORAL at 21:17

## 2024-09-05 RX ADMIN — BUDESONIDE AND FORMOTEROL FUMARATE DIHYDRATE 2 PUFF: 160; 4.5 AEROSOL RESPIRATORY (INHALATION) at 19:35

## 2024-09-05 RX ADMIN — BUDESONIDE AND FORMOTEROL FUMARATE DIHYDRATE 2 PUFF: 160; 4.5 AEROSOL RESPIRATORY (INHALATION) at 07:18

## 2024-09-05 NOTE — PLAN OF CARE
Goal Outcome Evaluation:  Plan of Care Reviewed With: patient           Outcome Evaluation: Pt seen for PT this afternoon. Pt sat up to EOB with SBA and stood with min A x 1 and rolling walker. He AMB 40 ft with min A x 1 and rolling walker. He demonstrated decreased pace and slight unsteadiness but no loss of balance. Following AMB he returned to bed with SBA. PT will continue to follow up and progress as tolerated to prepare for d/c.      Anticipated Discharge Disposition (PT): skilled nursing facility

## 2024-09-05 NOTE — PLAN OF CARE
Goal Outcome Evaluation:      Patient denies chest pain nor discomfort, vital sign stable, pt still NPO , blood sugar was 68,  D50W  given, pt NIH was 0, educate to call for assist, call light in reach,  bed alarm intact ,  going for video swellow in am, cont to monitor.

## 2024-09-05 NOTE — PLAN OF CARE
Goal Outcome Evaluation:  Plan of Care Reviewed With: patient           Outcome Evaluation: Swallow completed.  Recommend nectar thick liquids, no straws and mechanical soft ground textures with no mixed consistencies.  Medications crushed with purée.  Recommend upright, slow rate, double swallow with all PO, periodic throat clear.  With increased O2 needs are negative along changes recommend NPO.  Moderate oropharyngeal dysphagia, generalized pharyngeal weakness resulting in moderate diffuse residue after the swallow.  Patient able to partially clear with multiple swallows and liquid wash. Silent aspiration of thin liquids observed on VFSS. SLP questions premorbid swallow function given history of CVAs.      Anticipated Discharge Disposition (SLP): unknown

## 2024-09-05 NOTE — CONSULTS
Nutrition Services    Patient Name: Carlito Mo  YOB: 1955  MRN: [id number]  Admission date: 9/3/2024    Comment:    Addition of Magic Cups at lunch/dinner (Provides 580 kcals, 18 g protein if consumed)   RD to complete NFPE when able.     CLINICAL NUTRITION ASSESSMENT      Reason for Assessment 9/5: BMI < 19      H&P      Past Medical History:   Diagnosis Date    Acquired hypothyroidism     Acute sinusitis     SYLVAIN (acute kidney injury)     on CKD    Anemia     Arthritis     Atrial fibrillation     CAD (coronary artery disease)     Chronic combined systolic and diastolic congestive heart failure     COPD (chronic obstructive pulmonary disease)     Depression     Diabetic neuropathy 04/11/2022    Diabetes mellitus due to underlying condition    Esophagitis 06/10/2020    Added automatically from request for surgery [id number]      ESRD (end stage renal disease) 12/01/2023    Frequent PVCs     Generalized weakness     GERD (gastroesophageal reflux disease)     Helicobacter pylori gastritis 06/04/2020    Hyperlipidemia     Hypertension     Hypertensive encephalopathy     Pleural effusion     Pneumonia     Poor historian     RBBB (right bundle branch block)     Stroke     POOR VISION    TIA (transient ischemic attack)     Type 2 diabetes mellitus     Urinary retention     Vitamin D deficiency        Past Surgical History:   Procedure Laterality Date    APPENDECTOMY N/A 02/14/2016    Dr. Alexey Dodson    ARTERIOVENOUS FISTULA/SHUNT SURGERY Right 06/03/2022    Procedure: RIGHT FOREARM ARTERIOVENOUS FISTULA PLACEMENT RADIOCEPHALIC WITH CEPHALIC VEIN TRANSPOSITION;  Surgeon: Eliseo Willis MD;  Location: [facility] OR;  Service: Vascular;  Laterality: Right;    COLONOSCOPY      over 20 years ago.  no polyps     COLONOSCOPY N/A 09/18/2018    Procedure: COLONOSCOPY;  Surgeon: Jose Enrique Louie MD;  Location: [facility] ENDOSCOPY;  Service: Gastroenterology    COLONOSCOPY N/A 09/19/2018    IH, EH, polyps (TAs  w/low grade dysplasia)    COLONOSCOPY N/A 06/01/2020    Procedure: COLONOSCOPY;  Surgeon: Jose Enrique Louie MD;  Location:  [facility];  Service: Gastroenterology;  Laterality: N/A;  Pre op-Anemia, Melena, History of Polyps  Post op-To Cecum/TI, Polyp, Poor Prep    CORONARY ARTERY BYPASS GRAFT  11/2001    ENDOSCOPY N/A 05/29/2020    Procedure: ESOPHAGOGASTRODUODENOSCOPY with biopsies;  Surgeon: Amanda Lovelace MD;  Location:  [facility];  Service: Gastroenterology;  Laterality: N/A;  pre-anemia, dark stools  post-esophagitis, hiatal hernia    ENDOSCOPY N/A 09/15/2020    Procedure: ESOPHAGOGASTRODUODENOSCOPY WITH BIOPSIES;  Surgeon: Jose Enrique Louie MD;  Location:  [facility];  Service: Gastroenterology;  Laterality: N/A;  pre: history of melena and esophagitis  post: mild esophagitis and gastritis, small hiatal hernia    ENDOSCOPY N/A 12/4/2023    Procedure: ESOPHAGOGASTRODUODENOSCOPY with bx;  Surgeon: Quoc Elmore MD;  Location:  [facility];  Service: Gastroenterology;  Laterality: N/A;  pre: Coffee-ground emesis  post: hiatal hernia, esophagitis    HERNIA REPAIR Left 12/05/2019    THORACENTESIS      TONSILLECTOMY      VASECTOMY          Current Problems   Acute cerebellar stroke with hx of multiple prior strokes  -likely related noncompliance with medical therapy  -neurology following    Dysphagia  -SLP following  -mechanically altered diet in place    DM  CHF    ESRD on HD  -nephrology following        Encounter Information        Trending Narrative     9/5: Pt admitted to [facility] with complaints of dizziness following HD. Pt reported that it felt like his coordination was a bit off. Pt reports he forgets take is Eliquis + additional medications occasionally. S/p MRI. Admitting dx of acute cerebellar stroke. Hx of multiple prior strokes. SLP assessed pt 9/5 recommending mechanical ground textures, nectar thickened liquids without straws, no mixed consistencies. Pt out of room at  "time of RD visit. Unable to complete interview/NFPE this date.      Anthropometrics        Current Height, Weight Height: 182.9 cm (72\")  Weight: 60.2 kg (132 lb 11.2 oz) (09/05/24 0630)       Usual Body Weight (UBW) Unknown        Trending Weight Hx     This admission: 9/5: 132# - standing scale             PTA: 4.3% wt loss x 3 months    Wt Readings from Last 30 Encounters:   09/05/24 0630 60.2 kg (132 lb 11.2 oz)   09/04/24 1041 58.1 kg (128 lb)   09/04/24 0225 58.4 kg (128 lb 12 oz)   09/03/24 2022 58 kg (127 lb 14.4 oz)   05/29/24 0515 62.8 kg (138 lb 7.2 oz)   05/28/24 0430 64.4 kg (141 lb 15.6 oz)   05/27/24 0506 65.9 kg (145 lb 4.5 oz)   05/26/24 0520 64.6 kg (142 lb 6.7 oz)   05/24/24 1200 63.7 kg (140 lb 6.9 oz)   05/23/24 1937 64.4 kg (141 lb 15.6 oz)   04/16/24 0936 64.4 kg (142 lb)   01/11/24 0630 64.7 kg (142 lb 11.2 oz)   01/10/24 0424 65.5 kg (144 lb 6.4 oz)   01/09/24 0728 64.4 kg (142 lb)   01/09/24 0513 64.7 kg (142 lb 9.6 oz)   01/08/24 0611 65.5 kg (144 lb 6.4 oz)   01/07/24 0451 63.7 kg (140 lb 6.4 oz)   01/06/24 0634 64.8 kg (142 lb 13.7 oz)   01/05/24 0536 63.8 kg (140 lb 10.5 oz)   01/04/24 0700 68 kg (149 lb 14.6 oz)   01/03/24 2250 64.9 kg (143 lb)   12/10/23 0500 65.1 kg (143 lb 8.3 oz)   12/09/23 1149 65.3 kg (143 lb 15.4 oz)   12/09/23 0615 64.3 kg (141 lb 12.1 oz)   12/08/23 0543 65.8 kg (145 lb 1 oz)   12/07/23 0508 66.2 kg (145 lb 15.1 oz)   12/06/23 0507 68.4 kg (150 lb 12.7 oz)   12/05/23 0716 65.7 kg (144 lb 14.4 oz)   12/05/23 0600 66.6 kg (146 lb 13.2 oz)   12/04/23 0526 69.1 kg (152 lb 5.4 oz)   12/02/23 0536 67.2 kg (148 lb 2.4 oz)   12/01/23 0627 64.5 kg (142 lb 3.2 oz)   11/30/23 0545 67 kg (147 lb 11.3 oz)   11/29/23 0340 61.3 kg (135 lb 2.3 oz)   11/23/23 1441 63.1 kg (139 lb 1.6 oz)   06/22/23 1053 66.2 kg (146 lb)   05/30/23 1339 66.2 kg (146 lb)   05/05/23 0505 78.6 kg (173 lb 3.2 oz)   05/04/23 0613 78.4 kg (172 lb 14.4 oz)   05/03/23 0553 77.7 kg (171 lb 4.8 oz) "   05/02/23 1149 77.8 kg (171 lb 8.3 oz)   05/02/23 0602 81.6 kg (180 lb)   04/14/23 1307 81.6 kg (180 lb)   03/28/23 1148 73.9 kg (163 lb)   03/10/23 0600 71.6 kg (157 lb 12.8 oz)   03/09/23 0550 70 kg (154 lb 4.8 oz)   03/08/23 0954 73 kg (161 lb)   03/08/23 0552 73.3 kg (161 lb 9.6 oz)   03/07/23 1302 74.6 kg (164 lb 7.4 oz)   03/06/23 2020 81.6 kg (180 lb)   10/05/22 1412 71.2 kg (157 lb)   09/18/22 1420 74.4 kg (164 lb)   09/16/22 0500 74.6 kg (164 lb 7.4 oz)   09/16/22 0458 74.6 kg (164 lb 7.4 oz)   07/29/22 0615 74.6 kg (164 lb 8 oz)   07/28/22 0630 75 kg (165 lb 4.8 oz)   07/27/22 0701 76.9 kg (169 lb 8 oz)   07/27/22 0452 77.4 kg (170 lb 11.2 oz)   07/26/22 0809 79.5 kg (175 lb 3.2 oz)   07/26/22 0603 81.5 kg (179 lb 9.6 oz)   07/25/22 0553 83.4 kg (183 lb 14.4 oz)   07/24/22 2007 84.1 kg (185 lb 8 oz)   06/29/22 1422 84.4 kg (186 lb)   06/15/22 1357 80.7 kg (178 lb)   06/10/22 1542 79.3 kg (174 lb 12.8 oz)   06/08/22 1355 77.6 kg (171 lb)   06/04/22 1451 78.8 kg (173 lb 11.6 oz)   06/03/22 0852 80.9 kg (178 lb 6.4 oz)   06/01/22 1934 81.6 kg (180 lb)   05/20/22 0633 79.7 kg (175 lb 11.3 oz)   05/18/22 1342 79.1 kg (174 lb 6.4 oz)   03/08/22 0505 78.2 kg (172 lb 4.8 oz)   03/07/22 0554 78.9 kg (173 lb 14.4 oz)   03/06/22 0228 78.8 kg (173 lb 11.2 oz)   03/05/22 0535 78.3 kg (172 lb 9.6 oz)   03/04/22 0530 75.8 kg (167 lb 1.6 oz)   03/03/22 0602 76 kg (167 lb 8.8 oz)   03/02/22 0512 74.9 kg (165 lb 2 oz)   03/01/22 0647 74.8 kg (165 lb)   02/08/22 1307 77.6 kg (171 lb)   02/03/22 1333 81.6 kg (180 lb)   02/02/22 1949 81.6 kg (180 lb)   02/01/22 1917 81.6 kg (180 lb)   12/28/21 1120 83 kg (183 lb)   12/22/21 0510 85.4 kg (188 lb 3.2 oz)   12/21/21 0555 85.5 kg (188 lb 6.4 oz)   12/20/21 0519 84.4 kg (186 lb)   12/19/21 0519 84.4 kg (186 lb 1.6 oz)   12/18/21 0522 85.1 kg (187 lb 9.6 oz)   12/17/21 0422 85.7 kg (189 lb)   12/16/21 0348 84.6 kg (186 lb 8 oz)   12/15/21 0525 85.3 kg (188 lb 1.6 oz)   12/13/21  2116 86.2 kg (190 lb)   09/29/21 0913 78 kg (172 lb)      BMI kg/m2 Body mass index is 18 kg/m².       Labs        Pertinent Labs    Results from last 7 days   Lab Units 09/05/24 0420 09/04/24 0416 09/03/24 2010   SODIUM mmol/L 135* 137 138   POTASSIUM mmol/L 4.0 4.0 3.9   CHLORIDE mmol/L 98 97* 97*   CO2 mmol/L 27.0 28.0 29.3*   BUN mg/dL 51* 38* 26*   CREATININE mg/dL 3.99* 3.13* 2.48*   CALCIUM mg/dL 9.1 9.1 9.3   BILIRUBIN mg/dL  --  0.2 0.2   ALK PHOS U/L  --  84 86   ALT (SGPT) U/L  --  11 15   AST (SGOT) U/L  --  11 15   GLUCOSE mg/dL 124* 185* 232*     Results from last 7 days   Lab Units 09/05/24 0421 09/05/24 0420 09/04/24 0416   PHOSPHORUS mg/dL  --  4.7*  --    HEMOGLOBIN g/dL 9.7*  --  10.3*   HEMATOCRIT % 30.0*  --  31.4*   TRIGLYCERIDES mg/dL  --   --  96     Lab Results   Component Value Date    HGBA1C 7.40 (H) 09/04/2024        Medications    Scheduled Medications apixaban, 2.5 mg, Oral, Q12H  aspirin, 325 mg, Oral, Daily   Or  aspirin, 300 mg, Rectal, Daily  atorvastatin, 80 mg, Oral, Nightly  budesonide-formoterol, 2 puff, Inhalation, BID - RT  insulin regular, 2-7 Units, Subcutaneous, Q6H  levothyroxine, 75 mcg, Oral, Q AM        Infusions      PRN Medications   acetaminophen **OR** acetaminophen    bisacodyl    dextrose    dextrose    glucagon (human recombinant)    labetalol    ondansetron     Physical Findings        Trending Physical   Appearance, NFPE 9/5 SAEID   --  Edema  1+ generalized     Bowel Function + BM on 9/5     Tubes None      Chewing/Swallowing Mechanical ground, nectar thickened liquids, no straws, no mixed consistencies, SLP following      Skin Intact      --  Current Nutrition Orders & Evaluation of Intake       Oral Nutrition     Food Allergies NKFA   Current PO Diet Diet: Regular/House; No Mixed Consistencies; Texture: Mechanical Ground (NDD 2); Fluid Consistency: Nectar Thick   Supplement -   PO Evaluation     Trending % PO Intake 9/5: no intake recorded since diet  initiated    --  Nutritional Risk Screening        NRS-2002 Score          Nutrition Diagnosis         Nutrition Dx Problem 1 Underweight related to PMH as evidenced by BMI < 18.5.       Nutrition Dx Problem 2        Intervention Goal         Intervention Goal(s) PO intake > 75%  Wt maintenance/trend toward IBW     Nutrition Intervention        RD Action Addition of Magic Cups at lunch/dinner (Provides 580 kcals, 18 g protein if consumed)        Nutrition Prescription          Diet Prescription Mechanical ground, nectar thickened liquids   Supplement Prescription Magic Cups at lunch/dinner (Provides 580 kcals, 18 g protein if consumed)      --  Monitor/Evaluation        Monitor Per protocol, PO intake, Supplement intake, Pertinent labs, Weight, Swallow function       Electronically signed by:  Kristin Galicia RD  09/05/24 12:42 EDT

## 2024-09-05 NOTE — PROGRESS NOTES
[facility] Progress Note      Patient Name: Carlito Mo  : 1955  MRN: [id number]  Primary Care Physician:  B[place] Simons APRN  Date of admission: 9/3/2024    Subjective     Interval History:   Follow-up ESRD.  Video swallow study this morning .  Now on modified diet for moderate oropharyngeal dysphagia, generalized pharyngeal weakness.  Feels tired.  Denies pain.  Still having frequent stools.  Review of Systems:   As noted above    Objective     Vitals:   Temp:  [97.6 °F (36.4 °C)-98.2 °F (36.8 °C)] 98.2 °F (36.8 °C)  Heart Rate:  [58-78] 71  Resp:  [18] 18  BP: (120-149)/(65-71) 149/71    Intake/Output Summary (Last 24 hours) at 2024 1505  Last data filed at 2024 1300  Gross per 24 hour   Intake 240 ml   Output 300 ml   Net -60 ml       Physical Exam:    General Appearance: Chronically ill.  On dialysis.  Sleeping but awakens easily and answers appropriately.  Skin: warm and dry  HEENT: oral mucosa normal, nonicteric sclera  Neck: supple, no JVD  Lungs: Clear to auscultation bilaterally.  Heart: Irregularly irregular.  Abdomen: soft, nontender, nondistended. +bs  : no palpable bladder  Extremities: Right upper extremity AV fistula patent.      Scheduled Meds:     apixaban, 2.5 mg, Oral, Q12H  aspirin, 325 mg, Oral, Daily   Or  aspirin, 300 mg, Rectal, Daily  atorvastatin, 80 mg, Oral, Nightly  budesonide-formoterol, 2 puff, Inhalation, BID - RT  insulin regular, 2-7 Units, Subcutaneous, Q6H  levothyroxine, 75 mcg, Oral, Q AM      IV Meds:        Results Reviewed:   I have personally reviewed the results from the time of this admission to 2024 15:05 EDT     Results from last 7 days   Lab Units 24  0420 24  0416 24   SODIUM mmol/L 135* 137 138   POTASSIUM mmol/L 4.0 4.0 3.9   CHLORIDE mmol/L 98 97* 97*   CO2 mmol/L 27.0 28.0 29.3*   BUN mg/dL 51* 38* 26*   CREATININE mg/dL 3.99* 3.13* 2.48*   CALCIUM mg/dL 9.1 9.1 9.3   BILIRUBIN mg/dL   --  0.2 0.2   ALK PHOS U/L  --  84 86   ALT (SGPT) U/L  --  11 15   AST (SGOT) U/L  --  11 15   GLUCOSE mg/dL 124* 185* 232*       Estimated Creatinine Clearance: 14.9 mL/min (A) (by C-G formula based on SCr of 3.99 mg/dL (H)).    Results from last 7 days   Lab Units 09/05/24  0420   PHOSPHORUS mg/dL 4.7*             Results from last 7 days   Lab Units 09/05/24  0421 09/04/24  0416 09/03/24 2010   WBC 10*3/mm3 4.33 5.96 6.67   HEMOGLOBIN g/dL 9.7* 10.3* 11.5*   PLATELETS 10*3/mm3 257 259 293             Assessment / Plan     ASSESSMENT:  ESRD.  Dialysis Tuesday Thursday Saturday.  Hemodialysis today.  Acute ischemic left cerebellar CVA.  Likely due to paroxysmal atrial fibrillation and noncompliance with Eliquis.  CTA head and neck noted with severe narrowing at the origin of the left vertebral artery and moderate stenosis of the origin of the right vertebral artery.  40-50% stenosis of the proximal right internal carotid artery.  Recurrent strokes.  Not compliant with prescribed Eliquis, aspirin and statin.  Video swallow this morning.  On modified diet.  Paroxysmal atrial fibrillation  Diabetes mellitus type 2  Hypothyroid on replacement  Chronic combined heart failure .  EF on echocardiogram 44%.  Hypertension, chronic kidney disease.  Permissive hypertension in the next 24 to 48 hours.  Anemia of chronic kidney disease  10.  Loose stools.  Will monitor in the hospital.    PLAN:  Hemodialysis today.    Thank you for involving us in the care of Carlito Mo.  Please feel free to call with any questions.    Zahida Hsu MD  09/05/24  15:05 EDT    [facility]  [phone]    Please note that portions of this note were completed with a voice recognition program.

## 2024-09-05 NOTE — PROGRESS NOTES
Name: Carlito Mo ADMIT: 9/3/2024   : 1955  PCP: Belle Simnos APRN    MRN: [id number] LOS: 2 days   AGE/SEX: 69 y.o. male  ROOM: [facility]     Subjective   No acute complaints overnight. Resting comfortably in bed.     Objective   Objective   Vital Signs  Temp:  [97.5 °F (36.4 °C)-98.1 °F (36.7 °C)] 98 °F (36.7 °C)  Heart Rate:  [58-78] 64  Resp:  [18] 18  BP: (120-147)/(65-78) 135/69  SpO2:  [95 %-100 %] 100 %  on   ;   Device (Oxygen Therapy): room air  Body mass index is 18 kg/m².  Physical Exam  Constitutional:       General: He is not in acute distress.  HENT:      Head: Normocephalic and atraumatic.   Cardiovascular:      Rate and Rhythm: Normal rate. Rhythm irregular.   Pulmonary:      Effort: Pulmonary effort is normal. No respiratory distress.   Abdominal:      General: There is no distension.      Palpations: Abdomen is soft.      Tenderness: There is no abdominal tenderness.   Neurological:      Mental Status: He is alert and oriented to person, place, and time.      Comments: Word finding difficulties          Results Review     I reviewed the patient's new clinical results.  Results from last 7 days   Lab Units 24   WBC 10*3/mm3 4.33 5.96 6.67   HEMOGLOBIN g/dL 9.7* 10.3* 11.5*   PLATELETS 10*3/mm3 257 259 293     Results from last 7 days   Lab Units 24  04224   SODIUM mmol/L 135* 137 138   POTASSIUM mmol/L 4.0 4.0 3.9   CHLORIDE mmol/L 98 97* 97*   CO2 mmol/L 27.0 28.0 29.3*   BUN mg/dL 51* 38* 26*   CREATININE mg/dL 3.99* 3.13* 2.48*   GLUCOSE mg/dL 124* 185* 232*   Estimated Creatinine Clearance: 14.9 mL/min (A) (by C-G formula based on SCr of 3.99 mg/dL (H)).  Results from last 7 days   Lab Units 24  0420 24  0416 24   ALBUMIN g/dL 3.9 4.1 4.5   BILIRUBIN mg/dL  --  0.2 0.2   ALK PHOS U/L  --  84 86   AST (SGOT) U/L  --  11 15   ALT (SGPT) U/L  --  11 15     Results from last 7 days    Lab Units 09/05/24  0420 09/04/24  0416 09/03/24 2010   CALCIUM mg/dL 9.1 9.1 9.3   ALBUMIN g/dL 3.9 4.1 4.5   PHOSPHORUS mg/dL 4.7*  --   --        COVID19   Date Value Ref Range Status   05/23/2024 Not Detected Not Detected - Ref. Range Final   01/04/2024 Not Detected Not Detected - Ref. Range Final     Hemoglobin A1C   Date/Time Value Ref Range Status   09/04/2024 0416 7.40 (H) 4.80 - 5.60 % Final     Glucose   Date/Time Value Ref Range Status   09/05/2024 1113 203 (H) 70 - 130 mg/dL Final   09/05/2024 0619 123 70 - 130 mg/dL Final   09/04/2024 2246 82 70 - 130 mg/dL Final   09/04/2024 2044 68 (L) 70 - 130 mg/dL Final   09/04/2024 1556 185 (H) 70 - 130 mg/dL Final   09/04/2024 1218 164 (H) 70 - 130 mg/dL Final   09/04/2024 0552 153 (H) 70 - 130 mg/dL Final     Results for orders placed or performed during the hospital encounter of 09/19/18   Blood Culture - Blood,    Specimen: Hand, Left; Blood   Result Value Ref Range    Blood Culture No growth at 5 days          CT Head Without Contrast  Narrative: EMERGENCY CT SCAN OF THE HEAD WITHOUT CONTRAST 09/04/2024     CLINICAL HISTORY: Ataxia and dizziness.     TECHNIQUE: Spiral CT images were obtained from the base of the skull to  the vertex without intravenous contrast. The images were reformatted and  are submitted in 3 mm thick axial, sagittal and coronal CT sections with  brain algorithm.     This is correlated to a prior head CT from [facility] on  01/04/2024 and a prior MRI of the brain on 09/17/2022.     FINDINGS: There are extensive patchy and confluent areas of low-density  throughout the cerebral white matter consistent with severe small vessel  disease. There is a chronic area of encephalomalacia in the superior  left parietal cortex measuring 2.5 x 1.8 cm in size compatible with an  old superior left parietal cortical infarct, unchanged dating back to  MRI of the brain 09/17/2022. It is in the left MCA territory.  Furthermore there is a  2.7 x 1.3 cm chronic infarct in the superomedial  right cerebellum in the right superior cerebellar artery territory and  there is a 18 x 5 mm chronic infarct in the posterolateral left  cerebellum in the left PICA territory, unchanged since MRI of the brain  09/17/2022. There are tiny old lacunar infarcts in the thalami  bilaterally unchanged. There is diffuse cerebral atrophy. The ventricles  are normal in size. I see no mass effect and no midline shift and no  extra-axial fluid collections are identified and there is no evidence of  acute intracranial hemorrhage. There is a 2.5 cm mucous retention cyst  in the posterior right maxillary sinus. The remainder of the paranasal  sinuses, mastoid air cells and middle ear cavities are clear. The  calvarium and skull base are normal in appearance. There is a chronic  sebaceous cyst in the posterior superior left parietal scalp measuring  18 x 14 mm in size.     Impression: 1. No convincing acute intracranial abnormality is identified with no  discernible change when compared to prior MRI of the brain on  09/17/2022.     2. There is severe small vessel disease in the cerebral white matter.  There is a 2.5 x 1.8 cm old superior left parietal infarct in the left  MCA territory. There is a 2.7 x 1.3 cm old superomedial right cerebellar  infarct in the right superior cerebellar artery territory and there is  an 18 x 5 mm old posterolateral left cerebellar infarct in the left PICA  territory and there are small old lacunar infarcts in the thalami  bilaterally. There is diffuse cerebral atrophy.     3. There is a 2.5 cm mucous retention cyst in the posterior right  maxillary sinus and there is a sebaceous cyst in the posterior superior  left parietal scalp measuring 18 x 14 x 19 mm in size. The remainder of  the head CT is normal. The etiology of the ataxia and dizziness is not  established on this exam and if there remains any clinical suspicion of  an acute infarct, I  recommend an MRI of the brain for a more complete  assessment. The findings and recommendations were communicated to  Eliseo Johnson MD, by telephone on 09/04/2024 at 8:20 a.m.     Radiation dose reduction techniques were utilized, including automated  exposure control and exposure modulation based on body size.        This report was finalized on 9/5/2024 6:53 AM by Dr. Alexey Aldana M.D on  Workstation: [id number]     MRI Brain Without Contrast  Narrative: STAT MRI OF THE BRAIN WITHOUT CONTRAST ON 09/04/2024     CLINICAL HISTORY: This is a 69-year-old male patient who has a prior  history of stroke and has acute dizziness after dialysis on 09/03/2024,  evaluate for acute stroke.     TECHNIQUE: Axial T1, FLAIR, fat-suppressed T2, axial diffusion and  gradient echo T2 and sagittal T1-weighted images were obtained of the  entire head.     This is correlated to a prior MRI of the brain from [facility] on 09/17/2022.     FINDINGS: There is extensive patchy and confluent T2 high signal  throughout the periventricular and subcortical white matter of the  cerebral hemispheres compatible with severe small vessel disease. There  are tiny old lacunar infarcts in the mid right corona radiata region and  in the thalami bilaterally. There is a 2.5 cm old superior parietal  infarct in the left MCA territory. There is a 2.4 x 1.1 cm old superior  medial right cerebellar infarct and an additional 1.8 x 0.5 cm old  superior right vermian cerebellar infarct in the right superior  cerebellar artery territory. There is a 7 x 2 mm old posterior inferior  lateral right cerebellar infarct in the right PICA territory and there  is a 14 x 4 mm old posterior inferior lateral left cerebellar infarct in  the left PICA territory, and these are all unchanged since prior MRI  09/17/2022. There is an ovoid area of intense increased signal intensity  on the diffusion weighted images projecting over the mid left  cerebellar  white matter that measures 5 mm in size. It is a little dark on the ADC  maps and bright on the FLAIR images and is compatible with a tiny acute  mid left cerebellar white matter infarct. There is mild diffuse cerebral  atrophy.  The lateral and third ventricles are mildly prominent in size,  felt to be on the basis of central volume loss or atrophy. I see no mass  effect and no midline shift. No extra-axial fluid collections are  identified. Calvarium and skull base are normal in appearance. There is  a 2.5 cm mucous retention cyst in the posterior right maxillary sinus.  Otherwise, the paranasal sinuses and the mastoid air cells and the  middle ear cavities are clear. There are bilateral intraocular lens  implants in the globes from previous cataract surgery, otherwise the  orbits are normal in appearance. There is a tiny punctate 2 mm focus of  signal loss on the gradient echo T2 weighted images in the right lateral  malcolm, likely a punctate area of hemosiderin deposition due to an old  hypertensive microhemorrhage and there is a 4 mm rounded focus of signal  loss in the inferior medial left cerebellum on the gradient echo T2  weighted images compatible with an additional focus of hemosiderin  deposition probably secondary to old hypertensive microhemorrhages.   There is a cluster of nodular foci of signal loss in the right and left  thalami compatible with multiple punctate old microhemorrhages in the  thalami bilaterally likely secondary to hypertension.  There is a 5 mm  rounded focus of signal loss in the superior lateral right occipital  juxtacortical white matter compatible with a tiny old microhemorrhage at  this site and given this location it could be secondary to amyloid.      Impression: 1. Since the prior MRI of the brain on 09/17/2022 there has been  development of a 5 mm rounded diffusion hyperintense focus in the mid  left cerebellum.  This is most compatible with a tiny acute mid  left  cerebellar white matter infarct. Otherwise, there has been no  significant change.     2. There is severe small vessel disease in the cerebral white matter.  There are old lacunar infarcts in the mid right corona radiata region  and the thalami bilaterally. There are multiple old bilateral cerebellar  infarcts including a 2.4 x 1.1 cm old superior medial right cerebellar  infarct and an additional 1.8 x 0.5 cm old superior right cerebellar  vermian infarct and these are both in the right superior cerebellar  artery territory. There is an additional 7 x 2 mm old posterior inferior  lateral right cerebellar infarct in the right PICA territory and there  is a 14 x 4 mm old posterior inferior lateral left cerebellar infarct in  the left PICA territory.     3. There are multiple punctate foci of hemosiderin deposition from tiny  old microhemorrhages with 4 in the left thalamus and 4 in the right  thalamus, and a 2 mm focus in the right side of the malcolm and these are  good locations for old hypertensive microhemorrhages. There is a 4 mm  focus in the inferior medial left cerebellum and a 4-5 mm rounded focus  in the superior lateral right occipital juxtacortical white matter that  could be secondary to amyloid. There is diffuse cerebral atrophy. The  remainder of the MRI of the brain is normal. I communicated the results  to Eliseo Card MD, by telephone on 09/04/2024 at 11:50 a.m.     This report was finalized on 9/5/2024 6:37 AM by Dr. Alexey Aldana M.D on  Workstation: [id number]     CT Angiogram Head, CT Angiogram Neck  Narrative: NONCONTRAST CRANIAL CT SCAN, CT ANGIOGRAM NECK, CT ANGIOGRAM HEAD.     HISTORY: Acute CVA,      COMPARISON: None..     TECHNIQUE:  Radiation dose reduction techniques were utilized, including  automated exposure control and exposure modulation based on body size.   Initially, a routine noncontrast cranial CT performed from the skull  base through the vertex region. After  review, thin-section contrast  enhanced CT angiogram images obtained from the aortic arch through the  calvarial vertex utilizing angiographic technique. Multi projection 3-D  MIP reformatted images were supplemented and reviewed.        FINDINGS CRANIAL CT: No acute hemorrhage or midline shift is  demonstrated.. The patient has advanced atrophy for the age of 69. There  is periventricular and deep white matter microangiopathic change. Old  lacunar infarct is noted within the left thalamus. Postcontrast imaging  does not show any evidence of venous occlusion. No abnormal enhancement  is seen. Bone window images demonstrate a large mucous retention cyst  within the right maxillary sinus. Mucosal thickening is noted within the  ethmoid sinuses..     CERVICAL CAROTID CT ANGIOGRAM:     FINDINGS: There is normal arch anatomy. There is atherosclerotic  involvement of the aortic arch. Common carotid arteries are patent.  Calcified plaque is noted at the carotid bifurcations bilaterally.  Distal cervical internal carotid arteries are patent. Calcified plaque  is noted at the origins of the vertebral arteries bilaterally. The left  is slightly larger in caliber. There is severe stenosis at the origin of  the left vertebral artery. There is moderate narrowing at the origin of  the right vertebral artery. There is approximately 40 to 50% stenosis of  the proximal right internal carotid artery. Images through the lung  apices do not demonstrate any acute abnormalities. Thyroid gland and  trachea are within normal limits.        NASCET criteria utilized in stenosis measurements. stenosis mild, 0-49%.        CRANIAL CTA ANGIOGRAM:     FINDINGS: Intracranial vertebral arteries are patent. Basilar artery is  also patent. Both P1 segments are very hypoplastic. There are  well-developed posterior communicating arteries bilaterally. Posterior  cerebral arteries are patent. Intracranial internal carotid arteries are  patent. There is  an anterior communicating artery. Right A1 is somewhat  hypoplastic. The middle cerebral and anterior cerebral arteries are  patent.             Radiation dose reduction techniques were utilized, including automated  exposure control and exposure modulation based on body size.        Impression: 1. 1. No acute intracranial findings.  2. Severe narrowing at the origin of the left vertebral artery, and  moderate stenosis of the origin of the right vertebral artery.  3. Approximately 40 to 50% stenosis of the proximal right internal  carotid artery.  4. No intracranial stenosis or occlusion.        Radiation dose reduction techniques were utilized, including automated  exposure control and exposure modulation based on body size.        This report was finalized on 9/5/2024 4:34 AM by Dr. Alice Allen M.D on Workstation: [id number]       Scheduled Medications  apixaban, 2.5 mg, Oral, Q12H  aspirin, 325 mg, Oral, Daily   Or  aspirin, 300 mg, Rectal, Daily  atorvastatin, 80 mg, Oral, Nightly  budesonide-formoterol, 2 puff, Inhalation, BID - RT  insulin regular, 2-7 Units, Subcutaneous, Q6H  levothyroxine, 75 mcg, Oral, Q AM    Infusions   Diet  Diet: Regular/House; No Mixed Consistencies; Texture: Mechanical Ground (NDD 2); Fluid Consistency: Nectar Thick       Assessment/Plan     Active Hospital Problems    Diagnosis  POA   • **Cerebellar stroke, acute [I63.9]  Yes   • Medically noncompliant [Z91.199]  Not Applicable   • Ataxia [R27.0]  Yes   • Chronic HFrEF (heart failure with reduced ejection fraction) [I50.22]  Yes   • ESRD (end stage renal disease) [N18.6]  Yes   • Paroxysmal atrial fibrillation [I48.0]  Yes   • Patent foramen ovale [Q21.12]  Not Applicable   • Anemia, chronic disease [D63.8]  Yes   • Essential hypertension [I10]  Yes   • Acquired hypothyroidism [E03.9]  Yes   • Type 2 diabetes mellitus, with long-term current use of insulin [E11.9, Z79.4]  Not Applicable      Resolved Hospital Problems   No  resolved problems to display.       69 y.o. male admitted with Cerebellar stroke, acute.    Acute cerebral stroke  CT angiogram of the head and neck did not show any acute intracranial findings however severe narrowing at the origin of the left vertebral artery and moderate stenosis of the origin of the right vertebral artery were noted.  Aspirin, statin  Permissive hypertension.  Treat BP only if over 220/120  PT/OT/SLP    Paroxysmal atrial fibrillation  Patent Foramen ovale  Eliquis 5 mg twice daily    T2DM  SSI    Hypertension  Allow for permissive hypertension today    Hypothyroidism  On levothyroxine      Eliquis (home med) for DVT prophylaxis.  Full code.  Discussed with patient.  Anticipate discharge home in 2-3 days.      Clark Gonzalez MD  [facility]  09/05/24  13:18 EDT

## 2024-09-05 NOTE — MBS/VFSS/FEES
Acute Care - Speech Language Pathology   Swallow Initial Evaluation [facility]     Patient Name: Carlito Mo  : 1955  MRN: [id number]  Today's Date: 2024               Admit Date: 9/3/2024    Visit Dx:     ICD-10-CM ICD-9-CM   1. Ataxia  R27.0 781.3     Patient Active Problem List   Diagnosis    Major depressive disorder with single episode, in partial remission    Other hyperlipidemia    Type 2 diabetes mellitus, with long-term current use of insulin    Vitamin D deficiency    Erectile dysfunction    Chronic fatigue    Type 2 diabetes mellitus with ophthalmic complication    Benign prostatic hyperplasia with nocturia    History of basal cell carcinoma    Acquired hypothyroidism    Recurrent strokes    Suspected sleep apnea    YAW (obstructive sleep apnea)    Coronary artery disease involving native coronary artery of native heart without angina pectoris    Chronically elevated troponin    Colon cancer screening    Enlarged lymph nodes    Functional gait abnormality    History of myocardial infarction    Insomnia    Iron deficiency anemia    Major depression, recurrent    Essential hypertension    Acute on chronic renal insufficiency    Falls frequently    Acute on chronic anemia    Neurogenic orthostatic hypotension    Anemia, chronic disease    History of colon polyps    Helicobacter pylori gastritis    Esophagitis    Embolic stroke    Patent foramen ovale    Cardiomyopathy    Diarrhea    Generalized weakness    Paroxysmal atrial fibrillation    Chronic anticoagulation    Acute ischemic stroke    History of stroke    Iron deficiency anemia    Acute HFrEF (heart failure with reduced ejection fraction)    ESRD (end stage renal disease)    Hemoptysis    Chronic HFrEF (heart failure with reduced ejection fraction)    Hemodialysis patient    Exertional dyspnea    Severe malnutrition    Ataxia    Cerebellar stroke, acute    Medically noncompliant     Past Medical History:   Diagnosis Date    Acquired  hypothyroidism     Acute sinusitis     SYLVAIN (acute kidney injury)     on CKD    Anemia     Arthritis     Atrial fibrillation     CAD (coronary artery disease)     Chronic combined systolic and diastolic congestive heart failure     COPD (chronic obstructive pulmonary disease)     Depression     Diabetic neuropathy 04/11/2022    Diabetes mellitus due to underlying condition    Esophagitis 06/10/2020    Added automatically from request for surgery [id number]      ESRD (end stage renal disease) 12/01/2023    Frequent PVCs     Generalized weakness     GERD (gastroesophageal reflux disease)     Helicobacter pylori gastritis 06/04/2020    Hyperlipidemia     Hypertension     Hypertensive encephalopathy     Pleural effusion     Pneumonia     Poor historian     RBBB (right bundle branch block)     Stroke     POOR VISION    TIA (transient ischemic attack)     Type 2 diabetes mellitus     Urinary retention     Vitamin D deficiency      Past Surgical History:   Procedure Laterality Date    APPENDECTOMY N/A 02/14/2016    Dr. Alexey Dodson    ARTERIOVENOUS FISTULA/SHUNT SURGERY Right 06/03/2022    Procedure: RIGHT FOREARM ARTERIOVENOUS FISTULA PLACEMENT RADIOCEPHALIC WITH CEPHALIC VEIN TRANSPOSITION;  Surgeon: Eliseo Willis MD;  Location: [facility] MAIN OR;  Service: Vascular;  Laterality: Right;    COLONOSCOPY      over 20 years ago.  no polyps     COLONOSCOPY N/A 09/18/2018    Procedure: COLONOSCOPY;  Surgeon: Jose Enrique Louie MD;  Location: [facility] ENDOSCOPY;  Service: Gastroenterology    COLONOSCOPY N/A 09/19/2018    IH, EH, polyps (TAs w/low grade dysplasia)    COLONOSCOPY N/A 06/01/2020    Procedure: COLONOSCOPY;  Surgeon: Jose Enrique Louie MD;  Location: [facility] ENDOSCOPY;  Service: Gastroenterology;  Laterality: N/A;  Pre op-Anemia, Melena, History of Polyps  Post op-To Cecum/TI, Polyp, Poor Prep    CORONARY ARTERY BYPASS GRAFT  11/2001    ENDOSCOPY N/A 05/29/2020    Procedure: ESOPHAGOGASTRODUODENOSCOPY with  biopsies;  Surgeon: Amanda Lovelace MD;  Location:  [facility];  Service: Gastroenterology;  Laterality: N/A;  pre-anemia, dark stools  post-esophagitis, hiatal hernia    ENDOSCOPY N/A 09/15/2020    Procedure: ESOPHAGOGASTRODUODENOSCOPY WITH BIOPSIES;  Surgeon: Jose Enrique Louie MD;  Location:  [facility];  Service: Gastroenterology;  Laterality: N/A;  pre: history of melena and esophagitis  post: mild esophagitis and gastritis, small hiatal hernia    ENDOSCOPY N/A 12/4/2023    Procedure: ESOPHAGOGASTRODUODENOSCOPY with bx;  Surgeon: Quoc Elmore MD;  Location:  [facility];  Service: Gastroenterology;  Laterality: N/A;  pre: Coffee-ground emesis  post: hiatal hernia, esophagitis    HERNIA REPAIR Left 12/05/2019    THORACENTESIS      TONSILLECTOMY      VASECTOMY         SLP Recommendation and Plan  SLP Swallowing Diagnosis: moderate, oral dysphagia, pharyngeal dysphagia (09/05/24 0930)  SLP Diet Recommendation: mechanical ground textures, no mixed consistencies, nectar thick liquids (NPO with negative lung changes) (09/05/24 0930)  Recommended Precautions and Strategies: upright posture during/after eating, small bites of food and sips of liquid, alternate between small bites of food and sips of liquid (09/05/24 0930)  SLP Rec. for Method of Medication Administration: meds crushed, with puree, as tolerated (09/05/24 0930)     Monitor for Signs of Aspiration: yes, notify SLP if any concerns (09/05/24 0930)  Recommended Diagnostics: VFSS (MBS) (09/05/24 0930)  Swallow Criteria for Skilled Therapeutic Interventions Met: demonstrates skilled criteria (09/05/24 0930)  Anticipated Discharge Disposition (SLP): unknown (09/05/24 0930)  Rehab Potential/Prognosis, Swallowing: adequate, monitor progress closely (09/05/24 0930)  Therapy Frequency (Swallow): PRN (09/05/24 0930)  Predicted Duration Therapy Intervention (Days): until discharge (09/05/24 0930)  Oral Care Recommendations: Oral Care BID/PRN  "(09/05/24 0930)                                        Plan of Care Reviewed With: patient  Outcome Evaluation: Swallow completed.  Recommend nectar thick liquids, no straws and mechanical soft ground textures with no mixed consistencies.  Medications crushed with purée.  Recommend upright, slow rate, double swallow with all PO, periodic throat clear.  With increased O2 needs are negative along changes recommend NPO.  Moderate oropharyngeal dysphagia, generalized pharyngeal weakness resulting in moderate diffuse residue after the swallow.  Patient able to partially clear with multiple swallows and liquid wash. Silent aspiration of thin liquids observed on VFSS. SLP questions premorbid swallow function given history of CVAs.      SWALLOW EVALUATION (Last 72 Hours)       SLP Adult Swallow Evaluation       Row Name 09/05/24 0930 09/04/24 2188                Rehab Evaluation    Document Type evaluation  -OC evaluation  -OC       Subjective Information no complaints  -OC no complaints  -OC       Patient Observations alert;cooperative;agree to therapy  -OC alert;agree to therapy;cooperative  -OC       Patient Effort good  -OC good  -OC       Symptoms Noted During/After Treatment none  -OC none  -OC          General Information    Patient Profile Reviewed yes  -OC yes  -OC       Pertinent History Of Current Problem -- Patient admitted with ataxia. \"Since the prior MRI of the brain on 09/17/2022 there has been  development of a 5 mm rounded diffusion hyperintense focus in the mid  left cerebellum.  This is most compatible with a tiny acute mid left  cerebellar white matter infarct.\"  -OC       Current Method of Nutrition NPO  -OC NPO  -OC       Precautions/Limitations, Vision WFL;for purposes of eval  -OC WFL;for purposes of eval  -OC       Precautions/Limitations, Hearing WFL;for purposes of eval  -OC WFL;for purposes of eval  -OC       Prior Level of Function-Communication unknown  -OC unknown  -OC       Prior Level of " Function-Swallowing no diet consistency restrictions  -OC no diet consistency restrictions  -OC       Plans/Goals Discussed with patient;agreed upon  -OC patient;agreed upon  -OC       Barriers to Rehab none identified  -OC none identified  -OC       Patient's Goals for Discharge return to PO diet  -OC return to PO diet  -OC          Pain Scale: Numbers Pre/Post-Treatment    Pretreatment Pain Rating 0/10 - no pain  -OC 0/10 - no pain  -OC       Posttreatment Pain Rating 0/10 - no pain  -OC 0/10 - no pain  -OC          Oral Motor Structure and Function    Dentition Assessment natural, present and adequate  -OC natural, present and adequate  -OC       Secretion Management WNL/WFL  -OC WNL/WFL  -OC       Mucosal Quality moist, healthy  -OC moist, healthy  -OC       Volitional Swallow unable to elicit  -OC unable to elicit  -OC       Volitional Cough weak  -OC weak  -OC          Oral Musculature and Cranial Nerve Assessment    Oral Motor General Assessment -- generalized oral motor weakness  -OC       Vocal Impairment, Detail. Cranial Nerve X (Vagus) -- --  weak vocal quality  -OC          Clinical Swallow Eval    Clinical Swallow Evaluation Summary -- Weak vocal quality. Patient demonstrated persistent throat clear and coughing with ice chips, thin via cup, nectar thick via cup, puree, and mechaical soft. No change in vocal quality appreciated. Immediate wet throat clear following all trials. Recommend VFSS to r/o aspiration.  -OC          MBS/VFSS Interpretation    VFSS Summary VFSS completed, Rachel WORKMAN present. Patient presents with moderate oropharngeal dysphagia. Patient demonstrates decreased hyolaryngeal excursion/elevation, reduced base of tongue retraction, generalized pharyngeal weakness. Patient demonstrates premature spillage to the pyriforms before the swallow, penetration before the swallow with thin via cup. Moderate residue pooling to the pyriforms after the swallow. Patient demonstrates no  penetration or aspiration initial sip of thin via straw, incomplete swallow and attempts to clear pharyngeal residue. Patient demonstrates premature spillage of nectar to the vallecula, mild to moderate residue pooling to the piriforms. Able to partially clear nectar residue with repeat swallow. Patient demonstrates premature spillage to the vallecula puree bolus, moderate to severe diffuse pharyngeal residue after the swallow. Premature spillage to mechanical soft to the pyriforms, mod to severe residue after the swallow. Minimal clearance purée and soft solid residue with double swallow, improved with liquid wash. Tongue pumping with regular texture, mild to moderate pharyngeal residue after the swallow. Independent use of thin liquid wash, silent aspiration thin.  -OC --          SLP Communication to Radiology    Summary Statement VFSS completed, Rachel WORKMAN present. Patient presents with moderate oropharngeal dysphagia. Patient demonstrates decreased hyolaryngeal excursion/elevation, reduced base of tongue retraction, generalized pharyngeal weakness. Patient demonstrates premature spillage to the pyriforms before the swallow, penetration before the swallow with thin via cup. Moderate residue pooling to the pyriforms after the swallow. Patient demonstrates no penetration or aspiration initial sip of thin via straw, incomplete swallow and attempts to clear pharyngeal residue. Patient demonstrates premature spillage of nectar to the vallecula, mild to moderate residue pooling to the piriforms. Able to partially clear nectar residue with repeat swallow. Patient demonstrates premature spillage to the vallecula puree bolus, moderate to severe diffuse pharyngeal residue after the swallow. Premature spillage to mechanical soft to the pyriforms, mod to severe residue after the swallow. Minimal clearance purée and soft solid residue with double swallow, improved with liquid wash. Tongue pumping with regular texture,  mild to moderate pharyngeal residue after the swallow. Independent use of thin liquid wash, silent aspiration thin.  -OC --          SLP Evaluation Clinical Impression    SLP Swallowing Diagnosis moderate;oral dysphagia;pharyngeal dysphagia  -OC R/O pharyngeal dysphagia  -OC       Functional Impact risk of aspiration/pneumonia  -OC risk of aspiration/pneumonia  -OC       Rehab Potential/Prognosis, Swallowing adequate, monitor progress closely  -OC good, to achieve stated therapy goals  -OC       Swallow Criteria for Skilled Therapeutic Interventions Met demonstrates skilled criteria  -OC demonstrates skilled criteria  -OC          Recommendations    Therapy Frequency (Swallow) PRN  -OC PRN  -OC       Predicted Duration Therapy Intervention (Days) until discharge  -OC until discharge  -OC       SLP Diet Recommendation mechanical ground textures;no mixed consistencies;nectar thick liquids  NPO with negative lung changes  -OC NPO;other (see comments)  patient requesting PO despite aspiration risks- MD advise  -OC       Recommended Diagnostics VFSS (MBS)  -OC VFSS (MBS)  -OC       Recommended Precautions and Strategies upright posture during/after eating;small bites of food and sips of liquid;alternate between small bites of food and sips of liquid  -OC --       Oral Care Recommendations Oral Care BID/PRN  -OC Oral Care BID/PRN  -OC       SLP Rec. for Method of Medication Administration meds crushed;with puree;as tolerated  -OC meds crushed;with puree;as tolerated  -OC       Monitor for Signs of Aspiration yes;notify SLP if any concerns  -OC yes;notify SLP if any concerns  -OC       Anticipated Discharge Disposition (SLP) unknown  -OC unknown  -OC          Swallow Goals (SLP)    Swallow LTGs -- Swallow Long Term Goal (free text)  -OC          (LTG) Swallow    (LTG) Swallow Tolerate least restrictive diet with no overt s/s aspiration.  -OC Tolerate least restrictive diet with no overt s/s aspiration.  -OC        [place] (Swallow Long Term Goal) independently (over 90% accuracy)  -OC independently (over 90% accuracy)  -OC       Time Frame (Swallow Long Term Goal) by discharge  -OC by discharge  -OC                 User Key  (r) = Recorded By, (t) = Taken By, (c) = Cosigned By      Initials Name Effective Dates    Mela Diaz SLP 08/28/23 -                     EDUCATION  The patient has been educated in the following areas:   Dysphagia (Swallowing Impairment).        SLP GOALS       Row Name 09/05/24 0930 09/04/24 1330          (LTG) Swallow    (LTG) Swallow Tolerate least restrictive diet with no overt s/s aspiration.  -OC Tolerate least restrictive diet with no overt s/s aspiration.  -OC     [place] (Swallow Long Term Goal) independently (over 90% accuracy)  -OC independently (over 90% accuracy)  -OC     Time Frame (Swallow Long Term Goal) by discharge  -OC by discharge  -OC               User Key  (r) = Recorded By, (t) = Taken By, (c) = Cosigned By      Initials Name Provider Type    Mela Diaz SLP Speech and Language Pathologist                         Time Calculation:    Time Calculation- SLP       Row Name 09/05/24 1338             Time Calculation- SLP    SLP Start Time 0900  -OC      SLP Received On 09/05/24  -OC         Untimed Charges    SLP Eval/Re-eval  [facility] Eval Swallow - 58877  -OC      48892-[facility] Fluoro Eval Swallow Minutes 105  -OC         Total Minutes    Untimed Charges Total Minutes 105  -OC       Total Minutes 105  -OC                User Key  (r) = Recorded By, (t) = Taken By, (c) = Cosigned By      Initials Name Provider Type    Mela Diaz SLP Speech and Language Pathologist                    Therapy Charges for Today       Code Description Service Date Service Provider Modifiers Qty    26239758485 HC ST EVAL ORAL PHARYNG SWALLOW 4 9/4/2024 Mela Whitaker SLP GN 1    [id number] HC ST MOTION FLUORO EVAL SWALLOW 7 9/5/2024 Mela Whitaker SLP GN 1                  Mela Whitaker, SLP  9/5/2024

## 2024-09-05 NOTE — THERAPY TREATMENT NOTE
Patient Name: Carlito Mo  : 1955    MRN: [id number]                              Today's Date: 2024       Admit Date: 9/3/2024    Visit Dx:     ICD-10-CM ICD-9-CM   1. Ataxia  R27.0 781.3     Patient Active Problem List   Diagnosis    Major depressive disorder with single episode, in partial remission    Other hyperlipidemia    Type 2 diabetes mellitus, with long-term current use of insulin    Vitamin D deficiency    Erectile dysfunction    Chronic fatigue    Type 2 diabetes mellitus with ophthalmic complication    Benign prostatic hyperplasia with nocturia    History of basal cell carcinoma    Acquired hypothyroidism    Recurrent strokes    Suspected sleep apnea    YAW (obstructive sleep apnea)    Coronary artery disease involving native coronary artery of native heart without angina pectoris    Chronically elevated troponin    Colon cancer screening    Enlarged lymph nodes    Functional gait abnormality    History of myocardial infarction    Insomnia    Iron deficiency anemia    Major depression, recurrent    Essential hypertension    Acute on chronic renal insufficiency    Falls frequently    Acute on chronic anemia    Neurogenic orthostatic hypotension    Anemia, chronic disease    History of colon polyps    Helicobacter pylori gastritis    Esophagitis    Embolic stroke    Patent foramen ovale    Cardiomyopathy    Diarrhea    Generalized weakness    Paroxysmal atrial fibrillation    Chronic anticoagulation    Acute ischemic stroke    History of stroke    Iron deficiency anemia    Acute HFrEF (heart failure with reduced ejection fraction)    ESRD (end stage renal disease)    Hemoptysis    Chronic HFrEF (heart failure with reduced ejection fraction)    Hemodialysis patient    Exertional dyspnea    Severe malnutrition    Ataxia    Cerebellar stroke, acute    Medically noncompliant     Past Medical History:   Diagnosis Date    Acquired hypothyroidism     Acute sinusitis     SYLVAIN (acute kidney injury)      on CKD    Anemia     Arthritis     Atrial fibrillation     CAD (coronary artery disease)     Chronic combined systolic and diastolic congestive heart failure     COPD (chronic obstructive pulmonary disease)     Depression     Diabetic neuropathy 04/11/2022    Diabetes mellitus due to underlying condition    Esophagitis 06/10/2020    Added automatically from request for surgery [id number]      ESRD (end stage renal disease) 12/01/2023    Frequent PVCs     Generalized weakness     GERD (gastroesophageal reflux disease)     Helicobacter pylori gastritis 06/04/2020    Hyperlipidemia     Hypertension     Hypertensive encephalopathy     Pleural effusion     Pneumonia     Poor historian     RBBB (right bundle branch block)     Stroke     POOR VISION    TIA (transient ischemic attack)     Type 2 diabetes mellitus     Urinary retention     Vitamin D deficiency      Past Surgical History:   Procedure Laterality Date    APPENDECTOMY N/A 02/14/2016    Dr. Alexey Dodson    ARTERIOVENOUS FISTULA/SHUNT SURGERY Right 06/03/2022    Procedure: RIGHT FOREARM ARTERIOVENOUS FISTULA PLACEMENT RADIOCEPHALIC WITH CEPHALIC VEIN TRANSPOSITION;  Surgeon: Eliseo Willis MD;  Location: [facility] OR;  Service: Vascular;  Laterality: Right;    COLONOSCOPY      over 20 years ago.  no polyps     COLONOSCOPY N/A 09/18/2018    Procedure: COLONOSCOPY;  Surgeon: Jose Enrique Louie MD;  Location: [facility] ENDOSCOPY;  Service: Gastroenterology    COLONOSCOPY N/A 09/19/2018    IH, EH, polyps (TAs w/low grade dysplasia)    COLONOSCOPY N/A 06/01/2020    Procedure: COLONOSCOPY;  Surgeon: Jose Enrique Louie MD;  Location: [facility] ENDOSCOPY;  Service: Gastroenterology;  Laterality: N/A;  Pre op-Anemia, Melena, History of Polyps  Post op-To Cecum/TI, Polyp, Poor Prep    CORONARY ARTERY BYPASS GRAFT  11/2001    ENDOSCOPY N/A 05/29/2020    Procedure: ESOPHAGOGASTRODUODENOSCOPY with biopsies;  Surgeon: Amanda Lovelace MD;  Location: [facility] ENDOSCOPY;   Service: Gastroenterology;  Laterality: N/A;  pre-anemia, dark stools  post-esophagitis, hiatal hernia    ENDOSCOPY N/A 09/15/2020    Procedure: ESOPHAGOGASTRODUODENOSCOPY WITH BIOPSIES;  Surgeon: Jose Enrique Louie MD;  Location: [facility] ENDOSCOPY;  Service: Gastroenterology;  Laterality: N/A;  pre: history of melena and esophagitis  post: mild esophagitis and gastritis, small hiatal hernia    ENDOSCOPY N/A 12/4/2023    Procedure: ESOPHAGOGASTRODUODENOSCOPY with bx;  Surgeon: Quoc Elmore MD;  Location: [facility] ENDOSCOPY;  Service: Gastroenterology;  Laterality: N/A;  pre: Coffee-ground emesis  post: hiatal hernia, esophagitis    HERNIA REPAIR Left 12/05/2019    THORACENTESIS      TONSILLECTOMY      VASECTOMY        General Information       Row Name 09/05/24 1524          Physical Therapy Time and Intention    Document Type therapy note (daily note)  -CW (r) SF (t) CW (c)     Mode of Treatment physical therapy  -CW (r) SF (t) CW (c)       Row Name 09/05/24 1524          General Information    Patient Profile Reviewed yes  -CW (r) SF (t) CW (c)     Existing Precautions/Restrictions fall  -CW (r) SF (t) CW (c)       Row Name 09/05/24 1524          Cognition    Orientation Status (Cognition) oriented x 3  -CW (r) SF (t) CW (c)       Row Name 09/05/24 1524          Safety Issues, Functional Mobility    Impairments Affecting Function (Mobility) balance;endurance/activity tolerance;strength  -CW (r) SF (t) CW (c)               User Key  (r) = Recorded By, (t) = Taken By, (c) = Cosigned By      Initials Name Provider Type    CW Carlotta Go, PT Physical Therapist    SF Heike Kay, PT Student PT Student                   Mobility       Row Name 09/05/24 1525          Bed Mobility    Bed Mobility supine-sit;sit-supine  -CW (r) SF (t) CW (c)     Supine-Sit [place] (Bed Mobility) standby assist  -CW (r) SF (t) CW (c)     Sit-Supine [place] (Bed Mobility) standby assist  -CW (r) SF (t) CW (c)      Assistive Device (Bed Mobility) bed rails;head of bed elevated  -CW (r) SF (t) CW (c)       Row Name 09/05/24 1525          Sit-Stand Transfer    Sit-Stand [place] (Transfers) minimum assist (75% patient effort);1 person assist;verbal cues  -CW     Assistive Device (Sit-Stand Transfers) walker, front-wheeled  -CW (r) SF (t) CW (c)       Row Name 09/05/24 1525          Gait/Stairs (Locomotion)    [place] Level (Gait) minimum assist (75% patient effort);1 person assist;verbal cues  -CW     Assistive Device (Gait) walker, front-wheeled  -CW (r) SF (t) CW (c)     Distance in Feet (Gait) 40  -CW (r) SF (t) CW (c)     Deviations/Abnormal Patterns (Gait) eli decreased;gait speed decreased  -CW (r) SF (t) CW (c)               User Key  (r) = Recorded By, (t) = Taken By, (c) = Cosigned By      Initials Name Provider Type    Carlotta Perdomo, PT Physical Therapist    Heike Cleaning, PT Student PT Student                   Obj/Interventions       Row Name 09/05/24 1526          Balance    Static Sitting Balance standby assist  -CW (r) SF (t) CW (c)     Dynamic Sitting Balance standby assist  -CW (r) SF (t) CW (c)     Position, Sitting Balance sitting edge of bed  -CW (r) SF (t) CW (c)     Static Standing Balance minimal assist;1-person assist  -CW (r) SF (t) CW (c)     Dynamic Standing Balance minimal assist;1-person assist  -CW (r) SF (t) CW (c)     Position/Device Used, Standing Balance walker, rolling  -CW (r) SF (t) CW (c)               User Key  (r) = Recorded By, (t) = Taken By, (c) = Cosigned By      Initials Name Provider Type    Carlotta Perdomo, PT Physical Therapist    Heike Cleaning, PT Student PT Student                   Goals/Plan    No documentation.                  Clinical Impression       Row Name 09/05/24 1527          Pain    Pretreatment Pain Rating 0/10 - no pain  -CW (r) SF (t) CW (c)       Row Name 09/05/24 1527          Plan of Care Review    Plan of Care Reviewed With  patient  -CW (r) SF (t) CW (c)     Outcome Evaluation Pt seen for PT this afternoon. Pt sat up to EOB with SBA and stood with min A x 1 and rolling walker. He AMB 40 ft with min A x 1 and rolling walker. He demonstrated decreased pace and slight unsteadiness but no loss of balance. Following AMB he returned to bed with SBA. PT will continue to follow up and progress as tolerated to prepare for d/c.  -CW (r) SF (t) CW (c)       Row Name 09/05/24 1527          Vital Signs    O2 Delivery Pre Treatment room air  -CW (r) SF (t) CW (c)       Row Name 09/05/24 1527          Positioning and Restraints    Pre-Treatment Position in bed  -CW (r) SF (t) CW (c)     Post Treatment Position bed  -CW (r) SF (t) CW (c)     In Bed fowlers;call light within reach;encouraged to call for assist;exit alarm on  -CW (r) SF (t) CW (c)               User Key  (r) = Recorded By, (t) = Taken By, (c) = Cosigned By      Initials Name Provider Type    CW Carlotta Go, PT Physical Therapist    Heike Cleaning, PT Student PT Student                   Outcome Measures       Row Name 09/05/24 1531 09/05/24 0758       How much help from another person do you currently need...    Turning from your back to your side while in flat bed without using bedrails? 4  -CW (r) SF (t) CW (c) 4  -SFA    Moving from lying on back to sitting on the side of a flat bed without bedrails? 3  -CW 3  -SFA    Moving to and from a bed to a chair (including a wheelchair)? 3  -CW (r) SF (t) CW (c) 3  -SFA    Standing up from a chair using your arms (e.g., wheelchair, bedside chair)? 3  -CW (r) SF (t) CW (c) 3  -SFA    Climbing 3-5 steps with a railing? 2  -CW 2  -SFA    To walk in hospital room? 3  -CW (r) SF (t) CW (c) 3  -SFA    AM-PAC 6 Clicks Score (PT) 18  -CW (r) SF (t) 18  -SFA    Highest Level of Mobility Goal 6 --> Walk 10 steps or more  -CW (r) SF (t) 6 --> Walk 10 steps or more  -SFA              User Key  (r) = Recorded By, (t) = Taken By, (c) = Cosigned  By      Initials Name Provider Type    CW Carlotta Go, PT Physical Therapist    Amanda Hillman RN Registered Nurse    Heike Cleaning, PT Student PT Student                                 Physical Therapy Education       Title: PT OT SLP Therapies (In Progress)       Topic: Physical Therapy (In Progress)       Point: Mobility training (Done)       Learning Progress Summary             Patient Acceptance, E, VU by SF at 9/5/2024 1531    Acceptance, E,TB, VU by EI at 9/5/2024 0513    Acceptance, E, VU,NR by SF at 9/4/2024 1424                         Point: Home exercise program (Not Started)       Learner Progress:  Not documented in this visit.              Point: Body mechanics (Not Started)       Learner Progress:  Not documented in this visit.              Point: Precautions (Not Started)       Learner Progress:  Not documented in this visit.                              User Key       Initials Effective Dates Name Provider Type Discipline    EI 06/16/21 -  Nargis Alvarez RN Registered Nurse Nurse     08/08/24 -  Heike Kay, PT Student PT Student PT                  PT Recommendation and Plan  Planned Therapy Interventions (PT): balance training, bed mobility training, gait training, home exercise program, patient/family education, ROM (range of motion), stair training, strengthening, stretching, transfer training  Plan of Care Reviewed With: patient  Outcome Evaluation: Pt seen for PT this afternoon. Pt sat up to EOB with SBA and stood with min A x 1 and rolling walker. He AMB 40 ft with min A x 1 and rolling walker. He demonstrated decreased pace and slight unsteadiness but no loss of balance. Following AMB he returned to bed with SBA. PT will continue to follow up and progress as tolerated to prepare for d/c.     Time Calculation:         PT Charges       Row Name 09/05/24 1531             Time Calculation    Start Time 1319  -CW (r) SF (t) CW (c)      Stop Time 1329  -CW (r) SF (t) CW  (c)      Time Calculation (min) 10 min  -CW (r) SF (t)      PT Received On 09/05/24  -CW (r) SF (t) CW (c)      PT - Next Appointment 09/06/24  -CW (r) SF (t) CW (c)         Time Calculation- PT    Total Timed Code Minutes- PT 10 minute(s)  -CW (r) SF (t) CW (c)                User Key  (r) = Recorded By, (t) = Taken By, (c) = Cosigned By      Initials Name Provider Type    Carlotta Perdomo, PT Physical Therapist    SF Heike Kay, PT Student PT Student                  Therapy Charges for Today       Code Description Service Date Service Provider Modifiers Qty    82562084153 HC PT EVAL MOD COMPLEXITY 3 9/4/2024 Heike Kay, PT Student GP 1    30552918291 HC PT THERAPEUTIC ACT EA 15 MIN 9/4/2024 Heike Kay, PT Student GP 1    50462070068 HC GAIT TRAINING EA 15 MIN 9/5/2024 Heike Kay, PT Student GP 1            PT G-Codes  Outcome Measure Options: AM-PAC 6 Clicks Daily Activity (OT), Modified [place]  AM-PAC 6 Clicks Score (PT): 18  AM-PAC 6 Clicks Score (OT): 17  Modified [place] Scale: 4 - Moderately severe disability.  Unable to walk without assistance, and unable to attend to own bodily needs without assistance.  PT Discharge Summary  Anticipated Discharge Disposition (PT): skilled nursing facility    Heike Kay PT Student  9/5/2024

## 2024-09-05 NOTE — NURSING NOTE
Diabetes Education    Patient Name:  Carlito Mo  YOB: 1955  MRN: [id number]  Admit Date:  9/3/2024        Diabetes educator consulted. Pt to go to SNF at discharge. Please re-consult for new educational needs.      Electronically signed by:  Tim Colon RN  09/05/24 08:43 EDT

## 2024-09-05 NOTE — PROGRESS NOTES
"DOS: 2024  NAME: Carlito Mo   : 1955  PCP: Belle Simons APRN  Chief Complaint   Patient presents with    Dizziness    Hypotension     Stroke    Subjective: Patient resting in bed with no complaints of dizziness or nausea today. Thinks walking is about the same as yesterday but only got up briefly today. Patient NPO still after failed swallow test yesterday with video swallow test performed this am and results still pending. Patient denies any other focal deficits today. No acute events over night. No family at bedside. Only complaint is that he is hungry due to being NPO.     Interval History  History taken from: patient    Objective:  Vital signs: /69 (BP Location: Left arm, Patient Position: Lying)   Pulse 64   Temp 98 °F (36.7 °C) (Oral)   Resp 18   Ht 182.9 cm (72\")   Wt 60.2 kg (132 lb 11.2 oz)   SpO2 100%   BMI 18.00 kg/m²       Physical Exam:  GENERAL: NAD  MS: AOx4. Language normal. No neglect. Higher integrative function normal  CN: II-XII normal  Motor: Normal strength and tone throughout.  Sensory: decrease to cold temperature in bilateral lower extremities  Coordination: Normal finger to nose bilaterally  Finger to thumb tap: slowed bilaterally today    Results Review:     I reviewed the patient's new clinical results.    Current Medications:  Scheduled Medications:apixaban, 2.5 mg, Oral, Q12H  aspirin, 325 mg, Oral, Daily   Or  aspirin, 300 mg, Rectal, Daily  atorvastatin, 80 mg, Oral, Nightly  budesonide-formoterol, 2 puff, Inhalation, BID - RT  insulin regular, 2-7 Units, Subcutaneous, Q6H  levothyroxine, 75 mcg, Oral, Q AM      Infusions:    PRN Medications:    acetaminophen **OR** acetaminophen    bisacodyl    dextrose    dextrose    glucagon (human recombinant)    labetalol    ondansetron    Medications Reviewed:     Laboratory results:  Lab Results   Component Value Date    GLUCOSE 124 (H) 2024    CALCIUM 9.1 2024     (L) 2024    K 4.0 " Spoke with Dr. Dow re: K+6.1, previously 5.5. Pt on iv Nacl 0.45 % with sodium bicarbonate 75 mEq/L infusing at 100 cc/hr. No new order received. Will recheck BMP in am as ordered.     "09/05/2024    CO2 27.0 09/05/2024    CL 98 09/05/2024    BUN 51 (H) 09/05/2024    CREATININE 3.99 (H) 09/05/2024    EGFRIFAFRI 23 (L) 09/27/2022    EGFRIFNONA 19 (L) 02/04/2022    BCR 12.8 09/05/2024    ANIONGAP 10.0 09/05/2024     Lab Results   Component Value Date    WBC 4.33 09/05/2024    HGB 9.7 (L) 09/05/2024    HCT 30.0 (L) 09/05/2024    MCV 85.5 09/05/2024     09/05/2024      Results from last 7 days   Lab Units 09/04/24  0416   CHOLESTEROL mg/dL 182     Lab Results   Component Value Date    INR 1.19 (H) 05/23/2024    INR 1.54 (H) 01/04/2024    INR 1.54 (H) 01/03/2024    PROTIME 15.4 (H) 05/23/2024    PROTIME 18.8 (H) 01/04/2024    PROTIME 18.7 (H) 01/03/2024     Lab Results   Component Value Date    TSH 3.010 09/04/2024     No components found for: \"LDLCALC\"  Lab Results   Component Value Date    HGBA1C 7.40 (H) 09/04/2024     No components found for: \"B12\"    Review and interpretation of imaging:  CTA head and neck:  FINDINGS: Intracranial vertebral arteries are patent. Basilar artery is  also patent. Both P1 segments are very hypoplastic. There are  well-developed posterior communicating arteries bilaterally. Posterior  cerebral arteries are patent. Intracranial internal carotid arteries are  patent. There is an anterior communicating artery. Right A1 is somewhat  hypoplastic. The middle cerebral and anterior cerebral arteries are  patent.    Radiation dose reduction techniques were utilized, including automated  exposure control and exposure modulation based on body size.  IMPRESSION:  1. 1. No acute intracranial findings.  2. Severe narrowing at the origin of the left vertebral artery, and  moderate stenosis of the origin of the right vertebral artery.  3. Approximately 40 to 50% stenosis of the proximal right internal  carotid artery.  4. No intracranial stenosis or occlusion.    Impression: Patient is a 69 y.o. male with history of ESRD on dialysis, HTN, HLD, diabetes, multiple strokes, " paroxysmal atrial fibrillation Eliquis 5 mg BID, small PFO, neurogenic orthostatic hypotension presented to the ED 9/3 with dizziness and N/V. Patient had just gotten home from dialysis and was walking to his bed room when he suddenly developed lightheadedness like he was going to pass out, N/V, and SOA so he sat down on the stairs. He denied chest pain/pressure, palpitations, abdominal pain, double vision, blurry vision, speech deficit, weakness or numbness. Patient said this spell only lasted a few minutes. However when he got up to try to walk and could not which he normally can with cane. He said this persisted yesterday and has improved some today. He denied headache. BP on arrival was 146/83 mmHg and pulse was 71 bpm. Temperature was 96.4 degrees F, RR 16, and O2 was 100%. Patient admit to only taking Eliquis every other day maybe and not taking ASA and statin regularly.     CTH demonstrates no convincing acute abnormality, severe small vessel disease, old left parietal infarct, old right cerebellar infarct, old left cerebellar infarct, old bilateral thalamic infarcts. TTE demonstates EF of 44.7% and negative saline tests although has small PFO from prior 3D ECHO. MRI brain demonstrates acute small left cerebellum infarct with no significant other change. There is multiple punctate old microhemorrhages  4 in the left thalamus and 4 in the right thalamus, and a 2 mm focus in the right side of the malcolm and these are good locations for old hypertensive microhemorrhages. There is a 4 mm focus in the inferior medial left cerebellum and a 4-5 mm rounded focus in the superior lateral right occipital juxtacortical white matter that could be secondary to amyloid. There is also diffuse cerebral atrophy noted. CTA note severe narrowing of left vertebral, moderate right vertebral stenosis and mild proximal right ICA stenosis. A1c 7/40. .     Patient high risk for vascular dementia with multiple strokes. He has  significant left vert stenosis but not good candidate for stent placement due to medication compliance. Unsure why patient is non-compliant with medications says he just forgets but appears somewhat depressed on examination. Mobility also below baseline so likely will need some form of outpatient PT. Wife wanting some possible help at home with patient if possible as she worries he will require more help with ADL's. Case management consulted.      Diagnoses:  Acute ischemic left cerebellum infarct, likely from atrial fibrillation and non-compliance with eliquis  History of multiple recurrent strokes  Atrial fibrillation with Eliquis 5 mg BID  Small PFO  Multiple bilateral chronic micro hemorrhages concerning for amyloid vs hypertensive hemorrhage  HTN  HLD with , goal <70  Type 2 Diabetes with A1c 7.4, goal <7  Multiple recurrent falls  Neurogenic orthostatic hypotension   Medication non-compliance        Plan:  -Give aspirin now; if cannot swallow give NH  -Eliquis 2.5 mg BID  -statin   -BP control  -Perform bedside swallow test  -Telemetry to monitor for arrhythmia  -VTE prophylaxis  -Neuro checks, if change in exam call neuro oncall  -PT/OT below functional baseline  -case management consult to discuss home health options  -SLP failed swallow test so currently still NPO, video swallow study pending today  -Stroke Education  -Importance of medication compliance discussed again today  -Avoidance of dehydration and hypotension discussed   -Follow up outpatient in 2-3 months with neurology outpatient for stroke and cognitive impairment.  -No further neuro work-up planned at this time, will sign off, please call with questions.     I have discussed the above with the patient and family.  Emily Uriarte PA-C  09/05/24  11:54 EDT        Cerebellar stroke, acute    Type 2 diabetes mellitus, with long-term current use of insulin    Acquired hypothyroidism    Essential hypertension    Anemia, chronic disease     Patent foramen ovale    Paroxysmal atrial fibrillation    ESRD (end stage renal disease)    Chronic HFrEF (heart failure with reduced ejection fraction)    Ataxia    Medically noncompliant

## 2024-09-06 LAB
ALBUMIN SERPL-MCNC: 3.8 G/DL (ref 3.5–5.2)
ANION GAP SERPL CALCULATED.3IONS-SCNC: 9.6 MMOL/L (ref 5–15)
BUN SERPL-MCNC: 32 MG/DL (ref 8–23)
BUN/CREAT SERPL: 10.3 (ref 7–25)
CALCIUM SPEC-SCNC: 8.9 MG/DL (ref 8.6–10.5)
CHLORIDE SERPL-SCNC: 99 MMOL/L (ref 98–107)
CO2 SERPL-SCNC: 28.4 MMOL/L (ref 22–29)
CREAT SERPL-MCNC: 3.1 MG/DL (ref 0.76–1.27)
EGFRCR SERPLBLD CKD-EPI 2021: 21 ML/MIN/1.73
GLUCOSE BLDC GLUCOMTR-MCNC: 100 MG/DL (ref 70–130)
GLUCOSE BLDC GLUCOMTR-MCNC: 141 MG/DL (ref 70–130)
GLUCOSE BLDC GLUCOMTR-MCNC: 157 MG/DL (ref 70–130)
GLUCOSE BLDC GLUCOMTR-MCNC: 189 MG/DL (ref 70–130)
GLUCOSE BLDC GLUCOMTR-MCNC: 236 MG/DL (ref 70–130)
GLUCOSE SERPL-MCNC: 131 MG/DL (ref 65–99)
HBV SURFACE AB SER RIA-ACNC: REACTIVE
PHOSPHATE SERPL-MCNC: 4 MG/DL (ref 2.5–4.5)
POTASSIUM SERPL-SCNC: 4 MMOL/L (ref 3.5–5.2)
SODIUM SERPL-SCNC: 137 MMOL/L (ref 136–145)

## 2024-09-06 PROCEDURE — 97535 SELF CARE MNGMENT TRAINING: CPT

## 2024-09-06 PROCEDURE — 86704 HEP B CORE ANTIBODY TOTAL: CPT | Performed by: STUDENT IN AN ORGANIZED HEALTH CARE EDUCATION/TRAINING PROGRAM

## 2024-09-06 PROCEDURE — 94664 DEMO&/EVAL PT USE INHALER: CPT

## 2024-09-06 PROCEDURE — 63710000001 INSULIN REGULAR HUMAN PER 5 UNITS: Performed by: HOSPITALIST

## 2024-09-06 PROCEDURE — 94799 UNLISTED PULMONARY SVC/PX: CPT

## 2024-09-06 PROCEDURE — 97530 THERAPEUTIC ACTIVITIES: CPT

## 2024-09-06 PROCEDURE — 94761 N-INVAS EAR/PLS OXIMETRY MLT: CPT

## 2024-09-06 PROCEDURE — 82948 REAGENT STRIP/BLOOD GLUCOSE: CPT

## 2024-09-06 PROCEDURE — 86706 HEP B SURFACE ANTIBODY: CPT | Performed by: STUDENT IN AN ORGANIZED HEALTH CARE EDUCATION/TRAINING PROGRAM

## 2024-09-06 PROCEDURE — 80069 RENAL FUNCTION PANEL: CPT | Performed by: INTERNAL MEDICINE

## 2024-09-06 RX ADMIN — INSULIN HUMAN 3 UNITS: 100 INJECTION, SOLUTION PARENTERAL at 13:00

## 2024-09-06 RX ADMIN — ASPIRIN 325 MG: 325 TABLET ORAL at 08:44

## 2024-09-06 RX ADMIN — BUDESONIDE AND FORMOTEROL FUMARATE DIHYDRATE 2 PUFF: 160; 4.5 AEROSOL RESPIRATORY (INHALATION) at 08:39

## 2024-09-06 RX ADMIN — APIXABAN 2.5 MG: 2.5 TABLET, FILM COATED ORAL at 08:44

## 2024-09-06 RX ADMIN — LEVOTHYROXINE SODIUM 75 MCG: 75 TABLET ORAL at 06:51

## 2024-09-06 RX ADMIN — INSULIN HUMAN 2 UNITS: 100 INJECTION, SOLUTION PARENTERAL at 18:51

## 2024-09-06 RX ADMIN — ATORVASTATIN CALCIUM 80 MG: 80 TABLET, FILM COATED ORAL at 20:54

## 2024-09-06 RX ADMIN — INSULIN HUMAN 2 UNITS: 100 INJECTION, SOLUTION PARENTERAL at 06:51

## 2024-09-06 RX ADMIN — BUDESONIDE AND FORMOTEROL FUMARATE DIHYDRATE 2 PUFF: 160; 4.5 AEROSOL RESPIRATORY (INHALATION) at 20:20

## 2024-09-06 RX ADMIN — APIXABAN 2.5 MG: 2.5 TABLET, FILM COATED ORAL at 20:54

## 2024-09-06 NOTE — PLAN OF CARE
Goal Outcome Evaluation:         Patient denies pain nor discomfort,vital signs stable, SR on the monitor, pt started on diet, educate to call for assist,  call light intact, bed alarm on , cont to monitor.

## 2024-09-06 NOTE — CASE MANAGEMENT/SOCIAL WORK
Post-Acute Authorization Submission      Post Acute Pre-Cert Documentation  Request Submitted by Facility - Type:: Hospital  Post-Acute Authorization Type Submitted:: SNF  Date Post Acute Pre-Cert Inititated per Facility: 09/06/24  Date Post Acute Pre-Cert Completed: 09/06/24  Accepting Facility: [facility] Discharge Date Requested: 09/07/24  All Clinicals Submitted?: Yes  Had Accepting Facility at Time of Submission: Yes  Response Communicated to:: , Accepting Facility Liaison, Accepting Facility Auth Department  Authorization Number:: APPROVED [id number]  Post Acute Pre-Cert Initiated Comment: VALID TO ADMIT UPTO 9/13/2024.              Keny Milner, PCT

## 2024-09-06 NOTE — PLAN OF CARE
Goal Outcome Evaluation:  Plan of Care Reviewed With: patient           Outcome Evaluation: Pt was found sitting UIC, agreeable to ax. He ambulates to the bathroom with [place] using RW, toilet TF with BSC over toilet for elevated height. He requires increased assist for thoroughness after BM as he is quick to fatigue (maxA) and recliner pulled to BR door d/t poor act tolerance. Vitals monitored as pt demo's DE LEON. OT will cont to follow, recommend d/c SNF

## 2024-09-06 NOTE — CASE MANAGEMENT/SOCIAL WORK
Continued Stay Note  [facility]     Patient Name: Carlito Mo  MRN: [id number]  Today's Date: 9/6/2024    Admit Date: 9/3/2024    Plan: [facility] precert approved, bed available Sunday.  Will need transport arranged.   Discharge Plan       Row Name 09/06/24 1434       Plan    Plan [facility] precert approved, bed available Sunday.  Will need transport arranged.    Plan Comments Riddhi/A has ordered the Hep B Total Core Antibody and Hep B surface antibody that Jean-Paul with [facility] requested.  [facility] uses [facility] at their facility and they need these labs.  Jean-Paul confirmed Pt has a chair time ready for next Tuesday, 9/10/24.  Rehab bed is available Sunday.  CCP spoke with Pt spouse Lissette Mo via phone at length about his d/c plan and acceptance at rehab.  Spouse advised she does not have the strength to assist him at home and needs information on caregivers.  CCP discussed the In-home caregiver list and also text her a copy of all the choices.  Spouse is in agreement with Pt going to [facility] at d/c  and he will need transportation.  Rehab pharmacy has been updated ([facility]).  Packet is in Pt folder on [facility].  CCP following............Ame BERNAL/ASIA                   Discharge Codes    No documentation.                 Expected Discharge Date and Time       Expected Discharge Date Expected Discharge Time    Sep 7, 2024               Ame Cardoza RN

## 2024-09-06 NOTE — PLAN OF CARE
Goal Outcome Evaluation:  Plan of Care Reviewed With: patient           Outcome Evaluation: Pt participated with PT this PM. Pt up in chair at arrival and agreeable to mobilize. He completed STS with min A and ambulated 15' to the bathroom min A with walker. Pt became SOA and fatigued after using commode, chair brought up closer to bathroom for safety. Vital signs monitored and WFL throughout. PT continues to recommend SNF at d/c.      Anticipated Discharge Disposition (PT): skilled nursing facility

## 2024-09-06 NOTE — THERAPY TREATMENT NOTE
Patient Name: Carlito Mo  : 1955    MRN: [id number]                              Today's Date: 2024       Admit Date: 9/3/2024    Visit Dx:     ICD-10-CM ICD-9-CM   1. Ataxia  R27.0 781.3     Patient Active Problem List   Diagnosis    Major depressive disorder with single episode, in partial remission    Other hyperlipidemia    Type 2 diabetes mellitus, with long-term current use of insulin    Vitamin D deficiency    Erectile dysfunction    Chronic fatigue    Type 2 diabetes mellitus with ophthalmic complication    Benign prostatic hyperplasia with nocturia    History of basal cell carcinoma    Acquired hypothyroidism    Recurrent strokes    Suspected sleep apnea    YAW (obstructive sleep apnea)    Coronary artery disease involving native coronary artery of native heart without angina pectoris    Chronically elevated troponin    Colon cancer screening    Enlarged lymph nodes    Functional gait abnormality    History of myocardial infarction    Insomnia    Iron deficiency anemia    Major depression, recurrent    Essential hypertension    Acute on chronic renal insufficiency    Falls frequently    Acute on chronic anemia    Neurogenic orthostatic hypotension    Anemia, chronic disease    History of colon polyps    Helicobacter pylori gastritis    Esophagitis    Embolic stroke    Patent foramen ovale    Cardiomyopathy    Diarrhea    Generalized weakness    Paroxysmal atrial fibrillation    Chronic anticoagulation    Acute ischemic stroke    History of stroke    Iron deficiency anemia    Acute HFrEF (heart failure with reduced ejection fraction)    ESRD (end stage renal disease)    Hemoptysis    Chronic HFrEF (heart failure with reduced ejection fraction)    Hemodialysis patient    Exertional dyspnea    Severe malnutrition    Ataxia    Cerebellar stroke, acute    Medically noncompliant     Past Medical History:   Diagnosis Date    Acquired hypothyroidism     Acute sinusitis     SYLVAIN (acute kidney injury)      on CKD    Anemia     Arthritis     Atrial fibrillation     CAD (coronary artery disease)     Chronic combined systolic and diastolic congestive heart failure     COPD (chronic obstructive pulmonary disease)     Depression     Diabetic neuropathy 04/11/2022    Diabetes mellitus due to underlying condition    Esophagitis 06/10/2020    Added automatically from request for surgery [id number]      ESRD (end stage renal disease) 12/01/2023    Frequent PVCs     Generalized weakness     GERD (gastroesophageal reflux disease)     Helicobacter pylori gastritis 06/04/2020    Hyperlipidemia     Hypertension     Hypertensive encephalopathy     Pleural effusion     Pneumonia     Poor historian     RBBB (right bundle branch block)     Stroke     POOR VISION    TIA (transient ischemic attack)     Type 2 diabetes mellitus     Urinary retention     Vitamin D deficiency      Past Surgical History:   Procedure Laterality Date    APPENDECTOMY N/A 02/14/2016    Dr. Alexey Dodson    ARTERIOVENOUS FISTULA/SHUNT SURGERY Right 06/03/2022    Procedure: RIGHT FOREARM ARTERIOVENOUS FISTULA PLACEMENT RADIOCEPHALIC WITH CEPHALIC VEIN TRANSPOSITION;  Surgeon: Eliseo Willis MD;  Location: [facility] OR;  Service: Vascular;  Laterality: Right;    COLONOSCOPY      over 20 years ago.  no polyps     COLONOSCOPY N/A 09/18/2018    Procedure: COLONOSCOPY;  Surgeon: Jose Enrique Louie MD;  Location: [facility] ENDOSCOPY;  Service: Gastroenterology    COLONOSCOPY N/A 09/19/2018    IH, EH, polyps (TAs w/low grade dysplasia)    COLONOSCOPY N/A 06/01/2020    Procedure: COLONOSCOPY;  Surgeon: Jose Enrique Louie MD;  Location: [facility] ENDOSCOPY;  Service: Gastroenterology;  Laterality: N/A;  Pre op-Anemia, Melena, History of Polyps  Post op-To Cecum/TI, Polyp, Poor Prep    CORONARY ARTERY BYPASS GRAFT  11/2001    ENDOSCOPY N/A 05/29/2020    Procedure: ESOPHAGOGASTRODUODENOSCOPY with biopsies;  Surgeon: Amanda Lovelace MD;  Location: [facility] ENDOSCOPY;   Service: Gastroenterology;  Laterality: N/A;  pre-anemia, dark stools  post-esophagitis, hiatal hernia    ENDOSCOPY N/A 09/15/2020    Procedure: ESOPHAGOGASTRODUODENOSCOPY WITH BIOPSIES;  Surgeon: Jose Enrique Louie MD;  Location: [facility] ENDOSCOPY;  Service: Gastroenterology;  Laterality: N/A;  pre: history of melena and esophagitis  post: mild esophagitis and gastritis, small hiatal hernia    ENDOSCOPY N/A 12/4/2023    Procedure: ESOPHAGOGASTRODUODENOSCOPY with bx;  Surgeon: Quoc Elmore MD;  Location: [facility] ENDOSCOPY;  Service: Gastroenterology;  Laterality: N/A;  pre: Coffee-ground emesis  post: hiatal hernia, esophagitis    HERNIA REPAIR Left 12/05/2019    THORACENTESIS      TONSILLECTOMY      VASECTOMY        General Information       Row Name 09/06/24 1529          Physical Therapy Time and Intention    Document Type therapy note (daily note)  -CW     Mode of Treatment physical therapy;individual therapy  -CW       Row Name 09/06/24 1529          General Information    Patient Profile Reviewed yes  -CW     Existing Precautions/Restrictions fall  -CW       Row Name 09/06/24 1529          Cognition    Orientation Status (Cognition) oriented x 3  -CW       Row Name 09/06/24 1529          Safety Issues, Functional Mobility    Impairments Affecting Function (Mobility) balance;endurance/activity tolerance;strength;shortness of breath  -CW               User Key  (r) = Recorded By, (t) = Taken By, (c) = Cosigned By      Initials Name Provider Type    CW Carlotta Go PT Physical Therapist                   Mobility       Row Name 09/06/24 1530          Bed Mobility    Comment, (Bed Mobility) UIC at arrival and departure  -CW       Row Name 09/06/24 1530          Gait/Stairs (Locomotion)    [place] Level (Gait) minimum assist (75% patient effort);verbal cues;1 person assist  -CW     Assistive Device (Gait) walker, front-wheeled  -CW     Distance in Feet (Gait) 15  then 5 additional feet  -CW      Deviations/Abnormal Patterns (Gait) eli decreased;gait speed decreased;stride length decreased  -CW     Bilateral Gait Deviations forward flexed posture;heel strike decreased  -CW     Comment, (Gait/Stairs) reports SOA with activity  -CW               User Key  (r) = Recorded By, (t) = Taken By, (c) = Cosigned By      Initials Name Provider Type    Carlotta Perdomo PT Physical Therapist                   Obj/Interventions       Row Name 09/06/24 1532          Balance    Balance Assessment standing static balance;standing dynamic balance  -CW     Static Standing Balance minimal assist  -CW     Dynamic Standing Balance minimal assist  -CW     Position/Device Used, Standing Balance supported;walker, front-wheeled  -CW               User Key  (r) = Recorded By, (t) = Taken By, (c) = Cosigned By      Initials Name Provider Type    Carlotta Perdomo PT Physical Therapist                   Goals/Plan    No documentation.                  Clinical Impression       Row Name 09/06/24 1533          Pain    Pretreatment Pain Rating 0/10 - no pain  -CW     Posttreatment Pain Rating 0/10 - no pain  -CW       Row Name 09/06/24 1533          Plan of Care Review    Plan of Care Reviewed With patient  -CW     Outcome Evaluation Pt participated with PT this PM. Pt up in chair at arrival and agreeable to mobilize. He completed STS with min A and ambulated 15' to the bathroom min A with walker. Pt became SOA and fatigued after using commode, chair brought up closer to bathroom for safety. Vital signs monitored and WFL throughout. PT continues to recommend SNF at d/c.  -CW       Row Name 09/06/24 1533          Vital Signs    O2 Delivery Pre Treatment room air  -[facility] Name 09/06/24 1533          Positioning and Restraints    Pre-Treatment Position sitting in chair/recliner  -CW     Post Treatment Position chair  -CW     In Chair reclined;call light within reach;encouraged to call for assist;exit alarm on;notified  nsg;legs elevated  -CW               User Key  (r) = Recorded By, (t) = Taken By, (c) = Cosigned By      Initials Name Provider Type    Carlotta Perdomo PT Physical Therapist                   Outcome Measures       Row Name 09/06/24 1535          How much help from another person do you currently need...    Turning from your back to your side while in flat bed without using bedrails? 3  -CW     Moving from lying on back to sitting on the side of a flat bed without bedrails? 3  -CW     Moving to and from a bed to a chair (including a wheelchair)? 3  -CW     Standing up from a chair using your arms (e.g., wheelchair, bedside chair)? 3  -CW     Climbing 3-5 steps with a railing? 2  -CW     To walk in hospital room? 3  -CW     AM-PAC 6 Clicks Score (PT) 17  -CW     Highest Level of Mobility Goal 5 --> Static standing  -CW       Row Name 09/06/24 1535          Functional Assessment    Outcome Measure Options AM-PAC 6 Clicks Basic Mobility (PT)  -CW               User Key  (r) = Recorded By, (t) = Taken By, (c) = Cosigned By      Initials Name Provider Type    Carlotta Perdomo PT Physical Therapist                                 Physical Therapy Education       Title: PT OT SLP Therapies (In Progress)       Topic: Physical Therapy (In Progress)       Point: Mobility training (Done)       Learning Progress Summary             Patient Acceptance, E, VU by SF at 9/5/2024 1531    Acceptance, E,TB, VU by  at 9/5/2024 0513    Acceptance, E, VU,NR by SF at 9/4/2024 1424                         Point: Home exercise program (Not Started)       Learner Progress:  Not documented in this visit.              Point: Body mechanics (Not Started)       Learner Progress:  Not documented in this visit.              Point: Precautions (Not Started)       Learner Progress:  Not documented in this visit.                              User Key       Initials Effective Dates Name Provider Type Discipline    [facility] 06/16/21 -  Antonio  Nargis, RN Registered Nurse Nurse     08/08/24 -  Heike Kay, PT Student PT Student PT                  PT Recommendation and Plan     Plan of Care Reviewed With: patient  Outcome Evaluation: Pt participated with PT this PM. Pt up in chair at arrival and agreeable to mobilize. He completed STS with min A and ambulated 15' to the bathroom min A with walker. Pt became SOA and fatigued after using commode, chair brought up closer to bathroom for safety. Vital signs monitored and WFL throughout. PT continues to recommend SNF at d/c.     Time Calculation:         PT Charges       Row Name 09/06/24 1529             Time Calculation    Start Time 1410  -CW      Stop Time 1428  -CW      Time Calculation (min) 18 min  -CW      PT Received On 09/06/24  -CW      PT - Next Appointment 09/09/24  -CW                User Key  (r) = Recorded By, (t) = Taken By, (c) = Cosigned By      Initials Name Provider Type    CW Carlotta Go, PT Physical Therapist                  Therapy Charges for Today       Code Description Service Date Service Provider Modifiers Qty    [id number]  PT THERAPEUTIC ACT EA 15 MIN 9/6/2024 Carlotta Go, PT GP 1            PT G-Codes  Outcome Measure Options: AM-PAC 6 Clicks Basic Mobility (PT)  AM-PAC 6 Clicks Score (PT): 17  AM-PAC 6 Clicks Score (OT): 17  Modified Keren Scale: 4 - Moderately severe disability.  Unable to walk without assistance, and unable to attend to own bodily needs without assistance.  PT Discharge Summary  Anticipated Discharge Disposition (PT): skilled nursing facility    Carlotta Go PT  9/6/2024

## 2024-09-06 NOTE — CASE MANAGEMENT/SOCIAL WORK
Continued Stay Note  [facility]     Patient Name: Carlito Mo  MRN: [id number]  Today's Date: 9/6/2024    Admit Date: 9/3/2024    Plan: [facility] accepted and HD chair available on Tuesday pending the results of  Hep B Total Core and Hep B Surface antibody.   Discharge Plan       Row Name 09/06/24 1115       Plan    Plan [facility] accepted and HD chair available on Tuesday pending the results of  Hep B Total Core and Hep B Surface antibody.    Plan Comments Need Nephrology to please order a Hep B Total Core and Hep B Surface antibody per Jean-Paul[facility] anjum request.      Row Name 09/06/24 0932       Plan    Plan [facility] pending precert;  Will need transport at d/c    Plan Comments Pt is agreeable to going to rehab at [facility] at d/c.  anjum Jeong has accepted.  Pt receives Hemodialysis on TTS schedule.  Packet in Pt folder on [facility].  Pharmacy for rehab updated ([facility]).  Will need transport.  Sent message to Keny VELASQUEZ to have precert initiated this morning...............Ame BERNAL/ASIA                   Discharge Codes    No documentation.                 Expected Discharge Date and Time       Expected Discharge Date Expected Discharge Time    Sep 7, 2024               Ame Cardoza RN

## 2024-09-06 NOTE — THERAPY TREATMENT NOTE
Patient Name: Carlito Mo  : 1955    MRN: [id number]                              Today's Date: 2024       Admit Date: 9/3/2024    Visit Dx:     ICD-10-CM ICD-9-CM   1. Ataxia  R27.0 781.3     Patient Active Problem List   Diagnosis    Major depressive disorder with single episode, in partial remission    Other hyperlipidemia    Type 2 diabetes mellitus, with long-term current use of insulin    Vitamin D deficiency    Erectile dysfunction    Chronic fatigue    Type 2 diabetes mellitus with ophthalmic complication    Benign prostatic hyperplasia with nocturia    History of basal cell carcinoma    Acquired hypothyroidism    Recurrent strokes    Suspected sleep apnea    YAW (obstructive sleep apnea)    Coronary artery disease involving native coronary artery of native heart without angina pectoris    Chronically elevated troponin    Colon cancer screening    Enlarged lymph nodes    Functional gait abnormality    History of myocardial infarction    Insomnia    Iron deficiency anemia    Major depression, recurrent    Essential hypertension    Acute on chronic renal insufficiency    Falls frequently    Acute on chronic anemia    Neurogenic orthostatic hypotension    Anemia, chronic disease    History of colon polyps    Helicobacter pylori gastritis    Esophagitis    Embolic stroke    Patent foramen ovale    Cardiomyopathy    Diarrhea    Generalized weakness    Paroxysmal atrial fibrillation    Chronic anticoagulation    Acute ischemic stroke    History of stroke    Iron deficiency anemia    Acute HFrEF (heart failure with reduced ejection fraction)    ESRD (end stage renal disease)    Hemoptysis    Chronic HFrEF (heart failure with reduced ejection fraction)    Hemodialysis patient    Exertional dyspnea    Severe malnutrition    Ataxia    Cerebellar stroke, acute    Medically noncompliant     Past Medical History:   Diagnosis Date    Acquired hypothyroidism     Acute sinusitis     SYLVAIN (acute kidney injury)      on CKD    Anemia     Arthritis     Atrial fibrillation     CAD (coronary artery disease)     Chronic combined systolic and diastolic congestive heart failure     COPD (chronic obstructive pulmonary disease)     Depression     Diabetic neuropathy 04/11/2022    Diabetes mellitus due to underlying condition    Esophagitis 06/10/2020    Added automatically from request for surgery [id number]      ESRD (end stage renal disease) 12/01/2023    Frequent PVCs     Generalized weakness     GERD (gastroesophageal reflux disease)     Helicobacter pylori gastritis 06/04/2020    Hyperlipidemia     Hypertension     Hypertensive encephalopathy     Pleural effusion     Pneumonia     Poor historian     RBBB (right bundle branch block)     Stroke     POOR VISION    TIA (transient ischemic attack)     Type 2 diabetes mellitus     Urinary retention     Vitamin D deficiency      Past Surgical History:   Procedure Laterality Date    APPENDECTOMY N/A 02/14/2016    Dr. Alexey Dodson    ARTERIOVENOUS FISTULA/SHUNT SURGERY Right 06/03/2022    Procedure: RIGHT FOREARM ARTERIOVENOUS FISTULA PLACEMENT RADIOCEPHALIC WITH CEPHALIC VEIN TRANSPOSITION;  Surgeon: Eliseo Willis MD;  Location: [facility] OR;  Service: Vascular;  Laterality: Right;    COLONOSCOPY      over 20 years ago.  no polyps     COLONOSCOPY N/A 09/18/2018    Procedure: COLONOSCOPY;  Surgeon: Jose Enrique Louie MD;  Location: [facility] ENDOSCOPY;  Service: Gastroenterology    COLONOSCOPY N/A 09/19/2018    IH, EH, polyps (TAs w/low grade dysplasia)    COLONOSCOPY N/A 06/01/2020    Procedure: COLONOSCOPY;  Surgeon: Jose Enrique Louie MD;  Location: [facility] ENDOSCOPY;  Service: Gastroenterology;  Laterality: N/A;  Pre op-Anemia, Melena, History of Polyps  Post op-To Cecum/TI, Polyp, Poor Prep    CORONARY ARTERY BYPASS GRAFT  11/2001    ENDOSCOPY N/A 05/29/2020    Procedure: ESOPHAGOGASTRODUODENOSCOPY with biopsies;  Surgeon: Amanda Lovelace MD;  Location: [facility] ENDOSCOPY;   Service: Gastroenterology;  Laterality: N/A;  pre-anemia, dark stools  post-esophagitis, hiatal hernia    ENDOSCOPY N/A 09/15/2020    Procedure: ESOPHAGOGASTRODUODENOSCOPY WITH BIOPSIES;  Surgeon: Jose Enrique Louie MD;  Location: [facility] ENDOSCOPY;  Service: Gastroenterology;  Laterality: N/A;  pre: history of melena and esophagitis  post: mild esophagitis and gastritis, small hiatal hernia    ENDOSCOPY N/A 12/4/2023    Procedure: ESOPHAGOGASTRODUODENOSCOPY with bx;  Surgeon: Quoc Elmore MD;  Location: [facility] ENDOSCOPY;  Service: Gastroenterology;  Laterality: N/A;  pre: Coffee-ground emesis  post: hiatal hernia, esophagitis    HERNIA REPAIR Left 12/05/2019    THORACENTESIS      TONSILLECTOMY      VASECTOMY        General Information       [facility] Name 09/06/24 1609          OT Time and Intention    Document Type therapy note (daily note)  -     Mode of Treatment occupational therapy  -[facility] Name 09/06/24 1609          General Information    Patient Profile Reviewed yes  -     Existing Precautions/Restrictions fall  -[facility] Name 09/06/24 1609          Cognition    Orientation Status (Cognition) oriented x 3  -[facility] Name 09/06/24 1609          Safety Issues, Functional Mobility    Impairments Affecting Function (Mobility) balance;endurance/activity tolerance;strength;shortness of breath  -               User Key  (r) = Recorded By, (t) = Taken By, (c) = Cosigned By      Initials Name Provider Type     Gaby Cyr OT Occupational Therapist                     Mobility/ADL's       [facility] Name 09/06/24 1612          Bed Mobility    Comment, (Bed Mobility) UIC  -[facility] Name 09/06/24 1612          Transfers    Transfers sit-stand transfer;toilet transfer  -[facility] Name 09/06/24 1612          Sit-Stand Transfer    Sit-Stand [place] (Transfers) minimum assist (75% patient effort);1 person assist;verbal cues  -     Assistive Device (Sit-Stand Transfers) walker,  front-wheeled  -       Row Name 09/06/24 1612          Toilet Transfer    Type (Toilet Transfer) sit-stand;stand-sit  -     Sw[place] Level (Toilet Transfer) minimum assist (75% patient effort);verbal cues  -     Assistive Device (Toilet Transfer) grab bars/safety frame;commode;raised toilet seat  -[facility] Name 09/06/24 1612          Functional Mobility    Functional Mobility- Ind. Level minimum assist (75% patient effort);contact guard assist  -     Functional Mobility- Comment to bathroom, chair needed to be pulled to bathroom door d/t dec act tolerance  -       Row Name 09/06/24 1612          Activities of Daily Living    BADL Assessment/Intervention toileting  -[facility] Name 09/06/24 1612          Toileting Assessment/Training    Swisher Level (Toileting) maximum assist (25% patient effort)  -     Assistive Devices (Toileting) commode;grab bar/safety frame;raised toilet seat  -     Position (Toileting) supported standing;supported sitting  -     Comment, (Toileting) assist for thoroughness for moiz hygiene as he fatigues quickly. dec balance to be able to complete clothing mgt  -               User Key  (r) = Recorded By, (t) = Taken By, (c) = Cosigned By      Initials Name Provider Type    Gaby Rai OT Occupational Therapist                   Obj/Interventions       [facility] Name 09/06/24 1614          Balance    Static Standing Balance contact guard;minimal assist  -     Dynamic Standing Balance minimal assist  -     Position/Device Used, Standing Balance supported;walker, front-wheeled  -     Balance Interventions sitting;standing;occupation based/functional task  -               User Key  (r) = Recorded By, (t) = Taken By, (c) = Cosigned By      Initials Name Provider Type    Gaby Rai OT Occupational Therapist                   Goals/Plan    No documentation.                  Clinical Impression       [facility] Name 09/06/24 1614          Pain Assessment     Pretreatment Pain Rating 0/10 - no pain  -     Posttreatment Pain Rating 0/10 - no pain  -       Row Name 09/06/24 1614          Plan of Care Review    Plan of Care Reviewed With patient  -     Outcome Evaluation Pt was found sitting UIC, agreeable to ax. He ambulates to the bathroom with Maynor using RW, toilet TF with BSC over toilet for elevated height. He requires increased assist for thoroughness after BM as he is quick to fatigue (maxA) and recliner pulled to BR door d/t poor act tolerance. Vitals monitored as pt demo's DE LEON. OT will cont to follow, recommend d/c SNF  -       Row Name 09/06/24 1614          Positioning and Restraints    Pre-Treatment Position sitting in chair/recliner  -     Post Treatment Position chair  -JW     In Chair notified nsg;sitting;call light within reach;encouraged to call for assist;exit alarm on;legs elevated  -               User Key  (r) = Recorded By, (t) = Taken By, (c) = Cosigned By      Initials Name Provider Type    Gaby Rai OT Occupational Therapist                   Outcome Measures       Row Name 09/06/24 1535          How much help from another person do you currently need...    Turning from your back to your side while in flat bed without using bedrails? 3  -CW     Moving from lying on back to sitting on the side of a flat bed without bedrails? 3  -CW     Moving to and from a bed to a chair (including a wheelchair)? 3  -CW     Standing up from a chair using your arms (e.g., wheelchair, bedside chair)? 3  -CW     Climbing 3-5 steps with a railing? 2  -CW     To walk in hospital room? 3  -CW     AM-PAC 6 Clicks Score (PT) 17  -CW     Highest Level of Mobility Goal 5 --> Static standing  -CW       Row Name 09/06/24 1535          Functional Assessment    Outcome Measure Options AM-PAC 6 Clicks Basic Mobility (PT)  -CW               User Key  (r) = Recorded By, (t) = Taken By, (c) = Cosigned By      Initials Name Provider Type    Carlotta Perdomo, PT  Physical Therapist                    Occupational Therapy Education       Title: PT OT SLP Therapies (In Progress)       Topic: Occupational Therapy (In Progress)       Point: ADL training (Done)       Description:   Instruct learner(s) on proper safety adaptation and remediation techniques during self care or transfers.   Instruct in proper use of assistive devices.                  Learning Progress Summary             Patient Acceptance, E,TB, VU by EI at 9/5/2024 0513    Acceptance, E, VU by  at 9/4/2024 1329    Comment: OT goals/POC                         Point: Home exercise program (Not Started)       Description:   Instruct learner(s) on appropriate technique for monitoring, assisting and/or progressing therapeutic exercises/activities.                  Learner Progress:  Not documented in this visit.              Point: Precautions (Done)       Description:   Instruct learner(s) on prescribed precautions during self-care and functional transfers.                  Learning Progress Summary             Patient Acceptance, E,TB, VU by EI at 9/5/2024 0513                         Point: Body mechanics (Not Started)       Description:   Instruct learner(s) on proper positioning and spine alignment during self-care, functional mobility activities and/or exercises.                  Learner Progress:  Not documented in this visit.                              User Key       Initials Effective Dates Name Provider Type Discipline    EI 06/16/21 -  Nargis Alvarez, RN Registered Nurse Nurse     04/02/20 -  Amanda Martinez OT Occupational Therapist OT                  OT Recommendation and Plan     Plan of Care Review  Plan of Care Reviewed With: patient  Outcome Evaluation: Pt was found sitting UIC, agreeable to ax. He ambulates to the bathroom with Maynor using RW, toilet TF with BSC over toilet for elevated height. He requires increased assist for thoroughness after BM as he is quick to fatigue (maxA) and  recliner pulled to BR door d/t poor act tolerance. Vitals monitored as pt demo's DE LEON. OT will cont to follow, recommend d/c SNF     Time Calculation:         Time Calculation- OT       Row Name 09/06/24 1618             Time Calculation- OT    OT Start Time 1410  -JW      OT Stop Time 1427  -JW      OT Time Calculation (min) 17 min  -      Total Timed Code Minutes- OT 17 minute(s)  -JW      OT Received On 09/06/24  -      OT - Next Appointment 09/09/24  -         Timed Charges    35749 - OT Self Care/Mgmt Minutes 17  -JW         Total Minutes    Timed Charges Total Minutes 17  -       Total Minutes 17  -                User Key  (r) = Recorded By, (t) = Taken By, (c) = Cosigned By      Initials Name Provider Type    Gaby Rai OT Occupational Therapist                  Therapy Charges for Today       Code Description Service Date Service Provider Modifiers Qty    [id number] HC OT SELF CARE/MGMT/TRAIN EA 15 MIN 9/6/2024 Gaby Cyr OT GO 1                 Gaby Cyr OT  9/6/2024

## 2024-09-06 NOTE — PROGRESS NOTES
Name: Carlito Mo ADMIT: 9/3/2024   : 1955  PCP: Belle Simons APRN    MRN: [id number] LOS: 3 days   AGE/SEX: 69 y.o. male  ROOM: [facility]     Subjective   Speech improved. Tolerating regular diet.     Objective   Objective   Vital Signs  Temp:  [97.5 °F (36.4 °C)-98.9 °F (37.2 °C)] 98 °F (36.7 °C)  Heart Rate:  [68-76] 74  Resp:  [16-18] 16  BP: (117-149)/(66-71) 117/66  SpO2:  [98 %-100 %] 100 %  on   ;   Device (Oxygen Therapy): room air  Body mass index is 17.97 kg/m².  Physical Exam  Constitutional:       General: He is not in acute distress.  HENT:      Head: Normocephalic and atraumatic.   Cardiovascular:      Rate and Rhythm: Normal rate. Rhythm irregular.   Pulmonary:      Effort: Pulmonary effort is normal. No respiratory distress.   Abdominal:      General: There is no distension.      Palpations: Abdomen is soft.      Tenderness: There is no abdominal tenderness.   Neurological:      Mental Status: He is alert and oriented to person, place, and time.      Comments: Word finding difficulties          Results Review     I reviewed the patient's new clinical results.  Results from last 7 days   Lab Units 24  04224   WBC 10*3/mm3 4.33 5.96 6.67   HEMOGLOBIN g/dL 9.7* 10.3* 11.5*   PLATELETS 10*3/mm3 257 259 293     Results from last 7 days   Lab Units 24  0510 24  04224   SODIUM mmol/L 137 135* 137 138   POTASSIUM mmol/L 4.0 4.0 4.0 3.9   CHLORIDE mmol/L 99 98 97* 97*   CO2 mmol/L 28.4 27.0 28.0 29.3*   BUN mg/dL 32* 51* 38* 26*   CREATININE mg/dL 3.10* 3.99* 3.13* 2.48*   GLUCOSE mg/dL 131* 124* 185* 232*   Estimated Creatinine Clearance: 19.1 mL/min (A) (by C-G formula based on SCr of 3.1 mg/dL (H)).  Results from last 7 days   Lab Units 24  0510 24  0420 24  0416 24   ALBUMIN g/dL 3.8 3.9 4.1 4.5   BILIRUBIN mg/dL  --   --  0.2 0.2   ALK PHOS U/L  --   --  84 86   AST (SGOT) U/L  --   --   11 15   ALT (SGPT) U/L  --   --  11 15     Results from last 7 days   Lab Units 09/06/24  0510 09/05/24  0420 09/04/24  0416 09/03/24 2010   CALCIUM mg/dL 8.9 9.1 9.1 9.3   ALBUMIN g/dL 3.8 3.9 4.1 4.5   PHOSPHORUS mg/dL 4.0 4.7*  --   --        COVID19   Date Value Ref Range Status   05/23/2024 Not Detected Not Detected - Ref. Range Final   01/04/2024 Not Detected Not Detected - Ref. Range Final     Hemoglobin A1C   Date/Time Value Ref Range Status   09/04/2024 0416 7.40 (H) 4.80 - 5.60 % Final     Glucose   Date/Time Value Ref Range Status   09/06/2024 1100 236 (H) 70 - 130 mg/dL Final   09/06/2024 0610 157 (H) 70 - 130 mg/dL Final   09/06/2024 0123 141 (H) 70 - 130 mg/dL Final   09/05/2024 1812 191 (H) 70 - 130 mg/dL Final   09/05/2024 1113 203 (H) 70 - 130 mg/dL Final   09/05/2024 0619 123 70 - 130 mg/dL Final   09/04/2024 2246 82 70 - 130 mg/dL Final     Results for orders placed or performed during the hospital encounter of 09/19/18   Blood Culture - Blood,    Specimen: Hand, Left; Blood   Result Value Ref Range    Blood Culture No growth at 5 days          FL Video Swallow Single Contrast  VIDEO SWALLOWING EXAMINATION BY SPEECH PATHOLOGY     Clinical: Dysphasia     Video swallowing examination performed under the direction of speech  pathology. Imaging reviewed by radiologist who concurs with the  findings.     Speech pathology summary:  VFSS completed, Rachel WORKMAN present.  Patient presents with moderate oropharyngeal dysphagia. Patient  demonstrates decreased hyolaryngeal excursion/elevation, reduced base of  tongue retraction, generalized pharyngeal weakness. Patient demonstrates  premature spillage to the pyriforms before the swallow, penetration  before the swallow with thin via cup. Moderate residue pooling to the  pyriforms after the swallow. Patient demonstrates no penetration or  aspiration initial sip of thin via straw, incomplete swallow and  attempts to clear pharyngeal residue. Patient  demonstrates premature  spillage of nectar to the vallecula, mild to moderate residue pooling to  the piriforms. Able to partially clear nectar residue with repeat  swallow. Patient demonstrates premature spillage to the vallecula puree  bolus, moderate to severe diffuse pharyngeal residue after the swallow.  Premature spillage to mechanical soft to the pyriforms, mod to severe  residue after the swallow. Minimal clearance pur?e and soft solid  residue with double swallow, improved with liquid wash. Tongue pumping  with regular texture, mild to moderate pharyngeal residue after the  swallow. Independent use of thin liquid wash, silent aspiration thin.        FLUOROSCOPY TIME: 2 minutes 58 seconds, 4974 images.     This report was finalized on 9/5/2024 3:42 PM by Dr. Bryan Burnham M.D  on Workstation: [id number]     CT Head Without Contrast  Narrative: EMERGENCY CT SCAN OF THE HEAD WITHOUT CONTRAST 09/04/2024     CLINICAL HISTORY: Ataxia and dizziness.     TECHNIQUE: Spiral CT images were obtained from the base of the skull to  the vertex without intravenous contrast. The images were reformatted and  are submitted in 3 mm thick axial, sagittal and coronal CT sections with  brain algorithm.     This is correlated to a prior head CT from [facility] on  01/04/2024 and a prior MRI of the brain on 09/17/2022.     FINDINGS: There are extensive patchy and confluent areas of low-density  throughout the cerebral white matter consistent with severe small vessel  disease. There is a chronic area of encephalomalacia in the superior  left parietal cortex measuring 2.5 x 1.8 cm in size compatible with an  old superior left parietal cortical infarct, unchanged dating back to  MRI of the brain 09/17/2022. It is in the left MCA territory.  Furthermore there is a 2.7 x 1.3 cm chronic infarct in the superomedial  right cerebellum in the right superior cerebellar artery territory and  there is a 18 x 5 mm chronic infarct  in the posterolateral left  cerebellum in the left PICA territory, unchanged since MRI of the brain  09/17/2022. There are tiny old lacunar infarcts in the thalami  bilaterally unchanged. There is diffuse cerebral atrophy. The ventricles  are normal in size. I see no mass effect and no midline shift and no  extra-axial fluid collections are identified and there is no evidence of  acute intracranial hemorrhage. There is a 2.5 cm mucous retention cyst  in the posterior right maxillary sinus. The remainder of the paranasal  sinuses, mastoid air cells and middle ear cavities are clear. The  calvarium and skull base are normal in appearance. There is a chronic  sebaceous cyst in the posterior superior left parietal scalp measuring  18 x 14 mm in size.     Impression: 1. No convincing acute intracranial abnormality is identified with no  discernible change when compared to prior MRI of the brain on  09/17/2022.     2. There is severe small vessel disease in the cerebral white matter.  There is a 2.5 x 1.8 cm old superior left parietal infarct in the left  MCA territory. There is a 2.7 x 1.3 cm old superomedial right cerebellar  infarct in the right superior cerebellar artery territory and there is  an 18 x 5 mm old posterolateral left cerebellar infarct in the left PICA  territory and there are small old lacunar infarcts in the thalami  bilaterally. There is diffuse cerebral atrophy.     3. There is a 2.5 cm mucous retention cyst in the posterior right  maxillary sinus and there is a sebaceous cyst in the posterior superior  left parietal scalp measuring 18 x 14 x 19 mm in size. The remainder of  the head CT is normal. The etiology of the ataxia and dizziness is not  established on this exam and if there remains any clinical suspicion of  an acute infarct, I recommend an MRI of the brain for a more complete  assessment. The findings and recommendations were communicated to  Eliseo Johnson MD, by telephone on  09/04/2024 at 8:20 a.m.     Radiation dose reduction techniques were utilized, including automated  exposure control and exposure modulation based on body size.        This report was finalized on 9/5/2024 6:53 AM by Dr. Alexey Aldana M.D on  Workstation: [id number]     MRI Brain Without Contrast  Narrative: STAT MRI OF THE BRAIN WITHOUT CONTRAST ON 09/04/2024     CLINICAL HISTORY: This is a 69-year-old male patient who has a prior  history of stroke and has acute dizziness after dialysis on 09/03/2024,  evaluate for acute stroke.     TECHNIQUE: Axial T1, FLAIR, fat-suppressed T2, axial diffusion and  gradient echo T2 and sagittal T1-weighted images were obtained of the  entire head.     This is correlated to a prior MRI of the brain from [facility] on 09/17/2022.     FINDINGS: There is extensive patchy and confluent T2 high signal  throughout the periventricular and subcortical white matter of the  cerebral hemispheres compatible with severe small vessel disease. There  are tiny old lacunar infarcts in the mid right corona radiata region and  in the thalami bilaterally. There is a 2.5 cm old superior parietal  infarct in the left MCA territory. There is a 2.4 x 1.1 cm old superior  medial right cerebellar infarct and an additional 1.8 x 0.5 cm old  superior right vermian cerebellar infarct in the right superior  cerebellar artery territory. There is a 7 x 2 mm old posterior inferior  lateral right cerebellar infarct in the right PICA territory and there  is a 14 x 4 mm old posterior inferior lateral left cerebellar infarct in  the left PICA territory, and these are all unchanged since prior MRI  09/17/2022. There is an ovoid area of intense increased signal intensity  on the diffusion weighted images projecting over the mid left cerebellar  white matter that measures 5 mm in size. It is a little dark on the ADC  maps and bright on the FLAIR images and is compatible with a tiny acute  mid left  cerebellar white matter infarct. There is mild diffuse cerebral  atrophy.  The lateral and third ventricles are mildly prominent in size,  felt to be on the basis of central volume loss or atrophy. I see no mass  effect and no midline shift. No extra-axial fluid collections are  identified. Calvarium and skull base are normal in appearance. There is  a 2.5 cm mucous retention cyst in the posterior right maxillary sinus.  Otherwise, the paranasal sinuses and the mastoid air cells and the  middle ear cavities are clear. There are bilateral intraocular lens  implants in the globes from previous cataract surgery, otherwise the  orbits are normal in appearance. There is a tiny punctate 2 mm focus of  signal loss on the gradient echo T2 weighted images in the right lateral  malcolm, likely a punctate area of hemosiderin deposition due to an old  hypertensive microhemorrhage and there is a 4 mm rounded focus of signal  loss in the inferior medial left cerebellum on the gradient echo T2  weighted images compatible with an additional focus of hemosiderin  deposition probably secondary to old hypertensive microhemorrhages.   There is a cluster of nodular foci of signal loss in the right and left  thalami compatible with multiple punctate old microhemorrhages in the  thalami bilaterally likely secondary to hypertension.  There is a 5 mm  rounded focus of signal loss in the superior lateral right occipital  juxtacortical white matter compatible with a tiny old microhemorrhage at  this site and given this location it could be secondary to amyloid.      Impression: 1. Since the prior MRI of the brain on 09/17/2022 there has been  development of a 5 mm rounded diffusion hyperintense focus in the mid  left cerebellum.  This is most compatible with a tiny acute mid left  cerebellar white matter infarct. Otherwise, there has been no  significant change.     2. There is severe small vessel disease in the cerebral white matter.  There are  old lacunar infarcts in the mid right corona radiata region  and the thalami bilaterally. There are multiple old bilateral cerebellar  infarcts including a 2.4 x 1.1 cm old superior medial right cerebellar  infarct and an additional 1.8 x 0.5 cm old superior right cerebellar  vermian infarct and these are both in the right superior cerebellar  artery territory. There is an additional 7 x 2 mm old posterior inferior  lateral right cerebellar infarct in the right PICA territory and there  is a 14 x 4 mm old posterior inferior lateral left cerebellar infarct in  the left PICA territory.     3. There are multiple punctate foci of hemosiderin deposition from tiny  old microhemorrhages with 4 in the left thalamus and 4 in the right  thalamus, and a 2 mm focus in the right side of the malcolm and these are  good locations for old hypertensive microhemorrhages. There is a 4 mm  focus in the inferior medial left cerebellum and a 4-5 mm rounded focus  in the superior lateral right occipital juxtacortical white matter that  could be secondary to amyloid. There is diffuse cerebral atrophy. The  remainder of the MRI of the brain is normal. I communicated the results  to Eliseo Card MD, by telephone on 09/04/2024 at 11:50 a.m.     This report was finalized on 9/5/2024 6:37 AM by Dr. Alexey Aldana M.D on  Workstation: [id number]     CT Angiogram Head, CT Angiogram Neck  Narrative: NONCONTRAST CRANIAL CT SCAN, CT ANGIOGRAM NECK, CT ANGIOGRAM HEAD.     HISTORY: Acute CVA,      COMPARISON: None..     TECHNIQUE:  Radiation dose reduction techniques were utilized, including  automated exposure control and exposure modulation based on body size.   Initially, a routine noncontrast cranial CT performed from the skull  base through the vertex region. After review, thin-section contrast  enhanced CT angiogram images obtained from the aortic arch through the  calvarial vertex utilizing angiographic technique. Multi projection 3-D  MIP  reformatted images were supplemented and reviewed.        FINDINGS CRANIAL CT: No acute hemorrhage or midline shift is  demonstrated.. The patient has advanced atrophy for the age of 69. There  is periventricular and deep white matter microangiopathic change. Old  lacunar infarct is noted within the left thalamus. Postcontrast imaging  does not show any evidence of venous occlusion. No abnormal enhancement  is seen. Bone window images demonstrate a large mucous retention cyst  within the right maxillary sinus. Mucosal thickening is noted within the  ethmoid sinuses..     CERVICAL CAROTID CT ANGIOGRAM:     FINDINGS: There is normal arch anatomy. There is atherosclerotic  involvement of the aortic arch. Common carotid arteries are patent.  Calcified plaque is noted at the carotid bifurcations bilaterally.  Distal cervical internal carotid arteries are patent. Calcified plaque  is noted at the origins of the vertebral arteries bilaterally. The left  is slightly larger in caliber. There is severe stenosis at the origin of  the left vertebral artery. There is moderate narrowing at the origin of  the right vertebral artery. There is approximately 40 to 50% stenosis of  the proximal right internal carotid artery. Images through the lung  apices do not demonstrate any acute abnormalities. Thyroid gland and  trachea are within normal limits.        NASCET criteria utilized in stenosis measurements. stenosis mild, 0-49%.        CRANIAL CTA ANGIOGRAM:     FINDINGS: Intracranial vertebral arteries are patent. Basilar artery is  also patent. Both P1 segments are very hypoplastic. There are  well-developed posterior communicating arteries bilaterally. Posterior  cerebral arteries are patent. Intracranial internal carotid arteries are  patent. There is an anterior communicating artery. Right A1 is somewhat  hypoplastic. The middle cerebral and anterior cerebral arteries are  patent.             Radiation dose reduction techniques  were utilized, including automated  exposure control and exposure modulation based on body size.        Impression: 1. 1. No acute intracranial findings.  2. Severe narrowing at the origin of the left vertebral artery, and  moderate stenosis of the origin of the right vertebral artery.  3. Approximately 40 to 50% stenosis of the proximal right internal  carotid artery.  4. No intracranial stenosis or occlusion.        Radiation dose reduction techniques were utilized, including automated  exposure control and exposure modulation based on body size.        This report was finalized on 9/5/2024 4:34 AM by Dr. Alice Allen M.D on Workstation: [id number]       Scheduled Medications  apixaban, 2.5 mg, Oral, Q12H  aspirin, 325 mg, Oral, Daily   Or  aspirin, 300 mg, Rectal, Daily  atorvastatin, 80 mg, Oral, Nightly  budesonide-formoterol, 2 puff, Inhalation, BID - RT  insulin regular, 2-7 Units, Subcutaneous, Q6H  levothyroxine, 75 mcg, Oral, Q AM    Infusions   Diet  Diet: Regular/House; No Mixed Consistencies; Texture: Mechanical Ground (NDD 2); Fluid Consistency: Nectar Thick       Assessment/Plan     Active Hospital Problems    Diagnosis  POA    **Cerebellar stroke, acute [I63.9]  Yes    Medically noncompliant [Z91.199]  Not Applicable    Ataxia [R27.0]  Yes    Chronic HFrEF (heart failure with reduced ejection fraction) [I50.22]  Yes    ESRD (end stage renal disease) [N18.6]  Yes    Paroxysmal atrial fibrillation [I48.0]  Yes    Patent foramen ovale [Q21.12]  Not Applicable    Anemia, chronic disease [D63.8]  Yes    Essential hypertension [I10]  Yes    Acquired hypothyroidism [E03.9]  Yes    Type 2 diabetes mellitus, with long-term current use of insulin [E11.9, Z79.4]  Not Applicable      Resolved Hospital Problems   No resolved problems to display.       69 y.o. male admitted with Cerebellar stroke, acute.    Acute cerebral stroke  CT angiogram of the head and neck did not show any acute intracranial  findings however severe narrowing at the origin of the left vertebral artery and moderate stenosis of the origin of the right vertebral artery were noted.  Aspirin, statin  Gradually normalize BP  PT/OT/SLP    Paroxysmal atrial fibrillation  Patent Foramen ovale  Eliquis 5 mg twice daily    T2DM  SSI    Hypertension  Normalize BP    Hypothyroidism  On levothyroxine      Eliquis (home med) for DVT prophylaxis.  Full code.  Discussed with patient.  Anticipate discharge to SNF on Tuesday, 9/10/24      Clark Gonzalez MD  [facility]  09/06/24  13:15 EDT

## 2024-09-07 LAB
ALBUMIN SERPL-MCNC: 3.7 G/DL (ref 3.5–5.2)
ANION GAP SERPL CALCULATED.3IONS-SCNC: 11 MMOL/L (ref 5–15)
BUN SERPL-MCNC: 48 MG/DL (ref 8–23)
BUN/CREAT SERPL: 11.4 (ref 7–25)
CALCIUM SPEC-SCNC: 8.9 MG/DL (ref 8.6–10.5)
CHLORIDE SERPL-SCNC: 99 MMOL/L (ref 98–107)
CO2 SERPL-SCNC: 26 MMOL/L (ref 22–29)
CREAT SERPL-MCNC: 4.2 MG/DL (ref 0.76–1.27)
EGFRCR SERPLBLD CKD-EPI 2021: 14.6 ML/MIN/1.73
GLUCOSE BLDC GLUCOMTR-MCNC: 142 MG/DL (ref 70–130)
GLUCOSE BLDC GLUCOMTR-MCNC: 151 MG/DL (ref 70–130)
GLUCOSE BLDC GLUCOMTR-MCNC: 210 MG/DL (ref 70–130)
GLUCOSE BLDC GLUCOMTR-MCNC: 220 MG/DL (ref 70–130)
GLUCOSE SERPL-MCNC: 119 MG/DL (ref 65–99)
HBV CORE AB SERPL QL IA: NEGATIVE
PHOSPHATE SERPL-MCNC: 4 MG/DL (ref 2.5–4.5)
POTASSIUM SERPL-SCNC: 4.3 MMOL/L (ref 3.5–5.2)
SODIUM SERPL-SCNC: 136 MMOL/L (ref 136–145)

## 2024-09-07 PROCEDURE — 94799 UNLISTED PULMONARY SVC/PX: CPT

## 2024-09-07 PROCEDURE — 80069 RENAL FUNCTION PANEL: CPT | Performed by: INTERNAL MEDICINE

## 2024-09-07 PROCEDURE — 82948 REAGENT STRIP/BLOOD GLUCOSE: CPT

## 2024-09-07 PROCEDURE — 63710000001 INSULIN REGULAR HUMAN PER 5 UNITS: Performed by: HOSPITALIST

## 2024-09-07 PROCEDURE — 94761 N-INVAS EAR/PLS OXIMETRY MLT: CPT

## 2024-09-07 PROCEDURE — 94664 DEMO&/EVAL PT USE INHALER: CPT

## 2024-09-07 RX ADMIN — APIXABAN 2.5 MG: 2.5 TABLET, FILM COATED ORAL at 21:32

## 2024-09-07 RX ADMIN — INSULIN HUMAN 3 UNITS: 100 INJECTION, SOLUTION PARENTERAL at 11:27

## 2024-09-07 RX ADMIN — BUDESONIDE AND FORMOTEROL FUMARATE DIHYDRATE 2 PUFF: 160; 4.5 AEROSOL RESPIRATORY (INHALATION) at 08:52

## 2024-09-07 RX ADMIN — LEVOTHYROXINE SODIUM 75 MCG: 75 TABLET ORAL at 06:30

## 2024-09-07 RX ADMIN — ASPIRIN 325 MG: 325 TABLET ORAL at 09:33

## 2024-09-07 RX ADMIN — INSULIN HUMAN 2 UNITS: 100 INJECTION, SOLUTION PARENTERAL at 06:48

## 2024-09-07 RX ADMIN — BUDESONIDE AND FORMOTEROL FUMARATE DIHYDRATE 2 PUFF: 160; 4.5 AEROSOL RESPIRATORY (INHALATION) at 20:20

## 2024-09-07 RX ADMIN — INSULIN HUMAN 3 UNITS: 100 INJECTION, SOLUTION PARENTERAL at 18:26

## 2024-09-07 RX ADMIN — ATORVASTATIN CALCIUM 80 MG: 80 TABLET, FILM COATED ORAL at 21:31

## 2024-09-07 RX ADMIN — APIXABAN 2.5 MG: 2.5 TABLET, FILM COATED ORAL at 09:33

## 2024-09-07 NOTE — PROGRESS NOTES
Name: Carlito Mo ADMIT: 9/3/2024   : 1955  PCP: Belle Simons APRN    MRN: [id number] LOS: 4 days   AGE/SEX: 69 y.o. male  ROOM: [facility]     Subjective   Some expressive aphasia noted.  Overall speech improving.  He is tolerating diet.  Doesn't much care for the dysphagia diet.  Family at bedside.  He had BM today. Voiding without issues. Denies chest pain, nausea, vomiting, abd pain, cough, shoa.     Objective   Objective   Vital Signs  Temp:  [97.4 °F (36.3 °C)-98.1 °F (36.7 °C)] 97.6 °F (36.4 °C)  Heart Rate:  [64-75] 75  Resp:  [16-18] 18  BP: (125-138)/(62-73) 134/73  SpO2:  [99 %-100 %] 100 %  on   ;   Device (Oxygen Therapy): room air  Body mass index is 17.9 kg/m².      Physical Exam  Constitutional:       General: He is not in acute distress.  HENT:      Head: Normocephalic and atraumatic.   Eyes:      General: No scleral icterus.     Conjunctiva/sclera: Conjunctivae normal.   Cardiovascular:      Rate and Rhythm: Normal rate. Rhythm irregular.      Pulses: Normal pulses.      Heart sounds: Normal heart sounds.   Pulmonary:      Effort: Pulmonary effort is normal. No respiratory distress.      Breath sounds: Normal breath sounds.   Abdominal:      General: There is no distension.      Palpations: Abdomen is soft.      Tenderness: There is no abdominal tenderness.   Neurological:      General: No focal deficit present.      Mental Status: He is alert and oriented to person, place, and time. Mental status is at baseline.      Motor: No weakness.      Comments: Word finding difficulties    Psychiatric:      Comments: Flat affect           Results Review     I reviewed the patient's new clinical results.  Results from last 7 days   Lab Units 24  [id number] 24   WBC 10*3/mm3 4.33 5.96 6.67   HEMOGLOBIN g/dL 9.7* 10.3* 11.5*   PLATELETS 10*3/mm3 257 259 293     Results from last 7 days   Lab Units 24  0409 24  0510 24  0420 24  0416   SODIUM  mmol/L 136 137 135* 137   POTASSIUM mmol/L 4.3 4.0 4.0 4.0   CHLORIDE mmol/L 99 99 98 97*   CO2 mmol/L 26.0 28.4 27.0 28.0   BUN mg/dL 48* 32* 51* 38*   CREATININE mg/dL 4.20* 3.10* 3.99* 3.13*   GLUCOSE mg/dL 119* 131* 124* 185*   Estimated Creatinine Clearance: 14.1 mL/min (A) (by C-G formula based on SCr of 4.2 mg/dL (H)).  Results from last 7 days   Lab Units 09/07/24  0409 09/06/24  0510 09/05/24  0420 09/04/24  0416 09/03/24 2010   ALBUMIN g/dL 3.7 3.8 3.9 4.1 4.5   BILIRUBIN mg/dL  --   --   --  0.2 0.2   ALK PHOS U/L  --   --   --  84 86   AST (SGOT) U/L  --   --   --  11 15   ALT (SGPT) U/L  --   --   --  11 15     Results from last 7 days   Lab Units 09/07/24  0409 09/06/24  0510 09/05/24  0420 09/04/24  0416   CALCIUM mg/dL 8.9 8.9 9.1 9.1   ALBUMIN g/dL 3.7 3.8 3.9 4.1   PHOSPHORUS mg/dL 4.0 4.0 4.7*  --        COVID19   Date Value Ref Range Status   05/23/2024 Not Detected Not Detected - Ref. Range Final   01/04/2024 Not Detected Not Detected - Ref. Range Final     Glucose   Date/Time Value Ref Range Status   09/07/2024 1052 220 (H) 70 - 130 mg/dL Final   09/07/2024 0643 151 (H) 70 - 130 mg/dL Final   09/06/2024 2038 100 70 - 130 mg/dL Final   09/06/2024 1559 189 (H) 70 - 130 mg/dL Final   09/06/2024 1100 236 (H) 70 - 130 mg/dL Final   09/06/2024 0610 157 (H) 70 - 130 mg/dL Final   09/06/2024 0123 141 (H) 70 - 130 mg/dL Final     Results for orders placed or performed during the hospital encounter of 09/19/18   Blood Culture - Blood,    Specimen: Hand, Left; Blood   Result Value Ref Range    Blood Culture No growth at 5 days          FL Video Swallow Single Contrast  VIDEO SWALLOWING EXAMINATION BY SPEECH PATHOLOGY     Clinical: Dysphasia     Video swallowing examination performed under the direction of speech  pathology. Imaging reviewed by radiologist who concurs with the  findings.     Speech pathology summary:  VFSS completed, Rachel WORKMAN present.  Patient presents with moderate oropharyngeal  dysphagia. Patient  demonstrates decreased hyolaryngeal excursion/elevation, reduced base of  tongue retraction, generalized pharyngeal weakness. Patient demonstrates  premature spillage to the pyriforms before the swallow, penetration  before the swallow with thin via cup. Moderate residue pooling to the  pyriforms after the swallow. Patient demonstrates no penetration or  aspiration initial sip of thin via straw, incomplete swallow and  attempts to clear pharyngeal residue. Patient demonstrates premature  spillage of nectar to the vallecula, mild to moderate residue pooling to  the piriforms. Able to partially clear nectar residue with repeat  swallow. Patient demonstrates premature spillage to the vallecula puree  bolus, moderate to severe diffuse pharyngeal residue after the swallow.  Premature spillage to mechanical soft to the pyriforms, mod to severe  residue after the swallow. Minimal clearance pur?e and soft solid  residue with double swallow, improved with liquid wash. Tongue pumping  with regular texture, mild to moderate pharyngeal residue after the  swallow. Independent use of thin liquid wash, silent aspiration thin.        FLUOROSCOPY TIME: 2 minutes 58 seconds, 4974 images.     This report was finalized on 9/5/2024 3:42 PM by Dr. Bryan Burnham M.D  on Workstation: [id number]     CT Head Without Contrast  Narrative: EMERGENCY CT SCAN OF THE HEAD WITHOUT CONTRAST 09/04/2024     CLINICAL HISTORY: Ataxia and dizziness.     TECHNIQUE: Spiral CT images were obtained from the base of the skull to  the vertex without intravenous contrast. The images were reformatted and  are submitted in 3 mm thick axial, sagittal and coronal CT sections with  brain algorithm.     This is correlated to a prior head CT from [facility] on  01/04/2024 and a prior MRI of the brain on 09/17/2022.     FINDINGS: There are extensive patchy and confluent areas of low-density  throughout the cerebral white matter  consistent with severe small vessel  disease. There is a chronic area of encephalomalacia in the superior  left parietal cortex measuring 2.5 x 1.8 cm in size compatible with an  old superior left parietal cortical infarct, unchanged dating back to  MRI of the brain 09/17/2022. It is in the left MCA territory.  Furthermore there is a 2.7 x 1.3 cm chronic infarct in the superomedial  right cerebellum in the right superior cerebellar artery territory and  there is a 18 x 5 mm chronic infarct in the posterolateral left  cerebellum in the left PICA territory, unchanged since MRI of the brain  09/17/2022. There are tiny old lacunar infarcts in the thalami  bilaterally unchanged. There is diffuse cerebral atrophy. The ventricles  are normal in size. I see no mass effect and no midline shift and no  extra-axial fluid collections are identified and there is no evidence of  acute intracranial hemorrhage. There is a 2.5 cm mucous retention cyst  in the posterior right maxillary sinus. The remainder of the paranasal  sinuses, mastoid air cells and middle ear cavities are clear. The  calvarium and skull base are normal in appearance. There is a chronic  sebaceous cyst in the posterior superior left parietal scalp measuring  18 x 14 mm in size.     Impression: 1. No convincing acute intracranial abnormality is identified with no  discernible change when compared to prior MRI of the brain on  09/17/2022.     2. There is severe small vessel disease in the cerebral white matter.  There is a 2.5 x 1.8 cm old superior left parietal infarct in the left  MCA territory. There is a 2.7 x 1.3 cm old superomedial right cerebellar  infarct in the right superior cerebellar artery territory and there is  an 18 x 5 mm old posterolateral left cerebellar infarct in the left PICA  territory and there are small old lacunar infarcts in the thalami  bilaterally. There is diffuse cerebral atrophy.     3. There is a 2.5 cm mucous retention cyst in the  posterior right  maxillary sinus and there is a sebaceous cyst in the posterior superior  left parietal scalp measuring 18 x 14 x 19 mm in size. The remainder of  the head CT is normal. The etiology of the ataxia and dizziness is not  established on this exam and if there remains any clinical suspicion of  an acute infarct, I recommend an MRI of the brain for a more complete  assessment. The findings and recommendations were communicated to  Eliseo Johnson MD, by telephone on 09/04/2024 at 8:20 a.m.     Radiation dose reduction techniques were utilized, including automated  exposure control and exposure modulation based on body size.        This report was finalized on 9/5/2024 6:53 AM by Dr. Alexey Aldana M.D on  Workstation: [id number]     MRI Brain Without Contrast  Narrative: STAT MRI OF THE BRAIN WITHOUT CONTRAST ON 09/04/2024     CLINICAL HISTORY: This is a 69-year-old male patient who has a prior  history of stroke and has acute dizziness after dialysis on 09/03/2024,  evaluate for acute stroke.     TECHNIQUE: Axial T1, FLAIR, fat-suppressed T2, axial diffusion and  gradient echo T2 and sagittal T1-weighted images were obtained of the  entire head.     This is correlated to a prior MRI of the brain from [facility] on 09/17/2022.     FINDINGS: There is extensive patchy and confluent T2 high signal  throughout the periventricular and subcortical white matter of the  cerebral hemispheres compatible with severe small vessel disease. There  are tiny old lacunar infarcts in the mid right corona radiata region and  in the thalami bilaterally. There is a 2.5 cm old superior parietal  infarct in the left MCA territory. There is a 2.4 x 1.1 cm old superior  medial right cerebellar infarct and an additional 1.8 x 0.5 cm old  superior right vermian cerebellar infarct in the right superior  cerebellar artery territory. There is a 7 x 2 mm old posterior inferior  lateral right cerebellar infarct in  the right PICA territory and there  is a 14 x 4 mm old posterior inferior lateral left cerebellar infarct in  the left PICA territory, and these are all unchanged since prior MRI  09/17/2022. There is an ovoid area of intense increased signal intensity  on the diffusion weighted images projecting over the mid left cerebellar  white matter that measures 5 mm in size. It is a little dark on the ADC  maps and bright on the FLAIR images and is compatible with a tiny acute  mid left cerebellar white matter infarct. There is mild diffuse cerebral  atrophy.  The lateral and third ventricles are mildly prominent in size,  felt to be on the basis of central volume loss or atrophy. I see no mass  effect and no midline shift. No extra-axial fluid collections are  identified. Calvarium and skull base are normal in appearance. There is  a 2.5 cm mucous retention cyst in the posterior right maxillary sinus.  Otherwise, the paranasal sinuses and the mastoid air cells and the  middle ear cavities are clear. There are bilateral intraocular lens  implants in the globes from previous cataract surgery, otherwise the  orbits are normal in appearance. There is a tiny punctate 2 mm focus of  signal loss on the gradient echo T2 weighted images in the right lateral  malcolm, likely a punctate area of hemosiderin deposition due to an old  hypertensive microhemorrhage and there is a 4 mm rounded focus of signal  loss in the inferior medial left cerebellum on the gradient echo T2  weighted images compatible with an additional focus of hemosiderin  deposition probably secondary to old hypertensive microhemorrhages.   There is a cluster of nodular foci of signal loss in the right and left  thalami compatible with multiple punctate old microhemorrhages in the  thalami bilaterally likely secondary to hypertension.  There is a 5 mm  rounded focus of signal loss in the superior lateral right occipital  juxtacortical white matter compatible with a tiny  old microhemorrhage at  this site and given this location it could be secondary to amyloid.      Impression: 1. Since the prior MRI of the brain on 09/17/2022 there has been  development of a 5 mm rounded diffusion hyperintense focus in the mid  left cerebellum.  This is most compatible with a tiny acute mid left  cerebellar white matter infarct. Otherwise, there has been no  significant change.     2. There is severe small vessel disease in the cerebral white matter.  There are old lacunar infarcts in the mid right corona radiata region  and the thalami bilaterally. There are multiple old bilateral cerebellar  infarcts including a 2.4 x 1.1 cm old superior medial right cerebellar  infarct and an additional 1.8 x 0.5 cm old superior right cerebellar  vermian infarct and these are both in the right superior cerebellar  artery territory. There is an additional 7 x 2 mm old posterior inferior  lateral right cerebellar infarct in the right PICA territory and there  is a 14 x 4 mm old posterior inferior lateral left cerebellar infarct in  the left PICA territory.     3. There are multiple punctate foci of hemosiderin deposition from tiny  old microhemorrhages with 4 in the left thalamus and 4 in the right  thalamus, and a 2 mm focus in the right side of the malcolm and these are  good locations for old hypertensive microhemorrhages. There is a 4 mm  focus in the inferior medial left cerebellum and a 4-5 mm rounded focus  in the superior lateral right occipital juxtacortical white matter that  could be secondary to amyloid. There is diffuse cerebral atrophy. The  remainder of the MRI of the brain is normal. I communicated the results  to Eliseo Card MD, by telephone on 09/04/2024 at 11:50 a.m.     This report was finalized on 9/5/2024 6:37 AM by Dr. Alexey Aldana M.D on  Workstation: [id number]     CT Angiogram Head, CT Angiogram Neck  Narrative: NONCONTRAST CRANIAL CT SCAN, CT ANGIOGRAM NECK, CT ANGIOGRAM HEAD.      HISTORY: Acute CVA,      COMPARISON: None..     TECHNIQUE:  Radiation dose reduction techniques were utilized, including  automated exposure control and exposure modulation based on body size.   Initially, a routine noncontrast cranial CT performed from the skull  base through the vertex region. After review, thin-section contrast  enhanced CT angiogram images obtained from the aortic arch through the  calvarial vertex utilizing angiographic technique. Multi projection 3-D  MIP reformatted images were supplemented and reviewed.        FINDINGS CRANIAL CT: No acute hemorrhage or midline shift is  demonstrated.. The patient has advanced atrophy for the age of 69. There  is periventricular and deep white matter microangiopathic change. Old  lacunar infarct is noted within the left thalamus. Postcontrast imaging  does not show any evidence of venous occlusion. No abnormal enhancement  is seen. Bone window images demonstrate a large mucous retention cyst  within the right maxillary sinus. Mucosal thickening is noted within the  ethmoid sinuses..     CERVICAL CAROTID CT ANGIOGRAM:     FINDINGS: There is normal arch anatomy. There is atherosclerotic  involvement of the aortic arch. Common carotid arteries are patent.  Calcified plaque is noted at the carotid bifurcations bilaterally.  Distal cervical internal carotid arteries are patent. Calcified plaque  is noted at the origins of the vertebral arteries bilaterally. The left  is slightly larger in caliber. There is severe stenosis at the origin of  the left vertebral artery. There is moderate narrowing at the origin of  the right vertebral artery. There is approximately 40 to 50% stenosis of  the proximal right internal carotid artery. Images through the lung  apices do not demonstrate any acute abnormalities. Thyroid gland and  trachea are within normal limits.        NASCET criteria utilized in stenosis measurements. stenosis mild, 0-49%.        CRANIAL CTA  ANGIOGRAM:     FINDINGS: Intracranial vertebral arteries are patent. Basilar artery is  also patent. Both P1 segments are very hypoplastic. There are  well-developed posterior communicating arteries bilaterally. Posterior  cerebral arteries are patent. Intracranial internal carotid arteries are  patent. There is an anterior communicating artery. Right A1 is somewhat  hypoplastic. The middle cerebral and anterior cerebral arteries are  patent.             Radiation dose reduction techniques were utilized, including automated  exposure control and exposure modulation based on body size.        Impression: 1. 1. No acute intracranial findings.  2. Severe narrowing at the origin of the left vertebral artery, and  moderate stenosis of the origin of the right vertebral artery.  3. Approximately 40 to 50% stenosis of the proximal right internal  carotid artery.  4. No intracranial stenosis or occlusion.        Radiation dose reduction techniques were utilized, including automated  exposure control and exposure modulation based on body size.        This report was finalized on 9/5/2024 4:34 AM by Dr. Alice Allen M.D on Workstation: [id number]       Scheduled Medications  apixaban, 2.5 mg, Oral, Q12H  aspirin, 325 mg, Oral, Daily   Or  aspirin, 300 mg, Rectal, Daily  atorvastatin, 80 mg, Oral, Nightly  budesonide-formoterol, 2 puff, Inhalation, BID - RT  insulin regular, 2-7 Units, Subcutaneous, Q6H  levothyroxine, 75 mcg, Oral, Q AM    Infusions   Diet  Diet: Regular/House; No Mixed Consistencies; Texture: Mechanical Ground (NDD 2); Fluid Consistency: Momeyer Thick    Tele SR     Assessment/Plan     Active Hospital Problems    Diagnosis  POA    **Cerebellar stroke, acute [I63.9]  Yes    Medically noncompliant [Z91.199]  Not Applicable    Ataxia [R27.0]  Yes    Chronic HFrEF (heart failure with reduced ejection fraction) [I50.22]  Yes    ESRD (end stage renal disease) [N18.6]  Yes    Paroxysmal atrial fibrillation  [I48.0]  Yes    Patent foramen ovale [Q21.12]  Not Applicable    Anemia, chronic disease [D63.8]  Yes    Essential hypertension [I10]  Yes    Acquired hypothyroidism [E03.9]  Yes    Type 2 diabetes mellitus, with long-term current use of insulin [E11.9, Z79.4]  Not Applicable      Resolved Hospital Problems   No resolved problems to display.       69 y.o. male admitted with Cerebellar stroke, acute.    Acute ischemic left cerebral CVA/ Vertebral artery stenosis/ Right ICA stenosis  CT angiogram of the head and neck did not show any acute intracranial findings however severe narrowing at the origin of the left vertebral artery and moderate stenosis of the origin of the right vertebral artery were noted.  Felt to be due to non- compliance with eliquis, statin, and ASA.   Aspirin, statin  Gradually normalize BP  PT/OT/SLP    Paroxysmal atrial fibrillation  Patent Foramen ovale  Eliquis 5 mg twice daily  SR    Hx of Chronic HF  EF 44%.    Not on GDMT ACE/ARB/ ARNI/ SGLT2i d/t renal dysfunction  Had issues with orthostatic hypotension on coreg and dose was reduced to 3.125 mg back in 1/2024  Home med reviewed and he carvedilol 3.125 mg BID was listed. Poor historian and unsure if he was actually taking.   Pt has issues with medication adherence.  Trend BP's. Coreg placed on hold on admit.  Reassess resumption of low dose coreg if ok with nephrology.    Neurology is recommend BP control.     T2DM  SSI    Hypertension  Normalize BP  See how BP is following HD and look at resuming low dose carvedilol     Hypothyroidism  On levothyroxine    ESRD  Has HD planned for today.   Nephrology following    Chronic Anemia  Hgb 9.7 (10.3) on 9/5  Recheck in am.   Looks like he trend 10.4-11      Eliquis (home med) for DVT prophylaxis.  Full code.  Discussed with patient.  Anticipate discharge to SNF on Tuesday, 9/10/24      JI Chau  [facility]  09/07/24  12:45 EDT

## 2024-09-07 NOTE — NURSING NOTE
Hd completed just short of ordered time. Pt terminated tx 15min to avoid bm while in hd suite. 915cc net uf. Post vss 124/58 hr 74

## 2024-09-07 NOTE — PROGRESS NOTES
[facility] Progress Note      Patient Name: Carlito Mo  : 1955  MRN: [id number]  Primary Care Physician:  Belle Simons APRN  Date of admission: 9/3/2024    Subjective     Interval History:   Follow-up ESRD    Eating breakfast; looks comfortable but reports shortness of breath  Denies problems swallowing  No chest pain    Review of Systems:   As noted above    Objective     Vitals:   Temp:  [97.4 °F (36.3 °C)-98.1 °F (36.7 °C)] 97.6 °F (36.4 °C)  Heart Rate:  [64-75] 75  Resp:  [16-18] 18  BP: (125-138)/(62-73) 134/73  No intake or output data in the 24 hours ending 24      Physical Exam:    General: Frail, chr ill.  Conversant though slow speech   Skin: warm and dry  HEENT: oral mucosa normal, nonicteric sclera  Neck: supple, no JVD  Lungs: Clear to auscultation bilaterally.  Not labored on RA  Heart: Irregularly irregular.  Not tachycardic  Abdomen: soft, nontender, nondistended. +bs  : no palpable bladder  Extremities: Right upper extremity AV fistula patent.      Scheduled Meds:     apixaban, 2.5 mg, Oral, Q12H  aspirin, 325 mg, Oral, Daily   Or  aspirin, 300 mg, Rectal, Daily  atorvastatin, 80 mg, Oral, Nightly  budesonide-formoterol, 2 puff, Inhalation, BID - RT  insulin regular, 2-7 Units, Subcutaneous, Q6H  levothyroxine, 75 mcg, Oral, Q AM      IV Meds:        Results Reviewed:   I have personally reviewed the results from the time of this admission to 2024 09:23 EDT     Results from last 7 days   Lab Units 24  0409 24  0510 24  0420 24  0416 24   SODIUM mmol/L 136 137 135* 137 138   POTASSIUM mmol/L 4.3 4.0 4.0 4.0 3.9   CHLORIDE mmol/L 99 99 98 97* 97*   CO2 mmol/L 26.0 28.4 27.0 28.0 29.3*   BUN mg/dL 48* 32* 51* 38* 26*   CREATININE mg/dL 4.20* 3.10* 3.99* 3.13* 2.48*   CALCIUM mg/dL 8.9 8.9 9.1 9.1 9.3   BILIRUBIN mg/dL  --   --   --  0.2 0.2   ALK PHOS U/L  --   --   --  84 86   ALT (SGPT) U/L  --   --   --   11 15   AST (SGOT) U/L  --   --   --  11 15   GLUCOSE mg/dL 119* 131* 124* 185* 232*       Estimated Creatinine Clearance: 14.1 mL/min (A) (by C-G formula based on SCr of 4.2 mg/dL (H)).    Results from last 7 days   Lab Units 09/07/24  0409 09/06/24  0510 09/05/24  0420   PHOSPHORUS mg/dL 4.0 4.0 4.7*             Results from last 7 days   Lab Units 09/05/24  0421 09/04/24  0416 09/03/24 2010   WBC 10*3/mm3 4.33 5.96 6.67   HEMOGLOBIN g/dL 9.7* 10.3* 11.5*   PLATELETS 10*3/mm3 257 259 293             Assessment / Plan     ASSESSMENT:  ESRD.  Volume stable.  Electrolytes fine.  Due for HD today  Acute ischemic left cerebellar CVA.  Likely due to paroxysmal atrial fibrillation and noncompliance with Eliquis.  CTA head and neck noted with severe narrowing at the origin of the left vertebral artery and moderate stenosis of the origin of the right vertebral artery.  40-50% stenosis of the proximal right internal carotid artery.  Recurrent strokes.  Not compliant with prescribed Eliquis, aspirin and statin.    Paroxysmal atrial fibrillation  Diabetes mellitus type 2  Hypothyroid on replacement  Chronic combined heart failure .  EF on echocardiogram 44%.  Hypertension, chronic kidney disease.  Permissive hypertension for now  Anemia of ESRD        PLAN:  HD today (TTS)  Encouraged him to eat    Thank you for involving us in the care of Carlito Mo.  Please feel free to call with any questions.    Ramakrishna Blake MD  09/07/24  09:23 EDT    [facility]  [phone]    Please note that portions of this note were completed with a voice recognition program.

## 2024-09-08 VITALS
DIASTOLIC BLOOD PRESSURE: 73 MMHG | BODY MASS INDEX: 18.12 KG/M2 | RESPIRATION RATE: 18 BRPM | TEMPERATURE: 98.2 F | OXYGEN SATURATION: 96 % | WEIGHT: 133.8 LBS | SYSTOLIC BLOOD PRESSURE: 139 MMHG | HEIGHT: 72 IN | HEART RATE: 77 BPM

## 2024-09-08 LAB
ALBUMIN SERPL-MCNC: 3.6 G/DL (ref 3.5–5.2)
ANION GAP SERPL CALCULATED.3IONS-SCNC: 9 MMOL/L (ref 5–15)
BUN SERPL-MCNC: 28 MG/DL (ref 8–23)
BUN/CREAT SERPL: 9.5 (ref 7–25)
CALCIUM SPEC-SCNC: 8.6 MG/DL (ref 8.6–10.5)
CHLORIDE SERPL-SCNC: 100 MMOL/L (ref 98–107)
CO2 SERPL-SCNC: 24 MMOL/L (ref 22–29)
CREAT SERPL-MCNC: 2.96 MG/DL (ref 0.76–1.27)
DEPRECATED RDW RBC AUTO: 43.7 FL (ref 37–54)
EGFRCR SERPLBLD CKD-EPI 2021: 22.2 ML/MIN/1.73
ERYTHROCYTE [DISTWIDTH] IN BLOOD BY AUTOMATED COUNT: 13.6 % (ref 12.3–15.4)
GLUCOSE BLDC GLUCOMTR-MCNC: 111 MG/DL (ref 70–130)
GLUCOSE BLDC GLUCOMTR-MCNC: 155 MG/DL (ref 70–130)
GLUCOSE SERPL-MCNC: 121 MG/DL (ref 65–99)
HCT VFR BLD AUTO: 30.5 % (ref 37.5–51)
HGB BLD-MCNC: 9.7 G/DL (ref 13–17.7)
MCH RBC QN AUTO: 28 PG (ref 26.6–33)
MCHC RBC AUTO-ENTMCNC: 31.8 G/DL (ref 31.5–35.7)
MCV RBC AUTO: 87.9 FL (ref 79–97)
PHOSPHATE SERPL-MCNC: 2.8 MG/DL (ref 2.5–4.5)
PLATELET # BLD AUTO: 239 10*3/MM3 (ref 140–450)
PMV BLD AUTO: 10 FL (ref 6–12)
POTASSIUM SERPL-SCNC: 4.1 MMOL/L (ref 3.5–5.2)
RBC # BLD AUTO: 3.47 10*6/MM3 (ref 4.14–5.8)
SODIUM SERPL-SCNC: 133 MMOL/L (ref 136–145)
WBC NRBC COR # BLD AUTO: 6.3 10*3/MM3 (ref 3.4–10.8)

## 2024-09-08 PROCEDURE — 63710000001 INSULIN REGULAR HUMAN PER 5 UNITS: Performed by: HOSPITALIST

## 2024-09-08 PROCEDURE — 82948 REAGENT STRIP/BLOOD GLUCOSE: CPT

## 2024-09-08 PROCEDURE — 94799 UNLISTED PULMONARY SVC/PX: CPT

## 2024-09-08 PROCEDURE — 85027 COMPLETE CBC AUTOMATED: CPT | Performed by: NURSE PRACTITIONER

## 2024-09-08 PROCEDURE — 25010000002 ONDANSETRON PER 1 MG: Performed by: NURSE PRACTITIONER

## 2024-09-08 PROCEDURE — 80069 RENAL FUNCTION PANEL: CPT | Performed by: INTERNAL MEDICINE

## 2024-09-08 PROCEDURE — 94664 DEMO&/EVAL PT USE INHALER: CPT

## 2024-09-08 RX ORDER — ASPIRIN 325 MG
325 TABLET ORAL DAILY
Qty: 30 TABLET | Refills: 0 | Status: SHIPPED | OUTPATIENT
Start: 2024-09-09

## 2024-09-08 RX ORDER — PANTOPRAZOLE SODIUM 40 MG/1
40 TABLET, DELAYED RELEASE ORAL
Qty: 30 TABLET | Refills: 0 | Status: SHIPPED | OUTPATIENT
Start: 2024-09-08

## 2024-09-08 RX ORDER — PANTOPRAZOLE SODIUM 40 MG/1
40 TABLET, DELAYED RELEASE ORAL
Qty: 30 TABLET | Refills: 0 | Status: SHIPPED | OUTPATIENT
Start: 2024-09-08 | End: 2024-09-08

## 2024-09-08 RX ORDER — LEVOTHYROXINE SODIUM 75 UG/1
75 TABLET ORAL
Qty: 30 TABLET | Refills: 0 | Status: SHIPPED | OUTPATIENT
Start: 2024-09-09

## 2024-09-08 RX ORDER — TAMSULOSIN HYDROCHLORIDE 0.4 MG/1
1 CAPSULE ORAL DAILY
Qty: 30 CAPSULE | Refills: 0 | Status: SHIPPED | OUTPATIENT
Start: 2024-09-08

## 2024-09-08 RX ORDER — FAMOTIDINE 20 MG/1
20 TABLET, FILM COATED ORAL 2 TIMES DAILY PRN
Qty: 30 TABLET | Refills: 0 | Status: SHIPPED | OUTPATIENT
Start: 2024-09-08

## 2024-09-08 RX ORDER — LOPERAMIDE HCL 2 MG
2 CAPSULE ORAL ONCE
Status: COMPLETED | OUTPATIENT
Start: 2024-09-08 | End: 2024-09-08

## 2024-09-08 RX ORDER — ATORVASTATIN CALCIUM 80 MG/1
80 TABLET, FILM COATED ORAL NIGHTLY
Qty: 90 TABLET | Refills: 0 | Status: SHIPPED | OUTPATIENT
Start: 2024-09-08

## 2024-09-08 RX ORDER — CALCIUM CARBONATE 500 MG/1
1 TABLET, CHEWABLE ORAL 2 TIMES DAILY PRN
Status: DISCONTINUED | OUTPATIENT
Start: 2024-09-08 | End: 2024-09-08 | Stop reason: HOSPADM

## 2024-09-08 RX ORDER — PANTOPRAZOLE SODIUM 40 MG/1
40 TABLET, DELAYED RELEASE ORAL
Status: DISCONTINUED | OUTPATIENT
Start: 2024-09-08 | End: 2024-09-08 | Stop reason: HOSPADM

## 2024-09-08 RX ORDER — BUDESONIDE AND FORMOTEROL FUMARATE DIHYDRATE 160; 4.5 UG/1; UG/1
2 AEROSOL RESPIRATORY (INHALATION)
Qty: 10.2 G | Refills: 0 | Status: SHIPPED | OUTPATIENT
Start: 2024-09-08

## 2024-09-08 RX ORDER — FAMOTIDINE 20 MG/1
20 TABLET, FILM COATED ORAL
Status: DISCONTINUED | OUTPATIENT
Start: 2024-09-08 | End: 2024-09-08 | Stop reason: HOSPADM

## 2024-09-08 RX ADMIN — LEVOTHYROXINE SODIUM 75 MCG: 75 TABLET ORAL at 06:45

## 2024-09-08 RX ADMIN — BUDESONIDE AND FORMOTEROL FUMARATE DIHYDRATE 2 PUFF: 160; 4.5 AEROSOL RESPIRATORY (INHALATION) at 09:01

## 2024-09-08 RX ADMIN — ONDANSETRON 4 MG: 2 INJECTION, SOLUTION INTRAMUSCULAR; INTRAVENOUS at 09:57

## 2024-09-08 RX ADMIN — FAMOTIDINE 20 MG: 20 TABLET, FILM COATED ORAL at 12:41

## 2024-09-08 RX ADMIN — INSULIN HUMAN 2 UNITS: 100 INJECTION, SOLUTION PARENTERAL at 11:45

## 2024-09-08 RX ADMIN — ASPIRIN 325 MG: 325 TABLET ORAL at 08:33

## 2024-09-08 RX ADMIN — PANTOPRAZOLE SODIUM 40 MG: 40 TABLET, DELAYED RELEASE ORAL at 12:41

## 2024-09-08 RX ADMIN — LOPERAMIDE HYDROCHLORIDE 2 MG: 2 CAPSULE ORAL at 11:35

## 2024-09-08 RX ADMIN — APIXABAN 2.5 MG: 2.5 TABLET, FILM COATED ORAL at 08:33

## 2024-09-08 NOTE — NURSING NOTE
RN attempted to call report to [facility], was told to call when transport ETA is available .     Report given to Raeann BENZ

## 2024-09-08 NOTE — PLAN OF CARE
Goal Outcome Evaluation:  Plan of Care Reviewed With: patient        Progress: no change  Outcome Evaluation: A&O. KATRINA MCCLELLAND. NIH 0. Right UA fistula. Mech soft, NTL diet. Meds with applesauce. Several incont stools this shift. Up with 1 with walker. No complaints of pain

## 2024-09-08 NOTE — PROGRESS NOTES
[facility] Progress Note      Patient Name: Carlito Mo  : 1955  MRN: [id number]  Primary Care Physician:  Belle Simons APRN  Date of admission: 9/3/2024    Subjective     Interval History:   Follow-up ESRD    Had HD yesterday with 0.9 L removed  He feels very tired  Breathing is comfortable on room air  Denies problems swallowing, but appetite is mediocre      Review of Systems:   As noted above    Objective     Vitals:   Temp:  [97.7 °F (36.5 °C)-98.2 °F (36.8 °C)] 98.2 °F (36.8 °C)  Heart Rate:  [69-82] 77  Resp:  [18] 18  BP: (125-139)/(60-73) 139/73    Intake/Output Summary (Last 24 hours) at 2024 1017  Last data filed at 2024 0828  Gross per 24 hour   Intake 118 ml   Output 1015 ml   Net -897 ml         Physical Exam:    General: Frail, chr ill.  Conversant though slow speech   Psychiatric: Flat affect and depressed mood  Skin: warm and dry  HEENT: oral mucosa normal, nonicteric sclera  Neck: supple, no JVD  Lungs: Clear to auscultation bilaterally.  Not labored on RA  Heart: Irregularly irregular.  Not tachycardic  Abdomen: soft, nontender, nondistended. +bs  : no palpable bladder  Extremities: Right upper extremity AV fistula patent.      Scheduled Meds:     apixaban, 2.5 mg, Oral, Q12H  aspirin, 325 mg, Oral, Daily   Or  aspirin, 300 mg, Rectal, Daily  atorvastatin, 80 mg, Oral, Nightly  budesonide-formoterol, 2 puff, Inhalation, BID - RT  insulin regular, 2-7 Units, Subcutaneous, Q6H  levothyroxine, 75 mcg, Oral, Q AM      IV Meds:        Results Reviewed:   I have personally reviewed the results from the time of this admission to 2024 10:17 EDT     Results from last 7 days   Lab Units 24  0416 24  0409 24  0510 24  0420 24  0416 24   SODIUM mmol/L 133* 136 137   < > 137 138   POTASSIUM mmol/L 4.1 4.3 4.0   < > 4.0 3.9   CHLORIDE mmol/L 100 99 99   < > 97* 97*   CO2 mmol/L 24.0 26.0 28.4   < > 28.0 29.3*    BUN mg/dL 28* 48* 32*   < > 38* 26*   CREATININE mg/dL 2.96* 4.20* 3.10*   < > 3.13* 2.48*   CALCIUM mg/dL 8.6 8.9 8.9   < > 9.1 9.3   BILIRUBIN mg/dL  --   --   --   --  0.2 0.2   ALK PHOS U/L  --   --   --   --  84 86   ALT (SGPT) U/L  --   --   --   --  11 15   AST (SGOT) U/L  --   --   --   --  11 15   GLUCOSE mg/dL 121* 119* 131*   < > 185* 232*    < > = values in this interval not displayed.       Estimated Creatinine Clearance: 20.2 mL/min (A) (by C-G formula based on SCr of 2.96 mg/dL (H)).    Results from last 7 days   Lab Units 09/08/24  0416 09/07/24  0409 09/06/24  0510   PHOSPHORUS mg/dL 2.8 4.0 4.0             Results from last 7 days   Lab Units 09/08/24  0416 09/05/24  0421 09/04/24  0416 09/03/24 2010   WBC 10*3/mm3 6.30 4.33 5.96 6.67   HEMOGLOBIN g/dL 9.7* 9.7* 10.3* 11.5*   PLATELETS 10*3/mm3 239 257 259 293             Assessment / Plan     ASSESSMENT:  ESRD.  Volume stable.  Electrolytes fine.  Last HD was 9/7  Acute ischemic left cerebellar CVA.  Likely due to paroxysmal atrial fibrillation and noncompliance with Eliquis.  CTA head and neck noted with severe narrowing at the origin of the left vertebral artery and moderate stenosis of the origin of the right vertebral artery.  40-50% stenosis of the proximal right internal carotid artery.  Recurrent strokes.  Not compliant with prescribed Eliquis, aspirin and statin.    Paroxysmal atrial fibrillation  Diabetes mellitus type 2  Hypothyroid on replacement  Chronic combined heart failure .  EF on echocardiogram 44%.  Hypertension, chronic kidney disease.  Permissive hypertension for now  Anemia of ESRD        PLAN:  HD TTS  Encouraged him to eat  Discharge anytime from [facility]    Thank you for involving us in the care of Carlito Mo.  Please feel free to call with any questions.    Ramakrishna Blake MD  09/08/24  10:17 EDT    [facility]  [phone]    Please note that portions of this note were completed  with a voice recognition program.

## 2024-09-08 NOTE — DISCHARGE SUMMARY
Patient Name: Carlito Mo  : 1955  MRN: [id number]    Date of Admission: 9/3/2024  Date of Discharge:  2024  Primary Care Physician: Belle Simons APRN      Chief Complaint:   Dizziness and Hypotension      Discharge Diagnoses     Active Hospital Problems    Diagnosis  POA    **Cerebellar stroke, acute [I63.9]  Yes    Medically noncompliant [Z91.199]  Not Applicable    Ataxia [R27.0]  Yes    Chronic HFrEF (heart failure with reduced ejection fraction) [I50.22]  Yes    ESRD (end stage renal disease) [N18.6]  Yes    Paroxysmal atrial fibrillation [I48.0]  Yes    Patent foramen ovale [Q21.12]  Not Applicable    Anemia, chronic disease [D63.8]  Yes    Essential hypertension [I10]  Yes    Acquired hypothyroidism [E03.9]  Yes    Type 2 diabetes mellitus, with long-term current use of insulin [E11.9, Z79.4]  Not Applicable      Resolved Hospital Problems   No resolved problems to display.        Hospital Course     Mr. Mo is a 69 y.o. male of ESRD on HD, PFO, PAF supposed to be maintained on Eliquis, prior strokes most recent 2022 right cerebellar infarct and bilateral thalamic infarct, hypertension, neurogenic orthostatic hypotension, chronic CHF and DM2 who was admitted after complaining of dizziness following HD.   In emergency room he was noted to have dizziness, nausea and vomiting as well as an ataxic gait.  He was admitted to the hospitalist service for workup and evaluation after imaging in the emergency room with MRI revealed cerebellar stroke with cortical atrophy as well as microhemorrhages.  Please see admission H&P for further details.  Neurology team was consulted and felt the left cerebellum infarct was likely related to his atrial fibs and noncompliance with Eliquis.  He was continued on Eliquis 2.5 mg twice daily for renal dosing increased dosing of aspirin to 325 mg daily.  Echocardiogram showing stable EF 45%, grade 1 diastolic dysfunction, with positive bubble saline test with  atrial shunting right to left speech-language pathology evaluated with video swallow performed on 9/5/2024 showing moderate, oral dysphagia, pharyngeal dysphagia with modified diet of mechanical ground textures, no mixed consistencies, nectar thick liquids.  He has tolerated that diet well.  Physical therapy and Occupational Therapy also worked with patient during the course of his stay and felt that rehab/SNF placement was the most appropriate level of care for him based on his functional capacity.  He is end-stage renal disease on hemodialysis and this was continued during the course of his stay and nephrology was consulted for management of this.  His dialysis was on Saturday, 9/7/2024.  He is followed by Dr. Blake as an outpatient who felt he was stable from a nephrology standpoint for discharge to SNF to resume his normal hemodialysis schedule.  Arrangements have been made for him to have dialysis this coming Tuesday.  He did undergo CTA of the head and neck which showed bilateral vertebral stenosis with left being worse than right but was felt by neurology to not be a good candidate for stent placement due to his history of medication noncompliance.    Urology and nephrology team felt he was stable from their standpoint for discharge to rehab/SNF.  He does have a bed available today.  RN staff reports that he has had off-and-on issues with loose stooling following his dialysis.  Patient reports this is a pattern for him at home as well.  Patient denies abdominal symptoms such as nausea,vomiting, and and is demonstrating good appetite and abdominal exam is benign.   White blood cell count is normal patient is afebrile.  Will give one-time small dose of Imodium today.        Day of Discharge     Subjective:  Feeling tired after dialysis yesterday.  Appetite fair.  No nausea,vomiting, abdominal pain.  Denies any fever or chills.  Some mild shortness of breath with exertion which he states is his baseline.   Denies orthopnea, chest pain.  Does report some loose stooling which he says occurs after he has dialysis and has been a pattern at home as well.    Physical Exam:  Temp:  [97.7 °F (36.5 °C)-98.2 °F (36.8 °C)] 98.2 °F (36.8 °C)  Heart Rate:  [69-82] 77  Resp:  [18] 18  BP: (125-139)/(60-73) 139/73  Body mass index is 18.15 kg/m².    Physical Exam  Vitals and nursing note reviewed.   Constitutional:       General: He is not in acute distress.     Appearance: He is ill-appearing (Chronically ill-appearing). He is not toxic-appearing.      Comments: Thin muscle wasting noted   Eyes:      General: No scleral icterus.     Conjunctiva/sclera: Conjunctivae normal.   Cardiovascular:      Rate and Rhythm: Normal rate and regular rhythm.      Pulses: Normal pulses.      Heart sounds: Normal heart sounds.   Pulmonary:      Effort: Pulmonary effort is normal.      Breath sounds: Normal breath sounds.   Abdominal:      General: Bowel sounds are normal. There is no distension.      Palpations: Abdomen is soft.      Tenderness: There is no abdominal tenderness.   Musculoskeletal:      Right lower leg: No edema.      Left lower leg: No edema.   Skin:     General: Skin is warm.      Capillary Refill: Capillary refill takes less than 2 seconds.      Coloration: Skin is pale.   Neurological:      Mental Status: He is alert and oriented to person, place, and time.      Motor: Weakness (Generalized weakness) present.      Comments: Moves all extremities well able to follow commands.  Nonfocal on exam   Psychiatric:      Comments: Flat affect, cooperative           Consultants     Consult Orders (all) (From admission, onward)       Start     Ordered    09/05/24 1057  Inpatient Case Management  Consult  Once        Provider:  (Not yet assigned)    09/05/24 1056    09/04/24 1539  Inpatient Nephrology Consult  Once        Provider:  Zahida Hsu MD    09/04/24 1539    09/03/24 9226  Notify Stroke Coordinator  Once         Provider:  (Not yet assigned)    09/03/24 2336 09/03/24 2336  Consult to Case Management   Once        Provider:  (Not yet assigned)    09/03/24 2336 09/03/24 2336  Consult to Diabetes Educator  Once,   Status:  Canceled        Provider:  (Not yet assigned)    09/03/24 2336 09/03/24 2336  Inpatient Neurology Consult Stroke  Once        Specialty:  Neurology  Provider:  García Trujillo MD    09/03/24 2336 09/03/24 2302  [facility] (on-call MD unless specified) Details  Once        Specialty:  Hospitalist  Provider:  (Not yet assigned)    09/03/24 2301                  Procedures     * Surgery not found *    Imaging Results (All)       Procedure Component Value Units Date/Time    FL Video Swallow Single Contrast [[id number]] Collected: 09/05/24 1542     Updated: 09/05/24 1546    Narrative:      VIDEO SWALLOWING EXAMINATION BY SPEECH PATHOLOGY     Clinical: Dysphasia     Video swallowing examination performed under the direction of speech  pathology. Imaging reviewed by radiologist who concurs with the  findings.     Speech pathology summary:  VFSS completed, Rachel WORKMAN present.  Patient presents with moderate oropharyngeal dysphagia. Patient  demonstrates decreased hyolaryngeal excursion/elevation, reduced base of  tongue retraction, generalized pharyngeal weakness. Patient demonstrates  premature spillage to the pyriforms before the swallow, penetration  before the swallow with thin via cup. Moderate residue pooling to the  pyriforms after the swallow. Patient demonstrates no penetration or  aspiration initial sip of thin via straw, incomplete swallow and  attempts to clear pharyngeal residue. Patient demonstrates premature  spillage of nectar to the vallecula, mild to moderate residue pooling to  the piriforms. Able to partially clear nectar residue with repeat  swallow. Patient demonstrates premature spillage to the vallecula puree  bolus, moderate to severe diffuse pharyngeal  residue after the swallow.  Premature spillage to mechanical soft to the pyriforms, mod to severe  residue after the swallow. Minimal clearance pur?e and soft solid  residue with double swallow, improved with liquid wash. Tongue pumping  with regular texture, mild to moderate pharyngeal residue after the  swallow. Independent use of thin liquid wash, silent aspiration thin.        FLUOROSCOPY TIME: 2 minutes 58 seconds, 4974 images.     This report was finalized on 9/5/2024 3:42 PM by Dr. Bryan Burnham M.D  on Workstation: [id number]       CT Head Without Contrast [[id number]] Collected: 09/04/24 0835     Updated: 09/05/24 0656    Narrative:      EMERGENCY CT SCAN OF THE HEAD WITHOUT CONTRAST 09/04/2024     CLINICAL HISTORY: Ataxia and dizziness.     TECHNIQUE: Spiral CT images were obtained from the base of the skull to  the vertex without intravenous contrast. The images were reformatted and  are submitted in 3 mm thick axial, sagittal and coronal CT sections with  brain algorithm.     This is correlated to a prior head CT from [facility] on  01/04/2024 and a prior MRI of the brain on 09/17/2022.     FINDINGS: There are extensive patchy and confluent areas of low-density  throughout the cerebral white matter consistent with severe small vessel  disease. There is a chronic area of encephalomalacia in the superior  left parietal cortex measuring 2.5 x 1.8 cm in size compatible with an  old superior left parietal cortical infarct, unchanged dating back to  MRI of the brain 09/17/2022. It is in the left MCA territory.  Furthermore there is a 2.7 x 1.3 cm chronic infarct in the superomedial  right cerebellum in the right superior cerebellar artery territory and  there is a 18 x 5 mm chronic infarct in the posterolateral left  cerebellum in the left PICA territory, unchanged since MRI of the brain  09/17/2022. There are tiny old lacunar infarcts in the thalami  bilaterally unchanged. There is diffuse  cerebral atrophy. The ventricles  are normal in size. I see no mass effect and no midline shift and no  extra-axial fluid collections are identified and there is no evidence of  acute intracranial hemorrhage. There is a 2.5 cm mucous retention cyst  in the posterior right maxillary sinus. The remainder of the paranasal  sinuses, mastoid air cells and middle ear cavities are clear. The  calvarium and skull base are normal in appearance. There is a chronic  sebaceous cyst in the posterior superior left parietal scalp measuring  18 x 14 mm in size.       Impression:      1. No convincing acute intracranial abnormality is identified with no  discernible change when compared to prior MRI of the brain on  09/17/2022.     2. There is severe small vessel disease in the cerebral white matter.  There is a 2.5 x 1.8 cm old superior left parietal infarct in the left  MCA territory. There is a 2.7 x 1.3 cm old superomedial right cerebellar  infarct in the right superior cerebellar artery territory and there is  an 18 x 5 mm old posterolateral left cerebellar infarct in the left PICA  territory and there are small old lacunar infarcts in the thalami  bilaterally. There is diffuse cerebral atrophy.     3. There is a 2.5 cm mucous retention cyst in the posterior right  maxillary sinus and there is a sebaceous cyst in the posterior superior  left parietal scalp measuring 18 x 14 x 19 mm in size. The remainder of  the head CT is normal. The etiology of the ataxia and dizziness is not  established on this exam and if there remains any clinical suspicion of  an acute infarct, I recommend an MRI of the brain for a more complete  assessment. The findings and recommendations were communicated to  Eliseo Johnson MD, by telephone on 09/04/2024 at 8:20 a.m.     Radiation dose reduction techniques were utilized, including automated  exposure control and exposure modulation based on body size.        This report was finalized on 9/5/2024  6:53 AM by Dr. Alexey Aldana M.D on  Workstation: [id number]       MRI Brain Without Contrast [[id number]] Collected: 09/04/24 1232     Updated: 09/05/24 0640    Narrative:      STAT MRI OF THE BRAIN WITHOUT CONTRAST ON 09/04/2024     CLINICAL HISTORY: This is a 69-year-old male patient who has a prior  history of stroke and has acute dizziness after dialysis on 09/03/2024,  evaluate for acute stroke.     TECHNIQUE: Axial T1, FLAIR, fat-suppressed T2, axial diffusion and  gradient echo T2 and sagittal T1-weighted images were obtained of the  entire head.     This is correlated to a prior MRI of the brain from [facility] on 09/17/2022.     FINDINGS: There is extensive patchy and confluent T2 high signal  throughout the periventricular and subcortical white matter of the  cerebral hemispheres compatible with severe small vessel disease. There  are tiny old lacunar infarcts in the mid right corona radiata region and  in the thalami bilaterally. There is a 2.5 cm old superior parietal  infarct in the left MCA territory. There is a 2.4 x 1.1 cm old superior  medial right cerebellar infarct and an additional 1.8 x 0.5 cm old  superior right vermian cerebellar infarct in the right superior  cerebellar artery territory. There is a 7 x 2 mm old posterior inferior  lateral right cerebellar infarct in the right PICA territory and there  is a 14 x 4 mm old posterior inferior lateral left cerebellar infarct in  the left PICA territory, and these are all unchanged since prior MRI  09/17/2022. There is an ovoid area of intense increased signal intensity  on the diffusion weighted images projecting over the mid left cerebellar  white matter that measures 5 mm in size. It is a little dark on the ADC  maps and bright on the FLAIR images and is compatible with a tiny acute  mid left cerebellar white matter infarct. There is mild diffuse cerebral  atrophy.  The lateral and third ventricles are mildly prominent in  size,  felt to be on the basis of central volume loss or atrophy. I see no mass  effect and no midline shift. No extra-axial fluid collections are  identified. Calvarium and skull base are normal in appearance. There is  a 2.5 cm mucous retention cyst in the posterior right maxillary sinus.  Otherwise, the paranasal sinuses and the mastoid air cells and the  middle ear cavities are clear. There are bilateral intraocular lens  implants in the globes from previous cataract surgery, otherwise the  orbits are normal in appearance. There is a tiny punctate 2 mm focus of  signal loss on the gradient echo T2 weighted images in the right lateral  malcolm, likely a punctate area of hemosiderin deposition due to an old  hypertensive microhemorrhage and there is a 4 mm rounded focus of signal  loss in the inferior medial left cerebellum on the gradient echo T2  weighted images compatible with an additional focus of hemosiderin  deposition probably secondary to old hypertensive microhemorrhages.   There is a cluster of nodular foci of signal loss in the right and left  thalami compatible with multiple punctate old microhemorrhages in the  thalami bilaterally likely secondary to hypertension.  There is a 5 mm  rounded focus of signal loss in the superior lateral right occipital  juxtacortical white matter compatible with a tiny old microhemorrhage at  this site and given this location it could be secondary to amyloid.        Impression:      1. Since the prior MRI of the brain on 09/17/2022 there has been  development of a 5 mm rounded diffusion hyperintense focus in the mid  left cerebellum.  This is most compatible with a tiny acute mid left  cerebellar white matter infarct. Otherwise, there has been no  significant change.     2. There is severe small vessel disease in the cerebral white matter.  There are old lacunar infarcts in the mid right corona radiata region  and the thalami bilaterally. There are multiple old bilateral  cerebellar  infarcts including a 2.4 x 1.1 cm old superior medial right cerebellar  infarct and an additional 1.8 x 0.5 cm old superior right cerebellar  vermian infarct and these are both in the right superior cerebellar  artery territory. There is an additional 7 x 2 mm old posterior inferior  lateral right cerebellar infarct in the right PICA territory and there  is a 14 x 4 mm old posterior inferior lateral left cerebellar infarct in  the left PICA territory.     3. There are multiple punctate foci of hemosiderin deposition from tiny  old microhemorrhages with 4 in the left thalamus and 4 in the right  thalamus, and a 2 mm focus in the right side of the malcolm and these are  good locations for old hypertensive microhemorrhages. There is a 4 mm  focus in the inferior medial left cerebellum and a 4-5 mm rounded focus  in the superior lateral right occipital juxtacortical white matter that  could be secondary to amyloid. There is diffuse cerebral atrophy. The  remainder of the MRI of the brain is normal. I communicated the results  to Eliseo Card MD, by telephone on 09/04/2024 at 11:50 a.m.     This report was finalized on 9/5/2024 6:37 AM by Dr. Alexey Aldana M.D on  Workstation: [id number]       CT Angiogram Head [[id number]] Collected: 09/05/24 0423     Updated: 09/05/24 0437    Narrative:      NONCONTRAST CRANIAL CT SCAN, CT ANGIOGRAM NECK, CT ANGIOGRAM HEAD.     HISTORY: Acute CVA,      COMPARISON: None..     TECHNIQUE:  Radiation dose reduction techniques were utilized, including  automated exposure control and exposure modulation based on body size.   Initially, a routine noncontrast cranial CT performed from the skull  base through the vertex region. After review, thin-section contrast  enhanced CT angiogram images obtained from the aortic arch through the  calvarial vertex utilizing angiographic technique. Multi projection 3-D  MIP reformatted images were supplemented and reviewed.        FINDINGS  CRANIAL CT: No acute hemorrhage or midline shift is  demonstrated.. The patient has advanced atrophy for the age of 69. There  is periventricular and deep white matter microangiopathic change. Old  lacunar infarct is noted within the left thalamus. Postcontrast imaging  does not show any evidence of venous occlusion. No abnormal enhancement  is seen. Bone window images demonstrate a large mucous retention cyst  within the right maxillary sinus. Mucosal thickening is noted within the  ethmoid sinuses..     CERVICAL CAROTID CT ANGIOGRAM:     FINDINGS: There is normal arch anatomy. There is atherosclerotic  involvement of the aortic arch. Common carotid arteries are patent.  Calcified plaque is noted at the carotid bifurcations bilaterally.  Distal cervical internal carotid arteries are patent. Calcified plaque  is noted at the origins of the vertebral arteries bilaterally. The left  is slightly larger in caliber. There is severe stenosis at the origin of  the left vertebral artery. There is moderate narrowing at the origin of  the right vertebral artery. There is approximately 40 to 50% stenosis of  the proximal right internal carotid artery. Images through the lung  apices do not demonstrate any acute abnormalities. Thyroid gland and  trachea are within normal limits.        NASCET criteria utilized in stenosis measurements. stenosis mild, 0-49%.        CRANIAL CTA ANGIOGRAM:     FINDINGS: Intracranial vertebral arteries are patent. Basilar artery is  also patent. Both P1 segments are very hypoplastic. There are  well-developed posterior communicating arteries bilaterally. Posterior  cerebral arteries are patent. Intracranial internal carotid arteries are  patent. There is an anterior communicating artery. Right A1 is somewhat  hypoplastic. The middle cerebral and anterior cerebral arteries are  patent.             Radiation dose reduction techniques were utilized, including automated  exposure control and exposure  modulation based on body size.          Impression:      1. 1. No acute intracranial findings.  2. Severe narrowing at the origin of the left vertebral artery, and  moderate stenosis of the origin of the right vertebral artery.  3. Approximately 40 to 50% stenosis of the proximal right internal  carotid artery.  4. No intracranial stenosis or occlusion.        Radiation dose reduction techniques were utilized, including automated  exposure control and exposure modulation based on body size.        This report was finalized on 9/5/2024 4:34 AM by Dr. Alice Allen M.D on Workstation: [id number]       CT Angiogram Neck [[id number]] Collected: 09/05/24 0423     Updated: 09/05/24 0437    Narrative:      NONCONTRAST CRANIAL CT SCAN, CT ANGIOGRAM NECK, CT ANGIOGRAM HEAD.     HISTORY: Acute CVA,      COMPARISON: None..     TECHNIQUE:  Radiation dose reduction techniques were utilized, including  automated exposure control and exposure modulation based on body size.   Initially, a routine noncontrast cranial CT performed from the skull  base through the vertex region. After review, thin-section contrast  enhanced CT angiogram images obtained from the aortic arch through the  calvarial vertex utilizing angiographic technique. Multi projection 3-D  MIP reformatted images were supplemented and reviewed.        FINDINGS CRANIAL CT: No acute hemorrhage or midline shift is  demonstrated.. The patient has advanced atrophy for the age of 69. There  is periventricular and deep white matter microangiopathic change. Old  lacunar infarct is noted within the left thalamus. Postcontrast imaging  does not show any evidence of venous occlusion. No abnormal enhancement  is seen. Bone window images demonstrate a large mucous retention cyst  within the right maxillary sinus. Mucosal thickening is noted within the  ethmoid sinuses..     CERVICAL CAROTID CT ANGIOGRAM:     FINDINGS: There is normal arch anatomy. There is  atherosclerotic  involvement of the aortic arch. Common carotid arteries are patent.  Calcified plaque is noted at the carotid bifurcations bilaterally.  Distal cervical internal carotid arteries are patent. Calcified plaque  is noted at the origins of the vertebral arteries bilaterally. The left  is slightly larger in caliber. There is severe stenosis at the origin of  the left vertebral artery. There is moderate narrowing at the origin of  the right vertebral artery. There is approximately 40 to 50% stenosis of  the proximal right internal carotid artery. Images through the lung  apices do not demonstrate any acute abnormalities. Thyroid gland and  trachea are within normal limits.        NASCET criteria utilized in stenosis measurements. stenosis mild, 0-49%.        CRANIAL CTA ANGIOGRAM:     FINDINGS: Intracranial vertebral arteries are patent. Basilar artery is  also patent. Both P1 segments are very hypoplastic. There are  well-developed posterior communicating arteries bilaterally. Posterior  cerebral arteries are patent. Intracranial internal carotid arteries are  patent. There is an anterior communicating artery. Right A1 is somewhat  hypoplastic. The middle cerebral and anterior cerebral arteries are  patent.             Radiation dose reduction techniques were utilized, including automated  exposure control and exposure modulation based on body size.          Impression:      1. 1. No acute intracranial findings.  2. Severe narrowing at the origin of the left vertebral artery, and  moderate stenosis of the origin of the right vertebral artery.  3. Approximately 40 to 50% stenosis of the proximal right internal  carotid artery.  4. No intracranial stenosis or occlusion.        Radiation dose reduction techniques were utilized, including automated  exposure control and exposure modulation based on body size.        This report was finalized on 9/5/2024 4:34 AM by Dr. Alice Allen M.D on Workstation:  [id number]             Results for orders placed during the hospital encounter of 04/16/24    Duplex Hemodialysis Access CAR    Interpretation Summary    Patent right radiocephalic AV fistula with proximal stenosis.  Flow volumes are inadequate.    Results for orders placed during the hospital encounter of 09/03/24    Adult transthoracic echo complete    Interpretation Summary    Left ventricular systolic function is mildly decreased. Calculated left ventricular EF = 44.7%. The following left ventricular wall segments are hypokinetic: apical inferior, mid inferior and apical septal.    Left ventricular diastolic function is consistent with (grade Ia w/high LAP) impaired relaxation.    There is a focal area of thickening and calcification on the anterior mitral valve without any significant stenosis or regurgitation.    Saline test results are negative for right to left atrial level shunt.    Pertinent Labs     Results from last 7 days   Lab Units 09/08/24 0416 09/05/24 0421 09/04/24 0416 09/03/24 2010   WBC 10*3/mm3 6.30 4.33 5.96 6.67   HEMOGLOBIN g/dL 9.7* 9.7* 10.3* 11.5*   PLATELETS 10*3/mm3 239 257 259 293     Results from last 7 days   Lab Units 09/08/24 0416 09/07/24  0409 09/06/24 0510 09/05/24 0420   SODIUM mmol/L 133* 136 137 135*   POTASSIUM mmol/L 4.1 4.3 4.0 4.0   CHLORIDE mmol/L 100 99 99 98   CO2 mmol/L 24.0 26.0 28.4 27.0   BUN mg/dL 28* 48* 32* 51*   CREATININE mg/dL 2.96* 4.20* 3.10* 3.99*   GLUCOSE mg/dL 121* 119* 131* 124*   EGFR mL/min/1.73 22.2* 14.6* 21.0* 15.5*     Results from last 7 days   Lab Units 09/08/24 0416 09/07/24  0409 09/06/24  0510 09/05/24  0420 09/04/24  0416 09/03/24  2010   ALBUMIN g/dL 3.6 3.7 3.8 3.9 4.1 4.5   BILIRUBIN mg/dL  --   --   --   --  0.2 0.2   ALK PHOS U/L  --   --   --   --  84 86   AST (SGOT) U/L  --   --   --   --  11 15   ALT (SGPT) U/L  --   --   --   --  11 15     Results from last 7 days   Lab Units 09/08/24  0416 09/07/24  0409 09/06/24  0510  09/05/24  0420   CALCIUM mg/dL 8.6 8.9 8.9 9.1   ALBUMIN g/dL 3.6 3.7 3.8 3.9   PHOSPHORUS mg/dL 2.8 4.0 4.0 4.7*       Results from last 7 days   Lab Units 09/03/24 2214 09/03/24 2010   HSTROP T ng/L 123* 120*       Results from last 7 days   Lab Units 09/04/24  0416   CHOLESTEROL mg/dL 182   TRIGLYCERIDES mg/dL 96   HDL CHOL mg/dL 53   LDL CHOL mg/dL 112*             Test Results Pending at Discharge       Discharge Details        Discharge Medications        New Medications        Instructions Start Date   aspirin 325 MG tablet  Replaces: aspirin 81 MG EC tablet   325 mg, Oral, Daily   Start Date: September 9, 2024     atorvastatin 80 MG tablet  Commonly known as: LIPITOR   80 mg, Oral, Nightly             Changes to Medications        Instructions Start Date   levothyroxine 75 MCG tablet  Commonly known as: SYNTHROID, LEVOTHROID  What changed: when to take this   75 mcg, Oral, Every Early Morning   Start Date: September 9, 2024            Continue These Medications        Instructions Start Date   apixaban 2.5 MG tablet tablet  Commonly known as: ELIQUIS   2.5 mg, Oral, Every 12 Hours Scheduled      budesonide-formoterol 160-4.5 MCG/ACT inhaler  Commonly known as: SYMBICORT   2 puffs, Inhalation, 2 Times Daily - RT      ipratropium-albuterol  MCG/ACT inhaler  Commonly known as: COMBIVENT RESPIMAT   1 puff, Inhalation, 4 Times Daily PRN      pantoprazole 40 MG EC tablet  Commonly known as: PROTONIX   40 mg, Oral, 2 Times Daily Before Meals      tamsulosin 0.4 MG capsule 24 hr capsule  Commonly known as: FLOMAX   0.4 mg, Oral, Daily      vitamin D3 125 MCG (5000 UT) capsule capsule   5,000 Units, Oral, Daily             Stop These Medications      aspirin 81 MG EC tablet  Replaced by: aspirin 325 MG tablet     buPROPion  MG 24 hr tablet  Commonly known as: WELLBUTRIN XL     carvedilol 3.125 MG tablet  Commonly known as: COREG     famotidine 10 MG tablet  Commonly known as: PEPCID     rosuvastatin 10  MG tablet  Commonly known as: CRESTOR              Allergies   Allergen Reactions    Prozac [Fluoxetine Hcl] Other (See Comments)     shaking       Discharge Disposition:  Skilled Nursing Facility (DC - External)      Discharge Diet:  Diet Order   Procedures    Diet: Regular/House; No Mixed Consistencies; Texture: Mechanical Ground (NDD 2); Fluid Consistency: Nectar Thick       Discharge Activity:   Activity Instructions       Gradually Increase Activity Until at Pre-Hospitalization Level              CODE STATUS:    Code Status and Medical Interventions: CPR (Attempt to Resuscitate); Full Support   Ordered at: 09/03/24 9340     Code Status (Patient has no pulse and is not breathing):    CPR (Attempt to Resuscitate)     Medical Interventions (Patient has pulse or is breathing):    Full Support       Future Appointments   Date Time Provider Department Center   9/12/2024 10:45 AM Katrin Shah APRN [facility]   9/12/2024 11:30 AM [facility] RM 1 [facility]   10/2/2024  1:00 PM Ania Amaya MD [facility]      Contact information for follow-up providers       [facility] [place] APRN .    Specialty: Family Medicine  Contact information:  [address] [facility] [address] [phone]                       Contact information for after-discharge care       Destination       [facility] .    Service: Skilled Nursing  Contact information:  [address] [facility] [address] [phone]                                   Time Spent on Discharge:  Greater than 30 minutes      JI Chau  [facility]  09/08/24  11:02 EDT

## 2024-09-09 NOTE — CASE MANAGEMENT/SOCIAL WORK
Case Management Discharge Note      Final Note: Pt discharged to [facility] for skilled care on 9/8.   PATEL Cody RN         Selected Continued Care - Discharged on 9/8/2024 Admission date: 9/3/2024 - Discharge disposition: Skilled Nursing Facility (DC - External)      Destination Coordination complete.      Service Provider Selected Services Address Phone Fax Patient Preferred    [place] [id number] [facility] [id number] [phone] --              Durable Medical Equipment    No services have been selected for the patient.                Dialysis/Infusion    No services have been selected for the patient.                Home Medical Care    No services have been selected for the patient.                Therapy    No services have been selected for the patient.                Community Resources    No services have been selected for the patient.                Community & DME    No services have been selected for the patient.                         Final Discharge Disposition Code: 03 - skilled nursing facility (SNF)

## 2024-09-26 NOTE — PROGRESS NOTES
"Subjective   Patient ID: Carlito Mo is a 69 y.o. male is being seen for consultation today for  -Cerebellar Stroke, acute   CT angiogram head/neck completed on 09/04/2024.    MRI brain w/o contrast completed 09/04/2024.        Carlito Mo is a very pleasant 69-year-old male who used to work as a  in [place] who was recently hospitalized for strokelike symptoms.  He has a significant medical history including ESRD, heart failure, and multiple strokes.  It was discovered at the time of his hospitalization that he was likely noncompliant with his aspirin and Eliquis.  He was also discovered at that time to have severe left vertebral artery stenosis, moderate right vertebral artery origin stenosis, bilateral ICA iCAD, and moderate stenosis of the right  cervical ICA origin.    The patient has had worsening cognition over the past several years.  He is very fatigued and unable to perform ADLs.  Baseline modified Keren scale is 3.  The patient presents with his wife who helps him with activities of daily living.    The following portions of the patient's history were reviewed and updated as appropriate: allergies, current medications, past family history, past medical history, past social history, past surgical history, and problem list.    Review of Systems   All other systems reviewed and are negative.      Objective     Vitals:    10/02/24 0920   BP: 114/66   BP Location: Left arm   Patient Position: Sitting   Cuff Size: Adult   Pulse: 99   Resp: 16   Temp: 97 °F (36.1 °C)   SpO2: 96%   Weight: 63.5 kg (140 lb)   Height: 182.9 cm (72.01\")   PainSc: 0-No pain     Body mass index is 18.98 kg/m².    Lab Results   Component Value Date    WBC 6.30 09/08/2024    HGB 9.7 (L) 09/08/2024    HCT 30.5 (L) 09/08/2024    MCV 87.9 09/08/2024     09/08/2024     Lab Results   Component Value Date    GLUCOSE 121 (H) 09/08/2024    CALCIUM 8.6 09/08/2024     (L) 09/08/2024    K 4.1 09/08/2024    CO2 " "24.0 09/08/2024     09/08/2024    BUN 28 (H) 09/08/2024    CREATININE 2.96 (H) 09/08/2024    EGFR 22.2 (L) 09/08/2024    BCR 9.5 09/08/2024    ANIONGAP 9.0 09/08/2024       Physical Exam:    negative findings: speech normal, patient is cachectic, he is in a wheelchair due to fatigue and inability to ambulate for long distances, he is slow to respond to questions and reports that his memory and cognition are \"fuzzy \", pupils are equal and round and reactive to light, he is 4-5 in all extremities without any focal weakness, he has no pronator drift.    Assessment & Plan       Carlito Mo  reports that he has never smoked. He has never used smokeless tobacco.      Independent Review of Radiographic Studies:      I personally reviewed the images from the following studies.    CTA of the head and neck performed 9/4/2024 at [facility]:  The patient has likely codominant vertebral arteries.  He has bilateral fetal P-coa he has severe stenosis of the origin of the left vertebral artery and moderate stenosis of the origin of the right vertebral artery.  He also has moderate stenosis of his Right proximal internal carotid artery    Medical Decision Making:      I spent 30 minutes caring for Carlito on this date of service. This time includes time spent by me in the following activities:preparing for the visit, reviewing tests, obtaining and/or reviewing a separately obtained history, performing a medically appropriate examination and/or evaluation , counseling and educating the patient/family/caregiver, ordering medications, tests, or procedures, documenting information in the medical record, independently interpreting results and communicating that information with the patient/family/caregiver, and care coordination        Assessment & Plan  Cerebellar stroke, acute    Vertebral artery stenosis, bilateral  As the patient has good intracranial posterior communicating artery supply and likely codominant arteries, and " in the setting of the patient's overall frail health and end-stage renal disease, we will follow-up with noninvasive CTA imaging of the head and neck in 1 year to assess for vertebral arteries stenosis worsening.  The patient has a history of heart failure and was not on his Eliquis when he recently had the stroke.  He has strokes in all territories of his brain which suggest to me that this is likely of embolic origin and probably not symptomatic from the vertebral arteries though I did explain to the patient and his wife that this cannot be determined with certainty.  Carotid stenosis, right  Is this is only 40 to 50% stenosis, at this time it does not meet treatment criteria.  The patient is already on aspirin and Eliquis    Orders Placed This Encounter   Procedures    CT Angiogram Head With Contrast    CT Angiogram Neck          I explained the risks and benefits of proceeding with vertebral artery stenting or angiogram with the patient and his wife who agree with me that conservative management with noninvasive imaging would be the best solution given his overall poor health.  His wife is now giving him his medications daily so he should be compliant with his statin, aspirin, Eliquis, and blood pressure control.      Return in about 1 year (around 10/2/2025) for Follow-up after imaging.     This note was completed using Dragon Dictation software.

## 2024-09-30 ENCOUNTER — TELEPHONE (OUTPATIENT)
Age: 69
End: 2024-09-30

## 2024-09-30 NOTE — TELEPHONE ENCOUNTER
Spoke with patient's spouse. She states he has been bleeding on his arm near his fistula. Unable to discern if this is from his access sites or a wound. Of note, he has canceled his last 3 follow up visits since April. He was due to have an appt today to evaluate his fistula but canceled that as well. He was rescheduled to 10/25. Wife denies any issues with HD treatments. She will return call should any issues arise between now and his next appointment.

## 2024-10-02 ENCOUNTER — OFFICE VISIT (OUTPATIENT)
Dept: NEUROSURGERY | Facility: CLINIC | Age: 69
End: 2024-10-02
Payer: MEDICARE

## 2024-10-02 VITALS
DIASTOLIC BLOOD PRESSURE: 66 MMHG | OXYGEN SATURATION: 96 % | WEIGHT: 140 LBS | RESPIRATION RATE: 16 BRPM | TEMPERATURE: 97 F | HEART RATE: 99 BPM | BODY MASS INDEX: 18.96 KG/M2 | SYSTOLIC BLOOD PRESSURE: 114 MMHG | HEIGHT: 72 IN

## 2024-10-02 DIAGNOSIS — I65.03 VERTEBRAL ARTERY STENOSIS, BILATERAL: ICD-10-CM

## 2024-10-02 DIAGNOSIS — I63.9 CEREBELLAR STROKE, ACUTE: Primary | ICD-10-CM

## 2024-10-02 DIAGNOSIS — I65.21 CAROTID STENOSIS, RIGHT: ICD-10-CM

## 2024-10-02 RX ORDER — BISMUTH SUBSALICYLATE 525 MG/15ML
SUSPENSION ORAL
COMMUNITY

## 2024-10-02 NOTE — ASSESSMENT & PLAN NOTE
Is this is only 40 to 50% stenosis, at this time it does not meet treatment criteria.  The patient is already on aspirin and Eliquis

## 2024-10-02 NOTE — ASSESSMENT & PLAN NOTE
As the patient has good intracranial posterior communicating artery supply and likely codominant arteries, and in the setting of the patient's overall frail health and end-stage renal disease, we will follow-up with noninvasive CTA imaging of the head and neck in 1 year to assess for vertebral arteries stenosis worsening.  The patient has a history of heart failure and was not on his Eliquis when he recently had the stroke.  He has strokes in all territories of his brain which suggest to me that this is likely of embolic origin and probably not symptomatic from the vertebral arteries though I did explain to the patient and his wife that this cannot be determined with certainty.

## 2024-10-25 ENCOUNTER — HOSPITAL ENCOUNTER (OUTPATIENT)
Facility: HOSPITAL | Age: 69
Discharge: HOME | End: 2024-10-25
Payer: MEDICARE

## 2024-10-25 ENCOUNTER — OFFICE VISIT (OUTPATIENT)
Age: 69
End: 2024-10-25
Payer: MEDICARE

## 2024-10-25 VITALS
TEMPERATURE: 97.4 F | WEIGHT: 146.6 LBS | DIASTOLIC BLOOD PRESSURE: 74 MMHG | SYSTOLIC BLOOD PRESSURE: 151 MMHG | BODY MASS INDEX: 19.86 KG/M2 | OXYGEN SATURATION: 98 % | RESPIRATION RATE: 16 BRPM | HEART RATE: 60 BPM | HEIGHT: 72 IN

## 2024-10-25 DIAGNOSIS — N18.6 ESRD (END STAGE RENAL DISEASE): ICD-10-CM

## 2024-10-25 DIAGNOSIS — N18.6 ESRD (END STAGE RENAL DISEASE): Primary | ICD-10-CM

## 2024-10-25 LAB
BH CV VAS DIALYSIS ARTERIAL ANASTOMOSIS DIAMETER: 0.4 CM
BH CV VAS DIALYSIS ARTERIAL ANASTOMOSIS EDV: 181 CM/SEC
BH CV VAS DIALYSIS ARTERIAL ANASTOMOSIS PSV: 622 CM/SEC
BH CV VAS DIALYSIS CONDUIT DIST DEPTH: 0.2 CM
BH CV VAS DIALYSIS CONDUIT DIST DIAMETER: 0.6 CM
BH CV VAS DIALYSIS CONDUIT DIST EDV: 33 CM/SEC
BH CV VAS DIALYSIS CONDUIT DIST PSV: 76 CM/SEC
BH CV VAS DIALYSIS CONDUIT MID DEPTH: 0.5 CM
BH CV VAS DIALYSIS CONDUIT MID DIAMETER: 0.6 CM
BH CV VAS DIALYSIS CONDUIT MID EDV: 27 CM/SEC
BH CV VAS DIALYSIS CONDUIT MID PSV: 76 CM/SEC
BH CV VAS DIALYSIS CONDUIT MID/DIST DEPTH: 0.2 CM
BH CV VAS DIALYSIS CONDUIT MID/DIST DIAMETER: 0.5 CM
BH CV VAS DIALYSIS CONDUIT MID/DIST EDV: 52 CM/SEC
BH CV VAS DIALYSIS CONDUIT MID/DIST FLOW VOL: 495 ML/MIN
BH CV VAS DIALYSIS CONDUIT MID/DIST PSV: 78 CM/SEC
BH CV VAS DIALYSIS CONDUIT PROX DEPTH: 0.2 CM
BH CV VAS DIALYSIS CONDUIT PROX DIAMETER: 0.6 CM
BH CV VAS DIALYSIS CONDUIT PROX EDV: 138 CM/SEC
BH CV VAS DIALYSIS CONDUIT PROX PSV: 258 CM/SEC
BH CV VAS DIALYSIS CONDUIT PROX/MID DEPTH: 0.2 CM
BH CV VAS DIALYSIS CONDUIT PROX/MID DIAMETER: 0.4 CM
BH CV VAS DIALYSIS CONDUIT PROX/MID EDV: 85 CM/SEC
BH CV VAS DIALYSIS CONDUIT PROX/MID FLOW VOL: 402 ML/MIN
BH CV VAS DIALYSIS CONDUIT PROX/MID PSV: 130 CM/SEC
BH CV VAS DIALYSIS PRE-INFLOW RADIAL DIAMETER: 0.3 CM
BH CV VAS DIALYSIS PRE-INFLOW RADIAL EDV: 142 CM/SEC
BH CV VAS DIALYSIS PRE-INFLOW RADIAL FLOW VOL: 308 ML/MIN
BH CV VAS DIALYSIS PRE-INFLOW RADIAL PSV: 296 CM/SEC
BH CV VAS DIALYSIS VENOUS OUTFLOW AXILLARY DIAMETER: 0.9 CM
BH CV VAS DIALYSIS VENOUS OUTFLOW AXILLARY EDV: 29 CM/SEC
BH CV VAS DIALYSIS VENOUS OUTFLOW AXILLARY PSV: 41 CM/SEC
BH CV VAS DIALYSIS VENOUS OUTFLOW BASILIC VEIN DIAMETER: 0.7 CM
BH CV VAS DIALYSIS VENOUS OUTFLOW BASILIC VEIN EDV: 17 CM/SEC
BH CV VAS DIALYSIS VENOUS OUTFLOW BASILIC VEIN PSV: 51 CM/SEC
BH CV VAS DIALYSIS VENOUS OUTFLOW SUBCLAVIAN DIAMETER: 0.9 CM
BH CV VAS DIALYSIS VENOUS OUTFLOW SUBCLAVIAN EDV: 37 CM/SEC
BH CV VAS DIALYSIS VENOUS OUTFLOW SUBCLAVIAN PSV: 48 CM/SEC

## 2024-10-25 PROCEDURE — 93990 DOPPLER FLOW TESTING: CPT

## 2024-10-25 NOTE — PROGRESS NOTES
Chief Complaint  Hemodialysis Access (F/u AVF) and Med Refill (Need refills on Aspirin, atorvastatin, Vit D)    Subjective        Carlito Mo presents to [facility]  HPI   Carlito Mo is a 69 y.o. male that has been followed in our office for hemodialysis access. He has the following access in place: Right radiocephalic AV fistula placed in 2022. His last intervention was 11/2/2023 where he had coil embolization of stealing branches.  There was an additional branch that Dr. Esquivel wanted to pursue, the patient was unable to tolerate the procedure any further. He returns today in follow up along with an AV duplex. He reports he was told by his clinic that he has been having issues, though he cannot tell me exactly what they are.    Review of Systems   Constitutional:  Negative for fever.   Eyes:  Negative for visual disturbance.   Cardiovascular:  Negative for leg swelling.   Gastrointestinal:  Negative for abdominal pain.   Musculoskeletal:  Negative for back pain.   Skin:  Negative for color change, pallor and wound.   Neurological:  Negative for dizziness, facial asymmetry, speech difficulty and weakness.        Carlito Mo  reports that he has never smoked. He has never used smokeless tobacco..        Objective   Vital Signs:  Vitals:    10/25/24 1349   BP: 151/74   Pulse: 60   Resp: 16   Temp: 97.4 °F (36.3 °C)   SpO2: 98%        Body mass index is 19.88 kg/m².       Physical Exam  Vitals reviewed.   Constitutional:       Appearance: Normal appearance.   HENT:      Head: Normocephalic.   Cardiovascular:      Rate and Rhythm: Normal rate and regular rhythm.      Pulses:           Dorsalis pedis pulses are 3+ on the right side and 3+ on the left side.        Posterior tibial pulses are 3+ on the right side and 3+ on the left side.      Arteriovenous access: Right arteriovenous access is present.  Pulmonary:      Effort: Pulmonary effort is normal.   Skin:     General: Skin  is warm.   Neurological:      General: No focal deficit present.      Mental Status: He is alert and oriented to person, place, and time.   Psychiatric:         Mood and Affect: Mood normal.          Result Review :        Duplex from today:Duplex Hemodialysis Access CAR (10/25/2024 13:41)     Previous AVF: Duplex Hemodialysis Access CAR (04/16/2024 09:23)                  Assessment and Plan     Diagnoses and all orders for this visit:    1. ESRD (end stage renal disease) (Primary)  -     Dialysis Circuit Angiography (Fistulogram); Future            Patient presents today for follow-up of his AV fistula.  Today, he has inadequate volume flows.  He reports his clinic has been having difficulty.  I recommended a fistulogram.  We discussed the procedure and he is agreeable to proceed.  We will get this scheduled at his earliest convenience.  Of note, he was ill today and states that his son tested positive for COVID.  His oxygen was 98%.  I offered to assist him to the emergency room, though he refused.       Follow Up     Return for MWF fistulogram .  Patient was given instructions and counseling regarding his condition or for health maintenance advice. Please see specific information pulled into the AVS if appropriate.     JI Marquez

## 2024-11-13 DIAGNOSIS — N18.6 ESRD (END STAGE RENAL DISEASE): Primary | ICD-10-CM

## 2025-01-04 NOTE — THERAPY DISCHARGE NOTE
Patient Name: Carlito Mo  : 1955    MRN: [id number]                              Today's Date: 2021       Admit Date: 2021    Visit Dx:     ICD-10-CM ICD-9-CM   1. Lower abdominal pain  R10.30 789.09   2. Mild shortness of breath  R06.02 786.05   3. Elevated brain natriuretic peptide (BNP) level  R79.89 790.99   4. Stage 3 chronic kidney disease, unspecified whether stage 3a or 3b CKD (HCC)  N18.30 585.3   5. Pleural effusion  J90 511.9     Patient Active Problem List   Diagnosis   • Major depressive disorder with single episode, in partial remission ([facility])   • Other hyperlipidemia   • Vitamin D deficiency   • Erectile dysfunction   • Chronic fatigue   • Type 2 diabetes mellitus with ophthalmic complication ([facility])   • Skin lesion of chest wall   • Slow transit constipation   • Benign prostatic hyperplasia with nocturia   • History of basal cell carcinoma   • High blood pressure associated with diabetes ([facility])   • Acquired hypothyroidism   • Hypertensive urgency   • Recurrent strokes ([facility])   • Urinary retention   • Pneumonia   • Acute on chronic combined systolic and diastolic congestive heart failure ([facility])   • Acute respiratory failure with hypoxia (HCC)   • Suspected sleep apnea   • Pleural effusion   • YAW (obstructive sleep apnea)   • Coronary artery disease involving native coronary artery of native heart without angina pectoris   • Chronically elevated troponin   • Colon cancer screening   • Enlarged lymph nodes   • Functional gait abnormality   • History of myocardial infarction   • Hospital discharge follow-up   • Insomnia   • Iron deficiency anemia   • Major depression, recurrent (HCC)   • Anemia, chronic renal failure, stage 3 (moderate) (HCC)   • Essential hypertension   • Adverse effect of iron and its compounds, initial encounter   • Acute on chronic renal insufficiency   • Debility   • Falls frequently   • Acute pain of right shoulder   • Acute on chronic anemia   • Heme positive  stool   • Neurogenic orthostatic hypotension (HCC)   • Symptomatic anemia   • History of colon polyps   • Helicobacter pylori gastritis   • Esophagitis   • Sleep related hypoxia   • Embolic stroke (HCC)   • Patent foramen ovale   • Pleural effusion, bilateral   • Cardiomyopathy (HCC)   • Lower abdominal pain   • Diarrhea   • CKD (chronic kidney disease) stage 4, GFR 15-29 ml/min (HCC)     Past Medical History:   Diagnosis Date   • Acquired hypothyroidism    • Acute congestive heart failure (HCC)    • Acute respiratory failure with hypoxia (HCC)    • Acute sinusitis    • SYLVAIN (acute kidney injury) (HCC)     on CKD   • Anemia    • Arthritis    • CAD (coronary artery disease)    • Cancer (HCC)     skin   • Chronic combined systolic and diastolic congestive heart failure (HCC)    • Chronic ischemic heart disease    • CKD (chronic kidney disease)    • COPD (chronic obstructive pulmonary disease) (HCC)    • Depression    • Diabetes mellitus type 2, uncontrolled, without complications    • Disease of thyroid gland    • Fatigue    • Frequent PVCs    • Heart attack (HCC)    • History of coronary artery disease     with remote history of bypass surgery in 2001   • History of transfusion    • Hyperglycemia    • Hyperlipidemia    • Hypertension    • Hypertensive encephalopathy    • Mental status change     Acute   • YAW on CPAP    • Pleural effusion    • Pneumonia    • RBBB (right bundle branch block)    • Screening for prostate cancer 2011   • Stroke (HCC)    • TIA (transient ischemic attack)    • Type 2 diabetes mellitus (HCC)    • Urinary retention    • Vitamin D deficiency      Past Surgical History:   Procedure Laterality Date   • APPENDECTOMY N/A 02/14/2016    Dr. Alexey Dodson   • COLONOSCOPY      over 20 years ago.  no polyps    • COLONOSCOPY N/A 9/18/2018    Procedure: COLONOSCOPY;  Surgeon: Jose Enrique Louie MD;  Location: [facility] ENDOSCOPY;  Service: Gastroenterology   • COLONOSCOPY N/A 9/19/2018    IH, EH,  polyps (TAs w/low grade dysplasia)   • COLONOSCOPY N/A 6/1/2020    Procedure: COLONOSCOPY;  Surgeon: Jose Enrique Louie MD;  Location: [facility] ENDOSCOPY;  Service: Gastroenterology;  Laterality: N/A;  Pre op-Anemia, Melena, History of Polyps  Post op-To Cecum/TI, Polyp, Poor Prep   • CORONARY ARTERY BYPASS GRAFT  11/2001   • ENDOSCOPY N/A 5/29/2020    Procedure: ESOPHAGOGASTRODUODENOSCOPY with biopsies;  Surgeon: Amanda Lovelace MD;  Location: [facility] ENDOSCOPY;  Service: Gastroenterology;  Laterality: N/A;  pre-anemia, dark stools  post-esophagitis, hiatal hernia   • ENDOSCOPY N/A 9/15/2020    Procedure: ESOPHAGOGASTRODUODENOSCOPY WITH BIOPSIES;  Surgeon: Jose Enrique Louie MD;  Location: [facility] ENDOSCOPY;  Service: Gastroenterology;  Laterality: N/A;  pre: history of melena and esophagitis  post: mild esophagitis and gastritis, small hiatal hernia   • HERNIA REPAIR Left 12/05/2019   • TONSILLECTOMY     • VASECTOMY        General Information     Row Name 12/20/21 0939          Physical Therapy Time and Intention    Document Type therapy note (daily note)  -     Mode of Treatment individual therapy; physical therapy  -           User Key  (r) = Recorded By, (t) = Taken By, (c) = Cosigned By    Initials Name Provider Type    Jody Nelson, PT Physical Therapist               Mobility     Row Name 12/20/21 0939          Bed Mobility    Bed Mobility supine-sit; sit-supine  -     Supine-Sit [place] (Bed Mobility) independent  -     Sit-Supine [place] (Bed Mobility) independent  -     Row Name 12/20/21 0939          Sit-Stand Transfer    Sit-Stand [place] (Transfers) standby assist; verbal cues  -     Assistive Device (Sit-Stand Transfers) cane, straight  -TONY     Row Name 12/20/21 0939          Gait/Stairs (Locomotion)    [place] Level (Gait) contact guard; standby assist  -     Assistive Device (Gait) cane, straight  -TONY     Distance in Feet (Gait) 200 ft with 2  standing rest breaks  -     Deviations/Abnormal Patterns (Gait) gait speed decreased; stride length decreased; eli decreased  -     Bilateral Gait Deviations forward flexed posture  -           User Key  (r) = Recorded By, (t) = Taken By, (c) = Cosigned By    Initials Name Provider Type    Jody Nelson, PT Physical Therapist               Obj/Interventions    No documentation.                Goals/Plan    No documentation.                Clinical Impression     Row Name 12/20/21 0942          Pain    Additional Documentation Pain Scale: Numbers Pre/Post-Treatment (Group)  -     Row Name 12/20/21 0942          Pain Scale: Numbers Pre/Post-Treatment    Pretreatment Pain Rating 0/10 - no pain  -     Posttreatment Pain Rating 0/10 - no pain  -     Row Name 12/20/21 0942          Plan of Care Review    Outcome Summary Pt was agreeable to therapy. He is ambulating with SB-CGA with STC. He denies interest in using a rolling walker. He states he feels comfortable with the stairs after practicing last session. He hope to d/c home today and is safe to do so. PT will sign off.  -     Row Name 12/20/21 0942          Vital Signs    Post SpO2 (%) 95  -     O2 Delivery Post Treatment room air  -     Row Name 12/20/21 0942          Positioning and Restraints    Pre-Treatment Position in bed  -     Post Treatment Position bed  -     In Bed fowlers; call light within reach; encouraged to call for assist; exit alarm on  -           User Key  (r) = Recorded By, (t) = Taken By, (c) = Cosigned By    Initials Name Provider Type    Jody Nelson, PT Physical Therapist               Outcome Measures     Row Name 12/20/21 0945          How much help from another person do you currently need...    Turning from your back to your side while in flat bed without using bedrails? 4  -     Moving from lying on back to sitting on the side of a flat bed without bedrails? 4  -KH     Moving to and  from a bed to a chair (including a wheelchair)? 4  -KH     Standing up from a chair using your arms (e.g., wheelchair, bedside chair)? 4  -KH     Climbing 3-5 steps with a railing? 3  -KH     To walk in hospital room? 3  -KH     AM-PAC 6 Clicks Score (PT) 22  -KH     Row Name 12/20/21 0945          Functional Assessment    Outcome Measure Options AM-PAC 6 Clicks Basic Mobility (PT)  -           User Key  (r) = Recorded By, (t) = Taken By, (c) = Cosigned By    Initials Name Provider Type    Jody Nelson, PT Physical Therapist                             Physical Therapy Education                 Title: PT OT SLP Therapies (Done)     Topic: Physical Therapy (Done)     Point: Mobility training (Done)     Learning Progress Summary           Patient Acceptance, E, VU by DB at 12/18/2021 1621    Acceptance, E, VU by TG at 12/18/2021 0507    Acceptance, E, VU by DJ at 12/17/2021 0929    Acceptance, E, VU by EM at 12/15/2021 1028                   Point: Home exercise program (Done)     Learning Progress Summary           Patient Acceptance, E, VU by DB at 12/18/2021 1621    Acceptance, E, VU by TG at 12/18/2021 0507                   Point: Body mechanics (Done)     Learning Progress Summary           Patient Acceptance, E, VU by DB at 12/18/2021 1621    Acceptance, E, VU by TG at 12/18/2021 0507    Acceptance, E, VU by DJ at 12/17/2021 0929                   Point: Precautions (Done)     Learning Progress Summary           Patient Acceptance, E, VU by DB at 12/18/2021 1621    Acceptance, E, VU by TG at 12/18/2021 0507    Acceptance, E, VU by DJ at 12/17/2021 0929                               User Key     Initials Effective Dates Name Provider Type Discipline    EM 06/16/21 -  Mary Jo Hernandez, PT Physical Therapist PT    TG 06/16/21 -  Kirstin Almonte, RN Registered Nurse Nurse    DB 06/16/21 -  Theresa De Luna, PT Physical Therapist PT    DJ 10/25/19 -  Leslie Rodriges, PT Physical Therapist PT               PT Recommendation and Plan     Outcome Summary: Pt was agreeable to therapy. He is ambulating with SB-CGA with STC. He denies interest in using a rolling walker. He states he feels comfortable with the stairs after practicing last session. He hope to d/c home today and is safe to do so. PT will sign off.     Time Calculation:    PT Charges     Row Name 12/20/21 1052             Time Calculation    Start Time 0915  -KH      Stop Time 0932  -KH      Time Calculation (min) 17 min  -KH              Time Calculation- PT    Total Timed Code Minutes- PT 17 minute(s)  -KH              Timed Charges    11912 - PT Therapeutic Activity Minutes 17  -KH              Total Minutes    Timed Charges Total Minutes 17  -KH       Total Minutes 17  -KH            User Key  (r) = Recorded By, (t) = Taken By, (c) = Cosigned By    Initials Name Provider Type    Jody Nelson, PT Physical Therapist                  PT G-Codes  Outcome Measure Options: AM-PAC 6 Clicks Basic Mobility (PT)  AM-PAC 6 Clicks Score (PT): 22  AM-PAC 6 Clicks Score (OT): 19    Jody Burns, DENISSE  12/20/2021     72.6

## 2025-02-27 RX ORDER — APIXABAN 2.5 MG/1
2.5 TABLET, FILM COATED ORAL 2 TIMES DAILY
Qty: 60 TABLET | Refills: 11 | Status: SHIPPED | OUTPATIENT
Start: 2025-02-27

## 2025-03-10 ENCOUNTER — APPOINTMENT (OUTPATIENT)
Dept: GENERAL RADIOLOGY | Facility: HOSPITAL | Age: 70
DRG: 314 | End: 2025-03-10
Payer: MEDICARE

## 2025-03-10 ENCOUNTER — HOSPITAL ENCOUNTER (INPATIENT)
Facility: HOSPITAL | Age: 70
LOS: 3 days | Discharge: NURSING, REHAB OR LONG-TERM CARE | DRG: 314 | End: 2025-03-20
Attending: EMERGENCY MEDICINE | Admitting: STUDENT IN AN ORGANIZED HEALTH CARE EDUCATION/TRAINING PROGRAM
Payer: MEDICARE

## 2025-03-10 DIAGNOSIS — Z99.2 ESRD ON DIALYSIS: ICD-10-CM
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
## 2025-03-10 DIAGNOSIS — N18.6 ESRD ON DIALYSIS: ICD-10-CM

## 2025-03-10 DIAGNOSIS — R53.1 GENERALIZED WEAKNESS: Primary | ICD-10-CM

## 2025-03-10 LAB
ALBUMIN SERPL-MCNC: 3.5 G/DL (ref 3.5–5.2)
ALBUMIN/GLOB SERPL: 1 G/DL
ALP SERPL-CCNC: 104 U/L (ref 39–117)
ALT SERPL W P-5'-P-CCNC: 16 U/L (ref 1–41)
ANION GAP SERPL CALCULATED.3IONS-SCNC: 7.7 MMOL/L (ref 5–15)
AST SERPL-CCNC: 17 U/L (ref 1–40)
BASOPHILS # BLD AUTO: 0.02 10*3/MM3 (ref 0–0.2)
BASOPHILS NFR BLD AUTO: 0.3 % (ref 0–1.5)
BILIRUB SERPL-MCNC: 0.4 MG/DL (ref 0–1.2)
BUN SERPL-MCNC: 48 MG/DL (ref 8–23)
BUN/CREAT SERPL: 12.7 (ref 7–25)
CALCIUM SPEC-SCNC: 9.4 MG/DL (ref 8.6–10.5)
CHLORIDE SERPL-SCNC: 96 MMOL/L (ref 98–107)
CO2 SERPL-SCNC: 29.3 MMOL/L (ref 22–29)
CREAT SERPL-MCNC: 3.78 MG/DL (ref 0.76–1.27)
DEPRECATED RDW RBC AUTO: 45.5 FL (ref 37–54)
EGFRCR SERPLBLD CKD-EPI 2021: 16.5 ML/MIN/1.73
EOSINOPHIL # BLD AUTO: 0.14 10*3/MM3 (ref 0–0.4)
EOSINOPHIL NFR BLD AUTO: 2.4 % (ref 0.3–6.2)
ERYTHROCYTE [DISTWIDTH] IN BLOOD BY AUTOMATED COUNT: 14.4 % (ref 12.3–15.4)
GEN 5 1HR TROPONIN T REFLEX: 103 NG/L
GLOBULIN UR ELPH-MCNC: 3.5 GM/DL
GLUCOSE BLDC GLUCOMTR-MCNC: 193 MG/DL (ref 70–130)
GLUCOSE SERPL-MCNC: 332 MG/DL (ref 65–99)
HBA1C MFR BLD: 7.1 % (ref 4.8–5.6)
HCT VFR BLD AUTO: 34.9 % (ref 37.5–51)
HGB BLD-MCNC: 10.9 G/DL (ref 13–17.7)
IMM GRANULOCYTES # BLD AUTO: 0.02 10*3/MM3 (ref 0–0.05)
IMM GRANULOCYTES NFR BLD AUTO: 0.3 % (ref 0–0.5)
LYMPHOCYTES # BLD AUTO: 0.71 10*3/MM3 (ref 0.7–3.1)
LYMPHOCYTES NFR BLD AUTO: 12.2 % (ref 19.6–45.3)
MCH RBC QN AUTO: 26.9 PG (ref 26.6–33)
MCHC RBC AUTO-ENTMCNC: 31.2 G/DL (ref 31.5–35.7)
MCV RBC AUTO: 86.2 FL (ref 79–97)
MONOCYTES # BLD AUTO: 0.35 10*3/MM3 (ref 0.1–0.9)
MONOCYTES NFR BLD AUTO: 6 % (ref 5–12)
NEUTROPHILS NFR BLD AUTO: 4.56 10*3/MM3 (ref 1.7–7)
NEUTROPHILS NFR BLD AUTO: 78.8 % (ref 42.7–76)
NRBC BLD AUTO-RTO: 0 /100 WBC (ref 0–0.2)
NT-PROBNP SERPL-MCNC: 7205 PG/ML (ref 0–900)
PLATELET # BLD AUTO: 246 10*3/MM3 (ref 140–450)
PMV BLD AUTO: 10.6 FL (ref 6–12)
POTASSIUM SERPL-SCNC: 5.2 MMOL/L (ref 3.5–5.2)
PROT SERPL-MCNC: 7 G/DL (ref 6–8.5)
RBC # BLD AUTO: 4.05 10*6/MM3 (ref 4.14–5.8)
SODIUM SERPL-SCNC: 133 MMOL/L (ref 136–145)
TROPONIN T % DELTA: 0
TROPONIN T NUMERIC DELTA: 0 NG/L
TROPONIN T SERPL HS-MCNC: 103 NG/L
WBC NRBC COR # BLD AUTO: 5.8 10*3/MM3 (ref 3.4–10.8)

## 2025-03-10 PROCEDURE — 83880 ASSAY OF NATRIURETIC PEPTIDE: CPT | Performed by: PHYSICIAN ASSISTANT

## 2025-03-10 PROCEDURE — 73502 X-RAY EXAM HIP UNI 2-3 VIEWS: CPT

## 2025-03-10 PROCEDURE — 99285 EMERGENCY DEPT VISIT HI MDM: CPT

## 2025-03-10 PROCEDURE — 82948 REAGENT STRIP/BLOOD GLUCOSE: CPT

## 2025-03-10 PROCEDURE — 85025 COMPLETE CBC W/AUTO DIFF WBC: CPT | Performed by: PHYSICIAN ASSISTANT

## 2025-03-10 PROCEDURE — 63710000001 INSULIN LISPRO (HUMAN) PER 5 UNITS: Performed by: STUDENT IN AN ORGANIZED HEALTH CARE EDUCATION/TRAINING PROGRAM

## 2025-03-10 PROCEDURE — G0378 HOSPITAL OBSERVATION PER HR: HCPCS

## 2025-03-10 PROCEDURE — 93005 ELECTROCARDIOGRAM TRACING: CPT | Performed by: PHYSICIAN ASSISTANT

## 2025-03-10 PROCEDURE — 83036 HEMOGLOBIN GLYCOSYLATED A1C: CPT | Performed by: STUDENT IN AN ORGANIZED HEALTH CARE EDUCATION/TRAINING PROGRAM

## 2025-03-10 PROCEDURE — 93010 ELECTROCARDIOGRAM REPORT: CPT | Performed by: STUDENT IN AN ORGANIZED HEALTH CARE EDUCATION/TRAINING PROGRAM

## 2025-03-10 PROCEDURE — 84484 ASSAY OF TROPONIN QUANT: CPT | Performed by: PHYSICIAN ASSISTANT

## 2025-03-10 PROCEDURE — 36415 COLL VENOUS BLD VENIPUNCTURE: CPT

## 2025-03-10 PROCEDURE — 71045 X-RAY EXAM CHEST 1 VIEW: CPT

## 2025-03-10 PROCEDURE — 63710000001 INSULIN LISPRO (HUMAN) PER 5 UNITS: Performed by: NURSE PRACTITIONER

## 2025-03-10 PROCEDURE — 80053 COMPREHEN METABOLIC PANEL: CPT | Performed by: PHYSICIAN ASSISTANT

## 2025-03-10 RX ORDER — BISACODYL 10 MG
10 SUPPOSITORY, RECTAL RECTAL DAILY PRN
Status: DISCONTINUED | OUTPATIENT
Start: 2025-03-10 | End: 2025-03-16

## 2025-03-10 RX ORDER — SODIUM CHLORIDE 0.9 % (FLUSH) 0.9 %
10 SYRINGE (ML) INJECTION EVERY 12 HOURS SCHEDULED
Status: DISCONTINUED | OUTPATIENT
Start: 2025-03-10 | End: 2025-03-20 | Stop reason: HOSPADM

## 2025-03-10 RX ORDER — ASPIRIN 325 MG
325 TABLET, DELAYED RELEASE (ENTERIC COATED) ORAL DAILY
Status: DISCONTINUED | OUTPATIENT
Start: 2025-03-11 | End: 2025-03-20 | Stop reason: HOSPADM

## 2025-03-10 RX ORDER — AMOXICILLIN 250 MG
2 CAPSULE ORAL 2 TIMES DAILY PRN
Status: DISCONTINUED | OUTPATIENT
Start: 2025-03-10 | End: 2025-03-16

## 2025-03-10 RX ORDER — SODIUM CHLORIDE 0.9 % (FLUSH) 0.9 %
10 SYRINGE (ML) INJECTION AS NEEDED
Status: DISCONTINUED | OUTPATIENT
Start: 2025-03-10 | End: 2025-03-20 | Stop reason: HOSPADM

## 2025-03-10 RX ORDER — ATORVASTATIN CALCIUM 80 MG/1
80 TABLET, FILM COATED ORAL NIGHTLY
Status: DISCONTINUED | OUTPATIENT
Start: 2025-03-10 | End: 2025-03-20 | Stop reason: HOSPADM

## 2025-03-10 RX ORDER — BISACODYL 5 MG/1
5 TABLET, DELAYED RELEASE ORAL DAILY PRN
Status: DISCONTINUED | OUTPATIENT
Start: 2025-03-10 | End: 2025-03-16

## 2025-03-10 RX ORDER — POLYETHYLENE GLYCOL 3350 17 G/17G
17 POWDER, FOR SOLUTION ORAL DAILY PRN
Status: DISCONTINUED | OUTPATIENT
Start: 2025-03-10 | End: 2025-03-16

## 2025-03-10 RX ORDER — DEXTROSE MONOHYDRATE 25 G/50ML
25 INJECTION, SOLUTION INTRAVENOUS
Status: DISCONTINUED | OUTPATIENT
Start: 2025-03-10 | End: 2025-03-20 | Stop reason: HOSPADM

## 2025-03-10 RX ORDER — ONDANSETRON 2 MG/ML
4 INJECTION INTRAMUSCULAR; INTRAVENOUS EVERY 6 HOURS PRN
Status: DISCONTINUED | OUTPATIENT
Start: 2025-03-10 | End: 2025-03-20 | Stop reason: HOSPADM

## 2025-03-10 RX ORDER — IBUPROFEN 600 MG/1
1 TABLET ORAL
Status: DISCONTINUED | OUTPATIENT
Start: 2025-03-10 | End: 2025-03-20 | Stop reason: HOSPADM

## 2025-03-10 RX ORDER — INSULIN LISPRO 100 [IU]/ML
2-7 INJECTION, SOLUTION INTRAVENOUS; SUBCUTANEOUS
Status: DISCONTINUED | OUTPATIENT
Start: 2025-03-10 | End: 2025-03-20 | Stop reason: HOSPADM

## 2025-03-10 RX ORDER — SODIUM CHLORIDE 9 MG/ML
40 INJECTION, SOLUTION INTRAVENOUS AS NEEDED
Status: DISCONTINUED | OUTPATIENT
Start: 2025-03-10 | End: 2025-03-20 | Stop reason: HOSPADM

## 2025-03-10 RX ORDER — NICOTINE POLACRILEX 4 MG
15 LOZENGE BUCCAL
Status: DISCONTINUED | OUTPATIENT
Start: 2025-03-10 | End: 2025-03-20 | Stop reason: HOSPADM

## 2025-03-10 RX ADMIN — INSULIN LISPRO 2 UNITS: 100 INJECTION, SOLUTION INTRAVENOUS; SUBCUTANEOUS at 23:14

## 2025-03-10 RX ADMIN — Medication 10 ML: at 23:13

## 2025-03-10 RX ADMIN — ATORVASTATIN CALCIUM 80 MG: 80 TABLET, FILM COATED ORAL at 23:14

## 2025-03-10 NOTE — ED PROVIDER NOTES
EMERGENCY DEPARTMENT ENCOUNTER  Room Number:  S515/1  PCP: Belle Simons APRN  Independent Historians: Patient      HPI:  Chief Complaint: had concerns including Weakness - Generalized.     A complete HPI/ROS/PMH/PSH/SH/FH are unobtainable due to: None    Chronic or social conditions impacting patient care (Social Determinants of Health): None      Context: Carlito Mo is a 69 y.o. male with a medical history of end-stage renal disease on dialysis, CAD, type 2 diabetes on insulin who presents to the ED c/o acute generalized weakness and fall that occurred earlier today.  Patient states he was walking today when he tripped, landing on the floor.  He denies hitting his head or loss of consciousness.  He is complaining of right hip pain.  Patient was supposed to have dialysis today, receives dialysis every Monday Wednesday and Friday.  He states his last dialysis treatment was on Friday of last week.  He does admit to some increased shortness of breath.  He states he has felt fatigued and generally weak today.  He denies any fever, chills, vomiting, abdominal pain.  He denies any chest pain.      Review of prior external notes (non-ED) -and- Review of prior external test results outside of this encounter:  [facility], last visit on 3/7/2025..    PAST MEDICAL HISTORY  Active Ambulatory Problems     Diagnosis Date Noted    Major depressive disorder with single episode, in partial remission     Other hyperlipidemia     Type 2 diabetes mellitus, with long-term current use of insulin     Vitamin D deficiency 12/17/2015    Erectile dysfunction 12/17/2015    Chronic fatigue 04/25/2016    Type 2 diabetes mellitus with ophthalmic complication 04/25/2016    Benign prostatic hyperplasia with nocturia 12/20/2016    History of basal cell carcinoma 12/20/2016    Acquired hypothyroidism 12/20/2017    Recurrent strokes 02/20/2018    Suspected sleep apnea 10/29/2018    YAW (obstructive sleep  apnea) 12/13/2018    Coronary artery disease involving native coronary artery of native heart without angina pectoris 04/18/2019    Chronically elevated troponin 07/02/2019    Colon cancer screening 10/30/2018    Enlarged lymph nodes 10/30/2018    Functional gait abnormality 10/30/2018    History of myocardial infarction 02/06/2019    Insomnia 02/06/2019    Iron deficiency anemia 10/02/2018    Major depression, recurrent 10/16/2012    Essential hypertension 03/10/2020    Acute on chronic renal insufficiency 05/25/2020    Falls frequently 05/25/2020    Acute on chronic anemia 05/25/2020    Neurogenic orthostatic hypotension 05/30/2020    Anemia, chronic disease 05/25/2020    History of colon polyps 05/25/2020    Helicobacter pylori gastritis 06/04/2020    Esophagitis 06/10/2020    Embolic stroke 05/20/2021    Patent foramen ovale 05/22/2021    Cardiomyopathy 05/24/2021    Diarrhea 12/14/2021    Generalized weakness 03/01/2022    Paroxysmal atrial fibrillation 07/25/2022    Chronic anticoagulation 07/25/2022    Acute ischemic stroke 09/17/2022    History of stroke 05/02/2023    Iron deficiency anemia 05/04/2023    Acute HFrEF (heart failure with reduced ejection fraction) 05/05/2023    ESRD (end stage renal disease) 12/01/2023    Hemoptysis 12/01/2023    Chronic HFrEF (heart failure with reduced ejection fraction) 12/11/2023    Hemodialysis patient 04/10/2024    Exertional dyspnea 05/23/2024    Severe malnutrition 05/29/2024    Ataxia 09/03/2024    Cerebellar stroke, acute 09/04/2024    Medically noncompliant 09/04/2024    Vertebral artery stenosis, bilateral 10/02/2024    Carotid stenosis, right 10/02/2024     Resolved Ambulatory Problems     Diagnosis Date Noted    Anemia     Arthritis     Diabetes mellitus out of control     Acute sinusitis     Accumulation of fluid in tissues 12/17/2015    Fatigue 12/17/2015    Cardiomyopathy, ischemic 12/17/2015    Acute appendicitis 03/02/2016    Skin lesion of chest wall  06/20/2016    Slow transit constipation 09/01/2016    Atherosclerosis of coronary artery 10/16/2012    High blood pressure associated with diabetes 12/20/2016    Altered mental status 11/30/2017    Hypertensive urgency 02/16/2018    Hypertensive encephalopathy syndrome 04/30/2018    Noncompliance w/medication treatment due to intermit use of medication 05/04/2018    Hyperglycemia 05/04/2018    Screening for colorectal cancer 08/22/2018    Constipation 08/22/2018    Urinary retention 09/20/2018    Pneumonia 09/21/2018    Chronic combined systolic and diastolic CHF (congestive heart failure) 09/21/2018    Acute respiratory failure with hypoxia 09/21/2018    Pleural effusion 12/01/2018    Snoring 12/13/2018    Hospital discharge follow-up 05/31/2019    Adverse effect of iron and its compounds, initial encounter 05/23/2020    Debility 05/25/2020    Acute pain of right shoulder 05/27/2020    Lower abdominal pain 05/27/2020    Heme positive stool 05/25/2020    Sleep related hypoxia 07/23/2020    Pleural effusion, bilateral 05/22/2021    Lower abdominal pain 12/13/2021    CKD (chronic kidney disease) stage 4, GFR 15-29 ml/min 12/16/2021    Hypertensive emergency 02/01/2022    Hypertension not at goal 02/02/2022    Memory loss due to medical condition 02/08/2022    ERRONEOUS ENCOUNTER--DISREGARD 03/09/2022    Weakness 06/03/2022    Multiple falls 09/15/2022    Dehydration 09/16/2022    SYLVAIN (acute kidney injury) 09/16/2022    DE LEON (dyspnea on exertion) 03/07/2023    Shortness of breath 03/08/2023    Acute combined systolic and diastolic congestive heart failure 05/02/2023    Atrial fibrillation with rapid ventricular response 11/29/2023    Nausea & vomiting 12/01/2023    Coffee ground emesis 11/28/2023    Severe malnutrition 12/09/2023    Syncope 01/04/2024    Abnormal CT of the chest 01/04/2024     Past Medical History:   Diagnosis Date    Atrial fibrillation     CAD (coronary artery disease)     Chronic combined systolic  and diastolic congestive heart failure     COPD (chronic obstructive pulmonary disease)     Depression     Diabetic neuropathy 04/11/2022    Frequent PVCs     GERD (gastroesophageal reflux disease)     Hyperlipidemia     Hypertension     Hypertensive encephalopathy     Poor historian     RBBB (right bundle branch block)     Stroke     TIA (transient ischemic attack)     Type 2 diabetes mellitus          PAST SURGICAL HISTORY  Past Surgical History:   Procedure Laterality Date    APPENDECTOMY N/A 02/14/2016    Dr. Alexey Dodson    ARTERIOVENOUS FISTULA/SHUNT SURGERY Right 06/03/2022    Procedure: RIGHT FOREARM ARTERIOVENOUS FISTULA PLACEMENT RADIOCEPHALIC WITH CEPHALIC VEIN TRANSPOSITION;  Surgeon: Eliseo Willis MD;  Location: [facility] MAIN OR;  Service: Vascular;  Laterality: Right;    COLONOSCOPY      over 20 years ago.  no polyps     COLONOSCOPY N/A 09/18/2018    Procedure: COLONOSCOPY;  Surgeon: Jose Enrique Louie MD;  Location: [facility] ENDOSCOPY;  Service: Gastroenterology    COLONOSCOPY N/A 09/19/2018    IH, EH, polyps (TAs w/low grade dysplasia)    COLONOSCOPY N/A 06/01/2020    Procedure: COLONOSCOPY;  Surgeon: Jose Enrique Louie MD;  Location: [facility] ENDOSCOPY;  Service: Gastroenterology;  Laterality: N/A;  Pre op-Anemia, Melena, History of Polyps  Post op-To Cecum/TI, Polyp, Poor Prep    CORONARY ARTERY BYPASS GRAFT  11/2001    ENDOSCOPY N/A 05/29/2020    Procedure: ESOPHAGOGASTRODUODENOSCOPY with biopsies;  Surgeon: Amanda Lovelace MD;  Location: [facility] ENDOSCOPY;  Service: Gastroenterology;  Laterality: N/A;  pre-anemia, dark stools  post-esophagitis, hiatal hernia    ENDOSCOPY N/A 09/15/2020    Procedure: ESOPHAGOGASTRODUODENOSCOPY WITH BIOPSIES;  Surgeon: Jose Enrique Louie MD;  Location: [facility] ENDOSCOPY;  Service: Gastroenterology;  Laterality: N/A;  pre: history of melena and esophagitis  post: mild esophagitis and gastritis, small hiatal hernia    ENDOSCOPY N/A 12/4/2023    Procedure:  ESOPHAGOGASTRODUODENOSCOPY with bx;  Surgeon: Quoc Elmore MD;  Location: [facility] ENDOSCOPY;  Service: Gastroenterology;  Laterality: N/A;  pre: Coffee-ground emesis  post: hiatal hernia, esophagitis    HERNIA REPAIR Left 2019    THORACENTESIS      TONSILLECTOMY      VASECTOMY           FAMILY HISTORY  Family History   Problem Relation Age of Onset    Diabetes Mother     Hypertension Mother     Macular degeneration Mother     Alcohol abuse Father     Cancer Father         lung,  age 65    Heart disease Father     Alcohol abuse Brother     Cirrhosis Brother          age 50    Liver cancer Brother 45    Diabetes Son     Kidney failure Son     Autism Son     Malig Hyperthermia Neg Hx          SOCIAL HISTORY  Social History     Socioeconomic History    Marital status:      Spouse name: Lissette    Number of children: 3    Years of education: college   Tobacco Use    Smoking status: Never    Smokeless tobacco: Never    Tobacco comments:     CAFFEINE - OCCAS. SODA   Vaping Use    Vaping status: Never Used   Substance and Sexual Activity    Alcohol use: No     Comment: last drink 2 months ago    Drug use: No    Sexual activity: Defer         ALLERGIES  Prozac [fluoxetine hcl]      REVIEW OF SYSTEMS  Review of Systems   Constitutional:  Positive for fatigue. Negative for chills and fever.   Respiratory:  Negative for cough and shortness of breath.    Cardiovascular:  Negative for chest pain and leg swelling.   Gastrointestinal:  Negative for abdominal pain, nausea and vomiting.   Genitourinary:  Negative for dysuria and flank pain.   Musculoskeletal:  Positive for arthralgias. Negative for back pain and neck pain.   Skin:  Negative for wound.   Neurological:  Negative for numbness and headaches.     Included in HPI  All systems reviewed and negative except for those discussed in HPI.      PHYSICAL EXAM    I have reviewed the triage vital signs and nursing notes.    ED Triage Vitals [03/10/25  1718]   Temp Heart Rate Resp BP SpO2   98 °F (36.7 °C) 64 18 154/68 98 %      Temp src Heart Rate Source Patient Position BP Location FiO2 (%)   Tympanic Monitor Sitting Right arm --       Physical Exam  GENERAL: alert, no acute distress  SKIN: Warm, dry.  He has an AV fistula in his right forearm with a palpable thrill.  Some pallor noted.  HENT: Normocephalic, atraumatic  EYES: no scleral icterus  CV: regular rhythm, regular rate  RESPIRATORY: normal effort, notable rales, rhonchi, or wheezes.  ABDOMEN: soft, nontender, nondistended  MUSCULOSKELETAL: no deformity.  No midline C, T, or L-spine tenderness.  Tenderness of right lateral hip with no notable contusion, leg shortening, or rotation.  Patient is able to lift right leg off of bed.  NEURO: alert, moves all extremities, follows commands      LAB RESULTS  Recent Results (from the past 24 hours)   ECG 12 Lead Dyspnea    Collection Time: 03/10/25  6:53 PM   Result Value Ref Range    QT Interval 454 ms    QTC Interval 456 ms   Comprehensive Metabolic Panel    Collection Time: 03/10/25  6:54 PM    Specimen: Blood   Result Value Ref Range    Glucose 332 (H) 65 - 99 mg/dL    BUN 48 (H) 8 - 23 mg/dL    Creatinine 3.78 (H) 0.76 - 1.27 mg/dL    Sodium 133 (L) 136 - 145 mmol/L    Potassium 5.2 3.5 - 5.2 mmol/L    Chloride 96 (L) 98 - 107 mmol/L    CO2 29.3 (H) 22.0 - 29.0 mmol/L    Calcium 9.4 8.6 - 10.5 mg/dL    Total Protein 7.0 6.0 - 8.5 g/dL    Albumin 3.5 3.5 - 5.2 g/dL    ALT (SGPT) 16 1 - 41 U/L    AST (SGOT) 17 1 - 40 U/L    Alkaline Phosphatase 104 39 - 117 U/L    Total Bilirubin 0.4 0.0 - 1.2 mg/dL    Globulin 3.5 gm/dL    A/G Ratio 1.0 g/dL    BUN/Creatinine Ratio 12.7 7.0 - 25.0    Anion Gap 7.7 5.0 - 15.0 mmol/L    eGFR 16.5 (L) >60.0 mL/min/1.73   High Sensitivity Troponin T    Collection Time: 03/10/25  6:54 PM    Specimen: Blood   Result Value Ref Range    HS Troponin T 103 (C) <22 ng/L   BNP    Collection Time: 03/10/25  6:54 PM    Specimen: Blood    Result Value Ref Range    proBNP 7,205.0 (H) 0.0 - 900.0 pg/mL   CBC Auto Differential    Collection Time: 03/10/25  6:54 PM    Specimen: Blood   Result Value Ref Range    WBC 5.80 3.40 - 10.80 10*3/mm3    RBC 4.05 (L) 4.14 - 5.80 10*6/mm3    Hemoglobin 10.9 (L) 13.0 - 17.7 g/dL    Hematocrit 34.9 (L) 37.5 - 51.0 %    MCV 86.2 79.0 - 97.0 fL    MCH 26.9 26.6 - 33.0 pg    MCHC 31.2 (L) 31.5 - 35.7 g/dL    RDW 14.4 12.3 - 15.4 %    RDW-SD 45.5 37.0 - 54.0 fl    MPV 10.6 6.0 - 12.0 fL    Platelets 246 140 - 450 10*3/mm3    Neutrophil % 78.8 (H) 42.7 - 76.0 %    Lymphocyte % 12.2 (L) 19.6 - 45.3 %    Monocyte % 6.0 5.0 - 12.0 %    Eosinophil % 2.4 0.3 - 6.2 %    Basophil % 0.3 0.0 - 1.5 %    Immature Grans % 0.3 0.0 - 0.5 %    Neutrophils, Absolute 4.56 1.70 - 7.00 10*3/mm3    Lymphocytes, Absolute 0.71 0.70 - 3.10 10*3/mm3    Monocytes, Absolute 0.35 0.10 - 0.90 10*3/mm3    Eosinophils, Absolute 0.14 0.00 - 0.40 10*3/mm3    Basophils, Absolute 0.02 0.00 - 0.20 10*3/mm3    Immature Grans, Absolute 0.02 0.00 - 0.05 10*3/mm3    nRBC 0.0 0.0 - 0.2 /100 WBC   High Sensitivity Troponin T 1Hr    Collection Time: 03/10/25  8:09 PM    Specimen: Arm, Left; Blood   Result Value Ref Range    HS Troponin T 103 (C) <22 ng/L    Troponin T Numeric Delta 0 ng/L    Troponin T % Delta 0 Abnormal if >/= 20%         RADIOLOGY  XR Chest 1 View  Result Date: 3/10/2025  XR CHEST 1 VW-  CLINICAL HISTORY:  fatigue, SOA  FINDINGS:  The lungs are clear of acute infiltrates. The cardiomediastinal silhouette is remarkable for atherosclerotic calcifications within the aortic arch. Sternotomy wires are incidentally noted.      No active disease in the chest.        XR Hip With or Without Pelvis 2 - 3 View Right  Result Date: 3/10/2025  XR HIP W OR WO PELVIS 2-3 VIEW RIGHT-  CLINICAL HISTORY:  Fall, right hip pain  FINDINGS:  Frontal projection of the pelvis and 2 radiographic views of the right hip demonstrate no evidence for acute fracture or bony  malalignment.    This report was finalized on 3/10/2025 8:36 PM by Dr. Matthieu Ortega M.D on Workstation: [id number]          MEDICATIONS GIVEN IN ER  Medications   sodium chloride 0.9 % flush 10 mL (has no administration in time range)   sodium chloride 0.9 % flush 10 mL (has no administration in time range)   sodium chloride 0.9 % infusion 40 mL (has no administration in time range)   ondansetron (ZOFRAN) injection 4 mg (has no administration in time range)   sennosides-docusate (PERICOLACE) 8.6-50 MG per tablet 2 tablet (has no administration in time range)     And   polyethylene glycol (MIRALAX) packet 17 g (has no administration in time range)     And   bisacodyl (DULCOLAX) EC tablet 5 mg (has no administration in time range)     And   bisacodyl (DULCOLAX) suppository 10 mg (has no administration in time range)   aspirin EC tablet 325 mg (has no administration in time range)   atorvastatin (LIPITOR) tablet 80 mg (has no administration in time range)   dextrose (GLUTOSE) oral gel 15 g (has no administration in time range)   dextrose (D50W) (25 g/50 mL) IV injection 25 g (has no administration in time range)   glucagon (GLUCAGEN) injection 1 mg (has no administration in time range)   insulin lispro (HUMALOG/ADMELOG) injection 2-7 Units (has no administration in time range)         ORDERS PLACED DURING THIS VISIT:  Orders Placed This Encounter   Procedures    XR Chest 1 View    XR Hip With or Without Pelvis 2 - 3 View Right    Comprehensive Metabolic Panel    High Sensitivity Troponin T    BNP    CBC Auto Differential    High Sensitivity Troponin T 1Hr    Basic Metabolic Panel    CBC Auto Differential    CK    TSH    Hemoglobin A1c    Diet: Regular/House; Fluid Consistency: Thin (IDDSI 0)    Vital Signs    Up With Assistance    Intake & Output    Weigh Patient    Oral Care    Saline Lock & Maintain IV Access    Place Sequential Compression Device    Maintain Sequential Compression Device    Orthostatic Blood  Pressure    Home medication reconciliation complete.  Please release orders on admission.  Nursing Communication    Code Status and Medical Interventions: CPR (Attempt to Resuscitate); Full Support    [facility] (on-call MD unless specified) Details    Nephrology (on -call MD unless specified)    Inpatient Nephrology Consult    OT Consult: Eval & Treat Discharge Placement Assessment    PT Consult: Eval & Treat Discharge Placement Assessment    POC Glucose 4x Daily Before Meals & at Bedtime    ECG 12 Lead Dyspnea    Insert Peripheral IV    Hemodialysis Inpatient    Initiate Observation Status    CBC & Differential    CBC & Differential         OUTPATIENT MEDICATION MANAGEMENT:  Current Facility-Administered Medications Ordered in Epic   Medication Dose Route Frequency Provider Last Rate Last Admin    [START ON 3/11/2025] aspirin EC tablet 325 mg  325 mg Oral Daily Amanda Martins MD        atorvastatin (LIPITOR) tablet 80 mg  80 mg Oral Nightly Amanda Martins MD        sennosides-docusate (PERICOLACE) 8.6-50 MG per tablet 2 tablet  2 tablet Oral BID PRN Amanda Martins MD        And    polyethylene glycol (MIRALAX) packet 17 g  17 g Oral Daily PRN Amanda Martins MD        And    bisacodyl (DULCOLAX) EC tablet 5 mg  5 mg Oral Daily PRN Amanda Martins MD        And    bisacodyl (DULCOLAX) suppository 10 mg  10 mg Rectal Daily PRN Amanda Martins MD        dextrose (D50W) (25 g/50 mL) IV injection 25 g  25 g Intravenous Q15 Min PRN Amanda Martins MD        dextrose (GLUTOSE) oral gel 15 g  15 g Oral Q15 Min PRN Amanda Martins MD        glucagon (GLUCAGEN) injection 1 mg  1 mg Intramuscular Q15 Min PRN Amanda Martins MD        insulin lispro (HUMALOG/ADMELOG) injection 2-7 Units  2-7 Units Subcutaneous 4x Daily AC & at Bedtime Amanda Martins MD        ondansetron (ZOFRAN) injection 4 mg  4 mg Intravenous Q6H PRN Amanda Martins MD        sodium chloride 0.9 % flush 10 mL  10 mL Intravenous Q12H Amanda Martins MD         sodium chloride 0.9 % flush 10 mL  10 mL Intravenous PRN Amanda Martins MD        sodium chloride 0.9 % infusion 40 mL  40 mL Intravenous PRN Amanda Martins MD         No current [facility]-ordered outpatient medications on file.         PROGRESS, DATA ANALYSIS, CONSULTS, AND MEDICAL DECISION MAKING  All labs have been independently interpreted by me.  All radiology studies have been reviewed by me. All EKG's have been independently viewed and interpreted by me.  Discussion below represents my analysis of pertinent findings related to patient's condition, differential diagnosis, treatment plan and final disposition.    Differential diagnosis includes but is not limited to fracture, contusion, sprain, strain, UTI, pneumonia, metabolic encephalopathy, dehydration, fluid overload, ACS, viral syndrome.      ED Course as of 03/10/25 2253   Mon Mar 10, 2025   1856 My differential diagnosis for generalized weakness includes but is not limited to:  Neuromuscular weakness   CVA  Hemorrhagic stroke  Multiple sclerosis  Amyotrophic Lateral Sclerosis (ALS) (UMN & LMN)  Spinal and bulbar muscular atrophy (Hipolito's syndrome)  Spinal cord disease: Infection (Epidural abscess)  Infarction/ischemia  Trauma (Spinal Cord Syndromes)  Inflammation (Transverse Myelitis)  Degenerative (Spinal muscular atrophy)  Tumor  Peripheral nerve disease: Guillain-Seabrook syndrome  Toxins ([place]a)  Tick paralysis  Diabetic peripheral neuropathy  NMJ disease: Myasthenia gravis crisis  Botulism  Organophosphate toxicity  Lambert-Eaton myasthenic syndrome  Rhabdomyolysis  Dermatomyositis  Polymyositis  Alcoholic myopathy  Non-neuromuscular weakness   ACS  Arrhythmia/Syncope  Severe infection/Sepsis  Hypoglycemia  Periodic paralysis (electrolyte disturbance, K, Mg, Ca)   Hypokalemic periodic paralysis  Thyrotoxic periodic paralysis  Respiratory failure  Symptomatic Anemia  Severe dehydration  Hypothyroidism  Polypharmacy  Malignancy           [JG]   1856  EKG independently viewed and contemporaneously interpreted by ED physician. Time: 1853.  Rate 69.  Interpretation: Normal sinus rhythm, normal axis, right bundle branch block with anterior lateral and inferior Q waves, no acute ST changes. [JG]   1947 HS Troponin T(!!): 103 [AH]   1947 proBNP(!): 7,205.0 [AH]   2105 I called patient's spouse and asked her version of the events from today's fall as well as how he has been overall.  She admits that he has been increasingly weak over the past few days and then had a fall today in which she landed on his right side on the floor.  She denies him hitting his head or loss of consciousness.  She states he has been overall more weak and fatigued and is concerned about his ability to get around on his own.  She agrees his last dialysis was on Friday of last week.  She denies any recent known fever, cough, or vomiting.  We discussed plan for admission given his need for dialysis and weakness over the past few days which led to a fall.  Patient and spouse agree to plan. [AH]   2116 Creatinine(!): 3.78 [AH]   2136 Discussed the patient with Dr. Bland who agrees to admit. []   2152 Discussed the patient with Dr. Montelongo with nephrology who will consult on the patient and put in orders for dialysis tomorrow. []      ED Course User Index  [] Josselin Fajardo PA  [JG] Rhys Cardoza MD             AS OF 22:53 EDT VITALS:    BP - 180/78  HR - 69  TEMP - 98 °F (36.7 °C) (Tympanic)  O2 SATS - 100%    COMPLEXITY OF CARE  The patient requires admission.      DIAGNOSIS  Final diagnoses:   Generalized weakness   ESRD on dialysis   Fall, initial encounter         DISPOSITION  ED Disposition       ED Disposition   Decision to Admit    Condition   --    Comment   Level of Care: Med/Surg [1]   Diagnosis: Generalized weakness [522643]   Admitting Physician: REY BLAND [[id number]]   Attending Physician: REY BLAND [[id number]]   Is patient appropriate for Inpatient Observation  Unit?: Yes [1]                  Please note that portions of this document were completed with a voice recognition program.    Note Disclaimer: At [facility], we believe that sharing information builds trust and better relationships. You are receiving this note because you recently visited [facility]. It is possible you will see health information before a provider has talked with you about it. This kind of information can be easy to misunderstand. To help you fully understand what it means for your health, we urge you to discuss this note with your provider.         Josselin Fajardo PA  03/10/25 2744

## 2025-03-10 NOTE — ED PROVIDER NOTES
MD ATTESTATION NOTE  I supervised care provided by the midlevel provider. We have discussed this patient's history, physical exam, and treatment plan. I have reviewed the midlevel provider's note and I agree with the midlevel provider's findings and plan of care.   SHARED VISIT: This visit was performed by BOTH a physician and an APC. The substantive portion of the medical decision making was performed by this attesting physician who made or approved the management plan and takes responsibility for patient management. All studies in the APC note (if performed) were independently interpreted by me.   I have personally had a face to face encounter with the patient.     PCP: Belle Simons APRN  Patient Care Team:  Belle Simons APRN as PCP - General (Family Medicine)  Amber Murguia MD as Consulting Physician (Cardiology)  Sera La RPH as Pharmacist  Seth Ladd MD as Surgeon (General Surgery)  Kim Hoyos MD PhD as Consulting Physician (Hematology and Oncology)  Jerome Diallo MD as Referring Physician (Family Medicine)  Jose Enrique Louie MD as Consulting Physician (Gastroenterology)  Nima Huddleston MD as Consulting Physician (Nephrology)     Carlito Mo is a 69 y.o. male who presents to the ED c/o weakness and fall.  Patient reports that he tripped and landed on his bottom, denies any head injury, no neck or back pain, does complain of pain in his right hip.  Patient reports that he felt weak afterwards and was unable to get up.  Patient reports that he was due for dialysis today, Monday Wednesday Friday, last dialysis last Friday.  Patient endorses generalized weakness, no focal weakness.  No fever shakes chills or night sweats, no vomiting or diarrhea, no abdominal pain.  Patient denies any chest pain, has felt somewhat short of breath.    On exam:  General: NAD.  Head: NCAT.  ENT: nares patent, no scleral icterus  Neck: Supple, trachea midline.  Cardiac: regular rate and  rhythm.  Lungs: normal effort, clear to auscultation bilaterally  Abdomen: Soft, nondistended, NTTP, no rebound tenderness, no guarding or rigidity.   Extremities: Moves all extremities well, no peripheral edema  Neuro: alert, MAEW, follows commands  Psych: calm, cooperative  Skin: Warm, dry.  AV fistula right upper extremity with palpable thrill no signs of infection.    Medical Decision Making:  After the initial H&P, I discussed pertinent information from history and physical exam with patient/family.  Discussed differential diagnosis.  Discussed plan for ED evaluation/work-up/treatment.  All questions answered.  Patient/family is agreeable with plan.    ED Course as of 03/12/25 1052   Mon Mar 10, 2025   1856 My differential diagnosis for generalized weakness includes but is not limited to:  Neuromuscular weakness   CVA  Hemorrhagic stroke  Multiple sclerosis  Amyotrophic Lateral Sclerosis (ALS) (UMN & LMN)  Spinal and bulbar muscular atrophy (Hipolito's syndrome)  Spinal cord disease: Infection (Epidural abscess)  Infarction/ischemia  Trauma (Spinal Cord Syndromes)  Inflammation (Transverse Myelitis)  Degenerative (Spinal muscular atrophy)  Tumor  Peripheral nerve disease: Guillain-Ple[place]on syndrome  Toxins ([place]a)  Tick paralysis  Diabetic peripheral neuropathy  NMJ disease: Myasthenia gravis crisis  Botulism  Organophosphate toxicity  Lambert-Eaton myasthenic syndrome  Rhabdomyolysis  Dermatomyositis  Polymyositis  Alcoholic myopathy  Non-neuromuscular weakness   ACS  Arrhythmia/Syncope  Severe infection/Sepsis  Hypoglycemia  Periodic paralysis (electrolyte disturbance, K, Mg, Ca)   Hypokalemic periodic paralysis  Thyrotoxic periodic paralysis  Respiratory failure  Symptomatic Anemia  Severe dehydration  Hypothyroidism  Polypharmacy  Malignancy           [JG]   1856 EKG independently viewed and contemporaneously interpreted by ED physician. Time: 1853.  Rate 69.  Interpretation: Normal sinus rhythm, normal  axis, right bundle branch block with anterior lateral and inferior Q waves, no acute ST changes. [JG]   1947 HS Troponin T(!!): 103 [AH]   1947 proBNP(!): 7,205.0 [AH]   2105 I called patient's spouse and asked her version of the events from today's fall as well as how he has been overall.  She admits that he has been increasingly weak over the past few days and then had a fall today in which she landed on his right side on the floor.  She denies him hitting his head or loss of consciousness.  She states he has been overall more weak and fatigued and is concerned about his ability to get around on his own.  She agrees his last dialysis was on Friday of last week.  She denies any recent known fever, cough, or vomiting.  We discussed plan for admission given his need for dialysis and weakness over the past few days which led to a fall.  Patient and spouse agree to plan. [AH]   2116 Creatinine(!): 3.78 []   2136 Discussed the patient with Dr. Martins who agrees to admit. []   2152 Discussed the patient with Dr. Montelongo with nephrology who will consult on the patient and put in orders for dialysis tomorrow. []      ED Course User Index  [] Josselin Fajardo, PA  [J] Rhys Cardoza MD       Diagnosis  Final diagnoses:   Generalized weakness   ESRD on dialysis   Fall, initial encounter          Rhys Cardoza MD  03/10/25 2210       Rhys Cardoza MD  03/12/25 1052

## 2025-03-11 LAB
ANION GAP SERPL CALCULATED.3IONS-SCNC: 9.4 MMOL/L (ref 5–15)
BASOPHILS # BLD AUTO: 0.01 10*3/MM3 (ref 0–0.2)
BASOPHILS NFR BLD AUTO: 0.2 % (ref 0–1.5)
BUN SERPL-MCNC: 48 MG/DL (ref 8–23)
BUN/CREAT SERPL: 14.5 (ref 7–25)
CALCIUM SPEC-SCNC: 8.6 MG/DL (ref 8.6–10.5)
CHLORIDE SERPL-SCNC: 100 MMOL/L (ref 98–107)
CK SERPL-CCNC: 77 U/L (ref 20–200)
CO2 SERPL-SCNC: 27.6 MMOL/L (ref 22–29)
CREAT SERPL-MCNC: 3.3 MG/DL (ref 0.76–1.27)
DEPRECATED RDW RBC AUTO: 47.1 FL (ref 37–54)
EGFRCR SERPLBLD CKD-EPI 2021: 19.4 ML/MIN/1.73
EOSINOPHIL # BLD AUTO: 0.19 10*3/MM3 (ref 0–0.4)
EOSINOPHIL NFR BLD AUTO: 2.9 % (ref 0.3–6.2)
ERYTHROCYTE [DISTWIDTH] IN BLOOD BY AUTOMATED COUNT: 14.8 % (ref 12.3–15.4)
GLUCOSE BLDC GLUCOMTR-MCNC: 199 MG/DL (ref 70–130)
GLUCOSE BLDC GLUCOMTR-MCNC: 217 MG/DL (ref 70–130)
GLUCOSE BLDC GLUCOMTR-MCNC: 220 MG/DL (ref 70–130)
GLUCOSE SERPL-MCNC: 183 MG/DL (ref 65–99)
HCT VFR BLD AUTO: 30.5 % (ref 37.5–51)
HGB BLD-MCNC: 9.3 G/DL (ref 13–17.7)
IMM GRANULOCYTES # BLD AUTO: 0.02 10*3/MM3 (ref 0–0.05)
IMM GRANULOCYTES NFR BLD AUTO: 0.3 % (ref 0–0.5)
LYMPHOCYTES # BLD AUTO: 0.92 10*3/MM3 (ref 0.7–3.1)
LYMPHOCYTES NFR BLD AUTO: 14 % (ref 19.6–45.3)
MCH RBC QN AUTO: 26.6 PG (ref 26.6–33)
MCHC RBC AUTO-ENTMCNC: 30.5 G/DL (ref 31.5–35.7)
MCV RBC AUTO: 87.4 FL (ref 79–97)
MONOCYTES # BLD AUTO: 0.52 10*3/MM3 (ref 0.1–0.9)
MONOCYTES NFR BLD AUTO: 7.9 % (ref 5–12)
NEUTROPHILS NFR BLD AUTO: 4.89 10*3/MM3 (ref 1.7–7)
NEUTROPHILS NFR BLD AUTO: 74.7 % (ref 42.7–76)
NRBC BLD AUTO-RTO: 0 /100 WBC (ref 0–0.2)
PLATELET # BLD AUTO: 213 10*3/MM3 (ref 140–450)
PMV BLD AUTO: 10.2 FL (ref 6–12)
POTASSIUM SERPL-SCNC: 5 MMOL/L (ref 3.5–5.2)
QT INTERVAL: 454 MS
QTC INTERVAL: 456 MS
RBC # BLD AUTO: 3.49 10*6/MM3 (ref 4.14–5.8)
SODIUM SERPL-SCNC: 137 MMOL/L (ref 136–145)
TSH SERPL DL<=0.05 MIU/L-ACNC: 2.81 UIU/ML (ref 0.27–4.2)
WBC NRBC COR # BLD AUTO: 6.55 10*3/MM3 (ref 3.4–10.8)

## 2025-03-11 PROCEDURE — 80048 BASIC METABOLIC PNL TOTAL CA: CPT | Performed by: STUDENT IN AN ORGANIZED HEALTH CARE EDUCATION/TRAINING PROGRAM

## 2025-03-11 PROCEDURE — 63710000001 INSULIN LISPRO (HUMAN) PER 5 UNITS: Performed by: NURSE PRACTITIONER

## 2025-03-11 PROCEDURE — G0378 HOSPITAL OBSERVATION PER HR: HCPCS

## 2025-03-11 PROCEDURE — 82948 REAGENT STRIP/BLOOD GLUCOSE: CPT

## 2025-03-11 PROCEDURE — 63710000001 INSULIN LISPRO (HUMAN) PER 5 UNITS: Performed by: STUDENT IN AN ORGANIZED HEALTH CARE EDUCATION/TRAINING PROGRAM

## 2025-03-11 PROCEDURE — 85025 COMPLETE CBC W/AUTO DIFF WBC: CPT | Performed by: STUDENT IN AN ORGANIZED HEALTH CARE EDUCATION/TRAINING PROGRAM

## 2025-03-11 PROCEDURE — G0257 UNSCHED DIALYSIS ESRD PT HOS: HCPCS

## 2025-03-11 PROCEDURE — 82550 ASSAY OF CK (CPK): CPT | Performed by: STUDENT IN AN ORGANIZED HEALTH CARE EDUCATION/TRAINING PROGRAM

## 2025-03-11 PROCEDURE — 84443 ASSAY THYROID STIM HORMONE: CPT | Performed by: STUDENT IN AN ORGANIZED HEALTH CARE EDUCATION/TRAINING PROGRAM

## 2025-03-11 RX ORDER — LEVOTHYROXINE SODIUM 75 UG/1
75 TABLET ORAL
Status: DISCONTINUED | OUTPATIENT
Start: 2025-03-11 | End: 2025-03-20 | Stop reason: HOSPADM

## 2025-03-11 RX ORDER — TAMSULOSIN HYDROCHLORIDE 0.4 MG/1
0.4 CAPSULE ORAL DAILY
Status: DISCONTINUED | OUTPATIENT
Start: 2025-03-11 | End: 2025-03-20 | Stop reason: HOSPADM

## 2025-03-11 RX ORDER — ALBUMIN (HUMAN) 12.5 G/50ML
12.5 SOLUTION INTRAVENOUS AS NEEDED
Status: ACTIVE | OUTPATIENT
Start: 2025-03-11 | End: 2025-03-12

## 2025-03-11 RX ORDER — PANTOPRAZOLE SODIUM 40 MG/1
40 TABLET, DELAYED RELEASE ORAL
Status: DISCONTINUED | OUTPATIENT
Start: 2025-03-11 | End: 2025-03-20 | Stop reason: HOSPADM

## 2025-03-11 RX ADMIN — INSULIN LISPRO 3 UNITS: 100 INJECTION, SOLUTION INTRAVENOUS; SUBCUTANEOUS at 20:15

## 2025-03-11 RX ADMIN — TAMSULOSIN HYDROCHLORIDE 0.4 MG: 0.4 CAPSULE ORAL at 08:22

## 2025-03-11 RX ADMIN — INSULIN LISPRO 3 UNITS: 100 INJECTION, SOLUTION INTRAVENOUS; SUBCUTANEOUS at 08:22

## 2025-03-11 RX ADMIN — PANTOPRAZOLE SODIUM 40 MG: 40 TABLET, DELAYED RELEASE ORAL at 06:32

## 2025-03-11 RX ADMIN — ATORVASTATIN CALCIUM 80 MG: 80 TABLET, FILM COATED ORAL at 20:14

## 2025-03-11 RX ADMIN — APIXABAN 2.5 MG: 2.5 TABLET, FILM COATED ORAL at 20:15

## 2025-03-11 RX ADMIN — LEVOTHYROXINE SODIUM 75 MCG: 0.07 TABLET ORAL at 06:32

## 2025-03-11 RX ADMIN — Medication 10 ML: at 08:23

## 2025-03-11 RX ADMIN — ASPIRIN 325 MG: 325 TABLET, COATED ORAL at 08:22

## 2025-03-11 RX ADMIN — INSULIN LISPRO 2 UNITS: 100 INJECTION, SOLUTION INTRAVENOUS; SUBCUTANEOUS at 17:25

## 2025-03-11 RX ADMIN — PANTOPRAZOLE SODIUM 40 MG: 40 TABLET, DELAYED RELEASE ORAL at 17:25

## 2025-03-11 RX ADMIN — Medication 10 ML: at 20:15

## 2025-03-11 RX ADMIN — APIXABAN 2.5 MG: 2.5 TABLET, FILM COATED ORAL at 08:22

## 2025-03-11 NOTE — H&P
Patient Name:  Carlito Mo  YOB: 1955  MRN:  [id number]  Admit Date:  3/10/2025  Patient Care Team:  Belle Simons APRN as PCP - General (Family Medicine)  Amber Murguia MD as Consulting Physician (Cardiology)  Sera La BRYAN as Pharmacist  Seth Ladd MD as Surgeon (General Surgery)  Kim Hoyos MD PhD as Consulting Physician (Hematology and Oncology)  Jerome Diallo MD as Referring Physician (Family Medicine)  Jose Enrique Louie MD as Consulting Physician (Gastroenterology)  Nima Huddleston MD as Consulting Physician (Nephrology)      Subjective   History Present Illness     Chief Complaint   Patient presents with   • Weakness - Generalized       Mr. Mo is a 69 y.o. male with a history of ESRD on HD, CAD, type 2 diabetes mellitus, COPD, and history of stroke that presents to [facility] complaining of generalized weakness and a fall.  Patient had a mechanical fall today in which he tripped and landed on his bottom as far as his wife knows. He is not the best historian, so I called his wife to get some collateral information. There was no known loss of consciousness.  Did not hit his head.  His only complaint upon arrival was right hip pain.  Due to his weakness, he had difficulty getting up after the fall.  Patient was scheduled for dialysis today but was not able to make it, so his last dialysis was on Friday.  The patient has otherwise been in his usual state of health other than ongoing weakness.  No fevers or chills.  No night sweats.  No nausea, vomiting, or abdominal pain.  He has had some increased shortness of breath.  In the ER, workup was fairly unrevealing.  Labs consistent with ESRD.  XR of hip was negative.  Given ongoing weakness and potential need for rehab at discharge, the patient is being admitted to [facility] for further management.    Personal History     Past Medical History:   Diagnosis Date   • Acquired hypothyroidism    •  Acute sinusitis    • SYLVAIN (acute kidney injury)     on CKD   • Anemia    • Arthritis    • Atrial fibrillation    • CAD (coronary artery disease)    • Chronic combined systolic and diastolic congestive heart failure    • COPD (chronic obstructive pulmonary disease)    • Depression    • Diabetic neuropathy 04/11/2022    Diabetes mellitus due to underlying condition   • Esophagitis 06/10/2020    Added automatically from request for surgery [id number]     • ESRD (end stage renal disease) 12/01/2023   • Frequent PVCs    • Generalized weakness    • GERD (gastroesophageal reflux disease)    • Helicobacter pylori gastritis 06/04/2020   • Hyperlipidemia    • Hypertension    • Hypertensive encephalopathy    • Pleural effusion    • Pneumonia    • Poor historian    • RBBB (right bundle branch block)    • Stroke     POOR VISION   • TIA (transient ischemic attack)    • Type 2 diabetes mellitus    • Urinary retention    • Vitamin D deficiency      Past Surgical History:   Procedure Laterality Date   • APPENDECTOMY N/A 02/14/2016    Dr. Alexey Dodson   • ARTERIOVENOUS FISTULA/SHUNT SURGERY Right 06/03/2022    Procedure: RIGHT FOREARM ARTERIOVENOUS FISTULA PLACEMENT RADIOCEPHALIC WITH CEPHALIC VEIN TRANSPOSITION;  Surgeon: Eliseo Willis MD;  [facility];  Service: Vascular;  Laterality: Right;   • COLONOSCOPY      over 20 years ago.  no polyps    • COLONOSCOPY N/A 09/18/2018    Procedure: COLONOSCOPY;  Surgeon: Jose Enrique Louie MD;  Location: [facility] ENDOSCOPY;  Service: Gastroenterology   • COLONOSCOPY N/A 09/19/2018    IH, EH, polyps (TAs w/low grade dysplasia)   • COLONOSCOPY N/A 06/01/2020    Procedure: COLONOSCOPY;  Surgeon: Jose Enrique Louie MD;  Location: [facility] ENDOSCOPY;  Service: Gastroenterology;  Laterality: N/A;  Pre op-Anemia, Melena, History of Polyps  Post op-To Cecum/TI, Polyp, Poor Prep   • CORONARY ARTERY BYPASS GRAFT  11/2001   • ENDOSCOPY N/A 05/29/2020    Procedure:  ESOPHAGOGASTRODUODENOSCOPY with biopsies;  Surgeon: Amanda Lovelace MD;  Location:  [facility]COPY;  Service: Gastroenterology;  Laterality: N/A;  pre-anemia, dark stools  post-esophagitis, hiatal hernia   • ENDOSCOPY N/A 09/15/2020    Procedure: ESOPHAGOGASTRODUODENOSCOPY WITH BIOPSIES;  Surgeon: Jose Enrique Louie MD;  Location:  [facility]OS[facility];  Service: Gastroenterology;  Laterality: N/A;  pre: history of melena and esophagitis  post: mild esophagitis and gastritis, small hiatal hernia   • ENDOSCOPY N/A 2023    Procedure: ESOPHAGOGASTRODUODENOSCOPY with bx;  Surgeon: Quoc Elmore MD;  Location:  [facility];  Service: Gastroenterology;  Laterality: N/A;  pre: Coffee-ground emesis  post: hiatal hernia, esophagitis   • HERNIA REPAIR Left 2019   • THORACENTESIS     • TONSILLECTOMY     • VASECTOMY       Family History   Problem Relation Age of Onset   • Diabetes Mother    • Hypertension Mother    • Macular degeneration Mother    • Alcohol abuse Father    • Cancer Father         lung,  age 65   • Heart disease Father    • Alcohol abuse Brother    • Cirrhosis Brother          age 50   • Liver cancer Brother 45   • Diabetes Son    • Kidney failure Son    • Autism Son    • Malig Hyperthermia Neg Hx      Social History     Tobacco Use   • Smoking status: Never   • Smokeless tobacco: Never   • Tobacco comments:     CAFFEINE - OCCAS. SODA   Vaping Use   • Vaping status: Never Used   Substance Use Topics   • Alcohol use: No     Comment: last drink 2 months ago   • Drug use: No     No current facility-administered medications on file prior to encounter.     Current Outpatient Medications on File Prior to Encounter   Medication Sig Dispense Refill   • Anti-Diarrheal 2 MG tablet TAKE ONE TABLET BY MOUTH TWICE DAILY AS NEEDED FOR DIARRHEA (Patient not taking: Reported on 10/25/2024)     • aspirin 325 MG tablet Take 1 tablet by mouth Daily. (Patient not taking: Reported on 10/25/2024)  30 tablet 0   • atorvastatin (LIPITOR) 80 MG tablet Take 1 tablet by mouth Every Night. (Patient not taking: Reported on 10/25/2024) 90 tablet 0   • budesonide-formoterol (SYMBICORT) 160-4.5 MCG/ACT inhaler Inhale 2 puffs 2 (Two) Times a Day. (Patient not taking: Reported on 10/25/2024) 10.2 g 0   • Eliquis 2.5 MG tablet tablet TAKE ONE TABLET BY MOUTH TWICE DAILY 60 tablet 11   • famotidine (PEPCID) 20 MG tablet Take 1 tablet by mouth 2 (Two) Times a Day As Needed for Heartburn. (Patient not taking: Reported on 10/25/2024) 30 tablet 0   • ipratropium-albuterol (COMBIVENT RESPIMAT)  MCG/ACT inhaler Inhale 1 puff 4 (Four) Times a Day As Needed for Wheezing. (Patient not taking: Reported on 10/25/2024) 4 g 0   • levothyroxine (SYNTHROID, LEVOTHROID) 75 MCG tablet Take 1 tablet by mouth Every Morning. 30 tablet 0   • pantoprazole (PROTONIX) 40 MG EC tablet Take 1 tablet by mouth 2 (Two) Times a Day Before Meals. 30 tablet 0   • tamsulosin (FLOMAX) 0.4 MG capsule 24 hr capsule Take 1 capsule by mouth Daily. 30 capsule 0   • vitamin D3 125 MCG (5000 UT) capsule capsule Take 1 capsule by mouth Daily. (Patient not taking: Reported on 10/25/2024) 30 capsule 0     Allergies   Allergen Reactions   • Prozac [Fluoxetine Hcl] Other (See Comments)     shaking       Objective    Objective     Vital Signs  Temp:  [98 °F (36.7 °C)] 98 °F (36.7 °C)  Heart Rate:  [64-69] 69  Resp:  [18] 18  BP: (154-180)/(68-78) 180/78  SpO2:  [98 %-100 %] 100 %  on   ;   Device (Oxygen Therapy): room air  Body mass index is 19.8 kg/m².    Physical Exam  General: Alert, no acute distress. Chronically-ill appearing.   ENT: No conjunctival injection or scleral icterus. Moist mucous membranes.   Neuro: Eyes open and moving in all directions, strength normal and equal in all extremities, face symmetric, cranial nerves intact, no focal deficits.   Lungs: Clear to auscultation bilaterally. No wheeze or crackles. No distress.   Heart: RRR, no murmurs.  No edema.  Abdomen: Soft, non-tender, non-distended. Normal bowel sounds.  Ext: Warm and well-perfused. No edema.   Skin: No rashes or lesions. IV site without swelling or erythema.     Lab Results (last 24 hours)       Procedure Component Value Units Date/Time    CBC & Differential [[id number]]  (Abnormal) Collected: 03/10/25 1854    Specimen: Blood Updated: 03/10/25 1910    Narrative:      The following orders were created for panel order CBC & Differential.  Procedure                               Abnormality         Status                     ---------                               -----------         ------                     CBC Auto Differential[[id number]]        Abnormal            Final result                 Please view results for these tests on the individual orders.    Comprehensive Metabolic Panel [[id number]]  (Abnormal) Collected: 03/10/25 1854    Specimen: Blood Updated: 03/10/25 1931     Glucose 332 mg/dL      BUN 48 mg/dL      Creatinine 3.78 mg/dL      Sodium 133 mmol/L      Potassium 5.2 mmol/L      Chloride 96 mmol/L      CO2 29.3 mmol/L      Calcium 9.4 mg/dL      Total Protein 7.0 g/dL      Albumin 3.5 g/dL      ALT (SGPT) 16 U/L      AST (SGOT) 17 U/L      Alkaline Phosphatase 104 U/L      Total Bilirubin 0.4 mg/dL      Globulin 3.5 gm/dL      A/G Ratio 1.0 g/dL      BUN/Creatinine Ratio 12.7     Anion Gap 7.7 mmol/L      eGFR 16.5 mL/min/1.73     Narrative:      GFR Categories in Chronic Kidney Disease (CKD)      GFR Category          GFR (mL/min/1.73)    Interpretation  G1                     90 or greater         Normal or high (1)  G2                      60-89                Mild decrease (1)  G3a                   45-59                Mild to moderate decrease  G3b                   30-44                Moderate to severe decrease  G4                    15-29                Severe decrease  G5                    14 or less           Kidney failure          (1)In the absence of evidence  of kidney disease, neither GFR category G1 or G2 fulfill the criteria for CKD.    eGFR calculation 2021 CKD-EPI creatinine equation, which does not include race as a factor    High Sensitivity Troponin T [[id number]]  (Abnormal) Collected: 03/10/25 1854    Specimen: Blood Updated: 03/10/25 1932     HS Troponin T 103 ng/L     Narrative:      High Sensitive Troponin T Reference Range:  <14.0 ng/L- Negative Female for AMI  <22.0 ng/L- Negative Male for AMI  >=14 - Abnormal Female indicating possible myocardial injury.  >=22 - Abnormal Male indicating possible myocardial injury.   Clinicians would have to utilize clinical acumen, EKG, Troponin, and serial changes to determine if it is an Acute Myocardial Infarction or myocardial injury due to an underlying chronic condition.         BNP [[id number]]  (Abnormal) Collected: 03/10/25 1854    Specimen: Blood Updated: 03/10/25 1931     proBNP 7,205.0 pg/mL     Narrative:      This assay is used as an aid in the diagnosis of individuals suspected of having heart failure. It can be used as an aid in the diagnosis of acute decompensated heart failure (ADHF) in patients presenting with signs and symptoms of ADHF to the emergency department (ED). In addition, NT-proBNP of <300 pg/mL indicates ADHF is not likely.    Age Range Result Interpretation  NT-proBNP Concentration (pg/mL:      <50             Positive            >450                   Gray                 300-450                    Negative             <300    50-75           Positive            >900                  Gray                300-900                  Negative            <300      >75             Positive            >1800                  Gray                300-1800                  Negative            <300    CBC Auto Differential [[id number]]  (Abnormal) Collected: 03/10/25 1854    Specimen: Blood Updated: 03/10/25 1910     WBC 5.80 10*3/mm3      RBC 4.05 10*6/mm3      Hemoglobin 10.9 g/dL      Hematocrit 34.9 %       MCV 86.2 fL      MCH 26.9 pg      MCHC 31.2 g/dL      RDW 14.4 %      RDW-SD 45.5 fl      MPV 10.6 fL      Platelets 246 10*3/mm3      Neutrophil % 78.8 %      Lymphocyte % 12.2 %      Monocyte % 6.0 %      Eosinophil % 2.4 %      Basophil % 0.3 %      Immature Grans % 0.3 %      Neutrophils, Absolute 4.56 10*3/mm3      Lymphocytes, Absolute 0.71 10*3/mm3      Monocytes, Absolute 0.35 10*3/mm3      Eosinophils, Absolute 0.14 10*3/mm3      Basophils, Absolute 0.02 10*3/mm3      Immature Grans, Absolute 0.02 10*3/mm3      nRBC 0.0 /100 WBC     High Sensitivity Troponin T 1Hr [[id number]]  (Abnormal) Collected: 03/10/25 2009    Specimen: Blood from Arm, Left Updated: 03/10/25 2102     HS Troponin T 103 ng/L      Troponin T Numeric Delta 0 ng/L      Troponin T % Delta 0    Narrative:      High Sensitive Troponin T Reference Range:  <14.0 ng/L- Negative Female for AMI  <22.0 ng/L- Negative Male for AMI  >=14 - Abnormal Female indicating possible myocardial injury.  >=22 - Abnormal Male indicating possible myocardial injury.   Clinicians would have to utilize clinical acumen, EKG, Troponin, and serial changes to determine if it is an Acute Myocardial Infarction or myocardial injury due to an underlying chronic condition.                 Imaging Results (Last 24 Hours)       Procedure Component Value Units Date/Time    XR Chest 1 View [[id number]] Collected: 03/10/25 2045     Updated: 03/10/25 2045    Narrative:      XR CHEST 1 VW-     CLINICAL HISTORY:  fatigue, SOA     FINDINGS:     The lungs are clear of acute infiltrates. The cardiomediastinal  silhouette is remarkable for atherosclerotic calcifications within the  aortic arch. Sternotomy wires are incidentally noted.       Impression:      No active disease in the chest.             XR Hip With or Without Pelvis 2 - 3 View Right [[id number]] Collected: 03/10/25 2031     Updated: 03/10/25 2039    Narrative:      XR HIP W OR WO PELVIS 2-3 VIEW RIGHT-     CLINICAL  HISTORY:  Fall, right hip pain     FINDINGS:     Frontal projection of the pelvis and 2 radiographic views of the right  hip demonstrate no evidence for acute fracture or bony malalignment.           This report was finalized on 3/10/2025 8:36 PM by Dr. Matthieu Ortega M.D  on Workstation: [id number]               Results for orders placed during the hospital encounter of 09/03/24    Adult transthoracic echo complete    Interpretation Summary  •  Left ventricular systolic function is mildly decreased. Calculated left ventricular EF = 44.7%. The following left ventricular wall segments are hypokinetic: apical inferior, mid inferior and apical septal.  •  Left ventricular diastolic function is consistent with (grade Ia w/high LAP) impaired relaxation.  •  There is a focal area of thickening and calcification on the anterior mitral valve without any significant stenosis or regurgitation.  •  Saline test results are negative for right to left atrial level shunt.    ECG 12 Lead Dyspnea   Preliminary Result   HEART RATE=69  bpm   RR Lvulxfos=175  ms   IL Wiueyiaz=403  ms   P Horizontal Axis=23  deg   P Front Axis=0  deg   QRSD Fwyyjwrm=562  ms   QT Ustbnzbz=505  ms   IOyQ=692  ms   QRS Axis=-58  deg   T Wave Axis=47  deg   - ABNORMAL ECG -   Sinus rhythm   Atrial premature complexes   Short IL interval   Right bundle branch block   Date and Time of Study:2025-03-10 18:53:37        Assessment/Plan   Assessment & Plan   Active Hospital Problems    Diagnosis  POA   • **Generalized weakness [R53.1]  Yes   • Chronic HFrEF (heart failure with reduced ejection fraction) [I50.22]  Yes   • ESRD (end stage renal disease) [N18.6]  Yes   • History of stroke [Z86.73]  Not Applicable   • Paroxysmal atrial fibrillation [I48.0]  Yes   • Embolic stroke [I63.9]  Yes   • Essential hypertension [I10]  Yes   • Coronary artery disease involving native coronary artery of native heart without angina pectoris [I25.10]  Yes   • Acquired  hypothyroidism [E03.9]  Yes   • Type 2 diabetes mellitus, with long-term current use of insulin [E11.9, Z79.4]  Not Applicable      Resolved Hospital Problems   No resolved problems to display.       69 y.o. male admitted with Generalized weakness.    Generalized weakness  Fall with right hip pain  -XR right hip negative for fracture  -If pain persists when working with PT, can consider CT scan. At this point, pain seems improved.   -No focal neurologic findings on exam.  Cranial nerves are intact and strength is equal in all extremities.  -Check orthostatics, seems falls may be related to positional changes   -Check CK and TSH  -PT/OT consults    ESRD  Hyponatremia  -Last hemodialysis was Friday 3/7, typically gets dialysis MWF  -Consult nephrology, plan for dialysis tomorrow  -Repeat BMP with morning labs    Hypertension  Coronary artery disease  Elevated troponin  Paroxysmal atrial fibrillation  Chronic diastolic and systolic heart failure  -proBNP elevated but consistent with priors, also in setting of ESRD  -Troponin elevated at 103 but flat, consistent with previous checks and in setting of ESRD  -AC: Eliquis 2.5 mg twice daily  -RC: None    Anemia of ESRD  -Hgb fairly consistent with baseline  -No signs of active bleeding  -Repeat CBC with morning labs, transfuse for Hgb less than 7    History of CVA  -Resume home ASA and statin  -Eliquis as above, stroke embolic in nature    Hypothyroidism  -Check TSH  -Resume home Synthroid    Type 2 diabetes mellitus  -Blood glucose elevated on admission  -Not on long-acting insulin at home  -Start SSI, titrate insulin as needed based on requirements    SCDs for DVT prophylaxis.  Full code.  Discussed with patient and ED provider.      Amanda Martins MD  [facility]  03/10/25  22:20 EDT

## 2025-03-11 NOTE — ED NOTES
"Nursing report ED to floor  Carlito Mo  69 y.o.  male    HPI :  HPI  Stated Reason for Visit: PT called for a lift assist due to increased weakness and falls. PT decided to go to the ER and has no complaints of pain just feels weak  History Obtained From: patient, EMS    Chief Complaint  Chief Complaint   Patient presents with    Weakness - Generalized       Admitting doctor:   Amanda Martins MD    Admitting diagnosis:   The primary encounter diagnosis was Generalized weakness. Diagnoses of ESRD on dialysis and Fall, initial encounter were also pertinent to this visit.    Code status:   Current Code Status       Date Active Code Status Order ID Comments User Context       Prior            Allergies:   Prozac [fluoxetine hcl]    Isolation:   No active isolations    Intake and Output  No intake or output data in the 24 hours ending 03/10/25 2140    Weight:       03/10/25  1718   Weight: 66.2 kg (146 lb)       Most recent vitals:   Vitals:    03/10/25 1718 03/10/25 1926   BP: 154/68 180/78   BP Location: Right arm    Patient Position: Sitting    Pulse: 64 69   Resp: 18    Temp: 98 °F (36.7 °C)    TempSrc: Tympanic    SpO2: 98% 100%   Weight: 66.2 kg (146 lb)    Height: 182.9 cm (72.01\")        Active LDAs/IV Access:   Lines, Drains & Airways       Active LDAs       None                    Labs (abnormal labs have a star):   Labs Reviewed   COMPREHENSIVE METABOLIC PANEL - Abnormal; Notable for the following components:       Result Value    Glucose 332 (*)     BUN 48 (*)     Creatinine 3.78 (*)     Sodium 133 (*)     Chloride 96 (*)     CO2 29.3 (*)     eGFR 16.5 (*)     All other components within normal limits    Narrative:     GFR Categories in Chronic Kidney Disease (CKD)      GFR Category          GFR (mL/min/1.73)    Interpretation  G1                     90 or greater         Normal or high (1)  G2                      60-89                Mild decrease (1)  G3a                   45-59                Mild to " moderate decrease  G3b                   30-44                Moderate to severe decrease  G4                    15-29                Severe decrease  G5                    14 or less           Kidney failure          (1)In the absence of evidence of kidney disease, neither GFR category G1 or G2 fulfill the criteria for CKD.    eGFR calculation 2021 CKD-EPI creatinine equation, which does not include race as a factor   TROPONIN - Abnormal; Notable for the following components:    HS Troponin T 103 (*)     All other components within normal limits    Narrative:     High Sensitive Troponin T Reference Range:  <14.0 ng/L- Negative Female for AMI  <22.0 ng/L- Negative Male for AMI  >=14 - Abnormal Female indicating possible myocardial injury.  >=22 - Abnormal Male indicating possible myocardial injury.   Clinicians would have to utilize clinical acumen, EKG, Troponin, and serial changes to determine if it is an Acute Myocardial Infarction or myocardial injury due to an underlying chronic condition.        BNP (IN-HOUSE) - Abnormal; Notable for the following components:    proBNP 7,205.0 (*)     All other components within normal limits    Narrative:     This assay is used as an aid in the diagnosis of individuals suspected of having heart failure. It can be used as an aid in the diagnosis of acute decompensated heart failure (ADHF) in patients presenting with signs and symptoms of ADHF to the emergency department (ED). In addition, NT-proBNP of <300 pg/mL indicates ADHF is not likely.    Age Range Result Interpretation  NT-proBNP Concentration (pg/mL:      <50             Positive            >450                   Gray                 300-450                    Negative             <300    50-75           Positive            >900                  Gray                300-900                  Negative            <300      >75             Positive            >1800                  Gray                300-1800                   Negative            <300   CBC WITH AUTO DIFFERENTIAL - Abnormal; Notable for the following components:    RBC 4.05 (*)     Hemoglobin 10.9 (*)     Hematocrit 34.9 (*)     MCHC 31.2 (*)     Neutrophil % 78.8 (*)     Lymphocyte % 12.2 (*)     All other components within normal limits   HIGH SENSITIVITIY TROPONIN T 1HR - Abnormal; Notable for the following components:    HS Troponin T 103 (*)     All other components within normal limits    Narrative:     High Sensitive Troponin T Reference Range:  <14.0 ng/L- Negative Female for AMI  <22.0 ng/L- Negative Male for AMI  >=14 - Abnormal Female indicating possible myocardial injury.  >=22 - Abnormal Male indicating possible myocardial injury.   Clinicians would have to utilize clinical acumen, EKG, Troponin, and serial changes to determine if it is an Acute Myocardial Infarction or myocardial injury due to an underlying chronic condition.        CBC AND DIFFERENTIAL    Narrative:     The following orders were created for panel order CBC & Differential.  Procedure                               Abnormality         Status                     ---------                               -----------         ------                     CBC Auto Differential[[id number]]        Abnormal            Final result                 Please view results for these tests on the individual orders.       EKG:   ECG 12 Lead Dyspnea   Preliminary Result   HEART RATE=69  bpm   RR Oxbvznci=910  ms   WI Kdwtpgxr=029  ms   P Horizontal Axis=23  deg   P Front Axis=0  deg   QRSD Xsrmsmaj=341  ms   QT Whcvoqot=073  ms   QMoP=859  ms   QRS Axis=-58  deg   T Wave Axis=47  deg   - ABNORMAL ECG -   Sinus rhythm   Atrial premature complexes   Short WI interval   Right bundle branch block   Date and Time of Study:2025-03-10 18:53:37          Meds given in ED:   Medications - No data to display    Imaging results:  XR Chest 1 View  Result Date: 3/10/2025  No active disease in the chest.          Ambulatory  status:   - x 2 assist      Social issues:   Social History     Socioeconomic History    Marital status:      Spouse name: Lissette    Number of children: 3    Years of education: college   Tobacco Use    Smoking status: Never    Smokeless tobacco: Never    Tobacco comments:     CAFFEINE - OCCAS. SODA   Vaping Use    Vaping status: Never Used   Substance and Sexual Activity    Alcohol use: No     Comment: last drink 2 months ago    Drug use: No    Sexual activity: Defer       Peripheral Neurovascular  Peripheral Neurovascular (Adult)  Peripheral Neurovascular WDL: WDL    Neuro Cognitive  Neuro Cognitive (Adult)  Cognitive/Neuro/Behavioral WDL: .WDL except, level of consciousness, orientation  Level of Consciousness: Alert  Orientation: oriented x 4    Learning  Learning Assessment  Learning Readiness and Ability: cognitive limitation noted    Respiratory  Respiratory WDL  Respiratory WDL: WDL    Abdominal Pain       Pain Assessments  Pain (Adult)  (0-10) Pain Rating: Rest: 0  (0-10) Pain Rating: Activity: 0    NIH Stroke Scale       Jacqui Jim RN  03/10/25 21:40 EDT

## 2025-03-11 NOTE — PLAN OF CARE
Goal Outcome Evaluation:     Problem: Adult Inpatient Plan of Care  Goal: Plan of Care Review  3/11/2025 0033 by Carrie Clifford RN  Outcome: Progressing  Flowsheets (Taken 3/11/2025 0033)  Progress: improving  Plan of Care Reviewed With: patient  3/11/2025 0032 by Carrie Clifford RN  Outcome: Progressing  Goal: Patient-Specific Goal (Individualized)  3/11/2025 0033 by Carrie Clifford RN  Outcome: Progressing  3/11/2025 0032 by Carrie Clifford RN  Outcome: Progressing  Goal: Absence of Hospital-Acquired Illness or Injury  3/11/2025 0033 by Carrie Clifford RN  Outcome: Progressing  3/11/2025 0032 by Carrie Clifford RN  Outcome: Progressing  Intervention: Identify and Manage Fall Risk  Recent Flowsheet Documentation  Taken 3/11/2025 0000 by Carrie Clifford RN  Safety Promotion/Fall Prevention: safety round/check completed  Intervention: Prevent Skin Injury  Recent Flowsheet Documentation  Taken 3/11/2025 0000 by Carrie Clifford RN  Body Position: position changed independently  Intervention: Prevent Infection  Recent Flowsheet Documentation  Taken 3/11/2025 0000 by Carrie Clifford RN  Infection Prevention:   rest/sleep promoted   single patient room provided  Taken 3/10/2025 2300 by Carrie Clifford RN  Infection Prevention:   rest/sleep promoted   single patient room provided  Goal: Optimal Comfort and Wellbeing  3/11/2025 0033 by Carrie Clifford RN  Outcome: Progressing  3/11/2025 0032 by Carrie Clifford RN  Outcome: Progressing  Intervention: Provide Person-Centered Care  Recent Flowsheet Documentation  Taken 3/10/2025 2300 by Carrie Clifford RN  Trust Relationship/Rapport:   care explained   reassurance provided  Goal: Readiness for Transition of Care  3/11/2025 0033 by Carrie Clifford RN  Outcome: Progressing  3/11/2025 0032 by Carrie Clifford RN  Outcome: Progressing  Intervention: Mutually Develop Transition Plan  Recent Flowsheet Documentation  Taken 3/11/2025 0025 by Carrie Clifford RN  Transportation Anticipated: family or friend will  provide  Patient/Family Anticipated Services at Transition: none  Patient/Family Anticipates Transition to: home  Taken 3/11/2025 0024 by Carrie Clifford RN  Transportation Anticipated: family or friend will provide  Patient/Family Anticipates Transition to: home with family  Taken 3/11/2025 0021 by Carrie Clifford RN  Equipment Currently Used at Home: walker, standard  Goal: Plan of Care Review  3/11/2025 0033 by Carrie Clifford RN  Outcome: Progressing  Flowsheets (Taken 3/11/2025 0033)  Progress: improving  Plan of Care Reviewed With: patient  3/11/2025 0032 by Carrie Clifford RN  Outcome: Progressing  Goal: Patient-Specific Goal (Individualized)  3/11/2025 0033 by Carrie Clifford RN  Outcome: Progressing  3/11/2025 0032 by Carrie Clifford RN  Outcome: Progressing  Goal: Absence of Hospital-Acquired Illness or Injury  3/11/2025 0033 by Carrie Clifford RN  Outcome: Progressing  3/11/2025 0032 by Carrie Clifford RN  Outcome: Progressing  Intervention: Identify and Manage Fall Risk  Recent Flowsheet Documentation  Taken 3/11/2025 0000 by Carrie Clifford RN  Safety Promotion/Fall Prevention: safety round/check completed  Intervention: Prevent Skin Injury  Recent Flowsheet Documentation  Taken 3/11/2025 0000 by Carrie Clifford RN  Body Position: position changed independently  Intervention: Prevent Infection  Recent Flowsheet Documentation  Taken 3/11/2025 0000 by Carrie Clifford RN  Infection Prevention:   rest/sleep promoted   single patient room provided  Taken 3/10/2025 2300 by Carrie Clifford RN  Infection Prevention:   rest/sleep promoted   single patient room provided  Goal: Optimal Comfort and Wellbeing  3/11/2025 0033 by Carrie Clifford RN  Outcome: Progressing  3/11/2025 0032 by Carrie Clifford RN  Outcome: Progressing  Intervention: Provide Person-Centered Care  Recent Flowsheet Documentation  Taken 3/10/2025 2300 by Carrie Clifford RN  Trust Relationship/Rapport:   care explained   reassurance provided  Goal: Readiness for Transition of  Care  3/11/2025 0033 by Carrie Clifford RN  Outcome: Progressing  3/11/2025 0032 by Carrie Clifford RN  Outcome: Progressing  Intervention: Mutually Develop Transition Plan  Recent Flowsheet Documentation  Taken 3/11/2025 0025 by Carrie Clifford RN  Transportation Anticipated: family or friend will provide  Patient/Family Anticipated Services at Transition: none  Patient/Family Anticipates Transition to: home  Taken 3/11/2025 0024 by Carrie Clifford RN  Transportation Anticipated: family or friend will provide  Patient/Family Anticipates Transition to: home with family  Taken 3/11/2025 0021 by Carrie Clifford RN  Equipment Currently Used at Home: walker, standard     Problem: Fall Injury Risk  Goal: Absence of Fall and Fall-Related Injury  3/11/2025 0033 by Carrie Clifford RN  Outcome: Progressing  3/11/2025 0032 by Carrie Clifford RN  Outcome: Progressing  Intervention: Identify and Manage Contributors  Recent Flowsheet Documentation  Taken 3/11/2025 0000 by Carrie Clifford RN  Medication Review/Management: medications reviewed  Taken 3/10/2025 2300 by Carrie Clifford RN  Medication Review/Management: medications reviewed  Intervention: Promote Injury-Free Environment  Recent Flowsheet Documentation  Taken 3/11/2025 0000 by Carrie Clifford RN  Safety Promotion/Fall Prevention: safety round/check completed

## 2025-03-11 NOTE — SIGNIFICANT NOTE
03/10/25 2252 03/10/25 2254   Vital Signs   Heart Rate 75 84   Heart Rate Source Monitor Monitor   Resp 18 18   Resp Rate Source Visual Visual   BP (!) 191/80 170/81   Noninvasive MAP (mmHg) 112 104   BP Location Left arm Left arm   BP Method Automatic Automatic   Patient Position Lying Standing     Orthrostatic bps, unable to obtain 2nd standing bp, pt unable to tolerate

## 2025-03-11 NOTE — PLAN OF CARE
Goal Outcome Evaluation:   Pt a/ox4 stand by assist to bed side comode pt dialysis today normal schedule m/w/f orthostatics done this morning doumented and  called pt

## 2025-03-11 NOTE — NURSING NOTE
Dialysis complete, removed 1.5 liters with this treatment and no complications noted.  Pre and post vitals are in epic

## 2025-03-11 NOTE — SIGNIFICANT NOTE
03/11/25 1548   OTHER   Discipline occupational therapist   Rehab Time/Intention   Session Not Performed patient/family declined evaluation   Recommendation   OT - Next Appointment 03/12/25

## 2025-03-11 NOTE — PROGRESS NOTES
Name: Carlito Mo ADMIT: 3/10/2025   : 1955  PCP: Belle Simons APRN    MRN: [id number] LOS: 0 days   AGE/SEX: 69 y.o. male  ROOM: [facility]     Subjective   Subjective   Mr. Mo  is poor historian , and unable to provide any details of what led to his hospitalization.  I am seeing him as he is about to leave for HD.  He is able to verbalize his name, date of birth and that he is on his way to dialysis.  Unfortunately there is no family at bedside at this time.       Objective   Objective   Vital Signs  Temp:  [96.8 °F (36 °C)-98.2 °F (36.8 °C)] 96.8 °F (36 °C)  Heart Rate:  [64-84] 67  Resp:  [16-18] 16  BP: (122-191)/(61-81) 122/61  SpO2:  [98 %-100 %] 98 %  on   ;   Device (Oxygen Therapy): room air  Body mass index is 19.78 kg/m².  Physical Exam  Vitals and nursing note reviewed.   Constitutional:       Appearance: He is ill-appearing.   HENT:      Head: Atraumatic.      Mouth/Throat:      Mouth: Mucous membranes are dry.   Eyes:      Conjunctiva/sclera: Conjunctivae normal.   Cardiovascular:      Rate and Rhythm: Normal rate. Rhythm irregular.      Pulses: Normal pulses.           Radial pulses are 2+ on the right side and 2+ on the left side.      Heart sounds: Normal heart sounds.   Pulmonary:      Effort: Pulmonary effort is normal.   Abdominal:      General: Bowel sounds are normal.      Palpations: Abdomen is soft.   Musculoskeletal:      Right lower leg: Edema present.   Skin:     General: Skin is warm and dry.      Capillary Refill: Capillary refill takes less than 2 seconds.      Coloration: Skin is pale.   Neurological:      General: No focal deficit present.      Mental Status: He is alert. Mental status is at baseline.   Psychiatric:         Mood and Affect: Mood normal.       Results Review     I reviewed the patient's new clinical results.  Results from last 7 days   Lab Units 25  0500 03/10/25  1854 25  0000   WBC 10*3/mm3 6.55 5.80  --    HEMOGLOBIN g/dL 9.3* 10.9* 11.4*   34.2*   PLATELETS 10*3/mm3 213 246  --      Results from last 7 days   Lab Units 03/11/25  0500 03/10/25  1854 03/07/25  0000   SODIUM mmol/L 137 133*  --    POTASSIUM mmol/L 5.0 5.2  --    CHLORIDE mmol/L 100 96*  --    CO2 mmol/L 27.6 29.3*  --    BUN mg/dL 48* 48* 46*   CREATININE mg/dL 3.30* 3.78*  --    GLUCOSE mg/dL 183* 332*  --    EGFR mL/min/1.73 19.4* 16.5*  --      Results from last 7 days   Lab Units 03/10/25  1854   ALBUMIN g/dL 3.5   BILIRUBIN mg/dL 0.4   ALK PHOS U/L 104   AST (SGOT) U/L 17   ALT (SGPT) U/L 16     Results from last 7 days   Lab Units 03/11/25  0500 03/10/25  1854   CALCIUM mg/dL 8.6 9.4   ALBUMIN g/dL  --  3.5       Hemoglobin A1C   Date/Time Value Ref Range Status   03/10/2025 2323 7.10 (H) 4.80 - 5.60 % Final     Glucose   Date/Time Value Ref Range Status   03/11/2025 0557 220 (H) 70 - 130 mg/dL Final   03/10/2025 2300 193 (H) 70 - 130 mg/dL Final       XR Chest 1 View  Result Date: 3/11/2025   No active disease in the chest.    This report was finalized on 3/11/2025 5:41 AM by Dr. Matthieu Ortega M.D on Workstation: [id number]        I have personally reviewed all medications:  Scheduled Medications  apixaban, 2.5 mg, Oral, BID  aspirin, 325 mg, Oral, Daily  atorvastatin, 80 mg, Oral, Nightly  insulin lispro, 2-7 Units, Subcutaneous, 4x Daily AC & at Bedtime  levothyroxine, 75 mcg, Oral, Q AM  pantoprazole, 40 mg, Oral, BID AC  sodium chloride, 10 mL, Intravenous, Q12H  tamsulosin, 0.4 mg, Oral, Daily    Infusions   Diet  Diet: Regular/House; Fluid Consistency: Thin (IDDSI 0)    I have personally reviewed:  [x]  Laboratory   [x]  Microbiology   [x]  Radiology   [x]  EKG/Telemetry  [x]  Cardiology/Vascular   []  Pathology    []  Records       Assessment/Plan     Active Hospital Problems    Diagnosis  POA   • **Generalized weakness [R53.1]  Yes   • Chronic HFrEF (heart failure with reduced ejection fraction) [I50.22]  Yes   • ESRD (end stage renal disease) [N18.6]  Yes   • History  of stroke [Z86.73]  Not Applicable   • Paroxysmal atrial fibrillation [I48.0]  Yes   • Embolic stroke [I63.9]  Yes   • Essential hypertension [I10]  Yes   • Coronary artery disease involving native coronary artery of native heart without angina pectoris [I25.10]  Yes   • Acquired hypothyroidism [E03.9]  Yes   • Type 2 diabetes mellitus, with long-term current use of insulin [E11.9, Z79.4]  Not Applicable      Resolved Hospital Problems   No resolved problems to display.       69 y.o. male admitted with Generalized weakness.    Generalized weakness  Fall with right hip pain at home  Initial x-ray at time of admission was unremarkable for any acute fracture  He currently denies any hip pain  PT OT consulted  Negative  Negative  TSH within normal limits    ESRD  Hemodialysis dependent-last HD on 3/7   Normal sessions Monday, Wednesday, Friday  Nephrology following  HD this a.m.  Will continue to monitor appreciate nephrology's evaluation, assistance and recommendations.  Suspect missing HD because of his fall.    Coronary artery disease  History of CVA-memory deficits noted  Elevated troponin levels  Patient is free of chest pain  Troponin level likely elevated secondary to CKD and missing his HD.  Continue ASA, statin    Proximal atrial fibrillation  Not on rate pharmacological agent for rate control currently  On chronic anticoagulation with Eliquis    HFrEF  Chronic  Most recent echocardiogram LVEF 47.7%  proBNP is elevated, at his baseline and stable.  He appears euvolemic  Continue to monitor I's and O's.  Daily weights    Hypothyroidism  Continue home levothyroxine    BPH  Continue home tamsulosin    Type II DM   Continue Accu-Cheks ACHS  He continues to be hyperglycemic  Not on  home insulin, or oral antidiabetics  Continue SSI for hyperglycemia  Continue glycemia treatment per protocol  A1c 7.10            Eliquis (home med) for DVT prophylaxis.  Full code.  Discussed with patient, nursing staff, and care team  on multidisciplinary rounds.  Anticipate discharge home later this week.  Expected discharge date/ time has not been documented.      JI Hurtado  [facility]  03/11/25  14:34 EDT

## 2025-03-11 NOTE — CONSULTS
[facility] Consult Note      Patient Name: Carlito Mo  : 1955  MRN: [id number]  Primary Care Physician:  Belle Simons, APRN  Referring Physician: Amanda Martins MD  Date of admission: 3/10/2025    Subjective     Reason for Consult:  ESRD    HPI:   Carlito Mo is a 69 y.o. male w ESRD, CAD, CVA & AFIB on AC (eliquis), COPD who presented last night with gen weakness and a fall . C/O Rt hip pain . No LOC.  Last dialysis was FRI.  Hip x ray NEG.  K is 5.2, BUN/Cr 48/3.8 with bicarb 29.  Hgb 9.3.  BP elevated to 190/80 last night, better 138/79 this AM.  CXR normal.  Not on antihypertensives currently - looks like coreg was stopped while hospitalized in 2024 (for dizziness post dialysis) . Found to have left cerebellar infarct then, deemed cardiogenic in relation . His speech and mentation are slow.  Denies dyspnea.  Dialysis access is distal RUE AVF.      Review of Systems:   14 point review of systems is otherwise negative except for mentioned above on HPI    Personal History     Past Medical History:   Diagnosis Date   • Acquired hypothyroidism    • Acute sinusitis    • SYLVAIN (acute kidney injury)     on CKD   • Anemia    • Arthritis    • Atrial fibrillation    • CAD (coronary artery disease)    • Chronic combined systolic and diastolic congestive heart failure    • COPD (chronic obstructive pulmonary disease)    • Depression    • Diabetic neuropathy 2022    Diabetes mellitus due to underlying condition   • Esophagitis 06/10/2020    Added automatically from request for surgery [id number]     • ESRD (end stage renal disease) 2023   • Frequent PVCs    • Generalized weakness    • GERD (gastroesophageal reflux disease)    • Helicobacter pylori gastritis 2020   • Hyperlipidemia    • Hypertension    • Hypertensive encephalopathy    • Pleural effusion    • Pneumonia    • Poor historian    • RBBB (right bundle branch block)    • Stroke     POOR VISION   • TIA  (transient ischemic attack)    • Type 2 diabetes mellitus    • Urinary retention    • Vitamin D deficiency        Past Surgical History:   Procedure Laterality Date   • APPENDECTOMY N/A 02/14/2016    Dr. Alexey Dodson   • ARTERIOVENOUS FISTULA/SHUNT SURGERY Right 06/03/2022    Procedure: RIGHT FOREARM ARTERIOVENOUS FISTULA PLACEMENT RADIOCEPHALIC WITH CEPHALIC VEIN TRANSPOSITION;  Surgeon: Eliseo Willis MD;  Location: [facility] MAIN OR;  Service: Vascular;  Laterality: Right;   • COLONOSCOPY      over 20 years ago.  no polyps    • COLONOSCOPY N/A 09/18/2018    Procedure: COLONOSCOPY;  Surgeon: Jose Enrique Louie MD;  Location: [facility] ENDOSCOPY;  Service: Gastroenterology   • COLONOSCOPY N/A 09/19/2018    IH, EH, polyps (TAs w/low grade dysplasia)   • COLONOSCOPY N/A 06/01/2020    Procedure: COLONOSCOPY;  Surgeon: Jose Enrique Louie MD;  Location: [facility] ENDOSCOPY;  Service: Gastroenterology;  Laterality: N/A;  Pre op-Anemia, Melena, History of Polyps  Post op-To Cecum/TI, Polyp, Poor Prep   • CORONARY ARTERY BYPASS GRAFT  11/2001   • ENDOSCOPY N/A 05/29/2020    Procedure: ESOPHAGOGASTRODUODENOSCOPY with biopsies;  Surgeon: Amanda Lovelace MD;  Location: [facility] ENDOSCOPY;  Service: Gastroenterology;  Laterality: N/A;  pre-anemia, dark stools  post-esophagitis, hiatal hernia   • ENDOSCOPY N/A 09/15/2020    Procedure: ESOPHAGOGASTRODUODENOSCOPY WITH BIOPSIES;  Surgeon: Jose Enrique Louie MD;  Location: [facility] ENDOSCOPY;  Service: Gastroenterology;  Laterality: N/A;  pre: history of melena and esophagitis  post: mild esophagitis and gastritis, small hiatal hernia   • ENDOSCOPY N/A 12/4/2023    Procedure: ESOPHAGOGASTRODUODENOSCOPY with bx;  Surgeon: Quoc Elmore MD;  Location: [facility] ENDOSCOPY;  Service: Gastroenterology;  Laterality: N/A;  pre: Coffee-ground emesis  post: hiatal hernia, esophagitis   • HERNIA REPAIR Left 12/05/2019   • THORACENTESIS     • TONSILLECTOMY     • VASECTOMY          Family History: family history includes Alcohol abuse in his brother and father; Autism in his son; Cancer in his father; Cirrhosis in his brother; Diabetes in his mother and son; Heart disease in his father; Hypertension in his mother; Kidney failure in his son; Liver cancer (age of onset: 45) in his brother; Macular degeneration in his mother.    Social History:  reports that he has never smoked. He has never used smokeless tobacco. He reports that he does not drink alcohol and does not use drugs.    Home Medications:  Prior to Admission medications    Medication Sig Start Date End Date Taking? Authorizing Provider   Anti-Diarrheal 2 MG tablet TAKE ONE TABLET BY MOUTH TWICE DAILY AS NEEDED FOR DIARRHEA  Patient not taking: Reported on 10/25/2024    Provider, MD Alonzo   aspirin 325 MG tablet Take 1 tablet by mouth Daily.  Patient not taking: Reported on 10/25/2024 9/9/24   Irene Barraza APRN   atorvastatin (LIPITOR) 80 MG tablet Take 1 tablet by mouth Every Night.  Patient not taking: Reported on 10/25/2024 9/8/24   Irene Barraza APRN   budesonide-formoterol (SYMBICORT) 160-4.5 MCG/ACT inhaler Inhale 2 puffs 2 (Two) Times a Day.  Patient not taking: Reported on 10/25/2024 9/8/24   Irene Barraza APRN   Eliquis 2.5 MG tablet tablet TAKE ONE TABLET BY MOUTH TWICE DAILY 2/27/25   Nida Lopes APRN   famotidine (PEPCID) 20 MG tablet Take 1 tablet by mouth 2 (Two) Times a Day As Needed for Heartburn.  Patient not taking: Reported on 10/25/2024 9/8/24   Irene Barraza APRN   ipratropium-albuterol (COMBIVENT RESPIMAT)  MCG/ACT inhaler Inhale 1 puff 4 (Four) Times a Day As Needed for Wheezing.  Patient not taking: Reported on 10/25/2024 9/8/24   Irene Barraza APRN   levothyroxine (SYNTHROID, LEVOTHROID) 75 MCG tablet Take 1 tablet by mouth Every Morning. 9/9/24   Irene Barraza APRN   pantoprazole (PROTONIX) 40 MG EC tablet Take 1 tablet by mouth 2 (Two) Times a Day Before  Meals. 9/8/24   Irene Barraza APRN   tamsulosin (FLOMAX) 0.4 MG capsule 24 hr capsule Take 1 capsule by mouth Daily. 9/8/24   Irene Barraza APRN   vitamin D3 125 MCG (5000 UT) capsule capsule Take 1 capsule by mouth Daily.  Patient not taking: Reported on 10/25/2024 9/8/24   Irene Barraza APRN       Allergies:  Allergies   Allergen Reactions   • Prozac [Fluoxetine Hcl] Other (See Comments)     shaking       Objective     Vitals:   Temp:  [97.7 °F (36.5 °C)-98.2 °F (36.8 °C)] 98.2 °F (36.8 °C)  Heart Rate:  [64-84] 66  Resp:  [18] 18  BP: (138-191)/(68-81) 138/79    Intake/Output Summary (Last 24 hours) at 3/11/2025 0645  Last data filed at 3/11/2025 0500  Gross per 24 hour   Intake --   Output 375 ml   Net -375 ml       Physical Exam:    General Appearance: frail WM no distress, on room air  Skin: warm and dry  HEENT: oral mucosa normal, nonicteric sclera  Neck: supple, no JVD  Lungs: CTA bilat no rales  Heart: RRR, normal S1 and S2  Abdomen: soft, nontender, nondistended  : no palpable bladder  Extremities: no edema, RUE distal AVF +T/B  Neuro: speech & mentation slow     Scheduled Meds:     apixaban, 2.5 mg, Oral, BID  aspirin, 325 mg, Oral, Daily  atorvastatin, 80 mg, Oral, Nightly  insulin lispro, 2-7 Units, Subcutaneous, 4x Daily AC & at Bedtime  levothyroxine, 75 mcg, Oral, Q AM  pantoprazole, 40 mg, Oral, BID AC  sodium chloride, 10 mL, Intravenous, Q12H  tamsulosin, 0.4 mg, Oral, Daily      IV Meds:        Results Reviewed:   I have personally reviewed the results from the time of this admission to 3/11/2025 06:45 EDT     Lab Results   Component Value Date    GLUCOSE 183 (H) 03/11/2025    CALCIUM 8.6 03/11/2025     03/11/2025    K 5.0 03/11/2025    CO2 27.6 03/11/2025     03/11/2025    BUN 48 (H) 03/11/2025    CREATININE 3.30 (H) 03/11/2025    EGFRIFAFRI 23 (L) 09/27/2022    EGFRIFNONA 19 (L) 02/04/2022    BCR 14.5 03/11/2025    ANIONGAP 9.4 03/11/2025      Lab Results    Component Value Date    MG 2.0 05/24/2024    PHOS 2.8 09/08/2024    ALBUMIN 3.5 03/10/2025           Assessment / Plan     ASSESSMENT:  ESRD - HD MWF via RUE AVF.  Last dialysis FRI.  K generous 5.0.  Bicarb & waste products stable.  Appears fairly euvolemic - will recheck orthostatics this AM to help guide ultrafiltration goal  HTN of ESRD - initial BP's high, did drop 190 to 170 standing, and was 138/79 lying overnight.  Coreg stopped ~ 6 mos ago   S/P fall with generalized weakness   DM2, mgmt per primary team  Hx AFIB & CVA on AC (eliquis)   BPH, on flomax, which can cause orthostasis  Anemia of CKD, hgb 9.3, likely on long acting HAI at dialysis      PLAN:  HD today (and likely again on THURS)  Recheck orthostatics before dialysis, d/w RN  PT/OT yaniv     Thank you for involving us in the care of Carlito Mo.  Please feel free to call with any questions.    Jose Enrique Montelongo MD  03/11/25  06:45 EDT    [facility]  [phone]

## 2025-03-12 LAB
ALBUMIN SERPL-MCNC: 3.3 G/DL (ref 3.5–5.2)
ANION GAP SERPL CALCULATED.3IONS-SCNC: 9 MMOL/L (ref 5–15)
BASOPHILS # BLD AUTO: 0.01 10*3/MM3 (ref 0–0.2)
BASOPHILS NFR BLD AUTO: 0.2 % (ref 0–1.5)
BUN SERPL-MCNC: 37 MG/DL (ref 8–23)
BUN/CREAT SERPL: 13.1 (ref 7–25)
CALCIUM SPEC-SCNC: 8.5 MG/DL (ref 8.6–10.5)
CHLORIDE SERPL-SCNC: 104 MMOL/L (ref 98–107)
CO2 SERPL-SCNC: 24 MMOL/L (ref 22–29)
CREAT SERPL-MCNC: 2.82 MG/DL (ref 0.76–1.27)
DEPRECATED RDW RBC AUTO: 46.7 FL (ref 37–54)
EGFRCR SERPLBLD CKD-EPI 2021: 23.5 ML/MIN/1.73
EOSINOPHIL # BLD AUTO: 0.19 10*3/MM3 (ref 0–0.4)
EOSINOPHIL NFR BLD AUTO: 3.1 % (ref 0.3–6.2)
ERYTHROCYTE [DISTWIDTH] IN BLOOD BY AUTOMATED COUNT: 14.7 % (ref 12.3–15.4)
GLUCOSE BLDC GLUCOMTR-MCNC: 229 MG/DL (ref 70–130)
GLUCOSE BLDC GLUCOMTR-MCNC: 248 MG/DL (ref 70–130)
GLUCOSE BLDC GLUCOMTR-MCNC: 95 MG/DL (ref 70–130)
GLUCOSE BLDC GLUCOMTR-MCNC: 97 MG/DL (ref 70–130)
GLUCOSE SERPL-MCNC: 96 MG/DL (ref 65–99)
HBV SURFACE AB SER RIA-ACNC: REACTIVE
HBV SURFACE AG SERPL QL IA: NORMAL
HCT VFR BLD AUTO: 29.3 % (ref 37.5–51)
HGB BLD-MCNC: 8.9 G/DL (ref 13–17.7)
IMM GRANULOCYTES # BLD AUTO: 0.03 10*3/MM3 (ref 0–0.05)
IMM GRANULOCYTES NFR BLD AUTO: 0.5 % (ref 0–0.5)
LYMPHOCYTES # BLD AUTO: 0.86 10*3/MM3 (ref 0.7–3.1)
LYMPHOCYTES NFR BLD AUTO: 13.9 % (ref 19.6–45.3)
MCH RBC QN AUTO: 26.6 PG (ref 26.6–33)
MCHC RBC AUTO-ENTMCNC: 30.4 G/DL (ref 31.5–35.7)
MCV RBC AUTO: 87.5 FL (ref 79–97)
MONOCYTES # BLD AUTO: 0.59 10*3/MM3 (ref 0.1–0.9)
MONOCYTES NFR BLD AUTO: 9.5 % (ref 5–12)
NEUTROPHILS NFR BLD AUTO: 4.5 10*3/MM3 (ref 1.7–7)
NEUTROPHILS NFR BLD AUTO: 72.8 % (ref 42.7–76)
NRBC BLD AUTO-RTO: 0 /100 WBC (ref 0–0.2)
PHOSPHATE SERPL-MCNC: 3.2 MG/DL (ref 2.5–4.5)
PLATELET # BLD AUTO: 209 10*3/MM3 (ref 140–450)
PMV BLD AUTO: 10.5 FL (ref 6–12)
POTASSIUM SERPL-SCNC: 4.7 MMOL/L (ref 3.5–5.2)
RBC # BLD AUTO: 3.35 10*6/MM3 (ref 4.14–5.8)
SODIUM SERPL-SCNC: 137 MMOL/L (ref 136–145)
WBC NRBC COR # BLD AUTO: 6.18 10*3/MM3 (ref 3.4–10.8)

## 2025-03-12 PROCEDURE — 87340 HEPATITIS B SURFACE AG IA: CPT | Performed by: STUDENT IN AN ORGANIZED HEALTH CARE EDUCATION/TRAINING PROGRAM

## 2025-03-12 PROCEDURE — G0378 HOSPITAL OBSERVATION PER HR: HCPCS

## 2025-03-12 PROCEDURE — 86704 HEP B CORE ANTIBODY TOTAL: CPT | Performed by: STUDENT IN AN ORGANIZED HEALTH CARE EDUCATION/TRAINING PROGRAM

## 2025-03-12 PROCEDURE — 80069 RENAL FUNCTION PANEL: CPT | Performed by: INTERNAL MEDICINE

## 2025-03-12 PROCEDURE — 82948 REAGENT STRIP/BLOOD GLUCOSE: CPT

## 2025-03-12 PROCEDURE — 97166 OT EVAL MOD COMPLEX 45 MIN: CPT

## 2025-03-12 PROCEDURE — 63710000001 INSULIN LISPRO (HUMAN) PER 5 UNITS: Performed by: NURSE PRACTITIONER

## 2025-03-12 PROCEDURE — 97162 PT EVAL MOD COMPLEX 30 MIN: CPT

## 2025-03-12 PROCEDURE — 86706 HEP B SURFACE ANTIBODY: CPT | Performed by: STUDENT IN AN ORGANIZED HEALTH CARE EDUCATION/TRAINING PROGRAM

## 2025-03-12 PROCEDURE — 97535 SELF CARE MNGMENT TRAINING: CPT

## 2025-03-12 PROCEDURE — 85025 COMPLETE CBC W/AUTO DIFF WBC: CPT | Performed by: NURSE PRACTITIONER

## 2025-03-12 PROCEDURE — 97110 THERAPEUTIC EXERCISES: CPT

## 2025-03-12 PROCEDURE — 97530 THERAPEUTIC ACTIVITIES: CPT

## 2025-03-12 RX ADMIN — TAMSULOSIN HYDROCHLORIDE 0.4 MG: 0.4 CAPSULE ORAL at 08:37

## 2025-03-12 RX ADMIN — ASPIRIN 325 MG: 325 TABLET, COATED ORAL at 08:37

## 2025-03-12 RX ADMIN — LEVOTHYROXINE SODIUM 75 MCG: 0.07 TABLET ORAL at 05:19

## 2025-03-12 RX ADMIN — APIXABAN 2.5 MG: 2.5 TABLET, FILM COATED ORAL at 08:37

## 2025-03-12 RX ADMIN — PANTOPRAZOLE SODIUM 40 MG: 40 TABLET, DELAYED RELEASE ORAL at 08:37

## 2025-03-12 RX ADMIN — INSULIN LISPRO 3 UNITS: 100 INJECTION, SOLUTION INTRAVENOUS; SUBCUTANEOUS at 16:45

## 2025-03-12 RX ADMIN — PANTOPRAZOLE SODIUM 40 MG: 40 TABLET, DELAYED RELEASE ORAL at 16:45

## 2025-03-12 RX ADMIN — ATORVASTATIN CALCIUM 80 MG: 80 TABLET, FILM COATED ORAL at 21:14

## 2025-03-12 RX ADMIN — INSULIN LISPRO 3 UNITS: 100 INJECTION, SOLUTION INTRAVENOUS; SUBCUTANEOUS at 11:33

## 2025-03-12 RX ADMIN — Medication 10 ML: at 21:14

## 2025-03-12 RX ADMIN — APIXABAN 2.5 MG: 2.5 TABLET, FILM COATED ORAL at 21:14

## 2025-03-12 RX ADMIN — Medication 10 ML: at 08:37

## 2025-03-12 NOTE — PLAN OF CARE
Pt. is A&O X 4. No complain of pain. Pt. is a hemodialysis patient. AC&HS. Insulin scheduled given at night. See MAR. Pt. had BM at night. See Flowsheets. VSS. RA. Commode at bedside. Call light within reach. Pt. is planning to DC today.     Goal Outcome Evaluation:  Plan of Care Reviewed With: patient  Progress: improving     Problem: Adult Inpatient Plan of Care  Goal: Plan of Care Review  Outcome: Progressing  Flowsheets (Taken 3/12/2025 0448)  Progress: improving  Plan of Care Reviewed With: patient  Goal: Patient-Specific Goal (Individualized)  Outcome: Progressing  Goal: Absence of Hospital-Acquired Illness or Injury  Outcome: Progressing  Intervention: Identify and Manage Fall Risk  Recent Flowsheet Documentation  Taken 3/12/2025 0438 by Freddy Watkins RN  Safety Promotion/Fall Prevention:   safety round/check completed   room organization consistent   lighting adjusted   fall prevention program maintained   clutter free environment maintained   activity supervised  Taken 3/12/2025 0211 by Freddy Watkins RN  Safety Promotion/Fall Prevention:   safety round/check completed   room organization consistent   lighting adjusted   fall prevention program maintained   clutter free environment maintained   activity supervised  Taken 3/12/2025 0000 by Freddy Watkins RN  Safety Promotion/Fall Prevention:   safety round/check completed   room organization consistent   lighting adjusted   fall prevention program maintained   clutter free environment maintained   activity supervised  Taken 3/11/2025 2200 by Freddy Watkins RN  Safety Promotion/Fall Prevention:   safety round/check completed   room organization consistent   lighting adjusted   fall prevention program maintained   clutter free environment maintained   activity supervised  Taken 3/11/2025 1915 by Freddy Watkins RN  Safety Promotion/Fall Prevention:   safety round/check completed   room organization consistent   lighting adjusted   fall prevention program maintained   clutter  free environment maintained   activity supervised  Intervention: Prevent Skin Injury  Recent Flowsheet Documentation  Taken 3/12/2025 0438 by Freddy Watkins RN  Body Position: position changed independently  Taken 3/12/2025 0211 by Freddy Watkins RN  Body Position: position changed independently  Taken 3/12/2025 0000 by Freddy Watkins RN  Body Position: position changed independently  Taken 3/11/2025 2200 by Freddy Watkins RN  Body Position: position changed independently  Taken 3/11/2025 1915 by Freddy Watkins RN  Body Position: position changed independently  Intervention: Prevent Infection  Recent Flowsheet Documentation  Taken 3/12/2025 0438 by Freddy Watkins RN  Infection Prevention:   rest/sleep promoted   single patient room provided   hand hygiene promoted  Taken 3/12/2025 0211 by Freddy Watkins RN  Infection Prevention:   rest/sleep promoted   single patient room provided   hand hygiene promoted  Taken 3/12/2025 0000 by Freddy Watkins RN  Infection Prevention:   rest/sleep promoted   single patient room provided   hand hygiene promoted  Taken 3/11/2025 2200 by Freddy Watkins RN  Infection Prevention:   rest/sleep promoted   single patient room provided   hand hygiene promoted  Taken 3/11/2025 1915 by Freddy Watkins RN  Infection Prevention:   rest/sleep promoted   single patient room provided   hand hygiene promoted  Goal: Optimal Comfort and Wellbeing  Outcome: Progressing  Intervention: Provide Person-Centered Care  Recent Flowsheet Documentation  Taken 3/12/2025 0438 by Freddy Watkins RN  Trust Relationship/Rapport: emotional support provided  Taken 3/11/2025 1915 by Freddy Watkins RN  Trust Relationship/Rapport:   care explained   choices provided   emotional support provided   empathic listening provided   questions answered   questions encouraged  Goal: Readiness for Transition of Care  Outcome: Progressing  Goal: Plan of Care Review  Outcome: Progressing  Flowsheets (Taken 3/12/2025 0448)  Progress: improving  Plan of Care Reviewed  With: patient  Goal: Patient-Specific Goal (Individualized)  Outcome: Progressing  Goal: Absence of Hospital-Acquired Illness or Injury  Outcome: Progressing  Intervention: Identify and Manage Fall Risk  Recent Flowsheet Documentation  Taken 3/12/2025 0438 by Freddy Watkins RN  Safety Promotion/Fall Prevention:   safety round/check completed   room organization consistent   lighting adjusted   fall prevention program maintained   clutter free environment maintained   activity supervised  Taken 3/12/2025 0211 by Freddy Watkins RN  Safety Promotion/Fall Prevention:   safety round/check completed   room organization consistent   lighting adjusted   fall prevention program maintained   clutter free environment maintained   activity supervised  Taken 3/12/2025 0000 by Freddy Watkins RN  Safety Promotion/Fall Prevention:   safety round/check completed   room organization consistent   lighting adjusted   fall prevention program maintained   clutter free environment maintained   activity supervised  Taken 3/11/2025 2200 by Freddy Watkins RN  Safety Promotion/Fall Prevention:   safety round/check completed   room organization consistent   lighting adjusted   fall prevention program maintained   clutter free environment maintained   activity supervised  Taken 3/11/2025 1915 by Freddy Watkins RN  Safety Promotion/Fall Prevention:   safety round/check completed   room organization consistent   lighting adjusted   fall prevention program maintained   clutter free environment maintained   activity supervised  Intervention: Prevent Skin Injury  Recent Flowsheet Documentation  Taken 3/12/2025 0438 by Freddy Watkins RN  Body Position: position changed independently  Taken 3/12/2025 0211 by Freddy Watkins RN  Body Position: position changed independently  Taken 3/12/2025 0000 by Freddy Watkins RN  Body Position: position changed independently  Taken 3/11/2025 2200 by Freddy Watkins RN  Body Position: position changed independently  Taken 3/11/2025 1915 by  Freddy Watkins RN  Body Position: position changed independently  Intervention: Prevent Infection  Recent Flowsheet Documentation  Taken 3/12/2025 0438 by Freddy Watkins RN  Infection Prevention:   rest/sleep promoted   single patient room provided   hand hygiene promoted  Taken 3/12/2025 0211 by Freddy Watkins RN  Infection Prevention:   rest/sleep promoted   single patient room provided   hand hygiene promoted  Taken 3/12/2025 0000 by Freddy Watkins RN  Infection Prevention:   rest/sleep promoted   single patient room provided   hand hygiene promoted  Taken 3/11/2025 2200 by Freddy Watkins RN  Infection Prevention:   rest/sleep promoted   single patient room provided   hand hygiene promoted  Taken 3/11/2025 1915 by Freddy Watkins RN  Infection Prevention:   rest/sleep promoted   single patient room provided   hand hygiene promoted  Goal: Optimal Comfort and Wellbeing  Outcome: Progressing  Intervention: Provide Person-Centered Care  Recent Flowsheet Documentation  Taken 3/12/2025 0438 by Freddy Watkins RN  Trust Relationship/Rapport: emotional support provided  Taken 3/11/2025 1915 by Freddy Watkins RN  Trust Relationship/Rapport:   care explained   choices provided   emotional support provided   empathic listening provided   questions answered   questions encouraged  Goal: Readiness for Transition of Care  Outcome: Progressing

## 2025-03-12 NOTE — PLAN OF CARE
The pt was admitted to [facility] 2/2 to generalized weakness and a fall. Dx includes R hip pain due to his fall (x-ray negative for fracture). Pmhx significant for cerebellar infarct 9/24, COPD,  ESRD on dialysis, CAD, CVA & AFIB on AC. The pt reports living in a tri level home with steps to navigate to his bedroom/bath. The pt reports that his wife assists him with ADL's and he uses a walker for mobility. Today, the pt reported generalized weakness. He completed bed mobility with Min A. Max A required for LBD and Mod A for doffing/donning a hospital gown. He put his head in his hands and bent over due to weakness. He returned to supine with Min A for a short rest break prior to transferring to the EOB a second time and standing with Min A + walker. He took steps forward and sideways with Min A + walker. He declined sinkside grooming or seated bedside chair participation due to feeling fatigued. He returned to supine with Min A. SNF recommended.

## 2025-03-12 NOTE — CASE MANAGEMENT/SOCIAL WORK
Discharge Planning Assessment  [facility]     Patient Name: Carlito Mo  MRN: [id number]  Today's Date: 3/12/2025    Admit Date: 3/10/2025    Plan: SNF - referral to [facility] pending.   Discharge Needs Assessment       Row Name 03/12/25 1006       Living Environment    People in Home spouse;child(carlos), adult    Current Living Arrangements home    Primary Care Provided by spouse/significant other    Provides Primary Care For no one, unable/limited ability to care for self    Family Caregiver if Needed spouse    Quality of Family Relationships supportive       Resource/Environmental Concerns    Resource/Environmental Concerns none    Transportation Concerns none       Transportation Needs    In the past 12 months, has lack of transportation kept you from medical appointments or from getting medications? no    In the past 12 months, has lack of transportation kept you from meetings, work, or from getting things needed for daily living? No       Transition Planning    Patient/Family Anticipates Transition to inpatient rehabilitation facility    Patient/Family Anticipated Services at Transition rehabilitation services    Transportation Anticipated family or friend will provide;health plan transportation       Discharge Needs Assessment    Readmission Within the Last 30 Days no previous admission in last 30 days    Current Outpatient/Agency/Support Group homecare agency;outpatient hemodialysis    Equipment Currently Used at Home cane, straight;walker, rolling;cpap    Concerns to be Addressed discharge planning    Equipment Needed After Discharge none    Outpatient/Agency/Support Group Needs skilled nursing facility    Discharge Facility/Level of Care Needs nursing facility, skilled    Provided Post Acute Provider List? Yes    Post Acute Provider List Nursing Home    Provided Post Acute Provider Quality & Resource List? Yes    Post Acute Provider Quality and Resource List Nursing Home    Delivered To Patient     Method of Delivery In person    Offered/Gave Vendor List yes    Current Discharge Risk chronically ill;physical impairment                   Discharge Plan       Row Name 03/12/25 1018       Plan    Plan SNF - referral to [facility] pending.    Plan Comments S/W pt at bedside.  Facesheet info confirmed.  Pt lives in a trilevel house w/ his wife and 2 adult sons. He goes to HD on a MWF schedule at [facility] on [address] - his wife or transport company provides transport.  Pt does not drive.  Home DME includes a cane, CPAP, walker and BP cuff.  Pt thinks he is current w/ [facility] - Nikia/ [facility] notified and will check.  Pt has been to rehab at [place] and [facility].  Pt states he will likely need rehab upon DC.  CMS Compare info from Medicare.gov given and discussed.  Per his request, referral sent to [facility] - yaniv pending. Would require precert for SNF.  Per pt's approval, HIPAA compliant  msg left for his spouse Lissette ([phone]) - await callback. ..............Heike GONZALES/ GISELLE                  Continued Care and Services - Admitted Since 3/10/2025       Destination       Service Provider Request Status Services Address Phone Fax Patient Preferred    [facility] Pending - Request Sent -- [address] [address] [phone] --              Dialysis/Infusion       Service Provider Request Status Services Address Phone Fax Patient Preferred    [facility] Pending - No Request Sent -- [address] [id number] [phone] --              Home Medical Care       Service Provider Request Status Services Address Phone Fax Patient Preferred    [facility] Pending - Request Sent -- [address], [address] [address] [phone] --                  Expected Discharge Date and Time       Expected Discharge Date Expected Discharge  Time    Mar 12, 2025            Demographic Summary       Row Name 03/12/25 1005       General Information    Admission Type observation    Arrived From home    Required Notices Provided Observation Status Notice    Referral Source admission list    Reason for Consult discharge planning    Preferred Language English                   Functional Status       Row Name 03/12/25 1005       Functional Status    Usual Activity Tolerance fair       Functional Status, IADL    Medications assistive person    Meal Preparation assistive person    Housekeeping assistive person    Laundry assistive person    Shopping assistive person       Employment/    Employment Status retired                               Heike Sellers RN

## 2025-03-12 NOTE — PROGRESS NOTES
Name: Carlito Mo ADMIT: 3/10/2025   : 1955  PCP: Belle Simons APRN    MRN: [id number] LOS: 0 days   AGE/SEX: 69 y.o. male  ROOM: [facility]     Subjective   Subjective   Mr. Mo  is poor historian , and unable to provide any details of what led to his hospitalization. He continues to deny hip pain. He does verbalize understanding he will need to go to rehab at discharge, he currently offers no complaints. He is sitting up in bed eating lunch.    Objective   Objective   Vital Signs  Temp:  [97.3 °F (36.3 °C)-98.4 °F (36.9 °C)] 97.5 °F (36.4 °C)  Heart Rate:  [64-69] 68  Resp:  [14-17] 16  BP: (113-148)/(58-77) 145/76  SpO2:  [96 %-99 %] 99 %  on   ;   Device (Oxygen Therapy): room air  Body mass index is 19.78 kg/m².  Physical Exam  Vitals and nursing note reviewed.   Constitutional:       Appearance: He is ill-appearing.   HENT:      Head: Atraumatic.      Mouth/Throat:      Mouth: Mucous membranes are dry.   Eyes:      Conjunctiva/sclera: Conjunctivae normal.   Cardiovascular:      Rate and Rhythm: Normal rate. Rhythm irregular.      Pulses: Normal pulses.           Radial pulses are 2+ on the right side and 2+ on the left side.      Heart sounds: Normal heart sounds.   Pulmonary:      Effort: Pulmonary effort is normal.   Abdominal:      General: Bowel sounds are normal.      Palpations: Abdomen is soft.   Musculoskeletal:      Right lower leg: Edema present.   Skin:     General: Skin is warm and dry.      Capillary Refill: Capillary refill takes less than 2 seconds.      Coloration: Skin is pale.   Neurological:      General: No focal deficit present.      Mental Status: He is alert. Mental status is at baseline. He is confused.   Psychiatric:         Mood and Affect: Mood normal.       Results Review     I reviewed the patient's new clinical results.  Results from last 7 days   Lab Units 25  0518 25  0500 03/10/25  1854   WBC 10*3/mm3 6.18 6.55 5.80   HEMOGLOBIN g/dL 8.9* 9.3* 10.9*    PLATELETS 10*3/mm3 209 213 246     Results from last 7 days   Lab Units 03/12/25  0518 03/11/25  0500 03/10/25  1854 03/07/25  0000   SODIUM mmol/L 137 137 133*  --    POTASSIUM mmol/L 4.7 5.0 5.2  --    CHLORIDE mmol/L 104 100 96*  --    CO2 mmol/L 24.0 27.6 29.3*  --    BUN mg/dL 37* 48* 48* 46*   CREATININE mg/dL 2.82* 3.30* 3.78*  --    GLUCOSE mg/dL 96 183* 332*  --    EGFR mL/min/1.73 23.5* 19.4* 16.5*  --      Results from last 7 days   Lab Units 03/12/25  0518 03/10/25  1854   ALBUMIN g/dL 3.3* 3.5   BILIRUBIN mg/dL  --  0.4   ALK PHOS U/L  --  104   AST (SGOT) U/L  --  17   ALT (SGPT) U/L  --  16     Results from last 7 days   Lab Units 03/12/25  0518 03/11/25  0500 03/10/25  1854   CALCIUM mg/dL 8.5* 8.6 9.4   ALBUMIN g/dL 3.3*  --  3.5   PHOSPHORUS mg/dL 3.2  --   --        Hemoglobin A1C   Date/Time Value Ref Range Status   03/10/2025 2323 7.10 (H) 4.80 - 5.60 % Final     Glucose   Date/Time Value Ref Range Status   03/12/2025 1046 248 (H) 70 - 130 mg/dL Final   03/12/2025 0620 95 70 - 130 mg/dL Final   03/11/2025 2010 217 (H) 70 - 130 mg/dL Final   03/11/2025 1603 199 (H) 70 - 130 mg/dL Final   03/11/2025 0557 220 (H) 70 - 130 mg/dL Final   03/10/2025 2300 193 (H) 70 - 130 mg/dL Final       XR Chest 1 View  Result Date: 3/11/2025   No active disease in the chest.    This report was finalized on 3/11/2025 5:41 AM by Dr. Matthieu Ortega M.D on Workstation: [id number]        I have personally reviewed all medications:  Scheduled Medications  apixaban, 2.5 mg, Oral, BID  aspirin, 325 mg, Oral, Daily  atorvastatin, 80 mg, Oral, Nightly  insulin lispro, 2-7 Units, Subcutaneous, 4x Daily AC & at Bedtime  levothyroxine, 75 mcg, Oral, Q AM  pantoprazole, 40 mg, Oral, BID AC  sodium chloride, 10 mL, Intravenous, Q12H  tamsulosin, 0.4 mg, Oral, Daily    Infusions   Diet  Diet: Regular/House; Fluid Consistency: Thin (IDDSI 0)    I have personally reviewed:  [x]  Laboratory   [x]  Microbiology   [x]  Radiology    [x]  EKG/Telemetry  [x]  Cardiology/Vascular   []  Pathology    []  Records       Assessment/Plan     Active Hospital Problems    Diagnosis  POA   • **Generalized weakness [R53.1]  Yes   • Chronic HFrEF (heart failure with reduced ejection fraction) [I50.22]  Yes   • ESRD (end stage renal disease) [N18.6]  Yes   • History of stroke [Z86.73]  Not Applicable   • Paroxysmal atrial fibrillation [I48.0]  Yes   • Embolic stroke [I63.9]  Yes   • Essential hypertension [I10]  Yes   • Coronary artery disease involving native coronary artery of native heart without angina pectoris [I25.10]  Yes   • Acquired hypothyroidism [E03.9]  Yes   • Type 2 diabetes mellitus, with long-term current use of insulin [E11.9, Z79.4]  Not Applicable      Resolved Hospital Problems   No resolved problems to display.       69 y.o. male admitted with Generalized weakness.    Generalized weakness  Fall with right hip pain at home  Initial x-ray at time of admission was unremarkable for any acute fracture  He currently denies any hip pain  PT OT following recommend SNF/acute rehab at discharge - He is stable for discharge pending placement   CCP following and coordinating     ESRD  Hemodialysis dependent-last HD on 3/7   Normal sessions Monday, Wednesday, Friday  Nephrology following  HD this a.m.  Will continue to monitor appreciate nephrology's evaluation, assistance and recommendations.  HD per Nephrology tomorrow   Monitor electrolytes and replace as needed    Anemia of Chronic disease   HBG  8.9  Will continue to monitor and use for symptomatic anemia, hemoglobin less than 7.      Coronary artery disease  History of CVA-memory deficits noted  Elevated troponin levels  Patient is free of chest pain  Troponin level likely elevated secondary to CKD and missing his HD.  Continue ASA, statin    Proximal atrial fibrillation  No pharmacological agent for rate control currently  On chronic anticoagulation with Eliquis    HFrEF  Chronic  Most recent  echocardiogram LVEF 47.7%  proBNP is elevated, at his baseline and stable.  He appears euvolemic  Continue to monitor I's and O's.  Daily weights    Hypothyroidism  Continue home levothyroxine    BPH  Continue home tamsulosin    Type II DM   Continue Accu-Cheks ACHS  He continues to be hyperglycemic-intermittently  Not on  home insulin, or oral antidiabetics  Continue SSI for hyperglycemia  Continue glycemia treatment per protocol  A1c 7.10  Changed diet to consistent carb diet from regular            Eliquis (home med) for DVT prophylaxis.  Full code.  Discussed with patient, nursing staff, and care team on multidisciplinary rounds.  Anticipate discharge home later this week.  Expected Discharge Date: 3/12/2025; Expected Discharge Time:       JI Hurtado  [facility]  03/12/25  14:57 EDT

## 2025-03-12 NOTE — THERAPY EVALUATION
Patient Name: Carlito Mo  : 1955    MRN: [id number]                              Today's Date: 3/12/2025       Admit Date: 3/10/2025    Visit Dx:     ICD-10-CM ICD-9-CM   1. Generalized weakness  R53.1 780.79   2. ESRD on dialysis  N18.6 585.6    Z99.2 V45.11   3. Fall, initial encounter  W19.XXXA E888.9     Patient Active Problem List   Diagnosis    Major depressive disorder with single episode, in partial remission    Other hyperlipidemia    Type 2 diabetes mellitus, with long-term current use of insulin    Vitamin D deficiency    Erectile dysfunction    Chronic fatigue    Type 2 diabetes mellitus with ophthalmic complication    Benign prostatic hyperplasia with nocturia    History of basal cell carcinoma    Acquired hypothyroidism    Recurrent strokes    Suspected sleep apnea    YAW (obstructive sleep apnea)    Coronary artery disease involving native coronary artery of native heart without angina pectoris    Chronically elevated troponin    Colon cancer screening    Enlarged lymph nodes    Functional gait abnormality    History of myocardial infarction    Insomnia    Iron deficiency anemia    Major depression, recurrent    Essential hypertension    Acute on chronic renal insufficiency    Falls frequently    Acute on chronic anemia    Neurogenic orthostatic hypotension    Anemia, chronic disease    History of colon polyps    Helicobacter pylori gastritis    Esophagitis    Embolic stroke    Patent foramen ovale    Cardiomyopathy    Diarrhea    Generalized weakness    Paroxysmal atrial fibrillation    Chronic anticoagulation    Acute ischemic stroke    History of stroke    Iron deficiency anemia    Acute HFrEF (heart failure with reduced ejection fraction)    ESRD (end stage renal disease)    Hemoptysis    Chronic HFrEF (heart failure with reduced ejection fraction)    Hemodialysis patient    Exertional dyspnea    Severe malnutrition    Ataxia    Cerebellar stroke, acute    Medically noncompliant     Vertebral artery stenosis, bilateral    Carotid stenosis, right     Past Medical History:   Diagnosis Date    Acquired hypothyroidism     Acute sinusitis     SYLVAIN (acute kidney injury)     on CKD    Anemia     Arthritis     Atrial fibrillation     CAD (coronary artery disease)     Chronic combined systolic and diastolic congestive heart failure     COPD (chronic obstructive pulmonary disease)     Depression     Diabetic neuropathy 04/11/2022    Diabetes mellitus due to underlying condition    Esophagitis 06/10/2020    Added automatically from request for surgery [id number]      ESRD (end stage renal disease) 12/01/2023    Frequent PVCs     Generalized weakness     GERD (gastroesophageal reflux disease)     Helicobacter pylori gastritis 06/04/2020    Hyperlipidemia     Hypertension     Hypertensive encephalopathy     Pleural effusion     Pneumonia     Poor historian     RBBB (right bundle branch block)     Stroke     POOR VISION    TIA (transient ischemic attack)     Type 2 diabetes mellitus     Urinary retention     Vitamin D deficiency      Past Surgical History:   Procedure Laterality Date    APPENDECTOMY N/A 02/14/2016    Dr. Alexey Dodson    ARTERIOVENOUS FISTULA/SHUNT SURGERY Right 06/03/2022    Procedure: RIGHT FOREARM ARTERIOVENOUS FISTULA PLACEMENT RADIOCEPHALIC WITH CEPHALIC VEIN TRANSPOSITION;  Surgeon: Eliseo Willis MD;  Location: [facility] MAIN OR;  Service: Vascular;  Laterality: Right;    COLONOSCOPY      over 20 years ago.  no polyps     COLONOSCOPY N/A 09/18/2018    Procedure: COLONOSCOPY;  Surgeon: Jose Enrique Louie MD;  Location: [facility] ENDOSCOPY;  Service: Gastroenterology    COLONOSCOPY N/A 09/19/2018    IH, EH, polyps (TAs w/low grade dysplasia)    COLONOSCOPY N/A 06/01/2020    Procedure: COLONOSCOPY;  Surgeon: Jose Enrique Louie MD;  Location: [facility] ENDOSCOPY;  Service: Gastroenterology;  Laterality: N/A;  Pre op-Anemia, Melena, History of Polyps  Post op-To Cecum/TI, Polyp, Poor Prep     CORONARY ARTERY BYPASS GRAFT  11/2001    ENDOSCOPY N/A 05/29/2020    Procedure: ESOPHAGOGASTRODUODENOSCOPY with biopsies;  Surgeon: Amanda Lovelace MD;  Location:  [facility];  Service: Gastroenterology;  Laterality: N/A;  pre-anemia, dark stools  post-esophagitis, hiatal hernia    ENDOSCOPY N/A 09/15/2020    Procedure: ESOPHAGOGASTRODUODENOSCOPY WITH BIOPSIES;  Surgeon: Jose Enrique Louie MD;  Location:  [facility];  Service: Gastroenterology;  Laterality: N/A;  pre: history of melena and esophagitis  post: mild esophagitis and gastritis, small hiatal hernia    ENDOSCOPY N/A 12/4/2023    Procedure: ESOPHAGOGASTRODUODENOSCOPY with bx;  Surgeon: Quoc Elmore MD;  Location:  [facility];  Service: Gastroenterology;  Laterality: N/A;  pre: Coffee-ground emesis  post: hiatal hernia, esophagitis    HERNIA REPAIR Left 12/05/2019    THORACENTESIS      TONSILLECTOMY      VASECTOMY        General Information       Row Name 03/12/25 1342          OT Time and Intention    Subjective Information complains of;weakness;fatigue  -RB     Document Type evaluation  -RB     Mode of Treatment individual therapy;occupational therapy  -RB     Patient Effort good  -RB       Row Name 03/12/25 1342          General Information    Patient Profile Reviewed yes  -RB     Prior Level of Function min assist:;ADL's;transfer  the pt reports that assistance is provided by his wife for ADL's and he uses a walker for mobility.  -RB     Existing Precautions/Restrictions fall  -RB     Barriers to Rehab medically complex;previous functional deficit  -RB       Row Name 03/12/25 1342          Living Environment    Current Living Arrangements home  -RB     People in Home child(carlos), adult;spouse  -RB       Row Name 03/12/25 1342          Home Main Entrance    Number of Stairs, Main Entrance three  -RB       Row Name 03/12/25 1342          Stairs Within Home, Primary    Stairs, Within Home, Primary tri level home, ~6 steps to his  upstairs and main living level.  -RB       Row Name 03/12/25 1342          Cognition    Orientation Status (Cognition) oriented x 3  -RB       Row Name 03/12/25 1342          Safety Issues/Impairments Affecting Functional Mobility    Safety Issues Affecting Function (Mobility) safety precaution awareness;safety precautions follow-through/compliance;positioning of assistive device  -RB     Impairments Affecting Function (Mobility) balance;endurance/activity tolerance;strength;postural/trunk control;coordination  -RB               User Key  (r) = Recorded By, (t) = Taken By, (c) = Cosigned By      Initials Name Provider Type    RB Katie Starr OT Occupational Therapist                     Mobility/ADL's       Row Name 03/12/25 1344          Bed Mobility    Bed Mobility supine-sit;sit-supine  -RB     Supine-Sit [place] (Bed Mobility) minimum assist (75% patient effort);1 person assist;verbal cues  -RB     Sit-Supine [place] (Bed Mobility) minimum assist (75% patient effort);1 person assist;verbal cues  -RB     Assistive Device (Bed Mobility) bed rails  -RB     Comment, (Bed Mobility) The pt completed bed mobility twice - he required a supine rest break prior to sitting a second time in prep for standing  -RB       Row Name 03/12/25 1344          Transfers    Transfers sit-stand transfer;stand-sit transfer  -RB     Comment, (Transfers) the pt completed side steps and forward/backward steps using a walker to simulate functional transfers.  -RB       Row Name 03/12/25 1344          Sit-Stand Transfer    Sit-Stand [place] (Transfers) verbal cues;minimum assist (75% patient effort)  -RB     Assistive Device (Sit-Stand Transfers) walker, front-wheeled  -RB       Row Name 03/12/25 1344          Stand-Sit Transfer    Stand-Sit [place] (Transfers) minimum assist (75% patient effort);verbal cues  -RB     Assistive Device (Stand-Sit Transfers) walker, front-wheeled  -RB       Row Name 03/12/25 134           Functional Mobility    Functional Mobility- Ind. Level unable to perform  -RB     Functional Mobility- Comment he was only able to tolerate ~45 seconds of standing activity + walker  -RB       Row Name 03/12/25 1347          Activities of Daily Living    BADL Assessment/Intervention lower body dressing;upper body dressing  -RB       Row Name 03/12/25 1347          Lower Body Dressing Assessment/Training    [place] Level (Lower Body Dressing) lower body dressing skills;maximum assist (25% patient effort)  -RB     Position (Lower Body Dressing) edge of bed sitting  -RB       Row Name 03/12/25 1347          Upper Body Dressing Assessment/Training    [place] Level (Upper Body Dressing) upper body dressing skills;moderate assist (50% patient effort);verbal cues;front opening garment;doff;don  -RB               User Key  (r) = Recorded By, (t) = Taken By, (c) = Cosigned By      Initials Name Provider Type    Katie Perez OT Occupational Therapist                   Obj/Interventions       Row Name 03/12/25 1348          Sensory Assessment (Somatosensory)    Sensory Assessment (Somatosensory) sensation intact  -RB       Row Name 03/12/25 1348          Vision Assessment/Intervention    Visual Impairment/Limitations WFL  -RB       Row Name 03/12/25 1348          Range of Motion Comprehensive    General Range of Motion no range of motion deficits identified  -       Row Name 03/12/25 1348          Strength Comprehensive (MMT)    Comment, General Manual Muscle Testing (MMT) Assessment Generalized weakness in his BLE's  -       Row Name 03/12/25 1348          Balance    Comment, Balance SBA sitting balance, Min A + walker for standing balance, general unsteadiness noticed  -RB               User Key  (r) = Recorded By, (t) = Taken By, (c) = Cosigned By      Initials Name Provider Type    Katie Perez OT Occupational Therapist                   Goals/Plan       Row Name 03/12/25 134           Bed Mobility Goal 1 (OT)    Activity/Assistive Device (Bed Mobility Goal 1, OT) bed mobility activities, all  -RB     [place] Level/Cues Needed (Bed Mobility Goal 1, OT) standby assist  -RB     Time Frame (Bed Mobility Goal 1, OT) short term goal (STG);2 weeks  -RB     Progress/Outcomes (Bed Mobility Goal 1, OT) new goal  -RB       Row Name 03/12/25 1349          Transfer Goal 1 (OT)    Activity/Assistive Device (Transfer Goal 1, OT) transfers, all  -RB     [place] Level/Cues Needed (Transfer Goal 1, OT) standby assist  -RB     Time Frame (Transfer Goal 1, OT) short term goal (STG);2 weeks  -RB     Progress/Outcome (Transfer Goal 1, OT) new goal  -RB       Row Name 03/12/25 1349          Bathing Goal 1 (OT)    Activity/Device (Bathing Goal 1, OT) bathing skills, all  -RB     [place] Level/Cues Needed (Bathing Goal 1, OT) minimum assist (75% or more patient effort)  -RB     Time Frame (Bathing Goal 1, OT) short term goal (STG);2 weeks  -RB     Progress/Outcomes (Bathing Goal 1, OT) new goal  -RB       Row Name 03/12/25 1349          Dressing Goal 1 (OT)    Activity/Device (Dressing Goal 1, OT) dressing skills, all  -RB     [place]/Cues Needed (Dressing Goal 1, OT) minimum assist (75% or more patient effort)  -RB     Time Frame (Dressing Goal 1, OT) short term goal (STG);2 weeks  -RB     Progress/Outcome (Dressing Goal 1, OT) new goal  -RB       Row Name 03/12/25 1349          Toileting Goal 1 (OT)    Activity/Device (Toileting Goal 1, OT) toileting skills, all  -RB     [place] Level/Cues Needed (Toileting Goal 1, OT) minimum assist (75% or more patient effort)  -RB     Time Frame (Toileting Goal 1, OT) short term goal (STG);2 weeks  -RB     Progress/Outcome (Toileting Goal 1, OT) new goal  -RB       Row Name 03/12/25 1349          Grooming Goal 1 (OT)    Activity/Device (Grooming Goal 1, OT) grooming skills, all  -RB     [place] (Grooming Goal 1, OT) standby assist  -RB     Time Frame  (Grooming Goal 1, OT) short term goal (STG);2 weeks  -RB     Progress/Outcome (Grooming Goal 1, OT) new goal  -RB       Row Name 03/12/25 1349          Therapy Assessment/Plan (OT)    Planned Therapy Interventions (OT) transfer/mobility retraining;strengthening exercise;ROM/therapeutic exercise;activity tolerance training;adaptive equipment training;BADL retraining;functional balance retraining;occupation/activity based interventions;patient/caregiver education/training  -RB               User Key  (r) = Recorded By, (t) = Taken By, (c) = Cosigned By      Initials Name Provider Type    RB Katie Starr OT Occupational Therapist                   Clinical Impression       Row Name 03/12/25 1349          Pain Assessment    Pretreatment Pain Rating 0/10 - no pain  -RB     Posttreatment Pain Rating 0/10 - no pain  -RB       Row Name 03/12/25 1344          Plan of Care Review    Plan of Care Reviewed With patient  -RB     Progress no change  -RB     Outcome Evaluation The pt was admitted to [facility] 2/2 to generalized weakness and a fall. Dx includes R hip pain due to his fall (x-ray negative for fracture). Pmhx significant for cerebellar infarct 9/24, COPD,  ESRD on dialysis, CAD, CVA & AFIB on AC. The pt reports living in a tri level home with steps to navigate to his bedroom/bath. The pt reports that his wife assists him with ADL's and he uses a walker for mobility. Today, the pt reported generalized weakness. He completed bed mobility with Min A. Max A required for LBD and Mod A for doffing/donning a hospital gown. He put his head in his hands and bent over due to weakness. He returned to supine with Min A for a short rest break prior to transferring to the EOB a second time and standing with Min A + walker. He took steps forward and sideways with Min A + walker. He declined sinkside grooming or seated bedside chair participation due to feeling fatigued. He returned to supine with Min A. SNF recommended.  -RB        Row Name 03/12/25 1349          Therapy Assessment/Plan (OT)    Rehab Potential (OT) good  -RB     Criteria for Skilled Therapeutic Interventions Met (OT) yes;skilled treatment is necessary  -RB     Therapy Frequency (OT) 5 times/wk  -RB       Row Name 03/12/25 1349          Therapy Plan Review/Discharge Plan (OT)    Anticipated Discharge Disposition (OT) skilled nursing facility  -RB       Row Name 03/12/25 1349          Vital Signs    Pre Patient Position Supine  -RB     Intra Patient Position Standing  -RB     Post Patient Position Supine  -RB       Row Name 03/12/25 1349          Positioning and Restraints    Pre-Treatment Position in bed  -RB     Post Treatment Position bed  -RB     In Bed notified nsg;supine;encouraged to call for assist;fowlers;exit alarm on;call light within reach  -RB               User Key  (r) = Recorded By, (t) = Taken By, (c) = Cosigned By      Initials Name Provider Type    RB Katie Starr, OT Occupational Therapist                   Outcome Measures       Row Name 03/12/25 1350          How much help from another is currently needed...    Putting on and taking off regular lower body clothing? 2  -RB     Bathing (including washing, rinsing, and drying) 2  -RB     Toileting (which includes using toilet bed pan or urinal) 1  -RB     Putting on and taking off regular upper body clothing 2  -RB     Taking care of personal grooming (such as brushing teeth) 3  -RB     Eating meals 3  -RB     AM-PAC 6 Clicks Score (OT) 13  -RB       Row Name 03/12/25 1254          How much help from another person do you currently need...    Turning from your back to your side while in flat bed without using bedrails? 3  -RT     Moving from lying on back to sitting on the side of a flat bed without bedrails? 3  -RT     Moving to and from a bed to a chair (including a wheelchair)? 3  -RT     Standing up from a chair using your arms (e.g., wheelchair, bedside chair)? 2  -RT     Climbing 3-5 steps with a  railing? 2  -RT     To walk in hospital room? 2  -RT     AM-PAC 6 Clicks Score (PT) 15  -RT       Row Name 03/12/25 1350          Modified Keren Scale    Modified Keren Scale 4 - Moderately severe disability.  Unable to walk without assistance, and unable to attend to own bodily needs without assistance.  -RB       Row Name 03/12/25 1350          Functional Assessment    Outcome Measure Options AM-PAC 6 Clicks Daily Activity (OT);Modified Keren  -RB               User Key  (r) = Recorded By, (t) = Taken By, (c) = Cosigned By      Initials Name Provider Type    RB Katie Starr, DANGELO Occupational Therapist    RT Jaci Grayson RN Registered Nurse                      OT Recommendation and Plan  Planned Therapy Interventions (OT): transfer/mobility retraining, strengthening exercise, ROM/therapeutic exercise, activity tolerance training, adaptive equipment training, BADL retraining, functional balance retraining, occupation/activity based interventions, patient/caregiver education/training  Therapy Frequency (OT): 5 times/wk  Plan of Care Review  Plan of Care Reviewed With: patient  Progress: no change  Outcome Evaluation: The pt was admitted to [facility] 2/2 to generalized weakness and a fall. Dx includes R hip pain due to his fall (x-ray negative for fracture). Pmhx significant for cerebellar infarct 9/24, COPD,  ESRD on dialysis, CAD, CVA & AFIB on AC. The pt reports living in a tri level home with steps to navigate to his bedroom/bath. The pt reports that his wife assists him with ADL's and he uses a walker for mobility. Today, the pt reported generalized weakness. He completed bed mobility with Min A. Max A required for LBD and Mod A for doffing/donning a hospital gown. He put his head in his hands and bent over due to weakness. He returned to supine with Min A for a short rest break prior to transferring to the EOB a second time and standing with Min A + walker. He took steps forward and sideways with Min A +  walker. He declined sinkside grooming or seated bedside chair participation due to feeling fatigued. He returned to supine with Min A. SNF recommended.     Time Calculation:   Evaluation Complexity (OT)  Review Occupational Profile/Medical/Therapy History Complexity: expanded/moderate complexity  Assessment, Occupational Performance/Identification of Deficit Complexity: 3-5 performance deficits  Clinical Decision Making Complexity (OT): detailed assessment/moderate complexity  Overall Complexity of Evaluation (OT): moderate complexity     Time Calculation- OT       Row Name 03/12/25 1341             Time Calculation- OT    OT Start Time 1039  -RB      OT Stop Time 1110  -RB      OT Time Calculation (min) 31 min  -RB      Total Timed Code Minutes- OT 24 minute(s)  -RB      OT Received On 03/12/25  -RB      OT - Next Appointment 03/13/25  -RB      OT Goal Re-Cert Due Date 03/26/25  -RB         Timed Charges    13762 - OT Therapeutic Activity Minutes 10  -RB      12800 - OT Self Care/Mgmt Minutes 14  -RB         Untimed Charges    OT Eval/Re-eval Minutes 7  -RB         Total Minutes    Timed Charges Total Minutes 24  -RB      Untimed Charges Total Minutes 7  -RB       Total Minutes 31  -RB                User Key  (r) = Recorded By, (t) = Taken By, (c) = Cosigned By      Initials Name Provider Type    RB Katie Starr OT Occupational Therapist                  Therapy Charges for Today       Code Description Service Date Service Provider Modifiers Qty    16038068142 HC OT THERAPEUTIC ACT EA 15 MIN 3/12/2025 Katie Starr OT GO 1    60243196357 HC OT SELF CARE/MGMT/TRAIN EA 15 MIN 3/12/2025 Katie Starr OT GO 1    74260479734 HC OT EVAL MOD COMPLEXITY 3 3/12/2025 Katie Starr OT GO 1                 Katie Starr OT  3/12/2025

## 2025-03-12 NOTE — DISCHARGE PLACEMENT REQUEST
"Carlito Mo (69 y.o. Male)       Date of Birth   1955    Social Security Number       Address   [address] [place] [address]    Home Phone   [phone]    MRN   [id number]       [facility]    Marital Status                               Admission Date   3/10/2025    Admission Type   Emergency    Admitting Provider   Amanda Martins MD    Attending Provider   Aria Red MD    Department, Room/Bed   [address], S515/1       Discharge Date       Discharge Disposition       Discharge Destination                                 Attending Provider: Aria Red MD    Allergies: Prozac [Fluoxetine Hcl]    Isolation: None   Infection: None   Code Status: CPR    Ht: 182.9 cm (72.01\")   Wt: 66.2 kg (145 lb 14.4 oz)    Admission Cmt: None   Principal Problem: Generalized weakness [R53.1]                   Active Insurance as of 3/10/2025       Primary Coverage       Payor Plan Insurance Group Employer/Plan Group    HUMANA MEDICARE REPLACEMENT HUMANA MEDICARE ADVANTAGE HMO [id number]       Payor Plan Address Payor Plan Phone Number Payor Plan Fax Number Effective Dates    [address] [address] [phone]  1/1/2025 - None Entered    [facility] [id number]         Subscriber Name Subscriber Birth Date Member ID       CARLITO MO 1955 [id number]                     Emergency Contacts        (Rel.) Home Phone Work Phone Mobile Phone    Lissette Mo (Spouse) [phone] -- [phone]                "

## 2025-03-12 NOTE — PROGRESS NOTES
[facility] Progress Note      Patient Name: Carlito Mo  : 1955  MRN: [id number]  Primary Care Physician:  Belle Simons APRN  Date of admission: 3/10/2025    Subjective     Interval History:   F/u ESRD     Review of Systems:   Dialyzed yesterday , remove 1.5L  He declined PT yesterday post dialysis   Denies dyspnea     Objective     Vitals:   Temp:  [96.8 °F (36 °C)-98.4 °F (36.9 °C)] 98.4 °F (36.9 °C)  Heart Rate:  [64-69] 68  Resp:  [16-17] 16  BP: (113-148)/(58-77) 126/69    Intake/Output Summary (Last 24 hours) at 3/12/2025 0705  Last data filed at 3/12/2025 0519  Gross per 24 hour   Intake 1160 ml   Output 1.5 ml   Net 1158.5 ml       Physical Exam:    General Appearance: frail WM no distress  Neck: supple, no JVD  Lungs: CTA bilat no rales   Heart: RRR, normal S1 and S2  Abdomen: soft, nontender, nondistended  Extremities: no edema, cyanosis or clubbing      Scheduled Meds:     apixaban, 2.5 mg, Oral, BID  aspirin, 325 mg, Oral, Daily  atorvastatin, 80 mg, Oral, Nightly  insulin lispro, 2-7 Units, Subcutaneous, 4x Daily AC & at Bedtime  levothyroxine, 75 mcg, Oral, Q AM  pantoprazole, 40 mg, Oral, BID AC  sodium chloride, 10 mL, Intravenous, Q12H  tamsulosin, 0.4 mg, Oral, Daily      IV Meds:        Results Reviewed:   I have personally reviewed the results from the time of this admission to 3/12/2025 07:05 EDT     Results from last 7 days   Lab Units 25  0518 25  0500 03/10/25  1854   SODIUM mmol/L 137 137 133*   POTASSIUM mmol/L 4.7 5.0 5.2   CHLORIDE mmol/L 104 100 96*   CO2 mmol/L 24.0 27.6 29.3*   BUN mg/dL 37* 48* 48*   CREATININE mg/dL 2.82* 3.30* 3.78*   CALCIUM mg/dL 8.5* 8.6 9.4   BILIRUBIN mg/dL  --   --  0.4   ALK PHOS U/L  --   --  104   ALT (SGPT) U/L  --   --  16   AST (SGOT) U/L  --   --  17   GLUCOSE mg/dL 96 183* 332*     Estimated Creatinine Clearance: 23.1 mL/min (A) (by C-G formula based on SCr of 2.82 mg/dL (H)).  Results from last 7  days   Lab Units 03/12/25  0518   PHOSPHORUS mg/dL 3.2         Results from last 7 days   Lab Units 03/12/25  0518 03/11/25  0500 03/10/25  1854   WBC 10*3/mm3 6.18 6.55 5.80   HEMOGLOBIN g/dL 8.9* 9.3* 10.9*   PLATELETS 10*3/mm3 209 213 246           Assessment / Plan     ASSESSMENT:  ESRD - HD MWF via [facility].  Off cycle, dialyzed FRI - TUES (yesterday).  Lytes/vol stable, removed 1.5L  HTN of ESRD - initial BP's high better now, off antihypertensives (coreg stopped ~ 6 mos ago)  S/P fall with generalized weakness - he declined PT/OT eval yesterday post HD   DM2, mgmt per primary team  Hx AFIB & CVA on AC (eliquis)   BPH, on flomax   Anemia of CKD, hgb 8.9, likely on long acting HAI at dialysis    PLAN:  HD tomorrow   PT/OT eval - may need rehab     Jose Enrique Montelongo MD  03/12/25  07:05 EDT    [facility]  [phone]

## 2025-03-12 NOTE — THERAPY EVALUATION
Acute Care - Physical Therapy Initial Evaluation  [facility]     Patient Name: Carlito Mo  : 1955  MRN: [id number]  Today's Date: 3/12/2025      Visit Dx:     ICD-10-CM ICD-9-CM   1. Generalized weakness  R53.1 780.79   2. ESRD on dialysis  N18.6 585.6    Z99.2 V45.11   3. Fall, initial encounter  W19.XXXA E888.9     Patient Active Problem List   Diagnosis    Major depressive disorder with single episode, in partial remission    Other hyperlipidemia    Type 2 diabetes mellitus, with long-term current use of insulin    Vitamin D deficiency    Erectile dysfunction    Chronic fatigue    Type 2 diabetes mellitus with ophthalmic complication    Benign prostatic hyperplasia with nocturia    History of basal cell carcinoma    Acquired hypothyroidism    Recurrent strokes    Suspected sleep apnea    YAW (obstructive sleep apnea)    Coronary artery disease involving native coronary artery of native heart without angina pectoris    Chronically elevated troponin    Colon cancer screening    Enlarged lymph nodes    Functional gait abnormality    History of myocardial infarction    Insomnia    Iron deficiency anemia    Major depression, recurrent    Essential hypertension    Acute on chronic renal insufficiency    Falls frequently    Acute on chronic anemia    Neurogenic orthostatic hypotension    Anemia, chronic disease    History of colon polyps    Helicobacter pylori gastritis    Esophagitis    Embolic stroke    Patent foramen ovale    Cardiomyopathy    Diarrhea    Generalized weakness    Paroxysmal atrial fibrillation    Chronic anticoagulation    Acute ischemic stroke    History of stroke    Iron deficiency anemia    Acute HFrEF (heart failure with reduced ejection fraction)    ESRD (end stage renal disease)    Hemoptysis    Chronic HFrEF (heart failure with reduced ejection fraction)    Hemodialysis patient    Exertional dyspnea    Severe malnutrition    Ataxia    Cerebellar stroke, acute    Medically  noncompliant    Vertebral artery stenosis, bilateral    Carotid stenosis, right     Past Medical History:   Diagnosis Date    Acquired hypothyroidism     Acute sinusitis     SYLVAIN (acute kidney injury)     on CKD    Anemia     Arthritis     Atrial fibrillation     CAD (coronary artery disease)     Chronic combined systolic and diastolic congestive heart failure     COPD (chronic obstructive pulmonary disease)     Depression     Diabetic neuropathy 04/11/2022    Diabetes mellitus due to underlying condition    Esophagitis 06/10/2020    Added automatically from request for surgery [id number]      ESRD (end stage renal disease) 12/01/2023    Frequent PVCs     Generalized weakness     GERD (gastroesophageal reflux disease)     Helicobacter pylori gastritis 06/04/2020    Hyperlipidemia     Hypertension     Hypertensive encephalopathy     Pleural effusion     Pneumonia     Poor historian     RBBB (right bundle branch block)     Stroke     POOR VISION    TIA (transient ischemic attack)     Type 2 diabetes mellitus     Urinary retention     Vitamin D deficiency      Past Surgical History:   Procedure Laterality Date    APPENDECTOMY N/A 02/14/2016    Dr. Alexey Dodson    ARTERIOVENOUS FISTULA/SHUNT SURGERY Right 06/03/2022    Procedure: RIGHT FOREARM ARTERIOVENOUS FISTULA PLACEMENT RADIOCEPHALIC WITH CEPHALIC VEIN TRANSPOSITION;  Surgeon: Eliseo Willis MD;  Location: [facility] MAIN OR;  Service: Vascular;  Laterality: Right;    COLONOSCOPY      over 20 years ago.  no polyps     COLONOSCOPY N/A 09/18/2018    Procedure: COLONOSCOPY;  Surgeon: Jose Enrique Louie MD;  Location: [facility] ENDOSCOPY;  Service: Gastroenterology    COLONOSCOPY N/A 09/19/2018    IH, EH, polyps (TAs w/low grade dysplasia)    COLONOSCOPY N/A 06/01/2020    Procedure: COLONOSCOPY;  Surgeon: Jose Enrique Louie MD;  Location: [facility] ENDOSCOPY;  Service: Gastroenterology;  Laterality: N/A;  Pre op-Anemia, Melena, History of Polyps  Post op-To Cecum/TI,  Polyp, Poor Prep    CORONARY ARTERY BYPASS GRAFT  11/2001    ENDOSCOPY N/A 05/29/2020    Procedure: ESOPHAGOGASTRODUODENOSCOPY with biopsies;  Surgeon: Amanda Lovelace MD;  Location:  [facility];  Service: Gastroenterology;  Laterality: N/A;  pre-anemia, dark stools  post-esophagitis, hiatal hernia    ENDOSCOPY N/A 09/15/2020    Procedure: ESOPHAGOGASTRODUODENOSCOPY WITH BIOPSIES;  Surgeon: Jose Enrique Louie MD;  Location: [facility] ENDOSCOPY;  Service: Gastroenterology;  Laterality: N/A;  pre: history of melena and esophagitis  post: mild esophagitis and gastritis, small hiatal hernia    ENDOSCOPY N/A 12/4/2023    Procedure: ESOPHAGOGASTRODUODENOSCOPY with bx;  Surgeon: Quoc Elmore MD;  Location: [facility] ENDOSCOPY;  Service: Gastroenterology;  Laterality: N/A;  pre: Coffee-ground emesis  post: hiatal hernia, esophagitis    HERNIA REPAIR Left 12/05/2019    THORACENTESIS      TONSILLECTOMY      VASECTOMY       PT Assessment (Last 12 Hours)       PT Evaluation and Treatment       Row Name 03/12/25 [facility]2          Physical Therapy Time and Intention    Subjective Information complains of;weakness  -     Document Type evaluation  -     Mode of Treatment individual therapy;physical therapy  -     Patient Effort good  -       Row Name 03/12/25 6899          General Information    Patient Profile Reviewed yes  -     Patient/Family/Caregiver Comments/Observations pt supine in bed no acute distress  -     Equipment Currently Used at Home walker, rolling;cane, straight  -     Existing Precautions/Restrictions fall  -     Barriers to Rehab medically complex  -       Row Name 03/12/25 1395          Pain    Pretreatment Pain Rating 0/10 - no pain  -     Posttreatment Pain Rating 0/10 - no pain  -       Row Name 03/12/25 1359          Cognition    Orientation Status (Cognition) oriented x 3  -       Row Name 03/12/25 1250          Range of Motion Comprehensive    General Range of Motion  bilateral lower extremity ROM WFL  -       Row Name 03/12/25 [facility]3          Strength Comprehensive (MMT)    Comment, General Manual Muscle Testing (MMT) Assessment LEs at least 3/5, generalized weakness  -[facility] Name 03/12/25 [facility]3          Bed Mobility    Supine-Sit Chaves (Bed Mobility) contact guard  -     Sit-Supine Chaves (Bed Mobility) not tested  -     Assistive Device (Bed Mobility) bed rails;head of bed elevated  -     Comment, (Bed Mobility) increased time required to complete task  -       Row Name 03/12/25 [facility]3          Sit-Stand Transfer    Sit-Stand Chaves (Transfers) minimum assist (75% patient effort)  -     Assistive Device (Sit-Stand Transfers) walker, front-wheeled  -       Row Name 03/12/25 [facility]3          Stand-Sit Transfer    Stand-Sit Chaves (Transfers) minimum assist (75% patient effort)  -     Assistive Device (Stand-Sit Transfers) walker, front-wheeled  -       Row Name 03/12/25 [facility]3          Gait/Stairs (Locomotion)    Chaves Level (Gait) contact guard;minimum assist (75% patient effort)  -     Assistive Device (Gait) walker, front-wheeled  -     Distance in Feet (Gait) 15  -     Pattern (Gait) step-to;step-through  -     Deviations/Abnormal Patterns (Gait) bilateral deviations;eli decreased  -     Bilateral Gait Deviations forward flexed posture;heel strike decreased  -     Comment, (Gait/Stairs) ambulated around bed to chair  -       Row Name 03/12/25 [facility]3          Balance    Balance Assessment sitting static balance;standing static balance  -     Static Sitting Balance independent  -     Position, Sitting Balance supported;sitting edge of bed  -     Static Standing Balance contact guard;minimal assist  -     Position/Device Used, Standing Balance supported;walker, front-wheeled  -       Row Name 03/12/25 [facility]3          Motor Skills    Therapeutic Exercise --  Aps, LAQs x 5  -[facility] Name 03/12/25 [facility]3           Plan of Care Review    Plan of Care Reviewed With patient  -     Outcome Evaluation Pt 70 yo male presented to ED w weakness, fall, PMH end-stage renal disease on dialysis, CAD, type 2 diabetes. Pt lives in trilevel home and uses cane for ambulation. On PT exam pt ambulated 15ft MIn A rwx fwd posturing, gross generalized weakness. Pt may benefit from skilled PT acutely to address functional deficits, anticipate SNU at [place] as this doesnt appear to be patients baseline.  -       Row Name 03/12/25 9304          Positioning and Restraints    Pre-Treatment Position in bed  -     Post Treatment Position chair  -     In Chair reclined;notified nsg;call light within reach;encouraged to call for assist;exit alarm on  Rn and aide notified  -       Row Name 03/12/25 0727          Therapy Assessment/Plan (PT)    Rehab Potential (PT) fair  -     Criteria for Skilled Interventions Met (PT) yes  -     Therapy Frequency (PT) 5 times/wk  -       Row Name 03/12/25 4136          PT Evaluation Complexity    History, PT Evaluation Complexity 1-2 personal factors and/or comorbidities  -     Examination of Body Systems (PT Eval Complexity) total of 3 or more elements  -     Clinical Presentation (PT Evaluation Complexity) evolving  -     Clinical Decision Making (PT Evaluation Complexity) moderate complexity  -     Overall Complexity (PT Evaluation Complexity) moderate complexity  -       Row Name 03/12/25 3751          Physical Therapy Goals    Bed Mobility Goal Selection (PT) bed mobility, PT goal 1  -     Transfer Goal Selection (PT) transfer, PT goal 1  -     Gait Training Goal Selection (PT) gait training, PT goal 1  -       Row Name 03/12/25 0845          Bed Mobility Goal 1 (PT)    Activity/Assistive Device (Bed Mobility Goal 1, PT) bed mobility activities, all  -     [place] Level/Cues Needed (Bed Mobility Goal 1, PT) standby assist  -     Time Frame (Bed Mobility Goal 1, PT) 2 weeks   -       Row Name 03/12/25 1353          Transfer Goal 1 (PT)    Activity/Assistive Device (Transfer Goal 1, PT) sit-to-stand/stand-to-sit;bed-to-chair/chair-to-bed;walker, rolling  -     [place] Level/Cues Needed (Transfer Goal 1, PT) standby assist  -LH     Time Frame (Transfer Goal 1, PT) 2 weeks  -       Row Name 03/12/25 1353          Gait Training Goal 1 (PT)    Activity/Assistive Device (Gait Training Goal 1, PT) assistive device use;walker, rolling  -     [place] Level (Gait Training Goal 1, PT) standby assist  -     Distance (Gait Training Goal 1, PT) 100  -     Time Frame (Gait Training Goal 1, PT) 2 weeks  -               User Key  (r) = Recorded By, (t) = Taken By, (c) = Cosigned By      Initials Name Provider Type     Alisia Luna, PT Physical Therapist                    Physical Therapy Education       Title: PT OT SLP Therapies (In Progress)       Topic: Physical Therapy (In Progress)       Point: Mobility training (In Progress)       Learning Progress Summary            Patient Acceptance, E, NR by  at 3/12/2025 1400                      Point: Home exercise program (In Progress)       Learning Progress Summary            Patient Acceptance, E, NR by  at 3/12/2025 1400                      Point: Body mechanics (Not Started)       Learner Progress:  Not documented in this visit.              Point: Precautions (Not Started)       Learner Progress:  Not documented in this visit.                              User Key       Initials Effective Dates Name Provider Type Discipline     06/16/21 -  Alisia Luna, PT Physical Therapist PT                  PT Recommendation and Plan  Anticipated Discharge Disposition (PT): skilled nursing facility  Planned Therapy Interventions (PT): balance training, bed mobility training, gait training, home exercise program, ROM (range of motion), stair training, strengthening, stretching, transfer training  Therapy Frequency (PT): 5  times/wk  Plan of Care Reviewed With: patient  Outcome Evaluation: Pt 70 yo male presented to ED w weakness, fall, PMH end-stage renal disease on dialysis, CAD, type 2 diabetes. Pt lives in trilevel home and uses cane for ambulation. On PT exam pt ambulated 15ft MIn A rwx fwd posturing, gross generalized weakness. Pt may benefit from skilled PT acutely to address functional deficits, anticipate SNU at [place] as this doesnt appear to be patients baseline.   Outcome Measures       Row Name 03/12/25 1400             How much help from another person do you currently need...    Turning from your back to your side while in flat bed without using bedrails? 3  -LH      Moving from lying on back to sitting on the side of a flat bed without bedrails? 3  -LH      Moving to and from a bed to a chair (including a wheelchair)? 3  -LH      Standing up from a chair using your arms (e.g., wheelchair, bedside chair)? 2  -LH      Climbing 3-5 steps with a railing? 2  -LH      To walk in hospital room? 2  -LH      AM-PAC 6 Clicks Score (PT) 15  -         Functional Assessment    Outcome Measure Options [facility] 6 Clicks Basic Mobility (PT)  -                User Key  (r) = Recorded By, (t) = Taken By, (c) = Cosigned By      Initials Name Provider Type     Alisia Luna, PT Physical Therapist                     Time Calculation:    PT Charges       Row Name 03/12/25 1400             Time Calculation    Start Time 1330  -      Stop Time 1350  -      Time Calculation (min) 20 min  -      PT Received On 03/12/25  -      PT - Next Appointment 03/13/25  -      PT Goal Re-Cert Due Date 03/26/25  -         Time Calculation- PT    Total Timed Code Minutes- PT 10 minute(s)  -                User Key  (r) = Recorded By, (t) = Taken By, (c) = Cosigned By      Initials Name Provider Type    Alisia Del Rosario, PT Physical Therapist                  Therapy Charges for Today       Code Description Service Date Service Provider  Modifiers Qty    88060933549 HC PT EVAL MOD COMPLEXITY 2 3/12/2025 Alisia Luna, PT GP 1    38558431952 HC PT THER PROC EA 15 MIN 3/12/2025 Alisia Luna, PT GP 1            PT G-Codes  Outcome Measure Options: AM-PAC 6 Clicks Basic Mobility (PT)  AM-PAC 6 Clicks Score (PT): 15  AM-PAC 6 Clicks Score (OT): 13  Modified Coffee Scale: 4 - Moderately severe disability.  Unable to walk without assistance, and unable to attend to own bodily needs without assistance.    Alisia Luna, PT  3/12/2025

## 2025-03-12 NOTE — PLAN OF CARE
Goal Outcome Evaluation:  Plan of Care Reviewed With: patient           Outcome Evaluation: Pt 70 yo male presented to ED w weakness, fall, PMH end-stage renal disease on dialysis, CAD, type 2 diabetes. Pt lives in trilevel home and uses cane for ambulation. On PT exam pt ambulated 15ft MIn A rwx fwd posturing, gross generalized weakness. Pt may benefit from skilled PT acutely to address functional deficits, anticipate SNU at [place] as this doesnt appear to be patients baseline.    Anticipated Discharge Disposition (PT): skilled nursing facility

## 2025-03-12 NOTE — PLAN OF CARE
Problem: Adult Inpatient Plan of Care  Goal: Patient-Specific Goal (Individualized)  Outcome: Progressing     Problem: Fall Injury Risk  Goal: Absence of Fall and Fall-Related Injury  Outcome: Progressing  Intervention: Promote Injury-Free Environment  Recent Flowsheet Documentation  Taken 3/12/2025 1800 by Jaci Grayson RN  Safety Promotion/Fall Prevention:   activity supervised   safety round/check completed  Taken 3/12/2025 1600 by Jaci Grayson RN  Safety Promotion/Fall Prevention:   activity supervised   safety round/check completed  Taken 3/12/2025 1400 by Jaci Grayson RN  Safety Promotion/Fall Prevention: activity supervised  Taken 3/12/2025 1254 by Jaci Grayson RN  Safety Promotion/Fall Prevention: activity supervised  Taken 3/12/2025 1047 by Jaci Grayson RN  Safety Promotion/Fall Prevention:   activity supervised   safety round/check completed  Taken 3/12/2025 0800 by Jaci Grayson RN  Safety Promotion/Fall Prevention:   activity supervised   safety round/check completed     Problem: Skin Injury Risk Increased  Goal: Skin Health and Integrity  Outcome: Progressing  Intervention: Optimize Skin Protection  Recent Flowsheet Documentation  Taken 3/12/2025 1800 by Jaci Grayson RN  Activity Management: back to bed  Head of Bed (HOB) Positioning: HOB elevated  Taken 3/12/2025 1600 by Jaci Grayson RN  Activity Management: up in chair  Head of Bed (HOB) Positioning: HOB elevated  Taken 3/12/2025 1400 by Jaci Grayson RN  Activity Management: up in chair  Head of Bed (HOB) Positioning: HOB elevated  Taken 3/12/2025 1254 by Jaci Grayson RN  Activity Management: activity encouraged  Pressure Reduction Techniques: frequent weight shift encouraged  Head of Bed (HOB) Positioning: HOB elevated  Pressure Reduction Devices: positioning supports utilized  Taken 3/12/2025 0800 by Jaci Grayson RN  Activity Management: activity encouraged  Head of Bed (HOB) Positioning: HOB elevated   Goal Outcome Evaluation:            Progress: improving

## 2025-03-13 LAB
ANION GAP SERPL CALCULATED.3IONS-SCNC: 11.6 MMOL/L (ref 5–15)
BASOPHILS # BLD AUTO: 0.01 10*3/MM3 (ref 0–0.2)
BASOPHILS NFR BLD AUTO: 0.2 % (ref 0–1.5)
BUN SERPL-MCNC: 48 MG/DL (ref 8–23)
BUN/CREAT SERPL: 13.1 (ref 7–25)
CALCIUM SPEC-SCNC: 8.5 MG/DL (ref 8.6–10.5)
CHLORIDE SERPL-SCNC: 102 MMOL/L (ref 98–107)
CO2 SERPL-SCNC: 23.4 MMOL/L (ref 22–29)
CREAT SERPL-MCNC: 3.67 MG/DL (ref 0.76–1.27)
DEPRECATED RDW RBC AUTO: 47.1 FL (ref 37–54)
EGFRCR SERPLBLD CKD-EPI 2021: 17.1 ML/MIN/1.73
EOSINOPHIL # BLD AUTO: 0.24 10*3/MM3 (ref 0–0.4)
EOSINOPHIL NFR BLD AUTO: 4.2 % (ref 0.3–6.2)
ERYTHROCYTE [DISTWIDTH] IN BLOOD BY AUTOMATED COUNT: 14.6 % (ref 12.3–15.4)
GLUCOSE BLDC GLUCOMTR-MCNC: 122 MG/DL (ref 70–130)
GLUCOSE BLDC GLUCOMTR-MCNC: 126 MG/DL (ref 70–130)
GLUCOSE BLDC GLUCOMTR-MCNC: 212 MG/DL (ref 70–130)
GLUCOSE BLDC GLUCOMTR-MCNC: 222 MG/DL (ref 70–130)
GLUCOSE SERPL-MCNC: 113 MG/DL (ref 65–99)
HBV CORE AB SERPL QL IA: NEGATIVE
HCT VFR BLD AUTO: 28.9 % (ref 37.5–51)
HGB BLD-MCNC: 9 G/DL (ref 13–17.7)
IMM GRANULOCYTES # BLD AUTO: 0.01 10*3/MM3 (ref 0–0.05)
IMM GRANULOCYTES NFR BLD AUTO: 0.2 % (ref 0–0.5)
LYMPHOCYTES # BLD AUTO: 1.04 10*3/MM3 (ref 0.7–3.1)
LYMPHOCYTES NFR BLD AUTO: 18.1 % (ref 19.6–45.3)
MCH RBC QN AUTO: 27.4 PG (ref 26.6–33)
MCHC RBC AUTO-ENTMCNC: 31.1 G/DL (ref 31.5–35.7)
MCV RBC AUTO: 87.8 FL (ref 79–97)
MONOCYTES # BLD AUTO: 0.55 10*3/MM3 (ref 0.1–0.9)
MONOCYTES NFR BLD AUTO: 9.6 % (ref 5–12)
NEUTROPHILS NFR BLD AUTO: 3.9 10*3/MM3 (ref 1.7–7)
NEUTROPHILS NFR BLD AUTO: 67.7 % (ref 42.7–76)
NRBC BLD AUTO-RTO: 0 /100 WBC (ref 0–0.2)
PHOSPHATE SERPL-MCNC: 3.6 MG/DL (ref 2.5–4.5)
PLATELET # BLD AUTO: 220 10*3/MM3 (ref 140–450)
PMV BLD AUTO: 11.2 FL (ref 6–12)
POTASSIUM SERPL-SCNC: 5.1 MMOL/L (ref 3.5–5.2)
RBC # BLD AUTO: 3.29 10*6/MM3 (ref 4.14–5.8)
SODIUM SERPL-SCNC: 137 MMOL/L (ref 136–145)
WBC NRBC COR # BLD AUTO: 5.75 10*3/MM3 (ref 3.4–10.8)

## 2025-03-13 PROCEDURE — 80048 BASIC METABOLIC PNL TOTAL CA: CPT | Performed by: NURSE PRACTITIONER

## 2025-03-13 PROCEDURE — 97530 THERAPEUTIC ACTIVITIES: CPT

## 2025-03-13 PROCEDURE — 82948 REAGENT STRIP/BLOOD GLUCOSE: CPT

## 2025-03-13 PROCEDURE — 85025 COMPLETE CBC W/AUTO DIFF WBC: CPT | Performed by: NURSE PRACTITIONER

## 2025-03-13 PROCEDURE — 5A1D70Z PERFORMANCE OF URINARY FILTRATION, INTERMITTENT, LESS THAN 6 HOURS PER DAY: ICD-10-PCS | Performed by: INTERNAL MEDICINE

## 2025-03-13 PROCEDURE — G0378 HOSPITAL OBSERVATION PER HR: HCPCS

## 2025-03-13 PROCEDURE — 84100 ASSAY OF PHOSPHORUS: CPT | Performed by: INTERNAL MEDICINE

## 2025-03-13 PROCEDURE — 63710000001 INSULIN LISPRO (HUMAN) PER 5 UNITS: Performed by: NURSE PRACTITIONER

## 2025-03-13 RX ORDER — ALBUMIN (HUMAN) 12.5 G/50ML
12.5 SOLUTION INTRAVENOUS AS NEEDED
Status: ACTIVE | OUTPATIENT
Start: 2025-03-13 | End: 2025-03-13

## 2025-03-13 RX ADMIN — INSULIN LISPRO 3 UNITS: 100 INJECTION, SOLUTION INTRAVENOUS; SUBCUTANEOUS at 16:26

## 2025-03-13 RX ADMIN — PANTOPRAZOLE SODIUM 40 MG: 40 TABLET, DELAYED RELEASE ORAL at 07:40

## 2025-03-13 RX ADMIN — Medication 10 ML: at 07:43

## 2025-03-13 RX ADMIN — INSULIN LISPRO 3 UNITS: 100 INJECTION, SOLUTION INTRAVENOUS; SUBCUTANEOUS at 21:30

## 2025-03-13 RX ADMIN — PANTOPRAZOLE SODIUM 40 MG: 40 TABLET, DELAYED RELEASE ORAL at 16:27

## 2025-03-13 RX ADMIN — TAMSULOSIN HYDROCHLORIDE 0.4 MG: 0.4 CAPSULE ORAL at 16:29

## 2025-03-13 RX ADMIN — APIXABAN 2.5 MG: 2.5 TABLET, FILM COATED ORAL at 21:30

## 2025-03-13 RX ADMIN — LEVOTHYROXINE SODIUM 75 MCG: 0.07 TABLET ORAL at 05:02

## 2025-03-13 RX ADMIN — ATORVASTATIN CALCIUM 80 MG: 80 TABLET, FILM COATED ORAL at 21:30

## 2025-03-13 RX ADMIN — ASPIRIN 325 MG: 325 TABLET, COATED ORAL at 16:29

## 2025-03-13 NOTE — PLAN OF CARE
Pt. is A&O X 4. No complain of pain. Pt. is a HD patient and is planning to HD today. AC&HS. Insulin no given at night. RA. VSS. Urinal at bedside. Call light within reach.     Goal Outcome Evaluation:  Plan of Care Reviewed With: patient  Progress: improving     Problem: Adult Inpatient Plan of Care  Goal: Plan of Care Review  Outcome: Progressing  Flowsheets (Taken 3/13/2025 0428)  Progress: improving  Plan of Care Reviewed With: patient  Goal: Patient-Specific Goal (Individualized)  Outcome: Progressing  Goal: Absence of Hospital-Acquired Illness or Injury  Outcome: Progressing  Intervention: Identify and Manage Fall Risk  Recent Flowsheet Documentation  Taken 3/13/2025 0403 by Freddy Watkins RN  Safety Promotion/Fall Prevention:   safety round/check completed   room organization consistent   lighting adjusted   fall prevention program maintained   clutter free environment maintained   activity supervised  Taken 3/13/2025 0200 by Freddy Watkins RN  Safety Promotion/Fall Prevention:   safety round/check completed   room organization consistent   lighting adjusted   fall prevention program maintained   clutter free environment maintained   activity supervised  Taken 3/13/2025 0001 by Freddy Watkins RN  Safety Promotion/Fall Prevention:   safety round/check completed   room organization consistent   lighting adjusted   fall prevention program maintained   clutter free environment maintained   activity supervised  Taken 3/12/2025 2201 by Freddy Watkins RN  Safety Promotion/Fall Prevention:   safety round/check completed   room organization consistent   lighting adjusted   fall prevention program maintained   clutter free environment maintained   activity supervised  Taken 3/12/2025 2114 by Freddy Watkins RN  Safety Promotion/Fall Prevention:   safety round/check completed   room organization consistent   lighting adjusted   fall prevention program maintained   clutter free environment maintained   activity supervised  Taken  3/12/2025 1930 by Freddy Watkins RN  Safety Promotion/Fall Prevention:   safety round/check completed   room organization consistent   lighting adjusted   fall prevention program maintained   clutter free environment maintained   activity supervised  Intervention: Prevent Skin Injury  Recent Flowsheet Documentation  Taken 3/13/2025 0403 by Freddy Watkins RN  Body Position: position changed independently  Taken 3/13/2025 0200 by Freddy Watkins RN  Body Position: position changed independently  Taken 3/13/2025 0001 by Freddy Watkins RN  Body Position: position changed independently  Taken 3/12/2025 2201 by Freddy Watkins RN  Body Position: position changed independently  Taken 3/12/2025 2114 by Freddy Watkins RN  Body Position: position changed independently  Taken 3/12/2025 1930 by Freddy Watkins RN  Body Position: position changed independently  Intervention: Prevent and Manage VTE (Venous Thromboembolism) Risk  Recent Flowsheet Documentation  Taken 3/12/2025 2114 by Freddy Watkins RN  VTE Prevention/Management: (Eliquis treatment)   SCDs (sequential compression devices) off   patient refused intervention  Intervention: Prevent Infection  Recent Flowsheet Documentation  Taken 3/13/2025 0403 by Freddy Watkins RN  Infection Prevention:   rest/sleep promoted   single patient room provided   hand hygiene promoted  Taken 3/13/2025 0200 by Freddy Watkins RN  Infection Prevention:   rest/sleep promoted   single patient room provided   hand hygiene promoted  Taken 3/13/2025 0001 by Freddy Watkins RN  Infection Prevention:   rest/sleep promoted   single patient room provided   hand hygiene promoted  Taken 3/12/2025 2201 by Fredyd Watkins RN  Infection Prevention:   rest/sleep promoted   single patient room provided   hand hygiene promoted  Taken 3/12/2025 2114 by Freddy Watkins RN  Infection Prevention:   rest/sleep promoted   single patient room provided   hand hygiene promoted  Taken 3/12/2025 1930 by Freddy Watkins RN  Infection Prevention:   rest/sleep  promoted   single patient room provided   hand hygiene promoted  Goal: Optimal Comfort and Wellbeing  Outcome: Progressing  Intervention: Provide Person-Centered Care  Recent Flowsheet Documentation  Taken 3/12/2025 2114 by Freddy Watkins RN  Trust Relationship/Rapport:   care explained   choices provided   emotional support provided   empathic listening provided   questions answered   questions encouraged  Goal: Readiness for Transition of Care  Outcome: Progressing  Goal: Plan of Care Review  Outcome: Progressing  Flowsheets (Taken 3/13/2025 0428)  Progress: improving  Plan of Care Reviewed With: patient  Goal: Patient-Specific Goal (Individualized)  Outcome: Progressing  Goal: Absence of Hospital-Acquired Illness or Injury  Outcome: Progressing  Intervention: Identify and Manage Fall Risk  Recent Flowsheet Documentation  Taken 3/13/2025 0403 by Freddy Watkins RN  Safety Promotion/Fall Prevention:   safety round/check completed   room organization consistent   lighting adjusted   fall prevention program maintained   clutter free environment maintained   activity supervised  Taken 3/13/2025 0200 by Freddy Watkins RN  Safety Promotion/Fall Prevention:   safety round/check completed   room organization consistent   lighting adjusted   fall prevention program maintained   clutter free environment maintained   activity supervised  Taken 3/13/2025 0001 by Freddy Watkins RN  Safety Promotion/Fall Prevention:   safety round/check completed   room organization consistent   lighting adjusted   fall prevention program maintained   clutter free environment maintained   activity supervised  Taken 3/12/2025 2201 by Freddy Watkins RN  Safety Promotion/Fall Prevention:   safety round/check completed   room organization consistent   lighting adjusted   fall prevention program maintained   clutter free environment maintained   activity supervised  Taken 3/12/2025 2114 by Freddy Watkins RN  Safety Promotion/Fall Prevention:   safety round/check  completed   room organization consistent   lighting adjusted   fall prevention program maintained   clutter free environment maintained   activity supervised  Taken 3/12/2025 1930 by Freddy Watkins RN  Safety Promotion/Fall Prevention:   safety round/check completed   room organization consistent   lighting adjusted   fall prevention program maintained   clutter free environment maintained   activity supervised  Intervention: Prevent Skin Injury  Recent Flowsheet Documentation  Taken 3/13/2025 0403 by Freddy Watkins RN  Body Position: position changed independently  Taken 3/13/2025 0200 by Freddy Watkins RN  Body Position: position changed independently  Taken 3/13/2025 0001 by Freddy Watkins RN  Body Position: position changed independently  Taken 3/12/2025 2201 by Freddy Watkins RN  Body Position: position changed independently  Taken 3/12/2025 2114 by Freddy Watkins RN  Body Position: position changed independently  Taken 3/12/2025 1930 by Freddy Watkins RN  Body Position: position changed independently  Intervention: Prevent and Manage VTE (Venous Thromboembolism) Risk  Recent Flowsheet Documentation  Taken 3/12/2025 2114 by Freddy Watkins RN  VTE Prevention/Management: (Eliquis treatment)   SCDs (sequential compression devices) off   patient refused intervention  Intervention: Prevent Infection  Recent Flowsheet Documentation  Taken 3/13/2025 0403 by Freddy Watkins RN  Infection Prevention:   rest/sleep promoted   single patient room provided   hand hygiene promoted  Taken 3/13/2025 0200 by Freddy Watkins RN  Infection Prevention:   rest/sleep promoted   single patient room provided   hand hygiene promoted  Taken 3/13/2025 0001 by Freddy Watkins RN  Infection Prevention:   rest/sleep promoted   single patient room provided   hand hygiene promoted  Taken 3/12/2025 2201 by Freddy Watkins RN  Infection Prevention:   rest/sleep promoted   single patient room provided   hand hygiene promoted  Taken 3/12/2025 2114 by Freddy Watkins RN  Infection  Prevention:   rest/sleep promoted   single patient room provided   hand hygiene promoted  Taken 3/12/2025 1930 by Freddy Watkins RN  Infection Prevention:   rest/sleep promoted   single patient room provided   hand hygiene promoted  Goal: Optimal Comfort and Wellbeing  Outcome: Progressing  Intervention: Provide Person-Centered Care  Recent Flowsheet Documentation  Taken 3/12/2025 2114 by Freddy Watkins RN  Trust Relationship/Rapport:   care explained   choices provided   emotional support provided   empathic listening provided   questions answered   questions encouraged  Goal: Readiness for Transition of Care  Outcome: Progressing     Problem: Fall Injury Risk  Goal: Absence of Fall and Fall-Related Injury  Outcome: Progressing  Intervention: Identify and Manage Contributors  Recent Flowsheet Documentation  Taken 3/12/2025 2114 by Freddy Watkins RN  Medication Review/Management: medications reviewed  Intervention: Promote Injury-Free Environment  Recent Flowsheet Documentation  Taken 3/13/2025 0403 by Freddy Watkins RN  Safety Promotion/Fall Prevention:   safety round/check completed   room organization consistent   lighting adjusted   fall prevention program maintained   clutter free environment maintained   activity supervised  Taken 3/13/2025 0200 by Freddy Watkins RN  Safety Promotion/Fall Prevention:   safety round/check completed   room organization consistent   lighting adjusted   fall prevention program maintained   clutter free environment maintained   activity supervised  Taken 3/13/2025 0001 by rFeddy Watkins RN  Safety Promotion/Fall Prevention:   safety round/check completed   room organization consistent   lighting adjusted   fall prevention program maintained   clutter free environment maintained   activity supervised  Taken 3/12/2025 2201 by Freddy Watkins RN  Safety Promotion/Fall Prevention:   safety round/check completed   room organization consistent   lighting adjusted   fall prevention program maintained   clutter  free environment maintained   activity supervised  Taken 3/12/2025 2114 by Freddy Watkins RN  Safety Promotion/Fall Prevention:   safety round/check completed   room organization consistent   lighting adjusted   fall prevention program maintained   clutter free environment maintained   activity supervised  Taken 3/12/2025 1930 by Freddy Watkins RN  Safety Promotion/Fall Prevention:   safety round/check completed   room organization consistent   lighting adjusted   fall prevention program maintained   clutter free environment maintained   activity supervised     Problem: Skin Injury Risk Increased  Goal: Skin Health and Integrity  Outcome: Progressing  Intervention: Optimize Skin Protection  Recent Flowsheet Documentation  Taken 3/13/2025 0403 by Freddy Watkins RN  Activity Management: activity encouraged  Head of Bed (HOB) Positioning: HOB elevated  Taken 3/13/2025 0200 by Freddy Watkins RN  Activity Management: activity encouraged  Head of Bed (HOB) Positioning: HOB elevated  Taken 3/13/2025 0001 by Freddy Watkins RN  Activity Management: activity encouraged  Head of Bed (HOB) Positioning: HOB elevated  Taken 3/12/2025 2201 by Freddy Watkins RN  Activity Management: activity encouraged  Head of Bed (HOB) Positioning: HOB elevated  Taken 3/12/2025 2114 by Freddy Watkins RN  Activity Management: activity encouraged  Pressure Reduction Techniques: frequent weight shift encouraged  Head of Bed (HOB) Positioning: HOB elevated  Pressure Reduction Devices: positioning supports utilized  Taken 3/12/2025 1930 by Freddy Watkins RN  Activity Management: activity encouraged  Head of Bed (HOB) Positioning: HOB elevated

## 2025-03-13 NOTE — PLAN OF CARE
Goal Outcome Evaluation:  Plan of Care Reviewed With: patient        Progress: no change  Outcome Evaluation: Pt is A&OX4, with response lag and slow speech pattern. Con/Incon of B&B, pt had incon BM this afternoon, pt uses urinal. HD today per order. Pt very tired after. Generalized weakness noted. Assist X1-2. Meds whole with water. No c/o pain at this time. Pt was able to set up in recliner chair this afternoon with OT. Pt educated on use of call light for assistance.

## 2025-03-13 NOTE — PLAN OF CARE
Goal Outcome Evaluation:           Progress: improving  Outcome Evaluation: Pt participatory in skilled tx with in-room mobility, chair transfer. Pt requires CGA with increased time with RW for mobility, however, received following HD with c/o increased fatigue. Pt does tolerate increased OOB compared to prior session, continues to present below baseline. Recommend YING at discharge to maximzie stregnth, endurance, independence.    Anticipated Discharge Disposition (OT): skilled nursing facility

## 2025-03-13 NOTE — NURSING NOTE
Dialysis complete, removed 1.5 liters as ordered and no complications noted.  Pre and post vitals are in epic.  Report given to Adrianna Daniel r.n.

## 2025-03-13 NOTE — PROGRESS NOTES
Name: Carlito Mo ADMIT: 3/10/2025   : 1955  PCP: Belle Simons APRN    MRN: [id number] LOS: 0 days   AGE/SEX: 69 y.o. male  ROOM: [facility]     Subjective   Subjective   Mr. Mo  is poor historian, but is more conversational today. I am seeing him in the dialysis unit as he is just finished up his HD treatment for today.  His only complaint today is that he is a little tired and very hungry. He verbalizes understanding we are awaiting placement for rehab.   Objective   Objective   Vital Signs  Temp:  [97.5 °F (36.4 °C)-98.4 °F (36.9 °C)] 98 °F (36.7 °C)  Heart Rate:  [64-78] 64  Resp:  [14-18] 18  BP: (121-152)/(57-76) 143/59  SpO2:  [96 %-97 %] 96 %  on   ;   Device (Oxygen Therapy): room air  Body mass index is 19.78 kg/m².  Physical Exam  Vitals and nursing note reviewed.   Constitutional:       Appearance: He is ill-appearing.   HENT:      Head: Atraumatic.      Mouth/Throat:      Mouth: Mucous membranes are dry.   Eyes:      Conjunctiva/sclera: Conjunctivae normal.   Cardiovascular:      Rate and Rhythm: Normal rate. Rhythm irregular.      Pulses: Normal pulses.           Radial pulses are 2+ on the right side and 2+ on the left side.      Heart sounds: Normal heart sounds.   Pulmonary:      Effort: Pulmonary effort is normal.   Abdominal:      General: Bowel sounds are normal.      Palpations: Abdomen is soft.   Musculoskeletal:      Right lower leg: Edema present.   Skin:     General: Skin is warm and dry.      Capillary Refill: Capillary refill takes less than 2 seconds.      Coloration: Skin is pale.   Neurological:      General: No focal deficit present.      Mental Status: He is alert. Mental status is at baseline. He is confused.   Psychiatric:         Mood and Affect: Mood normal.       Results Review     I reviewed the patient's new clinical results.  Results from last 7 days   Lab Units 25  0501 25  0518 25  0500 03/10/25  1854   WBC 10*3/mm3 5.75 6.18 6.55 5.80    HEMOGLOBIN g/dL 9.0* 8.9* 9.3* 10.9*   PLATELETS 10*3/mm3 220 209 213 246     Results from last 7 days   Lab Units 03/13/25  0501 03/12/25  0518 03/11/25  0500 03/10/25  1854   SODIUM mmol/L 137 137 137 133*   POTASSIUM mmol/L 5.1 4.7 5.0 5.2   CHLORIDE mmol/L 102 104 100 96*   CO2 mmol/L 23.4 24.0 27.6 29.3*   BUN mg/dL 48* 37* 48* 48*   CREATININE mg/dL 3.67* 2.82* 3.30* 3.78*   GLUCOSE mg/dL 113* 96 183* 332*   EGFR mL/min/1.73 17.1* 23.5* 19.4* 16.5*     Results from last 7 days   Lab Units 03/12/25  0518 03/10/25  1854   ALBUMIN g/dL 3.3* 3.5   BILIRUBIN mg/dL  --  0.4   ALK PHOS U/L  --  104   AST (SGOT) U/L  --  17   ALT (SGPT) U/L  --  16     Results from last 7 days   Lab Units 03/13/25  0501 03/12/25  0518 03/11/25  0500 03/10/25  1854   CALCIUM mg/dL 8.5* 8.5* 8.6 9.4   ALBUMIN g/dL  --  3.3*  --  3.5   PHOSPHORUS mg/dL 3.6 3.2  --   --        Hemoglobin A1C   Date/Time Value Ref Range Status   03/10/2025 2323 7.10 (H) 4.80 - 5.60 % Final     Glucose   Date/Time Value Ref Range Status   03/13/2025 0736 122 70 - 130 mg/dL Final   03/12/2025 2108 97 70 - 130 mg/dL Final   03/12/2025 1549 229 (H) 70 - 130 mg/dL Final   03/12/2025 1046 248 (H) 70 - 130 mg/dL Final   03/12/2025 0620 95 70 - 130 mg/dL Final   03/11/2025 2010 217 (H) 70 - 130 mg/dL Final   03/11/2025 1603 199 (H) 70 - 130 mg/dL Final       No radiology results for the last day      I have personally reviewed all medications:  Scheduled Medications  apixaban, 2.5 mg, Oral, BID  aspirin, 325 mg, Oral, Daily  atorvastatin, 80 mg, Oral, Nightly  insulin lispro, 2-7 Units, Subcutaneous, 4x Daily AC & at Bedtime  levothyroxine, 75 mcg, Oral, Q AM  pantoprazole, 40 mg, Oral, BID AC  sodium chloride, 10 mL, Intravenous, Q12H  tamsulosin, 0.4 mg, Oral, Daily    Infusions   Diet  Diet: Diabetic; Consistent Carbohydrate; Texture: Regular (IDDSI 7); Fluid Consistency: Thin (IDDSI 0)    I have personally reviewed:  [x]  Laboratory   [x]  Microbiology   [x]   Radiology   [x]  EKG/Telemetry  [x]  Cardiology/Vascular   []  Pathology    []  Records       Assessment/Plan     Active Hospital Problems    Diagnosis  POA   • **Generalized weakness [R53.1]  Yes   • Chronic HFrEF (heart failure with reduced ejection fraction) [I50.22]  Yes   • ESRD (end stage renal disease) [N18.6]  Yes   • History of stroke [Z86.73]  Not Applicable   • Paroxysmal atrial fibrillation [I48.0]  Yes   • Embolic stroke [I63.9]  Yes   • Essential hypertension [I10]  Yes   • Coronary artery disease involving native coronary artery of native heart without angina pectoris [I25.10]  Yes   • Acquired hypothyroidism [E03.9]  Yes   • Type 2 diabetes mellitus, with long-term current use of insulin [E11.9, Z79.4]  Not Applicable      Resolved Hospital Problems   No resolved problems to display.       69 y.o. male admitted with Generalized weakness.    Generalized weakness  Fall with right hip pain at home  Initial x-ray at time of admission was unremarkable for any acute fracture  He continues to deny any acute pain   PT OT following recommend SNF/acute rehab at discharge - He is stable for discharge pending placement   CCP following and coordinating     ESRD  Hemodialysis dependent-last HD on 3/7   Normal sessions Monday, Wednesday, Friday  Nephrology following  HD this a.m.  Will continue to monitor appreciate nephrology's evaluation, assistance and recommendations.  HD per Nephrology today 1.5L removed   Monitor electrolytes and replace as needed    Anemia of Chronic disease   HBG  9.0  Will continue to monitor and use for symptomatic anemia, hemoglobin less than 7.      Coronary artery disease  History of CVA-memory deficits noted  Elevated troponin levels  Patient is free of chest pain  Initial elevation of his troponin suspected to be secondary to CKD  Continue ASA, statin    Proximal atrial fibrillation  No pharmacological agent for rate control currently  On chronic anticoagulation with  Eliquis    HFrEF  Chronic  Most recent echocardiogram LVEF 47.7%  proBNP is elevated, at his baseline and stable.  He appears euvolemic  Continue to monitor I's and O's.  Daily weights    Hypothyroidism  Continue home levothyroxine    BPH  Continue home tamsulosin    Type II DM   Continue Accu-Cheks ACHS  He continues to be hyperglycemic-intermittently  Not on  home insulin, or oral antidiabetics  Continue SSI for hyperglycemia  Continue glycemia treatment per protocol  A1c 7.10  Changed diet to consistent carb diet from regular            Eliquis (home med) for DVT prophylaxis.  Full code.  Discussed with patient, nursing staff, and care team on multidisciplinary rounds.  Anticipate discharge home later this week.  Expected Discharge Date: 3/14/2025; Expected Discharge Time:       JI Hurtado  [facility]  03/13/25  12:52 EDT

## 2025-03-13 NOTE — THERAPY TREATMENT NOTE
Patient Name: Carlito Mo  : 1955    MRN: [id number]                              Today's Date: 3/13/2025       Admit Date: 3/10/2025    Visit Dx:     ICD-10-CM ICD-9-CM   1. Generalized weakness  R53.1 780.79   2. ESRD on dialysis  N18.6 585.6    Z99.2 V45.11   3. Fall, initial encounter  W19.XXXA E888.9     Patient Active Problem List   Diagnosis    Major depressive disorder with single episode, in partial remission    Other hyperlipidemia    Type 2 diabetes mellitus, with long-term current use of insulin    Vitamin D deficiency    Erectile dysfunction    Chronic fatigue    Type 2 diabetes mellitus with ophthalmic complication    Benign prostatic hyperplasia with nocturia    History of basal cell carcinoma    Acquired hypothyroidism    Recurrent strokes    Suspected sleep apnea    YAW (obstructive sleep apnea)    Coronary artery disease involving native coronary artery of native heart without angina pectoris    Chronically elevated troponin    Colon cancer screening    Enlarged lymph nodes    Functional gait abnormality    History of myocardial infarction    Insomnia    Iron deficiency anemia    Major depression, recurrent    Essential hypertension    Acute on chronic renal insufficiency    Falls frequently    Acute on chronic anemia    Neurogenic orthostatic hypotension    Anemia, chronic disease    History of colon polyps    Helicobacter pylori gastritis    Esophagitis    Embolic stroke    Patent foramen ovale    Cardiomyopathy    Diarrhea    Generalized weakness    Paroxysmal atrial fibrillation    Chronic anticoagulation    Acute ischemic stroke    History of stroke    Iron deficiency anemia    Acute HFrEF (heart failure with reduced ejection fraction)    ESRD (end stage renal disease)    Hemoptysis    Chronic HFrEF (heart failure with reduced ejection fraction)    Hemodialysis patient    Exertional dyspnea    Severe malnutrition    Ataxia    Cerebellar stroke, acute    Medically noncompliant     Vertebral artery stenosis, bilateral    Carotid stenosis, right     Past Medical History:   Diagnosis Date    Acquired hypothyroidism     Acute sinusitis     SYLVAIN (acute kidney injury)     on CKD    Anemia     Arthritis     Atrial fibrillation     CAD (coronary artery disease)     Chronic combined systolic and diastolic congestive heart failure     COPD (chronic obstructive pulmonary disease)     Depression     Diabetic neuropathy 04/11/2022    Diabetes mellitus due to underlying condition    Esophagitis 06/10/2020    Added automatically from request for surgery [id number]      ESRD (end stage renal disease) 12/01/2023    Frequent PVCs     Generalized weakness     GERD (gastroesophageal reflux disease)     Helicobacter pylori gastritis 06/04/2020    Hyperlipidemia     Hypertension     Hypertensive encephalopathy     Pleural effusion     Pneumonia     Poor historian     RBBB (right bundle branch block)     Stroke     POOR VISION    TIA (transient ischemic attack)     Type 2 diabetes mellitus     Urinary retention     Vitamin D deficiency      Past Surgical History:   Procedure Laterality Date    APPENDECTOMY N/A 02/14/2016    Dr. Alexey Dodson    ARTERIOVENOUS FISTULA/SHUNT SURGERY Right 06/03/2022    Procedure: RIGHT FOREARM ARTERIOVENOUS FISTULA PLACEMENT RADIOCEPHALIC WITH CEPHALIC VEIN TRANSPOSITION;  Surgeon: Eliseo Willis MD;  Location: [facility] MAIN OR;  Service: Vascular;  Laterality: Right;    COLONOSCOPY      over 20 years ago.  no polyps     COLONOSCOPY N/A 09/18/2018    Procedure: COLONOSCOPY;  Surgeon: Jose Enrique Louie MD;  Location: [facility] ENDOSCOPY;  Service: Gastroenterology    COLONOSCOPY N/A 09/19/2018    IH, EH, polyps (TAs w/low grade dysplasia)    COLONOSCOPY N/A 06/01/2020    Procedure: COLONOSCOPY;  Surgeon: Jose Enrique Louie MD;  Location: [facility] ENDOSCOPY;  Service: Gastroenterology;  Laterality: N/A;  Pre op-Anemia, Melena, History of Polyps  Post op-To Cecum/TI, Polyp, Poor Prep     CORONARY ARTERY BYPASS GRAFT  11/2001    ENDOSCOPY N/A 05/29/2020    Procedure: ESOPHAGOGASTRODUODENOSCOPY with biopsies;  Surgeon: Amanda Lovelace MD;  Location: [facility] ENDOSCOPY;  Service: Gastroenterology;  Laterality: N/A;  pre-anemia, dark stools  post-esophagitis, hiatal hernia    ENDOSCOPY N/A 09/15/2020    Procedure: ESOPHAGOGASTRODUODENOSCOPY WITH BIOPSIES;  Surgeon: Jose Enrique Louie MD;  Location: [facility] ENDOSCOPY;  Service: Gastroenterology;  Laterality: N/A;  pre: history of melena and esophagitis  post: mild esophagitis and gastritis, small hiatal hernia    ENDOSCOPY N/A 12/4/2023    Procedure: ESOPHAGOGASTRODUODENOSCOPY with bx;  Surgeon: Quoc Elmore MD;  Location: [facility] ENDOSCOPY;  Service: Gastroenterology;  Laterality: N/A;  pre: Coffee-ground emesis  post: hiatal hernia, esophagitis    HERNIA REPAIR Left 12/05/2019    THORACENTESIS      TONSILLECTOMY      VASECTOMY        General Information       Row Name 03/13/25 1518          OT Time and Intention    Subjective Information fatigue;complains of  following HD  -HE     Document Type therapy note (daily note)  -HE     Mode of Treatment occupational therapy  -HE     Patient Effort good  -HE     Symptoms Noted During/After Treatment fatigue  -       Row Name 03/13/25 1518          General Information    Patient Profile Reviewed yes  -HE     Existing Precautions/Restrictions fall  -       Row Name 03/13/25 1518          Cognition    Orientation Status (Cognition) oriented x 3  -       Row Name 03/13/25 1518          Safety Issues/Impairments Affecting Functional Mobility    Impairments Affecting Function (Mobility) balance;strength;endurance/activity tolerance  -               User Key  (r) = Recorded By, (t) = Taken By, (c) = Cosigned By      Initials Name Provider Type    HE Ethel Lucas OT Occupational Therapist                     Mobility/ADL's       Row Name 03/13/25 1518          Bed Mobility    Bed Mobility supine-sit   -HE     Supine-Sit [place] (Bed Mobility) supervision  -HE     Assistive Device (Bed Mobility) bed rails;head of bed elevated  -HE     Comment, (Bed Mobility) increased time  -       Row Name 03/13/25 1518          Transfers    Transfers sit-stand transfer;bed-chair transfer  -       Row Name 03/13/25 1518          Bed-Chair Transfer    Bed-Chair [place] (Transfers) contact guard  -HE     Assistive Device (Bed-Chair Transfers) walker, front-wheeled  -HE     Comment, (Bed-Chair Transfer) increased time for transfer completion  -       Row Name 03/13/25 1518          Sit-Stand Transfer    Sit-Stand [place] (Transfers) minimum assist (75% patient effort)  -     Assistive Device (Sit-Stand Transfers) walker, front-wheeled  -HE       Row Name 03/13/25 1518          Stand-Sit Transfer    Stand-Sit [place] (Transfers) verbal cues;contact guard  -HE     Assistive Device (Stand-Sit Transfers) walker, front-wheeled  -HE     Comment, (Stand-Sit Transfer) cues for hand placement  -       Row Name 03/13/25 1518          Functional Mobility    Functional Mobility- Ind. Level contact guard assist  -HE     Functional Mobility- Device walker, front-wheeled  -HE     Functional Mobility- Comment functional mobility to chair requiring increased time 2/2 slow pace following HD  -HE     Patient was able to Ambulate yes  -HE               User Key  (r) = Recorded By, (t) = Taken By, (c) = Cosigned By      Initials Name Provider Type    Ethel Platt OT Occupational Therapist                   Obj/Interventions       Row Name 03/13/25 1523          Balance    Comment, Balance CGA with RW for mobility to chair  -HE               User Key  (r) = Recorded By, (t) = Taken By, (c) = Cosigned By      Initials Name Provider Type    Ethel Platt OT Occupational Therapist                   Goals/Plan       Row Name 03/13/25 1526          Bed Mobility Goal 1 (OT)    Progress/Outcomes (Bed Mobility Goal 1,  "OT) goal ongoing  -       Row Name 03/13/25 1526          Transfer Goal 1 (OT)    Progress/Outcome (Transfer Goal 1, OT) goal ongoing  -[facility] Name 03/13/25 1526          Bathing Goal 1 (OT)    Progress/Outcomes (Bathing Goal 1, OT) goal ongoing  -[facility] Name 03/13/25 1526          Dressing Goal 1 (OT)    Progress/Outcome (Dressing Goal 1, OT) goal ongoing  -[facility] Name 03/13/25 1526          Toileting Goal 1 (OT)    Progress/Outcome (Toileting Goal 1, OT) goal ongoing  -[facility] Name 03/13/25 1526          Grooming Goal 1 (OT)    Progress/Outcome (Grooming Goal 1, OT) goal ongoing  -               User Key  (r) = Recorded By, (t) = Taken By, (c) = Cosigned By      Initials Name Provider Type    Ethel Platt, DANGELO Occupational Therapist                   Clinical Impression       [facility] Name 03/13/25 1523          Pain Assessment    Pre/Posttreatment Pain Comment pt reports no pain, \"mostly tired\"  -[facility] Name 03/13/25 1523          Plan of Care Review    Progress improving  -HE     Outcome Evaluation Pt participatory in skilled tx with in-room mobility, chair transfer. Pt requires CGA with increased time with RW for mobility, however, received following HD with c/o increased fatigue. Pt does tolerate increased OOB compared to prior session, continues to present below baseline. Recommend YING at discharge to maximzie stregnth, endurance, independence.  -[facility] Name 03/13/25 1523          Therapy Assessment/Plan (OT)    Rehab Potential (OT) good  -     Criteria for Skilled Therapeutic Interventions Met (OT) yes;meets criteria  -     Therapy Frequency (OT) 5 times/wk  -[facility] Name 03/13/25 1523          Therapy Plan Review/Discharge Plan (OT)    Anticipated Discharge Disposition (OT) skilled nursing facility  -[facility] Name 03/13/25 1523          Positioning and Restraints    Pre-Treatment Position in bed  -HE     Post Treatment Position chair  -HE     In Chair call " light within reach;exit alarm on;notified nsg  -               User Key  (r) = Recorded By, (t) = Taken By, (c) = Cosigned By      Initials Name Provider Type    Ethel Platt OT Occupational Therapist                   Outcome Measures    No documentation.                   Occupational Therapy Education       Title: PT OT SLP Therapies (In Progress)       Topic: Occupational Therapy (In Progress)       Point: ADL training (In Progress)       Learning Progress Summary            Patient Acceptance, E, NR by  at 3/13/2025 1527    Comment: Pt educated on goals of session, safe ambulation, d/c recommendations. Continue education.                      Point: Home exercise program (In Progress)       Learning Progress Summary            Patient Acceptance, E, NR by  at 3/13/2025 1527    Comment: Pt educated on goals of session, safe ambulation, d/c recommendations. Continue education.                      Point: Precautions (In Progress)       Learning Progress Summary            Patient Acceptance, E, NR by  at 3/13/2025 1527    Comment: Pt educated on goals of session, safe ambulation, d/c recommendations. Continue education.                      Point: Body mechanics (In Progress)       Learning Progress Summary            Patient Acceptance, E, NR by HE at 3/13/2025 1527    Comment: Pt educated on goals of session, safe ambulation, d/c recommendations. Continue education.                                      User Key       Initials Effective Dates Name Provider Type Discipline     02/18/25 -  Ethel Lucas OT Occupational Therapist OT                  OT Recommendation and Plan  Therapy Frequency (OT): 5 times/wk  Plan of Care Review  Progress: improving  Outcome Evaluation: Pt participatory in skilled tx with in-room mobility, chair transfer. Pt requires CGA with increased time with RW for mobility, however, received following HD with c/o increased fatigue. Pt does tolerate increased OOB compared  to prior session, continues to present below baseline. Recommend YING at discharge to maximzie stregnth, endurance, independence.     Time Calculation:         Time Calculation- OT       Row Name 03/13/25 1527             Time Calculation- OT    OT Start Time 1446  -HE      OT Stop Time 1501  -HE      OT Time Calculation (min) 15 min  -HE      OT Received On 03/13/25  -HE      OT - Next Appointment 03/14/25  -HE         Timed Charges    65912 - OT Therapeutic Activity Minutes 15  -HE         Total Minutes    Timed Charges Total Minutes 15  -HE       Total Minutes 15  -HE                User Key  (r) = Recorded By, (t) = Taken By, (c) = Cosigned By      Initials Name Provider Type    Ethel Platt OT Occupational Therapist                  Therapy Charges for Today       Code Description Service Date Service Provider Modifiers Qty    [id number]  OT THERAPEUTIC ACT EA 15 MIN 3/13/2025 Ethel Luacs OT GO 1                 Ethel Lucas OT  3/13/2025

## 2025-03-13 NOTE — CASE MANAGEMENT/SOCIAL WORK
Continued Stay Note  [facility]     Patient Name: Carlito Mo  MRN: [id number]  Today's Date: 3/13/2025    Admit Date: 3/10/2025    Plan: SNF - referral to [facility] pending   Discharge Plan       Row Name 03/13/25 1553       Plan    Plan SNF - referral to [facility] pending    Plan Comments S/W [facility] - eval is still pending at this time.  Will require precert once approved. CCP will followup tomorrow. ............Heike GONZALES/ CCP                   Discharge Codes    No documentation.                 Expected Discharge Date and Time       Expected Discharge Date Expected Discharge Time    Mar 14, 2025               Heike Sellers RN

## 2025-03-13 NOTE — PROGRESS NOTES
[facility] Progress Note      Patient Name: Carlito Mo  : 1955  MRN: [id number]  Primary Care Physician:  B[place] APRN  Date of admission: 3/10/2025    Subjective     Interval History:   F/u ESRD     Review of Systems:   HD completed, removed 1.5L w/o incident  No dyspnea, cramping, chest pain, SOA, or N/V/D    Objective     Vitals:   Temp:  [97.5 °F (36.4 °C)-98.4 °F (36.9 °C)] 98 °F (36.7 °C)  Heart Rate:  [64-78] 65  Resp:  [14-18] 18  BP: (121-152)/(57-76) 131/58    Intake/Output Summary (Last 24 hours) at 3/13/2025 1509  Last data filed at 3/13/2025 1307  Gross per 24 hour   Intake 1010 ml   Output 2465 ml   Net -1455 ml       Physical Exam:    General Appearance: frail WM no distress  Neck: supple, no JVD  Lungs: CTA bilat no rales   Heart: RRR, normal S1 and S2  Abdomen: soft, nontender, nondistended  Extremities: no edema, cyanosis or clubbing; RUE AVF cannulated      Scheduled Meds:     apixaban, 2.5 mg, Oral, BID  aspirin, 325 mg, Oral, Daily  atorvastatin, 80 mg, Oral, Nightly  insulin lispro, 2-7 Units, Subcutaneous, 4x Daily AC & at Bedtime  levothyroxine, 75 mcg, Oral, Q AM  pantoprazole, 40 mg, Oral, BID AC  sodium chloride, 10 mL, Intravenous, Q12H  tamsulosin, 0.4 mg, Oral, Daily      IV Meds:        Results Reviewed:   I have personally reviewed the results from the time of this admission to 3/13/2025 15:09 EDT     Results from last 7 days   Lab Units 25  0501 25  0518 25  0500 03/10/25  1854   SODIUM mmol/L 137 137 137 133*   POTASSIUM mmol/L 5.1 4.7 5.0 5.2   CHLORIDE mmol/L 102 104 100 96*   CO2 mmol/L 23.4 24.0 27.6 29.3*   BUN mg/dL 48* 37* 48* 48*   CREATININE mg/dL 3.67* 2.82* 3.30* 3.78*   CALCIUM mg/dL 8.5* 8.5* 8.6 9.4   BILIRUBIN mg/dL  --   --   --  0.4   ALK PHOS U/L  --   --   --  104   ALT (SGPT) U/L  --   --   --  16   AST (SGOT) U/L  --   --   --  17   GLUCOSE mg/dL 113* 96 183* 332*     Estimated Creatinine  Clearance: 17.8 mL/min (A) (by C-G formula based on SCr of 3.67 mg/dL (H)).  Results from last 7 days   Lab Units 03/13/25  0501 03/12/25  0518   PHOSPHORUS mg/dL 3.6 3.2         Results from last 7 days   Lab Units 03/13/25  0501 03/12/25  0518 03/11/25  0500 03/10/25  1854   WBC 10*3/mm3 5.75 6.18 6.55 5.80   HEMOGLOBIN g/dL 9.0* 8.9* 9.3* 10.9*   PLATELETS 10*3/mm3 220 209 213 246           Assessment / Plan     ASSESSMENT:  ESRD - HD MWF via RUE AVF.  Off cycle, dialyzed F[facility] - TUES - THURS (today).  Lytes/vol stable.     HTN of ESRD - initial BP's high better now, off antihypertensives (coreg stopped ~ 6 mos ago), improved with HD (SBP 120s to 140s)  S/P fall with generalized weakness -s/p PT/OT eval  DM2, mgmt per primary team  Hx AFIB & CVA on AC (eliquis)   BPH, on flomax   Anemia of CKD, hgb stable, likely on long acting HAI at dialysis    PLAN:  HD today completed   Rehab placement in progress (looking at [facility] with HD on site)    Jose Enrique Montelongo MD  03/13/25  15:09 EDT    [facility]\""  [phone]

## 2025-03-14 LAB
ANION GAP SERPL CALCULATED.3IONS-SCNC: 9 MMOL/L (ref 5–15)
BASOPHILS # BLD AUTO: 0.02 10*3/MM3 (ref 0–0.2)
BASOPHILS NFR BLD AUTO: 0.4 % (ref 0–1.5)
BUN SERPL-MCNC: 35 MG/DL (ref 8–23)
BUN/CREAT SERPL: 12.5 (ref 7–25)
CALCIUM SPEC-SCNC: 8.2 MG/DL (ref 8.6–10.5)
CHLORIDE SERPL-SCNC: 104 MMOL/L (ref 98–107)
CO2 SERPL-SCNC: 26 MMOL/L (ref 22–29)
CREAT SERPL-MCNC: 2.81 MG/DL (ref 0.76–1.27)
DEPRECATED RDW RBC AUTO: 46.5 FL (ref 37–54)
EGFRCR SERPLBLD CKD-EPI 2021: 23.6 ML/MIN/1.73
EOSINOPHIL # BLD AUTO: 0.21 10*3/MM3 (ref 0–0.4)
EOSINOPHIL NFR BLD AUTO: 4.3 % (ref 0.3–6.2)
ERYTHROCYTE [DISTWIDTH] IN BLOOD BY AUTOMATED COUNT: 14.6 % (ref 12.3–15.4)
GLUCOSE BLDC GLUCOMTR-MCNC: 154 MG/DL (ref 70–130)
GLUCOSE BLDC GLUCOMTR-MCNC: 161 MG/DL (ref 70–130)
GLUCOSE BLDC GLUCOMTR-MCNC: 214 MG/DL (ref 70–130)
GLUCOSE BLDC GLUCOMTR-MCNC: 219 MG/DL (ref 70–130)
GLUCOSE BLDC GLUCOMTR-MCNC: 58 MG/DL (ref 70–130)
GLUCOSE BLDC GLUCOMTR-MCNC: 58 MG/DL (ref 70–130)
GLUCOSE SERPL-MCNC: 156 MG/DL (ref 65–99)
HCT VFR BLD AUTO: 28.5 % (ref 37.5–51)
HGB BLD-MCNC: 8.7 G/DL (ref 13–17.7)
IMM GRANULOCYTES # BLD AUTO: 0.02 10*3/MM3 (ref 0–0.05)
IMM GRANULOCYTES NFR BLD AUTO: 0.4 % (ref 0–0.5)
LYMPHOCYTES # BLD AUTO: 0.82 10*3/MM3 (ref 0.7–3.1)
LYMPHOCYTES NFR BLD AUTO: 16.7 % (ref 19.6–45.3)
MAGNESIUM SERPL-MCNC: 1.9 MG/DL (ref 1.6–2.4)
MCH RBC QN AUTO: 26.8 PG (ref 26.6–33)
MCHC RBC AUTO-ENTMCNC: 30.5 G/DL (ref 31.5–35.7)
MCV RBC AUTO: 87.7 FL (ref 79–97)
MONOCYTES # BLD AUTO: 0.54 10*3/MM3 (ref 0.1–0.9)
MONOCYTES NFR BLD AUTO: 11 % (ref 5–12)
NEUTROPHILS NFR BLD AUTO: 3.3 10*3/MM3 (ref 1.7–7)
NEUTROPHILS NFR BLD AUTO: 67.2 % (ref 42.7–76)
NRBC BLD AUTO-RTO: 0 /100 WBC (ref 0–0.2)
PHOSPHATE SERPL-MCNC: 3.2 MG/DL (ref 2.5–4.5)
PLATELET # BLD AUTO: 205 10*3/MM3 (ref 140–450)
PMV BLD AUTO: 10.6 FL (ref 6–12)
POTASSIUM SERPL-SCNC: 4.4 MMOL/L (ref 3.5–5.2)
RBC # BLD AUTO: 3.25 10*6/MM3 (ref 4.14–5.8)
SODIUM SERPL-SCNC: 139 MMOL/L (ref 136–145)
WBC NRBC COR # BLD AUTO: 4.91 10*3/MM3 (ref 3.4–10.8)

## 2025-03-14 PROCEDURE — 97530 THERAPEUTIC ACTIVITIES: CPT

## 2025-03-14 PROCEDURE — 80048 BASIC METABOLIC PNL TOTAL CA: CPT | Performed by: NURSE PRACTITIONER

## 2025-03-14 PROCEDURE — 85025 COMPLETE CBC W/AUTO DIFF WBC: CPT | Performed by: NURSE PRACTITIONER

## 2025-03-14 PROCEDURE — 97110 THERAPEUTIC EXERCISES: CPT

## 2025-03-14 PROCEDURE — G0378 HOSPITAL OBSERVATION PER HR: HCPCS

## 2025-03-14 PROCEDURE — 84100 ASSAY OF PHOSPHORUS: CPT

## 2025-03-14 PROCEDURE — 93005 ELECTROCARDIOGRAM TRACING: CPT | Performed by: NURSE PRACTITIONER

## 2025-03-14 PROCEDURE — 93010 ELECTROCARDIOGRAM REPORT: CPT | Performed by: INTERNAL MEDICINE

## 2025-03-14 PROCEDURE — 83735 ASSAY OF MAGNESIUM: CPT

## 2025-03-14 PROCEDURE — 63710000001 INSULIN LISPRO (HUMAN) PER 5 UNITS: Performed by: NURSE PRACTITIONER

## 2025-03-14 PROCEDURE — 82948 REAGENT STRIP/BLOOD GLUCOSE: CPT

## 2025-03-14 RX ORDER — ALBUMIN (HUMAN) 12.5 G/50ML
12.5 SOLUTION INTRAVENOUS AS NEEDED
Status: CANCELLED | OUTPATIENT
Start: 2025-03-15 | End: 2025-03-15

## 2025-03-14 RX ADMIN — INSULIN LISPRO 3 UNITS: 100 INJECTION, SOLUTION INTRAVENOUS; SUBCUTANEOUS at 18:23

## 2025-03-14 RX ADMIN — DEXTROSE MONOHYDRATE 25 G: 25 INJECTION, SOLUTION INTRAVENOUS at 21:07

## 2025-03-14 RX ADMIN — INSULIN LISPRO 3 UNITS: 100 INJECTION, SOLUTION INTRAVENOUS; SUBCUTANEOUS at 11:23

## 2025-03-14 RX ADMIN — TAMSULOSIN HYDROCHLORIDE 0.4 MG: 0.4 CAPSULE ORAL at 08:26

## 2025-03-14 RX ADMIN — ATORVASTATIN CALCIUM 80 MG: 80 TABLET, FILM COATED ORAL at 21:07

## 2025-03-14 RX ADMIN — Medication 10 ML: at 08:28

## 2025-03-14 RX ADMIN — APIXABAN 2.5 MG: 2.5 TABLET, FILM COATED ORAL at 21:07

## 2025-03-14 RX ADMIN — INSULIN LISPRO 2 UNITS: 100 INJECTION, SOLUTION INTRAVENOUS; SUBCUTANEOUS at 08:25

## 2025-03-14 RX ADMIN — LEVOTHYROXINE SODIUM 75 MCG: 0.07 TABLET ORAL at 06:17

## 2025-03-14 RX ADMIN — ASPIRIN 325 MG: 325 TABLET, COATED ORAL at 08:26

## 2025-03-14 RX ADMIN — APIXABAN 2.5 MG: 2.5 TABLET, FILM COATED ORAL at 08:26

## 2025-03-14 RX ADMIN — PANTOPRAZOLE SODIUM 40 MG: 40 TABLET, DELAYED RELEASE ORAL at 18:23

## 2025-03-14 RX ADMIN — PANTOPRAZOLE SODIUM 40 MG: 40 TABLET, DELAYED RELEASE ORAL at 08:26

## 2025-03-14 NOTE — CASE MANAGEMENT/SOCIAL WORK
Post-Acute Authorization Submission      Post Acute Pre-Cert Documentation  Request Submitted by Facility - Type:: Hospital  Post-Acute Authorization Type Submitted:: SNF  Date Post Acute Pre-Cert Inititated per Facility: 03/14/25  Accepting Facility: [facility] Discharge Date Requested: 03/14/25  All Clinicals Submitted?: Yes  Had Accepting Facility at Time of Submission: Yes  Response Communicated to:: , Accepting Facility Liaison  Authorization Number:: PENDING [id number]              JAIME Archer

## 2025-03-14 NOTE — THERAPY TREATMENT NOTE
Acute Care - Physical Therapy Treatment Note  [facility]     Patient Name: Carlito Mo  : 1955  MRN: [id number]  Today's Date: 3/14/2025      Visit Dx:     ICD-10-CM ICD-9-CM   1. Generalized weakness  R53.1 780.79   2. ESRD on dialysis  N18.6 585.6    Z99.2 V45.11   3. Fall, initial encounter  W19.XXXA E888.9     Patient Active Problem List   Diagnosis    Major depressive disorder with single episode, in partial remission    Other hyperlipidemia    Type 2 diabetes mellitus, with long-term current use of insulin    Vitamin D deficiency    Erectile dysfunction    Chronic fatigue    Type 2 diabetes mellitus with ophthalmic complication    Benign prostatic hyperplasia with nocturia    History of basal cell carcinoma    Acquired hypothyroidism    Recurrent strokes    Suspected sleep apnea    YAW (obstructive sleep apnea)    Coronary artery disease involving native coronary artery of native heart without angina pectoris    Chronically elevated troponin    Colon cancer screening    Enlarged lymph nodes    Functional gait abnormality    History of myocardial infarction    Insomnia    Iron deficiency anemia    Major depression, recurrent    Essential hypertension    Acute on chronic renal insufficiency    Falls frequently    Acute on chronic anemia    Neurogenic orthostatic hypotension    Anemia, chronic disease    History of colon polyps    Helicobacter pylori gastritis    Esophagitis    Embolic stroke    Patent foramen ovale    Cardiomyopathy    Diarrhea    Generalized weakness    Paroxysmal atrial fibrillation    Chronic anticoagulation    Acute ischemic stroke    History of stroke    Iron deficiency anemia    Acute HFrEF (heart failure with reduced ejection fraction)    ESRD (end stage renal disease)    Hemoptysis    Chronic HFrEF (heart failure with reduced ejection fraction)    Hemodialysis patient    Exertional dyspnea    Severe malnutrition    Ataxia    Cerebellar stroke, acute    Medically noncompliant     Vertebral artery stenosis, bilateral    Carotid stenosis, right     Past Medical History:   Diagnosis Date    Acquired hypothyroidism     Acute sinusitis     SYLVAIN (acute kidney injury)     on CKD    Anemia     Arthritis     Atrial fibrillation     CAD (coronary artery disease)     Chronic combined systolic and diastolic congestive heart failure     COPD (chronic obstructive pulmonary disease)     Depression     Diabetic neuropathy 04/11/2022    Diabetes mellitus due to underlying condition    Esophagitis 06/10/2020    Added automatically from request for surgery [id number]      ESRD (end stage renal disease) 12/01/2023    Frequent PVCs     Generalized weakness     GERD (gastroesophageal reflux disease)     Helicobacter pylori gastritis 06/04/2020    Hyperlipidemia     Hypertension     Hypertensive encephalopathy     Pleural effusion     Pneumonia     Poor historian     RBBB (right bundle branch block)     Stroke     POOR VISION    TIA (transient ischemic attack)     Type 2 diabetes mellitus     Urinary retention     Vitamin D deficiency      Past Surgical History:   Procedure Laterality Date    APPENDECTOMY N/A 02/14/2016    Dr. Alexey Dodson    ARTERIOVENOUS FISTULA/SHUNT SURGERY Right 06/03/2022    Procedure: RIGHT FOREARM ARTERIOVENOUS FISTULA PLACEMENT RADIOCEPHALIC WITH CEPHALIC VEIN TRANSPOSITION;  Surgeon: Eliseo Willis MD;  Location: [facility] MAIN OR;  Service: Vascular;  Laterality: Right;    COLONOSCOPY      over 20 years ago.  no polyps     COLONOSCOPY N/A 09/18/2018    Procedure: COLONOSCOPY;  Surgeon: Jose Enrique Louie MD;  Location: [facility] ENDOSCOPY;  Service: Gastroenterology    COLONOSCOPY N/A 09/19/2018    IH, EH, polyps (TAs w/low grade dysplasia)    COLONOSCOPY N/A 06/01/2020    Procedure: COLONOSCOPY;  Surgeon: Jose Enrique Louie MD;  Location: [facility] ENDOSCOPY;  Service: Gastroenterology;  Laterality: N/A;  Pre op-Anemia, Melena, History of Polyps  Post op-To Cecum/TI, Polyp, Poor Prep     CORONARY ARTERY BYPASS GRAFT  11/2001    ENDOSCOPY N/A 05/29/2020    Procedure: ESOPHAGOGASTRODUODENOSCOPY with biopsies;  Surgeon: Amanda Lovelace MD;  Location: [facility] ENDOSCOPY;  Service: Gastroenterology;  Laterality: N/A;  pre-anemia, dark stools  post-esophagitis, hiatal hernia    ENDOSCOPY N/A 09/15/2020    Procedure: ESOPHAGOGASTRODUODENOSCOPY WITH BIOPSIES;  Surgeon: Jose Enrique Louie MD;  Location: [facility] ENDOSCOPY;  Service: Gastroenterology;  Laterality: N/A;  pre: history of melena and esophagitis  post: mild esophagitis and gastritis, small hiatal hernia    ENDOSCOPY N/A 12/4/2023    Procedure: ESOPHAGOGASTRODUODENOSCOPY with bx;  Surgeon: Quoc Elmore MD;  Location: [facility] ENDOSCOPY;  Service: Gastroenterology;  Laterality: N/A;  pre: Coffee-ground emesis  post: hiatal hernia, esophagitis    HERNIA REPAIR Left 12/05/2019    THORACENTESIS      TONSILLECTOMY      VASECTOMY       PT Assessment (Last 12 Hours)       PT Evaluation and Treatment       Row Name 03/14/25 0900          Physical Therapy Time and Intention    Subjective Information complains of  -     Document Type therapy note (daily note)  -     Mode of Treatment individual therapy;physical therapy  -     Patient Effort good  -     Symptoms Noted During/After Treatment fatigue  -       Row Name 03/14/25 0900          General Information    Patient Profile Reviewed yes  -     Existing Precautions/Restrictions fall  -     Barriers to Rehab medically complex  -       Row Name 03/14/25 0900          Living Environment    Current Living Arrangements home  -       Row Name 03/14/25 0900          Pain    Pretreatment Pain Rating 0/10 - no pain  -     Posttreatment Pain Rating 0/10 - no pain  -       Row Name 03/14/25 0900          Cognition    Orientation Status (Cognition) oriented x 3  -       Row Name 03/14/25 0900          Bed Mobility    Supine-Sit [place] (Bed Mobility) standby assist  -      Assistive Device (Bed Mobility) bed rails;head of bed elevated  -     Comment, (Bed Mobility) increased time required to complete task  -       Row Name 03/14/25 0900          Transfers    Transfers toilet transfer  -       Row Name 03/14/25 0900          Sit-Stand Transfer    Sit-Stand [place] (Transfers) contact guard  -     Assistive Device (Sit-Stand Transfers) walker, front-wheeled  -       Row Name 03/14/25 0900          Stand-Sit Transfer    Stand-Sit [place] (Transfers) contact guard  -     Assistive Device (Stand-Sit Transfers) walker, front-wheeled  -       Row Name 03/14/25 0900          Toilet Transfer    Type (Toilet Transfer) sit-stand;stand-sit;stand pivot/stand step  -     [place] Level (Toilet Transfer) contact guard  -     Assistive Device (Toilet Transfer) commode, bedside with drop arms;walker, front-wheeled  -       Row Name 03/14/25 0900          Gait/Stairs (Locomotion)    [place] Level (Gait) contact guard  -     Assistive Device (Gait) walker, front-wheeled  -     Distance in Feet (Gait) 20  -LH     Pattern (Gait) step-to;step-through  -     Deviations/Abnormal Patterns (Gait) bilateral deviations;eli decreased;stride length decreased  -     Bilateral Gait Deviations forward flexed posture;heel strike decreased  -     Comment, (Gait/Stairs) ambulated to [facility] and around bed to chair  -       Row Name 03/14/25 0900          Balance    Static Sitting Balance independent  -     Position, Sitting Balance unsupported;sitting edge of bed;sitting in chair  -     Static Standing Balance contact guard  -     Position/Device Used, Standing Balance supported;walker, front-wheeled  -       Row Name 03/14/25 0900          Plan of Care Review    Plan of Care Reviewed With patient  -     Outcome Evaluation Pt agreeable to skilled PT,reports generalized weakness/fatigue. Pt assisted to [facility] CGA, rwx used pt had BM. Pt ambulated short distance  20ft CGA rwx forward posturing. generalized weakness and fatigue limiting. continue to recommend SNU at [place] to optimize independence and safety w functional mobility.  -       Row Name 03/14/25 0900          Positioning and Restraints    Pre-Treatment Position in bed  -     Post Treatment Position chair  -     In Chair reclined;call light within reach;encouraged to call for assist;exit alarm on  -               User Key  (r) = Recorded By, (t) = Taken By, (c) = Cosigned By      Initials Name Provider Type     Alisia Luna, PT Physical Therapist                    Physical Therapy Education       Title: PT OT SLP Therapies (In Progress)       Topic: Physical Therapy (In Progress)       Point: Mobility training (In Progress)       Learning Progress Summary            Patient Acceptance, E, NR by  at 3/14/2025 0932    Acceptance, E,TB, NR by MB at 3/14/2025 0657    Acceptance, E, NR by  at 3/12/2025 1400                      Point: Home exercise program (In Progress)       Learning Progress Summary            Patient Acceptance, E, NR by  at 3/14/2025 0932    Acceptance, E,TB, NR by MB at 3/14/2025 0657    Acceptance, E, NR by  at 3/12/2025 1400                      Point: Body mechanics (In Progress)       Learning Progress Summary            Patient Acceptance, E, NR by  at 3/14/2025 0932    Acceptance, E,TB, NR by MB at 3/14/2025 0657                      Point: Precautions (In Progress)       Learning Progress Summary            Patient Acceptance, E, NR by  at 3/14/2025 0932    Acceptance, E,TB, NR by MB at 3/14/2025 0657                                      User Key       Initials Effective Dates Name Provider Type [facility] 06/16/21 -  Alisia Luna, PT Physical Therapist PT    MB 07/24/24 -  Jose Enrique Marino LPN Licensed Nurse Nurse                  PT Recommendation and Plan  Anticipated Discharge Disposition (PT): skilled nursing facility  Planned Therapy Interventions (PT): balance  training, bed mobility training, gait training, home exercise program, ROM (range of motion), stair training, strengthening, stretching, transfer training  Therapy Frequency (PT): 5 times/wk  Plan of Care Reviewed With: patient  Outcome Evaluation: Pt agreeable to skilled PT,reports generalized weakness/fatigue. Pt assisted to [facility] CGA, rwx used pt had BM. Pt ambulated short distance 20ft CGA rwx forward posturing. generalized weakness and fatigue limiting. continue to recommend SNU at [place] to optimize independence and safety w functional mobility.   Outcome Measures       Row Name 03/14/25 0900 03/12/25 1400          How much help from another person do you currently need...    Turning from your back to your side while in flat bed without using bedrails? 3  -LH 3  -LH     Moving from lying on back to sitting on the side of a flat bed without bedrails? 3  - 3  -LH     Moving to and from a bed to a chair (including a wheelchair)? 3  - 3  -LH     Standing up from a chair using your arms (e.g., wheelchair, bedside chair)? 3  - 2  -LH     Climbing 3-5 steps with a railing? 2  - 2  -LH     To walk in hospital room? 2  - 2  -LH     AM-PAC 6 Clicks Score (PT) 16  - 15  -        Functional Assessment    Outcome Measure Options AM-PAC 6 Clicks Basic Mobility (PT)  - AM-PAC 6 Clicks Basic Mobility (PT)  -               User Key  (r) = Recorded By, (t) = Taken By, (c) = Cosigned By      Initials Name Provider Type     Alisia Luna, PT Physical Therapist                     Time Calculation:    PT Charges       Row Name 03/14/25 0932             Time Calculation    Start Time 0840  -      Stop Time 0905  -      Time Calculation (min) 25 min  -      PT Received On 03/14/25  -      PT - Next Appointment 03/17/25  -                User Key  (r) = Recorded By, (t) = Taken By, (c) = Cosigned By      Initials Name Provider Type     Alisia Luna, PT Physical Therapist                  Therapy Charges for  Today       Code Description Service Date Service Provider Modifiers Qty    62637640109  PT THER PROC EA 15 MIN 3/14/2025 Alisia Luna, PT GP 1    [id number]  PT THERAPEUTIC ACT EA 15 MIN 3/14/2025 Alisia Luna, PT GP 1            PT G-Codes  Outcome Measure Options: AM-PAC 6 Clicks Basic Mobility (PT)  AM-PAC 6 Clicks Score (PT): 16  AM-PAC 6 Clicks Score (OT): 13  Modified [place] Scale: 4 - Moderately severe disability.  Unable to walk without assistance, and unable to attend to own bodily needs without assistance.    Alisia Luna, PT  3/14/2025

## 2025-03-14 NOTE — PROGRESS NOTES
[facility] Progress Note      Patient Name: Carlito Mo  : 1955  MRN: [id number]  Primary Care Physician:  Belle Simons APRN  Date of admission: 3/10/2025    Subjective     Interval History:   F/u ESRD     Review of Systems:   Denies dyspnea or nausea     Objective     Vitals:   Temp:  [97.7 °F (36.5 °C)-98.8 °F (37.1 °C)] 97.7 °F (36.5 °C)  Heart Rate:  [67-97] 81  Resp:  [16-18] 16  BP: (109-145)/(52-66) 122/59    Intake/Output Summary (Last 24 hours) at 3/14/2025 1444  Last data filed at 3/14/2025 0800  Gross per 24 hour   Intake 720 ml   Output 100 ml   Net 620 ml       Physical Exam:    General Appearance: frail WM no distress alert  Neck: supple, no JVD  Lungs: CTA bilat no rales   Heart: RRR, normal S1 and S2  Abdomen: soft, nontender, nondistended  Extremities: no edema, cyanosis or clubbing       Scheduled Meds:     apixaban, 2.5 mg, Oral, BID  aspirin, 325 mg, Oral, Daily  atorvastatin, 80 mg, Oral, Nightly  insulin lispro, 2-7 Units, Subcutaneous, 4x Daily AC & at Bedtime  levothyroxine, 75 mcg, Oral, Q AM  pantoprazole, 40 mg, Oral, BID AC  sodium chloride, 10 mL, Intravenous, Q12H  tamsulosin, 0.4 mg, Oral, Daily      IV Meds:        Results Reviewed:   I have personally reviewed the results from the time of this admission to 3/14/2025 14:44 EDT     Results from last 7 days   Lab Units 25  0414 25  0501 25  0518 25  0500 03/10/25  1854   SODIUM mmol/L 139 137 137   < > 133*   POTASSIUM mmol/L 4.4 5.1 4.7   < > 5.2   CHLORIDE mmol/L 104 102 104   < > 96*   CO2 mmol/L 26.0 23.4 24.0   < > 29.3*   BUN mg/dL 35* 48* 37*   < > 48*   CREATININE mg/dL 2.81* 3.67* 2.82*   < > 3.78*   CALCIUM mg/dL 8.2* 8.5* 8.5*   < > 9.4   BILIRUBIN mg/dL  --   --   --   --  0.4   ALK PHOS U/L  --   --   --   --  104   ALT (SGPT) U/L  --   --   --   --  16   AST (SGOT) U/L  --   --   --   --  17   GLUCOSE mg/dL 156* 113* 96   < > 332*    < > = values in this  interval not displayed.     Estimated Creatinine Clearance: 23.2 mL/min (A) (by C-G formula based on SCr of 2.81 mg/dL (H)).  Results from last 7 days   Lab Units 03/14/25  0414 03/13/25  0501 03/12/25  0518   MAGNESIUM mg/dL 1.9  --   --    PHOSPHORUS mg/dL 3.2 3.6 3.2         Results from last 7 days   Lab Units 03/14/25  0414 03/13/25  0501 03/12/25  0518 03/11/25  0500 03/10/25  1854   WBC 10*3/mm3 4.91 5.75 6.18 6.55 5.80   HEMOGLOBIN g/dL 8.7* 9.0* 8.9* 9.3* 10.9*   PLATELETS 10*3/mm3 205 220 209 213 246           Assessment / Plan     ASSESSMENT:  ESRD - HD MWF via [facility].  Off cycle, dialyzed FRI - TUES - THURS (today).  Lytes/vol stable.     HTN of ESRD - initial BP's high better now, off antihypertensives (coreg stopped ~ 6 mos ago), improved with HD (SBP 120s to 140s)  S/P fall with generalized weakness -s/p PT/OT eval  DM2, mgmt per primary team  Hx AFIB & CVA on AC (eliquis)   BPH, on flomax   Anemia of CKD, hgb stable, likely on long acting HAI at dialysis    PLAN:  HD tomorrow [facility] where he will dialyze next on TUES         Jose Enrique Montelongo MD  03/14/25  14:44 EDT    [facility]  [phone]

## 2025-03-14 NOTE — PLAN OF CARE
Goal Outcome Evaluation:      Pt rested well this shift. Still waiting for rehab placement, then he will discharge. All VSS. Will CTM.

## 2025-03-14 NOTE — PROGRESS NOTES
Name: Carlito Mo ADMIT: 3/10/2025   : 1955  PCP: Belle Simons APRN    MRN: [id number] LOS: 0 days   AGE/SEX: 69 y.o. male  ROOM: [facility]     Subjective   Subjective   Mr. Mo is awake, alert. He reports he is at his baseline. He is currently sitting up in chair, he just finished up his lunch   Objective   Objective   Vital Signs  Temp:  [97.7 °F (36.5 °C)-98.8 °F (37.1 °C)] 97.7 °F (36.5 °C)  Heart Rate:  [67-97] 81  Resp:  [16-18] 16  BP: (109-145)/(52-66) 122/59  SpO2:  [97 %-98 %] 98 %  on   ;   Device (Oxygen Therapy): room air  Body mass index is 19.78 kg/m².  Physical Exam  Vitals and nursing note reviewed.   Constitutional:       Appearance: He is ill-appearing.   HENT:      Head: Atraumatic.      Mouth/Throat:      Mouth: Mucous membranes are dry.   Eyes:      Conjunctiva/sclera: Conjunctivae normal.   Cardiovascular:      Rate and Rhythm: Normal rate. Rhythm irregular.      Pulses: Normal pulses.           Radial pulses are 2+ on the right side and 2+ on the left side.      Heart sounds: Normal heart sounds.   Pulmonary:      Effort: Pulmonary effort is normal.   Abdominal:      General: Bowel sounds are normal.      Palpations: Abdomen is soft.   Musculoskeletal:      Right lower leg: Edema present.   Skin:     General: Skin is warm and dry.      Capillary Refill: Capillary refill takes less than 2 seconds.      Coloration: Skin is pale.   Neurological:      General: No focal deficit present.      Mental Status: He is alert. Mental status is at baseline. He is confused.   Psychiatric:         Mood and Affect: Mood normal.       Results Review     I reviewed the patient's new clinical results.  Results from last 7 days   Lab Units 25  0501 25  0518 25  0500   WBC 10*3/mm3 4.91 5.75 6.18 6.55   HEMOGLOBIN g/dL 8.7* 9.0* 8.9* 9.3*   PLATELETS 10*3/mm3 205 220 209 213     Results from last 7 days   Lab Units 25  0414 25  0501 25  0518  03/11/25  0500   SODIUM mmol/L 139 137 137 137   POTASSIUM mmol/L 4.4 5.1 4.7 5.0   CHLORIDE mmol/L 104 102 104 100   CO2 mmol/L 26.0 23.4 24.0 27.6   BUN mg/dL 35* 48* 37* 48*   CREATININE mg/dL 2.81* 3.67* 2.82* 3.30*   GLUCOSE mg/dL 156* 113* 96 183*   EGFR mL/min/1.73 23.6* 17.1* 23.5* 19.4*     Results from last 7 days   Lab Units 03/12/25  0518 03/10/25  1854   ALBUMIN g/dL 3.3* 3.5   BILIRUBIN mg/dL  --  0.4   ALK PHOS U/L  --  104   AST (SGOT) U/L  --  17   ALT (SGPT) U/L  --  16     Results from last 7 days   Lab Units 03/14/25  0414 03/13/25  0501 03/12/25  0518 03/11/25  0500 03/10/25  1854   CALCIUM mg/dL 8.2* 8.5* 8.5* 8.6 9.4   ALBUMIN g/dL  --   --  3.3*  --  3.5   MAGNESIUM mg/dL 1.9  --   --   --   --    PHOSPHORUS mg/dL 3.2 3.6 3.2  --   --        Glucose   Date/Time Value Ref Range Status   03/14/2025 1106 219 (H) 70 - 130 mg/dL Final   03/14/2025 0607 154 (H) 70 - 130 mg/dL Final   03/13/2025 2053 212 (H) 70 - 130 mg/dL Final   03/13/2025 1507 222 (H) 70 - 130 mg/dL Final   03/13/2025 1256 126 70 - 130 mg/dL Final   03/13/2025 0736 122 70 - 130 mg/dL Final   03/12/2025 2108 97 70 - 130 mg/dL Final       No radiology results for the last day      I have personally reviewed all medications:  Scheduled Medications  apixaban, 2.5 mg, Oral, BID  aspirin, 325 mg, Oral, Daily  atorvastatin, 80 mg, Oral, Nightly  insulin lispro, 2-7 Units, Subcutaneous, 4x Daily AC & at Bedtime  levothyroxine, 75 mcg, Oral, Q AM  pantoprazole, 40 mg, Oral, BID AC  sodium chloride, 10 mL, Intravenous, Q12H  tamsulosin, 0.4 mg, Oral, Daily    Infusions   Diet  Diet: Diabetic; Consistent Carbohydrate; Texture: Regular (IDDSI 7); Fluid Consistency: Thin (IDDSI 0)    I have personally reviewed:  [x]  Laboratory   [x]  Microbiology   [x]  Radiology   [x]  EKG/Telemetry  [x]  Cardiology/Vascular   []  Pathology    []  Records       Assessment/Plan     Active Hospital Problems    Diagnosis  POA   • **Generalized weakness [R53.1]   Yes   • Chronic HFrEF (heart failure with reduced ejection fraction) [I50.22]  Yes   • ESRD (end stage renal disease) [N18.6]  Yes   • History of stroke [Z86.73]  Not Applicable   • Paroxysmal atrial fibrillation [I48.0]  Yes   • Embolic stroke [I63.9]  Yes   • Essential hypertension [I10]  Yes   • Coronary artery disease involving native coronary artery of native heart without angina pectoris [I25.10]  Yes   • Acquired hypothyroidism [E03.9]  Yes   • Type 2 diabetes mellitus, with long-term current use of insulin [E11.9, Z79.4]  Not Applicable      Resolved Hospital Problems   No resolved problems to display.       69 y.o. male admitted with Generalized weakness.    Generalized weakness  Fall with right hip pain at home  Initial x-ray at time of admission was unremarkable for any acute fracture  He continues to deny any acute pain   PT OT following recommend SNF/acute rehab at discharge - He is stable for discharge pending placement   CCP following and coordinating  awaiting placement     ESRD  Hemodialysis dependent-last HD on 3/13   Normal sessions Monday, Wednesday, Friday  Nephrology following  Will continue to monitor appreciate nephrology's assistance and recommendations.  Monitor electrolytes and replace as needed    Anemia of Chronic disease   HBG  8.7 today   No active bleeding   Will continue to monitor and use for symptomatic anemia, hemoglobin less than 7.      Coronary artery disease  History of CVA-memory deficits noted  Elevated troponin levels  He remains  free of chest pain  Initial elevation of his troponin suspected to be secondary to CKD  Continue ASA, statin    Proximal atrial fibrillation  No pharmacological agent for rate control currently  On chronic anticoagulation with Eliquis    HFrEF  Chronic  Most recent echocardiogram LVEF 47.7%  proBNP is elevated, at his baseline and stable.  He appears euvolemic  Continue to monitor I's and O's.  Daily weights    Hypothyroidism  Continue home  levothyroxine    BPH  Continue home tamsulosin    Type II DM   Continue Accu-Cheks ACHS  He continues to be hyperglycemic-intermittently  Not on  home insulin, or oral antidiabetics  Continue SSI for hyperglycemia  Continue glycemia treatment per protocol  A1c 7.10  Changed diet to consistent carb diet from regular            Eliquis (home med) for DVT prophylaxis.  Full code.  Discussed with patient, nursing staff, and care team on multidisciplinary rounds.  Anticipate discharge home later this week.  Expected Discharge Date: 3/14/2025; Expected Discharge Time:       JI Hurtado  [facility]  03/14/25  13:40 EDT

## 2025-03-14 NOTE — CASE MANAGEMENT/SOCIAL WORK
Continued Stay Note  [facility]     Patient Name: Carlito Mo  MRN: [id number]  Today's Date: 3/14/2025    Admit Date: 3/10/2025    Plan: [facility] PENDING precert   Discharge Plan       Row Name 03/14/25 1341       Plan    Plan [facility] PENDING precert    Plan Comments S/W Cate/ Noni who states pt is accepted at [facility] pending precert.  HD has been arranged and can be started on Tues 3/18/25.  Pt and his wife Lissette notified and are in agreement.  Pt will need HD here at [facility] [place] by [facility] before DC in order to be able to wait till Tues when he can received HD at [facility].  Precert initiated - await determination.........Heike GONZALES/ GISELLE                   Discharge Codes    No documentation.                 Expected Discharge Date and Time       Expected Discharge Date Expected Discharge Time    Mar 14, 2025               Heike Sellers RN

## 2025-03-14 NOTE — PLAN OF CARE
Goal Outcome Evaluation:  Plan of Care Reviewed With: patient           Outcome Evaluation: Pt agreeable to skilled PT,reports generalized weakness/fatigue. Pt assisted to [facility] CGA, rwx used pt had BM. Pt ambulated short distance 20ft CGA rwx forward posturing. generalized weakness and fatigue limiting. continue to recommend SNU at [place] to optimize independence and safety w functional mobility.    Anticipated Discharge Disposition (PT): skilled nursing facility

## 2025-03-15 LAB
ANION GAP SERPL CALCULATED.3IONS-SCNC: 10 MMOL/L (ref 5–15)
BASOPHILS # BLD AUTO: 0.01 10*3/MM3 (ref 0–0.2)
BASOPHILS NFR BLD AUTO: 0.2 % (ref 0–1.5)
BUN SERPL-MCNC: 50 MG/DL (ref 8–23)
BUN/CREAT SERPL: 14 (ref 7–25)
CALCIUM SPEC-SCNC: 8.2 MG/DL (ref 8.6–10.5)
CHLORIDE SERPL-SCNC: 99 MMOL/L (ref 98–107)
CO2 SERPL-SCNC: 25 MMOL/L (ref 22–29)
CREAT SERPL-MCNC: 3.56 MG/DL (ref 0.76–1.27)
DEPRECATED RDW RBC AUTO: 45.6 FL (ref 37–54)
EGFRCR SERPLBLD CKD-EPI 2021: 17.8 ML/MIN/1.73
EOSINOPHIL # BLD AUTO: 0.2 10*3/MM3 (ref 0–0.4)
EOSINOPHIL NFR BLD AUTO: 4.2 % (ref 0.3–6.2)
ERYTHROCYTE [DISTWIDTH] IN BLOOD BY AUTOMATED COUNT: 14.6 % (ref 12.3–15.4)
GLUCOSE BLDC GLUCOMTR-MCNC: 128 MG/DL (ref 70–130)
GLUCOSE BLDC GLUCOMTR-MCNC: 148 MG/DL (ref 70–130)
GLUCOSE BLDC GLUCOMTR-MCNC: 220 MG/DL (ref 70–130)
GLUCOSE BLDC GLUCOMTR-MCNC: 85 MG/DL (ref 70–130)
GLUCOSE SERPL-MCNC: 115 MG/DL (ref 65–99)
HCT VFR BLD AUTO: 26.5 % (ref 37.5–51)
HGB BLD-MCNC: 8.5 G/DL (ref 13–17.7)
IMM GRANULOCYTES # BLD AUTO: 0.03 10*3/MM3 (ref 0–0.05)
IMM GRANULOCYTES NFR BLD AUTO: 0.6 % (ref 0–0.5)
LYMPHOCYTES # BLD AUTO: 0.95 10*3/MM3 (ref 0.7–3.1)
LYMPHOCYTES NFR BLD AUTO: 20 % (ref 19.6–45.3)
MCH RBC QN AUTO: 27.5 PG (ref 26.6–33)
MCHC RBC AUTO-ENTMCNC: 32.1 G/DL (ref 31.5–35.7)
MCV RBC AUTO: 85.8 FL (ref 79–97)
MONOCYTES # BLD AUTO: 0.57 10*3/MM3 (ref 0.1–0.9)
MONOCYTES NFR BLD AUTO: 12 % (ref 5–12)
NEUTROPHILS NFR BLD AUTO: 3 10*3/MM3 (ref 1.7–7)
NEUTROPHILS NFR BLD AUTO: 63 % (ref 42.7–76)
NRBC BLD AUTO-RTO: 0 /100 WBC (ref 0–0.2)
PLATELET # BLD AUTO: 213 10*3/MM3 (ref 140–450)
PMV BLD AUTO: 10.6 FL (ref 6–12)
POTASSIUM SERPL-SCNC: 4.7 MMOL/L (ref 3.5–5.2)
RBC # BLD AUTO: 3.09 10*6/MM3 (ref 4.14–5.8)
SODIUM SERPL-SCNC: 134 MMOL/L (ref 136–145)
WBC NRBC COR # BLD AUTO: 4.76 10*3/MM3 (ref 3.4–10.8)

## 2025-03-15 PROCEDURE — 82948 REAGENT STRIP/BLOOD GLUCOSE: CPT

## 2025-03-15 PROCEDURE — 80048 BASIC METABOLIC PNL TOTAL CA: CPT | Performed by: NURSE PRACTITIONER

## 2025-03-15 PROCEDURE — 85025 COMPLETE CBC W/AUTO DIFF WBC: CPT | Performed by: NURSE PRACTITIONER

## 2025-03-15 PROCEDURE — G0378 HOSPITAL OBSERVATION PER HR: HCPCS

## 2025-03-15 PROCEDURE — 63710000001 INSULIN LISPRO (HUMAN) PER 5 UNITS: Performed by: NURSE PRACTITIONER

## 2025-03-15 RX ORDER — ASPIRIN 81 MG/1
81 TABLET ORAL DAILY
Start: 2025-03-15

## 2025-03-15 RX ORDER — INSULIN LISPRO 100 [IU]/ML
2-7 INJECTION, SOLUTION INTRAVENOUS; SUBCUTANEOUS
Start: 2025-03-15

## 2025-03-15 RX ADMIN — INSULIN LISPRO 3 UNITS: 100 INJECTION, SOLUTION INTRAVENOUS; SUBCUTANEOUS at 17:15

## 2025-03-15 RX ADMIN — ATORVASTATIN CALCIUM 80 MG: 80 TABLET, FILM COATED ORAL at 20:40

## 2025-03-15 RX ADMIN — Medication 10 ML: at 08:08

## 2025-03-15 RX ADMIN — APIXABAN 2.5 MG: 2.5 TABLET, FILM COATED ORAL at 08:08

## 2025-03-15 RX ADMIN — LEVOTHYROXINE SODIUM 75 MCG: 0.07 TABLET ORAL at 06:46

## 2025-03-15 RX ADMIN — PANTOPRAZOLE SODIUM 40 MG: 40 TABLET, DELAYED RELEASE ORAL at 17:15

## 2025-03-15 RX ADMIN — TAMSULOSIN HYDROCHLORIDE 0.4 MG: 0.4 CAPSULE ORAL at 08:08

## 2025-03-15 RX ADMIN — APIXABAN 2.5 MG: 2.5 TABLET, FILM COATED ORAL at 20:40

## 2025-03-15 RX ADMIN — ASPIRIN 325 MG: 325 TABLET, COATED ORAL at 08:08

## 2025-03-15 RX ADMIN — PANTOPRAZOLE SODIUM 40 MG: 40 TABLET, DELAYED RELEASE ORAL at 06:45

## 2025-03-15 NOTE — NURSING NOTE
Hd completed as ordered. Stable run. Stable for discharge to NH. Post vss. 139/76 hr 80.  2 liters net uf.

## 2025-03-15 NOTE — PROGRESS NOTES
[facility] Progress Note      Patient Name: Carlito Mo  : 1955  MRN: [id number]  Primary Care Physician:  Belle Simons APRN  Date of admission: 3/10/2025    Subjective     Interval History:   F/u ESRD     Review of Systems:   HD today caleb well, removed 2L  Denies dyspnea    Objective     Vitals:   Temp:  [97.4 °F (36.3 °C)-98.1 °F (36.7 °C)] 97.4 °F (36.3 °C)  Heart Rate:  [37-80] 67  Resp:  [16-18] 16  BP: (123-139)/(53-70) 127/61    Intake/Output Summary (Last 24 hours) at 3/15/2025 1458  Last data filed at 3/15/2025 1100  Gross per 24 hour   Intake --   Output 650 ml   Net -650 ml       Physical Exam:    General Appearance: frail cachectic pleasant WM no distress, speech & mentation slow  Neck: supple, no JVD  Lungs: CTA bilat no rales  Heart: RRR, normal S1 and S2  Abdomen: soft, nontender, nondistended  Extremities: no edema, cyanosis or clubbing      Scheduled Meds:     apixaban, 2.5 mg, Oral, BID  aspirin, 325 mg, Oral, Daily  atorvastatin, 80 mg, Oral, Nightly  insulin lispro, 2-7 Units, Subcutaneous, 4x Daily AC & at Bedtime  levothyroxine, 75 mcg, Oral, Q AM  pantoprazole, 40 mg, Oral, BID AC  sodium chloride, 10 mL, Intravenous, Q12H  tamsulosin, 0.4 mg, Oral, Daily      IV Meds:        Results Reviewed:   I have personally reviewed the results from the time of this admission to 3/15/2025 14:58 EDT     Results from last 7 days   Lab Units 03/15/25  0409 25  0414 25  0501 25  0500 03/10/25  1854   SODIUM mmol/L 134* 139 137   < > 133*   POTASSIUM mmol/L 4.7 4.4 5.1   < > 5.2   CHLORIDE mmol/L 99 104 102   < > 96*   CO2 mmol/L 25.0 26.0 23.4   < > 29.3*   BUN mg/dL 50* 35* 48*   < > 48*   CREATININE mg/dL 3.56* 2.81* 3.67*   < > 3.78*   CALCIUM mg/dL 8.2* 8.2* 8.5*   < > 9.4   BILIRUBIN mg/dL  --   --   --   --  0.4   ALK PHOS U/L  --   --   --   --  104   ALT (SGPT) U/L  --   --   --   --  16   AST (SGOT) U/L  --   --   --   --  17   GLUCOSE  mg/dL 115* 156* 113*   < > 332*    < > = values in this interval not displayed.     Estimated Creatinine Clearance: 18.3 mL/min (A) (by C-G formula based on SCr of 3.56 mg/dL (H)).  Results from last 7 days   Lab Units 03/14/25  0414 03/13/25  0501 03/12/25  0518   MAGNESIUM mg/dL 1.9  --   --    PHOSPHORUS mg/dL 3.2 3.6 3.2         Results from last 7 days   Lab Units 03/15/25  0409 03/14/25  0414 03/13/25  0501 03/12/25  0518 03/11/25  0500   WBC 10*3/mm3 4.76 4.91 5.75 6.18 6.55   HEMOGLOBIN g/dL 8.5* 8.7* 9.0* 8.9* 9.3*   PLATELETS 10*3/mm3 213 205 220 209 213           Assessment / Plan     ASSESSMENT:  ESRD - HD MWF via [facility] - TTS this week (and today) as will be his schedule at rehab     HTN of ESRD - initial BP's high better now, off antihypertensives (coreg stopped ~ 6 mos ago), improved with HD (SBP 120s to 140s)  S/P fall with generalized weakness - going to rehab   DM2, mgmt per primary team  Hx AFIB & CVA on AC (eliquis)   BPH, on flomax   Anemia of CKD, hgb stable, likely on long acting HAI at dialysis    PLAN:  HD today completed  Discharge to [facility], dialysis on site there, next treatment ADELAIDE Montelongo MD  03/15/25  14:58 EDT    [facility]  [phone]

## 2025-03-15 NOTE — DISCHARGE SUMMARY
NAME: Carlito Mo ADMIT: 3/10/2025   : 1955  PCP: Belle Simons APRN    MRN: [id number] LOS: 0 days   AGE/SEX: 69 y.o. male  ROOM: [facility]     Date of Admission:  3/10/2025  Date of Discharge:  3/15/2025    PCP: Belle Simons APRN    CHIEF COMPLAINT  Weakness - Generalized      DISCHARGE DIAGNOSIS  Active Hospital Problems    Diagnosis  POA    **Generalized weakness [R53.1]  Yes    Chronic HFrEF (heart failure with reduced ejection fraction) [I50.22]  Yes    ESRD (end stage renal disease) [N18.6]  Yes    History of stroke [Z86.73]  Not Applicable    Paroxysmal atrial fibrillation [I48.0]  Yes    Embolic stroke [I63.9]  Yes    Essential hypertension [I10]  Yes    Coronary artery disease involving native coronary artery of native heart without angina pectoris [I25.10]  Yes    Acquired hypothyroidism [E03.9]  Yes    Type 2 diabetes mellitus, with long-term current use of insulin [E11.9, Z79.4]  Not Applicable      Resolved Hospital Problems   No resolved problems to display.       SECONDARY DIAGNOSES  Past Medical History:   Diagnosis Date    Acquired hypothyroidism     Acute sinusitis     SYLVAIN (acute kidney injury)     on CKD    Anemia     Arthritis     Atrial fibrillation     CAD (coronary artery disease)     Chronic combined systolic and diastolic congestive heart failure     COPD (chronic obstructive pulmonary disease)     Depression     Diabetic neuropathy 2022    Diabetes mellitus due to underlying condition    Esophagitis 06/10/2020    Added automatically from request for surgery [id number]      ESRD (end stage renal disease) 2023    Frequent PVCs     Generalized weakness     GERD (gastroesophageal reflux disease)     Helicobacter pylori gastritis 2020    Hyperlipidemia     Hypertension     Hypertensive encephalopathy     Pleural effusion     Pneumonia     Poor historian     RBBB (right bundle branch block)     Stroke     POOR VISION    TIA (transient ischemic attack)     Type  2 diabetes mellitus     Urinary retention     Vitamin D deficiency        CONSULTS   Nephrology    HOSPITAL COURSE  Patient is a 69 y.o. male ESRD on dialysis, CAD, Afib and h/o stroke on eliquis who presented with increasing weakness and a fall at home , right hip pain- XR negative - no further complaints of pain. He worked with therapy and would benefit from rehab stay to get stronger.       DIAGNOSTICS     XR Chest 1 View [[id number]] Damian as Reviewed   Order Status: Completed Collected: 03/10/25 2045    Updated: 03/11/25 0546   Narrative:     XR CHEST 1 VW-     CLINICAL HISTORY:  fatigue, SOA     FINDINGS:     The lungs are clear of acute infiltrates. The cardiomediastinal  silhouette is remarkable for atherosclerotic calcifications within the  aortic arch. Sternotomy wires are incidentally noted.      Impression:        No active disease in the chest.           This report was finalized on 3/11/2025 5:41 AM by Dr. Matthieu Ortega M.D  on Workstation: [id number]       XR Hip With or Without Pelvis 2 - 3 View Right [[id number]] Damian as Reviewed   Order Status: Completed Collected: 03/10/25 2031    Updated: 03/10/25 2039   Narrative:     XR HIP W OR WO PELVIS 2-3 VIEW RIGHT-     CLINICAL HISTORY:  Fall, right hip pain     FINDINGS:     Frontal projection of the pelvis and 2 radiographic views of the right  hip demonstrate no evidence for acute fracture or bony malalignment.           3/15/2025 0409 03/15/2025 0558 Basic Metabolic Panel [[id number]]    (Abnormal)   Blood    Final result Component Value Units   Glucose 115 High  mg/dL   BUN 50 High  mg/dL   Creatinine 3.56 High  mg/dL   Sodium 134 Low  mmol/L   Potassium 4.7 mmol/L   Chloride 99 mmol/L   CO2 25.0 mmol/L   Calcium 8.2 Low  mg/dL   BUN/Creatinine Ratio 14.0    Anion Gap 10.0 mmol/L   eGFR 17.8 Low  mL/min/1.73          03/15/2025 0409 03/15/2025 0536 CBC Auto Differential [[id number]]   (Abnormal)   Blood    Final result Component Value Units   WBC 4.76  "10*3/mm3   RBC 3.09 Low  10*6/mm3   Hemoglobin 8.5 Low  g/dL   Hematocrit 26.5 Low  %   MCV 85.8 fL   MCH 27.5 pg   MCHC 32.1 g/dL   RDW 14.6 %   RDW-SD 45.6 fl   MPV 10.6 fL   Platelets 213 10*3/mm3   Neutrophil % 63.0 %   Lymphocyte % 20.0 %   Monocyte % 12.0 %   Eosinophil % 4.2 %   Basophil % 0.2 %   Immature Grans % 0.6 High  %   Neutrophils, Absolute 3.00 10*3/mm3        03/12/2025 1747 03/12/2025 2000 Hepatitis B Surface Antibody [[id number]]    Blood    Final result Component Value   Hep B S Ab Reactive          03/12/2025 1747 03/12/2025 1842 Hepatitis B Surface Antigen [[id number]]   Blood    Final result Component Value   Hepatitis B Surface Ag Non-Reactive          03/12/2025 1747 03/13/2025 0813 Hepatitis B Core Antibody, Total [[id number]]    Blood    Final result Component Value   Hep B Core Total Ab Negative               03/11/2025 0500 03/11/2025 0553 TSH [[id number]]   Blood from Arm, Left    Final result Component Value Units   TSH 2.810 uIU/mL          03/10/2025 2323 03/10/2025 2352 Hemoglobin A1c [[id number]]    (Abnormal)   Blood from Arm, Left    Final result Component Value Units   Hemoglobin A1C 7.10 High  %                 PHYSICAL EXAM  Objective:  Vital signs: (most recent): Blood pressure 137/66, pulse 65, temperature 98.1 °F (36.7 °C), temperature source Oral, resp. rate 16, height 182.9 cm (72.01\"), weight 66.2 kg (145 lb 14.4 oz), SpO2 100%.                  Chronically ill  On HD during exam and resting  No resp distress, lungs clear    CONDITION ON DISCHARGE  Stable.      DISCHARGE DISPOSITION   Skilled Nursing Facility (DC - External)      DISCHARGE MEDICATIONS       Your medication list        START taking these medications        Instructions Last Dose Given Next Dose Due   aspirin 81 MG EC tablet  Replaces: aspirin 325 MG tablet      Take 1 tablet by mouth Daily.       atorvastatin 80 MG tablet  Commonly known as: LIPITOR      Take 1 tablet by mouth Every Night.       insulin " lispro 100 UNIT/ML injection  Commonly known as: HUMALOG/ADMELOG      Inject 2-7 Units under the skin into the appropriate area as directed 4 (Four) Times a Day Before Meals & at Bedtime.              CONTINUE taking these medications        Instructions Last Dose Given Next Dose Due   Eliquis 2.5 MG tablet tablet  Generic drug: apixaban      TAKE ONE TABLET BY MOUTH TWICE DAILY       levothyroxine 75 MCG tablet  Commonly known as: SYNTHROID, LEVOTHROID      Take 1 tablet by mouth Every Morning.       pantoprazole 40 MG EC tablet  Commonly known as: PROTONIX      Take 1 tablet by mouth 2 (Two) Times a Day Before Meals.       tamsulosin 0.4 MG capsule 24 hr capsule  Commonly known as: FLOMAX      Take 1 capsule by mouth Daily.              STOP taking these medications      Anti-Diarrheal 2 MG tablet  Generic drug: loperamide        aspirin 325 MG tablet  Replaced by: aspirin 81 MG EC tablet        budesonide-formoterol 160-4.5 MCG/ACT inhaler  Commonly known as: SYMBICORT        famotidine 20 MG tablet  Commonly known as: PEPCID        ipratropium-albuterol  MCG/ACT inhaler  Commonly known as: COMBIVENT RESPIMAT        vitamin D3 125 MCG (5000 UT) capsule capsule                  Where to Get Your Medications        Information about where to get these medications is not yet available    Ask your nurse or doctor about these medications  aspirin 81 MG EC tablet  insulin lispro 100 UNIT/ML injection          Future Appointments   Date Time Provider Department Center   3/17/2025 11:00 AM [facility] RM 2 BH [facility]   3/17/2025 11:45 AM Katrin Shah APRN [facility]   10/1/2025  2:00 PM Ania Amaya MD [facility]      Contact information for follow-up providers       Belle Simons APRN .    Specialty: Family Medicine  Contact information:  [address] [id number] [phone]                       Contact information for after-discharge care       Destination        [facility] .    Service: Skilled Nursing  Contact information:  [address] [facility] [address] [phone]                                   TEST  RESULTS PENDING AT DISCHARGE         Asim Hogue MD  [facility]  03/15/25  11:34 EDT      Time: greater than 32 minutes on discharge  It was a pleasure taking care of this patient while in the hospital.

## 2025-03-15 NOTE — PLAN OF CARE
Goal Outcome Evaluation:            Pt rested in bed this shift. Dialysis completed with no issues. All VSS. Still waiting on precert from insurance for rehab placement.

## 2025-03-15 NOTE — NURSING NOTE
RN contacted [facility] to request status update on pending pre-cert request for d/c placement; voicemail completed. Will wait for returned call.

## 2025-03-16 ENCOUNTER — APPOINTMENT (OUTPATIENT)
Dept: GENERAL RADIOLOGY | Facility: HOSPITAL | Age: 70
DRG: 314 | End: 2025-03-16
Payer: MEDICARE

## 2025-03-16 ENCOUNTER — APPOINTMENT (OUTPATIENT)
Dept: CARDIOLOGY | Facility: HOSPITAL | Age: 70
DRG: 314 | End: 2025-03-16
Payer: MEDICARE

## 2025-03-16 ENCOUNTER — APPOINTMENT (OUTPATIENT)
Dept: CT IMAGING | Facility: HOSPITAL | Age: 70
DRG: 314 | End: 2025-03-16
Payer: MEDICARE

## 2025-03-16 PROBLEM — K56.7 ILEUS: Status: ACTIVE | Noted: 2025-03-16

## 2025-03-16 LAB
ALBUMIN SERPL-MCNC: 3.3 G/DL (ref 3.5–5.2)
ALBUMIN/GLOB SERPL: 1.1 G/DL
ALP SERPL-CCNC: 104 U/L (ref 39–117)
ALT SERPL W P-5'-P-CCNC: 17 U/L (ref 1–41)
ANION GAP SERPL CALCULATED.3IONS-SCNC: 14.4 MMOL/L (ref 5–15)
ANION GAP SERPL CALCULATED.3IONS-SCNC: 18 MMOL/L (ref 5–15)
AST SERPL-CCNC: 18 U/L (ref 1–40)
BASOPHILS # BLD AUTO: 0.01 10*3/MM3 (ref 0–0.2)
BASOPHILS NFR BLD AUTO: 0.1 % (ref 0–1.5)
BH CV ECHO MEAS - EDV(CUBED): 118.8 ML
BH CV ECHO MEAS - EDV(MOD-SP2): 105 ML
BH CV ECHO MEAS - EDV(MOD-SP4): 113 ML
BH CV ECHO MEAS - EF(MOD-SP2): 34.3 %
BH CV ECHO MEAS - EF(MOD-SP4): 31.9 %
BH CV ECHO MEAS - ESV(CUBED): 67.8 ML
BH CV ECHO MEAS - ESV(MOD-SP2): 69 ML
BH CV ECHO MEAS - ESV(MOD-SP4): 77 ML
BH CV ECHO MEAS - FS: 17.1 %
BH CV ECHO MEAS - IVS/LVPW: 1.05 CM
BH CV ECHO MEAS - IVSD: 1.03 CM
BH CV ECHO MEAS - LAT PEAK E' VEL: 8.8 CM/SEC
BH CV ECHO MEAS - LV MASS(C)D: 177.6 GRAMS
BH CV ECHO MEAS - LVIDD: 4.9 CM
BH CV ECHO MEAS - LVIDS: 4.1 CM
BH CV ECHO MEAS - LVPWD: 0.98 CM
BH CV ECHO MEAS - MED PEAK E' VEL: 6.6 CM/SEC
BH CV ECHO MEAS - RAP SYSTOLE: 15 MMHG
BH CV ECHO MEAS - RVSP: 28.8 MMHG
BH CV ECHO MEAS - SV(MOD-SP2): 36 ML
BH CV ECHO MEAS - SV(MOD-SP4): 36 ML
BH CV ECHO MEAS - TR MAX PG: 13.8 MMHG
BH CV ECHO MEAS - TR MAX VEL: 185.8 CM/SEC
BH CV XLRA - TDI S': 8.8 CM/SEC
BILIRUB SERPL-MCNC: 0.3 MG/DL (ref 0–1.2)
BUN SERPL-MCNC: 43 MG/DL (ref 8–23)
BUN SERPL-MCNC: 51 MG/DL (ref 8–23)
BUN/CREAT SERPL: 14.5 (ref 7–25)
BUN/CREAT SERPL: 14.5 (ref 7–25)
C DIFF TOX GENS STL QL NAA+PROBE: NEGATIVE
CALCIUM SPEC-SCNC: 8.4 MG/DL (ref 8.6–10.5)
CALCIUM SPEC-SCNC: 9 MG/DL (ref 8.6–10.5)
CHLORIDE SERPL-SCNC: 101 MMOL/L (ref 98–107)
CHLORIDE SERPL-SCNC: 99 MMOL/L (ref 98–107)
CHOLEST SERPL-MCNC: 114 MG/DL (ref 0–200)
CO2 SERPL-SCNC: 22 MMOL/L (ref 22–29)
CO2 SERPL-SCNC: 22.6 MMOL/L (ref 22–29)
CORTIS SERPL-MCNC: 21.6 MCG/DL
CREAT SERPL-MCNC: 2.96 MG/DL (ref 0.76–1.27)
CREAT SERPL-MCNC: 3.51 MG/DL (ref 0.76–1.27)
D-LACTATE SERPL-SCNC: 1.5 MMOL/L (ref 0.5–2)
D-LACTATE SERPL-SCNC: 1.9 MMOL/L (ref 0.5–2)
DEPRECATED RDW RBC AUTO: 48.7 FL (ref 37–54)
DEPRECATED RDW RBC AUTO: 50 FL (ref 37–54)
EGFRCR SERPLBLD CKD-EPI 2021: 18.1 ML/MIN/1.73
EGFRCR SERPLBLD CKD-EPI 2021: 22.2 ML/MIN/1.73
EOSINOPHIL # BLD AUTO: 0.04 10*3/MM3 (ref 0–0.4)
EOSINOPHIL NFR BLD AUTO: 0.3 % (ref 0.3–6.2)
ERYTHROCYTE [DISTWIDTH] IN BLOOD BY AUTOMATED COUNT: 14.8 % (ref 12.3–15.4)
ERYTHROCYTE [DISTWIDTH] IN BLOOD BY AUTOMATED COUNT: 15 % (ref 12.3–15.4)
GLOBULIN UR ELPH-MCNC: 3 GM/DL
GLUCOSE BLDC GLUCOMTR-MCNC: 131 MG/DL (ref 70–130)
GLUCOSE BLDC GLUCOMTR-MCNC: 140 MG/DL (ref 70–130)
GLUCOSE BLDC GLUCOMTR-MCNC: 143 MG/DL (ref 70–130)
GLUCOSE BLDC GLUCOMTR-MCNC: 188 MG/DL (ref 70–130)
GLUCOSE BLDC GLUCOMTR-MCNC: 225 MG/DL (ref 70–130)
GLUCOSE SERPL-MCNC: 140 MG/DL (ref 65–99)
GLUCOSE SERPL-MCNC: 219 MG/DL (ref 65–99)
HCT VFR BLD AUTO: 31.3 % (ref 37.5–51)
HCT VFR BLD AUTO: 36.4 % (ref 37.5–51)
HDLC SERPL-MCNC: 48 MG/DL (ref 40–60)
HGB BLD-MCNC: 11 G/DL (ref 13–17.7)
HGB BLD-MCNC: 9.4 G/DL (ref 13–17.7)
IMM GRANULOCYTES # BLD AUTO: 0.08 10*3/MM3 (ref 0–0.05)
IMM GRANULOCYTES NFR BLD AUTO: 0.7 % (ref 0–0.5)
LDLC SERPL CALC-MCNC: 53 MG/DL (ref 0–100)
LDLC/HDLC SERPL: 1.15 {RATIO}
LV EF BIPLANE MOD: 32.8 %
LYMPHOCYTES # BLD AUTO: 0.17 10*3/MM3 (ref 0.7–3.1)
LYMPHOCYTES NFR BLD AUTO: 1.4 % (ref 19.6–45.3)
MAGNESIUM SERPL-MCNC: 1.8 MG/DL (ref 1.6–2.4)
MCH RBC QN AUTO: 27.2 PG (ref 26.6–33)
MCH RBC QN AUTO: 27.2 PG (ref 26.6–33)
MCHC RBC AUTO-ENTMCNC: 30 G/DL (ref 31.5–35.7)
MCHC RBC AUTO-ENTMCNC: 30.2 G/DL (ref 31.5–35.7)
MCV RBC AUTO: 89.9 FL (ref 79–97)
MCV RBC AUTO: 90.5 FL (ref 79–97)
MONOCYTES # BLD AUTO: 0.51 10*3/MM3 (ref 0.1–0.9)
MONOCYTES NFR BLD AUTO: 4.3 % (ref 5–12)
NEUTROPHILS NFR BLD AUTO: 11.13 10*3/MM3 (ref 1.7–7)
NEUTROPHILS NFR BLD AUTO: 93.2 % (ref 42.7–76)
NRBC BLD AUTO-RTO: 0 /100 WBC (ref 0–0.2)
PLATELET # BLD AUTO: 233 10*3/MM3 (ref 140–450)
PLATELET # BLD AUTO: 260 10*3/MM3 (ref 140–450)
PMV BLD AUTO: 10.4 FL (ref 6–12)
PMV BLD AUTO: 10.6 FL (ref 6–12)
POTASSIUM SERPL-SCNC: 4.1 MMOL/L (ref 3.5–5.2)
PROCALCITONIN SERPL-MCNC: 0.29 NG/ML (ref 0–0.25)
PROCALCITONIN SERPL-MCNC: 0.81 NG/ML (ref 0–0.25)
PROT SERPL-MCNC: 6.3 G/DL (ref 6–8.5)
QT INTERVAL: 357 MS
QT INTERVAL: 370 MS
QT INTERVAL: 370 MS
QT INTERVAL: 448 MS
QTC INTERVAL: 452 MS
QTC INTERVAL: 488 MS
QTC INTERVAL: 531 MS
QTC INTERVAL: 532 MS
RBC # BLD AUTO: 3.46 10*6/MM3 (ref 4.14–5.8)
RBC # BLD AUTO: 4.05 10*6/MM3 (ref 4.14–5.8)
SODIUM SERPL-SCNC: 138 MMOL/L (ref 136–145)
SODIUM SERPL-SCNC: 139 MMOL/L (ref 136–145)
TRIGL SERPL-MCNC: 55 MG/DL (ref 0–150)
VLDLC SERPL-MCNC: 13 MG/DL (ref 5–40)
WBC NRBC COR # BLD AUTO: 10.7 10*3/MM3 (ref 3.4–10.8)
WBC NRBC COR # BLD AUTO: 11.94 10*3/MM3 (ref 3.4–10.8)

## 2025-03-16 PROCEDURE — 63710000001 INSULIN LISPRO (HUMAN) PER 5 UNITS: Performed by: NURSE PRACTITIONER

## 2025-03-16 PROCEDURE — 71045 X-RAY EXAM CHEST 1 VIEW: CPT

## 2025-03-16 PROCEDURE — 74177 CT ABD & PELVIS W/CONTRAST: CPT

## 2025-03-16 PROCEDURE — 93005 ELECTROCARDIOGRAM TRACING: CPT | Performed by: INTERNAL MEDICINE

## 2025-03-16 PROCEDURE — 93308 TTE F-UP OR LMTD: CPT

## 2025-03-16 PROCEDURE — G0378 HOSPITAL OBSERVATION PER HR: HCPCS

## 2025-03-16 PROCEDURE — 70450 CT HEAD/BRAIN W/O DYE: CPT

## 2025-03-16 PROCEDURE — 25510000001 PERFLUTREN 6.52 MG/ML SUSPENSION 2 ML VIAL: Performed by: INTERNAL MEDICINE

## 2025-03-16 PROCEDURE — 93321 DOPPLER ECHO F-UP/LMTD STD: CPT | Performed by: INTERNAL MEDICINE

## 2025-03-16 PROCEDURE — 25510000001 IOPAMIDOL 61 % SOLUTION: Performed by: INTERNAL MEDICINE

## 2025-03-16 PROCEDURE — 25010000002 DIGOXIN PER 500 MCG: Performed by: INTERNAL MEDICINE

## 2025-03-16 PROCEDURE — 87040 BLOOD CULTURE FOR BACTERIA: CPT

## 2025-03-16 PROCEDURE — 99222 1ST HOSP IP/OBS MODERATE 55: CPT | Performed by: SURGERY

## 2025-03-16 PROCEDURE — 93010 ELECTROCARDIOGRAM REPORT: CPT | Performed by: STUDENT IN AN ORGANIZED HEALTH CARE EDUCATION/TRAINING PROGRAM

## 2025-03-16 PROCEDURE — 82533 TOTAL CORTISOL: CPT | Performed by: INTERNAL MEDICINE

## 2025-03-16 PROCEDURE — 84132 ASSAY OF SERUM POTASSIUM: CPT | Performed by: INTERNAL MEDICINE

## 2025-03-16 PROCEDURE — 93321 DOPPLER ECHO F-UP/LMTD STD: CPT

## 2025-03-16 PROCEDURE — 25010000002 ONDANSETRON PER 1 MG: Performed by: NURSE PRACTITIONER

## 2025-03-16 PROCEDURE — 83735 ASSAY OF MAGNESIUM: CPT | Performed by: INTERNAL MEDICINE

## 2025-03-16 PROCEDURE — 93325 DOPPLER ECHO COLOR FLOW MAPG: CPT

## 2025-03-16 PROCEDURE — 80061 LIPID PANEL: CPT | Performed by: INTERNAL MEDICINE

## 2025-03-16 PROCEDURE — 83605 ASSAY OF LACTIC ACID: CPT | Performed by: INTERNAL MEDICINE

## 2025-03-16 PROCEDURE — 80053 COMPREHEN METABOLIC PANEL: CPT | Performed by: INTERNAL MEDICINE

## 2025-03-16 PROCEDURE — 83605 ASSAY OF LACTIC ACID: CPT

## 2025-03-16 PROCEDURE — 93308 TTE F-UP OR LMTD: CPT | Performed by: INTERNAL MEDICINE

## 2025-03-16 PROCEDURE — 87493 C DIFF AMPLIFIED PROBE: CPT

## 2025-03-16 PROCEDURE — 82948 REAGENT STRIP/BLOOD GLUCOSE: CPT

## 2025-03-16 PROCEDURE — 84145 PROCALCITONIN (PCT): CPT | Performed by: INTERNAL MEDICINE

## 2025-03-16 PROCEDURE — 85027 COMPLETE CBC AUTOMATED: CPT | Performed by: INTERNAL MEDICINE

## 2025-03-16 PROCEDURE — 25810000003 SODIUM CHLORIDE 0.9 % SOLUTION: Performed by: INTERNAL MEDICINE

## 2025-03-16 PROCEDURE — 85025 COMPLETE CBC W/AUTO DIFF WBC: CPT | Performed by: NURSE PRACTITIONER

## 2025-03-16 PROCEDURE — 25010000002 AMPICILLIN-SULBACTAM PER 1.5 G: Performed by: INTERNAL MEDICINE

## 2025-03-16 PROCEDURE — 84145 PROCALCITONIN (PCT): CPT

## 2025-03-16 PROCEDURE — 93325 DOPPLER ECHO COLOR FLOW MAPG: CPT | Performed by: INTERNAL MEDICINE

## 2025-03-16 PROCEDURE — 74018 RADEX ABDOMEN 1 VIEW: CPT

## 2025-03-16 PROCEDURE — 99215 OFFICE O/P EST HI 40 MIN: CPT | Performed by: INTERNAL MEDICINE

## 2025-03-16 RX ORDER — MIDODRINE HYDROCHLORIDE 5 MG/1
5 TABLET ORAL ONCE
Status: COMPLETED | OUTPATIENT
Start: 2025-03-16 | End: 2025-03-16

## 2025-03-16 RX ORDER — MIDODRINE HYDROCHLORIDE 5 MG/1
10 TABLET ORAL
Status: DISCONTINUED | OUTPATIENT
Start: 2025-03-16 | End: 2025-03-18

## 2025-03-16 RX ORDER — ECHINACEA PURPUREA EXTRACT 125 MG
2 TABLET ORAL AS NEEDED
Status: DISCONTINUED | OUTPATIENT
Start: 2025-03-16 | End: 2025-03-20 | Stop reason: HOSPADM

## 2025-03-16 RX ORDER — IOPAMIDOL 612 MG/ML
100 INJECTION, SOLUTION INTRAVASCULAR
Status: COMPLETED | OUTPATIENT
Start: 2025-03-16 | End: 2025-03-16

## 2025-03-16 RX ORDER — PHENYLEPHRINE HCL IN 0.9% NACL 0.5 MG/5ML
.5-3 SYRINGE (ML) INTRAVENOUS
Status: DISCONTINUED | OUTPATIENT
Start: 2025-03-16 | End: 2025-03-20 | Stop reason: HOSPADM

## 2025-03-16 RX ORDER — DIGOXIN 0.25 MG/ML
125 INJECTION INTRAMUSCULAR; INTRAVENOUS ONCE
Status: COMPLETED | OUTPATIENT
Start: 2025-03-16 | End: 2025-03-16

## 2025-03-16 RX ADMIN — MIDODRINE HYDROCHLORIDE 10 MG: 5 TABLET ORAL at 18:01

## 2025-03-16 RX ADMIN — IOPAMIDOL 85 ML: 612 INJECTION, SOLUTION INTRAVENOUS at 15:07

## 2025-03-16 RX ADMIN — LEVOTHYROXINE SODIUM 75 MCG: 0.07 TABLET ORAL at 06:23

## 2025-03-16 RX ADMIN — ONDANSETRON 4 MG: 2 INJECTION, SOLUTION INTRAMUSCULAR; INTRAVENOUS at 02:55

## 2025-03-16 RX ADMIN — MUPIROCIN 1 APPLICATION: 20 OINTMENT TOPICAL at 18:25

## 2025-03-16 RX ADMIN — INSULIN LISPRO 2 UNITS: 100 INJECTION, SOLUTION INTRAVENOUS; SUBCUTANEOUS at 21:56

## 2025-03-16 RX ADMIN — APIXABAN 2.5 MG: 2.5 TABLET, FILM COATED ORAL at 20:30

## 2025-03-16 RX ADMIN — INSULIN LISPRO 3 UNITS: 100 INJECTION, SOLUTION INTRAVENOUS; SUBCUTANEOUS at 08:28

## 2025-03-16 RX ADMIN — ATORVASTATIN CALCIUM 80 MG: 80 TABLET, FILM COATED ORAL at 20:30

## 2025-03-16 RX ADMIN — Medication 10 ML: at 08:28

## 2025-03-16 RX ADMIN — SODIUM CHLORIDE 3 G: 9 INJECTION, SOLUTION INTRAVENOUS at 18:00

## 2025-03-16 RX ADMIN — MIDODRINE HYDROCHLORIDE 5 MG: 5 TABLET ORAL at 12:56

## 2025-03-16 RX ADMIN — ASPIRIN 325 MG: 325 TABLET, COATED ORAL at 08:28

## 2025-03-16 RX ADMIN — PANTOPRAZOLE SODIUM 40 MG: 40 TABLET, DELAYED RELEASE ORAL at 06:23

## 2025-03-16 RX ADMIN — PERFLUTREN 3 ML: 6.52 INJECTION, SUSPENSION INTRAVENOUS at 12:49

## 2025-03-16 RX ADMIN — TAMSULOSIN HYDROCHLORIDE 0.4 MG: 0.4 CAPSULE ORAL at 08:28

## 2025-03-16 RX ADMIN — APIXABAN 2.5 MG: 2.5 TABLET, FILM COATED ORAL at 08:28

## 2025-03-16 RX ADMIN — SODIUM CHLORIDE 500 ML: 0.9 INJECTION, SOLUTION INTRAVENOUS at 11:19

## 2025-03-16 RX ADMIN — PANTOPRAZOLE SODIUM 40 MG: 40 TABLET, DELAYED RELEASE ORAL at 18:01

## 2025-03-16 RX ADMIN — DIGOXIN 125 MCG: 0.25 INJECTION INTRAMUSCULAR; INTRAVENOUS at 11:30

## 2025-03-16 RX ADMIN — MIDODRINE HYDROCHLORIDE 5 MG: 5 TABLET ORAL at 10:50

## 2025-03-16 RX ADMIN — Medication 10 ML: at 20:30

## 2025-03-16 NOTE — PROGRESS NOTES
Name: Carlito Mo ADMIT: 3/10/2025   : 1955  PCP: Belle Simons APRN    MRN: [id number] LOS: 0 days   AGE/SEX: 69 y.o. male  ROOM: [facility]   Subjective   Chief Complaint   Patient presents with    Weakness - Generalized     HD today  Resting on HD  Tolerating PO        Objective   Vital Signs  Temp:  [98.1 °F (36.7 °C)-99.1 °F (37.3 °C)] 98.2 °F (36.8 °C)  Heart Rate:  [] 111  Resp:  [16-18] 18  BP: ()/(42-77) 86/45  SpO2:  [98 %-99 %] 98 %  on   ;   Device (Oxygen Therapy): room air  Body mass index is 18.85 kg/m².    Physical Exam  HENT:      Head: Normocephalic and atraumatic.   Eyes:      General: No scleral icterus.  Cardiovascular:      Rate and Rhythm: Normal rate and regular rhythm.      Heart sounds: Normal heart sounds.   Pulmonary:      Effort: Pulmonary effort is normal. No respiratory distress.      Breath sounds: Normal breath sounds.   Abdominal:      General: There is no distension.      Palpations: Abdomen is soft.      Tenderness: There is no abdominal tenderness.   Musculoskeletal:      Cervical back: Neck supple.     Chronically ill  On HD  resting    Results Review:       I reviewed the patient's new clinical results.  Results from last 7 days   Lab Units 25  1151 25  0301 03/15/25  0409 25  0414   WBC 10*3/mm3 10.70 11.94* 4.76 4.91   HEMOGLOBIN g/dL 9.4* 11.0* 8.5* 8.7*   PLATELETS 10*3/mm3 233 260 213 205     Results from last 7 days   Lab Units 25  1138 25  0301 03/15/25  0409 25  0414   SODIUM mmol/L 138 139 134* 139   POTASSIUM mmol/L 4.1  4.1 4.1 4.7 4.4   CHLORIDE mmol/L 101 99 99 104   CO2 mmol/L 22.6 22.0 25.0 26.0   BUN mg/dL 51* 43* 50* 35*   CREATININE mg/dL 3.51* 2.96* 3.56* 2.81*   GLUCOSE mg/dL 140* 219* 115* 156*   Estimated Creatinine Clearance: 17.7 mL/min (A) (by C-G formula based on SCr of 3.51 mg/dL (H)).  Results from last 7 days   Lab Units 25  1138 25  0518 03/10/25  1854   ALBUMIN g/dL 3.3* 3.3*  "3.5   BILIRUBIN mg/dL 0.3  --  0.4   ALK PHOS U/L 104  --  104   AST (SGOT) U/L 18  --  17   ALT (SGPT) U/L 17  --  16     Results from last 7 days   Lab Units 03/16/25  1138 03/16/25  0301 03/15/25  0409 03/14/25  0414 03/13/25  0501 03/12/25  0518 03/11/25  0500 03/10/25  1854   CALCIUM mg/dL 8.4* 9.0 8.2* 8.2* 8.5* 8.5*   < > 9.4   ALBUMIN g/dL 3.3*  --   --   --   --  3.3*  --  3.5   MAGNESIUM mg/dL 1.8  --   --  1.9  --   --   --   --    PHOSPHORUS mg/dL  --   --   --  3.2 3.6 3.2  --   --     < > = values in this interval not displayed.     Results from last 7 days   Lab Units 03/16/25  1151 03/16/25  1138 03/16/25  0420 03/16/25  0301   PROCALCITONIN ng/mL  --  0.81*  --  0.29*   LACTATE mmol/L 1.5  --  1.9  --        Coag     HbA1C   Lab Results   Component Value Date    HGBA1C 7.10 (H) 03/10/2025    HGBA1C 8 (H) 02/19/2025    HGBA1C 8.2 (H) 01/08/2025     Infection   Results from last 7 days   Lab Units 03/16/25  1138 03/16/25  0301   PROCALCITONIN ng/mL 0.81* 0.29*     Radiology(recent) XR Abdomen KUB  Result Date: 3/16/2025  Mildly prominent loop of small bowel within the left upper quadrant could reflect some localized ileus. However, there is no clear evidence of small bowel obstruction.  This report was finalized on 3/16/2025 5:08 AM by Dr. Alice Allen M.D on Workstation: [id number]      XR Chest 1 View  Result Date: 3/16/2025  No acute findings.  This report was finalized on 3/16/2025 5:05 AM by Dr. Alice Allen M.D on Workstation: [id number]      No results found for: \"TROPONINT\", \"TROPONINI\", \"BNP\"  No components found for: \"TSH;2\"    apixaban, 2.5 mg, Oral, BID  aspirin, 325 mg, Oral, Daily  atorvastatin, 80 mg, Oral, Nightly  insulin lispro, 2-7 Units, Subcutaneous, 4x Daily AC & at Bedtime  levothyroxine, 75 mcg, Oral, Q AM  midodrine, 10 mg, Oral, TID AC  midodrine, 5 mg, Oral, Once  pantoprazole, 40 mg, Oral, BID AC  sodium chloride, 10 mL, Intravenous, Q12H  tamsulosin, 0.4 mg, " Oral, Daily      phenylephrine, 0.5-3 mcg/kg/min    NPO Diet NPO Type: Sips with Meds      Assessment & Plan      Active Hospital Problems    Diagnosis  POA    **Generalized weakness [R53.1]  Yes    Ileus [K56.7]  Unknown    Chronic HFrEF (heart failure with reduced ejection fraction) [I50.22]  Yes    ESRD (end stage renal disease) [N18.6]  Yes    History of stroke [Z86.73]  Not Applicable    Paroxysmal atrial fibrillation [I48.0]  Yes    Embolic stroke [I63.9]  Yes    Essential hypertension [I10]  Yes    Coronary artery disease involving native coronary artery of native heart without angina pectoris [I25.10]  Yes    Acquired hypothyroidism [E03.9]  Yes    Type 2 diabetes mellitus, with long-term current use of insulin [E11.9, Z79.4]  Not Applicable      Resolved Hospital Problems   No resolved problems to display.       Patient is a 69 y.o. male ESRD on dialysis, CAD, Afib and h/o stroke on eliquis who presented with increasing weakness and a fall at home , right hip pain- XR negative - no further complaints of pain. He worked with therapy and would benefit from rehab stay to get stronger.     Had hoped for rehab today but precert not yet obtained, continue to monitor in the hospital      DW staff      Asim Hogue MD  [facility]  03/16/25  12:52 EDT

## 2025-03-16 NOTE — NURSING NOTE
CTU contacted nursing staff reporting rhythm change prior to RRT, with pt tachycardic and intermittent atrial flutter. EKG was performed at bedside at that time.    RRT called at 1045 regarding status change and critical VS reported by NA. Pt reports lethargy, SOB, dizziness. Manual BP taken and documented as 72/64, 68/58. Additional EKG ordered, performed, and recorded pt ST with RBBB, and performed again catching atrial flutter.    Attending provider MD Mykel, notified, and orders placed. Nephrologist MD Reginald, contacted and ordered pt transfer to receive vasopressors.     Transfer initiated to CCU; report given to DEMAR Diaz.     Spouse contacted; voicemail left.

## 2025-03-16 NOTE — CONSULTS
[facility]  [phone]  Dr. Jhonatan Carolina      Subjective   LOS: 0 days     69-year-old male I was asked to see by hospitalist after transfer to ICU.  Patient with A-fib RVR and received digoxin en route to the units.  Underlying cardiomyopathy and history A-fib on anticoagulation.  Patient appears to be very weak.  He answers my questions but appears tired and fatigued.  Underlying ESRD, history stroke.  This admission has complained of nausea and abdominal pain.  There was impression that he may have ileus and has been seen by surgery.  They feel his abdomen is benign.  This admission was precipitated by a fall on his buttocks.  He does have underlying diabetes mellitus.    Carlito Mo  reports no history of alcohol use.,  reports that he has never smoked. He has never used smokeless tobacco.     Past Hx:  has a past medical history of Acquired hypothyroidism, Acute sinusitis, SYLVAIN (acute kidney injury), Anemia, Arthritis, Atrial fibrillation, CAD (coronary artery disease), Chronic combined systolic and diastolic congestive heart failure, COPD (chronic obstructive pulmonary disease), Depression, Diabetic neuropathy (04/11/2022), Esophagitis (06/10/2020), ESRD (end stage renal disease) (12/01/2023), Frequent PVCs, Generalized weakness, GERD (gastroesophageal reflux disease), Helicobacter pylori gastritis (06/04/2020), Hyperlipidemia, Hypertension, Hypertensive encephalopathy, Pleural effusion, Pneumonia, Poor historian, RBBB (right bundle branch block), Stroke, TIA (transient ischemic attack), Type 2 diabetes mellitus, Urinary retention, and Vitamin D deficiency.  Surg Hx:  has a past surgical history that includes Coronary artery bypass graft (11/2001); Appendectomy (N/A, 02/14/2016); Tonsillectomy; Vasectomy; Colonoscopy; Colonoscopy (N/A, 09/18/2018); Colonoscopy (N/A, 09/19/2018); Hernia repair (Left, 12/05/2019); Esophagogastroduodenoscopy (N/A, 05/29/2020); Colonoscopy (N/A, 06/01/2020);  Esophagogastroduodenoscopy (N/A, 09/15/2020); arteriovenous fistula/shunt surgery (Right, 06/03/2022); Thoracentesis; and Esophagogastroduodenoscopy (N/A, 12/4/2023).  FH: family history includes Alcohol abuse in his brother and father; Autism in his son; Cancer in his father; Cirrhosis in his brother; Diabetes in his mother and son; Heart disease in his father; Hypertension in his mother; Kidney failure in his son; Liver cancer (age of onset: 45) in his brother; Macular degeneration in his mother.  SH:  reports that he has never smoked. He has never used smokeless tobacco. He reports that he does not drink alcohol and does not use drugs.    Medications Prior to Admission   Medication Sig Dispense Refill Last Dose/Taking    atorvastatin (LIPITOR) 80 MG tablet Take 1 tablet by mouth Every Night. 90 tablet 0 Taking    Anti-Diarrheal 2 MG tablet TAKE ONE TABLET BY MOUTH TWICE DAILY AS NEEDED FOR DIARRHEA (Patient not taking: Reported on 10/25/2024)       aspirin 325 MG tablet Take 1 tablet by mouth Daily. (Patient not taking: Reported on 10/25/2024) 30 tablet 0     budesonide-formoterol (SYMBICORT) 160-4.5 MCG/ACT inhaler Inhale 2 puffs 2 (Two) Times a Day. (Patient not taking: Reported on 10/25/2024) 10.2 g 0     Eliquis 2.5 MG tablet tablet TAKE ONE TABLET BY MOUTH TWICE DAILY 60 tablet 11     famotidine (PEPCID) 20 MG tablet Take 1 tablet by mouth 2 (Two) Times a Day As Needed for Heartburn. (Patient not taking: Reported on 10/25/2024) 30 tablet 0     ipratropium-albuterol (COMBIVENT RESPIMAT)  MCG/ACT inhaler Inhale 1 puff 4 (Four) Times a Day As Needed for Wheezing. (Patient not taking: Reported on 10/25/2024) 4 g 0     levothyroxine (SYNTHROID, LEVOTHROID) 75 MCG tablet Take 1 tablet by mouth Every Morning. 30 tablet 0     pantoprazole (PROTONIX) 40 MG EC tablet Take 1 tablet by mouth 2 (Two) Times a Day Before Meals. 30 tablet 0     tamsulosin (FLOMAX) 0.4 MG capsule 24 hr capsule Take 1 capsule by mouth  Daily. 30 capsule 0     vitamin D3 125 MCG (5000 UT) capsule capsule Take 1 capsule by mouth Daily. (Patient not taking: Reported on 10/25/2024) 30 capsule 0      Allergies   Allergen Reactions    Prozac [Fluoxetine Hcl] Other (See Comments)     shaking       Review of Systems   Unable to perform ROS: Acuity of condition       Vital Signs past 24hrs  BP range: BP: ()/(42-77) 97/61  Pulse range: Heart Rate:  [] 109  Resp rate range: Resp:  [16-18] 18  Temp range: Temp (24hrs), Av.4 °F (36.9 °C), Min:98.1 °F (36.7 °C), Max:99.1 °F (37.3 °C)    Oxygen range: SpO2:  [98 %-99 %] 98 %;  ;   Device (Oxygen Therapy): room air  63 kg (139 lb); Body mass index is 18.85 kg/m².  Net IO Since Admission: 348.5 mL [25 1319]      Mechanical Ventilator:     Adult male who looks weak and fatigued.  Pupils equal and reactive.  Patient is thin appearing.  Lungs reveal bilateral air entry and clear.  Heart examination S1-S2 present and tachycardic but appears regular at the moment.  No edema lower extremities.  Abdomen, patient is guarding and does not permit full examination.  He reports that it is tender.  No masses palpated.  No peripheral cyanosis clubbing.    Results Review:    I have reviewed the laboratory and imaging data from current admission. My annotations are as noted in assessment and plan.  Result Review:  I have personally reviewed the results from the time of this admission to 3/16/2025 13:19 EDT and agree with these findings:  [x]  Laboratory list / accordion  [x]  Microbiology  [x]  Radiology  []  EKG/Telemetry   []  Cardiology/Vascular   []  Pathology  []  Old records  []  Other:        Medication Review:  I have reviewed the current MAR. My annotations are as noted in assessment and plan.    phenylephrine, 0.5-3 mcg/kg/min      Lines, Drains & Airways       Active LDAs       Name Placement date Placement time Site Days    Peripheral IV 03/10/25 Anterior;Left;Proximal Forearm 03/10/25  --   Forearm  6    Peripheral IV 03/16/25 1106 Left Wrist 03/16/25  1106  Wrist  less than 1                  Isolation status: Contact Spore    Dietary Orders (From admission, onward)       Start     Ordered    03/16/25 0548  NPO Diet NPO Type: Sips with Meds  Diet Effective Now        Question:  NPO Type  Answer:  Sips with Meds    03/16/25 0549                    Isolation:  Contact Spore    PCCM Problems  Hypotension presumably cardiogenic  Chronic hypotension on midodrine  A-fib with RVR  ESRD on HD  Type 2 diabetes mellitus  History CVA  Chronic anticoagulation for A-fib  Cardiomyopathy with EF 45%  CAD  Nausea and vomiting  Abdominal pain      Plan of Treatment    Hypotension without any evidence for sepsis.  Most likely cardiogenic.  Lactate is normal.  Elevated procalcitonin in the context of ESRD is not reliable.  Will use Jaspreet-Synephrine if required.    A-fib with RVR and discussed with cardiology.  Will use amiodarone due to borderline blood pressure his heart rate is above 120s.    Came in with mechanical fall and looks lethargic - get CT head also.    End-stage renal disease and nephrology is following.    Anemia and monitor.  So far no evidence of acute blood loss.    Underlying cardiomyopathy with mildly reduced EF of 45 to 47%.    Nausea and vomiting with abdominal pain.  Appreciate general surgery input.  If the pain persists we will consider getting a CT abdomen.  Could be some underlying gastroparesis due to diabetes.    Sliding scale for diabetes.    I spent 35 mins critical care time in care of this patient outside of any procedures and not overlapping with any other provider.       Electronically signed by Jhonatan Carolina MD, 03/16/25, 1:19 PM EDT.      Part of this note may be an electronic transcription/translation of spoken language to printed text using the Dragon Dictation System.

## 2025-03-16 NOTE — PROGRESS NOTES
Full note to follow. Hypotension in HD patient with rapid HR. Getting ekg and giving cautious fluid bolus with HD patient- discussed with nephrology. Will see if responds to the bolus and interventions.

## 2025-03-16 NOTE — PROGRESS NOTES
CT abdomen shows mild infiltrate in RLL. Given events and hx of n/v; will rx with abx.    Electronically signed by Jhonatan Carolina MD, 03/16/25, 4:13 PM EDT.

## 2025-03-16 NOTE — CODE DOCUMENTATION
"Patient Name:  Carlito Mo  YOB: 1955  MRN:  [id number]  Admit Date:  3/10/2025    Visit Diagnoses:     ICD-10-CM ICD-9-CM   1. Generalized weakness  R53.1 780.79   2. ESRD on dialysis  N18.6 585.6    Z99.2 V45.11   3. Fall, initial encounter  W19.XXXA E888.9       Reason For Rapid:   hypotension    RN Communicated With:   Dr. Hogue, Dr. Montelongo;   new consults for pulm and cards      Rapid Outcome:   transfer to CC    Communication From Rapid Team:    New onset afib/flutter w/ associated hypotension. IVF bolus 500mL per Dr. Montelongo. Moving to CCU for further management and possible vasopressors.       Most Recent Vital Signs  Temp:  [98.1 °F (36.7 °C)-99.1 °F (37.3 °C)] 98.4 °F (36.9 °C)  Heart Rate:  [] 120  Resp:  [16-18] 18  BP: ()/(42-77) 76/76  SpO2:  [98 %-99 %] 98 %  on   ;        Labs:      Glucose   Date/Time Value Ref Range Status   03/16/2025 1032 140 (H) 70 - 130 mg/dL Final   03/16/2025 0613 225 (H) 70 - 130 mg/dL Final   03/15/2025 2052 85 70 - 130 mg/dL Final   03/15/2025 1552 220 (H) 70 - 130 mg/dL Final   03/15/2025 1108 148 (H) 70 - 130 mg/dL Final   03/15/2025 0408 128 70 - 130 mg/dL Final   03/14/2025 2202 161 (H) 70 - 130 mg/dL Final     No results found for: \"SITE\", \"ALLENTEST\", \"PHART\", \"KUF2ZAY\", \"PO2ART\", \"NJU3TQE\", \"BASEEXCESS\", \"Y3ITJGOJ\", \"HGBBG\", \"HCTABG\", \"OXYHEMOGLOBI\", \"METHHGBN\", \"CARBOXYHGB\", \"CO2CT\", \"BAROMETRIC\", \"MODALITY\", \"FIO2\"  Results from last 7 days   Lab Units 03/16/25  0301 03/15/25  0409 03/14/25  0414 03/13/25  0501   WBC 10*3/mm3 11.94* 4.76 4.91 5.75   HEMOGLOBIN g/dL 11.0* 8.5* 8.7* 9.0*   PLATELETS 10*3/mm3 260 213 205 220     Results from last 7 days   Lab Units 03/16/25  0301 03/15/25  0409 03/14/25  0414 03/13/25  0501 03/12/25  0518 03/11/25  0500 03/10/25  1854   SODIUM mmol/L 139 134* 139   < > 137   < > 133*   POTASSIUM mmol/L 4.1 4.7 4.4   < > 4.7   < > 5.2   CHLORIDE mmol/L 99 99 104   < > 104   < > 96*   CO2 mmol/L 22.0 " "25.0 26.0   < > 24.0   < > 29.3*   BUN mg/dL 43* 50* 35*   < > 37*   < > 48*   CREATININE mg/dL 2.96* 3.56* 2.81*   < > 2.82*   < > 3.78*   GLUCOSE mg/dL 219* 115* 156*   < > 96   < > 332*   ALBUMIN g/dL  --   --   --   --  3.3*  --  3.5   BILIRUBIN mg/dL  --   --   --   --   --   --  0.4   ALK PHOS U/L  --   --   --   --   --   --  104   AST (SGOT) U/L  --   --   --   --   --   --  17   ALT (SGPT) U/L  --   --   --   --   --   --  16    < > = values in this interval not displayed.   Estimated Creatinine Clearance: 22.1 mL/min (A) (by C-G formula based on SCr of 2.96 mg/dL (H)).  Results from last 7 days   Lab Units 03/11/25  0500 03/10/25  2009 03/10/25  1854   CK TOTAL U/L 77  --   --    HSTROP T ng/L  --  103* 103*   PROBNP pg/mL  --   --  7,205.0*     Results from last 7 days   Lab Units 03/16/25  0420 03/16/25  0301   PROCALCITONIN ng/mL  --  0.29*   LACTATE mmol/L 1.9  --      No results found for: \"STREPPNEUAG\", \"LEGANTIGENUR\"        NIH Stroke Scale:   na                                                         Please refer to full rapid documentation on summary page under Index / Code Timeline  "

## 2025-03-16 NOTE — PLAN OF CARE
Goal Outcome Evaluation:            Transferred to CCU for hypotension and afib.  Wife updated over the phone.  Imaging ordered.  Antibiotics started for PNA.  Diet approved by Dr. Juan.  Dr. Munroe updated on EKG showing junctional tach.  Dr. Munroe updated on hemodynamic stability with this rhythm.  Current rhythm SR with bbb and pvc's.  BP stable with midodrine.

## 2025-03-16 NOTE — PROGRESS NOTES
Name: Carlito Mo ADMIT: 3/10/2025   : 1955  PCP: Belle Simons APRN    MRN: [id number] LOS: 0 days   AGE/SEX: 69 y.o. male  ROOM: [facility]   Subjective   Chief Complaint   Patient presents with    Weakness - Generalized     HD yesterday  N/V overnight  Denies abd pain this morning  Had episode of hypotension this morning and elevated HR             Objective   Vital Signs  Temp:  [98.1 °F (36.7 °C)-99.1 °F (37.3 °C)] 98.2 °F (36.8 °C)  Heart Rate:  [] 111  Resp:  [16-18] 18  BP: ()/(42-77) 86/45  SpO2:  [98 %-99 %] 98 %  on   ;   Device (Oxygen Therapy): room air  Body mass index is 18.85 kg/m².    Physical Exam  Constitutional:       Appearance: He is ill-appearing (chroncially).   HENT:      Head: Normocephalic and atraumatic.   Eyes:      General: No scleral icterus.  Cardiovascular:      Rate and Rhythm: Tachycardia present.   Pulmonary:      Effort: Pulmonary effort is normal. No respiratory distress.   Abdominal:      General: There is no distension.      Palpations: Abdomen is soft.      Tenderness: There is no abdominal tenderness.   Musculoskeletal:      Cervical back: Neck supple.     Chronically ill  No abd pain on exam    Results Review:       I reviewed the patient's new clinical results.  Results from last 7 days   Lab Units 25  1151 25  0301 03/15/25  0409 25  0414   WBC 10*3/mm3 10.70 11.94* 4.76 4.91   HEMOGLOBIN g/dL 9.4* 11.0* 8.5* 8.7*   PLATELETS 10*3/mm3 233 260 213 205     Results from last 7 days   Lab Units 25  1138 25  0301 03/15/25  0409 25  0414   SODIUM mmol/L 138 139 134* 139   POTASSIUM mmol/L 4.1  4.1 4.1 4.7 4.4   CHLORIDE mmol/L 101 99 99 104   CO2 mmol/L 22.6 22.0 25.0 26.0   BUN mg/dL 51* 43* 50* 35*   CREATININE mg/dL 3.51* 2.96* 3.56* 2.81*   GLUCOSE mg/dL 140* 219* 115* 156*   Estimated Creatinine Clearance: 17.7 mL/min (A) (by C-G formula based on SCr of 3.51 mg/dL (H)).  Results from last 7 days   Lab Units  "03/16/25  1138 03/12/25  0518 03/10/25  1854   ALBUMIN g/dL 3.3* 3.3* 3.5   BILIRUBIN mg/dL 0.3  --  0.4   ALK PHOS U/L 104  --  104   AST (SGOT) U/L 18  --  17   ALT (SGPT) U/L 17  --  16     Results from last 7 days   Lab Units 03/16/25  1138 03/16/25  0301 03/15/25  0409 03/14/25  0414 03/13/25  0501 03/12/25  0518 03/11/25  0500 03/10/25  1854   CALCIUM mg/dL 8.4* 9.0 8.2* 8.2* 8.5* 8.5*   < > 9.4   ALBUMIN g/dL 3.3*  --   --   --   --  3.3*  --  3.5   MAGNESIUM mg/dL 1.8  --   --  1.9  --   --   --   --    PHOSPHORUS mg/dL  --   --   --  3.2 3.6 3.2  --   --     < > = values in this interval not displayed.     Results from last 7 days   Lab Units 03/16/25  1151 03/16/25  1138 03/16/25  0420 03/16/25  0301   PROCALCITONIN ng/mL  --  0.81*  --  0.29*   LACTATE mmol/L 1.5  --  1.9  --        Coag     HbA1C   Lab Results   Component Value Date    HGBA1C 7.10 (H) 03/10/2025    HGBA1C 8 (H) 02/19/2025    HGBA1C 8.2 (H) 01/08/2025     Infection   Results from last 7 days   Lab Units 03/16/25  1138 03/16/25  0301   PROCALCITONIN ng/mL 0.81* 0.29*     Radiology(recent) XR Abdomen KUB  Result Date: 3/16/2025  Mildly prominent loop of small bowel within the left upper quadrant could reflect some localized ileus. However, there is no clear evidence of small bowel obstruction.  This report was finalized on 3/16/2025 5:08 AM by Dr. Alice Allen M.D on Workstation: [id number]      XR Chest 1 View  Result Date: 3/16/2025  No acute findings.  This report was finalized on 3/16/2025 5:05 AM by Dr. Alice Allen M.D on Workstation: [id number]      No results found for: \"TROPONINT\", \"TROPONINI\", \"BNP\"  No components found for: \"TSH;2\"    apixaban, 2.5 mg, Oral, BID  aspirin, 325 mg, Oral, Daily  atorvastatin, 80 mg, Oral, Nightly  insulin lispro, 2-7 Units, Subcutaneous, 4x Daily AC & at Bedtime  levothyroxine, 75 mcg, Oral, Q AM  midodrine, 10 mg, Oral, TID AC  midodrine, 5 mg, Oral, Once  pantoprazole, 40 mg, Oral, " BID AC  sodium chloride, 10 mL, Intravenous, Q12H  tamsulosin, 0.4 mg, Oral, Daily      phenylephrine, 0.5-3 mcg/kg/min    NPO Diet NPO Type: Sips with Meds      Assessment & Plan      Active Hospital Problems    Diagnosis  POA    **Generalized weakness [R53.1]  Yes    Ileus [K56.7]  Unknown    Chronic HFrEF (heart failure with reduced ejection fraction) [I50.22]  Yes    ESRD (end stage renal disease) [N18.6]  Yes    History of stroke [Z86.73]  Not Applicable    Paroxysmal atrial fibrillation [I48.0]  Yes    Embolic stroke [I63.9]  Yes    Essential hypertension [I10]  Yes    Coronary artery disease involving native coronary artery of native heart without angina pectoris [I25.10]  Yes    Acquired hypothyroidism [E03.9]  Yes    Type 2 diabetes mellitus, with long-term current use of insulin [E11.9, Z79.4]  Not Applicable      Resolved Hospital Problems   No resolved problems to display.       Patient is a 69 y.o. male ESRD on dialysis, CAD, Afib and h/o stroke on eliquis who presented with increasing weakness and a fall at home , right hip pain- XR negative - no further complaints of pain.     Had N/V overnight. Possible ileus and Surgery consulted    Today with Aflutter with RVR and hypotension. Had ordered IV bolus as well as IV digoxin x 1 along with midodrine. Cardiology has seen in consultation. Rapid response called and discussion with specialists recommended transfer to the ICU in case needs pressor agents especially in light of low BP, high HR, and ESRD status limited fluids. Labs look ok including normal WBC count and lactate so doesn't seem infectious at least currently. Blood cultures drawn this morning. Cdiff negative.     Thanks to multiple specialists seeing this patient.     GURJIT RN  GURJIT Hogue MD  [facility]  03/16/25  12:52 EDT

## 2025-03-16 NOTE — NURSING NOTE
RN contacted [facility] at 0947 requesting status update on pending precert for placement at d/c, and request patient class transition from observation status to inpatient status; voicemail completed.    Will wait for returned call and update attending provider, MD Mykel.

## 2025-03-16 NOTE — CONSULTS
[facility]    Patient Name: Carlito Mo  :1955  69 y.o.  LOS: 0  Encounter Provider: David Munroe MD      Patient Care Team:  Belle Simons APRN as PCP - General (Family Medicine)  Amber Murguia MD as Consulting Physician (Cardiology)  Sera La BRYAN as Pharmacist  Seth Ladd MD as Surgeon (General Surgery)  Kim Hoyos MD PhD as Consulting Physician (Hematology and Oncology)  Jerome Diallo MD as Referring Physician (Family Medicine)  Jose Enrique Louie MD as Consulting Physician (Gastroenterology)  Nima Huddleston MD as Consulting Physician (Nephrology)    Chief Complaint/Consult question: Atrial flutter, hypotension     PMH/HPI: This is a 69 year-old male with a past medical history of coronary artery disease with MI and CABG in , hypertension, hyperlipidemia, type 2 diabetes, anemia, chronic kidney disease, history of CVAs in , basal cell carcinoma.    Echocardiogram in  showed mild LV hypertrophy, mild left atrial enlargement with negative bubble, normal LV systolic fx, and EF of 68%. NEISHA showed a lower EF of 45% with focal wall motion abnormalities primarliy in the septum and anterior wall, small patent foramen ovale, no significant valvular disease, or cardiac emboli source minus the small PFO. 24-hour Holter monitor showed a couple short bursts of SVT, but no sustained arrhythmia.  72-hour monitor in 2018 after a hospital admission showed predominantly normal sinus rhythm, rare PACs, 9 episode of SVT peaking at 169 bpm, rare PVCs, and no VT.  A week later an echocardiogram calculated EF at 62.9, and normal LV function, grade 1 dysfunction and mild MVR.    He was hospitalized in May 2020 and diagnosed with new areas of cerebral infarction felt to be embolic.  Evidence of some chronic microhemorrhages.  Repeat echo in  showed progressive LV function decline with current EF of 31 to 35%, grade 3 LV dysfunction now,  moderate to severe global hypokinesis.  Mildly dilated RV cavity, moderately reduced RV fx, milld MVR, small pericardial effusion (< 1cm), and left pleural effusion.  He received IV Lasix for acute heart failure and pulmonary edema.  He was transitioned to oral diuretics and medications were adjusted for new cardiomyopathy.  He was started on spironolactone and hydralazine was stopped.  He was switched to carvedilol and lisinopril was increased.  Nephrology evaluated him and felt his baseline creatinine was 2.3-2.6.  His creatinine was 2.0 at discharge with normal potassium. He was discharged on Eliquis in addition to aspirin.    He was in the ER in December 2021 with nausea and vomiting.  He was not taking his diuretic and his blood pressure was 178/102.  Chest x-ray showed pulmonary vascular congestion with bilateral pleural effusions.  Nephrology and cardiology were consulted.    In March 2023 he was hospitalized with acute on chronic combined congestive heart failure and had an ejection fraction of 40 to 45%.  He was felt not to be a candidate for cath due to advanced kidney disease and was treated with goal-directed medical therapy with carvedilol.  He was not on ACE/ARB or SGLT2, or Aldactone due to chronic kidney disease which was managed by nephrology.  He had a large right pleural effusion which required thoracentesis on 3/8/2023 and had 2 L drained.  He was treated with apixaban and aspirin due to a history of CVA.     He was readmitted in May 2023 with progressive shortness of breath and increased lower extremity edema.  He reported noncompliance with his medications.  Chest x-ray showed pulmonary edema.  He was treated with IV diuretics.  Nephrology was consulted to assist with management.  He had issues with hyperkalemia.  Carvedilol was increased and he was treated with Demadex.  He was ultimately switched from furosemide to torsemide 100 mg daily.    Interval History: He presented to the emergency  department on 3/10/2025 after mechanical fall.  He tripped and fell on his back side.  He complained of right hip pain.  Workup in the ER was fairly unrevealing.  Labs consistent with end-stage renal disease.  X-ray of the hip was negative.  He was admitted to [facility] for further management.  He received hemodialysis while here and his mental status improved.  He was ready for discharge yesterday, however there was issues with placement. This morning a rapid response was called for rapid atrial flutter with associated hypotension.  His lowest blood pressure was 68/42.  He received 125 mcg of digoxin and a 500 cc bolus.  He was transferred to critical care for closer monitoring and possible need for vasopressors.  Cardiology has been consulted for atrial flutter.     At the time of interview, patient appears to be ill and somewhat lethargic.  Denies active chest pain or palpitation.  Endorses dizziness in addition to stable dyspnea.        ECHO 94/2024    Left ventricular systolic function is mildly decreased. Calculated left ventricular EF = 44.7%. The following left ventricular wall segments are hypokinetic: apical inferior, mid inferior and apical septal.    Left ventricular diastolic function is consistent with (grade Ia w/high LAP) impaired relaxation.    There is a focal area of thickening and calcification on the anterior mitral valve without any significant stenosis or regurgitation.    Saline test results are negative for right to left atrial level shunt.    Stress Test 4/24/2019  Myocardial perfusion imaging indicates a medium-sized infarct located in the apex/periapical areas with no significant ischemia noted.  Left ventricular ejection fraction is mildly reduced (Calculated EF = 44%).    Objective   Vital Signs  Temp:  [98.1 °F (36.7 °C)-99.1 °F (37.3 °C)] 98.2 °F (36.8 °C)  Heart Rate:  [] 111  Resp:  [16-18] 18  BP: ()/(42-77) 86/45    Intake/Output Summary (Last 24 hours) at 3/16/2025  "1217  Last data filed at 3/15/2025 1400  Gross per 24 hour   Intake 480 ml   Output --   Net 480 ml     Flowsheet Rows      Flowsheet Row First Filed Value   Admission Height 182.9 cm (72.01\") Documented at 03/10/2025 1718   Admission Weight 66.2 kg (146 lb) Documented at 03/10/2025 1718              Vitals and nursing note reviewed.   Constitutional:       Appearance: Chronically ill-appearing and acutely ill-appearing.   Pulmonary:      Effort: Pulmonary effort is normal.   Cardiovascular:      PMI at left midclavicular line. Tachycardia present. Irregular rhythm.      Murmurs: There is no murmur.   Edema:     Peripheral edema absent.   Abdominal:      General: Bowel sounds are normal. There is no distension.   Neurological:      Mental Status: Alert and oriented to person, place, and time. Lethargic.           Pertinent Test Results:  Results from last 7 days   Lab Units 03/16/25  0301 03/15/25  0409 03/14/25  0414 03/13/25  0501 03/12/25  0518 03/11/25  0500 03/10/25  1854   SODIUM mmol/L 139 134* 139 137 137 137 133*   POTASSIUM mmol/L 4.1 4.7 4.4 5.1 4.7 5.0 5.2   CHLORIDE mmol/L 99 99 104 102 104 100 96*   CO2 mmol/L 22.0 25.0 26.0 23.4 24.0 27.6 29.3*   BUN mg/dL 43* 50* 35* 48* 37* 48* 48*   CREATININE mg/dL 2.96* 3.56* 2.81* 3.67* 2.82* 3.30* 3.78*   GLUCOSE mg/dL 219* 115* 156* 113* 96 183* 332*   CALCIUM mg/dL 9.0 8.2* 8.2* 8.5* 8.5* 8.6 9.4   AST (SGOT) U/L  --   --   --   --   --   --  17   ALT (SGPT) U/L  --   --   --   --   --   --  16     Results from last 7 days   Lab Units 03/11/25  0500 03/10/25  2009 03/10/25  1854   CK TOTAL U/L 77  --   --    HSTROP T ng/L  --  103* 103*     Results from last 7 days   Lab Units 03/16/25  1151 03/16/25  0301 03/15/25  0409 03/14/25  0414 03/13/25  0501 03/12/25  0518 03/11/25  0500   WBC 10*3/mm3 10.70 11.94* 4.76 4.91 5.75 6.18 6.55   HEMOGLOBIN g/dL 9.4* 11.0* 8.5* 8.7* 9.0* 8.9* 9.3*   HEMATOCRIT % 31.3* 36.4* 26.5* 28.5* 28.9* 29.3* 30.5*   PLATELETS " "10*3/mm3 233 260 213 205 220 209 213         Results from last 7 days   Lab Units 03/14/25  0414   MAGNESIUM mg/dL 1.9           Invalid input(s): \"LDLCALC\"  Results from last 7 days   Lab Units 03/10/25  1854   PROBNP pg/mL 7,205.0*     Results from last 7 days   Lab Units 03/11/25  0500   TSH uIU/mL 2.810                     Medication Review:   apixaban, 2.5 mg, Oral, BID  aspirin, 325 mg, Oral, Daily  atorvastatin, 80 mg, Oral, Nightly  insulin lispro, 2-7 Units, Subcutaneous, 4x Daily AC & at Bedtime  levothyroxine, 75 mcg, Oral, Q AM  midodrine, 10 mg, Oral, TID AC  midodrine, 5 mg, Oral, Once  pantoprazole, 40 mg, Oral, BID AC  sodium chloride, 500 mL, Intravenous, Once  sodium chloride, 10 mL, Intravenous, Q12H  tamsulosin, 0.4 mg, Oral, Daily         phenylephrine, 0.5-3 mcg/kg/min        Assessment & Plan     Active Hospital Problems    Diagnosis  POA    **Generalized weakness [R53.1]  Yes    Ileus [K56.7]  Unknown    Chronic HFrEF (heart failure with reduced ejection fraction) [I50.22]  Yes    ESRD (end stage renal disease) [N18.6]  Yes    History of stroke [Z86.73]  Not Applicable    Paroxysmal atrial fibrillation [I48.0]  Yes    Embolic stroke [I63.9]  Yes    Essential hypertension [I10]  Yes    Coronary artery disease involving native coronary artery of native heart without angina pectoris [I25.10]  Yes    Acquired hypothyroidism [E03.9]  Yes    Type 2 diabetes mellitus, with long-term current use of insulin [E11.9, Z79.4]  Not Applicable      Resolved Hospital Problems   No resolved problems to display.        Shock: Hypotensive during HD today.  Etiology unclear but may be multifactorial including hypovolemia and fluid shift during HD, defer infectious workup to primary service and ICU.  Agree with pressor support, okay to start with phenylephrine to reduce proarrhythmic effect.  Already on midodrine for chronic low BP.  A-fib with RVR: Known diagnosis of A-fib, heart rate 110-120s currently.  " Unlikely to be causing hemodynamic instability at this level, he did get IV digoxin 125 x 1 by hospitalist, would be very cautious about repeat dosing given ESRD.  Unable to tolerate beta-blocker due to shock and pressor requirement as above.  May consider amiodarone if needed for better rate control.  [id number] score 6, on Eliquis 2.5 bid at home.  History of CVA: On Eliquis as above in addition to aspirin, see below regarding other risk factor modification.  CAD/MI: History of CABG in 2001.  No present indication for further ischemic evaluation especially in the setting of critical illness.  HFrEF/ischemic cardiomyopathy: Known mildly reduced EF in the 40s with apical wall motion abnormality on multiple prior echoes.  Will repeat limited echo now given decompensation.  ESRD on HD: Management per primary team and other specialists.  Hypertension  Hyperlipidemia: Continue home atorvastatin 80 daily.  DM2: Management per primary team and other specialists, insulin-dependent.  Hypothyroidism  Goals of care: Encourage engaging family and patient regarding long-term goals of care if no rapid improvement is seen.    Plan of care discussed with RN and intensivist Dr. Carolina.    High risk due to age, frailty, multiple comorbodities predisposing to major adverse events including myocardial infarction, stroke, hospitalization, and multi-agent drug therapy requiring intensive monitoring for toxicity (A-fib with RVR, vasopressor requirement).    David Munroe MD  [facility]  03/16/25  11:11 EDT

## 2025-03-16 NOTE — CONSULTS
[facility]   Consult Note    Patient Name: Carlito Mo  : 1955  MRN: [id number]  Primary Care Physician:  Belle Simons APRN  Referring Physician: Amanda Martins MD  Date of admission: 3/10/2025    Inpatient General Surgery Consult  Consult performed by: Julio C Juan Jr., MD  Consult ordered by: Indira Andrea APRN        Subjective   Subjective     Reason for Consult/ Chief Complaint: Ileus    History of present illness  Carlito Mo is a pleasant 69 y.o. male with multiple medical problems including end-stage renal disease on hemodialysis, coronary disease, atrial fibrillation, hypertension, congestive heart failure, cerebrovascular disease with previous CVA, COPD, type 2 diabetes mellitus, and dyslipidemia who presented to the emergency room with generalized weakness and fall.  He has been managed with hemodialysis and conservative management.  He has generally been improving with plans to transfer to a skilled nursing facility.    He developed nausea and vomiting this morning at breakfast which was a new symptom.  Prior to that, he had been eating fine and having regular bowel function.  He was not hungry for lunch.  He does not have any abdominal pain.  He has not had any further nausea or vomiting since the episode earlier today.  He also developed some hypotension with a rapid heart rate and was moved to the coronary care unit.  Abdominal x-rays were performed that show a nonspecific bowel gas pattern with 1 isolated loop of small intestines that is somewhat dilated.  Concern was raised over a possible ileus.  I reviewed the images.    Review of Systems   Constitutional:  Negative for chills and fever.   Gastrointestinal:  Positive for nausea and vomiting. Negative for abdominal distention, abdominal pain, constipation and diarrhea.   The patient is a poor historian but seemed to answer the review of systems accurately.    Personal History     Past Medical History:   Diagnosis Date     Acquired hypothyroidism     Acute sinusitis     SYLVAIN (acute kidney injury)     on CKD    Anemia     Arthritis     Atrial fibrillation     CAD (coronary artery disease)     Chronic combined systolic and diastolic congestive heart failure     COPD (chronic obstructive pulmonary disease)     Depression     Diabetic neuropathy 04/11/2022    Diabetes mellitus due to underlying condition    Esophagitis 06/10/2020    Added automatically from request for surgery [id number]      ESRD (end stage renal disease) 12/01/2023    Frequent PVCs     Generalized weakness     GERD (gastroesophageal reflux disease)     Helicobacter pylori gastritis 06/04/2020    Hyperlipidemia     Hypertension     Hypertensive encephalopathy     Pleural effusion     Pneumonia     Poor historian     RBBB (right bundle branch block)     Stroke     POOR VISION    TIA (transient ischemic attack)     Type 2 diabetes mellitus     Urinary retention     Vitamin D deficiency        Past Surgical History:   Procedure Laterality Date    APPENDECTOMY N/A 02/14/2016    Dr. Alexey Dodson    ARTERIOVENOUS FISTULA/SHUNT SURGERY Right 06/03/2022    Procedure: RIGHT FOREARM ARTERIOVENOUS FISTULA PLACEMENT RADIOCEPHALIC WITH CEPHALIC VEIN TRANSPOSITION;  Surgeon: Eliseo Willis MD;  Location: [facility] MAIN OR;  Service: Vascular;  Laterality: Right;    COLONOSCOPY      over 20 years ago.  no polyps     COLONOSCOPY N/A 09/18/2018    Procedure: COLONOSCOPY;  Surgeon: Jose Enrique Louie MD;  Location: [facility] ENDOSCOPY;  Service: Gastroenterology    COLONOSCOPY N/A 09/19/2018    IH, EH, polyps (TAs w/low grade dysplasia)    COLONOSCOPY N/A 06/01/2020    Procedure: COLONOSCOPY;  Surgeon: Jose Enrique Louie MD;  Location: [facility] ENDOSCOPY;  Service: Gastroenterology;  Laterality: N/A;  Pre op-Anemia, Melena, History of Polyps  Post op-To Cecum/TI, Polyp, Poor Prep    CORONARY ARTERY BYPASS GRAFT  11/2001    ENDOSCOPY N/A 05/29/2020    Procedure: ESOPHAGOGASTRODUODENOSCOPY  with biopsies;  Surgeon: Amanda Lovelace MD;  Location:  [facility];  Service: Gastroenterology;  Laterality: N/A;  pre-anemia, dark stools  post-esophagitis, hiatal hernia    ENDOSCOPY N/A 09/15/2020    Procedure: ESOPHAGOGASTRODUODENOSCOPY WITH BIOPSIES;  Surgeon: Jose Enrique Louie MD;  Location:  [facility];  Service: Gastroenterology;  Laterality: N/A;  pre: history of melena and esophagitis  post: mild esophagitis and gastritis, small hiatal hernia    ENDOSCOPY N/A 12/4/2023    Procedure: ESOPHAGOGASTRODUODENOSCOPY with bx;  Surgeon: Quoc Elmore MD;  Location:  [facility];  Service: Gastroenterology;  Laterality: N/A;  pre: Coffee-ground emesis  post: hiatal hernia, esophagitis    HERNIA REPAIR Left 12/05/2019    THORACENTESIS      TONSILLECTOMY      VASECTOMY         Family History: family history includes Alcohol abuse in his brother and father; Autism in his son; Cancer in his father; Cirrhosis in his brother; Diabetes in his mother and son; Heart disease in his father; Hypertension in his mother; Kidney failure in his son; Liver cancer (age of onset: 45) in his brother; Macular degeneration in his mother. Otherwise pertinent FHx was reviewed and not pertinent to current issue.    Social History:  reports that he has never smoked. He has never used smokeless tobacco. He reports that he does not drink alcohol and does not use drugs.    Home Medications:   apixaban, aspirin, atorvastatin, budesonide-formoterol, famotidine, insulin lispro, ipratropium-albuterol, levothyroxine, loperamide, pantoprazole, tamsulosin, and vitamin D3    Allergies:  Allergies   Allergen Reactions    Prozac [Fluoxetine Hcl] Other (See Comments)     shaking       Objective    Objective     Vitals:  Temp:  [98.1 °F (36.7 °C)-99.1 °F (37.3 °C)] 98.2 °F (36.8 °C)  Heart Rate:  [] 111  Resp:  [16-18] 18  BP: ()/(42-77) 86/45    Physical Exam  Constitutional:       Appearance: He is not ill-appearing  or toxic-appearing.   HENT:      Head: Normocephalic and atraumatic.   Eyes:      General: No scleral icterus.     Extraocular Movements: Extraocular movements intact.   Pulmonary:      Effort: Pulmonary effort is normal. No respiratory distress.   Abdominal:      General: Abdomen is flat. There is no distension.      Palpations: Abdomen is soft.      Tenderness: There is no abdominal tenderness.   Neurological:      Mental Status: He is alert.   Psychiatric:         Behavior: Behavior is cooperative.         Result Review    Result Review:  I have personally reviewed the results from the time of this admission to 3/16/2025 12:15 EDT and agree with these findings:  [x]  Laboratory list / accordion  []  Microbiology  [x]  Radiology  []  EKG/Telemetry   []  Cardiology/Vascular   []  Pathology  [x]  Old records  []  Other:      Assessment & Plan   Assessment / Plan     Brief Patient Summary with assessment and plan:  Carlito Mo is a 69 y.o. male who has multiple medical problems and developed an episode of nausea and vomiting after breakfast this morning.  Abdominal x-rays suggested a focal ileus.    1.  Possible ileus: The patient had an episode of nausea and vomiting this morning but also had some hypotension and rapid heart rate.  His abdominal x-rays show a single loop of small bowel that appears prominent.  I doubt he has a significant ileus but likely has some degree of low-grade ileus related to his current illness.  His abdominal exam is completely benign and he has been having bowel function.  He is currently n.p.o. and is scheduled to have an echocardiogram.  I will not start a diet but once testing has been completed, a diet can be restarted.  There is no need for surgical intervention at this time.  General surgery will follow to make sure he is able to tolerate a diet.        Julio C Juan Jr., MD

## 2025-03-16 NOTE — PROGRESS NOTES
"    [facility] Progress Note      Patient Name: Carlito Mo  : 1955  MRN: [id number]  Primary Care Physician:  Belle Simons APRN  Date of admission: 3/10/2025    Subjective     Interval History:   F/U ESRD    Review of Systems:   Dialyzed yesterday and tolerated treatment well, 2L removed and /76 coming off treatment  BP was stable rest of day but this AM became acutely hypotensive 60s to 70s systolic and rapid response called  He vomited last night and this AM  He was given  cc bolus and moved to ICU  CXR: NEG  AXR: possible ileus   BP 88/47 currently w/o pressor  He's on room air  States \"it was the rice\" that made him sick yesterday    Objective     Vitals:   Temp:  [98.1 °F (36.7 °C)-99.1 °F (37.3 °C)] 98.2 °F (36.8 °C)  Heart Rate:  [] 111  Resp:  [16-18] 18  BP: ()/(42-77) 86/45    Intake/Output Summary (Last 24 hours) at 3/16/2025 1156  Last data filed at 3/15/2025 1400  Gross per 24 hour   Intake 480 ml   Output --   Net 480 ml       Physical Exam:    General Appearance: frail WM comfortable on RA; speech/mentation slow but at BL  Neck: supple, no JVD  Lungs: CTA bilat no rales  Heart: RRR, normal S1 and S2  Abdomen: soft, nontender, nondistended  Extremities: no edema, cyanosis or clubbing      Scheduled Meds:     apixaban, 2.5 mg, Oral, BID  aspirin, 325 mg, Oral, Daily  atorvastatin, 80 mg, Oral, Nightly  insulin lispro, 2-7 Units, Subcutaneous, 4x Daily AC & at Bedtime  levothyroxine, 75 mcg, Oral, Q AM  pantoprazole, 40 mg, Oral, BID AC  sodium chloride, 500 mL, Intravenous, Once  sodium chloride, 10 mL, Intravenous, Q12H  tamsulosin, 0.4 mg, Oral, Daily      IV Meds:   phenylephrine, 0.5-3 mcg/kg/min        Results Reviewed:   I have personally reviewed the results from the time of this admission to 3/16/2025 11:56 EDT     Results from last 7 days   Lab Units 25  0301 03/15/25  0409 25  0414 25  0500 03/10/25  1854 "   SODIUM mmol/L 139 134* 139   < > 133*   POTASSIUM mmol/L 4.1 4.7 4.4   < > 5.2   CHLORIDE mmol/L 99 99 104   < > 96*   CO2 mmol/L 22.0 25.0 26.0   < > 29.3*   BUN mg/dL 43* 50* 35*   < > 48*   CREATININE mg/dL 2.96* 3.56* 2.81*   < > 3.78*   CALCIUM mg/dL 9.0 8.2* 8.2*   < > 9.4   BILIRUBIN mg/dL  --   --   --   --  0.4   ALK PHOS U/L  --   --   --   --  104   ALT (SGPT) U/L  --   --   --   --  16   AST (SGOT) U/L  --   --   --   --  17   GLUCOSE mg/dL 219* 115* 156*   < > 332*    < > = values in this interval not displayed.     Estimated Creatinine Clearance: 21.1 mL/min (A) (by C-G formula based on SCr of 2.96 mg/dL (H)).  Results from last 7 days   Lab Units 03/14/25  0414 03/13/25  0501 03/12/25  0518   MAGNESIUM mg/dL 1.9  --   --    PHOSPHORUS mg/dL 3.2 3.6 3.2         Results from last 7 days   Lab Units 03/16/25  0301 03/15/25  0409 03/14/25  0414 03/13/25  0501 03/12/25  0518   WBC 10*3/mm3 11.94* 4.76 4.91 5.75 6.18   HEMOGLOBIN g/dL 11.0* 8.5* 8.7* 9.0* 8.9*   PLATELETS 10*3/mm3 260 213 205 220 209           Assessment / Plan     ASSESSMENT:  ESRD - HD MWF via [facility]UE [facility] - TTS this week (dialyzed yesterday) as will be his schedule at rehab ([facility]).  While we did remove 2L of fluid during dialysis yesterday, his BP was stable coming off treatment and normotensive rest of day til this AM.    Hypotension - SBP 60s to 70s this AM accompanied by n/v.  Vasovagal episode?  Cautious with IVF admin given ESRD - gave 500 cc bolus total.  WBC up to 12K, will explore infectious causes.  CXR NEG  S/P fall with generalized weakness - plan is rehab   DM2, mgmt per primary team  AF/RVR - CARD to see   BPH, on flomax   Anemia of CKD, lab error re: initial hgb (8.5 -> 11); makes sense 9.4 on recheck  Questionable ileus - d/w surgery, not too impressed with AXR fidnings    PLAN:  Repeat midodrine 5mg PO x1 (given 5mg during rapid response) and schedule this 10 TID for now  Pressor if needed to keep SBP > 90  Lactate and  echo pending  Next HD TUES tentatively       Jose Enrique Montelongo MD  03/16/25  11:56 EDT    [facility]""  [phone]

## 2025-03-17 LAB
ANION GAP SERPL CALCULATED.3IONS-SCNC: 16 MMOL/L (ref 5–15)
BASOPHILS # BLD AUTO: 0 10*3/MM3 (ref 0–0.2)
BASOPHILS NFR BLD AUTO: 0 % (ref 0–1.5)
BUN SERPL-MCNC: 58 MG/DL (ref 8–23)
BUN/CREAT SERPL: 13.4 (ref 7–25)
CALCIUM SPEC-SCNC: 8.2 MG/DL (ref 8.6–10.5)
CHLORIDE SERPL-SCNC: 100 MMOL/L (ref 98–107)
CO2 SERPL-SCNC: 20 MMOL/L (ref 22–29)
CREAT SERPL-MCNC: 4.34 MG/DL (ref 0.76–1.27)
DEPRECATED RDW RBC AUTO: 44.9 FL (ref 37–54)
EGFRCR SERPLBLD CKD-EPI 2021: 14 ML/MIN/1.73
EOSINOPHIL # BLD AUTO: 0.06 10*3/MM3 (ref 0–0.4)
EOSINOPHIL NFR BLD AUTO: 1.3 % (ref 0.3–6.2)
ERYTHROCYTE [DISTWIDTH] IN BLOOD BY AUTOMATED COUNT: 14.5 % (ref 12.3–15.4)
GLUCOSE BLDC GLUCOMTR-MCNC: 133 MG/DL (ref 70–130)
GLUCOSE BLDC GLUCOMTR-MCNC: 140 MG/DL (ref 70–130)
GLUCOSE BLDC GLUCOMTR-MCNC: 180 MG/DL (ref 70–130)
GLUCOSE BLDC GLUCOMTR-MCNC: 188 MG/DL (ref 70–130)
GLUCOSE SERPL-MCNC: 141 MG/DL (ref 65–99)
HCT VFR BLD AUTO: 27 % (ref 37.5–51)
HGB BLD-MCNC: 8.7 G/DL (ref 13–17.7)
IMM GRANULOCYTES # BLD AUTO: 0.03 10*3/MM3 (ref 0–0.05)
IMM GRANULOCYTES NFR BLD AUTO: 0.6 % (ref 0–0.5)
LYMPHOCYTES # BLD AUTO: 0.41 10*3/MM3 (ref 0.7–3.1)
LYMPHOCYTES NFR BLD AUTO: 8.8 % (ref 19.6–45.3)
MCH RBC QN AUTO: 27.3 PG (ref 26.6–33)
MCHC RBC AUTO-ENTMCNC: 32.2 G/DL (ref 31.5–35.7)
MCV RBC AUTO: 84.6 FL (ref 79–97)
MONOCYTES # BLD AUTO: 0.57 10*3/MM3 (ref 0.1–0.9)
MONOCYTES NFR BLD AUTO: 12.3 % (ref 5–12)
NEUTROPHILS NFR BLD AUTO: 3.58 10*3/MM3 (ref 1.7–7)
NEUTROPHILS NFR BLD AUTO: 77 % (ref 42.7–76)
NRBC BLD AUTO-RTO: 0 /100 WBC (ref 0–0.2)
PLATELET # BLD AUTO: 216 10*3/MM3 (ref 140–450)
PMV BLD AUTO: 10.1 FL (ref 6–12)
POTASSIUM SERPL-SCNC: 4 MMOL/L (ref 3.5–5.2)
RBC # BLD AUTO: 3.19 10*6/MM3 (ref 4.14–5.8)
SODIUM SERPL-SCNC: 136 MMOL/L (ref 136–145)
WBC NRBC COR # BLD AUTO: 4.65 10*3/MM3 (ref 3.4–10.8)

## 2025-03-17 PROCEDURE — 25010000002 AMPICILLIN-SULBACTAM PER 1.5 G: Performed by: INTERNAL MEDICINE

## 2025-03-17 PROCEDURE — 97168 OT RE-EVAL EST PLAN CARE: CPT

## 2025-03-17 PROCEDURE — 80048 BASIC METABOLIC PNL TOTAL CA: CPT | Performed by: NURSE PRACTITIONER

## 2025-03-17 PROCEDURE — 63710000001 INSULIN LISPRO (HUMAN) PER 5 UNITS: Performed by: NURSE PRACTITIONER

## 2025-03-17 PROCEDURE — 97110 THERAPEUTIC EXERCISES: CPT

## 2025-03-17 PROCEDURE — 85025 COMPLETE CBC W/AUTO DIFF WBC: CPT | Performed by: NURSE PRACTITIONER

## 2025-03-17 PROCEDURE — 99232 SBSQ HOSP IP/OBS MODERATE 35: CPT | Performed by: INTERNAL MEDICINE

## 2025-03-17 PROCEDURE — 82948 REAGENT STRIP/BLOOD GLUCOSE: CPT

## 2025-03-17 PROCEDURE — 97530 THERAPEUTIC ACTIVITIES: CPT

## 2025-03-17 PROCEDURE — 99231 SBSQ HOSP IP/OBS SF/LOW 25: CPT | Performed by: SURGERY

## 2025-03-17 RX ORDER — AMIODARONE HYDROCHLORIDE 200 MG/1
200 TABLET ORAL
Status: DISCONTINUED | OUTPATIENT
Start: 2025-03-17 | End: 2025-03-20 | Stop reason: HOSPADM

## 2025-03-17 RX ORDER — AMOXICILLIN 250 MG/1
500 CAPSULE ORAL EVERY 8 HOURS SCHEDULED
Status: DISCONTINUED | OUTPATIENT
Start: 2025-03-18 | End: 2025-03-18

## 2025-03-17 RX ORDER — ALBUMIN (HUMAN) 12.5 G/50ML
12.5 SOLUTION INTRAVENOUS AS NEEDED
Status: CANCELLED | OUTPATIENT
Start: 2025-03-18 | End: 2025-03-18

## 2025-03-17 RX ADMIN — SODIUM CHLORIDE 3 G: 9 INJECTION, SOLUTION INTRAVENOUS at 20:02

## 2025-03-17 RX ADMIN — Medication 10 ML: at 08:46

## 2025-03-17 RX ADMIN — TAMSULOSIN HYDROCHLORIDE 0.4 MG: 0.4 CAPSULE ORAL at 08:45

## 2025-03-17 RX ADMIN — AMIODARONE HYDROCHLORIDE 200 MG: 200 TABLET ORAL at 12:08

## 2025-03-17 RX ADMIN — PANTOPRAZOLE SODIUM 40 MG: 40 TABLET, DELAYED RELEASE ORAL at 08:45

## 2025-03-17 RX ADMIN — LEVOTHYROXINE SODIUM 75 MCG: 0.07 TABLET ORAL at 05:35

## 2025-03-17 RX ADMIN — Medication 0.5 MCG/KG/MIN: at 04:13

## 2025-03-17 RX ADMIN — INSULIN LISPRO 2 UNITS: 100 INJECTION, SOLUTION INTRAVENOUS; SUBCUTANEOUS at 13:14

## 2025-03-17 RX ADMIN — APIXABAN 2.5 MG: 2.5 TABLET, FILM COATED ORAL at 20:22

## 2025-03-17 RX ADMIN — MIDODRINE HYDROCHLORIDE 10 MG: 5 TABLET ORAL at 08:45

## 2025-03-17 RX ADMIN — PANTOPRAZOLE SODIUM 40 MG: 40 TABLET, DELAYED RELEASE ORAL at 20:02

## 2025-03-17 RX ADMIN — MUPIROCIN 1 APPLICATION: 20 OINTMENT TOPICAL at 20:22

## 2025-03-17 RX ADMIN — MIDODRINE HYDROCHLORIDE 10 MG: 5 TABLET ORAL at 12:08

## 2025-03-17 RX ADMIN — Medication 10 ML: at 20:22

## 2025-03-17 RX ADMIN — INSULIN LISPRO 2 UNITS: 100 INJECTION, SOLUTION INTRAVENOUS; SUBCUTANEOUS at 22:34

## 2025-03-17 RX ADMIN — SODIUM CHLORIDE 3 G: 9 INJECTION, SOLUTION INTRAVENOUS at 05:35

## 2025-03-17 RX ADMIN — ATORVASTATIN CALCIUM 80 MG: 80 TABLET, FILM COATED ORAL at 20:21

## 2025-03-17 RX ADMIN — APIXABAN 2.5 MG: 2.5 TABLET, FILM COATED ORAL at 08:45

## 2025-03-17 NOTE — CASE MANAGEMENT/SOCIAL WORK
Post-Acute Authorization Submission      Post Acute Pre-Cert Documentation  Request Submitted by Facility - Type:: Hospital  Post-Acute Authorization Type Submitted:: SNF  Date Post Acute Pre-Cert Inititated per Facility: 03/14/25  Date Post Acute Pre-Cert Completed: 03/17/25  Accepting Facility: [facility] Discharge Date Requested: 03/14/25  All Clinicals Submitted?: Yes  Had Accepting Facility at Time of Submission: Yes  Response Received from Insurance?: Denial  Action Taken by Requesting Facility:: P2P Not Completed  Response Communicated to::   Authorization Number:: DENIED [id number]  Post Acute Pre-Cert Initiated Comment: KIKA SANDERSON  # [phone]/FAX# [phone]              JAIME Archer

## 2025-03-17 NOTE — PLAN OF CARE
Goal Outcome Evaluation:  Plan of Care Reviewed With: patient           Outcome Evaluation: Pt was alert and oriented, motivated to do PT. Pt continues to have difficulty ambulating, pt was able to ambulate 30' with Shakeel, continues to have a thoracic kyphotic posture and tends to walk with a wide ADRIEL. Pt is at a high risk for falls due to balance deficits and lack of strength and endurance. Pt required Shakeel for STS. Pt will continue to benefit from skilled PT to continue to improve strength and endurance. Pt would benefit from rehab at d/c. PT will continue to follow.    Anticipated Discharge Disposition (PT): skilled nursing facility

## 2025-03-17 NOTE — CASE MANAGEMENT/SOCIAL WORK
Continued Stay Note  [facility]     Patient Name: Carlito Mo  MRN: [id number]  Today's Date: 3/17/2025    Admit Date: 3/10/2025    Plan: [facility] accepted and will need new precert   Discharge Plan       Row Name 03/17/25 1350       Plan    Plan [facility] accepted and will need new precert    Patient/Family in Agreement with Plan yes    Plan Comments patient was a rapid call over the weekend and transferred to CCU. Received message from [facility] authorizations that orignal precert went to peer to peer. Informed [facility] auth that patient had rapid call over the weekend and asked for precert to be canceled due to waterman=ge in status.                               Whit Almanzar RN

## 2025-03-17 NOTE — PAYOR COMM NOTE
"Carlito Sanchez (69 y.o. Male)     ATTN: NURSE REVIEWER  RE: INITIAL INPT AUTH CLINICALS   REF# [id number]  PLS REPLY TO MASON JOHNSON [phone] OR FAX# [phone]      Date of Birth   1955    Social Security Number       Address   [address] [place] [address]    Home Phone   [phone]    MRN   [id number]       Christianity   [employer]    Marital Status                               Admission Date   3/10/2025    Admission Type   Emergency    Admitting Provider   Amanda Martins MD    Attending Provider   Jacques Alonzo MD    Department, Room/Bed   [facility], N338/1       Discharge Date       Discharge Disposition       Discharge Destination                                 Attending Provider: Jacques Alonzo MD    Allergies: Prozac [Fluoxetine Hcl]    Isolation: None   Infection: None   Code Status: CPR    Ht: 182.9 cm (72\")   Wt: 63 kg (139 lb)    Admission Cmt: None   Principal Problem: Generalized weakness [R53.1]                   Active Insurance as of 3/10/2025       Primary Coverage       Payor Plan Insurance Group Employer/Plan Group    HUMANA MEDICARE REPLACEMENT HUMANA MEDICARE ADVANTAGE HMO [id number]       Payor Plan Address Payor Plan Phone Number Payor Plan Fax Number Effective Dates    [address] [address] [phone]  1/1/2025 - None Entered    [facility] [address]         Subscriber Name Subscriber Birth Date Member ID       CARLITO SANCHEZ 1955 [id number]                     Emergency Contacts        (Rel.) Home Phone Work Phone Mobile Phone    Lissette Sanchez (Spouse) [phone] -- [phone]    Mohamud Sanchez (Son) -- -- [phone]                 History & Physical        Amanda Martins MD at 03/10/25 2151              Patient Name:  Carlito Sanchez  YOB: 1955  MRN:  [id number]  Admit Date:  3/10/2025  Patient Care Team:  Belle Simons APRN as PCP - General (Family Medicine)  Amber Murguia MD as " Consulting Physician (Cardiology)  Sera [facility] as Pharmacist  Seth Ladd MD as Surgeon (General Surgery)  Kim Hoyos MD PhD as Consulting Physician (Hematology and Oncology)  Jerome Diallo MD as Referring Physician (Family Medicine)  Jose Enrique Louie MD as Consulting Physician (Gastroenterology)  Nima Huddleston MD as Consulting Physician (Nephrology)      Subjective  History Present Illness     Chief Complaint   Patient presents with    Weakness - Generalized       Mr. Mo is a 69 y.o. male with a history of ESRD on HD, CAD, type 2 diabetes mellitus, COPD, and history of stroke that presents to [facility] complaining of generalized weakness and a fall.  Patient had a mechanical fall today in which he tripped and landed on his bottom as far as his wife knows. He is not the best historian, so I called his wife to get some collateral information. There was no known loss of consciousness.  Did not hit his head.  His only complaint upon arrival was right hip pain.  Due to his weakness, he had difficulty getting up after the fall.  Patient was scheduled for dialysis today but was not able to make it, so his last dialysis was on Friday.  The patient has otherwise been in his usual state of health other than ongoing weakness.  No fevers or chills.  No night sweats.  No nausea, vomiting, or abdominal pain.  He has had some increased shortness of breath.  In the ER, workup was fairly unrevealing.  Labs consistent with ESRD.  XR of hip was negative.  Given ongoing weakness and potential need for rehab at discharge, the patient is being admitted to [facility] for further management.    Personal History     Past Medical History:   Diagnosis Date    Acquired hypothyroidism     Acute sinusitis     SYLVAIN (acute kidney injury)     on CKD    Anemia     Arthritis     Atrial fibrillation     CAD (coronary artery disease)     Chronic combined systolic and diastolic congestive heart failure      COPD (chronic obstructive pulmonary disease)     Depression     Diabetic neuropathy 04/11/2022    Diabetes mellitus due to underlying condition    Esophagitis 06/10/2020    Added automatically from request for surgery [id number]      ESRD (end stage renal disease) 12/01/2023    Frequent PVCs     Generalized weakness     GERD (gastroesophageal reflux disease)     Helicobacter pylori gastritis 06/04/2020    Hyperlipidemia     Hypertension     Hypertensive encephalopathy     Pleural effusion     Pneumonia     Poor historian     RBBB (right bundle branch block)     Stroke     POOR VISION    TIA (transient ischemic attack)     Type 2 diabetes mellitus     Urinary retention     Vitamin D deficiency      Past Surgical History:   Procedure Laterality Date    APPENDECTOMY N/A 02/14/2016    Dr. Alexey Dodson    ARTERIOVENOUS FISTULA/SHUNT SURGERY Right 06/03/2022    Procedure: RIGHT FOREARM ARTERIOVENOUS FISTULA PLACEMENT RADIOCEPHALIC WITH CEPHALIC VEIN TRANSPOSITION;  Surgeon: Eliseo Willis MD;  Location: [facility] MAIN OR;  Service: Vascular;  Laterality: Right;    COLONOSCOPY      over 20 years ago.  no polyps     COLONOSCOPY N/A 09/18/2018    Procedure: COLONOSCOPY;  Surgeon: Jose Enrique Louie MD;  Location: [facility] ENDOSCOPY;  Service: Gastroenterology    COLONOSCOPY N/A 09/19/2018    IH, EH, polyps (TAs w/low grade dysplasia)    COLONOSCOPY N/A 06/01/2020    Procedure: COLONOSCOPY;  Surgeon: Jose Enrique Louie MD;  Location: [facility] ENDOSCOPY;  Service: Gastroenterology;  Laterality: N/A;  Pre op-Anemia, Melena, History of Polyps  Post op-To Cecum/TI, Polyp, Poor Prep    CORONARY ARTERY BYPASS GRAFT  11/2001    ENDOSCOPY N/A 05/29/2020    Procedure: ESOPHAGOGASTRODUODENOSCOPY with biopsies;  Surgeon: Amanda Lovelace MD;  Location: [facility] ENDOSCOPY;  Service: Gastroenterology;  Laterality: N/A;  pre-anemia, dark stools  post-esophagitis, hiatal hernia    ENDOSCOPY N/A 09/15/2020    Procedure:  ESOPHAGOGASTRODUODENOSCOPY WITH BIOPSIES;  Surgeon: Jose Enrique Louie MD;  Location:  [facility] ENDOSCOPY;  Service: Gastroenterology;  Laterality: N/A;  pre: history of melena and esophagitis  post: mild esophagitis and gastritis, small hiatal hernia    ENDOSCOPY N/A 2023    Procedure: ESOPHAGOGASTRODUODENOSCOPY with bx;  Surgeon: Quoc Elmore MD;  Location:  [facility] ENDOSCOPY;  Service: Gastroenterology;  Laterality: N/A;  pre: Coffee-ground emesis  post: hiatal hernia, esophagitis    HERNIA REPAIR Left 2019    THORACENTESIS      TONSILLECTOMY      VASECTOMY       Family History   Problem Relation Age of Onset    Diabetes Mother     Hypertension Mother     Macular degeneration Mother     Alcohol abuse Father     Cancer Father         lung,  age 65    Heart disease Father     Alcohol abuse Brother     Cirrhosis Brother          age 50    Liver cancer Brother 45    Diabetes Son     Kidney failure Son     Autism Son     Malig Hyperthermia Neg Hx      Social History     Tobacco Use    Smoking status: Never    Smokeless tobacco: Never    Tobacco comments:     CAFFEINE - OCCAS. SODA   Vaping Use    Vaping status: Never Used   Substance Use Topics    Alcohol use: No     Comment: last drink 2 months ago    Drug use: No     No current facility-administered medications on file prior to encounter.     Current Outpatient Medications on File Prior to Encounter   Medication Sig Dispense Refill    Anti-Diarrheal 2 MG tablet TAKE ONE TABLET BY MOUTH TWICE DAILY AS NEEDED FOR DIARRHEA (Patient not taking: Reported on 10/25/2024)      aspirin 325 MG tablet Take 1 tablet by mouth Daily. (Patient not taking: Reported on 10/25/2024) 30 tablet 0    atorvastatin (LIPITOR) 80 MG tablet Take 1 tablet by mouth Every Night. (Patient not taking: Reported on 10/25/2024) 90 tablet 0    budesonide-formoterol (SYMBICORT) 160-4.5 MCG/ACT inhaler Inhale 2 puffs 2 (Two) Times a Day. (Patient not taking: Reported on  10/25/2024) 10.2 g 0    Eliquis 2.5 MG tablet tablet TAKE ONE TABLET BY MOUTH TWICE DAILY 60 tablet 11    famotidine (PEPCID) 20 MG tablet Take 1 tablet by mouth 2 (Two) Times a Day As Needed for Heartburn. (Patient not taking: Reported on 10/25/2024) 30 tablet 0    ipratropium-albuterol (COMBIVENT RESPIMAT)  MCG/ACT inhaler Inhale 1 puff 4 (Four) Times a Day As Needed for Wheezing. (Patient not taking: Reported on 10/25/2024) 4 g 0    levothyroxine (SYNTHROID, LEVOTHROID) 75 MCG tablet Take 1 tablet by mouth Every Morning. 30 tablet 0    pantoprazole (PROTONIX) 40 MG EC tablet Take 1 tablet by mouth 2 (Two) Times a Day Before Meals. 30 tablet 0    tamsulosin (FLOMAX) 0.4 MG capsule 24 hr capsule Take 1 capsule by mouth Daily. 30 capsule 0    vitamin D3 125 MCG (5000 UT) capsule capsule Take 1 capsule by mouth Daily. (Patient not taking: Reported on 10/25/2024) 30 capsule 0     Allergies   Allergen Reactions    Prozac [Fluoxetine Hcl] Other (See Comments)     shaking       Objective   Objective     Vital Signs  Temp:  [98 °F (36.7 °C)] 98 °F (36.7 °C)  Heart Rate:  [64-69] 69  Resp:  [18] 18  BP: (154-180)/(68-78) 180/78  SpO2:  [98 %-100 %] 100 %  on   ;   Device (Oxygen Therapy): room air  Body mass index is 19.8 kg/m².    Physical Exam  General: Alert, no acute distress. Chronically-ill appearing.   ENT: No conjunctival injection or scleral icterus. Moist mucous membranes.   Neuro: Eyes open and moving in all directions, strength normal and equal in all extremities, face symmetric, cranial nerves intact, no focal deficits.   Lungs: Clear to auscultation bilaterally. No wheeze or crackles. No distress.   Heart: RRR, no murmurs. No edema.  Abdomen: Soft, non-tender, non-distended. Normal bowel sounds.  Ext: Warm and well-perfused. No edema.   Skin: No rashes or lesions. IV site without swelling or erythema.     Lab Results (last 24 hours)       Procedure Component Value Units Date/Time    CBC & Differential  [[id number]]  (Abnormal) Collected: 03/10/25 1854    Specimen: Blood Updated: 03/10/25 1910    Narrative:      The following orders were created for panel order CBC & Differential.  Procedure                               Abnormality         Status                     ---------                               -----------         ------                     CBC Auto Differential[[id number]]        Abnormal            Final result                 Please view results for these tests on the individual orders.    Comprehensive Metabolic Panel [[id number]]  (Abnormal) Collected: 03/10/25 1854    Specimen: Blood Updated: 03/10/25 1931     Glucose 332 mg/dL      BUN 48 mg/dL      Creatinine 3.78 mg/dL      Sodium 133 mmol/L      Potassium 5.2 mmol/L      Chloride 96 mmol/L      CO2 29.3 mmol/L      Calcium 9.4 mg/dL      Total Protein 7.0 g/dL      Albumin 3.5 g/dL      ALT (SGPT) 16 U/L      AST (SGOT) 17 U/L      Alkaline Phosphatase 104 U/L      Total Bilirubin 0.4 mg/dL      Globulin 3.5 gm/dL      A/G Ratio 1.0 g/dL      BUN/Creatinine Ratio 12.7     Anion Gap 7.7 mmol/L      eGFR 16.5 mL/min/1.73     Narrative:      GFR Categories in Chronic Kidney Disease (CKD)      GFR Category          GFR (mL/min/1.73)    Interpretation  G1                     90 or greater         Normal or high (1)  G2                      60-89                Mild decrease (1)  G3a                   45-59                Mild to moderate decrease  G3b                   30-44                Moderate to severe decrease  G4                    15-29                Severe decrease  G5                    14 or less           Kidney failure          (1)In the absence of evidence of kidney disease, neither GFR category G1 or G2 fulfill the criteria for CKD.    eGFR calculation 2021 CKD-EPI creatinine equation, which does not include race as a factor    High Sensitivity Troponin T [[id number]]  (Abnormal) Collected: 03/10/25 1854    Specimen: Blood Updated:  03/10/25 1932     HS Troponin T 103 ng/L     Narrative:      High Sensitive Troponin T Reference Range:  <14.0 ng/L- Negative Female for AMI  <22.0 ng/L- Negative Male for AMI  >=14 - Abnormal Female indicating possible myocardial injury.  >=22 - Abnormal Male indicating possible myocardial injury.   Clinicians would have to utilize clinical acumen, EKG, Troponin, and serial changes to determine if it is an Acute Myocardial Infarction or myocardial injury due to an underlying chronic condition.         BNP [[id number]]  (Abnormal) Collected: 03/10/25 1854    Specimen: Blood Updated: 03/10/25 1931     proBNP 7,205.0 pg/mL     Narrative:      This assay is used as an aid in the diagnosis of individuals suspected of having heart failure. It can be used as an aid in the diagnosis of acute decompensated heart failure (ADHF) in patients presenting with signs and symptoms of ADHF to the emergency department (ED). In addition, NT-proBNP of <300 pg/mL indicates ADHF is not likely.    Age Range Result Interpretation  NT-proBNP Concentration (pg/mL:      <50             Positive            >450                   Gray                 300-450                    Negative             <300    50-75           Positive            >900                  Gray                300-900                  Negative            <300      >75             Positive            >1800                  Gray                300-1800                  Negative            <300    CBC Auto Differential [[id number]]  (Abnormal) Collected: 03/10/25 1854    Specimen: Blood Updated: 03/10/25 1910     WBC 5.80 10*3/mm3      RBC 4.05 10*6/mm3      Hemoglobin 10.9 g/dL      Hematocrit 34.9 %      MCV 86.2 fL      MCH 26.9 pg      MCHC 31.2 g/dL      RDW 14.4 %      RDW-SD 45.5 fl      MPV 10.6 fL      Platelets 246 10*3/mm3      Neutrophil % 78.8 %      Lymphocyte % 12.2 %      Monocyte % 6.0 %      Eosinophil % 2.4 %      Basophil % 0.3 %      Immature Grans % 0.3 %       Neutrophils, Absolute 4.56 10*3/mm3      Lymphocytes, Absolute 0.71 10*3/mm3      Monocytes, Absolute 0.35 10*3/mm3      Eosinophils, Absolute 0.14 10*3/mm3      Basophils, Absolute 0.02 10*3/mm3      Immature Grans, Absolute 0.02 10*3/mm3      nRBC 0.0 /100 WBC     High Sensitivity Troponin T 1Hr [[id number]]  (Abnormal) Collected: 03/10/25 2009    Specimen: Blood from Arm, Left Updated: 03/10/25 2102     HS Troponin T 103 ng/L      Troponin T Numeric Delta 0 ng/L      Troponin T % Delta 0    Narrative:      High Sensitive Troponin T Reference Range:  <14.0 ng/L- Negative Female for AMI  <22.0 ng/L- Negative Male for AMI  >=14 - Abnormal Female indicating possible myocardial injury.  >=22 - Abnormal Male indicating possible myocardial injury.   Clinicians would have to utilize clinical acumen, EKG, Troponin, and serial changes to determine if it is an Acute Myocardial Infarction or myocardial injury due to an underlying chronic condition.                 Imaging Results (Last 24 Hours)       Procedure Component Value Units Date/Time    XR Chest 1 View [[id number]] Collected: 03/10/25 2045     Updated: 03/10/25 2045    Narrative:      XR CHEST 1 VW-     CLINICAL HISTORY:  fatigue, SOA     FINDINGS:     The lungs are clear of acute infiltrates. The cardiomediastinal  silhouette is remarkable for atherosclerotic calcifications within the  aortic arch. Sternotomy wires are incidentally noted.       Impression:      No active disease in the chest.             XR Hip With or Without Pelvis 2 - 3 View Right [[id number]] Collected: 03/10/25 2031     Updated: 03/10/25 2039    Narrative:      XR HIP W OR WO PELVIS 2-3 VIEW RIGHT-     CLINICAL HISTORY:  Fall, right hip pain     FINDINGS:     Frontal projection of the pelvis and 2 radiographic views of the right  hip demonstrate no evidence for acute fracture or bony malalignment.           This report was finalized on 3/10/2025 8:36 PM by Dr. Matthieu Ortega M.D  on  Workstation: [id number]               Results for orders placed during the hospital encounter of 09/03/24    Adult transthoracic echo complete    Interpretation Summary    Left ventricular systolic function is mildly decreased. Calculated left ventricular EF = 44.7%. The following left ventricular wall segments are hypokinetic: apical inferior, mid inferior and apical septal.    Left ventricular diastolic function is consistent with (grade Ia w/high LAP) impaired relaxation.    There is a focal area of thickening and calcification on the anterior mitral valve without any significant stenosis or regurgitation.    Saline test results are negative for right to left atrial level shunt.    ECG 12 Lead Dyspnea   Preliminary Result   HEART RATE=69  bpm   RR Kscwrecn=951  ms   NV Duhmcegz=027  ms   P Horizontal Axis=23  deg   P Front Axis=0  deg   QRSD Csyacunu=770  ms   QT Ynuqvryf=377  ms   OPxR=941  ms   QRS Axis=-58  deg   T Wave Axis=47  deg   - ABNORMAL ECG -   Sinus rhythm   Atrial premature complexes   Short NV interval   Right bundle branch block   Date and Time of Study:2025-03-10 18:53:37        Assessment/Plan   Assessment & Plan  Active Hospital Problems    Diagnosis  POA    **Generalized weakness [R53.1]  Yes    Chronic HFrEF (heart failure with reduced ejection fraction) [I50.22]  Yes    ESRD (end stage renal disease) [N18.6]  Yes    History of stroke [Z86.73]  Not Applicable    Paroxysmal atrial fibrillation [I48.0]  Yes    Embolic stroke [I63.9]  Yes    Essential hypertension [I10]  Yes    Coronary artery disease involving native coronary artery of native heart without angina pectoris [I25.10]  Yes    Acquired hypothyroidism [E03.9]  Yes    Type 2 diabetes mellitus, with long-term current use of insulin [E11.9, Z79.4]  Not Applicable      Resolved Hospital Problems   No resolved problems to display.       69 y.o. male admitted with Generalized weakness.    Generalized weakness  Fall with right hip  pain  -XR right hip negative for fracture  -If pain persists when working with PT, can consider CT scan. At this point, pain seems improved.   -No focal neurologic findings on exam.  Cranial nerves are intact and strength is equal in all extremities.  -Check orthostatics, seems falls may be related to positional changes   -Check CK and TSH  -PT/OT consults    ESRD  Hyponatremia  -Last hemodialysis was Friday 3/7, typically gets dialysis MWF  -Consult nephrology, plan for dialysis tomorrow  -Repeat BMP with morning labs    Hypertension  Coronary artery disease  Elevated troponin  Paroxysmal atrial fibrillation  Chronic diastolic and systolic heart failure  -proBNP elevated but consistent with priors, also in setting of ESRD  -Troponin elevated at 103 but flat, consistent with previous checks and in setting of ESRD  -AC: Eliquis 2.5 mg twice daily  -RC: None    Anemia of ESRD  -Hgb fairly consistent with baseline  -No signs of active bleeding  -Repeat CBC with morning labs, transfuse for Hgb less than 7    History of CVA  -Resume home ASA and statin  -Eliquis as above, stroke embolic in nature    Hypothyroidism  -Check TSH  -Resume home Synthroid    Type 2 diabetes mellitus  -Blood glucose elevated on admission  -Not on long-acting insulin at home  -Start SSI, titrate insulin as needed based on requirements    SCDs for DVT prophylaxis.  Full code.  Discussed with patient and ED provider.      Amanda Martins MD  [facility]  03/10/25  22:20 EDT        Electronically signed by Amanda Martins MD at 03/10/25 7423       Facility-Administered Medications as of 3/17/2025   Medication Dose Route Frequency Provider Last Rate Last Admin    [] albumin human 25 % IV SOLN 12.5 g  12.5 g Intravenous PRN Jose Enrique Montelongo MD        [] albumin human 25 % IV SOLN 12.5 g  12.5 g Intravenous PRN Jose Enrique Montelongo MD        amiodarone (PACERONE) tablet 200 mg  200 mg Oral Q24H  Mauricio Corona MD   200 mg at 03/17/25 1208    [START ON 3/18/2025] amoxicillin (AMOXIL) capsule 500 mg  500 mg Oral Q8H Jhonatan Carolina MD        ampicillin-sulbactam (UNASYN) 3 g in sodium chloride 0.9 % 100 mL MBP  3 g Intravenous Q12H Jhonatan Carolina MD   3 g at 03/17/25 0535    apixaban (ELIQUIS) tablet 2.5 mg  2.5 mg Oral BID Amanda Martins MD   2.5 mg at 03/17/25 0845    aspirin EC tablet 325 mg  325 mg Oral Daily Tiera Cunha APRN   325 mg at 03/16/25 0828    atorvastatin (LIPITOR) tablet 80 mg  80 mg Oral Nightly Tiera Cunha APRN   80 mg at 03/16/25 2030    dextrose (D50W) (25 g/50 mL) IV injection 25 g  25 g Intravenous Q15 Min PRN Tiera Cunha APRN   25 g at 03/14/25 2107    dextrose (GLUTOSE) oral gel 15 g  15 g Oral Q15 Min PRN Tiera Cunha APRN        [COMPLETED] digoxin (LANOXIN) injection 125 mcg  125 mcg Intravenous Once Asim Hogue MD   125 mcg at 03/16/25 1130    glucagon (GLUCAGEN) injection 1 mg  1 mg Intramuscular Q15 Min PRN Tiera Cunha APRN        insulin lispro (HUMALOG/ADMELOG) injection 2-7 Units  2-7 Units Subcutaneous 4x Daily AC & at Bedtime Tiera Cunha APRN   2 Units at 03/17/25 1314    [COMPLETED] iopamidol (ISOVUE-300) 61 % injection 100 mL  100 mL Intravenous Once in imaging Asim Hogue MD   85 mL at 03/16/25 1507    levothyroxine (SYNTHROID, LEVOTHROID) tablet 75 mcg  75 mcg Oral Q AM Amanda Martins MD   75 mcg at 03/17/25 0535    midodrine (PROAMATINE) tablet 10 mg  10 mg Oral TID AC Jose Enrique Montelongo MD   10 mg at 03/17/25 1208    [COMPLETED] midodrine (PROAMATINE) tablet 5 mg  5 mg Oral Once Asim Hogue MD   5 mg at 03/16/25 1050    [COMPLETED] midodrine (PROAMATINE) tablet 5 mg  5 mg Oral Once Jose Enrique Montelongo MD   5 mg at 03/16/25 1256    mupirocin (BACTROBAN) 2 % nasal ointment 1 Application  1 Application Each Nare BID Jhonatan Carolina MD   1 Application at 03/16/25  1825    ondansetron (ZOFRAN) injection 4 mg  4 mg Intravenous Q6H PRN Tiera Cunha APRN   4 mg at 03/16/25 0255    pantoprazole (PROTONIX) EC tablet 40 mg  40 mg Oral BID AC Amanda Martins MD   40 mg at 03/17/25 0845    [COMPLETED] perflutren (DEFINITY) 8.476 mg in Sodium Chloride (PF) 0.9 % 10 mL injection  3 mL Intravenous Once in imaging David Munroe MD   3 mL at 03/16/25 1249    phenylephrine (SAURAV-SYNEPHRINE) 50 mg in 250 mL NS infusion  0.5-3 mcg/kg/min Intravenous Titrated Jhonatan Carolina MD   Stopped at 03/17/25 0545    [COMPLETED] sodium chloride 0.9 % bolus 500 mL  500 mL Intravenous Once Jose Enrique Montelongo MD   Stopped at 03/16/25 1215    sodium chloride 0.9 % flush 10 mL  10 mL Intravenous Q12H Tiera Cunha APRN   10 mL at 03/17/25 0846    sodium chloride 0.9 % flush 10 mL  10 mL Intravenous PRN Tiera Cunha, JI        sodium chloride 0.9 % infusion 40 mL  40 mL Intravenous PRN Tiera Cunha APRN        sodium chloride nasal spray 2 spray  2 spray Each Nare PRN Indira Andrea APRN        tamsulosin (FLOMAX) 24 hr capsule 0.4 mg  0.4 mg Oral Daily Amanda Martins MD   0.4 mg at 03/17/25 0845     Lab Results (last 24 hours)       Procedure Component Value Units Date/Time    POC Glucose Once [[id number]]  (Abnormal) Collected: 03/17/25 1118    Specimen: Blood Updated: 03/17/25 1120     Glucose 188 mg/dL     POC Glucose Once [[id number]]  (Abnormal) Collected: 03/17/25 0659    Specimen: Blood Updated: 03/17/25 0700     Glucose 140 mg/dL     Basic Metabolic Panel [[id number]]  (Abnormal) Collected: 03/17/25 0427    Specimen: Blood from Arm, Left Updated: 03/17/25 0515     Glucose 141 mg/dL      BUN 58 mg/dL      Creatinine 4.34 mg/dL      Sodium 136 mmol/L      Potassium 4.0 mmol/L      Chloride 100 mmol/L      CO2 20.0 mmol/L      Calcium 8.2 mg/dL      BUN/Creatinine Ratio 13.4     Anion Gap 16.0 mmol/L      eGFR 14.0 mL/min/1.73     Narrative:      GFR  Categories in Chronic Kidney Disease (CKD)      GFR Category          GFR (mL/min/1.73)    Interpretation  G1                     90 or greater         Normal or high (1)  G2                      60-89                Mild decrease (1)  G3a                   45-59                Mild to moderate decrease  G3b                   30-44                Moderate to severe decrease  G4                    15-29                Severe decrease  G5                    14 or less           Kidney failure          (1)In the absence of evidence of kidney disease, neither GFR category G1 or G2 fulfill the criteria for CKD.    eGFR calculation 2021 CKD-EPI creatinine equation, which does not include race as a factor    CBC & Differential [[id number]]  (Abnormal) Collected: 03/17/25 0427    Specimen: Blood from Arm, Left Updated: 03/17/25 0457    Narrative:      The following orders were created for panel order CBC & Differential.  Procedure                               Abnormality         Status                     ---------                               -----------         ------                     CBC Auto Differential[[id number]]        Abnormal            Final result                 Please view results for these tests on the individual orders.    CBC Auto Differential [[id number]]  (Abnormal) Collected: 03/17/25 0427    Specimen: Blood from Arm, Left Updated: 03/17/25 0457     WBC 4.65 10*3/mm3      RBC 3.19 10*6/mm3      Hemoglobin 8.7 g/dL      Hematocrit 27.0 %      MCV 84.6 fL      MCH 27.3 pg      MCHC 32.2 g/dL      RDW 14.5 %      RDW-SD 44.9 fl      MPV 10.1 fL      Platelets 216 10*3/mm3      Neutrophil % 77.0 %      Lymphocyte % 8.8 %      Monocyte % 12.3 %      Eosinophil % 1.3 %      Basophil % 0.0 %      Immature Grans % 0.6 %      Neutrophils, Absolute 3.58 10*3/mm3      Lymphocytes, Absolute 0.41 10*3/mm3      Monocytes, Absolute 0.57 10*3/mm3      Eosinophils, Absolute 0.06 10*3/mm3      Basophils, Absolute 0.00  10*3/mm3      Immature Grans, Absolute 0.03 10*3/mm3      nRBC 0.0 /100 WBC     Blood Culture - Blood, Arm, Right [[id number]]  (Normal) Collected: 03/16/25 0420    Specimen: Blood from Arm, Right Updated: 03/17/25 0430     Blood Culture No growth at 24 hours    Blood Culture - Blood, Arm, Left [[id number]]  (Normal) Collected: 03/16/25 0420    Specimen: Blood from Arm, Left Updated: 03/17/25 0430     Blood Culture No growth at 24 hours    POC Glucose Once [[id number]]  (Abnormal) Collected: 03/16/25 2011    Specimen: Blood Updated: 03/16/25 2012     Glucose 188 mg/dL     POC Glucose Once [[id number]]  (Abnormal) Collected: 03/16/25 1634    Specimen: Blood Updated: 03/16/25 1635     Glucose 131 mg/dL           Imaging Results (Last 24 Hours)       Procedure Component Value Units Date/Time    CT Head Without Contrast [[id number]] Collected: 03/16/25 1554     Updated: 03/16/25 1602    Narrative:      CT HEAD WO CONTRAST-     INDICATION: Altered mental status     COMPARISON: CT head September 4, 2024     TECHNIQUE:  Routine CT head without IV contrast. Coronal and sagittal reformats.  Radiation dose reduction techniques were utilized, including automated  exposure control and exposure modulation based on body size.     FINDINGS:      Brain: Small area of hypoattenuation and volume loss seen within the  right superior cerebellum, consistent with encephalomalacia from prior  infarct. Chronic small vessel ischemic changes. No mass effect or  midline shift. No intraparenchymal hemorrhage.     Ventricles: Ventriculomegaly, suspect ex vacuo dilatation from central  volume loss. No intraventricular hemorrhage.     Extra-axial spaces: No extra-axial hemorrhage or fluid collection seen.     Scalp: Subcutaneous lesion seen in the midline upper neck, with  calcification, series 2, axial mage 6, possible sebaceous cyst or  epidermoid cyst. Additional cystic scalp lesions seen in the left  posterior scalp, series 2, axial mage 42,  possible sebaceous cyst or  epidermoid cyst.     Osseous structures: No fracture or bone lesion.     Sinuses: Suspect cerumen in the right external auditory canal. Patent  mastoid air cells and middle ears. Suspect large mucous retention cyst  in the right maxillary sinus.       Impression:         1. No acute intracranial process.  2. Area of encephalomalacia in the right superior cerebellum, consistent  with an old infarct, unchanged.     This report was finalized on 3/16/2025 3:59 PM by Dr. Jose Enrique Rojas M.D on Workstation: [id number]       CT Abdomen Pelvis With Contrast [[id number]] Collected: 03/16/25 1551     Updated: 03/16/25 1602    Narrative:      CT ABDOMEN PELVIS W CONTRAST-     Radiation dose reduction techniques were utilized, including automated  exposure control and exposure modulation based on body size.     Clinical: Abdominal pain, dialysis patient     COMPARISON examination dated 11/24/2023     FINDINGS: The liver, gallbladder, pancreas, spleen and adrenal glands  are satisfactory in appearance. Renal cortical pattern of enhancement is  symmetric and satisfactory, no calculus or obstructive uropathy. There  is atherosclerotic calcification of a normal diameter aorta. Mild  bladder wall thickening/trabeculation as before. Prostate and seminal  vesicles have a stable satisfactory appearance.     No free intraperitoneal gas nor fluid. Caliber of the large and small  bowel is within normal limits. There appears to be some wall thickening  involving the low sigmoid and rectum as well as a 4 cm long area of wall  thickening involving the mid sigmoid colon. No diverticula seen,  possible colitis. Small bowel is satisfactory in appearance. Stomach and  duodenum are unremarkable.     Trace right sided pleural effusion. There is nominal  atelectasis/infiltrate along the pleural surface of the right  costophrenic sulcus, in addition there is groundglass infiltrate  demonstrated at the base of the right  middle lobe. Possible pneumonia.     Incidentally noted L5 spondylolysis and mild spondylolisthesis.     CONCLUSION: Airspace disease at the base of the right lower lobe and  right middle lobe, potential pneumonia. Trace right pleural effusion.  There appears to be some low sigmoid and rectal wall thickening as well  as a potential second focus of wall thickening in the mid sigmoid colon,  question colitis.              This report was finalized on 3/16/2025 3:59 PM by Dr. Bryan Burnham M.D  on Workstation: [id number]             ECG/EMG Results (last 24 hours)       Procedure Component Value Units Date/Time    Telemetry Scan [[id number]] Resulted: 03/10/25     Updated: 03/16/25 1638    ECG 12 Lead Rhythm Change [[id number]] Collected: 03/16/25 1539     Updated: 03/16/25 2053     QT Interval 370 ms      QTC Interval 532 ms     Narrative:      HEART DKHH=226  bpm  RR Fkvrzmkf=021  ms  NJ Interval=  ms  P Horizontal Axis=  deg  P Front Axis=  deg  QRSD Ojbvsrrs=440  ms  QT Qyhzddne=701  ms  HAyO=793  ms  QRS Axis=-81  deg  T Wave Axis=31  deg  - ABNORMAL ECG -  Atrial flutter  Right bundle branch block  Anterior  infarct, age indeterminate  Prolonged QT interval  No change from prior tracing  Electronically Signed By: Romeo Pham ([facility]) 2025-03-16 20:52:57  Date and Time of Study:2025-03-16 15:39:51    ECG 12 Lead Rhythm Change [[id number]] Collected: 03/16/25 1048     Updated: 03/16/25 2053     QT Interval 370 ms      QTC Interval 531 ms     Narrative:      HEART ACHH=987  bpm  RR Uubehcbd=748  ms  NJ Interval=  ms  P Horizontal Axis=  deg  P Front Axis=  deg  QRSD Uiloslie=706  ms  QT Ezezwcbw=624  ms  OSxE=200  ms  QRS Axis=-79  deg  T Wave Axis=61  deg  - ABNORMAL ECG -  AFIB vs flutter  RBBB and LAFB  Prolonged QT interval  When compared with ECG of 16-Mar-2025 10:20:19,  No significant change  Significant repolarization change  Electronically Signed By: Romeo Pham ([facility]) 2025-03-16 20:53:37  Date and Time of  Study:2025-03-16 10:53:44    ECG 12 Lead Rhythm Change [[id number]] Collected: 03/16/25 1020     Updated: 03/16/25 2054     QT Interval 357 ms      QTC Interval 488 ms     Narrative:      HEART KTVF=148  bpm  RR Wrvycosz=373  ms  ME Xjljnljz=454  ms  P Horizontal Axis=  deg  P Front Axis=88  deg  QRSD Ygchrymh=591  ms  QT Rskuvdsp=380  ms  SFzI=978  ms  QRS Axis=-82  deg  T Wave Axis=76  deg  - ABNORMAL ECG -  afib vs flutter  Right bundle branch block  Left anterior fascicular block  When compared with ECG of 14-Mar-2025 16:03:58,  Significant change in rhythm: previously sinus  Electronically Signed By: Romeo Pham ([facility]) 2025-03-16 20:54:13  Date and Time of Study:2025-03-16 10:20:19    ECG 12 Lead Bradycardia [[id number]] Collected: 03/14/25 1603     Updated: 03/16/25 2100     QT Interval 448 ms      QTC Interval 452 ms     Narrative:      HEART RATE=67  bpm  RR Yccsknds=889  ms  ME Xbkhgerc=409  ms  P Horizontal Axis=-53  deg  P Front Axis=-59  deg  QRSD Vnczuozh=645  ms  QT Lcngymme=439  ms  KZoE=199  ms  QRS Axis=-61  deg  T Wave Axis=56  deg  - ABNORMAL ECG -  Sinus or ectopic atrial rhythm  Multiple premature complexes, vent & supraven  Prolonged ME interval  Right bundle branch block  Inferior  infarct, old  Probable  anteroseptal infarct, old  No change from prior tracing  Electronically Signed By: [facility] ([facility]) 2025-03-16 20:59:57  Date and Time of Study:2025-03-14 16:03:58    Telemetry Scan [[id number]] Resulted: 03/10/25     Updated: 03/17/25 0936    Telemetry Scan [[id number]] Resulted: 03/10/25     Updated: 03/17/25 1339          Orders (last 24 hrs)        Start     Ordered    03/18/25 0600  amoxicillin (AMOXIL) capsule 500 mg  Every 8 Hours Scheduled         03/17/25 1144    03/18/25 0430  Renal Function Panel  Morning Draw         03/17/25 0901    03/18/25 0430  CBC & Differential  Morning Draw         03/17/25 0901    03/18/25 0240  Auto Discontinue in 48 Hours if not Collected  ONCE  CDIFF         25 0239    25 0000  Hemodialysis Inpatient  Once        Comments: Midodrine 10 mg 1 hour before dialysis and 2 hours after starting the  Heparin 2000 units IV with the    25 0904    25 1502  Inpatient Admission  Once         25 1501    25 1437  OT Plan of Care Cert / Re-Cert  Once        Comments: Occupational Therapy Plan of Care  Initial Certification  Certification Period: 3/17/2025 - 6/15/2025    Patient Name: Carlito Mo  : 1955    (R53.1) Generalized weakness  (primary encounter diagnosis)  (N18.6,  Z99.2) ESRD on dialysis  (W19.XXXA) Fall, initial encounter                Assessment  OT Assessment  Rehab Potential (OT): good  Criteria for Skilled Therapeutic Interventions Met (OT): yes, meets criteria         OT Rehab Goals     Row Name 25 1433             Bed Mobility Goal 1 (OT)    Activity/Assistive Device (Bed Mobility Goal 1, OT) supine to sit;sit to   supine  -LE      [place] Level/Cues Needed (Bed Mobility Goal 1, OT) modified   independence  -LE      Time Frame (Bed Mobility Goal 1, OT) 2 weeks  -LE      Progress/Outcomes (Bed Mobility Goal 1, OT) goal revised this date  -LE           Transfer Goal 1 (OT)    Activity/Assistive Device (Transfer Goal 1, OT)   sit-to-stand/stand-to-sit;bed-to-chair/chair-to-bed;toilet;walker,   rolling;commode, 3-in-1  -LE      [place] Level/Cues Needed (Transfer Goal 1, OT) verbal cues   required;standby assist  -LE      Time Frame (Transfer Goal 1, OT) 2 weeks  -LE      Progress/Outcome (Transfer Goal 1, OT) goal revised this date  -LE         Bathing Goal 1 (OT)    Activity/Device (Bathing Goal 1, OT) bathing skills, all  -LE      [place] Level/Cues Needed (Bathing Goal 1, OT) standby assist;set-up   required;nonverbal cues (demo/gesture) required  -LE      Time Frame (Bathing Goal 1, OT) 2 weeks  -LE      Progress/Outcomes (Bathing Goal 1, OT) goal revised this date  -LE         Dressing  Goal 1 (OT)    Activity/Device (Dressing Goal 1, OT) dressing skills, all  -LE      [place]/Cues Needed (Dressing Goal 1, OT) standby assist;set-up   required;contact guard required  including item retrieval.  -LE      Time Frame (Dressing Goal 1, OT) 2 weeks  -LE      Progress/Outcome (Dressing Goal 1, OT) goal ongoing  -LE         Toileting Goal 1 (OT)    Activity/Device (Toileting Goal 1, OT) toileting skills, all  -LE      [place] Level/Cues Needed (Toileting Goal 1, OT) standby   assist;tactile cues required;contact guard required  -LE      Time Frame (Toileting Goal 1, OT) 2 weeks  -LE      Progress/Outcome (Toileting Goal 1, OT) goal revised this date  -LE         Grooming Goal 1 (OT)    Progress/Outcome (Grooming Goal 1, OT) goal ongoing  -LE            User Key  (r) = Recorded By, (t) = Taken By, (c) = Cosigned By    Initials Name Provider Type Discipline    Alisia Nowak, OTR Occupational Therapist OT                    Plan    OT Plan  Therapy Frequency (OT): 5 times/wk  Outcome Evaluation: Re-eval due to 3/16 xfer to CCU with Afib and hypotension.   Chest CT shows L LL infiltrate. Surgery following, ? Ileus. Pt agreeable to working with OT and his response to most questions is reporting he has a stuffy nose.  Pt is up to min A to move to EOB, work on sit to stand a few times with cueing each time for hand placement.  Cueing  throughout session to keep eyes open and head lifted.  RN states OK for activity and discuss BP parameters (goal is MAP >65).   Pt demo flexed posture with sitting and standing and xfer to recliner.  Once sitting in chair pt able to elise socks.  When discussing toileting pt reports not always aware of need to void and using brief and urinal. Will cont to follow for skilled OT and agree with plan for SNU due to decrease insight, cueing with tasks needed and pt a fall risk. Goals revised as needed.      FADI Pepe  3/17/2025        By cosigning this order, either  electronically or physically, I certify that the therapy services are furnished while this patient is under my care, the services outline above are required by this patient, and will be reviewed every 90 days.        M.D.:__________________________________________ Date: ______________    03/17/25 1437    03/17/25 1200  Dietary Nutrition Supplements Boost Glucose Control (Glucerna Shake); chocolate  Daily With Breakfast, Lunch & Dinner      Comments: Each shake contains 170 mg potassium    03/17/25 1141    03/17/25 1145  Transfer Patient  Once         03/17/25 1144    03/17/25 1145  Cardiac Monitoring  Continuous        Comments: Follow Standing Orders As Outlined in Process Instructions (Open Order Report to View Full Instructions)    03/17/25 1144    03/17/25 1121  POC Glucose Once  PROCEDURE ONCE        Comments: Complete no more than 45 minutes prior to patient eating      03/17/25 1118    03/17/25 1015  amiodarone (PACERONE) tablet 200 mg  Every 24 Hours Scheduled         03/17/25 0925    03/17/25 0905  PT Consult: Eval & Treat Functional Mobility Below Baseline  Once         03/17/25 0904    03/17/25 0905  OT Consult: Eval & Treat Early Mobility Assessment  Once         03/17/25 0904    03/17/25 0701  POC Glucose Once  PROCEDURE ONCE        Comments: Complete no more than 45 minutes prior to patient eating      03/17/25 0659    03/17/25 0600  CBC Auto Differential  PROCEDURE ONCE         03/16/25 2201 03/16/25 2013  POC Glucose Once  PROCEDURE ONCE        Comments: Complete no more than 45 minutes prior to patient eating      03/16/25 2011 03/16/25 1900  mupirocin (BACTROBAN) 2 % nasal ointment 1 Application  2 Times Daily         03/16/25 1800    03/16/25 1803  Diet: Cardiac, Diabetic, Renal; Low Sodium (2g); Consistent Carbohydrate; Low Sodium (2-3g), Low Potassium; Fluid Consistency: Thin (IDDSI 0)  Diet Effective Now         03/16/25 1803    03/16/25 1801  Daily CHG Bath While in ICU  Daily       Comments: Use for admission bath & daily bath for duration of critical care stay    03/16/25 1800    03/16/25 1730  midodrine (PROAMATINE) tablet 10 mg  3 Times Daily Before Meals         03/16/25 1212    03/16/25 1700  ampicillin-sulbactam (UNASYN) 3 g in sodium chloride 0.9 % 100 mL MBP  Every 12 Hours         03/16/25 1614    03/16/25 1636  POC Glucose Once  PROCEDURE ONCE        Comments: Complete no more than 45 minutes prior to patient eating      03/16/25 1634    03/16/25 1600  iopamidol (ISOVUE-300) 61 % injection 100 mL  Once in Imaging         03/16/25 1507    03/16/25 1527  ECG 12 Lead Rhythm Change  Once         03/16/25 1527    03/16/25 1245  phenylephrine (SAURAV-SYNEPHRINE) 50 mg in 250 mL NS infusion  Titrated         03/16/25 1150    03/16/25 0337  sodium chloride nasal spray 2 spray  As Needed         03/16/25 0339    03/15/25 0000  aspirin 81 MG EC tablet  Daily         03/15/25 1134    03/15/25 0000  insulin lispro (HUMALOG/ADMELOG) 100 UNIT/ML injection  4 Times Daily Before Meals & Nightly         03/15/25 1134    03/11/25 0900  apixaban (ELIQUIS) tablet 2.5 mg  2 Times Daily         03/11/25 0126    03/11/25 0900  tamsulosin (FLOMAX) 24 hr capsule 0.4 mg  Daily         03/11/25 0126    03/11/25 0900  aspirin EC tablet 325 mg  Daily         03/10/25 2223    03/11/25 0800  Oral Care  2 Times Daily       03/10/25 2151 03/11/25 0730  pantoprazole (PROTONIX) EC tablet 40 mg  2 Times Daily Before Meals         03/11/25 0126    03/11/25 0700  POC Glucose 4x Daily Before Meals & at Bedtime  4 Times Daily Before Meals & at Bedtime       03/10/25 2223    03/11/25 0600  Basic Metabolic Panel  Daily       03/10/25 2151 03/11/25 0600  CBC & Differential  Daily       03/10/25 2151 03/11/25 0600  levothyroxine (SYNTHROID, LEVOTHROID) tablet 75 mcg  Every Early Morning         03/11/25 0126    03/11/25 0000  Vital Signs  Every 4 Hours       03/10/25 2151    03/10/25 2239  atorvastatin (LIPITOR) tablet  80 mg  Nightly         03/10/25 2223    03/10/25 2239  insulin lispro (HUMALOG/ADMELOG) injection 2-7 Units  4 Times Daily Before Meals & Nightly         03/10/25 2223    03/10/25 2223  dextrose (GLUTOSE) oral gel 15 g  Every 15 Minutes PRN         03/10/25 2223    03/10/25 2223  dextrose (D50W) (25 g/50 mL) IV injection 25 g  Every 15 Minutes PRN         03/10/25 2223    03/10/25 2223  glucagon (GLUCAGEN) injection 1 mg  Every 15 Minutes PRN         03/10/25 2223    03/10/25 2207  sodium chloride 0.9 % flush 10 mL  Every 12 Hours Scheduled         03/10/25 2151    03/10/25 2151  Intake & Output  Every Shift       03/10/25 2151    03/10/25 2150  sodium chloride 0.9 % flush 10 mL  As Needed         03/10/25 2151    03/10/25 2150  sodium chloride 0.9 % infusion 40 mL  As Needed         03/10/25 2151    03/10/25 2150  ondansetron (ZOFRAN) injection 4 mg  Every 6 Hours PRN         03/10/25 2151    Unscheduled  Up With Assistance  As Needed       03/10/25 2151    Unscheduled  Follow Hypoglycemia Standing Orders For Blood Glucose <70 & Notify Provider of Treatment  As Needed      Comments: Follow Hypoglycemia Orders As Outlined in Process Instructions (Open Order Report to View Full Instructions)  Notify Provider Any Time Hypoglycemia Treatment is Administered    03/10/25 2223    Signed and Held  albumin human 25 % IV SOLN 12.5 g  As Needed         Signed and Held    Signed and Held  albumin human 25 % IV SOLN 12.5 g  As Needed         Signed and Held    Signed and Held  sodium chloride 0.9 % bolus 1,000 mL  As Needed         Signed and Held                  Operative/Procedure Notes (last 24 hours)  Notes from 03/16/25 1523 through 03/17/25 1523   No notes of this type exist for this encounter.          Physician Progress Notes (last 24 hours)        Julio C Juan Jr., MD at 03/17/25 1145          Chief Complaint:    Ileus    Subjective:    The patient is feeling well with no further nausea or vomiting.  He is  tolerating a diet.  He had a CT scan of the abdomen and pelvis yesterday that was normal except for mild thickening in the sigmoid colon and rectum.    Objective:    Temp:  [97.5 °F (36.4 °C)-98.8 °F (37.1 °C)] 97.5 °F (36.4 °C)  Heart Rate:  [] 68  Resp:  [19-20] 19  BP: ()/(35-79) 127/59    Physical Exam  Constitutional:       General: He is awake.      Appearance: He is not ill-appearing or toxic-appearing.   Abdominal:      Palpations: Abdomen is soft.      Tenderness: There is no abdominal tenderness.   Psychiatric:         Behavior: Behavior is cooperative.       BMI is within normal parameters. No other follow-up for BMI required.        Results:    WBC is 4.65.  H/H is 8.7/27.0.  BUN is 58 creatinine is 4.34.    Assessment/Plan:    The patient had an ileus that caused some nausea and vomiting that has resolved.  There is no acute surgical issue at this time.  He is ready for discharge at any time.  I will follow him as an outpatient.    Julio C Juan Jr., M.D.     Electronically signed by Julio C Juan Jr., MD at 03/17/25 1148       Mauricio Corona MD at 03/17/25 0911           LOS: 0 days   Patient Care Team:  B[place] APRN as PCP - General (Family Medicine)  Amber Murguia MD as Consulting Physician (Cardiology)  [facility] as Pharmacist  Seth Ladd MD as Surgeon (General Surgery)  Kim Hoyos MD PhD as Consulting Physician (Hematology and Oncology)  Jerome Diallo MD as Referring Physician (Family Medicine)  Jose Enrique Louie MD as Consulting Physician (Gastroenterology)  Nima Huddleston MD as Consulting Physician (Nephrology)    Chief Complaint: Follow-up paroxysmal typical atrial fibrillation and paroxysmal typical atrial flutter, hypotension.    Interval History: Doing better this morning.  He is back in sinus rhythm.  His blood pressure is stable.  He feels better in general.  No lightheadedness or chest pain.    Vital Signs:  Temp:   [97.7 °F (36.5 °C)-98.8 °F (37.1 °C)] 97.7 °F (36.5 °C)  Heart Rate:  [] 71  Resp:  [19-20] 19  BP: ()/(35-79) 129/70    Intake/Output Summary (Last 24 hours) at 3/17/2025 0911  Last data filed at 3/17/2025 0841  Gross per 24 hour   Intake 991.95 ml   Output 300 ml   Net 691.95 ml       Physical Exam:   General Appearance:    No acute distress, alert and oriented x4, chronically ill-appearing.   Lungs:     Clear to auscultation bilaterally     Heart:    Regular rhythm and normal rate.  No murmurs, gallops, or    rubs.   Abdomen:     Soft, nontender, nondistended.    Extremities:   No clubbing, cyanosis, or edema.     Results Review:    Results from last 7 days   Lab Units 03/17/25  0427   SODIUM mmol/L 136   POTASSIUM mmol/L 4.0   CHLORIDE mmol/L 100   CO2 mmol/L 20.0*   BUN mg/dL 58*   CREATININE mg/dL 4.34*   GLUCOSE mg/dL 141*   CALCIUM mg/dL 8.2*     Results from last 7 days   Lab Units 03/11/25  0500 03/10/25  2009 03/10/25  1854   CK TOTAL U/L 77  --   --    HSTROP T ng/L  --  103* 103*     Results from last 7 days   Lab Units 03/17/25  0427   WBC 10*3/mm3 4.65   HEMOGLOBIN g/dL 8.7*   HEMATOCRIT % 27.0*   PLATELETS 10*3/mm3 216         Results from last 7 days   Lab Units 03/16/25  1138   CHOLESTEROL mg/dL 114     Results from last 7 days   Lab Units 03/16/25  1138   MAGNESIUM mg/dL 1.8     Results from last 7 days   Lab Units 03/16/25  1138   CHOLESTEROL mg/dL 114   TRIGLYCERIDES mg/dL 55   HDL CHOL mg/dL 48   LDL CHOL mg/dL 53       I reviewed the patient's new clinical results.        Assessment:  1.  End-stage renal disease, on hemodialysis  2.  Mechanical fall onto buttocks prior to admission  3.  Paroxysmal typical atrial flutter with RVR with associated hypotension  4.  Paroxysmal atrial fibrillation  5.  History of orthostatic hypotension  6.  Chronic combined CHF  7.  Cardiomyopathy with varying ejection fractions in the past (35 to 40% by echo on 3/16/2025)  8.  Coronary artery disease,  status post CABG in   9.  History of embolic CVA, on chronic anticoagulation  10.  COPD without acute exacerbation (on home oxygen)  11.  Baseline right bundle branch block  12.  History of coffee-ground emesis in 2023 without bleeding source by EGD on 2023  13.  Anemia of chronic disease  14.  Longstanding diabetes  15.  Chronically elevated troponin secondary to ESRD  16.  Deconditioning    Plan:  -He does have a history of hypotension and orthostatic hypotension in the past with rapid atrial fibrillation, and especially after dialysis.  I suspect that the rapid atrial flutter yesterday triggered the hypotension.  He is off of pressors.    -He is on midodrine 10 mg 3 times daily.    -He does not tolerate rapid atrial arrhythmias well as noted above.  I am going to put him on 200 mg of oral amiodarone at least temporarily.    -Continue Eliquis 2.5 mg twice daily.    -Ejection fraction 35 to 40% in the setting of tachycardia.  He has had varying ejection fraction in the past, mainly in the 30s to 40s.  Volume status will be through hemodialysis.    -No objection to transfer to rehab today per cardiology.    Mauricio Corona MD  25  09:11 EDT         Electronically signed by Mauricio Corona MD at 25 0922       Cameron Olguin MD at 25 0856              Nephrology [facility] Progress Note      Patient Name: Carlito Mo  : 1955  MRN: [id number]  Primary Care Physician:  Belle Simons, JI  Date of admission: 3/10/2025    Subjective     Interval History:   F/U ESRD  The patient had dialysis on Saturday with 2 L of fluid removed, his blood pressure improved, denies any chest pain or shortness of air, no nausea or vomiting.  Review of Systems:   As noted above    Objective     Vitals:   Temp:  [97.7 °F (36.5 °C)-98.8 °F (37.1 °C)] 97.7 °F (36.5 °C)  Heart Rate:  [] 71  Resp:  [19-20] 19  BP: ()/(35-79) 129/70    Intake/Output Summary  (Last 24 hours) at 3/17/2025 0856  Last data filed at 3/17/2025 0841  Gross per 24 hour   Intake 991.95 ml   Output 300 ml   Net 691.95 ml       Physical Exam:    General Appearance: Awake, alert, chronically ill and slow answering questions   Neck: No JVD  Lungs: Bilateral rhonchi, breathing effort not labored  Heart: Irregularly irregular, no  Abdomen: soft, nontender, nondistended  Extremities: no edema, cyanosis or clubbing ,functional AV fistula in the right forearm with good thrill and bruit      Scheduled Meds:     ampicillin-sulbactam, 3 g, Intravenous, Q12H  apixaban, 2.5 mg, Oral, BID  aspirin, 325 mg, Oral, Daily  atorvastatin, 80 mg, Oral, Nightly  insulin lispro, 2-7 Units, Subcutaneous, 4x Daily AC & at Bedtime  levothyroxine, 75 mcg, Oral, Q AM  midodrine, 10 mg, Oral, TID AC  mupirocin, 1 Application, Each Nare, BID  pantoprazole, 40 mg, Oral, BID AC  sodium chloride, 10 mL, Intravenous, Q12H  tamsulosin, 0.4 mg, Oral, Daily      IV Meds:   phenylephrine, 0.5-3 mcg/kg/min, Last Rate: Stopped (03/17/25 0545)        Results Reviewed:   I have personally reviewed the results from the time of this admission to 3/17/2025 08:56 EDT     Results from last 7 days   Lab Units 03/17/25  0427 03/16/25  1138 03/16/25  0301 03/11/25  0500 03/10/25  1854   SODIUM mmol/L 136 138 139   < > 133*   POTASSIUM mmol/L 4.0 4.1  4.1 4.1   < > 5.2   CHLORIDE mmol/L 100 101 99   < > 96*   CO2 mmol/L 20.0* 22.6 22.0   < > 29.3*   BUN mg/dL 58* 51* 43*   < > 48*   CREATININE mg/dL 4.34* 3.51* 2.96*   < > 3.78*   CALCIUM mg/dL 8.2* 8.4* 9.0   < > 9.4   BILIRUBIN mg/dL  --  0.3  --   --  0.4   ALK PHOS U/L  --  104  --   --  104   ALT (SGPT) U/L  --  17  --   --  16   AST (SGOT) U/L  --  18  --   --  17   GLUCOSE mg/dL 141* 140* 219*   < > 332*    < > = values in this interval not displayed.     Estimated Creatinine Clearance: 14.3 mL/min (A) (by C-G formula based on SCr of 4.34 mg/dL (H)).  Results from last 7 days   Lab Units  25  1138 25  0414 25  0501 25  0518   MAGNESIUM mg/dL 1.8 1.9  --   --    PHOSPHORUS mg/dL  --  3.2 3.6 3.2         Results from last 7 days   Lab Units 25  0427 25  1151 25  0301 03/15/25  0409 25  0414   WBC 10*3/mm3 4.65 10.70 11.94* 4.76 4.91   HEMOGLOBIN g/dL 8.7* 9.4* 11.0* 8.5* 8.7*   PLATELETS 10*3/mm3 216 233 260 213 205           Assessment / Plan     ASSESSMENT:  ESRD - HD MWF via RUE AVF - TTS this week (dialyzed Saturday) as will be his schedule at rehab ([facility]).  While we did remove 2L of fluid during dialysis yesterday, his BP was stable coming off treatment and normotensive rest of day til this AM.    Hypotension -related to A-fib with RVR, improved significant  S/P fall with generalized weakness - plan is rehab   DM2, mgmt per primary team  AF/RVR -followed by cardio  BPH, on flomax   Anemia of CKD, lab error re: initial hgb (8.5 -> 11); hemoglobin 8.7       PLAN:  Continue the same treat  Continue midodrine  Hemodialysis tomorrow  Probable transfer to rehab  Surveillance labs    I reviewed the chart and other providers notes, reviewed labs.  I discussed the case with Dr. Carolina  Copied text in this note has been reviewed and is accurate as of 25.         Cameron Olguin MD  25  08:56 EDT    [facility]  [phone]    Electronically signed by Cameron Olguin MD at 25 0904       Jhonatan Carolina MD at 25 2124              [facility]  [phone]  Dr. Jhonatan Carolina    Subjective:  LOS: 0    Chief Complaint: Hypotension and A-fib    Now he is normotensive and heart rate controlled with sinus rhythm.  Denies any complaints.  No cough phlegm fever chills rigors.    Objective   Vital Signs past 24hrs  Temp range: Temp (24hrs), Av.4 °F (36.9 °C), Min:97.7 °F (36.5 °C), Max:99.1 °F (37.3 °C)    BP range: BP: ()/(35-79) 127/54  Pulse range: Heart Rate:  [] 67  Resp rate  range: Resp:  [18-20] 19  Device (Oxygen Therapy): room air   Oxygen range:SpO2:  [95 %-100 %] 99 %   Mechanical Ventilator:     Physical Exam  Eyes:      Pupils: Pupils are equal, round, and reactive to light.   Cardiovascular:      Rate and Rhythm: Normal rate and regular rhythm.      Heart sounds: No murmur heard.  Pulmonary:      Effort: Pulmonary effort is normal.      Breath sounds: Normal breath sounds.   Abdominal:      General: Bowel sounds are normal.      Palpations: Abdomen is soft. There is no mass.      Tenderness: There is no abdominal tenderness.   Musculoskeletal:         General: No swelling.   Neurological:      Mental Status: He is alert.       Results Review:    I have reviewed the laboratory and imaging data since the last note by [facility] physician.  My annotations are noted in assessment and plan.      Result Review:  I have personally reviewed the results from last note by [facility] physician to 3/17/2025 07:14 EDT and agree with these findings:  [x]  Laboratory list / accordion  [x]  Microbiology  [x]  Radiology  []  EKG/Telemetry   []  Cardiology/Vascular   []  Pathology  []  Old records  []  Other:      Medication Review:  I have reviewed the current MAR.  My annotations are noted in assessment and plan.    ampicillin-sulbactam, 3 g, Intravenous, Q12H  apixaban, 2.5 mg, Oral, BID  aspirin, 325 mg, Oral, Daily  atorvastatin, 80 mg, Oral, Nightly  insulin lispro, 2-7 Units, Subcutaneous, 4x Daily AC & at Bedtime  levothyroxine, 75 mcg, Oral, Q AM  midodrine, 10 mg, Oral, TID AC  mupirocin, 1 Application, Each Nare, BID  pantoprazole, 40 mg, Oral, BID AC  sodium chloride, 10 mL, Intravenous, Q12H  tamsulosin, 0.4 mg, Oral, Daily        phenylephrine, 0.5-3 mcg/kg/min, Last Rate: Stopped (03/17/25 0545)      Lines, Drains & Airways       Active LDAs       Name Placement date Placement time Site Days    Peripheral IV 03/10/25 Anterior;Left;Proximal Forearm 03/10/25  --  Forearm  7    Peripheral IV  03/16/25 1106 Left Wrist 03/16/25  1106  Wrist  less than 1                    PCCM Problems  Hypotension presumably cardiogenic  Chronic hypotension on midodrine  A-fib with RVR  ESRD on HD  Type 2 diabetes mellitus  History CVA  Chronic anticoagulation for A-fib  Cardiomyopathy with EF 45%  CAD  Nausea and vomiting  Abdominal pain  RML and RLL infiltrate  Thickening of rectum and sigmoid walls      Plan of Treatment    Hypotension likely related to A-fib and now resolved.  Remains on midodrine.  Note input by cardiology and nephrology.    A-fib with RVR.  Now sinus.  Will be on amiodarone.    Next dialysis is tomorrow morning and anticipate patient will receive at this facility and then go to rehab.    Right middle lobe and right lower lobe infiltrate seen on the CT chest.  Unclear if new infection or simply sterile aspiration.  Will treat with antibiotics for 5 days.  Can switch to oral antibiotics tomorrow.    Sliding scale for diabetes.    No acute issues in the abdomen and appreciate general surgery input.  Will see if they have an opinion on the CT abdomen.    Patient can be downgraded to telemetry floor.  [facility] to resume care.    Jhonatan Carolina MD  03/17/25  07:14 EDT    Isolation status: Contact Spore    Dietary Orders (From admission, onward)       Start     Ordered    03/16/25 1803  Diet: Cardiac, Diabetic, Renal; Low Sodium (2g); Consistent Carbohydrate; Low Sodium (2-3g), Low Potassium; Fluid Consistency: Thin (IDDSI 0)  Diet Effective Now        References:    Diet Order Definitions   Question Answer Comment   Diets: Cardiac    Diets: Diabetic    Diets: Renal    Cardiac Diet: Low Sodium (2g)    Diabetic Diet: Consistent Carbohydrate    Renal Diet: Low Sodium (2-3g)    Renal Diet: Low Potassium    Fluid Consistency: Thin (IDDSI 0)        03/16/25 1803                    Part of this note may be an electronic transcription/translation of spoken language to printed text using the Dragon Dictation  System.      Electronically signed by Jhonatan Carolina MD at 03/17/25 1143       Jhonatan Caroilna MD at 03/16/25 1612          CT abdomen shows mild infiltrate in RLL. Given events and hx of n/v; will rx with abx.    Electronically signed by Jhonatan Carolina MD, 03/16/25, 4:13 PM EDT.     Electronically signed by Jhonatan Carolina MD at 03/16/25 1613       Consult Notes (last 24 hours)  Notes from 03/16/25 1523 through 03/17/25 1523   No notes of this type exist for this encounter.

## 2025-03-17 NOTE — PROGRESS NOTES
LOS: 0 days   Patient Care Team:  Belle Simons APRN as PCP - General (Family Medicine)  Amber Murguia MD as Consulting Physician (Cardiology)  Sera La RPH as Pharmacist  Seth Ladd MD as Surgeon (General Surgery)  Kim Hoyos MD PhD as Consulting Physician (Hematology and Oncology)  Jeroem Diallo MD as Referring Physician (Family Medicine)  Jose Enrique Louie MD as Consulting Physician (Gastroenterology)  Nima Huddleston MD as Consulting Physician (Nephrology)    Chief Complaint: Follow-up paroxysmal typical atrial fibrillation and paroxysmal typical atrial flutter, hypotension.    Interval History: Doing better this morning.  He is back in sinus rhythm.  His blood pressure is stable.  He feels better in general.  No lightheadedness or chest pain.    Vital Signs:  Temp:  [97.7 °F (36.5 °C)-98.8 °F (37.1 °C)] 97.7 °F (36.5 °C)  Heart Rate:  [] 71  Resp:  [19-20] 19  BP: ()/(35-79) 129/70    Intake/Output Summary (Last 24 hours) at 3/17/2025 0911  Last data filed at 3/17/2025 0841  Gross per 24 hour   Intake 991.95 ml   Output 300 ml   Net 691.95 ml       Physical Exam:   General Appearance:    No acute distress, alert and oriented x4, chronically ill-appearing.   Lungs:     Clear to auscultation bilaterally     Heart:    Regular rhythm and normal rate.  No murmurs, gallops, or    rubs.   Abdomen:     Soft, nontender, nondistended.    Extremities:   No clubbing, cyanosis, or edema.     Results Review:    Results from last 7 days   Lab Units 03/17/25  0427   SODIUM mmol/L 136   POTASSIUM mmol/L 4.0   CHLORIDE mmol/L 100   CO2 mmol/L 20.0*   BUN mg/dL 58*   CREATININE mg/dL 4.34*   GLUCOSE mg/dL 141*   CALCIUM mg/dL 8.2*     Results from last 7 days   Lab Units 03/11/25  0500 03/10/25  2009 03/10/25  1854   CK TOTAL U/L 77  --   --    HSTROP T ng/L  --  103* 103*     Results from last 7 days   Lab Units 03/17/25  0427   WBC 10*3/mm3 4.65   HEMOGLOBIN g/dL 8.7*    HEMATOCRIT % 27.0*   PLATELETS 10*3/mm3 216         Results from last 7 days   Lab Units 03/16/25  1138   CHOLESTEROL mg/dL 114     Results from last 7 days   Lab Units 03/16/25  1138   MAGNESIUM mg/dL 1.8     Results from last 7 days   Lab Units 03/16/25  1138   CHOLESTEROL mg/dL 114   TRIGLYCERIDES mg/dL 55   HDL CHOL mg/dL 48   LDL CHOL mg/dL 53       I reviewed the patient's new clinical results.        Assessment:  1.  End-stage renal disease, on hemodialysis  2.  Mechanical fall onto buttocks prior to admission  3.  Paroxysmal typical atrial flutter with RVR with associated hypotension  4.  Paroxysmal atrial fibrillation  5.  History of orthostatic hypotension  6.  Chronic combined CHF  7.  Cardiomyopathy with varying ejection fractions in the past (35 to 40% by echo on 3/16/2025)  8.  Coronary artery disease, status post CABG in 2001  9.  History of embolic CVA, on chronic anticoagulation  10.  COPD without acute exacerbation (on home oxygen)  11.  Baseline right bundle branch block  12.  History of coffee-ground emesis in December 2023 without bleeding source by EGD on 12/4/2023  13.  Anemia of chronic disease  14.  Longstanding diabetes  15.  Chronically elevated troponin secondary to ESRD  16.  Deconditioning    Plan:  -He does have a history of hypotension and orthostatic hypotension in the past with rapid atrial fibrillation, and especially after dialysis.  I suspect that the rapid atrial flutter yesterday triggered the hypotension.  He is off of pressors.    -He is on midodrine 10 mg 3 times daily.    -He does not tolerate rapid atrial arrhythmias well as noted above.  I am going to put him on 200 mg of oral amiodarone at least temporarily.    -Continue Eliquis 2.5 mg twice daily.    -Ejection fraction 35 to 40% in the setting of tachycardia.  He has had varying ejection fraction in the past, mainly in the 30s to 40s.  Volume status will be through hemodialysis.    -No objection to transfer to rehab  today per cardiology.    Mauricio Corona MD  03/17/25  09:11 EDT

## 2025-03-17 NOTE — PROGRESS NOTES
[facility] Progress Note      Patient Name: Carlito Mo  : 1955  MRN: [id number]  Primary Care Physician:  Belle Simons APRN  Date of admission: 3/10/2025    Subjective     Interval History:   F/U ESRD  The patient had dialysis on Saturday with 2 L of fluid removed, his blood pressure improved, denies any chest pain or shortness of air, no nausea or vomiting.  Review of Systems:   As noted above    Objective     Vitals:   Temp:  [97.7 °F (36.5 °C)-98.8 °F (37.1 °C)] 97.7 °F (36.5 °C)  Heart Rate:  [] 71  Resp:  [19-20] 19  BP: ()/(35-79) 129/70    Intake/Output Summary (Last 24 hours) at 3/17/2025 0856  Last data filed at 3/17/2025 0841  Gross per 24 hour   Intake 991.95 ml   Output 300 ml   Net 691.95 ml       Physical Exam:    General Appearance: Awake, alert, chronically ill and slow answering questions   Neck: No JVD  Lungs: Bilateral rhonchi, breathing effort not labored  Heart: Irregularly irregular, no  Abdomen: soft, nontender, nondistended  Extremities: no edema, cyanosis or clubbing ,functional AV fistula in the right forearm with good thrill and bruit      Scheduled Meds:     ampicillin-sulbactam, 3 g, Intravenous, Q12H  apixaban, 2.5 mg, Oral, BID  aspirin, 325 mg, Oral, Daily  atorvastatin, 80 mg, Oral, Nightly  insulin lispro, 2-7 Units, Subcutaneous, 4x Daily AC & at Bedtime  levothyroxine, 75 mcg, Oral, Q AM  midodrine, 10 mg, Oral, TID AC  mupirocin, 1 Application, Each Nare, BID  pantoprazole, 40 mg, Oral, BID AC  sodium chloride, 10 mL, Intravenous, Q12H  tamsulosin, 0.4 mg, Oral, Daily      IV Meds:   phenylephrine, 0.5-3 mcg/kg/min, Last Rate: Stopped (25 0545)        Results Reviewed:   I have personally reviewed the results from the time of this admission to 3/17/2025 08:56 EDT     Results from last 7 days   Lab Units 03/17/25  0427 25  1138 25  0301 25  0500 03/10/25  1854   SODIUM mmol/L 136 138 139   < > 133*    POTASSIUM mmol/L 4.0 4.1  4.1 4.1   < > 5.2   CHLORIDE mmol/L 100 101 99   < > 96*   CO2 mmol/L 20.0* 22.6 22.0   < > 29.3*   BUN mg/dL 58* 51* 43*   < > 48*   CREATININE mg/dL 4.34* 3.51* 2.96*   < > 3.78*   CALCIUM mg/dL 8.2* 8.4* 9.0   < > 9.4   BILIRUBIN mg/dL  --  0.3  --   --  0.4   ALK PHOS U/L  --  104  --   --  104   ALT (SGPT) U/L  --  17  --   --  16   AST (SGOT) U/L  --  18  --   --  17   GLUCOSE mg/dL 141* 140* 219*   < > 332*    < > = values in this interval not displayed.     Estimated Creatinine Clearance: 14.3 mL/min (A) (by C-G formula based on SCr of 4.34 mg/dL (H)).  Results from last 7 days   Lab Units 03/16/25  1138 03/14/25  0414 03/13/25  0501 03/12/25  0518   MAGNESIUM mg/dL 1.8 1.9  --   --    PHOSPHORUS mg/dL  --  3.2 3.6 3.2         Results from last 7 days   Lab Units 03/17/25  0427 03/16/25  1151 03/16/25  0301 03/15/25  0409 03/14/25  0414   WBC 10*3/mm3 4.65 10.70 11.94* 4.76 4.91   HEMOGLOBIN g/dL 8.7* 9.4* 11.0* 8.5* 8.7*   PLATELETS 10*3/mm3 216 233 260 213 205           Assessment / Plan     ASSESSMENT:  ESRD - HD MWF via RUE AVF - TTS this week (dialyzed Saturday) as will be his schedule at rehab ([facility]).  While we did remove 2L of fluid during dialysis yesterday, his BP was stable coming off treatment and normotensive rest of day til this AM.    Hypotension -related to A-fib with RVR, improved significant  S/P fall with generalized weakness - plan is rehab   DM2, mgmt per primary team  AF/RVR -followed by cardio  BPH, on flomax   Anemia of CKD, lab error re: initial hgb (8.5 -> 11); hemoglobin 8.7       PLAN:  Continue the same treat  Continue midodrine  Hemodialysis tomorrow  Probable transfer to rehab  Surveillance labs    I reviewed the chart and other providers notes, reviewed labs.  I discussed the case with Dr. Carolina  Copied text in this note has been reviewed and is accurate as of 03/17/25.         Cameron Olguin MD  03/17/25  08:56 EDT    [facility]  [facility]  [phone]

## 2025-03-17 NOTE — PROGRESS NOTES
Name: Carlito Mo ADMIT: 3/10/2025   : 1955  PCP: Belle Simons APRN    MRN: [id number] LOS: 0 days   AGE/SEX: 69 y.o. male  ROOM: [facility]     Subjective   Subjective   Patient is seen at bedside, continues to feel weak, lying in bed.       Objective   Objective   Vital Signs  Temp:  [97.5 °F (36.4 °C)-98.8 °F (37.1 °C)] 97.5 °F (36.4 °C)  Heart Rate:  [] 66  Resp:  [19-20] 19  BP: ()/(35-91) 134/62  SpO2:  [95 %-100 %] 100 %  on   ;   Device (Oxygen Therapy): room air  Body mass index is 18.85 kg/m².  Physical Exam  General, awake and alert.  Appears sick on chronic basis  Head and ENT, normocephalic and atraumatic.  Lungs, symmetric expansion, equal air entry bilaterally.  Heart, regular rate and rhythm.  Abdomen, soft and nontender.  Extremities, no clubbing or cyanosis.  Neuro, no focal deficits.  Skin: Warm and no rash.  Psych, normal mood and affect.  Musculoskeletal, joint examination is grossly normal.      Results Review     I reviewed the patient's new clinical results.  Results from last 7 days   Lab Units 25  0427 25  1151 25  0301 03/15/25  0409   WBC 10*3/mm3 4.65 10.70 11.94* 4.76   HEMOGLOBIN g/dL 8.7* 9.4* 11.0* 8.5*   PLATELETS 10*3/mm3 216 233 260 213     Results from last 7 days   Lab Units 25  0427 25  1138 25  0301 03/15/25  0409   SODIUM mmol/L 136 138 139 134*   POTASSIUM mmol/L 4.0 4.1  4.1 4.1 4.7   CHLORIDE mmol/L 100 101 99 99   CO2 mmol/L 20.0* 22.6 22.0 25.0   BUN mg/dL 58* 51* 43* 50*   CREATININE mg/dL 4.34* 3.51* 2.96* 3.56*   GLUCOSE mg/dL 141* 140* 219* 115*   EGFR mL/min/1.73 14.0* 18.1* 22.2* 17.8*     Results from last 7 days   Lab Units 25  1138 25  0518 03/10/25  1854   ALBUMIN g/dL 3.3* 3.3* 3.5   BILIRUBIN mg/dL 0.3  --  0.4   ALK PHOS U/L 104  --  104   AST (SGOT) U/L 18  --  17   ALT (SGPT) U/L 17  --  16     Results from last 7 days   Lab Units 25  0427 25  1138 25  0301  03/15/25  0409 03/14/25  0414 03/13/25  0501 03/12/25  0518 03/11/25  0500 03/10/25  1854   CALCIUM mg/dL 8.2* 8.4* 9.0 8.2* 8.2* 8.5* 8.5*   < > 9.4   ALBUMIN g/dL  --  3.3*  --   --   --   --  3.3*  --  3.5   MAGNESIUM mg/dL  --  1.8  --   --  1.9  --   --   --   --    PHOSPHORUS mg/dL  --   --   --   --  3.2 3.6 3.2  --   --     < > = values in this interval not displayed.     Results from last 7 days   Lab Units 03/16/25  1151 03/16/25  1138 03/16/25  0420 03/16/25  0301   PROCALCITONIN ng/mL  --  0.81*  --  0.29*   LACTATE mmol/L 1.5  --  1.9  --      Glucose   Date/Time Value Ref Range Status   03/17/2025 1118 188 (H) 70 - 130 mg/dL Final   03/17/2025 0659 140 (H) 70 - 130 mg/dL Final   03/16/2025 2011 188 (H) 70 - 130 mg/dL Final   03/16/2025 1634 131 (H) 70 - 130 mg/dL Final   03/16/2025 1117 143 (H) 70 - 130 mg/dL Final   03/16/2025 1032 140 (H) 70 - 130 mg/dL Final   03/16/2025 0613 225 (H) 70 - 130 mg/dL Final       CT Head Without Contrast  Result Date: 3/16/2025   1. No acute intracranial process. 2. Area of encephalomalacia in the right superior cerebellum, consistent with an old infarct, unchanged.  This report was finalized on 3/16/2025 3:59 PM by Dr. Jose Enrique Rojas M.D on Workstation: [id number]      XR Abdomen KUB  Result Date: 3/16/2025  Mildly prominent loop of small bowel within the left upper quadrant could reflect some localized ileus. However, there is no clear evidence of small bowel obstruction.  This report was finalized on 3/16/2025 5:08 AM by Dr. Alice Allen M.D on Workstation: [id number]      XR Chest 1 View  Result Date: 3/16/2025  No acute findings.  This report was finalized on 3/16/2025 5:05 AM by Dr. Alice Allen M.D on Workstation: [id number]!        I have personally reviewed all medications:  Scheduled Medications  amiodarone, 200 mg, Oral, Q24H  [START ON 3/18/2025] amoxicillin, 500 mg, Oral, Q8H  ampicillin-sulbactam, 3 g, Intravenous, Q12H  apixaban, 2.5 mg,  Oral, BID  aspirin, 325 mg, Oral, Daily  atorvastatin, 80 mg, Oral, Nightly  insulin lispro, 2-7 Units, Subcutaneous, 4x Daily AC & at Bedtime  levothyroxine, 75 mcg, Oral, Q AM  midodrine, 10 mg, Oral, TID AC  mupirocin, 1 Application, Each Nare, BID  pantoprazole, 40 mg, Oral, BID AC  sodium chloride, 10 mL, Intravenous, Q12H  tamsulosin, 0.4 mg, Oral, Daily    Infusions  phenylephrine, 0.5-3 mcg/kg/min, Last Rate: Stopped (03/17/25 0545)    Diet  Diet: Cardiac, Diabetic, Renal; Low Sodium (2g); Consistent Carbohydrate; Low Sodium (2-3g), Low Potassium; Fluid Consistency: Thin (IDDSI 0)    I have personally reviewed:  [x]  Laboratory   [x]  Microbiology   [x]  Radiology   [x]  EKG/Telemetry  [x]  Cardiology/Vascular   []  Pathology    []  Records       Assessment/Plan     Active Hospital Problems    Diagnosis  POA   • **Generalized weakness [R53.1]  Yes   • Ileus [K56.7]  Unknown   • Chronic HFrEF (heart failure with reduced ejection fraction) [I50.22]  Yes   • ESRD (end stage renal disease) [N18.6]  Yes   • History of stroke [Z86.73]  Not Applicable   • Paroxysmal atrial fibrillation [I48.0]  Yes   • Embolic stroke [I63.9]  Yes   • Essential hypertension [I10]  Yes   • Coronary artery disease involving native coronary artery of native heart without angina pectoris [I25.10]  Yes   • Acquired hypothyroidism [E03.9]  Yes   • Type 2 diabetes mellitus, with long-term current use of insulin [E11.9, Z79.4]  Not Applicable      Resolved Hospital Problems   No resolved problems to display.       69 y.o. male admitted with Generalized weakness.    Assessment and plan  1.  Hypotension, related to A-fib, now resolved.  Patient remains on midodrine.  Patient followed by cardiology and nephrology service, follow management recommendations.  Continue amiodarone.    2.  End-stage renal disease, dependent on hemodialysis, continue dialysis based on nephrology recommendations.    3.  Pneumonia, seen on CT scan, continue  antibiotics.  Can transition to oral antibiotics soon.    4.  Diabetes mellitus, continue Accu-Cheks and sliding scale insulin coverage.    5.  CODE STATUS is full code.  Further plans based on hospital course.          Expected Discharge Date: 3/19/2025; Expected Discharge Time:       Jacques Alonzo MD  [facility]  03/17/25  15:47 EDT

## 2025-03-17 NOTE — PROGRESS NOTES
Chief Complaint:    Ileus    Subjective:    The patient is feeling well with no further nausea or vomiting.  He is tolerating a diet.  He had a CT scan of the abdomen and pelvis yesterday that was normal except for mild thickening in the sigmoid colon and rectum.    Objective:    Temp:  [97.5 °F (36.4 °C)-98.8 °F (37.1 °C)] 97.5 °F (36.4 °C)  Heart Rate:  [] 68  Resp:  [19-20] 19  BP: ()/(35-79) 127/59    Physical Exam  Constitutional:       General: He is awake.      Appearance: He is not ill-appearing or toxic-appearing.   Abdominal:      Palpations: Abdomen is soft.      Tenderness: There is no abdominal tenderness.   Psychiatric:         Behavior: Behavior is cooperative.       BMI is within normal parameters. No other follow-up for BMI required.        Results:    WBC is 4.65.  H/H is 8.7/27.0.  BUN is 58 creatinine is 4.34.    Assessment/Plan:    The patient had an ileus that caused some nausea and vomiting that has resolved.  There is no acute surgical issue at this time.  He is ready for discharge at any time.  I will follow him as an outpatient.    Julio C Juan Jr., M.D.

## 2025-03-17 NOTE — THERAPY TREATMENT NOTE
Patient Name: Carlito Mo  : 1955    MRN: [id number]                              Today's Date: 3/17/2025       Admit Date: 3/10/2025    Visit Dx:     ICD-10-CM ICD-9-CM   1. Generalized weakness  R53.1 780.79   2. ESRD on dialysis  N18.6 585.6    Z99.2 V45.11   3. Fall, initial encounter  W19.XXXA E888.9     Patient Active Problem List   Diagnosis    Major depressive disorder with single episode, in partial remission    Other hyperlipidemia    Type 2 diabetes mellitus, with long-term current use of insulin    Vitamin D deficiency    Erectile dysfunction    Chronic fatigue    Type 2 diabetes mellitus with ophthalmic complication    Benign prostatic hyperplasia with nocturia    History of basal cell carcinoma    Acquired hypothyroidism    Recurrent strokes    Suspected sleep apnea    YAW (obstructive sleep apnea)    Coronary artery disease involving native coronary artery of native heart without angina pectoris    Chronically elevated troponin    Colon cancer screening    Enlarged lymph nodes    Functional gait abnormality    History of myocardial infarction    Insomnia    Iron deficiency anemia    Major depression, recurrent    Essential hypertension    Acute on chronic renal insufficiency    Falls frequently    Acute on chronic anemia    Neurogenic orthostatic hypotension    Anemia, chronic disease    History of colon polyps    Helicobacter pylori gastritis    Esophagitis    Embolic stroke    Patent foramen ovale    Cardiomyopathy    Diarrhea    Generalized weakness    Paroxysmal atrial fibrillation    Chronic anticoagulation    Acute ischemic stroke    History of stroke    Iron deficiency anemia    Acute HFrEF (heart failure with reduced ejection fraction)    ESRD (end stage renal disease)    Hemoptysis    Chronic HFrEF (heart failure with reduced ejection fraction)    Hemodialysis patient    Exertional dyspnea    Severe malnutrition    Ataxia    Cerebellar stroke, acute    Medically noncompliant     Vertebral artery stenosis, bilateral    Carotid stenosis, right    Ileus     Past Medical History:   Diagnosis Date    Acquired hypothyroidism     Acute sinusitis     SYLVAIN (acute kidney injury)     on CKD    Anemia     Arthritis     Atrial fibrillation     CAD (coronary artery disease)     Chronic combined systolic and diastolic congestive heart failure     COPD (chronic obstructive pulmonary disease)     Depression     Diabetic neuropathy 04/11/2022    Diabetes mellitus due to underlying condition    Esophagitis 06/10/2020    Added automatically from request for surgery [id number]      ESRD (end stage renal disease) 12/01/2023    Frequent PVCs     Generalized weakness     GERD (gastroesophageal reflux disease)     Helicobacter pylori gastritis 06/04/2020    Hyperlipidemia     Hypertension     Hypertensive encephalopathy     Pleural effusion     Pneumonia     Poor historian     RBBB (right bundle branch block)     Stroke     POOR VISION    TIA (transient ischemic attack)     Type 2 diabetes mellitus     Urinary retention     Vitamin D deficiency      Past Surgical History:   Procedure Laterality Date    APPENDECTOMY N/A 02/14/2016    Dr. Alexey Dodson    ARTERIOVENOUS FISTULA/SHUNT SURGERY Right 06/03/2022    Procedure: RIGHT FOREARM ARTERIOVENOUS FISTULA PLACEMENT RADIOCEPHALIC WITH CEPHALIC VEIN TRANSPOSITION;  Surgeon: Eliseo Willis MD;  Location: [facility] MAIN OR;  Service: Vascular;  Laterality: Right;    COLONOSCOPY      over 20 years ago.  no polyps     COLONOSCOPY N/A 09/18/2018    Procedure: COLONOSCOPY;  Surgeon: Jose Enrique Louie MD;  Location: [facility] ENDOSCOPY;  Service: Gastroenterology    COLONOSCOPY N/A 09/19/2018    IH, EH, polyps (TAs w/low grade dysplasia)    COLONOSCOPY N/A 06/01/2020    Procedure: COLONOSCOPY;  Surgeon: Jose Enrique Louie MD;  Location: [facility] ENDOSCOPY;  Service: Gastroenterology;  Laterality: N/A;  Pre op-Anemia, Melena, History of Polyps  Post op-To Cecum/TI, Polyp,  Poor Prep    CORONARY ARTERY BYPASS GRAFT  11/2001    ENDOSCOPY N/A 05/29/2020    Procedure: ESOPHAGOGASTRODUODENOSCOPY with biopsies;  Surgeon: Amanda Lovelace MD;  Location: [facility] ENDOSCOPY;  Service: Gastroenterology;  Laterality: N/A;  pre-anemia, dark stools  post-esophagitis, hiatal hernia    ENDOSCOPY N/A 09/15/2020    Procedure: ESOPHAGOGASTRODUODENOSCOPY WITH BIOPSIES;  Surgeon: Jose Enrique Louie MD;  Location: [facility] ENDOSCOPY;  Service: Gastroenterology;  Laterality: N/A;  pre: history of melena and esophagitis  post: mild esophagitis and gastritis, small hiatal hernia    ENDOSCOPY N/A 12/4/2023    Procedure: ESOPHAGOGASTRODUODENOSCOPY with bx;  Surgeon: Quoc Elmore MD;  Location: [facility] ENDOSCOPY;  Service: Gastroenterology;  Laterality: N/A;  pre: Coffee-ground emesis  post: hiatal hernia, esophagitis    HERNIA REPAIR Left 12/05/2019    THORACENTESIS      TONSILLECTOMY      VASECTOMY        General Information       Row Name 03/17/25 1443          Physical Therapy Time and Intention    Document Type therapy note (daily note)  -PC (r) CB (t) PC (c)     Mode of Treatment physical therapy  -PC (r) CB (t) PC (c)       Row Name 03/17/25 1443          General Information    Existing Precautions/Restrictions fall  -PC (r) CB (t) PC (c)       Row Name 03/17/25 1443          Cognition    Orientation Status (Cognition) oriented x 3  -PC (r) CB (t) PC (c)       Row Name 03/17/25 1443          Safety Issues/Impairments Affecting Functional Mobility    Impairments Affecting Function (Mobility) balance;strength;endurance/activity tolerance;postural/trunk control  -PC (r) CB (t) PC (c)               User Key  (r) = Recorded By, (t) = Taken By, (c) = Cosigned By      Initials Name Provider Type    PC Denise Espinal, PT Physical Therapist    CB Charlene Staley, DENISSE Student PT Student                   Mobility       Row Name 03/17/25 1444          Bed Mobility    Comment, (Bed Mobility) In chair upon  arrival.  -PC (r) CB (t) PC (c)       Row Name 03/17/25 1444          Sit-Stand Transfer    Sit-Stand [place] (Transfers) minimum assist (75% patient effort)  -PC (r) CB (t) PC (c)     Assistive Device (Sit-Stand Transfers) walker, front-wheeled  -PC (r) CB (t) PC (c)       Row Name 03/17/25 1444          Gait/Stairs (Locomotion)    [place] Level (Gait) minimum assist (75% patient effort)  -PC (r) CB (t) PC (c)     Assistive Device (Gait) walker, front-wheeled  -PC (r) CB (t) PC (c)     Distance in Feet (Gait) 30  -PC (r) CB (t) PC (c)     Deviations/Abnormal Patterns (Gait) weight shifting decreased;base of support, wide;eli decreased;gait speed decreased  -PC (r) CB (t) PC (c)     Bilateral Gait Deviations forward flexed posture;heel strike decreased  -PC (r) CB (t) PC (c)     Comment, (Gait/Stairs) Ambulated to outside of doorway and back to chair, pt has thoracic kyphosis and wide ADRIEL.  -PC (r) CB (t) PC (c)               User Key  (r) = Recorded By, (t) = Taken By, (c) = Cosigned By      Initials Name Provider Type    PC Denise Espinal, PT Physical Therapist    Charlene Mora, PT Student PT Student                   Obj/Interventions       Row Name 03/17/25 1450          Motor Skills    Therapeutic Exercise --  AP, LAQ x5 reps  -PC (r) CB (t) PC (c)               User Key  (r) = Recorded By, (t) = Taken By, (c) = Cosigned By      Initials Name Provider Type    PC Denise Espinal, PT Physical Therapist    Charlene Mora, PT Student PT Student                   Goals/Plan    No documentation.                  Clinical Impression       Row Name 03/17/25 1451          Pain    Pretreatment Pain Rating 0/10 - no pain  -PC (r) CB (t) PC (c)     Posttreatment Pain Rating 0/10 - no pain  -PC (r) CB (t) PC (c)       Row Name 03/17/25 1451          Plan of Care Review    Plan of Care Reviewed With patient  -PC (r) CB (t) PC (c)     Outcome Evaluation Pt was alert and oriented, motivated to do PT. Pt  continues to have difficulty ambulating, pt was able to ambulate 30' with Shakeel, continues to have a thoracic kyphotic posture and tends to walk with a wide ADRIEL. Pt is at a high risk for falls due to balance deficits and lack of strength and endurance. Pt required Shakeel for STS. Pt will continue to benefit from skilled PT to continue to improve strength and endurance. Pt would benefit from rehab at d/c. PT will continue to follow.  -PC (r) CB (t) PC (c)       Row Name 03/17/25 1457          Positioning and Restraints    Pre-Treatment Position sitting in chair/recliner  -PC (r) CB (t) PC (c)     Post Treatment Position chair  -PC (r) CB (t) PC (c)     In Chair reclined;sitting;call light within reach;encouraged to call for assist;exit alarm on  -PC (r) CB (t) PC (c)               User Key  (r) = Recorded By, (t) = Taken By, (c) = Cosigned By      Initials Name Provider Type    PC Denise Espinal, PT Physical Therapist    Charlene Mora, PT Student PT Student                   Outcome Measures       Row Name 03/17/25 1459 03/17/25 1153       How much help from another person do you currently need...    Turning from your back to your side while in flat bed without using bedrails? 2  -PC (r) CB (t) PC (c) 2  -EF    Moving from lying on back to sitting on the side of a flat bed without bedrails? 2  -PC (r) CB (t) PC (c) 2  -EF    Moving to and from a bed to a chair (including a wheelchair)? 2  -PC (r) CB (t) PC (c) 2  -EF    Standing up from a chair using your arms (e.g., wheelchair, bedside chair)? 3  -PC (r) CB (t) PC (c) 2  -EF    Climbing 3-5 steps with a railing? 2  -PC (r) CB (t) PC (c) 2  -EF    To walk in hospital room? 2  -PC (r) CB (t) PC (c) 1  -EF    AM-PAC 6 Clicks Score (PT) 13  -PC (r) CB (t) 11  -EF    Highest Level of Mobility Goal 4 --> Transfer to chair/commode  -PC (r) CB (t) 4 --> Transfer to chair/commode  -EF      Row Name 03/17/25 0800          How much help from another person do you currently  need...    Turning from your back to your side while in flat bed without using bedrails? 2  -EF     Moving from lying on back to sitting on the side of a flat bed without bedrails? 2  -EF     Moving to and from a bed to a chair (including a wheelchair)? 2  -EF     Standing up from a chair using your arms (e.g., wheelchair, bedside chair)? 2  -EF     Climbing 3-5 steps with a railing? 2  -EF     To walk in hospital room? 1  -EF     AM-PAC 6 Clicks Score (PT) 11  -EF     Highest Level of Mobility Goal 4 --> Transfer to chair/commode  -EF       Row Name 03/17/25 1459          Modified Berks Scale    Modified Berks Scale 4 - Moderately severe disability.  Unable to walk without assistance, and unable to attend to own bodily needs without assistance.  -PC (r) CB (t) PC (c)       Row Name 03/17/25 1459 03/17/25 1434       Functional Assessment    Outcome Measure Options AM-PAC 6 Clicks Basic Mobility (PT)  -PC (r) CB (t) PC (c) AM-PAC 6 Clicks Daily Activity (OT)  -ANAHI              User Key  (r) = Recorded By, (t) = Taken By, (c) = Cosigned By      Initials Name Provider Type    Alisia Nowak, OTR Occupational Therapist    Denise Arrington, PT Physical Therapist    Jose Garrett, RN Registered Nurse    Charlene Mora, PT Student PT Student                                 Physical Therapy Education       Title: PT OT SLP Therapies (In Progress)       Topic: Physical Therapy (Done)       Point: Mobility training (Done)       Learning Progress Summary            Patient Acceptance, E, NR,VU by AFRICA at 3/17/2025 1500    Acceptance, TB,E, NR by MB at 3/16/2025 0635    Acceptance, E,TB, NR by MB at 3/15/2025 0652    Acceptance, E, NR by  at 3/14/2025 0932    Acceptance, E,TB, NR by MB at 3/14/2025 0657    Acceptance, E, NR by  at 3/12/2025 1400                      Point: Home exercise program (Done)       Learning Progress Summary            Patient Acceptance, E, NR,VU by AFRICA at 3/17/2025 1500    Acceptance, TB,E,  NR by MB at 3/16/2025 0635    Acceptance, E,TB, NR by MB at 3/15/2025 0652    Acceptance, E, NR by  at 3/14/2025 0932    Acceptance, E,TB, NR by MB at 3/14/2025 0657    Acceptance, E, NR by  at 3/12/2025 1400                      Point: Body mechanics (Done)       Learning Progress Summary            Patient Acceptance, E, NR,VU by CB at 3/17/2025 1500    Acceptance, TB,E, NR by MB at 3/16/2025 0635    Acceptance, E,TB, NR by MB at 3/15/2025 0652    Acceptance, E, NR by  at 3/14/2025 0932    Acceptance, E,TB, NR by MB at 3/14/2025 0657                      Point: Precautions (Done)       Learning Progress Summary            Patient Acceptance, E, NR,VU by CB at 3/17/2025 1500    Acceptance, TB,E, NR by MB at 3/16/2025 0635    Acceptance, E,TB, NR by MB at 3/15/2025 0652    Acceptance, E, NR by  at 3/14/2025 0932    Acceptance, E,TB, NR by MB at 3/14/2025 0657                                      User Key       Initials Effective Dates Name Provider Type Discipline     06/16/21 -  Alisia Luna, PT Physical Therapist PT    MB 07/24/24 -  Jose Enrique Marino LPN Licensed Nurse Nurse     03/11/25 -  Charlene Staley PT Student PT Student PT                  PT Recommendation and Plan     Outcome Evaluation: Pt was alert and oriented, motivated to do PT. Pt continues to have difficulty ambulating, pt was able to ambulate 30' with Shakeel, continues to have a thoracic kyphotic posture and tends to walk with a wide ADRIEL. Pt is at a high risk for falls due to balance deficits and lack of strength and endurance. Pt required Shakeel for STS. Pt will continue to benefit from skilled PT to continue to improve strength and endurance. Pt would benefit from rehab at d/c. PT will continue to follow.     Time Calculation:         PT Charges       Row Name 03/17/25 1501             Time Calculation    Start Time 1431  -PC (r) CB (t) PC (c)      Stop Time 1441  -PC (r) CB (t) PC (c)      Time Calculation (min) 10 min  -PC (r) CB (t)       PT Received On 03/17/25  -PC (r) CB (t) PC (c)      PT - Next Appointment 03/18/25  -PC (r) CB (t) PC (c)         Time Calculation- PT    Total Timed Code Minutes- PT 10 minute(s)  -PC (r) CB (t) PC (c)         Timed Charges    77438 - PT Therapeutic Exercise Minutes -- (P)   -CB      23350 - PT Therapeutic Activity Minutes 10 (P)   -CB         Total Minutes    Timed Charges Total Minutes 10  -PC (r) CB (t)       Total Minutes 10 (P)   -CB                User Key  (r) = Recorded By, (t) = Taken By, (c) = Cosigned By      Initials Name Provider Type    PC Denise Espinal, PT Physical Therapist    Charlene Mora, PT Student PT Student                      PT G-Codes  Outcome Measure Options: AM-PAC 6 Clicks Basic Mobility (PT)  AM-PAC 6 Clicks Score (PT): 13  AM-PAC 6 Clicks Score (OT): 14  Modified Milam Scale: 4 - Moderately severe disability.  Unable to walk without assistance, and unable to attend to own bodily needs without assistance.  PT Discharge Summary  Anticipated Discharge Disposition (PT): skilled nursing facility    Charlene Staley PT Student  3/17/2025

## 2025-03-17 NOTE — PLAN OF CARE
Goal Outcome Evaluation:  Plan of Care Reviewed With: patient           Outcome Evaluation: Re-eval due to 3/16 xfer to CCU with Afib and hypotension.   Chest CT shows L LL infiltrate. Surgery following, ? Ileus. Pt agreeable to working with OT and his response to most questions is reporting he has a stuffy nose.  Pt is up to min A to move to EOB, work on sit to stand a few times with cueing each time for hand placement.  Cueing  throughout session to keep eyes open and head lifted.  RN states OK for activity and discuss BP parameters (goal is MAP >65).   Pt demo flexed posture with sitting and standing and xfer to recliner.  Once sitting in chair pt able to elise socks.  When discussing toileting pt reports not always aware of need to void and using brief and urinal. Will cont to follow for skilled OT and agree with plan for SNU due to decrease insight, cueing with tasks needed and pt a fall risk. Goals revised as needed.    Anticipated Discharge Disposition (OT): skilled nursing facility

## 2025-03-17 NOTE — CONSULTS
"Nutrition Services    Patient Name:  Carlito Mo  YOB: 1955  MRN: [id number]  Admit Date:  3/10/2025    Assessment Date:  03/17/25    NUTRITION SCREENING      Reason for Encounter BMI, Length of Stay   Diagnosis/Problem Hx: ESRD on HD MWF, CAD, DM 2, COPD, heart failure, hx of stroke    Pt admitted to [facility] with generalized weakness and a fall. CT AP 3/16 showed mild thickening in the sigmoid colon and rectum. RD visited pt at bedside. Pt says he hasn't been eating well, but nursing documented 100% of most meals consumed this admission (CBORD reviewed, meals are appropriately sized). Pt drinks ONS at home when his wife buys them. Pt agreed to chocolate ONS this admission - noted high-normal K+ earlier this admission, pt on K+ diet restriction. Boost glucose control contains 170 mg potassium per shakes, will trial this and monitor K+. Pt reported allergy to onion but was unable to report his reaction upon consumption. Pt said he avoids them but that they don't need to be added to his EMR as an allergy. NFPE completed, consistent with nutrition diagnosis of malnutrition using AND/ASPEN criteria. See MSA below.          PO Diet Diet: Cardiac, Diabetic, Renal; Low Sodium (2g); Consistent Carbohydrate; Low Sodium (2-3g), Low Potassium; Fluid Consistency: Thin (IDDSI 0)   Supplements Boost Glucose Control TID (Provides 570 kcals, 48 g protein if consumed) - chocolate    PO Intake % 100%       Medications MAR reviewed by RD   Labs  Listed below, reviewed   Physical Findings No documented edema    Room air   GI Function Liquid BM 3/17   Skin Status Intact        Height  Weight  BMI  Weight Trend     Height: 182.9 cm (72\")  Weight: 63 kg (139 lb) (03/16/25 1248)  Body mass index is 18.85 kg/m².  Stable, Amount/Timeframe: since May 2024       Nutrition Problem (PES) Moderate chronic disease related malnutrition related to ESRD on HD, COPD, heart failure as evidenced by moderate muscle and fat wasting per NFPE.  "       Intervention/Plan RD added Boost Glucose Control TID (Provides 570 kcals, 48 g protein if consumed) - chocolate. Encourage good PO intake. Monitor K+ level.     RD to follow up per protocol.     Results from last 7 days   Lab Units 03/17/25  0427 03/16/25  1138 03/16/25  0301 03/11/25  0500 03/10/25  1854   SODIUM mmol/L 136 138 139   < > 133*   POTASSIUM mmol/L 4.0 4.1  4.1 4.1   < > 5.2   CHLORIDE mmol/L 100 101 99   < > 96*   CO2 mmol/L 20.0* 22.6 22.0   < > 29.3*   BUN mg/dL 58* 51* 43*   < > 48*   CREATININE mg/dL 4.34* 3.51* 2.96*   < > 3.78*   CALCIUM mg/dL 8.2* 8.4* 9.0   < > 9.4   BILIRUBIN mg/dL  --  0.3  --   --  0.4   ALK PHOS U/L  --  104  --   --  104   ALT (SGPT) U/L  --  17  --   --  16   AST (SGOT) U/L  --  18  --   --  17   GLUCOSE mg/dL 141* 140* 219*   < > 332*    < > = values in this interval not displayed.     Results from last 7 days   Lab Units 03/17/25  0427 03/16/25  1151 03/16/25  1138 03/15/25  0409 03/14/25  0414   MAGNESIUM mg/dL  --   --  1.8  --  1.9   PHOSPHORUS mg/dL  --   --   --   --  3.2   HEMOGLOBIN g/dL 8.7*   < >  --    < > 8.7*   HEMATOCRIT % 27.0*   < >  --    < > 28.5*   TRIGLYCERIDES mg/dL  --   --  55  --   --     < > = values in this interval not displayed.     Lab Results   Component Value Date    HGBA1C 7.10 (H) 03/10/2025     Malnutrition Severity Assessment      Patient meets criteria for : Moderate (non-severe) Malnutrition  Malnutrition Type (Last 8 Hours)       Malnutrition Severity Assessment       Row Name 03/17/25 1240       Malnutrition Severity Assessment    Malnutrition Type Chronic Disease - Related Malnutrition      Row Name 03/17/25 1240       Muscle Loss    Loss of Muscle Mass Findings Moderate    [place] Region Moderate - slight depression    Clavicle Bone Region Moderate - some protrusion in females, visible in males    Acromion Bone Region Moderate - acromion may slightly protrude    Patellar Region Moderate - patella more prominent, less muscle  definition around patella    Anterior Thigh Region Moderate - mild depression on inner thigh    Posterior Calf Region Moderate - some roundness, slight firmness      Row Name 03/17/25 1240       Fat Loss    Subcutaneous Fat Loss Findings Moderate    Orbital Region  Moderate -  somewhat hollowness, slightly dark circles    Upper Arm Region Moderate - some fat tissue, not ample      Row Name 03/17/25 1240       Criteria Met (Must meet criteria for severity in at least 2 of these categories: M Wasting, Fat Loss, Fluid, Secondary Signs, Wt. Status, Intake)    Patient meets criteria for  Moderate (non-severe) Malnutrition                           Electronically signed by:  Kristin Galicia RD  03/17/25 12:28 EDT

## 2025-03-17 NOTE — THERAPY RE-EVALUATION
Patient Name: Carlito Mo  : 1955    MRN: [id number]                              Today's Date: 3/17/2025       Admit Date: 3/10/2025    Visit Dx:     ICD-10-CM ICD-9-CM   1. Generalized weakness  R53.1 780.79   2. ESRD on dialysis  N18.6 585.6    Z99.2 V45.11   3. Fall, initial encounter  W19.XXXA E888.9     Patient Active Problem List   Diagnosis    Major depressive disorder with single episode, in partial remission    Other hyperlipidemia    Type 2 diabetes mellitus, with long-term current use of insulin    Vitamin D deficiency    Erectile dysfunction    Chronic fatigue    Type 2 diabetes mellitus with ophthalmic complication    Benign prostatic hyperplasia with nocturia    History of basal cell carcinoma    Acquired hypothyroidism    Recurrent strokes    Suspected sleep apnea    YAW (obstructive sleep apnea)    Coronary artery disease involving native coronary artery of native heart without angina pectoris    Chronically elevated troponin    Colon cancer screening    Enlarged lymph nodes    Functional gait abnormality    History of myocardial infarction    Insomnia    Iron deficiency anemia    Major depression, recurrent    Essential hypertension    Acute on chronic renal insufficiency    Falls frequently    Acute on chronic anemia    Neurogenic orthostatic hypotension    Anemia, chronic disease    History of colon polyps    Helicobacter pylori gastritis    Esophagitis    Embolic stroke    Patent foramen ovale    Cardiomyopathy    Diarrhea    Generalized weakness    Paroxysmal atrial fibrillation    Chronic anticoagulation    Acute ischemic stroke    History of stroke    Iron deficiency anemia    Acute HFrEF (heart failure with reduced ejection fraction)    ESRD (end stage renal disease)    Hemoptysis    Chronic HFrEF (heart failure with reduced ejection fraction)    Hemodialysis patient    Exertional dyspnea    Severe malnutrition    Ataxia    Cerebellar stroke, acute    Medically noncompliant     Vertebral artery stenosis, bilateral    Carotid stenosis, right    Ileus     Past Medical History:   Diagnosis Date    Acquired hypothyroidism     Acute sinusitis     SYLVAIN (acute kidney injury)     on CKD    Anemia     Arthritis     Atrial fibrillation     CAD (coronary artery disease)     Chronic combined systolic and diastolic congestive heart failure     COPD (chronic obstructive pulmonary disease)     Depression     Diabetic neuropathy 04/11/2022    Diabetes mellitus due to underlying condition    Esophagitis 06/10/2020    Added automatically from request for surgery [id number]      ESRD (end stage renal disease) 12/01/2023    Frequent PVCs     Generalized weakness     GERD (gastroesophageal reflux disease)     Helicobacter pylori gastritis 06/04/2020    Hyperlipidemia     Hypertension     Hypertensive encephalopathy     Pleural effusion     Pneumonia     Poor historian     RBBB (right bundle branch block)     Stroke     POOR VISION    TIA (transient ischemic attack)     Type 2 diabetes mellitus     Urinary retention     Vitamin D deficiency      Past Surgical History:   Procedure Laterality Date    APPENDECTOMY N/A 02/14/2016    Dr. Alexey Dodson    ARTERIOVENOUS FISTULA/SHUNT SURGERY Right 06/03/2022    Procedure: RIGHT FOREARM ARTERIOVENOUS FISTULA PLACEMENT RADIOCEPHALIC WITH CEPHALIC VEIN TRANSPOSITION;  Surgeon: Eliseo Willis MD;  Location: [facility] MAIN OR;  Service: Vascular;  Laterality: Right;    COLONOSCOPY      over 20 years ago.  no polyps     COLONOSCOPY N/A 09/18/2018    Procedure: COLONOSCOPY;  Surgeon: Jose Enrique Louie MD;  Location: [facility] ENDOSCOPY;  Service: Gastroenterology    COLONOSCOPY N/A 09/19/2018    IH, EH, polyps (TAs w/low grade dysplasia)    COLONOSCOPY N/A 06/01/2020    Procedure: COLONOSCOPY;  Surgeon: Jose Enrique Louie MD;  Location: [facility] ENDOSCOPY;  Service: Gastroenterology;  Laterality: N/A;  Pre op-Anemia, Melena, History of Polyps  Post op-To Cecum/TI, Polyp,  Poor Prep    CORONARY ARTERY BYPASS GRAFT  11/2001    ENDOSCOPY N/A 05/29/2020    Procedure: ESOPHAGOGASTRODUODENOSCOPY with biopsies;  Surgeon: Amanda Lovelace MD;  Location: [facility] ENDOSCOPY;  Service: Gastroenterology;  Laterality: N/A;  pre-anemia, dark stools  post-esophagitis, hiatal hernia    ENDOSCOPY N/A 09/15/2020    Procedure: ESOPHAGOGASTRODUODENOSCOPY WITH BIOPSIES;  Surgeon: Jose Enrique Louie MD;  Location: [facility] ENDOSCOPY;  Service: Gastroenterology;  Laterality: N/A;  pre: history of melena and esophagitis  post: mild esophagitis and gastritis, small hiatal hernia    ENDOSCOPY N/A 12/4/2023    Procedure: ESOPHAGOGASTRODUODENOSCOPY with bx;  Surgeon: Quoc Elmore MD;  Location: [facility] ENDOSCOPY;  Service: Gastroenterology;  Laterality: N/A;  pre: Coffee-ground emesis  post: hiatal hernia, esophagitis    HERNIA REPAIR Left 12/05/2019    THORACENTESIS      TONSILLECTOMY      VASECTOMY        General Information       Row Name 03/17/25 7684          OT Time and Intention    Subjective Information complains of;weakness  c/o stuffy nose.  -LE     Document Type therapy note (daily note);re-evaluation  -LE     Mode of Treatment individual therapy;occupational therapy  -LE     Patient Effort good  -LE     Symptoms Noted During/After Treatment fatigue  -LE       Row Name 03/17/25 4807          General Information    Patient Profile Reviewed yes  -LE     Prior Level of Function --  pt vague regarding questions about prior  level of function.  -LE     Existing Precautions/Restrictions fall  -LE     Barriers to Rehab medically complex  -LE       Row Name 03/17/25 8852          Living Environment    Current Living Arrangements home  -LE     People in Home child(carlos), adult;spouse  -LE       Row Name 03/17/25 5327          Cognition    Orientation Status (Cognition) oriented to;person;place;situation;time  -LE       Row Name 03/17/25 8591          Safety Issues/Impairments Affecting Functional  Mobility    Safety Issues Affecting Function (Mobility) judgment;insight into deficits/self-awareness;problem-solving  -LE     Impairments Affecting Function (Mobility) balance;endurance/activity tolerance;postural/trunk control;strength  -LE     Comment, Safety Issues/Impairments (Mobility) Non skid socks and gait belt worn  -LE               User Key  (r) = Recorded By, (t) = Taken By, (c) = Cosigned By      Initials Name Provider Type    Alisia Nowak OTR Occupational Therapist                     Mobility/ADL's       Row Name 03/17/25 [facility]7          Bed Mobility    Bed Mobility supine-sit;sit-supine;scooting/bridging  -LE     Supine-Sit Hardy (Bed Mobility) set up;verbal cues;minimum assist (75% patient effort)  -LE     Assistive Device (Bed Mobility) bed rails;head of bed elevated  -LE     Comment, (Bed Mobility) increase effort.  -LE       Row Name 03/17/25 [facility]7          Transfers    Transfers sit-stand transfer;stand-sit transfer;bed-chair transfer;toilet transfer  -LE       Row Name 03/17/25 [facility]          Bed-Chair Transfer    Bed-Chair Hardy (Transfers) set up;verbal cues;contact guard  -LE     Assistive Device (Bed-Chair Transfers) walker, front-wheeled  -LE       Row Name 03/17/25 [facility]7          Sit-Stand Transfer    Sit-Stand Hardy (Transfers) set up;verbal cues;minimum assist (75% patient effort);contact guard  -LE     Assistive Device (Sit-Stand Transfers) walker, front-wheeled  -LE       Row Name 03/17/25 [facility]          Stand-Sit Transfer    Stand-Sit Hardy (Transfers) set up;verbal cues;minimum assist (75% patient effort);contact guard  -LE     Assistive Device (Stand-Sit Transfers) walker, front-wheeled  -LE     Comment, (Stand-Sit Transfer) VC for hand placement each transfer.  -LE       Row Name 03/17/25 [facility]          Functional Mobility    Functional Mobility- Comment few steps to chair with walker and CGA/Min A  -LE       Row Name 03/17/25 [facility]          Activities of  Daily Living    BADL Assessment/Intervention toileting;feeding;grooming;lower body dressing;upper body dressing;bathing  -       Row Name 03/17/25 1357          Lower Body Dressing Assessment/Training    [place] Level (Lower Body Dressing) don;socks;set up;verbal cues  -LE     Position (Lower Body Dressing) supported sitting  -       Row Name 03/17/25 1357          Self-Feeding Assessment/Training    Comment, (Feeding) set up per RN.  -       Row Name 03/17/25 1357          Toileting Assessment/Training    [place] Level (Toileting) maximum assist (25% patient effort)  -LE     Comment, (Toileting) offer to use urinal.  bowel incontinent per RN (uses brief)  -LE               User Key  (r) = Recorded By, (t) = Taken By, (c) = Cosigned By      Initials Name Provider Type    Alisia Nowak OTR Occupational Therapist                   Obj/Interventions       Row Name 03/17/25 1430          Range of Motion Comprehensive    General Range of Motion bilateral upper extremity ROM WFL  -       Row Name 03/17/25 1430          Strength Comprehensive (MMT)    Comment, General Manual Muscle Testing (MMT) Assessment B UE grossy 4-/5.  -       Row Name 03/17/25 1430          Balance    Balance Assessment sitting static balance;standing static balance;standing dynamic balance  -LE     Static Sitting Balance standby assist  -LE     Position, Sitting Balance sitting edge of bed  -LE     Static Standing Balance contact guard  -LE     Dynamic Standing Balance minimal assist  -LE     Position/Device Used, Standing Balance walker, front-wheeled  -LE     Comment, Balance flexed posture seated and standing.   continual cues to keep head up and eyes open.  -LE               User Key  (r) = Recorded By, (t) = Taken By, (c) = Cosigned By      Initials Name Provider Type    Alisia Nowak OTR Occupational Therapist                   Goals/Plan       Row Name 03/17/25 1433          Bed Mobility Goal 1 (OT)     Activity/Assistive Device (Bed Mobility Goal 1, OT) supine to sit;sit to supine  -LE     [place] Level/Cues Needed (Bed Mobility Goal 1, OT) modified independence  -LE     Time Frame (Bed Mobility Goal 1, OT) 2 weeks  -LE     Progress/Outcomes (Bed Mobility Goal 1, OT) goal revised this date  -LE       Row Name 03/17/25 1433          Transfer Goal 1 (OT)    Activity/Assistive Device (Transfer Goal 1, OT) sit-to-stand/stand-to-sit;bed-to-chair/chair-to-bed;toilet;walker, rolling;commode, 3-in-1  -LE     [place] Level/Cues Needed (Transfer Goal 1, OT) verbal cues required;standby assist  -LE     Time Frame (Transfer Goal 1, OT) 2 weeks  -LE     Progress/Outcome (Transfer Goal 1, OT) goal revised this date  -LE       Row Name 03/17/25 1433          Bathing Goal 1 (OT)    Activity/Device (Bathing Goal 1, OT) bathing skills, all  -LE     [place] Level/Cues Needed (Bathing Goal 1, OT) standby assist;set-up required;nonverbal cues (demo/gesture) required  -LE     Time Frame (Bathing Goal 1, OT) 2 weeks  -LE     Progress/Outcomes (Bathing Goal 1, OT) goal revised this date  -LE       Row Name 03/17/25 1433          Dressing Goal 1 (OT)    Activity/Device (Dressing Goal 1, OT) dressing skills, all  -LE     [place]/Cues Needed (Dressing Goal 1, OT) standby assist;set-up required;contact guard required  including item retrieval.  -LE     Time Frame (Dressing Goal 1, OT) 2 weeks  -LE     Progress/Outcome (Dressing Goal 1, OT) goal ongoing  -LE       Row Name 03/17/25 1433          Toileting Goal 1 (OT)    Activity/Device (Toileting Goal 1, OT) toileting skills, all  -LE     [place] Level/Cues Needed (Toileting Goal 1, OT) standby assist;tactile cues required;contact guard required  -LE     Time Frame (Toileting Goal 1, OT) 2 weeks  -LE     Progress/Outcome (Toileting Goal 1, OT) goal revised this date  -LE       Row Name 03/17/25 1433          Grooming Goal 1 (OT)    Progress/Outcome (Grooming Goal 1,  "OT) goal ongoing  -LE       Row Name 03/17/25 1433          Therapy Assessment/Plan (OT)    Planned Therapy Interventions (OT) activity tolerance training;adaptive equipment training;BADL retraining;cognitive/visual perception retraining;functional balance retraining;patient/caregiver education/training;transfer/mobility retraining  -LE               User Key  (r) = Recorded By, (t) = Taken By, (c) = Cosigned By      Initials Name Provider Type    Alisia Nowak OTR Occupational Therapist                   Clinical Impression       Row Name 03/17/25 1431          Pain Assessment    Pretreatment Pain Rating 0/10 - no pain  -LE     Pre/Posttreatment Pain Comment when asked about pain pt replies \"my nose is stuffy\"  -LE       Row Name 03/17/25 1431          Plan of Care Review    Plan of Care Reviewed With patient  -LE     Outcome Evaluation Re-eval due to 3/16 xfer to CCU with Afib and hypotension.   Chest CT shows L LL infiltrate. Surgery following, ? Ileus. Pt agreeable to working with OT and his response to most questions is reporting he has a stuffy nose.  Pt is up to min A to move to EOB, work on sit to stand a few times with cueing each time for hand placement.  Cueing  throughout session to keep eyes open and head lifted.  RN states OK for activity and discuss BP parameters (goal is MAP >65).   Pt demo flexed posture with sitting and standing and xfer to recliner.  Once sitting in chair pt able to elise socks.  When discussing toileting pt reports not always aware of need to void and using brief and urinal. Will cont to follow for skilled OT and agree with plan for SNU due to decrease insight, cueing with tasks needed and pt a fall risk. Goals revised as needed.  -LE       Row Name 03/17/25 1431          Therapy Assessment/Plan (OT)    Rehab Potential (OT) good  -LE     Criteria for Skilled Therapeutic Interventions Met (OT) yes;meets criteria  -LE     Therapy Frequency (OT) 5 times/wk  -LE       Row Name " 03/17/25 1431          Therapy Plan Review/Discharge Plan (OT)    Equipment Needs Upon Discharge (OT) --  walker, 3in1,tub seat.  -LE     Anticipated Discharge Disposition (OT) skilled nursing facility  -       Row Name 03/17/25 1431          Vital Signs    Pre Systolic BP Rehab 144  -LE     Pre Treatment Diastolic BP 67  supine, prior to getting up.  -LE     Intra Systolic BP Rehab 127  -LE     Intra Treatment Diastolic BP 67  when sit EOB.  OK to cont activity per RN as long as MAP >65  -LE     Post Systolic BP Rehab 108  -LE     Post Treatment Diastolic BP 82  -LE     O2 Delivery Pre Treatment room air  -LE     O2 Delivery Intra Treatment room air  -LE     O2 Delivery Post Treatment room air  -LE     Pre Patient Position Supine  -LE     Intra Patient Position Standing  -LE     Post Patient Position Sitting  -LE       Row Name 03/17/25 1431          Positioning and Restraints    Pre-Treatment Position in bed  -LE     Post Treatment Position chair  -LE     In Chair notified nsg;reclined;call light within reach;encouraged to call for assist;exit alarm on  -LE               User Key  (r) = Recorded By, (t) = Taken By, (c) = Cosigned By      Initials Name Provider Type    Alisia Nowak, OTR Occupational Therapist                   Outcome Measures       Row Name 03/17/25 1434          How much help from another is currently needed...    Putting on and taking off regular lower body clothing? 2  -LE     Bathing (including washing, rinsing, and drying) 2  -LE     Toileting (which includes using toilet bed pan or urinal) 2  -LE     Putting on and taking off regular upper body clothing 2  -LE     Taking care of personal grooming (such as brushing teeth) 3  -LE     Eating meals 3  -LE     AM-PAC 6 Clicks Score (OT) 14  -LE       Row Name 03/17/25 1153 03/17/25 0800       How much help from another person do you currently need...    Turning from your back to your side while in flat bed without using bedrails? 2  -EF 2   -EF    Moving from lying on back to sitting on the side of a flat bed without bedrails? 2  -EF 2  -EF    Moving to and from a bed to a chair (including a wheelchair)? 2  -EF 2  -EF    Standing up from a chair using your arms (e.g., wheelchair, bedside chair)? 2  -EF 2  -EF    Climbing 3-5 steps with a railing? 2  -EF 2  -EF    To walk in hospital room? 1  -EF 1  -EF    AM-PAC 6 Clicks Score (PT) 11  -EF 11  -EF    Highest Level of Mobility Goal 4 --> Transfer to chair/commode  -EF 4 --> Transfer to chair/commode  -EF      Row Name 03/17/25 1434          Functional Assessment    Outcome Measure Options AM-PAC 6 Clicks Daily Activity (OT)  -               User Key  (r) = Recorded By, (t) = Taken By, (c) = Cosigned By      Initials Name Provider Type    Alisia Nowak OTR Occupational Therapist    Jose Garrett, RN Registered Nurse                    Occupational Therapy Education       Title: PT OT SLP Therapies (In Progress)       Topic: Occupational Therapy (In Progress)       Point: ADL training (Done)       Learning Progress Summary            Patient Acceptance, E, Bed IU by ANAHI at 3/17/2025 1435    Comment: role of OT, goal to eval for change on status since xfer to CCU.  ed on transfer tech.                      Point: Precautions (Done)       Learning Progress Summary            Patient Acceptance, E, Bed IU by ANAHI at 3/17/2025 1435    Comment: role of OT, goal to eval for change on status since xfer to CCU.  ed on transfer tech.                      Point: Body mechanics (Done)       Learning Progress Summary            Patient Acceptance, E, Bed IU by ANAHI at 3/17/2025 1435    Comment: role of OT, goal to eval for change on status since xfer to CCU.  ed on transfer tech.                                      User Key       Initials Effective Dates Name Provider Type Discipline    ANAHI 06/16/21 -  Alisia Barlow OTR Occupational Therapist OT                  OT Recommendation and Plan  Planned Therapy  Interventions (OT): activity tolerance training, adaptive equipment training, BADL retraining, cognitive/visual perception retraining, functional balance retraining, patient/caregiver education/training, transfer/mobility retraining  Therapy Frequency (OT): 5 times/wk  Plan of Care Review  Plan of Care Reviewed With: patient  Outcome Evaluation: Re-eval due to 3/16 xfer to CCU with Afib and hypotension.   Chest CT shows L LL infiltrate. Surgery following, ? Ileus. Pt agreeable to working with OT and his response to most questions is reporting he has a stuffy nose.  Pt is up to min A to move to EOB, work on sit to stand a few times with cueing each time for hand placement.  Cueing  throughout session to keep eyes open and head lifted.  RN states OK for activity and discuss BP parameters (goal is MAP >65).   Pt demo flexed posture with sitting and standing and xfer to recliner.  Once sitting in chair pt able to elise socks.  When discussing toileting pt reports not always aware of need to void and using brief and urinal. Will cont to follow for skilled OT and agree with plan for SNU due to decrease insight, cueing with tasks needed and pt a fall risk. Goals revised as needed.     Time Calculation:         Time Calculation- OT       Row Name 03/17/25 1437             Time Calculation- OT    OT Start Time 1347  -LE      OT Stop Time 1415  -LE      OT Time Calculation (min) 28 min  -LE      Total Timed Code Minutes- OT 15 minute(s)  -LE      OT Received On 03/17/25  -LE      OT - Next Appointment 03/18/25  -LE      OT Goal Re-Cert Due Date 03/31/25  -LE         Timed Charges    93756 - OT Therapeutic Activity Minutes 15  -LE         Total Minutes    Timed Charges Total Minutes 15  -LE       Total Minutes 15  -LE                User Key  (r) = Recorded By, (t) = Taken By, (c) = Cosigned By      Initials Name Provider Type    Alisia Nowak OTR Occupational Therapist                  Therapy Charges for Today       Code  Description Service Date Service Provider Modifiers Qty    [id number]  OT THERAPEUTIC ACT EA 15 MIN 3/17/2025 Alisia Barlow OTR GO 1    [id number]  OT RE-EVAL 2 3/17/2025 Alisia Barlow OTR GO 1                 FADI Pepe  3/17/2025

## 2025-03-17 NOTE — PROGRESS NOTES
[facility]  [phone]  Dr. Jhonatan Carloina    Subjective:  LOS: 0    Chief Complaint: Hypotension and A-fib    Now he is normotensive and heart rate controlled with sinus rhythm.  Denies any complaints.  No cough phlegm fever chills rigors.    Objective   Vital Signs past 24hrs  Temp range: Temp (24hrs), Av.4 °F (36.9 °C), Min:97.7 °F (36.5 °C), Max:99.1 °F (37.3 °C)    BP range: BP: ()/(35-79) 127/54  Pulse range: Heart Rate:  [] 67  Resp rate range: Resp:  [18-20] 19  Device (Oxygen Therapy): room air   Oxygen range:SpO2:  [95 %-100 %] 99 %   Mechanical Ventilator:     Physical Exam  Eyes:      Pupils: Pupils are equal, round, and reactive to light.   Cardiovascular:      Rate and Rhythm: Normal rate and regular rhythm.      Heart sounds: No murmur heard.  Pulmonary:      Effort: Pulmonary effort is normal.      Breath sounds: Normal breath sounds.   Abdominal:      General: Bowel sounds are normal.      Palpations: Abdomen is soft. There is no mass.      Tenderness: There is no abdominal tenderness.   Musculoskeletal:         General: No swelling.   Neurological:      Mental Status: He is alert.       Results Review:    I have reviewed the laboratory and imaging data since the last note by [facility] physician.  My annotations are noted in assessment and plan.      Result Review:  I have personally reviewed the results from last note by [facility] physician to 3/17/2025 07:14 EDT and agree with these findings:  [x]  Laboratory list / accordion  [x]  Microbiology  [x]  Radiology  []  EKG/Telemetry   []  Cardiology/Vascular   []  Pathology  []  Old records  []  Other:      Medication Review:  I have reviewed the current MAR.  My annotations are noted in assessment and plan.    ampicillin-sulbactam, 3 g, Intravenous, Q12H  apixaban, 2.5 mg, Oral, BID  aspirin, 325 mg, Oral, Daily  atorvastatin, 80 mg, Oral, Nightly  insulin lispro, 2-7 Units, Subcutaneous, 4x Daily AC & at Bedtime  levothyroxine,  75 mcg, Oral, Q AM  midodrine, 10 mg, Oral, TID AC  mupirocin, 1 Application, Each Nare, BID  pantoprazole, 40 mg, Oral, BID AC  sodium chloride, 10 mL, Intravenous, Q12H  tamsulosin, 0.4 mg, Oral, Daily        phenylephrine, 0.5-3 mcg/kg/min, Last Rate: Stopped (03/17/25 0545)      Lines, Drains & Airways       Active LDAs       Name Placement date Placement time Site Days    Peripheral IV 03/10/25 Anterior;Left;Proximal Forearm 03/10/25  --  Forearm  7    Peripheral IV 03/16/25 1106 Left Wrist 03/16/25  1106  Wrist  less than 1                    PCCM Problems  Hypotension presumably cardiogenic  Chronic hypotension on midodrine  A-fib with RVR  ESRD on HD  Type 2 diabetes mellitus  History CVA  Chronic anticoagulation for A-fib  Cardiomyopathy with EF 45%  CAD  Nausea and vomiting  Abdominal pain  RML and RLL infiltrate  Thickening of rectum and sigmoid walls      Plan of Treatment    Hypotension likely related to A-fib and now resolved.  Remains on midodrine.  Note input by cardiology and nephrology.    A-fib with RVR.  Now sinus.  Will be on amiodarone.    Next dialysis is tomorrow morning and anticipate patient will receive at this facility and then go to rehab.    Right middle lobe and right lower lobe infiltrate seen on the CT chest.  Unclear if new infection or simply sterile aspiration.  Will treat with antibiotics for 5 days.  Can switch to oral antibiotics tomorrow.    Sliding scale for diabetes.    No acute issues in the abdomen and appreciate general surgery input.  Will see if they have an opinion on the CT abdomen.    Patient can be downgraded to telemetry floor.  [facility] to resume care.    Jhonatan Carolina MD  03/17/25  07:14 EDT    Isolation status: Contact [facility]    Dietary Orders (From admission, onward)       Start     Ordered    03/16/25 1803  Diet: Cardiac, Diabetic, Renal; Low Sodium (2g); Consistent Carbohydrate; Low Sodium (2-3g), Low Potassium; Fluid Consistency: Thin (IDDSI 0)  Diet Effective Now         References:    Diet Order Definitions   Question Answer Comment   Diets: Cardiac    Diets: Diabetic    Diets: Renal    Cardiac Diet: Low Sodium (2g)    Diabetic Diet: Consistent Carbohydrate    Renal Diet: Low Sodium (2-3g)    Renal Diet: Low Potassium    Fluid Consistency: Thin (IDDSI 0)        03/16/25 8833                    Part of this note may be an electronic transcription/translation of spoken language to printed text using the Dragon Dictation System.

## 2025-03-18 LAB
ALBUMIN SERPL-MCNC: 2.9 G/DL (ref 3.5–5.2)
ANION GAP SERPL CALCULATED.3IONS-SCNC: 16.2 MMOL/L (ref 5–15)
BASOPHILS # BLD AUTO: 0.01 10*3/MM3 (ref 0–0.2)
BASOPHILS NFR BLD AUTO: 0.2 % (ref 0–1.5)
BUN SERPL-MCNC: 69 MG/DL (ref 8–23)
BUN/CREAT SERPL: 13.3 (ref 7–25)
CALCIUM SPEC-SCNC: 7.7 MG/DL (ref 8.6–10.5)
CHLORIDE SERPL-SCNC: 97 MMOL/L (ref 98–107)
CO2 SERPL-SCNC: 19.8 MMOL/L (ref 22–29)
CREAT SERPL-MCNC: 5.18 MG/DL (ref 0.76–1.27)
DEPRECATED RDW RBC AUTO: 47.4 FL (ref 37–54)
EGFRCR SERPLBLD CKD-EPI 2021: 11.3 ML/MIN/1.73
EOSINOPHIL # BLD AUTO: 0.24 10*3/MM3 (ref 0–0.4)
EOSINOPHIL NFR BLD AUTO: 4.9 % (ref 0.3–6.2)
ERYTHROCYTE [DISTWIDTH] IN BLOOD BY AUTOMATED COUNT: 14.8 % (ref 12.3–15.4)
GLUCOSE BLDC GLUCOMTR-MCNC: 114 MG/DL (ref 70–130)
GLUCOSE BLDC GLUCOMTR-MCNC: 125 MG/DL (ref 70–130)
GLUCOSE BLDC GLUCOMTR-MCNC: 136 MG/DL (ref 70–130)
GLUCOSE BLDC GLUCOMTR-MCNC: 173 MG/DL (ref 70–130)
GLUCOSE SERPL-MCNC: 98 MG/DL (ref 65–99)
HCT VFR BLD AUTO: 27.5 % (ref 37.5–51)
HGB BLD-MCNC: 8.5 G/DL (ref 13–17.7)
IMM GRANULOCYTES # BLD AUTO: 0.03 10*3/MM3 (ref 0–0.05)
IMM GRANULOCYTES NFR BLD AUTO: 0.6 % (ref 0–0.5)
LYMPHOCYTES # BLD AUTO: 0.83 10*3/MM3 (ref 0.7–3.1)
LYMPHOCYTES NFR BLD AUTO: 17 % (ref 19.6–45.3)
MCH RBC QN AUTO: 27.1 PG (ref 26.6–33)
MCHC RBC AUTO-ENTMCNC: 30.9 G/DL (ref 31.5–35.7)
MCV RBC AUTO: 87.6 FL (ref 79–97)
MONOCYTES # BLD AUTO: 0.77 10*3/MM3 (ref 0.1–0.9)
MONOCYTES NFR BLD AUTO: 15.7 % (ref 5–12)
NEUTROPHILS NFR BLD AUTO: 3.01 10*3/MM3 (ref 1.7–7)
NEUTROPHILS NFR BLD AUTO: 61.6 % (ref 42.7–76)
NRBC BLD AUTO-RTO: 0 /100 WBC (ref 0–0.2)
PHOSPHATE SERPL-MCNC: 4.1 MG/DL (ref 2.5–4.5)
PLATELET # BLD AUTO: 218 10*3/MM3 (ref 140–450)
PMV BLD AUTO: 10.4 FL (ref 6–12)
POTASSIUM SERPL-SCNC: 4.2 MMOL/L (ref 3.5–5.2)
RBC # BLD AUTO: 3.14 10*6/MM3 (ref 4.14–5.8)
SODIUM SERPL-SCNC: 133 MMOL/L (ref 136–145)
WBC NRBC COR # BLD AUTO: 4.89 10*3/MM3 (ref 3.4–10.8)

## 2025-03-18 PROCEDURE — 82948 REAGENT STRIP/BLOOD GLUCOSE: CPT

## 2025-03-18 PROCEDURE — 63710000001 INSULIN LISPRO (HUMAN) PER 5 UNITS: Performed by: NURSE PRACTITIONER

## 2025-03-18 PROCEDURE — 80069 RENAL FUNCTION PANEL: CPT | Performed by: INTERNAL MEDICINE

## 2025-03-18 PROCEDURE — 99232 SBSQ HOSP IP/OBS MODERATE 35: CPT | Performed by: NURSE PRACTITIONER

## 2025-03-18 PROCEDURE — 85025 COMPLETE CBC W/AUTO DIFF WBC: CPT | Performed by: INTERNAL MEDICINE

## 2025-03-18 RX ORDER — MIDODRINE HYDROCHLORIDE 5 MG/1
5 TABLET ORAL
Status: DISCONTINUED | OUTPATIENT
Start: 2025-03-18 | End: 2025-03-20 | Stop reason: HOSPADM

## 2025-03-18 RX ORDER — AMOXICILLIN 250 MG/1
500 CAPSULE ORAL EVERY 12 HOURS SCHEDULED
Status: DISCONTINUED | OUTPATIENT
Start: 2025-03-18 | End: 2025-03-20 | Stop reason: HOSPADM

## 2025-03-18 RX ORDER — OXYMETAZOLINE HYDROCHLORIDE 0.05 G/100ML
2 SPRAY NASAL 2 TIMES DAILY
Status: DISCONTINUED | OUTPATIENT
Start: 2025-03-18 | End: 2025-03-20 | Stop reason: HOSPADM

## 2025-03-18 RX ADMIN — INSULIN LISPRO 2 UNITS: 100 INJECTION, SOLUTION INTRAVENOUS; SUBCUTANEOUS at 22:23

## 2025-03-18 RX ADMIN — MUPIROCIN 1 APPLICATION: 20 OINTMENT TOPICAL at 22:23

## 2025-03-18 RX ADMIN — ATORVASTATIN CALCIUM 80 MG: 80 TABLET, FILM COATED ORAL at 22:23

## 2025-03-18 RX ADMIN — APIXABAN 2.5 MG: 2.5 TABLET, FILM COATED ORAL at 22:23

## 2025-03-18 RX ADMIN — MUPIROCIN 1 APPLICATION: 20 OINTMENT TOPICAL at 08:48

## 2025-03-18 RX ADMIN — OXYMETAZOLINE HYDROCHLORIDE 2 SPRAY: 0.5 SPRAY NASAL at 22:23

## 2025-03-18 RX ADMIN — Medication 10 ML: at 22:23

## 2025-03-18 RX ADMIN — ASPIRIN 325 MG: 325 TABLET, COATED ORAL at 08:49

## 2025-03-18 RX ADMIN — LEVOTHYROXINE SODIUM 75 MCG: 0.07 TABLET ORAL at 08:51

## 2025-03-18 RX ADMIN — AMIODARONE HYDROCHLORIDE 200 MG: 200 TABLET ORAL at 08:50

## 2025-03-18 RX ADMIN — AMOXICILLIN 500 MG: 250 CAPSULE ORAL at 08:49

## 2025-03-18 RX ADMIN — PANTOPRAZOLE SODIUM 40 MG: 40 TABLET, DELAYED RELEASE ORAL at 08:49

## 2025-03-18 RX ADMIN — APIXABAN 2.5 MG: 2.5 TABLET, FILM COATED ORAL at 08:49

## 2025-03-18 RX ADMIN — TAMSULOSIN HYDROCHLORIDE 0.4 MG: 0.4 CAPSULE ORAL at 08:50

## 2025-03-18 RX ADMIN — AMOXICILLIN 500 MG: 250 CAPSULE ORAL at 22:23

## 2025-03-18 RX ADMIN — PANTOPRAZOLE SODIUM 40 MG: 40 TABLET, DELAYED RELEASE ORAL at 17:38

## 2025-03-18 RX ADMIN — Medication 10 ML: at 08:51

## 2025-03-18 NOTE — PROGRESS NOTES
Enter Query Response Below      Query Response:   Non-Severe (Moderate) chronic illness related malnutrition     Electronically signed by Jacques Alonzo MD, 03/18/25, 7:14 PM EDT.               If applicable, please update the problem list.

## 2025-03-18 NOTE — PROGRESS NOTES
[facility] Clinical Pharmacy Services: Dose Adjustment    Amoxicillin has been appropriately dose adjusted based on our System P&T approved policy. Pharmacy will continue to monitor patient peripherally while in-house.     Karly Irving, PharmD  Clinical Pharmacist

## 2025-03-18 NOTE — PLAN OF CARE
Problem: Adult Inpatient Plan of Care  Goal: Plan of Care Review  Outcome: Progressing  Goal: Patient-Specific Goal (Individualized)  Outcome: Progressing  Goal: Absence of Hospital-Acquired Illness or Injury  Outcome: Progressing  Intervention: Identify and Manage Fall Risk  Recent Flowsheet Documentation  Taken 3/18/2025 0400 by Philippe Mendez RN  Safety Promotion/Fall Prevention:   safety round/check completed   fall prevention program maintained   clutter free environment maintained  Taken 3/18/2025 0200 by Philippe Mendez RN  Safety Promotion/Fall Prevention:   safety round/check completed   fall prevention program maintained   clutter free environment maintained  Taken 3/18/2025 0000 by Philippe Mendez RN  Safety Promotion/Fall Prevention:   safety round/check completed   fall prevention program maintained   clutter free environment maintained  Intervention: Prevent Skin Injury  Recent Flowsheet Documentation  Taken 3/18/2025 0400 by Philippe Mendez RN  Body Position: position changed independently  Taken 3/18/2025 0200 by Philippe Mendez RN  Body Position: position changed independently  Taken 3/18/2025 0000 by Philippe Mendez RN  Body Position: position changed independently  Intervention: Prevent Infection  Recent Flowsheet Documentation  Taken 3/18/2025 0400 by Philippe Mendez RN  Infection Prevention:   single patient room provided   rest/sleep promoted   hand hygiene promoted  Taken 3/18/2025 0200 by Philippe Mendez RN  Infection Prevention:   single patient room provided   rest/sleep promoted   hand hygiene promoted  Taken 3/18/2025 0000 by Philippe Mendez RN  Infection Prevention:   single patient room provided   rest/sleep promoted   hand hygiene promoted  Goal: Optimal Comfort and Wellbeing  Outcome: Progressing  Goal: Readiness for Transition of Care  Outcome: Progressing  Goal: Plan of Care Review  Outcome: Progressing  Goal: Patient-Specific Goal (Individualized)  Outcome:  Progressing  Goal: Absence of Hospital-Acquired Illness or Injury  Outcome: Progressing  Intervention: Identify and Manage Fall Risk  Recent Flowsheet Documentation  Taken 3/18/2025 0400 by Philippe Mendez RN  Safety Promotion/Fall Prevention:   safety round/check completed   fall prevention program maintained   clutter free environment maintained  Taken 3/18/2025 0200 by Philippe Mendez RN  Safety Promotion/Fall Prevention:   safety round/check completed   fall prevention program maintained   clutter free environment maintained  Taken 3/18/2025 0000 by Philippe Mendez RN  Safety Promotion/Fall Prevention:   safety round/check completed   fall prevention program maintained   clutter free environment maintained  Intervention: Prevent Skin Injury  Recent Flowsheet Documentation  Taken 3/18/2025 0400 by Philippe Mendez RN  Body Position: position changed independently  Taken 3/18/2025 0200 by Philippe Mendez RN  Body Position: position changed independently  Taken 3/18/2025 0000 by Philippe Mendez RN  Body Position: position changed independently  Intervention: Prevent Infection  Recent Flowsheet Documentation  Taken 3/18/2025 0400 by Philippe Mendez RN  Infection Prevention:   single patient room provided   rest/sleep promoted   hand hygiene promoted  Taken 3/18/2025 0200 by Philippe Mendez RN  Infection Prevention:   single patient room provided   rest/sleep promoted   hand hygiene promoted  Taken 3/18/2025 0000 by Philippe Mendez RN  Infection Prevention:   single patient room provided   rest/sleep promoted   hand hygiene promoted  Goal: Optimal Comfort and Wellbeing  Outcome: Progressing  Goal: Readiness for Transition of Care  Outcome: Progressing     Problem: Fall Injury Risk  Goal: Absence of Fall and Fall-Related Injury  Outcome: Progressing  Intervention: Identify and Manage Contributors  Recent Flowsheet Documentation  Taken 3/18/2025 0400 by Philippe Mendez RN  Medication  Review/Management: medications reviewed  Taken 3/18/2025 0200 by Philippe Mendez RN  Medication Review/Management: medications reviewed  Taken 3/18/2025 0000 by Philippe Mendez RN  Medication Review/Management: medications reviewed  Intervention: Promote Injury-Free Environment  Recent Flowsheet Documentation  Taken 3/18/2025 0400 by Philippe Mendez RN  Safety Promotion/Fall Prevention:   safety round/check completed   fall prevention program maintained   clutter free environment maintained  Taken 3/18/2025 0200 by Philippe Mendez RN  Safety Promotion/Fall Prevention:   safety round/check completed   fall prevention program maintained   clutter free environment maintained  Taken 3/18/2025 0000 by Philippe Mendez RN  Safety Promotion/Fall Prevention:   safety round/check completed   fall prevention program maintained   clutter free environment maintained     Problem: Skin Injury Risk Increased  Goal: Skin Health and Integrity  Outcome: Progressing  Intervention: Optimize Skin Protection  Recent Flowsheet Documentation  Taken 3/18/2025 0400 by Philippe Mendez RN  Activity Management: activity minimized  Head of Bed (HOB) Positioning: HOB at 30 degrees  Taken 3/18/2025 0200 by Philippe Mendez RN  Activity Management: activity minimized  Taken 3/18/2025 0000 by Philippe Mendez RN  Activity Management: activity minimized     Problem: Dysrhythmia  Goal: Normalized Cardiac Rhythm  Outcome: Progressing     Problem: Pain Acute  Goal: Optimal Pain Control and Function  Outcome: Progressing  Intervention: Prevent or Manage Pain  Recent Flowsheet Documentation  Taken 3/18/2025 0400 by Philippe Mendez RN  Medication Review/Management: medications reviewed  Taken 3/18/2025 0200 by Philippe Mendez RN  Medication Review/Management: medications reviewed  Taken 3/18/2025 0000 by Philippe Mendez RN  Medication Review/Management: medications reviewed     Problem: Hemodialysis  Goal: Safe, Effective Therapy  Delivery  Outcome: Progressing  Intervention: Optimize Device Care and Function  Recent Flowsheet Documentation  Taken 3/18/2025 0400 by Philippe Mendez RN  Medication Review/Management: medications reviewed  Taken 3/18/2025 0200 by Philippe Mendez RN  Medication Review/Management: medications reviewed  Taken 3/18/2025 0000 by Philippe Mendez RN  Medication Review/Management: medications reviewed  Goal: Effective Tissue Perfusion  Outcome: Progressing  Goal: Absence of Infection Signs and Symptoms  Outcome: Progressing  Intervention: Prevent or Manage Infection  Recent Flowsheet Documentation  Taken 3/18/2025 0400 by Philippe Mendez RN  Infection Prevention:   single patient room provided   rest/sleep promoted   hand hygiene promoted  Taken 3/18/2025 0200 by Philippe Mendez RN  Infection Prevention:   single patient room provided   rest/sleep promoted   hand hygiene promoted  Taken 3/18/2025 0000 by Philippe Mendez RN  Infection Prevention:   single patient room provided   rest/sleep promoted   hand hygiene promoted     Problem: Malnutrition  Goal: Improved Nutritional Intake  Outcome: Progressing   Goal Outcome Evaluation:

## 2025-03-18 NOTE — PROGRESS NOTES
Name: Carlito Mo ADMIT: 3/10/2025   : 1955  PCP: Belle Simons APRN    MRN: [id number] LOS: 1 days   AGE/SEX: 69 y.o. male  ROOM: [facility]     Subjective   Subjective   Patient seen at bedside.       Objective   Objective   Vital Signs  Temp:  [97.4 °F (36.3 °C)-98 °F (36.7 °C)] 98 °F (36.7 °C)  Heart Rate:  [67-81] 77  Resp:  [16-18] 18  BP: (134-166)/(60-79) 148/68  SpO2:  [96 %-100 %] 99 %  on   ;   Device (Oxygen Therapy): room air  Body mass index is 20.12 kg/m².  Physical Exam  General, awake and alert.  Appears sick on chronic basis  Head and ENT, normocephalic and atraumatic.  Lungs, symmetric expansion, equal air entry bilaterally.  Heart, regular rate and rhythm.  Abdomen, soft and nontender.  Extremities, no clubbing or cyanosis.  Neuro, no focal deficits.  Skin: Warm and no rash.  Psych, normal mood and affect.  Musculoskeletal, joint examination is grossly normal.    Copied text material from yesterday's note has been reviewed for appropriate changes and remains accurate as of 3/18/25.      Results Review     I reviewed the patient's new clinical results.  Results from last 7 days   Lab Units 25  0733 25  0427 25  1151 25  0301   WBC 10*3/mm3 4.89 4.65 10.70 11.94*   HEMOGLOBIN g/dL 8.5* 8.7* 9.4* 11.0*   PLATELETS 10*3/mm3 218 216 233 260     Results from last 7 days   Lab Units 25  0733 25  0427 25  1138 25  0301   SODIUM mmol/L 133* 136 138 139   POTASSIUM mmol/L 4.2 4.0 4.1  4.1 4.1   CHLORIDE mmol/L 97* 100 101 99   CO2 mmol/L 19.8* 20.0* 22.6 22.0   BUN mg/dL 69* 58* 51* 43*   CREATININE mg/dL 5.18* 4.34* 3.51* 2.96*   GLUCOSE mg/dL 98 141* 140* 219*   EGFR mL/min/1.73 11.3* 14.0* 18.1* 22.2*     Results from last 7 days   Lab Units 25  0733 25  1138 25  0518   ALBUMIN g/dL 2.9* 3.3* 3.3*   BILIRUBIN mg/dL  --  0.3  --    ALK PHOS U/L  --  104  --    AST (SGOT) U/L  --  18  --    ALT (SGPT) U/L  --  17  --       Results from last 7 days   Lab Units 03/18/25  0733 03/17/25  0427 03/16/25  1138 03/16/25  0301 03/15/25  0409 03/14/25  0414 03/13/25  0501 03/12/25  0518   CALCIUM mg/dL 7.7* 8.2* 8.4* 9.0   < > 8.2* 8.5* 8.5*   ALBUMIN g/dL 2.9*  --  3.3*  --   --   --   --  3.3*   MAGNESIUM mg/dL  --   --  1.8  --   --  1.9  --   --    PHOSPHORUS mg/dL 4.1  --   --   --   --  3.2 3.6 3.2    < > = values in this interval not displayed.     Results from last 7 days   Lab Units 03/16/25  1151 03/16/25  1138 03/16/25  0420 03/16/25  0301   PROCALCITONIN ng/mL  --  0.81*  --  0.29*   LACTATE mmol/L 1.5  --  1.9  --      Glucose   Date/Time Value Ref Range Status   03/18/2025 1725 125 70 - 130 mg/dL Final   03/18/2025 1134 136 (H) 70 - 130 mg/dL Final   03/18/2025 0549 114 70 - 130 mg/dL Final   03/17/2025 2108 180 (H) 70 - 130 mg/dL Final   03/17/2025 1605 133 (H) 70 - 130 mg/dL Final   03/17/2025 1118 188 (H) 70 - 130 mg/dL Final   03/17/2025 0659 140 (H) 70 - 130 mg/dL Final       No radiology results for the last day    I have personally reviewed all medications:  Scheduled Medications  amiodarone, 200 mg, Oral, Q24H  amoxicillin, 500 mg, Oral, Q12H  apixaban, 2.5 mg, Oral, BID  aspirin, 325 mg, Oral, Daily  atorvastatin, 80 mg, Oral, Nightly  insulin lispro, 2-7 Units, Subcutaneous, 4x Daily AC & at Bedtime  levothyroxine, 75 mcg, Oral, Q AM  midodrine, 5 mg, Oral, TID AC  mupirocin, 1 Application, Each Nare, BID  oxymetazoline, 2 spray, Each Nare, BID  pantoprazole, 40 mg, Oral, BID AC  sodium chloride, 10 mL, Intravenous, Q12H  tamsulosin, 0.4 mg, Oral, Daily    Infusions  phenylephrine, 0.5-3 mcg/kg/min, Last Rate: Stopped (03/17/25 0545)    Diet  Diet: Cardiac, Diabetic, Renal; Low Sodium (2g); Consistent Carbohydrate; Low Sodium (2-3g), Low Potassium; Fluid Consistency: Thin (IDDSI 0)    I have personally reviewed:  [x]  Laboratory   [x]  Microbiology   [x]  Radiology   [x]  EKG/Telemetry  [x]  Cardiology/Vascular   []   Pathology    []  Records       Assessment/Plan     Active Hospital Problems    Diagnosis  POA    **Generalized weakness [R53.1]  Yes    Ileus [K56.7]  Unknown    Chronic HFrEF (heart failure with reduced ejection fraction) [I50.22]  Yes    ESRD (end stage renal disease) [N18.6]  Yes    History of stroke [Z86.73]  Not Applicable    Paroxysmal atrial fibrillation [I48.0]  Yes    Embolic stroke [I63.9]  Yes    Essential hypertension [I10]  Yes    Coronary artery disease involving native coronary artery of native heart without angina pectoris [I25.10]  Yes    Acquired hypothyroidism [E03.9]  Yes    Type 2 diabetes mellitus, with long-term current use of insulin [E11.9, Z79.4]  Not Applicable      Resolved Hospital Problems   No resolved problems to display.       69 y.o. male admitted with Generalized weakness.      Assessment and plan  1.  Hypotension, related to A-fib, now resolved.  Patient remains on midodrine.  Patient followed by cardiology and nephrology service, follow management recommendations.  Continue amiodarone.     2.  End-stage renal disease, dependent on hemodialysis, continue dialysis based on nephrology recommendations.     3.  Pneumonia, seen on CT scan, continue antibiotics.  Can transition to oral antibiotics soon.     4.  Diabetes mellitus, continue Accu-Cheks and sliding scale insulin coverage.     5.  CODE STATUS is full code.  Further plans based on hospital course.            Expected Discharge Date: 3/19/2025; Expected Discharge Time:       Jacques Alonzo MD  [facility]  03/18/25  19:15 EDT

## 2025-03-18 NOTE — PROGRESS NOTES
Enter Query Response Below      Query Response:   Bacterial pneumonia     Electronically signed by Jacques Alonzo MD, 03/18/25, 7:14 PM EDT.               If applicable, please update the problem list.

## 2025-03-18 NOTE — PROGRESS NOTES
[facility]  [phone]  Dr. Jhonatan Carolina    Subjective:  LOS: 1    Chief Complaint: Hypotension and A-fib    Complains of his nose being stopped up.  Denies cough or phlegm.    Objective   Vital Signs past 24hrs  Temp range: Temp (24hrs), Av.6 °F (36.4 °C), Min:97.4 °F (36.3 °C), Max:97.9 °F (36.6 °C)    BP range: BP: (108-166)/() 143/61  Pulse range: Heart Rate:  [66-81] 80  Resp rate range: Resp:  [16-19] 18  Device (Oxygen Therapy): room air   Oxygen range:SpO2:  [96 %-100 %] 99 %   Mechanical Ventilator:     Physical Exam  Eyes:      Pupils: Pupils are equal, round, and reactive to light.   Cardiovascular:      Rate and Rhythm: Normal rate and regular rhythm.      Heart sounds: No murmur heard.  Pulmonary:      Effort: Pulmonary effort is normal.      Breath sounds: Normal breath sounds.   Abdominal:      General: Bowel sounds are normal.      Palpations: Abdomen is soft. There is no mass.      Tenderness: There is no abdominal tenderness.   Musculoskeletal:         General: No swelling.   Neurological:      Mental Status: He is alert.       Results Review:    I have reviewed the laboratory and imaging data since the last note by [facility] physician.  My annotations are noted in assessment and plan.      Result Review:  I have personally reviewed the results from last note by [facility] physician to 3/18/2025 13:22 EDT and agree with these findings:  [x]  Laboratory list / accordion  [x]  Microbiology  [x]  Radiology  []  EKG/Telemetry   []  Cardiology/Vascular   []  Pathology  []  Old records  []  Other:      Medication Review:  I have reviewed the current MAR.  My annotations are noted in assessment and plan.    amiodarone, 200 mg, Oral, Q24H  amoxicillin, 500 mg, Oral, Q8H  apixaban, 2.5 mg, Oral, BID  aspirin, 325 mg, Oral, Daily  atorvastatin, 80 mg, Oral, Nightly  insulin lispro, 2-7 Units, Subcutaneous, 4x Daily AC & at Bedtime  levothyroxine, 75 mcg, Oral, Q AM  midodrine, 5 mg, Oral, TID  AC  mupirocin, 1 Application, Each Nare, BID  oxymetazoline, 2 spray, Each Nare, BID  pantoprazole, 40 mg, Oral, BID AC  sodium chloride, 10 mL, Intravenous, Q12H  tamsulosin, 0.4 mg, Oral, Daily        phenylephrine, 0.5-3 mcg/kg/min, Last Rate: Stopped (03/17/25 0545)      Lines, Drains & Airways       Active LDAs       Name Placement date Placement time Site Days    Peripheral IV 03/10/25 Anterior;Left;Proximal Forearm 03/10/25  --  Forearm  7    Peripheral IV 03/16/25 1106 Left Wrist 03/16/25  1106  Wrist  less than 1                    PCCM Problems  Hypotension presumably cardiogenic  Chronic hypotension on midodrine  A-fib with RVR  ESRD on HD  Type 2 diabetes mellitus  History CVA  Chronic anticoagulation for A-fib  Cardiomyopathy with EF 45%  CAD  Nausea and vomiting  Abdominal pain  RML and RLL infiltrate  Thickening of rectum and sigmoid walls      Plan of Treatment    Hypotension resolved and on midodrine.    A-fib with RVR.  Now sinus.  Will be on amiodarone.    Will undergo dialysis again today.    Right middle lobe and right lower lobe infiltrate seen on the CT chest.  Unclear if new infection or simply sterile aspiration.  Will treat with antibiotics for 5 days.  On oral antibiotics and can be discharged on same.    No issues with acute abdomen.    Pulmonary will sign off.  Please call back if needed    Jhonatan Carolina MD  03/18/25  13:22 EDT    Isolation status: No active isolations    Dietary Orders (From admission, onward)       Start     Ordered    03/17/25 1200  Dietary Nutrition Supplements Boost Glucose Control (Glucerna Shake); chocolate  Daily With Breakfast, Lunch & Dinner      Comments: Each shake contains 170 mg potassium   Question Answer Comment   Select Supplement: Boost Glucose Control (Glucerna Shake)    Flavor: chocolate        03/17/25 1141    03/16/25 1803  Diet: Cardiac, Diabetic, Renal; Low Sodium (2g); Consistent Carbohydrate; Low Sodium (2-3g), Low Potassium; Fluid Consistency:  Thin (IDDSI 0)  Diet Effective Now        References:    Diet Order Definitions   Question Answer Comment   Diets: Cardiac    Diets: Diabetic    Diets: Renal    Cardiac Diet: Low Sodium (2g)    Diabetic Diet: Consistent Carbohydrate    Renal Diet: Low Sodium (2-3g)    Renal Diet: Low Potassium    Fluid Consistency: Thin (IDDSI 0)        03/16/25 9095                    Part of this note may be an electronic transcription/translation of spoken language to printed text using the Dragon Dictation System.

## 2025-03-18 NOTE — CASE MANAGEMENT/SOCIAL WORK
Continued Stay Note  [facility]     Patient Name: Carlito Mo  MRN: [id number]  Today's Date: 3/18/2025    Admit Date: 3/10/2025    Plan: [facility] with expedited appeal in process   Discharge Plan       Row Name 03/18/25 1102       Plan    Plan [facility] with expedited appeal in process    Plan Comments Received call from [employer] in [facility] authorizations that patient was insurance denial and unable to cancel and restart the precert. Per Keny patient is eligible for expedited appeal. [facility]am[facility] patients signed request for expedited appeal to Ken[employer] who will notify insurance of expedited appeal request. Patient informed of process and verbalizes undersatnding. MD and bedside nurse notfied via epic chat.                      Expected Discharge Date and Time       Expected Discharge Date Expected Discharge Time    Mar 19, 2025               Whit Almanzar RN

## 2025-03-18 NOTE — PROGRESS NOTES
[facility] Progress Note      Patient Name: Carlito Mo  : 1955  MRN: [id number]  Primary Care Physician:  Belle Simons APRN  Date of admission: 3/10/2025    Subjective     Interval History:   F/U ESRD  The patient is feeling the same, denies any chest pain or shortness of air, no orthopnea or PND, no nausea or vomiting he is complaining of stuffy nose.  Review of Systems:   As noted above    Objective     Vitals:   Temp:  [97.4 °F (36.3 °C)-97.6 °F (36.4 °C)] 97.6 °F (36.4 °C)  Heart Rate:  [66-81] 80  Resp:  [16-19] 16  BP: (108-166)/() 140/65    Intake/Output Summary (Last 24 hours) at 3/18/2025 0921  Last data filed at 3/18/2025 0600  Gross per 24 hour   Intake 480 ml   Output 300 ml   Net 180 ml       Physical Exam:    General Appearance: Awake, alert, chronically ill and slow answering questions   Neck: No JVD  Lungs: Bilateral rhonchi, breathing effort not labored  Heart: Irregularly irregular, no  Abdomen: soft, nontender, nondistended  Extremities: no edema, cyanosis or clubbing ,functional AV fistula in the right forearm with good thrill and bruit      Scheduled Meds:     amiodarone, 200 mg, Oral, Q24H  amoxicillin, 500 mg, Oral, Q8H  apixaban, 2.5 mg, Oral, BID  aspirin, 325 mg, Oral, Daily  atorvastatin, 80 mg, Oral, Nightly  insulin lispro, 2-7 Units, Subcutaneous, 4x Daily AC & at Bedtime  levothyroxine, 75 mcg, Oral, Q AM  midodrine, 10 mg, Oral, TID AC  mupirocin, 1 Application, Each Nare, BID  pantoprazole, 40 mg, Oral, BID AC  sodium chloride, 10 mL, Intravenous, Q12H  tamsulosin, 0.4 mg, Oral, Daily      IV Meds:   phenylephrine, 0.5-3 mcg/kg/min, Last Rate: Stopped (25 0545)        Results Reviewed:   I have personally reviewed the results from the time of this admission to 3/18/2025 09:21 EDT     Results from last 7 days   Lab Units 25  0733 25  0427 25  1138   SODIUM mmol/L 133* 136 138   POTASSIUM mmol/L 4.2 4.0 4.1  4.1    CHLORIDE mmol/L 97* 100 101   CO2 mmol/L 19.8* 20.0* 22.6   BUN mg/dL 69* 58* 51*   CREATININE mg/dL 5.18* 4.34* 3.51*   CALCIUM mg/dL 7.7* 8.2* 8.4*   BILIRUBIN mg/dL  --   --  0.3   ALK PHOS U/L  --   --  104   ALT (SGPT) U/L  --   --  17   AST (SGOT) U/L  --   --  18   GLUCOSE mg/dL 98 141* 140*     Estimated Creatinine Clearance: 12.8 mL/min (A) (by C-G formula based on SCr of 5.18 mg/dL (H)).  Results from last 7 days   Lab Units 03/18/25  0733 03/16/25  1138 03/14/25  0414 03/13/25  0501   MAGNESIUM mg/dL  --  1.8 1.9  --    PHOSPHORUS mg/dL 4.1  --  3.2 3.6         Results from last 7 days   Lab Units 03/18/25  0733 03/17/25  0427 03/16/25  1151 03/16/25  0301 03/15/25  0409   WBC 10*3/mm3 4.89 4.65 10.70 11.94* 4.76   HEMOGLOBIN g/dL 8.5* 8.7* 9.4* 11.0* 8.5*   PLATELETS 10*3/mm3 218 216 233 260 213           Assessment / Plan     ASSESSMENT:  ESRD - HD MWF via RUE AVF - TTS this week (dialyzed Saturday) as will be his schedule at rehab ([facility]).  Plan for dialysis today, sodium today 133 and volume status electrolyte within acceptable range.  Hypotension -related to A-fib with RVR, improved significant  S/P fall with generalized weakness - plan is rehab   DM2, mgmt per primary team  AF/RVR -followed by cardio  BPH, on flomax   Anemia of CKD, lab error re: initial hgb (8.5 -> 11); hemoglobin today is 8.5, stable      PLAN:  Continue the same treatment  Continue midodrine  Hemodialysis today  Probable transfer to rehab and he will have dialysis at the rehab  Surveillance labs    I reviewed the chart and other providers notes, reviewed labs.  I discussed the case with the patient and his nurse at the bedside  Copied text in this note has been reviewed and is accurate as of 03/18/25.         Cameron Olguin MD  03/18/25  09:21 EDT    [facility]  [phone]

## 2025-03-18 NOTE — PROGRESS NOTES
"    Patient Name: Carlito Mo  :1955  69 y.o.      Patient Care Team:  Belle Simons APRN as PCP - General (Family Medicine)  Amber Murguia MD as Consulting Physician (Cardiology)  Sera La BRYAN as Pharmacist  Seth Ladd MD as Surgeon (General Surgery)  Kim Hoyos MD PhD as Consulting Physician (Hematology and Oncology)  Jerome Diallo MD as Referring Physician (Family Medicine)  Jose Enrique Louie MD as Consulting Physician (Gastroenterology)  Nima Huddleston MD as Consulting Physician (Nephrology)    Chief Complaint: follow up atrial fibrillation/flutter     Interval History: in SR. He is resting in bed eating lunch. No specific complaints.        Objective   Vital Signs  Temp:  [97.4 °F (36.3 °C)-97.9 °F (36.6 °C)] 97.9 °F (36.6 °C)  Heart Rate:  [66-81] 80  Resp:  [16-19] 18  BP: (108-166)/() 143/61    Intake/Output Summary (Last 24 hours) at 3/18/2025 1307  Last data filed at 3/18/2025 0600  Gross per 24 hour   Intake 480 ml   Output 300 ml   Net 180 ml     Flowsheet Rows      Flowsheet Row First Filed Value   Admission Height 182.9 cm (72.01\") Documented at 03/10/2025 1718   Admission Weight 66.2 kg (146 lb) Documented at 03/10/2025 1718            Physical Exam:   General Appearance:    Alert, cooperative, in no acute distress   Lungs:     Clear to auscultation.  Normal respiratory effort and rate.      Heart:    Regular rhythm and normal rate, normal S1 and S2, no murmurs, gallops or rubs.     Chest Wall:    No abnormalities observed   Abdomen:     Soft, nontender, positive bowel sounds.     Extremities:   no cyanosis, clubbing or edema.  No marked joint deformities.  Adequate musculoskeletal strength.       Results Review:    Results from last 7 days   Lab Units 25  0733   SODIUM mmol/L 133*   POTASSIUM mmol/L 4.2   CHLORIDE mmol/L 97*   CO2 mmol/L 19.8*   BUN mg/dL 69*   CREATININE mg/dL 5.18*   GLUCOSE mg/dL 98   CALCIUM mg/dL 7.7*         Results " from last 7 days   Lab Units 03/18/25  0733   WBC 10*3/mm3 4.89   HEMOGLOBIN g/dL 8.5*   HEMATOCRIT % 27.5*   PLATELETS 10*3/mm3 218         Results from last 7 days   Lab Units 03/16/25  1138   MAGNESIUM mg/dL 1.8     Results from last 7 days   Lab Units 03/16/25  1138   CHOLESTEROL mg/dL 114   TRIGLYCERIDES mg/dL 55   HDL CHOL mg/dL 48   LDL CHOL mg/dL 53               Medication Review:   amiodarone, 200 mg, Oral, Q24H  amoxicillin, 500 mg, Oral, Q8H  apixaban, 2.5 mg, Oral, BID  aspirin, 325 mg, Oral, Daily  atorvastatin, 80 mg, Oral, Nightly  insulin lispro, 2-7 Units, Subcutaneous, 4x Daily AC & at Bedtime  levothyroxine, 75 mcg, Oral, Q AM  midodrine, 10 mg, Oral, TID AC  mupirocin, 1 Application, Each Nare, BID  pantoprazole, 40 mg, Oral, BID AC  sodium chloride, 10 mL, Intravenous, Q12H  tamsulosin, 0.4 mg, Oral, Daily         phenylephrine, 0.5-3 mcg/kg/min, Last Rate: Stopped (03/17/25 0545)        Assessment & Plan   End stage renal disease on hemodialysis  Mechanical fall  Paroxysmal atrial flutter with associated hypotension  Paroxysmal atrial fibrillation  History of orthostatic hypotension  Chronic heart failure with reduced LVEF   Cardiomyopathy with varying ejection fractions (EF 35-40% 3/16/2025)  History of embolic stroke  Coronary artery disease status post CABG 2001. On aspirin and atorvastatin.   RBBB  COPD without acute exacerbation  Anemia of chronic disease, no source of bleeding by EGD 12/4/2023  Diabetes mellitus type II   Chronically elevated troponin due to poor renal clearance.   Hypotension , on midodrine . He is off pressors. This chronic condition was worsened by rapid atrial flutter. He will not tolerate tachy arrhythmias well.     Continue amiodarone and apixaban. He is in SR.   Last 2 doses of midodrine held due to elevated BP. Will reduced to 5 mg and follow.     JI Mcmullen  [facility]  03/18/25  13:07 EDT

## 2025-03-18 NOTE — SIGNIFICANT NOTE
03/18/25 1553   OTHER   Discipline physical therapist   Rehab Time/Intention   Session Not Performed patient unavailable for treatment  (pt currently on dialysis, PT will follow up tomorrow)   Recommendation   PT - Next Appointment 03/19/25

## 2025-03-18 NOTE — CASE MANAGEMENT/SOCIAL WORK
Post-Acute Authorization Submission      Post Acute Pre-Cert Documentation  Request Submitted by Facility - Type:: Hospital  Post-Acute Authorization Type Submitted:: SNF  Date Post Acute Pre-Cert Inititated per Facility: 03/14/25  Date Post Acute Pre-Cert Completed: 03/17/25  Accepting Facility: [facility] Discharge Date Requested: 03/14/25  All Clinicals Submitted?: Yes  Had Accepting Facility at Time of Submission: Yes  Response Received from Insurance?: Denial  Action Taken by Requesting Facility:: Family Appeal  Response Communicated to::   Authorization Number:: EXPEDITED APPEAL REQUESTED/SNF DENIAL# [id number]  Post Acute Pre-Cert Initiated Comment: EXPEDITED APPEAL REQUESTED TO FAX# [phone] W/APPEAL LETTER & CLINICALS ATTACHED. WILL FOLLOW UP FOR APPEAL CASE# AT [facility] FAST APPEALS # [phone].              Keny Milner, PCT

## 2025-03-19 LAB
ALBUMIN SERPL-MCNC: 3.4 G/DL (ref 3.5–5.2)
ANION GAP SERPL CALCULATED.3IONS-SCNC: 9 MMOL/L (ref 5–15)
BASOPHILS # BLD AUTO: 0.01 10*3/MM3 (ref 0–0.2)
BASOPHILS NFR BLD AUTO: 0.2 % (ref 0–1.5)
BUN SERPL-MCNC: 47 MG/DL (ref 8–23)
BUN/CREAT SERPL: 13.2 (ref 7–25)
CALCIUM SPEC-SCNC: 8.1 MG/DL (ref 8.6–10.5)
CHLORIDE SERPL-SCNC: 99 MMOL/L (ref 98–107)
CO2 SERPL-SCNC: 24 MMOL/L (ref 22–29)
CREAT SERPL-MCNC: 3.57 MG/DL (ref 0.76–1.27)
DEPRECATED RDW RBC AUTO: 46.2 FL (ref 37–54)
EGFRCR SERPLBLD CKD-EPI 2021: 17.7 ML/MIN/1.73
EOSINOPHIL # BLD AUTO: 0.12 10*3/MM3 (ref 0–0.4)
EOSINOPHIL NFR BLD AUTO: 2.9 % (ref 0.3–6.2)
ERYTHROCYTE [DISTWIDTH] IN BLOOD BY AUTOMATED COUNT: 14.5 % (ref 12.3–15.4)
GLUCOSE BLDC GLUCOMTR-MCNC: 146 MG/DL (ref 70–130)
GLUCOSE BLDC GLUCOMTR-MCNC: 154 MG/DL (ref 70–130)
GLUCOSE BLDC GLUCOMTR-MCNC: 160 MG/DL (ref 70–130)
GLUCOSE BLDC GLUCOMTR-MCNC: 182 MG/DL (ref 70–130)
GLUCOSE SERPL-MCNC: 130 MG/DL (ref 65–99)
HCT VFR BLD AUTO: 27.9 % (ref 37.5–51)
HGB BLD-MCNC: 9 G/DL (ref 13–17.7)
IMM GRANULOCYTES # BLD AUTO: 0.01 10*3/MM3 (ref 0–0.05)
IMM GRANULOCYTES NFR BLD AUTO: 0.2 % (ref 0–0.5)
LYMPHOCYTES # BLD AUTO: 0.94 10*3/MM3 (ref 0.7–3.1)
LYMPHOCYTES NFR BLD AUTO: 22.9 % (ref 19.6–45.3)
MCH RBC QN AUTO: 27.8 PG (ref 26.6–33)
MCHC RBC AUTO-ENTMCNC: 32.3 G/DL (ref 31.5–35.7)
MCV RBC AUTO: 86.1 FL (ref 79–97)
MONOCYTES # BLD AUTO: 0.52 10*3/MM3 (ref 0.1–0.9)
MONOCYTES NFR BLD AUTO: 12.7 % (ref 5–12)
NEUTROPHILS NFR BLD AUTO: 2.51 10*3/MM3 (ref 1.7–7)
NEUTROPHILS NFR BLD AUTO: 61.1 % (ref 42.7–76)
NRBC BLD AUTO-RTO: 0 /100 WBC (ref 0–0.2)
PHOSPHATE SERPL-MCNC: 2.5 MG/DL (ref 2.5–4.5)
PLATELET # BLD AUTO: 251 10*3/MM3 (ref 140–450)
PMV BLD AUTO: 10.4 FL (ref 6–12)
POTASSIUM SERPL-SCNC: 3.8 MMOL/L (ref 3.5–5.2)
RBC # BLD AUTO: 3.24 10*6/MM3 (ref 4.14–5.8)
SODIUM SERPL-SCNC: 132 MMOL/L (ref 136–145)
WBC NRBC COR # BLD AUTO: 4.11 10*3/MM3 (ref 3.4–10.8)

## 2025-03-19 PROCEDURE — 63710000001 INSULIN LISPRO (HUMAN) PER 5 UNITS: Performed by: NURSE PRACTITIONER

## 2025-03-19 PROCEDURE — 97530 THERAPEUTIC ACTIVITIES: CPT

## 2025-03-19 PROCEDURE — 80069 RENAL FUNCTION PANEL: CPT | Performed by: INTERNAL MEDICINE

## 2025-03-19 PROCEDURE — 85025 COMPLETE CBC W/AUTO DIFF WBC: CPT | Performed by: NURSE PRACTITIONER

## 2025-03-19 PROCEDURE — 82948 REAGENT STRIP/BLOOD GLUCOSE: CPT

## 2025-03-19 PROCEDURE — 99232 SBSQ HOSP IP/OBS MODERATE 35: CPT | Performed by: NURSE PRACTITIONER

## 2025-03-19 RX ADMIN — MIDODRINE HYDROCHLORIDE 5 MG: 5 TABLET ORAL at 11:13

## 2025-03-19 RX ADMIN — ASPIRIN 325 MG: 325 TABLET, COATED ORAL at 08:48

## 2025-03-19 RX ADMIN — INSULIN LISPRO 2 UNITS: 100 INJECTION, SOLUTION INTRAVENOUS; SUBCUTANEOUS at 16:47

## 2025-03-19 RX ADMIN — APIXABAN 2.5 MG: 2.5 TABLET, FILM COATED ORAL at 20:59

## 2025-03-19 RX ADMIN — AMOXICILLIN 500 MG: 250 CAPSULE ORAL at 20:59

## 2025-03-19 RX ADMIN — INSULIN LISPRO 2 UNITS: 100 INJECTION, SOLUTION INTRAVENOUS; SUBCUTANEOUS at 20:59

## 2025-03-19 RX ADMIN — MIDODRINE HYDROCHLORIDE 5 MG: 5 TABLET ORAL at 08:48

## 2025-03-19 RX ADMIN — MUPIROCIN 1 APPLICATION: 20 OINTMENT TOPICAL at 08:48

## 2025-03-19 RX ADMIN — Medication 10 ML: at 08:49

## 2025-03-19 RX ADMIN — ATORVASTATIN CALCIUM 80 MG: 80 TABLET, FILM COATED ORAL at 20:59

## 2025-03-19 RX ADMIN — INSULIN LISPRO 2 UNITS: 100 INJECTION, SOLUTION INTRAVENOUS; SUBCUTANEOUS at 11:13

## 2025-03-19 RX ADMIN — LEVOTHYROXINE SODIUM 75 MCG: 0.07 TABLET ORAL at 08:48

## 2025-03-19 RX ADMIN — MUPIROCIN 1 APPLICATION: 20 OINTMENT TOPICAL at 20:59

## 2025-03-19 RX ADMIN — AMOXICILLIN 500 MG: 250 CAPSULE ORAL at 08:48

## 2025-03-19 RX ADMIN — AMIODARONE HYDROCHLORIDE 200 MG: 200 TABLET ORAL at 08:48

## 2025-03-19 RX ADMIN — Medication 10 ML: at 21:00

## 2025-03-19 RX ADMIN — APIXABAN 2.5 MG: 2.5 TABLET, FILM COATED ORAL at 08:48

## 2025-03-19 RX ADMIN — TAMSULOSIN HYDROCHLORIDE 0.4 MG: 0.4 CAPSULE ORAL at 08:48

## 2025-03-19 RX ADMIN — PANTOPRAZOLE SODIUM 40 MG: 40 TABLET, DELAYED RELEASE ORAL at 16:47

## 2025-03-19 RX ADMIN — OXYMETAZOLINE HYDROCHLORIDE 2 SPRAY: 0.5 SPRAY NASAL at 21:00

## 2025-03-19 RX ADMIN — OXYMETAZOLINE HYDROCHLORIDE 2 SPRAY: 0.5 SPRAY NASAL at 08:49

## 2025-03-19 RX ADMIN — PANTOPRAZOLE SODIUM 40 MG: 40 TABLET, DELAYED RELEASE ORAL at 08:48

## 2025-03-19 RX ADMIN — MIDODRINE HYDROCHLORIDE 5 MG: 5 TABLET ORAL at 16:47

## 2025-03-19 NOTE — PROGRESS NOTES
Name: Carlito Mo ADMIT: 3/10/2025   : 1955  PCP: Belle Simons APRN    MRN: [id number] LOS: 2 days   AGE/SEX: 69 y.o. male  ROOM: [facility]     Subjective   Subjective   Patient seen at bedside today, patient is lying in bed.       Objective   Objective   Vital Signs  Temp:  [96.8 °F (36 °C)-98.4 °F (36.9 °C)] 98.1 °F (36.7 °C)  Heart Rate:  [64-76] 76  Resp:  [16-20] 20  BP: (124-135)/(58-76) 134/76  SpO2:  [97 %-100 %] 99 %  on   ;   Device (Oxygen Therapy): room air  Body mass index is 20.12 kg/m².  Physical Exam  General, awake and alert.  Appears sick on chronic basis  Head and ENT, normocephalic and atraumatic.  Lungs, symmetric expansion, equal air entry bilaterally.  Heart, regular rate and rhythm.  Abdomen, soft and nontender.  Extremities, no clubbing or cyanosis.  Neuro, no focal deficits.  Skin: Warm and no rash.  Psych, normal mood and affect.  Musculoskeletal, joint examination is grossly normal.     Copied text material from yesterday's note has been reviewed for appropriate changes and remains accurate as of 3/19/25.      Results Review     I reviewed the patient's new clinical results.  Results from last 7 days   Lab Units 25  0814 25  0725  1151   WBC 10*3/mm3 4.11 4.89 4.65 10.70   HEMOGLOBIN g/dL 9.0* 8.5* 8.7* 9.4*   PLATELETS 10*3/mm3 251 218 216 233     Results from last 7 days   Lab Units 25  0814 25  0733 25  1138   SODIUM mmol/L 132* 133* 136 138   POTASSIUM mmol/L 3.8 4.2 4.0 4.1  4.1   CHLORIDE mmol/L 99 97* 100 101   CO2 mmol/L 24.0 19.8* 20.0* 22.6   BUN mg/dL 47* 69* 58* 51*   CREATININE mg/dL 3.57* 5.18* 4.34* 3.51*   GLUCOSE mg/dL 130* 98 141* 140*   EGFR mL/min/1.73 17.7* 11.3* 14.0* 18.1*     Results from last 7 days   Lab Units 25  0814 25  0733 25  1138   ALBUMIN g/dL 3.4* 2.9* 3.3*   BILIRUBIN mg/dL  --   --  0.3   ALK PHOS U/L  --   --  104   AST (SGOT) U/L  --   --  18   ALT  (SGPT) U/L  --   --  17     Results from last 7 days   Lab Units 03/19/25  0814 03/18/25  0733 03/17/25  0427 03/16/25  1138 03/15/25  0409 03/14/25  0414 03/13/25  0501   CALCIUM mg/dL 8.1* 7.7* 8.2* 8.4*   < > 8.2* 8.5*   ALBUMIN g/dL 3.4* 2.9*  --  3.3*  --   --   --    MAGNESIUM mg/dL  --   --   --  1.8  --  1.9  --    PHOSPHORUS mg/dL 2.5 4.1  --   --   --  3.2 3.6    < > = values in this interval not displayed.     Results from last 7 days   Lab Units 03/16/25  1151 03/16/25  1138 03/16/25  0420 03/16/25  0301   PROCALCITONIN ng/mL  --  0.81*  --  0.29*   LACTATE mmol/L 1.5  --  1.9  --      Glucose   Date/Time Value Ref Range Status   03/19/2025 1637 154 (H) 70 - 130 mg/dL Final   03/19/2025 1105 160 (H) 70 - 130 mg/dL Final   03/19/2025 0612 146 (H) 70 - 130 mg/dL Final   03/18/2025 2057 173 (H) 70 - 130 mg/dL Final   03/18/2025 1725 125 70 - 130 mg/dL Final   03/18/2025 1134 136 (H) 70 - 130 mg/dL Final   03/18/2025 0549 114 70 - 130 mg/dL Final       No radiology results for the last day    I have personally reviewed all medications:  Scheduled Medications  amiodarone, 200 mg, Oral, Q24H  amoxicillin, 500 mg, Oral, Q12H  apixaban, 2.5 mg, Oral, BID  aspirin, 325 mg, Oral, Daily  atorvastatin, 80 mg, Oral, Nightly  insulin lispro, 2-7 Units, Subcutaneous, 4x Daily AC & at Bedtime  levothyroxine, 75 mcg, Oral, Q AM  midodrine, 5 mg, Oral, TID AC  mupirocin, 1 Application, Each Nare, BID  oxymetazoline, 2 spray, Each Nare, BID  pantoprazole, 40 mg, Oral, BID AC  sodium chloride, 10 mL, Intravenous, Q12H  tamsulosin, 0.4 mg, Oral, Daily    Infusions  phenylephrine, 0.5-3 mcg/kg/min, Last Rate: Stopped (03/17/25 0545)    Diet  Diet: Cardiac, Diabetic, Renal; Low Sodium (2g); Consistent Carbohydrate; Low Sodium (2-3g), Low Potassium; Fluid Consistency: Thin (IDDSI 0)    I have personally reviewed:  [x]  Laboratory   [x]  Microbiology   [x]  Radiology   [x]  EKG/Telemetry  [x]  Cardiology/Vascular   []  Pathology     []  Records       Assessment/Plan     Active Hospital Problems    Diagnosis  POA    **Generalized weakness [R53.1]  Yes    Ileus [K56.7]  Unknown    Chronic HFrEF (heart failure with reduced ejection fraction) [I50.22]  Yes    ESRD (end stage renal disease) [N18.6]  Yes    History of stroke [Z86.73]  Not Applicable    Paroxysmal atrial fibrillation [I48.0]  Yes    Embolic stroke [I63.9]  Yes    Essential hypertension [I10]  Yes    Coronary artery disease involving native coronary artery of native heart without angina pectoris [I25.10]  Yes    Acquired hypothyroidism [E03.9]  Yes    Type 2 diabetes mellitus, with long-term current use of insulin [E11.9, Z79.4]  Not Applicable      Resolved Hospital Problems   No resolved problems to display.       69 y.o. male admitted with Generalized weakness.    Assessment and plan  1.  Hypotension, related to A-fib, now resolved.  Patient remains on midodrine.  Patient followed by cardiology and nephrology service, follow management recommendations.  Continue amiodarone.     2.  End-stage renal disease, dependent on hemodialysis, continue dialysis based on nephrology recommendations.     3.  Pneumonia, seen on CT scan, continue antibiotics.  Now on amoxicillin.     4.  Diabetes mellitus, continue Accu-Cheks and sliding scale insulin coverage.     5.  CODE STATUS is full code.  Further plans based on hospital course.     Expected Discharge Date: 3/22/2025; Expected Discharge Time:       Jacques Alonzo MD  [facility]  03/19/25  19:42 EDT

## 2025-03-19 NOTE — PROGRESS NOTES
"    Patient Name: Carlito Mo  :1955  69 y.o.      Patient Care Team:  Belle Simons APRN as PCP - General (Family Medicine)  Amber Murguia MD as Consulting Physician (Cardiology)  Sera La BRYAN as Pharmacist  Seth Ladd MD as Surgeon (General Surgery)  Kim Hoyos MD PhD as Consulting Physician (Hematology and Oncology)  Jerome Diallo MD as Referring Physician (Family Medicine)  Jose Enrique Louie MD as Consulting Physician (Gastroenterology)  Nima Huddleston MD as Consulting Physician (Nephrology)    Chief Complaint: follow up atrial fibrillation/flutter     Interval History: NAEON. He is in SR.        Objective   Vital Signs  Temp:  [97.5 °F (36.4 °C)-98.4 °F (36.9 °C)] 98.4 °F (36.9 °C)  Heart Rate:  [64-77] 64  Resp:  [16-18] 18  BP: (124-148)/(58-75) 133/58    Intake/Output Summary (Last 24 hours) at 3/19/2025 1415  Last data filed at 3/19/2025 0235  Gross per 24 hour   Intake 90 ml   Output 1700 ml   Net -1610 ml     Flowsheet Rows      Flowsheet Row First Filed Value   Admission Height 182.9 cm (72.01\") Documented at 03/10/2025 1718   Admission Weight 66.2 kg (146 lb) Documented at 03/10/2025 1718            Physical Exam:   General Appearance:    Alert, cooperative, in no acute distress   Lungs:     Clear to auscultation.  Normal respiratory effort and rate.      Heart:    Regular rhythm and normal rate, normal S1 and S2, no murmurs, gallops or rubs.     Chest Wall:    No abnormalities observed   Abdomen:     Soft, nontender, positive bowel sounds.     Extremities:   no cyanosis, clubbing or edema.  No marked joint deformities.  Adequate musculoskeletal strength.       Results Review:    Results from last 7 days   Lab Units 25  0814   SODIUM mmol/L 132*   POTASSIUM mmol/L 3.8   CHLORIDE mmol/L 99   CO2 mmol/L 24.0   BUN mg/dL 47*   CREATININE mg/dL 3.57*   GLUCOSE mg/dL 130*   CALCIUM mg/dL 8.1*         Results from last 7 days   Lab Units 25  0814 "   WBC 10*3/mm3 4.11   HEMOGLOBIN g/dL 9.0*   HEMATOCRIT % 27.9*   PLATELETS 10*3/mm3 251         Results from last 7 days   Lab Units 03/16/25  1138   MAGNESIUM mg/dL 1.8     Results from last 7 days   Lab Units 03/16/25  1138   CHOLESTEROL mg/dL 114   TRIGLYCERIDES mg/dL 55   HDL CHOL mg/dL 48   LDL CHOL mg/dL 53               Medication Review:   amiodarone, 200 mg, Oral, Q24H  amoxicillin, 500 mg, Oral, Q12H  apixaban, 2.5 mg, Oral, BID  aspirin, 325 mg, Oral, Daily  atorvastatin, 80 mg, Oral, Nightly  insulin lispro, 2-7 Units, Subcutaneous, 4x Daily AC & at Bedtime  levothyroxine, 75 mcg, Oral, Q AM  midodrine, 5 mg, Oral, TID AC  mupirocin, 1 Application, Each Nare, BID  oxymetazoline, 2 spray, Each Nare, BID  pantoprazole, 40 mg, Oral, BID AC  sodium chloride, 10 mL, Intravenous, Q12H  tamsulosin, 0.4 mg, Oral, Daily         phenylephrine, 0.5-3 mcg/kg/min, Last Rate: Stopped (03/17/25 0545)        Assessment & Plan   End stage renal disease on hemodialysis  Mechanical fall  Paroxysmal atrial flutter with associated hypotension  Paroxysmal atrial fibrillation  History of orthostatic hypotension  Chronic heart failure with reduced LVEF   Cardiomyopathy with varying ejection fractions (EF 35-40% 3/16/2025)  History of embolic stroke  Coronary artery disease status post CABG 2001. On aspirin and atorvastatin.   RBBB  COPD without acute exacerbation  Anemia of chronic disease, no source of bleeding by EGD 12/4/2023  Diabetes mellitus type II   Chronically elevated troponin due to poor renal clearance.   Hypotension , on midodrine . He is off pressors. This chronic condition was worsened by rapid atrial flutter. He will not tolerate tachy arrhythmias well.     Continue amiodarone and apixaban. He is in SR.     All three doses of midodrine held yesterday. Reduced to 5 mg TID and tolerating.     No objection to rehab. Will see as needed.     JI Mcmullen  [facility]  03/19/25  14:15  EDT

## 2025-03-19 NOTE — PLAN OF CARE
Goal Outcome Evaluation:              Outcome Evaluation: vss, a/o x4, sat in chair for alot of the day, voiding well, worked with PT, no complaints of pain, awaiting to hear from snf/insurance.

## 2025-03-19 NOTE — PLAN OF CARE
Goal Outcome Evaluation:  Plan of Care Reviewed With: patient        Progress: improving  Outcome Evaluation: vss, no complaints of pain. pt rested well during shift. pt turned self with encouragement. labs in am. pt is awaiting precert for SNF.

## 2025-03-19 NOTE — PLAN OF CARE
Goal Outcome Evaluation:  Plan of Care Reviewed With: (P) patient           Outcome Evaluation: (P) Pt was alert and oriented x3, very lethargic, pt showed good effort during todays session. Pt was able to sit EOB with CGA. Pt continues to sit with a thoracic kyphosis and is slow to initiate movement. Pt was able to ambulate to the doorway and back to the chair (approx. 20') with CGA. Pt continues to have a wide ADRIEL and forward flexed posture while ambulating. Pt will continue to benefit from skilled PT services to improve strength and endurance deficits. PT will continue to follow and progress as tolerated. Anticipate d/c to SNF.    Anticipated Discharge Disposition (PT): (P) skilled nursing facility

## 2025-03-19 NOTE — PROGRESS NOTES
[facility] Progress Note      Patient Name: Carlito Mo  : 1955  MRN: [id number]  Primary Care Physician:  Belle Simons APRN  Date of admission: 3/10/2025    Subjective     Interval History:   F/U ESRD  The patient is feeling the same, denies any chest pain or shortness of air, no orthopnea or PND, no nausea or vomiting he is complaining of stuffy nose.  The patient had dialysis yesterday without any difficulties.  Review of Systems:   As noted above    Objective     Vitals:   Temp:  [97.5 °F (36.4 °C)-98.4 °F (36.9 °C)] 98.4 °F (36.9 °C)  Heart Rate:  [64-80] 64  Resp:  [16-18] 18  BP: (124-148)/(58-75) 133/58    Intake/Output Summary (Last 24 hours) at 3/19/2025 0836  Last data filed at 3/19/2025 0235  Gross per 24 hour   Intake 90 ml   Output 1700 ml   Net -1610 ml       Physical Exam:    General Appearance: Awake, alert, chronically ill and slow answering questions   Neck: No JVD  Lungs: Bilateral rhonchi, breathing effort not labored  Heart: Irregularly irregular, no  Abdomen: soft, nontender, nondistended  Extremities: no edema, cyanosis or clubbing ,functional AV fistula in the right forearm with good thrill and bruit      Scheduled Meds:     amiodarone, 200 mg, Oral, Q24H  amoxicillin, 500 mg, Oral, Q12H  apixaban, 2.5 mg, Oral, BID  aspirin, 325 mg, Oral, Daily  atorvastatin, 80 mg, Oral, Nightly  insulin lispro, 2-7 Units, Subcutaneous, 4x Daily AC & at Bedtime  levothyroxine, 75 mcg, Oral, Q AM  midodrine, 5 mg, Oral, TID AC  mupirocin, 1 Application, Each Nare, BID  oxymetazoline, 2 spray, Each Nare, BID  pantoprazole, 40 mg, Oral, BID AC  sodium chloride, 10 mL, Intravenous, Q12H  tamsulosin, 0.4 mg, Oral, Daily      IV Meds:   phenylephrine, 0.5-3 mcg/kg/min, Last Rate: Stopped (25 0545)        Results Reviewed:   I have personally reviewed the results from the time of this admission to 3/19/2025 08:36 EDT     Results from last 7 days   Lab Units  03/18/25  0733 03/17/25  0427 03/16/25  1138   SODIUM mmol/L 133* 136 138   POTASSIUM mmol/L 4.2 4.0 4.1  4.1   CHLORIDE mmol/L 97* 100 101   CO2 mmol/L 19.8* 20.0* 22.6   BUN mg/dL 69* 58* 51*   CREATININE mg/dL 5.18* 4.34* 3.51*   CALCIUM mg/dL 7.7* 8.2* 8.4*   BILIRUBIN mg/dL  --   --  0.3   ALK PHOS U/L  --   --  104   ALT (SGPT) U/L  --   --  17   AST (SGOT) U/L  --   --  18   GLUCOSE mg/dL 98 141* 140*     Estimated Creatinine Clearance: 12.8 mL/min (A) (by C-G formula based on SCr of 5.18 mg/dL (H)).  Results from last 7 days   Lab Units 03/18/25  0733 03/16/25  1138 03/14/25  0414 03/13/25  0501   MAGNESIUM mg/dL  --  1.8 1.9  --    PHOSPHORUS mg/dL 4.1  --  3.2 3.6         Results from last 7 days   Lab Units 03/18/25  0733 03/17/25  0427 03/16/25  1151 03/16/25  0301 03/15/25  0409   WBC 10*3/mm3 4.89 4.65 10.70 11.94* 4.76   HEMOGLOBIN g/dL 8.5* 8.7* 9.4* 11.0* 8.5*   PLATELETS 10*3/mm3 218 216 233 260 213           Assessment / Plan     ASSESSMENT:  ESRD - HD MWF via RUE AVF - TTS this week (dialyzed Saturday) as will be his schedule at rehab ([facility]).  Had dialysis yesterday.  Without any difficulties .    Hypotension -related to A-fib with RVR, improved significant  S/P fall with generalized weakness - plan is rehab   DM2, mgmt per primary team  AF/RVR -followed by cardio  BPH, on flomax   Anemia of CKD, lab error re: initial hgb (8.5 -> 11); hemoglobin yesterday 8.5, stable      PLAN:  Continue the same treatment  Continue midodrine  Patient is cleared to be transferred to rehab at [facility] and he will have dialysis at the facility tomorrow      I reviewed the chart and other providers notes, reviewed labs.  I discussed the case with the patient  Copied text in this note has been reviewed and is accurate as of 03/19/25.         Cameron Olguin MD  03/19/25  08:36 EDT    [facility]  [phone]

## 2025-03-19 NOTE — THERAPY TREATMENT NOTE
Patient Name: Carlito Mo  : 1955    MRN: [id number]                              Today's Date: 3/19/2025       Admit Date: 3/10/2025    Visit Dx:     ICD-10-CM ICD-9-CM   1. Generalized weakness  R53.1 780.79   2. ESRD on dialysis  N18.6 585.6    Z99.2 V45.11   3. Fall, initial encounter  W19.XXXA E888.9     Patient Active Problem List   Diagnosis    Major depressive disorder with single episode, in partial remission    Other hyperlipidemia    Type 2 diabetes mellitus, with long-term current use of insulin    Vitamin D deficiency    Erectile dysfunction    Chronic fatigue    Type 2 diabetes mellitus with ophthalmic complication    Benign prostatic hyperplasia with nocturia    History of basal cell carcinoma    Acquired hypothyroidism    Recurrent strokes    Suspected sleep apnea    YAW (obstructive sleep apnea)    Coronary artery disease involving native coronary artery of native heart without angina pectoris    Chronically elevated troponin    Colon cancer screening    Enlarged lymph nodes    Functional gait abnormality    History of myocardial infarction    Insomnia    Iron deficiency anemia    Major depression, recurrent    Essential hypertension    Acute on chronic renal insufficiency    Falls frequently    Acute on chronic anemia    Neurogenic orthostatic hypotension    Anemia, chronic disease    History of colon polyps    Helicobacter pylori gastritis    Esophagitis    Embolic stroke    Patent foramen ovale    Cardiomyopathy    Diarrhea    Generalized weakness    Paroxysmal atrial fibrillation    Chronic anticoagulation    Acute ischemic stroke    History of stroke    Iron deficiency anemia    Acute HFrEF (heart failure with reduced ejection fraction)    ESRD (end stage renal disease)    Hemoptysis    Chronic HFrEF (heart failure with reduced ejection fraction)    Hemodialysis patient    Exertional dyspnea    Severe malnutrition    Ataxia    Cerebellar stroke, acute    Medically noncompliant     Vertebral artery stenosis, bilateral    Carotid stenosis, right    Ileus     Past Medical History:   Diagnosis Date    Acquired hypothyroidism     Acute sinusitis     SYLVAIN (acute kidney injury)     on CKD    Anemia     Arthritis     Atrial fibrillation     CAD (coronary artery disease)     Chronic combined systolic and diastolic congestive heart failure     COPD (chronic obstructive pulmonary disease)     Depression     Diabetic neuropathy 04/11/2022    Diabetes mellitus due to underlying condition    Esophagitis 06/10/2020    Added automatically from request for surgery [id number]      ESRD (end stage renal disease) 12/01/2023    Frequent PVCs     Generalized weakness     GERD (gastroesophageal reflux disease)     Helicobacter pylori gastritis 06/04/2020    Hyperlipidemia     Hypertension     Hypertensive encephalopathy     Pleural effusion     Pneumonia     Poor historian     RBBB (right bundle branch block)     Stroke     POOR VISION    TIA (transient ischemic attack)     Type 2 diabetes mellitus     Urinary retention     Vitamin D deficiency      Past Surgical History:   Procedure Laterality Date    APPENDECTOMY N/A 02/14/2016    Dr. Alexey Dodson    ARTERIOVENOUS FISTULA/SHUNT SURGERY Right 06/03/2022    Procedure: RIGHT FOREARM ARTERIOVENOUS FISTULA PLACEMENT RADIOCEPHALIC WITH CEPHALIC VEIN TRANSPOSITION;  Surgeon: Eliseo Willis MD;  Location: [facility] MAIN OR;  Service: Vascular;  Laterality: Right;    COLONOSCOPY      over 20 years ago.  no polyps     COLONOSCOPY N/A 09/18/2018    Procedure: COLONOSCOPY;  Surgeon: Jose Enrique Louie MD;  Location: [facility] ENDOSCOPY;  Service: Gastroenterology    COLONOSCOPY N/A 09/19/2018    IH, EH, polyps (TAs w/low grade dysplasia)    COLONOSCOPY N/A 06/01/2020    Procedure: COLONOSCOPY;  Surgeon: Jose Enrique Louie MD;  Location: [facility] ENDOSCOPY;  Service: Gastroenterology;  Laterality: N/A;  Pre op-Anemia, Melena, History of Polyps  Post op-To Cecum/TI, Polyp,  Poor Prep    CORONARY ARTERY BYPASS GRAFT  11/2001    ENDOSCOPY N/A 05/29/2020    Procedure: ESOPHAGOGASTRODUODENOSCOPY with biopsies;  Surgeon: Amanda Lovelace MD;  Location: [facility] ENDOSCOPY;  Service: Gastroenterology;  Laterality: N/A;  pre-anemia, dark stools  post-esophagitis, hiatal hernia    ENDOSCOPY N/A 09/15/2020    Procedure: ESOPHAGOGASTRODUODENOSCOPY WITH BIOPSIES;  Surgeon: Jose Enrique Louie MD;  Location: [facility] ENDOSCOPY;  Service: Gastroenterology;  Laterality: N/A;  pre: history of melena and esophagitis  post: mild esophagitis and gastritis, small hiatal hernia    ENDOSCOPY N/A 12/4/2023    Procedure: ESOPHAGOGASTRODUODENOSCOPY with bx;  Surgeon: Quoc Elmore MD;  Location: [facility] ENDOSCOPY;  Service: Gastroenterology;  Laterality: N/A;  pre: Coffee-ground emesis  post: hiatal hernia, esophagitis    HERNIA REPAIR Left 12/05/2019    THORACENTESIS      TONSILLECTOMY      VASECTOMY        General Information       Row Name 03/19/25 0951          Physical Therapy Time and Intention    Document Type therapy note (daily note) (P)   -CB     Mode of Treatment physical therapy (P)   -CB       Row Name 03/19/25 0951          General Information    Existing Precautions/Restrictions fall (P)   -CB       Row Name 03/19/25 0951          Cognition    Orientation Status (Cognition) oriented x 3 (P)   -CB       Row Name 03/19/25 0951          Safety Issues/Impairments Affecting Functional Mobility    Impairments Affecting Function (Mobility) balance;strength;endurance/activity tolerance;postural/trunk control (P)   -CB               User Key  (r) = Recorded By, (t) = Taken By, (c) = Cosigned By      Initials Name Provider Type    CB Charlene Staley, PT Student PT Student                   Mobility       Row Name 03/19/25 0952          Bed Mobility    Bed Mobility supine-sit (P)   -CB     Supine-Sit [place] (Bed Mobility) contact guard (P)   -CB     Assistive Device (Bed Mobility) head of bed  "elevated;bed rails (P)   -CB       Row Name 03/19/25 0952          Sit-Stand Transfer    Sit-Stand [place] (Transfers) contact guard (P)   -CB     Assistive Device (Sit-Stand Transfers) walker, front-wheeled (P)   -CB       Row Name 03/19/25 0952          Gait/Stairs (Locomotion)    [place] Level (Gait) contact guard (P)   -CB     Assistive Device (Gait) walker, front-wheeled (P)   -CB     Distance in Feet (Gait) 20 (P)   -CB     Deviations/Abnormal Patterns (Gait) weight shifting decreased;base of support, wide;eli decreased;gait speed decreased (P)   -CB     Bilateral Gait Deviations forward flexed posture;heel strike decreased (P)   -CB     Comment, (Gait/Stairs) Ambulated to doorway and back to chair, pt has thoracic kyphosis and wide ADRIEL, slow at initiating movement. (P)   -CB               User Key  (r) = Recorded By, (t) = Taken By, (c) = Cosigned By      Initials Name Provider Type    Charlene Mora, PT Student PT Student                   Obj/Interventions    No documentation.                  Goals/Plan    No documentation.                  Clinical Impression       Row Name 03/19/25 0956          Pain    Pretreatment Pain Rating 0/10 - no pain (P)   -CB     Posttreatment Pain Rating 0/10 - no pain (P)   -CB     Pre/Posttreatment Pain Comment continues to reply with \"my nose is stuffy\" when asked about pain. (P)   -CB       Row Name 03/19/25 0956          Plan of Care Review    Plan of Care Reviewed With patient (P)   -CB     Outcome Evaluation Pt was alert and oriented x3, very lethargic, pt showed good effort during todays session. Pt was able to sit EOB with CGA. Pt continues to sit with a thoracic kyphosis and is slow to initiate movement. Pt was able to ambulate to the doorway and back to the chair (approx. 20') with CGA. Pt continues to have a wide ADRIEL and forward flexed posture while ambulating. Pt will continue to benefit from skilled PT services to improve strength and endurance " deficits. PT will continue to follow and progress as tolerated. Anticipate d/c to SNF. (P)   -CB       Row Name 03/19/25 0956          Positioning and Restraints    Pre-Treatment Position in bed (P)   -CB     Post Treatment Position chair (P)   -CB     In Chair notified nsg;reclined;sitting;call light within reach;encouraged to call for assist;exit alarm on (P)   -CB               User Key  (r) = Recorded By, (t) = Taken By, (c) = Cosigned By      Initials Name Provider Type    Charlene Mora, PT Student PT Student                   Outcome Measures       Row Name 03/19/25 1003 03/19/25 0846       How much help from another person do you currently need...    Turning from your back to your side while in flat bed without using bedrails? 3 (P)   -CB 3  -CC    Moving from lying on back to sitting on the side of a flat bed without bedrails? 3 (P)   -CB 3  -CC    Moving to and from a bed to a chair (including a wheelchair)? 3 (P)   -CB 3  -CC    Standing up from a chair using your arms (e.g., wheelchair, bedside chair)? 3 (P)   -CB 3  -CC    Climbing 3-5 steps with a railing? 2 (P)   -CB 3  -CC    To walk in hospital room? 2 (P)   -CB 3  -CC    AM-PAC 6 Clicks Score (PT) 16 (P)   -CB 18  -CC    Highest Level of Mobility Goal 5 --> Static standing (P)   -CB 6 --> Walk 10 steps or more  -CC      Row Name 03/19/25 1003          Modified Leslie Scale    Modified Leslie Scale 4 - Moderately severe disability.  Unable to walk without assistance, and unable to attend to own bodily needs without assistance. (P)   -CB       Row Name 03/19/25 1003          Functional Assessment    Outcome Measure Options AM-PAC 6 Clicks Basic Mobility (PT) (P)   -CB               User Key  (r) = Recorded By, (t) = Taken By, (c) = Cosigned By      Initials Name Provider Type    Gianna Clancy, RN Registered Nurse    Charlene Mora, PT Student PT Student                                 Physical Therapy Education       Title: PT OT SLP  Therapies (In Progress)       Topic: Physical Therapy (Done)       Point: Mobility training (Done)       Learning Progress Summary            Patient Acceptance, E, VU,NR by CB at 3/19/2025 1004    Acceptance, E, NR,VU by CB at 3/17/2025 1500    Acceptance, TB,E, NR by MB at 3/16/2025 0635    Acceptance, E,TB, NR by MB at 3/15/2025 0652    Acceptance, E, NR by  at 3/14/2025 0932    Acceptance, E,TB, NR by MB at 3/14/2025 0657    Acceptance, E, NR by  at 3/12/2025 1400                      Point: Home exercise program (Done)       Learning Progress Summary            Patient Acceptance, E, NR,VU by CB at 3/17/2025 1500    Acceptance, TB,E, NR by MB at 3/16/2025 0635    Acceptance, E,TB, NR by MB at 3/15/2025 0652    Acceptance, E, NR by  at 3/14/2025 0932    Acceptance, E,TB, NR by MB at 3/14/2025 0657    Acceptance, E, NR by  at 3/12/2025 1400                      Point: Body mechanics (Done)       Learning Progress Summary            Patient Acceptance, E, VU,NR by CB at 3/19/2025 1004    Acceptance, E, NR,VU by CB at 3/17/2025 1500    Acceptance, TB,E, NR by MB at 3/16/2025 0635    Acceptance, E,TB, NR by MB at 3/15/2025 0652    Acceptance, E, NR by  at 3/14/2025 0932    Acceptance, E,TB, NR by MB at 3/14/2025 0657                      Point: Precautions (Done)       Learning Progress Summary            Patient Acceptance, E, VU,NR by CB at 3/19/2025 1004    Acceptance, E, NR,VU by CB at 3/17/2025 1500    Acceptance, TB,E, NR by MB at 3/16/2025 0635    Acceptance, E,TB, NR by MB at 3/15/2025 0652    Acceptance, E, NR by  at 3/14/2025 0932    Acceptance, E,TB, NR by MB at 3/14/2025 0657                                      User Key       Initials Effective Dates Name Provider Type Discipline     06/16/21 -  Alisia Luna, PT Physical Therapist PT    MB 07/24/24 -  Jose Enrique Marino LPN Licensed Nurse Nurse    CB 03/11/25 -  Charlene Staley, PT Student PT Student PT                  PT Recommendation and  Plan     Outcome Evaluation: (P) Pt was alert and oriented x3, very lethargic, pt showed good effort during todays session. Pt was able to sit EOB with CGA. Pt continues to sit with a thoracic kyphosis and is slow to initiate movement. Pt was able to ambulate to the doorway and back to the chair (approx. 20') with CGA. Pt continues to have a wide ADRIEL and forward flexed posture while ambulating. Pt will continue to benefit from skilled PT services to improve strength and endurance deficits. PT will continue to follow and progress as tolerated. Anticipate d/c to SNF.     Time Calculation:         PT Charges       Row Name 03/19/25 1005             Time Calculation    Start Time 0929 (P)   -CB      Stop Time 0946 (P)   -CB      Time Calculation (min) 17 min (P)   -CB      PT Received On 03/19/25 (P)   -CB      PT - Next Appointment 03/20/25 (P)   -CB         Time Calculation- PT    Total Timed Code Minutes- PT 17 minute(s) (P)   -CB         Timed Charges    29575 - PT Therapeutic Activity Minutes 17 (P)   -CB         Total Minutes    Timed Charges Total Minutes 17 (P)   -CB       Total Minutes 17 (P)   -CB                User Key  (r) = Recorded By, (t) = Taken By, (c) = Cosigned By      Initials Name Provider Type    Charlene Mora, PT Student PT Student                      PT G-Codes  Outcome Measure Options: (P) AM-PAC 6 Clicks Basic Mobility (PT)  AM-PAC 6 Clicks Score (PT): (P) 16  AM-PAC 6 Clicks Score (OT): 14  Modified [place] Scale: (P) 4 - Moderately severe disability.  Unable to walk without assistance, and unable to attend to own bodily needs without assistance.  PT Discharge Summary  Anticipated Discharge Disposition (PT): (P) skilled nursing facility    Charlene Staley PT Student  3/19/2025

## 2025-03-20 VITALS
DIASTOLIC BLOOD PRESSURE: 51 MMHG | TEMPERATURE: 97.8 F | HEIGHT: 72 IN | BODY MASS INDEX: 20.87 KG/M2 | HEART RATE: 68 BPM | RESPIRATION RATE: 18 BRPM | WEIGHT: 154.1 LBS | SYSTOLIC BLOOD PRESSURE: 121 MMHG | OXYGEN SATURATION: 98 %

## 2025-03-20 LAB
ANION GAP SERPL CALCULATED.3IONS-SCNC: 10.5 MMOL/L (ref 5–15)
BASOPHILS # BLD AUTO: 0.01 10*3/MM3 (ref 0–0.2)
BASOPHILS NFR BLD AUTO: 0.2 % (ref 0–1.5)
BUN SERPL-MCNC: 70 MG/DL (ref 8–23)
BUN/CREAT SERPL: 16.9 (ref 7–25)
CALCIUM SPEC-SCNC: 7.9 MG/DL (ref 8.6–10.5)
CHLORIDE SERPL-SCNC: 98 MMOL/L (ref 98–107)
CO2 SERPL-SCNC: 21.5 MMOL/L (ref 22–29)
CREAT SERPL-MCNC: 4.15 MG/DL (ref 0.76–1.27)
DEPRECATED RDW RBC AUTO: 44.3 FL (ref 37–54)
EGFRCR SERPLBLD CKD-EPI 2021: 14.8 ML/MIN/1.73
EOSINOPHIL # BLD AUTO: 0.12 10*3/MM3 (ref 0–0.4)
EOSINOPHIL NFR BLD AUTO: 2 % (ref 0.3–6.2)
ERYTHROCYTE [DISTWIDTH] IN BLOOD BY AUTOMATED COUNT: 14.5 % (ref 12.3–15.4)
GLUCOSE BLDC GLUCOMTR-MCNC: 122 MG/DL (ref 70–130)
GLUCOSE BLDC GLUCOMTR-MCNC: 141 MG/DL (ref 70–130)
GLUCOSE BLDC GLUCOMTR-MCNC: 226 MG/DL (ref 70–130)
GLUCOSE SERPL-MCNC: 131 MG/DL (ref 65–99)
HCT VFR BLD AUTO: 26.1 % (ref 37.5–51)
HGB BLD-MCNC: 8.3 G/DL (ref 13–17.7)
IMM GRANULOCYTES # BLD AUTO: 0.02 10*3/MM3 (ref 0–0.05)
IMM GRANULOCYTES NFR BLD AUTO: 0.3 % (ref 0–0.5)
LYMPHOCYTES # BLD AUTO: 1 10*3/MM3 (ref 0.7–3.1)
LYMPHOCYTES NFR BLD AUTO: 16.6 % (ref 19.6–45.3)
MCH RBC QN AUTO: 26.9 PG (ref 26.6–33)
MCHC RBC AUTO-ENTMCNC: 31.8 G/DL (ref 31.5–35.7)
MCV RBC AUTO: 84.5 FL (ref 79–97)
MONOCYTES # BLD AUTO: 0.67 10*3/MM3 (ref 0.1–0.9)
MONOCYTES NFR BLD AUTO: 11.1 % (ref 5–12)
NEUTROPHILS NFR BLD AUTO: 4.2 10*3/MM3 (ref 1.7–7)
NEUTROPHILS NFR BLD AUTO: 69.8 % (ref 42.7–76)
NRBC BLD AUTO-RTO: 0 /100 WBC (ref 0–0.2)
PLATELET # BLD AUTO: 233 10*3/MM3 (ref 140–450)
PMV BLD AUTO: 10.5 FL (ref 6–12)
POTASSIUM SERPL-SCNC: 4 MMOL/L (ref 3.5–5.2)
RBC # BLD AUTO: 3.09 10*6/MM3 (ref 4.14–5.8)
SODIUM SERPL-SCNC: 130 MMOL/L (ref 136–145)
WBC NRBC COR # BLD AUTO: 6.02 10*3/MM3 (ref 3.4–10.8)

## 2025-03-20 PROCEDURE — 36415 COLL VENOUS BLD VENIPUNCTURE: CPT | Performed by: NURSE PRACTITIONER

## 2025-03-20 PROCEDURE — 80048 BASIC METABOLIC PNL TOTAL CA: CPT | Performed by: NURSE PRACTITIONER

## 2025-03-20 PROCEDURE — 25010000002 HEPARIN (PORCINE) PER 1000 UNITS: Performed by: INTERNAL MEDICINE

## 2025-03-20 PROCEDURE — 85025 COMPLETE CBC W/AUTO DIFF WBC: CPT | Performed by: NURSE PRACTITIONER

## 2025-03-20 PROCEDURE — 97530 THERAPEUTIC ACTIVITIES: CPT

## 2025-03-20 PROCEDURE — 63710000001 INSULIN LISPRO (HUMAN) PER 5 UNITS: Performed by: NURSE PRACTITIONER

## 2025-03-20 PROCEDURE — 82948 REAGENT STRIP/BLOOD GLUCOSE: CPT

## 2025-03-20 RX ORDER — AMOXICILLIN 500 MG/1
500 CAPSULE ORAL EVERY 12 HOURS SCHEDULED
Qty: 5 CAPSULE | Refills: 0 | Status: SHIPPED | OUTPATIENT
Start: 2025-03-20 | End: 2025-03-23

## 2025-03-20 RX ORDER — MANNITOL 250 MG/ML
12.5 INJECTION, SOLUTION INTRAVENOUS AS NEEDED
Status: CANCELLED | OUTPATIENT
Start: 2025-03-20 | End: 2025-03-21

## 2025-03-20 RX ORDER — AMIODARONE HYDROCHLORIDE 200 MG/1
200 TABLET ORAL
Qty: 30 TABLET | Refills: 0 | Status: SHIPPED | OUTPATIENT
Start: 2025-03-21 | End: 2025-04-20

## 2025-03-20 RX ORDER — MIDODRINE HYDROCHLORIDE 5 MG/1
5 TABLET ORAL
Qty: 90 TABLET | Refills: 0 | Status: SHIPPED | OUTPATIENT
Start: 2025-03-20

## 2025-03-20 RX ORDER — HEPARIN SODIUM 1000 [USP'U]/ML
2000 INJECTION, SOLUTION INTRAVENOUS; SUBCUTANEOUS ONCE
Status: COMPLETED | OUTPATIENT
Start: 2025-03-20 | End: 2025-03-20

## 2025-03-20 RX ADMIN — HEPARIN SODIUM 2000 UNITS: 1000 INJECTION, SOLUTION INTRAVENOUS; SUBCUTANEOUS at 13:44

## 2025-03-20 RX ADMIN — Medication 10 ML: at 09:19

## 2025-03-20 RX ADMIN — PANTOPRAZOLE SODIUM 40 MG: 40 TABLET, DELAYED RELEASE ORAL at 09:17

## 2025-03-20 RX ADMIN — MIDODRINE HYDROCHLORIDE 5 MG: 5 TABLET ORAL at 17:14

## 2025-03-20 RX ADMIN — ASPIRIN 325 MG: 325 TABLET, COATED ORAL at 09:17

## 2025-03-20 RX ADMIN — APIXABAN 2.5 MG: 2.5 TABLET, FILM COATED ORAL at 09:17

## 2025-03-20 RX ADMIN — AMIODARONE HYDROCHLORIDE 200 MG: 200 TABLET ORAL at 09:18

## 2025-03-20 RX ADMIN — AMOXICILLIN 500 MG: 250 CAPSULE ORAL at 09:17

## 2025-03-20 RX ADMIN — MIDODRINE HYDROCHLORIDE 5 MG: 5 TABLET ORAL at 12:34

## 2025-03-20 RX ADMIN — INSULIN LISPRO 3 UNITS: 100 INJECTION, SOLUTION INTRAVENOUS; SUBCUTANEOUS at 12:34

## 2025-03-20 RX ADMIN — TAMSULOSIN HYDROCHLORIDE 0.4 MG: 0.4 CAPSULE ORAL at 09:18

## 2025-03-20 RX ADMIN — LEVOTHYROXINE SODIUM 75 MCG: 0.07 TABLET ORAL at 05:55

## 2025-03-20 RX ADMIN — MIDODRINE HYDROCHLORIDE 5 MG: 5 TABLET ORAL at 09:20

## 2025-03-20 RX ADMIN — MUPIROCIN 1 APPLICATION: 20 OINTMENT TOPICAL at 09:21

## 2025-03-20 RX ADMIN — OXYMETAZOLINE HYDROCHLORIDE 2 SPRAY: 0.5 SPRAY NASAL at 09:21

## 2025-03-20 RX ADMIN — PANTOPRAZOLE SODIUM 40 MG: 40 TABLET, DELAYED RELEASE ORAL at 17:14

## 2025-03-20 NOTE — PROGRESS NOTES
[facility] Progress Note      Patient Name: Carlito Mo  : 1955  MRN: [id number]  Primary Care Physician:  Belle Simons APRN  Date of admission: 3/10/2025    Subjective     Interval History:   F/U ESRD  The patient is feeling the same, denies any chest pain or shortness of air, no orthopnea or PND, no nausea or vomiting he is complaining of stuffy nose.  The patient had dialysis yesterday without any difficulties.  Review of Systems:   As noted above    Objective     Vitals:   Temp:  [96.8 °F (36 °C)-98.1 °F (36.7 °C)] 97.6 °F (36.4 °C)  Heart Rate:  [61-76] 61  Resp:  [16-20] 16  BP: (120-135)/(55-76) 129/55  No intake or output data in the 24 hours ending 25 0835      Physical Exam:    General Appearance: Awake, alert, chronically ill and slow answering questions   Neck: No JVD  Lungs: Bilateral rhonchi, breathing effort not labored  Heart: Irregularly irregular, no  Abdomen: soft, nontender, nondistended  Extremities: no edema, cyanosis or clubbing ,functional AV fistula in the right forearm with good thrill and bruit      Scheduled Meds:     amiodarone, 200 mg, Oral, Q24H  amoxicillin, 500 mg, Oral, Q12H  apixaban, 2.5 mg, Oral, BID  aspirin, 325 mg, Oral, Daily  atorvastatin, 80 mg, Oral, Nightly  insulin lispro, 2-7 Units, Subcutaneous, 4x Daily AC & at Bedtime  levothyroxine, 75 mcg, Oral, Q AM  midodrine, 5 mg, Oral, TID AC  mupirocin, 1 Application, Each Nare, BID  oxymetazoline, 2 spray, Each Nare, BID  pantoprazole, 40 mg, Oral, BID AC  sodium chloride, 10 mL, Intravenous, Q12H  tamsulosin, 0.4 mg, Oral, Daily      IV Meds:   phenylephrine, 0.5-3 mcg/kg/min, Last Rate: Stopped (25 0545)        Results Reviewed:   I have personally reviewed the results from the time of this admission to 3/20/2025 08:35 EDT     Results from last 7 days   Lab Units 03/20/25  0611 25  0814 25  0733 25  0427 25  1138   SODIUM mmol/L 130* 132* 133*    < > 138   POTASSIUM mmol/L 4.0 3.8 4.2   < > 4.1  4.1   CHLORIDE mmol/L 98 99 97*   < > 101   CO2 mmol/L 21.5* 24.0 19.8*   < > 22.6   BUN mg/dL 70* 47* 69*   < > 51*   CREATININE mg/dL 4.15* 3.57* 5.18*   < > 3.51*   CALCIUM mg/dL 7.9* 8.1* 7.7*   < > 8.4*   BILIRUBIN mg/dL  --   --   --   --  0.3   ALK PHOS U/L  --   --   --   --  104   ALT (SGPT) U/L  --   --   --   --  17   AST (SGOT) U/L  --   --   --   --  18   GLUCOSE mg/dL 131* 130* 98   < > 140*    < > = values in this interval not displayed.     Estimated Creatinine Clearance: 16.6 mL/min (A) (by C-G formula based on SCr of 4.15 mg/dL (H)).  Results from last 7 days   Lab Units 03/19/25  0814 03/18/25  0733 03/16/25  1138 03/14/25  0414   MAGNESIUM mg/dL  --   --  1.8 1.9   PHOSPHORUS mg/dL 2.5 4.1  --  3.2         Results from last 7 days   Lab Units 03/20/25  0611 03/19/25  0814 03/18/25  0733 03/17/25  0427 03/16/25  1151   WBC 10*3/mm3 6.02 4.11 4.89 4.65 10.70   HEMOGLOBIN g/dL 8.3* 9.0* 8.5* 8.7* 9.4*   PLATELETS 10*3/mm3 233 251 218 216 233           Assessment / Plan     ASSESSMENT:  ESRD - HD MWF via RUE AVF - TTS this week (dialyzed Saturday) as will be his schedule at rehab ([facility]).  He appears to be euvolemic, sodium is 130 today   Hypotension -related to A-fib with RVR, improved significant  S/P fall with generalized weakness - plan is rehab   DM2, mgmt per primary team  AF/RVR -followed by cardio  BPH, on flomax   Anemia of CKD, lab error re: initial hgb (8.5 -> 11); hemoglobin yesterday 8.5, stable      PLAN:  Continue the same treatment  Continue midodrine  Hemodialysis today  The patient could be transferred to [facility] anytime from the renal standpoint      I reviewed the chart and other providers notes, reviewed labs.  I discussed the case with the patient  Copied text in this note has been reviewed and is accurate as of 03/20/25.         Cameron Olguin MD  03/20/25  08:35 EDT    [facility]  [phone]

## 2025-03-20 NOTE — DISCHARGE SUMMARY
Date of Discharge:  3/20/2025    PCP: Belle Simons APRN    Discharge Diagnosis:   Active Hospital Problems    Diagnosis  POA    **Generalized weakness [R53.1]  Yes    Ileus [K56.7]  Unknown    Chronic HFrEF (heart failure with reduced ejection fraction) [I50.22]  Yes    ESRD (end stage renal disease) [N18.6]  Yes    History of stroke [Z86.73]  Not Applicable    Paroxysmal atrial fibrillation [I48.0]  Yes    Embolic stroke [I63.9]  Yes    Essential hypertension [I10]  Yes    Coronary artery disease involving native coronary artery of native heart without angina pectoris [I25.10]  Yes    Acquired hypothyroidism [E03.9]  Yes    Type 2 diabetes mellitus, with long-term current use of insulin [E11.9, Z79.4]  Not Applicable      Resolved Hospital Problems   No resolved problems to display.          Consults       Date and Time Order Name Status Description    3/16/2025 10:59 AM Inpatient Cardiology Consult Completed     3/16/2025 10:55 AM Inpatient Pulmonology Consult Completed     3/16/2025  5:49 AM Inpatient General Surgery Consult Completed     3/10/2025  9:51 PM Inpatient Nephrology Consult Completed     3/10/2025  9:38 PM Nephrology (on -call MD unless specified)      3/10/2025  9:26 PM [facility] (on-call MD unless specified) Details            Hospital Course  Patient is a 69 y.o. male     Patient is a 69 y.o. male ESRD on dialysis, CAD, Afib and h/o stroke on eliquis who presented with increasing weakness and a fall at home , right hip pain- XR negative - no further complaints of pain.     His clinical course complicated with Aflutter with RVR and hypotension. Had ordered IV bolus as well as IV digoxin x 1 along with midodrine. Cardiology has seen in consultation. Rapid response called and discussion with specialists recommended transfer to the ICU in case needs pressor agents especially in light of low BP, high HR, and ESRD status limited fluids. Labs look ok including normal WBC count and lactate so doesn't seem  infectious at least currently. Blood cultures drawn this morning. Cdiff negative.    Patient also has end-stage renal disease, he is dependent on hemodialysis with continued hemodialysis based on nephrology recommendations.    He also had pneumonia which was seen on CAT scan, we had continued antibiotics, patient has been transitioned to amoxicillin, he will finish the course of antibiotics on outpatient basis.    His hypotension, related to A-fib, now has resolved.  Patient was seen by cardiology and nephrology service.  Patient has been cleared by both services for discharge.  I have seen and examined patient at bedside, patient will be discharged in a stable condition to rehab facility.  Time spent is more than 30 minutes.    Temp:  [97.6 °F (36.4 °C)-98.1 °F (36.7 °C)] 97.6 °F (36.4 °C)  Heart Rate:  [61-76] 75  Resp:  [16-20] 16  BP: (120-137)/(55-76) 135/61  Body mass index is 20.9 kg/m².    Physical Exam    General, awake and alert.  Head and ENT, normocephalic and atraumatic.  Lungs, symmetric expansion, equal air entry bilaterally.  Heart, regular rate and rhythm.  Abdomen, soft and nontender.  Extremities, no clubbing or cyanosis.  Neuro, no focal deficits.  Skin: Warm and no rash.  Psych, normal mood and affect.  Musculoskeletal, joint examination is grossly normal.    Disposition: Rehab Facility or Unit (DC - External)       Discharge Medications        New Medications        Instructions Start Date   amiodarone 200 MG tablet  Commonly known as: PACERONE   200 mg, Oral, Every 24 Hours Scheduled   Start Date: March 21, 2025     amoxicillin 500 MG capsule  Commonly known as: AMOXIL   500 mg, Oral, Every 12 Hours Scheduled      aspirin 81 MG EC tablet  Replaces: aspirin 325 MG tablet   81 mg, Oral, Daily      atorvastatin 80 MG tablet  Commonly known as: LIPITOR   80 mg, Oral, Nightly      insulin lispro 100 UNIT/ML injection  Commonly known as: HUMALOG/ADMELOG   2-7 Units, Subcutaneous, 4 Times Daily  Before Meals & Nightly      midodrine 5 MG tablet  Commonly known as: PROAMATINE   5 mg, Oral, 3 Times Daily Before Meals             Continue These Medications        Instructions Start Date   Eliquis 2.5 MG tablet tablet  Generic drug: apixaban   2.5 mg, Oral, 2 Times Daily      levothyroxine 75 MCG tablet  Commonly known as: SYNTHROID, LEVOTHROID   75 mcg, Oral, Every Early Morning      pantoprazole 40 MG EC tablet  Commonly known as: PROTONIX   40 mg, Oral, 2 Times Daily Before Meals      tamsulosin 0.4 MG capsule 24 hr capsule  Commonly known as: FLOMAX   0.4 mg, Oral, Daily             Stop These Medications      Anti-Diarrheal 2 MG tablet  Generic drug: loperamide     aspirin 325 MG tablet  Replaced by: aspirin 81 MG EC tablet     budesonide-formoterol 160-4.5 MCG/ACT inhaler  Commonly known as: SYMBICORT     famotidine 20 MG tablet  Commonly known as: PEPCID     ipratropium-albuterol  MCG/ACT inhaler  Commonly known as: COMBIVENT RESPIMAT     vitamin D3 125 MCG (5000 UT) capsule capsule               Additional Instructions for the Follow-ups that You Need to Schedule       Discharge Follow-up with PCP   As directed       Currently Documented PCP:    Belle Simons APRN    PCP Phone Number:    [phone]     Follow Up Details: Follow-up with PCP in 7 days.               Contact information for follow-up providers       Belle Simons APRN .    Specialty: Family Medicine  Contact information:  [facility] [place] [address]  [phone]               Julio C Juan Jr., MD. Schedule an appointment as soon as possible for a visit in 1 month(s).    Specialty: General Surgery  Contact information:  [address] [facility] [facility] [address]  [place] [address]  [phone]               Belle Simons APRN .    Specialty: Family Medicine  Why: Follow-up with PCP in 7 days.  Contact information:  [address] [place] [address]  [phone]                       Contact information for  after-discharge care       Destination       [facility] .    Service: Skilled Nursing  Contact information:  [address] [facility] [address] [phone]                                  Future Appointments   Date Time Provider Department Center   4/16/2025  2:00 PM Nida Lopes APRN [facility]   10/1/2025  2:00 PM Ania Amaya MD [facility]     Pending Labs       Order Current Status    Blood Culture - Blood, Arm, Left Preliminary result    Blood Culture - Blood, Arm, Right Preliminary result           Jacques Alonzo MD  [facility]  03/20/25 14:47 EDT    Discharge time spent greater than 30 minutes.

## 2025-03-20 NOTE — NURSING NOTE
Dialysis completed without complications as ordered by MD. Vss post tx. Right forearm fistula de-accessed, 15g needles removed x 2 sites, hemostasis achieved, each site covered with gauze and secured with tape. Total fluid removed 1.2 liter and verbal report given to primary nurse, Eva.

## 2025-03-20 NOTE — THERAPY TREATMENT NOTE
Patient Name: Carlito Mo  : 1955    MRN: [id number]                              Today's Date: 3/20/2025       Admit Date: 3/10/2025    Visit Dx:     ICD-10-CM ICD-9-CM   1. Generalized weakness  R53.1 780.79   2. ESRD on dialysis  N18.6 585.6    Z99.2 V45.11   3. Fall, initial encounter  W19.XXXA E888.9     Patient Active Problem List   Diagnosis    Major depressive disorder with single episode, in partial remission    Other hyperlipidemia    Type 2 diabetes mellitus, with long-term current use of insulin    Vitamin D deficiency    Erectile dysfunction    Chronic fatigue    Type 2 diabetes mellitus with ophthalmic complication    Benign prostatic hyperplasia with nocturia    History of basal cell carcinoma    Acquired hypothyroidism    Recurrent strokes    Suspected sleep apnea    YAW (obstructive sleep apnea)    Coronary artery disease involving native coronary artery of native heart without angina pectoris    Chronically elevated troponin    Colon cancer screening    Enlarged lymph nodes    Functional gait abnormality    History of myocardial infarction    Insomnia    Iron deficiency anemia    Major depression, recurrent    Essential hypertension    Acute on chronic renal insufficiency    Falls frequently    Acute on chronic anemia    Neurogenic orthostatic hypotension    Anemia, chronic disease    History of colon polyps    Helicobacter pylori gastritis    Esophagitis    Embolic stroke    Patent foramen ovale    Cardiomyopathy    Diarrhea    Generalized weakness    Paroxysmal atrial fibrillation    Chronic anticoagulation    Acute ischemic stroke    History of stroke    Iron deficiency anemia    Acute HFrEF (heart failure with reduced ejection fraction)    ESRD (end stage renal disease)    Hemoptysis    Chronic HFrEF (heart failure with reduced ejection fraction)    Hemodialysis patient    Exertional dyspnea    Severe malnutrition    Ataxia    Cerebellar stroke, acute    Medically noncompliant     Vertebral artery stenosis, bilateral    Carotid stenosis, right    Ileus     Past Medical History:   Diagnosis Date    Acquired hypothyroidism     Acute sinusitis     SYLVAIN (acute kidney injury)     on CKD    Anemia     Arthritis     Atrial fibrillation     CAD (coronary artery disease)     Chronic combined systolic and diastolic congestive heart failure     COPD (chronic obstructive pulmonary disease)     Depression     Diabetic neuropathy 04/11/2022    Diabetes mellitus due to underlying condition    Esophagitis 06/10/2020    Added automatically from request for surgery [id number]      ESRD (end stage renal disease) 12/01/2023    Frequent PVCs     Generalized weakness     GERD (gastroesophageal reflux disease)     Helicobacter pylori gastritis 06/04/2020    Hyperlipidemia     Hypertension     Hypertensive encephalopathy     Pleural effusion     Pneumonia     Poor historian     RBBB (right bundle branch block)     Stroke     POOR VISION    TIA (transient ischemic attack)     Type 2 diabetes mellitus     Urinary retention     Vitamin D deficiency      Past Surgical History:   Procedure Laterality Date    APPENDECTOMY N/A 02/14/2016    Dr. Alexey Dodson    ARTERIOVENOUS FISTULA/SHUNT SURGERY Right 06/03/2022    Procedure: RIGHT FOREARM ARTERIOVENOUS FISTULA PLACEMENT RADIOCEPHALIC WITH CEPHALIC VEIN TRANSPOSITION;  Surgeon: Eliseo Willis MD;  Location: [facility] MAIN OR;  Service: Vascular;  Laterality: Right;    COLONOSCOPY      over 20 years ago.  no polyps     COLONOSCOPY N/A 09/18/2018    Procedure: COLONOSCOPY;  Surgeon: Jose Enrique Louie MD;  Location: [facility] ENDOSCOPY;  Service: Gastroenterology    COLONOSCOPY N/A 09/19/2018    IH, EH, polyps (TAs w/low grade dysplasia)    COLONOSCOPY N/A 06/01/2020    Procedure: COLONOSCOPY;  Surgeon: Jose Enrique Louie MD;  Location: [facility] ENDOSCOPY;  Service: Gastroenterology;  Laterality: N/A;  Pre op-Anemia, Melena, History of Polyps  Post op-To Cecum/TI, Polyp,  Poor Prep    CORONARY ARTERY BYPASS GRAFT  11/2001    ENDOSCOPY N/A 05/29/2020    Procedure: ESOPHAGOGASTRODUODENOSCOPY with biopsies;  Surgeon: Amanda Lovelace MD;  Location: [facility] ENDOSCOPY;  Service: Gastroenterology;  Laterality: N/A;  pre-anemia, dark stools  post-esophagitis, hiatal hernia    ENDOSCOPY N/A 09/15/2020    Procedure: ESOPHAGOGASTRODUODENOSCOPY WITH BIOPSIES;  Surgeon: Jose Enrique Louie MD;  Location: [facility] ENDOSCOPY;  Service: Gastroenterology;  Laterality: N/A;  pre: history of melena and esophagitis  post: mild esophagitis and gastritis, small hiatal hernia    ENDOSCOPY N/A 12/4/2023    Procedure: ESOPHAGOGASTRODUODENOSCOPY with bx;  Surgeon: Quoc Elmore MD;  Location: [facility] ENDOSCOPY;  Service: Gastroenterology;  Laterality: N/A;  pre: Coffee-ground emesis  post: hiatal hernia, esophagitis    HERNIA REPAIR Left 12/05/2019    THORACENTESIS      TONSILLECTOMY      VASECTOMY        General Information       Row Name 03/20/25 1137          Physical Therapy Time and Intention    Document Type therapy note (daily note) (P)   -CB     Mode of Treatment physical therapy (P)   -CB       Row Name 03/20/25 1137          General Information    Existing Precautions/Restrictions fall (P)   -CB       Row Name 03/20/25 1137          Cognition    Orientation Status (Cognition) oriented x 3 (P)   -CB       Row Name 03/20/25 1137          Safety Issues/Impairments Affecting Functional Mobility    Impairments Affecting Function (Mobility) balance;strength;endurance/activity tolerance;postural/trunk control (P)   -CB               User Key  (r) = Recorded By, (t) = Taken By, (c) = Cosigned By      Initials Name Provider Type    CB Charlene Staley, PT Student PT Student                   Mobility       Row Name 03/20/25 1138          Bed Mobility    Bed Mobility supine-sit (P)   -CB     Supine-Sit [place] (Bed Mobility) contact guard (P)   -CB     Assistive Device (Bed Mobility) head of bed  elevated;bed rails (P)   -CB       Row Name 03/20/25 1138          Sit-Stand Transfer    Sit-Stand [place] (Transfers) minimum assist (75% patient effort) (P)   -CB     Assistive Device (Sit-Stand Transfers) walker, front-wheeled (P)   -CB       Row Name 03/20/25 1138          Gait/Stairs (Locomotion)    [place] Level (Gait) contact guard (P)   -CB     Assistive Device (Gait) walker, front-wheeled (P)   -CB     Distance in Feet (Gait) 20 (P)   -CB     Deviations/Abnormal Patterns (Gait) weight shifting decreased;base of support, wide;eli decreased;gait speed decreased (P)   -CB     Bilateral Gait Deviations forward flexed posture;heel strike decreased (P)   -CB     Comment, (Gait/Stairs) Ambulated to doorway and ended the session in the chair, continues to have thoracic kyphosis and a wide ADRIEL while ambulating, continues to be slow at initiating movement. (P)   -CB               User Key  (r) = Recorded By, (t) = Taken By, (c) = Cosigned By      Initials Name Provider Type    Charlene Mora, PT Student PT Student                   Obj/Interventions       Row Name 03/20/25 1141          Motor Skills    Therapeutic Exercise -- (P)   AP, LAQ x5 reps  -CB       Row Name 03/20/25 1141          Balance    Comment, Balance continues to have a forward flexed posture seated and standing. (P)   -CB               User Key  (r) = Recorded By, (t) = Taken By, (c) = Cosigned By      Initials Name Provider Type    Charlene Mora, PT Student PT Student                   Goals/Plan    No documentation.                  Clinical Impression       Row Name 03/20/25 1142          Pain    Pretreatment Pain Rating 0/10 - no pain (P)   -CB     Posttreatment Pain Rating 0/10 - no pain (P)   -CB       Row Name 03/20/25 1142          Plan of Care Review    Plan of Care Reviewed With patient (P)   -CB     Outcome Evaluation Pt was alert and oriented x3, encouraged to do PT. Pt showed good effort during todays session. Pt  continues to be slow at initiating movement and was very lethargic today. Pt needed Shakeel to be able to go from supine to sit at EOB due to the pt reporting feeling weaker today. Pt was able to ambulate 20' with CGA. Pt continues to have thoracic kyphosis and a wide ADRIEL while ambulating. Pt was able to sit in the chair at the end of the session with CGA. Pt will continue to benefit from PT services to further improve his strength, balance, and endurance. PT will continue to follow. Anticipate d/c to SNF. (P)   -CB       Row Name 03/20/25 1145          Positioning and Restraints    Pre-Treatment Position in bed (P)   -CB     Post Treatment Position chair (P)   -CB     In Chair notified nsg;reclined;sitting;call light within reach;encouraged to call for assist;exit alarm on (P)   -CB               User Key  (r) = Recorded By, (t) = Taken By, (c) = Cosigned By      Initials Name Provider Type    CB Charlene Staley, PT Student PT Student                   Outcome Measures       Row Name 03/20/25 3442          How much help from another person do you currently need...    Turning from your back to your side while in flat bed without using bedrails? 3 (P)   -CB     Moving from lying on back to sitting on the side of a flat bed without bedrails? 3 (P)   -CB     Moving to and from a bed to a chair (including a wheelchair)? 3 (P)   -CB     Standing up from a chair using your arms (e.g., wheelchair, bedside chair)? 3 (P)   -CB     Climbing 3-5 steps with a railing? 2 (P)   -CB     To walk in hospital room? 3 (P)   -CB     AM-PAC 6 Clicks Score (PT) 17 (P)   -CB     Highest Level of Mobility Goal 5 --> Static standing (P)   -CB       Row Name 03/20/25 4718          Modified [place] Scale    Modified [place] Scale 4 - Moderately severe disability.  Unable to walk without assistance, and unable to attend to own bodily needs without assistance. (P)   -CB       Row Name 03/20/25 1143          Functional Assessment    Outcome Measure  Options AM-PAC 6 Clicks Basic Mobility (PT) (P)   -CB               User Key  (r) = Recorded By, (t) = Taken By, (c) = Cosigned By      Initials Name Provider Type    Charlene Mora, PT Student PT Student                                 Physical Therapy Education       Title: PT OT SLP Therapies (In Progress)       Topic: Physical Therapy (Done)       Point: Mobility training (Done)       Learning Progress Summary            Patient Acceptance, E, NR,VU by CB at 3/20/2025 1255    Acceptance, E, VU,NR by CB at 3/19/2025 1004    Acceptance, E, NR,VU by CB at 3/17/2025 1500    Acceptance, TB,E, NR by MB at 3/16/2025 0635    Acceptance, E,TB, NR by MB at 3/15/2025 0652    Acceptance, E, NR by  at 3/14/2025 0932    Acceptance, E,TB, NR by MB at 3/14/2025 0657    Acceptance, E, NR by  at 3/12/2025 1400                      Point: Home exercise program (Done)       Learning Progress Summary            Patient Acceptance, E, NR,VU by CB at 3/20/2025 1255    Acceptance, E, NR,VU by CB at 3/17/2025 1500    Acceptance, TB,E, NR by MB at 3/16/2025 0635    Acceptance, E,TB, NR by MB at 3/15/2025 0652    Acceptance, E, NR by  at 3/14/2025 0932    Acceptance, E,TB, NR by MB at 3/14/2025 0657    Acceptance, E, NR by  at 3/12/2025 1400                      Point: Body mechanics (Done)       Learning Progress Summary            Patient Acceptance, E, NR,VU by CB at 3/20/2025 1255    Acceptance, E, VU,NR by CB at 3/19/2025 1004    Acceptance, E, NR,VU by CB at 3/17/2025 1500    Acceptance, TB,E, NR by MB at 3/16/2025 0635    Acceptance, E,TB, NR by MB at 3/15/2025 0652    Acceptance, E, NR by  at 3/14/2025 0932    Acceptance, E,TB, NR by MB at 3/14/2025 0657                      Point: Precautions (Done)       Learning Progress Summary            Patient Acceptance, E, NR,VU by CB at 3/20/2025 1255    Acceptance, E, VU,NR by CB at 3/19/2025 1004    Acceptance, E, NR,VU by CB at 3/17/2025 1500    Acceptance, TB,E, NR by MB  at 3/16/2025 0635    Acceptance, E,TB, NR by MB at 3/15/2025 0652    Acceptance, E, NR by  at 3/14/2025 0932    Acceptance, E,TB, NR by MB at 3/14/2025 0657                                      User Key       Initials Effective Dates Name Provider Type Discipline     06/16/21 -  Alisia Luna, PT Physical Therapist PT    MB 07/24/24 -  Jose Enrique Marino LPN Licensed Nurse Nurse     03/11/25 -  Charlene Staley, PT Student PT Student PT                  PT Recommendation and Plan     Outcome Evaluation: (P) Pt was alert and oriented x3, encouraged to do PT. Pt showed good effort during todays session. Pt continues to be slow at initiating movement and was very lethargic today. Pt needed Shakeel to be able to go from supine to sit at EOB due to the pt reporting feeling weaker today. Pt was able to ambulate 20' with CGA. Pt continues to have thoracic kyphosis and a wide ADRIEL while ambulating. Pt was able to sit in the chair at the end of the session with CGA. Pt will continue to benefit from PT services to further improve his strength, balance, and endurance. PT will continue to follow. Anticipate d/c to SNF.     Time Calculation:         PT Charges       Row Name 03/20/25 1256             Time Calculation    Start Time 1055 (P)   -CB      Stop Time 1108 (P)   -CB      Time Calculation (min) 13 min (P)   -CB      PT Received On 03/20/25 (P)   -CB      PT - Next Appointment 03/21/25 (P)   -CB         Time Calculation- PT    Total Timed Code Minutes- PT 13 minute(s) (P)   -CB         Timed Charges    53413 - PT Therapeutic Activity Minutes 13 (P)   -CB         Total Minutes    Timed Charges Total Minutes 13 (P)   -CB       Total Minutes 13 (P)   -CB                User Key  (r) = Recorded By, (t) = Taken By, (c) = Cosigned By      Initials Name Provider Type    CB Charlene Staley, PT Student PT Student                      PT G-Codes  Outcome Measure Options: (P) AM-PAC 6 Clicks Basic Mobility (PT)  AM-PAC 6 Clicks Score (PT):  (P) 17  AM-PAC 6 Clicks Score (OT): 14  Modified [place] Scale: (P) 4 - Moderately severe disability.  Unable to walk without assistance, and unable to attend to own bodily needs without assistance.  PT Discharge Summary  Anticipated Discharge Disposition (PT): (P) skilled nursing facility    Charlene Staley, PT Student  3/20/2025

## 2025-03-20 NOTE — PLAN OF CARE
Goal Outcome Evaluation:  Plan of Care Reviewed With: (P) patient           Outcome Evaluation: (P) Pt was alert and oriented x3, encouraged to do PT. Pt showed good effort during todays session. Pt continues to be slow at initiating movement and was very lethargic today. Pt needed Shakeel to be able to go from supine to sit at EOB due to the pt reporting feeling weaker today. Pt was able to ambulate 20' with CGA. Pt continues to have thoracic kyphosis and a wide ADRIEL while ambulating. Pt was able to sit in the chair at the end of the session with CGA. Pt will continue to benefit from PT services to further improve his strength, balance, and endurance. PT will continue to follow. Anticipate d/c to SNF.    Anticipated Discharge Disposition (PT): (P) skilled nursing facility

## 2025-03-21 LAB
BACTERIA SPEC AEROBE CULT: NORMAL
BACTERIA SPEC AEROBE CULT: NORMAL

## 2025-03-21 NOTE — CASE MANAGEMENT/SOCIAL WORK
Case Management Discharge Note      Final Note: [facility] SN[facility]    Provided Post Acute Provider List?: Yes  Post Acute Provider List: Nursing Home  Provided Post Acute Provider Quality & Resource List?: Yes  Post Acute Provider Quality and Resource List: Nursing Home  Delivered To: Patient  Method of Delivery: In person    Selected Continued Care - Discharged on 3/20/2025 Admission date: 3/10/2025 - Discharge disposition: Rehab Facility or Unit (DC - External)      Destination Coordination complete.      Service Provider Services Address Phone Fax Patient Preferred    [facility] [facility] [id number] [phone] --              Durable Medical Equipment    No services have been selected for the patient.                Dialysis/Infusion    No services have been selected for the patient.                Home Medical Care    No services have been selected for the patient.                Therapy    No services have been selected for the patient.                Community Resources    No services have been selected for the patient.                Community & DME    No services have been selected for the patient.                         Final Discharge Disposition Code: 03 - skilled nursing facility (SNF)

## 2025-03-23 ENCOUNTER — HOSPITAL ENCOUNTER (EMERGENCY)
Facility: HOSPITAL | Age: 70
Discharge: NURSING, REHAB OR LONG-TERM CARE | End: 2025-03-23
Attending: EMERGENCY MEDICINE | Admitting: EMERGENCY MEDICINE
Payer: MEDICARE

## 2025-03-23 VITALS
DIASTOLIC BLOOD PRESSURE: 66 MMHG | OXYGEN SATURATION: 95 % | SYSTOLIC BLOOD PRESSURE: 142 MMHG | TEMPERATURE: 97.1 F | HEART RATE: 72 BPM | RESPIRATION RATE: 18 BRPM

## 2025-03-23 DIAGNOSIS — T82.838A BLEEDING FROM DIALYSIS SHUNT, INITIAL ENCOUNTER: Primary | ICD-10-CM

## 2025-03-23 LAB
ABO GROUP BLD: NORMAL
ALBUMIN SERPL-MCNC: 3.6 G/DL (ref 3.5–5.2)
ALBUMIN/GLOB SERPL: 1.2 G/DL
ALP SERPL-CCNC: 116 U/L (ref 39–117)
ALT SERPL W P-5'-P-CCNC: 55 U/L (ref 1–41)
ANION GAP SERPL CALCULATED.3IONS-SCNC: 13.2 MMOL/L (ref 5–15)
AST SERPL-CCNC: 53 U/L (ref 1–40)
BASOPHILS # BLD AUTO: 0 10*3/MM3 (ref 0–0.2)
BASOPHILS NFR BLD AUTO: 0 % (ref 0–1.5)
BILIRUB SERPL-MCNC: 0.3 MG/DL (ref 0–1.2)
BLD GP AB SCN SERPL QL: NEGATIVE
BUN SERPL-MCNC: 57 MG/DL (ref 8–23)
BUN/CREAT SERPL: 13.8 (ref 7–25)
CALCIUM SPEC-SCNC: 8.5 MG/DL (ref 8.6–10.5)
CHLORIDE SERPL-SCNC: 99 MMOL/L (ref 98–107)
CO2 SERPL-SCNC: 22.8 MMOL/L (ref 22–29)
CREAT SERPL-MCNC: 4.13 MG/DL (ref 0.76–1.27)
DEPRECATED RDW RBC AUTO: 45.8 FL (ref 37–54)
EGFRCR SERPLBLD CKD-EPI 2021: 14.9 ML/MIN/1.73
EOSINOPHIL # BLD AUTO: 0.2 10*3/MM3 (ref 0–0.4)
EOSINOPHIL NFR BLD AUTO: 3.1 % (ref 0.3–6.2)
ERYTHROCYTE [DISTWIDTH] IN BLOOD BY AUTOMATED COUNT: 14.8 % (ref 12.3–15.4)
GLOBULIN UR ELPH-MCNC: 3.1 GM/DL
GLUCOSE SERPL-MCNC: 143 MG/DL (ref 65–99)
HCT VFR BLD AUTO: 26.4 % (ref 37.5–51)
HGB BLD-MCNC: 8.3 G/DL (ref 13–17.7)
IMM GRANULOCYTES # BLD AUTO: 0.04 10*3/MM3 (ref 0–0.05)
IMM GRANULOCYTES NFR BLD AUTO: 0.6 % (ref 0–0.5)
LYMPHOCYTES # BLD AUTO: 0.86 10*3/MM3 (ref 0.7–3.1)
LYMPHOCYTES NFR BLD AUTO: 13.4 % (ref 19.6–45.3)
MCH RBC QN AUTO: 26.9 PG (ref 26.6–33)
MCHC RBC AUTO-ENTMCNC: 31.4 G/DL (ref 31.5–35.7)
MCV RBC AUTO: 85.7 FL (ref 79–97)
MONOCYTES # BLD AUTO: 0.69 10*3/MM3 (ref 0.1–0.9)
MONOCYTES NFR BLD AUTO: 10.8 % (ref 5–12)
NEUTROPHILS NFR BLD AUTO: 4.62 10*3/MM3 (ref 1.7–7)
NEUTROPHILS NFR BLD AUTO: 72.1 % (ref 42.7–76)
NRBC BLD AUTO-RTO: 0 /100 WBC (ref 0–0.2)
PLATELET # BLD AUTO: 261 10*3/MM3 (ref 140–450)
PMV BLD AUTO: 10.3 FL (ref 6–12)
POTASSIUM SERPL-SCNC: 3.9 MMOL/L (ref 3.5–5.2)
PROT SERPL-MCNC: 6.7 G/DL (ref 6–8.5)
RBC # BLD AUTO: 3.08 10*6/MM3 (ref 4.14–5.8)
RH BLD: POSITIVE
SODIUM SERPL-SCNC: 135 MMOL/L (ref 136–145)
T&S EXPIRATION DATE: NORMAL
WBC NRBC COR # BLD AUTO: 6.41 10*3/MM3 (ref 3.4–10.8)

## 2025-03-23 PROCEDURE — 85025 COMPLETE CBC W/AUTO DIFF WBC: CPT | Performed by: EMERGENCY MEDICINE

## 2025-03-23 PROCEDURE — 80053 COMPREHEN METABOLIC PANEL: CPT | Performed by: EMERGENCY MEDICINE

## 2025-03-23 PROCEDURE — 86901 BLOOD TYPING SEROLOGIC RH(D): CPT | Performed by: EMERGENCY MEDICINE

## 2025-03-23 PROCEDURE — 86850 RBC ANTIBODY SCREEN: CPT | Performed by: EMERGENCY MEDICINE

## 2025-03-23 PROCEDURE — 99283 EMERGENCY DEPT VISIT LOW MDM: CPT

## 2025-03-23 PROCEDURE — 86900 BLOOD TYPING SEROLOGIC ABO: CPT | Performed by: EMERGENCY MEDICINE

## 2025-03-23 RX ORDER — SODIUM CHLORIDE 0.9 % (FLUSH) 0.9 %
10 SYRINGE (ML) INJECTION AS NEEDED
Status: DISCONTINUED | OUTPATIENT
Start: 2025-03-23 | End: 2025-03-23 | Stop reason: HOSPADM

## 2025-03-23 NOTE — ED PROVIDER NOTES
EMERGENCY DEPARTMENT ENCOUNTER    Room Number:  18/18  PCP: Belle Simons APRN  Independent Historians: Patient    HPI:  Chief Complaint: had concerns including Vascular Access Problem.      A complete HPI/ROS/PMH/PSH/SH/FH are unobtainable due to: Poor historian    Chronic or social conditions impacting patient care (Social Determinants of Health): None      Context: Carlito Mo is a 69 y.o. male with a medical history of end-stage renal disease on dialysis, diabetes, hypothyroidism, hypotension, A-fib on Eliquis who presents to the ED c/o acute right AV fistula bleeding issues.  Patient had dialysis yesterday and had some bleeding after dialysis from his site.  It was easily controlled and then overnight patient had rebleeding.  Again the facility was able to control it.  This afternoon however the site began to bleed again and was difficult to control so patient was sent to the ER.  Patient complains of generalized weakness and that the toilet at his facility is too low making it difficult to get on and get off of the body but has no other specific complaints currently.        Review of prior external notes (non-ED) -and- Review of prior external test results outside of this encounter: I reviewed discharge summary from patient's recent admission.  Patient just discharged on the 20th after being admitted with an ileus, A-fib with RVR, and hypotension issues along with pneumonia.    On further review of records patient seen by Dr. Willis with vascular surgery for right AV fistula procedure in 2022.  I see no other vascular surgery notes since then.    Prescription drug monitoring program review:     N/A    PAST MEDICAL HISTORY  Active Ambulatory Problems     Diagnosis Date Noted    Major depressive disorder with single episode, in partial remission     Other hyperlipidemia     Type 2 diabetes mellitus, with long-term current use of insulin     Vitamin D deficiency 12/17/2015    Erectile dysfunction 12/17/2015     Chronic fatigue 04/25/2016    Type 2 diabetes mellitus with ophthalmic complication 04/25/2016    Benign prostatic hyperplasia with nocturia 12/20/2016    History of basal cell carcinoma 12/20/2016    Acquired hypothyroidism 12/20/2017    Recurrent strokes 02/20/2018    Suspected sleep apnea 10/29/2018    YAW (obstructive sleep apnea) 12/13/2018    Coronary artery disease involving native coronary artery of native heart without angina pectoris 04/18/2019    Chronically elevated troponin 07/02/2019    Colon cancer screening 10/30/2018    Enlarged lymph nodes 10/30/2018    Functional gait abnormality 10/30/2018    History of myocardial infarction 02/06/2019    Insomnia 02/06/2019    Iron deficiency anemia 10/02/2018    Major depression, recurrent 10/16/2012    Essential hypertension 03/10/2020    Acute on chronic renal insufficiency 05/25/2020    Falls frequently 05/25/2020    Acute on chronic anemia 05/25/2020    Neurogenic orthostatic hypotension 05/30/2020    Anemia, chronic disease 05/25/2020    History of colon polyps 05/25/2020    Helicobacter pylori gastritis 06/04/2020    Esophagitis 06/10/2020    Embolic stroke 05/20/2021    Patent foramen ovale 05/22/2021    Cardiomyopathy 05/24/2021    Diarrhea 12/14/2021    Generalized weakness 03/01/2022    Paroxysmal atrial fibrillation 07/25/2022    Chronic anticoagulation 07/25/2022    Acute ischemic stroke 09/17/2022    History of stroke 05/02/2023    Iron deficiency anemia 05/04/2023    Acute HFrEF (heart failure with reduced ejection fraction) 05/05/2023    ESRD (end stage renal disease) 12/01/2023    Hemoptysis 12/01/2023    Chronic HFrEF (heart failure with reduced ejection fraction) 12/11/2023    Hemodialysis patient 04/10/2024    Exertional dyspnea 05/23/2024    Severe malnutrition 05/29/2024    Ataxia 09/03/2024    Cerebellar stroke, acute 09/04/2024    Medically noncompliant 09/04/2024    Vertebral artery stenosis, bilateral 10/02/2024    Carotid stenosis,  right 10/02/2024    Ileus 03/16/2025     Resolved Ambulatory Problems     Diagnosis Date Noted    Anemia     Arthritis     Diabetes mellitus out of control     Acute sinusitis     Accumulation of fluid in tissues 12/17/2015    Fatigue 12/17/2015    Cardiomyopathy, ischemic 12/17/2015    Acute appendicitis 03/02/2016    Skin lesion of chest wall 06/20/2016    Slow transit constipation 09/01/2016    Atherosclerosis of coronary artery 10/16/2012    High blood pressure associated with diabetes 12/20/2016    Altered mental status 11/30/2017    Hypertensive urgency 02/16/2018    Hypertensive encephalopathy syndrome 04/30/2018    Noncompliance w/medication treatment due to intermit use of medication 05/04/2018    Hyperglycemia 05/04/2018    Screening for colorectal cancer 08/22/2018    Constipation 08/22/2018    Urinary retention 09/20/2018    Pneumonia 09/21/2018    Chronic combined systolic and diastolic CHF (congestive heart failure) 09/21/2018    Acute respiratory failure with hypoxia 09/21/2018    Pleural effusion 12/01/2018    Snoring 12/13/2018    Hospital discharge follow-up 05/31/2019    Adverse effect of iron and its compounds, initial encounter 05/23/2020    Debility 05/25/2020    Acute pain of right shoulder 05/27/2020    Lower abdominal pain 05/27/2020    Heme positive stool 05/25/2020    Sleep related hypoxia 07/23/2020    Pleural effusion, bilateral 05/22/2021    Lower abdominal pain 12/13/2021    CKD (chronic kidney disease) stage 4, GFR 15-29 ml/min 12/16/2021    Hypertensive emergency 02/01/2022    Hypertension not at goal 02/02/2022    Memory loss due to medical condition 02/08/2022    ERRONEOUS ENCOUNTER--DISREGARD 03/09/2022    Weakness 06/03/2022    Multiple falls 09/15/2022    Dehydration 09/16/2022    SYLVAIN (acute kidney injury) 09/16/2022    DE LEON (dyspnea on exertion) 03/07/2023    Shortness of breath 03/08/2023    Acute combined systolic and diastolic congestive heart failure 05/02/2023    Atrial  fibrillation with rapid ventricular response 11/29/2023    Nausea & vomiting 12/01/2023    Coffee ground emesis 11/28/2023    Severe malnutrition 12/09/2023    Syncope 01/04/2024    Abnormal CT of the chest 01/04/2024     Past Medical History:   Diagnosis Date    Atrial fibrillation     CAD (coronary artery disease)     Chronic combined systolic and diastolic congestive heart failure     COPD (chronic obstructive pulmonary disease)     Depression     Diabetic neuropathy 04/11/2022    Frequent PVCs     GERD (gastroesophageal reflux disease)     Hyperlipidemia     Hypertension     Hypertensive encephalopathy     Poor historian     RBBB (right bundle branch block)     Stroke     TIA (transient ischemic attack)     Type 2 diabetes mellitus          PAST SURGICAL HISTORY  Past Surgical History:   Procedure Laterality Date    APPENDECTOMY N/A 02/14/2016    Dr. Alexey Dodson    ARTERIOVENOUS FISTULA/SHUNT SURGERY Right 06/03/2022    Procedure: RIGHT FOREARM ARTERIOVENOUS FISTULA PLACEMENT RADIOCEPHALIC WITH CEPHALIC VEIN TRANSPOSITION;  Surgeon: Eliseo Willis MD;  Location: [facility] MAIN OR;  Service: Vascular;  Laterality: Right;    COLONOSCOPY      over 20 years ago.  no polyps     COLONOSCOPY N/A 09/18/2018    Procedure: COLONOSCOPY;  Surgeon: Jose Enrique Louie MD;  Location: [facility] ENDOSCOPY;  Service: Gastroenterology    COLONOSCOPY N/A 09/19/2018    IH, EH, polyps (TAs w/low grade dysplasia)    COLONOSCOPY N/A 06/01/2020    Procedure: COLONOSCOPY;  Surgeon: Jose Enrique Louie MD;  Location: [facility] ENDOSCOPY;  Service: Gastroenterology;  Laterality: N/A;  Pre op-Anemia, Melena, History of Polyps  Post op-To Cecum/TI, Polyp, Poor Prep    CORONARY ARTERY BYPASS GRAFT  11/2001    ENDOSCOPY N/A 05/29/2020    Procedure: ESOPHAGOGASTRODUODENOSCOPY with biopsies;  Surgeon: Amanda Lovelace MD;  Location: [facility] ENDOSCOPY;  Service: Gastroenterology;  Laterality: N/A;  pre-anemia, dark stools  post-esophagitis,  hiatal hernia    ENDOSCOPY N/A 09/15/2020    Procedure: ESOPHAGOGASTRODUODENOSCOPY WITH BIOPSIES;  Surgeon: Jose Enrique Louie MD;  Location:  [facility] ENDOSCOPY;  Service: Gastroenterology;  Laterality: N/A;  pre: history of melena and esophagitis  post: mild esophagitis and gastritis, small hiatal hernia    ENDOSCOPY N/A 2023    Procedure: ESOPHAGOGASTRODUODENOSCOPY with bx;  Surgeon: Quoc Elmore MD;  Location:  [facility] ENDOSCOPY;  Service: Gastroenterology;  Laterality: N/A;  pre: Coffee-ground emesis  post: hiatal hernia, esophagitis    HERNIA REPAIR Left 2019    THORACENTESIS      TONSILLECTOMY      VASECTOMY           FAMILY HISTORY  Family History   Problem Relation Age of Onset    Diabetes Mother     Hypertension Mother     Macular degeneration Mother     Alcohol abuse Father     Cancer Father         lung,  age 65    Heart disease Father     Alcohol abuse Brother     Cirrhosis Brother          age 50    Liver cancer Brother 45    Diabetes Son     Kidney failure Son     Autism Son     Malig Hyperthermia Neg Hx          SOCIAL HISTORY  Social History     Socioeconomic History    Marital status:      Spouse name: Lissette    Number of children: 3    Years of education: college   Tobacco Use    Smoking status: Never    Smokeless tobacco: Never    Tobacco comments:     CAFFEINE - OCCAS. SODA   Vaping Use    Vaping status: Never Used   Substance and Sexual Activity    Alcohol use: No     Comment: last drink 2 months ago    Drug use: No    Sexual activity: Defer         ALLERGIES  Prozac [fluoxetine hcl]        REVIEW OF SYSTEMS  Review of Systems  Included in HPI  All systems reviewed and negative except for those discussed in HPI.      PHYSICAL EXAM    I have reviewed the triage vital signs and nursing notes.    ED Triage Vitals [25 1823]   Temp Heart Rate Resp BP SpO2   97.1 °F (36.2 °C) 64 20 131/63 97 %      Temp src Heart Rate Source Patient Position BP Location  FiO2 (%)   Tympanic Monitor Sitting Left arm --       Physical Exam  GENERAL: Pleasant cooperative but disheveled thin male, alert, no acute distress  SKIN: Warm, dry, generalized pallor  HENT: Normocephalic, atraumatic  RESPIRATORY: Relaxed breathing  MUSCULOSKELETAL: no deformity, right forearm with gauze applied over AV fistula with some fresh blood but no drainage around gauze, palpable thrill, 2+ radial pulse distally  NEURO: alert, follows commands                                                                   LAB RESULTS  Recent Results (from the past 24 hours)   Comprehensive Metabolic Panel    Collection Time: 03/23/25  7:02 PM    Specimen: Blood   Result Value Ref Range    Glucose 143 (H) 65 - 99 mg/dL    BUN 57 (H) 8 - 23 mg/dL    Creatinine 4.13 (H) 0.76 - 1.27 mg/dL    Sodium 135 (L) 136 - 145 mmol/L    Potassium 3.9 3.5 - 5.2 mmol/L    Chloride 99 98 - 107 mmol/L    CO2 22.8 22.0 - 29.0 mmol/L    Calcium 8.5 (L) 8.6 - 10.5 mg/dL    Total Protein 6.7 6.0 - 8.5 g/dL    Albumin 3.6 3.5 - 5.2 g/dL    ALT (SGPT) 55 (H) 1 - 41 U/L    AST (SGOT) 53 (H) 1 - 40 U/L    Alkaline Phosphatase 116 39 - 117 U/L    Total Bilirubin 0.3 0.0 - 1.2 mg/dL    Globulin 3.1 gm/dL    A/G Ratio 1.2 g/dL    BUN/Creatinine Ratio 13.8 7.0 - 25.0    Anion Gap 13.2 5.0 - 15.0 mmol/L    eGFR 14.9 (L) >60.0 mL/min/1.73   Type & Screen    Collection Time: 03/23/25  7:02 PM    Specimen: Blood   Result Value Ref Range    ABO Type B     RH type Positive     Antibody Screen Negative     T&S Expiration Date 3/26/2025 11:59:59 PM    CBC Auto Differential    Collection Time: 03/23/25  7:02 PM    Specimen: Blood   Result Value Ref Range    WBC 6.41 3.40 - 10.80 10*3/mm3    RBC 3.08 (L) 4.14 - 5.80 10*6/mm3    Hemoglobin 8.3 (L) 13.0 - 17.7 g/dL    Hematocrit 26.4 (L) 37.5 - 51.0 %    MCV 85.7 79.0 - 97.0 fL    MCH 26.9 26.6 - 33.0 pg    MCHC 31.4 (L) 31.5 - 35.7 g/dL    RDW 14.8 12.3 - 15.4 %    RDW-SD 45.8 37.0 - 54.0 fl    MPV 10.3 6.0 -  12.0 fL    Platelets 261 140 - 450 10*3/mm3    Neutrophil % 72.1 42.7 - 76.0 %    Lymphocyte % 13.4 (L) 19.6 - 45.3 %    Monocyte % 10.8 5.0 - 12.0 %    Eosinophil % 3.1 0.3 - 6.2 %    Basophil % 0.0 0.0 - 1.5 %    Immature Grans % 0.6 (H) 0.0 - 0.5 %    Neutrophils, Absolute 4.62 1.70 - 7.00 10*3/mm3    Lymphocytes, Absolute 0.86 0.70 - 3.10 10*3/mm3    Monocytes, Absolute 0.69 0.10 - 0.90 10*3/mm3    Eosinophils, Absolute 0.20 0.00 - 0.40 10*3/mm3    Basophils, Absolute 0.00 0.00 - 0.20 10*3/mm3    Immature Grans, Absolute 0.04 0.00 - 0.05 10*3/mm3    nRBC 0.0 0.0 - 0.2 /100 WBC         RADIOLOGY  No Radiology Exams Resulted Within Past 24 Hours      MEDICATIONS GIVEN IN ER  Medications   sodium chloride 0.9 % flush 10 mL (has no administration in time range)         ORDERS PLACED DURING THIS VISIT:  Orders Placed This Encounter   Procedures    Comprehensive Metabolic Panel    CBC Auto Differential    Type & Screen    Insert Peripheral IV    CBC & Differential         OUTPATIENT MEDICATION MANAGEMENT:  Current Facility-Administered Medications Ordered in Epic   Medication Dose Route Frequency Provider Last Rate Last Admin    sodium chloride 0.9 % flush 10 mL  10 mL Intravenous PRN Wilner Gillis MD         Current Outpatient Medications Ordered in Epic   Medication Sig Dispense Refill    amiodarone (PACERONE) 200 MG tablet Take 1 tablet by mouth Daily for 30 days. 30 tablet 0    amoxicillin (AMOXIL) 500 MG capsule Take 1 capsule by mouth Every 12 (Twelve) Hours for 5 doses. Indications: Pneumonia 5 capsule 0    aspirin 81 MG EC tablet Take 1 tablet by mouth Daily.      atorvastatin (LIPITOR) 80 MG tablet Take 1 tablet by mouth Every Night. 90 tablet 0    Eliquis 2.5 MG tablet tablet TAKE ONE TABLET BY MOUTH TWICE DAILY 60 tablet 11    insulin lispro (HUMALOG/ADMELOG) 100 UNIT/ML injection Inject 2-7 Units under the skin into the appropriate area as directed 4 (Four) Times a Day Before Meals & at Bedtime.       levothyroxine (SYNTHROID, LEVOTHROID) 75 MCG tablet Take 1 tablet by mouth Every Morning. 30 tablet 0    midodrine (PROAMATINE) 5 MG tablet Take 1 tablet by mouth 3 (Three) Times a Day Before Meals. 90 tablet 0    pantoprazole (PROTONIX) 40 MG EC tablet Take 1 tablet by mouth 2 (Two) Times a Day Before Meals. 30 tablet 0    tamsulosin (FLOMAX) 0.4 MG capsule 24 hr capsule Take 1 capsule by mouth Daily. 30 capsule 0         PROCEDURES  Procedures            PROGRESS, DATA ANALYSIS, CONSULTS, AND MEDICAL DECISION MAKING  All labs have been independently interpreted by me.  All radiology studies have been reviewed by me. All EKG's have been independently viewed and interpreted by me.  Discussion below represents my analysis of pertinent findings related to patient's condition, differential diagnosis, treatment plan and final disposition.    Differential diagnosis includes but is not limited to thrombocytopenia, profound anemia, issue with AV fistula.  Bleeding seems to be controlled at the moment but will continue to monitor.  Plan to assess degree of blood loss given that this is the third time at least that it has bled since dialysis yesterday.  Patient with anemia of chronic disease with last hemoglobin in system from March 20 of 8.3.          ED Course as of 03/23/25 2135   Sun Mar 23, 2025   1840 First look:  Patient presents with complaint of bleeding from his right upper extremity dialysis fistula.  He underwent dialysis yesterday reportedly had a little bit of bleeding afterwards that resolved.  It also reportedly led this morning but they were able to control it at the nursing facility.  This evening he began bleeding again and they applied a pressure dressing without improvement so they called EMS.    Patient is on Eliquis 2.5 mg twice daily.    The right upper extremity/right forearm fistula has a pressure dressing in place with bloodsoaked gauze but no evidence of active bleeding with the gauze in place.   There is a palpable thrill present.  I discussed plan to obtain labs and vascular surgery consultation.  Care will be resumed by the oncoming provider. [JR]   1946 Wound hemostatic and loosely dressed to assess for any rebleeding. Hemoglobin stable.  Plan discharge back to his facility. [AR]      ED Course User Index  [AR] Lady Jacobs MD  [JR] Wilner Gillis MD             AS OF 21:35 EDT VITALS:    BP - 142/66  HR - 72  TEMP - 97.1 °F (36.2 °C) (Tympanic)  O2 SATS - 95%      COMPLEXITY OF CARE  Admission was considered but after careful review of the patient's presentation, physical examination, diagnostic results, and response to treatment the patient may be safely discharged with outpatient follow-up.    DIAGNOSIS  Final diagnoses:   Bleeding from dialysis shunt, initial encounter         DISPOSITION  ED Disposition       ED Disposition   Discharge    Condition   Stable    Comment   --                Please note that portions of this document were completed with a voice recognition program.    Note Disclaimer: At [facility], we believe that sharing information builds trust and better relationships. You are receiving this note because you recently visited [facility]. It is possible you will see health information before a provider has talked with you about it. This kind of information can be easy to misunderstand. To help you fully understand what it means for your health, we urge you to discuss this note with your provider.         Lady Jacobs MD  03/23/25 0312

## 2025-03-23 NOTE — DISCHARGE INSTRUCTIONS
Monitor for any signs of recurrent symptoms or worsening and see your primary doctor to discuss your ER visit while returning to the ER if any concerns as we discussed.

## 2025-04-12 ENCOUNTER — APPOINTMENT (OUTPATIENT)
Dept: CT IMAGING | Facility: HOSPITAL | Age: 70
End: 2025-04-12
Payer: MEDICARE

## 2025-04-12 ENCOUNTER — HOSPITAL ENCOUNTER (OUTPATIENT)
Facility: HOSPITAL | Age: 70
Setting detail: OBSERVATION
Discharge: NURSING, REHAB OR LONG-TERM CARE | End: 2025-04-19
Attending: EMERGENCY MEDICINE | Admitting: INTERNAL MEDICINE
Payer: MEDICARE

## 2025-04-12 DIAGNOSIS — R29.6 FREQUENT FALLS: Primary | ICD-10-CM

## 2025-04-12 DIAGNOSIS — E83.39 HYPOPHOSPHATEMIA: ICD-10-CM

## 2025-04-12 DIAGNOSIS — Z79.01 CHRONIC ANTICOAGULATION: ICD-10-CM

## 2025-04-12 DIAGNOSIS — R53.81 MULTIFACTORIAL FUNCTIONAL IMPAIRMENT: ICD-10-CM

## 2025-04-12 DIAGNOSIS — E87.6 HYPOKALEMIA: ICD-10-CM

## 2025-04-12 DIAGNOSIS — S09.90XA CLOSED HEAD INJURY, INITIAL ENCOUNTER: ICD-10-CM

## 2025-04-12 LAB
ALBUMIN SERPL-MCNC: 3.5 G/DL (ref 3.5–5.2)
ALBUMIN/GLOB SERPL: 1.1 G/DL
ALP SERPL-CCNC: 143 U/L (ref 39–117)
ALT SERPL W P-5'-P-CCNC: 22 U/L (ref 1–41)
ANION GAP SERPL CALCULATED.3IONS-SCNC: 10.6 MMOL/L (ref 5–15)
AST SERPL-CCNC: 21 U/L (ref 1–40)
BASOPHILS # BLD AUTO: 0 10*3/MM3 (ref 0–0.2)
BASOPHILS NFR BLD AUTO: 0 % (ref 0–1.5)
BILIRUB SERPL-MCNC: 0.3 MG/DL (ref 0–1.2)
BUN SERPL-MCNC: 24 MG/DL (ref 8–23)
BUN/CREAT SERPL: 8.4 (ref 7–25)
CALCIUM SPEC-SCNC: 8.7 MG/DL (ref 8.6–10.5)
CHLORIDE SERPL-SCNC: 103 MMOL/L (ref 98–107)
CO2 SERPL-SCNC: 24.4 MMOL/L (ref 22–29)
CREAT SERPL-MCNC: 2.86 MG/DL (ref 0.76–1.27)
DEPRECATED RDW RBC AUTO: 45.9 FL (ref 37–54)
EGFRCR SERPLBLD CKD-EPI 2021: 23.1 ML/MIN/1.73
EOSINOPHIL # BLD AUTO: 0.33 10*3/MM3 (ref 0–0.4)
EOSINOPHIL NFR BLD AUTO: 7.1 % (ref 0.3–6.2)
ERYTHROCYTE [DISTWIDTH] IN BLOOD BY AUTOMATED COUNT: 14.5 % (ref 12.3–15.4)
GLOBULIN UR ELPH-MCNC: 3.3 GM/DL
GLUCOSE BLDC GLUCOMTR-MCNC: 141 MG/DL (ref 70–130)
GLUCOSE BLDC GLUCOMTR-MCNC: 149 MG/DL (ref 70–130)
GLUCOSE BLDC GLUCOMTR-MCNC: 159 MG/DL (ref 70–130)
GLUCOSE BLDC GLUCOMTR-MCNC: 229 MG/DL (ref 70–130)
GLUCOSE BLDC GLUCOMTR-MCNC: 53 MG/DL (ref 70–130)
GLUCOSE SERPL-MCNC: 227 MG/DL (ref 65–99)
HCT VFR BLD AUTO: 27.5 % (ref 37.5–51)
HGB BLD-MCNC: 8.4 G/DL (ref 13–17.7)
IMM GRANULOCYTES # BLD AUTO: 0.03 10*3/MM3 (ref 0–0.05)
IMM GRANULOCYTES NFR BLD AUTO: 0.6 % (ref 0–0.5)
LYMPHOCYTES # BLD AUTO: 0.54 10*3/MM3 (ref 0.7–3.1)
LYMPHOCYTES NFR BLD AUTO: 11.6 % (ref 19.6–45.3)
MAGNESIUM SERPL-MCNC: 1.7 MG/DL (ref 1.6–2.4)
MCH RBC QN AUTO: 27.3 PG (ref 26.6–33)
MCHC RBC AUTO-ENTMCNC: 30.5 G/DL (ref 31.5–35.7)
MCV RBC AUTO: 89.3 FL (ref 79–97)
MONOCYTES # BLD AUTO: 0.64 10*3/MM3 (ref 0.1–0.9)
MONOCYTES NFR BLD AUTO: 13.8 % (ref 5–12)
NEUTROPHILS NFR BLD AUTO: 3.1 10*3/MM3 (ref 1.7–7)
NEUTROPHILS NFR BLD AUTO: 66.9 % (ref 42.7–76)
NRBC BLD AUTO-RTO: 0 /100 WBC (ref 0–0.2)
PHOSPHATE SERPL-MCNC: 1.7 MG/DL (ref 2.5–4.5)
PLATELET # BLD AUTO: 258 10*3/MM3 (ref 140–450)
PMV BLD AUTO: 9.6 FL (ref 6–12)
POTASSIUM SERPL-SCNC: 3.4 MMOL/L (ref 3.5–5.2)
PROT SERPL-MCNC: 6.8 G/DL (ref 6–8.5)
RBC # BLD AUTO: 3.08 10*6/MM3 (ref 4.14–5.8)
SODIUM SERPL-SCNC: 138 MMOL/L (ref 136–145)
URATE SERPL-MCNC: 2.8 MG/DL (ref 3.4–7)
WBC NRBC COR # BLD AUTO: 4.64 10*3/MM3 (ref 3.4–10.8)
WHOLE BLOOD HOLD COAG: NORMAL

## 2025-04-12 PROCEDURE — G0378 HOSPITAL OBSERVATION PER HR: HCPCS

## 2025-04-12 PROCEDURE — 83735 ASSAY OF MAGNESIUM: CPT | Performed by: EMERGENCY MEDICINE

## 2025-04-12 PROCEDURE — 82948 REAGENT STRIP/BLOOD GLUCOSE: CPT

## 2025-04-12 PROCEDURE — 97166 OT EVAL MOD COMPLEX 45 MIN: CPT

## 2025-04-12 PROCEDURE — 70450 CT HEAD/BRAIN W/O DYE: CPT

## 2025-04-12 PROCEDURE — 80053 COMPREHEN METABOLIC PANEL: CPT | Performed by: EMERGENCY MEDICINE

## 2025-04-12 PROCEDURE — 36415 COLL VENOUS BLD VENIPUNCTURE: CPT

## 2025-04-12 PROCEDURE — 93010 ELECTROCARDIOGRAM REPORT: CPT | Performed by: INTERNAL MEDICINE

## 2025-04-12 PROCEDURE — 63710000001 INSULIN LISPRO (HUMAN) PER 5 UNITS: Performed by: NURSE PRACTITIONER

## 2025-04-12 PROCEDURE — 96374 THER/PROPH/DIAG INJ IV PUSH: CPT

## 2025-04-12 PROCEDURE — 97530 THERAPEUTIC ACTIVITIES: CPT

## 2025-04-12 PROCEDURE — 84550 ASSAY OF BLOOD/URIC ACID: CPT

## 2025-04-12 PROCEDURE — 72125 CT NECK SPINE W/O DYE: CPT

## 2025-04-12 PROCEDURE — 99285 EMERGENCY DEPT VISIT HI MDM: CPT

## 2025-04-12 PROCEDURE — 97535 SELF CARE MNGMENT TRAINING: CPT

## 2025-04-12 PROCEDURE — 84100 ASSAY OF PHOSPHORUS: CPT | Performed by: EMERGENCY MEDICINE

## 2025-04-12 PROCEDURE — 97162 PT EVAL MOD COMPLEX 30 MIN: CPT

## 2025-04-12 PROCEDURE — 93005 ELECTROCARDIOGRAM TRACING: CPT | Performed by: EMERGENCY MEDICINE

## 2025-04-12 PROCEDURE — 85025 COMPLETE CBC W/AUTO DIFF WBC: CPT | Performed by: EMERGENCY MEDICINE

## 2025-04-12 RX ORDER — AMIODARONE HYDROCHLORIDE 200 MG/1
200 TABLET ORAL
Status: DISCONTINUED | OUTPATIENT
Start: 2025-04-12 | End: 2025-04-19 | Stop reason: HOSPADM

## 2025-04-12 RX ORDER — TAMSULOSIN HYDROCHLORIDE 0.4 MG/1
0.4 CAPSULE ORAL DAILY
Status: DISCONTINUED | OUTPATIENT
Start: 2025-04-12 | End: 2025-04-19 | Stop reason: HOSPADM

## 2025-04-12 RX ORDER — SODIUM CHLORIDE 0.9 % (FLUSH) 0.9 %
10 SYRINGE (ML) INJECTION EVERY 12 HOURS SCHEDULED
Status: DISCONTINUED | OUTPATIENT
Start: 2025-04-12 | End: 2025-04-19 | Stop reason: HOSPADM

## 2025-04-12 RX ORDER — PANTOPRAZOLE SODIUM 40 MG/1
40 TABLET, DELAYED RELEASE ORAL
Status: DISCONTINUED | OUTPATIENT
Start: 2025-04-12 | End: 2025-04-19 | Stop reason: HOSPADM

## 2025-04-12 RX ORDER — ACETAMINOPHEN 325 MG/1
650 TABLET ORAL EVERY 4 HOURS PRN
Status: DISCONTINUED | OUTPATIENT
Start: 2025-04-12 | End: 2025-04-19 | Stop reason: HOSPADM

## 2025-04-12 RX ORDER — ACETAMINOPHEN 650 MG/1
650 SUPPOSITORY RECTAL EVERY 4 HOURS PRN
Status: DISCONTINUED | OUTPATIENT
Start: 2025-04-12 | End: 2025-04-19 | Stop reason: HOSPADM

## 2025-04-12 RX ORDER — ACETAMINOPHEN 160 MG/5ML
650 SOLUTION ORAL EVERY 4 HOURS PRN
Status: DISCONTINUED | OUTPATIENT
Start: 2025-04-12 | End: 2025-04-19 | Stop reason: HOSPADM

## 2025-04-12 RX ORDER — MIDODRINE HYDROCHLORIDE 5 MG/1
5 TABLET ORAL
Status: DISCONTINUED | OUTPATIENT
Start: 2025-04-12 | End: 2025-04-17

## 2025-04-12 RX ORDER — CALCIUM CARBONATE 500 MG/1
2 TABLET, CHEWABLE ORAL 2 TIMES DAILY PRN
Status: DISCONTINUED | OUTPATIENT
Start: 2025-04-12 | End: 2025-04-19 | Stop reason: HOSPADM

## 2025-04-12 RX ORDER — SODIUM CHLORIDE 0.9 % (FLUSH) 0.9 %
10 SYRINGE (ML) INJECTION AS NEEDED
Status: DISCONTINUED | OUTPATIENT
Start: 2025-04-12 | End: 2025-04-19 | Stop reason: HOSPADM

## 2025-04-12 RX ORDER — DEXTROSE MONOHYDRATE 25 G/50ML
25 INJECTION, SOLUTION INTRAVENOUS
Status: DISCONTINUED | OUTPATIENT
Start: 2025-04-12 | End: 2025-04-19 | Stop reason: HOSPADM

## 2025-04-12 RX ORDER — NICOTINE POLACRILEX 4 MG
15 LOZENGE BUCCAL
Status: DISCONTINUED | OUTPATIENT
Start: 2025-04-12 | End: 2025-04-19 | Stop reason: HOSPADM

## 2025-04-12 RX ORDER — ONDANSETRON 4 MG/1
4 TABLET, ORALLY DISINTEGRATING ORAL EVERY 6 HOURS PRN
Status: DISCONTINUED | OUTPATIENT
Start: 2025-04-12 | End: 2025-04-19 | Stop reason: HOSPADM

## 2025-04-12 RX ORDER — ATORVASTATIN CALCIUM 80 MG/1
80 TABLET, FILM COATED ORAL NIGHTLY
Status: DISCONTINUED | OUTPATIENT
Start: 2025-04-12 | End: 2025-04-19 | Stop reason: HOSPADM

## 2025-04-12 RX ORDER — ONDANSETRON 2 MG/ML
4 INJECTION INTRAMUSCULAR; INTRAVENOUS EVERY 6 HOURS PRN
Status: DISCONTINUED | OUTPATIENT
Start: 2025-04-12 | End: 2025-04-19 | Stop reason: HOSPADM

## 2025-04-12 RX ORDER — INSULIN LISPRO 100 [IU]/ML
2-9 INJECTION, SOLUTION INTRAVENOUS; SUBCUTANEOUS
Status: DISCONTINUED | OUTPATIENT
Start: 2025-04-12 | End: 2025-04-19 | Stop reason: HOSPADM

## 2025-04-12 RX ORDER — LEVOTHYROXINE SODIUM 75 UG/1
75 TABLET ORAL
Status: DISCONTINUED | OUTPATIENT
Start: 2025-04-12 | End: 2025-04-19 | Stop reason: HOSPADM

## 2025-04-12 RX ORDER — CHOLECALCIFEROL (VITAMIN D3) 25 MCG
5000 TABLET ORAL DAILY
Status: DISCONTINUED | OUTPATIENT
Start: 2025-04-12 | End: 2025-04-19 | Stop reason: HOSPADM

## 2025-04-12 RX ORDER — IBUPROFEN 600 MG/1
1 TABLET ORAL
Status: DISCONTINUED | OUTPATIENT
Start: 2025-04-12 | End: 2025-04-19 | Stop reason: HOSPADM

## 2025-04-12 RX ORDER — SODIUM CHLORIDE 9 MG/ML
40 INJECTION, SOLUTION INTRAVENOUS AS NEEDED
Status: DISCONTINUED | OUTPATIENT
Start: 2025-04-12 | End: 2025-04-19 | Stop reason: HOSPADM

## 2025-04-12 RX ORDER — ASPIRIN 81 MG/1
81 TABLET ORAL DAILY
Status: DISCONTINUED | OUTPATIENT
Start: 2025-04-12 | End: 2025-04-19 | Stop reason: HOSPADM

## 2025-04-12 RX ADMIN — ATORVASTATIN CALCIUM 80 MG: 80 TABLET, FILM COATED ORAL at 21:40

## 2025-04-12 RX ADMIN — Medication 10 ML: at 08:49

## 2025-04-12 RX ADMIN — INSULIN LISPRO 4 UNITS: 100 INJECTION, SOLUTION INTRAVENOUS; SUBCUTANEOUS at 08:46

## 2025-04-12 RX ADMIN — MIDODRINE HYDROCHLORIDE 5 MG: 5 TABLET ORAL at 18:49

## 2025-04-12 RX ADMIN — LEVOTHYROXINE SODIUM 75 MCG: 0.07 TABLET ORAL at 08:47

## 2025-04-12 RX ADMIN — MIDODRINE HYDROCHLORIDE 5 MG: 5 TABLET ORAL at 13:09

## 2025-04-12 RX ADMIN — TAMSULOSIN HYDROCHLORIDE 0.4 MG: 0.4 CAPSULE ORAL at 08:47

## 2025-04-12 RX ADMIN — DIBASIC SODIUM PHOSPHATE, MONOBASIC POTASSIUM PHOSPHATE AND MONOBASIC SODIUM PHOSPHATE 2 TABLET: 852; 155; 130 TABLET ORAL at 03:08

## 2025-04-12 RX ADMIN — Medication 5000 UNITS: at 08:47

## 2025-04-12 RX ADMIN — MIDODRINE HYDROCHLORIDE 5 MG: 5 TABLET ORAL at 08:48

## 2025-04-12 RX ADMIN — AMIODARONE HYDROCHLORIDE 200 MG: 200 TABLET ORAL at 08:46

## 2025-04-12 RX ADMIN — PANTOPRAZOLE SODIUM 40 MG: 40 TABLET, DELAYED RELEASE ORAL at 08:47

## 2025-04-12 RX ADMIN — DEXTROSE MONOHYDRATE 25 G: 25 INJECTION, SOLUTION INTRAVENOUS at 11:32

## 2025-04-12 RX ADMIN — PANTOPRAZOLE SODIUM 40 MG: 40 TABLET, DELAYED RELEASE ORAL at 18:49

## 2025-04-12 RX ADMIN — ASPIRIN 81 MG: 81 TABLET, COATED ORAL at 08:47

## 2025-04-12 RX ADMIN — APIXABAN 2.5 MG: 2.5 TABLET, FILM COATED ORAL at 21:40

## 2025-04-12 RX ADMIN — ACETAMINOPHEN 650 MG: 325 TABLET, FILM COATED ORAL at 08:59

## 2025-04-12 RX ADMIN — APIXABAN 2.5 MG: 2.5 TABLET, FILM COATED ORAL at 08:47

## 2025-04-12 NOTE — SIGNIFICANT NOTE
04/12/25 1158   OTHER   Discipline physical therapist   Rehab Time/Intention   Session Not Performed other (see comments)  (Nursing reports that pt's blood sugar is running too high at this time and recommended to return later in PM to attempt PT evaluation. Will check back in PM as time allows.)   Therapy Assessment/Plan (PT)   Criteria for Skilled Interventions Met (PT) yes

## 2025-04-12 NOTE — ED PROVIDER NOTES
EMERGENCY DEPARTMENT ENCOUNTER    History  Chief Complaint   Patient presents with    Fall       History provided by: Patient and EMS     HPI:  Context: Carlito Mo is a 69 y.o. male with a medical history of hyperlipidemia, hypertension, diabetes, coronary artery disease, atrial fibrillation, COPD who presents to the ED c/o acute fall.  Patient was just discharged from acute rehab.  He was in the bathroom and feels like he slipped because of the ill fitting socks.  He is unsure if he hit his head.  He overall feels generally weak and tired.  Complaints of a headache, but no neck pain, chest wall pain, abdominal pain or hip pain.  He did not have any associated chest pain, shortness of breath, lightheadedness, vertigo or other symptoms related to this fall.      Past Medical History:  Active Ambulatory Problems     Diagnosis Date Noted    Major depressive disorder with single episode, in partial remission     Other hyperlipidemia     Type 2 diabetes mellitus, with long-term current use of insulin     Vitamin D deficiency 12/17/2015    Erectile dysfunction 12/17/2015    Chronic fatigue 04/25/2016    Type 2 diabetes mellitus with ophthalmic complication 04/25/2016    Benign prostatic hyperplasia with nocturia 12/20/2016    History of basal cell carcinoma 12/20/2016    Acquired hypothyroidism 12/20/2017    Recurrent strokes 02/20/2018    Suspected sleep apnea 10/29/2018    YAW (obstructive sleep apnea) 12/13/2018    Coronary artery disease involving native coronary artery of native heart without angina pectoris 04/18/2019    Chronically elevated troponin 07/02/2019    Colon cancer screening 10/30/2018    Enlarged lymph nodes 10/30/2018    Functional gait abnormality 10/30/2018    History of myocardial infarction 02/06/2019    Insomnia 02/06/2019    Iron deficiency anemia 10/02/2018    Major depression, recurrent 10/16/2012    Essential hypertension 03/10/2020    Acute on chronic renal insufficiency 05/25/2020    Falls  frequently 05/25/2020    Acute on chronic anemia 05/25/2020    Neurogenic orthostatic hypotension 05/30/2020    Anemia, chronic disease 05/25/2020    History of colon polyps 05/25/2020    Helicobacter pylori gastritis 06/04/2020    Esophagitis 06/10/2020    Embolic stroke 05/20/2021    Patent foramen ovale 05/22/2021    Cardiomyopathy 05/24/2021    Diarrhea 12/14/2021    Generalized weakness 03/01/2022    Paroxysmal atrial fibrillation 07/25/2022    Chronic anticoagulation 07/25/2022    Acute ischemic stroke 09/17/2022    History of stroke 05/02/2023    Iron deficiency anemia 05/04/2023    Acute HFrEF (heart failure with reduced ejection fraction) 05/05/2023    ESRD (end stage renal disease) 12/01/2023    Hemoptysis 12/01/2023    Chronic HFrEF (heart failure with reduced ejection fraction) 12/11/2023    Hemodialysis patient 04/10/2024    Exertional dyspnea 05/23/2024    Severe malnutrition 05/29/2024    Ataxia 09/03/2024    Cerebellar stroke, acute 09/04/2024    Medically noncompliant 09/04/2024    Vertebral artery stenosis, bilateral 10/02/2024    Carotid stenosis, right 10/02/2024    Ileus 03/16/2025     Resolved Ambulatory Problems     Diagnosis Date Noted    Anemia     Arthritis     Diabetes mellitus out of control     Acute sinusitis     Accumulation of fluid in tissues 12/17/2015    Fatigue 12/17/2015    Cardiomyopathy, ischemic 12/17/2015    Acute appendicitis 03/02/2016    Skin lesion of chest wall 06/20/2016    Slow transit constipation 09/01/2016    Atherosclerosis of coronary artery 10/16/2012    High blood pressure associated with diabetes 12/20/2016    Altered mental status 11/30/2017    Hypertensive urgency 02/16/2018    Hypertensive encephalopathy syndrome 04/30/2018    Noncompliance w/medication treatment due to intermit use of medication 05/04/2018    Hyperglycemia 05/04/2018    Screening for colorectal cancer 08/22/2018    Constipation 08/22/2018    Urinary retention 09/20/2018    Pneumonia  09/21/2018    Chronic combined systolic and diastolic CHF (congestive heart failure) 09/21/2018    Acute respiratory failure with hypoxia 09/21/2018    Pleural effusion 12/01/2018    Snoring 12/13/2018    Hospital discharge follow-up 05/31/2019    Adverse effect of iron and its compounds, initial encounter 05/23/2020    Debility 05/25/2020    Acute pain of right shoulder 05/27/2020    Lower abdominal pain 05/27/2020    Heme positive stool 05/25/2020    Sleep related hypoxia 07/23/2020    Pleural effusion, bilateral 05/22/2021    Lower abdominal pain 12/13/2021    CKD (chronic kidney disease) stage 4, GFR 15-29 ml/min 12/16/2021    Hypertensive emergency 02/01/2022    Hypertension not at goal 02/02/2022    Memory loss due to medical condition 02/08/2022    ERRONEOUS ENCOUNTER--DISREGARD 03/09/2022    Weakness 06/03/2022    Multiple falls 09/15/2022    Dehydration 09/16/2022    SYLVAIN (acute kidney injury) 09/16/2022    DE LEON (dyspnea on exertion) 03/07/2023    Shortness of breath 03/08/2023    Acute combined systolic and diastolic congestive heart failure 05/02/2023    Atrial fibrillation with rapid ventricular response 11/29/2023    Nausea & vomiting 12/01/2023    Coffee ground emesis 11/28/2023    Severe malnutrition 12/09/2023    Syncope 01/04/2024    Abnormal CT of the chest 01/04/2024     Past Medical History:   Diagnosis Date    Atrial fibrillation     CAD (coronary artery disease)     Chronic combined systolic and diastolic congestive heart failure     COPD (chronic obstructive pulmonary disease)     Depression     Diabetic neuropathy 04/11/2022    Frequent PVCs     GERD (gastroesophageal reflux disease)     Hyperlipidemia     Hypertension     Hypertensive encephalopathy     Poor historian     RBBB (right bundle branch block)     Stroke     TIA (transient ischemic attack)     Type 2 diabetes mellitus        Past Surgical History:  Past Surgical History:   Procedure Laterality Date    APPENDECTOMY N/A 02/14/2016     Dr. Alexey Dodson    ARTERIOVENOUS FISTULA/SHUNT SURGERY Right 2022    Procedure: RIGHT FOREARM ARTERIOVENOUS FISTULA PLACEMENT RADIOCEPHALIC WITH CEPHALIC VEIN TRANSPOSITION;  Surgeon: Eliseo Willis MD;  Location: [facility] MAIN OR;  Service: Vascular;  Laterality: Right;    COLONOSCOPY      over 20 years ago.  no polyps     COLONOSCOPY N/A 2018    Procedure: COLONOSCOPY;  Surgeon: Jose Enrique Louie MD;  [facility];  Service: Gastroenterology    COLONOSCOPY N/A 2018    IH, EH, polyps (TAs w/low grade dysplasia)    COLONOSCOPY N/A 2020    Procedure: COLONOSCOPY;  Surgeon: Jose Enrique Louie MD;  Location: [facility] ENDOSCOPY;  Service: Gastroenterology;  Laterality: N/A;  Pre op-Anemia, Melena, History of Polyps  Post op-To Cecum/TI, Polyp, Poor Prep    CORONARY ARTERY BYPASS GRAFT  2001    ENDOSCOPY N/A 2020    Procedure: ESOPHAGOGASTRODUODENOSCOPY with biopsies;  Surgeon: Amanda Lovelace MD;  Location: [facility] ENDOSCOPY;  Service: Gastroenterology;  Laterality: N/A;  pre-anemia, dark stools  post-esophagitis, hiatal hernia    ENDOSCOPY N/A 09/15/2020    Procedure: ESOPHAGOGASTRODUODENOSCOPY WITH BIOPSIES;  Surgeon: Jose Enrique Louie MD;  Location: [facility] ENDOSCOPY;  Service: Gastroenterology;  Laterality: N/A;  pre: history of melena and esophagitis  post: mild esophagitis and gastritis, small hiatal hernia    ENDOSCOPY N/A 2023    Procedure: ESOPHAGOGASTRODUODENOSCOPY with bx;  Surgeon: Quoc Elmore MD;  Location: [facility] ENDOSCOPY;  Service: Gastroenterology;  Laterality: N/A;  pre: Coffee-ground emesis  post: hiatal hernia, esophagitis    HERNIA REPAIR Left 2019    THORACENTESIS      TONSILLECTOMY      VASECTOMY           Family History:  Family History   Problem Relation Age of Onset    Diabetes Mother     Hypertension Mother     Macular degeneration Mother     Alcohol abuse Father     Cancer Father         lung,  age 65     Heart disease Father     Alcohol abuse Brother     Cirrhosis Brother          age 50    Liver cancer Brother 45    Diabetes Son     Kidney failure Son     Autism Son     Malig Hyperthermia Neg Hx          Social History:  Social History     Socioeconomic History    Marital status:      Spouse name: Lissette    Number of children: 3    Years of education: college   Tobacco Use    Smoking status: Never    Smokeless tobacco: Never    Tobacco comments:     CAFFEINE - OCCAS. SODA   Vaping Use    Vaping status: Never Used   Substance and Sexual Activity    Alcohol use: No     Comment: last drink 2 months ago    Drug use: No    Sexual activity: Defer         Allergies:  Prozac [fluoxetine hcl]        Physical Exam  ED Triage Vitals [25 0028]   Temp Heart Rate Resp BP SpO2   98 °F (36.7 °C) 62 16 111/53 97 %      Temp src Heart Rate Source Patient Position BP Location FiO2 (%)   Tympanic -- -- -- --     Physical Exam  Constitutional:       Appearance: He is not ill-appearing.      Comments: Frail, Elderly   HENT:      Head: Normocephalic and atraumatic.   Eyes:      Conjunctiva/sclera: Conjunctivae normal.   Cardiovascular:      Rate and Rhythm: Normal rate and regular rhythm.      Heart sounds: Murmur heard.   Pulmonary:      Effort: Pulmonary effort is normal. No respiratory distress.   Chest:      Chest wall: No tenderness.   Abdominal:      Tenderness: There is no abdominal tenderness. There is no guarding or rebound.   Musculoskeletal:         General: No tenderness, deformity or signs of injury.      Cervical back: Normal range of motion and neck supple. No tenderness.   Neurological:      Mental Status: He is alert and oriented to person, place, and time.         Medications Given in ER:   Medications   phosphorus (K PHOS NEUTRAL) tablet 2 tablet (2 tablets Oral Given 25 0308)         Orders Placed:  Orders Placed This Encounter   Procedures    CT Head Without Contrast    CT Cervical Spine  Without Contrast    Comprehensive Metabolic Panel    Magnesium    Phosphorus    CBC Auto Differential    [place] Draw    Undress & Gown    Ambulate Patient - Report tolerance to provider    LHA (on-call MD unless specified) Details    ECG 12 Lead Other; falls    Initiate Observation Status    CBC & Differential    Light Blue Top         Outpatient Medication Management:   No current Epic-ordered facility-administered medications on file.     Current Outpatient Medications Ordered in Epic   Medication Sig Dispense Refill    amiodarone (PACERONE) 200 MG tablet Take 1 tablet by mouth Daily for 30 days. 30 tablet 0    aspirin 81 MG EC tablet Take 1 tablet by mouth Daily.      atorvastatin (LIPITOR) 80 MG tablet Take 1 tablet by mouth Every Night. 90 tablet 0    Eliquis 2.5 MG tablet tablet TAKE ONE TABLET BY MOUTH TWICE DAILY 60 tablet 11    insulin lispro (HUMALOG/ADMELOG) 100 UNIT/ML injection Inject 2-7 Units under the skin into the appropriate area as directed 4 (Four) Times a Day Before Meals & at Bedtime.      levothyroxine (SYNTHROID, LEVOTHROID) 75 MCG tablet Take 1 tablet by mouth Every Morning. 30 tablet 0    midodrine (PROAMATINE) 5 MG tablet Take 1 tablet by mouth 3 (Three) Times a Day Before Meals. 90 tablet 0    pantoprazole (PROTONIX) 40 MG EC tablet Take 1 tablet by mouth 2 (Two) Times a Day Before Meals. 30 tablet 0    tamsulosin (FLOMAX) 0.4 MG capsule 24 hr capsule Take 1 capsule by mouth Daily. 30 capsule 0           Medical Decision Making:  All labs have been independently interpreted by me.  All radiology studies have been reviewed by me. All EKG's have been independently viewed and interpreted by me.  Discussion below represents my analysis of pertinent findings related to patient's condition, differential diagnosis, treatment plan and final disposition.    Differential Diagnosis includes but not limited to: Arrhythmia, electrolyte disturbance, closed head injury, traumatic brain injury, ICH,  skull fracture, vertebral body fracture    Independent Historian Required Due To: Contributes additional details of history    Review of prior external notes (non-ED) -and- Review of prior external test results outside of this encounter:  I reviewed the discharge summary of hospitalization 3/10/2025 .  Patient was initially seen for increasing weakness and falls in the home.  Complicated course including atrial flutter with rapid ventricular response causing hypotension.  He was also treated for pneumonia and symptoms improved.  He was discharged to rehab.    Labs Results:  Recent Results (from the past 24 hours)   ECG 12 Lead Other; falls    Collection Time: 04/12/25 12:56 AM   Result Value Ref Range    QT Interval 501 ms    QTC Interval 498 ms   Comprehensive Metabolic Panel    Collection Time: 04/12/25  1:01 AM    Specimen: Arm, Left; Blood   Result Value Ref Range    Glucose 227 (H) 65 - 99 mg/dL    BUN 24 (H) 8 - 23 mg/dL    Creatinine 2.86 (H) 0.76 - 1.27 mg/dL    Sodium 138 136 - 145 mmol/L    Potassium 3.4 (L) 3.5 - 5.2 mmol/L    Chloride 103 98 - 107 mmol/L    CO2 24.4 22.0 - 29.0 mmol/L    Calcium 8.7 8.6 - 10.5 mg/dL    Total Protein 6.8 6.0 - 8.5 g/dL    Albumin 3.5 3.5 - 5.2 g/dL    ALT (SGPT) 22 1 - 41 U/L    AST (SGOT) 21 1 - 40 U/L    Alkaline Phosphatase 143 (H) 39 - 117 U/L    Total Bilirubin 0.3 0.0 - 1.2 mg/dL    Globulin 3.3 gm/dL    A/G Ratio 1.1 g/dL    BUN/Creatinine Ratio 8.4 7.0 - 25.0    Anion Gap 10.6 5.0 - 15.0 mmol/L    eGFR 23.1 (L) >60.0 mL/min/1.73   Magnesium    Collection Time: 04/12/25  1:01 AM    Specimen: Arm, Left; Blood   Result Value Ref Range    Magnesium 1.7 1.6 - 2.4 mg/dL   Phosphorus    Collection Time: 04/12/25  1:01 AM    Specimen: Arm, Left; Blood   Result Value Ref Range    Phosphorus 1.7 (C) 2.5 - 4.5 mg/dL   CBC Auto Differential    Collection Time: 04/12/25  1:01 AM    Specimen: Arm, Left; Blood   Result Value Ref Range    WBC 4.64 3.40 - 10.80 10*3/mm3    RBC  3.08 (L) 4.14 - 5.80 10*6/mm3    Hemoglobin 8.4 (L) 13.0 - 17.7 g/dL    Hematocrit 27.5 (L) 37.5 - 51.0 %    MCV 89.3 79.0 - 97.0 fL    MCH 27.3 26.6 - 33.0 pg    MCHC 30.5 (L) 31.5 - 35.7 g/dL    RDW 14.5 12.3 - 15.4 %    RDW-SD 45.9 37.0 - 54.0 fl    MPV 9.6 6.0 - 12.0 fL    Platelets 258 140 - 450 10*3/mm3    Neutrophil % 66.9 42.7 - 76.0 %    Lymphocyte % 11.6 (L) 19.6 - 45.3 %    Monocyte % 13.8 (H) 5.0 - 12.0 %    Eosinophil % 7.1 (H) 0.3 - 6.2 %    Basophil % 0.0 0.0 - 1.5 %    Immature Grans % 0.6 (H) 0.0 - 0.5 %    Neutrophils, Absolute 3.10 1.70 - 7.00 10*3/mm3    Lymphocytes, Absolute 0.54 (L) 0.70 - 3.10 10*3/mm3    Monocytes, Absolute 0.64 0.10 - 0.90 10*3/mm3    Eosinophils, Absolute 0.33 0.00 - 0.40 10*3/mm3    Basophils, Absolute 0.00 0.00 - 0.20 10*3/mm3    Immature Grans, Absolute 0.03 0.00 - 0.05 10*3/mm3    nRBC 0.0 0.0 - 0.2 /100 WBC   Light Blue Top    Collection Time: 04/12/25  1:01 AM   Result Value Ref Range    Extra Tube Hold for add-ons.      Lab Comments:  My independent interpretation of the above labs: Phosphorus, otherwise consistent with baseline      Radiology:  CT Cervical Spine Without Contrast  Result Date: 4/12/2025  CT OF THE CERVICAL SPINE  HISTORY: Fall  COMPARISON: None available.  TECHNIQUE: Axial CT imaging was obtained through the cervical spine. Coronal and sagittal reformatted images were obtained.  FINDINGS: No acute fracture or subluxation of the cervical spine is seen. There is mild retrolisthesis of C4 on C5. Intervertebral disc space narrowing is most significant at C6-C7. There is no prevertebral soft tissue swelling. Images through the lung apices are clear. There are carotid calcifications. Canal stenosis is probably most significant at C4-C5 and C5-C6. Neural foraminal narrowing is noted at multiple levels, but is probably most significant at C4-C5 and C5-C6 on the right.      No acute fracture or subluxation identified.  Radiation dose reduction techniques were  utilized, including automated exposure control and exposure modulation based on body size.   This report was finalized on 4/12/2025 1:57 AM by Dr. Alice Allen M.D on Workstation: [id number]      CT Head Without Contrast  Result Date: 4/12/2025  CT OF THE HEAD WITHOUT CONTRAST  HISTORY: Fall  COMPARISON: March 16, 2025  TECHNIQUE: Axial CT imaging was obtained through the brain. No IV contrast was administered.  FINDINGS: No acute intracranial hemorrhage is seen. There is diffuse atrophy. There is periventricular and deep white matter microangiopathic change. The atrophy is relatively advanced for the age of 69. There is an old infarct noted within the right cerebellar hemisphere. No calvarial fracture is seen. Large mucous retention cyst is noted within the right maxillary sinus. There is some mucosal thickening within the ethmoid sinuses. There is a left parietal scalp lesion, which is likely a sebaceous cyst. Additional lesion is noted within the occipital region.      No acute intracranial findings.  Radiation dose reduction techniques were utilized, including automated exposure control and exposure modulation based on body size.   This report was finalized on 4/12/2025 1:55 AM by Dr. Alice Allen M.D on Workstation: [id number]      Radiology Comments:  I ordered the above imaging and reviewed the results.    My independent interpretation of the CT head: No ICH    EKG Interpreted by me: Sinus rhythm, first AV block, right bundle branch block    (Social Determinants of Health): Community and Social Context (Social Integration, Support Systems, Community Engagement, Intimate Partner Violence, Discrimination, Stress)    Rationale:  This is a 69-year-old male who is presenting today secondary to frequent falls.  He is anticoagulated.  He presents afebrile with unremarkable vital signs.    I reviewed the recent past medical history.  Patient was admitted for a similar episode last month and sent to  rehab.  He has now been discharged from the rehab facility and was at home when this occurred.  It does sound like this was a mechanical fall.    he had an EKG here which not demonstrate any cardiogenic cause of syncope like WPW, HOCM, Brugada, A-V dissociation, prolonged QTC.  Labs are notable for sequela of end-stage renal disease with new hypokalemia and hypophosphatemia, will replace.  He has otherwise stable anemia.    From a trauma perspective, patient was evaluated with a CT scan of the head and cervical spine, which does not demonstrate any acute posttraumatic abnormalities.    Will do a trial of ambulation and reevaluate.    Progress Notes:  3:15 AM: I saw and reevaluated the patient.  He was able to ambulate a few steps with a walker.  We spoke to the patient's wife, who does not feel like she is safe at home and does not feel like home-going is a safe option at this point.  I spoke with the patient about his ED work up and diagnosis. I informed the patient that he will be admitted for further evaluation/treatment. All questions answered.      Consult:  3:23 AM EDT: Discussed case with MANNY Clark with [facility].  Reviewed history, exam, results and treatments.  Will admit to Dr. Tse      Complexity of Care:  The patient requires admission.    Diagnosis:  Final diagnoses:   Frequent falls   Closed head injury, initial encounter   Chronic anticoagulation   Hypophosphatemia   Hypokalemia   Multifactorial functional impairment           Parts of this note may be an electronic transcription/translation of spoken language to printed text using the Dragon dictation system        Provider Note Signed by:     Parvin Mcnair MD  04/12/25 0328

## 2025-04-12 NOTE — CONSULTS
[facility] Consult Note      Patient Name: Carlito Mo  : 1955  MRN: [id number]  Primary Care Physician:  Belle Simons APRN  Referring Physician: No ref. provider found  Date of admission: 2025    Subjective     Reason for Consult: ESRD on HD    HPI:   Carlito Mo is a 69 y.o. male with past medical history of ESRD on HD MWF via [facility], followed by Dr. Olguin from our group, CAD, CVA, A-fib on Eliquis, COPD presents to the emergency department after mechanical fall at home.  Patient states he was discharged from rehab ([facility]) yesterday where he was receiving hemodialysis on TTS schedule and reports receiving hemodialysis on both Thursday and Friday to get him back on his normal MWF schedule.  I'm not sure if fluid was pulled during treatment yesterday, but not hypotensive here, SBP 120s, and takes midodrine 5 TID for chronic hypotension. Laboratory evaluation in the emergency department revealed potassium of 3.4, phosphorus 1.7, SCR 2.86, hemoglobin 8.4    Patient endorses feeling dizzy when he got up to go to the bathroom resulting in fall as well as generalized weakness.  Patient denies fever, chills, shortness of breath, chest pain, abdominal pain, N/V/D, dysuria.    Review of Systems:   14 point review of systems is otherwise negative except for mentioned above on HPI    Personal History     Past Medical History:   Diagnosis Date   • Acquired hypothyroidism    • Acute sinusitis    • SYLVAIN (acute kidney injury)     on CKD   • Anemia    • Arthritis    • Atrial fibrillation    • CAD (coronary artery disease)    • Chronic combined systolic and diastolic congestive heart failure    • COPD (chronic obstructive pulmonary disease)    • Depression    • Diabetic neuropathy 2022    Diabetes mellitus due to underlying condition   • Esophagitis 06/10/2020    Added automatically from request for surgery [id number]     • ESRD (end stage renal disease) 2023   • Frequent  PVCs    • Generalized weakness    • GERD (gastroesophageal reflux disease)    • Helicobacter pylori gastritis 06/04/2020   • Hyperlipidemia    • Hypertension    • Hypertensive encephalopathy    • Pleural effusion    • Pneumonia    • Poor historian    • RBBB (right bundle branch block)    • Stroke     POOR VISION   • TIA (transient ischemic attack)    • Type 2 diabetes mellitus    • Urinary retention    • Vitamin D deficiency        Past Surgical History:   Procedure Laterality Date   • APPENDECTOMY N/A 02/14/2016    Dr. Alexey Dodson   • ARTERIOVENOUS FISTULA/SHUNT SURGERY Right 06/03/2022    Procedure: RIGHT FOREARM ARTERIOVENOUS FISTULA PLACEMENT RADIOCEPHALIC WITH CEPHALIC VEIN TRANSPOSITION;  Surgeon: Eliseo Willis MD;  Location: [facility] MAIN OR;  Service: Vascular;  Laterality: Right;   • COLONOSCOPY      over 20 years ago.  no polyps    • COLONOSCOPY N/A 09/18/2018    Procedure: COLONOSCOPY;  Surgeon: Jose Enrique Louie MD;  Location: [facility] ENDOSCOPY;  Service: Gastroenterology   • COLONOSCOPY N/A 09/19/2018    IH, EH, polyps (TAs w/low grade dysplasia)   • COLONOSCOPY N/A 06/01/2020    Procedure: COLONOSCOPY;  Surgeon: Jose Enrique Louie MD;  Location: [facility] ENDOSCOPY;  Service: Gastroenterology;  Laterality: N/A;  Pre op-Anemia, Melena, History of Polyps  Post op-To Cecum/TI, Polyp, Poor Prep   • CORONARY ARTERY BYPASS GRAFT  11/2001   • ENDOSCOPY N/A 05/29/2020    Procedure: ESOPHAGOGASTRODUODENOSCOPY with biopsies;  Surgeon: Amanda Lovelace MD;  Location: [facility] ENDOSCOPY;  Service: Gastroenterology;  Laterality: N/A;  pre-anemia, dark stools  post-esophagitis, hiatal hernia   • ENDOSCOPY N/A 09/15/2020    Procedure: ESOPHAGOGASTRODUODENOSCOPY WITH BIOPSIES;  Surgeon: Jose Enrique Louie MD;  Location: [facility] ENDOSCOPY;  Service: Gastroenterology;  Laterality: N/A;  pre: history of melena and esophagitis  post: mild esophagitis and gastritis, small hiatal hernia   • ENDOSCOPY N/A 12/4/2023     Procedure: ESOPHAGOGASTRODUODENOSCOPY with bx;  Surgeon: Quoc Elmore MD;  Location: [facility] ENDOSCOPY;  Service: Gastroenterology;  Laterality: N/A;  pre: Coffee-ground emesis  post: hiatal hernia, esophagitis   • HERNIA REPAIR Left 12/05/2019   • THORACENTESIS     • TONSILLECTOMY     • VASECTOMY         Family History: family history includes Alcohol abuse in his brother and father; Autism in his son; Cancer in his father; Cirrhosis in his brother; Diabetes in his mother and son; Heart disease in his father; Hypertension in his mother; Kidney failure in his son; Liver cancer (age of onset: 45) in his brother; Macular degeneration in his mother.    Social History:  reports that he has never smoked. He has never used smokeless tobacco. He reports that he does not drink alcohol and does not use drugs.    Home Medications:  Prior to Admission medications    Medication Sig Start Date End Date Taking? Authorizing Provider   aspirin 81 MG EC tablet Take 1 tablet by mouth Daily. 3/15/25  Yes Asim Hogue MD   atorvastatin (LIPITOR) 80 MG tablet Take 1 tablet by mouth Every Night. 9/8/24  Yes Irene Barraza APRN   Eliquis 2.5 MG tablet tablet TAKE ONE TABLET BY MOUTH TWICE DAILY 2/27/25  Yes Nida Lopse APRN   levothyroxine (SYNTHROID, LEVOTHROID) 75 MCG tablet Take 1 tablet by mouth Every Morning. 9/9/24  Yes Irene Barraza APRN   pantoprazole (PROTONIX) 40 MG EC tablet Take 1 tablet by mouth 2 (Two) Times a Day Before Meals. 9/8/24  Yes Irene Barraza APRN   tamsulosin (FLOMAX) 0.4 MG capsule 24 hr capsule Take 1 capsule by mouth Daily. 9/8/24  Yes Irene Barraza APRN   vitamin D3 125 MCG (5000 UT) capsule capsule Take 1 capsule by mouth Daily.   Yes Provider, MD Alonzo   amiodarone (PACERONE) 200 MG tablet Take 1 tablet by mouth Daily for 30 days. 3/21/25 4/20/25  Jacques Alonzo MD   insulin lispro (HUMALOG/ADMELOG) 100 UNIT/ML injection Inject 2-7 Units under the skin into  the appropriate area as directed 4 (Four) Times a Day Before Meals & at Bedtime. 3/15/25   Asim Hogue MD   midodrine (PROAMATINE) 5 MG tablet Take 1 tablet by mouth 3 (Three) Times a Day Before Meals. 3/20/25   Jacques Alonzo MD       Allergies:  Allergies   Allergen Reactions   • Prozac [Fluoxetine Hcl] Other (See Comments)     shaking       Objective     Vitals:   Temp:  [97.7 °F (36.5 °C)-98 °F (36.7 °C)] 97.7 °F (36.5 °C)  Heart Rate:  [57-62] 58  Resp:  [16-20] 20  BP: (111-124)/(53-61) 124/61    Intake/Output Summary (Last 24 hours) at 4/12/2025 1114  Last data filed at 4/12/2025 0900  Gross per 24 hour   Intake 400 ml   Output --   Net 400 ml       Physical Exam:   Constitutional: frail pleasant WM No distress on RA   HEENT: Sclera anicteric, no conjunctival injection  Neck: no JVD  Respiratory: Clear to auscultation bilaterally, nonlabored respiration  Cardiovascular: RRR, no murmurs, no rubs or gallops, no carotid bruit  Gastrointestinal: Positive bowel sounds, abdomen is soft, nontender and nondistended.  Right upper extremity AVG with positive thrill and bruit  : No palpable bladder  Musculoskeletal: No edema, no clubbing or cyanosis  Psychiatric: Appropriate affect, cooperative  Neurologic: Oriented x3, moving all extremities, normal speech and mental status  Skin: Warm and dry       Scheduled Meds:     amiodarone, 200 mg, Oral, Q24H  apixaban, 2.5 mg, Oral, BID  aspirin, 81 mg, Oral, Daily  atorvastatin, 80 mg, Oral, Nightly  cholecalciferol, 5,000 Units, Oral, Daily  insulin lispro, 2-9 Units, Subcutaneous, 4x Daily AC & at Bedtime  levothyroxine, 75 mcg, Oral, Q AM  midodrine, 5 mg, Oral, TID AC  pantoprazole, 40 mg, Oral, BID AC  sodium chloride, 10 mL, Intravenous, Q12H  [Held by provider] tamsulosin, 0.4 mg, Oral, Daily      IV Meds:        Results Reviewed:   I have personally reviewed the results from the time of this admission to 4/12/2025 11:14 EDT     Lab Results   Component  Value Date    GLUCOSE 227 (H) 04/12/2025    CALCIUM 8.7 04/12/2025     04/12/2025    K 3.4 (L) 04/12/2025    CO2 24.4 04/12/2025     04/12/2025    BUN 24 (H) 04/12/2025    CREATININE 2.86 (H) 04/12/2025    EGFRIFAFRI 23 (L) 09/27/2022    EGFRIFNONA 19 (L) 02/04/2022    BCR 8.4 04/12/2025    ANIONGAP 10.6 04/12/2025      Lab Results   Component Value Date    MG 1.7 04/12/2025    PHOS 1.7 (C) 04/12/2025    ALBUMIN 3.5 04/12/2025           Assessment / Plan     ASSESSMENT:  ESRD on HD MWF via [facility] at [facility], dialyzed on THURS & FRI at rehab to get back in sync with MWF schedule.  Unclear if excess ultrafiltration during treatment yesterday factored into fall, but debilitated in general and states was not making much progress with PT  Hypotension with ESRD: On midodrine 5 TID, resting BP ok  Fall with generalized weakness: Concern for orthostatic changes due to patient endorsing lightheadedness with standing.  Patient is noted to be on tamsulosin which can cause decreased blood pressure.  PT/OT consulted.  Hypokalemia/phosphatemia: K 3.4 & Phos 1.7 this morning, repleted orally  Diabetes mellitus type 2 with ESRD: A1c 7.1.  Mgmt per primary team  BPH: On tamsulosin.  Needed still?   Anemia of chronic disease: hgb stable at 8.4  Hyperlipidemia: On Lipitor    PLAN:  Next dialysis MON  Hold tamsulosin pending orthostatic blood pressure; d/w RN  K Phos given orally  Continue midodrine 5 TID   Remove renal restrictions from diet given low K/Phos    Thank you for involving us in the care of Carlito Mo.  Please feel free to call with any questions.    Jose Enrique Montelongo MD  04/12/25  11:14 EDT    [facility]  [phone]    Parts of this note may be an electronic transcription/translation of spoken language to printed text using the Dragon dictation system.

## 2025-04-12 NOTE — THERAPY EVALUATION
Patient Name: Carlito Mo  : 1955    MRN: [id number]                              Today's Date: 2025       Admit Date: 2025    Visit Dx:     ICD-10-CM ICD-9-CM   1. Frequent falls  R29.6 V15.88   2. Closed head injury, initial encounter  S09.90XA 959.01   3. Chronic anticoagulation  Z79.01 V58.61   4. Hypophosphatemia  E83.39 275.3   5. Hypokalemia  E87.6 276.8   6. Multifactorial functional impairment  R53.81 799.3     Patient Active Problem List   Diagnosis    Major depressive disorder with single episode, in partial remission    Other hyperlipidemia    Type 2 diabetes mellitus, with long-term current use of insulin    Vitamin D deficiency    Erectile dysfunction    Chronic fatigue    Type 2 diabetes mellitus with ophthalmic complication    Benign prostatic hyperplasia with nocturia    History of basal cell carcinoma    Acquired hypothyroidism    Recurrent strokes    Suspected sleep apnea    YAW (obstructive sleep apnea)    Coronary artery disease involving native coronary artery of native heart without angina pectoris    Chronically elevated troponin    Colon cancer screening    Enlarged lymph nodes    Functional gait abnormality    History of myocardial infarction    Insomnia    Iron deficiency anemia    Major depression, recurrent    Essential hypertension    Acute on chronic renal insufficiency    Falls frequently    Acute on chronic anemia    Neurogenic orthostatic hypotension    Anemia, chronic disease    History of colon polyps    Helicobacter pylori gastritis    Esophagitis    Embolic stroke    Patent foramen ovale    Cardiomyopathy    Diarrhea    Generalized weakness    Paroxysmal atrial fibrillation    Chronic anticoagulation    Acute ischemic stroke    History of stroke    Iron deficiency anemia    Acute HFrEF (heart failure with reduced ejection fraction)    ESRD (end stage renal disease)    Hemoptysis    Chronic HFrEF (heart failure with reduced ejection fraction)    Hemodialysis  patient    Exertional dyspnea    Severe malnutrition    Ataxia    Cerebellar stroke, acute    Medically noncompliant    Vertebral artery stenosis, bilateral    Carotid stenosis, right    Ileus    Frequent falls     Past Medical History:   Diagnosis Date    Acquired hypothyroidism     Acute sinusitis     SYLVAIN (acute kidney injury)     on CKD    Anemia     Arthritis     Atrial fibrillation     CAD (coronary artery disease)     Chronic combined systolic and diastolic congestive heart failure     COPD (chronic obstructive pulmonary disease)     Depression     Diabetic neuropathy 04/11/2022    Diabetes mellitus due to underlying condition    Esophagitis 06/10/2020    Added automatically from request for surgery [id number]      ESRD (end stage renal disease) 12/01/2023    Frequent PVCs     Generalized weakness     GERD (gastroesophageal reflux disease)     Helicobacter pylori gastritis 06/04/2020    Hyperlipidemia     Hypertension     Hypertensive encephalopathy     Pleural effusion     Pneumonia     Poor historian     RBBB (right bundle branch block)     Stroke     POOR VISION    TIA (transient ischemic attack)     Type 2 diabetes mellitus     Urinary retention     Vitamin D deficiency      Past Surgical History:   Procedure Laterality Date    APPENDECTOMY N/A 02/14/2016    Dr. Alexey Dodson    ARTERIOVENOUS FISTULA/SHUNT SURGERY Right 06/03/2022    Procedure: RIGHT FOREARM ARTERIOVENOUS FISTULA PLACEMENT RADIOCEPHALIC WITH CEPHALIC VEIN TRANSPOSITION;  Surgeon: Eliseo Willis MD;  Location: [facility] MAIN OR;  Service: Vascular;  Laterality: Right;    COLONOSCOPY      over 20 years ago.  no polyps     COLONOSCOPY N/A 09/18/2018    Procedure: COLONOSCOPY;  Surgeon: Jose Enrique Louie MD;  Location: [facility] ENDOSCOPY;  Service: Gastroenterology    COLONOSCOPY N/A 09/19/2018    IH, EH, polyps (TAs w/low grade dysplasia)    COLONOSCOPY N/A 06/01/2020    Procedure: COLONOSCOPY;  Surgeon: Jose Enrique Louie MD;  Location:   [facility];  Service: Gastroenterology;  Laterality: N/A;  Pre op-Anemia, Melena, History of Polyps  Post op-To Cecum/TI, Polyp, Poor Prep    CORONARY ARTERY BYPASS GRAFT  11/2001    ENDOSCOPY N/A 05/29/2020    Procedure: ESOPHAGOGASTRODUODENOSCOPY with biopsies;  Surgeon: Amanda Lovelace MD;  Location:  [facility];  Service: Gastroenterology;  Laterality: N/A;  pre-anemia, dark stools  post-esophagitis, hiatal hernia    ENDOSCOPY N/A 09/15/2020    Procedure: ESOPHAGOGASTRODUODENOSCOPY WITH BIOPSIES;  Surgeon: Jose Enrique Louie MD;  Location:  [facility];  Service: Gastroenterology;  Laterality: N/A;  pre: history of melena and esophagitis  post: mild esophagitis and gastritis, small hiatal hernia    ENDOSCOPY N/A 12/4/2023    Procedure: ESOPHAGOGASTRODUODENOSCOPY with bx;  Surgeon: Quoc Elmore MD;  Location: [facility] ENDOSCOPY;  Service: Gastroenterology;  Laterality: N/A;  pre: Coffee-ground emesis  post: hiatal hernia, esophagitis    HERNIA REPAIR Left 12/05/2019    THORACENTESIS      TONSILLECTOMY      VASECTOMY        General Information       Row Name 04/12/25 1419          Physical Therapy Time and Intention    Document Type evaluation  -CS     Mode of Treatment individual therapy;physical therapy  -CS       Row Name 04/12/25 1419          General Information    Patient Profile Reviewed yes  -CS     Prior Level of Function min assist:;mod assist:;gait;ADL's  -CS     Existing Precautions/Restrictions fall  -CS     Barriers to Rehab previous functional deficit  -CS       Row Name 04/12/25 1419          Living Environment    Current Living Arrangements home  -CS     People in Home spouse  -CS       Row Name 04/12/25 1419          Home Main Entrance    Number of Stairs, Main Entrance three  -CS       Row Name 04/12/25 1419          Stairs Within Home, Primary    Number of Stairs, Within Home, Primary nine  -CS     Stairs Comment, Within Home, Primary States he has 3 GREG home and an  additional 9 to enter his bedroom.  -CS       Row Name 04/12/25 1419          Cognition    Orientation Status (Cognition) oriented x 3  -CS       Row Name 04/12/25 1419          Safety Issues/Impairments Affecting Functional Mobility    Safety Issues Affecting Function (Mobility) positioning of assistive device  -CS     Impairments Affecting Function (Mobility) balance;endurance/activity tolerance;pain;strength;range of motion (ROM);postural/trunk control  -CS     Comment, Safety Issues/Impairments (Mobility) Pt stays outside of RWx for most of ambulation due to increased fatigue at LEs and inability to keep up, cued to stay w/in ADRIEL and able to maintain for a step at most.  -CS               User Key  (r) = Recorded By, (t) = Taken By, (c) = Cosigned By      Initials Name Provider Type    CS Prabhakar Marie, PT Physical Therapist                   Mobility       Row Name 04/12/25 1422          Bed Mobility    Bed Mobility sit-supine  -CS     Sit-Supine [place] (Bed Mobility) standby assist  -CS     Comment, (Bed Mobility) Pt UIC upon arrival  -CS       Row Name 04/12/25 1422          Sit-Stand Transfer    Sit-Stand [place] (Transfers) minimum assist (75% patient effort)  -CS     Assistive Device (Sit-Stand Transfers) walker, front-wheeled  -CS       Row Name 04/12/25 1422          Gait/Stairs (Locomotion)    [place] Level (Gait) minimum assist (75% patient effort)  -CS     Assistive Device (Gait) walker, front-wheeled  -CS     Patient was able to Ambulate yes  -CS     Distance in Feet (Gait) 12  -CS     Deviations/Abnormal Patterns (Gait) eli decreased;stride length decreased;weight shifting decreased;base of support, wide;gait speed decreased  -CS     Bilateral Gait Deviations forward flexed posture;heel strike decreased  -CS     [place] Level (Stairs) not tested  -CS     Comment, (Gait/Stairs) Pt w/ poor endurance and fatigues quickly, anterior lean steadily increases, noted w/ increased  thoracic kyphosis attributing to further anterior trunk lean.  -CS               User Key  (r) = Recorded By, (t) = Taken By, (c) = Cosigned By      Initials Name Provider Type    Prabhakar Reynoso PT Physical Therapist                   Obj/Interventions       Row Name 04/12/25 1423          Range of Motion Comprehensive    General Range of Motion bilateral upper extremity ROM WFL  -       Row Name 04/12/25 1423          Strength Comprehensive (MMT)    Comment, General Manual Muscle Testing (MMT) Assessment B hips at 3/5, B knees at 4/5, B ankles at 3+/5  -       Row Name 04/12/25 1423          Motor Skills    Motor Skills functional endurance  -CS     Functional Endurance poor for PT eval  -CS       Row Name 04/12/25 1423          Balance    Balance Assessment sitting static balance;sitting dynamic balance;standing static balance;standing dynamic balance  -CS     Static Sitting Balance standby assist  -CS     Dynamic Sitting Balance standby assist  -CS     Position, Sitting Balance sitting edge of bed;sitting in chair  -CS     Static Standing Balance contact guard  -CS     Dynamic Standing Balance minimal assist;contact guard  -CS     Position/Device Used, Standing Balance supported;walker, front-wheeled  -CS     Balance Interventions sitting;standing;sit to stand;supported;static;dynamic  -CS     Comment, Balance No overt LOB but fatigue sets in quick limiting his stability and posing a falls risk  -CS               User Key  (r) = Recorded By, (t) = Taken By, (c) = Cosigned By      Initials Name Provider Type    Prabhakar Reynoso PT Physical Therapist                   Goals/Plan       Row Name 04/12/25 1425          Bed Mobility Goal 1 (PT)    Activity/Assistive Device (Bed Mobility Goal 1, PT) bed mobility activities, all  -CS     [place] Level/Cues Needed (Bed Mobility Goal 1, PT) standby assist  -CS     Time Frame (Bed Mobility Goal 1, PT) short term goal (STG);10 days  -       Row Name 04/12/25  1425          Transfer Goal 1 (PT)    Activity/Assistive Device (Transfer Goal 1, PT) bed-to-chair/chair-to-bed  -CS     [place] Level/Cues Needed (Transfer Goal 1, PT) contact guard required  -CS     Time Frame (Transfer Goal 1, PT) short term goal (STG);10 days  -CS       Row Name 04/12/25 1425          Gait Training Goal 1 (PT)    Activity/Assistive Device (Gait Training Goal 1, PT) gait (walking locomotion)  -CS     [place] Level (Gait Training Goal 1, PT) contact guard required  -CS     Distance (Gait Training Goal 1, PT) 25ft  -CS     Time Frame (Gait Training Goal 1, PT) short term goal (STG);10 days  -CS               User Key  (r) = Recorded By, (t) = Taken By, (c) = Cosigned By      Initials Name Provider Type    CS Prabhakar Marie, PT Physical Therapist                   Clinical Impression       Row Name 04/12/25 1424          Pain    Pretreatment Pain Rating 0/10 - no pain  -CS     Posttreatment Pain Rating 0/10 - no pain  -CS       Row Name 04/12/25 1424          Plan of Care Review    Plan of Care Reviewed With patient  -CS     Progress no change  -CS     Outcome Evaluation Pt is a 69 y.o. male admitted to [facility] due to a fall on 4/12/2025. Pt was just recently d/c from acute rehab. 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 Prior to hospital admission, pt reports residing at home after recently being discharged from acute rehab. He states his wife is too weak to assist much at home but he is not independent. At home he has 3 GREG his house and reports an additional 9 to his bedroom. Seated in recliner upon entry, required [place] for STS, [place] for ambulation w/ RWx for 12ft but fatigued quickly and needed to return to bed. Pt was able to perform sit to supine w/ SBA. Pt will benefit from skilled PT to address the previous deficits and improve overall safety with functional mobility. Pt not safe to return home at this time.  Anticipate pt will D/C to acute rehab pending progress.  -CS       Row Name 04/12/25 1424          Therapy Assessment/Plan (PT)    Rehab Potential (PT) fair  -CS     Criteria for Skilled Interventions Met (PT) yes  -CS     Therapy Frequency (PT) 6 times/wk  -CS       Row Name 04/12/25 1424          Positioning and Restraints    Pre-Treatment Position sitting in chair/recliner  -CS     Post Treatment Position bed  -CS     In Bed notified nsg;fowlers;exit alarm on;encouraged to call for assist;call light within reach  -CS               User Key  (r) = Recorded By, (t) = Taken By, (c) = Cosigned By      Initials Name Provider Type    CS Prabhakar Marie, PT Physical Therapist                   Outcome Measures       Row Name 04/12/25 1425 04/12/25 0613       How much help from another person do you currently need...    Turning from your back to your side while in flat bed without using bedrails? 3  -CS 2  -SB    Moving from lying on back to sitting on the side of a flat bed without bedrails? 3  -CS 3  -SB    Moving to and from a bed to a chair (including a wheelchair)? 3  -CS 3  -SB    Standing up from a chair using your arms (e.g., wheelchair, bedside chair)? 3  -CS 3  -SB    Climbing 3-5 steps with a railing? 2  -CS 3  -SB    To walk in hospital room? 3  -CS 3  -SB    AM-PAC 6 Clicks Score (PT) 17  -CS 17  -SB      Row Name 04/12/25 1335          Modified [place] Scale    Modified [place] Scale 4 - Moderately severe disability.  Unable to walk without assistance, and unable to attend to own bodily needs without assistance.  -KG       Row Name 04/12/25 1336          Functional Assessment    Outcome Measure Options AM-PAC 6 Clicks Daily Activity (OT);Modified [place]  -KG               User Key  (r) = Recorded By, (t) = Taken By, (c) = Cosigned By      Initials Name Provider Type    Remedios Banks, RN Registered Nurse    Manolo Natarajan, OT Occupational Therapist    Prabhakar Reynoso, DENISSE Physical Therapist                                  Physical Therapy Education       Title: PT OT SLP Therapies (In Progress)       Topic: Physical Therapy (Done)       Point: Mobility training (Done)       Learning Progress Summary            Patient Acceptance, E, VU by  at 4/12/2025 1426                      Point: Home exercise program (Done)       Learning Progress Summary            Patient Acceptance, E, VU by CS at 4/12/2025 1426                      Point: Body mechanics (Done)       Learning Progress Summary            Patient Acceptance, E, VU by CS at 4/12/2025 1426                      Point: Precautions (Done)       Learning Progress Summary            Patient Acceptance, E, VU by  at 4/12/2025 1426                                      User Key       Initials Effective Dates Name Provider Type Discipline     09/06/24 -  Prabhakar Marie, DENISSE Physical Therapist PT                  PT Recommendation and Plan     Progress: no change  Outcome Evaluation: Pt is a 69 y.o. male admitted to [facility] due to a fall on 4/12/2025. Pt was just recently d/c from acute rehab. 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 Prior to hospital admission, pt reports residing at home after recently being discharged from acute rehab. He states his wife is too weak to assist much at home but he is not independent. At home he has 3 GREG his house and reports an additional 9 to his bedroom. Seated in recliner upon entry, required Maynor for STS, Maynor for ambulation w/ RWx for 12ft but fatigued quickly and needed to return to bed. Pt was able to perform sit to supine w/ SBA. Pt will benefit from skilled PT to address the previous deficits and improve overall safety with functional mobility. Pt not safe to return home at this time. Anticipate pt will D/C to acute rehab pending progress.     Time Calculation:         PT Charges       Row Name 04/12/25 1426             Time Calculation    Start Time  1355  -CS      Stop Time 1408  -CS      Time Calculation (min) 13 min  -CS      PT Received On 04/12/25  -CS      PT - Next Appointment 04/14/25  -CS      PT Goal Re-Cert Due Date 04/21/25  -CS         Time Calculation- PT    Total Timed Code Minutes- PT 8 minute(s)  -CS         Timed Charges    96409 - PT Therapeutic Activity Minutes 8  -CS         Untimed Charges    PT Eval/Re-eval Minutes 5  -CS         Total Minutes    Timed Charges Total Minutes 8  -CS      Untimed Charges Total Minutes 5  -CS       Total Minutes 13  -CS                User Key  (r) = Recorded By, (t) = Taken By, (c) = Cosigned By      Initials Name Provider Type    CS Prabhakar Marie, PT Physical Therapist                  Therapy Charges for Today       Code Description Service Date Service Provider Modifiers Qty    23111466987 HC PT THERAPEUTIC ACT EA 15 MIN 4/12/2025 Prabhakar Marie, PT GP 1    20779833853 HC PT EVAL MOD COMPLEXITY 2 4/12/2025 Prabhakar Marie, PT GP 1            PT G-Codes  Outcome Measure Options: AM-PAC 6 Clicks Daily Activity (OT), Modified [place]  AM-PAC 6 Clicks Score (PT): 17  AM-PAC 6 Clicks Score (OT): 16  Modified [place] Scale: 4 - Moderately severe disability.  Unable to walk without assistance, and unable to attend to own bodily needs without assistance.  PT Discharge Summary  Anticipated Discharge Disposition (PT): inpatient rehabilitation facility, extended care facility    Prabhakar Marie PT  4/12/2025

## 2025-04-12 NOTE — PLAN OF CARE
Goal Outcome Evaluation:  Plan of Care Reviewed With: patient           Outcome Evaluation: Pt admitted to [facility] for frequent falls. Pt recently admitted last month for aflutter, hypotension, PNA and was discharged from here to [facility]. Pt reports being home for 3 hours before falling and returning to the ED. Pt inconsistently reporting PLOF -- initially states he was performing transfers only upon return home, then later reports ambulating w/ RW. Wife assists w/ ADLs. Today, pt was SBA for bed mobility. Needing Min A for STS and CGA to step to recliner w/ RW. Further activity limited due to dizziness and fatigue. OT will continue to follow to address strength, balance, and activity tolerance deficits while inpatient.  Recommend SNF at this time.    Anticipated Discharge Disposition (OT): skilled nursing facility

## 2025-04-12 NOTE — CASE MANAGEMENT/SOCIAL WORK
Discharge Planning Assessment  [facility]     Patient Name: Carlito Mo  MRN: [id number]  Today's Date: 4/12/2025    Admit Date: 4/12/2025    Plan: Pending discussions with pt/family and MD's   Discharge Needs Assessment       Row Name 04/12/25 1403       Living Environment    People in Home significant other;spouse    Current Living Arrangements home    Potentially Unsafe Housing Conditions none    Primary Care Provided by self;spouse/significant other    Family Caregiver if Needed spouse    Family Caregiver Names Lissette    Quality of Family Relationships helpful;involved    Able to Return to Prior Arrangements no       Resource/Environmental Concerns    Resource/Environmental Concerns home accessibility    Home Accessibility Concerns stairs to access bedroom or bathroom;stairs to enter home  tri level home    Transportation Concerns none       Transition Planning    Patient/Family Anticipates Transition to inpatient rehabilitation facility;long-term care facility;home with help/services    Patient/Family Anticipated Services at Transition home health care;hospice care;skilled nursing    Transportation Anticipated other (see comments)       Discharge Needs Assessment    Readmission Within the Last 30 Days lack of support;previous discharge plan unsuccessful    Equipment Currently Used at Home walker, rolling;cane, straight;cpap    Concerns to be Addressed adjustment to diagnosis/illness    Anticipated Changes Related to Illness inability to care for self    Equipment Needed After Discharge none                   Discharge Plan       Row Name 04/12/25 1403       Plan    Plan Pending discussions with pt/family and MD's    Patient/Family in Agreement with Plan yes    Plan Comments Spoke with wife by phone, verified facesheet and pharmacy information. Lengthy discussion with her regarding patient goals. Pt had recently a few months ago discussed potentially stopping HD. He was hoping to live long enough that his  grandchildren would remember him, they are 2 and 3 y/o. Pt DC to [facility] for STR, when discharged they appealed and was denied for continued stay. Pt home is multi-level and will need to be able to travel out for HD. Pt suffered fall once home and returned to hospital. Discussed with Dr. Hogue to hopefully facilitate a goals of care discussion, would also like to include renal into that since HD main factor. If patient chooses rehab wife does not want him to return to [facility], would need in house HD and precert to attend. If ins denies for STR would likely need LTC and we did not discuss specifics on that at this time. MD's to discuss with patient and wife goals prior to further dc planning, pt is current with [facility]  and has walker, cpap and cane at home. CCP will follow - Amanda S.      Pop Name 04/12/25 7889       Plan    Plan Comments Received word from MD pt likely ready for discharge back to facility or would need discharge planning. Pt recently admitted to [facility], referral sent/pending in case this is the plan. Pt would need precert to return. LVM for pt wife Lissette to discuss plan will await return call to complete screening and continue dc planning. - Amanda BERNAL                  Continued Care and Services - Admitted Since 4/12/2025       Destination       Service Provider Request Status Services Address Phone Fax Patient Preferred    [facility] Accepted -- [address] [address] [phone] --                  Selected Continued Care - Prior Encounters Includes continued care and service providers with selected services from prior encounters from 1/12/2025 to 4/12/2025      Discharged on 3/20/2025 Admission date: 3/10/2025 - Discharge disposition: Skilled Nursing Facility (DC - External)      Destination       Service Provider Services Address Phone Fax Patient Preferred    [facility]  Skilled Nursing [address] [address] [phone] --                             Demographic Summary       Row Name 04/12/25 1403       General Information    Admission Type observation                   Functional Status       Row Name 04/12/25 1403       Functional Status    Usual Activity Tolerance good    Current Activity Tolerance fair       Assessment of Health Literacy    Health Literacy Moderate       Functional Status, IADL    Medications assistive person    Meal Preparation assistive person    Housekeeping assistive person    Laundry assistive person    Shopping assistive person       Mental Status Summary    Recent Changes in Mental Status/Cognitive Functioning unable to assess                   Psychosocial    No documentation.                  Abuse/Neglect    No documentation.                  Legal       Row Name 04/12/25 1403       Financial/Legal    Who Manages Finances if Patient Unable wife                   Substance Abuse    No documentation.                  Patient Forms    No documentation.                     Amanda Mosqueda RN

## 2025-04-12 NOTE — DISCHARGE PLACEMENT REQUEST
"Carlito Mo (69 y.o. Male)       Date of Birth   1955    Social Security Number       Address   [address] [place] [id number]    Home Phone   [phone]    MRN   [id number]       [facility]    Marital Status                               Admission Date   4/12/2025    Admission Type   Emergency    Admitting Provider   Asim Hogue MD    Attending Provider   Asim Hogue MD    Department, Room/Bed   [address], [address]/1       Discharge Date       Discharge Disposition       Discharge Destination                                 Attending Provider: Asim Hogue MD    Allergies: Prozac [Fluoxetine Hcl]    Isolation: None   Infection: None   Code Status: CPR    Ht: 182.9 cm (72\")   Wt: 63.5 kg (140 lb)    Admission Cmt: None   Principal Problem: Frequent falls [R29.6]                   Active Insurance as of 4/12/2025       Primary Coverage       Payor Plan Insurance Group Employer/Plan Group    HUMANA MEDICARE REPLACEMENT HUMANA MEDICARE ADVANTAGE HMO [id number]       Payor Plan Address Payor Plan Phone Number Payor Plan Fax Number Effective Dates    [address] [address] [phone]  1/1/2025 - None Entered    [facility] [id number]         Subscriber Name Subscriber Birth Date Member ID       CARLITO MO 1955 [id number]                     Emergency Contacts        (Rel.) Home Phone Work Phone Mobile Phone    Lissette Mo (Spouse) [phone] -- [phone]    Mohamud Mo (Son) -- -- [phone]                "

## 2025-04-12 NOTE — H&P
Patient Name:  Carlito Mo  YOB: 1955  MRN:  [id number]  Admit Date:  4/12/2025  Patient Care Team:  Belle Simons APRN as PCP - General (Family Medicine)  Amber Murguia MD as Consulting Physician (Cardiology)  Sera La BRYAN as Pharmacist  Seth Ladd MD as Surgeon (General Surgery)  Kim Hoyos MD PhD as Consulting Physician (Hematology and Oncology)  Jerome Diallo MD as Referring Physician (Family Medicine)  Jose Enrique Louie MD as Consulting Physician (Gastroenterology)  Nima Huddleston MD as Consulting Physician (Nephrology)      Subjective   History Present Illness     Chief Complaint   Patient presents with   • Fall       Patient is a 69 y.o. male ESRD on dialysis, CAD, Afib and h/o stroke on eliquis. He was seen last month here for aflutter, hypotension on midodrine, PNA and was discharged from here to rehab.    Discharged from rehab yesterday, had a fall at home. Imaging negative in the ER. He denies any pain from the fall.     History of Present Illness  Review of Systems   HENT: Negative.     Eyes: Negative.    Respiratory: Negative.     Cardiovascular: Negative.    Gastrointestinal: Negative.    Endocrine: Negative.    Genitourinary: Negative.    Skin: Negative.    Allergic/Immunologic: Negative.    Neurological:  Positive for weakness. Negative for syncope.   Hematological: Negative.    Psychiatric/Behavioral: Negative.          Personal History     Past Medical History:   Diagnosis Date   • Acquired hypothyroidism    • Acute sinusitis    • SYLVAIN (acute kidney injury)     on CKD   • Anemia    • Arthritis    • Atrial fibrillation    • CAD (coronary artery disease)    • Chronic combined systolic and diastolic congestive heart failure    • COPD (chronic obstructive pulmonary disease)    • Depression    • Diabetic neuropathy 04/11/2022    Diabetes mellitus due to underlying condition   • Esophagitis 06/10/2020    Added automatically from request for  surgery [id number]     • ESRD (end stage renal disease) 12/01/2023   • Frequent PVCs    • Generalized weakness    • GERD (gastroesophageal reflux disease)    • Helicobacter pylori gastritis 06/04/2020   • Hyperlipidemia    • Hypertension    • Hypertensive encephalopathy    • Pleural effusion    • Pneumonia    • Poor historian    • RBBB (right bundle branch block)    • Stroke     POOR VISION   • TIA (transient ischemic attack)    • Type 2 diabetes mellitus    • Urinary retention    • Vitamin D deficiency      Past Surgical History:   Procedure Laterality Date   • APPENDECTOMY N/A 02/14/2016    Dr. Alexey Dodson   • ARTERIOVENOUS FISTULA/SHUNT SURGERY Right 06/03/2022    Procedure: RIGHT FOREARM ARTERIOVENOUS FISTULA PLACEMENT RADIOCEPHALIC WITH CEPHALIC VEIN TRANSPOSITION;  Surgeon: Eliseo Willis MD;  Location: [facility] MAIN OR;  Service: Vascular;  Laterality: Right;   • COLONOSCOPY      over 20 years ago.  no polyps    • COLONOSCOPY N/A 09/18/2018    Procedure: COLONOSCOPY;  Surgeon: Jose Enrique Louie MD;  Location: [facility] ENDOSCOPY;  Service: Gastroenterology   • COLONOSCOPY N/A 09/19/2018    IH, EH, polyps (TAs w/low grade dysplasia)   • COLONOSCOPY N/A 06/01/2020    Procedure: COLONOSCOPY;  Surgeon: Jose Enrique Louie MD;  Location: [facility] ENDOSCOPY;  Service: Gastroenterology;  Laterality: N/A;  Pre op-Anemia, Melena, History of Polyps  Post op-To Cecum/TI, Polyp, Poor Prep   • CORONARY ARTERY BYPASS GRAFT  11/2001   • ENDOSCOPY N/A 05/29/2020    Procedure: ESOPHAGOGASTRODUODENOSCOPY with biopsies;  Surgeon: Amanda Lovelace MD;  Location: [facility] ENDOSCOPY;  Service: Gastroenterology;  Laterality: N/A;  pre-anemia, dark stools  post-esophagitis, hiatal hernia   • ENDOSCOPY N/A 09/15/2020    Procedure: ESOPHAGOGASTRODUODENOSCOPY WITH BIOPSIES;  Surgeon: Jose Enrique Louie MD;  Location: [facility] ENDOSCOPY;  Service: Gastroenterology;  Laterality: N/A;  pre: history of melena and esophagitis  post: mild  esophagitis and gastritis, small hiatal hernia   • ENDOSCOPY N/A 2023    Procedure: ESOPHAGOGASTRODUODENOSCOPY with bx;  Surgeon: Quoc Elmore MD;  Location: [facility] ENDOSCOPY;  Service: Gastroenterology;  Laterality: N/A;  pre: Coffee-ground emesis  post: hiatal hernia, esophagitis   • HERNIA REPAIR Left 2019   • THORACENTESIS     • TONSILLECTOMY     • VASECTOMY       Family History   Problem Relation Age of Onset   • Diabetes Mother    • Hypertension Mother    • Macular degeneration Mother    • Alcohol abuse Father    • Cancer Father         lung,  age 65   • Heart disease Father    • Alcohol abuse Brother    • Cirrhosis Brother          age 50   • Liver cancer Brother 45   • Diabetes Son    • Kidney failure Son    • Autism Son    • Malig Hyperthermia Neg Hx      Social History     Tobacco Use   • Smoking status: Never   • Smokeless tobacco: Never   • Tobacco comments:     CAFFEINE - OCCAS. SODA   Vaping Use   • Vaping status: Never Used   Substance Use Topics   • Alcohol use: No     Comment: last drink 2 months ago   • Drug use: No     Medications Prior to Admission   Medication Sig Dispense Refill Last Dose/Taking   • aspirin 81 MG EC tablet Take 1 tablet by mouth Daily.   2025 Morning   • atorvastatin (LIPITOR) 80 MG tablet Take 1 tablet by mouth Every Night. 90 tablet 0 2025 Morning   • Eliquis 2.5 MG tablet tablet TAKE ONE TABLET BY MOUTH TWICE DAILY 60 tablet 11 2025 Morning   • levothyroxine (SYNTHROID, LEVOTHROID) 75 MCG tablet Take 1 tablet by mouth Every Morning. 30 tablet 0 2025 Morning   • pantoprazole (PROTONIX) 40 MG EC tablet Take 1 tablet by mouth 2 (Two) Times a Day Before Meals. 30 tablet 0 2025 Morning   • tamsulosin (FLOMAX) 0.4 MG capsule 24 hr capsule Take 1 capsule by mouth Daily. 30 capsule 0 2025 Morning   • vitamin D3 125 MCG (5000 UT) capsule capsule Take 1 capsule by mouth Daily.   2025   • amiodarone (PACERONE) 200 MG  tablet Take 1 tablet by mouth Daily for 30 days. 30 tablet 0 Unknown   • insulin lispro (HUMALOG/ADMELOG) 100 UNIT/ML injection Inject 2-7 Units under the skin into the appropriate area as directed 4 (Four) Times a Day Before Meals & at Bedtime.      • midodrine (PROAMATINE) 5 MG tablet Take 1 tablet by mouth 3 (Three) Times a Day Before Meals. 90 tablet 0 Unknown     Allergies:    Allergies   Allergen Reactions   • Prozac [Fluoxetine Hcl] Other (See Comments)     shaking       Objective    Objective     Vital Signs  Temp:  [97.7 °F (36.5 °C)-98 °F (36.7 °C)] 97.7 °F (36.5 °C)  Heart Rate:  [57-62] 58  Resp:  [16-20] 20  BP: (111-124)/(53-61) 124/61  SpO2:  [95 %-100 %] 98 %  on   ;      Body mass index is 18.99 kg/m².    Physical Exam  Vitals and nursing note reviewed.   Constitutional:       General: He is not in acute distress.     Appearance: He is well-developed. He is not diaphoretic.   HENT:      Head: Normocephalic and atraumatic.   Eyes:      General: No scleral icterus.     Conjunctiva/sclera: Conjunctivae normal.   Neck:      Vascular: No JVD.   Cardiovascular:      Rate and Rhythm: Normal rate and regular rhythm.      Heart sounds: Normal heart sounds. No murmur heard.  Pulmonary:      Effort: Pulmonary effort is normal. No respiratory distress.      Breath sounds: Normal breath sounds.   Abdominal:      General: Bowel sounds are normal.      Palpations: Abdomen is soft.      Tenderness: There is no abdominal tenderness.   Musculoskeletal:         General: Normal range of motion.      Cervical back: Normal range of motion and neck supple.   Skin:     General: Skin is warm and dry.   Neurological:      Mental Status: He is alert and oriented to person, place, and time.      Cranial Nerves: No cranial nerve deficit.      Motor: No abnormal muscle tone.   Psychiatric:         Behavior: Behavior normal.         Thought Content: Thought content normal.     Elderly  Chronically ill  Muscle atrophy.   No pain  in hips on movement in bed    Results Review:  I reviewed the patient's new clinical results.      Lab Results (last 24 hours)       Procedure Component Value Units Date/Time    CBC & Differential [[id number]]  (Abnormal) Collected: 04/12/25 0101    Specimen: Blood from Arm, Left Updated: 04/12/25 0113    Narrative:      The following orders were created for panel order CBC & Differential.  Procedure                               Abnormality         Status                     ---------                               -----------         ------                     CBC Auto Differential[[id number]]        Abnormal            Final result                 Please view results for these tests on the individual orders.    Comprehensive Metabolic Panel [[id number]]  (Abnormal) Collected: 04/12/25 0101    Specimen: Blood from Arm, Left Updated: 04/12/25 0134     Glucose 227 mg/dL      BUN 24 mg/dL      Creatinine 2.86 mg/dL      Sodium 138 mmol/L      Potassium 3.4 mmol/L      Chloride 103 mmol/L      CO2 24.4 mmol/L      Calcium 8.7 mg/dL      Total Protein 6.8 g/dL      Albumin 3.5 g/dL      ALT (SGPT) 22 U/L      AST (SGOT) 21 U/L      Alkaline Phosphatase 143 U/L      Total Bilirubin 0.3 mg/dL      Globulin 3.3 gm/dL      A/G Ratio 1.1 g/dL      BUN/Creatinine Ratio 8.4     Anion Gap 10.6 mmol/L      eGFR 23.1 mL/min/1.73     Narrative:      GFR Categories in Chronic Kidney Disease (CKD)      GFR Category          GFR (mL/min/1.73)    Interpretation  G1                     90 or greater         Normal or high (1)  G2                      60-89                Mild decrease (1)  G3a                   45-59                Mild to moderate decrease  G3b                   30-44                Moderate to severe decrease  G4                    15-29                Severe decrease  G5                    14 or less           Kidney failure          (1)In the absence of evidence of kidney disease, neither GFR category G1 or G2 fulfill  the criteria for CKD.    eGFR calculation 2021 CKD-EPI creatinine equation, which does not include race as a factor    Magnesium [[id number]]  (Normal) Collected: 04/12/25 0101    Specimen: Blood from Arm, Left Updated: 04/12/25 0134     Magnesium 1.7 mg/dL     Phosphorus [[id number]]  (Abnormal) Collected: 04/12/25 0101    Specimen: Blood from Arm, Left Updated: 04/12/25 0146     Phosphorus 1.7 mg/dL     CBC Auto Differential [[id number]]  (Abnormal) Collected: 04/12/25 0101    Specimen: Blood from Arm, Left Updated: 04/12/25 0113     WBC 4.64 10*3/mm3      RBC 3.08 10*6/mm3      Hemoglobin 8.4 g/dL      Hematocrit 27.5 %      MCV 89.3 fL      MCH 27.3 pg      MCHC 30.5 g/dL      RDW 14.5 %      RDW-SD 45.9 fl      MPV 9.6 fL      Platelets 258 10*3/mm3      Neutrophil % 66.9 %      Lymphocyte % 11.6 %      Monocyte % 13.8 %      Eosinophil % 7.1 %      Basophil % 0.0 %      Immature Grans % 0.6 %      Neutrophils, Absolute 3.10 10*3/mm3      Lymphocytes, Absolute 0.54 10*3/mm3      Monocytes, Absolute 0.64 10*3/mm3      Eosinophils, Absolute 0.33 10*3/mm3      Basophils, Absolute 0.00 10*3/mm3      Immature Grans, Absolute 0.03 10*3/mm3      nRBC 0.0 /100 WBC     POC Glucose Once [[id number]]  (Abnormal) Collected: 04/12/25 0831    Specimen: Blood Updated: 04/12/25 0832     Glucose 229 mg/dL             Imaging Results (Last 24 Hours)       Procedure Component Value Units Date/Time    CT Cervical Spine Without Contrast [[id number]] Collected: 04/12/25 0155     Updated: 04/12/25 0200    Narrative:      CT OF THE CERVICAL SPINE     HISTORY: Fall     COMPARISON: None available.     TECHNIQUE: Axial CT imaging was obtained through the cervical spine.  Coronal and sagittal reformatted images were obtained.     FINDINGS:  No acute fracture or subluxation of the cervical spine is seen. There is  mild retrolisthesis of C4 on C5. Intervertebral disc space narrowing is  most significant at C6-C7. There is no prevertebral soft  tissue  swelling. Images through the lung apices are clear. There are carotid  calcifications. Canal stenosis is probably most significant at C4-C5 and  C5-C6. Neural foraminal narrowing is noted at multiple levels, but is  probably most significant at C4-C5 and C5-C6 on the right.       Impression:      No acute fracture or subluxation identified.     Radiation dose reduction techniques were utilized, including automated  exposure control and exposure modulation based on body size.        This report was finalized on 4/12/2025 1:57 AM by Dr. Alice Allen M.D on Workstation: [id number]       CT Head Without Contrast [[id number]] Collected: 04/12/25 0140     Updated: 04/12/25 0158    Narrative:      CT OF THE HEAD WITHOUT CONTRAST     HISTORY: Fall     COMPARISON: March 16, 2025     TECHNIQUE: Axial CT imaging was obtained through the brain. No IV  contrast was administered.     FINDINGS:  No acute intracranial hemorrhage is seen. There is diffuse atrophy.  There is periventricular and deep white matter microangiopathic change.  The atrophy is relatively advanced for the age of 69. There is an old  infarct noted within the right cerebellar hemisphere. No calvarial  fracture is seen. Large mucous retention cyst is noted within the right  maxillary sinus. There is some mucosal thickening within the ethmoid  sinuses. There is a left parietal scalp lesion, which is likely a  sebaceous cyst. Additional lesion is noted within the occipital region.       Impression:      No acute intracranial findings.     Radiation dose reduction techniques were utilized, including automated  exposure control and exposure modulation based on body size.        This report was finalized on 4/12/2025 1:55 AM by Dr. Alice Allen M.D on Workstation: [id number]               Results for orders placed during the hospital encounter of 03/10/25    Adult Transthoracic Echo Limited W/ Cont if Necessary Per Protocol    Interpretation  Summary  •  Limited study.  •  Left ventricular systolic function is mild-moderately decreased. Left ventricular ejection fraction appears to be 35-40% with [id number] variation in the setting of A-fib.  •  Left ventricular wall thickness is consistent with hypertrophy. Sigmoid-shaped ventricular septum is present.  •  The following left ventricular wall segments are akinetic: mid anterior, apical inferior, apical septal and apex.  •  Left ventricular diastolic function was indeterminate.  •  Estimated right ventricular systolic pressure from tricuspid regurgitation is normal (<35 mmHg).  •  IVC borderline dilated.      ECG 12 Lead Other; falls   Preliminary Result   HEART RATE=59  bpm   RR Zepkogjn=8956  ms   MT Welonvqw=199  ms   P Horizontal Axis=266  deg   P Front Axis=0  deg   QRSD Fanhtgjo=167  ms   QT Oytgecil=955  ms   IGzM=097  ms   QRS Axis=-74  deg   T Wave Axis=40  deg   - ABNORMAL ECG -   Sinus rhythm   Prolonged MT interval   Right bundle branch block   Anteroseptal infarct, age indeterminate   Date and Time of Study:2025-04-12 00:56:20      Telemetry Scan   Final Result      Telemetry Scan   Final Result           Assessment/Plan     Active Hospital Problems    Diagnosis  POA   • **Frequent falls [R29.6]  Not Applicable   • Chronic HFrEF (heart failure with reduced ejection fraction) [I50.22]  Yes   • ESRD (end stage renal disease) [N18.6]  Yes   • History of stroke [Z86.73]  Not Applicable   • Paroxysmal atrial fibrillation [I48.0]  Yes   • Coronary artery disease involving native coronary artery of native heart without angina pectoris [I25.10]  Yes   • Benign prostatic hyperplasia with nocturia [N40.1, R35.1]  Yes     0-1x/night    Overview:   Overview:   0-1x/night     • Type 2 diabetes mellitus, with long-term current use of insulin [E11.9, Z79.4]  Not Applicable     Patient is a 69 y.o. male ESRD on dialysis, CAD, Afib and h/o stroke on eliquis. He was seen last month here for aflutter,  hypotension on midodrine, PNA and was discharged from here to rehab.    Discharged from rehab yesterday, had a fall at home. Imaging negative in the ER.    PT/OT/CCP consult  Sliding scale insulin  Resume home asa and eliquis  Home midodrine  Nephrology consultation  Monitor chronic anemia  K/phos replaced, monitor   I discussed the patients findings and my recommendations with patient.    VTE Prophylaxis - sam Hogue MD  [facility]  04/12/25  08:37 EDT

## 2025-04-12 NOTE — PLAN OF CARE
Goal Outcome Evaluation:  Plan of Care Reviewed With: patient        Progress: improving  Outcome Evaluation: Pt has low blood sugar around 12 pm and was treated with d50 after recheck was normal (he no take his breakfast).We try to take his orthostatic blood pressure but after second he feel so bad and he think he is to cristin pass out and we put back on bed,nephrology was notified.Plan of care on going.

## 2025-04-12 NOTE — ED NOTES
"Nursing report ED to floor  Carlito Mo  69 y.o.  male    HPI :  HPI  Stated Reason for Visit: patient from home, was discharged from [facility], patient had mechanical fall after arriving home tonight, no injuries. patient reports generalized weakness.  History Obtained From: patient    Chief Complaint  Chief Complaint   Patient presents with    Fall       Admitting doctor:   Delmar Tse MD    Admitting diagnosis:   The primary encounter diagnosis was Frequent falls. Diagnoses of Closed head injury, initial encounter, Chronic anticoagulation, Hypophosphatemia, Hypokalemia, and Multifactorial functional impairment were also pertinent to this visit.    Code status:   Current Code Status       Date Active Code Status Order ID Comments User Context       Prior            Allergies:   Prozac [fluoxetine hcl]    Isolation:   No active isolations    Intake and Output  No intake or output data in the 24 hours ending 04/12/25 0336    Weight:       04/12/25  0047   Weight: 63.5 kg (140 lb)       Most recent vitals:   Vitals:    04/12/25 0047 04/12/25 0101 04/12/25 0201 04/12/25 0301   BP:  122/58 115/56 123/59   Pulse:  60 59 57   Resp:       Temp:       TempSrc:       SpO2:  100% 95% 98%   Weight: 63.5 kg (140 lb)      Height: 182.9 cm (72\")          Active LDAs/IV Access:   Lines, Drains & Airways       Active LDAs       None                    Labs (abnormal labs have a star):   Labs Reviewed   COMPREHENSIVE METABOLIC PANEL - Abnormal; Notable for the following components:       Result Value    Glucose 227 (*)     BUN 24 (*)     Creatinine 2.86 (*)     Potassium 3.4 (*)     Alkaline Phosphatase 143 (*)     eGFR 23.1 (*)     All other components within normal limits    Narrative:     GFR Categories in Chronic Kidney Disease (CKD)      GFR Category          GFR (mL/min/1.73)    Interpretation  G1                     90 or greater         Normal or high (1)  G2                      60-89                Mild " decrease (1)  G3a                   45-59                Mild to moderate decrease  G3b                   30-44                Moderate to severe decrease  G4                    15-29                Severe decrease  G5                    14 or less           Kidney failure          (1)In the absence of evidence of kidney disease, neither GFR category G1 or G2 fulfill the criteria for CKD.    eGFR calculation 2021 CKD-EPI creatinine equation, which does not include race as a factor   PHOSPHORUS - Abnormal; Notable for the following components:    Phosphorus 1.7 (*)     All other components within normal limits   CBC WITH AUTO DIFFERENTIAL - Abnormal; Notable for the following components:    RBC 3.08 (*)     Hemoglobin 8.4 (*)     Hematocrit 27.5 (*)     MCHC 30.5 (*)     Lymphocyte % 11.6 (*)     Monocyte % 13.8 (*)     Eosinophil % 7.1 (*)     Immature Grans % 0.6 (*)     Lymphocytes, Absolute 0.54 (*)     All other components within normal limits   MAGNESIUM - Normal   RAINBOW DRAW    Narrative:     The following orders were created for panel order [facility] Draw.  Procedure                               Abnormality         Status                     ---------                               -----------         ------                     Light Blue Top[[id number]]                                   Final result                 Please view results for these tests on the individual orders.   CBC AND DIFFERENTIAL    Narrative:     The following orders were created for panel order CBC & Differential.  Procedure                               Abnormality         Status                     ---------                               -----------         ------                     CBC Auto Differential[[id number]]        Abnormal            Final result                 Please view results for these tests on the individual orders.   LIGHT BLUE TOP       EKG:   ECG 12 Lead Other; falls   Preliminary Result   HEART RATE=59  bpm   RR  Iyarcxui=3704  ms   MO Oxfhviaz=725  ms   P Horizontal Axis=266  deg   P Front Axis=0  deg   QRSD Jzffsbfb=340  ms   QT Oxsduymq=664  ms   XDnG=909  ms   QRS Axis=-74  deg   T Wave Axis=40  deg   - ABNORMAL ECG -   Sinus rhythm   Prolonged MO interval   Right bundle branch block   Anteroseptal infarct, age indeterminate   Date and Time of Study:2025-04-12 00:56:20          Meds given in ED:   Medications   phosphorus (K PHOS NEUTRAL) tablet 2 tablet (2 tablets Oral Given 4/12/25 0308)       Imaging results:  CT Cervical Spine Without Contrast  Result Date: 4/12/2025  No acute fracture or subluxation identified.  Radiation dose reduction techniques were utilized, including automated exposure control and exposure modulation based on body size.   This report was finalized on 4/12/2025 1:57 AM by Dr. Alice Allen M.D on Workstation: [email or web address]      CT Head Without Contrast  Result Date: 4/12/2025  No acute intracranial findings.  Radiation dose reduction techniques were utilized, including automated exposure control and exposure modulation based on body size.   This report was finalized on 4/12/2025 1:55 AM by Dr. Alice Allen M.D on Workstation: [email or web address]        Ambulatory status:   - asst 1 with walker    Social issues:   Social History     Socioeconomic History    Marital status:      Spouse name: Lissette    Number of children: 3    Years of education: college   Tobacco Use    Smoking status: Never    Smokeless tobacco: Never    Tobacco comments:     CAFFEINE - OCCAS. SODA   Vaping Use    Vaping status: Never Used   Substance and Sexual Activity    Alcohol use: No     Comment: last drink 2 months ago    Drug use: No    Sexual activity: Defer       Peripheral Neurovascular       Neuro Cognitive  Neuro Cognitive (Adult)  Cognitive/Neuro/Behavioral WDL: WDL, mood/behavior, orientation  Orientation: oriented x 4  Mood/Behavior: calm, cooperative    Learning  Learning Assessment  Learning Readiness and  Ability: no barriers identified  Education Provided  Person Taught: patient  Teaching Method: verbal instruction  Education Outcome Evaluation: verbalizes understanding    Respiratory  Respiratory  Airway WDL: WDL  Respiratory WDL  Respiratory WDL: WDL    Abdominal Pain       Pain Assessments  Pain (Adult)  (0-10) Pain Rating: Rest: 2  Pain Location: head    NIH Stroke Scale       Liana Barnard RN  04/12/25 03:36 EDT

## 2025-04-12 NOTE — CASE MANAGEMENT/SOCIAL WORK
Continued Stay Note  [facility]     Patient Name: Carlito Mo  MRN: [id number]  Today's Date: 4/12/2025    Admit Date: 4/12/2025        Discharge Plan       Row Name 04/12/25 1150       Plan    Plan Comments Received word from MD pt likely ready for discharge back to facility or would need discharge planning. Pt recently admitted to [facility], referral sent/pending in case this is the plan. Pt would need precert to return. LVM for pt wife Lissette to discuss plan will await return call to complete screening and continue dc planning. - Amanda BERNAL                   Discharge Codes    No documentation.                       Amanda Mosqueda RN

## 2025-04-12 NOTE — THERAPY EVALUATION
Patient Name: Carlito Mo  : 1955    MRN: [id number]                              Today's Date: 2025       Admit Date: 2025    Visit Dx:     ICD-10-CM ICD-9-CM   1. Frequent falls  R29.6 V15.88   2. Closed head injury, initial encounter  S09.90XA 959.01   3. Chronic anticoagulation  Z79.01 V58.61   4. Hypophosphatemia  E83.39 275.3   5. Hypokalemia  E87.6 276.8   6. Multifactorial functional impairment  R53.81 799.3     Patient Active Problem List   Diagnosis    Major depressive disorder with single episode, in partial remission    Other hyperlipidemia    Type 2 diabetes mellitus, with long-term current use of insulin    Vitamin D deficiency    Erectile dysfunction    Chronic fatigue    Type 2 diabetes mellitus with ophthalmic complication    Benign prostatic hyperplasia with nocturia    History of basal cell carcinoma    Acquired hypothyroidism    Recurrent strokes    Suspected sleep apnea    YAW (obstructive sleep apnea)    Coronary artery disease involving native coronary artery of native heart without angina pectoris    Chronically elevated troponin    Colon cancer screening    Enlarged lymph nodes    Functional gait abnormality    History of myocardial infarction    Insomnia    Iron deficiency anemia    Major depression, recurrent    Essential hypertension    Acute on chronic renal insufficiency    Falls frequently    Acute on chronic anemia    Neurogenic orthostatic hypotension    Anemia, chronic disease    History of colon polyps    Helicobacter pylori gastritis    Esophagitis    Embolic stroke    Patent foramen ovale    Cardiomyopathy    Diarrhea    Generalized weakness    Paroxysmal atrial fibrillation    Chronic anticoagulation    Acute ischemic stroke    History of stroke    Iron deficiency anemia    Acute HFrEF (heart failure with reduced ejection fraction)    ESRD (end stage renal disease)    Hemoptysis    Chronic HFrEF (heart failure with reduced ejection fraction)    Hemodialysis  patient    Exertional dyspnea    Severe malnutrition    Ataxia    Cerebellar stroke, acute    Medically noncompliant    Vertebral artery stenosis, bilateral    Carotid stenosis, right    Ileus    Frequent falls     Past Medical History:   Diagnosis Date    Acquired hypothyroidism     Acute sinusitis     SYLVAIN (acute kidney injury)     on CKD    Anemia     Arthritis     Atrial fibrillation     CAD (coronary artery disease)     Chronic combined systolic and diastolic congestive heart failure     COPD (chronic obstructive pulmonary disease)     Depression     Diabetic neuropathy 04/11/2022    Diabetes mellitus due to underlying condition    Esophagitis 06/10/2020    Added automatically from request for surgery [id number]      ESRD (end stage renal disease) 12/01/2023    Frequent PVCs     Generalized weakness     GERD (gastroesophageal reflux disease)     Helicobacter pylori gastritis 06/04/2020    Hyperlipidemia     Hypertension     Hypertensive encephalopathy     Pleural effusion     Pneumonia     Poor historian     RBBB (right bundle branch block)     Stroke     POOR VISION    TIA (transient ischemic attack)     Type 2 diabetes mellitus     Urinary retention     Vitamin D deficiency      Past Surgical History:   Procedure Laterality Date    APPENDECTOMY N/A 02/14/2016    Dr. Alexey Dodson    ARTERIOVENOUS FISTULA/SHUNT SURGERY Right 06/03/2022    Procedure: RIGHT FOREARM ARTERIOVENOUS FISTULA PLACEMENT RADIOCEPHALIC WITH CEPHALIC VEIN TRANSPOSITION;  Surgeon: Eliseo Willis MD;  Location: [facility] MAIN OR;  Service: Vascular;  Laterality: Right;    COLONOSCOPY      over 20 years ago.  no polyps     COLONOSCOPY N/A 09/18/2018    Procedure: COLONOSCOPY;  Surgeon: Jose Enrique Louie MD;  Location: [facility] ENDOSCOPY;  Service: Gastroenterology    COLONOSCOPY N/A 09/19/2018    IH, EH, polyps (TAs w/low grade dysplasia)    COLONOSCOPY N/A 06/01/2020    Procedure: COLONOSCOPY;  Surgeon: Jose Enrique Louie MD;  Location:   [facility];  Service: Gastroenterology;  Laterality: N/A;  Pre op-Anemia, Melena, History of Polyps  Post op-To Cecum/TI, Polyp, Poor Prep    CORONARY ARTERY BYPASS GRAFT  11/2001    ENDOSCOPY N/A 05/29/2020    Procedure: ESOPHAGOGASTRODUODENOSCOPY with biopsies;  Surgeon: Amanda Lovelace MD;  Location:  [facility];  Service: Gastroenterology;  Laterality: N/A;  pre-anemia, dark stools  post-esophagitis, hiatal hernia    ENDOSCOPY N/A 09/15/2020    Procedure: ESOPHAGOGASTRODUODENOSCOPY WITH BIOPSIES;  Surgeon: Jose Enrique Louie MD;  Location:  [facility];  Service: Gastroenterology;  Laterality: N/A;  pre: history of melena and esophagitis  post: mild esophagitis and gastritis, small hiatal hernia    ENDOSCOPY N/A 12/4/2023    Procedure: ESOPHAGOGASTRODUODENOSCOPY with bx;  Surgeon: Quoc Elmore MD;  Location:  [facility];  Service: Gastroenterology;  Laterality: N/A;  pre: Coffee-ground emesis  post: hiatal hernia, esophagitis    HERNIA REPAIR Left 12/05/2019    THORACENTESIS      TONSILLECTOMY      VASECTOMY        General Information       Row Name 04/12/25 1326          OT Time and Intention    Subjective Information complains of;weakness;fatigue  -KG     Document Type evaluation  -KG     Mode of Treatment individual therapy;occupational therapy  -KG     Patient Effort good  -KG     Symptoms Noted During/After Treatment fatigue;dizziness  -KG       Row Name 04/12/25 5009          General Information    Patient Profile Reviewed yes  -KG     Prior Level of Function --  Pt inconsistently reporting baseline. Recently d/c home from [facility], reports he was home 3 hours before he fell. Initially states he was performing transfers only, then reports he was ambulating w/ RW. Wife assists w/ ADLs.  -KG     Existing Precautions/Restrictions fall  -KG     Barriers to Rehab previous functional deficit  -KG       Row Name 04/12/25 2134          Living Environment    Current Living Arrangements  home  tri level  -KG     People in Home spouse  -KG       Row Name 04/12/25 1326          Cognition    Orientation Status (Cognition) oriented x 3  -KG       Row Name 04/12/25 1326          Safety Issues/Impairments Affecting Functional Mobility    Impairments Affecting Function (Mobility) balance;endurance/activity tolerance;pain;strength;range of motion (ROM);postural/trunk control  -KG               User Key  (r) = Recorded By, (t) = Taken By, (c) = Cosigned By      Initials Name Provider Type    Manolo Natarajan OT Occupational Therapist                     Mobility/ADL's       Row Name 04/12/25 1327          Bed Mobility    Bed Mobility supine-sit  -KG     Supine-Sit [place] (Bed Mobility) standby assist  -KG       Row Name 04/12/25 1327          Transfers    Transfers sit-stand transfer;stand-sit transfer  -KG       Row Name 04/12/25 1327          Sit-Stand Transfer    Sit-Stand Mallard (Transfers) minimum assist (75% patient effort)  -KG     Assistive Device (Sit-Stand Transfers) walker, front-wheeled  -KG       Row Name 04/12/25 1327          Stand-Sit Transfer    Stand-Sit Mallard (Transfers) contact guard  -KG     Assistive Device (Stand-Sit Transfers) walker, front-wheeled  -KG       Row Name 04/12/25 1327          Functional Mobility    Functional Mobility- Ind. Level contact guard assist  ~3 steps from EOB to recliner  -KG               User Key  (r) = Recorded By, (t) = Taken By, (c) = Cosigned By      Initials Name Provider Type    Manolo Natarajan OT Occupational Therapist                   Obj/Interventions       Row Name 04/12/25 1328          Sensory Assessment (Somatosensory)    Sensory Assessment (Somatosensory) sensation intact  -KG       Row Name 04/12/25 1328          Vision Assessment/Intervention    Visual Impairment/Limitations WNL  -KG       Row Name 04/12/25 1328          Range of Motion Comprehensive    General Range of Motion bilateral upper extremity ROM WFL  -KG        Row Name 04/12/25 1328          Strength Comprehensive (MMT)    Comment, General Manual Muscle Testing (MMT) Assessment BUE strength 3+/5 grossly  -KG       Row Name 04/12/25 1328          Motor Skills    Motor Skills functional endurance  -KG     Functional Endurance fair/fair -  -KG       Row Name 04/12/25 1328          Balance    Balance Assessment sitting static balance;sitting dynamic balance;sit to stand dynamic balance;standing static balance;standing dynamic balance  -KG     Static Sitting Balance standby assist  -KG     Dynamic Sitting Balance standby assist  -KG     Position, Sitting Balance sitting edge of bed  -KG     Sit to Stand Dynamic Balance minimal assist  -KG     Static Standing Balance contact guard  -KG     Dynamic Standing Balance contact guard  -KG     Position/Device Used, Standing Balance walker, front-wheeled  -KG     Balance Interventions sitting;standing;sit to stand;supported;static;dynamic  -KG               User Key  (r) = Recorded By, (t) = Taken By, (c) = Cosigned By      Initials Name Provider Type    KG Manolo Jaimes, OT Occupational Therapist                   Goals/Plan       Row Name 04/12/25 1334          Bed Mobility Goal 1 (OT)    Activity/Assistive Device (Bed Mobility Goal 1, OT) sit to supine;supine to sit  -KG     [place] Level/Cues Needed (Bed Mobility Goal 1, OT) standby assist  -KG     Time Frame (Bed Mobility Goal 1, OT) short term goal (STG);2 weeks  -KG     Progress/Outcomes (Bed Mobility Goal 1, OT) new goal  -KG       Row Name 04/12/25 3244          Transfer Goal 1 (OT)    Activity/Assistive Device (Transfer Goal 1, OT) sit-to-stand/stand-to-sit;bed-to-chair/chair-to-bed;toilet  -KG     [place] Level/Cues Needed (Transfer Goal 1, OT) standby assist  -KG     Time Frame (Transfer Goal 1, OT) short term goal (STG);2 weeks  -KG     Progress/Outcome (Transfer Goal 1, OT) new goal  -KG       Row Name 04/12/25 1334          Bathing Goal 1 (OT)     Activity/Device (Bathing Goal 1, OT) upper body bathing;lower body bathing  -KG     [place] Level/Cues Needed (Bathing Goal 1, OT) standby assist  -KG     Time Frame (Bathing Goal 1, OT) short term goal (STG);2 weeks  -KG     Progress/Outcomes (Bathing Goal 1, OT) new goal  -KG       Row Name 04/12/25 1334          Dressing Goal 1 (OT)    Activity/Device (Dressing Goal 1, OT) upper body dressing;lower body dressing  -KG     [place]/Cues Needed (Dressing Goal 1, OT) standby assist  -KG     Time Frame (Dressing Goal 1, OT) short term goal (STG);2 weeks  -KG     Progress/Outcome (Dressing Goal 1, OT) new goal  -KG       Row Name 04/12/25 1334          Toileting Goal 1 (OT)    Activity/Device (Toileting Goal 1, OT) adjust/manage clothing;perform perineal hygiene  -KG     [place] Level/Cues Needed (Toileting Goal 1, OT) standby assist  -KG     Time Frame (Toileting Goal 1, OT) short term goal (STG);2 weeks  -KG     Progress/Outcome (Toileting Goal 1, OT) new goal  -KG       Row Name 04/12/25 1334          Grooming Goal 1 (OT)    Activity/Device (Grooming Goal 1, OT) oral care;wash face, hands  -KG     [place] (Grooming Goal 1, OT) standby assist  -KG     Time Frame (Grooming Goal 1, OT) short term goal (STG);2 weeks  -KG     Progress/Outcome (Grooming Goal 1, OT) new goal  -KG       Row Name 04/12/25 1339          Therapy Assessment/Plan (OT)    Planned Therapy Interventions (OT) activity tolerance training;adaptive equipment training;BADL retraining;functional balance retraining;occupation/activity based interventions;passive ROM/stretching;patient/caregiver education/training;ROM/therapeutic exercise;transfer/mobility retraining;strengthening exercise  -KG               User Key  (r) = Recorded By, (t) = Taken By, (c) = Cosigned By      Initials Name Provider Type    Manolo Natarajan, OT Occupational Therapist                   Clinical Impression       Row Name 04/12/25 1321          Pain  Assessment    Pretreatment Pain Rating 0/10 - no pain  -KG     Posttreatment Pain Rating 0/10 - no pain  -KG       Row Name 04/12/25 1329          Plan of Care Review    Plan of Care Reviewed With patient  -KG     Outcome Evaluation Pt admitted to [facility] for frequent falls. Pt recently admitted last month for aflutter, hypotension, PNA and was discharged from here to [facility]. Pt reports being home for 3 hours before falling and returning to the ED. Pt inconsistently reporting PLOF -- initially states he was performing transfers only upon return home, then later reports ambulating w/ RW. Wife assists w/ ADLs. Today, pt was SBA for bed mobility. Needing Min A for STS and CGA to step to recliner w/ RW. Further activity limited due to dizziness and fatigue. OT will continue to follow to address strength, balance, and activity tolerance deficits while inpatient.  Recommend SNF at this time.  -KG       Row Name 04/12/25 1329          Therapy Assessment/Plan (OT)    Rehab Potential (OT) fair  -KG     Criteria for Skilled Therapeutic Interventions Met (OT) skilled treatment is necessary  -KG     Therapy Frequency (OT) 5 times/wk  -KG       Row Name 04/12/25 1329          Therapy Plan Review/Discharge Plan (OT)    Anticipated Discharge Disposition (OT) skilled nursing facility  -KG       Row Name 04/12/25 1329          Vital Signs    Pre Patient Position Supine  -KG     Intra Patient Position Standing  -KG     Post Patient Position Sitting  -KG       Row Name 04/12/25 1329          Positioning and Restraints    Pre-Treatment Position in bed  -KG     Post Treatment Position chair  -KG     In Chair notified nsg;reclined;encouraged to call for assist;call light within reach;exit alarm on  -KG               User Key  (r) = Recorded By, (t) = Taken By, (c) = Cosigned By      Initials Name Provider Type    KG Manolo Jaimes, OT Occupational Therapist                   Outcome Measures       Row Name 04/12/25 4913          How much help  from another is currently needed...    Putting on and taking off regular lower body clothing? 2  -KG     Bathing (including washing, rinsing, and drying) 2  -KG     Toileting (which includes using toilet bed pan or urinal) 2  -KG     Putting on and taking off regular upper body clothing 3  -KG     Taking care of personal grooming (such as brushing teeth) 3  -KG     Eating meals 4  -KG     AM-PAC 6 Clicks Score (OT) 16  -KG       Row Name 04/12/25 0613          How much help from another person do you currently need...    Turning from your back to your side while in flat bed without using bedrails? 2  -SB     Moving from lying on back to sitting on the side of a flat bed without bedrails? 3  -SB     Moving to and from a bed to a chair (including a wheelchair)? 3  -SB     Standing up from a chair using your arms (e.g., wheelchair, bedside chair)? 3  -SB     Climbing 3-5 steps with a railing? 3  -SB     To walk in hospital room? 3  -SB     AM-PAC 6 Clicks Score (PT) 17  -SB       Row Name 04/12/25 1335          Modified [place] Scale    Modified [place] Scale 4 - Moderately severe disability.  Unable to walk without assistance, and unable to attend to own bodily needs without assistance.  -KG       Row Name 04/12/25 1335          Functional Assessment    Outcome Measure Options AM-PAC 6 Clicks Daily Activity (OT);Modified [place]  -KG               User Key  (r) = Recorded By, (t) = Taken By, (c) = Cosigned By      Initials Name Provider Type    Remedios Banks, RN Registered Nurse    Manolo Natarajan OT Occupational Therapist                      OT Recommendation and Plan  Planned Therapy Interventions (OT): activity tolerance training, adaptive equipment training, BADL retraining, functional balance retraining, occupation/activity based interventions, passive ROM/stretching, patient/caregiver education/training, ROM/therapeutic exercise, transfer/mobility retraining, strengthening exercise  Therapy Frequency (OT):  5 times/wk  Plan of Care Review  Plan of Care Reviewed With: patient  Outcome Evaluation: Pt admitted to [facility] for frequent falls. Pt recently admitted last month for aflutter, hypotension, PNA and was discharged from here to [facility]. Pt reports being home for 3 hours before falling and returning to the ED. Pt inconsistently reporting PLOF -- initially states he was performing transfers only upon return home, then later reports ambulating w/ RW. Wife assists w/ ADLs. Today, pt was SBA for bed mobility. Needing Min A for STS and CGA to step to recliner w/ RW. Further activity limited due to dizziness and fatigue. OT will continue to follow to address strength, balance, and activity tolerance deficits while inpatient.  Recommend SNF at this time.     Time Calculation:   Evaluation Complexity (OT)  Review Occupational Profile/Medical/Therapy History Complexity: expanded/moderate complexity  Assessment, Occupational Performance/Identification of Deficit Complexity: 3-5 performance deficits  Clinical Decision Making Complexity (OT): detailed assessment/moderate complexity  Overall Complexity of Evaluation (OT): moderate complexity     Time Calculation- OT       Row Name 04/12/25 1335             Time Calculation- OT    OT Start Time 1255  -KG      OT Stop Time 1318  -KG      OT Time Calculation (min) 23 min  -KG      Total Timed Code Minutes- OT 13 minute(s)  -KG      OT Received On 04/12/25  -KG      OT - Next Appointment 04/14/25  -KG      OT Goal Re-Cert Due Date 04/26/25  -KG         Timed Charges    61950 - OT Self Care/Mgmt Minutes 13  -KG         Untimed Charges    OT Eval/Re-eval Minutes 10  -KG         Total Minutes    Timed Charges Total Minutes 13  -KG      Untimed Charges Total Minutes 10  -KG       Total Minutes 23  -KG                User Key  (r) = Recorded By, (t) = Taken By, (c) = Cosigned By      Initials Name Provider Type    KG Manolo Jaimes, OT Occupational Therapist                  Therapy Charges for  Today       Code Description Service Date Service Provider Modifiers Qty    94488989040 HC OT SELF CARE/MGMT/TRAIN EA 15 MIN 4/12/2025 Manolo Jaimes OT GO 1    26412924440 HC OT EVAL MOD COMPLEXITY 2 4/12/2025 Manolo Jaimes OT GO 1                 Manolo Jaimes OT  4/12/2025

## 2025-04-12 NOTE — PLAN OF CARE
Goal Outcome Evaluation:  Plan of Care Reviewed With: patient        Progress: no change  Outcome Evaluation: Pt is a 69 y.o. male admitted to [facility] due to a fall on 4/12/2025. Pt was just recently d/c from acute rehab. 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 Prior to hospital admission, pt reports residing at home after recently being discharged from acute rehab. He states his wife is too weak to assist much at home but he is not independent. At home he has 3 GREG his house and reports an additional 9 to his bedroom. Seated in recliner upon entry, required [place] for STS, [place] for ambulation w/ RWx for 12ft but fatigued quickly and needed to return to bed. Pt was able to perform sit to supine w/ SBA. Pt will benefit from skilled PT to address the previous deficits and improve overall safety with functional mobility. Pt not safe to return home at this time. Anticipate pt will D/C to acute rehab pending progress.    Anticipated Discharge Disposition (PT): inpatient rehabilitation facility, extended care facility

## 2025-04-13 LAB
ALBUMIN SERPL-MCNC: 3.2 G/DL (ref 3.5–5.2)
ANION GAP SERPL CALCULATED.3IONS-SCNC: 11.2 MMOL/L (ref 5–15)
BUN SERPL-MCNC: 46 MG/DL (ref 8–23)
BUN/CREAT SERPL: 10.8 (ref 7–25)
CALCIUM SPEC-SCNC: 8.6 MG/DL (ref 8.6–10.5)
CHLORIDE SERPL-SCNC: 100 MMOL/L (ref 98–107)
CO2 SERPL-SCNC: 22.8 MMOL/L (ref 22–29)
CREAT SERPL-MCNC: 4.27 MG/DL (ref 0.76–1.27)
DEPRECATED RDW RBC AUTO: 45.7 FL (ref 37–54)
EGFRCR SERPLBLD CKD-EPI 2021: 14.3 ML/MIN/1.73
ERYTHROCYTE [DISTWIDTH] IN BLOOD BY AUTOMATED COUNT: 14.6 % (ref 12.3–15.4)
FERRITIN SERPL-MCNC: 786 NG/ML (ref 30–400)
GLUCOSE BLDC GLUCOMTR-MCNC: 150 MG/DL (ref 70–130)
GLUCOSE BLDC GLUCOMTR-MCNC: 157 MG/DL (ref 70–130)
GLUCOSE BLDC GLUCOMTR-MCNC: 196 MG/DL (ref 70–130)
GLUCOSE BLDC GLUCOMTR-MCNC: 324 MG/DL (ref 70–130)
GLUCOSE BLDC GLUCOMTR-MCNC: 60 MG/DL (ref 70–130)
GLUCOSE SERPL-MCNC: 108 MG/DL (ref 65–99)
HCT VFR BLD AUTO: 25.6 % (ref 37.5–51)
HGB BLD-MCNC: 8.1 G/DL (ref 13–17.7)
IRON 24H UR-MRATE: 18 MCG/DL (ref 59–158)
IRON SATN MFR SERPL: 8 % (ref 20–50)
MCH RBC QN AUTO: 27.4 PG (ref 26.6–33)
MCHC RBC AUTO-ENTMCNC: 31.6 G/DL (ref 31.5–35.7)
MCV RBC AUTO: 86.5 FL (ref 79–97)
PHOSPHATE SERPL-MCNC: 3.2 MG/DL (ref 2.5–4.5)
PLATELET # BLD AUTO: 255 10*3/MM3 (ref 140–450)
PMV BLD AUTO: 9.9 FL (ref 6–12)
POTASSIUM SERPL-SCNC: 3.7 MMOL/L (ref 3.5–5.2)
RBC # BLD AUTO: 2.96 10*6/MM3 (ref 4.14–5.8)
SODIUM SERPL-SCNC: 134 MMOL/L (ref 136–145)
TIBC SERPL-MCNC: 225 MCG/DL (ref 298–536)
TRANSFERRIN SERPL-MCNC: 151 MG/DL (ref 200–360)
WBC NRBC COR # BLD AUTO: 9.05 10*3/MM3 (ref 3.4–10.8)

## 2025-04-13 PROCEDURE — 63710000001 INSULIN LISPRO (HUMAN) PER 5 UNITS: Performed by: NURSE PRACTITIONER

## 2025-04-13 PROCEDURE — 83540 ASSAY OF IRON: CPT

## 2025-04-13 PROCEDURE — G0378 HOSPITAL OBSERVATION PER HR: HCPCS

## 2025-04-13 PROCEDURE — 82948 REAGENT STRIP/BLOOD GLUCOSE: CPT

## 2025-04-13 PROCEDURE — 82728 ASSAY OF FERRITIN: CPT

## 2025-04-13 PROCEDURE — 96376 TX/PRO/DX INJ SAME DRUG ADON: CPT

## 2025-04-13 PROCEDURE — 84466 ASSAY OF TRANSFERRIN: CPT

## 2025-04-13 PROCEDURE — 80069 RENAL FUNCTION PANEL: CPT

## 2025-04-13 PROCEDURE — 85027 COMPLETE CBC AUTOMATED: CPT | Performed by: INTERNAL MEDICINE

## 2025-04-13 RX ORDER — CETIRIZINE HYDROCHLORIDE 10 MG/1
10 TABLET ORAL DAILY
Status: DISCONTINUED | OUTPATIENT
Start: 2025-04-13 | End: 2025-04-19 | Stop reason: HOSPADM

## 2025-04-13 RX ORDER — FLUTICASONE PROPIONATE 50 MCG
2 SPRAY, SUSPENSION (ML) NASAL DAILY
Status: DISCONTINUED | OUTPATIENT
Start: 2025-04-13 | End: 2025-04-19 | Stop reason: HOSPADM

## 2025-04-13 RX ADMIN — CETIRIZINE HYDROCHLORIDE 10 MG: 10 TABLET, FILM COATED ORAL at 13:50

## 2025-04-13 RX ADMIN — Medication 5000 UNITS: at 08:52

## 2025-04-13 RX ADMIN — ASPIRIN 81 MG: 81 TABLET, COATED ORAL at 08:52

## 2025-04-13 RX ADMIN — Medication 10 ML: at 08:54

## 2025-04-13 RX ADMIN — FLUTICASONE PROPIONATE 2 SPRAY: 50 SPRAY, METERED NASAL at 13:50

## 2025-04-13 RX ADMIN — APIXABAN 2.5 MG: 2.5 TABLET, FILM COATED ORAL at 08:52

## 2025-04-13 RX ADMIN — INSULIN LISPRO 7 UNITS: 100 INJECTION, SOLUTION INTRAVENOUS; SUBCUTANEOUS at 11:06

## 2025-04-13 RX ADMIN — DEXTROSE MONOHYDRATE 25 G: 25 INJECTION, SOLUTION INTRAVENOUS at 16:18

## 2025-04-13 RX ADMIN — PANTOPRAZOLE SODIUM 40 MG: 40 TABLET, DELAYED RELEASE ORAL at 08:52

## 2025-04-13 RX ADMIN — MIDODRINE HYDROCHLORIDE 5 MG: 5 TABLET ORAL at 16:30

## 2025-04-13 RX ADMIN — MIDODRINE HYDROCHLORIDE 5 MG: 5 TABLET ORAL at 11:06

## 2025-04-13 RX ADMIN — INSULIN LISPRO 2 UNITS: 100 INJECTION, SOLUTION INTRAVENOUS; SUBCUTANEOUS at 08:51

## 2025-04-13 RX ADMIN — MIDODRINE HYDROCHLORIDE 5 MG: 5 TABLET ORAL at 08:51

## 2025-04-13 RX ADMIN — APIXABAN 2.5 MG: 2.5 TABLET, FILM COATED ORAL at 22:22

## 2025-04-13 RX ADMIN — PANTOPRAZOLE SODIUM 40 MG: 40 TABLET, DELAYED RELEASE ORAL at 16:30

## 2025-04-13 RX ADMIN — LEVOTHYROXINE SODIUM 75 MCG: 0.07 TABLET ORAL at 05:50

## 2025-04-13 RX ADMIN — ATORVASTATIN CALCIUM 80 MG: 80 TABLET, FILM COATED ORAL at 22:22

## 2025-04-13 RX ADMIN — AMIODARONE HYDROCHLORIDE 200 MG: 200 TABLET ORAL at 08:52

## 2025-04-13 RX ADMIN — Medication 10 ML: at 22:22

## 2025-04-13 NOTE — PLAN OF CARE
VSS, no complaints overnight. Plan of care continued.   Problem: Adult Inpatient Plan of Care  Goal: Plan of Care Review  Outcome: Progressing   Goal Outcome Evaluation:

## 2025-04-13 NOTE — PLAN OF CARE
Goal Outcome Evaluation:  Plan of Care Reviewed With: patient        Progress: improving  Outcome Evaluation: Palliative consult and hemodialysis for tomorrow.Pt have another episode of low blood sugar for the same reason yesterday (no eat) this time was 60,after D50 his blood sugar was 151 doctor Asim Hogue was notified.Plan of care ongoing.

## 2025-04-13 NOTE — PROGRESS NOTES
[facility] Progress Note      Patient Name: Carlito Mo  : 1955  MRN: [id number]  Primary Care Physician:  Belle Simons APRN  Date of admission: 2025    Subjective     Interval History:   F/U ESRD    Review of Systems:   Sleeping     Objective     Vitals:   Temp:  [98.1 °F (36.7 °C)-98.3 °F (36.8 °C)] 98.1 °F (36.7 °C)  Heart Rate:  [58-62] 62  Resp:  [18] 18  BP: (118-143)/(59-74) 118/74    Intake/Output Summary (Last 24 hours) at 2025 1356  Last data filed at 2025 2340  Gross per 24 hour   Intake 240 ml   Output 225 ml   Net 15 ml       Physical Exam:    General Appearance: frail WM sleeping comfortably.  I did not awaken him    Scheduled Meds:     amiodarone, 200 mg, Oral, Q24H  apixaban, 2.5 mg, Oral, BID  aspirin, 81 mg, Oral, Daily  atorvastatin, 80 mg, Oral, Nightly  cetirizine, 10 mg, Oral, Daily  cholecalciferol, 5,000 Units, Oral, Daily  fluticasone, 2 spray, Each Nare, Daily  insulin lispro, 2-9 Units, Subcutaneous, 4x Daily AC & at Bedtime  levothyroxine, 75 mcg, Oral, Q AM  midodrine, 5 mg, Oral, TID AC  pantoprazole, 40 mg, Oral, BID AC  sodium chloride, 10 mL, Intravenous, Q12H  [Held by provider] tamsulosin, 0.4 mg, Oral, Daily      IV Meds:        Results Reviewed:   I have personally reviewed the results from the time of this admission to 2025 13:56 EDT     Results from last 7 days   Lab Units 25  0448 25  0101   SODIUM mmol/L 134* 138   POTASSIUM mmol/L 3.7 3.4*   CHLORIDE mmol/L 100 103   CO2 mmol/L 22.8 24.4   BUN mg/dL 46* 24*   CREATININE mg/dL 4.27* 2.86*   CALCIUM mg/dL 8.6 8.7   BILIRUBIN mg/dL  --  0.3   ALK PHOS U/L  --  143*   ALT (SGPT) U/L  --  22   AST (SGOT) U/L  --  21   GLUCOSE mg/dL 108* 227*       Estimated Creatinine Clearance: 14.7 mL/min (A) (by C-G formula based on SCr of 4.27 mg/dL (H)).    Results from last 7 days   Lab Units 25  0448 25  0101   MAGNESIUM mg/dL  --  1.7   PHOSPHORUS mg/dL  3.2 1.7*       Results from last 7 days   Lab Units 04/12/25  1226   URIC ACID mg/dL 2.8*       Results from last 7 days   Lab Units 04/13/25  0448 04/12/25  0101   WBC 10*3/mm3 9.05 4.64   HEMOGLOBIN g/dL 8.1* 8.4*   PLATELETS 10*3/mm3 255 258             Assessment / Plan     ASSESSMENT:  ESRD on HD MWF via RUE AVG at [facility], dialyzed on THURS & FRI at rehab to get back in sync with MWF schedule.  Volume/lytes stable - low K/Phos repleted orally (and removed renal restriction from diet)  Hypotension with ESRD: On midodrine 5 TID, resting BP ok; too frail to even attempt orthostatics   Fall with generalized weakness.  Stopped flomax as it can cause orthostasis.  Diabetes mellitus type 2 with ESRD: A1c 7.1.  Mgmt per primary team  BPH: as above stopped flomax   Anemia of chronic disease: hgb down slightly 8.1.  TSAt low 8% with ferritin 786  Hyperlipidemia: On Lipitor     PLAN:  HD tomorrow  Give IV iron with dialysis  Continue midodrine  Hold flomax  Prognosis is poor and has had recurrent hospitalizations.  Agree with palliative care consultation for further [facility] discussions  D/W RN      Thank you for involving us in the care of Carlito JANINE Mo.  Please feel free to call with any questions.    Jose Enrique Montelongo MD  04/13/25  13:56 EDT    [facility]  [phone]    Parts of this note may be an electronic transcription/translation of spoken language to printed text using the Dragon dictation system.

## 2025-04-13 NOTE — PROGRESS NOTES
Name: Carlito Mo ADMIT: 2025   : 1955  PCP: Belle Simons APRN    MRN: [id number] LOS: 0 days   AGE/SEX: 69 y.o. male  ROOM: [facility]   Subjective   Chief Complaint   Patient presents with    Fall     Patient is a 69 y.o. male ESRD on dialysis, CAD, Afib and h/o stroke on eliquis. He was seen last month here for aflutter, hypotension on midodrine, PNA and was discharged from here to rehab.     Discharged from rehab, had a fall at home. Imaging negative in the ER.    Feels ok today  Some sinus drainage    ROS  No f/c  No n/v  No cp/palp  No soa/cough    Objective   Vital Signs  Temp:  [97.7 °F (36.5 °C)-98.3 °F (36.8 °C)] 98.1 °F (36.7 °C)  Heart Rate:  [58-70] 62  Resp:  [18-20] 18  BP: (101-143)/(51-74) 118/74  SpO2:  [98 %-100 %] 99 %  on   ;   Device (Oxygen Therapy): room air  Body mass index is 18.99 kg/m².    Physical Exam  HENT:      Head: Normocephalic and atraumatic.   Eyes:      General: No scleral icterus.  Cardiovascular:      Rate and Rhythm: Normal rate and regular rhythm.   Pulmonary:      Effort: Pulmonary effort is normal. No respiratory distress.   Abdominal:      General: There is no distension.      Palpations: Abdomen is soft.      Tenderness: There is no abdominal tenderness.   Musculoskeletal:      Cervical back: Neck supple.   Neurological:      Mental Status: He is alert.   Psychiatric:         Behavior: Behavior normal.     Chronically ill    Results Review:       I reviewed the patient's new clinical results.  Results from last 7 days   Lab Units 25  0448 25  0101   WBC 10*3/mm3 9.05 4.64   HEMOGLOBIN g/dL 8.1* 8.4*   PLATELETS 10*3/mm3 255 258     Results from last 7 days   Lab Units 25  0448 25  0101   SODIUM mmol/L 134* 138   POTASSIUM mmol/L 3.7 3.4*   CHLORIDE mmol/L 100 103   CO2 mmol/L 22.8 24.4   BUN mg/dL 46* 24*   CREATININE mg/dL 4.27* 2.86*   GLUCOSE mg/dL 108* 227*   Estimated Creatinine Clearance: 14.7 mL/min (A) (by C-G formula  "based on SCr of 4.27 mg/dL (H)).  Results from last 7 days   Lab Units 04/13/25  0448 04/12/25  0101   ALBUMIN g/dL 3.2* 3.5   BILIRUBIN mg/dL  --  0.3   ALK PHOS U/L  --  143*   AST (SGOT) U/L  --  21   ALT (SGPT) U/L  --  22     Results from last 7 days   Lab Units 04/13/25  0448 04/12/25  0101   CALCIUM mg/dL 8.6 8.7   ALBUMIN g/dL 3.2* 3.5   MAGNESIUM mg/dL  --  1.7   PHOSPHORUS mg/dL 3.2 1.7*         Coag     HbA1C   Lab Results   Component Value Date    HGBA1C 7.10 (H) 03/10/2025    HGBA1C 8 (H) 02/19/2025    HGBA1C 8.2 (H) 01/08/2025     Infection     Radiology(recent) CT Cervical Spine Without Contrast  Result Date: 4/12/2025  No acute fracture or subluxation identified.  Radiation dose reduction techniques were utilized, including automated exposure control and exposure modulation based on body size.   This report was finalized on 4/12/2025 1:57 AM by Dr. Alice Allen M.D on Workstation: [id number]      CT Head Without Contrast  Result Date: 4/12/2025  No acute intracranial findings.  Radiation dose reduction techniques were utilized, including automated exposure control and exposure modulation based on body size.   This report was finalized on 4/12/2025 1:55 AM by Dr. Alice Allen M.D on Workstation: [id number]      No results found for: \"TROPONINT\", \"TROPONINI\", \"BNP\"  No components found for: \"TSH;2\"    amiodarone, 200 mg, Oral, Q24H  apixaban, 2.5 mg, Oral, BID  aspirin, 81 mg, Oral, Daily  atorvastatin, 80 mg, Oral, Nightly  cetirizine, 10 mg, Oral, Daily  cholecalciferol, 5,000 Units, Oral, Daily  fluticasone, 2 spray, Each Nare, Daily  insulin lispro, 2-9 Units, Subcutaneous, 4x Daily AC & at Bedtime  levothyroxine, 75 mcg, Oral, Q AM  midodrine, 5 mg, Oral, TID AC  pantoprazole, 40 mg, Oral, BID AC  sodium chloride, 10 mL, Intravenous, Q12H  [Held by provider] tamsulosin, 0.4 mg, Oral, Daily       Diet: Cardiac, Diabetic; Healthy Heart (2-3 Na+); Consistent Carbohydrate; Fluid " Consistency: Thin (IDDSI 0)      Assessment & Plan      Active Hospital Problems    Diagnosis  POA    **Frequent falls [R29.6]  Not Applicable    Chronic HFrEF (heart failure with reduced ejection fraction) [I50.22]  Yes    ESRD (end stage renal disease) [N18.6]  Yes    History of stroke [Z86.73]  Not Applicable    Paroxysmal atrial fibrillation [I48.0]  Yes    Coronary artery disease involving native coronary artery of native heart without angina pectoris [I25.10]  Yes    Benign prostatic hyperplasia with nocturia [N40.1, R35.1]  Yes    Type 2 diabetes mellitus, with long-term current use of insulin [E11.9, Z79.4]  Not Applicable      Resolved Hospital Problems   No resolved problems to display.       Patient is a 69 y.o. male ESRD on dialysis, CAD, Afib and h/o stroke on eliquis. He was seen last month here for aflutter, hypotension on midodrine, PNA and was discharged from here to rehab.     Discharged from rehab, had a fall at home. Imaging negative in the ER.     PT/OT/CCP consult  Sliding scale insulin  Resumed home asa and eliquis  Home midodrine  Nephrology following  chronic anemia  Add agents for sinus congestion  I discussed the patients findings and my recommendations with patient.     VTE Prophylaxis - eliquis    Some discussion with case management and spouse talked about potentially stopping dialysis and doing more palliative care. I talked to the patient about this and he is unsure of his plan. Will consult palliative care for further discussion with patient and family regarding his plan going forward.     Asim Hogue MD  [facility]  04/13/25  11:53 EDT

## 2025-04-14 LAB
ALBUMIN SERPL-MCNC: 3.1 G/DL (ref 3.5–5.2)
ANION GAP SERPL CALCULATED.3IONS-SCNC: 8.2 MMOL/L (ref 5–15)
BASOPHILS # BLD AUTO: 0.01 10*3/MM3 (ref 0–0.2)
BASOPHILS NFR BLD AUTO: 0.2 % (ref 0–1.5)
BUN SERPL-MCNC: 63 MG/DL (ref 8–23)
BUN/CREAT SERPL: 11.9 (ref 7–25)
CALCIUM SPEC-SCNC: 8.6 MG/DL (ref 8.6–10.5)
CHLORIDE SERPL-SCNC: 100 MMOL/L (ref 98–107)
CO2 SERPL-SCNC: 21.8 MMOL/L (ref 22–29)
CREAT SERPL-MCNC: 5.31 MG/DL (ref 0.76–1.27)
DEPRECATED RDW RBC AUTO: 44.4 FL (ref 37–54)
EGFRCR SERPLBLD CKD-EPI 2021: 11 ML/MIN/1.73
EOSINOPHIL # BLD AUTO: 0.41 10*3/MM3 (ref 0–0.4)
EOSINOPHIL NFR BLD AUTO: 6.3 % (ref 0.3–6.2)
ERYTHROCYTE [DISTWIDTH] IN BLOOD BY AUTOMATED COUNT: 14.5 % (ref 12.3–15.4)
GLUCOSE BLDC GLUCOMTR-MCNC: 102 MG/DL (ref 70–130)
GLUCOSE BLDC GLUCOMTR-MCNC: 133 MG/DL (ref 70–130)
GLUCOSE BLDC GLUCOMTR-MCNC: 210 MG/DL (ref 70–130)
GLUCOSE BLDC GLUCOMTR-MCNC: 225 MG/DL (ref 70–130)
GLUCOSE BLDC GLUCOMTR-MCNC: 235 MG/DL (ref 70–130)
GLUCOSE SERPL-MCNC: 97 MG/DL (ref 65–99)
HCT VFR BLD AUTO: 24.2 % (ref 37.5–51)
HGB BLD-MCNC: 7.7 G/DL (ref 13–17.7)
IMM GRANULOCYTES # BLD AUTO: 0.02 10*3/MM3 (ref 0–0.05)
IMM GRANULOCYTES NFR BLD AUTO: 0.3 % (ref 0–0.5)
LYMPHOCYTES # BLD AUTO: 0.85 10*3/MM3 (ref 0.7–3.1)
LYMPHOCYTES NFR BLD AUTO: 13.1 % (ref 19.6–45.3)
MCH RBC QN AUTO: 27.2 PG (ref 26.6–33)
MCHC RBC AUTO-ENTMCNC: 31.8 G/DL (ref 31.5–35.7)
MCV RBC AUTO: 85.5 FL (ref 79–97)
MONOCYTES # BLD AUTO: 0.89 10*3/MM3 (ref 0.1–0.9)
MONOCYTES NFR BLD AUTO: 13.8 % (ref 5–12)
NEUTROPHILS NFR BLD AUTO: 4.29 10*3/MM3 (ref 1.7–7)
NEUTROPHILS NFR BLD AUTO: 66.3 % (ref 42.7–76)
NRBC BLD AUTO-RTO: 0 /100 WBC (ref 0–0.2)
PHOSPHATE SERPL-MCNC: 3.5 MG/DL (ref 2.5–4.5)
PLATELET # BLD AUTO: 244 10*3/MM3 (ref 140–450)
PMV BLD AUTO: 9.5 FL (ref 6–12)
POTASSIUM SERPL-SCNC: 3.9 MMOL/L (ref 3.5–5.2)
RBC # BLD AUTO: 2.83 10*6/MM3 (ref 4.14–5.8)
SODIUM SERPL-SCNC: 130 MMOL/L (ref 136–145)
WBC NRBC COR # BLD AUTO: 6.47 10*3/MM3 (ref 3.4–10.8)

## 2025-04-14 PROCEDURE — 82948 REAGENT STRIP/BLOOD GLUCOSE: CPT

## 2025-04-14 PROCEDURE — G0378 HOSPITAL OBSERVATION PER HR: HCPCS

## 2025-04-14 PROCEDURE — 80069 RENAL FUNCTION PANEL: CPT | Performed by: INTERNAL MEDICINE

## 2025-04-14 PROCEDURE — 85025 COMPLETE CBC W/AUTO DIFF WBC: CPT | Performed by: INTERNAL MEDICINE

## 2025-04-14 PROCEDURE — 63710000001 INSULIN LISPRO (HUMAN) PER 5 UNITS: Performed by: NURSE PRACTITIONER

## 2025-04-14 PROCEDURE — 25010000002 NA FERRIC GLUC CPLX PER 12.5 MG: Performed by: INTERNAL MEDICINE

## 2025-04-14 PROCEDURE — G0257 UNSCHED DIALYSIS ESRD PT HOS: HCPCS

## 2025-04-14 RX ORDER — ALBUMIN (HUMAN) 12.5 G/50ML
12.5 SOLUTION INTRAVENOUS AS NEEDED
Status: ACTIVE | OUTPATIENT
Start: 2025-04-14 | End: 2025-04-14

## 2025-04-14 RX ADMIN — MIDODRINE HYDROCHLORIDE 5 MG: 5 TABLET ORAL at 11:35

## 2025-04-14 RX ADMIN — INSULIN LISPRO 4 UNITS: 100 INJECTION, SOLUTION INTRAVENOUS; SUBCUTANEOUS at 11:35

## 2025-04-14 RX ADMIN — AMIODARONE HYDROCHLORIDE 200 MG: 200 TABLET ORAL at 09:18

## 2025-04-14 RX ADMIN — PANTOPRAZOLE SODIUM 40 MG: 40 TABLET, DELAYED RELEASE ORAL at 06:41

## 2025-04-14 RX ADMIN — PANTOPRAZOLE SODIUM 40 MG: 40 TABLET, DELAYED RELEASE ORAL at 18:01

## 2025-04-14 RX ADMIN — INSULIN LISPRO 4 UNITS: 100 INJECTION, SOLUTION INTRAVENOUS; SUBCUTANEOUS at 22:26

## 2025-04-14 RX ADMIN — Medication 10 ML: at 22:27

## 2025-04-14 RX ADMIN — Medication 5000 UNITS: at 09:18

## 2025-04-14 RX ADMIN — APIXABAN 2.5 MG: 2.5 TABLET, FILM COATED ORAL at 22:27

## 2025-04-14 RX ADMIN — FLUTICASONE PROPIONATE 2 SPRAY: 50 SPRAY, METERED NASAL at 09:21

## 2025-04-14 RX ADMIN — APIXABAN 2.5 MG: 2.5 TABLET, FILM COATED ORAL at 09:18

## 2025-04-14 RX ADMIN — Medication 10 ML: at 09:21

## 2025-04-14 RX ADMIN — MIDODRINE HYDROCHLORIDE 5 MG: 5 TABLET ORAL at 06:41

## 2025-04-14 RX ADMIN — MIDODRINE HYDROCHLORIDE 5 MG: 5 TABLET ORAL at 18:01

## 2025-04-14 RX ADMIN — ATORVASTATIN CALCIUM 80 MG: 80 TABLET, FILM COATED ORAL at 22:27

## 2025-04-14 RX ADMIN — LEVOTHYROXINE SODIUM 75 MCG: 0.07 TABLET ORAL at 06:40

## 2025-04-14 RX ADMIN — CETIRIZINE HYDROCHLORIDE 10 MG: 10 TABLET, FILM COATED ORAL at 09:18

## 2025-04-14 RX ADMIN — ASPIRIN 81 MG: 81 TABLET, COATED ORAL at 09:18

## 2025-04-14 NOTE — NURSING NOTE
Dialysis complete, removed 1 liter with this treatment and no complications noted.  Pre and post vitals are in epic.  Pt received ferlecit 125 mg with this treatment.  Pr and post vitals are in epic. Report given to Efren Claudio rn

## 2025-04-14 NOTE — PROGRESS NOTES
[facility] Progress Note      Patient Name: Carlito Mo  : 1955  MRN: [id number]  Primary Care Physician:  Belle Simons APRN  Date of admission: 2025    Subjective     Interval History:   F/u ESRD     Review of Systems:   On dialysis  Feels fatigued in general  No dyspnea    Objective     Vitals:   Temp:  [97.3 °F (36.3 °C)-97.9 °F (36.6 °C)] 97.3 °F (36.3 °C)  Heart Rate:  [59-66] 59  Resp:  [16-20] 16  BP: (121-133)/(59-84) 126/64    Intake/Output Summary (Last 24 hours) at 2025 1710  Last data filed at 2025 0500  Gross per 24 hour   Intake --   Output 350 ml   Net -350 ml       Physical Exam:    General Appearance: frail cachectic WM on dialysis no distress alert  Neck: supple, no JVD  Lungs: CTA bilat no rales  Heart: RRR, normal S1 and S2  Abdomen: soft, nontender, nondistended  Extremities: no edema, RUE AVF cannulated     Scheduled Meds:     amiodarone, 200 mg, Oral, Q24H  apixaban, 2.5 mg, Oral, BID  aspirin, 81 mg, Oral, Daily  atorvastatin, 80 mg, Oral, Nightly  cetirizine, 10 mg, Oral, Daily  cholecalciferol, 5,000 Units, Oral, Daily  fluticasone, 2 spray, Each Nare, Daily  insulin lispro, 2-9 Units, Subcutaneous, 4x Daily AC & at Bedtime  levothyroxine, 75 mcg, Oral, Q AM  midodrine, 5 mg, Oral, TID AC  pantoprazole, 40 mg, Oral, BID AC  sodium chloride, 10 mL, Intravenous, Q12H  [Held by provider] tamsulosin, 0.4 mg, Oral, Daily      IV Meds:        Results Reviewed:   I have personally reviewed the results from the time of this admission to 2025 17:10 EDT     Results from last 7 days   Lab Units 25  0356 25  0448 25  0101   SODIUM mmol/L 130* 134* 138   POTASSIUM mmol/L 3.9 3.7 3.4*   CHLORIDE mmol/L 100 100 103   CO2 mmol/L 21.8* 22.8 24.4   BUN mg/dL 63* 46* 24*   CREATININE mg/dL 5.31* 4.27* 2.86*   CALCIUM mg/dL 8.6 8.6 8.7   BILIRUBIN mg/dL  --   --  0.3   ALK PHOS U/L  --   --  143*   ALT (SGPT) U/L  --   --  22    AST (SGOT) U/L  --   --  21   GLUCOSE mg/dL 97 108* 227*     Estimated Creatinine Clearance: 11.8 mL/min (A) (by C-G formula based on SCr of 5.31 mg/dL (H)).  Results from last 7 days   Lab Units 04/14/25  0356 04/13/25  0448 04/12/25  0101   MAGNESIUM mg/dL  --   --  1.7   PHOSPHORUS mg/dL 3.5 3.2 1.7*     Results from last 7 days   Lab Units 04/12/25  1226   URIC ACID mg/dL 2.8*     Results from last 7 days   Lab Units 04/14/25  0356 04/13/25  0448 04/12/25  0101   WBC 10*3/mm3 6.47 9.05 4.64   HEMOGLOBIN g/dL 7.7* 8.1* 8.4*   PLATELETS 10*3/mm3 244 255 258           Assessment / Plan     ASSESSMENT:  ESRD on HD MWF via RUE AVG at [facility].  Volume/lytes stable - low K/Phos repleted over weekend (and removed renal restriction from diet)  Hypotension with ESRD: On midodrine 5 TID, resting BP ok; too frail to even attempt orthostatics   Fall with generalized weakness.  Stopped flomax as it can cause orthostasis.  Diabetes mellitus type 2 with ESRD: A1c 7.1.  Mgmt per primary team  BPH: as above stopped flomax   Anemia of chronic disease: hgb down slightly 7.7.  TSAt low 8% with ferritin 786.  IV iron with dialysis today   Hyperlipidemia: On Lipitor       PLAN:  HD in progress with gentle UF  Note plan for [facility] mtg tomorrow with palliative care.  He's very frail and had multi hospitalizations.  Tells me not leaning one way or other re: cessation of dialysis / hospice      Jose Enrique Montelongo MD  04/14/25  17:10 EDT    [facility]  [phone]

## 2025-04-14 NOTE — PLAN OF CARE
Goal Outcome Evaluation:  Plan of Care Reviewed With: patient        Progress: improving  Outcome Evaluation: VSS.Palliative care nurse saw him today but he want his wife on the conversation too,because he has dialysis today meeting is scheduled for tomorrow.Plan of care on going.

## 2025-04-14 NOTE — PROGRESS NOTES
Name: Carlito Mo ADMIT: 2025   : 1955  PCP: Belle Simons APRN    MRN: [id number] LOS: 0 days   AGE/SEX: 69 y.o. male  ROOM: [facility]   Subjective   Chief Complaint   Patient presents with    Fall     Patient is a 69 y.o. male ESRD on dialysis, CAD, Afib and h/o stroke on eliquis. He was seen last month here for aflutter, hypotension on midodrine, PNA and was discharged from here to rehab.     Discharged from rehab, had a fall at home. Imaging negative in the ER.    Feels ok today  No new complaints. Generalized weakness    ROS  No f/c  No n/v  No cp/palp  No soa/cough    Objective   Vital Signs  Temp:  [97.9 °F (36.6 °C)-98.6 °F (37 °C)] 97.9 °F (36.6 °C)  Heart Rate:  [59-66] 59  Resp:  [18-20] 20  BP: (121-134)/(59-84) 121/59  SpO2:  [97 %-99 %] 99 %  on   ;   Device (Oxygen Therapy): room air  Body mass index is 18.99 kg/m².    Physical Exam  HENT:      Head: Normocephalic and atraumatic.   Eyes:      General: No scleral icterus.  Pulmonary:      Effort: Pulmonary effort is normal. No respiratory distress.   Abdominal:      General: There is no distension.      Palpations: Abdomen is soft.      Tenderness: There is no abdominal tenderness.   Musculoskeletal:      Cervical back: Neck supple.   Neurological:      Mental Status: He is alert.   Psychiatric:         Behavior: Behavior normal.     Chronically ill  Muscle atrophy    Results Review:       I reviewed the patient's new clinical results.  Results from last 7 days   Lab Units 25  0356 25  0448 25  0101   WBC 10*3/mm3 6.47 9.05 4.64   HEMOGLOBIN g/dL 7.7* 8.1* 8.4*   PLATELETS 10*3/mm3 244 255 258     Results from last 7 days   Lab Units 25  0356 25  0448 25  0101   SODIUM mmol/L 130* 134* 138   POTASSIUM mmol/L 3.9 3.7 3.4*   CHLORIDE mmol/L 100 100 103   CO2 mmol/L 21.8* 22.8 24.4   BUN mg/dL 63* 46* 24*   CREATININE mg/dL 5.31* 4.27* 2.86*   GLUCOSE mg/dL 97 108* 227*   Estimated Creatinine  "Clearance: 11.8 mL/min (A) (by C-G formula based on SCr of 5.31 mg/dL (H)).  Results from last 7 days   Lab Units 04/14/25  0356 04/13/25 0448 04/12/25  0101   ALBUMIN g/dL 3.1* 3.2* 3.5   BILIRUBIN mg/dL  --   --  0.3   ALK PHOS U/L  --   --  143*   AST (SGOT) U/L  --   --  21   ALT (SGPT) U/L  --   --  22     Results from last 7 days   Lab Units 04/14/25  0356 04/13/25 0448 04/12/25  0101   CALCIUM mg/dL 8.6 8.6 8.7   ALBUMIN g/dL 3.1* 3.2* 3.5   MAGNESIUM mg/dL  --   --  1.7   PHOSPHORUS mg/dL 3.5 3.2 1.7*         Coag     HbA1C   Lab Results   Component Value Date    HGBA1C 7.10 (H) 03/10/2025    HGBA1C 8 (H) 02/19/2025    HGBA1C 8.2 (H) 01/08/2025     Infection     Radiology(recent) No radiology results for the last day    No results found for: \"TROPONINT\", \"TROPONINI\", \"BNP\"  No components found for: \"TSH;2\"    amiodarone, 200 mg, Oral, Q24H  apixaban, 2.5 mg, Oral, BID  aspirin, 81 mg, Oral, Daily  atorvastatin, 80 mg, Oral, Nightly  cetirizine, 10 mg, Oral, Daily  cholecalciferol, 5,000 Units, Oral, Daily  ferric gluconate, 125 mg, Intravenous, Once in Dialysis  fluticasone, 2 spray, Each Nare, Daily  insulin lispro, 2-9 Units, Subcutaneous, 4x Daily AC & at Bedtime  levothyroxine, 75 mcg, Oral, Q AM  midodrine, 5 mg, Oral, TID AC  pantoprazole, 40 mg, Oral, BID AC  sodium chloride, 10 mL, Intravenous, Q12H  [Held by provider] tamsulosin, 0.4 mg, Oral, Daily       Diet: Cardiac, Diabetic; Healthy Heart (2-3 Na+); Consistent Carbohydrate; Fluid Consistency: Thin (IDDSI 0)      Assessment & Plan      Active Hospital Problems    Diagnosis  POA    **Frequent falls [R29.6]  Not Applicable    Chronic HFrEF (heart failure with reduced ejection fraction) [I50.22]  Yes    ESRD (end stage renal disease) [N18.6]  Yes    History of stroke [Z86.73]  Not Applicable    Paroxysmal atrial fibrillation [I48.0]  Yes    Coronary artery disease involving native coronary artery of native heart without angina pectoris [I25.10]  " Yes    Benign prostatic hyperplasia with nocturia [N40.1, R35.1]  Yes    Type 2 diabetes mellitus, with long-term current use of insulin [E11.9, Z79.4]  Not Applicable      Resolved Hospital Problems   No resolved problems to display.       Patient is a 69 y.o. male ESRD on dialysis, CAD, Afib and h/o stroke on eliquis. He was seen last month here for aflutter, hypotension on midodrine, PNA and was discharged from here to rehab.     Discharged from rehab, had a fall at home. Imaging negative in the ER.     PT/OT/CCP consult  Sliding scale insulin  Resumed home asa and eliquis  Home midodrine  Nephrology following for HD  chronic anemia  agents for sinus congestion  I discussed the patients findings and my recommendations with patient.     VTE Prophylaxis - eliquis    Some discussion with case management and spouse talked about potentially stopping dialysis and doing more palliative care. I talked to the patient about this and he is unsure of his plan. Have consulted palliative care for further discussion with patient and family regarding his plan going forward if consideration of stopping HD vs needing rehab and or long term care.     Asim Hogue MD  [facility]  04/14/25  11:53 EDT

## 2025-04-14 NOTE — CONSULTS
Purpose of the visit was to evaluate for: goals of care/advanced care planning and support for patient/family. Spoke with RN as well as patient and family and discussed palliative care, goals of care, and care options.      Assessment:  Patient is 69 year old male with ESRD on HD, history of CAD, CVA, afib, COPD. Recently discharged from rehab and fell at home. When I saw him today he was awake and alert, no acute complaints, eating lunch. Mobility significantly limited by chronic illness. PPS 40%    Cultural and spiritual needs have been assessed.    Recommendations/Plan: Goals of care meeting scheduled for 4/15 @ 1pm.    Other Comments: Spoke with patient at bedside, he asked that I call his wife to be present for conversation. I spoke with wife Lissette by phone this afternoon and she stated since patient has dialysis today meeting tomorrow would be better. I will follow up at set time.

## 2025-04-14 NOTE — PROGRESS NOTES
Nutrition Services    Patient Name: Carlito Mo  YOB: 1955  MRN: [id number]  Admission date: 4/12/2025    Assessment Date:  04/14/25    NUTRITION EVALUATION      Reason for Encounter BMI   Diagnosis/Problem Admission Diagnosis:  Hypokalemia [E87.6]  Hypophosphatemia [E83.39]  Chronic anticoagulation [Z79.01]  Multifactorial functional impairment [R53.81]  Frequent falls [R29.6]  Closed head injury, initial encounter [S09.90XA]    Problem List:    Frequent falls    Type 2 diabetes mellitus, with long-term current use of insulin    Benign prostatic hyperplasia with nocturia    Coronary artery disease involving native coronary artery of native heart without angina pectoris    Paroxysmal atrial fibrillation    History of stroke    ESRD (end stage renal disease)    Chronic HFrEF (heart failure with reduced ejection fraction)     Narrative 4/14: Pt admitted to [facility] s/p fall at home after stay at rehab. Palliative care consulted as pt with recent frequent hospitalizations. RD visited pt/pt's wife at bedside. Pt likes chocolate or strawberry ONS. Asked pt if he wanted to try mighty shakes - strawberry, pt agreeable but wife wondering if these are appropriate with ESRD on HD. Pt's K+ and phos low-WNL this admission. Mighty shakes contain 90 mg K+ and 190 mg phosphorus. Will add one mighty shake daily and monitor labs. Pt apparently eats well if he likes what is served. Noted palliative meeting 4/15 re: possible discontinuation of dialysis. NFPE completed, consistent with nutrition diagnosis of malnutrition using AND/ASPEN criteria. See MSA below.        PO Diet Diet: Cardiac, Diabetic; Healthy Heart (2-3 Na+); Consistent Carbohydrate; Fluid Consistency: Thin (IDDSI 0)   Allergies NKFA   Supplements n/a   PO Intake % 75%       Chewing/Swallowing Difficulty no issues identified at this time       Medications reviewed   Labs  reviewed       Physical Findings room air     Edema no edema    GI Function last bowel  "movement: 4/13   Skin Status skin intact    Lines/Drains dialysis catheter   I/O reviewed        Height  Weight  BMI  Weight Trend     Height: 182.9 cm (72\")  Weight: 63.5 kg (140 lb) (04/12/25 0047)  Body mass index is 18.99 kg/m².  Stable         NFPE See Malnutrition Severity Assessment, Date Completed: 4/15       Nutrition Problem (PES) Moderate chronic disease related malnutrition related to multiple chronic diseases (HFrEF, ESRD on HD) as evidenced by overall severe muscle and fat wasting per NFPE.        Intervention/Plan Continue current diet and ONS.  Addition of Mighty Shake q daily (Provides 220 kcals, 6 g protein if consumed) - strawberry.     RD to follow up per protocol.     Results from last 7 days   Lab Units 04/14/25 0356 04/13/25 0448 04/12/25  0101   SODIUM mmol/L 130* 134* 138   POTASSIUM mmol/L 3.9 3.7 3.4*   CHLORIDE mmol/L 100 100 103   CO2 mmol/L 21.8* 22.8 24.4   BUN mg/dL 63* 46* 24*   CREATININE mg/dL 5.31* 4.27* 2.86*   CALCIUM mg/dL 8.6 8.6 8.7   BILIRUBIN mg/dL  --   --  0.3   ALK PHOS U/L  --   --  143*   ALT (SGPT) U/L  --   --  22   AST (SGOT) U/L  --   --  21   GLUCOSE mg/dL 97 108* 227*     Results from last 7 days   Lab Units 04/14/25 0356 04/13/25 0448 04/12/25  0101   MAGNESIUM mg/dL  --   --  1.7   PHOSPHORUS mg/dL 3.5   < > 1.7*   HEMOGLOBIN g/dL 7.7*   < > 8.4*   HEMATOCRIT % 24.2*   < > 27.5*    < > = values in this interval not displayed.     Lab Results   Component Value Date    HGBA1C 7.10 (H) 03/10/2025     Wt Readings from Last 10 Encounters:   04/12/25 63.5 kg (140 lb)   03/20/25 69.9 kg (154 lb 1.6 oz)   10/25/24 66.5 kg (146 lb 9.6 oz)   10/02/24 63.5 kg (140 lb)   09/08/24 60.7 kg (133 lb 12.8 oz)   05/29/24 62.8 kg (138 lb 7.2 oz)   04/16/24 64.4 kg (142 lb)   01/11/24 64.7 kg (142 lb 11.2 oz)   12/10/23 65.1 kg (143 lb 8.3 oz)   11/23/23 63.1 kg (139 lb 1.6 oz)     Malnutrition Severity Assessment      Patient meets criteria for : Moderate (non-severe) " Malnutrition  Malnutrition Type (Last 8 Hours)       Malnutrition Severity Assessment       Row Name 04/15/25 1528       Malnutrition Severity Assessment    Malnutrition Type Chronic Disease - Related Malnutrition      Row Name 04/15/25 1528       Muscle Loss    Loss of Muscle Mass Findings Moderate    Clavicle Bone Region Moderate - some protrusion in females, visible in males    Acromion Bone Region Moderate - acromion may slightly protrude    Patellar Region Moderate - patella more prominent, less muscle definition around patella    Anterior Thigh Region Moderate - mild depression on inner thigh    Posterior Calf Region Moderate - some roundness, slight firmness      Row Name 04/15/25 1528       Fat Loss    Subcutaneous Fat Loss Findings Moderate    Orbital Region  Moderate -  somewhat hollowness, slightly dark circles    Upper Arm Region Moderate - some fat tissue, not ample      Row Name 04/15/25 1528       Criteria Met (Must meet criteria for severity in at least 2 of these categories: M Wasting, Fat Loss, Fluid, Secondary Signs, Wt. Status, Intake)    Patient meets criteria for  Moderate (non-severe) Malnutrition                         Electronically signed by:  Kristin Galicia RD  04/14/25 07:27 EDT

## 2025-04-14 NOTE — PLAN OF CARE
Goal Outcome Evaluation:  Plan of Care Reviewed With: patient        Progress: improving  Outcome Evaluation: No acute events overnight.

## 2025-04-14 NOTE — CASE MANAGEMENT/SOCIAL WORK
Continued Stay Note  [facility]     Patient Name: Carlito Mo  MRN: [id number]  Today's Date: 4/14/2025    Admit Date: 4/12/2025    Plan: Palliative pending.   Discharge Plan       Row Name 04/14/25 1534       Plan    Plan Palliative pending.    Plan Comments Chart reviewed. Patient in HD today. Patient and family to meet with palliative tomorrow 4/15 @1300.                   Discharge Codes    No documentation.                 Expected Discharge Date and Time       Expected Discharge Date Expected Discharge Time    Apr 16, 2025               Marely Castro RN

## 2025-04-14 NOTE — SIGNIFICANT NOTE
04/14/25 1337   OTHER   Discipline physical therapist   Rehab Time/Intention   Session Not Performed patient unavailable for treatment  (dialysis. Will follow up tomorrow)   Recommendation   PT - Next Appointment 04/15/25

## 2025-04-15 LAB
ALBUMIN SERPL-MCNC: 3 G/DL (ref 3.5–5.2)
ANION GAP SERPL CALCULATED.3IONS-SCNC: 10.5 MMOL/L (ref 5–15)
BASOPHILS # BLD AUTO: 0.01 10*3/MM3 (ref 0–0.2)
BASOPHILS NFR BLD AUTO: 0.2 % (ref 0–1.5)
BUN SERPL-MCNC: 32 MG/DL (ref 8–23)
BUN/CREAT SERPL: 9.6 (ref 7–25)
CALCIUM SPEC-SCNC: 8.6 MG/DL (ref 8.6–10.5)
CHLORIDE SERPL-SCNC: 103 MMOL/L (ref 98–107)
CO2 SERPL-SCNC: 22.5 MMOL/L (ref 22–29)
CREAT SERPL-MCNC: 3.35 MG/DL (ref 0.76–1.27)
DEPRECATED RDW RBC AUTO: 45.2 FL (ref 37–54)
EGFRCR SERPLBLD CKD-EPI 2021: 19.1 ML/MIN/1.73
EOSINOPHIL # BLD AUTO: 0.45 10*3/MM3 (ref 0–0.4)
EOSINOPHIL NFR BLD AUTO: 7.6 % (ref 0.3–6.2)
ERYTHROCYTE [DISTWIDTH] IN BLOOD BY AUTOMATED COUNT: 14.4 % (ref 12.3–15.4)
GLUCOSE BLDC GLUCOMTR-MCNC: 137 MG/DL (ref 70–130)
GLUCOSE BLDC GLUCOMTR-MCNC: 152 MG/DL (ref 70–130)
GLUCOSE BLDC GLUCOMTR-MCNC: 200 MG/DL (ref 70–130)
GLUCOSE BLDC GLUCOMTR-MCNC: 94 MG/DL (ref 70–130)
GLUCOSE SERPL-MCNC: 74 MG/DL (ref 65–99)
HCT VFR BLD AUTO: 24.8 % (ref 37.5–51)
HGB BLD-MCNC: 7.9 G/DL (ref 13–17.7)
IMM GRANULOCYTES # BLD AUTO: 0.03 10*3/MM3 (ref 0–0.05)
IMM GRANULOCYTES NFR BLD AUTO: 0.5 % (ref 0–0.5)
LYMPHOCYTES # BLD AUTO: 0.74 10*3/MM3 (ref 0.7–3.1)
LYMPHOCYTES NFR BLD AUTO: 12.4 % (ref 19.6–45.3)
MCH RBC QN AUTO: 27.3 PG (ref 26.6–33)
MCHC RBC AUTO-ENTMCNC: 31.9 G/DL (ref 31.5–35.7)
MCV RBC AUTO: 85.8 FL (ref 79–97)
MONOCYTES # BLD AUTO: 0.9 10*3/MM3 (ref 0.1–0.9)
MONOCYTES NFR BLD AUTO: 15.1 % (ref 5–12)
NEUTROPHILS NFR BLD AUTO: 3.82 10*3/MM3 (ref 1.7–7)
NEUTROPHILS NFR BLD AUTO: 64.2 % (ref 42.7–76)
NRBC BLD AUTO-RTO: 0 /100 WBC (ref 0–0.2)
PHOSPHATE SERPL-MCNC: 2.7 MG/DL (ref 2.5–4.5)
PLATELET # BLD AUTO: 259 10*3/MM3 (ref 140–450)
PMV BLD AUTO: 9.6 FL (ref 6–12)
POTASSIUM SERPL-SCNC: 3.9 MMOL/L (ref 3.5–5.2)
QT INTERVAL: 501 MS
QTC INTERVAL: 498 MS
RBC # BLD AUTO: 2.89 10*6/MM3 (ref 4.14–5.8)
SODIUM SERPL-SCNC: 136 MMOL/L (ref 136–145)
WBC NRBC COR # BLD AUTO: 5.95 10*3/MM3 (ref 3.4–10.8)

## 2025-04-15 PROCEDURE — 80069 RENAL FUNCTION PANEL: CPT | Performed by: INTERNAL MEDICINE

## 2025-04-15 PROCEDURE — G0378 HOSPITAL OBSERVATION PER HR: HCPCS

## 2025-04-15 PROCEDURE — 82948 REAGENT STRIP/BLOOD GLUCOSE: CPT

## 2025-04-15 PROCEDURE — 63710000001 INSULIN LISPRO (HUMAN) PER 5 UNITS: Performed by: NURSE PRACTITIONER

## 2025-04-15 PROCEDURE — 85025 COMPLETE CBC W/AUTO DIFF WBC: CPT | Performed by: INTERNAL MEDICINE

## 2025-04-15 RX ORDER — AMOXICILLIN 250 MG
2 CAPSULE ORAL 2 TIMES DAILY
Status: DISCONTINUED | OUTPATIENT
Start: 2025-04-15 | End: 2025-04-19 | Stop reason: HOSPADM

## 2025-04-15 RX ADMIN — LEVOTHYROXINE SODIUM 75 MCG: 0.07 TABLET ORAL at 06:56

## 2025-04-15 RX ADMIN — SENNOSIDES AND DOCUSATE SODIUM 2 TABLET: 50; 8.6 TABLET ORAL at 11:51

## 2025-04-15 RX ADMIN — CETIRIZINE HYDROCHLORIDE 10 MG: 10 TABLET, FILM COATED ORAL at 09:10

## 2025-04-15 RX ADMIN — APIXABAN 2.5 MG: 2.5 TABLET, FILM COATED ORAL at 20:50

## 2025-04-15 RX ADMIN — MIDODRINE HYDROCHLORIDE 5 MG: 5 TABLET ORAL at 06:56

## 2025-04-15 RX ADMIN — ASPIRIN 81 MG: 81 TABLET, COATED ORAL at 09:10

## 2025-04-15 RX ADMIN — MIDODRINE HYDROCHLORIDE 5 MG: 5 TABLET ORAL at 11:51

## 2025-04-15 RX ADMIN — APIXABAN 2.5 MG: 2.5 TABLET, FILM COATED ORAL at 09:10

## 2025-04-15 RX ADMIN — FLUTICASONE PROPIONATE 2 SPRAY: 50 SPRAY, METERED NASAL at 09:10

## 2025-04-15 RX ADMIN — MIDODRINE HYDROCHLORIDE 5 MG: 5 TABLET ORAL at 16:40

## 2025-04-15 RX ADMIN — Medication 10 ML: at 20:51

## 2025-04-15 RX ADMIN — AMIODARONE HYDROCHLORIDE 200 MG: 200 TABLET ORAL at 09:10

## 2025-04-15 RX ADMIN — ATORVASTATIN CALCIUM 80 MG: 80 TABLET, FILM COATED ORAL at 20:50

## 2025-04-15 RX ADMIN — Medication 10 ML: at 09:10

## 2025-04-15 RX ADMIN — Medication 5000 UNITS: at 09:10

## 2025-04-15 RX ADMIN — PANTOPRAZOLE SODIUM 40 MG: 40 TABLET, DELAYED RELEASE ORAL at 16:40

## 2025-04-15 RX ADMIN — PANTOPRAZOLE SODIUM 40 MG: 40 TABLET, DELAYED RELEASE ORAL at 06:56

## 2025-04-15 RX ADMIN — INSULIN LISPRO 4 UNITS: 100 INJECTION, SOLUTION INTRAVENOUS; SUBCUTANEOUS at 16:40

## 2025-04-15 RX ADMIN — INSULIN LISPRO 2 UNITS: 100 INJECTION, SOLUTION INTRAVENOUS; SUBCUTANEOUS at 11:51

## 2025-04-15 NOTE — PLAN OF CARE
Goal Outcome Evaluation:              Outcome Evaluation: pt is A&Ox4, up with assist x2, Dialysis scheduled for tomorrow, Will continue plan of care.

## 2025-04-15 NOTE — TREATMENT PLAN
"Spoke at length with patient and his wife Lissette at beside. Also had a daughter on speaker phone for part of the conversation. We discussed goals of care, treatment options and code status in depth. Patient is considering stopping dialysis but is not ready to do so at this time. Would like to try and get stronger at rehab. They do not want to go back to [facility] and would like to know what about other options for rehab.     Patient apparently does have a living will but it is not available for review and wife status it is \"pretty vague\". We discussed burdens and benefits of life-prolonging measures like CPR and intubation. Patient states he thinks he wants to remain FULL CODE. Encouraged him to reflect further regarding his wishes and to discuss with his family as well.    Patient already enrolled in [facility]. No further needs from inpatient palliative care team at this time, although I left my contact information and encouraged them to reach out in the future for any needs or questions. Discussed with RN.  "

## 2025-04-15 NOTE — PROGRESS NOTES
[facility] Progress Note      Patient Name: Carlito Mo  : 1955  MRN: [id number]  Primary Care Physician:  Belle Simons APRN  Date of admission: 2025    Subjective     Interval History:   Follow-up ESRD.  Does not feel well.  Eating.  Dialyzed yesterday.  1 L removed.  He asks me what would happen if he stopped dialysis.  See discussion below.  Tells me his quality of life is very poor.  Review of Systems:   Eating.  Bowels moving.  No pain at the moment.  Weak.    Objective     Vitals:   Temp:  [97.3 °F (36.3 °C)-98.2 °F (36.8 °C)] 98.2 °F (36.8 °C)  Heart Rate:  [53-63] 63  Resp:  [14-20] 20  BP: (110-142)/(51-77) 110/52    Intake/Output Summary (Last 24 hours) at 4/15/2025 0832  Last data filed at 2025 1713  Gross per 24 hour   Intake --   Output 29697 ml   Net -77292 ml       Physical Exam:    General Appearance: Awake.  Conversant.  Sitting up in bed eating.  Very frail and chronically ill.  Skin: warm and dry  HEENT: oral mucosa normal, nonicteric sclera  Neck: supple, no JVD  Lungs: Clear to auscultation bilaterally.  Breathing comfortably on room air  Heart: RRR, normal S1 and S2  Abdomen: soft, nontender, nondistended. +bs  : no palpable bladder  Extremities: no edema.  Right upper extremity AV fistula patent.  Neuro: normal speech and mental status     Scheduled Meds:     amiodarone, 200 mg, Oral, Q24H  apixaban, 2.5 mg, Oral, BID  aspirin, 81 mg, Oral, Daily  atorvastatin, 80 mg, Oral, Nightly  cetirizine, 10 mg, Oral, Daily  cholecalciferol, 5,000 Units, Oral, Daily  fluticasone, 2 spray, Each Nare, Daily  insulin lispro, 2-9 Units, Subcutaneous, 4x Daily AC & at Bedtime  levothyroxine, 75 mcg, Oral, Q AM  midodrine, 5 mg, Oral, TID AC  pantoprazole, 40 mg, Oral, BID AC  sodium chloride, 10 mL, Intravenous, Q12H  [Held by provider] tamsulosin, 0.4 mg, Oral, Daily      IV Meds:        Results Reviewed:   I have personally reviewed the results from the  "time of this admission to 4/15/2025 08:32 EDT     Results from last 7 days   Lab Units 04/15/25  0356 04/14/25 0356 04/13/25 0448 04/12/25  0101   SODIUM mmol/L 136 130* 134* 138   POTASSIUM mmol/L 3.9 3.9 3.7 3.4*   CHLORIDE mmol/L 103 100 100 103   CO2 mmol/L 22.5 21.8* 22.8 24.4   BUN mg/dL 32* 63* 46* 24*   CREATININE mg/dL 3.35* 5.31* 4.27* 2.86*   CALCIUM mg/dL 8.6 8.6 8.6 8.7   BILIRUBIN mg/dL  --   --   --  0.3   ALK PHOS U/L  --   --   --  143*   ALT (SGPT) U/L  --   --   --  22   AST (SGOT) U/L  --   --   --  21   GLUCOSE mg/dL 74 97 108* 227*       Estimated Creatinine Clearance: 18.7 mL/min (A) (by C-G formula based on SCr of 3.35 mg/dL (H)).    Results from last 7 days   Lab Units 04/15/25  0356 04/14/25  0356 04/13/25  0448 04/12/25  0101   MAGNESIUM mg/dL  --   --   --  1.7   PHOSPHORUS mg/dL 2.7 3.5 3.2 1.7*       Results from last 7 days   Lab Units 04/12/25  1226   URIC ACID mg/dL 2.8*       Results from last 7 days   Lab Units 04/15/25  0356 04/14/25  0356 04/13/25  0448 04/12/25  0101   WBC 10*3/mm3 5.95 6.47 9.05 4.64   HEMOGLOBIN g/dL 7.9* 7.7* 8.1* 8.4*   PLATELETS 10*3/mm3 259 244 255 258             Assessment / Plan     ASSESSMENT:  ESRD.  Patient considering stopping dialysis.  Asks me what would happen if he should stop dialysis.  I explained that his potassium could get high and stop his heart.  His waste product level would rise to the point of causing sedation.  I also explained that we could keep him very comfortable through palliative care with medicines for symptoms such as anxiety, pain, shortness of air.  He says he is about ready to \"give it up\".  Hypotension on midodrine.  Falls, generalized weakness.  Diabetes mellitus type 2.  Nephropathy neuropathy.  Anemia of CKD.  Hemoglobin 7.9.  Received IV iron with dialysis yesterday.    PLAN:  We discussed palliative care.  He is to have a meeting with his wife and the palliative care team today.   Will decide on dialysis tomorrow " based on his discussion regarding goals of care today.    Thank you for involving us in the care of Carlito Mo.  Please feel free to call with any questions.    Zahida Hsu MD  04/15/25  08:32 EDT    [facility]  [phone]    Please note that portions of this note were completed with a voice recognition program.

## 2025-04-15 NOTE — PROGRESS NOTES
Name: Carlito Mo ADMIT: 2025   : 1955  PCP: Belle Simons APRN    MRN: [id number] LOS: 0 days   AGE/SEX: 69 y.o. male  ROOM: [facility]   Subjective   Chief Complaint   Patient presents with    Fall     Patient is a 69 y.o. male ESRD on dialysis, CAD, Afib and h/o stroke on eliquis. He was seen last month here for aflutter, hypotension on midodrine, PNA and was discharged from here to rehab.     Discharged from rehab, had a fall at home. Imaging negative in the ER.    Feels ok today  Generalized weakness  Feels he needs to have a BM    ROS  No f/c  No n/v  No cp/palp  No soa/cough    Objective   Vital Signs  Temp:  [97.3 °F (36.3 °C)-98.2 °F (36.8 °C)] 98.2 °F (36.8 °C)  Heart Rate:  [53-63] 63  Resp:  [14-20] 20  BP: (110-142)/(51-64) 110/52  SpO2:  [98 %-99 %] 99 %  on   ;   Device (Oxygen Therapy): room air  Body mass index is 18.99 kg/m².    Physical Exam  HENT:      Head: Normocephalic and atraumatic.   Eyes:      General: No scleral icterus.  Pulmonary:      Effort: Pulmonary effort is normal. No respiratory distress.   Abdominal:      General: There is no distension.      Palpations: Abdomen is soft.      Tenderness: There is no abdominal tenderness.   Musculoskeletal:      Cervical back: Neck supple.   Neurological:      Mental Status: He is alert.   Psychiatric:         Behavior: Behavior normal.     Chronically ill  Muscle atrophy    Results Review:       I reviewed the patient's new clinical results.  Results from last 7 days   Lab Units 04/15/25  0356 25  0356 258 25  0101   WBC 10*3/mm3 5.95 6.47 9.05 4.64   HEMOGLOBIN g/dL 7.9* 7.7* 8.1* 8.4*   PLATELETS 10*3/mm3 259 244 255 258     Results from last 7 days   Lab Units 04/15/25  0356 25  0356 25  0448 25  0101   SODIUM mmol/L 136 130* 134* 138   POTASSIUM mmol/L 3.9 3.9 3.7 3.4*   CHLORIDE mmol/L 103 100 100 103   CO2 mmol/L 22.5 21.8* 22.8 24.4   BUN mg/dL 32* 63* 46* 24*   CREATININE mg/dL  "3.35* 5.31* 4.27* 2.86*   GLUCOSE mg/dL 74 97 108* 227*   Estimated Creatinine Clearance: 18.7 mL/min (A) (by C-G formula based on SCr of 3.35 mg/dL (H)).  Results from last 7 days   Lab Units 04/15/25  0356 04/14/25  0356 04/13/25  0448 04/12/25  0101   ALBUMIN g/dL 3.0* 3.1* 3.2* 3.5   BILIRUBIN mg/dL  --   --   --  0.3   ALK PHOS U/L  --   --   --  143*   AST (SGOT) U/L  --   --   --  21   ALT (SGPT) U/L  --   --   --  22     Results from last 7 days   Lab Units 04/15/25  0356 04/14/25  0356 04/13/25  0448 04/12/25  0101   CALCIUM mg/dL 8.6 8.6 8.6 8.7   ALBUMIN g/dL 3.0* 3.1* 3.2* 3.5   MAGNESIUM mg/dL  --   --   --  1.7   PHOSPHORUS mg/dL 2.7 3.5 3.2 1.7*         Coag     HbA1C   Lab Results   Component Value Date    HGBA1C 7.10 (H) 03/10/2025    HGBA1C 8 (H) 02/19/2025    HGBA1C 8.2 (H) 01/08/2025     Infection     Radiology(recent) No radiology results for the last day    No results found for: \"TROPONINT\", \"TROPONINI\", \"BNP\"  No components found for: \"TSH;2\"    amiodarone, 200 mg, Oral, Q24H  apixaban, 2.5 mg, Oral, BID  aspirin, 81 mg, Oral, Daily  atorvastatin, 80 mg, Oral, Nightly  cetirizine, 10 mg, Oral, Daily  cholecalciferol, 5,000 Units, Oral, Daily  fluticasone, 2 spray, Each Nare, Daily  insulin lispro, 2-9 Units, Subcutaneous, 4x Daily AC & at Bedtime  levothyroxine, 75 mcg, Oral, Q AM  midodrine, 5 mg, Oral, TID AC  pantoprazole, 40 mg, Oral, BID AC  senna-docusate sodium, 2 tablet, Oral, BID  sodium chloride, 10 mL, Intravenous, Q12H  [Held by provider] tamsulosin, 0.4 mg, Oral, Daily       Diet: Diabetic; Consistent Carbohydrate; Fluid Consistency: Thin (IDDSI 0)      Assessment & Plan      Active Hospital Problems    Diagnosis  POA    **Frequent falls [R29.6]  Not Applicable    Chronic HFrEF (heart failure with reduced ejection fraction) [I50.22]  Yes    ESRD (end stage renal disease) [N18.6]  Yes    History of stroke [Z86.73]  Not Applicable    Paroxysmal atrial fibrillation [I48.0]  Yes    " Coronary artery disease involving native coronary artery of native heart without angina pectoris [I25.10]  Yes    Benign prostatic hyperplasia with nocturia [N40.1, R35.1]  Yes    Type 2 diabetes mellitus, with long-term current use of insulin [E11.9, Z79.4]  Not Applicable      Resolved Hospital Problems   No resolved problems to display.       Patient is a 69 y.o. male ESRD on dialysis, CAD, Afib and h/o stroke on eliquis. He was seen last month here for aflutter, hypotension on midodrine, PNA and was discharged from here to rehab.     Discharged from rehab, had a fall at home. Imaging negative in the ER.     PT/OT/CCP consult  Sliding scale insulin  Resumed home asa and eliquis  Home midodrine  Nephrology following for HD  chronic anemia  Agents for constipation  agents for sinus congestion  I discussed the patients findings and my recommendations with patient.     VTE Prophylaxis - eliquis    Some discussion with case management and spouse talked about potentially stopping dialysis and doing more palliative care. I talked to the patient about this and he is unsure of his plan. Have consulted palliative care for further discussion with patient and family regarding his plan going forward if consideration of stopping HD vs needing rehab and or long term care.     Asim Hogue MD  [facility]  04/15/25  11:53 EDT

## 2025-04-15 NOTE — PLAN OF CARE
Goal Outcome Evaluation:  Plan of Care Reviewed With: patient        Progress: no change  Outcome Evaluation: No acute events overnight.

## 2025-04-16 PROBLEM — E44.0 MODERATE PROTEIN-CALORIE MALNUTRITION: Status: ACTIVE | Noted: 2025-04-16

## 2025-04-16 LAB
ALBUMIN SERPL-MCNC: 3.1 G/DL (ref 3.5–5.2)
ANION GAP SERPL CALCULATED.3IONS-SCNC: 9.8 MMOL/L (ref 5–15)
BUN SERPL-MCNC: 55 MG/DL (ref 8–23)
BUN/CREAT SERPL: 12.1 (ref 7–25)
CALCIUM SPEC-SCNC: 8.9 MG/DL (ref 8.6–10.5)
CHLORIDE SERPL-SCNC: 102 MMOL/L (ref 98–107)
CO2 SERPL-SCNC: 23.2 MMOL/L (ref 22–29)
CREAT SERPL-MCNC: 4.54 MG/DL (ref 0.76–1.27)
EGFRCR SERPLBLD CKD-EPI 2021: 13.3 ML/MIN/1.73
GLUCOSE BLDC GLUCOMTR-MCNC: 135 MG/DL (ref 70–130)
GLUCOSE BLDC GLUCOMTR-MCNC: 173 MG/DL (ref 70–130)
GLUCOSE BLDC GLUCOMTR-MCNC: 182 MG/DL (ref 70–130)
GLUCOSE BLDC GLUCOMTR-MCNC: 235 MG/DL (ref 70–130)
GLUCOSE SERPL-MCNC: 132 MG/DL (ref 65–99)
PHOSPHATE SERPL-MCNC: 3.3 MG/DL (ref 2.5–4.5)
POTASSIUM SERPL-SCNC: 4.1 MMOL/L (ref 3.5–5.2)
SODIUM SERPL-SCNC: 135 MMOL/L (ref 136–145)

## 2025-04-16 PROCEDURE — 82948 REAGENT STRIP/BLOOD GLUCOSE: CPT

## 2025-04-16 PROCEDURE — G0378 HOSPITAL OBSERVATION PER HR: HCPCS

## 2025-04-16 PROCEDURE — G0257 UNSCHED DIALYSIS ESRD PT HOS: HCPCS

## 2025-04-16 PROCEDURE — 63710000001 INSULIN LISPRO (HUMAN) PER 5 UNITS: Performed by: NURSE PRACTITIONER

## 2025-04-16 PROCEDURE — 80069 RENAL FUNCTION PANEL: CPT | Performed by: INTERNAL MEDICINE

## 2025-04-16 RX ADMIN — MIDODRINE HYDROCHLORIDE 5 MG: 5 TABLET ORAL at 06:45

## 2025-04-16 RX ADMIN — INSULIN LISPRO 2 UNITS: 100 INJECTION, SOLUTION INTRAVENOUS; SUBCUTANEOUS at 13:46

## 2025-04-16 RX ADMIN — INSULIN LISPRO 4 UNITS: 100 INJECTION, SOLUTION INTRAVENOUS; SUBCUTANEOUS at 16:43

## 2025-04-16 RX ADMIN — PANTOPRAZOLE SODIUM 40 MG: 40 TABLET, DELAYED RELEASE ORAL at 06:45

## 2025-04-16 RX ADMIN — MIDODRINE HYDROCHLORIDE 5 MG: 5 TABLET ORAL at 16:43

## 2025-04-16 RX ADMIN — APIXABAN 2.5 MG: 2.5 TABLET, FILM COATED ORAL at 21:35

## 2025-04-16 RX ADMIN — ATORVASTATIN CALCIUM 80 MG: 80 TABLET, FILM COATED ORAL at 21:35

## 2025-04-16 RX ADMIN — MIDODRINE HYDROCHLORIDE 5 MG: 5 TABLET ORAL at 13:46

## 2025-04-16 RX ADMIN — SENNOSIDES AND DOCUSATE SODIUM 2 TABLET: 50; 8.6 TABLET ORAL at 21:35

## 2025-04-16 RX ADMIN — Medication 10 ML: at 21:22

## 2025-04-16 RX ADMIN — ACETAMINOPHEN 650 MG: 325 TABLET, FILM COATED ORAL at 04:45

## 2025-04-16 RX ADMIN — LEVOTHYROXINE SODIUM 75 MCG: 0.07 TABLET ORAL at 06:43

## 2025-04-16 RX ADMIN — PANTOPRAZOLE SODIUM 40 MG: 40 TABLET, DELAYED RELEASE ORAL at 16:43

## 2025-04-16 NOTE — NURSING NOTE
HD WITHOUT INCIDENT OR C/O. TOLERATED WELL. REMOVED 1 L . NO MEDS ADMINISTERED. AVF NEEDLES REMOVED X 2. HEMOSTASIS ACHIEVED. STABLE, NO C/O POST COMPLETION OF HD.

## 2025-04-16 NOTE — PLAN OF CARE
Goal Outcome Evaluation:              Outcome Evaluation: pt is A&Ox4, up with assist x2, dialysis done today, VSS, palliative care reconsulted per family, Will continue plan of care.

## 2025-04-16 NOTE — CONSULTS
Chap visited pt. Pt was very tired after dialysis.  Pt expressed thanks for the requested Bible matt brought and expressed contentment in his decision to go to rehab.  Pt asked to rest as he was very tired.  Chap remains available.

## 2025-04-16 NOTE — PROGRESS NOTES
[facility] Progress Note      Patient Name: Carlito Mo  : 1955  MRN: [id number]  Primary Care Physician:  Belle Simons APRN  Date of admission: 2025    Subjective     Interval History:   Follow-up ESRD.  On dialysis.  Blood flow rate 400.  Right upper extremity AV fistula.  Goal 1 kg.  Palliative care note reviewed.  Continue full support.  Review of Systems:   Eating.  Bowels moving.  Not short of breath.    Objective     Vitals:   Temp:  [97.5 °F (36.4 °C)-98 °F (36.7 °C)] 97.5 °F (36.4 °C)  Heart Rate:  [54-61] 54  Resp:  [16-20] 20  BP: (116-144)/(51-61) 144/51    Intake/Output Summary (Last 24 hours) at 2025 1028  Last data filed at 2025 0500  Gross per 24 hour   Intake 0 ml   Output --   Net 0 ml       Physical Exam:    General Appearance: Sleeping on dialysis but awakens easily.  Very frail and chronically ill.  .  Goal 1 kg.  Skin: warm and dry  HEENT: oral mucosa normal, nonicteric sclera  Neck: supple, no JVD  Lungs: Clear to auscultation bilaterally.  Breathing comfortably on room air  Heart: RRR, normal S1 and S2  Abdomen: soft, nontender, nondistended. +bs  : no palpable bladder  Extremities: no edema.  Right upper extremity AV fistula patent.  Neuro: normal speech and mental status     Scheduled Meds:     amiodarone, 200 mg, Oral, Q24H  apixaban, 2.5 mg, Oral, BID  aspirin, 81 mg, Oral, Daily  atorvastatin, 80 mg, Oral, Nightly  cetirizine, 10 mg, Oral, Daily  cholecalciferol, 5,000 Units, Oral, Daily  fluticasone, 2 spray, Each Nare, Daily  insulin lispro, 2-9 Units, Subcutaneous, 4x Daily AC & at Bedtime  levothyroxine, 75 mcg, Oral, Q AM  midodrine, 5 mg, Oral, TID AC  pantoprazole, 40 mg, Oral, BID AC  senna-docusate sodium, 2 tablet, Oral, BID  sodium chloride, 10 mL, Intravenous, Q12H  [Held by provider] tamsulosin, 0.4 mg, Oral, Daily      IV Meds:        Results Reviewed:   I have personally reviewed the results from the time of  this admission to 4/16/2025 10:28 EDT     Results from last 7 days   Lab Units 04/16/25  0630 04/15/25  0356 04/14/25  0356 04/13/25 0448 04/12/25  0101   SODIUM mmol/L 135* 136 130*   < > 138   POTASSIUM mmol/L 4.1 3.9 3.9   < > 3.4*   CHLORIDE mmol/L 102 103 100   < > 103   CO2 mmol/L 23.2 22.5 21.8*   < > 24.4   BUN mg/dL 55* 32* 63*   < > 24*   CREATININE mg/dL 4.54* 3.35* 5.31*   < > 2.86*   CALCIUM mg/dL 8.9 8.6 8.6   < > 8.7   BILIRUBIN mg/dL  --   --   --   --  0.3   ALK PHOS U/L  --   --   --   --  143*   ALT (SGPT) U/L  --   --   --   --  22   AST (SGOT) U/L  --   --   --   --  21   GLUCOSE mg/dL 132* 74 97   < > 227*    < > = values in this interval not displayed.       Estimated Creatinine Clearance: 13.8 mL/min (A) (by C-G formula based on SCr of 4.54 mg/dL (H)).    Results from last 7 days   Lab Units 04/16/25  0630 04/15/25  0356 04/14/25 0356 04/13/25 0448 04/12/25  0101   MAGNESIUM mg/dL  --   --   --   --  1.7   PHOSPHORUS mg/dL 3.3 2.7 3.5   < > 1.7*    < > = values in this interval not displayed.       Results from last 7 days   Lab Units 04/12/25  1226   URIC ACID mg/dL 2.8*       Results from last 7 days   Lab Units 04/15/25  0356 04/14/25 0356 04/13/25 0448 04/12/25  0101   WBC 10*3/mm3 5.95 6.47 9.05 4.64   HEMOGLOBIN g/dL 7.9* 7.7* 8.1* 8.4*   PLATELETS 10*3/mm3 259 244 255 258             Assessment / Plan     ASSESSMENT:  ESRD.  Hemodialysis today.  Hypotension on midodrine.  Blood pressure stable.  Falls, generalized weakness.  Diabetes mellitus type 2 with nephropathy, neuropathy.  Anemia of CKD.  Hemoglobin 7.9 yesterday.  Received IV iron with dialysis for 1425.    PLAN:  Hemodialysis today.    Thank you for involving us in the care of Carlito JANINE Mo.  Please feel free to call with any questions.    Zahida Hsu MD  04/16/25  10:28 EDT    [facility]  [phone]    Please note that portions of this note were completed with a voice recognition  program.

## 2025-04-16 NOTE — PROGRESS NOTES
Name: Carlito Mo ADMIT: 2025   : 1955  PCP: Belle Simons APRN    MRN: [id number] LOS: 0 days   AGE/SEX: 69 y.o. male  ROOM: [facility]   Subjective   Chief Complaint   Patient presents with    Fall     Patient is a 69 y.o. male ESRD on dialysis, CAD, Afib and h/o stroke on eliquis. He was seen last month here for aflutter, hypotension on midodrine, PNA and was discharged from here to rehab.     Discharged from rehab, had a fall at home. Imaging negative in the ER.    On HD today  No new complaints    ROS  No f/c  No n/v  No cp/palp  No soa/cough    Objective   Vital Signs  Temp:  [97.5 °F (36.4 °C)-98 °F (36.7 °C)] 97.7 °F (36.5 °C)  Heart Rate:  [54-61] 61  Resp:  [16-20] 16  BP: (116-144)/(51-68) 144/68  SpO2:  [99 %-100 %] 99 %  on   ;   Device (Oxygen Therapy): room air  Body mass index is 18.99 kg/m².    Physical Exam  HENT:      Head: Normocephalic and atraumatic.   Eyes:      General: No scleral icterus.  Pulmonary:      Effort: Pulmonary effort is normal. No respiratory distress.   Abdominal:      General: There is no distension.      Palpations: Abdomen is soft.      Tenderness: There is no abdominal tenderness.   Musculoskeletal:      Cervical back: Neck supple.   Neurological:      Mental Status: He is alert.   Psychiatric:         Behavior: Behavior normal.     Chronically ill  Muscle atrophy  On HD during exam    Results Review:       I reviewed the patient's new clinical results.  Results from last 7 days   Lab Units 04/15/25  0356 25  0356 25  0448 25  0101   WBC 10*3/mm3 5.95 6.47 9.05 4.64   HEMOGLOBIN g/dL 7.9* 7.7* 8.1* 8.4*   PLATELETS 10*3/mm3 259 244 255 258     Results from last 7 days   Lab Units 25  0630 04/15/25  0356 25  0356 25  0448   SODIUM mmol/L 135* 136 130* 134*   POTASSIUM mmol/L 4.1 3.9 3.9 3.7   CHLORIDE mmol/L 102 103 100 100   CO2 mmol/L 23.2 22.5 21.8* 22.8   BUN mg/dL 55* 32* 63* 46*   CREATININE mg/dL 4.54* 3.35* 5.31*  "4.27*   GLUCOSE mg/dL 132* 74 97 108*   Estimated Creatinine Clearance: 13.8 mL/min (A) (by C-G formula based on SCr of 4.54 mg/dL (H)).  Results from last 7 days   Lab Units 04/16/25  0630 04/15/25  0356 04/14/25  0356 04/13/25  0448 04/12/25  0101   ALBUMIN g/dL 3.1* 3.0* 3.1* 3.2* 3.5   BILIRUBIN mg/dL  --   --   --   --  0.3   ALK PHOS U/L  --   --   --   --  143*   AST (SGOT) U/L  --   --   --   --  21   ALT (SGPT) U/L  --   --   --   --  22     Results from last 7 days   Lab Units 04/16/25  0630 04/15/25  0356 04/14/25  0356 04/13/25  0448 04/12/25  0101   CALCIUM mg/dL 8.9 8.6 8.6 8.6 8.7   ALBUMIN g/dL 3.1* 3.0* 3.1* 3.2* 3.5   MAGNESIUM mg/dL  --   --   --   --  1.7   PHOSPHORUS mg/dL 3.3 2.7 3.5 3.2 1.7*         Coag     HbA1C   Lab Results   Component Value Date    HGBA1C 7.10 (H) 03/10/2025    HGBA1C 8 (H) 02/19/2025    HGBA1C 8.2 (H) 01/08/2025     Infection     Radiology(recent) No radiology results for the last day    No results found for: \"TROPONINT\", \"TROPONINI\", \"BNP\"  No components found for: \"TSH;2\"    amiodarone, 200 mg, Oral, Q24H  apixaban, 2.5 mg, Oral, BID  aspirin, 81 mg, Oral, Daily  atorvastatin, 80 mg, Oral, Nightly  cetirizine, 10 mg, Oral, Daily  cholecalciferol, 5,000 Units, Oral, Daily  fluticasone, 2 spray, Each Nare, Daily  insulin lispro, 2-9 Units, Subcutaneous, 4x Daily AC & at Bedtime  levothyroxine, 75 mcg, Oral, Q AM  midodrine, 5 mg, Oral, TID AC  pantoprazole, 40 mg, Oral, BID AC  senna-docusate sodium, 2 tablet, Oral, BID  sodium chloride, 10 mL, Intravenous, Q12H  [Held by provider] tamsulosin, 0.4 mg, Oral, Daily       Diet: Diabetic; Consistent Carbohydrate; Fluid Consistency: Thin (IDDSI 0)      Assessment & Plan      Active Hospital Problems    Diagnosis  POA    **Frequent falls [R29.6]  Not Applicable    Moderate protein-calorie malnutrition [E44.0]  Yes    Chronic HFrEF (heart failure with reduced ejection fraction) [I50.22]  Yes    ESRD (end stage renal disease) " [N18.6]  Yes    History of stroke [Z86.73]  Not Applicable    Paroxysmal atrial fibrillation [I48.0]  Yes    Coronary artery disease involving native coronary artery of native heart without angina pectoris [I25.10]  Yes    Benign prostatic hyperplasia with nocturia [N40.1, R35.1]  Yes    Type 2 diabetes mellitus, with long-term current use of insulin [E11.9, Z79.4]  Not Applicable      Resolved Hospital Problems   No resolved problems to display.       Patient is a 69 y.o. male ESRD on dialysis, CAD, Afib and h/o stroke on eliquis. He was seen last month here for aflutter, hypotension on midodrine, PNA and was discharged from here to rehab.     Discharged from rehab, had a fall at home. Imaging negative in the ER.     PT/OT/CCP consult  Sliding scale insulin  Resumed home asa and eliquis  Home midodrine  Nephrology following for HD  chronic anemia  Agents for constipation  agents for sinus congestion  I discussed the patients findings and my recommendations with patient.     VTE Prophylaxis - eliquis    Patient does not want palliative care at this time and wants to continue HD and go to rehab to get stronger.     Asim Hogue MD  [facility]  04/16/25  11:53 EDT

## 2025-04-16 NOTE — SIGNIFICANT NOTE
04/16/25 1044   OTHER   Discipline occupational therapist   Rehab Time/Intention   Session Not Performed patient unavailable for treatment  (Pt off floor for HD. Will f/u as appropriate next service date.)   Recommendation   OT - Next Appointment 04/17/25

## 2025-04-17 LAB
GLUCOSE BLDC GLUCOMTR-MCNC: 109 MG/DL (ref 70–130)
GLUCOSE BLDC GLUCOMTR-MCNC: 141 MG/DL (ref 70–130)
GLUCOSE BLDC GLUCOMTR-MCNC: 227 MG/DL (ref 70–130)
GLUCOSE BLDC GLUCOMTR-MCNC: 256 MG/DL (ref 70–130)

## 2025-04-17 PROCEDURE — G0378 HOSPITAL OBSERVATION PER HR: HCPCS

## 2025-04-17 PROCEDURE — 97530 THERAPEUTIC ACTIVITIES: CPT

## 2025-04-17 PROCEDURE — 97535 SELF CARE MNGMENT TRAINING: CPT

## 2025-04-17 PROCEDURE — 63710000001 INSULIN LISPRO (HUMAN) PER 5 UNITS: Performed by: NURSE PRACTITIONER

## 2025-04-17 PROCEDURE — 82948 REAGENT STRIP/BLOOD GLUCOSE: CPT

## 2025-04-17 RX ORDER — MIDODRINE HYDROCHLORIDE 2.5 MG/1
2.5 TABLET ORAL
Status: DISCONTINUED | OUTPATIENT
Start: 2025-04-17 | End: 2025-04-19 | Stop reason: HOSPADM

## 2025-04-17 RX ADMIN — PANTOPRAZOLE SODIUM 40 MG: 40 TABLET, DELAYED RELEASE ORAL at 08:15

## 2025-04-17 RX ADMIN — SENNOSIDES AND DOCUSATE SODIUM 2 TABLET: 50; 8.6 TABLET ORAL at 20:57

## 2025-04-17 RX ADMIN — ASPIRIN 81 MG: 81 TABLET, COATED ORAL at 08:16

## 2025-04-17 RX ADMIN — FLUTICASONE PROPIONATE 2 SPRAY: 50 SPRAY, METERED NASAL at 08:17

## 2025-04-17 RX ADMIN — CETIRIZINE HYDROCHLORIDE 10 MG: 10 TABLET, FILM COATED ORAL at 08:16

## 2025-04-17 RX ADMIN — Medication 10 ML: at 20:57

## 2025-04-17 RX ADMIN — MIDODRINE HYDROCHLORIDE 2.5 MG: 2.5 TABLET ORAL at 18:35

## 2025-04-17 RX ADMIN — ATORVASTATIN CALCIUM 80 MG: 80 TABLET, FILM COATED ORAL at 20:57

## 2025-04-17 RX ADMIN — INSULIN LISPRO 4 UNITS: 100 INJECTION, SOLUTION INTRAVENOUS; SUBCUTANEOUS at 20:57

## 2025-04-17 RX ADMIN — MIDODRINE HYDROCHLORIDE 5 MG: 5 TABLET ORAL at 08:16

## 2025-04-17 RX ADMIN — Medication 10 ML: at 09:00

## 2025-04-17 RX ADMIN — APIXABAN 2.5 MG: 2.5 TABLET, FILM COATED ORAL at 08:16

## 2025-04-17 RX ADMIN — LEVOTHYROXINE SODIUM 75 MCG: 0.07 TABLET ORAL at 08:16

## 2025-04-17 RX ADMIN — APIXABAN 2.5 MG: 2.5 TABLET, FILM COATED ORAL at 20:57

## 2025-04-17 RX ADMIN — PANTOPRAZOLE SODIUM 40 MG: 40 TABLET, DELAYED RELEASE ORAL at 18:35

## 2025-04-17 RX ADMIN — Medication 5000 UNITS: at 08:16

## 2025-04-17 RX ADMIN — AMIODARONE HYDROCHLORIDE 200 MG: 200 TABLET ORAL at 08:16

## 2025-04-17 RX ADMIN — MIDODRINE HYDROCHLORIDE 2.5 MG: 2.5 TABLET ORAL at 13:28

## 2025-04-17 RX ADMIN — INSULIN LISPRO 6 UNITS: 100 INJECTION, SOLUTION INTRAVENOUS; SUBCUTANEOUS at 08:14

## 2025-04-17 NOTE — PLAN OF CARE
Goal Outcome Evaluation:  Plan of Care Reviewed With: patient           Outcome Evaluation: Patient seen for PT session this PM. Patient supine in bed upon arrival. Patient reports generalized fatigue but agreeable to attempt OOB activity. Patient required Shakeel and increased time to sit up to EOB. Patient with fwd flexed posture. Cues needed to look up. Patient stood from EOB with Shakeel-modA. Patient took a few side steps to the HOB with Shakeel and HHA. Patient unsteady with kyphotic posture and patient reaching outside of ADRIEL for furniture. ModA needed to safely return to sitting at EOB. Patient would continue to benefit from skilled PT intervention to address deficits in functional mobility. PT will continue to monitor.

## 2025-04-17 NOTE — CASE MANAGEMENT/SOCIAL WORK
Continued Stay Note  [facility]     Patient Name: Carlito Mo  MRN: [id number]  Today's Date: 4/17/2025    Admit Date: 4/12/2025    Plan: [facility] accpeted, awaiting approval from family to proceed   Discharge Plan       Row Name 04/17/25 1525       Plan    Plan [facility] accpeted, awaiting approval from family to proceed    Plan Comments Spoke with patient's spouse via outbound. Introduced self and reviewed discharge plan. Explained to spouse that only accepting facility is [facility]. Spouse states she needs to speak with the patient prior to decision. CCP requested notification when choice made.                   Discharge Codes    No documentation.                 Expected Discharge Date and Time       Expected Discharge Date Expected Discharge Time    Apr 17, 2025               Marely Castro RN

## 2025-04-17 NOTE — PLAN OF CARE
The pt participated in OT this morning. He completed bed mobility with SBA. Total A for brief management and Min A for moiz hygiene from supine. Once sitting at the EOB he demonstrated a strong forward lean at the EOB. Total A LBD. The pt stood with Mod A and took side steps towards the bedside recliner chair with Min A. S/S of grooming items were prepped on his bedside table. SNF is recommended.

## 2025-04-17 NOTE — PROGRESS NOTES
Name: Carlito Mo ADMIT: 2025   : 1955  PCP: Belle Simons APRN    MRN: [id number] LOS: 0 days   AGE/SEX: 69 y.o. male  ROOM: [facility]   Subjective   Chief Complaint   Patient presents with    Fall     Patient is a 69 y.o. male ESRD on dialysis, CAD, Afib and h/o stroke on eliquis. He was seen last month here for aflutter, hypotension on midodrine, PNA and was discharged from here to rehab.     Discharged from rehab, had a fall at home. Imaging negative in the ER.    HD yesterday  Some fatigue  Worked with therapy  Some sinus congestion      ROS  No f/c  No n/v  No cp/palp  No soa/cough    Objective   Vital Signs  Temp:  [97.7 °F (36.5 °C)-98.6 °F (37 °C)] 98.1 °F (36.7 °C)  Heart Rate:  [60-71] 60  Resp:  [16-18] 16  BP: (119-134)/(46-74) 119/46  SpO2:  [96 %-98 %] 98 %  on   ;   Device (Oxygen Therapy): room air  Body mass index is 18.99 kg/m².    Physical Exam  HENT:      Head: Normocephalic and atraumatic.   Eyes:      General: No scleral icterus.  Pulmonary:      Effort: Pulmonary effort is normal. No respiratory distress.   Abdominal:      General: There is no distension.      Palpations: Abdomen is soft.      Tenderness: There is no abdominal tenderness.   Musculoskeletal:      Cervical back: Neck supple.   Neurological:      Mental Status: He is alert.   Psychiatric:         Behavior: Behavior normal.     Chronically ill  Muscle atrophy      Results Review:       I reviewed the patient's new clinical results.  Results from last 7 days   Lab Units 04/15/25  0356 25  0356 25  0448 25  0101   WBC 10*3/mm3 5.95 6.47 9.05 4.64   HEMOGLOBIN g/dL 7.9* 7.7* 8.1* 8.4*   PLATELETS 10*3/mm3 259 244 255 258     Results from last 7 days   Lab Units 25  0630 04/15/25  0356 25  0356 25  0448   SODIUM mmol/L 135* 136 130* 134*   POTASSIUM mmol/L 4.1 3.9 3.9 3.7   CHLORIDE mmol/L 102 103 100 100   CO2 mmol/L 23.2 22.5 21.8* 22.8   BUN mg/dL 55* 32* 63* 46*   CREATININE  "mg/dL 4.54* 3.35* 5.31* 4.27*   GLUCOSE mg/dL 132* 74 97 108*   Estimated Creatinine Clearance: 13.8 mL/min (A) (by C-G formula based on SCr of 4.54 mg/dL (H)).  Results from last 7 days   Lab Units 04/16/25  0630 04/15/25  0356 04/14/25  0356 04/13/25  0448 04/12/25  0101   ALBUMIN g/dL 3.1* 3.0* 3.1* 3.2* 3.5   BILIRUBIN mg/dL  --   --   --   --  0.3   ALK PHOS U/L  --   --   --   --  143*   AST (SGOT) U/L  --   --   --   --  21   ALT (SGPT) U/L  --   --   --   --  22     Results from last 7 days   Lab Units 04/16/25  0630 04/15/25  0356 04/14/25  0356 04/13/25  0448 04/12/25  0101   CALCIUM mg/dL 8.9 8.6 8.6 8.6 8.7   ALBUMIN g/dL 3.1* 3.0* 3.1* 3.2* 3.5   MAGNESIUM mg/dL  --   --   --   --  1.7   PHOSPHORUS mg/dL 3.3 2.7 3.5 3.2 1.7*         Coag     HbA1C   Lab Results   Component Value Date    HGBA1C 7.10 (H) 03/10/2025    HGBA1C 8 (H) 02/19/2025    HGBA1C 8.2 (H) 01/08/2025     Infection     Radiology(recent) No radiology results for the last day    No results found for: \"TROPONINT\", \"TROPONINI\", \"BNP\"  No components found for: \"TSH;2\"    amiodarone, 200 mg, Oral, Q24H  apixaban, 2.5 mg, Oral, BID  aspirin, 81 mg, Oral, Daily  atorvastatin, 80 mg, Oral, Nightly  cetirizine, 10 mg, Oral, Daily  cholecalciferol, 5,000 Units, Oral, Daily  fluticasone, 2 spray, Each Nare, Daily  insulin lispro, 2-9 Units, Subcutaneous, 4x Daily AC & at Bedtime  levothyroxine, 75 mcg, Oral, Q AM  midodrine, 2.5 mg, Oral, TID AC  pantoprazole, 40 mg, Oral, BID AC  senna-docusate sodium, 2 tablet, Oral, BID  sodium chloride, 10 mL, Intravenous, Q12H  [Held by provider] tamsulosin, 0.4 mg, Oral, Daily       Diet: Diabetic; Consistent Carbohydrate; Fluid Consistency: Thin (IDDSI 0)      Assessment & Plan      Active Hospital Problems    Diagnosis  POA    **Frequent falls [R29.6]  Not Applicable    Moderate protein-calorie malnutrition [E44.0]  Yes    Chronic HFrEF (heart failure with reduced ejection fraction) [I50.22]  Yes    ESRD " (end stage renal disease) [N18.6]  Yes    History of stroke [Z86.73]  Not Applicable    Paroxysmal atrial fibrillation [I48.0]  Yes    Coronary artery disease involving native coronary artery of native heart without angina pectoris [I25.10]  Yes    Benign prostatic hyperplasia with nocturia [N40.1, R35.1]  Yes    Type 2 diabetes mellitus, with long-term current use of insulin [E11.9, Z79.4]  Not Applicable      Resolved Hospital Problems   No resolved problems to display.       Patient is a 69 y.o. male ESRD on dialysis, CAD, Afib and h/o stroke on eliquis. He was seen last month here for aflutter, hypotension on midodrine, PNA and was discharged from here to rehab.     Discharged from rehab, had a fall at home. Imaging negative in the ER.     PT/OT/CCP consult  Sliding scale insulin  Resumed home asa and eliquis  on midodrine  Nephrology following for HD  chronic anemia  Agents for constipation  agents for sinus congestion  I discussed the patients findings and my recommendations with patient.     VTE Prophylaxis - eliquis    Rehab facility and precert pending     Asim Hogue MD  [facility]  04/17/25  11:53 EDT

## 2025-04-17 NOTE — PROGRESS NOTES
Nutrition Services    Patient Name: Carlito Mo  YOB: 1955  MRN: [id number]  Admission date: 4/12/2025    PROGRESS NOTE      Encounter Information: Checking in on PO intake. Palliative care met with pt and family 4/15, pt elected to continue HD at that time. Palliative re-consulted per family's wishes 4/16.        PO Diet: Diet: Diabetic; Consistent Carbohydrate; Fluid Consistency: Thin (IDDSI 0)   PO Supplements: Boost Glucose Control TID (Provides 570 kcals, 48 g protein if consumed)   Mighty Shake q daily (Provides 220 kcals, 6 g protein if consumed)     PO Intake:  75%       Current nutrition support: -   Nutrition support review: -       Weight: Weight: 63.5 kg (140 lb) (04/12/25 0047)       Medications: reviewed vitamin D, insulin, protonix, pericolace   Labs: reviewed        GI Function:  last bowel movement: 4/15       Nutrition Intervention Updates: Continue current diet and ONS. Monitor for changes in GOC.       Results from last 7 days   Lab Units 04/16/25  0630 04/15/25  0356 04/14/25  0356 04/13/25  0448 04/12/25  0101   SODIUM mmol/L 135* 136 130*   < > 138   POTASSIUM mmol/L 4.1 3.9 3.9   < > 3.4*   CHLORIDE mmol/L 102 103 100   < > 103   CO2 mmol/L 23.2 22.5 21.8*   < > 24.4   BUN mg/dL 55* 32* 63*   < > 24*   CREATININE mg/dL 4.54* 3.35* 5.31*   < > 2.86*   CALCIUM mg/dL 8.9 8.6 8.6   < > 8.7   BILIRUBIN mg/dL  --   --   --   --  0.3   ALK PHOS U/L  --   --   --   --  143*   ALT (SGPT) U/L  --   --   --   --  22   AST (SGOT) U/L  --   --   --   --  21   GLUCOSE mg/dL 132* 74 97   < > 227*    < > = values in this interval not displayed.     Results from last 7 days   Lab Units 04/16/25  0630 04/15/25  0356 04/13/25 0448 04/12/25  0101   MAGNESIUM mg/dL  --   --   --  1.7   PHOSPHORUS mg/dL 3.3 2.7   < > 1.7*   HEMOGLOBIN g/dL  --  7.9*   < > 8.4*   HEMATOCRIT %  --  24.8*   < > 27.5*    < > = values in this interval not displayed.     COVID19   Date Value Ref Range Status    05/23/2024 Not Detected Not Detected - Ref. Range Final     Lab Results   Component Value Date    HGBA1C 7.10 (H) 03/10/2025       RD to follow up per protocol.    Electronically signed by:  Kristin Galicia RD  04/17/25 08:26 EDT

## 2025-04-17 NOTE — PROGRESS NOTES
[facility] Progress Note      Patient Name: Carlito oM  : 1955  MRN: [id number]  Primary Care Physician:  Belle Simons APRN  Date of admission: 2025    Subjective     Interval History:   Follow-up ESRD.  Dialyzed yesterday.  1 L removed.  Fatigued afterward.  Blood pressure stable.  Review of Systems:   Eating.  Bowels moving.  Not short of breath.  Denies pain    Objective     Vitals:   Temp:  [97.7 °F (36.5 °C)-98 °F (36.7 °C)] 97.9 °F (36.6 °C)  Heart Rate:  [60-62] 60  Resp:  [16-18] 18  BP: (125-144)/(59-68) 134/59    Intake/Output Summary (Last 24 hours) at 2025 1008  Last data filed at 2025 0755  Gross per 24 hour   Intake 1090 ml   Output 1125 ml   Net -35 ml       Physical Exam:    General Appearance: Awake sitting up watching television.  Chronically ill.  Skin: warm and dry  HEENT: oral mucosa normal, nonicteric sclera  Neck: supple, no JVD  Lungs: Clear to auscultation bilaterally.  Breathing comfortably on room air  Heart: RRR, normal S1 and S2  Abdomen: soft, nontender, nondistended. +bs  : no palpable bladder  Extremities: no edema.  Right upper extremity AV fistula patent.  Neuro: normal speech and mental status     Scheduled Meds:     amiodarone, 200 mg, Oral, Q24H  apixaban, 2.5 mg, Oral, BID  aspirin, 81 mg, Oral, Daily  atorvastatin, 80 mg, Oral, Nightly  cetirizine, 10 mg, Oral, Daily  cholecalciferol, 5,000 Units, Oral, Daily  fluticasone, 2 spray, Each Nare, Daily  insulin lispro, 2-9 Units, Subcutaneous, 4x Daily AC & at Bedtime  levothyroxine, 75 mcg, Oral, Q AM  midodrine, 5 mg, Oral, TID AC  pantoprazole, 40 mg, Oral, BID AC  senna-docusate sodium, 2 tablet, Oral, BID  sodium chloride, 10 mL, Intravenous, Q12H  [Held by provider] tamsulosin, 0.4 mg, Oral, Daily      IV Meds:        Results Reviewed:   I have personally reviewed the results from the time of this admission to 2025 10:08 EDT     Results from last 7 days   Lab  Units 04/16/25  0630 04/15/25  0356 04/14/25  0356 04/13/25 0448 04/12/25  0101   SODIUM mmol/L 135* 136 130*   < > 138   POTASSIUM mmol/L 4.1 3.9 3.9   < > 3.4*   CHLORIDE mmol/L 102 103 100   < > 103   CO2 mmol/L 23.2 22.5 21.8*   < > 24.4   BUN mg/dL 55* 32* 63*   < > 24*   CREATININE mg/dL 4.54* 3.35* 5.31*   < > 2.86*   CALCIUM mg/dL 8.9 8.6 8.6   < > 8.7   BILIRUBIN mg/dL  --   --   --   --  0.3   ALK PHOS U/L  --   --   --   --  143*   ALT (SGPT) U/L  --   --   --   --  22   AST (SGOT) U/L  --   --   --   --  21   GLUCOSE mg/dL 132* 74 97   < > 227*    < > = values in this interval not displayed.       Estimated Creatinine Clearance: 13.8 mL/min (A) (by C-G formula based on SCr of 4.54 mg/dL (H)).    Results from last 7 days   Lab Units 04/16/25  0630 04/15/25  0356 04/14/25 0356 04/13/25 0448 04/12/25  0101   MAGNESIUM mg/dL  --   --   --   --  1.7   PHOSPHORUS mg/dL 3.3 2.7 3.5   < > 1.7*    < > = values in this interval not displayed.       Results from last 7 days   Lab Units 04/12/25  1226   URIC ACID mg/dL 2.8*       Results from last 7 days   Lab Units 04/15/25  0356 04/14/25  0356 04/13/25 0448 04/12/25  0101   WBC 10*3/mm3 5.95 6.47 9.05 4.64   HEMOGLOBIN g/dL 7.9* 7.7* 8.1* 8.4*   PLATELETS 10*3/mm3 259 244 255 258             Assessment / Plan     ASSESSMENT:  ESRD.  Hemodialysis tomorrow.  Hypotension on midodrine.  Blood pressure stable.  Reduce midodrine to 2.5 mg 3 times daily.  Falls, generalized weakness.  Diabetes mellitus type 2 with nephropathy, neuropathy.  Anemia of CKD.  Hemoglobin 7.9 on last check.  Received IV iron with dialysis 4/14/25.     PLAN:  Hemodialysis tomorrow.  Reduce midodrine to 2.5 mg 3 times daily.    Thank you for involving us in the care of Carlito Mo.  Please feel free to call with any questions.    Zahida Hsu MD  04/17/25  10:08 EDT    [facility]  [phone]    Please note that portions of this note were completed with a  voice recognition program.

## 2025-04-17 NOTE — THERAPY TREATMENT NOTE
Patient Name: Carlito Mo  : 1955    MRN: [id number]                              Today's Date: 2025       Admit Date: 2025    Visit Dx:     ICD-10-CM ICD-9-CM   1. Frequent falls  R29.6 V15.88   2. Closed head injury, initial encounter  S09.90XA 959.01   3. Chronic anticoagulation  Z79.01 V58.61   4. Hypophosphatemia  E83.39 275.3   5. Hypokalemia  E87.6 276.8   6. Multifactorial functional impairment  R53.81 799.3     Patient Active Problem List   Diagnosis    Major depressive disorder with single episode, in partial remission    Other hyperlipidemia    Type 2 diabetes mellitus, with long-term current use of insulin    Vitamin D deficiency    Erectile dysfunction    Chronic fatigue    Type 2 diabetes mellitus with ophthalmic complication    Benign prostatic hyperplasia with nocturia    History of basal cell carcinoma    Acquired hypothyroidism    Recurrent strokes    Suspected sleep apnea    YAW (obstructive sleep apnea)    Coronary artery disease involving native coronary artery of native heart without angina pectoris    Chronically elevated troponin    Colon cancer screening    Enlarged lymph nodes    Functional gait abnormality    History of myocardial infarction    Insomnia    Iron deficiency anemia    Major depression, recurrent    Essential hypertension    Acute on chronic renal insufficiency    Falls frequently    Acute on chronic anemia    Neurogenic orthostatic hypotension    Anemia, chronic disease    History of colon polyps    Helicobacter pylori gastritis    Esophagitis    Embolic stroke    Patent foramen ovale    Cardiomyopathy    Diarrhea    Generalized weakness    Paroxysmal atrial fibrillation    Chronic anticoagulation    Acute ischemic stroke    History of stroke    Iron deficiency anemia    Acute HFrEF (heart failure with reduced ejection fraction)    ESRD (end stage renal disease)    Hemoptysis    Chronic HFrEF (heart failure with reduced ejection fraction)    Hemodialysis  patient    Exertional dyspnea    Severe malnutrition    Ataxia    Cerebellar stroke, acute    Medically noncompliant    Vertebral artery stenosis, bilateral    Carotid stenosis, right    Ileus    Frequent falls    Moderate protein-calorie malnutrition     Past Medical History:   Diagnosis Date    Acquired hypothyroidism     Acute sinusitis     SYLVAIN (acute kidney injury)     on CKD    Anemia     Arthritis     Atrial fibrillation     CAD (coronary artery disease)     Chronic combined systolic and diastolic congestive heart failure     COPD (chronic obstructive pulmonary disease)     Depression     Diabetic neuropathy 04/11/2022    Diabetes mellitus due to underlying condition    Esophagitis 06/10/2020    Added automatically from request for surgery [id number]      ESRD (end stage renal disease) 12/01/2023    Frequent PVCs     Generalized weakness     GERD (gastroesophageal reflux disease)     Helicobacter pylori gastritis 06/04/2020    Hyperlipidemia     Hypertension     Hypertensive encephalopathy     Pleural effusion     Pneumonia     Poor historian     RBBB (right bundle branch block)     Stroke     POOR VISION    TIA (transient ischemic attack)     Type 2 diabetes mellitus     Urinary retention     Vitamin D deficiency      Past Surgical History:   Procedure Laterality Date    APPENDECTOMY N/A 02/14/2016    Dr. Alexey Dodson    ARTERIOVENOUS FISTULA/SHUNT SURGERY Right 06/03/2022    Procedure: RIGHT FOREARM ARTERIOVENOUS FISTULA PLACEMENT RADIOCEPHALIC WITH CEPHALIC VEIN TRANSPOSITION;  Surgeon: Eliseo Willis MD;  Location: [facility] OR;  Service: Vascular;  Laterality: Right;    COLONOSCOPY      over 20 years ago.  no polyps     COLONOSCOPY N/A 09/18/2018    Procedure: COLONOSCOPY;  Surgeon: Jose Enrique Louie MD;  Location: [facility] ENDOSCOPY;  Service: Gastroenterology    COLONOSCOPY N/A 09/19/2018    IH, EH, polyps (TAs w/low grade dysplasia)    COLONOSCOPY N/A 06/01/2020    Procedure: COLONOSCOPY;   Surgeon: Jose Enrique Louie MD;  Location:  [facility];  Service: Gastroenterology;  Laterality: N/A;  Pre op-Anemia, Melena, History of Polyps  Post op-To Cecum/TI, Polyp, Poor Prep    CORONARY ARTERY BYPASS GRAFT  11/2001    ENDOSCOPY N/A 05/29/2020    Procedure: ESOPHAGOGASTRODUODENOSCOPY with biopsies;  Surgeon: Amanda Lovelace MD;  Location:  [facility];  Service: Gastroenterology;  Laterality: N/A;  pre-anemia, dark stools  post-esophagitis, hiatal hernia    ENDOSCOPY N/A 09/15/2020    Procedure: ESOPHAGOGASTRODUODENOSCOPY WITH BIOPSIES;  Surgeon: Jose Enrique Louie MD;  Location: [facility] ENDOSCOPY;  Service: Gastroenterology;  Laterality: N/A;  pre: history of melena and esophagitis  post: mild esophagitis and gastritis, small hiatal hernia    ENDOSCOPY N/A 12/4/2023    Procedure: ESOPHAGOGASTRODUODENOSCOPY with bx;  Surgeon: Quoc Elmore MD;  Location: [facility] ENDOSCOPY;  Service: Gastroenterology;  Laterality: N/A;  pre: Coffee-ground emesis  post: hiatal hernia, esophagitis    HERNIA REPAIR Left 12/05/2019    THORACENTESIS      TONSILLECTOMY      VASECTOMY        General Information       Row Name 04/17/25 1317          OT Time and Intention    Subjective Information no complaints  -RB     Document Type therapy note (daily note)  -RB     Mode of Treatment individual therapy;occupational therapy  -RB     Patient Effort good  -RB       Row Name 04/17/25 1317          Cognition    Orientation Status (Cognition) oriented x 3  -RB               User Key  (r) = Recorded By, (t) = Taken By, (c) = Cosigned By      Initials Name Provider Type    RB Katie Starr OT Occupational Therapist                     Mobility/ADL's       Row Name 04/17/25 1312          Bed Mobility    Bed Mobility supine-sit;rolling right;rolling left  -RB     Rolling Left [place] (Bed Mobility) standby assist;verbal cues  -RB     Rolling Right [place] (Bed Mobility) standby assist;verbal cues  -RB      Supine-Sit [place] (Bed Mobility) standby assist;verbal cues  -RB     Assistive Device (Bed Mobility) bed rails  -RB     Comment, (Bed Mobility) brief change from supine  -RB       Row Name 04/17/25 1312          Transfers    Transfers sit-stand transfer;stand-sit transfer;bed-chair transfer  -RB       Row Name 04/17/25 1312          Bed-Chair Transfer    Bed-Chair [place] (Transfers) minimum assist (75% patient effort);1 person assist;verbal cues  -RB     Comment, (Bed-Chair Transfer) hha  -RB       Row Name 04/17/25 1312          Sit-Stand Transfer    Sit-Stand [place] (Transfers) moderate assist (50% patient effort);1 person assist;verbal cues  -RB     Comment, (Sit-Stand Transfer) hha  -RB       Row Name 04/17/25 1312          Stand-Sit Transfer    Stand-Sit [place] (Transfers) moderate assist (50% patient effort);1 person assist;verbal cues  -RB     Comment, (Stand-Sit Transfer) hha  -RB       Row Name 04/17/25 1312          Activities of Daily Living    BADL Assessment/Intervention lower body dressing;toileting  -RB       Row Name 04/17/25 1312          Lower Body Dressing Assessment/Training    [place] Level (Lower Body Dressing) lower body dressing skills;dependent (less than 25% patient effort)  -RB     Position (Lower Body Dressing) edge of bed sitting  -RB       Row Name 04/17/25 1312          Toileting Assessment/Training    [place] Level (Toileting) toileting skills;change pad/brief;adjust/manage clothing;perform perineal hygiene;dependent (less than 25% patient effort)  -RB     Position (Toileting) supine  -RB     Comment, (Toileting) <25% pt assisted, unaware brief needed changed  -RB               User Key  (r) = Recorded By, (t) = Taken By, (c) = Cosigned By      Initials Name Provider Type    RB Katie Starr OT Occupational Therapist                   Obj/Interventions       Row Name 04/17/25 1311          Balance    Comment, Balance SBA/CGA sitting balance,  forward flexed lean, general standing unsteadiness was present when transferring over to his bedside chair  -RB               User Key  (r) = Recorded By, (t) = Taken By, (c) = Cosigned By      Initials Name Provider Type    Katie Perez OT Occupational Therapist                   Goals/Plan    No documentation.                  Clinical Impression       Row Name 04/17/25 1311          Pain Assessment    Pretreatment Pain Rating 0/10 - no pain  -RB     Posttreatment Pain Rating 0/10 - no pain  -RB       Row Name 04/17/25 1311          Plan of Care Review    Plan of Care Reviewed With patient  -RB     Progress no change  -RB     Outcome Evaluation The pt participated in OT this morning. He completed bed mobility with SBA. Total A for brief management and Min A for moiz hygiene from supine. Once sitting at the EOB he demonstrated a strong forward lean at the EOB. Total A LBD. The pt stood with Mod A and took side steps towards the bedside recliner chair with Min A. S/S of grooming items were prepped on his bedside table. SNF is recommended.  -RB       Row Name 04/17/25 1311          Therapy Assessment/Plan (OT)    Rehab Potential (OT) good  -RB     Criteria for Skilled Therapeutic Interventions Met (OT) yes;skilled treatment is necessary  -RB     Therapy Frequency (OT) 5 times/wk  -RB       Row Name 04/17/25 1311          Therapy Plan Review/Discharge Plan (OT)    Anticipated Discharge Disposition (OT) skilled nursing facility  -RB       Row Name 04/17/25 1311          Positioning and Restraints    Pre-Treatment Position in bed  -RB     Post Treatment Position chair  -RB     In Chair notified nsg;reclined;sitting;call light within reach;encouraged to call for assist;legs elevated  -RB               User Key  (r) = Recorded By, (t) = Taken By, (c) = Cosigned By      Initials Name Provider Type    Katie Perez OT Occupational Therapist                   Outcome Measures    No documentation.                      OT Recommendation and Plan  Therapy Frequency (OT): 5 times/wk  Plan of Care Review  Plan of Care Reviewed With: patient  Progress: no change  Outcome Evaluation: The pt participated in OT this morning. He completed bed mobility with SBA. Total A for brief management and Min A for moiz hygiene from supine. Once sitting at the EOB he demonstrated a strong forward lean at the EOB. Total A LBD. The pt stood with Mod A and took side steps towards the bedside recliner chair with Min A. S/S of grooming items were prepped on his bedside table. SNF is recommended.     Time Calculation:         Time Calculation- OT       Row Name 04/17/25 1311             Time Calculation- OT    OT Start Time 0845  -RB      OT Stop Time 0912  -RB      OT Time Calculation (min) 27 min  -RB      Total Timed Code Minutes- OT 27 minute(s)  -RB      OT Received On 04/17/25  -RB      OT - Next Appointment 04/18/25  -RB         Timed Charges    21491 - OT Therapeutic Activity Minutes 10  -RB      21373 - OT Self Care/Mgmt Minutes 17  -RB         Total Minutes    Timed Charges Total Minutes 27  -RB       Total Minutes 27  -RB                User Key  (r) = Recorded By, (t) = Taken By, (c) = Cosigned By      Initials Name Provider Type    RB Katie Starr OT Occupational Therapist                  Therapy Charges for Today       Code Description Service Date Service Provider Modifiers Qty    93490417328 HC OT THERAPEUTIC ACT EA 15 MIN 4/17/2025 Katie Starr OT GO 1    65775227812 HC OT SELF CARE/MGMT/TRAIN EA 15 MIN 4/17/2025 Katie Starr OT GO 1                 Katie Starr OT  4/17/2025

## 2025-04-17 NOTE — THERAPY TREATMENT NOTE
Patient Name: Carlito Mo  : 1955    MRN: [id number]                              Today's Date: 2025       Admit Date: 2025    Visit Dx:     ICD-10-CM ICD-9-CM   1. Frequent falls  R29.6 V15.88   2. Closed head injury, initial encounter  S09.90XA 959.01   3. Chronic anticoagulation  Z79.01 V58.61   4. Hypophosphatemia  E83.39 275.3   5. Hypokalemia  E87.6 276.8   6. Multifactorial functional impairment  R53.81 799.3     Patient Active Problem List   Diagnosis    Major depressive disorder with single episode, in partial remission    Other hyperlipidemia    Type 2 diabetes mellitus, with long-term current use of insulin    Vitamin D deficiency    Erectile dysfunction    Chronic fatigue    Type 2 diabetes mellitus with ophthalmic complication    Benign prostatic hyperplasia with nocturia    History of basal cell carcinoma    Acquired hypothyroidism    Recurrent strokes    Suspected sleep apnea    YAW (obstructive sleep apnea)    Coronary artery disease involving native coronary artery of native heart without angina pectoris    Chronically elevated troponin    Colon cancer screening    Enlarged lymph nodes    Functional gait abnormality    History of myocardial infarction    Insomnia    Iron deficiency anemia    Major depression, recurrent    Essential hypertension    Acute on chronic renal insufficiency    Falls frequently    Acute on chronic anemia    Neurogenic orthostatic hypotension    Anemia, chronic disease    History of colon polyps    Helicobacter pylori gastritis    Esophagitis    Embolic stroke    Patent foramen ovale    Cardiomyopathy    Diarrhea    Generalized weakness    Paroxysmal atrial fibrillation    Chronic anticoagulation    Acute ischemic stroke    History of stroke    Iron deficiency anemia    Acute HFrEF (heart failure with reduced ejection fraction)    ESRD (end stage renal disease)    Hemoptysis    Chronic HFrEF (heart failure with reduced ejection fraction)    Hemodialysis  patient    Exertional dyspnea    Severe malnutrition    Ataxia    Cerebellar stroke, acute    Medically noncompliant    Vertebral artery stenosis, bilateral    Carotid stenosis, right    Ileus    Frequent falls    Moderate protein-calorie malnutrition     Past Medical History:   Diagnosis Date    Acquired hypothyroidism     Acute sinusitis     SYLVAIN (acute kidney injury)     on CKD    Anemia     Arthritis     Atrial fibrillation     CAD (coronary artery disease)     Chronic combined systolic and diastolic congestive heart failure     COPD (chronic obstructive pulmonary disease)     Depression     Diabetic neuropathy 04/11/2022    Diabetes mellitus due to underlying condition    Esophagitis 06/10/2020    Added automatically from request for surgery [id number]      ESRD (end stage renal disease) 12/01/2023    Frequent PVCs     Generalized weakness     GERD (gastroesophageal reflux disease)     Helicobacter pylori gastritis 06/04/2020    Hyperlipidemia     Hypertension     Hypertensive encephalopathy     Pleural effusion     Pneumonia     Poor historian     RBBB (right bundle branch block)     Stroke     POOR VISION    TIA (transient ischemic attack)     Type 2 diabetes mellitus     Urinary retention     Vitamin D deficiency      Past Surgical History:   Procedure Laterality Date    APPENDECTOMY N/A 02/14/2016    Dr. Alexey Dodson    ARTERIOVENOUS FISTULA/SHUNT SURGERY Right 06/03/2022    Procedure: RIGHT FOREARM ARTERIOVENOUS FISTULA PLACEMENT RADIOCEPHALIC WITH CEPHALIC VEIN TRANSPOSITION;  Surgeon: Eliseo Willis MD;  Location: [facility] OR;  Service: Vascular;  Laterality: Right;    COLONOSCOPY      over 20 years ago.  no polyps     COLONOSCOPY N/A 09/18/2018    Procedure: COLONOSCOPY;  Surgeon: Jose Enrique Louie MD;  Location: [facility] ENDOSCOPY;  Service: Gastroenterology    COLONOSCOPY N/A 09/19/2018    IH, EH, polyps (TAs w/low grade dysplasia)    COLONOSCOPY N/A 06/01/2020    Procedure: COLONOSCOPY;   Surgeon: Jose Enrique Louie MD;  Location: [facility] ENDOSCOPY;  Service: Gastroenterology;  Laterality: N/A;  Pre op-Anemia, Melena, History of Polyps  Post op-To Cecum/TI, Polyp, Poor Prep    CORONARY ARTERY BYPASS GRAFT  11/2001    ENDOSCOPY N/A 05/29/2020    Procedure: ESOPHAGOGASTRODUODENOSCOPY with biopsies;  Surgeon: Amanda Lovelace MD;  Location: [facility] ENDOSCOPY;  Service: Gastroenterology;  Laterality: N/A;  pre-anemia, dark stools  post-esophagitis, hiatal hernia    ENDOSCOPY N/A 09/15/2020    Procedure: ESOPHAGOGASTRODUODENOSCOPY WITH BIOPSIES;  Surgeon: Jose Enrique Louie MD;  Location: [facility] ENDOSCOPY;  Service: Gastroenterology;  Laterality: N/A;  pre: history of melena and esophagitis  post: mild esophagitis and gastritis, small hiatal hernia    ENDOSCOPY N/A 12/4/2023    Procedure: ESOPHAGOGASTRODUODENOSCOPY with bx;  Surgeon: Quoc Elmore MD;  Location: [facility] ENDOSCOPY;  Service: Gastroenterology;  Laterality: N/A;  pre: Coffee-ground emesis  post: hiatal hernia, esophagitis    HERNIA REPAIR Left 12/05/2019    THORACENTESIS      TONSILLECTOMY      VASECTOMY        General Information       Row Name 04/17/25 1508          Physical Therapy Time and Intention    Document Type therapy note (daily note)  -     Mode of Treatment individual therapy;physical therapy  -       Row Name 04/17/25 1508          General Information    Patient Profile Reviewed yes  -SM     Existing Precautions/Restrictions fall  -       Row Name 04/17/25 1508          Cognition    Orientation Status (Cognition) oriented x 3  -       Row Name 04/17/25 1508          Safety Issues/Impairments Affecting Functional Mobility    Impairments Affecting Function (Mobility) balance;endurance/activity tolerance;pain;strength;range of motion (ROM);postural/trunk control  -               User Key  (r) = Recorded By, (t) = Taken By, (c) = Cosigned By      Initials Name Provider Type    SM Naomi Guerra PT Physical  Therapist                   Mobility       Row Name 04/17/25 1508          Bed Mobility    Bed Mobility supine-sit;sit-supine  -SM     Supine-Sit Alanson (Bed Mobility) minimum assist (75% patient effort);verbal cues  -SM     Sit-Supine Alanson (Bed Mobility) minimum assist (75% patient effort);verbal cues  -SM     Assistive Device (Bed Mobility) head of bed elevated;bed rails  -     Comment, (Bed Mobility) increased time  -SM       Row Name 04/17/25 1508          Sit-Stand Transfer    Sit-Stand Alanson (Transfers) minimum assist (75% patient effort);verbal cues  -SM     Assistive Device (Sit-Stand Transfers) other (see comments)  HHA  -       Row Name 04/17/25 1501          Gait/Stairs (Locomotion)    Alanson Level (Gait) minimum assist (75% patient effort)  -     Distance in Feet (Gait) 2  side steps to HOB  -     Deviations/Abnormal Patterns (Gait) eli decreased;stride length decreased;weight shifting decreased;base of support, wide;gait speed decreased  -     Bilateral Gait Deviations forward flexed posture;heel strike decreased  -     Comment, (Gait/Stairs) Patient unsteady with kyphotic posture and patient reaching outside of ADRIEL for furniture.  -               User Key  (r) = Recorded By, (t) = Taken By, (c) = Cosigned By      Initials Name Provider Type     Naomi Guerra PT Physical Therapist                   Obj/Interventions       Row Name 04/17/25 1508          Balance    Balance Assessment sitting static balance;sitting dynamic balance;standing static balance;standing dynamic balance  -     Static Sitting Balance contact guard  -     Dynamic Sitting Balance contact guard  -     Position, Sitting Balance sitting edge of bed  -     Static Standing Balance contact guard  -     Dynamic Standing Balance contact guard  -     Position/Device Used, Standing Balance supported;walker, front-wheeled  -     Balance Interventions sitting;standing;sit to  stand;supported;static;dynamic  -               User Key  (r) = Recorded By, (t) = Taken By, (c) = Cosigned By      Initials Name Provider Type    Naomi Adams PT Physical Therapist                   Goals/Plan    No documentation.                  Clinical Impression       Row Name 04/17/25 1510          Pain    Pretreatment Pain Rating 0/10 - no pain  -SM     Posttreatment Pain Rating 0/10 - no pain  -SM       Row Name 04/17/25 1510          Plan of Care Review    Plan of Care Reviewed With patient  -SM     Outcome Evaluation Patient seen for PT session this PM. Patient supine in bed upon arrival. Patient reports generalized fatigue but agreeable to attempt OOB activity. Patient required Shakeel and increased time to sit up to EOB. Patient with fwd flexed posture. Cues needed to look up. Patient stood from EOB with Shakeel-modA. Patient took a few side steps to the HOB with Shakeel and HHA. Patient unsteady with kyphotic posture and patient reaching outside of ADRIEL for furniture. ModA needed to safely return to sitting at EOB. Patient would continue to benefit from skilled PT intervention to address deficits in functional mobility. PT will continue to monitor.  -SM       Row Name 04/17/25 1510          Therapy Assessment/Plan (PT)    Therapy Frequency (PT) 5 times/wk  -       Row Name 04/17/25 1510          Vital Signs    Pre Patient Position Supine  -SM     Intra Patient Position Standing  -SM     Post Patient Position Supine  -SM       Row Name 04/17/25 1510          Positioning and Restraints    Pre-Treatment Position in bed  -SM     Post Treatment Position bed  -SM     In Bed notified nsg;call light within reach;encouraged to call for assist;exit alarm on;fowlers  -               User Key  (r) = Recorded By, (t) = Taken By, (c) = Cosigned By      Initials Name Provider Type    Naomi Adams PT Physical Therapist                   Outcome Measures       Row Name 04/17/25 1513          How much help  from another person do you currently need...    Turning from your back to your side while in flat bed without using bedrails? 4  -SM     Moving from lying on back to sitting on the side of a flat bed without bedrails? 3  -SM     Moving to and from a bed to a chair (including a wheelchair)? 3  -SM     Standing up from a chair using your arms (e.g., wheelchair, bedside chair)? 3  -SM     Climbing 3-5 steps with a railing? 2  -SM     To walk in hospital room? 2  -SM     AM-PAC 6 Clicks Score (PT) 17  -SM     Highest Level of Mobility Goal 5 --> Static standing  -       Row Name 04/17/25 1513          Functional Assessment    Outcome Measure Options AM-PAC 6 Clicks Basic Mobility (PT)  -               User Key  (r) = Recorded By, (t) = Taken By, (c) = Cosigned By      Initials Name Provider Type     Naomi Guerra PT Physical Therapist                                 Physical Therapy Education       Title: PT OT SLP Therapies (In Progress)       Topic: Physical Therapy (Done)       Point: Mobility training (Done)       Learning Progress Summary            Patient Acceptance, E, VU by  at 4/17/2025 1513    Acceptance, E, VU by  at 4/12/2025 1426                      Point: Home exercise program (Done)       Learning Progress Summary            Patient Acceptance, E, VU by  at 4/17/2025 1513    Acceptance, E, VU by  at 4/12/2025 1426                      Point: Body mechanics (Done)       Learning Progress Summary            Patient Acceptance, E, VU by  at 4/17/2025 1513    Acceptance, E, VU by  at 4/12/2025 1426                      Point: Precautions (Done)       Learning Progress Summary            Patient Acceptance, E, VU by  at 4/17/2025 1513    Acceptance, E, VU by  at 4/12/2025 1426                                      User Key       Initials Effective Dates Name Provider Type Discipline     05/02/22 -  Naomi Guerra, DENISSE Physical Therapist PT    CS 09/06/24 -  Prabhakar Marie, PT  Physical Therapist PT                  PT Recommendation and Plan     Outcome Evaluation: Patient seen for PT session this PM. Patient supine in bed upon arrival. Patient reports generalized fatigue but agreeable to attempt OOB activity. Patient required Shakeel and increased time to sit up to EOB. Patient with fwd flexed posture. Cues needed to look up. Patient stood from EOB with Shakeel-modA. Patient took a few side steps to the HOB with Shakeel and HHA. Patient unsteady with kyphotic posture and patient reaching outside of ADRIEL for furniture. ModA needed to safely return to sitting at EOB. Patient would continue to benefit from skilled PT intervention to address deficits in functional mobility. PT will continue to monitor.     Time Calculation:         PT Charges       Row Name 04/17/25 1514             Time Calculation    Start Time 1431  -SM      Stop Time 1443  -SM      Time Calculation (min) 12 min  -SM      PT Received On 04/17/25  -      PT - Next Appointment 04/18/25  -         Time Calculation- PT    Total Timed Code Minutes- PT 12 minute(s)  -SM         Timed Charges    81961 - PT Therapeutic Activity Minutes 12  -SM         Total Minutes    Timed Charges Total Minutes 12  -SM       Total Minutes 12  -SM                User Key  (r) = Recorded By, (t) = Taken By, (c) = Cosigned By      Initials Name Provider Type     Naomi Guerra PT Physical Therapist                  Therapy Charges for Today       Code Description Service Date Service Provider Modifiers Qty    [id number]  PT THERAPEUTIC ACT EA 15 MIN 4/17/2025 Naomi Guerra PT GP 1            PT G-Codes  Outcome Measure Options: AM-PAC 6 Clicks Basic Mobility (PT)  AM-PAC 6 Clicks Score (PT): 17  AM-PAC 6 Clicks Score (OT): 16  Modified Keren Scale: 4 - Moderately severe disability.  Unable to walk without assistance, and unable to attend to own bodily needs without assistance.       Naomi Guerra PT  4/17/2025

## 2025-04-18 LAB
ALBUMIN SERPL-MCNC: 2.8 G/DL (ref 3.5–5.2)
ANION GAP SERPL CALCULATED.3IONS-SCNC: 11.6 MMOL/L (ref 5–15)
BUN SERPL-MCNC: 56 MG/DL (ref 8–23)
BUN/CREAT SERPL: 13.2 (ref 7–25)
CALCIUM SPEC-SCNC: 8.5 MG/DL (ref 8.6–10.5)
CHLORIDE SERPL-SCNC: 104 MMOL/L (ref 98–107)
CO2 SERPL-SCNC: 23.4 MMOL/L (ref 22–29)
CREAT SERPL-MCNC: 4.25 MG/DL (ref 0.76–1.27)
DEPRECATED RDW RBC AUTO: 47.1 FL (ref 37–54)
EGFRCR SERPLBLD CKD-EPI 2021: 14.4 ML/MIN/1.73
ERYTHROCYTE [DISTWIDTH] IN BLOOD BY AUTOMATED COUNT: 14.6 % (ref 12.3–15.4)
GLUCOSE BLDC GLUCOMTR-MCNC: 121 MG/DL (ref 70–130)
GLUCOSE BLDC GLUCOMTR-MCNC: 172 MG/DL (ref 70–130)
GLUCOSE BLDC GLUCOMTR-MCNC: 179 MG/DL (ref 70–130)
GLUCOSE BLDC GLUCOMTR-MCNC: 194 MG/DL (ref 70–130)
GLUCOSE SERPL-MCNC: 80 MG/DL (ref 65–99)
HCT VFR BLD AUTO: 26.8 % (ref 37.5–51)
HGB BLD-MCNC: 8.2 G/DL (ref 13–17.7)
MCH RBC QN AUTO: 26.9 PG (ref 26.6–33)
MCHC RBC AUTO-ENTMCNC: 30.6 G/DL (ref 31.5–35.7)
MCV RBC AUTO: 87.9 FL (ref 79–97)
PHOSPHATE SERPL-MCNC: 3.5 MG/DL (ref 2.5–4.5)
PLATELET # BLD AUTO: 256 10*3/MM3 (ref 140–450)
PMV BLD AUTO: 10.1 FL (ref 6–12)
POTASSIUM SERPL-SCNC: 4.2 MMOL/L (ref 3.5–5.2)
RBC # BLD AUTO: 3.05 10*6/MM3 (ref 4.14–5.8)
SODIUM SERPL-SCNC: 139 MMOL/L (ref 136–145)
WBC NRBC COR # BLD AUTO: 6.57 10*3/MM3 (ref 3.4–10.8)

## 2025-04-18 PROCEDURE — G0378 HOSPITAL OBSERVATION PER HR: HCPCS

## 2025-04-18 PROCEDURE — 85027 COMPLETE CBC AUTOMATED: CPT | Performed by: INTERNAL MEDICINE

## 2025-04-18 PROCEDURE — G0257 UNSCHED DIALYSIS ESRD PT HOS: HCPCS

## 2025-04-18 PROCEDURE — 82948 REAGENT STRIP/BLOOD GLUCOSE: CPT

## 2025-04-18 PROCEDURE — 80069 RENAL FUNCTION PANEL: CPT | Performed by: INTERNAL MEDICINE

## 2025-04-18 PROCEDURE — 63710000001 INSULIN LISPRO (HUMAN) PER 5 UNITS: Performed by: NURSE PRACTITIONER

## 2025-04-18 RX ORDER — AMOXICILLIN 250 MG
2 CAPSULE ORAL 2 TIMES DAILY
Start: 2025-04-18

## 2025-04-18 RX ORDER — FLUTICASONE PROPIONATE 50 MCG
2 SPRAY, SUSPENSION (ML) NASAL DAILY
Start: 2025-04-19

## 2025-04-18 RX ORDER — ACETAMINOPHEN 325 MG/1
650 TABLET ORAL EVERY 4 HOURS PRN
Start: 2025-04-18

## 2025-04-18 RX ADMIN — INSULIN LISPRO 2 UNITS: 100 INJECTION, SOLUTION INTRAVENOUS; SUBCUTANEOUS at 17:50

## 2025-04-18 RX ADMIN — Medication 10 ML: at 09:38

## 2025-04-18 RX ADMIN — Medication 5000 UNITS: at 09:38

## 2025-04-18 RX ADMIN — MIDODRINE HYDROCHLORIDE 2.5 MG: 2.5 TABLET ORAL at 12:32

## 2025-04-18 RX ADMIN — ASPIRIN 81 MG: 81 TABLET, COATED ORAL at 09:38

## 2025-04-18 RX ADMIN — PANTOPRAZOLE SODIUM 40 MG: 40 TABLET, DELAYED RELEASE ORAL at 17:55

## 2025-04-18 RX ADMIN — MIDODRINE HYDROCHLORIDE 2.5 MG: 2.5 TABLET ORAL at 17:55

## 2025-04-18 RX ADMIN — SENNOSIDES AND DOCUSATE SODIUM 2 TABLET: 50; 8.6 TABLET ORAL at 09:38

## 2025-04-18 RX ADMIN — LEVOTHYROXINE SODIUM 75 MCG: 0.07 TABLET ORAL at 06:48

## 2025-04-18 RX ADMIN — INSULIN LISPRO 2 UNITS: 100 INJECTION, SOLUTION INTRAVENOUS; SUBCUTANEOUS at 12:32

## 2025-04-18 RX ADMIN — PANTOPRAZOLE SODIUM 40 MG: 40 TABLET, DELAYED RELEASE ORAL at 06:48

## 2025-04-18 RX ADMIN — FLUTICASONE PROPIONATE 2 SPRAY: 50 SPRAY, METERED NASAL at 09:38

## 2025-04-18 RX ADMIN — CETIRIZINE HYDROCHLORIDE 10 MG: 10 TABLET, FILM COATED ORAL at 09:38

## 2025-04-18 RX ADMIN — APIXABAN 2.5 MG: 2.5 TABLET, FILM COATED ORAL at 22:10

## 2025-04-18 RX ADMIN — INSULIN LISPRO 2 UNITS: 100 INJECTION, SOLUTION INTRAVENOUS; SUBCUTANEOUS at 22:13

## 2025-04-18 RX ADMIN — MIDODRINE HYDROCHLORIDE 2.5 MG: 2.5 TABLET ORAL at 06:48

## 2025-04-18 RX ADMIN — ATORVASTATIN CALCIUM 80 MG: 80 TABLET, FILM COATED ORAL at 22:10

## 2025-04-18 RX ADMIN — AMIODARONE HYDROCHLORIDE 200 MG: 200 TABLET ORAL at 09:38

## 2025-04-18 RX ADMIN — APIXABAN 2.5 MG: 2.5 TABLET, FILM COATED ORAL at 09:38

## 2025-04-18 RX ADMIN — Medication 10 ML: at 22:10

## 2025-04-18 NOTE — CASE MANAGEMENT/SOCIAL WORK
Post-Acute Authorization Submission      Post Acute Pre-Cert Documentation  Request Submitted by Facility - Type:: Hospital  Post-Acute Authorization Type Submitted:: SNF  Date Post Acute Pre-Cert Inititated per Facility: 04/18/25  Accepting Facility: [facility] Discharge Date Requested: 04/18/25  All Clinicals Submitted?: Yes  Had Accepting Facility at Time of Submission: Yes  Response Communicated to:: , Accepting Facility Liaison  Authorization Number:: [id number]              JAIME Archer

## 2025-04-18 NOTE — CASE MANAGEMENT/SOCIAL WORK
Post-Acute Authorization Submission      Post Acute Pre-Cert Documentation  Request Submitted by Facility - Type:: Hospital  Post-Acute Authorization Type Submitted:: SNF  Date Post Acute Pre-Cert Inititated per Facility: 04/18/25  Date Post Acute Pre-Cert Completed: 04/18/25  Accepting Facility: [facility] Discharge Date Requested: 04/18/25  All Clinicals Submitted?: Yes  Had Accepting Facility at Time of Submission: Yes  Response Received from Insurance?: Approval  Response Communicated to:: , Accepting Facility Liaison, Accepting Facility Auth Department  Authorization Number:: APPROVED [id number]  Post Acute Pre-Cert Initiated Comment: VALID TO ADMIT UPTO 4/23/25              Keny Milner PCT

## 2025-04-18 NOTE — PROGRESS NOTES
[facility] Progress Note      Patient Name: Carlito Mo  : 1955  MRN: [id number]  Primary Care Physician:  B[place] Simons APRN  Date of admission: 2025    Subjective     Interval History:   F/u ESRD     Review of Systems:   Denies dyspnea or nausea    Objective     Vitals:   Temp:  [97.7 °F (36.5 °C)-98.6 °F (37 °C)] 97.9 °F (36.6 °C)  Heart Rate:  [58-85] 85  Resp:  [14-18] 16  BP: (108-135)/(46-74) 108/55    Intake/Output Summary (Last 24 hours) at 2025 0724  Last data filed at 2025 0300  Gross per 24 hour   Intake 540 ml   Output 650 ml   Net -110 ml       Physical Exam:    General Appearance: frail cachectic WM no distress  Neck: supple, no JVD  Lungs: CTA bilat no rales  Heart: RRR, normal S1 and S2  Abdomen: soft, nontender, nondistended  Extremities: no edema, cyanosis or clubbing      Scheduled Meds:     amiodarone, 200 mg, Oral, Q24H  apixaban, 2.5 mg, Oral, BID  aspirin, 81 mg, Oral, Daily  atorvastatin, 80 mg, Oral, Nightly  cetirizine, 10 mg, Oral, Daily  cholecalciferol, 5,000 Units, Oral, Daily  fluticasone, 2 spray, Each Nare, Daily  insulin lispro, 2-9 Units, Subcutaneous, 4x Daily AC & at Bedtime  levothyroxine, 75 mcg, Oral, Q AM  midodrine, 2.5 mg, Oral, TID AC  pantoprazole, 40 mg, Oral, BID AC  senna-docusate sodium, 2 tablet, Oral, BID  sodium chloride, 10 mL, Intravenous, Q12H  [Held by provider] tamsulosin, 0.4 mg, Oral, Daily      IV Meds:        Results Reviewed:   I have personally reviewed the results from the time of this admission to 2025 07:24 EDT     Results from last 7 days   Lab Units 25  0454 25  0630 04/15/25  0356 25  0448 25  0101   SODIUM mmol/L 139 135* 136   < > 138   POTASSIUM mmol/L 4.2 4.1 3.9   < > 3.4*   CHLORIDE mmol/L 104 102 103   < > 103   CO2 mmol/L 23.4 23.2 22.5   < > 24.4   BUN mg/dL 56* 55* 32*   < > 24*   CREATININE mg/dL 4.25* 4.54* 3.35*   < > 2.86*   CALCIUM mg/dL 8.5* 8.9  8.6   < > 8.7   BILIRUBIN mg/dL  --   --   --   --  0.3   ALK PHOS U/L  --   --   --   --  143*   ALT (SGPT) U/L  --   --   --   --  22   AST (SGOT) U/L  --   --   --   --  21   GLUCOSE mg/dL 80 132* 74   < > 227*    < > = values in this interval not displayed.     Estimated Creatinine Clearance: 14.7 mL/min (A) (by C-G formula based on SCr of 4.25 mg/dL (H)).  Results from last 7 days   Lab Units 04/18/25  0454 04/16/25  0630 04/15/25  0356 04/13/25  0448 04/12/25  0101   MAGNESIUM mg/dL  --   --   --   --  1.7   PHOSPHORUS mg/dL 3.5 3.3 2.7   < > 1.7*    < > = values in this interval not displayed.     Results from last 7 days   Lab Units 04/12/25  1226   URIC ACID mg/dL 2.8*     Results from last 7 days   Lab Units 04/18/25  0454 04/15/25  0356 04/14/25  0356 04/13/25  0448 04/12/25  0101   WBC 10*3/mm3 6.57 5.95 6.47 9.05 4.64   HEMOGLOBIN g/dL 8.2* 7.9* 7.7* 8.1* 8.4*   PLATELETS 10*3/mm3 256 259 244 255 258           Assessment / Plan     ASSESSMENT:  ESRD.  HD MWF.  Treatment today.  Vol/lytes stable  Hypotension on midodrine.  Blood pressure stable.  Reduce midodrine 5 to 2.5 mg TID  Falls, generalized weakness.  Diabetes mellitus type 2 with nephropathy, neuropathy.  Mgmt per primary team  Anemia of CKD.  Hemoglobin up slightly 8.2.  Received IV iron with dialysis 4/14/25.     PLAN:  HD today  Rehab placement in progress ([facility] - dialysis on site)  Can recheck labs MON from my perspective     Jose Enrique Montelongo MD  04/18/25  07:24 EDT    [facility]  [phone]

## 2025-04-18 NOTE — PLAN OF CARE
Goal Outcome Evaluation:  Plan of Care Reviewed With: patient           Outcome Evaluation: No acute changes, VSS. AOX4. Dialysis completed today. Discharge ordered but waiting on precert, TM

## 2025-04-18 NOTE — PLAN OF CARE
Goal Outcome Evaluation:      Pt A/Ox4, RA, VSS. No acute changes overnight. Pt remains very weak and at times sleepy, alert upon assessment. Small smear BM 4/17/25. Potential for discharge after dialysis today- pt will need transportation. Urinal within reach. Bed alarm on. Call light within reach. Plan of care ongoing.

## 2025-04-18 NOTE — DISCHARGE SUMMARY
NAME: Carlito Mo ADMIT: 2025   : 1955  PCP: Belle Simons APRN    MRN: [id number] LOS: 0 days   AGE/SEX: 69 y.o. male  ROOM: [facility]     Date of Admission:  2025  Date of Discharge:  2025    PCP: Belle Simons APRN    CHIEF COMPLAINT  Fall      DISCHARGE DIAGNOSIS  Active Hospital Problems    Diagnosis  POA    **Frequent falls [R29.6]  Not Applicable    Moderate protein-calorie malnutrition [E44.0]  Yes    Chronic HFrEF (heart failure with reduced ejection fraction) [I50.22]  Yes    ESRD (end stage renal disease) [N18.6]  Yes    History of stroke [Z86.73]  Not Applicable    Paroxysmal atrial fibrillation [I48.0]  Yes    Coronary artery disease involving native coronary artery of native heart without angina pectoris [I25.10]  Yes    Benign prostatic hyperplasia with nocturia [N40.1, R35.1]  Yes    Type 2 diabetes mellitus, with long-term current use of insulin [E11.9, Z79.4]  Not Applicable      Resolved Hospital Problems   No resolved problems to display.       SECONDARY DIAGNOSES  Past Medical History:   Diagnosis Date    Acquired hypothyroidism     Acute sinusitis     SYLVAIN (acute kidney injury)     on CKD    Anemia     Arthritis     Atrial fibrillation     CAD (coronary artery disease)     Chronic combined systolic and diastolic congestive heart failure     COPD (chronic obstructive pulmonary disease)     Depression     Diabetic neuropathy 2022    Diabetes mellitus due to underlying condition    Esophagitis 06/10/2020    Added automatically from request for surgery [id number]      ESRD (end stage renal disease) 2023    Frequent PVCs     Generalized weakness     GERD (gastroesophageal reflux disease)     Helicobacter pylori gastritis 2020    Hyperlipidemia     Hypertension     Hypertensive encephalopathy     Pleural effusion     Pneumonia     Poor historian     RBBB (right bundle branch block)     Stroke     POOR VISION    TIA (transient ischemic attack)     Type 2  diabetes mellitus     Urinary retention     Vitamin D deficiency        CONSULTS   Nephrology  Palliative Care    HOSPITAL COURSE  Patient is a 69 y.o. male ESRD on dialysis, CAD, Afib and h/o stroke on eliquis. He was seen last month here for aflutter, hypotension on midodrine, PNA and was discharged from here to rehab.     Discharged from rehab, had a fall at home. Imaging negative in the ER. He was given HD while here. Agents for constipation and congestion. He will go to rehab facility to get stronger. There was some talk about palliative care and stopping dialysis but he wishes to continue this for the time being and try to get stronger but he could be nearing the end of his life so could consider stopping HD/Hospice care in the future.     DIAGNOSTICS      Collected Updated Procedure Result Status    04/18/2025 0454 04/18/2025 0656 CBC (No Diff) [[id number]]   (Abnormal)   Blood    Final result Component Value Units   WBC 6.57 10*3/mm3   RBC 3.05 Low  10*6/mm3   Hemoglobin 8.2 Low  g/dL   Hematocrit 26.8 Low  %   MCV 87.9 fL   MCH 26.9 pg   MCHC 30.6 Low  g/dL   RDW 14.6 %   RDW-SD 47.1 fl   MPV 10.1 fL   Platelets 256 10*3/mm3          04/18/2025 0454 04/18/2025 0714 Renal Function Panel [[id number]]    (Abnormal)   Blood    Final result Component Value Units   Glucose 80 mg/dL   BUN 56 High  mg/dL   Creatinine 4.25 High  mg/dL   Sodium 139 mmol/L   Potassium 4.2 mmol/L   Chloride 104 mmol/L   CO2 23.4 mmol/L   Calcium 8.5 Low  mg/dL   Albumin 2.8 Low  g/dL   Phosphorus 3.5 mg/dL   Anion Gap 11.6 mmol/L   BUN/Creatinine Ratio 13.2    eGFR 14.4 Low  mL/min/1.73                CT Head Without Contrast [[id number]] Damian as Reviewed   Order Status: Completed Collected: 04/12/25 0140    Updated: 04/12/25 0158   Narrative:     CT OF THE HEAD WITHOUT CONTRAST     HISTORY: Fall     COMPARISON: March 16, 2025     TECHNIQUE: Axial CT imaging was obtained through the brain. No IV  contrast was administered.      FINDINGS:  No acute intracranial hemorrhage is seen. There is diffuse atrophy.  There is periventricular and deep white matter microangiopathic change.  The atrophy is relatively advanced for the age of 69. There is an old  infarct noted within the right cerebellar hemisphere. No calvarial  fracture is seen. Large mucous retention cyst is noted within the right  maxillary sinus. There is some mucosal thickening within the ethmoid  sinuses. There is a left parietal scalp lesion, which is likely a  sebaceous cyst. Additional lesion is noted within the occipital region.      Impression:     No acute intracranial findings.     Radiation dose reduction techniques were utilized, including automated  exposure control and exposure modulation based on body size.        This report was finalized on 4/12/2025 1:55 AM by Dr. Alice Allen M.D on Workstation: [id number]       CT Cervical Spine Without Contrast [[id number]] Damian as Reviewed   Order Status: Completed Collected: 04/12/25 0155    Updated: 04/12/25 0200   Narrative:     CT OF THE CERVICAL SPINE     HISTORY: Fall     COMPARISON: None available.     TECHNIQUE: Axial CT imaging was obtained through the cervical spine.  Coronal and sagittal reformatted images were obtained.     FINDINGS:  No acute fracture or subluxation of the cervical spine is seen. There is  mild retrolisthesis of C4 on C5. Intervertebral disc space narrowing is  most significant at C6-C7. There is no prevertebral soft tissue  swelling. Images through the lung apices are clear. There are carotid  calcifications. Canal stenosis is probably most significant at C4-C5 and  C5-C6. Neural foraminal narrowing is noted at multiple levels, but is  probably most significant at C4-C5 and C5-C6 on the right.      Impression:     No acute fracture or subluxation identified.     Radiation dose reduction techniques were utilized, including automated  exposure control and exposure modulation based on body  "size.          PHYSICAL EXAM  Objective:  Vital signs: (most recent): Blood pressure 108/55, pulse 85, temperature 97.9 °F (36.6 °C), temperature source Oral, resp. rate 16, height 182.9 cm (72\"), weight 63.5 kg (140 lb), SpO2 98%.                Alert  nad  No resp distress  Elderly, chronically ill    CONDITION ON DISCHARGE  Stable.      DISCHARGE DISPOSITION   Skilled Nursing Facility (DC - External)      DISCHARGE MEDICATIONS       Your medication list        START taking these medications        Instructions Last Dose Given Next Dose Due   acetaminophen 325 MG tablet  Commonly known as: TYLENOL      Take 2 tablets by mouth Every 4 (Four) Hours As Needed for Mild Pain.       fluticasone 50 MCG/ACT nasal spray  Commonly known as: FLONASE  Start taking on: April 19, 2025      2 sprays by Each Nare route Daily.       sennosides-docusate 8.6-50 MG per tablet  Commonly known as: PERICOLACE      Take 2 tablets by mouth 2 (Two) Times a Day.              CONTINUE taking these medications        Instructions Last Dose Given Next Dose Due   amiodarone 200 MG tablet  Commonly known as: PACERONE      Take 1 tablet by mouth Daily for 30 days.       aspirin 81 MG EC tablet      Take 1 tablet by mouth Daily.       atorvastatin 80 MG tablet  Commonly known as: LIPITOR      Take 1 tablet by mouth Every Night.       Eliquis 2.5 MG tablet tablet  Generic drug: apixaban      TAKE ONE TABLET BY MOUTH TWICE DAILY       insulin lispro 100 UNIT/ML injection  Commonly known as: HUMALOG/ADMELOG      Inject 2-7 Units under the skin into the appropriate area as directed 4 (Four) Times a Day Before Meals & at Bedtime.       levothyroxine 75 MCG tablet  Commonly known as: SYNTHROID, LEVOTHROID      Take 1 tablet by mouth Every Morning.       midodrine 5 MG tablet  Commonly known as: PROAMATINE      Take 1 tablet by mouth 3 (Three) Times a Day Before Meals.       pantoprazole 40 MG EC tablet  Commonly known as: PROTONIX      Take 1 tablet by " mouth 2 (Two) Times a Day Before Meals.       vitamin D3 125 MCG (5000 UT) capsule capsule      Take 1 capsule by mouth Daily.              STOP taking these medications      tamsulosin 0.4 MG capsule 24 hr capsule  Commonly known as: FLOMAX                  Where to Get Your Medications        Information about where to get these medications is not yet available    Ask your nurse or doctor about these medications  acetaminophen 325 MG tablet  fluticasone 50 MCG/ACT nasal spray  sennosides-docusate 8.6-50 MG per tablet          Future Appointments   Date Time Provider Department Center   10/1/2025  2:00 PM Ania Amaya MD [facility]      Follow-up Information       Belle Simons, APRN .    Specialty: Family Medicine  Contact information:  [address] [id number] [phone]                             TEST  RESULTS PENDING AT DISCHARGE         Asim Hogue MD  [facility]  04/18/25  13:04 EDT      Time: greater than 32 minutes on discharge  It was a pleasure taking care of this patient while in the hospital.

## 2025-04-18 NOTE — CASE MANAGEMENT/SOCIAL WORK
Continued Stay Note  [facility]     Patient Name: Carlito Mo  MRN: [id number]  Today's Date: 4/18/2025    Admit Date: 4/12/2025    Plan: [facility] pre-cert pending.   Discharge Plan       Row Name 04/18/25 1603       Plan    Plan [facility] pre-cert pending.    Patient/Family in Agreement with Plan yes    Plan Comments Spoke with patient's spouse inbound call. Reviewed current discharge plan to [facility]; pre-cert is still pending.                   Discharge Codes    No documentation.                 Expected Discharge Date and Time       Expected Discharge Date Expected Discharge Time    Apr 18, 2025               Marely Castro RN

## 2025-04-18 NOTE — CONSULTS
Please see previous palliative care note from 4/14. I received a message from RN that patient's wife had more questions for me. I did call her this morning and left a message, have not heard back yet. Will remain available as needed although I see plans for discharge to rehab today.

## 2025-04-19 VITALS
SYSTOLIC BLOOD PRESSURE: 132 MMHG | HEIGHT: 72 IN | BODY MASS INDEX: 18.96 KG/M2 | DIASTOLIC BLOOD PRESSURE: 66 MMHG | OXYGEN SATURATION: 96 % | RESPIRATION RATE: 16 BRPM | TEMPERATURE: 97.9 F | WEIGHT: 140 LBS | HEART RATE: 54 BPM

## 2025-04-19 LAB — GLUCOSE BLDC GLUCOMTR-MCNC: 139 MG/DL (ref 70–130)

## 2025-04-19 PROCEDURE — 82948 REAGENT STRIP/BLOOD GLUCOSE: CPT

## 2025-04-19 PROCEDURE — G0378 HOSPITAL OBSERVATION PER HR: HCPCS

## 2025-04-19 RX ADMIN — FLUTICASONE PROPIONATE 2 SPRAY: 50 SPRAY, METERED NASAL at 08:11

## 2025-04-19 RX ADMIN — MIDODRINE HYDROCHLORIDE 2.5 MG: 2.5 TABLET ORAL at 06:44

## 2025-04-19 RX ADMIN — Medication 5000 UNITS: at 08:09

## 2025-04-19 RX ADMIN — ASPIRIN 81 MG: 81 TABLET, COATED ORAL at 08:10

## 2025-04-19 RX ADMIN — AMIODARONE HYDROCHLORIDE 200 MG: 200 TABLET ORAL at 08:10

## 2025-04-19 RX ADMIN — PANTOPRAZOLE SODIUM 40 MG: 40 TABLET, DELAYED RELEASE ORAL at 06:43

## 2025-04-19 RX ADMIN — CETIRIZINE HYDROCHLORIDE 10 MG: 10 TABLET, FILM COATED ORAL at 08:10

## 2025-04-19 RX ADMIN — LEVOTHYROXINE SODIUM 75 MCG: 0.07 TABLET ORAL at 06:44

## 2025-04-19 RX ADMIN — APIXABAN 2.5 MG: 2.5 TABLET, FILM COATED ORAL at 08:10

## 2025-04-19 NOTE — PLAN OF CARE
Goal Outcome Evaluation:      Pt A/Ox4, RA, VSS. No acute changes overnight. LBM 4/19/25. Potential for discharge today- pt will need transportation. Urinal within reach. Bed alarm on. Call light within reach. Plan of care ongoing.

## 2025-04-19 NOTE — CASE MANAGEMENT/SOCIAL WORK
Case Management Discharge Note      Final Note: Incoming call from RN confirming discharge plans and patient needs transport. CCP confirmed patient plans [facility] and pre cert approved. CCP requested EMS transport with pickup time frame of 9:$5-10:45. RN updated. RN has packet with EMS form. Pedro BENZ CCP         Selected Continued Care - Admitted Since 4/12/2025       Destination Coordination complete.      Service Provider Services Address Phone Fax Patient Preferred    [facility] [address] [facility] [address] [phone] --              Durable Medical Equipment    No services have been selected for the patient.                Dialysis/Infusion    No services have been selected for the patient.                Home Medical Care    No services have been selected for the patient.                Therapy    No services have been selected for the patient.                Community Resources    No services have been selected for the patient.                Community & DME    No services have been selected for the patient.                    Selected Continued Care - Prior Encounters Includes continued care and service providers with selected services from prior encounters from 1/12/2025 to 4/19/2025      Discharged on 3/20/2025 Admission date: 3/10/2025 - Discharge disposition: Skilled Nursing Facility (DC - External)      Destination       Service Provider Services Address Phone Fax Patient Preferred    [facility] [address] [facility] [address] [phone] --                          Transportation Services  Ambulance: [facility]    Final Discharge Disposition Code: 03 - skilled nursing facility (SNF)

## 2025-04-19 NOTE — PROGRESS NOTES
Name: Carlito Mo ADMIT: 2025   : 1955  PCP: Belle Simons APRN    MRN: [id number] LOS: 0 days   AGE/SEX: 69 y.o. male  ROOM: [facility]   Subjective   Chief Complaint   Patient presents with    Fall     Patient is a 69 y.o. male ESRD on dialysis, CAD, Afib and h/o stroke on eliquis. He was seen last month here for aflutter, hypotension on midodrine, PNA and was discharged from here to rehab.     Discharged from rehab, had a fall at home. Imaging negative in the ER.      Some fatigue  Working with therapy  sinus congestion better on meds      ROS  No f/c  No n/v  No cp/palp  No soa/cough    Objective   Vital Signs  Temp:  [97.5 °F (36.4 °C)-98.4 °F (36.9 °C)] 97.9 °F (36.6 °C)  Heart Rate:  [54-67] 54  Resp:  [16] 16  BP: (119-149)/(60-80) 132/66  SpO2:  [96 %-98 %] 96 %  on   ;   Device (Oxygen Therapy): room air  Body mass index is 18.99 kg/m².    Physical Exam  HENT:      Head: Normocephalic and atraumatic.   Eyes:      General: No scleral icterus.  Pulmonary:      Effort: Pulmonary effort is normal. No respiratory distress.   Abdominal:      General: There is no distension.      Palpations: Abdomen is soft.      Tenderness: There is no abdominal tenderness.   Musculoskeletal:      Cervical back: Neck supple.   Neurological:      Mental Status: He is alert.   Psychiatric:         Behavior: Behavior normal.     Chronically ill  Muscle atrophy  Similar exam to last few days    Results Review:       I reviewed the patient's new clinical results.  Results from last 7 days   Lab Units 25  0454 04/15/25  0356 25  0356 25  0448   WBC 10*3/mm3 6.57 5.95 6.47 9.05   HEMOGLOBIN g/dL 8.2* 7.9* 7.7* 8.1*   PLATELETS 10*3/mm3 256 259 244 255     Results from last 7 days   Lab Units 25  0454 25  0630 04/15/25  0356 25  0356   SODIUM mmol/L 139 135* 136 130*   POTASSIUM mmol/L 4.2 4.1 3.9 3.9   CHLORIDE mmol/L 104 102 103 100   CO2 mmol/L 23.4 23.2 22.5 21.8*   BUN mg/dL 56*  "55* 32* 63*   CREATININE mg/dL 4.25* 4.54* 3.35* 5.31*   GLUCOSE mg/dL 80 132* 74 97   Estimated Creatinine Clearance: 14.7 mL/min (A) (by C-G formula based on SCr of 4.25 mg/dL (H)).  Results from last 7 days   Lab Units 04/18/25  0454 04/16/25  0630 04/15/25  0356 04/14/25  0356   ALBUMIN g/dL 2.8* 3.1* 3.0* 3.1*     Results from last 7 days   Lab Units 04/18/25  0454 04/16/25  0630 04/15/25  0356 04/14/25  0356   CALCIUM mg/dL 8.5* 8.9 8.6 8.6   ALBUMIN g/dL 2.8* 3.1* 3.0* 3.1*   PHOSPHORUS mg/dL 3.5 3.3 2.7 3.5         Coag     HbA1C   Lab Results   Component Value Date    HGBA1C 7.10 (H) 03/10/2025    HGBA1C 8 (H) 02/19/2025    HGBA1C 8.2 (H) 01/08/2025     Infection     Radiology(recent) No radiology results for the last day    No results found for: \"TROPONINT\", \"TROPONINI\", \"BNP\"  No components found for: \"TSH;2\"    amiodarone, 200 mg, Oral, Q24H  apixaban, 2.5 mg, Oral, BID  aspirin, 81 mg, Oral, Daily  atorvastatin, 80 mg, Oral, Nightly  cetirizine, 10 mg, Oral, Daily  cholecalciferol, 5,000 Units, Oral, Daily  fluticasone, 2 spray, Each Nare, Daily  insulin lispro, 2-9 Units, Subcutaneous, 4x Daily AC & at Bedtime  levothyroxine, 75 mcg, Oral, Q AM  midodrine, 2.5 mg, Oral, TID AC  pantoprazole, 40 mg, Oral, BID AC  senna-docusate sodium, 2 tablet, Oral, BID  sodium chloride, 10 mL, Intravenous, Q12H  [Held by provider] tamsulosin, 0.4 mg, Oral, Daily       Diet: Diabetic; Consistent Carbohydrate; Fluid Consistency: Thin (IDDSI 0)      Assessment & Plan      Active Hospital Problems    Diagnosis  POA    **Frequent falls [R29.6]  Not Applicable    Moderate protein-calorie malnutrition [E44.0]  Yes    Chronic HFrEF (heart failure with reduced ejection fraction) [I50.22]  Yes    ESRD (end stage renal disease) [N18.6]  Yes    History of stroke [Z86.73]  Not Applicable    Paroxysmal atrial fibrillation [I48.0]  Yes    Coronary artery disease involving native coronary artery of native heart without angina " pectoris [I25.10]  Yes    Benign prostatic hyperplasia with nocturia [N40.1, R35.1]  Yes    Type 2 diabetes mellitus, with long-term current use of insulin [E11.9, Z79.4]  Not Applicable      Resolved Hospital Problems   No resolved problems to display.       Patient is a 69 y.o. male ESRD on dialysis, CAD, Afib and h/o stroke on eliquis. He was seen last month here for aflutter, hypotension on midodrine, PNA and was discharged from here to rehab.     Discharged from rehab, had a fall at home. Imaging negative in the ER.     PT/OT/CCP consult  Sliding scale insulin  Resumed home asa and eliquis  on midodrine- monitor BP  Nephrology following for HD  chronic anemia  Agents for constipation  agents for sinus congestion  I discussed the patients findings and my recommendations with patient.     VTE Prophylaxis - eliquis    Rehab facility and precert pending   DW staff    Asim Hogue MD  [facility]  04/18/25  11:53 EDT

## 2025-04-19 NOTE — PLAN OF CARE
Patient to DC to [facility] via ambulance.  Problem: Adult Inpatient Plan of Care  Goal: Plan of Care Review  Outcome: Adequate for Care Transition  Goal: Patient-Specific Goal (Individualized)  Outcome: Adequate for Care Transition  Goal: Absence of Hospital-Acquired Illness or Injury  Outcome: Adequate for Care Transition  Intervention: Identify and Manage Fall Risk  Recent Flowsheet Documentation  Taken 4/19/2025 0809 by Louise See RN  Safety Promotion/Fall Prevention:   clutter free environment maintained   safety round/check completed  Intervention: Prevent Skin Injury  Recent Flowsheet Documentation  Taken 4/19/2025 0809 by Louise See RN  Body Position: position changed independently  Skin Protection: incontinence pads utilized  Intervention: Prevent and Manage VTE (Venous Thromboembolism) Risk  Recent Flowsheet Documentation  Taken 4/19/2025 0809 by Louise See RN  VTE Prevention/Management:   SCDs (sequential compression devices) off   patient refused intervention  Intervention: Prevent Infection  Recent Flowsheet Documentation  Taken 4/19/2025 0809 by Louise See RN  Infection Prevention:   environmental surveillance performed   single patient room provided  Goal: Optimal Comfort and Wellbeing  Outcome: Adequate for Care Transition  Intervention: Provide Person-Centered Care  Recent Flowsheet Documentation  Taken 4/19/2025 0809 by Louise See RN  Trust Relationship/Rapport:   care explained   thoughts/feelings acknowledged  Goal: Readiness for Transition of Care  Outcome: Adequate for Care Transition     Problem: Fall Injury Risk  Goal: Absence of Fall and Fall-Related Injury  Outcome: Adequate for Care Transition  Intervention: Promote Injury-Free Environment  Recent Flowsheet Documentation  Taken 4/19/2025 0809 by Louise See RN  Safety Promotion/Fall Prevention:   clutter free environment maintained   safety round/check completed     Problem: Skin  Injury Risk Increased  Goal: Skin Health and Integrity  Outcome: Adequate for Care Transition  Intervention: Optimize Skin Protection  Recent Flowsheet Documentation  Taken 4/19/2025 0809 by Louise See RN  Pressure Reduction Techniques: frequent weight shift encouraged  Head of Bed (HOB) Positioning: HOB at 30 degrees  Skin Protection: incontinence pads utilized     Problem: Comorbidity Management  Goal: Maintenance of COPD Symptom Control  Outcome: Adequate for Care Transition  Goal: Blood Glucose Level Within Target Range  Outcome: Adequate for Care Transition  Goal: Maintenance of Heart Failure Symptom Control  Outcome: Adequate for Care Transition  Goal: Blood Pressure in Desired Range  Outcome: Adequate for Care Transition     Problem: Malnutrition  Goal: Improved Nutritional Intake  Outcome: Adequate for Care Transition   Goal Outcome Evaluation:

## 2025-04-19 NOTE — NURSING NOTE
Attempted to call patient's wife per patient request to update on patient going to [facility] . Was unable to get a hold of spouse.

## 2025-04-19 NOTE — PROGRESS NOTES
[facility] Progress Note      Patient Name: Carlito Mo  : 1955  MRN: [id number]  Primary Care Physician:  Belle Simons APRN  Date of admission: 2025    Subjective     Interval History:   F/u ESRD     Review of Systems:   Denies dyspnea or nausea    Objective     Vitals:   Temp:  [97.5 °F (36.4 °C)-98.4 °F (36.9 °C)] 97.9 °F (36.6 °C)  Heart Rate:  [54-67] 54  Resp:  [16] 16  BP: (119-149)/(60-80) 132/66    Intake/Output Summary (Last 24 hours) at 2025 0834  Last data filed at 2025 2200  Gross per 24 hour   Intake 340 ml   Output 1400 ml   Net -1060 ml       Physical Exam:    General Appearance: frail cachectic WM no distress  Neck: supple, no JVD  Lungs: CTA bilat no rales  Heart: RRR, normal S1 and S2  Abdomen: soft, nontender, nondistended  Extremities: no edema, cyanosis or clubbing      Scheduled Meds:     amiodarone, 200 mg, Oral, Q24H  apixaban, 2.5 mg, Oral, BID  aspirin, 81 mg, Oral, Daily  atorvastatin, 80 mg, Oral, Nightly  cetirizine, 10 mg, Oral, Daily  cholecalciferol, 5,000 Units, Oral, Daily  fluticasone, 2 spray, Each Nare, Daily  insulin lispro, 2-9 Units, Subcutaneous, 4x Daily AC & at Bedtime  levothyroxine, 75 mcg, Oral, Q AM  midodrine, 2.5 mg, Oral, TID AC  pantoprazole, 40 mg, Oral, BID AC  senna-docusate sodium, 2 tablet, Oral, BID  sodium chloride, 10 mL, Intravenous, Q12H  [Held by provider] tamsulosin, 0.4 mg, Oral, Daily      IV Meds:        Results Reviewed:   I have personally reviewed the results from the time of this admission to 2025 08:34 EDT     Results from last 7 days   Lab Units 25  0454 25  0630 04/15/25  0356   SODIUM mmol/L 139 135* 136   POTASSIUM mmol/L 4.2 4.1 3.9   CHLORIDE mmol/L 104 102 103   CO2 mmol/L 23.4 23.2 22.5   BUN mg/dL 56* 55* 32*   CREATININE mg/dL 4.25* 4.54* 3.35*   CALCIUM mg/dL 8.5* 8.9 8.6   GLUCOSE mg/dL 80 132* 74     Estimated Creatinine Clearance: 14.7 mL/min (A) (by C-G  formula based on SCr of 4.25 mg/dL (H)).  Results from last 7 days   Lab Units 04/18/25  0454 04/16/25  0630 04/15/25  0356   PHOSPHORUS mg/dL 3.5 3.3 2.7     Results from last 7 days   Lab Units 04/12/25  1226   URIC ACID mg/dL 2.8*     Results from last 7 days   Lab Units 04/18/25  0454 04/15/25  0356 04/14/25  0356 04/13/25  0448   WBC 10*3/mm3 6.57 5.95 6.47 9.05   HEMOGLOBIN g/dL 8.2* 7.9* 7.7* 8.1*   PLATELETS 10*3/mm3 256 259 244 255           Assessment / Plan     ASSESSMENT:  ESRD.  HD MWF.  Treatment yesterday went well. Vol/lytes stable  Hypotension on midodrine.  Blood pressure stable.  Reduce midodrine 5 to 2.5 mg TID  Falls, generalized weakness.  Diabetes mellitus type 2 with nephropathy, neuropathy.  Mgmt per primary team  Anemia of CKD.  Hemoglobin up slightly 8.2.  Received IV iron with dialysis 4/14/25.     PLAN:  HD monday  Rehab placement in progress ([facility] - dialysis on site)  Can recheck labs MON from my perspective     Damien Elliott MD  04/19/25  08:34 EDT    [facility]  [phone]

## 2025-08-05 ENCOUNTER — HOSPITAL ENCOUNTER (OUTPATIENT)
Facility: HOSPITAL | Age: 70
Setting detail: OBSERVATION
Discharge: NURSING, REHAB OR LONG-TERM CARE | End: 2025-08-09
Attending: EMERGENCY MEDICINE | Admitting: INTERNAL MEDICINE
Payer: MEDICARE

## 2025-08-05 ENCOUNTER — APPOINTMENT (OUTPATIENT)
Dept: CT IMAGING | Facility: HOSPITAL | Age: 70
End: 2025-08-05
Payer: MEDICARE

## 2025-08-05 DIAGNOSIS — K92.1 GASTROINTESTINAL HEMORRHAGE WITH MELENA: ICD-10-CM

## 2025-08-05 DIAGNOSIS — N18.6 ESRD (END STAGE RENAL DISEASE): ICD-10-CM

## 2025-08-05 DIAGNOSIS — D64.9 ANEMIA, UNSPECIFIED TYPE: ICD-10-CM

## 2025-08-05 DIAGNOSIS — K92.2 GASTROINTESTINAL HEMORRHAGE, UNSPECIFIED GASTROINTESTINAL HEMORRHAGE TYPE: Primary | ICD-10-CM

## 2025-08-05 DIAGNOSIS — R93.3 ABNORMAL FINDING ON GI TRACT IMAGING: ICD-10-CM

## 2025-08-05 LAB
ABO GROUP BLD: NORMAL
ALBUMIN SERPL-MCNC: 3.1 G/DL (ref 3.5–5.2)
ALBUMIN/GLOB SERPL: 1.1 G/DL
ALP SERPL-CCNC: 98 U/L (ref 39–117)
ALT SERPL W P-5'-P-CCNC: 12 U/L (ref 1–41)
ANION GAP SERPL CALCULATED.3IONS-SCNC: 10.5 MMOL/L (ref 5–15)
AST SERPL-CCNC: 14 U/L (ref 1–40)
BASOPHILS # BLD AUTO: 0.01 10*3/MM3 (ref 0–0.2)
BASOPHILS NFR BLD AUTO: 0.2 % (ref 0–1.5)
BILIRUB SERPL-MCNC: 0.3 MG/DL (ref 0–1.2)
BLD GP AB SCN SERPL QL: NEGATIVE
BUN SERPL-MCNC: 26 MG/DL (ref 8–23)
BUN/CREAT SERPL: 10.2 (ref 7–25)
CALCIUM SPEC-SCNC: 8.2 MG/DL (ref 8.6–10.5)
CHLORIDE SERPL-SCNC: 102 MMOL/L (ref 98–107)
CO2 SERPL-SCNC: 27.5 MMOL/L (ref 22–29)
CREAT SERPL-MCNC: 2.56 MG/DL (ref 0.76–1.27)
DEPRECATED RDW RBC AUTO: 46.3 FL (ref 37–54)
EGFRCR SERPLBLD CKD-EPI 2021: 26.2 ML/MIN/1.73
EOSINOPHIL # BLD AUTO: 0.12 10*3/MM3 (ref 0–0.4)
EOSINOPHIL NFR BLD AUTO: 2.3 % (ref 0.3–6.2)
ERYTHROCYTE [DISTWIDTH] IN BLOOD BY AUTOMATED COUNT: 14 % (ref 12.3–15.4)
GLOBULIN UR ELPH-MCNC: 2.9 GM/DL
GLUCOSE BLDC GLUCOMTR-MCNC: 257 MG/DL (ref 65–99)
GLUCOSE SERPL-MCNC: 325 MG/DL (ref 65–99)
HCT VFR BLD AUTO: 32.4 % (ref 37.5–51)
HGB BLD-MCNC: 9.7 G/DL (ref 13–17.7)
IMM GRANULOCYTES # BLD AUTO: 0.02 10*3/MM3 (ref 0–0.05)
IMM GRANULOCYTES NFR BLD AUTO: 0.4 % (ref 0–0.5)
INR PPP: 1.45 (ref 0.9–1.1)
LYMPHOCYTES # BLD AUTO: 0.6 10*3/MM3 (ref 0.7–3.1)
LYMPHOCYTES NFR BLD AUTO: 11.4 % (ref 19.6–45.3)
MCH RBC QN AUTO: 27.8 PG (ref 26.6–33)
MCHC RBC AUTO-ENTMCNC: 29.9 G/DL (ref 31.5–35.7)
MCV RBC AUTO: 92.8 FL (ref 79–97)
MONOCYTES # BLD AUTO: 0.47 10*3/MM3 (ref 0.1–0.9)
MONOCYTES NFR BLD AUTO: 9 % (ref 5–12)
NEUTROPHILS NFR BLD AUTO: 4.03 10*3/MM3 (ref 1.7–7)
NEUTROPHILS NFR BLD AUTO: 76.7 % (ref 42.7–76)
NRBC BLD AUTO-RTO: 0 /100 WBC (ref 0–0.2)
PLATELET # BLD AUTO: 228 10*3/MM3 (ref 140–450)
PMV BLD AUTO: 10.5 FL (ref 6–12)
POTASSIUM SERPL-SCNC: 3.2 MMOL/L (ref 3.5–5.2)
PROT SERPL-MCNC: 6 G/DL (ref 6–8.5)
PROTHROMBIN TIME: 17.7 SECONDS (ref 11.7–14.2)
RBC # BLD AUTO: 3.49 10*6/MM3 (ref 4.14–5.8)
RH BLD: POSITIVE
SODIUM SERPL-SCNC: 140 MMOL/L (ref 136–145)
T&S EXPIRATION DATE: NORMAL
WBC NRBC COR # BLD AUTO: 5.25 10*3/MM3 (ref 3.4–10.8)

## 2025-08-05 PROCEDURE — G0378 HOSPITAL OBSERVATION PER HR: HCPCS

## 2025-08-05 PROCEDURE — 74176 CT ABD & PELVIS W/O CONTRAST: CPT

## 2025-08-05 PROCEDURE — 86850 RBC ANTIBODY SCREEN: CPT | Performed by: EMERGENCY MEDICINE

## 2025-08-05 PROCEDURE — 86901 BLOOD TYPING SEROLOGIC RH(D): CPT | Performed by: EMERGENCY MEDICINE

## 2025-08-05 PROCEDURE — 85025 COMPLETE CBC W/AUTO DIFF WBC: CPT | Performed by: EMERGENCY MEDICINE

## 2025-08-05 PROCEDURE — 85610 PROTHROMBIN TIME: CPT | Performed by: EMERGENCY MEDICINE

## 2025-08-05 PROCEDURE — 99285 EMERGENCY DEPT VISIT HI MDM: CPT

## 2025-08-05 PROCEDURE — 63710000001 INSULIN LISPRO (HUMAN) PER 5 UNITS

## 2025-08-05 PROCEDURE — 86900 BLOOD TYPING SEROLOGIC ABO: CPT | Performed by: EMERGENCY MEDICINE

## 2025-08-05 PROCEDURE — 96374 THER/PROPH/DIAG INJ IV PUSH: CPT

## 2025-08-05 PROCEDURE — 82948 REAGENT STRIP/BLOOD GLUCOSE: CPT

## 2025-08-05 PROCEDURE — 80053 COMPREHEN METABOLIC PANEL: CPT | Performed by: EMERGENCY MEDICINE

## 2025-08-05 RX ORDER — IBUPROFEN 600 MG/1
1 TABLET ORAL
Status: DISCONTINUED | OUTPATIENT
Start: 2025-08-05 | End: 2025-08-09 | Stop reason: HOSPADM

## 2025-08-05 RX ORDER — INSULIN LISPRO 100 [IU]/ML
2-7 INJECTION, SOLUTION INTRAVENOUS; SUBCUTANEOUS
Status: DISCONTINUED | OUTPATIENT
Start: 2025-08-05 | End: 2025-08-09 | Stop reason: HOSPADM

## 2025-08-05 RX ORDER — TAMSULOSIN HYDROCHLORIDE 0.4 MG/1
1 CAPSULE ORAL DAILY
COMMUNITY

## 2025-08-05 RX ORDER — DEXTROSE MONOHYDRATE 25 G/50ML
25 INJECTION, SOLUTION INTRAVENOUS
Status: DISCONTINUED | OUTPATIENT
Start: 2025-08-05 | End: 2025-08-09 | Stop reason: HOSPADM

## 2025-08-05 RX ORDER — PANTOPRAZOLE SODIUM 40 MG/10ML
40 INJECTION, POWDER, LYOPHILIZED, FOR SOLUTION INTRAVENOUS EVERY 12 HOURS SCHEDULED
Status: DISCONTINUED | OUTPATIENT
Start: 2025-08-05 | End: 2025-08-09 | Stop reason: HOSPADM

## 2025-08-05 RX ORDER — NITROGLYCERIN 0.4 MG/1
0.4 TABLET SUBLINGUAL
Status: DISCONTINUED | OUTPATIENT
Start: 2025-08-05 | End: 2025-08-09 | Stop reason: HOSPADM

## 2025-08-05 RX ORDER — LOPERAMIDE HYDROCHLORIDE 2 MG/1
2 CAPSULE ORAL 2 TIMES DAILY PRN
COMMUNITY

## 2025-08-05 RX ORDER — SODIUM CHLORIDE 0.9 % (FLUSH) 0.9 %
10 SYRINGE (ML) INJECTION AS NEEDED
Status: DISCONTINUED | OUTPATIENT
Start: 2025-08-05 | End: 2025-08-09 | Stop reason: HOSPADM

## 2025-08-05 RX ORDER — ONDANSETRON 2 MG/ML
4 INJECTION INTRAMUSCULAR; INTRAVENOUS EVERY 6 HOURS PRN
Status: DISCONTINUED | OUTPATIENT
Start: 2025-08-05 | End: 2025-08-09 | Stop reason: HOSPADM

## 2025-08-05 RX ORDER — ACETAMINOPHEN 325 MG/1
650 TABLET ORAL EVERY 4 HOURS PRN
Status: DISCONTINUED | OUTPATIENT
Start: 2025-08-05 | End: 2025-08-09 | Stop reason: HOSPADM

## 2025-08-05 RX ORDER — ONDANSETRON 4 MG/1
4 TABLET, ORALLY DISINTEGRATING ORAL EVERY 6 HOURS PRN
Status: DISCONTINUED | OUTPATIENT
Start: 2025-08-05 | End: 2025-08-09 | Stop reason: HOSPADM

## 2025-08-05 RX ORDER — NICOTINE POLACRILEX 4 MG
15 LOZENGE BUCCAL
Status: DISCONTINUED | OUTPATIENT
Start: 2025-08-05 | End: 2025-08-09 | Stop reason: HOSPADM

## 2025-08-05 RX ADMIN — INSULIN LISPRO 4 UNITS: 100 INJECTION, SOLUTION INTRAVENOUS; SUBCUTANEOUS at 21:56

## 2025-08-05 RX ADMIN — PANTOPRAZOLE SODIUM 40 MG: 40 INJECTION, POWDER, FOR SOLUTION INTRAVENOUS at 21:56

## 2025-08-06 PROBLEM — R93.3 ABNORMAL FINDING ON GI TRACT IMAGING: Status: ACTIVE | Noted: 2025-08-05

## 2025-08-06 PROBLEM — D64.9 ANEMIA: Status: ACTIVE | Noted: 2025-08-06

## 2025-08-06 PROBLEM — E87.6 HYPOKALEMIA: Status: ACTIVE | Noted: 2025-08-06

## 2025-08-06 LAB
ALBUMIN SERPL-MCNC: 3 G/DL (ref 3.5–5.2)
ANION GAP SERPL CALCULATED.3IONS-SCNC: 10 MMOL/L (ref 5–15)
BUN SERPL-MCNC: 27 MG/DL (ref 8–23)
BUN/CREAT SERPL: 9.4 (ref 7–25)
CALCIUM SPEC-SCNC: 8.2 MG/DL (ref 8.6–10.5)
CHLORIDE SERPL-SCNC: 100 MMOL/L (ref 98–107)
CO2 SERPL-SCNC: 29 MMOL/L (ref 22–29)
CREAT SERPL-MCNC: 2.88 MG/DL (ref 0.76–1.27)
DEPRECATED RDW RBC AUTO: 46.6 FL (ref 37–54)
EGFRCR SERPLBLD CKD-EPI 2021: 22.8 ML/MIN/1.73
ERYTHROCYTE [DISTWIDTH] IN BLOOD BY AUTOMATED COUNT: 14.2 % (ref 12.3–15.4)
GLUCOSE BLDC GLUCOMTR-MCNC: 115 MG/DL (ref 65–99)
GLUCOSE BLDC GLUCOMTR-MCNC: 192 MG/DL (ref 65–99)
GLUCOSE BLDC GLUCOMTR-MCNC: 92 MG/DL (ref 65–99)
GLUCOSE BLDC GLUCOMTR-MCNC: 94 MG/DL (ref 65–99)
GLUCOSE SERPL-MCNC: 100 MG/DL (ref 65–99)
HCT VFR BLD AUTO: 28.8 % (ref 37.5–51)
HCT VFR BLD AUTO: 28.8 % (ref 37.5–51)
HCT VFR BLD AUTO: 31.4 % (ref 37.5–51)
HCT VFR BLD AUTO: 33 % (ref 37.5–51)
HCT VFR BLD AUTO: 33.2 % (ref 37.5–51)
HGB BLD-MCNC: 10.2 G/DL (ref 13–17.7)
HGB BLD-MCNC: 10.5 G/DL (ref 13–17.7)
HGB BLD-MCNC: 10.7 G/DL (ref 13–17.7)
HGB BLD-MCNC: 9 G/DL (ref 13–17.7)
HGB BLD-MCNC: 9 G/DL (ref 13–17.7)
MAGNESIUM SERPL-MCNC: 1.7 MG/DL (ref 1.6–2.4)
MCH RBC QN AUTO: 28 PG (ref 26.6–33)
MCHC RBC AUTO-ENTMCNC: 31.3 G/DL (ref 31.5–35.7)
MCV RBC AUTO: 89.7 FL (ref 79–97)
PHOSPHATE SERPL-MCNC: 3 MG/DL (ref 2.5–4.5)
PLATELET # BLD AUTO: 214 10*3/MM3 (ref 140–450)
PMV BLD AUTO: 10.2 FL (ref 6–12)
POTASSIUM SERPL-SCNC: 3.1 MMOL/L (ref 3.5–5.2)
RBC # BLD AUTO: 3.21 10*6/MM3 (ref 4.14–5.8)
SODIUM SERPL-SCNC: 139 MMOL/L (ref 136–145)
WBC NRBC COR # BLD AUTO: 8.07 10*3/MM3 (ref 3.4–10.8)

## 2025-08-06 PROCEDURE — 97162 PT EVAL MOD COMPLEX 30 MIN: CPT

## 2025-08-06 PROCEDURE — 97110 THERAPEUTIC EXERCISES: CPT

## 2025-08-06 PROCEDURE — 80069 RENAL FUNCTION PANEL: CPT | Performed by: INTERNAL MEDICINE

## 2025-08-06 PROCEDURE — 85014 HEMATOCRIT: CPT

## 2025-08-06 PROCEDURE — 63710000001 INSULIN LISPRO (HUMAN) PER 5 UNITS

## 2025-08-06 PROCEDURE — 36415 COLL VENOUS BLD VENIPUNCTURE: CPT

## 2025-08-06 PROCEDURE — 97535 SELF CARE MNGMENT TRAINING: CPT

## 2025-08-06 PROCEDURE — G0378 HOSPITAL OBSERVATION PER HR: HCPCS

## 2025-08-06 PROCEDURE — 82948 REAGENT STRIP/BLOOD GLUCOSE: CPT

## 2025-08-06 PROCEDURE — 83735 ASSAY OF MAGNESIUM: CPT | Performed by: INTERNAL MEDICINE

## 2025-08-06 PROCEDURE — 85018 HEMOGLOBIN: CPT

## 2025-08-06 PROCEDURE — 99214 OFFICE O/P EST MOD 30 MIN: CPT

## 2025-08-06 PROCEDURE — 85027 COMPLETE CBC AUTOMATED: CPT

## 2025-08-06 PROCEDURE — G0257 UNSCHED DIALYSIS ESRD PT HOS: HCPCS

## 2025-08-06 PROCEDURE — 96376 TX/PRO/DX INJ SAME DRUG ADON: CPT

## 2025-08-06 PROCEDURE — 97166 OT EVAL MOD COMPLEX 45 MIN: CPT

## 2025-08-06 RX ORDER — POLYETHYLENE GLYCOL 3350 17 G/17G
0.5 POWDER, FOR SOLUTION ORAL EVERY 12 HOURS
Status: COMPLETED | OUTPATIENT
Start: 2025-08-06 | End: 2025-08-07

## 2025-08-06 RX ORDER — FLUTICASONE PROPIONATE 50 MCG
2 SPRAY, SUSPENSION (ML) NASAL DAILY
Status: DISCONTINUED | OUTPATIENT
Start: 2025-08-06 | End: 2025-08-09 | Stop reason: HOSPADM

## 2025-08-06 RX ORDER — MIDODRINE HYDROCHLORIDE 5 MG/1
5 TABLET ORAL
Status: DISCONTINUED | OUTPATIENT
Start: 2025-08-06 | End: 2025-08-09 | Stop reason: HOSPADM

## 2025-08-06 RX ORDER — LEVOTHYROXINE SODIUM 75 UG/1
75 TABLET ORAL
Status: DISCONTINUED | OUTPATIENT
Start: 2025-08-06 | End: 2025-08-09 | Stop reason: HOSPADM

## 2025-08-06 RX ORDER — ALBUMIN (HUMAN) 12.5 G/50ML
12.5 SOLUTION INTRAVENOUS AS NEEDED
Status: CANCELLED | OUTPATIENT
Start: 2025-08-06 | End: 2025-08-07

## 2025-08-06 RX ORDER — ATORVASTATIN CALCIUM 80 MG/1
80 TABLET, FILM COATED ORAL NIGHTLY
Status: DISCONTINUED | OUTPATIENT
Start: 2025-08-06 | End: 2025-08-09 | Stop reason: HOSPADM

## 2025-08-06 RX ORDER — ASPIRIN 81 MG/1
81 TABLET ORAL DAILY
Status: DISCONTINUED | OUTPATIENT
Start: 2025-08-06 | End: 2025-08-09 | Stop reason: HOSPADM

## 2025-08-06 RX ORDER — TAMSULOSIN HYDROCHLORIDE 0.4 MG/1
0.4 CAPSULE ORAL DAILY
Status: DISCONTINUED | OUTPATIENT
Start: 2025-08-06 | End: 2025-08-09 | Stop reason: HOSPADM

## 2025-08-06 RX ADMIN — LEVOTHYROXINE SODIUM 75 MCG: 0.07 TABLET ORAL at 06:48

## 2025-08-06 RX ADMIN — ATORVASTATIN CALCIUM 80 MG: 80 TABLET, FILM COATED ORAL at 21:33

## 2025-08-06 RX ADMIN — MIDODRINE HYDROCHLORIDE 5 MG: 5 TABLET ORAL at 14:22

## 2025-08-06 RX ADMIN — POLYETHYLENE GLYCOL 3350 0.5 BOTTLE: 17 POWDER, FOR SOLUTION ORAL at 17:39

## 2025-08-06 RX ADMIN — TAMSULOSIN HYDROCHLORIDE 0.4 MG: 0.4 CAPSULE ORAL at 14:21

## 2025-08-06 RX ADMIN — PANTOPRAZOLE SODIUM 40 MG: 40 INJECTION, POWDER, FOR SOLUTION INTRAVENOUS at 21:33

## 2025-08-06 RX ADMIN — INSULIN LISPRO 2 UNITS: 100 INJECTION, SOLUTION INTRAVENOUS; SUBCUTANEOUS at 21:33

## 2025-08-06 RX ADMIN — FLUTICASONE PROPIONATE 2 SPRAY: 50 SPRAY, METERED NASAL at 14:22

## 2025-08-06 RX ADMIN — PANTOPRAZOLE SODIUM 40 MG: 40 INJECTION, POWDER, FOR SOLUTION INTRAVENOUS at 14:22

## 2025-08-06 RX ADMIN — MIDODRINE HYDROCHLORIDE 5 MG: 5 TABLET ORAL at 17:39

## 2025-08-07 ENCOUNTER — ANESTHESIA (OUTPATIENT)
Dept: GASTROENTEROLOGY | Facility: HOSPITAL | Age: 70
End: 2025-08-07
Payer: MEDICARE

## 2025-08-07 ENCOUNTER — ANESTHESIA EVENT (OUTPATIENT)
Dept: GASTROENTEROLOGY | Facility: HOSPITAL | Age: 70
End: 2025-08-07
Payer: MEDICARE

## 2025-08-07 ENCOUNTER — APPOINTMENT (OUTPATIENT)
Dept: GENERAL RADIOLOGY | Facility: HOSPITAL | Age: 70
End: 2025-08-07
Payer: MEDICARE

## 2025-08-07 LAB
ALBUMIN SERPL-MCNC: 3.2 G/DL (ref 3.5–5.2)
ANION GAP SERPL CALCULATED.3IONS-SCNC: 9.4 MMOL/L (ref 5–15)
BASOPHILS # BLD AUTO: 0.01 10*3/MM3 (ref 0–0.2)
BASOPHILS NFR BLD AUTO: 0.2 % (ref 0–1.5)
BUN SERPL-MCNC: 14 MG/DL (ref 8–23)
BUN/CREAT SERPL: 6.5 (ref 7–25)
CALCIUM SPEC-SCNC: 8.7 MG/DL (ref 8.6–10.5)
CHLORIDE SERPL-SCNC: 94 MMOL/L (ref 98–107)
CO2 SERPL-SCNC: 25.6 MMOL/L (ref 22–29)
CREAT SERPL-MCNC: 2.14 MG/DL (ref 0.76–1.27)
DEPRECATED RDW RBC AUTO: 45.2 FL (ref 37–54)
EGFRCR SERPLBLD CKD-EPI 2021: 32.5 ML/MIN/1.73
EOSINOPHIL # BLD AUTO: 0.23 10*3/MM3 (ref 0–0.4)
EOSINOPHIL NFR BLD AUTO: 5.2 % (ref 0.3–6.2)
ERYTHROCYTE [DISTWIDTH] IN BLOOD BY AUTOMATED COUNT: 14 % (ref 12.3–15.4)
GLUCOSE BLDC GLUCOMTR-MCNC: 118 MG/DL (ref 65–99)
GLUCOSE BLDC GLUCOMTR-MCNC: 166 MG/DL (ref 65–99)
GLUCOSE BLDC GLUCOMTR-MCNC: 95 MG/DL (ref 65–99)
GLUCOSE BLDC GLUCOMTR-MCNC: 99 MG/DL (ref 65–99)
GLUCOSE SERPL-MCNC: 103 MG/DL (ref 65–99)
HCT VFR BLD AUTO: 28.8 % (ref 37.5–51)
HCT VFR BLD AUTO: 29.3 % (ref 37.5–51)
HCT VFR BLD AUTO: 31.6 % (ref 37.5–51)
HCT VFR BLD AUTO: 33 % (ref 37.5–51)
HCT VFR BLD AUTO: 33 % (ref 37.5–51)
HGB BLD-MCNC: 10.3 G/DL (ref 13–17.7)
HGB BLD-MCNC: 10.3 G/DL (ref 13–17.7)
HGB BLD-MCNC: 10.5 G/DL (ref 13–17.7)
HGB BLD-MCNC: 9.4 G/DL (ref 13–17.7)
HGB BLD-MCNC: 9.4 G/DL (ref 13–17.7)
IMM GRANULOCYTES # BLD AUTO: 0.02 10*3/MM3 (ref 0–0.05)
IMM GRANULOCYTES NFR BLD AUTO: 0.4 % (ref 0–0.5)
LYMPHOCYTES # BLD AUTO: 0.89 10*3/MM3 (ref 0.7–3.1)
LYMPHOCYTES NFR BLD AUTO: 20 % (ref 19.6–45.3)
MCH RBC QN AUTO: 27.8 PG (ref 26.6–33)
MCHC RBC AUTO-ENTMCNC: 31.2 G/DL (ref 31.5–35.7)
MCV RBC AUTO: 89.2 FL (ref 79–97)
MONOCYTES # BLD AUTO: 0.51 10*3/MM3 (ref 0.1–0.9)
MONOCYTES NFR BLD AUTO: 11.4 % (ref 5–12)
NEUTROPHILS NFR BLD AUTO: 2.8 10*3/MM3 (ref 1.7–7)
NEUTROPHILS NFR BLD AUTO: 62.8 % (ref 42.7–76)
NRBC BLD AUTO-RTO: 0 /100 WBC (ref 0–0.2)
PHOSPHATE SERPL-MCNC: 2.4 MG/DL (ref 2.5–4.5)
PLATELET # BLD AUTO: 256 10*3/MM3 (ref 140–450)
PMV BLD AUTO: 9.9 FL (ref 6–12)
POTASSIUM SERPL-SCNC: 4.1 MMOL/L (ref 3.5–5.2)
RBC # BLD AUTO: 3.7 10*6/MM3 (ref 4.14–5.8)
SODIUM SERPL-SCNC: 129 MMOL/L (ref 136–145)
WBC NRBC COR # BLD AUTO: 4.46 10*3/MM3 (ref 3.4–10.8)

## 2025-08-07 PROCEDURE — 85025 COMPLETE CBC W/AUTO DIFF WBC: CPT | Performed by: STUDENT IN AN ORGANIZED HEALTH CARE EDUCATION/TRAINING PROGRAM

## 2025-08-07 PROCEDURE — G0378 HOSPITAL OBSERVATION PER HR: HCPCS

## 2025-08-07 PROCEDURE — 96376 TX/PRO/DX INJ SAME DRUG ADON: CPT

## 2025-08-07 PROCEDURE — 85014 HEMATOCRIT: CPT | Performed by: INTERNAL MEDICINE

## 2025-08-07 PROCEDURE — 85014 HEMATOCRIT: CPT

## 2025-08-07 PROCEDURE — 25010000002 PROPOFOL 200 MG/20ML EMULSION: Performed by: ANESTHESIOLOGY

## 2025-08-07 PROCEDURE — 71045 X-RAY EXAM CHEST 1 VIEW: CPT

## 2025-08-07 PROCEDURE — 43235 EGD DIAGNOSTIC BRUSH WASH: CPT | Performed by: INTERNAL MEDICINE

## 2025-08-07 PROCEDURE — 80069 RENAL FUNCTION PANEL: CPT | Performed by: INTERNAL MEDICINE

## 2025-08-07 PROCEDURE — 25810000003 LACTATED RINGERS PER 1000 ML: Performed by: ANESTHESIOLOGY

## 2025-08-07 PROCEDURE — 82948 REAGENT STRIP/BLOOD GLUCOSE: CPT

## 2025-08-07 PROCEDURE — 36415 COLL VENOUS BLD VENIPUNCTURE: CPT | Performed by: INTERNAL MEDICINE

## 2025-08-07 PROCEDURE — 85018 HEMOGLOBIN: CPT | Performed by: INTERNAL MEDICINE

## 2025-08-07 PROCEDURE — 82948 REAGENT STRIP/BLOOD GLUCOSE: CPT | Performed by: INTERNAL MEDICINE

## 2025-08-07 PROCEDURE — 45378 DIAGNOSTIC COLONOSCOPY: CPT | Performed by: INTERNAL MEDICINE

## 2025-08-07 PROCEDURE — 25010000002 LIDOCAINE 2% SOLUTION: Performed by: ANESTHESIOLOGY

## 2025-08-07 PROCEDURE — 25010000002 GLYCOPYRROLATE 0.2 MG/ML SOLUTION: Performed by: ANESTHESIOLOGY

## 2025-08-07 PROCEDURE — 85018 HEMOGLOBIN: CPT

## 2025-08-07 PROCEDURE — 25010000002 PROPOFOL 1000 MG/100ML EMULSION: Performed by: ANESTHESIOLOGY

## 2025-08-07 PROCEDURE — 63710000001 INSULIN LISPRO (HUMAN) PER 5 UNITS: Performed by: INTERNAL MEDICINE

## 2025-08-07 RX ORDER — GLYCOPYRROLATE 0.2 MG/ML
INJECTION INTRAMUSCULAR; INTRAVENOUS AS NEEDED
Status: DISCONTINUED | OUTPATIENT
Start: 2025-08-07 | End: 2025-08-07 | Stop reason: SURG

## 2025-08-07 RX ORDER — LIDOCAINE HYDROCHLORIDE 20 MG/ML
INJECTION, SOLUTION INFILTRATION; PERINEURAL AS NEEDED
Status: DISCONTINUED | OUTPATIENT
Start: 2025-08-07 | End: 2025-08-07 | Stop reason: SURG

## 2025-08-07 RX ORDER — PROPOFOL 10 MG/ML
INJECTION, EMULSION INTRAVENOUS AS NEEDED
Status: DISCONTINUED | OUTPATIENT
Start: 2025-08-07 | End: 2025-08-07 | Stop reason: SURG

## 2025-08-07 RX ORDER — PROPOFOL 10 MG/ML
INJECTION, EMULSION INTRAVENOUS CONTINUOUS PRN
Status: DISCONTINUED | OUTPATIENT
Start: 2025-08-07 | End: 2025-08-07 | Stop reason: SURG

## 2025-08-07 RX ORDER — SODIUM CHLORIDE, SODIUM LACTATE, POTASSIUM CHLORIDE, CALCIUM CHLORIDE 600; 310; 30; 20 MG/100ML; MG/100ML; MG/100ML; MG/100ML
INJECTION, SOLUTION INTRAVENOUS CONTINUOUS PRN
Status: DISCONTINUED | OUTPATIENT
Start: 2025-08-07 | End: 2025-08-07 | Stop reason: SURG

## 2025-08-07 RX ORDER — ALBUMIN (HUMAN) 12.5 G/50ML
12.5 SOLUTION INTRAVENOUS AS NEEDED
Status: CANCELLED | OUTPATIENT
Start: 2025-08-08 | End: 2025-08-08

## 2025-08-07 RX ADMIN — MIDODRINE HYDROCHLORIDE 5 MG: 5 TABLET ORAL at 11:54

## 2025-08-07 RX ADMIN — FLUTICASONE PROPIONATE 2 SPRAY: 50 SPRAY, METERED NASAL at 09:24

## 2025-08-07 RX ADMIN — INSULIN LISPRO 2 UNITS: 100 INJECTION, SOLUTION INTRAVENOUS; SUBCUTANEOUS at 21:03

## 2025-08-07 RX ADMIN — TAMSULOSIN HYDROCHLORIDE 0.4 MG: 0.4 CAPSULE ORAL at 11:54

## 2025-08-07 RX ADMIN — ATORVASTATIN CALCIUM 80 MG: 80 TABLET, FILM COATED ORAL at 21:03

## 2025-08-07 RX ADMIN — SODIUM CHLORIDE, POTASSIUM CHLORIDE, SODIUM LACTATE AND CALCIUM CHLORIDE: 600; 310; 30; 20 INJECTION, SOLUTION INTRAVENOUS at 10:42

## 2025-08-07 RX ADMIN — POLYETHYLENE GLYCOL-3350 AND ELECTROLYTES 2000 ML: 236; 6.74; 5.86; 2.97; 22.74 POWDER, FOR SOLUTION ORAL at 16:33

## 2025-08-07 RX ADMIN — PANTOPRAZOLE SODIUM 40 MG: 40 INJECTION, POWDER, FOR SOLUTION INTRAVENOUS at 21:03

## 2025-08-07 RX ADMIN — GLYCOPYRROLATE 0.2 MG: 0.2 INJECTION INTRAMUSCULAR; INTRAVENOUS at 10:56

## 2025-08-07 RX ADMIN — PROPOFOL INJECTABLE EMULSION 100 MG: 10 INJECTION, EMULSION INTRAVENOUS at 10:47

## 2025-08-07 RX ADMIN — POLYETHYLENE GLYCOL 3350 0.5 BOTTLE: 17 POWDER, FOR SOLUTION ORAL at 01:54

## 2025-08-07 RX ADMIN — PANTOPRAZOLE SODIUM 40 MG: 40 INJECTION, POWDER, FOR SOLUTION INTRAVENOUS at 09:24

## 2025-08-07 RX ADMIN — PROPOFOL 100 MCG/KG/MIN: 10 INJECTION, EMULSION INTRAVENOUS at 10:47

## 2025-08-07 RX ADMIN — LIDOCAINE HYDROCHLORIDE 100 MG: 20 INJECTION, SOLUTION INFILTRATION; PERINEURAL at 10:45

## 2025-08-08 ENCOUNTER — ANESTHESIA EVENT (OUTPATIENT)
Dept: GASTROENTEROLOGY | Facility: HOSPITAL | Age: 70
End: 2025-08-08
Payer: MEDICARE

## 2025-08-08 ENCOUNTER — ANESTHESIA (OUTPATIENT)
Dept: GASTROENTEROLOGY | Facility: HOSPITAL | Age: 70
End: 2025-08-08
Payer: MEDICARE

## 2025-08-08 LAB
BASOPHILS # BLD AUTO: 0.01 10*3/MM3 (ref 0–0.2)
BASOPHILS NFR BLD AUTO: 0.2 % (ref 0–1.5)
DEPRECATED RDW RBC AUTO: 45.2 FL (ref 37–54)
EOSINOPHIL # BLD AUTO: 0.18 10*3/MM3 (ref 0–0.4)
EOSINOPHIL NFR BLD AUTO: 3 % (ref 0.3–6.2)
ERYTHROCYTE [DISTWIDTH] IN BLOOD BY AUTOMATED COUNT: 14.3 % (ref 12.3–15.4)
GLUCOSE BLDC GLUCOMTR-MCNC: 132 MG/DL (ref 65–99)
GLUCOSE BLDC GLUCOMTR-MCNC: 214 MG/DL (ref 65–99)
GLUCOSE BLDC GLUCOMTR-MCNC: 77 MG/DL (ref 65–99)
GLUCOSE BLDC GLUCOMTR-MCNC: 83 MG/DL (ref 65–99)
GLUCOSE BLDC GLUCOMTR-MCNC: 92 MG/DL (ref 65–99)
HCT VFR BLD AUTO: 30.7 % (ref 37.5–51)
HCT VFR BLD AUTO: 32.3 % (ref 37.5–51)
HCT VFR BLD AUTO: 34.2 % (ref 37.5–51)
HGB BLD-MCNC: 10.7 G/DL (ref 13–17.7)
HGB BLD-MCNC: 10.7 G/DL (ref 13–17.7)
HGB BLD-MCNC: 9.8 G/DL (ref 13–17.7)
IMM GRANULOCYTES # BLD AUTO: 0.03 10*3/MM3 (ref 0–0.05)
IMM GRANULOCYTES NFR BLD AUTO: 0.5 % (ref 0–0.5)
LYMPHOCYTES # BLD AUTO: 1.05 10*3/MM3 (ref 0.7–3.1)
LYMPHOCYTES NFR BLD AUTO: 17.6 % (ref 19.6–45.3)
MCH RBC QN AUTO: 27.4 PG (ref 26.6–33)
MCHC RBC AUTO-ENTMCNC: 31.3 G/DL (ref 31.5–35.7)
MCV RBC AUTO: 87.7 FL (ref 79–97)
MONOCYTES # BLD AUTO: 0.5 10*3/MM3 (ref 0.1–0.9)
MONOCYTES NFR BLD AUTO: 8.4 % (ref 5–12)
NEUTROPHILS NFR BLD AUTO: 4.18 10*3/MM3 (ref 1.7–7)
NEUTROPHILS NFR BLD AUTO: 70.3 % (ref 42.7–76)
NRBC BLD AUTO-RTO: 0 /100 WBC (ref 0–0.2)
PLATELET # BLD AUTO: 289 10*3/MM3 (ref 140–450)
PMV BLD AUTO: 9.6 FL (ref 6–12)
RBC # BLD AUTO: 3.9 10*6/MM3 (ref 4.14–5.8)
WBC NRBC COR # BLD AUTO: 5.95 10*3/MM3 (ref 3.4–10.8)

## 2025-08-08 PROCEDURE — 63710000001 INSULIN LISPRO (HUMAN) PER 5 UNITS: Performed by: INTERNAL MEDICINE

## 2025-08-08 PROCEDURE — 45385 COLONOSCOPY W/LESION REMOVAL: CPT | Performed by: INTERNAL MEDICINE

## 2025-08-08 PROCEDURE — 85018 HEMOGLOBIN: CPT | Performed by: INTERNAL MEDICINE

## 2025-08-08 PROCEDURE — G0378 HOSPITAL OBSERVATION PER HR: HCPCS

## 2025-08-08 PROCEDURE — 85025 COMPLETE CBC W/AUTO DIFF WBC: CPT | Performed by: INTERNAL MEDICINE

## 2025-08-08 PROCEDURE — 82948 REAGENT STRIP/BLOOD GLUCOSE: CPT

## 2025-08-08 PROCEDURE — 25010000002 LIDOCAINE 2% SOLUTION: Performed by: ANESTHESIOLOGY

## 2025-08-08 PROCEDURE — 82948 REAGENT STRIP/BLOOD GLUCOSE: CPT | Performed by: INTERNAL MEDICINE

## 2025-08-08 PROCEDURE — 82948 REAGENT STRIP/BLOOD GLUCOSE: CPT | Performed by: ANESTHESIOLOGY

## 2025-08-08 PROCEDURE — 45381 COLONOSCOPY SUBMUCOUS NJX: CPT | Performed by: INTERNAL MEDICINE

## 2025-08-08 PROCEDURE — 97530 THERAPEUTIC ACTIVITIES: CPT

## 2025-08-08 PROCEDURE — 36415 COLL VENOUS BLD VENIPUNCTURE: CPT | Performed by: INTERNAL MEDICINE

## 2025-08-08 PROCEDURE — G0257 UNSCHED DIALYSIS ESRD PT HOS: HCPCS

## 2025-08-08 PROCEDURE — 85014 HEMATOCRIT: CPT | Performed by: INTERNAL MEDICINE

## 2025-08-08 PROCEDURE — 25010000002 PROPOFOL 200 MG/20ML EMULSION: Performed by: ANESTHESIOLOGY

## 2025-08-08 PROCEDURE — 88305 TISSUE EXAM BY PATHOLOGIST: CPT | Performed by: INTERNAL MEDICINE

## 2025-08-08 PROCEDURE — 97535 SELF CARE MNGMENT TRAINING: CPT

## 2025-08-08 DEVICE — REPLAY HEMOSTASIS CLIP, 16MM SPAN
Type: IMPLANTABLE DEVICE | Site: ASCENDING COLON | Status: FUNCTIONAL
Brand: REPLAY

## 2025-08-08 RX ORDER — NALOXONE HCL 0.4 MG/ML
0.2 VIAL (ML) INJECTION AS NEEDED
Status: DISCONTINUED | OUTPATIENT
Start: 2025-08-08 | End: 2025-08-08

## 2025-08-08 RX ORDER — ONDANSETRON 2 MG/ML
4 INJECTION INTRAMUSCULAR; INTRAVENOUS ONCE AS NEEDED
Status: DISCONTINUED | OUTPATIENT
Start: 2025-08-08 | End: 2025-08-08

## 2025-08-08 RX ORDER — IPRATROPIUM BROMIDE AND ALBUTEROL SULFATE 2.5; .5 MG/3ML; MG/3ML
3 SOLUTION RESPIRATORY (INHALATION) ONCE AS NEEDED
Status: DISCONTINUED | OUTPATIENT
Start: 2025-08-08 | End: 2025-08-08

## 2025-08-08 RX ORDER — FLUMAZENIL 0.1 MG/ML
0.2 INJECTION INTRAVENOUS AS NEEDED
Status: DISCONTINUED | OUTPATIENT
Start: 2025-08-08 | End: 2025-08-08

## 2025-08-08 RX ORDER — DIPHENHYDRAMINE HYDROCHLORIDE 50 MG/ML
12.5 INJECTION, SOLUTION INTRAMUSCULAR; INTRAVENOUS
Status: DISCONTINUED | OUTPATIENT
Start: 2025-08-08 | End: 2025-08-08

## 2025-08-08 RX ORDER — HYDROCODONE BITARTRATE AND ACETAMINOPHEN 5; 325 MG/1; MG/1
1 TABLET ORAL ONCE AS NEEDED
Refills: 0 | Status: DISCONTINUED | OUTPATIENT
Start: 2025-08-08 | End: 2025-08-08

## 2025-08-08 RX ORDER — LIDOCAINE HYDROCHLORIDE 20 MG/ML
INJECTION, SOLUTION INFILTRATION; PERINEURAL AS NEEDED
Status: DISCONTINUED | OUTPATIENT
Start: 2025-08-08 | End: 2025-08-08 | Stop reason: SURG

## 2025-08-08 RX ORDER — PROMETHAZINE HYDROCHLORIDE 25 MG/1
25 TABLET ORAL ONCE AS NEEDED
Status: DISCONTINUED | OUTPATIENT
Start: 2025-08-08 | End: 2025-08-08

## 2025-08-08 RX ORDER — HYDROCODONE BITARTRATE AND ACETAMINOPHEN 7.5; 325 MG/1; MG/1
1 TABLET ORAL EVERY 4 HOURS PRN
Refills: 0 | Status: DISCONTINUED | OUTPATIENT
Start: 2025-08-08 | End: 2025-08-08

## 2025-08-08 RX ORDER — HYDRALAZINE HYDROCHLORIDE 20 MG/ML
5 INJECTION INTRAMUSCULAR; INTRAVENOUS
Status: DISCONTINUED | OUTPATIENT
Start: 2025-08-08 | End: 2025-08-08

## 2025-08-08 RX ORDER — LABETALOL HYDROCHLORIDE 5 MG/ML
5 INJECTION, SOLUTION INTRAVENOUS
Status: DISCONTINUED | OUTPATIENT
Start: 2025-08-08 | End: 2025-08-08

## 2025-08-08 RX ORDER — EPHEDRINE SULFATE 50 MG/ML
5 INJECTION, SOLUTION INTRAVENOUS ONCE AS NEEDED
Status: DISCONTINUED | OUTPATIENT
Start: 2025-08-08 | End: 2025-08-08

## 2025-08-08 RX ORDER — FENTANYL CITRATE 50 UG/ML
25 INJECTION, SOLUTION INTRAMUSCULAR; INTRAVENOUS
Status: DISCONTINUED | OUTPATIENT
Start: 2025-08-08 | End: 2025-08-08

## 2025-08-08 RX ORDER — HYDROMORPHONE HYDROCHLORIDE 1 MG/ML
0.25 INJECTION, SOLUTION INTRAMUSCULAR; INTRAVENOUS; SUBCUTANEOUS
Refills: 0 | Status: DISCONTINUED | OUTPATIENT
Start: 2025-08-08 | End: 2025-08-08

## 2025-08-08 RX ORDER — DROPERIDOL 2.5 MG/ML
0.62 INJECTION, SOLUTION INTRAMUSCULAR; INTRAVENOUS
Status: DISCONTINUED | OUTPATIENT
Start: 2025-08-08 | End: 2025-08-08

## 2025-08-08 RX ORDER — PROMETHAZINE HYDROCHLORIDE 25 MG/1
25 SUPPOSITORY RECTAL ONCE AS NEEDED
Status: DISCONTINUED | OUTPATIENT
Start: 2025-08-08 | End: 2025-08-08

## 2025-08-08 RX ORDER — PROPOFOL 10 MG/ML
INJECTION, EMULSION INTRAVENOUS CONTINUOUS PRN
Status: DISCONTINUED | OUTPATIENT
Start: 2025-08-08 | End: 2025-08-08 | Stop reason: SURG

## 2025-08-08 RX ORDER — ATROPINE SULFATE 0.4 MG/ML
0.4 INJECTION, SOLUTION INTRAMUSCULAR; INTRAVENOUS; SUBCUTANEOUS ONCE AS NEEDED
Status: DISCONTINUED | OUTPATIENT
Start: 2025-08-08 | End: 2025-08-08

## 2025-08-08 RX ADMIN — TAMSULOSIN HYDROCHLORIDE 0.4 MG: 0.4 CAPSULE ORAL at 17:46

## 2025-08-08 RX ADMIN — ATORVASTATIN CALCIUM 80 MG: 80 TABLET, FILM COATED ORAL at 21:14

## 2025-08-08 RX ADMIN — Medication 10 ML: at 21:14

## 2025-08-08 RX ADMIN — PANTOPRAZOLE SODIUM 40 MG: 40 INJECTION, POWDER, FOR SOLUTION INTRAVENOUS at 21:14

## 2025-08-08 RX ADMIN — LIDOCAINE HYDROCHLORIDE 60 MG: 20 INJECTION, SOLUTION INFILTRATION; PERINEURAL at 13:08

## 2025-08-08 RX ADMIN — INSULIN LISPRO 3 UNITS: 100 INJECTION, SOLUTION INTRAVENOUS; SUBCUTANEOUS at 21:14

## 2025-08-08 RX ADMIN — FLUTICASONE PROPIONATE 2 SPRAY: 50 SPRAY, METERED NASAL at 10:05

## 2025-08-08 RX ADMIN — LEVOTHYROXINE SODIUM 75 MCG: 0.07 TABLET ORAL at 05:38

## 2025-08-08 RX ADMIN — PROPOFOL 200 MCG/KG/MIN: 10 INJECTION, EMULSION INTRAVENOUS at 13:07

## 2025-08-08 RX ADMIN — POLYETHYLENE GLYCOL-3350 AND ELECTROLYTES 2000 ML: 236; 6.74; 5.86; 2.97; 22.74 POWDER, FOR SOLUTION ORAL at 04:00

## 2025-08-08 RX ADMIN — PANTOPRAZOLE SODIUM 40 MG: 40 INJECTION, POWDER, FOR SOLUTION INTRAVENOUS at 08:40

## 2025-08-09 VITALS
DIASTOLIC BLOOD PRESSURE: 68 MMHG | SYSTOLIC BLOOD PRESSURE: 131 MMHG | OXYGEN SATURATION: 96 % | WEIGHT: 147.05 LBS | HEIGHT: 72 IN | BODY MASS INDEX: 19.92 KG/M2 | HEART RATE: 79 BPM | RESPIRATION RATE: 18 BRPM | TEMPERATURE: 97.5 F

## 2025-08-09 PROBLEM — E87.6 HYPOKALEMIA: Status: RESOLVED | Noted: 2025-08-06 | Resolved: 2025-08-09

## 2025-08-09 PROBLEM — R93.3 ABNORMAL FINDING ON GI TRACT IMAGING: Status: RESOLVED | Noted: 2025-08-05 | Resolved: 2025-08-09

## 2025-08-09 PROBLEM — K92.2 GI BLEED: Status: RESOLVED | Noted: 2025-08-05 | Resolved: 2025-08-09

## 2025-08-09 LAB
ALBUMIN SERPL-MCNC: 2.8 G/DL (ref 3.5–5.2)
ANION GAP SERPL CALCULATED.3IONS-SCNC: 8.9 MMOL/L (ref 5–15)
BASOPHILS # BLD AUTO: 0.01 10*3/MM3 (ref 0–0.2)
BASOPHILS NFR BLD AUTO: 0.3 % (ref 0–1.5)
BUN SERPL-MCNC: 15 MG/DL (ref 8–23)
BUN/CREAT SERPL: 6.5 (ref 7–25)
CALCIUM SPEC-SCNC: 8 MG/DL (ref 8.6–10.5)
CHLORIDE SERPL-SCNC: 99 MMOL/L (ref 98–107)
CO2 SERPL-SCNC: 25.1 MMOL/L (ref 22–29)
CREAT SERPL-MCNC: 2.32 MG/DL (ref 0.76–1.27)
DEPRECATED RDW RBC AUTO: 45.2 FL (ref 37–54)
EGFRCR SERPLBLD CKD-EPI 2021: 29.5 ML/MIN/1.73
EOSINOPHIL # BLD AUTO: 0.12 10*3/MM3 (ref 0–0.4)
EOSINOPHIL NFR BLD AUTO: 3.2 % (ref 0.3–6.2)
ERYTHROCYTE [DISTWIDTH] IN BLOOD BY AUTOMATED COUNT: 13.8 % (ref 12.3–15.4)
GLUCOSE BLDC GLUCOMTR-MCNC: 106 MG/DL (ref 65–99)
GLUCOSE BLDC GLUCOMTR-MCNC: 203 MG/DL (ref 65–99)
GLUCOSE SERPL-MCNC: 101 MG/DL (ref 65–99)
HCT VFR BLD AUTO: 28.6 % (ref 37.5–51)
HCT VFR BLD AUTO: 29.3 % (ref 37.5–51)
HCT VFR BLD AUTO: 29.6 % (ref 37.5–51)
HGB BLD-MCNC: 9 G/DL (ref 13–17.7)
HGB BLD-MCNC: 9.3 G/DL (ref 13–17.7)
HGB BLD-MCNC: 9.5 G/DL (ref 13–17.7)
IMM GRANULOCYTES # BLD AUTO: 0 10*3/MM3 (ref 0–0.05)
IMM GRANULOCYTES NFR BLD AUTO: 0 % (ref 0–0.5)
LYMPHOCYTES # BLD AUTO: 0.73 10*3/MM3 (ref 0.7–3.1)
LYMPHOCYTES NFR BLD AUTO: 19.3 % (ref 19.6–45.3)
MAGNESIUM SERPL-MCNC: 1.9 MG/DL (ref 1.6–2.4)
MCH RBC QN AUTO: 28.2 PG (ref 26.6–33)
MCHC RBC AUTO-ENTMCNC: 31.4 G/DL (ref 31.5–35.7)
MCV RBC AUTO: 89.7 FL (ref 79–97)
MONOCYTES # BLD AUTO: 0.47 10*3/MM3 (ref 0.1–0.9)
MONOCYTES NFR BLD AUTO: 12.4 % (ref 5–12)
NEUTROPHILS NFR BLD AUTO: 2.46 10*3/MM3 (ref 1.7–7)
NEUTROPHILS NFR BLD AUTO: 64.8 % (ref 42.7–76)
NRBC BLD AUTO-RTO: 0 /100 WBC (ref 0–0.2)
PHOSPHATE SERPL-MCNC: 2.7 MG/DL (ref 2.5–4.5)
PLATELET # BLD AUTO: 224 10*3/MM3 (ref 140–450)
PMV BLD AUTO: 10.1 FL (ref 6–12)
POTASSIUM SERPL-SCNC: 3.9 MMOL/L (ref 3.5–5.2)
RBC # BLD AUTO: 3.3 10*6/MM3 (ref 4.14–5.8)
SODIUM SERPL-SCNC: 133 MMOL/L (ref 136–145)
WBC NRBC COR # BLD AUTO: 3.79 10*3/MM3 (ref 3.4–10.8)

## 2025-08-09 PROCEDURE — 85018 HEMOGLOBIN: CPT | Performed by: INTERNAL MEDICINE

## 2025-08-09 PROCEDURE — 82948 REAGENT STRIP/BLOOD GLUCOSE: CPT | Performed by: INTERNAL MEDICINE

## 2025-08-09 PROCEDURE — 85014 HEMATOCRIT: CPT | Performed by: INTERNAL MEDICINE

## 2025-08-09 PROCEDURE — 83735 ASSAY OF MAGNESIUM: CPT | Performed by: INTERNAL MEDICINE

## 2025-08-09 PROCEDURE — 63710000001 INSULIN LISPRO (HUMAN) PER 5 UNITS: Performed by: INTERNAL MEDICINE

## 2025-08-09 PROCEDURE — 85025 COMPLETE CBC W/AUTO DIFF WBC: CPT | Performed by: INTERNAL MEDICINE

## 2025-08-09 PROCEDURE — 80069 RENAL FUNCTION PANEL: CPT | Performed by: INTERNAL MEDICINE

## 2025-08-09 PROCEDURE — G0378 HOSPITAL OBSERVATION PER HR: HCPCS

## 2025-08-09 PROCEDURE — 82948 REAGENT STRIP/BLOOD GLUCOSE: CPT

## 2025-08-09 RX ORDER — METOPROLOL SUCCINATE 25 MG/1
25 TABLET, EXTENDED RELEASE ORAL DAILY
Start: 2025-08-09

## 2025-08-09 RX ADMIN — INSULIN LISPRO 3 UNITS: 100 INJECTION, SOLUTION INTRAVENOUS; SUBCUTANEOUS at 14:48

## 2025-08-09 RX ADMIN — PANTOPRAZOLE SODIUM 40 MG: 40 INJECTION, POWDER, FOR SOLUTION INTRAVENOUS at 09:45

## 2025-08-09 RX ADMIN — TAMSULOSIN HYDROCHLORIDE 0.4 MG: 0.4 CAPSULE ORAL at 09:45

## 2025-08-09 RX ADMIN — FLUTICASONE PROPIONATE 2 SPRAY: 50 SPRAY, METERED NASAL at 09:45

## 2025-08-09 RX ADMIN — LEVOTHYROXINE SODIUM 75 MCG: 0.07 TABLET ORAL at 06:12

## 2025-08-11 LAB
CYTO UR: NORMAL
LAB AP CASE REPORT: NORMAL
PATH REPORT.FINAL DX SPEC: NORMAL
PATH REPORT.GROSS SPEC: NORMAL

## 2025-08-26 ENCOUNTER — HOSPITAL ENCOUNTER (EMERGENCY)
Facility: HOSPITAL | Age: 70
Discharge: NURSING, REHAB OR LONG-TERM CARE | End: 2025-08-26
Attending: EMERGENCY MEDICINE | Admitting: EMERGENCY MEDICINE
Payer: MEDICARE

## 2025-08-26 VITALS
HEART RATE: 111 BPM | RESPIRATION RATE: 17 BRPM | DIASTOLIC BLOOD PRESSURE: 109 MMHG | OXYGEN SATURATION: 95 % | TEMPERATURE: 98.3 F | SYSTOLIC BLOOD PRESSURE: 130 MMHG

## 2025-08-26 DIAGNOSIS — T82.9XXA COMPLICATION OF ARTERIOVENOUS DIALYSIS FISTULA, INITIAL ENCOUNTER: Primary | ICD-10-CM

## 2025-08-26 LAB
QT INTERVAL: 382 MS
QTC INTERVAL: 498 MS

## 2025-08-26 PROCEDURE — 93005 ELECTROCARDIOGRAM TRACING: CPT | Performed by: EMERGENCY MEDICINE

## 2025-08-26 PROCEDURE — 99283 EMERGENCY DEPT VISIT LOW MDM: CPT

## 2025-08-26 RX ORDER — BISACODYL 10 MG
10 SUPPOSITORY, RECTAL RECTAL DAILY PRN
COMMUNITY

## 2025-08-26 RX ORDER — SENNOSIDES 8.6 MG
650 CAPSULE ORAL EVERY 4 HOURS PRN
COMMUNITY

## (undated) DEVICE — TBG PENCL TELESCP MEGADYNE SMOKE EVAC 10FT

## (undated) DEVICE — ISOLATION BAG: Brand: CONVERTORS

## (undated) DEVICE — PATIENT RETURN ELECTRODE, SINGLE-USE, CONTACT QUALITY MONITORING, ADULT, WITH 9FT CORD, FOR PATIENTS WEIGING OVER 33LBS. (15KG): Brand: MEGADYNE

## (undated) DEVICE — THE TORRENT IRRIGATION SCOPE CONNECTOR IS USED WITH THE TORRENT IRRIGATION TUBING TO PROVIDE IRRIGATION FLUIDS SUCH AS STERILE WATER DURING GASTROINTESTINAL ENDOSCOPIC PROCEDURES WHEN USED IN CONJUNCTION WITH AN IRRIGATION PUMP (OR ELECTROSURGICAL UNIT).: Brand: TORRENT

## (undated) DEVICE — TRAP FLD MINIVAC MEGADYNE 100ML

## (undated) DEVICE — LN SMPL CO2 SHTRM SD STREAM W/M LUER

## (undated) DEVICE — ERBE NESSY®PLATE 170 SPLIT; 168CM²; CABLE 3M: Brand: ERBE

## (undated) DEVICE — SUT SILK 3/0 TIES 18IN A184H

## (undated) DEVICE — CANN O2 ETCO2 FITS ALL CONN CO2 SMPL A/ 7IN DISP LF

## (undated) DEVICE — SUT PROLN 5/0 RB1 D/A 36IN 8556H

## (undated) DEVICE — ANTIBACTERIAL UNDYED BRAIDED (POLYGLACTIN 910), SYNTHETIC ABSORBABLE SUTURE: Brand: COATED VICRYL

## (undated) DEVICE — MSK PROC CURAPLEX O2 2/ADAPT 7FT

## (undated) DEVICE — SNAR POLYP SENSATION STDOVL 27 240 BX40

## (undated) DEVICE — SUT PROLN 6/0 BV1 D/A 30IN 8709H

## (undated) DEVICE — SINGLE-USE POLYPECTOMY SNARE: Brand: CAPTIVATOR II

## (undated) DEVICE — Device: Brand: SINGLE USE ELECTROSURGICAL SNARE SD-400

## (undated) DEVICE — SUT SILK 3/0 SH CR5 18IN C0135

## (undated) DEVICE — TUBING, SUCTION, 1/4" X 10', STRAIGHT: Brand: MEDLINE

## (undated) DEVICE — SENSR O2 OXIMAX FNGR A/ 18IN NONSTR

## (undated) DEVICE — CANNULA,ADULT,SOFT-TOUCH,7'TUBE,UC: Brand: PENDING

## (undated) DEVICE — BITEBLOCK OMNI BLOC

## (undated) DEVICE — KT ORCA ORCAPOD DISP STRL

## (undated) DEVICE — THE SINGLE USE ETRAP – POLYP TRAP IS USED FOR SUCTION RETRIEVAL OF ENDOSCOPICALLY REMOVED POLYPS.: Brand: ETRAP

## (undated) DEVICE — SUT SILK 2/0 TIES 18IN A185H

## (undated) DEVICE — BLCK/BITE BLOX W/DENTL/RIM W/STRAP 54F

## (undated) DEVICE — GLV SURG BIOGEL LTX PF 7 1/2

## (undated) DEVICE — INJ SUB/MUCUS ELEVIEW FOR/GI/ENDO/PROC AMPL/10ML

## (undated) DEVICE — THE CARR-LOCKE INJECTION NEEDLE IS A SINGLE USE, DISPOSABLE, FLEXIBLE SHEATH INJECTION NEEDLE USED FOR THE INJECTION OF VARIOUS TYPES OF MEDIA THROUGH FLEXIBLE ENDOSCOPES.

## (undated) DEVICE — SOL NS 500ML

## (undated) DEVICE — Device: Brand: SPOT EX ENDOSCOPIC TATTOO

## (undated) DEVICE — FRCP BX RADJAW4 NDL 2.8 240CM LG OG BX40

## (undated) DEVICE — ADAPT CLN BIOGUARD AIR/H2O DISP

## (undated) DEVICE — PK AV FISTL/SHNT 40

## (undated) DEVICE — Device: Brand: DEFENDO AIR/WATER/SUCTION AND BIOPSY VALVE

## (undated) DEVICE — ADHS SKIN SURG TISS VISC PREMIERPRO EXOFIN HI/VISC FAST/DRY

## (undated) DEVICE — LASSO POLYPECTOMY SNARE: Brand: LASSO